# Patient Record
Sex: MALE | Race: WHITE | NOT HISPANIC OR LATINO | Employment: OTHER | URBAN - METROPOLITAN AREA
[De-identification: names, ages, dates, MRNs, and addresses within clinical notes are randomized per-mention and may not be internally consistent; named-entity substitution may affect disease eponyms.]

---

## 2017-01-02 ENCOUNTER — HOSPITAL ENCOUNTER (OUTPATIENT)
Dept: BEHAVIORAL HEALTH | Facility: CLINIC | Age: 62
End: 2017-01-02
Attending: SOCIAL WORKER
Payer: MEDICARE

## 2017-01-02 PROCEDURE — H2035 A/D TX PROGRAM, PER HOUR: HCPCS | Mod: HQ

## 2017-01-03 NOTE — PROGRESS NOTES
"Patient:  Jim Richard            Adult CD Progress Note and Treatment Plan Review     Attendance He attended a 2 hour session on Mon. 1/4 and Wed 1/6 for a total of 4 hours.         Total is 3 group sessions for 6 total hours.  Client also attended a 1 hour orientation session Tues 12/20 and a 1 hour tx planning session 12/21   Please refer to OP BEH CD Adult Attendance Record Documentation Flowsheet    Support group attended this week: yes    Reporting sobriety:  yes    Treatment Plan     Treatment Plan Review competed on: 1/4    Client preferred learning style: Visual  Demonstration    Staff Members contributing Earline Rubio, MS, LADC, LPC, Laly Weaver and staff                       Received Supervision: 1/4/17    Client: contributed to goals and plan.    Client received copy of plan/revised plan: Yes    Client agrees with plan/revised plan: Yes        Changes to Treatment Plan: yes    New Goals added since last review yes    Goals worked on since last review:  Use Episode Analysis, Discipline     Strategies effective: yes    Strategies need these changes: NA    ASAM Risk Rating:    Dimension 1 0. He reports his last use has changed to 12/31 and he used 12/22: Shows no signs of use or withdrawal    Dimension 2 2 Jim reports motivators are his has numerous health issues.  He is a smoker and not wanting to quit or get resources at this time. He begun working with a pain Dr. And has an appointment with his surgeon now.  Then the surgeon and pain drs. Will consult.  He takes gabapentin for neuropathy and his Drs. Think he should continue.hydroxyazine, oxycodone, Seroquel    Dimension 3 1 He reports anxiety at 5  of 10 and depression at 4 of 10 (where 1 is little and 10 =high).  At assessment scored 7 on MARIA FERNANDA-7 and 6 on PHQ9.  He reports a prior diagnosis of anxiety and major depression, recurrent, severe.  At this time he reports an increase, in anxiety and depression \"due to my using\" alchol 2x " "the last 2 weeks.  Jim reports he has no thoughts of self harm and he would never hurt himself or do that to his family.   He takes Trazodone, Effexor, and seroquel.  He has begun working with a new psychiatrist at [facility] and thinks this will be good for him, he will see him later this month     Dimension 4 3 He reports that he is \"extremely\" motivated for treatment and extremely sorry he used. \" I did not want to drive to group on Wed last week because I have Interlock\"    Dimension 5 4  He said he often gets 60 or 90 days of sobriety in and then uses.  His back pain is a trigger for him.  \"it hurts all the time, but I know using alcohol doesn't fix it\"  He states he will be talking with his surgeon and may have surgery.       Dimension 6 3 he reports he did talk to his sponsor about his using and he went to AA 3x this week.  He has pending legal issues and is using Intralock.    Any changes in Vulnerable Adult Status?  No  If yes, add to treatment plan and individual abuse prevention plan.    Family Involvement:   scheduled for next week, he intends to bring his sponsor    Data:   good insight client did participate  Client attended group and discussed his use over the last 2 weeks.  He completed \"Use Episode Analyisis and shared it with group. He feels his back pain was a big trigger, as was the disappointment of finding little relief in pain in 2 meetings with the pain clinic.  He said he did something different this time, though, he talked with sponsor and went to meetings shortly after getting sober from his drinking.  We discussed Discipline as a friend, and what he needs to consistently do to maintain long term sobriety.  Intervention:   Behavior modification  Counselor feedback  Education  Emotional management  Group feedback  Motivational Enhancement Therapy  Relapse prevention  Twelve Step facilitation   Discipline  I staffed client with clinical supervisor and staff.  Discussed his using and " "motivation for sobriety.  Discussed that he said he did something different after using this time, called his sponsor and went to AA.  He is aware that his pain needs to be dealt with and that it is a trigger, \"but it doesn't do any good to use\".  He will continue to work with pain clinic, surgeon and keep this counselor informed.  He will continue Interlock.   I talked with client about his use.  I and group gave feedback on his Use episode analysis, telling him he seemed to have a good picture of what lead to using and what he needs to add to his plan.  We discussed Discipline as coming to mean something new, which can help one become more serene and healthy, and how it fits with long term sobriety.  I told him it was good he was honest, but it would have been better had he told me on the phone when calling in sick.      Assessment:   Stages of Change Model  Contemplation    Appears/Sounds:  Cooperative  Motivated  Depressed   Seems to be happy to be sober and in a program and that he isn't getting \"kicked out\" of the program.    Plan:  Focus on recovery environment  Continue intralock  Staff client  Take medications as directed.   Work with PCP, Pain Dr. And psychiatrist on medication and pain management.  Talk with surgeon  Client will do \"balance\" exercise which will give insight into what he may want to add to his plan for ongoing sobriety.    Earline Rubio, [facility], MS, LPC        "

## 2017-01-03 NOTE — BRIEF OP NOTE
Acknowledgement of Current Treatment Plan       I have reviewed my treatment plan with my therapist / counselor on 1/4/17. I agree with the plan as it is written in the electronic health record.    Name Signature              Last Use Date:   Jim Richard    Name of Therapist / Counselor    Earline Rubio MS, LADC, LPC

## 2017-01-04 ENCOUNTER — HOSPITAL ENCOUNTER (OUTPATIENT)
Dept: BEHAVIORAL HEALTH | Facility: CLINIC | Age: 62
End: 2017-01-04
Attending: SOCIAL WORKER
Payer: MEDICARE

## 2017-01-04 PROCEDURE — H2035 A/D TX PROGRAM, PER HOUR: HCPCS | Mod: HQ

## 2017-01-09 ENCOUNTER — HOSPITAL ENCOUNTER (OUTPATIENT)
Dept: BEHAVIORAL HEALTH | Facility: CLINIC | Age: 62
End: 2017-01-09
Attending: SOCIAL WORKER
Payer: MEDICARE

## 2017-01-09 PROCEDURE — H2035 A/D TX PROGRAM, PER HOUR: HCPCS | Mod: HQ

## 2017-01-11 ENCOUNTER — HOSPITAL ENCOUNTER (OUTPATIENT)
Dept: BEHAVIORAL HEALTH | Facility: CLINIC | Age: 62
End: 2017-01-11
Attending: SOCIAL WORKER
Payer: MEDICARE

## 2017-01-11 PROCEDURE — H2035 A/D TX PROGRAM, PER HOUR: HCPCS | Mod: HQ

## 2017-01-11 NOTE — PROGRESS NOTES
Patient:  Jim Richard            Adult CD Progress Note and Treatment Plan Review     Attendance He attended a 2 hour family session on Mon. 1/9 and Wed 1/11 for a total of 4 hours.         Total is 5 group sessions for 10 total hours.  Client also attended a 1 hour orientation session Tues 12/20 and a 1 hour tx planning session 12/21   Please refer to OP BEH CD Adult Attendance Record Documentation Flowsheet    Support group attended this week: yes;WEEK of 1/9 family group- yes    Reporting sobriety:  Yes;WEEK of 1/9 family group-yes TONI reported 1/1/17    Treatment Plan     Treatment Plan Review competed on: 1/11    Client preferred learning style: Visual  Demonstration    Staff Members contributing Earline Rubio, MS, LADC, STEPHANIE, Laly Weaver and staff; Nelida MCKEON,PONCHO,SYLVIE                       Received Supervision: 1/4/17    Client: contributed to goals and plan.    Client received copy of plan/revised plan: Yes    Client agrees with plan/revised plan: Yes        Changes to Treatment Plan: yes    New Goals added since last review yes    Goals worked on since last review:  Use Episode Analysis, Discipline ;Attended family group 1/9 and 1/11/17    Strategies effective: yes    Strategies need these changes: NA    ASAM Risk Rating:    Dimension 1 0. He reports his last use has changed to 12/31 and he used 12/22: Shows no signs of use or withdrawal; WEEk of 1/9- TONI reported 1/1/17- no WD issues reported or observed.    Dimension 2 2 Jim reports motivators are his has numerous health issues.  He is a smoker and not wanting to quit or get resources at this time. He begun working with a pain DrJacek And has an appointment with his surgeon now.  Then the surgeon and pain drs. Will consult.  He takes gabapentin for neuropathy and his Drs. Think he should continue.hydroxyazine, oxycodone, Seroquel; WEEK of 1/9 family group-back pain reported, client reports Dr stated their is nothing they can do unless he  "stops smoking (client in contemplation)    Dimension 3 1 He reports anxiety at 5  of 10 and depression at 4 of 10 (where 1 is little and 10 =high).  At assessment scored 7 on MARIA FERNANDA-7 and 6 on PHQ9.  He reports a prior diagnosis of anxiety and major depression, recurrent, severe.  At this time he reports an increase, in anxiety and depression \"due to my using\" alchol 2x the last 2 weeks.  Jim reports he has no thoughts of self harm and he would never hurt himself or do that to his family.   He takes Trazodone, Effexor, and seroquel.  He has begun working with a new psychiatrist at [facility] and thinks this will be good for him, he will see him later this month WEEK of 1/9 family group -No emotional/behavioral concerns reported-(reports emotional/behavioral issues \"OK\") /no SI reported.    Dimension 4 3 He reports that he is \"extremely\" motivated for treatment and extremely sorry he used. \" I did not want to drive to group on Wed last week because I have Interlock\"WEEK of 1/9- reports motivation for recovery a 10 on 1-10 scale with 10 highest; reports cravings a 6 on 1-10 scale with 10 highest due to pain.    Dimension 5 4  He said he often gets 60 or 90 days of sobriety in and then uses.  His back pain is a trigger for him.  \"it hurts all the time, but I know using alcohol doesn't fix it\"  He states he will be talking with his surgeon and may have surgery.  WEEK of 1/9 family group-pain is a trigger (reports cravings a 6 on 1-10 scale with 10 highest due to pain). Stated he participated in sober activities this week and spent time with his son.     Dimension 6 3 he reports he did talk to his sponsor about his using and he went to AA 3x this week.  He has pending legal issues and is using Intralock.WEEK of 1/9 family group-reports high motivation for recovery; has sponsor who is supportive    Any changes in Vulnerable Adult Status?  No  If yes, add to treatment plan and individual abuse prevention plan.    Family " "Involvement:   scheduled for next week, he intends to bring his sponsor  WEEK of 1/9/17- attended both of the scheduled family sessions on 1/9 and 1/11/17  Data:   good insight client did participate  Client attended group and discussed his use over the last 2 weeks.  He completed \"Use Episode Analyisis and shared it with group. He feels his back pain was a big trigger, as was the disappointment of finding little relief in pain in 2 meetings with the pain clinic.  He said he did something different this time, though, he talked with sponsor and went to meetings shortly after getting sober from his drinking.  We discussed Discipline as a friend, and what he needs to consistently do to maintain long term sobriety.  WEEK of 1/9/17 family group-Client attended both of the scheduled family sessions- session one he brought his sponsor, session two he came alone. Client introduced himself to the family group along with his sponsor, and he gave a synopsis of why he is now in treatment. He stated that he has had many relapses and it has been difficult for him to stay sober. Client discussed how his ex wife told him she couldn't take any more of his etoh use- so he went to treatment and then had a couple of lapses and she served him with papers. He stated that this used to really bother him but he has begun to get over it a little more. His sponsor discussed how the client keeps coming and back and trying- not giving up. And the group and counselor gave positive feedback to the client for his determination to gain a stable sobriety. Client also briefly presented his day two worksheet on relapse, self-care and relationship issues. Client stated he needs to look at his sugar intake, and is contemplating quitting smoking.  Intervention:   Behavior modification  Counselor feedback  Education  Emotional management  Group feedback  Motivational Enhancement Therapy  Relapse prevention  Twelve Step facilitation   Discipline  I staffed " "client with clinical supervisor and staff.  Discussed his using and motivation for sobriety.  Discussed that he said he did something different after using this time, called his sponsor and went to AA.  He is aware that his pain needs to be dealt with and that it is a trigger, \"but it doesn't do any good to use\".  He will continue to work with pain clinic, surgeon and keep this counselor informed.  He will continue Interlock.   I talked with client about his use.  I and group gave feedback on his Use episode analysis, telling him he seemed to have a good picture of what lead to using and what he needs to add to his plan.  We discussed Discipline as coming to mean something new, which can help one become more serene and healthy, and how it fits with long term sobriety.  I told him it was good he was honest, but it would have been better had he told me on the phone when calling in sick.    WEEK of 1/9/17-family group-MET, education, counselor and group feedback.  Assessment:   Stages of Change Model  Contemplation    Appears/Sounds:  Cooperative  Motivated  Depressed   Seems to be happy to be sober and in a program and that he isn't getting \"kicked out\" of the program.  WEEK of 1/9/17- client appears sad/depressed (no SI reported);cooperative and engaged as evidenced by his participation/  Plan:  Focus on recovery environment  Continue intralock  Staff client  Take medications as directed.   Work with PCP, Pain Dr. And psychiatrist on medication and pain management.  Talk with surgeon  Client will do \"balance\" exercise which will give insight into what he may want to add to his plan for ongoing sobriety.  WEEK of 1/9/17family group- client to continue in treatment, use his support system, sponsor, consider stopping smoking and reducing his sugar intake/manage his medications/work with his physician regarding his pain if he stops smoking./possible grief issues  SYLVIE Broderick, MA,PONCHO        "

## 2017-01-16 ENCOUNTER — HOSPITAL ENCOUNTER (OUTPATIENT)
Dept: BEHAVIORAL HEALTH | Facility: CLINIC | Age: 62
End: 2017-01-16
Attending: SOCIAL WORKER
Payer: MEDICARE

## 2017-01-16 PROCEDURE — H2035 A/D TX PROGRAM, PER HOUR: HCPCS | Mod: HQ

## 2017-01-16 NOTE — PROGRESS NOTES
Patient:  Jim Richard            Adult CD Progress Note and Treatment Plan Review     Attendance He attended a 2 hour family session on Mon. 1/16  for a total of 2 hours.         Total is 19 group sessions for 38 total hours.  Client also attended a 1 hour orientation session Tues 12/20 and a 1 hour tx planning session 12/21   Please refer to OP BEH CD Adult Attendance Record Documentation Flowsheet    Support group attended this week: yes;WEEK of 1/9 family group- yes    Reporting sobriety:  Yes;    Treatment Plan     Treatment Plan Review competed on: 1/18    Client preferred learning style: Visual  Demonstration    Staff Members contributing Earline Rubio, MS, LADC, Nelida BROWN MA,PONCHO,LADC                       Received Supervision: 1/4/17    Client: contributed to goals and plan.    Client received copy of plan/revised plan: Yes    Client agrees with plan/revised plan: Yes        Changes to Treatment Plan: no    New Goals added since last review no    Goals worked on since last review: AA, chronic pain    Strategies effective: yes    Strategies need these changes: NA    ASAM Risk Rating:    Dimension 1 0. He reports his last use as 12/31. Shows no signs of use or withdrawal    Dimension 2 2 Jim moody reports Dr stated their is nothing they can do unless he stops smoking, which he isn't ready to do yet.  He begun working with a pain Dr. And has an appointment with his surgeon now.  Then the surgeon and pain drs. Will consult.  He takes gabapentin for neuropathy and his Drs. Think he should continue.hydroxyazine, oxycodone, Seroquel; , c  Dimension 3 1 He reports anxiety at 5  of 10 and depression at 5 of 10 (where 1 is little and 10 =high).    He reports a prior diagnosis of anxiety and major depression, recurrent, severe..  Jim reports he has no thoughts of self harm and he would never hurt himself or do that to his family.   He takes Trazodone (2-100mg before bed, Effexor (1-150 mg 1x  "daily), and seroquel (4-50 mg tablets daily).  He has begun working with a new psychiatrist at [facility] and thinks this will be good for him, he will see him later this week.    Dimension 4 2 . He reports that he is \"extremely\" motivated for treatment , 10 of 10  with 10 highest;     Dimension 5 3  He said he is going to continue to use prayer, sponsor and AA for support when he feels like using because of his pain.  We also discussed meditation and conscious breath to the area of pain.     Dimension 6 2 he reports he did talk to his brother and to his sponsor when he had cravings this week, due to his pain.  He has pending legal issues and is using Intralock.    Any changes in Vulnerable Adult Status?  No  If yes, add to treatment plan and individual abuse prevention plan.    Family Involvement:   none schedule this week  WEEK of 1/9/17- attended both of the scheduled family sessions on 1/9 and 1/11/17  Data:   good insight client did participate  Client attended group and discussed his pain issues and chronic pain assignment. He told us that the  Said he cannot do anything about Jim's pain unless he quits smoking, which Jim is in contemplation about. He did discuss he is going to continue to use prayer, sponsor and AA for support when he feels like using because of his pain.  We also discussed meditation and conscious breath to the area of pain.   Intervention:   Behavior modification  Counselor feedback  Education  Emotional management  Group feedback  Motivational Enhancement Therapy  Relapse prevention  Twelve Step facilitation   Discipline  This counselor checked in with client and we discussed why the surgeon may not be able to help him if he doesn't quit smoking.  We discussed his chronic pain.  Had an AA member come and speak to the group.  Had Jim share how he felt about the presentation. Handed out AA resources.  i told him he is doing a good job of attending AA and spending time with his " sponsor  Assessment:   Stages of Change Model  Contemplation    Appears/Sounds:  Cooperative  Motivated  Depressed   Seems to be depressed about his pain, yet hopeful there is something good to come.  He seems to be sure he wants sobriety in his life  Plan:  Focus on recovery environment  Continue intralock  Discuss possible grief issues.  Finish chronic pain assignment   SYLVIE Sarmiento, MA,LISW

## 2017-01-23 ENCOUNTER — HOSPITAL ENCOUNTER (OUTPATIENT)
Dept: BEHAVIORAL HEALTH | Facility: CLINIC | Age: 62
End: 2017-01-23
Attending: SOCIAL WORKER
Payer: MEDICARE

## 2017-01-23 PROCEDURE — H2035 A/D TX PROGRAM, PER HOUR: HCPCS | Mod: HQ

## 2017-01-23 NOTE — PROGRESS NOTES
Patient:  Jim Richard            Adult CD Progress Note and Treatment Plan Review     Attendance He attended a 2  session on Mon. 1/23  for a total of 4 hours.         Total is 9 group sessions for 18 total hours.  Client also attended a 1 hour orientation session Tues 12/20 and a 1 hour tx planning session 12/21   Please refer to OP BEH CD Adult Attendance Record Documentation Flowsheet    Support group attended this week: yes;    Reporting sobriety:  Yes;    Treatment Plan     Treatment Plan Review competed on: 1/25    Client preferred learning style: Visual  Demonstration    Staff Members contributing Earline Rubio, MS, LADC, LPC,                       Received Supervision: 1/4/17    Client: contributed to goals and plan.    Client received copy of plan/revised plan: Yes    Client agrees with plan/revised plan: Yes        Changes to Treatment Plan: yes    New Goals added since last review yes    Goals worked on since last review: chronic pain, relapse prevention, therapy    Strategies effective: yes    Strategies need these changes: NA    ASAM Risk Rating:    Dimension 1 0. He reports his last use as 1/25. He said he drank for a day before this and has had some shakes but they are done and he doesn't intend to use, because he needs a lodging program. Shows no signs of use or withdrawal    Dimension 2 2 Jim justinent reports Dr stated their is nothing they can do unless he stops smoking, which he isn't ready to do yet.  He begun working with a pain Dr. And has an appointment with his surgeon now.  Then the surgeon and pain drs. Will consult.  He takes gabapentin for neuropathy and his Drs. Think he should continue.hydroxyazine, oxycodone, Seroquel;    Dimension 3 1 He reports anxiety at 7  of 10 and depression at 7 of 10 and stress at 7 o f 10(where 1 is little and 10 =high).   He says his high scores have to do with pain and using episode, see below.  He reports a prior diagnosis of anxiety and major  "depression, recurrent, severe. Jim reports he \"I no thoughts of self harm and I would never hurt myself\", he also repeated that he would not do that to his family.   He takes Trazodone (2-100mg before bed, Effexor (1-150 mg 1x daily), and seroquel (4-50 mg tablets daily).  He has begun working with a new psychiatrist, see bleow    Dimension 4 2 . He reports that he is \"extremely\" motivated for treatment and sobriety, 10 of 10  with 10 highest;     Dimension 5 3  He said he is going to continue to use prayer, sponsor and AA for support when he feels like using because of his pain.  We also discussed meditation and conscious breath to the area of pain.  He will meet with his pain drs. 1/24    Dimension 6 2 he reports he did talk to his sponsor numberous times this last week and attended meetings  He has pending legal issues and is using [employer].    Any changes in Vulnerable Adult Status?  No  If yes, add to treatment plan and individual abuse prevention plan.    Family Involvement:   none schedule this week  WEEK of 1/9/17- attended both of the scheduled family sessions on 1/9 and 1/11/17  Data:   good insight client did participate  Client attended group and Monday and reported using chemicals and it was due to his surgeon saying he needed to quit smoking before he could do anything and Jim said his pain is becoming unbearable.  He completed his chronic pain assignment and said it was fitting, as it is much of his reason for using.  He said he feels\" remose, regret sad, shame and many other hard feelings\".  He said, despite all of this, he is hanging on to hope.  His psychiatrist said she cannot adjust his medications, nor do they work properly when he is using.  This counselor supported this and Jim said he knows this.  discussed his pain issues and chronic pain and his biggest trigger and he said he just needs to learn to get through the moments because \"using does nothing\".  He also reports he wants to get " "back to being a productive member of society.  He is starting an exercise program and will look into a therapist, as this counselor suggested.  I also suggested he talk to his psychiatrist about campral and naltrexone, as I will do when I talk to her. He did say he is starting a 6 week Big book discussion class.  Jim called 1/25 and said he would like to consider ideas for Lodging program and he wanted to go to [facility]. He then told me he had different insurance than what I was aware of. I told him if he needed detox, he could go to a [facility] facility for Detox  I told him about [facility].   Jim said he had a friend coming over, his sponsor and he would make some calls.  I also told him about the [facility].  He said he was having some depression, but \"not as in I will hurt myself\" He said he is upset he is using again, but ready to get further help.  Intervention:   Behavior modification  Counselor feedback  Education  Emotional management  Group feedback  Motivational Enhancement Therapy  Relapse prevention  Twelve Step facilitation   Discipline  This counselor checked in on Monday, with client and suggested that we are walking a balance of suggesting a higher level of care, and trying to help Jim learn to live soberly when he is not in a lodging program.  I told him I support continued OP programming if he gets a therapist, checks with his psychiatrist about campral or naltrexone for cravings, and reports what his pain clinic suggests he do, if surgery is not an option.  I also talked to his psychiatrist, Bre Pritchett, at [facility] and she is willing to prescribe Naltrexone.  As of Wednesday, Jim did none of these, but did call to discuss going to inpatient tx.  Assessment:   Stages of Change Model  Contemplation    Appears/Sounds:  Cooperative  Motivated  Depressed  Anxious   Seems to be depressed about his pain, yet hopeful there is something good to come.  He seems to be sure he wants sobriety in " his life and feels very disappointed in himself for using.  I don't know if Jim will be able to be sober, but I believe he needs to learn to live with the resources and realities, like chronic pain, that he has.  I do not see that  Lodging program is necessary at this time, however, I do believe it may be in the future, if these other resources do not add to his sober lifestyle.  He seems humbled and motivated by his use episode.  Plan:  Focus on recovery environment  Continue intralock  Look into a therapist, report who you will see.  Talk with Dr. senior about naltrexone or antabuse. Discuss possible grief issues with a therapist.  He is going to call Lodging programs and see what his options are.  This counselor called Merissa in the business office to tell her I just found out Jim has had [employer] since Jan 1, but didn't let me know.    Earline Rubio, SYLVIE, MS, LPC

## 2017-01-23 NOTE — BRIEF OP NOTE
Acknowledgement of Current Treatment Plan       I have reviewed my treatment plan with my therapist / counselor on 1/23. I agree with the plan as it is written in the electronic health record.    Name Signature             Last Use Date: 1/18/17   Jim Richard    Name of Therapist / Counselor    Earline Rubio MS, LADC, LPC

## 2017-02-07 ENCOUNTER — HOSPITAL ENCOUNTER (INPATIENT)
Facility: CLINIC | Age: 62
LOS: 2 days | Discharge: HOME | DRG: 885 | End: 2017-02-10
Attending: EMERGENCY MEDICINE | Admitting: PSYCHIATRY & NEUROLOGY
Payer: MEDICARE

## 2017-02-07 ENCOUNTER — TELEPHONE (OUTPATIENT)
Dept: BEHAVIORAL HEALTH | Facility: CLINIC | Age: 62
End: 2017-02-07

## 2017-02-07 DIAGNOSIS — F10.20 ACUTE ALCOHOLISM (H): ICD-10-CM

## 2017-02-07 DIAGNOSIS — F33.2 SEVERE RECURRENT MAJOR DEPRESSION WITHOUT PSYCHOTIC FEATURES (H): Primary | ICD-10-CM

## 2017-02-07 DIAGNOSIS — F10.220 ACUTE ALCOHOLIC INTOXICATION IN ALCOHOLISM, UNCOMPLICATED (H): ICD-10-CM

## 2017-02-07 DIAGNOSIS — R45.851 SUICIDAL IDEATION: ICD-10-CM

## 2017-02-07 LAB
AMPHETAMINES UR QL SCN: NORMAL
ANION GAP SERPL CALCULATED.3IONS-SCNC: 11 MMOL/L (ref 3–14)
APAP SERPL-MCNC: NORMAL MG/L (ref 10–20)
BARBITURATES UR QL: NORMAL
BASOPHILS # BLD AUTO: 0 10E9/L (ref 0–0.2)
BASOPHILS NFR BLD AUTO: 0.5 %
BENZODIAZ UR QL: NORMAL
BUN SERPL-MCNC: 22 MG/DL (ref 7–30)
CALCIUM SERPL-MCNC: 8 MG/DL (ref 8.5–10.1)
CANNABINOIDS UR QL SCN: NORMAL
CHLORIDE SERPL-SCNC: 101 MMOL/L (ref 94–109)
CO2 SERPL-SCNC: 23 MMOL/L (ref 20–32)
COCAINE UR QL: NORMAL
CREAT SERPL-MCNC: 1.28 MG/DL (ref 0.66–1.25)
DIFFERENTIAL METHOD BLD: ABNORMAL
EOSINOPHIL # BLD AUTO: 0 10E9/L (ref 0–0.7)
EOSINOPHIL NFR BLD AUTO: 0.7 %
ERYTHROCYTE [DISTWIDTH] IN BLOOD BY AUTOMATED COUNT: 15.5 % (ref 10–15)
ETHANOL SERPL-MCNC: 0.26 G/DL
GFR SERPL CREATININE-BSD FRML MDRD: 57 ML/MIN/1.7M2
GLUCOSE SERPL-MCNC: 120 MG/DL (ref 70–99)
HCT VFR BLD AUTO: 36.4 % (ref 40–53)
HGB BLD-MCNC: 12.2 G/DL (ref 13.3–17.7)
IMM GRANULOCYTES # BLD: 0 10E9/L (ref 0–0.4)
IMM GRANULOCYTES NFR BLD: 0.4 %
LYMPHOCYTES # BLD AUTO: 1.3 10E9/L (ref 0.8–5.3)
LYMPHOCYTES NFR BLD AUTO: 23.5 %
MCH RBC QN AUTO: 29.6 PG (ref 26.5–33)
MCHC RBC AUTO-ENTMCNC: 33.5 G/DL (ref 31.5–36.5)
MCV RBC AUTO: 88 FL (ref 78–100)
MONOCYTES # BLD AUTO: 0.4 10E9/L (ref 0–1.3)
MONOCYTES NFR BLD AUTO: 7.5 %
NEUTROPHILS # BLD AUTO: 3.8 10E9/L (ref 1.6–8.3)
NEUTROPHILS NFR BLD AUTO: 67.4 %
NRBC # BLD AUTO: 0 10*3/UL
NRBC BLD AUTO-RTO: 0 /100
OPIATES UR QL SCN: NORMAL
PCP UR QL SCN: NORMAL
PLATELET # BLD AUTO: 154 10E9/L (ref 150–450)
POTASSIUM SERPL-SCNC: 3.5 MMOL/L (ref 3.4–5.3)
RBC # BLD AUTO: 4.12 10E12/L (ref 4.4–5.9)
SALICYLATES SERPL-MCNC: 3 MG/DL
SODIUM SERPL-SCNC: 135 MMOL/L (ref 133–144)
WBC # BLD AUTO: 5.6 10E9/L (ref 4–11)

## 2017-02-07 PROCEDURE — 80320 DRUG SCREEN QUANTALCOHOLS: CPT | Performed by: EMERGENCY MEDICINE

## 2017-02-07 PROCEDURE — 80048 BASIC METABOLIC PNL TOTAL CA: CPT | Performed by: EMERGENCY MEDICINE

## 2017-02-07 PROCEDURE — 80307 DRUG TEST PRSMV CHEM ANLYZR: CPT | Performed by: EMERGENCY MEDICINE

## 2017-02-07 PROCEDURE — 80329 ANALGESICS NON-OPIOID 1 OR 2: CPT | Performed by: EMERGENCY MEDICINE

## 2017-02-07 PROCEDURE — 90791 PSYCH DIAGNOSTIC EVALUATION: CPT

## 2017-02-07 PROCEDURE — 85025 COMPLETE CBC W/AUTO DIFF WBC: CPT | Performed by: EMERGENCY MEDICINE

## 2017-02-07 PROCEDURE — 93005 ELECTROCARDIOGRAM TRACING: CPT

## 2017-02-07 PROCEDURE — 99285 EMERGENCY DEPT VISIT HI MDM: CPT | Mod: 25

## 2017-02-07 RX ORDER — ACAMPROSATE CALCIUM 333 MG/1
666 TABLET, DELAYED RELEASE ORAL 3 TIMES DAILY
COMMUNITY
End: 2017-12-20

## 2017-02-07 ASSESSMENT — ENCOUNTER SYMPTOMS
VOMITING: 0
NAUSEA: 0
COUGH: 1
NERVOUS/ANXIOUS: 1

## 2017-02-07 NOTE — ED AVS SNAPSHOT
Emergency Department    [address] [facility] [address]    Phone:  [phone]    Fax:  [phone]                                       Jim Richard   MRN: [id number]    Department:   Emergency Department   Date of Visit:  2/7/2017           After Visit Summary Signature Page     I have received my discharge instructions, and my questions have been answered. I have discussed any challenges I see with this plan with the nurse or doctor.    ..........................................................................................................................................  Patient/Patient Representative Signature      ..........................................................................................................................................  Patient Representative Print Name and Relationship to Patient    ..................................................               ................................................  Date                                            Time    ..........................................................................................................................................  Reviewed by Signature/Title    ...................................................              ..............................................  Date                                                            Time

## 2017-02-07 NOTE — IP AVS SNAPSHOT
MRN:[id number]                      After Visit Summary   2/7/2017    Jim Richard    MRN: [id number]           Thank you!     Thank you for choosing [facility] for your care. Our goal is always to provide you with excellent care.        Patient Information     Date Of Birth          1955        About your hospital stay     You were admitted on:  February 8, 2017 You last received care in the:  [facility]    You were discharged on:  February 10, 2017       Who to Call     For medical emergencies, please call 911.  For non-urgent questions about your medical care, please call your primary care provider or clinic, [phone]          Attending Provider     Provider    Kurt Villa MD Isely, MD Campos Wall, MD Rodriguez Gaitan, MD Giorgi Valles, Brody ANNE MD       Primary Care Provider Office Phone # Fax #    Vilma Romo [facility] [phone]       [address] [address]        Further instructions from your care team         Behavioral Discharge Planning and Instructions    Summary:  Admitted with intentional drug overdose with suicidal ideation    Main Diagnosis:  Major Depression, recurrent, severe, without psychotic features; Alcohol Dependence    Major Treatments, Procedures and Findings: Psychiatric assessment    Symptoms to Report: Losing more sleep, Mood getting worse or Thoughts of suicide    Lifestyle Adjustment: Follow all treatment recommendations, including completing chemical dependency treatment. Develop and follow safety plan. Maintain sober lifestyle (AA meetings and sponsor).     Psychiatry Follow-up:     You are being referred to [facility] for chemical dependency treatment. Please contact them next week to see when a bed will be available for you. Once you are in the treatment program, you will follow up weekly with Dr. Rand at  "[facility].     [facility]  [address] [place] [address]  [phone] / [phone] fax    Resources:   Crisis Intervention: 296.285.5336 or 779-663-5829 (TTY: 517.275.1152).  Call anytime for help.  National [place] on Mental Illness (www.mn.debra.org): 505.491.6608 or 399-851-7066.  Alcoholics Anonymous (www.alcoholics-anonymous.org): Check your phone book for your local chapter.  National Suicide Prevention Line (www.mentalFreight Connectionmn.org): 315-076-ULCP (7416)    General Medication Instructions:   See your medication sheet(s) for instructions.   Take all medicines as directed.  Make no changes unless your doctor suggests them.   Go to all your doctor visits.  Be sure to have all your required lab tests. This way, your medicines can be refilled on time.  Do not use any drugs not prescribed by your doctor.  Avoid alcohol.        Pending Results     No orders found from 2/6/2017 to 2/8/2017.            Statement of Approval     Ordered          02/10/17 1145  I have reviewed and agree with all the recommendations and orders detailed in this document.   EFFECTIVE NOW     Approved and electronically signed by:  Brody Rand MD             Admission Information        Provider Department Dept Phone    2/7/2017 Brody Rand MD [facility] [phone]      Your Vitals Were     Blood Pressure Pulse Temperature    159/87 mmHg 64 98.1  F (36.7  C) (Oral)    Respirations Height Weight    16 1.702 m (5' 7\") 98.022 kg (216 lb 1.6 oz)    BMI (Body Mass Index) Pulse Oximetry       33.84 kg/m2 95%       MyChart Information     [employer] lets you send messages to your doctor, view your test results, renew your prescriptions, schedule appointments and more. To sign up, go to [email or web address] . Click on \"Log in\" on the left side of the screen, which will take you to the Welcome page. Then click on \"Sign up Now\" on the right side of the page. "     You will be asked to enter the access code listed below, as well as some personal information. Please follow the directions to create your username and password.     Your access code is: [email or web address]  Expires: 2017  9:54 AM     Your access code will  in 90 days. If you need help or a new code, please call your [facility] clinic or [phone].        Care EveryWhere ID     This is your Care EveryWhere ID. This could be used by other organizations to access your [facility] medical records  [id number]           Review of your medicines      START taking        Dose / Directions    disulfiram 250 MG tablet   Commonly known as:  ANTABUSE   Used for:  Acute alcoholism (H)        Dose:  250 mg   Take 1 tablet (250 mg) by mouth daily   Quantity:  30 tablet   Refills:  0       DULoxetine 60 MG EC capsule   Commonly known as:  CYMBALTA   Used for:  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)        Dose:  60 mg   Start taking on:  2017   Take 1 capsule (60 mg) by mouth daily   Quantity:  30 capsule   Refills:  0       mirtazapine 15 MG tablet   Commonly known as:  REMERON   Used for:  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)        Dose:  15 mg   Take 1 tablet (15 mg) by mouth At Bedtime   Quantity:  30 tablet   Refills:  0         CONTINUE these medicines which have NOT CHANGED        Dose / Directions    acamprosate 333 MG EC tablet   Commonly known as:  CAMPRAL        Dose:  666 mg   Take 666 mg by mouth 3 times daily   Refills:  0       ATORVASTATIN CALCIUM PO        Dose:  10 mg   Take 10 mg by mouth At Bedtime   Refills:  0       GABAPENTIN PO        Dose:  600 mg   Take 600 mg by mouth 3 times daily   Refills:  0       HYDROXYZINE HCL PO        Dose:  50 mg   Take 50 mg by mouth 3 times daily as needed for other (anxiety)   Refills:  0       ibuprofen 400 MG tablet   Commonly known as:  ADVIL/MOTRIN   Used for:  Pain of left thigh        Dose:  400 mg    Take 1 tablet (400 mg) by mouth every 8 hours as needed for moderate pain   Quantity:  120 tablet   Refills:  1       MELOXICAM PO        Dose:  7.5 mg   Take 7.5 mg by mouth 2 times daily as needed   Refills:  0       multivitamin, therapeutic with minerals Tabs tablet        Dose:  1 tablet   Take 1 tablet by mouth daily   Refills:  0       OMEGA-3 FISH OIL PO        Dose:  1 g   Take 1 g by mouth daily   Refills:  0       QUETIAPINE FUMARATE PO        Dose:  50 mg   Take 50 mg by mouth 3 times daily as needed   Refills:  0       spironolactone-hydrochlorothiazide 25-25 MG per tablet   Commonly known as:  ALDACTAZIDE        Dose:  1 tablet   Take 1 tablet by mouth daily   Refills:  0       TRAZODONE HCL PO        Dose:  200 mg   Take 200 mg by mouth At Bedtime   Refills:  0       venlafaxine 150 MG 24 hr capsule   Commonly known as:  EFFEXOR-XR        Dose:  150 mg   Take 150 mg by mouth daily   Refills:  0            Where to get your medicines      These medications were sent to [facility] - [address]  [address] [facility] [address], [place] [address]     Phone:  [phone]    - disulfiram 250 MG tablet  - DULoxetine 60 MG EC capsule  - mirtazapine 15 MG tablet             Protect others around you: Learn how to safely use, store and throw away your medicines at www.disposemymeds.org.             Medication List: This is a list of all your medications and when to take them. Check marks below indicate your daily home schedule. Keep this list as a reference.      Medications           Morning Afternoon Evening Bedtime As Needed    acamprosate 333 MG EC tablet   Commonly known as:  CAMPRAL   Take 666 mg by mouth 3 times daily   Last time this was given:  666 mg on 2/10/2017  8:03 AM                                         ATORVASTATIN CALCIUM PO   Take 10 mg by mouth At Bedtime   Last time this was given:  10 mg on 2/9/2017 10:02 PM                                   disulfiram 250  MG tablet   Commonly known as:  ANTABUSE   Take 1 tablet (250 mg) by mouth daily   Last time this was given:  250 mg on 2/10/2017 11:27 AM                                   DULoxetine 60 MG EC capsule   Commonly known as:  CYMBALTA   Take 1 capsule (60 mg) by mouth daily   Start taking on:  2/11/2017   Last time this was given:  30 mg on 2/10/2017  8:03 AM                                   GABAPENTIN PO   Take 600 mg by mouth 3 times daily   Last time this was given:  600 mg on 2/10/2017  8:03 AM                                         HYDROXYZINE HCL PO   Take 50 mg by mouth 3 times daily as needed for other (anxiety)   Last time this was given:  50 mg on 2/9/2017  2:14 AM                                   ibuprofen 400 MG tablet   Commonly known as:  ADVIL/MOTRIN   Take 1 tablet (400 mg) by mouth every 8 hours as needed for moderate pain                                   MELOXICAM PO   Take 7.5 mg by mouth 2 times daily as needed                                mirtazapine 15 MG tablet   Commonly known as:  REMERON   Take 1 tablet (15 mg) by mouth At Bedtime   Last time this was given:  7.5 mg on 2/9/2017 10:02 PM                                   multivitamin, therapeutic with minerals Tabs tablet   Take 1 tablet by mouth daily   Last time this was given:  1 tablet on 2/10/2017  8:03 AM                                   OMEGA-3 FISH OIL PO   Take 1 g by mouth daily   Last time this was given:  1 g on 2/10/2017  8:03 AM                                   QUETIAPINE FUMARATE PO   Take 50 mg by mouth 3 times daily as needed   Last time this was given:  50 mg on 2/9/2017  2:44 PM                                   spironolactone-hydrochlorothiazide 25-25 MG per tablet   Commonly known as:  ALDACTAZIDE   Take 1 tablet by mouth daily                                   TRAZODONE HCL PO   Take 200 mg by mouth At Bedtime   Last time this was given:  200 mg on 2/9/2017 10:02 PM                                   venlafaxine 150  MG 24 hr capsule   Commonly known as:  EFFEXOR-XR   Take 150 mg by mouth daily   Last time this was given:  150 mg on 2/10/2017  8:03 AM                                             More Information        Depression: Tips to Help Yourself  As your health care providers help treat your depression, you can also help yourself. Keep in mind that your illness affects you emotionally, physically, mentally, and socially. So full recovery will take time. Take care of your body and your soul, and be patient with yourself as you get better.    Be with others  Don t isolate yourself--you ll only feel worse. Try to be with other people. And take part in fun activities when you can. Go to a movie, [employer], Yazdanism service, or social event. Talk openly with people you can trust. And accept help when it s offered.  Keep your perspective    Depression can cloud your judgment. So wait until you feel better before making major life decisions, such as changing jobs, moving, or getting  or .    This illness is not your fault. Don t blame yourself for your depression.    Recovering from depression is a process. Don t be discouraged if it takes some time to feel better.    Depression saps your energy and concentration. So you won t be able to do all the things you used to do. Set small goals and do what you can.  Take care of your body  People with depression often lose the desire to take care of themselves. That only makes their problems worse. During treatment and afterward, make a point to:    Exercise. It s a great way to take care of your body. And studies have shown that exercise helps fight depression.    Avoid drugs and alcohol. These may ease the pain in the short term. But they ll only make your problems worse in the long run.    Get relief from stress. Ask your healthcare provider for relaxation exercises and techniques to help relieve stress.    Eat right. A balanced and healthy diet helps keep your body  healthy.    3728-6975 [employer]. [address] [place] [address], [place] [address]. All rights reserved. This information is not intended as a substitute for professional medical care. Always follow your healthcare professional's instructions.

## 2017-02-07 NOTE — IP AVS SNAPSHOT
[place] [address] [place] [address]    Phone:  [phone]                                       After Visit Summary   2/7/2017    Jim Richard    MRN: [id number]           After Visit Summary Signature Page     I have received my discharge instructions, and my questions have been answered. I have discussed any challenges I see with this plan with the nurse or doctor.    ..........................................................................................................................................  Patient/Patient Representative Signature      ..........................................................................................................................................  Patient Representative Print Name and Relationship to Patient    ..................................................               ................................................  Date                                            Time    ..........................................................................................................................................  Reviewed by Signature/Title    ...................................................              ..............................................  Date                                                            Time

## 2017-02-08 PROBLEM — F32.A DEPRESSION: Status: ACTIVE | Noted: 2017-02-08

## 2017-02-08 LAB
ALBUMIN SERPL-MCNC: 3.6 G/DL (ref 3.4–5)
ALCOHOL BREATH TEST: 0.02 (ref 0–0.01)
ALP SERPL-CCNC: 152 U/L (ref 40–150)
ALT SERPL W P-5'-P-CCNC: 30 U/L (ref 0–70)
ANION GAP SERPL CALCULATED.3IONS-SCNC: 11 MMOL/L (ref 3–14)
AST SERPL W P-5'-P-CCNC: 31 U/L (ref 0–45)
BILIRUB SERPL-MCNC: 0.5 MG/DL (ref 0.2–1.3)
BUN SERPL-MCNC: 24 MG/DL (ref 7–30)
CALCIUM SERPL-MCNC: 8.8 MG/DL (ref 8.5–10.1)
CHLORIDE SERPL-SCNC: 98 MMOL/L (ref 94–109)
CO2 SERPL-SCNC: 24 MMOL/L (ref 20–32)
CREAT SERPL-MCNC: 1.08 MG/DL (ref 0.66–1.25)
GFR SERPL CREATININE-BSD FRML MDRD: 69 ML/MIN/1.7M2
GLUCOSE SERPL-MCNC: 86 MG/DL (ref 70–99)
INTERPRETATION ECG - MUSE: NORMAL
POTASSIUM SERPL-SCNC: 4.1 MMOL/L (ref 3.4–5.3)
PROT SERPL-MCNC: 7.4 G/DL (ref 6.8–8.8)
SODIUM SERPL-SCNC: 133 MMOL/L (ref 133–144)
TSH SERPL DL<=0.005 MIU/L-ACNC: 1.51 MU/L (ref 0.4–4)

## 2017-02-08 PROCEDURE — 25000132 ZZH RX MED GY IP 250 OP 250 PS 637: Mod: GY | Performed by: PHYSICIAN ASSISTANT

## 2017-02-08 PROCEDURE — 12400006 ZZH R&B MH INTERMEDIATE

## 2017-02-08 PROCEDURE — 25000132 ZZH RX MED GY IP 250 OP 250 PS 637: Mod: GY | Performed by: PSYCHIATRY & NEUROLOGY

## 2017-02-08 PROCEDURE — A9270 NON-COVERED ITEM OR SERVICE: HCPCS | Mod: GY | Performed by: PSYCHIATRY & NEUROLOGY

## 2017-02-08 PROCEDURE — 40000275 ZZH STATISTIC RCP TIME EA 10 MIN

## 2017-02-08 PROCEDURE — 36415 COLL VENOUS BLD VENIPUNCTURE: CPT | Performed by: PSYCHIATRY & NEUROLOGY

## 2017-02-08 PROCEDURE — 84443 ASSAY THYROID STIM HORMONE: CPT | Performed by: PSYCHIATRY & NEUROLOGY

## 2017-02-08 PROCEDURE — 80053 COMPREHEN METABOLIC PANEL: CPT | Performed by: PSYCHIATRY & NEUROLOGY

## 2017-02-08 PROCEDURE — A9270 NON-COVERED ITEM OR SERVICE: HCPCS | Mod: GY

## 2017-02-08 PROCEDURE — 25000132 ZZH RX MED GY IP 250 OP 250 PS 637: Mod: GY

## 2017-02-08 PROCEDURE — A9270 NON-COVERED ITEM OR SERVICE: HCPCS | Mod: GY | Performed by: PHYSICIAN ASSISTANT

## 2017-02-08 RX ORDER — FOLIC ACID 1 MG/1
1 TABLET ORAL ONCE
Status: COMPLETED | OUTPATIENT
Start: 2017-02-08 | End: 2017-02-08

## 2017-02-08 RX ORDER — HYDROCHLOROTHIAZIDE 25 MG/1
25 TABLET ORAL DAILY
Status: DISCONTINUED | OUTPATIENT
Start: 2017-02-08 | End: 2017-02-10 | Stop reason: HOSPADM

## 2017-02-08 RX ORDER — CHLORAL HYDRATE 500 MG
1 CAPSULE ORAL DAILY
Status: DISCONTINUED | OUTPATIENT
Start: 2017-02-08 | End: 2017-02-10 | Stop reason: HOSPADM

## 2017-02-08 RX ORDER — CELECOXIB 100 MG/1
100 CAPSULE ORAL 2 TIMES DAILY PRN
Status: DISCONTINUED | OUTPATIENT
Start: 2017-02-08 | End: 2017-02-10 | Stop reason: HOSPADM

## 2017-02-08 RX ORDER — VENLAFAXINE HYDROCHLORIDE 150 MG/1
150 CAPSULE, EXTENDED RELEASE ORAL DAILY
Status: DISCONTINUED | OUTPATIENT
Start: 2017-02-08 | End: 2017-02-10 | Stop reason: HOSPADM

## 2017-02-08 RX ORDER — ACAMPROSATE CALCIUM 333 MG/1
666 TABLET, DELAYED RELEASE ORAL 3 TIMES DAILY
Status: DISCONTINUED | OUTPATIENT
Start: 2017-02-08 | End: 2017-02-10 | Stop reason: HOSPADM

## 2017-02-08 RX ORDER — QUETIAPINE FUMARATE 50 MG/1
50 TABLET, FILM COATED ORAL 3 TIMES DAILY PRN
Status: DISCONTINUED | OUTPATIENT
Start: 2017-02-08 | End: 2017-02-10 | Stop reason: HOSPADM

## 2017-02-08 RX ORDER — GABAPENTIN 600 MG/1
600 TABLET ORAL 3 TIMES DAILY
Status: DISCONTINUED | OUTPATIENT
Start: 2017-02-08 | End: 2017-02-10 | Stop reason: HOSPADM

## 2017-02-08 RX ORDER — HYDROXYZINE HYDROCHLORIDE 25 MG/1
25-50 TABLET, FILM COATED ORAL EVERY 4 HOURS PRN
Status: DISCONTINUED | OUTPATIENT
Start: 2017-02-08 | End: 2017-02-10 | Stop reason: HOSPADM

## 2017-02-08 RX ORDER — ACETAMINOPHEN 325 MG/1
975 TABLET ORAL EVERY 8 HOURS PRN
Status: DISCONTINUED | OUTPATIENT
Start: 2017-02-08 | End: 2017-02-10 | Stop reason: HOSPADM

## 2017-02-08 RX ORDER — MULTIVITAMIN,THERAPEUTIC
1 TABLET ORAL ONCE
Status: COMPLETED | OUTPATIENT
Start: 2017-02-08 | End: 2017-02-08

## 2017-02-08 RX ORDER — MULTIPLE VITAMINS W/ MINERALS TAB 9MG-400MCG
1 TAB ORAL DAILY
Status: DISCONTINUED | OUTPATIENT
Start: 2017-02-08 | End: 2017-02-08

## 2017-02-08 RX ORDER — LANOLIN ALCOHOL/MO/W.PET/CERES
100 CREAM (GRAM) TOPICAL ONCE
Status: COMPLETED | OUTPATIENT
Start: 2017-02-08 | End: 2017-02-08

## 2017-02-08 RX ORDER — ATORVASTATIN CALCIUM 10 MG/1
10 TABLET, FILM COATED ORAL AT BEDTIME
Status: DISCONTINUED | OUTPATIENT
Start: 2017-02-08 | End: 2017-02-10 | Stop reason: HOSPADM

## 2017-02-08 RX ORDER — TRAZODONE HYDROCHLORIDE 100 MG/1
200 TABLET ORAL AT BEDTIME
Status: DISCONTINUED | OUTPATIENT
Start: 2017-02-08 | End: 2017-02-10 | Stop reason: HOSPADM

## 2017-02-08 RX ORDER — SPIRONOLACTONE 25 MG/1
25 TABLET ORAL DAILY
Status: DISCONTINUED | OUTPATIENT
Start: 2017-02-08 | End: 2017-02-10 | Stop reason: HOSPADM

## 2017-02-08 RX ORDER — SPIRONOLACTONE AND HYDROCHLOROTHIAZIDE 25; 25 MG/1; MG/1
1 TABLET ORAL DAILY
Status: DISCONTINUED | OUTPATIENT
Start: 2017-02-08 | End: 2017-02-08

## 2017-02-08 RX ORDER — IBUPROFEN 400 MG/1
400 TABLET, FILM COATED ORAL EVERY 8 HOURS PRN
Status: DISCONTINUED | OUTPATIENT
Start: 2017-02-08 | End: 2017-02-08

## 2017-02-08 RX ADMIN — GABAPENTIN 600 MG: 600 TABLET, FILM COATED ORAL at 21:20

## 2017-02-08 RX ADMIN — ACAMPROSATE CALCIUM 666 MG: 333 TABLET, DELAYED RELEASE ORAL at 22:40

## 2017-02-08 RX ADMIN — FOLIC ACID 1 MG: 1 TABLET ORAL at 22:41

## 2017-02-08 RX ADMIN — Medication 1 G: at 21:16

## 2017-02-08 RX ADMIN — NICOTINE POLACRILEX 8 MG: 4 LOZENGE ORAL at 22:32

## 2017-02-08 RX ADMIN — ATORVASTATIN CALCIUM 10 MG: 10 TABLET, FILM COATED ORAL at 22:43

## 2017-02-08 RX ADMIN — MULTIPLE VITAMINS W/ MINERALS TAB 1 TABLET: TAB at 21:17

## 2017-02-08 RX ADMIN — VENLAFAXINE HYDROCHLORIDE 150 MG: 150 CAPSULE, EXTENDED RELEASE ORAL at 21:17

## 2017-02-08 RX ADMIN — HYDROCHLOROTHIAZIDE 25 MG: 25 TABLET ORAL at 21:18

## 2017-02-08 RX ADMIN — CELECOXIB 100 MG: 100 CAPSULE ORAL at 21:29

## 2017-02-08 RX ADMIN — SPIRONOLACTONE 25 MG: 25 TABLET ORAL at 21:18

## 2017-02-08 RX ADMIN — TRAZODONE HYDROCHLORIDE 200 MG: 100 TABLET ORAL at 22:40

## 2017-02-08 RX ADMIN — Medication 100 MG: at 22:40

## 2017-02-08 NOTE — ED NOTES
Set up with hot lunch in [facility] area. Remains on  hold with security watch

## 2017-02-08 NOTE — ED PROVIDER NOTES
"  History     Chief Complaint:  Drug Overdose and Suicidal    HPI  Jim Richard is a 61 year old male who presents with drug overdose, alcohol intoxication, and suicidal ideation. The patient has a history of chemical dependency and alcohol abuse, as well as CAD with a previous MI. He has multiple home prescriptions for medications at home and today the patient drank approximately 1 quart of alcohol. He then tonight, around 1800, took \"a whole bunch\" of his medications. He does not recall the exact amounts but states that he took \"maybe half a bottle\" of each medication. He denies any accompanying illicit drug use with this attempt. EMS was called and he was subsequently brought here for evaluation. The patient denies any current symptoms apart from an occasional cough.    Allergies:  No known drug allergies      Medications:    Campral  Meloxicam  Atorvastatin  Gabapentin  Trazodone  Hydroxyzine  Prednisone  Albuterol inhaler  Tylenol  Effexor  Quetiapine  Clonazepam       Past Medical History:    Chemical dependency  Pain management  Spinal stenosis  Anxiety and depression  PAD  Hypertension  Hyperlipidemia  Depression  PVD  Alcoholism  Hepatomegaly  CAD  MI    Past Surgical History:    Appendectomy  T&A  Femoropopliteal bypass graft  Endarterectomy femoral  Laminectomy, fusion lumbar 3 level      Family History:    Mental illness, substance abuse      Social History:  Smoking status: Current smoker  Alcohol use: Yes    Marital Status:  Single      Review of Systems   Respiratory: Positive for cough.    Gastrointestinal: Negative for nausea and vomiting.   Psychiatric/Behavioral: Positive for suicidal ideas and self-injury. The patient is nervous/anxious.    All other systems reviewed and are negative.      Physical Exam   First Vitals:  BP: 141/67 mmHg  Pulse: 71  Heart Rate: 70  Temp: 99  F (37.2  C)  Resp: 16  Height: 170.2 cm (5' 7\")  Weight: 99.791 kg (220 lb)  SpO2: 95 %      Physical Exam  Nursing " note and vitals reviewed.  Constitutional:   Awake alert  HENT:   Pharynx normal, tms normal, atraumatic  Mouth/Throat:  Oropharynx is clear and moist.   Eyes:    Conjunctivae normal and EOM are normal. Pupils are equal, round, and reactive to light.   Neck:    Normal range of motion. Neck supple. No tracheal deviation present.   Cardiovascular: Normal rate, regular rhythm, normal heart sounds and intact distal pulses.    Pulmonary/Chest:  Effort normal and breath sounds normal. No respiratory distress. No wheezes. No rales. No tenderness.   Abdominal:   Soft. The patient exhibits no mass. There is no tenderness. There is no rebound and no guarding.   Musculoskeletal:  Normal range of motion. No edema and no tenderness.   Lymphadenopathy:  No cervical adenopathy.   Neurological:  Alert and oriented to person, place, and time. No cranial nerve deficit. Normal muscle tone.   Skin:    Skin is warm and dry.   Psychiatric:   Normal mood and affect.      Emergency Department Course     ECG (20:45:34):  Rate 69 bpm. TN interval 164. QRS duration 146. QT/QTc 430/460. P-R-T axes 51 2 13. Normal sinus rhythm. Right bundle branch block. Abnormal ECG. Interpreted by Kurt Villa MD.     Laboratory:  CBC: HGB 12.2 (L), o/w WNL (WBC 5.6, )    BMP: Glucose 120 (H), Creatinine 1.28 (H), GFR 57 (L), Calcium 8.0 (L), o/w negative   Salicylate Level: 3  Acetaminophen Level: <2  2100 - Alcohol Level: 0.26 (H)   Drug screen: Negative    Emergency Department Course:  The patient arrived in the emergency department via EMS.   Past medical records, nursing notes, and vitals reviewed.  2351: I performed an exam of the patient and obtained history, as documented above.   IV inserted and blood drawn.     The case was discussed with DEC who then evaluated the patient. They discussed the case with psychiatry here, who is refusing to accept the patient until sober. The patient will be held here until clinically sober.       Impression & Plan      Medical Decision Makin-year-old male with obvious suicide attempt tonight trying to kill himself by ingesting handfuls of his medications.eKG laboratory studies salicylate and acetaminophen are normal.  Urine tox screen is negative.  Alcohol level elevated at 0.26. Patient was seen by DEC and thought appropriate for mental health evaluation and hospitalization.  Patient refused admission by on-call psychiatrist-without he could go to detox.  I am concerned for serious suicide attempt with this patient with his medication overdose. He appears medically stable and clear and has been hemodynamically stable here.  I am uncomfortable sending him to detox.  Health officer hold was placed and he will be reevaluated by DEC in the morning after he lizbeth up.care transferred to Dr. Dumas at change of shift    Diagnosis:    ICD-10-CM    1. Suicidal ideation R45.851    2. Acute alcoholic intoxication in alcoholism, uncomplicated (H) F10.220          Piter Turcios  2017    EMERGENCY DEPARTMENT    I, Piter Turcios, am serving as a scribe at 11:51 PM on 2017 to document services personally performed by Kurt Villa MD based on my observations and the provider's statements to me.      Kurt Villa MD  17 0027    Kurt Villa MD  17 0027

## 2017-02-08 NOTE — ED NOTES
Bed: ED18  Expected date:   Expected time:   Means of arrival:   Comments:  Vilma Gill on hold eta 1239

## 2017-02-08 NOTE — PROGRESS NOTES
.Nursing assessment complete including patient and medication profiles. Risk assessments completed addressing suicide,fall,skin,nutrition and safety issues. Care plan initiated. Assessments reviewed with physician and admit orders received. .Welcome packet reviewed with patient. Information reviewed includes getting emergency help, preventing infections, understanding your care, using medication safely, reducing falls, preventing pressure ulcers, smoking cessation, powerful choices and Patients Bill of Rights. Pt. given tour of the unit and instruction on use of facility including emergency call light. Program schedule reviewed with patient. Questions regarding the unit addressed. Pt. Search completed and belongings inventoried.

## 2017-02-08 NOTE — ED NOTES
Patient received in checkout with the plan for re-evaluation when sober pending.  After becoming sober, patient had initially told DEC that he had not taken any extra medications last night and did not have any thoughts of overdose.  I discussed this with him further and he did admit to taking a couple extra pills at first.  Upon pushing him how many, he reported it may have been half of a bottle.  He is not sure which medication he took.  Given the intentional overdose and impulsive behavior, patient will be admitted to the psychiatrist's service for continued monitoring and treatment.      Sebastien Gasca MD  02/08/17 0065

## 2017-02-08 NOTE — PHARMACY-ADMISSION MEDICATION HISTORY
Admission medication history interview status for the 2/7/2017  admission is complete. See EPIC admission navigator for prior to admission medications     Medication history source reliability:Good    Actions taken by pharmacist (provider contacted, etc):Veriifed all Rx meds with patient's retail pharmacy records taken through [facility]     Additional medication history information not noted on PTA med list :None    Medication reconciliation/reorder completed by provider prior to medication history? No    Time spent in this activity: 25 minutes    Prior to Admission medications    Medication Sig Last Dose Taking? Auth Provider   acamprosate (CAMPRAL) 333 MG EC tablet Take 666 mg by mouth 3 times daily  2/7/2017 at Unknown time Yes Reported, Patient   MELOXICAM PO Take 7.5 mg by mouth 2 times daily as needed  Past Week at Unknown time Yes Reported, Patient   ATORVASTATIN CALCIUM PO Take 10 mg by mouth At Bedtime  2/7/2017 at Unknown time Yes Unknown, Entered By History   spironolactone-hydrochlorothiazide (ALDACTAZIDE) 25-25 MG per tablet Take 1 tablet by mouth daily 2/7/2017 at Unknown time Yes Unknown, Entered By History   QUETIAPINE FUMARATE PO Take 50 mg by mouth 3 times daily as needed prn med Yes Unknown, Entered By History   GABAPENTIN PO Take 600 mg by mouth 3 times daily  2/7/2017 at Unknown time Yes Reported, Patient   ibuprofen (ADVIL,MOTRIN) 400 MG tablet Take 1 tablet (400 mg) by mouth every 8 hours as needed for moderate pain Past Week at Unknown time Yes Eddy Davidson, PA-C   TRAZODONE HCL PO Take 200 mg by mouth At Bedtime  2/7/2017 at pm Yes Unknown, Entered By History   venlafaxine (EFFEXOR-XR) 150 MG 24 hr capsule Take 150 mg by mouth daily 2/7/2017 at Unknown time Yes Unknown, Entered By History   multivitamin, therapeutic with minerals (THERA-VIT-M) TABS Take 1 tablet by mouth daily Past Month at Unknown time Yes Unknown, Entered By History   Omega-3 Fatty Acids (OMEGA-3 FISH OIL PO)  Take 1 g by mouth daily Past Month at Unknown time Yes Unknown, Entered By History   HYDROXYZINE HCL PO Take 50 mg by mouth 3 times daily as needed for other (anxiety)  prn med Yes Reported, Patient

## 2017-02-08 NOTE — ED NOTES
Bed: [facility]  Expected date: 2/7/17  Expected time: 8:00 PM  Means of arrival: Ambulance  Comments:  [phone] suicidal; hold

## 2017-02-08 NOTE — ED NOTES
Pt states he is feeling suicidal again. States again he needs something to help him relax. Informed him it is not safe to give anything due to unknown ingestion, will wait to see what lab work shows.

## 2017-02-08 NOTE — TELEPHONE ENCOUNTER
S: Kasey called saying pt came to [facility] er last laurel due to an reported od as a SA.  B: breath was .26. He said he recently relapsed, 1 pint per day. Er dr, per Kasey, said pt does not need detox. Utox neg. Er dr is reportedly concerned that pt is impulsive. Pt has hypertension.  A: reassessed this am and denies SI and denies he attempted suicide; denies he od'ed to  but then said he od'ed on his hypertension meds in a SA, unk amt. Coop, med cleared  R: 72 hr hold will be placed, per kasey; #77/emma accepted for himself                   milvia

## 2017-02-08 NOTE — PROGRESS NOTES
Jim is pleasant gentleman who has been having a though time.  He got a DUI, and part of his probation is not to drink alcohol.  He told his  he had two drinks of vodka and Officer told him to pack his things and he was going to retirement.  Jim could not take the news, and he drank 1 liter of vodka and took several medications in order to kill himself.  He said his wife divorce him because his drinking . His medical history includes Coronary Artery Disease with a previous MI. His support group are his roommate and sponsor Blake.  He also has problem with his back, had a Spinal stenosis in the past and the back pain is back again.  At the time of the interview he is complaining of back spasm.  Admission completed,  Patient denies any suicidal ideation at this time.  Contracts for safety.

## 2017-02-08 NOTE — TELEPHONE ENCOUNTER
S: Pt is a 61 yr old male in  ED for intentional OD report by Alexsandra    B: pt reports he took double his mh meds as an intentional OD and washed them down w/ vodka.  Breathalyzer .26.  Pt reports he is currently on probation and that when he relapsed he violated his probation.  Pt reports he would rather die then go to alf.  Pt has a psychiatrist.  Per nursing note pt is requesting meds for anxiety in ED.     A: vol     R: discussed pt w/ Dr. Frank who is recommending detox for pt no mh admission

## 2017-02-08 NOTE — PROVIDER NOTIFICATION
02/08/17 1442   Patient Belongings   Patient Belongings other (see comments)   Disposition of Belongings locker   Belongings Search Yes   Clothing Search Yes   Second Staff Kevin     Jacket black and green  PJ pants  Socks  Blue sweatshirt  Brown slip on shoes with ties  iphone  Phone   Cigarettes (6)  Wallet  [place] drivers license  Medicare insurance card  business cards  AARP card  aetna card    Security envelope # [id number]  Visa [id number]  Visa [id number]        Admit ________________________________   Date/time ______________          DC ______________________________________  Date/time____________________

## 2017-02-08 NOTE — ED NOTES
"Pt brought in by EMS after ingesting unknown amount of his prescription meds and quart of vodka. Pt states he was trying to kill himself. Pt's AA sponsor notified 911. Pt was just told today his  that he will be going to [facility] for violating his probation. Pt had been trying to stay sober but relapsed 1/11/17 after finding out his back surgery didn't work and his doctor blamed it on him smoking. Pt is afraid he will no longer qualify for disability now and he'll have no money. Pt also states he is not going to [facility] and to please \"just let me go.\" States \"I don't wanna go to [facility].\" Pt very tearful. Pt states he is tired. Pt states now that he is here, he will not do anything to hurt himself.  "

## 2017-02-08 NOTE — PROGRESS NOTES
Patient brought in on a  Hold by [facility] b[facility] # [id number] file # [id number] of the [facility] Department [phone]. This department will need to be called when a discharge order is placed and before the discharge and then fully documented in Epic.

## 2017-02-08 NOTE — ED PROVIDER NOTES
Patient signed out to me by Dr. Gasca awaiting psych bed.    Transferred to inpatient psych.      Chastity Frias MD  02/09/17 0054

## 2017-02-09 PROCEDURE — 25000132 ZZH RX MED GY IP 250 OP 250 PS 637: Mod: GY | Performed by: PSYCHIATRY & NEUROLOGY

## 2017-02-09 PROCEDURE — A9270 NON-COVERED ITEM OR SERVICE: HCPCS | Mod: GY

## 2017-02-09 PROCEDURE — 97150 GROUP THERAPEUTIC PROCEDURES: CPT | Mod: GO

## 2017-02-09 PROCEDURE — 25000132 ZZH RX MED GY IP 250 OP 250 PS 637: Mod: GY

## 2017-02-09 PROCEDURE — A9270 NON-COVERED ITEM OR SERVICE: HCPCS | Mod: GY | Performed by: PHYSICIAN ASSISTANT

## 2017-02-09 PROCEDURE — A9270 NON-COVERED ITEM OR SERVICE: HCPCS | Mod: GY | Performed by: PSYCHIATRY & NEUROLOGY

## 2017-02-09 PROCEDURE — 12400006 ZZH R&B MH INTERMEDIATE

## 2017-02-09 PROCEDURE — 40000275 ZZH STATISTIC RCP TIME EA 10 MIN

## 2017-02-09 PROCEDURE — 94660 CPAP INITIATION&MGMT: CPT

## 2017-02-09 PROCEDURE — 90853 GROUP PSYCHOTHERAPY: CPT

## 2017-02-09 PROCEDURE — 25000132 ZZH RX MED GY IP 250 OP 250 PS 637: Mod: GY | Performed by: PHYSICIAN ASSISTANT

## 2017-02-09 PROCEDURE — 27210339 ZZH CIRCUIT HUMIDITY W/CPAP BIP

## 2017-02-09 RX ORDER — LANOLIN ALCOHOL/MO/W.PET/CERES
100 CREAM (GRAM) TOPICAL DAILY
Status: DISCONTINUED | OUTPATIENT
Start: 2017-02-09 | End: 2017-02-10 | Stop reason: HOSPADM

## 2017-02-09 RX ORDER — LORAZEPAM 2 MG/ML
1-4 INJECTION INTRAMUSCULAR EVERY 30 MIN PRN
Status: DISCONTINUED | OUTPATIENT
Start: 2017-02-09 | End: 2017-02-10 | Stop reason: HOSPADM

## 2017-02-09 RX ORDER — FOLIC ACID 1 MG/1
1 TABLET ORAL DAILY
Status: DISCONTINUED | OUTPATIENT
Start: 2017-02-09 | End: 2017-02-10 | Stop reason: HOSPADM

## 2017-02-09 RX ORDER — MULTIPLE VITAMINS W/ MINERALS TAB 9MG-400MCG
1 TAB ORAL DAILY
Status: DISCONTINUED | OUTPATIENT
Start: 2017-02-09 | End: 2017-02-10 | Stop reason: HOSPADM

## 2017-02-09 RX ORDER — DULOXETIN HYDROCHLORIDE 30 MG/1
30 CAPSULE, DELAYED RELEASE ORAL DAILY
Status: DISCONTINUED | OUTPATIENT
Start: 2017-02-09 | End: 2017-02-10

## 2017-02-09 RX ORDER — MIRTAZAPINE 7.5 MG/1
7.5 TABLET, FILM COATED ORAL AT BEDTIME
Status: DISCONTINUED | OUTPATIENT
Start: 2017-02-09 | End: 2017-02-10 | Stop reason: HOSPADM

## 2017-02-09 RX ORDER — LORAZEPAM 1 MG/1
1-4 TABLET ORAL EVERY 30 MIN PRN
Status: DISCONTINUED | OUTPATIENT
Start: 2017-02-09 | End: 2017-02-10 | Stop reason: HOSPADM

## 2017-02-09 RX ADMIN — ACAMPROSATE CALCIUM 666 MG: 333 TABLET, DELAYED RELEASE ORAL at 08:13

## 2017-02-09 RX ADMIN — QUETIAPINE FUMARATE 50 MG: 50 TABLET, FILM COATED ORAL at 14:44

## 2017-02-09 RX ADMIN — ACETAMINOPHEN 975 MG: 325 TABLET, FILM COATED ORAL at 02:14

## 2017-02-09 RX ADMIN — DULOXETINE HYDROCHLORIDE 30 MG: 30 CAPSULE, DELAYED RELEASE ORAL at 13:20

## 2017-02-09 RX ADMIN — MIRTAZAPINE 7.5 MG: 7.5 TABLET, FILM COATED ORAL at 22:02

## 2017-02-09 RX ADMIN — ACAMPROSATE CALCIUM 666 MG: 333 TABLET, DELAYED RELEASE ORAL at 16:00

## 2017-02-09 RX ADMIN — Medication 100 MG: at 08:12

## 2017-02-09 RX ADMIN — NICOTINE POLACRILEX 4 MG: 4 LOZENGE ORAL at 11:41

## 2017-02-09 RX ADMIN — HYDROCHLOROTHIAZIDE 25 MG: 25 TABLET ORAL at 08:12

## 2017-02-09 RX ADMIN — NICOTINE POLACRILEX 4 MG: 4 LOZENGE ORAL at 19:40

## 2017-02-09 RX ADMIN — GABAPENTIN 600 MG: 600 TABLET, FILM COATED ORAL at 08:13

## 2017-02-09 RX ADMIN — HYDROXYZINE HYDROCHLORIDE 50 MG: 25 TABLET ORAL at 02:14

## 2017-02-09 RX ADMIN — SPIRONOLACTONE 25 MG: 25 TABLET ORAL at 08:13

## 2017-02-09 RX ADMIN — VENLAFAXINE HYDROCHLORIDE 150 MG: 150 CAPSULE, EXTENDED RELEASE ORAL at 08:13

## 2017-02-09 RX ADMIN — QUETIAPINE FUMARATE 50 MG: 50 TABLET, FILM COATED ORAL at 02:14

## 2017-02-09 RX ADMIN — TRAZODONE HYDROCHLORIDE 200 MG: 100 TABLET ORAL at 22:02

## 2017-02-09 RX ADMIN — MULTIPLE VITAMINS W/ MINERALS TAB 1 TABLET: TAB at 08:13

## 2017-02-09 RX ADMIN — NICOTINE POLACRILEX 8 MG: 4 LOZENGE ORAL at 07:13

## 2017-02-09 RX ADMIN — GABAPENTIN 600 MG: 600 TABLET, FILM COATED ORAL at 16:00

## 2017-02-09 RX ADMIN — Medication 1 G: at 08:13

## 2017-02-09 RX ADMIN — FOLIC ACID 1 MG: 1 TABLET ORAL at 08:13

## 2017-02-09 RX ADMIN — ACAMPROSATE CALCIUM 666 MG: 333 TABLET, DELAYED RELEASE ORAL at 22:02

## 2017-02-09 RX ADMIN — GABAPENTIN 600 MG: 600 TABLET, FILM COATED ORAL at 22:02

## 2017-02-09 RX ADMIN — ATORVASTATIN CALCIUM 10 MG: 10 TABLET, FILM COATED ORAL at 22:02

## 2017-02-09 NOTE — H&P
"MEDICAL HISTORY AND PHYSICAL      DATE OF SERVICE:  02/08/2017      PRIMARY CARE PROVIDER:  Vilma Melara.      HISTORY OF PRESENT ILLNESS:  Jim Richard is a 61-year-old male with past medical history significant for hypertension, hyperlipidemia, coronary artery disease, obstructive sleep apnea, peripheral vascular disease, status post prior interventions, alcohol abuse with history of DTs, history of opioid abuse, and spinal stenosis, who presented to the Emergency Department on 02/07/2017 with suicidal ideation and intentional drug overdose.  The patient has a history of alcohol abuse, and has undergone treatment multiple times.  He is on probation for a DUI that he obtained last year, and part of his probation is to remain alcohol-free.  He has not abided by this, and is worried he will go to long term.  Because of his concern about going to long term, he decided that he wanted to kill himself, and prior to admission he consumed approximately a quart of alcohol and took \"a whole bunch of medication.\"  He does not recall exactly what he took and how much he consumed, as he states he \"blacked out\" from the alcohol.  He did tell one ED provider that he had consumed \"maybe half a bottle\" of each medication.  EMS was summoned, and he was brought to the Emergency Department for evaluation.      He was evaluated in the Emergency Department by Dr. Villa.  EKG was obtained which was unremarkable.  Salicylate and acetaminophen levels were within normal limits.  His alcohol level was elevated at 0.26.  He remained hemodynamically stable in the Emergency Department.  DEC was requested, who recommended that he be admitted to inpatient Psychiatry.      The patient is presently evaluated in his room on the psychiatry floor.  His main complaint at the time of my interview is back pain which he has been experiencing chronically due to spinal stenosis.  He did have a laminectomy in 2014, which he states was failed, and that contributed " to his depression.  He otherwise denies any chest pain, shortness of breath, nausea, vomiting, visual changes or focal weakness.  He denies any recent illness or trauma.  Notably, he recently saw his primary care provider on 02/06/2017 for evaluation of left side pain.  This was felt likely due to muscle strain, and Tylenol and ibuprofen were recommended.      REVIEW OF SYSTEMS:  A 10-point review of systems was conducted and is negative aside from the information in the HPI.      PAST MEDICAL HISTORY:   1.  History of chronic alcoholism.  He has had treatment in both inpatient and outpatient settings in the past.  He continues to drink daily.   2.  History of sleep apnea, currently using CPAP.   3.  History of spinal stenosis, status post L3 to S1 laminectomy in 2014.   4.  History of peripheral vascular disease, status post left femoral-popliteal bypass and femoral endarterectomy.   5.  Hypertension.   6.  Hyperlipidemia.   7.  History of depression and anxiety.   8.  History of hepatomegaly.   9.  History of elevated liver function tests.   10.  History of coronary artery disease with myocardial infarction.  However, the patient denies that he has ever had an MI, and says he has never had any stents placed.   11.  History of diabetes.      PAST SURGICAL HISTORY:   1.  Appendectomy.   2.  Tonsillectomy and adenoidectomy.   3.  History of femoral-popliteal bypass graft in 2012.   4.  History of L3 to S1 laminectomy in 2014 with Dr. Pruitt.      ALLERGIES:  No known drug allergies.      PRIOR TO ADMISSION MEDICATIONS:   1.  Campral 666 mg by mouth t.i.d.   2.  Atorvastatin 10 mg at bedtime.   3.  Gabapentin 600 mg by mouth t.i.d.   4.  Hydroxyzine 50 mg t.i.d. p.r.n. anxiety.   5.  Ibuprofen 400 mg by mouth q. 8 hours p.r.n. moderate pain.   6.  Meloxicam 7.5 mg b.i.d. p.r.n.   7.  Multivitamin 1 tablet by mouth daily.   8.  Omega-3 fatty acids 1 gram by mouth daily.   9.  Seroquel 50 mg by mouth t.i.d. p.r.n.    10.  Spironolactone/hydrochlorothiazide 25-25 mg per tablet, 1 tablet by mouth daily.   11.  Trazodone 200 mg at bedtime.   12.  Effexor 150 mg by mouth daily.      SOCIAL HISTORY:  The patient reports that he is an everyday drinker, consumes approximately 1 pint of liquor daily, and has been drinking daily for at least the past month.  He does have a history of alcohol withdrawal with DTs.  Denies history of withdrawal seizure.  He denies any drug use.  He is a current everyday smoker, and smokes one pack per day.  He lives with a roommate who is sober.      FAMILY HISTORY:  His mother had history of heart disease and father had a history of cancer.      LABORATORY EVALUATION:  CMP is within normal limits aside from an alkaline phosphatase of 152.  CBC on admission shows hemoglobin of 12.2, hematocrit 36.4, otherwise unremarkable.  Acetaminophen level is less than 2.0.  Salicylate level is 3.0.  Alcohol level on admission was 0.26.  Drug screen is negative.      EKG on arrival showed sinus rhythm with right bundle branch block.      PHYSICAL EXAM:   VITAL SIGNS:  Temperature 98.1, heart rate 63, blood pressure 170/86, respiratory rate 15, oxygen saturation 95% on room air.   GENERAL:  Alert and oriented male sitting up in bed, appears comfortable and is appropriately conversant.   EYES:  Pupils are equal and reactive to light, EOMI.   ENT:  Mucous membranes are moist.   CARDIOVASCULAR:  Regular rate and rhythm.  No murmurs appreciated.   RESPIRATORY:  Lungs are clear to auscultation bilaterally.  No increased work of breathing, no wheezing.   GASTROINTESTINAL:  Positive bowel sounds.  Abdomen is soft and nontender.   MUSCULOSKELETAL:  The patient moves all four extremities.  Strength is equal bilaterally.   NEUROLOGIC:  Cranial nerves II-XII grossly intact.  No focal deficits.   SKIN:  Warm and dry.      ASSESSMENT:  Jim Richard is a 61-year-old male with a past medical history significant for hypertension,  "hyperlipidemia, spinal stenosis, obstructive sleep apnea, depression, anxiety, peripheral vascular disease, and alcohol abuse with history of DTs, coronary artery disease, and history of opioid abuse, who was brought to the Emergency Department on 02/07/2017 after intentional drug overdose with suicidal ideation.  He was admitted under Inpatient Psychiatry Service, and Hospitalist Service was requested for medical evaluation.   1.  Suicidal ideation with intentional drug overdose:  Prior to admission, the patient reported drinking a quart of alcohol and consuming \"a whole bunch\" of his medications.  He does not know which medications he took or how much of them he took.  He believes that it was his Effexor, hydroxyzine and trazodone, but again he is unsure of this as he \"blacked out.\"  On arrival, EKG was unremarkable, and the patient remained hemodynamically stable.  Salicylate and acetaminophen levels were negative.  Drug screen was negative.  Electrolytes are within normal limits.  Creatinine, and LFTs are normal.  Primary management is per Inpatient Psychiatry Service.   2.  Hypertension:  We will continue the patient's prior to admission spironolactone and hydrochlorothiazide.  Creatinine was initially slightly up at 1.28, which has normalized today.  Blood pressure this afternoon was notably elevated at 170/86; however, the patient had not received his medications at the time.   3.  Chronic alcohol use, presenting with acute intoxication:  The patient is a daily drinker with a history of alcohol withdrawal with DTs in the past.  His last drink was 02/07/2017.  At present, he does not exhibit any evidence of alcohol withdrawal, and liver function tests are within normal limits.   4.  At present he does not exhibit any evidence of alcohol withdrawal, and LFTs are within normal limits.  [facility] vital signs are in place.  We will defer management to Psychiatry.  Daily multivitamin, thiamine and folic acid are " ordered.   5.  Obstructive sleep apnea:  CPAP ordered per home settings.   6.  Spinal stenosis, status post laminectomy L3 through S1 in :  The patient reports that he has had flaring of his back pain recently.  He tells me he has been prescribed oxycodone by a few different providers over the past couple of months, but that he generally uses ibuprofen or meloxicam for pain at home.  We will continue prior to admission meloxicam.  The patient should not be using both this and ibuprofen, so we will discontinue ibuprofen.  Tylenol p.r.n. breakthrough pain.  No plan for narcotics with the patient's history of opioid abuse.  Continue prior to admission gabapentin.   7.  Coronary artery disease with reported prior myocardial infarction:  The patient does not recall any MI in the past.  Denies any recent chest pain or shortness of breath.  Continue prior to admission atorvastatin.   8.  Tobacco use:  The patient is a current everyday smoker, one pack per day.  Nicotine lozenges are available.      The patient is medically stable at this time.  The Hospitalist Service will sign off.  Please do not hesitate to call with any questions or concerns.      The patient was seen and discussed with Dr. Donell Orozco who agrees with the above plan.         DONELL OROZCO MD       As dictated by ADOLFO JACOBSON PA-C            D: 2017 01:43   T: 2017 03:18   MT: [facility]MM#[id number]      Name:     DEVONTE SEGURA   MRN:      [id number]        Account:      [id number]   :      1955           Admitted:     [id number]      Document: [id number]       cc: Vil[facility]        Brody Rand MD

## 2017-02-09 NOTE — PLAN OF CARE
Problem: Discharge Planning  Goal: Discharge Planning (Adult, OB, Behavioral, Peds)  Patient attended Process Group and participated appropriately. Patient demonstrated good insight into the topic of support systems.

## 2017-02-09 NOTE — H&P
"[facility] Psychiatric H&P Note       Initial History   The patient's care was discussed with the treatment team and chart notes were reviewed.     Patient examined for psychiatric admission.     IDENTIFICATION    Pt is a 61 year old  male currently lives in [place]. Pt sees PCP Dr. Elias. He sees Dr. Pritchett for psychiatry. Pt seen on [facility] for psychiatric evaluation following presentation of polydrug consumption and EtOH abuse.    HISTORY OF PRESENT ILLNESS    Jim Richard is a 61-year-old male with past medical history significant for hypertension, hyperlipidemia, coronary artery disease, obstructive sleep apnea, peripheral vascular disease, status post prior interventions, alcohol abuse with history of DTs, history of opioid abuse, and spinal stenosis, who presented to the Emergency Department on 02/07/2017 with depression, suicidal ideation, and intentional drug overdose.  He is on probation for a DUI that he obtained last year, and part of his probation is to remain alcohol-free.  He has not adhered to this, and is worried he will go to senior living.  Because of his concern about going to senior living, he decided that he wanted to kill himself, and prior to admission he consumed approximately a quart of alcohol and took \"a whole bunch of medication, I took depression pills, and then my sleep pills, I knew I wasn't supposed to drink.\"  He does not recall exactly what he took and how much he consumed, as he states he \"blacked out\" from the alcohol. He has not contacted his  as of yet. EMS was contacted, and he was brought to the Emergency Department for evaluation. He had a Rule 25 assessment one week ago and is currently awaiting a response from [facility] when he will be scheduled for intake.     Pt endorses symptoms related to excessive alcohol use, including over drinking, unsuccessful attempts to curb use, craving its use, having it " interfere with work and personal relationships, and continued use despite known bad consequences. Pt also has increased tolerance.  Pt has experienced withdrawal symptoms including tremors, insomnia, DTs, and increased anxiety.  Pt has severely depressed mood, motivation poor, concentration poor, low energy, hopeless, helpless, and worthless with SI, no plan, able to contract for safety. Pt endorses poor sleep architecture. Pt is an anxious person, a worrier.      CHEMICAL DEPENDENCY HISTORY    Pt has a storied history of EtOH abuse. He has had two prior formal CD treatments. He does not endorse other illicit substance use. Utox revealed BAL of 0.26 on admission.    PAST PSYCHIATRIC HISTORY    Pt states he is currently on Effexor, Seroquel, and Hydroxyzine. He was previously started on Remeron, but has not continued this since he was discharged in October of 2016 as his current psychiatrist had felt that this was not needed. He has been seen by Dr. Rand at [facility] once in 2016 and once in 2015, both for alcohol issues and depressive symptoms. He was previously seen at [facility] in 2014 as well.     FAMILY HISTORY    No history or mental illness or substance abuse reported.    SOCIAL HISTORY     The patient grew up in [place], raised by both parents.  One of 2 siblings.  He graduated from high school.  He completed technical college.  Worked as a printer for a number of years.  They have been  for 28 years,  a year and a half ago.  His youngest has fragile X syndrome residing in a group home.  The patient states he has not worked in 7 months, probably more than that.       Medications     Prescriptions prior to admission   Medication Sig Dispense Refill Last Dose     acamprosate (CAMPRAL) 333 MG EC tablet Take 666 mg by mouth 3 times daily    2/7/2017 at Unknown time     MELOXICAM PO Take 7.5 mg by mouth 2 times daily as needed    Past Week at Unknown time     ATORVASTATIN CALCIUM PO Take  10 mg by mouth At Bedtime    2/7/2017 at Unknown time     spironolactone-hydrochlorothiazide (ALDACTAZIDE) 25-25 MG per tablet Take 1 tablet by mouth daily   2/7/2017 at Unknown time     QUETIAPINE FUMARATE PO Take 50 mg by mouth 3 times daily as needed   prn med     GABAPENTIN PO Take 600 mg by mouth 3 times daily    2/7/2017 at Unknown time     ibuprofen (ADVIL,MOTRIN) 400 MG tablet Take 1 tablet (400 mg) by mouth every 8 hours as needed for moderate pain 120 tablet 1 Past Week at Unknown time     TRAZODONE HCL PO Take 200 mg by mouth At Bedtime    2/7/2017 at pm     venlafaxine (EFFEXOR-XR) 150 MG 24 hr capsule Take 150 mg by mouth daily   2/7/2017 at Unknown time     multivitamin, therapeutic with minerals (THERA-VIT-M) TABS Take 1 tablet by mouth daily   Past Month at Unknown time     Omega-3 Fatty Acids (OMEGA-3 FISH OIL PO) Take 1 g by mouth daily   Past Month at Unknown time     HYDROXYZINE HCL PO Take 50 mg by mouth 3 times daily as needed for other (anxiety)    prn med       Scheduled Medications:    thiamine  100 mg Oral Daily     folic acid  1 mg Oral Daily     multivitamin, therapeutic with minerals  1 tablet Oral Daily     acamprosate  666 mg Oral TID     atorvastatin (LIPITOR) tablet 10 mg  10 mg Oral At Bedtime     gabapentin (NEURONTIN) tablet 600 mg  600 mg Oral TID     fish oil-omega-3 fatty acids capsule 1 g  1 g Oral Daily     traZODone (DESYREL) tablet 200 mg  200 mg Oral At Bedtime     venlafaxine  150 mg Oral Daily     spironolactone  25 mg Oral Daily     hydrochlorothiazide  25 mg Oral Daily     PRNs:  LORazepam **OR** LORazepam, hydrOXYzine, nicotine polacrilex, QUEtiapine (SEROquel) tablet 50 mg, celecoxib, acetaminophen      Allergies    No Known Allergies     Previous Medical History     Past Medical History   Diagnosis Date     Spinal stenosis      Hypertension      Hyperlipemia      Depression      Sleep apnea      Peripheral vascular disease (H)      Alcoholism (H) 1/14/2013      "Anxiety      Hepatomegaly      PVD (peripheral vascular disease) (H)      Heart attack (H)      Coronary artery disease         Medical Review of Systems   /88 mmHg  Pulse 61  Temp(Src) 97.3  F (36.3  C) (Axillary)  Resp 17  Ht 1.702 m (5' 7\")  Wt 98.022 kg (216 lb 1.6 oz)  BMI 33.84 kg/m2  SpO2 95%  Body mass index is 33.84 kg/(m^2).  Previous 10-point ROS completed by Rupinder Kay PA-C on 2/9/17 reviewed by Brody Rand MD on February 9, 2017 and is unchanged except for those problems mentioned within the HPI.     Mental Status Examination     Appearance Sitting in bed, dressed in scrubs. Appears stated age.   Attitude Cooperative   Orientation Oriented to person, place, time   Eye Contact Fair   Speech Regular rate, rhythm, volume and tone   Language Normal   Psychomotor Behavior Normal   Mood Depressed   Affect Flat, constricted   Thought Process Goal-Oriented, Intact   Associations Intact   Thought Content Patient is currently negative for suicide ideation, negative for plan or intent, able to contract no self harm and identify barriers to suicide.  Negative for obsessions, compulsions or psychosis.      Fund of Knowledge Average   Insight Poor   Judgement Limited   Attention Span & Concentration Intact   Recent & Remote Memory Intact   Gait Normal      Labs   Labs reviewed.  Recent Results (from the past 24 hour(s))   TSH with free T4 reflex and/or T3 as indicated    Collection Time: 02/08/17  4:40 PM   Result Value Ref Range    TSH 1.51 0.40 - 4.00 mU/L   Comprehensive metabolic panel    Collection Time: 02/08/17  4:40 PM   Result Value Ref Range    Sodium 133 133 - 144 mmol/L    Potassium 4.1 3.4 - 5.3 mmol/L    Chloride 98 94 - 109 mmol/L    Carbon Dioxide 24 20 - 32 mmol/L    Anion Gap 11 3 - 14 mmol/L    Glucose 86 70 - 99 mg/dL    Urea Nitrogen 24 7 - 30 mg/dL    Creatinine 1.08 0.66 - 1.25 mg/dL    GFR Estimate 69 >60 mL/min/1.7m2    GFR Estimate If Black 84 >60 mL/min/1.7m2    " Calcium 8.8 8.5 - 10.1 mg/dL    Bilirubin Total 0.5 0.2 - 1.3 mg/dL    Albumin 3.6 3.4 - 5.0 g/dL    Protein Total 7.4 6.8 - 8.8 g/dL    Alkaline Phosphatase 152 (H) 40 - 150 U/L    ALT 30 0 - 70 U/L    AST 31 0 - 45 U/L        Impression   This is a 61 year old male with Major depression, recurrent, severe, without psychotic features, and alcohol dependence.     Discussed starting pt on Cymbalta.  Reviewed the side effects, benefits, and complications of medication. Pt gave verbal agreement to begin Cymbalta 30mg qam. Restart Remeron 7.5mg qhs. As Dr. Rand is the medical director of the [facility], he will attempt to expedite his intake. Dr. Rand encouraged pt to practice Freeze Frame Exercise -- to think of one specific extremely positive memory multiple times a day to preemptively keep pt from becoming more anxious and depressed.  Dr. Rand introduced pt to the ideas of barriers to use. Discussed chemical, physical and financial barriers to chemical use. Pt verbalized understanding. Pt to move to SDU.     Diagnoses   Major depression, recurrent, severe, without psychotic features.   Alcohol dependence.     Plan     1. Explained side effects, benefits, and complications of medications to the patient, Pt gave verbal consent.  2. Medication changes: Begin Cymbalta 30mg qam. Begin Remeron 7.5mg qhs.  3. Discussed treatment plan with patient and team.  4. Projected length of stay: Until intake at [facility] has been scheduled.    Attestation:   Patient has been seen and evaluated by me, Brody Rand MD.    Patient ID:  Name: Jim Richard  MRN: [id number]  Admission: 2/7/2017   YOB: 1955

## 2017-02-09 NOTE — PLAN OF CARE
Problem: Depressive Symptoms  Goal: Depressive Symptoms  Signs and symptoms of listed problems will be absent or manageable.   Pt was active on the unit socializing with his peers.  He attended groups and participated appropriately.  Pt spent the morning calling different locations for his rule 25 as well as trying to get in contact with his .  In 1:1 pt states that he is feeling very anxious and depressed about having to speak with his  and going to assisted soon. Overall, pt has been pleasant and cooperative when interacting with others.

## 2017-02-10 VITALS
DIASTOLIC BLOOD PRESSURE: 87 MMHG | TEMPERATURE: 98.1 F | HEIGHT: 67 IN | BODY MASS INDEX: 33.92 KG/M2 | RESPIRATION RATE: 16 BRPM | WEIGHT: 216.1 LBS | OXYGEN SATURATION: 95 % | HEART RATE: 64 BPM | SYSTOLIC BLOOD PRESSURE: 159 MMHG

## 2017-02-10 PROCEDURE — 94660 CPAP INITIATION&MGMT: CPT

## 2017-02-10 PROCEDURE — A9270 NON-COVERED ITEM OR SERVICE: HCPCS | Mod: GY | Performed by: PSYCHIATRY & NEUROLOGY

## 2017-02-10 PROCEDURE — A9270 NON-COVERED ITEM OR SERVICE: HCPCS | Mod: GY

## 2017-02-10 PROCEDURE — 40000275 ZZH STATISTIC RCP TIME EA 10 MIN

## 2017-02-10 PROCEDURE — 25000132 ZZH RX MED GY IP 250 OP 250 PS 637: Mod: GY | Performed by: PSYCHIATRY & NEUROLOGY

## 2017-02-10 PROCEDURE — 25000132 ZZH RX MED GY IP 250 OP 250 PS 637: Mod: GY

## 2017-02-10 RX ORDER — DULOXETIN HYDROCHLORIDE 60 MG/1
60 CAPSULE, DELAYED RELEASE ORAL DAILY
Qty: 30 CAPSULE | Refills: 0 | Status: ON HOLD | OUTPATIENT
Start: 2017-02-11 | End: 2019-10-30

## 2017-02-10 RX ORDER — DISULFIRAM 250 MG/1
250 TABLET ORAL DAILY
Status: DISCONTINUED | OUTPATIENT
Start: 2017-02-10 | End: 2017-02-10 | Stop reason: HOSPADM

## 2017-02-10 RX ORDER — DULOXETIN HYDROCHLORIDE 60 MG/1
60 CAPSULE, DELAYED RELEASE ORAL DAILY
Status: DISCONTINUED | OUTPATIENT
Start: 2017-02-11 | End: 2017-02-10 | Stop reason: HOSPADM

## 2017-02-10 RX ORDER — MIRTAZAPINE 15 MG/1
15 TABLET, FILM COATED ORAL AT BEDTIME
Qty: 30 TABLET | Refills: 0 | Status: SHIPPED | OUTPATIENT
Start: 2017-02-10 | End: 2018-03-21

## 2017-02-10 RX ORDER — DISULFIRAM 250 MG/1
250 TABLET ORAL DAILY
Qty: 30 TABLET | Refills: 0 | Status: SHIPPED | OUTPATIENT
Start: 2017-02-10 | End: 2017-12-20

## 2017-02-10 RX ADMIN — MULTIPLE VITAMINS W/ MINERALS TAB 1 TABLET: TAB at 08:03

## 2017-02-10 RX ADMIN — QUETIAPINE FUMARATE 50 MG: 50 TABLET, FILM COATED ORAL at 14:34

## 2017-02-10 RX ADMIN — FOLIC ACID 1 MG: 1 TABLET ORAL at 08:03

## 2017-02-10 RX ADMIN — DULOXETINE HYDROCHLORIDE 30 MG: 30 CAPSULE, DELAYED RELEASE ORAL at 08:03

## 2017-02-10 RX ADMIN — HYDROCHLOROTHIAZIDE 25 MG: 25 TABLET ORAL at 08:03

## 2017-02-10 RX ADMIN — LORAZEPAM 1 MG: 1 TABLET ORAL at 07:20

## 2017-02-10 RX ADMIN — ACAMPROSATE CALCIUM 666 MG: 333 TABLET, DELAYED RELEASE ORAL at 08:03

## 2017-02-10 RX ADMIN — Medication 100 MG: at 08:03

## 2017-02-10 RX ADMIN — DISULFIRAM 250 MG: 250 TABLET ORAL at 11:27

## 2017-02-10 RX ADMIN — NICOTINE POLACRILEX 4 MG: 4 LOZENGE ORAL at 11:50

## 2017-02-10 RX ADMIN — GABAPENTIN 600 MG: 600 TABLET, FILM COATED ORAL at 08:03

## 2017-02-10 RX ADMIN — VENLAFAXINE HYDROCHLORIDE 150 MG: 150 CAPSULE, EXTENDED RELEASE ORAL at 08:03

## 2017-02-10 RX ADMIN — Medication 1 G: at 08:03

## 2017-02-10 RX ADMIN — SPIRONOLACTONE 25 MG: 25 TABLET ORAL at 08:03

## 2017-02-10 NOTE — DISCHARGE INSTRUCTIONS
Behavioral Discharge Planning and Instructions    Summary:  Admitted with intentional drug overdose with suicidal ideation    Main Diagnosis:  Major Depression, recurrent, severe, without psychotic features; Alcohol Dependence    Major Treatments, Procedures and Findings: Psychiatric assessment    Symptoms to Report: Losing more sleep, Mood getting worse or Thoughts of suicide    Lifestyle Adjustment: Follow all treatment recommendations, including completing chemical dependency treatment. Develop and follow safety plan. Maintain sober lifestyle (AA meetings and sponsor).     Psychiatry Follow-up:     You are being referred to [facility] for chemical dependency treatment. Please contact them next week to see when a bed will be available for you. Once you are in the treatment program, you will follow up weekly with Dr. Rand at [facility].     [facility]  [address] [place] [id number] [phone] / [phone] fax    Resources:   Crisis Intervention: 689.851.2362 or 185-545-0661 (TTY: 651.203.8436).  Call anytime for help.  National Fellows on Mental Illness (www.mn.debra.org): 282.188.4752 or 626-787-2694.  Alcoholics Anonymous (www.alcoholics-anonymous.org): Check your phone book for your local chapter.  National Suicide Prevention Line (www.mentalhealthmn.org): 245-338-EFRK (6342)    General Medication Instructions:   See your medication sheet(s) for instructions.   Take all medicines as directed.  Make no changes unless your doctor suggests them.   Go to all your doctor visits.  Be sure to have all your required lab tests. This way, your medicines can be refilled on time.  Do not use any drugs not prescribed by your doctor.  Avoid alcohol.

## 2017-02-10 NOTE — PROGRESS NOTES
[facility] Psychiatric Progress Note       Interim History   The patient's care was discussed with the treatment team and chart notes were reviewed. Pt seen on SDU. Tolerating medications without side effects. Side effects, risks, and benefits of medications reviewed with patient. Pt is stabilizing on current medications. Informed pt that there is currently a week until he will be able to get into [facility]. He believes that Cymbalta and Remeron have improved his mood thus far. Increase Cymbalta to 60mg qam. Discussed starting pt on Antabuse.  Reviewed the side effects, benefits, and complications of medication. Pt gave verbal agreement to begin Antabuse 250mg qam. He continues to endorse poor sleep architecture as he does not have his own cpap. He would like to discharge today. He states he has two neighbors to watch him take Antabuse. He also has a sober roommate. Pt to call his PO in order to inform him that he was hospitalized. Dr. Rand strongly suggested that he remain at the hospital to prove that he is remaining sober. However, pt is hesitant to remaining at the hospital. One of pt's neighbors or his roommate will need to call the station informing staff that they will watch pt take Antabuse at least two times a week. Pt to discharge today. Denies suicidal or homicidal ideation. 30 day supply of medication provided at time of discharge.      Medications     Current Facility-Administered Medications:    thiamine  100 mg Oral Daily     folic acid  1 mg Oral Daily     multivitamin, therapeutic with minerals  1 tablet Oral Daily     DULoxetine  30 mg Oral Daily     mirtazapine  7.5 mg Oral At Bedtime     acamprosate  666 mg Oral TID     atorvastatin (LIPITOR) tablet 10 mg  10 mg Oral At Bedtime     gabapentin (NEURONTIN) tablet 600 mg  600 mg Oral TID     fish oil-omega-3 fatty acids capsule 1 g  1 g Oral Daily     traZODone (DESYREL) tablet 200 mg  200  "mg Oral At Bedtime     venlafaxine  150 mg Oral Daily     spironolactone  25 mg Oral Daily     hydrochlorothiazide  25 mg Oral Daily     PRNs:  LORazepam **OR** LORazepam, hydrOXYzine, nicotine polacrilex, QUEtiapine (SEROquel) tablet 50 mg, celecoxib, acetaminophen      Allergies    No Known Allergies     Medical Review of Systems   /87 mmHg  Pulse 64  Temp(Src) 98.1  F (36.7  C) (Oral)  Resp 16  Ht 1.702 m (5' 7\")  Wt 98.022 kg (216 lb 1.6 oz)  BMI 33.84 kg/m2  SpO2 95%  Body mass index is 33.84 kg/(m^2).  A 10-point review of systems was performed by Dr. Rand and is negative, no new findings.      Psychiatric Examination     Appearance Sitting in chair, dressed in scrubs. Appears stated age.   Attitude Cooperative   Orientation Oriented to person, place, time   Eye Contact Poor   Speech Regular rate, rhythm, volume and tone   Language Normal   Psychomotor Behavior Normal   Mood Much less depressed   Affect Gaining range   Thought Process Goal-Oriented, Intact   Associations Intact   Thought Content Patient is currently negative for suicide ideation, negative for plan or intent, able to contract no self harm and identify barriers to suicide.  Negative for obsessions, compulsions or psychosis.      Fund of Knowledge Average   Insight Improving   Judgement Fair   Attention Span & Concentration Alert   Recent & Remote Memory Intact   Gait Normal          Labs   Labs reviewed.  No results found for this or any previous visit (from the past 24 hour(s)).       Impression    This is a 61 year old male with Major depression, recurrent, severe, without psychotic features, and alcohol dependence. At this point, he will wait until intake can be scheduled at [facility].   Diagnoses  1.   Major depression, recurrent, severe, without psychotic features.    2. Alcohol dependence.         Plan     1. Explained side effects, benefits, and complications of medications to the patient, Pt gave verbal " consent.  2. Medication changes: Increase Cymbalta to 60mg. Increase Remeron to 15mg qhs. Begin Antabuse 250mg qam.  3. Discussed treatment plan with patient and team.  4. Projected length of stay: Pt to discharge today.    Attestation:   Patient has been seen and evaluated by me, Brody Rand MD.    Patient ID:  Name: Jim Richard  MRN: [id number]  Admission: 2/7/2017   YOB: 1955

## 2017-02-10 NOTE — PROGRESS NOTES
Discharge instructions reviewed with patient including follow-up care plan. Educated on medication regime including review of name, dose, route, frequency, side effects, and next dose needed.  Advised not to stop prescribed medication without consulting their physician.  Receptive to instructions and teachings.  Copy of AVS given to patient. Coping skills and support network reviewed.  Denied SI. All belongings which where brought into the hospital have been returned to patient.  Brought by staff downstairs to be picked up by sponsor. Pt D/C'd at 1445.

## 2017-02-10 NOTE — PLAN OF CARE
Problem: Depressive Symptoms  Goal: Depressive Symptoms  Signs and symptoms of listed problems will be absent or manageable.   Pt. not demonstrating any symptoms of alcohol withdrawal. Anxious and depressed about legal situation. Denies current suicidal ideation. Interactive with peers and attended milieu programming.

## 2017-02-10 NOTE — PLAN OF CARE
Problem: General Rehab Plan of Care  Goal: Occupational Therapy Goals  The patient and/or their representative will achieve their patient-specific goals related to the plan of care.  The patient-specific goals include:  INITIAL O.T. ASSESSMENT   Details:  Pt attended OT group on 2/9/17 and opted to observe only. He pleasantly conversed with staff and peers. Speech was articulate and clear. Pt state he enjoys watching sports but wasn't able to identify any hobbies. Pt was polite in expressing his needs.

## 2017-03-20 NOTE — DISCHARGE SUMMARY
ADMISSION DATE:  2017   DISCHARGE DATE:  02/10/2017      PRIMARY CARE PHYSICIAN:  Dr. Elias      IDENTIFICATION:  Mr. Devonte Richard is a 62-year-old  male who lives in [place], sees his primary doctor, Dr. Elias, sees psychiatrist, Dr. Pritchett at [facility].  For history, see dictation by Dr. Rand on 2017.      HOSPITAL COURSE:  Mr. Richard was admitted to the hospital after a polysubstance overdose.  The patient was severely depressed.  He was started on Cymbalta, which was increased from 30 to 60, started on Remeron 7.5 mg.  The patient was much improved and ready for discharge.  He had a physical exam by Rupinder Kay PA-C.  No new medical issues were addressed.  The patient's labs were in that dictation.      DISCHARGE DIAGNOSES:     1.  Major depression, recurrent, severe without psychotic features.   2.  Alcohol use disorder.      DISCHARGE MEDICATIONS:   1.  Remeron 15 mg at night.   2.  Cymbalta 60 mg a day.   3.  Antabuse 250 mg daily, have someone watch him take it.      DISCHARGE PLAN:  The patient will be discharged home if friends and family will watch him take his Antabuse.      DISCHARGE FOLLOWUP:  The patient referred to [facility] in [place] and follow up with Dr. Rand through that treatment center.         CAIO RAND MD             D: 2017 14:35   T: 2017 19:38   MT: LQ      Name:     DEVONTE RICHARD   MRN:      [id number]        Account:        [id number]   :      1955           Admit Date:                                       Discharge Date: 02/10/2017      Document: [id number]       cc: Talon Elias MD

## 2017-12-20 ENCOUNTER — HOSPITAL ENCOUNTER (EMERGENCY)
Facility: CLINIC | Age: 62
Discharge: PSYCHIATRIC OR SUBSTANCE USE FACILITY | End: 2017-12-21
Attending: EMERGENCY MEDICINE | Admitting: EMERGENCY MEDICINE
Payer: MEDICARE

## 2017-12-20 DIAGNOSIS — R45.851 SUICIDAL THOUGHTS: ICD-10-CM

## 2017-12-20 PROCEDURE — 99285 EMERGENCY DEPT VISIT HI MDM: CPT | Mod: 25

## 2017-12-20 PROCEDURE — 25000132 ZZH RX MED GY IP 250 OP 250 PS 637: Mod: GY | Performed by: EMERGENCY MEDICINE

## 2017-12-20 PROCEDURE — 82075 ASSAY OF BREATH ETHANOL: CPT

## 2017-12-20 PROCEDURE — 90791 PSYCH DIAGNOSTIC EVALUATION: CPT

## 2017-12-20 PROCEDURE — A9270 NON-COVERED ITEM OR SERVICE: HCPCS | Mod: GY | Performed by: EMERGENCY MEDICINE

## 2017-12-20 RX ORDER — QUETIAPINE FUMARATE 50 MG/1
50 TABLET, FILM COATED ORAL 2 TIMES DAILY
Status: DISCONTINUED | OUTPATIENT
Start: 2017-12-20 | End: 2017-12-21 | Stop reason: HOSPADM

## 2017-12-20 RX ORDER — HYDROXYZINE HYDROCHLORIDE 25 MG/1
50 TABLET, FILM COATED ORAL EVERY 6 HOURS PRN
Status: DISCONTINUED | OUTPATIENT
Start: 2017-12-20 | End: 2017-12-21 | Stop reason: HOSPADM

## 2017-12-20 RX ORDER — ALBUTEROL SULFATE 90 UG/1
2 AEROSOL, METERED RESPIRATORY (INHALATION) EVERY 6 HOURS PRN
Status: ON HOLD | COMMUNITY
End: 2020-04-08

## 2017-12-20 RX ADMIN — HYDROXYZINE HYDROCHLORIDE 50 MG: 25 TABLET ORAL at 21:17

## 2017-12-20 RX ADMIN — QUETIAPINE FUMARATE 50 MG: 50 TABLET, FILM COATED ORAL at 21:17

## 2017-12-20 ASSESSMENT — ENCOUNTER SYMPTOMS: NERVOUS/ANXIOUS: 1

## 2017-12-21 ENCOUNTER — HOSPITAL ENCOUNTER (OUTPATIENT)
Facility: CLINIC | Age: 62
Discharge: HOME | DRG: 885 | End: 2017-12-21
Attending: PSYCHIATRY & NEUROLOGY | Admitting: PSYCHIATRY & NEUROLOGY
Payer: MEDICARE

## 2017-12-21 VITALS
HEART RATE: 66 BPM | HEIGHT: 67 IN | TEMPERATURE: 95.8 F | BODY MASS INDEX: 32.65 KG/M2 | SYSTOLIC BLOOD PRESSURE: 185 MMHG | DIASTOLIC BLOOD PRESSURE: 83 MMHG | RESPIRATION RATE: 16 BRPM | WEIGHT: 208 LBS

## 2017-12-21 VITALS
HEIGHT: 67 IN | OXYGEN SATURATION: 94 % | SYSTOLIC BLOOD PRESSURE: 153 MMHG | TEMPERATURE: 98 F | WEIGHT: 205 LBS | RESPIRATION RATE: 16 BRPM | BODY MASS INDEX: 32.18 KG/M2 | DIASTOLIC BLOOD PRESSURE: 78 MMHG

## 2017-12-21 LAB
ALBUMIN SERPL-MCNC: 3.4 G/DL (ref 3.4–5)
ALCOHOL BREATH TEST: 0.1 (ref 0–0.01)
ALP SERPL-CCNC: 189 U/L (ref 40–150)
ALT SERPL W P-5'-P-CCNC: 37 U/L (ref 0–70)
AMPHETAMINES UR QL SCN: NEGATIVE
ANION GAP SERPL CALCULATED.3IONS-SCNC: 6 MMOL/L (ref 3–14)
AST SERPL W P-5'-P-CCNC: 56 U/L (ref 0–45)
BARBITURATES UR QL: NEGATIVE
BASOPHILS # BLD AUTO: 0 10E9/L (ref 0–0.2)
BASOPHILS NFR BLD AUTO: 0.6 %
BENZODIAZ UR QL: NEGATIVE
BILIRUB SERPL-MCNC: 0.6 MG/DL (ref 0.2–1.3)
BUN SERPL-MCNC: 15 MG/DL (ref 7–30)
CALCIUM SERPL-MCNC: 9.1 MG/DL (ref 8.5–10.1)
CANNABINOIDS UR QL SCN: NEGATIVE
CHLORIDE SERPL-SCNC: 104 MMOL/L (ref 94–109)
CO2 SERPL-SCNC: 29 MMOL/L (ref 20–32)
COCAINE UR QL: NEGATIVE
CREAT SERPL-MCNC: 0.91 MG/DL (ref 0.66–1.25)
DIFFERENTIAL METHOD BLD: ABNORMAL
EOSINOPHIL # BLD AUTO: 0 10E9/L (ref 0–0.7)
EOSINOPHIL NFR BLD AUTO: 0.6 %
ERYTHROCYTE [DISTWIDTH] IN BLOOD BY AUTOMATED COUNT: 15 % (ref 10–15)
GFR SERPL CREATININE-BSD FRML MDRD: 84 ML/MIN/1.7M2
GLUCOSE SERPL-MCNC: 120 MG/DL (ref 70–99)
HCT VFR BLD AUTO: 36.6 % (ref 40–53)
HGB BLD-MCNC: 11.9 G/DL (ref 13.3–17.7)
IMM GRANULOCYTES # BLD: 0 10E9/L (ref 0–0.4)
IMM GRANULOCYTES NFR BLD: 0.2 %
LYMPHOCYTES # BLD AUTO: 0.8 10E9/L (ref 0.8–5.3)
LYMPHOCYTES NFR BLD AUTO: 17.6 %
MCH RBC QN AUTO: 29.5 PG (ref 26.5–33)
MCHC RBC AUTO-ENTMCNC: 32.5 G/DL (ref 31.5–36.5)
MCV RBC AUTO: 91 FL (ref 78–100)
MONOCYTES # BLD AUTO: 0.7 10E9/L (ref 0–1.3)
MONOCYTES NFR BLD AUTO: 14 %
NEUTROPHILS # BLD AUTO: 3.2 10E9/L (ref 1.6–8.3)
NEUTROPHILS NFR BLD AUTO: 67 %
NRBC # BLD AUTO: 0 10*3/UL
NRBC BLD AUTO-RTO: 0 /100
OPIATES UR QL SCN: NEGATIVE
PCP UR QL SCN: NEGATIVE
PLATELET # BLD AUTO: 176 10E9/L (ref 150–450)
POTASSIUM SERPL-SCNC: 4.3 MMOL/L (ref 3.4–5.3)
PROT SERPL-MCNC: 7.1 G/DL (ref 6.8–8.8)
RBC # BLD AUTO: 4.03 10E12/L (ref 4.4–5.9)
SODIUM SERPL-SCNC: 139 MMOL/L (ref 133–144)
WBC # BLD AUTO: 4.8 10E9/L (ref 4–11)

## 2017-12-21 PROCEDURE — 25000132 ZZH RX MED GY IP 250 OP 250 PS 637: Mod: GY | Performed by: EMERGENCY MEDICINE

## 2017-12-21 PROCEDURE — 25000132 ZZH RX MED GY IP 250 OP 250 PS 637: Mod: GY | Performed by: PSYCHIATRY & NEUROLOGY

## 2017-12-21 PROCEDURE — 99222 1ST HOSP IP/OBS MODERATE 55: CPT | Performed by: PHYSICIAN ASSISTANT

## 2017-12-21 PROCEDURE — A9270 NON-COVERED ITEM OR SERVICE: HCPCS | Mod: GY | Performed by: PSYCHIATRY & NEUROLOGY

## 2017-12-21 PROCEDURE — 99207 ZZC CONSULT E&M CHANGED TO INITIAL LEVEL: CPT | Performed by: PHYSICIAN ASSISTANT

## 2017-12-21 PROCEDURE — A9270 NON-COVERED ITEM OR SERVICE: HCPCS | Mod: GY | Performed by: EMERGENCY MEDICINE

## 2017-12-21 PROCEDURE — 80053 COMPREHEN METABOLIC PANEL: CPT | Performed by: PHYSICIAN ASSISTANT

## 2017-12-21 PROCEDURE — 85025 COMPLETE CBC W/AUTO DIFF WBC: CPT | Performed by: PHYSICIAN ASSISTANT

## 2017-12-21 PROCEDURE — 80307 DRUG TEST PRSMV CHEM ANLYZR: CPT | Performed by: EMERGENCY MEDICINE

## 2017-12-21 PROCEDURE — 99235 HOSP IP/OBS SAME DATE MOD 70: CPT | Performed by: PSYCHIATRY & NEUROLOGY

## 2017-12-21 PROCEDURE — 99207 ZZC CDG-MDM COMPONENT: MEETS LOW - DOWN CODED: CPT | Performed by: PSYCHIATRY & NEUROLOGY

## 2017-12-21 PROCEDURE — 25000132 ZZH RX MED GY IP 250 OP 250 PS 637: Mod: GY | Performed by: PHYSICIAN ASSISTANT

## 2017-12-21 PROCEDURE — 12400001 ZZH R&B MH UMMC

## 2017-12-21 PROCEDURE — A9270 NON-COVERED ITEM OR SERVICE: HCPCS | Mod: GY | Performed by: PHYSICIAN ASSISTANT

## 2017-12-21 PROCEDURE — 36415 COLL VENOUS BLD VENIPUNCTURE: CPT | Performed by: PHYSICIAN ASSISTANT

## 2017-12-21 RX ORDER — DULOXETIN HYDROCHLORIDE 60 MG/1
60 CAPSULE, DELAYED RELEASE ORAL DAILY
Status: DISCONTINUED | OUTPATIENT
Start: 2017-12-21 | End: 2017-12-21 | Stop reason: HOSPADM

## 2017-12-21 RX ORDER — BISACODYL 10 MG
10 SUPPOSITORY, RECTAL RECTAL DAILY PRN
Status: DISCONTINUED | OUTPATIENT
Start: 2017-12-21 | End: 2017-12-21 | Stop reason: HOSPADM

## 2017-12-21 RX ORDER — MIRTAZAPINE 15 MG/1
15 TABLET, FILM COATED ORAL AT BEDTIME
Status: DISCONTINUED | OUTPATIENT
Start: 2017-12-21 | End: 2017-12-21 | Stop reason: HOSPADM

## 2017-12-21 RX ORDER — ALUMINA, MAGNESIA, AND SIMETHICONE 2400; 2400; 240 MG/30ML; MG/30ML; MG/30ML
30 SUSPENSION ORAL EVERY 4 HOURS PRN
Status: DISCONTINUED | OUTPATIENT
Start: 2017-12-21 | End: 2017-12-21 | Stop reason: HOSPADM

## 2017-12-21 RX ORDER — NICOTINE 21 MG/24HR
1 PATCH, TRANSDERMAL 24 HOURS TRANSDERMAL DAILY
Status: DISCONTINUED | OUTPATIENT
Start: 2017-12-21 | End: 2017-12-21 | Stop reason: HOSPADM

## 2017-12-21 RX ORDER — QUETIAPINE FUMARATE 50 MG/1
50 TABLET, FILM COATED ORAL 3 TIMES DAILY PRN
Status: DISCONTINUED | OUTPATIENT
Start: 2017-12-21 | End: 2017-12-21 | Stop reason: HOSPADM

## 2017-12-21 RX ORDER — QUETIAPINE FUMARATE 50 MG/1
50 TABLET, FILM COATED ORAL AT BEDTIME
Status: DISCONTINUED | OUTPATIENT
Start: 2017-12-21 | End: 2017-12-21 | Stop reason: HOSPADM

## 2017-12-21 RX ORDER — TRAZODONE HYDROCHLORIDE 50 MG/1
50 TABLET, FILM COATED ORAL
Status: DISCONTINUED | OUTPATIENT
Start: 2017-12-21 | End: 2017-12-21 | Stop reason: HOSPADM

## 2017-12-21 RX ORDER — DIAZEPAM 5 MG
5-20 TABLET ORAL EVERY 30 MIN PRN
Status: DISCONTINUED | OUTPATIENT
Start: 2017-12-21 | End: 2017-12-21

## 2017-12-21 RX ORDER — TAMSULOSIN HYDROCHLORIDE 0.4 MG/1
0.4 CAPSULE ORAL DAILY
Status: DISCONTINUED | OUTPATIENT
Start: 2017-12-21 | End: 2017-12-21 | Stop reason: HOSPADM

## 2017-12-21 RX ORDER — TRAZODONE HYDROCHLORIDE 100 MG/1
200 TABLET ORAL AT BEDTIME
Status: DISCONTINUED | OUTPATIENT
Start: 2017-12-21 | End: 2017-12-21 | Stop reason: HOSPADM

## 2017-12-21 RX ORDER — HYDROXYZINE HYDROCHLORIDE 25 MG/1
25-50 TABLET, FILM COATED ORAL EVERY 4 HOURS PRN
Status: DISCONTINUED | OUTPATIENT
Start: 2017-12-21 | End: 2017-12-21 | Stop reason: HOSPADM

## 2017-12-21 RX ORDER — ALBUTEROL SULFATE 90 UG/1
2 AEROSOL, METERED RESPIRATORY (INHALATION) EVERY 6 HOURS
Status: DISCONTINUED | OUTPATIENT
Start: 2017-12-21 | End: 2017-12-21 | Stop reason: HOSPADM

## 2017-12-21 RX ORDER — ATENOLOL 50 MG/1
50 TABLET ORAL DAILY PRN
Status: DISCONTINUED | OUTPATIENT
Start: 2017-12-21 | End: 2017-12-21 | Stop reason: HOSPADM

## 2017-12-21 RX ORDER — HYDROXYZINE HYDROCHLORIDE 25 MG/1
50 TABLET, FILM COATED ORAL EVERY 4 HOURS PRN
Status: DISCONTINUED | OUTPATIENT
Start: 2017-12-21 | End: 2017-12-21 | Stop reason: HOSPADM

## 2017-12-21 RX ORDER — OLANZAPINE 5 MG/1
5-10 TABLET ORAL
Status: DISCONTINUED | OUTPATIENT
Start: 2017-12-21 | End: 2017-12-21 | Stop reason: HOSPADM

## 2017-12-21 RX ORDER — SPIRONOLACTONE AND HYDROCHLOROTHIAZIDE 25; 25 MG/1; MG/1
1 TABLET ORAL DAILY
Status: DISCONTINUED | OUTPATIENT
Start: 2017-12-21 | End: 2017-12-21 | Stop reason: HOSPADM

## 2017-12-21 RX ORDER — ACETAMINOPHEN 325 MG/1
650 TABLET ORAL EVERY 4 HOURS PRN
Status: DISCONTINUED | OUTPATIENT
Start: 2017-12-21 | End: 2017-12-21 | Stop reason: HOSPADM

## 2017-12-21 RX ORDER — TRAZODONE HYDROCHLORIDE 100 MG/1
100 TABLET ORAL AT BEDTIME
Status: DISCONTINUED | OUTPATIENT
Start: 2017-12-21 | End: 2017-12-21 | Stop reason: HOSPADM

## 2017-12-21 RX ORDER — ATORVASTATIN CALCIUM 10 MG/1
10 TABLET, FILM COATED ORAL AT BEDTIME
Status: DISCONTINUED | OUTPATIENT
Start: 2017-12-21 | End: 2017-12-21 | Stop reason: HOSPADM

## 2017-12-21 RX ADMIN — TRAZODONE HYDROCHLORIDE 100 MG: 100 TABLET ORAL at 01:30

## 2017-12-21 RX ADMIN — DIAZEPAM 10 MG: 5 TABLET ORAL at 07:58

## 2017-12-21 RX ADMIN — MIRTAZAPINE 15 MG: 15 TABLET, FILM COATED ORAL at 01:30

## 2017-12-21 RX ADMIN — DULOXETINE HYDROCHLORIDE 60 MG: 60 CAPSULE, DELAYED RELEASE ORAL at 11:53

## 2017-12-21 RX ADMIN — ATORVASTATIN CALCIUM 10 MG: 10 TABLET, FILM COATED ORAL at 14:46

## 2017-12-21 RX ADMIN — DULOXETINE HYDROCHLORIDE 60 MG: 60 CAPSULE, DELAYED RELEASE ORAL at 01:32

## 2017-12-21 RX ADMIN — NICOTINE 1 PATCH: 21 PATCH, EXTENDED RELEASE TRANSDERMAL at 07:51

## 2017-12-21 RX ADMIN — TAMSULOSIN HYDROCHLORIDE 0.4 MG: 0.4 CAPSULE ORAL at 14:24

## 2017-12-21 RX ADMIN — HYDROXYZINE HYDROCHLORIDE 50 MG: 25 TABLET ORAL at 04:18

## 2017-12-21 ASSESSMENT — ACTIVITIES OF DAILY LIVING (ADL)
COGNITION: 0 - NO COGNITION ISSUES REPORTED
AMBULATION: 0-->INDEPENDENT
RETIRED_EATING: 0-->INDEPENDENT
RETIRED_COMMUNICATION: 0-->UNDERSTANDS/COMMUNICATES WITHOUT DIFFICULTY
TOILETING: 0-->INDEPENDENT
BATHING: 0-->INDEPENDENT
DRESS: 0-->INDEPENDENT
SWALLOWING: 0-->SWALLOWS FOODS/LIQUIDS WITHOUT DIFFICULTY
TRANSFERRING: 0-->INDEPENDENT
FALL_HISTORY_WITHIN_LAST_SIX_MONTHS: NO

## 2017-12-21 NOTE — DISCHARGE INSTRUCTIONS
Behavioral Discharge Planning and Instructions      Summary:  You were admitted on 12/21/2017  due to making a suicide gesture when you were intoxicated and the police were checking on you.  You were treated by Dr. Jeff Brito MD. You asked to leave and were assessed as not being a danger to yourself.  You were  discharged on 12/21/2017 from [facility] to Home via cab at your own expense.      Principal Diagnosis:   Major Depression  Alcohol Use Disorder      Health Care Follow-up Appointments:     You are on Medicare and a supplement. You signed the required paperwork at the time of discharge.    You declined any referral to substance use treatment. You do plan on attending Alcoholics Anonymous.    Attend your new patient psychiatric medication management on Thursday, January 11, 2017 at 10AM, come one hour earlier for paperwork. You and the [facility] talked to [facility] about a therapist referral but they want you to see the psychiatric first so he can refer you to the best fit.  [facility]  [address] [place] [id number]  O: [phone]  The Health Unit Coordinator has faxed these discharge instructions to Fax: [phone]      You reported that you were just at your primary care doctor and do not need an appointment at this time.  12/14/2017 Office Visit [facility]    [address]    [address]    [place] [address] [phone]   Talon Elias MD    [address]    [address]    [place] [address] [phone] (Fax)         Attend all scheduled appointments with your outpatient providers. Call at least 24 hours in advance if you need to reschedule an appointment to ensure continued access to your outpatient providers.   Major Treatments, Procedures and Findings:  You were provided with: a psychiatric assessment, assessed for medical stability and medication evaluation and/or management    Drug screen is   negative.            Alcohol Breath Test 0.102 (A)          HTN: PTA on spironolactone-HCTZ. BPs elevated in the setting of not getting anti-hypertensive meds. Good compliance and control PTA.   - Resume PTA meds with hold parameters      COPD: Stable. PTA on albuterol and Breo Ellipta, continue      BPH: Stable. PTA on Flomax. Continue      HLD: PTA on statin, continue     JANIS: Uses CPAP, did not bring with him, does not want to use while admitted right now      Symptoms to Report: mood getting worse or thoughts of suicide    Early warning signs can include: increased thoughts or behaviors of suicide or self-harm     Safety and Wellness:  Take all medicines as directed.  Make no changes unless your doctor suggests them.      Follow treatment recommendations.  Refrain from alcohol and non-prescribed drugs.  If there is a concern for safety, call 911.    Resources:   [facility] Crisis (COPE) Response - Adult [phone]    You are going to AA.  http://a[place].org/    The treatment team has appreciated the opportunity to work with you.     If you have any questions or concerns our unit number is [phone].  You may be receiving a follow-up phone call within the next three days from a representative from behavioral health.    You have identified the best phone number to reach you as [phone]

## 2017-12-21 NOTE — ED NOTES
"Patient presents with [facility].  Patient ambulatory in from garage in handcuffs that were placed by PD.  Patient was contacted after 911 hang up, patient states he had been depressed lately, started drinking again, and wanted to \"stab myself\".  Patient walked towards large kitchen knife in front of PD which is why he was handcuffed per EMS.  Patient reports that he is changing psychiatrists because \"I was good, I was not drinking and was doing good and I missed like 4 appointments\".  Patient states he has been taking his medications regularly, states he has a hx of \"trying to hurt myself by taking pills the doctor prescribed me, but that doesn't work\".  Patient states he has been stressed and depressed recently because of holiday cards and needing to go to the bank and get more money because I don't know what else to get people. Patient states he is currently anxious, which he states is part of his depression. Patient states he would like something for his anxiety. Patient states he \"didn't drink that much\", states he drinks at least 3 times per week.     "

## 2017-12-21 NOTE — ED NOTES
Patient given sandwich and juice. Patient ambulatory back to room, patient calm and cooperative at this time.

## 2017-12-21 NOTE — PROGRESS NOTES
S: Patient admitted to [facility] on a 72 hour hold. Patient was a direct admission from [facility]. Pt was working on holiday wrapping at home and became increasingly depressed and suicidal. Pt called 911 and hung up so police went to his home to check on him. When they arrived, the patient grabbed a knife impulsively and made suicidal statements so he was brought to [facility] and placed on a 72 hour hold.    B: history of major depression, anxiety disorder, chronic ETOH abuse, COPD, HTN. Pt uses a CPAP at home. UTOX negative. One prior suicide attempt via overdose on medications a few years ago. Pt denies previous  hospitalizations but has been through both outpatient and residential CD treatment.     A: Pt calm and cooperative during belongings search and admission profile interview. Patient's bp elevated on arrival; (171/76), other vitals signs WNL. Pt denies SI/SIB/HI/AVH's on the unit. Patient is hoping he will not have to stay for the entirety of the 72 hour hold. MSSA score a 1 at 03:45. Pt given hydroxyzine 50mg for anxiety.     R: status 15, code 1, monitor for withdrawal utilizing MSSA with valium, internal med consult and labs ordered, establish rapport and provide for safety, nursing to continue to monitor

## 2017-12-21 NOTE — IP AVS SNAPSHOT
30    [address]    [place] [address]    Phone:  [phone]                                       After Visit Summary   12/21/2017    Jim Richard    MRN: [id number]           After Visit Summary Signature Page     I have received my discharge instructions, and my questions have been answered. I have discussed any challenges I see with this plan with the nurse or doctor.    ..........................................................................................................................................  Patient/Patient Representative Signature      ..........................................................................................................................................  Patient Representative Print Name and Relationship to Patient    ..................................................               ................................................  Date                                            Time    ..........................................................................................................................................  Reviewed by Signature/Title    ...................................................              ..............................................  Date                                                            Time

## 2017-12-21 NOTE — ED PROVIDER NOTES
"  History     Chief Complaint:  Suicidal (ETOH, suicidal statements, wanting to \"stab myself\" with kitchen knife. )    The history is provided by the patient and the EMS personnel.      Jim Richard is a 62 year old male with a history of depression and alcoholism who presents to the emergency department for evaluation of suicidal ideation. The patient reports that he was drinking alcohol today, and began feeling very depressed to the point that he thought he might harm himself. He called 911 and mentioned something about wanting to stab himself; however he hung up the phone, and police came to his house to ensure the patient was OK. When the police was at his house, the patient reached for a large knife that was on the counter. His blood alcohol level was measured at 0.21 and they decided to present him to the emergency department for further treatment. The patient currently states that he will not harm himself, and wishes to return home to be with his family for the holidays. He appears very anxious and continues to pace the hallway. He states that he has attempted to kill himself by overdosing on his prescription medications in the past. Upon reviewing his history, it was found that he has also attempted suicide by  in the past. Of note, the patient did not show up to his last four psychiatric appointments and states he is trying to find a new psychiatrist.     Allergies:  Allergies reviewed. No pertinent allergies.     Medications:    Cymbalta  Remeron  Antabuse  Campral  Meloxicam  Atorvastatin  Aldactazide  Quetiapine  Gabapentin  Trazodone  Effexor-XR  Hydroxyzine    Past Medical History:    Alcoholism   Anxiety   Coronary artery disease   Depression   Heart attack   Hepatomegaly   Hyperlipemia   Hypertension   Peripheral vascular disease    PVD (peripheral vascular disease)   Sleep apnea   Spinal stenosis    Past Surgical History:    Appendectomy  Femoropopliteal bypass " "graft  Colonoscopy  Endarterectomy femoral   Laminectomy, lumbar fusion 3+ level, combined  Tonsillectomy and adenoidectomy     Family History:    MENTAL ILLNESS Son   Substance Abuse Father   Substance Abuse Brother     Social History:  Smoking Status: Currently smokes 1 pack/day  Smokeless Tobacco: Never Used  Alcohol Use: Yes  Marital Status: Single    Review of Systems   Constitutional:        Alcohol intoxication   Psychiatric/Behavioral: Positive for suicidal ideas. Negative for self-injury. The patient is nervous/anxious.    10 point review of systems performed and is negative except as above and in HPI.    Physical Exam     Patient Vitals for the past 24 hrs:   BP Temp Temp src Heart Rate Resp SpO2 Height Weight   12/20/17 1950 166/90 98  F (36.7  C) Oral 77 19 95 % 1.702 m (5' 7\") 93 kg (205 lb)     Physical Exam  General: Siting in chair, appears comfortable. Poorly groomed.  Head:  The scalp, face, and head appear normal  Mouth/Throat: Mucus membranes are moist.  CV:  Regular rate    Normal S1 and S2  No pathological murmur   Resp:  Breath sounds clear and equal bilaterally    Non-labored, no retractions or accessory muscle use    No coarseness    No wheezing   GI:  Abdomen is soft, no rigidity    No tenderness to palpation  MS:  No evidence of self injury, no lacerations.  No evidence of trauma.  Skin:   No lacerations  Neuro:  Speech is normal.     No apparent focal deficit.      Uses all extremities equally.  Psych:  Awake. Alert.  Anxious affect.       No thoughts of harming others.    Denies hallucinations. Does not appear to be responding to internal stimuli.    Currently denies suicidal or homicidal thoughts but is evasive in his answers   and frequently does not answer the question asked.  Emergency Department Course     Interventions:  2117 Seroquel 50 mg PO  2117 Atarax 50 mg PO  0130 Remeron 15 mg PO  0130 Desyrel 100 mg PO  0132 Cymbalta 60 mg PO    Emergency Department Course:    Nursing " notes and vitals reviewed.    I performed an exam of the patient as documented above.     0014 I spoke with the DEC  about the patient's plan of care after she had a conversation with the patient.    The patient had a bedside alcohol breath test done.     I personally reviewed the breath test results with the patient and answered all related questions prior to admission.    Impression & Plan      Medical Decision Making:  Jim Richard is a 62 year old male who presents for evaluation of suicidal ideation.  They have a history of previous psychiatric illness and at this point appear decompensated psychiatrically.  A 72 hour hold was placed and security watch initiated given the patient's alcohol intake and the unstable nature of his presentation.   The patient has had increasing depression/thoughts of self harm over the past few days as well as alcohol intake.  They are currently a risk to themselves.  Although the patient has had increased suicidal thoughts, they have not had any actions of self harms.  They no signs at this point of suicide attempt or ingestion.    Plan will be admission to inpatient psychiatric care, location pending bed availability.  Paperwork filled out.      The patient was seen by DEC who agrees with this assessment.  The patient was given resources and will follow up as instructed.    Diagnosis:    ICD-10-CM    1. Suicidal thoughts R45.851      Disposition:   The patient will be admitted to psychiatric care, location pending bed availability.    Scribe Disclosure:  AMBROSIO, Lisa Woodward, am serving as a scribe at 8:48 PM on 12/20/2017 to document services personally performed by Nae Sheridan MD based on my observations and the provider's statements to me.     EMERGENCY DEPARTMENT       Nae Sheridan MD  03/02/18 4723

## 2017-12-21 NOTE — ED NOTES
Patient up to window complaining of dinging in room, monitor turned off and patient went back to room.

## 2017-12-21 NOTE — PLAN OF CARE
Problem: Patient Care Overview  Goal: Team Discussion  Team Plan:   Outcome: Adequate for Discharge Date Met: 12/21/17  BEHAVIORAL TEAM DISCUSSION    Participants:   Dr. Brito, Patricia Rowe, Karishma Starkey [facility]    Progress:   Pt was admitted from home via ambulance after the police did a welfare check and found the pt intoxicated and pulling out a knife.  Pt is on a 72 hour hold.     Drug screen is  negative.          Alcohol Breath Test 0.102 (A)       Pt is assessed as not being suicidal.    Continued Stay Criteria/Rationale:   Pt is discharging at his request. Pt does meet hold criteria.    Medical/Physical:   Coronary Artery Disease with a previous MI.  problem with his back, had a Spinal stenosis in the past and the back pain is back again.  COPD       Precautions:   Behavioral Orders   Procedures     Code 1 - Restrict to Unit     Routine Programming     As clinically indicated     Status 15     Every 15 minutes.     Withdrawal precautions     Plan:  monitor for withdrawal  Discharge home if not in acute physical distress  Pt has psychiatry and AA for follow up.    Rationale for change in precautions or plan:   Initial presentation

## 2017-12-21 NOTE — IP AVS SNAPSHOT
MRN:[id number]                      After Visit Summary   12/21/2017    Jim Richard    MRN: [id number]           Thank you!     Thank you for choosing [facility] for your care. Our goal is always to provide you with excellent care.        Patient Information     Date Of Birth          1955        Designated Caregiver       Most Recent Value    Caregiver    Will someone help with your care after discharge? no      About your hospital stay     You were admitted on:  December 21, 2017 You last received care in the:  [facility] 30NR    You were discharged on:  December 21, 2017       Who to Call     For medical emergencies, please call [phone].  For non-urgent questions about your medical care, please call your primary care provider or clinic, [phone]          Attending Provider     Provider Jeff Hale MD Psychiatry       Primary Care Provider Office Phone # Fax #    Vilma Romo [facility] [phone]      Follow-up Appointments     Follow Up ([facility])       Follow up with primary care provider, Vilma Romo [facility], within 7 days for hospital follow- up.  The following labs/tests are recommended: CBC, LFTs in the setting of anemia and chronic AST/ALP elevation.      Appointments on [place] and/or [facility] (with [facility] or [facility] provider or service). Call [phone] if you haven't heard regarding these appointments within 7 days of discharge.                  Further instructions from your care team         Behavioral Discharge Planning and Instructions      Summary:  You were admitted on 12/21/2017  due to making a suicide gesture when you were intoxicated and the police were checking on you.  You were treated by Dr. Jeff Brito MD. You asked to leave and were assessed as not being a danger to yourself.  You were  discharged on 12/21/2017 from [facility] to Home via cab at your own expense.      Principal Diagnosis:   Major  Depression  Alcohol Use Disorder      Health Care Follow-up Appointments:     You are on Medicare and a supplement. You signed the required paperwork at the time of discharge.    You declined any referral to substance use treatment. You do plan on attending Alcoholics Anonymous.    Attend your new patient psychiatric medication management on Thursday, January 11, 2017 at 10AM, come one hour earlier for paperwork. You and the [facility] talked to [facility] about a therapist referral but they want you to see the psychiatric first so he can refer you to the best fit.  [facility]  [address] [address]  O: [phone]  The Health Unit Coordinator has faxed these discharge instructions to Fax: [phone]      You reported that you were just at your primary care doctor and do not need an appointment at this time.  12/14/2017 Office Visit [facility]    [address]    [facility] [place] [id number] [phone]   Talon Elias MD    [address]    [place], [place] [id number] [phone] (Fax)         Attend all scheduled appointments with your outpatient providers. Call at least 24 hours in advance if you need to reschedule an appointment to ensure continued access to your outpatient providers.   Major Treatments, Procedures and Findings:  You were provided with: a psychiatric assessment, assessed for medical stability and medication evaluation and/or management    Drug screen is  negative.            Alcohol Breath Test 0.102 (A)          HTN: PTA on spironolactone-HCTZ. BPs elevated in the setting of not getting anti-hypertensive meds. Good compliance and control PTA.   - Resume PTA meds with hold parameters      COPD: Stable. PTA on albuterol and Breo Ellipta, continue      BPH: Stable. PTA on Flomax. Continue      HLD: PTA on statin, continue     JANIS: Uses CPAP, did not bring with him, does not want to use  "while admitted right now      Symptoms to Report: mood getting worse or thoughts of suicide    Early warning signs can include: increased thoughts or behaviors of suicide or self-harm     Safety and Wellness:  Take all medicines as directed.  Make no changes unless your doctor suggests them.      Follow treatment recommendations.  Refrain from alcohol and non-prescribed drugs.  If there is a concern for safety, call 911.    Resources:   [facility] (COPE) Response - Adult [phone]    You are going to AA.  [email or web address]    The treatment team has appreciated the opportunity to work with you.     If you have any questions or concerns our unit number is [phone].  You may be receiving a follow-up phone call within the next three days from a representative from behavioral health.    You have identified the best phone number to reach you as [phone]          Pending Results     No orders found from 2017 to 2017.            Admission Information     Date & Time Department Dept. Phone    2017 UR 30NR [phone]      Your Vitals Were     Blood Pressure Pulse Temperature Respirations Height Weight    185/83 66 95.8  F (35.4  C) 16 1.702 m (5' 7\") 94.3 kg (208 lb)    BMI (Body Mass Index)                   32.58 kg/m2           [employer] Information     [employer] lets you send messages to your doctor, view your test results, renew your prescriptions, schedule appointments and more. To sign up, go to [email or web address] . Click on \"Log in\" on the left side of the screen, which will take you to the Welcome page. Then click on \"Sign up Now\" on the right side of the page.     You will be asked to enter the access code listed below, as well as some personal information. Please follow the directions to create your username and password.     Your access code is: [email or web address]  Expires: 3/21/2018  2:35 PM     Your access code will  in 90 days. If you need help or a new code, " please call your [facility] clinic or [phone].        Care EveryWhere ID     This is your Care EveryWhere ID. This could be used by other organizations to access your [facility] medical records  [id number]        Equal Access to Services     AGUSTÍN PLUNKETT : Hadii aad ku hadmichaelion Luz, waaxda luqadaha, qaybta kaalmada jessica, stan bakariin hayaaselvin archibald lexiefelisa azul. So [facility] [phone].    ATENCIÓN: Si habla español, tiene a thompson disposición servicios gratuitos de asistencia lingüística. Llame al [phone].    We comply with applicable federal civil rights laws and [place] laws. We do not discriminate on the basis of race, color, national origin, age, disability, sex, sexual orientation, or gender identity.               Review of your medicines      CONTINUE these medicines which have NOT CHANGED        Dose / Directions    albuterol 108 (90 BASE) MCG/ACT Inhaler   Commonly known as:  PROAIR HFA/PROVENTIL HFA/VENTOLIN HFA        Dose:  2 puff   Inhale 2 puffs into the lungs every 6 hours   Refills:  0       ATORVASTATIN CALCIUM PO        Dose:  10 mg   Take 10 mg by mouth At Bedtime   Refills:  0       BREO ELLIPTA 200-25 MCG/INH oral inhaler   Generic drug:  fluticasone-vilanterol        Dose:  1 puff   Inhale 1 puff into the lungs daily   Refills:  0       DULoxetine 60 MG EC capsule   Commonly known as:  CYMBALTA   Used for:  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)        Dose:  60 mg   Take 1 capsule (60 mg) by mouth daily   Quantity:  30 capsule   Refills:  0       HYDROXYZINE HCL PO        Dose:  50 mg   Take 50 mg by mouth 3 times daily as needed for other (anxiety)   Refills:  0       mirtazapine 15 MG tablet   Commonly known as:  REMERON   Used for:  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)        Dose:  15 mg   Take 1 tablet (15 mg) by mouth At Bedtime   Quantity:  30 tablet   Refills:  0       QUETIAPINE FUMARATE PO        Dose:  50 mg   Take  50 mg by mouth 3 times daily as needed   Refills:  0       spironolactone-hydrochlorothiazide 25-25 MG per tablet   Commonly known as:  ALDACTAZIDE        Dose:  1 tablet   Take 1 tablet by mouth daily   Refills:  0       TAMSULOSIN HCL PO        Dose:  0.4 mg   Take 0.4 mg by mouth daily   Refills:  0       TRAZODONE HCL PO        Dose:  200 mg   Take 200 mg by mouth At Bedtime   Refills:  0                Protect others around you: Learn how to safely use, store and throw away your medicines at www.disposemymeds.org.             Medication List: This is a list of all your medications and when to take them. Check marks below indicate your daily home schedule. Keep this list as a reference.      Medications           Morning Afternoon Evening Bedtime As Needed    albuterol 108 (90 BASE) MCG/ACT Inhaler   Commonly known as:  PROAIR HFA/PROVENTIL HFA/VENTOLIN HFA   Inhale 2 puffs into the lungs every 6 hours                                ATORVASTATIN CALCIUM PO   Take 10 mg by mouth At Bedtime   Last time this was given:  10 mg on 12/21/2017  2:46 PM                                BREO ELLIPTA 200-25 MCG/INH oral inhaler   Inhale 1 puff into the lungs daily   Generic drug:  fluticasone-vilanterol                                DULoxetine 60 MG EC capsule   Commonly known as:  CYMBALTA   Take 1 capsule (60 mg) by mouth daily   Last time this was given:  60 mg on 12/21/2017 11:53 AM                                HYDROXYZINE HCL PO   Take 50 mg by mouth 3 times daily as needed for other (anxiety)   Last time this was given:  50 mg on 12/21/2017  4:18 AM                                mirtazapine 15 MG tablet   Commonly known as:  REMERON   Take 1 tablet (15 mg) by mouth At Bedtime                                QUETIAPINE FUMARATE PO   Take 50 mg by mouth 3 times daily as needed                                spironolactone-hydrochlorothiazide 25-25 MG per tablet   Commonly known as:  ALDACTAZIDE   Take 1 tablet by  mouth daily                                TAMSULOSIN HCL PO   Take 0.4 mg by mouth daily   Last time this was given:  0.4 mg on 12/21/2017  2:24 PM                                TRAZODONE HCL PO   Take 200 mg by mouth At Bedtime
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
## 2017-12-21 NOTE — ED NOTES
Bed: [facility]  Expected date:   Expected time:   Means of arrival:   Comments:  432 62m suicida/etoh
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
## 2017-12-21 NOTE — ED NOTES
"Patient pounded on window and states he can not breathe. Patient's SPO2 was assessed, patient states he feels \"maybe it was a panic attack\". Patient was assured that the doctor had just prescribed medications.    "

## 2017-12-21 NOTE — PROGRESS NOTES
12/21/17 0405   Patient Belongings   Did you bring any home meds/supplements to the hospital?  No   Patient Belongings cell phone/electronics;shoes;wallet   Belongings Search Yes   Clothing Search Yes   Second Staff Ainsley CARUSO   General Info Comment Envelope # [phone] sent to security     Items kept in storage  Shoes, Wallet, Cell phone, Belt    Items sent to security  [place] Drivers License, [place] emily visa, Visa, $6.00 ( six dollars).    Admission Signature    Patient Signature      ___________________    Staff Signature      __________________    Discharge Signature    All my personal items were returned to me    Patient Signature      _________________    Staff Signature      _________________

## 2017-12-21 NOTE — PROGRESS NOTES
Behavioral Discharge Planning and Instructions      Summary:  You were admitted on 12/21/2017  due to making a suicide gesture when you were intoxicated and the police were checking on you.  You were treated by Dr. Jeff Brito MD. You asked to leave and were assessed as not being a danger to yourself.  You were  discharged on 12/21/2017 from [facility] to Home via cab at your own expense.      Principal Diagnosis:   Major Depression  Alcohol Use Disorder      Health Care Follow-up Appointments:     You are on Medicare and a supplement. You signed the required paperwork at the time of discharge.    You declined any referral to substance use treatment. You do plan on attending Alcoholics Anonymous.    Attend your new patient psychiatric medication management on Thursday, January 11, 2017 at 10AM, come one hour earlier for paperwork. You and the [facility] talked to [facility] about a therapist referral but they want you to see the psychiatric first so he can refer you to the best fit.  [facility]  [address] [place] [address]  O: [phone]  The Health Unit Coordinator has faxed these discharge instructions to Fax: [phone]      You reported that you were just at your primary care doctor and do not need an appointment at this time.  12/14/2017 Office Visit [facility]    [address]    [address]    [place] [address] [phone]   Talon Elias MD    [address]    [address]    [place] [id number] [phone] (Fax)         Attend all scheduled appointments with your outpatient providers. Call at least 24 hours in advance if you need to reschedule an appointment to ensure continued access to your outpatient providers.   Major Treatments, Procedures and Findings:  You were provided with: a psychiatric assessment, assessed for medical stability and medication evaluation and/or management    Drug screen is   negative.            Alcohol Breath Test 0.102 (A)          HTN: PTA on spironolactone-HCTZ. BPs elevated in the setting of not getting anti-hypertensive meds. Good compliance and control PTA.   - Resume PTA meds with hold parameters      COPD: Stable. PTA on albuterol and Breo Ellipta, continue      BPH: Stable. PTA on Flomax. Continue      HLD: PTA on statin, continue     JANIS: Uses CPAP, did not bring with him, does not want to use while admitted right now      Symptoms to Report: mood getting worse or thoughts of suicide    Early warning signs can include: increased thoughts or behaviors of suicide or self-harm     Safety and Wellness:  Take all medicines as directed.  Make no changes unless your doctor suggests them.      Follow treatment recommendations.  Refrain from alcohol and non-prescribed drugs.  If there is a concern for safety, call 911.    Resources:   [facility] Crisis (COPE) Response - Adult [phone]    You are going to AA.  [email or web address]    The treatment team has appreciated the opportunity to work with you.     If you have any questions or concerns our unit number is [phone].  You may be receiving a follow-up phone call within the next three days from a representative from behavioral health.    You have identified the best phone number to reach you as [phone]

## 2017-12-21 NOTE — PLAN OF CARE
Problem: Patient Care Overview  Goal: Plan of Care/Patient Progress Review  Outcome: Adequate for Discharge Date Met: 12/21/17  Patient wants to go home , continues with tremors looking uncomfortable but stating feeling safe for discharge . He denies feeling suicidal and states feeling safe for discharge. Will be discharged today with a discharge plan in place.

## 2017-12-21 NOTE — ED PROVIDER NOTES
Assumed care during my shift.  Admit arranged at [place] psych [facility], service of Dr. Brito.       Tone Pizarro MD  12/21/17 0454

## 2017-12-21 NOTE — PHARMACY-ADMISSION MEDICATION HISTORY
Admission medication history interview status for the 12/20/2017  admission is complete. See EPIC admission navigator for prior to admission medications     Medication history source reliability:Good    Actions taken by pharmacist (provider contacted, etc): Called [facility] (in [place]) to see which medications patient has recently picked up. All medications on list have been picked up in the past 3 months.   Patient is unable to assess due to current mental status, so last doses are unknown.       Additional medication history information not noted on PTA med list :    1. Medications added per [facility]:   Tamsulosin (picked up 12/12/17)  Breo Ellipta (picked up 10/27/17)  Ventolin (picked up 10/27/17).    2. Medications deleted per [facility]:   Acamprosate (last picked up in April 2017)  Disulfiram (last picked up in July 2017)  Gabapentin (last picked up in March 2017)  Venlafaxine (last picked up in February 2017)  Ibuprofen - no history of fills  Meloxicam - no history of fills  Multivitamin - no history of fills  Fish oil - no history of fills    Medication reconciliation/reorder completed by provider prior to medication history? No    Time spent in this activity: 25 minutes    Prior to Admission medications    Medication Sig Last Dose Taking? Auth Provider   TAMSULOSIN HCL PO Take 0.4 mg by mouth daily  Yes Unknown, Entered By History   fluticasone-vilanterol (BREO ELLIPTA) 200-25 MCG/INH oral inhaler Inhale 1 puff into the lungs daily  Yes Unknown, Entered By History   albuterol (PROAIR HFA/PROVENTIL HFA/VENTOLIN HFA) 108 (90 BASE) MCG/ACT Inhaler Inhale 2 puffs into the lungs every 6 hours  Yes Unknown, Entered By History   DULoxetine (CYMBALTA) 60 MG EC capsule Take 1 capsule (60 mg) by mouth daily  Yes Brody Rand MD   mirtazapine (REMERON) 15 MG tablet Take 1 tablet (15 mg) by mouth At Bedtime  Yes Brody Rand MD   ATORVASTATIN CALCIUM PO Take 10 mg by mouth At Bedtime   Yes Unknown,  Entered By History   spironolactone-hydrochlorothiazide (ALDACTAZIDE) 25-25 MG per tablet Take 1 tablet by mouth daily  Yes Unknown, Entered By History   QUETIAPINE FUMARATE PO Take 50 mg by mouth 3 times daily as needed  Yes Unknown, Entered By History   TRAZODONE HCL PO Take 200 mg by mouth At Bedtime   Yes Unknown, Entered By History   HYDROXYZINE HCL PO Take 50 mg by mouth 3 times daily as needed for other (anxiety)   Yes Reported, Patient

## 2017-12-22 NOTE — DISCHARGE SUMMARY
"COMBINATION DISCHARGE SUMMARY/ADMISSION NOTE       DATE OF ASSESSMENT:  12/21/2017.      IDENTIFYING INFORMATION:  Jim Richard is a 62-year-old single  male residing independently.      CHIEF COMPLAINT:  \"I had one too many drinks, I really have a lot of good things planned out for this weekend with my family.\"      HISTORY OF PRESENT ILLNESS:  The patient has a history of major depressive disorder and alcohol dependence who presented to an outside emergency room in a state of emotional dysregulation in the context of alcohol usage with initial BAL of 0.102.  He had reported experiencing heightened anxiety and recent worsening of depressed mood and experiencing suicidal thoughts, specifically had reported a desire of wanting to stab himself with a knife.  He admitted to having a few cocktails that evening and appeared to be intoxicated.  At some point he had called his outside supports including his sponsor who encouraged him to call 911.  The patient called 911 reporting how he was feeling, which prompted his admission to the emergency room and eventually to our behavioral health unit.  On examination today, the patient explains that on the evening of his arrival, he was wrapping [employer] gifts and was having a few cocktails.  At some point he ran out of gift wrapping paper and was too intoxicated to drive and did not have money to purchase a taxi ride or wrapping paper.  He became quite upset, anxious as he has been planning for a nice holiday weekend with his son and ex-wife.  He tells me that his son is traveling from out of state along with his significant other to spend time with the family and he has been very much looking forward to this event.  In anticipation of having gifts ready when he was faced with a situation, prompting him to believe that he could not be ready in time, he became emotionally overwhelmed, in the setting of alcohol usage emotions were intensified and he expressed " suicidal thoughts.  Today he harbors no suicidal plan, intent or desire.  He is embarrassed of the situation which prompted his arrival, he adamantly tells me that he does not drink alcohol every day and would like to be discharged home today.  He does admit that over the past 2 days he is not taking his hypertensive medications in the setting of alcohol usage.      PSYCHIATRIC REVIEW OF SYSTEMS:  Over the past 4 weeks.  The patient denied sustained depressed mood.  He attributed the recent depressive symptoms to alcohol intoxication.  He denied anhedonia.  He denied feeling helpless, hopeless.  He denied any difficulty with sleep, energy, appetite or concentration.  He adamantly denied suicidal and homicidal thoughts.  He denied symptoms of bear, psychosis, PTSD, panic disorder, MARIA FERNANDA, eating disorder and OCD.      PAST PSYCHIATRIC HISTORY:  The patient has a diagnosis of major depressive disorder with prior inpatient hospitalizations.  I have worked with the patient on a few occasions in the past, one notably in 2014.  His last inpatient hospitalization was in 02/2017.  He recalls 1 prior suicide attempt many years ago where he had overdosed on medications.  He was under the care of a psychiatrist for some time until he missed 4 appointments in a row and is now referred to a new psychiatrist, with an upcoming appointment in early January.  He tells me that a therapist has also been recommended to him; however, he has not made the initiative to schedule an appointment.  He is currently prescribed a combination of Cymbalta and mirtazapine for management of his mood and anxiety symptoms and finds this to be helpful.      SUBSTANCE ABUSE HISTORY:  Drug of choice is alcohol with pattern of usage corresponding to dependence.  He has been to detox and treatment in the past.  He tells me that he drinks alcohol 2-3 days out of the week, each time having 2-3 cocktails.  He denied any recent tolerance or withdrawal.  He  denied a history of DTs or seizures.  He denied illicit substance usage.      PAST MEDICAL HISTORY:   1.  COPD.    2.  Hypertension.      FAMILY HISTORY:  No reports of bipolar disorder or suicides in the family.      SOCIAL HISTORY:  Refer to the psychosocial assessment completed by our .  He reported having friends and family for social support and a strong community presence in .        MEDICAL REVIEW OF SYSTEMS:  A 10-point systems were reviewed and were positive for psychiatric symptoms as noted above, otherwise negative.      PHYSICAL EXAMINATION:   VITAL SIGNS:  Blood pressure is 185/83, temperature 98.5, pulse 66, respirations 16.  The rest of the physical examination was reviewed in the emergency room documentation.      MENTAL STATUS EXAMINATION:  The patient appears his stated age, appropriately dressed, fair hygiene, out in the common area accompanied me to the interview room.  Eye contact was fair.  Speech was normal.  Mood was reported as euthymic.  Affect was full and appropriate.  Thought process was linear, logical, future oriented.  Thought content did not display evidence of psychosis.  He denied suicidal and homicidal thoughts.  Insight and judgment appear fair.  Cognition appeared intact to interviewing including orientation to person, place, time and situation, use of language, fund of knowledge, recent and remote memory.  Muscle strength, tone and gait appear normal on visual inspection.      ASSESSMENT:   1.  Major depressive disorder, recurrent, moderate.   2.  Alcohol use disorder, severe.   3.  Hypertension.   4.  Chronic obstructive pulmonary disease.      PLAN:   1.  The patient has been admitted to our Behavioral Health Unit on station 30 under a 72-hour hold for reports of suicidal thoughts in the setting of alcohol intoxication.  At this time, the patient does not meet criteria to pursue involuntary commitment.  He is willing to continue taking his medications and  follow up with his outpatient providers.  His mood and affect appear appropriate today as he is no longer under the influence of alcohol, which was likely a contributing factor in the setting of a brief psychosocial stressor which has since resolved.  He is future oriented on examination.  There did not appear to be any overt withdrawal symptoms other than some elevation in his blood pressure; however, he tells me that he has not taken his antihypertensives in 2 days.  I will plan to restart his outpatient medications including a combination of Cymbalta and Remeron at his outpatient dose as he reports these to be effective when compliant.  The remainder of his medications will also be resumed for management of COPD and hypertension.  We will monitor for potential withdrawal symptoms throughout the day; however, if he is not experiencing any significant withdrawal symptoms by this afternoon, the patient is preferring to be discharged home.  We will plan to discharge this afternoon if that criteria is met.  He is not interested in pursuing hospitalization voluntarily.      CONDITION AT DISCHARGE:  Improved.  His acute suicide risk is low given the following factors:  Improved mood and anxiety, denies suicidal thoughts, denies psychotic symptoms, not actively intoxicated and plans to continue minimizing alcohol usage and avoid illicit substances.  He denies immediate access to firearms.  He denies feeling helpless or hopeless.  He is future oriented on exam.  His chronic risk for suicide is moderate given the following factors:  Past suicide attempt, white race, diagnosis of major depressive disorder and chemical addiction, unemployment.  Preventative factors include social supports, stable housing, family supports, children, [employer]regation beliefs.      DISPOSITION:  The patient will be discharged home per his request.      FOLLOWUP APPOINTMENTS:     1.  He has an appointment with a psychiatrist for an initial assessment  through [facility]   2.  Our  will help him arrange a followup appointment for psychotherapy.   3.  He will continue to engage in AA with frequent contact with his sponsor to help minimize alcohol usage and hopefully sobriety.  He is not interested in a chemical health assessment at this time or any formal treatment to address chemical addiction.      DISCHARGE MEDICATIONS:   1.  Tamsulosin 0.4 mg daily.    2.  Fluticasone/vilanterol 200/25 mcg inhaler 1 puff daily.   3.  Albuterol inhaler 2 puffs every 6 hours as needed for shortness of breath.    4.  Cymbalta 60 mg daily.   5.  Remeron 15 mg at bedtime.     6.  Atorvastatin 10 mg at bedtime.   7.  Spironolactone 25-25 mg tablets 1 tab daily.   8.  Quetiapine 50 mg t.i.d. p.r.n. severe anxiety.   9.  Trazodone 200 mg at bedtime.   10.  Hydroxyzine 50 mg t.i.d. p.r.n. for mild anxiety.      PERTINENT LABORATORY:  Urine drug screen was negative.         GERMAN MCCALLUM MD             D: 2017 11:13   T: 2017 12:44   MT: RICHARD      Name:     DEVONTE SEGURA   MRN:      [id number]        Account:        [id number]   :      1955           Admit Date:                                       Discharge Date: 2017      Document: [id number]       cc: [facility]

## 2018-03-21 ENCOUNTER — APPOINTMENT (OUTPATIENT)
Dept: GENERAL RADIOLOGY | Facility: CLINIC | Age: 63
DRG: 441 | End: 2018-03-21
Attending: EMERGENCY MEDICINE
Payer: MEDICARE

## 2018-03-21 ENCOUNTER — HOSPITAL ENCOUNTER (INPATIENT)
Facility: CLINIC | Age: 63
LOS: 3 days | Discharge: HOME | DRG: 441 | End: 2018-03-24
Attending: EMERGENCY MEDICINE | Admitting: INTERNAL MEDICINE
Payer: MEDICARE

## 2018-03-21 ENCOUNTER — APPOINTMENT (OUTPATIENT)
Dept: CT IMAGING | Facility: CLINIC | Age: 63
DRG: 441 | End: 2018-03-21
Attending: EMERGENCY MEDICINE
Payer: MEDICARE

## 2018-03-21 DIAGNOSIS — K92.0 HEMATEMESIS WITHOUT NAUSEA: ICD-10-CM

## 2018-03-21 DIAGNOSIS — F10.930 ALCOHOL WITHDRAWAL SYNDROME WITHOUT COMPLICATION (H): ICD-10-CM

## 2018-03-21 DIAGNOSIS — D62 ANEMIA DUE TO BLOOD LOSS, ACUTE: ICD-10-CM

## 2018-03-21 PROBLEM — K92.2 GI BLEED: Status: ACTIVE | Noted: 2018-03-21

## 2018-03-21 LAB
ABO + RH BLD: NORMAL
ABO + RH BLD: NORMAL
ALBUMIN SERPL-MCNC: 2.9 G/DL (ref 3.4–5)
ALP SERPL-CCNC: 172 U/L (ref 40–150)
ALT SERPL W P-5'-P-CCNC: 74 U/L (ref 0–70)
ANION GAP SERPL CALCULATED.3IONS-SCNC: 11 MMOL/L (ref 3–14)
AST SERPL W P-5'-P-CCNC: 136 U/L (ref 0–45)
BASOPHILS # BLD AUTO: 0 10E9/L (ref 0–0.2)
BASOPHILS NFR BLD AUTO: 0.2 %
BILIRUB SERPL-MCNC: 0.9 MG/DL (ref 0.2–1.3)
BLD GP AB SCN SERPL QL: NORMAL
BLD PROD TYP BPU: NORMAL
BLD PROD TYP BPU: NORMAL
BLD UNIT ID BPU: 0
BLOOD BANK CMNT PATIENT-IMP: NORMAL
BLOOD PRODUCT CODE: NORMAL
BPU ID: NORMAL
BUN SERPL-MCNC: 46 MG/DL (ref 7–30)
CALCIUM SERPL-MCNC: 7.7 MG/DL (ref 8.5–10.1)
CHLORIDE SERPL-SCNC: 94 MMOL/L (ref 94–109)
CO2 SERPL-SCNC: 25 MMOL/L (ref 20–32)
CREAT SERPL-MCNC: 0.96 MG/DL (ref 0.66–1.25)
DIFFERENTIAL METHOD BLD: ABNORMAL
EOSINOPHIL # BLD AUTO: 0 10E9/L (ref 0–0.7)
EOSINOPHIL NFR BLD AUTO: 0 %
ERYTHROCYTE [DISTWIDTH] IN BLOOD BY AUTOMATED COUNT: 19.5 % (ref 10–15)
ETHANOL SERPL-MCNC: <0.01 G/DL
GFR SERPL CREATININE-BSD FRML MDRD: 79 ML/MIN/1.7M2
GLUCOSE BLDC GLUCOMTR-MCNC: 153 MG/DL (ref 70–99)
GLUCOSE BLDC GLUCOMTR-MCNC: 157 MG/DL (ref 70–99)
GLUCOSE SERPL-MCNC: 155 MG/DL (ref 70–99)
HCT VFR BLD AUTO: 22.9 % (ref 40–53)
HGB BLD-MCNC: 7.4 G/DL (ref 13.3–17.7)
HGB BLD-MCNC: 7.8 G/DL (ref 13.3–17.7)
IMM GRANULOCYTES # BLD: 0 10E9/L (ref 0–0.4)
IMM GRANULOCYTES NFR BLD: 0.2 %
INR PPP: 1.31 (ref 0.86–1.14)
INTERPRETATION ECG - MUSE: NORMAL
LIPASE SERPL-CCNC: 316 U/L (ref 73–393)
LYMPHOCYTES # BLD AUTO: 0.4 10E9/L (ref 0.8–5.3)
LYMPHOCYTES NFR BLD AUTO: 7.4 %
MCH RBC QN AUTO: 25.2 PG (ref 26.5–33)
MCHC RBC AUTO-ENTMCNC: 32.3 G/DL (ref 31.5–36.5)
MCV RBC AUTO: 78 FL (ref 78–100)
MONOCYTES # BLD AUTO: 0.7 10E9/L (ref 0–1.3)
MONOCYTES NFR BLD AUTO: 12.4 %
NEUTROPHILS # BLD AUTO: 4.8 10E9/L (ref 1.6–8.3)
NEUTROPHILS NFR BLD AUTO: 79.8 %
NRBC # BLD AUTO: 0 10*3/UL
NRBC BLD AUTO-RTO: 0 /100
NUM BPU REQUESTED: 4
PLATELET # BLD AUTO: 102 10E9/L (ref 150–450)
POTASSIUM SERPL-SCNC: 3.9 MMOL/L (ref 3.4–5.3)
PROT SERPL-MCNC: 6 G/DL (ref 6.8–8.8)
RBC # BLD AUTO: 2.94 10E12/L (ref 4.4–5.9)
SODIUM SERPL-SCNC: 130 MMOL/L (ref 133–144)
SPECIMEN EXP DATE BLD: NORMAL
TRANSFUSION STATUS PATIENT QL: NORMAL
TRANSFUSION STATUS PATIENT QL: NORMAL
WBC # BLD AUTO: 6 10E9/L (ref 4–11)

## 2018-03-21 PROCEDURE — 85025 COMPLETE CBC W/AUTO DIFF WBC: CPT | Performed by: EMERGENCY MEDICINE

## 2018-03-21 PROCEDURE — 70450 CT HEAD/BRAIN W/O DYE: CPT

## 2018-03-21 PROCEDURE — 25000125 ZZHC RX 250: Performed by: EMERGENCY MEDICINE

## 2018-03-21 PROCEDURE — HZ2ZZZZ DETOXIFICATION SERVICES FOR SUBSTANCE ABUSE TREATMENT: ICD-10-PCS | Performed by: INTERNAL MEDICINE

## 2018-03-21 PROCEDURE — 86850 RBC ANTIBODY SCREEN: CPT | Performed by: EMERGENCY MEDICINE

## 2018-03-21 PROCEDURE — 94660 CPAP INITIATION&MGMT: CPT

## 2018-03-21 PROCEDURE — 25000128 H RX IP 250 OP 636: Performed by: INTERNAL MEDICINE

## 2018-03-21 PROCEDURE — 25000132 ZZH RX MED GY IP 250 OP 250 PS 637: Mod: GY | Performed by: INTERNAL MEDICINE

## 2018-03-21 PROCEDURE — 96376 TX/PRO/DX INJ SAME DRUG ADON: CPT

## 2018-03-21 PROCEDURE — 36430 TRANSFUSION BLD/BLD COMPNT: CPT

## 2018-03-21 PROCEDURE — 00000146 ZZHCL STATISTIC GLUCOSE BY METER IP

## 2018-03-21 PROCEDURE — 96366 THER/PROPH/DIAG IV INF ADDON: CPT

## 2018-03-21 PROCEDURE — 25000125 ZZHC RX 250: Performed by: INTERNAL MEDICINE

## 2018-03-21 PROCEDURE — 83690 ASSAY OF LIPASE: CPT | Performed by: EMERGENCY MEDICINE

## 2018-03-21 PROCEDURE — 20000003 ZZH R&B ICU

## 2018-03-21 PROCEDURE — 85018 HEMOGLOBIN: CPT | Performed by: INTERNAL MEDICINE

## 2018-03-21 PROCEDURE — 80320 DRUG SCREEN QUANTALCOHOLS: CPT | Performed by: EMERGENCY MEDICINE

## 2018-03-21 PROCEDURE — 96375 TX/PRO/DX INJ NEW DRUG ADDON: CPT

## 2018-03-21 PROCEDURE — A9270 NON-COVERED ITEM OR SERVICE: HCPCS | Mod: GY | Performed by: INTERNAL MEDICINE

## 2018-03-21 PROCEDURE — 86901 BLOOD TYPING SEROLOGIC RH(D): CPT | Performed by: EMERGENCY MEDICINE

## 2018-03-21 PROCEDURE — 80053 COMPREHEN METABOLIC PANEL: CPT | Performed by: EMERGENCY MEDICINE

## 2018-03-21 PROCEDURE — 71046 X-RAY EXAM CHEST 2 VIEWS: CPT

## 2018-03-21 PROCEDURE — 25000128 H RX IP 250 OP 636: Performed by: EMERGENCY MEDICINE

## 2018-03-21 PROCEDURE — 40000275 ZZH STATISTIC RCP TIME EA 10 MIN

## 2018-03-21 PROCEDURE — 99291 CRITICAL CARE FIRST HOUR: CPT | Performed by: INTERNAL MEDICINE

## 2018-03-21 PROCEDURE — 36415 COLL VENOUS BLD VENIPUNCTURE: CPT | Performed by: INTERNAL MEDICINE

## 2018-03-21 PROCEDURE — P9016 RBC LEUKOCYTES REDUCED: HCPCS | Performed by: EMERGENCY MEDICINE

## 2018-03-21 PROCEDURE — 86900 BLOOD TYPING SEROLOGIC ABO: CPT | Performed by: EMERGENCY MEDICINE

## 2018-03-21 PROCEDURE — 96360 HYDRATION IV INFUSION INIT: CPT | Mod: 59

## 2018-03-21 PROCEDURE — 99285 EMERGENCY DEPT VISIT HI MDM: CPT | Mod: 25

## 2018-03-21 PROCEDURE — 86923 COMPATIBILITY TEST ELECTRIC: CPT | Performed by: EMERGENCY MEDICINE

## 2018-03-21 PROCEDURE — 85610 PROTHROMBIN TIME: CPT | Performed by: EMERGENCY MEDICINE

## 2018-03-21 PROCEDURE — 25000128 H RX IP 250 OP 636

## 2018-03-21 PROCEDURE — 93005 ELECTROCARDIOGRAM TRACING: CPT

## 2018-03-21 PROCEDURE — 96365 THER/PROPH/DIAG IV INF INIT: CPT | Mod: 59

## 2018-03-21 PROCEDURE — 25000131 ZZH RX MED GY IP 250 OP 636 PS 637: Mod: GY | Performed by: EMERGENCY MEDICINE

## 2018-03-21 RX ORDER — POTASSIUM CHLORIDE 7.45 MG/ML
10 INJECTION INTRAVENOUS
Status: DISCONTINUED | OUTPATIENT
Start: 2018-03-21 | End: 2018-03-24 | Stop reason: HOSPADM

## 2018-03-21 RX ORDER — ALBUTEROL SULFATE 0.83 MG/ML
2.5 SOLUTION RESPIRATORY (INHALATION)
Status: DISCONTINUED | OUTPATIENT
Start: 2018-03-21 | End: 2018-03-24 | Stop reason: HOSPADM

## 2018-03-21 RX ORDER — LORAZEPAM 2 MG/ML
1-2 INJECTION INTRAMUSCULAR EVERY 30 MIN PRN
Status: DISCONTINUED | OUTPATIENT
Start: 2018-03-21 | End: 2018-03-24 | Stop reason: HOSPADM

## 2018-03-21 RX ORDER — LORAZEPAM 2 MG/ML
1 INJECTION INTRAMUSCULAR
Status: COMPLETED | OUTPATIENT
Start: 2018-03-21 | End: 2018-03-21

## 2018-03-21 RX ORDER — HYDROMORPHONE HYDROCHLORIDE 1 MG/ML
INJECTION, SOLUTION INTRAMUSCULAR; INTRAVENOUS; SUBCUTANEOUS
Status: COMPLETED
Start: 2018-03-21 | End: 2018-03-21

## 2018-03-21 RX ORDER — POTASSIUM CHLORIDE 1.5 G/1.58G
20-40 POWDER, FOR SOLUTION ORAL
Status: DISCONTINUED | OUTPATIENT
Start: 2018-03-21 | End: 2018-03-24 | Stop reason: HOSPADM

## 2018-03-21 RX ORDER — POTASSIUM CL/LIDO/0.9 % NACL 10MEQ/0.1L
10 INTRAVENOUS SOLUTION, PIGGYBACK (ML) INTRAVENOUS
Status: DISCONTINUED | OUTPATIENT
Start: 2018-03-21 | End: 2018-03-24 | Stop reason: HOSPADM

## 2018-03-21 RX ORDER — DIAZEPAM 10 MG/2ML
5-10 INJECTION, SOLUTION INTRAMUSCULAR; INTRAVENOUS EVERY 30 MIN PRN
Status: DISCONTINUED | OUTPATIENT
Start: 2018-03-21 | End: 2018-03-21

## 2018-03-21 RX ORDER — OXYCODONE HYDROCHLORIDE 5 MG/1
5-10 TABLET ORAL
Status: DISCONTINUED | OUTPATIENT
Start: 2018-03-21 | End: 2018-03-23

## 2018-03-21 RX ORDER — PROCHLORPERAZINE 25 MG
25 SUPPOSITORY, RECTAL RECTAL EVERY 12 HOURS PRN
Status: DISCONTINUED | OUTPATIENT
Start: 2018-03-21 | End: 2018-03-24 | Stop reason: HOSPADM

## 2018-03-21 RX ORDER — NALOXONE HYDROCHLORIDE 0.4 MG/ML
.1-.4 INJECTION, SOLUTION INTRAMUSCULAR; INTRAVENOUS; SUBCUTANEOUS
Status: DISCONTINUED | OUTPATIENT
Start: 2018-03-21 | End: 2018-03-24 | Stop reason: HOSPADM

## 2018-03-21 RX ORDER — ALBUTEROL SULFATE 5 MG/ML
2.5 SOLUTION RESPIRATORY (INHALATION)
Status: DISCONTINUED | OUTPATIENT
Start: 2018-03-21 | End: 2018-03-24 | Stop reason: HOSPADM

## 2018-03-21 RX ORDER — PREDNISOLONE ACETATE 10 MG/ML
1 SUSPENSION/ DROPS OPHTHALMIC 2 TIMES DAILY
Status: ON HOLD | COMMUNITY
End: 2018-04-29

## 2018-03-21 RX ORDER — PROCHLORPERAZINE MALEATE 5 MG
10 TABLET ORAL EVERY 6 HOURS PRN
Status: DISCONTINUED | OUTPATIENT
Start: 2018-03-21 | End: 2018-03-24 | Stop reason: HOSPADM

## 2018-03-21 RX ORDER — ONDANSETRON 2 MG/ML
4 INJECTION INTRAMUSCULAR; INTRAVENOUS EVERY 6 HOURS PRN
Status: DISCONTINUED | OUTPATIENT
Start: 2018-03-21 | End: 2018-03-24 | Stop reason: HOSPADM

## 2018-03-21 RX ORDER — POTASSIUM CHLORIDE 29.8 MG/ML
20 INJECTION INTRAVENOUS
Status: DISCONTINUED | OUTPATIENT
Start: 2018-03-21 | End: 2018-03-24 | Stop reason: HOSPADM

## 2018-03-21 RX ORDER — POTASSIUM CHLORIDE 1500 MG/1
20-40 TABLET, EXTENDED RELEASE ORAL
Status: DISCONTINUED | OUTPATIENT
Start: 2018-03-21 | End: 2018-03-24 | Stop reason: HOSPADM

## 2018-03-21 RX ORDER — ALBUTEROL SULFATE 90 UG/1
2 AEROSOL, METERED RESPIRATORY (INHALATION) EVERY 6 HOURS
Status: CANCELLED | OUTPATIENT
Start: 2018-03-21

## 2018-03-21 RX ORDER — NICOTINE 21 MG/24HR
1 PATCH, TRANSDERMAL 24 HOURS TRANSDERMAL DAILY
Status: DISCONTINUED | OUTPATIENT
Start: 2018-03-21 | End: 2018-03-24 | Stop reason: HOSPADM

## 2018-03-21 RX ORDER — ONDANSETRON 2 MG/ML
INJECTION INTRAMUSCULAR; INTRAVENOUS
Status: COMPLETED
Start: 2018-03-21 | End: 2018-03-21

## 2018-03-21 RX ORDER — LABETALOL HYDROCHLORIDE 5 MG/ML
10 INJECTION, SOLUTION INTRAVENOUS
Status: DISCONTINUED | OUTPATIENT
Start: 2018-03-21 | End: 2018-03-24 | Stop reason: HOSPADM

## 2018-03-21 RX ORDER — OCTREOTIDE ACETATE 100 UG/ML
80 INJECTION, SOLUTION INTRAVENOUS; SUBCUTANEOUS ONCE
Status: COMPLETED | OUTPATIENT
Start: 2018-03-21 | End: 2018-03-21

## 2018-03-21 RX ORDER — LORAZEPAM 1 MG/1
1-2 TABLET ORAL EVERY 30 MIN PRN
Status: DISCONTINUED | OUTPATIENT
Start: 2018-03-21 | End: 2018-03-24 | Stop reason: HOSPADM

## 2018-03-21 RX ORDER — HYDROMORPHONE HYDROCHLORIDE 1 MG/ML
0.2 INJECTION, SOLUTION INTRAMUSCULAR; INTRAVENOUS; SUBCUTANEOUS
Status: DISCONTINUED | OUTPATIENT
Start: 2018-03-21 | End: 2018-03-23

## 2018-03-21 RX ORDER — ONDANSETRON 4 MG/1
4 TABLET, ORALLY DISINTEGRATING ORAL EVERY 6 HOURS PRN
Status: DISCONTINUED | OUTPATIENT
Start: 2018-03-21 | End: 2018-03-24 | Stop reason: HOSPADM

## 2018-03-21 RX ORDER — POLYETHYLENE GLYCOL 3350 17 G/17G
17 POWDER, FOR SOLUTION ORAL DAILY PRN
Status: DISCONTINUED | OUTPATIENT
Start: 2018-03-21 | End: 2018-03-24 | Stop reason: HOSPADM

## 2018-03-21 RX ORDER — BISACODYL 10 MG
10 SUPPOSITORY, RECTAL RECTAL DAILY PRN
Status: DISCONTINUED | OUTPATIENT
Start: 2018-03-21 | End: 2018-03-24 | Stop reason: HOSPADM

## 2018-03-21 RX ORDER — DIAZEPAM 5 MG
10 TABLET ORAL EVERY 30 MIN PRN
Status: DISCONTINUED | OUTPATIENT
Start: 2018-03-21 | End: 2018-03-21

## 2018-03-21 RX ADMIN — LORAZEPAM 1 MG: 2 INJECTION INTRAMUSCULAR; INTRAVENOUS at 17:31

## 2018-03-21 RX ADMIN — ONDANSETRON 4 MG: 2 INJECTION INTRAMUSCULAR; INTRAVENOUS at 19:39

## 2018-03-21 RX ADMIN — LORAZEPAM 1 MG: 2 INJECTION INTRAMUSCULAR; INTRAVENOUS at 16:02

## 2018-03-21 RX ADMIN — OCTREOTIDE ACETATE 50 MCG/HR: 200 INJECTION, SOLUTION INTRAVENOUS; SUBCUTANEOUS at 16:57

## 2018-03-21 RX ADMIN — DEXTROSE AND SODIUM CHLORIDE: 5; 900 INJECTION, SOLUTION INTRAVENOUS at 19:49

## 2018-03-21 RX ADMIN — HYDROMORPHONE HYDROCHLORIDE 0.2 MG: 1 INJECTION, SOLUTION INTRAMUSCULAR; INTRAVENOUS; SUBCUTANEOUS at 19:38

## 2018-03-21 RX ADMIN — OCTREOTIDE ACETATE 80 MCG: 100 INJECTION, SOLUTION INTRAVENOUS; SUBCUTANEOUS at 16:53

## 2018-03-21 RX ADMIN — NICOTINE 1 PATCH: 21 PATCH, EXTENDED RELEASE TRANSDERMAL at 21:05

## 2018-03-21 RX ADMIN — LORAZEPAM 1 MG: 2 INJECTION INTRAMUSCULAR; INTRAVENOUS at 20:52

## 2018-03-21 RX ADMIN — SODIUM CHLORIDE 1000 ML: 9 INJECTION, SOLUTION INTRAVENOUS at 15:58

## 2018-03-21 RX ADMIN — LORAZEPAM 1 MG: 2 INJECTION INTRAMUSCULAR; INTRAVENOUS at 23:25

## 2018-03-21 RX ADMIN — FOLIC ACID: 5 INJECTION, SOLUTION INTRAMUSCULAR; INTRAVENOUS; SUBCUTANEOUS at 20:51

## 2018-03-21 RX ADMIN — PANTOPRAZOLE SODIUM 80 MG: 40 INJECTION, POWDER, FOR SOLUTION INTRAVENOUS at 16:03

## 2018-03-21 ASSESSMENT — PAIN DESCRIPTION - DESCRIPTORS: DESCRIPTORS: ACHING

## 2018-03-21 ASSESSMENT — ENCOUNTER SYMPTOMS
TREMORS: 1
ABDOMINAL PAIN: 0
SHORTNESS OF BREATH: 1
NAUSEA: 0
DIARRHEA: 0
HEADACHES: 1
VOMITING: 1
DIZZINESS: 1
BLOOD IN STOOL: 0
LIGHT-HEADEDNESS: 1

## 2018-03-21 NOTE — CONSULTS
[facility]    Gastroenterology Consultation    Date of Admission:  3/21/2018  Date of Consult (When I saw the patient): 03/21/18    Assessment & Plan   Jim Richard is a 63 year old male who was admitted on 3/21/2018. I was asked to see the patient for hematemesis.    Hematemesis  Alcohol abuse disorder  Drop in hemoglobin due to blood loss  Alcohol related mild thrombocytopenia  Elevated liver tests due to alcohol  Hepatomegaly, secondary to alcohol  Baseline tremor  Suspected early alcohol withdrawal  NSAID use today, but otherwise rarely:    The causes of bleeding in this setting could be due to esophageal varices, gastritis varices or ulcers of gastric or duodenum, AVMs, Dieulafoy lesion, neoplasm.  EGD is indicated to diagnose and treat, based on findings.  The patient likely has alcoholic hepatitis with significant hepatomegaly.  An abdominal ultrasound will be needed to further assess the liver parenchyma and check for ascites.    Plan:  IV Octreotide and IV PPI.  Stabilize, transfuse  Alcohol Withdrawal protocol  EGD urgently based on clinical signs/symptoms vs semi-urgently in AM. Will require MAC anesthesia.  CD consult when able      Valeri Pruitt MD  [facility]  Office: [phone]  Cell:  [phone]  Monday through Thursday 8am to 5pm, Friday 8am to 4pm    Reason for Consult   Reason for consult: I was asked by Dr. Obregon to evaluate this patient for hematemesis.    Primary Care Physician   [facility]    Chief Complaint   Throwing up blood    History is obtained from the patient and medical records    History of Present Illness   Jim Richard is a 63 year old male who presents with acute hematemesis at about 2 PM this afternoon. He has not had any further hematemesis since then.  The patient has a long history of alcohol abuse, but has never had GI bleeding.  This afternoon, after drinking 1 pint of vodka last PM (usual for  him), he felt nauseated and threw up about 1 pint of BRB, per his estimation.  He denies any aspirin use, but he did take 2 Advil tabs today for a sore neck, which is not usual for him.  He denies any abdominal pain or indigestion. There has not been any melena nor hematochezia.  He has not felt lightheaded or dizzy.  He has a baseline tremor, but is feeling shaky now, as if he is having alcohol withdrawal symptoms.  There is a history of CAD, s/p MI, PVD, anxiety and depression.  He has no stents and is not on any anticoagulants.  There is no history of ascites.      Past Medical History    I have reviewed this patient's medical history and updated it with pertinent information if needed.   Past Medical History:   Diagnosis Date     Alcoholism (H) 1/14/2013    had treatment at [facility]     Anxiety      Coronary artery disease      Depression     w/anxiety     Heart attack      Hepatomegaly      Hyperlipemia      Hypertension      Peripheral vascular disease (H)      PVD (peripheral vascular disease) (H)      Sleep apnea      Spinal stenosis        Past Surgical History   I have reviewed this patient's surgical history and updated it with pertinent information if needed.  Past Surgical History:   Procedure Laterality Date     APPENDECTOMY       BYPASS GRAFT FEMOROPOPLITEAL  6/12/2012    Procedure: BYPASS GRAFT FEMOROPOPLITEAL;  LEFT FEMORAL TO ABOVE KNEE POPLITEAL BYPASS, ENDARTERECTOMY OF SFA AND PF ARTERIES, LEFT EXTERNAL ILIAC TO COMMON FEMORAL INTERPOSITION GRAFT;  Surgeon: Damir Roberts MD;  Location:  OR     COLONOSCOPY       ENDARTERECTOMY FEMORAL  6/12/2012    Procedure: ENDARTERECTOMY FEMORAL;;  Surgeon: Damir Roberts MD;  Location:  OR     HERNIA REPAIR  2017    Abdominal     LAMINECTOMY, FUSION LUMBAR THREE+ LEVEL, COMBINED N/A 9/22/2014    Procedure: COMBINED LAMINECTOMY, FUSION LUMBAR THREE+ LEVEL;  Surgeon: Sebastien Pruitt MD;  Location:  OR     TONSILLECTOMY & ADENOIDECTOMY          Prior to Admission Medications   Prior to Admission Medications   Prescriptions Last Dose Informant Patient Reported? Taking?   ASPIRIN EC PO 3/20/2018 at Unknown time Self Yes Yes   Sig: Take 325-650 mg by mouth 2 times daily as needed for moderate pain (Headache)    ATORVASTATIN CALCIUM PO 3/20/2018 at pm Pharmacy Yes Yes   Sig: Take 10 mg by mouth At Bedtime    DULoxetine (CYMBALTA) 60 MG EC capsule 3/21/2018 at am Pharmacy No Yes   Sig: Take 1 capsule (60 mg) by mouth daily   GABAPENTIN PO Past Month at Unknown time Pharmacy Yes Yes   Sig: Take 900 mg by mouth 3 times daily as needed   HYDROXYZINE HCL PO 3/21/2018 at Unknown time Pharmacy Yes Yes   Sig: Take 50 mg by mouth 3 times daily as needed for other (anxiety)    IBUPROFEN PO 3/21/2018 at am Self Yes Yes   Sig: Take 200-400 mg by mouth daily as needed for moderate pain   MELOXICAM PO Past Month at Unknown time Pharmacy Yes Yes   Sig: Take 15 mg by mouth daily as needed   MIRTAZAPINE PO 3/20/2018 at hs Pharmacy Yes Yes   Sig: Take 30 mg by mouth At Bedtime   QUETIAPINE FUMARATE PO prn med Pharmacy Yes Yes   Sig: Take 50 mg by mouth 3 times daily as needed   TAMSULOSIN HCL PO 3/21/2018 at am Pharmacy Yes Yes   Sig: Take 0.4 mg by mouth daily   TRAZODONE HCL PO 3/20/2018 at hs Pharmacy Yes Yes   Sig: Take 200 mg by mouth At Bedtime (Takes 2 x 100mg for a total of 200mg at bedtime)   albuterol (PROAIR HFA/PROVENTIL HFA/VENTOLIN HFA) 108 (90 BASE) MCG/ACT Inhaler prn med Pharmacy Yes Yes   Sig: Inhale 2 puffs into the lungs every 6 hours as needed    fluticasone-vilanterol (BREO ELLIPTA) 200-25 MCG/INH oral inhaler prn med Pharmacy Yes Yes   Sig: Inhale 1 puff into the lungs daily as needed    prednisoLONE acetate (PRED FORTE) 1 % ophthalmic susp 3/21/2018 at am x 1 dose Pharmacy Yes Yes   Sig: Place 1 drop into the right eye 2 times daily X 7 more days. Post cataract   spironolactone-hydrochlorothiazide (ALDACTAZIDE) 25-25 MG per tablet 3/21/2018 at am  Pharmacy Yes Yes   Sig: Take 1 tablet by mouth daily      Facility-Administered Medications: None     Allergies   No Known Allergies    Social History   I have reviewed this patient's social history and updated it with pertinent information if needed. Jim Richard  reports that he has been smoking Cigarettes.  He has been smoking about 1.00 pack per day. He has never used smokeless tobacco. He reports that he drinks alcohol. He reports that he does not use illicit drugs.    Family History   I have reviewed this patient's family history and updated it with pertinent information if needed.   Family History   Problem Relation Age of Onset     MENTAL ILLNESS Son      Coronary Artery Disease Early Onset Mother      Substance Abuse Father      CANCER Father      Substance Abuse Brother      Unknown/Adopted No family hx of      Depression No family hx of      Anxiety Disorder No family hx of      Schizophrenia No family hx of      Bipolar Disorder No family hx of      Suicide No family hx of      Dementia No family hx of      Dillingham Disease No family hx of      Parkinsonism No family hx of      Autism Spectrum Disorder No family hx of      Intellectual Disability (Mental Retardation) No family hx of        Review of Systems   The 10 point Review of Systems is negative other than noted in the HPI or here.     Physical Exam   Temp: 98.9  F (37.2  C) Temp src: Oral BP: 123/77 Pulse: 86 Heart Rate: 81 Resp: 20 SpO2: 98 % O2 Device: None (Room air)    Vital Signs with Ranges  Temp:  [98.5  F (36.9  C)-98.9  F (37.2  C)] 98.9  F (37.2  C)  Pulse:  [86-95] 86  Heart Rate:  [] 81  Resp:  [9-24] 20  BP: (100-137)/() 123/77  SpO2:  [95 %-99 %] 98 %  205 lbs 0 oz      Constitutional: Awake, alert, cooperative, no apparent distress. Mild tremor of hands.  Eyes: Conjunctiva and pupils examined. No icterus, pale conjunctivae.  HEENT: Moist mucous membranes, normal dentition.  Respiratory: Clear to auscultation  bilaterally, no crackles or wheezing.  Cardiovascular: Regular rate and rhythm, normal S1 and S2, and no murmur noted.  GI: Soft, large abdomen, no fluid shift, non-tender, normal bowel sounds. Very enlarged liver with edge down about 6 FBS. No masses, no splenomegaly.  Lymph/Hematologic: No anterior cervical or supraclavicular adenopathy.  Skin: No rashes, no cyanosis, no edema. Scattered spider angiomata present on upper chest.  Musculoskeletal: No joint swelling, erythema or tenderness.  Neurologic: Cranial nerves 2-12 intact, normal strength and sensation.  Psychiatric: Alert, oriented to person, place and time.  Extr: no CCE.    Data   -Data reviewed today: All pertinent laboratory and imaging results from this encounter were reviewed.    Recent Labs  Lab 03/21/18  1555   WBC 6.0   HGB 7.4*   MCV 78   *   INR 1.31*   *   POTASSIUM 3.9   CHLORIDE 94   CO2 25   BUN 46*   CR 0.96   ANIONGAP 11   KEV 7.7*   *   ALBUMIN 2.9*   PROTTOTAL 6.0*   BILITOTAL 0.9   ALKPHOS 172*   ALT 74*   *   LIPASE 316     No results for input(s): IRON, IRONSAT, RETICABSCT, RETP, FEB, KATE, B12, FOLIC, EPOE, MORPH in the last 168 hours.    Recent Results (from the past 24 hour(s))   Chest XR,  PA & LAT    Narrative    CHEST TWO VIEWS 3/21/2018 4:23 PM     HISTORY: Shortness of breath after vomiting, check for aspiration.    COMPARISON: 10/7/2016    FINDINGS: Heart size and pulmonary vascularity are within normal  limits. The lungs are clear. No pneumothorax or pleural effusion.       Impression    IMPRESSION: No radiographic evidence of acute chest abnormality.     ASHLEY CHEN MD   Head CT w/o contrast    Narrative    CT SCAN OF THE HEAD WITHOUT CONTRAST   3/21/2018 4:33 PM     HISTORY: Alcohol use and fell last night, with left occipital  headache.    TECHNIQUE: Axial images of the head and coronal reformations without  IV contrast material. Radiation dose for this scan was reduced using  automated  exposure control, adjustment of the mA and/or kV according  to patient size, or iterative reconstruction technique.    COMPARISON: 10/7/2016    FINDINGS: There is generalized atrophy of the brain, unchanged. There  is no evidence of intracranial hemorrhage, mass, acute infarct or  anomaly.     The visualized portions of the sinuses and mastoids appear normal.  There is no evidence of trauma.      Impression    IMPRESSION:  1. No acute abnormality.  2. Brain atrophy, unchanged.

## 2018-03-21 NOTE — IP AVS SNAPSHOT
[facility] [address] [facility]    [address] [place] [address]    Phone:  [phone]                                       After Visit Summary   3/21/2018    Jim Richard    MRN: [id number]           After Visit Summary Signature Page     I have received my discharge instructions, and my questions have been answered. I have discussed any challenges I see with this plan with the nurse or doctor.    ..........................................................................................................................................  Patient/Patient Representative Signature      ..........................................................................................................................................  Patient Representative Print Name and Relationship to Patient    ..................................................               ................................................  Date                                            Time    ..........................................................................................................................................  Reviewed by Signature/Title    ...................................................              ..............................................  Date                                                            Time

## 2018-03-21 NOTE — ED PROVIDER NOTES
History     Chief Complaint:  Hematemesis    HPI   Jim Richard is a 63 year old male with a history of alcoholism, CAD, hypertension, and GERD who presents with hematemesis. The patient reports developing a headache in the back left side of his head with bilateral leg cramping last night but noted no chest pain or shortness of breath. Today, he began vomiting bright red blood with clots at 1400 and complains of continued dizziness, shortness of breath while vomiting, and headaches. He notes no nausea prior to the emesis and EMS reports him vomiting approximately 500 cc's of blood today. EMS states that his vitals were stable en route, -110's systolically, heart rate 128, blood sugar 168, and given 4 mg Zofran. In the ED, the patient reports that he normally drinks a pint of vodka a day and has been binging since December. His last drink was last night and he has not eaten anything today. He has a history of tremors due to withdrawals but no seizures. He reports falling once last night due to the leg cramping and once this morning due to the dizziness but denies loss of consciousness or head trauma.  He denies abdominal pain, diarrhea, or blood in his stool. He denies chest pain. He has taken no aspirin today and is not on blood thinners.    Allergies:  No known drug allergies      Medications:    Tamusolin  Breo  Albuterol inhaler  Duloxetine  Mirtazapine  Atorvastatin  Aldactazide  Quetiapine fumarate  Trazodone  Hydroxyzine     Past Medical History:    Alcoholism  Anxiety  CAD  Depression  Heart attack  Hepatomegaly  Hyperlipidemia  Hypertension  Peripheral vascular disease  Sleep apnea  Spinal stenosis    Past Surgical History:    Appendectomy  Femoropopliteal bypass graft  Colonoscopy  Femoral endarterectomy  Hernia repair  Fusion lumbar laminectomy  Tonsillectomy and adenoidectomy     Family History:    Mental illness - son   CAD - mother  Substance abuse - father, brother  Cancer - father  "    Social History:  Smoking status: yes  Alcohol use: yes   [facility]   Patient [facility] via EMS.   Marital Status:  Single      Review of Systems   Respiratory: Positive for shortness of breath.    Cardiovascular: Negative for chest pain.   Gastrointestinal: Positive for vomiting (hematemesis). Negative for abdominal pain, blood in stool, diarrhea and nausea.   Neurological: Positive for dizziness, tremors, light-headedness and headaches. Negative for syncope.   All other systems reviewed and are negative.    Physical Exam     Patient Vitals for the past 24 hrs:   BP Temp Temp src Pulse Heart Rate Resp SpO2 Height Weight   03/21/18 1608 137/71 - - - 100 11 96 % - -   03/21/18 1607 - - - - 93 - 96 % - -   03/21/18 1606 - - - - 101 23 95 % - -   03/21/18 1605 - - - - 96 24 98 % - -   03/21/18 1604 - - - - 98 13 97 % - -   03/21/18 1603 - - - - 99 9 97 % - -   03/21/18 1602 - - - - 99 11 99 % - -   03/21/18 1601 - - - - 98 19 97 % - -   03/21/18 1531 100/64 98.5  F (36.9  C) Oral 95 97 22 98 % 1.702 m (5' 7\") 93 kg (205 lb)   03/21/18 1530 - - - - 110 21 97 % - -   03/21/18 1529 - - - - 98 24 97 % - -      Physical Exam  Nursing note and vitals reviewed.  Constitutional:  Appears well-developed and well-nourished. Dried blood around his mouth and nose.  HENT:   Head:    Atraumatic.   Mouth/Throat:   Oropharynx is clear and moist. No oropharyngeal exudate.   Eyes:    Pupils are equal, round, and reactive to light.   Neck:    Normal range of motion. Neck supple.      No tracheal deviation present. No thyromegaly present.   Cardiovascular:  Mildly tachycardic rate, regular rhythm, no murmur   Pulmonary/Chest: Breath sounds are clear and equal without wheezes or crackles.  Abdominal:   Soft. Bowel sounds are normal. Exhibits no distension and      no mass. There is no tenderness.      There is no rebound and no guarding.   Musculoskeletal:  Exhibits no edema.   Lymphadenopathy:  No cervical " adenopathy.   Neurological:   Alert and oriented to person, place, and time. GCS 15.  CN 2-12 intact.  and proximal upper extremity strength strong and equal.  Bilateral lower extremity strength strong and equal, including strong dorsiflexion and plantarflexion strength.  Sensation intact and equal to the face, arms and legs.  No facial droop or weakness. Normal speech.  Follows commands and answers questions normally.  Hands are slightly tremulous.  Skin:    Skin is warm and dry. No rash noted. No pallor.      Emergency Department Course   ECG (15:48):  Rate 97 bpm. TX interval 146. QT/QTc 402/510.   Normal sinus rhythm. Right bundle branch block. Abnormal ECG.  Interpreted at 1548 by Danae Obregon MD.      Imaging:  Radiographic findings were communicated with the patient who voiced understanding of the findings.    CT-scan Head w/o contrast:  1. No acute abnormality.  2. Brain atrophy, unchanged.  Result per radiology.      X-ray Chest, 2 views:  No radiographic evidence of acute chest abnormality.    As read by Radiology.     Laboratory:  CBC: HGB 7.4 (L),  (L), o/w WNL (WBC 6.0)   CMP:  (L), Glucose 155 (H), BUN 46 (H), Calcium 7.7 (L), Albumin 2.9 (L), Protein total 6.0 (L), ALKPHOS 172 (H), ALT 74 (H),  (H), o/w WNL (Creatinine 0.96)  Lipase: 316  Alcohol level: <0.01  INR: 1.31 (H)  Blood cultures x2 pending  Blood type: A-    Interventions:  1558: NS 1L IV Bolus   1602: Ativan 1 mg IV  1603: Protonix 80 mg IV  1657: Sandostatin 1250 mcg IV    Emergency Department Course:  Past medical records, nursing notes, and vitals reviewed.  1525: I performed an exam of the patient and obtained history, as documented above. GCS 15.  1532: I discussed the patient with Dr. Pruitt, GI  IV inserted and blood drawn.   The patient was taken for CT, see imaging results above.     1633: I rechecked the patient and discussed admission and treatment plan.  Findings and plan explained to the  Patient who consents to admission.     1645: Discussed the patient with Dr. Tapia, who will admit the patient to an ICU bed for further monitoring, evaluation, and treatment.      Impression & Plan      Medical Decision Making:  Jim Richard is a 63 year old male who arrives because of hematemesis and I am extremely concerned about acute esophageal variceal bleeding, considering he has a history of alcoholism and forceful vomiting. I found him to have significant blood loss anemia with a hemoglobin down to 7.4, and his baseline is 11.9 from several months ago. Therefore, since he was tachycardic here, I transfused him one unit of packed RBC's after the risks and benefits of blood transfusions were discussed with him and he signed the consent form. Shortly after the patients arrival to the ED, I discussed the case with gastroenterology on call doctor Valeri Pruitt, and the plan is that he will be admitted to the ICU, and she will perform endoscopy to check for varices and potential banding this evening. He was placed on Octreotide and Protonix also. He was given a banana bag IV and kept NPO and admitted to the ICU. The patient has a headache which could have been due to hitting his head, so a head CT was obtained without any signed of intracranial bleed or head fracture. He felt shortness of breath after vomiting, so a chest xray was performed but showed no sign of aspiration. He has no neck pain or tenderness, so I did not feel there was any concern for cervical spine fracture. He was admitted to the ICU, critical condition  Critical Care Time: 45 minutes spent managing this patient.    Diagnosis:    ICD-10-CM    1. Hematemesis without nausea K92.0    2. Anemia due to blood loss, acute D62    3. Alcohol withdrawal syndrome without complication (H) F10.230        Disposition:  Admitted to ICU    Jaky Emery  3/21/2018    EMERGENCY DEPARTMENT  I, Jaky Emery, am serving as a scribe at 3:25 PM on 3/21/2018  to document services personally performed by Danae Dunham MD based on my observations and the provider's statements to me.        Danae Obregon MD  03/21/18 7096

## 2018-03-21 NOTE — PHARMACY-ADMISSION MEDICATION HISTORY
Admission medication history interview status for the 3/21/2018  admission is complete. See EPIC admission navigator for prior to admission medications     Medication history source reliability:Good    Actions taken by pharmacist (provider contacted, etc):Verified all Rx medications with patient's retail pharmacy - [facility]     Additional medication history information not noted on PTA med list :  1) Patient has been taking multiple medications with risk of bleeding (meloxicam, ibuprofen & aspirin)    Medication reconciliation/reorder completed by provider prior to medication history?Yes    Time spent in this activity: 30 minutes    Prior to Admission medications    Medication Sig Last Dose Taking? Auth Provider   MIRTAZAPINE PO Take 30 mg by mouth At Bedtime 3/20/2018 at hs Yes Unknown, Entered By History   MELOXICAM PO Take 15 mg by mouth daily as needed Past Month at Unknown time Yes Unknown, Entered By History   IBUPROFEN PO Take 200-400 mg by mouth daily as needed for moderate pain 3/21/2018 at am Yes Unknown, Entered By History   GABAPENTIN PO Take 900 mg by mouth 3 times daily as needed Past Month at Unknown time Yes Unknown, Entered By History   prednisoLONE acetate (PRED FORTE) 1 % ophthalmic susp Place 1 drop into the right eye 2 times daily X 7 more days. Post cataract 3/21/2018 at am x 1 dose Yes Unknown, Entered By History   ASPIRIN EC PO Take 325-650 mg by mouth 2 times daily as needed for moderate pain (Headache)  3/20/2018 at Unknown time Yes Unknown, Entered By History   TAMSULOSIN HCL PO Take 0.4 mg by mouth daily 3/21/2018 at am Yes Unknown, Entered By History   fluticasone-vilanterol (BREO ELLIPTA) 200-25 MCG/INH oral inhaler Inhale 1 puff into the lungs daily as needed  prn med Yes Unknown, Entered By History   albuterol (PROAIR HFA/PROVENTIL HFA/VENTOLIN HFA) 108 (90 BASE) MCG/ACT Inhaler Inhale 2 puffs into the lungs every 6 hours as needed  prn med Yes Unknown, Entered By History    DULoxetine (CYMBALTA) 60 MG EC capsule Take 1 capsule (60 mg) by mouth daily 3/21/2018 at am Yes Brody Rand MD   ATORVASTATIN CALCIUM PO Take 10 mg by mouth At Bedtime  3/20/2018 at pm Yes Unknown, Entered By History   spironolactone-hydrochlorothiazide (ALDACTAZIDE) 25-25 MG per tablet Take 1 tablet by mouth daily 3/21/2018 at am Yes Unknown, Entered By History   QUETIAPINE FUMARATE PO Take 50 mg by mouth 3 times daily as needed prn med Yes Unknown, Entered By History   TRAZODONE HCL PO Take 200 mg by mouth At Bedtime (Takes 2 x 100mg for a total of 200mg at bedtime) 3/20/2018 at hs Yes Unknown, Entered By History   HYDROXYZINE HCL PO Take 50 mg by mouth 3 times daily as needed for other (anxiety)  3/21/2018 at Unknown time Yes Reported, Patient

## 2018-03-21 NOTE — ED NOTES
Bed: ED25  Expected date:   Expected time:   Means of arrival:   Comments:  [facility] - 446 - 63M vomiting blood eta 1519

## 2018-03-21 NOTE — IP AVS SNAPSHOT
MRN:[id number]                      After Visit Summary   3/21/2018    Jim Richard    MRN: [id number]           Thank you!     Thank you for choosing [facility] for your care. Our goal is always to provide you with excellent care. Hearing back from our patients is one way we can continue to improve our services. Please take a few minutes to complete the written survey that you may receive in the mail after you visit with us. Thank you!        Patient Information     Date Of Birth          1955        About your hospital stay     You were admitted on:  March 21, 2018 You last received care in the:  [place] [facility] [address] [facility]    You were discharged on:  March 24, 2018        Reason for your hospital stay       GI bleeding.                  Who to Call     For medical emergencies, please call 911.  For non-urgent questions about your medical care, please call your primary care provider or clinic, [phone]  For questions related to your surgery, please call your surgery clinic        Attending Provider     Provider Specialty    Danae Obregon MD Emergency Medicine    ArbabiDanya MD Internal Medicine       Primary Care Provider Office Phone # Fax #    [facility] [phone]      After Care Instructions     Activity       Your activity upon discharge: activity as tolerated            Diet       Follow this diet upon discharge: Orders Placed This Encounter      Regular Diet Adult                  Follow-up Appointments     Follow-up and recommended labs and tests        Follow up with primary care provider, Vil[facility]ks[facility], within 7 days for hospital follow- up.  The following labs/tests are recommended: cbc/bmp.    Follow up with Gastroenterology as directed.                  Pending Results     No orders found from 3/19/2018 to 3/22/2018.            Statement of Approval     Ordered          03/24/18 0958  I  "have reviewed and agree with all the recommendations and orders detailed in this document.  EFFECTIVE NOW     Approved and electronically signed by:  Piter Sequeira DO             Admission Information     Date & Time Provider Department Dept. Phone    3/21/2018 Danya Grissom MD [place] [address] [facility] [phone]      Your Vitals Were     Blood Pressure Pulse Temperature Respirations Height Weight    151/68 (BP Location: Left arm) 67 98.7  F (37.1  C) (Axillary) 18 1.702 m (5' 7\") 91.1 kg (200 lb 13.4 oz)    Pulse Oximetry BMI (Body Mass Index)                95% 31.46 kg/m2          MyChart Information     [facility] lets you send messages to your doctor, view your test results, renew your prescriptions, schedule appointments and more. To sign up, go to [email or web address] . Click on \"Log in\" on the left side of the screen, which will take you to the Welcome page. Then click on \"Sign up Now\" on the right side of the page.     You will be asked to enter the access code listed below, as well as some personal information. Please follow the directions to create your username and password.     Your access code is: [email or web address]  Expires: 2018 10:53 AM     Your access code will  in 90 days. If you need help or a new code, please call your [facility] clinic or [phone].        Care EveryWhere ID     This is your Care EveryWhere ID. This could be used by other organizations to access your [facility] medical records  [id number]        Equal Access to Services     [facility]: Hadii fito Escobar, waaxda luqadaha, qaybta kaalstan short . So [facility] [phone].    ATENCIÓN: Si habla español, tiene a thompson disposición servicios gratuitos de asistencia lingüística. Llame al [phone].    We comply with applicable federal civil rights laws and [place] laws. We do not discriminate on the basis of race, color, national " origin, age, disability, sex, sexual orientation, or gender identity.               Review of your medicines      START taking        Dose / Directions    omeprazole 40 MG capsule   Commonly known as:  priLOSEC   Used for:  Hematemesis without nausea        Dose:  40 mg   Take 1 capsule (40 mg) by mouth 2 times daily (before meals) Take 30-60 minutes before a meal.   Quantity:  60 capsule   Refills:  1         CONTINUE these medicines which have NOT CHANGED        Dose / Directions    albuterol 108 (90 BASE) MCG/ACT Inhaler   Commonly known as:  PROAIR HFA/PROVENTIL HFA/VENTOLIN HFA        Dose:  2 puff   Inhale 2 puffs into the lungs every 6 hours as needed   Refills:  0       ATORVASTATIN CALCIUM PO        Dose:  10 mg   Take 10 mg by mouth At Bedtime   Refills:  0       BREO ELLIPTA 200-25 MCG/INH oral inhaler   Generic drug:  fluticasone-vilanterol        Dose:  1 puff   Inhale 1 puff into the lungs daily as needed   Refills:  0       DULoxetine 60 MG EC capsule   Commonly known as:  CYMBALTA   Used for:  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)        Dose:  60 mg   Take 1 capsule (60 mg) by mouth daily   Quantity:  30 capsule   Refills:  0       GABAPENTIN PO        Dose:  900 mg   Take 900 mg by mouth 3 times daily as needed   Refills:  0       HYDROXYZINE HCL PO        Dose:  50 mg   Take 50 mg by mouth 3 times daily as needed for other (anxiety)   Refills:  0       MIRTAZAPINE PO        Dose:  30 mg   Take 30 mg by mouth At Bedtime   Refills:  0       prednisoLONE acetate 1 % ophthalmic susp   Commonly known as:  PRED FORTE        Dose:  1 drop   Place 1 drop into the right eye 2 times daily X 7 more days. Post cataract   Refills:  0       QUETIAPINE FUMARATE PO        Dose:  50 mg   Take 50 mg by mouth 3 times daily as needed   Refills:  0       spironolactone-hydrochlorothiazide 25-25 MG per tablet   Commonly known as:  ALDACTAZIDE        Dose:  1 tablet   Take 1 tablet by mouth  daily   Refills:  0       TAMSULOSIN HCL PO        Dose:  0.4 mg   Take 0.4 mg by mouth daily   Refills:  0       TRAZODONE HCL PO        Dose:  200 mg   Take 200 mg by mouth At Bedtime (Takes 2 x 100mg for a total of 200mg at bedtime)   Refills:  0         STOP taking     ASPIRIN EC PO           IBUPROFEN PO           MELOXICAM PO                Where to get your medicines      These medications were sent to [facility] [place], [place] - [address]  [address], [place] [address]     Phone:  [phone]     omeprazole 40 MG capsule                Protect others around you: Learn how to safely use, store and throw away your medicines at www.disposemymeds.org.             Medication List: This is a list of all your medications and when to take them. Check marks below indicate your daily home schedule. Keep this list as a reference.      Medications           Morning Afternoon Evening Bedtime As Needed    albuterol 108 (90 BASE) MCG/ACT Inhaler   Commonly known as:  PROAIR HFA/PROVENTIL HFA/VENTOLIN HFA   Inhale 2 puffs into the lungs every 6 hours as needed                                ATORVASTATIN CALCIUM PO   Take 10 mg by mouth At Bedtime                                BREO ELLIPTA 200-25 MCG/INH oral inhaler   Inhale 1 puff into the lungs daily as needed   Last time this was given:  1 puff on 3/24/2018  8:19 AM   Generic drug:  fluticasone-vilanterol                                DULoxetine 60 MG EC capsule   Commonly known as:  CYMBALTA   Take 1 capsule (60 mg) by mouth daily                                GABAPENTIN PO   Take 900 mg by mouth 3 times daily as needed   Last time this was given:  400 mg on 3/24/2018  8:17 AM                                HYDROXYZINE HCL PO   Take 50 mg by mouth 3 times daily as needed for other (anxiety)                                MIRTAZAPINE PO   Take 30 mg by mouth At Bedtime                                omeprazole 40 MG capsule    Commonly known as:  priLOSEC   Take 1 capsule (40 mg) by mouth 2 times daily (before meals) Take 30-60 minutes before a meal.                                prednisoLONE acetate 1 % ophthalmic susp   Commonly known as:  PRED FORTE   Place 1 drop into the right eye 2 times daily X 7 more days. Post cataract                                QUETIAPINE FUMARATE PO   Take 50 mg by mouth 3 times daily as needed                                spironolactone-hydrochlorothiazide 25-25 MG per tablet   Commonly known as:  ALDACTAZIDE   Take 1 tablet by mouth daily                                TAMSULOSIN HCL PO   Take 0.4 mg by mouth daily                                TRAZODONE HCL PO   Take 200 mg by mouth At Bedtime (Takes 2 x 100mg for a total of 200mg at bedtime)

## 2018-03-22 ENCOUNTER — ANESTHESIA (OUTPATIENT)
Dept: SURGERY | Facility: CLINIC | Age: 63
DRG: 441 | End: 2018-03-22
Payer: MEDICARE

## 2018-03-22 ENCOUNTER — ANESTHESIA EVENT (OUTPATIENT)
Dept: SURGERY | Facility: CLINIC | Age: 63
DRG: 441 | End: 2018-03-22
Payer: MEDICARE

## 2018-03-22 LAB
ALBUMIN SERPL-MCNC: 2.8 G/DL (ref 3.4–5)
ALP SERPL-CCNC: 135 U/L (ref 40–150)
ALT SERPL W P-5'-P-CCNC: 63 U/L (ref 0–70)
ANION GAP SERPL CALCULATED.3IONS-SCNC: 7 MMOL/L (ref 3–14)
AST SERPL W P-5'-P-CCNC: 114 U/L (ref 0–45)
BILIRUB SERPL-MCNC: 1 MG/DL (ref 0.2–1.3)
BLD PROD TYP BPU: NORMAL
BLD UNIT ID BPU: 0
BLOOD PRODUCT CODE: NORMAL
BPU ID: NORMAL
BUN SERPL-MCNC: 40 MG/DL (ref 7–30)
CALCIUM SERPL-MCNC: 7.3 MG/DL (ref 8.5–10.1)
CHLORIDE SERPL-SCNC: 102 MMOL/L (ref 94–109)
CO2 SERPL-SCNC: 26 MMOL/L (ref 20–32)
CREAT SERPL-MCNC: 1.12 MG/DL (ref 0.66–1.25)
ERYTHROCYTE [DISTWIDTH] IN BLOOD BY AUTOMATED COUNT: 17.9 % (ref 10–15)
GFR SERPL CREATININE-BSD FRML MDRD: 66 ML/MIN/1.7M2
GLUCOSE BLDC GLUCOMTR-MCNC: 109 MG/DL (ref 70–99)
GLUCOSE BLDC GLUCOMTR-MCNC: 133 MG/DL (ref 70–99)
GLUCOSE SERPL-MCNC: 122 MG/DL (ref 70–99)
HBA1C MFR BLD: NORMAL % (ref 4.3–6)
HCT VFR BLD AUTO: 25.2 % (ref 40–53)
HGB BLD-MCNC: 8.3 G/DL (ref 13.3–17.7)
HGB BLD-MCNC: 8.3 G/DL (ref 13.3–17.7)
HGB BLD-MCNC: 8.4 G/DL (ref 13.3–17.7)
INR PPP: 1.34 (ref 0.86–1.14)
MCH RBC QN AUTO: 26.1 PG (ref 26.5–33)
MCHC RBC AUTO-ENTMCNC: 32.9 G/DL (ref 31.5–36.5)
MCV RBC AUTO: 79 FL (ref 78–100)
PLATELET # BLD AUTO: 115 10E9/L (ref 150–450)
POTASSIUM SERPL-SCNC: 3.8 MMOL/L (ref 3.4–5.3)
PROT SERPL-MCNC: 5.4 G/DL (ref 6.8–8.8)
RBC # BLD AUTO: 3.18 10E12/L (ref 4.4–5.9)
SODIUM SERPL-SCNC: 135 MMOL/L (ref 133–144)
TRANSFUSION STATUS PATIENT QL: NORMAL
UPPER GI ENDOSCOPY: NORMAL
WBC # BLD AUTO: 4.7 10E9/L (ref 4–11)

## 2018-03-22 PROCEDURE — A9270 NON-COVERED ITEM OR SERVICE: HCPCS | Mod: GY | Performed by: NURSE PRACTITIONER

## 2018-03-22 PROCEDURE — 85610 PROTHROMBIN TIME: CPT | Performed by: INTERNAL MEDICINE

## 2018-03-22 PROCEDURE — 12000000 ZZH R&B MED SURG/OB

## 2018-03-22 PROCEDURE — 71000015 ZZH RECOVERY PHASE 1 LEVEL 2 EA ADDTL HR: Performed by: INTERNAL MEDICINE

## 2018-03-22 PROCEDURE — 36415 COLL VENOUS BLD VENIPUNCTURE: CPT | Performed by: INTERNAL MEDICINE

## 2018-03-22 PROCEDURE — 36000050 ZZH SURGERY LEVEL 2 1ST 30 MIN: Performed by: INTERNAL MEDICINE

## 2018-03-22 PROCEDURE — 94640 AIRWAY INHALATION TREATMENT: CPT | Mod: 76

## 2018-03-22 PROCEDURE — 37000008 ZZH ANESTHESIA TECHNICAL FEE, 1ST 30 MIN: Performed by: INTERNAL MEDICINE

## 2018-03-22 PROCEDURE — 40000169 ZZH STATISTIC PRE-PROCEDURE ASSESSMENT I: Performed by: INTERNAL MEDICINE

## 2018-03-22 PROCEDURE — 99232 SBSQ HOSP IP/OBS MODERATE 35: CPT | Performed by: HOSPITALIST

## 2018-03-22 PROCEDURE — 85018 HEMOGLOBIN: CPT | Performed by: INTERNAL MEDICINE

## 2018-03-22 PROCEDURE — 25000566 ZZH SEVOFLURANE, EA 15 MIN: Performed by: INTERNAL MEDICINE

## 2018-03-22 PROCEDURE — 25000125 ZZHC RX 250: Performed by: NURSE ANESTHETIST, CERTIFIED REGISTERED

## 2018-03-22 PROCEDURE — 37000009 ZZH ANESTHESIA TECHNICAL FEE, EACH ADDTL 15 MIN: Performed by: INTERNAL MEDICINE

## 2018-03-22 PROCEDURE — 71000014 ZZH RECOVERY PHASE 1 LEVEL 2 FIRST HR: Performed by: INTERNAL MEDICINE

## 2018-03-22 PROCEDURE — A9270 NON-COVERED ITEM OR SERVICE: HCPCS | Mod: GY | Performed by: INTERNAL MEDICINE

## 2018-03-22 PROCEDURE — 94660 CPAP INITIATION&MGMT: CPT

## 2018-03-22 PROCEDURE — 25000128 H RX IP 250 OP 636: Performed by: INTERNAL MEDICINE

## 2018-03-22 PROCEDURE — 25000132 ZZH RX MED GY IP 250 OP 250 PS 637: Mod: GY | Performed by: NURSE PRACTITIONER

## 2018-03-22 PROCEDURE — 80053 COMPREHEN METABOLIC PANEL: CPT | Performed by: INTERNAL MEDICINE

## 2018-03-22 PROCEDURE — 00000146 ZZHCL STATISTIC GLUCOSE BY METER IP

## 2018-03-22 PROCEDURE — 40000275 ZZH STATISTIC RCP TIME EA 10 MIN

## 2018-03-22 PROCEDURE — P9016 RBC LEUKOCYTES REDUCED: HCPCS | Performed by: EMERGENCY MEDICINE

## 2018-03-22 PROCEDURE — 25000128 H RX IP 250 OP 636: Performed by: NURSE ANESTHETIST, CERTIFIED REGISTERED

## 2018-03-22 PROCEDURE — 0DJ08ZZ INSPECTION OF UPPER INTESTINAL TRACT, VIA NATURAL OR ARTIFICIAL OPENING ENDOSCOPIC: ICD-10-PCS | Performed by: INTERNAL MEDICINE

## 2018-03-22 PROCEDURE — 25000125 ZZHC RX 250: Performed by: INTERNAL MEDICINE

## 2018-03-22 PROCEDURE — 25000132 ZZH RX MED GY IP 250 OP 250 PS 637: Mod: GY | Performed by: INTERNAL MEDICINE

## 2018-03-22 PROCEDURE — 85027 COMPLETE CBC AUTOMATED: CPT | Performed by: INTERNAL MEDICINE

## 2018-03-22 PROCEDURE — 36000052 ZZH SURGERY LEVEL 2 EA 15 ADDTL MIN: Performed by: INTERNAL MEDICINE

## 2018-03-22 PROCEDURE — 25000128 H RX IP 250 OP 636: Performed by: ANESTHESIOLOGY

## 2018-03-22 RX ORDER — ONDANSETRON 2 MG/ML
INJECTION INTRAMUSCULAR; INTRAVENOUS PRN
Status: DISCONTINUED | OUTPATIENT
Start: 2018-03-22 | End: 2018-03-22

## 2018-03-22 RX ORDER — SODIUM CHLORIDE, SODIUM LACTATE, POTASSIUM CHLORIDE, CALCIUM CHLORIDE 600; 310; 30; 20 MG/100ML; MG/100ML; MG/100ML; MG/100ML
INJECTION, SOLUTION INTRAVENOUS CONTINUOUS PRN
Status: DISCONTINUED | OUTPATIENT
Start: 2018-03-22 | End: 2018-03-22

## 2018-03-22 RX ORDER — LIDOCAINE 40 MG/G
CREAM TOPICAL
Status: DISCONTINUED | OUTPATIENT
Start: 2018-03-22 | End: 2018-03-22 | Stop reason: HOSPADM

## 2018-03-22 RX ORDER — ONDANSETRON 2 MG/ML
4 INJECTION INTRAMUSCULAR; INTRAVENOUS EVERY 30 MIN PRN
Status: DISCONTINUED | OUTPATIENT
Start: 2018-03-22 | End: 2018-03-22 | Stop reason: HOSPADM

## 2018-03-22 RX ORDER — DEXAMETHASONE SODIUM PHOSPHATE 4 MG/ML
INJECTION, SOLUTION INTRA-ARTICULAR; INTRALESIONAL; INTRAMUSCULAR; INTRAVENOUS; SOFT TISSUE PRN
Status: DISCONTINUED | OUTPATIENT
Start: 2018-03-22 | End: 2018-03-22

## 2018-03-22 RX ORDER — LIDOCAINE HYDROCHLORIDE 20 MG/ML
INJECTION, SOLUTION INFILTRATION; PERINEURAL PRN
Status: DISCONTINUED | OUTPATIENT
Start: 2018-03-22 | End: 2018-03-22

## 2018-03-22 RX ORDER — HYDROMORPHONE HYDROCHLORIDE 1 MG/ML
.3-.5 INJECTION, SOLUTION INTRAMUSCULAR; INTRAVENOUS; SUBCUTANEOUS EVERY 5 MIN PRN
Status: DISCONTINUED | OUTPATIENT
Start: 2018-03-22 | End: 2018-03-22 | Stop reason: HOSPADM

## 2018-03-22 RX ORDER — SODIUM CHLORIDE, SODIUM LACTATE, POTASSIUM CHLORIDE, CALCIUM CHLORIDE 600; 310; 30; 20 MG/100ML; MG/100ML; MG/100ML; MG/100ML
INJECTION, SOLUTION INTRAVENOUS CONTINUOUS
Status: DISCONTINUED | OUTPATIENT
Start: 2018-03-22 | End: 2018-03-22 | Stop reason: HOSPADM

## 2018-03-22 RX ORDER — ONDANSETRON 4 MG/1
4 TABLET, ORALLY DISINTEGRATING ORAL EVERY 30 MIN PRN
Status: DISCONTINUED | OUTPATIENT
Start: 2018-03-22 | End: 2018-03-22 | Stop reason: HOSPADM

## 2018-03-22 RX ORDER — NALOXONE HYDROCHLORIDE 0.4 MG/ML
.1-.4 INJECTION, SOLUTION INTRAMUSCULAR; INTRAVENOUS; SUBCUTANEOUS
Status: ACTIVE | OUTPATIENT
Start: 2018-03-22 | End: 2018-03-23

## 2018-03-22 RX ORDER — FENTANYL CITRATE 0.05 MG/ML
25-50 INJECTION, SOLUTION INTRAMUSCULAR; INTRAVENOUS
Status: DISCONTINUED | OUTPATIENT
Start: 2018-03-22 | End: 2018-03-22 | Stop reason: HOSPADM

## 2018-03-22 RX ORDER — PROPOFOL 10 MG/ML
INJECTION, EMULSION INTRAVENOUS PRN
Status: DISCONTINUED | OUTPATIENT
Start: 2018-03-22 | End: 2018-03-22

## 2018-03-22 RX ADMIN — FOLIC ACID: 5 INJECTION, SOLUTION INTRAMUSCULAR; INTRAVENOUS; SUBCUTANEOUS at 20:38

## 2018-03-22 RX ADMIN — LORAZEPAM 2 MG: 2 INJECTION INTRAMUSCULAR; INTRAVENOUS at 01:58

## 2018-03-22 RX ADMIN — ALBUTEROL SULFATE 2.5 MG: 2.5 SOLUTION RESPIRATORY (INHALATION) at 11:28

## 2018-03-22 RX ADMIN — SODIUM CHLORIDE, POTASSIUM CHLORIDE, SODIUM LACTATE AND CALCIUM CHLORIDE: 600; 310; 30; 20 INJECTION, SOLUTION INTRAVENOUS at 15:03

## 2018-03-22 RX ADMIN — SUCCINYLCHOLINE CHLORIDE 100 MG: 20 INJECTION, SOLUTION INTRAMUSCULAR; INTRAVENOUS; PARENTERAL at 15:10

## 2018-03-22 RX ADMIN — PHENYLEPHRINE HYDROCHLORIDE 100 MCG: 10 INJECTION INTRAVENOUS at 15:26

## 2018-03-22 RX ADMIN — LORAZEPAM 1 MG: 2 INJECTION INTRAMUSCULAR; INTRAVENOUS at 06:02

## 2018-03-22 RX ADMIN — ALBUTEROL SULFATE 2.5 MG: 2.5 SOLUTION RESPIRATORY (INHALATION) at 02:05

## 2018-03-22 RX ADMIN — SODIUM CHLORIDE, POTASSIUM CHLORIDE, SODIUM LACTATE AND CALCIUM CHLORIDE: 600; 310; 30; 20 INJECTION, SOLUTION INTRAVENOUS at 13:46

## 2018-03-22 RX ADMIN — FLUTICASONE FUROATE AND VILANTEROL TRIFENATATE 1 PUFF: 200; 25 POWDER RESPIRATORY (INHALATION) at 10:52

## 2018-03-22 RX ADMIN — ALBUTEROL SULFATE 2.5 MG: 2.5 SOLUTION RESPIRATORY (INHALATION) at 07:56

## 2018-03-22 RX ADMIN — PROPOFOL 120 MG: 10 INJECTION, EMULSION INTRAVENOUS at 15:10

## 2018-03-22 RX ADMIN — DEXTROSE AND SODIUM CHLORIDE: 5; 900 INJECTION, SOLUTION INTRAVENOUS at 18:50

## 2018-03-22 RX ADMIN — NICOTINE 1 PATCH: 21 PATCH, EXTENDED RELEASE TRANSDERMAL at 09:26

## 2018-03-22 RX ADMIN — DEXAMETHASONE SODIUM PHOSPHATE 4 MG: 4 INJECTION, SOLUTION INTRA-ARTICULAR; INTRALESIONAL; INTRAMUSCULAR; INTRAVENOUS; SOFT TISSUE at 15:21

## 2018-03-22 RX ADMIN — GABAPENTIN 400 MG: 100 CAPSULE ORAL at 23:53

## 2018-03-22 RX ADMIN — OXYCODONE HYDROCHLORIDE 5 MG: 5 TABLET ORAL at 10:50

## 2018-03-22 RX ADMIN — LORAZEPAM 1 MG: 2 INJECTION INTRAMUSCULAR; INTRAVENOUS at 00:35

## 2018-03-22 RX ADMIN — ONDANSETRON 4 MG: 2 INJECTION INTRAMUSCULAR; INTRAVENOUS at 15:21

## 2018-03-22 RX ADMIN — LORAZEPAM 1 MG: 2 INJECTION INTRAMUSCULAR; INTRAVENOUS at 03:08

## 2018-03-22 RX ADMIN — OXYCODONE HYDROCHLORIDE 5 MG: 5 TABLET ORAL at 09:31

## 2018-03-22 RX ADMIN — PANTOPRAZOLE SODIUM 40 MG: 40 INJECTION, POWDER, FOR SOLUTION INTRAVENOUS at 09:19

## 2018-03-22 RX ADMIN — GABAPENTIN 400 MG: 100 CAPSULE ORAL at 09:19

## 2018-03-22 RX ADMIN — LIDOCAINE HYDROCHLORIDE 60 MG: 20 INJECTION, SOLUTION INFILTRATION; PERINEURAL at 15:10

## 2018-03-22 RX ADMIN — LORAZEPAM 2 MG: 2 INJECTION INTRAMUSCULAR; INTRAVENOUS at 04:16

## 2018-03-22 RX ADMIN — PANTOPRAZOLE SODIUM 40 MG: 40 INJECTION, POWDER, FOR SOLUTION INTRAVENOUS at 20:38

## 2018-03-22 RX ADMIN — HYDROMORPHONE HYDROCHLORIDE 0.2 MG: 1 INJECTION, SOLUTION INTRAMUSCULAR; INTRAVENOUS; SUBCUTANEOUS at 00:35

## 2018-03-22 ASSESSMENT — PAIN DESCRIPTION - DESCRIPTORS
DESCRIPTORS: HEADACHE
DESCRIPTORS: ACHING

## 2018-03-22 ASSESSMENT — COPD QUESTIONNAIRES: COPD: 1

## 2018-03-22 ASSESSMENT — ENCOUNTER SYMPTOMS: SEIZURES: 0

## 2018-03-22 NOTE — OR NURSING
Patient wore watch to pre op. Placed watch in plastic container to give back to patient in recovery room.

## 2018-03-22 NOTE — ANESTHESIA CARE TRANSFER NOTE
Patient: Jim Richard    Procedure(s):  ESOPHAGOSCOPY, GASTROSCOPY, DUODENOSOCPY. - Wound Class: II-Clean Contaminated    Diagnosis: HEMATEMESIS.  Diagnosis Additional Information: No value filed.    Anesthesia Type:   General, ETT, RSI     Note:  Airway :Face Mask  Patient transferred to:PACU  Comments: Neuromuscular blockade not used after succinylcholine for intubation, spontaneous return of TOF 4/4 with sustained tetany, spontaneous respirations, adequate tidal volumes, followed commands to voice, oropharynx suctioned with soft flexible catheter, extubated atraumatically, extubated with suction, airway patent after extubation.  Oxygen via facemask at 6 liters per minute to PACU. Oxygen tubing connected to wall O2 in PACU, SpO2, NiBP, and EKG monitors and alarms on and functioning, Oscar Hugger warmer connected to patient gown, report on patient's clinical status given to PACU RN. Handoff Report: Identifed the Patient, Identified the Reponsible Provider, Reviewed the pertinent medical history, Discussed the surgical course, Reviewed Intra-OP anesthesia mangement and issues during anesthesia, Set expectations for post-procedure period and Allowed opportunity for questions and acknowledgement of understanding      Vitals: (Last set prior to Anesthesia Care Transfer)    CRNA VITALS  3/22/2018 1505 - 3/22/2018 1543      3/22/2018             Pulse: 86    SpO2: 96 %                Electronically Signed By: MADISON Wiggins CRNA  March 22, 2018  3:43 PM

## 2018-03-22 NOTE — OR NURSING
Pt assisted to w/c and transferred to 633.  Two assists needed to transfer to bed, ambulation weak and unsteady.

## 2018-03-22 NOTE — H&P
Admitted:     03/21/2018      PRIMARY CARE PROVIDER:  Dr. Talon Elias, [facility].      CHIEF COMPLAINT:  Hematemesis.      HISTORY OF PRESENT ILLNESS:  Mr. Jim Richard is a 63-year-old  gentleman with a past medical history notable for alcohol dependence, active tobacco use, COPD, peripheral artery disease, hypertension, dyslipidemia, depression/anxiety, BPH, chronic back pain/spinal stenosis, JANIS, and umbilical hernia, who has presented to the Emergency Department via EMS for evaluation of hematemesis.  He states he was in his usual state of health until earlier today when he started vomiting a large amount of blood, which he quantifies as 1 pint, associated with generalized weakness, shortness of breath and excessive sweating.  The patient has no previous history of GI bleed and he reports no prior history of endoscopy.  On direct questioning, he denies chest pain, palpitations, abdominal pain, melena, hematochezia or urinary symptoms.  He states his appetite is well maintained and he reports no change in his weight recently.  He also denies edema in his lower extremities.      In the Emergency Department, the patient has been evaluated by Dr. Danae Obregon, the ER attending physician.  The hematology profile shows hemoglobin of 7.4, platelet count of 102,000, INR of 1.31.  His hemoglobin was 14.8 back in 08/2017.  The chemistry profile is significant for a slightly low sodium level of 130, abnormal liver function with ALT of 74, AST of 136 and total bilirubin of 0.9.        On direct questioning, he states that he drinks 1 pint of vodka every day.  His ethanol level in the ER is less than 0.01.  The CT scan of the head with contrast shows no acute intracranial pathology.  Chest x-ray is essentially unremarkable.  The electrocardiogram reveals normal sinus rhythm and rate of 97 beats per minute and right bundle branch block.  Consent has been obtained in the Emergency Department and he is  being transfused with 1 unit of packed red blood cells.  I failed to mention that in the ER, his blood pressure was initially 100/64, heart rate 98 and he was saturating 98% on room air.      PAST MEDICAL HISTORY:   1.  Alcohol dependence with previous inpatient chemical dependency treatment x 5, according to the patient.  He continues to drink daily.   2.  Active tobacco use.     3.  Obstructive sleep apnea, he does use CPAP at night.   4.  Chronic back pain/spinal stenosis.   5.  Peripheral vascular disease, status post bypass surgery in 2012.   6.  COPD.   7.  BPH.   8.  Umbilical hernia.   9.  Hypertension.   10.  Dyslipidemia.   11.  Depression/anxiety.   12.  Normal nuclear cardiac stress test and echo with normal LV ejection fraction of 60% in 2014.      PAST SURGICAL HISTORY:   1.  Appendectomy.   2.  Tonsillectomy and adenoidectomy.   3.  Left external iliac artery to common femoral bifurcation and left femoral to above-knee popliteal bypass graft in 2012.   4.  Femoral artery endarterectomy.   5.  L3 to S1 laminectomy in .   6.  Right eye cataract surgery.      FAMILY HISTORY:  Father with lung cancer, mother with lymphoma and CHF.  Father  at age of 94.      SOCIAL HISTORY:  The patient is single.  He ambulates without any assistive devices.  He has 40-pack-year history of smoking.  He currently smokes 1 pack a day.  He continues to drink alcohol, 1 pint of vodka daily.      MEDICATIONS:    Prior to Admission Medications   Prescriptions Last Dose Informant Patient Reported? Taking?   ASPIRIN EC PO 3/20/2018 at Unknown time Self Yes Yes   Sig: Take 325-650 mg by mouth 2 times daily as needed for moderate pain (Headache)    ATORVASTATIN CALCIUM PO 3/20/2018 at pm Pharmacy Yes Yes   Sig: Take 10 mg by mouth At Bedtime    DULoxetine (CYMBALTA) 60 MG EC capsule 3/21/2018 at am Pharmacy No Yes   Sig: Take 1 capsule (60 mg) by mouth daily   GABAPENTIN PO Past Month at Unknown time Pharmacy Yes  Yes   Sig: Take 900 mg by mouth 3 times daily as needed   HYDROXYZINE HCL PO 3/21/2018 at Unknown time Pharmacy Yes Yes   Sig: Take 50 mg by mouth 3 times daily as needed for other (anxiety)    IBUPROFEN PO 3/21/2018 at am Self Yes Yes   Sig: Take 200-400 mg by mouth daily as needed for moderate pain   MELOXICAM PO Past Month at Unknown time Pharmacy Yes Yes   Sig: Take 15 mg by mouth daily as needed   MIRTAZAPINE PO 3/20/2018 at hs Pharmacy Yes Yes   Sig: Take 30 mg by mouth At Bedtime   QUETIAPINE FUMARATE PO prn med Pharmacy Yes Yes   Sig: Take 50 mg by mouth 3 times daily as needed   TAMSULOSIN HCL PO 3/21/2018 at am Pharmacy Yes Yes   Sig: Take 0.4 mg by mouth daily   TRAZODONE HCL PO 3/20/2018 at hs Pharmacy Yes Yes   Sig: Take 200 mg by mouth At Bedtime (Takes 2 x 100mg for a total of 200mg at bedtime)   albuterol (PROAIR HFA/PROVENTIL HFA/VENTOLIN HFA) 108 (90 BASE) MCG/ACT Inhaler prn med Pharmacy Yes Yes   Sig: Inhale 2 puffs into the lungs every 6 hours as needed    fluticasone-vilanterol (BREO ELLIPTA) 200-25 MCG/INH oral inhaler prn med Pharmacy Yes Yes   Sig: Inhale 1 puff into the lungs daily as needed    prednisoLONE acetate (PRED FORTE) 1 % ophthalmic susp 3/21/2018 at am x 1 dose Pharmacy Yes Yes   Sig: Place 1 drop into the right eye 2 times daily X 7 more days. Post cataract   spironolactone-hydrochlorothiazide (ALDACTAZIDE) 25-25 MG per tablet 3/21/2018 at am Pharmacy Yes Yes   Sig: Take 1 tablet by mouth daily      Facility-Administered Medications: None        ALLERGIES AND INTOLERANCES:  NONE.      REVIEW OF SYSTEMS:  A 10-point review of system was performed thoroughly and was negative except as stated in history of present illness.      PHYSICAL EXAMINATION:   GENERAL:  This patient is a very pleasant gentleman who is in no acute distress.   VITAL SIGNS:  Blood pressure of 100/64, heart rate 97, respiration rate 22, temperature 98.5 degrees Fahrenheit, oxygen saturation 98% on room air.    HEENT:  The pupils are round, equal, reactive to light bilaterally.  There is moderate conjunctival pallor.  The sclerae are anicteric.  The oral mucosa appears moist.   NECK:  Supple.  There is no elevated JVP.   LUNGS:  Bilateral wheezing diffusely.   CARDIOVASCULAR:  There is normal S1 and S2 with regular rate and rhythm.  There is a slight tachycardia.  I could not appreciate any murmur.   ABDOMEN:  Full, obese, distended.  There is hepatomegaly with the liver edge approximately 4 cm below the costal margin at midline.  I did not see caput medusae.   EXTREMITIES:  There is no calf tenderness, joint swelling or lower extremity edema.   SKIN:  There is no jaundice, cyanosis or acute rash.   NEUROLOGIC:  He is awake, alert, oriented x 3.  There is no focal deficit on gross examination.      LABORATORY DATA:   1.  Chemistry profile:  Sodium 130, potassium 3.9, BUN 46, creatinine 0.96, calcium 7.7, albumin 2.9, total protein 6, total bilirubin 0.9, alkaline phosphatase was 172, ALT 74, , lipase 316, glucose 155.   2.  Hematology profile:  White count 6, hemoglobin 7.4, hematocrit 22.9, platelet count 102,000, MCV 78.  Differential 79.8% neutrophils.   3.  Coagulation profile:  INR 1.31.      ASSESSMENT AND PLAN:  Mr. Jim Richard is a 63-year-old  gentleman with a past medical history notable for alcohol dependence, active tobacco use, hypertension, dyslipidemia, obstructive sleep apnea, BPH, peripheral artery disease, COPD, and depression/anxiety, who has presented to the Emergency Department via EMS for evaluation of a large amount of hematemesis.  He is being admitted to Intensive Care Unit.     1.  Acute upper gastrointestinal bleed with acute blood loss anemia:  The patient has presented with a large amount of hematemesis, suggestive of upper GI bleed.  He has a longstanding history of alcohol abuse, which does raise the possibility of portal hypertension with complications including  esophageal varices.  He does also use ibuprofen regularly. The liver function panel shows AST of 136, ALT of 74 and total bilirubin of 0.9 consistent with alcoholic liver disease.  His INR is 1.31, platelet count 102 and hemoglobin 7.4.  Notably, his hemoglobin and platelet counts were 14.8 and 189,000 respectively on 07/10/2017.   A.  Admit to ICU.   B.  Strict n.p.o.   C.  IV Protonix 40 mg b.i.d.   D.  Octreotide 50 mcg per hour.   E.  GI consult (the case has been communicated to on-call Gastroenterology, Dr. Pruitt, and plan for EGD urgently).   F.  Monitor hemoglobin every 6 hours.   G.  Conditional transfusions for hemoglobin less than 8.  He is currently being transfused with 1 unit of packed red blood cells.   H.  Avoid NSAIDs and anticoagulants.   I.  Hold prior to admission aspirin.   2.  Alcoholic liver disease:  The liver function panel shows elevated AST of 136, ALT of 74 and alkaline phosphatase of 172, but normal total bilirubin of 0.6.  Albumin level is 2.9.  He has coagulopathy with INR of 1.31.  Further evaluation including ultrasound of the hepatobiliary system is indicated once his condition is stabilized.  Management will be deferred to GI Service.   3.  Thrombocytopenia:  Platelet count is 102,000.  It was previously normal in 07/2017.  I suspect it is due to alcoholic liver disease.  His platelet count will be monitored.  At this juncture, there is no indication for platelet transfusion.   4.  Peripheral arterial disease:  The patient is status post left external iliac artery to common femoral bifurcation and left femoral to above-knee popliteal bypass graft as well as femoral artery endarterectomy in 06/2012.  Due to upper GI bleed, I will hold his prior to admission aspirin.  Until p.o. resumes, I will hold his prior to admission atorvastatin.   5.  Chronic obstructive pulmonary disease:  The patient is on p.o. Ellipta 1 puff daily as needed as well as albuterol every 6 hours as needed.   On examination, he has diffuse wheezing.  I will order albuterol neb every 6 hours as well as p.r.n.  His respiratory status will be closely monitored.   6.  Depression/anxiety:  At home he is on Cymbalta 60 mg p.o. daily and Remeron 30 mg p.o. at bedtime and trazodone 200 mg p.o. at bedtime.  Due to n.p.o. status, I will hold his prior to admission psychiatric medications.   7.  Hypertension:  His blood pressure is currently running soft due to GI bleed.  At home he is on Aldactazide 25-25 mg p.o. daily, which will be held.  I will have IV labetalol available for systolic blood pressure more than 170.   8.  Dyslipidemia:  Due to n.p.o. status, I will hold his prior to admission atorvastatin.   9.  Obstructive sleep apnea (treated with CPAP):  I will order CPAP at night per home setting.   10.  Benign prostatic hypertrophy:  His prior to admission tamsulosin 0.4 mg p.o. daily will be resumed once p.o. is allowed.   11.  Alcohol dependence:  The patient continues to drink alcohol heavily, 1 pint of vodka daily.  He is at risk of withdrawal.  I will place the patient on CIWA protocol and have p.r.n. Ativan available for his symptom trigger treatment.  I will also place on IV multivitamins including thiamine and folate.  I will order D5 normal saline at a rate of 75 mL per hour.  Chemical dependency counselor will be consulted.     12.  Tobacco abuse:  He currently smokes 1 pack a day.  I will order nicotine patch.   13.  Deep vein thrombosis prophylaxis:  Only mechanical with pneumatic compression device.   14.  Code status:  It was discussed and a FULL CODE was established.   15.  Disposition:  He will be admitted to the Intensive Care Unit given his critical condition.  I anticipate more than 48 hours of hospital stay.      I would like to thank Dr. Elias for allowing the Hospitalist Service to participate in the care of this patient.      I spent approximately 60 minutes of critical time for initial  assessment, admission to the ICU, and coordinating the care.         BETH APPLE MD             D: 2018   T: 2018   MT: JARROD      Name:     DEVONTE SEGURA   MRN:      [id number]        Account:      [id number]   :      1955        Admitted:     2018                   Document: [id number]       cc: Talon Elias MD

## 2018-03-22 NOTE — PLAN OF CARE
Problem: Patient Care Overview  Goal: Plan of Care/Patient Progress Review  Outcome: No Change  VSS. Pt is lethargic, forgetful, anxious and restless at times. 1-2 mg given approx q 1-2 hours per CIWA. C/o neck pain, prn dilaudid and ice given. Pt did not tolerate cpap overnight, placed on 2L nasal cannula. No bleeding noted overnight. Abd distended, voiding in urinal, passing gas. 2 units PRBC's given. Daughter updated. Likely EGD today.

## 2018-03-22 NOTE — ANESTHESIA POSTPROCEDURE EVALUATION
Patient: Jim Richard    Procedure(s):  ESOPHAGOSCOPY, GASTROSCOPY, DUODENOSOCPY. - Wound Class: II-Clean Contaminated    Diagnosis:HEMATEMESIS.  Diagnosis Additional Information: No value filed.    Anesthesia Type:  General, ETT, RSI    Note:  Anesthesia Post Evaluation    Patient location during evaluation: PACU  Patient participation: Able to fully participate in evaluation  Level of consciousness: awake, awake and alert and responsive to verbal stimuli  Pain management: adequate  Airway patency: patent  Cardiovascular status: acceptable  Respiratory status: acceptable  Hydration status: acceptable  PONV: none     Anesthetic complications: None          Last vitals:  Vitals:    03/22/18 1550 03/22/18 1600 03/22/18 1610   BP: 118/77 115/71 117/69   Pulse:      Resp: 15 19 15   Temp:      SpO2: 96% 98% 95%         Electronically Signed By: Adalgisa Mendoza  March 22, 2018  4:27 PM

## 2018-03-22 NOTE — PROGRESS NOTES
Brief EGD procedure not--see complete Provation report.    EGD findings:    Small esophageal varix--no red stefania signs, highly unlikely to be the source of his large blood loss.  No Jessica-Martinez tear seen.  No Dieulafoy or AVMs,  No ulcers.  There is mild erosive gastritis and mild erosive duodenitis.    There is no old nor fresh blood seen in this portion of the GI tract.    Plan:  Stop IV octreotide.  Change to po bid pantoprazole.  OK to advance the diet.  Would hold on further GI testing, such as colonoscopy (required prior to SB Pillcam, and which he needs by virtue of age for polyp screening but is not the site of bleeding with his hematemesis history), until he has completed alcohol withdrawal treatment.  This can be done as an outpatient.  Follow hgbs.  OK to advance the diet.    Valeri Pruitt MD  [facility]  Office: [phone]  Cell:  [phone]  Monday through Thursday 8am to 5pm, Friday 8am to 4pm

## 2018-03-22 NOTE — ANESTHESIA PREPROCEDURE EVALUATION
Anesthesia Evaluation     . Pt has had prior anesthetic.     No history of anesthetic complications          ROS/MED HX    ENT/Pulmonary:     (+)sleep apnea, COPD, uses CPAP , . .    Neurologic:      (-) seizures and CVA   Cardiovascular:     (+) Dyslipidemia, hypertension-Peripheral Vascular Disease-CAD, --. : . . . :. .       METS/Exercise Tolerance:     Hematologic:     (+) Anemia, -      Musculoskeletal:         GI/Hepatic:     (+) liver disease (ETOH abuse),      (-) GERD   Renal/Genitourinary:      Chronic renal disease: Crt 1.12.   Endo:      (-) Type II DM and thyroid disease   Psychiatric:     (+) psychiatric history anxiety and depression      Infectious Disease:         Malignancy:         Other:    (+) H/O Chronic Pain,                   Physical Exam  Normal systems: cardiovascular and pulmonary    Airway   Mallampati: III  TM distance: >3 FB  Neck ROM: full    Dental   (+) caps    Cardiovascular       Pulmonary                     Anesthesia Plan      History & Physical Review  History and physical reviewed and following examination; no interval change.    ASA Status:  3 .        Plan for General, ETT and RSI with Intravenous induction. Maintenance will be Inhalation.    PONV prophylaxis:  Ondansetron (or other 5HT-3) and Dexamethasone or Solumedrol  Additional equipment: Videolaryngoscope      Postoperative Care  Postoperative pain management:  IV analgesics.      Consents  Anesthetic plan, risks, benefits and alternatives discussed with:  Patient..        Procedure: Procedure(s):  COMBINED ESOPHAGOSCOPY, GASTROSCOPY, DUODENOSCOPY (EGD)  Preop diagnosis: HEMATEMESIS.    No Known Allergies  Past Medical History:   Diagnosis Date     Alcoholism (H) 1/14/2013    had treatment at [facility]     Anxiety      Coronary artery disease      Depression     w/anxiety     Heart attack      Hepatomegaly      Hyperlipemia      Hypertension      Peripheral vascular disease (H)      PVD (peripheral vascular  disease) (H)      Sleep apnea      Spinal stenosis      Past Surgical History:   Procedure Laterality Date     APPENDECTOMY       BYPASS GRAFT FEMOROPOPLITEAL  6/12/2012    Procedure: BYPASS GRAFT FEMOROPOPLITEAL;  LEFT FEMORAL TO ABOVE KNEE POPLITEAL BYPASS, ENDARTERECTOMY OF SFA AND PF ARTERIES, LEFT EXTERNAL ILIAC TO COMMON FEMORAL INTERPOSITION GRAFT;  Surgeon: Damir Roberts MD;  Location:  OR     COLONOSCOPY       ENDARTERECTOMY FEMORAL  6/12/2012    Procedure: ENDARTERECTOMY FEMORAL;;  Surgeon: Damir Roberts MD;  Location: [facility] OR     HERNIA REPAIR  2017    Abdominal     LAMINECTOMY, FUSION LUMBAR THREE+ LEVEL, COMBINED N/A 9/22/2014    Procedure: COMBINED LAMINECTOMY, FUSION LUMBAR THREE+ LEVEL;  Surgeon: Sebastien Pruitt MD;  Location:  OR     TONSILLECTOMY & ADENOIDECTOMY       Prior to Admission medications    Medication Sig Start Date End Date Taking? Authorizing Provider   MIRTAZAPINE PO Take 30 mg by mouth At Bedtime   Yes Unknown, Entered By History   MELOXICAM PO Take 15 mg by mouth daily as needed   Yes Unknown, Entered By History   IBUPROFEN PO Take 200-400 mg by mouth daily as needed for moderate pain   Yes Unknown, Entered By History   GABAPENTIN PO Take 900 mg by mouth 3 times daily as needed   Yes Unknown, Entered By History   prednisoLONE acetate (PRED FORTE) 1 % ophthalmic susp Place 1 drop into the right eye 2 times daily X 7 more days. Post cataract   Yes Unknown, Entered By History   ASPIRIN EC PO Take 325-650 mg by mouth 2 times daily as needed for moderate pain (Headache)    Yes Unknown, Entered By History   TAMSULOSIN HCL PO Take 0.4 mg by mouth daily   Yes Unknown, Entered By History   fluticasone-vilanterol (BREO ELLIPTA) 200-25 MCG/INH oral inhaler Inhale 1 puff into the lungs daily as needed    Yes Unknown, Entered By History   albuterol (PROAIR HFA/PROVENTIL HFA/VENTOLIN HFA) 108 (90 BASE) MCG/ACT Inhaler Inhale 2 puffs into the lungs every 6 hours as needed    Yes  Unknown, Entered By History   DULoxetine (CYMBALTA) 60 MG EC capsule Take 1 capsule (60 mg) by mouth daily 2/11/17  Yes Bordy Rand MD   ATORVASTATIN CALCIUM PO Take 10 mg by mouth At Bedtime    Yes Unknown, Entered By History   spironolactone-hydrochlorothiazide (ALDACTAZIDE) 25-25 MG per tablet Take 1 tablet by mouth daily   Yes Unknown, Entered By History   QUETIAPINE FUMARATE PO Take 50 mg by mouth 3 times daily as needed   Yes Unknown, Entered By History   TRAZODONE HCL PO Take 200 mg by mouth At Bedtime (Takes 2 x 100mg for a total of 200mg at bedtime)   Yes Unknown, Entered By History   HYDROXYZINE HCL PO Take 50 mg by mouth 3 times daily as needed for other (anxiety)    Yes Reported, Patient     Current Facility-Administered Medications Ordered in Epic   Medication Dose Route Frequency Last Rate Last Dose     [Auto Hold] gabapentin (NEURONTIN) capsule 400 mg  400 mg Oral Q8H   400 mg at 03/22/18 0919     lidocaine 1 % 1 mL  1 mL Other Q1H PRN         lactated ringers infusion   Intravenous Continuous 25 mL/hr at 03/22/18 1346       [Auto Hold] fluticasone-vilanterol (BREO ELLIPTA) 200-25 MCG/INH oral inhaler 1 puff  1 puff Inhalation Daily   1 puff at 03/22/18 1052     [Auto Hold] naloxone (NARCAN) injection 0.1-0.4 mg  0.1-0.4 mg Intravenous Q2 Min PRN         [Auto Hold] 0.9% sodium chloride BOLUS  500 mL Intravenous Once PRN         [Auto Hold] ondansetron (ZOFRAN-ODT) ODT tab 4 mg  4 mg Oral Q6H PRN        Or     [Auto Hold] ondansetron (ZOFRAN) injection 4 mg  4 mg Intravenous Q6H PRN   4 mg at 03/21/18 1939     [Auto Hold] prochlorperazine (COMPAZINE) injection 10 mg  10 mg Intravenous Q6H PRN        Or     [Auto Hold] prochlorperazine (COMPAZINE) tablet 10 mg  10 mg Oral Q6H PRN        Or     [Auto Hold] prochlorperazine (COMPAZINE) Suppository 25 mg  25 mg Rectal Q12H PRN         [Auto Hold] bisacodyl (DULCOLAX) Suppository 10 mg  10 mg Rectal Daily PRN         [Auto Hold] polyethylene  glycol (MIRALAX/GLYCOLAX) Packet 17 g  17 g Oral Daily PRN         [Auto Hold] oxyCODONE IR (ROXICODONE) tablet 5-10 mg  5-10 mg Oral Q3H PRN   5 mg at 03/22/18 1050     [Auto Hold] HYDROmorphone (PF) (DILAUDID) injection 0.2 mg  0.2 mg Intravenous Q2H PRN   0.2 mg at 03/22/18 0035     [Auto Hold] pantoprazole (PROTONIX) 40 mg IV push injection  40 mg Intravenous BID   40 mg at 03/22/18 0919     octreotide (sandoSTATIN) 1,250 mcg in sodium chloride 0.9 % 250 mL  50 mcg/hr Intravenous Continuous 10 mL/hr at 03/22/18 0929 50 mcg/hr at 03/22/18 0929     [Auto Hold] potassium chloride SA (K-DUR/KLOR-CON M) CR tablet 20-40 mEq  20-40 mEq Oral Q2H PRN         [Auto Hold] potassium chloride (KLOR-CON) Packet 20-40 mEq  20-40 mEq Oral or Feeding Tube Q2H PRN         [Auto Hold] potassium chloride 10 mEq in 100 mL sterile water intermittent infusion (premix)  10 mEq Intravenous Q1H PRN         [Auto Hold] potassium chloride 10 mEq in 100 mL intermittent infusion with 10 mg lidocaine  10 mEq Intravenous Q1H PRN         [Auto Hold] potassium chloride 20 mEq in 50 mL intermittent infusion  20 mEq Intravenous Q1H PRN         [Auto Hold] sodium chloride 0.9 % 1,000 mL with INFUVITE 10 mL, thiamine 100 mg, folic acid 1 mg infusion   Intravenous Q24H   Stopped at 03/22/18 0800     [Auto Hold] labetalol (NORMODYNE/TRANDATE) injection 10 mg  10 mg Intravenous Q2H PRN         dextrose 5% and 0.9% NaCl infusion   Intravenous Continuous 75 mL/hr at 03/22/18 0928       [Auto Hold] nicotine Patch in Place   Transdermal Q8H         [Auto Hold] nicotine patch REMOVAL   Transdermal Daily         [Auto Hold] nicotine (NICODERM CQ) 21 MG/24HR 24 hr patch 1 patch  1 patch Transdermal Daily   1 patch at 03/22/18 0926     [Auto Hold] albuterol neb solution 2.5 mg  2.5 mg Nebulization Q6H   2.5 mg at 03/22/18 1128     [Auto Hold] albuterol (PROVENTIL) neb solution 2.5 mg  2.5 mg Nebulization Q2H PRN         [Auto Hold] LORazepam (ATIVAN) tablet  1-2 mg  1-2 mg Oral Q30 Min PRN        Or     [Auto Hold] LORazepam (ATIVAN) injection 1-2 mg  1-2 mg Intravenous Q30 Min PRN   1 mg at 03/22/18 0602     Self Administer Medications: Behavioral Services   Does not apply See Admin Instructions         No current Epic-ordered outpatient prescriptions on file.     Wt Readings from Last 1 Encounters:   03/22/18 90.2 kg (198 lb 13.7 oz)     Temp Readings from Last 1 Encounters:   03/22/18 36.7  C (98.1  F) (Oral)     BP Readings from Last 6 Encounters:   03/22/18 125/81   12/21/17 185/83   12/21/17 153/78   02/10/17 159/87   10/10/16 152/89   01/25/16 133/84     Pulse Readings from Last 4 Encounters:   03/21/18 86   12/21/17 66   02/10/17 64   10/10/16 74     Resp Readings from Last 1 Encounters:   03/22/18 13     SpO2 Readings from Last 1 Encounters:   03/22/18 97%     Recent Labs   Lab Test  03/22/18   0430  03/21/18   1555   NA  135  130*   POTASSIUM  3.8  3.9   CHLORIDE  102  94   CO2  26  25   ANIONGAP  7  11   GLC  122*  155*   BUN  40*  46*   CR  1.12  0.96   KEV  7.3*  7.7*     Recent Labs   Lab Test  03/22/18   0953  03/22/18   0430   03/21/18   1555   WBC   --   4.7   --   6.0   HGB  8.3*  8.3*   < >  7.4*   PLT   --   115*   --   102*    < > = values in this interval not displayed.     Recent Labs   Lab Test  03/22/18   0430  03/21/18   1555   INR  1.34*  1.31*      RECENT LABS:   ECG:   ECHO:   CXR:                      .

## 2018-03-22 NOTE — PROGRESS NOTES
[facility] Note    Chart reviewed, events noted  Pt seen. Starting to go through withdrawals  No signs of bleeding overnight  Will plan for EGD today in the OR with MAC  If pt starts withdrawing more and requires intubation/sedation pt can be done in ICU  Further recommendation to follow    Jude Artis PA-C  [facility]  [phone] Cell ([phone])  [phone] Office (after 1300)

## 2018-03-22 NOTE — CONSULTS
3/22/18 cd consult acknowledged.  Per EMR, patient has Medicare and would need to seek chem dep services from [facility] program as they are only Medicare approved facility for chem dep treatment services. Social work can assist with resource and referral.

## 2018-03-22 NOTE — PROGRESS NOTES
[facility]  Hospitalist Progress Note        Piterdaryn Sequeira, DO  03/22/2018        Interval History:      Patient updated on plan of care in addition to nursing, verbalized understanding.          Assessment and Plan:        63-year-old  gentleman with a past medical history notable for alcohol dependence, active tobacco use, hypertension, dyslipidemia, obstructive sleep apnea, BPH, peripheral artery disease, COPD, depression/anxiety, who has presented to the Emergency Department via EMS for evaluation of a large amount of hematemesis.  He is being admitted to Intensive Care Unit.       Acute upper gastrointestinal bleed:  The patient has presented with a large amount of hematemesis, suggestive of upper GI bleed.  He has a longstanding history of alcohol abuse, which does raise the possibility of portal hypertension with complication including esophageal varices.  The liver function panel shows AST of 136, ALT of 74 and total bilirubin of 0.9 consistent with alcoholic liver disease.  His INR is 1.31, platelet count 102 and hemoglobin 7.4.  Notably, his hemoglobin and platelet counts were 14.8 and 189,000 on 07/10/2017.   - NPO.   - IV Protonix 40 mg b.i.d.   - Octreotide 50 mcg per hour.   - GI consulted  - Monitor hemoglobin   - Condition transfusions for hemoglobin less than 8.   - Avoid NSAIDs and anticoagulants/antiplatelet agents.     Alcoholic liver disease:  The liver function panel on admission shows elevated AST of 136, ALT of 74 and alkaline phosphatase of 172, but normal total bilirubin of 0.6.  Albumin level was 2.9.  He has coagulopathy with INR of 1.31 on admission.    - Further evaluation including ultrasound of the hepatobiliary system is indicated once his condition is stabilized.   - Management will be deferred to GI Service.     Acute blood loss anemia due to upper GI bleed:  Management as above.   - Monitor.     Thrombocytopenia:  Platelet count is 102,000 on  admission.  It was previously normal in 07/2017.  suspect it is due to alcoholic liver disease.   - Monitor.     Peripheral arterial disease:  The patient is status post left external iliac artery to common femoral bifurcation and left femoral to above-knee popliteal bypass graft as well as femoral artery endarterectomy in 06/2012.    - Due to GI bleed, hold his prior to admission aspirin.    - Until p.o. resumes, hold his prior to admission atorvastatin.     Chronic obstructive pulmonary disease:  The patient is on p.o. Ellipta 1 puff daily as needed as well as albuterol every 6 hours as needed.    - albuterol neb every 6 hours as well as p.r.n.      Depression/anxiety:  At home he is on Cymbalta 60 mg p.o. daily and Remeron 30 mg p.o. at bedtime and trazodone 200 mg p.o. at bedtime.    - Due to history of n.p.o. status, hold his prior to admission psychiatric medications.     Hypertension:  At home he is on Aldactazide 25/25 mg p.o. Daily.   - Hold Aldactazide.   - IV labetalol available for systolic blood pressure more than 170.     Dyslipidemia: Monitor.  - Hold his prior to admission atorvastatin.     Obstructive sleep apnea: CPAP at night with home setting.     Benign prostatic hypertrophy:  Stable.   - prior to admission tamsulosin 0.4 mg p.o. daily will be resumed once p.o. is allowed.     Alcohol dependence:  The patient continues to drink alcohol heavily, 1 pint of vodka daily.  He is at risk of withdrawal.    - [facility] protocol and have p.r.n. Ativan available for his symptom trigger treatment.   - IV multivitamins including thiamine and folate.    - D5 normal saline at a rate of 75 mL per hour.    - Dependency counselor will be consulted.       Tobacco abuse:  He currently smokes 1 pack a day.    - nicotine patch.     Deep vein thrombosis prophylaxis:  On the mechanical pneumatic compression device.     Code: Full.     Disposition: Consider transfer out of ICU if stable and ok with GI.     Pain: # Pain  "Assessment:   Current Pain Score 3/22/2018 3/22/2018 3/22/2018   Patient currently in pain? denies yes yes   Pain score (0-10) - - 5   Pain location - Neck Neck   Pain descriptors - - Alcides gutierrez pain level was assessed and he currently denies pain.                     Physical Exam:      Heart Rate: 70    Blood pressure 116/55, pulse 86, temperature 98.1  F (36.7  C), temperature source Oral, resp. rate 23, height 1.702 m (5' 7\"), weight 90.2 kg (198 lb 13.7 oz), SpO2 96 %.    Vitals:    18 1531 18 1900 18 0400   Weight: 93 kg (205 lb) 90.1 kg (198 lb 10.2 oz) 90.2 kg (198 lb 13.7 oz)       Vital Sign Ranges  Temperature Temp  Av.7  F (37.1  C)  Min: 98.1  F (36.7  C)  Max: 99.3  F (37.4  C)   Blood pressure Systolic (24hrs), Av , Min:99 , Max:146        Diastolic (24hrs), Av, Min:38, Max:107      Pulse Pulse  Av.5  Min: 86  Max: 95   Respirations Resp  Av.3  Min: 8  Max: 45   Pulse oximetry SpO2  Av.6 %  Min: 91 %  Max: 100 %     Vital Signs with Ranges  Temp:  [98.1  F (36.7  C)-99.3  F (37.4  C)] 98.1  F (36.7  C)  Pulse:  [86-95] 86  Heart Rate:  [] 70  Resp:  [8-45] 23  BP: ()/() 116/55  SpO2:  [91 %-100 %] 96 %    I/O Last 3 Shifts:   I/O last 3 completed shifts:  In: 2812.41 [I.V.:1212.41; IV Piggyback:1000]  Out: 1375 [Urine:1375]    I/O past 24 hours:     Intake/Output Summary (Last 24 hours) at 18 0823  Last data filed at 18 0817   Gross per 24 hour   Intake          2812.41 ml   Output             1575 ml   Net          1237.41 ml     GENERAL: Alert and oriented. NAD. Conversational, appropriate.   HEENT: Normocephalic. EOMI. No icterus or injection. Nares normal.   LUNGS: Wheezing. No dyspnea at rest.   HEART: Regular rate. Extremities perfused.   ABDOMEN: Obese. Soft, nontender, and nondistended. Positive bowel sounds. Hepatomegaly present.   EXTREMITIES: No LE edema noted.   NEUROLOGIC: Moves extremities x4 on command. " No acute focal neurologic abnormalities noted.          Prior to Admission Medications:        Prescriptions Prior to Admission   Medication Sig Dispense Refill Last Dose     MIRTAZAPINE PO Take 30 mg by mouth At Bedtime   3/20/2018 at hs     MELOXICAM PO Take 15 mg by mouth daily as needed   Past Month at Unknown time     IBUPROFEN PO Take 200-400 mg by mouth daily as needed for moderate pain   3/21/2018 at am     GABAPENTIN PO Take 900 mg by mouth 3 times daily as needed   Past Month at Unknown time     prednisoLONE acetate (PRED FORTE) 1 % ophthalmic susp Place 1 drop into the right eye 2 times daily X 7 more days. Post cataract   3/21/2018 at am x 1 dose     ASPIRIN EC PO Take 325-650 mg by mouth 2 times daily as needed for moderate pain (Headache)    3/20/2018 at Unknown time     TAMSULOSIN HCL PO Take 0.4 mg by mouth daily   3/21/2018 at am     fluticasone-vilanterol (BREO ELLIPTA) 200-25 MCG/INH oral inhaler Inhale 1 puff into the lungs daily as needed    prn med     albuterol (PROAIR HFA/PROVENTIL HFA/VENTOLIN HFA) 108 (90 BASE) MCG/ACT Inhaler Inhale 2 puffs into the lungs every 6 hours as needed    prn med     DULoxetine (CYMBALTA) 60 MG EC capsule Take 1 capsule (60 mg) by mouth daily 30 capsule 0 3/21/2018 at am     ATORVASTATIN CALCIUM PO Take 10 mg by mouth At Bedtime    3/20/2018 at pm     spironolactone-hydrochlorothiazide (ALDACTAZIDE) 25-25 MG per tablet Take 1 tablet by mouth daily   3/21/2018 at am     QUETIAPINE FUMARATE PO Take 50 mg by mouth 3 times daily as needed   prn med     TRAZODONE HCL PO Take 200 mg by mouth At Bedtime (Takes 2 x 100mg for a total of 200mg at bedtime)   3/20/2018 at hs     HYDROXYZINE HCL PO Take 50 mg by mouth 3 times daily as needed for other (anxiety)    3/21/2018 at Unknown time            Medications:        Current Facility-Administered Medications   Medication Last Rate     octreotide (sandoSTATIN) infusion ADULT 50 mcg/hr (03/21/18 1949)     dextrose 5% and  0.9% NaCl 10 mL/hr at 03/21/18 2100     Current Facility-Administered Medications   Medication Dose Route Frequency     gabapentin  400 mg Oral Q8H     fluticasone-vilanterol  1 puff Inhalation Daily     pantoprazole (PROTONIX) IV  40 mg Intravenous BID     IV Fluid with vitamins   Intravenous Q24H     nicotine   Transdermal Q8H     nicotine   Transdermal Daily     nicotine  1 patch Transdermal Daily     albuterol  2.5 mg Nebulization Q6H     Current Facility-Administered Medications   Medication Dose Route Frequency     naloxone  0.1-0.4 mg Intravenous Q2 Min PRN     sodium chloride 0.9%  500 mL Intravenous Once PRN     ondansetron  4 mg Oral Q6H PRN    Or     ondansetron  4 mg Intravenous Q6H PRN     prochlorperazine  10 mg Intravenous Q6H PRN    Or     prochlorperazine  10 mg Oral Q6H PRN    Or     prochlorperazine  25 mg Rectal Q12H PRN     bisacodyl  10 mg Rectal Daily PRN     polyethylene glycol  17 g Oral Daily PRN     oxyCODONE IR  5-10 mg Oral Q3H PRN     HYDROmorphone  0.2 mg Intravenous Q2H PRN     potassium chloride  20-40 mEq Oral Q2H PRN     potassium chloride  20-40 mEq Oral or Feeding Tube Q2H PRN     potassium chloride  10 mEq Intravenous Q1H PRN     potassium chloride with lidocaine  10 mEq Intravenous Q1H PRN     potassium chloride  20 mEq Intravenous Q1H PRN     labetalol  10 mg Intravenous Q2H PRN     albuterol  2.5 mg Nebulization Q2H PRN     LORazepam  1-2 mg Oral Q30 Min PRN    Or     LORazepam  1-2 mg Intravenous Q30 Min PRN            Data:      Lab data reviewed.     Recent Labs  Lab 03/22/18  0430  03/21/18  1555   HGB 8.3*  < > 7.4*   MCV 79  --  78   *  --  102*   INR 1.34*  --  1.31*     --  130*   POTASSIUM 3.8  --  3.9   CHLORIDE 102  --  94   CO2 26  --  25   BUN 40*  --  46*   CR 1.12  --  0.96   ANIONGAP 7  --  11   KEV 7.3*  --  7.7*   *  --  155*   < > = values in this interval not displayed.        Imaging:      Imaging data reviewed.     Dr. Dumas  MART Sequeira.  [facility] [facility] Hospitalist  Pager [phone]

## 2018-03-22 NOTE — PROGRESS NOTES
Pt placed on CPAP +12 21% for JANIS. Nebs started and will cont to administer Q6 as ordered.     3/22/2018  Elle Golden RRT

## 2018-03-23 ENCOUNTER — APPOINTMENT (OUTPATIENT)
Dept: ULTRASOUND IMAGING | Facility: CLINIC | Age: 63
DRG: 441 | End: 2018-03-23
Attending: PHYSICIAN ASSISTANT
Payer: MEDICARE

## 2018-03-23 LAB
AFP SERPL-MCNC: 3.3 UG/L (ref 0–8)
ALBUMIN SERPL-MCNC: 2.8 G/DL (ref 3.4–5)
ALP SERPL-CCNC: 128 U/L (ref 40–150)
ALT SERPL W P-5'-P-CCNC: 70 U/L (ref 0–70)
ANION GAP SERPL CALCULATED.3IONS-SCNC: 6 MMOL/L (ref 3–14)
AST SERPL W P-5'-P-CCNC: 118 U/L (ref 0–45)
BILIRUB SERPL-MCNC: 0.6 MG/DL (ref 0.2–1.3)
BUN SERPL-MCNC: 23 MG/DL (ref 7–30)
CALCIUM SERPL-MCNC: 7.5 MG/DL (ref 8.5–10.1)
CHLORIDE SERPL-SCNC: 106 MMOL/L (ref 94–109)
CO2 SERPL-SCNC: 25 MMOL/L (ref 20–32)
CREAT SERPL-MCNC: 1.04 MG/DL (ref 0.66–1.25)
ERYTHROCYTE [DISTWIDTH] IN BLOOD BY AUTOMATED COUNT: 18.3 % (ref 10–15)
GFR SERPL CREATININE-BSD FRML MDRD: 72 ML/MIN/1.7M2
GLUCOSE SERPL-MCNC: 138 MG/DL (ref 70–99)
HBV CORE AB SERPL QL IA: NONREACTIVE
HBV SURFACE AB SERPL IA-ACNC: 0.42 M[IU]/ML
HBV SURFACE AG SERPL QL IA: NONREACTIVE
HCT VFR BLD AUTO: 25.3 % (ref 40–53)
HGB BLD-MCNC: 8.2 G/DL (ref 13.3–17.7)
MCH RBC QN AUTO: 26.2 PG (ref 26.5–33)
MCHC RBC AUTO-ENTMCNC: 32.4 G/DL (ref 31.5–36.5)
MCV RBC AUTO: 81 FL (ref 78–100)
PLATELET # BLD AUTO: 112 10E9/L (ref 150–450)
POTASSIUM SERPL-SCNC: 4.2 MMOL/L (ref 3.4–5.3)
PROT SERPL-MCNC: 5.5 G/DL (ref 6.8–8.8)
RBC # BLD AUTO: 3.13 10E12/L (ref 4.4–5.9)
SODIUM SERPL-SCNC: 137 MMOL/L (ref 133–144)
WBC # BLD AUTO: 6 10E9/L (ref 4–11)

## 2018-03-23 PROCEDURE — A9270 NON-COVERED ITEM OR SERVICE: HCPCS | Mod: GY | Performed by: HOSPITALIST

## 2018-03-23 PROCEDURE — A9270 NON-COVERED ITEM OR SERVICE: HCPCS | Mod: GY | Performed by: INTERNAL MEDICINE

## 2018-03-23 PROCEDURE — 82105 ALPHA-FETOPROTEIN SERUM: CPT | Performed by: PHYSICIAN ASSISTANT

## 2018-03-23 PROCEDURE — 12000000 ZZH R&B MED SURG/OB

## 2018-03-23 PROCEDURE — 25000132 ZZH RX MED GY IP 250 OP 250 PS 637: Mod: GY | Performed by: INTERNAL MEDICINE

## 2018-03-23 PROCEDURE — 25000125 ZZHC RX 250: Performed by: INTERNAL MEDICINE

## 2018-03-23 PROCEDURE — 25000132 ZZH RX MED GY IP 250 OP 250 PS 637: Mod: GY | Performed by: NURSE PRACTITIONER

## 2018-03-23 PROCEDURE — 25000132 ZZH RX MED GY IP 250 OP 250 PS 637: Mod: GY | Performed by: PHYSICIAN ASSISTANT

## 2018-03-23 PROCEDURE — G0499 HEPB SCREEN HIGH RISK INDIV: HCPCS | Performed by: PHYSICIAN ASSISTANT

## 2018-03-23 PROCEDURE — 94640 AIRWAY INHALATION TREATMENT: CPT | Mod: 76

## 2018-03-23 PROCEDURE — 86704 HEP B CORE ANTIBODY TOTAL: CPT | Performed by: PHYSICIAN ASSISTANT

## 2018-03-23 PROCEDURE — 99232 SBSQ HOSP IP/OBS MODERATE 35: CPT | Performed by: HOSPITALIST

## 2018-03-23 PROCEDURE — 40000275 ZZH STATISTIC RCP TIME EA 10 MIN

## 2018-03-23 PROCEDURE — 85027 COMPLETE CBC AUTOMATED: CPT | Performed by: HOSPITALIST

## 2018-03-23 PROCEDURE — 25000128 H RX IP 250 OP 636: Performed by: INTERNAL MEDICINE

## 2018-03-23 PROCEDURE — 80053 COMPREHEN METABOLIC PANEL: CPT | Performed by: HOSPITALIST

## 2018-03-23 PROCEDURE — 36415 COLL VENOUS BLD VENIPUNCTURE: CPT | Performed by: PHYSICIAN ASSISTANT

## 2018-03-23 PROCEDURE — 25000132 ZZH RX MED GY IP 250 OP 250 PS 637: Mod: GY | Performed by: HOSPITALIST

## 2018-03-23 PROCEDURE — 86706 HEP B SURFACE ANTIBODY: CPT | Performed by: PHYSICIAN ASSISTANT

## 2018-03-23 PROCEDURE — 36415 COLL VENOUS BLD VENIPUNCTURE: CPT | Performed by: HOSPITALIST

## 2018-03-23 PROCEDURE — 76705 ECHO EXAM OF ABDOMEN: CPT

## 2018-03-23 PROCEDURE — 85018 HEMOGLOBIN: CPT | Performed by: HOSPITALIST

## 2018-03-23 PROCEDURE — 94640 AIRWAY INHALATION TREATMENT: CPT

## 2018-03-23 PROCEDURE — A9270 NON-COVERED ITEM OR SERVICE: HCPCS | Mod: GY | Performed by: PHYSICIAN ASSISTANT

## 2018-03-23 PROCEDURE — A9270 NON-COVERED ITEM OR SERVICE: HCPCS | Mod: GY | Performed by: NURSE PRACTITIONER

## 2018-03-23 RX ORDER — ACETAMINOPHEN 325 MG/1
650 TABLET ORAL EVERY 4 HOURS PRN
Status: DISCONTINUED | OUTPATIENT
Start: 2018-03-23 | End: 2018-03-24 | Stop reason: HOSPADM

## 2018-03-23 RX ORDER — ACETAMINOPHEN 650 MG/1
650 SUPPOSITORY RECTAL EVERY 4 HOURS PRN
Status: DISCONTINUED | OUTPATIENT
Start: 2018-03-23 | End: 2018-03-24 | Stop reason: HOSPADM

## 2018-03-23 RX ORDER — OXYCODONE HYDROCHLORIDE 5 MG/1
5 TABLET ORAL EVERY 8 HOURS PRN
Status: DISCONTINUED | OUTPATIENT
Start: 2018-03-23 | End: 2018-03-24 | Stop reason: HOSPADM

## 2018-03-23 RX ADMIN — FLUTICASONE FUROATE AND VILANTEROL TRIFENATATE 1 PUFF: 200; 25 POWDER RESPIRATORY (INHALATION) at 09:03

## 2018-03-23 RX ADMIN — GABAPENTIN 400 MG: 100 CAPSULE ORAL at 09:02

## 2018-03-23 RX ADMIN — LORAZEPAM 1 MG: 1 TABLET ORAL at 09:12

## 2018-03-23 RX ADMIN — POLYETHYLENE GLYCOL 3350 17 G: 17 POWDER, FOR SOLUTION ORAL at 19:17

## 2018-03-23 RX ADMIN — PANTOPRAZOLE SODIUM 40 MG: 40 INJECTION, POWDER, FOR SOLUTION INTRAVENOUS at 09:03

## 2018-03-23 RX ADMIN — ACETAMINOPHEN 650 MG: 325 TABLET, FILM COATED ORAL at 12:04

## 2018-03-23 RX ADMIN — GABAPENTIN 400 MG: 100 CAPSULE ORAL at 16:25

## 2018-03-23 RX ADMIN — ALBUTEROL SULFATE 2.5 MG: 2.5 SOLUTION RESPIRATORY (INHALATION) at 12:33

## 2018-03-23 RX ADMIN — ALBUTEROL SULFATE 2.5 MG: 2.5 SOLUTION RESPIRATORY (INHALATION) at 19:33

## 2018-03-23 RX ADMIN — OXYCODONE HYDROCHLORIDE 5 MG: 5 TABLET ORAL at 14:35

## 2018-03-23 RX ADMIN — ALBUTEROL SULFATE 2.5 MG: 2.5 SOLUTION RESPIRATORY (INHALATION) at 07:58

## 2018-03-23 RX ADMIN — FOLIC ACID: 5 INJECTION, SOLUTION INTRAMUSCULAR; INTRAVENOUS; SUBCUTANEOUS at 20:49

## 2018-03-23 RX ADMIN — ACETAMINOPHEN 650 MG: 325 TABLET, FILM COATED ORAL at 19:15

## 2018-03-23 RX ADMIN — NICOTINE 1 PATCH: 21 PATCH, EXTENDED RELEASE TRANSDERMAL at 09:02

## 2018-03-23 RX ADMIN — DEXTROSE AND SODIUM CHLORIDE: 5; 900 INJECTION, SOLUTION INTRAVENOUS at 07:08

## 2018-03-23 ASSESSMENT — ACTIVITIES OF DAILY LIVING (ADL)
AMBULATION: 0-->INDEPENDENT
SWALLOWING: 0-->SWALLOWS FOODS/LIQUIDS WITHOUT DIFFICULTY
RETIRED_COMMUNICATION: 0-->UNDERSTANDS/COMMUNICATES WITHOUT DIFFICULTY
BATHING: 0-->INDEPENDENT
DRESS: 0-->INDEPENDENT
TRANSFERRING: 0-->INDEPENDENT
FALL_HISTORY_WITHIN_LAST_SIX_MONTHS: YES
TOILETING: 0-->INDEPENDENT
COGNITION: 0 - NO COGNITION ISSUES REPORTED
NUMBER_OF_TIMES_PATIENT_HAS_FALLEN_WITHIN_LAST_SIX_MONTHS: 1
RETIRED_EATING: 0-->INDEPENDENT

## 2018-03-23 NOTE — PLAN OF CARE
Problem: Patient Care Overview  Goal: Plan of Care/Patient Progress Review  Outcome: No Change  Pt A&O x3/4, up w/ strong assist of 2, no c/o of pain, pt arrived to floor from ENDO, per GI pt can start reg diet, stopped octreocide drip, started on D5 NS @ 75/hr. CD consults in place. CIWA 2. D/c pending

## 2018-03-23 NOTE — PROGRESS NOTES
"[facility]  [facility]  Gastroenterology Progress note    Interval History:      Patient reports he feels OK.  No questions today.        Vital Signs:      /70 (BP Location: Right arm)  Pulse 86  Temp 98.2  F (36.8  C) (Oral)  Resp 20  Ht 1.702 m (5' 7\")  Wt 91.1 kg (200 lb 13.4 oz)  SpO2 96%  BMI 31.46 kg/m2  Temp (24hrs), Av.3  F (36.8  C), Min:97.5  F (36.4  C), Max:99.3  F (37.4  C)    Patient Vitals for the past 72 hrs:   Weight   18 0706 91.1 kg (200 lb 13.4 oz)   18 0400 90.2 kg (198 lb 13.7 oz)   18 1900 90.1 kg (198 lb 10.2 oz)   18 1531 93 kg (205 lb)       Intake/Output Summary (Last 24 hours) at 18 0908  Last data filed at 18 0853   Gross per 24 hour   Intake           859.67 ml   Output             1875 ml   Net         -1015.33 ml         Constitutional: NAD, comfortable      Additional Comments:  ROS, FH, SH: See initial GI consult for details.    Laboratory Data:  Recent Labs   Lab Test  18   0425  18   2240  18   0953  18   0430   18   1555   10/07/16   0425   WBC  6.0   --    --   4.7   --   6.0   < >  7.2   HGB  8.2*  8.4*  8.3*  8.3*   < >  7.4*   < >  14.7   MCV  81   --    --   79   --   78   < >  91   PLT  112*   --    --   115*   --   102*   < >  174   INR   --    --    --   1.34*   --   1.31*   --   1.08    < > = values in this interval not displayed.     Recent Labs   Lab Test  18   0425  18   0430  18   1555   NA  137  135  130*   POTASSIUM  4.2  3.8  3.9   CHLORIDE  106  102  94   CO2  25  26  25   BUN  23  40*  46*   CR  1.04  1.12  0.96   ANIONGAP  6  7  11   KEV  7.5*  7.3*  7.7*     Recent Labs   Lab Test  18   0425  18   0430  18   1555   01/12/15   2305  14   0510   12   0550   ALBUMIN  2.8*  2.8*  2.9*   < >  3.2*   --    < >   --    BILITOTAL  0.6  1.0  0.9   < >  1.9*   --    < >   --    DBIL   --    --    --    " --   0.7*   --    --    --    ALT  70  63  74*   < >  43   --    < >   --    AST  118*  114*  136*   < >  65*   --    < >   --    ALKPHOS  128  135  172*   < >  132   --    < >   --    PROTEIN   --    --    --    --    --   Negative   --   Negative   LIPASE   --    --   316   --    --    --    --    --     < > = values in this interval not displayed.         Assessment:    1. Hematemesis, resolved  2.  ETOH abuse, patient reports he will not drink anymore  3.  ETOH W/D  Plan:    -Stop IVF  -PPI BID x 4 weeks, then daily  -OK to d/c from a GI standpoint   -Follow up with EMERY Guzman on April 4th at 3:10 with Dr. Alena Massey, [facility]  Office:  [phone] call if needed after 5PM  Cell:  [phone], not available after 5PM at this number

## 2018-03-23 NOTE — PLAN OF CARE
Problem: Patient Care Overview  Goal: Plan of Care/Patient Progress Review  Outcome: No Change  Alert & oriented x 3, disoriented to time, denies pain, VSS on 3 liters oxygen, spo2 97%,ambulate to bathroom with 1assist/walker, CIWA 4, no bowel movement this shift, no sign of bleeding, abdomen rounded, CD consult,hgb 8.2, tele SR with BBB, nicotine patch on right deltiod.

## 2018-03-23 NOTE — PROGRESS NOTES
[facility]  Hospitalist Progress Note        Piterdaryn Sequeira, DO  03/23/2018        Interval History:      Patient tremulous.          Assessment and Plan:        63-year-old  gentleman with a past medical history notable for alcohol dependence, active tobacco use, hypertension, dyslipidemia, obstructive sleep apnea, BPH, peripheral artery disease, COPD, depression/anxiety, who has presented to the Emergency Department via EMS for evaluation of a large amount of hematemesis.       Acute upper gastrointestinal bleed with Non-bleeding grade I esophageal varices. Possible, limited Corrales's tissue in distal esophagus, not biopsied at this time, due to recent UGI bleeding. No sign of a Jessica-Martinez tear was seen. Gastritis. Duodenal erosions without bleeding:  The patient has presented with a large amount of hematemesis, suggestive of upper GI bleed.  He has a longstanding history of alcohol abuse, which does raise the possibility of portal hypertension with complication including esophageal varices.  The liver function panel shows AST of 136, ALT of 74 and total bilirubin of 0.9 consistent with alcoholic liver disease.  His INR is 1.31, platelet count 102 and hemoglobin 7.4.  Notably, his hemoglobin and platelet counts were 14.8 and 189,000 on 07/10/2017.   - ADAT.   - PPI BID.   - EGD completed 3/22, see report, as above.   - Condition transfusions for hemoglobin less than 7.   - Avoid NSAIDs and anticoagulants/antiplatelet agents.      Alcoholic liver disease:  The liver function panel on admission shows elevated AST of 136, ALT of 74 and alkaline phosphatase of 172, but normal total bilirubin of 0.6.  Albumin level was 2.9.  He has coagulopathy with INR of 1.31 on admission.    - Consider evaluation including ultrasound of the hepatobiliary system is indicated once his condition is stabilized.   - Management deferred to GI Service.      Acute blood loss anemia due to upper GI bleed:   Management as above.   - Monitor.      Thrombocytopenia:  Platelet count is 102,000 on admission.  It was previously normal in 07/2017.  suspect it is due to alcoholic liver disease.   - Monitor.      Peripheral arterial disease:  The patient is status post left external iliac artery to common femoral bifurcation and left femoral to above-knee popliteal bypass graft as well as femoral artery endarterectomy in 06/2012.    - hold his prior to admission aspirin.    - hold his prior to admission atorvastatin.      Chronic obstructive pulmonary disease:  The patient is on p.o. Ellipta 1 puff daily as needed as well as albuterol every 6 hours as needed.    - albuterol neb every 6 hours as well as p.r.n.       Depression/anxiety:  At home he is on Cymbalta 60 mg p.o. daily and Remeron 30 mg p.o. at bedtime and trazodone 200 mg p.o. at bedtime.    - Due to history of n.p.o. status, hold his prior to admission psychiatric medications.      Hypertension:  At home he is on Aldactazide 25/25 mg p.o. Daily.   - Hold Aldactazide.   - IV labetalol available for systolic blood pressure more than 170.      Dyslipidemia: Monitor.  - Hold his prior to admission atorvastatin.      Obstructive sleep apnea: CPAP at night with home setting.      Benign prostatic hypertrophy:  Stable.   - prior to admission tamsulosin 0.4 mg p.o. daily will be resumed once p.o. is allowed.      Alcohol dependence:  The patient continues to drink alcohol heavily, 1 pint of vodka daily.  He is at risk of withdrawal.    - [facility] protocol and have p.r.n. Ativan available for his symptom trigger treatment.   - IV multivitamins including thiamine and folate.       Tobacco abuse:  He currently smokes 1 pack a day.    - nicotine patch.      Deep vein thrombosis prophylaxis:  On the mechanical pneumatic compression device.      Code: Full.      Disposition: Discharge in next 1-2 days if stable.      Pain: # Pain Assessment:  Current Pain Score 3/22/2018   Patient  "currently in pain? denies   Pain score (0-10) -   Pain location -   Pain descriptors -   Jim gutierrez pain level was assessed and he currently denies pain.                     Physical Exam:      Heart Rate: 74    Blood pressure 125/70, pulse 86, temperature 98.2  F (36.8  C), temperature source Oral, resp. rate 20, height 1.702 m (5' 7\"), weight 91.1 kg (200 lb 13.4 oz), SpO2 96 %.    Vitals:    18 1900 18 0400 18 0706   Weight: 90.1 kg (198 lb 10.2 oz) 90.2 kg (198 lb 13.7 oz) 91.1 kg (200 lb 13.4 oz)       Vital Sign Ranges  Temperature Temp  Av.3  F (36.8  C)  Min: 97.5  F (36.4  C)  Max: 99.3  F (37.4  C)   Blood pressure Systolic (24hrs), Av , Min:115 , Max:151        Diastolic (24hrs), Av, Min:67, Max:88      Pulse No Data Recorded   Respirations Resp  Av.2  Min: 12  Max: 30   Pulse oximetry SpO2  Av.1 %  Min: 89 %  Max: 98 %     Vital Signs with Ranges  Temp:  [97.5  F (36.4  C)-99.3  F (37.4  C)] 98.2  F (36.8  C)  Heart Rate:  [71-90] 74  Resp:  [12-30] 20  BP: (115-151)/(67-88) 125/70  SpO2:  [89 %-98 %] 96 %    I/O Last 3 Shifts:   I/O last 3 completed shifts:  In: 859.67 [P.O.:50; I.V.:809.67]  Out: 1650 [Urine:1650]    I/O past 24 hours:     Intake/Output Summary (Last 24 hours) at 18 0900  Last data filed at 18 0853   Gross per 24 hour   Intake           859.67 ml   Output             1875 ml   Net         -1015.33 ml     GENERAL: Alert and oriented. NAD. Conversational, appropriate. Tremulous.   HEENT: Normocephalic. EOMI. No icterus or injection. Nares normal.   LUNGS: Clear. No dyspnea at rest.   HEART: Regular rate. Extremities perfused.   ABDOMEN: Obese. Soft, nontender, and nondistended. Positive bowel sounds. Hepatomegaly present.   EXTREMITIES: No LE edema noted.   NEUROLOGIC: Moves extremities x4 on command. No acute focal neurologic abnormalities noted.          Prior to Admission Medications:        Prescriptions Prior to Admission "   Medication Sig Dispense Refill Last Dose     MIRTAZAPINE PO Take 30 mg by mouth At Bedtime   3/20/2018 at hs     MELOXICAM PO Take 15 mg by mouth daily as needed   Past Month at Unknown time     IBUPROFEN PO Take 200-400 mg by mouth daily as needed for moderate pain   3/21/2018 at am     GABAPENTIN PO Take 900 mg by mouth 3 times daily as needed   Past Month at Unknown time     prednisoLONE acetate (PRED FORTE) 1 % ophthalmic susp Place 1 drop into the right eye 2 times daily X 7 more days. Post cataract   3/21/2018 at am x 1 dose     ASPIRIN EC PO Take 325-650 mg by mouth 2 times daily as needed for moderate pain (Headache)    3/20/2018 at Unknown time     TAMSULOSIN HCL PO Take 0.4 mg by mouth daily   3/21/2018 at am     fluticasone-vilanterol (BREO ELLIPTA) 200-25 MCG/INH oral inhaler Inhale 1 puff into the lungs daily as needed    prn med     albuterol (PROAIR HFA/PROVENTIL HFA/VENTOLIN HFA) 108 (90 BASE) MCG/ACT Inhaler Inhale 2 puffs into the lungs every 6 hours as needed    prn med     DULoxetine (CYMBALTA) 60 MG EC capsule Take 1 capsule (60 mg) by mouth daily 30 capsule 0 3/21/2018 at am     ATORVASTATIN CALCIUM PO Take 10 mg by mouth At Bedtime    3/20/2018 at pm     spironolactone-hydrochlorothiazide (ALDACTAZIDE) 25-25 MG per tablet Take 1 tablet by mouth daily   3/21/2018 at am     QUETIAPINE FUMARATE PO Take 50 mg by mouth 3 times daily as needed   prn med     TRAZODONE HCL PO Take 200 mg by mouth At Bedtime (Takes 2 x 100mg for a total of 200mg at bedtime)   3/20/2018 at hs     HYDROXYZINE HCL PO Take 50 mg by mouth 3 times daily as needed for other (anxiety)    3/21/2018 at Unknown time            Medications:        Current Facility-Administered Medications   Medication Last Rate     dextrose 5% and 0.9% NaCl 75 mL/hr at 03/23/18 0708     Current Facility-Administered Medications   Medication Dose Route Frequency     gabapentin  400 mg Oral Q8H     fluticasone-vilanterol  1 puff Inhalation Daily      pantoprazole (PROTONIX) IV  40 mg Intravenous BID     IV Fluid with vitamins   Intravenous Q24H     nicotine   Transdermal Q8H     nicotine   Transdermal Daily     nicotine  1 patch Transdermal Daily     albuterol  2.5 mg Nebulization Q6H     Current Facility-Administered Medications   Medication Dose Route Frequency     naloxone  0.1-0.4 mg Intravenous Q2 Min PRN     naloxone  0.1-0.4 mg Intravenous Q2 Min PRN     sodium chloride 0.9%  500 mL Intravenous Once PRN     ondansetron  4 mg Oral Q6H PRN    Or     ondansetron  4 mg Intravenous Q6H PRN     prochlorperazine  10 mg Intravenous Q6H PRN    Or     prochlorperazine  10 mg Oral Q6H PRN    Or     prochlorperazine  25 mg Rectal Q12H PRN     bisacodyl  10 mg Rectal Daily PRN     polyethylene glycol  17 g Oral Daily PRN     oxyCODONE IR  5-10 mg Oral Q3H PRN     HYDROmorphone  0.2 mg Intravenous Q2H PRN     potassium chloride  20-40 mEq Oral Q2H PRN     potassium chloride  20-40 mEq Oral or Feeding Tube Q2H PRN     potassium chloride  10 mEq Intravenous Q1H PRN     potassium chloride with lidocaine  10 mEq Intravenous Q1H PRN     potassium chloride  20 mEq Intravenous Q1H PRN     labetalol  10 mg Intravenous Q2H PRN     albuterol  2.5 mg Nebulization Q2H PRN     LORazepam  1-2 mg Oral Q30 Min PRN    Or     LORazepam  1-2 mg Intravenous Q30 Min PRN            Data:      Lab data reviewed.     Recent Labs  Lab 03/23/18  0425  03/22/18  0430  03/21/18  1555   HGB 8.2*  < > 8.3*  < > 7.4*   MCV 81  --  79  --  78   *  --  115*  --  102*   INR  --   --  1.34*  --  1.31*     --  135  --  130*   POTASSIUM 4.2  --  3.8  --  3.9   CHLORIDE 106  --  102  --  94   CO2 25  --  26  --  25   BUN 23  --  40*  --  46*   CR 1.04  --  1.12  --  0.96   ANIONGAP 6  --  7  --  11   KVE 7.5*  --  7.3*  --  7.7*   *  --  122*  --  155*   < > = values in this interval not displayed.        Imaging:      Imaging data reviewed.     NELL Wesley  [facility] Hospitalist  Pager [phone]

## 2018-03-23 NOTE — PLAN OF CARE
Problem: Patient Care Overview  Goal: Plan of Care/Patient Progress Review  Outcome: No Change  A&OX4, VSS on RA. Tele has been SR with BBB. C/o severe headache, received a dose of tylenol without relief, received a dose of PRN Roxicodone. Pain reassessment pending. CIWA was 10, received a dose of PO ativan and the recheck in an hour was 0 as pt was sleeping.  Latest CIWA was 6 for tremors and headache. IVF d/abhijeet this shift. 2 PIVs saline locked. Up with SBA. NPO except for meds in anticipation of US of the abdomen at 1700. BUE edematous +2. Last Hgb was 8.2 and next recheck in the AM. Continue to monitor. D/c pending progress.

## 2018-03-24 VITALS
OXYGEN SATURATION: 95 % | WEIGHT: 200.84 LBS | HEIGHT: 67 IN | RESPIRATION RATE: 18 BRPM | HEART RATE: 67 BPM | DIASTOLIC BLOOD PRESSURE: 68 MMHG | SYSTOLIC BLOOD PRESSURE: 151 MMHG | BODY MASS INDEX: 31.52 KG/M2 | TEMPERATURE: 98.7 F

## 2018-03-24 LAB
ALBUMIN SERPL-MCNC: 3 G/DL (ref 3.4–5)
ALP SERPL-CCNC: 153 U/L (ref 40–150)
ALT SERPL W P-5'-P-CCNC: 65 U/L (ref 0–70)
ANION GAP SERPL CALCULATED.3IONS-SCNC: 7 MMOL/L (ref 3–14)
AST SERPL W P-5'-P-CCNC: 81 U/L (ref 0–45)
BILIRUB SERPL-MCNC: 0.8 MG/DL (ref 0.2–1.3)
BUN SERPL-MCNC: 18 MG/DL (ref 7–30)
CALCIUM SERPL-MCNC: 8 MG/DL (ref 8.5–10.1)
CHLORIDE SERPL-SCNC: 104 MMOL/L (ref 94–109)
CO2 SERPL-SCNC: 25 MMOL/L (ref 20–32)
CREAT SERPL-MCNC: 0.99 MG/DL (ref 0.66–1.25)
ERYTHROCYTE [DISTWIDTH] IN BLOOD BY AUTOMATED COUNT: 19.3 % (ref 10–15)
GFR SERPL CREATININE-BSD FRML MDRD: 76 ML/MIN/1.7M2
GLUCOSE SERPL-MCNC: 104 MG/DL (ref 70–99)
HCT VFR BLD AUTO: 27.2 % (ref 40–53)
HGB BLD-MCNC: 8.7 G/DL (ref 13.3–17.7)
MCH RBC QN AUTO: 26.4 PG (ref 26.5–33)
MCHC RBC AUTO-ENTMCNC: 32 G/DL (ref 31.5–36.5)
MCV RBC AUTO: 82 FL (ref 78–100)
PLATELET # BLD AUTO: 126 10E9/L (ref 150–450)
POTASSIUM SERPL-SCNC: 3.5 MMOL/L (ref 3.4–5.3)
PROT SERPL-MCNC: 6.1 G/DL (ref 6.8–8.8)
RBC # BLD AUTO: 3.3 10E12/L (ref 4.4–5.9)
SODIUM SERPL-SCNC: 136 MMOL/L (ref 133–144)
WBC # BLD AUTO: 6.5 10E9/L (ref 4–11)

## 2018-03-24 PROCEDURE — 25000132 ZZH RX MED GY IP 250 OP 250 PS 637: Mod: GY | Performed by: INTERNAL MEDICINE

## 2018-03-24 PROCEDURE — 94640 AIRWAY INHALATION TREATMENT: CPT

## 2018-03-24 PROCEDURE — 40000275 ZZH STATISTIC RCP TIME EA 10 MIN

## 2018-03-24 PROCEDURE — 99239 HOSP IP/OBS DSCHRG MGMT >30: CPT | Performed by: HOSPITALIST

## 2018-03-24 PROCEDURE — 25000132 ZZH RX MED GY IP 250 OP 250 PS 637: Mod: GY | Performed by: NURSE PRACTITIONER

## 2018-03-24 PROCEDURE — 25000125 ZZHC RX 250: Performed by: INTERNAL MEDICINE

## 2018-03-24 PROCEDURE — 36415 COLL VENOUS BLD VENIPUNCTURE: CPT | Performed by: HOSPITALIST

## 2018-03-24 PROCEDURE — A9270 NON-COVERED ITEM OR SERVICE: HCPCS | Mod: GY | Performed by: INTERNAL MEDICINE

## 2018-03-24 PROCEDURE — A9270 NON-COVERED ITEM OR SERVICE: HCPCS | Mod: GY | Performed by: PHYSICIAN ASSISTANT

## 2018-03-24 PROCEDURE — 80053 COMPREHEN METABOLIC PANEL: CPT | Performed by: HOSPITALIST

## 2018-03-24 PROCEDURE — 25000132 ZZH RX MED GY IP 250 OP 250 PS 637: Mod: GY | Performed by: PHYSICIAN ASSISTANT

## 2018-03-24 PROCEDURE — 94640 AIRWAY INHALATION TREATMENT: CPT | Mod: 76

## 2018-03-24 PROCEDURE — A9270 NON-COVERED ITEM OR SERVICE: HCPCS | Mod: GY | Performed by: NURSE PRACTITIONER

## 2018-03-24 PROCEDURE — 40000893 ZZH STATISTIC PT IP EVAL DEFER

## 2018-03-24 PROCEDURE — 85027 COMPLETE CBC AUTOMATED: CPT | Performed by: HOSPITALIST

## 2018-03-24 RX ORDER — PANTOPRAZOLE SODIUM 40 MG/1
40 TABLET, DELAYED RELEASE ORAL
Status: DISCONTINUED | OUTPATIENT
Start: 2018-03-24 | End: 2018-03-24 | Stop reason: HOSPADM

## 2018-03-24 RX ORDER — OMEPRAZOLE 40 MG/1
40 CAPSULE, DELAYED RELEASE ORAL
Qty: 60 CAPSULE | Refills: 1 | Status: SHIPPED | OUTPATIENT
Start: 2018-03-24 | End: 2019-08-01

## 2018-03-24 RX ORDER — OMEPRAZOLE 40 MG/1
40 CAPSULE, DELAYED RELEASE ORAL DAILY
Qty: 30 CAPSULE | Refills: 1 | Status: SHIPPED | OUTPATIENT
Start: 2018-03-24 | End: 2018-03-24

## 2018-03-24 RX ADMIN — NICOTINE 1 PATCH: 21 PATCH, EXTENDED RELEASE TRANSDERMAL at 08:18

## 2018-03-24 RX ADMIN — GABAPENTIN 400 MG: 100 CAPSULE ORAL at 01:36

## 2018-03-24 RX ADMIN — ALBUTEROL SULFATE 2.5 MG: 2.5 SOLUTION RESPIRATORY (INHALATION) at 00:41

## 2018-03-24 RX ADMIN — GABAPENTIN 400 MG: 100 CAPSULE ORAL at 08:17

## 2018-03-24 RX ADMIN — ALBUTEROL SULFATE 2.5 MG: 2.5 SOLUTION RESPIRATORY (INHALATION) at 07:24

## 2018-03-24 RX ADMIN — OXYCODONE HYDROCHLORIDE 5 MG: 5 TABLET ORAL at 01:40

## 2018-03-24 RX ADMIN — FLUTICASONE FUROATE AND VILANTEROL TRIFENATATE 1 PUFF: 200; 25 POWDER RESPIRATORY (INHALATION) at 08:19

## 2018-03-24 NOTE — DISCHARGE SUMMARY
[facility]    Discharge Summary  Hospitalist    Date of Admission:  3/21/2018  Date of Discharge:  3/24/2018  Discharging Provider: Piter Sequeira  Date of Service (when I saw the patient): 03/24/18    History of Present Illness   Mr. Jim Richard is a 63-year-old  gentleman with a past medical history notable for alcohol dependence, active tobacco use, COPD, peripheral artery disease, hypertension, dyslipidemia, depression/anxiety, BPH, chronic back pain/spinal stenosis, JANIS, and umbilical hernia, who has presented to the Emergency Department via EMS for evaluation of hematemesis.  He states he was in his usual state of health until earlier today when he started vomiting a large amount of blood, which he quantifies as 1 pint, associated with generalized weakness, shortness of breath and excessive sweating.  The patient has no previous history of GI bleed and he reports no prior history of endoscopy.  On direct questioning, he denies chest pain, palpitations, abdominal pain, melena, hematochezia or urinary symptoms.  He states his appetite is well maintained and he reports no change in his weight recently.  He also denies edema in his lower extremities.       In the Emergency Department, the patient has been evaluated by Dr. Danae Obregon, the ER attending physician.  The hematology profile shows hemoglobin of 7.4, platelet count of 102,000, INR of 1.31.  His hemoglobin was 14.8 back in 08/2017.  The chemistry profile is significant for a slightly low sodium level of 130, abnormal liver function with ALT of 74, AST of 136 and total bilirubin of 0.9.         On direct questioning, he states that he drinks 1 pint of vodka every day.  His ethanol level in the ER is less than 0.01.  The CT scan of the head with contrast shows no acute intracranial pathology.  Chest x-ray is essentially unremarkable.  The electrocardiogram reveals normal sinus rhythm and rate of 97 beats per minute and  right bundle branch block.  Consent has been obtained in the Emergency Department and he is being transfused with 1 unit of packed red blood cells.  I failed to mention that in the ER, his blood pressure was initially 100/64, heart rate 98 and he was saturating 98% on room air.     Hospital Course   Jim Richard was admitted on 3/21/2018.  The following problems were addressed during his hospitalization:    63-year-old  gentleman with a past medical history notable for alcohol dependence, active tobacco use, hypertension, dyslipidemia, obstructive sleep apnea, BPH, peripheral artery disease, COPD, depression/anxiety, who has presented to the Emergency Department via EMS for evaluation of a large amount of hematemesis.        Acute upper gastrointestinal bleed with Non-bleeding grade I esophageal varices. Possible, limited Corrales's tissue in distal esophagus, not biopsied at this time, due to recent UGI bleeding. No sign of a Jessica-Martinez tear was seen. Gastritis. Duodenal erosions without bleeding:  The patient has presented with a large amount of hematemesis, suggestive of upper GI bleed.  He has a longstanding history of alcohol abuse, which does raise the possibility of portal hypertension with complication including esophageal varices.  The liver function panel shows AST of 136, ALT of 74 and total bilirubin of 0.9 consistent with alcoholic liver disease.  His INR is 1.31, platelet count 102 and hemoglobin 7.4.  Notably, his hemoglobin and platelet counts were 14.8 and 189,000 on 07/10/2017.   - PPI BID.   - EGD completed 3/22, see report, as above.   - Avoid NSAIDs and anticoagulants/antiplatelet agents.   - Educated patient at length to avoid NSAIDS and alcohol, verbalized understanding.       Alcoholic liver disease:  The liver function panel on admission shows elevated AST of 136, ALT of 74 and alkaline phosphatase of 172, but normal total bilirubin of 0.6.  Albumin level was 2.9.  He has  coagulopathy with INR of 1.31 on admission.    - Close outpatient follow up.   - Liver u/s completed, see report, discussed with patient.   - Management deferred to GI Service.       Acute blood loss anemia due to upper GI bleed:  Management as above.   - Monitor as outpatient. Stable.       Thrombocytopenia:  Platelet count is 102,000 on admission.  It was previously normal in 07/2017.  suspect it is due to alcoholic liver disease.   - Monitor closely as outpatient.        Peripheral arterial disease:  The patient is status post left external iliac artery to common femoral bifurcation and left femoral to above-knee popliteal bypass graft as well as femoral artery endarterectomy in 06/2012.    - Hold his prior to admission aspirin.    - prior to admission atorvastatin.       Chronic obstructive pulmonary disease:  The patient is on p.o. Ellipta 1 puff daily as needed as well as albuterol every 6 hours as needed.    - Continue home regimen. Stable.       Depression/anxiety:  At home he is on Cymbalta 60 mg p.o. daily and Remeron 30 mg p.o. at bedtime and trazodone 200 mg p.o. at bedtime.    - Resume outpatient management established previously.       Hypertension:  At home he is on Aldactazide 25/25 mg p.o. Daily.   - Aldactazide.       Dyslipidemia: Monitor.  - prior to admission atorvastatin.       Obstructive sleep apnea: CPAP QHS      Benign prostatic hypertrophy:  Stable.   - prior to admission tamsulosin       Alcohol dependence:  The patient continues to drink alcohol heavily, 1 pint of vodka daily.  He is at risk of withdrawal.    - Discussed at length alcohol cessation, patient verbalized understanding.       Tobacco abuse:  He currently smokes 1 pack a day.    - Cessation discussed patient verbalized understanding.     Code Status   Full Code       Primary Care Physician   [facility]    Physical Exam   Temp: 98.7  F (37.1  C) Temp src: Axillary BP: 151/68 Pulse: 67 Heart Rate: 76 Resp:  18 SpO2: 95 % O2 Device: None (Room air) Oxygen Delivery: 2 LPM  Vitals:    03/21/18 1900 03/22/18 0400 03/23/18 0706   Weight: 90.1 kg (198 lb 10.2 oz) 90.2 kg (198 lb 13.7 oz) 91.1 kg (200 lb 13.4 oz)     Vital Signs with Ranges  Temp:  [98.7  F (37.1  C)-99  F (37.2  C)] 98.7  F (37.1  C)  Pulse:  [67] 67  Heart Rate:  [67-76] 76  Resp:  [18-20] 18  BP: (124-151)/(68-73) 151/68  SpO2:  [91 %-96 %] 95 %  I/O last 3 completed shifts:  In: 480 [P.O.:480]  Out: 725 [Urine:725]    GENERAL: Alert and oriented. NAD. Conversational, appropriate. Much less tremulous, patient reports at baseline.   HEENT: Normocephalic. EOMI. No icterus or injection. Nares normal.   LUNGS: Clear. No dyspnea at rest.   HEART: Regular rate. Extremities perfused.   ABDOMEN: Obese. Soft, nontender, and nondistended. Positive bowel sounds. Hepatomegaly present.   EXTREMITIES: No LE edema noted.   NEUROLOGIC: Moves extremities x4 on command. No acute focal neurologic abnormalities noted.     Discharge Disposition   Discharged to home  Condition at discharge: Stable    Consultations This Hospital Stay   GASTROENTEROLOGY IP CONSULT  CHEMICAL DEPENDENCY IP CONSULT  PHYSICAL THERAPY ADULT IP CONSULT    Time Spent on this Encounter   I, Piter Sequeira, personally saw the patient today and spent greater than 30 minutes discharging this patient.    Discharge Orders     Reason for your hospital stay   GI bleeding.     Follow-up and recommended labs and tests    Follow up with primary care provider, [facility], within 7 days for hospital follow- up.  The following labs/tests are recommended: cbc/bmp.    Follow up with Gastroenterology as directed.     Activity   Your activity upon discharge: activity as tolerated     Full Code     Diet   Follow this diet upon discharge: Orders Placed This Encounter     Regular Diet Adult       Discharge Medications   Current Discharge Medication List      START taking these medications     Details   omeprazole (PRILOSEC) 40 MG capsule Take 1 capsule (40 mg) by mouth 2 times daily (before meals) Take 30-60 minutes before a meal.  Qty: 60 capsule, Refills: 1    Associated Diagnoses: Hematemesis without nausea         CONTINUE these medications which have NOT CHANGED    Details   MIRTAZAPINE PO Take 30 mg by mouth At Bedtime      GABAPENTIN PO Take 900 mg by mouth 3 times daily as needed      prednisoLONE acetate (PRED FORTE) 1 % ophthalmic susp Place 1 drop into the right eye 2 times daily X 7 more days. Post cataract      TAMSULOSIN HCL PO Take 0.4 mg by mouth daily      fluticasone-vilanterol (BREO ELLIPTA) 200-25 MCG/INH oral inhaler Inhale 1 puff into the lungs daily as needed       albuterol (PROAIR HFA/PROVENTIL HFA/VENTOLIN HFA) 108 (90 BASE) MCG/ACT Inhaler Inhale 2 puffs into the lungs every 6 hours as needed       DULoxetine (CYMBALTA) 60 MG EC capsule Take 1 capsule (60 mg) by mouth daily  Qty: 30 capsule, Refills: 0    Associated Diagnoses: 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)      ATORVASTATIN CALCIUM PO Take 10 mg by mouth At Bedtime       spironolactone-hydrochlorothiazide (ALDACTAZIDE) 25-25 MG per tablet Take 1 tablet by mouth daily      QUETIAPINE FUMARATE PO Take 50 mg by mouth 3 times daily as needed      TRAZODONE HCL PO Take 200 mg by mouth At Bedtime (Takes 2 x 100mg for a total of 200mg at bedtime)      HYDROXYZINE HCL PO Take 50 mg by mouth 3 times daily as needed for other (anxiety)          STOP taking these medications       MELOXICAM PO Comments:   Reason for Stopping:         IBUPROFEN PO Comments:   Reason for Stopping:         ASPIRIN EC PO Comments:   Reason for Stopping:             Allergies   No Known Allergies  Data   Most Recent 3 CBC's:  Recent Labs   Lab Test  03/24/18   0830  03/23/18   0425  03/22/18   2240   03/22/18   0430   WBC  6.5  6.0   --    --   4.7   HGB  8.7*  8.2*  8.4*   < >  8.3*   MCV  82  81   --    --   79   PLT  126*   112*   --    --   115*    < > = values in this interval not displayed.      Most Recent 3 BMP's:  Recent Labs   Lab Test  03/24/18   0830  03/23/18   0425  03/22/18   0430   NA  136  137  135   POTASSIUM  3.5  4.2  3.8   CHLORIDE  104  106  102   CO2  25  25  26   BUN  18  23  40*   CR  0.99  1.04  1.12   ANIONGAP  7  6  7   KEV  8.0*  7.5*  7.3*   GLC  104*  138*  122*     Most Recent 2 LFT's:  Recent Labs   Lab Test  03/24/18   0830  03/23/18   0425   AST  81*  118*   ALT  65  70   ALKPHOS  153*  128   BILITOTAL  0.8  0.6     Most Recent INR's and Anticoagulation Dosing History:  Anticoagulation Dose History     Recent Dosing and Labs Latest Ref Rng & Units 3/8/2012 6/12/2012 1/12/2015 10/7/2016 3/21/2018 3/22/2018    INR 0.86 - 1.14 1.09 1.03 1.08 1.08 1.31(H) 1.34(H)        Most Recent 3 Troponin's:No lab results found.  Most Recent Cholesterol Panel:  Recent Labs   Lab Test  06/12/12   0530   CHOL  198   LDL  81   HDL  95   TRIG  106     Most Recent 6 Bacteria Isolates From Any Culture (See EPIC Reports for Culture Details):  Recent Labs   Lab Test  10/08/16   1150   CULT  Moderate growth Normal trevin     Most Recent TSH, T4 and A1c Labs:  Recent Labs   Lab Test  03/22/18   0430  02/08/17   1640   TSH   --   1.51   A1C  Canceled, Test credited   --      Results for orders placed or performed during the hospital encounter of 03/21/18   Head CT w/o contrast    Narrative    CT SCAN OF THE HEAD WITHOUT CONTRAST   3/21/2018 4:33 PM     HISTORY: Alcohol use and fell last night, with left occipital  headache.    TECHNIQUE: Axial images of the head and coronal reformations without  IV contrast material. Radiation dose for this scan was reduced using  automated exposure control, adjustment of the mA and/or kV according  to patient size, or iterative reconstruction technique.    COMPARISON: 10/7/2016    FINDINGS: There is generalized atrophy of the brain, unchanged. There  is no evidence of intracranial hemorrhage, mass,  acute infarct or  anomaly.     The visualized portions of the sinuses and mastoids appear normal.  There is no evidence of trauma.      Impression    IMPRESSION:  1. No acute abnormality.  2. Brain atrophy, unchanged.      ALLISON RANGEL MD   Chest XR,  PA & LAT    Narrative    CHEST TWO VIEWS 3/21/2018 4:23 PM     HISTORY: Shortness of breath after vomiting, check for aspiration.    COMPARISON: 10/7/2016    FINDINGS: Heart size and pulmonary vascularity are within normal  limits. The lungs are clear. No pneumothorax or pleural effusion.       Impression    IMPRESSION: No radiographic evidence of acute chest abnormality.     ASHLEY CHEN MD   US Abdomen Limited    Narrative    US ABDOMEN LIMITED 3/23/2018 5:25 PM    CLINICAL INFORMATION: EtOH abuse, cirrhosis, rule out hepatocellular  carcinoma.    COMPARISON: CT 10/7/2016.    FINDINGS: Limited right upper quadrant ultrasound demonstrates a  negative gallbladder. No gallstones or gallbladder wall thickening. No  sonographic Head's sign. Gallbladder is mildly distended measuring  12.5 cm in length. Trace free fluid in the upper abdomen near the  gallbladder. No bile duct dilatation. Increased liver echogenicity.  Liver is difficult to penetrate and, therefore, not optimally seen.  Visualized portions of the  pancreas are negative. Tail of the  pancreas is obscured. The visualized portion of the right kidney is  unremarkable. No hydronephrosis on the right.      Impression    IMPRESSION:  1. Gallbladder is mildly distended without gallstones or gallbladder  wall thickening.  2. Trace nonspecific fluid in the right upper quadrant near the  gallbladder.  3. Fatty infiltration of the liver. No liver lesions identified,  although the liver is difficult to penetrate and, therefore, not well  seen. If clinically indicated, outpatient liver MRI could be  performed.    BENJI IBARRA MD

## 2018-03-24 NOTE — PLAN OF CARE
Problem: Patient Care Overview  Goal: Plan of Care/Patient Progress Review  PT:   Discharge Planner PT   Patient plan for discharge: Return home  Current status: Pt is now back to baseline, up with SBA and ambulated halls with nursing, was steady on his feet. Pt has no skilled inpatient PT needs at this time and safe to return home.  Barriers to return to prior living situation: None  Recommendations for discharge: Return home  Rationale for recommendations: No skilled inpatient PT needs at this time.       Entered by: Alena Patton 03/24/2018 8:50 AM

## 2018-03-24 NOTE — PLAN OF CARE
Problem: Patient Care Overview  Goal: Plan of Care/Patient Progress Review  Outcome: Improving  A&Ox4, VSS on RA. Stand by assist. Regular diet. Tele SR with BBB. CIWA 6 and 3 for headache and tremors. Pt states tylenol does not work for headache. Pain at 7/10,1 dose PRN oxycodone given with slight relief. Nicotine patch on. Will continue to monitor.

## 2018-03-24 NOTE — PLAN OF CARE
Problem: Patient Care Overview  Goal: Plan of Care/Patient Progress Review  Outcome: Improving  Afebrile, c/o headache-tylenol given. CIWA scores were 3-for headache and tremor. Pt requested ativan-none given per CIWA scale. Fall risk- up in the halls with a sba, walker and gait belt twice. Steady on feet. Sat in the chair. Nicotine patch on. Good appetite. Abd US results are pending. No signs of bleeding. Miralax given.

## 2018-04-21 ENCOUNTER — HOSPITAL ENCOUNTER (EMERGENCY)
Facility: CLINIC | Age: 63
Discharge: HOME | End: 2018-04-21
Attending: EMERGENCY MEDICINE | Admitting: EMERGENCY MEDICINE
Payer: MEDICARE

## 2018-04-21 ENCOUNTER — APPOINTMENT (OUTPATIENT)
Dept: CT IMAGING | Facility: CLINIC | Age: 63
End: 2018-04-21
Attending: EMERGENCY MEDICINE
Payer: MEDICARE

## 2018-04-21 VITALS
HEART RATE: 72 BPM | OXYGEN SATURATION: 96 % | HEIGHT: 66 IN | RESPIRATION RATE: 16 BRPM | DIASTOLIC BLOOD PRESSURE: 83 MMHG | SYSTOLIC BLOOD PRESSURE: 152 MMHG | TEMPERATURE: 98.5 F

## 2018-04-21 DIAGNOSIS — G89.29 CHRONIC NONINTRACTABLE HEADACHE, UNSPECIFIED HEADACHE TYPE: ICD-10-CM

## 2018-04-21 DIAGNOSIS — R51.9 CHRONIC NONINTRACTABLE HEADACHE, UNSPECIFIED HEADACHE TYPE: ICD-10-CM

## 2018-04-21 DIAGNOSIS — G47.00 INSOMNIA, UNSPECIFIED TYPE: ICD-10-CM

## 2018-04-21 DIAGNOSIS — F10.220 ACUTE ALCOHOLIC INTOXICATION IN ALCOHOLISM WITHOUT COMPLICATION (H): ICD-10-CM

## 2018-04-21 LAB
ALBUMIN SERPL-MCNC: 3.6 G/DL (ref 3.4–5)
ALP SERPL-CCNC: 278 U/L (ref 40–150)
ALT SERPL W P-5'-P-CCNC: 79 U/L (ref 0–70)
ANION GAP SERPL CALCULATED.3IONS-SCNC: 14 MMOL/L (ref 3–14)
AST SERPL W P-5'-P-CCNC: 149 U/L (ref 0–45)
BASOPHILS # BLD AUTO: 0.1 10E9/L (ref 0–0.2)
BASOPHILS NFR BLD AUTO: 1 %
BILIRUB SERPL-MCNC: 0.8 MG/DL (ref 0.2–1.3)
BUN SERPL-MCNC: 18 MG/DL (ref 7–30)
CALCIUM SERPL-MCNC: 8.2 MG/DL (ref 8.5–10.1)
CHLORIDE SERPL-SCNC: 101 MMOL/L (ref 94–109)
CO2 SERPL-SCNC: 24 MMOL/L (ref 20–32)
CREAT SERPL-MCNC: 0.98 MG/DL (ref 0.66–1.25)
DIFFERENTIAL METHOD BLD: ABNORMAL
EOSINOPHIL # BLD AUTO: 0.1 10E9/L (ref 0–0.7)
EOSINOPHIL NFR BLD AUTO: 1.2 %
ERYTHROCYTE [DISTWIDTH] IN BLOOD BY AUTOMATED COUNT: 19.3 % (ref 10–15)
ETHANOL SERPL-MCNC: 0.28 G/DL
GFR SERPL CREATININE-BSD FRML MDRD: 77 ML/MIN/1.7M2
GLUCOSE SERPL-MCNC: 88 MG/DL (ref 70–99)
HCT VFR BLD AUTO: 31 % (ref 40–53)
HGB BLD-MCNC: 9.8 G/DL (ref 13.3–17.7)
IMM GRANULOCYTES # BLD: 0 10E9/L (ref 0–0.4)
IMM GRANULOCYTES NFR BLD: 0.3 %
LYMPHOCYTES # BLD AUTO: 1.3 10E9/L (ref 0.8–5.3)
LYMPHOCYTES NFR BLD AUTO: 21.5 %
MCH RBC QN AUTO: 25.1 PG (ref 26.5–33)
MCHC RBC AUTO-ENTMCNC: 31.6 G/DL (ref 31.5–36.5)
MCV RBC AUTO: 79 FL (ref 78–100)
MONOCYTES # BLD AUTO: 0.7 10E9/L (ref 0–1.3)
MONOCYTES NFR BLD AUTO: 11 %
NEUTROPHILS # BLD AUTO: 4 10E9/L (ref 1.6–8.3)
NEUTROPHILS NFR BLD AUTO: 65 %
NRBC # BLD AUTO: 0 10*3/UL
NRBC BLD AUTO-RTO: 0 /100
PLATELET # BLD AUTO: 167 10E9/L (ref 150–450)
POTASSIUM SERPL-SCNC: 3.5 MMOL/L (ref 3.4–5.3)
PROT SERPL-MCNC: 7.4 G/DL (ref 6.8–8.8)
RBC # BLD AUTO: 3.91 10E12/L (ref 4.4–5.9)
SODIUM SERPL-SCNC: 139 MMOL/L (ref 133–144)
WBC # BLD AUTO: 6.1 10E9/L (ref 4–11)

## 2018-04-21 PROCEDURE — 25000125 ZZHC RX 250: Performed by: EMERGENCY MEDICINE

## 2018-04-21 PROCEDURE — 70450 CT HEAD/BRAIN W/O DYE: CPT

## 2018-04-21 PROCEDURE — 80320 DRUG SCREEN QUANTALCOHOLS: CPT | Performed by: EMERGENCY MEDICINE

## 2018-04-21 PROCEDURE — 25000132 ZZH RX MED GY IP 250 OP 250 PS 637: Mod: GY | Performed by: EMERGENCY MEDICINE

## 2018-04-21 PROCEDURE — 96366 THER/PROPH/DIAG IV INF ADDON: CPT

## 2018-04-21 PROCEDURE — A9270 NON-COVERED ITEM OR SERVICE: HCPCS | Mod: GY | Performed by: EMERGENCY MEDICINE

## 2018-04-21 PROCEDURE — 99285 EMERGENCY DEPT VISIT HI MDM: CPT | Mod: 25

## 2018-04-21 PROCEDURE — 80053 COMPREHEN METABOLIC PANEL: CPT | Performed by: EMERGENCY MEDICINE

## 2018-04-21 PROCEDURE — 96375 TX/PRO/DX INJ NEW DRUG ADDON: CPT

## 2018-04-21 PROCEDURE — 85025 COMPLETE CBC W/AUTO DIFF WBC: CPT | Performed by: EMERGENCY MEDICINE

## 2018-04-21 PROCEDURE — 96365 THER/PROPH/DIAG IV INF INIT: CPT

## 2018-04-21 PROCEDURE — 25000128 H RX IP 250 OP 636: Performed by: EMERGENCY MEDICINE

## 2018-04-21 RX ORDER — OLANZAPINE 5 MG/1
5 TABLET, ORALLY DISINTEGRATING ORAL ONCE
Status: COMPLETED | OUTPATIENT
Start: 2018-04-21 | End: 2018-04-21

## 2018-04-21 RX ORDER — METHOCARBAMOL 750 MG/1
750 TABLET, FILM COATED ORAL ONCE
Status: COMPLETED | OUTPATIENT
Start: 2018-04-21 | End: 2018-04-21

## 2018-04-21 RX ORDER — METHOCARBAMOL 750 MG/1
750 TABLET, FILM COATED ORAL 3 TIMES DAILY PRN
Qty: 15 TABLET | Refills: 0 | Status: SHIPPED | OUTPATIENT
Start: 2018-04-21 | End: 2018-12-06

## 2018-04-21 RX ORDER — PROMETHAZINE HYDROCHLORIDE 25 MG/ML
12.5 INJECTION, SOLUTION INTRAMUSCULAR; INTRAVENOUS ONCE
Status: DISCONTINUED | OUTPATIENT
Start: 2018-04-21 | End: 2018-04-21

## 2018-04-21 RX ORDER — HYDROXYZINE HYDROCHLORIDE 25 MG/1
50 TABLET, FILM COATED ORAL
Qty: 30 TABLET | Refills: 0 | Status: SHIPPED | OUTPATIENT
Start: 2018-04-21 | End: 2019-08-01

## 2018-04-21 RX ORDER — METOCLOPRAMIDE HYDROCHLORIDE 5 MG/ML
10 INJECTION INTRAMUSCULAR; INTRAVENOUS ONCE
Status: COMPLETED | OUTPATIENT
Start: 2018-04-21 | End: 2018-04-21

## 2018-04-21 RX ORDER — ACETAMINOPHEN 325 MG/1
650 TABLET ORAL ONCE
Status: COMPLETED | OUTPATIENT
Start: 2018-04-21 | End: 2018-04-21

## 2018-04-21 RX ORDER — DIPHENHYDRAMINE HYDROCHLORIDE 50 MG/ML
25 INJECTION INTRAMUSCULAR; INTRAVENOUS ONCE
Status: COMPLETED | OUTPATIENT
Start: 2018-04-21 | End: 2018-04-21

## 2018-04-21 RX ADMIN — FOLIC ACID: 5 INJECTION, SOLUTION INTRAMUSCULAR; INTRAVENOUS; SUBCUTANEOUS at 03:46

## 2018-04-21 RX ADMIN — METHOCARBAMOL 750 MG: 750 TABLET ORAL at 03:45

## 2018-04-21 RX ADMIN — OLANZAPINE 5 MG: 5 TABLET, ORALLY DISINTEGRATING ORAL at 05:41

## 2018-04-21 RX ADMIN — DIPHENHYDRAMINE HYDROCHLORIDE 25 MG: 50 INJECTION, SOLUTION INTRAMUSCULAR; INTRAVENOUS at 04:33

## 2018-04-21 RX ADMIN — METOCLOPRAMIDE 10 MG: 5 INJECTION, SOLUTION INTRAMUSCULAR; INTRAVENOUS at 04:30

## 2018-04-21 RX ADMIN — ACETAMINOPHEN 650 MG: 325 TABLET, FILM COATED ORAL at 03:32

## 2018-04-21 ASSESSMENT — ENCOUNTER SYMPTOMS
MYALGIAS: 0
DIZZINESS: 0
SPEECH DIFFICULTY: 0
PHOTOPHOBIA: 1
FEVER: 0
BLOOD IN STOOL: 0
FACIAL ASYMMETRY: 0
WEAKNESS: 0
ABDOMINAL DISTENTION: 0
HEADACHES: 1
ABDOMINAL PAIN: 0
VOMITING: 0

## 2018-04-21 NOTE — ED PROVIDER NOTES
History     Chief Complaint:  Headache    The history is provided by the EMS personnel and the patient.      Jim Richard is a 63 year old male with history of MI, PVD, HLD, and HTN who presents via EMS with a headache. The patient has had a headache for the past 3 weeks and the pain woke him up tonight, prompting him to call EMS. EMS reports that the patient has had no deficits, dizziness, or vision changes. He does endorse some light sensitivity but this is chronic. He denies any weakness and EMS did not notice any slurred speech. They did find his blood pressure to be 210/110 though he states he has been compliant with his blood pressure medications. Here in the ED the patient states that he has been drinking recently and consumed about 1L of hard alcohol yesterday, with his last drink at 2300 before bed. He states that his main concern is his headache that has been present for the past 3 weeks and is localized behind his left ear. He denies any fever, myalgias, abdominal pain, vomiting, abdominal distension, blood in stool, or other medical concerns. Of note, the patient states he has had thorough imaging of his head over the past several weeks at [facility] with negative results.    Allergies:  No known drug allergies      Medications:    Albuterol inhaler  Atorvastatin  Gabapentin  Hydroxyzine  Mirtazapine  Quetiapine  Aldactazide  Tamsulosin  Trazodone  Cymbalta  Prilosec    Past Medical History:    Alcoholism    Anxiety   Coronary artery disease   Depression   Heart attack   Hepatomegaly   Hyperlipemia   Hypertension   PVD (peripheral vascular disease)    Sleep apnea   Spinal stenosis     Past Surgical History:    Appendectomy  Bypass graft femoropopliteal  Colonoscopy  Endarterectomy femoral  EGD combined  Hernia repair  Laminectomy, fusion lumbar three levels  Tonsillectomy and adenoidectomy    Family History:    Mental illness  CAD  Substance abuse  Cancer    Social History:  Presents  "via EMS   Tobacco use: 1.00 PPD  Alcohol use: History of abuse  PCP: [facility]    Marital Status:  Single      Review of Systems   Constitutional: Negative for fever.   Eyes: Positive for photophobia. Negative for visual disturbance.   Gastrointestinal: Negative for abdominal distention, abdominal pain, blood in stool and vomiting.   Musculoskeletal: Negative for myalgias.   Neurological: Positive for headaches. Negative for dizziness, facial asymmetry, speech difficulty and weakness.   All other systems reviewed and are negative.    Physical Exam     Patient Vitals for the past 24 hrs:   BP Temp Temp src Pulse Resp SpO2 Height   04/21/18 0500 (!) 158/96 - - - - 94 % -   04/21/18 0425 - - - - - 95 % -   04/21/18 0420 159/83 - - - - 95 % -   04/21/18 0400 170/88 - - - - - -   04/21/18 0345 - - - - - 92 % -   04/21/18 0340 (!) 161/91 - - - - 92 % -   04/21/18 0325 159/73 98.5  F (36.9  C) Oral 93 18 93 % 1.676 m (5' 6\")   04/21/18 0320 163/77 - - - - 94 % -      Physical Exam  Constitutional:  Smells of alcohol  HENT:    Normal TM's bilaterally.   Head:    Atraumatic. No mastoid tenderness. No lymphadenopathy. No carotid bruit.  Mouth/Throat:   Oropharynx is without erythema or exudate and mucous membranes are dry.   Eyes:    Conjunctivae normal and EOM are normal.      Pupils are equal, round, and reactive to light.   Neck:    Normal range of motion. Neck supple.   Cardiovascular:  Normal rate, regular rhythm, normal heart sounds and radial and dorsalis pedis pulses are 2+ and symmetric.    Pulmonary/Chest:  Effort normal and breath sounds normal.   Abdominal:   Soft. Bowel sounds are normal.      No splenomegaly or hepatomegaly. No tenderness. No rebound.   Musculoskeletal:  Normal range of motion. No edema and no tenderness.   Neurological:  Alert. Normal strength. No cranial nerve deficit.   Skin:    Skin is warm and dry.   Psychiatric:   Intoxicated    Emergency Department Course "   Imaging:  Radiographic findings were communicated with the patient who voiced understanding of the findings.  Head CT w/o Contrast  IMPRESSION: No acute intracranial abnormality.    TINO CROUCH MD    Imaging independently reviewed and agree with radiologist interpretation.      Laboratory:  CBC: HGB 9.8 (L), o/w WNL (WBC 6.1, )    CMP: Calcium 8.2 (L), AlkPhos 278 (H), ALT 79 (H),  (H), o/w WNL (Creatinine 0.98)     Alcohol level blood: 0.28 (H)    Interventions:  0332: Tylenol 650 mg PO  0345: Robaxin 750 mg PO  0430: Reglan 10 mg IV  0433: Benadryl 25 mg PO  0435: Banana bag (D5% and 0.9% NaCl with MV 10mL, thiamine 100mg, folic acid 1mg) 1L IV infusion   0541: Zyprexa ODT 5 mg PO    Emergency Department Course:  Past medical records, nursing notes, and vitals reviewed.  0310: I performed an exam of the patient and obtained history, as documented above.      0600 I rechecked the patient. Findings and plan explained to the Patient.     I personally reviewed the laboratory results with the patient and answered all related questions.   Impression & Plan    Medical Decision Making:  Jim Richard is a 63 year old male presented with a headache.  Evaluation in the emergency department has been negative. The patient has not had any fever or neck stiffness so I doubt meningitis.  The headache was gradual in onset and like previous headaches so I doubt SAH.  There is no associated numbness, paresthesia or confusion and I doubt stroke or CNS tumor. CT of the head is negative at this time. The patient was treated symptomatically and pain has improved with medication interventions. The patient also presents acutely intoxicated with alcohol. He has longstanding history with alcoholism. He declines any services for treatment. He does not think he will have dangerous withdrawal. Clinically he looks dehydrated and this could be contributing to his headache symptoms. He also reports that he has not been  sleeping. He got a banana bag. He received the above mentioned medications. Finally, he got some ODT Zyprexa after which he was able to sleep. The plan is for reassessment when he is clinically sober. I do not find a dangerous cause for the patient's headache at this point and do not believe for emergent imaging or LP is indicated. He will try robaxin as a muscle relaxer. I discussed the test results with the patient and follow up plan and he voiced understanding. He is signed out to genaro FELDMAN, awaiting clinical sobriety.    Diagnosis:    ICD-10-CM    1. Chronic nonintractable headache, unspecified headache type R51    2. Acute alcoholic intoxication in alcoholism without complication (H) F10.220    3. Insomnia, unspecified type G47.00        Disposition:  Likely to be discharged to home with plan as outlined.    Discharge Medications:  Discharge Medication List as of 4/21/2018  9:26 AM      START taking these medications    Details   methocarbamol (ROBAXIN) 750 MG tablet Take 1 tablet (750 mg) by mouth 3 times daily as needed for muscle spasms, Disp-15 tablet, R-0, Local Print               Jude Javed  4/21/2018    EMERGENCY DEPARTMENT  Jude VALENTE, am serving as a scribe at 3:10 AM on 4/21/2018 to document services personally performed by Dagoberto Garcia MD based on my observations and the provider's statements to me.       Dagoberto Garcia MD  05/16/18 [id number]

## 2018-04-21 NOTE — ED PROVIDER NOTES
I took over care of this patient from my partner, Dr. Garcia, at approximately 0730.    Briefly, patient presented with alcohol intoxication as well as a subacute headache.  I was asked to reassess him when more sober.    I checked on him at 910.  He is resting calmly in room 19.  He feels well.  He is clinically sober.  He passed the road test.  He denied any further questions or concerns and was therefore discharged.       Efrain Morgan MD  04/21/18 1320

## 2018-04-21 NOTE — ED AVS SNAPSHOT
Emergency Department    [address] [facility] [address]    Phone:  [phone]    Fax:  [phone]                                       Jim Richard   MRN: [id number]    Department:   Emergency Department   Date of Visit:  4/21/2018           Patient Information     Date Of Birth          1955        Your diagnoses for this visit were:     Chronic nonintractable headache, unspecified headache type     Acute alcoholic intoxication in alcoholism without complication (H)     Insomnia, unspecified type        You were seen by Dagoberto Garcia MD and Efrain Morgan MD.      Follow-up Information     Follow up with [facility]. Schedule an appointment as soon as possible for a visit in 3 days.    Contact information:    [address] [facility]  [address]  [facility] [address] [phone]          Follow up with  Emergency Department.    Specialty:  EMERGENCY MEDICINE    Why:  If symptoms worsen    Contact information:    [address] [facility] [address] [phone]      Discharge References/Attachments     ALCOHOL INTOXICATION (ENGLISH)    HEADACHE, UNSPECIFIED (ENGLISH)    INSOMNIA (ENGLISH)      24 Hour Appointment Hotline       To make an appointment at any [facility], call [phone] ([phone]). If you don't have a family doctor or clinic, we will help you find one. [facility] clinics are conveniently located to serve the needs of you and your family.             Review of your medicines      START taking        Dose / Directions Last dose taken    methocarbamol 750 MG tablet   Commonly known as:  ROBAXIN   Dose:  750 mg   Quantity:  15 tablet        Take 1 tablet (750 mg) by mouth 3 times daily as needed for muscle spasms   Refills:  0          CONTINUE these medicines which may have CHANGED, or have new prescriptions. If we are uncertain of the size of tablets/capsules you have at home, strength may be listed as  something that might have changed.        Dose / Directions Last dose taken    hydrOXYzine 25 MG tablet   Commonly known as:  ATARAX   Dose:  50 mg   What changed:    - medication strength  - when to take this  - reasons to take this   Quantity:  30 tablet        Take 2 tablets (50 mg) by mouth nightly as needed (sleep)   Refills:  0          Our records show that you are taking the medicines listed below. If these are incorrect, please call your family doctor or clinic.        Dose / Directions Last dose taken    albuterol 108 (90 Base) MCG/ACT Inhaler   Commonly known as:  PROAIR HFA/PROVENTIL HFA/VENTOLIN HFA   Dose:  2 puff        Inhale 2 puffs into the lungs every 6 hours as needed   Refills:  0        ATORVASTATIN CALCIUM PO   Dose:  10 mg        Take 10 mg by mouth At Bedtime   Refills:  0        BREO ELLIPTA 200-25 MCG/INH oral inhaler   Dose:  1 puff   Generic drug:  fluticasone-vilanterol        Inhale 1 puff into the lungs daily as needed   Refills:  0        DULoxetine 60 MG EC capsule   Commonly known as:  CYMBALTA   Dose:  60 mg   Quantity:  30 capsule        Take 1 capsule (60 mg) by mouth daily   Refills:  0        GABAPENTIN PO   Dose:  900 mg        Take 900 mg by mouth 3 times daily as needed   Refills:  0        MIRTAZAPINE PO   Dose:  30 mg        Take 30 mg by mouth At Bedtime   Refills:  0        omeprazole 40 MG capsule   Commonly known as:  priLOSEC   Dose:  40 mg   Quantity:  60 capsule        Take 1 capsule (40 mg) by mouth 2 times daily (before meals) Take 30-60 minutes before a meal.   Refills:  1        prednisoLONE acetate 1 % ophthalmic susp   Commonly known as:  PRED FORTE   Dose:  1 drop        Place 1 drop into the right eye 2 times daily X 7 more days. Post cataract   Refills:  0        QUETIAPINE FUMARATE PO   Dose:  50 mg        Take 50 mg by mouth 3 times daily as needed   Refills:  0        spironolactone-hydrochlorothiazide 25-25 MG per tablet   Commonly known as:   ALDACTAZIDE   Dose:  1 tablet        Take 1 tablet by mouth daily   Refills:  0        TAMSULOSIN HCL PO   Dose:  0.4 mg        Take 0.4 mg by mouth daily   Refills:  0        TRAZODONE HCL PO   Dose:  200 mg        Take 200 mg by mouth At Bedtime (Takes 2 x 100mg for a total of 200mg at bedtime)   Refills:  0                Prescriptions were sent or printed at these locations (2 Prescriptions)                   Other Prescriptions                Printed at Department/Unit printer (2 of 2)         hydrOXYzine (ATARAX) 25 MG tablet               methocarbamol (ROBAXIN) 750 MG tablet                Procedures and tests performed during your visit     Alcohol level blood    CBC with platelets + differential    Comprehensive metabolic panel    Head CT w/o contrast      Orders Needing Specimen Collection     None      Pending Results     No orders found from 4/19/2018 to 4/22/2018.            Pending Culture Results     No orders found from 4/19/2018 to 4/22/2018.            Pending Results Instructions     If you had any lab results that were not finalized at the time of your Discharge, you can call the ED Lab Result RN at [phone]. You will be contacted by this team for any positive Lab results or changes in treatment. The nurses are available 7 days a week from 10A to 6:30P.  You can leave a message 24 hours per day and they will return your call.        Test Results From Your Hospital Stay        4/21/2018  3:34 AM      Component Results     Component Value Ref Range & Units Status    WBC 6.1 4.0 - 11.0 10e9/L Final    RBC Count 3.91 (L) 4.4 - 5.9 10e12/L Final    Hemoglobin 9.8 (L) 13.3 - 17.7 g/dL Final    Hematocrit 31.0 (L) 40.0 - 53.0 % Final    MCV 79 78 - 100 fl Final    MCH 25.1 (L) 26.5 - 33.0 pg Final    MCHC 31.6 31.5 - 36.5 g/dL Final    RDW 19.3 (H) 10.0 - 15.0 % Final    Platelet Count 167 150 - 450 10e9/L Final    Diff Method Automated Method  Final    % Neutrophils 65.0 % Final    % Lymphocytes  21.5 % Final    % Monocytes 11.0 % Final    % Eosinophils 1.2 % Final    % Basophils 1.0 % Final    % Immature Granulocytes 0.3 % Final    Nucleated RBCs 0 0 /100 Final    Absolute Neutrophil 4.0 1.6 - 8.3 10e9/L Final    Absolute Lymphocytes 1.3 0.8 - 5.3 10e9/L Final    Absolute Monocytes 0.7 0.0 - 1.3 10e9/L Final    Absolute Eosinophils 0.1 0.0 - 0.7 10e9/L Final    Absolute Basophils 0.1 0.0 - 0.2 10e9/L Final    Abs Immature Granulocytes 0.0 0 - 0.4 10e9/L Final    Absolute Nucleated RBC 0.0  Final         4/21/2018  3:50 AM      Component Results     Component Value Ref Range & Units Status    Sodium 139 133 - 144 mmol/L Final    Potassium 3.5 3.4 - 5.3 mmol/L Final    Chloride 101 94 - 109 mmol/L Final    Carbon Dioxide 24 20 - 32 mmol/L Final    Anion Gap 14 3 - 14 mmol/L Final    Glucose 88 70 - 99 mg/dL Final    Urea Nitrogen 18 7 - 30 mg/dL Final    Creatinine 0.98 0.66 - 1.25 mg/dL Final    GFR Estimate 77 >60 mL/min/1.7m2 Final    Non  GFR Calc    GFR Estimate If Black >90 >60 mL/min/1.7m2 Final    African American GFR Calc    Calcium 8.2 (L) 8.5 - 10.1 mg/dL Final    Bilirubin Total 0.8 0.2 - 1.3 mg/dL Final    Albumin 3.6 3.4 - 5.0 g/dL Final    Protein Total 7.4 6.8 - 8.8 g/dL Final    Alkaline Phosphatase 278 (H) 40 - 150 U/L Final    ALT 79 (H) 0 - 70 U/L Final     (H) 0 - 45 U/L Final         4/21/2018  3:48 AM      Component Results     Component Value Ref Range & Units Status    Ethanol g/dL 0.28 (H) <0.01 g/dL Final         4/21/2018  4:58 AM      Narrative     CT HEAD W/O CONTRAST 4/21/2018 4:51 AM    HISTORY: Head pain. Currently intoxicated.    COMPARISON: 3/21/2018    TECHNIQUE: Noncontrast head CT.  Radiation dose for this scan was  reduced using automated exposure control, adjustment of the mA and/or  kV according to patient size, or iterative reconstruction technique.    FINDINGS: No intracranial hemorrhage. No abnormal extra-axial fluid  collection. Midline is  maintained. Ventricular volumes are normal. No  evidence of mass or infarct. Calvarium is intact. Sinuses are normally  aerated.        Impression     IMPRESSION: No acute intracranial abnormality.    TINO CROUCH MD                Clinical Quality Measure: Blood Pressure Screening     Your blood pressure was checked while you were in the emergency department today. The last reading we obtained was  BP: 136/80 . Please read the guidelines below about what these numbers mean and what you should do about them.  If your systolic blood pressure (the top number) is less than 120 and your diastolic blood pressure (the bottom number) is less than 80, then your blood pressure is normal. There is nothing more that you need to do about it.  If your systolic blood pressure (the top number) is 120-139 or your diastolic blood pressure (the bottom number) is 80-89, your blood pressure may be higher than it should be. You should have your blood pressure rechecked within a year by a primary care provider.  If your systolic blood pressure (the top number) is 140 or greater or your diastolic blood pressure (the bottom number) is 90 or greater, you may have high blood pressure. High blood pressure is treatable, but if left untreated over time it can put you at risk for heart attack, stroke, or kidney failure. You should have your blood pressure rechecked by a primary care provider within the next 4 weeks.  If your provider in the emergency department today gave you specific instructions to follow-up with your doctor or provider even sooner than that, you should follow that instruction and not wait for up to 4 weeks for your follow-up visit.        Thank you for choosing [facility]       Thank you for choosing [facility] for your care. Our goal is always to provide you with excellent care. Hearing back from our patients is one way we can continue to improve our services. Please take a few minutes to complete the written survey that you  "may receive in the mail after you visit with us. Thank you!        [facility] Information     [email or web address] lets you send messages to your doctor, view your test results, renew your prescriptions, schedule appointments and more. To sign up, go to [email or web address] . Click on \"Log in\" on the left side of the screen, which will take you to the Welcome page. Then click on \"Sign up Now\" on the right side of the page.     You will be asked to enter the access code listed below, as well as some personal information. Please follow the directions to create your username and password.     Your access code is: [email or web address]  Expires: 2018 10:53 AM     Your access code will  in 90 days. If you need help or a new code, please call your [facility] clinic or [phone].        Care EveryWhere ID     This is your Care EveryWhere ID. This could be used by other organizations to access your [facility] medical records  [id number]        Equal Access to Services     EDGAR PLUNKETT : Hadcharleen garciao Solori, waaxda luqadaha, qaybta kaalmalamine lopez, stan vale . So [facility] [phone].    ATENCIÓN: Si habla español, tiene a thompson disposición servicios gratuitos de asistencia lingüística. Llame al [phone].    We comply with applicable federal civil rights laws and [place] laws. We do not discriminate on the basis of race, color, national origin, age, disability, sex, sexual orientation, or gender identity.            After Visit Summary       This is your record. Keep this with you and show to your community pharmacist(s) and doctor(s) at your next visit.                  "

## 2018-04-21 NOTE — ED AVS SNAPSHOT
Emergency Department    [address] [facility] [address]    Phone:  [phone]    Fax:  [phone]                                       Jim Richard   MRN: [id number]    Department:   Emergency Department   Date of Visit:  4/21/2018           After Visit Summary Signature Page     I have received my discharge instructions, and my questions have been answered. I have discussed any challenges I see with this plan with the nurse or doctor.    ..........................................................................................................................................  Patient/Patient Representative Signature      ..........................................................................................................................................  Patient Representative Print Name and Relationship to Patient    ..................................................               ................................................  Date                                            Time    ..........................................................................................................................................  Reviewed by Signature/Title    ...................................................              ..............................................  Date                                                            Time

## 2018-04-26 ENCOUNTER — HOSPITAL ENCOUNTER (INPATIENT)
Facility: CLINIC | Age: 63
LOS: 8 days | Discharge: HOME | DRG: 432 | End: 2018-05-04
Attending: EMERGENCY MEDICINE | Admitting: INTERNAL MEDICINE
Payer: MEDICARE

## 2018-04-26 DIAGNOSIS — K92.0 HEMATEMESIS: Primary | ICD-10-CM

## 2018-04-26 DIAGNOSIS — R51.0 POSITIONAL HEADACHE: ICD-10-CM

## 2018-04-26 DIAGNOSIS — S06.5XAA SDH (SUBDURAL HEMATOMA) (H): ICD-10-CM

## 2018-04-26 LAB
ALBUMIN SERPL-MCNC: 2.7 G/DL (ref 3.4–5)
ALP SERPL-CCNC: 166 U/L (ref 40–150)
ALT SERPL W P-5'-P-CCNC: 48 U/L (ref 0–70)
ANION GAP SERPL CALCULATED.3IONS-SCNC: 12 MMOL/L (ref 3–14)
APTT PPP: 27 SEC (ref 22–37)
AST SERPL W P-5'-P-CCNC: 68 U/L (ref 0–45)
BASOPHILS # BLD AUTO: 0 10E9/L (ref 0–0.2)
BASOPHILS NFR BLD AUTO: 0.3 %
BILIRUB SERPL-MCNC: 1.1 MG/DL (ref 0.2–1.3)
BLD PROD TYP BPU: NORMAL
BLD PROD TYP BPU: NORMAL
BLD UNIT ID BPU: 0
BLD UNIT ID BPU: 0
BLOOD PRODUCT CODE: NORMAL
BLOOD PRODUCT CODE: NORMAL
BPU ID: NORMAL
BPU ID: NORMAL
BUN SERPL-MCNC: 41 MG/DL (ref 7–30)
CALCIUM SERPL-MCNC: 7.7 MG/DL (ref 8.5–10.1)
CHLORIDE SERPL-SCNC: 106 MMOL/L (ref 94–109)
CO2 SERPL-SCNC: 22 MMOL/L (ref 20–32)
CREAT SERPL-MCNC: 0.91 MG/DL (ref 0.66–1.25)
DIFFERENTIAL METHOD BLD: ABNORMAL
EOSINOPHIL # BLD AUTO: 0 10E9/L (ref 0–0.7)
EOSINOPHIL NFR BLD AUTO: 0 %
ERYTHROCYTE [DISTWIDTH] IN BLOOD BY AUTOMATED COUNT: 20 % (ref 10–15)
ETHANOL SERPL-MCNC: <0.01 G/DL
GFR SERPL CREATININE-BSD FRML MDRD: 84 ML/MIN/1.7M2
GLUCOSE BLDC GLUCOMTR-MCNC: 141 MG/DL (ref 70–99)
GLUCOSE SERPL-MCNC: 172 MG/DL (ref 70–99)
HCT VFR BLD AUTO: 18.7 % (ref 40–53)
HGB BLD-MCNC: 5.8 G/DL (ref 13.3–17.7)
IMM GRANULOCYTES # BLD: 0 10E9/L (ref 0–0.4)
IMM GRANULOCYTES NFR BLD: 0.3 %
INR PPP: 1.52 (ref 0.86–1.14)
INTERPRETATION ECG - MUSE: NORMAL
LYMPHOCYTES # BLD AUTO: 0.6 10E9/L (ref 0.8–5.3)
LYMPHOCYTES NFR BLD AUTO: 8.9 %
MCH RBC QN AUTO: 25.2 PG (ref 26.5–33)
MCHC RBC AUTO-ENTMCNC: 31 G/DL (ref 31.5–36.5)
MCV RBC AUTO: 81 FL (ref 78–100)
MONOCYTES # BLD AUTO: 0.4 10E9/L (ref 0–1.3)
MONOCYTES NFR BLD AUTO: 6.4 %
NEUTROPHILS # BLD AUTO: 5.6 10E9/L (ref 1.6–8.3)
NEUTROPHILS NFR BLD AUTO: 84.1 %
NRBC # BLD AUTO: 0 10*3/UL
NRBC BLD AUTO-RTO: 0 /100
PLATELET # BLD AUTO: 151 10E9/L (ref 150–450)
POTASSIUM SERPL-SCNC: 4.1 MMOL/L (ref 3.4–5.3)
PROT SERPL-MCNC: 5.5 G/DL (ref 6.8–8.8)
RBC # BLD AUTO: 2.3 10E12/L (ref 4.4–5.9)
SODIUM SERPL-SCNC: 140 MMOL/L (ref 133–144)
TRANSFUSION STATUS PATIENT QL: NORMAL
TROPONIN I SERPL-MCNC: <0.015 UG/L (ref 0–0.04)
WBC # BLD AUTO: 6.6 10E9/L (ref 4–11)

## 2018-04-26 PROCEDURE — 93005 ELECTROCARDIOGRAM TRACING: CPT

## 2018-04-26 PROCEDURE — 96365 THER/PROPH/DIAG IV INF INIT: CPT

## 2018-04-26 PROCEDURE — 96368 THER/DIAG CONCURRENT INF: CPT

## 2018-04-26 PROCEDURE — 25000125 ZZHC RX 250: Performed by: EMERGENCY MEDICINE

## 2018-04-26 PROCEDURE — 25000128 H RX IP 250 OP 636: Performed by: EMERGENCY MEDICINE

## 2018-04-26 PROCEDURE — 85610 PROTHROMBIN TIME: CPT | Performed by: EMERGENCY MEDICINE

## 2018-04-26 PROCEDURE — 96366 THER/PROPH/DIAG IV INF ADDON: CPT

## 2018-04-26 PROCEDURE — 86900 BLOOD TYPING SEROLOGIC ABO: CPT | Performed by: EMERGENCY MEDICINE

## 2018-04-26 PROCEDURE — 80320 DRUG SCREEN QUANTALCOHOLS: CPT | Performed by: EMERGENCY MEDICINE

## 2018-04-26 PROCEDURE — 86923 COMPATIBILITY TEST ELECTRIC: CPT | Performed by: EMERGENCY MEDICINE

## 2018-04-26 PROCEDURE — 96361 HYDRATE IV INFUSION ADD-ON: CPT

## 2018-04-26 PROCEDURE — 99292 CRITICAL CARE ADDL 30 MIN: CPT

## 2018-04-26 PROCEDURE — 99291 CRITICAL CARE FIRST HOUR: CPT | Mod: 25

## 2018-04-26 PROCEDURE — 00000146 ZZHCL STATISTIC GLUCOSE BY METER IP

## 2018-04-26 PROCEDURE — 96376 TX/PRO/DX INJ SAME DRUG ADON: CPT

## 2018-04-26 PROCEDURE — 85730 THROMBOPLASTIN TIME PARTIAL: CPT | Performed by: EMERGENCY MEDICINE

## 2018-04-26 PROCEDURE — 86850 RBC ANTIBODY SCREEN: CPT | Performed by: EMERGENCY MEDICINE

## 2018-04-26 PROCEDURE — 20000003 ZZH R&B ICU

## 2018-04-26 PROCEDURE — 25000128 H RX IP 250 OP 636: Performed by: INTERNAL MEDICINE

## 2018-04-26 PROCEDURE — 84484 ASSAY OF TROPONIN QUANT: CPT | Performed by: EMERGENCY MEDICINE

## 2018-04-26 PROCEDURE — 96375 TX/PRO/DX INJ NEW DRUG ADDON: CPT

## 2018-04-26 PROCEDURE — 86901 BLOOD TYPING SEROLOGIC RH(D): CPT | Performed by: EMERGENCY MEDICINE

## 2018-04-26 PROCEDURE — 85025 COMPLETE CBC W/AUTO DIFF WBC: CPT | Performed by: EMERGENCY MEDICINE

## 2018-04-26 PROCEDURE — 99223 1ST HOSP IP/OBS HIGH 75: CPT | Mod: AI | Performed by: INTERNAL MEDICINE

## 2018-04-26 PROCEDURE — 80053 COMPREHEN METABOLIC PANEL: CPT | Performed by: EMERGENCY MEDICINE

## 2018-04-26 PROCEDURE — P9016 RBC LEUKOCYTES REDUCED: HCPCS | Performed by: EMERGENCY MEDICINE

## 2018-04-26 PROCEDURE — 25000131 ZZH RX MED GY IP 250 OP 636 PS 637: Mod: GY | Performed by: EMERGENCY MEDICINE

## 2018-04-26 RX ORDER — HYDROMORPHONE HYDROCHLORIDE 1 MG/ML
.3-.5 INJECTION, SOLUTION INTRAMUSCULAR; INTRAVENOUS; SUBCUTANEOUS
Status: DISCONTINUED | OUTPATIENT
Start: 2018-04-26 | End: 2018-04-29

## 2018-04-26 RX ORDER — NALOXONE HYDROCHLORIDE 0.4 MG/ML
.1-.4 INJECTION, SOLUTION INTRAMUSCULAR; INTRAVENOUS; SUBCUTANEOUS
Status: DISCONTINUED | OUTPATIENT
Start: 2018-04-26 | End: 2018-05-04 | Stop reason: HOSPADM

## 2018-04-26 RX ORDER — CEFTRIAXONE 1 G/1
1 INJECTION, POWDER, FOR SOLUTION INTRAMUSCULAR; INTRAVENOUS EVERY 24 HOURS
Status: DISCONTINUED | OUTPATIENT
Start: 2018-04-27 | End: 2018-05-04 | Stop reason: HOSPADM

## 2018-04-26 RX ORDER — OCTREOTIDE ACETATE 50 UG/ML
50 INJECTION, SOLUTION INTRAVENOUS; SUBCUTANEOUS ONCE
Status: COMPLETED | OUTPATIENT
Start: 2018-04-26 | End: 2018-04-26

## 2018-04-26 RX ORDER — ALBUTEROL SULFATE 90 UG/1
2 AEROSOL, METERED RESPIRATORY (INHALATION) EVERY 6 HOURS PRN
Status: DISCONTINUED | OUTPATIENT
Start: 2018-04-26 | End: 2018-05-04 | Stop reason: HOSPADM

## 2018-04-26 RX ORDER — LORAZEPAM 2 MG/ML
1 INJECTION INTRAMUSCULAR EVERY 30 MIN PRN
Status: DISCONTINUED | OUTPATIENT
Start: 2018-04-26 | End: 2018-04-27 | Stop reason: CLARIF

## 2018-04-26 RX ORDER — SODIUM CHLORIDE 9 MG/ML
INJECTION, SOLUTION INTRAVENOUS CONTINUOUS
Status: DISCONTINUED | OUTPATIENT
Start: 2018-04-26 | End: 2018-04-28

## 2018-04-26 RX ORDER — PROCHLORPERAZINE 25 MG
25 SUPPOSITORY, RECTAL RECTAL EVERY 12 HOURS PRN
Status: DISCONTINUED | OUTPATIENT
Start: 2018-04-26 | End: 2018-05-04 | Stop reason: HOSPADM

## 2018-04-26 RX ORDER — POTASSIUM CHLORIDE 29.8 MG/ML
20 INJECTION INTRAVENOUS
Status: DISCONTINUED | OUTPATIENT
Start: 2018-04-26 | End: 2018-05-04 | Stop reason: HOSPADM

## 2018-04-26 RX ORDER — SODIUM CHLORIDE 9 MG/ML
INJECTION, SOLUTION INTRAVENOUS CONTINUOUS
Status: DISCONTINUED | OUTPATIENT
Start: 2018-04-26 | End: 2018-04-26

## 2018-04-26 RX ORDER — ONDANSETRON 2 MG/ML
INJECTION INTRAMUSCULAR; INTRAVENOUS
Status: DISCONTINUED
Start: 2018-04-26 | End: 2018-04-26 | Stop reason: HOSPADM

## 2018-04-26 RX ORDER — LORAZEPAM 2 MG/ML
1 INJECTION INTRAMUSCULAR ONCE
Status: COMPLETED | OUTPATIENT
Start: 2018-04-26 | End: 2018-04-26

## 2018-04-26 RX ORDER — ONDANSETRON 2 MG/ML
4 INJECTION INTRAMUSCULAR; INTRAVENOUS EVERY 6 HOURS PRN
Status: DISCONTINUED | OUTPATIENT
Start: 2018-04-26 | End: 2018-05-04 | Stop reason: HOSPADM

## 2018-04-26 RX ORDER — ONDANSETRON 4 MG/1
4 TABLET, ORALLY DISINTEGRATING ORAL EVERY 6 HOURS PRN
Status: DISCONTINUED | OUTPATIENT
Start: 2018-04-26 | End: 2018-05-04 | Stop reason: HOSPADM

## 2018-04-26 RX ORDER — LORAZEPAM 1 MG/1
1 TABLET ORAL EVERY 30 MIN PRN
Status: DISCONTINUED | OUTPATIENT
Start: 2018-04-26 | End: 2018-04-27 | Stop reason: CLARIF

## 2018-04-26 RX ORDER — POTASSIUM CHLORIDE 7.45 MG/ML
10 INJECTION INTRAVENOUS
Status: DISCONTINUED | OUTPATIENT
Start: 2018-04-26 | End: 2018-05-04 | Stop reason: HOSPADM

## 2018-04-26 RX ORDER — POLYETHYLENE GLYCOL 3350 17 G/17G
17 POWDER, FOR SOLUTION ORAL DAILY PRN
Status: DISCONTINUED | OUTPATIENT
Start: 2018-04-26 | End: 2018-05-04 | Stop reason: HOSPADM

## 2018-04-26 RX ORDER — POTASSIUM CL/LIDO/0.9 % NACL 10MEQ/0.1L
10 INTRAVENOUS SOLUTION, PIGGYBACK (ML) INTRAVENOUS
Status: DISCONTINUED | OUTPATIENT
Start: 2018-04-26 | End: 2018-05-04 | Stop reason: HOSPADM

## 2018-04-26 RX ORDER — ACETAMINOPHEN 325 MG/1
650 TABLET ORAL EVERY 4 HOURS PRN
Status: DISCONTINUED | OUTPATIENT
Start: 2018-04-26 | End: 2018-05-04 | Stop reason: HOSPADM

## 2018-04-26 RX ORDER — PROCHLORPERAZINE MALEATE 10 MG
10 TABLET ORAL EVERY 6 HOURS PRN
Status: DISCONTINUED | OUTPATIENT
Start: 2018-04-26 | End: 2018-05-04 | Stop reason: HOSPADM

## 2018-04-26 RX ORDER — POTASSIUM CHLORIDE 1.5 G/1.58G
20-40 POWDER, FOR SOLUTION ORAL
Status: DISCONTINUED | OUTPATIENT
Start: 2018-04-26 | End: 2018-05-04 | Stop reason: HOSPADM

## 2018-04-26 RX ORDER — POTASSIUM CHLORIDE 1500 MG/1
20-40 TABLET, EXTENDED RELEASE ORAL
Status: DISCONTINUED | OUTPATIENT
Start: 2018-04-26 | End: 2018-05-04 | Stop reason: HOSPADM

## 2018-04-26 RX ORDER — CEFTRIAXONE 1 G/1
1 INJECTION, POWDER, FOR SOLUTION INTRAMUSCULAR; INTRAVENOUS ONCE
Status: COMPLETED | OUTPATIENT
Start: 2018-04-26 | End: 2018-04-26

## 2018-04-26 RX ADMIN — HYDROMORPHONE HYDROCHLORIDE 0.5 MG: 1 INJECTION, SOLUTION INTRAMUSCULAR; INTRAVENOUS; SUBCUTANEOUS at 22:57

## 2018-04-26 RX ADMIN — LORAZEPAM 1 MG: 2 INJECTION INTRAMUSCULAR; INTRAVENOUS at 22:09

## 2018-04-26 RX ADMIN — SODIUM CHLORIDE 1000 ML: 9 INJECTION, SOLUTION INTRAVENOUS at 19:51

## 2018-04-26 RX ADMIN — SODIUM CHLORIDE 1000 ML: 9 INJECTION, SOLUTION INTRAVENOUS at 20:02

## 2018-04-26 RX ADMIN — SODIUM CHLORIDE 8 MG/HR: 9 INJECTION, SOLUTION INTRAVENOUS at 22:37

## 2018-04-26 RX ADMIN — PANTOPRAZOLE SODIUM 40 MG: 40 INJECTION, POWDER, FOR SOLUTION INTRAVENOUS at 19:57

## 2018-04-26 RX ADMIN — SODIUM CHLORIDE 8 MG/HR: 9 INJECTION, SOLUTION INTRAVENOUS at 21:35

## 2018-04-26 RX ADMIN — CEFTRIAXONE 1 G: 1 INJECTION, POWDER, FOR SOLUTION INTRAMUSCULAR; INTRAVENOUS at 19:59

## 2018-04-26 RX ADMIN — OCTREOTIDE ACETATE 50 MCG: 50 INJECTION, SOLUTION INTRAVENOUS; SUBCUTANEOUS at 20:23

## 2018-04-26 RX ADMIN — SODIUM CHLORIDE: 9 INJECTION, SOLUTION INTRAVENOUS at 22:35

## 2018-04-26 RX ADMIN — OCTREOTIDE ACETATE 50 MCG/HR: 200 INJECTION, SOLUTION INTRAVENOUS; SUBCUTANEOUS at 20:29

## 2018-04-26 RX ADMIN — LORAZEPAM 1 MG: 2 INJECTION INTRAMUSCULAR; INTRAVENOUS at 19:54

## 2018-04-26 ASSESSMENT — ENCOUNTER SYMPTOMS
ABDOMINAL PAIN: 0
VOMITING: 1
BLOOD IN STOOL: 0
TREMORS: 1
HEADACHES: 1

## 2018-04-26 ASSESSMENT — PAIN DESCRIPTION - DESCRIPTORS: DESCRIPTORS: ACHING

## 2018-04-26 NOTE — IP AVS SNAPSHOT
[address] [facility]    [address]    [place] [address]    Phone:  [phone]                                       After Visit Summary   4/26/2018    Jim Richard    MRN: [id number]           After Visit Summary Signature Page     I have received my discharge instructions, and my questions have been answered. I have discussed any challenges I see with this plan with the nurse or doctor.    ..........................................................................................................................................  Patient/Patient Representative Signature      ..........................................................................................................................................  Patient Representative Print Name and Relationship to Patient    ..................................................               ................................................  Date                                            Time    ..........................................................................................................................................  Reviewed by Signature/Title    ...................................................              ..............................................  Date                                                            Time

## 2018-04-26 NOTE — IP AVS SNAPSHOT
MRN:[id number]                      After Visit Summary   4/26/2018    Jim Richard    MRN: [id number]           Thank you!     Thank you for choosing [facility] for your care. Our goal is always to provide you with excellent care. Hearing back from our patients is one way we can continue to improve our services. Please take a few minutes to complete the written survey that you may receive in the mail after you visit with us. Thank you!        Patient Information     Date Of Birth          1955        Designated Caregiver       Most Recent Value    Caregiver    Will someone help with your care after discharge? no      About your hospital stay     You were admitted on:  April 26, 2018 You last received care in the:  [facility] [facility] [address] [facility]    You were discharged on:  May 4, 2018        Reason for your hospital stay       GI bleed                  Who to Call     For medical emergencies, please call 911.  For non-urgent questions about your medical care, please call your primary care provider or clinic, [phone]  For questions related to your surgery, please call your surgery clinic        Attending Provider     Provider Specialty    Joya Mondragon MD Emergency Medicine    Raquel Chinchilla MD Internal Medicine       Primary Care Provider Office Phone # Fax #    Talon Elias [phone]      After Care Instructions     Activity       Your activity upon discharge: activity as tolerated            Diet       Follow this diet upon discharge: Orders Placed This Encounter      2 Gram Sodium Diet                  Follow-up Appointments     Follow-up and recommended labs and tests        [facility] in 3-4 weeks with a CT scan prior.  Please call the clinic at [phone] to schedule your appointment with Jourdan Sousa PA-C or Chayo Martinez PA-C            Follow-up and recommended labs and tests        Follow up with  primary care provider, FERNANDA ELIAS, within 2-4 week, for hospital follow- up. No follow up labs or test are needed.    -A follow-up appointment has been made for you with Dr. Elias on Wednesday, May 16th at 11:10 AM at the [facility] at the [address] [place] [address].  Please call the clinic at [phone] should you need to change or cancel your appointment.  Please arrive 15 minutes early to your scheduled appointment and bring your photo ID, insurance card and co-payment.       Should also follow-up with neurosurgery with CT head as directed                  Your next 10 appointments already scheduled     May 25, 2018 11:15 AM CDT   CT HEAD W/O CONTRAST with SHCT1   [facility] CT ([facility])    [address] [facility] [address] [phone]           Please bring any scans or X-rays taken at other hospitals, if similar tests were done. Also bring a list of your medicines, including vitamins, minerals and over-the-counter drugs. It is safest to leave personal items at home.  Be sure to tell your doctor:   If you have any allergies.   If there s any chance you are pregnant.   If you are breastfeeding.  You do not need to do anything special to prepare for this exam.  Please wear loose clothing, such as a sweat suit or jogging clothes. Avoid snaps, zippers and other metal. We may ask you to undress and put on a hospital gown.            May 30, 2018 11:00 AM CDT   Return Visit with Chayo Martinez PA-C   [facility] ([facility])    [address] [id number] [phone]              Future tests that were ordered for you     CT Head w/o contrast*                 Further instructions from your care team       -A follow-up appointment has been made for you with Dr. Elias on Wednesday, May 16th at 11:10 AM at the [facility] at the [address] [place] [address].  Please call the  "clinic at [phone] should you need to change or cancel your appointment.  Please arrive 15 minutes early to your scheduled appointment and bring your photo ID, insurance card and co-payment.       - A follow-up appointment was scheduled for you [see above].  It is very important to go to it--bring these papers and your insurance card with you.  At the visit, talk about your hospital stay.  Tell your doctor how you feel.  Show your new list of medications.  Your doctor will update records, make sure you are still doing OK, and decide if any tests or medication changes are needed.      -Please arrive at 11:00 AM for your CT scan on Friday, May 25th and check in at the [facility] at [facility].     Pending Results     Date and Time Order Name Status Description    5/2/2018 1317 Blood culture Preliminary     5/2/2018 1317 Blood culture Preliminary             Statement of Approval     Ordered          05/04/18 1150  I have reviewed and agree with all the recommendations and orders detailed in this document.  EFFECTIVE NOW     Approved and electronically signed by:  Godfrey Vasques DO             Admission Information     Date & Time Provider Department Dept. Phone    4/26/2018 Raquel Chinchilla MD [place] [address] [facility] [phone]      Your Vitals Were     Blood Pressure Pulse Temperature Respirations Height Weight    152/83 (BP Location: Right arm) 66 98.2  F (36.8  C) (Oral) 18 1.702 m (5' 7\") 92.6 kg (204 lb 2.3 oz)    Pulse Oximetry BMI (Body Mass Index)                95% 31.97 kg/m2          Zip[employer] Information     [employer]hip lets you send messages to your doctor, view your test results, renew your prescriptions, schedule appointments and more. To sign up, go to [email or web address] . Click on \"Log in\" on the left side of the screen, which will take you to the Welcome page. Then click on \"Sign up Now\" on the right side of the page.     You will be asked to " enter the access code listed below, as well as some personal information. Please follow the directions to create your username and password.     Your access code is: [email or web address]  Expires: 2018 10:53 AM     Your access code will  in 90 days. If you need help or a new code, please call your [place] clinic or [phone].        Care EveryWhere ID     This is your Care EveryWhere ID. This could be used by other organizations to access your [facility] medical records  [id number]        Equal Access to Services     [facility]: Hadii fito reyes hadcorrina Solori, waaxda luqadaha, qadidi kaalmalamine lopez, stan vale . So [facility] [phone].    ATENCIÓN: Si habla español, tiene a thompson disposición servicios gratuitos de asistencia lingüística. Jeremías al [phone].    We comply with applicable federal civil rights laws and [place] laws. We do not discriminate on the basis of race, color, national origin, age, disability, sex, sexual orientation, or gender identity.               Review of your medicines      START taking        Dose / Directions    pregabalin 25 MG capsule   Commonly known as:  LYRICA        Dose:  25 mg   Take 1 capsule (25 mg) by mouth 3 times daily   Quantity:  90 capsule   Refills:  0         CONTINUE these medicines which have NOT CHANGED        Dose / Directions    albuterol 108 (90 Base) MCG/ACT Inhaler   Commonly known as:  PROAIR HFA/PROVENTIL HFA/VENTOLIN HFA   Notes to Patient:  Not taken in hospital  Resume home schedule          Dose:  2 puff   Inhale 2 puffs into the lungs every 6 hours as needed   Refills:  0       ATORVASTATIN CALCIUM PO        Dose:  10 mg   Take 10 mg by mouth At Bedtime   Refills:  0       BREO ELLIPTA 200-25 MCG/INH oral inhaler   Generic drug:  fluticasone-vilanterol        Dose:  1 puff   Inhale 1 puff into the lungs daily as needed   Refills:  0       DULoxetine 60 MG EC capsule   Commonly known as:  CYMBALTA   Used for:   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)        Dose:  60 mg   Take 1 capsule (60 mg) by mouth daily   Quantity:  30 capsule   Refills:  0       hydrOXYzine 25 MG tablet   Commonly known as:  ATARAX   Notes to Patient:  Not taken in hospital  Resume home schedule          Dose:  50 mg   Take 2 tablets (50 mg) by mouth nightly as needed (sleep)   Quantity:  30 tablet   Refills:  0       methocarbamol 750 MG tablet   Commonly known as:  ROBAXIN   Notes to Patient:  Not taken in hospital  Resume home schedule          Dose:  750 mg   Take 1 tablet (750 mg) by mouth 3 times daily as needed for muscle spasms   Quantity:  15 tablet   Refills:  0       MIRTAZAPINE PO        Dose:  30 mg   Take 30 mg by mouth At Bedtime   Refills:  0       omeprazole 40 MG capsule   Commonly known as:  priLOSEC   Used for:  Hematemesis without nausea        Dose:  40 mg   Take 1 capsule (40 mg) by mouth 2 times daily (before meals) Take 30-60 minutes before a meal.   Quantity:  60 capsule   Refills:  1       QUETIAPINE FUMARATE PO        Dose:  50 mg   Take 50 mg by mouth 3 times daily as needed   Refills:  0       spironolactone-hydrochlorothiazide 25-25 MG per tablet   Commonly known as:  ALDACTAZIDE   Notes to Patient:  Not taken in hospital  Resume home schedule          Dose:  1 tablet   Take 1 tablet by mouth daily   Refills:  0       TAMSULOSIN HCL PO        Dose:  0.4 mg   Take 0.4 mg by mouth daily   Refills:  0       TRAZODONE HCL PO   Notes to Patient:  Not taken in hospital  Resume home schedule          Dose:  100 mg   Take 100 mg by mouth nightly as needed (Takes 2 x 100mg for a total of 200mg at bedtime)   Refills:  0         STOP taking     GABAPENTIN PO                Where to get your medicines      Some of these will need a paper prescription and others can be bought over the counter. Ask your nurse if you have questions.     Bring a paper prescription for each of these medications     pregabalin  25 MG capsule                Protect others around you: Learn how to safely use, store and throw away your medicines at www.disposemymeds.org.             Medication List: This is a list of all your medications and when to take them. Check marks below indicate your daily home schedule. Keep this list as a reference.      Medications           Morning Afternoon Evening Bedtime As Needed    albuterol 108 (90 Base) MCG/ACT Inhaler   Commonly known as:  PROAIR HFA/PROVENTIL HFA/VENTOLIN HFA   Inhale 2 puffs into the lungs every 6 hours as needed   Notes to Patient:  Not taken in hospital  Resume home schedule                                     ATORVASTATIN CALCIUM PO   Take 10 mg by mouth At Bedtime   Last time this was given:  10 mg on 5/3/2018  9:18 PM   Next Dose Due:  5/4                                BREO ELLIPTA 200-25 MCG/INH oral inhaler   Inhale 1 puff into the lungs daily as needed   Last time this was given:  1 puff on 5/4/2018  8:56 AM   Generic drug:  fluticasone-vilanterol                                   DULoxetine 60 MG EC capsule   Commonly known as:  CYMBALTA   Take 1 capsule (60 mg) by mouth daily   Last time this was given:  60 mg on 5/4/2018  8:49 AM   Next Dose Due:  5/5                                hydrOXYzine 25 MG tablet   Commonly known as:  ATARAX   Take 2 tablets (50 mg) by mouth nightly as needed (sleep)   Notes to Patient:  Not taken in hospital  Resume home schedule                                  methocarbamol 750 MG tablet   Commonly known as:  ROBAXIN   Take 1 tablet (750 mg) by mouth 3 times daily as needed for muscle spasms   Notes to Patient:  Not taken in hospital  Resume home schedule                                     MIRTAZAPINE PO   Take 30 mg by mouth At Bedtime   Last time this was given:  30 mg on 5/3/2018  9:19 PM   Next Dose Due:  5/4                                omeprazole 40 MG capsule   Commonly known as:  priLOSEC   Take 1 capsule (40 mg) by mouth 2 times daily  (before meals) Take 30-60 minutes before a meal.   Next Dose Due:  5/4 PM                                pregabalin 25 MG capsule   Commonly known as:  LYRICA   Take 1 capsule (25 mg) by mouth 3 times daily   Last time this was given:  25 mg on 5/4/2018  8:49 AM   Next Dose Due:  5/4 4pm                                QUETIAPINE FUMARATE PO   Take 50 mg by mouth 3 times daily as needed   Last time this was given:  50 mg on 5/4/2018  8:56 AM                                   spironolactone-hydrochlorothiazide 25-25 MG per tablet   Commonly known as:  ALDACTAZIDE   Take 1 tablet by mouth daily   Notes to Patient:  Not taken in hospital  Resume home schedule                                  TAMSULOSIN HCL PO   Take 0.4 mg by mouth daily   Last time this was given:  0.4 mg on 5/4/2018  8:48 AM   Next Dose Due:  5/5 AM                                TRAZODONE HCL PO   Take 100 mg by mouth nightly as needed (Takes 2 x 100mg for a total of 200mg at bedtime)   Notes to Patient:  Not taken in hospital  Resume home schedule                                            More Information        Pregabalin capsules  Brand Name: Lyrica  What is this medicine?  PREGABALIN (pre CECILIA a sada) is used to treat nerve pain from diabetes, shingles, spinal cord injury, and fibromyalgia. It is also used to control seizures in epilepsy.  How should I use this medicine?  Take this medicine by mouth with a glass of water. Follow the directions on the prescription label. You can take this medicine with or without food. Take your doses at regular intervals. Do not take your medicine more often than directed. Do not stop taking except on your doctor's advice.  A special MedGuide will be given to you by the pharmacist with each prescription and refill. Be sure to read this information carefully each time.  Talk to your pediatrician regarding the use of this medicine in children. Special care may be needed.  What side effects may I notice from  receiving this medicine?  Side effects that you should report to your doctor or health care professional as soon as possible:    allergic reactions like skin rash, itching or hives, swelling of the face, lips, or tongue    breathing problems    changes in vision    chest pain    confusion    jerking or unusual movements of any part of your body    loss of memory    muscle pain, tenderness, or weakness    suicidal thoughts or other mood changes    swelling of the ankles, feet, hands    unusual bruising or bleeding  Side effects that usually do not require medical attention (report to your doctor or health care professional if they continue or are bothersome):    dizziness    drowsiness    dry mouth    headache    nausea    tremors    trouble sleeping    weight gain  What may interact with this medicine?    alcohol    certain medicines for blood pressure like captopril, enalapril, or lisinopril    certain medicines for diabetes, like pioglitazone or rosiglitazone    certain medicines for anxiety or sleep    narcotic medicines for pain    What if I miss a dose?  If you miss a dose, take it as soon as you can. If it is almost time for your next dose, take only that dose. Do not take double or extra doses.  Where should I keep my medicine?  Keep out of the reach of children. This medicine can be abused. Keep your medicine in a safe place to protect it from theft. Do not share this medicine with anyone. Selling or giving away this medicine is dangerous and against the law.  This medicine may cause accidental overdose and death if it taken by other adults, children, or pets. Mix any unused medicine with a substance like cat litter or coffee grounds. Then throw the medicine away in a sealed container like a sealed bag or a coffee can with a lid. Do not use the medicine after the expiration date.  Store at room temperature between 15 and 30 degrees C (59 and 86 degrees F).  What should I tell my health care provider before I  take this medicine?  They need to know if you have any of these conditions:    bleeding problems    heart disease, including heart failure    history of alcohol or drug abuse    kidney disease    suicidal thoughts, plans, or attempt; a previous suicide attempt by you or a family member    an unusual or allergic reaction to pregabalin, gabapentin, other medicines, foods, dyes, or preservatives    pregnant or trying to get pregnant or trying to conceive with your partner    breast-feeding  What should I watch for while using this medicine?  Tell your doctor or healthcare professional if your symptoms do not start to get better or if they get worse. Visit your doctor or health care professional for regular checks on your progress. Do not stop taking except on your doctor's advice. You may develop a severe reaction. Your doctor will tell you how much medicine to take.  Wear a medical identification bracelet or chain if you are taking this medicine for seizures, and carry a card that describes your disease and details of your medicine and dosage times.  You may get drowsy or dizzy. Do not drive, use machinery, or do anything that needs mental alertness until you know how this medicine affects you. Do not stand or sit up quickly, especially if you are an older patient. This reduces the risk of dizzy or fainting spells. Alcohol may interfere with the effect of this medicine. Avoid alcoholic drinks.  If you have a heart condition, like congestive heart failure, and notice that you are retaining water and have swelling in your hands or feet, contact your health care provider immediately.  The use of this medicine may increase the chance of suicidal thoughts or actions. Pay special attention to how you are responding while on this medicine. Any worsening of mood, or thoughts of suicide or dying should be reported to your health care professional right away.  This medicine has caused reduced sperm counts in some men. This may  interfere with the ability to father a child. You should talk to your doctor or health care professional if you are concerned about your fertility.  Women who become pregnant while using this medicine for seizures may enroll in the [place] American Antiepileptic Drug Pregnancy Registry by calling 1-854.569.7913. This registry collects information about the safety of antiepileptic drug use during pregnancy.  NOTE:This sheet is a summary. It may not cover all possible information. If you have questions about this medicine, talk to your doctor, pharmacist, or health care provider. Copyright  2018 [employer]                Patient Education    Quetiapine Fumarate Oral tablet    Quetiapine Fumarate Oral tablet, extended-release  Quetiapine Fumarate Oral tablet  What is this medicine?  QUETIAPINE (kwe STEPHANIE a peen) is an antipsychotic. It is used to treat schizophrenia and bipolar disorder, also known as manic-depression.  This medicine may be used for other purposes; ask your health care provider or pharmacist if you have questions.  What should I tell my health care provider before I take this medicine?  They need to know if you have any of these conditions:    brain tumor or head injury    breast cancer    cataracts    diabetes    difficulty swallowing    heart disease    kidney disease    liver disease    low blood counts, like low white cell, platelet, or red cell counts    low blood pressure or dizziness when standing up    Parkinson's disease    previous heart attack    seizures    suicidal thoughts, plans, or attempt by you or a family member    thyroid disease    an unusual or allergic reaction to quetiapine, other medicines, foods, dyes, or preservatives    pregnant or trying to get pregnant    breast-feeding  How should I use this medicine?  Take this medicine by mouth. Swallow it with a drink of water. Follow the directions on the prescription label. If it upsets your stomach you can take it with food. Take your  medicine at regular intervals. Do not take it more often than directed. Do not stop taking except on the advice of your doctor or health care professional.  A special MedGuide will be given to you by the pharmacist with each prescription and refill. Be sure to read this information carefully each time.  Talk to your pediatrician regarding the use of this medicine in children. While this drug may be prescribed for children as young as 10 years for selected conditions, precautions do apply.  Patients over age 65 years may have a stronger reaction to this medicine and need smaller doses.  Overdosage: If you think you have taken too much of this medicine contact a poison control center or emergency room at once.  NOTE: This medicine is only for you. Do not share this medicine with others.  What if I miss a dose?  If you miss a dose, take it as soon as you can. If it is almost time for your next dose, take only that dose. Do not take double or extra doses.  What may interact with this medicine?  Do not take this medicine with any of the following medications:    certain medicines for fungal infections like fluconazole, itraconazole, ketoconazole, posaconazole, voriconazole    cisapride    dofetilide    dronedarone    droperidol    grepafloxacin    halofantrine    phenothiazines like chlorpromazine, mesoridazine, thioridazine    pimozide    sparfloxacin    ziprasidone  This medicine may also interact with the following medications:    alcohol    antiviral medicines for HIV or AIDS    certain medicines for blood pressure    certain medicines for depression, anxiety, or psychotic disturbances like haloperidol, lorazepam    certain medicines for diabetes    certain medicines for Parkinson's disease    certain medicines for seizures like carbamazepine, phenobarbital, phenytoin    cimetidine    erythromycin    other medicines that prolong the QT interval (cause an abnormal heart rhythm)    rifampin    steroid medicines like  prednisone or cortisone  This list may not describe all possible interactions. Give your health care provider a list of all the medicines, herbs, non-prescription drugs, or dietary supplements you use. Also tell them if you smoke, drink alcohol, or use illegal drugs. Some items may interact with your medicine.  What should I watch for while using this medicine?  Visit your doctor or health care professional for regular checks on your progress. It may be several weeks before you see the full effects of this medicine.  Your health care provider may suggest that you have your eyes examined prior to starting this medicine, and every 6 months thereafter.  If you have been taking this medicine regularly for some time, do not suddenly stop taking it. You must gradually reduce the dose or your symptoms may get worse. Ask your doctor or health care professional for advice.  Patients and their families should watch out for worsening depression or thoughts of suicide. Also watch out for sudden or severe changes in feelings such as feeling anxious, agitated, panicky, irritable, hostile, aggressive, impulsive, severely restless, overly excited and hyperactive, or not being able to sleep. If this happens, especially at the beginning of antidepressant treatment or after a change in dose, call your health care professional.  You may get dizzy or drowsy. Do not drive, use machinery, or do anything that needs mental alertness until you know how this medicine affects you. Do not stand or sit up quickly, especially if you are an older patient. This reduces the risk of dizzy or fainting spells. Alcohol can increase dizziness and drowsiness. Avoid alcoholic drinks.  Do not treat yourself for colds, diarrhea or allergies. Ask your doctor or health care professional for advice, some ingredients may increase possible side effects.  This medicine can reduce the response of your body to heat or cold. Dress warm in cold weather and stay  hydrated in hot weather. If possible, avoid extreme temperatures like saunas, hot tubs, very hot or cold showers, or activities that can cause dehydration such as vigorous exercise.  What side effects may I notice from receiving this medicine?  Side effects that you should report to your doctor or health care professional as soon as possible:    allergic reactions like skin rash, itching or hives, swelling of the face, lips, or tongue    difficulty swallowing    fast or irregular heartbeat    fever or chills, sore throat    increased hunger or thirst    increased urination    problems with balance, talking, walking    seizures    stiff muscles    suicidal thoughts or other mood changes    uncontrollable head, mouth, neck, arm, or leg movements    unusually weak or tired  Side effects that usually do not require medical attention (report to your doctor or health care professional if they continue or are bothersome):    change in sex drive or performance    constipation    drowsy or dizzy    dry mouth    stomach upset    weight gain  This list may not describe all possible side effects. Call your doctor for medical advice about side effects. You may report side effects to FDA at 0-118-FDA-4861.  Where should I keep my medicine?  Keep out of the reach of children.  Store at room temperature between 15 and 30 degrees C (59 and 86 degrees F). Throw away any unused medicine after the expiration date.  NOTE:This sheet is a summary. It may not cover all possible information. If you have questions about this medicine, talk to your doctor, pharmacist, or health care provider. Copyright  2016 Gold Standard

## 2018-04-27 LAB
ABO + RH BLD: NORMAL
ABO + RH BLD: NORMAL
ANION GAP SERPL CALCULATED.3IONS-SCNC: 7 MMOL/L (ref 3–14)
BLD GP AB SCN SERPL QL: NORMAL
BLD PROD TYP BPU: NORMAL
BLD PROD TYP BPU: NORMAL
BLD UNIT ID BPU: 0
BLOOD BANK CMNT PATIENT-IMP: NORMAL
BLOOD PRODUCT CODE: NORMAL
BPU ID: NORMAL
BUN SERPL-MCNC: 48 MG/DL (ref 7–30)
CA-I SERPL ISE-MCNC: 4 MG/DL (ref 4.4–5.2)
CALCIUM SERPL-MCNC: 7 MG/DL (ref 8.5–10.1)
CHLORIDE SERPL-SCNC: 111 MMOL/L (ref 94–109)
CO2 SERPL-SCNC: 24 MMOL/L (ref 20–32)
CREAT SERPL-MCNC: 1.09 MG/DL (ref 0.66–1.25)
ERYTHROCYTE [DISTWIDTH] IN BLOOD BY AUTOMATED COUNT: 18.4 % (ref 10–15)
GFR SERPL CREATININE-BSD FRML MDRD: 68 ML/MIN/1.7M2
GLUCOSE BLDC GLUCOMTR-MCNC: 121 MG/DL (ref 70–99)
GLUCOSE SERPL-MCNC: 109 MG/DL (ref 70–99)
HCT VFR BLD AUTO: 22.8 % (ref 40–53)
HGB BLD-MCNC: 6.7 G/DL (ref 13.3–17.7)
HGB BLD-MCNC: 7.3 G/DL (ref 13.3–17.7)
HGB BLD-MCNC: 7.3 G/DL (ref 13.3–17.7)
HGB BLD-MCNC: 7.5 G/DL (ref 13.3–17.7)
HGB BLD-MCNC: 7.6 G/DL (ref 13.3–17.7)
HGB BLD-MCNC: 7.7 G/DL (ref 13.3–17.7)
MCH RBC QN AUTO: 27.2 PG (ref 26.5–33)
MCHC RBC AUTO-ENTMCNC: 32.9 G/DL (ref 31.5–36.5)
MCV RBC AUTO: 83 FL (ref 78–100)
NUM BPU REQUESTED: 3
PLATELET # BLD AUTO: 87 10E9/L (ref 150–450)
POTASSIUM SERPL-SCNC: 4.2 MMOL/L (ref 3.4–5.3)
RBC # BLD AUTO: 2.76 10E12/L (ref 4.4–5.9)
SODIUM SERPL-SCNC: 142 MMOL/L (ref 133–144)
SPECIMEN EXP DATE BLD: NORMAL
TRANSFUSION STATUS PATIENT QL: NORMAL
TRANSFUSION STATUS PATIENT QL: NORMAL
UPPER GI ENDOSCOPY: NORMAL
WBC # BLD AUTO: 5.3 10E9/L (ref 4–11)

## 2018-04-27 PROCEDURE — 25000128 H RX IP 250 OP 636: Performed by: PHYSICIAN ASSISTANT

## 2018-04-27 PROCEDURE — 25000128 H RX IP 250 OP 636: Performed by: INTERNAL MEDICINE

## 2018-04-27 PROCEDURE — 25000125 ZZHC RX 250

## 2018-04-27 PROCEDURE — 20000003 ZZH R&B ICU

## 2018-04-27 PROCEDURE — HZ2ZZZZ DETOXIFICATION SERVICES FOR SUBSTANCE ABUSE TREATMENT: ICD-10-PCS | Performed by: INTERNAL MEDICINE

## 2018-04-27 PROCEDURE — 25000131 ZZH RX MED GY IP 250 OP 636 PS 637: Mod: GY | Performed by: EMERGENCY MEDICINE

## 2018-04-27 PROCEDURE — A9270 NON-COVERED ITEM OR SERVICE: HCPCS | Mod: GY | Performed by: PSYCHIATRY & NEUROLOGY

## 2018-04-27 PROCEDURE — 25000132 ZZH RX MED GY IP 250 OP 250 PS 637: Mod: GY | Performed by: INTERNAL MEDICINE

## 2018-04-27 PROCEDURE — 80048 BASIC METABOLIC PNL TOTAL CA: CPT | Performed by: INTERNAL MEDICINE

## 2018-04-27 PROCEDURE — 43244 EGD VARICES LIGATION: CPT | Performed by: INTERNAL MEDICINE

## 2018-04-27 PROCEDURE — 25000128 H RX IP 250 OP 636: Performed by: EMERGENCY MEDICINE

## 2018-04-27 PROCEDURE — 99222 1ST HOSP IP/OBS MODERATE 55: CPT | Performed by: PSYCHIATRY & NEUROLOGY

## 2018-04-27 PROCEDURE — 25000132 ZZH RX MED GY IP 250 OP 250 PS 637: Mod: GY | Performed by: PSYCHIATRY & NEUROLOGY

## 2018-04-27 PROCEDURE — 25000128 H RX IP 250 OP 636: Performed by: PSYCHIATRY & NEUROLOGY

## 2018-04-27 PROCEDURE — P9016 RBC LEUKOCYTES REDUCED: HCPCS | Performed by: EMERGENCY MEDICINE

## 2018-04-27 PROCEDURE — 00000146 ZZHCL STATISTIC GLUCOSE BY METER IP

## 2018-04-27 PROCEDURE — 85018 HEMOGLOBIN: CPT | Performed by: INTERNAL MEDICINE

## 2018-04-27 PROCEDURE — A9270 NON-COVERED ITEM OR SERVICE: HCPCS | Mod: GY | Performed by: INTERNAL MEDICINE

## 2018-04-27 PROCEDURE — 99207 ZZC CDG-MDM COMPONENT: MEETS LOW - DOWN CODED: CPT | Performed by: INTERNAL MEDICINE

## 2018-04-27 PROCEDURE — 82330 ASSAY OF CALCIUM: CPT | Performed by: INTERNAL MEDICINE

## 2018-04-27 PROCEDURE — 85027 COMPLETE CBC AUTOMATED: CPT | Performed by: INTERNAL MEDICINE

## 2018-04-27 PROCEDURE — 36415 COLL VENOUS BLD VENIPUNCTURE: CPT | Performed by: INTERNAL MEDICINE

## 2018-04-27 PROCEDURE — G0500 MOD SEDAT ENDO SERVICE >5YRS: HCPCS | Performed by: INTERNAL MEDICINE

## 2018-04-27 PROCEDURE — 25000125 ZZHC RX 250: Performed by: INTERNAL MEDICINE

## 2018-04-27 PROCEDURE — 0W3P8ZZ CONTROL BLEEDING IN GASTROINTESTINAL TRACT, VIA NATURAL OR ARTIFICIAL OPENING ENDOSCOPIC: ICD-10-PCS | Performed by: INTERNAL MEDICINE

## 2018-04-27 PROCEDURE — 99232 SBSQ HOSP IP/OBS MODERATE 35: CPT | Performed by: INTERNAL MEDICINE

## 2018-04-27 RX ORDER — FENTANYL CITRATE 50 UG/ML
25 INJECTION, SOLUTION INTRAMUSCULAR; INTRAVENOUS EVERY 5 MIN PRN
Status: DISPENSED | OUTPATIENT
Start: 2018-04-27 | End: 2018-04-28

## 2018-04-27 RX ORDER — HALOPERIDOL 5 MG/ML
2 INJECTION INTRAMUSCULAR EVERY 6 HOURS PRN
Status: DISCONTINUED | OUTPATIENT
Start: 2018-04-27 | End: 2018-05-04 | Stop reason: HOSPADM

## 2018-04-27 RX ORDER — FLUMAZENIL 0.1 MG/ML
0.2 INJECTION, SOLUTION INTRAVENOUS
Status: ACTIVE | OUTPATIENT
Start: 2018-04-27 | End: 2018-04-29

## 2018-04-27 RX ORDER — QUETIAPINE FUMARATE 25 MG/1
25-50 TABLET, FILM COATED ORAL EVERY 6 HOURS PRN
Status: DISCONTINUED | OUTPATIENT
Start: 2018-04-27 | End: 2018-05-04 | Stop reason: HOSPADM

## 2018-04-27 RX ORDER — LIDOCAINE 40 MG/G
CREAM TOPICAL
Status: DISCONTINUED | OUTPATIENT
Start: 2018-04-27 | End: 2018-05-04 | Stop reason: HOSPADM

## 2018-04-27 RX ORDER — MIRTAZAPINE 30 MG/1
30 TABLET, ORALLY DISINTEGRATING ORAL AT BEDTIME
Status: DISCONTINUED | OUTPATIENT
Start: 2018-04-27 | End: 2018-05-04 | Stop reason: HOSPADM

## 2018-04-27 RX ORDER — DIAZEPAM 5 MG
10 TABLET ORAL EVERY 30 MIN PRN
Status: DISCONTINUED | OUTPATIENT
Start: 2018-04-27 | End: 2018-05-04 | Stop reason: HOSPADM

## 2018-04-27 RX ORDER — FENTANYL CITRATE 50 UG/ML
50 INJECTION, SOLUTION INTRAMUSCULAR; INTRAVENOUS
Status: COMPLETED | OUTPATIENT
Start: 2018-04-27 | End: 2018-04-27

## 2018-04-27 RX ORDER — DIAZEPAM 10 MG/2ML
5-10 INJECTION, SOLUTION INTRAMUSCULAR; INTRAVENOUS EVERY 30 MIN PRN
Status: DISCONTINUED | OUTPATIENT
Start: 2018-04-27 | End: 2018-05-04 | Stop reason: HOSPADM

## 2018-04-27 RX ORDER — TAMSULOSIN HYDROCHLORIDE 0.4 MG/1
0.4 CAPSULE ORAL DAILY
Status: DISCONTINUED | OUTPATIENT
Start: 2018-04-27 | End: 2018-05-04 | Stop reason: HOSPADM

## 2018-04-27 RX ORDER — NALOXONE HYDROCHLORIDE 0.4 MG/ML
.1-.4 INJECTION, SOLUTION INTRAMUSCULAR; INTRAVENOUS; SUBCUTANEOUS
Status: DISCONTINUED | OUTPATIENT
Start: 2018-04-27 | End: 2018-04-28

## 2018-04-27 RX ADMIN — HYDROMORPHONE HYDROCHLORIDE 0.5 MG: 1 INJECTION, SOLUTION INTRAMUSCULAR; INTRAVENOUS; SUBCUTANEOUS at 15:49

## 2018-04-27 RX ADMIN — Medication 5 MG: at 21:21

## 2018-04-27 RX ADMIN — SODIUM CHLORIDE 8 MG/HR: 9 INJECTION, SOLUTION INTRAVENOUS at 05:43

## 2018-04-27 RX ADMIN — Medication 5 MG: at 14:37

## 2018-04-27 RX ADMIN — MIRTAZAPINE 30 MG: 30 TABLET, ORALLY DISINTEGRATING ORAL at 21:21

## 2018-04-27 RX ADMIN — LORAZEPAM 1 MG: 2 INJECTION INTRAMUSCULAR; INTRAVENOUS at 02:41

## 2018-04-27 RX ADMIN — Medication 5 MG: at 16:59

## 2018-04-27 RX ADMIN — LIDOCAINE HYDROCHLORIDE 10 ML: 20 JELLY TOPICAL at 19:46

## 2018-04-27 RX ADMIN — ONDANSETRON 4 MG: 2 INJECTION INTRAMUSCULAR; INTRAVENOUS at 13:17

## 2018-04-27 RX ADMIN — CEFTRIAXONE 1 G: 1 INJECTION, POWDER, FOR SOLUTION INTRAMUSCULAR; INTRAVENOUS at 18:27

## 2018-04-27 RX ADMIN — MIDAZOLAM HYDROCHLORIDE 1 MG: 1 INJECTION, SOLUTION INTRAMUSCULAR; INTRAVENOUS at 09:43

## 2018-04-27 RX ADMIN — TAMSULOSIN HYDROCHLORIDE 0.4 MG: 0.4 CAPSULE ORAL at 21:21

## 2018-04-27 RX ADMIN — Medication 5 MG: at 12:19

## 2018-04-27 RX ADMIN — MIDAZOLAM HYDROCHLORIDE 2 MG: 1 INJECTION, SOLUTION INTRAMUSCULAR; INTRAVENOUS at 09:34

## 2018-04-27 RX ADMIN — PROCHLORPERAZINE EDISYLATE 10 MG: 5 INJECTION INTRAMUSCULAR; INTRAVENOUS at 15:36

## 2018-04-27 RX ADMIN — HYDROMORPHONE HYDROCHLORIDE 0.5 MG: 1 INJECTION, SOLUTION INTRAMUSCULAR; INTRAVENOUS; SUBCUTANEOUS at 01:06

## 2018-04-27 RX ADMIN — HYDROMORPHONE HYDROCHLORIDE 0.5 MG: 1 INJECTION, SOLUTION INTRAMUSCULAR; INTRAVENOUS; SUBCUTANEOUS at 04:13

## 2018-04-27 RX ADMIN — Medication 5 MG: at 08:48

## 2018-04-27 RX ADMIN — OCTREOTIDE ACETATE 50 MCG/HR: 200 INJECTION, SOLUTION INTRAVENOUS; SUBCUTANEOUS at 23:11

## 2018-04-27 RX ADMIN — FENTANYL CITRATE 100 MCG: 50 INJECTION, SOLUTION INTRAMUSCULAR; INTRAVENOUS at 09:35

## 2018-04-27 RX ADMIN — HYDROMORPHONE HYDROCHLORIDE 0.5 MG: 1 INJECTION, SOLUTION INTRAMUSCULAR; INTRAVENOUS; SUBCUTANEOUS at 23:22

## 2018-04-27 RX ADMIN — FOLIC ACID: 5 INJECTION, SOLUTION INTRAMUSCULAR; INTRAVENOUS; SUBCUTANEOUS at 00:59

## 2018-04-27 RX ADMIN — CALCIUM GLUCONATE 1 G: 98 INJECTION, SOLUTION INTRAVENOUS at 12:04

## 2018-04-27 RX ADMIN — DIAZEPAM 10 MG: 5 TABLET ORAL at 23:01

## 2018-04-27 RX ADMIN — HALOPERIDOL LACTATE 2 MG: 5 INJECTION, SOLUTION INTRAMUSCULAR at 23:15

## 2018-04-27 RX ADMIN — SODIUM CHLORIDE 8 MG/HR: 9 INJECTION, SOLUTION INTRAVENOUS at 17:06

## 2018-04-27 RX ADMIN — SODIUM CHLORIDE: 9 INJECTION, SOLUTION INTRAVENOUS at 16:49

## 2018-04-27 RX ADMIN — Medication 5 MG: at 18:55

## 2018-04-27 RX ADMIN — FOLIC ACID: 5 INJECTION, SOLUTION INTRAMUSCULAR; INTRAVENOUS; SUBCUTANEOUS at 21:56

## 2018-04-27 RX ADMIN — ONDANSETRON 4 MG: 2 INJECTION INTRAMUSCULAR; INTRAVENOUS at 22:03

## 2018-04-27 ASSESSMENT — PAIN DESCRIPTION - DESCRIPTORS
DESCRIPTORS: HEADACHE
DESCRIPTORS: CONSTANT;DISCOMFORT;THROBBING
DESCRIPTORS: HEADACHE
DESCRIPTORS: CONSTANT;NAGGING;DISCOMFORT
DESCRIPTORS: HEADACHE
DESCRIPTORS: ACHING
DESCRIPTORS: SHARP;CONSTANT;THROBBING
DESCRIPTORS: DISCOMFORT

## 2018-04-27 NOTE — H&P
Admitted:     04/26/2018      PRIMARY CARE PHYSICIAN:  Talon Elias MD with [facility]      CHIEF COMPLAINT:  Hematemesis.      HISTORY OF PRESENT ILLNESS:  Mr. Jim Richard is a 63-year-old  male with past medical history significant for alcohol dependence, active tobacco use, COPD, peripheral artery disease, hypertension, dyslipidemia, depression, anxiety, BPH, chronic back pain, spinal stenosis, obstructive sleep apnea and umbilical hernia, and recent hospitalization for hematemesis secondary to esophageal varices with his alcoholic liver disease who presented to the emergency room after an episode of hematemesis today that happened at 7 p.m. this evening.  He threw up bright red blood with dark spots in it,  as per the patient.  He has been having intermittent headaches for the last 1 week.  He was seen in the emergency room on 04/21/2018 and had a CT scan of the head done. He was also intoxicated during that.  CT scan of the head returned negative and he got discharged home. To help with the headaches, he started taking ibuprofen.  He told the ER physician that he has been taking ibuprofen daily, but for me, he said he only took 2 tablets of ibuprofen today once and he felt dizzy and lightheaded all day.  Since discharge, he has been drinking every day, 0.75 liters of vodka.  His last drink was at 10 p.m. last night.  Per EMS, he had almost 2000 mL of bloody emesis, so he had IV placed en route and received 500 mL bolus.  He appeared very tremulous and pale. In the emergency room, his heart rate was in the 120s on arrival.  His blood pressure remains stable with systolics in the 100s-110s over 50s.  Hemoglobin was low on blood work at 5.8 on arrival.        He was seen by Dr. Joya Mondragon and he was given normal saline 2 liters of fluid and 40 mg of Protonix IV, 50 mcg of octreotide and Zofran to help with nausea.  Ceftriaxone IV was given as well to prevent transmigration of the  [facility] was called and they recommended hospitalization and stabilization, and they would see him if he continues to bleed, so a request for hospitalization was made.        He is tremulous and shaky and complains of head pain when I examined him.  He is otherwise resting comfortably with a blood transfusion going in currently in the emergency room.  He has a history of known chronic alcoholic dependence.  He had 6-7 inpatient treatments, as per the patient.  He was recently hospitalized from 03/21/2018-03/24/2018 with hematemesis.  Gastroenterology consultation was obtained and he underwent an EGD procedure on 03/22/2018. That showed nonbleeding grade 1 esophageal varices, possible limited Corrales's tissue in distal esophagus, not biopsied at this time due to recent upper GI bleeding, no sign of Jessica-Martinez tear was seen, gastritis, duodenal erosions without bleeding.  No specimen collected. They thought the varices might be the source of the bleeding and then it stopped at the time of endoscopy.        PAST MEDICAL HISTORY:    1.  Alcohol dependence with previous inpatient chemical dependency treatment 5-6 times, as per the patient.  Continues to drink daily.   2.  Active tobacco use.   3.  Alcoholic liver disease with possible cirrhosis.   4.  Esophageal varices with a history of hematemesis.   5.  Obstructive sleep apnea.  He uses a CPAP at night.   6.  Chronic back pain and spinal stenosis.   7.  Peripheral vascular disease, status post bypass surgery in 06/2012.   8.  COPD.   9.  BPH.   10.  Umbilical hernia.   11.  Hypertension.   12.  Dyslipidemia.   13.  Depression and anxiety.     14.  Normal nuclear cardiac stress test and echocardiogram with normal ejection fraction in 09/2014.   15.  History of alcohol withdrawals in the past.      PAST SURGICAL HISTORY:  Appendectomy, tonsillectomy and adenoidectomy, left external iliac artery to common femoral bifurcation and left femoral to ___  popliteal bypass graft in 2012, femoral artery endarterectomy, L3 to S1 laminectomy in , right eye cataract surgery.      FAMILY HISTORY:  Father with lung cancer, mother with lymphoma and CHF.  Father  at the age of 94.      SOCIAL HISTORY:  He is single.  He lives with his roommates.  He has 40-pack-year history of smoking, currently smokes 1 pack a day.  Continues to drink alcohol, 1 pint of vodka daily.      PRIOR TO ADMISSION MEDICATIONS:   1.  Albuterol 2 puffs into the lungs every 6 hours as needed.   2.  Atorvastatin 10 mg p.o. at bedtime.   3.  Cymbalta 60 mg p.o. daily.    4.  Breo Ellipta 1 puff into the lungs daily as needed.   5.  Gabapentin 900 mg p.o. 3 times daily.   6.  Hydroxyzine 50 mg p.o. nightly as needed.   7.  Robaxin 750 mg p.o. 2 times daily as needed for muscle spasms.   8.  Mirtazapine 30 mg p.o. at bedtime.   9.  Omeprazole 40 mg p.o. 2 times daily.     10.  Pred Forte 1 drop into the right eye 2 times daily.    11.  Quetiapine 50 mg p.o. 3 times daily as needed.   12.  Spironolactone/hydrochlorothiazide 25/25 mg 1 tablet p.o. daily.   13.  Tamsulosin 0.4 mg p.o. daily.   14.  Trazodone 200 mg p.o. at bedtime.      ALLERGIES:  NO KNOWN DRUG ALLERGIES.      REVIEW OF SYSTEMS:  Ten-point review of systems was done and is negative except as in HPI.      PHYSICAL EXAMINATION:   VITAL SIGNS:  His temperature is 98.8 degrees Fahrenheit, pulse rate is 105, blood pressure is 109/53, respiratory rate 13-20.  He is satting 94% on room air.   GENERAL:  He is alert, oriented x 3, tremulous and shaky, pale appearing, pleasant male lying comfortably in bed, not in any acute distress.   HEENT:  Atraumatic, normocephalic. Conjunctival pallor noticed.  Otherwise, extraocular movements are normal.  Pupils are equal, round and reactive to light.   NECK:  Supple.   HEART:  S1, S2 heard, sinus tachycardia, no murmurs, rubs or gallops.   LUNGS:  Clear to auscultation.  No rales, rhonchi or  wheezing.   ABDOMEN:  Soft, distended, no guarding, rigidity or rebound, nontender.  Bowel sounds positive.   EXTREMITIES:  No clubbing, cyanosis or edema.   SKIN:  Warm and dry.   PSYCHIATRIC:  Mood and affect are normal.      LABORATORY AND RADIOLOGICAL INVESTIGATIONS:  A 12-lead EKG showed normal sinus rhythm with right bundle branch block.        CBC showed WBC count of 6.6, hemoglobin is low at 5.8, platelet count is at 151,000.  INR is elevated at 1.52, PTT at 27.  Blood grouping typing is A negative.  Sodium 140, potassium is normal at 4.1, chloride is normal at 106, bicarbonate 22, BUN 41, creatinine is normal at 0.91.  His albumin is 2.7, total bilirubin is 1.1, alkaline phosphatase is 166, ALT 48, AST 68.  Troponin is less than 0.015.  Glucose 172.      ASSESSMENT AND PLAN:  A 63-year-old male with history of alcoholic dependence and alcoholic liver disease, esophageal varices, COPD, ongoing tobacco and alcohol use, presenting with:   1.  Hematemesis, most likely secondary to variceal bleeding versus gastritis in the setting of ibuprofen use and alcohol use contributing.  He will be hospitalized, n.p.o.,  hemoglobin checks q.4 hours. He is getting 2 units of blood in the emergency room. Conditional  unit to transfuse if hemoglobin less than 7.  Order has been placed.  He will be on Protonix drip and octreotide drip and ceftriaxone IV daily to prevent bacterial transmigration.  [facility] consultation has been obtained.  IV normal saline at 100 mL per hour.  He is currently hemodynamically stable.  If he becomes unstable, will call [facility] to come in and do an EGD sooner than later. He might benefit from an angiogram and embolization if his bleeding gets worse.   2.  History of chronic obstructive pulmonary disease, currently stable.   3.  Alcoholic liver disease.  The patient was advised to abstain from alcohol.  We will get Psych and Chem Dep consultations.   4.  Alcohol dependence and withdrawal.   He will be placed on CIWA protocol with p.r.n. Ativan dosing. IV fluids with multivitamin, thiamine and folate will be provided.   5.  Hyperlipidemia.  We will hold the atorvastatin while he is n.p.o.   6.  Depression.  Will hold the Cymbalta, mirtazapine and Seroquel.    7.  Headaches.  Will be treated with low-dose oxycodone and IV Dilaudid p.r.n.   8.  CODE STATUS:  DISCUSSED WITH THE PATIENT AND HE WANTS TO BE DNR/DNI.     9.  Deep vein thrombosis prophylaxis with PCDs in the setting of GI bleed.    10.  GI prophylaxis.  He is going to be on a Protonix drip due to the GI bleed.       Greater than 60 minutes were spent in reviewing his medical records and taking care of him, half of which was spent in face-to-face with the patient and in collaboration of care.         JODY PONCE MD             D: 2018   T: 2018   MT:       Name:     DEVONTE SEGURA   MRN:      [id number]        Account:      [id number]   :      1955        Admitted:     2018                   Document: [id number]

## 2018-04-27 NOTE — CONSULTS
[facility] Initial Psychiatric Consult Note      TIME SPENT IN PSYCHIATRY INITIAL CONSULT: 55 MINUTES    Consult ordered by: Raquel Chinchilla MD.  Reason: Alcohol use.     Initial History      The patient's care was discussed, patient seen and chart notes were reviewed.    Patient examined for psychiatric consultation.     IDENTIFICATION    Pt is a 63 year old single  male currently living in [place]. Pt sees PCP Dr. Melara, [facility] Romo [place]. Unknown if he presently sees a psychiatrist, has seen Dr. Pritchett in the past. Pt seen on ICU for alcohol abuse.    HISTORY OF PRESENT ILLNESS    Mr. Jim Richard is a 63 year old male with an extensive history of alcohol use, alcoholic liver disease with esophageal varices, depression, anxiety, chronic back pain, and obstructive sleep apnea who presented to the [facility] ED due to an episode of hematemesis. He apparently threw up bright red blood with dark spots. He has been having intermittent headaches for the past week, he had been taking Ibuprofen without relief. He has apparently been drinking 3/4 of a liter of vodka per day. He was recently hospitalized from 3/21/18-3/24/18 with hematemesis, underwent EDG which revealed nonbleeding grade 1 esophageal varices and recent upper GI bleed. Pt presented to the ED on 4/21/18, CT completed at that time was negative and he was also intoxicated. He was ultimately discharged home as CT was negative.  Pt endorses symptoms related to excessive alcohol use, including over drinking, unsuccessful attempts to curb use, craving its use, having it interfere with work and personal relationships, and continued use despite known bad consequences. Pt also has increased tolerance.  Pt has experienced withdrawal symptoms including tremors, insomnia, DTs, and increased anxiety.  Pt has severely depressed mood, motivation poor, concentration poor, low energy, hopeless, helpless, and worthless, no plan, able to contract for  safety. Pt endorses poor sleep architecture. Pt is an anxious person, a worrier.      CHEMICAL DEPENDENCY HISTORY    Pt has a storied history of EtOH abuse. He has had three prior formal CD treatments, most recently at [facility] for 60 days. He states he was sober for four months, longest sober period recently. He had been involved in AA in the past, but has not been to meetings in recent weeks. He does not endorse other illicit substance use. Utox was negative on admission.     PAST PSYCHIATRIC HISTORY    Pt states he is currently maintained on Cymbalta, Remeron, Trazodone, and Seroquel. He was previously started on Remeron, but has not continued this since he was discharged in October of 2016 as his current psychiatrist had felt that this was not needed. He has been seen by Dr. Rand at [facility] in 2017, 2016, and 2015, both for alcohol issues and depressive symptoms. He was previously seen at [facility] in 2017 and 2014 as well.     FAMILY HISTORY    No history or mental illness or substance abuse reported.    SOCIAL HISTORY    The patient grew up in [place], raised by both parents.  One of 2 siblings.  He graduated from high school.  He completed technical college.  Worked as a printer for a number of years.  They have been  for 28 years,  a year and a half ago.  His youngest has fragile X syndrome residing in a group home.  The patient states he has not worked in 7 months, probably more than that.       Medications     Prescriptions Prior to Admission   Medication Sig Dispense Refill Last Dose     albuterol (PROAIR HFA/PROVENTIL HFA/VENTOLIN HFA) 108 (90 BASE) MCG/ACT Inhaler Inhale 2 puffs into the lungs every 6 hours as needed    prn med     ATORVASTATIN CALCIUM PO Take 10 mg by mouth At Bedtime    3/20/2018 at pm     DULoxetine (CYMBALTA) 60 MG EC capsule Take 1 capsule (60 mg) by mouth daily 30 capsule 0 3/21/2018 at am     fluticasone-vilanterol (BREO ELLIPTA) 200-25 MCG/INH oral  inhaler Inhale 1 puff into the lungs daily as needed    prn med     GABAPENTIN PO Take 900 mg by mouth 3 times daily as needed   Past Month at Unknown time     hydrOXYzine (ATARAX) 25 MG tablet Take 2 tablets (50 mg) by mouth nightly as needed (sleep) 30 tablet 0      methocarbamol (ROBAXIN) 750 MG tablet Take 1 tablet (750 mg) by mouth 3 times daily as needed for muscle spasms 15 tablet 0      MIRTAZAPINE PO Take 30 mg by mouth At Bedtime   3/20/2018 at hs     omeprazole (PRILOSEC) 40 MG capsule Take 1 capsule (40 mg) by mouth 2 times daily (before meals) Take 30-60 minutes before a meal. 60 capsule 1      prednisoLONE acetate (PRED FORTE) 1 % ophthalmic susp Place 1 drop into the right eye 2 times daily X 7 more days. Post cataract   3/21/2018 at am x 1 dose     QUETIAPINE FUMARATE PO Take 50 mg by mouth 3 times daily as needed   prn med     spironolactone-hydrochlorothiazide (ALDACTAZIDE) 25-25 MG per tablet Take 1 tablet by mouth daily   3/21/2018 at am     TAMSULOSIN HCL PO Take 0.4 mg by mouth daily   3/21/2018 at am     TRAZODONE HCL PO Take 200 mg by mouth At Bedtime (Takes 2 x 100mg for a total of 200mg at bedtime)   3/20/2018 at hs       Scheduled Medications    cefTRIAXone  1 g Intravenous Q24H     fluticasone-vilanterol  1 puff Inhalation Daily     IV Fluid with vitamins   Intravenous Q24H     PRNs:  acetaminophen, albuterol, HYDROmorphone, LORazepam **OR** LORazepam, melatonin, naloxone, ondansetron **OR** ondansetron, oxyCODONE IR, polyethylene glycol, potassium chloride, potassium chloride with lidocaine, potassium chloride, potassium chloride, potassium chloride, prochlorperazine **OR** prochlorperazine **OR** prochlorperazine      Allergies      No Known Allergies     Previous Medical History     Past Medical History:   Diagnosis Date     Alcoholism (H) 1/14/2013     Anxiety      Coronary artery disease      Depression      Heart attack      Hepatomegaly      Hyperlipemia      Hypertension       "Peripheral vascular disease (H)      PVD (peripheral vascular disease) (H)      Sleep apnea      Spinal stenosis         Medical Review of Systems     /71  Pulse 84  Temp 98.8  F (37.1  C)  Resp 18  Ht 1.702 m (5' 7\")  Wt 92.6 kg (204 lb 2.3 oz)  SpO2 97%  BMI 31.97 kg/m2  Body mass index is 31.97 kg/(m^2).    Previous 10-point ROS completed by Raquel Chinchilla MD. on 4/26/18 reviewed by Brody Rand MD on April 27, 2018 and is unchanged except for those problems mentioned within the HPI.      Mental Status Examination     Appearance Lying in bed, dressed in gown. Appears stated age.   Attitude Cooperative, tired   Orientation Oriented to person, place, time   Eye Contact Poor   Speech Lowered rate, rhythm, volume and tone   Language Normal   Psychomotor Behavior Normal   Thought Process Intact   Associations Intact   Thought Content Patient is currently negative for suicidal ideation, negative for plan or intent, able to contract no self harm and identify barriers to suicide.  Negative for obsessions, compulsions or psychosis.      Mood Depressed   Affect Flat   Fund of Knowledge Average   Insight Impaired   Judgement Impaired   Attention Span & Concentration Limited   Recent & Remote Memory Limited   Gait Unable to assess   Muscle Tone Weak on visual inspection      Labs     Labs reviewed.  Recent Results (from the past 24 hour(s))   CBC with platelets differential    Collection Time: 04/26/18  7:40 PM   Result Value Ref Range    WBC 6.6 4.0 - 11.0 10e9/L    RBC Count 2.30 (L) 4.4 - 5.9 10e12/L    Hemoglobin 5.8 (LL) 13.3 - 17.7 g/dL    Hematocrit 18.7 (L) 40.0 - 53.0 %    MCV 81 78 - 100 fl    MCH 25.2 (L) 26.5 - 33.0 pg    MCHC 31.0 (L) 31.5 - 36.5 g/dL    RDW 20.0 (H) 10.0 - 15.0 %    Platelet Count 151 150 - 450 10e9/L    Diff Method Automated Method     % Neutrophils 84.1 %    % Lymphocytes 8.9 %    % Monocytes 6.4 %    % Eosinophils 0.0 %    % Basophils 0.3 %    % Immature Granulocytes 0.3 " %    Nucleated RBCs 0 0 /100    Absolute Neutrophil 5.6 1.6 - 8.3 10e9/L    Absolute Lymphocytes 0.6 (L) 0.8 - 5.3 10e9/L    Absolute Monocytes 0.4 0.0 - 1.3 10e9/L    Absolute Eosinophils 0.0 0.0 - 0.7 10e9/L    Absolute Basophils 0.0 0.0 - 0.2 10e9/L    Abs Immature Granulocytes 0.0 0 - 0.4 10e9/L    Absolute Nucleated RBC 0.0    ABO/Rh type and screen    Collection Time: 04/26/18  7:40 PM   Result Value Ref Range    Units Ordered 2     ABO A     RH(D) Neg     Antibody Screen Neg     Test Valid Only At [facility]        Specimen Expires 04/29/2018     Crossmatch Red Blood Cells    INR    Collection Time: 04/26/18  7:40 PM   Result Value Ref Range    INR 1.52 (H) 0.86 - 1.14   Partial thromboplastin time    Collection Time: 04/26/18  7:40 PM   Result Value Ref Range    PTT 27 22 - 37 sec   Comprehensive metabolic panel    Collection Time: 04/26/18  7:40 PM   Result Value Ref Range    Sodium 140 133 - 144 mmol/L    Potassium 4.1 3.4 - 5.3 mmol/L    Chloride 106 94 - 109 mmol/L    Carbon Dioxide 22 20 - 32 mmol/L    Anion Gap 12 3 - 14 mmol/L    Glucose 172 (H) 70 - 99 mg/dL    Urea Nitrogen 41 (H) 7 - 30 mg/dL    Creatinine 0.91 0.66 - 1.25 mg/dL    GFR Estimate 84 >60 mL/min/1.7m2    GFR Estimate If Black >90 >60 mL/min/1.7m2    Calcium 7.7 (L) 8.5 - 10.1 mg/dL    Bilirubin Total 1.1 0.2 - 1.3 mg/dL    Albumin 2.7 (L) 3.4 - 5.0 g/dL    Protein Total 5.5 (L) 6.8 - 8.8 g/dL    Alkaline Phosphatase 166 (H) 40 - 150 U/L    ALT 48 0 - 70 U/L    AST 68 (H) 0 - 45 U/L   Troponin I    Collection Time: 04/26/18  7:40 PM   Result Value Ref Range    Troponin I ES <0.015 0.000 - 0.045 ug/L   Alcohol level blood    Collection Time: 04/26/18  7:40 PM   Result Value Ref Range    Ethanol g/dL <0.01 <0.01 g/dL   Blood component    Collection Time: 04/26/18  7:40 PM   Result Value Ref Range    Unit Number [id number]     Blood Component Type Red Blood Cells LeukoReduced (Part 2)     Division Number 00     Status  of Unit Released to care unit     Blood Product Code [id number]     Unit Status ISS    Blood component    Collection Time: 04/26/18  7:40 PM   Result Value Ref Range    Unit Number [id number]     Blood Component Type Red Blood Cells Leukocyte Reduced     Division Number 00     Status of Unit Released to care unit     Blood Product Code [id number]     Unit Status ISS    Blood component    Collection Time: 04/26/18  7:40 PM   Result Value Ref Range    Unit Number [id number]     Blood Component Type Red Blood Cells Leukocyte Reduced     Division Number 00     Status of Unit Released to care unit 04/27/2018 0325     Blood Product Code [id number]     Unit Status ISS    EKG 12 lead    Collection Time: 04/26/18  7:46 PM   Result Value Ref Range    Interpretation ECG Click View Image link to view waveform and result    Glucose by meter    Collection Time: 04/26/18 10:26 PM   Result Value Ref Range    Glucose 141 (H) 70 - 99 mg/dL   Hemoglobin    Collection Time: 04/27/18  2:45 AM   Result Value Ref Range    Hemoglobin 6.7 (LL) 13.3 - 17.7 g/dL   Basic metabolic panel    Collection Time: 04/27/18  6:10 AM   Result Value Ref Range    Sodium 142 133 - 144 mmol/L    Potassium 4.2 3.4 - 5.3 mmol/L    Chloride 111 (H) 94 - 109 mmol/L    Carbon Dioxide 24 20 - 32 mmol/L    Anion Gap 7 3 - 14 mmol/L    Glucose 109 (H) 70 - 99 mg/dL    Urea Nitrogen 48 (H) 7 - 30 mg/dL    Creatinine 1.09 0.66 - 1.25 mg/dL    GFR Estimate 68 >60 mL/min/1.7m2    GFR Estimate If Black 82 >60 mL/min/1.7m2    Calcium 7.0 (L) 8.5 - 10.1 mg/dL   CBC with platelets    Collection Time: 04/27/18  6:10 AM   Result Value Ref Range    WBC 5.3 4.0 - 11.0 10e9/L    RBC Count 2.76 (L) 4.4 - 5.9 10e12/L    Hemoglobin 7.5 (L) 13.3 - 17.7 g/dL    Hematocrit 22.8 (L) 40.0 - 53.0 %    MCV 83 78 - 100 fl    MCH 27.2 26.5 - 33.0 pg    MCHC 32.9 31.5 - 36.5 g/dL    RDW 18.4 (H) 10.0 - 15.0 %    Platelet Count 87 (L) 150 - 450 10e9/L        Impression     This is a 63 year  old male with an extensive history of alcohol use and depression who presented with an episode of hematemesis and alcohol intoxication. Discussed starting pt on Remeron sublingual.  Reviewed the side effects, benefits, and complications of medication. Pt gave verbal agreement to begin Remeron Sol-tab 30mg qhs. Will change CIWA to Valium protocol with Seroquel and Haldol as there is a high likelihood for DTs and acute withdrawal symptoms during this admission.     Diagnoses     1. Major depression, recurrent, severe, without psychotic features.  2. Alcohol use disorder, severe.     Plan     1. Explained side effects, benefits and complications of medications to the patient.  2. Medication Changes: Begin Remeron Sol-tab 30mg qhs. Change CIWA to Valium protocol.  3. Discussed treatment plan with patient and team.  4. Re-consult Psychiatry.      TIME SPENT IN PSYCHIATRY INITIAL CONSULT: 55 MINUTES     Attestation:   Patient has been seen and evaluated by me, Brody Rand MD.    Patient ID:  Name: Jim Richard MRN: [id number]  Admission: 4/26/2018  YOB: 1955

## 2018-04-27 NOTE — PROGRESS NOTES
ICU Multi-Disciplinary Note  Patient condition reviewed and discussed while on multidisciplinary rounds today.  Admitted with GI bleed. Hx of ETOH abuse/dependence, alcoholic liver disease, and varices.  He reportedly had been drinking somewhere between 0.75 to 1.75 Liters of vodka daily as of late.  He began to have high output bloody emesis last night and was brought in by EMS.  His hgb was 5.8 on arrival.  He was transfused with PRBC and GI was consulted.  GI scoped this AM and found varices and placed 6 bands.   He has been hemodynamically stable throughout the day.    Due to significant ETOH abuse/dependence it is felt that patient will likely undergo ETOH withdrawal and thus, remains in ICU.    Recommend high dose gabapentin as well as low dose scheduled benzodiazepines.  Patient will stay in ICU for close monitoring.  He will remain on CIWA scoring and meds.        The Critical Care service will continue to follow peripherally while patient is within the ICU. We are readily available should issues arise.  Please feel free to contact us for critical care issues with which we may be of assistance. For all other concerns, please contact primary service first.     Radha Patel, RUTHIE

## 2018-04-27 NOTE — PROGRESS NOTES
EGD done at bedside w/ Dr Lim.  3mg versed and 100mcg fentanyl given total per order.  6 bands placed.  VSS, pt resting comfortably.

## 2018-04-27 NOTE — ED PROVIDER NOTES
History     Chief Complaint:  Hematemesis    HPI   Jim Richard is a 63 year old male with a history of alcoholism and recent admission for hematemesis.  who presents to the emergency department today via EMS for evaluation of hematemesis. Per chart review, the patient was recently admitted  on 3/21/18 for hematemesis.He had a hemoglobin of 7.4 and endoscopy and discharge summary as shown below. He was advised to discontinue alcohol and NSAID use, but has continued both.  He was here 4/21 for headache and had a CT that was negative and his hemoglobin was 9.8.. A EMS reports the patient is a chronic alcohol drinker and notes he drank a 1.75 liter bottle of vodka last night and had a mild headache. His last drink was last night. Today, he woke up with a severe headache around 0900 which is usual for him, and at about 1845, he started to vomit bright red blood. He was concerned about the bloody emesis prompting a call to EMS. On scene, EMS found 1-2 liters of bright red emesis and roommates noted he could have drank more than 1.75 liters. En route, his blood sugar was 224, heart rate was 120 and 18 gauge was placed in the right  AC. They started 500 mL of IV fluids. EMS noted that he was tremulous and pale.  He denies chest pain, abdominal pain, black or bloody stools, recent fall, tobacco use, and street drug use. Of note, the patient lives at home with roomates. He was advised to follow up with GI but says he has never seen one.     Upper GI Endoscopy (3/21/18)  Impression:               - Non-bleeding grade I esophageal varices.                             - Possible, limited Corrales's tissue in distal                             esophagus, not biopsied at this time, due to recent                             UGI bleeding.                             - No sign of a Jessica-Martinez tear was seen.                             - Gastritis.                             - Duodenal erosions without bleeding.                              - No specimens collected.                             - The exact site of recent upper GI bleeding was                             not seen on this examination.                             No Dieulafoy or AVMs seen.                             It is possible that the patient decompressed the                             varix with the hematemesis and fall in hemoglobin.     Discharge summary from 3/24:   History of Present Illness      Mr. Jim Richard is a 63-year-old  gentleman with a past medical history notable for alcohol dependence, active tobacco use, COPD, peripheral artery disease, hypertension, dyslipidemia, depression/anxiety, BPH, chronic back pain/spinal stenosis, JANIS, and umbilical hernia, who has presented to the Emergency Department via EMS for evaluation of hematemesis.  He states he was in his usual state of health until earlier today when he started vomiting a large amount of blood, which he quantifies as 1 pint, associated with generalized weakness, shortness of breath and excessive sweating.  The patient has no previous history of GI bleed and he reports no prior history of endoscopy.  On direct questioning, he denies chest pain, palpitations, abdominal pain, melena, hematochezia or urinary symptoms.  He states his appetite is well maintained and he reports no change in his weight recently.  He also denies edema in his lower extremities.       In the Emergency Department, the patient has been evaluated by Dr. Danae Obregon, the ER attending physician.  The hematology profile shows hemoglobin of 7.4, platelet count of 102,000, INR of 1.31.  His hemoglobin was 14.8 back in 08/2017.  The chemistry profile is significant for a slightly low sodium level of 130, abnormal liver function with ALT of 74, AST of 136 and total bilirubin of 0.9.         On direct questioning, he states that he drinks 1 pint of vodka every day.  His ethanol level in the ER is less than 0.01.   The CT scan of the head with contrast shows no acute intracranial pathology.  Chest x-ray is essentially unremarkable.  The electrocardiogram reveals normal sinus rhythm and rate of 97 beats per minute and right bundle branch block.  Consent has been obtained in the Emergency Department and he is being transfused with 1 unit of packed red blood cells.  I failed to mention that in the ER, his blood pressure was initially 100/64, heart rate 98 and he was saturating 98% on room air.         Hospital Course     Jim Richard was admitted on 3/21/2018.  The following problems were addressed during his hospitalization:     63-year-old  gentleman with a past medical history notable for alcohol dependence, active tobacco use, hypertension, dyslipidemia, obstructive sleep apnea, BPH, peripheral artery disease, COPD, depression/anxiety, who has presented to the Emergency Department via EMS for evaluation of a large amount of hematemesis.        Acute upper gastrointestinal bleed with Non-bleeding grade I esophageal varices. Possible, limited Corrales's tissue in distal esophagus, not biopsied at this time, due to recent UGI bleeding. No sign of a Jessica-Martinez tear was seen. Gastritis. Duodenal erosions without bleeding:  The patient has presented with a large amount of hematemesis, suggestive of upper GI bleed.  He has a longstanding history of alcohol abuse, which does raise the possibility of portal hypertension with complication including esophageal varices.  The liver function panel shows AST of 136, ALT of 74 and total bilirubin of 0.9 consistent with alcoholic liver disease.  His INR is 1.31, platelet count 102 and hemoglobin 7.4.  Notably, his hemoglobin and platelet counts were 14.8 and 189,000 on 07/10/2017.   - PPI BID.   - EGD completed 3/22, see report, as above.   - Avoid NSAIDs and anticoagulants/antiplatelet agents.   - Educated patient at length to avoid NSAIDS and alcohol, verbalized  understanding.       Alcoholic liver disease:  The liver function panel on admission shows elevated AST of 136, ALT of 74 and alkaline phosphatase of 172, but normal total bilirubin of 0.6.  Albumin level was 2.9.  He has coagulopathy with INR of 1.31 on admission.    - Close outpatient follow up.   - Liver u/s completed, see report, discussed with patient.   - Management deferred to GI Service.       Acute blood loss anemia due to upper GI bleed:  Management as above.   - Monitor as outpatient. Stable.       Thrombocytopenia:  Platelet count is 102,000 on admission.  It was previously normal in 07/2017.  suspect it is due to alcoholic liver disease.   - Monitor closely as outpatient.        Peripheral arterial disease:  The patient is status post left external iliac artery to common femoral bifurcation and left femoral to above-knee popliteal bypass graft as well as femoral artery endarterectomy in 06/2012.    - Hold his prior to admission aspirin.    - prior to admission atorvastatin.       Chronic obstructive pulmonary disease:  The patient is on p.o. Ellipta 1 puff daily as needed as well as albuterol every 6 hours as needed.    - Continue home regimen. Stable.       Depression/anxiety:  At home he is on Cymbalta 60 mg p.o. daily and Remeron 30 mg p.o. at bedtime and trazodone 200 mg p.o. at bedtime.    - Resume outpatient management established previously.       Hypertension:  At home he is on Aldactazide 25/25 mg p.o. Daily.   - Aldactazide.       Dyslipidemia: Monitor.  - prior to admission atorvastatin.       Obstructive sleep apnea: CPAP QHS      Benign prostatic hypertrophy:  Stable.   - prior to admission tamsulosin       Alcohol dependence:  The patient continues to drink alcohol heavily, 1 pint of vodka daily.  He is at risk of withdrawal.    - Discussed at length alcohol cessation, patient verbalized understanding.       Tobacco abuse:  He currently smokes 1 pack a day.    - Cessation discussed patient  verbalized understanding.       Allergies:  No Known Drug Allergies    Medications:    albuterol (PROAIR HFA/PROVENTIL HFA/VENTOLIN HFA) 108 (90 BASE) MCG/ACT Inhaler  ATORVASTATIN CALCIUM PO  DULoxetine (CYMBALTA) 60 MG EC capsule  fluticasone-vilanterol (BREO ELLIPTA) 200-25 MCG/INH oral inhaler  GABAPENTIN PO  hydrOXYzine (ATARAX) 25 MG tablet  methocarbamol (ROBAXIN) 750 MG tablet  MIRTAZAPINE PO  omeprazole (PRILOSEC) 40 MG capsule  prednisoLONE acetate (PRED FORTE) 1 % ophthalmic susp  QUETIAPINE FUMARATE PO  spironolactone-hydrochlorothiazide (ALDACTAZIDE) 25-25 MG per tablet  TAMSULOSIN HCL PO  TRAZODONE HCL PO    Past Medical History:    Alcoholism (H)  Anxiety   Coronary artery disease   Depression   Heart attack   Hepatomegaly   Hyperlipemia   Hypertension   Peripheral vascular disease (H)   PVD (peripheral vascular disease) (H)   Sleep apnea   Spinal stenosis     Past Surgical History:    APPENDECTOMY    BYPASS GRAFT FEMOROPOPLITEAL    COLONOSCOPY    ENDARTERECTOMY FEMORAL    ESOPHAGOSCOPY, GASTROSCOPY, DUODENOSCOPY (EGD), COMBINED   HERNIA REPAIR    LAMINECTOMY, FUSION LUMBAR THREE+ LEVEL, COMBINED   TONSILLECTOMY & ADENOIDECTOMY     Family History:    Mental illness  CAD  Cancer  Substance abuse    Social History:  The patient was alone.  Smoking Status: Current Every Day Smoker, 1 pack/day  Smokeless Tobacco: Never  Alcohol Use: Yes, 1.75 liters vodka daily  Lives at home with roommates.  Marital Status:  Single     Review of Systems   Cardiovascular: Negative for chest pain.   Gastrointestinal: Positive for vomiting (hematemesis). Negative for abdominal pain and blood in stool.   Skin: Positive for pallor.   Neurological: Positive for tremors and headaches.   All other systems reviewed and are negative.    Physical Exam     Patient Vitals for the past 24 hrs:   BP Temp Temp src Pulse Heart Rate Resp SpO2 Height Weight   04/26/18 2100 109/53 - - - 109 20 94 % - -   04/26/18 2055 112/73 98.8  F (37.1  " C) - - 92 13 - - -   04/26/18 2030 100/58 - - - 88 - - - -   04/26/18 2015 106/52 - - - 92 - 97 % - -   04/26/18 2006 117/43 - - - 105 15 94 % - -   04/26/18 2002 137/73 98.5  F (36.9  C) Oral 105 - 24 100 % 1.702 m (5' 7\") 93 kg (205 lb)         Physical Exam  Constitutional:  Oriented to person, place, and time.      Appears well-developed and well-nourished.      Tremulous, complaining of nausea, blood in his hair. Appears pale.   HENT:   Head:    Normocephalic and atraumatic.   Right Ear:   Tympanic membrane and external ear normal.   Left Ear:   Tympanic membrane and external ear normal.   Mouth/Throat:   Oropharynx is clear and moist.      Mucous membranes are normal.   Eyes:    Pale conjunctiva and EOM are normal.      Pupils are equal, round, and reactive to light.   Neck:    Normal range of motion. Neck supple.   Cardiovascular:  Tachycardia, regular rhythm, S1 normal and S2 normal.      No gallop and no friction rub. No murmur heard.  Pulmonary/Chest:  Breath sounds normal. No respiratory distress.      No wheezes. No rhonchi. No rales.   Abdominal:   Soft.  Hepatomegaly about three finger lengths below right costochondral region . No tenderness.      Distended abdomen     No rebound and no CVA tenderness.   Musculoskeletal:  Normal range of motion.   Neurological:   Alert and oriented to person, place, and time. Normal strength.     GCS eye subscore is 4. GCS verbal subscore is 5.      GCS motor subscore is 6.   Skin:    Skin is warm and dry.   Psychiatric:   Normal mood and affect.      Speech is normal and behavior is normal.      Judgment and thought content normal.      Cognition and memory are normal.     Emergency Department Course   ECG:  Indication: hematemesis  Completed at 1946.  Read at 1946.   Normal sinus rhythm  Right bundle branch block  Abnormal ECG   No significant change compared to EKG dates 3/21/18  Rate 98 bpm. NC interval 114. QRS duration 124. QT/QTc 402/513. P-R-T axes 42  2  " 25.    Laboratory:  Laboratory findings were communicated with the patient who voiced understanding of the findings.  CBC: HGB 5.8 (LL), o/w WNL. (WBC 6.6, )   CMP: glucose 172 (H), BUN 41 (H), calcium 7.7 (L), albumin 2.7 (L), AST 68 (H) o/w WNL (Creatinine 0.91)  Troponin (Collected 1940): <0.015  PTT: 27  INR: 1.52 (H)  Alcohol ethyl: <0.01  Blood Type: A-    Interventions:  1951 NS Bolus 1,000mL IV  1954 Ativan 1 mg IV  1957 Protonix 40 mg IV  1959 ceftriaxone 1 g IV  2002 NS Bolus 1,000mL IV  2023 Sandostatin 50 mcg IV  2029 Sandostatin 1250 mcg IV    Emergency Department Course:  Nursing notes and vitals reviewed.  1932: the patient arrived to room 21.   1933: The patient was transferred to stabilization room 3.   1933: I performed an exam of the patient as documented above.   1935: Patient rechecked and updated.   2002: I spoke with Dr. Conroy of the GI service from [facility] regarding patient's presentation, findings, and plan of care. He does not plan to see him right now, and wants him to be stabilized.   2033: Patient rechecked and updated.   2042: I spoke with Dr. Chinchilla of the hospitalist service regarding patient's presentation, findings, and plan of care.  2049: I spoke with Dr. Stratton of the intensivist service regarding patient's presentation, findings, and plan of care.  Findings and plan explained to the Patient who consents to admission. Discussed the patient with Dr. Chinchilla, who will admit the patient to an ICU bed for further monitoring, evaluation, and treatment.  I personally reviewed the laboratory and EKG results with the Patient and answered all related questions prior to admission.    Impression & Plan    Medical Decision Making:  The patient is a 63-year-old with history of alcohol abuse and recent admission for hematemesis who comes in with hematemesis today.  On recent hospitalization, his upper endoscopy showed grade 1 esophageal varices, gastritis and duodenal erosions  without obvious  source of bleeding found although they noted it could have been a varices that stopped bleeding.  He was advised to not drink, not take anti-inflammatories, and follow-up with GI but has not unfortunately adhered to any of that advice.  He has been drinking a liter per day and still taking anti-inflammatories for his headache.  He denies having known that he was supposed to follow-up with GI and says he has never seen a GI person.  Today he had one episode of bright red blood that he has vomited.  The paramedics estimate 1-2 L at the scene.  He was nauseous when he came here.  He appeared quite pale.  He was tachycardic per paramedics but heart rate has gone down with resuscitation here.  Hemoglobin is low as noted at 5.8. INR high at 1.5.  He also has elevated liver function tests and has run toward lower end of platelets.  He has been given management as noted above.  I called immediately for blood and he is getting blood transfused as I suspect his hemoglobin otherwise will trend lower.  It is 4 g lower than when he was here a few days ago.  The patient was tremulous and was given Ativan and became less tremulous.  He certainly is at risk for alcohol withdrawal.  I am unclear at this point what the source of his bleeding is which could be varices or stomach or duodenum.  He has been covered in case it is varices with Rocephin, octreotide.  He is also on Protonix drip.  I have talked to Dr. Conroy who recommends stabilization if possible and that he will come in if he becomes unstable and then will need to have endoscopy.  I spoke with Dr. Chinchilla who asked me to talk to the intensivist about admitting him but the intensivist asked that Dr. Chinchilla admit him although he will be aware.  Dr. Chinchilla has agreed and is here seeing the patient.    Assessment:   1. upper GI bleed unclear source  2. alcohol abuse  3. alcohol withdrawal  4. chronic headaches   5. nonsteroidal abuse    Critical Care time:  was 50 minutes for this  patient excluding procedures.    Diagnosis:    ICD-10-CM    1. Hematemesis K92.0        Disposition:  Admitted to ICU bed with Dr. Renée Toledo Disclosure:  IYao, am serving as a scribe at 8:01 PM on 4/26/2018 to document services personally performed by Joya Mondragon MD based on my observations and the provider's statements to me.     4/26/2018    EMERGENCY DEPARTMENT       Joya Mondragon MD  04/27/18 0002

## 2018-04-27 NOTE — PROGRESS NOTES
[facility]    Hospitalist Progress Note    Assessment & Plan   Jim Richard is a 63 year old male who was admitted on 4/26/2018.     63-year-old male with history of alcoholic dependence and alcoholic liver disease, esophageal varices, COPD, ongoing tobacco and alcohol use, presenting with:     1.  Hematemesis, most likely secondary to variceal bleeding versus gastritis in the setting of ibuprofen use and alcohol use contributing.   - n.p.o.,  hemoglobin checks q.4 hours  - 2 units of blood in the emergency room  -conditiona unit to transfuse if hemoglobin less than 7.  Order has been placed  -Protonix drip and octreotide drip and ceftriaxone IV daily to prevent bacterial transmigration  -[facility] consultation plans EGD today  -  IV normal saline at 100 mL per hour  -currently hemodynamically stable  -stay in ICU for now    2.  History of chronic obstructive pulmonary disease, currently stable.     3.  Alcoholic liver disease.  The patient was advised to abstain from alcohol  - Psych and Chem Dep consultations.     4.  Alcohol dependence and withdrawal  -  CIWA protocol with p.r.n. Ativan dosing  - IV fluids with multivitamin, thiamine and folate  -tremulous today    5.  Hyperlipidemia.  - hold the atorvastatin while he is n.p.o.     6.  Depression  - hold the Cymbalta, mirtazapine and Seroquel  -psych to see      7.  Headaches.  Will be treated with low-dose oxycodone and IV Dilaudid p.r.n.     8.  CODE STATUS:  DISCUSSED WITH THE PATIENT AND HE WANTS TO BE DNR/DNI.       9.  Deep vein thrombosis prophylaxis with PCDs in the setting of GI bleed.      10.  GI prophylaxis.  He is going to be on a Protonix drip due to the GI bleed.      # Pain Assessment:  Current Pain Score 4/27/2018   Patient currently in pain? sleeping: patient not able to self report   Pain score (0-10) -   Pain location -   Pain descriptors -   low dose oxycodone and dilaudid prn    DVT Prophylaxis: SCD  Code Status:  DNR/DNI    Disposition: Expected discharge 3-4 days depending on bleeding and etoh withdrawal    Text Page (7am - 6pm)  Alena Byrd MD    Interval History   Denies abd pain    -Data reviewed today: I reviewed all new labs and imaging results over the last 24 hours. I personally reviewed none    Physical Exam   Temp: 98.8  F (37.1  C) Temp src: Oral BP: 103/71 Pulse: 84 Heart Rate: 73 Resp: 18 SpO2: 97 % O2 Device: None (Room air)    Vitals:    04/26/18 2002 04/26/18 2238   Weight: 93 kg (205 lb) 92.6 kg (204 lb 2.3 oz)     Vital Signs with Ranges  Temp:  [98.5  F (36.9  C)-99.1  F (37.3  C)] 98.8  F (37.1  C)  Pulse:  [] 84  Heart Rate:  [] 73  Resp:  [6-24] 18  BP: ()/() 103/71  SpO2:  [92 %-100 %] 97 %  I/O last 3 completed shifts:  In: 679.67 [I.V.:679.67]  Out: 530 [Urine:530]    Constitutional: alert   Respiratory: clear to auscultation, no wheezing or rhonchi   Cardiovascular: regular rate and rhythm without murmer or rub   GI:positive bowel sounds, non-tender   Skin/Integumen: no rashes    Other:        Medications     octreotide (sandoSTATIN) infusion ADULT Stopped (04/26/18 2203)     pantoprazole (PROTONIX) infusion ADULT/PEDS GREATER than or EQUAL to 45 kg 8 mg/hr (04/27/18 0543)     sodium chloride 10 mL/hr at 04/27/18 0102       cefTRIAXone  1 g Intravenous Q24H     fluticasone-vilanterol  1 puff Inhalation Daily     IV Fluid with vitamins   Intravenous Q24H       Data     Recent Labs  Lab 04/27/18  0610 04/27/18  0245 04/26/18  1940 04/21/18  0319   WBC 5.3  --  6.6 6.1   HGB 7.5* 6.7* 5.8* 9.8*   MCV 83  --  81 79   PLT 87*  --  151 167   INR  --   --  1.52*  --      --  140 139   POTASSIUM 4.2  --  4.1 3.5   CHLORIDE 111*  --  106 101   CO2 24  --  22 24   BUN 48*  --  41* 18   CR 1.09  --  0.91 0.98   ANIONGAP 7  --  12 14   KEV 7.0*  --  7.7* 8.2*   *  --  172* 88   ALBUMIN  --   --  2.7* 3.6   PROTTOTAL  --   --  5.5* 7.4   BILITOTAL  --   --  1.1 0.8    ALKPHOS  --   --  166* 278*   ALT  --   --  48 79*   AST  --   --  68* 149*   TROPI  --   --  <0.015  --        No results found for this or any previous visit (from the past 24 hour(s)).

## 2018-04-27 NOTE — CONSULTS
GASTROENTEROLOGY CONSULTATION     Jim Richard   [address] [facility] [address]   63 year old male   Admission Date/Time: 4/26/2018   Encounter Date: 4/27/2018  Primary Care Provider: Vilma Melara [place]     Referring / Attending Physician: Raquel Chinchilla   We were asked to see the patient in consultation by Dr. Chinchilla for evaluation of hematemesis.     HPI: Jim Richard is a 63 year old male with a past medical history significant for EtOH dependence, tobacco use, COPD, PAD, HTN, dyslipidemia, depression, anxiety, BPH, chronic back pain, JANIS who was brought to the ED by EMS for evaluation of hematemesis. The patient was recently hospitalized here about one month ago with similar symptoms. He underwent EGD at that time and a small non bleeding varix was seen. After discharge he began drinking again, reportedly about 1.75 L of Vodka per day.   In the ED he was found to be tachycardic and appeared tremulous and pale.  Hgb was 5.8. He was given IV fluids, Ativan and blood overnight. He was admitted to the ICU and started on Octreotide and Protonix drips as well as Ceftriaxone. After blood his hgb has improved to 7.5.   This morning he continues to feel nauseous. He denies further vomiting. He denies abdominal pain, chest pain or dysphagia.     Past Medical History  Past Medical History:   Diagnosis Date     Alcoholism (H) 1/14/2013    had treatment at [facility]     Anxiety      Coronary artery disease      Depression     w/anxiety     Heart attack      Hepatomegaly      Hyperlipemia      Hypertension      Peripheral vascular disease (H)      PVD (peripheral vascular disease) (H)      Sleep apnea      Spinal stenosis        Past Surgical History  Past Surgical History:   Procedure Laterality Date     APPENDECTOMY       BYPASS GRAFT FEMOROPOPLITEAL  6/12/2012    Procedure: BYPASS GRAFT FEMOROPOPLITEAL;  LEFT FEMORAL TO ABOVE KNEE POPLITEAL BYPASS, ENDARTERECTOMY OF SFA AND PF ARTERIES,  LEFT EXTERNAL ILIAC TO COMMON FEMORAL INTERPOSITION GRAFT;  Surgeon: Damir Roberts MD;  Location:  OR     COLONOSCOPY       ENDARTERECTOMY FEMORAL  6/12/2012    Procedure: ENDARTERECTOMY FEMORAL;;  Surgeon: Damir Roberts MD;  Location:  OR     ESOPHAGOSCOPY, GASTROSCOPY, DUODENOSCOPY (EGD), COMBINED N/A 3/22/2018    Procedure: COMBINED ESOPHAGOSCOPY, GASTROSCOPY, DUODENOSCOPY (EGD);  ESOPHAGOSCOPY, GASTROSCOPY, DUODENOSOCPY.;  Surgeon: Valeri Pruitt MD;  Location:  OR     HERNIA REPAIR  2017    Abdominal     LAMINECTOMY, FUSION LUMBAR THREE+ LEVEL, COMBINED N/A 9/22/2014    Procedure: COMBINED LAMINECTOMY, FUSION LUMBAR THREE+ LEVEL;  Surgeon: Sebastien Pruitt MD;  Location:  OR     TONSILLECTOMY & ADENOIDECTOMY         Family History  Family History   Problem Relation Age of Onset     MENTAL ILLNESS Son      Coronary Artery Disease Early Onset Mother      Substance Abuse Father      CANCER Father      Substance Abuse Brother      Unknown/Adopted No family hx of      Depression No family hx of      Anxiety Disorder No family hx of      Schizophrenia No family hx of      Bipolar Disorder No family hx of      Suicide No family hx of      Dementia No family hx of      Tama Disease No family hx of      Parkinsonism No family hx of      Autism Spectrum Disorder No family hx of      Intellectual Disability (Mental Retardation) No family hx of        Social History  Social History     Social History     Marital status: Single     Spouse name: N/A     Number of children: N/A     Years of education: N/A     Occupational History     Not on file.     Social History Main Topics     Smoking status: Current Every Day Smoker     Packs/day: 1.00     Types: Cigarettes     Smokeless tobacco: Never Used     Alcohol use Yes      Comment: pt has been drinking 1/2-1 pint of vodka daily since Christmas     Drug use: No     Sexual activity: Not Currently     Other Topics Concern     Not on file     Social  History Narrative       Medications  Prior to Admission medications    Medication Sig Start Date End Date Taking? Authorizing Provider   albuterol (PROAIR HFA/PROVENTIL HFA/VENTOLIN HFA) 108 (90 BASE) MCG/ACT Inhaler Inhale 2 puffs into the lungs every 6 hours as needed     Unknown, Entered By History   ATORVASTATIN CALCIUM PO Take 10 mg by mouth At Bedtime     Unknown, Entered By History   DULoxetine (CYMBALTA) 60 MG EC capsule Take 1 capsule (60 mg) by mouth daily 2/11/17   Brody Rand MD   fluticasone-vilanterol (BREO ELLIPTA) 200-25 MCG/INH oral inhaler Inhale 1 puff into the lungs daily as needed     Unknown, Entered By History   GABAPENTIN PO Take 900 mg by mouth 3 times daily as needed    Unknown, Entered By History   hydrOXYzine (ATARAX) 25 MG tablet Take 2 tablets (50 mg) by mouth nightly as needed (sleep) 4/21/18   Dagoberto Garcia MD   methocarbamol (ROBAXIN) 750 MG tablet Take 1 tablet (750 mg) by mouth 3 times daily as needed for muscle spasms 4/21/18   Dagoberto Garcia MD   MIRTAZAPINE PO Take 30 mg by mouth At Bedtime    Unknown, Entered By History   omeprazole (PRILOSEC) 40 MG capsule Take 1 capsule (40 mg) by mouth 2 times daily (before meals) Take 30-60 minutes before a meal. 3/24/18   Piter Sequeira,    prednisoLONE acetate (PRED FORTE) 1 % ophthalmic susp Place 1 drop into the right eye 2 times daily X 7 more days. Post cataract    Unknown, Entered By History   QUETIAPINE FUMARATE PO Take 50 mg by mouth 3 times daily as needed    Unknown, Entered By History   spironolactone-hydrochlorothiazide (ALDACTAZIDE) 25-25 MG per tablet Take 1 tablet by mouth daily    Unknown, Entered By History   TAMSULOSIN HCL PO Take 0.4 mg by mouth daily    Unknown, Entered By History   TRAZODONE HCL PO Take 200 mg by mouth At Bedtime (Takes 2 x 100mg for a total of 200mg at bedtime)    Unknown, Entered By History       Allergies:  Review of patient's allergies indicates no known  "allergies.    ROS: A ten point review of systems was obtained and negative other than the symptoms noted above in the HPI.     Physical Exam:   /51  Pulse 84  Temp 98.8  F (37.1  C)  Resp 20  Ht 1.702 m (5' 7\")  Wt 92.6 kg (204 lb 2.3 oz)  SpO2 95%  BMI 31.97 kg/m2   Constitutional: chronically ill appearing , alert, no acute distress  Cardiovascular: tachycardic but regular rhythm, no murmurs,rubs or gallops  Respiratory: clear to auscultation bilaterally  Psychiatric: flat affect  Head: atraumatic, normocephalic  Neck: supple, no thyromegaly  ENT: mucous membranes are moist, no oral lesions are noted  Abdomen: soft, non-tender, non-distended, normally active bowel sound. No masses or hepatosplenomegaly is appreciated. No rebound tenderness or guarding  Neuro: Neurologically intact grossly  Skin: warm, dry, no rashes are noted    ADDITIONAL COMMENTS:   I reviewed the patient's new clinical lab test results.   Recent Labs   Lab Test  04/27/18   0610  04/27/18   0245  04/26/18 1940 04/21/18 0319 03/22/18   0430   03/21/18   1555   WBC  5.3   --   6.6  6.1   < >  4.7   --   6.0   HGB  7.5*  6.7*  5.8*  9.8*   < >  8.3*   < >  7.4*   MCV  83   --   81  79   < >  79   --   78   PLT  87*   --   151  167   < >  115*   --   102*   INR   --    --   1.52*   --    --   1.34*   --   1.31*    < > = values in this interval not displayed.      Recent Labs   Lab Test  04/27/18   0610  04/26/18 1940 04/21/18 0319   NA  142  140  139   POTASSIUM  4.2  4.1  3.5   CHLORIDE  111*  106  101   CO2  24  22  24   BUN  48*  41*  18   CR  1.09  0.91  0.98   ANIONGAP  7  12  14   KEV  7.0*  7.7*  8.2*      Recent Labs   Lab Test  04/26/18   1940 04/21/18   0319  03/24/18   0830   03/21/18   1555   09/25/14   0510   06/12/12   0550   ALBUMIN  2.7*  3.6  3.0*   < >  2.9*   < >   --    < >   --    BILITOTAL  1.1  0.8  0.8   < >  0.9   < >   --    < >   --    ALT  48  79*  65   < >  74*   < >   --    < >   --    AST  68* "  149*  81*   < >  136*   < >   --    < >   --    ALKPHOS  166*  278*  153*   < >  172*   < >   --    < >   --    PROTEIN   --    --    --    --    --    --   Negative   --   Negative   LIPASE   --    --    --    --   316   --    --    --    --     < > = values in this interval not displayed.      I reviewed the patient's new imaging results.     Assessment:  63 year old male with EtOH dependence, chronic liver disease secondary to EtOH complicated by esophageal varices presenting with hematemesis. Pt continues to actively drink. Differential includes a williams prabhakar tear, peptic ulcer disease, AVM or variceal bleed. Hgb responded appropriately to blood transfusion overnight. This morning he appears tremulous but stable.     Plan:   -NPO  -Continue Protonix, Octreotide and Ceftriaxone  -Plan for EGD today in ICU  -Follow hgb and stool output  -Appreciate psychiatry input regarding EtOH dependence  -Further recommendations to follow    I discussed the patient's findings and plan with Dr. Josep Lim who will also independently see and examine the patient.     Jude Artis PA-C  [facility]  Cell:  [phone] Monday through Friday 2150-3427  Office: [phone]

## 2018-04-27 NOTE — ED NOTES
Bed: ST03  Expected date:   Expected time:   Means of arrival:   Comments:  441-63M GI Bleed, ETOH Withdrawls

## 2018-04-27 NOTE — PROGRESS NOTES
GI Attending  EGD revealed prominent distal esophageal varices.  Stigmata of recent bleeding; red whale signs present.  Stomach full of fresh blood.  Six bands were placed; hemostasis was complete after procedure    Recommendations  1) Octreotide gtt 50 mcg/hour next 72 hours  2) PPI 40 mg IV q12  3) Broad spectrum antibiotics  4) Watch for ethanol withdrawal  5) If patient continues to demonstrate clinically significant bleeding despite above therapy: TIPS    My colleague Dr. Eller will be rounding for our group this weekend    Efrain Lim MD  [facility]  [phone] Cell  [phone]  Office

## 2018-04-27 NOTE — PLAN OF CARE
Problem: Patient Care Overview  Goal: Plan of Care/Patient Progress Review  Outcome: Improving        Intensive Care Unit  A/Ox4. CMS intact, BUE tremors. LS coarse. BSx4, difficulty voiding at baseline. Pain controlled with dilaudid and ice for headache. Bedrest this shift. NPO. VSS on 2L NC, Tele is SR. 3rd unit PRBC infusing now.     Patient expressed his desire to stop drinking and would like information on resources to assist him in this endeavor.

## 2018-04-27 NOTE — OR NURSING
EGD in ICU.  ICU nurse monitored vitals and administered sedation.  Placed 6 bands on esophageal varices

## 2018-04-28 ENCOUNTER — APPOINTMENT (OUTPATIENT)
Dept: MRI IMAGING | Facility: CLINIC | Age: 63
DRG: 432 | End: 2018-04-28
Attending: PSYCHIATRY & NEUROLOGY
Payer: MEDICARE

## 2018-04-28 LAB
ALBUMIN SERPL-MCNC: 2.6 G/DL (ref 3.4–5)
ALP SERPL-CCNC: 131 U/L (ref 40–150)
ALT SERPL W P-5'-P-CCNC: 36 U/L (ref 0–70)
ANION GAP SERPL CALCULATED.3IONS-SCNC: 7 MMOL/L (ref 3–14)
AST SERPL W P-5'-P-CCNC: 58 U/L (ref 0–45)
BILIRUB DIRECT SERPL-MCNC: 0.3 MG/DL (ref 0–0.2)
BILIRUB SERPL-MCNC: 0.7 MG/DL (ref 0.2–1.3)
BUN SERPL-MCNC: 34 MG/DL (ref 7–30)
CA-I SERPL ISE-MCNC: 4.3 MG/DL (ref 4.4–5.2)
CALCIUM SERPL-MCNC: 7.2 MG/DL (ref 8.5–10.1)
CHLORIDE SERPL-SCNC: 111 MMOL/L (ref 94–109)
CO2 SERPL-SCNC: 24 MMOL/L (ref 20–32)
CREAT SERPL-MCNC: 1.08 MG/DL (ref 0.66–1.25)
ERYTHROCYTE [DISTWIDTH] IN BLOOD BY AUTOMATED COUNT: 18.5 % (ref 10–15)
GFR SERPL CREATININE-BSD FRML MDRD: 69 ML/MIN/1.7M2
GLUCOSE BLDC GLUCOMTR-MCNC: 106 MG/DL (ref 70–99)
GLUCOSE BLDC GLUCOMTR-MCNC: 121 MG/DL (ref 70–99)
GLUCOSE SERPL-MCNC: 107 MG/DL (ref 70–99)
HCT VFR BLD AUTO: 21.8 % (ref 40–53)
HCT VFR BLD AUTO: 21.9 % (ref 40–53)
HCT VFR BLD AUTO: 23.5 % (ref 40–53)
HGB BLD-MCNC: 7.1 G/DL (ref 13.3–17.7)
HGB BLD-MCNC: 7.2 G/DL (ref 13.3–17.7)
HGB BLD-MCNC: 7.6 G/DL (ref 13.3–17.7)
MCH RBC QN AUTO: 27.6 PG (ref 26.5–33)
MCHC RBC AUTO-ENTMCNC: 33 G/DL (ref 31.5–36.5)
MCV RBC AUTO: 84 FL (ref 78–100)
PLATELET # BLD AUTO: 99 10E9/L (ref 150–450)
POTASSIUM SERPL-SCNC: 3.7 MMOL/L (ref 3.4–5.3)
PROT SERPL-MCNC: 5.2 G/DL (ref 6.8–8.8)
RBC # BLD AUTO: 2.61 10E12/L (ref 4.4–5.9)
SODIUM SERPL-SCNC: 142 MMOL/L (ref 133–144)
WBC # BLD AUTO: 5.3 10E9/L (ref 4–11)

## 2018-04-28 PROCEDURE — 25000132 ZZH RX MED GY IP 250 OP 250 PS 637: Mod: GY | Performed by: PSYCHIATRY & NEUROLOGY

## 2018-04-28 PROCEDURE — 25000128 H RX IP 250 OP 636: Performed by: INTERNAL MEDICINE

## 2018-04-28 PROCEDURE — 36415 COLL VENOUS BLD VENIPUNCTURE: CPT | Performed by: INTERNAL MEDICINE

## 2018-04-28 PROCEDURE — A9585 GADOBUTROL INJECTION: HCPCS | Performed by: INTERNAL MEDICINE

## 2018-04-28 PROCEDURE — 85027 COMPLETE CBC AUTOMATED: CPT | Performed by: INTERNAL MEDICINE

## 2018-04-28 PROCEDURE — A9270 NON-COVERED ITEM OR SERVICE: HCPCS | Mod: GY | Performed by: PSYCHIATRY & NEUROLOGY

## 2018-04-28 PROCEDURE — 85014 HEMATOCRIT: CPT | Performed by: INTERNAL MEDICINE

## 2018-04-28 PROCEDURE — 80048 BASIC METABOLIC PNL TOTAL CA: CPT | Performed by: INTERNAL MEDICINE

## 2018-04-28 PROCEDURE — 99223 1ST HOSP IP/OBS HIGH 75: CPT | Performed by: PSYCHIATRY & NEUROLOGY

## 2018-04-28 PROCEDURE — 99233 SBSQ HOSP IP/OBS HIGH 50: CPT | Performed by: INTERNAL MEDICINE

## 2018-04-28 PROCEDURE — 00000146 ZZHCL STATISTIC GLUCOSE BY METER IP

## 2018-04-28 PROCEDURE — 25000132 ZZH RX MED GY IP 250 OP 250 PS 637: Mod: GY | Performed by: INTERNAL MEDICINE

## 2018-04-28 PROCEDURE — 70553 MRI BRAIN STEM W/O & W/DYE: CPT

## 2018-04-28 PROCEDURE — 85018 HEMOGLOBIN: CPT | Performed by: INTERNAL MEDICINE

## 2018-04-28 PROCEDURE — 20000003 ZZH R&B ICU

## 2018-04-28 PROCEDURE — 70549 MR ANGIOGRAPH NECK W/O&W/DYE: CPT

## 2018-04-28 PROCEDURE — 25000125 ZZHC RX 250: Performed by: INTERNAL MEDICINE

## 2018-04-28 PROCEDURE — A9270 NON-COVERED ITEM OR SERVICE: HCPCS | Mod: GY | Performed by: INTERNAL MEDICINE

## 2018-04-28 PROCEDURE — 82330 ASSAY OF CALCIUM: CPT | Performed by: INTERNAL MEDICINE

## 2018-04-28 PROCEDURE — 72156 MRI NECK SPINE W/O & W/DYE: CPT

## 2018-04-28 PROCEDURE — 25000128 H RX IP 250 OP 636: Performed by: PSYCHIATRY & NEUROLOGY

## 2018-04-28 PROCEDURE — 80076 HEPATIC FUNCTION PANEL: CPT | Performed by: INTERNAL MEDICINE

## 2018-04-28 RX ORDER — GADOBUTROL 604.72 MG/ML
15 INJECTION INTRAVENOUS ONCE
Status: COMPLETED | OUTPATIENT
Start: 2018-04-28 | End: 2018-04-28

## 2018-04-28 RX ORDER — DIAZEPAM 10 MG/2ML
5 INJECTION, SOLUTION INTRAMUSCULAR; INTRAVENOUS ONCE
Status: COMPLETED | OUTPATIENT
Start: 2018-04-28 | End: 2018-04-28

## 2018-04-28 RX ADMIN — HYDROMORPHONE HYDROCHLORIDE 0.5 MG: 1 INJECTION, SOLUTION INTRAMUSCULAR; INTRAVENOUS; SUBCUTANEOUS at 02:25

## 2018-04-28 RX ADMIN — HYDROMORPHONE HYDROCHLORIDE 0.5 MG: 1 INJECTION, SOLUTION INTRAMUSCULAR; INTRAVENOUS; SUBCUTANEOUS at 14:17

## 2018-04-28 RX ADMIN — Medication 5 MG: at 22:01

## 2018-04-28 RX ADMIN — TAMSULOSIN HYDROCHLORIDE 0.4 MG: 0.4 CAPSULE ORAL at 08:51

## 2018-04-28 RX ADMIN — ACETAMINOPHEN 650 MG: 325 TABLET ORAL at 14:45

## 2018-04-28 RX ADMIN — GADOBUTROL 15 ML: 604.72 INJECTION INTRAVENOUS at 23:28

## 2018-04-28 RX ADMIN — HYDROMORPHONE HYDROCHLORIDE 0.5 MG: 1 INJECTION, SOLUTION INTRAMUSCULAR; INTRAVENOUS; SUBCUTANEOUS at 20:35

## 2018-04-28 RX ADMIN — DIAZEPAM 10 MG: 5 TABLET ORAL at 20:14

## 2018-04-28 RX ADMIN — SODIUM CHLORIDE 8 MG/HR: 9 INJECTION, SOLUTION INTRAVENOUS at 03:42

## 2018-04-28 RX ADMIN — DIAZEPAM 10 MG: 5 TABLET ORAL at 08:51

## 2018-04-28 RX ADMIN — MIRTAZAPINE 30 MG: 30 TABLET, ORALLY DISINTEGRATING ORAL at 20:35

## 2018-04-28 RX ADMIN — HYDROMORPHONE HYDROCHLORIDE 0.5 MG: 1 INJECTION, SOLUTION INTRAMUSCULAR; INTRAVENOUS; SUBCUTANEOUS at 18:33

## 2018-04-28 RX ADMIN — PANTOPRAZOLE SODIUM 40 MG: 40 INJECTION, POWDER, FOR SOLUTION INTRAVENOUS at 08:52

## 2018-04-28 RX ADMIN — HYDROMORPHONE HYDROCHLORIDE 0.5 MG: 1 INJECTION, SOLUTION INTRAMUSCULAR; INTRAVENOUS; SUBCUTANEOUS at 08:18

## 2018-04-28 RX ADMIN — Medication 5 MG: at 22:34

## 2018-04-28 RX ADMIN — HYDROMORPHONE HYDROCHLORIDE 0.5 MG: 1 INJECTION, SOLUTION INTRAMUSCULAR; INTRAVENOUS; SUBCUTANEOUS at 22:24

## 2018-04-28 RX ADMIN — ACETAMINOPHEN 650 MG: 325 TABLET ORAL at 18:34

## 2018-04-28 RX ADMIN — CALCIUM GLUCONATE 1 G: 98 INJECTION, SOLUTION INTRAVENOUS at 14:30

## 2018-04-28 RX ADMIN — CEFTRIAXONE 1 G: 1 INJECTION, POWDER, FOR SOLUTION INTRAMUSCULAR; INTRAVENOUS at 18:52

## 2018-04-28 RX ADMIN — PANTOPRAZOLE SODIUM 40 MG: 40 INJECTION, POWDER, FOR SOLUTION INTRAVENOUS at 20:14

## 2018-04-28 RX ADMIN — HYDROMORPHONE HYDROCHLORIDE 0.5 MG: 1 INJECTION, SOLUTION INTRAMUSCULAR; INTRAVENOUS; SUBCUTANEOUS at 11:38

## 2018-04-28 RX ADMIN — DIAZEPAM 10 MG: 5 TABLET ORAL at 02:30

## 2018-04-28 ASSESSMENT — PAIN DESCRIPTION - DESCRIPTORS
DESCRIPTORS: ACHING;HEADACHE;SHOOTING
DESCRIPTORS: ACHING;DISCOMFORT
DESCRIPTORS: ACHING;DISCOMFORT;HEADACHE
DESCRIPTORS: DISCOMFORT
DESCRIPTORS: CONSTANT;ACHING
DESCRIPTORS: ACHING;CONSTANT
DESCRIPTORS: ACHING;HEADACHE;SHARP

## 2018-04-28 NOTE — PROVIDER NOTIFICATION
Dr. York text paged about pt's ionized Ca+ 4.3, was replaced yesterday at 4.0, RN also spoke w/ pt's brother who mentioned pt has been falling a lot lately and hitting head.  Order for 1g Ca+ gluc.

## 2018-04-28 NOTE — PROGRESS NOTES
"GASTROENTEROLOGY PROGRESS NOTE     IMPRESSION:  Acute blood loss anemia/variceal bleed  Decompensated etoh cirrhosis, actively drinking, not a tx candidate    RECOMMENDATIONS:  Okay to ADAT  PPI/Abx/Octreotide - okay to stop octreotide tomorrow afternoon  Etoh withdrawal protocol  Follow LFTs, CBC, creat    Wily Eller DO   [facility]  Cell [phone]  ________________________________________________________________________      SUBJECTIVE:  No stool overnight/this AM per nursing. Hgb relatively stable.  Easily awakened, resting comfortably, complains only of headache.     OBJECTIVE:  /63  Pulse 84  Temp 98.4  F (36.9  C)  Resp 13  Ht 1.702 m (5' 7\")  Wt 92.6 kg (204 lb 2.3 oz)  SpO2 100%  BMI 31.97 kg/m2  Temp (24hrs), Av.3  F (36.8  C), Min:97.3  F (36.3  C), Max:98.6  F (37  C)    Patient Vitals for the past 72 hrs:   Weight   18 2238 92.6 kg (204 lb 2.3 oz)   18 2002 93 kg (205 lb)       Intake/Output Summary (Last 24 hours) at 18 1014  Last data filed at 18 0852   Gross per 24 hour   Intake          3006.67 ml   Output             2460 ml   Net           546.67 ml        PHYSICAL EXAM  GEN: Alert, oriented x3, communicative and in NAD.    LYMPH: No LAD noted.  HRT: RRR  LUNGS: CTA  ABD: ND, +BS, no guarding or pain to palpation, no rebound, no HSM.  SKIN: No rash, jaundice or spider angiomata      Additional Data:  I have reviewed the patient's new clinical lab results:     Recent Labs   Lab Test  18   0410  18   2158  18   1850   18   0610   18   1940   18   0430   03/21/18   1555   WBC  5.3   --    --    --   5.3   --   6.6   < >  4.7   --   6.0   HGB  7.2*  7.3*  7.7*   < >  7.5*   < >  5.8*   < >  8.3*   < >  7.4*   MCV  84   --    --    --   83   --   81   < >  79   --   78   PLT  99*   --    --    --   87*   --   151   < >  115*   --   102*   INR   --    --    --    --    --    --   1.52*   --   1.34*   --   " 1.31*    < > = values in this interval not displayed.     Recent Labs   Lab Test  04/28/18 0410 04/27/18 0610 04/26/18 1940   POTASSIUM  3.7  4.2  4.1   CHLORIDE  111*  111*  106   CO2  24  24  22   BUN  34*  48*  41*   ANIONGAP  7  7  12     Recent Labs   Lab Test  04/28/18 0410 04/26/18 1940 04/21/18 0319 03/21/18   1555   09/25/14   0510   06/12/12   0550   ALBUMIN  2.6*  2.7*  3.6   < >  2.9*   < >   --    < >   --    BILITOTAL  0.7  1.1  0.8   < >  0.9   < >   --    < >   --    ALT  36  48  79*   < >  74*   < >   --    < >   --    AST  58*  68*  149*   < >  136*   < >   --    < >   --    PROTEIN   --    --    --    --    --    --   Negative   --   Negative   LIPASE   --    --    --    --   316   --    --    --    --     < > = values in this interval not displayed.

## 2018-04-28 NOTE — PLAN OF CARE
Problem: Anemia (Adult)  Goal: Symptom Improvement  Patient will demonstrate the desired outcomes by discharge/transition of care.   Outcome: No Change  hgb remains low-mid 7 range. No hemataemesis or stools through the night, vss

## 2018-04-28 NOTE — PROGRESS NOTES
[facility]    Hospitalist Progress Note    Assessment & Plan   Jim Richard is a 63 year old male who was admitted on 4/26/2018.     63-year-old male with history of alcoholic dependence and alcoholic liver disease, esophageal varices, COPD, ongoing tobacco and alcohol use, presenting with:     1. Hematemesis  secondary to variceal bleeding in the setting of ibuprofen use and alcohol use contributing. Got 2 units of blood in the emergency room. EGD revealed prominent distal esophageal varices.  Stigmata of recent bleeding; red whale signs present.  Stomach full of fresh blood.  Six bands were placed; hemostasis was complete after procedure. NO As per GI  Plan:   - Octreotide gtt 50 mcg/hour next 72 hours  - PPI 40 mg IV q12  - cetriaxone  - Watch for ethanol withdrawal and rebleeding (serial Hb and hemodynamic monitoring. No transfusion since early 4/7 and Hb staying above 7  - If patient continues to demonstrate clinically significant bleeding despite above therapy: TIPS  - Check INR in am  - started on clear liquid    2. Anemia and thrombocypenia: secondary to bleeding and alcohol abuse, respectively  - monitor H and H  - recheck platelets tomorrow    3.  Alcoholic liver disease.  4/23/18 Us fatty infiltration of liver. Given varices portal hypertension present  - The patient was advised to abstain from alcohol  - Psych and Chem Dep consultations.     4.  Alcohol dependence and withdrawal  -  CIWA protocol with p.r.n. Ativan dosing  -   multivitamin, thiamine and folate    5.  History of chronic obstructive pulmonary disease, currently stable.   -  Continue Breo-Ellipta    6.  Hyperlipidemia.  - hold the atorvastatin while he is n.p.o.     7.  Depression  - hold the Cymbalta, mirtazapine and Seroquel  - psych consultatation done; recommendation Remeron Sol-tab 30mg qhs      8.  Headaches.  for 4 weeks behind left ear. Constant. CT negative 4-21,. Ear examined yesterday unremarkable per  patient.   - has been treated with low-dose oxycodone and IV Dilaudid p.r.n.   - cause? Will get neuro input    9.  CODE STATUS: . DISCUSSED WITH THE PATIENT AND HE WANTS TO BE DNR/DNI.         10.  Deep vein thrombosis prophylaxis with PCDs in the setting of GI bleed.      11.  GI prophylaxis.  He is going to be on a Protonix iv bid    # Pain Assessment:  Current Pain Score 4/28/2018   Patient currently in pain? yes   Pain score (0-10) 9   Pain location Head   Pain descriptors Aching;Discomfort;Headache   low dose oxycodone and dilaudid prn    DVT Prophylaxis: SCD  Code Status: DNR/DNI    Disposition: Expected discharge 3-4 days depending on bleeding and etoh withdrawal    Text Page (7am - 6pm)  Peter York MD    Interval History   No hematemesis, hemodynamic instability, oriented 3x, no w/d. Main compain is headache     -Data reviewed today: I reviewed all new labs and imaging results over the last 24 hours. I personally reviewed none    Physical Exam   Temp: 98.6  F (37  C) Temp src: Bladder BP: 138/66   Heart Rate: 79 Resp: 15 SpO2: 93 % O2 Device: Nasal cannula Oxygen Delivery: 2 LPM  Vitals:    04/26/18 2002 04/26/18 2238   Weight: 93 kg (205 lb) 92.6 kg (204 lb 2.3 oz)     Vital Signs with Ranges  Temp:  [97.3  F (36.3  C)-98.6  F (37  C)] 98.6  F (37  C)  Heart Rate:  [] 79  Resp:  [10-29] 15  BP: ()/() 138/66  SpO2:  [93 %-99 %] 93 %  I/O last 3 completed shifts:  In: 3041.51 [I.V.:3041.51]  Out: 2210 [Urine:2210]    Constitutional: alert , oriented 3x  Respiratory: clear to auscultation, no wheezing or rhonchi   Cardiovascular: regular rate and rhythm without murmer or rub   GI:positive bowel sounds, non-tender   Skin/Integumen: no rashes    Other:        Medications     - MEDICATION INSTRUCTIONS -       octreotide (sandoSTATIN) infusion ADULT 50 mcg/hr (04/27/18 2311)     pantoprazole (PROTONIX) infusion ADULT/PEDS GREATER than or EQUAL to 45 kg 8 mg/hr (04/28/18 4932)      sodium chloride Stopped (04/27/18 2158)       cefTRIAXone  1 g Intravenous Q24H     fluticasone-vilanterol  1 puff Inhalation Daily     mirtazapine  30 mg Orally disintegrating tablet At Bedtime     sodium chloride (PF)  3 mL Intracatheter Q8H     IV Fluid with vitamins   Intravenous Q24H     tamsulosin (FLOMAX) capsule 0.4 mg  0.4 mg Oral Daily       Data     Recent Labs  Lab 04/28/18  0410 04/27/18 2158 04/27/18  1850  04/27/18  0610  04/26/18  1940   WBC 5.3  --   --   --  5.3  --  6.6   HGB 7.2* 7.3* 7.7*  < > 7.5*  < > 5.8*   MCV 84  --   --   --  83  --  81   PLT 99*  --   --   --  87*  --  151   INR  --   --   --   --   --   --  1.52*     --   --   --  142  --  140   POTASSIUM 3.7  --   --   --  4.2  --  4.1   CHLORIDE 111*  --   --   --  111*  --  106   CO2 24  --   --   --  24  --  22   BUN 34*  --   --   --  48*  --  41*   CR 1.08  --   --   --  1.09  --  0.91   ANIONGAP 7  --   --   --  7  --  12   KEV 7.2*  --   --   --  7.0*  --  7.7*   *  --   --   --  109*  --  172*   ALBUMIN 2.6*  --   --   --   --   --  2.7*   PROTTOTAL 5.2*  --   --   --   --   --  5.5*   BILITOTAL 0.7  --   --   --   --   --  1.1   ALKPHOS 131  --   --   --   --   --  166*   ALT 36  --   --   --   --   --  48   AST 58*  --   --   --   --   --  68*   TROPI  --   --   --   --   --   --  <0.015   < > = values in this interval not displayed.    No results found for this or any previous visit (from the past 24 hour(s)).

## 2018-04-28 NOTE — CONSULTS
"[facility] - NEUROLOGY CONSULT NOTE  Name: Jim Richard  DOS: 04/28/2018  Reason for Consult:  I was asked by Dr. York to evaluate this patient for headache    HPI  Jim Richard is a 63-year-old male with past medical history of chronic alcohol dependence, COPD, PAD, hypertension, hyperlipidemia, depression, anxiety, chronic lower back pain with lumbar stenosis, who presented on 4/27/18 with acute hematemesis.  Patient has been having daily headaches for approximately the last month, and neurology was asked to evaluate the cause of these.    Patient denies having a history of headaches chronically.  He states that his headaches started about 3 or 4 weeks ago.  While the patient is an admittedly poor historian, chart review does not reveal any mention of headache prior to his hospitalization on 4/21/18.  The pain is constant, from morning to night, located over the left occiput.  The pain often shoots from around the spine towards the left occiput.  He feels the pain more on the surface, rather than deeper. He describes the pain is typically being \"dull\", however when it gets bad it can be \"sharp\".  He reports drinking alcohol temporarily relieves the pain, and he has been drinking more heavily in the last month because of this (reports drinking 0.75 L of vodka daily, whereas before he was drinking less than a pint per day).  Reports his pain is worse when he sits upright.  Reports some nausea, and occasional emesis - seems to be worse when sitting up. Denies significant photophobia. He also experiences dizziness while sitting upright -denies seeing the room spinning, simply feels lightheaded.     ROS  He reports that headache probably started after a fall which he experienced around the end of March, where he hit his head. Patient's family has reported his been experiencing frequent falls over the last month.   Patient reports chronic memory loss related to chronic alcohol use.  Reports " chronic lower back pain, with history of lumbar stenosis.  He had a lumbar discectomy with fusion completed in the past for this.  Reports some blurred vision, but reports he recently had cataract surgery at the [facility] for this  10-point review of systems completed and negative, except as noted above.    Past Medical/Surgical History  Past Medical History:   Diagnosis Date     Alcoholism (H) 1/14/2013    had treatment at [facility]     Anxiety      Coronary artery disease      Depression     w/anxiety     Heart attack      Hepatomegaly      Hyperlipemia      Hypertension      Peripheral vascular disease (H)      PVD (peripheral vascular disease) (H)      Sleep apnea      Spinal stenosis      Past Surgical History:   Procedure Laterality Date     APPENDECTOMY       BYPASS GRAFT FEMOROPOPLITEAL  6/12/2012    Procedure: BYPASS GRAFT FEMOROPOPLITEAL;  LEFT FEMORAL TO ABOVE KNEE POPLITEAL BYPASS, ENDARTERECTOMY OF SFA AND PF ARTERIES, LEFT EXTERNAL ILIAC TO COMMON FEMORAL INTERPOSITION GRAFT;  Surgeon: Damir Roberts MD;  Location:  OR     COLONOSCOPY       ENDARTERECTOMY FEMORAL  6/12/2012    Procedure: ENDARTERECTOMY FEMORAL;;  Surgeon: Damir Roberts MD;  Location:  OR     ESOPHAGOSCOPY, GASTROSCOPY, DUODENOSCOPY (EGD), COMBINED N/A 3/22/2018    Procedure: COMBINED ESOPHAGOSCOPY, GASTROSCOPY, DUODENOSCOPY (EGD);  ESOPHAGOSCOPY, GASTROSCOPY, DUODENOSOCPY.;  Surgeon: Valeri Pruitt MD;  Location:  OR     HERNIA REPAIR  2017    Abdominal     LAMINECTOMY, FUSION LUMBAR THREE+ LEVEL, COMBINED N/A 9/22/2014    Procedure: COMBINED LAMINECTOMY, FUSION LUMBAR THREE+ LEVEL;  Surgeon: Sebastien Pruitt MD;  Location:  OR     TONSILLECTOMY & ADENOIDECTOMY       Current Facility-Administered Medications   Medication     acetaminophen (TYLENOL) tablet 650 mg     albuterol (PROAIR HFA/PROVENTIL HFA/VENTOLIN HFA) Inhaler 2 puff     cefTRIAXone (ROCEPHIN) 1 g vial to attach to  mL bag for ADULTS or  NS 50 mL bag for PEDS     diazepam (VALIUM) tablet 10 mg    Or     diazepam (VALIUM) injection 5-10 mg     flumazenil (ROMAZICON) injection 0.2 mg     fluticasone-vilanterol (BREO ELLIPTA) 200-25 MCG/INH oral inhaler 1 puff     haloperidol lactate (HALDOL) injection 2 mg     HYDROmorphone (PF) (DILAUDID) injection 0.3-0.5 mg     lidocaine (LMX4) cream     lidocaine 1 % 1 mL     May continue current IV fluids if patient has IV fluids infusing.     melatonin tablet 1 mg     mirtazapine (REMERON SOL-TAB) ODT tab 30 mg     naloxone (NARCAN) injection 0.1-0.4 mg     octreotide (sandoSTATIN) 1,250 mcg in sodium chloride 0.9 % 250 mL     ondansetron (ZOFRAN-ODT) ODT tab 4 mg    Or     ondansetron (ZOFRAN) injection 4 mg     oxyCODONE IR (ROXICODONE) half-tab 2.5-5 mg     pantoprazole (PROTONIX) 40 mg IV push injection     polyethylene glycol (MIRALAX/GLYCOLAX) Packet 17 g     potassium chloride (KLOR-CON) Packet 20-40 mEq     potassium chloride 10 mEq in 100 mL intermittent infusion with 10 mg lidocaine     potassium chloride 10 mEq in 100 mL sterile water intermittent infusion (premix)     potassium chloride 20 mEq in 50 mL intermittent infusion     potassium chloride SA (K-DUR/KLOR-CON M) CR tablet 20-40 mEq     prochlorperazine (COMPAZINE) injection 10 mg    Or     prochlorperazine (COMPAZINE) tablet 10 mg    Or     prochlorperazine (COMPAZINE) Suppository 25 mg     QUEtiapine (SEROquel) tablet 25-50 mg     sodium chloride (PF) 0.9% PF flush 3 mL     sodium chloride (PF) 0.9% PF flush 3 mL     sodium chloride 0.9 % 1,000 mL with INFUVITE 10 mL, thiamine 100 mg, folic acid 1 mg infusion     tamsulosin (FLOMAX) capsule 0.4 mg     Facility-Administered Medications Ordered in Other Encounters   Medication     Self Administer Medications: Behavioral Services     No Known Allergies    Family History  Family History   Problem Relation Age of Onset     MENTAL ILLNESS Son      Coronary Artery Disease Early Onset Mother       "Substance Abuse Father      CANCER Father      Substance Abuse Brother      Unknown/Adopted No family hx of      Depression No family hx of      Anxiety Disorder No family hx of      Schizophrenia No family hx of      Bipolar Disorder No family hx of      Suicide No family hx of      Dementia No family hx of      [place] Disease No family hx of      Parkinsonism No family hx of      Autism Spectrum Disorder No family hx of      Intellectual Disability (Mental Retardation) No family hx of    No family history of stroke.    Social History  Social History   Substance Use Topics     Smoking status: Current Every Day Smoker     Packs/day: 1.00     Types: Cigarettes     Smokeless tobacco: Never Used     Alcohol use Yes      Comment: pt has been drinking 1/2-1 pint of vodka daily since Christmas       OBJECTIVE  /60  Pulse 84  Temp 98.4  F (36.9  C)  Resp 9  Ht 1.702 m (5' 7\")  Wt 92.6 kg (204 lb 2.3 oz)  SpO2 98%  BMI 31.97 kg/m2  GEN Cooperative. No acute distress.  HEENT No icterus. Mucous membranes moist.  CVS Peripheral Pulse palpable. Regular rate and rhythm. No murmurs.  PULM No respiratory distress. Lungs clear.  MSK Reports pain over the left neck around C3, and at the left occiput. Mild reproducible tenderness with deep palpation - reports most feeling in that area as \"numb\".   Feet dusky, particularly the right foot. No knee/ankle swelling.  PSYC Affect wnl  NEURO   MS Appropriate in alertness, attention, orientation. No aphasia. Recalls 0/3 objects at 2 minutes. Recalls the current president is \"Trump\", but states the president before Trump was either Hand or Guzman. Recalls somewhat vaguely the events of 9/11, and regains more details with prompting.   EYE Pupils small and minimally reactive. Patient somewhat intolerant of exam - discs difficult to visualize. Vessels appear fairly normal.   CN Visual fields intact. Pupils small and minimally reactive. EOMI. Facial movements symmetric. Hearing " intact to conversation. Palate elevation symmetric, uvula midline. Tongue protrusion midline.  Trace conversational dysarthria.   MTR Mild resting limb tremor, worse with posture and intention. Normal tone. No drift.   Strength somewhat effort dependent throughout the arms and legs.   SNS Symmetric to light touch in the face, arms, and upper legs. Decreased to light touch in the feet, worse in the R foor than L.   RFLX 2+ and symmetric in biceps, brach, knees. 0+ both ankles. Plantars equivocal.   CRD Finger-nose-finger quite shaky, and somewhat clumsy   GAIT Deferred d/t falls risk.    Recent labs and imaging reviewed and used in formulating the plan.      IMPRESSION  # Continuous daily headache  Patient with new onset left occipital head/neck pain, which started following recent falls with head trauma. Concerning for CSF leak, vs vertebral dissection, vs cervical radiculopathy.   --Positional component particularly concerning for CSF leak.   --Vertebral dissection possible, particularly given the location of lesion. Ataxia could represent cerebellar infarct, though could also be easily explained by chronic EtOH abuse.  --Focal nature of pain is concerning for cervical radiculopathy. No signs of myelopathy on exam.  --Headache may also be part of EtOH withdrawal, though focal nature of headache is more concerning for the above.  --Absence of fever, leukocytosis, or acute encephalopathy makes meningitis unlikely.    Will start our workup with MR brain and C-spine, with contrast, to look for evidence of CSF leak, as well as cervical radiculopathy. Will also get MRA neck to evaluate for vertebral dissection. If these are unrevealing, may consider LP with opening pressure, myelography, and/or cisternography.    Patient was on duloxetine 60mg daily prior to admission. As this may be helpful both for mood, and for radiculopathy, may be reasonable to restart.    # Possible Korsakoff  Patient had prominent memory loss on  my bedside exam. Concerning for Korsakoff's given long history of heavy EtOH abuse. Already receiving thiamine replacement.    Recs  1. MR brain and C-spine, with and without contrast  2. MRA neck - with fat saturated images, eval for vertebral dissection  3. Consider restarting home duloxetine 60mg daily  4. Management of EtOH withdrawal per primary team  5. Continue thiamine replacement x3 days, as you are    I spent more than 70 minutes with this patient today, more than half of which was time spent in patient/family counseling and in care coordination.    Dez Pierce MD. Neurology. Text Page  4/28/2018 3:59 PM

## 2018-04-28 NOTE — PLAN OF CARE
Problem: Alcohol Withdrawal Acute, Risk/Actual (Adult)  Goal: Signs and Symptoms of Listed Potential Problems Will be Absent, Minimized or Managed (Alcohol Withdrawal Acute, Risk/Actual)  Signs and symptoms of listed potential problems will be absent, minimized or managed by discharge/transition of care (reference Alcohol Withdrawal Acute, Risk/Actual (Adult) CPG).   Outcome: No Change  Please refer to CIWA flowsheet for details. Pt. Has received oral Valium x2 on nights, Haldol x1. Pt. remains oriented, pleasant & cooperative, tremors of UE persist throughout the night biggest complaint is of constant left sided headache, however does get some relief from Dilaudid. vss

## 2018-04-28 NOTE — PROVIDER NOTIFICATION
Dr. Srivastava paged for jiang placement for pt.  Ok to place jiang and uro-jelly per telephone w/ readback.

## 2018-04-28 NOTE — PROGRESS NOTES
ICU Multi-Disciplinary Note  Patient condition reviewed and discussed while on multidisciplinary rounds today.  Admitted with GI bleed. Hx of ETOH abuse/dependence, alcoholic liver disease, and varices.  He reportedly had been drinking somewhere between 0.75 to 1.75 Liters of vodka daily as of late.  He began to have high output bloody emesis and was brought in by EMS.  His hgb was 5.8 on arrival.  He was transfused with PRBC and GI was consulted.  GI scoped 4/27 and found varices and placed 6 bands.  No further hematemesis today.  Had one black stool overnight.  Hgb continues to drift.  No signs of significant withdrawal.  Received two doses of valium and haldol x 1 on night shift.    He remains hemodynamically stable.    Due to significant ETOH abuse/dependence it is felt that patient will likely undergo ETOH withdrawal and thus, remains in ICU.    Recommend high dose gabapentin as well as low dose scheduled benzodiazepines.  Patient will stay in ICU for close monitoring.  He will remain on CIWA scoring and meds.      The Critical Care service will continue to follow peripherally while patient is within the ICU. We are readily available should issues arise.  Please feel free to contact us for critical care issues with which we may be of assistance. For all other concerns, please contact primary service first.      Radha Patel, RUTHIE

## 2018-04-28 NOTE — PROGRESS NOTES
"Attempted to lay on right side for a while but patient states that since he fell last month & hit his head he can only sleep on the left side. Now pain is 10 of 10. Pt. States \"they tell me it's muscle pain, states he has tried muscle relaxants @ home without relief from headache. neuros are intact except for persistent baseline photophobia. Dilaudud given, ice pack replenished  "

## 2018-04-28 NOTE — PROGRESS NOTES
Pt. Is sleeping now. Continue close monitoring, meducate prn for pain & withdrwal symptoms per CIWA

## 2018-04-28 NOTE — PLAN OF CARE
Problem: Patient Care Overview  Goal: Plan of Care/Patient Progress Review  Outcome: Improving  Pt's hgb improved throughout day, currently 7.7, q4hr checks.  Rt forearm IV infiltrated, removed and ice pack placed per protocol verified w/ pharmacy.  1g Ca+ gluc given, recheck in AM.  Leger order for retention.  Abdomen remains distended/rounded.  5mg valium given x5 today per CIWA protocol, pt had several dry-heave episodes but no emesis, 1 large black stool this morning.  Octreotide, Protonix, NS infusing.  A&Ox4.  Continue to monitor.

## 2018-04-29 LAB
ANION GAP SERPL CALCULATED.3IONS-SCNC: 8 MMOL/L (ref 3–14)
BUN SERPL-MCNC: 21 MG/DL (ref 7–30)
CA-I BLD-MCNC: 4.4 MG/DL (ref 4.4–5.2)
CALCIUM SERPL-MCNC: 7.6 MG/DL (ref 8.5–10.1)
CHLORIDE SERPL-SCNC: 106 MMOL/L (ref 94–109)
CO2 SERPL-SCNC: 25 MMOL/L (ref 20–32)
CREAT SERPL-MCNC: 1.09 MG/DL (ref 0.66–1.25)
ERYTHROCYTE [DISTWIDTH] IN BLOOD BY AUTOMATED COUNT: 18.7 % (ref 10–15)
GFR SERPL CREATININE-BSD FRML MDRD: 68 ML/MIN/1.7M2
GLUCOSE SERPL-MCNC: 104 MG/DL (ref 70–99)
HCT VFR BLD AUTO: 22.3 % (ref 40–53)
HCT VFR BLD AUTO: 23.2 % (ref 40–53)
HCT VFR BLD AUTO: 24.7 % (ref 40–53)
HGB BLD-MCNC: 7.1 G/DL (ref 13.3–17.7)
HGB BLD-MCNC: 7.6 G/DL (ref 13.3–17.7)
HGB BLD-MCNC: 7.9 G/DL (ref 13.3–17.7)
INR PPP: 1.21 (ref 0.86–1.14)
MCH RBC QN AUTO: 27.7 PG (ref 26.5–33)
MCHC RBC AUTO-ENTMCNC: 32.8 G/DL (ref 31.5–36.5)
MCV RBC AUTO: 85 FL (ref 78–100)
PLATELET # BLD AUTO: 101 10E9/L (ref 150–450)
POTASSIUM SERPL-SCNC: 3.4 MMOL/L (ref 3.4–5.3)
RBC # BLD AUTO: 2.74 10E12/L (ref 4.4–5.9)
SODIUM SERPL-SCNC: 139 MMOL/L (ref 133–144)
WBC # BLD AUTO: 5 10E9/L (ref 4–11)

## 2018-04-29 PROCEDURE — 12000000 ZZH R&B MED SURG/OB

## 2018-04-29 PROCEDURE — 25000128 H RX IP 250 OP 636: Performed by: INTERNAL MEDICINE

## 2018-04-29 PROCEDURE — 85610 PROTHROMBIN TIME: CPT | Performed by: INTERNAL MEDICINE

## 2018-04-29 PROCEDURE — 85014 HEMATOCRIT: CPT | Performed by: INTERNAL MEDICINE

## 2018-04-29 PROCEDURE — 25000132 ZZH RX MED GY IP 250 OP 250 PS 637: Mod: GY | Performed by: INTERNAL MEDICINE

## 2018-04-29 PROCEDURE — 99233 SBSQ HOSP IP/OBS HIGH 50: CPT | Performed by: PSYCHIATRY & NEUROLOGY

## 2018-04-29 PROCEDURE — 80048 BASIC METABOLIC PNL TOTAL CA: CPT | Performed by: INTERNAL MEDICINE

## 2018-04-29 PROCEDURE — 99233 SBSQ HOSP IP/OBS HIGH 50: CPT | Performed by: INTERNAL MEDICINE

## 2018-04-29 PROCEDURE — A9270 NON-COVERED ITEM OR SERVICE: HCPCS | Mod: GY | Performed by: PSYCHIATRY & NEUROLOGY

## 2018-04-29 PROCEDURE — A9270 NON-COVERED ITEM OR SERVICE: HCPCS | Mod: GY | Performed by: INTERNAL MEDICINE

## 2018-04-29 PROCEDURE — 36415 COLL VENOUS BLD VENIPUNCTURE: CPT | Performed by: INTERNAL MEDICINE

## 2018-04-29 PROCEDURE — 25000125 ZZHC RX 250: Performed by: INTERNAL MEDICINE

## 2018-04-29 PROCEDURE — 25000132 ZZH RX MED GY IP 250 OP 250 PS 637: Mod: GY | Performed by: PSYCHIATRY & NEUROLOGY

## 2018-04-29 PROCEDURE — 85018 HEMOGLOBIN: CPT | Performed by: INTERNAL MEDICINE

## 2018-04-29 PROCEDURE — 85027 COMPLETE CBC AUTOMATED: CPT | Performed by: INTERNAL MEDICINE

## 2018-04-29 PROCEDURE — 82330 ASSAY OF CALCIUM: CPT | Performed by: INTERNAL MEDICINE

## 2018-04-29 RX ORDER — ATORVASTATIN CALCIUM 10 MG/1
10 TABLET, FILM COATED ORAL AT BEDTIME
Status: DISCONTINUED | OUTPATIENT
Start: 2018-04-29 | End: 2018-05-04 | Stop reason: HOSPADM

## 2018-04-29 RX ORDER — PREGABALIN 25 MG/1
25 CAPSULE ORAL 3 TIMES DAILY
Status: DISCONTINUED | OUTPATIENT
Start: 2018-04-29 | End: 2018-05-04 | Stop reason: HOSPADM

## 2018-04-29 RX ORDER — DULOXETIN HYDROCHLORIDE 30 MG/1
60 CAPSULE, DELAYED RELEASE ORAL DAILY
Status: DISCONTINUED | OUTPATIENT
Start: 2018-04-29 | End: 2018-05-04 | Stop reason: HOSPADM

## 2018-04-29 RX ADMIN — Medication 5 MG: at 07:39

## 2018-04-29 RX ADMIN — Medication 5 MG: at 13:02

## 2018-04-29 RX ADMIN — Medication 5 MG: at 17:23

## 2018-04-29 RX ADMIN — ACETAMINOPHEN 650 MG: 325 TABLET ORAL at 00:15

## 2018-04-29 RX ADMIN — PANTOPRAZOLE SODIUM 40 MG: 40 INJECTION, POWDER, FOR SOLUTION INTRAVENOUS at 20:28

## 2018-04-29 RX ADMIN — Medication 1 MG: at 01:43

## 2018-04-29 RX ADMIN — ATORVASTATIN CALCIUM 10 MG: 10 TABLET, FILM COATED ORAL at 22:28

## 2018-04-29 RX ADMIN — MIRTAZAPINE 30 MG: 30 TABLET, ORALLY DISINTEGRATING ORAL at 22:28

## 2018-04-29 RX ADMIN — FOLIC ACID: 5 INJECTION, SOLUTION INTRAMUSCULAR; INTRAVENOUS; SUBCUTANEOUS at 00:17

## 2018-04-29 RX ADMIN — TAMSULOSIN HYDROCHLORIDE 0.4 MG: 0.4 CAPSULE ORAL at 08:01

## 2018-04-29 RX ADMIN — FOLIC ACID: 5 INJECTION, SOLUTION INTRAMUSCULAR; INTRAVENOUS; SUBCUTANEOUS at 22:32

## 2018-04-29 RX ADMIN — DULOXETINE HYDROCHLORIDE 60 MG: 60 CAPSULE, DELAYED RELEASE ORAL at 13:24

## 2018-04-29 RX ADMIN — MIRTAZAPINE 30 MG: 30 TABLET, ORALLY DISINTEGRATING ORAL at 00:16

## 2018-04-29 RX ADMIN — PREGABALIN 25 MG: 25 CAPSULE ORAL at 16:20

## 2018-04-29 RX ADMIN — PANTOPRAZOLE SODIUM 40 MG: 40 INJECTION, POWDER, FOR SOLUTION INTRAVENOUS at 07:39

## 2018-04-29 RX ADMIN — Medication 5 MG: at 20:42

## 2018-04-29 RX ADMIN — Medication 5 MG: at 04:36

## 2018-04-29 RX ADMIN — HYDROMORPHONE HYDROCHLORIDE 0.5 MG: 1 INJECTION, SOLUTION INTRAMUSCULAR; INTRAVENOUS; SUBCUTANEOUS at 06:09

## 2018-04-29 RX ADMIN — CEFTRIAXONE 1 G: 1 INJECTION, POWDER, FOR SOLUTION INTRAMUSCULAR; INTRAVENOUS at 20:28

## 2018-04-29 RX ADMIN — ACETAMINOPHEN 650 MG: 325 TABLET ORAL at 20:42

## 2018-04-29 RX ADMIN — PREGABALIN 25 MG: 25 CAPSULE ORAL at 22:28

## 2018-04-29 RX ADMIN — ACETAMINOPHEN 650 MG: 325 TABLET ORAL at 16:20

## 2018-04-29 RX ADMIN — HYDROMORPHONE HYDROCHLORIDE 0.5 MG: 1 INJECTION, SOLUTION INTRAMUSCULAR; INTRAVENOUS; SUBCUTANEOUS at 03:07

## 2018-04-29 RX ADMIN — HYDROMORPHONE HYDROCHLORIDE 0.5 MG: 1 INJECTION, SOLUTION INTRAMUSCULAR; INTRAVENOUS; SUBCUTANEOUS at 00:15

## 2018-04-29 RX ADMIN — HYDROMORPHONE HYDROCHLORIDE 0.5 MG: 1 INJECTION, SOLUTION INTRAMUSCULAR; INTRAVENOUS; SUBCUTANEOUS at 11:27

## 2018-04-29 ASSESSMENT — VISUAL ACUITY
OU: NORMAL ACUITY

## 2018-04-29 ASSESSMENT — PAIN DESCRIPTION - DESCRIPTORS
DESCRIPTORS: ACHING;DISCOMFORT;SHARP

## 2018-04-29 ASSESSMENT — ACTIVITIES OF DAILY LIVING (ADL)
ADLS_ACUITY_SCORE: 10
ADLS_ACUITY_SCORE: 10

## 2018-04-29 NOTE — PHARMACY-ADMISSION MEDICATION HISTORY
Admission medication history interview status for the 4/26/2018  admission is complete. See EPIC admission navigator for prior to admission medications     Medication history source reliability:Good    Actions taken by pharmacist (provider contacted, etc):Called [facility] to verify medication doses for seroquel, trazodone, mirtazapine, aldatazide.     Additional medication history information not noted on PTA med list :None    Medication reconciliation/reorder completed by provider prior to medication history? No    Time spent in this activity: 20 min    Prior to Admission medications    Medication Sig Last Dose Taking? Auth Provider   albuterol (PROAIR HFA/PROVENTIL HFA/VENTOLIN HFA) 108 (90 BASE) MCG/ACT Inhaler Inhale 2 puffs into the lungs every 6 hours as needed   Yes Unknown, Entered By History   ATORVASTATIN CALCIUM PO Take 10 mg by mouth At Bedtime   Yes Unknown, Entered By History   DULoxetine (CYMBALTA) 60 MG EC capsule Take 1 capsule (60 mg) by mouth daily  Yes Brody Rand MD   fluticasone-vilanterol (BREO ELLIPTA) 200-25 MCG/INH oral inhaler Inhale 1 puff into the lungs daily as needed   Yes Unknown, Entered By History   GABAPENTIN PO Take 900 mg by mouth 3 times daily as needed  Yes Unknown, Entered By History   hydrOXYzine (ATARAX) 25 MG tablet Take 2 tablets (50 mg) by mouth nightly as needed (sleep)  Yes Dagoberto Garcia MD   methocarbamol (ROBAXIN) 750 MG tablet Take 1 tablet (750 mg) by mouth 3 times daily as needed for muscle spasms  Yes Dagoberto Garcia MD   MIRTAZAPINE PO Take 30 mg by mouth At Bedtime  Yes Unknown, Entered By History   omeprazole (PRILOSEC) 40 MG capsule Take 1 capsule (40 mg) by mouth 2 times daily (before meals) Take 30-60 minutes before a meal.  Yes Piter Sequeira,    QUETIAPINE FUMARATE PO Take 50 mg by mouth 3 times daily as needed  Yes Unknown, Entered By History   spironolactone-hydrochlorothiazide (ALDACTAZIDE) 25-25 MG per tablet Take 1 tablet by  mouth daily  Yes Unknown, Entered By History   TAMSULOSIN HCL PO Take 0.4 mg by mouth daily  Yes Unknown, Entered By History   TRAZODONE HCL PO Take 100 mg by mouth nightly as needed (Takes 2 x 100mg for a total of 200mg at bedtime)  Yes Unknown, Entered By History

## 2018-04-29 NOTE — CONSULTS
63M w/ positional headaches, small right SDH    Imaging reviewed  No surgical intervention for SDH  Agree with neurology work-up for headaches and possible CSF leak  Full consult and recs to follow

## 2018-04-29 NOTE — PROGRESS NOTES
"[facility] - NEUROLOGY PROGRESS NOTE  Name: Jim Richard  DOS: 04/29/2018  Reason for visit: Headache    SUBJECTIVE  Patient reports headache was doing fairly well for part of this morning, but became much worse just 30 minutes before my arrival, and he had to lay down. Alternating PRN oxy and dilaudid with modest benefit.    Became quite irritable during MRI last night - required additional dilaudid and diazepam to complete.      OBJECTIVE  /79  Pulse 84  Temp 99  F (37.2  C)  Resp 20  Ht 1.702 m (5' 7\")  Wt 92.6 kg (204 lb 2.3 oz)  SpO2 93%  BMI 31.97 kg/m2  GEN Lying flat in bed. Appears diaphoretic and in some pain.  HEENT No icterus. Mucous membranes moist.  MSK Reports pain over the left neck, around the paraspinal musculature near C3. Reproducible with palpation. No nuchal rigidity.  PSYC Affect wnl  NEURO  Appropriate in alertness, attention, orientation. No aphasia.  Visual fields grossly intact. PERRL. EOMI with normal smooth pursuit. Facial movements symmetric. Hearing intact to conversation. No dysarthria  Mild action tremor in the hands.    Recent labs and imaging reviewed and used in formulating the plan.      IMPRESSION  # Headache  New onset headache over left neck. Started following recent fall and head trauma. Worse with upright position.  --MR brain showed a subacute subdural hematoma, with associated meningeal enhancement, which was not present on the 3/21/18 head CT. Reviewed images with radiology - this is a non-specific finding, which could be seen either as a result of head trauma. However, in the context of positional headache, concern is raised for a CSF leak, which could also cause a new subdural hygroma. Next acute step in workup will include csf cisternography with nuclear medicine.  --No evidence of dissection on MRA neck, including fat saturated images.    # Cervical radiculopathy  MR C-spine also demonstrated multilevel neuroforaminal stenosis, including " moderate to severe left neuroforaminal stenosis at C3-C4, concerning for a C4 radiculopathy. This may explain some of his pain as well. May benefit from duloxetine and pregabalin (reports gabapentin did not work well in the past). Could also consider a muscle relaxant once clear of EtOH withdrawal, and epidural steroids as outpatient.    Recs  1. CSF Cisternogram - eval for CSF leak  2. Consult neurosurgery for management/appropriate follow-up of subdural hematoma/hygroma, and possible CSF leak  3. Continue duloxetine. Consider adding pregabalin    I spent more than 35 minutes with this patient today, more than half of which was time spent in patient/family counseling and in care coordination.    Dez Pierce MD. Neurology. Text Page  4/29/2018 12:04 PM

## 2018-04-29 NOTE — PLAN OF CARE
Problem: Patient Care Overview  Goal: Plan of Care/Patient Progress Review  Outcome: No Change  Pts went down for MRI of brain, cervical spine and neck. Result pending. Still complaining of headache. Getting oxycodone and dilaudid. NSR. Clear lung. VSS. Right hand swollen. Monitor Hgb Q 8. Last Hgb at 7.6. Transfuse only if below 7. Will continue to monitor

## 2018-04-29 NOTE — PROGRESS NOTES
GI     No concern for recurrent GI bleeding, no melena.  Tolerating po intake.   Abd is mildly distended, soft, NTTP.    Imp:  Acute blood loss anemia/variceal bleed  Decompensated etoh cirrhosis, active drinking upon admit    Recs:  Okay to stop octreotide today  ADAT  Continue on po PPI BID  Consider abd US tomorrow, check for ascites.    Wily Eller DO   [facility]  Cell [phone]

## 2018-04-29 NOTE — PLAN OF CARE
Problem: Patient Care Overview  Goal: Plan of Care/Patient Progress Review  Pt was an ICU transfer. Pt a/o x 4, forgetful. VSS. Tele was SR with BBB. Pain not well controlled with prn tylenol, oxy, scheduled lyrica. Neuros stable. History of Etoh abuse. CIWA scale, scored 5. Tremulous in BUE which appears to be baseline. Positional HA, increased discomfort with sitting upright. Tolerating regular diet, up with SBA.

## 2018-04-29 NOTE — PLAN OF CARE
Problem: Patient Care Overview  Goal: Plan of Care/Patient Progress Review  Outcome: Improving  Pt hgb stable at 7.6, q8hr checks, Ca+ replaced today, recheck in AM.  Protonix gtt d/c'd today, Octreotide gtt infusing.  Neuro consulted for pt's ongoing severe HA when sitting up, pt ok when laying down but HA will spike to 9/10-10/10 when sitting up, pt to MRI this evening, dilaudid and tylenol helping to control pain when laying down.  Leger care done, good UOP.  Pt's brother updated via phone.  Continue to monitor.

## 2018-04-29 NOTE — PROGRESS NOTES
Pt cleared by neuro and hospitalist to transfer to floor, station 73.  Report given to 73 RN.  Pt denies pain at this time, VSS.  Brother Jame in room to accompany pt to 73.  All belongings accounted for.

## 2018-04-29 NOTE — PROVIDER NOTIFICATION
Dr. Pierce (neurology)  Called Rn in MRI. Updated on pt agitation. Orders for Valium 5 mg IV and dilaudid IV if needed.

## 2018-04-30 ENCOUNTER — APPOINTMENT (OUTPATIENT)
Dept: ULTRASOUND IMAGING | Facility: CLINIC | Age: 63
DRG: 432 | End: 2018-04-30
Attending: PHYSICIAN ASSISTANT
Payer: MEDICARE

## 2018-04-30 LAB
ALBUMIN SERPL-MCNC: 2.8 G/DL (ref 3.4–5)
ALP SERPL-CCNC: 175 U/L (ref 40–150)
ALT SERPL W P-5'-P-CCNC: 36 U/L (ref 0–70)
ANION GAP SERPL CALCULATED.3IONS-SCNC: 7 MMOL/L (ref 3–14)
APPEARANCE FLD: CLEAR
AST SERPL W P-5'-P-CCNC: 50 U/L (ref 0–45)
BILIRUB DIRECT SERPL-MCNC: 0.3 MG/DL (ref 0–0.2)
BILIRUB SERPL-MCNC: 0.7 MG/DL (ref 0.2–1.3)
BUN SERPL-MCNC: 14 MG/DL (ref 7–30)
CALCIUM SERPL-MCNC: 8.1 MG/DL (ref 8.5–10.1)
CHLORIDE SERPL-SCNC: 107 MMOL/L (ref 94–109)
CO2 SERPL-SCNC: 27 MMOL/L (ref 20–32)
COLOR FLD: YELLOW
CREAT SERPL-MCNC: 1.04 MG/DL (ref 0.66–1.25)
ERYTHROCYTE [DISTWIDTH] IN BLOOD BY AUTOMATED COUNT: 18.6 % (ref 10–15)
GFR SERPL CREATININE-BSD FRML MDRD: 72 ML/MIN/1.7M2
GLUCOSE SERPL-MCNC: 103 MG/DL (ref 70–99)
HCT VFR BLD AUTO: 25.4 % (ref 40–53)
HGB BLD-MCNC: 8.1 G/DL (ref 13.3–17.7)
INR PPP: 1.13 (ref 0.86–1.14)
LYMPHOCYTES NFR FLD MANUAL: 30 %
MCH RBC QN AUTO: 27.1 PG (ref 26.5–33)
MCHC RBC AUTO-ENTMCNC: 31.9 G/DL (ref 31.5–36.5)
MCV RBC AUTO: 85 FL (ref 78–100)
MONOS+MACROS NFR FLD MANUAL: 47 %
NEUTS BAND NFR FLD MANUAL: 17 %
OTHER CELLS FLD MANUAL: 6 %
PLATELET # BLD AUTO: 134 10E9/L (ref 150–450)
POTASSIUM SERPL-SCNC: 3.4 MMOL/L (ref 3.4–5.3)
PROT SERPL-MCNC: 6.2 G/DL (ref 6.8–8.8)
RBC # BLD AUTO: 2.99 10E12/L (ref 4.4–5.9)
SODIUM SERPL-SCNC: 141 MMOL/L (ref 133–144)
SPECIMEN SOURCE FLD: NORMAL
WBC # BLD AUTO: 5.3 10E9/L (ref 4–11)
WBC # FLD AUTO: 285 /UL

## 2018-04-30 PROCEDURE — 25000125 ZZHC RX 250: Performed by: INTERNAL MEDICINE

## 2018-04-30 PROCEDURE — 25000132 ZZH RX MED GY IP 250 OP 250 PS 637: Mod: GY | Performed by: INTERNAL MEDICINE

## 2018-04-30 PROCEDURE — A9270 NON-COVERED ITEM OR SERVICE: HCPCS | Mod: GY | Performed by: INTERNAL MEDICINE

## 2018-04-30 PROCEDURE — 27210190 US PARACENTESIS

## 2018-04-30 PROCEDURE — 99231 SBSQ HOSP IP/OBS SF/LOW 25: CPT | Performed by: NURSE PRACTITIONER

## 2018-04-30 PROCEDURE — 12000000 ZZH R&B MED SURG/OB

## 2018-04-30 PROCEDURE — 25000132 ZZH RX MED GY IP 250 OP 250 PS 637: Mod: GY | Performed by: PSYCHIATRY & NEUROLOGY

## 2018-04-30 PROCEDURE — 85027 COMPLETE CBC AUTOMATED: CPT | Performed by: INTERNAL MEDICINE

## 2018-04-30 PROCEDURE — 36415 COLL VENOUS BLD VENIPUNCTURE: CPT | Performed by: INTERNAL MEDICINE

## 2018-04-30 PROCEDURE — A9270 NON-COVERED ITEM OR SERVICE: HCPCS | Mod: GY | Performed by: PSYCHIATRY & NEUROLOGY

## 2018-04-30 PROCEDURE — 40000863 ZZH STATISTIC RADIOLOGY XRAY, US, CT, MAR, NM

## 2018-04-30 PROCEDURE — 99233 SBSQ HOSP IP/OBS HIGH 50: CPT | Performed by: INTERNAL MEDICINE

## 2018-04-30 PROCEDURE — 80048 BASIC METABOLIC PNL TOTAL CA: CPT | Performed by: INTERNAL MEDICINE

## 2018-04-30 PROCEDURE — 25000128 H RX IP 250 OP 636: Performed by: INTERNAL MEDICINE

## 2018-04-30 PROCEDURE — 80076 HEPATIC FUNCTION PANEL: CPT | Performed by: INTERNAL MEDICINE

## 2018-04-30 PROCEDURE — 25000125 ZZHC RX 250: Performed by: RADIOLOGY

## 2018-04-30 PROCEDURE — 89051 BODY FLUID CELL COUNT: CPT | Performed by: PHYSICIAN ASSISTANT

## 2018-04-30 PROCEDURE — 85610 PROTHROMBIN TIME: CPT | Performed by: INTERNAL MEDICINE

## 2018-04-30 RX ORDER — LIDOCAINE HYDROCHLORIDE 10 MG/ML
10 INJECTION, SOLUTION EPIDURAL; INFILTRATION; INTRACAUDAL; PERINEURAL ONCE
Status: DISCONTINUED | OUTPATIENT
Start: 2018-04-30 | End: 2018-04-30

## 2018-04-30 RX ADMIN — CEFTRIAXONE 1 G: 1 INJECTION, POWDER, FOR SOLUTION INTRAMUSCULAR; INTRAVENOUS at 20:20

## 2018-04-30 RX ADMIN — Medication 2.5 MG: at 20:19

## 2018-04-30 RX ADMIN — MIRTAZAPINE 30 MG: 30 TABLET, ORALLY DISINTEGRATING ORAL at 21:31

## 2018-04-30 RX ADMIN — Medication 5 MG: at 00:06

## 2018-04-30 RX ADMIN — PREGABALIN 25 MG: 25 CAPSULE ORAL at 08:29

## 2018-04-30 RX ADMIN — QUETIAPINE FUMARATE 25 MG: 25 TABLET ORAL at 15:53

## 2018-04-30 RX ADMIN — Medication 5 MG: at 10:59

## 2018-04-30 RX ADMIN — ATORVASTATIN CALCIUM 10 MG: 10 TABLET, FILM COATED ORAL at 21:31

## 2018-04-30 RX ADMIN — ACETAMINOPHEN 650 MG: 325 TABLET ORAL at 20:19

## 2018-04-30 RX ADMIN — FOLIC ACID: 5 INJECTION, SOLUTION INTRAMUSCULAR; INTRAVENOUS; SUBCUTANEOUS at 21:31

## 2018-04-30 RX ADMIN — QUETIAPINE FUMARATE 25 MG: 25 TABLET ORAL at 21:33

## 2018-04-30 RX ADMIN — PREGABALIN 25 MG: 25 CAPSULE ORAL at 15:15

## 2018-04-30 RX ADMIN — DULOXETINE HYDROCHLORIDE 60 MG: 60 CAPSULE, DELAYED RELEASE ORAL at 08:29

## 2018-04-30 RX ADMIN — POLYETHYLENE GLYCOL 3350 17 G: 17 POWDER, FOR SOLUTION ORAL at 15:14

## 2018-04-30 RX ADMIN — LIDOCAINE HYDROCHLORIDE 10 ML: 10 INJECTION, SOLUTION EPIDURAL; INFILTRATION; INTRACAUDAL; PERINEURAL at 14:31

## 2018-04-30 RX ADMIN — PANTOPRAZOLE SODIUM 40 MG: 40 INJECTION, POWDER, FOR SOLUTION INTRAVENOUS at 08:31

## 2018-04-30 RX ADMIN — TAMSULOSIN HYDROCHLORIDE 0.4 MG: 0.4 CAPSULE ORAL at 08:29

## 2018-04-30 RX ADMIN — ACETAMINOPHEN 650 MG: 325 TABLET ORAL at 10:51

## 2018-04-30 RX ADMIN — FLUTICASONE FUROATE AND VILANTEROL TRIFENATATE 1 PUFF: 200; 25 POWDER RESPIRATORY (INHALATION) at 08:28

## 2018-04-30 RX ADMIN — PANTOPRAZOLE SODIUM 40 MG: 40 INJECTION, POWDER, FOR SOLUTION INTRAVENOUS at 20:20

## 2018-04-30 RX ADMIN — PREGABALIN 25 MG: 25 CAPSULE ORAL at 21:31

## 2018-04-30 ASSESSMENT — VISUAL ACUITY
OU: NORMAL ACUITY

## 2018-04-30 ASSESSMENT — ACTIVITIES OF DAILY LIVING (ADL)
ADLS_ACUITY_SCORE: 10
ADLS_ACUITY_SCORE: 10
ADLS_ACUITY_SCORE: 9
ADLS_ACUITY_SCORE: 10
ADLS_ACUITY_SCORE: 9
ADLS_ACUITY_SCORE: 9

## 2018-04-30 NOTE — CONSULTS
[facility]    Neurosurgery Consultation     Date of Admission:  4/26/2018  Date of Consult (When I saw the patient): 04/30/18    Assessment & Plan   Jim Richard is a 63 year old male who was admitted on 4/26/2018. I was asked to see the patient for SDH and positional headache.    Active Problems:    Hematemesis    Assessment: stable thin right SDH without mass effect or midline shift.     Plan:   -No surgical intervention for SDH  -Agree with neurology workup for headaches and possible CSF leak; LP and cisternogram planned for tomorrow        I have discussed the following assessment and plan with Dr. Marcelino Chaney who is in agreement with initial plan and will follow up with further consultation recommendations.    Chayo Martinez PA-C  [facility]  [address] [address]    Tel [phone]  Pager [phone]        Code Status    DNR/DNI    Reason for Consult   Reason for consult: I was asked by Dez Pierce MD to evaluate this patient for subacute hematoma and positional headache; CSF leak vs traumatic SDH.    Primary Care Physician   Vilma Romo [facility]    Chief Complaint   Headache    History is obtained from the patient and electronic health record    History of Present Illness   Jim Richard is a 63 year old male who presents with hematemesis and headaches. He has had daily headaches for approximately 1 month. States he has never had chronic headaches before. States headache is located in occipital region, worse when upright. He has also had nausea and vomiting. He presented to [facility] ED due to his hematemesis. Of note he does have longstanding history of alcohol abuse. MR Brain revealed stable thin subdural collection of right cerebral convexity without mass effect or midline shift. Denies photophobia or headache at current.     Past Medical History   I have reviewed this patient's medical history and  updated it with pertinent information if needed.   Past Medical History:   Diagnosis Date     Alcoholism (H) 1/14/2013    had treatment at [facility]     Anxiety      Coronary artery disease      Depression     w/anxiety     Heart attack      Hepatomegaly      Hyperlipemia      Hypertension      Peripheral vascular disease (H)      PVD (peripheral vascular disease) (H)      Sleep apnea      Spinal stenosis        Past Surgical History   I have reviewed this patient's surgical history and updated it with pertinent information if needed.  Past Surgical History:   Procedure Laterality Date     APPENDECTOMY       BYPASS GRAFT FEMOROPOPLITEAL  6/12/2012    Procedure: BYPASS GRAFT FEMOROPOPLITEAL;  LEFT FEMORAL TO ABOVE KNEE POPLITEAL BYPASS, ENDARTERECTOMY OF SFA AND PF ARTERIES, LEFT EXTERNAL ILIAC TO COMMON FEMORAL INTERPOSITION GRAFT;  Surgeon: Damir Roberts MD;  Location:  OR     COLONOSCOPY       ENDARTERECTOMY FEMORAL  6/12/2012    Procedure: ENDARTERECTOMY FEMORAL;;  Surgeon: Damir Roberts MD;  Location:  OR     ESOPHAGOSCOPY, GASTROSCOPY, DUODENOSCOPY (EGD), COMBINED N/A 3/22/2018    Procedure: COMBINED ESOPHAGOSCOPY, GASTROSCOPY, DUODENOSCOPY (EGD);  ESOPHAGOSCOPY, GASTROSCOPY, DUODENOSOCPY.;  Surgeon: Valeri Pruitt MD;  Location:  OR     HERNIA REPAIR  2017    Abdominal     LAMINECTOMY, FUSION LUMBAR THREE+ LEVEL, COMBINED N/A 9/22/2014    Procedure: COMBINED LAMINECTOMY, FUSION LUMBAR THREE+ LEVEL;  Surgeon: Sebastien Pruitt MD;  Location:  OR     TONSILLECTOMY & ADENOIDECTOMY         Prior to Admission Medications   Prior to Admission Medications   Prescriptions Last Dose Informant Patient Reported? Taking?   ATORVASTATIN CALCIUM PO  Pharmacy Yes Yes   Sig: Take 10 mg by mouth At Bedtime    DULoxetine (CYMBALTA) 60 MG EC capsule  Pharmacy No Yes   Sig: Take 1 capsule (60 mg) by mouth daily   GABAPENTIN PO  Pharmacy Yes Yes   Sig: Take 900 mg by mouth 3 times daily as needed    MIRTAZAPINE PO  Pharmacy Yes Yes   Sig: Take 30 mg by mouth At Bedtime   QUETIAPINE FUMARATE PO  Pharmacy Yes Yes   Sig: Take 50 mg by mouth 3 times daily as needed   TAMSULOSIN HCL PO  Pharmacy Yes Yes   Sig: Take 0.4 mg by mouth daily   TRAZODONE HCL PO  Pharmacy Yes Yes   Sig: Take 100 mg by mouth nightly as needed (Takes 2 x 100mg for a total of 200mg at bedtime)   albuterol (PROAIR HFA/PROVENTIL HFA/VENTOLIN HFA) 108 (90 BASE) MCG/ACT Inhaler  Pharmacy Yes Yes   Sig: Inhale 2 puffs into the lungs every 6 hours as needed    fluticasone-vilanterol (BREO ELLIPTA) 200-25 MCG/INH oral inhaler  Pharmacy Yes Yes   Sig: Inhale 1 puff into the lungs daily as needed    hydrOXYzine (ATARAX) 25 MG tablet  Pharmacy No Yes   Sig: Take 2 tablets (50 mg) by mouth nightly as needed (sleep)   methocarbamol (ROBAXIN) 750 MG tablet  Pharmacy No Yes   Sig: Take 1 tablet (750 mg) by mouth 3 times daily as needed for muscle spasms   omeprazole (PRILOSEC) 40 MG capsule  Pharmacy No Yes   Sig: Take 1 capsule (40 mg) by mouth 2 times daily (before meals) Take 30-60 minutes before a meal.   spironolactone-hydrochlorothiazide (ALDACTAZIDE) 25-25 MG per tablet  Pharmacy Yes Yes   Sig: Take 1 tablet by mouth daily      Facility-Administered Medications: None     Allergies   No Known Allergies    Social History   I have reviewed this patient's social history and updated it with pertinent information if needed. Jim Richard  reports that he has been smoking Cigarettes.  He has been smoking about 1.00 pack per day. He has never used smokeless tobacco. He reports that he drinks alcohol. He reports that he does not use illicit drugs.    Family History   I have reviewed this patient's family history and updated it with pertinent information if needed.   Family History   Problem Relation Age of Onset     MENTAL ILLNESS Son      Coronary Artery Disease Early Onset Mother      Substance Abuse Father      CANCER Father      Substance Abuse  "Brother      Unknown/Adopted No family hx of      Depression No family hx of      Anxiety Disorder No family hx of      Schizophrenia No family hx of      Bipolar Disorder No family hx of      Suicide No family hx of      Dementia No family hx of      Wolfe Disease No family hx of      Parkinsonism No family hx of      Autism Spectrum Disorder No family hx of      Intellectual Disability (Mental Retardation) No family hx of        Review of Systems   CONSTITUTIONAL: NEGATIVE for fever, chills, change in weight  INTEGUMENTARY/SKIN: NEGATIVE for worrisome rashes, moles or lesions  EYES: NEGATIVE for vision changes or irritation  ENT/MOUTH: NEGATIVE for ear, mouth and throat problems  RESP: NEGATIVE for significant cough or SOB  BREAST: NEGATIVE for masses, tenderness or discharge  CV: NEGATIVE for chest pain, palpitations or peripheral edema  GI: NEGATIVE for abdominal pain, heartburn, or change in bowel habits  : NEGATIVE for frequency, dysuria, or hematuria  MUSCULOSKELETAL: NEGATIVE for significant arthralgias or myalgia  NEURO: NEGATIVE for weakness, dizziness or paresthesias  ENDOCRINE: NEGATIVE for temperature intolerance, skin/hair changes  HEME: + hematemesis  PSYCHIATRIC: NEGATIVE for changes in mood or affect    Physical Exam   Temp: 99.2  F (37.3  C) Temp src: Oral BP: 168/86 Pulse: 76 Heart Rate: 65 Resp: 18 SpO2: 96 % O2 Device: None (Room air) Oxygen Delivery: 2 LPM  Vital Signs with Ranges  Temp:  [98.5  F (36.9  C)-99.2  F (37.3  C)] 99.2  F (37.3  C)  Pulse:  [76] 76  Heart Rate:  [60-89] 65  Resp:  [8-20] 18  BP: (112-173)/(66-98) 168/86  SpO2:  [82 %-100 %] 96 %  204 lbs 2.34 oz    Heart Rate: 65, Blood pressure 168/86, pulse 76, temperature 99.2  F (37.3  C), temperature source Oral, resp. rate 18, height 5' 7\" (1.702 m), weight 204 lb 2.3 oz (92.6 kg), SpO2 96 %.  204 lbs 2.34 oz  HEENT:  Normocephalic, atraumatic.  PERRLA.  EOM s intact.   Neck:  Supple, non-tender, without " lymphadenopathy.  Heart:  No peripheral edema  Lungs:  No SOB  Abdomen:  Soft, non-tender, non-distended.   Skin:  Warm and dry.  Extremities:  Good radial and dorsalis pedis pulses bilaterally, no edema, cyanosis or clubbing.    NEUROLOGICAL EXAMINATION:   Mental status:  Alert and Oriented x 3, speech is fluent.  Cranial nerves:  II-XII intact.   Motor:  Strength is 5/5 throughout the upper and lower extremities  Shoulder Abduction:  Right:  5/5   Left:  5/5  Biceps:                      Right:  5/5   Left:  5/5  Triceps:                     Right:  5/5   Left:  5/5  Wrist Extensors:       Right:  5/5   Left:  5/5  Wrist Flexors:           Right:  5/5   Left:  5/5  interosseus :            Right:  5/5   Left:  5/5   Hip Flexor:                Right: 5/5  Left:  5/5  Hip Adductor:             Right:  5/5  Left:  5/5  Hip Abductor:             Right:  5/5  Left:  5/5  Gastroc Soleus:        Right:  5/5  Left:  5/5  Tib/Ant:                      Right:  5/5  Left:  5/5  EHL:                     Right:  5/5  Left:  5/5  Sensation:  intact  Reflexes:   Negative Babinski.  Negative Clonus.    Coordination:  Slightly shaky finger to nose testing.   Negative pronator drift.    Gait:  Deferred due to fall risk.      Data   CBC RESULTS:   Recent Labs   Lab Test  04/30/18   0612   WBC  5.3   RBC  2.99*   HGB  8.1*   HCT  25.4*   MCV  85   MCH  27.1   MCHC  31.9   RDW  18.6*   PLT  134*     Basic Metabolic Panel:  Lab Results   Component Value Date     04/30/2018      Lab Results   Component Value Date    POTASSIUM 3.4 04/30/2018     Lab Results   Component Value Date    CHLORIDE 107 04/30/2018     Lab Results   Component Value Date    KEV 8.1 04/30/2018     Lab Results   Component Value Date    CO2 27 04/30/2018     Lab Results   Component Value Date    BUN 14 04/30/2018     Lab Results   Component Value Date    CR 1.04 04/30/2018     Lab Results   Component Value Date     04/30/2018     INR:  Lab Results    Component Value Date    INR 1.13 04/30/2018    INR 1.21 04/29/2018    INR 1.52 04/26/2018    INR 1.34 03/22/2018    INR 1.31 03/21/2018    INR 1.08 10/07/2016    INR 1.08 01/12/2015    INR 1.03 06/12/2012    INR 1.09 03/08/2012

## 2018-04-30 NOTE — PROGRESS NOTES
Tolerated paracentesis without diff.  Total of 500 cc removed from abd, light re in color.   Band aid to site CDI.  Fluid will be sent to lab.  VSS reports no pain or discomfort.

## 2018-04-30 NOTE — PLAN OF CARE
Problem: Patient Care Overview  Goal: Plan of Care/Patient Progress Review  Outcome: No Change  A/O, VSS on RA. Pt endorses some weakness and dizziness when ambulating but improved vs previous days. C/O HA 8/10, PRN oxy and tylenol given, HA improved.  Last Hgb 7. 1, abdomen round/distened/firm but denies any nausea and tolerating PO well . Voiding in BR. CIWa 5. Steady on feet when ambulating in muniz. Infuvite infusing, Iv  anbx given . Baseline tremors noted per Pt. D/C pending

## 2018-04-30 NOTE — PROGRESS NOTES
"[facility]  [facility]  Gastroenterology Progress note    Interval History:      Patient feels well.  He does report abdominal fullness however.  Just ate a hamburger.        Vital Signs:      /85 (BP Location: Right arm)  Pulse 81  Temp 99.1  F (37.3  C) (Oral)  Resp 16  Ht 1.702 m (5' 7\")  Wt 92.6 kg (204 lb 2.3 oz)  SpO2 91%  BMI 31.97 kg/m2  Temp (24hrs), Av.9  F (37.2  C), Min:98.5  F (36.9  C), Max:99.2  F (37.3  C)    No data found.      Intake/Output Summary (Last 24 hours) at 18 1318  Last data filed at 18 2200   Gross per 24 hour   Intake              660 ml   Output              125 ml   Net              535 ml         Constitutional: NAD, comfortable  Abdomen: soft but moderate distention, +BS, nontender    Additional Comments:  ROS, FH, SH: See initial GI consult for details.    Laboratory Data:  Recent Labs   Lab Test  18   06188  18   1445  18   0610   180   18   WBC  5.3   --    --   5.0   --   5.3   < >  6.6   HGB  8.1*  7.1*  7.9*  7.6*   < >  7.2*   < >  5.8*   MCV  85   --    --   85   --   84   < >  81   PLT  134*   --    --   101*   --   99*   < >  151   INR  1.13   --    --   1.21*   --    --    --   1.52*    < > = values in this interval not displayed.     Recent Labs   Lab Test  18   0610  18   041   NA  141  139  142   POTASSIUM  3.4  3.4  3.7   CHLORIDE  107  106  111*   CO2  27  25  24   BUN  14  21  34*   CR  1.04  1.09  1.08   ANIONGAP  7  8  7   KEV  8.1*  7.6*  7.2*     Recent Labs   Lab Test  18   0618   0410  18   1940   18   1555   01/12/15   2305  14   0510   12   0550   ALBUMIN  2.8*  2.6*  2.7*   < >  2.9*   < >  3.2*   --    < >   --    BILITOTAL  0.7  0.7  1.1   < >  0.9   < >  1.9*   --    < >   --    DBIL  0.3*  0.3*   --    --    --    --   0.7*   --    --    --    ALT  36  36  " 48   < >  74*   < >  43   --    < >   --    AST  50*  58*  68*   < >  136*   < >  65*   --    < >   --    ALKPHOS  175*  131  166*   < >  172*   < >  132   --    < >   --    PROTEIN   --    --    --    --    --    --    --   Negative   --   Negative   LIPASE   --    --    --    --   316   --    --    --    --    --     < > = values in this interval not displayed.         Assessment:    ETOH cirrhosis  Hematemesis, resolved  Abdominal distention, ? ascites  Plan:    -US paracentesis today  -PPI BID  -Low sodium diet  -On D/C, change IV antibiotics to Cipro orally and continue for a full 7 days  -OK for d/c from my standpoint      Susana Massey, [facility]  Office:  [phone] call if needed after 5PM  Cell:  [phone], not available after 5PM at this number

## 2018-04-30 NOTE — PROGRESS NOTES
[facility]  Neurology Daily Note      Admission Date:4/26/2018   Date of service: 04/30/2018   Hospital Day: 5      Assessment & Plan   _______________________________  #. (R51) Positional headache  --MRI brain with thin subdural collection along the posterior aspect of the right cerebral convexity  -----neurosurgery evaluated and deemed nonsurgical  --headache positional for about 4 weeks since falling and hitting head  ----suspect possible CSF leak related to fall  ----cisternogram scheduled for tomorrow  #. (K92.0) Hematemesis  (primary encounter diagnosis)  --management per primary service/GI  #. PT/OT/Speech  --continue evaluations  #. Nutrition  --Per speech therapy evaluation   #. DVT Prophylaxis  --per primary service    Code Status: DNR/DNI    Disposition: pending    Interval History   _______________________________  Patient presented 4/27/18 with hematemesis. Had headaches for approximately the last month. About a month ago he reports he fell and hit his head. Since about that time he has had daily headache located over the left occiput, mostly a dull pain, however can be sharp at times. He reported that drinking temporarily relieved the pain, and he has been drinking more heavily in the last month because of this. Pain is worse when he sits upright, with some associated spinning sensation, feeling lightheaded.  Today, headache has been improved. He is sitting upright this afternoon eating lunch and headache is currently no present. Was unable to do cisternogram today because radiology could not get the tracer. It will be here tomorrow. Will plan for cisternogram, pending patient improvement.    Review of Systems   _______________________________  The Review of Systems is negative other than noted in the HPI  Physical Exam   _______________________________  Vitals: Temp: 99.2  F (37.3  C) Temp src: Oral BP: 168/86 Pulse: 76 Heart Rate: 65 Resp: 18 SpO2: 96 % O2 Device: None (Room air)  Oxygen Delivery: 2 LPM  Vital Signs with Ranges: Temp:  [98.5  F (36.9  C)-99.2  F (37.3  C)] 99.2  F (37.3  C)  Pulse:  [76] 76  Heart Rate:  [60-89] 65  Resp:  [8-20] 18  BP: (126-173)/(73-98) 168/86  SpO2:  [82 %-100 %] 96 %    General Appearance:  No acute distress  Neuro:       Mental Status Exam:   Awake, alert, oriented X3. Speech and language are intact. Mental status is normal       Cranial Nerves:  Pupils 3 mm, reactive. EOMI. Face sensation is normal. Face is symmetric. Tongue and uvula are midline. Other CN are normal           Motor:  5/5 X 4. Tone and bulk are normal           Reflexes:  Normal DTR. Toes downgoing.        Sensory:  Normal to light touch               Coordination:   Intact finger-to-nose        Gait:  No significant difficulties  Cardiovascular: Regular rate and rhythm, no m/r/g  Lungs: Clear to auscultation  Abdomen: Soft, not tender, not distended  Extremities: No clubbing, no cyanosis, no edema    Medications   _______________________________    - MEDICATION INSTRUCTIONS -         atorvastatin (LIPITOR) tablet 10 mg  10 mg Oral At Bedtime     cefTRIAXone  1 g Intravenous Q24H     DULoxetine  60 mg Oral Daily     fluticasone-vilanterol  1 puff Inhalation Daily     mirtazapine  30 mg Orally disintegrating tablet At Bedtime     pantoprazole (PROTONIX) IV  40 mg Intravenous Q12H     pregabalin  25 mg Oral TID     sodium chloride (PF)  3 mL Intracatheter Q8H     IV Fluid with vitamins   Intravenous Q24H     tamsulosin (FLOMAX) capsule 0.4 mg  0.4 mg Oral Daily       Data   _______________________________      Lab Data:   All data was reviewed by me personally  CBC RESULTS:  Recent Labs   Lab Test  04/30/18   0612  04/29/18   2138  04/29/18   1445  04/29/18   0610   04/28/18   0410   WBC  5.3   --    --   5.0   --   5.3   RBC  2.99*   --    --   2.74*   --   2.61*   HGB  8.1*  7.1*  7.9*  7.6*   < >  7.2*   HCT  25.4*  22.3*  24.7*  23.2*   < >  21.8*   PLT  134*   --    --   101*   --    99*    < > = values in this interval not displayed.     Basic Metabolic Panel:  Recent Labs   Lab Test  04/30/18   0612  04/29/18   0610  04/28/18   0410   NA  141  139  142   POTASSIUM  3.4  3.4  3.7   CHLORIDE  107  106  111*   CO2  27  25  24   BUN  14  21  34*   CR  1.04  1.09  1.08   GLC  103*  104*  107*   KEV  8.1*  7.6*  7.2*     Liver panel:  Recent Labs   Lab Test  04/30/18   0612  04/28/18   0410  04/26/18 1940 04/21/18   0319  03/24/18   0830   PROTTOTAL  6.2*  5.2*  5.5*  7.4  6.1*   ALBUMIN  2.8*  2.6*  2.7*  3.6  3.0*   BILITOTAL  0.7  0.7  1.1  0.8  0.8   ALKPHOS  175*  131  166*  278*  153*   AST  50*  58*  68*  149*  81*   ALT  36  36  48  79*  65     Coagulation  Recent Labs   Lab Test  04/30/18   0612  04/29/18   0610  04/26/18   1940  03/22/18   0430  03/21/18   1555   06/12/12   0530   INR  1.13  1.21*  1.52*  1.34*  1.31*   < >  1.03   PTT   --    --   27   --    --    --   28    < > = values in this interval not displayed.      Lipid Profile:  Recent Labs   Lab Test  06/12/12   0530  03/08/12   0659   CHOL  198  177   HDL  95  82   LDL  81  69   TRIG  106  131   CHOLHDLRATIO  2.1  2.2     Thyroid Panel:  Recent Labs   Lab Test  02/08/17   1640  06/21/14   0722   TSH  1.51  2.54      A1C:   Recent Labs   Lab Test  03/22/18   0430  06/12/12   0530  03/08/12   0659   A1C  Canceled, Test credited  5.2  5.1     Troponin I:   Recent Labs   Lab Test  04/26/18 1940   TROPI  <0.015     UA Results:  Recent Labs   Lab Test  09/25/14   0510   COLOR  Yellow   APPEARANCE  Clear   URINEGLC  Negative   URINEBILI  Negative   URINEKETONE  Negative   SG  1.009   UBLD  Trace*   URINEPH  6.0   PROTEIN  Negative   NITRITE  Negative   LEUKEST  Trace*   RBCU  6*   WBCU  3*        Cardiac US:   --       Neurophysiology:   --       Imaging:   All imaging studies were reviewed personally  MRI cervical spine 4/28/18:   Degenerative changes of the cervical spine as described above.    MRA neck 4/28/18:   Normal MR  angiogram of the neck.    MRI brain 4/28/18:   1. Stable thin subdural collection along the posterior aspect of the right cerebral convexity measuring up to 0.8 cm in thickness. Differential diagnosis remains chronic subdural hematoma or chronic subdural hygroma.  2. Diffuse cerebral volume loss and cerebral white matter changes consistent with chronic small vessel ischemic disease.      Text Page    Anais Hsieh, MSN, FNP-BC, RN CNRN

## 2018-04-30 NOTE — PLAN OF CARE
A&Ox3, disoriented to situation, VSS on RA with exception of bp - first bp elevated when pt was experiencing pain. Tele SR with BBB. C/o headahe pain managed with PRN oxycodone. Headache improved. Pt tends to jump out of bed and forgets IV is connected - L arm IV infiltrated. Ice applied and elevated on pillow. Banana bag running for electrolyte replacement.  Up SBA. Reg diet, voiding adequately. Continue to monitor hemoglobin; was 7.1 last night and is now 8.1 this morning. CIWA 5-6. Steady on feet. Baseline tremors. Neuros intact. DC pending; plan for Lumbar Puncture 5/1/18 - must be done before cisternogram but will not be able to perform cisternogram until 5/1 due to ordering of product. Nuc med stated patient won't have results until Thursday and will be called for X-ray for lumbar puncture 10am 5/1. Will continue to monitor.

## 2018-04-30 NOTE — PROGRESS NOTES
[facility]    Hospitalist Progress Note    Assessment & Plan   Jim Richard is a 63 year old male with history of alcoholic dependence and alcoholic liver disease, esophageal varices, COPD, ongoing tobacco and alcohol use who presented initially on 4/26/18 with hematemesis.     Hematemesis 2/2 variceal bleeding   Decompensated cirrhosis 2/2 alcoholic liver disease w/varices and ascites  Alcohol dependence w/withdrawal   Acute blood loss anemia  Likely ascites   Came in with hematemesis.  Hgb on presentation was 5.8 and he received 2 units of blood in the emergency room. EGD revealed prominent distal esophageal varices with stigmata of recent bleeding; red whale signs present.  Stomach full of fresh blood.  Six bands were placed; hemostasis was complete after procedure. Since then his hemoglobins have been stable at 7-8.  Patient admitted to continued alcohol use.  Has been on CIWA protocol and has not required Valium since 4/28.  CIWAs have been 4-6 the past 24 hours.  LFTs mildly elevated w/ ~2:1 AST:ALT ratio consistent with alcoholic hepatitis   MELD score is 11 which correlates with a 6% 3-month mortality.  Had a long discussion with the patient that his continued alcohol use was effecting his mortality and morbidity and he expressed interest in quitting but notes he has tried 6-7 times already.   - GI following and appreciate their recommendations.  Stopped Octreotide drip on 4/29.  Will continue PPI BID.  Plan for US of the abdomen   - Ceftriaxone for SBP PPX  - Continue CIWAs with Valium PRN   - Psych and Chem Dep saw the patient   - IV multivitamin, thiamine and folate     H/o COPD, currently stable.   -  Continue Breo-Ellipta     HLP  - PTA Lipitor 10 mg     Depression  - PTA Cymbalta, mirtazapine and Seroquel PRN   -  Psych saw the patient and recommended Remeron Sol-tab 30mg qhs      Headaches  Present for the past 4 weeks behind left ear and has been constant. On 4/21 underwent CT  head and was negative.  MRI of the brain on 4/28 showed a stable thin subdural collection along the posterior aspect of the right cerebral convexity.  Neurosurgery does not feel patient warrants surgery at this time.  Neurology is concerned for possible CSF leak  - Neurology following and appreciate their recommendations.  Plan for CSF cisternogram  - Neurosurgery also following and appreciate their recommendations  - Lyrica 25 mg TID   - Tylenol and Oxycodone PRN   - Ice packs PRN        # Pain Assessment:  Current Pain Score 4/30/2018   Patient currently in pain? -   Pain score (0-10) 7   Pain location -   Pain descriptors -   - Jim is experiencing pain due to headache of unknown etiology. Pain management was discussed and the plan was created in a collaborative fashion.  Jim's response to the current recommendations: compliant  - Please see the plan for pain management as documented above      DVT Prophylaxis: Pneumatic Compression Devices  Code Status: DNR/DNI    Disposition: Expected discharge TBD.  Still undergoing headache evaluation.      Godfrey Vasques, DO  Text Page (7am to 6pm)    Interval History   Patient seen and examined.  No further hematemesis.  Headache was a 7/10 earlier but improved to 4/10 after Oxycodone and ice packs.  No nausea or vomiting.  No fevers.     -Data reviewed today: I reviewed all new labs and imaging results over the last 24 hours. I personally reviewed no images or EKG's today.    Physical Exam   Temp: 99.1  F (37.3  C) Temp src: Oral BP: 145/85 Pulse: 81 Heart Rate: 65 Resp: 16 SpO2: 91 % O2 Device: None (Room air)    Vitals:    04/26/18 2002 04/26/18 2238   Weight: 93 kg (205 lb) 92.6 kg (204 lb 2.3 oz)     Vital Signs with Ranges  Temp:  [98.5  F (36.9  C)-99.2  F (37.3  C)] 99.1  F (37.3  C)  Pulse:  [76-81] 81  Heart Rate:  [65-89] 65  Resp:  [14-20] 16  BP: (131-173)/(73-98) 145/85  SpO2:  [91 %-100 %] 91 %  I/O last 3 completed shifts:  In: 1455.5 [P.O.:900;  I.V.:555.5]  Out: 910 [Urine:910]    Constitutional: Awake, alert, cooperative, no apparent distress  Respiratory: Clear to auscultation bilaterally, no crackles or wheezing  Cardiovascular: Regular rate and rhythm, normal S1 and S2, and no murmur noted  GI: Distended with positive fluid wave.  Normal bowel sounds, soft, non-tender  Skin/Integumen: No rashes, no cyanosis  MSK: Trace lower extremity edema     Medications     - MEDICATION INSTRUCTIONS -         atorvastatin (LIPITOR) tablet 10 mg  10 mg Oral At Bedtime     cefTRIAXone  1 g Intravenous Q24H     DULoxetine  60 mg Oral Daily     fluticasone-vilanterol  1 puff Inhalation Daily     mirtazapine  30 mg Orally disintegrating tablet At Bedtime     pantoprazole (PROTONIX) IV  40 mg Intravenous Q12H     pregabalin  25 mg Oral TID     sodium chloride (PF)  3 mL Intracatheter Q8H     IV Fluid with vitamins   Intravenous Q24H     tamsulosin (FLOMAX) capsule 0.4 mg  0.4 mg Oral Daily       Data     Recent Labs  Lab 04/30/18  0612 04/29/18  2138 04/29/18  1445 04/29/18  0610  04/28/18  0410  04/26/18  1940   WBC 5.3  --   --  5.0  --  5.3  < > 6.6   HGB 8.1* 7.1* 7.9* 7.6*  < > 7.2*  < > 5.8*   MCV 85  --   --  85  --  84  < > 81   *  --   --  101*  --  99*  < > 151   INR 1.13  --   --  1.21*  --   --   --  1.52*     --   --  139  --  142  < > 140   POTASSIUM 3.4  --   --  3.4  --  3.7  < > 4.1   CHLORIDE 107  --   --  106  --  111*  < > 106   CO2 27  --   --  25  --  24  < > 22   BUN 14  --   --  21  --  34*  < > 41*   CR 1.04  --   --  1.09  --  1.08  < > 0.91   ANIONGAP 7  --   --  8  --  7  < > 12   KEV 8.1*  --   --  7.6*  --  7.2*  < > 7.7*   *  --   --  104*  --  107*  < > 172*   ALBUMIN 2.8*  --   --   --   --  2.6*  --  2.7*   PROTTOTAL 6.2*  --   --   --   --  5.2*  --  5.5*   BILITOTAL 0.7  --   --   --   --  0.7  --  1.1   ALKPHOS 175*  --   --   --   --  131  --  166*   ALT 36  --   --   --   --  36  --  48   AST 50*  --   --   --    --  58*  --  68*   TROPI  --   --   --   --   --   --   --  <0.015   < > = values in this interval not displayed.    Imaging:   No results found for this or any previous visit (from the past 24 hour(s)).

## 2018-04-30 NOTE — PROVIDER NOTIFICATION
"Call placed to Dr. Vasques: \"Could I give Seroquel for anxiousness? He's scoring 5 on CIWA and said Seroquel worked well before but parameters say for agitation only.  Please address, thanks!\"    Received return call.  Seroquel ok to give for agitation and/or anxiety.  Orders changed.  "

## 2018-05-01 ENCOUNTER — APPOINTMENT (OUTPATIENT)
Dept: NUCLEAR MEDICINE | Facility: CLINIC | Age: 63
DRG: 432 | End: 2018-05-01
Attending: PSYCHIATRY & NEUROLOGY
Payer: MEDICARE

## 2018-05-01 ENCOUNTER — APPOINTMENT (OUTPATIENT)
Dept: GENERAL RADIOLOGY | Facility: CLINIC | Age: 63
DRG: 432 | End: 2018-05-01
Attending: PSYCHIATRY & NEUROLOGY
Payer: MEDICARE

## 2018-05-01 PROCEDURE — 25000132 ZZH RX MED GY IP 250 OP 250 PS 637: Mod: GY | Performed by: INTERNAL MEDICINE

## 2018-05-01 PROCEDURE — 34300033 ZZH RX 343: Performed by: INTERNAL MEDICINE

## 2018-05-01 PROCEDURE — 25000125 ZZHC RX 250: Performed by: PHYSICIAN ASSISTANT

## 2018-05-01 PROCEDURE — A9270 NON-COVERED ITEM OR SERVICE: HCPCS | Mod: GY | Performed by: INTERNAL MEDICINE

## 2018-05-01 PROCEDURE — 25000128 H RX IP 250 OP 636

## 2018-05-01 PROCEDURE — 62270 DX LMBR SPI PNXR: CPT

## 2018-05-01 PROCEDURE — A9548 IN111 PENTETATE: HCPCS | Performed by: INTERNAL MEDICINE

## 2018-05-01 PROCEDURE — A9270 NON-COVERED ITEM OR SERVICE: HCPCS | Mod: GY | Performed by: PSYCHIATRY & NEUROLOGY

## 2018-05-01 PROCEDURE — 12000000 ZZH R&B MED SURG/OB

## 2018-05-01 PROCEDURE — 25000128 H RX IP 250 OP 636: Performed by: INTERNAL MEDICINE

## 2018-05-01 PROCEDURE — 25000125 ZZHC RX 250: Performed by: INTERNAL MEDICINE

## 2018-05-01 PROCEDURE — 99232 SBSQ HOSP IP/OBS MODERATE 35: CPT | Performed by: INTERNAL MEDICINE

## 2018-05-01 PROCEDURE — 25000132 ZZH RX MED GY IP 250 OP 250 PS 637: Mod: GY | Performed by: PSYCHIATRY & NEUROLOGY

## 2018-05-01 RX ORDER — HYDROMORPHONE HYDROCHLORIDE 1 MG/ML
INJECTION, SOLUTION INTRAMUSCULAR; INTRAVENOUS; SUBCUTANEOUS
Status: COMPLETED
Start: 2018-05-01 | End: 2018-05-01

## 2018-05-01 RX ORDER — INDIUM IN-111 PENTETATE DISODIUM 1 MCI/ML
800 SOLUTION INTRATHECAL ONCE
Status: COMPLETED | OUTPATIENT
Start: 2018-05-01 | End: 2018-05-01

## 2018-05-01 RX ORDER — HYDROMORPHONE HYDROCHLORIDE 1 MG/ML
0.4 INJECTION, SOLUTION INTRAMUSCULAR; INTRAVENOUS; SUBCUTANEOUS ONCE
Status: COMPLETED | OUTPATIENT
Start: 2018-05-01 | End: 2018-05-01

## 2018-05-01 RX ORDER — LIDOCAINE HYDROCHLORIDE 10 MG/ML
30 INJECTION, SOLUTION EPIDURAL; INFILTRATION; INTRACAUDAL; PERINEURAL ONCE
Status: DISCONTINUED | OUTPATIENT
Start: 2018-05-01 | End: 2018-05-01

## 2018-05-01 RX ADMIN — TAMSULOSIN HYDROCHLORIDE 0.4 MG: 0.4 CAPSULE ORAL at 08:26

## 2018-05-01 RX ADMIN — HYDROMORPHONE HYDROCHLORIDE 0.4 MG: 10 INJECTION, SOLUTION INTRAMUSCULAR; INTRAVENOUS; SUBCUTANEOUS at 12:40

## 2018-05-01 RX ADMIN — ATORVASTATIN CALCIUM 10 MG: 10 TABLET, FILM COATED ORAL at 21:50

## 2018-05-01 RX ADMIN — QUETIAPINE FUMARATE 50 MG: 25 TABLET ORAL at 08:38

## 2018-05-01 RX ADMIN — PANTOPRAZOLE SODIUM 40 MG: 40 INJECTION, POWDER, FOR SOLUTION INTRAVENOUS at 08:27

## 2018-05-01 RX ADMIN — PANTOPRAZOLE SODIUM 40 MG: 40 INJECTION, POWDER, FOR SOLUTION INTRAVENOUS at 20:51

## 2018-05-01 RX ADMIN — ACETAMINOPHEN 650 MG: 325 TABLET ORAL at 07:25

## 2018-05-01 RX ADMIN — ACETAMINOPHEN 650 MG: 325 TABLET ORAL at 15:51

## 2018-05-01 RX ADMIN — Medication 2.5 MG: at 07:24

## 2018-05-01 RX ADMIN — PREGABALIN 25 MG: 25 CAPSULE ORAL at 21:50

## 2018-05-01 RX ADMIN — PREGABALIN 25 MG: 25 CAPSULE ORAL at 15:51

## 2018-05-01 RX ADMIN — DULOXETINE HYDROCHLORIDE 60 MG: 60 CAPSULE, DELAYED RELEASE ORAL at 08:26

## 2018-05-01 RX ADMIN — FLUTICASONE FUROATE AND VILANTEROL TRIFENATATE 1 PUFF: 200; 25 POWDER RESPIRATORY (INHALATION) at 08:28

## 2018-05-01 RX ADMIN — QUETIAPINE FUMARATE 50 MG: 25 TABLET ORAL at 15:50

## 2018-05-01 RX ADMIN — Medication 5 MG: at 15:50

## 2018-05-01 RX ADMIN — INDIUM IN-111 PENTETATE DISODIUM 819 UCI.: 1 SOLUTION INTRATHECAL at 12:34

## 2018-05-01 RX ADMIN — LIDOCAINE HYDROCHLORIDE 5 ML: 10 INJECTION, SOLUTION EPIDURAL; INFILTRATION; INTRACAUDAL; PERINEURAL at 11:53

## 2018-05-01 RX ADMIN — Medication 5 MG: at 10:48

## 2018-05-01 RX ADMIN — HYDROMORPHONE HYDROCHLORIDE 0.4 MG: 1 INJECTION, SOLUTION INTRAMUSCULAR; INTRAVENOUS; SUBCUTANEOUS at 12:40

## 2018-05-01 RX ADMIN — PREGABALIN 25 MG: 25 CAPSULE ORAL at 08:26

## 2018-05-01 RX ADMIN — CEFTRIAXONE 1 G: 1 INJECTION, POWDER, FOR SOLUTION INTRAMUSCULAR; INTRAVENOUS at 20:55

## 2018-05-01 RX ADMIN — MIRTAZAPINE 30 MG: 30 TABLET, ORALLY DISINTEGRATING ORAL at 21:50

## 2018-05-01 ASSESSMENT — ACTIVITIES OF DAILY LIVING (ADL)
ADLS_ACUITY_SCORE: 9

## 2018-05-01 ASSESSMENT — VISUAL ACUITY
OU: NORMAL ACUITY

## 2018-05-01 NOTE — PROGRESS NOTES
[facility]    Neurosurgery  Daily Note    Assessment & Plan   SDH,   With alcoholic dependence and bleeding varices, positional headache  On CIWA. Will get LP and cisternogram today    Plan:  -Continue supportive and symptomatic treatment  -Non operative for SDH.   Plan per NCC, hospitalist and GI. Will continue to follow.       Jourdan Sousa    Interval History   Stable.  Doing well.  Improving slowly.  Pain is reasonably controlled.  No fevers.     Physical Exam   Temp: 98.5  F (36.9  C) Temp src: Oral BP: 128/76 Pulse: 66 Heart Rate: 64 Resp: 16 SpO2: 92 % O2 Device: None (Room air)    Vitals:    04/26/18 2002 04/26/18 2238   Weight: 205 lb (93 kg) 204 lb 2.3 oz (92.6 kg)     Vital Signs with Ranges  Temp:  [98.2  F (36.8  C)-99.1  F (37.3  C)] 98.5  F (36.9  C)  Pulse:  [66-81] 66  Heart Rate:  [64-75] 64  Resp:  [16-18] 16  BP: (125-161)/(74-85) 128/76  SpO2:  [91 %-94 %] 92 %  I/O last 3 completed shifts:  In: 403 [P.O.:400; I.V.:3]  Out: -     Alert and oriented.  Moves all extremities equally.      Medications     - MEDICATION INSTRUCTIONS -          atorvastatin (LIPITOR) tablet 10 mg  10 mg Oral At Bedtime     cefTRIAXone  1 g Intravenous Q24H     DULoxetine  60 mg Oral Daily     fluticasone-vilanterol  1 puff Inhalation Daily     mirtazapine  30 mg Orally disintegrating tablet At Bedtime     pantoprazole (PROTONIX) IV  40 mg Intravenous Q12H     pregabalin  25 mg Oral TID     sodium chloride (PF)  3 mL Intracatheter Q8H     tamsulosin (FLOMAX) capsule 0.4 mg  0.4 mg Oral Daily             Jourdan Sousa [facility]  [facility]

## 2018-05-01 NOTE — PROGRESS NOTES
RADIOLOGY PROCEDURE NOTE  Patient name: Jim Richard  MRN: [id number]  : 1955    Pre-procedure diagnosis: Postural headache  Post-procedure diagnosis: Same    Procedure Date/Time: May 1, 2018  12:32 PM  Procedure: Lumbar puncture for cisternogram  Estimated blood loss: None  Specimen(s) collected with description: none  The patient tolerated the procedure well with no immediate complications.  Significant findings:Instillation of indium for cisternogram    See imaging dictation for procedural details.    Provider name: Sharan Alfred  Assistant(s):None

## 2018-05-01 NOTE — PROGRESS NOTES
[facility]    Hospitalist Progress Note    Assessment & Plan   Jim Richard is a 63 year old male with history of alcoholic dependence and alcoholic liver disease, esophageal varices, COPD, ongoing tobacco and alcohol use who presented initially on 4/26/18 with hematemesis.     Hematemesis 2/2 variceal bleeding   Decompensated cirrhosis 2/2 alcoholic liver disease w/varices and ascites  Alcohol dependence w/withdrawal   Acute blood loss anemia  Likely ascites   Came in with hematemesis.  Hgb on presentation was 5.8 and he received 2 units of blood in the emergency room. EGD revealed prominent distal esophageal varices with stigmata of recent bleeding; red whale signs present.  Stomach full of fresh blood.  Six bands were placed; hemostasis was complete after procedure. Since then his hemoglobins have been stable at 7-8.  Patient admitted to continued alcohol use.  Has been on CIWA protocol and has not required Valium since 4/28.  CIWAs have been 4-6 the past 24 hours.  LFTs mildly elevated w/ ~2:1 AST:ALT ratio consistent with alcoholic hepatitis   MELD score is 11 which correlates with a 6% 3-month mortality.  Had a long discussion with the patient that his continued alcohol use was effecting his mortality and morbidity and he expressed interest in quitting but notes he has tried 6-7 times already.   - GI following and appreciate their recommendations.  Stopped Octreotide drip on 4/29.  Will continue PPI BID.   -ultrasound guided paracentesis done with 500 ml removed  -absolute PMN count only 48     - Ceftriaxone for SBP PPX, will stop antibiotics  On discharge   - Continue CIWAs with Valium PRN   - Psych and Chem Dep saw the patient   - IV multivitamin, thiamine and folate     H/o COPD, currently stable.   -  Continue Breo-Ellipta     HLP  - PTA Lipitor 10 mg     Depression  - PTA Cymbalta, mirtazapine and Seroquel PRN   -  Psych saw the patient and recommended Remeron Sol-tab 30mg  qhs      Headaches  Present for the past 4 weeks behind left ear and has been constant. On 4/21 underwent CT head and was negative.  MRI of the brain on 4/28 showed a stable thin subdural collection along the posterior aspect of the right cerebral convexity.  Neurosurgery does not feel patient warrants surgery at this time.  Neurology is concerned for possible CSF leak  - Neurology following and appreciate their recommendations.  Plan for CSF cisternogram today   - Neurosurgery also following and appreciate their recommendations  - Lyrica 25 mg TID   - Tylenol and Oxycodone PRN   - Ice packs PRN        # Pain Assessment:  Current Pain Score 5/1/2018   Patient currently in pain? yes   Pain score (0-10) 8   Pain location -   Pain descriptors -   - Jim is experiencing pain due to headache of unknown etiology. Pain management was discussed and the plan was created in a collaborative fashion.  Jim's response to the current recommendations: compliant  - Please see the plan for pain management as documented above      DVT Prophylaxis: Pneumatic Compression Devices  Code Status: DNR/DNI    Disposition: Expected discharge TBD.  Still undergoing headache evaluation.      Kailey Skinner, DO  Text Page (7am to 6pm)    Interval History   Patient resting, he had a severe headache in the morning, he would rate it as 10 out of 10.  He is having a larger is cisternogram later today, she is n.p.o. for above, no recent hematemesis, no abdominal pain.    -Data reviewed today: I reviewed all new labs and imaging results over the last 24 hours. I personally reviewed no images or EKG's today.    Physical Exam   Temp: 98.5  F (36.9  C) Temp src: Oral BP: 128/76 Pulse: 66 Heart Rate: 64 Resp: 16 SpO2: 92 % O2 Device: None (Room air)    Vitals:    04/26/18 2002 04/26/18 2238   Weight: 93 kg (205 lb) 92.6 kg (204 lb 2.3 oz)     Vital Signs with Ranges  Temp:  [98.2  F (36.8  C)-99.1  F (37.3  C)] 98.5  F (36.9  C)  Pulse:  [66-81]  66  Heart Rate:  [64-75] 64  Resp:  [16-18] 16  BP: (125-161)/(74-85) 128/76  SpO2:  [91 %-94 %] 92 %  I/O last 3 completed shifts:  In: 403 [P.O.:400; I.V.:3]  Out: -     Constitutional: Awake, alert, cooperative, no apparent distress  Respiratory: Clear to auscultation bilaterally, no crackles or wheezing  Cardiovascular: Regular rate and rhythm, normal S1 and S2, and no murmur noted  GI: Distended with positive fluid wave.  Normal bowel sounds, soft, non-tender  Skin/Integumen: No rashes, no cyanosis  MSK: Trace lower extremity edema     Medications     - MEDICATION INSTRUCTIONS -         atorvastatin (LIPITOR) tablet 10 mg  10 mg Oral At Bedtime     cefTRIAXone  1 g Intravenous Q24H     DULoxetine  60 mg Oral Daily     fluticasone-vilanterol  1 puff Inhalation Daily     mirtazapine  30 mg Orally disintegrating tablet At Bedtime     pantoprazole (PROTONIX) IV  40 mg Intravenous Q12H     pregabalin  25 mg Oral TID     sodium chloride (PF)  3 mL Intracatheter Q8H     tamsulosin (FLOMAX) capsule 0.4 mg  0.4 mg Oral Daily       Data     Recent Labs  Lab 04/30/18  0612 04/29/18  2138 04/29/18  1445 04/29/18  0610  04/28/18  0410  04/26/18  1940   WBC 5.3  --   --  5.0  --  5.3  < > 6.6   HGB 8.1* 7.1* 7.9* 7.6*  < > 7.2*  < > 5.8*   MCV 85  --   --  85  --  84  < > 81   *  --   --  101*  --  99*  < > 151   INR 1.13  --   --  1.21*  --   --   --  1.52*     --   --  139  --  142  < > 140   POTASSIUM 3.4  --   --  3.4  --  3.7  < > 4.1   CHLORIDE 107  --   --  106  --  111*  < > 106   CO2 27  --   --  25  --  24  < > 22   BUN 14  --   --  21  --  34*  < > 41*   CR 1.04  --   --  1.09  --  1.08  < > 0.91   ANIONGAP 7  --   --  8  --  7  < > 12   KEV 8.1*  --   --  7.6*  --  7.2*  < > 7.7*   *  --   --  104*  --  107*  < > 172*   ALBUMIN 2.8*  --   --   --   --  2.6*  --  2.7*   PROTTOTAL 6.2*  --   --   --   --  5.2*  --  5.5*   BILITOTAL 0.7  --   --   --   --  0.7  --  1.1   ALKPHOS 175*  --   --    --   --  131  --  166*   ALT 36  --   --   --   --  36  --  48   AST 50*  --   --   --   --  58*  --  68*   TROPI  --   --   --   --   --   --   --  <0.015   < > = values in this interval not displayed.    Imaging:   Recent Results (from the past 24 hour(s))   US Paracentesis    Narrative    ULTRASOUND PARACENTESIS April 30, 2018 2:40 PM     HISTORY: High volume paracentesis with or without diagnostic fluid  analysis with labs to be drawn if ordered. Total paracentesis volume  as much as possible.     FINDINGS: Ultrasound was used to evaluate for the presence and best  approach for paracentesis. Written and oral informed consent was  obtained. A pause for the cause procedure to verify the correct  patient and correct procedure. The skin overlying the right lower  quadrant was prepped and draped in the usual sterile fashion. The  subcutaneous tissues were anesthetized with 10 mL 1% lidocaine. A  catheter was advanced into the peritoneal space and 0.5 L of  straw  colored fluid was drained. There were no immediate complications.  Ultrasound images were permanently stored. Patient left the ultrasound  suite in satisfactory condition.      Impression    IMPRESSION: Technically successful paracentesis without immediate  complications.    TACO LINDA MD

## 2018-05-01 NOTE — PLAN OF CARE
Problem: Patient Care Overview  Goal: Plan of Care/Patient Progress Review  Outcome: No Change  A&Ox4. Neuros intact with BUE tremors. VSS. Tele SR with BBB. CIWA 3-6, d/t headache and tremors. 2gm NA diet. NPO since midnight. Up with SBA. Voiding adequately. C/o positional headache, decreased with oxycodone and tylenol. Plan for LP and cisternogram today.

## 2018-05-01 NOTE — PLAN OF CARE
Problem: Patient Care Overview  Goal: Plan of Care/Patient Progress Review  Outcome: No Change  Neuro exam intact except for tremors and left posterior headache.  CIWA 4/5/6, seroquel given for anxiety with good results.  Headache mild until up ambulating then increases to 7/10.  Decreased with ice packs, Oxycodone and Tylenol.  Paracentesis completed, 500cc drained.  No BM, sine 4/24, Miralax given.  Hgb 8.1, denies lightheadedness/dizziness with ambulation.  Plan for LP/cisternogram tomorrow, NPO after MN.

## 2018-05-01 NOTE — PLAN OF CARE
Problem: Alcohol Withdrawal Acute, Risk/Actual (Adult)  Goal: Signs and Symptoms of Listed Potential Problems Will be Absent, Minimized or Managed (Alcohol Withdrawal Acute, Risk/Actual)  Signs and symptoms of listed potential problems will be absent, minimized or managed by discharge/transition of care (reference Alcohol Withdrawal Acute, Risk/Actual (Adult) CPG).   Outcome: Improving  A&O. Pt has bilateral hand tremors, CIWA scores 3/3. Pt cisternogram pending, water only, normally 2 G sodium diet. Pt c/o pain, oral oxycodone given, IV dilaudid taken to pt during lumbar puncture. CMS intact. Pt c/o some anxiety r/t procedures, Seroquel given. Behaviorally calm and controlled, cooperative with cares. Bowel sounds active and present. VSS. Tele SR c/BBB. Up with SBA, ambulated in hallway x3. Continue to monitor.

## 2018-05-02 ENCOUNTER — APPOINTMENT (OUTPATIENT)
Dept: NUCLEAR MEDICINE | Facility: CLINIC | Age: 63
DRG: 432 | End: 2018-05-02
Attending: PSYCHIATRY & NEUROLOGY
Payer: MEDICARE

## 2018-05-02 LAB
ALBUMIN SERPL-MCNC: 2.5 G/DL (ref 3.4–5)
ALP SERPL-CCNC: 246 U/L (ref 40–150)
ALT SERPL W P-5'-P-CCNC: 40 U/L (ref 0–70)
ANION GAP SERPL CALCULATED.3IONS-SCNC: 8 MMOL/L (ref 3–14)
AST SERPL W P-5'-P-CCNC: 53 U/L (ref 0–45)
BASOPHILS # BLD AUTO: 0 10E9/L (ref 0–0.2)
BASOPHILS NFR BLD AUTO: 0.4 %
BILIRUB SERPL-MCNC: 0.7 MG/DL (ref 0.2–1.3)
BUN SERPL-MCNC: 12 MG/DL (ref 7–30)
CALCIUM SERPL-MCNC: 7.8 MG/DL (ref 8.5–10.1)
CHLORIDE SERPL-SCNC: 105 MMOL/L (ref 94–109)
CO2 SERPL-SCNC: 24 MMOL/L (ref 20–32)
CREAT SERPL-MCNC: 0.96 MG/DL (ref 0.66–1.25)
DIFFERENTIAL METHOD BLD: ABNORMAL
EOSINOPHIL # BLD AUTO: 0 10E9/L (ref 0–0.7)
EOSINOPHIL NFR BLD AUTO: 0.4 %
ERYTHROCYTE [DISTWIDTH] IN BLOOD BY AUTOMATED COUNT: 18.4 % (ref 10–15)
GFR SERPL CREATININE-BSD FRML MDRD: 79 ML/MIN/1.7M2
GLUCOSE SERPL-MCNC: 108 MG/DL (ref 70–99)
HCT VFR BLD AUTO: 23 % (ref 40–53)
HGB BLD-MCNC: 7.4 G/DL (ref 13.3–17.7)
IMM GRANULOCYTES # BLD: 0 10E9/L (ref 0–0.4)
IMM GRANULOCYTES NFR BLD: 0.2 %
LYMPHOCYTES # BLD AUTO: 0.6 10E9/L (ref 0.8–5.3)
LYMPHOCYTES NFR BLD AUTO: 11.3 %
MCH RBC QN AUTO: 26.9 PG (ref 26.5–33)
MCHC RBC AUTO-ENTMCNC: 32.2 G/DL (ref 31.5–36.5)
MCV RBC AUTO: 84 FL (ref 78–100)
MONOCYTES # BLD AUTO: 0.9 10E9/L (ref 0–1.3)
MONOCYTES NFR BLD AUTO: 18 %
NEUTROPHILS # BLD AUTO: 3.5 10E9/L (ref 1.6–8.3)
NEUTROPHILS NFR BLD AUTO: 69.7 %
NRBC # BLD AUTO: 0 10*3/UL
NRBC BLD AUTO-RTO: 0 /100
PLATELET # BLD AUTO: 131 10E9/L (ref 150–450)
POTASSIUM SERPL-SCNC: 3.5 MMOL/L (ref 3.4–5.3)
PROT SERPL-MCNC: 5.7 G/DL (ref 6.8–8.8)
RBC # BLD AUTO: 2.75 10E12/L (ref 4.4–5.9)
SODIUM SERPL-SCNC: 137 MMOL/L (ref 133–144)
WBC # BLD AUTO: 5 10E9/L (ref 4–11)

## 2018-05-02 PROCEDURE — 25000128 H RX IP 250 OP 636: Performed by: INTERNAL MEDICINE

## 2018-05-02 PROCEDURE — 25000132 ZZH RX MED GY IP 250 OP 250 PS 637: Mod: GY | Performed by: PSYCHIATRY & NEUROLOGY

## 2018-05-02 PROCEDURE — 25000132 ZZH RX MED GY IP 250 OP 250 PS 637: Mod: GY | Performed by: INTERNAL MEDICINE

## 2018-05-02 PROCEDURE — 80053 COMPREHEN METABOLIC PANEL: CPT | Performed by: INTERNAL MEDICINE

## 2018-05-02 PROCEDURE — A9270 NON-COVERED ITEM OR SERVICE: HCPCS | Mod: GY | Performed by: INTERNAL MEDICINE

## 2018-05-02 PROCEDURE — 99232 SBSQ HOSP IP/OBS MODERATE 35: CPT | Performed by: INTERNAL MEDICINE

## 2018-05-02 PROCEDURE — 99231 SBSQ HOSP IP/OBS SF/LOW 25: CPT | Performed by: NURSE PRACTITIONER

## 2018-05-02 PROCEDURE — 12000000 ZZH R&B MED SURG/OB

## 2018-05-02 PROCEDURE — 85025 COMPLETE CBC W/AUTO DIFF WBC: CPT | Performed by: INTERNAL MEDICINE

## 2018-05-02 PROCEDURE — 87040 BLOOD CULTURE FOR BACTERIA: CPT | Performed by: INTERNAL MEDICINE

## 2018-05-02 PROCEDURE — 36415 COLL VENOUS BLD VENIPUNCTURE: CPT | Performed by: INTERNAL MEDICINE

## 2018-05-02 PROCEDURE — 25000125 ZZHC RX 250: Performed by: INTERNAL MEDICINE

## 2018-05-02 PROCEDURE — A9270 NON-COVERED ITEM OR SERVICE: HCPCS | Mod: GY | Performed by: PSYCHIATRY & NEUROLOGY

## 2018-05-02 RX ORDER — NICOTINE 21 MG/24HR
1 PATCH, TRANSDERMAL 24 HOURS TRANSDERMAL DAILY
Status: DISCONTINUED | OUTPATIENT
Start: 2018-05-02 | End: 2018-05-04 | Stop reason: HOSPADM

## 2018-05-02 RX ORDER — POLYETHYLENE GLYCOL 3350 17 G
2 POWDER IN PACKET (EA) ORAL
Status: DISCONTINUED | OUTPATIENT
Start: 2018-05-02 | End: 2018-05-04 | Stop reason: HOSPADM

## 2018-05-02 RX ADMIN — QUETIAPINE FUMARATE 50 MG: 25 TABLET ORAL at 09:14

## 2018-05-02 RX ADMIN — PANTOPRAZOLE SODIUM 40 MG: 40 INJECTION, POWDER, FOR SOLUTION INTRAVENOUS at 09:02

## 2018-05-02 RX ADMIN — MIRTAZAPINE 30 MG: 30 TABLET, ORALLY DISINTEGRATING ORAL at 20:54

## 2018-05-02 RX ADMIN — PREGABALIN 25 MG: 25 CAPSULE ORAL at 20:54

## 2018-05-02 RX ADMIN — POLYETHYLENE GLYCOL 3350 17 G: 17 POWDER, FOR SOLUTION ORAL at 09:14

## 2018-05-02 RX ADMIN — CEFTRIAXONE 1 G: 1 INJECTION, POWDER, FOR SOLUTION INTRAMUSCULAR; INTRAVENOUS at 20:05

## 2018-05-02 RX ADMIN — ACETAMINOPHEN 650 MG: 325 TABLET ORAL at 11:11

## 2018-05-02 RX ADMIN — Medication 5 MG: at 00:28

## 2018-05-02 RX ADMIN — QUETIAPINE FUMARATE 50 MG: 25 TABLET ORAL at 20:57

## 2018-05-02 RX ADMIN — ACETAMINOPHEN 650 MG: 325 TABLET ORAL at 00:28

## 2018-05-02 RX ADMIN — PANTOPRAZOLE SODIUM 40 MG: 40 INJECTION, POWDER, FOR SOLUTION INTRAVENOUS at 20:05

## 2018-05-02 RX ADMIN — ACETAMINOPHEN 650 MG: 325 TABLET ORAL at 04:01

## 2018-05-02 RX ADMIN — NICOTINE 1 PATCH: 21 PATCH, EXTENDED RELEASE TRANSDERMAL at 20:04

## 2018-05-02 RX ADMIN — DULOXETINE HYDROCHLORIDE 60 MG: 60 CAPSULE, DELAYED RELEASE ORAL at 09:02

## 2018-05-02 RX ADMIN — PREGABALIN 25 MG: 25 CAPSULE ORAL at 09:02

## 2018-05-02 RX ADMIN — TAMSULOSIN HYDROCHLORIDE 0.4 MG: 0.4 CAPSULE ORAL at 09:02

## 2018-05-02 RX ADMIN — FLUTICASONE FUROATE AND VILANTEROL TRIFENATATE 1 PUFF: 200; 25 POWDER RESPIRATORY (INHALATION) at 09:14

## 2018-05-02 RX ADMIN — Medication 5 MG: at 11:10

## 2018-05-02 RX ADMIN — ATORVASTATIN CALCIUM 10 MG: 10 TABLET, FILM COATED ORAL at 20:54

## 2018-05-02 RX ADMIN — Medication 5 MG: at 04:01

## 2018-05-02 RX ADMIN — PREGABALIN 25 MG: 25 CAPSULE ORAL at 16:16

## 2018-05-02 ASSESSMENT — ACTIVITIES OF DAILY LIVING (ADL)
ADLS_ACUITY_SCORE: 9

## 2018-05-02 ASSESSMENT — VISUAL ACUITY
OU: NORMAL ACUITY

## 2018-05-02 ASSESSMENT — PAIN DESCRIPTION - DESCRIPTORS
DESCRIPTORS: HEADACHE
DESCRIPTORS: HEADACHE

## 2018-05-02 NOTE — PROVIDER NOTIFICATION
On call hospitalist was paged about Pt maintaining temp at 100.5 F after Tylenol.  Call back received from Dr. Gordon, will continue to monitor since pt is asymptomatic. Nursing will notify if temp > 101.4 F and pt becomes symptomatic.

## 2018-05-02 NOTE — PROGRESS NOTES
X-cover    Called re: persistent low-grade fever of 100.9 > 100.5. Patient is asymptomatic, no HA, no cough, urinary symptoms, abdominal pain. CIWA score ~ 3. Hemodynamically stable. Ascites on 4/30 showed 285 WBC, 17% PMNs    Underwent CSF cisternogram on 5/2 for CSF leak.     Monitor for now without further workup unless fever does not resolve, or develops symptoms, signs of infection, fever of 101.4.     CBC added to this AM's lab.

## 2018-05-02 NOTE — PLAN OF CARE
Problem: Patient Care Overview  Goal: Plan of Care/Patient Progress Review  Outcome: No Change  A&Ox4. Neuros intact. Has bilateral tremors in upper extremities, scored 3 on CIWA. VSS except for pt maintaining temps at 100.5 F. Tele is SR with BBB. 2g Sodium diet. Up with SBA. C/o back pain/ headache decreased with pain. Nursing will continue to monitor.

## 2018-05-02 NOTE — PROGRESS NOTES
SP[facility]ITUAL HE[facility] SERVICES Progress Note  [facility]    Visited pt per length of stay to introduce SH services. Pt has had a busy day and was eating a late lunch. Pt stated that he is feeling better and declined a visit at this time. SH has no formal plans to follow but is available upon request or need.      Zamzam Sharp  Chaplain Resident  Pager: [phone]  Office: [phone]

## 2018-05-02 NOTE — PROGRESS NOTES
[facility]  Neurology Daily Note      Admission Date:4/26/2018   Date of service: 05/02/2018   Hospital Day: 7      Assessment & Plan   _______________________________  #. (R51) Positional headache  --MRI brain with thin subdural collection along the posterior aspect of the right cerebral convexity  -----neurosurgery evaluated and deemed nonsurgical  --headache positional for about 4 weeks since falling and hitting head  ----suspect possible CSF leak related to fall  ----cisternogram 5/1/18  ------pending follow up imaging today  #. (K92.0) Hematemesis  (primary encounter diagnosis)  --management per primary service/GI  #. PT/OT/Speech  --continue evaluations  #. Nutrition  --Per speech therapy evaluation   #. DVT Prophylaxis  --per primary service    Code Status: DNR/DNI    Disposition: pending    Interval History   _______________________________  Patient presented 4/27/18 with hematemesis. Had headaches for approximately the last month. About a month ago he reports he fell and hit his head. Since about that time he has had daily headache located over the left occiput, mostly a dull pain, however can be sharp at times. He reported that drinking temporarily relieved the pain, and he has been drinking more heavily in the last month because of this. Pain is worse when he sits upright, with some associated spinning sensation, feeling lightheaded.  Underwent cisternogram on 5/1/18. Today he is tolerating sitting up without headache. He took a pain pill early in the morning, otherwise has been without headache. He had pain in his back yesterday after the LP to inject the tracer, otherwise had no significant headache yesterday. Pending follow up imaging from cisternogram today.     Review of Systems   _______________________________  The Review of Systems is negative other than noted in the HPI  Physical Exam   _______________________________  Vitals: Temp: 99.9  F (37.7  C) Temp src: Oral BP: 111/66    Heart Rate: 90 Resp: 18 SpO2: 92 % O2 Device: None (Room air)    Vital Signs with Ranges: Temp:  [97.6  F (36.4  C)-100.9  F (38.3  C)] 99.9  F (37.7  C)  Heart Rate:  [62-90] 90  Resp:  [16-18] 18  BP: (111-138)/(61-79) 111/66  SpO2:  [92 %-98 %] 92 %    General Appearance:  No acute distress  Neuro:       Mental Status Exam:   Awake, alert, oriented X3. Speech and language are intact. Mental status is normal       Cranial Nerves:  Pupils 3 mm, reactive. EOMI. Face sensation is normal. Face is symmetric. Tongue and uvula are midline. Other CN are normal           Motor:  5/5 X 4. Tone and bulk are normal           Reflexes:  Normal DTR. Toes downgoing.        Sensory:  Normal to light touch               Coordination:   Intact finger-to-nose        Gait:  No significant difficulties  Cardiovascular: Regular rate and rhythm, no m/r/g  Lungs: Clear to auscultation  Abdomen: Soft, not tender, not distended  Extremities: No clubbing, no cyanosis, no edema    Medications   _______________________________    - MEDICATION INSTRUCTIONS -         atorvastatin (LIPITOR) tablet 10 mg  10 mg Oral At Bedtime     cefTRIAXone  1 g Intravenous Q24H     DULoxetine  60 mg Oral Daily     fluticasone-vilanterol  1 puff Inhalation Daily     mirtazapine  30 mg Orally disintegrating tablet At Bedtime     pantoprazole (PROTONIX) IV  40 mg Intravenous Q12H     pregabalin  25 mg Oral TID     sodium chloride (PF)  3 mL Intracatheter Q8H     tamsulosin (FLOMAX) capsule 0.4 mg  0.4 mg Oral Daily       Data   _______________________________      Lab Data:   All data was reviewed by me personally  CBC RESULTS:  Recent Labs   Lab Test  05/02/18   0635  04/30/18   0612  04/29/18   2138   04/29/18   0610   WBC  5.0  5.3   --    --   5.0   RBC  2.75*  2.99*   --    --   2.74*   HGB  7.4*  8.1*  7.1*   < >  7.6*   HCT  23.0*  25.4*  22.3*   < >  23.2*   PLT  131*  134*   --    --   101*    < > = values in this interval not displayed.     Basic  Metabolic Panel:  Recent Labs   Lab Test  05/02/18   0635  04/30/18   0612  04/29/18   0610   NA  137  141  139   POTASSIUM  3.5  3.4  3.4   CHLORIDE  105  107  106   CO2  24  27  25   BUN  12  14  21   CR  0.96  1.04  1.09   GLC  108*  103*  104*   KEV  7.8*  8.1*  7.6*     Liver panel:  Recent Labs   Lab Test  05/02/18   0635  04/30/18   0612  04/28/18   0410  04/26/18   1940  04/21/18   0319   PROTTOTAL  5.7*  6.2*  5.2*  5.5*  7.4   ALBUMIN  2.5*  2.8*  2.6*  2.7*  3.6   BILITOTAL  0.7  0.7  0.7  1.1  0.8   ALKPHOS  246*  175*  131  166*  278*   AST  53*  50*  58*  68*  149*   ALT  40  36  36  48  79*     Coagulation  Recent Labs   Lab Test  04/30/18   0612  04/29/18   0610  04/26/18   1940  03/22/18   0430  03/21/18   1555   06/12/12   0530   INR  1.13  1.21*  1.52*  1.34*  1.31*   < >  1.03   PTT   --    --   27   --    --    --   28    < > = values in this interval not displayed.      Lipid Profile:  Recent Labs   Lab Test  06/12/12   0530  03/08/12   0659   CHOL  198  177   HDL  95  82   LDL  81  69   TRIG  106  131   CHOLHDLRATIO  2.1  2.2     Thyroid Panel:  Recent Labs   Lab Test  02/08/17   1640  06/21/14   0722   TSH  1.51  2.54      A1C:   Recent Labs   Lab Test  03/22/18   0430  06/12/12   0530  03/08/12   0659   A1C  Canceled, Test credited  5.2  5.1     Troponin I:   Recent Labs   Lab Test  04/26/18 1940   TROPI  <0.015     UA Results:  Recent Labs   Lab Test  09/25/14   0510   COLOR  Yellow   APPEARANCE  Clear   URINEGLC  Negative   URINEBILI  Negative   URINEKETONE  Negative   SG  1.009   UBLD  Trace*   URINEPH  6.0   PROTEIN  Negative   NITRITE  Negative   LEUKEST  Trace*   RBCU  6*   WBCU  3*        Cardiac US:   --       Neurophysiology:   --       Imaging:   All imaging studies were reviewed personally  MRI cervical spine 4/28/18:   Degenerative changes of the cervical spine as described above.    MRA neck 4/28/18:   Normal MR angiogram of the neck.    MRI brain 4/28/18:   1. Stable thin  subdural collection along the posterior aspect of the right cerebral convexity measuring up to 0.8 cm in thickness. Differential diagnosis remains chronic subdural hematoma or chronic subdural hygroma.  2. Diffuse cerebral volume loss and cerebral white matter changes consistent with chronic small vessel ischemic disease.      Text Page    Anais Hsieh, MSN, FNP-BC, RN CNRN

## 2018-05-02 NOTE — PROGRESS NOTES
[facility]    Neurosurgery Progress Note    Date of Service (when I saw the patient): 05/02/2018     Assessment & Plan   Jim Richard is a 63 year old male who was admitted on 4/26/2018.  He presented with hematemesis and headaches. He states he has had daily headaches for approximately 1 month and headache is located in occipital region mostly on the left.  His headache is worse when upright. His history is also significant for alcohol abuse. MR Brain revealed stable thin subdural collection of right cerebral convexity without mass effect or midline shift.  LP for cisternogram done yesterday; patient complains of mild soreness to low back since LP.  He is ambulating on own and exam neurologically in tact.        Active Problems:    SDH,      With alcoholic dependence and bleeding varices, positional headache     LP for cisternogram on 5/1/18     Plan:  -Continue supportive and symptomatic treatment  -Non operative for SDH.   Plan per NCC, hospitalist and GI.     I have discussed the following assessment and plan Dr Chaney who is in agreement with initial plan and will follow up with further consultation recommendations.    [facility]  [address]  [address] [place] [address]    Tel [phone]  Pager [phone]      Interval History   Stable; no changes    Physical Exam   Temp: 99.9  F (37.7  C) Temp src: Oral BP: 111/66   Heart Rate: 90 Resp: 18 SpO2: 92 % O2 Device: None (Room air)    Vitals:    04/26/18 2002 04/26/18 2238   Weight: 205 lb (93 kg) 204 lb 2.3 oz (92.6 kg)     Vital Signs with Ranges  Temp:  [97.6  F (36.4  C)-100.9  F (38.3  C)] 99.9  F (37.7  C)  Heart Rate:  [62-90] 90  Resp:  [16-18] 18  BP: (111-138)/(61-79) 111/66  SpO2:  [92 %-98 %] 92 %  I/O last 3 completed shifts:  In: 480 [P.O.:480]  Out: -     Heart Rate: 90, Blood pressure 111/66, pulse 66, temperature 99.9  F (37.7  C), temperature source  "Oral, resp. rate 18, height 5' 7\" (1.702 m), weight 204 lb 2.3 oz (92.6 kg), SpO2 92 %.  204 lbs 2.34 oz  HEENT:  Normocephalic, atraumatic.  PERRLA.  EOM s intact.    Heart:  No peripheral edema  Lungs:  No SOB  Skin:  Warm and dry, good capillary refill.  Extremities:  Good radial and dorsalis pedis pulses bilaterally, no edema, cyanosis or clubbing.    NEUROLOGICAL EXAMINATION:     Mental status:  Alert and Oriented x 3, speech is fluent.  Cranial nerves:  II-XII intact.   Motor:  Strength is 5/5 throughout the upper and lower extremities  Sensation:  Intact to light touch   Coordination:  Smooth finger to nose testing.   Negative pronator drift.   Gait:  Deferred; sitting up in chair.    Medications     - MEDICATION INSTRUCTIONS -         atorvastatin (LIPITOR) tablet 10 mg  10 mg Oral At Bedtime     cefTRIAXone  1 g Intravenous Q24H     DULoxetine  60 mg Oral Daily     fluticasone-vilanterol  1 puff Inhalation Daily     mirtazapine  30 mg Orally disintegrating tablet At Bedtime     pantoprazole (PROTONIX) IV  40 mg Intravenous Q12H     pregabalin  25 mg Oral TID     sodium chloride (PF)  3 mL Intracatheter Q8H     tamsulosin (FLOMAX) capsule 0.4 mg  0.4 mg Oral Daily       Data   CBC RESULTS:   Recent Labs   Lab Test  05/02/18   0635   WBC  5.0   RBC  2.75*   HGB  7.4*   HCT  23.0*   MCV  84   MCH  26.9   MCHC  32.2   RDW  18.4*   PLT  131*     Basic Metabolic Panel:  Lab Results   Component Value Date     05/02/2018      Lab Results   Component Value Date    POTASSIUM 3.5 05/02/2018     Lab Results   Component Value Date    CHLORIDE 105 05/02/2018     Lab Results   Component Value Date    KEV 7.8 05/02/2018     Lab Results   Component Value Date    CO2 24 05/02/2018     Lab Results   Component Value Date    BUN 12 05/02/2018     Lab Results   Component Value Date    CR 0.96 05/02/2018     Lab Results   Component Value Date     05/02/2018     INR:  Lab Results   Component Value Date    INR 1.13 " 04/30/2018    INR 1.21 04/29/2018    INR 1.52 04/26/2018    INR 1.34 03/22/2018    INR 1.31 03/21/2018    INR 1.08 10/07/2016    INR 1.08 01/12/2015    INR 1.03 06/12/2012    INR 1.09 03/08/2012

## 2018-05-02 NOTE — PROGRESS NOTES
[facility]    Hospitalist Progress Note    Assessment & Plan   Jim Richard is a 63 year old male with history of alcoholic dependence and alcoholic liver disease, esophageal varices, COPD, ongoing tobacco and alcohol use who presented initially on 4/26/18 with hematemesis.     Hematemesis 2/2 variceal bleeding   Decompensated cirrhosis 2/2 alcoholic liver disease w/varices and ascites  Alcohol dependence w/withdrawal   Acute blood loss anemia  Likely ascites   Came in with hematemesis.  Hgb on presentation was 5.8 and he received 2 units of blood in the emergency room. EGD revealed prominent distal esophageal varices with stigmata of recent bleeding; red whale signs present.  Stomach full of fresh blood.  Six bands were placed; hemostasis was complete after procedure. Since then his hemoglobins have been stable at 7-8.  Patient admitted to continued alcohol use.  Has been on CIWA protocol and has not required Valium since 4/28.  CIWAs have been 4-6 the past 24 hours.  LFTs mildly elevated w/ ~2:1 AST:ALT ratio consistent with alcoholic hepatitis   MELD score is 11 which correlates with a 6% 3-month mortality.  Had a long discussion with the patient that his continued alcohol use was effecting his mortality and morbidity and he expressed interest in quitting but notes he has tried 6-7 times already.   - GI following and appreciate their recommendations.  Stopped Octreotide drip on 4/29.  Will continue PPI BID.   -ultrasound guided paracentesis done with 500 ml removed  -absolute PMN count only 48     - Ceftriaxone for SBP PPX, will stop antibiotics  On discharge   - Continue CIWAs with Valium PRN   - Psych and Chem Dep saw the patient   - IV multivitamin, thiamine and folate   Fever  -blood culture ordered  -elevated alkphos   -will order ultrasound abdomen to evaluation the gallbladder.  H/o COPD, currently stable.   -  Continue Breo-Ellipta     HLP  - PTA Lipitor 10 mg     Depression  - PTA  Cymbalta, mirtazapine and Seroquel PRN   -  Psych saw the patient and recommended Remeron Sol-tab 30mg qhs      Headaches  Present for the past 4 weeks behind left ear and has been constant. On 4/21 underwent CT head and was negative.  MRI of the brain on 4/28 showed a stable thin subdural collection along the posterior aspect of the right cerebral convexity.  Neurosurgery does not feel patient warrants surgery at this time.  Neurology is concerned for possible CSF leak  - Neurology following and appreciate their recommendations.  Plan for CSF cisternogram ,partly done 5/1-5/2  - Neurosurgery also following and appreciate their recommendations  - Lyrica 25 mg TID   - Tylenol and Oxycodone PRN   - Ice packs PRN   -headache improving        # Pain Assessment:  Current Pain Score 5/2/2018   Patient currently in pain? -   Pain score (0-10) 7   Pain location -   Pain descriptors -   - Jim is experiencing pain due to headache of unknown etiology. Pain management was discussed and the plan was created in a collaborative fashion.  Jim's response to the current recommendations: compliant  - Please see the plan for pain management as documented above      DVT Prophylaxis: Pneumatic Compression Devices  Code Status: DNR/DNI    Disposition: Expected discharge TBD.  Still undergoing headache evaluation.      Kailey Brody, DO  Text Page (7am to 6pm)    Interval History   Headache much less today, had part of  cisternogram yesterday, had some back pain yesterday.  No hematemesis.  Had a good breakfast today morning.  He had fever yesterday, temperature  100.8- 100.9.    -Data reviewed today: I reviewed all new labs and imaging results over the last 24 hours. I personally reviewed no images or EKG's today.    Physical Exam   Temp: 99.9  F (37.7  C) Temp src: Oral BP: 111/66   Heart Rate: 90 Resp: 18 SpO2: 92 % O2 Device: None (Room air)    Vitals:    04/26/18 2002 04/26/18 2238   Weight: 93 kg (205 lb) 92.6 kg (204 lb 2.3 oz)      Vital Signs with Ranges  Temp:  [97.6  F (36.4  C)-100.9  F (38.3  C)] 99.9  F (37.7  C)  Heart Rate:  [64-90] 90  Resp:  [16-18] 18  BP: (111-138)/(61-79) 111/66  SpO2:  [92 %-98 %] 92 %  I/O last 3 completed shifts:  In: 480 [P.O.:480]  Out: -     Constitutional: Awake, alert, cooperative, no apparent distress  Respiratory: Clear to auscultation bilaterally, no crackles or wheezing  Cardiovascular: Regular rate and rhythm, normal S1 and S2, and no murmur noted  GI: Distended with positive fluid wave.  Normal bowel sounds, soft, non-tender  Skin/Integumen: No rashes, no cyanosis  MSK: Trace lower extremity edema     Medications     - MEDICATION INSTRUCTIONS -         atorvastatin (LIPITOR) tablet 10 mg  10 mg Oral At Bedtime     cefTRIAXone  1 g Intravenous Q24H     DULoxetine  60 mg Oral Daily     fluticasone-vilanterol  1 puff Inhalation Daily     mirtazapine  30 mg Orally disintegrating tablet At Bedtime     pantoprazole (PROTONIX) IV  40 mg Intravenous Q12H     pregabalin  25 mg Oral TID     sodium chloride (PF)  3 mL Intracatheter Q8H     tamsulosin (FLOMAX) capsule 0.4 mg  0.4 mg Oral Daily       Data     Recent Labs  Lab 05/02/18  0635 04/30/18  0612 04/29/18  2138  04/29/18  0610  04/26/18  1940   WBC 5.0 5.3  --   --  5.0  < > 6.6   HGB 7.4* 8.1* 7.1*  < > 7.6*  < > 5.8*   MCV 84 85  --   --  85  < > 81   * 134*  --   --  101*  < > 151   INR  --  1.13  --   --  1.21*  --  1.52*    141  --   --  139  < > 140   POTASSIUM 3.5 3.4  --   --  3.4  < > 4.1   CHLORIDE 105 107  --   --  106  < > 106   CO2 24 27  --   --  25  < > 22   BUN 12 14  --   --  21  < > 41*   CR 0.96 1.04  --   --  1.09  < > 0.91   ANIONGAP 8 7  --   --  8  < > 12   KEV 7.8* 8.1*  --   --  7.6*  < > 7.7*   * 103*  --   --  104*  < > 172*   ALBUMIN 2.5* 2.8*  --   --   --   < > 2.7*   PROTTOTAL 5.7* 6.2*  --   --   --   < > 5.5*   BILITOTAL 0.7 0.7  --   --   --   < > 1.1   ALKPHOS 246* 175*  --   --   --   < > 166*    ALT 40 36  --   --   --   < > 48   AST 53* 50*  --   --   --   < > 68*   TROPI  --   --   --   --   --   --  <0.015   < > = values in this interval not displayed.    Imaging:   No results found for this or any previous visit (from the past 24 hour(s)).

## 2018-05-03 ENCOUNTER — APPOINTMENT (OUTPATIENT)
Dept: ULTRASOUND IMAGING | Facility: CLINIC | Age: 63
DRG: 432 | End: 2018-05-03
Attending: INTERNAL MEDICINE
Payer: MEDICARE

## 2018-05-03 ENCOUNTER — APPOINTMENT (OUTPATIENT)
Dept: NUCLEAR MEDICINE | Facility: CLINIC | Age: 63
DRG: 432 | End: 2018-05-03
Attending: PSYCHIATRY & NEUROLOGY
Payer: MEDICARE

## 2018-05-03 PROCEDURE — 99231 SBSQ HOSP IP/OBS SF/LOW 25: CPT | Performed by: NURSE PRACTITIONER

## 2018-05-03 PROCEDURE — 25000132 ZZH RX MED GY IP 250 OP 250 PS 637: Mod: GY | Performed by: INTERNAL MEDICINE

## 2018-05-03 PROCEDURE — A9270 NON-COVERED ITEM OR SERVICE: HCPCS | Mod: GY | Performed by: INTERNAL MEDICINE

## 2018-05-03 PROCEDURE — 25000125 ZZHC RX 250: Performed by: INTERNAL MEDICINE

## 2018-05-03 PROCEDURE — 12000000 ZZH R&B MED SURG/OB

## 2018-05-03 PROCEDURE — 99232 SBSQ HOSP IP/OBS MODERATE 35: CPT | Performed by: INTERNAL MEDICINE

## 2018-05-03 PROCEDURE — 25000132 ZZH RX MED GY IP 250 OP 250 PS 637: Mod: GY | Performed by: PSYCHIATRY & NEUROLOGY

## 2018-05-03 PROCEDURE — 78630 CEREBROSPINAL FLUID SCAN: CPT

## 2018-05-03 PROCEDURE — 25000128 H RX IP 250 OP 636: Performed by: INTERNAL MEDICINE

## 2018-05-03 PROCEDURE — 76700 US EXAM ABDOM COMPLETE: CPT

## 2018-05-03 PROCEDURE — A9270 NON-COVERED ITEM OR SERVICE: HCPCS | Mod: GY | Performed by: PSYCHIATRY & NEUROLOGY

## 2018-05-03 RX ADMIN — FLUTICASONE FUROATE AND VILANTEROL TRIFENATATE 1 PUFF: 200; 25 POWDER RESPIRATORY (INHALATION) at 10:24

## 2018-05-03 RX ADMIN — PANTOPRAZOLE SODIUM 40 MG: 40 INJECTION, POWDER, FOR SOLUTION INTRAVENOUS at 19:30

## 2018-05-03 RX ADMIN — PREGABALIN 25 MG: 25 CAPSULE ORAL at 08:55

## 2018-05-03 RX ADMIN — QUETIAPINE FUMARATE 50 MG: 25 TABLET ORAL at 16:54

## 2018-05-03 RX ADMIN — PREGABALIN 25 MG: 25 CAPSULE ORAL at 15:59

## 2018-05-03 RX ADMIN — Medication 5 MG: at 15:59

## 2018-05-03 RX ADMIN — Medication 5 MG: at 21:23

## 2018-05-03 RX ADMIN — PANTOPRAZOLE SODIUM 40 MG: 40 INJECTION, POWDER, FOR SOLUTION INTRAVENOUS at 10:11

## 2018-05-03 RX ADMIN — TAMSULOSIN HYDROCHLORIDE 0.4 MG: 0.4 CAPSULE ORAL at 08:56

## 2018-05-03 RX ADMIN — CEFTRIAXONE 1 G: 1 INJECTION, POWDER, FOR SOLUTION INTRAMUSCULAR; INTRAVENOUS at 19:36

## 2018-05-03 RX ADMIN — ACETAMINOPHEN 650 MG: 325 TABLET ORAL at 15:58

## 2018-05-03 RX ADMIN — NICOTINE 1 PATCH: 21 PATCH, EXTENDED RELEASE TRANSDERMAL at 08:56

## 2018-05-03 RX ADMIN — ACETAMINOPHEN 650 MG: 325 TABLET ORAL at 21:23

## 2018-05-03 RX ADMIN — QUETIAPINE FUMARATE 50 MG: 25 TABLET ORAL at 10:24

## 2018-05-03 RX ADMIN — PREGABALIN 25 MG: 25 CAPSULE ORAL at 21:19

## 2018-05-03 RX ADMIN — DULOXETINE HYDROCHLORIDE 60 MG: 60 CAPSULE, DELAYED RELEASE ORAL at 08:56

## 2018-05-03 RX ADMIN — MIRTAZAPINE 30 MG: 30 TABLET, ORALLY DISINTEGRATING ORAL at 21:19

## 2018-05-03 RX ADMIN — QUETIAPINE FUMARATE 50 MG: 25 TABLET ORAL at 23:43

## 2018-05-03 RX ADMIN — ATORVASTATIN CALCIUM 10 MG: 10 TABLET, FILM COATED ORAL at 21:18

## 2018-05-03 ASSESSMENT — VISUAL ACUITY
OU: NORMAL ACUITY

## 2018-05-03 ASSESSMENT — PAIN DESCRIPTION - DESCRIPTORS: DESCRIPTORS: ACHING

## 2018-05-03 ASSESSMENT — ACTIVITIES OF DAILY LIVING (ADL)
ADLS_ACUITY_SCORE: 9

## 2018-05-03 NOTE — PROGRESS NOTES
"BRIEF NUTRITION ASSESSMENT      REASON FOR ASSESSMENT:  LOS    NUTRITION HISTORY:  Pt states he follows a regular diet and was eating well PTA.  Per chart, hx ETOH. Was drinking 0.75 liter of Vodka daily.    CURRENT DIET AND INTAKE:  Diet:  2 gm Na  Pt reports good appetite, eating mostly %.                ANTHROPOMETRICS:  Height: 5' 7\"  Weight: 92.6 kg  BMI: 31.97 kg/m2  IBW:  67.3 kg +/- 10%  Weight Status: Obesity Grade I BMI 30-34.9  %IBW: 137%  Weight History: Stable  Wt Readings from Last 10 Encounters:   04/26/18 92.6 kg (204 lb 2.3 oz)   03/23/18 91.1 kg (200 lb 13.4 oz)   12/21/17 94.3 kg (208 lb)   12/20/17 93 kg (205 lb)   02/08/17 98 kg (216 lb 1.6 oz)   10/10/16 94.3 kg (207 lb 14.3 oz)   01/25/16 94.3 kg (208 lb)   01/17/15 83.8 kg (184 lb 11.2 oz)   09/22/14 87 kg (191 lb 12.8 oz)   06/24/14 83.5 kg (184 lb)         LABS:  Labs noted    MALNUTRITION:  Patient does not meet two of the following criteria necessary for diagnosing malnutrition: significant weight loss, reduced intake, subcutaneous fat loss, muscle loss or fluid retention    NUTRITION INTERVENTION:  Nutrition Diagnosis:  No nutrition diagnosis at this time.    Implementation:  Nutrition Education:  Per Provider order if indicated    FOLLOW UP/MONITORING:   Will re-evaluate in 7 - 10 days, or sooner, if re-consulted.    Felecia Malik RD  Pager [phone] (M-F)            [phone] (W/E & Hol)            "

## 2018-05-03 NOTE — PLAN OF CARE
Problem: Patient Care Overview  Goal: Plan of Care/Patient Progress Review  Outcome: No Change  A&O. Neuros - anxious, cooperative, denying any lightheadedness, no headaches, generalized weakness. CIWA recorded.  VSS. Tele NSR with first degree AV Block and BBB. NPO for tests this am, 2 gram diet tolerated. Up in chair for most of shift/ambulated to bathroom with assist. Voiding fine/bm this am. Back discomfort/declining any pain meds or interventions.  Abdominal dressing dried drainage intact right lower quadrant.  3 bandaids on posterior back/clean/dry/intact/no hematoma. Redressed right forearm abrasion with mepilex/open area/no drainage.  Nicotine patch provided.  Seroquel provided prn. Hemoglobin 7.4  NPO after midnight for NM gallbladder test.

## 2018-05-03 NOTE — PROGRESS NOTES
[facility]  Neurology Daily Note      Admission Date:4/26/2018   Date of service: 05/03/2018   Hospital Day: 8      Assessment & Plan     ADDENDUM: 5/3/2018  4:05 PM  Cisternogram results available: No findings to suggest CSF leak.  Headache has significantly improved.  With that, headache could have been related to concussion s/p falling and hitting his head  ----------------------------------------------------  Anais Hsieh, MSN, FNP-BC, RN CNRN    _______________________________  #. (R51) Positional headache  --MRI brain with thin subdural collection along the posterior aspect of the right cerebral convexity  -----neurosurgery evaluated and deemed nonsurgical  --headache positional for about 4 weeks since falling and hitting head  ----suspect possible CSF leak related to fall  ----cisternogram 5/1/18  ------final follow up imaging today  --headache largely resolved  #. (K92.0) Hematemesis  (primary encounter diagnosis)  --management per primary service/GI  #. PT/OT/Speech  --continue evaluations  #. Nutrition  --Per speech therapy evaluation   #. DVT Prophylaxis  --per primary service    Code Status: DNR/DNI    Disposition: pending    Interval History   _______________________________  Patient presented 4/27/18 with hematemesis. Had headaches for approximately the last month. About a month ago he reports he fell and hit his head. Since about that time he has had daily headache located over the left occiput, mostly a dull pain, however can be sharp at times. He reported that drinking temporarily relieved the pain, and he has been drinking more heavily in the last month because of this. Pain is worse when he sits upright, with some associated spinning sensation, feeling lightheaded.  Underwent cisternogram on 5/1/18. He is tolerating sitting up without headache. He had pain in his back after the LP to inject the tracer which improved. Pending follow up imaging from cisternogram.     Review of  Systems   _______________________________  The Review of Systems is negative other than noted in the HPI  Physical Exam   _______________________________  Vitals: Temp: 98.7  F (37.1  C) Temp src: Oral BP: 148/78   Heart Rate: 73 Resp: 16 SpO2: 95 % O2 Device: None (Room air)    Vital Signs with Ranges: Temp:  [98.4  F (36.9  C)-99.1  F (37.3  C)] 98.7  F (37.1  C)  Heart Rate:  [68-79] 73  Resp:  [16-18] 16  BP: (100-148)/(57-80) 148/78  SpO2:  [93 %-96 %] 95 %    General Appearance:  No acute distress  Neuro:       Mental Status Exam:   Awake, alert, oriented X3. Speech and language are intact. Mental status is normal       Cranial Nerves:  Pupils 3 mm, reactive. EOMI. Face sensation is normal. Face is symmetric. Tongue and uvula are midline. Other CN are normal           Motor:  5/5 X 4. Tone and bulk are normal           Reflexes:  Normal DTR. Toes downgoing.        Sensory:  Normal to light touch               Coordination:   Intact finger-to-nose        Gait:  Up with assistance  Abdomen: Soft, not tender, not distended  Extremities: No clubbing, no cyanosis, no edema    Medications   _______________________________    - MEDICATION INSTRUCTIONS -         atorvastatin (LIPITOR) tablet 10 mg  10 mg Oral At Bedtime     cefTRIAXone  1 g Intravenous Q24H     DULoxetine  60 mg Oral Daily     fluticasone-vilanterol  1 puff Inhalation Daily     mirtazapine  30 mg Orally disintegrating tablet At Bedtime     nicotine  1 patch Transdermal Daily     nicotine   Transdermal Q8H     nicotine   Transdermal Daily     pantoprazole (PROTONIX) IV  40 mg Intravenous Q12H     pregabalin  25 mg Oral TID     sodium chloride (PF)  3 mL Intracatheter Q8H     tamsulosin (FLOMAX) capsule 0.4 mg  0.4 mg Oral Daily       Data   _______________________________      Lab Data:   All data was reviewed by me personally  CBC RESULTS:  Recent Labs   Lab Test  05/02/18   0635  04/30/18   0612  04/29/18   2138   04/29/18   0610   WBC  5.0  5.3   --     --   5.0   RBC  2.75*  2.99*   --    --   2.74*   HGB  7.4*  8.1*  7.1*   < >  7.6*   HCT  23.0*  25.4*  22.3*   < >  23.2*   PLT  131*  134*   --    --   101*    < > = values in this interval not displayed.     Basic Metabolic Panel:  Recent Labs   Lab Test  05/02/18   0635  04/30/18   0612  04/29/18   0610   NA  137  141  139   POTASSIUM  3.5  3.4  3.4   CHLORIDE  105  107  106   CO2  24  27  25   BUN  12  14  21   CR  0.96  1.04  1.09   GLC  108*  103*  104*   KEV  7.8*  8.1*  7.6*     Liver panel:  Recent Labs   Lab Test  05/02/18   0635  04/30/18   0612  04/28/18   0410  04/26/18   1940  04/21/18   0319   PROTTOTAL  5.7*  6.2*  5.2*  5.5*  7.4   ALBUMIN  2.5*  2.8*  2.6*  2.7*  3.6   BILITOTAL  0.7  0.7  0.7  1.1  0.8   ALKPHOS  246*  175*  131  166*  278*   AST  53*  50*  58*  68*  149*   ALT  40  36  36  48  79*     Coagulation  Recent Labs   Lab Test  04/30/18   0612  04/29/18   0610  04/26/18   1940  03/22/18   0430  03/21/18   1555   06/12/12   0530   INR  1.13  1.21*  1.52*  1.34*  1.31*   < >  1.03   PTT   --    --   27   --    --    --   28    < > = values in this interval not displayed.      Lipid Profile:  Recent Labs   Lab Test  06/12/12   0530  03/08/12   0659   CHOL  198  177   HDL  95  82   LDL  81  69   TRIG  106  131   CHOLHDLRATIO  2.1  2.2     Thyroid Panel:  Recent Labs   Lab Test  02/08/17   1640  06/21/14   0722   TSH  1.51  2.54      A1C:   Recent Labs   Lab Test  03/22/18   0430  06/12/12   0530  03/08/12   0659   A1C  Canceled, Test credited  5.2  5.1     Troponin I:   Recent Labs   Lab Test  04/26/18   1940   TROPI  <0.015     UA Results:  Recent Labs   Lab Test  09/25/14   0510   COLOR  Yellow   APPEARANCE  Clear   URINEGLC  Negative   URINEBILI  Negative   URINEKETONE  Negative   SG  1.009   UBLD  Trace*   URINEPH  6.0   PROTEIN  Negative   NITRITE  Negative   LEUKEST  Trace*   RBCU  6*   WBCU  3*        Cardiac US:   --       Neurophysiology:   --       Imaging:   All imaging  studies were reviewed personally  MRI cervical spine 4/28/18:   Degenerative changes of the cervical spine as described above.    MRA neck 4/28/18:   Normal MR angiogram of the neck.    MRI brain 4/28/18:   1. Stable thin subdural collection along the posterior aspect of the right cerebral convexity measuring up to 0.8 cm in thickness. Differential diagnosis remains chronic subdural hematoma or chronic subdural hygroma.  2. Diffuse cerebral volume loss and cerebral white matter changes consistent with chronic small vessel ischemic disease.      Text Page    Anais Hsieh, MSN, FNP-BC, RN CNRN

## 2018-05-03 NOTE — PLAN OF CARE
Problem: Patient Care Overview  Goal: Plan of Care/Patient Progress Review  Outcome: Improving  A&O.  VSS, RA.  Neuro intact, ex baseline UE tremor.  Headache improving, managed with tylenol and icepack.  CIWA score 3 and 2, no intervention needed.  Hgb 7.4, MD aware.  Lumbar puncture site x 3 and Paracentesis site CDI.  Seroquel x 1 administered for sleep.  Tele SR with BBB.  Up SBA.  IV SL.  NPO after midnight, US of abd in AM for eval of gallbladder.  Continue to monitor.

## 2018-05-03 NOTE — PROGRESS NOTES
[facility]    Hospitalist Progress Note    Assessment & Plan   Jim Richard is a 63 year old male with history of alcoholic dependence and alcoholic liver disease, esophageal varices, COPD, ongoing tobacco and alcohol use who presented initially on 4/26/18 with hematemesis.     Hematemesis 2/2 variceal bleeding   Decompensated cirrhosis 2/2 alcoholic liver disease w/varices and ascites  Alcohol dependence w/withdrawal   Acute blood loss anemia  Likely ascites   Came in with hematemesis.  Hgb on presentation was 5.8 and he received 2 units of blood in the emergency room. EGD revealed prominent distal esophageal varices with stigmata of recent bleeding; red whale signs present.  Stomach full of fresh blood.  Six bands were placed; hemostasis was complete after procedure. Since then his hemoglobins have been stable at 7-8.  Patient admitted to continued alcohol use.  Has been on CIWA protocol and has not required Valium since 4/28.  CIWAs have been 4-6 the past 24 hours.  LFTs mildly elevated w/ ~2:1 AST:ALT ratio consistent with alcoholic hepatitis   MELD score is 11 which correlates with a 6% 3-month mortality.  Had a long discussion with the patient that his continued alcohol use was effecting his mortality and morbidity and he expressed interest in quitting but notes he has tried 6-7 times already.   - GI following and appreciate their recommendations.  Stopped Octreotide drip on 4/29.  Will continue PPI BID.   -ultrasound guided paracentesis done with 500 ml removed  -absolute PMN count only 48     - Ceftriaxone for SBP PPX, will stop antibiotics  On discharge   - Continue CIWAs with Valium PRN   - Psych and Chem Dep saw the patient   - IV multivitamin, thiamine and folate   Fever  -blood culture ordered  -elevated alkphos   -Ultrasound shows gallbladder wall thickening but no definite cholecystitis  -HIDA scan ordered, please check the results prior to discharge.  H/o COPD, currently  stable.   -  Continue Breo-Ellipta     HLP  - PTA Lipitor 10 mg     Depression  - PTA Cymbalta, mirtazapine and Seroquel PRN   -  Psych saw the patient and recommended Remeron Sol-tab 30mg qhs      Headaches  Present for the past 4 weeks behind left ear and has been constant. On 4/21 underwent CT head and was negative.  MRI of the brain on 4/28 showed a stable thin subdural collection along the posterior aspect of the right cerebral convexity.  Neurosurgery does not feel patient warrants surgery at this time.  Neurology is concerned for possible CSF leak  - Neurology following and appreciate their recommendations.  Plan for CSF cisternogram ,partly done 5/1-5/2-5/3  - Neurosurgery also following and appreciate their recommendations  - Lyrica 25 mg TID   - Tylenol and Oxycodone PRN   - Ice packs PRN   -headache improving   -cisternogram to evaluate CSF leak As the cause of his headache       # Pain Assessment:  Current Pain Score 5/2/2018   Patient currently in pain? denies   Pain score (0-10) -   Pain location -   Pain descriptors -   - Jim is experiencing pain due to headache of unknown etiology. Pain management was discussed and the plan was created in a collaborative fashion.  Jim's response to the current recommendations: compliant  - Please see the plan for pain management as documented above      DVT Prophylaxis: Pneumatic Compression Devices  Code Status: DNR/DNI    Disposition: Expected discharge possibly tomorrow  Kailey Skinner MD  Text Page (7am to 6pm)    Interval History   No fever today.  Headache much better.  He has the last part of the cisternogram to be done today.  He had ultrasound of his abdomen done today morning due to episodes of fever on 5/2.      -Data reviewed today: I reviewed all new labs and imaging results over the last 24 hours. I personally reviewed no images or EKG's today.    Physical Exam   Temp: 98.3  F (36.8  C) Temp src: Oral BP: 137/78   Heart Rate: 76 Resp: 16 SpO2: 97 %  O2 Device: None (Room air)    Vitals:    04/26/18 2002 04/26/18 2238   Weight: 93 kg (205 lb) 92.6 kg (204 lb 2.3 oz)     Vital Signs with Ranges  Temp:  [98.3  F (36.8  C)-99.1  F (37.3  C)] 98.3  F (36.8  C)  Heart Rate:  [68-79] 76  Resp:  [16-18] 16  BP: (100-148)/(57-80) 137/78  SpO2:  [93 %-97 %] 97 %       Constitutional: Awake, alert, cooperative, no apparent distress  Respiratory: Clear to auscultation bilaterally, no crackles or wheezing  Cardiovascular: Regular rate and rhythm, normal S1 and S2, and no murmur noted  GI: Distended with positive fluid wave.  Normal bowel sounds, soft, non-tender  Skin/Integumen: No rashes, no cyanosis  MSK: Trace lower extremity edema     Medications     - MEDICATION INSTRUCTIONS -         atorvastatin (LIPITOR) tablet 10 mg  10 mg Oral At Bedtime     cefTRIAXone  1 g Intravenous Q24H     DULoxetine  60 mg Oral Daily     fluticasone-vilanterol  1 puff Inhalation Daily     mirtazapine  30 mg Orally disintegrating tablet At Bedtime     nicotine  1 patch Transdermal Daily     nicotine   Transdermal Q8H     nicotine   Transdermal Daily     pantoprazole (PROTONIX) IV  40 mg Intravenous Q12H     pregabalin  25 mg Oral TID     sodium chloride (PF)  3 mL Intracatheter Q8H     tamsulosin (FLOMAX) capsule 0.4 mg  0.4 mg Oral Daily       Data     Recent Labs  Lab 05/02/18  0635 04/30/18  0612 04/29/18  2138  04/29/18  0610  04/26/18  1940   WBC 5.0 5.3  --   --  5.0  < > 6.6   HGB 7.4* 8.1* 7.1*  < > 7.6*  < > 5.8*   MCV 84 85  --   --  85  < > 81   * 134*  --   --  101*  < > 151   INR  --  1.13  --   --  1.21*  --  1.52*    141  --   --  139  < > 140   POTASSIUM 3.5 3.4  --   --  3.4  < > 4.1   CHLORIDE 105 107  --   --  106  < > 106   CO2 24 27  --   --  25  < > 22   BUN 12 14  --   --  21  < > 41*   CR 0.96 1.04  --   --  1.09  < > 0.91   ANIONGAP 8 7  --   --  8  < > 12   KEV 7.8* 8.1*  --   --  7.6*  < > 7.7*   * 103*  --   --  104*  < > 172*   ALBUMIN 2.5*  2.8*  --   --   --   < > 2.7*   PROTTOTAL 5.7* 6.2*  --   --   --   < > 5.5*   BILITOTAL 0.7 0.7  --   --   --   < > 1.1   ALKPHOS 246* 175*  --   --   --   < > 166*   ALT 40 36  --   --   --   < > 48   AST 53* 50*  --   --   --   < > 68*   TROPI  --   --   --   --   --   --  <0.015   < > = values in this interval not displayed.    Imaging:   Recent Results (from the past 24 hour(s))   US Abdomen Complete    Narrative    ULTRASOUND ABDOMEN COMPLETE  5/3/2018 7:49 AM    HISTORY: Elevated alkaline phosphatase with fever.     COMPARISON: 10/7/2016    FINDINGS: The liver parenchyma echogenicity is diffusely increased,  suggesting hepatic steatosis. The liver also has a coarsened  echotexture with a nodular contour, suggesting cirrhosis. The liver  measures 17.5 cm. Sludge is seen within the gallbladder lumen. There  is a small amount of fluid near the gallbladder, differential includes  ascites versus small amount of pericholecystic fluid. Technologist  reports a negative sonographic Head sign. The gallbladder wall is at  the upper limits of normal measuring 3 mm. No intra- or extrahepatic  bile duct dilatation. Pancreas is partially obscured by overlying  bowel gas, but appears unremarkable where seen. The spleen is enlarged  measuring 15.8 cm. There is a simple cyst in the superior pole of the  left kidney measuring 1.4 cm. Abdominal aorta and IVC are segmentally  seen, and are of normal caliber where visualized. Trace of ascites  throughout the abdomen.      Impression    IMPRESSION:   1. Cirrhotic-appearing liver with signs of portal hypertension  including trace ascites.  2. Small amount of fluid near the gallbladder. Differential includes  pericholecystic fluid or ascites.  3. Gallbladder wall is at the upper limits of normal. Gallbladder wall  thickening is a nonspecific finding and can be seen in patients with  intrinsic hepatic disease. If there are strong clinical concern for  acute cholecystitis consider  HIDA scan.  4. Splenomegaly.     JAVIER JUDD, DO

## 2018-05-03 NOTE — PLAN OF CARE
Problem: Patient Care Overview  Goal: Plan of Care/Patient Progress Review  Outcome: Improving  A&ox4. VSS on RA. Afebrile overnight. Up with SBA. Tele SR with BBB. Denies pain/HA. Neuros intact ex tremors at baseline. CIWAs 2-4 overnight. LP sites intact. Paracentesis site intact. Mepilex to rt arm. IV SL. NPO since midnight for Abd US this AM.

## 2018-05-04 ENCOUNTER — APPOINTMENT (OUTPATIENT)
Dept: NUCLEAR MEDICINE | Facility: CLINIC | Age: 63
DRG: 432 | End: 2018-05-04
Attending: INTERNAL MEDICINE
Payer: MEDICARE

## 2018-05-04 VITALS
HEIGHT: 67 IN | BODY MASS INDEX: 32.04 KG/M2 | RESPIRATION RATE: 18 BRPM | HEART RATE: 66 BPM | OXYGEN SATURATION: 95 % | DIASTOLIC BLOOD PRESSURE: 83 MMHG | SYSTOLIC BLOOD PRESSURE: 152 MMHG | WEIGHT: 204.15 LBS | TEMPERATURE: 98.2 F

## 2018-05-04 PROCEDURE — 99239 HOSP IP/OBS DSCHRG MGMT >30: CPT | Performed by: INTERNAL MEDICINE

## 2018-05-04 PROCEDURE — A9270 NON-COVERED ITEM OR SERVICE: HCPCS | Mod: GY | Performed by: INTERNAL MEDICINE

## 2018-05-04 PROCEDURE — 25000132 ZZH RX MED GY IP 250 OP 250 PS 637: Mod: GY | Performed by: INTERNAL MEDICINE

## 2018-05-04 PROCEDURE — 78226 HEPATOBILIARY SYSTEM IMAGING: CPT

## 2018-05-04 PROCEDURE — 25000125 ZZHC RX 250: Performed by: INTERNAL MEDICINE

## 2018-05-04 RX ORDER — PREGABALIN 25 MG/1
25 CAPSULE ORAL 3 TIMES DAILY
Qty: 90 CAPSULE | Refills: 0 | Status: SHIPPED | OUTPATIENT
Start: 2018-05-04 | End: 2018-09-29

## 2018-05-04 RX ORDER — KIT FOR THE PREPARATION OF TECHNETIUM TC 99M MEBROFENIN 45 MG/10ML
6 INJECTION, POWDER, LYOPHILIZED, FOR SOLUTION INTRAVENOUS ONCE
Status: DISCONTINUED | OUTPATIENT
Start: 2018-05-04 | End: 2018-05-04 | Stop reason: HOSPADM

## 2018-05-04 RX ADMIN — TAMSULOSIN HYDROCHLORIDE 0.4 MG: 0.4 CAPSULE ORAL at 08:48

## 2018-05-04 RX ADMIN — NICOTINE 1 PATCH: 21 PATCH, EXTENDED RELEASE TRANSDERMAL at 08:49

## 2018-05-04 RX ADMIN — PREGABALIN 25 MG: 25 CAPSULE ORAL at 08:49

## 2018-05-04 RX ADMIN — DULOXETINE HYDROCHLORIDE 60 MG: 60 CAPSULE, DELAYED RELEASE ORAL at 08:49

## 2018-05-04 RX ADMIN — PANTOPRAZOLE SODIUM 40 MG: 40 INJECTION, POWDER, FOR SOLUTION INTRAVENOUS at 08:48

## 2018-05-04 RX ADMIN — ACETAMINOPHEN 650 MG: 325 TABLET ORAL at 13:21

## 2018-05-04 RX ADMIN — QUETIAPINE FUMARATE 50 MG: 25 TABLET ORAL at 08:56

## 2018-05-04 RX ADMIN — Medication 5 MG: at 13:21

## 2018-05-04 RX ADMIN — FLUTICASONE FUROATE AND VILANTEROL TRIFENATATE 1 PUFF: 200; 25 POWDER RESPIRATORY (INHALATION) at 08:56

## 2018-05-04 ASSESSMENT — ACTIVITIES OF DAILY LIVING (ADL)
ADLS_ACUITY_SCORE: 9
ADLS_ACUITY_SCORE: 10

## 2018-05-04 ASSESSMENT — VISUAL ACUITY
OU: NORMAL ACUITY

## 2018-05-04 NOTE — PROGRESS NOTES
The following appointments have been made for this patient.       May 25, 2018 11:15 AM CDT   CT HEAD W/O CONTRAST with SHCT1   [facility] ([facility])    [address] [facility] [id number] [phone]        May 30, 2018 11:00 AM CDT   Return Visit with Chayo Martinez PA-C   [facility] ([facility])    [address] [id number] [phone]                -A follow-up appointment has been made for you with Dr. Elias on Wednesday, May 16th at 11:10 AM at the [facility] at the [address] [place] [address].  Please call the clinic at [phone] should you need to change or cancel your appointment.  Please arrive 15 minutes early to your scheduled appointment and bring your photo ID, insurance card and co-payment.

## 2018-05-04 NOTE — PLAN OF CARE
Problem: Patient Care Overview  Goal: Plan of Care/Patient Progress Review  Outcome: Improving  Neuros intact except for bilateral UE tremors.  CIWA 4.  C/o back pain, oxycodone & tylenol given.  Up independently in room.  Discharge instructions reviewed and questions answered.  Pt states he plans to go to [facility] when he discharges.  Pt dc, will call his own taxi.

## 2018-05-04 NOTE — PLAN OF CARE
Problem: Patient Care Overview  Goal: Plan of Care/Patient Progress Review  Outcome: No Change  A&O.  VSS, RA.  Neuro intact, ex baseline UE tremor.  Headache improving, no sign of CSF leak per neuro.   Pain managed with tylenol and oxy.  CIWA score 2 and 2, no intervention needed.  Hgb 7.4, MD aware, no lab done today, see note.  Lumbar puncture site x 3 and Paracentesis site CDI.   Tele SR with 1st degree AVB and BBB.  Up SBA.  IV SL.  US of abd was abnormal, MD aware.  NPO after midnight, NM test in AM for eval of gallbladder.  Continue to monitor.

## 2018-05-04 NOTE — PROGRESS NOTES
Cisternogram negative.  SDH stable per MRI on 4-.  NSG will sign off at this time.  Recommend pt follow up in clinic in 4 weeks with a CT scan prior. Please contact NSG with any questions.        - Go to ER with any seizure activity, mental status change (increasing confusion), difficulty with speech or increasing or acute weakness     -  Call [phone] to schedule your follow up visit in 3-4 weeks with a  CT Scan prior.         Omayra Ray [facility]  [facility]  [address] [place] [address].  [id number]  Tel. [phone]  Fax [phone]

## 2018-05-04 NOTE — DISCHARGE INSTRUCTIONS
-A follow-up appointment has been made for you with Dr. Elias on Wednesday, May 16th at 11:10 AM at the [facility] at the [address] [place] [address].  Please call the clinic at [phone] should you need to change or cancel your appointment.  Please arrive 15 minutes early to your scheduled appointment and bring your photo ID, insurance card and co-payment.       - A follow-up appointment was scheduled for you [see above].  It is very important to go to it--bring these papers and your insurance card with you.  At the visit, talk about your hospital stay.  Tell your doctor how you feel.  Show your new list of medications.  Your doctor will update records, make sure you are still doing OK, and decide if any tests or medication changes are needed.      -Please arrive at 11:00 AM for your CT scan on Friday, May 25th and check in at the [employer] [facility] at [facility].

## 2018-05-04 NOTE — PROVIDER NOTIFICATION
MD Notification    Notified Person: MD    Notified Person Name:Dr. Skinner    Notification Date/Time:05/03/18 15:30    Notification Interaction:Web page    Purpose of Notification: No lab done to re-check of Hgb    Orders Received:    Comments:no call back

## 2018-05-04 NOTE — PLAN OF CARE
Problem: Patient Care Overview  Goal: Plan of Care/Patient Progress Review  Outcome: No Change  A&O  x4. Numbness to 1st right toe; baseline. Tremors noted to both upper extremities. Other neuros intact. CIWA 3-6, d/t tremors and anxiety. PRN Seroquel given per pt request. O2 desat when sleeping. 85-88% RA. Given 1L O2 via NC. Pt states he normally uses CPAP at home d/t apnea.  Other VSS. Tele SR with BBB. Dressings to LP and paracentesis site C/D/I. +2 edema noted to both feet, +1 in ankles. Currently NPO diet since midnight for NM in AM. Diet normally 2G Na restriction. Up with SBA. Denies pain, states headache has subsided. Plan for NM in AM to evaluate gallbladder. Discharge to home pending. Continue monitoring.

## 2018-05-04 NOTE — DISCHARGE SUMMARY
[facility]    Discharge Summary  Hospitalist    Date of Admission:  4/26/2018  Date of Discharge:  5/4/2018  1:32 PM  Discharging Provider: Godfrey Vasques DO    Discharge Diagnoses   1. Hematemesis 2/2 variceal bleeding  2. Decompensated cirrhosis due to alcoholic liver disease with ascites and varices  3. Alcohol dependence w/withdrawal  4. Acute blood loss anemia   5. H/o COPD  6. HLP   7. Depression   8. Headaches, likely post-concussive     History of Present Illness  from Dr. Chinchilla's H&P on 4/26/18  Jim Richard is an 63 year old male with past medical history significant for alcohol dependence, active tobacco use, COPD, peripheral artery disease, hypertension, dyslipidemia, depression, anxiety, BPH, chronic back pain, spinal stenosis, obstructive sleep apnea and umbilical hernia, and recent hospitalization for hematemesis secondary to esophageal varices with his alcoholic liver disease who presented to the emergency room after an episode of hematemesis today that happened at 7 p.m. this evening.  He threw up bright red blood with dark spots in it,  as per the patient.  He has been having intermittent headaches for the last 1 week.  He was seen in the emergency room on 04/21/2018 and had a CT scan of the head done. He was also intoxicated during that.  CT scan of the head returned negative and he got discharged home. To help with the headaches, he started taking ibuprofen.  He told the ER physician that he has been taking ibuprofen daily, but for me, he said he only took 2 tablets of ibuprofen today once and he felt dizzy and lightheaded all day.  Since discharge, he has been drinking every day, 0.75 liters of vodka.  His last drink was at 10 p.m. last night.  Per EMS, he had almost 2000 mL of bloody emesis, so he had IV placed en route and received 500 mL bolus.      Hospital Course   Jim Richard was admitted on 4/26/2018.  The following problems were addressed during his  hospitalization:    Hematemesis 2/2 variceal bleeding   Decompensated cirrhosis 2/2 alcoholic liver disease w/varices and ascites  Alcohol dependence w/withdrawal   Acute blood loss anemia  Likely ascites   Came in with hematemesis.  Hgb on presentation was 5.8 and he received 2 units of blood in the emergency room. EGD revealed prominent distal esophageal varices with stigmata of recent bleeding; red whale signs present.  Stomach full of fresh blood.  Six bands were placed; hemostasis was complete after procedure. Since then his hemoglobins have been stable at 7-8.  Patient admitted to continued alcohol use.  Has been on CIWA protocol and has not required Valium since 4/28.  LFTs mildly elevated w/ ~2:1 AST:ALT ratio consistent with alcoholic hepatitis   MELD score is 11 which correlates with a 6% 3-month mortality.  Had a long discussion with the patient that his continued alcohol use was effecting his mortality and morbidity and he expressed interest in quitting but notes he has tried 6-7 times already.   - While here GI was following and appreciate their recommendations. He was on an Octreotide drip until 4/29.  Was started on PPI BID.   - An ultrasound guided paracentesis was also done with 500 ml removed while here.  Absolute PMN count only 48     - Ceftriaxone for SBP PPX while here      Headaches  Present for the past 4 weeks behind left ear and has been constant. On 4/21 underwent CT head and was negative.  MRI of the brain on 4/28 showed a stable thin subdural collection along the posterior aspect of the right cerebral convexity.  Neurosurgery does not feel patient warrants surgery at this time.  Neurology is concerned for possible CSF leak but cisternogram was negative.  It was felt his headache was most likely post concussive      # Discharge Pain Plan:   - Patient currently has NO PAIN and is not being prescribed pain medications on discharge.      Godfrey Vasques, DO    Significant Results and  Procedures   See below    Pending Results   These results will be followed up by PCP  Unresulted Labs Ordered in the Past 30 Days of this Admission     Date and Time Order Name Status Description    5/2/2018 1317 Blood culture Preliminary     5/2/2018 1317 Blood culture Preliminary           Code Status   DNR / DNI       Primary Care Physician   FERNANDA BRANTLEY    Physical Exam   Temp: 98.2  F (36.8  C) Temp src: Oral BP: 152/83   Heart Rate: 85 Resp: 18 SpO2: 95 % O2 Device: None (Room air) Oxygen Delivery: 1 LPM  Vitals:    04/26/18 2002 04/26/18 2238   Weight: 93 kg (205 lb) 92.6 kg (204 lb 2.3 oz)     Vital Signs with Ranges  Temp:  [98.2  F (36.8  C)-98.5  F (36.9  C)] 98.2  F (36.8  C)  Heart Rate:  [77-88] 85  Resp:  [16-18] 18  BP: ()/(60-83) 152/83  SpO2:  [88 %-95 %] 95 %  I/O last 3 completed shifts:  In: 795 [P.O.:795]  Out: -     Constitutional: Resting comfortably in chair.  NAD  Eyes: EOMI  ENT: Moist mucous membranes  Respiratory: Clear to auscultation.  No respiratory distress  Cardiovascular: RRR.  No murmurs   GI: Bowel sounds present   Musculoskeletal: No edema    Discharge Disposition   Discharged to home  Condition at discharge: Stable    Consultations This Hospital Stay   GASTROENTEROLOGY IP CONSULT  PSYCHIATRY IP CONSULT  NEUROLOGY IP CONSULT  NEUROSURGERY IP CONSULT    Time Spent on this Encounter   IGodfrey, personally saw the patient today and spent greater than 30 minutes discharging this patient.    Discharge Orders     CT Head w/o contrast*     Follow-up and recommended labs and tests    [facility] in 3-4 weeks with a CT scan prior.  Please call the clinic at [phone] to schedule your appointment with Jourdan Sousa PA-C or Chayo Martinez PA-C     Reason for your hospital stay   GI bleed     Follow-up and recommended labs and tests    Follow up with primary care provider, FERNANDA BRANTLEY, within 2-4 week, for hospital follow-  up. No follow up labs or test are needed.    Should also follow-up with neurosurgery with CT head as directed     Activity   Your activity upon discharge: activity as tolerated     DNR/DNI     Diet   Follow this diet upon discharge: Orders Placed This Encounter     2 Gram Sodium Diet       Discharge Medications   Current Discharge Medication List      START taking these medications    Details   pregabalin (LYRICA) 25 MG capsule Take 1 capsule (25 mg) by mouth 3 times daily  Qty: 90 capsule, Refills: 0    Comments: Future refills by PCP Dr. FERNANDA BRANTLEY with phone number [phone].  Associated Diagnoses: Positional headache         CONTINUE these medications which have NOT CHANGED    Details   albuterol (PROAIR HFA/PROVENTIL HFA/VENTOLIN HFA) 108 (90 BASE) MCG/ACT Inhaler Inhale 2 puffs into the lungs every 6 hours as needed       ATORVASTATIN CALCIUM PO Take 10 mg by mouth At Bedtime       DULoxetine (CYMBALTA) 60 MG EC capsule Take 1 capsule (60 mg) by mouth daily  Qty: 30 capsule, Refills: 0    Associated Diagnoses: 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)      fluticasone-vilanterol (BREO ELLIPTA) 200-25 MCG/INH oral inhaler Inhale 1 puff into the lungs daily as needed       hydrOXYzine (ATARAX) 25 MG tablet Take 2 tablets (50 mg) by mouth nightly as needed (sleep)  Qty: 30 tablet, Refills: 0      methocarbamol (ROBAXIN) 750 MG tablet Take 1 tablet (750 mg) by mouth 3 times daily as needed for muscle spasms  Qty: 15 tablet, Refills: 0      MIRTAZAPINE PO Take 30 mg by mouth At Bedtime      omeprazole (PRILOSEC) 40 MG capsule Take 1 capsule (40 mg) by mouth 2 times daily (before meals) Take 30-60 minutes before a meal.  Qty: 60 capsule, Refills: 1    Associated Diagnoses: Hematemesis without nausea      QUETIAPINE FUMARATE PO Take 50 mg by mouth 3 times daily as needed      spironolactone-hydrochlorothiazide (ALDACTAZIDE) 25-25 MG per tablet Take 1 tablet by mouth daily       TAMSULOSIN HCL PO Take 0.4 mg by mouth daily      TRAZODONE HCL PO Take 100 mg by mouth nightly as needed (Takes 2 x 100mg for a total of 200mg at bedtime)         STOP taking these medications       GABAPENTIN PO Comments:   Reason for Stopping:             Allergies   No Known Allergies  Data   Most Recent 3 CBC's:  Recent Labs   Lab Test  05/02/18   0635  04/30/18   0612  04/29/18   2138   04/29/18   0610   WBC  5.0  5.3   --    --   5.0   HGB  7.4*  8.1*  7.1*   < >  7.6*   MCV  84  85   --    --   85   PLT  131*  134*   --    --   101*    < > = values in this interval not displayed.      Most Recent 3 BMP's:  Recent Labs   Lab Test  05/02/18   0635  04/30/18   0612  04/29/18   0610   NA  137  141  139   POTASSIUM  3.5  3.4  3.4   CHLORIDE  105  107  106   CO2  24  27  25   BUN  12  14  21   CR  0.96  1.04  1.09   ANIONGAP  8  7  8   KEV  7.8*  8.1*  7.6*   GLC  108*  103*  104*     Most Recent 2 LFT's:  Recent Labs   Lab Test  05/02/18   0635  04/30/18   0612   AST  53*  50*   ALT  40  36   ALKPHOS  246*  175*   BILITOTAL  0.7  0.7     Most Recent INR's and Anticoagulation Dosing History:  Anticoagulation Dose History     Recent Dosing and Labs Latest Ref Rng & Units 1/12/2015 10/7/2016 3/21/2018 3/22/2018 4/26/2018 4/29/2018 4/30/2018    INR 0.86 - 1.14 1.08 1.08 1.31(H) 1.34(H) 1.52(H) 1.21(H) 1.13        Most Recent 3 Troponin's:  Recent Labs   Lab Test  04/26/18   1940   TROPI  <0.015     Most Recent Cholesterol Panel:  Recent Labs   Lab Test  06/12/12   0530   CHOL  198   LDL  81   HDL  95   TRIG  106     Most Recent 6 Bacteria Isolates From Any Culture (See EPIC Reports for Culture Details):  Recent Labs   Lab Test  05/02/18   1535  05/02/18   1530  10/08/16   1150   CULT  No growth after 2 days  No growth after 2 days  Moderate growth Normal trevin     Most Recent TSH, T4 and A1c Labs:  Recent Labs   Lab Test  03/22/18   0430  02/08/17   1640   TSH   --   1.51   A1C  Canceled, Test credited   --       Results for orders placed or performed during the hospital encounter of 04/26/18   MR Brain w/o & w Contrast    Narrative    MRI OF THE BRAIN WITHOUT AND WITH CONTRAST 4/28/2018 11:30 PM     COMPARISON: Head CT 4/21/2018.    HISTORY:  Patient with continuous positional headache over the left  neck/occiput following head trauma. Evaluate for CSF leak, cervical  radiculopathy, vertebral dissection.      TECHNIQUE: Axial diffusion-weighted with ADC map, axial T2-weighted  with fat saturation, axial T1-weighted, axial turboFLAIR and coronal  T1-weighted images of the brain were acquired without intravenous  contrast.  Following intravenous administration of gadolinium (15 mL  Gadavist), axial T1-weighted images of the brain were acquired.     FINDINGS: Again noted is a thin subdural collection along the  posterior aspect of the right cerebral convexity measuring up to 0.8  cm in thickness. This likely represents a chronic subdural fluid  collection such as a chronic subdural hematoma or chronic subdural  hygroma. There is mild diffuse cerebral volume loss. There are a few  tiny scattered focal areas of abnormal T2 signal hyperintensity in the  cerebral white matter bilaterally that are consistent with sequela of  chronic small vessel ischemic disease.    The ventricles and basal cisterns are within normal limits in  configuration given the degree of cerebral volume loss. There is no  midline shift. There is no evidence for stroke or acute intracranial  hemorrhage.      There is mild thickening and enhancement of the dura over the  posterior aspect of the right cerebral convexity likely related to the  subdural fluid collection. There is no other abnormal contrast  enhancement in the brain or its coverings.    There is no sinusitis or mastoiditis.      Impression    IMPRESSION:   1. Stable thin subdural collection along the posterior aspect of the  right cerebral convexity measuring up to 0.8 cm in  thickness.  Differential diagnosis remains chronic subdural hematoma or chronic  subdural hygroma.  2. Diffuse cerebral volume loss and cerebral white matter changes  consistent with chronic small vessel ischemic disease.    MARLO SANDERS MD   MR Cervical Spine w/o & w Contrast    Narrative    MRI OF THE CERVICAL SPINE WITHOUT AND WITH CONTRAST 4/28/2018 11:29 PM      COMPARISON: None.    HISTORY: Patient with continuous positional headache over the left  neck/occiput following head trauma. Evaluate for CSF leak, cervical  radiculopathy, vertebral dissection.    TECHNIQUE: Multiplanar, multisequence MRI images of the cervical spine  were acquired without and with 15 mL Gadavist gadolinium IV contrast.     FINDINGS: This study is limited by patient motion.    There is normal alignment of the cervical vertebrae; however, there is  straightening of normal cervical lordosis. Vertebral body heights of  the cervical spine are normal. Marrow signal throughout the cervical  vertebrae is within normal limits. There is no evidence for fracture  or pathologic bony lesion of the cervical spine. There is no abnormal  contrast enhancement in the cervical vertebrae.    There is loss of disc height, disc desiccation and posterior disc  bulging to varying degrees at all levels of the cervical spine with  exception of the normal appearing C7-T1 disc. There is no abnormal  contrast enhancement in the intervertebral discs of the cervical  spine.    The cervical spinal cord is mildly deformed by degenerative changes at  the C3-C4 level. The cervical spinal cord is otherwise normal in  contour. There is no abnormal signal in the cervical spinal cord.  There is no evidence for intrathecal abnormality. There is no abnormal  intrathecal contrast enhancement.    Level by level:    C2-C3: There is facet arthropathy bilaterally, uncinate hypertrophy  bilaterally and a posterior broad-based disc-osteophyte complex. These  findings result in  mild-moderate left foraminal narrowing and mild  right foraminal narrowing, but no spinal canal stenosis.    C3-C4: There is facet arthropathy bilaterally, uncinate hypertrophy  bilaterally and a posterior broad-based disc-osteophyte complex with a  superimposed small posterior broad-based disc herniation (protrusion).  These findings result in moderate spinal canal stenosis,  moderate-severe left foraminal stenosis and mild right foraminal  narrowing.    C4-C5: There is facet arthropathy bilaterally, uncinate hypertrophy  bilaterally and a posterior broad-based disc-osteophyte complex. These  findings result in mild-moderate bilateral neural foraminal narrowing,  but no spinal canal stenosis.    C5-C6: There is facet arthropathy bilaterally, uncinate hypertrophy  bilaterally and a posterior broad-based disc-osteophyte complex. These  findings result in mild-moderate bilateral neural foraminal narrowing,  but no spinal canal stenosis.    C6-C7: There is facet arthropathy bilaterally, uncinate hypertrophy  bilaterally and a posterior broad-based disc-osteophyte complex. These  findings result in mild-moderate foraminal narrowing, but no spinal  canal or left foraminal narrowing.    C7-T1: There is no spinal canal or neural foraminal stenosis at this  level.      Impression    IMPRESSION: Degenerative changes of the cervical spine as described  above.    MARLO SANDERS MD   MR Neck w/o & w Contrast Angiogram    Narrative    MRA NECK WITHOUT AND WITH CONTRAST  4/28/2018 11:14 PM     COMPARISON: None.    HISTORY: Patient with continuous positional headache over the left  neck/occiput following head trauma. Evaluate for CSF leak, cervical  radiculopathy, vertebral dissection.    TECHNIQUE: 2D time-of-flight MR angiogram of the neck without contrast  and 3D MR angiogram of the neck with 15 mL Gadavist gadolinium IV  contrast. MIP reconstruction of all MR angiographic data was  performed. Estimates of carotid stenoses are  made relative to the  distal internal carotid artery diameters except as noted.    FINDINGS:    Carotids: The common carotid arteries bilaterally are widely patent.  The cervical internal carotid arteries bilaterally are patent without  stenosis.    Vertebrals: The vertebral arteries bilaterally are patent without  stenosis and demonstrate antegrade flow.    Aortic arch: The arteries as they arise from the aortic arch are  normally arranged with no evidence for stenosis.      Impression    IMPRESSION: Normal MR angiogram of the neck.    MARLO SANDERS MD   XR Lumbar Puncture Spinal Tap Diag    Narrative    EXAM: XR LUMBAR PUNCTURE SPINAL TAP DIAGNOSTIC  5/1/2018 12:46 PM       History:  Postural headache, Cisternogram for cerebral spinal fluid  Leak; .     PROCEDURE: The patient consented for a lumbar puncture. The benefits  and risks of the procedure were explained to the patient, including  but not limited to worsening headache, hemorrhage, infection, lower  extremity pain, or nerve root injury. The patient was sterilely  prepped and draped with the patient in the prone position, over the  lower back. Under fluoroscopic guidance, the interlaminar spaces were  noted. 1% lidocaine was administered for local anesthetic over the  L2-3 interlaminar space, and a 22 gauge needle was advanced into the  thecal sac under fluoroscopic guidance. Return of CSF was slow with  some blood-tinged component. The needle tip was repositioned towards  the L5 vertebra. There was initial aspiration of clear CSF. 800 uCi of  indium 111 DTPA was instilled intrathecally.  The needle was removed.  There were no immediate complications associated with the procedure.        Fluoro time: 0.9 minutes.   Number of Images: 2      Impression    IMPRESSION: Successful lumbar puncture with intrathecal injection of  radiotracer for nuclear medicine cisternogram.    MEL BILLS PA-C   US Paracentesis    Narrative    ULTRASOUND PARACENTESIS April  30, 2018 2:40 PM     HISTORY: High volume paracentesis with or without diagnostic fluid  analysis with labs to be drawn if ordered. Total paracentesis volume  as much as possible.     FINDINGS: Ultrasound was used to evaluate for the presence and best  approach for paracentesis. Written and oral informed consent was  obtained. A pause for the cause procedure to verify the correct  patient and correct procedure. The skin overlying the right lower  quadrant was prepped and draped in the usual sterile fashion. The  subcutaneous tissues were anesthetized with 10 mL 1% lidocaine. A  catheter was advanced into the peritoneal space and 0.5 L of  straw  colored fluid was drained. There were no immediate complications.  Ultrasound images were permanently stored. Patient left the ultrasound  suite in satisfactory condition.      Impression    IMPRESSION: Technically successful paracentesis without immediate  complications.    TACO LINDA MD   NM CSF Flow Cisternogram    Narrative    NUCLEAR MEDICINE CSF FLOW CISTERNOGRAM  5/3/2018 1:46 PM     COMPARISON: None.    HISTORY: New onset positional headache after suffering fall. New right  frontal subdural hygroma. Pain over left occiput/neck. Concern for CSF  leak.     TECHNIQUE: Planar imaging of the head and spine were acquired after  the intrathecal injection (report under separate cover) of 800 uCi of  indium-111 labeled DTPA. Images were acquired at 15 minutes, 4 hours,  24 hours and 48 hours after injection.    FINDINGS: The 4-hour and 24-hour images demonstrate normal  distribution of activity throughout the spine and cranial vault. There  is no evidence for extravasation of activity from the dural space.      Impression    IMPRESSION: No findings to suggest CSF leak.    MARLO SANDERS MD   US Abdomen Complete    Narrative    ULTRASOUND ABDOMEN COMPLETE  5/3/2018 7:49 AM    HISTORY: Elevated alkaline phosphatase with fever.     COMPARISON: 10/7/2016    FINDINGS: The liver  parenchyma echogenicity is diffusely increased,  suggesting hepatic steatosis. The liver also has a coarsened  echotexture with a nodular contour, suggesting cirrhosis. The liver  measures 17.5 cm. Sludge is seen within the gallbladder lumen. There  is a small amount of fluid near the gallbladder, differential includes  ascites versus small amount of pericholecystic fluid. Technologist  reports a negative sonographic Head sign. The gallbladder wall is at  the upper limits of normal measuring 3 mm. No intra- or extrahepatic  bile duct dilatation. Pancreas is partially obscured by overlying  bowel gas, but appears unremarkable where seen. The spleen is enlarged  measuring 15.8 cm. There is a simple cyst in the superior pole of the  left kidney measuring 1.4 cm. Abdominal aorta and IVC are segmentally  seen, and are of normal caliber where visualized. Trace of ascites  throughout the abdomen.      Impression    IMPRESSION:   1. Cirrhotic-appearing liver with signs of portal hypertension  including trace ascites.  2. Small amount of fluid near the gallbladder. Differential includes  pericholecystic fluid or ascites.  3. Gallbladder wall is at the upper limits of normal. Gallbladder wall  thickening is a nonspecific finding and can be seen in patients with  intrinsic hepatic disease. If there are strong clinical concern for  acute cholecystitis consider HIDA scan.  4. Splenomegaly.     DO BREE ANAYA HepatOBiliary Scan    Narrative    NUCLEAR MEDICINE HEPATOBILIARY SCAN 5/4/2018 10:54 AM     HISTORY: Abnormal USG abdomen.     COMPARISON: None.    TECHNIQUE: The patient received Tc-99m mebrofenin intravenously.     DOSE: 7 mCi Tc99m Mebrofenin IV at 0650.    FINDINGS: There is prompt clearance of the radionuclide from the blood  pool into the liver. There is mottled irregular uptake of tracer into  the liver parenchyma with markedly delayed excretion into the bile  ducts. On a 4-hour delayed film, the  gallbladder fills, excluding  cholecystitis. Radiotracer passes into the duodenum normally on the  delayed films.      Impression    IMPRESSION: Marked hepatocellular disease, compatible with cirrhosis.  There is filling of the gallbladder, excluding cholecystitis.    ASHLEY CHEN MD

## 2018-05-04 NOTE — PROGRESS NOTES
Met with patient re: chemical dependency information. The pt has medicare. Gave pt a hand out informing pt to contact the [employer] requesting a Rule 25. Pt states he is very familiar with this process and is appreciative of the help and information.

## 2018-05-08 LAB
BACTERIA SPEC CULT: NO GROWTH
BACTERIA SPEC CULT: NO GROWTH
Lab: NORMAL
Lab: NORMAL
SPECIMEN SOURCE: NORMAL
SPECIMEN SOURCE: NORMAL

## 2018-05-21 ENCOUNTER — HOSPITAL ENCOUNTER (EMERGENCY)
Facility: CLINIC | Age: 63
Discharge: HOME | End: 2018-05-22
Attending: EMERGENCY MEDICINE | Admitting: EMERGENCY MEDICINE
Payer: MEDICARE

## 2018-05-21 DIAGNOSIS — F10.929 ALCOHOLIC INTOXICATION WITH COMPLICATION (H): ICD-10-CM

## 2018-05-21 DIAGNOSIS — R45.851 SUICIDAL IDEATION: ICD-10-CM

## 2018-05-21 LAB
ALBUMIN SERPL-MCNC: 3.2 G/DL (ref 3.4–5)
ALP SERPL-CCNC: 166 U/L (ref 40–150)
ALT SERPL W P-5'-P-CCNC: 23 U/L (ref 0–70)
ANION GAP SERPL CALCULATED.3IONS-SCNC: 7 MMOL/L (ref 3–14)
APAP SERPL-MCNC: <2 MG/L (ref 10–20)
AST SERPL W P-5'-P-CCNC: 42 U/L (ref 0–45)
BASOPHILS # BLD AUTO: 0.1 10E9/L (ref 0–0.2)
BASOPHILS NFR BLD AUTO: 1 %
BILIRUB SERPL-MCNC: 0.3 MG/DL (ref 0.2–1.3)
BUN SERPL-MCNC: 15 MG/DL (ref 7–30)
CALCIUM SERPL-MCNC: 8 MG/DL (ref 8.5–10.1)
CHLORIDE SERPL-SCNC: 107 MMOL/L (ref 94–109)
CO2 SERPL-SCNC: 27 MMOL/L (ref 20–32)
CREAT SERPL-MCNC: 0.8 MG/DL (ref 0.66–1.25)
DIFFERENTIAL METHOD BLD: ABNORMAL
EOSINOPHIL # BLD AUTO: 0.1 10E9/L (ref 0–0.7)
EOSINOPHIL NFR BLD AUTO: 1.3 %
ERYTHROCYTE [DISTWIDTH] IN BLOOD BY AUTOMATED COUNT: 18.3 % (ref 10–15)
ETHANOL SERPL-MCNC: 0.35 G/DL
GFR SERPL CREATININE-BSD FRML MDRD: >90 ML/MIN/1.7M2
GLUCOSE SERPL-MCNC: 93 MG/DL (ref 70–99)
HCT VFR BLD AUTO: 26.1 % (ref 40–53)
HGB BLD-MCNC: 8.2 G/DL (ref 13.3–17.7)
IMM GRANULOCYTES # BLD: 0 10E9/L (ref 0–0.4)
IMM GRANULOCYTES NFR BLD: 0.2 %
INR PPP: 1.08 (ref 0.86–1.14)
LYMPHOCYTES # BLD AUTO: 1.5 10E9/L (ref 0.8–5.3)
LYMPHOCYTES NFR BLD AUTO: 24.4 %
MCH RBC QN AUTO: 24.6 PG (ref 26.5–33)
MCHC RBC AUTO-ENTMCNC: 31.4 G/DL (ref 31.5–36.5)
MCV RBC AUTO: 78 FL (ref 78–100)
MONOCYTES # BLD AUTO: 0.5 10E9/L (ref 0–1.3)
MONOCYTES NFR BLD AUTO: 8.4 %
NEUTROPHILS # BLD AUTO: 3.9 10E9/L (ref 1.6–8.3)
NEUTROPHILS NFR BLD AUTO: 64.7 %
NRBC # BLD AUTO: 0 10*3/UL
NRBC BLD AUTO-RTO: 0 /100
PLATELET # BLD AUTO: 224 10E9/L (ref 150–450)
POTASSIUM SERPL-SCNC: 3.6 MMOL/L (ref 3.4–5.3)
PROT SERPL-MCNC: 7.1 G/DL (ref 6.8–8.8)
RBC # BLD AUTO: 3.33 10E12/L (ref 4.4–5.9)
SALICYLATES SERPL-MCNC: 3 MG/DL
SODIUM SERPL-SCNC: 141 MMOL/L (ref 133–144)
WBC # BLD AUTO: 6.1 10E9/L (ref 4–11)

## 2018-05-21 PROCEDURE — 80053 COMPREHEN METABOLIC PANEL: CPT | Performed by: EMERGENCY MEDICINE

## 2018-05-21 PROCEDURE — 25000132 ZZH RX MED GY IP 250 OP 250 PS 637: Mod: GY | Performed by: EMERGENCY MEDICINE

## 2018-05-21 PROCEDURE — 80329 ANALGESICS NON-OPIOID 1 OR 2: CPT | Performed by: EMERGENCY MEDICINE

## 2018-05-21 PROCEDURE — 25000128 H RX IP 250 OP 636: Performed by: EMERGENCY MEDICINE

## 2018-05-21 PROCEDURE — 85025 COMPLETE CBC W/AUTO DIFF WBC: CPT | Performed by: EMERGENCY MEDICINE

## 2018-05-21 PROCEDURE — 85610 PROTHROMBIN TIME: CPT | Performed by: EMERGENCY MEDICINE

## 2018-05-21 PROCEDURE — A9270 NON-COVERED ITEM OR SERVICE: HCPCS | Mod: GY | Performed by: EMERGENCY MEDICINE

## 2018-05-21 PROCEDURE — 99285 EMERGENCY DEPT VISIT HI MDM: CPT | Mod: 25

## 2018-05-21 PROCEDURE — 96374 THER/PROPH/DIAG INJ IV PUSH: CPT

## 2018-05-21 PROCEDURE — 80320 DRUG SCREEN QUANTALCOHOLS: CPT | Performed by: EMERGENCY MEDICINE

## 2018-05-21 RX ORDER — ONDANSETRON 2 MG/ML
4 INJECTION INTRAMUSCULAR; INTRAVENOUS
Status: COMPLETED | OUTPATIENT
Start: 2018-05-21 | End: 2018-05-21

## 2018-05-21 RX ORDER — DIAZEPAM 5 MG
5 TABLET ORAL ONCE
Status: COMPLETED | OUTPATIENT
Start: 2018-05-21 | End: 2018-05-21

## 2018-05-21 RX ADMIN — ONDANSETRON 4 MG: 2 INJECTION INTRAMUSCULAR; INTRAVENOUS at 22:49

## 2018-05-21 RX ADMIN — DIAZEPAM 5 MG: 5 TABLET ORAL at 23:58

## 2018-05-21 ASSESSMENT — ENCOUNTER SYMPTOMS
NAUSEA: 1
FEVER: 0
VOMITING: 1
ABDOMINAL PAIN: 0

## 2018-05-21 NOTE — ED AVS SNAPSHOT
Emergency Department    [address] [facility] [address]    Phone:  [phone]    Fax:  [phone]                                       Jim Richard   MRN: [id number]    Department:   Emergency Department   Date of Visit:  5/21/2018           Patient Information     Date Of Birth          1955        Your diagnoses for this visit were:     Suicidal ideation     Alcoholic intoxication with complication (H)        You were seen by Dez Coy MD and Arden Milan MD.      Follow-up Information     Follow up with Talon Elias MD In 1 day.    Specialty:  Family Practice    Contact information:    MARY CAMPBELL  [address] [facility]  [place] [address]  [phone]          Discharge Instructions         Alcohol Intoxication  Alcohol intoxication occurs when you drink alcohol faster than your liver can remove it from your system. The following facts are important to remember:    It can take 10 minutes or more to start to feel the effects of a drink, so you can easily get more intoxicated than you intended.    One drink may be more than 1 serving of alcohol. Depending on the drink, it can be 2 to 4 servings.    It takes about an hour for your body to metabolize (clear) 1 serving. If you have more than 1 drink, it can take a couple of hours or more.    Many things affect how drinks will affect you, including whether you ve eaten, how fast you drink, your size, how much you normally drink (or not), medicines you take, chronic diseases you have, and gender.  Signs and symptoms of alcohol poisoning  The following are signs and symptoms of alcohol poisoning:  Mild impairment    Reduced inhibitions    Slurred speech    Drowsiness    Decreased fine motor skills  Moderate impairment    Erratic behavior, aggression, depression    Impaired judgment    Confusion    Concentration difficulties    Coordination problems  Severe  "impairment    Vomiting    Seizures    Unconsciousness    Cold, clammy    Slow or irregular breathing    Hypothermia (low body temperature)    Coma  Health effects  Alcohol abuse causes health problems. Sometimes this can happen after only drinking a  little.\" There is no set number of drinks or amount of alcohol that defines too much. The more you drink at one time, and the more frequently you drink determine both the short-term and long-term health effects. It affects all parts of your body and your health, including your:    Brain. Alcohol is a central nervous system depressant. It can damage parts of the brain that affect your balance, memory, thinking, and emotions. It can cause memory loss, blackouts, depression, agitation, sleep cycle changes, and seizures. These changes may or may not be reversible.    Heart and vascular system. Alcohol affects multiple areas. It can damage heart muscle causing cardiomyopathy, which is a weakening and stretching of the heart muscle. This can lead to trouble breathing, an irregular heartbeat, atrial fibrillation, leg swelling, and heart failure. It makes the blood vessels stiffen causing hypertension (high blood pressure). All of these problems increase your risk of having heart attacks or strokes.    Liver. Alcohol causes fat to build up in the liver, affecting its normal function. This increases the risk for hepatitis, leading to abdominal pain, appetite loss, jaundice, bleeding problems, liver fibrosis, and cirrhosis. This in turn can affect your ability to fight off infections, and can cause diabetes. The liver changes prevent it from removing toxins in your blood that can cause encephalopathy. Signs of this are confusion, altered level of consciousness, personality changes, memory loss, seizures, coma, and death.    Pancreas. Alcohol can cause inflammation of the pancreas, or pancreatitis. This can cause pain in your abdomen, fever, and diabetes.    Immune system. Alcohol " weakens your immune system in a number of ways. It suppresses your immune system making it harder to fight off infections and colds. You will also have a higher risk of certain infections like pneumonia and tuberculosis.    Cancer risk. Alcohol raises your risk of cancer of the mouth, esophagus, pharynx, larynx, liver, and breast.    Sexual function. Alcohol abuse can also lead to sexual problems.  Alcohol use during pregnancy may cause permanent damage to the growing baby.  Home care  The following guidelines will help you care for yourself at home:    Don't drink any more alcohol.    Don't drive until all effects of the alcohol have worn off.    Don't operate machinery that can cause injuries.    Get lots of rest over the next few days. Drink plenty of water and other non-alcoholic liquids. Try to eat regular meals.    If you have been drinking heavily on a daily basis, you may go through alcohol withdrawal. The usual symptoms last 3 to 4 days and may include nervousness, shakiness, nausea, sweating, sleeplessness, and can even cause seizures and a serious withdrawal symptom called delirium tremens, or DTs. During this time, it is best that you stay with family or friends who can help and support you. You can also admit yourself to a residential detox program. If your symptoms are severe (seizures, severe shakiness, confusion), contact your doctor or call an ambulance for help (see below).   Follow-up care  If alcohol is a problem in your life, these are some organizations that can help you:    Alcoholics Anonymous offers support through a self-help fellowship. There are no dues or fees. See the Yellow Pages and call for time and place of meetings. Find AA online at www.aa.org.    [employer] offers support to families of alcohol users. Contact 106-322-9334, or online at www.al-anoselvin.org.    National [place] on Alcoholism and Drug Dependence can be reached at 002-306-0735, or online at www.ncadd.org.    There are also  inpatient and residential alcohol detox programs. Check the Internet or phonebook Yellow Pages under  Drug Abuse and Treatment Centers.   Call 911  Call 911 if any of these occur:    Trouble breathing or slow irregular breathing    Chest pain    Sudden weakness on one side of your body or sudden trouble speaking    Heavy bleeding or vomiting blood    Very drowsy or trouble awakening    Fainting or loss of consciousness    Rapid heart rate    Seizure  When to seek medical advice  Call your healthcare provider right away if any of these occur:    Severe shakiness     Fever of 100.4 F (38 C) or higher, or as directed by your healthcare provider    Confusion or hallucinations (seeing, hearing, or feeling things that are not there)    Pain in your upper abdomen that gets worse    Repeated vomiting  Date Last Reviewed: 6/1/2016 2000-2017 [employer]. [address] [place] [address], [place] [address]. All rights reserved. This information is not intended as a substitute for professional medical care. Always follow your healthcare professional's instructions.          Recognizing Suicide Warning Signs in Yourself    People who are thinking about suicide may not know they are depressed. Certain thoughts, feelings, and actions can be signals that let you know you may need help. The best thing you can do is watch for signs that you may be at risk. Then, ask for help. You can talk to your regular healthcare provider or seek help from a mental health provider.  Depression  Depression is a treatable illness. To know if depression is causing you to feel like ending your life, ask yourself:    Do I feel worthless, guilty, helpless, or hopeless?    Have I been feeling sad, down, or blue on most days?    Have I lost interest in my work or people I used to enjoy?    Do I have trouble sleeping or do I sleep too much?    Do I eat more or less than usual?    Do I feel tired, weak, and low on energy?    Do I feel restless and  "unable to sit still?    Do I have trouble thinking or making choices?    Do I cry more than usual?    Do I feel life isn't worth living?  Warning signs for suicide    Thinking often about taking your life    Planning how you may attempt it    Talking or writing about committing suicide    Feeling that death is the only solution to your problems    Feeling a pressing need to make out your will or arrange your     Giving away things you own    Participating in risky behaviors, such as sex with someone you don't know or drinking and driving    Buying a lethal weapon, such as a gun, or hoarding medicines that could be used in an over dose  If you notice any of these warning signs, call for help right away or go to your closest hospital emergency department. You can also call a mental health clinic or a 24-hour suicide crisis hotline for help and support. Search for local suicide prevention resources on your computer or look for the number in the white pages of your phone book under \"Suicide.\" In an emergency, if you are in immediate risk of harming yourself, call 911. For more information about depression:    National [place] of Mental Lyzmsd944-192-0058akp.[facility].nih.gov    National Suicide Prevention Ybwuqnjz144-727-7247 (067-273-XMES)www.suicidepreventionlifeline.org    National [place] on Mental Ckqqddu656-732-3753lwq.debra.org    Mental Health Memctof120-677-7449gjt.[facility].org    National Suicide Zklpulv157-812-2009 (800-SUICIDE)   Date Last Reviewed: 2017-2017 [employer]. [address] [place] [address]. All rights reserved. This information is not intended as a substitute for professional medical care. Always follow your healthcare professional's instructions.          Your next 10 appointments already scheduled     May 25, 2018 11:15 AM CDT   CT HEAD W/O CONTRAST with SHCT1   [facility] ([facility])    [address] [place] [address] " [id number] [phone]           Please bring any scans or X-rays taken at other hospitals, if similar tests were done. Also bring a list of your medicines, including vitamins, minerals and over-the-counter drugs. It is safest to leave personal items at home.  Be sure to tell your doctor:   If you have any allergies.   If there s any chance you are pregnant.   If you are breastfeeding.  You do not need to do anything special to prepare for this exam.  Please wear loose clothing, such as a sweat suit or jogging clothes. Avoid snaps, zippers and other metal. We may ask you to undress and put on a hospital gown.            May 30, 2018 11:00 AM CDT   Return Visit with Chayo Martinez PA-C   [facility] ([facility])    [address] [place] [id number] [phone]              24 Hour Appointment Hotline       To make an appointment at any [facility], call [phone] ([phone]). If you don't have a family doctor or clinic, we will help you find one. [facility] clinics are conveniently located to serve the needs of you and your family.             Review of your medicines      Our records show that you are taking the medicines listed below. If these are incorrect, please call your family doctor or clinic.        Dose / Directions Last dose taken    albuterol 108 (90 Base) MCG/ACT Inhaler   Commonly known as:  PROAIR HFA/PROVENTIL HFA/VENTOLIN HFA   Dose:  2 puff        Inhale 2 puffs into the lungs every 6 hours as needed   Refills:  0        ATORVASTATIN CALCIUM PO   Dose:  10 mg        Take 10 mg by mouth At Bedtime   Refills:  0        BREO ELLIPTA 200-25 MCG/INH oral inhaler   Dose:  1 puff   Generic drug:  fluticasone-vilanterol        Inhale 1 puff into the lungs daily as needed   Refills:  0        DULoxetine 60 MG EC capsule   Commonly known as:  CYMBALTA   Dose:  60 mg   Quantity:  30 capsule        Take 1 capsule (60 mg) by  mouth daily   Refills:  0        hydrOXYzine 25 MG tablet   Commonly known as:  ATARAX   Dose:  50 mg   Quantity:  30 tablet        Take 2 tablets (50 mg) by mouth nightly as needed (sleep)   Refills:  0        methocarbamol 750 MG tablet   Commonly known as:  ROBAXIN   Dose:  750 mg   Quantity:  15 tablet        Take 1 tablet (750 mg) by mouth 3 times daily as needed for muscle spasms   Refills:  0        MIRTAZAPINE PO   Dose:  30 mg        Take 30 mg by mouth At Bedtime   Refills:  0        omeprazole 40 MG capsule   Commonly known as:  priLOSEC   Dose:  40 mg   Quantity:  60 capsule        Take 1 capsule (40 mg) by mouth 2 times daily (before meals) Take 30-60 minutes before a meal.   Refills:  1        pregabalin 25 MG capsule   Commonly known as:  LYRICA   Dose:  25 mg   Quantity:  90 capsule        Take 1 capsule (25 mg) by mouth 3 times daily   Refills:  0        QUETIAPINE FUMARATE PO   Dose:  50 mg        Take 50 mg by mouth 3 times daily as needed   Refills:  0        spironolactone-hydrochlorothiazide 25-25 MG per tablet   Commonly known as:  ALDACTAZIDE   Dose:  1 tablet        Take 1 tablet by mouth daily   Refills:  0        TAMSULOSIN HCL PO   Dose:  0.4 mg        Take 0.4 mg by mouth daily   Refills:  0        TRAZODONE HCL PO   Dose:  100 mg        Take 100 mg by mouth nightly as needed (Takes 2 x 100mg for a total of 200mg at bedtime)   Refills:  0                Procedures and tests performed during your visit     Acetaminophen level    Alcohol ethyl    CBC with platelets differential    Comprehensive metabolic panel    INR    Salicylate level      Orders Needing Specimen Collection     None      Pending Results     No orders found for last 3 day(s).            Pending Culture Results     No orders found for last 3 day(s).            Pending Results Instructions     If you had any lab results that were not finalized at the time of your Discharge, you can call the ED Lab Result RN at [phone].  You will be contacted by this team for any positive Lab results or changes in treatment. The nurses are available 7 days a week from 10A to 6:30P.  You can leave a message 24 hours per day and they will return your call.        Test Results From Your Hospital Stay        5/21/2018 11:01 PM      Component Results     Component Value Ref Range & Units Status    WBC 6.1 4.0 - 11.0 10e9/L Final    RBC Count 3.33 (L) 4.4 - 5.9 10e12/L Final    Hemoglobin 8.2 (L) 13.3 - 17.7 g/dL Final    Hematocrit 26.1 (L) 40.0 - 53.0 % Final    MCV 78 78 - 100 fl Final    MCH 24.6 (L) 26.5 - 33.0 pg Final    MCHC 31.4 (L) 31.5 - 36.5 g/dL Final    RDW 18.3 (H) 10.0 - 15.0 % Final    Platelet Count 224 150 - 450 10e9/L Final    Diff Method Automated Method  Final    % Neutrophils 64.7 % Final    % Lymphocytes 24.4 % Final    % Monocytes 8.4 % Final    % Eosinophils 1.3 % Final    % Basophils 1.0 % Final    % Immature Granulocytes 0.2 % Final    Nucleated RBCs 0 0 /100 Final    Absolute Neutrophil 3.9 1.6 - 8.3 10e9/L Final    Absolute Lymphocytes 1.5 0.8 - 5.3 10e9/L Final    Absolute Monocytes 0.5 0.0 - 1.3 10e9/L Final    Absolute Eosinophils 0.1 0.0 - 0.7 10e9/L Final    Absolute Basophils 0.1 0.0 - 0.2 10e9/L Final    Abs Immature Granulocytes 0.0 0 - 0.4 10e9/L Final    Absolute Nucleated RBC 0.0  Final         5/21/2018 11:11 PM      Component Results     Component Value Ref Range & Units Status    INR 1.08 0.86 - 1.14 Final         5/21/2018 11:20 PM      Component Results     Component Value Ref Range & Units Status    Sodium 141 133 - 144 mmol/L Final    Potassium 3.6 3.4 - 5.3 mmol/L Final    Chloride 107 94 - 109 mmol/L Final    Carbon Dioxide 27 20 - 32 mmol/L Final    Anion Gap 7 3 - 14 mmol/L Final    Glucose 93 70 - 99 mg/dL Final    Urea Nitrogen 15 7 - 30 mg/dL Final    Creatinine 0.80 0.66 - 1.25 mg/dL Final    GFR Estimate >90 >60 mL/min/1.7m2 Final    Non  GFR Calc    GFR Estimate If Black >90 >60  mL/min/1.7m2 Final     GFR Calc    Calcium 8.0 (L) 8.5 - 10.1 mg/dL Final    Bilirubin Total 0.3 0.2 - 1.3 mg/dL Final    Albumin 3.2 (L) 3.4 - 5.0 g/dL Final    Protein Total 7.1 6.8 - 8.8 g/dL Final    Alkaline Phosphatase 166 (H) 40 - 150 U/L Final    ALT 23 0 - 70 U/L Final    AST 42 0 - 45 U/L Final         5/21/2018 11:27 PM      Component Results     Component Value Ref Range & Units Status    Ethanol g/dL 0.35 (HH) <0.01 g/dL Final    Critical Value called to and read back by   IN ER AT 2325 BY MV           5/21/2018 11:17 PM      Component Results     Component Value Ref Range & Units Status    Acetaminophen Level <2 mg/L Final    Therapeutic range: 10-20 mg/L         5/21/2018 11:17 PM      Component Results     Component Value Ref Range & Units Status    Salicylate Level 3 mg/dL Final    Therapeutic:        <20  Anti inflammatory:  15-30                  Clinical Quality Measure: Blood Pressure Screening     Your blood pressure was checked while you were in the emergency department today. The last reading we obtained was  BP: 120/64 . Please read the guidelines below about what these numbers mean and what you should do about them.  If your systolic blood pressure (the top number) is less than 120 and your diastolic blood pressure (the bottom number) is less than 80, then your blood pressure is normal. There is nothing more that you need to do about it.  If your systolic blood pressure (the top number) is 120-139 or your diastolic blood pressure (the bottom number) is 80-89, your blood pressure may be higher than it should be. You should have your blood pressure rechecked within a year by a primary care provider.  If your systolic blood pressure (the top number) is 140 or greater or your diastolic blood pressure (the bottom number) is 90 or greater, you may have high blood pressure. High blood pressure is treatable, but if left untreated over time it can put you at risk for heart  "attack, stroke, or kidney failure. You should have your blood pressure rechecked by a primary care provider within the next 4 weeks.  If your provider in the emergency department today gave you specific instructions to follow-up with your doctor or provider even sooner than that, you should follow that instruction and not wait for up to 4 weeks for your follow-up visit.        Thank you for choosing [facility]       Thank you for choosing [facility] for your care. Our goal is always to provide you with excellent care. Hearing back from our patients is one way we can continue to improve our services. Please take a few minutes to complete the written survey that you may receive in the mail after you visit with us. Thank you!        [facility] Information     [employer] lets you send messages to your doctor, view your test results, renew your prescriptions, schedule appointments and more. To sign up, go to [email or web address] . Click on \"Log in\" on the left side of the screen, which will take you to the Welcome page. Then click on \"Sign up Now\" on the right side of the page.     You will be asked to enter the access code listed below, as well as some personal information. Please follow the directions to create your username and password.     Your access code is: [email or web address]  Expires: 2018 10:53 AM     Your access code will  in 90 days. If you need help or a new code, please call your [facility] clinic or [phone].        Care EveryWhere ID     This is your Care EveryWhere ID. This could be used by other organizations to access your [facility] medical records  [id number]        Equal Access to Services     AGUSTÍN PLUNKETT : Hadii fito Escobar, waaxda luqadaha, qaybta kaalstan short. So [place] -[phone].    ATENCIÓN: Si habla español, tiene a thompson disposición servicios gratuitos de asistencia lingüística. Llame al [phone].    We comply with applicable " federal civil rights laws and [place] laws. We do not discriminate on the basis of race, color, national origin, age, disability, sex, sexual orientation, or gender identity.            After Visit Summary       This is your record. Keep this with you and show to your community pharmacist(s) and doctor(s) at your next visit.

## 2018-05-21 NOTE — ED AVS SNAPSHOT
Emergency Department    [address] [facility] [address]    Phone:  [phone]    Fax:  [phone]                                       Jim Richard   MRN: [id number]    Department:   Emergency Department   Date of Visit:  5/21/2018           After Visit Summary Signature Page     I have received my discharge instructions, and my questions have been answered. I have discussed any challenges I see with this plan with the nurse or doctor.    ..........................................................................................................................................  Patient/Patient Representative Signature      ..........................................................................................................................................  Patient Representative Print Name and Relationship to Patient    ..................................................               ................................................  Date                                            Time    ..........................................................................................................................................  Reviewed by Signature/Title    ...................................................              ..............................................  Date                                                            Time

## 2018-05-22 VITALS
BODY MASS INDEX: 30.53 KG/M2 | HEIGHT: 66 IN | SYSTOLIC BLOOD PRESSURE: 128 MMHG | HEART RATE: 74 BPM | RESPIRATION RATE: 20 BRPM | OXYGEN SATURATION: 97 % | WEIGHT: 190 LBS | DIASTOLIC BLOOD PRESSURE: 72 MMHG | TEMPERATURE: 98.2 F

## 2018-05-22 PROCEDURE — 90791 PSYCH DIAGNOSTIC EVALUATION: CPT

## 2018-05-22 NOTE — ED NOTES
"1 on 1 sitter initiated at this time due to patient's previously documented suicidal statements to EMS crew and to initial provider note.  This RN spoke to ED provider, Dr. Coy, regarding situation and documentation.  ED provider reports that due to patient's alcohol intoxication, provider is unable to de-escalate patient from 1:1 sitter.  Patient walking around in ED common area, approaches  nursing core multiple times expressing he is now ready to go home and states \"you guys aren't doing anything that I can't do at home, you won't give me my sleeping or pain meds for my back\" \"I'm suppose to sleep on this piece of shit bed, it's not my sleep number at home\"  Patient verbally educated that ED provider is aware of patient's concerns and requests.  "

## 2018-05-22 NOTE — ED PROVIDER NOTES
History     Chief Complaint:  Alcohol Intoxication    HPI   Jim Richard is a 63 year old male who presents with alcohol intoxication.  Patient is brought in via EMS after patient called police department for having suicidal ideation over the past 3 hours.  Patient also reports he has been drinking since 1700 today and his blood alcohol via breathalyzer was 0.29.  On presentation, patient stated he has wanted to hurt himself in the past prior to tonight and is on medication for anxiety, depression, and cardiac complications.  Patient probably lives in a town home in [place] with a couple of guys and has felt very depressed over the past few days.  He denies any other ingestions besides alcohol, but does report a recent fall with head trauma and hematemesis.  No other symptoms reported.    Allergies:  No known drug allergies.    Medications:    albuterol (PROAIR HFA/PROVENTIL HFA/VENTOLIN HFA) 108 (90 BASE) MCG/ACT Inhaler  ATORVASTATIN CALCIUM PO  DULoxetine (CYMBALTA) 60 MG EC capsule  hydrOXYzine (ATARAX) 25 MG tablet  methocarbamol (ROBAXIN) 750 MG tablet  MIRTAZAPINE PO  omeprazole (PRILOSEC) 40 MG capsule  pregabalin (LYRICA) 25 MG capsule  spironolactone-hydrochlorothiazide (ALDACTAZIDE) 25-25 MG per tablet  TAMSULOSIN HCL PO  TRAZODONE HCL PO     Past Medical History:    Alcoholism  Anxiety  Coronary artery disease  Depression  Hepatomegaly  Hyperlipidemia  Hypertension  Myocardial infarction  Peripheral vascular disease  Sleep apnea  Spinal stenosis    Past Surgical History:    Appendectomy  Femoropopliteal bypass graft  Colonoscopy  Esophagoscopy, Gastroscopy, Duodenoscopy, Combined  Endarterectomy  Hernia repair  Laminectomy  Tonsillectomy and Adenoidectomy     Family History:    Son-mental illness  Mother-coronary artery disease  Father-substance abuse, cancer  Brother-substance abuse    Social History:  Smoking status: Current smoker  Alcohol use: Yes  Marital Status:  Single      Review of  "Systems   Constitutional: Negative for fever.   Gastrointestinal: Positive for nausea and vomiting. Negative for abdominal pain.   Psychiatric/Behavioral: Positive for suicidal ideas. Negative for self-injury.   All other systems reviewed and are negative.    Physical Exam     Patient Vitals for the past 24 hrs:   BP Temp Temp src Heart Rate SpO2 Height Weight   05/21/18 2210 128/70 98.1  F (36.7  C) Oral 71 92 % 1.676 m (5' 6\") 86.2 kg (190 lb)         Physical Exam  Constitutional: The patient is oriented to person, place, and time. The patient appears well-developed and well-nourished.  Smells of alcohol  Hent: Dry mucous membranes. No evidence of hematemesis   Head: atraumatic.   Eyes: conjunctivae are normal. Pupils are equal, round, and reactive to light.   Neck: normal range of motion.   Cardiovascular: normal rate and regular rhythm.    Pulmonary/chest: effort normal and breath sounds normal. No respiratory distress.   Abdominal: soft. No tenderness. The patient has no guarding.   Musculoskeletal: normal range of motion. The patient exhibits no edema.   Neurological: The patient is alert and oriented to person, place, and time. GCS 15.  Skin: skin is warm and dry. No rash noted.   Psychiatric: The patient has a normal mood and affect. The patient's behavior is normal.    Emergency Department Course   Laboratory:  CBC:  WBC 6.1, HGB 8.2 (L), ,  CMP: Calcium 8.0 (L), Albumin 3.2 (L), ALKPHOS 166 (H) otherwise WNL (Creatinine 0.80)  (2247) INR: 1.08  (2247) BARNEY: 0.35 (HH)  (2247) Acetaminophen: <2  (2247) Salicylate: 3     Interventions:  (2249) Zofran, 4 mg, IV injection  (2358) Valium 5 mg, PO    Emergency Department Course:  Nursing notes and vitals reviewed.  (2220) I performed an exam of the patient as documented above.    Blood was drawn from the patient. This was sent for laboratory testing, findings above.     6:00 AM the patient was signed out to my colleague Dr. Cali Milan who will follow " up on DEC evaluation when the patient is sober and disposition based on their evaluation.   Impression & Plan    Medical Decision Making:  Jim Richard is a 63 year old male who presents for evaluation of alcohol abuse.  He is intoxicated here in ED by blood work.  Blood work otherwise looks ok; no signs of alcoholic ketoacidosis, significant liver impairment or acute alcoholic hepatitis. He has no history of DT's or alcohol withdrawal seizures. There are no signs of co-ingestion including acetaminophen, drugs, medications, volatile alcohols. He has no signs of trauma related to alcohol use and no further workup is needed including head CT. Patient signed out to oncoming physician pending sober DEC evaluation.      Diagnosis:    ICD-10-CM    1. Suicidal ideation R45.851    2. Alcoholic intoxication with complication (H) F10.929        Disposition:  Patient was signed out to oncoming physician.             IHugh, am serving as a scribe on 5/21/2018 at 10:20  PM to personally document services performed by Dr. Coy based on my observations and the provider's statements to me.        Hugh Oneill  5/21/2018    EMERGENCY DEPARTMENT       Dez Coy MD  05/22/18 0609

## 2018-05-22 NOTE — ED NOTES
Patient laying in bed, concerns voiced that he hasn't seen a doctor and no one is doing anything about why his stomach hurts.  RN educated patient that typically GI upsets and chronic drinking are correlated, patient was advised to stop drinking as this was the probable cause of GI upset.  Patient also made aware that labs were checked and reviewed by the physician as well as he was seen by an ED provider upon ED arrival.  Patient verbalized understanding.

## 2018-05-22 NOTE — ED NOTES
Bed: [facility]  Expected date:   Expected time:   Means of arrival:   Comments:  [id number] 64m SI. Coughing blood earlier. ETA 2142.

## 2018-05-22 NOTE — ED NOTES
"ED provider denied that patient should be administered his nighttime oral medications due to vomiting blood.  Patient educated regarding ED provider's response; patient then responded that the vomiting is done stating \"It was just a little bit, not has much as in the past.\"  RN will re-address with ED provider.  "

## 2018-05-22 NOTE — ED PROVIDER NOTES
This is a sign out note taken from Dr. Coy for Jim Richard. Patient is a 64 yo male who presents to the ED with alcohol intoxication as well as suicidality. The patient is now sober. He has spoken with our mental health professionals and does state he feels comfortable with going home. With this in mind, I see no reason to initiate a 72 hour hold, as on my bedside evaluation the patient is calm, cooperative, alert and oriented, able to take care of his ADLs and is able to contract for safety. Likely was related to intoxication. No evidence of withdrawal. Will discharge as above in accordance with patient's wishes, as he is now clinically sober.    I, Adriana Holt, am serving as a scribe at 0915 on May 22, 2018 to document services personally performed by  Dr. Milan, based on my observations and the provider's statements to me.         Arden Milan MD  05/22/18 5107

## 2018-05-22 NOTE — ED NOTES
"Patient approached  nursing core stating \"If I'm not getting out of here, I need my bedtime sleeping meds, you have my meds give them to me\"  Patient educated that we are unable to discharge him due to suicidal statements made.  Patient shakes his head, turns and enters back into  room at this time.  "

## 2018-05-22 NOTE — ED NOTES
Patient currently sitting in  common area, appears calm.  RN spoke to patient regarding GI upset, describes as a nausea feeling and associates with alcohol intake.  Patient also expresses concern regarding night time medications.  RN will discuss patient's concerns with ED provider.  Patient educated on using emesis bag for better assessment of emesis and to perform occult study.

## 2018-05-22 NOTE — ED NOTES
Pt was asked to vomit into the emesis bag so we can measure the vomit and see the content; Pt has came to the window many times asking about his stomach, MD aware

## 2018-05-22 NOTE — DISCHARGE INSTRUCTIONS
"  Alcohol Intoxication  Alcohol intoxication occurs when you drink alcohol faster than your liver can remove it from your system. The following facts are important to remember:    It can take 10 minutes or more to start to feel the effects of a drink, so you can easily get more intoxicated than you intended.    One drink may be more than 1 serving of alcohol. Depending on the drink, it can be 2 to 4 servings.    It takes about an hour for your body to metabolize (clear) 1 serving. If you have more than 1 drink, it can take a couple of hours or more.    Many things affect how drinks will affect you, including whether you ve eaten, how fast you drink, your size, how much you normally drink (or not), medicines you take, chronic diseases you have, and gender.  Signs and symptoms of alcohol poisoning  The following are signs and symptoms of alcohol poisoning:  Mild impairment    Reduced inhibitions    Slurred speech    Drowsiness    Decreased fine motor skills  Moderate impairment    Erratic behavior, aggression, depression    Impaired judgment    Confusion    Concentration difficulties    Coordination problems  Severe impairment    Vomiting    Seizures    Unconsciousness    Cold, clammy    Slow or irregular breathing    Hypothermia (low body temperature)    Coma  Health effects  Alcohol abuse causes health problems. Sometimes this can happen after only drinking a  little.\" There is no set number of drinks or amount of alcohol that defines too much. The more you drink at one time, and the more frequently you drink determine both the short-term and long-term health effects. It affects all parts of your body and your health, including your:    Brain. Alcohol is a central nervous system depressant. It can damage parts of the brain that affect your balance, memory, thinking, and emotions. It can cause memory loss, blackouts, depression, agitation, sleep cycle changes, and seizures. These changes may or may not be " reversible.    Heart and vascular system. Alcohol affects multiple areas. It can damage heart muscle causing cardiomyopathy, which is a weakening and stretching of the heart muscle. This can lead to trouble breathing, an irregular heartbeat, atrial fibrillation, leg swelling, and heart failure. It makes the blood vessels stiffen causing hypertension (high blood pressure). All of these problems increase your risk of having heart attacks or strokes.    Liver. Alcohol causes fat to build up in the liver, affecting its normal function. This increases the risk for hepatitis, leading to abdominal pain, appetite loss, jaundice, bleeding problems, liver fibrosis, and cirrhosis. This in turn can affect your ability to fight off infections, and can cause diabetes. The liver changes prevent it from removing toxins in your blood that can cause encephalopathy. Signs of this are confusion, altered level of consciousness, personality changes, memory loss, seizures, coma, and death.    Pancreas. Alcohol can cause inflammation of the pancreas, or pancreatitis. This can cause pain in your abdomen, fever, and diabetes.    Immune system. Alcohol weakens your immune system in a number of ways. It suppresses your immune system making it harder to fight off infections and colds. You will also have a higher risk of certain infections like pneumonia and tuberculosis.    Cancer risk. Alcohol raises your risk of cancer of the mouth, esophagus, pharynx, larynx, liver, and breast.    Sexual function. Alcohol abuse can also lead to sexual problems.  Alcohol use during pregnancy may cause permanent damage to the growing baby.  Home care  The following guidelines will help you care for yourself at home:    Don't drink any more alcohol.    Don't drive until all effects of the alcohol have worn off.    Don't operate machinery that can cause injuries.    Get lots of rest over the next few days. Drink plenty of water and other non-alcoholic liquids.  Try to eat regular meals.    If you have been drinking heavily on a daily basis, you may go through alcohol withdrawal. The usual symptoms last 3 to 4 days and may include nervousness, shakiness, nausea, sweating, sleeplessness, and can even cause seizures and a serious withdrawal symptom called delirium tremens, or DTs. During this time, it is best that you stay with family or friends who can help and support you. You can also admit yourself to a residential detox program. If your symptoms are severe (seizures, severe shakiness, confusion), contact your doctor or call an ambulance for help (see below).   Follow-up care  If alcohol is a problem in your life, these are some organizations that can help you:    Alcoholics Anonymous offers support through a self-help fellowship. There are no dues or fees. See the Yellow Pages and call for time and place of meetings. Find AA online at www.aa.org.    [employer] offers support to families of alcohol users. Contact 974-193-9155, or online at www.al-anoselvin.org.    [employer] [place] on Alcoholism and Drug Dependence can be reached at 386-682-0894, or online at www.ncadd.org.    There are also inpatient and residential alcohol detox programs. Check the Internet or phonebook Yellow Pages under  Drug Abuse and Treatment Centers.   Call 911  Call 911 if any of these occur:    Trouble breathing or slow irregular breathing    Chest pain    Sudden weakness on one side of your body or sudden trouble speaking    Heavy bleeding or vomiting blood    Very drowsy or trouble awakening    Fainting or loss of consciousness    Rapid heart rate    Seizure  When to seek medical advice  Call your healthcare provider right away if any of these occur:    Severe shakiness     Fever of 100.4 F (38 C) or higher, or as directed by your healthcare provider    Confusion or hallucinations (seeing, hearing, or feeling things that are not there)    Pain in your upper abdomen that gets worse    Repeated  vomiting  Date Last Reviewed: 2016-2017 [employer]. [address], [place] [address]. All rights reserved. This information is not intended as a substitute for professional medical care. Always follow your healthcare professional's instructions.          Recognizing Suicide Warning Signs in Yourself    People who are thinking about suicide may not know they are depressed. Certain thoughts, feelings, and actions can be signals that let you know you may need help. The best thing you can do is watch for signs that you may be at risk. Then, ask for help. You can talk to your regular healthcare provider or seek help from a mental health provider.  Depression  Depression is a treatable illness. To know if depression is causing you to feel like ending your life, ask yourself:    Do I feel worthless, guilty, helpless, or hopeless?    Have I been feeling sad, down, or blue on most days?    Have I lost interest in my work or people I used to enjoy?    Do I have trouble sleeping or do I sleep too much?    Do I eat more or less than usual?    Do I feel tired, weak, and low on energy?    Do I feel restless and unable to sit still?    Do I have trouble thinking or making choices?    Do I cry more than usual?    Do I feel life isn't worth living?  Warning signs for suicide    Thinking often about taking your life    Planning how you may attempt it    Talking or writing about committing suicide    Feeling that death is the only solution to your problems    Feeling a pressing need to make out your will or arrange your     Giving away things you own    Participating in risky behaviors, such as sex with someone you don't know or drinking and driving    Buying a lethal weapon, such as a gun, or hoarding medicines that could be used in an over dose  If you notice any of these warning signs, call for help right away or go to your closest hospital emergency department. You can also call a mental  "health clinic or a 24-hour suicide crisis hotline for help and support. Search for local suicide prevention resources on your computer or look for the number in the white pages of your phone book under \"Suicide.\" In an emergency, if you are in immediate risk of harming yourself, call 911. For more information about depression:    National [place] of Mental Diaqds916-314-5984dcn.[facility].nih.gov    National Suicide Prevention Yjcpxptg786-572-4823 (529-793-EGDD)www.suicidepreventionlifeline.org    National [place] on Mental Lgkllxi833-119-0220jum.debra.org    Mental Health Myoimkm288-714-2271wgd.[facility].org    National Suicide Xzytkeg108-129-4422 (800-SUICIDE)   Date Last Reviewed: 1/1/2017 2000-2017 [employer]. [address] [place] [address]. All rights reserved. This information is not intended as a substitute for professional medical care. Always follow your healthcare professional's instructions.        "

## 2018-05-22 NOTE — ED NOTES
Patient approaches ED nursing core requesting additional medication to help patient fall asleep, ED provider notified.

## 2018-05-22 NOTE — ED NOTES
"Report received.  This nurse assumes patient's care at this time.  Patient presented to  nursing core multiple times during nurse report/shift change; patient complains of GI upset and states \"Am I just suppose to throw up this blood just whereever\"  Patient redirected back to room; verbally educated that this RN will come in for assessment and speak to patient regarding his GI problems.  Reporting RN Mikey reported that patient had previously reported he threw up blood in toilet however RN did not observed this reported finding.  "

## 2018-05-22 NOTE — ED NOTES
Patient laying on ED cart on side, eyes closed.  Respirations even and unlabored.  Patient repositions independently.  Side rails in upright position.

## 2018-09-29 ENCOUNTER — APPOINTMENT (OUTPATIENT)
Dept: CT IMAGING | Facility: CLINIC | Age: 63
DRG: 377 | End: 2018-09-29
Attending: EMERGENCY MEDICINE
Payer: MEDICARE

## 2018-09-29 ENCOUNTER — SURGERY (OUTPATIENT)
Age: 63
End: 2018-09-29

## 2018-09-29 ENCOUNTER — APPOINTMENT (OUTPATIENT)
Dept: GENERAL RADIOLOGY | Facility: CLINIC | Age: 63
DRG: 377 | End: 2018-09-29
Attending: EMERGENCY MEDICINE
Payer: MEDICARE

## 2018-09-29 ENCOUNTER — HOSPITAL ENCOUNTER (INPATIENT)
Facility: CLINIC | Age: 63
LOS: 6 days | Discharge: NURSING, REHAB OR LONG-TERM CARE | DRG: 377 | End: 2018-10-05
Attending: EMERGENCY MEDICINE | Admitting: INTERNAL MEDICINE
Payer: MEDICARE

## 2018-09-29 DIAGNOSIS — I85.11 SECONDARY ESOPHAGEAL VARICES WITH BLEEDING (H): ICD-10-CM

## 2018-09-29 DIAGNOSIS — F10.920 ALCOHOLIC INTOXICATION WITHOUT COMPLICATION (H): ICD-10-CM

## 2018-09-29 DIAGNOSIS — S22.41XA CLOSED FRACTURE OF MULTIPLE RIBS OF RIGHT SIDE, INITIAL ENCOUNTER: ICD-10-CM

## 2018-09-29 DIAGNOSIS — K92.0 HEMATEMESIS WITH NAUSEA: ICD-10-CM
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
## 2018-09-29 DIAGNOSIS — K70.10 ALCOHOLIC HEPATITIS WITHOUT ASCITES (H): ICD-10-CM

## 2018-09-29 DIAGNOSIS — F41.9 ANXIETY: Primary | ICD-10-CM

## 2018-09-29 PROBLEM — D62 ACUTE BLOOD LOSS ANEMIA: Status: ACTIVE | Noted: 2018-09-29

## 2018-09-29 PROBLEM — G89.29 CHRONIC LOW BACK PAIN: Status: ACTIVE | Noted: 2018-09-29

## 2018-09-29 PROBLEM — F10.10 ALCOHOL ABUSE: Status: ACTIVE | Noted: 2018-09-29

## 2018-09-29 PROBLEM — M54.50 CHRONIC LOW BACK PAIN: Status: ACTIVE | Noted: 2018-09-29

## 2018-09-29 PROBLEM — I25.10 CAD (CORONARY ARTERY DISEASE): Status: ACTIVE | Noted: 2018-09-29

## 2018-09-29 PROBLEM — I25.10 CAD (CORONARY ARTERY DISEASE): Status: RESOLVED | Noted: 2018-09-29 | Resolved: 2018-09-29

## 2018-09-29 LAB
ABO + RH BLD: NORMAL
ABO + RH BLD: NORMAL
ALBUMIN SERPL-MCNC: 3.2 G/DL (ref 3.4–5)
ALP SERPL-CCNC: 332 U/L (ref 40–150)
ALT SERPL W P-5'-P-CCNC: 60 U/L (ref 0–70)
ANION GAP SERPL CALCULATED.3IONS-SCNC: 13 MMOL/L (ref 3–14)
AST SERPL W P-5'-P-CCNC: 140 U/L (ref 0–45)
BASOPHILS # BLD AUTO: 0.1 10E9/L (ref 0–0.2)
BASOPHILS NFR BLD AUTO: 1.8 %
BILIRUB SERPL-MCNC: 1 MG/DL (ref 0.2–1.3)
BLD GP AB SCN SERPL QL: NORMAL
BLOOD BANK CMNT PATIENT-IMP: NORMAL
BUN SERPL-MCNC: 16 MG/DL (ref 7–30)
CALCIUM SERPL-MCNC: 8.1 MG/DL (ref 8.5–10.1)
CHLORIDE SERPL-SCNC: 106 MMOL/L (ref 94–109)
CO2 SERPL-SCNC: 23 MMOL/L (ref 20–32)
CREAT SERPL-MCNC: 0.79 MG/DL (ref 0.66–1.25)
DIFFERENTIAL METHOD BLD: ABNORMAL
EOSINOPHIL # BLD AUTO: 0 10E9/L (ref 0–0.7)
EOSINOPHIL NFR BLD AUTO: 0.5 %
ERYTHROCYTE [DISTWIDTH] IN BLOOD BY AUTOMATED COUNT: 22.7 % (ref 10–15)
ETHANOL SERPL-MCNC: 0.3 G/DL
GFR SERPL CREATININE-BSD FRML MDRD: >90 ML/MIN/1.7M2
GLUCOSE SERPL-MCNC: 71 MG/DL (ref 70–99)
HCT VFR BLD AUTO: 32.7 % (ref 40–53)
HGB BLD-MCNC: 10.1 G/DL (ref 13.3–17.7)
HGB BLD-MCNC: 7.5 G/DL (ref 13.3–17.7)
HGB BLD-MCNC: 9.5 G/DL (ref 13.3–17.7)
IMM GRANULOCYTES # BLD: 0 10E9/L (ref 0–0.4)
IMM GRANULOCYTES NFR BLD: 0.2 %
INR PPP: 1.1 (ref 0.86–1.14)
LACTATE BLD-SCNC: 3.5 MMOL/L (ref 0.7–2)
LACTATE BLD-SCNC: 4 MMOL/L (ref 0.7–2)
LACTATE BLD-SCNC: 4.3 MMOL/L (ref 0.7–2)
LIPASE SERPL-CCNC: 585 U/L (ref 73–393)
LYMPHOCYTES # BLD AUTO: 1.1 10E9/L (ref 0.8–5.3)
LYMPHOCYTES NFR BLD AUTO: 18.2 %
MAGNESIUM SERPL-MCNC: 1.5 MG/DL (ref 1.6–2.3)
MCH RBC QN AUTO: 23 PG (ref 26.5–33)
MCHC RBC AUTO-ENTMCNC: 30.9 G/DL (ref 31.5–36.5)
MCV RBC AUTO: 74 FL (ref 78–100)
MONOCYTES # BLD AUTO: 0.6 10E9/L (ref 0–1.3)
MONOCYTES NFR BLD AUTO: 9.4 %
NEUTROPHILS # BLD AUTO: 4.2 10E9/L (ref 1.6–8.3)
NEUTROPHILS NFR BLD AUTO: 69.9 %
NRBC # BLD AUTO: 0 10*3/UL
NRBC BLD AUTO-RTO: 0 /100
PLATELET # BLD AUTO: 207 10E9/L (ref 150–450)
POTASSIUM SERPL-SCNC: 3.4 MMOL/L (ref 3.4–5.3)
PROT SERPL-MCNC: 7.2 G/DL (ref 6.8–8.8)
RBC # BLD AUTO: 4.4 10E12/L (ref 4.4–5.9)
SODIUM SERPL-SCNC: 142 MMOL/L (ref 133–144)
SPECIMEN EXP DATE BLD: NORMAL
UPPER GI ENDOSCOPY: NORMAL
WBC # BLD AUTO: 6.1 10E9/L (ref 4–11)

## 2018-09-29 PROCEDURE — G0500 MOD SEDAT ENDO SERVICE >5YRS: HCPCS | Performed by: INTERNAL MEDICINE

## 2018-09-29 PROCEDURE — C9113 INJ PANTOPRAZOLE SODIUM, VIA: HCPCS | Performed by: EMERGENCY MEDICINE

## 2018-09-29 PROCEDURE — 86901 BLOOD TYPING SEROLOGIC RH(D): CPT | Performed by: EMERGENCY MEDICINE

## 2018-09-29 PROCEDURE — 85018 HEMOGLOBIN: CPT | Performed by: INTERNAL MEDICINE

## 2018-09-29 PROCEDURE — 96366 THER/PROPH/DIAG IV INF ADDON: CPT

## 2018-09-29 PROCEDURE — 74177 CT ABD & PELVIS W/CONTRAST: CPT

## 2018-09-29 PROCEDURE — 80053 COMPREHEN METABOLIC PANEL: CPT | Performed by: EMERGENCY MEDICINE

## 2018-09-29 PROCEDURE — 87040 BLOOD CULTURE FOR BACTERIA: CPT | Performed by: INTERNAL MEDICINE

## 2018-09-29 PROCEDURE — 83605 ASSAY OF LACTIC ACID: CPT | Performed by: INTERNAL MEDICINE

## 2018-09-29 PROCEDURE — 25000128 H RX IP 250 OP 636: Performed by: INTERNAL MEDICINE

## 2018-09-29 PROCEDURE — 70450 CT HEAD/BRAIN W/O DYE: CPT

## 2018-09-29 PROCEDURE — 36415 COLL VENOUS BLD VENIPUNCTURE: CPT | Performed by: INTERNAL MEDICINE

## 2018-09-29 PROCEDURE — 25000128 H RX IP 250 OP 636: Performed by: EMERGENCY MEDICINE

## 2018-09-29 PROCEDURE — 96375 TX/PRO/DX INJ NEW DRUG ADDON: CPT

## 2018-09-29 PROCEDURE — 96365 THER/PROPH/DIAG IV INF INIT: CPT | Mod: 59

## 2018-09-29 PROCEDURE — 96368 THER/DIAG CONCURRENT INF: CPT

## 2018-09-29 PROCEDURE — A9270 NON-COVERED ITEM OR SERVICE: HCPCS | Mod: GY | Performed by: INTERNAL MEDICINE

## 2018-09-29 PROCEDURE — 99285 EMERGENCY DEPT VISIT HI MDM: CPT | Mod: 25

## 2018-09-29 PROCEDURE — 0DJ08ZZ INSPECTION OF UPPER INTESTINAL TRACT, VIA NATURAL OR ARTIFICIAL OPENING ENDOSCOPIC: ICD-10-PCS | Performed by: INTERNAL MEDICINE

## 2018-09-29 PROCEDURE — 71101 X-RAY EXAM UNILAT RIBS/CHEST: CPT | Mod: RT

## 2018-09-29 PROCEDURE — 43235 EGD DIAGNOSTIC BRUSH WASH: CPT | Performed by: INTERNAL MEDICINE

## 2018-09-29 PROCEDURE — 86900 BLOOD TYPING SEROLOGIC ABO: CPT | Performed by: EMERGENCY MEDICINE

## 2018-09-29 PROCEDURE — 25000125 ZZHC RX 250: Performed by: EMERGENCY MEDICINE

## 2018-09-29 PROCEDURE — C9113 INJ PANTOPRAZOLE SODIUM, VIA: HCPCS | Performed by: INTERNAL MEDICINE

## 2018-09-29 PROCEDURE — HZ2ZZZZ DETOXIFICATION SERVICES FOR SUBSTANCE ABUSE TREATMENT: ICD-10-PCS | Performed by: INTERNAL MEDICINE

## 2018-09-29 PROCEDURE — 12000000 ZZH R&B MED SURG/OB

## 2018-09-29 PROCEDURE — 25000131 ZZH RX MED GY IP 250 OP 636 PS 637: Mod: GY | Performed by: EMERGENCY MEDICINE

## 2018-09-29 PROCEDURE — 80320 DRUG SCREEN QUANTALCOHOLS: CPT | Performed by: EMERGENCY MEDICINE

## 2018-09-29 PROCEDURE — 25000132 ZZH RX MED GY IP 250 OP 250 PS 637: Mod: GY | Performed by: INTERNAL MEDICINE

## 2018-09-29 PROCEDURE — 86850 RBC ANTIBODY SCREEN: CPT | Performed by: EMERGENCY MEDICINE

## 2018-09-29 PROCEDURE — 83735 ASSAY OF MAGNESIUM: CPT | Performed by: EMERGENCY MEDICINE

## 2018-09-29 PROCEDURE — 85610 PROTHROMBIN TIME: CPT | Performed by: EMERGENCY MEDICINE

## 2018-09-29 PROCEDURE — 85025 COMPLETE CBC W/AUTO DIFF WBC: CPT | Performed by: EMERGENCY MEDICINE

## 2018-09-29 PROCEDURE — 99223 1ST HOSP IP/OBS HIGH 75: CPT | Mod: AI | Performed by: INTERNAL MEDICINE

## 2018-09-29 PROCEDURE — 83690 ASSAY OF LIPASE: CPT | Performed by: EMERGENCY MEDICINE

## 2018-09-29 PROCEDURE — 25000125 ZZHC RX 250: Performed by: INTERNAL MEDICINE

## 2018-09-29 RX ORDER — TRAZODONE HYDROCHLORIDE 100 MG/1
200 TABLET ORAL
Status: DISCONTINUED | OUTPATIENT
Start: 2018-09-29 | End: 2018-10-05 | Stop reason: HOSPADM

## 2018-09-29 RX ORDER — ATORVASTATIN CALCIUM 10 MG/1
10 TABLET, FILM COATED ORAL AT BEDTIME
Status: DISCONTINUED | OUTPATIENT
Start: 2018-09-30 | End: 2018-10-05 | Stop reason: HOSPADM

## 2018-09-29 RX ORDER — OXYCODONE HYDROCHLORIDE 5 MG/1
5 TABLET ORAL
Status: DISCONTINUED | OUTPATIENT
Start: 2018-09-29 | End: 2018-10-01

## 2018-09-29 RX ORDER — FENTANYL CITRATE 50 UG/ML
INJECTION, SOLUTION INTRAMUSCULAR; INTRAVENOUS PRN
Status: DISCONTINUED | OUTPATIENT
Start: 2018-09-29 | End: 2018-09-29 | Stop reason: HOSPADM

## 2018-09-29 RX ORDER — SPIRONOLACTONE 25 MG/1
25 TABLET ORAL DAILY
Status: DISCONTINUED | OUTPATIENT
Start: 2018-09-29 | End: 2018-10-05 | Stop reason: HOSPADM

## 2018-09-29 RX ORDER — DIAZEPAM 5 MG
10 TABLET ORAL 3 TIMES DAILY
Status: DISCONTINUED | OUTPATIENT
Start: 2018-09-29 | End: 2018-10-01

## 2018-09-29 RX ORDER — QUETIAPINE FUMARATE 25 MG/1
25-50 TABLET, FILM COATED ORAL EVERY 6 HOURS PRN
Status: DISCONTINUED | OUTPATIENT
Start: 2018-09-29 | End: 2018-10-05 | Stop reason: HOSPADM

## 2018-09-29 RX ORDER — MIRTAZAPINE 15 MG/1
30 TABLET, FILM COATED ORAL AT BEDTIME
Status: DISCONTINUED | OUTPATIENT
Start: 2018-09-29 | End: 2018-10-05 | Stop reason: HOSPADM

## 2018-09-29 RX ORDER — ONDANSETRON 2 MG/ML
4 INJECTION INTRAMUSCULAR; INTRAVENOUS EVERY 6 HOURS PRN
Status: DISCONTINUED | OUTPATIENT
Start: 2018-09-29 | End: 2018-10-05 | Stop reason: HOSPADM

## 2018-09-29 RX ORDER — ALBUTEROL SULFATE 90 UG/1
2 AEROSOL, METERED RESPIRATORY (INHALATION) EVERY 4 HOURS PRN
Status: DISCONTINUED | OUTPATIENT
Start: 2018-09-29 | End: 2018-10-05 | Stop reason: HOSPADM

## 2018-09-29 RX ORDER — FENTANYL CITRATE 50 UG/ML
50 INJECTION, SOLUTION INTRAMUSCULAR; INTRAVENOUS ONCE
Status: COMPLETED | OUTPATIENT
Start: 2018-09-29 | End: 2018-09-29

## 2018-09-29 RX ORDER — DIAZEPAM 5 MG
20 TABLET ORAL ONCE
Status: COMPLETED | OUTPATIENT
Start: 2018-09-29 | End: 2018-09-29

## 2018-09-29 RX ORDER — NICOTINE 21 MG/24HR
1 PATCH, TRANSDERMAL 24 HOURS TRANSDERMAL DAILY
Status: DISCONTINUED | OUTPATIENT
Start: 2018-09-29 | End: 2018-10-05 | Stop reason: HOSPADM

## 2018-09-29 RX ORDER — OCTREOTIDE ACETATE 50 UG/ML
50 INJECTION, SOLUTION INTRAVENOUS; SUBCUTANEOUS ONCE
Status: COMPLETED | OUTPATIENT
Start: 2018-09-29 | End: 2018-09-29

## 2018-09-29 RX ORDER — TAMSULOSIN HYDROCHLORIDE 0.4 MG/1
0.4 CAPSULE ORAL DAILY
Status: DISCONTINUED | OUTPATIENT
Start: 2018-09-29 | End: 2018-10-01

## 2018-09-29 RX ORDER — QUETIAPINE FUMARATE 25 MG/1
50 TABLET, FILM COATED ORAL 3 TIMES DAILY PRN
Status: DISCONTINUED | OUTPATIENT
Start: 2018-09-29 | End: 2018-10-05 | Stop reason: HOSPADM

## 2018-09-29 RX ORDER — METHOCARBAMOL 750 MG/1
750 TABLET, FILM COATED ORAL 3 TIMES DAILY PRN
Status: DISCONTINUED | OUTPATIENT
Start: 2018-09-29 | End: 2018-10-05 | Stop reason: HOSPADM

## 2018-09-29 RX ORDER — MULTIPLE VITAMINS W/ MINERALS TAB 9MG-400MCG
1 TAB ORAL DAILY
Status: DISCONTINUED | OUTPATIENT
Start: 2018-09-29 | End: 2018-10-05 | Stop reason: HOSPADM

## 2018-09-29 RX ORDER — ACETAMINOPHEN 325 MG/1
650 TABLET ORAL EVERY 4 HOURS PRN
Status: DISCONTINUED | OUTPATIENT
Start: 2018-09-29 | End: 2018-10-05 | Stop reason: HOSPADM

## 2018-09-29 RX ORDER — DULOXETIN HYDROCHLORIDE 60 MG/1
60 CAPSULE, DELAYED RELEASE ORAL DAILY
Status: DISCONTINUED | OUTPATIENT
Start: 2018-09-30 | End: 2018-10-05 | Stop reason: HOSPADM

## 2018-09-29 RX ORDER — FOLIC ACID 1 MG/1
1 TABLET ORAL DAILY
Status: DISCONTINUED | OUTPATIENT
Start: 2018-09-29 | End: 2018-10-05 | Stop reason: HOSPADM

## 2018-09-29 RX ORDER — HYDROCHLOROTHIAZIDE 25 MG/1
25 TABLET ORAL DAILY
Status: DISCONTINUED | OUTPATIENT
Start: 2018-09-29 | End: 2018-10-05 | Stop reason: HOSPADM

## 2018-09-29 RX ORDER — SPIRONOLACTONE AND HYDROCHLOROTHIAZIDE 25; 25 MG/1; MG/1
1 TABLET ORAL DAILY
Status: DISCONTINUED | OUTPATIENT
Start: 2018-09-29 | End: 2018-09-29

## 2018-09-29 RX ORDER — SODIUM CHLORIDE 9 MG/ML
INJECTION, SOLUTION INTRAVENOUS CONTINUOUS
Status: DISCONTINUED | OUTPATIENT
Start: 2018-09-29 | End: 2018-10-01

## 2018-09-29 RX ORDER — NALOXONE HYDROCHLORIDE 0.4 MG/ML
.1-.4 INJECTION, SOLUTION INTRAMUSCULAR; INTRAVENOUS; SUBCUTANEOUS
Status: DISCONTINUED | OUTPATIENT
Start: 2018-09-29 | End: 2018-10-05 | Stop reason: HOSPADM

## 2018-09-29 RX ORDER — IOPAMIDOL 755 MG/ML
95 INJECTION, SOLUTION INTRAVASCULAR ONCE
Status: COMPLETED | OUTPATIENT
Start: 2018-09-29 | End: 2018-09-29

## 2018-09-29 RX ORDER — LANOLIN ALCOHOL/MO/W.PET/CERES
100 CREAM (GRAM) TOPICAL DAILY
Status: COMPLETED | OUTPATIENT
Start: 2018-09-29 | End: 2018-10-03

## 2018-09-29 RX ORDER — DIAZEPAM 10 MG/2ML
5-10 INJECTION, SOLUTION INTRAMUSCULAR; INTRAVENOUS EVERY 30 MIN PRN
Status: DISCONTINUED | OUTPATIENT
Start: 2018-09-29 | End: 2018-10-05 | Stop reason: HOSPADM

## 2018-09-29 RX ORDER — HYDROXYZINE HYDROCHLORIDE 25 MG/1
50 TABLET, FILM COATED ORAL
Status: DISCONTINUED | OUTPATIENT
Start: 2018-09-29 | End: 2018-10-05 | Stop reason: HOSPADM

## 2018-09-29 RX ORDER — ONDANSETRON 4 MG/1
4 TABLET, ORALLY DISINTEGRATING ORAL EVERY 6 HOURS PRN
Status: DISCONTINUED | OUTPATIENT
Start: 2018-09-29 | End: 2018-10-05 | Stop reason: HOSPADM

## 2018-09-29 RX ADMIN — OXYCODONE HYDROCHLORIDE 5 MG: 5 TABLET ORAL at 19:23

## 2018-09-29 RX ADMIN — Medication 10 MG: at 15:01

## 2018-09-29 RX ADMIN — MIDAZOLAM 2 MG: 1 INJECTION INTRAMUSCULAR; INTRAVENOUS at 11:22

## 2018-09-29 RX ADMIN — OCTREOTIDE ACETATE 50 MCG/HR: 200 INJECTION, SOLUTION INTRAVENOUS; SUBCUTANEOUS at 06:25

## 2018-09-29 RX ADMIN — Medication 10 MG: at 21:35

## 2018-09-29 RX ADMIN — OXYCODONE HYDROCHLORIDE 5 MG: 5 TABLET ORAL at 15:58

## 2018-09-29 RX ADMIN — TRAZODONE HYDROCHLORIDE 200 MG: 100 TABLET ORAL at 23:31

## 2018-09-29 RX ADMIN — TOPICAL ANESTHETIC 1 EACH: 200 SPRAY DENTAL; PERIODONTAL at 11:22

## 2018-09-29 RX ADMIN — OXYCODONE HYDROCHLORIDE 5 MG: 5 TABLET ORAL at 23:31

## 2018-09-29 RX ADMIN — SODIUM CHLORIDE 8 MG/HR: 9 INJECTION, SOLUTION INTRAVENOUS at 06:47

## 2018-09-29 RX ADMIN — SODIUM CHLORIDE 80 MG: 9 INJECTION, SOLUTION INTRAVENOUS at 06:38

## 2018-09-29 RX ADMIN — MIRTAZAPINE 30 MG: 15 TABLET, FILM COATED ORAL at 21:35

## 2018-09-29 RX ADMIN — DIAZEPAM 20 MG: 5 TABLET ORAL at 09:05

## 2018-09-29 RX ADMIN — SODIUM CHLORIDE 500 ML: 9 INJECTION, SOLUTION INTRAVENOUS at 13:34

## 2018-09-29 RX ADMIN — SODIUM CHLORIDE, PRESERVATIVE FREE 69 ML: 5 INJECTION INTRAVENOUS at 07:15

## 2018-09-29 RX ADMIN — SODIUM CHLORIDE 8 MG/HR: 9 INJECTION, SOLUTION INTRAVENOUS at 09:07

## 2018-09-29 RX ADMIN — FENTANYL CITRATE 50 MCG: 50 INJECTION, SOLUTION INTRAMUSCULAR; INTRAVENOUS at 07:33

## 2018-09-29 RX ADMIN — Medication 100 MG: at 12:58

## 2018-09-29 RX ADMIN — SPIRONOLACTONE 25 MG: 25 TABLET, FILM COATED ORAL at 17:25

## 2018-09-29 RX ADMIN — FENTANYL CITRATE 100 MCG: 50 INJECTION, SOLUTION INTRAMUSCULAR; INTRAVENOUS at 11:22

## 2018-09-29 RX ADMIN — IOPAMIDOL 95 ML: 755 INJECTION, SOLUTION INTRAVENOUS at 07:15

## 2018-09-29 RX ADMIN — HYDROCHLOROTHIAZIDE 25 MG: 25 TABLET ORAL at 15:58

## 2018-09-29 RX ADMIN — OXYCODONE HYDROCHLORIDE 5 MG: 5 TABLET ORAL at 08:48

## 2018-09-29 RX ADMIN — OCTREOTIDE ACETATE 50 MCG: 50 INJECTION, SOLUTION INTRAVENOUS; SUBCUTANEOUS at 06:24

## 2018-09-29 RX ADMIN — SODIUM CHLORIDE: 9 INJECTION, SOLUTION INTRAVENOUS at 13:03

## 2018-09-29 RX ADMIN — QUETIAPINE FUMARATE 50 MG: 25 TABLET ORAL at 16:38

## 2018-09-29 RX ADMIN — NICOTINE 1 PATCH: 21 PATCH, EXTENDED RELEASE TRANSDERMAL at 12:56

## 2018-09-29 RX ADMIN — OXYCODONE HYDROCHLORIDE 5 MG: 5 TABLET ORAL at 13:00

## 2018-09-29 RX ADMIN — SODIUM CHLORIDE 8 MG/HR: 9 INJECTION, SOLUTION INTRAVENOUS at 19:30

## 2018-09-29 RX ADMIN — FLUTICASONE FUROATE AND VILANTEROL TRIFENATATE 1 PUFF: 200; 25 POWDER RESPIRATORY (INHALATION) at 15:58

## 2018-09-29 RX ADMIN — MULTIPLE VITAMINS W/ MINERALS TAB 1 TABLET: TAB at 12:57

## 2018-09-29 RX ADMIN — MIDAZOLAM 2 MG: 1 INJECTION INTRAMUSCULAR; INTRAVENOUS at 11:24

## 2018-09-29 RX ADMIN — FOLIC ACID 1 MG: 1 TABLET ORAL at 12:57

## 2018-09-29 RX ADMIN — SODIUM CHLORIDE: 9 INJECTION, SOLUTION INTRAVENOUS at 22:56

## 2018-09-29 RX ADMIN — TAMSULOSIN HYDROCHLORIDE 0.4 MG: 0.4 CAPSULE ORAL at 15:01

## 2018-09-29 ASSESSMENT — ENCOUNTER SYMPTOMS
COUGH: 1
HEADACHES: 0
FEVER: 0
FREQUENCY: 0
DYSURIA: 0
NAUSEA: 0
RHINORRHEA: 1
HEMATURIA: 0
VOMITING: 1
ABDOMINAL PAIN: 1
BLOOD IN STOOL: 0
CHILLS: 0
SHORTNESS OF BREATH: 1

## 2018-09-29 ASSESSMENT — ACTIVITIES OF DAILY LIVING (ADL)
ADLS_ACUITY_SCORE: 9
ADLS_ACUITY_SCORE: 9

## 2018-09-29 NOTE — IP AVS SNAPSHOT
"[place] [address] ONCOLOGY: [phone]                                              INTERAGENCY TRANSFER FORM - PHYSICIAN ORDERS   2018                    Hospital Admission Date: 2018  DEVONTE SEGURA   : 1955  Sex: Male        Attending Provider: Gray Burns MD     Allergies:  No Known Allergies    Infection:  None   Service:  HOSPITALIST    Ht:  1.702 m (5' 7\")   Wt:  --   Admission Wt:  --    BMI:  --   BSA:  --            Patient PCP Information     Provider PCP Type    FERNANDA BRANTLEY MD General      ED Clinical Impression     Diagnosis Description Comment Added By Time Added    Hematemesis with nausea [K92.0] Hematemesis with nausea [K92.0]  Adriana Deleon MD 2018  7:54 AM    Closed fracture of multiple ribs of right side, initial encounter [S22.41XA] Closed fracture of multiple ribs of right side, initial encounter [S22.41XA]  Adriana Deleon MD 2018  7:54 AM    Alcoholic hepatitis without ascites [K70.10] Alcoholic hepatitis without ascites [K70.10]  Adriana Deleon MD 2018  7:54 AM    Secondary esophageal varices with bleeding (H) [I85.11] Secondary esophageal varices with bleeding (H) [I85.11]  Adriana Deleon MD 2018  7:54 AM    Alcoholic intoxication without complication (H) [F10.920] Alcoholic intoxication without complication (H) [F10.920]  Adriana Deleon MD 2018  7:55 AM    Fall, initial encounter [W19.XXXA] Fall, initial encounter [W19.XXXA]  Adriana Deleon MD 2018  7:55 AM      Hospital Problems as of 10/4/2018              Priority Class Noted POA    GI bleed Medium  3/21/2018 Yes    * (Principal)Hematemesis Medium  2018 Yes    Alcohol abuse Medium  2018 Unknown    Rib Fractures (right 7th, 8th, 9th) -- occured 2018 Medium  2018 Unknown    Alcoholic hepatitis without ascites Medium  2018 Unknown    Acute blood loss anemia Medium  2018 Unknown    Esophageal " varices (EGD with 6 varices banded 4/27/18) Medium  9/29/2018 Unknown    Essential hypertension Medium  9/29/2018 Unknown    COPD (chronic obstructive pulmonary disease) (H) Medium  9/29/2018 Unknown    Smoker Medium  9/29/2018 Unknown    JANIS on CPAP Medium  9/29/2018 Unknown    Chronic low back pain Medium  9/29/2018 Unknown      Non-Hospital Problems as of 10/4/2018              Priority Class Noted    PAD (peripheral artery disease) (H) Medium  6/12/2012    MENTAL HEALTH Medium  6/14/2014    Anxiety and depression Medium  6/21/2014    Spinal stenosis, lumbar region, with neurogenic claudication Medium  9/22/2014    Alcohol withdrawal (H) Medium  1/12/2015    Fall (on) (from) other stairs and steps, initial encounter Medium  10/7/2016    Pain management Medium  10/8/2016    Chemical dependency (H) Medium  12/21/2016    Depression Medium  2/8/2017    Suicidal ideation Medium  12/21/2017      Code Status History     Date Active Date Inactive Code Status Order ID Comments User Context    10/4/2018  3:15 PM  DNR [id number]  Jude Duarte, DO Outpatient    9/29/2018 10:27 AM 10/4/2018  3:15 PM DNR/DNI [id number]  Gray Burns MD Inpatient    5/4/2018 11:50 AM 9/29/2018 10:27 AM DNR/DNI [id number]  Godfrey Vasques, DO Outpatient    4/26/2018 10:29 PM 5/4/2018 11:50 AM DNR/DNI [id number]  Raquel Chinchilla MD Inpatient    3/24/2018  9:58 AM 4/26/2018 10:29 PM Full Code [id number]  Piter Sequeira, DO Outpatient    3/21/2018  7:14 PM 3/24/2018  9:58 AM Full Code [id number]  Danya Grissom MD Inpatient    12/21/2017  4:03 AM 12/21/2017  5:39 PM Full Code [id number]  Merna Hawk, VASQUEZ Inpatient    2/8/2017  3:28 PM 2/10/2017  4:52 PM Full Code [id number]  Naomi Kay RN Inpatient    10/10/2016  3:46 PM 2/8/2017  3:28 PM DNR/DNI [id number]  Kelvin Mendez MD Outpatient    10/7/2016  9:04 AM 10/10/2016  3:46 PM DNR/DNI [id number]  Alena Byrd MD Inpatient    1/17/2015 12:38 PM  10/7/2016  9:04 AM Full Code [id number]  Kristi Robison MD Outpatient    1/12/2015 10:49 PM 1/17/2015 12:38 PM Full Code [id number]  Piter Sequeira DO Inpatient    6/21/2014  2:23 AM 6/25/2014  3:05 PM Full Code [id number]  Jessica Simpson RN Inpatient    6/14/2014  7:07 PM 6/16/2014  6:03 PM Full Code [id number]  Jeni Palma RN Inpatient         Medication Review      START taking        Dose / Directions Comments    multivitamin, therapeutic with minerals Tabs tablet   Used for:  Alcoholic intoxication without complication (H)        Dose:  1 tablet   Start taking on:  10/5/2018   Take 1 tablet by mouth daily   Quantity:  30 each   Refills:  0        oxyCODONE IR 5 MG tablet   Commonly known as:  ROXICODONE        Dose:  5 mg   Take 1 tablet (5 mg) by mouth 4 times daily as needed for pain   Quantity:  10 tablet   Refills:  0          CONTINUE these medications which have NOT CHANGED        Dose / Directions Comments    albuterol 108 (90 Base) MCG/ACT inhaler   Commonly known as:  PROAIR HFA/PROVENTIL HFA/VENTOLIN HFA        Dose:  2 puff   Inhale 2 puffs into the lungs every 6 hours as needed   Refills:  0        ATORVASTATIN CALCIUM PO        Dose:  10 mg   Take 10 mg by mouth At Bedtime   Refills:  0        BREO ELLIPTA 200-25 MCG/INH inhaler   Generic drug:  fluticasone-vilanterol        Dose:  1 puff   Inhale 1 puff into the lungs daily as needed   Refills:  0        DULoxetine 60 MG EC capsule   Commonly known as:  CYMBALTA   Used for:  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)        Dose:  60 mg   Take 1 capsule (60 mg) by mouth daily   Quantity:  30 capsule   Refills:  0        hydrOXYzine 25 MG tablet   Commonly known as:  ATARAX        Dose:  50 mg   Take 2 tablets (50 mg) by mouth nightly as needed (sleep)   Quantity:  30 tablet   Refills:  0        methocarbamol 750 MG tablet   Commonly known as:  ROBAXIN        Dose:  750 mg   Take 1 tablet (750 mg) by mouth  3 times daily as needed for muscle spasms   Quantity:  15 tablet   Refills:  0        MIRTAZAPINE PO        Dose:  30 mg   Take 30 mg by mouth At Bedtime   Refills:  0        omeprazole 40 MG capsule   Commonly known as:  priLOSEC   Used for:  Hematemesis without nausea        Dose:  40 mg   Take 1 capsule (40 mg) by mouth 2 times daily (before meals) Take 30-60 minutes before a meal.   Quantity:  60 capsule   Refills:  1        QUETIAPINE FUMARATE PO        Dose:  50 mg   Take 50 mg by mouth 3 times daily as needed   Refills:  0        spironolactone-hydrochlorothiazide 25-25 MG per tablet   Commonly known as:  ALDACTAZIDE        Dose:  1 tablet   Take 1 tablet by mouth daily   Refills:  0        TAMSULOSIN HCL PO        Dose:  0.4 mg   Take 0.4 mg by mouth daily   Refills:  0        TRAZODONE HCL PO        Dose:  100 mg   Take 100 mg by mouth nightly as needed (Takes 2 x 100mg for a total of 200mg at bedtime)   Refills:  0                After Care     Activity - Up with nursing assistance           Advance Diet as Tolerated       Follow this diet upon discharge: Orders Placed This Encounter      Regular Diet Adult       Fall precautions           General info for SNF       Length of Stay Estimate: Short Term Care: Estimated # of Days <30  Condition at Discharge: Improving  Level of care:skilled   Rehabilitation Potential: Good  Admission H&P remains valid and up-to-date: Yes  Recent Chemotherapy: N/A  Use Nursing Home Standing Orders: Yes       Mantoux instructions       Give two-step Mantoux (PPD) Per Facility Policy Yes             Referrals     Occupational Therapy Adult Consult       Evaluate and treat as clinically indicated.    Reason:  Weakness and deconditioning       Physical Therapy Adult Consult       Evaluate and treat as clinically indicated.    Reason:  Weakness and deconditioning             Follow-Up Appointment Instructions     Future Labs/Procedures    Follow Up and recommended labs and tests      Comments:    Follow up with PCP in 1 week ++ repeat Hgb and LFTs in 5 days      Follow-Up Appointment Instructions     Follow Up and recommended labs and tests       Follow up with PCP in 1 week ++ repeat Hgb and LFTs in 5 days             Statement of Approval     Ordered          10/04/18 1516  I have reviewed and agree with all the recommendations and orders detailed in this document.  EFFECTIVE NOW     Approved and electronically signed by:  Jude Duarte DO

## 2018-09-29 NOTE — ED NOTES
Bed: ED02  Expected date: 9/29/18  Expected time: 5:40 AM  Means of arrival: Ambulance  Comments:  HEMS 441 63M vomiting blood; etoh; hx varices

## 2018-09-29 NOTE — PHARMACY-ADMISSION MEDICATION HISTORY
Admission medication history interview status for the 9/29/2018  admission is complete. See EPIC admission navigator for prior to admission medications     Medication history source reliability:Good    Actions taken by pharmacist (provider contacted, etc):Interveiwed pt     Additional medication history information not noted on PTA med list :Pt states he is supposed to be taking omeprazole BID but has not been taking    Medication reconciliation/reorder completed by provider prior to medication history? No    Time spent in this activity: 15 minutes    Prior to Admission medications    Medication Sig Last Dose Taking? Auth Provider   albuterol (PROAIR HFA/PROVENTIL HFA/VENTOLIN HFA) 108 (90 BASE) MCG/ACT Inhaler Inhale 2 puffs into the lungs every 6 hours as needed  PRn Yes Unknown, Entered By History   ATORVASTATIN CALCIUM PO Take 10 mg by mouth At Bedtime  9/28/2018 at Unknown time Yes Unknown, Entered By History   DULoxetine (CYMBALTA) 60 MG EC capsule Take 1 capsule (60 mg) by mouth daily 9/28/2018 at Unknown time Yes Brody Rand MD   fluticasone-vilanterol (BREO ELLIPTA) 200-25 MCG/INH oral inhaler Inhale 1 puff into the lungs daily as needed  PRN Yes Unknown, Entered By History   hydrOXYzine (ATARAX) 25 MG tablet Take 2 tablets (50 mg) by mouth nightly as needed (sleep) PRN at Unknown time Yes Dagoberto Garcia MD   methocarbamol (ROBAXIN) 750 MG tablet Take 1 tablet (750 mg) by mouth 3 times daily as needed for muscle spasms PRN at Unknown time Yes Dagoberto Garcia MD   MIRTAZAPINE PO Take 30 mg by mouth At Bedtime 9/28/2018 at Unknown time Yes Unknown, Entered By History   omeprazole (PRILOSEC) 40 MG capsule Take 1 capsule (40 mg) by mouth 2 times daily (before meals) Take 30-60 minutes before a meal. Past Month at Unknown time Yes Piter Sequeira,    QUETIAPINE FUMARATE PO Take 50 mg by mouth 3 times daily as needed PRN Yes Unknown, Entered By History   spironolactone-hydrochlorothiazide  (ALDACTAZIDE) 25-25 MG per tablet Take 1 tablet by mouth daily 9/28/2018 at Unknown time Yes Unknown, Entered By History   TAMSULOSIN HCL PO Take 0.4 mg by mouth daily 9/28/2018 at Unknown time Yes Unknown, Entered By History   TRAZODONE HCL PO Take 100 mg by mouth nightly as needed (Takes 2 x 100mg for a total of 200mg at bedtime) PRN Yes Unknown, Entered By History

## 2018-09-29 NOTE — PROGRESS NOTES
EGD: Portal gastropathy, grade 1 esophageal varices, multiple superficial duodenal ulcers.  Diffusely congested stomach no signs of active GI bleeding.  Plan: Clear liquid diet today.  Continue on IV Protonix.  Serial hemoglobin every 6 hours.  We will stop octreotide.    Rosendo Shaw MD

## 2018-09-29 NOTE — H&P
[facility]    History and Physical  Hospitalist       Date of Admission:  9/29/2018    Assessment & Plan   Jim Richard is a 63 year old male who is an alcoholic, has liver disease with esophagela varices and was admitted in April 2018 with Esophageal varice bleed (6 bands placed) who is actively drinking, presents with vomiting small amount of blood twice this AM:    Summary:    Principal Problem:    Hematemesis  Active Problems:    Alcohol abuse    Rib Fractures (right 7th, 8th, 9th) -- occured 9/9/2018    Alcoholic hepatitis without ascites    Acute blood loss anemia    Esophageal varices (EGD with 6 varices banded 4/27/18)    Essential hypertension    COPD (chronic obstructive pulmonary disease) (H)    Smoker    JANIS on CPAP    Chronic low back pain       Plan: Admit to medicine floor, is on Octreatide infusion, and IV Protonix 8 mg/min IV after initial 80 mg IV load, will get serial hgb's, and GI consult to consider EGD at some point.  Updated brother (Jame), and patient has living will and desires DNR/DNI.      DVT Prophylaxis: Pneumatic Compression Devices  Code Status: DNR / DNI    Disposition: Expected discharge in 3 days once bleeding stops and over alcohol withdrawal.    Gray Vincent MD  Pager: [phone]  Cell Phone:  [phone]       Primary Care Physician   FERNANDA BRANTLEY    Chief Complaint   Vomited blood    History is obtained from Patient    History of Present Illness    63 year old male who is an alcoholic, has liver disease with esophagela varices and was admitted in April 2018 with Esophageal varice bleed (6 bands placed) who is actively drinking, presents with vomiting small amount of blood twice at 0400 today and called 911.  He has been drinking Vodka 0.75 liter/day and admits to be doing so for the last 3 months, brother says it has been longer.  He wants to quit and plans to go to AA at discharge -- has gone to several treatment programs in the  past, and says he recently quit for 9 months (brother says it was more like 3 months).  Last admitted on 4/26/18 to 5/4/18 -- EGD then showed grade III esophageal varices and 6 bands were placed.  His hgb dropped to 5.8 then and he got 2 units of blood.  His last drink was MN (8 hours ago), and he denies melena or BRBPR.  He recalls falling 20 days ago (9/9/18) and landed on right side and it has hurt since then, and he has broken ribs on left side in the past as well.  He stopped all his medicines about ?3 weeks ago.     Past Medical History    I have reviewed this patient's medical history and updated it with pertinent information if needed.   Past Medical History:   Diagnosis Date     Alcoholism (H) 1/14/2013    had treatment at [facility]     Anxiety      Coronary artery disease 09/09/2014    Nuclear stress test with slight fixed defect inferiorly, no ischemia     Depression     w/anxiety     Esophageal varices with bleeding 04/28/2018    6 varices banded on EGD     Heart attack      Hepatomegaly     Fatty liver, chronic alcoholic     Hyperlipemia      Hypertension      JANIS on CPAP      Peripheral vascular disease (H)      PVD (peripheral vascular disease) (H)      SDH (subdural hematoma) (H) 04/26/2018    Right side, resolved spontaneously     Spinal stenosis        Past Surgical History   I have reviewed this patient's surgical history and updated it with pertinent information if needed.  Past Surgical History:   Procedure Laterality Date     APPENDECTOMY       BYPASS GRAFT FEMOROPOPLITEAL  6/12/2012    Procedure: BYPASS GRAFT FEMOROPOPLITEAL;  LEFT FEMORAL TO ABOVE KNEE POPLITEAL BYPASS, ENDARTERECTOMY OF SFA AND PF ARTERIES, LEFT EXTERNAL ILIAC TO COMMON FEMORAL INTERPOSITION GRAFT;  Surgeon: Damir Roberts MD;  Location:  OR     COLONOSCOPY       ENDARTERECTOMY FEMORAL  6/12/2012    Procedure: ENDARTERECTOMY FEMORAL;;  Surgeon: Damir Roberts MD;  Location:  OR     ESOPHAGOSCOPY, GASTROSCOPY,  DUODENOSCOPY (EGD), COMBINED N/A 3/22/2018    Procedure: COMBINED ESOPHAGOSCOPY, GASTROSCOPY, DUODENOSCOPY (EGD);  ESOPHAGOSCOPY, GASTROSCOPY, DUODENOSOCPY.;  Surgeon: Valeri Pruitt MD;  Location:  OR     HERNIA REPAIR  2017    Abdominal     LAMINECTOMY, FUSION LUMBAR THREE+ LEVEL, COMBINED N/A 9/22/2014    Procedure: COMBINED LAMINECTOMY, FUSION LUMBAR THREE+ LEVEL;  Surgeon: Sebastien Pruitt MD;  Location:  OR     TONSILLECTOMY & ADENOIDECTOMY         Prior to Admission Medications   Prior to Admission Medications   Prescriptions Last Dose Informant Patient Reported? Taking?   ATORVASTATIN CALCIUM PO 9/28/2018 at Unknown time Self Yes Yes   Sig: Take 10 mg by mouth At Bedtime    DULoxetine (CYMBALTA) 60 MG EC capsule 9/28/2018 at Unknown time Self No Yes   Sig: Take 1 capsule (60 mg) by mouth daily   MIRTAZAPINE PO 9/28/2018 at Unknown time Self Yes Yes   Sig: Take 30 mg by mouth At Bedtime   QUETIAPINE FUMARATE PO PRN Self Yes Yes   Sig: Take 50 mg by mouth 3 times daily as needed   TAMSULOSIN HCL PO 9/28/2018 at Unknown time Self Yes Yes   Sig: Take 0.4 mg by mouth daily   TRAZODONE HCL PO PRN Self Yes Yes   Sig: Take 100 mg by mouth nightly as needed (Takes 2 x 100mg for a total of 200mg at bedtime)   albuterol (PROAIR HFA/PROVENTIL HFA/VENTOLIN HFA) 108 (90 BASE) MCG/ACT Inhaler PRn Self Yes Yes   Sig: Inhale 2 puffs into the lungs every 6 hours as needed    fluticasone-vilanterol (BREO ELLIPTA) 200-25 MCG/INH oral inhaler PRN Self Yes Yes   Sig: Inhale 1 puff into the lungs daily as needed    hydrOXYzine (ATARAX) 25 MG tablet PRN at Unknown time Self No Yes   Sig: Take 2 tablets (50 mg) by mouth nightly as needed (sleep)   methocarbamol (ROBAXIN) 750 MG tablet PRN at Unknown time Self No Yes   Sig: Take 1 tablet (750 mg) by mouth 3 times daily as needed for muscle spasms   omeprazole (PRILOSEC) 40 MG capsule Past Month at Unknown time Self No Yes   Sig: Take 1 capsule (40 mg) by mouth 2 times daily  (before meals) Take 30-60 minutes before a meal.   spironolactone-hydrochlorothiazide (ALDACTAZIDE) 25-25 MG per tablet 9/28/2018 at Unknown time Self Yes Yes   Sig: Take 1 tablet by mouth daily      Facility-Administered Medications: None     Allergies   No Known Allergies    Social History   I have reviewed this patient's social history and updated it with pertinent information if needed. Jim Kylecarlitadougie  reports that he has been smoking Cigarettes.  He has been smoking about 1.00 pack per day. He has never used smokeless tobacco. He reports that he drinks alcohol. He reports that he does not use illicit drugs.    Family History   I have reviewed this patient's family history and updated it with pertinent information if needed.   Family History   Problem Relation Age of Onset     Mental Illness Son      Coronary Artery Disease Early Onset Mother      Substance Abuse Father      Lung Cancer Father      Substance Abuse Brother      Unknown/Adopted No family hx of      Depression No family hx of      Anxiety Disorder No family hx of      Schizophrenia No family hx of      Bipolar Disorder No family hx of      Suicide No family hx of      Dementia No family hx of      Anthony Disease No family hx of      Parkinsonism No family hx of      Autism Spectrum Disorder No family hx of      Intellectual Disability (Mental Retardation) No family hx of        Review of Systems   The 10 point Review of Systems is negative other than noted in the HPI or here.     # Pain Assessment:  Current Pain Score 9/29/2018   Patient currently in pain? -   Pain score (0-10) 6   Pain location -   Pain descriptors -   - Jim is experiencing pain due to right rib fractures. Pain management was discussed and the plan was created in a collaborative fashion.  Jim's response to the current recommendations: engaged  - see orders      Physical Exam   Temp: 98.3  F (36.8  C) Temp src: Oral BP: 104/48 Pulse: 81 Heart Rate: 82 Resp: 16 SpO2: 93  % O2 Device: None (Room air)    Vital Signs with Ranges  Temp:  [98.3  F (36.8  C)] 98.3  F (36.8  C)  Pulse:  [81] 81  Heart Rate:  [64-84] 82  Resp:  [10-16] 16  BP: (101-160)/(41-82) 104/48  SpO2:  [93 %-98 %] 93 %  0 lbs 0 oz    Constitutional: Awake, alert, cooperative, no apparent distress.  Eyes: Conjunctiva and pupils examined and normal.  HEENT: Moist mucous membranes, normal dentition.  Respiratory: Slight expiratory wheeze bilaterally  Cardiovascular: Regular rate and rhythm, normal S1 and S2, and no murmur noted,   No carotid bruits.  1+ bilateral ankle edema.   Chest: tender over right lower chest wall, no flail chest  GI: Soft, non-distended, mild RUQ tenderness but no epigastric tenderness, normal bowel sounds.  Lymph/Hematologic: No anterior cervical, supraclavicular or axillary adenopathy.  Skin: No rashes, no cyanosis.   Musculoskeletal: No joint swelling, erythema or tenderness.  Neurologic: Cranial nerves 2-12 intact, no focal weakness or numbness  Psychiatric: Alert, Ox3, mildly tremulous    Data   Data reviewed today:  No EKG seen    Recent Labs  Lab 09/29/18  0550   WBC 6.1   HGB 10.1*   MCV 74*      INR 1.10      POTASSIUM 3.4   CHLORIDE 106   CO2 23   BUN 16   CR 0.79   ANIONGAP 13   KEV 8.1*   GLC 71   ALBUMIN 3.2*   PROTTOTAL 7.2   BILITOTAL 1.0   ALKPHOS 332*   ALT 60   *   LIPASE 585*       Imaging:  Recent Results (from the past 24 hour(s))   CT Head w/o Contrast    Narrative    CT OF THE HEAD WITHOUT CONTRAST 9/29/2018 7:23 AM     COMPARISON: Brain MRI 4/28/2018.    HISTORY:  History of subdural hematoma, fall.     TECHNIQUE: 5 mm thick axial CT images of the head were acquired  without IV contrast material.    FINDINGS:  There is mild diffuse cerebral volume loss. There are  subtle patchy areas of decreased density in the cerebral white matter  bilaterally that are consistent with sequela of chronic small vessel  ischemic disease.     The ventricles and basal  cisterns are within normal limits in  configuration given the degree of cerebral volume loss.  There is no  midline shift. There are no extra-axial fluid collections.     No intracranial hemorrhage, mass or recent infarct.    The visualized paranasal sinuses are well-aerated. There is no  mastoiditis. There are no fractures of the visualized bones.       Impression    IMPRESSION:  Diffuse cerebral volume loss and cerebral white matter  changes consistent with chronic small vessel ischemic disease. No  evidence for acute intracranial pathology. This represents interval  resolution of the right cerebral convexity subdural collection noted  on the comparison MRI.    Radiation dose for this scan was reduced using automated exposure  control, adjustment of the mA and/or kV according to patient size, or  iterative reconstruction technique.   CT Abdomen Pelvis w Contrast    Narrative    Exam: CT ABDOMEN PELVIS W CONTRAST  9/29/2018 7:23 AM    History: Fall with pain over the RIGHT upper quadrant.    Comparison: 10/7/2016    Technique: Volumetric acquisition with reconstruction in the axial,  coronal planes through the abdomen and pelvis with contrast. Radiation  dose for this scan was reduced using automated exposure control,  adjustment of the mA and/or kV according to patient size, or iterative  reconstruction technique.    Contrast: 95 mL Isovue-370    Findings:   Lung Bases: Very mild peripheral opacities in the posterior RIGHT  lower lobe. Lung bases are otherwise clear. No pleural or pericardial  effusion.    Abdomen: Hepatic steatosis, liver otherwise appears normal. Spleen,  adrenal glands, kidneys, pancreas and gallbladder appear normal.    No areas of bowel wall thickening or bowel dilatation. No free fluid.  No abdominal or pelvic lymphadenopathy. Pelvic structures are  unremarkable. Atherosclerotic calcifications of the abdominal aorta  and its branch vessels without aneurysmal dilatation.    Bones: Mildly  displaced acute fractures involving the RIGHT seventh,  eighth and ninth ribs as well as chronic fractures involving multiple  ribs on both sides. There may be additional rib fractures more  superiorly, but these are not included in this study.      Impression    Impression:   1. Mildly displaced acute fractures involving the RIGHT seventh,  eighth and ninth ribs. Additional rib fractures may be present, but  are not included in this study. Mild underlying opacities in the RIGHT  lower lobe may represent mild pulmonary contusion.  2. Hepatic steatosis.    VIVIAN SHELDON MD   Ribs XR, unilat 3 views + PA chest, right    Narrative    RIGHT RIBS AND CHEST THREE VIEWS  9/29/2018 8:01 AM     COMPARISON:  Two-view chest x-ray 3/21/2019.    HISTORY: fall rib fractures;     FINDINGS: There are nondisplaced fractures of the right fifth and  eighth ribs posteriorly and moderately displaced fractures of the  right sixth and seventh ribs posteriorly. No other rib fractures  noted. The cardiac silhouette, pulmonary vasculature, lungs and  pleural spaces are within normal limits.      Impression    IMPRESSION: Clear lungs. Right rib fractures as detailed above.

## 2018-09-29 NOTE — ED TRIAGE NOTES
Pt arrives in the ED via EMS in a wheelchair. Pt reports to drinking x3 weeks.  He reports to vomiting blood x2 within the past 3 hours. He reports his last drink was at 00:00.  Denies sz activity with ETOH withdrawal.  He has not taking his prescribed meds.

## 2018-09-29 NOTE — CONSULTS
[facility]  Gastroenterology Consultation         Jim Richard  [address] [id number]  63 year old male    Admission Date/Time: 9/29/2018  Primary Care Provider: Talon Elias  Referring / Attending Physician: Dr. Burns    We were asked to see the patient in consultation by Dr. Burns for evaluation of GI bleed small amount of hematemesis and melena.      CC: Hematemesis    HPI:  Jim Richard is a 63 year old male who who was admitted via ER this morning patient has been drinking heavily drinks about 0.75 letter of hard liquor every day patient has history of alcohol withdrawal history of alcohol drinking problem patient has known history of variceal bleeding in the past.  Patient was complaining of black stools yesterday and this morning patient noticed small amount of blood in his vomitus.  Patient denied any history of change in mental status with above-mentioned symptom patient has been complaining of severe pain on the left side of the chest patient fall patient has a fracture of his ribs.  Patient has been drinking heavily for last 3 weeks.  Patient is significantly anxious patient is in withdrawal patient is tremulous shaky hyper and also somewhat tachycardic.  Patient is significantly tender on the lateral left side of his chest wall.  Patient denied any history of fever chills cough or any other significant systemic complaints.  Awake    ROS: A comprehensive ten point review of systems was negative aside from those in mentioned in the HPI.      PAST MED HX:  I have reviewed this patient's medical history and updated it with pertinent information if needed.   Past Medical History:   Diagnosis Date     Alcoholism (H) 1/14/2013    had treatment at [facility]     Anxiety      Coronary artery disease 09/09/2014    Nuclear stress test with slight fixed defect inferiorly, no ischemia     Depression     w/anxiety     Esophageal varices with bleeding  04/28/2018    6 varices banded on EGD     Heart attack      Hepatomegaly     Fatty liver, chronic alcoholic     Hyperlipemia      Hypertension      JANIS on CPAP      Peripheral vascular disease (H)      PVD (peripheral vascular disease) (H)      SDH (subdural hematoma) (H) 04/26/2018    Right side, resolved spontaneously     Spinal stenosis        MEDICATIONS:   Prior to Admission Medications   Prescriptions Last Dose Informant Patient Reported? Taking?   ATORVASTATIN CALCIUM PO 9/28/2018 at Unknown time Self Yes Yes   Sig: Take 10 mg by mouth At Bedtime    DULoxetine (CYMBALTA) 60 MG EC capsule 9/28/2018 at Unknown time Self No Yes   Sig: Take 1 capsule (60 mg) by mouth daily   MIRTAZAPINE PO 9/28/2018 at Unknown time Self Yes Yes   Sig: Take 30 mg by mouth At Bedtime   QUETIAPINE FUMARATE PO PRN Self Yes Yes   Sig: Take 50 mg by mouth 3 times daily as needed   TAMSULOSIN HCL PO 9/28/2018 at Unknown time Self Yes Yes   Sig: Take 0.4 mg by mouth daily   TRAZODONE HCL PO PRN Self Yes Yes   Sig: Take 100 mg by mouth nightly as needed (Takes 2 x 100mg for a total of 200mg at bedtime)   albuterol (PROAIR HFA/PROVENTIL HFA/VENTOLIN HFA) 108 (90 BASE) MCG/ACT Inhaler PRn Self Yes Yes   Sig: Inhale 2 puffs into the lungs every 6 hours as needed    fluticasone-vilanterol (BREO ELLIPTA) 200-25 MCG/INH oral inhaler PRN Self Yes Yes   Sig: Inhale 1 puff into the lungs daily as needed    hydrOXYzine (ATARAX) 25 MG tablet PRN at Unknown time Self No Yes   Sig: Take 2 tablets (50 mg) by mouth nightly as needed (sleep)   methocarbamol (ROBAXIN) 750 MG tablet PRN at Unknown time Self No Yes   Sig: Take 1 tablet (750 mg) by mouth 3 times daily as needed for muscle spasms   omeprazole (PRILOSEC) 40 MG capsule Past Month at Unknown time Self No Yes   Sig: Take 1 capsule (40 mg) by mouth 2 times daily (before meals) Take 30-60 minutes before a meal.   spironolactone-hydrochlorothiazide (ALDACTAZIDE) 25-25 MG per tablet 9/28/2018 at  Unknown time Self Yes Yes   Sig: Take 1 tablet by mouth daily      Facility-Administered Medications: None       ALLERGIES: No Known Allergies    SOCIAL HISTORY:  Social History   Substance Use Topics     Smoking status: Current Every Day Smoker     Packs/day: 1.00     Types: Cigarettes     Smokeless tobacco: Never Used      Comment: Chantix caused nightmares     Alcohol use Yes      Comment: 3/4th liter of vodka daily       FAMILY HISTORY:  Family History   Problem Relation Age of Onset     Mental Illness Son      Coronary Artery Disease Early Onset Mother      Substance Abuse Father      Lung Cancer Father      Substance Abuse Brother      Unknown/Adopted No family hx of      Depression No family hx of      Anxiety Disorder No family hx of      Schizophrenia No family hx of      Bipolar Disorder No family hx of      Suicide No family hx of      Dementia No family hx of      Winn Disease No family hx of      Parkinsonism No family hx of      Autism Spectrum Disorder No family hx of      Intellectual Disability (Mental Retardation) No family hx of        PHYSICAL EXAM:   General alert tremulous restless shaky  Vital Signs with Ranges  Temp: 97.6  F (36.4  C) Temp src: Oral BP: 140/74 Pulse: 86 Heart Rate: 87 Resp: 22 SpO2: 97 % O2 Device: Nasal cannula Oxygen Delivery: 3 LPM       Constitutional: healthy, alert and no distress   Cardiovascular: negative, PMI normal. No lifts, heaves, or thrills. RRR. No murmurs, clicks gallops or rub  Respiratory: negative, Percussion normal. Good diaphragmatic excursion. Lungs clear  Head: Normocephalic. No masses, lesions, tenderness or abnormalities  Neck: Neck supple. No adenopathy. Thyroid symmetric, normal size,, Carotids without bruits.  Abdomen: Abdomen soft, non-tender. BS normal. No masses, organomegaly  SKIN: no suspicious lesions or rashes          ADDITIONAL COMMENTS:   I reviewed the patient's new clinical lab test results.   Recent Labs   Lab Test  09/29/18    0550  05/21/18 2247 05/02/18   0635  04/30/18   0612   WBC  6.1  6.1  5.0  5.3   HGB  10.1*  8.2*  7.4*  8.1*   MCV  74*  78  84  85   PLT  207  224  131*  134*   INR  1.10  1.08   --   1.13     Recent Labs   Lab Test  09/29/18   0550  05/21/18 2247  05/02/18   0635   POTASSIUM  3.4  3.6  3.5   CHLORIDE  106  107  105   CO2  23  27  24   BUN  16  15  12   ANIONGAP  13  7  8     Recent Labs   Lab Test  09/29/18   0550  05/21/18 2247 05/02/18   0635   03/21/18   1555   09/25/14   0510   06/12/12   0550   ALBUMIN  3.2*  3.2*  2.5*   < >  2.9*   < >   --    < >   --    BILITOTAL  1.0  0.3  0.7   < >  0.9   < >   --    < >   --    ALT  60  23  40   < >  74*   < >   --    < >   --    AST  140*  42  53*   < >  136*   < >   --    < >   --    PROTEIN   --    --    --    --    --    --   Negative   --   Negative   LIPASE  585*   --    --    --   316   --    --    --    --     < > = values in this interval not displayed.       I reviewed the patient's new imaging results.        CONSULTATION ASSESSMENT AND PLAN:    Principal Problem:      Alcoholic hepatitis, alcohol withdrawal, significant alcoholic toxicity and upper GI bleed.  63-year-old gentleman with known history of heavy alcohol consumption history of upper GI bleed with variceal bleed with recent fall about 3 weeks ago with left-sided rib fractures and heavy alcohol consumption with 0.3 alcohol level currently going through withdrawal and episode of vomiting this morning with small amount of blood patient findings are worrisome for possible recurrent portal hypertension and variceal bleed however symptoms are less suggestive of variceal bleed more of gastritis and peptic ulcer disease.  Patient has anemia.  I will recommend him to be evaluated with urgent upper GI endoscopy and variceal treatment if needed.  Continue on IV Protonix IV octreotide.  Continue on serial monitoring of hemoglobin n.p.o.  I do believe patient is currently going through withdrawal  patient is at significantly high risk for DTs and alcohol withdrawal problem along with significant pain due to his rib fracture and known history of chronic pain syndrome given current findings patient will need close monitoring and management of his pain medicine along with alcohol withdrawal problem.  Thank you very much for letting me participate in his care I will continue to follow him closely from GI standpoint.          Rosendo Shaw MD, FACP  [facility].  Office: [phone]  Cell : [phone]

## 2018-09-29 NOTE — IP AVS SNAPSHOT
` [place] [id number] ONCOLOGY: [phone]            Medication Administration Report for Jim Richard as of 10/05/18 1824   Legend:    Given Hold Not Given Due Canceled Entry Other Actions    Time Time (Time) Time  Time-Action       Inactive    Active    Linked        Medications 09/29/18 09/30/18 10/01/18 10/02/18 10/03/18 10/04/18 10/05/18    acetaminophen (TYLENOL) tablet 650 mg  Dose: 650 mg  Freq: EVERY 4 HOURS PRN Route: PO  PRN Reason: mild pain  Start: 09/29/18 1026   Admin Instructions: Alternate ibuprofen (if ordered) with acetaminophen.  Maximum acetaminophen dose from all sources = 75 mg/kg/day not to exceed 4 grams/day.    Admin. Amount: 2 tablet (2 × 325 mg tablet)  Last Admin: 10/05/18 0336  Dispense Loc: [facility]       2340 (650 mg)-Given         0011 (650 mg)-Given [C]       1947 (650 mg)-Given        0411 (650 mg)-Given       0815 (650 mg)-Given       1928 (650 mg)-Given        0336 (650 mg)-Given           albuterol (PROAIR HFA/PROVENTIL HFA/VENTOLIN HFA) 108 (90 Base) MCG/ACT inhaler 2 puff  Dose: 2 puff  Freq: EVERY 4 HOURS PRN Route: IN  PRN Reason: shortness of breath / dyspnea  Start: 09/29/18 1445   Admin. Amount: 2 puff  Dispense Loc:  Main Pharmacy               atorvastatin (LIPITOR) tablet 10 mg  Dose: 10 mg  Freq: AT BEDTIME Route: PO  Start: 09/30/18 2200   Admin. Amount: 1 tablet (1 × 10 mg tablet)  Last Admin: 10/04/18 2121  Dispense Loc: [facility]      2231 (10 mg)-Given        2129 (10 mg)-Given        2201 (10 mg)-Given        2104 (10 mg)-Given        2121 (10 mg)-Given        [ ] 2200           bacitracin ointment  Freq: EVERY 1 HOUR PRN Route: Top  PRN Reason: other  PRN Comment: topical dermatitis  Start: 09/30/18 2228   Admin Instructions: Apply to affected area    Last Admin: 09/30/18 2347  Dispense Loc: [facility]      2347 ( )-Given                diazepam (VALIUM) tablet 10 mg  Dose: 10 mg  Freq: EVERY 30 MIN PRN Route: PO  PRN Reason: other  PRN  Comment: per CIWA-Ar score  Start: 09/29/18 0858   Admin Instructions: Oral dosing is the preferred route of administration.    Dose according to CIWA-Ar Score:    For CIWA-Ar Score LESS THAN OR EQUAL TO 7:  ~ NO diazepam (VALIUM) is to be given and   ~ repeat CIWA-Ar scale in 4 hours and PRN if symptomatic    For CIWA-Ar Score 8-12:  ~ give diazepam (VALIUM) 10 mg PO or 5 mg IV and  ~ repeat CIWA-Ar scale in 2 hours     For CIWA-Ar Score 13-15:  ~ give diazepam (VALIUM)10 mg PO or 5 mg IV and  ~ repeat CIWA-Ar scale in 1 hour     For CIWA-Ar Score GREATER THAN OR EQUAL TO 16:  ~ give diazepam (VALIUM) 10mg PO or 10 mg IV and  ~ repeat CIWA-Ar scale in 30 minutes     Doses should be withheld for nystagmus, sedation, ataxia, dysarthria or respiratory rate less than 12. If dose is being held more than once, provider must be notified.    Admin. Amount: 4 half-tab (4 × 2.5 mg half-tab)  Last Admin: 10/05/18 0450  Dispense Loc:  [facility]      0402 (10 mg)-Given       1725 (10 mg)-Given       1935 (10 mg)-Given        0024 (10 mg)-Given       (0629)-Not Given [C]       1528 (10 mg)-Given        0816 (10 mg)-Given          0450 (10 mg)-Given          Or  diazepam (VALIUM) injection 5-10 mg  Dose: 5-10 mg  Freq: EVERY 30 MIN PRN Route: IV  PRN Reason: other  PRN Comment: per CIWA-Ar score  Start: 09/29/18 0858   Admin Instructions: Oral dosing is the preferred route of administration.    Dose according to CIWA-Ar Score:    For CIWA-Ar Score LESS THAN OR EQUAL TO 7:  ~ NO diazepam (VALIUM) is to be given and   ~ repeat CIWA-Ar scale in 4 hours and PRN if symptomatic    For CIWA-Ar Score 8-12:  ~ give diazepam (VALIUM) 10 mg PO or 5 mg IV and  ~ repeat CIWA-Ar scale in 2 hours     For CIWA-Ar Score 13-15:  ~ give diazepam (VALIUM)10 mg PO or 5 mg IV and  ~ repeat CIWA-Ar scale in 1 hour     For CIWA-Ar Score GREATER THAN OR EQUAL TO 16:  ~ give diazepam (VALIUM) 10mg PO or 10 mg IV and  ~ repeat CIWA-Ar scale in 30 minutes      Doses should be withheld for nystagmus, sedation, ataxia, dysarthria or respiratory rate less than 12. If dose is being held more than once, provider must be notified.  Vesicant. For ordered doses up to 20 mg, give IV Push undiluted. Administer each 5mg over 1 minute. See IV push link for additional instructions.    Admin. Amount: 5-10 mg = 1-2 mL Conc: 5 mg/mL  Dispense Loc: Contact Rx for dose  Infused Over: 1-4 Minutes  Volume: 2 mL                                                                       DULoxetine (CYMBALTA) EC capsule 60 mg  Dose: 60 mg  Freq: DAILY Route: PO  Start: 09/30/18 0900   Admin. Amount: 1 capsule (1 × 60 mg capsule)  Last Admin: 10/05/18 0827  Dispense Loc:  [facility]      0828 (60 mg)-Given        0903 (60 mg)-Given        0816 (60 mg)-Given        0942 (60 mg)-Given        0800 (60 mg)-Given        0827 (60 mg)-Given           fluticasone-vilanterol (BREO ELLIPTA) 200-25 MCG/INH inhaler 1 puff  Dose: 1 puff  Freq: DAILY Route: IN  Start: 09/29/18 1500   Admin Instructions: *Do not use more frequently than once daily.*  Rinse mouth after use.    Admin. Amount: 1 puff  Last Admin: 10/05/18 0826  Dispense Loc:  Main Pharmacy     1558 (1 puff)-Given        0828 (1 puff)-Given        0946 (1 puff)-Given        0819 (1 puff)-Given        (0945)-Not Given        0800 (1 puff)-Given        0826 (1 puff)-Given           folic acid (FOLVITE) tablet 1 mg  Dose: 1 mg  Freq: DAILY Route: PO  Start: 09/29/18 0900   Admin Instructions: If patient is unable to tolerate oral folic acid, an intravenous dose of folic acid should be considered.  Contact provider to change to IV if patient unable to take PO.    Admin. Amount: 1 tablet (1 × 1 mg tablet)  Last Admin: 10/05/18 0827  Dispense Loc:  [facility]     1257 (1 mg)-Given [C]        0828 (1 mg)-Given        0903 (1 mg)-Given        0816 (1 mg)-Given        0942 (1 mg)-Given        0800 (1 mg)-Given        0827 (1 mg)-Given           hydrOXYzine  (ATARAX) tablet 50 mg  Dose: 50 mg  Freq: AT BEDTIME PRN Route: PO  PRN Comment: sleep  Start: 09/29/18 1446   Admin Instructions: If the patient has multiple insomnia agents ordered PRN, offer in the following order per policy.  Move to the next available step if the earlier step is ineffective.    Step 1 - melatonin; Step 2 - hydroxyzine; Step 3 - temazepam; Step 4 - zolpidem; Step 5 - trazodone.    Admin. Amount: 2 tablet (2 × 25 mg tablet)  Last Admin: 10/04/18 1937  Dispense Loc:  [facility]      0355 (50 mg)-Given           1937 (50 mg)-Given            magnesium sulfate 4 g in 100 mL sterile water (premade)  Dose: 4 g  Freq: EVERY 4 HOURS PRN Route: IV  PRN Reason: magnesium supplementation  Start: 10/02/18 0809   Admin Instructions: For serum Mg++ less than 1.6 mg/dL  Give 4 g and recheck magnesium level 2 hours after dose, and next AM.    Admin. Amount: 4 g = 100 mL Conc: 4 g/100 mL  Last Admin: 10/02/18 1057  Dispense Loc: Contact Rx for dose  Infused Over: 120 Minutes  Volume: 100 mL        1057 (4 g)-New Bag              melatonin tablet 1 mg  Dose: 1 mg  Freq: AT BEDTIME PRN Route: PO  PRN Reason: sleep  Start: 09/29/18 1026   Admin Instructions: Do not give unless at least 6 hours of uninterrupted sleep is expected. If the patient has multiple insomnia agents ordered PRN, offer in the following order per policy.  Move to the next available step if the earlier step is ineffective.    Step 1 - melatonin; Step 2 - hydroxyzine; Step 3 - temazepam; Step 4 - zolpidem; Step 5 - trazodone.    Admin. Amount: 1 tablet (1 × 1 mg tablet)  Last Admin: 10/04/18 0021  Dispense Loc:  [facility]      0828 (1 mg)-Given           0021 (1 mg)-Given            methocarbamol (ROBAXIN) tablet 750 mg  Dose: 750 mg  Freq: 3 TIMES DAILY PRN Route: PO  PRN Reason: muscle spasms  Start: 09/29/18 1446   Admin. Amount: 1 tablet (1 × 750 mg tablet)  Last Admin: 10/04/18 9512  Dispense Loc:  [facility]          0411 (750 mg)-Given        2349 (750 mg)-Given            mirtazapine (REMERON) tablet 30 mg  Dose: 30 mg  Freq: AT BEDTIME Route: PO  Start: 09/29/18 2200   Admin. Amount: 2 tablet (2 × 15 mg tablet)  Last Admin: 10/04/18 2121  Dispense Loc: [facility]     2135 (30 mg)-Given        2231 (30 mg)-Given        2129 (30 mg)-Given        2201 (30 mg)-Given        2104 (30 mg)-Given        2121 (30 mg)-Given        [ ] 2200           multivitamin, therapeutic with minerals (THERA-VIT-M) tablet 1 tablet  Dose: 1 tablet  Freq: DAILY Route: PO  Start: 09/29/18 0900   Admin Instructions: If patient is unable to tolerate oral multivitamins an intravenous dose of multivitamins should be considered.  Contact provider to change to IV if patient unable to take PO.    Admin. Amount: 1 tablet  Last Admin: 10/05/18 0827  Dispense Loc: [place] [id number]     1257 (1 tablet)-Given [C]        0828 (1 tablet)-Given        0903 (1 tablet)-Given        0816 (1 tablet)-Given        0942 (1 tablet)-Given        0759 (1 tablet)-Given        0827 (1 tablet)-Given           naloxone (NARCAN) injection 0.1-0.4 mg  Dose: 0.1-0.4 mg  Freq: EVERY 2 MIN PRN Route: IV  PRN Reason: opioid reversal  Start: 09/29/18 1026   Admin Instructions: For respiratory rate LESS than or EQUAL to 8.  Partial reversal dose:  0.1 mg titrated q 2 minutes for Analgesia Side Effects Monitoring Sedation Level of 3 (frequently drowsy, arousable, drifts to sleep during conversation).Full reversal dose:  0.4 mg bolus for Analgesia Side Effects Monitoring Sedation Level of 4 (somnolent, minimal or no response to stimulation).  For ordered IV doses 0.1-2mg give IVP. Give each 0.4mg over 15 seconds in emergency situations. For non-emergent situations further dilute in 9mL of NS to facilitate titration of response.    Admin. Amount: 0.1-0.4 mg = 0.25-1 mL Conc: 0.4 mg/mL  Dispense Loc: [facility] 88[facility]  Volume: 1 mL               nicotine (NICODERM CQ) 21 MG/24HR 24 hr patch 1 patch  Dose: 1 patch  Freq: DAILY  Route: TD  Start: 09/29/18 0929   Admin. Amount: 1 patch  Last Admin: 10/05/18 0826  Dispense Loc:  [facility]     1256 (1 patch)-Given [C]        0827 (1 patch)-Given        0904 (1 patch)-Given        0817 (1 patch)-Given        0943 (1 patch)-Given        0756 (1 patch)-Given        0826 (1 patch)-Given           nicotine Patch in Place  Freq: DAILY PRN Route: TD  PRN Comment: smoker  Start: 09/29/18 0928   Admin Instructions: Chart every shift, confirming that patch is still in place on patient (no barcode scan needed). See patch order for dose information.    Dispense Loc:  Main Pharmacy               nicotine patch REMOVAL  Freq: DAILY Route: TD  Start: 09/30/18 0800   Admin Instructions: Remove patch when new patch is applied or patch is discontinued.    Dispense Loc:  Main Pharmacy      0829 ( )-Patch Removed        0904 ( )-Patch Removed        0817 ( )-Patch Removed        0943 ( )-Patch Removed        0759 ( )-Patch Removed        0827 ( )-Patch Removed           omeprazole (priLOSEC) CR capsule 40 mg  Dose: 40 mg  Freq: 2 TIMES DAILY BEFORE MEALS Route: PO  Start: 09/30/18 0845   Admin. Amount: 2 capsule (2 × 20 mg capsule)  Last Admin: 10/05/18 1700  Dispense Loc:  [facility]      1012 (40 mg)-Given       1707 (40 mg)-Given        0903 (40 mg)-Given       1626 (40 mg)-Given        0643 (40 mg)-Given       1614 (40 mg)-Given        0942 (40 mg)-Given       1618 (40 mg)-Given        0759 (40 mg)-Given       1702 (40 mg)-Given        0645 (40 mg)-Given       1700 (40 mg)-Given           ondansetron (ZOFRAN-ODT) ODT tab 4 mg  Dose: 4 mg  Freq: EVERY 6 HOURS PRN Route: PO  PRN Reasons: nausea,vomiting  Start: 09/29/18 1026   Admin Instructions: This is Step 1 of nausea and vomiting management.  If nausea not resolved in 15 minutes, go to Step 2 prochlorperazine (COMPAZINE). Do not push through foil backing. Peel back foil and gently remove. Place on tongue immediately. Administration with liquid  unnecessary  With dry hands, peel back foil backing and gently remove tablet; do not push oral disintegrating tablet through foil backing; administer immediately on tongue and oral disintegrating tablet dissolves in seconds; then swallow with saliva; liquid not required.    Admin. Amount: 1 tablet (1 × 4 mg tablet)  Dispense Loc:  [facility]              Or  ondansetron (ZOFRAN) injection 4 mg  Dose: 4 mg  Freq: EVERY 6 HOURS PRN Route: IV  PRN Reasons: nausea,vomiting  Start: 09/29/18 1026   Admin Instructions: This is Step 1 of nausea and vomiting management.  If nausea not resolved in 15 minutes, go to Step 2 prochlorperazine (COMPAZINE).  Irritant. For ordered IV doses 0.1-4 mg, give IV Push undiluted over 2-5 minutes.    Admin. Amount: 4 mg = 2 mL Conc: 4 mg/2 mL  Dispense Loc:  [facility]  Infused Over: 2-5 Minutes  Volume: 2 mL               oxyCODONE IR (ROXICODONE) tablet 5 mg  Dose: 5 mg  Freq: 4 TIMES DAILY PRN Route: PO  PRN Reason: other  PRN Comment: pain control or improvement in physical function. Hold dose for analgesic side effects.  Start: 10/01/18 1630   Admin Instructions: Start with the lowest dose.  May adjust dose by 5 mg every 3 hours as needed. Notify provider to assess for uncontrolled pain or analgesic side effects. Hold while on PCA or with regular IV opioid dosing.    Admin. Amount: 1 tablet (1 × 5 mg tablet)  Last Admin: 10/05/18 0827  Dispense Loc:  [facility]       1951 (5 mg)-Given        0218 (5 mg)-Given       1107 (5 mg)-Given       1720 (5 mg)-Given        0946 (5 mg)-Given       1915 (5 mg)-Given       2319 (5 mg)-Given        0815 (5 mg)-Given       1702 (5 mg)-Given       2121 (5 mg)-Given        0131 (5 mg)-Given       0827 (5 mg)-Given           polyethylene glycol (MIRALAX/GLYCOLAX) Packet 17 g  Dose: 17 g  Freq: 2 TIMES DAILY PRN Route: PO  PRN Comment: constipation  Start: 10/03/18 1555   Admin Instructions: 1 Packet = 17 grams. Mixed prescribed dose in 8 ounces of water.  Follow with 8 oz. of water.    Admin. Amount: 17 g  Dispense Loc:  [facility]               potassium chloride (KLOR-CON) Packet 20-40 mEq  Dose: 20-40 mEq  Freq: EVERY 2 HOURS PRN Route: ORAL OR FEED  PRN Reason: potassium supplementation  Start: 10/02/18 0809   Admin Instructions: Use if unable to tolerate tablets.    If Serum K+ 3.4-4.0, dose = 20 mEq x1. Recheck K+ level the next AM.  If Serum K+ 3.0-3.3, dose = 60 mEq po total dose (40 mEq x 1 followed in 2 hours by 20 mEq X1). Recheck K+ level 4 hours after dose and the next AM.  If Serum K+ 2.5-2.9, dose = 80 mEq po total dose (40 mEq Q2H x2). Recheck K+ level 4 hours after dose and the next AM.  If Serum K+ less than 2.5, See IV order.  Dissolve packet contents in 4-8 ounces of cold water or juice.    Admin. Amount: 20-40 mEq  Dispense Loc:  [facility]               potassium chloride 10 mEq in 100 mL intermittent infusion with 10 mg lidocaine  Dose: 10 mEq  Freq: EVERY 1 HOUR PRN Route: IV  PRN Reason: potassium supplementation  Start: 10/02/18 0809   Admin Instructions: Infuse via PERIPHERAL LINE. Use potassium with lidocaine for pain with peripheral administration.  If Serum K+ 3.4-4.0, dose = 10 mEq/hr x2 doses. Recheck K+ level the next AM.  If Serum K+ 3.0-3.3, dose = 10 mEq/hr x4 doses (40 mEq IV total dose). Recheck K+ level 2 hours after dose and the next AM.  If Serum K+ less than 3.0, dose = 10 mEq/hr x6 doses (60 mEq IV total dose). Recheck K+ level 2 hours after dose and the next AM.    Admin. Amount: 10 mEq = 100 mL Conc: 10 mEq/100 mL  Dispense Loc: Contact Rx for dose  Infused Over: 1 Hours  Volume: 100 mL               potassium chloride 10 mEq in 100 mL sterile water intermittent infusion (premix)  Dose: 10 mEq  Freq: EVERY 1 HOUR PRN Route: IV  PRN Reason: potassium supplementation  Start: 10/02/18 0809   Admin Instructions: Infuse via PERIPHERAL LINE or CENTRAL LINE. Use for central line replacement if patient weight less than 65 kg, if  patient is on TPN with high potassium content or if unit does not stock 20 mEq bags.  If Serum K+ 3.4-4.0, dose = 10 mEq/hr x2 doses. Recheck K+ level the next AM.  If Serum K+ 3.0-3.3, dose = 10 mEq/hr x4 doses (40 mEq IV total dose). Recheck K+ level 2 hours after dose and the next AM.  If Serum K+ less than 3.0, dose = 10 mEq/hr x6 doses (60 mEq IV total dose). Recheck K+ level 2 hours after dose and the next AM.    Admin. Amount: 10 mEq = 100 mL Conc: 10 mEq/100 mL  Dispense Loc: Contact Rx for dose  Infused Over: 60 Minutes  Volume: 100 mL               potassium chloride 20 mEq in 50 mL intermittent infusion  Dose: 20 mEq  Freq: EVERY 1 HOUR PRN Route: IV  PRN Reason: potassium supplementation  Start: 10/02/18 0809   Admin Instructions: Infuse via CENTRAL LINE Only.  May need EKG if less than 65 kg or on TPN - Max rate is 0.3 mEq/kg/hr for patients not on EKG monitoring.    If Serum K+ 3.4-4.0, dose = 20 mEq/hr x1 doses. Recheck K+ level the next AM.  If Serum K+ 3.0-3.3, dose = 20 mEq/hr x2 doses (40 mEq IV total dose).  Recheck K+ level 2 hours after dose and the next AM.  If Serum K+ less than 3.0, dose = 20 mEq/hr x3 doses (60 mEq IV total dose). Recheck K+ level 2 hours after dose and the next AM.    Admin. Amount: 20 mEq = 50 mL Conc: 20 mEq/50 mL  Dispense Loc: Contact Rx for dose  Volume: 50 mL               potassium chloride SA (K-DUR/KLOR-CON M) CR tablet 20-40 mEq  Dose: 20-40 mEq  Freq: EVERY 2 HOURS PRN Route: PO  PRN Reason: potassium supplementation  Start: 10/02/18 0809   Admin Instructions: Use if able to take PO.   If Serum K+ 3.4-4.0, dose = 20 mEq x1. Recheck K+ level the next AM.  If Serum K+ 3.0-3.3, dose = 60 mEq po total dose (40 mEq x1 followed in 2 hours by 20 mEq x1). Recheck K+ level 4 hours after dose and the next AM.  If Serum K+ 2.5-2.9, dose = 80 mEq po total dose (40 mEq Q2H x2). Recheck K+ level 4 hours after dose and the next AM.  If Serum K+ less than 2.5, See IV order.  DO  NOT CRUSH    Admin. Amount: 1-2 tablet (1-2 × 20 mEq tablet)  Last Admin: 10/04/18 1114  Dispense Loc:  [facility]        1058 (40 mEq)-Given       1345 (20 mEq)-Given        1618 (20 mEq)-Given        1114 (20 mEq)-Given            potassium phosphate 10 mmol in D5W 250 mL intermittent infusion  Dose: 10 mmol  Freq: DAILY PRN Route: IV  PRN Reason: phosphorous supplementation  Start: 10/02/18 0809   Admin Instructions: For serum phosphorus level 2.5-2.7  Do not infuse Phosphorus in the same line as TPN.   Give 10 mmol and recheck phosphorus level the next AM.  Each mmol of phosphate provides 1.47 mEq of Potassium. Multiply the patient's phosphate dose by 1.47 to determine the amount of potassium in this dose.    Admin. Amount: 10 mmol  Dispense Loc:  Main Pharmacy  Infused Over: 4 Hours  Volume: 250 mL   Mixture Administration Information:   Medication Type Amount   potassium phosphate 3 MMOLE/ML SOLN Medications 10 mmol   D5W 5 % SOLN Base 250 mL                       potassium phosphate 15 mmol in D5W 250 mL intermittent infusion  Dose: 15 mmol  Freq: DAILY PRN Route: IV  PRN Reason: phosphorous supplementation  Start: 10/02/18 0809   Admin Instructions: For serum phosphorus level 2.0-2.4  Do not infuse Phosphorus in the same line as TPN.   Give 15 mmol and recheck phosphorus level next AM.  Each mmol of phosphate provides 1.47 mEq of Potassium. Multiply the patient's phosphate dose by 1.47 to determine the amount of potassium in this dose.    Admin. Amount: 15 mmol  Last Admin: 10/02/18 1347  Dispense Loc:  Main Pharmacy  Infused Over: 4 Hours  Volume: 250 mL   Mixture Administration Information:   Medication Type Amount   potassium phosphate 3 MMOLE/ML SOLN Medications 15 mmol   D5W 5 % SOLN Base 250 mL                1347 (15 mmol)-New Bag              potassium phosphate 20 mmol in D5W 250 mL intermittent infusion  Dose: 20 mmol  Freq: EVERY 6 HOURS PRN Route: IV  PRN Reason: phosphorous  supplementation  Start: 10/02/18 0809   Admin Instructions: For serum phosphorus level 1.1-1.9  For CENTRAL Line ONLY  Do not infuse Phosphorus in the same line as TPN.   Give 20 mmol and recheck phosphorus level 2 hours after dose and next AM.  Each mmol of phosphate provides 1.47 mEq of Potassium. Multiply the patient's phosphate dose by 1.47 to determine the amount of potassium in this dose.    Admin. Amount: 20 mmol  Dispense Loc:  Main Pharmacy  Infused Over: 4 Hours  Volume: 250 mL   Mixture Administration Information:   Medication Type Amount   potassium phosphate 3 MMOLE/ML SOLN Medications 20 mmol   D5W 5 % SOLN Base 250 mL                       potassium phosphate 20 mmol in D5W 500 mL intermittent infusion  Dose: 20 mmol  Freq: EVERY 6 HOURS PRN Route: IV  PRN Reason: phosphorous supplementation  Start: 10/02/18 0809   Admin Instructions: For serum phosphorus level 1.1-1.9  For peripheral line  Do not infuse Phosphorus in the same line as TPN.   Give 20 mmol and recheck phosphorus level 2 hours after dose and next AM. Repeat if necessary.  Each mmol of phosphate provides 1.47 mEq of Potassium. Multiply the patient's phosphate dose by 1.47 to determine the amount of potassium in this dose.    Admin. Amount: 20 mmol  Dispense Loc:  Main Pharmacy  Infused Over: 4 Hours  Volume: 500 mL   Mixture Administration Information:   Medication Type Amount   potassium phosphate 3 MMOLE/ML SOLN Medications 20 mmol   D5W 5 % SOLN Base 500 mL                       potassium phosphate 25 mmol in D5W 500 mL intermittent infusion  Dose: 25 mmol  Freq: EVERY 8 HOURS PRN Route: IV  PRN Reason: phosphorous supplementation  Start: 10/02/18 0809   Admin Instructions: For serum phosphorus level less than 1.1  Do not infuse Phosphorus in the same line as TPN.   Give 25 mmol and recheck phosphorus level 2 hours after dose and next AM.  Each mmol of phosphate provides 1.47 mEq of Potassium. Multiply the patient's phosphate dose  by 1.47 to determine the amount of potassium in this dose.    Admin. Amount: 25 mmol  Dispense Loc:  Main Pharmacy  Infused Over: 6 Hours  Volume: 500 mL   Mixture Administration Information:   Medication Type Amount   potassium phosphate 3 MMOLE/ML SOLN Medications 25 mmol   D5W 5 % SOLN Base 500 mL                       QUEtiapine (SEROquel) tablet 25-50 mg  Dose: 25-50 mg  Freq: EVERY 6 HOURS PRN Route: PO  PRN Comment: Moderate agitation  Start: 09/29/18 0858   Admin Instructions: Start at lowest dose.    Admin. Amount: 1-2 tablet (1-2 × 25 mg tablet)  Dispense Loc: [facility]               QUEtiapine (SEROquel) tablet 50 mg  Dose: 50 mg  Freq: 3 TIMES DAILY PRN Route: PO  PRN Comment: anxiety  Start: 09/29/18 1445   Admin. Amount: 2 tablet (2 × 25 mg tablet)  Last Admin: 09/29/18 1638  Dispense Loc: [place] [id number]     1638 (50 mg)-Given                 spironolactone (ALDACTONE) tablet 25 mg  Dose: 25 mg  Freq: DAILY Route: PO  Start: 09/29/18 1545   Admin Instructions: Therapeutic interchange for Aldactazide 25mg/25mg.  Give with hydrochlorothiazide 25 mg    Admin. Amount: 1 tablet (1 × 25 mg tablet)  Last Admin: 10/05/18 0827  Dispense Loc: [facility]     1725 (25 mg)-Given        0828 (25 mg)-Given        0903 (25 mg)-Given        0816 (25 mg)-Given        0942 (25 mg)-Given        0800 (25 mg)-Given        0827 (25 mg)-Given          And  hydrochlorothiazide (HYDRODIURIL) tablet 25 mg  Dose: 25 mg  Freq: DAILY Route: PO  Start: 09/29/18 1545   Admin Instructions: Therapeutic interchange for Aldactazide 25mg/25mg.  Give with spironolactone 25 mg    Admin. Amount: 1 tablet (1 × 25 mg tablet)  Last Admin: 10/05/18 0827  Dispense Loc: [facility]     1558 (25 mg)-Given        0828 (25 mg)-Given        0903 (25 mg)-Given        0816 (25 mg)-Given        0942 (25 mg)-Given        0800 (25 mg)-Given        0827 (25 mg)-Given           tamsulosin (FLOMAX) capsule 0.8 mg  Dose: 0.8 mg  Freq: DAILY Route: PO  Start:  10/02/18 09   Admin. Amount: 2 capsule (2 × 0.4 mg capsule)  Last Admin: 10/05/18 0827  Dispense Loc: [facility]        0816 (0.8 mg)-Given        0942 (0.8 mg)-Given        0800 (0.8 mg)-Given        0827 (0.8 mg)-Given           traZODone (DESYREL) tablet 200 mg  Dose: 200 mg  Freq: AT BEDTIME PRN Route: PO  PRN Reasons: sleep,other  Start: 18 1451   Admin Instructions: If the patient has multiple insomnia agents ordered PRN, offer in the following order per policy.  Move to the next available step if the earlier step is ineffective.    Step 1 - melatonin; Step 2 - hydroxyzine; Step 3 - temazepam; Step 4 - zolpidem; Step 5 - trazodone.    Admin. Amount: 2 tablet (2 × 100 mg tablet)  Last Admin: 10/02/18 2207  Dispense Loc: [facility]     2331 (200 mg)-Given           (200 mg)-Given             Completed Medications  Medications 09/29/18 09/30/18 10/01/18 10/02/18 10/03/18 10/04/18 10/05/18         Dose: 0.5 mL  Freq: PRIOR TO DISCHARGE Route: IM  Start: 18 1000   End: 10/04/18 1114   Admin Instructions: Administer when afebrile (less than 100.4 F) x 24 hours, or Prior to Discharge. If not administering when scheduled , change the due time by following the instructions in the reference link below. If patient refuses vaccine, chart as Vaccine Refused.      Admin. Amount: 0.5 mL  Last Admin: 10/04/18 1114  Dispense Loc:  Main Pharmacy  Administrations Remainin  Volume: 0.5 mL          1114 (0.5 mL)-Given              Dose: 100 mg  Freq: DAILY Route: PO  Start: 18   End: 10/03/18 0942   Admin Instructions: Administer first dose as soon as order set is initiated unless given in ED.   If patient is unable to tolerate oral thiamine, an intravenous dose of thiamine should be considered.  Contact provider to change to IV if patient unable to take PO.    Admin. Amount: 1 tablet (1 × 100 mg tablet)  Last Admin: 10/03/18 0942  Dispense Loc:  [facility]  Administrations Remainin     1258  (100 mg)-Given [C]        0828 (100 mg)-Given        0903 (100 mg)-Given        0816 (100 mg)-Given        0942 (100 mg)-Given

## 2018-09-29 NOTE — ED NOTES
Pt having episodic periods of SpO2 ranging from 76-93% on RA while asleep. After speaking to pt he reports that he has sleep apnea and COPD. Typically uses CPAP when he sleeps at night. Pt placed on O2 via NC. Will monitor & titrate FiO2 appropriately.

## 2018-09-29 NOTE — IP AVS SNAPSHOT
"` [place] [address] ONCOLOGY: [phone]                                              INTERAGENCY TRANSFER FORM - NURSING   2018                    Hospital Admission Date: 2018  DEVONTE SEGURA   : 1955  Sex: Male        Attending Provider: Gray Burns MD     Allergies:  No Known Allergies    Infection:  None   Service:  HOSPITALIST    Ht:  1.702 m (5' 7\")   Wt:  --   Admission Wt:  --    BMI:  --   BSA:  --            Patient PCP Information     Provider PCP Type    FERNANDA BRANTLEY MD General      Current Code Status     Date Active Code Status Order ID Comments User Context       Prior      Code Status History     Date Active Date Inactive Code Status Order ID Comments User Context    10/4/2018  3:15 PM  DNR [id number]  Jude Duarte, DO Outpatient    2018 10:27 AM 10/4/2018  3:15 PM DNR/DNI [id number]  Gray Burns MD Inpatient    2018 11:50 AM 2018 10:27 AM DNR/DNI [id number]  Godfrey Vasques, DO Outpatient    2018 10:29 PM 2018 11:50 AM DNR/DNI [id number]  Raquel Chinchilla MD Inpatient    3/24/2018  9:58 AM 2018 10:29 PM Full Code [id number]  Piter Sequeira, DO Outpatient    3/21/2018  7:14 PM 3/24/2018  9:58 AM Full Code [id number]  Danya Grissom MD Inpatient    2017  4:03 AM 2017  5:39 PM Full Code [id number]  Merna Hawk, VASQUEZ Inpatient    2017  3:28 PM 2/10/2017  4:52 PM Full Code [id number]  Naomi Kay RN Inpatient    10/10/2016  3:46 PM 2017  3:28 PM DNR/DNI [id number]  Kelvin Mendez MD Outpatient    10/7/2016  9:04 AM 10/10/2016  3:46 PM DNR/DNI [id number]  Alena Byrd MD Inpatient    2015 12:38 PM 10/7/2016  9:04 AM Full Code [id number]  Kristi Robison MD Outpatient    2015 10:49 PM 2015 12:38 PM Full Code [id number]  Piter Sequeira DO Inpatient    2014  2:23 AM 2014  3:05 PM Full Code [id number]  Jessica Simpson " DAYANA, RN Inpatient    6/14/2014  7:07 PM 6/16/2014  6:03 PM Full Code [id number]  Jeni Palma RN Inpatient      Advance Directives        Scanned docmt in ACP Activity?           Yes, scanned ACP docmt        Hospital Problems as of 10/4/2018              Priority Class Noted POA    GI bleed Medium  3/21/2018 Yes    * (Principal)Hematemesis Medium  4/26/2018 Yes    Alcohol abuse Medium  9/29/2018 Unknown    Rib Fractures (right 7th, 8th, 9th) -- occured 9/9/2018 Medium  9/29/2018 Unknown    Alcoholic hepatitis without ascites Medium  9/29/2018 Unknown    Acute blood loss anemia Medium  9/29/2018 Unknown    Esophageal varices (EGD with 6 varices banded 4/27/18) Medium  9/29/2018 Unknown    Essential hypertension Medium  9/29/2018 Unknown    COPD (chronic obstructive pulmonary disease) (H) Medium  9/29/2018 Unknown    Smoker Medium  9/29/2018 Unknown    JANIS on CPAP Medium  9/29/2018 Unknown    Chronic low back pain Medium  9/29/2018 Unknown      Non-Hospital Problems as of 10/4/2018              Priority Class Noted    PAD (peripheral artery disease) (H) Medium  6/12/2012    MENTAL HEALTH Medium  6/14/2014    Anxiety and depression Medium  6/21/2014    Spinal stenosis, lumbar region, with neurogenic claudication Medium  9/22/2014    Alcohol withdrawal (H) Medium  1/12/2015    Fall (on) (from) other stairs and steps, initial encounter Medium  10/7/2016    Pain management Medium  10/8/2016    Chemical dependency (H) Medium  12/21/2016    Depression Medium  2/8/2017    Suicidal ideation Medium  12/21/2017      Immunizations     Name Date      Influenza Quad, Recombinant, p-free 10/04/18     Pneumococcal 23 valent 01/13/15          END      ASSESSMENT     Discharge Profile Flowsheet     EXPECTED DISCHARGE     Patient's communication style  spoken language (English or Bilingual) 09/29/18 7203    Expected Discharge Date  10/05/18 10/04/18 1514   FINAL RESOURCES      DISCHARGE NEEDS ASSESSMENT     Resources List  Skilled  "Nursing Facility 10/04/18 1443    Concerns To Be Addressed  discharge planning concerns 09/28/14 1136   Skilled Nursing Facility  [facility] [phone], [phone] 10/04/18 1445    Equipment Currently Used at Home  none 10/02/18 1138   PAS Number  [id number] ([id number]) 10/04/18 1528    Transportation Available  car (Pt drives at baseline.) 10/02/18 1138   SKIN      # of Referrals Placed by CTS  Transportation;Post Acute Facilities;Senior Linkage Line 10/04/18 1443   Inspection of bony prominences  Full 10/04/18 1531    [facility] Name  [facility] 05/04/18 1140   Inspection under devices  Full 10/03/18 1929    Primary Care MD Name  Dr. Elias 05/04/18 1140   Skin WDL  ex 10/04/18 1531    Equipment Used at Home  none 01/16/15 1456   Skin Color/Characteristics  bruised (ecchymotic) 10/04/18 1531    ASSESSMENT OF FUNCTIONAL STATUS     Skin Temperature  warm 10/04/18 0022    Assesssment of Functional Status  Needs placement in a SNF/TCF for rehabilitation 10/03/18 1432   Skin Moisture  dry 10/04/18 0022    GASTROINTESTINAL (ADULT,PEDIATRIC,OB)     Skin Elasticity  quick return to original state 10/04/18 0022    GI WDL  ex 10/04/18 0825   Skin Integrity  bruise(s) 10/04/18 1531    Abdominal Appearance  distended 10/04/18 0825   Full except areas not inspected   Hip, left;Hip, right;Buttock, left;Buttock, right;Sacrum;Coccyx 10/01/18 2043    Last Bowel Movement  10/03/18 10/04/18 0825   SAFETY      GI Signs/Symptoms  constipation 10/04/18 0022   Safety WDL  WDL 10/04/18 0825    Passing flatus  yes 10/04/18 0022   All Alarms  alarm(s) activated and audible 10/03/18 1929    COMMUNICATION ASSESSMENT                        Assessment WDL (Within Defined Limits) Definitions           Safety WDL     Effective: 09/28/15    Row Information: <b>WDL Definition:</b> Bed in low position, wheels locked; call light in reach; upper side rails up x 2; ID band on<br> <font color=\"gray\"><i>Item=AS " "safety wdl>>List=AS safety wdl>>Version=F14</i></font>      Skin WDL     Effective: 09/28/15    Row Information: <b>WDL Definition:</b> Warm; dry; intact; elastic; without discoloration; pressure points without redness<br> <font color=\"gray\"><i>Item=AS skin wdl>>List=AS skin wdl>>Version=F14</i></font>      Vitals     Vital Signs Flowsheet     QUICK ADDS     Pain Intervention(s)  Medication (See eMAR) 10/04/18 1704    Quick Adds  -- 10/01/18 0449   Response to Interventions  Decrease in pain 10/04/18 1122    VITAL SIGNS     ANALGESIA SIDE EFFECTS MONITORING      Temp  97.9  F (36.6  C) 10/04/18 1608   Side Effects Monitoring: Respiratory Quality  R 10/04/18 1714    Temp src  Oral 10/04/18 1608   Side Effects Monitoring: Respiratory Depth  N 10/04/18 1714    Resp  16 10/04/18 1608   Side Effects Monitoring: Sedation Level  1 10/04/18 1714    Pulse  86 09/29/18 1034   KERRIE COMA SCALE      Heart Rate  79 10/04/18 1608   Best Eye Response  4-->(E4) spontaneous 10/03/18 1923    Pulse/Heart Rate Source  Monitor 10/04/18 1210   Best Motor Response  6-->(M6) obeys commands 10/03/18 1923    BP  133/83 10/04/18 1608   Best Verbal Response  5-->(V5) oriented 10/03/18 1923    BP Location  Right arm 10/04/18 1608   Kerrie Coma Scale Score  15 10/03/18 1923    Pain Score  3 (Mild) 09/29/18 1109   HEIGHT AND WEIGHT      OXYGEN THERAPY     Height  1.702 m (5' 7\") 09/29/18 0553    SpO2  93 % 10/04/18 1608   Height Method  Stated 09/29/18 0553    O2 Device  None (Room air) 10/04/18 1608   Estimated Weight (From ED)  93 kg (205 lb) 09/29/18 0553    Oxygen Delivery  1 LPM 10/03/18 0814   DAILY CARE      PAIN/COMFORT     Activity Management  ambulated in muniz 10/04/18 1453    Patient Currently in Pain  yes 10/04/18 1704   Activity Assistance Provided  assistance, 1 person 10/04/18 1456    Preferred Pain Scale  number (Numeric Rating Pain Scale) 10/04/18 1702   Assistive Device Utilized  walker;gait belt 10/04/18 1457    Patient's " Stated Pain Goal  No pain 09/30/18 0345   POSITIONING      0-10 Pain Scale  8 10/04/18 1714   Body Position  independently positioning 10/04/18 0825    Pain Location  Rib cage 10/04/18 1704   Head of Bed (HOB)  HOB at 20-30 degrees 10/04/18 0014    Pain Orientation  Right 10/04/18 0811   Positioning/Transfer Devices  pillows;in use 10/04/18 0014    Pain Descriptors  Aching 10/04/18 0015   Chair  Upright in chair 10/01/18 1918    Pain Management Interventions  pain plan reviewed with patient/caregiver 10/03/18 1021                 Patient Lines/Drains/Airways Status    Active LINES/DRAINS/AIRWAYS     Name: Placement date: Placement time: Site: Days: Last dressing change:    Peripheral IV 09/29/18 Left 09/29/18   0610      5             Patient Lines/Drains/Airways Status    Active PICC/CVC     None            Intake/Output Detail Report     Date Intake       Output Net    Shift P.O. I.V. Other IV Piggyback Total Urine Total       Day 10/03/18 0700 - 10/03/18 1459 240 -- -- -- 240 325 325 -85    Chel 10/03/18 1500 - 10/03/18 2259 -- -- -- -- -- 1125 1125 -1125    Noc 10/03/18 2300 - 10/04/18 0659 -- -- -- -- -- 150 150 -150    Day 10/04/18 0700 - 10/04/18 1459 -- -- -- -- -- 400 400 -400    Chel 10/04/18 1500 - 10/04/18 2259 -- -- -- -- -- -- -- 0      Last Void/BM       Most Recent Value    Urine Occurrence 1 at 10/04/2018 0400    Stool Occurrence       Case Management/Discharge Planning     Case Management/Discharge Planning Flowsheet     REFERRAL INFORMATION     COPING/STRESS      Admission Type  inpatient 10/03/18 1432   Major Change/Loss/Stressor  illness 09/29/18 1520    Arrived From  admitted as an inpatient 02/08/17 1521   EXPECTED DISCHARGE      Referral Source  physician 10/03/18 1432   Expected Discharge Date  10/05/18 10/04/18 1514    # of Referrals Placed by CTS  Transportation;Post Acute Facilities;Senior Linkage Line 10/04/18 1443   ASSESSMENT/CONCERNS TO BE ADDRESSED      Post Acute Facilities  TCU  10/04/18 1443   Concerns To Be Addressed  discharge planning concerns 09/28/14 1136    Reason For Consult  discharge planning;facility placement 10/03/18 1432   DISCHARGE PLANNING      Record Reviewed  clinical discipline documentation;history and physical;medical record;patient profile 10/03/18 1432   Transportation Available  car (Pt drives at baseline.) 10/02/18 1138    CTS Assigned to Case  Adriana Ledesma 10/04/18 1443   Equipment Used at Home  none 01/16/15 1456    Primary Care Clinic Name  [facility] 05/04/18 1140   FINAL RESOURCES      Primary Care MD Name  Dr. Elias 05/04/18 1140   Equipment Currently Used at Home  none 10/02/18 1138    LIVING ENVIRONMENT     Resources List  Skilled Nursing Facility 10/04/18 1443    Lives With  other (see comments) (roommate) 10/03/18 1432   Skilled Nursing Facility  [facility] [phone], [phone] 10/04/18 1443    Living Arrangements  house 10/03/18 1432   PAS Number  [id number] ([id number]) 10/04/18 1528    Provides Primary Care For  no one 10/03/18 1432   ABUSE RISK SCREEN      Able to Return to Prior Living Arrangements  no 10/03/18 1432   QUESTION TO PATIENT:  Has a member of your family or a partner(now or in the past) intimidated, hurt, manipulated, or controlled you in any way?  no 09/29/18 0555    ASSESSMENT OF FAMILY/SOCIAL SUPPORT     QUESTION TO PATIENT: Do you feel safe going back to the place where you are living?  yes 09/29/18 0555    Marital Status   10/03/18 1432   OTHER      Who is your support system?  Sibling(s) 10/03/18 1432   Are you depressed or being treated for depression?  Yes 09/29/18 1520    ASSESSMENT OF FUNCTIONAL STATUS     HOMICIDE RISK      Assesssment of Functional Status  Needs placement in a SNF/TCF for rehabilitation 10/03/18 1432   Feels Like Hurting Others  no 09/29/18 0555

## 2018-09-29 NOTE — ED NOTES
Pt medicated with oxycodone - per admitting MD order - for pain rated 8/10. Pt is also noted to be tremulous. Admitting MD to place order for ETOH withdrawals.

## 2018-09-29 NOTE — ED NOTES
[facility]  ED Nurse Handoff Report    ED Chief complaint: Hematemesis      ED Diagnosis:   Final diagnoses:   Hematemesis with nausea   Closed fracture of multiple ribs of right side, initial encounter   Alcoholic hepatitis without ascites   Secondary esophageal varices with bleeding (H)   Alcoholic intoxication without complication (H)   Fall, initial encounter       Code Status: DNR / DNI    Allergies: No Known Allergies    Activity level - Baseline/Home:  Independent    Activity Level - Current:   Independent     Needed?: No    Isolation: No  Infection: Not Applicable  Bariatric?: No    Vital Signs:   Vitals:    09/29/18 0645 09/29/18 0700 09/29/18 0730 09/29/18 0736   BP: 160/80 152/82  148/70   Pulse:       Resp: 15 13 12 14   Temp:       TempSrc:       SpO2: 98% 98% 95% 93%   Height:           Cardiac Rhythm: ,        Pain level: 0-10 Pain Scale: 8    Is this patient confused?: No   [place] - Suicide Severity Rating Scale Completed?  Yes  If yes, what color did the patient score?  White    Patient Report: 62yo M. Hx of ETOH abuse & esophageal varices. Presnted to ED today with c/o hematemesis. Has been drinking ~0.75L of vodka daily x3 weeks. Last drink was ~0000 today. Clinically sober in ED despite elevated serum ETOH (at 0.3g/dL). At 0400 became nauseated & then had 2 episodes of hematemesis. C/O RUQ & right rib pain. Of note, he feel ~3 weeks ago in his kitchen (unrelated to drinking) and has had right rib pain since. Hgb stable (10.1) - has been in the 7's before with prior GI bleeding. BMP WNL. LFTs & lipase elevated. CT abd/pelvis showed hepatic steatosis & right rib fractures & pulmonary contusion. CT head negative for anything acute & actually showed some resolution of old right subdural bleed. CXR ordered and showed right 6-8 rib fractures.    Family Comments: Brother via phone    OBS brochure/video discussed/provided to patient/family: N/A    ED Medications:    Medications   pantoprazole (PROTONIX) 80 mg in sodium chloride 0.9 % 100 mL infusion (8 mg/hr Intravenous New Bag 9/29/18 0647)   octreotide (sandoSTATIN) 1,250 mcg in sodium chloride 0.9 % 250 mL (50 mcg/hr Intravenous New Bag 9/29/18 0625)   pantoprazole (PROTONIX) 80 mg in sodium chloride 0.9 % 100 mL intermittent infusion (0 mg Intravenous Stopped 9/29/18 0654)   octreotide (sandoSTATIN) injection 50 mcg (50 mcg Intravenous Given 9/29/18 0624)   fentaNYL (PF) (SUBLIMAZE) injection 50 mcg (50 mcg Intravenous Given 9/29/18 0733)   iopamidol (ISOVUE-370) solution 95 mL (95 mLs Intravenous Given 9/29/18 0715)   Saline flush (69 mLs Intravenous Given 9/29/18 0715)       Drips infusing?:  Yes    For the majority of the shift this patient was Green.   Interventions performed were N/A.    Severe Sepsis OR Septic Shock Diagnosis Present: No    To be done/followed up on inpatient unit:  Prevent pneumonia, monitor/treat for ETOH withdrawal, continue monitoring for possibility of GI bleed.    ED NURSE PHONE NUMBER: [phone]    -ADDENDUM-  Pt medicated with PO valium as he is tremulous and appears to be starting to go through withdrawals. Pt states no hx of withdrawal seizures. Pt has been resting intermittently with eyes closed. Was placed on O2 via NC as pt has sleep apnea & COPD. Typically uses CPAP. SpO2 noted to be anywhere from 76-93% on RA while asleep.

## 2018-09-29 NOTE — IP AVS SNAPSHOT
"          [place] [address] ONCOLOGY: [phone]                                              INTERAGENCY TRANSFER FORM - LAB / IMAGING / EKG / EMG RESULTS   2018                    Hospital Admission Date: 2018  DEVONTE SEGURA   : 1955  Sex: Male        Attending Provider: Gray Burns MD     Allergies:  No Known Allergies    Infection:  None   Service:  HOSPITALIST    Ht:  1.702 m (5' 7\")   Wt:  --   Admission Wt:  --    BMI:  --   BSA:  --            Patient PCP Information     Provider PCP Type    FERNANDA BRANTLEY MD General         Lab Results - 3 Days      Magnesium [[id number]]  Resulted: 10/04/18 0820, Result status: Final result    Ordering provider: Mauricio Ramírez MD  10/04/18 0001 Resulting lab: [facility]    Specimen Information    Type Source Collected On   Blood  10/04/18 0750          Components       Value Reference Range Flag Lab   Magnesium 1.7 1.6 - 2.3 mg/dL  FrStHsLb            Phosphorus [[id number]]  Resulted: 10/04/18 0820, Result status: Final result    Ordering provider: Mauricio Ramírez MD  10/04/18 0001 Resulting lab: [facility]    Specimen Information    Type Source Collected On   Blood  10/04/18 0750          Components       Value Reference Range Flag Lab   Phosphorus 3.2 2.5 - 4.5 mg/dL  FrStHsLb            Potassium [[id number]]  Resulted: 10/04/18 08, Result status: Final result    Ordering provider: Mauricio Ramírez MD  10/04/18 0001 Resulting lab: [facility]    Specimen Information    Type Source Collected On   Blood  10/04/18 0750          Components       Value Reference Range Flag Lab   Potassium 3.8 3.4 - 5.3 mmol/L  FrStHsLb            Blood culture [[id number]]  Resulted: 10/04/18 0307, Result status: Preliminary result    Ordering provider: Gray Burns MD  18 1327 Resulting lab: [facility]    Specimen " Information    Type Source Collected On   Blood  09/29/18 1352   Comment:  Left Hand          Components       Value Reference Range Flag Lab   Specimen Description Blood Left Hand      Special Requests Aerobic and anaerobic bottles received   FrStHsLb   Culture Micro No growth after 5 days   75            Blood culture [[id number]]  Resulted: 10/04/18 0307, Result status: Preliminary result    Ordering provider: Gray Burns MD  09/29/18 1327 Resulting lab: [facility]    Specimen Information    Type Source Collected On   Blood  09/29/18 1359   Comment:  Right Hand          Components       Value Reference Range Flag Lab   Specimen Description Blood Right Hand      Special Requests Aerobic and anaerobic bottles received   FrStHsLb   Culture Micro No growth after 5 days   75            Comprehensive metabolic panel [[id number]] (Abnormal)  Resulted: 10/03/18 0841, Result status: Final result    Ordering provider: Mauricio Ramírez MD  10/02/18 2300 Resulting lab: [facility]    Specimen Information    Type Source Collected On   Blood  10/03/18 0758          Components       Value Reference Range Flag Lab   Sodium 137 133 - 144 mmol/L  FrStHsLb   Potassium 3.9 3.4 - 5.3 mmol/L  FrStHsLb   Chloride 105 94 - 109 mmol/L  FrStHsLb   Carbon Dioxide 28 20 - 32 mmol/L  FrStHsLb   Anion Gap 4 3 - 14 mmol/L  FrStHsLb   Glucose 92 70 - 99 mg/dL  FrStHsLb   Urea Nitrogen 9 7 - 30 mg/dL  FrStHsLb   Creatinine 0.85 0.66 - 1.25 mg/dL  FrStHsLb   GFR Estimate >90 >60 mL/min/1.7m2  FrStHsLb   Comment:  Non  GFR Calc   GFR Estimate If Black >90 >60 mL/min/1.7m2  FrStHsLb   Comment:  African American GFR Calc   Calcium 8.5 8.5 - 10.1 mg/dL  FrStHsLb   Bilirubin Total 1.0 0.2 - 1.3 mg/dL  FrStHsLb   Albumin 2.6 3.4 - 5.0 g/dL L FrStHsLb   Protein Total 6.1 6.8 - 8.8 g/dL L FrStHsLb   Alkaline Phosphatase 297 40 - 150 U/L H FrStHsLb   ALT 64 0 - 70 U/L   FrStHsLb   AST 97 0 - 45 U/L H FrStHsLb            Phosphorus [[id number]]  Resulted: 10/03/18 0837, Result status: Final result    Ordering provider: Mauricio Ramírez MD  10/03/18 0001 Resulting lab: [facility]    Specimen Information    Type Source Collected On   Blood  10/03/18 0758          Components       Value Reference Range Flag Lab   Phosphorus 3.0 2.5 - 4.5 mg/dL  FrStHsLb            Magnesium [[id number]]  Resulted: 10/03/18 0837, Result status: Final result    Ordering provider: Mauricio Ramírez MD  10/03/18 0001 Resulting lab: [facility]    Specimen Information    Type Source Collected On   Blood  10/03/18 0758          Components       Value Reference Range Flag Lab   Magnesium 1.8 1.6 - 2.3 mg/dL  FrStHsLb            Potassium [[id number]]  Resulted: 10/02/18 1824, Result status: Final result    Ordering provider: Mauricio Ramírez MD  10/02/18 1508 Resulting lab: [facility]    Specimen Information    Type Source Collected On   Blood  10/02/18 1745          Components       Value Reference Range Flag Lab   Potassium 4.3 3.4 - 5.3 mmol/L  FrStHsLb            Magnesium [[id number]]  Resulted: 10/02/18 1824, Result status: Final result    Ordering provider: Mauricio Ramírez MD  10/02/18 1508 Resulting lab: [facility]    Specimen Information    Type Source Collected On   Blood  10/02/18 1745          Components       Value Reference Range Flag Lab   Magnesium 2.1 1.6 - 2.3 mg/dL  FrStHsLb            Magnesium [[id number]] (Abnormal)  Resulted: 10/02/18 0941, Result status: Final result    Ordering provider: Gray Burns MD  10/02/18 0745 Resulting lab: [facility]    Specimen Information    Type Source Collected On     10/02/18 0745          Components       Value Reference Range Flag Lab   Magnesium 1.4 1.6 - 2.3 mg/dL L FrStHsLb            Phosphorus [[id number]] (Abnormal)  Resulted:  10/02/18 0846, Result status: Final result    Ordering provider: Gray Burns MD  10/02/18 0745 Resulting lab: [facility]    Specimen Information    Type Source Collected On     10/02/18 0745          Components       Value Reference Range Flag Lab   Phosphorus 2.2 2.5 - 4.5 mg/dL L FrStHsLb            CBC with platelets [[id number]] (Abnormal)  Resulted: 10/02/18 0818, Result status: Final result    Ordering provider: Gray Burns MD  10/02/18 0000 Resulting lab: [facility]    Specimen Information    Type Source Collected On   Blood  10/02/18 0745          Components       Value Reference Range Flag Lab   WBC 4.4 4.0 - 11.0 10e9/L  FrStHsLb   RBC Count 3.57 4.4 - 5.9 10e12/L L FrStHsLb   Hemoglobin 8.4 13.3 - 17.7 g/dL L FrStHsLb   Hematocrit 26.9 40.0 - 53.0 % L FrStHsLb   MCV 75 78 - 100 fl L FrStHsLb   MCH 23.5 26.5 - 33.0 pg L FrStHsLb   MCHC 31.2 31.5 - 36.5 g/dL L FrStHsLb   RDW 22.2 10.0 - 15.0 % H FrStHsLb   Platelet Count 104 150 - 450 10e9/L L FrStHsLb            Potassium [[id number]] (Abnormal)  Resulted: 10/02/18 0813, Result status: Final result    Ordering provider: Gray Burns MD  10/02/18 0000 Resulting lab: [facility]    Specimen Information    Type Source Collected On   Blood  10/02/18 0745          Components       Value Reference Range Flag Lab   Potassium 3.3 3.4 - 5.3 mmol/L L FrStHsLb            Magnesium [[id number]] (Abnormal)  Resulted: 10/01/18 1237, Result status: Final result    Ordering provider: Gray Burns MD  10/01/18 0730 Resulting lab: [facility]    Specimen Information    Type Source Collected On     10/01/18 0730          Components       Value Reference Range Flag Lab   Magnesium 1.3 1.6 - 2.3 mg/dL L FrStHsLb            Basic metabolic panel [[id number]] (Abnormal)  Resulted: 10/01/18 0818, Result status: Final result    Ordering provider: Gray Burns,  "MD  10/01/18 0000 Resulting lab: [facility]    Specimen Information    Type Source Collected On   Blood  10/01/18 0730          Components       Value Reference Range Flag Lab   Sodium 138 133 - 144 mmol/L  FrStHsLb   Potassium 3.3 3.4 - 5.3 mmol/L L FrStHsLb   Chloride 104 94 - 109 mmol/L  FrStHsLb   Carbon Dioxide 28 20 - 32 mmol/L  FrStHsLb   Anion Gap 6 3 - 14 mmol/L  FrStHsLb   Glucose 111 70 - 99 mg/dL H FrStHsLb   Urea Nitrogen 10 7 - 30 mg/dL  FrStHsLb   Creatinine 0.80 0.66 - 1.25 mg/dL  FrStHsLb   GFR Estimate >90 >60 mL/min/1.7m2  FrStHsLb   Comment:  Non  GFR Calc   GFR Estimate If Black >90 >60 mL/min/1.7m2  FrStHsLb   Comment:  African American GFR Calc   Calcium 8.2 8.5 - 10.1 mg/dL L FrStHsLb            CBC with platelets [[id number]] (Abnormal)  Resulted: 10/01/18 0759, Result status: Final result    Ordering provider: Gray Burns MD  10/01/18 0000 Resulting lab: [facility]    Specimen Information    Type Source Collected On   Blood  10/01/18 0730          Components       Value Reference Range Flag Lab   WBC 4.7 4.0 - 11.0 10e9/L  FrStHsLb   RBC Count 3.56 4.4 - 5.9 10e12/L L FrStHsLb   Hemoglobin 8.4 13.3 - 17.7 g/dL L FrStHsLb   Hematocrit 27.0 40.0 - 53.0 % L FrStHsLb   MCV 76 78 - 100 fl L FrStHsLb   MCH 23.6 26.5 - 33.0 pg L FrStHsLb   MCHC 31.1 31.5 - 36.5 g/dL L FrStHsLb   RDW 21.9 10.0 - 15.0 % H FrStHsLb   Platelet Count 105 150 - 450 10e9/L L FrStHsLb            Testing Performed By     Lab - Abbreviation Name Director Address Valid Date Range    14 - FrStHsLb [facility] Unknown [address] [place] [address] 05/08/15 1057 - Present    75 - Unknown [facility] Unknown [address] [facility] [address] 01/15/15 1019 - Present            Unresulted Labs (24h ago through future)    Start       Ordered    Unscheduled  Potassium  (Potassium Replacement - \"High\" - Replacement for " "all levels less than 4.1 mmol/L - UU,UR,UA,RH,SH,PH,WY )  CONDITIONAL (SPECIFY),   Routine     Comments:  Obtain Potassium Level for these conditions:  *IF no potassium result within 24 hrs before initiation of order set, draw potassium level with next lab collect.    *2 HOURS AFTER last IV potassium replacement dose and 4 hours after an oral replacement dose when potassium replacement given for level less than 3.4.  *Next morning after potassium dose.     Repeat Potassium Replacement if necessary.    10/02/18 0809    Unscheduled  Magnesium  (Magnesium Replacement -  Adult - \"Standard\" - Replacement for all levels less than 1.6 mg/dL )  CONDITIONAL (SPECIFY),   Routine     Comments:  Obtain Magnesium Level for these conditions:  *IF no magnesium result within 24 hrs before initiation of order set, draw magnesium level with next lab collect.    *2 HOURS AFTER last magnesium replacement dose when magnesium replacement given for level less than 1.6   *Next morning after magnesium dose.     Repeat Magnesium Replacement if necessary.    10/02/18 0809    Unscheduled  Phosphorus  (POTASSIUM Phosphate - \"High\" - Replacement for all levels less than 2.8 mg/dL )  CONDITIONAL (SPECIFY),   Routine     Comments:  Obtain Phosphorus Level for these conditions:  *IF no phosphorus result within 24 hrs before initiation of order set, draw phosphorus level with next lab collect.    *2 HOURS AFTER last phosphorus replacement dose when phosphorus replacement given for level less than 2.0  *Next morning after phosphorus dose.     Repeat Phosphorus Replacement if necessary.    10/02/18 0809      Encounter-Level Documents:     There are no encounter-level documents.      Order-Level Documents:     There are no order-level documents.      "

## 2018-09-29 NOTE — PROGRESS NOTES
[facility]    Sepsis Evaluation Progress Note    Date of Service: 09/29/2018    I was called to see Jim Richard due to abnormal vital signs triggering the Sepsis SIRS screening alert. He is not known to have an infection.     Physical Exam    Vital Signs:  Temp: 97.6  F (36.4  C) Temp src: Oral BP: 153/79 Pulse: 86 Heart Rate: 90 Resp: 18 SpO2: 97 % O2 Device: Nasal cannula Oxygen Delivery: 3 LPM    Lab:  Lactate for Sepsis Protocol   Date Value Ref Range Status   09/29/2018 4.3 (HH) 0.7 - 2.0 mmol/L Final     Comment:     Critical Value called to and read back by  SHADY BARBA ON 88 AT 1318 HH         The patient is at baseline mental status.    The rest of their physical exam is significant for right chest pain related to rib fractures and RUQ tenderness suspect relate to alcoholic hepatitis.    Assessment and Plan    The SIRS and exam findings are likely due to GI bleed,, there is no sign of sepsis at this time.  Is elevated Lactate is probably related to underlying liver disease, and will give a fluid bolus given his intravascular volume depletion related to the current GI bleed.      Disposition: The patient will remain on the current unit. We will continue to monitor this patient closely.    Gray Vincent MD

## 2018-09-29 NOTE — IP AVS SNAPSHOT
[address], [address]    [facility] [address]    Phone:  [phone]                                       After Visit Summary   9/29/2018    Jim Richard    MRN: [id number]           After Visit Summary Signature Page     I have received my discharge instructions, and my questions have been answered. I have discussed any challenges I see with this plan with the nurse or doctor.    ..........................................................................................................................................  Patient/Patient Representative Signature      ..........................................................................................................................................  Patient Representative Print Name and Relationship to Patient    ..................................................               ................................................  Date                                   Time    ..........................................................................................................................................  Reviewed by Signature/Title    ...................................................              ..............................................  Date                                               Time          22EPIC Rev 08/18

## 2018-09-29 NOTE — IP AVS SNAPSHOT
` `           [place] [address] ONCOLOGY: [phone]                 INTERAGENCY TRANSFER FORM - NOTES (H&P, Discharge Summary, Consults, Procedures, Therapies)   2018                    Hospital Admission Date: 2018  DEVONTE RICHARD   : 1955  Sex: Male        Patient PCP Information     Provider PCP Type    FERNANDA BRANTLEY MD General         History & Physicals      H&P by Gray Burns MD at 2018  7:53 AM     Author:  Gray Burns MD Service:  Hospitalist Author Type:  Physician    Filed:  2018  9:28 AM Date of Service:  2018  7:53 AM Creation Time:  2018  7:53 AM    Status:  Signed :  Gray Burns MD (Physician)         [facility]    History and Physical  Hospitalist       Date of Admission:  2018[JM1.1]    Assessment & Plan[JM1.2]   Devonte Richard is a 63 year old male[JM1.1] who is an alcoholic, has liver disease with esophagela varices and was admitted in 2018 with Esophageal varice bleed (6 bands placed)[JM1.3] who[JM1.1] is actively drinking,[JM1.3] presents with[JM1.1] vomiting small amount of blood twice this AM:[JM1.3]    Summary:[JM1.1]    Principal Problem:    Hematemesis  Active Problems:    Alcohol abuse    Rib Fractures (right 7th, 8th, 9th) -- occured 2018    Alcoholic hepatitis without ascites    Acute blood loss anemia    Esophageal varices (EGD with 6 varices banded 18)    Essential hypertension    COPD (chronic obstructive pulmonary disease) (H)    Smoker    JANIS on CPAP    Chronic low back pain[JM1.2]       Plan:[JM1.1] Admit to medicine floor, is on Octreatide infusion, and IV Protonix 8 mg/min IV after initial 80 mg IV load, will get serial hgb's, and GI consult to consider EGD at some point.  Updated brother (Jame), and patient has living will and desires DNR/DNI.[JM1.3]      DVT Prophylaxis:[JM1.1] Pneumatic Compression Devices[JM1.3]  Code Status:[JM1.1] DNR  / DNI[JM1.3]    Disposition: Expected discharge in[JM1.1] 3[JM1.3] days once[JM1.1] bleeding stops and over alcohol withdrawal[JM1.3].[JM1.1]    Gray Vincent[JM1.2], MD[JM1.1]  Pager: [phone]  Cell Phone:  [phone][JM1.3]       Primary Care Physician   FERNANDA BRANTLEY    Chief Complaint[JM1.2]   Vomited blood[JM1.3]    History is obtained from Patient[JM1.1]    History of Present Illness[JM1.2]    63 year old male[JM1.1] who is an alcoholic, has liver disease with esophagela varices and was admitted in April 2018 with Esophageal varice bleed (6 bands placed)[JM1.3] who[JM1.1] is actively drinking,[JM1.3] presents with[JM1.1] vomiting small amount of blood twice at 0400 today and called 911.  He has been drinking Vodka 0.75 liter/day and admits to be doing so for the last 3 months, brother says it has been longer.  He wants to quit and plans to go to AA at discharge -- has gone to several treatment programs in the past, and says he recently quit for 9 months (brother says it was more like 3 months).  Last admitted on 4/26/18 to 5/4/18 -- EGD then showed grade III esophageal varices and 6 bands were placed.  His hgb dropped to 5.8 then and he got 2 units of blood.  His last drink was MN (8 hours ago), and he denies melena or BRBPR.  He recalls falling 20 days ago (9/9/18) and landed on right side and it has hurt since then, and he has broken ribs on left side in the past as well.[JM1.3]  He stopped all his medicines about ?3 weeks ago.     Past Medical History[JM1.2]    I have reviewed this patient's medical history and updated it with pertinent information if needed.[JM1.3]   Past Medical History:   Diagnosis Date     Alcoholism (H) 1/14/2013    had treatment at [facility]     Anxiety      Coronary artery disease 09/09/2014    Nuclear stress test with slight fixed defect inferiorly, no ischemia     Depression     w/anxiety     Esophageal varices with bleeding 04/28/2018    6 varices banded  on EGD     Heart attack      Hepatomegaly     Fatty liver, chronic alcoholic     Hyperlipemia      Hypertension      JANIS on CPAP      Peripheral vascular disease (H)      PVD (peripheral vascular disease) (H)      SDH (subdural hematoma) (H) 04/26/2018    Right side, resolved spontaneously     Spinal stenosis        Past Surgical History[JM1.2]   I have reviewed this patient's surgical history and updated it with pertinent information if needed.[JM1.3]  Past Surgical History:   Procedure Laterality Date     APPENDECTOMY       BYPASS GRAFT FEMOROPOPLITEAL  6/12/2012    Procedure: BYPASS GRAFT FEMOROPOPLITEAL;  LEFT FEMORAL TO ABOVE KNEE POPLITEAL BYPASS, ENDARTERECTOMY OF SFA AND PF ARTERIES, LEFT EXTERNAL ILIAC TO COMMON FEMORAL INTERPOSITION GRAFT;  Surgeon: Damir Roberts MD;  Location:  OR     COLONOSCOPY       ENDARTERECTOMY FEMORAL  6/12/2012    Procedure: ENDARTERECTOMY FEMORAL;;  Surgeon: Damir Roberts MD;  Location:  OR     ESOPHAGOSCOPY, GASTROSCOPY, DUODENOSCOPY (EGD), COMBINED N/A 3/22/2018    Procedure: COMBINED ESOPHAGOSCOPY, GASTROSCOPY, DUODENOSCOPY (EGD);  ESOPHAGOSCOPY, GASTROSCOPY, DUODENOSOCPY.;  Surgeon: Valeri Pruitt MD;  Location:  OR     HERNIA REPAIR  2017    Abdominal     LAMINECTOMY, FUSION LUMBAR THREE+ LEVEL, COMBINED N/A 9/22/2014    Procedure: COMBINED LAMINECTOMY, FUSION LUMBAR THREE+ LEVEL;  Surgeon: Sebastien Pruitt MD;  Location:  OR     TONSILLECTOMY & ADENOIDECTOMY         Prior to Admission Medications   Prior to Admission Medications   Prescriptions Last Dose Informant Patient Reported? Taking?   ATORVASTATIN CALCIUM PO 9/28/2018 at Unknown time Self Yes Yes   Sig: Take 10 mg by mouth At Bedtime    DULoxetine (CYMBALTA) 60 MG EC capsule 9/28/2018 at Unknown time Self No Yes   Sig: Take 1 capsule (60 mg) by mouth daily   MIRTAZAPINE PO 9/28/2018 at Unknown time Self Yes Yes   Sig: Take 30 mg by mouth At Bedtime   QUETIAPINE FUMARATE PO PRN Self Yes Yes   Sig:  Take 50 mg by mouth 3 times daily as needed   TAMSULOSIN HCL PO 9/28/2018 at Unknown time Self Yes Yes   Sig: Take 0.4 mg by mouth daily   TRAZODONE HCL PO PRN Self Yes Yes   Sig: Take 100 mg by mouth nightly as needed (Takes 2 x 100mg for a total of 200mg at bedtime)   albuterol (PROAIR HFA/PROVENTIL HFA/VENTOLIN HFA) 108 (90 BASE) MCG/ACT Inhaler PRn Self Yes Yes   Sig: Inhale 2 puffs into the lungs every 6 hours as needed    fluticasone-vilanterol (BREO ELLIPTA) 200-25 MCG/INH oral inhaler PRN Self Yes Yes   Sig: Inhale 1 puff into the lungs daily as needed    hydrOXYzine (ATARAX) 25 MG tablet PRN at Unknown time Self No Yes   Sig: Take 2 tablets (50 mg) by mouth nightly as needed (sleep)   methocarbamol (ROBAXIN) 750 MG tablet PRN at Unknown time Self No Yes   Sig: Take 1 tablet (750 mg) by mouth 3 times daily as needed for muscle spasms   omeprazole (PRILOSEC) 40 MG capsule Past Month at Unknown time Self No Yes   Sig: Take 1 capsule (40 mg) by mouth 2 times daily (before meals) Take 30-60 minutes before a meal.   spironolactone-hydrochlorothiazide (ALDACTAZIDE) 25-25 MG per tablet 9/28/2018 at Unknown time Self Yes Yes   Sig: Take 1 tablet by mouth daily      Facility-Administered Medications: None     Allergies   No Known Allergies    Social History[JM1.2]   I have reviewed this patient's social history and updated it with pertinent information if needed. Jim Richard[JM1.3]  reports that he has been smoking Cigarettes.  He has been smoking about 1.00 pack per day. He has never used smokeless tobacco. He reports that he drinks alcohol. He reports that he does not use illicit drugs.    Family History[JM1.2]   I have reviewed this patient's family history and updated it with pertinent information if needed.[JM1.3]   Family History   Problem Relation Age of Onset     Mental Illness Son      Coronary Artery Disease Early Onset Mother      Substance Abuse Father      Lung Cancer Father      Substance Abuse  Brother      Unknown/Adopted No family hx of      Depression No family hx of      Anxiety Disorder No family hx of      Schizophrenia No family hx of      Bipolar Disorder No family hx of      Suicide No family hx of      Dementia No family hx of      Anthony Disease No family hx of      Parkinsonism No family hx of      Autism Spectrum Disorder No family hx of      Intellectual Disability (Mental Retardation) No family hx of        Review of Systems[JM1.2]   The 10 point Review of Systems is negative other than noted in the HPI or here.[JM1.3]     # Pain Assessment:[JM1.1]  Current Pain Score 9/29/2018   Patient currently in pain? -   Pain score (0-10) 6   Pain location -   Pain descriptors -[JM1.2]   - Jim is experiencing pain due to right rib fractures. Pain management was discussed and the plan was created in a collaborative fashion.  Jim's response to the current recommendations: engaged  - see orders[JM1.3]      Physical Exam   Temp: 98.3  F (36.8  C) Temp src: Oral BP: 104/48 Pulse: 81 Heart Rate: 82 Resp: 16 SpO2: 93 % O2 Device: None (Room air)[JM1.2]    Vital Signs with Ranges[JM1.1]  Temp:  [98.3  F (36.8  C)] 98.3  F (36.8  C)  Pulse:  [81] 81  Heart Rate:  [64-84] 82  Resp:  [10-16] 16  BP: (101-160)/(41-82) 104/48  SpO2:  [93 %-98 %] 93 %  0 lbs 0 oz[JM1.2]    Constitutional: Awake, alert, cooperative, no apparent distress.  Eyes: Conjunctiva and pupils examined and normal.  HEENT: Moist mucous membranes, normal dentition.  Respiratory:[JM1.1] Slight expiratory wheeze bilaterally[JM1.2]  Cardiovascular: Regular rate and rhythm, normal S1 and S2, and no murmur noted,   No carotid bruits.[JM1.1]  1+ bilateral[JM1.3] ankle edema.[JM1.1]   Chest: tender over right lower chest wall, no flail chest[JM1.3]  GI: Soft, non-distended,[JM1.1] mild RUQ tenderness but no epigastric tenderness[JM1.3], normal bowel sounds.  Lymph/Hematologic: No anterior cervical, supraclavicular or axillary adenopathy.  Skin:  No rashes, no cyanosis.   Musculoskeletal: No joint swelling, erythema or tenderness.  Neurologic: Cranial nerves 2-12 intact, no focal weakness or numbness  Psychiatric: Alert, Ox3,[JM1.1] mildly tremulous[JM1.3]    Data[JM1.2]   Data reviewed today:[JM1.1]  No EKG seen[JM1.3]    Recent Labs  Lab 09/29/18  0550   WBC 6.1   HGB 10.1*   MCV 74*      INR 1.10      POTASSIUM 3.4   CHLORIDE 106   CO2 23   BUN 16   CR 0.79   ANIONGAP 13   KEV 8.1*   GLC 71   ALBUMIN 3.2*   PROTTOTAL 7.2   BILITOTAL 1.0   ALKPHOS 332*   ALT 60   *   LIPASE 585*[JM1.2]       Imaging:[JM1.1]  Recent Results (from the past 24 hour(s))   CT Head w/o Contrast    Narrative    CT OF THE HEAD WITHOUT CONTRAST 9/29/2018 7:23 AM     COMPARISON: Brain MRI 4/28/2018.    HISTORY:  History of subdural hematoma, fall.     TECHNIQUE: 5 mm thick axial CT images of the head were acquired  without IV contrast material.    FINDINGS:  There is mild diffuse cerebral volume loss. There are  subtle patchy areas of decreased density in the cerebral white matter  bilaterally that are consistent with sequela of chronic small vessel  ischemic disease.     The ventricles and basal cisterns are within normal limits in  configuration given the degree of cerebral volume loss.  There is no  midline shift. There are no extra-axial fluid collections.     No intracranial hemorrhage, mass or recent infarct.    The visualized paranasal sinuses are well-aerated. There is no  mastoiditis. There are no fractures of the visualized bones.       Impression    IMPRESSION:  Diffuse cerebral volume loss and cerebral white matter  changes consistent with chronic small vessel ischemic disease. No  evidence for acute intracranial pathology. This represents interval  resolution of the right cerebral convexity subdural collection noted  on the comparison MRI.    Radiation dose for this scan was reduced using automated exposure  control, adjustment of the mA and/or kV  according to patient size, or  iterative reconstruction technique.   CT Abdomen Pelvis w Contrast    Narrative    Exam: CT ABDOMEN PELVIS W CONTRAST  9/29/2018 7:23 AM    History: Fall with pain over the RIGHT upper quadrant.    Comparison: 10/7/2016    Technique: Volumetric acquisition with reconstruction in the axial,  coronal planes through the abdomen and pelvis with contrast. Radiation  dose for this scan was reduced using automated exposure control,  adjustment of the mA and/or kV according to patient size, or iterative  reconstruction technique.    Contrast: 95 mL Isovue-370    Findings:   Lung Bases: Very mild peripheral opacities in the posterior RIGHT  lower lobe. Lung bases are otherwise clear. No pleural or pericardial  effusion.    Abdomen: Hepatic steatosis, liver otherwise appears normal. Spleen,  adrenal glands, kidneys, pancreas and gallbladder appear normal.    No areas of bowel wall thickening or bowel dilatation. No free fluid.  No abdominal or pelvic lymphadenopathy. Pelvic structures are  unremarkable. Atherosclerotic calcifications of the abdominal aorta  and its branch vessels without aneurysmal dilatation.    Bones: Mildly displaced acute fractures involving the RIGHT seventh,  eighth and ninth ribs as well as chronic fractures involving multiple  ribs on both sides. There may be additional rib fractures more  superiorly, but these are not included in this study.      Impression    Impression:   1. Mildly displaced acute fractures involving the RIGHT seventh,  eighth and ninth ribs. Additional rib fractures may be present, but  are not included in this study. Mild underlying opacities in the RIGHT  lower lobe may represent mild pulmonary contusion.  2. Hepatic steatosis.    VIVIAN SHELDON MD   Ribs XR, unilat 3 views + PA chest, right    Narrative    RIGHT RIBS AND CHEST THREE VIEWS  9/29/2018 8:01 AM     COMPARISON:  Two-view chest x-ray 3/21/2019.    HISTORY: fall rib fractures;      FINDINGS: There are nondisplaced fractures of the right fifth and  eighth ribs posteriorly and moderately displaced fractures of the  right sixth and seventh ribs posteriorly. No other rib fractures  noted. The cardiac silhouette, pulmonary vasculature, lungs and  pleural spaces are within normal limits.      Impression    IMPRESSION: Clear lungs. Right rib fractures as detailed above.[JM1.2]         Revision History        User Key Date/Time User Provider Type Action    > JM1.2 9/29/2018  9:28 AM Gray Burns MD Physician Sign     JM1.3 9/29/2018  9:06 AM Gray Burns MD Physician      JM1.1 9/29/2018  7:53 AM Gray Burns MD Physician                      Discharge Summaries      Discharge Summaries by Jude Duarte DO at 10/4/2018  3:17 PM     Author:  Jude Duarte DO Service:  Hospitalist Author Type:  Physician    Filed:  10/4/2018  3:51 PM Date of Service:  10/4/2018  3:17 PM Creation Time:  10/4/2018  3:16 PM    Status:  Signed :  Jude Duarte DO (Physician)         [facility]    Discharge Summary  Hospitalist    Date of Admission:  9/29/2018  Date of Discharge:[AM1.1]  10/4/2018[AM1.2]  Discharging Provider:[AM1.1] Jude Duarte[AM1.3]  Date of Service (when I saw the patient):[AM1.1] 10/04/18[AM1.2]      History of Present Illness[AM1.3]  From admission H&P[AM1.1]  63 year old male who is an alcoholic, has liver disease with esophagela varices and was admitted in April 2018 with Esophageal varice bleed (6 bands placed) who is actively drinking, presents with vomiting small amount of blood twice at 0400 today and called 911.  He has been drinking Vodka 0.75 liter/day and admits to be doing so for the last 3 months, brother says it has been longer.  He wants to quit and plans to go to AA at discharge -- has gone to several treatment programs in the past, and says he recently quit for 9 months (brother says it was  more like 3 months).  Last admitted on 4/26/18 to 5/4/18 -- EGD then showed grade III esophageal varices and 6 bands were placed.  His hgb dropped to 5.8 then and he got 2 units of blood.  His last drink was MN (8 hours ago), and he denies melena or BRBPR.  He recalls falling 20 days ago (9/9/18) and landed on right side and it has hurt since then, and he has broken ribs on left side in the past as well.  He stopped all his medicines about ?3 weeks ago.[AM1.2]     Hospital Course[AM1.3]   Jim Richard was admitted on 9/29/2018.  The following problems were addressed during his hospitalization:[AM1.1]    Hematemesis with upper gastrointestinal hemorrhage likely due to duodenal ulcer and varices  Acute blood loss anemia  Esophageal varices (EGD with 6 varices banded 4/27/18)  Duodenal ulcer  EGD 9/29/18 with grade I varices, non bleeding duodenal ulcer and gastric mucosa congestion but no active bleeding at the time.  Did not require transfusion with PRBCs.  hgb 10.4 on admission, down to 8.4 on discharge.  Repeat hgb in 5 days.  Continues on BID PPI     Alcohol abuse with alcohol withdrawal   Pt required valium with CIWA scoring but has not needed any for two days prior to discharge.  Received thiamine and folate and will continue on MVI on discharge.  Encouraged cessation.      Hypokalemia and hypomagnesemia   Replaced per protocol     Rib Fractures (right 7th, 8th, 9th) -- occured 9/9/2018  Has ongoing pain so will be given a limited number of oxycodone to use at TCU     Alcoholic hepatitis without ascites  Improving and likely due to alcohol.  Repeat LFTs in 5 days.      Essential hypertension  Continue hydrochlorothiazide and spironolactone     COPD (chronic obstructive pulmonary disease)  Tobacco dependence  Continue PTA inhaler regimen     JANIS on CPAP     Chronic low back pain  Oxycodone to 5 mg qid prn     Urinary retention  Leger placed but did well with a voiding trial prior to discharge.   Continue  PTA Flomax[AM1.2]    Jude Duarte[AM1.1]    Significant Results and Procedures[AM1.3]   EGD 9/29    Findings:        Grade I varices were found in the lower third of the esophagus. They        were 5 mm in largest diameter.        Moderte portal gastropathy.        The cardia and gastric fundus were normal on retroflexion.        Four non-bleeding superficial duodenal ulcers were found in the duodenal        bulb and in the first portion of the duodenum. The largest lesion was 10        mm in largest dimension.        Diffuse mildly congested mucosa without active bleeding and with no        stigmata of bleeding was found in the second portion of the duodenum.                                                                                     Impression:               - Grade I esophageal varices.                             - Multiple non-bleeding duodenal ulcers.                             - Congested duodenal mucosa.                             - No specimens collected.   Recommendation:           - Return patient to hospital basilio for ongoing care.                             - Use Protonix (pantoprazole) 80 mg IV daily.[AM1.2]     Pending Results[AM1.3]   These results will be followed up by[AM1.1] PCP[AM1.2]  Unresulted Labs Ordered in the Past 30 Days of this Admission     Date and Time Order Name Status Description    9/29/2018 1327 Blood culture Preliminary     9/29/2018 1327 Blood culture Preliminary           Code Status[AM1.3]   DNR / DNI[AM1.2]       Primary Care Physician   FERNANDA BRANTLEY[AM1.3]    General:[AM1.1] sitting in bed, appears comfortable[AM1.2]  Cardiovascular:[AM1.1] RRR[AM1.2]  Pulmonary:[AM1.1] CTAB[AM1.2]  GI:[AM1.1] +BS, soft[AM1.2]  Lymphatics:[AM1.1] no edema[AM1.2]    Discharge Disposition[AM1.3]   Discharged to short-term care facility[AM1.2]  Condition at discharge:[AM1.1] Stable[AM1.2]    Consultations This Hospital Stay   GASTROENTEROLOGY IP CONSULT  PHYSICAL THERAPY  ADULT IP CONSULT  CARE TRANSITION RN/SW IP CONSULT  PHYSICAL THERAPY ADULT IP CONSULT  OCCUPATIONAL THERAPY ADULT IP CONSULT    Time Spent on this Encounter[AM1.3]   I, Jude Duarte, personally saw the patient today and spent less than or equal to 30 minutes discharging this patient.[AM1.2]    Discharge Orders     General info for SNF   Length of Stay Estimate: Short Term Care: Estimated # of Days <30  Condition at Discharge: Improving  Level of care:skilled   Rehabilitation Potential: Good  Admission H&P remains valid and up-to-date: Yes  Recent Chemotherapy: N/A  Use Nursing Home Standing Orders: Yes     Mantoux instructions   Give two-step Mantoux (PPD) Per Facility Policy Yes     Follow Up and recommended labs and tests   Follow up with PCP in 1 week ++ repeat Hgb and LFTs in 5 days     Activity - Up with nursing assistance     DNR (Do Not Resuscitate)     Physical Therapy Adult Consult   Evaluate and treat as clinically indicated.    Reason:  Weakness and deconditioning     Occupational Therapy Adult Consult   Evaluate and treat as clinically indicated.    Reason:  Weakness and deconditioning     Fall precautions     Advance Diet as Tolerated   Follow this diet upon discharge: Orders Placed This Encounter     Regular Diet Adult       Discharge Medications   Current Discharge Medication List      START taking these medications    Details   multivitamin, therapeutic with minerals (THERA-VIT-M) TABS tablet Take 1 tablet by mouth daily  Qty: 30 each, Refills: 0    Associated Diagnoses: Alcoholic intoxication without complication (H)      oxyCODONE IR (ROXICODONE) 5 MG tablet Take 1 tablet (5 mg) by mouth 4 times daily as needed for pain  Qty: 10 tablet, Refills: 0    Associated Diagnoses: Closed fracture of multiple ribs of right side, initial encounter         CONTINUE these medications which have NOT CHANGED    Details   albuterol (PROAIR HFA/PROVENTIL HFA/VENTOLIN HFA) 108 (90 BASE) MCG/ACT Inhaler Inhale  2 puffs into the lungs every 6 hours as needed       ATORVASTATIN CALCIUM PO Take 10 mg by mouth At Bedtime       DULoxetine (CYMBALTA) 60 MG EC capsule Take 1 capsule (60 mg) by mouth daily  Qty: 30 capsule, Refills: 0    Associated Diagnoses: 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)      fluticasone-vilanterol (BREO ELLIPTA) 200-25 MCG/INH oral inhaler Inhale 1 puff into the lungs daily as needed       hydrOXYzine (ATARAX) 25 MG tablet Take 2 tablets (50 mg) by mouth nightly as needed (sleep)  Qty: 30 tablet, Refills: 0      methocarbamol (ROBAXIN) 750 MG tablet Take 1 tablet (750 mg) by mouth 3 times daily as needed for muscle spasms  Qty: 15 tablet, Refills: 0      MIRTAZAPINE PO Take 30 mg by mouth At Bedtime      omeprazole (PRILOSEC) 40 MG capsule Take 1 capsule (40 mg) by mouth 2 times daily (before meals) Take 30-60 minutes before a meal.  Qty: 60 capsule, Refills: 1    Associated Diagnoses: Hematemesis without nausea      QUETIAPINE FUMARATE PO Take 50 mg by mouth 3 times daily as needed      spironolactone-hydrochlorothiazide (ALDACTAZIDE) 25-25 MG per tablet Take 1 tablet by mouth daily      TAMSULOSIN HCL PO Take 0.4 mg by mouth daily      TRAZODONE HCL PO Take 100 mg by mouth nightly as needed (Takes 2 x 100mg for a total of 200mg at bedtime)           Allergies   No Known Allergies  Data[AM1.3]   Most Recent 3 CBC's:[AM1.1]  Recent Labs   Lab Test  10/02/18   0745  10/01/18   0730 09/30/18   0050   09/29/18   0550   WBC  4.4  4.7   --    --   6.1   HGB  8.4*  8.4*  8.3*   < >  10.1*   MCV  75*  76*   --    --   74*   PLT  104*  105*   --    --   207    < > = values in this interval not displayed.[AM1.3]      Most Recent 3 BMP's:[AM1.1]  Recent Labs   Lab Test  10/04/18   0750  10/03/18   0758  10/02/18   1745   10/01/18   0730  09/29/18   0550   NA   --   137   --    --   138  142   POTASSIUM  3.8  3.9  4.3   < >  3.3*  3.4   CHLORIDE   --   105   --    --   104  106   CO2    --   28   --    --   28  23   BUN   --   9   --    --   10  16   CR   --   0.85   --    --   0.80  0.79   ANIONGAP   --   4   --    --   6  13   KEV   --   8.5   --    --   8.2*  8.1*   GLC   --   92   --    --   111*  71    < > = values in this interval not displayed.[AM1.3]     Most Recent 2 LFT's:[AM1.1]  Recent Labs   Lab Test  10/03/18   0758  09/29/18   0550   AST  97*  140*   ALT  64  60   ALKPHOS  297*  332*   BILITOTAL  1.0  1.0[AM1.3]     Most Recent INR's and Anticoagulation Dosing History:  Anticoagulation Dose History     Recent Dosing and Labs Latest Ref Rng & Units 3/21/2018 3/22/2018 4/26/2018 4/29/2018 4/30/2018 5/21/2018 9/29/2018    INR 0.86 - 1.14 1.31(H) 1.34(H) 1.52(H) 1.21(H) 1.13 1.08 1.10        Most Recent 3 Troponin's:[AM1.1]  Recent Labs   Lab Test  04/26/18   1940   TROPI  <0.015[AM1.3]     Most Recent Cholesterol Panel:[AM1.1]  Recent Labs   Lab Test  06/12/12   0530   CHOL  198   LDL  81   HDL  95   TRIG  106[AM1.3]     Most Recent 6 Bacteria Isolates From Any Culture (See EPIC Reports for Culture Details):[AM1.1]  Recent Labs   Lab Test  09/29/18   1359  09/29/18   1352  05/02/18   1535  05/02/18   1530  10/08/16   1150   CULT  No growth after 5 days  No growth after 5 days  No growth  No growth  Moderate growth Normal trevin[AM1.3]     Most Recent TSH, T4 and A1c Labs:[AM1.1]  Recent Labs   Lab Test  03/22/18   0430  02/08/17   1640   TSH   --   1.51   A1C  Canceled, Test credited   --[AM1.3]      Results for orders placed or performed during the hospital encounter of 09/29/18   CT Head w/o Contrast    Narrative    CT OF THE HEAD WITHOUT CONTRAST 9/29/2018 7:23 AM     COMPARISON: Brain MRI 4/28/2018.    HISTORY:  History of subdural hematoma, fall.     TECHNIQUE: 5 mm thick axial CT images of the head were acquired  without IV contrast material.    FINDINGS:  There is mild diffuse cerebral volume loss. There are  subtle patchy areas of decreased density in the cerebral white  matter  bilaterally that are consistent with sequela of chronic small vessel  ischemic disease.     The ventricles and basal cisterns are within normal limits in  configuration given the degree of cerebral volume loss.  There is no  midline shift. There are no extra-axial fluid collections.     No intracranial hemorrhage, mass or recent infarct.    The visualized paranasal sinuses are well-aerated. There is no  mastoiditis. There are no fractures of the visualized bones.       Impression    IMPRESSION:  Diffuse cerebral volume loss and cerebral white matter  changes consistent with chronic small vessel ischemic disease. No  evidence for acute intracranial pathology. This represents interval  resolution of the right cerebral convexity subdural collection noted  on the comparison MRI.    Radiation dose for this scan was reduced using automated exposure  control, adjustment of the mA and/or kV according to patient size, or  iterative reconstruction technique.    MARLO SANDERS MD   CT Abdomen Pelvis w Contrast    Narrative    Exam: CT ABDOMEN PELVIS W CONTRAST  9/29/2018 7:23 AM    History: Fall with pain over the RIGHT upper quadrant.    Comparison: 10/7/2016    Technique: Volumetric acquisition with reconstruction in the axial,  coronal planes through the abdomen and pelvis with contrast. Radiation  dose for this scan was reduced using automated exposure control,  adjustment of the mA and/or kV according to patient size, or iterative  reconstruction technique.    Contrast: 95 mL Isovue-370    Findings:   Lung Bases: Very mild peripheral opacities in the posterior RIGHT  lower lobe. Lung bases are otherwise clear. No pleural or pericardial  effusion.    Abdomen: Hepatic steatosis, liver otherwise appears normal. Spleen,  adrenal glands, kidneys, pancreas and gallbladder appear normal.    No areas of bowel wall thickening or bowel dilatation. No free fluid.  No abdominal or pelvic lymphadenopathy. Pelvic structures  are  unremarkable. Atherosclerotic calcifications of the abdominal aorta  and its branch vessels without aneurysmal dilatation.    Bones: Mildly displaced acute fractures involving the RIGHT seventh,  eighth and ninth ribs as well as chronic fractures involving multiple  ribs on both sides. There may be additional rib fractures more  superiorly, but these are not included in this study.      Impression    Impression:   1. Mildly displaced acute fractures involving the RIGHT seventh,  eighth and ninth ribs. Additional rib fractures may be present, but  are not included in this study. Mild underlying opacities in the RIGHT  lower lobe may represent mild pulmonary contusion.  2. Hepatic steatosis.    VIVIAN SHELDON MD   Ribs XR, unilat 3 views + PA chest, right    Narrative    RIGHT RIBS AND CHEST THREE VIEWS  9/29/2018 8:01 AM     COMPARISON:  Two-view chest x-ray 3/21/2019.    HISTORY: fall rib fractures;     FINDINGS: There are nondisplaced fractures of the right fifth and  eighth ribs posteriorly and moderately displaced fractures of the  right sixth and seventh ribs posteriorly. No other rib fractures  noted. The cardiac silhouette, pulmonary vasculature, lungs and  pleural spaces are within normal limits.      Impression    IMPRESSION: Clear lungs. Right rib fractures as detailed above.    MARLO SANDERS MD[AM1.1]          Revision History        User Key Date/Time User Provider Type Action    > AM1.2 10/4/2018  3:51 PM Jude Duarte DO Physician Sign     AM1.3 10/4/2018  3:17 PM Jude Duarte DO Physician      AM1.1 10/4/2018  3:16 PM Jude Duarte DO Physician                      Consult Notes      Consults by Adriana Ledesma LSW at 10/3/2018  2:36 PM     Author:  Adriana Ledesma LSW Service:  Social Work Author Type:      Filed:  10/3/2018  2:36 PM Date of Service:  10/3/2018  2:36 PM Creation Time:  10/3/2018  2:32 PM    Status:  Signed  ":  Adriana Ledesma LSW ()     Consult Orders:    1. Care Transition RN/SW IP Consult [[id number]] ordered by Gray Burns MD at 10/02/18 1353                Care Transition Initial Assessment - SW  Reason For Consult: discharge planning, facility placement  Met with: Patient    Principal Problem:    Hematemesis  Active Problems:    GI bleed    Alcohol abuse    Rib Fractures (right 7th, 8th, 9th) -- occured 9/9/2018    Alcoholic hepatitis without ascites    Acute blood loss anemia    Esophageal varices (EGD with 6 varices banded 4/27/18)    Essential hypertension    COPD (chronic obstructive pulmonary disease) (H)    Smoker    JANIS on CPAP    Chronic low back pain       DATA  Lives With: other (see comments) (roommate)  Living Arrangements: house  Who is your support system?: Sibling(s)  Identified issues/concerns regarding health management: PT recommends TCU. Pt reports he has been to [facility] in the past following a back surgery. He went to this facility because his mother was a patient there. She no longer is. He would not chose to return there. He was hesitant to agree to TCU. He reports he lives with a roommate who he thinks could help him at home but works outside of the home 8 hours a day. Explained TCU. Pt is in agreement to referrals \"in this area.\" Did explain to pt some facilities may decline due to ETOH use.      Transportation Available: car (Pt drives at baseline.)    ASSESSMENT  Cognitive Status: Alert and oriented per nursing.  Concerns to be addressed: On-going discharge planning. On-going ETOH use may be a barrier to placement.     PLAN  Financial costs for the patient includes: None.  Patient given options and choices for discharge: Yes.  Patient/family is agreeable to the plan? YES  Patient Goals and Preferences: TCU.  Patient anticipates discharging to: TCU.    JULIA John, LGSW  [id number][JJ1.1]           Revision History        User Key " Date/Time User Provider Type Action    > JJ1.1 10/3/2018  2:36 PM Adriana Ledesma LSW  Sign            Consults by Rosendo Shaw MD at 9/29/2018 11:00 AM     Author:  Rosendo Shaw MD Service:  Gastroenterology Author Type:  Physician    Filed:  9/29/2018 11:50 AM Date of Service:  9/29/2018 11:00 AM Creation Time:  9/29/2018 11:43 AM    Status:  Signed :  Rosendo Shaw MD (Physician)     Consult Orders:    1. Gastroenterology IP Consult: Vomited blood, and alcoholic with Esophageal varices; Consultant may enter orders: Yes; Patient to be seen: ASAP - within 4 hours; Call back #: Call Dr. Burns at [phone] if questions; Requested Provider: Katy [[id number]] ordered by Gray Burns MD at 09/29/18 0837                [facility]  Gastroenterology Consultation         Jim Richard  [address] [id number]  63 year old male    Admission Date/Time: 9/29/2018  Primary Care Provider: Talon Elias  Referring / Attending Physician: Dr. Burns    We were asked to see the patient in consultation by Dr. Burns for evaluation of GI bleed small amount of hematemesis and melena.      CC: Hematemesis    HPI:  Jim Richard is a 63 year old male who who was admitted via ER this morning patient has been drinking heavily drinks about 0.75 letter of hard liquor every day patient has history of alcohol withdrawal history of alcohol drinking problem patient has known history of variceal bleeding in the past.  Patient was complaining of black stools yesterday and this morning patient noticed small amount of blood in his vomitus.  Patient denied any history of change in mental status with above-mentioned symptom patient has been complaining of severe pain on the left side of the chest patient fall patient has a fracture of his ribs.  Patient has been drinking heavily for last 3 weeks.  Patient is  significantly anxious patient is in withdrawal patient is tremulous shaky hyper and also somewhat tachycardic.  Patient is significantly tender on the lateral left side of his chest wall.  Patient denied any history of fever chills cough or any other significant systemic complaints.  Awake    ROS: A comprehensive ten point review of systems was negative aside from those in mentioned in the HPI.      PAST MED HX:  I have reviewed this patient's medical history and updated it with pertinent information if needed.   Past Medical History:   Diagnosis Date     Alcoholism (H) 1/14/2013    had treatment at [facility]     Anxiety      Coronary artery disease 09/09/2014    Nuclear stress test with slight fixed defect inferiorly, no ischemia     Depression     w/anxiety     Esophageal varices with bleeding 04/28/2018    6 varices banded on EGD     Heart attack      Hepatomegaly     Fatty liver, chronic alcoholic     Hyperlipemia      Hypertension      JANIS on CPAP      Peripheral vascular disease (H)      PVD (peripheral vascular disease) (H)      SDH (subdural hematoma) (H) 04/26/2018    Right side, resolved spontaneously     Spinal stenosis        MEDICATIONS:   Prior to Admission Medications   Prescriptions Last Dose Informant Patient Reported? Taking?   ATORVASTATIN CALCIUM PO 9/28/2018 at Unknown time Self Yes Yes   Sig: Take 10 mg by mouth At Bedtime    DULoxetine (CYMBALTA) 60 MG EC capsule 9/28/2018 at Unknown time Self No Yes   Sig: Take 1 capsule (60 mg) by mouth daily   MIRTAZAPINE PO 9/28/2018 at Unknown time Self Yes Yes   Sig: Take 30 mg by mouth At Bedtime   QUETIAPINE FUMARATE PO PRN Self Yes Yes   Sig: Take 50 mg by mouth 3 times daily as needed   TAMSULOSIN HCL PO 9/28/2018 at Unknown time Self Yes Yes   Sig: Take 0.4 mg by mouth daily   TRAZODONE HCL PO PRN Self Yes Yes   Sig: Take 100 mg by mouth nightly as needed (Takes 2 x 100mg for a total of 200mg at bedtime)   albuterol (PROAIR HFA/PROVENTIL  HFA/VENTOLIN HFA) 108 (90 BASE) MCG/ACT Inhaler PRn Self Yes Yes   Sig: Inhale 2 puffs into the lungs every 6 hours as needed    fluticasone-vilanterol (BREO ELLIPTA) 200-25 MCG/INH oral inhaler PRN Self Yes Yes   Sig: Inhale 1 puff into the lungs daily as needed    hydrOXYzine (ATARAX) 25 MG tablet PRN at Unknown time Self No Yes   Sig: Take 2 tablets (50 mg) by mouth nightly as needed (sleep)   methocarbamol (ROBAXIN) 750 MG tablet PRN at Unknown time Self No Yes   Sig: Take 1 tablet (750 mg) by mouth 3 times daily as needed for muscle spasms   omeprazole (PRILOSEC) 40 MG capsule Past Month at Unknown time Self No Yes   Sig: Take 1 capsule (40 mg) by mouth 2 times daily (before meals) Take 30-60 minutes before a meal.   spironolactone-hydrochlorothiazide (ALDACTAZIDE) 25-25 MG per tablet 9/28/2018 at Unknown time Self Yes Yes   Sig: Take 1 tablet by mouth daily      Facility-Administered Medications: None       ALLERGIES: No Known Allergies    SOCIAL HISTORY:  Social History   Substance Use Topics     Smoking status: Current Every Day Smoker     Packs/day: 1.00     Types: Cigarettes     Smokeless tobacco: Never Used      Comment: Chantix caused nightmares     Alcohol use Yes      Comment: 3/4th liter of vodka daily       FAMILY HISTORY:  Family History   Problem Relation Age of Onset     Mental Illness Son      Coronary Artery Disease Early Onset Mother      Substance Abuse Father      Lung Cancer Father      Substance Abuse Brother      Unknown/Adopted No family hx of      Depression No family hx of      Anxiety Disorder No family hx of      Schizophrenia No family hx of      Bipolar Disorder No family hx of      Suicide No family hx of      Dementia No family hx of      Anthony Disease No family hx of      Parkinsonism No family hx of      Autism Spectrum Disorder No family hx of      Intellectual Disability (Mental Retardation) No family hx of        PHYSICAL EXAM:   General alert tremulous restless  shaky  Vital Signs with Ranges  Temp: 97.6  F (36.4  C) Temp src: Oral BP: 140/74 Pulse: 86 Heart Rate: 87 Resp: 22 SpO2: 97 % O2 Device: Nasal cannula Oxygen Delivery: 3 LPM       Constitutional: healthy, alert and no distress   Cardiovascular: negative, PMI normal. No lifts, heaves, or thrills. RRR. No murmurs, clicks gallops or rub  Respiratory: negative, Percussion normal. Good diaphragmatic excursion. Lungs clear  Head: Normocephalic. No masses, lesions, tenderness or abnormalities  Neck: Neck supple. No adenopathy. Thyroid symmetric, normal size,, Carotids without bruits.  Abdomen: Abdomen soft, non-tender. BS normal. No masses, organomegaly  SKIN: no suspicious lesions or rashes          ADDITIONAL COMMENTS:   I reviewed the patient's new clinical lab test results.   Recent Labs   Lab Test  09/29/18   0550  05/21/18 2247 05/02/18   0635  04/30/18   0612   WBC  6.1  6.1  5.0  5.3   HGB  10.1*  8.2*  7.4*  8.1*   MCV  74*  78  84  85   PLT  207  224  131*  134*   INR  1.10  1.08   --   1.13     Recent Labs   Lab Test  09/29/18   0550  05/21/18 2247 05/02/18   0635   POTASSIUM  3.4  3.6  3.5   CHLORIDE  106  107  105   CO2  23  27  24   BUN  16  15  12   ANIONGAP  13  7  8     Recent Labs   Lab Test  09/29/18   0550  05/21/18 2247 05/02/18   0635   03/21/18   1555   09/25/14   0510   06/12/12   0550   ALBUMIN  3.2*  3.2*  2.5*   < >  2.9*   < >   --    < >   --    BILITOTAL  1.0  0.3  0.7   < >  0.9   < >   --    < >   --    ALT  60  23  40   < >  74*   < >   --    < >   --    AST  140*  42  53*   < >  136*   < >   --    < >   --    PROTEIN   --    --    --    --    --    --   Negative   --   Negative   LIPASE  585*   --    --    --   316   --    --    --    --     < > = values in this interval not displayed.       I reviewed the patient's new imaging results.        CONSULTATION ASSESSMENT AND PLAN:    Principal Problem:      Alcoholic hepatitis, alcohol withdrawal, significant alcoholic toxicity and  upper GI bleed.  63-year-old gentleman with known history of heavy alcohol consumption history of upper GI bleed with variceal bleed with recent fall about 3 weeks ago with left-sided rib fractures and heavy alcohol consumption with 0.3 alcohol level currently going through withdrawal and episode of vomiting this morning with small amount of blood patient findings are worrisome for possible recurrent portal hypertension and variceal bleed however symptoms are less suggestive of variceal bleed more of gastritis and peptic ulcer disease.  Patient has anemia.  I will recommend him to be evaluated with urgent upper GI endoscopy and variceal treatment if needed.  Continue on IV Protonix IV octreotide.  Continue on serial monitoring of hemoglobin n.p.o.  I do believe patient is currently going through withdrawal patient is at significantly high risk for DTs and alcohol withdrawal problem along with significant pain due to his rib fracture and known history of chronic pain syndrome given current findings patient will need close monitoring and management of his pain medicine along with alcohol withdrawal problem.  Thank you very much for letting me participate in his care I will continue to follow him closely from GI standpoint.          Rosendo Shaw MD, [facility]  [facility].  Office: [phone]  Cell : [phone][AB1.1]     Revision History        User Key Date/Time User Provider Type Action    > AB1.1 9/29/2018 11:50 AM Rosendo Shaw MD Physician Sign                     Progress Notes - Physician (Notes from 10/01/18 through 10/04/18)      Progress Notes by Adriana Ledesma LSW at 10/4/2018  3:28 PM     Author:  Adriana Ledesma LSW Service:  Social Work Author Type:      Filed:  10/4/2018  3:31 PM Date of Service:  10/4/2018  3:28 PM Creation Time:  10/4/2018  3:28 PM    Status:  Signed :  Adriana Ledesma LSW (Social  Worker)         D: DANIELE following for discharge planning.   I[facility] felt pt would be a better fit at [facility] so they sent the referral there. Pt in agreement with this. SW explained he will be billed for the cost of the ride. Orders faxed and facility updated. Pt updated his brother.   P: discharge today.     JULIA John, LGSW  [id number]    PAS-RR    D: Per DHS regulation, SW completed and submitted PAS-RR to [place] [employer] Direct Connect via the Senior LinkAge Line.  PAS-RR confirmation # is : [id number]    I: SW spoke with pt and they are aware a PAS-RR has been submitted.  SW reviewed with pt that they may be contacted for a follow up appointment within 10 days of hospital discharge if their SNF stay is < 30 days.  Contact information for [facility] LinkAge Line was also provided.    A: Pt verbalized understanding.    P: Further questions may be directed to [facility] Line at #[phone], option #4 for PAS-RR staff.[JJ1.1]       Revision History        User Key Date/Time User Provider Type Action    > JJ1.1 10/4/2018  3:31 PM Adriana Ledesma LSW  Sign            Progress Notes by Carol Lowe LPN at 10/4/2018  3:18 PM     Author:  Carol Lowe LPN Service:  Ancillary,Case Management Author Type:  Coordinator    Filed:  10/4/2018  3:18 PM Date of Service:  10/4/2018  3:18 PM Creation Time:  10/4/2018  3:18 PM    Status:  Signed :  Carol Lowe LPN (Coordinator)         [id number] done. October 04th, 2018[SA1.1]     Revision History        User Key Date/Time User Provider Type Action    > SA1.1 10/4/2018  3:18 PM Carol Lowe LPN Coordinator Sign            Progress Notes by Mauricio Ramírez MD at 10/3/2018  1:15 PM     Author:  Mauricio Ramírez MD Service:  Hospitalist Author Type:  Physician    Filed:  10/3/2018  1:20 PM Date of Service:   10/3/2018  1:15 PM Creation Time:  10/3/2018  1:15 PM    Status:  Signed :  Mauricio Ramírez MD (Physician)         [facility]    Hospitalist Progress Note      Assessment & Plan   Jim Richard is a 63 year old male who was admitted on 9/29/2018 with ongoing alcohol abuse and vomited blood.  No active bleeding on EGD.  No further hematemesis.    He is in simple alcohol withdrawal and is being treated with diazepam.    Hematemesis, upper gastrointestinal hemorrhage   Acute blood loss anemia  Esophageal varices (EGD with 6 varices banded 4/27/18)  Stable/resolved  EGD 9/29/18 with grade I varices and no active bleeding  Hemoglobin  stable    Alcohol abuse now in uncomplicated alcohol withdrawal   No diazepam given overnight.  He looks better this morning- no tremor, less anxious.  Continue diazepam per CIWA scoring    Hypokalemia and hypomagnesemia   Replace per protocol    Rib Fractures (right 7th, 8th, 9th) -- occured 9/9/2018  Stable     Alcoholic hepatitis without ascites  Repeat LFT's in morning     Essential hypertension  Stable     COPD (chronic obstructive pulmonary disease)  Smoker  Stable     JANIS on CPAP  Stable     Chronic low back pain  Oxycodone to 5 mg qid prn    Urinary retention  Leger placed  Continue Flomax to 0.8 mg daily  Voiding trial today    DVT Prophylaxis: Pneumatic Compression Devices  Code Status: DNR/DNI    Disposition: Expected discharge  TBD    Mauricio Ramírez MD  Text Page (7am - 6pm)    Interval History   Having some expected rib pain from the fractures  No shortness of breath     -Data reviewed today: I reviewed all new labs and imaging results over the last 24 hours. I personally reviewed no images or EKG's today.    Physical Exam   Temp: 98.1  F (36.7  C) Temp src: Oral BP: 138/84   Heart Rate: 76 Resp: 16 SpO2: 94 % O2 Device: None (Room air) Oxygen Delivery: 1 LPM  There were no vitals filed for this visit.  Vital Signs with Ranges  Temp:   [98.1  F (36.7  C)-99.1  F (37.3  C)] 98.1  F (36.7  C)  Heart Rate:  [71-91] 76  Resp:  [15-18] 16  BP: (113-155)/(70-86) 138/84  SpO2:  [91 %-96 %] 94 %  I/O last 3 completed shifts:  In: 760 [P.O.:760]  Out: 1325 [Urine:1325]    Constitutional: Awake, alert, cooperative, no apparent distress- looks weak but less acutely ill appearing today  Respiratory: Clear to auscultation bilaterally, no crackles or wheezing  Cardiovascular: Regular rate and rhythm, normal S1 and S2, and no murmur noted  GI: Normal bowel sounds, soft, non-distended, non-tender  Skin/Integumen: No rashes, no cyanosis, no edema  Other: No UE tremor    Medications       atorvastatin (LIPITOR) tablet 10 mg  10 mg Oral At Bedtime     DULoxetine  60 mg Oral Daily     fluticasone-vilanterol  1 puff Inhalation Daily     folic acid  1 mg Oral Daily     spironolactone  25 mg Oral Daily    And     hydrochlorothiazide  25 mg Oral Daily     influenza vaccine adult (product based on age)  0.5 mL Intramuscular Prior to discharge     mirtazapine (REMERON) tablet 30 mg  30 mg Oral At Bedtime     multivitamin, therapeutic with minerals  1 tablet Oral Daily     nicotine  1 patch Transdermal Daily     nicotine   Transdermal Daily     omeprazole  40 mg Oral BID AC     tamsulosin (FLOMAX) capsule 0.8 mg  0.8 mg Oral Daily       Data     Recent Labs  Lab 10/03/18  0758 10/02/18  1745 10/02/18  0745 10/01/18  0730 09/30/18  0050  09/29/18  0550   WBC  --   --  4.4 4.7  --   --  6.1   HGB  --   --  8.4* 8.4* 8.3*  < > 10.1*   MCV  --   --  75* 76*  --   --  74*   PLT  --   --  104* 105*  --   --  207   INR  --   --   --   --   --   --  1.10     --   --  138  --   --  142   POTASSIUM 3.9 4.3 3.3* 3.3*  --   --  3.4   CHLORIDE 105  --   --  104  --   --  106   CO2 28  --   --  28  --   --  23   BUN 9  --   --  10  --   --  16   CR 0.85  --   --  0.80  --   --  0.79   ANIONGAP 4  --   --  6  --   --  13   KEV 8.5  --   --  8.2*  --   --  8.1*   GLC 92  --   --   111*  --   --  71   ALBUMIN 2.6*  --   --   --   --   --  3.2*   PROTTOTAL 6.1*  --   --   --   --   --  7.2   BILITOTAL 1.0  --   --   --   --   --  1.0   ALKPHOS 297*  --   --   --   --   --  332*   ALT 64  --   --   --   --   --  60   AST 97*  --   --   --   --   --  140*   LIPASE  --   --   --   --   --   --  585*   < > = values in this interval not displayed.    No results found for this or any previous visit (from the past 24 hour(s)).[CA1.1]     Revision History        User Key Date/Time User Provider Type Action    > CA1.1 10/3/2018  1:20 PM Maruicio Ramírez MD Physician Sign            Progress Notes by Mauricio Ramírez MD at 10/2/2018  3:33 PM     Author:  Mauricio Ramírez MD Service:  Hospitalist Author Type:  Physician    Filed:  10/2/2018  3:40 PM Date of Service:  10/2/2018  3:33 PM Creation Time:  10/2/2018  3:33 PM    Status:  Signed :  Mauricio Ramírez MD (Physician)         [facility]    Hospitalist Progress Note      Assessment & Plan   Jim Richard is a 63 year old male who was admitted on 9/29/2018 with ongoing alcohol abuse and vomited blood.    Hematemesis, upper gastrointestinal hemorrhage   Acute blood loss anemia  Esophageal varices (EGD with 6 varices banded 4/27/18)  Stable/resolved  EGD 9/29/18 with grade I varices and no active bleeding  Hemoglobin  stable    Alcohol abuse now in Alcohol withdrawal   Continue diazepam per CIWA scoring    Hypokalemia and hypomagnesemia   Replace per protocol    Rib Fractures (right 7th, 8th, 9th) -- occured 9/9/2018  Stable     Alcoholic hepatitis without ascites  Repeat LFT's in morning     Essential hypertension  Stable     COPD (chronic obstructive pulmonary disease)  Smoker  Stable     JANIS on CPAP  Stable     Chronic low back pain  Oxycodone to 5 mg qid prn    Urinary retention  Leger placed  Continue Flomax to 0.8 mg daily  Voiding trial soon      DVT Prophylaxis: Pneumatic Compression  Devices  Code Status: DNR/DNI    Disposition: Expected discharge  TBD    Mauricio Ramírez MD  Text Page (7am - 6pm)    Interval History       -Data reviewed today: I reviewed all new labs and imaging results over the last 24 hours. I personally reviewed no images or EKG's today.    Physical Exam   Temp: 98.1  F (36.7  C) Temp src: Oral BP: 120/68   Heart Rate: 95 Resp: 16 SpO2: 91 % O2 Device: None (Room air) Oxygen Delivery: 1 LPM  There were no vitals filed for this visit.  Vital Signs with Ranges  Temp:  [97.9  F (36.6  C)-99.4  F (37.4  C)] 98.1  F (36.7  C)  Heart Rate:  [] 95  Resp:  [16-18] 16  BP: (119-149)/(59-86) 120/68  SpO2:  [89 %-98 %] 91 %  I/O last 3 completed shifts:  In: 480 [P.O.:480]  Out: 2300 [Urine:2300]    Constitutional: Awake, alert, cooperative, no apparent distress but looks weak and acutely ill  Respiratory: Clear to auscultation bilaterally, no crackles or wheezing  Cardiovascular: Regular rate and rhythm, normal S1 and S2, and no murmur noted  GI: Normal bowel sounds, soft, non-distended, non-tender  Skin/Integumen: No rashes, no cyanosis, no edema  Other: Mild UE tremor    Medications       atorvastatin (LIPITOR) tablet 10 mg  10 mg Oral At Bedtime     DULoxetine  60 mg Oral Daily     fluticasone-vilanterol  1 puff Inhalation Daily     folic acid  1 mg Oral Daily     spironolactone  25 mg Oral Daily    And     hydrochlorothiazide  25 mg Oral Daily     influenza vaccine adult (product based on age)  0.5 mL Intramuscular Prior to discharge     mirtazapine (REMERON) tablet 30 mg  30 mg Oral At Bedtime     multivitamin, therapeutic with minerals  1 tablet Oral Daily     nicotine  1 patch Transdermal Daily     nicotine   Transdermal Daily     omeprazole  40 mg Oral BID AC     tamsulosin (FLOMAX) capsule 0.8 mg  0.8 mg Oral Daily     thiamine  100 mg Oral Daily       Data     Recent Labs  Lab 10/02/18  0745 10/01/18  0730 09/30/18  0050  09/29/18  0550   WBC 4.4 4.7  --   --   6.1   HGB 8.4* 8.4* 8.3*  < > 10.1*   MCV 75* 76*  --   --  74*   * 105*  --   --  207   INR  --   --   --   --  1.10   NA  --  138  --   --  142   POTASSIUM 3.3* 3.3*  --   --  3.4   CHLORIDE  --  104  --   --  106   CO2  --  28  --   --  23   BUN  --  10  --   --  16   CR  --  0.80  --   --  0.79   ANIONGAP  --  6  --   --  13   KEV  --  8.2*  --   --  8.1*   GLC  --  111*  --   --  71   ALBUMIN  --   --   --   --  3.2*   PROTTOTAL  --   --   --   --  7.2   BILITOTAL  --   --   --   --  1.0   ALKPHOS  --   --   --   --  332*   ALT  --   --   --   --  60   AST  --   --   --   --  140*   LIPASE  --   --   --   --  585*   < > = values in this interval not displayed.    No results found for this or any previous visit (from the past 24 hour(s)).[CA1.1]     Revision History        User Key Date/Time User Provider Type Action    > CA1.1 10/2/2018  3:40 PM Mauricio Ramírez MD Physician Sign            Progress Notes by Michaela Zarate PT at 10/2/2018 11:59 AM     Author:  Michaela Zarate PT Service:  (none) Author Type:  Physical Therapist    Filed:  10/2/2018 11:59 AM Date of Service:  10/2/2018 11:59 AM Creation Time:  10/2/2018 11:59 AM    Status:  Signed :  Michaela Zarate PT (Physical Therapist)            10/02/18 1107   Quick Adds   Type of Visit Initial PT Evaluation   Living Environment   Lives With other (see comments)  (Rommate)   Living Arrangements house  ([place]house)   Number of Stairs to Enter Home 6  (1 railing.)   Number of Stairs Within Home 6  (1 railing. )   Transportation Available car  (Pt drives at baseline.)   Living Environment Comment Pt reports his roommate does the cleaning.    Self-Care   Usual Activity Tolerance good   Current Activity Tolerance fair   Regular Exercise no   Equipment Currently Used at Home none   Functional Level Prior   Ambulation 0-->independent   Transferring 0-->independent   Fall history within last six months yes   Number of times patient has  "fallen within last six months 3   General Information   Onset of Illness/Injury or Date of Surgery - Date 09/29/18   Referring Physician Gray Burns MD   Patient/Family Goals Statement None stated.    Pertinent History of Current Problem (include personal factors and/or comorbidities that impact the POC) 64 y/o male admitted with hematemesis. PMH including alcoholism and varices, anxiety, CAD, Depression, heart attack, HTN, PVD.    Precautions/Limitations fall precautions   General Observations Pt in supine upon arrival of therapist.    General Info Comments Up with assist.    Cognitive Status Examination   Orientation person  (\"Monday May 1st\")   Level of Consciousness lethargic/somnolent   Follows Commands and Answers Questions 75% of the time   Personal Safety and Judgment impaired   Pain Assessment   Patient Currently in Pain (R rib pain: 7/10)   Integumentary/Edema   Integumentary/Edema Comments No deficits noted.    Posture    Posture Comments Noted forward head and shoulder posture upon standing at FWW.    Range of Motion (ROM)   ROM Comment B LEs WFL.    Strength   Strength Comments B LEs grossly at least 3/5 with ROM sitting EOB, pt had difficulty following cues to formally assess.    Bed Mobility   Bed Mobility Comments Supine-sit, Sarah.    Transfer Skills   Transfer Comments Sit <> stand with FWW and Sarah of 2.    Gait   Gait Comments Pt amb a couple steps to bedside chair with FWW and Sarah of 2.    Balance   Balance Comments Noted good sitting balance and fair standing/dynamic balance.    Sensory Examination   Sensory Perception Comments Pt denies numbness/tingling in B LEs and UEs.    General Therapy Interventions   Planned Therapy Interventions bed mobility training;gait training;ROM;strengthening;transfer training   Clinical Impression   Criteria for Skilled Therapeutic Intervention yes, treatment indicated   PT Diagnosis Difficulty with functional mobility.    Influenced by the following " "impairments Generalized weakness, Decreased activity tolerance, Impaired balance,    Functional limitations due to impairments Limited functional mobility requiring AD and assist of 2.    Clinical Presentation Stable/Uncomplicated   Clinical Presentation Rationale Based on PMH, current presentation, and social support.    Clinical Decision Making (Complexity) Low complexity   Therapy Frequency` daily   Predicted Duration of Therapy Intervention (days/wks) 4 days   Anticipated Discharge Disposition Transitional Care Facility   Risk & Benefits of therapy have been explained Yes   Patient, Family & other staff in agreement with plan of care Yes   [facility] TM \"6 Clicks\"   2016, Trustees of [facility], under license to [employer].  All rights reserved.   6 Clicks Short Forms Basic Mobility Inpatient Short Form   [facility] AM-PAC  \"6 Clicks\" V.2 Basic Mobility Inpatient Short Form   1. Turning from your back to your side while in a flat bed without using bedrails? 3 - A Little   2. Moving from lying on your back to sitting on the side of a flat bed without using bedrails? 2 - A Lot   3. Moving to and from a bed to a chair (including a wheelchair)? 2 - A Lot   4. Standing up from a chair using your arms (e.g., wheelchair, or bedside chair)? 2 - A Lot   5. To walk in hospital room? 1 - Total   6. Climbing 3-5 steps with a railing? 1 - Total   Basic Mobility Raw Score (Score out of 24.Lower scores equate to lower levels of function) 11   Total Evaluation Time   Total Evaluation Time (Minutes) 8[JH1.1]        Revision History        User Key Date/Time User Provider Type Action    > JH1.1 10/2/2018 11:59 AM Michaela Zarate PT Physical Therapist Sign            Progress Notes by Gray Burns MD at 10/1/2018  4:22 PM     Author:  Gray Burns MD Service:  Hospitalist Author Type:  Physician    Filed:  10/1/2018  4:26 PM Date of Service:  10/1/2018  4:22 PM Creation Time: "  10/1/2018  4:22 PM    Status:  Signed :  Gray Vincent MD (Physician)         [facility]    Hospitalist Progress Note    Assessment & Plan   Jim Richard is a 63 year old male who was admitted on 9/29/2018 with ongoing alcohol abuse and vomited blood:    Impression:   Principal Problem:    Hematemesis, UGI bleed -- clinically appears better   -- EGD 9/29/18 with grade I varices and no active bleeding   -- hgb stable  Active Problems:    Alcohol abuse -- now in Alcohol withdrawal (as expected)   -- withdrawal protocol, and will cancel scheduled valium    Rib Fractures (right 7th, 8th, 9th) -- occured 9/9/2018    Alcoholic hepatitis without ascites    Acute blood loss anemia    Esophageal varices (EGD with 6 varices banded 4/27/18)    Essential hypertension    COPD (chronic obstructive pulmonary disease) (H)    Smoker    JANIS on CPAP    Chronic low back pain   -- will decrease Oxycodone to 5 mg qid prn    Urinary retention   -- jiang placed   -- will try voiding trial tomorrow when more alert   -- increase Flomax to 0.8 mg daily      Plan:  Continue alcohol withdrawal protocol, cbc in AM. Will stop IV fluids and give 20 mg Lasix IV once to help with edema.  Also PT eval.  Anticipate discharge tomorrow if able to wean of O2 and safely ambulate, and alcohol withdrawal better    DVT Prophylaxis: Pneumatic Compression Devices  Code Status: DNR/DNI    Disposition: Expected discharge in 1 days once withdrawal sx's better. .    Gray Vincent MD  Pager [phone]  Cell Phone [phone]  Text Page (7am to 6pm)    Interval History   Less shaky today, but drowsy this AM, and on O2 and jiang placed 2nd to urinary retention.     Physical Exam   Temp: 99.4  F (37.4  C) Temp src: Oral BP: 140/86   Heart Rate: 110 Resp: 18 SpO2: 96 % O2 Device: Nasal cannula Oxygen Delivery: 2 LPM  There were no vitals filed for this visit.  Vital Signs with Ranges  Temp:  [96.2  F (35.7  C)-99.4   F (37.4  C)] 99.4  F (37.4  C)  Heart Rate:  [] 110  Resp:  [16-18] 18  BP: (123-173)/(70-86) 140/86  SpO2:  [93 %-98 %] 96 %  I/O last 3 completed shifts:  In: 1877 [P.O.:240; I.V.:1637]  Out: 2935 [Urine:2935]    # Pain Assessment:  Current Pain Score 10/1/2018   Patient currently in pain? denies   Pain score (0-10) -   Pain location -   Pain descriptors -   Jim gutierrez pain level was assessed and he currently denies pain.        Constitutional: Awake, alert, cooperative, no apparent distress  Respiratory: Clear to auscultation bilaterally, no crackles or wheezing  Cardiovascular: Regular rate and rhythm, normal S1 and S2, and no murmur noted  GI: Normal bowel sounds, soft, non-distended, non-tender  Extrem: No calf tenderness, 1+ bilateral ankle edema  Neuro: Ox3, no focal motor or sensory deficits but anxious and tremulous    Medications       atorvastatin (LIPITOR) tablet 10 mg  10 mg Oral At Bedtime     DULoxetine  60 mg Oral Daily     fluticasone-vilanterol  1 puff Inhalation Daily     folic acid  1 mg Oral Daily     spironolactone  25 mg Oral Daily    And     hydrochlorothiazide  25 mg Oral Daily     influenza vaccine adult (product based on age)  0.5 mL Intramuscular Prior to discharge     mirtazapine (REMERON) tablet 30 mg  30 mg Oral At Bedtime     multivitamin, therapeutic with minerals  1 tablet Oral Daily     nicotine  1 patch Transdermal Daily     nicotine   Transdermal Daily     omeprazole  40 mg Oral BID AC     tamsulosin (FLOMAX) capsule 0.4 mg  0.4 mg Oral Daily     thiamine  100 mg Oral Daily       Data     Recent Labs  Lab 10/01/18  0730 09/30/18  0050 09/29/18  1909  09/29/18  0550   WBC 4.7  --   --   --  6.1   HGB 8.4* 8.3* 7.5*  < > 10.1*   MCV 76*  --   --   --  74*   *  --   --   --  207   INR  --   --   --   --  1.10     --   --   --  142   POTASSIUM 3.3*  --   --   --  3.4   CHLORIDE 104  --   --   --  106   CO2 28  --   --   --  23   BUN 10  --   --   --  16   CR 0.80   --   --   --  0.79   ANIONGAP 6  --   --   --  13   KEV 8.2*  --   --   --  8.1*   *  --   --   --  71   ALBUMIN  --   --   --   --  3.2*   PROTTOTAL  --   --   --   --  7.2   BILITOTAL  --   --   --   --  1.0   ALKPHOS  --   --   --   --  332*   ALT  --   --   --   --  60   AST  --   --   --   --  140*   LIPASE  --   --   --   --  585*   < > = values in this interval not displayed.    Imaging:   No results found for this or any previous visit (from the past 24 hour(s)).[JM1.1]      Revision History        User Key Date/Time User Provider Type Action    > JM1.1 10/1/2018  4:26 PM Gray Burns MD Physician Sign            Progress Notes by Gloria De Leon PA-C at 10/1/2018  8:52 AM     Author:  Gloria De Leon PA-C Service:  Gastroenterology Author Type:  Physician Assistant - DAYANA    Filed:  10/1/2018  8:55 AM Date of Service:  10/1/2018  8:52 AM Creation Time:  10/1/2018  8:52 AM    Status:  Signed :  Gloria De Leon PA-C (Physician Assistant - C)         Status Post EGD on 9/29/2018 revealed no evidence of active GI bleed. Noted to have congested gastric mucosa and grad I esophageal varices with non bleeding duodenal ulcer.  Started on Protonix.  Hemoglobin has improved and stable at 8.4.  Will recommend to continue to monitor. If any concerns for other GI concerns please contact [facility]. GI will sign off. No active GI problems    Gloria De Leon PA-C  [facility]  [EK1.1]     Revision History        User Key Date/Time User Provider Type Action    > EK1.1 10/1/2018  8:55 AM Gloria De Leon PA-C Physician Assistant - C Sign                  Procedure Notes     No notes of this type exist for this encounter.         Progress Notes - Therapies (Notes from 10/01/18 through 10/04/18)      Progress Notes by Michaela Zarate, PT at 10/2/2018 11:59 AM     Author:  Michaela Zarate PT Service:  (none) Author Type:  Physical Therapist    Filed:  10/2/2018 11:59 AM  "Date of Service:  10/2/2018 11:59 AM Creation Time:  10/2/2018 11:59 AM    Status:  Signed :  Michaela Zarate PT (Physical Therapist)            10/02/18 1107   Quick Adds   Type of Visit Initial PT Evaluation   Living Environment   Lives With other (see comments)  (Rommate)   Living Arrangements house  ([facility])   Number of Stairs to Enter Home 6  (1 railing.)   Number of Stairs Within Home 6  (1 railing. )   Transportation Available car  (Pt drives at baseline.)   Living Environment Comment Pt reports his roommate does the cleaning.    Self-Care   Usual Activity Tolerance good   Current Activity Tolerance fair   Regular Exercise no   Equipment Currently Used at Home none   Functional Level Prior   Ambulation 0-->independent   Transferring 0-->independent   Fall history within last six months yes   Number of times patient has fallen within last six months 3   General Information   Onset of Illness/Injury or Date of Surgery - Date 09/29/18   Referring Physician Gray Burns MD   Patient/Family Goals Statement None stated.    Pertinent History of Current Problem (include personal factors and/or comorbidities that impact the POC) 64 y/o male admitted with hematemesis. PMH including alcoholism and varices, anxiety, CAD, Depression, heart attack, HTN, PVD.    Precautions/Limitations fall precautions   General Observations Pt in supine upon arrival of therapist.    General Info Comments Up with assist.    Cognitive Status Examination   Orientation person  (\"Monday May 1st\")   Level of Consciousness lethargic/somnolent   Follows Commands and Answers Questions 75% of the time   Personal Safety and Judgment impaired   Pain Assessment   Patient Currently in Pain (R rib pain: 7/10)   Integumentary/Edema   Integumentary/Edema Comments No deficits noted.    Posture    Posture Comments Noted forward head and shoulder posture upon standing at FWW.    Range of Motion (ROM)   ROM Comment B LEs WFL.    Strength " "  Strength Comments B LEs grossly at least 3/5 with ROM sitting EOB, pt had difficulty following cues to formally assess.    Bed Mobility   Bed Mobility Comments Supine-sit, Sarah.    Transfer Skills   Transfer Comments Sit <> stand with FWW and Sarah of 2.    Gait   Gait Comments Pt amb a couple steps to bedside chair with FWW and Sarah of 2.    Balance   Balance Comments Noted good sitting balance and fair standing/dynamic balance.    Sensory Examination   Sensory Perception Comments Pt denies numbness/tingling in B LEs and UEs.    General Therapy Interventions   Planned Therapy Interventions bed mobility training;gait training;ROM;strengthening;transfer training   Clinical Impression   Criteria for Skilled Therapeutic Intervention yes, treatment indicated   PT Diagnosis Difficulty with functional mobility.    Influenced by the following impairments Generalized weakness, Decreased activity tolerance, Impaired balance,    Functional limitations due to impairments Limited functional mobility requiring AD and assist of 2.    Clinical Presentation Stable/Uncomplicated   Clinical Presentation Rationale Based on PMH, current presentation, and social support.    Clinical Decision Making (Complexity) Low complexity   Therapy Frequency` daily   Predicted Duration of Therapy Intervention (days/wks) 4 days   Anticipated Discharge Disposition Transitional Care Facility   Risk & Benefits of therapy have been explained Yes   Patient, Family & other staff in agreement with plan of care Yes   [facility] [employer] TM \"6 Clicks\"   2016, Trustees of [facility], under license to [employer].  All rights reserved.   6 Clicks Short Forms Basic Mobility Inpatient Short Form   [facility] AM-PAC  \"6 Clicks\" V.2 Basic Mobility Inpatient Short Form   1. Turning from your back to your side while in a flat bed without using bedrails? 3 - A Little   2. Moving from lying on your back to sitting on the side of a flat bed without " using bedrails? 2 - A Lot   3. Moving to and from a bed to a chair (including a wheelchair)? 2 - A Lot   4. Standing up from a chair using your arms (e.g., wheelchair, or bedside chair)? 2 - A Lot   5. To walk in hospital room? 1 - Total   6. Climbing 3-5 steps with a railing? 1 - Total   Basic Mobility Raw Score (Score out of 24.Lower scores equate to lower levels of function) 11   Total Evaluation Time   Total Evaluation Time (Minutes) 8[JH1.1]        Revision History        User Key Date/Time User Provider Type Action    > JH1.1 10/2/2018 11:59 AM Michaela Zarate PT Physical Therapist Sign

## 2018-09-29 NOTE — IP AVS SNAPSHOT
MRN:[id number]                      After Visit Summary   9/29/2018    Jim Richard    MRN: [id number]           Thank you!     Thank you for choosing [facility] for your care. Our goal is always to provide you with excellent care. Hearing back from our patients is one way we can continue to improve our services. Please take a few minutes to complete the written survey that you may receive in the mail after you visit with us. Thank you!        Patient Information     Date Of Birth          1955        Designated Caregiver       Most Recent Value    Caregiver    Will someone help with your care after discharge? yes    Name of designated caregiver adia    Phone number of caregiver [phone]    Caregiver address same as pt      About your hospital stay     You were admitted on:  September 29, 2018 You last received care in the:  [place] [address] [facility]    You were discharged on:  October 5, 2018       Who to Call     For medical emergencies, please call 911.  For non-urgent questions about your medical care, please call your primary care provider or clinic, [phone]  For questions related to your surgery, please call your surgery clinic        Attending Provider     Provider Specialty    Efrain Morgan MD Emergency Medicine    Pancho, Adriana CALDERON MD Emergency Medicine    [facility], Gray Hicks MD Internal Medicine       Primary Care Provider Office Phone # Fax #    Talon Elias [phone]      After Care Instructions     Activity - Up with nursing assistance           Advance Diet as Tolerated       Follow this diet upon discharge: Orders Placed This Encounter      Regular Diet Adult            Fall precautions           General info for SNF       Length of Stay Estimate: Short Term Care: Estimated # of Days <30  Condition at Discharge: Improving  Level of care:skilled   Rehabilitation Potential: Good  Admission H&P remains valid and  "up-to-date: Yes  Recent Chemotherapy: N/A  Use Nursing Home Standing Orders: Yes            Mantoux instructions       Give two-step Mantoux (PPD) Per Facility Policy Yes                  Follow-up Appointments     Follow Up and recommended labs and tests       Follow up with PCP in 1 week ++ repeat Hgb and LFTs in 5 days                  Additional Services     Occupational Therapy Adult Consult       Evaluate and treat as clinically indicated.    Reason:  Weakness and deconditioning            Physical Therapy Adult Consult       Evaluate and treat as clinically indicated.    Reason:  Weakness and deconditioning                  Pending Results     No orders found from 9/27/2018 to 9/30/2018.            Statement of Approval     Ordered          10/05/18 1221  I have reviewed and agree with all the recommendations and orders detailed in this document.  EFFECTIVE NOW     Approved and electronically signed by:  Jude Duarte DO           10/04/18 1516  I have reviewed and agree with all the recommendations and orders detailed in this document.  EFFECTIVE NOW     Approved and electronically signed by:  Jude Duarte DO             Admission Information     Date & Time Provider Department Dept. Phone    9/29/2018 Gray Burns MD [place] [address] [facility] [phone]      Your Vitals Were     Blood Pressure Pulse Temperature Respirations Height Pulse Oximetry    151/86 (BP Location: Left arm) 86 98  F (36.7  C) (Oral) 16 1.702 m (5' 7\") 94%    BMI (Body Mass Index)                   29.76 kg/m2           [employer] Information     [employer] lets you send messages to your doctor, view your test results, renew your prescriptions, schedule appointments and more. To sign up, go to [email or web address] . Click on \"Log in\" on the left side of the screen, which will take you to the Welcome page. Then click on \"Sign up Now\" on the right side of the page.     You will be asked to enter the " access code listed below, as well as some personal information. Please follow the directions to create your username and password.     Your access code is: [email or web address]  Expires: 2019  4:56 PM     Your access code will  in 90 days. If you need help or a new code, please call your [facility] clinic or [phone].        Care EveryWhere ID     This is your Care EveryWhere ID. This could be used by other organizations to access your [facility] medical records  [id number]        Equal Access to Services     [facility]: Hadii fito reyes hadcorrina Solori, waaxda luqadaha, qaybta kaalmalamine lopez, stan vale . So [facility] [phone].    ATENCIÓN: Si habla español, tiene a thompson disposición servicios gratuitos de asistencia lingüística. Llame al [phone].    We comply with applicable federal civil rights laws and [place] laws. We do not discriminate on the basis of race, color, national origin, age, disability, sex, sexual orientation, or gender identity.               Review of your medicines      START taking        Dose / Directions    diazepam 5 MG tablet   Commonly known as:  VALIUM   Used for:  Anxiety        Dose:  2.5-5 mg   Take 0.5-1 tablets (2.5-5 mg) by mouth every 8 hours as needed for anxiety   Quantity:  10 tablet   Refills:  0       multivitamin, therapeutic with minerals Tabs tablet   Used for:  Alcoholic intoxication without complication (H)        Dose:  1 tablet   Take 1 tablet by mouth daily   Quantity:  30 each   Refills:  0       oxyCODONE IR 5 MG tablet   Commonly known as:  ROXICODONE        Dose:  5 mg   Take 1 tablet (5 mg) by mouth 4 times daily as needed for pain   Quantity:  10 tablet   Refills:  0         CONTINUE these medicines which have NOT CHANGED        Dose / Directions    albuterol 108 (90 Base) MCG/ACT inhaler   Commonly known as:  PROAIR HFA/PROVENTIL HFA/VENTOLIN HFA        Dose:  2 puff   Inhale 2 puffs into the lungs every 6 hours as needed    Refills:  0       ATORVASTATIN CALCIUM PO        Dose:  10 mg   Take 10 mg by mouth At Bedtime   Refills:  0       BREO ELLIPTA 200-25 MCG/INH inhaler   Generic drug:  fluticasone-vilanterol        Dose:  1 puff   Inhale 1 puff into the lungs daily as needed   Refills:  0       DULoxetine 60 MG EC capsule   Commonly known as:  CYMBALTA   Used for:  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)        Dose:  60 mg   Take 1 capsule (60 mg) by mouth daily   Quantity:  30 capsule   Refills:  0       hydrOXYzine 25 MG tablet   Commonly known as:  ATARAX        Dose:  50 mg   Take 2 tablets (50 mg) by mouth nightly as needed (sleep)   Quantity:  30 tablet   Refills:  0       methocarbamol 750 MG tablet   Commonly known as:  ROBAXIN        Dose:  750 mg   Take 1 tablet (750 mg) by mouth 3 times daily as needed for muscle spasms   Quantity:  15 tablet   Refills:  0       MIRTAZAPINE PO        Dose:  30 mg   Take 30 mg by mouth At Bedtime   Refills:  0       omeprazole 40 MG capsule   Commonly known as:  priLOSEC   Used for:  Hematemesis without nausea        Dose:  40 mg   Take 1 capsule (40 mg) by mouth 2 times daily (before meals) Take 30-60 minutes before a meal.   Quantity:  60 capsule   Refills:  1       QUETIAPINE FUMARATE PO        Dose:  50 mg   Take 50 mg by mouth 3 times daily as needed   Refills:  0       spironolactone-hydrochlorothiazide 25-25 MG per tablet   Commonly known as:  ALDACTAZIDE        Dose:  1 tablet   Take 1 tablet by mouth daily   Refills:  0       TAMSULOSIN HCL PO        Dose:  0.4 mg   Take 0.4 mg by mouth daily   Refills:  0       TRAZODONE HCL PO        Dose:  100 mg   Take 100 mg by mouth nightly as needed (Takes 2 x 100mg for a total of 200mg at bedtime)   Refills:  0            Where to get your medicines      Some of these will need a paper prescription and others can be bought over the counter. Ask your nurse if you have questions.     Bring a paper prescription for  each of these medications     diazepam 5 MG tablet    oxyCODONE IR 5 MG tablet       You don't need a prescription for these medications     multivitamin, therapeutic with minerals Tabs tablet                Protect others around you: Learn how to safely use, store and throw away your medicines at www.disposemymeds.org.        Information about OPIOIDS     PRESCRIPTION OPIOIDS: WHAT YOU NEED TO KNOW   We gave you an opioid (narcotic) pain medicine. It is important to manage your pain, but opioids are not always the best choice. You should first try all the other options your care team gave you. Take this medicine for as short a time (and as few doses) as possible.    Some activities can increase your pain, such as bandage changes or therapy sessions. It may help to take your pain medicine 30 to 60 minutes before these activities. Reduce your stress by getting enough sleep, working on hobbies you enjoy and practicing relaxation or meditation. Talk to your care team about ways to manage your pain beyond prescription opioids.    These medicines have risks:    DO NOT drive when on new or higher doses of pain medicine. These medicines can affect your alertness and reaction times, and you could be arrested for driving under the influence (DUI). If you need to use opioids long-term, talk to your care team about driving.    DO NOT operate heavy machinery    DO NOT do any other dangerous activities while taking these medicines.    DO NOT drink any alcohol while taking these medicines.     If the opioid prescribed includes acetaminophen, DO NOT take with any other medicines that contain acetaminophen. Read all labels carefully. Look for the word  acetaminophen  or  Tylenol.  Ask your pharmacist if you have questions or are unsure.    You can get addicted to pain medicines, especially if you have a history of addiction (chemical, alcohol or substance dependence). Talk to your care team about ways to reduce this risk.    All  opioids tend to cause constipation. Drink plenty of water and eat foods that have a lot of fiber, such as fruits, vegetables, prune juice, apple juice and high-fiber cereal. Take a laxative (Miralax, milk of magnesia, Colace, Senna) if you don t move your bowels at least every other day. Other side effects include upset stomach, sleepiness, dizziness, throwing up, tolerance (needing more of the medicine to have the same effect), physical dependence and slowed breathing.    Store your pills in a secure place, locked if possible. We will not replace any lost or stolen medicine. If you don t finish your medicine, please throw away (dispose) as directed by your pharmacist. The [place] Pollution [employer] has more information about safe disposal: https://www.pca.[facility].mn.us/living-green/managing-unwanted-medications             Medication List: This is a list of all your medications and when to take them. Check marks below indicate your daily home schedule. Keep this list as a reference.      Medications           Morning Afternoon Evening Bedtime As Needed    albuterol 108 (90 Base) MCG/ACT inhaler   Commonly known as:  PROAIR HFA/PROVENTIL HFA/VENTOLIN HFA   Inhale 2 puffs into the lungs every 6 hours as needed                                ATORVASTATIN CALCIUM PO   Take 10 mg by mouth At Bedtime   Last time this was given:  10 mg on 10/4/2018  9:21 PM                                BREO ELLIPTA 200-25 MCG/INH inhaler   Inhale 1 puff into the lungs daily as needed   Last time this was given:  1 puff on 10/5/2018  8:26 AM   Generic drug:  fluticasone-vilanterol                                diazepam 5 MG tablet   Commonly known as:  VALIUM   Take 0.5-1 tablets (2.5-5 mg) by mouth every 8 hours as needed for anxiety   Last time this was given:  10 mg on 10/5/2018  4:50 AM                                DULoxetine 60 MG EC capsule   Commonly known as:  CYMBALTA   Take 1 capsule (60 mg) by mouth daily   Last time  this was given:  60 mg on 10/5/2018  8:27 AM                                hydrOXYzine 25 MG tablet   Commonly known as:  ATARAX   Take 2 tablets (50 mg) by mouth nightly as needed (sleep)   Last time this was given:  50 mg on 10/4/2018  7:37 PM                                methocarbamol 750 MG tablet   Commonly known as:  ROBAXIN   Take 1 tablet (750 mg) by mouth 3 times daily as needed for muscle spasms   Last time this was given:  750 mg on 10/4/2018 11:49 PM                                MIRTAZAPINE PO   Take 30 mg by mouth At Bedtime   Last time this was given:  30 mg on 10/4/2018  9:21 PM                                multivitamin, therapeutic with minerals Tabs tablet   Take 1 tablet by mouth daily   Last time this was given:  1 tablet on 10/5/2018  8:27 AM                                omeprazole 40 MG capsule   Commonly known as:  priLOSEC   Take 1 capsule (40 mg) by mouth 2 times daily (before meals) Take 30-60 minutes before a meal.   Last time this was given:  40 mg on 10/5/2018  6:45 AM                                oxyCODONE IR 5 MG tablet   Commonly known as:  ROXICODONE   Take 1 tablet (5 mg) by mouth 4 times daily as needed for pain   Last time this was given:  5 mg on 10/5/2018  8:27 AM                                QUETIAPINE FUMARATE PO   Take 50 mg by mouth 3 times daily as needed   Last time this was given:  50 mg on 9/29/2018  4:38 PM                                spironolactone-hydrochlorothiazide 25-25 MG per tablet   Commonly known as:  ALDACTAZIDE   Take 1 tablet by mouth daily                                TAMSULOSIN HCL PO   Take 0.4 mg by mouth daily   Last time this was given:  0.8 mg on 10/5/2018  8:27 AM                                TRAZODONE HCL PO   Take 100 mg by mouth nightly as needed (Takes 2 x 100mg for a total of 200mg at bedtime)   Last time this was given:  200 mg on 10/2/2018 10:07 PM

## 2018-09-30 LAB
HGB BLD-MCNC: 8.3 G/DL (ref 13.3–17.7)
LACTATE BLD-SCNC: 1.1 MMOL/L (ref 0.7–2)

## 2018-09-30 PROCEDURE — 12000000 ZZH R&B MED SURG/OB

## 2018-09-30 PROCEDURE — C9113 INJ PANTOPRAZOLE SODIUM, VIA: HCPCS | Performed by: INTERNAL MEDICINE

## 2018-09-30 PROCEDURE — 99232 SBSQ HOSP IP/OBS MODERATE 35: CPT | Performed by: INTERNAL MEDICINE

## 2018-09-30 PROCEDURE — 25000125 ZZHC RX 250: Performed by: INTERNAL MEDICINE

## 2018-09-30 PROCEDURE — A9270 NON-COVERED ITEM OR SERVICE: HCPCS | Mod: GY | Performed by: INTERNAL MEDICINE

## 2018-09-30 PROCEDURE — 36415 COLL VENOUS BLD VENIPUNCTURE: CPT | Performed by: INTERNAL MEDICINE

## 2018-09-30 PROCEDURE — 25000132 ZZH RX MED GY IP 250 OP 250 PS 637: Mod: GY | Performed by: INTERNAL MEDICINE

## 2018-09-30 PROCEDURE — 83605 ASSAY OF LACTIC ACID: CPT | Performed by: INTERNAL MEDICINE

## 2018-09-30 PROCEDURE — 85018 HEMOGLOBIN: CPT | Performed by: INTERNAL MEDICINE

## 2018-09-30 PROCEDURE — 25000128 H RX IP 250 OP 636: Performed by: INTERNAL MEDICINE

## 2018-09-30 PROCEDURE — 25000125 ZZHC RX 250

## 2018-09-30 RX ORDER — GINSENG 100 MG
CAPSULE ORAL
Status: DISCONTINUED | OUTPATIENT
Start: 2018-09-30 | End: 2018-10-05 | Stop reason: HOSPADM

## 2018-09-30 RX ADMIN — FOLIC ACID 1 MG: 1 TABLET ORAL at 08:28

## 2018-09-30 RX ADMIN — Medication 100 MG: at 08:28

## 2018-09-30 RX ADMIN — Medication 10 MG: at 19:35

## 2018-09-30 RX ADMIN — Medication 10 MG: at 14:29

## 2018-09-30 RX ADMIN — OXYCODONE HYDROCHLORIDE 5 MG: 5 TABLET ORAL at 17:07

## 2018-09-30 RX ADMIN — Medication 10 MG: at 04:02

## 2018-09-30 RX ADMIN — BACITRACIN: 500 OINTMENT TOPICAL at 23:47

## 2018-09-30 RX ADMIN — LIDOCAINE HYDROCHLORIDE 10 ML: 20 JELLY TOPICAL at 12:58

## 2018-09-30 RX ADMIN — LIDOCAINE HYDROCHLORIDE 10 ML: 20 JELLY TOPICAL at 02:53

## 2018-09-30 RX ADMIN — HYDROXYZINE HYDROCHLORIDE 50 MG: 25 TABLET ORAL at 03:55

## 2018-09-30 RX ADMIN — OXYCODONE HYDROCHLORIDE 5 MG: 5 TABLET ORAL at 02:50

## 2018-09-30 RX ADMIN — Medication 10 MG: at 22:31

## 2018-09-30 RX ADMIN — Medication 10 MG: at 10:12

## 2018-09-30 RX ADMIN — OMEPRAZOLE 40 MG: 20 CAPSULE, DELAYED RELEASE ORAL at 10:12

## 2018-09-30 RX ADMIN — SODIUM CHLORIDE 8 MG/HR: 9 INJECTION, SOLUTION INTRAVENOUS at 05:26

## 2018-09-30 RX ADMIN — ATORVASTATIN CALCIUM 10 MG: 10 TABLET, FILM COATED ORAL at 22:31

## 2018-09-30 RX ADMIN — OXYCODONE HYDROCHLORIDE 5 MG: 5 TABLET ORAL at 08:28

## 2018-09-30 RX ADMIN — MULTIPLE VITAMINS W/ MINERALS TAB 1 TABLET: TAB at 08:28

## 2018-09-30 RX ADMIN — OXYCODONE HYDROCHLORIDE 5 MG: 5 TABLET ORAL at 23:46

## 2018-09-30 RX ADMIN — Medication 10 MG: at 17:25

## 2018-09-30 RX ADMIN — FLUTICASONE FUROATE AND VILANTEROL TRIFENATATE 1 PUFF: 200; 25 POWDER RESPIRATORY (INHALATION) at 08:28

## 2018-09-30 RX ADMIN — OMEPRAZOLE 40 MG: 20 CAPSULE, DELAYED RELEASE ORAL at 17:07

## 2018-09-30 RX ADMIN — Medication 1 MG: at 08:28

## 2018-09-30 RX ADMIN — SODIUM CHLORIDE: 9 INJECTION, SOLUTION INTRAVENOUS at 14:29

## 2018-09-30 RX ADMIN — TAMSULOSIN HYDROCHLORIDE 0.4 MG: 0.4 CAPSULE ORAL at 08:28

## 2018-09-30 RX ADMIN — OXYCODONE HYDROCHLORIDE 5 MG: 5 TABLET ORAL at 20:42

## 2018-09-30 RX ADMIN — LIDOCAINE HYDROCHLORIDE: 20 JELLY TOPICAL at 19:40

## 2018-09-30 RX ADMIN — DULOXETINE HYDROCHLORIDE 60 MG: 60 CAPSULE, DELAYED RELEASE ORAL at 08:28

## 2018-09-30 RX ADMIN — NICOTINE 1 PATCH: 21 PATCH, EXTENDED RELEASE TRANSDERMAL at 08:27

## 2018-09-30 RX ADMIN — MIRTAZAPINE 30 MG: 15 TABLET, FILM COATED ORAL at 22:31

## 2018-09-30 RX ADMIN — HYDROCHLOROTHIAZIDE 25 MG: 25 TABLET ORAL at 08:28

## 2018-09-30 RX ADMIN — SPIRONOLACTONE 25 MG: 25 TABLET, FILM COATED ORAL at 08:28

## 2018-09-30 ASSESSMENT — ACTIVITIES OF DAILY LIVING (ADL)
ADLS_ACUITY_SCORE: 9

## 2018-09-30 NOTE — PLAN OF CARE
Problem: Patient Care Overview  Goal: Plan of Care/Patient Progress Review  Outcome: No Change  Pt A&Ox4. VSS on 2 liter per NC. CIWA highest score was 8 for anxiety/tremors, gave PO Valium per protocol. Last Hgb level 8.3. No active bleeding noted. On serial Hgb check q 6hrs. Lactic acid 3.5, trended down from 4.0. On clear liquid diet. Receiving continuous PPI gtt at 8mg/hr. PIV infusing NS @75. Right rib cage pain managed with PRN oxycodone and Atarax. Abdomen distended/soft.  Urinary retention, PVR bladder scanned 500s x3. Straight cath with 530ml out. Also voided 200ml this AM. Very weak/shaky. Needs strong assist x2 and GB to BSC. GI following. Discharge pending progress.

## 2018-09-30 NOTE — PLAN OF CARE
Problem: Patient Care Overview  Goal: Plan of Care/Patient Progress Review  Outcome: Improving  A&Ox4.  VSS ex 2L O2.  Right side pain d/t rib fx, PRN oxy utilized.  CIWA 5 and 7 this shift.  EGD done today.  On clears diet.  Up with assist of 1.  Has not voided yet, declines bladder scan, does not want to be straight cath, wants to continue to try on his own to void, does not feel the urge to go.  Attempted twice with small UO.  Lactic fired at 4.3, hospitalist notified, bolus given.  Will continue to monitor.

## 2018-09-30 NOTE — PROGRESS NOTES
SPIRITUAL HEALTH SERVICES  Spiritual Assessment Progress Note  [facility]   had a brief interaction with pt.  He named that he was experiencing pain form his rib fx.  Was not interested in talking at this time.    Pt is aware that SH is available should need arise.     No plans to follow.                                                                                                                                            Evelyne Clark M.Div., [facility]  Staff    Pager [phone]

## 2018-09-30 NOTE — PROGRESS NOTES
[facility]    Hospitalist Progress Note    Assessment & Plan   Jim Richard is a 63 year old male who was admitted on 9/29/2018 with ongoing alcohol abuse and vomited blood:    Impression:   Principal Problem:    Hematemesis -- clinically appears better   -- EGD 9/29/18 with grade I varices and no active bleeding  Active Problems:    GI bleed    Alcohol abuse -- now in Alcohol withdrawal (as expected)   -- withdrawal protocol     Rib Fractures (right 7th, 8th, 9th) -- occured 9/9/2018    Alcoholic hepatitis without ascites    Acute blood loss anemia    Esophageal varices (EGD with 6 varices banded 4/27/18)    Essential hypertension    COPD (chronic obstructive pulmonary disease) (H)    Smoker    JANIS on CPAP    Chronic low back pain      Plan:  Continue alcohol withdrawal protocol, cbc in AM.     DVT Prophylaxis: Pneumatic Compression Devices  Code Status: DNR/DNI    Disposition: Expected discharge in 1-2 days once withdrawal sx's better. .    Gray Vincent MD  Pager [phone]  Cell Phone [phone]  Text Page (7am to 6pm)    Interval History   Feels quite shaky, has difficulty holding a glass of water, no further vomiting and no melena yet.     Physical Exam   Temp: 98.1  F (36.7  C) Temp src: Oral BP: 145/68   Heart Rate: 77 Resp: 16 SpO2: 96 % O2 Device: Nasal cannula Oxygen Delivery: 2 LPM  There were no vitals filed for this visit.  Vital Signs with Ranges  Temp:  [98.1  F (36.7  C)-99.6  F (37.6  C)] 98.1  F (36.7  C)  Heart Rate:  [68-94] 77  Resp:  [16-18] 16  BP: (115-181)/(64-94) 145/68  SpO2:  [91 %-97 %] 96 %  I/O last 3 completed shifts:  In: 2308 [P.O.:840; I.V.:1468]  Out: 1500 [Urine:1500]    # Pain Assessment:  Current Pain Score 9/30/2018   Patient currently in pain? yes   Pain score (0-10) 5   Pain location Rib cage   Pain descriptors Headache;Throbbing   Jim gutierrez pain level was assessed and he currently denies pain.        Constitutional: Awake, alert,  cooperative, no apparent distress  Respiratory: Clear to auscultation bilaterally, no crackles or wheezing  Cardiovascular: Regular rate and rhythm, normal S1 and S2, and no murmur noted  GI: Normal bowel sounds, soft, non-distended, non-tender  Extrem: No calf tenderness, 1+ bilateral ankle edema  Neuro: Ox3, no focal motor or sensory deficits but anxious and tremulous    Medications     sodium chloride 75 mL/hr at 09/30/18 1429       atorvastatin (LIPITOR) tablet 10 mg  10 mg Oral At Bedtime     diazepam  10 mg Oral TID     DULoxetine  60 mg Oral Daily     fluticasone-vilanterol  1 puff Inhalation Daily     folic acid  1 mg Oral Daily     spironolactone  25 mg Oral Daily    And     hydrochlorothiazide  25 mg Oral Daily     influenza vaccine adult (product based on age)  0.5 mL Intramuscular Prior to discharge     mirtazapine (REMERON) tablet 30 mg  30 mg Oral At Bedtime     multivitamin, therapeutic with minerals  1 tablet Oral Daily     nicotine  1 patch Transdermal Daily     nicotine   Transdermal Daily     omeprazole  40 mg Oral BID AC     tamsulosin (FLOMAX) capsule 0.4 mg  0.4 mg Oral Daily     thiamine  100 mg Oral Daily       Data     Recent Labs  Lab 09/30/18  0050 09/29/18  1909 09/29/18  1256 09/29/18  0550   WBC  --   --   --  6.1   HGB 8.3* 7.5* 9.5* 10.1*   MCV  --   --   --  74*   PLT  --   --   --  207   INR  --   --   --  1.10   NA  --   --   --  142   POTASSIUM  --   --   --  3.4   CHLORIDE  --   --   --  106   CO2  --   --   --  23   BUN  --   --   --  16   CR  --   --   --  0.79   ANIONGAP  --   --   --  13   KEV  --   --   --  8.1*   GLC  --   --   --  71   ALBUMIN  --   --   --  3.2*   PROTTOTAL  --   --   --  7.2   BILITOTAL  --   --   --  1.0   ALKPHOS  --   --   --  332*   ALT  --   --   --  60   AST  --   --   --  140*   LIPASE  --   --   --  585*       Imaging:   No results found for this or any previous visit (from the past 24 hour(s)).

## 2018-10-01 LAB
ANION GAP SERPL CALCULATED.3IONS-SCNC: 6 MMOL/L (ref 3–14)
BUN SERPL-MCNC: 10 MG/DL (ref 7–30)
CALCIUM SERPL-MCNC: 8.2 MG/DL (ref 8.5–10.1)
CHLORIDE SERPL-SCNC: 104 MMOL/L (ref 94–109)
CO2 SERPL-SCNC: 28 MMOL/L (ref 20–32)
CREAT SERPL-MCNC: 0.8 MG/DL (ref 0.66–1.25)
ERYTHROCYTE [DISTWIDTH] IN BLOOD BY AUTOMATED COUNT: 21.9 % (ref 10–15)
GFR SERPL CREATININE-BSD FRML MDRD: >90 ML/MIN/1.7M2
GLUCOSE SERPL-MCNC: 111 MG/DL (ref 70–99)
HCT VFR BLD AUTO: 27 % (ref 40–53)
HGB BLD-MCNC: 8.4 G/DL (ref 13.3–17.7)
MAGNESIUM SERPL-MCNC: 1.3 MG/DL (ref 1.6–2.3)
MCH RBC QN AUTO: 23.6 PG (ref 26.5–33)
MCHC RBC AUTO-ENTMCNC: 31.1 G/DL (ref 31.5–36.5)
MCV RBC AUTO: 76 FL (ref 78–100)
PLATELET # BLD AUTO: 105 10E9/L (ref 150–450)
POTASSIUM SERPL-SCNC: 3.3 MMOL/L (ref 3.4–5.3)
RBC # BLD AUTO: 3.56 10E12/L (ref 4.4–5.9)
SODIUM SERPL-SCNC: 138 MMOL/L (ref 133–144)
WBC # BLD AUTO: 4.7 10E9/L (ref 4–11)

## 2018-10-01 PROCEDURE — 99207 ZZC CDG-MDM COMPONENT: MEETS MODERATE - UP CODED: CPT | Performed by: INTERNAL MEDICINE

## 2018-10-01 PROCEDURE — 36415 COLL VENOUS BLD VENIPUNCTURE: CPT | Performed by: INTERNAL MEDICINE

## 2018-10-01 PROCEDURE — 83735 ASSAY OF MAGNESIUM: CPT | Performed by: INTERNAL MEDICINE

## 2018-10-01 PROCEDURE — 25000132 ZZH RX MED GY IP 250 OP 250 PS 637: Mod: GY | Performed by: INTERNAL MEDICINE

## 2018-10-01 PROCEDURE — 12000000 ZZH R&B MED SURG/OB

## 2018-10-01 PROCEDURE — 80048 BASIC METABOLIC PNL TOTAL CA: CPT | Performed by: INTERNAL MEDICINE

## 2018-10-01 PROCEDURE — 99233 SBSQ HOSP IP/OBS HIGH 50: CPT | Performed by: INTERNAL MEDICINE

## 2018-10-01 PROCEDURE — A9270 NON-COVERED ITEM OR SERVICE: HCPCS | Mod: GY | Performed by: INTERNAL MEDICINE

## 2018-10-01 PROCEDURE — 85027 COMPLETE CBC AUTOMATED: CPT | Performed by: INTERNAL MEDICINE

## 2018-10-01 PROCEDURE — 25000128 H RX IP 250 OP 636: Performed by: INTERNAL MEDICINE

## 2018-10-01 RX ORDER — OXYCODONE HYDROCHLORIDE 5 MG/1
5 TABLET ORAL 4 TIMES DAILY PRN
Status: DISCONTINUED | OUTPATIENT
Start: 2018-10-01 | End: 2018-10-05 | Stop reason: HOSPADM

## 2018-10-01 RX ORDER — TAMSULOSIN HYDROCHLORIDE 0.4 MG/1
0.8 CAPSULE ORAL DAILY
Status: DISCONTINUED | OUTPATIENT
Start: 2018-10-02 | End: 2018-10-05 | Stop reason: HOSPADM

## 2018-10-01 RX ORDER — DIAZEPAM 5 MG
10 TABLET ORAL 2 TIMES DAILY WITH MEALS
Status: DISCONTINUED | OUTPATIENT
Start: 2018-10-01 | End: 2018-10-01

## 2018-10-01 RX ORDER — POTASSIUM CHLORIDE 1500 MG/1
40 TABLET, EXTENDED RELEASE ORAL EVERY 4 HOURS
Status: COMPLETED | OUTPATIENT
Start: 2018-10-01 | End: 2018-10-01

## 2018-10-01 RX ORDER — FUROSEMIDE 10 MG/ML
20 INJECTION INTRAMUSCULAR; INTRAVENOUS ONCE
Status: COMPLETED | OUTPATIENT
Start: 2018-10-01 | End: 2018-10-01

## 2018-10-01 RX ADMIN — DULOXETINE HYDROCHLORIDE 60 MG: 60 CAPSULE, DELAYED RELEASE ORAL at 09:03

## 2018-10-01 RX ADMIN — OMEPRAZOLE 40 MG: 20 CAPSULE, DELAYED RELEASE ORAL at 09:03

## 2018-10-01 RX ADMIN — POTASSIUM CHLORIDE 40 MEQ: 1500 TABLET, EXTENDED RELEASE ORAL at 11:26

## 2018-10-01 RX ADMIN — FOLIC ACID 1 MG: 1 TABLET ORAL at 09:03

## 2018-10-01 RX ADMIN — ACETAMINOPHEN 650 MG: 325 TABLET, FILM COATED ORAL at 23:40

## 2018-10-01 RX ADMIN — SODIUM CHLORIDE: 9 INJECTION, SOLUTION INTRAVENOUS at 02:38

## 2018-10-01 RX ADMIN — HYDROCHLOROTHIAZIDE 25 MG: 25 TABLET ORAL at 09:03

## 2018-10-01 RX ADMIN — MULTIPLE VITAMINS W/ MINERALS TAB 1 TABLET: TAB at 09:03

## 2018-10-01 RX ADMIN — ATORVASTATIN CALCIUM 10 MG: 10 TABLET, FILM COATED ORAL at 21:29

## 2018-10-01 RX ADMIN — OMEPRAZOLE 40 MG: 20 CAPSULE, DELAYED RELEASE ORAL at 16:26

## 2018-10-01 RX ADMIN — SPIRONOLACTONE 25 MG: 25 TABLET, FILM COATED ORAL at 09:03

## 2018-10-01 RX ADMIN — Medication 10 MG: at 15:28

## 2018-10-01 RX ADMIN — MIRTAZAPINE 30 MG: 15 TABLET, FILM COATED ORAL at 21:29

## 2018-10-01 RX ADMIN — FUROSEMIDE 20 MG: 10 INJECTION, SOLUTION INTRAVENOUS at 12:57

## 2018-10-01 RX ADMIN — POTASSIUM CHLORIDE 40 MEQ: 1500 TABLET, EXTENDED RELEASE ORAL at 15:28

## 2018-10-01 RX ADMIN — Medication 10 MG: at 09:03

## 2018-10-01 RX ADMIN — NICOTINE 1 PATCH: 21 PATCH, EXTENDED RELEASE TRANSDERMAL at 09:04

## 2018-10-01 RX ADMIN — Medication 100 MG: at 09:03

## 2018-10-01 RX ADMIN — TAMSULOSIN HYDROCHLORIDE 0.4 MG: 0.4 CAPSULE ORAL at 09:03

## 2018-10-01 RX ADMIN — OXYCODONE HYDROCHLORIDE 5 MG: 5 TABLET ORAL at 19:51

## 2018-10-01 RX ADMIN — Medication 10 MG: at 00:24

## 2018-10-01 RX ADMIN — FLUTICASONE FUROATE AND VILANTEROL TRIFENATATE 1 PUFF: 200; 25 POWDER RESPIRATORY (INHALATION) at 09:46

## 2018-10-01 ASSESSMENT — ACTIVITIES OF DAILY LIVING (ADL)
ADLS_ACUITY_SCORE: 10

## 2018-10-01 ASSESSMENT — PAIN DESCRIPTION - DESCRIPTORS
DESCRIPTORS: ACHING
DESCRIPTORS: ACHING;CRAMPING
DESCRIPTORS: ACHING

## 2018-10-01 NOTE — PROGRESS NOTES
[facility]    Hospitalist Progress Note    Assessment & Plan   Jim Richard is a 63 year old male who was admitted on 9/29/2018 with ongoing alcohol abuse and vomited blood:    Impression:   Principal Problem:    Hematemesis, UGI bleed -- clinically appears better   -- EGD 9/29/18 with grade I varices and no active bleeding   -- hgb stable  Active Problems:    Alcohol abuse -- now in Alcohol withdrawal (as expected)   -- withdrawal protocol, and will cancel scheduled valium    Rib Fractures (right 7th, 8th, 9th) -- occured 9/9/2018    Alcoholic hepatitis without ascites    Acute blood loss anemia    Esophageal varices (EGD with 6 varices banded 4/27/18)    Essential hypertension    COPD (chronic obstructive pulmonary disease) (H)    Smoker    JANIS on CPAP    Chronic low back pain   -- will decrease Oxycodone to 5 mg qid prn    Urinary retention   -- jiang placed   -- will try voiding trial tomorrow when more alert   -- increase Flomax to 0.8 mg daily      Plan:  Continue alcohol withdrawal protocol, cbc in AM. Will stop IV fluids and give 20 mg Lasix IV once to help with edema.  Also PT eval.  Anticipate discharge tomorrow if able to wean of O2 and safely ambulate, and alcohol withdrawal better    DVT Prophylaxis: Pneumatic Compression Devices  Code Status: DNR/DNI    Disposition: Expected discharge in 1 days once withdrawal sx's better. .    Grya Vincent MD  Pager [phone]  Cell Phone [phone]  Text Page (7am to 6pm)    Interval History   Less shaky today, but drowsy this AM, and on O2 and jiang placed 2nd to urinary retention.     Physical Exam   Temp: 99.4  F (37.4  C) Temp src: Oral BP: 140/86   Heart Rate: 110 Resp: 18 SpO2: 96 % O2 Device: Nasal cannula Oxygen Delivery: 2 LPM  There were no vitals filed for this visit.  Vital Signs with Ranges  Temp:  [96.2  F (35.7  C)-99.4  F (37.4  C)] 99.4  F (37.4  C)  Heart Rate:  [] 110  Resp:  [16-18] 18  BP:  (123-173)/(70-86) 140/86  SpO2:  [93 %-98 %] 96 %  I/O last 3 completed shifts:  In: 1877 [P.O.:240; I.V.:1637]  Out: 2935 [Urine:2935]    # Pain Assessment:  Current Pain Score 10/1/2018   Patient currently in pain? denies   Pain score (0-10) -   Pain location -   Pain descriptors -   Jim gutierrez pain level was assessed and he currently denies pain.        Constitutional: Awake, alert, cooperative, no apparent distress  Respiratory: Clear to auscultation bilaterally, no crackles or wheezing  Cardiovascular: Regular rate and rhythm, normal S1 and S2, and no murmur noted  GI: Normal bowel sounds, soft, non-distended, non-tender  Extrem: No calf tenderness, 1+ bilateral ankle edema  Neuro: Ox3, no focal motor or sensory deficits but anxious and tremulous    Medications       atorvastatin (LIPITOR) tablet 10 mg  10 mg Oral At Bedtime     DULoxetine  60 mg Oral Daily     fluticasone-vilanterol  1 puff Inhalation Daily     folic acid  1 mg Oral Daily     spironolactone  25 mg Oral Daily    And     hydrochlorothiazide  25 mg Oral Daily     influenza vaccine adult (product based on age)  0.5 mL Intramuscular Prior to discharge     mirtazapine (REMERON) tablet 30 mg  30 mg Oral At Bedtime     multivitamin, therapeutic with minerals  1 tablet Oral Daily     nicotine  1 patch Transdermal Daily     nicotine   Transdermal Daily     omeprazole  40 mg Oral BID AC     tamsulosin (FLOMAX) capsule 0.4 mg  0.4 mg Oral Daily     thiamine  100 mg Oral Daily       Data     Recent Labs  Lab 10/01/18  0730 09/30/18  0050 09/29/18  1909  09/29/18  0550   WBC 4.7  --   --   --  6.1   HGB 8.4* 8.3* 7.5*  < > 10.1*   MCV 76*  --   --   --  74*   *  --   --   --  207   INR  --   --   --   --  1.10     --   --   --  142   POTASSIUM 3.3*  --   --   --  3.4   CHLORIDE 104  --   --   --  106   CO2 28  --   --   --  23   BUN 10  --   --   --  16   CR 0.80  --   --   --  0.79   ANIONGAP 6  --   --   --  13   KEV 8.2*  --   --   --  8.1*    *  --   --   --  71   ALBUMIN  --   --   --   --  3.2*   PROTTOTAL  --   --   --   --  7.2   BILITOTAL  --   --   --   --  1.0   ALKPHOS  --   --   --   --  332*   ALT  --   --   --   --  60   AST  --   --   --   --  140*   LIPASE  --   --   --   --  585*   < > = values in this interval not displayed.    Imaging:   No results found for this or any previous visit (from the past 24 hour(s)).

## 2018-10-01 NOTE — PROGRESS NOTES
Status Post EGD on 9/29/2018 revealed no evidence of active GI bleed. Noted to have congested gastric mucosa and grad I esophageal varices with non bleeding duodenal ulcer.  Started on Protonix.  Hemoglobin has improved and stable at 8.4.  Will recommend to continue to monitor. If any concerns for other GI concerns please contact [facility]. GI will sign off. No active GI problems    Gloria De Leon PA-C  [facility]

## 2018-10-01 NOTE — PLAN OF CARE
Problem: Patient Care Overview  Goal: Plan of Care/Patient Progress Review  Outcome: No Change  Patient is alert and oriented, agitate, anxious, pulling at jiang with CIWA of 15 this shift- valium given, Jiang placed for retention shift shift, not tolerating well, urgency and pain in penis, oxy given. 2 L O2. On a regular diet tolerating well. Up with 2 d/t weakness and tremors. Discharge pending.

## 2018-10-01 NOTE — PLAN OF CARE
Problem: Patient Care Overview  Goal: Plan of Care/Patient Progress Review  Outcome: Improving  VSS ex. Hypertensive.  Disoriented to time.  2L O2 NC.  NS infusing.  C/o of right flank pain, PRN oxycodone given once.  CIWA 6-7.  Scheduled Valium given only.  Regular diet, tolerating well.  Urinary retention.  Bladder scan and straight cath this shift.  Assist of 1-2 and BSC.  Will continue to monitor.

## 2018-10-01 NOTE — PLAN OF CARE
Problem: Patient Care Overview  Goal: Plan of Care/Patient Progress Review  Outcome: Improving  Lethargic, slept between cares. Occasionally disoriented to time and situation but mentation improved throughout the day. Cooperative, follows commands. Tachy, all other VSS on 2L O2 via NC. Wean as able. CIWA 3, 1, scored 11 for tremors and anxiety this afternoon. Valium given as ordered. CIWA recheck 5. Denies pain. Tolerating regular diet. Leger patent with good urine output. Up with assist x2, unsteady. PT consulted. Plan for discharge in 1-2 days once withdrawal symptoms improve and safe d/c plan in place.

## 2018-10-01 NOTE — PROGRESS NOTES
[facility]  Gastroenterology Progress Note     Jim Richard MRN# [id number]   YOB: 1955 Age: 63 year old          Assessment and Plan:     Hematemesis    GI bleed    Alcohol abuse    Rib Fractures (right 7th, 8th, 9th) -- occured 9/9/2018    Alcoholic hepatitis without ascites    Acute blood loss anemia    Esophageal varices (EGD with 6 varices banded 4/27/18)    Essential hypertension    COPD (chronic obstructive pulmonary disease) (H)    Smoker    JANIS on CPAP    Chronic low back pain    CAD (coronary artery disease)  Clinically doing well stable with no significant change patient is still in withdrawal still shaky.  Hemoglobin is stable patient is communicating well.  We will advance his diet to regular diet.  Continue on Protonix.          Hematemesis with nausea  Closed fracture of multiple ribs of right side, initial encounter  Alcoholic hepatitis without ascites  Secondary esophageal varices with bleeding (H)  Alcoholic intoxication without complication (H)  Fall, initial encounter      Interval History:   No change              Review of Systems:   C: NEGATIVE for fever, chills, change in weight  E/M: NEGATIVE for ear, mouth and throat problems  R: NEGATIVE for significant cough or SOB  CV: NEGATIVE for chest pain, palpitations or peripheral edema             Medications:   I have reviewed this patient's current medications    atorvastatin (LIPITOR) tablet 10 mg  10 mg Oral At Bedtime     diazepam  10 mg Oral TID     DULoxetine  60 mg Oral Daily     fluticasone-vilanterol  1 puff Inhalation Daily     folic acid  1 mg Oral Daily     spironolactone  25 mg Oral Daily    And     hydrochlorothiazide  25 mg Oral Daily     influenza vaccine adult (product based on age)  0.5 mL Intramuscular Prior to discharge     mirtazapine (REMERON) tablet 30 mg  30 mg Oral At Bedtime     multivitamin, therapeutic with minerals  1 tablet Oral Daily     nicotine  1 patch Transdermal Daily      nicotine   Transdermal Daily     omeprazole  40 mg Oral BID AC     tamsulosin (FLOMAX) capsule 0.4 mg  0.4 mg Oral Daily     thiamine  100 mg Oral Daily                  Physical Exam:   Vitals were reviewed  Vital Signs with Ranges  Temp:  [98.1  F (36.7  C)-99.6  F (37.6  C)] 98.9  F (37.2  C)  Heart Rate:  [68-94] 72  Resp:  [16-18] 16  BP: (123-181)/(68-94) 123/70  SpO2:  [93 %-98 %] 98 %  I/O last 3 completed shifts:  In: 2308 [P.O.:840; I.V.:1468]  Out: 1500 [Urine:1500]             Data:   I reviewed the patient's new clinical lab test results.   Recent Labs   Lab Test  09/30/18   0050  09/29/18   1909  09/29/18   1256  09/29/18   0550  05/21/18 2247 05/02/18   0635  04/30/18   0612   WBC   --    --    --   6.1  6.1  5.0  5.3   HGB  8.3*  7.5*  9.5*  10.1*  8.2*  7.4*  8.1*   MCV   --    --    --   74*  78  84  85   PLT   --    --    --   207  224  131*  134*   INR   --    --    --   1.10  1.08   --   1.13     Recent Labs   Lab Test  09/29/18   0550  05/21/18 2247 05/02/18   0635   POTASSIUM  3.4  3.6  3.5   CHLORIDE  106  107  105   CO2  23  27  24   BUN  16  15  12   ANIONGAP  13  7  8     Recent Labs   Lab Test  09/29/18   0550  05/21/18   2247 05/02/18   0635   03/21/18   1555   09/25/14   0510   06/12/12   0550   ALBUMIN  3.2*  3.2*  2.5*   < >  2.9*   < >   --    < >   --    BILITOTAL  1.0  0.3  0.7   < >  0.9   < >   --    < >   --    ALT  60  23  40   < >  74*   < >   --    < >   --    AST  140*  42  53*   < >  136*   < >   --    < >   --    PROTEIN   --    --    --    --    --    --   Negative   --   Negative   LIPASE  585*   --    --    --   316   --    --    --    --     < > = values in this interval not displayed.       I reviewed the patient's new imaging results.    All laboratory data reviewed  All imaging studies reviewed by me.    Rosendo Shaw MD,  9/30/2018  [facility]  Office : [phone]  Cell: [phone]

## 2018-10-01 NOTE — PLAN OF CARE
Problem: Pain, Acute (Adult)  Goal: Identify Related Risk Factors and Signs and Symptoms  Related risk factors and signs and symptoms are identified upon initiation of Human Response Clinical Practice Guideline (CPG).   Outcome: No Change  Patient is confused and disoriented to time and situation. VSS ex hypertensive at times. C/o penial pain, gave oxy x1. CIWAs q2-4 hours, given valium x1. On 2L oxygen via NC. Tolerating regular diet. Leger in place d/t retention with adequate output. Up with assist of 2, unsteady gait.

## 2018-10-02 ENCOUNTER — APPOINTMENT (OUTPATIENT)
Dept: PHYSICAL THERAPY | Facility: CLINIC | Age: 63
DRG: 377 | End: 2018-10-02
Attending: INTERNAL MEDICINE
Payer: MEDICARE

## 2018-10-02 LAB
ERYTHROCYTE [DISTWIDTH] IN BLOOD BY AUTOMATED COUNT: 22.2 % (ref 10–15)
HCT VFR BLD AUTO: 26.9 % (ref 40–53)
HGB BLD-MCNC: 8.4 G/DL (ref 13.3–17.7)
MAGNESIUM SERPL-MCNC: 1.4 MG/DL (ref 1.6–2.3)
MAGNESIUM SERPL-MCNC: 2.1 MG/DL (ref 1.6–2.3)
MCH RBC QN AUTO: 23.5 PG (ref 26.5–33)
MCHC RBC AUTO-ENTMCNC: 31.2 G/DL (ref 31.5–36.5)
MCV RBC AUTO: 75 FL (ref 78–100)
PHOSPHATE SERPL-MCNC: 2.2 MG/DL (ref 2.5–4.5)
PLATELET # BLD AUTO: 104 10E9/L (ref 150–450)
POTASSIUM SERPL-SCNC: 3.3 MMOL/L (ref 3.4–5.3)
POTASSIUM SERPL-SCNC: 4.3 MMOL/L (ref 3.4–5.3)
RBC # BLD AUTO: 3.57 10E12/L (ref 4.4–5.9)
WBC # BLD AUTO: 4.4 10E9/L (ref 4–11)

## 2018-10-02 PROCEDURE — 97161 PT EVAL LOW COMPLEX 20 MIN: CPT | Mod: GP | Performed by: PHYSICAL THERAPIST

## 2018-10-02 PROCEDURE — 12000000 ZZH R&B MED SURG/OB

## 2018-10-02 PROCEDURE — 36415 COLL VENOUS BLD VENIPUNCTURE: CPT | Performed by: INTERNAL MEDICINE

## 2018-10-02 PROCEDURE — 25000132 ZZH RX MED GY IP 250 OP 250 PS 637: Mod: GY | Performed by: INTERNAL MEDICINE

## 2018-10-02 PROCEDURE — 40000193 ZZH STATISTIC PT WARD VISIT: Performed by: PHYSICAL THERAPIST

## 2018-10-02 PROCEDURE — 83735 ASSAY OF MAGNESIUM: CPT | Performed by: HOSPITALIST

## 2018-10-02 PROCEDURE — 97530 THERAPEUTIC ACTIVITIES: CPT | Mod: GP | Performed by: PHYSICAL THERAPIST

## 2018-10-02 PROCEDURE — 25000125 ZZHC RX 250: Performed by: HOSPITALIST

## 2018-10-02 PROCEDURE — 84132 ASSAY OF SERUM POTASSIUM: CPT | Performed by: INTERNAL MEDICINE

## 2018-10-02 PROCEDURE — 83735 ASSAY OF MAGNESIUM: CPT | Performed by: INTERNAL MEDICINE

## 2018-10-02 PROCEDURE — 84132 ASSAY OF SERUM POTASSIUM: CPT | Performed by: HOSPITALIST

## 2018-10-02 PROCEDURE — 84100 ASSAY OF PHOSPHORUS: CPT | Performed by: INTERNAL MEDICINE

## 2018-10-02 PROCEDURE — 25000132 ZZH RX MED GY IP 250 OP 250 PS 637: Mod: GY | Performed by: HOSPITALIST

## 2018-10-02 PROCEDURE — A9270 NON-COVERED ITEM OR SERVICE: HCPCS | Mod: GY | Performed by: INTERNAL MEDICINE

## 2018-10-02 PROCEDURE — A9270 NON-COVERED ITEM OR SERVICE: HCPCS | Mod: GY | Performed by: HOSPITALIST

## 2018-10-02 PROCEDURE — 36415 COLL VENOUS BLD VENIPUNCTURE: CPT | Performed by: HOSPITALIST

## 2018-10-02 PROCEDURE — 85027 COMPLETE CBC AUTOMATED: CPT | Performed by: INTERNAL MEDICINE

## 2018-10-02 PROCEDURE — 25000128 H RX IP 250 OP 636: Performed by: HOSPITALIST

## 2018-10-02 PROCEDURE — 99232 SBSQ HOSP IP/OBS MODERATE 35: CPT | Performed by: HOSPITALIST

## 2018-10-02 RX ORDER — MAGNESIUM SULFATE HEPTAHYDRATE 40 MG/ML
4 INJECTION, SOLUTION INTRAVENOUS EVERY 4 HOURS PRN
Status: DISCONTINUED | OUTPATIENT
Start: 2018-10-02 | End: 2018-10-05 | Stop reason: HOSPADM

## 2018-10-02 RX ORDER — POTASSIUM CHLORIDE 29.8 MG/ML
20 INJECTION INTRAVENOUS
Status: DISCONTINUED | OUTPATIENT
Start: 2018-10-02 | End: 2018-10-05 | Stop reason: HOSPADM

## 2018-10-02 RX ORDER — POTASSIUM CHLORIDE 7.45 MG/ML
10 INJECTION INTRAVENOUS
Status: DISCONTINUED | OUTPATIENT
Start: 2018-10-02 | End: 2018-10-05 | Stop reason: HOSPADM

## 2018-10-02 RX ORDER — POTASSIUM CL/LIDO/0.9 % NACL 10MEQ/0.1L
10 INTRAVENOUS SOLUTION, PIGGYBACK (ML) INTRAVENOUS
Status: DISCONTINUED | OUTPATIENT
Start: 2018-10-02 | End: 2018-10-05 | Stop reason: HOSPADM

## 2018-10-02 RX ORDER — POTASSIUM CHLORIDE 1.5 G/1.58G
20-40 POWDER, FOR SOLUTION ORAL
Status: DISCONTINUED | OUTPATIENT
Start: 2018-10-02 | End: 2018-10-05 | Stop reason: HOSPADM

## 2018-10-02 RX ORDER — POTASSIUM CHLORIDE 1500 MG/1
20-40 TABLET, EXTENDED RELEASE ORAL
Status: DISCONTINUED | OUTPATIENT
Start: 2018-10-02 | End: 2018-10-05 | Stop reason: HOSPADM

## 2018-10-02 RX ADMIN — FLUTICASONE FUROATE AND VILANTEROL TRIFENATATE 1 PUFF: 200; 25 POWDER RESPIRATORY (INHALATION) at 08:19

## 2018-10-02 RX ADMIN — HYDROCHLOROTHIAZIDE 25 MG: 25 TABLET ORAL at 08:16

## 2018-10-02 RX ADMIN — OXYCODONE HYDROCHLORIDE 5 MG: 5 TABLET ORAL at 11:07

## 2018-10-02 RX ADMIN — TAMSULOSIN HYDROCHLORIDE 0.8 MG: 0.4 CAPSULE ORAL at 08:16

## 2018-10-02 RX ADMIN — POTASSIUM CHLORIDE 20 MEQ: 1500 TABLET, EXTENDED RELEASE ORAL at 13:45

## 2018-10-02 RX ADMIN — OMEPRAZOLE 40 MG: 20 CAPSULE, DELAYED RELEASE ORAL at 16:14

## 2018-10-02 RX ADMIN — MULTIPLE VITAMINS W/ MINERALS TAB 1 TABLET: TAB at 08:16

## 2018-10-02 RX ADMIN — OMEPRAZOLE 40 MG: 20 CAPSULE, DELAYED RELEASE ORAL at 06:43

## 2018-10-02 RX ADMIN — Medication 100 MG: at 08:16

## 2018-10-02 RX ADMIN — OXYCODONE HYDROCHLORIDE 5 MG: 5 TABLET ORAL at 02:18

## 2018-10-02 RX ADMIN — MIRTAZAPINE 30 MG: 15 TABLET, FILM COATED ORAL at 22:01

## 2018-10-02 RX ADMIN — SPIRONOLACTONE 25 MG: 25 TABLET, FILM COATED ORAL at 08:16

## 2018-10-02 RX ADMIN — POTASSIUM CHLORIDE 40 MEQ: 1500 TABLET, EXTENDED RELEASE ORAL at 10:58

## 2018-10-02 RX ADMIN — ATORVASTATIN CALCIUM 10 MG: 10 TABLET, FILM COATED ORAL at 22:01

## 2018-10-02 RX ADMIN — OXYCODONE HYDROCHLORIDE 5 MG: 5 TABLET ORAL at 17:20

## 2018-10-02 RX ADMIN — TRAZODONE HYDROCHLORIDE 200 MG: 100 TABLET ORAL at 22:07

## 2018-10-02 RX ADMIN — DULOXETINE HYDROCHLORIDE 60 MG: 60 CAPSULE, DELAYED RELEASE ORAL at 08:16

## 2018-10-02 RX ADMIN — FOLIC ACID 1 MG: 1 TABLET ORAL at 08:16

## 2018-10-02 RX ADMIN — POTASSIUM PHOSPHATE, MONOBASIC AND POTASSIUM PHOSPHATE, DIBASIC 15 MMOL: 224; 236 INJECTION, SOLUTION INTRAVENOUS at 13:47

## 2018-10-02 RX ADMIN — MAGNESIUM SULFATE IN WATER 4 G: 40 INJECTION, SOLUTION INTRAVENOUS at 10:57

## 2018-10-02 RX ADMIN — NICOTINE 1 PATCH: 21 PATCH, EXTENDED RELEASE TRANSDERMAL at 08:17

## 2018-10-02 RX ADMIN — Medication 10 MG: at 08:16

## 2018-10-02 ASSESSMENT — ACTIVITIES OF DAILY LIVING (ADL)
ADLS_ACUITY_SCORE: 10
ADLS_ACUITY_SCORE: 11
ADLS_ACUITY_SCORE: 10
ADLS_ACUITY_SCORE: 11
ADLS_ACUITY_SCORE: 11
ADLS_ACUITY_SCORE: 10

## 2018-10-02 ASSESSMENT — PAIN DESCRIPTION - DESCRIPTORS
DESCRIPTORS: ACHING

## 2018-10-02 NOTE — PROGRESS NOTES
[facility]    Hospitalist Progress Note      Assessment & Plan   Jim Richard is a 63 year old male who was admitted on 9/29/2018 with ongoing alcohol abuse and vomited blood.    Hematemesis, upper gastrointestinal hemorrhage   Acute blood loss anemia  Esophageal varices (EGD with 6 varices banded 4/27/18)  Stable/resolved  EGD 9/29/18 with grade I varices and no active bleeding  Hemoglobin  stable    Alcohol abuse now in Alcohol withdrawal   Continue diazepam per CIWA scoring    Hypokalemia and hypomagnesemia   Replace per protocol    Rib Fractures (right 7th, 8th, 9th) -- occured 9/9/2018  Stable     Alcoholic hepatitis without ascites  Repeat LFT's in morning     Essential hypertension  Stable     COPD (chronic obstructive pulmonary disease)  Smoker  Stable     JANIS on CPAP  Stable     Chronic low back pain  Oxycodone to 5 mg qid prn    Urinary retention  Leger placed  Continue Flomax to 0.8 mg daily  Voiding trial soon      DVT Prophylaxis: Pneumatic Compression Devices  Code Status: DNR/DNI    Disposition: Expected discharge  TBD    Mauricio Ramírez MD  Text Page (7am - 6pm)    Interval History       -Data reviewed today: I reviewed all new labs and imaging results over the last 24 hours. I personally reviewed no images or EKG's today.    Physical Exam   Temp: 98.1  F (36.7  C) Temp src: Oral BP: 120/68   Heart Rate: 95 Resp: 16 SpO2: 91 % O2 Device: None (Room air) Oxygen Delivery: 1 LPM  There were no vitals filed for this visit.  Vital Signs with Ranges  Temp:  [97.9  F (36.6  C)-99.4  F (37.4  C)] 98.1  F (36.7  C)  Heart Rate:  [] 95  Resp:  [16-18] 16  BP: (119-149)/(59-86) 120/68  SpO2:  [89 %-98 %] 91 %  I/O last 3 completed shifts:  In: 480 [P.O.:480]  Out: 2300 [Urine:2300]    Constitutional: Awake, alert, cooperative, no apparent distress but looks weak and acutely ill  Respiratory: Clear to auscultation bilaterally, no crackles or wheezing  Cardiovascular: Regular  rate and rhythm, normal S1 and S2, and no murmur noted  GI: Normal bowel sounds, soft, non-distended, non-tender  Skin/Integumen: No rashes, no cyanosis, no edema  Other: Mild UE tremor    Medications       atorvastatin (LIPITOR) tablet 10 mg  10 mg Oral At Bedtime     DULoxetine  60 mg Oral Daily     fluticasone-vilanterol  1 puff Inhalation Daily     folic acid  1 mg Oral Daily     spironolactone  25 mg Oral Daily    And     hydrochlorothiazide  25 mg Oral Daily     influenza vaccine adult (product based on age)  0.5 mL Intramuscular Prior to discharge     mirtazapine (REMERON) tablet 30 mg  30 mg Oral At Bedtime     multivitamin, therapeutic with minerals  1 tablet Oral Daily     nicotine  1 patch Transdermal Daily     nicotine   Transdermal Daily     omeprazole  40 mg Oral BID AC     tamsulosin (FLOMAX) capsule 0.8 mg  0.8 mg Oral Daily     thiamine  100 mg Oral Daily       Data     Recent Labs  Lab 10/02/18  0745 10/01/18  0730 09/30/18  0050  09/29/18  0550   WBC 4.4 4.7  --   --  6.1   HGB 8.4* 8.4* 8.3*  < > 10.1*   MCV 75* 76*  --   --  74*   * 105*  --   --  207   INR  --   --   --   --  1.10   NA  --  138  --   --  142   POTASSIUM 3.3* 3.3*  --   --  3.4   CHLORIDE  --  104  --   --  106   CO2  --  28  --   --  23   BUN  --  10  --   --  16   CR  --  0.80  --   --  0.79   ANIONGAP  --  6  --   --  13   KEV  --  8.2*  --   --  8.1*   GLC  --  111*  --   --  71   ALBUMIN  --   --   --   --  3.2*   PROTTOTAL  --   --   --   --  7.2   BILITOTAL  --   --   --   --  1.0   ALKPHOS  --   --   --   --  332*   ALT  --   --   --   --  60   AST  --   --   --   --  140*   LIPASE  --   --   --   --  585*   < > = values in this interval not displayed.    No results found for this or any previous visit (from the past 24 hour(s)).

## 2018-10-02 NOTE — PLAN OF CARE
Problem: Patient Care Overview  Goal: Plan of Care/Patient Progress Review  Discharge Planner PT   Patient plan for discharge: None stated.   Current status: Orders received, evaluation completed, and treatment initiated. Patient is a 62 y/o male admitted with hematemesis. Patient lives in a Townhouse with a roommate, 6 stairs to enter/exit and 6 stairs to access bedroom. Patient reports completing functional mobility independently without use of an AD at baseline. Patient in supine upon arrival of therapist with report of R sided rib pain of 7/10. Patient performed supine-sit with Sarah. Sit <> stand and ambulation of a couple steps to bedside chair with FWW and Sarah. Noted B UE tremors with OOB mobility. Pt sitting up in chair at end of session.   Barriers to return to prior living situation: Lack of assist upon return home, Stairs, Current level of assist, unable to tolerate functional ambulation distances  Recommendations for discharge: TCU  Rationale for recommendations: Pt is below baseline in regards to functional mobility and will require TCU in order to increase independence and safety with mobility.        Entered by: Michaela Zarate 10/02/2018 12:00 PM

## 2018-10-02 NOTE — PROGRESS NOTES
"   10/02/18 1107   Quick Adds   Type of Visit Initial PT Evaluation   Living Environment   Lives With other (see comments)  (Rommate)   Living Arrangements house  ([facility])   Number of Stairs to Enter Home 6  (1 railing.)   Number of Stairs Within Home 6  (1 railing. )   Transportation Available car  (Pt drives at baseline.)   Living Environment Comment Pt reports his roommate does the cleaning.    Self-Care   Usual Activity Tolerance good   Current Activity Tolerance fair   Regular Exercise no   Equipment Currently Used at Home none   Functional Level Prior   Ambulation 0-->independent   Transferring 0-->independent   Fall history within last six months yes   Number of times patient has fallen within last six months 3   General Information   Onset of Illness/Injury or Date of Surgery - Date 09/29/18   Referring Physician Gray Burns MD   Patient/Family Goals Statement None stated.    Pertinent History of Current Problem (include personal factors and/or comorbidities that impact the POC) 62 y/o male admitted with hematemesis. PMH including alcoholism and varices, anxiety, CAD, Depression, heart attack, HTN, PVD.    Precautions/Limitations fall precautions   General Observations Pt in supine upon arrival of therapist.    General Info Comments Up with assist.    Cognitive Status Examination   Orientation person  (\"Monday May 1st\")   Level of Consciousness lethargic/somnolent   Follows Commands and Answers Questions 75% of the time   Personal Safety and Judgment impaired   Pain Assessment   Patient Currently in Pain (R rib pain: 7/10)   Integumentary/Edema   Integumentary/Edema Comments No deficits noted.    Posture    Posture Comments Noted forward head and shoulder posture upon standing at FWW.    Range of Motion (ROM)   ROM Comment B LEs WFL.    Strength   Strength Comments B LEs grossly at least 3/5 with ROM sitting EOB, pt had difficulty following cues to formally assess.    Bed Mobility   Bed " "Mobility Comments Supine-sit, Sarah.    Transfer Skills   Transfer Comments Sit <> stand with FWW and Sarah of 2.    Gait   Gait Comments Pt amb a couple steps to bedside chair with FWW and Sarah of 2.    Balance   Balance Comments Noted good sitting balance and fair standing/dynamic balance.    Sensory Examination   Sensory Perception Comments Pt denies numbness/tingling in B LEs and UEs.    General Therapy Interventions   Planned Therapy Interventions bed mobility training;gait training;ROM;strengthening;transfer training   Clinical Impression   Criteria for Skilled Therapeutic Intervention yes, treatment indicated   PT Diagnosis Difficulty with functional mobility.    Influenced by the following impairments Generalized weakness, Decreased activity tolerance, Impaired balance,    Functional limitations due to impairments Limited functional mobility requiring AD and assist of 2.    Clinical Presentation Stable/Uncomplicated   Clinical Presentation Rationale Based on PMH, current presentation, and social support.    Clinical Decision Making (Complexity) Low complexity   Therapy Frequency` daily   Predicted Duration of Therapy Intervention (days/wks) 4 days   Anticipated Discharge Disposition Transitional Care Facility   Risk & Benefits of therapy have been explained Yes   Patient, Family & other staff in agreement with plan of care Yes   [facility] [employer] [facility] TM \"6 Clicks\"   2016, Trustees of [facility], under license to [employer].  All rights reserved.   6 Clicks Short Forms Basic Mobility Inpatient Short Form   [facility] AM-PAC  \"6 Clicks\" V.2 Basic Mobility Inpatient Short Form   1. Turning from your back to your side while in a flat bed without using bedrails? 3 - A Little   2. Moving from lying on your back to sitting on the side of a flat bed without using bedrails? 2 - A Lot   3. Moving to and from a bed to a chair (including a wheelchair)? 2 - A Lot   4. Standing up from a chair using your " arms (e.g., wheelchair, or bedside chair)? 2 - A Lot   5. To walk in hospital room? 1 - Total   6. Climbing 3-5 steps with a railing? 1 - Total   Basic Mobility Raw Score (Score out of 24.Lower scores equate to lower levels of function) 11   Total Evaluation Time   Total Evaluation Time (Minutes) 8

## 2018-10-02 NOTE — PLAN OF CARE
Problem: Patient Care Overview  Goal: Plan of Care/Patient Progress Review  Outcome: No Change  Disoriented to situation. CIWA 5, 4, & 3 related to slight agitation and upper extremity tremors. Pt. cooperative with cares. Pt. weaned down to 1L NC, pt. satting between 90-94%. OVSS. Pt. c/o abdominal pain, oxycodone given x2 for minimal relief. Pt. denies nausea. Leger patent, adequate urine output. Pt. reports constipation, no BM since 9/28. Generalized weakness, up with assist of 2 with gait belt. No acute events. Pt. resting comfortably between cares. Will continue POC and notify MD with changes.

## 2018-10-02 NOTE — PLAN OF CARE
Problem: Patient Care Overview  Goal: Plan of Care/Patient Progress Review  Outcome: No Change  A&Ox4. Tremulous in upper extremities and anxious at times. CIWA scores 9,4,4,3. Valium given x1 as ordered. Patient calm, cooperative. Follows commands. NICOLE. Placed back on 2L O2 via NC, wean as able. PRN oxycodone given x2 for c/o ribcage pain from fractures. Provided some relief. Leger removed at 0800, voided x1 in urinal at bedside. Bladder scanned for 341mL, then voided another 50mL. Straight cath if PVR greater than 400mL. Fluids encouraged. Tolerating regular diet, good appetite/intake. K 3.3. Mag 1.4. Phos 2.2. All replaced. Rechecks: K 4.3. Mag 2.1. No further replacement indicated. Will check again with AM labs. Up with assist x2 + walker, gait-belt. PT recommending TCU.

## 2018-10-03 LAB
ALBUMIN SERPL-MCNC: 2.6 G/DL (ref 3.4–5)
ALP SERPL-CCNC: 297 U/L (ref 40–150)
ALT SERPL W P-5'-P-CCNC: 64 U/L (ref 0–70)
ANION GAP SERPL CALCULATED.3IONS-SCNC: 4 MMOL/L (ref 3–14)
AST SERPL W P-5'-P-CCNC: 97 U/L (ref 0–45)
BILIRUB SERPL-MCNC: 1 MG/DL (ref 0.2–1.3)
BUN SERPL-MCNC: 9 MG/DL (ref 7–30)
CALCIUM SERPL-MCNC: 8.5 MG/DL (ref 8.5–10.1)
CHLORIDE SERPL-SCNC: 105 MMOL/L (ref 94–109)
CO2 SERPL-SCNC: 28 MMOL/L (ref 20–32)
CREAT SERPL-MCNC: 0.85 MG/DL (ref 0.66–1.25)
GFR SERPL CREATININE-BSD FRML MDRD: >90 ML/MIN/1.7M2
GLUCOSE SERPL-MCNC: 92 MG/DL (ref 70–99)
MAGNESIUM SERPL-MCNC: 1.8 MG/DL (ref 1.6–2.3)
PHOSPHATE SERPL-MCNC: 3 MG/DL (ref 2.5–4.5)
POTASSIUM SERPL-SCNC: 3.9 MMOL/L (ref 3.4–5.3)
PROT SERPL-MCNC: 6.1 G/DL (ref 6.8–8.8)
SODIUM SERPL-SCNC: 137 MMOL/L (ref 133–144)

## 2018-10-03 PROCEDURE — A9270 NON-COVERED ITEM OR SERVICE: HCPCS | Mod: GY | Performed by: HOSPITALIST

## 2018-10-03 PROCEDURE — 83735 ASSAY OF MAGNESIUM: CPT | Performed by: HOSPITALIST

## 2018-10-03 PROCEDURE — 84100 ASSAY OF PHOSPHORUS: CPT | Performed by: HOSPITALIST

## 2018-10-03 PROCEDURE — 12000000 ZZH R&B MED SURG/OB

## 2018-10-03 PROCEDURE — 99232 SBSQ HOSP IP/OBS MODERATE 35: CPT | Performed by: HOSPITALIST

## 2018-10-03 PROCEDURE — 25000132 ZZH RX MED GY IP 250 OP 250 PS 637: Mod: GY | Performed by: HOSPITALIST

## 2018-10-03 PROCEDURE — 36415 COLL VENOUS BLD VENIPUNCTURE: CPT | Performed by: HOSPITALIST

## 2018-10-03 PROCEDURE — 25000132 ZZH RX MED GY IP 250 OP 250 PS 637: Mod: GY | Performed by: INTERNAL MEDICINE

## 2018-10-03 PROCEDURE — 80053 COMPREHEN METABOLIC PANEL: CPT | Performed by: HOSPITALIST

## 2018-10-03 PROCEDURE — A9270 NON-COVERED ITEM OR SERVICE: HCPCS | Mod: GY | Performed by: INTERNAL MEDICINE

## 2018-10-03 PROCEDURE — 84132 ASSAY OF SERUM POTASSIUM: CPT | Performed by: HOSPITALIST

## 2018-10-03 RX ORDER — POLYETHYLENE GLYCOL 3350 17 G/17G
17 POWDER, FOR SOLUTION ORAL 2 TIMES DAILY PRN
Status: DISCONTINUED | OUTPATIENT
Start: 2018-10-03 | End: 2018-10-05 | Stop reason: HOSPADM

## 2018-10-03 RX ADMIN — NICOTINE 1 PATCH: 21 PATCH, EXTENDED RELEASE TRANSDERMAL at 09:43

## 2018-10-03 RX ADMIN — ACETAMINOPHEN 650 MG: 325 TABLET, FILM COATED ORAL at 19:47

## 2018-10-03 RX ADMIN — MULTIPLE VITAMINS W/ MINERALS TAB 1 TABLET: TAB at 09:42

## 2018-10-03 RX ADMIN — Medication 100 MG: at 09:42

## 2018-10-03 RX ADMIN — TAMSULOSIN HYDROCHLORIDE 0.8 MG: 0.4 CAPSULE ORAL at 09:42

## 2018-10-03 RX ADMIN — ACETAMINOPHEN 650 MG: 325 TABLET, FILM COATED ORAL at 00:11

## 2018-10-03 RX ADMIN — OMEPRAZOLE 40 MG: 20 CAPSULE, DELAYED RELEASE ORAL at 16:18

## 2018-10-03 RX ADMIN — OMEPRAZOLE 40 MG: 20 CAPSULE, DELAYED RELEASE ORAL at 09:42

## 2018-10-03 RX ADMIN — OXYCODONE HYDROCHLORIDE 5 MG: 5 TABLET ORAL at 23:19

## 2018-10-03 RX ADMIN — OXYCODONE HYDROCHLORIDE 5 MG: 5 TABLET ORAL at 19:15

## 2018-10-03 RX ADMIN — DULOXETINE HYDROCHLORIDE 60 MG: 60 CAPSULE, DELAYED RELEASE ORAL at 09:42

## 2018-10-03 RX ADMIN — FOLIC ACID 1 MG: 1 TABLET ORAL at 09:42

## 2018-10-03 RX ADMIN — HYDROCHLOROTHIAZIDE 25 MG: 25 TABLET ORAL at 09:42

## 2018-10-03 RX ADMIN — OXYCODONE HYDROCHLORIDE 5 MG: 5 TABLET ORAL at 09:46

## 2018-10-03 RX ADMIN — MIRTAZAPINE 30 MG: 15 TABLET, FILM COATED ORAL at 21:04

## 2018-10-03 RX ADMIN — SPIRONOLACTONE 25 MG: 25 TABLET, FILM COATED ORAL at 09:42

## 2018-10-03 RX ADMIN — POTASSIUM CHLORIDE 20 MEQ: 1500 TABLET, EXTENDED RELEASE ORAL at 16:18

## 2018-10-03 RX ADMIN — ATORVASTATIN CALCIUM 10 MG: 10 TABLET, FILM COATED ORAL at 21:04

## 2018-10-03 ASSESSMENT — ACTIVITIES OF DAILY LIVING (ADL)
ADLS_ACUITY_SCORE: 11
ADLS_ACUITY_SCORE: 9
ADLS_ACUITY_SCORE: 10

## 2018-10-03 ASSESSMENT — PAIN DESCRIPTION - DESCRIPTORS
DESCRIPTORS: ACHING
DESCRIPTORS: ACHING;SHARP

## 2018-10-03 NOTE — CONSULTS
"Care Transition Initial Assessment -   Reason For Consult: discharge planning, facility placement  Met with: Patient    Principal Problem:    Hematemesis  Active Problems:    GI bleed    Alcohol abuse    Rib Fractures (right 7th, 8th, 9th) -- occured 9/9/2018    Alcoholic hepatitis without ascites    Acute blood loss anemia    Esophageal varices (EGD with 6 varices banded 4/27/18)    Essential hypertension    COPD (chronic obstructive pulmonary disease) (H)    Smoker    JANIS on CPAP    Chronic low back pain       DATA  Lives With: other (see comments) (roommate)  Living Arrangements: house  Who is your support system?: Sibling(s)  Identified issues/concerns regarding health management: PT recommends TCU. Pt reports he has been to [facility] in the past following a back surgery. He went to this facility because his mother was a patient there. She no longer is. He would not chose to return there. He was hesitant to agree to TCU. He reports he lives with a roommate who he thinks could help him at home but works outside of the home 8 hours a day. Explained TCU. Pt is in agreement to referrals \"in this area.\" Did explain to pt some facilities may decline due to ETOH use.      Transportation Available: car (Pt drives at baseline.)    ASSESSMENT  Cognitive Status: Alert and oriented per nursing.  Concerns to be addressed: On-going discharge planning. On-going ETOH use may be a barrier to placement.     PLAN  Financial costs for the patient includes: None.  Patient given options and choices for discharge: Yes.  Patient/family is agreeable to the plan? YES  Patient Goals and Preferences: TCU.  Patient anticipates discharging to: TCU.    JULIA John, [facility]  [id number]        "

## 2018-10-03 NOTE — PLAN OF CARE
Problem: Patient Care Overview  Goal: Plan of Care/Patient Progress Review  Outcome: No Change   Pt A/O, VSS, 1L NC 90's. CIWA scale 5, 7. Slept well this shift. C/o right rib area pain- given Tylenol w/ relief. Nicotine patch to RIGHT deltoid. Bed alarms on and active. Up A1+ w+ GB. Regular diet. No s/s of bleeding this shift.

## 2018-10-03 NOTE — PROGRESS NOTES
[facility]    Hospitalist Progress Note      Assessment & Plan   Jim Richard is a 63 year old male who was admitted on 9/29/2018 with ongoing alcohol abuse and vomited blood.  No active bleeding on EGD.  No further hematemesis.    He is in simple alcohol withdrawal and is being treated with diazepam.    Hematemesis, upper gastrointestinal hemorrhage   Acute blood loss anemia  Esophageal varices (EGD with 6 varices banded 4/27/18)  Stable/resolved  EGD 9/29/18 with grade I varices and no active bleeding  Hemoglobin  stable    Alcohol abuse now in uncomplicated alcohol withdrawal   No diazepam given overnight.  He looks better this morning- no tremor, less anxious.  Continue diazepam per CIWA scoring    Hypokalemia and hypomagnesemia   Replace per protocol    Rib Fractures (right 7th, 8th, 9th) -- occured 9/9/2018  Stable     Alcoholic hepatitis without ascites  Repeat LFT's in morning     Essential hypertension  Stable     COPD (chronic obstructive pulmonary disease)  Smoker  Stable     JANIS on CPAP  Stable     Chronic low back pain  Oxycodone to 5 mg qid prn    Urinary retention  Leger placed  Continue Flomax to 0.8 mg daily  Voiding trial today    DVT Prophylaxis: Pneumatic Compression Devices  Code Status: DNR/DNI    Disposition: Expected discharge  TBD    Mauricio Ramírez MD  Text Page (7am - 6pm)    Interval History   Having some expected rib pain from the fractures  No shortness of breath     -Data reviewed today: I reviewed all new labs and imaging results over the last 24 hours. I personally reviewed no images or EKG's today.    Physical Exam   Temp: 98.1  F (36.7  C) Temp src: Oral BP: 138/84   Heart Rate: 76 Resp: 16 SpO2: 94 % O2 Device: None (Room air) Oxygen Delivery: 1 LPM  There were no vitals filed for this visit.  Vital Signs with Ranges  Temp:  [98.1  F (36.7  C)-99.1  F (37.3  C)] 98.1  F (36.7  C)  Heart Rate:  [71-91] 76  Resp:  [15-18] 16  BP: (113-155)/(70-86)  138/84  SpO2:  [91 %-96 %] 94 %  I/O last 3 completed shifts:  In: 760 [P.O.:760]  Out: 1325 [Urine:1325]    Constitutional: Awake, alert, cooperative, no apparent distress- looks weak but less acutely ill appearing today  Respiratory: Clear to auscultation bilaterally, no crackles or wheezing  Cardiovascular: Regular rate and rhythm, normal S1 and S2, and no murmur noted  GI: Normal bowel sounds, soft, non-distended, non-tender  Skin/Integumen: No rashes, no cyanosis, no edema  Other: No UE tremor    Medications       atorvastatin (LIPITOR) tablet 10 mg  10 mg Oral At Bedtime     DULoxetine  60 mg Oral Daily     fluticasone-vilanterol  1 puff Inhalation Daily     folic acid  1 mg Oral Daily     spironolactone  25 mg Oral Daily    And     hydrochlorothiazide  25 mg Oral Daily     influenza vaccine adult (product based on age)  0.5 mL Intramuscular Prior to discharge     mirtazapine (REMERON) tablet 30 mg  30 mg Oral At Bedtime     multivitamin, therapeutic with minerals  1 tablet Oral Daily     nicotine  1 patch Transdermal Daily     nicotine   Transdermal Daily     omeprazole  40 mg Oral BID AC     tamsulosin (FLOMAX) capsule 0.8 mg  0.8 mg Oral Daily       Data     Recent Labs  Lab 10/03/18  0758 10/02/18  1745 10/02/18  0745 10/01/18  0730 09/30/18  0050  09/29/18  0550   WBC  --   --  4.4 4.7  --   --  6.1   HGB  --   --  8.4* 8.4* 8.3*  < > 10.1*   MCV  --   --  75* 76*  --   --  74*   PLT  --   --  104* 105*  --   --  207   INR  --   --   --   --   --   --  1.10     --   --  138  --   --  142   POTASSIUM 3.9 4.3 3.3* 3.3*  --   --  3.4   CHLORIDE 105  --   --  104  --   --  106   CO2 28  --   --  28  --   --  23   BUN 9  --   --  10  --   --  16   CR 0.85  --   --  0.80  --   --  0.79   ANIONGAP 4  --   --  6  --   --  13   KEV 8.5  --   --  8.2*  --   --  8.1*   GLC 92  --   --  111*  --   --  71   ALBUMIN 2.6*  --   --   --   --   --  3.2*   PROTTOTAL 6.1*  --   --   --   --   --  7.2   BILITOTAL 1.0   --   --   --   --   --  1.0   ALKPHOS 297*  --   --   --   --   --  332*   ALT 64  --   --   --   --   --  60   AST 97*  --   --   --   --   --  140*   LIPASE  --   --   --   --   --   --  585*   < > = values in this interval not displayed.    No results found for this or any previous visit (from the past 24 hour(s)).

## 2018-10-03 NOTE — PLAN OF CARE
Problem: Patient Care Overview  Goal: Plan of Care/Patient Progress Review  Outcome: Improving  A&Ox4. Calm, cooperative. Follows commands. CIWA 3, 2, 3. No indication for PRN valium. Weaned to room air. Denies SOB. C/o pain to ribcage from PTA fractures, PRN oxycodone given x1 with some relief. Tolerating regular diet, good appetite/intake. K 3.9. Replaced. Recheck in AM. Voiding adequately in urinal at bedside. Up with A1 + walker + GB. Plan for possible discharge to TCU tomorrow. SW following for placement.

## 2018-10-03 NOTE — PLAN OF CARE
Problem: Patient Care Overview  Goal: Plan of Care/Patient Progress Review  Outcome: No Change  Cared for pt from 1700- 2300, A&Ox4, CIWA score of 3. VSS on 1 L NC. Denies pain, oxycodone available. Regular diet, good appetite. Voiding well in BR, jiang removed on days. K and Mag replaced now WDL. Phos replaced recheck in AM. Up with 2 + walker/GB. 2 PIV SL'd.PRN Trazodone given x1 before bed. Nicotine patch on R deltoid. Discharge date pending.

## 2018-10-04 ENCOUNTER — APPOINTMENT (OUTPATIENT)
Dept: PHYSICAL THERAPY | Facility: CLINIC | Age: 63
DRG: 377 | End: 2018-10-04
Payer: MEDICARE

## 2018-10-04 LAB
MAGNESIUM SERPL-MCNC: 1.7 MG/DL (ref 1.6–2.3)
PHOSPHATE SERPL-MCNC: 3.2 MG/DL (ref 2.5–4.5)
POTASSIUM SERPL-SCNC: 3.8 MMOL/L (ref 3.4–5.3)

## 2018-10-04 PROCEDURE — 25000132 ZZH RX MED GY IP 250 OP 250 PS 637: Mod: GY | Performed by: HOSPITALIST

## 2018-10-04 PROCEDURE — 83735 ASSAY OF MAGNESIUM: CPT | Performed by: HOSPITALIST

## 2018-10-04 PROCEDURE — 97530 THERAPEUTIC ACTIVITIES: CPT | Mod: GP | Performed by: PHYSICAL THERAPIST

## 2018-10-04 PROCEDURE — A9270 NON-COVERED ITEM OR SERVICE: HCPCS | Mod: GY | Performed by: INTERNAL MEDICINE

## 2018-10-04 PROCEDURE — 84100 ASSAY OF PHOSPHORUS: CPT | Performed by: HOSPITALIST

## 2018-10-04 PROCEDURE — 84132 ASSAY OF SERUM POTASSIUM: CPT | Performed by: HOSPITALIST

## 2018-10-04 PROCEDURE — A9270 NON-COVERED ITEM OR SERVICE: HCPCS | Mod: GY | Performed by: HOSPITALIST

## 2018-10-04 PROCEDURE — 12000000 ZZH R&B MED SURG/OB

## 2018-10-04 PROCEDURE — 97116 GAIT TRAINING THERAPY: CPT | Mod: GP | Performed by: PHYSICAL THERAPIST

## 2018-10-04 PROCEDURE — 25000132 ZZH RX MED GY IP 250 OP 250 PS 637: Mod: GY | Performed by: INTERNAL MEDICINE

## 2018-10-04 PROCEDURE — 99238 HOSP IP/OBS DSCHRG MGMT 30/<: CPT | Performed by: INTERNAL MEDICINE

## 2018-10-04 PROCEDURE — 36415 COLL VENOUS BLD VENIPUNCTURE: CPT | Performed by: HOSPITALIST

## 2018-10-04 PROCEDURE — 25000128 H RX IP 250 OP 636: Performed by: INTERNAL MEDICINE

## 2018-10-04 PROCEDURE — 90682 RIV4 VACC RECOMBINANT DNA IM: CPT | Performed by: INTERNAL MEDICINE

## 2018-10-04 PROCEDURE — 99207 ZZC CDG-CODE CATEGORY CHANGED: CPT | Performed by: INTERNAL MEDICINE

## 2018-10-04 PROCEDURE — 40000193 ZZH STATISTIC PT WARD VISIT: Performed by: PHYSICAL THERAPIST

## 2018-10-04 PROCEDURE — 99207 ZZC APP CREDIT; MD BILLING SHARED VISIT: CPT | Performed by: NURSE PRACTITIONER

## 2018-10-04 RX ORDER — OXYCODONE HYDROCHLORIDE 5 MG/1
5 TABLET ORAL 4 TIMES DAILY PRN
Qty: 10 TABLET | Refills: 0 | Status: SHIPPED | DISCHARGE
Start: 2018-10-04 | End: 2018-10-04

## 2018-10-04 RX ORDER — MULTIPLE VITAMINS W/ MINERALS TAB 9MG-400MCG
1 TAB ORAL DAILY
Qty: 30 EACH | Refills: 0 | Status: ON HOLD | DISCHARGE
Start: 2018-10-05 | End: 2020-04-08

## 2018-10-04 RX ORDER — OXYCODONE HYDROCHLORIDE 5 MG/1
5 TABLET ORAL 4 TIMES DAILY PRN
Qty: 10 TABLET | Refills: 0 | Status: SHIPPED | OUTPATIENT
Start: 2018-10-04 | End: 2018-12-06

## 2018-10-04 RX ADMIN — OXYCODONE HYDROCHLORIDE 5 MG: 5 TABLET ORAL at 17:02

## 2018-10-04 RX ADMIN — ACETAMINOPHEN 650 MG: 325 TABLET, FILM COATED ORAL at 04:11

## 2018-10-04 RX ADMIN — Medication 1 MG: at 00:21

## 2018-10-04 RX ADMIN — TAMSULOSIN HYDROCHLORIDE 0.8 MG: 0.4 CAPSULE ORAL at 08:00

## 2018-10-04 RX ADMIN — POTASSIUM CHLORIDE 20 MEQ: 1500 TABLET, EXTENDED RELEASE ORAL at 11:14

## 2018-10-04 RX ADMIN — ACETAMINOPHEN 650 MG: 325 TABLET, FILM COATED ORAL at 08:15

## 2018-10-04 RX ADMIN — FOLIC ACID 1 MG: 1 TABLET ORAL at 08:00

## 2018-10-04 RX ADMIN — OXYCODONE HYDROCHLORIDE 5 MG: 5 TABLET ORAL at 21:21

## 2018-10-04 RX ADMIN — DULOXETINE HYDROCHLORIDE 60 MG: 60 CAPSULE, DELAYED RELEASE ORAL at 08:00

## 2018-10-04 RX ADMIN — INFLUENZA A VIRUS A/MICHIGAN/45/2015 (H1N1) RECOMBINANT HEMAGGLUTININ ANTIGEN, INFLUENZA A VIRUS A/SINGAPORE/INFIMH-16-0019/2016 (H3N2) RECOMBINANT HEMAGGLUTININ ANTIGEN, INFLUENZA B VIRUS B/MARYLAND/15/2016 RECOMBINANT HEMAGGLUTININ ANTIGEN, AND INFLUENZA B VIRUS B/PHUKET/3073/2013 RECOMBINANT HEMAGGLUTININ ANTIGEN 0.5 ML: 45; 45; 45; 45 INJECTION INTRAMUSCULAR at 11:14

## 2018-10-04 RX ADMIN — ATORVASTATIN CALCIUM 10 MG: 10 TABLET, FILM COATED ORAL at 21:21

## 2018-10-04 RX ADMIN — MULTIPLE VITAMINS W/ MINERALS TAB 1 TABLET: TAB at 07:59

## 2018-10-04 RX ADMIN — NICOTINE 1 PATCH: 21 PATCH, EXTENDED RELEASE TRANSDERMAL at 07:56

## 2018-10-04 RX ADMIN — HYDROXYZINE HYDROCHLORIDE 50 MG: 25 TABLET ORAL at 19:37

## 2018-10-04 RX ADMIN — OMEPRAZOLE 40 MG: 20 CAPSULE, DELAYED RELEASE ORAL at 17:02

## 2018-10-04 RX ADMIN — SPIRONOLACTONE 25 MG: 25 TABLET, FILM COATED ORAL at 08:00

## 2018-10-04 RX ADMIN — OXYCODONE HYDROCHLORIDE 5 MG: 5 TABLET ORAL at 08:15

## 2018-10-04 RX ADMIN — ACETAMINOPHEN 650 MG: 325 TABLET, FILM COATED ORAL at 19:28

## 2018-10-04 RX ADMIN — HYDROCHLOROTHIAZIDE 25 MG: 25 TABLET ORAL at 08:00

## 2018-10-04 RX ADMIN — METHOCARBAMOL 750 MG: 750 TABLET ORAL at 04:11

## 2018-10-04 RX ADMIN — FLUTICASONE FUROATE AND VILANTEROL TRIFENATATE 1 PUFF: 200; 25 POWDER RESPIRATORY (INHALATION) at 08:00

## 2018-10-04 RX ADMIN — METHOCARBAMOL 750 MG: 750 TABLET ORAL at 23:49

## 2018-10-04 RX ADMIN — MIRTAZAPINE 30 MG: 15 TABLET, FILM COATED ORAL at 21:21

## 2018-10-04 RX ADMIN — OMEPRAZOLE 40 MG: 20 CAPSULE, DELAYED RELEASE ORAL at 07:59

## 2018-10-04 ASSESSMENT — ACTIVITIES OF DAILY LIVING (ADL)
ADLS_ACUITY_SCORE: 9
ADLS_ACUITY_SCORE: 11
ADLS_ACUITY_SCORE: 10
ADLS_ACUITY_SCORE: 11

## 2018-10-04 NOTE — DISCHARGE SUMMARY
[facility]    Discharge Summary  Hospitalist    Date of Admission:  9/29/2018  Date of Discharge:  10/4/2018  Discharging Provider: Jude Duarte  Date of Service (when I saw the patient): 10/04/18      History of Present Illness  From admission H&P  63 year old male who is an alcoholic, has liver disease with esophagela varices and was admitted in April 2018 with Esophageal varice bleed (6 bands placed) who is actively drinking, presents with vomiting small amount of blood twice at 0400 today and called 911.  He has been drinking Vodka 0.75 liter/day and admits to be doing so for the last 3 months, brother says it has been longer.  He wants to quit and plans to go to  at discharge -- has gone to several treatment programs in the past, and says he recently quit for 9 months (brother says it was more like 3 months).  Last admitted on 4/26/18 to 5/4/18 -- EGD then showed grade III esophageal varices and 6 bands were placed.  His hgb dropped to 5.8 then and he got 2 units of blood.  His last drink was MN (8 hours ago), and he denies melena or BRBPR.  He recalls falling 20 days ago (9/9/18) and landed on right side and it has hurt since then, and he has broken ribs on left side in the past as well.  He stopped all his medicines about ?3 weeks ago.     Hospital Course   Jim Richard was admitted on 9/29/2018.  The following problems were addressed during his hospitalization:    Hematemesis with upper gastrointestinal hemorrhage likely due to duodenal ulcer and varices  Acute blood loss anemia  Esophageal varices (EGD with 6 varices banded 4/27/18)  Duodenal ulcer  EGD 9/29/18 with grade I varices, non bleeding duodenal ulcer and gastric mucosa congestion but no active bleeding at the time.  Did not require transfusion with PRBCs.  hgb 10.4 on admission, down to 8.4 on discharge.  Repeat hgb in 5 days.  Continues on BID PPI     Alcohol abuse with alcohol withdrawal   Pt required valium with  CIWA scoring but has not needed any for two days prior to discharge.  Received thiamine and folate and will continue on MVI on discharge.  Encouraged cessation.      Hypokalemia and hypomagnesemia   Replaced per protocol     Rib Fractures (right 7th, 8th, 9th) -- occured 9/9/2018  Has ongoing pain so will be given a limited number of oxycodone to use at TCU     Alcoholic hepatitis without ascites  Improving and likely due to alcohol.  Repeat LFTs in 5 days.      Essential hypertension  Continue hydrochlorothiazide and spironolactone     COPD (chronic obstructive pulmonary disease)  Tobacco dependence  Continue PTA inhaler regimen     JANIS on CPAP     Chronic low back pain  Oxycodone to 5 mg qid prn     Urinary retention  Leger placed but did well with a voiding trial prior to discharge.   Continue PTA Flomax    Jude Duarte    Significant Results and Procedures   EGD 9/29    Findings:        Grade I varices were found in the lower third of the esophagus. They        were 5 mm in largest diameter.        Moderte portal gastropathy.        The cardia and gastric fundus were normal on retroflexion.        Four non-bleeding superficial duodenal ulcers were found in the duodenal        bulb and in the first portion of the duodenum. The largest lesion was 10        mm in largest dimension.        Diffuse mildly congested mucosa without active bleeding and with no        stigmata of bleeding was found in the second portion of the duodenum.                                                                                     Impression:               - Grade I esophageal varices.                             - Multiple non-bleeding duodenal ulcers.                             - Congested duodenal mucosa.                             - No specimens collected.   Recommendation:           - Return patient to hospital basilio for ongoing care.                             - Use Protonix (pantoprazole) 80 mg IV daily.     Pending  Results   These results will be followed up by PCP  Unresulted Labs Ordered in the Past 30 Days of this Admission     Date and Time Order Name Status Description    9/29/2018 1327 Blood culture Preliminary     9/29/2018 1327 Blood culture Preliminary           Code Status   DNR / DNI       Primary Care Physician   FERNANDA BRANTLEY    General: sitting in bed, appears comfortable  Cardiovascular: RRR  Pulmonary: CTAB  GI: +BS, soft  Lymphatics: no edema    Discharge Disposition   Discharged to short-term care facility  Condition at discharge: Stable    Consultations This Hospital Stay   GASTROENTEROLOGY IP CONSULT  PHYSICAL THERAPY ADULT IP CONSULT  CARE TRANSITION RN/SW IP CONSULT  PHYSICAL THERAPY ADULT IP CONSULT  OCCUPATIONAL THERAPY ADULT IP CONSULT    Time Spent on this Encounter   I, Jude Duarte, personally saw the patient today and spent less than or equal to 30 minutes discharging this patient.    Discharge Orders     General info for SNF   Length of Stay Estimate: Short Term Care: Estimated # of Days <30  Condition at Discharge: Improving  Level of care:skilled   Rehabilitation Potential: Good  Admission H&P remains valid and up-to-date: Yes  Recent Chemotherapy: N/A  Use Nursing Home Standing Orders: Yes     Mantoux instructions   Give two-step Mantoux (PPD) Per Facility Policy Yes     Follow Up and recommended labs and tests   Follow up with PCP in 1 week ++ repeat Hgb and LFTs in 5 days     Activity - Up with nursing assistance     DNR (Do Not Resuscitate)     Physical Therapy Adult Consult   Evaluate and treat as clinically indicated.    Reason:  Weakness and deconditioning     Occupational Therapy Adult Consult   Evaluate and treat as clinically indicated.    Reason:  Weakness and deconditioning     Fall precautions     Advance Diet as Tolerated   Follow this diet upon discharge: Orders Placed This Encounter     Regular Diet Adult       Discharge Medications   Current Discharge Medication  List      START taking these medications    Details   multivitamin, therapeutic with minerals (THERA-VIT-M) TABS tablet Take 1 tablet by mouth daily  Qty: 30 each, Refills: 0    Associated Diagnoses: Alcoholic intoxication without complication (H)      oxyCODONE IR (ROXICODONE) 5 MG tablet Take 1 tablet (5 mg) by mouth 4 times daily as needed for pain  Qty: 10 tablet, Refills: 0    Associated Diagnoses: Closed fracture of multiple ribs of right side, initial encounter         CONTINUE these medications which have NOT CHANGED    Details   albuterol (PROAIR HFA/PROVENTIL HFA/VENTOLIN HFA) 108 (90 BASE) MCG/ACT Inhaler Inhale 2 puffs into the lungs every 6 hours as needed       ATORVASTATIN CALCIUM PO Take 10 mg by mouth At Bedtime       DULoxetine (CYMBALTA) 60 MG EC capsule Take 1 capsule (60 mg) by mouth daily  Qty: 30 capsule, Refills: 0    Associated Diagnoses: 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)      fluticasone-vilanterol (BREO ELLIPTA) 200-25 MCG/INH oral inhaler Inhale 1 puff into the lungs daily as needed       hydrOXYzine (ATARAX) 25 MG tablet Take 2 tablets (50 mg) by mouth nightly as needed (sleep)  Qty: 30 tablet, Refills: 0      methocarbamol (ROBAXIN) 750 MG tablet Take 1 tablet (750 mg) by mouth 3 times daily as needed for muscle spasms  Qty: 15 tablet, Refills: 0      MIRTAZAPINE PO Take 30 mg by mouth At Bedtime      omeprazole (PRILOSEC) 40 MG capsule Take 1 capsule (40 mg) by mouth 2 times daily (before meals) Take 30-60 minutes before a meal.  Qty: 60 capsule, Refills: 1    Associated Diagnoses: Hematemesis without nausea      QUETIAPINE FUMARATE PO Take 50 mg by mouth 3 times daily as needed      spironolactone-hydrochlorothiazide (ALDACTAZIDE) 25-25 MG per tablet Take 1 tablet by mouth daily      TAMSULOSIN HCL PO Take 0.4 mg by mouth daily      TRAZODONE HCL PO Take 100 mg by mouth nightly as needed (Takes 2 x 100mg for a total of 200mg at bedtime)            Allergies   No Known Allergies  Data   Most Recent 3 CBC's:  Recent Labs   Lab Test  10/02/18   0745  10/01/18   0730  09/30/18   0050   09/29/18   0550   WBC  4.4  4.7   --    --   6.1   HGB  8.4*  8.4*  8.3*   < >  10.1*   MCV  75*  76*   --    --   74*   PLT  104*  105*   --    --   207    < > = values in this interval not displayed.      Most Recent 3 BMP's:  Recent Labs   Lab Test  10/04/18   0750  10/03/18   0758  10/02/18   1745   10/01/18   0730  09/29/18   0550   NA   --   137   --    --   138  142   POTASSIUM  3.8  3.9  4.3   < >  3.3*  3.4   CHLORIDE   --   105   --    --   104  106   CO2   --   28   --    --   28  23   BUN   --   9   --    --   10  16   CR   --   0.85   --    --   0.80  0.79   ANIONGAP   --   4   --    --   6  13   KEV   --   8.5   --    --   8.2*  8.1*   GLC   --   92   --    --   111*  71    < > = values in this interval not displayed.     Most Recent 2 LFT's:  Recent Labs   Lab Test  10/03/18   0758  09/29/18   0550   AST  97*  140*   ALT  64  60   ALKPHOS  297*  332*   BILITOTAL  1.0  1.0     Most Recent INR's and Anticoagulation Dosing History:  Anticoagulation Dose History     Recent Dosing and Labs Latest Ref Rng & Units 3/21/2018 3/22/2018 4/26/2018 4/29/2018 4/30/2018 5/21/2018 9/29/2018    INR 0.86 - 1.14 1.31(H) 1.34(H) 1.52(H) 1.21(H) 1.13 1.08 1.10        Most Recent 3 Troponin's:  Recent Labs   Lab Test  04/26/18   1940   TROPI  <0.015     Most Recent Cholesterol Panel:  Recent Labs   Lab Test  06/12/12   0530   CHOL  198   LDL  81   HDL  95   TRIG  106     Most Recent 6 Bacteria Isolates From Any Culture (See EPIC Reports for Culture Details):  Recent Labs   Lab Test  09/29/18   1359  09/29/18   1352  05/02/18   1535  05/02/18   1530  10/08/16   1150   CULT  No growth after 5 days  No growth after 5 days  No growth  No growth  Moderate growth Normal trevin     Most Recent TSH, T4 and A1c Labs:  Recent Labs   Lab Test  03/22/18   0430  02/08/17   1640   TSH   --   1.51    A1C  Canceled, Test credited   --      Results for orders placed or performed during the hospital encounter of 09/29/18   CT Head w/o Contrast    Narrative    CT OF THE HEAD WITHOUT CONTRAST 9/29/2018 7:23 AM     COMPARISON: Brain MRI 4/28/2018.    HISTORY:  History of subdural hematoma, fall.     TECHNIQUE: 5 mm thick axial CT images of the head were acquired  without IV contrast material.    FINDINGS:  There is mild diffuse cerebral volume loss. There are  subtle patchy areas of decreased density in the cerebral white matter  bilaterally that are consistent with sequela of chronic small vessel  ischemic disease.     The ventricles and basal cisterns are within normal limits in  configuration given the degree of cerebral volume loss.  There is no  midline shift. There are no extra-axial fluid collections.     No intracranial hemorrhage, mass or recent infarct.    The visualized paranasal sinuses are well-aerated. There is no  mastoiditis. There are no fractures of the visualized bones.       Impression    IMPRESSION:  Diffuse cerebral volume loss and cerebral white matter  changes consistent with chronic small vessel ischemic disease. No  evidence for acute intracranial pathology. This represents interval  resolution of the right cerebral convexity subdural collection noted  on the comparison MRI.    Radiation dose for this scan was reduced using automated exposure  control, adjustment of the mA and/or kV according to patient size, or  iterative reconstruction technique.    MARLO SANDERS MD   CT Abdomen Pelvis w Contrast    Narrative    Exam: CT ABDOMEN PELVIS W CONTRAST  9/29/2018 7:23 AM    History: Fall with pain over the RIGHT upper quadrant.    Comparison: 10/7/2016    Technique: Volumetric acquisition with reconstruction in the axial,  coronal planes through the abdomen and pelvis with contrast. Radiation  dose for this scan was reduced using automated exposure control,  adjustment of the mA and/or kV  according to patient size, or iterative  reconstruction technique.    Contrast: 95 mL Isovue-370    Findings:   Lung Bases: Very mild peripheral opacities in the posterior RIGHT  lower lobe. Lung bases are otherwise clear. No pleural or pericardial  effusion.    Abdomen: Hepatic steatosis, liver otherwise appears normal. Spleen,  adrenal glands, kidneys, pancreas and gallbladder appear normal.    No areas of bowel wall thickening or bowel dilatation. No free fluid.  No abdominal or pelvic lymphadenopathy. Pelvic structures are  unremarkable. Atherosclerotic calcifications of the abdominal aorta  and its branch vessels without aneurysmal dilatation.    Bones: Mildly displaced acute fractures involving the RIGHT seventh,  eighth and ninth ribs as well as chronic fractures involving multiple  ribs on both sides. There may be additional rib fractures more  superiorly, but these are not included in this study.      Impression    Impression:   1. Mildly displaced acute fractures involving the RIGHT seventh,  eighth and ninth ribs. Additional rib fractures may be present, but  are not included in this study. Mild underlying opacities in the RIGHT  lower lobe may represent mild pulmonary contusion.  2. Hepatic steatosis.    VIVIAN SHELDON MD   Ribs XR, unilat 3 views + PA chest, right    Narrative    RIGHT RIBS AND CHEST THREE VIEWS  9/29/2018 8:01 AM     COMPARISON:  Two-view chest x-ray 3/21/2019.    HISTORY: fall rib fractures;     FINDINGS: There are nondisplaced fractures of the right fifth and  eighth ribs posteriorly and moderately displaced fractures of the  right sixth and seventh ribs posteriorly. No other rib fractures  noted. The cardiac silhouette, pulmonary vasculature, lungs and  pleural spaces are within normal limits.      Impression    IMPRESSION: Clear lungs. Right rib fractures as detailed above.    MARLO SANDERS MD

## 2018-10-04 NOTE — SIGNIFICANT EVENT
Pt came to nurses' desk and stated he fell in his room.  Right laceration noted on lower forearm.  Laceration covered with 4x4.  House MD paged.  Pt assessed, decision was made to keep pt overnight for observation.

## 2018-10-04 NOTE — PLAN OF CARE
Problem: Patient Care Overview  Goal: Plan of Care/Patient Progress Review  Outcome: Improving  Oxycodone,tylenol and ice help relieve R rib pain. Ambulated, walker, belt ,1 assist in muniz. Up in chair. Good appetite. Voiding well/ urinal. Plan TCU when bed available.

## 2018-10-04 NOTE — PROGRESS NOTES
"[facility]    Fall Note  10/4/2018   Time Called: 5: 55 PM    Assessment & Plan   Fall, likely mechanical, unwitnessed   Per nursing patient walked unassisted to desk to tell staff he fell. By his report he got out of chair, took 2- steps, and fell. He states he landed on his right side \"in the closet.\" He has no obvious head injury, does have a right forearm abrasion/contusion without obvious bony crepitus or step off. He has full range of motion, has been weight bearing. Neck is non-tender. Mr. Richard is expected to discharge tonight to TCU.     INTERVENTIONS:  - from a medical/legal perspective we should observe him overnight, check with TCU in AM regarding fall in-hosptial   - continue nursing surveillance as not all injuries are apparent at the time of fall     Discussed with and defer further cares to Dr. Snyder, Hospitalist.      MADISON Ramirez, CNP  Hospitalist Service, House Officer  [facility]     Text Page  Pager: [phone]    Allergies   No Known Allergies    Physical Exam   Vital Signs with Ranges:  Temp:  [96.2  F (35.7  C)-100.3  F (37.9  C)] 98.1  F (36.7  C)  Heart Rate:  [68-97] 97  Resp:  [16-18] 18  BP: (107-144)/(73-83) 144/82  SpO2:  [92 %-95 %] 95 %  I/O last 3 completed shifts:  In: -   Out: 1675 [Urine:1675]    Constitutional: 63- year old male sitting in chair.   ENT: Atraumatic.    Neck: Non-tender to midline palpation.   Pulmonary: No hypoxia, respirations easy and non-tender.   Cardiovascular: Warm and well perfused.   Skin/Integumen: Small skin tear, right forearm.   Neuro: Alert, oriented x 3.   Psych:  Calm, cooperative.   Extremities: Skin tear right arm.     CBC with Diff:  Recent Labs   Lab Test  10/02/18   0745   09/29/18   0550   WBC  4.4   < >  6.1   HGB  8.4*   < >  10.1*   MCV  75*   < >  74*   PLT  104*   < >  207   INR   --    --   1.10    < > = values in this interval not displayed.      Lactic Acid:    Lab Results   Component " Value Date    LACT 1.1 09/30/2018           Comprehensive Metabolic Panel:    Recent Labs  Lab 10/04/18  0750 10/03/18  0758   NA  --  137   POTASSIUM 3.8 3.9   CHLORIDE  --  105   CO2  --  28   ANIONGAP  --  4   GLC  --  92   BUN  --  9   CR  --  0.85   GFRESTIMATED  --  >90   GFRESTBLACK  --  >90   KEV  --  8.5   MAG 1.7 1.8   PHOS 3.2 3.0   PROTTOTAL  --  6.1*   ALBUMIN  --  2.6*   BILITOTAL  --  1.0   ALKPHOS  --  297*   AST  --  97*   ALT  --  64       INR:    Recent Labs   Lab Test  09/29/18   0550   INR  1.10       Time Spent on this Encounter   I spent 20 minutes on the unit/floor managing the care of Jim Richard. Over 50% of my time was spent counseling the patient and/or coordinating care regarding services listed in this note.

## 2018-10-04 NOTE — PLAN OF CARE
Problem: Patient Care Overview  Goal: Plan of Care/Patient Progress Review  Discharge Planner PT   Patient plan for discharge: TCU  Current status: Pt is Macy for bed mobility, SBA/CGA with cues for sit to and from stand, Sarah for gait with walker for 50ft. Pt has decreased step length and is limited by leg weakness. Pt wore back brace for gait and needed assist to don brace.  Barriers to return to prior living situation: Pt is fall risk, requires assist for mobility  Recommendations for discharge: TCU  Rationale for recommendations: Pt is not independent and needs to have continued therapy to promote functional independence.       Entered by: Andreina Thrasher 10/04/2018 9:03 AM

## 2018-10-04 NOTE — PLAN OF CARE
Problem: Patient Care Overview  Goal: Plan of Care/Patient Progress Review  Outcome: No Change  Pt alert/oriented x4, complains of 8/10 pain, minimal relief with prn oxycodone. VSS, pt continent bowel/bladder. SBA when OOB. CIWA score at 0500 4, tolerating regular diet. Discharge possibly today. Will continue to monitor.

## 2018-10-05 ENCOUNTER — APPOINTMENT (OUTPATIENT)
Dept: PHYSICAL THERAPY | Facility: CLINIC | Age: 63
DRG: 377 | End: 2018-10-05
Payer: MEDICARE

## 2018-10-05 VITALS
BODY MASS INDEX: 29.76 KG/M2 | OXYGEN SATURATION: 93 % | HEIGHT: 67 IN | SYSTOLIC BLOOD PRESSURE: 141 MMHG | DIASTOLIC BLOOD PRESSURE: 77 MMHG | RESPIRATION RATE: 16 BRPM | TEMPERATURE: 98.8 F | HEART RATE: 86 BPM

## 2018-10-05 LAB
BACTERIA SPEC CULT: NO GROWTH
BACTERIA SPEC CULT: NO GROWTH
Lab: NORMAL
Lab: NORMAL
MAGNESIUM SERPL-MCNC: 1.7 MG/DL (ref 1.6–2.3)
PHOSPHATE SERPL-MCNC: 3.8 MG/DL (ref 2.5–4.5)
POTASSIUM SERPL-SCNC: 4.2 MMOL/L (ref 3.4–5.3)
SPECIMEN SOURCE: NORMAL
SPECIMEN SOURCE: NORMAL

## 2018-10-05 PROCEDURE — 36415 COLL VENOUS BLD VENIPUNCTURE: CPT | Performed by: INTERNAL MEDICINE

## 2018-10-05 PROCEDURE — 84100 ASSAY OF PHOSPHORUS: CPT | Performed by: INTERNAL MEDICINE

## 2018-10-05 PROCEDURE — 84132 ASSAY OF SERUM POTASSIUM: CPT | Performed by: INTERNAL MEDICINE

## 2018-10-05 PROCEDURE — 25000132 ZZH RX MED GY IP 250 OP 250 PS 637: Mod: GY | Performed by: INTERNAL MEDICINE

## 2018-10-05 PROCEDURE — 83735 ASSAY OF MAGNESIUM: CPT | Performed by: INTERNAL MEDICINE

## 2018-10-05 PROCEDURE — 97116 GAIT TRAINING THERAPY: CPT | Mod: GP | Performed by: PHYSICAL THERAPIST

## 2018-10-05 PROCEDURE — 97530 THERAPEUTIC ACTIVITIES: CPT | Mod: GP | Performed by: PHYSICAL THERAPIST

## 2018-10-05 PROCEDURE — A9270 NON-COVERED ITEM OR SERVICE: HCPCS | Mod: GY | Performed by: INTERNAL MEDICINE

## 2018-10-05 PROCEDURE — 99232 SBSQ HOSP IP/OBS MODERATE 35: CPT | Performed by: INTERNAL MEDICINE

## 2018-10-05 PROCEDURE — 40000193 ZZH STATISTIC PT WARD VISIT: Performed by: PHYSICAL THERAPIST

## 2018-10-05 RX ORDER — DIAZEPAM 5 MG
2.5-5 TABLET ORAL EVERY 8 HOURS PRN
Qty: 10 TABLET | Refills: 0 | Status: SHIPPED | OUTPATIENT
Start: 2018-10-05 | End: 2019-08-01

## 2018-10-05 RX ADMIN — HYDROCHLOROTHIAZIDE 25 MG: 25 TABLET ORAL at 08:27

## 2018-10-05 RX ADMIN — FLUTICASONE FUROATE AND VILANTEROL TRIFENATATE 1 PUFF: 200; 25 POWDER RESPIRATORY (INHALATION) at 08:26

## 2018-10-05 RX ADMIN — OXYCODONE HYDROCHLORIDE 5 MG: 5 TABLET ORAL at 01:31

## 2018-10-05 RX ADMIN — FOLIC ACID 1 MG: 1 TABLET ORAL at 08:27

## 2018-10-05 RX ADMIN — ACETAMINOPHEN 650 MG: 325 TABLET, FILM COATED ORAL at 03:36

## 2018-10-05 RX ADMIN — OMEPRAZOLE 40 MG: 20 CAPSULE, DELAYED RELEASE ORAL at 17:00

## 2018-10-05 RX ADMIN — NICOTINE 1 PATCH: 21 PATCH, EXTENDED RELEASE TRANSDERMAL at 08:26

## 2018-10-05 RX ADMIN — MULTIPLE VITAMINS W/ MINERALS TAB 1 TABLET: TAB at 08:27

## 2018-10-05 RX ADMIN — TAMSULOSIN HYDROCHLORIDE 0.8 MG: 0.4 CAPSULE ORAL at 08:27

## 2018-10-05 RX ADMIN — OMEPRAZOLE 40 MG: 20 CAPSULE, DELAYED RELEASE ORAL at 06:45

## 2018-10-05 RX ADMIN — SPIRONOLACTONE 25 MG: 25 TABLET, FILM COATED ORAL at 08:27

## 2018-10-05 RX ADMIN — OXYCODONE HYDROCHLORIDE 5 MG: 5 TABLET ORAL at 08:27

## 2018-10-05 RX ADMIN — DULOXETINE HYDROCHLORIDE 60 MG: 60 CAPSULE, DELAYED RELEASE ORAL at 08:27

## 2018-10-05 RX ADMIN — Medication 10 MG: at 04:50

## 2018-10-05 ASSESSMENT — ACTIVITIES OF DAILY LIVING (ADL)
ADLS_ACUITY_SCORE: 10

## 2018-10-05 NOTE — PLAN OF CARE
Problem: Patient Care Overview  Goal: Plan of Care/Patient Progress Review  Outcome: No Change  Mentation back to the same as yesterday, confusion cleared from last night. Is anxious about rehab.Tremor the same. Forgetful, bed and chair on, forgets to call. Remembers falling yesterday. Abrasion on right arm dry, polymem applied. Good appetite. Ambulated in halls with 1, walker and gait belt. Pain controlled with oxycodone. Plan discharge to rehab this laurel.

## 2018-10-05 NOTE — DISCHARGE SUMMARY
[facility]    Discharge Summary  Hospitalist    Date of Admission:  9/29/2018  Date of Discharge:  10/5/2018  Discharging Provider: Jude Duarte  Date of Service (when I saw the patient): 10/05/18      History of Present Illness  From admission H&P  63 year old male who is an alcoholic, has liver disease with esophagela varices and was admitted in April 2018 with Esophageal varice bleed (6 bands placed) who is actively drinking, presents with vomiting small amount of blood twice at 0400 today and called 911.  He has been drinking Vodka 0.75 liter/day and admits to be doing so for the last 3 months, brother says it has been longer.  He wants to quit and plans to go to  at discharge -- has gone to several treatment programs in the past, and says he recently quit for 9 months (brother says it was more like 3 months).  Last admitted on 4/26/18 to 5/4/18 -- EGD then showed grade III esophageal varices and 6 bands were placed.  His hgb dropped to 5.8 then and he got 2 units of blood.  His last drink was MN (8 hours ago), and he denies melena or BRBPR.  He recalls falling 20 days ago (9/9/18) and landed on right side and it has hurt since then, and he has broken ribs on left side in the past as well.  He stopped all his medicines about ?3 weeks ago.     Hospital Course   Jim Richard was admitted on 9/29/2018.  The following problems were addressed during his hospitalization:    Hematemesis with upper gastrointestinal hemorrhage likely due to duodenal ulcer and varices  Acute blood loss anemia  Esophageal varices (EGD with 6 varices banded 4/27/18)  Duodenal ulcer  EGD 9/29/18 with grade I varices, non bleeding duodenal ulcer and gastric mucosa congestion but no active bleeding at the time.  Did not require transfusion with PRBCs.  hgb 10.4 on admission, down to 8.4 on discharge.  Repeat hgb in 5 days.  Continues on BID PPI     Alcohol abuse with alcohol withdrawal   Pt required valium with  CIWA scoring but withdrawal seems to have resolved. He does have a baseline tremor as well as anxiety which complicates this.  He did receive a dose of valium on 10/5 which was mostly due to anxiety and after evaluation today I do not think that he is currently actively withdrawing.  Received thiamine and folate and will continue on MVI on discharge.  Encouraged cessation.     Anxiety: PTA on Cymbalta and hydroxyzine.  I am going to add limited prn valium for the transition period to TCU.       Hypokalemia and hypomagnesemia   Replaced per protocol     Rib Fractures (right 7th, 8th, 9th) -- occured 9/9/2018  Has ongoing pain so will be given a limited number of oxycodone to use at TCU     Alcoholic hepatitis without ascites  Improving and likely due to alcohol.  Repeat LFTs in 5 days.      Essential hypertension  Continue hydrochlorothiazide and spironolactone     COPD (chronic obstructive pulmonary disease)  Tobacco dependence  Continue PTA inhaler regimen     JANIS on CPAP     Chronic low back pain  Oxycodone to 5 mg qid prn     Urinary retention  Leger placed but did well with a voiding trial prior to discharge.   Continue PTA Flomax    Mechanical fall due to weakness: received small abrasion to right arm as a result.  Fall precautions and continue therapy at TCU.     Jude Duarte    Significant Results and Procedures   EGD 9/29    Findings:        Grade I varices were found in the lower third of the esophagus. They        were 5 mm in largest diameter.        Moderte portal gastropathy.        The cardia and gastric fundus were normal on retroflexion.        Four non-bleeding superficial duodenal ulcers were found in the duodenal        bulb and in the first portion of the duodenum. The largest lesion was 10        mm in largest dimension.        Diffuse mildly congested mucosa without active bleeding and with no        stigmata of bleeding was found in the second portion of the duodenum.                                                                                      Impression:               - Grade I esophageal varices.                             - Multiple non-bleeding duodenal ulcers.                             - Congested duodenal mucosa.                             - No specimens collected.   Recommendation:           - Return patient to hospital basilio for ongoing care.                             - Use Protonix (pantoprazole) 80 mg IV daily.     Pending Results   These results will be followed up by PCP  Unresulted Labs Ordered in the Past 30 Days of this Admission     No orders found from 7/31/2018 to 9/30/2018.          Code Status   DNR / DNI       Primary Care Physician   FERNANDA BRANTLEY    General: sitting in chair, appears comfortable  Cardiovascular: RRR  Pulmonary: CTAB  GI: +BS, soft  Lymphatics: no edema  Skin: small abrasion on right arm    Discharge Disposition   Discharged to short-term care facility  Condition at discharge: Stable    Consultations This Hospital Stay   GASTROENTEROLOGY IP CONSULT  PHYSICAL THERAPY ADULT IP CONSULT  CARE TRANSITION RN/SW IP CONSULT  PHYSICAL THERAPY ADULT IP CONSULT  OCCUPATIONAL THERAPY ADULT IP CONSULT    Time Spent on this Encounter   I, Jude Duarte, personally saw the patient today and spent less than or equal to 30 minutes discharging this patient.    Discharge Orders     General info for SNF   Length of Stay Estimate: Short Term Care: Estimated # of Days <30  Condition at Discharge: Improving  Level of care:skilled   Rehabilitation Potential: Good  Admission H&P remains valid and up-to-date: Yes  Recent Chemotherapy: N/A  Use Nursing Home Standing Orders: Yes     Mantoux instructions   Give two-step Mantoux (PPD) Per Facility Policy Yes     Follow Up and recommended labs and tests   Follow up with PCP in 1 week ++ repeat Hgb and LFTs in 5 days     Activity - Up with nursing assistance     DNR (Do Not Resuscitate)     Physical Therapy Adult  Consult   Evaluate and treat as clinically indicated.    Reason:  Weakness and deconditioning     Occupational Therapy Adult Consult   Evaluate and treat as clinically indicated.    Reason:  Weakness and deconditioning     Fall precautions     Advance Diet as Tolerated   Follow this diet upon discharge: Orders Placed This Encounter     Regular Diet Adult       Discharge Medications   Current Discharge Medication List      START taking these medications    Details   diazepam (VALIUM) 5 MG tablet Take 0.5-1 tablets (2.5-5 mg) by mouth every 8 hours as needed for anxiety  Qty: 10 tablet, Refills: 0    Associated Diagnoses: Anxiety      multivitamin, therapeutic with minerals (THERA-VIT-M) TABS tablet Take 1 tablet by mouth daily  Qty: 30 each, Refills: 0    Associated Diagnoses: Alcoholic intoxication without complication (H)      oxyCODONE IR (ROXICODONE) 5 MG tablet Take 1 tablet (5 mg) by mouth 4 times daily as needed for pain  Qty: 10 tablet, Refills: 0    Associated Diagnoses: Closed fracture of multiple ribs of right side, initial encounter         CONTINUE these medications which have NOT CHANGED    Details   albuterol (PROAIR HFA/PROVENTIL HFA/VENTOLIN HFA) 108 (90 BASE) MCG/ACT Inhaler Inhale 2 puffs into the lungs every 6 hours as needed       ATORVASTATIN CALCIUM PO Take 10 mg by mouth At Bedtime       DULoxetine (CYMBALTA) 60 MG EC capsule Take 1 capsule (60 mg) by mouth daily  Qty: 30 capsule, Refills: 0    Associated Diagnoses: 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)      fluticasone-vilanterol (BREO ELLIPTA) 200-25 MCG/INH oral inhaler Inhale 1 puff into the lungs daily as needed       hydrOXYzine (ATARAX) 25 MG tablet Take 2 tablets (50 mg) by mouth nightly as needed (sleep)  Qty: 30 tablet, Refills: 0      methocarbamol (ROBAXIN) 750 MG tablet Take 1 tablet (750 mg) by mouth 3 times daily as needed for muscle spasms  Qty: 15 tablet, Refills: 0      MIRTAZAPINE PO Take 30 mg by  mouth At Bedtime      omeprazole (PRILOSEC) 40 MG capsule Take 1 capsule (40 mg) by mouth 2 times daily (before meals) Take 30-60 minutes before a meal.  Qty: 60 capsule, Refills: 1    Associated Diagnoses: Hematemesis without nausea      QUETIAPINE FUMARATE PO Take 50 mg by mouth 3 times daily as needed      spironolactone-hydrochlorothiazide (ALDACTAZIDE) 25-25 MG per tablet Take 1 tablet by mouth daily      TAMSULOSIN HCL PO Take 0.4 mg by mouth daily      TRAZODONE HCL PO Take 100 mg by mouth nightly as needed (Takes 2 x 100mg for a total of 200mg at bedtime)           Allergies   No Known Allergies  Data   Most Recent 3 CBC's:  Recent Labs   Lab Test  10/02/18   0745  10/01/18   0730  09/30/18   0050   09/29/18   0550   WBC  4.4  4.7   --    --   6.1   HGB  8.4*  8.4*  8.3*   < >  10.1*   MCV  75*  76*   --    --   74*   PLT  104*  105*   --    --   207    < > = values in this interval not displayed.      Most Recent 3 BMP's:  Recent Labs   Lab Test  10/05/18   0750  10/04/18   0750  10/03/18   0758   10/01/18   0730  09/29/18   0550   NA   --    --   137   --   138  142   POTASSIUM  4.2  3.8  3.9   < >  3.3*  3.4   CHLORIDE   --    --   105   --   104  106   CO2   --    --   28   --   28  23   BUN   --    --   9   --   10  16   CR   --    --   0.85   --   0.80  0.79   ANIONGAP   --    --   4   --   6  13   KEV   --    --   8.5   --   8.2*  8.1*   GLC   --    --   92   --   111*  71    < > = values in this interval not displayed.     Most Recent 2 LFT's:  Recent Labs   Lab Test  10/03/18   0758  09/29/18   0550   AST  97*  140*   ALT  64  60   ALKPHOS  297*  332*   BILITOTAL  1.0  1.0     Most Recent INR's and Anticoagulation Dosing History:  Anticoagulation Dose History     Recent Dosing and Labs Latest Ref Rng & Units 3/21/2018 3/22/2018 4/26/2018 4/29/2018 4/30/2018 5/21/2018 9/29/2018    INR 0.86 - 1.14 1.31(H) 1.34(H) 1.52(H) 1.21(H) 1.13 1.08 1.10        Most Recent 3 Troponin's:  Recent Labs   Lab Test   04/26/18   1940   TROPI  <0.015     Most Recent Cholesterol Panel:  Recent Labs   Lab Test  06/12/12   0530   CHOL  198   LDL  81   HDL  95   TRIG  106     Most Recent 6 Bacteria Isolates From Any Culture (See EPIC Reports for Culture Details):  Recent Labs   Lab Test  09/29/18   1359  09/29/18   1352  05/02/18   1535  05/02/18   1530  10/08/16   1150   CULT  No growth  No growth  No growth  No growth  Moderate growth Normal trevin     Most Recent TSH, T4 and A1c Labs:  Recent Labs   Lab Test  03/22/18   0430  02/08/17   1640   TSH   --   1.51   A1C  Canceled, Test credited   --      Results for orders placed or performed during the hospital encounter of 09/29/18   CT Head w/o Contrast    Narrative    CT OF THE HEAD WITHOUT CONTRAST 9/29/2018 7:23 AM     COMPARISON: Brain MRI 4/28/2018.    HISTORY:  History of subdural hematoma, fall.     TECHNIQUE: 5 mm thick axial CT images of the head were acquired  without IV contrast material.    FINDINGS:  There is mild diffuse cerebral volume loss. There are  subtle patchy areas of decreased density in the cerebral white matter  bilaterally that are consistent with sequela of chronic small vessel  ischemic disease.     The ventricles and basal cisterns are within normal limits in  configuration given the degree of cerebral volume loss.  There is no  midline shift. There are no extra-axial fluid collections.     No intracranial hemorrhage, mass or recent infarct.    The visualized paranasal sinuses are well-aerated. There is no  mastoiditis. There are no fractures of the visualized bones.       Impression    IMPRESSION:  Diffuse cerebral volume loss and cerebral white matter  changes consistent with chronic small vessel ischemic disease. No  evidence for acute intracranial pathology. This represents interval  resolution of the right cerebral convexity subdural collection noted  on the comparison MRI.    Radiation dose for this scan was reduced using automated exposure  control,  adjustment of the mA and/or kV according to patient size, or  iterative reconstruction technique.    MARLO SANDERS MD   CT Abdomen Pelvis w Contrast    Narrative    Exam: CT ABDOMEN PELVIS W CONTRAST  9/29/2018 7:23 AM    History: Fall with pain over the RIGHT upper quadrant.    Comparison: 10/7/2016    Technique: Volumetric acquisition with reconstruction in the axial,  coronal planes through the abdomen and pelvis with contrast. Radiation  dose for this scan was reduced using automated exposure control,  adjustment of the mA and/or kV according to patient size, or iterative  reconstruction technique.    Contrast: 95 mL Isovue-370    Findings:   Lung Bases: Very mild peripheral opacities in the posterior RIGHT  lower lobe. Lung bases are otherwise clear. No pleural or pericardial  effusion.    Abdomen: Hepatic steatosis, liver otherwise appears normal. Spleen,  adrenal glands, kidneys, pancreas and gallbladder appear normal.    No areas of bowel wall thickening or bowel dilatation. No free fluid.  No abdominal or pelvic lymphadenopathy. Pelvic structures are  unremarkable. Atherosclerotic calcifications of the abdominal aorta  and its branch vessels without aneurysmal dilatation.    Bones: Mildly displaced acute fractures involving the RIGHT seventh,  eighth and ninth ribs as well as chronic fractures involving multiple  ribs on both sides. There may be additional rib fractures more  superiorly, but these are not included in this study.      Impression    Impression:   1. Mildly displaced acute fractures involving the RIGHT seventh,  eighth and ninth ribs. Additional rib fractures may be present, but  are not included in this study. Mild underlying opacities in the RIGHT  lower lobe may represent mild pulmonary contusion.  2. Hepatic steatosis.    VIVIAN SHELDON MD   Ribs XR, unilat 3 views + PA chest, right    Narrative    RIGHT RIBS AND CHEST THREE VIEWS  9/29/2018 8:01 AM     COMPARISON:  Two-view chest x-ray  3/21/2019.    HISTORY: fall rib fractures;     FINDINGS: There are nondisplaced fractures of the right fifth and  eighth ribs posteriorly and moderately displaced fractures of the  right sixth and seventh ribs posteriorly. No other rib fractures  noted. The cardiac silhouette, pulmonary vasculature, lungs and  pleural spaces are within normal limits.      Impression    IMPRESSION: Clear lungs. Right rib fractures as detailed above.    MARLO SANDERS MD

## 2018-10-05 NOTE — PLAN OF CARE
Problem: Patient Care Overview  Goal: Plan of Care/Patient Progress Review  Outcome: No Change  Disoriented to situation and place. VSS on RA. CIWA was 4, 6, and 15 over night. PRN valium given per orders. Tremors, very unsteady when ambulating. Ax1-2 with walker and gait belt. Alarm on in bed and when in chair. Pain in R rib cage managed with PRN oxycodone, robaxin, and tylenol. Abdominal binder on. Reg diet. Voiding in urinal or bathroom. Passing gas. Possible discharge to TCU today.

## 2018-10-05 NOTE — PLAN OF CARE
Problem: Patient Care Overview  Goal: Plan of Care/Patient Progress Review  Outcome: No Change  A&Ox3.  VSS on room air. Oxycodone and acetaminophen administered for pain. Pt disoriented to situation. Pt has tremors.  CIWA 6 and 4.  No interventions required per order parameters.  Bowel sounds hypoactive.   Pt has +1 dependent edema.  Pt on regular diet, tolerating well.  Hydroxyzine administered per pt request. Pt up with A1, walker, and gaitbelt.  Pt fell this shift.   Pt has laceration on right arm as a results of fall.  Dressing is c/d/i.  House NP notified/on call notified.  Plan of care: continue to monitor overnight.

## 2018-10-05 NOTE — PROGRESS NOTES
DANIELE Discharge Note:    D/I:  Received discharge orders for patient to discharge to [facility] for today.  DANIELE updated facility and faxed orders via DOD.  DANIELE called [facility] to order a wheelchair transport for patient.  Ride is set up for 8:15 tonight.  Updated patient and is in agreement.     P:  Discharge to [facility] today.     Brad Clifford, UJLIA, LGSW

## 2018-10-05 NOTE — PLAN OF CARE
Problem: Patient Care Overview  Goal: Plan of Care/Patient Progress Review  Discharge Planner PT   Patient plan for discharge: TCU  Current status: Pt requires Sarah for bed mobility, poor follow through of cues for log roll technique for improved comfort d/t reported back pain. Pt requires CGA for sit<>stand to FWW, cues for walker safety. Pt ambulated 200' with FWW and CGA, needs cues for posture and walker safety.  Barriers to return to prior living situation: Fall risk, current level of assist, decreased balance  Recommendations for discharge: TCU  Rationale for recommendations: Pt would benefit from continued skilled PT services to progress functional strength, activity tolerance, balance, safety and IND with mobility prior to return home.       Entered by: Yamile Diggs 10/05/2018 4:30 PM

## 2018-10-05 NOTE — PROVIDER NOTIFICATION
MD Notification    Notified Person: MD    Notified Person Name: Felicia    Notification Date/Time: 10/5/18 9:45 a.m.    Notification Interaction: FYI page we have a w/c ride for 13:00 asking for new d    Purpose of Notification:    Orders Received:    Comments:

## 2018-10-06 NOTE — PROGRESS NOTES
Pt discharged to facility by [facility] transportation. Belongings with patient. Dines pain at times of discharge.

## 2018-10-06 NOTE — PLAN OF CARE
Problem: Patient Care Overview  Goal: Plan of Care/Patient Progress Review  Physical Therapy Discharge Summary    Reason for therapy discharge:    Discharged to transitional care facility.    Progress towards therapy goal(s). See goals on Care Plan in [facility] electronic health record for goal details.  Goals not met.  Barriers to achieving goals:   discharge from facility.    Therapy recommendation(s):    Continued therapy is recommended.  Rationale/Recommendations:  Patient would benefit from continued skilled therapy to further improve strength, balance, and independence with mobility and ambulation prior to safe discharge home. .

## 2018-11-20 ENCOUNTER — HOSPITAL ENCOUNTER (EMERGENCY)
Facility: CLINIC | Age: 63
Discharge: HOME | End: 2018-11-21
Attending: EMERGENCY MEDICINE | Admitting: EMERGENCY MEDICINE
Payer: MEDICARE

## 2018-11-20 DIAGNOSIS — F32.A DEPRESSION, UNSPECIFIED DEPRESSION TYPE: ICD-10-CM

## 2018-11-20 DIAGNOSIS — F10.10 ALCOHOL ABUSE: ICD-10-CM

## 2018-11-20 PROCEDURE — 99285 EMERGENCY DEPT VISIT HI MDM: CPT | Mod: 25

## 2018-11-20 PROCEDURE — 90791 PSYCH DIAGNOSTIC EVALUATION: CPT

## 2018-11-20 PROCEDURE — 82075 ASSAY OF BREATH ETHANOL: CPT

## 2018-11-20 NOTE — ED AVS SNAPSHOT
Emergency Department    [address] [facility] [facility] [address]    Phone:  [phone]    Fax:  [phone]                                       Jim Richard   MRN: [id number]    Department:   Emergency Department   Date of Visit:  11/20/2018           After Visit Summary Signature Page     I have received my discharge instructions, and my questions have been answered. I have discussed any challenges I see with this plan with the nurse or doctor.    ..........................................................................................................................................  Patient/Patient Representative Signature      ..........................................................................................................................................  Patient Representative Print Name and Relationship to Patient    ..................................................               ................................................  Date                                   Time    ..........................................................................................................................................  Reviewed by Signature/Title    ...................................................              ..............................................  Date                                               Time          22EPIC Rev 08/18

## 2018-11-20 NOTE — ED AVS SNAPSHOT
"  Emergency Department    [address] [facility] [address]    Phone:  [phone]    Fax:  [phone]                                       Jim Richard   MRN: [id number]    Department:   Emergency Department   Date of Visit:  11/20/2018           Patient Information     Date Of Birth          1955        Your diagnoses for this visit were:     Alcohol abuse     Depression, unspecified depression type        You were seen by Sebastien Gasca MD.      Follow-up Information     Call Talon Elias MD.    Specialty:  Family Practice    Contact information:    [facility]  [address]  [place] [address]  [phone]          Follow up with  Emergency Department.    Specialty:  EMERGENCY MEDICINE    Why:  As needed, If symptoms worsen    Contact information:    [address] [facility] [id number] [phone]        Discharge Instructions         Alcohol Abuse  Alcoholic drinks are harmful when you have too many of them. There is no set number of drinks that defines too much. Drinking that disrupts your life or your health is called alcohol abuse. Alcohol abuse can hurt your relationships with others. You may lose friends, a spouse, or even your job. You may be abusing alcohol if any of the following are true for you:    Duties at home or with  suffer because of drinking.    Duties at work or in school suffer because of drinking.    You have missed work or school because of drinking.    You use alcohol while driving or operating machinery.    You have legal problems such as arrests due to drinking.    You keep drinking even though it causes serious problems in your life.  Health effects  Alcohol abuse causes health problems. Sometimes this can happen after only drinking a  little.\" There is no set number of drinks or amount of alcohol that defines too much. The more you drink at one time, and the more often you drink determine " both the short-term and long-term health effects. It affects all parts of your body and your health, including your:    Brain. Alcohol is a central nervous system depressant. It can damage parts of the brain that affect your balance, memory, thinking, and emotions. It can cause memory loss, blackouts, depression, agitation, sleep cycle changes, and seizures. These changes may or may not be reversible.    Heart and vascular system. Alcohol affects multiple areas. It can damage heart muscle causing cardiomyopathy, which is a weakening and stretching of the heart muscle. This can lead to trouble breathing, an irregular heartbeat, atrial fibrillation, leg swelling, and heart failure. Alcohol use makes the blood vessels stiffen causing high blood pressure. All of these problems increase your risk of having heart attacks or strokes.    Liver. Alcohol causes fat to build up in the liver, affecting its normal function. This increases the risk for hepatitis, leading to abdominal pain, appetite loss, jaundice, bleeding problems, liver fibrosis, and cirrhosis. This, in turn, can affect your ability to fight off infections, and can cause diabetes. The liver changes prevent it from removing toxins in your blood that can cause encephalopathy, which may show with confusion, altered level of consciousness, personality changes, memory loss, seizures, coma, and death.    Pancreas. Alcohol can cause inflammation of the pancreas, or pancreatitis. This can cause abdominal pain, fever, and diabetes.    Immune system. Alcohol weakens your immune system in a number of ways. It suppresses your immune system making it harder to fight infections and colds. It also increases the chance of getting pneumonia and tuberculosis.    Cancer. Alcohol is a risk factor for developing cancer of the mouth, esophagus, pharynx, larynx, liver, and breast.    Sexual function. Alcohol can lead to sexual problems.  Home care  The following guidelines will help  you deal with alcohol abuse:    Admit you have a problem with alcohol.    Ask for help from your healthcare provider and trusted family members or close friends.    Get help from people trained in dealing with alcohol abuse. This may be individual counseling or group therapy, or it may be a supervised alcohol treatment program.    Join a self-help group for alcohol abuse such as Alcoholics Anonymous (AA).    Stay away from people who abuse alcohol or tempt you to drink.  Follow-up care  Follow up with your healthcare provider, or as advised. Contact these groups to get help:    Alcoholics Anonymous (AA): Go to www.aa.org or check the phone book for meetings near you.    National Alcohol and Substance Abuse Information Center (NASAIC): 867.452.4051, www.addictioncareInternational Isotopes.[employer]    National [place] on Alcoholism and Drug Dependence (NCADD): 347-CGV-YLEC (142-834-4071), www.ncadd.org  Call 911  Call 911 if any of these occur:    Trouble breathing or slow irregular breathing    Chest pain    Sudden weakness on one side of your body or sudden trouble speaking    Heavy bleeding or vomiting blood    Very drowsy or trouble awakening    Fainting or loss of consciousness    Rapid heart rate    Seizure  When to seek medical care  Call your healthcare provider right away if any of these occur:     Confusion    Hallucinations (seeing, hearing, or feeling things that aren t there)    Pain in your upper abdomen that gets worse    Repeated vomiting or black or tarry stools    Severe shakiness  Date Last Reviewed: 6/1/2016 2000-2018 [employer]. [address] [place] [address]. All rights reserved. This information is not intended as a substitute for professional medical care. Always follow your healthcare professional's instructions.        Recognizing Suicide Warning Signs in Yourself    People who are thinking about suicide may not know they are depressed. Certain thoughts, feelings, and actions can be  "signals that let you know you may need help. The best thing you can do is watch for signs that you may be at risk. Then, ask for help. You can talk to your regular healthcare provider or seek help from a mental health provider.  Depression  Depression is a treatable illness. To know if depression is causing you to feel like ending your life, ask yourself:    Do I feel worthless, guilty, helpless, or hopeless?    Have I been feeling sad, down, or blue on most days?    Have I lost interest in my work or people I used to enjoy?    Do I have trouble sleeping or do I sleep too much?    Do I eat more or less than usual?    Do I feel tired, weak, and low on energy?    Do I feel restless and unable to sit still?    Do I have trouble thinking or making choices?    Do I cry more than usual?    Do I feel life isn't worth living?  Warning signs for suicide    Thinking often about taking your life    Planning how you may attempt it    Talking or writing about committing suicide    Feeling that death is the only solution to your problems    Feeling a pressing need to make out your will or arrange your     Giving away things you own    Participating in risky behaviors, such as sex with someone you don't know or drinking and driving    Buying a lethal weapon, such as a gun, or hoarding medicines that could be used in an over dose  If you notice any of these warning signs, call for help right away or go to your closest hospital emergency department. You can also call a mental health clinic or a 24-hour suicide crisis hotline for help and support. Search for local suicide prevention resources on your computer or look for the number in the white pages of your phone book under \"Suicide.\" In an emergency, if you are in immediate risk of harming yourself, call 911. For more information about depression:    National [place] of Mental Qcytnr652-520-2696llz.[facility] Hospital[facility].nih.gov    National Suicide Prevention Fzsrxyju885-748-6554 " (706-589-PNHW)www.suicidepreventionlifeline.org    National [place] on Mental Cvmnxfr243-093-3697dza.debra.org    Mental Health Cndupdj456-267-8711vwh.[facility].org    National Suicide Tqcbexn901-830-0309 (469-SUICIDE)   Date Last Reviewed: 1/1/2017 2000-2018 [employer]. [address] [place] [address]. All rights reserved. This information is not intended as a substitute for professional medical care. Always follow your healthcare professional's instructions.            24 Hour Appointment Hotline       To make an appointment at any [facility], call [phone] ([phone]). If you don't have a family doctor or clinic, we will help you find one. [facility] clinics are conveniently located to serve the needs of you and your family.             Review of your medicines      Our records show that you are taking the medicines listed below. If these are incorrect, please call your family doctor or clinic.        Dose / Directions Last dose taken    albuterol 108 (90 Base) MCG/ACT inhaler   Commonly known as:  PROAIR HFA/PROVENTIL HFA/VENTOLIN HFA   Dose:  2 puff        Inhale 2 puffs into the lungs every 6 hours as needed   Refills:  0        ATORVASTATIN CALCIUM PO   Dose:  10 mg        Take 10 mg by mouth At Bedtime   Refills:  0        BREO ELLIPTA 200-25 MCG/INH inhaler   Dose:  1 puff   Generic drug:  fluticasone-vilanterol        Inhale 1 puff into the lungs daily as needed   Refills:  0        diazepam 5 MG tablet   Commonly known as:  VALIUM   Dose:  2.5-5 mg   Quantity:  10 tablet        Take 0.5-1 tablets (2.5-5 mg) by mouth every 8 hours as needed for anxiety   Refills:  0        DULoxetine 60 MG EC capsule   Commonly known as:  CYMBALTA   Dose:  60 mg   Quantity:  30 capsule        Take 1 capsule (60 mg) by mouth daily   Refills:  0        hydrOXYzine 25 MG tablet   Commonly known as:  ATARAX   Dose:  50 mg   Quantity:  30 tablet        Take 2 tablets (50 mg) by mouth nightly  as needed (sleep)   Refills:  0        methocarbamol 750 MG tablet   Commonly known as:  ROBAXIN   Dose:  750 mg   Quantity:  15 tablet        Take 1 tablet (750 mg) by mouth 3 times daily as needed for muscle spasms   Refills:  0        MIRTAZAPINE PO   Dose:  30 mg        Take 30 mg by mouth At Bedtime   Refills:  0        multivitamin, therapeutic with minerals Tabs tablet   Dose:  1 tablet   Quantity:  30 each        Take 1 tablet by mouth daily   Refills:  0        omeprazole 40 MG capsule   Commonly known as:  priLOSEC   Dose:  40 mg   Quantity:  60 capsule        Take 1 capsule (40 mg) by mouth 2 times daily (before meals) Take 30-60 minutes before a meal.   Refills:  1        oxyCODONE IR 5 MG tablet   Commonly known as:  ROXICODONE   Dose:  5 mg   Quantity:  10 tablet        Take 1 tablet (5 mg) by mouth 4 times daily as needed for pain   Refills:  0        QUETIAPINE FUMARATE PO   Dose:  50 mg        Take 50 mg by mouth 3 times daily as needed   Refills:  0        spironolactone-hydrochlorothiazide 25-25 MG per tablet   Commonly known as:  ALDACTAZIDE   Dose:  1 tablet        Take 1 tablet by mouth daily   Refills:  0        TAMSULOSIN HCL PO   Dose:  0.4 mg        Take 0.4 mg by mouth daily   Refills:  0        TRAZODONE HCL PO   Dose:  100 mg        Take 100 mg by mouth nightly as needed (Takes 2 x 100mg for a total of 200mg at bedtime)   Refills:  0                Procedures and tests performed during your visit     Alcohol breath test POCT      Orders Needing Specimen Collection     None      Pending Results     No orders found for last 3 day(s).            Pending Culture Results     No orders found for last 3 day(s).            Pending Results Instructions     If you had any lab results that were not finalized at the time of your Discharge, you can call the ED Lab Result RN at [phone]. You will be contacted by this team for any positive Lab results or changes in treatment. The nurses are  available 7 days a week from 10A to 6:30P.  You can leave a message 24 hours per day and they will return your call.        Test Results From Your Hospital Stay        11/21/2018 12:17 AM      Component Results     Component Value Ref Range & Units Status    Alcohol Breath Test 0.319 (A) 0.00 - 0.01 Final                Clinical Quality Measure: Blood Pressure Screening     Your blood pressure was checked while you were in the emergency department today. The last reading we obtained was  BP: 127/68 . Please read the guidelines below about what these numbers mean and what you should do about them.  If your systolic blood pressure (the top number) is less than 120 and your diastolic blood pressure (the bottom number) is less than 80, then your blood pressure is normal. There is nothing more that you need to do about it.  If your systolic blood pressure (the top number) is 120-139 or your diastolic blood pressure (the bottom number) is 80-89, your blood pressure may be higher than it should be. You should have your blood pressure rechecked within a year by a primary care provider.  If your systolic blood pressure (the top number) is 140 or greater or your diastolic blood pressure (the bottom number) is 90 or greater, you may have high blood pressure. High blood pressure is treatable, but if left untreated over time it can put you at risk for heart attack, stroke, or kidney failure. You should have your blood pressure rechecked by a primary care provider within the next 4 weeks.  If your provider in the emergency department today gave you specific instructions to follow-up with your doctor or provider even sooner than that, you should follow that instruction and not wait for up to 4 weeks for your follow-up visit.        Thank you for choosing [facility]       Thank you for choosing [facility] for your care. Our goal is always to provide you with excellent care. Hearing back from our patients is one way we can continue to  "improve our services. Please take a few minutes to complete the written survey that you may receive in the mail after you visit with us. Thank you!        [employer] Information     [employer] lets you send messages to your doctor, view your test results, renew your prescriptions, schedule appointments and more. To sign up, go to [email or web address] . Click on \"Log in\" on the left side of the screen, which will take you to the Welcome page. Then click on \"Sign up Now\" on the right side of the page.     You will be asked to enter the access code listed below, as well as some personal information. Please follow the directions to create your username and password.     Your access code is: [email or web address]  Expires: 2019  3:56 PM     Your access code will  in 90 days. If you need help or a new code, please call your [place] clinic or [phone].        Care EveryWhere ID     This is your Care EveryWhere ID. This could be used by other organizations to access your [place] medical records  [id number]        Equal Access to Services     [facility]: Hadcharleen Escobar, wasamantha worthy, qadidi lopez, stan azul. So [facility] [phone].    ATENCIÓN: Si habla español, tiene a thompson disposición servicios gratuitos de asistencia lingüística. Jeremías al [phone].    We comply with applicable federal civil rights laws and [place] laws. We do not discriminate on the basis of race, color, national origin, age, disability, sex, sexual orientation, or gender identity.            After Visit Summary       This is your record. Keep this with you and show to your community pharmacist(s) and doctor(s) at your next visit.                  "

## 2018-11-21 VITALS — DIASTOLIC BLOOD PRESSURE: 68 MMHG | TEMPERATURE: 97.4 F | OXYGEN SATURATION: 97 % | SYSTOLIC BLOOD PRESSURE: 127 MMHG

## 2018-11-21 LAB — ALCOHOL BREATH TEST: 0.32 (ref 0–0.01)

## 2018-11-21 PROCEDURE — 25000132 ZZH RX MED GY IP 250 OP 250 PS 637: Performed by: EMERGENCY MEDICINE

## 2018-11-21 PROCEDURE — A9270 NON-COVERED ITEM OR SERVICE: HCPCS | Performed by: EMERGENCY MEDICINE

## 2018-11-21 RX ORDER — ACETAMINOPHEN 325 MG/1
650 TABLET ORAL ONCE
Status: COMPLETED | OUTPATIENT
Start: 2018-11-21 | End: 2018-11-21

## 2018-11-21 RX ADMIN — ACETAMINOPHEN 650 MG: 325 TABLET ORAL at 04:46

## 2018-11-21 ASSESSMENT — ENCOUNTER SYMPTOMS
ABDOMINAL PAIN: 0
DYSPHORIC MOOD: 1
SHORTNESS OF BREATH: 0
FEVER: 0

## 2018-11-21 NOTE — ED PROVIDER NOTES
"  History     Chief Complaint:  Suicidal comments and Alcohol Intoxication    HPI   Jim Richard is a 63 year old male with a complex medical history pertinent for anxiety, depression, MI, COPD, and alcoholism who presents to the emergency department today via EMS for evaluation of suicidal comments, depression, and alcohol intoxication.  The patient admits to drinking about 1 pint of vodka daily.  Per EMS, the patient drank his daily 1 pint of vodka and then started to feel increasingly depressed.  He texted his brother and roommate that he wanted to kill himself and texted them \"goodbye.\" He does typically get sad around the holiday season as he cannot see his children anymore. This prompted concern from the family causing the brother to call EMS and prompting the patient's arrival to the emergency department.  On arrival, the patient was breathalyzed at 0.319.  He denies active suicidal ideation, homicidal ideation, and street drug use.  Of note, the patient does see a new psychiatrist next week and states that he does take all of his antidepressant medications.  Additionally, the patient does live with his roommate.    Allergies:  No Known Drug Allergies    Medications:    albuterol (PROAIR HFA/PROVENTIL HFA/VENTOLIN HFA) 108 (90 BASE) MCG/ACT Inhaler  ATORVASTATIN CALCIUM PO  diazepam (VALIUM) 5 MG tablet  DULoxetine (CYMBALTA) 60 MG EC capsule  fluticasone-vilanterol (BREO ELLIPTA) 200-25 MCG/INH oral inhaler  hydrOXYzine (ATARAX) 25 MG tablet  methocarbamol (ROBAXIN) 750 MG tablet  MIRTAZAPINE PO  multivitamin, therapeutic with minerals (THERA-VIT-M) TABS tablet  omeprazole (PRILOSEC) 40 MG capsule  QUETIAPINE FUMARATE PO  spironolactone-hydrochlorothiazide (ALDACTAZIDE) 25-25 MG per tablet  TAMSULOSIN HCL PO  TRAZODONE HCL PO    Past Medical History:    Alcoholism   Anxiety  CAD  Depression  Esophageal varices with bleeding  Heart attack  Hepatomegaly  Hyperlipidemia  Hypertension  JANIS on " CPAP  Peripheral vascular disease  SDH (subdural hematoma)  Spinal stenosis  COPD  GI bleed  Chronic back pain    Past Surgical History:    Appendectomy  Bypass graft femoropopliteal  Colonoscopy  Endarectomy femoral  EGD x2  Hernia repair  Laminectomy, fusion lumbar  Tonsillectomy and adenoidectomy    Family History:    Mental illness  CAD  Substance abuse  Lung cancer    Social History:  The patient was alone.  Smoking Status: Current Every Day Smoker, 1.00 ppd  Smokeless Tobacco: Never  Alcohol Use: Yes, 3/4 pint vodka daily  Marital Status:      Review of Systems   Constitutional: Negative for fever.   Respiratory: Negative for shortness of breath.    Cardiovascular: Negative for chest pain.   Gastrointestinal: Negative for abdominal pain.   Psychiatric/Behavioral: Positive for dysphoric mood and suicidal ideas. Negative for self-injury.   All other systems reviewed and are negative.    Physical Exam     Patient Vitals for the past 24 hrs:   BP Temp Temp src Heart Rate SpO2   11/21/18 0230 127/68 - - - -   11/20/18 2359 - 97.4  F (36.3  C) Oral - -   11/20/18 2354 141/83 - - 82 97 %         Physical Exam  General: Appears intoxicated, but alert and conversant.  Head: No signs of trauma.   CV: Normal rate and regular rhythm.    Resp: Effort normal and breath sounds normal. No respiratory distress.   GI: Soft. There is no tenderness.  No rebound or guarding.  Normal bowel sounds.    MSK: Normal range of motion. no edema. No Calf tenderness.  Neuro: The patient is alert and oriented to person, place, and time.  Strength in upper/lower extremities normal and symmetrical.   Sensation normal. Speech normal.  Ambulatory  GCS 15  Skin: Skin is warm and dry. No rash noted.   Psych: normal mood and affect. behavior is normal.       Emergency Department Course   Laboratory:  Laboratory findings were communicated with the patient who voiced understanding of the findings.  Alcohol breath test POCT:  0.319    Interventions:  0446 Tylenol 650 mg PO    Emergency Department Course:  Nursing notes and vitals reviewed.  0010: I performed an exam of the patient as documented above.   0314: Patient rechecked and updated.   0426: I spoke with DEC regarding patient's presentation, findings, and plan of care.  0444:  I spoke with DEC regarding patient's presentation, findings, and plan of care.  Findings and plan explained to the Patient. Patient discharged home with instructions regarding supportive care, medications, and reasons to return. The importance of close follow-up was reviewed.   I personally reviewed the laboratory results with the Patient and answered all related questions prior to discharge.    Impression & Plan    Medical Decision Making:  Jim Richard is a 63-year-old gentleman who presents due to alcohol intoxication and family concern for self-harm.  Apparently he had sent some text messages saying goodbye which made his brother concerned and called EMS.  Patient did admit to drinking alcohol and his alcohol level was elevated on arrival.  Patient denied any thoughts of self-harm stated while he did send a text messages, he had no intention of harming himself.  Patient was monitored for a number of hours to allow him to metabolize the alcohol.  On further repeat evaluation, he was fully alert and oriented and ambulatory without difficulty.  He continued to deny any thoughts of self-harm.  I did have DEC speak with the patient and were both in agreement that he does not appear to represent an immediate threat to himself.  He has an appointment to see his psychiatrist in 1 week.  He is recommended to continue with AA for his alcohol use and to return to the ER for any new or worsening symptoms or further concerns.    Diagnosis:    ICD-10-CM    1. Alcohol abuse F10.10    2. Depression, unspecified depression type F32.9        Disposition:  discharged to home    Scribe Disclosure:  Yao VALENTE  am serving as a scribe at 12:07 AM on 11/21/2018 to document services personally performed by Sebastien Gasca MD based on my observations and the provider's statements to me.     11/20/2018    EMERGENCY DEPARTMENT       Sebastien Gasca MD  11/21/18 1137

## 2018-11-21 NOTE — DISCHARGE INSTRUCTIONS
"  Alcohol Abuse  Alcoholic drinks are harmful when you have too many of them. There is no set number of drinks that defines too much. Drinking that disrupts your life or your health is called alcohol abuse. Alcohol abuse can hurt your relationships with others. You may lose friends, a spouse, or even your job. You may be abusing alcohol if any of the following are true for you:    Duties at home or with  suffer because of drinking.    Duties at work or in school suffer because of drinking.    You have missed work or school because of drinking.    You use alcohol while driving or operating machinery.    You have legal problems such as arrests due to drinking.    You keep drinking even though it causes serious problems in your life.  Health effects  Alcohol abuse causes health problems. Sometimes this can happen after only drinking a  little.\" There is no set number of drinks or amount of alcohol that defines too much. The more you drink at one time, and the more often you drink determine both the short-term and long-term health effects. It affects all parts of your body and your health, including your:    Brain. Alcohol is a central nervous system depressant. It can damage parts of the brain that affect your balance, memory, thinking, and emotions. It can cause memory loss, blackouts, depression, agitation, sleep cycle changes, and seizures. These changes may or may not be reversible.    Heart and vascular system. Alcohol affects multiple areas. It can damage heart muscle causing cardiomyopathy, which is a weakening and stretching of the heart muscle. This can lead to trouble breathing, an irregular heartbeat, atrial fibrillation, leg swelling, and heart failure. Alcohol use makes the blood vessels stiffen causing high blood pressure. All of these problems increase your risk of having heart attacks or strokes.    Liver. Alcohol causes fat to build up in the liver, affecting its normal function. This " increases the risk for hepatitis, leading to abdominal pain, appetite loss, jaundice, bleeding problems, liver fibrosis, and cirrhosis. This, in turn, can affect your ability to fight off infections, and can cause diabetes. The liver changes prevent it from removing toxins in your blood that can cause encephalopathy, which may show with confusion, altered level of consciousness, personality changes, memory loss, seizures, coma, and death.    Pancreas. Alcohol can cause inflammation of the pancreas, or pancreatitis. This can cause abdominal pain, fever, and diabetes.    Immune system. Alcohol weakens your immune system in a number of ways. It suppresses your immune system making it harder to fight infections and colds. It also increases the chance of getting pneumonia and tuberculosis.    Cancer. Alcohol is a risk factor for developing cancer of the mouth, esophagus, pharynx, larynx, liver, and breast.    Sexual function. Alcohol can lead to sexual problems.  Home care  The following guidelines will help you deal with alcohol abuse:    Admit you have a problem with alcohol.    Ask for help from your healthcare provider and trusted family members or close friends.    Get help from people trained in dealing with alcohol abuse. This may be individual counseling or group therapy, or it may be a supervised alcohol treatment program.    Join a self-help group for alcohol abuse such as Alcoholics Anonymous (AA).    Stay away from people who abuse alcohol or tempt you to drink.  Follow-up care  Follow up with your healthcare provider, or as advised. Contact these groups to get help:    Alcoholics Anonymous (AA): Go to www.aa.org or check the phone book for meetings near you.    National Alcohol and Substance Abuse Information Center (NASAIC): 660.727.7031, www.addictioncareoptions.com    National [place] on Alcoholism and Drug Dependence (NCADD): 813-PNY-SCJC (106-128-8643), www.ncadd.org  Call 911  Call 911 if any of these  occur:    Trouble breathing or slow irregular breathing    Chest pain    Sudden weakness on one side of your body or sudden trouble speaking    Heavy bleeding or vomiting blood    Very drowsy or trouble awakening    Fainting or loss of consciousness    Rapid heart rate    Seizure  When to seek medical care  Call your healthcare provider right away if any of these occur:     Confusion    Hallucinations (seeing, hearing, or feeling things that aren t there)    Pain in your upper abdomen that gets worse    Repeated vomiting or black or tarry stools    Severe shakiness  Date Last Reviewed: 6/1/2016 2000-2018 [employer]. [address] [place] [address]. All rights reserved. This information is not intended as a substitute for professional medical care. Always follow your healthcare professional's instructions.        Recognizing Suicide Warning Signs in Yourself    People who are thinking about suicide may not know they are depressed. Certain thoughts, feelings, and actions can be signals that let you know you may need help. The best thing you can do is watch for signs that you may be at risk. Then, ask for help. You can talk to your regular healthcare provider or seek help from a mental health provider.  Depression  Depression is a treatable illness. To know if depression is causing you to feel like ending your life, ask yourself:    Do I feel worthless, guilty, helpless, or hopeless?    Have I been feeling sad, down, or blue on most days?    Have I lost interest in my work or people I used to enjoy?    Do I have trouble sleeping or do I sleep too much?    Do I eat more or less than usual?    Do I feel tired, weak, and low on energy?    Do I feel restless and unable to sit still?    Do I have trouble thinking or making choices?    Do I cry more than usual?    Do I feel life isn't worth living?  Warning signs for suicide    Thinking often about taking your life    Planning how you may attempt  "it    Talking or writing about committing suicide    Feeling that death is the only solution to your problems    Feeling a pressing need to make out your will or arrange your     Giving away things you own    Participating in risky behaviors, such as sex with someone you don't know or drinking and driving    Buying a lethal weapon, such as a gun, or hoarding medicines that could be used in an over dose  If you notice any of these warning signs, call for help right away or go to your closest hospital emergency department. You can also call a mental health clinic or a 24-hour suicide crisis hotline for help and support. Search for local suicide prevention resources on your computer or look for the number in the white pages of your phone book under \"Suicide.\" In an emergency, if you are in immediate risk of harming yourself, call 911. For more information about depression:    National [place] of Mental Mzscja849-304-4323xte.[facility].nih.gov    National Suicide Prevention Ybgczfyx808-747-3138 (331-327-SHCB)www.suicidepreventionlifeline.org    National [place] on Mental Gupoams530-492-0731mqu.debra.org    Mental Health Dhmqays656-572-8052nal.[facility].org    National Suicide Jgkboan522-259-6292 (800-SUICIDE)   Date Last Reviewed: 2017-2018 [employer]. [address] [place] [address]. All rights reserved. This information is not intended as a substitute for professional medical care. Always follow your healthcare professional's instructions.          "

## 2018-12-05 ENCOUNTER — HOSPITAL ENCOUNTER (EMERGENCY)
Facility: CLINIC | Age: 63
Discharge: HOME | End: 2018-12-06
Attending: EMERGENCY MEDICINE | Admitting: EMERGENCY MEDICINE
Payer: MEDICARE

## 2018-12-05 DIAGNOSIS — J06.9 UPPER RESPIRATORY TRACT INFECTION, UNSPECIFIED TYPE: ICD-10-CM

## 2018-12-05 DIAGNOSIS — R45.851 SUICIDAL THOUGHTS: ICD-10-CM

## 2018-12-05 DIAGNOSIS — R09.81 SINUS CONGESTION: ICD-10-CM

## 2018-12-05 DIAGNOSIS — F10.220 ACUTE ALCOHOLIC INTOXICATION IN ALCOHOLISM WITHOUT COMPLICATION (H): ICD-10-CM

## 2018-12-05 LAB — GLUCOSE BLDC GLUCOMTR-MCNC: 75 MG/DL (ref 70–99)

## 2018-12-05 PROCEDURE — 99285 EMERGENCY DEPT VISIT HI MDM: CPT | Mod: 25

## 2018-12-05 PROCEDURE — 82075 ASSAY OF BREATH ETHANOL: CPT

## 2018-12-05 PROCEDURE — 90791 PSYCH DIAGNOSTIC EVALUATION: CPT

## 2018-12-05 PROCEDURE — A9270 NON-COVERED ITEM OR SERVICE: HCPCS | Mod: GY | Performed by: EMERGENCY MEDICINE

## 2018-12-05 PROCEDURE — 80320 DRUG SCREEN QUANTALCOHOLS: CPT | Performed by: EMERGENCY MEDICINE

## 2018-12-05 PROCEDURE — 00000146 ZZHCL STATISTIC GLUCOSE BY METER IP

## 2018-12-05 PROCEDURE — 25000132 ZZH RX MED GY IP 250 OP 250 PS 637: Mod: GY | Performed by: EMERGENCY MEDICINE

## 2018-12-05 RX ORDER — HYDROXYZINE HYDROCHLORIDE 25 MG/1
25 TABLET, FILM COATED ORAL ONCE
Status: COMPLETED | OUTPATIENT
Start: 2018-12-05 | End: 2018-12-05

## 2018-12-05 RX ADMIN — HYDROXYZINE HYDROCHLORIDE 25 MG: 25 TABLET ORAL at 23:51

## 2018-12-05 ASSESSMENT — ENCOUNTER SYMPTOMS: SLEEP DISTURBANCE: 1

## 2018-12-05 NOTE — ED AVS SNAPSHOT
Emergency Department    [address] [facility] [address]    Phone:  [phone]    Fax:  [phone]                                       Jim Richard   MRN: [id number]    Department:   Emergency Department   Date of Visit:  12/5/2018           After Visit Summary Signature Page     I have received my discharge instructions, and my questions have been answered. I have discussed any challenges I see with this plan with the nurse or doctor.    ..........................................................................................................................................  Patient/Patient Representative Signature      ..........................................................................................................................................  Patient Representative Print Name and Relationship to Patient    ..................................................               ................................................  Date                                   Time    ..........................................................................................................................................  Reviewed by Signature/Title    ...................................................              ..............................................  Date                                               Time          22EPIC Rev 08/18

## 2018-12-05 NOTE — ED AVS SNAPSHOT
Emergency Department    [address] [facility] [address]    Phone:  [phone]    Fax:  [phone]                                       Jim Richard   MRN: [id number]    Department:   Emergency Department   Date of Visit:  12/5/2018           Patient Information     Date Of Birth          1955        Your diagnoses for this visit were:     Acute alcoholic intoxication in alcoholism without complication (H)     Suicidal thoughts     Upper respiratory tract infection, unspecified type     Sinus congestion        You were seen by Cassie Baca MD and Osorio Cifuentes MD.      Follow-up Information     Follow up with Talon Elias MD In 3 days.    Specialty:  Family Practice    Contact information:    [address]AMPBELL  [address] [facility]  [place] [address] [phone]          Follow up with  Emergency Department.    Specialty:  EMERGENCY MEDICINE    Why:  As needed    Contact information:    [address] [facility] [address] [phone]        Discharge Instructions       Over the counter Mucinex DM as needed for congestion.   Follow up with primary care.  Seek help for your drinking.  Return with thoughts of harming yourself.    *[facility] [employer] CD Intake  (type CD intake in the search field)  www.[employer].org  [phone]   inpatient services [phone]  or [phone] To arrange an appointment with CD counselor   [facility]  [email or web address]  [phone] Hours vary, call for information  Rule 25 assessments  Counseling & assessments  For CD & outpatient treatment   [place]  [facility] ([facility])  http://mntc.org   [phone] [address], [facility]  Residential drug and alcohol programs serving teens and adults from all ethnic, socioeconomic and Synagogue backgrounds       AA                                     [phone]                        [place] and [facility]  site  [email or web address]      Discharge References/Attachments     ALCOHOL INTOXICATION (ENGLISH)    GETTING HELP, ALCOHOLISM (ENGLISH)    URI, VIRAL, NO ABX (ADULT) (ENGLISH)      24 Hour Appointment Hotline       To make an appointment at any [facility], call [phone] ([phone]). If you don't have a family doctor or clinic, we will help you find one. [facility] clinics are conveniently located to serve the needs of you and your family.             Review of your medicines      Our records show that you are taking the medicines listed below. If these are incorrect, please call your family doctor or clinic.        Dose / Directions Last dose taken    albuterol 108 (90 Base) MCG/ACT inhaler   Commonly known as:  PROAIR HFA/PROVENTIL HFA/VENTOLIN HFA   Dose:  2 puff        Inhale 2 puffs into the lungs every 6 hours as needed   Refills:  0        ATORVASTATIN CALCIUM PO   Dose:  10 mg        Take 10 mg by mouth At Bedtime   Refills:  0        BREO ELLIPTA 200-25 MCG/INH inhaler   Dose:  1 puff   Generic drug:  fluticasone-vilanterol        Inhale 1 puff into the lungs daily as needed   Refills:  0        diazepam 5 MG tablet   Commonly known as:  VALIUM   Dose:  2.5-5 mg   Quantity:  10 tablet        Take 0.5-1 tablets (2.5-5 mg) by mouth every 8 hours as needed for anxiety   Refills:  0        DULoxetine 60 MG capsule   Commonly known as:  CYMBALTA   Dose:  60 mg   Quantity:  30 capsule        Take 1 capsule (60 mg) by mouth daily   Refills:  0        hydrOXYzine 25 MG tablet   Commonly known as:  ATARAX   Dose:  50 mg   Quantity:  30 tablet        Take 2 tablets (50 mg) by mouth nightly as needed (sleep)   Refills:  0        MIRTAZAPINE PO   Dose:  30 mg        Take 30 mg by mouth At Bedtime   Refills:  0        multivitamin w/minerals tablet   Dose:  1 tablet   Quantity:  30 each        Take 1 tablet by mouth daily   Refills:  0        omeprazole 40 MG DR capsule   Commonly known as:  priLOSEC    Dose:  40 mg   Quantity:  60 capsule        Take 1 capsule (40 mg) by mouth 2 times daily (before meals) Take 30-60 minutes before a meal.   Refills:  1        QUETIAPINE FUMARATE PO   Dose:  50 mg        Take 50 mg by mouth 3 times daily as needed   Refills:  0        spironolactone-HCTZ 25-25 MG tablet   Commonly known as:  ALDACTAZIDE   Dose:  1 tablet        Take 1 tablet by mouth daily   Refills:  0        TAMSULOSIN HCL PO   Dose:  0.4 mg        Take 0.4 mg by mouth daily   Refills:  0        TRAZODONE HCL PO   Dose:  200 mg        Take 200 mg by mouth nightly as needed   Refills:  0                Procedures and tests performed during your visit     Procedure/Test Number of Times Performed    Alcohol breath test POCT 1    Alcohol level blood 1    Discontinue 1:1 attendant for suicide risk 1    Glucose by meter 1    Glucose monitor nursing POCT 2    Patient care order - Give patient blankets, pillows, toothbrush.  Assist with ADLs as needed. 1    Vital signs 1      Orders Needing Specimen Collection     None      Pending Results     No orders found for last 3 day(s).            Pending Culture Results     No orders found for last 3 day(s).            Pending Results Instructions     If you had any lab results that were not finalized at the time of your Discharge, you can call the ED Lab Result RN at [phone]. You will be contacted by this team for any positive Lab results or changes in treatment. The nurses are available 7 days a week from 10A to 6:30P.  You can leave a message 24 hours per day and they will return your call.        Test Results From Your Hospital Stay        12/6/2018  6:39 AM      Component Results     Component Value Ref Range & Units Status    Alcohol Breath Test 0.125 (A) 0.00 - 0.01 Final         12/5/2018 11:50 PM      Component Results     Component Value Ref Range & Units Status    Glucose 75 70 - 99 mg/dL Final         12/6/2018 12:39 AM      Component Results     Component  Value Ref Range & Units Status    Ethanol g/dL 0.33 (HH) <0.01 g/dL Final    Critical Value called to and read back by  DR. LEONCIO LAWRENCE IN ED AT 0033 KMJ                  Clinical Quality Measure: Blood Pressure Screening     Your blood pressure was checked while you were in the emergency department today. The last reading we obtained was  BP: 147/69 . Please read the guidelines below about what these numbers mean and what you should do about them.  If your systolic blood pressure (the top number) is less than 120 and your diastolic blood pressure (the bottom number) is less than 80, then your blood pressure is normal. There is nothing more that you need to do about it.  If your systolic blood pressure (the top number) is 120-139 or your diastolic blood pressure (the bottom number) is 80-89, your blood pressure may be higher than it should be. You should have your blood pressure rechecked within a year by a primary care provider.  If your systolic blood pressure (the top number) is 140 or greater or your diastolic blood pressure (the bottom number) is 90 or greater, you may have high blood pressure. High blood pressure is treatable, but if left untreated over time it can put you at risk for heart attack, stroke, or kidney failure. You should have your blood pressure rechecked by a primary care provider within the next 4 weeks.  If your provider in the emergency department today gave you specific instructions to follow-up with your doctor or provider even sooner than that, you should follow that instruction and not wait for up to 4 weeks for your follow-up visit.        Thank you for choosing [facility]       Thank you for choosing [facility] for your care. Our goal is always to provide you with excellent care. Hearing back from our patients is one way we can continue to improve our services. Please take a few minutes to complete the written survey that you may receive in the mail after you visit with us. Thank you!    "     MyChart Information     [employer] lets you send messages to your doctor, view your test results, renew your prescriptions, schedule appointments and more. To sign up, go to [email or web address] . Click on \"Log in\" on the left side of the screen, which will take you to the Welcome page. Then click on \"Sign up Now\" on the right side of the page.     You will be asked to enter the access code listed below, as well as some personal information. Please follow the directions to create your username and password.     Your access code is: [email or web address]  Expires: 2019  3:56 PM     Your access code will  in 90 days. If you need help or a new code, please call your [facility] clinic or [phone].        Care EveryWhere ID     This is your Care EveryWhere ID. This could be used by other organizations to access your [facility] medical records  [id number]        Equal Access to Services     [facility] Hospital[facility] : Hadii fito garciao Solori, waaxda lualmitaadaha, qaybta kaalmada aderip, stan vale . So [facility] [phone].    ATENCIÓN: Si habla español, tiene a thompson disposición servicios gratuitos de asistencia lingüística. Llame al [phone].    We comply with applicable federal civil rights laws and [place] laws. We do not discriminate on the basis of race, color, national origin, age, disability, sex, sexual orientation, or gender identity.            After Visit Summary       This is your record. Keep this with you and show to your community pharmacist(s) and doctor(s) at your next visit.                  "

## 2018-12-06 VITALS
OXYGEN SATURATION: 95 % | WEIGHT: 220 LBS | HEIGHT: 67 IN | DIASTOLIC BLOOD PRESSURE: 69 MMHG | BODY MASS INDEX: 34.53 KG/M2 | RESPIRATION RATE: 18 BRPM | SYSTOLIC BLOOD PRESSURE: 147 MMHG | TEMPERATURE: 98.3 F

## 2018-12-06 LAB
ALCOHOL BREATH TEST: 0.12 (ref 0–0.01)
ETHANOL SERPL-MCNC: 0.33 G/DL

## 2018-12-06 PROCEDURE — 25000132 ZZH RX MED GY IP 250 OP 250 PS 637: Mod: GY | Performed by: EMERGENCY MEDICINE

## 2018-12-06 PROCEDURE — A9270 NON-COVERED ITEM OR SERVICE: HCPCS | Mod: GY | Performed by: EMERGENCY MEDICINE

## 2018-12-06 RX ORDER — LANOLIN ALCOHOL/MO/W.PET/CERES
3 CREAM (GRAM) TOPICAL
Status: DISCONTINUED | OUTPATIENT
Start: 2018-12-06 | End: 2018-12-06 | Stop reason: HOSPADM

## 2018-12-06 RX ORDER — LORAZEPAM 1 MG/1
1 TABLET ORAL ONCE
Status: COMPLETED | OUTPATIENT
Start: 2018-12-06 | End: 2018-12-06

## 2018-12-06 RX ORDER — ACETAMINOPHEN 325 MG/1
650 TABLET ORAL EVERY 4 HOURS PRN
Status: DISCONTINUED | OUTPATIENT
Start: 2018-12-06 | End: 2018-12-06 | Stop reason: HOSPADM

## 2018-12-06 RX ORDER — DIAZEPAM 5 MG
5 TABLET ORAL ONCE
Status: COMPLETED | OUTPATIENT
Start: 2018-12-06 | End: 2018-12-06

## 2018-12-06 RX ADMIN — DIAZEPAM 5 MG: 5 TABLET ORAL at 05:13

## 2018-12-06 RX ADMIN — Medication 1 SPRAY: at 08:09

## 2018-12-06 RX ADMIN — GUAIFENESIN AND DEXTROMETHORPHAN HYDROBROMIDE 1 TABLET: 600; 30 TABLET, EXTENDED RELEASE ORAL at 02:04

## 2018-12-06 RX ADMIN — LORAZEPAM 1 MG: 1 TABLET ORAL at 09:57

## 2018-12-06 RX ADMIN — MELATONIN 3 MG: 3 TAB ORAL at 04:01

## 2018-12-06 RX ADMIN — ACETAMINOPHEN 650 MG: 325 TABLET, FILM COATED ORAL at 04:01

## 2018-12-06 RX ADMIN — Medication 1 SPRAY: at 09:58

## 2018-12-06 RX ADMIN — Medication 1 SPRAY: at 02:04

## 2018-12-06 NOTE — ED PROVIDER NOTES
Patient was signed out to myself for repeat evaluation when sober for mental health stability.  Patient was seen by DEC as well as myself.  Patient denies feeling suicidal and admits to drinking 0.75 L of vodka on a daily basis for the last 1-1/2 weeks.  He does not have a history of withdrawal seizures.  He notes nasal congestion.  He has no pain on frontal or maxillary sinuses.  Pulmonary exam shows diminished lung sounds in the bases although he has no significant shortness of breath or cough complaints.  Patient is feeling tremulous at this time as his alcohol level is coming down.  Patient was given Ativan orally.  Patient lives with a roommate.  Patient looks safe for discharge is not suicidal.     Osorio Cifuentes MD  12/06/18 0985

## 2018-12-06 NOTE — ED NOTES
Pt was given a courtsey meal via ED RN. EDT also attempted x3 pt's BAL via breathalyzer pt is unable to complete this task due to pt's cold, COPD and CHF.   EDT informed ED MD and ED RN of this issue.

## 2018-12-06 NOTE — ED NOTES
Pt also informed ED ERT that he wants to die and does not want to be here anymore. Pt's ED RN was made aware of pt's comment he made to ED staff.

## 2018-12-06 NOTE — ED NOTES
Bed: [facility]  Expected date: 12/5/18  Expected time: 10:40 PM  Means of arrival: Ambulance  Comments:  HEMS 425 63M suicidal/intox

## 2018-12-06 NOTE — ED NOTES
Pt continues to hit the call light and has lots of requests. Pt is requesting tv remote; unfounded in the ED ALL remotes in  are broken. ED RN is @ pt's side @ this time.

## 2018-12-06 NOTE — PHARMACY-ADMISSION MEDICATION HISTORY
Admission medication history interview status for the 12/5/2018  admission is complete. See EPIC admission navigator for prior to admission medications     Medication history source reliability:Good    Actions taken by pharmacist (provider contacted, etc):None     Additional medication history information not noted on PTA med list :None    Medication reconciliation/reorder completed by provider prior to medication history? No    Time spent in this activity: 10    Prior to Admission medications    Medication Sig Last Dose Taking? Auth Provider   albuterol (PROAIR HFA/PROVENTIL HFA/VENTOLIN HFA) 108 (90 BASE) MCG/ACT Inhaler Inhale 2 puffs into the lungs every 6 hours as needed  12/5/2018 at Unknown time Yes Unknown, Entered By History   ATORVASTATIN CALCIUM PO Take 10 mg by mouth At Bedtime  Past Week at Unknown time Yes Unknown, Entered By History   diazepam (VALIUM) 5 MG tablet Take 0.5-1 tablets (2.5-5 mg) by mouth every 8 hours as needed for anxiety 12/6/2018 at Unknown time Yes Jude Duarte, DO   DULoxetine (CYMBALTA) 60 MG EC capsule Take 1 capsule (60 mg) by mouth daily 12/5/2018 at Unknown time Yes Brody Rand MD   fluticasone-vilanterol (BREO ELLIPTA) 200-25 MCG/INH oral inhaler Inhale 1 puff into the lungs daily as needed  Past Week at Unknown time Yes Unknown, Entered By History   hydrOXYzine (ATARAX) 25 MG tablet Take 2 tablets (50 mg) by mouth nightly as needed (sleep) Past Week at Unknown time Yes Dagoberto Garcia MD   MIRTAZAPINE PO Take 30 mg by mouth At Bedtime Past Week at Unknown time Yes Unknown, Entered By History   multivitamin, therapeutic with minerals (THERA-VIT-M) TABS tablet Take 1 tablet by mouth daily  Yes Jude Duarte, DO   omeprazole (PRILOSEC) 40 MG capsule Take 1 capsule (40 mg) by mouth 2 times daily (before meals) Take 30-60 minutes before a meal. 12/5/2018 at Unknown time Yes Piter Sequeira, DO   QUETIAPINE FUMARATE PO Take 50 mg by mouth 3 times  daily as needed Past Week at Unknown time Yes Unknown, Entered By History   spironolactone-hydrochlorothiazide (ALDACTAZIDE) 25-25 MG per tablet Take 1 tablet by mouth daily 12/5/2018 at Unknown time Yes Unknown, Entered By History   TAMSULOSIN HCL PO Take 0.4 mg by mouth daily more than a month, but is supposed to take Yes Unknown, Entered By History   TRAZODONE HCL PO Take 200 mg by mouth nightly as needed  Past Week at Unknown time Yes Unknown, Entered By History

## 2018-12-06 NOTE — DISCHARGE INSTRUCTIONS
Over the counter Mucinex DM as needed for congestion.   Follow up with primary care.  Seek help for your drinking.  Return with thoughts of harming yourself.    *[place] CD Intake  (type CD intake in the search field)  [email or web address]  [phone]   inpatient services [phone]  or [phone] To arrange an appointment with CD counselor   [facility]  [email or web address]  [phone] Hours vary, call for information  Rule 25 assessments  Counseling & assessments  For CD & outpatient treatment   [place]  [facility] ([facility])  http://mntc.org   [phone] [address] [facility]  Residential drug and alcohol programs serving teens and adults from all ethnic, socioeconomic and Yazidism backgrounds       AA                                     [phone]                        [place] and [facility] site  [email or web address]

## 2018-12-06 NOTE — ED PROVIDER NOTES
"  History     Chief Complaint:  Alcohol Intoxication & Suicidal Ideation    The history is provided by the patient and the EMS personnel.      Jim Richard is a 63 year old male with a history of alcoholism, anxiety, and depression who presents via EMS with alcoholic intoxication and suicidal ideation. Earlier today, a friend called EMS to do a welfare check on the patient after he was not answering phone calls. EMS arrived at the patient's residence at 7:00 am (about 17 hours ago) where they found him with a BARNEY of 0.24. He did not want to go the hospital and paramedics left. However, tonight the patient called 911 because he was not feeling well. When police arrived, the patient stated he wanted to kill himself so EMS was called to transport him to the ED. The patient states he is feeling suicidal with plan to \"drink myself to death.\" He has no homicidal ideation. He is mostly concerned because he states he hasn't \"slept in two weeks\" and I need to \"give me something to sleep.\" He remarks he is struggling with a cold with nasal congestion for the last 7-10 days without cough. Patient reports he has not been taking his usual medications though he does not verbalize why.    Allergies:  No known drug allergies    Medications:    albuterol (PROAIR HFA/PROVENTIL HFA/VENTOLIN HFA) 108 (90 BASE) MCG/ACT Inhaler   ATORVASTATIN CALCIUM PO   diazepam (VALIUM) 5 MG tablet   Duloxetine (CYMBALTA) 60 MG EC capsule   fluticasone-vilanterol (BREO ELLIPTA) 200-25 MCG/INH oral inhaler   hydroxyzine (ATARAX) 25 MG tablet   methocarbamol (ROBAXIN) 750 MG tablet   MIRTAZAPINE PO   omeprazole (PRILOSEC) 40 MG capsule   QUETIAPINE FUMARATE PO   spironolactone-hydrochlorothiazide (ALDACTAZIDE) 25-25 MG per tablet   TAMSULOSIN HCL PO   TRAZODONE HCL PO     Past Medical History:    Alcoholism  Anxiety   Chronic low back pain  Coronary artery disease   Depression   Esophageal varices with bleeding  GI bleed   Heart attack " "   Hepatomegaly   Hyperlipemia   Hypertension   Obstructive Sleep Apnea on CPAP   Peripheral vascular disease    PVD (peripheral vascular disease)    SDH (subdural hematoma)   Spinal stenosis   suicidal ideation    Past Surgical History:    Appendectomy  Bypass graft femoropopliteal  Endarterectomy femoral  Esophagogastroduodenoscopy x2  Hernia repair  Laminectomy, fusion lumbar three + level, combined  Tonsillectomy & Adenoidectomy    Family History:    Mental illness  CAD  Substance abuse  Lung cancer    Social History:  The patient presents to the ED alone via EMS.  Marital status:   Smoking status: Current Every Day Smoker, 1 pack/day  Alcohol use: Yes    Review of Systems   HENT: Positive for congestion.    Respiratory: Negative for cough.    Psychiatric/Behavioral: Positive for sleep disturbance and suicidal ideas.   All other systems reviewed and are negative.    Physical Exam     Patient Vitals for the past 24 hrs:   BP Temp Temp src Heart Rate Resp SpO2 Height Weight   12/06/18 0455 143/62 - - 77 16 93 % - -   12/05/18 2258 (!) 167/97 98  F (36.7  C) Oral 102 - 92 % 1.702 m (5' 7\") 99.8 kg (220 lb)       Physical Exam  General: WD/WN; well appearing middle aged  man; cooperative; smells of alcohol  Head:  Atraumatic  Eyes:  Conjunctivae, lids, and sclerae are normal  ENT:    Normal nose; MMM  Neck:  Supple; normal ROM  Resp:  No respiratory distress  GI:  Nondistended    MS:  Normal ROM  Skin:  Warm; non-diaphoretic; no pallor  Neuro: Awake; A&Ox3  Psych:  Blunt mood and affect; normal speech; not responding to internal stimuli; suicidal thought content with vague plan  Vitals reviewed.    Emergency Department Course     Laboratory:  Glucose by Meter: 75  (0039) Alcohol Breath Test POCT: 0.33 ()    Interventions:  2351: Hydroxyzine 25 mg PO  0204: Ocean 1 Spray Both Nostrils  0204: Mucinex DM  mg 1 Tablet PO  0401: Melatonin 3 mg PO  0401: Tylenol 650 mg PO  0513: Valium 5 mg " "PO    Emergency Department Course:  The patient arrived in the emergency department via EMS.    Past medical records, nursing notes, and vitals reviewed.  2255: I performed an exam of the patient and obtained history, as documented above.     0038: I spoke to lab regarding the patient's elevated blood alcohol level.     0058: I rechecked the patient and explained the findings. He states that he \"cannot breathe\" through his nose due to congestion and would like discharge because we are \"not helping\" him. I explained he is on a hold because he is intoxicated.    0247: I rechecked the patient.    0640: Patient restless on cart. Will re-check alcohol level.    Signed out to incoming ED physician, Dr. Cifuentes, pending DEC evaluation and disposition.    Impression & Plan      Medical Decision Making:  Jim is a 63-year-old man with history of \"anxiety, depression, and all those things\" as well as alcohol abuse who presents with suicidal ideation.  Reportedly EMS performed a well-being check on the patient earlier in the day and found his alcohol level to be elevated at 0.2 though he was not transported until he just prior to arrival when he called 911 again and when police arrived he reported he wanted to kill himself.  The health officer hold filled out by first responders indicates he had plan to overdose on pills.  Patient states he is feeling suicidal but for me has a vague plan to \"drink myself to death.\" He does remark that he has had a cold recently and wants me to \"help [him]\" because he \"cannot breathe\" through his nose.  He also indicates that he has not \"slept in 2 weeks\" and \"need[s] something to help me sleep.\"  Patient is on a health officer hold for his safety though one-to-one sitter is not indicated.  Patient's blood alcohol level is elevated at 0.33.  He is not hypoglycemic.  He was given Atarax to help with sleep without success.  He then was complaining of further difficulty breathing through his nose " because of his cold and congestion and I gave him [place] spray and Mucinex DM with some improvement.  Patient continued to be unable to rest and was eventually given Tylenol, melatonin, and Valium.  He did not require chemical or physical restraint or other acute interventions while under my care.  Repeat alcohol level is pending at the end of my shift and patient was endorsed to my partner, Dr. Cifuentes, pending DEC evaluation for suicidal ideation when sober with final safe disposition pending this assessment.    Diagnosis:    ICD-10-CM   1. Acute alcoholic intoxication in alcoholism without complication (H) F10.220   2. Suicidal thoughts R45.851   3. Upper respiratory tract infection, unspecified type J06.9   4. Sinus congestion R09.81       Disposition:  Signed out to incoming ED physician, Dr. Cifuentes, pending DEC evaluation.        Erin Casiano  12/5/2018    EMERGENCY DEPARTMENT  I, Erin Casiano, am serving as a scribe at 10:55 PM on 12/5/2018 to document services personally performed by Cassie Baca MD based on my observations and the provider's statements to me.        Cassie Baca MD  12/08/18 0042

## 2018-12-06 NOTE — ED NOTES
Report received. Assumed care of pt. Pt currently on  Hold with security present and will remain on that while in the ED. Belongings done by previous staff. Waiting to see DEC when more sober.

## 2018-12-06 NOTE — ED NOTES
Pt in common area. Requested new pitcher of water that EDT gave to him. Hot breakfast tray ordered.

## 2018-12-08 ASSESSMENT — ENCOUNTER SYMPTOMS: COUGH: 0

## 2018-12-24 ENCOUNTER — HOSPITAL ENCOUNTER (EMERGENCY)
Facility: CLINIC | Age: 63
Discharge: HOME | End: 2018-12-24
Attending: EMERGENCY MEDICINE | Admitting: EMERGENCY MEDICINE
Payer: MEDICARE

## 2018-12-24 VITALS
RESPIRATION RATE: 27 BRPM | SYSTOLIC BLOOD PRESSURE: 154 MMHG | BODY MASS INDEX: 34.53 KG/M2 | WEIGHT: 220 LBS | HEIGHT: 67 IN | TEMPERATURE: 98.6 F | DIASTOLIC BLOOD PRESSURE: 72 MMHG | OXYGEN SATURATION: 90 % | HEART RATE: 87 BPM

## 2018-12-24 DIAGNOSIS — F10.929 ALCOHOL INTOXICATION, WITH UNSPECIFIED COMPLICATION (H): ICD-10-CM

## 2018-12-24 DIAGNOSIS — R45.851 SUICIDAL IDEATION: ICD-10-CM

## 2018-12-24 DIAGNOSIS — L03.114 CELLULITIS OF LEFT HAND: ICD-10-CM

## 2018-12-24 LAB
ALBUMIN SERPL-MCNC: 2.9 G/DL (ref 3.4–5)
ALP SERPL-CCNC: 230 U/L (ref 40–150)
ALT SERPL W P-5'-P-CCNC: 35 U/L (ref 0–70)
AMPHETAMINES UR QL SCN: NEGATIVE
ANION GAP SERPL CALCULATED.3IONS-SCNC: 14 MMOL/L (ref 3–14)
APAP SERPL-MCNC: <2 MG/L (ref 10–20)
AST SERPL W P-5'-P-CCNC: 71 U/L (ref 0–45)
BARBITURATES UR QL: NEGATIVE
BASOPHILS # BLD AUTO: 0.1 10E9/L (ref 0–0.2)
BASOPHILS NFR BLD AUTO: 1.9 %
BENZODIAZ UR QL: NEGATIVE
BILIRUB SERPL-MCNC: 0.5 MG/DL (ref 0.2–1.3)
BUN SERPL-MCNC: 23 MG/DL (ref 7–30)
CALCIUM SERPL-MCNC: 7.9 MG/DL (ref 8.5–10.1)
CANNABINOIDS UR QL SCN: NEGATIVE
CHLORIDE SERPL-SCNC: 101 MMOL/L (ref 94–109)
CO2 SERPL-SCNC: 23 MMOL/L (ref 20–32)
COCAINE UR QL: NEGATIVE
CREAT SERPL-MCNC: 0.81 MG/DL (ref 0.66–1.25)
DIFFERENTIAL METHOD BLD: ABNORMAL
EOSINOPHIL # BLD AUTO: 0 10E9/L (ref 0–0.7)
EOSINOPHIL NFR BLD AUTO: 0.6 %
ERYTHROCYTE [DISTWIDTH] IN BLOOD BY AUTOMATED COUNT: 19.9 % (ref 10–15)
ETHANOL SERPL-MCNC: 0.29 G/DL
GFR SERPL CREATININE-BSD FRML MDRD: >90 ML/MIN/{1.73_M2}
GLUCOSE SERPL-MCNC: 71 MG/DL (ref 70–99)
HCT VFR BLD AUTO: 27.9 % (ref 40–53)
HGB BLD-MCNC: 8.9 G/DL (ref 13.3–17.7)
IMM GRANULOCYTES # BLD: 0 10E9/L (ref 0–0.4)
IMM GRANULOCYTES NFR BLD: 0.1 %
LYMPHOCYTES # BLD AUTO: 1.5 10E9/L (ref 0.8–5.3)
LYMPHOCYTES NFR BLD AUTO: 21.1 %
MAGNESIUM SERPL-MCNC: 1.8 MG/DL (ref 1.6–2.3)
MCH RBC QN AUTO: 24 PG (ref 26.5–33)
MCHC RBC AUTO-ENTMCNC: 31.9 G/DL (ref 31.5–36.5)
MCV RBC AUTO: 75 FL (ref 78–100)
MONOCYTES # BLD AUTO: 1 10E9/L (ref 0–1.3)
MONOCYTES NFR BLD AUTO: 14.2 %
NEUTROPHILS # BLD AUTO: 4.5 10E9/L (ref 1.6–8.3)
NEUTROPHILS NFR BLD AUTO: 62.1 %
NRBC # BLD AUTO: 0 10*3/UL
NRBC BLD AUTO-RTO: 0 /100
OPIATES UR QL SCN: NEGATIVE
PCP UR QL SCN: NEGATIVE
PLATELET # BLD AUTO: 248 10E9/L (ref 150–450)
POTASSIUM SERPL-SCNC: 3.5 MMOL/L (ref 3.4–5.3)
PROT SERPL-MCNC: 7 G/DL (ref 6.8–8.8)
RBC # BLD AUTO: 3.71 10E12/L (ref 4.4–5.9)
SODIUM SERPL-SCNC: 138 MMOL/L (ref 133–144)
WBC # BLD AUTO: 7.2 10E9/L (ref 4–11)

## 2018-12-24 PROCEDURE — 25000128 H RX IP 250 OP 636: Performed by: EMERGENCY MEDICINE

## 2018-12-24 PROCEDURE — 99285 EMERGENCY DEPT VISIT HI MDM: CPT | Mod: 25

## 2018-12-24 PROCEDURE — 90791 PSYCH DIAGNOSTIC EVALUATION: CPT

## 2018-12-24 PROCEDURE — 96366 THER/PROPH/DIAG IV INF ADDON: CPT

## 2018-12-24 PROCEDURE — 80320 DRUG SCREEN QUANTALCOHOLS: CPT | Performed by: EMERGENCY MEDICINE

## 2018-12-24 PROCEDURE — 85025 COMPLETE CBC W/AUTO DIFF WBC: CPT | Performed by: EMERGENCY MEDICINE

## 2018-12-24 PROCEDURE — 96365 THER/PROPH/DIAG IV INF INIT: CPT

## 2018-12-24 PROCEDURE — 80053 COMPREHEN METABOLIC PANEL: CPT | Performed by: EMERGENCY MEDICINE

## 2018-12-24 PROCEDURE — 80307 DRUG TEST PRSMV CHEM ANLYZR: CPT | Performed by: EMERGENCY MEDICINE

## 2018-12-24 PROCEDURE — 25000132 ZZH RX MED GY IP 250 OP 250 PS 637: Mod: GY | Performed by: EMERGENCY MEDICINE

## 2018-12-24 PROCEDURE — 25000125 ZZHC RX 250: Performed by: EMERGENCY MEDICINE

## 2018-12-24 PROCEDURE — 83735 ASSAY OF MAGNESIUM: CPT | Performed by: EMERGENCY MEDICINE

## 2018-12-24 PROCEDURE — A9270 NON-COVERED ITEM OR SERVICE: HCPCS | Mod: GY | Performed by: EMERGENCY MEDICINE

## 2018-12-24 PROCEDURE — 80329 ANALGESICS NON-OPIOID 1 OR 2: CPT | Performed by: EMERGENCY MEDICINE

## 2018-12-24 RX ORDER — TRAZODONE HYDROCHLORIDE 100 MG/1
200 TABLET ORAL ONCE
Status: COMPLETED | OUTPATIENT
Start: 2018-12-24 | End: 2018-12-24

## 2018-12-24 RX ORDER — LORAZEPAM 1 MG/1
1 TABLET ORAL ONCE
Status: COMPLETED | OUTPATIENT
Start: 2018-12-24 | End: 2018-12-24

## 2018-12-24 RX ADMIN — AMOXICILLIN AND CLAVULANATE POTASSIUM 1 TABLET: 875; 125 TABLET, FILM COATED ORAL at 04:29

## 2018-12-24 RX ADMIN — LORAZEPAM 1 MG: 1 TABLET ORAL at 10:31

## 2018-12-24 RX ADMIN — FOLIC ACID: 5 INJECTION, SOLUTION INTRAMUSCULAR; INTRAVENOUS; SUBCUTANEOUS at 04:29

## 2018-12-24 RX ADMIN — TRAZODONE HYDROCHLORIDE 200 MG: 100 TABLET ORAL at 04:29

## 2018-12-24 ASSESSMENT — ENCOUNTER SYMPTOMS
WOUND: 1
BLOOD IN STOOL: 0
DIARRHEA: 0
ABDOMINAL PAIN: 0
VOMITING: 0

## 2018-12-24 ASSESSMENT — MIFFLIN-ST. JEOR: SCORE: 1746.54

## 2018-12-24 NOTE — ED PROVIDER NOTES
Patient was signed out to me awaiting sober reevaluation by the dec .  He has a long-standing history of alcohol abuse.  He came in last night feeling depressed and suicidal.    When I interviewed the patient the morning he reports he often has worsening symptoms of depression  when intoxicated.  He admitted he was feeling suicidal last night, but called 911 because he did not wish to harm himself.  He was met by the mental health  and is now able to contract for safety.  He reports he has adequate outpatient resources for accessing mental health care, and he has an AA sponsor.    The patient admits that he is at risk for withdrawal.  He denies any current withdrawal symptoms.  Vital signs show only mild hypertension.  There is no resting tremor.  Patient is given a dose of oral Ativan.  He is advised to return to the ED for any signs of withdrawal.  He will also return for thoughts of self-harm or for other concerns.    Incidentally noted is an abrasion on his left hand which has some mild surrounding cellulitis.  He is Abebe taking Augmentin for this.  The dose was given in the ED.  We will continue to monitor for any progression of symptoms and should return if they develop.    Patient is able to contract for safety.  He assures me he will return to the ED if any thoughts of self-harm return.  He is discharged home by Dagoberto Jones MD  12/24/18 4589

## 2018-12-24 NOTE — ED PROVIDER NOTES
"  History     Chief Complaint:  Alcohol Problem and Suicidal Ideation     The history is provided by the patient and the EMS personnel.      Jim Richard is a 63 year old male who presents to the emergency department today via EMS for evaluation of alcohol intoxication. Patient called EMS several times due to increased depression as he is not with his kids for the holidays. He drank 0.5L of vodka this evening and stated \"I will just lay here and die\" which prompted EMS transfer to ED for further evaluation. Additionally, patient has old abrasions on his left hand from digging for his keys in the space between the car's center console and the seat.  The hand was more red and swollen previously, but this has been improving.  He denies hand pain. Upon further review, patient was started on a course of Augmentin and Medrol Dosepak on 12/19 for otitis media, but he states that he hasn't taken the antibiotic since Wednesday. He denies any current suicidal ideations as well as any abdominal pain, diarrhea, emesis, or blood in stool.  He has only taken one dose of this. Of not, patient reports a history of exacerbation of his depression when drinking.   No fever/chills.    Allergies:  No Known Drug Allergies    Medications:    Valium   Cymbalta  Atarax  Prilosec  Trazodone    Past Medical History:    Alcoholism  Anxiety  CAD  Depression  Heart attack  Hepatomegaly  Hyperlipidemia  Hypertension  JANIS on CPAP  Peripheral vascular disease  PVD  SDH  Spinal stenosis    Past Surgical History:    Appendectomy  Endarterectomy femoral  Laminectomy  Tonsillectomy   Adenoidectomy    Family History:    Son: mental illness  Mother: early onset of CAD  Father: substance abuse, lung cancer  Brother: substance abuse    Social History:  Smoking Status: Current Every Day Smoker  Smokeless Tobacco: Never Used  Alcohol Use: Positive  Marital Status:       Review of Systems   Gastrointestinal: Negative for abdominal pain, blood in " "stool, diarrhea and vomiting.   Skin: Positive for wound.   Psychiatric/Behavioral: Positive for suicidal ideas.   All other systems reviewed and are negative.      Physical Exam     Patient Vitals for the past 24 hrs:   BP Temp Temp src Pulse Resp SpO2 Height Weight   12/24/18 0226 153/81 -- -- 85 -- -- -- --   12/24/18 0201 -- 98.6  F (37  C) Oral -- -- 91 % -- --   12/24/18 0200 149/89 -- -- 88 16 93 % 1.702 m (5' 7\") 99.8 kg (220 lb)     Physical Exam  Nursing note and vitals reviewed.  Constitutional:  Appears well-developed and well-nourished.   HENT:   Head:    Atraumatic.   Mouth/Throat:   Oropharynx is clear and moist. No oropharyngeal exudate.   Eyes:    Pupils are equal, round, and reactive to light.   Neck:    Normal range of motion. Neck supple.      No tracheal deviation present. No thyromegaly present.   Cardiovascular:  Normal rate, regular rhythm, no murmur   Pulmonary/Chest: Breath sounds are clear and equal without wheezes or crackles.  Abdominal:   Soft. Bowel sounds are normal. Exhibits no distension and      no mass. There is no tenderness.      There is no rebound and no guarding.   Left hand:  Multiple old abrasions to dorsal of left hand over 1st, 2nd, and 3rd metacarpal with surrounding erythema and swelling   Lymphadenopathy:  No cervical adenopathy.   Neurological:   Alert and oriented to person, place, and time.   Skin:    Skin is warm and dry. No rash noted. No pallor.     Emergency Department Course     Laboratory:  Laboratory findings were communicated with the patient who voiced understanding of the findings.    CBC: WBC 7.2, HGB 27.9 (L),   CMP: calcium 7.9 (L), albumin 2.9 (L), ALKPHOS 230 (H), AST 71 (H) o/w WNL (Creatinine 0.81)  Magnesium: 1.8  Alcohol level blood: 0.29 (H)  Acetaminophen level: <2  Drug abuse screen: Negative     Interventions:  0429 Desyrel 200 mg PO  0429 Augmentin 1 tablet PO    Emergency Department Course:    0211 Nursing notes and vitals " reviewed.    0230 The patient provided a urine sample here in the emergency department. This was sent for laboratory testing, findings above.    0240 I performed an exam of the patient as documented above.     0416 IV was inserted and blood was drawn for laboratory testing, results above.    0718 Patient is signed out to oncoming provider.     Impression & Plan      Medical Decision Making:  He has alcohol intoxication and suicidal ideations so the plan is that he be evaluated by the DEC  when he becomes sober in the morning. He has minimal cellulitis of the hand that is improving in spite of the fact he has not taken his antibiotics in a couple of days so the Augmentin was reinitiated here. He was told to continue that at home if he gets discharged. There was no sign of sepsis or lymphangitis or abscess.     Diagnosis:    ICD-10-CM    1. Suicidal ideation R45.851    2. Alcohol intoxication, with unspecified complication (H) F10.929    3. Cellulitis of left hand L03.114        Disposition:   The patient is signed out to my colleague, Dr. Garcia .     Scribe Disclosure:  I, Jere Valle, am serving as a scribe at 5:01 AM on 12/24/2018 to document services personally performed by Danae Obregon MD based on my observations and the provider's statements to me.     EMERGENCY DEPARTMENT       Danae Obregon MD  12/24/18 6261       Danae Obregon MD  12/24/18 7018

## 2019-01-21 ENCOUNTER — MEDICAL CORRESPONDENCE (OUTPATIENT)
Dept: HEALTH INFORMATION MANAGEMENT | Facility: CLINIC | Age: 64
End: 2019-01-21

## 2019-01-21 ENCOUNTER — APPOINTMENT (OUTPATIENT)
Dept: GENERAL RADIOLOGY | Facility: CLINIC | Age: 64
End: 2019-01-21
Payer: MEDICARE

## 2019-01-21 ENCOUNTER — HOSPITAL ENCOUNTER (EMERGENCY)
Facility: CLINIC | Age: 64
Discharge: HOME | End: 2019-01-21
Attending: EMERGENCY MEDICINE | Admitting: EMERGENCY MEDICINE
Payer: MEDICARE

## 2019-01-21 VITALS
DIASTOLIC BLOOD PRESSURE: 78 MMHG | WEIGHT: 200 LBS | OXYGEN SATURATION: 90 % | HEART RATE: 82 BPM | HEIGHT: 67 IN | TEMPERATURE: 98.6 F | SYSTOLIC BLOOD PRESSURE: 140 MMHG | BODY MASS INDEX: 31.39 KG/M2

## 2019-01-21 DIAGNOSIS — R41.82 ALTERED MENTAL STATUS, UNSPECIFIED ALTERED MENTAL STATUS TYPE: ICD-10-CM

## 2019-01-21 DIAGNOSIS — J42 CHRONIC BRONCHITIS, UNSPECIFIED CHRONIC BRONCHITIS TYPE (H): ICD-10-CM

## 2019-01-21 LAB — ALCOHOL BREATH TEST: 0.29 (ref 0–0.01)

## 2019-01-21 PROCEDURE — 82075 ASSAY OF BREATH ETHANOL: CPT

## 2019-01-21 PROCEDURE — 94640 AIRWAY INHALATION TREATMENT: CPT

## 2019-01-21 PROCEDURE — 25000125 ZZHC RX 250: Performed by: EMERGENCY MEDICINE

## 2019-01-21 PROCEDURE — 99284 EMERGENCY DEPT VISIT MOD MDM: CPT | Mod: 25

## 2019-01-21 PROCEDURE — 71046 X-RAY EXAM CHEST 2 VIEWS: CPT

## 2019-01-21 PROCEDURE — 25000131 ZZH RX MED GY IP 250 OP 636 PS 637: Mod: GY | Performed by: EMERGENCY MEDICINE

## 2019-01-21 RX ORDER — ONDANSETRON 4 MG/1
4 TABLET, ORALLY DISINTEGRATING ORAL ONCE
Status: COMPLETED | OUTPATIENT
Start: 2019-01-21 | End: 2019-01-21

## 2019-01-21 RX ORDER — IPRATROPIUM BROMIDE AND ALBUTEROL SULFATE 2.5; .5 MG/3ML; MG/3ML
3 SOLUTION RESPIRATORY (INHALATION) ONCE
Status: COMPLETED | OUTPATIENT
Start: 2019-01-21 | End: 2019-01-21

## 2019-01-21 RX ORDER — AZITHROMYCIN 250 MG/1
TABLET, FILM COATED ORAL
Qty: 6 TABLET | Refills: 0 | Status: SHIPPED | OUTPATIENT
Start: 2019-01-21 | End: 2019-01-26

## 2019-01-21 RX ADMIN — ONDANSETRON 4 MG: 4 TABLET, ORALLY DISINTEGRATING ORAL at 08:38

## 2019-01-21 RX ADMIN — IPRATROPIUM BROMIDE AND ALBUTEROL SULFATE 3 ML: .5; 3 SOLUTION RESPIRATORY (INHALATION) at 06:36

## 2019-01-21 ASSESSMENT — MIFFLIN-ST. JEOR: SCORE: 1655.82

## 2019-01-21 ASSESSMENT — ENCOUNTER SYMPTOMS
SHORTNESS OF BREATH: 0
FEVER: 0
COUGH: 0

## 2019-01-21 NOTE — ED NOTES
Pt to xray minna well, OJ given asking to go home will take a cab.  Md informed needs a ride d/t etoh level

## 2019-01-21 NOTE — ED AVS SNAPSHOT
Emergency Department  [address] [place] [id number]  Phone:  [phone]  Fax:  [phone]                                    Jim Richard   MRN: [id number]    Department:   Emergency Department   Date of Visit:  1/21/2019           After Visit Summary Signature Page    I have received my discharge instructions, and my questions have been answered. I have discussed any challenges I see with this plan with the nurse or doctor.    ..........................................................................................................................................  Patient/Patient Representative Signature      ..........................................................................................................................................  Patient Representative Print Name and Relationship to Patient    ..................................................               ................................................  Date                                   Time    ..........................................................................................................................................  Reviewed by Signature/Title    ...................................................              ..............................................  Date                                               Time          22EPIC Rev 08/18

## 2019-01-21 NOTE — ED PROVIDER NOTES
"  History     Chief Complaint:  Alcohol Intoxication and Suicidal Ideation    HPI   Jim Richard is a 63 year old male with a history of anxiety, depression, alcoholism, esophageal varices with bleeding, and obstructive sleep apnea among others who presents to the emergency department today via EMS for evaluation of alcohol intoxication and suicidal ideation. Per EMS, the patient had left a phone message for his ex-wife and, upon getting off the phone, called his roommate and stated that he wanted to kill himself. His roommate then called 911 out of concern for the patient's safety. EMS adds that the patient reported one episode of blood in his sputum occurring at 2300. Here the patient denies any current suicidal thoughts, stating that they typically wax and wane and are brought on particularly when drinking. He endorses drinking \"heavily\" last night and reports his last drink was consumed around 0000. The patient notes a recent respiratory illness but states he has not been using his inhaler in increasing amounts. He voices concern for new bilateral hand swelling but denies any fever, change in cough, change in leg swelling, or change in shortness of breath when laying down.    Allergies:  No Known Drug Allergies     Medications:    Albuterol  Atorvastatin calcium  Cymbalta  Breo ellipta  Atarax  Mirtazapine  Prilosec  Quetiapine fumarate  Aldactazide  Tamsulosin  Trazodone    Past Medical History:    Alcoholism  Anxiety  Coronary artery disease  Depression  Esophageal varices with bleeding  Myocardial infarction  Hepatomegaly  Hyperlipidemia  Hypertension  Obstructive sleep apnea on CPAP  Peripheral vascular disease  Subdural hematoma  Spinal stenosis    Past Surgical History:    Appendectomy  Bypass graft femoropopliteal  Endarterectomy femoral  Abdominal hernia repair  Laminectomy, fusion lumbar three+ level  Tonsillectomy and adenoidectomy    Family History:    Son: mental illness  Mother: CAD  Father: " "substance abuse, lung cancer  Brother: substance abuse    Social History:  Smoking Status: Current Every Day Smoker   Packs/day: 1   Types: Cigarettes  Smokeless Tobacco: Never Used  Alcohol Use: Positive  Drug Use: Negative  Marital Status:        Review of Systems   Constitutional: Negative for fever.        Intoxication   Respiratory: Negative for cough and shortness of breath.         One episode hemoptysis   Cardiovascular: Negative for leg swelling.   Musculoskeletal:        Bilateral hand swelling   Psychiatric/Behavioral: Positive for suicidal ideas.   All other systems reviewed and are negative.    Physical Exam     Patient Vitals for the past 24 hrs:   BP Temp Temp src Pulse SpO2 Height Weight   01/21/19 0700 -- -- -- -- 96 % -- --   01/21/19 0606 128/66 98.6  F (37  C) Oral 82 93 % 1.702 m (5' 7\") 90.7 kg (200 lb)     Physical Exam  Constitutional: vitals reviewed  HENT: Moist oral mucosa.   Eyes: Grossly normal vision. Pupils are equal and round. Extraocular movements intact.  Sclera clear and without icterus.   Cardiovascular: Normal rate. Regular rhythm. S1 and S2 without audible murmurs. 2+ radial pulses. Normal capillary refill.  Respiratory: Effort normal. End expiratory wheezing and prolonged expiratory phase bilaterally.  Gastrointestinal: Soft. No distension. No tenderness to palpation. No rebound or guarding.  Neurologic: Alert and awake. Coordinated movement of extremities. Slight slurring of speech. No fasciculations.  Skin: No diaphoresis, rashes, ecchymoses, or lesions.  Musculoskeletal: Bilateral pitting edema to upper tibia. L>R  Psychiatric: Appropriate affect. Behavior is normal. Intact recent and remote memory. Linear thought process.    Emergency Department Course     Imaging:  Radiology findings were communicated with the patient who voiced understanding of the findings.    XR Chest 2 Views  1. Subacute versus chronic bilateral rib fractures.  2. Enlarged cardiac silhouette " likely represents cardiomegaly versus  less likely pericardial effusion.  3. No evidence of acute cardiopulmonary disease is seen. Etiology for  patient's hemoptysis is not definitely identified.  Reading per radiology    Laboratory:  Laboratory findings were communicated with the patient who voiced understanding of the findings.    Alcohol Breath Test POCT: 0.29  (A)    Interventions:  0636 Duoneb 3 ml Neb   0838 Zofran 4 mg Oral    Emergency Department Course:    0609 Alcohol breath test obtained.     0616 Nursing notes and vitals reviewed.    0637 I performed an exam of the patient as documented above.     0713 The patient was sent for a chest xray while in the emergency department, results above.     0833 Recheck and update.     I personally reviewed the laboratory and imaging results with the patient and answered all related questions prior to discharge.    Impression & Plan      Medical Decision Making:  Jim Richard is a 63 year old male who presents to the emergency department today for evaluation of expression of suicidal thoughts in the setting of alcohol intoxication.  The patient has frequent presentation for similar symptoms, and does not usually endorse suicidal ideation upon sobriety.  That was the case today, as when the patient was clinically sober and for the duration of his ED visit he denied any suicidal ideation.  Based on his history, I do not see a need for further mental health evaluation at this time.  His other symptom was mild hypoxia and blood-streaked sputum.  Differential included acute exacerbation of COPD or bronchitis, pulmonary embolus, pneumonia, TB.  His exam shows diffuse expiratory wheezing and prolonged expiratory phase suggestive of acute COPD and bronchospasm.  He has no clinical features or findings of DVT and no risk factors for PE.  Chest x-ray was performed and shows no evidence of pneumonia.  The patient had improvement in his oxygen level and pulmonary exam after  a single DuoNeb in the emergency department.  Given the significant improvement, I have lower suspicion for pulmonary embolus.  This is likely COPD exacerbation with sputum change.  I do not feel that prednisone is a wise choice in this patient given his gastritis and esophagitis and variceal history.  He will be treated with azithromycin for the sputum change and was educated on use of albuterol nebulizers at home.  Return precautions were discussed.  Follow-up with primary care.  Patient left the emergency department in stable condition.    Diagnosis:    ICD-10-CM    1. Altered mental status, unspecified altered mental status type R41.82    2. Chronic bronchitis, unspecified chronic bronchitis type (H) J42      Disposition:   The patient is discharged to home.    Discharge Medications:     Review of your medicines      UNREVIEWED medicines. Ask your doctor about these medicines      Dose / Directions   albuterol 108 (90 Base) MCG/ACT inhaler  Commonly known as:  PROAIR HFA/PROVENTIL HFA/VENTOLIN HFA      Dose:  2 puff  Inhale 2 puffs into the lungs every 6 hours as needed  Refills:  0     ATORVASTATIN CALCIUM PO      Dose:  10 mg  Take 10 mg by mouth At Bedtime  Refills:  0     BREO ELLIPTA 200-25 MCG/INH inhaler  Generic drug:  fluticasone-vilanterol      Dose:  1 puff  Inhale 1 puff into the lungs daily as needed  Refills:  0     diazepam 5 MG tablet  Commonly known as:  VALIUM  Used for:  Anxiety      Dose:  2.5-5 mg  Take 0.5-1 tablets (2.5-5 mg) by mouth every 8 hours as needed for anxiety  Quantity:  10 tablet  Refills:  0     DULoxetine 60 MG capsule  Commonly known as:  CYMBALTA  Used for:  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)      Dose:  60 mg  Take 1 capsule (60 mg) by mouth daily  Quantity:  30 capsule  Refills:  0     hydrOXYzine 25 MG tablet  Commonly known as:  ATARAX      Dose:  50 mg  Take 2 tablets (50 mg) by mouth nightly as needed (sleep)  Quantity:  30  tablet  Refills:  0     MIRTAZAPINE PO      Dose:  30 mg  Take 30 mg by mouth At Bedtime  Refills:  0     multivitamin w/minerals tablet  Used for:  Alcoholic intoxication without complication (H)      Dose:  1 tablet  Take 1 tablet by mouth daily  Quantity:  30 each  Refills:  0     omeprazole 40 MG DR capsule  Commonly known as:  priLOSEC  Used for:  Hematemesis without nausea      Dose:  40 mg  Take 1 capsule (40 mg) by mouth 2 times daily (before meals) Take 30-60 minutes before a meal.  Quantity:  60 capsule  Refills:  1     QUETIAPINE FUMARATE PO      Dose:  50 mg  Take 50 mg by mouth 3 times daily as needed  Refills:  0     spironolactone-HCTZ 25-25 MG tablet  Commonly known as:  ALDACTAZIDE      Dose:  1 tablet  Take 1 tablet by mouth daily  Refills:  0     TAMSULOSIN HCL PO      Dose:  0.4 mg  Take 0.4 mg by mouth daily  Refills:  0     TRAZODONE HCL PO      Dose:  200 mg  Take 200 mg by mouth nightly as needed  Refills:  0        START taking      Dose / Directions   azithromycin 250 MG tablet  Commonly known as:  ZITHROMAX Z-JOHN      Two tablets on the first day, then one tablet daily for the next 4 days  Quantity:  6 tablet  Refills:  0           Where to get your medicines      Some of these will need a paper prescription and others can be bought over the counter. Ask your nurse if you have questions.    Bring a paper prescription for each of these medications    azithromycin 250 MG tablet       Scribe Disclosure:  Megan VALENTE, am serving as a scribe at 7:14 AM on 1/21/2019 to document services personally performed by Juan F Gudino MD based on my observations and the provider's statements to me.     EMERGENCY DEPARTMENT       Juan F Gudino MD  01/21/19 0921

## 2019-01-21 NOTE — ED NOTES
Bed: ED16  Expected date:   Expected time:   Means of arrival:   Comments:  chelsy 433 63M suicidal

## 2019-02-13 ENCOUNTER — HOSPITAL ENCOUNTER (EMERGENCY)
Facility: CLINIC | Age: 64
Discharge: HOME | End: 2019-02-14
Attending: EMERGENCY MEDICINE | Admitting: EMERGENCY MEDICINE
Payer: MEDICARE

## 2019-02-13 DIAGNOSIS — R45.851 SUICIDE IDEATION: ICD-10-CM

## 2019-02-13 DIAGNOSIS — F10.920 ALCOHOLIC INTOXICATION WITHOUT COMPLICATION (H): ICD-10-CM

## 2019-02-13 PROCEDURE — 80320 DRUG SCREEN QUANTALCOHOLS: CPT | Performed by: EMERGENCY MEDICINE

## 2019-02-13 PROCEDURE — 82075 ASSAY OF BREATH ETHANOL: CPT

## 2019-02-13 PROCEDURE — 80048 BASIC METABOLIC PNL TOTAL CA: CPT | Performed by: EMERGENCY MEDICINE

## 2019-02-13 PROCEDURE — 25000125 ZZHC RX 250: Performed by: EMERGENCY MEDICINE

## 2019-02-13 PROCEDURE — 93005 ELECTROCARDIOGRAM TRACING: CPT

## 2019-02-13 PROCEDURE — 99285 EMERGENCY DEPT VISIT HI MDM: CPT | Mod: 25

## 2019-02-13 PROCEDURE — 96375 TX/PRO/DX INJ NEW DRUG ADDON: CPT

## 2019-02-13 PROCEDURE — 80329 ANALGESICS NON-OPIOID 1 OR 2: CPT | Performed by: EMERGENCY MEDICINE

## 2019-02-13 PROCEDURE — 80076 HEPATIC FUNCTION PANEL: CPT | Performed by: EMERGENCY MEDICINE

## 2019-02-13 PROCEDURE — 83690 ASSAY OF LIPASE: CPT | Performed by: EMERGENCY MEDICINE

## 2019-02-13 RX ADMIN — FAMOTIDINE 20 MG: 10 INJECTION, SOLUTION INTRAVENOUS at 23:59

## 2019-02-13 NOTE — ED AVS SNAPSHOT
Emergency Department  [address] [facility] [address]  Phone:  [phone]  Fax:  [phone]                                    Jim Richard   MRN: [id number]    Department:   Emergency Department   Date of Visit:  2/13/2019           After Visit Summary Signature Page    I have received my discharge instructions, and my questions have been answered. I have discussed any challenges I see with this plan with the nurse or doctor.    ..........................................................................................................................................  Patient/Patient Representative Signature      ..........................................................................................................................................  Patient Representative Print Name and Relationship to Patient    ..................................................               ................................................  Date                                   Time    ..........................................................................................................................................  Reviewed by Signature/Title    ...................................................              ..............................................  Date                                               Time          22EPIC Rev 08/18

## 2019-02-14 VITALS
RESPIRATION RATE: 16 BRPM | OXYGEN SATURATION: 92 % | WEIGHT: 215 LBS | SYSTOLIC BLOOD PRESSURE: 129 MMHG | TEMPERATURE: 98.8 F | DIASTOLIC BLOOD PRESSURE: 60 MMHG | HEART RATE: 85 BPM | BODY MASS INDEX: 33.67 KG/M2

## 2019-02-14 LAB
ALBUMIN SERPL-MCNC: 3.1 G/DL (ref 3.4–5)
ALCOHOL BREATH TEST: 0.11 (ref 0–0.01)
ALP SERPL-CCNC: 263 U/L (ref 40–150)
ALT SERPL W P-5'-P-CCNC: 41 U/L (ref 0–70)
ANION GAP SERPL CALCULATED.3IONS-SCNC: 11 MMOL/L (ref 3–14)
APAP SERPL-MCNC: <2 MG/L (ref 10–20)
AST SERPL W P-5'-P-CCNC: 89 U/L (ref 0–45)
BILIRUB DIRECT SERPL-MCNC: 0.2 MG/DL (ref 0–0.2)
BILIRUB SERPL-MCNC: 0.4 MG/DL (ref 0.2–1.3)
BUN SERPL-MCNC: 13 MG/DL (ref 7–30)
CALCIUM SERPL-MCNC: 8.1 MG/DL (ref 8.5–10.1)
CHLORIDE SERPL-SCNC: 109 MMOL/L (ref 94–109)
CO2 SERPL-SCNC: 23 MMOL/L (ref 20–32)
CREAT SERPL-MCNC: 0.94 MG/DL (ref 0.66–1.25)
ETHANOL SERPL-MCNC: 0.33 G/DL
GFR SERPL CREATININE-BSD FRML MDRD: 85 ML/MIN/{1.73_M2}
GLUCOSE SERPL-MCNC: 92 MG/DL (ref 70–99)
INTERPRETATION ECG - MUSE: NORMAL
LIPASE SERPL-CCNC: 536 U/L (ref 73–393)
POTASSIUM SERPL-SCNC: 3.6 MMOL/L (ref 3.4–5.3)
PROT SERPL-MCNC: 7.3 G/DL (ref 6.8–8.8)
SODIUM SERPL-SCNC: 143 MMOL/L (ref 133–144)

## 2019-02-14 PROCEDURE — 96366 THER/PROPH/DIAG IV INF ADDON: CPT

## 2019-02-14 PROCEDURE — 96372 THER/PROPH/DIAG INJ SC/IM: CPT

## 2019-02-14 PROCEDURE — 96375 TX/PRO/DX INJ NEW DRUG ADDON: CPT

## 2019-02-14 PROCEDURE — 25000125 ZZHC RX 250: Performed by: EMERGENCY MEDICINE

## 2019-02-14 PROCEDURE — 25000128 H RX IP 250 OP 636

## 2019-02-14 PROCEDURE — 90791 PSYCH DIAGNOSTIC EVALUATION: CPT

## 2019-02-14 PROCEDURE — A9270 NON-COVERED ITEM OR SERVICE: HCPCS | Mod: GY | Performed by: EMERGENCY MEDICINE

## 2019-02-14 PROCEDURE — 25000128 H RX IP 250 OP 636: Performed by: EMERGENCY MEDICINE

## 2019-02-14 PROCEDURE — 25800030 ZZH RX IP 258 OP 636: Performed by: EMERGENCY MEDICINE

## 2019-02-14 PROCEDURE — 25000132 ZZH RX MED GY IP 250 OP 250 PS 637: Mod: GY | Performed by: EMERGENCY MEDICINE

## 2019-02-14 PROCEDURE — 96365 THER/PROPH/DIAG IV INF INIT: CPT

## 2019-02-14 RX ORDER — LORAZEPAM 1 MG/1
1 TABLET ORAL ONCE
Status: COMPLETED | OUTPATIENT
Start: 2019-02-14 | End: 2019-02-14

## 2019-02-14 RX ORDER — LORAZEPAM 2 MG/ML
1 INJECTION INTRAMUSCULAR ONCE
Status: DISCONTINUED | OUTPATIENT
Start: 2019-02-14 | End: 2019-02-14

## 2019-02-14 RX ORDER — ONDANSETRON 2 MG/ML
INJECTION INTRAMUSCULAR; INTRAVENOUS
Status: COMPLETED
Start: 2019-02-14 | End: 2019-02-14

## 2019-02-14 RX ORDER — OLANZAPINE 10 MG/2ML
5 INJECTION, POWDER, FOR SOLUTION INTRAMUSCULAR DAILY PRN
Status: DISCONTINUED | OUTPATIENT
Start: 2019-02-14 | End: 2019-02-14 | Stop reason: HOSPADM

## 2019-02-14 RX ORDER — ONDANSETRON 2 MG/ML
4 INJECTION INTRAMUSCULAR; INTRAVENOUS ONCE
Status: COMPLETED | OUTPATIENT
Start: 2019-02-14 | End: 2019-02-14

## 2019-02-14 RX ORDER — WATER 10 ML/10ML
INJECTION INTRAMUSCULAR; INTRAVENOUS; SUBCUTANEOUS
Status: DISCONTINUED
Start: 2019-02-14 | End: 2019-02-14 | Stop reason: HOSPADM

## 2019-02-14 RX ORDER — ACETAMINOPHEN 325 MG/1
650 TABLET ORAL EVERY 4 HOURS PRN
Status: DISCONTINUED | OUTPATIENT
Start: 2019-02-14 | End: 2019-02-14 | Stop reason: HOSPADM

## 2019-02-14 RX ADMIN — LORAZEPAM 1 MG: 1 TABLET ORAL at 09:09

## 2019-02-14 RX ADMIN — ONDANSETRON 4 MG: 2 INJECTION INTRAMUSCULAR; INTRAVENOUS at 00:09

## 2019-02-14 RX ADMIN — OLANZAPINE 5 MG: 10 INJECTION, POWDER, FOR SOLUTION INTRAMUSCULAR at 02:00

## 2019-02-14 RX ADMIN — FOLIC ACID: 5 INJECTION, SOLUTION INTRAMUSCULAR; INTRAVENOUS; SUBCUTANEOUS at 02:18

## 2019-02-14 ASSESSMENT — ENCOUNTER SYMPTOMS
SLEEP DISTURBANCE: 1
VOMITING: 1
NAUSEA: 1
NERVOUS/ANXIOUS: 1

## 2019-02-14 NOTE — ED PROVIDER NOTES
Patient was signed out to me awaiting sobriety so he could be evaluated by DEC.  Patient is no longer suicidal, his breathalyzer and his clinical status is that he is clinically sober.  He is starting to have some early withdrawal symptoms with some mild shakes and was given 1 mg of Ativan p.o.  The patient does not need to be admitted for his suicidal ideation that he had last night.  He wants to go to his rehab facility where he will be inpatient to get sober and get treatment for his alcohol abuse.  His relative will be coming to get him in taking him straight there from here.  I am comfortable and do not feel the patient needs to stay and is safe to be discharged.     Ermelinda Polanco MD  02/14/19 0340

## 2019-02-14 NOTE — ED PROVIDER NOTES
"  History     Chief Complaint:  Psych Evaluation    HPI   Jim Richard is a 63 year old male who presents via EMS for a psych evaluation. He reports that he was drinking tonight before going to inpatient treatment at [facility] tomorrow morning. While he was drinking, he reports he became depressed so he took \"double trazodone\" and \"double hydroxyzine\" with the hope that he would not wake up. EMS was called to his home out of concern for these suicidal thoughts, and they presented him to the ED. Here, he reports he was vomiting at home and is still nauseous and dry heaving. He also reports increased anxiety regarding the upcoming treatment; he states this has led to many sleepless nights. In spite of these nerves, he reports he does want to get sober. Of note, he reports his last drink was \"awhile ago\" and he took the medications at least 2 hours prior to his presentation in th ED.     Allergies:  NKDA    Medications:    Atorvastatin  Diazepam  Duloxetine  Breo ellipta inhaler  Hydroxyzine  Mirtazapine  Prilosec  Seroquel  Spironolactone-hydrochlorothiazide  Tamsulosin  Trazodone    Past Medical History:    Alcoholism  Anxiety & Depression  CAD  Esophageal varices w/ bleeding  MI  Hepatomegaly  Hyperlipidemia  HTN  JANIS  COPD  PVD  SDH  Chronic back pain  GI bleed  Alcohol withdrawal    Past Surgical History:    Appendectomy  Femoropopliteal bypass graft  Femoral endarterectomy  EGD, combined x 2   Abdominal hernia repair   Laminectomy & 3+ Level Lumbar Fusion  T&A    Family History:    Mental illness  CAD  Substance abuse  Lung cancer    Social History:  Marital Status:   [4]  Smokes 1.0 PPD  Positive for alcohol use. Comment: 0.75 L vodka/day.   Negative for drug use.      Review of Systems   Gastrointestinal: Positive for nausea and vomiting.   Psychiatric/Behavioral: Positive for self-injury, sleep disturbance and suicidal ideas. The patient is nervous/anxious.    All other systems reviewed and " are negative.      Physical Exam     Patient Vitals for the past 24 hrs:   BP Temp Temp src Pulse SpO2 Weight   19 0116 103/51 -- -- 80 92 % --   19 2333 (!) 156/102 98.8  F (37.1  C) Oral 85 95 % 97.5 kg (215 lb)       Physical Exam  Constitutional: Alert, attentive, GCS 15  HENT:    Nose: Nose normal.    Mouth/Throat: Oropharynx is clear, mucous membranes are moist  Eyes: EOM are normal, anicteric, conjugate gaze  CV: regular rate and rhythm; no murmurs  Chest: Effort normal and breath sounds clear without wheezing or rales, symmetric bilaterally   GI:  non tender. No distension. No guarding or rebound.    MSK: No LE edema, no tenderness to palpation of BLE.  Neurological: Alert, attentive, moving all extremities equally.   Skin: Skin is warm and dry.  Psych: Depressed mood     Emergency Department Course   ECG:  Indication: Ingestion  Time: 0002  Vent. Rate 76 bpm. ME interval 152. QRS duration 150. QT/QTc 450/506. P-R-T axis 59 -28 25.  Normal sinus rhythm. RBBB. Abnormal ECG. No significant change compared to EKG dated 18. Read time: 2.    Laboratory:  BMP: Ca: 8.1 (L), o/w WNL (Creatinine: 0.94)    Acetaminophen level: ,2  Hepatic Panel: Albumin: 3.1 (L), Alkphos: 263 (H), AST: 89 (H), o/w WNL  Lipase: 536 (H)  Alcohol level: 0.33 (HH)    Interventions:  2359 Pepcid, 20 mg, IV injection  0009 Zofran, 4 mg, IV injection  0200 Zyprexa, 5 mg, IM injection  0218 NS w/ Infuvite, 1L, IV      Impression & Plan    Medical Decision Makin-year-old male with recurrent ED visits for alcohol intoxication, presenting for evaluation of reported ethanol use and suicidal ideation for which she reports taking an extra tablet of trazodone and hydroxyzine as he was anxious about going to alcohol treatment later today.  He denies any other ingestion.  EKG shows no evidence of sodium channel blockade, Tylenol level within normal limits.  Patient displayed no evidence of drowsiness with low concern for  significant ingestion at this time.  He did have a mildly elevated lipase, with minimal abdominal pain that resolved with IV Zantac more consistent with gastritis.  Pending clinical sobriety and evaluation by DEC, patient was signed out to Dr. Pepper. Please see his note for brief ED course/Dispo.  I suspect that once patient is sober, he will be safe for discharge with plan for him to go to his inpatient alcohol treatment facility as planned however will wait for DECs imput.     Diagnosis:    ICD-10-CM    1. Alcoholic intoxication without complication (H) F10.920    2. Suicide ideation R45.851        Disposition:  Awaiting DEC assessment pending clinical sobriety    Sebastien Morales MD   [employer]  7:29 AM 02/14/19     Scribe Disclosure:  I, Shannan Holliday, am serving as a scribe on 2/14/2019 at 11:45 AM to personally document services performed by Sebastien Morales MD based on my observations and the provider's statements to me.     Shannan Holliday  2/13/2019    EMERGENCY DEPARTMENT       Sebastien Morales MD  02/14/19 0729

## 2019-02-14 NOTE — ED NOTES
DATE:  2/14/2019   TIME OF RECEIPT FROM LAB:  0039  LAB TEST:  ETOH  LAB VALUE:  0.33  RESULTS GIVEN WITH READ-BACK TO (PROVIDER):  Dr Morales  TIME LAB VALUE REPORTED TO PROVIDER: 0039

## 2019-02-14 NOTE — DISCHARGE INSTRUCTIONS
Go straight to [facility].  Good job on getting help, you are going to make it and do well.

## 2019-02-14 NOTE — PROGRESS NOTES
I had a call transferred to me from the [facility]it was Merissa from [facility]'s  treatment.  Merissa was inquiring about this patients SI issues. I was able to give the medical stability and explained that DEC was to assess this patient.  I informed Merissa that I would get more on this patient and call her back.  She agreed.   I spoke with Mariama NAVARRO  and she was just finishing up with this patient and was happy to call Merissa back.  I also updated the bedside RN on the above.

## 2019-03-01 NOTE — IP AVS SNAPSHOT
Returned patients call and patient scheduled abnormal mammogram follow up at the breast center on 1/24/2019   ` ` Patient Information     Patient Name Sex     Jim Richard ([id number]) Male 1955       Room Bed    31 8831-01      Patient Demographics     Address Phone    [address] [address] [phone] (Home)  none (Work)  [phone] (Mobile) *Preferred*      Patient Ethnicity & Race     Ethnic Group Patient Race    American White      Emergency Contact(s)     Name Relation Home Work Mobile    SarojcarlitaJame constantino Brother [phone]      Documents on File        Status Date Received Description       Documents for the Patient    Insurance Card  02 [employer]    Face Sheet  () 02     Privacy Notice - [place] Received 03     External Medication Information Consent Accepted 16     Patient ID Received 14 Exp: 2015    Consent for Services - Hospital/Clinic Received () 12     Insurance Card Received 13 [employer]    Privacy Notice - [place] Received 12     Consent for EHR Access  13 Copied from existing Consent for services - C/HOD collected on 2012    [facility] Specified Other Received () 16 42 CFR    Consent for Services - Hospital/Clinic Received () 13     Insurance Card Received 13  -     Consent for EHR Access Received 13     Consent to Communicate Received 13     Insurance Card Received 14 BCBS    Consent for Services - Hospital/Clinic       Consent for Services - Hospital/Clinic Received () 14     Consent for EHR Access Received 14     Consent for Services - Informed Received 14     Privacy Notice - [place] Received 14     Consent for Services - Hospital/Clinic  () 14 CONSENT FOR SERVICE    HIM AG Authorization - File Only  14 HIM AG AUTHORIZATION - FILE ONLY    HIM AG Authorization - File Only  14 ONDINA ONOFRE    Consent for Services - Hospital/Clinic  () 14 CONSENT  FOR SERVICE    HIM AG Authorization - File Only  14 [place]    HIM AG Authorization - File Only  14 ALEX RICHARD    HIM AG Authorization  14 [employer] LIFE INS    Advance Directives and Living Will Not Received  Validation of AD 2014    Advance Directives and Living Will Received 10/08/14 Health Care Directive 2014    Privacy Notice - [place] Received 12     Consent for Services/Privacy Notice - Hospital/Clinic Received () 16     Patient ID Received 16     Insurance Card Received 16 [place]    Consent for EHR Access Received 16     HIM AG Authorization - File Only Received 12/15/16 [facility]    HIM AG Authorization - File Only Received 12/15/16 Jame Richard    HIM AG Authorization - File Only Received 12/15/16 [place] [employer]    HIM AG Authorization - File Only Received 12/15/16 [employer]    Insurance Card Received 17 [employer]etna    HIM AG Authorization - File Only Received 17 [facility]    Consent for Services/Privacy Notice - Hospital/Clinic Received () 17     Insurance Card Received 17     Patient ID Received 17     HIM AG Authorization - File Only  17 [facility]    HIM AG Authorization - File Only  17 TILA CALDERON    HIM AG Authorization - File Only  17 AUTHORIZATION TO RELEASE PHI-RELATED TO SUSPECTED MALTREATMENT    HIM AG Authorization - File Only  17 HIM AG AUTHORIZATION-FILE ONLY    Care Everywhere Prospective Auth Received 17     Consent for Services/Privacy Notice - Hospital/Clinic Received 17     Consent for Services - Hospital and Clinic Received 18     HIE Auth Received 18     HIM AG Authorization - File Only  18 AUTHORIZATION TO RELEASE PROTECTED HEALTH INFORMATION       Documents for the Encounter    CMS IM for Patient Signature Received 18     EMS/Ambulance Record  18  [facility]    CMS IM for Patient Signature Received 10/04/18 2 MM      Admission Information     Attending Provider Admitting Provider Admission Type Admission Date/Time    Gray Burns MD McRaith, Joseph Charles, MD Emergency 09/29/18  0547    Discharge Date Hospital Service Auth/Cert Status Service Area     Hospitalist [facility]    Unit Room/Bed Admission Status        88 ONCOLOGY 8831/8831-01 Admission (Confirmed)       Admission     Complaint    GI bleed, GI bleed      Hospital Account     Name Acct ID Class Status Primary Coverage    Jim Richard [id number] Inpatient Open MEDICARE - MEDICARE            Guarantor Account (for Hospital Account #[id number])     Name Relation to Pt Service Area Active? Acct Type    Jim Richard Self FCS Yes Personal/Family    Address Phone          [address] [place] [address] [phone](H)  none(O)              Coverage Information (for Hospital Account #[id number])     1. MEDICARE/MEDICARE     F/O Payor/Plan Precert #    MEDICARE/MEDICARE     Subscriber Subscriber #    Jim Richard [id number]    Address Phone    ATT[employer] CLAIMS  [address]  [place] [address] [phone]          2. COMMERCIAL/CONTINENTAL LIFE MEDICARE SUPPLEMENT     F/O Payor/Plan Precert #    COMMERCIAL/CONTINENTAL LIFE MEDICARE SUPPLEMENT     Subscriber Subscriber #    Jim Richard [id number]    Address Phone    [address] [address]  [place] [address] [phone]             (4) no impairment

## 2019-03-08 ENCOUNTER — MEDICAL CORRESPONDENCE (OUTPATIENT)
Dept: HEALTH INFORMATION MANAGEMENT | Facility: CLINIC | Age: 64
End: 2019-03-08

## 2019-07-03 ENCOUNTER — HOSPITAL ENCOUNTER (EMERGENCY)
Facility: CLINIC | Age: 64
Discharge: HOME | End: 2019-07-03
Attending: EMERGENCY MEDICINE | Admitting: EMERGENCY MEDICINE
Payer: MEDICARE

## 2019-07-03 VITALS
DIASTOLIC BLOOD PRESSURE: 84 MMHG | BODY MASS INDEX: 31.39 KG/M2 | TEMPERATURE: 98.6 F | HEIGHT: 67 IN | OXYGEN SATURATION: 100 % | WEIGHT: 200 LBS | SYSTOLIC BLOOD PRESSURE: 132 MMHG | RESPIRATION RATE: 16 BRPM

## 2019-07-03 VITALS
HEIGHT: 67 IN | RESPIRATION RATE: 18 BRPM | TEMPERATURE: 98.5 F | DIASTOLIC BLOOD PRESSURE: 78 MMHG | OXYGEN SATURATION: 92 % | BODY MASS INDEX: 31.39 KG/M2 | WEIGHT: 200 LBS | SYSTOLIC BLOOD PRESSURE: 130 MMHG

## 2019-07-03 DIAGNOSIS — R18.8 OTHER ASCITES: ICD-10-CM

## 2019-07-03 DIAGNOSIS — R60.0 LOWER EXTREMITY EDEMA: ICD-10-CM

## 2019-07-03 DIAGNOSIS — F10.920 ALCOHOLIC INTOXICATION WITHOUT COMPLICATION (H): ICD-10-CM

## 2019-07-03 LAB
ALBUMIN SERPL-MCNC: 2.6 G/DL (ref 3.4–5)
ALP SERPL-CCNC: 264 U/L (ref 40–150)
ALT SERPL W P-5'-P-CCNC: 55 U/L (ref 0–70)
ANION GAP SERPL CALCULATED.3IONS-SCNC: 8 MMOL/L (ref 3–14)
AST SERPL W P-5'-P-CCNC: 135 U/L (ref 0–45)
BASOPHILS # BLD AUTO: 0 10E9/L (ref 0–0.2)
BASOPHILS NFR BLD AUTO: 1 %
BILIRUB SERPL-MCNC: 1.4 MG/DL (ref 0.2–1.3)
BUN SERPL-MCNC: 15 MG/DL (ref 7–30)
CALCIUM SERPL-MCNC: 7.6 MG/DL (ref 8.5–10.1)
CHLORIDE SERPL-SCNC: 106 MMOL/L (ref 94–109)
CO2 SERPL-SCNC: 26 MMOL/L (ref 20–32)
CREAT SERPL-MCNC: 0.96 MG/DL (ref 0.66–1.25)
DIFFERENTIAL METHOD BLD: ABNORMAL
EOSINOPHIL # BLD AUTO: 0 10E9/L (ref 0–0.7)
EOSINOPHIL NFR BLD AUTO: 1 %
ERYTHROCYTE [DISTWIDTH] IN BLOOD BY AUTOMATED COUNT: 16.3 % (ref 10–15)
GFR SERPL CREATININE-BSD FRML MDRD: 83 ML/MIN/{1.73_M2}
GLUCOSE SERPL-MCNC: 89 MG/DL (ref 70–99)
HCT VFR BLD AUTO: 30.6 % (ref 40–53)
HGB BLD-MCNC: 10.3 G/DL (ref 13.3–17.7)
IMM GRANULOCYTES # BLD: 0 10E9/L (ref 0–0.4)
IMM GRANULOCYTES NFR BLD: 0.2 %
LYMPHOCYTES # BLD AUTO: 0.8 10E9/L (ref 0.8–5.3)
LYMPHOCYTES NFR BLD AUTO: 20.9 %
MCH RBC QN AUTO: 32.5 PG (ref 26.5–33)
MCHC RBC AUTO-ENTMCNC: 33.7 G/DL (ref 31.5–36.5)
MCV RBC AUTO: 97 FL (ref 78–100)
MONOCYTES # BLD AUTO: 0.5 10E9/L (ref 0–1.3)
MONOCYTES NFR BLD AUTO: 13.2 %
NEUTROPHILS # BLD AUTO: 2.6 10E9/L (ref 1.6–8.3)
NEUTROPHILS NFR BLD AUTO: 63.7 %
NRBC # BLD AUTO: 0 10*3/UL
NRBC BLD AUTO-RTO: 0 /100
NT-PROBNP SERPL-MCNC: 132 PG/ML (ref 0–900)
PLATELET # BLD AUTO: 103 10E9/L (ref 150–450)
POTASSIUM SERPL-SCNC: 3.5 MMOL/L (ref 3.4–5.3)
PROT SERPL-MCNC: 6.3 G/DL (ref 6.8–8.8)
RBC # BLD AUTO: 3.17 10E12/L (ref 4.4–5.9)
SODIUM SERPL-SCNC: 140 MMOL/L (ref 133–144)
WBC # BLD AUTO: 4 10E9/L (ref 4–11)

## 2019-07-03 PROCEDURE — 25000132 ZZH RX MED GY IP 250 OP 250 PS 637: Mod: GY | Performed by: EMERGENCY MEDICINE

## 2019-07-03 PROCEDURE — 85025 COMPLETE CBC W/AUTO DIFF WBC: CPT | Performed by: EMERGENCY MEDICINE

## 2019-07-03 PROCEDURE — 80053 COMPREHEN METABOLIC PANEL: CPT | Performed by: EMERGENCY MEDICINE

## 2019-07-03 PROCEDURE — 99284 EMERGENCY DEPT VISIT MOD MDM: CPT | Mod: 25

## 2019-07-03 PROCEDURE — 83880 ASSAY OF NATRIURETIC PEPTIDE: CPT | Performed by: EMERGENCY MEDICINE

## 2019-07-03 PROCEDURE — 76705 ECHO EXAM OF ABDOMEN: CPT

## 2019-07-03 PROCEDURE — 99285 EMERGENCY DEPT VISIT HI MDM: CPT

## 2019-07-03 RX ORDER — FOLIC ACID 1 MG/1
1 TABLET ORAL ONCE
Status: COMPLETED | OUTPATIENT
Start: 2019-07-03 | End: 2019-07-03

## 2019-07-03 RX ORDER — MAGNESIUM OXIDE 400 MG/1
800 TABLET ORAL ONCE
Status: COMPLETED | OUTPATIENT
Start: 2019-07-03 | End: 2019-07-03

## 2019-07-03 RX ORDER — MULTIVITAMIN,THERAPEUTIC
1 TABLET ORAL ONCE
Status: COMPLETED | OUTPATIENT
Start: 2019-07-03 | End: 2019-07-03

## 2019-07-03 RX ORDER — LANOLIN ALCOHOL/MO/W.PET/CERES
100 CREAM (GRAM) TOPICAL ONCE
Status: COMPLETED | OUTPATIENT
Start: 2019-07-03 | End: 2019-07-03

## 2019-07-03 RX ADMIN — THERA TABS 1 TABLET: TAB at 01:27

## 2019-07-03 RX ADMIN — MAGNESIUM OXIDE TAB 400 MG (241.3 MG ELEMENTAL MG) 800 MG: 400 (241.3 MG) TAB at 01:27

## 2019-07-03 RX ADMIN — Medication 100 MG: at 01:27

## 2019-07-03 RX ADMIN — FOLIC ACID 1 MG: 1 TABLET ORAL at 01:27

## 2019-07-03 ASSESSMENT — ENCOUNTER SYMPTOMS
ABDOMINAL DISTENTION: 1
FEVER: 0
WEAKNESS: 1
SHORTNESS OF BREATH: 0
MYALGIAS: 1

## 2019-07-03 ASSESSMENT — MIFFLIN-ST. JEOR
SCORE: 1650.82
SCORE: 1650.82

## 2019-07-03 NOTE — DISCHARGE INSTRUCTIONS
Take 1 extra pill of your spironolactone each afternoon for the next 3 days.  Please try to eat more protein such as a protein shake.  Keep your legs elevated.

## 2019-07-03 NOTE — ED AVS SNAPSHOT
Emergency Department  [address] [place] [address]  Phone:  [phone]  Fax:  [phone]                                    Jim Richard   MRN: [id number]    Department:   Emergency Department   Date of Visit:  7/3/2019           After Visit Summary Signature Page    I have received my discharge instructions, and my questions have been answered. I have discussed any challenges I see with this plan with the nurse or doctor.    ..........................................................................................................................................  Patient/Patient Representative Signature      ..........................................................................................................................................  Patient Representative Print Name and Relationship to Patient    ..................................................               ................................................  Date                                   Time    ..........................................................................................................................................  Reviewed by Signature/Title    ...................................................              ..............................................  Date                                               Time          22EPIC Rev 08/18

## 2019-07-03 NOTE — ED NOTES
Bed: ED19  Expected date: 7/3/19  Expected time: 12:10 AM  Means of arrival: Ambulance  Comments:  HEMS 431 64M leg edema; pain; intoxicated

## 2019-07-03 NOTE — ED AVS SNAPSHOT
Emergency Department  [address] [place] [address]  Phone:  [phone]  Fax:  [phone]                                    Jim Richard   MRN: [id number]    Department:   Emergency Department   Date of Visit:  7/3/2019           After Visit Summary Signature Page    I have received my discharge instructions, and my questions have been answered. I have discussed any challenges I see with this plan with the nurse or doctor.    ..........................................................................................................................................  Patient/Patient Representative Signature      ..........................................................................................................................................  Patient Representative Print Name and Relationship to Patient    ..................................................               ................................................  Date                                   Time    ..........................................................................................................................................  Reviewed by Signature/Title    ...................................................              ..............................................  Date                                               Time          22EPIC Rev 08/18

## 2019-07-03 NOTE — ED TRIAGE NOTES
Pt brought in by ems for b/l leg swelling and pain that has been getting progressively worse over past two weeks. Pt states he drinks a pint of vodka daily. Hx of COPD, esophageal varices.

## 2019-07-03 NOTE — ED PROVIDER NOTES
"  History     Chief Complaint:  Leg swelling    HPI   Jim Richard is a 64 year old male, with a history of COPD, alcohol abuse, CAD, HLD, HTN, MI, who presents to the emergency department for evaluation of bilateral leg swelling. The patient reports he has been experiencing increasing bilateral leg swelling for the past two weeks prior to evaluation. He also notes some abdominal distention. He notes he did have an appointment with his primary care provider on Monday but did not go because he reports he wanted to come to the ED because \"you have better services.\" He reports he drinks a pint of vodka daily and notes his last drink was four hours prior to evaluation. He notes he does not get up and move around much and has not been eating well. He denies any chest pain, shortness of breath, or fever.    Allergies:  No known drug allergies     Medications:    Albuterol  Atorvastatin  Cymbalta  Hydroxyzine  Mirtazapine  Prilosec  Quetiapine  Aldactazide  Tamsulosin  Trazodone    Past Medical History:    Alcohol abuse  Rib fractures  Alcoholic hepatitis  Esophageal varices  HTN  COPD  JANIS  Chronic low back pain  GI bleed  Depression  Chemical dependency  Alcohol withdrawal  Spinal stenosis  Anxiety and depression  PAD  Alcoholism  CAD  MI  Hepatomegaly  HLD  PVD  SDH  Spinal stenosis    Past Surgical History:    Appendectomy  Femoropopliteal bypass graft  Femoral endarterectomy  Hernia repair  Laminectomy  Tonsillectomy  Adenoidectomy    Family History:    Mental illness  CAD  Substance abuse  Lung cancer    Social History:  Smoking status: Current every day smoker of 1.0 ppd  Alcohol use: Yes, liter of vodka daily  The patient presents to the emergency department by himself.  Marital Status:   [4]     Review of Systems   Constitutional: Negative for fever.   Respiratory: Negative for shortness of breath.    Cardiovascular: Positive for leg swelling. Negative for chest pain.   Gastrointestinal: Positive for " "abdominal distention.   All other systems reviewed and are negative.        Physical Exam   Patient Vitals for the past 24 hrs:   BP Temp Temp src Heart Rate Resp SpO2 Height Weight   07/03/19 0014 132/84 98.6  F (37  C) Oral 80 16 100 % 1.702 m (5' 7\") 90.7 kg (200 lb)     Physical Exam  General: Appears well-developed and well-nourished.   Head: No signs of trauma.   CV: Normal rate and regular rhythm.    Resp: Effort normal and breath sounds normal. No respiratory distress.   GI: Distended. There is no tenderness.  No rebound or guarding.  Normal bowel sounds.    MSK: Normal range of motion. 2+ bilateral lower extremity edema.  Neuro: The patient is alert and oriented to person, place, and time.  PERRLA, EOMI, strength in upper/lower extremities normal and symmetrical.   Sensation normal. Speech normal.  GCS 15  Skin: Skin is warm and dry. No rash noted.   Psych: normal mood and affect. behavior is normal.       Emergency Department Course   Imaging:  Radiographic findings were communicated with the patient who voiced understanding of the findings.    POC US Abdomen limited  POC US ABDOMEN LIMITED   Final Result   [facility] Procedure Note       Abd US      PROCEDURE: PERFORMED BY: Dr. Sebastien Gasca   INDICATIONS/SYMPTOM:  Distension   PROBE: Low frequency convex probe   BODY LOCATION: The ultrasound was performed in the abdominal areas.   FINDINGS: Hepatorenal Area:  Positive   Splenorenal Area:  Positive   Pelvic area:  Positive        INTERPRETATION: Abd US showed trace fluid, most prominent in LLQ.     IMAGE DOCUMENTATION: Images were archived to PACs system.               Laboratory:  CBC: HGB 10.3 (L),  (L) o/w WNL (WBC 4.0)  CMP: Calcium 7.6 (L), Bilirubin total 1.4 (H), Albumin 2.6 (L), Protein total 6.3 (L), Alkaline Phosphate 264 (H),  (H) o/w WNL (Creatinine 0.96)  BNP: 132    Interventions:  0127 Multivitamin 1 tablet PO  0127 Folic acid 1 mg PO  0127 Vitamin B1 100 mg " PO  0127 Magnesium oxide 800 mg PO    Emergency Department Course:  Patient arrived to the emergency department via EMS.    Past medical records, nursing notes, and vitals reviewed.  0050: I performed an exam of the patient and obtained history, as documented above.     IV inserted and blood drawn.    0100: I performed a bedside abdominal ultrasound.     0122: I rechecked the patient. Findings and plan explained to the Patient. Patient discharged home with instructions regarding supportive care, medications, and reasons to return. The importance of close follow-up was reviewed.   Impression & Plan    Medical Decision Making:  Jim Richard is a 64-year-old gentleman who presents due to edema to the bilateral lower extremities.  He reports he has had this over the last couple of weeks which prompted him to come to the ER.  My evaluation, he did have edema to the bilateral lower extremities primarily below his knees.  Clinically, I have low suspicion for DVT given the bilateral nature and there is no signs of infection.  The patient is a chronic alcohol user and does have complications such as varices secondary to his alcohol use.  Blood work was obtained that did show findings consistent with alcohol use.  His albumin level was somewhat low and I believe the edema is to alcohol and poor nutritional status.  I did discuss this with the patient and discussed the importance of increasing his protein intake.  Discussed using Boost or Ensure shakes if he did not want to eat.  Also discussed the importance of cutting back and stopping alcohol.  Patient is on spironolactone/HCTZ.  His electrolytes are all appropriate and I felt it would be reasonable to have him take an extra dose of this medication over the next 3 days to help potentially remove some of the excess peripheral edema.  I did do a bedside ultrasound which showed some trace ascites to the abdomen as well.  He was discharged with recommended to follow-up  closely with his primary care doctor for recheck and to return with any worsening symptoms or further concerns.    Diagnosis:    ICD-10-CM   1. Lower extremity edema R60.0   2. Other ascites R18.8     Disposition:  Discharged to home with instructions for follow up.  Tomas Ledesma  7/3/2019    EMERGENCY DEPARTMENT  Scribe Disclosure:  I, Toams Ledesma, am serving as a scribe at 12:50 AM on 7/3/2019 to document services personally performed by Sebastien Gasca MD based on my observations and the provider's statements to me.      Sebastien Gasca MD  07/09/19 0139

## 2019-07-04 NOTE — DISCHARGE INSTRUCTIONS
Discharge Instructions  Alcohol Intoxication    You have been seen today with alcohol intoxication. This means that you have enough alcohol in your system to impair your ability to mentally and physically function, perhaps to the extent that you were unable to care for yourself.    Generally, every Emergency Department visit should have a follow-up clinic visit with either a primary or a specialty clinic/provider. Please follow-up as instructed by your emergency provider today.    You may have come to the Emergency Department because of your intoxication, or for another reason, such as because of an injury. No matter what the case is, this visit is a  red flag  regarding alcohol use, and you should consider whether your drinking pattern is a problem for you.     You may be at risk for alcohol-related problems if:    Men: you drink more than 14 drinks per week, or more than 4 drinks per occasion.    Women: you drink more than 7 drinks per week or more than 3 drinks per occasion.    You have black-outs.  You do things you regret while drinking.  You have legal problems because of drinking.  You have job problems because of drinking (you call in sick to work because of drinking).    CAGE Questions  Have you ever felt you should cut down on your drinking?  Have people annoyed you by criticizing your drinking?  Have you ever felt bad or guilty about your drinking?  Have you ever had a drink first thing in the morning to steady your nerves or get rid of a hangover (eye opener)?    If you answer yes to any of the CAGE questions, you may have a problem with alcohol.      Return to the Emergency Department if:  You become shaky or tremble when you try to stop drinking.   You have severe abdominal pain (belly pain).   You have a seizure or pass out.    You vomit (throw up) blood or have blood in your stool. This may be bright red or it may look like black coffee grounds.  You become lightheaded or faint.      For further  help, contact:   Your caregiver.    Alcoholics Anonymous (AA).    [facility] Intergroup: [phone]  [facility] Central Office: [phone]   A drug or alcohol rehabilitation program.    You can get information on alcohol resources and groups by calling the number 211 or 1-288.159.9899 on any phone.     Seek medical care if:  You have persistent vomiting.   You have persistent pain in any part of your body.    You do not feel better after a few days.    If you were given a prescription for medicine here today, be sure to read all of the information (including the package insert) that comes with your prescription.  This will include important information about the medicine, its side effects, and any warnings that you need to know about.  The pharmacist who fills the prescription can provide more information and answer questions you may have about the medicine.  If you have questions or concerns that the pharmacist cannot address, please call or return to the Emergency Department.   Remember that you can always come back to the Emergency Department if you are not able to see your regular doctor in the amount of time listed above, if you get any new symptoms, or if there is anything that worries you.

## 2019-07-04 NOTE — ED NOTES
Bed: ED17  Expected date: 7/3/19  Expected time: 8:20 PM  Means of arrival: Ambulance  Comments:  HEMS 418 64M intoxicated

## 2019-07-04 NOTE — ED PROVIDER NOTES
History     Chief Complaint:  Alcohol intoxication    HPI   Jim Richard is a 64 year old male with a history of COPD, alcohol abuse with withdrawal, CAD, HLD, HTN, and MI who presents with alcohol intoxication. The patient was evaluated in the ED during the nightshift for bilateral extremity edema likely due to his significant alcohol use. The patient drinks at least a pint of vodka per day. He presents to the ED tonight after he slid out of his chair and called his neighbors for help as he was too weak to get up on his own. The neighbors could not assist him into his chair and called EMS as they believed that the patient was unable to care for himself at home., His BARNEY was 0.28 by breathalyzer per EMS. Here, the patient refuses detox and does not want to quit drinking or go to rehab as he has gone 6 times without success. He is also complaining of bilateral shin pain in addition to the edema. The patient says that he takes his Aldactazide as directed.     Allergies:  No known drug allergies     Medications:    Albuterol  Atorvastatin  Cymbalta  Hydroxyzine  Mirtazapine  Prilosec  Quetiapine  Aldactazide  Tamsulosin  Trazodone     Past Medical History:    Alcohol abuse  Rib fractures  Alcoholic hepatitis  Esophageal varices  HTN  COPD  JANIS  Chronic low back pain  GI bleed  Depression  Chemical dependency  Alcohol withdrawal  Spinal stenosis  Anxiety and depression  PAD  Alcoholism  CAD  MI  Hepatomegaly  HLD  PVD  SDH  Spinal stenosis    Past Surgical History:    Appendectomy  Femoropopliteal bypass graft  Femoral endarterectomy  Hernia repair  Laminectomy  Tonsillectomy  Adenoidectomy    Family History:    Mental illness  Substance abuse  Early onset CAD  Lung cancer     Social History:  Smoking status: Current every day smoker  Alcohol use: Yes  Drug use: No  Patient presents alone.  PCP: Talon Elias    Marital Status:        Review of Systems   Unable to perform ROS: Mental status  "change   Musculoskeletal: Positive for myalgias.   Neurological: Positive for weakness.         Physical Exam     Patient Vitals for the past 24 hrs:   BP Temp Temp src Heart Rate Resp SpO2 Height Weight   07/03/19 2200 -- -- -- -- -- 92 % -- --   07/03/19 2029 130/78 98.5  F (36.9  C) Oral 87 18 92 % -- --   07/03/19 2028 -- -- -- -- 16 -- 1.702 m (5' 7\") 90.7 kg (200 lb)      Physical Exam  General: Smells of alcohol.  Head:  The scalp, face, and head appear normal. No evidence of injury.  Mouth/Throat: Mucus membranes are moist  CV:  Regular rate    Normal S1 and S2  No pathological murmur   Resp:  Breath sounds clear and equal bilaterally    Non-labored, no retractions or accessory muscle use    No coarseness    No wheezing     No crackles  GI:  Abdomen is protuberant without guarding, rebound, or tenderness to   palpation    MS:  Normal motor assessment of all extremities.    Good capillary refill noted.    Bilateral lower extremity pitting edema.   Skin:  No rash or lesions noted.  Neuro:   Speech is normal and fluent. No apparent deficit.  Psych: Awake. Alert.  Normal affect.      Appropriate interactions.     Emergency Department Course     Emergency Department Course:  The patient arrived in the emergency department via EMS on an ADRIANA hold.    2034: I received patient's history from nursing.     Past medical records, nursing notes, and vitals reviewed.  2140: I performed an exam of the patient and obtained history, as documented above.     2249: I rechecked the patient. Findings and plan explained to the Patient. Patient discharged home with instructions regarding supportive care, medications, and reasons to return. The importance of close follow-up was reviewed.      Impression & Plan      Medical Decision Making:  Jim Richard is a 64 year old male who presents for evaluation of alcohol intoxication.  They are intoxicated here in ED by history and exam. Patient has no history of Delirium tremens or " alcohol withdrawal seizures. There are no signs of co-ingestion including acetaminophen, drugs, medications, volatile alcohols.  There are no signs of trauma related to alcohol use and no further workup is needed including head CT.     Sober ride obtained.  Alcohol counseling provided by myself and patient does not want treatment resources.  DEC/SW did not evaluate patient.      Diagnosis:    ICD-10-CM    1. Alcoholic intoxication without complication (H) F10.920      Disposition:  Discharged.     Benitez Moyer  7/3/2019    EMERGENCY DEPARTMENT    Scribe Disclosure:  IBenitez Chi, am serving as a scribe at 9:02 PM on 7/3/2019 to document services personally performed by Nae Sheridan MD based on my observations and the provider's statements to me.        Nae Sheridan MD  07/07/19 5777

## 2019-07-10 ENCOUNTER — APPOINTMENT (OUTPATIENT)
Dept: ULTRASOUND IMAGING | Facility: CLINIC | Age: 64
End: 2019-07-10
Attending: EMERGENCY MEDICINE
Payer: MEDICARE

## 2019-07-10 ENCOUNTER — HOSPITAL ENCOUNTER (EMERGENCY)
Facility: CLINIC | Age: 64
Discharge: HOME | End: 2019-07-10
Attending: EMERGENCY MEDICINE | Admitting: EMERGENCY MEDICINE
Payer: MEDICARE

## 2019-07-10 VITALS
SYSTOLIC BLOOD PRESSURE: 131 MMHG | TEMPERATURE: 98.5 F | DIASTOLIC BLOOD PRESSURE: 67 MMHG | BODY MASS INDEX: 31.39 KG/M2 | RESPIRATION RATE: 19 BRPM | WEIGHT: 200 LBS | HEIGHT: 67 IN | OXYGEN SATURATION: 95 %

## 2019-07-10 DIAGNOSIS — R60.0 BILATERAL LOWER EXTREMITY EDEMA: ICD-10-CM

## 2019-07-10 PROCEDURE — 99284 EMERGENCY DEPT VISIT MOD MDM: CPT | Mod: 25

## 2019-07-10 PROCEDURE — 93970 EXTREMITY STUDY: CPT

## 2019-07-10 ASSESSMENT — ENCOUNTER SYMPTOMS
COLOR CHANGE: 1
FEVER: 1
SHORTNESS OF BREATH: 1

## 2019-07-10 ASSESSMENT — MIFFLIN-ST. JEOR: SCORE: 1650.82

## 2019-07-10 NOTE — ED AVS SNAPSHOT
Emergency Department  [address] [facility] [address]  Phone:  [phone]  Fax:  [phone]                                    Jim Richard   MRN: [id number]    Department:   Emergency Department   Date of Visit:  7/10/2019           After Visit Summary Signature Page    I have received my discharge instructions, and my questions have been answered. I have discussed any challenges I see with this plan with the nurse or doctor.    ..........................................................................................................................................  Patient/Patient Representative Signature      ..........................................................................................................................................  Patient Representative Print Name and Relationship to Patient    ..................................................               ................................................  Date                                   Time    ..........................................................................................................................................  Reviewed by Signature/Title    ...................................................              ..............................................  Date                                               Time          22EPIC Rev 08/18

## 2019-07-11 NOTE — ED PROVIDER NOTES
History     Chief Complaint:  Leg Swelling (Bilateral)    GERRI Richard is a 64 year old male, with a history of tobacco use, alcohol abuse, frequent falling, peripheral vascular disease, heart attack, and COPD, not current anticoagulated, who presents to the ED for evaluation of bilateral leg swelling. The patient has been experiencing this complaint for 2 weeks, for which he has been evaluated with most recent visit on 7/3. He reports that he feels pain in his legs and shins, and it is painful to move, and thus has a gait problem. He is also concerned about the redness and sores on his leg. The patient states that he hasn't been using spironolactone-hydrochlorothiazide. He has intermittent fevers, shortness of breath, and has decreased urine output. He does not report any other physical complaint.        Allergies:  No Known Drug Allergies     Medications:    Albuterol inhaler  Atorvastatin calcium  Valium   Cymbalta  Breo Ellipta  Atarax  Mirtazapine   Thera-vit-m  Prilosec  Quetiapine fumarate  Aldactazide  Tamsulosin  Trazodone    Past Medical History:    Alcoholism  Anxiety  Coronary artery disease  Depression  Esophageal varices with bleeding  Heart attack  Hepatomegaly  Hyperlipidemia  Hypertension  [place] on [facility]  Peripheral vascular disease  Subdural hematoma  Spinal stenosis  Chemical dependency   Fall    Past Surgical History:    Appendectomy  Bypass graft femoropopliteal  Colonoscopy  Endarterectomy femoral  EGD, combined  Hernia repair, abdominal   Laminectomy, fusion lumbar three+ level, combined  Tonsillectomy and adenoidectomy    Family History:    Mother - coronary artery disease  Father - substance abuse, lung cancer  Brother - substance abuse    Social History:  The patient was accompanied to the ED alone.  Smoking Status: Yes  Smokeless Tobacco: No  Alcohol Use: Yes  Drug Use: No   Marital Status:   [4]     Review of Systems   Constitutional: Positive for fever (intermittent).  "  Respiratory: Positive for shortness of breath.    Cardiovascular: Positive for leg swelling.   Genitourinary: Positive for decreased urine volume.   Musculoskeletal: Positive for gait problem.   Skin: Positive for color change.        \"bug bites\" on right leg   All other systems reviewed and are negative.      Physical Exam   Vitals:  Patient Vitals for the past 24 hrs:   BP Temp Temp src Heart Rate Resp SpO2 Height Weight   07/10/19 2135 131/67 -- -- -- 19 95 % -- --   07/10/19 1925 -- -- -- -- -- -- 1.702 m (5' 7\") 90.7 kg (200 lb)   07/10/19 1919 136/87 98.5  F (36.9  C) Oral 89 18 96 % -- --      Physical Exam  Eyes:  The pupils are equal and round    Conjunctivae and sclerae are normal  ENT:    The nose is normal    Pinnae are normal  Neck:  Normal range of motion    There is no rigidity noted    There is no midline cervical spine tenderness    Trachea is in the midline  CV:  Regular rate and rhythm     Edema in bilateral lower extremities  Resp:  Lungs are clear    Non-labored    No rales    No wheezing   GI:  Abdomen is soft, there is no rigidity    Mild distension, no tenderness  MS:  Normal muscular tone    No asymmetric leg swelling  Skin:  No rash or acute skin lesions noted  Neuro:   Awake, alert.      Speech is normal and fluent.    Face is symmetric.     Moves all extremities    Emergency Department Course     Imaging:  Radiology findings were communicated with the patient who voiced understanding of the findings.  US Lower Extremity Venous Duplex Bilateral:   IMPRESSION: No DVT demonstrated.  Reading per radiology.     Emergency Department Course:  Nursing notes and vitals reviewed.  The patient was sent for a US Lower Extremity Venous Duplex Bilateral while in the emergency department, results above.      (1952)   I performed an exam of the patient as documented above. History obtained from patient.     (2152)   Rechecked patient. Updated patient and discussed plan of care. Patient will be " discharged.     I discussed the treatment plan with the patient. They expressed understanding of this plan and consented to discharge. They will be discharged home with instructions for care and follow up. In addition, the patient will return to the emergency department if their symptoms persist, worsen, if new symptoms arise or if there is any concern.  All questions were answered prior to discharge.    Impression & Plan      Medical Decision Making:  Jim Richard is a 64 year old male who presents the emergency department with bilateral lower extremity swelling.  This is been an issue for him.  He also had anterior shins.  He has a history of alcohol abuse and is supposed to be taking medications including spironolactone and hydrochlorothiazide although he admits that he has not been taking any of his medications.  On exam he has some mild distention of his abdomen without tenderness.  He also has edema of his lower extremities.  He has had some work-up for this in the past although no ultrasounds have been ordered.  Given persistent symptoms ultrasound was ordered to evaluate for DVT and was negative.  Suspect his symptoms are related to his lack of his usual diuretic use, limited ability to elevate legs, poor diet and poor liver function resulting in low albumin levels that have been previously documented.  I discussed the importance to him of having a balanced diet, using his scheduled occasions and keeping his legs elevated as much as possible and he verbalized understanding.  I encouraged him to also continue following up with his primary care provider to continue evaluating his conditions and general health maintenance.    Diagnosis:    ICD-10-CM    1. Bilateral lower extremity edema R60.0         Disposition:   Discharged.    Scribe Disclosure:  I, Bekah Chandler, am serving as a scribe at 9:06 PM on 7/10/2019 to document services personally performed by Sami Macario MD, based on my  observations and the provider's statements to me.    Scribe Disclosure:  I, Terrence Selnia, am serving as a scribe at 8:16 PM on 7/10/2019 to document services personally performed by Sami Macario MD  based on my observations and the provider's statements to me.     7/10/2019    EMERGENCY DEPARTMENT       Sami Macario MD  07/10/19 9905

## 2019-07-11 NOTE — ED TRIAGE NOTES
Has been here recently for same complain. Bilateral leg swelling for 2 weeks, frequent falls at home. Drinking alcohol every day.

## 2019-07-11 NOTE — DISCHARGE INSTRUCTIONS
Please restart your medication, the medications will help with your swelling and difficulties urinating. Keep your legs elevated as much as possible. Also try to improve your nutrition and eat plenty of protein.

## 2019-08-01 ENCOUNTER — HOSPITAL ENCOUNTER (INPATIENT)
Facility: CLINIC | Age: 64
LOS: 13 days | Discharge: HOME HEALTH | DRG: 369 | End: 2019-08-14
Attending: EMERGENCY MEDICINE | Admitting: INTERNAL MEDICINE
Payer: MEDICARE

## 2019-08-01 ENCOUNTER — APPOINTMENT (OUTPATIENT)
Dept: GENERAL RADIOLOGY | Facility: CLINIC | Age: 64
DRG: 369 | End: 2019-08-01
Attending: EMERGENCY MEDICINE
Payer: MEDICARE

## 2019-08-01 DIAGNOSIS — K92.2 GASTROINTESTINAL HEMORRHAGE, UNSPECIFIED GASTROINTESTINAL HEMORRHAGE TYPE: ICD-10-CM

## 2019-08-01 DIAGNOSIS — F10.939 ALCOHOL WITHDRAWAL SYNDROME WITH COMPLICATION (H): ICD-10-CM

## 2019-08-01 DIAGNOSIS — R18.8 OTHER ASCITES: ICD-10-CM

## 2019-08-01 DIAGNOSIS — K70.31 ALCOHOLIC CIRRHOSIS OF LIVER WITH ASCITES (H): ICD-10-CM

## 2019-08-01 DIAGNOSIS — R53.81 PHYSICAL DECONDITIONING: ICD-10-CM

## 2019-08-01 DIAGNOSIS — M48.062 SPINAL STENOSIS, LUMBAR REGION, WITH NEUROGENIC CLAUDICATION: Primary | ICD-10-CM

## 2019-08-01 LAB
ABO + RH BLD: NORMAL
ABO + RH BLD: NORMAL
ALBUMIN SERPL-MCNC: 2.3 G/DL (ref 3.4–5)
ALP SERPL-CCNC: 305 U/L (ref 40–150)
ALT SERPL W P-5'-P-CCNC: 53 U/L (ref 0–70)
ANION GAP SERPL CALCULATED.3IONS-SCNC: 10 MMOL/L (ref 3–14)
APTT PPP: 29 SEC (ref 22–37)
AST SERPL W P-5'-P-CCNC: 183 U/L (ref 0–45)
BASOPHILS # BLD AUTO: 0 10E9/L (ref 0–0.2)
BASOPHILS NFR BLD AUTO: 0.3 %
BILIRUB SERPL-MCNC: 8 MG/DL (ref 0.2–1.3)
BLD GP AB SCN SERPL QL: NORMAL
BLD PROD TYP BPU: NORMAL
BLD PROD TYP BPU: NORMAL
BLD UNIT ID BPU: 0
BLOOD BANK CMNT PATIENT-IMP: NORMAL
BLOOD PRODUCT CODE: NORMAL
BPU ID: NORMAL
BUN SERPL-MCNC: 23 MG/DL (ref 7–30)
CALCIUM SERPL-MCNC: 7.9 MG/DL (ref 8.5–10.1)
CHLORIDE SERPL-SCNC: 95 MMOL/L (ref 94–109)
CO2 BLDCOV-SCNC: 26 MMOL/L (ref 21–28)
CO2 SERPL-SCNC: 27 MMOL/L (ref 20–32)
CREAT SERPL-MCNC: 0.93 MG/DL (ref 0.66–1.25)
DIFFERENTIAL METHOD BLD: ABNORMAL
EOSINOPHIL # BLD AUTO: 0 10E9/L (ref 0–0.7)
EOSINOPHIL NFR BLD AUTO: 0.3 %
ERYTHROCYTE [DISTWIDTH] IN BLOOD BY AUTOMATED COUNT: 15.5 % (ref 10–15)
GFR SERPL CREATININE-BSD FRML MDRD: 86 ML/MIN/{1.73_M2}
GLUCOSE SERPL-MCNC: 75 MG/DL (ref 70–99)
HCT VFR BLD AUTO: 25.2 % (ref 40–53)
HEMOCCULT STL QL: NEGATIVE
HGB BLD-MCNC: 8.7 G/DL (ref 13.3–17.7)
IMM GRANULOCYTES # BLD: 0 10E9/L (ref 0–0.4)
IMM GRANULOCYTES NFR BLD: 0.5 %
INR PPP: 1.18 (ref 0.86–1.14)
LACTATE BLD-SCNC: 1.2 MMOL/L (ref 0.7–2.1)
LYMPHOCYTES # BLD AUTO: 0.4 10E9/L (ref 0.8–5.3)
LYMPHOCYTES NFR BLD AUTO: 10.7 %
MCH RBC QN AUTO: 32.7 PG (ref 26.5–33)
MCHC RBC AUTO-ENTMCNC: 34.5 G/DL (ref 31.5–36.5)
MCV RBC AUTO: 95 FL (ref 78–100)
MONOCYTES # BLD AUTO: 0.5 10E9/L (ref 0–1.3)
MONOCYTES NFR BLD AUTO: 13.4 %
NEUTROPHILS # BLD AUTO: 2.9 10E9/L (ref 1.6–8.3)
NEUTROPHILS NFR BLD AUTO: 74.8 %
NRBC # BLD AUTO: 0 10*3/UL
NRBC BLD AUTO-RTO: 0 /100
NUM BPU REQUESTED: 2
PCO2 BLDV: 37 MM HG (ref 40–50)
PH BLDV: 7.44 PH (ref 7.32–7.43)
PLATELET # BLD AUTO: 155 10E9/L (ref 150–450)
PO2 BLDV: 23 MM HG (ref 25–47)
POTASSIUM SERPL-SCNC: 3.8 MMOL/L (ref 3.4–5.3)
PROT SERPL-MCNC: 6.5 G/DL (ref 6.8–8.8)
RBC # BLD AUTO: 2.66 10E12/L (ref 4.4–5.9)
SAO2 % BLDV FROM PO2: 43 %
SODIUM SERPL-SCNC: 132 MMOL/L (ref 133–144)
SPECIMEN EXP DATE BLD: NORMAL
TRANSFUSION STATUS PATIENT QL: NORMAL
TRANSFUSION STATUS PATIENT QL: NORMAL
WBC # BLD AUTO: 3.8 10E9/L (ref 4–11)

## 2019-08-01 PROCEDURE — 86923 COMPATIBILITY TEST ELECTRIC: CPT | Performed by: EMERGENCY MEDICINE

## 2019-08-01 PROCEDURE — 96368 THER/DIAG CONCURRENT INF: CPT

## 2019-08-01 PROCEDURE — 25000132 ZZH RX MED GY IP 250 OP 250 PS 637: Mod: GY | Performed by: INTERNAL MEDICINE

## 2019-08-01 PROCEDURE — 86901 BLOOD TYPING SEROLOGIC RH(D): CPT | Performed by: EMERGENCY MEDICINE

## 2019-08-01 PROCEDURE — 71046 X-RAY EXAM CHEST 2 VIEWS: CPT

## 2019-08-01 PROCEDURE — 25000128 H RX IP 250 OP 636: Performed by: EMERGENCY MEDICINE

## 2019-08-01 PROCEDURE — 83605 ASSAY OF LACTIC ACID: CPT

## 2019-08-01 PROCEDURE — 85730 THROMBOPLASTIN TIME PARTIAL: CPT | Performed by: EMERGENCY MEDICINE

## 2019-08-01 PROCEDURE — 86900 BLOOD TYPING SEROLOGIC ABO: CPT | Performed by: EMERGENCY MEDICINE

## 2019-08-01 PROCEDURE — P9016 RBC LEUKOCYTES REDUCED: HCPCS | Performed by: EMERGENCY MEDICINE

## 2019-08-01 PROCEDURE — 96367 TX/PROPH/DG ADDL SEQ IV INF: CPT

## 2019-08-01 PROCEDURE — 82803 BLOOD GASES ANY COMBINATION: CPT

## 2019-08-01 PROCEDURE — 85025 COMPLETE CBC W/AUTO DIFF WBC: CPT | Performed by: EMERGENCY MEDICINE

## 2019-08-01 PROCEDURE — 96376 TX/PRO/DX INJ SAME DRUG ADON: CPT

## 2019-08-01 PROCEDURE — 25000128 H RX IP 250 OP 636: Performed by: INTERNAL MEDICINE

## 2019-08-01 PROCEDURE — 12000047 ZZH R&B IMCU

## 2019-08-01 PROCEDURE — 25800030 ZZH RX IP 258 OP 636: Performed by: EMERGENCY MEDICINE

## 2019-08-01 PROCEDURE — 99285 EMERGENCY DEPT VISIT HI MDM: CPT | Mod: 25

## 2019-08-01 PROCEDURE — 80053 COMPREHEN METABOLIC PANEL: CPT | Performed by: EMERGENCY MEDICINE

## 2019-08-01 PROCEDURE — 86850 RBC ANTIBODY SCREEN: CPT | Performed by: EMERGENCY MEDICINE

## 2019-08-01 PROCEDURE — 12000000 ZZH R&B MED SURG/OB

## 2019-08-01 PROCEDURE — 82272 OCCULT BLD FECES 1-3 TESTS: CPT | Performed by: EMERGENCY MEDICINE

## 2019-08-01 PROCEDURE — C9113 INJ PANTOPRAZOLE SODIUM, VIA: HCPCS | Performed by: EMERGENCY MEDICINE

## 2019-08-01 PROCEDURE — 96365 THER/PROPH/DIAG IV INF INIT: CPT

## 2019-08-01 PROCEDURE — 99223 1ST HOSP IP/OBS HIGH 75: CPT | Mod: AI | Performed by: INTERNAL MEDICINE

## 2019-08-01 PROCEDURE — 85610 PROTHROMBIN TIME: CPT | Performed by: EMERGENCY MEDICINE

## 2019-08-01 RX ORDER — TAMSULOSIN HYDROCHLORIDE 0.4 MG/1
0.4 CAPSULE ORAL DAILY
Status: DISCONTINUED | OUTPATIENT
Start: 2019-08-02 | End: 2019-08-14 | Stop reason: HOSPADM

## 2019-08-01 RX ORDER — TAMSULOSIN HYDROCHLORIDE 0.4 MG/1
0.4 CAPSULE ORAL DAILY
Status: ON HOLD | COMMUNITY
End: 2019-10-30

## 2019-08-01 RX ORDER — ATORVASTATIN CALCIUM 10 MG/1
10 TABLET, FILM COATED ORAL AT BEDTIME
Status: DISCONTINUED | OUTPATIENT
Start: 2019-08-01 | End: 2019-08-12

## 2019-08-01 RX ORDER — LIDOCAINE 40 MG/G
CREAM TOPICAL
Status: DISCONTINUED | OUTPATIENT
Start: 2019-08-01 | End: 2019-08-01

## 2019-08-01 RX ORDER — MIRTAZAPINE 30 MG/1
30 TABLET, FILM COATED ORAL AT BEDTIME
Status: ON HOLD | COMMUNITY
End: 2019-09-25

## 2019-08-01 RX ORDER — TRAZODONE HYDROCHLORIDE 100 MG/1
200 TABLET ORAL
Status: ON HOLD | COMMUNITY
End: 2019-08-14

## 2019-08-01 RX ORDER — NALOXONE HYDROCHLORIDE 0.4 MG/ML
.1-.4 INJECTION, SOLUTION INTRAMUSCULAR; INTRAVENOUS; SUBCUTANEOUS
Status: DISCONTINUED | OUTPATIENT
Start: 2019-08-01 | End: 2019-08-14 | Stop reason: HOSPADM

## 2019-08-01 RX ORDER — QUETIAPINE FUMARATE 50 MG/1
50 TABLET, FILM COATED ORAL AT BEDTIME
Status: ON HOLD | COMMUNITY
End: 2019-10-30

## 2019-08-01 RX ORDER — LIDOCAINE 40 MG/G
CREAM TOPICAL
Status: DISCONTINUED | OUTPATIENT
Start: 2019-08-01 | End: 2019-08-14 | Stop reason: HOSPADM

## 2019-08-01 RX ORDER — PROCHLORPERAZINE 25 MG
12.5 SUPPOSITORY, RECTAL RECTAL EVERY 12 HOURS PRN
Status: DISCONTINUED | OUTPATIENT
Start: 2019-08-01 | End: 2019-08-14 | Stop reason: HOSPADM

## 2019-08-01 RX ORDER — ONDANSETRON 4 MG/1
4 TABLET, ORALLY DISINTEGRATING ORAL EVERY 6 HOURS PRN
Status: DISCONTINUED | OUTPATIENT
Start: 2019-08-01 | End: 2019-08-14 | Stop reason: HOSPADM

## 2019-08-01 RX ORDER — POLYETHYLENE GLYCOL 3350 17 G/17G
17 POWDER, FOR SOLUTION ORAL DAILY PRN
Status: DISCONTINUED | OUTPATIENT
Start: 2019-08-01 | End: 2019-08-06

## 2019-08-01 RX ORDER — ACETAMINOPHEN 325 MG/1
650 TABLET ORAL EVERY 4 HOURS PRN
Status: DISCONTINUED | OUTPATIENT
Start: 2019-08-01 | End: 2019-08-01

## 2019-08-01 RX ORDER — ACETAMINOPHEN 325 MG/1
650 TABLET ORAL EVERY 4 HOURS PRN
Status: DISCONTINUED | OUTPATIENT
Start: 2019-08-01 | End: 2019-08-14 | Stop reason: HOSPADM

## 2019-08-01 RX ORDER — NITROGLYCERIN 0.4 MG/1
0.4 TABLET SUBLINGUAL EVERY 5 MIN PRN
Status: DISCONTINUED | OUTPATIENT
Start: 2019-08-01 | End: 2019-08-07

## 2019-08-01 RX ORDER — LORAZEPAM 2 MG/ML
1-2 INJECTION INTRAMUSCULAR EVERY 30 MIN PRN
Status: DISCONTINUED | OUTPATIENT
Start: 2019-08-01 | End: 2019-08-08

## 2019-08-01 RX ORDER — PROCHLORPERAZINE MALEATE 5 MG
5 TABLET ORAL EVERY 6 HOURS PRN
Status: DISCONTINUED | OUTPATIENT
Start: 2019-08-01 | End: 2019-08-14 | Stop reason: HOSPADM

## 2019-08-01 RX ORDER — OMEPRAZOLE 40 MG/1
40 CAPSULE, DELAYED RELEASE ORAL DAILY
Status: ON HOLD | COMMUNITY
End: 2019-08-14

## 2019-08-01 RX ORDER — HYDROXYZINE HYDROCHLORIDE 25 MG/1
25 TABLET, FILM COATED ORAL
Status: DISCONTINUED | OUTPATIENT
Start: 2019-08-01 | End: 2019-08-08

## 2019-08-01 RX ORDER — SPIRONOLACTONE AND HYDROCHLOROTHIAZIDE 25; 25 MG/1; MG/1
1 TABLET ORAL DAILY
Status: ON HOLD | COMMUNITY
End: 2019-08-14

## 2019-08-01 RX ORDER — LANOLIN ALCOHOL/MO/W.PET/CERES
100 CREAM (GRAM) TOPICAL DAILY
Status: COMPLETED | OUTPATIENT
Start: 2019-08-01 | End: 2019-08-05

## 2019-08-01 RX ORDER — MIRTAZAPINE 30 MG/1
30 TABLET, FILM COATED ORAL AT BEDTIME
Status: DISCONTINUED | OUTPATIENT
Start: 2019-08-01 | End: 2019-08-14 | Stop reason: HOSPADM

## 2019-08-01 RX ORDER — ATORVASTATIN CALCIUM 10 MG/1
10 TABLET, FILM COATED ORAL AT BEDTIME
Status: ON HOLD | COMMUNITY
End: 2019-08-14

## 2019-08-01 RX ORDER — BISACODYL 10 MG
10 SUPPOSITORY, RECTAL RECTAL DAILY PRN
Status: DISCONTINUED | OUTPATIENT
Start: 2019-08-01 | End: 2019-08-14 | Stop reason: HOSPADM

## 2019-08-01 RX ORDER — ONDANSETRON 2 MG/ML
4 INJECTION INTRAMUSCULAR; INTRAVENOUS EVERY 6 HOURS PRN
Status: DISCONTINUED | OUTPATIENT
Start: 2019-08-01 | End: 2019-08-14 | Stop reason: HOSPADM

## 2019-08-01 RX ORDER — OCTREOTIDE ACETATE 50 UG/ML
50 INJECTION, SOLUTION INTRAVENOUS; SUBCUTANEOUS ONCE
Status: COMPLETED | OUTPATIENT
Start: 2019-08-01 | End: 2019-08-01

## 2019-08-01 RX ORDER — CEFTRIAXONE 2 G/1
2 INJECTION, POWDER, FOR SOLUTION INTRAMUSCULAR; INTRAVENOUS ONCE
Status: COMPLETED | OUTPATIENT
Start: 2019-08-01 | End: 2019-08-01

## 2019-08-01 RX ORDER — FOLIC ACID 1 MG/1
1 TABLET ORAL DAILY
Status: DISCONTINUED | OUTPATIENT
Start: 2019-08-01 | End: 2019-08-14 | Stop reason: HOSPADM

## 2019-08-01 RX ORDER — MULTIPLE VITAMINS W/ MINERALS TAB 9MG-400MCG
1 TAB ORAL DAILY
Status: DISCONTINUED | OUTPATIENT
Start: 2019-08-02 | End: 2019-08-14 | Stop reason: HOSPADM

## 2019-08-01 RX ORDER — LORAZEPAM 0.5 MG/1
1-2 TABLET ORAL EVERY 30 MIN PRN
Status: DISCONTINUED | OUTPATIENT
Start: 2019-08-01 | End: 2019-08-08

## 2019-08-01 RX ORDER — CEFTRIAXONE 2 G/1
2 INJECTION, POWDER, FOR SOLUTION INTRAMUSCULAR; INTRAVENOUS EVERY 24 HOURS
Status: COMPLETED | OUTPATIENT
Start: 2019-08-02 | End: 2019-08-07

## 2019-08-01 RX ORDER — MULTIPLE VITAMINS W/ MINERALS TAB 9MG-400MCG
1 TAB ORAL DAILY
Status: DISCONTINUED | OUTPATIENT
Start: 2019-08-01 | End: 2019-08-01

## 2019-08-01 RX ORDER — QUETIAPINE FUMARATE 50 MG/1
50 TABLET, FILM COATED ORAL 3 TIMES DAILY PRN
Status: DISCONTINUED | OUTPATIENT
Start: 2019-08-01 | End: 2019-08-14 | Stop reason: HOSPADM

## 2019-08-01 RX ORDER — ALBUTEROL SULFATE 90 UG/1
2 AEROSOL, METERED RESPIRATORY (INHALATION) EVERY 6 HOURS PRN
Status: DISCONTINUED | OUTPATIENT
Start: 2019-08-01 | End: 2019-08-14 | Stop reason: HOSPADM

## 2019-08-01 RX ORDER — DULOXETIN HYDROCHLORIDE 60 MG/1
60 CAPSULE, DELAYED RELEASE ORAL DAILY
Status: DISCONTINUED | OUTPATIENT
Start: 2019-08-02 | End: 2019-08-14 | Stop reason: HOSPADM

## 2019-08-01 RX ORDER — HYDROXYZINE HYDROCHLORIDE 25 MG/1
25 TABLET, FILM COATED ORAL
Status: ON HOLD | COMMUNITY
End: 2019-08-14

## 2019-08-01 RX ORDER — HYDROMORPHONE HYDROCHLORIDE 1 MG/ML
0.3 INJECTION, SOLUTION INTRAMUSCULAR; INTRAVENOUS; SUBCUTANEOUS
Status: DISCONTINUED | OUTPATIENT
Start: 2019-08-01 | End: 2019-08-14 | Stop reason: HOSPADM

## 2019-08-01 RX ADMIN — Medication 100 MG: at 22:27

## 2019-08-01 RX ADMIN — SODIUM CHLORIDE 8 MG/HR: 9 INJECTION, SOLUTION INTRAVENOUS at 19:02

## 2019-08-01 RX ADMIN — CEFTRIAXONE SODIUM 2 G: 2 INJECTION, POWDER, FOR SOLUTION INTRAMUSCULAR; INTRAVENOUS at 18:25

## 2019-08-01 RX ADMIN — TRAZODONE HYDROCHLORIDE 150 MG: 100 TABLET ORAL at 22:27

## 2019-08-01 RX ADMIN — HYDROMORPHONE HYDROCHLORIDE 0.3 MG: 1 INJECTION, SOLUTION INTRAMUSCULAR; INTRAVENOUS; SUBCUTANEOUS at 22:27

## 2019-08-01 RX ADMIN — FOLIC ACID 1 MG: 1 TABLET ORAL at 22:27

## 2019-08-01 RX ADMIN — OCTREOTIDE ACETATE 50 MCG: 50 INJECTION, SOLUTION INTRAVENOUS; SUBCUTANEOUS at 18:48

## 2019-08-01 RX ADMIN — MIRTAZAPINE 30 MG: 30 TABLET, FILM COATED ORAL at 22:27

## 2019-08-01 RX ADMIN — ATORVASTATIN CALCIUM 10 MG: 10 TABLET, FILM COATED ORAL at 22:27

## 2019-08-01 RX ADMIN — SODIUM CHLORIDE 80 MG: 9 INJECTION, SOLUTION INTRAVENOUS at 18:45

## 2019-08-01 RX ADMIN — OCTREOTIDE ACETATE 50 MCG/HR: 200 INJECTION, SOLUTION INTRAVENOUS; SUBCUTANEOUS at 18:47

## 2019-08-01 ASSESSMENT — ENCOUNTER SYMPTOMS
VOMITING: 0
WEAKNESS: 1
ABDOMINAL PAIN: 1

## 2019-08-01 NOTE — ED NOTES
"[facility]  ED Nurse Handoff Report    ED Chief complaint: Rectal Bleeding; Abdominal Pain; Leg Swelling; and Fall      ED Diagnosis:   Final diagnoses:   Gastrointestinal hemorrhage, unspecified gastrointestinal hemorrhage type       Code Status: Full Code    Allergies: No Known Allergies    Activity level - Baseline/Home:  Independent  Activity Level - Current:   Independent    Patient's Preferred language: English   Needed?: No    Isolation: No  Infection: Not Applicable  Bariatric?: No    Vital Signs:   Vitals:    08/01/19 1618   BP: (!) 140/89   Pulse: 91   Resp: 18   Temp: 98.7  F (37.1  C)   TempSrc: Oral   SpO2: 94%   Weight: 90.7 kg (200 lb)       Cardiac Rhythm: ,        Pain level:      Is this patient confused?: No   Does this patient have a guardian?  No         If yes, is there guardianship documents in the Epic \"Code/ACP\" activity?  N/A         Guardian Notified?  N/A  [place] - Suicide Severity Rating Scale Completed?  Yes  If yes, what color did the patient score?  White    Patient Report: Initial Complaint: Rectal bleed, diarrhea, abd distention  Focused Assessment: Pt was seen at PCP last Thursday, PCP told pt that LFTs were elevated. Pt states he stopped drinking ETOH last Thursday due to tests. Pt states started with black diarrhea stools around Saturday and started to get weak. Pt did have a fall on Sunday injuring his ribs. Pt had drop in Hgb since last week. Pt to be admitted.   Tests Performed: labs, CXR  Abnormal Results:   Labs Ordered and Resulted from Time of ED Arrival Up to the Time of Departure from the ED   CBC WITH PLATELETS DIFFERENTIAL - Abnormal; Notable for the following components:       Result Value    WBC 3.8 (*)     RBC Count 2.66 (*)     Hemoglobin 8.7 (*)     Hematocrit 25.2 (*)     RDW 15.5 (*)     Absolute Lymphocytes 0.4 (*)     All other components within normal limits   INR - Abnormal; Notable for the following components:    INR 1.18 (*)  "    All other components within normal limits   ISTAT  GASES LACTATE JESSICA POCT - Abnormal; Notable for the following components:    Ph Venous 7.44 (*)     PCO2 Venous 37 (*)     PO2 Venous 23 (*)     All other components within normal limits   COMPREHENSIVE METABOLIC PANEL   PARTIAL THROMBOPLASTIN TIME   OCCULT BLOOD STOOL   ISTAT GAS OR ELECTROLYTE NURSING POCT   ABO/RH TYPE AND SCREEN       Treatments provided: fluids    Family Comments: none    OBS brochure/video discussed/provided to patient/family: N/A              Name of person given brochure if not patient: NA              Relationship to patient: Na    ED Medications: Medications - No data to display    Drips infusing?:  No    For the majority of the shift this patient was Green.   Interventions performed were none.    Severe Sepsis OR Septic Shock Diagnosis Present: No    To be done/followed up on inpatient unit:  monitor, blood?    ED NURSE PHONE NUMBER: [phone]

## 2019-08-01 NOTE — PHARMACY-ADMISSION MEDICATION HISTORY
Admission medication history interview status for the 8/1/2019  admission is complete. See EPIC admission navigator for prior to admission medications     Medication history source reliability:Good    Actions taken by pharmacist (provider contacted, etc): Called pt's pharmacy to verify spironolactone-hydrochlorothiazide dose.      Additional medication history information not noted on PTA med list :None    Medication reconciliation/reorder completed by provider prior to medication history? No    Time spent in this activity: 10 minutes    Prior to Admission medications    Medication Sig Last Dose Taking? Auth Provider   albuterol (PROAIR HFA/PROVENTIL HFA/VENTOLIN HFA) 108 (90 BASE) MCG/ACT Inhaler Inhale 2 puffs into the lungs every 6 hours as needed  prn at prn Yes Unknown, Entered By History   atorvastatin (LIPITOR) 10 MG tablet Take 10 mg by mouth At Bedtime 7/31/2019 at pm Yes Unknown, Entered By History   DULoxetine (CYMBALTA) 60 MG EC capsule Take 1 capsule (60 mg) by mouth daily 8/1/2019 at am Yes Brody Rand MD   fluticasone-vilanterol (BREO ELLIPTA) 200-25 MCG/INH oral inhaler Inhale 1 puff into the lungs daily  8/1/2019 at am Yes Unknown, Entered By History   hydrOXYzine (ATARAX) 25 MG tablet Take 25 mg by mouth nightly as needed (sleep) 7/31/2019 at pm Yes Unknown, Entered By History   mirtazapine (REMERON) 30 MG tablet Take 30 mg by mouth At Bedtime 7/31/2019 at pm Yes Unknown, Entered By History   multivitamin, therapeutic with minerals (THERA-VIT-M) TABS tablet Take 1 tablet by mouth daily 8/1/2019 at am Yes Jude Duarte DO   omeprazole (PRILOSEC) 40 MG DR capsule Take 40 mg by mouth daily 8/1/2019 at am Yes Unknown, Entered By History   QUEtiapine (SEROQUEL) 50 MG tablet Take 50 mg by mouth 3 times daily as needed (anxiety/sleep) prn at prn Yes Unknown, Entered By History   spironolactone-HCTZ (ALDACTAZIDE) 25-25 MG tablet Take 1 tablet by mouth daily 8/1/2019 at am Yes Unknown,  Entered By History   tamsulosin (FLOMAX) 0.4 MG capsule Take 0.4 mg by mouth daily 8/1/2019 at am Yes Unknown, Entered By History   traZODone (DESYREL) 100 MG tablet Take 200 mg by mouth nightly as needed for sleep prn at prn Yes Unknown, Entered By History     Zamzam Mckeon, PharmD

## 2019-08-01 NOTE — ED NOTES
Bed: ED21  Expected date:   Expected time:   Means of arrival:   Comments:  seaview - 64 M GI bleed eta 1616

## 2019-08-01 NOTE — ED PROVIDER NOTES
History     Chief Complaint:    Rectal Bleeding; Abdominal Pain; Leg Swelling; and Fall      The history is provided by the patient.      Jim Richard is a 64 year old male with a history of varices who presents to the emergency department today with rectal bleeding, abdominal pain, leg swelling and a fall. The patient states that he has been having dark, runny stools that started 5 days prior to visit. He states that he has had these stools a couple times a day. He states that he feels weak with pain and swelling to his abdomen and legs. He denies vomiting blood, taking Advil, Asprin, Aleve or Naproxen. Due to his varices he states he was told to stop drinking daily and he hasn't consumes alcohol for a week and said he went through shaking withdrawals. He states his varices were scoped, but not banded.     Allergies:  Amlodipine  Lisinopril    Medications:    Albuterol  Lipitor  Cymbalta  Breo ellipta  Atarax  Remeron  Prilosec  Seroquel  Aldactazide  Flomax  Desyrel  Vistaril  Campral  Remeron    Past Medical History:    Acute blood loss anemia  Alcohol withdrawal   Alcoholic hepatitis without ascites  Alcoholism   Anxiety and depression  Chemical dependency   Chronic low back pain  COPD (chronic obstructive pulmonary disease)   Coronary artery disease  Depression  Esophageal varices  Essential hypertension  GI bleed  Heart attack   Hematemesis  Hepatomegaly  Hyperlipemia  Hypertension  [facility]  JANIS on CPAP  PAD (peripheral artery disease)   Peripheral vascular disease   Rib Fractures (right 7th, 8th, 9th)   SDH (subdural hematoma)   Smoker  Spinal stenosis  Suicidal ideation  Sleep apnea  Tremor    Past Surgical History:    Appendectomy  Bypass graft femoropopliteal  EGD  Hernia repair  Lumbar fusion  Tonsillectomy    Family History:    Mother - CAD, lymphoma cancer, CHF  Father - substance abuse, lung cancer  Brother - substance abuse    Social History:  The patient was alone.  Smoking Status:  Current, pack a day  Smokeless Tobacco: Never  Alcohol Use: Yes, 3/4th liter vodka daily    Marital Status:       Review of Systems   Cardiovascular: Positive for leg swelling.   Gastrointestinal: Positive for abdominal pain. Negative for vomiting.        Dark, runny stool   Neurological: Positive for weakness.   All other systems reviewed and are negative.        Physical Exam   First Vitals:  BP: (!) 140/89  Pulse: 91  Heart Rate: 91  Temp: 98.7  F (37.1  C)  Resp: 18  Weight: 90.7 kg (200 lb)  SpO2: 94 %      Physical Exam  Vitals: reviewed by me  General: Pt seen on [facility], cooperative, and alert to conversation, pale appearing, chronically ill-appearing.  Eyes: Tracking well, clear conjunctiva BL  ENT: MMM, midline trachea.   Lungs:   No tachypnea, no accessory muscle use. No respiratory distress.   CV: Rate as above, regular rhythm.    Abd: Soft, non tender, no guarding, no rebound. Non distended.  Distended.  Digital rectal exam shows soft brown stool.  MSK: no peripheral edema or joint effusion.  No evidence of trauma  Skin: No rash, normal turgor and temperature  Neuro: Clear speech and no facial droop.  Psych: Not RIS, no e/o AH/VH      Emergency Department Course     Imaging:  Radiographic findings were communicated with the patient who voiced understanding of the findings.    XR Chest 2 views:   IMPRESSION:   1. Multiple chronic fractures bilateral ribs are again noted.  Refracture through the old rib fracture sites is difficult to exclude  although no definite new fracture is seen.  2. Left basilar atelectasis versus scarring.  3. No other evidence of acute cardiopulmonary disease is seen, as per radiology.    Laboratory:  PTT: 29  INR: 1.18 (H)  CMP: Glucose: 75, NA: 132 (L), calcium: 7.9 (L), Bilirubin: 8.0 (H), Albumin: 2.3 (L), Protein: 6.5 (L), AST: 183 (H) , o/w WNL   CBC: WBC: 3.8, HGB: 8.7, PLT: 155  1630 Blood: neg  ABO/Rh: A, neg  ISTAT VBG: pH 7.44 (H) / PCO2 37 (L)  / PO2 23 (L) / Bicarb 26 / O2 sat 43 / lactic acid 1.2  Occult blood stool: neg    Interventions:  1845 Protonix 80 mg IV  1847 Octreotide 50 mcg/hr IV  1848 Octreotide 50 mcg  1902 Protonix 8 mg/hr IV  1825 Rocephin 2 g IV    Emergency Department Course:  Nursing notes and vitals reviewed. (1647) I performed an exam of the patient as documented above.     IV inserted. Medicine administered as documented above. Blood drawn. This was sent to the lab for further testing, results above.     The patient was sent for a chest XR while in the emergency department, findings above.       1821  I consulted with Dr. Chinchilla of the hospitalist services. They are in agreement to accept the patient for admission.    1955 I rechecked the patient and discussed the results of his workup thus far.       Findings and plan explained to the Patient who consents to admission. Discussed the patient with Dr. Chinchilla, who will admit the patient to a [facility] bed for further monitoring, evaluation, and treatment.         Impression & Plan      Medical Decision Making:  Jim Richard is a 64 year old male who presents to Emergency Room with appears to be a GI bleed. I appreciate no melena right now, but his hemoglobin has dropped and he has a concerning history with varices documented by EGD previously. His has a benign abdominal exam here apart from ascites, and I do suspect he has liver failure from his alcohol use. After speaking with Dr. Chinchilla who generously agreed to accept care of the patient, I did start Protonix and Octreotide infusions as well as antibiotics. Patient will be scoped tomorrow is my hope, and will be monitored very carefully. Hemoglobin above 8, no active bleeding, soft brown stool here on my exam, and patient had normal vital signs. No need to transfuse as of yet, may need to be transfused up stairs if things change. Dr. Chinchilla is aware and generously agrees to accept care of the patient.      Diagnosis:    ICD-10-CM    1.  Gastrointestinal hemorrhage, unspecified gastrointestinal hemorrhage type K92.2 Comprehensive metabolic panel     ABO/Rh type and screen     Occult blood stool     Blood component     Blood component     Blood component     Blood component     CANCELED: ABO/Rh type and screen       Disposition:  Admitted to Dr. Renée Atkinsonibkun Disclosure:  I, Mark Lorenzo, am serving as a scribe at 10:13 PM on 8/1/2019 to document services personally performed by Cali Milan MD based on my observations and the provider's statements to me.     8/1/2019    EMERGENCY DEPARTMENT       Arden Milan MD  08/01/19 8723

## 2019-08-01 NOTE — ED TRIAGE NOTES
Hx of alcohoism. Pt noticed black stools over the weekend after pt stopped drinking ETOH last Thursday. Pt did have a fall on Sunday injuring ribs. PCP told pt that his LFTs were high which made pt stop drinking

## 2019-08-02 ENCOUNTER — APPOINTMENT (OUTPATIENT)
Dept: ULTRASOUND IMAGING | Facility: CLINIC | Age: 64
DRG: 369 | End: 2019-08-02
Attending: INTERNAL MEDICINE
Payer: MEDICARE

## 2019-08-02 LAB
ALBUMIN FLD-MCNC: 0.6 G/DL
ALBUMIN SERPL-MCNC: 2.2 G/DL (ref 3.4–5)
ALP SERPL-CCNC: 281 U/L (ref 40–150)
ALT SERPL W P-5'-P-CCNC: 42 U/L (ref 0–70)
ANION GAP SERPL CALCULATED.3IONS-SCNC: 10 MMOL/L (ref 3–14)
APPEARANCE FLD: CLEAR
AST SERPL W P-5'-P-CCNC: 131 U/L (ref 0–45)
BILIRUB SERPL-MCNC: 6.3 MG/DL (ref 0.2–1.3)
BLD PROD TYP BPU: NORMAL
BLD UNIT ID BPU: 0
BLOOD PRODUCT CODE: NORMAL
BPU ID: NORMAL
BUN SERPL-MCNC: 22 MG/DL (ref 7–30)
CALCIUM SERPL-MCNC: 7.8 MG/DL (ref 8.5–10.1)
CHLORIDE SERPL-SCNC: 97 MMOL/L (ref 94–109)
CO2 SERPL-SCNC: 26 MMOL/L (ref 20–32)
COLOR FLD: YELLOW
CREAT SERPL-MCNC: 1.05 MG/DL (ref 0.66–1.25)
ERYTHROCYTE [DISTWIDTH] IN BLOOD BY AUTOMATED COUNT: 17.9 % (ref 10–15)
GFR SERPL CREATININE-BSD FRML MDRD: 74 ML/MIN/{1.73_M2}
GLUCOSE SERPL-MCNC: 85 MG/DL (ref 70–99)
HCT VFR BLD AUTO: 28.7 % (ref 40–53)
HGB BLD-MCNC: 10.1 G/DL (ref 13.3–17.7)
HGB BLD-MCNC: 9.7 G/DL (ref 13.3–17.7)
HGB BLD-MCNC: 9.8 G/DL (ref 13.3–17.7)
LYMPHOCYTES NFR FLD MANUAL: 47 %
MAGNESIUM SERPL-MCNC: 1 MG/DL (ref 1.6–2.3)
MAGNESIUM SERPL-MCNC: 2.1 MG/DL (ref 1.6–2.3)
MCH RBC QN AUTO: 30.7 PG (ref 26.5–33)
MCHC RBC AUTO-ENTMCNC: 33.8 G/DL (ref 31.5–36.5)
MCV RBC AUTO: 91 FL (ref 78–100)
MONOS+MACROS NFR FLD MANUAL: 17 %
NEUTS BAND NFR FLD MANUAL: 14 %
OTHER CELLS FLD MANUAL: 22 %
PHOSPHATE SERPL-MCNC: 2.4 MG/DL (ref 2.5–4.5)
PLATELET # BLD AUTO: 130 10E9/L (ref 150–450)
POTASSIUM SERPL-SCNC: 3.2 MMOL/L (ref 3.4–5.3)
POTASSIUM SERPL-SCNC: 3.8 MMOL/L (ref 3.4–5.3)
PROT FLD-MCNC: 1.4 G/DL
PROT SERPL-MCNC: 5.9 G/DL (ref 6.8–8.8)
RBC # BLD AUTO: 3.16 10E12/L (ref 4.4–5.9)
SODIUM SERPL-SCNC: 133 MMOL/L (ref 133–144)
SPECIMEN SOURCE FLD: NORMAL
TRANSFUSION STATUS PATIENT QL: NORMAL
TRANSFUSION STATUS PATIENT QL: NORMAL
UPPER GI ENDOSCOPY: NORMAL
WBC # BLD AUTO: 3.5 10E9/L (ref 4–11)
WBC # FLD AUTO: NORMAL /UL

## 2019-08-02 PROCEDURE — 25800030 ZZH RX IP 258 OP 636: Performed by: INTERNAL MEDICINE

## 2019-08-02 PROCEDURE — 12000047 ZZH R&B IMCU

## 2019-08-02 PROCEDURE — 82042 OTHER SOURCE ALBUMIN QUAN EA: CPT | Performed by: INTERNAL MEDICINE

## 2019-08-02 PROCEDURE — P9016 RBC LEUKOCYTES REDUCED: HCPCS | Performed by: EMERGENCY MEDICINE

## 2019-08-02 PROCEDURE — 40000863 ZZH STATISTIC RADIOLOGY XRAY, US, CT, MAR, NM

## 2019-08-02 PROCEDURE — 80053 COMPREHEN METABOLIC PANEL: CPT | Performed by: INTERNAL MEDICINE

## 2019-08-02 PROCEDURE — 36415 COLL VENOUS BLD VENIPUNCTURE: CPT | Performed by: INTERNAL MEDICINE

## 2019-08-02 PROCEDURE — 84132 ASSAY OF SERUM POTASSIUM: CPT | Performed by: HOSPITALIST

## 2019-08-02 PROCEDURE — 27210190 US PARACENTESIS

## 2019-08-02 PROCEDURE — 36415 COLL VENOUS BLD VENIPUNCTURE: CPT | Performed by: HOSPITALIST

## 2019-08-02 PROCEDURE — G0500 MOD SEDAT ENDO SERVICE >5YRS: HCPCS | Performed by: INTERNAL MEDICINE

## 2019-08-02 PROCEDURE — 93010 ELECTROCARDIOGRAM REPORT: CPT | Performed by: INTERNAL MEDICINE

## 2019-08-02 PROCEDURE — C9113 INJ PANTOPRAZOLE SODIUM, VIA: HCPCS | Performed by: INTERNAL MEDICINE

## 2019-08-02 PROCEDURE — 25000125 ZZHC RX 250: Performed by: RADIOLOGY

## 2019-08-02 PROCEDURE — 25000128 H RX IP 250 OP 636: Performed by: INTERNAL MEDICINE

## 2019-08-02 PROCEDURE — 0DJ08ZZ INSPECTION OF UPPER INTESTINAL TRACT, VIA NATURAL OR ARTIFICIAL OPENING ENDOSCOPIC: ICD-10-PCS | Performed by: INTERNAL MEDICINE

## 2019-08-02 PROCEDURE — 85018 HEMOGLOBIN: CPT | Performed by: INTERNAL MEDICINE

## 2019-08-02 PROCEDURE — 25000132 ZZH RX MED GY IP 250 OP 250 PS 637: Mod: GY | Performed by: INTERNAL MEDICINE

## 2019-08-02 PROCEDURE — 12000000 ZZH R&B MED SURG/OB

## 2019-08-02 PROCEDURE — 93005 ELECTROCARDIOGRAM TRACING: CPT

## 2019-08-02 PROCEDURE — 25000125 ZZHC RX 250: Performed by: INTERNAL MEDICINE

## 2019-08-02 PROCEDURE — 87070 CULTURE OTHR SPECIMN AEROBIC: CPT | Performed by: INTERNAL MEDICINE

## 2019-08-02 PROCEDURE — 25000132 ZZH RX MED GY IP 250 OP 250 PS 637: Mod: GY | Performed by: HOSPITALIST

## 2019-08-02 PROCEDURE — 83735 ASSAY OF MAGNESIUM: CPT | Performed by: INTERNAL MEDICINE

## 2019-08-02 PROCEDURE — 89051 BODY FLUID CELL COUNT: CPT | Performed by: INTERNAL MEDICINE

## 2019-08-02 PROCEDURE — 99233 SBSQ HOSP IP/OBS HIGH 50: CPT | Performed by: HOSPITALIST

## 2019-08-02 PROCEDURE — 25000125 ZZHC RX 250: Performed by: HOSPITALIST

## 2019-08-02 PROCEDURE — 25000128 H RX IP 250 OP 636: Performed by: HOSPITALIST

## 2019-08-02 PROCEDURE — 84100 ASSAY OF PHOSPHORUS: CPT | Performed by: INTERNAL MEDICINE

## 2019-08-02 PROCEDURE — 83735 ASSAY OF MAGNESIUM: CPT | Performed by: HOSPITALIST

## 2019-08-02 PROCEDURE — 84157 ASSAY OF PROTEIN OTHER: CPT | Performed by: INTERNAL MEDICINE

## 2019-08-02 PROCEDURE — 43235 EGD DIAGNOSTIC BRUSH WASH: CPT | Performed by: INTERNAL MEDICINE

## 2019-08-02 PROCEDURE — 85027 COMPLETE CBC AUTOMATED: CPT | Performed by: INTERNAL MEDICINE

## 2019-08-02 PROCEDURE — 25800030 ZZH RX IP 258 OP 636: Performed by: HOSPITALIST

## 2019-08-02 RX ORDER — LIDOCAINE 40 MG/G
CREAM TOPICAL
Status: DISCONTINUED | OUTPATIENT
Start: 2019-08-02 | End: 2019-08-02 | Stop reason: HOSPADM

## 2019-08-02 RX ORDER — PANTOPRAZOLE SODIUM 40 MG/1
40 TABLET, DELAYED RELEASE ORAL
Status: DISCONTINUED | OUTPATIENT
Start: 2019-08-02 | End: 2019-08-14 | Stop reason: HOSPADM

## 2019-08-02 RX ORDER — LIDOCAINE HYDROCHLORIDE 10 MG/ML
10 INJECTION, SOLUTION EPIDURAL; INFILTRATION; INTRACAUDAL; PERINEURAL ONCE
Status: COMPLETED | OUTPATIENT
Start: 2019-08-02 | End: 2019-08-02

## 2019-08-02 RX ORDER — POTASSIUM CHLORIDE 1500 MG/1
20-40 TABLET, EXTENDED RELEASE ORAL
Status: DISCONTINUED | OUTPATIENT
Start: 2019-08-02 | End: 2019-08-14 | Stop reason: HOSPADM

## 2019-08-02 RX ORDER — ALBUMIN (HUMAN) 12.5 G/50ML
12.5 SOLUTION INTRAVENOUS ONCE
Status: DISCONTINUED | OUTPATIENT
Start: 2019-08-02 | End: 2019-08-03

## 2019-08-02 RX ORDER — POTASSIUM CHLORIDE 7.45 MG/ML
10 INJECTION INTRAVENOUS
Status: DISCONTINUED | OUTPATIENT
Start: 2019-08-02 | End: 2019-08-14 | Stop reason: HOSPADM

## 2019-08-02 RX ORDER — POTASSIUM CHLORIDE 1.5 G/1.58G
20-40 POWDER, FOR SOLUTION ORAL
Status: DISCONTINUED | OUTPATIENT
Start: 2019-08-02 | End: 2019-08-14 | Stop reason: HOSPADM

## 2019-08-02 RX ORDER — POTASSIUM CL/LIDO/0.9 % NACL 10MEQ/0.1L
10 INTRAVENOUS SOLUTION, PIGGYBACK (ML) INTRAVENOUS
Status: DISCONTINUED | OUTPATIENT
Start: 2019-08-02 | End: 2019-08-14 | Stop reason: HOSPADM

## 2019-08-02 RX ORDER — FLUMAZENIL 0.1 MG/ML
0.2 INJECTION, SOLUTION INTRAVENOUS
Status: ACTIVE | OUTPATIENT
Start: 2019-08-02 | End: 2019-08-03

## 2019-08-02 RX ORDER — NICOTINE POLACRILEX 4 MG
15-30 LOZENGE BUCCAL
Status: DISCONTINUED | OUTPATIENT
Start: 2019-08-02 | End: 2019-08-09

## 2019-08-02 RX ORDER — NALOXONE HYDROCHLORIDE 0.4 MG/ML
.1-.4 INJECTION, SOLUTION INTRAMUSCULAR; INTRAVENOUS; SUBCUTANEOUS
Status: ACTIVE | OUTPATIENT
Start: 2019-08-02 | End: 2019-08-03

## 2019-08-02 RX ORDER — POTASSIUM CHLORIDE 29.8 MG/ML
20 INJECTION INTRAVENOUS
Status: DISCONTINUED | OUTPATIENT
Start: 2019-08-02 | End: 2019-08-14 | Stop reason: HOSPADM

## 2019-08-02 RX ORDER — MAGNESIUM SULFATE HEPTAHYDRATE 40 MG/ML
4 INJECTION, SOLUTION INTRAVENOUS EVERY 4 HOURS PRN
Status: DISCONTINUED | OUTPATIENT
Start: 2019-08-02 | End: 2019-08-13

## 2019-08-02 RX ORDER — FENTANYL CITRATE 50 UG/ML
INJECTION, SOLUTION INTRAMUSCULAR; INTRAVENOUS PRN
Status: DISCONTINUED | OUTPATIENT
Start: 2019-08-02 | End: 2019-08-02 | Stop reason: HOSPADM

## 2019-08-02 RX ORDER — DEXTROSE MONOHYDRATE 25 G/50ML
25-50 INJECTION, SOLUTION INTRAVENOUS
Status: DISCONTINUED | OUTPATIENT
Start: 2019-08-02 | End: 2019-08-09

## 2019-08-02 RX ADMIN — HYDROMORPHONE HYDROCHLORIDE 0.3 MG: 1 INJECTION, SOLUTION INTRAMUSCULAR; INTRAVENOUS; SUBCUTANEOUS at 19:40

## 2019-08-02 RX ADMIN — Medication 100 MG: at 09:28

## 2019-08-02 RX ADMIN — TAMSULOSIN HYDROCHLORIDE 0.4 MG: 0.4 CAPSULE ORAL at 09:28

## 2019-08-02 RX ADMIN — HYDROMORPHONE HYDROCHLORIDE 0.3 MG: 1 INJECTION, SOLUTION INTRAMUSCULAR; INTRAVENOUS; SUBCUTANEOUS at 05:10

## 2019-08-02 RX ADMIN — ATORVASTATIN CALCIUM 10 MG: 10 TABLET, FILM COATED ORAL at 21:33

## 2019-08-02 RX ADMIN — DULOXETINE HYDROCHLORIDE 60 MG: 60 CAPSULE, DELAYED RELEASE ORAL at 09:28

## 2019-08-02 RX ADMIN — CEFTRIAXONE SODIUM 2 G: 2 INJECTION, POWDER, FOR SOLUTION INTRAMUSCULAR; INTRAVENOUS at 18:32

## 2019-08-02 RX ADMIN — FOLIC ACID 1 MG: 1 TABLET ORAL at 09:28

## 2019-08-02 RX ADMIN — FLUTICASONE FUROATE AND VILANTEROL TRIFENATATE 1 PUFF: 200; 25 POWDER RESPIRATORY (INHALATION) at 11:30

## 2019-08-02 RX ADMIN — DEXTRAN 70, AND HYPROMELLOSE 2910 2 DROP: 1; 3 SOLUTION/ DROPS OPHTHALMIC at 11:32

## 2019-08-02 RX ADMIN — ONDANSETRON 4 MG: 2 INJECTION INTRAMUSCULAR; INTRAVENOUS at 10:04

## 2019-08-02 RX ADMIN — MIRTAZAPINE 30 MG: 30 TABLET, FILM COATED ORAL at 21:33

## 2019-08-02 RX ADMIN — KETOTIFEN FUMARATE 1 DROP: 0.35 SOLUTION/ DROPS OPHTHALMIC at 12:24

## 2019-08-02 RX ADMIN — HYDROMORPHONE HYDROCHLORIDE 0.3 MG: 1 INJECTION, SOLUTION INTRAMUSCULAR; INTRAVENOUS; SUBCUTANEOUS at 11:42

## 2019-08-02 RX ADMIN — POTASSIUM CHLORIDE 40 MEQ: 1500 TABLET, EXTENDED RELEASE ORAL at 09:34

## 2019-08-02 RX ADMIN — HYDROXYZINE HYDROCHLORIDE 25 MG: 25 TABLET ORAL at 01:41

## 2019-08-02 RX ADMIN — PANTOPRAZOLE SODIUM 40 MG: 40 TABLET, DELAYED RELEASE ORAL at 17:43

## 2019-08-02 RX ADMIN — POTASSIUM PHOSPHATE, MONOBASIC AND POTASSIUM PHOSPHATE, DIBASIC 15 MMOL: 224; 236 INJECTION, SOLUTION INTRAVENOUS at 18:33

## 2019-08-02 RX ADMIN — SODIUM CHLORIDE 8 MG/HR: 9 INJECTION, SOLUTION INTRAVENOUS at 15:37

## 2019-08-02 RX ADMIN — MAGNESIUM SULFATE IN WATER 4 G: 40 INJECTION, SOLUTION INTRAVENOUS at 11:22

## 2019-08-02 RX ADMIN — HYDROMORPHONE HYDROCHLORIDE 0.3 MG: 1 INJECTION, SOLUTION INTRAMUSCULAR; INTRAVENOUS; SUBCUTANEOUS at 13:50

## 2019-08-02 RX ADMIN — DEXTRAN 70, AND HYPROMELLOSE 2910 2 DROP: 1; 3 SOLUTION/ DROPS OPHTHALMIC at 02:35

## 2019-08-02 RX ADMIN — HYDROMORPHONE HYDROCHLORIDE 0.3 MG: 1 INJECTION, SOLUTION INTRAMUSCULAR; INTRAVENOUS; SUBCUTANEOUS at 00:44

## 2019-08-02 RX ADMIN — SODIUM CHLORIDE 8 MG/HR: 9 INJECTION, SOLUTION INTRAVENOUS at 04:33

## 2019-08-02 RX ADMIN — Medication 10 MEQ: at 12:24

## 2019-08-02 RX ADMIN — LIDOCAINE HYDROCHLORIDE 10 ML: 10 INJECTION, SOLUTION EPIDURAL; INFILTRATION; INTRACAUDAL; PERINEURAL at 08:38

## 2019-08-02 RX ADMIN — HYDROMORPHONE HYDROCHLORIDE 0.3 MG: 1 INJECTION, SOLUTION INTRAMUSCULAR; INTRAVENOUS; SUBCUTANEOUS at 09:33

## 2019-08-02 RX ADMIN — DEXTRAN 70, AND HYPROMELLOSE 2910 2 DROP: 1; 3 SOLUTION/ DROPS OPHTHALMIC at 05:12

## 2019-08-02 RX ADMIN — KETOTIFEN FUMARATE 1 DROP: 0.35 SOLUTION/ DROPS OPHTHALMIC at 21:47

## 2019-08-02 RX ADMIN — MULTIPLE VITAMINS W/ MINERALS TAB 1 TABLET: TAB at 09:28

## 2019-08-02 RX ADMIN — HYDROMORPHONE HYDROCHLORIDE 0.3 MG: 1 INJECTION, SOLUTION INTRAMUSCULAR; INTRAVENOUS; SUBCUTANEOUS at 21:43

## 2019-08-02 RX ADMIN — TRAZODONE HYDROCHLORIDE 150 MG: 100 TABLET ORAL at 21:39

## 2019-08-02 ASSESSMENT — ACTIVITIES OF DAILY LIVING (ADL)
ADLS_ACUITY_SCORE: 14

## 2019-08-02 NOTE — PROGRESS NOTES
[facility]    Hospitalist Progress Note    Assessment & Plan   Jim Richard is a 64 year old male with history of alcohol dependence, alcoholic liver disease, depression and anxiety, history of GI bleed secondary to esophageal varices presented to [facility] emergency room with dark-colored stools ongoing for the last 1 week associated with some loose bowel movements.    Melena  Suspected upper GI bleed  Alcoholic liver disease  Esophageal varices   Acute blood loss anemia secondary to GI bleed  - Continue IV protonix and octreotide drips.  - GI consulted, planning EGD later today.  - Hemoglobin up to 9.7 from 8.7 yesterday.   - Received two units PRBCs on 8/1/19.  - NPO pending EGD.  - Continue rocephin for SBP prophylaxis.  - Monitor hemoglobin.     Alcohol dependence and alcohol withdrawal  - Patient complains of being shaky since stopped drinking 1 week ago.  - He will be placed on CIWA protocol with Ativan as needed dosing per CIWA protocol.  - Psychiatry consulted.    Depression/anxiety  - Continue PTA duloxetine and remeron.     Ascites  - S/p ultrasound guided paracentesis this morning with removal of 3.2 liters of fluid.  - Cell count and culture pending.  - Already on empiric rocephin for SBP prophylaxis as noted above.     Elevated LFTs  - Due to alcoholic liver disease  - Recheck in AM.    Itchy eyes  - Artificial tears help only mildly.  - ? Allergic conjunctivitis, will add PRN antihistamine eye drop.    Hypokalemia  Hypomagnesemia  - Replace per protocol.  - QTc prolonged on EKG, likely related to electrolyte disturbances, will replace electrolytes per protocol.    DVT Prophylaxis: Pneumatic Compression Devices  Code Status: DNR/DNI  Expected discharge: 2 - 3 days, recommended to prior living arrangement once GI evaluation complete and hemoglobin stable.    Arnaldo Hughes MD  Text Page  (7am - 6pm, M-F)    Interval History   Jim Richard complains of itchy  eyes. Has been bothering him for many weeks. Artifical tears help a little bit. Abdominal feels bloated, improved after paracentesis. Some nausea, but no emesis. Had a BM last night, he thought it looked black, nursing thought it looked brown. Denies fevers, chest pain, and shortness of breath.    -Data reviewed today: I reviewed all new labs and imaging results over the last 24 hours. I personally reviewed the EKG tracing showing sinus rhythm with QTc of 555.    Physical Exam   Temp: 98  F (36.7  C) Temp src: Axillary BP: 136/80 Pulse: 73 Heart Rate: 70 Resp: 18 SpO2: 95 % O2 Device: Nasal cannula Oxygen Delivery: 1 LPM  Vitals:    08/01/19 1618   Weight: 90.7 kg (200 lb)     Vital Signs with Ranges  Temp:  [98  F (36.7  C)-99.1  F (37.3  C)] 98  F (36.7  C)  Pulse:  [72-91] 73  Heart Rate:  [70-91] 70  Resp:  [8-20] 18  BP: (123-157)/(69-92) 136/80  SpO2:  [86 %-96 %] 95 %  I/O last 3 completed shifts:  In: 953.75 [P.O.:60]  Out: 700 [Urine:700]    Constitutional: Awake, alert, cooperative, no apparent distress  Respiratory: Clear to auscultation bilaterally, no crackles or wheezing  Cardiovascular: Regular rate and rhythm, normal S1 and S2, and no murmur noted, 2+ edema  GI: Normal bowel sounds, soft, mild distendion, non-tender  Skin/Integument: No rashes, no cyanosis  HEENT: mild conjunctival redness    Medications     octreotide (sandoSTATIN) infusion ADULT 50 mcg/hr (08/01/19 9967)     pantoprazole (PROTONIX) infusion ADULT/PEDS GREATER than or EQUAL to 45 kg 8 mg/hr (08/02/19 0433)       atorvastatin  10 mg Oral At Bedtime     cefTRIAXone  2 g Intravenous Q24H     DULoxetine  60 mg Oral Daily     fluticasone-vilanterol  1 puff Inhalation Daily     folic acid  1 mg Oral Daily     mirtazapine  30 mg Oral At Bedtime     multivitamin w/minerals  1 tablet Oral Daily     sodium chloride (PF)  3 mL Intracatheter Q8H     tamsulosin  0.4 mg Oral Daily     vitamin B1  100 mg Oral Daily     Data   Recent Labs   Lab  08/02/19  0649 08/01/19  1629   WBC 3.5* 3.8*   HGB 9.7* 8.7*   MCV 91 95   * 155   INR  --  1.18*    132*   POTASSIUM 3.2* 3.8   CHLORIDE 97 95   CO2 26 27   BUN 22 23   CR 1.05 0.93   ANIONGAP 10 10   KEV 7.8* 7.9*   GLC 85 75   ALBUMIN 2.2* 2.3*   PROTTOTAL 5.9* 6.5*   BILITOTAL 6.3* 8.0*   ALKPHOS 281* 305*   ALT 42 53   * 183*     Recent Results (from the past 24 hour(s))   XR Chest 2 Views    Narrative    CHEST TWO VIEW   8/1/2019 5:54 PM     HISTORY: Chest wall pain after fall.    COMPARISON: 1/29/2019, 9/29/2018 and 3/21/2018.    FINDINGS:  Linear atelectasis versus scarring in left lateral base is  noted. Lungs are otherwise grossly clear. No pneumothorax or  significant pleural fluid collection. Heart size is upper normal.  Subtle calcific densities project over the right shoulder could  represent calcific tendinitis of the supraspinatus tendon. These were  also previously noted. Chronic right posterolateral fourth, fifth,  sixth, seventh and possibly eighth rib fractures are again noted.  There is a chronic posterior lateral left seventh rib fracture which  is also again seen. No other changes. No definite new fractures are  seen. Refracture at the chronic rib fracture sites is difficult to  exclude.      Impression    IMPRESSION:   1. Multiple chronic fractures bilateral ribs are again noted.  Refracture through the old rib fracture sites is difficult to exclude  although no definite new fracture is seen.  2. Left basilar atelectasis versus scarring.  3. No other evidence of acute cardiopulmonary disease is seen.    ANIL GARRETT MD   US Paracentesis    Narrative    ULTRASOUND PARACENTESIS 8/2/2019 9:03 AM     HISTORY:  Ascites.    FINDINGS: Ultrasound was used to evaluate for the presence and best  approach for paracentesis. Written and oral informed consent was  obtained. A pause for the cause procedure to verify the correct  patient and correct procedure. The skin overlying the  right lower  quadrant was prepped and draped in the usual sterile fashion. The  subcutaneous tissues were anesthetized with 10 mL 1% lidocaine. A  catheter was advanced into the peritoneal space and 3.2 L of  straw  colored fluid was drained. There were no immediate complications.  Ultrasound images were permanently stored.  Patient left the  ultrasound suite in satisfactory condition.      Impression    IMPRESSION: Technically successful paracentesis without immediate  complications.    TACO LINDA MD

## 2019-08-02 NOTE — PROVIDER NOTIFICATION
FEDERICO Chinchilla @ 20:38 on 8/1/19--Continue Protonix and Octreotide drips as ordered.  Tylenol should only be given for fever not for pain.  Yamileth Huang RPH

## 2019-08-02 NOTE — PROGRESS NOTES
Page to hospitalist regarding eye pain, redness and itching, unrelieved from artificial tear eye drops. Will await orders.

## 2019-08-02 NOTE — PROGRESS NOTES
RADIOLOGY PROCEDURE NOTE  Patient name: Jim Richard  MRN: [id number]  : 1955    Pre-procedure diagnosis: Ascites  Post-procedure diagnosis: Same    Procedure Date/Time: 2019  9:10 AM  Procedure: Paracentesis  Estimated blood loss: None  Specimen(s) collected with description: Ascites.  The patient tolerated the procedure well with no immediate complications.    See imaging dictation for procedural details and findings.    Provider name: Sebastien Pereira  Assistant(s):None

## 2019-08-02 NOTE — PROGRESS NOTES
RECEIVING UNIT ED HANDOFF REVIEW    ED Nurse Handoff Report was reviewed by: Paulina Azevedo on August 1, 2019 at 7:38 PM

## 2019-08-02 NOTE — PROGRESS NOTES
Order taken verbally from Dr. Agruello to stop giving meds orally. Patient only received 40mEq of potassium PO and was supposed to get 20mEq more PO. Dr. Hughes was called to clarify which protocol to follow further for IV potassium to get the rest of the dose. Order given TORB to give 10mEq potassium IV and recheck the value two hours after.

## 2019-08-02 NOTE — PROGRESS NOTES
[facility]  Pre-Endoscopy History and Physical     Jim Richard MRN# [id number]   YOB: 1955 Age: 64 year old     Date of Procedure: 8/1/2019  Primary care provider: Talon Elias  Type of Endoscopy: colonoscopy and esophagogastroduodenoscopy (upper GI endoscopy)  Reason for Procedure: GI bleed, melena  Type of Anesthesia Anticipated: Moderate Sedation    HPI:    Jim is a 64 year old male who will be undergoing the above procedure.      A history and physical has been performed. The patient's medications and allergies have been reviewed. The risks and benefits of the procedure and the sedation options and risks were discussed with the patient.  All questions were answered and informed consent was obtained.      He denies a personal or family history of anesthesia complications or bleeding disorders.     No Known Allergies     Prior to Admission Medications   Prescriptions Last Dose Informant Patient Reported? Taking?   DULoxetine (CYMBALTA) 60 MG EC capsule 8/1/2019 at am Self No Yes   Sig: Take 1 capsule (60 mg) by mouth daily   QUEtiapine (SEROQUEL) 50 MG tablet prn at prn  Yes Yes   Sig: Take 50 mg by mouth 3 times daily as needed (anxiety/sleep)   albuterol (PROAIR HFA/PROVENTIL HFA/VENTOLIN HFA) 108 (90 BASE) MCG/ACT Inhaler prn at prn Self Yes Yes   Sig: Inhale 2 puffs into the lungs every 6 hours as needed    atorvastatin (LIPITOR) 10 MG tablet 7/31/2019 at pm  Yes Yes   Sig: Take 10 mg by mouth At Bedtime   fluticasone-vilanterol (BREO ELLIPTA) 200-25 MCG/INH oral inhaler 8/1/2019 at am Self Yes Yes   Sig: Inhale 1 puff into the lungs daily    hydrOXYzine (ATARAX) 25 MG tablet 7/31/2019 at pm  Yes Yes   Sig: Take 25 mg by mouth nightly as needed (sleep)   mirtazapine (REMERON) 30 MG tablet 7/31/2019 at pm  Yes Yes   Sig: Take 30 mg by mouth At Bedtime   multivitamin, therapeutic with minerals (THERA-VIT-M) TABS tablet 8/1/2019 at am Self No Yes   Sig: Take 1  tablet by mouth daily   omeprazole (PRILOSEC) 40 MG DR capsule 8/1/2019 at am  Yes Yes   Sig: Take 40 mg by mouth daily   spironolactone-HCTZ (ALDACTAZIDE) 25-25 MG tablet 8/1/2019 at am  Yes Yes   Sig: Take 1 tablet by mouth daily   tamsulosin (FLOMAX) 0.4 MG capsule 8/1/2019 at am  Yes Yes   Sig: Take 0.4 mg by mouth daily   traZODone (DESYREL) 100 MG tablet prn at prn  Yes Yes   Sig: Take 200 mg by mouth nightly as needed for sleep      Facility-Administered Medications: None       Patient Active Problem List   Diagnosis     PAD (peripheral artery disease) (H)     MENTAL HEALTH     Anxiety and depression     Spinal stenosis, lumbar region, with neurogenic claudication     Alcohol withdrawal (H)     Fall (on) (from) other stairs and steps, initial encounter     Pain management     Chemical dependency (H)     Depression     Suicidal ideation     GI bleed     Hematemesis     Alcohol abuse     Rib Fractures (right 7th, 8th, 9th) -- occured 9/9/2018     Alcoholic hepatitis without ascites     Acute blood loss anemia     Esophageal varices (EGD with 6 varices banded 4/27/18)     Essential hypertension     COPD (chronic obstructive pulmonary disease) (H)     Smoker     JANIS on CPAP     Chronic low back pain        Past Medical History:   Diagnosis Date     Alcoholism (H) 1/14/2013    had treatment at [facility] [place]     Anxiety      Coronary artery disease 09/09/2014    Nuclear stress test with slight fixed defect inferiorly, no ischemia     Depression     w/anxiety     Esophageal varices with bleeding 04/28/2018    6 varices banded on EGD     Heart attack (H)      Hepatomegaly     Fatty liver, chronic alcoholic     Hyperlipemia      Hypertension      JANIS on CPAP      Peripheral vascular disease (H)      PVD (peripheral vascular disease) (H)      SDH (subdural hematoma) (H) 04/26/2018    Right side, resolved spontaneously     Spinal stenosis         Past Surgical History:   Procedure Laterality Date     APPENDECTOMY        BYPASS GRAFT FEMOROPOPLITEAL  6/12/2012    Procedure: BYPASS GRAFT FEMOROPOPLITEAL;  LEFT FEMORAL TO ABOVE KNEE POPLITEAL BYPASS, ENDARTERECTOMY OF SFA AND PF ARTERIES, LEFT EXTERNAL ILIAC TO COMMON FEMORAL INTERPOSITION GRAFT;  Surgeon: Damir Roberts MD;  Location:  OR     COLONOSCOPY       ENDARTERECTOMY FEMORAL  6/12/2012    Procedure: ENDARTERECTOMY FEMORAL;;  Surgeon: Damir Roberts MD;  Location:  OR     ESOPHAGOSCOPY, GASTROSCOPY, DUODENOSCOPY (EGD), COMBINED N/A 3/22/2018    Procedure: COMBINED ESOPHAGOSCOPY, GASTROSCOPY, DUODENOSCOPY (EGD);  ESOPHAGOSCOPY, GASTROSCOPY, DUODENOSOCPY.;  Surgeon: Valeri Pruitt MD;  Location:  OR     ESOPHAGOSCOPY, GASTROSCOPY, DUODENOSCOPY (EGD), COMBINED N/A 9/29/2018    Procedure: COMBINED ESOPHAGOSCOPY, GASTROSCOPY, DUODENOSCOPY (EGD);;  Surgeon: Rosendo Shaw MD;  Location:  GI     HERNIA REPAIR  2017    Abdominal     LAMINECTOMY, FUSION LUMBAR THREE+ LEVEL, COMBINED N/A 9/22/2014    Procedure: COMBINED LAMINECTOMY, FUSION LUMBAR THREE+ LEVEL;  Surgeon: Sebastien Pruitt MD;  Location:  OR     TONSILLECTOMY & ADENOIDECTOMY         Social History     Tobacco Use     Smoking status: Current Every Day Smoker     Packs/day: 1.00     Types: Cigarettes     Smokeless tobacco: Never Used     Tobacco comment: Chantix caused nightmares   Substance Use Topics     Alcohol use: Yes     Comment: 3/4th liter of vodka daily       Family History   Problem Relation Age of Onset     Mental Illness Son      Coronary Artery Disease Early Onset Mother      Substance Abuse Father      Lung Cancer Father      Substance Abuse Brother      Unknown/Adopted No family hx of      Depression No family hx of      Anxiety Disorder No family hx of      Schizophrenia No family hx of      Bipolar Disorder No family hx of      Suicide No family hx of      Dementia No family hx of      [place] Disease No family hx of      Parkinsonism No family hx of      Autism Spectrum  "Disorder No family hx of      Intellectual Disability (Mental Retardation) No family hx of        REVIEW OF SYSTEMS:     5 point ROS negative except as noted above in HPI, including Gen., Resp., CV, GI &  system review.      PHYSICAL EXAM:   /80   Pulse 73   Temp 98.3  F (36.8  C) (Oral)   Resp 18   Wt 90.7 kg (200 lb)   SpO2 95%   BMI 31.32 kg/m   Estimated body mass index is 31.32 kg/m  as calculated from the following:    Height as of 7/10/19: 1.702 m (5' 7\").    Weight as of this encounter: 90.7 kg (200 lb).   GENERAL APPEARANCE: alert and mild distress  MENTAL STATUS: interactive  AIRWAY EXAM: [place]atti Class II  RESP: lungs clear to auscultation - no rales, rhonchi or wheezes  CV: regular rates and rhythm      DIAGNOSTICS:    Not indicated      IMPRESSION   ASA Class 3 - Severe systemic disease, but not incapacitating        PLAN:       Plan for colonoscopy and esophagogastroduodenoscopy (upper GI endoscopy). We discussed the risks, benefits and alternatives and the patient wished to proceed.    The above has been forwarded to the consulting provider.      Signed Electronically by: Pavan Arguello MD  August 2, 2019  "

## 2019-08-02 NOTE — PROGRESS NOTES
Care Suites Procedure Nursing Note    Paracentesis: patient arrived to department via cart, intake info taken. Ultrasound tech performed preliminary scan to find pockets of fluid. MD arrived to explain procedure, obtained consent. Time out was then done.  Procedure started time: 0837  Procedure completed time: 0856  Concerns/abnormal assessment: Labs noted, mg+ replacement to be received on inpatient unit.   Plan: Patient was monitored with telemetry, BP and O2 sats. Patient tolerated well. VSS. 3200 cc yellow fluid removed from abdomen w/o difficulty. Bandaid applied to site after pressure was held for a few minutes.  0905- Pt back to 316-1 per cart & [employer] RN and NA transport.

## 2019-08-02 NOTE — H&P
[facility]  History and Physical   Hospitalist  Raquel Chinchilla MD       Jim Richard MRN# [id number]   YOB: 1955 Age: 64 year old      Date of Admission:  8/1/2019         Assessment and Plan:   Jim Richard is a 64 year old male with history of alcohol dependence, alcoholic liver disease, depression and anxiety, history of GI bleed secondary to esophageal varices presented to [facility] emergency room with dark-colored stools ongoing for the last 1 week associated with some loose bowel movements.    1.  Melena most likely upper GI bleed with acute blood loss anemia;   with his history of alcoholic liver disease and esophageal varices.  Variceal bleeding is a possibility  Continue IV Protonix drip and octreotide drips which were started in the emergency room  Continue IV ceftriaxone daily to prevent SBP prophylaxis  N.p.o.  Transfused 2 units of blood as patient is symptomatic with anemia with weakness and shortness of breath  Hemoglobin every 6 hours  Conditional order to transfuse if hemoglobin is less than 8 placed  GI consult with Dr. Shaw  Will avoid IV fluids in the setting of ascites and lower extremity edema    2.  Alcohol dependence and alcohol withdrawal;  Patient complains of being shaky since stopped drinking 1 week ago  He will be placed on CIWA protocol with Ativan as needed dosing per CIWA protocol    3. Ascites;  Will order ultrasound-guided paracentesis with the lab work-up  Fluid cultures ordered    4.  Elevated LFTs;  Due to alcoholic liver disease monitor closely    DVT prophylaxis PCD's    Depression anxiety;  Continue multiple PTA psychiatric medications    CODE STATUS is discussed with the patient he wants to be DNR/DNI    Admit as inpatient with [facility] monitoring for close monitoring           Code Status:   DNR / DNI         Primary Care Physician:   Talon Elias [phone]         Chief Complaint:   Melena, abdominal and lower  extremity swelling    History is obtained from the patient         History of Present Illness:   Jim Richard is a 64 year old male with history of alcohol dependence, alcoholic liver disease, depression and anxiety, history of GI bleed secondary to esophageal varices presented to [facility] emergency room with dark-colored stools ongoing for the last 1 week associated with some loose bowel movements.  He feels very weak and short of breath.  Also complains of abdomen being distended and bilateral lower extremity swelling.  He has chronic alcohol dependence and he drinks every day 0.75 L of vodka per day.  His last drink was 1 week ago.  He felt very shaky and tremulous after stopping drinking.  He denies any NSAID use.  In the emergency room his hemoglobin was found to be low at 8.7 with melena noted on his underwear.  His hemoglobin was 10.3 in July 2019.  He denies any fevers or chills.  His lab work showed his total bilirubin is elevated at 8 it was 1.4 on July 3 of 2019.  He also has elevated LFTs consistent with possible alcoholic hepatitis even cirrhosis.  He had a prior EGD done by Dr. Erickson in 2018 which showed esophageal varices.  Because of symptomatic anemia he was given 2 units of blood.  Blood transfusion consent obtained and placed in the chart.  He was started on Protonix and octreotide drips in the setting of the esophageal variceal possible bleeding.  He was also given dose of IV ceftriaxone to prevent SBP prophylaxis in the setting of the GI bleeding           Past Medical History:     Past Medical History:   Diagnosis Date     Alcoholism (H) 1/14/2013    had treatment at [facility]     Anxiety      Coronary artery disease 09/09/2014    Nuclear stress test with slight fixed defect inferiorly, no ischemia     Depression     w/anxiety     Esophageal varices with bleeding 04/28/2018    6 varices banded on EGD     Heart attack (H)      Hepatomegaly     Fatty liver, chronic alcoholic      Hyperlipemia      Hypertension      JANIS on CPAP      Peripheral vascular disease (H)      PVD (peripheral vascular disease) (H)      SDH (subdural hematoma) (H) 04/26/2018    Right side, resolved spontaneously     Spinal stenosis                Past Surgical History:     Past Surgical History:   Procedure Laterality Date     APPENDECTOMY       BYPASS GRAFT FEMOROPOPLITEAL  6/12/2012    Procedure: BYPASS GRAFT FEMOROPOPLITEAL;  LEFT FEMORAL TO ABOVE KNEE POPLITEAL BYPASS, ENDARTERECTOMY OF SFA AND PF ARTERIES, LEFT EXTERNAL ILIAC TO COMMON FEMORAL INTERPOSITION GRAFT;  Surgeon: Damir Roberts MD;  Location:  OR     COLONOSCOPY       ENDARTERECTOMY FEMORAL  6/12/2012    Procedure: ENDARTERECTOMY FEMORAL;;  Surgeon: Damir Roberts MD;  Location:  OR     ESOPHAGOSCOPY, GASTROSCOPY, DUODENOSCOPY (EGD), COMBINED N/A 3/22/2018    Procedure: COMBINED ESOPHAGOSCOPY, GASTROSCOPY, DUODENOSCOPY (EGD);  ESOPHAGOSCOPY, GASTROSCOPY, DUODENOSOCPY.;  Surgeon: Valeri Pruitt MD;  Location:  OR     ESOPHAGOSCOPY, GASTROSCOPY, DUODENOSCOPY (EGD), COMBINED N/A 9/29/2018    Procedure: COMBINED ESOPHAGOSCOPY, GASTROSCOPY, DUODENOSCOPY (EGD);;  Surgeon: Rosendo Shaw MD;  Location:  GI     HERNIA REPAIR  2017    Abdominal     LAMINECTOMY, FUSION LUMBAR THREE+ LEVEL, COMBINED N/A 9/22/2014    Procedure: COMBINED LAMINECTOMY, FUSION LUMBAR THREE+ LEVEL;  Surgeon: Sebastien Pruitt MD;  Location:  OR     TONSILLECTOMY & ADENOIDECTOMY                Home Medications:     Prior to Admission medications    Medication Sig Last Dose Taking? Auth Provider   albuterol (PROAIR HFA/PROVENTIL HFA/VENTOLIN HFA) 108 (90 BASE) MCG/ACT Inhaler Inhale 2 puffs into the lungs every 6 hours as needed  prn at prn Yes Unknown, Entered By History   atorvastatin (LIPITOR) 10 MG tablet Take 10 mg by mouth At Bedtime 7/31/2019 at pm Yes Unknown, Entered By History   DULoxetine (CYMBALTA) 60 MG EC capsule Take 1 capsule (60 mg) by  mouth daily 8/1/2019 at am Yes Brody Rand MD   fluticasone-vilanterol (BREO ELLIPTA) 200-25 MCG/INH oral inhaler Inhale 1 puff into the lungs daily  8/1/2019 at am Yes Unknown, Entered By History   hydrOXYzine (ATARAX) 25 MG tablet Take 25 mg by mouth nightly as needed (sleep) 7/31/2019 at pm Yes Unknown, Entered By History   mirtazapine (REMERON) 30 MG tablet Take 30 mg by mouth At Bedtime 7/31/2019 at pm Yes Unknown, Entered By History   multivitamin, therapeutic with minerals (THERA-VIT-M) TABS tablet Take 1 tablet by mouth daily 8/1/2019 at am Yes Jude Duarte DO   omeprazole (PRILOSEC) 40 MG DR capsule Take 40 mg by mouth daily 8/1/2019 at am Yes Unknown, Entered By History   QUEtiapine (SEROQUEL) 50 MG tablet Take 50 mg by mouth 3 times daily as needed (anxiety/sleep) prn at prn Yes Unknown, Entered By History   spironolactone-HCTZ (ALDACTAZIDE) 25-25 MG tablet Take 1 tablet by mouth daily 8/1/2019 at am Yes Unknown, Entered By History   tamsulosin (FLOMAX) 0.4 MG capsule Take 0.4 mg by mouth daily 8/1/2019 at am Yes Unknown, Entered By History   traZODone (DESYREL) 100 MG tablet Take 200 mg by mouth nightly as needed for sleep prn at prn Yes Unknown, Entered By History            Allergies:   No Known Allergies         Social History:     Social History     Tobacco Use     Smoking status: Current Every Day Smoker     Packs/day: 1.00     Types: Cigarettes     Smokeless tobacco: Never Used     Tobacco comment: Chantix caused nightmares   Substance Use Topics     Alcohol use: Yes     Comment: 3/4th liter of vodka daily                 Family History:     Family History   Problem Relation Age of Onset     Mental Illness Son      Coronary Artery Disease Early Onset Mother      Substance Abuse Father      Lung Cancer Father      Substance Abuse Brother      Unknown/Adopted No family hx of      Depression No family hx of      Anxiety Disorder No family hx of      Schizophrenia No family hx of       Bipolar Disorder No family hx of      Suicide No family hx of      Dementia No family hx of      Anthony Disease No family hx of      Parkinsonism No family hx of      Autism Spectrum Disorder No family hx of      Intellectual Disability (Mental Retardation) No family hx of                 Review of Systems:       The 10 point Review of Systems is negative other than noted in the HPI           Physical Exam:   Blood pressure (!) 140/89, pulse 91, temperature 98.7  F (37.1  C), temperature source Oral, resp. rate 18, weight 90.7 kg (200 lb), SpO2 94 %.  200 lbs 0 oz    Constitutional: Alert, awake not in any distress, icterus positive   Psych: Mood and affect are normal    Neuro: Cranial nerves 2-12 are intact, muscle power 5/5, no focal weakness   Eyes: No conjunctival injection, No scleral icterus, PERRLA   ENT: Ear, nose , throat are normal. Mucous membranes moist          Cardiovascular:  Normal rate rhythm regular   Lungs:  Diminished in the bases.no rales rhonchi or wheezing   Abdomen: Soft, moderate distention with ascites present, bowel sounds positive, no guarding rigidity or rebound   Skin:  Warm and dry.  2+ edema of the bilateral lower extremities present   Musculoskeletal:  No active tenosynovitis   Other:           Data:   All new lab and imaging data was reviewed.   Recent Labs   Lab Test 08/01/19  1629 07/03/19  0022  09/29/18  0550   WBC 3.8* 4.0   < > 6.1   HGB 8.7* 10.3*   < > 10.1*   MCV 95 97   < > 74*    103*   < > 207   INR 1.18*  --   --  1.10    < > = values in this interval not displayed.      Last Comprehensive Metabolic Panel:  Sodium   Date Value Ref Range Status   08/01/2019 132 (L) 133 - 144 mmol/L Final     Potassium   Date Value Ref Range Status   08/01/2019 3.8 3.4 - 5.3 mmol/L Final     Comment:     Specimen slightly hemolyzed, potassium may be falsely elevated     Chloride   Date Value Ref Range Status   08/01/2019 95 94 - 109 mmol/L Final     Carbon Dioxide   Date  Value Ref Range Status   08/01/2019 27 20 - 32 mmol/L Final     Anion Gap   Date Value Ref Range Status   08/01/2019 10 3 - 14 mmol/L Final     Glucose   Date Value Ref Range Status   08/01/2019 75 70 - 99 mg/dL Final     Urea Nitrogen   Date Value Ref Range Status   08/01/2019 23 7 - 30 mg/dL Final     Creatinine   Date Value Ref Range Status   08/01/2019 0.93 0.66 - 1.25 mg/dL Final     GFR Estimate   Date Value Ref Range Status   08/01/2019 86 >60 mL/min/[1.73_m2] Final     Comment:     Non  GFR Calc  Starting 12/18/2018, serum creatinine based estimated GFR (eGFR) will be   calculated using the Chronic Kidney Disease Epidemiology Collaboration   (CKD-EPI) equation.       Calcium   Date Value Ref Range Status   08/01/2019 7.9 (L) 8.5 - 10.1 mg/dL Final     Bilirubin Total   Date Value Ref Range Status   08/01/2019 8.0 (H) 0.2 - 1.3 mg/dL Final     Alkaline Phosphatase   Date Value Ref Range Status   08/01/2019 305 (H) 40 - 150 U/L Final     ALT   Date Value Ref Range Status   08/01/2019 53 0 - 70 U/L Final     AST   Date Value Ref Range Status   08/01/2019 183 (H) 0 - 45 U/L Final     Results for orders placed or performed during the hospital encounter of 08/01/19   XR Chest 2 Views    Narrative    CHEST TWO VIEW   8/1/2019 5:54 PM     HISTORY: Chest wall pain after fall.    COMPARISON: 1/29/2019, 9/29/2018 and 3/21/2018.    FINDINGS:  Linear atelectasis versus scarring in left lateral base is  noted. Lungs are otherwise grossly clear. No pneumothorax or  significant pleural fluid collection. Heart size is upper normal.  Subtle calcific densities project over the right shoulder could  represent calcific tendinitis of the supraspinatus tendon. These were  also previously noted. Chronic right posterolateral fourth, fifth,  sixth, seventh and possibly eighth rib fractures are again noted.  There is a chronic posterior lateral left seventh rib fracture which  is also again seen. No other changes. No  definite new fractures are  seen. Refracture at the chronic rib fracture sites is difficult to  exclude.      Impression    IMPRESSION:   1. Multiple chronic fractures bilateral ribs are again noted.  Refracture through the old rib fracture sites is difficult to exclude  although no definite new fracture is seen.  2. Left basilar atelectasis versus scarring.  3. No other evidence of acute cardiopulmonary disease is seen.    ANIL GARRETT MD             Recent Labs   Lab Test 08/01/19  1629 07/03/19  0022   * 140   POTASSIUM 3.8 3.5   CHLORIDE 95 106   CO2 27 26   BUN 23 15   CR 0.93 0.96   ANIONGAP 10 8   KEV 7.9* 7.6*   GLC 75 89     Recent Labs   Lab Test 04/26/18  1940   TROPI <0.015

## 2019-08-02 NOTE — CONSULTS
Consult received.  Melena for 1 week. Hemodynamically stable.  H/O ETOH cirrhosis and portal HTN.  Agree with I/V protonix.  Transfuse.  Plan for EGD in AM or tonight if change in status.    Rosendo Shaw GI

## 2019-08-02 NOTE — PLAN OF CARE
A&Ox4. VS htn 140s/80s, on 1L O2 overnight, pt says he has home cpap he usually uses but declined cpap while here. Up with assist of one, pt feels very weak. NPO ex meds. Voiding, urine concentrated. Stool x1, loose, brown with red streaks. Abdominal pain controlled with IV dilaudid. Abdomen distended/rounded. Protonix and Octretide gtt infusing. x2 units of blood transfused, hbg recheck in AM. Bruising throughout body, on R arm, abdomen and ribs from previous fall. Scabbing on extremities. Jaundice. Eye drops PRN for irritated/itching eyes. Plan for EGD.

## 2019-08-02 NOTE — CONSULTS
[facility]    Gastroenterology Consultation    Date of Admission:  8/1/2019    Assessment & Plan   Jim Richard is a 64 year old male who was admitted on 8/1/2019. I was asked to see the patient for GI bleed with melena. PMH includes alcohol dependence, alcoholic liver disease, depression and anxiety. Pt also likely to have decompensated cirrhosis (MELD-Na 19, Child C) with ascites and possible esophageal varices but pt denies hematemesis in the past.    Melena: vital stable ovenright  -less likely to be variceal bleeding given stable vitals and no hematemesis  -more likely of PUD or esophagitis or duodenitis, small likelihood of malignancy  -will plan for EGD with possible intervention this am    Decompensated liver cirrhosis w/ ascites  -s/p 3 L removal from IR paracentesis, will follow up labs result  -pt will need diuretics when acute event resolved (GI bleed)  -c/w octreotide and ceftriaxone for 3-5 days depends on discharge planning to prevent systemic infection in cirrhotic pt with ascites    Esophageal varices: possible  -will screen during EGD and possible banding if large or with red steafnia sign    Abnormal physical exam with palpable mass at epigastric  -could be related to hepatosplenomegaly but will recommend CT w/ PO and IV contrast if negative EGD finding, non-urgent    Etoh dependence:  -consider substance abuse/psych consult    Active Problems:    GI bleed      LucindaMeghann Arguello    Code Status    DNR/DNI    Primary Care Physician   FERNANDA BRANTLEY    Chief Complaint   Dark stool and abdominal distension    History is obtained from the patient    History of Present Illness   Jim Richard is a 64 year old male who presents with dark-colored stools ongoing for the last 1 week associated with some loose bowel movements.  He feels very weak and short of breath.  Also complains of abdomen being distended and bilateral lower extremity swelling.  He has chronic alcohol dependence  and he drinks every day 0.75 L of vodka per day.  His last drink was 1 week ago.  He denies use of NSAID.    Past Medical History   I have reviewed this patient's medical history and updated it with pertinent information if needed.   Past Medical History:   Diagnosis Date     Alcoholism (H) 1/14/2013    had treatment at [facility]     Anxiety      Coronary artery disease 09/09/2014    Nuclear stress test with slight fixed defect inferiorly, no ischemia     Depression     w/anxiety     Esophageal varices with bleeding 04/28/2018    6 varices banded on EGD     Heart attack (H)      Hepatomegaly     Fatty liver, chronic alcoholic     Hyperlipemia      Hypertension      JANIS on CPAP      Peripheral vascular disease (H)      PVD (peripheral vascular disease) (H)      SDH (subdural hematoma) (H) 04/26/2018    Right side, resolved spontaneously     Spinal stenosis        Past Surgical History   I have reviewed this patient's surgical history and updated it with pertinent information if needed.  Past Surgical History:   Procedure Laterality Date     APPENDECTOMY       BYPASS GRAFT FEMOROPOPLITEAL  6/12/2012    Procedure: BYPASS GRAFT FEMOROPOPLITEAL;  LEFT FEMORAL TO ABOVE KNEE POPLITEAL BYPASS, ENDARTERECTOMY OF SFA AND PF ARTERIES, LEFT EXTERNAL ILIAC TO COMMON FEMORAL INTERPOSITION GRAFT;  Surgeon: Damir Roberts MD;  Location:  OR     COLONOSCOPY       ENDARTERECTOMY FEMORAL  6/12/2012    Procedure: ENDARTERECTOMY FEMORAL;;  Surgeon: Damir Roberts MD;  Location:  OR     ESOPHAGOSCOPY, GASTROSCOPY, DUODENOSCOPY (EGD), COMBINED N/A 3/22/2018    Procedure: COMBINED ESOPHAGOSCOPY, GASTROSCOPY, DUODENOSCOPY (EGD);  ESOPHAGOSCOPY, GASTROSCOPY, DUODENOSOCPY.;  Surgeon: Valeri Pruitt MD;  Location:  OR     ESOPHAGOSCOPY, GASTROSCOPY, DUODENOSCOPY (EGD), COMBINED N/A 9/29/2018    Procedure: COMBINED ESOPHAGOSCOPY, GASTROSCOPY, DUODENOSCOPY (EGD);;  Surgeon: Rosendo Shaw MD;  Location:  GI      HERNIA REPAIR  2017    Abdominal     LAMINECTOMY, FUSION LUMBAR THREE+ LEVEL, COMBINED N/A 9/22/2014    Procedure: COMBINED LAMINECTOMY, FUSION LUMBAR THREE+ LEVEL;  Surgeon: Sebastien Pruitt MD;  Location:  OR     TONSILLECTOMY & ADENOIDECTOMY         Prior to Admission Medications   Prior to Admission Medications   Prescriptions Last Dose Informant Patient Reported? Taking?   DULoxetine (CYMBALTA) 60 MG EC capsule 8/1/2019 at am Self No Yes   Sig: Take 1 capsule (60 mg) by mouth daily   QUEtiapine (SEROQUEL) 50 MG tablet prn at prn  Yes Yes   Sig: Take 50 mg by mouth 3 times daily as needed (anxiety/sleep)   albuterol (PROAIR HFA/PROVENTIL HFA/VENTOLIN HFA) 108 (90 BASE) MCG/ACT Inhaler prn at prn Self Yes Yes   Sig: Inhale 2 puffs into the lungs every 6 hours as needed    atorvastatin (LIPITOR) 10 MG tablet 7/31/2019 at pm  Yes Yes   Sig: Take 10 mg by mouth At Bedtime   fluticasone-vilanterol (BREO ELLIPTA) 200-25 MCG/INH oral inhaler 8/1/2019 at am Self Yes Yes   Sig: Inhale 1 puff into the lungs daily    hydrOXYzine (ATARAX) 25 MG tablet 7/31/2019 at pm  Yes Yes   Sig: Take 25 mg by mouth nightly as needed (sleep)   mirtazapine (REMERON) 30 MG tablet 7/31/2019 at pm  Yes Yes   Sig: Take 30 mg by mouth At Bedtime   multivitamin, therapeutic with minerals (THERA-VIT-M) TABS tablet 8/1/2019 at am Self No Yes   Sig: Take 1 tablet by mouth daily   omeprazole (PRILOSEC) 40 MG DR capsule 8/1/2019 at am  Yes Yes   Sig: Take 40 mg by mouth daily   spironolactone-HCTZ (ALDACTAZIDE) 25-25 MG tablet 8/1/2019 at am  Yes Yes   Sig: Take 1 tablet by mouth daily   tamsulosin (FLOMAX) 0.4 MG capsule 8/1/2019 at am  Yes Yes   Sig: Take 0.4 mg by mouth daily   traZODone (DESYREL) 100 MG tablet prn at prn  Yes Yes   Sig: Take 200 mg by mouth nightly as needed for sleep      Facility-Administered Medications: None     Allergies   No Known Allergies    Social History   I have reviewed this patient's social history and updated it  with pertinent information if needed. Jim Richard  reports that he has been smoking cigarettes.  He has been smoking about 1.00 pack per day. He has never used smokeless tobacco. He reports that he drinks alcohol. He reports that he does not use drugs.    Family History   I have reviewed this patient's family history and updated it with pertinent information if needed.   Family History   Problem Relation Age of Onset     Mental Illness Son      Coronary Artery Disease Early Onset Mother      Substance Abuse Father      Lung Cancer Father      Substance Abuse Brother      Unknown/Adopted No family hx of      Depression No family hx of      Anxiety Disorder No family hx of      Schizophrenia No family hx of      Bipolar Disorder No family hx of      Suicide No family hx of      Dementia No family hx of      [place] Disease No family hx of      Parkinsonism No family hx of      Autism Spectrum Disorder No family hx of      Intellectual Disability (Mental Retardation) No family hx of        Review of Systems   The 10 point Review of Systems is negative other than noted in the HPI or here.     Physical Exam   Temp: 98.3  F (36.8  C) Temp src: Oral BP: 138/80 Pulse: 72 Heart Rate: 73 Resp: 20 SpO2: 92 % O2 Device: None (Room air) Oxygen Delivery: 1 LPM  Vital Signs with Ranges  Temp:  [98  F (36.7  C)-99.1  F (37.3  C)] 98.3  F (36.8  C)  Pulse:  [72-91] 72  Heart Rate:  [70-91] 73  Resp:  [8-20] 20  BP: (123-157)/(69-92) 138/80  SpO2:  [86 %-96 %] 92 %  200 lbs 0 oz  Exam:  Constitutional: alert, no distress and mild distress  Head: Normocephalic. No masses, lesions, tenderness or abnormalities  Neck: Neck supple. No adenopathy. Thyroid symmetric, normal size, Carotids without bruits.  ENT: ENT exam normal, no neck nodes or sinus tenderness  Cardiovascular: negative, PMI normal. No lifts, heaves, or thrills. RRR. No murmurs, clicks gallops or rub  Respiratory: negative, Percussion normal. Good diaphragmatic  excursion. Lungs clear  Gastrointestinal: Abdomen soft, tender at epigastric are. BS normal. Firm non-mobile mass palpated at epigastric area, ventral hernia  Musculoskeletal: extremities normal- no gross deformities noted, normal muscle tone and muscle wasting   Skin: no suspicious lesions or rashes  Neurologic: Gait normal. Reflexes normal and symmetric. Sensation grossly WNL.  Psychiatric: mentation appears normal and affect normal/bright    For Consult: 8 of these systems with two items each equates to billing at 253 or 252 consult codes    Data   Elevated Tbili and AST/ALT with AST > 3x ALT  INR remains stable at 1.18

## 2019-08-03 ENCOUNTER — APPOINTMENT (OUTPATIENT)
Dept: CT IMAGING | Facility: CLINIC | Age: 64
DRG: 369 | End: 2019-08-03
Attending: HOSPITALIST
Payer: MEDICARE

## 2019-08-03 LAB
ALBUMIN SERPL-MCNC: 2.1 G/DL (ref 3.4–5)
ALP SERPL-CCNC: 269 U/L (ref 40–150)
ALT SERPL W P-5'-P-CCNC: 42 U/L (ref 0–70)
AMMONIA PLAS-SCNC: 47 UMOL/L (ref 10–50)
ANION GAP SERPL CALCULATED.3IONS-SCNC: 7 MMOL/L (ref 3–14)
AST SERPL W P-5'-P-CCNC: 108 U/L (ref 0–45)
BILIRUB DIRECT SERPL-MCNC: 3.5 MG/DL (ref 0–0.2)
BILIRUB SERPL-MCNC: 4.3 MG/DL (ref 0.2–1.3)
BUN SERPL-MCNC: 18 MG/DL (ref 7–30)
CALCIUM SERPL-MCNC: 7.8 MG/DL (ref 8.5–10.1)
CHLORIDE SERPL-SCNC: 99 MMOL/L (ref 94–109)
CO2 SERPL-SCNC: 28 MMOL/L (ref 20–32)
CREAT SERPL-MCNC: 1.03 MG/DL (ref 0.66–1.25)
ERYTHROCYTE [DISTWIDTH] IN BLOOD BY AUTOMATED COUNT: 18 % (ref 10–15)
GFR SERPL CREATININE-BSD FRML MDRD: 76 ML/MIN/{1.73_M2}
GLUCOSE SERPL-MCNC: 146 MG/DL (ref 70–99)
HCT VFR BLD AUTO: 29.7 % (ref 40–53)
HGB BLD-MCNC: 10.1 G/DL (ref 13.3–17.7)
HGB BLD-MCNC: 10.1 G/DL (ref 13.3–17.7)
LACTATE BLD-SCNC: 1.2 MMOL/L (ref 0.7–2)
MAGNESIUM SERPL-MCNC: 1.6 MG/DL (ref 1.6–2.3)
MCH RBC QN AUTO: 30.8 PG (ref 26.5–33)
MCHC RBC AUTO-ENTMCNC: 34 G/DL (ref 31.5–36.5)
MCV RBC AUTO: 91 FL (ref 78–100)
PHOSPHATE SERPL-MCNC: 2.9 MG/DL (ref 2.5–4.5)
PLATELET # BLD AUTO: 120 10E9/L (ref 150–450)
POTASSIUM SERPL-SCNC: 3.8 MMOL/L (ref 3.4–5.3)
PROT SERPL-MCNC: 5.7 G/DL (ref 6.8–8.8)
RBC # BLD AUTO: 3.28 10E12/L (ref 4.4–5.9)
SODIUM SERPL-SCNC: 134 MMOL/L (ref 133–144)
WBC # BLD AUTO: 3.5 10E9/L (ref 4–11)

## 2019-08-03 PROCEDURE — 36415 COLL VENOUS BLD VENIPUNCTURE: CPT | Performed by: INTERNAL MEDICINE

## 2019-08-03 PROCEDURE — 74177 CT ABD & PELVIS W/CONTRAST: CPT

## 2019-08-03 PROCEDURE — 99232 SBSQ HOSP IP/OBS MODERATE 35: CPT | Performed by: HOSPITALIST

## 2019-08-03 PROCEDURE — 25000128 H RX IP 250 OP 636: Performed by: INTERNAL MEDICINE

## 2019-08-03 PROCEDURE — 85027 COMPLETE CBC AUTOMATED: CPT | Performed by: HOSPITALIST

## 2019-08-03 PROCEDURE — 99207 ZZC NO BILLABLE SERVICE THIS VISIT: CPT | Performed by: INTERNAL MEDICINE

## 2019-08-03 PROCEDURE — 80048 BASIC METABOLIC PNL TOTAL CA: CPT | Performed by: HOSPITALIST

## 2019-08-03 PROCEDURE — 82140 ASSAY OF AMMONIA: CPT | Performed by: INTERNAL MEDICINE

## 2019-08-03 PROCEDURE — 25000125 ZZHC RX 250

## 2019-08-03 PROCEDURE — 83735 ASSAY OF MAGNESIUM: CPT | Performed by: HOSPITALIST

## 2019-08-03 PROCEDURE — 83605 ASSAY OF LACTIC ACID: CPT | Performed by: HOSPITALIST

## 2019-08-03 PROCEDURE — 36415 COLL VENOUS BLD VENIPUNCTURE: CPT | Performed by: HOSPITALIST

## 2019-08-03 PROCEDURE — 12000047 ZZH R&B IMCU

## 2019-08-03 PROCEDURE — 40000007 ZZH STATISTIC ADULT CD FACE TO FACE-NO CHRG

## 2019-08-03 PROCEDURE — 25000125 ZZHC RX 250: Performed by: HOSPITALIST

## 2019-08-03 PROCEDURE — 25000132 ZZH RX MED GY IP 250 OP 250 PS 637: Mod: GY | Performed by: HOSPITALIST

## 2019-08-03 PROCEDURE — 25000132 ZZH RX MED GY IP 250 OP 250 PS 637: Mod: GY | Performed by: INTERNAL MEDICINE

## 2019-08-03 PROCEDURE — 99222 1ST HOSP IP/OBS MODERATE 55: CPT | Performed by: PSYCHIATRY & NEUROLOGY

## 2019-08-03 PROCEDURE — 80076 HEPATIC FUNCTION PANEL: CPT | Performed by: HOSPITALIST

## 2019-08-03 PROCEDURE — 12000000 ZZH R&B MED SURG/OB

## 2019-08-03 PROCEDURE — 84100 ASSAY OF PHOSPHORUS: CPT | Performed by: HOSPITALIST

## 2019-08-03 PROCEDURE — 25000125 ZZHC RX 250: Performed by: INTERNAL MEDICINE

## 2019-08-03 RX ORDER — HALOPERIDOL 5 MG/ML
5 INJECTION INTRAMUSCULAR EVERY 6 HOURS PRN
Status: DISCONTINUED | OUTPATIENT
Start: 2019-08-03 | End: 2019-08-12

## 2019-08-03 RX ORDER — LIDOCAINE HYDROCHLORIDE 20 MG/ML
JELLY TOPICAL
Status: COMPLETED
Start: 2019-08-03 | End: 2019-08-03

## 2019-08-03 RX ORDER — IOPAMIDOL 755 MG/ML
92 INJECTION, SOLUTION INTRAVASCULAR ONCE
Status: COMPLETED | OUTPATIENT
Start: 2019-08-03 | End: 2019-08-03

## 2019-08-03 RX ORDER — LIDOCAINE HYDROCHLORIDE 20 MG/ML
10 JELLY TOPICAL ONCE
Status: COMPLETED | OUTPATIENT
Start: 2019-08-03 | End: 2019-08-03

## 2019-08-03 RX ORDER — NADOLOL 40 MG/1
40 TABLET ORAL DAILY
Status: DISCONTINUED | OUTPATIENT
Start: 2019-08-04 | End: 2019-08-14 | Stop reason: HOSPADM

## 2019-08-03 RX ADMIN — FLUTICASONE FUROATE AND VILANTEROL TRIFENATATE 1 PUFF: 200; 25 POWDER RESPIRATORY (INHALATION) at 08:43

## 2019-08-03 RX ADMIN — HYDROMORPHONE HYDROCHLORIDE 0.3 MG: 1 INJECTION, SOLUTION INTRAMUSCULAR; INTRAVENOUS; SUBCUTANEOUS at 08:49

## 2019-08-03 RX ADMIN — LORAZEPAM 1 MG: 0.5 TABLET ORAL at 19:04

## 2019-08-03 RX ADMIN — Medication 100 MG: at 11:31

## 2019-08-03 RX ADMIN — CEFTRIAXONE SODIUM 2 G: 2 INJECTION, POWDER, FOR SOLUTION INTRAMUSCULAR; INTRAVENOUS at 17:15

## 2019-08-03 RX ADMIN — DEXTRAN 70, AND HYPROMELLOSE 2910 2 DROP: 1; 3 SOLUTION/ DROPS OPHTHALMIC at 04:37

## 2019-08-03 RX ADMIN — LIDOCAINE HYDROCHLORIDE 10 ML: 20 JELLY TOPICAL at 11:52

## 2019-08-03 RX ADMIN — PANTOPRAZOLE SODIUM 40 MG: 40 TABLET, DELAYED RELEASE ORAL at 06:22

## 2019-08-03 RX ADMIN — LORAZEPAM 1 MG: 0.5 TABLET ORAL at 00:20

## 2019-08-03 RX ADMIN — SODIUM CHLORIDE 68 ML: 9 INJECTION, SOLUTION INTRAVENOUS at 10:51

## 2019-08-03 RX ADMIN — FOLIC ACID 1 MG: 1 TABLET ORAL at 11:32

## 2019-08-03 RX ADMIN — TAMSULOSIN HYDROCHLORIDE 0.4 MG: 0.4 CAPSULE ORAL at 11:32

## 2019-08-03 RX ADMIN — LIDOCAINE HYDROCHLORIDE 10 ML: 20 JELLY TOPICAL at 21:07

## 2019-08-03 RX ADMIN — HYDROMORPHONE HYDROCHLORIDE 0.3 MG: 1 INJECTION, SOLUTION INTRAMUSCULAR; INTRAVENOUS; SUBCUTANEOUS at 00:20

## 2019-08-03 RX ADMIN — IOPAMIDOL 92 ML: 755 INJECTION, SOLUTION INTRAVENOUS at 10:50

## 2019-08-03 RX ADMIN — LIDOCAINE HYDROCHLORIDE 4 ML: 20 JELLY TOPICAL at 12:10

## 2019-08-03 RX ADMIN — LORAZEPAM 1 MG: 0.5 TABLET ORAL at 20:07

## 2019-08-03 RX ADMIN — HALOPERIDOL LACTATE 5 MG: 5 INJECTION INTRAMUSCULAR at 21:01

## 2019-08-03 RX ADMIN — ATORVASTATIN CALCIUM 10 MG: 10 TABLET, FILM COATED ORAL at 22:57

## 2019-08-03 RX ADMIN — LORAZEPAM 1 MG: 0.5 TABLET ORAL at 17:12

## 2019-08-03 RX ADMIN — LORAZEPAM 1 MG: 0.5 TABLET ORAL at 22:56

## 2019-08-03 RX ADMIN — LORAZEPAM 1 MG: 2 INJECTION INTRAMUSCULAR; INTRAVENOUS at 13:45

## 2019-08-03 RX ADMIN — LORAZEPAM 1 MG: 2 INJECTION INTRAMUSCULAR; INTRAVENOUS at 15:59

## 2019-08-03 RX ADMIN — HYDROMORPHONE HYDROCHLORIDE 0.3 MG: 1 INJECTION, SOLUTION INTRAMUSCULAR; INTRAVENOUS; SUBCUTANEOUS at 13:38

## 2019-08-03 RX ADMIN — DULOXETINE HYDROCHLORIDE 60 MG: 60 CAPSULE, DELAYED RELEASE ORAL at 11:32

## 2019-08-03 RX ADMIN — HYDROMORPHONE HYDROCHLORIDE 0.3 MG: 1 INJECTION, SOLUTION INTRAMUSCULAR; INTRAVENOUS; SUBCUTANEOUS at 04:41

## 2019-08-03 RX ADMIN — MULTIPLE VITAMINS W/ MINERALS TAB 1 TABLET: TAB at 11:32

## 2019-08-03 ASSESSMENT — ACTIVITIES OF DAILY LIVING (ADL)
ADLS_ACUITY_SCORE: 14
ADLS_ACUITY_SCORE: 10.5

## 2019-08-03 NOTE — PROGRESS NOTES
Xcoverage: paged by RN regarding diet order; as per GI note- plan for EGD/colonoscopy today? But no bowel prep orders; will keep the patient npo for now, defer to GI ([facility]) the prep for colonoscopy.    Jazmyn Hannah MD

## 2019-08-03 NOTE — CONSULTS
"[facility]    History and Physical - Psychiatry     DATE OF ADMISSION:  8/1/2019    HPI:  Jim Richard is a 64 year old male with a psychiatric history of alcohol use disorder, depression, and anxiety and a medical history to include alcoholic cirrhosis, and esophageal varices that was admitted to the hospital for management of acute GI bleed after presenting with complaints of one week's duration of weakness, melena, abdominal distention, and bilateral LE swelling. Patient is scheduled for colonoscopy tomorrow.    Our service was consulted given his alcohol use and history of depression and anxiety. The CD consult service was also consulted, though patient indicated he wasn't interested in resource referral and the consult was terminated. On my interview the patient recognizes his problem with alcohol and refused the consult as he tells me \"I've been to 7 of them, I should know what to do, I gotta get back to AA.\" Notes having tried disulfiram and acamprosate in the past without success in maintaining sobriety. He reports having lapsed from AA a few months ago, though doesn't really explain why (of note he's not a great psychiatric interview given his medical circumstances and associated drowsiness). He reports even in AA it was difficult to maintain sobriety and he can't recall the last time he's accrued any duration of abstinence. He reports last CD treatment was inpatient, 2 yrs ago, at a facility whose name he can't recall but has since been torn down. He follows with a psychiatrist as well (Dr. Knowles) but tells me he's missed the last couple appts. He notes being on his current psychotropic regimen about 6 months and isn't certain if it's conferring benefit, though recognizes that his active drinking may override any hopeful improvement with antidepressant treatment. He denies SI or other safety concerns. Sleep and appetite tend to revolve around his alcohol use. Notes he slept well in " the hospital last night and his appetite has been decent here as well.      PAST MEDICAL HISTORY:  Past Medical History:   Diagnosis Date     Alcoholism (H) 1/14/2013    had treatment at [facility]     Anxiety      Coronary artery disease 09/09/2014    Nuclear stress test with slight fixed defect inferiorly, no ischemia     Depression     w/anxiety     Esophageal varices with bleeding 04/28/2018    6 varices banded on EGD     Heart attack (H)      Hepatomegaly     Fatty liver, chronic alcoholic     Hyperlipemia      Hypertension      JANIS on CPAP      Peripheral vascular disease (H)      PVD (peripheral vascular disease) (H)      SDH (subdural hematoma) (H) 04/26/2018    Right side, resolved spontaneously     Spinal stenosis        FAMILY HISTORY:  Family History   Problem Relation Age of Onset     Mental Illness Son      Coronary Artery Disease Early Onset Mother      Substance Abuse Father      Lung Cancer Father      Substance Abuse Brother      Unknown/Adopted No family hx of      Depression No family hx of      Anxiety Disorder No family hx of      Schizophrenia No family hx of      Bipolar Disorder No family hx of      Suicide No family hx of      Dementia No family hx of      Kershaw Disease No family hx of      Parkinsonism No family hx of      Autism Spectrum Disorder No family hx of      Intellectual Disability (Mental Retardation) No family hx of        SOCIAL HISTORY:  Social History     Socioeconomic History     Marital status:      Spouse name: None     Number of children: 2     Years of education: None     Highest education level: None   Occupational History     Occupation: Printer, on disability   Social Needs     Financial resource strain: None     Food insecurity:     Worry: None     Inability: None     Transportation needs:     Medical: None     Non-medical: None   Tobacco Use     Smoking status: Current Every Day Smoker     Packs/day: 1.00     Types: Cigarettes     Smokeless  tobacco: Never Used     Tobacco comment: Chantix caused nightmares   Substance and Sexual Activity     Alcohol use: Yes     Comment: 3/4th liter of vodka daily     Drug use: No     Sexual activity: Not Currently   Lifestyle     Physical activity:     Days per week: None     Minutes per session: None     Stress: None   Relationships     Social connections:     Talks on phone: None     Gets together: None     Attends Anabaptism service: None     Active member of club or organization: None     Attends meetings of clubs or organizations: None     Relationship status: None     Intimate partner violence:     Fear of current or ex partner: None     Emotionally abused: None     Physically abused: None     Forced sexual activity: None   Other Topics Concern     Parent/sibling w/ CABG, MI or angioplasty before 65F 55M? 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  Has a living will and is DNR/DNI.  Wants his brother Jame contacted.  (last updated 9/29/2018)        REVIEW OF SYSTEMS:  10 point review of systems completed and negative else reported in the HPI.    ALLERGIES:  No Known Allergies    PRIOR TO ADMISSION MEDICATIONS:  Prior to Admission Medications   Prescriptions Last Dose Informant Patient Reported? Taking?   DULoxetine (CYMBALTA) 60 MG EC capsule 8/1/2019 at am Self No Yes   Sig: Take 1 capsule (60 mg) by mouth daily   QUEtiapine (SEROQUEL) 50 MG tablet prn at prn  Yes Yes   Sig: Take 50 mg by mouth 3 times daily as needed (anxiety/sleep)   albuterol (PROAIR HFA/PROVENTIL HFA/VENTOLIN HFA) 108 (90 BASE) MCG/ACT Inhaler prn at prn Self Yes Yes   Sig: Inhale 2 puffs into the lungs every 6 hours as needed    atorvastatin (LIPITOR) 10 MG tablet 7/31/2019 at pm  Yes Yes   Sig: Take 10 mg by mouth At Bedtime   fluticasone-vilanterol (BREO ELLIPTA) 200-25 MCG/INH oral inhaler 8/1/2019 at am Self Yes Yes   Sig: Inhale 1 puff into the lungs daily    hydrOXYzine  (ATARAX) 25 MG tablet 7/31/2019 at pm  Yes Yes   Sig: Take 25 mg by mouth nightly as needed (sleep)   mirtazapine (REMERON) 30 MG tablet 7/31/2019 at pm  Yes Yes   Sig: Take 30 mg by mouth At Bedtime   multivitamin, therapeutic with minerals (THERA-VIT-M) TABS tablet 8/1/2019 at am Self No Yes   Sig: Take 1 tablet by mouth daily   omeprazole (PRILOSEC) 40 MG DR capsule 8/1/2019 at am  Yes Yes   Sig: Take 40 mg by mouth daily   spironolactone-HCTZ (ALDACTAZIDE) 25-25 MG tablet 8/1/2019 at am  Yes Yes   Sig: Take 1 tablet by mouth daily   tamsulosin (FLOMAX) 0.4 MG capsule 8/1/2019 at am  Yes Yes   Sig: Take 0.4 mg by mouth daily   traZODone (DESYREL) 100 MG tablet prn at prn  Yes Yes   Sig: Take 200 mg by mouth nightly as needed for sleep      Facility-Administered Medications: None       LABS/VITALS:  Recent Results (from the past 24 hour(s))   Hemoglobin    Collection Time: 08/02/19  3:56 PM   Result Value Ref Range    Hemoglobin 9.8 (L) 13.3 - 17.7 g/dL   Potassium    Collection Time: 08/02/19  3:56 PM   Result Value Ref Range    Potassium 3.8 3.4 - 5.3 mmol/L   Magnesium    Collection Time: 08/02/19  3:56 PM   Result Value Ref Range    Magnesium 2.1 1.6 - 2.3 mg/dL   Hemoglobin    Collection Time: 08/02/19 11:57 PM   Result Value Ref Range    Hemoglobin 10.1 (L) 13.3 - 17.7 g/dL   Hepatic panel    Collection Time: 08/03/19  5:46 AM   Result Value Ref Range    Bilirubin Direct 3.5 (H) 0.0 - 0.2 mg/dL    Bilirubin Total 4.3 (H) 0.2 - 1.3 mg/dL    Albumin 2.1 (L) 3.4 - 5.0 g/dL    Protein Total 5.7 (L) 6.8 - 8.8 g/dL    Alkaline Phosphatase 269 (H) 40 - 150 U/L    ALT 42 0 - 70 U/L     (H) 0 - 45 U/L   CBC with platelets    Collection Time: 08/03/19  5:46 AM   Result Value Ref Range    WBC 3.5 (L) 4.0 - 11.0 10e9/L    RBC Count 3.28 (L) 4.4 - 5.9 10e12/L    Hemoglobin 10.1 (L) 13.3 - 17.7 g/dL    Hematocrit 29.7 (L) 40.0 - 53.0 %    MCV 91 78 - 100 fl    MCH 30.8 26.5 - 33.0 pg    MCHC 34.0 31.5 - 36.5 g/dL     RDW 18.0 (H) 10.0 - 15.0 %    Platelet Count 120 (L) 150 - 450 10e9/L   Basic metabolic panel    Collection Time: 08/03/19  5:46 AM   Result Value Ref Range    Sodium 134 133 - 144 mmol/L    Potassium 3.8 3.4 - 5.3 mmol/L    Chloride 99 94 - 109 mmol/L    Carbon Dioxide 28 20 - 32 mmol/L    Anion Gap 7 3 - 14 mmol/L    Glucose 146 (H) 70 - 99 mg/dL    Urea Nitrogen 18 7 - 30 mg/dL    Creatinine 1.03 0.66 - 1.25 mg/dL    GFR Estimate 76 >60 mL/min/[1.73_m2]    GFR Estimate If Black 88 >60 mL/min/[1.73_m2]    Calcium 7.8 (L) 8.5 - 10.1 mg/dL   Magnesium    Collection Time: 08/03/19  5:46 AM   Result Value Ref Range    Magnesium 1.6 1.6 - 2.3 mg/dL   Phosphorus    Collection Time: 08/03/19  5:46 AM   Result Value Ref Range    Phosphorus 2.9 2.5 - 4.5 mg/dL   Lactic acid level STAT for sepsis protocol    Collection Time: 08/03/19  8:58 AM   Result Value Ref Range    Lactate for Sepsis Protocol 1.2 0.7 - 2.0 mmol/L     B/P: 132/82, T: 98, P: 82, R: 13    MENTAL STATUS EXAM:  Appearance:  Jaundiced, tremulous, drowsy  Eye Contact:  good  Speech:  clear, coherent  Language: Normal  Psychomotor Behavior:  Gross distal UE tremor  Mood:  stable  Affect:  appropriate and in normal range  Thought Process:  logical, linear and goal oriented no loose associations  Thought Content:  no evidence of suicidal ideation or homicidal ideation and no evidence of psychotic thought  Oriented to:  time, person, and place  Attention Span and Concentration:  fair  Recent and Remote Memory:  fair  Fund of Knowledge: appropriate  Muscle Strength and Tone: not assessed  Gait and Station: not assessed  Insight:  fair  Judgment:  Fair     IMPRESSION:  Jim Richard is a 64 year old male with a psychiatric history of alcohol use disorder, depression, and anxiety and a medical history to include alcoholic cirrhosis and ascites with past GI bleed. Curerntly hospitalized for management of a recurrent GI bleed. Patient still struggles with  drinking though has been to treatment multiple times and feels he knows what's required and wouldn't learn anything new in treatment. He does express desire to return to AA. It's not clear to me what his current medical recovery will be like and what kind of assistance he may need going forward, though this may prove relevant if he's in an environment where alcohol isn't accessible. With respect to his depression and anxiety, sobriety would be the most important intervention as I'm not optimistic the psychotropics alone will override the effects of alcohol. He's not suicidal or warranting inpatient psychiatric treatment. There is room to make adjustment on his primary antidepressant (Cymbalta), but wouldn't do this with current GI pathology. He follows with a psychiatrist as an outpatient (Dr. Knowles) so further adjustments can be exercised once patient more medically stable.    DIAGNOSES:  1. Alcohol use disorder, severe  2. Major depressive disorder vs Substance-induced depressive disorder  3. GI Bleed    PLAN:  1. Recommend CD evaluation however patient has refused this; has intentions of resuming AA   2. Continue psychiatric medication without modification  3. We will not pursue CD commitment  4. Reconsult psych as needed      Attestation:  Patient has been seen and evaluated by me,  Eros Kearney,

## 2019-08-03 NOTE — PROGRESS NOTES
[facility]    Hospitalist Progress Note    Assessment & Plan   Jim Richard is a 64 year old male with history of alcohol dependence, alcoholic liver disease, depression and anxiety, history of GI bleed secondary to esophageal varices presented to [facility] emergency room with dark-colored stools ongoing for the last 1 week associated with some loose bowel movements.    Melena  Suspected upper GI bleed  Alcoholic liver disease  Esophageal varices   Acute blood loss anemia secondary to GI bleed  - GI consulted.  - EGD showed esophagitis, no significant esophageal varices.  - GI planning colonoscopy in AM.  - Continue oral protonix.  - Hemoglobin up to 10.1.   - Received two units PRBCs on 8/1/19.  - Continue rocephin for SBP prophylaxis.  - Monitor hemoglobin.  - CT abdomen/pelvis obtained today due to firm abdominal mass palpated on exam, was likely hepatomegaly. CT results reviewed with patient.     Alcohol dependence and alcohol withdrawal  - Patient complains of being shaky since stopped drinking 1 week ago.  - He will be placed on CIWA protocol with Ativan as needed dosing per CIWA protocol.  - Psychiatry consulted.    Depression/anxiety  - Continue PTA duloxetine and remeron.     Ascites  - S/p ultrasound guided paracentesis this morning with removal of 3.2 liters of fluid.  - Cell count not suggestive of SBP. Culture pending.  - Already on empiric rocephin for SBP prophylaxis as noted above.     Elevated LFTs  - Due to alcoholic liver disease.  - LFT's trending down.  - Recheck in AM.    Itchy eyes  - Artificial tears help only mildly.  - ? Allergic conjunctivitis, continue PRN antihistamine eye drop.    Hypokalemia  Hypomagnesemia  Hypophosphatemia  - Replace per protocol.    DVT Prophylaxis: Pneumatic Compression Devices  Code Status: DNR/DNI  Expected discharge: 2 - 3 days, recommended to prior living arrangement once GI evaluation complete and hemoglobin stable.    Arnaldo  MYRIAM Hughes MD  Text Page  (7am - 6pm, M-F)    Interval History   Jim Richard feels bloated. Some nausea, by no emesis. Had a BM this morning and continues to pas flatus. Denies fevers, chest pain, shortness of breath. Some tremors and anxiety.    -Data reviewed today: I reviewed all new labs and imaging results over the last 24 hours. I personally reviewed no images or EKG's today.    Physical Exam   Temp: 98  F (36.7  C) Temp src: Oral BP: 132/82 Pulse: 82 Heart Rate: 152 Resp: 13 SpO2: 91 % O2 Device: Nasal cannula Oxygen Delivery: 2 LPM  Vitals:    08/01/19 1618 08/03/19 0618   Weight: 90.7 kg (200 lb) 82.9 kg (182 lb 12.2 oz)     Vital Signs with Ranges  Temp:  [97.7  F (36.5  C)-98.3  F (36.8  C)] 98  F (36.7  C)  Pulse:  [] 82  Heart Rate:  [] 152  Resp:  [10-19] 13  BP: ()/(54-99) 132/82  SpO2:  [88 %-97 %] 91 %  I/O last 3 completed shifts:  In: 836 [P.O.:830; I.V.:6]  Out: 1025 [Urine:1025]    Constitutional: Awake, alert, cooperative, no apparent distress, sitting up in a chair, mild tremor  Respiratory: Clear to auscultation bilaterally, no crackles or wheezing  Cardiovascular: Regular rate and rhythm, normal S1 and S2, and no murmur noted, 2+ edema  GI: Normal bowel sounds, soft, mild distention, non-tender  Skin/Integument: No rashes, no cyanosis  HEENT: mild conjunctival redness    Medications     - MEDICATION INSTRUCTIONS -         lidocaine         atorvastatin  10 mg Oral At Bedtime     cefTRIAXone  2 g Intravenous Q24H     DULoxetine  60 mg Oral Daily     fluticasone-vilanterol  1 puff Inhalation Daily     folic acid  1 mg Oral Daily     mirtazapine  30 mg Oral At Bedtime     multivitamin w/minerals  1 tablet Oral Daily     pantoprazole  40 mg Oral QAM AC     polyethylene glycol  4,000 mL Oral or NG Tube Once     sodium chloride (PF)  3 mL Intracatheter Q8H     tamsulosin  0.4 mg Oral Daily     vitamin B1  100 mg Oral Daily     Data   Recent Labs   Lab 08/03/19  0526  08/02/19  2357 08/02/19  1556  08/02/19  0649 08/01/19  1629   WBC 3.5*  --   --   --  3.5* 3.8*   HGB 10.1* 10.1* 9.8*   < > 9.7* 8.7*   MCV 91  --   --   --  91 95   *  --   --   --  130* 155   INR  --   --   --   --   --  1.18*     --   --   --  133 132*   POTASSIUM 3.8  --  3.8  --  3.2* 3.8   CHLORIDE 99  --   --   --  97 95   CO2 28  --   --   --  26 27   BUN 18  --   --   --  22 23   CR 1.03  --   --   --  1.05 0.93   ANIONGAP 7  --   --   --  10 10   KEV 7.8*  --   --   --  7.8* 7.9*   *  --   --   --  85 75   ALBUMIN 2.1*  --   --   --  2.2* 2.3*   PROTTOTAL 5.7*  --   --   --  5.9* 6.5*   BILITOTAL 4.3*  --   --   --  6.3* 8.0*   ALKPHOS 269*  --   --   --  281* 305*   ALT 42  --   --   --  42 53   *  --   --   --  131* 183*    < > = values in this interval not displayed.     Recent Results (from the past 24 hour(s))   CT Abdomen Pelvis w Contrast    Narrative    CT ABDOMEN AND PELVIS WITH CONTRAST August 3, 2019 10:57 AM     HISTORY: Epigastric abdominal mass noted on physical exam.    COMPARISON: September 29, 2018.    TECHNIQUE: Volumetric helical acquisition of CT images from the lung  bases through the symphysis pubis after the administration of 92mL  Isovue-370  intravenous contrast. Radiation dose for this scan was  reduced using automated exposure control, adjustment of the mA and/or  kV according to patient size, or iterative reconstruction technique.    FINDINGS: There is an air distended transverse colon, of unclear  etiology. No small bowel dilatation. Marked hepatomegaly.  Heterogeneous enhancement of the liver and portal systemic collaterals  compatible with cirrhosis and portal hypertension. Extensive  gastroesophageal varices. Splenomegaly at 14.9 cm. Distended  gallbladder. Mild ascites. No hydronephrosis. Adrenal glands, kidneys,  and spleen demonstrate no worrisome focal lesion. There are extensive  atherosclerotic changes of the visualized aorta and its  branches.  There is no evidence of aortic dissection or aneurysm. Trace dependent  atelectasis and trace pleural fluid bilaterally.      Impression    IMPRESSION:  1. Marked hepatomegaly, heterogeneous liver compatible with cirrhosis.  2. Extensive portal systemic collaterals compatible with portal  hypertension including extensive gastroesophageal varices.  3. Distended gallbladder of uncertain etiology. No biliary dilatation  demonstrated.  4. Mild ascites.  5. Air filled transverse colon, felt unlikely to represent colonic  obstruction but this cannot be entirely excluded.     TACO LINDA MD

## 2019-08-03 NOTE — PLAN OF CARE
Shift 3756-1777: IMC status. VSS. A/O x 4. Lungs: diminished throughout, 1 L O2 NC. BS present; abdomen firm/distended/tender. No void this shift. Diet: Full liquid. Up assist of 1; GB. Pain managed w/ IV Dilaudid 0.3 mg. Hemoglobin check Q6-- Next @0000, scheduled @1800 lvl not taken, unsure as to why. CIWA: 3.    Plan: Unsure/unclear--No note for apparent EGD completed today. Dr. Arguello's note says plan for colonoscopy and EGD. No colonoscopy orders in, pt is not NPO or bowel prep heading into AM.

## 2019-08-03 NOTE — PLAN OF CARE
VSS, A&O, Lung sounds diminished. Bowel sounds active, passing gas. Abdomen is rounded. Paracentesis site- dressing CDI. CIWA 4. Scabs and abrasion noted in bilateral upper extremities. Patient attempted several times to urinate and was unable to. Bladder scan for over 550 ml. Straight cath for 650 ml- urine is very dark tea colored.  Abdominal pain controlled with IV Dilaudid.  Up with heavy assist of 2, shaky and weak, Can only walk short distances as he is weak and feels like his legs are going to give out. Jaundice, yellow sclera. CT of abdomen and pelvis. Full liquid diet.      Per Dr. Shaw start patient on clear liquids, will do bowel prep tonight for colonoscopy tomorrow. NPO at 12 Midnight.

## 2019-08-03 NOTE — PROVIDER NOTIFICATION
@1700: Called and left a voicemail for Dr. Shaw asking for a return phone call. I wanted to discuss the following: Started bowel prep. Pt is on CIWA, scored 8, 9--confusion increasing. Would need an order for NG tube, however pt has esophageal varices. Pt remains distended.    It is now 17:32--Since voice mail left: Ativan 1 mg PO given per CIWA protocol; pts confusion increasing--pulled out an IV, thinks pulse ox is a cigarette. Reorientation given several times. Planning for sitter.    Dr. Shaw return phone call @ 1740: Plan will be to cancel colonoscopy for tomorrow, stop bowel prep now. Dr. Shaw will see pt tomorrow and re-evaluate. Continue to addressing confusion w/ CIWA checks, hope for confusion to relax in the meantime.

## 2019-08-03 NOTE — PROGRESS NOTES
FYI: Vitals signs were not recorded from [id number] as IMC monitor remained associated w/ EGD space labs monitor upon checking and while it recorded vitals when hooked up, it ran out of batteries and was not plugged in so those were erased.

## 2019-08-03 NOTE — PLAN OF CARE
A+Ox4 AVSS on 2L O2. LS diminished. Tele NSR. BS active, flatus+. Voiding small amounts, bladder scanned for 286 ml. Abdomen distended. Paracentesis dressing CDI. Dilaudid for pain x2. Ativan x1 for CIWA scoring. BLE edematous. Scabs on arms from fall at home. NPO overnight. Neuropathy per baseline. Eye drops for itchiness. Up w/ 1 and walker.

## 2019-08-03 NOTE — PROVIDER NOTIFICATION
Call placed to Javid Wynne to clarify if patient will have repeat EGD and colonoscopy done today. If these are not going to be done today can patient start on full liquid diet?

## 2019-08-03 NOTE — PLAN OF CARE
"VSS on 1LPM via nasal cannula. Tele NSR. A/Ox4. CIWA score 5 on shift. Abdomen firm and distended. Paracentesis today with 3L of fluid taken off. EGD performed today with no overt findings. Pain controlled with PRN dilaudid. Up with Asstx1, ambulated to bathroom several times on shift, states he is \"feeling weak\".  Full liquid diet. BS active, Flatus +, no BM on shift. Voiding to bathroom, urine odorous and icteric tea colored. LS clear. Potassium, magnesium, and phos replaced on shift, all rechecks in for tomorrow AM.    "

## 2019-08-03 NOTE — PROGRESS NOTES
[facility]  Gastroenterology Progress Note     Jim Richard MRN# [id number]   YOB: 1955 Age: 64 year old          Assessment and Plan:     GI bleed  Very pleasant 64-year-old gentleman who was admitted due to history of black tarry stools patient has known history of alcoholic cirrhosis acute on chronic alcoholic hepatitis with elevated bilirubin and AST.  Patient does have abdominal ascites abdominal pain paracentesis was done yesterday which did not show any significant finding consistent with SBP.  Patient's upper endoscopy showed significant esophagitis no significant esophageal varices.  Patient is still complaining of abdominal discomfort patient has extensive bruises on his upper extremities patient is still shaky and tremulous.  Patient was able to respond appropriately to verbal command.  Patient had CT scan of the abdomen done earlier this morning the results of which are still pending.  At this point I will recommend the patient to be continued on clear liquid diet schedule him for colonoscopy tomorrow.  Continue on IV PPIs.  GI will continue to follow patient closely.         Gastrointestinal hemorrhage, unspecified gastrointestinal hemorrhage type      Interval History:   no new complaints, denies chest pain, denies shortness of breath, alert, oriented to person, place and time and has had a bowel movement in the last 24 hours              Review of Systems:   C: NEGATIVE for fever, chills, change in weight  E/M: NEGATIVE for ear, mouth and throat problems  R: NEGATIVE for significant cough or SOB  CV: NEGATIVE for chest pain, palpitations or peripheral edema             Medications:   I have reviewed this patient's current medications    lidocaine         atorvastatin  10 mg Oral At Bedtime     cefTRIAXone  2 g Intravenous Q24H     DULoxetine  60 mg Oral Daily     fluticasone-vilanterol  1 puff Inhalation Daily     folic acid  1 mg Oral Daily     mirtazapine  30 mg  Oral At Bedtime     multivitamin w/minerals  1 tablet Oral Daily     pantoprazole  40 mg Oral QAM AC     polyethylene glycol  4,000 mL Oral or NG Tube Once     sodium chloride (PF)  3 mL Intracatheter Q8H     tamsulosin  0.4 mg Oral Daily     vitamin B1  100 mg Oral Daily                  Physical Exam:   Vitals were reviewed  Vital Signs with Ranges  Temp:  [97.7  F (36.5  C)-98.3  F (36.8  C)] 97.7  F (36.5  C)  Pulse:  [] 72  Heart Rate:  [] 71  Resp:  [10-19] 13  BP: ()/(54-99) 126/76  SpO2:  [88 %-97 %] 94 %  I/O last 3 completed shifts:  In: 776 [P.O.:770; I.V.:6]  Out: 825 [Urine:825]  Constitutional: healthy, alert and no distress   Cardiovascular: negative, PMI normal. No lifts, heaves, or thrills. RRR. No murmurs, clicks gallops or rub  Respiratory: negative, Percussion normal. Good diaphragmatic excursion. Lungs clear  Head: Normocephalic. No masses, lesions, tenderness or abnormalities  Neck: Neck supple. No adenopathy. Thyroid symmetric, normal size,, Carotids without bruits.  Abdomen: Abdomen soft, non-tender. BS normal. No masses, organomegaly.  Significant distention and ascites.  SKIN: no suspicious lesions or rashes, jaundice           Data:   I reviewed the patient's new clinical lab test results.   Recent Labs   Lab Test 08/03/19  0546 08/02/19  2357 08/02/19  1556  08/02/19  0649 08/01/19  1629  09/29/18  0550 05/21/18  2247   WBC 3.5*  --   --   --  3.5* 3.8*   < > 6.1 6.1   HGB 10.1* 10.1* 9.8*   < > 9.7* 8.7*   < > 10.1* 8.2*   MCV 91  --   --   --  91 95   < > 74* 78   *  --   --   --  130* 155   < > 207 224   INR  --   --   --   --   --  1.18*  --  1.10 1.08    < > = values in this interval not displayed.     Recent Labs   Lab Test 08/03/19  0546 08/02/19  1556 08/02/19  0649 08/01/19  1629   POTASSIUM 3.8 3.8 3.2* 3.8   CHLORIDE 99  --  97 95   CO2 28  --  26 27   BUN 18  --  22 23   ANIONGAP 7  --  10 10     Recent Labs   Lab Test 08/03/19  0546 08/02/19  0649  08/01/19  1629  02/13/19  2340  09/29/18  0550  03/21/18  1555  09/25/14  0510  06/12/12  0550   ALBUMIN 2.1* 2.2* 2.3*   < > 3.1*   < > 3.2*   < > 2.9*   < >  --    < >  --    BILITOTAL 4.3* 6.3* 8.0*   < > 0.4   < > 1.0   < > 0.9   < >  --    < >  --    ALT 42 42 53   < > 41   < > 60   < > 74*   < >  --    < >  --    * 131* 183*   < > 89*   < > 140*   < > 136*   < >  --    < >  --    PROTEIN  --   --   --   --   --   --   --   --   --   --  Negative  --  Negative   LIPASE  --   --   --   --  536*  --  585*  --  316  --   --   --   --     < > = values in this interval not displayed.       I reviewed the patient's new imaging results.    All laboratory data reviewed  All imaging studies reviewed by me.    Rosendo Shaw MD,  8/3/2019  [facility]  Office : [phone]  Cell: [phone]

## 2019-08-03 NOTE — CONSULTS
"The writer met with the patient introduced herself and her role. Pt reported he has been to treatment \"many times\" most recently with [facility] IP tx. Pt reported he found out that Medicare has limited treatment options which consist of either [facility] or [facility]. Pt reported he knows what he need to do and is not interested in treatment. Pt reported in the past he had been on Campral and Antabuse. Pt reported he would like to discuss options with psychiatry and discuss his \"depression medications\" with the psychiatrist. Pt declined an assessment as he is not interested in treatment. The writer discussed the [facility] with the patient which he could inquiry with them about providers and options as they accept Medicare and provide individual based services. The writer gave the patient her business card to the patient and wrote down the [facility] telephone number and [facility] telephone number if he changed his mind. The writer told the patient to call her if he had questions or wanted sober support group meetings information.   "

## 2019-08-04 LAB
AMMONIA PLAS-SCNC: 75 UMOL/L (ref 10–50)
ANION GAP SERPL CALCULATED.3IONS-SCNC: 5 MMOL/L (ref 3–14)
BUN SERPL-MCNC: 18 MG/DL (ref 7–30)
CALCIUM SERPL-MCNC: 8.2 MG/DL (ref 8.5–10.1)
CHLORIDE SERPL-SCNC: 98 MMOL/L (ref 94–109)
CO2 SERPL-SCNC: 29 MMOL/L (ref 20–32)
CREAT SERPL-MCNC: 1.08 MG/DL (ref 0.66–1.25)
ERYTHROCYTE [DISTWIDTH] IN BLOOD BY AUTOMATED COUNT: 17.8 % (ref 10–15)
GFR SERPL CREATININE-BSD FRML MDRD: 71 ML/MIN/{1.73_M2}
GLUCOSE SERPL-MCNC: 107 MG/DL (ref 70–99)
HCT VFR BLD AUTO: 28.6 % (ref 40–53)
HGB BLD-MCNC: 9.8 G/DL (ref 13.3–17.7)
MAGNESIUM SERPL-MCNC: 1.5 MG/DL (ref 1.6–2.3)
MAGNESIUM SERPL-MCNC: 2.5 MG/DL (ref 1.6–2.3)
MCH RBC QN AUTO: 30.9 PG (ref 26.5–33)
MCHC RBC AUTO-ENTMCNC: 34.3 G/DL (ref 31.5–36.5)
MCV RBC AUTO: 90 FL (ref 78–100)
PHOSPHATE SERPL-MCNC: 2.7 MG/DL (ref 2.5–4.5)
PLATELET # BLD AUTO: 117 10E9/L (ref 150–450)
POTASSIUM SERPL-SCNC: 4.1 MMOL/L (ref 3.4–5.3)
RBC # BLD AUTO: 3.17 10E12/L (ref 4.4–5.9)
SODIUM SERPL-SCNC: 132 MMOL/L (ref 133–144)
WBC # BLD AUTO: 5.3 10E9/L (ref 4–11)

## 2019-08-04 PROCEDURE — 80048 BASIC METABOLIC PNL TOTAL CA: CPT | Performed by: HOSPITALIST

## 2019-08-04 PROCEDURE — 25000128 H RX IP 250 OP 636: Performed by: HOSPITALIST

## 2019-08-04 PROCEDURE — 99232 SBSQ HOSP IP/OBS MODERATE 35: CPT | Performed by: HOSPITALIST

## 2019-08-04 PROCEDURE — 12000000 ZZH R&B MED SURG/OB

## 2019-08-04 PROCEDURE — 25800030 ZZH RX IP 258 OP 636: Performed by: HOSPITALIST

## 2019-08-04 PROCEDURE — 84100 ASSAY OF PHOSPHORUS: CPT | Performed by: HOSPITALIST

## 2019-08-04 PROCEDURE — 25000132 ZZH RX MED GY IP 250 OP 250 PS 637: Mod: GY | Performed by: HOSPITALIST

## 2019-08-04 PROCEDURE — 82140 ASSAY OF AMMONIA: CPT | Performed by: INTERNAL MEDICINE

## 2019-08-04 PROCEDURE — 85027 COMPLETE CBC AUTOMATED: CPT | Performed by: HOSPITALIST

## 2019-08-04 PROCEDURE — 25000132 ZZH RX MED GY IP 250 OP 250 PS 637: Mod: GY | Performed by: INTERNAL MEDICINE

## 2019-08-04 PROCEDURE — 12000047 ZZH R&B IMCU

## 2019-08-04 PROCEDURE — 36415 COLL VENOUS BLD VENIPUNCTURE: CPT | Performed by: INTERNAL MEDICINE

## 2019-08-04 PROCEDURE — 36415 COLL VENOUS BLD VENIPUNCTURE: CPT | Performed by: HOSPITALIST

## 2019-08-04 PROCEDURE — 25000128 H RX IP 250 OP 636: Performed by: INTERNAL MEDICINE

## 2019-08-04 PROCEDURE — 83735 ASSAY OF MAGNESIUM: CPT | Performed by: HOSPITALIST

## 2019-08-04 RX ORDER — SODIUM CHLORIDE 9 MG/ML
INJECTION, SOLUTION INTRAVENOUS CONTINUOUS
Status: DISCONTINUED | OUTPATIENT
Start: 2019-08-04 | End: 2019-08-09

## 2019-08-04 RX ADMIN — LORAZEPAM 1 MG: 0.5 TABLET ORAL at 19:36

## 2019-08-04 RX ADMIN — TAMSULOSIN HYDROCHLORIDE 0.4 MG: 0.4 CAPSULE ORAL at 10:01

## 2019-08-04 RX ADMIN — FLUTICASONE FUROATE AND VILANTEROL TRIFENATATE 1 PUFF: 200; 25 POWDER RESPIRATORY (INHALATION) at 10:02

## 2019-08-04 RX ADMIN — LORAZEPAM 1 MG: 0.5 TABLET ORAL at 21:42

## 2019-08-04 RX ADMIN — ATORVASTATIN CALCIUM 10 MG: 10 TABLET, FILM COATED ORAL at 21:33

## 2019-08-04 RX ADMIN — CEFTRIAXONE SODIUM 2 G: 2 INJECTION, POWDER, FOR SOLUTION INTRAMUSCULAR; INTRAVENOUS at 17:03

## 2019-08-04 RX ADMIN — LORAZEPAM 1 MG: 2 INJECTION INTRAMUSCULAR; INTRAVENOUS at 03:11

## 2019-08-04 RX ADMIN — LORAZEPAM 1 MG: 0.5 TABLET ORAL at 17:03

## 2019-08-04 RX ADMIN — MULTIPLE VITAMINS W/ MINERALS TAB 1 TABLET: TAB at 10:01

## 2019-08-04 RX ADMIN — MIRTAZAPINE 30 MG: 30 TABLET, FILM COATED ORAL at 01:45

## 2019-08-04 RX ADMIN — MIRTAZAPINE 30 MG: 30 TABLET, FILM COATED ORAL at 21:33

## 2019-08-04 RX ADMIN — LORAZEPAM 1 MG: 0.5 TABLET ORAL at 23:06

## 2019-08-04 RX ADMIN — SODIUM CHLORIDE: 9 INJECTION, SOLUTION INTRAVENOUS at 15:09

## 2019-08-04 RX ADMIN — MAGNESIUM SULFATE IN WATER 4 G: 40 INJECTION, SOLUTION INTRAVENOUS at 08:28

## 2019-08-04 RX ADMIN — LORAZEPAM 1 MG: 0.5 TABLET ORAL at 20:54

## 2019-08-04 RX ADMIN — FOLIC ACID 1 MG: 1 TABLET ORAL at 10:01

## 2019-08-04 RX ADMIN — Medication 100 MG: at 10:01

## 2019-08-04 RX ADMIN — NADOLOL 40 MG: 40 TABLET ORAL at 10:01

## 2019-08-04 RX ADMIN — LORAZEPAM 1 MG: 0.5 TABLET ORAL at 18:39

## 2019-08-04 RX ADMIN — DEXTRAN 70, AND HYPROMELLOSE 2910 2 DROP: 1; 3 SOLUTION/ DROPS OPHTHALMIC at 23:01

## 2019-08-04 RX ADMIN — PANTOPRAZOLE SODIUM 40 MG: 40 TABLET, DELAYED RELEASE ORAL at 07:03

## 2019-08-04 RX ADMIN — DULOXETINE HYDROCHLORIDE 60 MG: 60 CAPSULE, DELAYED RELEASE ORAL at 10:01

## 2019-08-04 RX ADMIN — LORAZEPAM 2 MG: 2 INJECTION INTRAMUSCULAR; INTRAVENOUS at 02:19

## 2019-08-04 RX ADMIN — ACETAMINOPHEN 650 MG: 325 TABLET, FILM COATED ORAL at 17:03

## 2019-08-04 RX ADMIN — LORAZEPAM 2 MG: 0.5 TABLET ORAL at 01:04

## 2019-08-04 ASSESSMENT — ACTIVITIES OF DAILY LIVING (ADL)
ADLS_ACUITY_SCORE: 10.5

## 2019-08-04 NOTE — PLAN OF CARE
Shift 2070-9680: VSS. Disorientated to place, time, situation; confused--CIWA: 8-9's throughout majority of shift, most recent is 12--Ativan 6 mg given in total, PRN haldol 5 mg x 1. Lungs: diminished throughout, 2 L O2 NC. BS hypo, distended/firm, no flatus, no BM. Straight cath x 1: 275 ml output; tea-colored/odorous; Legre now in place for retention. Diet: clear. No pain meds given but aforementioned Ativan has been given essentially Q 1. Labs: Ammonia lvl: 47 this evening, re-check in AM.    Plan:   1. Colonoscopy tomorrow cancelled, bowel prep started this evening was stopped per Dr. Shaw order--all r/t pt's increase confusion/CIWA scores. MD will re-evaluate pt in AM, please refer to his note.  2. Pt was to transfer to ICU per Dr. Lucas order @6069 this evening. ICU staff questioned decision and also stated only one bed available as another reason, however it was actually a staffing question as they would need to go up an ICU RN. Flyer available to pull if needed, however. Also mentioned an ER admit, but that pt is coming to [facility], however. Please refer to xcoverage Dr. Hannah note for current pt plan as pt will remain on [facility] for now.

## 2019-08-04 NOTE — PROGRESS NOTES
[facility]    Hospitalist Progress Note    Assessment & Plan   Jim Richard is a 64 year old male with history of alcohol dependence, alcoholic liver disease, depression and anxiety, history of GI bleed secondary to esophageal varices presented to [facility] emergency room with dark-colored stools ongoing for the last 1 week associated with some loose bowel movements.    Melena  Suspected upper GI bleed  Alcoholic liver disease  Esophageal varices   Acute blood loss anemia secondary to GI bleed  - GI consulted.  - EGD showed esophagitis, no significant esophageal varices.  - GI planning colonoscopy when able to tolerate prep.  - Continue oral protonix.  - Hemoglobin stable at 9.8 this morning.   - Received two units PRBCs on 8/1/19.  - Continue rocephin for SBP prophylaxis (day #4).  - Monitor hemoglobin.  - CT abdomen/pelvis obtained 8/3/19 due to firm abdominal mass palpated on exam, was likely hepatomegaly. CT results reviewed with patient.     Alcohol dependence and alcohol withdrawal  - Patient complains of being shaky since stopped drinking 1 week ago.  - He will be placed on CIWA protocol with Ativan as needed dosing per CIWA protocol.  - Having significant withdrawal symptoms now and has required large doses of lorazepam.  - Continue CIWA protocol.  - Has a one-to-one sitter now.  - Psychiatry consulted.    Depression/anxiety  - Continue PTA duloxetine and remeron.     Ascites  - S/p ultrasound guided paracentesis this morning with removal of 3.2 liters of fluid.  - Cell count not suggestive of SBP. Culture pending.  - Already on empiric rocephin (day #4) for SBP prophylaxis as noted above.     Elevated LFTs  - Due to alcoholic liver disease.  - LFT's trending down as of 8/3/19.    Itchy eyes  - Artificial tears help only mildly.  - ? Allergic conjunctivitis, continue PRN antihistamine eye drop.    Hypokalemia  Hypomagnesemia  Hypophosphatemia  - Replace per protocol.    DVT  Prophylaxis: Pneumatic Compression Devices  Code Status: DNR/DNI  Expected discharge: 2 - 3 days, recommended to prior living arrangement once GI evaluation complete and hemoglobin stable.    Arnaldo Hughes MD  Text Page  (7am - 6pm, M-F)    Interval History   Jim Richard had significant alcohol withdrawal symptoms overnight. Required multiple doses of ativan and a dose of haldol. Now has a sitter. Needed soft wrist restraints overnight. Has been drowsy this morning after receiving significant doses of ativan overnight. He feels bloated. Some mild discomfort in his abdomen. Denies fevers, chest pain, shortness of breath, and nausea.    -Data reviewed today: I reviewed all new labs and imaging results over the last 24 hours. I personally reviewed no images or EKG's today.    Physical Exam   Temp: 98.3  F (36.8  C) Temp src: Oral BP: 107/65 Pulse: 58 Heart Rate: 59 Resp: 18 SpO2: 97 % O2 Device: Nasal cannula Oxygen Delivery: 1 LPM  Vitals:    08/01/19 1618 08/03/19 0618 08/04/19 0810   Weight: 90.7 kg (200 lb) 82.9 kg (182 lb 12.2 oz) 83.8 kg (184 lb 11.9 oz)     Vital Signs with Ranges  Temp:  [98  F (36.7  C)-98.9  F (37.2  C)] 98.3  F (36.8  C)  Pulse:  [58-99] 58  Heart Rate:  [] 59  Resp:  [7-29] 18  BP: (107-135)/() 107/65  SpO2:  [86 %-97 %] 97 %  I/O last 3 completed shifts:  In: 563 [P.O.:560; I.V.:3]  Out: 925 [Urine:925]    Constitutional: Awake, drowsy, cooperative, no apparent distress, laying in bed, tremulous  Respiratory: Clear to auscultation bilaterally, no crackles or wheezing  Cardiovascular: Regular rate and rhythm, normal S1 and S2, and no murmur noted, 2+ edema  GI: Normal bowel sounds, soft, distended, mild generalized tenderness, no rebound tenderness or guarding  Skin/Integument: No rashes, no cyanosis  Neurologic: oriented to person and place, was able to name the hospital. Thought the year was 1954. Knew that Virgilio Flores is president.    Medications     - MEDICATION  INSTRUCTIONS -         atorvastatin  10 mg Oral At Bedtime     cefTRIAXone  2 g Intravenous Q24H     DULoxetine  60 mg Oral Daily     fluticasone-vilanterol  1 puff Inhalation Daily     folic acid  1 mg Oral Daily     mirtazapine  30 mg Oral At Bedtime     multivitamin w/minerals  1 tablet Oral Daily     nadolol  40 mg Oral Daily     pantoprazole  40 mg Oral QAM AC     polyethylene glycol  4,000 mL Oral or NG Tube Once     sodium chloride (PF)  3 mL Intracatheter Q8H     tamsulosin  0.4 mg Oral Daily     vitamin B1  100 mg Oral Daily     Data   Recent Labs   Lab 08/04/19  0526 08/03/19  0546 08/02/19  2357 08/02/19  1556  08/02/19  0649 08/01/19  1629   WBC 5.3 3.5*  --   --   --  3.5* 3.8*   HGB 9.8* 10.1* 10.1* 9.8*   < > 9.7* 8.7*   MCV 90 91  --   --   --  91 95   * 120*  --   --   --  130* 155   INR  --   --   --   --   --   --  1.18*   * 134  --   --   --  133 132*   POTASSIUM 4.1 3.8  --  3.8  --  3.2* 3.8   CHLORIDE 98 99  --   --   --  97 95   CO2 29 28  --   --   --  26 27   BUN 18 18  --   --   --  22 23   CR 1.08 1.03  --   --   --  1.05 0.93   ANIONGAP 5 7  --   --   --  10 10   KEV 8.2* 7.8*  --   --   --  7.8* 7.9*   * 146*  --   --   --  85 75   ALBUMIN  --  2.1*  --   --   --  2.2* 2.3*   PROTTOTAL  --  5.7*  --   --   --  5.9* 6.5*   BILITOTAL  --  4.3*  --   --   --  6.3* 8.0*   ALKPHOS  --  269*  --   --   --  281* 305*   ALT  --  42  --   --   --  42 53   AST  --  108*  --   --   --  131* 183*    < > = values in this interval not displayed.     No results found for this or any previous visit (from the past 24 hour(s)).

## 2019-08-04 NOTE — PLAN OF CARE
VSS, 91- 96 %/1L oxygen. Disoriented to time, place and situation, illogical at times. Lethargic, open eye with sternal rubs at the beginning of the shift, now will open to voice.  Lung sounds diminished. Bowel sounds hypoactive, passing gas. Large incontinent watery brown stool. Abdomen is round/distened but soft. Right side quadrant pain with palpation. Coccyx is red but blanchable.   Paracentesis dressing CDI. ALEISHA from jiang- dark tea colored (updated MD), Baseline numbness and tingling. Pulses present. Restraints discontinued. Clear liquids- tolerating, needing assistance with eating. Sitter at bedside. Up with heavy assist of 2. Turn and reposition every 2 hours and PRN.

## 2019-08-04 NOTE — PLAN OF CARE
OT:Orders received, chart reviewed. Per chart/PT,pt. is not appropriate for OT/PT intervention today. Pt. has increasing CIWA scores, is confused and agitated at times. Will follow, reschedule to 8/5.

## 2019-08-04 NOTE — PROGRESS NOTES
Xcoverage: paged by RN regarding the patient's status; he was noted to score higher on CIWA 8-9 today, required 6 mg Ativan in the last 10 hours; initial plan was for transfer to ICU; discussed with flying squad RN and it seems that there is only 1 ICU bed available; examined the patient- he is confused, oriented to self only, he is very tremulous; vitals are stable; /90, HR 92, RR 17, O2 sat 94% on O2 2 liters; the patient seems to be stable to be closely monitored in IMC at this time; continue with CIWA, Haldol prn, sitter at bedside; mittens placed. If he would have worsening confusion, increased CIWA score, sedation, decreased respiratory rate<8- would need to be transferred to ICU.    Park Hannah MD

## 2019-08-04 NOTE — PROVIDER NOTIFICATION
"Txt pg, on-call hospitalist, Dr. Lucas: \"Rm 316-1, Jim Richard: Following Haldol, CIWA is 8 for tremors=4, disorientated to place/time/situ= 4. Pt more drowsy, but otherwise the same since I paged. VASQUEZ Ruiz\"    Result: Placed transfer to ICU.  "

## 2019-08-04 NOTE — PROVIDER NOTIFICATION
"Txt-pg Dr. Hannah, who is taking over on-call Hospitalist for [facility]: \"-1, Jose Manuel, R: Dr. Lucas placed transfer to ICU just now r/t CIWA of 8's, 9's, increased confusion and not improving following 5 mg Ativan+haldol since 1400. ICU asking if pt needs to transfer, would be last bed.\"  "

## 2019-08-04 NOTE — PROGRESS NOTES
[facility]  Gastroenterology Progress Note     Jim Richard MRN# [id number]   YOB: 1955 Age: 64 year old          Assessment and Plan:     GI bleed  Patient is very sleepy patient is getting Ativan for alcohol withdrawal patient has abdominal distention which is fairly unchanged patient labs are stable clinically patient is stable.  I will recommend to continue to monitor continue him on his current treatment plan once patient is able to tolerate prep at that point I would recommend him to be evaluated with colonoscopy.  He will then continue to monitor his hemoglobin.         Gastrointestinal hemorrhage, unspecified gastrointestinal hemorrhage type      Interval History:   doing well; no cp, sob, n/v/d, or abd pain.              Review of Systems:   C: NEGATIVE for fever, chills, change in weight  E/M: NEGATIVE for ear, mouth and throat problems  R: NEGATIVE for significant cough or SOB  CV: NEGATIVE for chest pain, palpitations or peripheral edema             Medications:   I have reviewed this patient's current medications    atorvastatin  10 mg Oral At Bedtime     cefTRIAXone  2 g Intravenous Q24H     DULoxetine  60 mg Oral Daily     fluticasone-vilanterol  1 puff Inhalation Daily     folic acid  1 mg Oral Daily     mirtazapine  30 mg Oral At Bedtime     multivitamin w/minerals  1 tablet Oral Daily     nadolol  40 mg Oral Daily     pantoprazole  40 mg Oral QAM AC     polyethylene glycol  4,000 mL Oral or NG Tube Once     sodium chloride (PF)  3 mL Intracatheter Q8H     tamsulosin  0.4 mg Oral Daily     vitamin B1  100 mg Oral Daily                  Physical Exam:   Vitals were reviewed  Vital Signs with Ranges  Temp:  [98  F (36.7  C)-98.9  F (37.2  C)] 98.3  F (36.8  C)  Pulse:  [80-99] 80  Heart Rate:  [] 77  Resp:  [7-29] 18  BP: (119-135)/() 126/81  SpO2:  [86 %-96 %] 95 %  I/O last 3 completed shifts:  In: 563 [P.O.:560; I.V.:3]  Out: 925  [Urine:925]  Constitutional: healthy, alert and no distress   Cardiovascular: negative, PMI normal. No lifts, heaves, or thrills. RRR. No murmurs, clicks gallops or rub  Respiratory: negative, Percussion normal. Good diaphragmatic excursion. Lungs clear  Abdomen: Abdomen soft, non-tender. BS normal. No masses, organomegaly           Data:   I reviewed the patient's new clinical lab test results.   Recent Labs   Lab Test 08/04/19  0526 08/03/19  0546 08/02/19  2357  08/02/19  0649 08/01/19  1629  09/29/18  0550 05/21/18  2247   WBC 5.3 3.5*  --   --  3.5* 3.8*   < > 6.1 6.1   HGB 9.8* 10.1* 10.1*   < > 9.7* 8.7*   < > 10.1* 8.2*   MCV 90 91  --   --  91 95   < > 74* 78   * 120*  --   --  130* 155   < > 207 224   INR  --   --   --   --   --  1.18*  --  1.10 1.08    < > = values in this interval not displayed.     Recent Labs   Lab Test 08/04/19  0526 08/03/19  0546 08/02/19  1556 08/02/19  0649   POTASSIUM 4.1 3.8 3.8 3.2*   CHLORIDE 98 99  --  97   CO2 29 28  --  26   BUN 18 18  --  22   ANIONGAP 5 7  --  10     Recent Labs   Lab Test 08/03/19  0546 08/02/19  0649 08/01/19  1629  02/13/19  2340  09/29/18  0550  03/21/18  1555  09/25/14  0510  06/12/12  0550   ALBUMIN 2.1* 2.2* 2.3*   < > 3.1*   < > 3.2*   < > 2.9*   < >  --    < >  --    BILITOTAL 4.3* 6.3* 8.0*   < > 0.4   < > 1.0   < > 0.9   < >  --    < >  --    ALT 42 42 53   < > 41   < > 60   < > 74*   < >  --    < >  --    * 131* 183*   < > 89*   < > 140*   < > 136*   < >  --    < >  --    PROTEIN  --   --   --   --   --   --   --   --   --   --  Negative  --  Negative   LIPASE  --   --   --   --  536*  --  585*  --  316  --   --   --   --     < > = values in this interval not displayed.       I reviewed the patient's new imaging results.    All laboratory data reviewed  All imaging studies reviewed by me.    Rosendo Shaw MD,  8/4/2019  [facility]  Office : [phone]  Cell: [phone]

## 2019-08-04 NOTE — PLAN OF CARE
PT: Orders received, chart reviewed, spoke with RN caring for patient today. Pt is not appropriate for PT intervention today, increasing CIWA scores, confused, agitated at times. Will continue to follow.

## 2019-08-04 NOTE — PROVIDER NOTIFICATION
"Txt pg on-call hospitalist, Dr. Lucas: \"-1, Jim Richard: Pt has CIWA checks, scored 8, now 9 last three checks. Given 4 total grams of Ativan per protocol total since 1600. Confusion not improving. Do I continue Ativan doses? Should we check ammonia?\"    Return phone call: Give PRN Haldol now, check ammonia level now. Call back if no improvement.  "

## 2019-08-04 NOTE — PLAN OF CARE
Disoriented to situation and time. AVSS on 2L O2. LS clear. Tele NSR. BS active, flatus+, abdomen distended and round, paracentesis dressing CDI. Leger w/ low UOP. BLE edematous. Ativan given x3, 5mg total for the shift. Tolerating clears. Sitter at bedside.

## 2019-08-04 NOTE — PROGRESS NOTES
Discontinue prep for colonoscopy due to worsening of confusion and DTs.  Ct scan reviewed.  Check Ammonia level.  Start on Nadolol for portal HTN and Varices.    Rosendo Shaw MD

## 2019-08-04 NOTE — PROVIDER NOTIFICATION
"Txt-pg Dr. Hannah: \", Jim Richard: Could you call back to [facility]. Want to clarify pt status with you after ICU RN talked to you. VASQUEZ Ruiz\"    Result: Dr. Hannah came up to assess pt in person. Please refer to her note.  "

## 2019-08-04 NOTE — PROVIDER NOTIFICATION
"Txt-pg hospitalist, Dr. Hughes: \"FYI-- Ammonia lvl: 75 @ 9123, up from last evenings check of 47\"    Result: No reply  "

## 2019-08-05 LAB
ALBUMIN SERPL-MCNC: 2 G/DL (ref 3.4–5)
ALP SERPL-CCNC: 276 U/L (ref 40–150)
ALT SERPL W P-5'-P-CCNC: 46 U/L (ref 0–70)
AMMONIA PLAS-SCNC: 16 UMOL/L (ref 10–50)
AMMONIA PLAS-SCNC: 24 UMOL/L (ref 10–50)
ANION GAP SERPL CALCULATED.3IONS-SCNC: 6 MMOL/L (ref 3–14)
AST SERPL W P-5'-P-CCNC: 99 U/L (ref 0–45)
BILIRUB SERPL-MCNC: 4.6 MG/DL (ref 0.2–1.3)
BUN SERPL-MCNC: 14 MG/DL (ref 7–30)
CALCIUM SERPL-MCNC: 8 MG/DL (ref 8.5–10.1)
CHLORIDE SERPL-SCNC: 100 MMOL/L (ref 94–109)
CO2 SERPL-SCNC: 28 MMOL/L (ref 20–32)
CREAT SERPL-MCNC: 0.86 MG/DL (ref 0.66–1.25)
ERYTHROCYTE [DISTWIDTH] IN BLOOD BY AUTOMATED COUNT: 17.8 % (ref 10–15)
GFR SERPL CREATININE-BSD FRML MDRD: >90 ML/MIN/{1.73_M2}
GLUCOSE SERPL-MCNC: 92 MG/DL (ref 70–99)
HCT VFR BLD AUTO: 33.5 % (ref 40–53)
HGB BLD-MCNC: 11 G/DL (ref 13.3–17.7)
MAGNESIUM SERPL-MCNC: 1.9 MG/DL (ref 1.6–2.3)
MCH RBC QN AUTO: 29.8 PG (ref 26.5–33)
MCHC RBC AUTO-ENTMCNC: 32.8 G/DL (ref 31.5–36.5)
MCV RBC AUTO: 91 FL (ref 78–100)
PHOSPHATE SERPL-MCNC: 2.8 MG/DL (ref 2.5–4.5)
PLATELET # BLD AUTO: 171 10E9/L (ref 150–450)
POTASSIUM SERPL-SCNC: 3.5 MMOL/L (ref 3.4–5.3)
PROT SERPL-MCNC: 5.7 G/DL (ref 6.8–8.8)
RBC # BLD AUTO: 3.69 10E12/L (ref 4.4–5.9)
SODIUM SERPL-SCNC: 134 MMOL/L (ref 133–144)
WBC # BLD AUTO: 6 10E9/L (ref 4–11)

## 2019-08-05 PROCEDURE — 12000000 ZZH R&B MED SURG/OB

## 2019-08-05 PROCEDURE — 12000047 ZZH R&B IMCU

## 2019-08-05 PROCEDURE — 25000132 ZZH RX MED GY IP 250 OP 250 PS 637: Mod: GY | Performed by: INTERNAL MEDICINE

## 2019-08-05 PROCEDURE — 85027 COMPLETE CBC AUTOMATED: CPT | Performed by: HOSPITALIST

## 2019-08-05 PROCEDURE — 25000132 ZZH RX MED GY IP 250 OP 250 PS 637: Mod: GY | Performed by: HOSPITALIST

## 2019-08-05 PROCEDURE — 36415 COLL VENOUS BLD VENIPUNCTURE: CPT | Performed by: HOSPITALIST

## 2019-08-05 PROCEDURE — 82140 ASSAY OF AMMONIA: CPT | Performed by: INTERNAL MEDICINE

## 2019-08-05 PROCEDURE — 83735 ASSAY OF MAGNESIUM: CPT | Performed by: HOSPITALIST

## 2019-08-05 PROCEDURE — 84100 ASSAY OF PHOSPHORUS: CPT | Performed by: HOSPITALIST

## 2019-08-05 PROCEDURE — 80053 COMPREHEN METABOLIC PANEL: CPT | Performed by: HOSPITALIST

## 2019-08-05 PROCEDURE — 25000128 H RX IP 250 OP 636: Performed by: INTERNAL MEDICINE

## 2019-08-05 PROCEDURE — 99232 SBSQ HOSP IP/OBS MODERATE 35: CPT | Performed by: HOSPITALIST

## 2019-08-05 PROCEDURE — 25800030 ZZH RX IP 258 OP 636: Performed by: HOSPITALIST

## 2019-08-05 RX ADMIN — SODIUM CHLORIDE: 9 INJECTION, SOLUTION INTRAVENOUS at 04:41

## 2019-08-05 RX ADMIN — LORAZEPAM 1 MG: 2 INJECTION INTRAMUSCULAR; INTRAVENOUS at 02:08

## 2019-08-05 RX ADMIN — FOLIC ACID 1 MG: 1 TABLET ORAL at 12:57

## 2019-08-05 RX ADMIN — LORAZEPAM 1 MG: 2 INJECTION INTRAMUSCULAR; INTRAVENOUS at 05:25

## 2019-08-05 RX ADMIN — ATORVASTATIN CALCIUM 10 MG: 10 TABLET, FILM COATED ORAL at 22:15

## 2019-08-05 RX ADMIN — PANTOPRAZOLE SODIUM 40 MG: 40 TABLET, DELAYED RELEASE ORAL at 12:57

## 2019-08-05 RX ADMIN — CEFTRIAXONE SODIUM 2 G: 2 INJECTION, POWDER, FOR SOLUTION INTRAMUSCULAR; INTRAVENOUS at 18:48

## 2019-08-05 RX ADMIN — DULOXETINE HYDROCHLORIDE 60 MG: 60 CAPSULE, DELAYED RELEASE ORAL at 13:04

## 2019-08-05 RX ADMIN — LORAZEPAM 1 MG: 2 INJECTION INTRAMUSCULAR; INTRAVENOUS at 00:26

## 2019-08-05 RX ADMIN — MULTIPLE VITAMINS W/ MINERALS TAB 1 TABLET: TAB at 12:51

## 2019-08-05 RX ADMIN — TAMSULOSIN HYDROCHLORIDE 0.4 MG: 0.4 CAPSULE ORAL at 12:49

## 2019-08-05 RX ADMIN — MIRTAZAPINE 30 MG: 30 TABLET, FILM COATED ORAL at 22:15

## 2019-08-05 RX ADMIN — Medication 100 MG: at 12:51

## 2019-08-05 RX ADMIN — SODIUM CHLORIDE: 9 INJECTION, SOLUTION INTRAVENOUS at 16:23

## 2019-08-05 RX ADMIN — NADOLOL 40 MG: 40 TABLET ORAL at 12:51

## 2019-08-05 ASSESSMENT — ACTIVITIES OF DAILY LIVING (ADL)
ADLS_ACUITY_SCORE: 10.5
ADLS_ACUITY_SCORE: 15
ADLS_ACUITY_SCORE: 10.5
ADLS_ACUITY_SCORE: 15

## 2019-08-05 NOTE — PLAN OF CARE
PT/OT: Checked in with nursing this AM, pt sleeping soundly, per nurse therapy to try back in PM as pt not appropriate this morning. Will hold therapy this AM

## 2019-08-05 NOTE — PROGRESS NOTES
[facility]    Hospitalist Progress Note    Assessment & Plan   Jim Richard is a 64 year old male with history of alcohol dependence, alcoholic liver disease, depression and anxiety, history of GI bleed secondary to esophageal varices presented to [facility] emergency room with dark-colored stools ongoing for the last 1 week associated with some loose bowel movements.    Melena  Suspected upper GI bleed  Alcoholic liver disease  Esophageal varices   Acute blood loss anemia secondary to GI bleed  - GI consulted, appreciate their assistance.  - EGD showed esophagitis, no significant esophageal varices.  - GI planning colonoscopy when able to tolerate prep.  - Continue oral protonix.  - Hemoglobin up to 11.0 this morning.   - Received two units PRBCs on 8/1/19.  - Continue rocephin for SBP prophylaxis (day #5/7).  - Nadolol added on 8/4/19 for portal hypertension and varices.  - Ammonia mildly elevated yesterday, trending down this morning. ? element of hepatic encephalopathy, appreciate GI input.  - Monitor hemoglobin.  - CT abdomen/pelvis obtained 8/3/19 due to firm abdominal mass palpated on exam, was likely hepatomegaly.  - Continue IV fluids until reliably tolerating oral intake.     Alcohol dependence and alcohol withdrawal  - Patient complains of being shaky since stopped drinking 1 week ago.  - He was placed on CIWA protocol with Ativan as needed dosing per CIWA protocol.  - Having significant withdrawal symptoms over the past 48 hours and has required large doses of lorazepam. CIWA scores range from 4-12 over the past 24 hours, he has received 9 mg of lorazepam during that timeframe.  - Has a one-to-one sitter now.  - Continue thiamine, folic acid, and multivitamin.  - Psychiatry consulted.    Depression/anxiety  - Continue PTA duloxetine and remeron.     Ascites  - S/p ultrasound guided paracentesis on 8/2/19 with removal of 3.2 liters of fluid.  - Cell count not suggestive of  SBP. Culture pending, negative to date.  - Already on empiric rocephin (day #5/7) for SBP prophylaxis as noted above.     Elevated LFTs  - Due to alcoholic liver disease.  - LFT's slowly trending down.    Itchy eyes  - Artificial tears help only mildly.  - ? Allergic conjunctivitis, continue PRN antihistamine eye drop.    Hypokalemia  Hypomagnesemia  Hypophosphatemia  - Replace per protocol.  - Within normal limits this morning.    DVT Prophylaxis: Pneumatic Compression Devices  Code Status: DNR/DNI  Expected discharge: 2 - 3 days, recommended to prior living arrangement once GI evaluation complete and no longer having alcohol withdrawal symptoms.    Arnaldo Hughes MD  Text Page  (7am - 6pm, M-F)    Interval History   Jim Richard continues to have significant withdrawal symptoms. Has received 9 mg of PRN lorazepam in the past 24 hours. Has a one-to-one sitter in the room with him. Denies fevers, chest pain, shortness of breath, nausea, abdominal pain. Very drowsy this morning.    -Data reviewed today: I reviewed all new labs and imaging results over the last 24 hours. I personally reviewed no images or EKG's today.    Physical Exam   Temp: 98.7  F (37.1  C) Temp src: Axillary BP: 138/77 Pulse: 66 Heart Rate: 68 Resp: 26 SpO2: 97 % O2 Device: Nasal cannula Oxygen Delivery: 1 LPM  Vitals:    08/03/19 0618 08/04/19 0810 08/05/19 0400   Weight: 82.9 kg (182 lb 12.2 oz) 83.8 kg (184 lb 11.9 oz) 83.2 kg (183 lb 6.8 oz)     Vital Signs with Ranges  Temp:  [98.3  F (36.8  C)-99.7  F (37.6  C)] 98.7  F (37.1  C)  Pulse:  [56-80] 66  Heart Rate:  [56-77] 68  Resp:  [10-38] 26  BP: ()/() 138/77  SpO2:  [76 %-97 %] 97 %  I/O last 3 completed shifts:  In: 1766.5 [P.O.:590; I.V.:1176.5]  Out: 925 [Urine:925]    Constitutional: Drowsy, cooperative, no apparent distress, laying in bed, tremulous  Respiratory: Clear to auscultation bilaterally, no crackles or wheezing  Cardiovascular: Regular rate and rhythm,  normal S1 and S2, and no murmur noted, 2+ edema  GI: Normal bowel sounds, soft, distended, nontender, no rebound tenderness or guarding  Skin/Integument: No rashes, no cyanosis  Neurologic: Oriented to person, knew he is in the hospital, did not know the year    Medications     - MEDICATION INSTRUCTIONS -       sodium chloride 75 mL/hr at 08/05/19 0441       atorvastatin  10 mg Oral At Bedtime     cefTRIAXone  2 g Intravenous Q24H     DULoxetine  60 mg Oral Daily     fluticasone-vilanterol  1 puff Inhalation Daily     folic acid  1 mg Oral Daily     mirtazapine  30 mg Oral At Bedtime     multivitamin w/minerals  1 tablet Oral Daily     nadolol  40 mg Oral Daily     pantoprazole  40 mg Oral QAM AC     polyethylene glycol  4,000 mL Oral or NG Tube Once     sodium chloride (PF)  3 mL Intracatheter Q8H     tamsulosin  0.4 mg Oral Daily     vitamin B1  100 mg Oral Daily     Data   Recent Labs   Lab 08/05/19  0615 08/04/19  0526 08/03/19  0546  08/01/19  1629   WBC 6.0 5.3 3.5*   < > 3.8*   HGB 11.0* 9.8* 10.1*   < > 8.7*   MCV 91 90 91   < > 95    117* 120*   < > 155   INR  --   --   --   --  1.18*    132* 134   < > 132*   POTASSIUM 3.5 4.1 3.8   < > 3.8   CHLORIDE 100 98 99   < > 95   CO2 28 29 28   < > 27   BUN 14 18 18   < > 23   CR 0.86 1.08 1.03   < > 0.93   ANIONGAP 6 5 7   < > 10   KEV 8.0* 8.2* 7.8*   < > 7.9*   GLC 92 107* 146*   < > 75   ALBUMIN 2.0*  --  2.1*   < > 2.3*   PROTTOTAL 5.7*  --  5.7*   < > 6.5*   BILITOTAL 4.6*  --  4.3*   < > 8.0*   ALKPHOS 276*  --  269*   < > 305*   ALT 46  --  42   < > 53   AST 99*  --  108*   < > 183*    < > = values in this interval not displayed.     No results found for this or any previous visit (from the past 24 hour(s)).

## 2019-08-05 NOTE — PLAN OF CARE
Disoriented to time and situation, illogical actions/hallucinates at times. LS diminished. Tele NSR. Ativan for CIWA scoring. Abdomen distended and round but soft. Bowels active, passing flatus, no BM. Leger w/ adequate UOP. Turned q2h, coccyx red but blanchable. Up w/ 2. Tolerating clears but intermittently nauseous with withdrawal symptoms BLE edematous, pulses palpable

## 2019-08-05 NOTE — PLAN OF CARE
Shift 5209-7134: IMC status. Orientation fluctuates--most consistently disoriented to time, situation; confused; sedated. CIWA: 8's, high of 11--PRN PO Ativan 6 mg given. Lungs: diminished throughout, 1 L O2 NC. BS hypo, passing flatus BM x 1--loose/watery/green. Leger in place: 300 ml output, tea-colored/odorous. Diet: clear liquid. Up w/ 2 assist when repositioning. IV: NS @ 75 ml/hr. Mepilex to coccyx. Ammonia lvl: 75, which is up from 47 last evening--this was pg to hospitalist, see note, but did not receive a response, placed a re-check @2330, and re-check in AM.

## 2019-08-05 NOTE — PROVIDER NOTIFICATION
Dr. Hughes notified by text urine output 100cc for last 8 hours. Orders received. IVF's increased to 125cc/hr.

## 2019-08-05 NOTE — PLAN OF CARE
Opens eyes to calling his name. Slept most of morning. Thought he was at home. Speech illogical most of time. Does follow command most of time. Awake and upright to take pills but fell back asleep. Ciwa scoring 6. Took sips of fluid and jello. Abdominal dressing c/d/I. Abdomen distended tender and BS+. Oxygen on at 1L/n.c. Leger patent. Tele shows sinus rhythm. Sitter at bedside.

## 2019-08-05 NOTE — PROGRESS NOTES
[facility]    Hospitalist Progress Note  Interval History   Pt has worsening mental status over the weekend which he is currently somnolent when seen. H/H has been stable w/ no sign of bleeding. CT AP w/ con was done given palpable abdominal mass confirmed to be hepatomegaly.     ROS was not able to be performed due to mental status.    Assessment & Plan   Jim Richard is a 64 year old male who was admitted on 8/1/2019. GI service consulted for melena. S/p EGD w/ LA-C esophagitis and no esophageal varices and no gastric varices seen (contrary to CT scan finding)    Problem: Melena:  Resolved, likely from LA-C esophagitis   -  C/w daily PO PPI    -  C/w ceftriaxone for total of 7 days course     Problem: Change in mental status:  Pt currently somnolent, minimally follow command  -  Ammonia level fluctuating and low, but given pt is somnolent now, was not able to do numeric connection test hence hard to tell whether it is from hepatic encephalopathy vs Etoh withdrawal vs iatrogenic (ativan, haldo, trazodone)    -  Will recommend empirically to give rifaximin w/ lactulose if pt able to take liquid to titrate 3 soft BM per day as a trial  - consider taper down ativan if CIWA allows, can hold trazodone or other medication that potentially decreases pt's mental status    Problem: Anemia:  Originally plan for colonoscopy but will postpone due to pt's mental status  -  Continue to hold colonoscopy      Problem: Ascites: ongoing s/p paracentesis and was negative for SBP  - consider to start diuretics when pt is more stable    Problem: Etoh hepatitis: discrimination function < 32  - no need for steroid  -continue to monitor daily LFT with INR      Code Status: DNR/DNI    -Data reviewed today: I reviewed all new labs and imaging results over the last 24 hours.     Physical Exam   Temp: 97.8  F (36.6  C) Temp src: Oral BP: 122/73 Pulse: 59 Heart Rate: 57 Resp: 16 SpO2: 93 % O2 Device: Nasal cannula Oxygen  Delivery: 1 LPM  Vitals:    08/03/19 0618 08/04/19 0810 08/05/19 0400   Weight: 82.9 kg (182 lb 12.2 oz) 83.8 kg (184 lb 11.9 oz) 83.2 kg (183 lb 6.8 oz)     Vital Signs with Ranges  Temp:  [97.8  F (36.6  C)-99.7  F (37.6  C)] 97.8  F (36.6  C)  Pulse:  [56-69] 59  Heart Rate:  [56-75] 57  Resp:  [10-38] 16  BP: ()/() 122/73  SpO2:  [76 %-100 %] 93 %  I/O last 3 completed shifts:  In: 2141.5 [P.O.:415; I.V.:1726.5]  Out: 625 [Urine:625]    Constitutional: Somnolent, grossly jaundice, cooperative, no apparent distress  Respiratory: Clear to auscultation bilaterally, no crackles or wheezing  Cardiovascular: Regular rate and rhythm, normal S1 and S2, and no murmur noted  GI: Normal bowel sounds, soft, distended w/ fluid wave, non-tender, palpable hepatosplenomegaly  Skin/Integumen: No rashes, no cyanosis, no edema  Other:     Pavan Arguello MD  ([facility]  Office: [phone]  Cell: [phone]    Medications     - MEDICATION INSTRUCTIONS -       sodium chloride 125 mL/hr at 08/05/19 1539       atorvastatin  10 mg Oral At Bedtime     cefTRIAXone  2 g Intravenous Q24H     DULoxetine  60 mg Oral Daily     fluticasone-vilanterol  1 puff Inhalation Daily     folic acid  1 mg Oral Daily     mirtazapine  30 mg Oral At Bedtime     multivitamin w/minerals  1 tablet Oral Daily     nadolol  40 mg Oral Daily     pantoprazole  40 mg Oral QAM AC     polyethylene glycol  4,000 mL Oral or NG Tube Once     sodium chloride (PF)  3 mL Intracatheter Q8H     tamsulosin  0.4 mg Oral Daily       Data   Recent Labs   Lab 08/05/19  0615 08/04/19  0526 08/03/19  0546  08/01/19  1629   WBC 6.0 5.3 3.5*   < > 3.8*   HGB 11.0* 9.8* 10.1*   < > 8.7*   MCV 91 90 91   < > 95    117* 120*   < > 155   INR  --   --   --   --  1.18*    132* 134   < > 132*   POTASSIUM 3.5 4.1 3.8   < > 3.8   CHLORIDE 100 98 99   < > 95   CO2 28 29 28   < > 27   BUN 14 18 18   < > 23   CR 0.86 1.08 1.03   <  > 0.93   ANIONGAP 6 5 7   < > 10   KEV 8.0* 8.2* 7.8*   < > 7.9*   GLC 92 107* 146*   < > 75   ALBUMIN 2.0*  --  2.1*   < > 2.3*   PROTTOTAL 5.7*  --  5.7*   < > 6.5*   BILITOTAL 4.6*  --  4.3*   < > 8.0*   ALKPHOS 276*  --  269*   < > 305*   ALT 46  --  42   < > 53   AST 99*  --  108*   < > 183*    < > = values in this interval not displayed.       No results found for this or any previous visit (from the past 24 hour(s)).     Pavan Arguello MD  ([facility]  Office: [phone]  Cell: [phone]

## 2019-08-06 ENCOUNTER — APPOINTMENT (OUTPATIENT)
Dept: PHYSICAL THERAPY | Facility: CLINIC | Age: 64
DRG: 369 | End: 2019-08-06
Payer: MEDICARE

## 2019-08-06 ENCOUNTER — APPOINTMENT (OUTPATIENT)
Dept: OCCUPATIONAL THERAPY | Facility: CLINIC | Age: 64
DRG: 369 | End: 2019-08-06
Attending: HOSPITALIST
Payer: MEDICARE

## 2019-08-06 LAB
ALBUMIN SERPL-MCNC: 1.9 G/DL (ref 3.4–5)
ALP SERPL-CCNC: 290 U/L (ref 40–150)
ALT SERPL W P-5'-P-CCNC: 39 U/L (ref 0–70)
ANION GAP SERPL CALCULATED.3IONS-SCNC: 7 MMOL/L (ref 3–14)
AST SERPL W P-5'-P-CCNC: 86 U/L (ref 0–45)
BILIRUB SERPL-MCNC: 4.3 MG/DL (ref 0.2–1.3)
BUN SERPL-MCNC: 15 MG/DL (ref 7–30)
CALCIUM SERPL-MCNC: 7.8 MG/DL (ref 8.5–10.1)
CHLORIDE SERPL-SCNC: 104 MMOL/L (ref 94–109)
CO2 SERPL-SCNC: 25 MMOL/L (ref 20–32)
CREAT SERPL-MCNC: 0.88 MG/DL (ref 0.66–1.25)
ERYTHROCYTE [DISTWIDTH] IN BLOOD BY AUTOMATED COUNT: 18.1 % (ref 10–15)
GFR SERPL CREATININE-BSD FRML MDRD: 90 ML/MIN/{1.73_M2}
GLUCOSE SERPL-MCNC: 89 MG/DL (ref 70–99)
HCT VFR BLD AUTO: 32.2 % (ref 40–53)
HGB BLD-MCNC: 10.8 G/DL (ref 13.3–17.7)
INR PPP: 1.31 (ref 0.86–1.14)
INTERPRETATION ECG - MUSE: NORMAL
MCH RBC QN AUTO: 30.5 PG (ref 26.5–33)
MCHC RBC AUTO-ENTMCNC: 33.5 G/DL (ref 31.5–36.5)
MCV RBC AUTO: 91 FL (ref 78–100)
PLATELET # BLD AUTO: 191 10E9/L (ref 150–450)
POTASSIUM SERPL-SCNC: 3.5 MMOL/L (ref 3.4–5.3)
PROT SERPL-MCNC: 5.5 G/DL (ref 6.8–8.8)
RBC # BLD AUTO: 3.54 10E12/L (ref 4.4–5.9)
SODIUM SERPL-SCNC: 136 MMOL/L (ref 133–144)
WBC # BLD AUTO: 5.2 10E9/L (ref 4–11)

## 2019-08-06 PROCEDURE — 85027 COMPLETE CBC AUTOMATED: CPT | Performed by: HOSPITALIST

## 2019-08-06 PROCEDURE — 25000132 ZZH RX MED GY IP 250 OP 250 PS 637: Mod: GY | Performed by: INTERNAL MEDICINE

## 2019-08-06 PROCEDURE — 25000128 H RX IP 250 OP 636: Performed by: INTERNAL MEDICINE

## 2019-08-06 PROCEDURE — A9270 NON-COVERED ITEM OR SERVICE: HCPCS | Mod: GY | Performed by: INTERNAL MEDICINE

## 2019-08-06 PROCEDURE — 25000132 ZZH RX MED GY IP 250 OP 250 PS 637: Mod: GY | Performed by: HOSPITALIST

## 2019-08-06 PROCEDURE — 80053 COMPREHEN METABOLIC PANEL: CPT | Performed by: HOSPITALIST

## 2019-08-06 PROCEDURE — 85610 PROTHROMBIN TIME: CPT | Performed by: HOSPITALIST

## 2019-08-06 PROCEDURE — 97162 PT EVAL MOD COMPLEX 30 MIN: CPT | Mod: GP | Performed by: PHYSICAL THERAPIST

## 2019-08-06 PROCEDURE — 12000000 ZZH R&B MED SURG/OB

## 2019-08-06 PROCEDURE — 97530 THERAPEUTIC ACTIVITIES: CPT | Mod: GO

## 2019-08-06 PROCEDURE — 99232 SBSQ HOSP IP/OBS MODERATE 35: CPT | Performed by: HOSPITALIST

## 2019-08-06 PROCEDURE — 25800030 ZZH RX IP 258 OP 636: Performed by: HOSPITALIST

## 2019-08-06 PROCEDURE — 36415 COLL VENOUS BLD VENIPUNCTURE: CPT | Performed by: HOSPITALIST

## 2019-08-06 PROCEDURE — 97530 THERAPEUTIC ACTIVITIES: CPT | Mod: GP | Performed by: PHYSICAL THERAPIST

## 2019-08-06 PROCEDURE — 97167 OT EVAL HIGH COMPLEX 60 MIN: CPT | Mod: GO

## 2019-08-06 RX ORDER — LACTULOSE 10 G/15ML
10 SOLUTION ORAL 2 TIMES DAILY
Status: DISCONTINUED | OUTPATIENT
Start: 2019-08-06 | End: 2019-08-10

## 2019-08-06 RX ADMIN — LORAZEPAM 1 MG: 0.5 TABLET ORAL at 21:37

## 2019-08-06 RX ADMIN — ALBUTEROL SULFATE 2 PUFF: 90 AEROSOL, METERED RESPIRATORY (INHALATION) at 09:16

## 2019-08-06 RX ADMIN — DULOXETINE HYDROCHLORIDE 60 MG: 60 CAPSULE, DELAYED RELEASE ORAL at 09:04

## 2019-08-06 RX ADMIN — SODIUM CHLORIDE: 9 INJECTION, SOLUTION INTRAVENOUS at 00:48

## 2019-08-06 RX ADMIN — ATORVASTATIN CALCIUM 10 MG: 10 TABLET, FILM COATED ORAL at 21:04

## 2019-08-06 RX ADMIN — FOLIC ACID 1 MG: 1 TABLET ORAL at 09:04

## 2019-08-06 RX ADMIN — MULTIPLE VITAMINS W/ MINERALS TAB 1 TABLET: TAB at 09:04

## 2019-08-06 RX ADMIN — PANTOPRAZOLE SODIUM 40 MG: 40 TABLET, DELAYED RELEASE ORAL at 09:04

## 2019-08-06 RX ADMIN — HYDROXYZINE HYDROCHLORIDE 25 MG: 25 TABLET ORAL at 23:12

## 2019-08-06 RX ADMIN — SODIUM CHLORIDE: 9 INJECTION, SOLUTION INTRAVENOUS at 21:04

## 2019-08-06 RX ADMIN — TAMSULOSIN HYDROCHLORIDE 0.4 MG: 0.4 CAPSULE ORAL at 09:05

## 2019-08-06 RX ADMIN — CEFTRIAXONE SODIUM 2 G: 2 INJECTION, POWDER, FOR SOLUTION INTRAMUSCULAR; INTRAVENOUS at 18:21

## 2019-08-06 RX ADMIN — SODIUM CHLORIDE: 9 INJECTION, SOLUTION INTRAVENOUS at 09:03

## 2019-08-06 RX ADMIN — FLUTICASONE FUROATE AND VILANTEROL TRIFENATATE 1 PUFF: 200; 25 POWDER RESPIRATORY (INHALATION) at 09:05

## 2019-08-06 RX ADMIN — MIRTAZAPINE 30 MG: 30 TABLET, FILM COATED ORAL at 21:04

## 2019-08-06 RX ADMIN — NADOLOL 40 MG: 40 TABLET ORAL at 09:05

## 2019-08-06 ASSESSMENT — ACTIVITIES OF DAILY LIVING (ADL)
ADLS_ACUITY_SCORE: 15
PREVIOUS_RESPONSIBILITIES: SHOPPING;MEDICATION MANAGEMENT;FINANCES;DRIVING
ADLS_ACUITY_SCORE: 15

## 2019-08-06 NOTE — PLAN OF CARE
VSS, Lethargic, Disoriented x2, (he thought it was 1920, knew the present was Trump, knew he was at [facility]). Lung sounds diminished with expiratory wheezes. Bowel sounds hyperactive, abdomen is distended and firm. Leger catheter- dark tea colored urine. Turn and repo every 2 hours and PRN. Denies pain. Poor appetite- clear liquid diet. Bilateral LE edema. Sitter at bedside.

## 2019-08-06 NOTE — PROGRESS NOTES
"   08/06/19 0808   Quick Adds   Type of Visit Initial PT Evaluation   Living Environment   Lives With significant other   [place]   Home Accessibility   (difficulty stating secondary to confusion)   [place]   Self-Care   Usual Activity Tolerance moderate   Current Activity Tolerance poor   Equipment Currently Used at Home none   Activity/Exercise/Self-Care Comment reports need for walker secondary to multiple recent falls   Functional Level Prior   Ambulation 0-->independent   Transferring 0-->independent   Toileting 0-->independent   Bathing 0-->independent   Communication 0-->understands/communicates without difficulty   Swallowing 0-->swallows foods/liquids without difficulty   Cognition 0 - no cognition issues reported   Fall history within last six months yes   Number of times patient has fallen within last six months 6   Which of the above functional risks had a recent onset or change? ambulation;transferring;toileting;bathing;dressing;cognition;fall history   Prior Functional Level Comment patient reports no use of an assistive device but multiple recent falls   General Information   Onset of Illness/Injury or Date of Surgery - Date 08/01/19   Referring Physician Arnaldo Hughes MD   Patient/Family Goals Statement not stated   Pertinent History of Current Problem (include personal factors and/or comorbidities that impact the POC) per chart: \"Jim Richard is a 64 year old male who was admitted on 8/1/2019. GI service consulted for melena. S/p EGD w/ LA-C esophagitis and no esophageal varices and no gastric varices seen (contrary to CT scan finding)\"; now having issues with AMS and ETOH hepatitis; see medical record for further information   Precautions/Limitations fall precautions;oxygen therapy device and L/min  (2L)   General Observations patient in bed; sitter present   General Info Comments " Activity: bedrest with BRP   Cognitive Status Examination   Orientation person;place  (thought it was July 1999)   Level of Consciousness alert;confused   Follows Commands and Answers Questions 50% of the time;75% of the time   Personal Safety and Judgment impaired;at risk behaviors demonstrated   Memory impaired   Cognitive Comment decreased command following   Pain Assessment   Patient Currently in Pain Yes, see Vital Sign flowsheet  (behind R knee)   Posture    Posture Comments forward head and shoulders in sitting/standing   Range of Motion (ROM)   ROM Comment decreased AROM in LE's; R LE limited by some R knee pain   Strength   Strength Comments significant functional weakness; difficulty moving extremities against gravity, LEs worse than UE's; only able to stand briefly   Bed Mobility   Bed Mobility Comments min/mod A for supine to sit with HOB elevated; extra time   Transfer Skills   Transfer Comments sit>stand with mod A x 2 and use of walker   Gait   Gait Comments difficulty taking steps at EOB with walker use and mod A x 2   Balance   Balance Comments impaired; current need for walker and assist   Sensory Examination   Sensory Perception Comments decreased in feet per patient report   General Therapy Interventions   Planned Therapy Interventions bed mobility training;gait training;strengthening;transfer training;progressive activity/exercise   Clinical Impression   Criteria for Skilled Therapeutic Intervention yes, treatment indicated   PT Diagnosis impaired gait/transfers   Influenced by the following impairments weakkness, impaired cognition, decreased activity tolerance; impaired balance   Functional limitations due to impairments impaired independence with functional mobility   Clinical Presentation Evolving/Changing   Clinical Presentation Rationale assist of 2, impaired cognition; medical status   Clinical Decision Making (Complexity) Moderate complexity   Therapy Frequency 5x/week   Predicted  "Duration of Therapy Intervention (days/wks) 5 days   Anticipated Equipment Needs at Discharge front wheeled walker   Anticipated Discharge Disposition Transitional Care Facility   Risk & Benefits of therapy have been explained Yes   Patient, Family & other staff in agreement with plan of care Yes   [facility] TM \"6 Clicks\"   2016, Trustees of [facility], under license to [employer].  All rights reserved.   6 Clicks Short Forms Basic Mobility Inpatient Short Form   [facility]  \"6 Clicks\" V.2 Basic Mobility Inpatient Short Form   1. Turning from your back to your side while in a flat bed without using bedrails? 3 - A Little   2. Moving from lying on your back to sitting on the side of a flat bed without using bedrails? 2 - A Lot   3. Moving to and from a bed to a chair (including a wheelchair)? 2 - A Lot   4. Standing up from a chair using your arms (e.g., wheelchair, or bedside chair)? 2 - A Lot   5. To walk in hospital room? 2 - A Lot   6. Climbing 3-5 steps with a railing? 1 - Total   Basic Mobility Raw Score (Score out of 24.Lower scores equate to lower levels of function) 12   Total Evaluation Time   Total Evaluation Time (Minutes) 15     "

## 2019-08-06 NOTE — PROGRESS NOTES
"   19 1000   Quick Adds   Type of Visit Initial Occupational Therapy Evaluation   Living Environment   Lives With parent(s)   Living Arrangements house   Living Environment Comment reports living in house with mother and not in townhome with wife. However, states that his mother is . Was not able to obtain clear living situation from pt d/t confusion.    Functional Level   Ambulation 0-->independent   Transferring 0-->independent   Toileting 0-->independent   Bathing 0-->independent   Dressing 0-->independent   Eating 0-->independent   Fall history within last six months yes   Number of times patient has fallen within last six months   (when asked pt only stated \"a lot\")   Prior Functional Level Comment pt reports prior to admission being IND with ADLs, driving, shopping, and medication management    General Information   Onset of Illness/Injury or Date of Surgery - Date 19   Referring Physician MD Ellen   Patient/Family Goals Statement not stated    Additional Occupational Profile Info/Pertinent History of Current Problem 65 yo with hx of ETOH, ETOH liver disease, anxiety, GI bleed. Pt  presented with loose stool and melena to the hosptial with GI bleed. During admission pt has had sitter put in place, and has increasing reports of confusion with ability to minimally follow commands. Pt was diagnosed with melena with upper GI bleed, fluid in abodmen, alchohol w/d, and elevated LFTS.    Precautions/Limitations fall precautions;other (see comments)   Cognitive Status Examination   Orientation person   Level of Consciousness lethargic/somnolent   Follows Commands (Cognition) increased processing time needed;0-24% accuracy   Memory impaired   Cognitive Comment pt was able to remember 0/5 words after short delay. Minimally able to follow one step commands. Pt was lethargic and required increased processing time.    Visual Perception   Visual Perception No deficits were identified   Sensory " Examination   Sensory Comments Pt was able to identify 2/2 light touch on forearm of RUE. Pt was able to identify 2/2 light touch on forearm of LUE.  Pt was able to identify 1/1 light touch on hamstring of RLE. Pt was able to identify 1/2 light touch on LLE. Pt had difficulty identifying when LLE on back of hamstrings was touched. Pt reported numbness and tingling in BUE's and BLE's.   Pain Assessment   Patient Currently in Pain Yes, see Vital Sign flowsheet   Range of Motion (ROM)   ROM Comment Pt had  R shoulder flexion: ~160 degrees , R elbow flexion: no deficits, R elbow extension: ~5 degrees, R wrist flexion/extension: no deficits. L shoulder flexion ~ 150 degrees, R elbow flexion: no deficits, L elbow extension: no deficits, L elbow wrist extenison/ flexion: no deficits. No deficits were seen in R/L wrist ROM, B hands WNL.   Strength   Strength Comments R shoulder: 3/5 due to reported pain.  R elbow flexion and wrist flexion/ extension : 5/5; LUE : 5/5 for shoulder flexion, elbow flexion, and wrist extension/ flexion   Mobility   Bed Mobility Bed mobility skill: Rolling/Turning;Bed mobility skill: Supine to sit   Bed Mobility Skill: Rolling/Turning   Level of [place] - Bed Mobility Skill Rolling Turning stand-by assist   Physical Assist/Nonphysical Assist supervision   Bed Mobility Skill: Supine to Sit   Level of [place]: Supine/Sit stand-by assist   Physical Assist/Nonphysical Assist: Supine/Sit verbal cues;other (see comments)  (increased time to complete and respond to vc)   Balance   Balance Comments SB A static sitting balance. While doffing socks pt required Min a to maintain upright. MOD A of 2 to scoot to HOB.    Lower Body Dressing   Level of [place]: Dress Lower Body dependent (less than 25% patients effort)   Physical Assist/Nonphysical Assist: Dress Lower Body 1 person assist   Grooming   Level of [place]: Grooming stand-by assist  (while sitting coming hair)   Physical  "Assist/Nonphysical Assist: Grooming set-up required   Instrumental Activities of Daily Living (IADL)   Previous Responsibilities shopping;medication management;finances;driving   Activities of Daily Living Analysis   Impairments Contributing to Impaired Activities of Daily Living balance impaired;cognition impaired;strength decreased  (decreased activity tolerance)   General Therapy Interventions   Planned Therapy Interventions ADL retraining;cognition;strengthening;transfer training   Clinical Impression   Criteria for Skilled Therapeutic Interventions Met yes, treatment indicated   OT Diagnosis decrease IND in ADLs   Influenced by the following impairments decreased cognition, activity tolerance, balance, strength   Assessment of Occupational Performance 5 or more Performance Deficits   Identified Performance Deficits dressing, bathing, grooming, transfers, toileting   Clinical Decision Making (Complexity) High complexity   Therapy Frequency 5x/week   Predicted Duration of Therapy Intervention (days/wks) 3   Anticipated Equipment Needs at Discharge bathing equipment;dressing equipment;toileting equipment   Anticipated Discharge Disposition Transitional Care Facility   Risks and Benefits of Treatment have been explained. Yes   Patient, Family & other staff in agreement with plan of care Yes   [facility] AM-PAC  \"6 Clicks\" Daily Activity Inpatient Short Form   1. Putting on and taking off regular lower body clothing? 1 - Total   2. Bathing (including washing, rinsing, drying)? 1 - Total   3. Toileting, which includes using toilet, bedpan or urinal? 1 - Total   4. Putting on and taking off regular upper body clothing? 2 - A Lot   5. Taking care of personal grooming such as brushing teeth? 2 - A Lot   6. Eating meals? 4 - None   Daily Activity Raw Score (Score out of 24.Lower scores equate to lower levels of function) 11   Total Evaluation Time   Total Evaluation Time (Minutes) 15     "

## 2019-08-06 NOTE — PROGRESS NOTES
[facility]  Gastroenterology Progress Note     Jim Richard MRN# [id number]   YOB: 1955 Age: 64 year old          Assessment and Plan:     GI bleed- hemoglobin stable. No recurrent melena. S/p EGD with esophagitis and no esophageal varices. Continue with daily oral PPI. Consider colonoscopy when patient stable or as an outpatient.    Change in mental status- Less somnolent and follows some command. Still shows signs of confusion. Ammonia level has been in normal range. Having bowel movements. Had 2 yesterday.     Alcoholic hepatitis/ Ascites- s/p paracentesis. Negative for SBP. Creatinine 0.88 INR 1.31         Gastrointestinal hemorrhage, unspecified gastrointestinal hemorrhage type      Interval History:   no new complaints, denies chest pain, denies shortness of breath and has had a bowel movement in the last 24 hours              Review of Systems:   C: NEGATIVE for fever, chills, change in weight  E/M: NEGATIVE for ear, mouth and throat problems  R: NEGATIVE for significant cough or SOB  CV: NEGATIVE for chest pain, palpitations or peripheral edema             Medications:   I have reviewed this patient's current medications    atorvastatin  10 mg Oral At Bedtime     cefTRIAXone  2 g Intravenous Q24H     DULoxetine  60 mg Oral Daily     fluticasone-vilanterol  1 puff Inhalation Daily     folic acid  1 mg Oral Daily     mirtazapine  30 mg Oral At Bedtime     multivitamin w/minerals  1 tablet Oral Daily     nadolol  40 mg Oral Daily     pantoprazole  40 mg Oral QAM AC     polyethylene glycol  4,000 mL Oral or NG Tube Once     sodium chloride (PF)  3 mL Intracatheter Q8H     tamsulosin  0.4 mg Oral Daily                  Physical Exam:   Vitals were reviewed  Vital Signs with Ranges  Temp:  [97.8  F (36.6  C)-100.1  F (37.8  C)] 98.8  F (37.1  C)  Pulse:  [59-67] 63  Heart Rate:  [57-75] 71  Resp:  [14-23] 18  BP: (105-137)/(63-82) 122/74  SpO2:  [86 %-100 %] 100 %  I/O last 3  completed shifts:  In: 1671 [P.O.:280; I.V.:1391]  Out: 575 [Urine:575]  Constitutional: alert and no distress, confused  Cardiovascular: negative, PMI normal. No lifts, heaves, or thrills. RRR. No murmurs, clicks gallops or rub  Respiratory: negative, Percussion normal. Good diaphragmatic excursion. Lungs clear  Head: Normocephalic. No masses, lesions, tenderness or abnormalities  Neck: Neck supple. No adenopathy. Thyroid symmetric, normal size,, Carotids without bruits.  Abdomen: Abdomen soft, mildly tender. Ascites BS normal. No masses, organomegaly           Data:   I reviewed the patient's new clinical lab test results.   Recent Labs   Lab Test 08/06/19  0739 08/05/19  0615 08/04/19  0526  08/01/19  1629  09/29/18  0550   WBC 5.2 6.0 5.3   < > 3.8*   < > 6.1   HGB 10.8* 11.0* 9.8*   < > 8.7*   < > 10.1*   MCV 91 91 90   < > 95   < > 74*    171 117*   < > 155   < > 207   INR 1.31*  --   --   --  1.18*  --  1.10    < > = values in this interval not displayed.     Recent Labs   Lab Test 08/06/19  0739 08/05/19  0615 08/04/19  0526   POTASSIUM 3.5 3.5 4.1   CHLORIDE 104 100 98   CO2 25 28 29   BUN 15 14 18   ANIONGAP 7 6 5     Recent Labs   Lab Test 08/06/19  0739 08/05/19  0615 08/03/19  0546  02/13/19  2340  09/29/18  0550  03/21/18  1555  09/25/14  0510  06/12/12  0550   ALBUMIN 1.9* 2.0* 2.1*   < > 3.1*   < > 3.2*   < > 2.9*   < >  --    < >  --    BILITOTAL 4.3* 4.6* 4.3*   < > 0.4   < > 1.0   < > 0.9   < >  --    < >  --    ALT 39 46 42   < > 41   < > 60   < > 74*   < >  --    < >  --    AST 86* 99* 108*   < > 89*   < > 140*   < > 136*   < >  --    < >  --    PROTEIN  --   --   --   --   --   --   --   --   --   --  Negative  --  Negative   LIPASE  --   --   --   --  536*  --  585*  --  316  --   --   --   --     < > = values in this interval not displayed.       I reviewed the patient's new imaging results.    All laboratory data reviewed  All imaging studies reviewed by me.    Gloria De Leon PA-C,   8/6/2019  [facility]  Office : [phone]  Cell: [phone] (Dr. Shaw)  Cell: [phone] (Gloria De Leon PA-C)

## 2019-08-06 NOTE — PLAN OF CARE
Discharge Planner OT   Patient plan for discharge: Not discussed  Current status: Orders received, eval completed, treatment initiated. Pt reports being previously IND with ADLs and IADLs.     Pt was able to move from supine to sit with SBA and increased time to follow through with VC. Pt was able to complete combing hair while sitting with Set Up A. Pt required a non verbal cue of handing pt comb to complete instead of VC's. Pt was Dep A overall for LB sock donning/doffing. In particular pt required Min A back support due to struggling with keeping an upright posture and decreased sitting balance while sock doffing. Pt also required non verbal cue of tapping foot to complete sock doffing. Pt was unable to jarocho sock due to decreased activity tolerance.  Pt minimally followed  cues and required extra time for processing commands. During eval it was hard to obtain accurate history due to confusion. Cognition is impaired.   Barriers to return to prior living situation: Level of A for ADLs, falls risk, cognition,   Recommendations for discharge: TCU  Rationale for recommendations:   Patient is currently below IND baseline. Patient would benefit from ongoing OT to increase activity tolerance, cognition, strength, and IND with ADLs.        Entered by: Kolby Powell 08/06/2019 10:52 AM

## 2019-08-06 NOTE — PLAN OF CARE
He continues to have periods of alertness and lethargy/ Denies pain.CIWA scored 6/3/5 this shift. UOP picked up since IV fluids were increased; denies pain.

## 2019-08-06 NOTE — PROGRESS NOTES
[facility]    Hospitalist Progress Note    Assessment & Plan   Jim Richard is a 64 year old male with history of alcohol dependence, alcoholic liver disease, depression and anxiety, history of GI bleed secondary to esophageal varices presented to [facility] emergency room with dark-colored stools ongoing for the last 1 week associated with some loose bowel movements.    Melena  Suspected upper GI bleed  Alcoholic liver disease  Esophageal varices   Acute blood loss anemia secondary to GI bleed  - GI consulted, appreciate their assistance.  - EGD showed esophagitis, no significant esophageal varices.  - GI planning colonoscopy when able to tolerate prep.  - Continue oral protonix.  - Hemoglobin stable at 10.8 this morning.   - Received two units PRBCs on 8/1/19.  - Continue rocephin for SBP prophylaxis (day #6/7).  - Nadolol added on 8/4/19 for portal hypertension and varices.  - Ammonia mildly elevated yesterday, trending down this morning. ? element of hepatic encephalopathy, appreciate GI input.   - Started on rifaximin and lactulose by GI.  - Monitor hemoglobin.  - CT abdomen/pelvis obtained 8/3/19 due to firm abdominal mass palpated on exam, was likely hepatomegaly.  - Continue IV fluids until reliably tolerating oral intake.     Alcohol dependence and alcohol withdrawal  - Patient complains of being shaky since stopped drinking 1 week ago.  - He was placed on CIWA protocol with Ativan as needed dosing per CIWA protocol.  - Having significant withdrawal symptoms over the past 48 hours and has required large doses of lorazepam. CIWA scores range from 4-7 over the past 24 hours, has not received PRN lorazepam since 525 yesterday morning.  - Has a one-to-one sitter.  - Continue thiamine, folic acid, and multivitamin.  - Psychiatry consulted.    Depression/anxiety  - Continue PTA duloxetine and remeron.     Ascites  - S/p ultrasound guided paracentesis on 8/2/19 with removal of 3.2  liters of fluid.  - Cell count not suggestive of SBP. Culture pending, negative to date.  - Already on empiric rocephin (day #6/7) for SBP prophylaxis as noted above.  - Consider adding diuretics when more stable and tolerating oral intake.     Elevated LFTs  - Due to alcoholic liver disease.  - LFT's slowly trending down.    Itchy eyes  - Artificial tears help only mildly.  - ? Allergic conjunctivitis, continue PRN antihistamine eye drop.    Hypokalemia  Hypomagnesemia  Hypophosphatemia  - Replace per protocol.  - Within normal limits this morning.    DVT Prophylaxis: Pneumatic Compression Devices  Code Status: DNR/DNI  Expected discharge: 2 - 3 days, recommended to prior living arrangement once alcohol withdrawal symptoms resolved.    Arnaldo Hughes MD  Text Page  (7am - 6pm, M-F)    Interval History   Jim Richard complains of some abdominal discomfort this morning. Vomited yesterday morning. Having multiple bowel movements. Denies fevers, chest pain, shortness of breath. More alert today, but not back to baseline.    -Data reviewed today: I reviewed all new labs and imaging results over the last 24 hours. I personally reviewed no images or EKG's today.    Physical Exam   Temp: 98.6  F (37  C) Temp src: Oral BP: 131/78 Pulse: 66 Heart Rate: 71 Resp: 18 SpO2: 93 % O2 Device: None (Room air) Oxygen Delivery: 2 LPM  Vitals:    08/04/19 0810 08/05/19 0400 08/06/19 0903   Weight: 83.8 kg (184 lb 11.9 oz) 83.2 kg (183 lb 6.8 oz) 85.9 kg (189 lb 6 oz)     Vital Signs with Ranges  Temp:  [97.8  F (36.6  C)-100.1  F (37.8  C)] 98.6  F (37  C)  Pulse:  [59-67] 66  Heart Rate:  [57-71] 71  Resp:  [16-23] 18  BP: (105-137)/(63-78) 131/78  SpO2:  [86 %-100 %] 93 %  I/O last 3 completed shifts:  In: 1671 [P.O.:280; I.V.:1391]  Out: 575 [Urine:575]    Constitutional: More alert today, cooperative, no apparent distress, laying in bed  Respiratory: Clear to auscultation bilaterally, no crackles or  wheezing  Cardiovascular: Regular rate and rhythm, normal S1 and S2, and no murmur noted, 2+ edema  GI: Normal bowel sounds, soft, distended, mild generalized tenderness, no rebound tenderness or guarding  Skin/Integument: No rashes, no cyanosis  Neurologic: Oriented to person, knew he is in the hospital and knew the name of the hospital, though the year was 1999, knew that Virgilio Flores is president, tremor improved,     Medications     - MEDICATION INSTRUCTIONS -       sodium chloride 125 mL/hr at 08/06/19 0903       atorvastatin  10 mg Oral At Bedtime     cefTRIAXone  2 g Intravenous Q24H     DULoxetine  60 mg Oral Daily     fluticasone-vilanterol  1 puff Inhalation Daily     folic acid  1 mg Oral Daily     mirtazapine  30 mg Oral At Bedtime     multivitamin w/minerals  1 tablet Oral Daily     nadolol  40 mg Oral Daily     pantoprazole  40 mg Oral QAM AC     polyethylene glycol  4,000 mL Oral or NG Tube Once     sodium chloride (PF)  3 mL Intracatheter Q8H     tamsulosin  0.4 mg Oral Daily     Data   Recent Labs   Lab 08/06/19  0739 08/05/19  0615 08/04/19  0526  08/01/19  1629   WBC 5.2 6.0 5.3   < > 3.8*   HGB 10.8* 11.0* 9.8*   < > 8.7*   MCV 91 91 90   < > 95    171 117*   < > 155   INR 1.31*  --   --   --  1.18*    134 132*   < > 132*   POTASSIUM 3.5 3.5 4.1   < > 3.8   CHLORIDE 104 100 98   < > 95   CO2 25 28 29   < > 27   BUN 15 14 18   < > 23   CR 0.88 0.86 1.08   < > 0.93   ANIONGAP 7 6 5   < > 10   KEV 7.8* 8.0* 8.2*   < > 7.9*   GLC 89 92 107*   < > 75   ALBUMIN 1.9* 2.0*  --    < > 2.3*   PROTTOTAL 5.5* 5.7*  --    < > 6.5*   BILITOTAL 4.3* 4.6*  --    < > 8.0*   ALKPHOS 290* 276*  --    < > 305*   ALT 39 46  --    < > 53   AST 86* 99*  --    < > 183*    < > = values in this interval not displayed.     No results found for this or any previous visit (from the past 24 hour(s)).

## 2019-08-06 NOTE — PROGRESS NOTES
SPIRITUAL HE[facility] SERVICES  Spiritual Assessment Progress Note  [facility]    had a brief visit with pt.  He stated that he was coping ok.  Pt was sleepy and did not have much energy to talk.     offered support.      No needs at this time.    No plans to follow.                                                                                                                                             Evelyne Clark M.Div., [facility]  Staff    Pager [phone]

## 2019-08-07 ENCOUNTER — DOCUMENTATION ONLY (OUTPATIENT)
Dept: OTHER | Facility: CLINIC | Age: 64
End: 2019-08-07

## 2019-08-07 ENCOUNTER — APPOINTMENT (OUTPATIENT)
Dept: OCCUPATIONAL THERAPY | Facility: CLINIC | Age: 64
DRG: 369 | End: 2019-08-07
Payer: MEDICARE

## 2019-08-07 ENCOUNTER — AMBULATORY - HEALTHEAST (OUTPATIENT)
Dept: OTHER | Facility: CLINIC | Age: 64
End: 2019-08-07

## 2019-08-07 LAB
ANION GAP SERPL CALCULATED.3IONS-SCNC: 8 MMOL/L (ref 3–14)
BACTERIA SPEC CULT: NO GROWTH
BUN SERPL-MCNC: 16 MG/DL (ref 7–30)
CALCIUM SERPL-MCNC: 7.7 MG/DL (ref 8.5–10.1)
CHLORIDE SERPL-SCNC: 106 MMOL/L (ref 94–109)
CO2 SERPL-SCNC: 23 MMOL/L (ref 20–32)
CREAT SERPL-MCNC: 0.87 MG/DL (ref 0.66–1.25)
ERYTHROCYTE [DISTWIDTH] IN BLOOD BY AUTOMATED COUNT: 18.2 % (ref 10–15)
GFR SERPL CREATININE-BSD FRML MDRD: >90 ML/MIN/{1.73_M2}
GLUCOSE SERPL-MCNC: 76 MG/DL (ref 70–99)
HCT VFR BLD AUTO: 30.6 % (ref 40–53)
HGB BLD-MCNC: 10.2 G/DL (ref 13.3–17.7)
MAGNESIUM SERPL-MCNC: 1.4 MG/DL (ref 1.6–2.3)
MAGNESIUM SERPL-MCNC: 1.4 MG/DL (ref 1.6–2.3)
MAGNESIUM SERPL-MCNC: 2.3 MG/DL (ref 1.6–2.3)
MCH RBC QN AUTO: 30.5 PG (ref 26.5–33)
MCHC RBC AUTO-ENTMCNC: 33.3 G/DL (ref 31.5–36.5)
MCV RBC AUTO: 92 FL (ref 78–100)
PHOSPHATE SERPL-MCNC: 3.2 MG/DL (ref 2.5–4.5)
PLATELET # BLD AUTO: 184 10E9/L (ref 150–450)
POTASSIUM SERPL-SCNC: 3.7 MMOL/L (ref 3.4–5.3)
RBC # BLD AUTO: 3.34 10E12/L (ref 4.4–5.9)
SODIUM SERPL-SCNC: 137 MMOL/L (ref 133–144)
SPECIMEN SOURCE: NORMAL
WBC # BLD AUTO: 4.8 10E9/L (ref 4–11)

## 2019-08-07 PROCEDURE — 97535 SELF CARE MNGMENT TRAINING: CPT | Mod: GO

## 2019-08-07 PROCEDURE — 25000132 ZZH RX MED GY IP 250 OP 250 PS 637: Mod: GY | Performed by: INTERNAL MEDICINE

## 2019-08-07 PROCEDURE — 84100 ASSAY OF PHOSPHORUS: CPT | Performed by: HOSPITALIST

## 2019-08-07 PROCEDURE — 25000128 H RX IP 250 OP 636: Performed by: HOSPITALIST

## 2019-08-07 PROCEDURE — 12000000 ZZH R&B MED SURG/OB

## 2019-08-07 PROCEDURE — 85027 COMPLETE CBC AUTOMATED: CPT | Performed by: HOSPITALIST

## 2019-08-07 PROCEDURE — 83735 ASSAY OF MAGNESIUM: CPT | Performed by: HOSPITALIST

## 2019-08-07 PROCEDURE — 80048 BASIC METABOLIC PNL TOTAL CA: CPT | Performed by: HOSPITALIST

## 2019-08-07 PROCEDURE — 25000132 ZZH RX MED GY IP 250 OP 250 PS 637: Mod: GY | Performed by: HOSPITALIST

## 2019-08-07 PROCEDURE — 25800030 ZZH RX IP 258 OP 636: Performed by: HOSPITALIST

## 2019-08-07 PROCEDURE — 36415 COLL VENOUS BLD VENIPUNCTURE: CPT | Performed by: HOSPITALIST

## 2019-08-07 PROCEDURE — 97530 THERAPEUTIC ACTIVITIES: CPT | Mod: GO

## 2019-08-07 PROCEDURE — 83735 ASSAY OF MAGNESIUM: CPT | Performed by: PHYSICIAN ASSISTANT

## 2019-08-07 PROCEDURE — 99232 SBSQ HOSP IP/OBS MODERATE 35: CPT | Performed by: HOSPITALIST

## 2019-08-07 PROCEDURE — 36415 COLL VENOUS BLD VENIPUNCTURE: CPT | Performed by: PHYSICIAN ASSISTANT

## 2019-08-07 PROCEDURE — 25000128 H RX IP 250 OP 636: Performed by: INTERNAL MEDICINE

## 2019-08-07 RX ORDER — POLYETHYLENE GLYCOL 3350 17 G/17G
238 POWDER, FOR SOLUTION ORAL ONCE
Status: DISCONTINUED | OUTPATIENT
Start: 2019-08-07 | End: 2019-08-07

## 2019-08-07 RX ADMIN — MULTIPLE VITAMINS W/ MINERALS TAB 1 TABLET: TAB at 09:21

## 2019-08-07 RX ADMIN — TAMSULOSIN HYDROCHLORIDE 0.4 MG: 0.4 CAPSULE ORAL at 09:21

## 2019-08-07 RX ADMIN — RIFAXIMIN 550 MG: 550 TABLET ORAL at 12:21

## 2019-08-07 RX ADMIN — DULOXETINE HYDROCHLORIDE 60 MG: 60 CAPSULE, DELAYED RELEASE ORAL at 09:21

## 2019-08-07 RX ADMIN — LACTULOSE 10 G: 10 SOLUTION ORAL at 21:44

## 2019-08-07 RX ADMIN — MIRTAZAPINE 30 MG: 30 TABLET, FILM COATED ORAL at 21:44

## 2019-08-07 RX ADMIN — SODIUM CHLORIDE: 9 INJECTION, SOLUTION INTRAVENOUS at 09:40

## 2019-08-07 RX ADMIN — PANTOPRAZOLE SODIUM 40 MG: 40 TABLET, DELAYED RELEASE ORAL at 09:21

## 2019-08-07 RX ADMIN — POLYETHYLENE GLYCOL 3350, SODIUM SULFATE ANHYDROUS, SODIUM BICARBONATE, SODIUM CHLORIDE, POTASSIUM CHLORIDE 4000 ML: 236; 22.74; 6.74; 5.86; 2.97 POWDER, FOR SOLUTION ORAL at 15:20

## 2019-08-07 RX ADMIN — HYDROXYZINE HYDROCHLORIDE 25 MG: 25 TABLET ORAL at 21:43

## 2019-08-07 RX ADMIN — LACTULOSE 10 G: 10 SOLUTION ORAL at 09:21

## 2019-08-07 RX ADMIN — FOLIC ACID 1 MG: 1 TABLET ORAL at 09:21

## 2019-08-07 RX ADMIN — NADOLOL 40 MG: 40 TABLET ORAL at 09:21

## 2019-08-07 RX ADMIN — TRAZODONE HYDROCHLORIDE 150 MG: 100 TABLET ORAL at 00:38

## 2019-08-07 RX ADMIN — ATORVASTATIN CALCIUM 10 MG: 10 TABLET, FILM COATED ORAL at 21:44

## 2019-08-07 RX ADMIN — HYDROMORPHONE HYDROCHLORIDE 0.3 MG: 1 INJECTION, SOLUTION INTRAMUSCULAR; INTRAVENOUS; SUBCUTANEOUS at 12:18

## 2019-08-07 RX ADMIN — MAGNESIUM SULFATE IN WATER 4 G: 40 INJECTION, SOLUTION INTRAVENOUS at 14:21

## 2019-08-07 RX ADMIN — FLUTICASONE FUROATE AND VILANTEROL TRIFENATATE 1 PUFF: 200; 25 POWDER RESPIRATORY (INHALATION) at 09:21

## 2019-08-07 RX ADMIN — CEFTRIAXONE SODIUM 2 G: 2 INJECTION, POWDER, FOR SOLUTION INTRAMUSCULAR; INTRAVENOUS at 19:03

## 2019-08-07 RX ADMIN — RIFAXIMIN 550 MG: 550 TABLET ORAL at 21:44

## 2019-08-07 ASSESSMENT — ACTIVITIES OF DAILY LIVING (ADL)
ADLS_ACUITY_SCORE: 10.5
ADLS_ACUITY_SCORE: 10.5
ADLS_ACUITY_SCORE: 15
ADLS_ACUITY_SCORE: 15
ADLS_ACUITY_SCORE: 10.5
ADLS_ACUITY_SCORE: 15

## 2019-08-07 NOTE — PLAN OF CARE
VSS, when on R/A patients sats drop down below 88%. Currently on 1 L oxygen. Disoriented to place, time and situation. Lung sounds diminished with expiratory wheezes. Abdomen is round, distended, firm with abdominal veins. Right side of abdomen dressing CDI. Incontinent of bowel. borderline urine output from jiang catheter (MD aware). Catheter is tea colored. Pain controlled with Dilaudid IV PRN. Sitter at bedside.  Will start bowel prep tonight for colonoscopy tomorrow.

## 2019-08-07 NOTE — PROGRESS NOTES
[facility]    Hospitalist Progress Note    Assessment & Plan   Jim Richard is a 64 year old male with history of alcohol dependence, alcoholic liver disease, depression and anxiety, history of GI bleed secondary to esophageal varices presented to [facility] emergency room with dark-colored stools ongoing for the last 1 week associated with some loose bowel movements.    Melena  Suspected upper GI bleed  Alcoholic liver disease  Esophageal varices   Acute blood loss anemia secondary to GI bleed  - GI consulted, appreciate their assistance.  - EGD showed esophagitis, no significant esophageal varices.  - GI planning colonoscopy tomorrow.  - Continue oral protonix.  - Hemoglobin down slightly to 10.2 this morning.   - Received two units PRBCs on 8/1/19.  - Completes 7 day course of rocephin for SBP prophylaxis today.  - Nadolol added on 8/4/19 for portal hypertension and varices.  - Ammonia mildly elevated previously then trended down. ? element of hepatic encephalopathy, appreciate GI input.   - Started on rifaximin and lactulose by GI.  - Monitor hemoglobin.  - CT abdomen/pelvis obtained 8/3/19 due to firm abdominal mass palpated on exam, was likely hepatomegaly.  - Continue IV fluids until reliably tolerating oral intake.     Alcohol dependence and alcohol withdrawal  - Patient complains of being shaky since stopped drinking 1 week ago.  - He was placed on CIWA protocol with Ativan as needed dosing per CIWA protocol.  - Had significant withdrawal symptoms starting on 8/3/19 and has required large doses of lorazepam. CIWA scores range from 6-7 over the past 24 hours, ativan requirement decreasing.  - Has a one-to-one sitter.  - Continue thiamine, folic acid, and multivitamin.  - Psychiatry consulted.    Depression/anxiety  - Continue PTA duloxetine and remeron.     Ascites  - S/p ultrasound guided paracentesis on 8/2/19 with removal of 3.2 liters of fluid.  - Cell count not suggestive  of SBP. Culture negative.  - Already on empiric rocephin (day #7/7) for SBP prophylaxis as noted above.  - Consider adding diuretics when more stable and tolerating oral intake.  - May need repeat paracentesis in next 1-2 days.     Elevated LFTs  - Due to alcoholic liver disease.  - LFT's slowly trending down.    Itchy eyes  - Artificial tears help only mildly.  - ? Allergic conjunctivitis, continue PRN antihistamine eye drop.    Hypokalemia  Hypomagnesemia  Hypophosphatemia  - Replace and recheck per protocol.    DVT Prophylaxis: Pneumatic Compression Devices  Code Status: DNR/DNI  Expected discharge: 2 - 3 days, recommended to prior living arrangement once alcohol withdrawal symptoms resolved.    Arnalod Hughes MD  Text Page  (7am - 6pm, M-F)    Interval History   Jim CALDERON Jose Manuel complains of abdominal fullness. Occasional nausea. Having loose stools, started on lactulose yesterday. Denies fevers, chest pain, shortness of breath. Has a Leger catheter in place. Discussed plan of care.    -Data reviewed today: I reviewed all new labs and imaging results over the last 24 hours. I personally reviewed no images or EKG's today.    Physical Exam   Temp: 98.3  F (36.8  C) Temp src: Oral BP: 121/67 Pulse: 54 Heart Rate: 61 Resp: 16 SpO2: 96 % O2 Device: Nasal cannula Oxygen Delivery: 1 LPM  Vitals:    08/05/19 0400 08/06/19 0903 08/07/19 0627   Weight: 83.2 kg (183 lb 6.8 oz) 85.9 kg (189 lb 6 oz) 85.2 kg (187 lb 13.3 oz)     Vital Signs with Ranges  Temp:  [97.7  F (36.5  C)-98.9  F (37.2  C)] 98.3  F (36.8  C)  Pulse:  [54-65] 54  Heart Rate:  [61] 61  Resp:  [16-18] 16  BP: (121-133)/(67-79) 121/67  SpO2:  [92 %-98 %] 96 %  I/O last 3 completed shifts:  In: 2265 [P.O.:210; I.V.:2055]  Out: 675 [Urine:675]    Constitutional: Alert, cooperative, no apparent distress, laying in bed  Respiratory: Clear to auscultation bilaterally, no crackles or wheezing  Cardiovascular: Regular rate and rhythm, normal S1 and S2, and  no murmur noted, 2+ edema  GI: Normal bowel sounds, soft, distended, generalized tenderness, no rebound tenderness or guarding  Skin/Integument: No rashes, no cyanosis  Neurologic: Alert, oriented to person, knew he is in the hospital, thought the year was 1999 again, knew that Virgilio Flores is president, tremor resolved, no asterixis     Medications     - MEDICATION INSTRUCTIONS -       sodium chloride 75 mL/hr at 08/07/19 0940       atorvastatin  10 mg Oral At Bedtime     cefTRIAXone  2 g Intravenous Q24H     DULoxetine  60 mg Oral Daily     fluticasone-vilanterol  1 puff Inhalation Daily     folic acid  1 mg Oral Daily     lactulose  10 g Oral BID     mirtazapine  30 mg Oral At Bedtime     multivitamin w/minerals  1 tablet Oral Daily     nadolol  40 mg Oral Daily     pantoprazole  40 mg Oral QAM AC     polyethylene glycol  4,000 mL Oral or NG Tube Once     rifaximin  550 mg Oral BID     sodium chloride (PF)  3 mL Intracatheter Q8H     tamsulosin  0.4 mg Oral Daily     Data   Recent Labs   Lab 08/07/19  0731 08/06/19  0739 08/05/19  0615  08/01/19  1629   WBC 4.8 5.2 6.0   < > 3.8*   HGB 10.2* 10.8* 11.0*   < > 8.7*   MCV 92 91 91   < > 95    191 171   < > 155   INR  --  1.31*  --   --  1.18*    136 134   < > 132*   POTASSIUM 3.7 3.5 3.5   < > 3.8   CHLORIDE 106 104 100   < > 95   CO2 23 25 28   < > 27   BUN 16 15 14   < > 23   CR 0.87 0.88 0.86   < > 0.93   ANIONGAP 8 7 6   < > 10   KEV 7.7* 7.8* 8.0*   < > 7.9*   GLC 76 89 92   < > 75   ALBUMIN  --  1.9* 2.0*   < > 2.3*   PROTTOTAL  --  5.5* 5.7*   < > 6.5*   BILITOTAL  --  4.3* 4.6*   < > 8.0*   ALKPHOS  --  290* 276*   < > 305*   ALT  --  39 46   < > 53   AST  --  86* 99*   < > 183*    < > = values in this interval not displayed.     No results found for this or any previous visit (from the past 24 hour(s)).

## 2019-08-07 NOTE — PLAN OF CARE
PT: Attempted to see pt this AM, but pt sleeping soundly and did not wake up after saying his name several times. Pt briefly opened his eyes and shook his head no to working with therapy and went back to sleep.

## 2019-08-07 NOTE — PLAN OF CARE
Discharge Planner OT   Patient plan for discharge: Not discussed  Current status: Pt was able to complete Supine to Sit with SBA. Pt stated he was dizzy when initially sitting. BP was taken and read 115/75. Pt was able to perform STS transfer with Mod A. Pt was able to complete seated tooth brushing with Set Up A. Pt required VC for walker safety and proper hand position throughout.   Barriers to return to prior living situation: Level of A for ADLs, cognition, falls risk  Recommendations for discharge: TCU  Rationale for recommendations: Patient is currently below IND baseline. Patient would benefit from ongoing OT to increase activity tolerance, cognition, strength, and IND with ADLs.        Entered by: Kolby Powell 08/07/2019 3:51 PM

## 2019-08-07 NOTE — PROVIDER NOTIFICATION
Text page Hospitalist regarding low urine output. 175 ml of tea colored urine from jiang catheter on AM shift.

## 2019-08-07 NOTE — PLAN OF CARE
Oriented for first fours hours of shift. Only oriented to self for the rest of shift. Patient restless, and pulling at IV, and jiang majority of night. CIWA score of 7,11,7. VSS, on 1lpm NC. Tele: SD with BBB. LS diminished. BS hyperactive, +BS. Abd distended and firm. Jiang patent, borderline UOP.  Repositioning himself independently. On clear liquid diet. No appetite. +1-2 BLE edematous. Sitter at bedside.

## 2019-08-07 NOTE — PROGRESS NOTES
[facility]  Gastroenterology Progress Note     Jim Richard MRN# [id number]   YOB: 1955 Age: 64 year old          Assessment and Plan:     GI bleed- Patient has had stable hemoglobin around 10-11. No active signs of bleeding. Patient less somnolent and able to communicate. Still confused. Has had an EGD that revealed esophagitis with no evidence of active GI bleed. Will plan colonoscopy tomorrow.  Continue with pantoprazole for esophagitis. NPO after midnight.  Cirrhosis- Ammonia has been in normal range. On lactulose and has been having 2-3 bowel movements daily.  Abdomen slightly distended from ascites. May need repeat paracentesis tomorrow or following day. Patient not complaining of abdominal pain.  Continue with serial hemoglobin. INR 1.31 Platelets normal.  LFTs have trended down. Receiving Ativan for alcohol withdrawal.  Discussed alcohol dependence and risk of continuing to consume alcohol. Recommended outpatient or inpatient alcohol dependence treatment. Patient will need ongoing care for liver cirrhosis.  I have discussed this with the patient and he is aware we are willing to provide that service.         Gastrointestinal hemorrhage, unspecified gastrointestinal hemorrhage type      Interval History:   no new complaints, doing well, denies chest pain, denies shortness of breath, denies abdominal pain, has had a bowel movement in the last 24 hours and doing well; no cp, sob, n/v/d, or abd pain.              Review of Systems:   C: NEGATIVE for fever, chills, change in weight  E/M: NEGATIVE for ear, mouth and throat problems  R: NEGATIVE for significant cough or SOB  CV: NEGATIVE for chest pain, palpitations or peripheral edema             Medications:   I have reviewed this patient's current medications    atorvastatin  10 mg Oral At Bedtime     cefTRIAXone  2 g Intravenous Q24H     DULoxetine  60 mg Oral Daily     fluticasone-vilanterol  1 puff Inhalation Daily      folic acid  1 mg Oral Daily     lactulose  10 g Oral BID     mirtazapine  30 mg Oral At Bedtime     multivitamin w/minerals  1 tablet Oral Daily     nadolol  40 mg Oral Daily     pantoprazole  40 mg Oral QAM AC     polyethylene glycol  4,000 mL Oral or NG Tube Once     polyethylene glycol  238 g Oral Once     sodium chloride (PF)  3 mL Intracatheter Q8H     tamsulosin  0.4 mg Oral Daily                  Physical Exam:   Vitals were reviewed  Vital Signs with Ranges  Temp:  [97.7  F (36.5  C)-98.9  F (37.2  C)] 97.7  F (36.5  C)  Pulse:  [54-65] 54  Heart Rate:  [61] 61  Resp:  [18-19] 18  BP: (128-133)/(72-79) 128/72  SpO2:  [92 %-98 %] 98 %  I/O last 3 completed shifts:  In: 2265 [P.O.:210; I.V.:2055]  Out: 675 [Urine:675]  Constitutional: healthy, alert and no distress   Cardiovascular: negative, PMI normal. No lifts, heaves, or thrills. RRR. No murmurs, clicks gallops or rub  Respiratory: negative, Percussion normal. Good diaphragmatic excursion. Lungs clear  Head: Normocephalic. No masses, lesions, tenderness or abnormalities  Neck: Neck supple. No adenopathy. Thyroid symmetric, normal size,, Carotids without bruits.  Abdomen: Abdomen soft, non-tender. BS normal. No masses, organomegaly, positive findings: distended           Data:   I reviewed the patient's new clinical lab test results.   Recent Labs   Lab Test 08/07/19  0731 08/06/19  0739 08/05/19  0615  08/01/19  1629  09/29/18  0550   WBC 4.8 5.2 6.0   < > 3.8*   < > 6.1   HGB 10.2* 10.8* 11.0*   < > 8.7*   < > 10.1*   MCV 92 91 91   < > 95   < > 74*    191 171   < > 155   < > 207   INR  --  1.31*  --   --  1.18*  --  1.10    < > = values in this interval not displayed.     Recent Labs   Lab Test 08/07/19  0731 08/06/19  0739 08/05/19  0615   POTASSIUM 3.7 3.5 3.5   CHLORIDE 106 104 100   CO2 23 25 28   BUN 16 15 14   ANIONGAP 8 7 6     Recent Labs   Lab Test 08/06/19  0739 08/05/19  0615 08/03/19  0546  02/13/19  2340  09/29/18  0550   03/21/18  1555  09/25/14  0510  06/12/12  0550   ALBUMIN 1.9* 2.0* 2.1*   < > 3.1*   < > 3.2*   < > 2.9*   < >  --    < >  --    BILITOTAL 4.3* 4.6* 4.3*   < > 0.4   < > 1.0   < > 0.9   < >  --    < >  --    ALT 39 46 42   < > 41   < > 60   < > 74*   < >  --    < >  --    AST 86* 99* 108*   < > 89*   < > 140*   < > 136*   < >  --    < >  --    PROTEIN  --   --   --   --   --   --   --   --   --   --  Negative  --  Negative   LIPASE  --   --   --   --  536*  --  585*  --  316  --   --   --   --     < > = values in this interval not displayed.       I reviewed the patient's new imaging results.    All laboratory data reviewed  All imaging studies reviewed by me.    Gloria De Leon PA-C,  8/7/2019  [facility]  Office : [phone]  Cell: [phone] (Dr. [facility])  Cell: [phone] (Gloria De Leon PA-C)

## 2019-08-07 NOTE — PROGRESS NOTES
GI update note:    -author was able to resolve the consent issue with his PCP Dr. Talon Elias, will proceed with colonoscopy tmr  -please give 4L golytely prep if tolerated, 2 L this evening and 2 L tmr early morning   -added rifaximin for trial of hepatic encephalopathy  -above discussed with Dr. Ellen Arguello (Felipe), MD [facility]

## 2019-08-08 ENCOUNTER — APPOINTMENT (OUTPATIENT)
Dept: GENERAL RADIOLOGY | Facility: CLINIC | Age: 64
DRG: 369 | End: 2019-08-08
Attending: HOSPITALIST
Payer: MEDICARE

## 2019-08-08 ENCOUNTER — RESULTS ONLY (OUTPATIENT)
Dept: ENDOSCOPY | Facility: CLINIC | Age: 64
End: 2019-08-08

## 2019-08-08 LAB
ALBUMIN SERPL-MCNC: 1.8 G/DL (ref 3.4–5)
ALP SERPL-CCNC: 262 U/L (ref 40–150)
ALT SERPL W P-5'-P-CCNC: 29 U/L (ref 0–70)
ANION GAP SERPL CALCULATED.3IONS-SCNC: 7 MMOL/L (ref 3–14)
AST SERPL W P-5'-P-CCNC: 52 U/L (ref 0–45)
BILIRUB SERPL-MCNC: 3.8 MG/DL (ref 0.2–1.3)
BUN SERPL-MCNC: 16 MG/DL (ref 7–30)
CALCIUM SERPL-MCNC: 7.7 MG/DL (ref 8.5–10.1)
CHLORIDE SERPL-SCNC: 107 MMOL/L (ref 94–109)
CO2 SERPL-SCNC: 24 MMOL/L (ref 20–32)
COLONOSCOPY: NORMAL
CREAT SERPL-MCNC: 0.96 MG/DL (ref 0.66–1.25)
ERYTHROCYTE [DISTWIDTH] IN BLOOD BY AUTOMATED COUNT: 18.2 % (ref 10–15)
GFR SERPL CREATININE-BSD FRML MDRD: 82 ML/MIN/{1.73_M2}
GLUCOSE SERPL-MCNC: 79 MG/DL (ref 70–99)
HCT VFR BLD AUTO: 28.5 % (ref 40–53)
HGB BLD-MCNC: 9.6 G/DL (ref 13.3–17.7)
MAGNESIUM SERPL-MCNC: 1.9 MG/DL (ref 1.6–2.3)
MCH RBC QN AUTO: 30.6 PG (ref 26.5–33)
MCHC RBC AUTO-ENTMCNC: 33.7 G/DL (ref 31.5–36.5)
MCV RBC AUTO: 91 FL (ref 78–100)
PLATELET # BLD AUTO: 186 10E9/L (ref 150–450)
POTASSIUM SERPL-SCNC: 3.4 MMOL/L (ref 3.4–5.3)
PROT SERPL-MCNC: 5.3 G/DL (ref 6.8–8.8)
RBC # BLD AUTO: 3.14 10E12/L (ref 4.4–5.9)
SODIUM SERPL-SCNC: 138 MMOL/L (ref 133–144)
WBC # BLD AUTO: 4.6 10E9/L (ref 4–11)

## 2019-08-08 PROCEDURE — 36415 COLL VENOUS BLD VENIPUNCTURE: CPT | Performed by: HOSPITALIST

## 2019-08-08 PROCEDURE — 25000132 ZZH RX MED GY IP 250 OP 250 PS 637: Mod: GY | Performed by: INTERNAL MEDICINE

## 2019-08-08 PROCEDURE — 99232 SBSQ HOSP IP/OBS MODERATE 35: CPT | Performed by: HOSPITALIST

## 2019-08-08 PROCEDURE — 71046 X-RAY EXAM CHEST 2 VIEWS: CPT

## 2019-08-08 PROCEDURE — G0500 MOD SEDAT ENDO SERVICE >5YRS: HCPCS | Performed by: INTERNAL MEDICINE

## 2019-08-08 PROCEDURE — 12000000 ZZH R&B MED SURG/OB

## 2019-08-08 PROCEDURE — 80053 COMPREHEN METABOLIC PANEL: CPT | Performed by: HOSPITALIST

## 2019-08-08 PROCEDURE — 25800030 ZZH RX IP 258 OP 636: Performed by: HOSPITALIST

## 2019-08-08 PROCEDURE — 25000128 H RX IP 250 OP 636: Performed by: INTERNAL MEDICINE

## 2019-08-08 PROCEDURE — 83735 ASSAY OF MAGNESIUM: CPT | Performed by: HOSPITALIST

## 2019-08-08 PROCEDURE — 45378 DIAGNOSTIC COLONOSCOPY: CPT | Performed by: INTERNAL MEDICINE

## 2019-08-08 PROCEDURE — 88305 TISSUE EXAM BY PATHOLOGIST: CPT | Mod: 26 | Performed by: INTERNAL MEDICINE

## 2019-08-08 PROCEDURE — 88305 TISSUE EXAM BY PATHOLOGIST: CPT | Performed by: INTERNAL MEDICINE

## 2019-08-08 PROCEDURE — 85027 COMPLETE CBC AUTOMATED: CPT | Performed by: HOSPITALIST

## 2019-08-08 PROCEDURE — 25000132 ZZH RX MED GY IP 250 OP 250 PS 637: Mod: GY | Performed by: HOSPITALIST

## 2019-08-08 RX ORDER — LIDOCAINE 40 MG/G
CREAM TOPICAL
Status: DISCONTINUED | OUTPATIENT
Start: 2019-08-08 | End: 2019-08-08 | Stop reason: HOSPADM

## 2019-08-08 RX ORDER — FLUMAZENIL 0.1 MG/ML
0.2 INJECTION, SOLUTION INTRAVENOUS
Status: ACTIVE | OUTPATIENT
Start: 2019-08-08 | End: 2019-08-09

## 2019-08-08 RX ORDER — FENTANYL CITRATE 50 UG/ML
INJECTION, SOLUTION INTRAMUSCULAR; INTRAVENOUS PRN
Status: DISCONTINUED | OUTPATIENT
Start: 2019-08-08 | End: 2019-08-08 | Stop reason: HOSPADM

## 2019-08-08 RX ORDER — MAGNESIUM CARB/ALUMINUM HYDROX 105-160MG
296 TABLET,CHEWABLE ORAL ONCE
Status: COMPLETED | OUTPATIENT
Start: 2019-08-08 | End: 2019-08-08

## 2019-08-08 RX ORDER — NALOXONE HYDROCHLORIDE 0.4 MG/ML
.1-.4 INJECTION, SOLUTION INTRAMUSCULAR; INTRAVENOUS; SUBCUTANEOUS
Status: ACTIVE | OUTPATIENT
Start: 2019-08-08 | End: 2019-08-09

## 2019-08-08 RX ORDER — DEXMEDETOMIDINE HYDROCHLORIDE 4 UG/ML
0.2-0.7 INJECTION, SOLUTION INTRAVENOUS CONTINUOUS
Status: DISCONTINUED | OUTPATIENT
Start: 2019-08-08 | End: 2019-08-08

## 2019-08-08 RX ADMIN — RIFAXIMIN 550 MG: 550 TABLET ORAL at 21:12

## 2019-08-08 RX ADMIN — PROCHLORPERAZINE EDISYLATE 5 MG: 5 INJECTION INTRAMUSCULAR; INTRAVENOUS at 08:50

## 2019-08-08 RX ADMIN — FLUTICASONE FUROATE AND VILANTEROL TRIFENATATE 1 PUFF: 200; 25 POWDER RESPIRATORY (INHALATION) at 13:00

## 2019-08-08 RX ADMIN — HYDROMORPHONE HYDROCHLORIDE 0.3 MG: 1 INJECTION, SOLUTION INTRAMUSCULAR; INTRAVENOUS; SUBCUTANEOUS at 20:30

## 2019-08-08 RX ADMIN — MIRTAZAPINE 30 MG: 30 TABLET, FILM COATED ORAL at 21:12

## 2019-08-08 RX ADMIN — HYDROMORPHONE HYDROCHLORIDE 0.3 MG: 1 INJECTION, SOLUTION INTRAMUSCULAR; INTRAVENOUS; SUBCUTANEOUS at 05:29

## 2019-08-08 RX ADMIN — ONDANSETRON 4 MG: 2 INJECTION INTRAMUSCULAR; INTRAVENOUS at 05:37

## 2019-08-08 RX ADMIN — LACTULOSE 10 G: 10 SOLUTION ORAL at 21:12

## 2019-08-08 RX ADMIN — SODIUM CHLORIDE: 9 INJECTION, SOLUTION INTRAVENOUS at 00:13

## 2019-08-08 RX ADMIN — SODIUM CHLORIDE: 9 INJECTION, SOLUTION INTRAVENOUS at 23:36

## 2019-08-08 RX ADMIN — PANTOPRAZOLE SODIUM 40 MG: 40 TABLET, DELAYED RELEASE ORAL at 06:38

## 2019-08-08 RX ADMIN — LORAZEPAM 1 MG: 0.5 TABLET ORAL at 06:41

## 2019-08-08 RX ADMIN — HYDROMORPHONE HYDROCHLORIDE 0.3 MG: 1 INJECTION, SOLUTION INTRAMUSCULAR; INTRAVENOUS; SUBCUTANEOUS at 08:14

## 2019-08-08 RX ADMIN — MICONAZOLE NITRATE: 20 POWDER TOPICAL at 08:15

## 2019-08-08 RX ADMIN — TRAZODONE HYDROCHLORIDE 150 MG: 100 TABLET ORAL at 22:02

## 2019-08-08 RX ADMIN — ATORVASTATIN CALCIUM 10 MG: 10 TABLET, FILM COATED ORAL at 21:12

## 2019-08-08 RX ADMIN — LORAZEPAM 1 MG: 0.5 TABLET ORAL at 00:11

## 2019-08-08 RX ADMIN — ALBUTEROL SULFATE 2 PUFF: 90 AEROSOL, METERED RESPIRATORY (INHALATION) at 09:09

## 2019-08-08 RX ADMIN — SODIUM CHLORIDE: 9 INJECTION, SOLUTION INTRAVENOUS at 13:00

## 2019-08-08 RX ADMIN — MAGNESIUM CITRATE 296 ML: 1.75 LIQUID ORAL at 09:40

## 2019-08-08 ASSESSMENT — ACTIVITIES OF DAILY LIVING (ADL)
ADLS_ACUITY_SCORE: 15
ADLS_ACUITY_SCORE: 13
ADLS_ACUITY_SCORE: 15
ADLS_ACUITY_SCORE: 15

## 2019-08-08 NOTE — PLAN OF CARE
VSS, Tele SR with BBB, Alert to self, disoriented to time, place and situation. Lung sounds diminished with expiratory wheezes. Patient c/o SOB- improved with PRN Albuterol inhaler. Sclera yellow, jaundice. CIWA sore under 8. Abdomen is round with vein, Distended, firm. Nausea- given Compazine PRN. Bowel sounds is hyperactive, water stools- incontinent.  Took in rest of colonoscopy prep. Swelling noted in the flank, and bilateral LE's. Urine in jiang catheter is tea colored. Coccyx is pink but blanchable. Up with assist of 2 to bedside commode. NPO except the bowel prep. Pain to his left flank and right abdomen- improved with IV Dialudid. Bruising noted right hip and left flank area. Baseline numbness/tingling in bilateral LE's. Turn and repo every 2 hours and PRN. Continues to pull at oxygen. Sitter at bedside.  Patient went down to [facility] at approx 2:30 pm. Message left for patients brother to let him know that patient will be transferring to [facility] after his colonoscopy. Report given to station 66 nurse.     -- All belongings including cell phone and wallet packet up in a bag and sent down with patient to [facility].

## 2019-08-08 NOTE — PROGRESS NOTES
Bowel prep completed. Updated hospitalist regarding  Patient c/o: SOB (given PRN albuterol inhaler), nausea, Left sided flank pain- bruising and edema on flank. Belly is very distended and firm with abdominal veins. Hgb dropped to 9.6. Urine output from SHIRA- NOC was 300 ml.

## 2019-08-08 NOTE — PLAN OF CARE
DATE & TIME: 8/8/2019 6579-8732   Cognitive Concerns/ Orientation : A&O x4  BEHAVIOR & AGGRESSION TOOL COLOR: green   CIWA SCORE: 1/4 for tremor.   ABNL VS/O2: vss, on RA while awake, Fine crackles noted on right LL. C/o SOB, unchanged from am shift. O2 sat dropped to 87% when sleeping. Pt uses CPAP at home. Placed on 1 LPM NC.   MOBILITY: Ax1 with GB/WK to chair for dinner.   PAIN MANAGMENT: Right rib cage and neck pain, fell prior to admission, PRN dilaudid IV given per request. Tylenol for fever only.   DIET: regular.   BOWEL/BLADDER: INC of bowel. Leger in place. Dark tea color urine noted. Low urine output-- averaging 100-200 ml each shift.  MD was updated during am shift, would consider albumin if worsens. Abd distended. Will have paracentesis in am, NO NPO NEEDED.   ABNL LAB/BG: WBC 9.6, trending down from yesterday 10.2. CXR suggested right lung base PNA. Pt just finished 6 days of IV Ceftriaxone.   DRAIN/DEVICES: PIV on right arm, infusing.   TELEMETRY RHYTHM: SB with R BBB  SKIN: bruises throughout, bottom excoriated, using miconazole powder PRN.   TESTS/PROCEDURES: will have paracentesis in am.   D/C DAY/GOALS/PLACE: 2-3 days.   OTHER IMPORTANT INFO: na

## 2019-08-08 NOTE — PROGRESS NOTES
SW: Consult acknowledged. Pt scheduled for colonoscopy today at 1500 if bowel prep is successfully completed. Pt currently with bedside attendant. SW will follow and assist with any service needs at discharge when appropriate.    JULIA Trinidad, LICSW  Daytime (8:00am-4:30pm): [phone]  After-Hours SW Pager (4:30pm-11:30pm): [phone]

## 2019-08-08 NOTE — PLAN OF CARE
DATE & TIME: 8/7/2019 3060-1276    Cognitive Concerns/ Orientation : A+Ox2   BEHAVIOR & AGGRESSION TOOL COLOR: Green, calm, confused  CIWA SCORE: 7,6   ABNL VS/O2: None  MOBILITY: Assist x2 to commode  PAIN MANAGMENT: Denies  DIET: NPO for colonoscopy  BOWEL/BLADDER: Diarrhea + bowel prep  ABNL LAB/BG: Ammonia  DRAIN/DEVICES: Leger  TELEMETRY RHYTHM: NA  SKIN: Red Blanchable  TESTS/PROCEDURES: Colonoscopy  D/C DAY/GOALS/PLACE: TBD  OTHER IMPORTANT INFO: Letitia

## 2019-08-08 NOTE — PROGRESS NOTES
Received in report that patient only drank 2,000 ml of his bowel prep. Spoke to STELLA who stated that patient should drink the other 2,000 ml. If unable to drink the other 2,000ml then call MD.

## 2019-08-08 NOTE — CONSULTS
"CLINICAL NUTRITION SERVICES  -  ASSESSMENT NOTE      Malnutrition:   % Weight Loss:  > 5% in 1 month (severe malnutrition)  % Intake:  <75% for > 7 days (non-severe malnutrition)  Subcutaneous Fat Loss:  Unable to assess  Muscle Loss:  Unable to assess  Fluid Retention:  Unable to assess    Malnutrition Diagnosis: Non-Severe malnutrition  In Context of:  Acute illness or injury and Environmental or social circumstances     REASON FOR ASSESSMENT  Jim Richard is a 64 year old male seen by Registered Dietitian for LOS    Admitted with alcoholic liver disease, suspected upper GI bleed, Esophageal varices    NUTRITION HISTORY  - Unable to obtain nutrition history - pt in procedure during attempt to visit.     CURRENT NUTRITION ORDERS  Diet Order:     NPO for procedure colonoscopy this afternoon     Current Intake/Tolerance:            Pt has been on a CL diet from 8/3-8/8.    Per flowsheets, consuming 0-100% since admit. Intake variable, however pt has been NPO/FL/CL diet so far this admit (suspect 7 days with minimal intake).   BM x 9 yest, x4 so far today.     NUTRITION FOCUSED PHYSICAL ASSESSMENT FOR DIAGNOSING MALNUTRITION)  Completed:  No Patient not available     Observed from chart notes:  Ascites   2+ edema of lower extremities     ANTHROPOMETRICS  Height: 5'7\"   Weight:  85.3 kg (8/8)  Body mass index is 29.45 kg/m .   Weight Status: Overweight BMI 25-29.9  IBW:  67.3 kg   %IBW: 127%    Weight History:   Wt Readings from Last 10 Encounters:   08/08/19 85.3 kg (188 lb 0.8 oz)   07/10/19 90.7 kg (200 lb)   07/03/19 90.7 kg (200 lb)   07/03/19 90.7 kg (200 lb)   02/13/19 97.5 kg (215 lb)   01/21/19 90.7 kg (200 lb)   12/24/18 99.8 kg (220 lb)   12/05/18 99.8 kg (220 lb)   05/21/18 86.2 kg (190 lb)   04/26/18 92.6 kg (204 lb 2.3 oz)   6% weight loss in the past 1 month.     LABS  Labs reviewed  Elevated LFT's - trending down (alcoholic liver disease)    MEDICATIONS  Medications reviewed  Folic Acid, " Thera-vit-m  NaCl at 75 ml/hr     ASSESSED NUTRITION NEEDS PER APPROVED PRACTICE GUIDELINES:    Dosing Weight 71.2 kg (adjusted for overweight using lowest wt this admit on 8/3)  Estimated Energy Needs: 7275-3094 kcals (25-30 Kcal/Kg)  Justification: maintenance  Estimated Protein Needs:  grams protein (1.2-1.5 g pro/Kg)  Justification: hypercatabolism with acute illness  Estimated Fluid Needs: 9390-6816 mL (25-30 mL/kg)  Justification: maintenance    MALNUTRITION:  % Weight Loss:  > 5% in 1 month (severe malnutrition)  % Intake:  <75% for > 7 days (non-severe malnutrition)  Subcutaneous Fat Loss:  Unable to assess  Muscle Loss:  Unable to assess  Fluid Retention:  Unable to assess    Malnutrition Diagnosis: Non-Severe malnutrition  In Context of:  Acute illness or injury    NUTRITION DIAGNOSIS:  Inadequate oral intake related to altered GI function as evidenced by variable intake 0-100% for the past 7 days and 6% weight loss in the past 1 month.       NUTRITION INTERVENTIONS  Recommendations / Nutrition Prescription  Advance diet as tolerated, per MD discretion.     Implementation  Nutrition education: Per Provider order if indicated     Nutrition Goals  Pt's diet will advance in the next 24-48 hrs.       MONITORING AND EVALUATION:  Progress towards goals will be monitored and evaluated per protocol and Practice Guidelines      Carla Arcadia  Nutrition Services

## 2019-08-08 NOTE — PLAN OF CARE
Pt is A&Ox2, VSS, on RA, PRN zofran given x1 with relief, tele: SB with BBB. CIWA scores 8/6/5/10, PRN ativan given x2 per CIWA criteria. Dsg to R ABD is CDI. ABD is distended and firm. LS diminished, BS+ normoactive, flatus+. Leger in place, is intact and patent, low UOP- dark tea colored. Pt is assist x2, been NPO since 0000. Is scheduled to have colonoscopy @ 1500.

## 2019-08-08 NOTE — PROGRESS NOTES
[facility]    Hospitalist Progress Note    Assessment & Plan   Jim Richard is a 64 year old male with history of alcohol dependence, alcoholic liver disease, depression and anxiety, history of GI bleed secondary to esophageal varices presented to [facility] emergency room with dark-colored stools ongoing for the last 1 week associated with some loose bowel movements.    Melena  Suspected upper GI bleed  Alcoholic liver disease  Esophageal varices   Acute blood loss anemia secondary to GI bleed  GI consulted, appreciate their assistance.  EGD showed esophagitis, no significant esophageal varices.  plan  - GI planning colonoscopy today  - Continue oral protonix  - Nadolol continuation  - continue rifaximin and lactulose by GI.  - Monitor hemoglobin.     Dyspnea  Likely secondary to his ascites and upward pressure on the diaphragm given his symptoms  Patient states it has been present for weeks, improved after previous paracentesis  Plan  - CXR and US with paracentesis  - continues on 1 liter of O2     Alcohol dependence and alcohol withdrawal  Patient complains of being shaky since stopped drinking 1 week ago.  He was placed on CIWA protocol with Ativan as needed dosing per CIWA protocol.  Plan  - continue ciwa protocol, still receiving some ativan  - Continue thiamine, folic acid, and multivitamin.  - Psychiatry consulted  - PT/OT    Depression/anxiety  - Continue PTA duloxetine and remeron.     Ascites  S/p ultrasound guided paracentesis on 8/2/19 with removal of 3.2 liters of fluid.  Cell count not suggestive of SBP. Culture negative.  Completed course of rocephin 7 days for empiric coverage  Plan  - therapeutic paracentesis ordered  - after this would start diuretics  - 500 ml of urine out so far today. Would hold on additional fluids, consider albumin if urine output worse     Elevated LFTs  - Due to alcoholic liver disease.  - LFT's slowly trending down.    Itchy eyes  -  Artificial tears help only mildly.  - ? Allergic conjunctivitis, continue PRN antihistamine eye drop.    Hypokalemia  Hypomagnesemia  Hypophosphatemia  - Replace and recheck per protocol.    DVT Prophylaxis: Pneumatic Compression Devices  Code Status: DNR/DNI  Expected discharge: 2 - 3 days, recommended to prior living arrangement once alcohol withdrawal symptoms resolved.    Mark Fan DO  Hospitalist [facility]  Pager: [phone]      Interval History   Jim CALDERON Jose Manuel complains of abdominal fullness. Some pain from the old paracentesis site  Completed prep for colonoscopy  Some mild sob, feels like his abdomen is pushing up from on his lungs making it difficult to take a deep breath    -Data reviewed today: I reviewed all new labs and imaging results over the last 24 hours. I personally reviewed no images or EKG's today.    Physical Exam   Temp: 98.6  F (37  C) Temp src: Oral BP: 125/58 Pulse: 60 Heart Rate: 59 Resp: 16 SpO2: 95 % O2 Device: Nasal cannula Oxygen Delivery: 1 LPM  Vitals:    08/06/19 0903 08/07/19 0627 08/08/19 0625   Weight: 85.9 kg (189 lb 6 oz) 85.2 kg (187 lb 13.3 oz) 85.3 kg (188 lb 0.8 oz)     Vital Signs with Ranges  Temp:  [98.4  F (36.9  C)-99.1  F (37.3  C)] 98.6  F (37  C)  Pulse:  [53-60] 60  Heart Rate:  [58-59] 59  Resp:  [16] 16  BP: (125-131)/(58-73) 125/58  SpO2:  [94 %-96 %] 95 %  I/O last 3 completed shifts:  In: 340 [P.O.:340]  Out: 475 [Urine:475]    Constitutional: Alert, cooperative, no apparent distress, laying in bed  Respiratory: Clear to auscultation bilaterally, no crackles or wheezing  Cardiovascular: Regular rate and rhythm, normal S1 and S2, and no murmur noted, 2+ edema  GI: Normal bowel sounds, soft, distended, generalized tenderness, no rebound tenderness or guarding  Skin/Integument: No rashes, no cyanosis  Neurologic: Alert, oriented to person, knew he is in the hospital. Again, thought it was 1999.    Medications     - MEDICATION INSTRUCTIONS -       sodium  chloride 75 mL/hr at 08/08/19 1300       atorvastatin  10 mg Oral At Bedtime     DULoxetine  60 mg Oral Daily     fluticasone-vilanterol  1 puff Inhalation Daily     folic acid  1 mg Oral Daily     lactulose  10 g Oral BID     mirtazapine  30 mg Oral At Bedtime     multivitamin w/minerals  1 tablet Oral Daily     nadolol  40 mg Oral Daily     pantoprazole  40 mg Oral QAM AC     rifaximin  550 mg Oral BID     sodium chloride (PF)  3 mL Intracatheter Q8H     sodium chloride (PF)  3 mL Intracatheter Q8H     tamsulosin  0.4 mg Oral Daily     Data   Recent Labs   Lab 08/08/19  0720 08/07/19  0731 08/06/19  0739  08/01/19  1629   WBC 4.6 4.8 5.2   < > 3.8*   HGB 9.6* 10.2* 10.8*   < > 8.7*   MCV 91 92 91   < > 95    184 191   < > 155   INR  --   --  1.31*  --  1.18*    137 136   < > 132*   POTASSIUM 3.4 3.7 3.5   < > 3.8   CHLORIDE 107 106 104   < > 95   CO2 24 23 25   < > 27   BUN 16 16 15   < > 23   CR 0.96 0.87 0.88   < > 0.93   ANIONGAP 7 8 7   < > 10   KEV 7.7* 7.7* 7.8*   < > 7.9*   GLC 79 76 89   < > 75   ALBUMIN 1.8*  --  1.9*   < > 2.3*   PROTTOTAL 5.3*  --  5.5*   < > 6.5*   BILITOTAL 3.8*  --  4.3*   < > 8.0*   ALKPHOS 262*  --  290*   < > 305*   ALT 29  --  39   < > 53   AST 52*  --  86*   < > 183*    < > = values in this interval not displayed.     No results found for this or any previous visit (from the past 24 hour(s)).

## 2019-08-09 ENCOUNTER — APPOINTMENT (OUTPATIENT)
Dept: ULTRASOUND IMAGING | Facility: CLINIC | Age: 64
DRG: 369 | End: 2019-08-09
Attending: INTERNAL MEDICINE
Payer: MEDICARE

## 2019-08-09 ENCOUNTER — APPOINTMENT (OUTPATIENT)
Dept: PHYSICAL THERAPY | Facility: CLINIC | Age: 64
DRG: 369 | End: 2019-08-09
Payer: MEDICARE

## 2019-08-09 ENCOUNTER — APPOINTMENT (OUTPATIENT)
Dept: OCCUPATIONAL THERAPY | Facility: CLINIC | Age: 64
DRG: 369 | End: 2019-08-09
Payer: MEDICARE

## 2019-08-09 LAB
ALBUMIN SERPL-MCNC: 1.9 G/DL (ref 3.4–5)
ALP SERPL-CCNC: 274 U/L (ref 40–150)
ALT SERPL W P-5'-P-CCNC: 29 U/L (ref 0–70)
ANION GAP SERPL CALCULATED.3IONS-SCNC: 7 MMOL/L (ref 3–14)
AST SERPL W P-5'-P-CCNC: 51 U/L (ref 0–45)
BILIRUB SERPL-MCNC: 3.6 MG/DL (ref 0.2–1.3)
BUN SERPL-MCNC: 18 MG/DL (ref 7–30)
CALCIUM SERPL-MCNC: 8 MG/DL (ref 8.5–10.1)
CHLORIDE SERPL-SCNC: 109 MMOL/L (ref 94–109)
CO2 SERPL-SCNC: 25 MMOL/L (ref 20–32)
CREAT SERPL-MCNC: 1.02 MG/DL (ref 0.66–1.25)
GFR SERPL CREATININE-BSD FRML MDRD: 77 ML/MIN/{1.73_M2}
GLUCOSE SERPL-MCNC: 93 MG/DL (ref 70–99)
HGB BLD-MCNC: 9.3 G/DL (ref 13.3–17.7)
POTASSIUM SERPL-SCNC: 3.7 MMOL/L (ref 3.4–5.3)
PROT SERPL-MCNC: 5.7 G/DL (ref 6.8–8.8)
SODIUM SERPL-SCNC: 141 MMOL/L (ref 133–144)

## 2019-08-09 PROCEDURE — 25000132 ZZH RX MED GY IP 250 OP 250 PS 637: Mod: GY | Performed by: HOSPITALIST

## 2019-08-09 PROCEDURE — 40000863 ZZH STATISTIC RADIOLOGY XRAY, US, CT, MAR, NM

## 2019-08-09 PROCEDURE — 97530 THERAPEUTIC ACTIVITIES: CPT | Mod: GP | Performed by: PHYSICAL THERAPY ASSISTANT

## 2019-08-09 PROCEDURE — 99232 SBSQ HOSP IP/OBS MODERATE 35: CPT | Performed by: HOSPITALIST

## 2019-08-09 PROCEDURE — 85018 HEMOGLOBIN: CPT | Performed by: PHYSICIAN ASSISTANT

## 2019-08-09 PROCEDURE — 80053 COMPREHEN METABOLIC PANEL: CPT | Performed by: HOSPITALIST

## 2019-08-09 PROCEDURE — 25000128 H RX IP 250 OP 636: Performed by: INTERNAL MEDICINE

## 2019-08-09 PROCEDURE — 25000132 ZZH RX MED GY IP 250 OP 250 PS 637: Mod: GY | Performed by: INTERNAL MEDICINE

## 2019-08-09 PROCEDURE — P9047 ALBUMIN (HUMAN), 25%, 50ML: HCPCS | Performed by: HOSPITALIST

## 2019-08-09 PROCEDURE — 0W9G3ZZ DRAINAGE OF PERITONEAL CAVITY, PERCUTANEOUS APPROACH: ICD-10-PCS | Performed by: PHYSICIAN ASSISTANT

## 2019-08-09 PROCEDURE — 36415 COLL VENOUS BLD VENIPUNCTURE: CPT | Performed by: HOSPITALIST

## 2019-08-09 PROCEDURE — 25000128 H RX IP 250 OP 636: Performed by: HOSPITALIST

## 2019-08-09 PROCEDURE — 27210190 US PARACENTESIS

## 2019-08-09 PROCEDURE — 25000125 ZZHC RX 250: Performed by: RADIOLOGY

## 2019-08-09 PROCEDURE — 12000000 ZZH R&B MED SURG/OB

## 2019-08-09 PROCEDURE — 97116 GAIT TRAINING THERAPY: CPT | Mod: GP | Performed by: PHYSICAL THERAPY ASSISTANT

## 2019-08-09 PROCEDURE — 36415 COLL VENOUS BLD VENIPUNCTURE: CPT | Performed by: PHYSICIAN ASSISTANT

## 2019-08-09 PROCEDURE — 97535 SELF CARE MNGMENT TRAINING: CPT | Mod: GO | Performed by: OCCUPATIONAL THERAPIST

## 2019-08-09 RX ORDER — HYDROCODONE BITARTRATE AND ACETAMINOPHEN 5; 325 MG/1; MG/1
1 TABLET ORAL EVERY 6 HOURS PRN
Status: DISCONTINUED | OUTPATIENT
Start: 2019-08-09 | End: 2019-08-12

## 2019-08-09 RX ORDER — ALBUMIN (HUMAN) 12.5 G/50ML
12.5 SOLUTION INTRAVENOUS 2 TIMES DAILY
Status: COMPLETED | OUTPATIENT
Start: 2019-08-09 | End: 2019-08-10

## 2019-08-09 RX ORDER — ALBUMIN (HUMAN) 12.5 G/50ML
12.5 SOLUTION INTRAVENOUS 2 TIMES DAILY
Status: DISCONTINUED | OUTPATIENT
Start: 2019-08-09 | End: 2019-08-09

## 2019-08-09 RX ORDER — CEFTRIAXONE 1 G/1
1 INJECTION, POWDER, FOR SOLUTION INTRAMUSCULAR; INTRAVENOUS EVERY 24 HOURS
Status: DISCONTINUED | OUTPATIENT
Start: 2019-08-09 | End: 2019-08-11

## 2019-08-09 RX ORDER — LIDOCAINE HYDROCHLORIDE 10 MG/ML
10 INJECTION, SOLUTION EPIDURAL; INFILTRATION; INTRACAUDAL; PERINEURAL ONCE
Status: COMPLETED | OUTPATIENT
Start: 2019-08-09 | End: 2019-08-09

## 2019-08-09 RX ORDER — NICOTINE POLACRILEX 4 MG
15-30 LOZENGE BUCCAL
Status: DISCONTINUED | OUTPATIENT
Start: 2019-08-09 | End: 2019-08-14 | Stop reason: HOSPADM

## 2019-08-09 RX ORDER — DEXTROSE MONOHYDRATE 25 G/50ML
25-50 INJECTION, SOLUTION INTRAVENOUS
Status: DISCONTINUED | OUTPATIENT
Start: 2019-08-09 | End: 2019-08-14 | Stop reason: HOSPADM

## 2019-08-09 RX ADMIN — ATORVASTATIN CALCIUM 10 MG: 10 TABLET, FILM COATED ORAL at 20:38

## 2019-08-09 RX ADMIN — TRAZODONE HYDROCHLORIDE 150 MG: 100 TABLET ORAL at 21:20

## 2019-08-09 RX ADMIN — TAMSULOSIN HYDROCHLORIDE 0.4 MG: 0.4 CAPSULE ORAL at 08:16

## 2019-08-09 RX ADMIN — PANTOPRAZOLE SODIUM 40 MG: 40 TABLET, DELAYED RELEASE ORAL at 06:34

## 2019-08-09 RX ADMIN — CEFTRIAXONE SODIUM 1 G: 1 INJECTION, POWDER, FOR SOLUTION INTRAMUSCULAR; INTRAVENOUS at 09:21

## 2019-08-09 RX ADMIN — LIDOCAINE HYDROCHLORIDE 10 ML: 10 INJECTION, SOLUTION EPIDURAL; INFILTRATION; INTRACAUDAL; PERINEURAL at 14:01

## 2019-08-09 RX ADMIN — MIRTAZAPINE 30 MG: 30 TABLET, FILM COATED ORAL at 20:38

## 2019-08-09 RX ADMIN — ALBUMIN HUMAN 12.5 G: 0.25 SOLUTION INTRAVENOUS at 10:13

## 2019-08-09 RX ADMIN — FOLIC ACID 1 MG: 1 TABLET ORAL at 08:16

## 2019-08-09 RX ADMIN — LACTULOSE 10 G: 10 SOLUTION ORAL at 20:11

## 2019-08-09 RX ADMIN — HYDROCODONE BITARTRATE AND ACETAMINOPHEN 1 TABLET: 5; 325 TABLET ORAL at 08:16

## 2019-08-09 RX ADMIN — LACTULOSE 10 G: 10 SOLUTION ORAL at 08:16

## 2019-08-09 RX ADMIN — HYDROMORPHONE HYDROCHLORIDE 0.3 MG: 1 INJECTION, SOLUTION INTRAMUSCULAR; INTRAVENOUS; SUBCUTANEOUS at 02:05

## 2019-08-09 RX ADMIN — HYDROCODONE BITARTRATE AND ACETAMINOPHEN 1 TABLET: 5; 325 TABLET ORAL at 20:10

## 2019-08-09 RX ADMIN — RIFAXIMIN 550 MG: 550 TABLET ORAL at 08:16

## 2019-08-09 RX ADMIN — ALBUMIN HUMAN 12.5 G: 0.25 SOLUTION INTRAVENOUS at 20:27

## 2019-08-09 RX ADMIN — RIFAXIMIN 550 MG: 550 TABLET ORAL at 20:10

## 2019-08-09 RX ADMIN — FLUTICASONE FUROATE AND VILANTEROL TRIFENATATE 1 PUFF: 200; 25 POWDER RESPIRATORY (INHALATION) at 08:45

## 2019-08-09 RX ADMIN — DULOXETINE HYDROCHLORIDE 60 MG: 60 CAPSULE, DELAYED RELEASE ORAL at 08:16

## 2019-08-09 RX ADMIN — MULTIPLE VITAMINS W/ MINERALS TAB 1 TABLET: TAB at 08:16

## 2019-08-09 RX ADMIN — NADOLOL 40 MG: 40 TABLET ORAL at 08:16

## 2019-08-09 ASSESSMENT — ACTIVITIES OF DAILY LIVING (ADL)
ADLS_ACUITY_SCORE: 14
ADLS_ACUITY_SCORE: 15
ADLS_ACUITY_SCORE: 13
ADLS_ACUITY_SCORE: 14
ADLS_ACUITY_SCORE: 14
ADLS_ACUITY_SCORE: 15

## 2019-08-09 NOTE — PROCEDURES
[facility]    Procedure: Paracentesis  Date/Time: 8/9/2019 2:09 PM  Performed by: Gloria Singletary PA-C  Authorized by: Gloria Singletary PA-C     UNIVERSAL PROTOCOL   Site Marked: Yes  Prior Images Obtained and Reviewed:  Yes  Required items: Required blood products, implants, devices and special equipment available    Patient identity confirmed:  Verbally with patient  NA - No sedation, light sedation, or local anesthesia  Confirmation Checklist:  Patient's identity using two indicators, relevant allergies, procedure was appropriate and matched the consent or emergent situation and correct equipment/implants were available  Time out: Immediately prior to the procedure a time out was called    Preparation: Patient was prepped and draped in usual sterile fashion    ESBL (mL):  0     ANESTHESIA    Anesthesia: Local infiltration  Local Anesthetic:  Lidocaine 1% without epinephrine  Anesthetic Total (mL):  10      SEDATION    Patient Sedated: No    See dictated procedure note for full details.  PROCEDURE   Patient Tolerance:  Patient tolerated the procedure well with no immediate complications    Time of Sedation in Minutes by Physician:  0

## 2019-08-09 NOTE — PROGRESS NOTES
[facility]    Hospitalist Progress Note    Assessment & Plan   Jim Richard is a 64 year old male with history of alcohol dependence, alcoholic liver disease, depression and anxiety, history of GI bleed secondary to esophageal varices presented to [facility] emergency room with dark-colored stools ongoing for the last 1 week associated with some loose bowel movements.    Melena  Suspected upper GI bleed  Alcoholic liver disease  Esophageal varices   Acute blood loss anemia secondary to GI bleed  GI consulted, appreciate their assistance.  EGD showed esophagitis, no significant esophageal varices.  Colonoscopy with polyps but no concerning lesions  plan  - Follow-up on the pathology on the polyp to determine timing for next colonoscopy  - Continue oral protonix  - Nadolol continuation  - continue rifaximin and lactulose by GI.  - Monitor hemoglobin.     Dyspnea possible CAP  Likely secondary to his ascites and upward pressure on the diaphragm given his symptoms  Patient states it has been present for weeks, improved after previous paracentesis  CXR concerning for pneumonia  Plan  - continue the ceftriaxone  - continues on 1 liter of O2     Alcohol dependence and alcohol withdrawal  Patient complains of being shaky since stopped drinking 1 week ago.  He was placed on CIWA protocol with Ativan as needed dosing per CIWA protocol.  Plan  - discussed with GI. More confused after the lorazepam than on admission. At this point, he is 1 week out from his last drink of alcohol. We will stop the lorazepam and continue the CIWA. If remains reassuring will stop CIWA in the AM. There is decent potential that the bzd is contributing to delirium  - Continue thiamine, folic acid, and multivitamin.  - Psychiatry consulted  - PT/OT    Depression/anxiety  - Continue PTA duloxetine and remeron.     Ascites  S/p ultrasound guided paracentesis on 8/2/19 with removal of 3.2 liters of fluid.  Cell count not  suggestive of SBP. Culture negative.  Completed course of rocephin 7 days for empiric coverage, but this was extended because of the potential pneumonia  Plan  - therapeutic paracentesis ordered  - discussed with GI. If BMP WNL on 8/10 could consider starting diuretics     Elevated LFTs  - Due to alcoholic liver disease.  - LFT's slowly trending down.    Itchy eyes  - Artificial tears help only mildly.  - ? Allergic conjunctivitis, continue PRN antihistamine eye drop.    Hypokalemia  Hypomagnesemia  Hypophosphatemia  - Replace and recheck per protocol.    DVT Prophylaxis: Pneumatic Compression Devices  Code Status: DNR/DNI  Expected discharge: likely to TCU on 8/11 at the earliest. Needs to be tolerating diet, and ideally on some sort of diuretic to help prevent ascitic fluid accumulation. Need      Mark Fan DO  Hospitalist [facility]  Pager: [phone]      Interval History   Some pain in his back that is chronic  Sob improved, some clear/white colored sputum  Feels more distended  No fever or chills.    -Data reviewed today: I reviewed all new labs and imaging results over the last 24 hours. I personally reviewed no images or EKG's today.    Physical Exam   Temp: 98.5  F (36.9  C) Temp src: Oral BP: (!) 146/74 Pulse: 65 Heart Rate: 67 Resp: 16 SpO2: 97 % O2 Device: Nasal cannula Oxygen Delivery: 2 LPM  Vitals:    08/07/19 0627 08/08/19 0625 08/09/19 0533   Weight: 85.2 kg (187 lb 13.3 oz) 85.3 kg (188 lb 0.8 oz) 91.9 kg (202 lb 9.6 oz)     Vital Signs with Ranges  Temp:  [97.2  F (36.2  C)-98.5  F (36.9  C)] 98.5  F (36.9  C)  Pulse:  [49-66] 65  Heart Rate:  [50-67] 67  Resp:  [10-35] 16  BP: ()/() 146/74  SpO2:  [91 %-98 %] 97 %  I/O last 3 completed shifts:  In: 3801 [P.O.:2776; I.V.:1025]  Out: 525 [Urine:525]    Constitutional: Alert, cooperative, no apparent distress, laying in bed  Respiratory: Clear to auscultation bilaterally, no crackles or wheezing  Cardiovascular: Regular rate and  rhythm, normal S1 and S2, and no murmur noted, 2+ edema  GI: Normal bowel sounds, soft, distended, generalized tenderness, no rebound tenderness or guarding  Skin/Integument: No rashes, no cyanosis  Neurologic: Alert, oriented to person, knew he is in the hospital. Again, thought it was 1999.    Medications     - MEDICATION INSTRUCTIONS -       - MEDICATION INSTRUCTIONS -         albumin human  12.5 g Intravenous BID     atorvastatin  10 mg Oral At Bedtime     cefTRIAXone  1 g Intravenous Q24H     DULoxetine  60 mg Oral Daily     fluticasone-vilanterol  1 puff Inhalation Daily     folic acid  1 mg Oral Daily     lactulose  10 g Oral BID     mirtazapine  30 mg Oral At Bedtime     multivitamin w/minerals  1 tablet Oral Daily     nadolol  40 mg Oral Daily     pantoprazole  40 mg Oral QAM AC     rifaximin  550 mg Oral BID     sodium chloride (PF)  3 mL Intracatheter Q8H     tamsulosin  0.4 mg Oral Daily     Data   Recent Labs   Lab 08/09/19  1047 08/09/19  0816 08/08/19  0720 08/07/19  0731 08/06/19  0739   WBC  --   --  4.6 4.8 5.2   HGB 9.3*  --  9.6* 10.2* 10.8*   MCV  --   --  91 92 91   PLT  --   --  186 184 191   INR  --   --   --   --  1.31*   NA  --  141 138 137 136   POTASSIUM  --  3.7 3.4 3.7 3.5   CHLORIDE  --  109 107 106 104   CO2  --  25 24 23 25   BUN  --  18 16 16 15   CR  --  1.02 0.96 0.87 0.88   ANIONGAP  --  7 7 8 7   KEV  --  8.0* 7.7* 7.7* 7.8*   GLC  --  93 79 76 89   ALBUMIN  --  1.9* 1.8*  --  1.9*   PROTTOTAL  --  5.7* 5.3*  --  5.5*   BILITOTAL  --  3.6* 3.8*  --  4.3*   ALKPHOS  --  274* 262*  --  290*   ALT  --  29 29  --  39   AST  --  51* 52*  --  86*     Recent Results (from the past 24 hour(s))   XR Chest 2 Views    Narrative    CHEST TWO VIEWS      8/8/2019 6:38 PM     HISTORY: Shortness of breath, cirrhosis.    COMPARISON: None.      Impression    IMPRESSION: AP film accentuates heart size which is likely upper  normal. Pulmonary vasculature does not appear distended.    New increased  opacity in the right lung base suspicious for pneumonia.  Mild left lung base atelectasis or infiltrate as well. On the lateral  view there is a small pleural effusion likely on the right.     TINO GARCIA MD

## 2019-08-09 NOTE — PLAN OF CARE
Discharge Planner OT   Patient plan for discharge: home  Current status: Pt waiting on another procedure but willing to participate.  Used logrolling technique to come to EOB with SBA.  Used walker to complete a standing pivot transfer to the commode.  Completed toilet hygiene with Min A.  Worked on trying to don briefs from the commode and from EOB.  Pt needed Mod A with this, would benefit from trying reacher for dressing.  Returned to bed at end of session SBA.  Barriers to return to prior living situation: Pt lives alone, decreased endurance for mobility and household chores, falls risk, cognition  Recommendations for discharge: TCU  Rationale for recommendations:   Patient is currently below IND baseline. Patient would benefit from ongoing OT to increase activity tolerance, cognition, strength, and IND with ADLs.   Entered by: Sebastien Mancera 08/09/2019 8:15 AM

## 2019-08-09 NOTE — PLAN OF CARE
DATE & TIME: 8/9/19 0430           Cognitive Concerns/ Orientation : A&O x4  BEHAVIOR & AGGRESSION TOOL COLOR: green   CIWA SCORE: 3/0 for tremors.  ABNL VS/O2: VSS on RA while awake, Fine crackles noted on right LL. 2L O2 instead of CPAP at night.   MOBILITY: Ax1 with GB/WK   PAIN MANAGMENT: Right rib cage and neck pain, fell prior to admission, PRN dilaudid IV given per request. Tylenol for fever only.   DIET: NPO since 0400.  BOWEL/BLADDER: INC of bowel. Leger in place. Dark tea color urine noted. Low urine output-- averaging 100-200 ml each shift.  MD aware, would consider albumin if worsens. Abd distended. Will have paracentesis in am.  ABNL LAB/BG: WBC 9.6, trending down from yesterday 10.2. CXR suggested right lung base PNA. Pt just finished 6 days of IV Ceftriaxone. bilirubin 3.8  DRAIN/DEVICES: PIV on right arm, infusing.   TELEMETRY RHYTHM: SB with R BBB  SKIN: bruises throughout, bottom excoriated, using miconazole powder PRN.   TESTS/PROCEDURES:Paracentesis in the AM  D/C DAY/GOALS/PLACE: 2-3 days pending progress.  OTHER IMPORTANT INFO:

## 2019-08-09 NOTE — PROGRESS NOTES
[facility]  Gastroenterology Progress Note     Jim Richard MRN# [id number]   YOB: 1955 Age: 64 year old          Assessment and Plan:     GI bleed- Patient has had slow decrease in hemoglobin to 9.6 ( no results for today) without active signs of GI bleed. EGD revealed esophagitis. Colonoscopy done yesterday and unremarkable for source of GI bleed. Will update chart with findings once available.  Tolerated clear liquids advance to full liquid diet post paracentesis. Daily hemoglobin.    Ascites-Has had complaints of abdominal distention. Planned paracentesis today. Currently NPO  Cirrhosis-LFTs stable. Continue to monitor cbc, cmp daily. Titrate lactulose for 1-2 bowel movements daily.        Gastrointestinal hemorrhage, unspecified gastrointestinal hemorrhage type      Interval History:   no new complaints, doing well, denies chest pain, denies shortness of breath and has had a bowel movement in the last 24 hours              Review of Systems:   C: NEGATIVE for fever, chills, change in weight  E/M: NEGATIVE for ear, mouth and throat problems  R: NEGATIVE for significant cough or SOB  CV: NEGATIVE for chest pain, palpitations or peripheral edema             Medications:   I have reviewed this patient's current medications    albumin human  12.5 g Intravenous BID     atorvastatin  10 mg Oral At Bedtime     cefTRIAXone  1 g Intravenous Q24H     DULoxetine  60 mg Oral Daily     fluticasone-vilanterol  1 puff Inhalation Daily     folic acid  1 mg Oral Daily     lactulose  10 g Oral BID     mirtazapine  30 mg Oral At Bedtime     multivitamin w/minerals  1 tablet Oral Daily     nadolol  40 mg Oral Daily     pantoprazole  40 mg Oral QAM AC     rifaximin  550 mg Oral BID     sodium chloride (PF)  3 mL Intracatheter Q8H     tamsulosin  0.4 mg Oral Daily                  Physical Exam:   Vitals were reviewed  Vital Signs with Ranges  Temp:  [97.2  F (36.2  C)-98.6  F (37  C)] 98.5  F  (36.9  C)  Pulse:  [49-61] 54  Heart Rate:  [50-67] 67  Resp:  [10-35] 16  BP: ()/() 121/67  SpO2:  [91 %-98 %] 95 %  I/O last 3 completed shifts:  In: 3801 [P.O.:2776; I.V.:1025]  Out: 525 [Urine:525]  Constitutional: healthy, alert and no distress   Cardiovascular: negative, PMI normal. No lifts, heaves, or thrills. RRR. No murmurs, clicks gallops or rub  Respiratory: negative, Percussion normal. Good diaphragmatic excursion. Lungs clear  Head: Normocephalic. No masses, lesions, tenderness or abnormalities  Neck: Neck supple. No adenopathy. Thyroid symmetric, normal size,, Carotids without bruits.  Abdomen: Abdomen soft, tender. BS normal. No masses, organomegaly, positive findings: tenderness moderate generalized, distended           Data:   I reviewed the patient's new clinical lab test results.   Recent Labs   Lab Test 08/08/19  0720 08/07/19  0731 08/06/19  0739  08/01/19  1629  09/29/18  0550   WBC 4.6 4.8 5.2   < > 3.8*   < > 6.1   HGB 9.6* 10.2* 10.8*   < > 8.7*   < > 10.1*   MCV 91 92 91   < > 95   < > 74*    184 191   < > 155   < > 207   INR  --   --  1.31*  --  1.18*  --  1.10    < > = values in this interval not displayed.     Recent Labs   Lab Test 08/09/19  0816 08/08/19  0720 08/07/19  0731   POTASSIUM 3.7 3.4 3.7   CHLORIDE 109 107 106   CO2 25 24 23   BUN 18 16 16   ANIONGAP 7 7 8     Recent Labs   Lab Test 08/09/19  0816 08/08/19  0720 08/06/19  0739  02/13/19  2340  09/29/18  0550  03/21/18  1555  09/25/14  0510  06/12/12  0550   ALBUMIN 1.9* 1.8* 1.9*   < > 3.1*   < > 3.2*   < > 2.9*   < >  --    < >  --    BILITOTAL 3.6* 3.8* 4.3*   < > 0.4   < > 1.0   < > 0.9   < >  --    < >  --    ALT 29 29 39   < > 41   < > 60   < > 74*   < >  --    < >  --    AST 51* 52* 86*   < > 89*   < > 140*   < > 136*   < >  --    < >  --    PROTEIN  --   --   --   --   --   --   --   --   --   --  Negative  --  Negative   LIPASE  --   --   --   --  536*  --  585*  --  316  --   --   --   --     < >  = values in this interval not displayed.       I reviewed the patient's new imaging results.    All laboratory data reviewed  All imaging studies reviewed by me.    Gloria De Leon PA-C,  8/9/2019  [facility]  Office : [phone]  Cell: [phone] (Dr. Shaw)  Cell: [phone] (Gloria De Leon PA-C)

## 2019-08-09 NOTE — PROGRESS NOTES
1400 Time Out done.    1403 Procedure started    Paracentesis: Pt tolerated well. VSS. 3600 cc Dark yellow fluid removed from abdomen w/o difficulty.  Right lower abdomen. Site - CDI.    1420 Procedure completed    1426 Pt back to rm [id number] per cart & transport.

## 2019-08-09 NOTE — PLAN OF CARE
DATE & TIME: 8/9/2019 AM shift  Cognitive Concerns/ Orientation : A&O x4, forgetful  BEHAVIOR & AGGRESSION TOOL COLOR: green   CIWA SCORE: 0/0, some lulú UE tremor present with activity.   ABNL VS/O2: VSS on RA this morning but later this afternoon back to 1L of oxygen via NC, O2 sat was in mid 80% and pt was feeling SOB. LS diminished.  MOBILITY: Ax1 with GB/WK, ambulated in the muniz with therapy.  PAIN MANAGMENT: Pt complains of R lower back pain, Norco x 1 given with poor relief. Cold applied. Encouraged repositioning more often.   DIET:Tolerating regular diet. Denies nausea.  BOWEL/BLADDER: INC of bowel. One large soft/loose BM this shift. Leger in place. Dark tea color urine noted. Low urine output-- averaging 100-200 ml each shift.Albumin x 1 given. Abd distended. Plan for Paracentesis this afternoon.   ABNL LAB/BG:Bilirubin 3.6. Alk phosphat 274. AST 51. Hg stable at 9.3 today.  DRAIN/DEVICES: PIV on right arm, SL  TELEMETRY RHYTHM: SB with BBB  SKIN: bruises throughout, bottom excoriated, using miconazole powder PRN.   TESTS/PROCEDURES: paracentesis   D/C DAY/GOALS/PLACE:Discharge pending. GI is following.   OTHER IMPORTANT INFO: PT/OT are following, recommending TCU

## 2019-08-09 NOTE — PLAN OF CARE
Discharge Planner PT   Patient plan for discharge: not stated  Current status: Pt performed bed mobility with min assist.  Sat EOB with SBA while getting lab draw.  Tremors noted.  Sit to/from stand transfers with CGA and cues for safety.  Gait training x 120 ft using wheeled walker and CGA.  Amb with wide [place] and mild unsteadiness noted but no significant LOB.  Mild SOB and fatigue noted at end of gait.   Barriers to return to prior living situation: level of assist; falls risk; decreased activity tolerance  Recommendations for discharge: TCU per plan established by the PT.   Rationale for recommendations: Patient would benefit from ongoing skilled PT to address weakness, decreased activity tolerance, impaired balance, and impaired functional mobility; Would need to be min A or less for all mobility if he were to return directly home with 24/7 assist of significant other.       Entered by: Adriana Unger 08/09/2019 8:34 AM

## 2019-08-10 LAB
ALBUMIN SERPL-MCNC: 1.9 G/DL (ref 3.4–5)
ALP SERPL-CCNC: 251 U/L (ref 40–150)
ALT SERPL W P-5'-P-CCNC: 26 U/L (ref 0–70)
ANION GAP SERPL CALCULATED.3IONS-SCNC: 4 MMOL/L (ref 3–14)
AST SERPL W P-5'-P-CCNC: 42 U/L (ref 0–45)
BILIRUB SERPL-MCNC: 2.8 MG/DL (ref 0.2–1.3)
BUN SERPL-MCNC: 17 MG/DL (ref 7–30)
CALCIUM SERPL-MCNC: 7.8 MG/DL (ref 8.5–10.1)
CHLORIDE SERPL-SCNC: 108 MMOL/L (ref 94–109)
CO2 SERPL-SCNC: 25 MMOL/L (ref 20–32)
CREAT SERPL-MCNC: 0.89 MG/DL (ref 0.66–1.25)
GFR SERPL CREATININE-BSD FRML MDRD: 90 ML/MIN/{1.73_M2}
GLUCOSE SERPL-MCNC: 116 MG/DL (ref 70–99)
HGB BLD-MCNC: 9.7 G/DL (ref 13.3–17.7)
POTASSIUM SERPL-SCNC: 3.5 MMOL/L (ref 3.4–5.3)
PROT SERPL-MCNC: 5.5 G/DL (ref 6.8–8.8)
SODIUM SERPL-SCNC: 137 MMOL/L (ref 133–144)

## 2019-08-10 PROCEDURE — 25000128 H RX IP 250 OP 636: Performed by: INTERNAL MEDICINE

## 2019-08-10 PROCEDURE — 80053 COMPREHEN METABOLIC PANEL: CPT | Performed by: HOSPITALIST

## 2019-08-10 PROCEDURE — 85018 HEMOGLOBIN: CPT | Performed by: PHYSICIAN ASSISTANT

## 2019-08-10 PROCEDURE — 25000128 H RX IP 250 OP 636: Performed by: HOSPITALIST

## 2019-08-10 PROCEDURE — 25000132 ZZH RX MED GY IP 250 OP 250 PS 637: Mod: GY | Performed by: HOSPITALIST

## 2019-08-10 PROCEDURE — 12000000 ZZH R&B MED SURG/OB

## 2019-08-10 PROCEDURE — 25000132 ZZH RX MED GY IP 250 OP 250 PS 637: Mod: GY | Performed by: INTERNAL MEDICINE

## 2019-08-10 PROCEDURE — 36415 COLL VENOUS BLD VENIPUNCTURE: CPT | Performed by: HOSPITALIST

## 2019-08-10 PROCEDURE — P9047 ALBUMIN (HUMAN), 25%, 50ML: HCPCS | Performed by: INTERNAL MEDICINE

## 2019-08-10 PROCEDURE — 99233 SBSQ HOSP IP/OBS HIGH 50: CPT | Performed by: INTERNAL MEDICINE

## 2019-08-10 PROCEDURE — 36415 COLL VENOUS BLD VENIPUNCTURE: CPT | Performed by: PHYSICIAN ASSISTANT

## 2019-08-10 PROCEDURE — 25000132 ZZH RX MED GY IP 250 OP 250 PS 637: Mod: GY | Performed by: PHYSICIAN ASSISTANT

## 2019-08-10 PROCEDURE — P9047 ALBUMIN (HUMAN), 25%, 50ML: HCPCS | Performed by: HOSPITALIST

## 2019-08-10 PROCEDURE — 25000131 ZZH RX MED GY IP 250 OP 636 PS 637: Mod: GY | Performed by: INTERNAL MEDICINE

## 2019-08-10 RX ORDER — SPIRONOLACTONE 25 MG/1
25 TABLET ORAL DAILY
Status: DISCONTINUED | OUTPATIENT
Start: 2019-08-10 | End: 2019-08-14 | Stop reason: HOSPADM

## 2019-08-10 RX ORDER — FUROSEMIDE 20 MG
20 TABLET ORAL DAILY
Status: DISCONTINUED | OUTPATIENT
Start: 2019-08-10 | End: 2019-08-10

## 2019-08-10 RX ORDER — ALBUMIN (HUMAN) 12.5 G/50ML
25 SOLUTION INTRAVENOUS EVERY 12 HOURS
Status: DISCONTINUED | OUTPATIENT
Start: 2019-08-10 | End: 2019-08-12

## 2019-08-10 RX ADMIN — TRAZODONE HYDROCHLORIDE 150 MG: 100 TABLET ORAL at 21:21

## 2019-08-10 RX ADMIN — NADOLOL 40 MG: 40 TABLET ORAL at 09:04

## 2019-08-10 RX ADMIN — SPIRONOLACTONE 25 MG: 25 TABLET ORAL at 13:52

## 2019-08-10 RX ADMIN — MICONAZOLE NITRATE: 20 POWDER TOPICAL at 06:50

## 2019-08-10 RX ADMIN — MICONAZOLE NITRATE: 20 POWDER TOPICAL at 14:01

## 2019-08-10 RX ADMIN — HYDROCODONE BITARTRATE AND ACETAMINOPHEN 1 TABLET: 5; 325 TABLET ORAL at 10:18

## 2019-08-10 RX ADMIN — ONDANSETRON 4 MG: 4 TABLET, ORALLY DISINTEGRATING ORAL at 13:58

## 2019-08-10 RX ADMIN — ALBUMIN HUMAN 12.5 G: 0.25 SOLUTION INTRAVENOUS at 10:17

## 2019-08-10 RX ADMIN — MIRTAZAPINE 30 MG: 30 TABLET, FILM COATED ORAL at 21:21

## 2019-08-10 RX ADMIN — FOLIC ACID 1 MG: 1 TABLET ORAL at 09:04

## 2019-08-10 RX ADMIN — SODIUM CHLORIDE 1000 ML: 9 INJECTION, SOLUTION INTRAVENOUS at 13:53

## 2019-08-10 RX ADMIN — FLUTICASONE FUROATE AND VILANTEROL TRIFENATATE 1 PUFF: 200; 25 POWDER RESPIRATORY (INHALATION) at 09:03

## 2019-08-10 RX ADMIN — ALBUMIN HUMAN 25 G: 0.25 SOLUTION INTRAVENOUS at 16:01

## 2019-08-10 RX ADMIN — HYDROCODONE BITARTRATE AND ACETAMINOPHEN 1 TABLET: 5; 325 TABLET ORAL at 02:12

## 2019-08-10 RX ADMIN — ATORVASTATIN CALCIUM 10 MG: 10 TABLET, FILM COATED ORAL at 21:21

## 2019-08-10 RX ADMIN — ONDANSETRON 4 MG: 4 TABLET, ORALLY DISINTEGRATING ORAL at 21:27

## 2019-08-10 RX ADMIN — LACTULOSE 10 G: 10 SOLUTION ORAL at 09:05

## 2019-08-10 RX ADMIN — CEFTRIAXONE SODIUM 1 G: 1 INJECTION, POWDER, FOR SOLUTION INTRAMUSCULAR; INTRAVENOUS at 09:03

## 2019-08-10 RX ADMIN — DULOXETINE HYDROCHLORIDE 60 MG: 60 CAPSULE, DELAYED RELEASE ORAL at 09:04

## 2019-08-10 RX ADMIN — HYDROCODONE BITARTRATE AND ACETAMINOPHEN 1 TABLET: 5; 325 TABLET ORAL at 21:21

## 2019-08-10 RX ADMIN — PANTOPRAZOLE SODIUM 40 MG: 40 TABLET, DELAYED RELEASE ORAL at 06:51

## 2019-08-10 RX ADMIN — MULTIPLE VITAMINS W/ MINERALS TAB 1 TABLET: TAB at 09:04

## 2019-08-10 RX ADMIN — TAMSULOSIN HYDROCHLORIDE 0.4 MG: 0.4 CAPSULE ORAL at 09:04

## 2019-08-10 RX ADMIN — RIFAXIMIN 550 MG: 550 TABLET ORAL at 09:04

## 2019-08-10 ASSESSMENT — ACTIVITIES OF DAILY LIVING (ADL)
ADLS_ACUITY_SCORE: 14

## 2019-08-10 NOTE — PLAN OF CARE
DATE & TIME: 8/10/2019 AM shift  Cognitive Concerns/ Orientation : A&O x4, forgetful  BEHAVIOR & AGGRESSION TOOL COLOR: green   CIWA SCORE: 0/0, some lulú UE tremor present with activity.   ABNL VS/O2: VSS on 2L of oxygen via NC, more bradycardic this afternoon, HR 48-52.  Pt reports feeling SOB more today. Chest xray ordered. LS diminished.  MOBILITY: Ax1 with GB/WK, ambulated in the muniz x 1, short walk, pt c/o of SOB/NICOLE.  PAIN MANAGMENT: Pt complains of R lower back pain, Norco x 1 with relief. Encouraged repositioning more often.   DIET:Tolerating regular diet. Denies nausea.  BOWEL/BLADDER:incontinent of bowel.Multiple loose/soft BMs this shift. Lactulose stopped. Leger in place. Dark tea color urine noted. Low urine output-- averaging 100-200 ml each shift.Albumin x 1 given. Started IV bolus. I/O strict. Order to give IVF bolus if urine output less than 25ml q1hr.  Abd distended again. POD 1 after Paracentesis .  ABNL LAB/BG:Bilirubin 2.8. Alk phosphat 251. Hg stable at 9.7 today.  DRAIN/DEVICES: PIV on right arm, SL  TELEMETRY RHYTHM: SB with BBB  SKIN: bruises throughout, bottom excoriated, using miconazole powder PRN.   TESTS/PROCEDURES:   D/C DAY/GOALS/PLACE:Discharge pending. GI is following.   OTHER IMPORTANT INFO: PT/OT are following, recommending TCU

## 2019-08-10 NOTE — PROGRESS NOTES
[facility] Internal Medicine Progress Note     Date of Service (when I saw the patient): 08/10/2019    REASON FOR ADMISSION / INTERVAL HISTORY:  Presented with dark colored stools 8/1. Has history of alcohol dependence, alcoholic liver disease, depression and anxiety, history of GI bleed secondary to esophageal varices. Has had minimal UO last 3-4 days. See details below    ASSESSMENT/PLAN:   MELENA/ UGI BLEED  Presented 8/1 with h/o dark stools, hg 8.7. Baseline around 10. S/p EGD 8/2-LA Grade C esophagitis, likely the cause of melena. No active bleeding seen. S/p colonoscopy 8/8-3mm polyp [id number]. No active bleeding. Hg has remained stable at 9.7.  -continue protonix/ nadolol. Would discontinue nadolol after discussing with GI    CIRRHOSIS WITH ASCITIS  S/p ultrasound guided paracentesis on 8/2/19 with removal of 3.2 liters of fluid. Repeat paracentesis 8/9-3.6 L fluid removed.   Pt with poor UO last 3-4 days-only 4-500cc/24 hrs. Diuretics were started by GI today. But after discussion, would not start lasix yet. Continue spironolactone. Stop lactulose/ rifaximin for low UO and diarrhea. Monitor abdominal girth daily.    LOW UO  Last 3-4 days. Stop diuretics, lactulose/ rifaximin. Start scheduled iv albumin bid. Start iv fluid boluses for UO <25cc/hr q 2 hrs. Give fluid bolus.  Re-eval in AM    B PULM INFILTRATES  Pt hypoxic needing 2 L o2. CXR 8/8 showed B pulm infiltrates. Has no fever/ leucocytosis. Was on rocephin since admit. ?ARDS/ pneumonia.  -check PCT. Stop antbx if PCT negative. Continue o2    ETOH DEPENDENCE/ ABUSE  Apparently more confused with ativan recently than was without. Not withdrawing-1 week out  -stop CIWA    DEPRESSION/ ANXIETY  Continue meds    DISPO  Will be in hospital 1-2 days.    SMITH YUNG MD   Pg [phone]    DVT Prhylaxis: Pneumatic Compression Devices  Code Status: DNR/DNI    ROS:  As described in A/P and Exam.  Otherwise ALL are  negative.    PHYSICAL EXAM:  All  vitals have been reviewed    Blood pressure 132/76, pulse 62, temperature 98  F (36.7  C), temperature source Axillary, resp. rate 16, weight 89.4 kg (197 lb 1.5 oz), SpO2 97 %.    I/O this shift:  In: 240 [P.O.:240]  Out: 200 [Urine:200]    GENERAL APPEARANCE: healthy, alert and no distress  EYES: conjunctiva clear, eyes grossly normal  HENT: external ears and nose normal   NECK: supple, no masses or adenopathy  RESP: lungs decreased BS bases - no rales, rhonchi or wheezes  CV: regular rate and rhythm, normal S1 S2, no S3 or S4 and no murmur, click or rub   ABDOMEN: soft,distended, nontender, no HSM or masses and bowel sounds normal  MS: no clubbing, cyanosis; no edema  SKIN: clear without significant rashes or lesions  NEURO: -non-focal moves all 4 extr    ROUTINE  LABS (Last four results)  CMP  Recent Labs   Lab 08/10/19  0858 08/09/19  0816 08/08/19  0720 08/07/19  1925 08/07/19  1408 08/07/19  0731 08/06/19  0739 08/05/19  0615  08/04/19  0526    141 138  --   --  137 136 134  --  132*   POTASSIUM 3.5 3.7 3.4  --   --  3.7 3.5 3.5  --  4.1   CHLORIDE 108 109 107  --   --  106 104 100  --  98   CO2 25 25 24  --   --  23 25 28  --  29   ANIONGAP 4 7 7  --   --  8 7 6  --  5   * 93 79  --   --  76 89 92  --  107*   BUN 17 18 16  --   --  16 15 14  --  18   CR 0.89 1.02 0.96  --   --  0.87 0.88 0.86  --  1.08   GFRESTIMATED 90 77 82  --   --  >90 90 >90  --  71   GFRESTBLACK >90 89 >90  --   --  >90 >90 >90  --  83   KEV 7.8* 8.0* 7.7*  --   --  7.7* 7.8* 8.0*  --  8.2*   MAG  --   --  1.9 2.3 1.4* 1.4*  --  1.9   < > 1.5*   PHOS  --   --   --   --   --  3.2  --  2.8  --  2.7   PROTTOTAL 5.5* 5.7* 5.3*  --   --   --  5.5* 5.7*   < >  --    ALBUMIN 1.9* 1.9* 1.8*  --   --   --  1.9* 2.0*   < >  --    BILITOTAL 2.8* 3.6* 3.8*  --   --   --  4.3* 4.6*   < >  --    ALKPHOS 251* 274* 262*  --   --   --  290* 276*   < >  --    AST 42 51* 52*  --   --   --  86* 99*   < >  --    ALT 26 29 29  --   --   --  39  46   < >  --     < > = values in this interval not displayed.     CBC  Recent Labs   Lab 08/10/19  0858 08/09/19  1047 08/08/19  0720 08/07/19  0731 08/06/19  0739 08/05/19  0615   WBC  --   --  4.6 4.8 5.2 6.0   RBC  --   --  3.14* 3.34* 3.54* 3.69*   HGB 9.7* 9.3* 9.6* 10.2* 10.8* 11.0*   HCT  --   --  28.5* 30.6* 32.2* 33.5*   MCV  --   --  91 92 91 91   MCH  --   --  30.6 30.5 30.5 29.8   MCHC  --   --  33.7 33.3 33.5 32.8   RDW  --   --  18.2* 18.2* 18.1* 17.8*   PLT  --   --  186 184 191 171     INR  Recent Labs   Lab 08/06/19  0739   INR 1.31*     Arterial Blood GasNo lab results found in last 7 days.    Recent Results (from the past 24 hour(s))   US Paracentesis    Narrative    EXAM: US PARACENTESIS  8/9/2019 2:28 PM       HISTORY:HIGH VOLUME paracentesis with or without diagnostic fluid  analysis with labs to be drawn if ordered. Total paracentesis volume  as much as possible. Ascites.      PROCEDURE:  Written informed consent was obtained from the patient  prior to the procedure. The risks and benefits including bleeding,  infection and abdominal organ injury were discussed and the patient  wished to continue. Initial ultrasound images demonstrated a large  right lower quadrant fluid collection. The skin overlying this  collection was marked, prepped, draped and anesthetized in usual  sterile fashion utilizing 10 mL 1% lidocaine. The paracentesis  catheter was then placed into the peritoneal fluid collection with  return of 3600 mL mL of straw-colored fluid. Patient tolerated the  procedure well. The patient will receive intravenous albumin on the  usual sliding scale as needed per protocol.      US guidance was utilized to enter the peritoneal space for  paracentesis with permanent image recoding.      Impression    IMPRESSION:  Ultrasound-guided paracentesis.      PORSHA BOB PA-C

## 2019-08-10 NOTE — PLAN OF CARE
"OT attempted.  Patient feeling nauseas, nursing just getting meds to assist.  Patient declined therapy this afternoon, asked therapist to return tomorrow \"I'll be here\".  "

## 2019-08-10 NOTE — PLAN OF CARE
DATE & TIME: 8/10/19 9354-7298  Cognitive Concerns/ Orientation : A&Ox4, forgetful  BEHAVIOR & AGGRESSION TOOL COLOR: Green   CIWA SCORE: 1 (for nausea), 0  ABNL VS/O2: VSS on RA  MOBILITY: Ax1 with GB & walker  PAIN MANAGMENT: Norco x1 for abdominal pain  DIET: Regular  BOWEL/BLADDER: Leger, one large BM this shift.   ABNL LAB/BG: Hgb 9.3  DRAIN/DEVICES: PIV SL  TELEMETRY RHYTHM: Sinus mandi with BBB  SKIN: Bruising, bottom excoriated, using miconazole powder PRN.   TESTS/PROCEDURES: Paracentesis yesterday   D/C DAY/GOALS/PLACE:Discharge to TCU pending progress  OTHER IMPORTANT INFO: PT/OT/GI following. Skin jaundiced.

## 2019-08-10 NOTE — PROGRESS NOTES
[facility]  Gastroenterology Progress Note     Jim Richard MRN# [id number]   YOB: 1955 Age: 64 year old          Assessment and Plan:     GI bleed- EGD showed esophagitis, no significant esophageal varices.  Colonoscopy with polyps but no concerning lesions. Hemoglobin stable.   Ascites- Status post ultrasound guided paracentesis removed 3600 mL of fluid. Has helped abdominal discomfort however has had recurrent development of ascites since yesterday. BMP essentially normal and would benefit from diuretics. Will start on Lasix and spironolactone. Will repeat CMP in a.m. Monitor potassium closely. Will recommend patient have repeat ultrasound guided paracentesis tomorrow if still having complaints of abdominal distention from ascites   Cirrhosis- Patient has no evidence of confusion and oriented.  Continue with rifaximin and lactulose by GI. Continue with thiamine, folic acid and multivitamin.LFTs trending down.          Gastrointestinal hemorrhage, unspecified gastrointestinal hemorrhage type  Other ascites      Interval History:   no new complaints, denies chest pain, denies shortness of breath, denies abdominal pain, alert, oriented to person, place and time, has had a bowel movement in the last 24 hours and doing well; no cp, sob, n/v/d, or abd pain.              Review of Systems:   C: NEGATIVE for fever, chills, change in weight  E/M: NEGATIVE for ear, mouth and throat problems  R: NEGATIVE for significant cough or SOB  CV: NEGATIVE for chest pain, palpitations or peripheral edema             Medications:   I have reviewed this patient's current medications    atorvastatin  10 mg Oral At Bedtime     cefTRIAXone  1 g Intravenous Q24H     DULoxetine  60 mg Oral Daily     fluticasone-vilanterol  1 puff Inhalation Daily     folic acid  1 mg Oral Daily     lactulose  10 g Oral BID     mirtazapine  30 mg Oral At Bedtime     multivitamin w/minerals  1 tablet Oral Daily     nadolol   40 mg Oral Daily     pantoprazole  40 mg Oral QAM AC     rifaximin  550 mg Oral BID     sodium chloride (PF)  3 mL Intracatheter Q8H     tamsulosin  0.4 mg Oral Daily                  Physical Exam:   Vitals were reviewed  Vital Signs with Ranges  Temp:  [98  F (36.7  C)-98.3  F (36.8  C)] 98  F (36.7  C)  Pulse:  [60-66] 62  Heart Rate:  [56-61] 60  Resp:  [16] 16  BP: (125-157)/(67-83) 146/70  SpO2:  [93 %-99 %] 97 %  I/O last 3 completed shifts:  In: 720 [P.O.:720]  Out: 575 [Urine:575]  Constitutional: healthy, alert and no distress   Cardiovascular: negative, PMI normal. No lifts, heaves, or thrills. RRR. No murmurs, clicks gallops or rub  Respiratory: negative, Percussion normal. Good diaphragmatic excursion. Lungs clear  Head: Normocephalic. No masses, lesions, tenderness or abnormalities  Neck: Neck supple. No adenopathy. Thyroid symmetric, normal size,, Carotids without bruits.  Abdomen: Abdomen soft, non-tender. BS normal. No masses, organomegaly, positive findings: distended           Data:   I reviewed the patient's new clinical lab test results.   Recent Labs   Lab Test 08/10/19  0858 08/09/19  1047 08/08/19  0720 08/07/19  0731 08/06/19  0739  08/01/19  1629  09/29/18  0550   WBC  --   --  4.6 4.8 5.2   < > 3.8*   < > 6.1   HGB 9.7* 9.3* 9.6* 10.2* 10.8*   < > 8.7*   < > 10.1*   MCV  --   --  91 92 91   < > 95   < > 74*   PLT  --   --  186 184 191   < > 155   < > 207   INR  --   --   --   --  1.31*  --  1.18*  --  1.10    < > = values in this interval not displayed.     Recent Labs   Lab Test 08/10/19  0858 08/09/19  0816 08/08/19  0720   POTASSIUM 3.5 3.7 3.4   CHLORIDE 108 109 107   CO2 25 25 24   BUN 17 18 16   ANIONGAP 4 7 7     Recent Labs   Lab Test 08/10/19  0858 08/09/19  0816 08/08/19  0720  02/13/19  2340  09/29/18  0550  03/21/18  1555  09/25/14  0510  06/12/12  0550   ALBUMIN 1.9* 1.9* 1.8*   < > 3.1*   < > 3.2*   < > 2.9*   < >  --    < >  --    BILITOTAL 2.8* 3.6* 3.8*   < > 0.4   < >  1.0   < > 0.9   < >  --    < >  --    ALT 26 29 29   < > 41   < > 60   < > 74*   < >  --    < >  --    AST 42 51* 52*   < > 89*   < > 140*   < > 136*   < >  --    < >  --    PROTEIN  --   --   --   --   --   --   --   --   --   --  Negative  --  Negative   LIPASE  --   --   --   --  536*  --  585*  --  316  --   --   --   --     < > = values in this interval not displayed.       I reviewed the patient's new imaging results.    All laboratory data reviewed  All imaging studies reviewed by me.    Gloria De Leon PA-C,  8/10/2019  [facility]  Office : [phone]  Cell: [phone] (Dr. Shaw)  Cell: [phone] (Gloria De Leon PA-C)

## 2019-08-10 NOTE — PLAN OF CARE
DATE & TIME: 8/9/2019 [place]  Cognitive Concerns/ Orientation : A&O x4, forgetful  BEHAVIOR & AGGRESSION TOOL COLOR: green, calm/coop   CIWA SCORE: 1/0   ABNL VS/O2: VSS on RA, afibrile  MOBILITY: Ax1 with GB/WK  PAIN MANAGMENT: c/o abd pain-medicated with Norco#1bwith good relief  DIET:Tolerating regular diet, good po intake  BOWEL/BLADDER:  Leger patent, incontinent of stool #1  ABNL LAB/BG: Bilirubin 3.6, Hgb stable at 9.3 today, AST-51  DRAIN/DEVICES: PIV SL  TELEMETRY RHYTHM: SB with BBB  SKIN: bruises throughout, bottom excoriated, using miconazole powder PRN.   TESTS/PROCEDURES: s/p paracentesis today-Rt abd site D/I  D/C DAY/GOALS/PLACE:Discharge to TCU pending progress/placement  OTHER IMPORTANT INFO: PT/OT/GI are following, abd distended/firm, started on Albumin IV today, skin jaundiced, continues on Rocephin, Trazodone given at hs.

## 2019-08-11 ENCOUNTER — APPOINTMENT (OUTPATIENT)
Dept: GENERAL RADIOLOGY | Facility: CLINIC | Age: 64
DRG: 369 | End: 2019-08-11
Attending: INTERNAL MEDICINE
Payer: MEDICARE

## 2019-08-11 ENCOUNTER — APPOINTMENT (OUTPATIENT)
Dept: PHYSICAL THERAPY | Facility: CLINIC | Age: 64
DRG: 369 | End: 2019-08-11
Payer: MEDICARE

## 2019-08-11 LAB
ALBUMIN SERPL-MCNC: 2.7 G/DL (ref 3.4–5)
ALP SERPL-CCNC: 232 U/L (ref 40–150)
ALT SERPL W P-5'-P-CCNC: 24 U/L (ref 0–70)
ANION GAP SERPL CALCULATED.3IONS-SCNC: 5 MMOL/L (ref 3–14)
AST SERPL W P-5'-P-CCNC: 36 U/L (ref 0–45)
BILIRUB SERPL-MCNC: 2.4 MG/DL (ref 0.2–1.3)
BUN SERPL-MCNC: 13 MG/DL (ref 7–30)
CALCIUM SERPL-MCNC: 8 MG/DL (ref 8.5–10.1)
CHLORIDE SERPL-SCNC: 109 MMOL/L (ref 94–109)
CO2 SERPL-SCNC: 24 MMOL/L (ref 20–32)
CREAT SERPL-MCNC: 0.84 MG/DL (ref 0.66–1.25)
ERYTHROCYTE [DISTWIDTH] IN BLOOD BY AUTOMATED COUNT: 17.9 % (ref 10–15)
GFR SERPL CREATININE-BSD FRML MDRD: >90 ML/MIN/{1.73_M2}
GLUCOSE SERPL-MCNC: 102 MG/DL (ref 70–99)
HCT VFR BLD AUTO: 29.5 % (ref 40–53)
HGB BLD-MCNC: 9.8 G/DL (ref 13.3–17.7)
MCH RBC QN AUTO: 30.4 PG (ref 26.5–33)
MCHC RBC AUTO-ENTMCNC: 33.2 G/DL (ref 31.5–36.5)
MCV RBC AUTO: 92 FL (ref 78–100)
NT-PROBNP SERPL-MCNC: 2831 PG/ML (ref 0–900)
PLATELET # BLD AUTO: 176 10E9/L (ref 150–450)
POTASSIUM SERPL-SCNC: 3.5 MMOL/L (ref 3.4–5.3)
PROCALCITONIN SERPL-MCNC: 0.15 NG/ML
PROT SERPL-MCNC: 5.9 G/DL (ref 6.8–8.8)
RBC # BLD AUTO: 3.22 10E12/L (ref 4.4–5.9)
SODIUM SERPL-SCNC: 138 MMOL/L (ref 133–144)
WBC # BLD AUTO: 4.3 10E9/L (ref 4–11)

## 2019-08-11 PROCEDURE — 25000132 ZZH RX MED GY IP 250 OP 250 PS 637: Mod: GY | Performed by: HOSPITALIST

## 2019-08-11 PROCEDURE — 99233 SBSQ HOSP IP/OBS HIGH 50: CPT | Performed by: INTERNAL MEDICINE

## 2019-08-11 PROCEDURE — P9047 ALBUMIN (HUMAN), 25%, 50ML: HCPCS | Performed by: INTERNAL MEDICINE

## 2019-08-11 PROCEDURE — 84145 PROCALCITONIN (PCT): CPT | Performed by: PHYSICIAN ASSISTANT

## 2019-08-11 PROCEDURE — 25000132 ZZH RX MED GY IP 250 OP 250 PS 637: Mod: GY | Performed by: INTERNAL MEDICINE

## 2019-08-11 PROCEDURE — 25000132 ZZH RX MED GY IP 250 OP 250 PS 637: Mod: GY | Performed by: PHYSICIAN ASSISTANT

## 2019-08-11 PROCEDURE — 25000128 H RX IP 250 OP 636: Performed by: INTERNAL MEDICINE

## 2019-08-11 PROCEDURE — 36415 COLL VENOUS BLD VENIPUNCTURE: CPT | Performed by: PHYSICIAN ASSISTANT

## 2019-08-11 PROCEDURE — 85027 COMPLETE CBC AUTOMATED: CPT | Performed by: PHYSICIAN ASSISTANT

## 2019-08-11 PROCEDURE — 97530 THERAPEUTIC ACTIVITIES: CPT | Mod: GP

## 2019-08-11 PROCEDURE — 80053 COMPREHEN METABOLIC PANEL: CPT | Performed by: PHYSICIAN ASSISTANT

## 2019-08-11 PROCEDURE — 83880 ASSAY OF NATRIURETIC PEPTIDE: CPT | Performed by: PHYSICIAN ASSISTANT

## 2019-08-11 PROCEDURE — 12000000 ZZH R&B MED SURG/OB

## 2019-08-11 PROCEDURE — 97116 GAIT TRAINING THERAPY: CPT | Mod: GP

## 2019-08-11 PROCEDURE — 25000128 H RX IP 250 OP 636: Performed by: HOSPITALIST

## 2019-08-11 PROCEDURE — 71045 X-RAY EXAM CHEST 1 VIEW: CPT

## 2019-08-11 RX ORDER — FUROSEMIDE 10 MG/ML
40 INJECTION INTRAMUSCULAR; INTRAVENOUS
Status: DISCONTINUED | OUTPATIENT
Start: 2019-08-11 | End: 2019-08-14 | Stop reason: HOSPADM

## 2019-08-11 RX ORDER — NICOTINE 21 MG/24HR
1 PATCH, TRANSDERMAL 24 HOURS TRANSDERMAL DAILY
Status: DISCONTINUED | OUTPATIENT
Start: 2019-08-11 | End: 2019-08-14 | Stop reason: HOSPADM

## 2019-08-11 RX ADMIN — DULOXETINE HYDROCHLORIDE 60 MG: 60 CAPSULE, DELAYED RELEASE ORAL at 09:31

## 2019-08-11 RX ADMIN — NICOTINE 1 PATCH: 21 PATCH, EXTENDED RELEASE TRANSDERMAL at 17:42

## 2019-08-11 RX ADMIN — MULTIPLE VITAMINS W/ MINERALS TAB 1 TABLET: TAB at 09:31

## 2019-08-11 RX ADMIN — FUROSEMIDE 40 MG: 10 INJECTION, SOLUTION INTRAVENOUS at 12:32

## 2019-08-11 RX ADMIN — DEXTRAN 70, AND HYPROMELLOSE 2910 2 DROP: 1; 3 SOLUTION/ DROPS OPHTHALMIC at 14:10

## 2019-08-11 RX ADMIN — ALBUMIN HUMAN 25 G: 0.25 SOLUTION INTRAVENOUS at 03:53

## 2019-08-11 RX ADMIN — TRAZODONE HYDROCHLORIDE 150 MG: 100 TABLET ORAL at 21:25

## 2019-08-11 RX ADMIN — MICONAZOLE NITRATE: 20 POWDER TOPICAL at 14:08

## 2019-08-11 RX ADMIN — HYDROCODONE BITARTRATE AND ACETAMINOPHEN 1 TABLET: 5; 325 TABLET ORAL at 12:31

## 2019-08-11 RX ADMIN — TAMSULOSIN HYDROCHLORIDE 0.4 MG: 0.4 CAPSULE ORAL at 09:30

## 2019-08-11 RX ADMIN — MIRTAZAPINE 30 MG: 30 TABLET, FILM COATED ORAL at 21:25

## 2019-08-11 RX ADMIN — HYDROCODONE BITARTRATE AND ACETAMINOPHEN 1 TABLET: 5; 325 TABLET ORAL at 21:25

## 2019-08-11 RX ADMIN — FUROSEMIDE 40 MG: 10 INJECTION, SOLUTION INTRAVENOUS at 16:08

## 2019-08-11 RX ADMIN — SPIRONOLACTONE 25 MG: 25 TABLET ORAL at 09:31

## 2019-08-11 RX ADMIN — ATORVASTATIN CALCIUM 10 MG: 10 TABLET, FILM COATED ORAL at 21:25

## 2019-08-11 RX ADMIN — PANTOPRAZOLE SODIUM 40 MG: 40 TABLET, DELAYED RELEASE ORAL at 06:40

## 2019-08-11 RX ADMIN — HYDROCODONE BITARTRATE AND ACETAMINOPHEN 1 TABLET: 5; 325 TABLET ORAL at 02:55

## 2019-08-11 RX ADMIN — FLUTICASONE FUROATE AND VILANTEROL TRIFENATATE 1 PUFF: 200; 25 POWDER RESPIRATORY (INHALATION) at 09:30

## 2019-08-11 RX ADMIN — NADOLOL 40 MG: 40 TABLET ORAL at 09:31

## 2019-08-11 RX ADMIN — ALBUMIN HUMAN 25 G: 0.25 SOLUTION INTRAVENOUS at 16:09

## 2019-08-11 RX ADMIN — FOLIC ACID 1 MG: 1 TABLET ORAL at 09:31

## 2019-08-11 RX ADMIN — CEFTRIAXONE SODIUM 1 G: 1 INJECTION, POWDER, FOR SOLUTION INTRAMUSCULAR; INTRAVENOUS at 09:29

## 2019-08-11 RX ADMIN — DEXTRAN 70, AND HYPROMELLOSE 2910 2 DROP: 1; 3 SOLUTION/ DROPS OPHTHALMIC at 09:29

## 2019-08-11 ASSESSMENT — ACTIVITIES OF DAILY LIVING (ADL)
ADLS_ACUITY_SCORE: 14

## 2019-08-11 NOTE — PLAN OF CARE
DATE & TIME: 8/11/19 [id number]                          Cognitive Concerns/ Orientation : A&Ox4  BEHAVIOR & AGGRESSION TOOL COLOR: Green   CIWA SCORE: 0, 0     ABNL VS/O2: VSS on RA   MOBILITY: Assist x1 with walker/GB  PAIN MANAGMENT: Norco given x1 for back pain with relief  DIET: Regular   BOWEL/BLADDER: Leger catheter. 3 continent BMs this shift.   ABNL LAB/BG: NA   DRAIN/DEVICES: Leger catheter for retention and I/Os. Have been monitoring q2h for orders to give PRN boluses if UOP < 50ml q2h. Has not needed any boluses this shift.   TELEMETRY RHYTHM: SB with BBB   SKIN: Jaundiced, scabs, scars.   TESTS/PROCEDURES: Repeat chest xray today.  D/C DAY/GOALS/PLACE: Pending progress   OTHER IMPORTANT INFO: Paracentesis site to R abdomen CDI. MD would like RNs to measure abdomen circumference (tape measure at bedside)

## 2019-08-11 NOTE — PLAN OF CARE
DATE & TIME: 8/11/2019 AM shift  Cognitive Concerns/ Orientation : A&O x4, forgetful  BEHAVIOR & AGGRESSION TOOL COLOR: green   CIWA SCORE: CIWA protocol d/c  ABNL VS/O2: VSS on RA. SOB/NICOLE. LS diminished. Encouraged to use IS.   MOBILITY: Ax1 with GB/WK, ambulated in the muniz x 2  PAIN MANAGMENT: Pt complains of R lower abdomen pain, Norco x 1 given with relief. Encouraged repositioning more often.   DIET:Tolerating regular diet. Denies nausea. Poor appetite today.  BOWEL/BLADDER:incontinent of bowel. 2BMs this shift, smaller amounts but still loose/watery. Leger in place. Continues on IV Albumin. Started on Lasix BID IV. Urine output muc improved, color of the urine is getting lighter.  Abd distended again. POD 2 after Paracentesis .Site started to leak, dressing applied.   ABNL LAB/BG:Bilirubin 2.4. Alk phosphat 232. Hg stable at 9.8 today. Pro calcitonin 0.15. N terminal Pro BNP elevated at 2831  DRAIN/DEVICES: PIV on right arm, SL  TELEMETRY RHYTHM: Tele d/c  SKIN: bruises throughout, bottom excoriated, using miconazole powder PRN. Complains of penile head soreness, cathter care done.   TESTS/PROCEDURES:   D/C DAY/GOALS/PLACE:Discharge pending. GI is following. Plan for paracentesis tomorrow morning.   OTHER IMPORTANT INFO: PT/OT are following, recommending TCU

## 2019-08-11 NOTE — PLAN OF CARE
OT: tx session attempted however pt sleeping upon arrival. Pt declined therapy at this time due to wanting to continue to sleep.

## 2019-08-11 NOTE — PLAN OF CARE
Discharge Planner PT   Patient plan for discharge: Not stated  Current status: Patient sitting in chair upon arrival of therapist, agreeable to working with PT. SpO2 at rest on RA noted as 98%. Sit<>stand from chair with FWW and CGA, cues for hand placement. Patient ambulated 130 feet with use of FWW and CGA to SBA, slow partial passing gait with somewhat wide [place]. Patient noting some SOB but not needing any rest break during gait. Patient needing to go down for x-ray, assisted patient onto cart in muniz with min A for B LE management. Patient in supine at end of session, NA present.   Barriers to return to prior living situation: Current level of A, decreased tolerance to activity  Recommendations for discharge: TCU  Rationale for recommendations: Patient continues to require increased level of A for all mobility/ambulation. Patient would benefit from continued skilled therapy to further improve strength, balance, and independence with mobility and ambulation to address functional limitations and decrease falls risk.         Entered by: Rowan Arciniega 08/11/2019 8:51 AM

## 2019-08-11 NOTE — PLAN OF CARE
DATE & TIME: 8/10/19 PM                           Cognitive Concerns/ Orientation : A&Ox4, forgetful    BEHAVIOR & AGGRESSION TOOL COLOR: Green   CIWA SCORE: 0/0     ABNL VS/O2: VSS on RA   MOBILITY: Up with one assist walker/gaitbelt - mild NICOLE with ambulation   PAIN MANAGMENT: C/o abdominal pain radiating to back - relieved with PRN Norco   DIET: Regular   BOWEL/BLADDER: Leger catheter, continent of bowels this shift.   ABNL LAB/BG: NA   DRAIN/DEVICES: Leger catheter for retention and I/Os. Have been monitoring q2h for new orders to give PRN boluses if UOP < 50ml q2h. Has not needed any boluses this shift.   TELEMETRY RHYTHM: SB w/ BBB   SKIN: Jaundiced, scabs, scars. + 2edema to Bilat LEs, redness to groin - Miconazole powder used x1.   TESTS/PROCEDURES: Repeat chest xray tomorrow.    D/C DAY/GOALS/PLACE: Discharge TBD pending progress   OTHER IMPORTANT INFO: Paracentesis site to R abdomen CDI. Pt receiving Albumin q12h. MD would like RNs to measure abdomen circumference (tape measure at bedside)

## 2019-08-11 NOTE — PROGRESS NOTES
[facility] Internal Medicine Progress Note     Date of Service (when I saw the patient): 08/11/2019    REASON FOR ADMISSION / INTERVAL HISTORY:  Presented with dark colored stools 8/1. Has history of alcohol dependence, alcoholic liver disease, depression and anxiety, history of GI bleed secondary to esophageal varices. UO improved. Feels more distended today. See details below    ASSESSMENT/PLAN:   MELENA/ UGI BLEED  Presented 8/1 with h/o dark stools, hg 8.7. Baseline around 10. S/p EGD 8/2-LA Grade C esophagitis, likely the cause of melena. No active bleeding seen. S/p colonoscopy 8/8-3mm polyp remszat-px-q. No active bleeding. Hg has remained stable at 9.7.  -continue protonix/ nadolol. Consider  Discontinuing  nadolol after discussing with GI Monday.    CIRRHOSIS WITH ASCITIS  S/p ultrasound guided paracentesis on 8/2/19 with removal of 3.2 liters of fluid. Repeat paracentesis 8/9-3.6 L fluid removed.   Pt with poor UO last 3-4 days-only 4-500cc/24 hrs.  Stopped lactulose/ rifaximin for low UO and diarrhea, started scheduled iv albumin, gave 1 L fluid bolus on 8/10 . Diarrhea improved-still having stools-small in amount- and UO improved.   Now seems more edematous, abdomen more distended.  -will start iv lasix bid, continue albumin infusions bid -reassess in AM. Paracentesis in AM-ordered. Could resume lactulose/ rifaximin  in a day or so..    LOW UO  Improved with above interventions.   -will start lasix iv today as above    B PULM INFILTRATES  Likely due to vasogenic/ low oncotic pressure related pulmonary edema.Pt was hypoxic needing 2 L o2. CXR 8/8 showed B pulm infiltrates. Has no fever/ leucocytosis. Was on rocephin since admit. Procalcitonin  is low hence low likelihood of pneumonia. BNP elevated.  -will Stop antbx-start iv lasix.    ETOH DEPENDENCE/ ABUSE  Apparently more confused with ativan recently than was without. Not withdrawing-1 week out  -stop CIWA    DEPRESSION/ ANXIETY  Continue  meds    DISPO  Will be in hospital  2-3 days.    SMITH YUNG MD   Pg [phone]    DVT Prhylaxis: Pneumatic Compression Devices  Code Status: DNR/DNI    ROS:  As described in A/P and Exam.  Otherwise ALL are  negative.    PHYSICAL EXAM:  All vitals have been reviewed    Blood pressure 132/77, pulse 62, temperature 98.3  F (36.8  C), temperature source Oral, resp. rate 16, weight 90.1 kg (198 lb 10.2 oz), SpO2 96 %.    I/O this shift:  In: -   Out: 175 [Urine:175]    GENERAL APPEARANCE: healthy, alert and no distress  EYES: conjunctiva clear, eyes grossly normal  HENT: external ears and nose normal   NECK: supple, no masses or adenopathy  RESP: lungs decreased BS bases - no rales, rhonchi or wheezes  CV: regular rate and rhythm, normal S1 S2, no S3 or S4 and no murmur, click or rub   ABDOMEN: soft,more distended than yesterday, nontender, no HSM or masses and bowel sounds normal  MS: no clubbing, cyanosis; 1-2+ edema  SKIN: clear without significant rashes or lesions  NEURO: -non-focal moves all 4 extr    ROUTINE  LABS (Last four results)  CMP  Recent Labs   Lab 08/11/19  0936 08/10/19  0858 08/09/19  0816 08/08/19  0720 08/07/19  1925 08/07/19  1408 08/07/19  0731  08/05/19  0615    137 141 138  --   --  137   < > 134   POTASSIUM 3.5 3.5 3.7 3.4  --   --  3.7   < > 3.5   CHLORIDE 109 108 109 107  --   --  106   < > 100   CO2 24 25 25 24  --   --  23   < > 28   ANIONGAP 5 4 7 7  --   --  8   < > 6   * 116* 93 79  --   --  76   < > 92   BUN 13 17 18 16  --   --  16   < > 14   CR 0.84 0.89 1.02 0.96  --   --  0.87   < > 0.86   GFRESTIMATED >90 90 77 82  --   --  >90   < > >90   GFRESTBLACK >90 >90 89 >90  --   --  >90   < > >90   KEV 8.0* 7.8* 8.0* 7.7*  --   --  7.7*   < > 8.0*   MAG  --   --   --  1.9 2.3 1.4* 1.4*  --  1.9   PHOS  --   --   --   --   --   --  3.2  --  2.8   PROTTOTAL 5.9* 5.5* 5.7* 5.3*  --   --   --    < > 5.7*   ALBUMIN 2.7* 1.9* 1.9* 1.8*  --   --   --    < > 2.0*   BILITOTAL 2.4*  2.8* 3.6* 3.8*  --   --   --    < > 4.6*   ALKPHOS 232* 251* 274* 262*  --   --   --    < > 276*   AST 36 42 51* 52*  --   --   --    < > 99*   ALT 24 26 29 29  --   --   --    < > 46    < > = values in this interval not displayed.     CBC  Recent Labs   Lab 08/11/19  0936 08/10/19  0858 08/09/19  1047 08/08/19  0720 08/07/19  0731 08/06/19  0739   WBC 4.3  --   --  4.6 4.8 5.2   RBC 3.22*  --   --  3.14* 3.34* 3.54*   HGB 9.8* 9.7* 9.3* 9.6* 10.2* 10.8*   HCT 29.5*  --   --  28.5* 30.6* 32.2*   MCV 92  --   --  91 92 91   MCH 30.4  --   --  30.6 30.5 30.5   MCHC 33.2  --   --  33.7 33.3 33.5   RDW 17.9*  --   --  18.2* 18.2* 18.1*     --   --  186 184 191     INR  Recent Labs   Lab 08/06/19  0739   INR 1.31*     Arterial Blood GasNo lab results found in last 7 days.    Recent Results (from the past 24 hour(s))   XR Chest 1 View    Narrative    CHEST ONE VIEW   8/11/2019 8:55 AM     HISTORY: Followup bilateral infiltrates.    COMPARISON: Chest x-ray 8/8/2019.      Impression    IMPRESSION: Portable view of the chest is performed. Mild pleural  thickening lateral right lower hemithorax is noted along with old  healed right lower rib fractures. Heart is normal in size. Right mid  and lower lung infiltrate is unchanged. Retrocardiac infiltrate is  also stable. No pneumothorax or obvious pleural effusions.    ORIN PARSONS MD

## 2019-08-11 NOTE — PROGRESS NOTES
[facility]  Gastroenterology Progress Note     Jim Richard MRN# [id number]   YOB: 1955 Age: 64 year old          Assessment and Plan:       GI bleed- EGD showed esophagitis, no significant esophageal varices.  Colonoscopy with polyps but no concerning lesions. Hemoglobin stable.   Ascites- Status post ultrasound guided paracentesis removed 3600 mL of fluid on 8/9. Has helped abdominal discomfort however has had recurrent development of ascites- patient may benefit from repeat ultrasound guided paracentesis.. BMP essentially normal and would benefit from diuretics. Has had low urine output. Continue with spironolactone. Will repeat CMP in a.m. Monitor potassium closely.   Cirrhosis- Patient has no evidence of confusion and oriented. Has had lactulose and rifaximin held. Will recheck ammonia tomorrow. N evidence of encephalopathy. Continue with thiamine, folic acid and multivitamin.LFTs trending down.             Gastrointestinal hemorrhage, unspecified gastrointestinal hemorrhage type  Other ascites      Interval History:   no new complaints, doing well, denies chest pain, denies shortness of breath, pain is controlled, alert, oriented to person, place and time and has had a bowel movement in the last 24 hours              Review of Systems:   C: NEGATIVE for fever, chills, change in weight  E/M: NEGATIVE for ear, mouth and throat problems  R: NEGATIVE for significant cough or SOB  CV: NEGATIVE for chest pain, palpitations or peripheral edema             Medications:   I have reviewed this patient's current medications    albumin human  25 g Intravenous Q12H     atorvastatin  10 mg Oral At Bedtime     cefTRIAXone  1 g Intravenous Q24H     DULoxetine  60 mg Oral Daily     fluticasone-vilanterol  1 puff Inhalation Daily     folic acid  1 mg Oral Daily     mirtazapine  30 mg Oral At Bedtime     multivitamin w/minerals  1 tablet Oral Daily     nadolol  40 mg Oral Daily      pantoprazole  40 mg Oral QAM AC     sodium chloride (PF)  3 mL Intracatheter Q8H     spironolactone  25 mg Oral Daily     tamsulosin  0.4 mg Oral Daily                  Physical Exam:   Vitals were reviewed  Vital Signs with Ranges  Temp:  [97.2  F (36.2  C)-98.9  F (37.2  C)] 98.3  F (36.8  C)  Heart Rate:  [52-57] 52  Resp:  [16] 16  BP: (132-135)/(73-77) 132/77  SpO2:  [94 %-98 %] 96 %  I/O last 3 completed shifts:  In: 1600 [P.O.:600; IV Piggyback:1000]  Out: 730 [Urine:730]  Constitutional: healthy, alert and no distress   Cardiovascular: negative, PMI normal. No lifts, heaves, or thrills. RRR. No murmurs, clicks gallops or rub  Respiratory: negative, Percussion normal. Good diaphragmatic excursion. Lungs clear  Head: Normocephalic. No masses, lesions, tenderness or abnormalities  Neck: Neck supple. No adenopathy. Thyroid symmetric, normal size,, Carotids without bruits.  Abdomen: Abdomen soft, tender. BS normal. No masses, organomegaly, positive findings: distended           Data:   I reviewed the patient's new clinical lab test results.   Recent Labs   Lab Test 08/11/19  0936 08/10/19  0858 08/09/19  1047 08/08/19  0720 08/07/19  0731 08/06/19  0739  08/01/19  1629  09/29/18  0550   WBC 4.3  --   --  4.6 4.8 5.2   < > 3.8*   < > 6.1   HGB 9.8* 9.7* 9.3* 9.6* 10.2* 10.8*   < > 8.7*   < > 10.1*   MCV 92  --   --  91 92 91   < > 95   < > 74*     --   --  186 184 191   < > 155   < > 207   INR  --   --   --   --   --  1.31*  --  1.18*  --  1.10    < > = values in this interval not displayed.     Recent Labs   Lab Test 08/10/19  0858 08/09/19  0816 08/08/19  0720   POTASSIUM 3.5 3.7 3.4   CHLORIDE 108 109 107   CO2 25 25 24   BUN 17 18 16   ANIONGAP 4 7 7     Recent Labs   Lab Test 08/10/19  0858 08/09/19  0816 08/08/19  0720  02/13/19  2340  09/29/18  0550  03/21/18  1555  09/25/14  0510  06/12/12  0550   ALBUMIN 1.9* 1.9* 1.8*   < > 3.1*   < > 3.2*   < > 2.9*   < >  --    < >  --    BILITOTAL 2.8* 3.6*  3.8*   < > 0.4   < > 1.0   < > 0.9   < >  --    < >  --    ALT 26 29 29   < > 41   < > 60   < > 74*   < >  --    < >  --    AST 42 51* 52*   < > 89*   < > 140*   < > 136*   < >  --    < >  --    PROTEIN  --   --   --   --   --   --   --   --   --   --  Negative  --  Negative   LIPASE  --   --   --   --  536*  --  585*  --  316  --   --   --   --     < > = values in this interval not displayed.       I reviewed the patient's new imaging results.    All laboratory data reviewed  All imaging studies reviewed by me.    Gloria De Leon PA-C,  8/11/2019  [facility]  Office : [phone]  Cell: [phone] (Dr. Shaw)  Cell: [phone] (Gloria De Leon PA-C)

## 2019-08-12 ENCOUNTER — APPOINTMENT (OUTPATIENT)
Dept: ULTRASOUND IMAGING | Facility: CLINIC | Age: 64
DRG: 369 | End: 2019-08-12
Attending: INTERNAL MEDICINE
Payer: MEDICARE

## 2019-08-12 LAB
ALBUMIN SERPL-MCNC: 3.1 G/DL (ref 3.4–5)
ALP SERPL-CCNC: 232 U/L (ref 40–150)
ALT SERPL W P-5'-P-CCNC: 24 U/L (ref 0–70)
AMMONIA PLAS-SCNC: 16 UMOL/L (ref 10–50)
ANION GAP SERPL CALCULATED.3IONS-SCNC: 7 MMOL/L (ref 3–14)
AST SERPL W P-5'-P-CCNC: 37 U/L (ref 0–45)
BILIRUB SERPL-MCNC: 2.2 MG/DL (ref 0.2–1.3)
BUN SERPL-MCNC: 15 MG/DL (ref 7–30)
CALCIUM SERPL-MCNC: 8.1 MG/DL (ref 8.5–10.1)
CHLORIDE SERPL-SCNC: 101 MMOL/L (ref 94–109)
CO2 SERPL-SCNC: 26 MMOL/L (ref 20–32)
COPATH REPORT: NORMAL
CREAT SERPL-MCNC: 0.92 MG/DL (ref 0.66–1.25)
GFR SERPL CREATININE-BSD FRML MDRD: 87 ML/MIN/{1.73_M2}
GLUCOSE SERPL-MCNC: 116 MG/DL (ref 70–99)
POTASSIUM SERPL-SCNC: 3.1 MMOL/L (ref 3.4–5.3)
POTASSIUM SERPL-SCNC: 3.7 MMOL/L (ref 3.4–5.3)
PROT SERPL-MCNC: 6.2 G/DL (ref 6.8–8.8)
SODIUM SERPL-SCNC: 134 MMOL/L (ref 133–144)

## 2019-08-12 PROCEDURE — 27210190 US PARACENTESIS

## 2019-08-12 PROCEDURE — 82140 ASSAY OF AMMONIA: CPT | Performed by: PHYSICIAN ASSISTANT

## 2019-08-12 PROCEDURE — 25000132 ZZH RX MED GY IP 250 OP 250 PS 637: Mod: GY | Performed by: INTERNAL MEDICINE

## 2019-08-12 PROCEDURE — 36415 COLL VENOUS BLD VENIPUNCTURE: CPT | Performed by: PHYSICIAN ASSISTANT

## 2019-08-12 PROCEDURE — 25000132 ZZH RX MED GY IP 250 OP 250 PS 637: Mod: GY | Performed by: HOSPITALIST

## 2019-08-12 PROCEDURE — 25000132 ZZH RX MED GY IP 250 OP 250 PS 637: Mod: GY | Performed by: PHYSICIAN ASSISTANT

## 2019-08-12 PROCEDURE — 25000128 H RX IP 250 OP 636: Performed by: INTERNAL MEDICINE

## 2019-08-12 PROCEDURE — 99232 SBSQ HOSP IP/OBS MODERATE 35: CPT | Performed by: HOSPITALIST

## 2019-08-12 PROCEDURE — 84132 ASSAY OF SERUM POTASSIUM: CPT | Performed by: HOSPITALIST

## 2019-08-12 PROCEDURE — 36415 COLL VENOUS BLD VENIPUNCTURE: CPT | Performed by: HOSPITALIST

## 2019-08-12 PROCEDURE — P9047 ALBUMIN (HUMAN), 25%, 50ML: HCPCS | Performed by: INTERNAL MEDICINE

## 2019-08-12 PROCEDURE — 25000125 ZZHC RX 250: Performed by: RADIOLOGY

## 2019-08-12 PROCEDURE — 12000000 ZZH R&B MED SURG/OB

## 2019-08-12 PROCEDURE — 40000863 ZZH STATISTIC RADIOLOGY XRAY, US, CT, MAR, NM

## 2019-08-12 PROCEDURE — 80053 COMPREHEN METABOLIC PANEL: CPT | Performed by: INTERNAL MEDICINE

## 2019-08-12 RX ORDER — DEXTROSE MONOHYDRATE 25 G/50ML
25-50 INJECTION, SOLUTION INTRAVENOUS
Status: DISCONTINUED | OUTPATIENT
Start: 2019-08-12 | End: 2019-08-12

## 2019-08-12 RX ORDER — NICOTINE POLACRILEX 4 MG
15-30 LOZENGE BUCCAL
Status: DISCONTINUED | OUTPATIENT
Start: 2019-08-12 | End: 2019-08-12

## 2019-08-12 RX ORDER — LIDOCAINE 40 MG/G
CREAM TOPICAL
Status: DISCONTINUED | OUTPATIENT
Start: 2019-08-12 | End: 2019-08-12

## 2019-08-12 RX ORDER — ALBUMIN (HUMAN) 12.5 G/50ML
12.5 SOLUTION INTRAVENOUS ONCE
Status: DISCONTINUED | OUTPATIENT
Start: 2019-08-12 | End: 2019-08-12 | Stop reason: CLARIF

## 2019-08-12 RX ORDER — LIDOCAINE HYDROCHLORIDE 10 MG/ML
10 INJECTION, SOLUTION EPIDURAL; INFILTRATION; INTRACAUDAL; PERINEURAL ONCE
Status: COMPLETED | OUTPATIENT
Start: 2019-08-12 | End: 2019-08-12

## 2019-08-12 RX ADMIN — FLUTICASONE FUROATE AND VILANTEROL TRIFENATATE 1 PUFF: 200; 25 POWDER RESPIRATORY (INHALATION) at 08:27

## 2019-08-12 RX ADMIN — POTASSIUM CHLORIDE 40 MEQ: 1500 TABLET, EXTENDED RELEASE ORAL at 10:15

## 2019-08-12 RX ADMIN — HYDROCODONE BITARTRATE AND ACETAMINOPHEN 1 TABLET: 5; 325 TABLET ORAL at 03:28

## 2019-08-12 RX ADMIN — SPIRONOLACTONE 25 MG: 25 TABLET ORAL at 08:27

## 2019-08-12 RX ADMIN — POTASSIUM CHLORIDE 20 MEQ: 1500 TABLET, EXTENDED RELEASE ORAL at 12:41

## 2019-08-12 RX ADMIN — LACTULOSE 20 G: 20 POWDER, FOR SOLUTION ORAL at 21:41

## 2019-08-12 RX ADMIN — NADOLOL 40 MG: 40 TABLET ORAL at 08:27

## 2019-08-12 RX ADMIN — FUROSEMIDE 40 MG: 10 INJECTION, SOLUTION INTRAVENOUS at 15:54

## 2019-08-12 RX ADMIN — TAMSULOSIN HYDROCHLORIDE 0.4 MG: 0.4 CAPSULE ORAL at 08:27

## 2019-08-12 RX ADMIN — FUROSEMIDE 40 MG: 10 INJECTION, SOLUTION INTRAVENOUS at 08:25

## 2019-08-12 RX ADMIN — DEXTRAN 70, AND HYPROMELLOSE 2910 2 DROP: 1; 3 SOLUTION/ DROPS OPHTHALMIC at 02:18

## 2019-08-12 RX ADMIN — DULOXETINE HYDROCHLORIDE 60 MG: 60 CAPSULE, DELAYED RELEASE ORAL at 08:27

## 2019-08-12 RX ADMIN — ALBUTEROL SULFATE 2 PUFF: 90 AEROSOL, METERED RESPIRATORY (INHALATION) at 15:54

## 2019-08-12 RX ADMIN — KETOTIFEN FUMARATE 1 DROP: 0.35 SOLUTION/ DROPS OPHTHALMIC at 08:28

## 2019-08-12 RX ADMIN — NICOTINE 1 PATCH: 21 PATCH, EXTENDED RELEASE TRANSDERMAL at 08:27

## 2019-08-12 RX ADMIN — DEXTRAN 70, AND HYPROMELLOSE 2910 2 DROP: 1; 3 SOLUTION/ DROPS OPHTHALMIC at 21:43

## 2019-08-12 RX ADMIN — LACTULOSE 20 G: 20 POWDER, FOR SOLUTION ORAL at 10:49

## 2019-08-12 RX ADMIN — ALBUMIN HUMAN 25 G: 0.25 SOLUTION INTRAVENOUS at 03:23

## 2019-08-12 RX ADMIN — LACTULOSE 20 G: 20 POWDER, FOR SOLUTION ORAL at 15:54

## 2019-08-12 RX ADMIN — FOLIC ACID 1 MG: 1 TABLET ORAL at 08:27

## 2019-08-12 RX ADMIN — TRAMADOL HYDROCHLORIDE 25 MG: 50 TABLET ORAL at 14:58

## 2019-08-12 RX ADMIN — MULTIPLE VITAMINS W/ MINERALS TAB 1 TABLET: TAB at 08:27

## 2019-08-12 RX ADMIN — MIRTAZAPINE 30 MG: 30 TABLET, FILM COATED ORAL at 21:41

## 2019-08-12 RX ADMIN — TRAMADOL HYDROCHLORIDE 25 MG: 50 TABLET ORAL at 23:56

## 2019-08-12 RX ADMIN — PANTOPRAZOLE SODIUM 40 MG: 40 TABLET, DELAYED RELEASE ORAL at 06:30

## 2019-08-12 RX ADMIN — HYDROCODONE BITARTRATE AND ACETAMINOPHEN 1 TABLET: 5; 325 TABLET ORAL at 10:15

## 2019-08-12 RX ADMIN — LIDOCAINE HYDROCHLORIDE 10 ML: 10 INJECTION, SOLUTION EPIDURAL; INFILTRATION; INTRACAUDAL; PERINEURAL at 09:24

## 2019-08-12 ASSESSMENT — ACTIVITIES OF DAILY LIVING (ADL)
ADLS_ACUITY_SCORE: 15
ADLS_ACUITY_SCORE: 14

## 2019-08-12 NOTE — PROGRESS NOTES
"CLINICAL NUTRITION SERVICES - REASSESSMENT NOTE      Recommendations Ordered by Registered Dietitian (RD):   Boost Plus (chocolate) 10 am daily   Malnutrition: Updated 8/12  % Weight Loss:  > 5% in 1 month (severe malnutrition)  % Intake:  <75% for > 7 days (non-severe malnutrition)  Subcutaneous Fat Loss:  Orbital region mild depletion and Upper arm region moderate depletion  Muscle Loss:  Temporal region mild depletion and Clavicle bone region mild depletion -- unable to document LE losses d/t fluid retention   Fluid Retention:  None charted     Malnutrition Diagnosis: Non-Severe malnutrition  In Context of:  Acute illness or injury  Environmental or social circumstances       EVALUATION OF PROGRESS TOWARD GOALS   Diet: Regular     Intake/Tolerance:    Meals ordered 2-3x per day. Per flowsheets, pt has been consuming % of them daily.   Poor appetite recorded.     Visited with patient today. Reports appetite is \"not too bad,\" which he feels is baseline. He tries to finish 100% of his meals. Notes that he has been eating eggs and fruit, and avoiding vegetables because they \"run right through\" him    Updated Nutrition History = Regular diet at baseline with meals BID. Cooks for self or dines out at [employer] or [email or web address]. Acknowledges ETOH use and feels he has lost some weight over time because of his drinking. UBW is ~200#. Endorses muscle and fat loss in his arms, and expressed frustration of his swelling LEs. Denies taking protein supplements in the past.     - Noted per chart review --> redness to groin and blanchable redness to coccyx; jaundiced     - A partial nutrition focused physical exam was completed during this visit    Vitals:    08/08/19 0625 08/09/19 0533 08/10/19 0603 08/11/19 0554   Weight: 85.3 kg (188 lb 0.8 oz) 91.9 kg (202 lb 9.6 oz) 89.4 kg (197 lb 1.5 oz) 90.1 kg (198 lb 10.2 oz)    08/12/19 0650   Weight: 84.4 kg (186 lb)       NEW FINDINGS:   8/12: Paracentesis   8/11: Start IV lasix   8/10: " Multiple loose BMs; lactulose held   8/10: Paracentesis --> 3600 mL removed    Previous Goals:   Pt's diet will advance in the next 24-48 hrs.   Evaluation: Met    Previous Nutrition Diagnosis:   Inadequate oral intake related to altered GI function as evidenced by variable intake 0-100% for the past 7 days and 6% weight loss in the past 1 month.   Evaluation: Improving, updated below      MALNUTRITION -- Updated 8/12  % Weight Loss:  > 5% in 1 month (severe malnutrition)  % Intake:  <75% for > 7 days (non-severe malnutrition)  Subcutaneous Fat Loss:  Orbital region mild depletion and Upper arm region moderate depletion  Muscle Loss:  Temporal region mild depletion and Clavicle bone region mild depletion -- unable to document LE losses d/t fluid retention   Fluid Retention:  None charted     Malnutrition Diagnosis: Non-Severe malnutrition  In Context of:  Acute illness or injury  Environmental or social circumstances    CURRENT NUTRITION DIAGNOSIS  Malnutrition related to decreased PO PTA 2/2 ETOH, altered GI function as evidenced by intakes <75% for >7 days, wt loss 6% in 1 month with e/o fat/muscle wasting     INTERVENTIONS  Recommendations / Nutrition Prescription  Continue diet per MD. Patient with a few questions on low fat diet at this time -- all were answered     Boost Plus (chocolate) 10 am daily    Implementation  Medical Food Supplement as above     Goals  Pt to consume >/=75% meals TID with at least 1 supplement daily       MONITORING AND EVALUATION:  Progress towards goals will be monitored and evaluated per protocol and Practice Guidelines      Susana Cowart RD, LD  Clinical Dietitian

## 2019-08-12 NOTE — PLAN OF CARE
DATE & TIME: 8/12 Day                    Cognitive Concerns/ Orientation : A&Ox4   BEHAVIOR & AGGRESSION TOOL COLOR: Green  CIWA SCORE: NA  ABNL VS/O2: VSS on RA, except bradycardic in 50s  MOBILITY: AX1 GB/ walker  PAIN MANAGMENT: Norco X1 for abdominal pain (now discontinued and switched to Tramadol)  DIET: Low fat, fluid restriction 2L  BOWEL/BLADDER: Continent- jiang removed/ no BM this shift  ABNL LAB/BG: K 3.1  DRAIN/DEVICES: PIV SL  TELEMETRY RHYTHM: NA  SKIN: Scabs/ bruising, BLE edema  TESTS/PROCEDURES: Paracentesis today- 1.65L removed  D/C DAY/GOALS/PLACE: Pending improvement, possibly to TCU.  OTHER IMPORTANT INFO: GI following. Albumin discontinued and Lactulose restarted.

## 2019-08-12 NOTE — PLAN OF CARE
DATE & TIME: 8/12/19 5126-2445                        Cognitive Concerns/ Orientation : A&Ox4, forgetful   BEHAVIOR & AGGRESSION TOOL COLOR: Green   CIWA SCORE: NA, was discontinued yesterday.      ABNL VS/O2: VSS on RA  MOBILITY: Assist x1 with walker/gait belt.   PAIN MANAGMENT: Norco given x1 for abdominal pain with relief.   DIET: Regular  BOWEL/BLADDER: Leger cath. No BM this shift   ABNL LAB/BG: Pro BNP 2,831, Hgb 9.8  DRAIN/DEVICES: PIV SL   TELEMETRY RHYTHM: NA, was discontinued yesterday.  SKIN: Jaundiced, bruises. Redness to groin and blanchable redness to coccyx. Paracentesis site dressing CDI.   TESTS/PROCEDURES: Paracentesis today.    D/C DAY/GOALS/PLACE: Pending progress  OTHER IMPORTANT INFO: Pt did not get a lot of sleep overnight, unable to get comfortable, was up to chair twice. GI following.

## 2019-08-12 NOTE — PROGRESS NOTES
[facility]  Hospitalist Progress Note   08/12/2019          Assessment and Plan:       Jim Richard is a 64 year old male with history of alcohol dependence, alcoholic liver disease, history of GI bleed secondary to esophageal varices admitted on 8/1/2019 with dark-colored stools.    Hematemesis with upper GI bleed.  History of  blood loss anemia.  History of duodenal ulcer and esophageal varices with portal hypertension.  Presented with dark-colored stools, hemoglobin 8.7 on admission.  Baseline hemoglobin around 10.  EGD 8/2-LA Grade C esophagitis, likely the cause of melena. No active bleeding seen.   S/p colonoscopy 8/8-3mm polyp removed. No active bleeding.   Blood transfusion with which hemoglobin has been stable greater than 7.  Continue Protonix twice daily and nadolol.    Decompensated alcoholic liver cirrhosis with ascites.  He was continued on octreotide and ceftriaxone earlier in the admission.  S/p ultrasound guided paracentesis on 8/2/19 with removal of 3.2 liters of fluid.   Repeat paracentesis 8/9-3.6 L fluid removed.   Paracentesis again this morning.  Has been started on diuresis with intravenous Lasix 40 mg IV twice daily to be continued.  Reassess volume status a.m.  Restarted PTA lactulose and rifaximin [held due to low urine output and diarrhea]  Has been receiving IV albumin since 8/10 with discontinued today.    History of alcohol dependence.  He has been off CIWA.  Encouraged cessation.  Continue thiamine multivitamin and folic acid supplements.  Monitor electrolytes were given history of alcohol use, on IV diuresis.  Psychiatry recommend chemical dependency evaluation, patient did refuse this and intentions of resuming AA.  Will not pursue CD commitment.    Pulmonary infiltrates on imaging.  Chest x-ray New increased opacity in the right lung base suspicious for pneumonia.  No fever.  No leukocytosis.  Was on IV Rocephin which was discontinued.  Procalcitonin low  likelihood of pneumonia.  BNP elevated.  We will continue to monitor fever curve, WBC count.  Repeat imaging in 4 to 6 weeks or earlier if symptomatic.  History of depression and anxiety.  Continue PTA Cymbalta and remeron    Hypertension.  Continue Spironolactone and Lasix as above.    Chronic low back pain.  Currently on Norco, switch to oral tramadol.  Warm compress PRN.  Minimize narcotic use as able to.    BPH.  Continue PTA Flomax.    Physical deconditioning due to chronic illness.  PT, OT assessment ongoing.  Recommend TCU.    History of COPD.  Continue PTA Breo Ellipta and albuterol     History of hyperlipidemia.  Continue PTA statin therapy in the setting of deranged liver function test, recheck lipid panel a.m.     Orders Placed This Encounter      Low Saturated Fat Na <2400 mg      DVT Prophylaxis: SCD, ambulate.  Code Status: DNR/DNI  Disposition: Expected discharge in 2 days pending clinical improvement    Discussed with patient, bedside RN    Elena Chiang MD        Interval History:      Sitting up in chair.  Just had paracentesis with no immediate complications.  Complaining of abdominal discomfort relief of symptoms with the Norco.  Continues to have Leger catheter, on diuresis with intravenous Lasix.  Afebrile.  There is no chest pain.  Minimal ambulation.  No nausea vomiting.  One bowel movement yesterday.       Physical Exam:        Physical Exam   Temp:  [97.3  F (36.3  C)-98.6  F (37  C)] 98  F (36.7  C)  Pulse:  [61] 61  Heart Rate:  [50-62] 57  Resp:  [16-18] 16  BP: (121-145)/(66-78) 131/66  SpO2:  [92 %-98 %] 94 %    Intake/Output Summary (Last 24 hours) at 8/12/2019 1410  Last data filed at 8/12/2019 1407  Gross per 24 hour   Intake 1086 ml   Output 6250 ml   Net -5164 ml     Admission Weight: 90.7 kg (200 lb)  Current Weight: 84.4 kg (186 lb)    PHYSICAL EXAM  GENERAL: Patient is in no distress. Alert and oriented.  HEART: Regular rate and rhythm. S1S2. No murmurs  LUNGS: Bilateral  decreased breath sounds, no wheezing no crackles.  ABDOMEN: Soft, distended, + abdominal tenderness, bowel sounds heard   NEURO: moving all extremities, no focal weakness.  EXTREMITIES: trace pedal edema. 2+ peripheral pulses.  SKIN: Warm, dry. No rash or bruising.  PSYCHIATRY Cooperative       Medications:          DULoxetine  60 mg Oral Daily     fluticasone-vilanterol  1 puff Inhalation Daily     folic acid  1 mg Oral Daily     furosemide  40 mg Intravenous BID     lactulose  20 g Oral TID     mirtazapine  30 mg Oral At Bedtime     multivitamin w/minerals  1 tablet Oral Daily     nadolol  40 mg Oral Daily     nicotine  1 patch Transdermal Daily     nicotine   Transdermal Q8H     nicotine   Transdermal Daily     pantoprazole  40 mg Oral QAM AC     sodium chloride (PF)  3 mL Intracatheter Q8H     spironolactone  25 mg Oral Daily     tamsulosin  0.4 mg Oral Daily     acetaminophen, albuterol, bisacodyl, glucose **OR** dextrose **OR** glucagon, HYDROmorphone, hypromellose-dextran, ketotifen, lidocaine 4%, lidocaine (buffered or not buffered), magnesium sulfate, - MEDICATION INSTRUCTIONS -, - MEDICATION INSTRUCTIONS -, miconazole, naloxone, ondansetron **OR** ondansetron, potassium chloride, potassium chloride with lidocaine, potassium chloride, potassium chloride, potassium chloride, potassium phosphate (KPHOS) in D5W IV, potassium phosphate (KPHOS) in D5W IV, potassium phosphate (KPHOS) in D5W IV, potassium phosphate (KPHOS) in D5W IV, prochlorperazine **OR** prochlorperazine **OR** prochlorperazine, QUEtiapine, sodium chloride (PF), traMADol         Data:      All new lab and imaging data was reviewed.

## 2019-08-12 NOTE — PLAN OF CARE
DATE & TIME: 8/12/19 1500-1930                    Cognitive Concerns/ Orientation : A&Ox4   BEHAVIOR & AGGRESSION TOOL COLOR: Green  CIWA SCORE: n/a  ABNL VS/O2: VSS on RA  MOBILITY: Up with 1 assist, GB & walker  PAIN MANAGMENT: denies pain  DIET: Low fat/na, fluid restriction 2L  BOWEL/BLADDER: jiang removed this am, voiding adequately per urinal; loose BM x3 (pt on lactulose)  ABNL LAB/BG: K 3.1, replaced, recheck pending  DRAIN/DEVICES: PIV SL  TELEMETRY RHYTHM: n/a  SKIN: Scabs/bruising, +2/3 BLE edema  TESTS/PROCEDURES: Paracentesis today- dressings CDI  D/C DAY/GOALS/PLACE: Pending improvement, possibly to TCU.  OTHER IMPORTANT INFO: GI following.

## 2019-08-12 NOTE — PROGRESS NOTES
0920 Time Out done.    0921 Procedure started     Paracentesis: Pt tolerated well. VSS. 1650 cc dark yellow fluid removed from abdomen w/o difficulty. Bandaid applied to site - CDI.     0937 Procedure completed    0940 Pt back to rm 621-2per cart & transport.

## 2019-08-12 NOTE — PLAN OF CARE
DATE & TIME: 8/11/19 PM                           Cognitive Concerns/ Orientation : A&Ox4, forgetful   BEHAVIOR & AGGRESSION TOOL COLOR: Green   CIWA SCORE: Discontinued             ABNL VS/O2: VSS on RA, mild NICOLE, LS diminished.   MOBILITY: Up with one walker/gaitbelt   PAIN MANAGMENT: Denies pain   DIET: Regular - tolerating   BOWEL/BLADDER: Leger Cathter - much better UOP since last evening. Urine clear yellow. No BM this shift   ABNL LAB/BG: Pro BNP 2,831  DRAIN/DEVICES: PIV SL - Receiving IV Albumin q12h  TELEMETRY RHYTHM: Discontinued   SKIN: Jaundiced, bruises, Leaking paracentesis site - Placed Dermabond on site. Redness to groin and blanchable redness to coccyx.   TESTS/PROCEDURES: Paracentesis tomorrow   D/C DAY/GOALS/PLACE: Discharge pending progress   OTHER IMPORTANT INFO: GI following, Pt requested nicotine patch - ordered and on L deltoid

## 2019-08-13 ENCOUNTER — APPOINTMENT (OUTPATIENT)
Dept: OCCUPATIONAL THERAPY | Facility: CLINIC | Age: 64
DRG: 369 | End: 2019-08-13
Payer: MEDICARE

## 2019-08-13 LAB
ANION GAP SERPL CALCULATED.3IONS-SCNC: 7 MMOL/L (ref 3–14)
BUN SERPL-MCNC: 15 MG/DL (ref 7–30)
CALCIUM SERPL-MCNC: 8.3 MG/DL (ref 8.5–10.1)
CHLORIDE SERPL-SCNC: 101 MMOL/L (ref 94–109)
CHOLEST SERPL-MCNC: 113 MG/DL
CO2 SERPL-SCNC: 25 MMOL/L (ref 20–32)
CREAT SERPL-MCNC: 0.96 MG/DL (ref 0.66–1.25)
GFR SERPL CREATININE-BSD FRML MDRD: 82 ML/MIN/{1.73_M2}
GLUCOSE SERPL-MCNC: 99 MG/DL (ref 70–99)
HDLC SERPL-MCNC: 15 MG/DL
HGB BLD-MCNC: 9.9 G/DL (ref 13.3–17.7)
LDLC SERPL CALC-MCNC: 70 MG/DL
MAGNESIUM SERPL-MCNC: 1.2 MG/DL (ref 1.6–2.3)
MAGNESIUM SERPL-MCNC: 2 MG/DL (ref 1.6–2.3)
NONHDLC SERPL-MCNC: 98 MG/DL
PHOSPHATE SERPL-MCNC: 3.1 MG/DL (ref 2.5–4.5)
POTASSIUM SERPL-SCNC: 3.5 MMOL/L (ref 3.4–5.3)
SODIUM SERPL-SCNC: 133 MMOL/L (ref 133–144)
TRIGL SERPL-MCNC: 140 MG/DL

## 2019-08-13 PROCEDURE — 25000132 ZZH RX MED GY IP 250 OP 250 PS 637: Mod: GY | Performed by: INTERNAL MEDICINE

## 2019-08-13 PROCEDURE — 85018 HEMOGLOBIN: CPT | Performed by: HOSPITALIST

## 2019-08-13 PROCEDURE — 83735 ASSAY OF MAGNESIUM: CPT | Performed by: HOSPITALIST

## 2019-08-13 PROCEDURE — 97530 THERAPEUTIC ACTIVITIES: CPT | Mod: GO | Performed by: OCCUPATIONAL THERAPY ASSISTANT

## 2019-08-13 PROCEDURE — 97535 SELF CARE MNGMENT TRAINING: CPT | Mod: GO | Performed by: OCCUPATIONAL THERAPY ASSISTANT

## 2019-08-13 PROCEDURE — 25000132 ZZH RX MED GY IP 250 OP 250 PS 637: Mod: GY | Performed by: HOSPITALIST

## 2019-08-13 PROCEDURE — 25000132 ZZH RX MED GY IP 250 OP 250 PS 637: Mod: GY | Performed by: PHYSICIAN ASSISTANT

## 2019-08-13 PROCEDURE — 12000000 ZZH R&B MED SURG/OB

## 2019-08-13 PROCEDURE — 25000128 H RX IP 250 OP 636: Performed by: INTERNAL MEDICINE

## 2019-08-13 PROCEDURE — 99232 SBSQ HOSP IP/OBS MODERATE 35: CPT | Performed by: HOSPITALIST

## 2019-08-13 PROCEDURE — 80048 BASIC METABOLIC PNL TOTAL CA: CPT | Performed by: HOSPITALIST

## 2019-08-13 PROCEDURE — 80061 LIPID PANEL: CPT | Performed by: HOSPITALIST

## 2019-08-13 PROCEDURE — 84100 ASSAY OF PHOSPHORUS: CPT | Performed by: HOSPITALIST

## 2019-08-13 PROCEDURE — 36415 COLL VENOUS BLD VENIPUNCTURE: CPT | Performed by: HOSPITALIST

## 2019-08-13 RX ORDER — MAGNESIUM SULFATE HEPTAHYDRATE 40 MG/ML
4 INJECTION, SOLUTION INTRAVENOUS EVERY 4 HOURS PRN
Status: DISCONTINUED | OUTPATIENT
Start: 2019-08-13 | End: 2019-08-14 | Stop reason: HOSPADM

## 2019-08-13 RX ADMIN — PANTOPRAZOLE SODIUM 40 MG: 40 TABLET, DELAYED RELEASE ORAL at 06:54

## 2019-08-13 RX ADMIN — MIRTAZAPINE 30 MG: 30 TABLET, FILM COATED ORAL at 21:33

## 2019-08-13 RX ADMIN — SPIRONOLACTONE 25 MG: 25 TABLET ORAL at 08:07

## 2019-08-13 RX ADMIN — MAGNESIUM SULFATE IN WATER 4 G: 40 INJECTION, SOLUTION INTRAVENOUS at 11:09

## 2019-08-13 RX ADMIN — DULOXETINE HYDROCHLORIDE 60 MG: 60 CAPSULE, DELAYED RELEASE ORAL at 08:08

## 2019-08-13 RX ADMIN — TRAMADOL HYDROCHLORIDE 25 MG: 50 TABLET ORAL at 15:33

## 2019-08-13 RX ADMIN — FLUTICASONE FUROATE AND VILANTEROL TRIFENATATE 1 PUFF: 200; 25 POWDER RESPIRATORY (INHALATION) at 08:18

## 2019-08-13 RX ADMIN — TAMSULOSIN HYDROCHLORIDE 0.4 MG: 0.4 CAPSULE ORAL at 08:08

## 2019-08-13 RX ADMIN — MULTIPLE VITAMINS W/ MINERALS TAB 1 TABLET: TAB at 08:07

## 2019-08-13 RX ADMIN — NADOLOL 40 MG: 40 TABLET ORAL at 08:08

## 2019-08-13 RX ADMIN — FUROSEMIDE 40 MG: 10 INJECTION, SOLUTION INTRAVENOUS at 08:08

## 2019-08-13 RX ADMIN — DEXTRAN 70, AND HYPROMELLOSE 2910 2 DROP: 1; 3 SOLUTION/ DROPS OPHTHALMIC at 21:34

## 2019-08-13 RX ADMIN — NICOTINE 1 PATCH: 21 PATCH, EXTENDED RELEASE TRANSDERMAL at 08:07

## 2019-08-13 RX ADMIN — FOLIC ACID 1 MG: 1 TABLET ORAL at 08:08

## 2019-08-13 RX ADMIN — KETOTIFEN FUMARATE 1 DROP: 0.35 SOLUTION/ DROPS OPHTHALMIC at 15:42

## 2019-08-13 RX ADMIN — FUROSEMIDE 40 MG: 10 INJECTION, SOLUTION INTRAVENOUS at 15:34

## 2019-08-13 ASSESSMENT — ACTIVITIES OF DAILY LIVING (ADL)
ADLS_ACUITY_SCORE: 16
ADLS_ACUITY_SCORE: 14
ADLS_ACUITY_SCORE: 14
ADLS_ACUITY_SCORE: 16
ADLS_ACUITY_SCORE: 14
ADLS_ACUITY_SCORE: 14

## 2019-08-13 NOTE — PLAN OF CARE
Cognitive Concerns/ Orientation : A&Ox4   BEHAVIOR & AGGRESSION TOOL COLOR: Green  CIWA SCORE: n/a  ABNL VS/O2: VSS on RA  MOBILITY: Up with 1 assist, GB & walker  PAIN MANAGMENT: PRN tramadol for abdominal pain 7/10; hepful  DIET: Low fat/na, fluid restriction 2L  BOWEL/BLADDER:  Loose BM x2 (pt on lactulose)  ABNL LAB/BG: K 3.1, replaced, recheck was 3.7  DRAIN/DEVICES: PIV SL  TELEMETRY RHYTHM: n/a  SKIN: Scabs/bruising, +2/3 BLE edema  TESTS/PROCEDURES:S/p Paracentesis 8/12/19- dressings CDI  D/C DAY/GOALS/PLACE: Pending improvement, possibly to TCU.  OTHER IMPORTANT INFO: GI following.

## 2019-08-13 NOTE — PLAN OF CARE
Discharge Planner OT   Patient plan for discharge: home  Current status: pt completed sit to stand from EOB SBA, SBA/CGA to amb with FWW to/from bathroom, toilet transfer with grab bars SBA, stood at sink for ADLS with SBA, SBA to complete transfer to chair. Pt fatigue  Barriers to return to prior living situation: Pt lives alone, decreased endurance for mobility and household chores, falls risk, cognition  Recommendations for discharge: TCU per plan established by the Occupational Therapist  Rationale for recommendations:   Patient is currently below IND baseline. Patient would benefit from ongoing OT to increase activity tolerance, cognition, strength, and IND with ADLs       Entered by: Krista Dolan 08/13/2019 12:00 PM

## 2019-08-13 NOTE — PROGRESS NOTES
[facility]  Hospitalist Progress Note   08/13/2019          Assessment and Plan:       Jim Richard is a 64 year old male with history of alcohol dependence, alcoholic liver disease, history of GI bleed secondary to esophageal varices admitted on 8/1/2019 with dark-colored stools.    Hematemesis with upper GI bleed.  History of  blood loss anemia.  History of duodenal ulcer and esophageal varices with portal hypertension.  Presented with dark-colored stools, hemoglobin 8.7 on admission.  Baseline hemoglobin around 10.  EGD 8/2-LA Grade C esophagitis, likely the cause of melena. No active bleeding seen.   S/p colonoscopy 8/8-3mm polyp removed. No active bleeding.   Received 1 unit blood transfusion with which hemoglobin has been stable greater than 9.  Continue Protonix twice daily and nadolol.    Decompensated alcoholic liver cirrhosis with ascites.  Was on octreotide and ceftriaxone earlier in the admission.  S/p ultrasound guided paracentesis on 8/2/19 with removal of 3.2 liters of fluid.  Ascitic fluid cultures from 8/2 no growth.  Repeat paracentesis 8/9-3.6 L fluid removed.  Repeat paracentesis 8/12 - 1.65L removed.  Received IV albumin infusion x 4 doses.  (8/10- 8/12)  Has been started on diuresis with intravenous Lasix 40 mg IV twice daily (8/11) to be continued.  Reassess volume status a.m.  Conitnue PTA lactulose -frequency reduced to once daily given diarrhea.  Hold PTA rifaximin given diarrhea.  Should input output monitoring, daily weights.  Limb elevation and Ace's.    History of alcohol dependence.  He has been off CIWA.  Continue thiamine multivitamin and folic acid supplements.  Monitor electrolytes were given history of alcohol use, on IV diuresis.  Psychiatry recommend chemical dependency evaluation, patient did refuse this and intentions of resuming AA.  Will not pursue CD commitment.  Followed by chemical dependency, declined assessment.  Encouraged cessation.    Pulmonary  infiltrates on imaging.  No fever.  No leukocytosis.  Procalcitonin 0.15.  proBNP 2831.  Chest x-ray new increased opacity in the right lung base suspicious for pneumonia.  Was on IV Rocephin which was discontinued.  Monitor fever curve, WBC count.  Repeat imaging in 4 to 6 weeks or earlier if symptomatic.    History of depression and anxiety.  Continue PTA Cymbalta and remeron    Hypertension.  Continue Spironolactone and Lasix as above.    Chronic low back pain.  Oral tramadol as needed as above, will need to cut start cutting back doses, discussed with patient would not be receiving any narcotic at time of discharge.  Tylenol for mild to moderate pain, limit to 2 2 g/day.  Warm compress PRN.  Minimize narcotic use as able to.    BPH.  Continue PTA Flomax.    History of COPD.  Continue PTA Breo Ellipta and albuterol  prn    History of hyperlipidemia.  HDL 15, LDL 70.  Discontinue PTA statin therapy.    Physical deconditioning due to chronic illness.  PT, OT assessment ongoing.  Recommend TCU.     Orders Placed This Encounter      Low Saturated Fat Na <2400 mg      DVT Prophylaxis: SCD, ambulate.  Code Status: DNR/DNI  Disposition: Expected discharge likely tomorrow pending clinical improvement.    Discussed with patient, bedside RN    Elena Chiang MD        Interval History:      Sitting up in chair.  Complains of diarrhea.  Abdominal pain improving.  Afebrile.  No chest pain or shortness of breath.  Minimal ambulation.  No nausea vomiting.    No headache or dizziness.       Physical Exam:        Physical Exam   Temp:  [97.9  F (36.6  C)-98.6  F (37  C)] 98.6  F (37  C)  Pulse:  [59] 59  Heart Rate:  [56-64] 64  Resp:  [16] 16  BP: (111-132)/(64-69) 115/68  SpO2:  [94 %] 94 %    Intake/Output Summary (Last 24 hours) at 8/12/2019 1410  Last data filed at 8/12/2019 1407  Gross per 24 hour   Intake 1086 ml   Output 6250 ml   Net -5164 ml     Admission Weight: 90.7 kg (200 lb)  Current Weight: 84.4 kg (186  lb)    PHYSICAL EXAM  GENERAL: Patient is in no distress. Alert and oriented.  LUNGS: Bilateral decreased breath sounds, no wheezing no crackles.  ABDOMEN: Soft, distended, + abdominal tenderness, bowel sounds heard   NEURO: moving all extremities, no focal weakness.  EXTREMITIES: 1+ pitting pedal edema. 2+ peripheral pulses.  SKIN: Warm, dry. No rash   PSYCHIATRY Cooperative       Medications:          DULoxetine  60 mg Oral Daily     fluticasone-vilanterol  1 puff Inhalation Daily     folic acid  1 mg Oral Daily     furosemide  40 mg Intravenous BID     [START ON 8/14/2019] lactulose  20 g Oral Daily     mirtazapine  30 mg Oral At Bedtime     multivitamin w/minerals  1 tablet Oral Daily     nadolol  40 mg Oral Daily     nicotine  1 patch Transdermal Daily     nicotine   Transdermal Q8H     nicotine   Transdermal Daily     pantoprazole  40 mg Oral QAM AC     sodium chloride (PF)  3 mL Intracatheter Q8H     spironolactone  25 mg Oral Daily     tamsulosin  0.4 mg Oral Daily     acetaminophen, albuterol, bisacodyl, glucose **OR** dextrose **OR** glucagon, HYDROmorphone, hypromellose-dextran, ketotifen, lidocaine 4%, lidocaine (buffered or not buffered), magnesium sulfate, - MEDICATION INSTRUCTIONS -, - MEDICATION INSTRUCTIONS -, miconazole, naloxone, ondansetron **OR** ondansetron, potassium chloride, potassium chloride with lidocaine, potassium chloride, potassium chloride, potassium chloride, potassium phosphate (KPHOS) in D5W IV, potassium phosphate (KPHOS) in D5W IV, potassium phosphate (KPHOS) in D5W IV, potassium phosphate (KPHOS) in D5W IV, prochlorperazine **OR** prochlorperazine **OR** prochlorperazine, QUEtiapine, sodium chloride (PF), traMADol         Data:      All new lab and imaging data was reviewed.

## 2019-08-13 NOTE — PLAN OF CARE
DATE & TIME: 8/13/19 0700- 1930                  Cognitive Concerns/ Orientation : A&Ox4 ,forgetful  BEHAVIOR & AGGRESSION TOOL COLOR: Green  CIWA SCORE: n/a  ABNL VS/O2: VSS on RA  MOBILITY: Up with 1 assist, GB & walker  PAIN MANAGMENT: PRN Tramadol x 1 for right lower abdomen pain  DIET: Low fat/na, fluid restriction 2L  BOWEL/BLADDER:  uses urinals,  Multiple loose stools, held lactulose  ABNL LAB/BG: K 3.5, Magnesium 1.2, replaced, recheck 2.0, Phos 3.1, Hgb 9.9, BNP 2,831  DRAIN/DEVICES: PIV SL  TELEMETRY RHYTHM: n/a  SKIN: Scabs/bruising, +2/3 BLE edema  TESTS/PROCEDURES:S/p Paracentesis 8/12/19- dressings CDI  D/C DAY/GOALS/PLACE: Pending progress,  THER IMPORTANT INFO: GI following., frequent urination, on IV lasix, Therapy recommending TCU., encouraged to elevate legs up on pillows, good appetite

## 2019-08-13 NOTE — PROGRESS NOTES
[facility]  Gastroenterology Progress Note     Jim Richard MRN# [id number]   YOB: 1955 Age: 64 year old          Assessment and Plan:    GI bleed- Hemoglobin stable no evidence of GI bleed.   EGD 8/2-LA Grade C esophagitis which was the likely the cause of melena. No active bleeding seen. S/p colonoscopy 8/8-3mm polyp removed. No active bleeding noted. Recommend to continue on Protonix twice daily and nadolol.    Decompensated alcoholic liver cirrhosis with ascites.  S/p ultrasound guided paracentesis on 8/2/19 with removal of 3.2 liters of fluid.  Repeat paracentesis 8/9-3.6 L fluid removed. Paracentesis this morning - 1.6 L fluid removed  Started on IV lasix 40 mg BID. Reports less abdominal discomfort this a.m.  Has had lactulose restarted after concern for decreased urine output and diarrhea. Patient has no evidence of HE. Having numerous watery bowel movements. Will decrease lactulose to once daily. Recommend to monitor for HE daily. Continue with multivitamin            Gastrointestinal hemorrhage, unspecified gastrointestinal hemorrhage type  Other ascites      Interval History:   no new complaints, doing well, denies chest pain, denies shortness of breath, pain is controlled, alert, oriented to person, place and time, has had a bowel movement in the last 24 hours and doing well; no cp, sob, n/v/d, or abd pain.              Review of Systems:   C: NEGATIVE for fever, chills, change in weight  E/M: NEGATIVE for ear, mouth and throat problems  R: NEGATIVE for significant cough or SOB  CV: NEGATIVE for chest pain, palpitations or peripheral edema             Medications:   I have reviewed this patient's current medications    DULoxetine  60 mg Oral Daily     fluticasone-vilanterol  1 puff Inhalation Daily     folic acid  1 mg Oral Daily     furosemide  40 mg Intravenous BID     lactulose  20 g Oral TID     mirtazapine  30 mg Oral At Bedtime     multivitamin w/minerals  1  tablet Oral Daily     nadolol  40 mg Oral Daily     nicotine  1 patch Transdermal Daily     nicotine   Transdermal Q8H     nicotine   Transdermal Daily     pantoprazole  40 mg Oral QAM AC     sodium chloride (PF)  3 mL Intracatheter Q8H     spironolactone  25 mg Oral Daily     tamsulosin  0.4 mg Oral Daily                  Physical Exam:   Vitals were reviewed  Vital Signs with Ranges  Temp:  [97.9  F (36.6  C)-98.6  F (37  C)] 98.6  F (37  C)  Pulse:  [59] 59  Heart Rate:  [56-64] 64  Resp:  [16] 16  BP: (111-132)/(64-69) 115/68  SpO2:  [94 %] 94 %  I/O last 3 completed shifts:  In: 1426 [P.O.:1420; I.V.:6]  Out: 3600 [Urine:3600]  Constitutional: healthy, alert and no distress   Cardiovascular: negative, PMI normal. No lifts, heaves, or thrills. RRR. No murmurs, clicks gallops or rub  Respiratory: negative, Percussion normal. Good diaphragmatic excursion. Lungs clear  Head: Normocephalic. No masses, lesions, tenderness or abnormalities  Neck: Neck supple. No adenopathy. Thyroid symmetric, normal size,, Carotids without bruits.  Abdomen: Abdomen soft, tender. BS normal. No masses, organomegaly, positive findings: tenderness mild generalized           Data:   I reviewed the patient's new clinical lab test results.   Recent Labs   Lab Test 08/13/19  0744 08/11/19  0936 08/10/19  0858  08/08/19  0720 08/07/19  0731 08/06/19  0739  08/01/19  1629  09/29/18  0550   WBC  --  4.3  --   --  4.6 4.8 5.2   < > 3.8*   < > 6.1   HGB 9.9* 9.8* 9.7*   < > 9.6* 10.2* 10.8*   < > 8.7*   < > 10.1*   MCV  --  92  --   --  91 92 91   < > 95   < > 74*   PLT  --  176  --   --  186 184 191   < > 155   < > 207   INR  --   --   --   --   --   --  1.31*  --  1.18*  --  1.10    < > = values in this interval not displayed.     Recent Labs   Lab Test 08/13/19  0744 08/12/19  1712 08/12/19  0823 08/11/19  0936   POTASSIUM 3.5 3.7 3.1* 3.5   CHLORIDE 101  --  101 109   CO2 25  --  26 24   BUN 15  --  15 13   ANIONGAP 7  --  7 5     Recent Labs    Lab Test 08/12/19  0823 08/11/19  0936 08/10/19  0858  02/13/19  2340  09/29/18  0550  03/21/18  1555  09/25/14  0510  06/12/12  0550   ALBUMIN 3.1* 2.7* 1.9*   < > 3.1*   < > 3.2*   < > 2.9*   < >  --    < >  --    BILITOTAL 2.2* 2.4* 2.8*   < > 0.4   < > 1.0   < > 0.9   < >  --    < >  --    ALT 24 24 26   < > 41   < > 60   < > 74*   < >  --    < >  --    AST 37 36 42   < > 89*   < > 140*   < > 136*   < >  --    < >  --    PROTEIN  --   --   --   --   --   --   --   --   --   --  Negative  --  Negative   LIPASE  --   --   --   --  536*  --  585*  --  316  --   --   --   --     < > = values in this interval not displayed.       I reviewed the patient's new imaging results.    All laboratory data reviewed  All imaging studies reviewed by me.    Gloria De Leon PA-C,  8/13/2019  [facility]  Office : [phone]  Cell: [phone] (Dr. Shaw)  Cell: [phone] (Gloria De Leon PA-C)

## 2019-08-14 ENCOUNTER — APPOINTMENT (OUTPATIENT)
Dept: PHYSICAL THERAPY | Facility: CLINIC | Age: 64
DRG: 369 | End: 2019-08-14
Payer: MEDICARE

## 2019-08-14 ENCOUNTER — APPOINTMENT (OUTPATIENT)
Dept: OCCUPATIONAL THERAPY | Facility: CLINIC | Age: 64
DRG: 369 | End: 2019-08-14
Payer: MEDICARE

## 2019-08-14 VITALS
BODY MASS INDEX: 29 KG/M2 | RESPIRATION RATE: 16 BRPM | WEIGHT: 185.19 LBS | DIASTOLIC BLOOD PRESSURE: 70 MMHG | SYSTOLIC BLOOD PRESSURE: 121 MMHG | OXYGEN SATURATION: 96 % | HEART RATE: 59 BPM | TEMPERATURE: 98.2 F

## 2019-08-14 PROCEDURE — 99239 HOSP IP/OBS DSCHRG MGMT >30: CPT | Performed by: HOSPITALIST

## 2019-08-14 PROCEDURE — 97110 THERAPEUTIC EXERCISES: CPT | Mod: GP | Performed by: PHYSICAL THERAPIST

## 2019-08-14 PROCEDURE — 97530 THERAPEUTIC ACTIVITIES: CPT | Mod: GP | Performed by: PHYSICAL THERAPIST

## 2019-08-14 PROCEDURE — 25000132 ZZH RX MED GY IP 250 OP 250 PS 637: Mod: GY | Performed by: PHYSICIAN ASSISTANT

## 2019-08-14 PROCEDURE — 97530 THERAPEUTIC ACTIVITIES: CPT | Mod: GO | Performed by: OCCUPATIONAL THERAPIST

## 2019-08-14 PROCEDURE — 25000132 ZZH RX MED GY IP 250 OP 250 PS 637: Mod: GY | Performed by: INTERNAL MEDICINE

## 2019-08-14 PROCEDURE — 25000128 H RX IP 250 OP 636: Performed by: INTERNAL MEDICINE

## 2019-08-14 PROCEDURE — 97116 GAIT TRAINING THERAPY: CPT | Mod: GP | Performed by: PHYSICAL THERAPIST

## 2019-08-14 PROCEDURE — 97535 SELF CARE MNGMENT TRAINING: CPT | Mod: GO | Performed by: OCCUPATIONAL THERAPIST

## 2019-08-14 RX ORDER — FOLIC ACID 1 MG/1
1 TABLET ORAL DAILY
Qty: 30 TABLET | Refills: 0 | Status: ON HOLD | OUTPATIENT
Start: 2019-08-15 | End: 2019-10-30

## 2019-08-14 RX ORDER — SPIRONOLACTONE 25 MG/1
50 TABLET ORAL DAILY
Qty: 30 TABLET | Refills: 0 | Status: ON HOLD | OUTPATIENT
Start: 2019-08-15 | End: 2020-01-15

## 2019-08-14 RX ORDER — NICOTINE 21 MG/24HR
1 PATCH, TRANSDERMAL 24 HOURS TRANSDERMAL DAILY
Qty: 7 PATCH | Refills: 0 | Status: ON HOLD | OUTPATIENT
Start: 2019-08-15 | End: 2020-01-15

## 2019-08-14 RX ORDER — LANOLIN ALCOHOL/MO/W.PET/CERES
100 CREAM (GRAM) TOPICAL DAILY
Qty: 30 TABLET | Refills: 0 | Status: ON HOLD | OUTPATIENT
Start: 2019-08-14 | End: 2019-10-30

## 2019-08-14 RX ORDER — SPIRONOLACTONE 25 MG/1
25 TABLET ORAL DAILY
Qty: 30 TABLET | Refills: 0 | Status: SHIPPED | OUTPATIENT
Start: 2019-08-15 | End: 2019-08-14

## 2019-08-14 RX ORDER — PANTOPRAZOLE SODIUM 40 MG/1
40 TABLET, DELAYED RELEASE ORAL
Qty: 30 TABLET | Refills: 0 | Status: ON HOLD | OUTPATIENT
Start: 2019-08-15 | End: 2019-10-30

## 2019-08-14 RX ORDER — PROPRANOLOL HYDROCHLORIDE 10 MG/1
10 TABLET ORAL 2 TIMES DAILY
Qty: 60 TABLET | Refills: 0 | Status: ON HOLD | OUTPATIENT
Start: 2019-08-14 | End: 2019-10-30

## 2019-08-14 RX ORDER — FUROSEMIDE 40 MG
40 TABLET ORAL DAILY
Qty: 30 TABLET | Refills: 0 | Status: ON HOLD | OUTPATIENT
Start: 2019-08-14 | End: 2019-10-30

## 2019-08-14 RX ADMIN — MULTIPLE VITAMINS W/ MINERALS TAB 1 TABLET: TAB at 08:30

## 2019-08-14 RX ADMIN — DULOXETINE HYDROCHLORIDE 60 MG: 60 CAPSULE, DELAYED RELEASE ORAL at 08:30

## 2019-08-14 RX ADMIN — NICOTINE 1 PATCH: 21 PATCH, EXTENDED RELEASE TRANSDERMAL at 08:27

## 2019-08-14 RX ADMIN — SPIRONOLACTONE 25 MG: 25 TABLET ORAL at 08:30

## 2019-08-14 RX ADMIN — FOLIC ACID 1 MG: 1 TABLET ORAL at 08:30

## 2019-08-14 RX ADMIN — FLUTICASONE FUROATE AND VILANTEROL TRIFENATATE 1 PUFF: 200; 25 POWDER RESPIRATORY (INHALATION) at 08:39

## 2019-08-14 RX ADMIN — FUROSEMIDE 40 MG: 10 INJECTION, SOLUTION INTRAVENOUS at 08:31

## 2019-08-14 RX ADMIN — TAMSULOSIN HYDROCHLORIDE 0.4 MG: 0.4 CAPSULE ORAL at 08:30

## 2019-08-14 RX ADMIN — KETOTIFEN FUMARATE 1 DROP: 0.35 SOLUTION/ DROPS OPHTHALMIC at 05:09

## 2019-08-14 RX ADMIN — NADOLOL 40 MG: 40 TABLET ORAL at 08:30

## 2019-08-14 RX ADMIN — PANTOPRAZOLE SODIUM 40 MG: 40 TABLET, DELAYED RELEASE ORAL at 06:55

## 2019-08-14 ASSESSMENT — ACTIVITIES OF DAILY LIVING (ADL)
ADLS_ACUITY_SCORE: 15
ADLS_ACUITY_SCORE: 14

## 2019-08-14 NOTE — DISCHARGE INSTRUCTIONS
A follow-up appointment has been made for you with Dr. Elias 8/19 at 2:20 pm at the [facility] at the [address] [place] [address].  Please call the clinic at [phone] should you need to change or cancel your appointment.  Please arrive 15 minutes early to your scheduled appointment and bring your photo ID, insurance card and co-payment.      Pt belongings in room: cellphone, wallet, clothing, sandals.

## 2019-08-14 NOTE — PLAN OF CARE
Cognitive Concerns/ Orientation : A&Ox4   BEHAVIOR & AGGRESSION TOOL COLOR: Green  CIWA SCORE: NA       ABNL VS/O2: VSS on RA  MOBILITY: SBA with walker, GB  PAIN MANAGMENT: Reports minimal abdominal pain, denies intervention  DIET: Low Saturated fat, Na<2400mg. Fluid restriction 2000ml  BOWEL/BLADDER: Continent; frequent urination. Loose BM x2  ABNL LAB/BG: Hgb 9.9 on 8/13, no signs of active bleeding  DRAIN/DEVICES: PIV SL  TELEMETRY RHYTHM: NA  SKIN: Bruised, abrasions, scabs, +1-2 BLE edema  TESTS/PROCEDURES:   D/C DAY/GOALS/PLACE: Pending; possibly today TCU vs Home  OTHER IMPORTANT INFO: LE elevated. Therapies recommending TCU, pt declining.

## 2019-08-14 NOTE — PROGRESS NOTES
[facility]  Hospitalist Progress Note   08/14/2019          Assessment and Plan:       Jim Richard is a 64 year old male with history of alcohol dependence, alcoholic liver disease, history of GI bleed secondary to esophageal varices admitted on 8/1/2019 with dark-colored stools.    Hematemesis with upper GI bleed.  History of  blood loss anemia.  History of duodenal ulcer and esophageal varices with portal hypertension.  Presented with dark-colored stools, hemoglobin 8.7 on admission.  Baseline hemoglobin around 10.  EGD 8/2-LA Grade C esophagitis, likely the cause of melena. No active bleeding seen.   S/p colonoscopy 8/8-3mm polyp removed. No active bleeding.   Received 1 unit blood transfusion with which hemoglobin has been stable greater than 9.  Continue Protonix twice daily and nadolol.    Decompensated alcoholic liver cirrhosis with ascites.  Was on octreotide and ceftriaxone earlier in the admission.  S/p ultrasound guided paracentesis on 8/2/19 with removal of 3.2 liters of fluid.  Ascitic fluid cultures from 8/2 no growth.  Repeat paracentesis 8/9-3.6 L fluid removed.  Repeat paracentesis 8/12 - 1.65L removed.  Received IV albumin infusion x 4 doses.  (8/10- 8/12)  Has been started on diuresis with intravenous Lasix 40 mg IV twice daily (8/11) to be continued.  Reassess volume status a.m.  Conitnue PTA lactulose -frequency reduced to once daily given diarrhea.  Hold PTA rifaximin given diarrhea.  Should input output monitoring, daily weights.  Limb elevation and Ace's.    History of alcohol dependence.  He has been off CIWA.  Continue thiamine multivitamin and folic acid supplements.  Monitor electrolytes were given history of alcohol use, on IV diuresis.  Psychiatry recommend chemical dependency evaluation, patient did refuse this and intentions of resuming AA.  Will not pursue CD commitment.  Followed by chemical dependency, declined assessment.  Encouraged cessation.    Pulmonary  infiltrates on imaging.  No fever.  No leukocytosis.  Procalcitonin 0.15.  proBNP 2831.  Chest x-ray new increased opacity in the right lung base suspicious for pneumonia.  Was on IV Rocephin which was discontinued.  Monitor fever curve, WBC count.  Repeat imaging in 4 to 6 weeks or earlier if symptomatic.    History of depression and anxiety.  Continue PTA Cymbalta and remeron    Hypertension.  Continue Spironolactone and Lasix as above.    Chronic low back pain.  Oral tramadol as needed as above, will need to cut start cutting back doses, discussed with patient would not be receiving any narcotic at time of discharge.  Tylenol for mild to moderate pain, limit to 2 2 g/day.  Warm compress PRN.  Minimize narcotic use as able to.    BPH.  Continue PTA Flomax.    History of COPD.  Continue PTA Breo Ellipta and albuterol  prn    History of hyperlipidemia.  HDL 15, LDL 70.  Discontinue PTA statin therapy.    Physical deconditioning due to chronic illness.  PT, OT assessment ongoing.  Recommend TCU.     Orders Placed This Encounter      Low Saturated Fat Na <2400 mg      DVT Prophylaxis: SCD, ambulate.  Code Status: DNR/DNI  Disposition: Expected discharge likely tomorrow pending clinical improvement.    Discussed with patient, bedside RN    Elena Chiang MD        Interval History:      Sitting up in chair.  Complains of diarrhea.  Abdominal pain improving.  Afebrile.  No chest pain or shortness of breath.  Minimal ambulation.  No nausea vomiting.    No headache or dizziness.       Physical Exam:        Physical Exam   Temp:  [97.8  F (36.6  C)-98.2  F (36.8  C)] 98.2  F (36.8  C)  Heart Rate:  [57-68] 68  Resp:  [16-19] 19  BP: (109-120)/(61-63) 120/61  SpO2:  [95 %-97 %] 95 %    Intake/Output Summary (Last 24 hours) at 8/12/2019 1410  Last data filed at 8/12/2019 1407  Gross per 24 hour   Intake 1086 ml   Output 6250 ml   Net -5164 ml     Admission Weight: 90.7 kg (200 lb)  Current Weight: 84.4 kg (186  lb)    PHYSICAL EXAM  GENERAL: Patient is in no distress. Alert and oriented.  LUNGS: Bilateral decreased breath sounds, no wheezing no crackles.  ABDOMEN: Soft, distended, + abdominal tenderness, bowel sounds heard   NEURO: moving all extremities, no focal weakness.  EXTREMITIES: 1+ pitting pedal edema. 2+ peripheral pulses.  SKIN: Warm, dry. No rash   PSYCHIATRY Cooperative       Medications:          DULoxetine  60 mg Oral Daily     fluticasone-vilanterol  1 puff Inhalation Daily     folic acid  1 mg Oral Daily     furosemide  40 mg Intravenous BID     lactulose  20 g Oral Daily     mirtazapine  30 mg Oral At Bedtime     multivitamin w/minerals  1 tablet Oral Daily     nadolol  40 mg Oral Daily     nicotine  1 patch Transdermal Daily     nicotine   Transdermal Q8H     nicotine   Transdermal Daily     pantoprazole  40 mg Oral QAM AC     sodium chloride (PF)  3 mL Intracatheter Q8H     spironolactone  25 mg Oral Daily     tamsulosin  0.4 mg Oral Daily     acetaminophen, albuterol, bisacodyl, glucose **OR** dextrose **OR** glucagon, HYDROmorphone, hypromellose-dextran, ketotifen, lidocaine 4%, lidocaine (buffered or not buffered), magnesium sulfate, - MEDICATION INSTRUCTIONS -, - MEDICATION INSTRUCTIONS -, miconazole, naloxone, ondansetron **OR** ondansetron, potassium chloride, potassium chloride with lidocaine, potassium chloride, potassium chloride, potassium chloride, potassium phosphate (KPHOS) in D5W IV, potassium phosphate (KPHOS) in D5W IV, potassium phosphate (KPHOS) in D5W IV, potassium phosphate (KPHOS) in D5W IV, prochlorperazine **OR** prochlorperazine **OR** prochlorperazine, QUEtiapine, sodium chloride (PF), traMADol         Data:      All new lab and imaging data was reviewed.

## 2019-08-14 NOTE — PLAN OF CARE
Discharge Planner OT   Patient plan for discharge: home  Current status: pt ambulating to BR with ww by himself upon OT arrival, pt completed toilet transfer with supervision, standard height toilet using sink for support. Pt completed SLUMS cognitive screen to determine impact on ADL/IADL's at home, pt scored 23/30 indicating minimal to moderate impairments with STM, sequencing, executive functioning and sequencing. Pt reports memory isn't that good but feels he's safe to return home and has been to 3 different TCU's in the past. Pt encouraged to go, but conts to decline. Pt agrees to home therapy and RN.  Barriers to return to prior living situation: impaired STM and executive functioning, decreased balance, fall risk  Recommendations for discharge: TCU  Rationale for recommendations: pt declines TCU, if home recommend A with all IADL's ie med mgmt, transportation, cooking, etc and home OT for ADLs and cognition and  home safety and RN for med mgmt.        Entered by: Adriana Valdez 08/14/2019 12:25 PM

## 2019-08-14 NOTE — PLAN OF CARE
Discharge Planner PT   Patient plan for discharge: home with home PT  Current status: Pt able to complete sit to stand with FWW and SBA, cues for safety, tends to leave FWW of to side, amb with FWW with steady gait 600+' but fatigues easily, stairs with SBA/CGA and B rails x 10 steps.  Pt instructed in there ex for LE with HO provided, order in for home walker  Barriers to return to prior living situation: increased weakness, decreased act minna, need for new AD and stairs at home.fall risk (fall hx)  Recommendations for discharge: TCU  Rationale for recommendations: Pt declines TCU, open to home PT for cont strengthening and conditioning to progress functional mob I and safety and to decrease fall risk        Entered by: JEROMY SHAH 08/14/2019 11:09 AM     Physical Therapy Discharge Summary    Reason for therapy discharge:    Discharged to home with home therapy.    Progress towards therapy goal(s). See goals on Care Plan in [facility] electronic health record for goal details.  Goals partially met.  Barriers to achieving goals:   discharge from facility.    Therapy recommendation(s):    Continued therapy is recommended.  Rationale/Recommendations:  see above.

## 2019-08-14 NOTE — CONSULTS
Care Transition RN    Met with patient to discuss discharge plans. Discussed therapy recommendations regarding TCU however patient declined stating he will be fine. He is open to having [facility]. Patient lives alone independent with ADLs however tires easily. Patient stating he would use a walker if needed. Patient able to manage his medications .  Writer raised concerns regarding OT eval scored 23/30 indicating minimal to moderate impairments with STM, sequencing, executive functioning and sequencing. Pt reports memory isn't that good but feels he's safe to return home and has been to 3 different TCU's in the past and prefers returning to his home.  Patient discharging today with Home Care Services. Appointment with Primary MD completed 8/19 at 2:20 pm

## 2019-08-14 NOTE — PLAN OF CARE
Discharge    Patient discharged to Home on w/c via cab  Care plan note  Cognitive Concerns/ Orientation : A&Ox4   BEHAVIOR & AGGRESSION TOOL COLOR: Green  CIWA SCORE: NA       ABNL VS/O2: VSS on RA  MOBILITY: SBA with walker, GB  PAIN MANAGMENT: Reports minimal abdominal pain, denies intervention  DIET: Low Saturated fat, Na<2400mg. Fluid restriction 2000ml  BOWEL/BLADDER: Continent; frequent urination. Loose BM x2  ABNL LAB/BG: Hgb 9.9 on 8/13, no signs of active bleeding  DRAIN/DEVICES: PIV x1 removed prior to discharge  TELEMETRY RHYTHM: NA  SKIN: Bruised, abrasions, scabs, +1-2 BLE edema  TESTS/PROCEDURES:   D/C DAY/GOALS/PLACE: Home   OTHER IMPORTANT INFO: LE elevated. Therapies recommending TCU, pt declined. Went through AVS, pt verbalized understanding. Applied Ace wrap and instructed to leave it on during days and off at night. Belongings packed and sent with the pt. D/t insurance, pt's discharge meds sent [facility].            Listed belongings gathered and returned to patient. Yes  Care Plan and Patient education resolved: Yes  Prescriptions if needed, hard copies sent with patient  NA  Home and hospital acquired medications returned to patient: Yes  Medication Bin checked and emptied on discharge Yes  Follow up appointment made for patient: Yes

## 2019-08-14 NOTE — PROGRESS NOTES
[facility]  Gastroenterology Progress Note     Jim Richard MRN# [id number]   YOB: 1955 Age: 64 year old          Assessment and Plan:   GI bleed- Hemoglobin stable no evidence of GI bleed.   EGD 8/2-LA Grade C esophagitis which was the likely the cause of melena. No active bleeding seen. S/p colonoscopy 8/8-3mm polyp removed. No active bleeding noted. Recommend to continue on Protonix twice daily and nadolol.     Decompensated alcoholic liver cirrhosis with ascites.  S/p ultrasound guided paracentesis on 8/2/19 with removal of 3.2 liters of fluid.  Repeat paracentesis 8/9-3.6 L fluid removed. Paracentesis 8/13 - 1.6 L fluid removed  Started on IV lasix 40 mg BID. Reports less abdominal discomfort this a.m.  Has had lactulose restarted and tolerating once daily lactulose. No need for rifaximin and this time. Patient has no evidence of HE. Will recommend to continue with lasix 40 mg as tolerating well and creatinine is normal. Discussed outpatient alcohol dependence treatment and counseling and importance to remain sober. Patient will need close outpatient GI care. WIll recommend patient be seen by [facility] in 1-2 weeks. We will contact the patient for scheduling. Continue with multivitamin.                 Interval History:   no new complaints, doing well, denies chest pain, denies shortness of breath, denies abdominal pain, pain is controlled, up and ambulating, alert, oriented to person, place and time, has had a bowel movement in the last 24 hours and doing well; no cp, sob, n/v/d, or abd pain.              Review of Systems:   C: NEGATIVE for fever, chills, change in weight  E/M: NEGATIVE for ear, mouth and throat problems  R: NEGATIVE for significant cough or SOB  CV: NEGATIVE for chest pain, palpitations or peripheral edema             Medications:   I have reviewed this patient's current medications    DULoxetine  60 mg Oral Daily     fluticasone-vilanterol  1 puff  Inhalation Daily     folic acid  1 mg Oral Daily     furosemide  40 mg Intravenous BID     lactulose  20 g Oral Daily     mirtazapine  30 mg Oral At Bedtime     multivitamin w/minerals  1 tablet Oral Daily     nadolol  40 mg Oral Daily     nicotine  1 patch Transdermal Daily     nicotine   Transdermal Q8H     nicotine   Transdermal Daily     pantoprazole  40 mg Oral QAM AC     sodium chloride (PF)  3 mL Intracatheter Q8H     spironolactone  25 mg Oral Daily     tamsulosin  0.4 mg Oral Daily                  Physical Exam:   Vitals were reviewed  Vital Signs with Ranges  Temp:  [97.8  F (36.6  C)-98.2  F (36.8  C)] 98.2  F (36.8  C)  Heart Rate:  [57-68] 68  Resp:  [16-19] 19  BP: (109-120)/(61-63) 120/61  SpO2:  [95 %-97 %] 95 %  I/O last 3 completed shifts:  In: 1020 [P.O.:1020]  Out: 1425 [Urine:1425]  Constitutional: healthy, alert and no distress   Cardiovascular: negative, PMI normal. No lifts, heaves, or thrills. RRR. No murmurs, clicks gallops or rub  Respiratory: negative, Percussion normal. Good diaphragmatic excursion. Lungs clear  Head: Normocephalic. No masses, lesions, tenderness or abnormalities  Neck: Neck supple. No adenopathy. Thyroid symmetric, normal size,, Carotids without bruits.  Abdomen: Abdomen soft, mildly distended non-tender. BS normal. No masses, organomegaly           Data:   I reviewed the patient's new clinical lab test results.   Recent Labs   Lab Test 08/13/19  0744 08/11/19  0936 08/10/19  0858  08/08/19  0720 08/07/19  0731 08/06/19  0739  08/01/19  1629  09/29/18  0550   WBC  --  4.3  --   --  4.6 4.8 5.2   < > 3.8*   < > 6.1   HGB 9.9* 9.8* 9.7*   < > 9.6* 10.2* 10.8*   < > 8.7*   < > 10.1*   MCV  --  92  --   --  91 92 91   < > 95   < > 74*   PLT  --  176  --   --  186 184 191   < > 155   < > 207   INR  --   --   --   --   --   --  1.31*  --  1.18*  --  1.10    < > = values in this interval not displayed.     Recent Labs   Lab Test 08/13/19  0744 08/12/19  1712 08/12/19  0880  08/11/19  0936   POTASSIUM 3.5 3.7 3.1* 3.5   CHLORIDE 101  --  101 109   CO2 25  --  26 24   BUN 15  --  15 13   ANIONGAP 7  --  7 5     Recent Labs   Lab Test 08/12/19  0823 08/11/19  0936 08/10/19  0858  02/13/19  2340  09/29/18  0550  03/21/18  1555  09/25/14  0510  06/12/12  0550   ALBUMIN 3.1* 2.7* 1.9*   < > 3.1*   < > 3.2*   < > 2.9*   < >  --    < >  --    BILITOTAL 2.2* 2.4* 2.8*   < > 0.4   < > 1.0   < > 0.9   < >  --    < >  --    ALT 24 24 26   < > 41   < > 60   < > 74*   < >  --    < >  --    AST 37 36 42   < > 89*   < > 140*   < > 136*   < >  --    < >  --    PROTEIN  --   --   --   --   --   --   --   --   --   --  Negative  --  Negative   LIPASE  --   --   --   --  536*  --  585*  --  316  --   --   --   --     < > = values in this interval not displayed.       I reviewed the patient's new imaging results.    All laboratory data reviewed  All imaging studies reviewed by me.    Gloria De Leon PA-C,  8/14/2019  [facility]  Office : [phone]  Cell: [phone] (Dr. Shaw)  Cell: [phone] (Gloria De Leon PA-C)

## 2019-08-14 NOTE — DISCHARGE SUMMARY
Discharge Summary  Hospitalist    Date of Admission:  8/1/2019  Date of Discharge:  8/14/2019  Discharging Provider: Elena Chiang MD    Primary Care Physician   FERNANDA BRANTLEY  Primary Care Provider Phone Number: [phone]  Primary Care Provider Fax Number: [phone]    PRINCIPAL DIAGNOSIS  Hematemesis with upper GI bleed.  History of  blood loss anemia.  History of duodenal ulcer and esophageal varices with portal hypertension.  Decompensated alcoholic liver cirrhosis with ascites.  History of alcohol dependence.  Pulmonary infiltrates on imaging.  Nicotine dependence.  Physical deconditioning due to chronic illness.      Past Medical History:   Diagnosis Date     Alcoholism (H) 1/14/2013    had treatment at [facility]     Anxiety      Coronary artery disease 09/09/2014    Nuclear stress test with slight fixed defect inferiorly, no ischemia     Depression     w/anxiety     Esophageal varices with bleeding 04/28/2018    6 varices banded on EGD     Heart attack (H)      Hepatomegaly     Fatty liver, chronic alcoholic     Hyperlipemia      Hypertension      JANIS on CPAP      Peripheral vascular disease (H)      PVD (peripheral vascular disease) (H)      SDH (subdural hematoma) (H) 04/26/2018    Right side, resolved spontaneously     Spinal stenosis        History of Present Illness   Jim Richard is an 64 year old male who presented with gi bleed     Hospital Course   Jim Richard is a 64 year old male with history of alcohol dependence, alcoholic liver disease, history of GI bleed secondary to esophageal varices admitted on 8/1/2019 with dark-colored stools.     Hematemesis with upper GI bleed.  History of  blood loss anemia.  History of duodenal ulcer and esophageal varices with portal hypertension.  Presented with dark-colored stools, hemoglobin 8.7 on admission.  Baseline hemoglobin around 10.  EGD 8/2-LA Grade C esophagitis, likely the cause of melena. No active bleeding seen.   S/p  colonoscopy 8/8-3mm polyp removed. No active bleeding.   Received 1 unit blood transfusion with which hemoglobin has been stable greater than 9.  Continue Protonix and nadolol switch to propranolol due to coverage at time of discharge.     Decompensated alcoholic liver cirrhosis with ascites.  Was on octreotide and ceftriaxone earlier in the admission.  S/p ultrasound guided paracentesis on 8/2/19 with removal of 3.2 liters of fluid.  Ascitic fluid cultures from 8/2 no growth.  Was followed by GI, Dr. Shaw repeat paracentesis 8/9-3.6 L fluid removed.  Repeat paracentesis 8/12 - 1.65L removed.  Received IV albumin infusion x 4 doses.  (8/10- 8/12)  Has been on diuresis with intravenous Lasix 40 mg IV twice daily (8/11 - 8/14, significant improvement in symptoms.  Discharged on oral Lasix 40 mg daily with Spironolactone 50 mg oral daily.  Conitnue PTA lactulose -frequency reduced to once daily given diarrhea.  Continued PTA rifaximin.  Monitor daily weights, limb elevation and Ace wraps.  Fluid restriction to 2000 mL.  Emphasize compliance with medical therapy.  Follow-up with gastroenterology as outpatient.    History of alcohol dependence.  He has been off CIWA.  Continue thiamine multivitamin and folic acid supplements.  Monitor electrolytes in 1 week.  Psychiatry recommend chemical dependency evaluation, patient did refuse this and intentions of resuming AA.  Will not pursue CD commitment.  Encouraged cessation.     Pulmonary infiltrates on imaging.  No fever.  No leukocytosis.  Procalcitonin 0.15.  proBNP 2831.  Chest x-ray new increased opacity in the right lung base suspicious for pneumonia.  Was on IV Rocephin which was discontinued.  Repeat imaging in 4 to 6 weeks or earlier if symptomatic.     History of depression and anxiety.  Insomnia.  Continue PTA Cymbalta and remeron  Continue PTA Seroquel as needed for anxiety and insomnia.  PTA trazodone on hold since hospitalization, would recommend to continue to  hold.  (Been off for more than 14 days now)    Hypertension.  Continue Spironolactone and Lasix as above.     Chronic low back pain.  Recommend as needed Tylenol, limit to less than 2 g/day.  Avoid narcotics.  Warm compress as needed.     BPH.  Continue PTA Flomax.     History of COPD.  Continue PTA Breo Ellipta and albuterol  prn     History of hyperlipidemia.  HDL 15, LDL 70.  Discontinued PTA statin therapy    Nicotine dependence.  Discussed on smoking cessation.  Nicotine patch at time of discharge.    Non severe malnutrition in the setting of acute illness.  Followed by dietitian.  Albumin 1.9 with confusion improved to 3.1.    Overweight with a BMI of 29.00.  Consider external modification with diet and exercise as able to.     Physical deconditioning due to chronic illness.  Mild cognitive impairment.  PT, OT assessment ongoing.  Recommend TCU.  [During hospitalization OT nehemias scored 23/30]  Despite discussing with patient multiple times declined TCU and would like to be discharged home.  Alert oriented x3 understands risks of leaving home alone.  Referral for home PT, home OT and RN requested.  Recommended no driving until PCP well.    Elena Chiang MD      Pending Results   Unresulted Labs Ordered in the Past 30 Days of this Admission     No orders found from 7/2/2019 to 8/2/2019.             Physical Exam   Vitals:    08/11/19 0554 08/12/19 0650 08/13/19 0700   Weight: 90.1 kg (198 lb 10.2 oz) 84.4 kg (186 lb) 84 kg (185 lb 3 oz)     Vital Signs with Ranges  Temp:  [98  F (36.7  C)-98.2  F (36.8  C)] 98.2  F (36.8  C)  Heart Rate:  [53-68] 53  Resp:  [16-19] 16  BP: (115-121)/(61-70) 121/70  SpO2:  [95 %-97 %] 96 %  I/O last 3 completed shifts:  In: 540 [P.O.:540]  Out: 975 [Urine:975]  PHYSICAL EXAM  GENERAL: Patient is in no distress. Alert and oriented.  LUNGS: Bilateral decreased breath sounds, no wheezing no crackles.  ABDOMEN: Soft, distended, no abdominal tenderness, bowel sounds heard   NEURO:  moving all extremities, no focal weakness.  EXTREMITIES: trace pedal edema. 2+ peripheral pulses.  SKIN: Warm, dry. No rash   PSYCHIATRY Cooperative    )Consultations This Hospital Stay   GASTROENTEROLOGY IP CONSULT  CHEMICAL DEPENDENCY IP CONSULT  PSYCHIATRY IP CONSULT  PHYSICAL THERAPY ADULT IP CONSULT  OCCUPATIONAL THERAPY ADULT IP CONSULT  CARE TRANSITION RN/SW IP CONSULT  SOCIAL WORK IP CONSULT  CARE TRANSITION RN/SW IP CONSULT    Time Spent on this Encounter   I Rodneyalexandrea Alissadominic, personally saw the patient today and spent greater than 30 minutes discharging this patient.  Discussed with patient, bedside RN, care coordinator and .    Discharge Orders      MED THERAPY MANAGE REFERRAL      Home care nursing referral      Home Care PT Referral for Hospital Discharge      Home Care OT Referral for Hospital Discharge      Follow-up and recommended labs and tests     Appointment with Dr BRANTLEY 8/19 at 2:20 pm     Reason for your hospital stay    Had EGD and colonoscopy, evaluated by gastroenterology and started on Protonix.  Also had ascites, underwent paracentesis and started on diuresis with Lasix.  Evaluated by psychiatry for alcohol dependence.     Follow-up and recommended labs and tests     Follow-up with primary care provider in less than 1 week or earlier if symptomatic for post hospital follow-up.  Monitor complete metabolic panel and hemoglobin level on PCP visit.  Age-appropriate health maintenance on PCP visit.  Follow-up with gastroenterology Dr. Shaw in 1 to 2 weeks.     Activity    Your activity upon discharge: activity as tolerated and no driving until PCP eval.     When to contact your care team    Contact your medical provider if you have any blood in the stool or abdominal discomfort.     Discharge Instructions    Emphasized compliance with medical therapy,lactulose at least once a day for goal of 2-3 bowel movements a day.  Monitor daily weights if able to.  Limb elevation, Ace  wraps.  Fluid restriction to 2000 mL/day.  Follow-up with outpatient AA for treatment.  Strongly consider abstinence from alcohol and nicotine.  Avoid narcotics, benzodiazepines, any sedating medication.  Avoid excessive Tylenol intake.  Emphasize compliance with lactulose at least once a day for goal of 2-3 bowel movements a daEmphasized compliance with medical therapy.  Monitor daily weights if able to.  Limb elevation, Ace wraps.  Fluid restriction to 2000 mL/day.  Follow-up with outpatient AA for treatment.  Strongly consider abstinence from alcohol and nicotine.  Avoid narcotics, benzodiazepines, any sedating medication.  Avoid excessive Tylenol intake.     MD face to face encounter    Documentation of Face to Face and Certification for Home Health Services    I certify that patient: Jim Richard is under my care and that I, or a nurse practitioner or physician's assistant working with me, had a face-to-face encounter that meets the physician face-to-face encounter requirements with this patient on: 8/14/2019.    This encounter with the patient was in whole, or in part, for the following medical condition, which is the primary reason for home health care: Alcoholic liver cirrhosis with ascites, upper GI bleed, physical deconditioning..    I certify that, based on my findings, the following services are medically necessary home health services: Nursing, Occupational Therapy and Physical Therapy.    My clinical findings support the need for the above services because: Nurse is needed: To assess weight after changes in medications or other medical regimen.., Occupational Therapy Services are needed to assess and treat cognitive ability and address ADL safety due to alcoholic liver cirrhosis with ascites, declined chemical dependency and TCU,   Discharge from hospital for follow-up for few weeks.. and Physical Therapy Services are needed to assess and treat the following functional impairments: Physical  deconditioning due to hospitalization for 13 days.    Further, I certify that my clinical findings support that this patient is homebound (i.e. absences from home require considerable and taxing effort and are for medical reasons or [employer] services or infrequently or of short duration when for other reasons) because: Requires assistance of another person and no driving due to physical deconditioning and hospitalization for 13 days.  Declining TCU.    Based on the above findings. I certify that this patient is confined to the home and needs intermittent skilled nursing care, physical therapy and/or speech therapy.  The patient is under my care, and I have initiated the establishment of the plan of care.  This patient will be followed by a physician who will periodically review the plan of care.  Physician/Provider to provide follow up care: Fernanda Elias    Attending hospital physician (the Medicare certified [place] provider): Elena Chiang  Physician Signature: See electronic signature associated with these discharge orders.  Date: 8/14/2019     Diet    Follow this diet upon discharge: Orders Placed This Encounter      Fluid restriction 2000 ML FLUID      Snacks/Supplements Adult: Boost Plus; Between Meals      Fluid restriction 2000 ML FLUID      Low Saturated Fat Na <2400 mg       Discharge Medications   Current Discharge Medication List      START taking these medications    Details   folic acid (FOLVITE) 1 MG tablet Take 1 tablet (1 mg) by mouth daily  Qty: 30 tablet, Refills: 0    Comments: Future refills by PCP Dr. FERNANDA ELIAS with phone number [phone].  Associated Diagnoses: Alcoholic cirrhosis of liver with ascites (H)      furosemide (LASIX) 40 MG tablet Take 1 tablet (40 mg) by mouth daily  Qty: 30 tablet, Refills: 0    Comments: Future refills by PCP Dr. FERNANDA ELIAS with phone number [phone].  Associated Diagnoses: Alcoholic cirrhosis of liver with ascites (H)       lactulose (CEPHULAC) 20 GM packet Take 1 packet (20 g) by mouth daily  Qty: 10 packet, Refills: 0    Associated Diagnoses: Alcoholic cirrhosis of liver with ascites (H)      nicotine (NICODERM CQ) 21 MG/24HR 24 hr patch Place 1 patch onto the skin daily  Qty: 7 patch, Refills: 0    Comments: Future refills by PCP Dr. FERNANDA BRANTLEY with phone number [phone].  Associated Diagnoses: Alcoholic cirrhosis of liver with ascites (H)      order for DME Equipment being ordered: Walker Wheels () and Walker ()  Treatment Diagnosis: difficulty walking  Qty: 1 each, Refills: 0    Associated Diagnoses: Spinal stenosis, lumbar region, with neurogenic claudication      pantoprazole (PROTONIX) 40 MG EC tablet Take 1 tablet (40 mg) by mouth every morning (before breakfast)  Qty: 30 tablet, Refills: 0    Comments: Future refills by PCP Dr. FERNANDA BRANTLEY with phone number [phone].  Associated Diagnoses: Alcoholic cirrhosis of liver with ascites (H)      propranolol (INDERAL) 10 MG tablet Take 1 tablet (10 mg) by mouth 2 times daily  Qty: 60 tablet, Refills: 0    Comments: Future refills by PCP Dr. FERNANDA BRANTLEY with phone number [phone].  Associated Diagnoses: Alcoholic cirrhosis of liver with ascites (H)      spironolactone (ALDACTONE) 25 MG tablet Take 2 tablets (50 mg) by mouth daily  Qty: 30 tablet, Refills: 0    Associated Diagnoses: Alcoholic cirrhosis of liver with ascites (H)      vitamin B1 (THIAMINE) 100 MG tablet Take 1 tablet (100 mg) by mouth daily  Qty: 30 tablet, Refills: 0    Comments: Future refills by PCP Dr. FERNANDA BRANTLEY with phone number [phone].  Associated Diagnoses: Alcohol withdrawal syndrome with complication (H)         CONTINUE these medications which have NOT CHANGED    Details   albuterol (PROAIR HFA/PROVENTIL HFA/VENTOLIN HFA) 108 (90 BASE) MCG/ACT Inhaler Inhale 2 puffs into the lungs every 6 hours as needed       DULoxetine (CYMBALTA) 60 MG EC  capsule Take 1 capsule (60 mg) by mouth daily  Qty: 30 capsule, Refills: 0    Associated Diagnoses: 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)      fluticasone-vilanterol (BREO ELLIPTA) 200-25 MCG/INH oral inhaler Inhale 1 puff into the lungs daily       mirtazapine (REMERON) 30 MG tablet Take 30 mg by mouth At Bedtime      multivitamin, therapeutic with minerals (THERA-VIT-M) TABS tablet Take 1 tablet by mouth daily  Qty: 30 each, Refills: 0    Associated Diagnoses: Alcoholic intoxication without complication (H)      QUEtiapine (SEROQUEL) 50 MG tablet Take 50 mg by mouth 3 times daily as needed (anxiety/sleep)      tamsulosin (FLOMAX) 0.4 MG capsule Take 0.4 mg by mouth daily         STOP taking these medications       atorvastatin (LIPITOR) 10 MG tablet Comments:   Reason for Stopping:         hydrOXYzine (ATARAX) 25 MG tablet Comments:   Reason for Stopping:         omeprazole (PRILOSEC) 40 MG DR capsule Comments:   Reason for Stopping:         spironolactone-HCTZ (ALDACTAZIDE) 25-25 MG tablet Comments:   Reason for Stopping:         traZODone (DESYREL) 100 MG tablet Comments:   Reason for Stopping:             Allergies   No Known Allergies    Discharge Disposition   Discharged to home  Condition at discharge: Stable    DATA  Most Recent 3 CBC's:  Recent Labs   Lab Test 08/13/19  0744 08/11/19  0936 08/10/19  0858  08/08/19  0720 08/07/19  0731   WBC  --  4.3  --   --  4.6 4.8   HGB 9.9* 9.8* 9.7*   < > 9.6* 10.2*   MCV  --  92  --   --  91 92   PLT  --  176  --   --  186 184    < > = values in this interval not displayed.      Most Recent 3 BMP's:  Recent Labs   Lab Test 08/13/19  0744 08/12/19  1712 08/12/19  0823 08/11/19  0936     --  134 138   POTASSIUM 3.5 3.7 3.1* 3.5   CHLORIDE 101  --  101 109   CO2 25  --  26 24   BUN 15  --  15 13   CR 0.96  --  0.92 0.84   ANIONGAP 7  --  7 5   KEV 8.3*  --  8.1* 8.0*   GLC 99  --  116* 102*     Most Recent 2 LFT's:  Recent Labs   Lab  Test 08/12/19  0823 08/11/19  0936   AST 37 36   ALT 24 24   ALKPHOS 232* 232*   BILITOTAL 2.2* 2.4*     Most Recent INR's and Anticoagulation Dosing History:  Anticoagulation Dose History     Recent Dosing and Labs Latest Ref Rng & Units 4/26/2018 4/29/2018 4/30/2018 5/21/2018 9/29/2018 8/1/2019 8/6/2019    INR 0.86 - 1.14 1.52(H) 1.21(H) 1.13 1.08 1.10 1.18(H) 1.31(H)        Most Recent 3 Troponin's:  Recent Labs   Lab Test 04/26/18  1940   TROPI <0.015     Most Recent Cholesterol Panel:  Recent Labs   Lab Test 08/13/19  0744   CHOL 113   LDL 70   HDL 15*   TRIG 140     Most Recent 6 Bacteria Isolates From Any Culture (See EPIC Reports for Culture Details):  Recent Labs   Lab Test 08/02/19  0840 09/29/18  1359 09/29/18  1352 05/02/18  1535 05/02/18  1530 10/08/16  1150   CULT No growth No growth No growth No growth No growth Moderate growth Normal trevin     Most Recent TSH, T4 and A1c Labs:  Recent Labs   Lab Test 03/22/18  0430 02/08/17  1640   TSH  --  1.51   A1C Canceled, Test credited  --      Results for orders placed or performed during the hospital encounter of 08/01/19   XR Chest 2 Views    Narrative    CHEST TWO VIEW   8/1/2019 5:54 PM     HISTORY: Chest wall pain after fall.    COMPARISON: 1/29/2019, 9/29/2018 and 3/21/2018.    FINDINGS:  Linear atelectasis versus scarring in left lateral base is  noted. Lungs are otherwise grossly clear. No pneumothorax or  significant pleural fluid collection. Heart size is upper normal.  Subtle calcific densities project over the right shoulder could  represent calcific tendinitis of the supraspinatus tendon. These were  also previously noted. Chronic right posterolateral fourth, fifth,  sixth, seventh and possibly eighth rib fractures are again noted.  There is a chronic posterior lateral left seventh rib fracture which  is also again seen. No other changes. No definite new fractures are  seen. Refracture at the chronic rib fracture sites is difficult to  exclude.       Impression    IMPRESSION:   1. Multiple chronic fractures bilateral ribs are again noted.  Refracture through the old rib fracture sites is difficult to exclude  although no definite new fracture is seen.  2. Left basilar atelectasis versus scarring.  3. No other evidence of acute cardiopulmonary disease is seen.    ANIL GARRETT MD   US Paracentesis    Narrative    ULTRASOUND PARACENTESIS 8/2/2019 9:03 AM     HISTORY:  Ascites.    FINDINGS: Ultrasound was used to evaluate for the presence and best  approach for paracentesis. Written and oral informed consent was  obtained. A pause for the cause procedure to verify the correct  patient and correct procedure. The skin overlying the right lower  quadrant was prepped and draped in the usual sterile fashion. The  subcutaneous tissues were anesthetized with 10 mL 1% lidocaine. A  catheter was advanced into the peritoneal space and 3.2 L of  straw  colored fluid was drained. There were no immediate complications.  Ultrasound images were permanently stored.  Patient left the  ultrasound suite in satisfactory condition.      Impression    IMPRESSION: Technically successful paracentesis without immediate  complications.    TACO LINDA MD   CT Abdomen Pelvis w Contrast    Narrative    CT ABDOMEN AND PELVIS WITH CONTRAST August 3, 2019 10:57 AM     HISTORY: Epigastric abdominal mass noted on physical exam.    COMPARISON: September 29, 2018.    TECHNIQUE: Volumetric helical acquisition of CT images from the lung  bases through the symphysis pubis after the administration of 92mL  Isovue-370  intravenous contrast. Radiation dose for this scan was  reduced using automated exposure control, adjustment of the mA and/or  kV according to patient size, or iterative reconstruction technique.    FINDINGS: There is an air distended transverse colon, of unclear  etiology. No small bowel dilatation. Marked hepatomegaly.  Heterogeneous enhancement of the liver and portal systemic  collaterals  compatible with cirrhosis and portal hypertension. Extensive  gastroesophageal varices. Splenomegaly at 14.9 cm. Distended  gallbladder. Mild ascites. No hydronephrosis. Adrenal glands, kidneys,  and spleen demonstrate no worrisome focal lesion. There are extensive  atherosclerotic changes of the visualized aorta and its branches.  There is no evidence of aortic dissection or aneurysm. Trace dependent  atelectasis and trace pleural fluid bilaterally.      Impression    IMPRESSION:  1. Marked hepatomegaly, heterogeneous liver compatible with cirrhosis.  2. Extensive portal systemic collaterals compatible with portal  hypertension including extensive gastroesophageal varices.  3. Distended gallbladder of uncertain etiology. No biliary dilatation  demonstrated.  4. Mild ascites.  5. Air filled transverse colon, felt unlikely to represent colonic  obstruction but this cannot be entirely excluded.     TACO LINDA MD   XR Chest 2 Views    Narrative    CHEST TWO VIEWS      8/8/2019 6:38 PM     HISTORY: Shortness of breath, cirrhosis.    COMPARISON: None.      Impression    IMPRESSION: AP film accentuates heart size which is likely upper  normal. Pulmonary vasculature does not appear distended.    New increased opacity in the right lung base suspicious for pneumonia.  Mild left lung base atelectasis or infiltrate as well. On the lateral  view there is a small pleural effusion likely on the right.     TINO GARCIA MD   US Paracentesis    Narrative    EXAM: US PARACENTESIS  8/9/2019 2:28 PM       HISTORY:HIGH VOLUME paracentesis with or without diagnostic fluid  analysis with labs to be drawn if ordered. Total paracentesis volume  as much as possible. Ascites.      PROCEDURE:  Written informed consent was obtained from the patient  prior to the procedure. The risks and benefits including bleeding,  infection and abdominal organ injury were discussed and the patient  wished to continue. Initial ultrasound images  demonstrated a large  right lower quadrant fluid collection. The skin overlying this  collection was marked, prepped, draped and anesthetized in usual  sterile fashion utilizing 10 mL 1% lidocaine. The paracentesis  catheter was then placed into the peritoneal fluid collection with  return of 3600 mL mL of straw-colored fluid. Patient tolerated the  procedure well. The patient will receive intravenous albumin on the  usual sliding scale as needed per protocol.      US guidance was utilized to enter the peritoneal space for  paracentesis with permanent image recoding.      Impression    IMPRESSION:  Ultrasound-guided paracentesis.      PORSHA BOB PA-C   XR Chest 1 View    Narrative    CHEST ONE VIEW   8/11/2019 8:55 AM     HISTORY: Followup bilateral infiltrates.    COMPARISON: Chest x-ray 8/8/2019.      Impression    IMPRESSION: Portable view of the chest is performed. Mild pleural  thickening lateral right lower hemithorax is noted along with old  healed right lower rib fractures. Heart is normal in size. Right mid  and lower lung infiltrate is unchanged. Retrocardiac infiltrate is  also stable. No pneumothorax or obvious pleural effusions.    ORIN PARSONS MD   US Paracentesis    Narrative    ULTRASOUND PARACENTESIS  8/12/2019 9:43 AM     HISTORY:  Cirrhosis with recurrent ascites.    FINDINGS: Ultrasound was used to evaluate for the presence and best  approach for paracentesis. Written and oral informed consent was  obtained. A pause for the cause procedure to verify the correct  patient and correct procedure. The skin overlying the left lower  quadrant was prepped and draped in the usual sterile fashion. The  subcutaneous tissues were anesthetized with 10 mL 1% lidocaine. A  catheter was advanced into the peritoneal space and 1.65 L of  straw  colored fluid was drained. There were no immediate complications.  Ultrasound images were permanently stored.  Patient left the  ultrasound suite in satisfactory  condition.      Impression    IMPRESSION: Technically successful paracentesis without immediate  complications.    TACO LINDA MD

## 2019-08-14 NOTE — PLAN OF CARE
Cognitive Concerns/ Orientation : A&Ox4   BEHAVIOR & AGGRESSION TOOL COLOR: Green  CIWA SCORE: NA   ABNL VS/O2: VSS on RA  MOBILITY: SBA with walker, GB  PAIN MANAGMENT: Reports minimal abdominal pain, denies intervention  DIET: Low Saturated fat, Na<2400mg. Fluid restriction 2000ml  BOWEL/BLADDER: Continent; frequent urination. No BM overnight.  ABNL LAB/BG: Hgb 9.9  DRAIN/DEVICES: PIV  TELEMETRY RHYTHM: NA  SKIN: Bruised, abrasions, scabs  TESTS/PROCEDURES:   D/C DAY/GOALS/PLACE: Pending; possibly today  OTHER IMPORTANT INFO: LE elevated.

## 2019-08-15 NOTE — PLAN OF CARE
Occupational Therapy Discharge Summary    Reason for therapy discharge:    Discharged to home with home therapy.    Progress towards therapy goal(s). See goals on Care Plan in [facility] electronic health record for goal details.  Goals not met.  Barriers to achieving goals:   discharge from facility.    Therapy recommendation(s):    Continued therapy is recommended.  Rationale/Recommendations:  To maximize I in ADL/IADL.

## 2019-08-15 NOTE — PROGRESS NOTES
[facility]  Due to a high volume of referrals [facility] has requested this patient's home care episode be transferred to their deferral partner [facility] [phone]. Care team and patient notified.     ADDENDUM:  [facility] [phone] will instead be seeing this patient. They have accepted this referral and will start 8/16/19.  Thank you.

## 2019-09-25 ENCOUNTER — APPOINTMENT (OUTPATIENT)
Dept: ULTRASOUND IMAGING | Facility: CLINIC | Age: 64
DRG: 896 | End: 2019-09-25
Attending: INTERNAL MEDICINE
Payer: MEDICARE

## 2019-09-25 ENCOUNTER — HOSPITAL ENCOUNTER (INPATIENT)
Facility: CLINIC | Age: 64
LOS: 6 days | Discharge: HOME | DRG: 896 | End: 2019-10-01
Attending: EMERGENCY MEDICINE | Admitting: HOSPITALIST
Payer: MEDICARE

## 2019-09-25 DIAGNOSIS — K70.31 ALCOHOLIC CIRRHOSIS OF LIVER WITH ASCITES (H): ICD-10-CM

## 2019-09-25 DIAGNOSIS — D64.9 ANEMIA, UNSPECIFIED TYPE: ICD-10-CM

## 2019-09-25 DIAGNOSIS — K92.1 MELENA: ICD-10-CM

## 2019-09-25 DIAGNOSIS — K70.10 ALCOHOLIC HEPATITIS WITHOUT ASCITES (H): Primary | ICD-10-CM

## 2019-09-25 DIAGNOSIS — F10.930 ALCOHOL WITHDRAWAL, UNCOMPLICATED (H): ICD-10-CM

## 2019-09-25 LAB
ABO + RH BLD: NORMAL
ABO + RH BLD: NORMAL
ALBUMIN SERPL-MCNC: 2.5 G/DL (ref 3.4–5)
ALP SERPL-CCNC: 268 U/L (ref 40–150)
ALT SERPL W P-5'-P-CCNC: 34 U/L (ref 0–70)
ANION GAP SERPL CALCULATED.3IONS-SCNC: 7 MMOL/L (ref 3–14)
AST SERPL W P-5'-P-CCNC: 147 U/L (ref 0–45)
BASOPHILS # BLD AUTO: 0 10E9/L (ref 0–0.2)
BASOPHILS NFR BLD AUTO: 0.7 %
BILIRUB SERPL-MCNC: 2.4 MG/DL (ref 0.2–1.3)
BLD GP AB SCN SERPL QL: NORMAL
BLOOD BANK CMNT PATIENT-IMP: NORMAL
BUN SERPL-MCNC: 17 MG/DL (ref 7–30)
CALCIUM SERPL-MCNC: 7.5 MG/DL (ref 8.5–10.1)
CHLORIDE SERPL-SCNC: 101 MMOL/L (ref 94–109)
CO2 SERPL-SCNC: 28 MMOL/L (ref 20–32)
CREAT SERPL-MCNC: 0.89 MG/DL (ref 0.66–1.25)
DIFFERENTIAL METHOD BLD: ABNORMAL
EOSINOPHIL # BLD AUTO: 0 10E9/L (ref 0–0.7)
EOSINOPHIL NFR BLD AUTO: 0.7 %
ERYTHROCYTE [DISTWIDTH] IN BLOOD BY AUTOMATED COUNT: 15.5 % (ref 10–15)
ERYTHROCYTE [DISTWIDTH] IN BLOOD BY AUTOMATED COUNT: 15.7 % (ref 10–15)
ETHANOL SERPL-MCNC: <0.01 G/DL
GFR SERPL CREATININE-BSD FRML MDRD: 90 ML/MIN/{1.73_M2}
GLUCOSE SERPL-MCNC: 92 MG/DL (ref 70–99)
HCT VFR BLD AUTO: 25.4 % (ref 40–53)
HCT VFR BLD AUTO: 27.1 % (ref 40–53)
HGB BLD-MCNC: 8.4 G/DL (ref 13.3–17.7)
HGB BLD-MCNC: 8.7 G/DL (ref 13.3–17.7)
HGB BLD-MCNC: 8.9 G/DL (ref 13.3–17.7)
IMM GRANULOCYTES # BLD: 0 10E9/L (ref 0–0.4)
IMM GRANULOCYTES NFR BLD: 0 %
INR PPP: 1.35 (ref 0.86–1.14)
INTERPRETATION ECG - MUSE: NORMAL
LACTATE BLD-SCNC: 2 MMOL/L (ref 0.7–2)
LYMPHOCYTES # BLD AUTO: 0.4 10E9/L (ref 0.8–5.3)
LYMPHOCYTES NFR BLD AUTO: 14 %
MAGNESIUM SERPL-MCNC: 0.9 MG/DL (ref 1.6–2.3)
MAGNESIUM SERPL-MCNC: 1.4 MG/DL (ref 1.6–2.3)
MAGNESIUM SERPL-MCNC: 2.1 MG/DL (ref 1.6–2.3)
MCH RBC QN AUTO: 31.9 PG (ref 26.5–33)
MCH RBC QN AUTO: 32.7 PG (ref 26.5–33)
MCHC RBC AUTO-ENTMCNC: 32.8 G/DL (ref 31.5–36.5)
MCHC RBC AUTO-ENTMCNC: 34.3 G/DL (ref 31.5–36.5)
MCV RBC AUTO: 96 FL (ref 78–100)
MCV RBC AUTO: 97 FL (ref 78–100)
MONOCYTES # BLD AUTO: 0.5 10E9/L (ref 0–1.3)
MONOCYTES NFR BLD AUTO: 15.7 %
NEUTROPHILS # BLD AUTO: 2.1 10E9/L (ref 1.6–8.3)
NEUTROPHILS NFR BLD AUTO: 68.9 %
PLATELET # BLD AUTO: 59 10E9/L (ref 150–450)
PLATELET # BLD AUTO: 67 10E9/L (ref 150–450)
POTASSIUM SERPL-SCNC: 3 MMOL/L (ref 3.4–5.3)
POTASSIUM SERPL-SCNC: 3 MMOL/L (ref 3.4–5.3)
PROT SERPL-MCNC: 6.1 G/DL (ref 6.8–8.8)
RBC # BLD AUTO: 2.66 10E12/L (ref 4.4–5.9)
RBC # BLD AUTO: 2.79 10E12/L (ref 4.4–5.9)
SODIUM SERPL-SCNC: 136 MMOL/L (ref 133–144)
SPECIMEN EXP DATE BLD: NORMAL
UPPER GI ENDOSCOPY: NORMAL
WBC # BLD AUTO: 3 10E9/L (ref 4–11)
WBC # BLD AUTO: 3.1 10E9/L (ref 4–11)

## 2019-09-25 PROCEDURE — 25000132 ZZH RX MED GY IP 250 OP 250 PS 637: Mod: GY | Performed by: HOSPITALIST

## 2019-09-25 PROCEDURE — 86850 RBC ANTIBODY SCREEN: CPT | Performed by: EMERGENCY MEDICINE

## 2019-09-25 PROCEDURE — 93005 ELECTROCARDIOGRAM TRACING: CPT

## 2019-09-25 PROCEDURE — 86901 BLOOD TYPING SEROLOGIC RH(D): CPT | Performed by: EMERGENCY MEDICINE

## 2019-09-25 PROCEDURE — 99285 EMERGENCY DEPT VISIT HI MDM: CPT

## 2019-09-25 PROCEDURE — 25000128 H RX IP 250 OP 636: Performed by: EMERGENCY MEDICINE

## 2019-09-25 PROCEDURE — 25000128 H RX IP 250 OP 636: Performed by: HOSPITALIST

## 2019-09-25 PROCEDURE — 85027 COMPLETE CBC AUTOMATED: CPT | Performed by: HOSPITALIST

## 2019-09-25 PROCEDURE — 27210190 US PARACENTESIS

## 2019-09-25 PROCEDURE — 83735 ASSAY OF MAGNESIUM: CPT | Performed by: EMERGENCY MEDICINE

## 2019-09-25 PROCEDURE — 0W9G3ZZ DRAINAGE OF PERITONEAL CAVITY, PERCUTANEOUS APPROACH: ICD-10-PCS | Performed by: PHYSICIAN ASSISTANT

## 2019-09-25 PROCEDURE — 85025 COMPLETE CBC W/AUTO DIFF WBC: CPT | Performed by: EMERGENCY MEDICINE

## 2019-09-25 PROCEDURE — 12000000 ZZH R&B MED SURG/OB

## 2019-09-25 PROCEDURE — G0500 MOD SEDAT ENDO SERVICE >5YRS: HCPCS | Performed by: INTERNAL MEDICINE

## 2019-09-25 PROCEDURE — 85018 HEMOGLOBIN: CPT | Performed by: HOSPITALIST

## 2019-09-25 PROCEDURE — 83735 ASSAY OF MAGNESIUM: CPT | Performed by: HOSPITALIST

## 2019-09-25 PROCEDURE — 25000128 H RX IP 250 OP 636: Performed by: INTERNAL MEDICINE

## 2019-09-25 PROCEDURE — 36415 COLL VENOUS BLD VENIPUNCTURE: CPT | Performed by: HOSPITALIST

## 2019-09-25 PROCEDURE — C9113 INJ PANTOPRAZOLE SODIUM, VIA: HCPCS | Performed by: HOSPITALIST

## 2019-09-25 PROCEDURE — 83605 ASSAY OF LACTIC ACID: CPT | Performed by: HOSPITALIST

## 2019-09-25 PROCEDURE — 25000125 ZZHC RX 250: Performed by: HOSPITALIST

## 2019-09-25 PROCEDURE — 0DJ08ZZ INSPECTION OF UPPER INTESTINAL TRACT, VIA NATURAL OR ARTIFICIAL OPENING ENDOSCOPIC: ICD-10-PCS | Performed by: INTERNAL MEDICINE

## 2019-09-25 PROCEDURE — 96365 THER/PROPH/DIAG IV INF INIT: CPT | Mod: 59

## 2019-09-25 PROCEDURE — 80320 DRUG SCREEN QUANTALCOHOLS: CPT | Performed by: EMERGENCY MEDICINE

## 2019-09-25 PROCEDURE — 80053 COMPREHEN METABOLIC PANEL: CPT | Performed by: EMERGENCY MEDICINE

## 2019-09-25 PROCEDURE — 25800030 ZZH RX IP 258 OP 636: Performed by: EMERGENCY MEDICINE

## 2019-09-25 PROCEDURE — 96376 TX/PRO/DX INJ SAME DRUG ADON: CPT

## 2019-09-25 PROCEDURE — 43235 EGD DIAGNOSTIC BRUSH WASH: CPT | Performed by: INTERNAL MEDICINE

## 2019-09-25 PROCEDURE — 84132 ASSAY OF SERUM POTASSIUM: CPT | Performed by: INTERNAL MEDICINE

## 2019-09-25 PROCEDURE — 96375 TX/PRO/DX INJ NEW DRUG ADDON: CPT

## 2019-09-25 PROCEDURE — 85610 PROTHROMBIN TIME: CPT | Performed by: EMERGENCY MEDICINE

## 2019-09-25 PROCEDURE — 96368 THER/DIAG CONCURRENT INF: CPT

## 2019-09-25 PROCEDURE — 86900 BLOOD TYPING SEROLOGIC ABO: CPT | Performed by: EMERGENCY MEDICINE

## 2019-09-25 PROCEDURE — 25800030 ZZH RX IP 258 OP 636: Performed by: HOSPITALIST

## 2019-09-25 PROCEDURE — 25000132 ZZH RX MED GY IP 250 OP 250 PS 637: Mod: GY | Performed by: INTERNAL MEDICINE

## 2019-09-25 PROCEDURE — 99223 1ST HOSP IP/OBS HIGH 75: CPT | Mod: AI | Performed by: HOSPITALIST

## 2019-09-25 PROCEDURE — 25000125 ZZHC RX 250: Performed by: EMERGENCY MEDICINE

## 2019-09-25 PROCEDURE — 25000125 ZZHC RX 250: Performed by: INTERNAL MEDICINE

## 2019-09-25 PROCEDURE — HZ2ZZZZ DETOXIFICATION SERVICES FOR SUBSTANCE ABUSE TREATMENT: ICD-10-PCS | Performed by: HOSPITALIST

## 2019-09-25 PROCEDURE — C9113 INJ PANTOPRAZOLE SODIUM, VIA: HCPCS | Performed by: EMERGENCY MEDICINE

## 2019-09-25 RX ORDER — POTASSIUM CHLORIDE 7.45 MG/ML
10 INJECTION INTRAVENOUS
Status: DISCONTINUED | OUTPATIENT
Start: 2019-09-25 | End: 2019-10-01 | Stop reason: HOSPADM

## 2019-09-25 RX ORDER — TAMSULOSIN HYDROCHLORIDE 0.4 MG/1
0.4 CAPSULE ORAL DAILY
Status: DISCONTINUED | OUTPATIENT
Start: 2019-09-25 | End: 2019-10-01 | Stop reason: HOSPADM

## 2019-09-25 RX ORDER — SPIRONOLACTONE 25 MG/1
50 TABLET ORAL DAILY
Status: DISCONTINUED | OUTPATIENT
Start: 2019-09-25 | End: 2019-10-01 | Stop reason: HOSPADM

## 2019-09-25 RX ORDER — LORAZEPAM 2 MG/ML
1 INJECTION INTRAMUSCULAR ONCE
Status: COMPLETED | OUTPATIENT
Start: 2019-09-25 | End: 2019-09-25

## 2019-09-25 RX ORDER — DIAZEPAM 10 MG/2ML
2.5 INJECTION, SOLUTION INTRAMUSCULAR; INTRAVENOUS ONCE
Status: COMPLETED | OUTPATIENT
Start: 2019-09-25 | End: 2019-09-25

## 2019-09-25 RX ORDER — DULOXETIN HYDROCHLORIDE 60 MG/1
60 CAPSULE, DELAYED RELEASE ORAL DAILY
Status: DISCONTINUED | OUTPATIENT
Start: 2019-09-25 | End: 2019-10-01 | Stop reason: HOSPADM

## 2019-09-25 RX ORDER — ONDANSETRON 2 MG/ML
4 INJECTION INTRAMUSCULAR; INTRAVENOUS
Status: DISCONTINUED | OUTPATIENT
Start: 2019-09-25 | End: 2019-09-25 | Stop reason: HOSPADM

## 2019-09-25 RX ORDER — ONDANSETRON 2 MG/ML
4 INJECTION INTRAMUSCULAR; INTRAVENOUS EVERY 6 HOURS PRN
Status: DISCONTINUED | OUTPATIENT
Start: 2019-09-25 | End: 2019-09-25

## 2019-09-25 RX ORDER — MIRTAZAPINE 15 MG/1
15 TABLET, FILM COATED ORAL AT BEDTIME
Status: ON HOLD | COMMUNITY
End: 2019-10-30

## 2019-09-25 RX ORDER — POTASSIUM CL/LIDO/0.9 % NACL 10MEQ/0.1L
10 INTRAVENOUS SOLUTION, PIGGYBACK (ML) INTRAVENOUS
Status: DISCONTINUED | OUTPATIENT
Start: 2019-09-25 | End: 2019-10-01 | Stop reason: HOSPADM

## 2019-09-25 RX ORDER — POTASSIUM CHLORIDE 1500 MG/1
20-40 TABLET, EXTENDED RELEASE ORAL
Status: DISCONTINUED | OUTPATIENT
Start: 2019-09-25 | End: 2019-10-01 | Stop reason: HOSPADM

## 2019-09-25 RX ORDER — LIDOCAINE 40 MG/G
CREAM TOPICAL
Status: DISCONTINUED | OUTPATIENT
Start: 2019-09-25 | End: 2019-10-01 | Stop reason: HOSPADM

## 2019-09-25 RX ORDER — POTASSIUM CHLORIDE 29.8 MG/ML
20 INJECTION INTRAVENOUS
Status: DISCONTINUED | OUTPATIENT
Start: 2019-09-25 | End: 2019-10-01 | Stop reason: HOSPADM

## 2019-09-25 RX ORDER — QUETIAPINE FUMARATE 50 MG/1
50 TABLET, FILM COATED ORAL AT BEDTIME
Status: DISCONTINUED | OUTPATIENT
Start: 2019-09-25 | End: 2019-10-01 | Stop reason: HOSPADM

## 2019-09-25 RX ORDER — LIDOCAINE HYDROCHLORIDE 10 MG/ML
10 INJECTION, SOLUTION EPIDURAL; INFILTRATION; INTRACAUDAL; PERINEURAL ONCE
Status: COMPLETED | OUTPATIENT
Start: 2019-09-25 | End: 2019-09-25

## 2019-09-25 RX ORDER — FLUMAZENIL 0.1 MG/ML
0.2 INJECTION, SOLUTION INTRAVENOUS
Status: ACTIVE | OUTPATIENT
Start: 2019-09-25 | End: 2019-09-25

## 2019-09-25 RX ORDER — LANOLIN ALCOHOL/MO/W.PET/CERES
100 CREAM (GRAM) TOPICAL DAILY
Status: COMPLETED | OUTPATIENT
Start: 2019-09-26 | End: 2019-09-30

## 2019-09-25 RX ORDER — FOLIC ACID 1 MG/1
1 TABLET ORAL DAILY
Status: DISCONTINUED | OUTPATIENT
Start: 2019-09-26 | End: 2019-10-01 | Stop reason: HOSPADM

## 2019-09-25 RX ORDER — MIRTAZAPINE 15 MG/1
15 TABLET, FILM COATED ORAL AT BEDTIME
Status: DISCONTINUED | OUTPATIENT
Start: 2019-09-25 | End: 2019-10-01 | Stop reason: HOSPADM

## 2019-09-25 RX ORDER — NICOTINE 21 MG/24HR
1 PATCH, TRANSDERMAL 24 HOURS TRANSDERMAL DAILY
Status: DISCONTINUED | OUTPATIENT
Start: 2019-09-25 | End: 2019-10-01 | Stop reason: HOSPADM

## 2019-09-25 RX ORDER — FUROSEMIDE 40 MG
40 TABLET ORAL DAILY
Status: DISCONTINUED | OUTPATIENT
Start: 2019-09-25 | End: 2019-10-01 | Stop reason: HOSPADM

## 2019-09-25 RX ORDER — LORAZEPAM 1 MG/1
1-2 TABLET ORAL EVERY 30 MIN PRN
Status: DISCONTINUED | OUTPATIENT
Start: 2019-09-25 | End: 2019-09-27

## 2019-09-25 RX ORDER — POTASSIUM CL/LIDO/0.9 % NACL 10MEQ/0.1L
10 INTRAVENOUS SOLUTION, PIGGYBACK (ML) INTRAVENOUS ONCE
Status: COMPLETED | OUTPATIENT
Start: 2019-09-25 | End: 2019-09-25

## 2019-09-25 RX ORDER — SODIUM CHLORIDE 9 MG/ML
INJECTION, SOLUTION INTRAVENOUS CONTINUOUS
Status: DISCONTINUED | OUTPATIENT
Start: 2019-09-25 | End: 2019-09-25

## 2019-09-25 RX ORDER — NALOXONE HYDROCHLORIDE 0.4 MG/ML
.1-.4 INJECTION, SOLUTION INTRAMUSCULAR; INTRAVENOUS; SUBCUTANEOUS
Status: DISCONTINUED | OUTPATIENT
Start: 2019-09-25 | End: 2019-09-25

## 2019-09-25 RX ORDER — CEFTRIAXONE 1 G/1
1 INJECTION, POWDER, FOR SOLUTION INTRAMUSCULAR; INTRAVENOUS EVERY 24 HOURS
Status: DISCONTINUED | OUTPATIENT
Start: 2019-09-25 | End: 2019-09-25

## 2019-09-25 RX ORDER — ONDANSETRON 2 MG/ML
4 INJECTION INTRAMUSCULAR; INTRAVENOUS EVERY 6 HOURS PRN
Status: DISCONTINUED | OUTPATIENT
Start: 2019-09-25 | End: 2019-10-01 | Stop reason: HOSPADM

## 2019-09-25 RX ORDER — HYDROMORPHONE HYDROCHLORIDE 1 MG/ML
0.2 INJECTION, SOLUTION INTRAMUSCULAR; INTRAVENOUS; SUBCUTANEOUS
Status: DISCONTINUED | OUTPATIENT
Start: 2019-09-25 | End: 2019-09-27

## 2019-09-25 RX ORDER — MAGNESIUM SULFATE HEPTAHYDRATE 40 MG/ML
4 INJECTION, SOLUTION INTRAVENOUS EVERY 4 HOURS PRN
Status: DISCONTINUED | OUTPATIENT
Start: 2019-09-25 | End: 2019-10-01 | Stop reason: HOSPADM

## 2019-09-25 RX ORDER — FENTANYL CITRATE 50 UG/ML
INJECTION, SOLUTION INTRAMUSCULAR; INTRAVENOUS PRN
Status: DISCONTINUED | OUTPATIENT
Start: 2019-09-25 | End: 2019-09-25 | Stop reason: HOSPADM

## 2019-09-25 RX ORDER — POTASSIUM CHLORIDE 1.5 G/1.58G
20-40 POWDER, FOR SOLUTION ORAL
Status: DISCONTINUED | OUTPATIENT
Start: 2019-09-25 | End: 2019-10-01 | Stop reason: HOSPADM

## 2019-09-25 RX ORDER — LIDOCAINE 40 MG/G
CREAM TOPICAL
Status: DISCONTINUED | OUTPATIENT
Start: 2019-09-25 | End: 2019-09-25 | Stop reason: HOSPADM

## 2019-09-25 RX ORDER — LORAZEPAM 2 MG/ML
1-2 INJECTION INTRAMUSCULAR EVERY 30 MIN PRN
Status: DISCONTINUED | OUTPATIENT
Start: 2019-09-25 | End: 2019-09-27

## 2019-09-25 RX ORDER — ONDANSETRON 4 MG/1
4 TABLET, ORALLY DISINTEGRATING ORAL EVERY 6 HOURS PRN
Status: DISCONTINUED | OUTPATIENT
Start: 2019-09-25 | End: 2019-10-01 | Stop reason: HOSPADM

## 2019-09-25 RX ORDER — MULTIPLE VITAMINS W/ MINERALS TAB 9MG-400MCG
1 TAB ORAL DAILY
Status: DISCONTINUED | OUTPATIENT
Start: 2019-09-26 | End: 2019-10-01 | Stop reason: HOSPADM

## 2019-09-25 RX ORDER — OCTREOTIDE ACETATE 50 UG/ML
50 INJECTION, SOLUTION INTRAVENOUS; SUBCUTANEOUS ONCE
Status: COMPLETED | OUTPATIENT
Start: 2019-09-25 | End: 2019-09-25

## 2019-09-25 RX ORDER — FOLIC ACID 1 MG/1
1 TABLET ORAL DAILY
Status: DISCONTINUED | OUTPATIENT
Start: 2019-09-25 | End: 2019-09-25

## 2019-09-25 RX ORDER — ALBUTEROL SULFATE 90 UG/1
2 AEROSOL, METERED RESPIRATORY (INHALATION) EVERY 6 HOURS PRN
Status: DISCONTINUED | OUTPATIENT
Start: 2019-09-25 | End: 2019-10-01 | Stop reason: HOSPADM

## 2019-09-25 RX ORDER — ONDANSETRON 4 MG/1
4 TABLET, ORALLY DISINTEGRATING ORAL EVERY 6 HOURS PRN
Status: DISCONTINUED | OUTPATIENT
Start: 2019-09-25 | End: 2019-09-25

## 2019-09-25 RX ORDER — NALOXONE HYDROCHLORIDE 0.4 MG/ML
.1-.4 INJECTION, SOLUTION INTRAMUSCULAR; INTRAVENOUS; SUBCUTANEOUS
Status: DISCONTINUED | OUTPATIENT
Start: 2019-09-25 | End: 2019-10-01 | Stop reason: HOSPADM

## 2019-09-25 RX ADMIN — Medication 10 MEQ: at 01:13

## 2019-09-25 RX ADMIN — SPIRONOLACTONE 50 MG: 25 TABLET ORAL at 14:48

## 2019-09-25 RX ADMIN — OCTREOTIDE ACETATE 50 MCG/HR: 200 INJECTION, SOLUTION INTRAVENOUS; SUBCUTANEOUS at 02:28

## 2019-09-25 RX ADMIN — CEFTRIAXONE SODIUM 1 G: 1 INJECTION, POWDER, FOR SOLUTION INTRAMUSCULAR; INTRAVENOUS at 03:49

## 2019-09-25 RX ADMIN — LORAZEPAM 1 MG: 1 TABLET ORAL at 21:06

## 2019-09-25 RX ADMIN — FLUTICASONE FUROATE AND VILANTEROL TRIFENATATE 1 PUFF: 200; 25 POWDER RESPIRATORY (INHALATION) at 14:51

## 2019-09-25 RX ADMIN — MAGNESIUM SULFATE HEPTAHYDRATE 4 G: 40 INJECTION, SOLUTION INTRAVENOUS at 18:32

## 2019-09-25 RX ADMIN — FUROSEMIDE 40 MG: 40 TABLET ORAL at 14:48

## 2019-09-25 RX ADMIN — DULOXETINE HYDROCHLORIDE 60 MG: 60 CAPSULE, DELAYED RELEASE ORAL at 14:48

## 2019-09-25 RX ADMIN — PANTOPRAZOLE SODIUM 40 MG: 40 INJECTION, POWDER, LYOPHILIZED, FOR SOLUTION INTRAVENOUS at 21:08

## 2019-09-25 RX ADMIN — POTASSIUM CHLORIDE 40 MEQ: 1500 TABLET, EXTENDED RELEASE ORAL at 21:07

## 2019-09-25 RX ADMIN — PANTOPRAZOLE SODIUM 40 MG: 40 INJECTION, POWDER, LYOPHILIZED, FOR SOLUTION INTRAVENOUS at 09:58

## 2019-09-25 RX ADMIN — LORAZEPAM 1 MG: 2 INJECTION INTRAMUSCULAR; INTRAVENOUS at 00:31

## 2019-09-25 RX ADMIN — PANTOPRAZOLE SODIUM 80 MG: 40 INJECTION, POWDER, FOR SOLUTION INTRAVENOUS at 00:56

## 2019-09-25 RX ADMIN — OCTREOTIDE ACETATE 50 MCG: 50 INJECTION, SOLUTION INTRAVENOUS; SUBCUTANEOUS at 02:27

## 2019-09-25 RX ADMIN — THIAMINE HYDROCHLORIDE: 100 INJECTION, SOLUTION INTRAMUSCULAR; INTRAVENOUS at 00:56

## 2019-09-25 RX ADMIN — DIAZEPAM 2.5 MG: 5 INJECTION, SOLUTION INTRAMUSCULAR; INTRAVENOUS at 01:43

## 2019-09-25 RX ADMIN — POTASSIUM CHLORIDE 20 MEQ: 1500 TABLET, EXTENDED RELEASE ORAL at 23:07

## 2019-09-25 RX ADMIN — LIDOCAINE HYDROCHLORIDE 10 ML: 10 INJECTION, SOLUTION EPIDURAL; INFILTRATION; INTRACAUDAL; PERINEURAL at 13:15

## 2019-09-25 RX ADMIN — SODIUM CHLORIDE: 9 INJECTION, SOLUTION INTRAVENOUS at 06:29

## 2019-09-25 RX ADMIN — NICOTINE 1 PATCH: 21 PATCH, EXTENDED RELEASE TRANSDERMAL at 09:58

## 2019-09-25 RX ADMIN — QUETIAPINE 50 MG: 25 TABLET ORAL at 21:51

## 2019-09-25 RX ADMIN — MIRTAZAPINE 15 MG: 15 TABLET, FILM COATED ORAL at 21:51

## 2019-09-25 RX ADMIN — TAMSULOSIN HYDROCHLORIDE 0.4 MG: 0.4 CAPSULE ORAL at 14:48

## 2019-09-25 RX ADMIN — LORAZEPAM 1 MG: 2 INJECTION INTRAMUSCULAR; INTRAVENOUS at 01:12

## 2019-09-25 ASSESSMENT — ENCOUNTER SYMPTOMS
ABDOMINAL PAIN: 0
NAUSEA: 0
VOMITING: 0
TREMORS: 1
ROS GI COMMENTS: NO HEMATEMESIS
BLOOD IN STOOL: 1
WEAKNESS: 1

## 2019-09-25 ASSESSMENT — ACTIVITIES OF DAILY LIVING (ADL)
ADLS_ACUITY_SCORE: 20
ADLS_ACUITY_SCORE: 21
ADLS_ACUITY_SCORE: 20

## 2019-09-25 NOTE — PROGRESS NOTES
[facility]    Medicine Progress Note - Hospitalist Service        Date of Admission:  9/25/2019 12:13 AM    Assessment & Plan:   Jim Richard is a pleasant 64 year old male with history of alcoholic liver disease, ulcers, esophageal varices, CAD, hypertension, hyperlipidemia, peripheral vascular disease, tobacco use, subdural hematoma, and chronic low back pain who presented to the ED with generalized weakness. He also reports melena x 2 days.     Melena  Suspected Upper GI bleed  Anemia, acute on chronic  History of duodenal ulcer and esophageal varices  -Hgb 8.9 on admission, slightly lower than recent baseline of around 9.5-10  -No ongoing evidence of bleeding, patient reports brown stool this morning  -Continue IV Protonix and octreotide  -Evaluated by GI this morning, tentatively plan for upper GI endoscopy today  -NPO  -Serial hemoglobin, transfuse as needed     Acute Alcohol withdrawal  Alcoholic liver disease  Ascites - history of paracentesis  -last drink 6 days ago, tremors noted, no history of seizure  -CIWA protocol, telemetry, thiamine folate MV ordered, judicious use of benzodiazepine given his history of hepatic encephalopathy  -ceftriaxone iv ordered for SBP prophylaxis  -?  Paracentesis     Hypokalemia and hypomagnesemia  -replete and monitor per protocol      Tobacco abuse  -pack a day smoker  -21 mg patch ordered per request  -encouraged cessation     Depression and Anxiety  -Continue PTA regimen once able to take orally    Pancytopenia  -likely secondary to chronic alcoholism (bone marrow suppression) and gi bleed       Diet: NPO for Medical/Clinical Reasons Except for: Meds, Ice Chips     DVT Prophylaxis: Pneumatic Compression Devices   Leger Catheter: not present  Code Status: DNR/DNI     Disposition Plan    Expected discharge: 2 - 3 days, recommended to TBD.   Entered: Luis Bateman MD 09/25/2019, 8:21 AM        The patient's care was discussed with the Bedside Nurse  "and Patient.    Luis Bateman MD  Hospitalist Service  [facility]    ______________________________________________________________________    Interval History       Data reviewed today: I reviewed all medications, new labs and imaging results over the last 24 hours. I personally reviewed no images or EKG's today.    Physical Exam   Vital signs:  Temp: 98.7  F (37.1  C) Temp src: Oral BP: 116/66 Pulse: 97 Heart Rate: 81 Resp: 16 SpO2: 94 % O2 Device: None (Room air)     Weight: 86.2 kg (190 lb)  Estimated body mass index is 29.76 kg/m  as calculated from the following:    Height as of 7/10/19: 1.702 m (5' 7\").    Weight as of this encounter: 86.2 kg (190 lb).      Wt Readings from Last 2 Encounters:   09/25/19 86.2 kg (190 lb)   08/13/19 84 kg (185 lb 3 oz)       Gen: AAOX3, NAD  HEENT: Supple neck, moist oral mucosa, no pallor  Resp: CTA B/L, normal WOB, no crackles, no wheezes  CVS: RRR, no murmur  Abd/GI: Soft, distended, ascites +  skin: Warm, dry no rashes  MSK: No joint deformities, trace pedal edema  Neuro- CN- intact. No focal deficits.        Data   Recent Labs   Lab 09/25/19  0320 09/25/19  0025   WBC 3.1* 3.0*   HGB 8.7* 8.9*   MCV 96 97   PLT 59* 67*   INR  --  1.35*   NA  --  136   POTASSIUM  --  3.0*   CHLORIDE  --  101   CO2  --  28   BUN  --  17   CR  --  0.89   ANIONGAP  --  7   KEV  --  7.5*   GLC  --  92   ALBUMIN  --  2.5*   PROTTOTAL  --  6.1*   BILITOTAL  --  2.4*   ALKPHOS  --  268*   ALT  --  34   AST  --  147*       No results found for this or any previous visit (from the past 24 hour(s)).  Medications     octreotide (sandoSTATIN) infusion ADULT Stopped (09/25/19 0231)     sodium chloride 75 mL/hr at 09/25/19 0629       cefTRIAXone  1 g Intravenous Q24H     [START ON 9/26/2019] folic acid  1 mg Oral Daily     [START ON 9/26/2019] multivitamin w/minerals  1 tablet Oral Daily     nicotine  1 patch Transdermal Daily     nicotine   Transdermal Q8H     [START ON 9/26/2019] " nicotine   Transdermal Daily     pantoprazole (PROTONIX) IV  40 mg Intravenous BID     sodium chloride (PF)  3 mL Intracatheter Q8H     [START ON 9/26/2019] vitamin B1  100 mg Oral Daily

## 2019-09-25 NOTE — PROGRESS NOTES
Care Suites Procedure Nursing Note    Procedure: paracentesis  Procedure started time: 1305  Procedure completed time: 1332  Concerns/abnormal assessment:platelet count 59, SHADY Angeles notified and wants to proceed  Plan:3500 ml of clear dark yellow fluid removed . Puncture site lower left abdomen.VSS, pt tolerated well, medical glue applied to site with bandage.pt transported back to room via cart by radiology NA

## 2019-09-25 NOTE — ED NOTES
"[facility]  ED Nurse Handoff Report    ED Chief complaint: Generalized Weakness and Alcohol Problem      ED Diagnosis:   Final diagnoses:   None       Code Status: hospitalist to address     Allergies:   Allergies   Allergen Reactions     Amlodipine Swelling     Lisinopril      Other reaction(s): Angioedema  Mouth and tongue swelling   Mouth and tongue swelling          Activity level - Baseline/Home:  Stand with Assist  Activity Level - Current:   Stand with Assist of 2    Patient's Preferred language: english   Needed?: No    Isolation: No  Infection: Not Applicable  Bariatric?: No    Vital Signs:   Vitals:    09/25/19 0020 09/25/19 0025 09/25/19 0100 09/25/19 0140   BP: 132/78  (!) 123/94 121/87   Pulse: 78  74 73   Resp: 22      Temp: 98.4  F (36.9  C)      TempSrc: Oral      SpO2: 99%  96% 96%   Weight:  86.2 kg (190 lb)         Cardiac Rhythm: ,        Pain level:      Is this patient confused?: No   Does this patient have a guardian?  No         If yes, is there guardianship documents in the Epic \"Code/ACP\" activity?  N/A         Guardian Notified?  N/A  [place] - Suicide Severity Rating Scale Completed?  Yes  If yes, what color did the patient score?  White    Patient Report: Initial Complaint: weakness, tremors  Focused Assessment: pt weak- typically uses walker to get around, last night was unable to walk d/t weakness. Pt tremulous, sober from etoh X 3 weeks, relapsed Thursday- last drink Thursday. Alcohol level zero tonight. Pt has ascites  Tests Performed:   Results for orders placed or performed during the hospital encounter of 09/25/19   CBC with platelets + differential   Result Value Ref Range    WBC 3.0 (L) 4.0 - 11.0 10e9/L    RBC Count 2.79 (L) 4.4 - 5.9 10e12/L    Hemoglobin 8.9 (L) 13.3 - 17.7 g/dL    Hematocrit 27.1 (L) 40.0 - 53.0 %    MCV 97 78 - 100 fl    MCH 31.9 26.5 - 33.0 pg    MCHC 32.8 31.5 - 36.5 g/dL    RDW 15.5 (H) 10.0 - 15.0 %    Platelet Count 67 (L) " 150 - 450 10e9/L    Diff Method Automated Method     % Neutrophils 68.9 %    % Lymphocytes 14.0 %    % Monocytes 15.7 %    % Eosinophils 0.7 %    % Basophils 0.7 %    % Immature Granulocytes 0.0 %    Absolute Neutrophil 2.1 1.6 - 8.3 10e9/L    Absolute Lymphocytes 0.4 (L) 0.8 - 5.3 10e9/L    Absolute Monocytes 0.5 0.0 - 1.3 10e9/L    Absolute Eosinophils 0.0 0.0 - 0.7 10e9/L    Absolute Basophils 0.0 0.0 - 0.2 10e9/L    Abs Immature Granulocytes 0.0 0 - 0.4 10e9/L   Comprehensive metabolic panel   Result Value Ref Range    Sodium 136 133 - 144 mmol/L    Potassium 3.0 (L) 3.4 - 5.3 mmol/L    Chloride 101 94 - 109 mmol/L    Carbon Dioxide 28 20 - 32 mmol/L    Anion Gap 7 3 - 14 mmol/L    Glucose 92 70 - 99 mg/dL    Urea Nitrogen 17 7 - 30 mg/dL    Creatinine 0.89 0.66 - 1.25 mg/dL    GFR Estimate 90 >60 mL/min/[1.73_m2]    GFR Estimate If Black >90 >60 mL/min/[1.73_m2]    Calcium 7.5 (L) 8.5 - 10.1 mg/dL    Bilirubin Total 2.4 (H) 0.2 - 1.3 mg/dL    Albumin 2.5 (L) 3.4 - 5.0 g/dL    Protein Total 6.1 (L) 6.8 - 8.8 g/dL    Alkaline Phosphatase 268 (H) 40 - 150 U/L    ALT 34 0 - 70 U/L     (H) 0 - 45 U/L   INR   Result Value Ref Range    INR 1.35 (H) 0.86 - 1.14   Magnesium   Result Value Ref Range    Magnesium 0.9 (LL) 1.6 - 2.3 mg/dL   Alcohol ethyl   Result Value Ref Range    Ethanol g/dL <0.01 <0.01 g/dL   EKG 12 lead   Result Value Ref Range    Interpretation ECG Click View Image link to view waveform and result    ABO/Rh type and screen   Result Value Ref Range    ABO A     RH(D) Neg     Antibody Screen Neg     Test Valid Only At [facility]        Specimen Expires 09/28/2019      Abnormal Results: see above, electrolyte imbalances, weakness   Treatments provided: see MAR    Family Comments:     OBS brochure/video discussed/provided to patient/family: N/A              Name of person given brochure if not patient:               Relationship to patient:     ED Medications:   Medications    potassium chloride 10 mEq in 100 mL intermittent infusion with 10 mg lidocaine (10 mEq Intravenous New Bag 9/25/19 0113)   octreotide (sandoSTATIN) 1,250 mcg in sodium chloride 0.9 % 250 mL (has no administration in time range)   octreotide (sandoSTATIN) injection 50 mcg (has no administration in time range)   sodium chloride 0.9 % 1,000 mL with INFUVITE ADULT 10 mL, thiamine 100 mg, folic acid 1 mg, magnesium sulfate 2 g infusion ( Intravenous Stopped 9/25/19 0153)   pantoprazole (PROTONIX) 80 mg in sodium chloride 0.9 % 100 mL intermittent infusion (0 mg Intravenous Stopped 9/25/19 0153)   LORazepam (ATIVAN) injection 1 mg (1 mg Intravenous Given 9/25/19 0031)   LORazepam (ATIVAN) injection 1 mg (1 mg Intravenous Given 9/25/19 0112)   diazepam (VALIUM) injection 2.5 mg (2.5 mg Intravenous Given 9/25/19 0143)       Drips infusing?:  Yes    For the majority of the shift this patient was Green.   Interventions performed were .    Severe Sepsis OR Septic Shock Diagnosis Present: No    To be done/followed up on inpatient unit:  none at this time     ED NURSE PHONE NUMBER: [phone]

## 2019-09-25 NOTE — PLAN OF CARE
A&Ox4, occasionally forgetful.  VSS, RA.  Patient arrived from ED and was not able to stand to transfer to the bed from the stretcher, transferred via roller board.  Patient is using the bedpan/urinal in bed at this time due to weakness.  Patient has visible tremors; CIWA - 6.  2 PIVs; octreotide drip 10 mL/hr, NS @ 75 mL/hr.  Patient complains of slight R-sided abdominal discomfort, no pain meds ordered, patient declines other non-pharmalogical interventions such as heat packs at this time.  NPO status at this time awaiting GI consult.  Discharge pending.      ADDENDUM: 0439: paged Dr. Davidson for patient's request of pain medications, will order.

## 2019-09-25 NOTE — PROCEDURES
[facility]    Procedure: Paracentesis  Date/Time: 9/25/2019 1:28 PM  Performed by: Gloria Singletary PA-C  Authorized by: Gloria Singletary PA-C     UNIVERSAL PROTOCOL   Site Marked: Yes  Prior Images Obtained and Reviewed:  Yes  Required items: Required blood products, implants, devices and special equipment available    Patient identity confirmed:  Verbally with patient  NA - No sedation, light sedation, or local anesthesia  Confirmation Checklist:  Patient's identity using two indicators, relevant allergies, procedure was appropriate and matched the consent or emergent situation and correct equipment/implants were available  Time out: Immediately prior to the procedure a time out was called    Preparation: Patient was prepped and draped in usual sterile fashion    ESBL (mL):  1     ANESTHESIA    Anesthesia: Local infiltration  Local Anesthetic:  Lidocaine 1% without epinephrine  Anesthetic Total (mL):  10      SEDATION    Patient Sedated: No    See dictated procedure note for full details.  PROCEDURE   Patient Tolerance:  Patient tolerated the procedure well with no immediate complications  Describe Procedure: Paracentesis performed  Straw colored fluid removed  Time of Sedation in Minutes by Physician:  0

## 2019-09-25 NOTE — ED PROVIDER NOTES
History     Chief Complaint:  Generalized Weakness    The history is provided by the patient and the EMS personnel.      Jim Richard is a 64 year old male who is six days sober and has a history of GI ulcers,and  esophageal varices who presents to the emergency department today via EMS for evaluation of generalized weakness. The patient reports he had abstained from alcohol use for three week prior to drinking for one day on 09/19/2019. Following the drinking he had some black and tarry stools which resolved two days ago. Today he endorses an onset of weakness and shakiness. The patient denies nausea, vomiting, abdominal pain, recent falls, chest pain, hematemesis, or regular NSAID use. He was given fluids en route with EMS as his heart rate was tachy in the 90's while sitting, and his blood pressure was 132/70.    Allergies:  Lisinopril  Amlodipine    Medications:    Albuterol inhaler  Cymbalta  Folvite  Lasix  Lactulose  Mirtazapine  Protonix  Seroquel  Inderal  Flomax    Past Medical History:    Anxiety  CAD  Depression  Esophageal varices  Heart attack  Hepatomegaly  Hyperlipidemia  Hypertension  JANIS  PVD  Subdural hematoma  Spinal stenosis  GI Bleed  Alcohol abuse, history of  Chronic low back pain     Past Surgical History:    Appendectomy  Bypass graft femoropoliteal  Endarterectomy, femoral  EGD, x 3  T & A  PVD Surgery     Family History:    Mental illness  CAD- Mother  Substance abuse  Cancer- lung     Social History:  The patient was unaccompanied to the ED.  Smoking Status: Positive   Smokeless Tobacco: Never Used  Alcohol Use: Sober, six days  Drug Use: Negative    Marital Status:      Review of Systems   Cardiovascular: Negative for chest pain.   Gastrointestinal: Positive for blood in stool (since resolved). Negative for abdominal pain, nausea and vomiting.        No hematemesis    Neurological: Positive for tremors and weakness.   All other systems reviewed and are  negative.    Physical Exam     Patient Vitals for the past 24 hrs:   BP Temp Temp src Pulse Heart Rate Resp SpO2 Weight   09/25/19 0309 115/62 97.6  F (36.4  C) Oral -- 76 20 95 % --   09/25/19 0229 -- -- -- -- -- -- 98 % --   09/25/19 0220 111/67 -- -- 97 -- -- 96 % --   09/25/19 0140 121/87 -- -- 73 75 -- 96 % --   09/25/19 0100 (!) 123/94 -- -- 74 -- -- 96 % --   09/25/19 0025 -- -- -- -- -- -- -- 86.2 kg (190 lb)   09/25/19 0020 132/78 98.4  F (36.9  C) Oral 78 78 22 99 % --      Physical Exam  Nursing note and vitals reviewed.  Constitutional:  Appears well-developed and well-nourished. Tremulous.  HENT:   Head:    Atraumatic.   Mouth/Throat:   Oropharynx is clear and moist. No oropharyngeal exudate.   Eyes:    Pupils are equal, round, and reactive to light. Conjunctiva is pale and slightly icteric.   Neck:    Normal range of motion. Neck supple.      No tracheal deviation present. No thyromegaly present.   Cardiovascular:  Normal rate, regular rhythm, no murmur   Pulmonary/Chest: Breath sounds are clear and equal without wheezes or crackles.  Abdominal:   Soft. Bowel sounds are normal. Exhibits no distension and      no mass. There is no tenderness. Distended and not tense.     There is no rebound and no guarding.   Musculoskeletal:  Exhibits no edema. Trace pre-tibular edema bilaterally.  Lymphadenopathy:  No cervical adenopathy.   Neurological:   Alert and oriented to person, place, and time. GCS 15.  CN 2-12 intact.  and proximal upper extremity strength strong and equal.  Bilateral lower extremity strength strong and equal, including strong dorsiflexion and plantarflexion strength.  Sensation intact and equal to the face, arms and legs.  No facial droop or weakness. Normal speech.  Follows commands and answers questions normally. Tremulous.  Skin:    Skin is warm and dry. No rash noted. No pallor.      Emergency Department Course     ECG:  ECG taken at 0029, ECG read at 0039  Normal sinus  rhythm  Right bundle branch block  Abnormal ECG   Rate 72 bpm. IA interval 144. QRS duration 134. QT/QTc 490/536. P-R-T axes 61 -25 -5.  No significant change compared to 08/02/2019     Laboratory:  Laboratory findings were communicated with the patient who voiced understanding of the findings.  CBC: WNL (WBC 3.0, HGB 8.9, PLT 67)  CMP: Potassium 3.0 (L), Calcium 7.5 (L), Bilirubin total 2.4 (H), Albumin 2.5 (L), Protein total 6.1 (L), Alkaline phosphatase 268 (H),  (H). O/w WNL (Creatinine 0.89)  Alcohol ethyl: <0.01  Magnesium: 0.9 (L)  INR: 1.35 (H)   ABO/Rh Type & Screen: A Negative, Antibody screen negative.     Interventions:  0031 Ativan 1 mg IV  0056 Protonix 80 mg IV  0056 0.9% NaCl 1 l with infuvite Adult 10 mL, thiamine 100 mg, folic acid 1 mg, magnesium sulfate 2 g IV  0112 Ativan 1 mg IV  0113 Potassium Chloride 10 mEq IV  0143 Valium 2.5 mg IV  0227 Octerotide 50 mcg IV  0228 Octerotide 50 mcg/hr IV    Emergency Department Course:  0015 Nursing notes and vitals reviewed.  0019 I performed an exam of the patient as documented above.   0026 IV was inserted and blood was drawn for laboratory testing, results above.   0029 An ECG was performed, results above.  0130 I spoke with the Hospitalist, Dr. Ledesma from [place] regarding patient's presentation, findings, and plan of care.   0155 Patient was rechecked and updated.    Findings and plan explained to the Patient who consents to admission. Discussed the patient with Dr. Ledesma, who will admit the patient to a medical bed for further monitoring, evaluation, and treatment.     I personally reviewed the laboratory results with the Patient and answered all related questions prior to admission.    Impression & Plan      Medical Decision Making:  I found this patient to have acute alcohol withdrawal syndrome and likely recent GI bleed since he does state he has a history of esophageal varices. He was given Infuvite and Protonix IV and hemoglobin was  8.9 which will need to be monitored. His melena has resolved so there's not likely any active GI bleeding. He is admitted to the medical floor for further evaluation and treatment with GI consultation. There is no sign of infection or bacterial peritonitis at this time although he does have a history of ascites.       Diagnosis:    ICD-10-CM    1. Alcohol withdrawal, uncomplicated (H) F10.230    2. Anemia, unspecified type D64.9    3. Melena K92.1    4. Alcoholic cirrhosis of liver with ascites (H) K70.31        Disposition:  The patient is admitted into the care of Dr. Ledesma.     Scribe Disclosure:  Elaine VALENTE, am serving as a scribe at 12:18 AM on 9/25/2019 to document services personally performed by Danae Obregon MD based on my observations and the provider's statements to me.    9/25/2019    EMERGENCY DEPARTMENT       Danae Obregon MD  09/25/19 0555

## 2019-09-25 NOTE — PROGRESS NOTES
RECEIVING UNIT ED HANDOFF REVIEW    ED Nurse Handoff Report was reviewed by: She De Los Santos RN on September 25, 2019 at 2:16 AM

## 2019-09-25 NOTE — CONSULTS
[facility]    Gastroenterology Consultation    Date of Admission:  9/25/2019    History of Present Illness   Jim Richard is a 64 year old male who presents with melena. Pt is well know to our service.  PMH includes alcohol dependence, alcoholic liver disease complicated by decompensated liver cirrhosis (ascites, hepatic encephalopathy, currently MELD-Na 14 which improved from prior), depression and anxiety admitted to the hospital due to generalized weakness, melena and feeling of Etoh withdrawal.    He was last seen by author in early 8/2019 due to melena and ascites. S/p EGD w/ no esophageal varices but bleeding from esophageal ulcer. S/p paracentesis and was put on diuretics. He also developed hepatic encephalopathy last visit with iatrogenic encephalopathy with CIWA protocol.     Since discharge, he has tried to cold turkey to stop drinking and was taking the medication provided religiously. However he broke the habit and went back to drink about 70 oz of beer 6-7 days ago and subsequently developed weakness and melena. He took meloxicam every now and then but not on carranza basis. Denies any fever or chill.    Assessment & Plan   Jim Richard is a 64 year old male who was admitted on 9/25/2019. I was asked to see the patient for melena.    Melena:  -pt s/p repletion of Mg and K per nursing staff  -will tentatively plan for EGD this am  -c/w IV ceftriaxone, octreotide  -c/w IV PPI    Ascites:  -consider therapeutic paracentesis when bleeding/melena hx is clear  -then resume po diuretics     Hepatic encephalopathy:  -recommend lactulose and titrate to 2-3 BM per day  -please note pt was really sensitive to benzo last visit and may get iatrogenic encephalopathy from CIWA protocol, will recommend to be cautious about the dosing    Active Problems:    Kellie Arguello MD  ([facility]  Office: [phone]  Cell: [phone], please feel free  to call the cell    Code Status    DNR/DNI      Primary Care Physician   FERNANDA Arguello MD  ([facility]– [place][note 2[place]  [facility]  Office: [phone]  Cell: [phone], please feel free to call the cell      Past Medical History   I have reviewed this patient's medical history and updated it with pertinent information if needed.   Past Medical History:   Diagnosis Date     Alcoholism (H) 1/14/2013    had treatment at [facility]     Anxiety      Coronary artery disease 09/09/2014    Nuclear stress test with slight fixed defect inferiorly, no ischemia     Depression     w/anxiety     Esophageal varices with bleeding 04/28/2018    6 varices banded on EGD     Heart attack (H)      Hepatomegaly     Fatty liver, chronic alcoholic     Hyperlipemia      Hypertension      JANIS on CPAP      Peripheral vascular disease (H)      PVD (peripheral vascular disease) (H)      SDH (subdural hematoma) (H) 04/26/2018    Right side, resolved spontaneously     Spinal stenosis        Past Surgical History   I have reviewed this patient's surgical history and updated it with pertinent information if needed.  Past Surgical History:   Procedure Laterality Date     APPENDECTOMY       BYPASS GRAFT FEMOROPOPLITEAL  6/12/2012    Procedure: BYPASS GRAFT FEMOROPOPLITEAL;  LEFT FEMORAL TO ABOVE KNEE POPLITEAL BYPASS, ENDARTERECTOMY OF SFA AND PF ARTERIES, LEFT EXTERNAL ILIAC TO COMMON FEMORAL INTERPOSITION GRAFT;  Surgeon: Damir Roberts MD;  Location:  OR     COLONOSCOPY       COLONOSCOPY N/A 8/8/2019    Procedure: COLONOSCOPY;  Surgeon: Pavan Arguello MD;  Location:  GI     ENDARTERECTOMY FEMORAL  6/12/2012    Procedure: ENDARTERECTOMY FEMORAL;;  Surgeon: Damir Roberts MD;  Location:  OR     ESOPHAGOSCOPY, GASTROSCOPY, DUODENOSCOPY (EGD), COMBINED N/A 3/22/2018    Procedure: COMBINED ESOPHAGOSCOPY, GASTROSCOPY, DUODENOSCOPY (EGD);  ESOPHAGOSCOPY, GASTROSCOPY, DUODENOSOCPY.;  Surgeon:  Valeri Pruitt MD;  Location:  OR     ESOPHAGOSCOPY, GASTROSCOPY, DUODENOSCOPY (EGD), COMBINED N/A 9/29/2018    Procedure: COMBINED ESOPHAGOSCOPY, GASTROSCOPY, DUODENOSCOPY (EGD);;  Surgeon: Rosendo Shaw MD;  Location:  GI     ESOPHAGOSCOPY, GASTROSCOPY, DUODENOSCOPY (EGD), COMBINED N/A 8/2/2019    Procedure: ESOPHAGOGASTRODUODENOSCOPY (EGD);  Surgeon: Pavan Arguello MD;  Location:  GI     HERNIA REPAIR  2017    Abdominal     LAMINECTOMY, FUSION LUMBAR THREE+ LEVEL, COMBINED N/A 9/22/2014    Procedure: COMBINED LAMINECTOMY, FUSION LUMBAR THREE+ LEVEL;  Surgeon: Sebastien Pruitt MD;  Location:  OR     TONSILLECTOMY & ADENOIDECTOMY         Prior to Admission Medications   Prior to Admission Medications   Prescriptions Last Dose Informant Patient Reported? Taking?   DULoxetine (CYMBALTA) 60 MG EC capsule  Self No No   Sig: Take 1 capsule (60 mg) by mouth daily   QUEtiapine (SEROQUEL) 50 MG tablet   Yes No   Sig: Take 50 mg by mouth 3 times daily as needed (anxiety/sleep)   albuterol (PROAIR HFA/PROVENTIL HFA/VENTOLIN HFA) 108 (90 BASE) MCG/ACT Inhaler  Self Yes No   Sig: Inhale 2 puffs into the lungs every 6 hours as needed    fluticasone-vilanterol (BREO ELLIPTA) 200-25 MCG/INH oral inhaler  Self Yes No   Sig: Inhale 1 puff into the lungs daily    folic acid (FOLVITE) 1 MG tablet   No No   Sig: Take 1 tablet (1 mg) by mouth daily   furosemide (LASIX) 40 MG tablet   No No   Sig: Take 1 tablet (40 mg) by mouth daily   lactulose (CEPHULAC) 20 GM packet   No No   Sig: Take 1 packet (20 g) by mouth daily   mirtazapine (REMERON) 30 MG tablet   Yes No   Sig: Take 30 mg by mouth At Bedtime   multivitamin, therapeutic with minerals (THERA-VIT-M) TABS tablet  Self No No   Sig: Take 1 tablet by mouth daily   nicotine (NICODERM CQ) 21 MG/24HR 24 hr patch   No No   Sig: Place 1 patch onto the skin daily   order for DME   No No   Sig: Equipment being ordered: Fonemeshs () and Walker  ()  Treatment Diagnosis: difficulty walking   pantoprazole (PROTONIX) 40 MG EC tablet   No No   Sig: Take 1 tablet (40 mg) by mouth every morning (before breakfast)   propranolol (INDERAL) 10 MG tablet   No No   Sig: Take 1 tablet (10 mg) by mouth 2 times daily   spironolactone (ALDACTONE) 25 MG tablet   No No   Sig: Take 2 tablets (50 mg) by mouth daily   tamsulosin (FLOMAX) 0.4 MG capsule   Yes No   Sig: Take 0.4 mg by mouth daily   vitamin B1 (THIAMINE) 100 MG tablet   No No   Sig: Take 1 tablet (100 mg) by mouth daily      Facility-Administered Medications: None     Allergies   Allergies   Allergen Reactions     Amlodipine Swelling     Lisinopril      Other reaction(s): Angioedema  Mouth and tongue swelling   Mouth and tongue swelling          Social History   I have reviewed this patient's social history and updated it with pertinent information if needed. Jim CALDERON Jose Manuel  reports that he has been smoking cigarettes. He has been smoking about 1.00 pack per day. He has never used smokeless tobacco. He reports current alcohol use. He reports that he does not use drugs.    Family History   I have reviewed this patient's family history and updated it with pertinent information if needed.   Family History   Problem Relation Age of Onset     Mental Illness Son      Coronary Artery Disease Early Onset Mother      Substance Abuse Father      Lung Cancer Father      Substance Abuse Brother      Unknown/Adopted No family hx of      Depression No family hx of      Anxiety Disorder No family hx of      Schizophrenia No family hx of      Bipolar Disorder No family hx of      Suicide No family hx of      Dementia No family hx of      [place] Disease No family hx of      Parkinsonism No family hx of      Autism Spectrum Disorder No family hx of      Intellectual Disability (Mental Retardation) No family hx of        Review of Systems   The 10 point Review of Systems is negative other than noted in the HPI or here.      Physical Exam   Temp: 98.7  F (37.1  C) Temp src: Oral BP: 116/66 Pulse: 97 Heart Rate: 81 Resp: 16 SpO2: 94 % O2 Device: None (Room air)    Vital Signs with Ranges  Temp:  [97.6  F (36.4  C)-98.7  F (37.1  C)] 98.7  F (37.1  C)  Pulse:  [73-97] 97  Heart Rate:  [75-81] 81  Resp:  [16-22] 16  BP: (111-132)/(62-94) 116/66  SpO2:  [94 %-99 %] 94 %  190 lbs 0 oz    Exam:  Constitutional: healthy, alert and no distress  Head: Normocephalic. No masses, lesions, tenderness or abnormalities  Neck: Neck supple. No adenopathy. Thyroid symmetric, normal size,, Carotids without bruits.  ENT: ENT exam normal, no neck nodes or sinus tenderness  Cardiovascular: negative, PMI normal. No lifts, heaves, or thrills. RRR. No murmurs, clicks gallops or rub  Respiratory: negative, Percussion normal. Good diaphragmatic excursion. Lungs clear  Gastrointestinal: Abdomen soft but distended with large fluid wave, palpable liver edge under the rib cage, non-tender. BS normal. No masses, organomegaly  : Deferred  Musculoskeletal: extremities normal- no gross deformities noted, gait normal and normal muscle tone  Skin: no suspicious lesions or rashes  Neurologic: Shaking of the hands when seen. Sensation grossly WNL.  Psychiatric: mentation appears normal and affect normal/bright  Hematologic/Lymphatic/Immunologic: Normal cervical lymph nodes     Data   Results for orders placed or performed during the hospital encounter of 09/25/19 (from the past 24 hour(s))   CBC with platelets + differential   Result Value Ref Range    WBC 3.0 (L) 4.0 - 11.0 10e9/L    RBC Count 2.79 (L) 4.4 - 5.9 10e12/L    Hemoglobin 8.9 (L) 13.3 - 17.7 g/dL    Hematocrit 27.1 (L) 40.0 - 53.0 %    MCV 97 78 - 100 fl    MCH 31.9 26.5 - 33.0 pg    MCHC 32.8 31.5 - 36.5 g/dL    RDW 15.5 (H) 10.0 - 15.0 %    Platelet Count 67 (L) 150 - 450 10e9/L    Diff Method Automated Method     % Neutrophils 68.9 %    % Lymphocytes 14.0 %    % Monocytes 15.7 %    % Eosinophils 0.7 %     % Basophils 0.7 %    % Immature Granulocytes 0.0 %    Absolute Neutrophil 2.1 1.6 - 8.3 10e9/L    Absolute Lymphocytes 0.4 (L) 0.8 - 5.3 10e9/L    Absolute Monocytes 0.5 0.0 - 1.3 10e9/L    Absolute Eosinophils 0.0 0.0 - 0.7 10e9/L    Absolute Basophils 0.0 0.0 - 0.2 10e9/L    Abs Immature Granulocytes 0.0 0 - 0.4 10e9/L   Comprehensive metabolic panel   Result Value Ref Range    Sodium 136 133 - 144 mmol/L    Potassium 3.0 (L) 3.4 - 5.3 mmol/L    Chloride 101 94 - 109 mmol/L    Carbon Dioxide 28 20 - 32 mmol/L    Anion Gap 7 3 - 14 mmol/L    Glucose 92 70 - 99 mg/dL    Urea Nitrogen 17 7 - 30 mg/dL    Creatinine 0.89 0.66 - 1.25 mg/dL    GFR Estimate 90 >60 mL/min/[1.73_m2]    GFR Estimate If Black >90 >60 mL/min/[1.73_m2]    Calcium 7.5 (L) 8.5 - 10.1 mg/dL    Bilirubin Total 2.4 (H) 0.2 - 1.3 mg/dL    Albumin 2.5 (L) 3.4 - 5.0 g/dL    Protein Total 6.1 (L) 6.8 - 8.8 g/dL    Alkaline Phosphatase 268 (H) 40 - 150 U/L    ALT 34 0 - 70 U/L     (H) 0 - 45 U/L   INR   Result Value Ref Range    INR 1.35 (H) 0.86 - 1.14   Magnesium   Result Value Ref Range    Magnesium 0.9 (LL) 1.6 - 2.3 mg/dL   Alcohol ethyl   Result Value Ref Range    Ethanol g/dL <0.01 <0.01 g/dL   ABO/Rh type and screen   Result Value Ref Range    ABO A     RH(D) Neg     Antibody Screen Neg     Test Valid Only At [facility]        Specimen Expires 09/28/2019    EKG 12 lead   Result Value Ref Range    Interpretation ECG Click View Image link to view waveform and result    CBC with platelets   Result Value Ref Range    WBC 3.1 (L) 4.0 - 11.0 10e9/L    RBC Count 2.66 (L) 4.4 - 5.9 10e12/L    Hemoglobin 8.7 (L) 13.3 - 17.7 g/dL    Hematocrit 25.4 (L) 40.0 - 53.0 %    MCV 96 78 - 100 fl    MCH 32.7 26.5 - 33.0 pg    MCHC 34.3 31.5 - 36.5 g/dL    RDW 15.7 (H) 10.0 - 15.0 %    Platelet Count 59 (L) 150 - 450 10e9/L   Lactic acid level STAT   Result Value Ref Range    Lactate for Sepsis Protocol 2.0 0.7 - 2.0 mmol/L

## 2019-09-25 NOTE — ED NOTES
DATE:  9/25/2019   TIME OF RECEIPT FROM LAB:  1:10  LAB TEST:  magnesium  LAB VALUE:  0.9  RESULTS GIVEN WITH READ-BACK TO (PROVIDER):  Danae Obregon MD  TIME LAB VALUE REPORTED TO PROVIDER:   1:12

## 2019-09-25 NOTE — PROGRESS NOTES
SPIRITUAL HEALTH SERVICES Progress Note  [facility] 88    Brief visit with pt, per request in chart.  Pt was alert, eating lunch, when I arrived.  He expressed an interest in getting back to [facility] attendance when he's out of the hospital.  He also shared gratitude for how well the staff is caring for him here at [facility].  No other SH needs at present, but SH team is available if needs arise.                                                                                                                                                 Patricia Gaffney M.A.  Staff   Pager [phone]  Phone [phone]

## 2019-09-25 NOTE — PHARMACY-ADMISSION MEDICATION HISTORY
Admission medication history interview status for the 9/25/2019  admission is complete. See EPIC admission navigator for prior to admission medications     Medication history source reliability:Good    Actions taken by pharmacist (provider contacted, etc):None     Additional medication history information not noted on PTA med list :None    Medication reconciliation/reorder completed by provider prior to medication history? Yes    Time spent in this activity: 15 min    Prior to Admission medications    Medication Sig Last Dose Taking? Auth Provider   albuterol (PROAIR HFA/PROVENTIL HFA/VENTOLIN HFA) 108 (90 BASE) MCG/ACT Inhaler Inhale 2 puffs into the lungs every 6 hours as needed  Past Week at Unknown time Yes Unknown, Entered By History   DULoxetine (CYMBALTA) 60 MG EC capsule Take 1 capsule (60 mg) by mouth daily 9/24/2019 at am Yes Brody Rand MD   fluticasone-vilanterol (BREO ELLIPTA) 200-25 MCG/INH oral inhaler Inhale 1 puff into the lungs daily  9/24/2019 at am Yes Unknown, Entered By History   folic acid (FOLVITE) 1 MG tablet Take 1 tablet (1 mg) by mouth daily 9/24/2019 at am Yes Elena Chiang MD   furosemide (LASIX) 40 MG tablet Take 1 tablet (40 mg) by mouth daily 9/24/2019 at am Yes Elena Chiang MD   mirtazapine (REMERON) 15 MG tablet Take 15 mg by mouth At Bedtime 9/24/2019 at Unknown time Yes Unknown, Entered By History   multivitamin, therapeutic with minerals (THERA-VIT-M) TABS tablet Take 1 tablet by mouth daily 9/24/2019 at am Yes Jude Duarte DO   nicotine (NICODERM CQ) 21 MG/24HR 24 hr patch Place 1 patch onto the skin daily 9/24/2019 at Unknown time Yes Elena Chiang MD   pantoprazole (PROTONIX) 40 MG EC tablet Take 1 tablet (40 mg) by mouth every morning (before breakfast) 9/24/2019 at am Yes Elena Chiang MD   propranolol (INDERAL) 10 MG tablet Take 1 tablet (10 mg) by mouth 2 times daily 9/24/2019 at Unknown time Yes Elena Chiang MD   QUEtiapine  (SEROQUEL) 50 MG tablet Take 50 mg by mouth At Bedtime  9/24/2019 at hs Yes Unknown, Entered By History   spironolactone (ALDACTONE) 25 MG tablet Take 2 tablets (50 mg) by mouth daily 9/24/2019 at am Yes Elena Chiang MD   tamsulosin (FLOMAX) 0.4 MG capsule Take 0.4 mg by mouth daily 9/24/2019 at am Yes Unknown, Entered By History   vitamin B1 (THIAMINE) 100 MG tablet Take 1 tablet (100 mg) by mouth daily 9/24/2019 at am Yes Elena Chiang MD   order for DME Equipment being ordered: Walker Wheels () and Walker ()  Treatment Diagnosis: difficulty walking   Donell Lucas MD

## 2019-09-25 NOTE — ED NOTES
Bed: ED19  Expected date: 9/25/19  Expected time: 12:10 AM  Means of arrival: Ambulance  Comments:  HEMS 414 64M alcohol withdrawal

## 2019-09-26 ENCOUNTER — APPOINTMENT (OUTPATIENT)
Dept: PHYSICAL THERAPY | Facility: CLINIC | Age: 64
DRG: 896 | End: 2019-09-26
Attending: INTERNAL MEDICINE
Payer: MEDICARE

## 2019-09-26 LAB
ANION GAP SERPL CALCULATED.3IONS-SCNC: 6 MMOL/L (ref 3–14)
BUN SERPL-MCNC: 16 MG/DL (ref 7–30)
CALCIUM SERPL-MCNC: 8 MG/DL (ref 8.5–10.1)
CHLORIDE SERPL-SCNC: 101 MMOL/L (ref 94–109)
CO2 SERPL-SCNC: 27 MMOL/L (ref 20–32)
CREAT SERPL-MCNC: 1.02 MG/DL (ref 0.66–1.25)
ERYTHROCYTE [DISTWIDTH] IN BLOOD BY AUTOMATED COUNT: 15.4 % (ref 10–15)
GFR SERPL CREATININE-BSD FRML MDRD: 76 ML/MIN/{1.73_M2}
GLUCOSE SERPL-MCNC: 108 MG/DL (ref 70–99)
HCT VFR BLD AUTO: 28 % (ref 40–53)
HGB BLD-MCNC: 9.6 G/DL (ref 13.3–17.7)
LACTATE BLD-SCNC: 1.3 MMOL/L (ref 0.7–2)
LACTATE BLD-SCNC: 3.2 MMOL/L (ref 0.7–2)
MAGNESIUM SERPL-MCNC: 1.8 MG/DL (ref 1.6–2.3)
MCH RBC QN AUTO: 33 PG (ref 26.5–33)
MCHC RBC AUTO-ENTMCNC: 34.3 G/DL (ref 31.5–36.5)
MCV RBC AUTO: 96 FL (ref 78–100)
PLATELET # BLD AUTO: 68 10E9/L (ref 150–450)
POTASSIUM SERPL-SCNC: 3.5 MMOL/L (ref 3.4–5.3)
RBC # BLD AUTO: 2.91 10E12/L (ref 4.4–5.9)
SODIUM SERPL-SCNC: 134 MMOL/L (ref 133–144)
WBC # BLD AUTO: 3 10E9/L (ref 4–11)

## 2019-09-26 PROCEDURE — C9113 INJ PANTOPRAZOLE SODIUM, VIA: HCPCS | Performed by: HOSPITALIST

## 2019-09-26 PROCEDURE — 12000000 ZZH R&B MED SURG/OB

## 2019-09-26 PROCEDURE — 25000132 ZZH RX MED GY IP 250 OP 250 PS 637: Mod: GY | Performed by: INTERNAL MEDICINE

## 2019-09-26 PROCEDURE — 97530 THERAPEUTIC ACTIVITIES: CPT | Mod: GP | Performed by: PHYSICAL THERAPIST

## 2019-09-26 PROCEDURE — 85027 COMPLETE CBC AUTOMATED: CPT | Performed by: HOSPITALIST

## 2019-09-26 PROCEDURE — 83605 ASSAY OF LACTIC ACID: CPT | Performed by: INTERNAL MEDICINE

## 2019-09-26 PROCEDURE — 97116 GAIT TRAINING THERAPY: CPT | Mod: GP | Performed by: PHYSICAL THERAPIST

## 2019-09-26 PROCEDURE — 25000132 ZZH RX MED GY IP 250 OP 250 PS 637: Mod: GY | Performed by: HOSPITALIST

## 2019-09-26 PROCEDURE — 36415 COLL VENOUS BLD VENIPUNCTURE: CPT | Performed by: HOSPITALIST

## 2019-09-26 PROCEDURE — 25000128 H RX IP 250 OP 636: Performed by: INTERNAL MEDICINE

## 2019-09-26 PROCEDURE — 97162 PT EVAL MOD COMPLEX 30 MIN: CPT | Mod: GP | Performed by: PHYSICAL THERAPIST

## 2019-09-26 PROCEDURE — 25000125 ZZHC RX 250

## 2019-09-26 PROCEDURE — 36415 COLL VENOUS BLD VENIPUNCTURE: CPT | Performed by: INTERNAL MEDICINE

## 2019-09-26 PROCEDURE — 99232 SBSQ HOSP IP/OBS MODERATE 35: CPT | Performed by: INTERNAL MEDICINE

## 2019-09-26 PROCEDURE — 83735 ASSAY OF MAGNESIUM: CPT | Performed by: HOSPITALIST

## 2019-09-26 PROCEDURE — 80048 BASIC METABOLIC PNL TOTAL CA: CPT | Performed by: HOSPITALIST

## 2019-09-26 PROCEDURE — 25000128 H RX IP 250 OP 636: Performed by: HOSPITALIST

## 2019-09-26 PROCEDURE — 40000863 ZZH STATISTIC RADIOLOGY XRAY, US, CT, MAR, NM

## 2019-09-26 RX ORDER — LIDOCAINE HYDROCHLORIDE 20 MG/ML
10 JELLY TOPICAL ONCE
Status: COMPLETED | OUTPATIENT
Start: 2019-09-26 | End: 2019-09-26

## 2019-09-26 RX ORDER — LIDOCAINE HYDROCHLORIDE 20 MG/ML
JELLY TOPICAL
Status: COMPLETED
Start: 2019-09-26 | End: 2019-09-26

## 2019-09-26 RX ORDER — PANTOPRAZOLE SODIUM 40 MG/1
40 TABLET, DELAYED RELEASE ORAL
Status: DISCONTINUED | OUTPATIENT
Start: 2019-09-26 | End: 2019-10-01 | Stop reason: HOSPADM

## 2019-09-26 RX ORDER — TAMSULOSIN HYDROCHLORIDE 0.4 MG/1
0.4 CAPSULE ORAL DAILY
Status: DISCONTINUED | OUTPATIENT
Start: 2019-09-26 | End: 2019-09-26

## 2019-09-26 RX ADMIN — MIRTAZAPINE 15 MG: 15 TABLET, FILM COATED ORAL at 21:59

## 2019-09-26 RX ADMIN — FOLIC ACID 1 MG: 1 TABLET ORAL at 08:41

## 2019-09-26 RX ADMIN — FLUTICASONE FUROATE AND VILANTEROL TRIFENATATE 1 PUFF: 200; 25 POWDER RESPIRATORY (INHALATION) at 13:31

## 2019-09-26 RX ADMIN — LORAZEPAM 1 MG: 1 TABLET ORAL at 20:43

## 2019-09-26 RX ADMIN — LIDOCAINE HYDROCHLORIDE 6 ML: 20 JELLY TOPICAL at 06:50

## 2019-09-26 RX ADMIN — LORAZEPAM 2 MG: 2 INJECTION INTRAMUSCULAR; INTRAVENOUS at 19:50

## 2019-09-26 RX ADMIN — DULOXETINE HYDROCHLORIDE 60 MG: 60 CAPSULE, DELAYED RELEASE ORAL at 08:41

## 2019-09-26 RX ADMIN — Medication 100 MG: at 08:41

## 2019-09-26 RX ADMIN — SPIRONOLACTONE 50 MG: 25 TABLET ORAL at 08:41

## 2019-09-26 RX ADMIN — PANTOPRAZOLE SODIUM 40 MG: 40 TABLET, DELAYED RELEASE ORAL at 17:01

## 2019-09-26 RX ADMIN — LORAZEPAM 1 MG: 1 TABLET ORAL at 16:18

## 2019-09-26 RX ADMIN — FUROSEMIDE 40 MG: 40 TABLET ORAL at 08:41

## 2019-09-26 RX ADMIN — SODIUM CHLORIDE 500 ML: 9 INJECTION, SOLUTION INTRAVENOUS at 17:46

## 2019-09-26 RX ADMIN — LORAZEPAM 2 MG: 1 TABLET ORAL at 12:08

## 2019-09-26 RX ADMIN — QUETIAPINE 50 MG: 25 TABLET ORAL at 21:59

## 2019-09-26 RX ADMIN — LORAZEPAM 2 MG: 1 TABLET ORAL at 17:40

## 2019-09-26 RX ADMIN — TAMSULOSIN HYDROCHLORIDE 0.4 MG: 0.4 CAPSULE ORAL at 08:41

## 2019-09-26 RX ADMIN — NICOTINE 1 PATCH: 21 PATCH, EXTENDED RELEASE TRANSDERMAL at 08:41

## 2019-09-26 RX ADMIN — LORAZEPAM 2 MG: 1 TABLET ORAL at 13:31

## 2019-09-26 RX ADMIN — PANTOPRAZOLE SODIUM 40 MG: 40 INJECTION, POWDER, LYOPHILIZED, FOR SOLUTION INTRAVENOUS at 08:41

## 2019-09-26 RX ADMIN — MULTIPLE VITAMINS W/ MINERALS TAB 1 TABLET: TAB at 08:41

## 2019-09-26 ASSESSMENT — ACTIVITIES OF DAILY LIVING (ADL)
ADLS_ACUITY_SCORE: 21
ADLS_ACUITY_SCORE: 20
ADLS_ACUITY_SCORE: 21
ADLS_ACUITY_SCORE: 21
ADLS_ACUITY_SCORE: 20
ADLS_ACUITY_SCORE: 20

## 2019-09-26 NOTE — PLAN OF CARE
A/O x 4, with forgetfulness, vss, a-febrile, denies pain, patient admitted with GI bleed, no bleed since admission this morning, patient had EGD which was normal, no bleed source noted, patient had paracentesis this afternoon through left lower abdomen, 3500 ML fluid was taken out, patient denies Shortness of breath, puncture site CDI, no bleeding from the site.up with assist of two gait belt and walker to bedside commode.

## 2019-09-26 NOTE — PLAN OF CARE
[id number]: Pt is A/o to self only. Confused, agitated and restless. CIWA 09, 20,07--Ativan given with some relief. Afebrile, VSS, on RA. Lactic 3.2, MD notified, 500 ml NS bolus infusing, recheck Lactic at 2000. Up with Assist of 1 and walker, ambulating in halls. 2g Na, good appetite. Denies nausea. Voiding adequately in BR. No sign of bleeding, Hgb 9.6. Plan to transfer pt to station 66. Report given to 66 RN.

## 2019-09-26 NOTE — PROGRESS NOTES
[facility]    Medicine Progress Note - Hospitalist Service        Date of Admission:  9/25/2019 12:13 AM    Assessment & Plan:   Jim Richard is a pleasant 64 year old male with history of alcoholic liver disease, ulcers, esophageal varices, CAD, hypertension, hyperlipidemia, peripheral vascular disease, tobacco use, subdural hematoma, and chronic low back pain who presented to the ED with generalized weakness. He also reports melena x 2 days.     Suspected Upper GI bleed  Anemia, acute on chronic  History of duodenal ulcer and esophageal varices  -Presented with reported melena  -Hgb 8.9 on admission, slightly lower than recent baseline of around 9.5-10  -Evaluated by GI on 9/25, upper GI endoscopy without evidence of bleeding  -Hemoglobin stable at 9.6  -Protonix 40 mg p.o. twice daily     Acute Alcohol withdrawal  Alcoholic liver disease  Ascites - history of paracentesis  -last drink 6 days ago, ongoing tremors, appears chronic.  No history of seizure  -Continue to monitor clinically, no active evidence of withdrawal at this time.  Remains on CIWA protocol.  -Status post therapeutic paracentesis yesterday with removal of 3500 mL of straw-colored fluid  -Patient unsure if he needs new help with his chronic alcohol use, chemical dependency counselor consult today.     Hypokalemia and hypomagnesemia  -replete and monitor per protocol      Tobacco abuse  -pack a day smoker  -21 mg patch ordered per request  -encouraged cessation     Depression and Anxiety  -Continue PTA regimen once able to take orally    Pancytopenia  -likely secondary to chronic alcoholism (bone marrow suppression) and gi bleed    Generalized deconditioning:  -He appears weak overall, evaluated by physical therapy today, they are recommending TCU.       Diet: 2 Gram Sodium Diet     DVT Prophylaxis: Pneumatic Compression Devices   Leger Catheter: not present  Code Status: DNR/DNI     Disposition Plan    Expected discharge: 2  "- 3 days, recommended to TBD.   Entered: Luis Bateman MD 09/26/2019, 8:39 AM        The patient's care was discussed with the Bedside Nurse and Patient.    Luis Bateman MD  Hospitalist Service  [facility]    ______________________________________________________________________    Interval History   Status post paracentesis yesterday feels better in that regard.  Hemoglobin is stable.  No further melena.  Required benzo x1 earlier    Data reviewed today: I reviewed all medications, new labs and imaging results over the last 24 hours. I personally reviewed no images or EKG's today.    Physical Exam   Vital signs:  Temp: 98.6  F (37  C) Temp src: Oral BP: 134/76 Pulse: 72 Heart Rate: 72 Resp: 16 SpO2: 97 % O2 Device: None (Room air)     Weight: 78.4 kg (172 lb 12.8 oz)  Estimated body mass index is 27.06 kg/m  as calculated from the following:    Height as of 7/10/19: 1.702 m (5' 7\").    Weight as of this encounter: 78.4 kg (172 lb 12.8 oz).      Wt Readings from Last 2 Encounters:   09/26/19 78.4 kg (172 lb 12.8 oz)   08/13/19 84 kg (185 lb 3 oz)       Gen: AAOX2-3, NAD  HEENT:  no pallor  Resp: CTA B/L, normal WOB  CVS: RRR, no murmur  Abd/GI: Soft, mild distention, no tenderness.  skin: Warm, dry no rashes  MSK: No joint deformities, trace pedal edema  Neuro- CN- intact. No focal deficits.  Mild upper extremity tremors.      Data   Recent Labs   Lab 09/26/19  0305 09/25/19  0954 09/25/19  0320 09/25/19  0025   WBC 3.0*  --  3.1* 3.0*   HGB 9.6* 8.4* 8.7* 8.9*   MCV 96  --  96 97   PLT 68*  --  59* 67*   INR  --   --   --  1.35*     --   --  136   POTASSIUM 3.5 3.0*  --  3.0*   CHLORIDE 101  --   --  101   CO2 27  --   --  28   BUN 16  --   --  17   CR 1.02  --   --  0.89   ANIONGAP 6  --   --  7   KEV 8.0*  --   --  7.5*   *  --   --  92   ALBUMIN  --   --   --  2.5*   PROTTOTAL  --   --   --  6.1*   BILITOTAL  --   --   --  2.4*   ALKPHOS  --   --   --  268*   ALT  --   --   " --  34   AST  --   --   --  147*       No results found for this or any previous visit (from the past 24 hour(s)).  Medications     - MEDICATION INSTRUCTIONS -         DULoxetine  60 mg Oral Daily     fluticasone-vilanterol  1 puff Inhalation Daily     folic acid  1 mg Oral Daily     furosemide  40 mg Oral Daily     mirtazapine  15 mg Oral At Bedtime     multivitamin w/minerals  1 tablet Oral Daily     nicotine  1 patch Transdermal Daily     nicotine   Transdermal Q8H     nicotine   Transdermal Daily     pantoprazole (PROTONIX) IV  40 mg Intravenous BID     QUEtiapine  50 mg Oral At Bedtime     sodium chloride (PF)  3 mL Intracatheter Q8H     spironolactone  50 mg Oral Daily     tamsulosin  0.4 mg Oral Daily     vitamin B1  100 mg Oral Daily

## 2019-09-26 NOTE — PROGRESS NOTES
Sepsis Evaluation Progress Note    I was called to see Jim Richard due to abnormal vital signs triggering the Sepsis SIRS screening alert. He is not known to have an infection.     Physical Exam   Vital Signs:  Temp: 97.3  F (36.3  C) Temp src: Oral BP: (!) 142/84 Pulse: 85 Heart Rate: 110 Resp: 18 SpO2: 97 % O2 Device: None (Room air)      Lab:  Lactic Acid   Date Value Ref Range Status   08/01/2019 1.2 0.7 - 2.1 mmol/L Final     Lactate for Sepsis Protocol   Date Value Ref Range Status   09/26/2019 3.2 (H) 0.7 - 2.0 mmol/L Final     Comment:     Significant value called to and read back by  VAUGHN RDZ RN IN 88 @ 1703 WD         The patient is at baseline mental status.     The rest of their physical exam is significant for     Assessment & Plan   No evidence of sepsis. Findings likely due to alcohol withdrawal. Will give NS 500ml X1 and recheck lactate in 3 hrs.    Disposition: The patient will remain on the current unit. We will continue to monitor this patient closely.  Luis Bateman MD    Sepsis Criteria   Sepsis: 2+ SIRS criteria due to infection  Severe Sepsis: Sepsis AND 1+ new sign of acute organ dysfunction (Note: lactate >2 is organ dysfunction)  Septic Shock: Sepsis AND hypotension despite volume resuscitation with 30 ml/kg crystalloid

## 2019-09-26 NOTE — PLAN OF CARE
Patient had VSS, denied any chest pain and SOB. Patient confused and forgetful, oriented to self and forgetful with the rest. Wanted to go out to smoke, MD and RN talked to him in to staying. Can be put on a 72 hold if he tries to leave due to unsteadiness and mentation not at baseline. Patient has great appetite, up to chair. Urinating well. EGD yesterday, negative for ulcers. No signs of bleeding. No BM this shift. Had paracentesis yesterday, took out 3500 ml, remains distended. No signs of NICOLE. CIWA scoring was 5, 13 and 13, gave 4 mg PO ativan with some relief. Continue to monitor.

## 2019-09-26 NOTE — PROGRESS NOTES
[facility]    Hospitalist Progress Note  Interval History    Doing well but upset that he couldn't get his trazodone at night to go to sleep. Still shaking from Etoh withdrawal but appears to be calm.    All other review of systems are negative.    Assessment & Plan   Jim Richard is a 64 year old male who was admitted on 9/25/2019. GI has been following the pt for melena and decompensated cirrhosis due to Etoh abuse.    Melena: resolved  -s/p EGD w/ portal hypertensive gastropathy, no varices or bleeding found  -c/w PO PPI daily for primary prophylaxis    Ascites:  -s/p therapeutic paracentesis with ~3.5L removed  -currently on 40 lasix and 50 of aldactone, can increase aldactone to 100 mg if potassium continues to go down with lasix     Hepatic encephalopathy:  -c/w lactulose and titrate to 2-3 BM per day  -please note pt was really sensitive to benzo last visit and may get iatrogenic encephalopathy from WA protocol, will recommend to be cautious about the dosing    Etoh:  -similar presentation in last admission which the pt failed to cut it cold turkey hence he will definitely benefit from some help    Code Status: DNR/DNI    -Data reviewed today: I reviewed all new labs and imaging results over the last 24 hours.     Physical Exam   Temp: 96.5  F (35.8  C) Temp src: Oral BP: 137/77 Pulse: 72 Heart Rate: 104 Resp: 16 SpO2: 97 % O2 Device: None (Room air)    Vitals:    09/25/19 0025 09/26/19 0500   Weight: 86.2 kg (190 lb) 78.4 kg (172 lb 12.8 oz)     Vital Signs with Ranges  Temp:  [96.5  F (35.8  C)-98.6  F (37  C)] 96.5  F (35.8  C)  Pulse:  [72-83] 72  Heart Rate:  [] 104  Resp:  [16] 16  BP: (124-137)/(68-77) 137/77  SpO2:  [96 %-97 %] 97 %  I/O last 3 completed shifts:  In: -   Out: 1450 [Urine:1450]    Constitutional: Awake, alert, cooperative, no apparent distress  Respiratory: Clear to auscultation bilaterally, no crackles or wheezing  Cardiovascular: Regular rate and  rhythm, normal S1 and S2, and no murmur noted  GI: Normal bowel sounds, soft, distended w/ fluid wave, non-tender  Skin/Integumen: No rashes, no cyanosis, no edema  Other:     MD Srinivas Frye)  [facility]  Office: [phone]  Cell: [phone], please feel free to call the cell    Medications     - MEDICATION INSTRUCTIONS -         DULoxetine  60 mg Oral Daily     fluticasone-vilanterol  1 puff Inhalation Daily     folic acid  1 mg Oral Daily     furosemide  40 mg Oral Daily     mirtazapine  15 mg Oral At Bedtime     multivitamin w/minerals  1 tablet Oral Daily     nicotine  1 patch Transdermal Daily     nicotine   Transdermal Q8H     nicotine   Transdermal Daily     pantoprazole  40 mg Oral BID AC     QUEtiapine  50 mg Oral At Bedtime     sodium chloride (PF)  3 mL Intracatheter Q8H     spironolactone  50 mg Oral Daily     tamsulosin  0.4 mg Oral Daily     vitamin B1  100 mg Oral Daily       Data   Recent Labs   Lab 09/26/19  0305 09/25/19  0954 09/25/19  0320 09/25/19  0025   WBC 3.0*  --  3.1* 3.0*   HGB 9.6* 8.4* 8.7* 8.9*   MCV 96  --  96 97   PLT 68*  --  59* 67*   INR  --   --   --  1.35*     --   --  136   POTASSIUM 3.5 3.0*  --  3.0*   CHLORIDE 101  --   --  101   CO2 27  --   --  28   BUN 16  --   --  17   CR 1.02  --   --  0.89   ANIONGAP 6  --   --  7   KEV 8.0*  --   --  7.5*   *  --   --  92   ALBUMIN  --   --   --  2.5*   PROTTOTAL  --   --   --  6.1*   BILITOTAL  --   --   --  2.4*   ALKPHOS  --   --   --  268*   ALT  --   --   --  34   AST  --   --   --  147*       No results found for this or any previous visit (from the past 24 hour(s)).     Pavan Arguello MD (Richard)  [facility]  Office: [phone]  Cell: [phone]

## 2019-09-26 NOTE — PLAN OF CARE
Primary Diagnosis: generalized weakness  Orientation: A&Ox4  Aggression Stop Light: Green  Mobility: Ax2, GB and walker/lift  Pain Management: none ordered  Diet: 2 gm sodium diet   Bowel/Bladder: continent, bladder scanned multiple times, due to void  Abnormal Lab/Assessments: Magnesium 1.8 after recheck, K replaced 3.5 after recheck, CIWA - 8, 6, 6   Drain/Device/Wound: PIV SL   Consults: GI  D/C Day/Goals/Place: pending    Shift Note:   Pt A&OX4, confused during the night. VSS on Ra. PIV SL. Up with assist of 2, gb walker to commode. Voiding multiple times, bladder scanned around 300mls. Pt has visible tremors, CIWA scores of 8 (1mg Ativan given), 5,5. Denies pain. Tolerating 2 g sodium diet. Mg recheck at 1.8, K replaced with recheck at 3.5. Paracentesis with 3500mL taken out yesterday. Discharge pending. Continue to monitor.

## 2019-09-26 NOTE — CONSULTS
9/26/19 chem dep consult acknowledged. Consulted with unit  regarding patients' desires for chem dep services due to patient being limited to programs with his Medicare funding.  Isabel Diaz, confirmed patient is interested in meeting with chem dep.  He will be seen tomorrow by coverage counselor.

## 2019-09-26 NOTE — CONSULTS
Care Transition Initial Assessment - SW     Met with: PATIENT   Active Problems:    Kellie       DATA  Lives With: other (see comments)(roommate)   Living Arrangements: house  Quality of Family Relationships: supportive, involved  Who is your support system?: Sibling(s)  Identified issues/concerns regarding health management: SW following for discharge needs  Quality of Family Relationships: supportive, involved  Transportation Anticipated: car, drives self    ASSESSMENT  Cognitive Status:  Alert and oriented  Concerns to be addressed: Patient is a 64 year old male who was admitted to the hospital for a GI Bleed. Prior to hospitalization patient was living at home with a roommate where he reports he was doing well. Patient has a significant ETOH history but reports that he has cut back to about 2 days a week vs daily drinking. SW met with patient to discuss CD treatment as well as TCU placement. Patient discusses the desire to having the CD counselor meet with patient in the hospital and would maybe be interested in doing outpatient CD treatment. SW updated CD counselor. SW also discussed the need for TCU at discharge. Patient is agreeable to having referrals sent to [facility]. SW will send referrals and will update patient once assessments are complete.     ADDENDUM  I: Dania from [facility] can accept patient and would like to discuss their no alcohol policy with patient. SW will update pt in AM and schedule a meeting time with Dania.        PLAN  Financial costs for the patient includes   Patient given options and choices for discharge to TCU  Patient/family is agreeable to the plan?  YES  Transportation/person available to transport on day of discharge  is  and have they been notified/set up   Patient Goals and Preferences: [facility]  Patient anticipates discharging to:  TCU    Isabel Diaz, JULIA, LGSW  *[phone]

## 2019-09-26 NOTE — PLAN OF CARE
Discharge Planner PT   Patient plan for discharge: Pt says he cannot go home this shaky but thinks he can go home when better  Current status: Orders received. Chart reviewed. Evaluation completed and treatment initiated. Pt is a 64 year old male admitted for upper GI bleed. Pt has history of alcoholic liver disease, SDH, ulcers. Pt lives in house with roommate downstairs. Stairs in home, uses walker outside of home. 3 falls in past 6 months not using walker.     Pt stood from bed with Sarah and pushes legs against bed. Pt sat in chair with cues for safety and CGA. Pt repeated sit to stand from chair and required CGA/Sarah with use of walker for support. Pt could only do 6 sit to stands in a minute and was progressively weaker with each attempt and needed increased assist for balance. Pt ambulated with walker and CGA/Sarah for 125ft with decreased foot clearance and step length with mild ataxia. Pt required Sarah using walker to transfer chair to bed.   Barriers to return to prior living situation: stairs, must be independent with walker  Recommendations for discharge: TCU  Rationale for recommendations: During today's assessment, pt is below baseline and is a fall risk for transfers and ambulation. Pt needs to use walker all the time, not just when out of apartment. Pt is making progress with mobility but is not independent and therefore TCU is recommended to give pt therapy to maximize functional level. Pt may reach more independent level with walker by time of discharge and may only need walker and assist for bathing and home PT.       Entered by: Andreina Thrasher 09/26/2019 10:19 AM

## 2019-09-26 NOTE — PROGRESS NOTES
Patient apparently expressing desire to leave AMA for smoking.  I went and talked to him and emphasized that he is weak, physical therapy is recommending TCU and that would be the safest thing to do.  He appears a little more tremulous at the moment.  He is awake and alert but orientation is questionable at this time.  I will consult psychiatry  He is agreeable to staying for now after I offered him some nicotine gum/lozenges  If he threatens to leave, he will need to be placed on hold until evaluated by psychiatry.  Discussed with VASQUEZ Lee

## 2019-09-26 NOTE — PROGRESS NOTES
" visited with pt who was visibly shaky and at times confused about where he was and about sounds and voices he was hearing outside the room. Pt. Attempted to leave his room during visit triggering alarm. Nurse came to get him back into bed. Pt complained to  that he didn't like people \"telling him what to do\".  provided listening and said Serenity Prayer and Lord's prayer along with pt, who was familiar with these prayers. Spiritual health will follow up as available or upon request.    "

## 2019-09-26 NOTE — PROGRESS NOTES
09/26/19 0959   Quick Adds   Type of Visit Initial PT Evaluation   Living Environment   Lives With other (see comments)  (roommate)   Living Arrangements house   Home Accessibility stairs to enter home;stairs within home   Number of Stairs, Within Home, Primary other (see comments)  (12)   Stair Railings, Within Home, Primary railing on left side (ascending)   Transportation Anticipated car, drives self   Living Environment Comment has a roommate that lives in lower level. Has tub with grab bars   Self-Care   Usual Activity Tolerance moderate   Current Activity Tolerance fair   Equipment Currently Used at Home grab bar, tub/shower;walker, rolling   Activity/Exercise/Self-Care Comment uses walker when out of house, furniture walks in house, independent with ADLs, drives, shops, cooks   Functional Level Prior   Ambulation 1-->assistive equipment   Transferring 0-->independent   Toileting 0-->independent   Bathing 1-->assistive equipment   Communication 0-->understands/communicates without difficulty   Swallowing 0-->swallows foods/liquids without difficulty   Cognition 1 - attention or memory deficits   Fall history within last six months yes   Number of times patient has fallen within last six months 3   Which of the above functional risks had a recent onset or change? ambulation;transferring;fall history   General Information   Onset of Illness/Injury or Date of Surgery - Date 09/25/19   Referring Physician Dr. Ledesma   Patient/Family Goals Statement Pt says he cannot go home this shaky   Pertinent History of Current Problem (include personal factors and/or comorbidities that impact the POC) Pt is a 64 year old male admitted for upper GI bleed and s/p paracentesis. Pt has history of alcoholic liver disease, ulcers, CAD, HTN, SDH, tobacco use   Precautions/Limitations fall precautions   Weight-Bearing Status - LLE full weight-bearing   Weight-Bearing Status - RLE full weight-bearing   General Info Comments Pt just  woke up and thought he was home   Cognitive Status Examination   Orientation person   Level of Consciousness alert   Follows Commands and Answers Questions 100% of the time   Personal Safety and Judgment intact   Memory impaired   Pain Assessment   Patient Currently in Pain No   Range of Motion (ROM)   ROM Comment Extremities WFL, but decreased LE hip flexion due to discomfort   Strength   Strength Comments Generalized weakness and decreased hip flex 3+/5 DF 4+/5   Bed Mobility   Bed Mobility Comments independent   Transfer Skills   Transfer Comments Sit to stand: CGA/Sarah. see flowsheet   Gait   Gait Comments Pt ambulated wiht wh walker Sarah with decreased step length right less than left. Decreased foot clearance, decreased cathy and mild ataxia.   Balance   Balance Comments requires support for static and dynamic mobility   Sensory Examination   Sensory Perception Comments Decreased sensation knee to feet bilat with right decreased more than left   General Therapy Interventions   Planned Therapy Interventions balance training;gait training;neuromuscular re-education;strengthening;transfer training;progressive activity/exercise   Clinical Impression   Criteria for Skilled Therapeutic Intervention yes, treatment indicated   PT Diagnosis Difficulty with gait   Influenced by the following impairments Decreased sensation., decreased strength, decreased balance and endurance   Functional limitations due to impairments increased assist for mobility compared to baseline, fall risk   Clinical Presentation Evolving/Changing   Clinical Presentation Rationale clinical judgement   Clinical Decision Making (Complexity) Moderate complexity   Therapy Frequency Daily   Predicted Duration of Therapy Intervention (days/wks) 3 days   Anticipated Discharge Disposition Transitional Care Facility   Risk & Benefits of therapy have been explained Yes   Patient, Family & other staff in agreement with plan of care Yes   Clinical  "Impression Comments Pt may be able to go home with assist and home care if mobility progresses   [facility] AM-PAC TM \"6 Clicks\"   2016, Trustees of [facility], under license to [employer].  All rights reserved.   6 Clicks Short Forms Basic Mobility Inpatient Short Form   [facility] AM-PAC  \"6 Clicks\" V.2 Basic Mobility Inpatient Short Form   1. Turning from your back to your side while in a flat bed without using bedrails? 4 - None   2. Moving from lying on your back to sitting on the side of a flat bed without using bedrails? 4 - None   3. Moving to and from a bed to a chair (including a wheelchair)? 3 - A Little   4. Standing up from a chair using your arms (e.g., wheelchair, or bedside chair)? 3 - A Little   5. To walk in hospital room? 3 - A Little   6. Climbing 3-5 steps with a railing? 3 - A Little   Basic Mobility Raw Score (Score out of 24.Lower scores equate to lower levels of function) 20   Total Evaluation Time   Total Evaluation Time (Minutes) 12     "

## 2019-09-27 ENCOUNTER — APPOINTMENT (OUTPATIENT)
Dept: PHYSICAL THERAPY | Facility: CLINIC | Age: 64
DRG: 896 | End: 2019-09-27
Attending: INTERNAL MEDICINE
Payer: MEDICARE

## 2019-09-27 PROCEDURE — 99232 SBSQ HOSP IP/OBS MODERATE 35: CPT | Performed by: INTERNAL MEDICINE

## 2019-09-27 PROCEDURE — 97530 THERAPEUTIC ACTIVITIES: CPT | Mod: GP | Performed by: PHYSICAL THERAPIST

## 2019-09-27 PROCEDURE — 25000128 H RX IP 250 OP 636: Performed by: HOSPITALIST

## 2019-09-27 PROCEDURE — 97116 GAIT TRAINING THERAPY: CPT | Mod: GP | Performed by: PHYSICAL THERAPIST

## 2019-09-27 PROCEDURE — 25000132 ZZH RX MED GY IP 250 OP 250 PS 637: Mod: GY | Performed by: HOSPITALIST

## 2019-09-27 PROCEDURE — 12000000 ZZH R&B MED SURG/OB

## 2019-09-27 PROCEDURE — 25000132 ZZH RX MED GY IP 250 OP 250 PS 637: Mod: GY | Performed by: INTERNAL MEDICINE

## 2019-09-27 RX ORDER — LACTULOSE 10 G/15ML
20 SOLUTION ORAL 3 TIMES DAILY
Status: DISCONTINUED | OUTPATIENT
Start: 2019-09-27 | End: 2019-09-30

## 2019-09-27 RX ORDER — LACTULOSE 10 G/15ML
20 SOLUTION ORAL 3 TIMES DAILY
Status: DISCONTINUED | OUTPATIENT
Start: 2019-09-27 | End: 2019-09-27

## 2019-09-27 RX ADMIN — Medication 100 MG: at 09:11

## 2019-09-27 RX ADMIN — SPIRONOLACTONE 50 MG: 25 TABLET ORAL at 09:11

## 2019-09-27 RX ADMIN — LORAZEPAM 2 MG: 2 INJECTION INTRAMUSCULAR; INTRAVENOUS at 02:25

## 2019-09-27 RX ADMIN — LACTULOSE 20 G: 20 SOLUTION ORAL at 21:53

## 2019-09-27 RX ADMIN — MIRTAZAPINE 15 MG: 15 TABLET, FILM COATED ORAL at 21:53

## 2019-09-27 RX ADMIN — FOLIC ACID 1 MG: 1 TABLET ORAL at 09:11

## 2019-09-27 RX ADMIN — LORAZEPAM 1 MG: 2 INJECTION INTRAMUSCULAR; INTRAVENOUS at 03:07

## 2019-09-27 RX ADMIN — FUROSEMIDE 40 MG: 40 TABLET ORAL at 09:12

## 2019-09-27 RX ADMIN — MULTIPLE VITAMINS W/ MINERALS TAB 1 TABLET: TAB at 09:11

## 2019-09-27 RX ADMIN — DULOXETINE HYDROCHLORIDE 60 MG: 60 CAPSULE, DELAYED RELEASE ORAL at 09:11

## 2019-09-27 RX ADMIN — LORAZEPAM 1 MG: 1 TABLET ORAL at 00:18

## 2019-09-27 RX ADMIN — LORAZEPAM 1 MG: 1 TABLET ORAL at 01:37

## 2019-09-27 RX ADMIN — QUETIAPINE 50 MG: 25 TABLET ORAL at 21:53

## 2019-09-27 RX ADMIN — FLUTICASONE FUROATE AND VILANTEROL TRIFENATATE 1 PUFF: 200; 25 POWDER RESPIRATORY (INHALATION) at 12:50

## 2019-09-27 RX ADMIN — PANTOPRAZOLE SODIUM 40 MG: 40 TABLET, DELAYED RELEASE ORAL at 09:11

## 2019-09-27 RX ADMIN — NICOTINE 1 PATCH: 21 PATCH, EXTENDED RELEASE TRANSDERMAL at 09:10

## 2019-09-27 RX ADMIN — LACTULOSE 20 G: 20 SOLUTION ORAL at 15:09

## 2019-09-27 RX ADMIN — TAMSULOSIN HYDROCHLORIDE 0.4 MG: 0.4 CAPSULE ORAL at 09:11

## 2019-09-27 RX ADMIN — PANTOPRAZOLE SODIUM 40 MG: 40 TABLET, DELAYED RELEASE ORAL at 16:28

## 2019-09-27 ASSESSMENT — ACTIVITIES OF DAILY LIVING (ADL)
ADLS_ACUITY_SCORE: 15
ADLS_ACUITY_SCORE: 25
ADLS_ACUITY_SCORE: 24
ADLS_ACUITY_SCORE: 15

## 2019-09-27 NOTE — PROGRESS NOTES
[facility]    Medicine Progress Note - Hospitalist Service        Date of Admission:  9/25/2019 12:13 AM    Assessment & Plan:   Jim Richard is a pleasant 64 year old male with history of alcoholic liver disease, ulcers, esophageal varices, CAD, hypertension, hyperlipidemia, peripheral vascular disease, tobacco use, subdural hematoma, and chronic low back pain who presented to the ED with generalized weakness. He also reports melena x 2 days.     Suspected Upper GI bleed  Anemia, acute on chronic  History of duodenal ulcer and esophageal varices  -Presented with reported melena  -Hgb 8.9 on admission, slightly lower than recent baseline of around 9.5-10  -Evaluated by GI on 9/25, upper GI endoscopy without evidence of bleeding  -Hemoglobin stable at 9.6 on 9/26  -Protonix 40 mg p.o. twice daily    Acute encephalopathy  -More encephalopathic overnight, received multiple doses of benzodiazepine for reported CIWA of up to 18  -Hold all benzodiazepines and sedatives for now  -Resume as clinically indicated, although I would have a higher threshold to administer benzodiazepine, more than likely he is far out from his last alcohol use  for any withdrawal symptoms to linger around     Acute Alcohol withdrawal  Alcoholic liver disease  Recurrent ascites  -last drink 6 days prior to presentation, patient had ongoing tremors which appears chronic.  -As mentioned above patient received multiple doses of benzodiazepine overnight, now encephalopathic  -Continue to monitor clinically.  Continue CIWA monitoring; however hold all benzodiazepines for now  -Status post therapeutic paracentesis this hospital stay with removal of 3500 mL of straw-colored fluid  -Chemical dependency counselor following  -Psychiatry consult today.     Hypokalemia and hypomagnesemia  -replete and monitor per protocol      Tobacco abuse  -pack a day smoker  -21 mg patch ordered per request  -encouraged cessation     Depression and  "Anxiety  -Continue PTA regimen once able to take orally    Pancytopenia  -likely secondary to chronic alcoholism (bone marrow suppression) and gi bleed    Generalized deconditioning:  -He appears weak overall, evaluated by physical therapy, they are recommending TCU.       Diet: 2 Gram Sodium Diet     DVT Prophylaxis: Pneumatic Compression Devices   Leger Catheter: not present  Code Status: DNR/DNI     Disposition Plan    Expected discharge: 2 - 3 days, recommended to TBD.   Entered: Luis Bateman MD 09/27/2019, 12:54 PM        The patient's care was discussed with the Bedside Nurse and Patient.    Luis Bateman MD  Hospitalist Service  [facility]    ______________________________________________________________________    Interval History   Patient is quite sedated and encephalopathic.  Received multiple doses of benzodiazepines overnight for reported CIWA score up to 18.  Benzodiazepines since 1 AM this morning.  Afebrile    Data reviewed today: I reviewed all medications, new labs and imaging results over the last 24 hours. I personally reviewed no images or EKG's today.    Physical Exam   Vital signs:  Temp: 98.3  F (36.8  C) Temp src: Oral BP: 130/57 Pulse: 85 Heart Rate: 73 Resp: 16 SpO2: 98 % O2 Device: None (Room air)     Weight: 81.1 kg (178 lb 12.8 oz)  Estimated body mass index is 28 kg/m  as calculated from the following:    Height as of 7/10/19: 1.702 m (5' 7\").    Weight as of this encounter: 81.1 kg (178 lb 12.8 oz).      Wt Readings from Last 2 Encounters:   09/27/19 81.1 kg (178 lb 12.8 oz)   08/13/19 84 kg (185 lb 3 oz)       Gen: Somnolent however arouses to verbal stimuli and answers 1 or 2 simple questions appropriately.  Difficult to accurately assess orientation  HEENT:  no pallor  Resp: CTA B/L, normal WOB  CVS: RRR, no murmur  Abd/GI: Soft, mild distention, no tenderness.  skin: Warm, dry no rashes  MSK: No joint deformities, trace pedal edema  Neuro- CN- intact. No " focal deficits.  Mild upper extremity tremors.      Data   Recent Labs   Lab 09/26/19  0305 09/25/19  0954 09/25/19  0320 09/25/19  0025   WBC 3.0*  --  3.1* 3.0*   HGB 9.6* 8.4* 8.7* 8.9*   MCV 96  --  96 97   PLT 68*  --  59* 67*   INR  --   --   --  1.35*     --   --  136   POTASSIUM 3.5 3.0*  --  3.0*   CHLORIDE 101  --   --  101   CO2 27  --   --  28   BUN 16  --   --  17   CR 1.02  --   --  0.89   ANIONGAP 6  --   --  7   KEV 8.0*  --   --  7.5*   *  --   --  92   ALBUMIN  --   --   --  2.5*   PROTTOTAL  --   --   --  6.1*   BILITOTAL  --   --   --  2.4*   ALKPHOS  --   --   --  268*   ALT  --   --   --  34   AST  --   --   --  147*       No results found for this or any previous visit (from the past 24 hour(s)).  Medications     - MEDICATION INSTRUCTIONS -         DULoxetine  60 mg Oral Daily     fluticasone-vilanterol  1 puff Inhalation Daily     folic acid  1 mg Oral Daily     furosemide  40 mg Oral Daily     mirtazapine  15 mg Oral At Bedtime     multivitamin w/minerals  1 tablet Oral Daily     nicotine  1 patch Transdermal Daily     nicotine   Transdermal Q8H     nicotine   Transdermal Daily     pantoprazole  40 mg Oral BID AC     QUEtiapine  50 mg Oral At Bedtime     sodium chloride (PF)  3 mL Intracatheter Q8H     spironolactone  50 mg Oral Daily     tamsulosin  0.4 mg Oral Daily     vitamin B1  100 mg Oral Daily

## 2019-09-27 NOTE — PLAN OF CARE
Patient is sleeping but arouses to voice. Is disoriented to time, place, and situation. Has not yet eaten dinner. VSS. Denies pain. LS clear. BS active. CMS intact. Scored 6 on CIWA due to base line tremor and confusion. Tele SD with BBB. Wound dressing CDI.

## 2019-09-27 NOTE — PLAN OF CARE
DATE & TIME: 0700-1530 9/27/19    Cognitive Concerns/ Orientation : Oriented to self only. Very lethargic this shift.    BEHAVIOR & AGGRESSION TOOL COLOR: Green  CIWA SCORE: 7/6   ABNL VS/O2: VSS, on RA.   MOBILITY: Up with 2 assist + GB. Pt very unsteady/shaky with ambulation.  PAIN MANAGMENT: Denies  DIET: Tolerating 2g Na diet.  BOWEL/BLADDER: Inc of bladder, no BM this shift.  ABNL LAB/BG: NA  DRAIN/DEVICES: NA  TELEMETRY RHYTHM: SR w/ BBB.  SKIN: Multiple bruises/skin tears. Steri strips intact. Polymem changed, CDI. Paracentesis site to LLQ CDI. Mepilex to coccyx removed. Blanchable redness.  TESTS/PROCEDURES: Psych and PT to see.   D/C DAY/GOALS/PLACE: Discharge date and time TBD.   OTHER IMPORTANT INFO: Hold all benzos for now, contact Dr. Bateman prior to administration. (per MD). PIV infiltrated, new one inserted. GI following. VPM/sitter/Seizure precautions in place. Nursing will continue to monitor.

## 2019-09-27 NOTE — PLAN OF CARE
DATE & TIME: 09/26/19 6541-0079                          Cognitive Concerns/ Orientation : alert to self only   BEHAVIOR & AGGRESSION TOOL COLOR: Green  CIWA SCORE: scoring 16, 11, 5 Ativan IV 2mg given 1x, 1mg given oral         ABNL VS/O2: VSS on RA  MOBILITY: Assist 1  PAIN MANAGMENT: Denies  DIET: low sodium  BOWEL/BLADDER: incontinent at times  ABNL LAB/BG: hemo-9.6, lactic- 1.3   DRAIN/DEVICES: PIV SL L arm  TELEMETRY RHYTHM: SR w/BBB  SKIN: redness on coccyx- preventative coccyx in place.   TESTS/PROCEDURES: NA             D/C DAY/GOALS/PLACE: pending progress,CD consult, SW following, PT/OT recommend TCU upon discharge.  OTHER IMPORTANT INFO: sitter @bedside. VPM ordered, but on wait list. Pt holdable. When arrived on unit trying to get out of bed but follows commands.

## 2019-09-27 NOTE — PLAN OF CARE
DATE & TIME: 9/27/19 2416-5484                       Cognitive Concerns/ Orientation : Alert to self only   BEHAVIOR & AGGRESSION TOOL COLOR: Green  CIWA SCORE: 11/12/18/11. Ativan given. Slept last few hours of shift.   ABNL VS/O2: BP elevated and tachy (during high score on CIWA), otherwise VSS on RA  MOBILITY: Assist x1  PAIN MANAGMENT: Denies pain   DIET: 2 gm sodium   BOWEL/BLADDER: Incontinent at times, will use urinal  ABNL LAB/BG: Hgb 9.6  DRAIN/DEVICES: PIV SL  TELEMETRY RHYTHM: SR w/BBB  SKIN: redness on coccyx- preventative coccyx in place.   TESTS/PROCEDURES: NA             D/C DAY/GOALS/PLACE: pending progress,CD consult, SW following, PT/OT recommend TCU upon discharge.  OTHER IMPORTANT INFO: sitter @bedside. VPM ordered, but on wait list. Pt holdable. Frequently tries to get out of bed.

## 2019-09-27 NOTE — PLAN OF CARE
Discharge Planner PT   Patient plan for discharge: TCU  Current status: Pt has decreased motor planning and ataxia with gait. Pt required modA of 2 using walker to ambulate to and from bathroom. Pt needed mutliple cues, one step commands repeatedly to keep walking . Pt with shuffling gait and decreased weight shift and decreased stride to ambulate without significant assist. Pt 's gait is significantly below ability during eval yesterday. Pt was very unsteady for gait for 10-15 ft. Pt requires modA to sit EOB. ModA of 2 to stand from bed and toilet.  Barriers to return to prior living situation: Pt needs to be independent  since at home alone even with roommate to assist with some chores. Pt has stairs.  Recommendations for discharge: TCU  Rationale for recommendations: Pt is below baseline and has declined in mobility and level of alertness since eval. Pt requires assist of 2 and needs skilled care to increase functional mobility.       Entered by: Andreina Thrasher 09/27/2019 4:10 PM

## 2019-09-27 NOTE — CONSULTS
Patient not able to participate in interview due to somnolence. Will attempt to see again tomorrow.

## 2019-09-28 ENCOUNTER — APPOINTMENT (OUTPATIENT)
Dept: PHYSICAL THERAPY | Facility: CLINIC | Age: 64
DRG: 896 | End: 2019-09-28
Payer: MEDICARE

## 2019-09-28 LAB
ANION GAP SERPL CALCULATED.3IONS-SCNC: 6 MMOL/L (ref 3–14)
BUN SERPL-MCNC: 17 MG/DL (ref 7–30)
CALCIUM SERPL-MCNC: 8.5 MG/DL (ref 8.5–10.1)
CHLORIDE SERPL-SCNC: 101 MMOL/L (ref 94–109)
CO2 SERPL-SCNC: 28 MMOL/L (ref 20–32)
CREAT SERPL-MCNC: 1.07 MG/DL (ref 0.66–1.25)
ERYTHROCYTE [DISTWIDTH] IN BLOOD BY AUTOMATED COUNT: 15.6 % (ref 10–15)
GFR SERPL CREATININE-BSD FRML MDRD: 72 ML/MIN/{1.73_M2}
GLUCOSE SERPL-MCNC: 110 MG/DL (ref 70–99)
HCT VFR BLD AUTO: 32.1 % (ref 40–53)
HGB BLD-MCNC: 10.8 G/DL (ref 13.3–17.7)
MCH RBC QN AUTO: 32.3 PG (ref 26.5–33)
MCHC RBC AUTO-ENTMCNC: 33.6 G/DL (ref 31.5–36.5)
MCV RBC AUTO: 96 FL (ref 78–100)
PLATELET # BLD AUTO: 112 10E9/L (ref 150–450)
POTASSIUM SERPL-SCNC: 3.2 MMOL/L (ref 3.4–5.3)
POTASSIUM SERPL-SCNC: 3.4 MMOL/L (ref 3.4–5.3)
RBC # BLD AUTO: 3.34 10E12/L (ref 4.4–5.9)
SODIUM SERPL-SCNC: 135 MMOL/L (ref 133–144)
WBC # BLD AUTO: 3.6 10E9/L (ref 4–11)

## 2019-09-28 PROCEDURE — 85027 COMPLETE CBC AUTOMATED: CPT | Performed by: INTERNAL MEDICINE

## 2019-09-28 PROCEDURE — 36415 COLL VENOUS BLD VENIPUNCTURE: CPT | Performed by: INTERNAL MEDICINE

## 2019-09-28 PROCEDURE — 99221 1ST HOSP IP/OBS SF/LOW 40: CPT | Performed by: PSYCHIATRY & NEUROLOGY

## 2019-09-28 PROCEDURE — 84132 ASSAY OF SERUM POTASSIUM: CPT | Performed by: INTERNAL MEDICINE

## 2019-09-28 PROCEDURE — 97116 GAIT TRAINING THERAPY: CPT | Mod: GP

## 2019-09-28 PROCEDURE — 25000128 H RX IP 250 OP 636: Performed by: INTERNAL MEDICINE

## 2019-09-28 PROCEDURE — 25000132 ZZH RX MED GY IP 250 OP 250 PS 637: Mod: GY | Performed by: INTERNAL MEDICINE

## 2019-09-28 PROCEDURE — 25000132 ZZH RX MED GY IP 250 OP 250 PS 637: Mod: GY | Performed by: HOSPITALIST

## 2019-09-28 PROCEDURE — 80048 BASIC METABOLIC PNL TOTAL CA: CPT | Performed by: INTERNAL MEDICINE

## 2019-09-28 PROCEDURE — 99233 SBSQ HOSP IP/OBS HIGH 50: CPT | Performed by: INTERNAL MEDICINE

## 2019-09-28 PROCEDURE — 99207 ZZC MOONLIGHTING INDICATOR: CPT | Performed by: INTERNAL MEDICINE

## 2019-09-28 PROCEDURE — 12000000 ZZH R&B MED SURG/OB

## 2019-09-28 PROCEDURE — 97110 THERAPEUTIC EXERCISES: CPT | Mod: GP

## 2019-09-28 PROCEDURE — 90662 IIV NO PRSV INCREASED AG IM: CPT | Performed by: INTERNAL MEDICINE

## 2019-09-28 RX ADMIN — NICOTINE 1 PATCH: 21 PATCH, EXTENDED RELEASE TRANSDERMAL at 09:14

## 2019-09-28 RX ADMIN — QUETIAPINE 50 MG: 25 TABLET ORAL at 21:22

## 2019-09-28 RX ADMIN — MIRTAZAPINE 15 MG: 15 TABLET, FILM COATED ORAL at 21:22

## 2019-09-28 RX ADMIN — LACTULOSE 20 G: 20 SOLUTION ORAL at 21:22

## 2019-09-28 RX ADMIN — Medication 100 MG: at 09:13

## 2019-09-28 RX ADMIN — FOLIC ACID 1 MG: 1 TABLET ORAL at 09:13

## 2019-09-28 RX ADMIN — TAMSULOSIN HYDROCHLORIDE 0.4 MG: 0.4 CAPSULE ORAL at 09:13

## 2019-09-28 RX ADMIN — SPIRONOLACTONE 50 MG: 25 TABLET ORAL at 09:13

## 2019-09-28 RX ADMIN — DULOXETINE HYDROCHLORIDE 60 MG: 60 CAPSULE, DELAYED RELEASE ORAL at 09:13

## 2019-09-28 RX ADMIN — FUROSEMIDE 40 MG: 40 TABLET ORAL at 09:13

## 2019-09-28 RX ADMIN — FLUTICASONE FUROATE AND VILANTEROL TRIFENATATE 1 PUFF: 200; 25 POWDER RESPIRATORY (INHALATION) at 09:13

## 2019-09-28 RX ADMIN — POTASSIUM CHLORIDE 20 MEQ: 1500 TABLET, EXTENDED RELEASE ORAL at 13:09

## 2019-09-28 RX ADMIN — MULTIPLE VITAMINS W/ MINERALS TAB 1 TABLET: TAB at 09:13

## 2019-09-28 RX ADMIN — POTASSIUM CHLORIDE 40 MEQ: 1500 TABLET, EXTENDED RELEASE ORAL at 10:31

## 2019-09-28 RX ADMIN — LACTULOSE 20 G: 20 SOLUTION ORAL at 09:13

## 2019-09-28 RX ADMIN — LACTULOSE 20 G: 20 SOLUTION ORAL at 16:19

## 2019-09-28 RX ADMIN — PANTOPRAZOLE SODIUM 40 MG: 40 TABLET, DELAYED RELEASE ORAL at 06:42

## 2019-09-28 RX ADMIN — PANTOPRAZOLE SODIUM 40 MG: 40 TABLET, DELAYED RELEASE ORAL at 16:19

## 2019-09-28 RX ADMIN — INFLUENZA A VIRUS A/MICHIGAN/45/2015 X-275 (H1N1) ANTIGEN (FORMALDEHYDE INACTIVATED), INFLUENZA A VIRUS A/SINGAPORE/INFIMH-16-0019/2016 IVR-186 (H3N2) ANTIGEN (FORMALDEHYDE INACTIVATED), AND INFLUENZA B VIRUS B/MARYLAND/15/2016 BX-69A (A B/COLORADO/6/2017-LIKE VIRUS) ANTIGEN (FORMALDEHYDE INACTIVATED) 0.5 ML: 60; 60; 60 INJECTION, SUSPENSION INTRAMUSCULAR at 09:13

## 2019-09-28 ASSESSMENT — ACTIVITIES OF DAILY LIVING (ADL)
ADLS_ACUITY_SCORE: 24
ADLS_ACUITY_SCORE: 15
ADLS_ACUITY_SCORE: 24
ADLS_ACUITY_SCORE: 15
ADLS_ACUITY_SCORE: 24
ADLS_ACUITY_SCORE: 15

## 2019-09-28 NOTE — PLAN OF CARE
DATE & TIME: 9/28/19 9002-8232    Cognitive Concerns/ Orientation : A&O x4   BEHAVIOR & AGGRESSION TOOL COLOR: Green  CIWA SCORE: 2, 2, 2   ABNL VS/O2: VSS on RA  MOBILITY: A1 with gait belt and walker.  PAIN MANAGMENT: c/o mild pain to abdomen.   DIET: 2g Na  BOWEL/BLADDER: Occasional incontinence  ABNL LAB/BG: K 3.2, WBC 3.6.  DRAIN/DEVICES: PIV SL  TELEMETRY RHYTHM: SD with BBB  SKIN: Skin tears covered with dressings. Changed this shift. Remain CDI.  TESTS/PROCEDURES: 9/25 upper endo, negative.  D/C DAY/GOALS/PLACE: TCU in 2-3d.  OTHER IMPORTANT INFO:   A&O x4. VSS on RA. C/o mild pain to abdomen. Up A1 with gait belt and walker. 2g Na diet. Tolerating well. PIV SL. K 3.2. Replaced. Recheck 1700 3.4. CIWA 2, 2, 2. LS clear. BS active, + Flatus. Abdomen distended and tender to palpation. Voiding adequately in BR. Psych consulted. VPM at bedside. Progressing toward plan of care.

## 2019-09-28 NOTE — CONSULTS
"Consult Date:  09/28/2019      CARDIOLOGY CONSULTATION      REASON FOR CONSULTATION:  Alcohol use disorder.      REQUESTING PHYSICIAN:  Jude Ledesma MD      CHIEF COMPLAINT:  \"I know I need help.\"      HISTORY OF PRESENT ILLNESS:  Jim Richard is a 64-year-old male with a history of alcohol use disorder and a medical history to include esophageal varices, coronary artery disease, hypertension, hyperlipidemia, peripheral vascular disease, tobacco use disorder, ulcers, chronic low back pain that presented to the Emergency Department with complaints of weakness, 2 days of melena, and concerns for alcohol withdrawal.  He notes that he has abstained from alcohol for 6 days prior to presentation.  The patient is currently being managed on [facility] protocol.  Gastroenterology has been involved as well and removed about 3.5 liters via therapeutic paracentesis.  He is being administered lactulose for hepatic encephalopathy.      I saw the patient via a psych consult for similar indications on 08/03.  At that time, the patient indicated that he was not interested in any treatment referrals as he had been to treatment many times in the past and thought that it was simply a matter of employing the things he already knew.  His tune has changed today; however, and he tells me \"I know I need help, I was dry for 3 weeks and 1 night.  I decided to drink and I got sick.\"  He states that he has someone in the community investigating treatment options for him, but tells me he is open to speaking with a chemical dependency counselor for treatment referrals as he knows that he can sustain with his current level of drinking and the health consequences that have ensued.  He restricts drinking as his primary problem, is denying any overlaid psychiatric complaint.  No SI/HI, or other safety concerns.      PAST PSYCHIATRIC HISTORY:  As per HPI.      PAST CHEMICAL DEPENDENCY HISTORY:  As per HPI.      PAST MEDICAL HISTORY:  As per HPI.    "   FAMILY HISTORY:  Father with substance abuse issues.  Brother with substance abuse issues.      SOCIAL HISTORY:  Reviewed in EMR.      ALLERGIES:  AMLODIPINE, LISINOPRIL.      PRIOR TO ADMISSION MEDICATIONS:   1.  Albuterol.   2.  Duloxetine 60 mg daily.   3.  Fluticasone vilanterol inhaler.   4.  Folic acid.   5.  Furosemide.   6.  Mirtazapine 50 mg each day at bedtime.   7.  Multivitamin.   8.  Nicotine patch.   9.  Pantoprazole.   10.  Propranolol.   11.  Seroquel 50 mg each day at bedtime.   12.  Spironolactone.   13.  Tamsulosin.   14.  Vitamin B1.      MENTAL STATUS EXAMINATION:  Elderly appearing male with fair grooming and hygiene.  Calm and cooperative with good eye contact.  Awake, alert and fully oriented.  Cooperative, forthcoming, and a seemingly reliable historian.  Mood is described as good.  Affect was mood congruent, stable, and calm.  Speech was fluent, spontaneous clear, nonpressured.  Thoughts are linear, logical and goal directed.  No observation of delusional content.  No observation of response to internal stimuli.  No fluctuation in cognition appreciated.  Memory grossly intact.  Attendance and concentration were well maintained.  Insight and judgment are fair.  Denies suicidal or homicidal ideation, intent or plan.      DIAGNOSES:   1.  Alcohol use disorder, severe, leading to alcohol withdrawal.   2.  Multiple medical sequelae secondary to number 1.      IMPRESSION:  Jim Richard is a 64-year-old male with a psychiatric history of alcohol use disorder and multiple medical difficulties secondary to this to include alcoholic cirrhosis and esophageal varices.  He was last seen by our service 1 month ago for alcohol withdrawal and at that time was refusing chemical dependency consult and stated that he simply needed to return back to  and that he was not going to learn anything new via treatment, he just had to work on the steps.  At present, however, he is telling me that he is  recognizing AA has not been sufficient.  He notes that he returned to AA and maintained about 3 weeks sobriety, though falling back into old patterns, started drinking again and immediately felt sick.  Would recommend chemical dependency touch base with the patient for chemical dependency treatment recommendations to which he is now open to.      PLAN:    1.  CD consult for treatment recommendations.   2.  Would not recommend any psychotropic changes at this time.   3.  The patient does not require psychiatric hospitalization.   4.  Reconsult Psychiatry as needed.         RICHARD CHRISTIANSON DO             D: 2019   T: 2019   MT: NAMRATA      Name:     DEVONTE SEGURA   MRN:      [id number]        Account:       [id number]   :      1955           Consult Date:  2019      Document: [id number]

## 2019-09-28 NOTE — PROGRESS NOTES
[facility]    Medicine Progress Note - Hospitalist Service        Date of Admission:  9/25/2019 12:13 AM    Assessment & Plan:   Jim Richard is a pleasant 64 year old male with history of alcoholic liver disease, ulcers, esophageal varices, CAD, hypertension, hyperlipidemia, peripheral vascular disease, tobacco use, subdural hematoma, and chronic low back pain who presented to the ED with generalized weakness. He also reports melena x 2 days.     Suspected Upper GI bleed  Anemia, acute on chronic  History of duodenal ulcer and esophageal varices  -Presented with reported melena  -Hgb 8.9 on admission, slightly lower than recent baseline of around 9.5-10  -Evaluated by GI on 9/25, upper GI endoscopy without evidence of bleeding  -Hemoglobin stable at 10.8 today.   -Protonix 40 mg p.o. twice daily    Acute encephalopathy  -More encephalopathic overnight, received multiple doses of benzodiazepine for reported CIWA of up to 18  -Hold all benzodiazepines and sedatives for now  -Resume as clinically indicated     Acute Alcohol withdrawal  Alcoholic liver disease  Recurrent ascites  -last drink 6 days prior to presentation, patient had ongoing tremors which appears chronic.  -As mentioned above patient received multiple doses of benzodiazepine overnight, now encephalopathic  -Continue to monitor clinically.  Continue CIWA monitoring; however hold all benzodiazepines for now  -Status post therapeutic paracentesis this hospital stay with removal of 3500 mL of straw-colored fluid  -Chemical dependency counselor following  -Psychiatry consult today.     Hypokalemia and hypomagnesemia  -replete and monitor per protocol      Tobacco abuse  -pack a day smoker  -21 mg patch ordered per request  -encouraged cessation     Depression and Anxiety  -Continue PTA regimen once able to take orally    Pancytopenia  -likely secondary to chronic alcoholism (bone marrow suppression) and gi bleed    Generalized  "deconditioning:  -He appears weak overall, evaluated by physical therapy, they are recommending TCU.       Diet: 2 Gram Sodium Diet     DVT Prophylaxis: Pneumatic Compression Devices   Leger Catheter: not present  Code Status: DNR/DNI     Disposition Plan    Expected discharge: 2 - 3 days, social work will find TCU   Entered: Gali Snyder MD 09/28/2019, 11:28 AM        The patient's care was discussed with the Bedside Nurse and Patient.    Gali Snyder MD  Hospitalist Service  [facility]    ______________________________________________________________________    Interval History   Did not require benzo overnight. Seen by psych, their consult note pending.     Data reviewed today: I reviewed all medications, new labs and imaging results over the last 24 hours. I personally reviewed no images or EKG's today.    Physical Exam   Vital signs:  Temp: 98.4  F (36.9  C) Temp src: Oral BP: 122/76   Heart Rate: 85 Resp: 18 SpO2: 94 % O2 Device: None (Room air)     Weight: 81.1 kg (178 lb 12.8 oz)  Estimated body mass index is 28 kg/m  as calculated from the following:    Height as of 7/10/19: 1.702 m (5' 7\").    Weight as of this encounter: 81.1 kg (178 lb 12.8 oz).      Wt Readings from Last 2 Encounters:   09/27/19 81.1 kg (178 lb 12.8 oz)   08/13/19 84 kg (185 lb 3 oz)       Gen: Somnolent however arouses to verbal stimuli and answers 1 or 2 simple questions appropriately.  Difficult to accurately assess orientation  HEENT:  no pallor  Resp: CTA B/L, normal WOB  CVS: RRR, no murmur  Abd/GI: Soft, mild distention, no tenderness.  skin: Warm, dry no rashes  MSK: No joint deformities, trace pedal edema  Neuro- CN- intact. No focal deficits.  Mild upper extremity tremors.      Data   Recent Labs   Lab 09/28/19  0941 09/26/19  0305 09/25/19  0954 09/25/19  0320 09/25/19  0025   WBC 3.6* 3.0*  --  3.1* 3.0*   HGB 10.8* 9.6* 8.4* 8.7* 8.9*   MCV 96 96  --  96 97   * 68*  --  59* 67*   INR  --   --   --   -- "  1.35*    134  --   --  136   POTASSIUM 3.2* 3.5 3.0*  --  3.0*   CHLORIDE 101 101  --   --  101   CO2 28 27  --   --  28   BUN 17 16  --   --  17   CR 1.07 1.02  --   --  0.89   ANIONGAP 6 6  --   --  7   KEV 8.5 8.0*  --   --  7.5*   * 108*  --   --  92   ALBUMIN  --   --   --   --  2.5*   PROTTOTAL  --   --   --   --  6.1*   BILITOTAL  --   --   --   --  2.4*   ALKPHOS  --   --   --   --  268*   ALT  --   --   --   --  34   AST  --   --   --   --  147*       No results found for this or any previous visit (from the past 24 hour(s)).  Medications     - MEDICATION INSTRUCTIONS -         DULoxetine  60 mg Oral Daily     fluticasone-vilanterol  1 puff Inhalation Daily     folic acid  1 mg Oral Daily     furosemide  40 mg Oral Daily     lactulose  20 g Oral TID     mirtazapine  15 mg Oral At Bedtime     multivitamin w/minerals  1 tablet Oral Daily     nicotine  1 patch Transdermal Daily     nicotine   Transdermal Q8H     nicotine   Transdermal Daily     pantoprazole  40 mg Oral BID AC     QUEtiapine  50 mg Oral At Bedtime     sodium chloride (PF)  3 mL Intracatheter Q8H     spironolactone  50 mg Oral Daily     tamsulosin  0.4 mg Oral Daily     vitamin B1  100 mg Oral Daily

## 2019-09-28 NOTE — PLAN OF CARE
PT:  Discharge Planner PT   Patient plan for discharge: TCU  Current status: Pt required SBA for supine to/from sit, SBA sit to/from stand with FWW and CGA for gait of 250 ft with FWW with slow pace, wide [place] and shuffled gait with small step length. Pt feels he is getting stronger. Participated well in seated strengthening at the EOB. Complained of back pain but declined a heat pack.  Barriers to return to prior living situation: Needs assist with all mobility, high falls risk, new AD use, stairs in the home  Recommendations for discharge: TCU  Rationale for recommendations: Pt would benefit from continued PT to improve strength, balance, mobility to increase independence, reduce falls risk and increase safety before returning home. Pt is far below his baseline mobility status.       Entered by: Alena Patton 09/28/2019 11:03 AM

## 2019-09-28 NOTE — PROGRESS NOTES
DATE & TIME: 9/27/19 1900-0730     Cognitive Concerns/ Orientation : A/Ox3; forgetful    BEHAVIOR & AGGRESSION TOOL COLOR: green   CIWA SCORE: 3. (Call MD before giving benzos).    ABNL VS/O2: VSS. RA  MOBILITY: Ax1; walker   PAIN MANAGMENT: discomfort on the right side of abdomen   DIET: 2g Na  BOWEL/BLADDER: up to bathroom; no reports of melena; incontinent at times.   ABNL LAB/BG: K 3.5. Mg 1.8. Hgb 9.6.   DRAIN/DEVICES: saline locked   TELEMETRY RHYTHM: SR with BBB  SKIN: skin tears on BUE; dressing CDI  TESTS/PROCEDURES: 9/25 upper [facility]  D/C DAY/GOALS/PLACE: discharge 2-3 days to TCU  OTHER IMPORTANT INFO: pt ate dinner around 8pm; somnolent at start of shift, was wide awake for bathroom break and late dinner.   CD: states pt not appropriate  Psych: attempt to see pt tomorrow

## 2019-09-28 NOTE — PROGRESS NOTES
[facility]  Gastroenterology Progress Note     Jim Richard MRN# [id number]   YOB: 1955 Age: 64 year old          Assessment and Plan:     Kellie Fernandez is pretty sleepy patient is responding to verbal command no new GI complaints.  Continue on supportive care.            Alcohol withdrawal, uncomplicated (H)  Anemia, unspecified type  Melena  Alcoholic cirrhosis of liver with ascites (H)      Interval History:   doing well; no cp, sob, n/v/d, or abd pain.              Review of Systems:   C: NEGATIVE for fever, chills, change in weight  E/M: NEGATIVE for ear, mouth and throat problems  R: NEGATIVE for significant cough or SOB  CV: NEGATIVE for chest pain, palpitations or peripheral edema             Medications:   I have reviewed this patient's current medications    DULoxetine  60 mg Oral Daily     fluticasone-vilanterol  1 puff Inhalation Daily     folic acid  1 mg Oral Daily     furosemide  40 mg Oral Daily     lactulose  20 g Oral TID     mirtazapine  15 mg Oral At Bedtime     multivitamin w/minerals  1 tablet Oral Daily     nicotine  1 patch Transdermal Daily     nicotine   Transdermal Q8H     nicotine   Transdermal Daily     pantoprazole  40 mg Oral BID AC     QUEtiapine  50 mg Oral At Bedtime     sodium chloride (PF)  3 mL Intracatheter Q8H     spironolactone  50 mg Oral Daily     tamsulosin  0.4 mg Oral Daily     vitamin B1  100 mg Oral Daily                  Physical Exam:   Vitals were reviewed  Vital Signs with Ranges  Temp:  [97.6  F (36.4  C)-99  F (37.2  C)] 98.4  F (36.9  C)  Heart Rate:  [76-85] 85  Resp:  [16-18] 18  BP: (108-144)/(67-76) 122/76  SpO2:  [92 %-94 %] 94 %  I/O last 3 completed shifts:  In: 100 [P.O.:100]  Out: -   Constitutional: healthy, alert and no distress   Cardiovascular: negative, PMI normal. No lifts, heaves, or thrills. RRR. No murmurs, clicks gallops or rub  Respiratory: negative, Percussion normal. Good diaphragmatic excursion. Lungs  clear  Head: Normocephalic. No masses, lesions, tenderness or abnormalities  Neck: Neck supple. No adenopathy. Thyroid symmetric, normal size,, Carotids without bruits.  Abdomen: Abdomen soft, non-tender. BS normal. No masses, organomegaly  SKIN: no suspicious lesions or rashes           Data:   I reviewed the patient's new clinical lab test results.   Recent Labs   Lab Test 09/28/19  0941 09/26/19  0305 09/25/19  0954 09/25/19  0320 09/25/19  0025  08/06/19  0739  08/01/19  1629   WBC 3.6* 3.0*  --  3.1* 3.0*   < > 5.2   < > 3.8*   HGB 10.8* 9.6* 8.4* 8.7* 8.9*   < > 10.8*   < > 8.7*   MCV 96 96  --  96 97   < > 91   < > 95   * 68*  --  59* 67*   < > 191   < > 155   INR  --   --   --   --  1.35*  --  1.31*  --  1.18*    < > = values in this interval not displayed.     Recent Labs   Lab Test 09/28/19  0941 09/26/19  0305 09/25/19  0954 09/25/19  0025   POTASSIUM 3.2* 3.5 3.0* 3.0*   CHLORIDE 101 101  --  101   CO2 28 27  --  28   BUN 17 16  --  17   ANIONGAP 6 6  --  7     Recent Labs   Lab Test 09/25/19  0025 08/12/19  0823 08/11/19  0936  02/13/19  2340  09/29/18  0550  03/21/18  1555  09/25/14  0510  06/12/12  0550   ALBUMIN 2.5* 3.1* 2.7*   < > 3.1*   < > 3.2*   < > 2.9*   < >  --    < >  --    BILITOTAL 2.4* 2.2* 2.4*   < > 0.4   < > 1.0   < > 0.9   < >  --    < >  --    ALT 34 24 24   < > 41   < > 60   < > 74*   < >  --    < >  --    * 37 36   < > 89*   < > 140*   < > 136*   < >  --    < >  --    PROTEIN  --   --   --   --   --   --   --   --   --   --  Negative  --  Negative   LIPASE  --   --   --   --  536*  --  585*  --  316  --   --   --   --     < > = values in this interval not displayed.       I reviewed the patient's new imaging results.    All laboratory data reviewed  All imaging studies reviewed by me.    Rosendo Shaw MD,  9/28/2019  [facility]  Office : [phone]  Cell: [phone]

## 2019-09-29 ENCOUNTER — APPOINTMENT (OUTPATIENT)
Dept: PHYSICAL THERAPY | Facility: CLINIC | Age: 64
DRG: 896 | End: 2019-09-29
Payer: MEDICARE

## 2019-09-29 LAB
ALBUMIN SERPL-MCNC: 2.2 G/DL (ref 3.4–5)
ALP SERPL-CCNC: 281 U/L (ref 40–150)
ALT SERPL W P-5'-P-CCNC: 35 U/L (ref 0–70)
ANION GAP SERPL CALCULATED.3IONS-SCNC: 7 MMOL/L (ref 3–14)
AST SERPL W P-5'-P-CCNC: 68 U/L (ref 0–45)
BILIRUB SERPL-MCNC: 2 MG/DL (ref 0.2–1.3)
BUN SERPL-MCNC: 23 MG/DL (ref 7–30)
CALCIUM SERPL-MCNC: 8.4 MG/DL (ref 8.5–10.1)
CHLORIDE SERPL-SCNC: 105 MMOL/L (ref 94–109)
CO2 SERPL-SCNC: 26 MMOL/L (ref 20–32)
CREAT SERPL-MCNC: 1.1 MG/DL (ref 0.66–1.25)
ERYTHROCYTE [DISTWIDTH] IN BLOOD BY AUTOMATED COUNT: 15.4 % (ref 10–15)
GFR SERPL CREATININE-BSD FRML MDRD: 70 ML/MIN/{1.73_M2}
GLUCOSE SERPL-MCNC: 125 MG/DL (ref 70–99)
HCT VFR BLD AUTO: 30.3 % (ref 40–53)
HGB BLD-MCNC: 10.1 G/DL (ref 13.3–17.7)
MCH RBC QN AUTO: 32.5 PG (ref 26.5–33)
MCHC RBC AUTO-ENTMCNC: 33.3 G/DL (ref 31.5–36.5)
MCV RBC AUTO: 97 FL (ref 78–100)
PLATELET # BLD AUTO: 96 10E9/L (ref 150–450)
POTASSIUM SERPL-SCNC: 3.5 MMOL/L (ref 3.4–5.3)
PROT SERPL-MCNC: 6.1 G/DL (ref 6.8–8.8)
RBC # BLD AUTO: 3.11 10E12/L (ref 4.4–5.9)
SODIUM SERPL-SCNC: 138 MMOL/L (ref 133–144)
WBC # BLD AUTO: 3.7 10E9/L (ref 4–11)

## 2019-09-29 PROCEDURE — 97110 THERAPEUTIC EXERCISES: CPT | Mod: GP

## 2019-09-29 PROCEDURE — 40000007 ZZH STATISTIC ADULT CD FACE TO FACE-NO CHRG

## 2019-09-29 PROCEDURE — 25000132 ZZH RX MED GY IP 250 OP 250 PS 637: Mod: GY | Performed by: INTERNAL MEDICINE

## 2019-09-29 PROCEDURE — 25000132 ZZH RX MED GY IP 250 OP 250 PS 637: Mod: GY | Performed by: HOSPITALIST

## 2019-09-29 PROCEDURE — 99207 ZZC MOONLIGHTING INDICATOR: CPT | Performed by: INTERNAL MEDICINE

## 2019-09-29 PROCEDURE — 80053 COMPREHEN METABOLIC PANEL: CPT | Performed by: INTERNAL MEDICINE

## 2019-09-29 PROCEDURE — 36415 COLL VENOUS BLD VENIPUNCTURE: CPT | Performed by: INTERNAL MEDICINE

## 2019-09-29 PROCEDURE — 85027 COMPLETE CBC AUTOMATED: CPT | Performed by: INTERNAL MEDICINE

## 2019-09-29 PROCEDURE — 12000000 ZZH R&B MED SURG/OB

## 2019-09-29 PROCEDURE — 97116 GAIT TRAINING THERAPY: CPT | Mod: GP

## 2019-09-29 PROCEDURE — 99233 SBSQ HOSP IP/OBS HIGH 50: CPT | Performed by: INTERNAL MEDICINE

## 2019-09-29 RX ADMIN — Medication 100 MG: at 08:13

## 2019-09-29 RX ADMIN — LACTULOSE 20 G: 20 SOLUTION ORAL at 17:04

## 2019-09-29 RX ADMIN — LACTULOSE 20 G: 20 SOLUTION ORAL at 21:54

## 2019-09-29 RX ADMIN — MIRTAZAPINE 15 MG: 15 TABLET, FILM COATED ORAL at 21:54

## 2019-09-29 RX ADMIN — FLUTICASONE FUROATE AND VILANTEROL TRIFENATATE 1 PUFF: 200; 25 POWDER RESPIRATORY (INHALATION) at 08:12

## 2019-09-29 RX ADMIN — FOLIC ACID 1 MG: 1 TABLET ORAL at 08:13

## 2019-09-29 RX ADMIN — NICOTINE 1 PATCH: 21 PATCH, EXTENDED RELEASE TRANSDERMAL at 08:12

## 2019-09-29 RX ADMIN — LACTULOSE 20 G: 20 SOLUTION ORAL at 08:12

## 2019-09-29 RX ADMIN — TAMSULOSIN HYDROCHLORIDE 0.4 MG: 0.4 CAPSULE ORAL at 08:13

## 2019-09-29 RX ADMIN — DULOXETINE HYDROCHLORIDE 60 MG: 60 CAPSULE, DELAYED RELEASE ORAL at 08:13

## 2019-09-29 RX ADMIN — SPIRONOLACTONE 50 MG: 25 TABLET ORAL at 08:12

## 2019-09-29 RX ADMIN — FUROSEMIDE 40 MG: 40 TABLET ORAL at 08:13

## 2019-09-29 RX ADMIN — PANTOPRAZOLE SODIUM 40 MG: 40 TABLET, DELAYED RELEASE ORAL at 17:04

## 2019-09-29 RX ADMIN — MULTIPLE VITAMINS W/ MINERALS TAB 1 TABLET: TAB at 08:12

## 2019-09-29 RX ADMIN — QUETIAPINE 50 MG: 25 TABLET ORAL at 21:54

## 2019-09-29 RX ADMIN — PANTOPRAZOLE SODIUM 40 MG: 40 TABLET, DELAYED RELEASE ORAL at 06:45

## 2019-09-29 ASSESSMENT — ACTIVITIES OF DAILY LIVING (ADL)
ADLS_ACUITY_SCORE: 23
ADLS_ACUITY_SCORE: 23
ADLS_ACUITY_SCORE: 24
ADLS_ACUITY_SCORE: 23
ADLS_ACUITY_SCORE: 24
ADLS_ACUITY_SCORE: 23

## 2019-09-29 NOTE — PROGRESS NOTES
SW:  Discharge Planner   Discharge Plans in progress: Patient accepted at [facility] pending an appt with Chiquita to discuss no alcohol policy.  Barriers to discharge plan: Pending appt with facility and patient agreement to return to [facility] treatment post TCU.  Follow up plan: Patient not yet medically ready. Anticipate discharge Tues Oct 1 per MD note.    SW to continue to follow and assist with discharge planning.         Entered by: Alis Cueva 09/29/2019 4:07 PM

## 2019-09-29 NOTE — PLAN OF CARE
DATE & TIME: 9/28/19 0630   Cognitive Concerns/ Orientation : AXO x4   BEHAVIOR & AGGRESSION TOOL COLOR: amarjit   CIWA SCORE: 4   ABNL VS/O2: VSS ex tachy at times  MOBILITY: A1 with walker and GB  PAIN MANAGMENT: denies pain  DIET: 2 gram Na  BOWEL/BLADDER: Continent, uses bathroom  ABNL LAB/BG: NA  DRAIN/DEVICES: NA  TELEMETRY RHYTHM: SR w/BBB  SKIN: some skin tears covered with foam  TESTS/PROCEDURES: NA  D/C DAY/GOALS/PLACE: discharge pending  OTHER IMPORTANT INFO: NA     Pt A&O x4. VSS on RA. Tele NSR w/BBB. CIWA 4 and 1. Up with A1 and walker. 2 gram Na diet. Was incontinent once overnight - large, loose stool.  Pt denies pain. IV SL. Plan to discharge to TCU in 2-3 days when medically stable.

## 2019-09-29 NOTE — CONSULTS
The writer met with the patient introduced herself and her role. The writer provided pt with information for [facility]'s treatment program and discussed seeking treatment services through that program after his TCU stay. Pt reported he does not want to go back to [facility]'s treatment. The writer recommended to the patient he work with his insurance and TCU to look into other alternatives for [facility] support as Medicare only covers [facility]'s inpatient treatment program and no outpatient treatment programs at this time. The writer discussed consult with  as patient may be an appropriate candidate to assess for other services prior to discharge or during TCU stay such as an [facility] worker or supported living services through the [facility].

## 2019-09-29 NOTE — PROGRESS NOTES
made brief follow-up visit to pt who was less shaky and less confused than in prior visit.  provided listening. SH remains available upon request

## 2019-09-29 NOTE — PROGRESS NOTES
[facility]    Medicine Progress Note - Hospitalist Service        Date of Admission:  9/25/2019 12:13 AM    Assessment & Plan:   Jim Richard is a pleasant 64 year old male with history of alcoholic liver disease, ulcers, esophageal varices, CAD, hypertension, hyperlipidemia, peripheral vascular disease, tobacco use, subdural hematoma, and chronic low back pain who presented to the ED with generalized weakness. He also reports melena x 2 days.     Suspected Upper GI bleed  Anemia, acute on chronic  History of duodenal ulcer and esophageal varices  -Presented with reported melena  -Hgb 8.9 on admission, slightly lower than recent baseline of around 9.5-10. Now 9.6.   -Evaluated by GI on 9/25, upper GI endoscopy without evidence of bleeding   -Protonix 40 mg p.o. twice daily    Acute encephalopathy  -More encephalopathic overnight, received multiple doses of benzodiazepine for reported CIWA of up to 18  -Hold all benzodiazepines and sedatives for now  -Resume as clinically indicated     Acute Alcohol withdrawal  Alcoholic liver disease  Recurrent ascites  -last drink 6 days prior to presentation, patient had ongoing tremors which appears chronic.  -As mentioned above patient received multiple doses of benzodiazepine overnight, now encephalopathic  -Continue to monitor clinically.  Continue CIWA monitoring; however hold all benzodiazepines for now  -Status post therapeutic paracentesis this hospital stay with removal of 3500 mL of straw-colored fluid  -Chemical dependency counselor following  -Psychiatry consult done.      Hypokalemia and hypomagnesemia  -replete and monitor per protocol      Tobacco abuse  -pack a day smoker  -21 mg patch ordered per request  -encouraged cessation     Depression and Anxiety  -Continue PTA regimen once able to take orally    Pancytopenia  -likely secondary to chronic alcoholism (bone marrow suppression) and gi bleed    Generalized deconditioning:  -He appears  "weak overall, evaluated by physical therapy, they are recommending TCU.       Diet: 2 Gram Sodium Diet     DVT Prophylaxis: Pneumatic Compression Devices   Leger Catheter: not present  Code Status: DNR/DNI     Disposition Plan    Expected discharge: 2 - 3 days, social work will find TCU   Entered: Gali Snyder MD 09/29/2019, 10:53 AM        The patient's care was discussed with the Bedside Nurse and Patient.    Gali Snyder MD  Hospitalist Service  [facility]    ______________________________________________________________________    Interval History   Did not require benzo overnight. No complaints today. Sleepy.     Data reviewed today: I reviewed all medications, new labs and imaging results over the last 24 hours. I personally reviewed no images or EKG's today.    Physical Exam   Vital signs:  Temp: 98.3  F (36.8  C) Temp src: Oral BP: 125/78 Pulse: 78 Heart Rate: 81 Resp: 16 SpO2: 96 % O2 Device: None (Room air)     Weight: 78.3 kg (172 lb 9.9 oz)  Estimated body mass index is 27.04 kg/m  as calculated from the following:    Height as of 7/10/19: 1.702 m (5' 7\").    Weight as of this encounter: 78.3 kg (172 lb 9.9 oz).      Wt Readings from Last 2 Encounters:   09/29/19 78.3 kg (172 lb 9.9 oz)   08/13/19 84 kg (185 lb 3 oz)       Gen: Somnolent however arouses to verbal stimuli and answers 1 or 2 simple questions appropriately.  Difficult to accurately assess orientation  HEENT:  no pallor  Resp: CTA B/L, normal WOB  CVS: RRR, no murmur  Abd/GI: Soft, mild distention, no tenderness.  skin: Warm, dry no rashes  MSK: No joint deformities, trace pedal edema  Neuro- CN- intact. No focal deficits.  Mild upper extremity tremors.      Data   Recent Labs   Lab 09/29/19  0843 09/28/19  1733 09/28/19  0941 09/26/19  0305  09/25/19  0025   WBC 3.7*  --  3.6* 3.0*   < > 3.0*   HGB 10.1*  --  10.8* 9.6*   < > 8.9*   MCV 97  --  96 96   < > 97   PLT 96*  --  112* 68*   < > 67*   INR  --   --   --   --   --  " 1.35*     --  135 134  --  136   POTASSIUM 3.5 3.4 3.2* 3.5   < > 3.0*   CHLORIDE 105  --  101 101  --  101   CO2 26  --  28 27  --  28   BUN 23  --  17 16  --  17   CR 1.10  --  1.07 1.02  --  0.89   ANIONGAP 7  --  6 6  --  7   KEV 8.4*  --  8.5 8.0*  --  7.5*   *  --  110* 108*  --  92   ALBUMIN 2.2*  --   --   --   --  2.5*   PROTTOTAL 6.1*  --   --   --   --  6.1*   BILITOTAL 2.0*  --   --   --   --  2.4*   ALKPHOS 281*  --   --   --   --  268*   ALT 35  --   --   --   --  34   AST 68*  --   --   --   --  147*    < > = values in this interval not displayed.       No results found for this or any previous visit (from the past 24 hour(s)).  Medications     - MEDICATION INSTRUCTIONS -         DULoxetine  60 mg Oral Daily     fluticasone-vilanterol  1 puff Inhalation Daily     folic acid  1 mg Oral Daily     furosemide  40 mg Oral Daily     lactulose  20 g Oral TID     mirtazapine  15 mg Oral At Bedtime     multivitamin w/minerals  1 tablet Oral Daily     nicotine  1 patch Transdermal Daily     nicotine   Transdermal Q8H     nicotine   Transdermal Daily     pantoprazole  40 mg Oral BID AC     QUEtiapine  50 mg Oral At Bedtime     sodium chloride (PF)  3 mL Intracatheter Q8H     spironolactone  50 mg Oral Daily     tamsulosin  0.4 mg Oral Daily     vitamin B1  100 mg Oral Daily

## 2019-09-29 NOTE — PLAN OF CARE
Discharge Planner PT   Patient plan for discharge: TCU  Current status: Pt received seated in chair, agreeable to PT though states his lunch should be arriving soon. Sit>stand with SBA and FWW. Ambulated 300' with FWW and SBA with slow pace, short step length, WBOS, decreased clearance, decreased heel strike. Incorporated head turns and pitches for balance challenge. Pt not able to significantly change gait speed. Performed 1x10 steps with B rails with alternating steps and SBA when ascending, step-to pattern and CGA when descending- needs extra time descending. Engaged in standing exercises with and without FWW support with SBA to promote strength and balance. Performed 30s sit<>stand with arms crossed over chest with SBA- completed 5 reps. Pt sitting up in chair upon departure with lunch in front of him.  Barriers to return to prior living situation: Needs assist with all mobility, falls risk, new AD use, stairs in the home  Recommendations for discharge: TCU  Rationale for recommendations: Currently, pt would benefit from continued PT to improve strength, balance, mobility to increase independence, reduce falls risk and increase safety before returning home. Pt is below his baseline mobility status. Pt demonstrates good progress with PT and pending LOS and continued progress may be appropriate to discharge home or to CD treatment.        Entered by: Monica Mcguire 09/29/2019 1:56 PM

## 2019-09-29 NOTE — PLAN OF CARE
AOx4, forgetful. Up with 1 + walker/ GB- ambulated x2 this shift. VSS on RA, denies pain. Tolerating 2 g Na diet. Voiding in BR, 1 formed BM during the day. Continuing with lactulose. CIWA scores of 3, 2, 2. BLE numbness baseline. Nicotine patch on L arm. Chem dep, PT and SW following. Discharge pending TCU placement.

## 2019-09-30 ENCOUNTER — APPOINTMENT (OUTPATIENT)
Dept: PHYSICAL THERAPY | Facility: CLINIC | Age: 64
DRG: 896 | End: 2019-09-30
Payer: MEDICARE

## 2019-09-30 LAB
ALBUMIN SERPL-MCNC: 2.3 G/DL (ref 3.4–5)
ALP SERPL-CCNC: 283 U/L (ref 40–150)
ALT SERPL W P-5'-P-CCNC: 34 U/L (ref 0–70)
ANION GAP SERPL CALCULATED.3IONS-SCNC: 4 MMOL/L (ref 3–14)
AST SERPL W P-5'-P-CCNC: 59 U/L (ref 0–45)
BILIRUB SERPL-MCNC: 1.9 MG/DL (ref 0.2–1.3)
BUN SERPL-MCNC: 24 MG/DL (ref 7–30)
CALCIUM SERPL-MCNC: 8.6 MG/DL (ref 8.5–10.1)
CHLORIDE SERPL-SCNC: 104 MMOL/L (ref 94–109)
CO2 SERPL-SCNC: 28 MMOL/L (ref 20–32)
CREAT SERPL-MCNC: 1.2 MG/DL (ref 0.66–1.25)
ERYTHROCYTE [DISTWIDTH] IN BLOOD BY AUTOMATED COUNT: 15.1 % (ref 10–15)
GFR SERPL CREATININE-BSD FRML MDRD: 63 ML/MIN/{1.73_M2}
GLUCOSE SERPL-MCNC: 98 MG/DL (ref 70–99)
HCT VFR BLD AUTO: 30.3 % (ref 40–53)
HGB BLD-MCNC: 10.2 G/DL (ref 13.3–17.7)
MAGNESIUM SERPL-MCNC: 1.4 MG/DL (ref 1.6–2.3)
MAGNESIUM SERPL-MCNC: 2.9 MG/DL (ref 1.6–2.3)
MCH RBC QN AUTO: 32.4 PG (ref 26.5–33)
MCHC RBC AUTO-ENTMCNC: 33.7 G/DL (ref 31.5–36.5)
MCV RBC AUTO: 96 FL (ref 78–100)
PLATELET # BLD AUTO: 105 10E9/L (ref 150–450)
POTASSIUM SERPL-SCNC: 3.6 MMOL/L (ref 3.4–5.3)
PROT SERPL-MCNC: 6.3 G/DL (ref 6.8–8.8)
RBC # BLD AUTO: 3.15 10E12/L (ref 4.4–5.9)
SODIUM SERPL-SCNC: 136 MMOL/L (ref 133–144)
WBC # BLD AUTO: 3.6 10E9/L (ref 4–11)

## 2019-09-30 PROCEDURE — 83735 ASSAY OF MAGNESIUM: CPT | Performed by: INTERNAL MEDICINE

## 2019-09-30 PROCEDURE — 97116 GAIT TRAINING THERAPY: CPT | Mod: GP | Performed by: PHYSICAL THERAPIST

## 2019-09-30 PROCEDURE — 36415 COLL VENOUS BLD VENIPUNCTURE: CPT | Performed by: INTERNAL MEDICINE

## 2019-09-30 PROCEDURE — 25000128 H RX IP 250 OP 636: Performed by: HOSPITALIST

## 2019-09-30 PROCEDURE — 12000000 ZZH R&B MED SURG/OB

## 2019-09-30 PROCEDURE — 99232 SBSQ HOSP IP/OBS MODERATE 35: CPT | Performed by: INTERNAL MEDICINE

## 2019-09-30 PROCEDURE — 97110 THERAPEUTIC EXERCISES: CPT | Mod: GP | Performed by: PHYSICAL THERAPIST

## 2019-09-30 PROCEDURE — 80053 COMPREHEN METABOLIC PANEL: CPT | Performed by: INTERNAL MEDICINE

## 2019-09-30 PROCEDURE — 25000132 ZZH RX MED GY IP 250 OP 250 PS 637: Mod: GY | Performed by: PHYSICIAN ASSISTANT

## 2019-09-30 PROCEDURE — 85027 COMPLETE CBC AUTOMATED: CPT | Performed by: INTERNAL MEDICINE

## 2019-09-30 PROCEDURE — 25000132 ZZH RX MED GY IP 250 OP 250 PS 637: Mod: GY | Performed by: HOSPITALIST

## 2019-09-30 PROCEDURE — 25000132 ZZH RX MED GY IP 250 OP 250 PS 637: Mod: GY | Performed by: INTERNAL MEDICINE

## 2019-09-30 RX ORDER — LACTULOSE 10 G/15ML
20 SOLUTION ORAL 2 TIMES DAILY
Status: DISCONTINUED | OUTPATIENT
Start: 2019-09-30 | End: 2019-10-01 | Stop reason: HOSPADM

## 2019-09-30 RX ADMIN — NICOTINE 1 PATCH: 21 PATCH, EXTENDED RELEASE TRANSDERMAL at 08:07

## 2019-09-30 RX ADMIN — LACTULOSE 20 G: 20 SOLUTION ORAL at 21:21

## 2019-09-30 RX ADMIN — MAGNESIUM SULFATE HEPTAHYDRATE 4 G: 40 INJECTION, SOLUTION INTRAVENOUS at 09:13

## 2019-09-30 RX ADMIN — MULTIPLE VITAMINS W/ MINERALS TAB 1 TABLET: TAB at 08:08

## 2019-09-30 RX ADMIN — FLUTICASONE FUROATE AND VILANTEROL TRIFENATATE 1 PUFF: 200; 25 POWDER RESPIRATORY (INHALATION) at 08:07

## 2019-09-30 RX ADMIN — PANTOPRAZOLE SODIUM 40 MG: 40 TABLET, DELAYED RELEASE ORAL at 06:23

## 2019-09-30 RX ADMIN — PANTOPRAZOLE SODIUM 40 MG: 40 TABLET, DELAYED RELEASE ORAL at 15:43

## 2019-09-30 RX ADMIN — FUROSEMIDE 40 MG: 40 TABLET ORAL at 08:08

## 2019-09-30 RX ADMIN — Medication 100 MG: at 08:08

## 2019-09-30 RX ADMIN — SPIRONOLACTONE 50 MG: 25 TABLET ORAL at 08:07

## 2019-09-30 RX ADMIN — QUETIAPINE 50 MG: 25 TABLET ORAL at 21:21

## 2019-09-30 RX ADMIN — DULOXETINE HYDROCHLORIDE 60 MG: 60 CAPSULE, DELAYED RELEASE ORAL at 08:08

## 2019-09-30 RX ADMIN — LACTULOSE 20 G: 20 SOLUTION ORAL at 08:07

## 2019-09-30 RX ADMIN — FOLIC ACID 1 MG: 1 TABLET ORAL at 08:08

## 2019-09-30 RX ADMIN — TAMSULOSIN HYDROCHLORIDE 0.4 MG: 0.4 CAPSULE ORAL at 08:08

## 2019-09-30 RX ADMIN — MIRTAZAPINE 15 MG: 15 TABLET, FILM COATED ORAL at 21:21

## 2019-09-30 ASSESSMENT — ACTIVITIES OF DAILY LIVING (ADL)
ADLS_ACUITY_SCORE: 23
ADLS_ACUITY_SCORE: 24
ADLS_ACUITY_SCORE: 23
ADLS_ACUITY_SCORE: 24
ADLS_ACUITY_SCORE: 24
ADLS_ACUITY_SCORE: 23

## 2019-09-30 NOTE — PROGRESS NOTES
DATE & TIME: 1900-0730 9/29/19    Cognitive Concerns/ Orientation : A/Ox4   BEHAVIOR & AGGRESSION TOOL COLOR: green   CIWA SCORE:    ABNL VS/O2: VSS. RA  MOBILITY: Ax1; walker   PAIN MANAGMENT: denies at this time   DIET: 2g Na   BOWEL/BLADDER: up to bathroom   ABNL LAB/BG: K 3.5. Mg 1.8.   DRAIN/DEVICES: saline locked   TELEMETRY RHYTHM: SR with BBB  SKIN: skin tears on BUE  TESTS/PROCEDURES:   D/C DAY/GOALS/PLACE: Discharge once SW finds TCU   OTHER IMPORTANT INFO:

## 2019-09-30 NOTE — PROGRESS NOTES
SW:  D:  Will meet with patient tomorrow morning to discuss possible options for CD treatment other than using his Medicare benefit thru [facility].

## 2019-09-30 NOTE — PROGRESS NOTES
[facility]  Hospitalist Progress Note  Name: Jim Richard    MRN: [id number]  Physician:  Mich Wilson DO, FHM (Text Page)    Summary of Stay:  Jim Richard is a pleasant 64 year old male with history of alcoholic liver disease, ulcers, esophageal varices, CAD, hypertension, hyperlipidemia, peripheral vascular disease, tobacco use, subdural hematoma, and chronic low back pain who presented to the ED with generalized weakness. He also reported melena x 2 days that has since improved.     Assessment & Plan    Suspected Upper GI bleed  Anemia, acute on chronic  History of duodenal ulcer and esophageal varices:  -Presented with reported melena.  Hgb 8.9 on admission, slightly lower than recent baseline of around 9.5-10. Now 9.6.   -Evaluated by GI on 9/25, upper GI endoscopy without evidence of bleeding   -Protonix 40 mg p.o. twice daily  -  GI also recommended:  Titrate lactulose to 2 BM per day given he was complaining of diarrhea.  Etoh cessation encouraged.  Follow up in GI clinic in 1-2 weeks.    Acute Alcohol withdrawal  Alcoholic liver disease  Recurrent ascites  Acute encephalopathy:  -Confusion related to Etoh withdrawal and BNZ.  Mentation now back to baseline.  Withdrawal appears resolved/scores low.    -Status post therapeutic paracentesis this hospital stay with removal of 3500 mL of straw-colored fluid.  Needs outpatient f/u for liver issues/GI.  Spironolactone, lasix continued.    -Chemical dependency counselor saw during stay.  Patient recommended to consider [facility] but he is not interested there.  Psychiatry also saw.      Hypokalemia and hypomagnesemia:  -Follow, has been replaced during stay.     Tobacco abuse:  -pack a day smoker  -21 mg patch ordered per request  -encouraged cessation during stay     Depression and Anxiety:  -Continue PTA regimen once able to take orally     Pancytopenia:  -likely secondary to chronic alcoholism (bone marrow suppression) and gi  bleed, recheck outpatient.     Generalized deconditioning:  -He appears weak overall, evaluated by physical therapy.  Initially TCU recommended but he is improving considerably.  Now likely can d/c home.  I also asked OT to see him for a home safety eval.       Diet: 2 Gram Sodium Diet    DVT Prophylaxis: Pneumatic Compression Devices  Leger Catheter: not present  Code Status: DNR/DNI      Disposition Plan   Expected discharge tomorrow to home.  SW will meet again with him regarding CD resources tomorrow AM.     Entered: Mich Wilson 09/30/2019, 5:34 PM        Interval History   Assumed care for the day, history reviewed.  No chest pain, sob, nausea.  He is feeling stronger, less shaky.  He hopes to discharge home instead of to TCU.  Doesn't want to pursue [facility] for alcohol tx.    -Data reviewed today: I reviewed all new labs and imaging reports over the last 24 hours. I personally reviewed no images or EKG's today.    Physical Exam   Temp: 97.9  F (36.6  C) Temp src: Oral BP: 134/83   Heart Rate: 75 Resp: 18 SpO2: 99 % O2 Device: None (Room air)    Vitals:    09/27/19 0500 09/29/19 0645 09/30/19 0246   Weight: 81.1 kg (178 lb 12.8 oz) 78.3 kg (172 lb 9.9 oz) 75.4 kg (166 lb 3.2 oz)     Vital Signs with Ranges  Temp:  [97.9  F (36.6  C)-98.7  F (37.1  C)] 97.9  F (36.6  C)  Heart Rate:  [72-75] 75  Resp:  [16-18] 18  BP: (128-135)/(73-84) 134/83  SpO2:  [95 %-99 %] 99 %  I/O last 3 completed shifts:  In: 480 [P.O.:480]  Out: -     GEN:  Alert, oriented x 3, appears comfortable, no overt distress.  HEENT:  Normocephalic/atraumatic, no scleral icterus, no nasal discharge, mouth moist.  CV:  Regular rate and rhythm, no murmur or JVD.  S1 + S2 noted, no S3 or S4.  LUNGS:  Clear to auscultation bilaterally without rales/rhonchi/wheezing/retractions.  Symmetric chest rise on inhalation noted.  ABD:  Active bowel sounds, soft, non-tender/non-distended.  No rebound/guarding/rigidity.  EXT:  No edema.  No cyanosis.   No acute joint synovitis noted.  SKIN:  Dry to touch, no exanthems noted in the visualized areas.    Medications     - MEDICATION INSTRUCTIONS -         DULoxetine  60 mg Oral Daily     fluticasone-vilanterol  1 puff Inhalation Daily     folic acid  1 mg Oral Daily     furosemide  40 mg Oral Daily     lactulose  20 g Oral BID     mirtazapine  15 mg Oral At Bedtime     multivitamin w/minerals  1 tablet Oral Daily     nicotine  1 patch Transdermal Daily     nicotine   Transdermal Q8H     nicotine   Transdermal Daily     pantoprazole  40 mg Oral BID AC     QUEtiapine  50 mg Oral At Bedtime     sodium chloride (PF)  3 mL Intracatheter Q8H     spironolactone  50 mg Oral Daily     tamsulosin  0.4 mg Oral Daily     Data     Recent Labs   Lab 09/30/19  0700 09/29/19  0843 09/28/19  0941   WBC 3.6* 3.7* 3.6*   HGB 10.2* 10.1* 10.8*   HCT 30.3* 30.3* 32.1*   MCV 96 97 96   * 96* 112*     Recent Labs   Lab 09/30/19  1121 09/30/19  0700 09/29/19  0843 09/28/19  1733 09/28/19  0941 09/26/19  0305  09/25/19  0025   NA  --  136 138  --  135 134  --  136   POTASSIUM  --  3.6 3.5 3.4 3.2* 3.5   < > 3.0*   CHLORIDE  --  104 105  --  101 101  --  101   CO2  --  28 26  --  28 27  --  28   ANIONGAP  --  4 7  --  6 6  --  7   GLC  --  98 125*  --  110* 108*  --  92   BUN  --  24 23  --  17 16  --  17   CR  --  1.20 1.10  --  1.07 1.02  --  0.89   GFRESTIMATED  --  63 70  --  72 76  --  90   GFRESTBLACK  --  73 81  --  84 89  --  >90   KEV  --  8.6 8.4*  --  8.5 8.0*  --  7.5*   MAG 2.9* 1.4*  --   --   --  1.8   < > 0.9*   PROTTOTAL  --  6.3* 6.1*  --   --   --   --  6.1*   ALBUMIN  --  2.3* 2.2*  --   --   --   --  2.5*   BILITOTAL  --  1.9* 2.0*  --   --   --   --  2.4*   ALKPHOS  --  283* 281*  --   --   --   --  268*   AST  --  59* 68*  --   --   --   --  147*   ALT  --  34 35  --   --   --   --  34    < > = values in this interval not displayed.       No results found for this or any previous visit (from the past 24  hour(s)).

## 2019-09-30 NOTE — PLAN OF CARE
"Discharge Planner PT   Patient plan for discharge: possibly home  Current status: Pt has much improved since admit, able to complete transfers with SBA/ mod I with FWW, amb with FWW with SBA/mod I 480', completes 20 steps with SBA with 1 rail.  Mod I with B rails.  Joce LE ex in standing with UE support.  Barriers to return to prior living situation: none  Recommendations for discharge: home  Rationale for recommendations: Pt appears to be reaching goals for safe discharge to home.  Pt has FWW at home and was previously I with all mob and ADL\"s, pt feels comfortable to return home.  Would benefit from OT eval for ADL check.       Entered by: JEROMY SHAH 09/30/2019 2:10 PM       "

## 2019-09-30 NOTE — PROGRESS NOTES
[facility]  Gastroenterology Progress Note     Jim Richard MRN# [id number]   YOB: 1955 Age: 64 year old          Assessment and Plan:   Alcohol withdrawal  Alcoholic liver cirrhosis-  Patient appears clinically improved. Patient has had stable labs. LFTs stable. No abdomina pain. Tolerating diet. Has complaints of frequent watery bowel movements.  1. On lactulose TID. Will decrease to BID and check ammonia level.   2. Daily CMP and CBC  3. Continue CIWA protocol  3. Status post therapeutic paracentesis with this hospitalization- 3500 L of straw colored fluid removed. Abdomen distended but tympanic. No need for repeat paracentesis at this time  4. Chemical dependency is following  5. Hemoglobin stable- EGD revealed no evidence of GI bleed  6. Ok with GI to discharge patient with plans to f/u as an outpatient in 1-2 weeks              Interval History:   no new complaints, doing well, denies chest pain, denies shortness of breath, denies abdominal pain, has had a bowel movement in the last 24 hours and doing well; no cp, sob, n/v/d, or abd pain.              Review of Systems:   C: NEGATIVE for fever, chills, change in weight  E/M: NEGATIVE for ear, mouth and throat problems  R: NEGATIVE for significant cough or SOB  CV: NEGATIVE for chest pain, palpitations or peripheral edema             Medications:   I have reviewed this patient's current medications    DULoxetine  60 mg Oral Daily     fluticasone-vilanterol  1 puff Inhalation Daily     folic acid  1 mg Oral Daily     furosemide  40 mg Oral Daily     lactulose  20 g Oral BID     mirtazapine  15 mg Oral At Bedtime     multivitamin w/minerals  1 tablet Oral Daily     nicotine  1 patch Transdermal Daily     nicotine   Transdermal Q8H     nicotine   Transdermal Daily     pantoprazole  40 mg Oral BID AC     QUEtiapine  50 mg Oral At Bedtime     sodium chloride (PF)  3 mL Intracatheter Q8H     spironolactone  50 mg Oral Daily      tamsulosin  0.4 mg Oral Daily                  Physical Exam:   Vitals were reviewed  Vital Signs with Ranges  Temp:  [98.1  F (36.7  C)-98.7  F (37.1  C)] 98.6  F (37  C)  Heart Rate:  [72] 72  Resp:  [16-18] 18  BP: (128-136)/(73-82) 132/73  SpO2:  [95 %-97 %] 95 %  I/O last 3 completed shifts:  In: 360 [P.O.:360]  Out: -   Constitutional: healthy, alert and no distress   Cardiovascular: negative, PMI normal. No lifts, heaves, or thrills. RRR. No murmurs, clicks gallops or rub  Respiratory: negative, Percussion normal. Good diaphragmatic excursion. Lungs clear  Head: Normocephalic. No masses, lesions, tenderness or abnormalities  Neck: Neck supple. No adenopathy. Thyroid symmetric, normal size,, Carotids without bruits.  Abdomen: Abdomen soft, mildly distended and tympanic non-tender. BS normal. No masses, organomegaly           Data:   I reviewed the patient's new clinical lab test results.   Recent Labs   Lab Test 09/30/19  0700 09/29/19  0843 09/28/19  0941  09/25/19  0025  08/06/19  0739  08/01/19  1629   WBC 3.6* 3.7* 3.6*   < > 3.0*   < > 5.2   < > 3.8*   HGB 10.2* 10.1* 10.8*   < > 8.9*   < > 10.8*   < > 8.7*   MCV 96 97 96   < > 97   < > 91   < > 95   * 96* 112*   < > 67*   < > 191   < > 155   INR  --   --   --   --  1.35*  --  1.31*  --  1.18*    < > = values in this interval not displayed.     Recent Labs   Lab Test 09/30/19  0700 09/29/19  0843 09/28/19  1733 09/28/19  0941   POTASSIUM 3.6 3.5 3.4 3.2*   CHLORIDE 104 105  --  101   CO2 28 26  --  28   BUN 24 23  --  17   ANIONGAP 4 7  --  6     Recent Labs   Lab Test 09/30/19  0700 09/29/19  0843 09/25/19  0025  02/13/19  2340  09/29/18  0550  03/21/18  1555  09/25/14  0510  06/12/12  0550   ALBUMIN 2.3* 2.2* 2.5*   < > 3.1*   < > 3.2*   < > 2.9*   < >  --    < >  --    BILITOTAL 1.9* 2.0* 2.4*   < > 0.4   < > 1.0   < > 0.9   < >  --    < >  --    ALT 34 35 34   < > 41   < > 60   < > 74*   < >  --    < >  --    AST 59* 68* 147*   < > 89*   < >  140*   < > 136*   < >  --    < >  --    PROTEIN  --   --   --   --   --   --   --   --   --   --  Negative  --  Negative   LIPASE  --   --   --   --  536*  --  585*  --  316  --   --   --   --     < > = values in this interval not displayed.       I reviewed the patient's new imaging results.    All laboratory data reviewed  All imaging studies reviewed by me.    Gloria De Leon PA-C,  9/30/2019  [facility]  Office : [phone]  Cell: [phone] (Dr. Shaw)  Cell: [phone] (Gloria De Leon PA-C)

## 2019-09-30 NOTE — PLAN OF CARE
DATE & TIME: 9/30/2019 0549-5674  Cognitive Concerns/ Orientation : A&O x4, calm and cooperative   BEHAVIOR & AGGRESSION TOOL COLOR: Green  CIWA SCORE: 2, 2, 2  ABNL VS/O2: VSS on RA  MOBILITY: Independent  PAIN MANAGMENT: Denies pain  DIET: 2gm Na  BOWEL/BLADDER: Continent   ABNL LAB/BG: Bili 1.9, WBC 3.6, AST 59, Hgb 10.2, Plts 105, Mg 1.4 (replaced) recheck 2.9  DRAIN/DEVICES: IV SL  TELEMETRY RHYTHM: SR with BBB  SKIN: Bruised, skin tears and abrasions to elbows and arms, covered with foam dressings. BLE lemuel, +1-2 BLE edema noted.   TESTS/PROCEDURES: None  D/C DAY/GOALS/PLACE: Discharge pending to TCU  OTHER IMPORTANT INFO: LS clear, BS active x4. Pt states he has a baseline tremor. Infrequent, dry non-productive cough noted. Abdomen distended, on Lactulose. Up to chair for meals, walked in halls with staff, showered. Nicotine patch in place on L deltoid. Numbness and tingling noted in hands and feet per pt. Social and PT following.

## 2019-10-01 ENCOUNTER — APPOINTMENT (OUTPATIENT)
Dept: OCCUPATIONAL THERAPY | Facility: CLINIC | Age: 64
DRG: 896 | End: 2019-10-01
Attending: INTERNAL MEDICINE
Payer: MEDICARE

## 2019-10-01 VITALS
TEMPERATURE: 99.3 F | SYSTOLIC BLOOD PRESSURE: 109 MMHG | DIASTOLIC BLOOD PRESSURE: 69 MMHG | BODY MASS INDEX: 26.16 KG/M2 | RESPIRATION RATE: 16 BRPM | HEART RATE: 78 BPM | WEIGHT: 167 LBS | OXYGEN SATURATION: 97 %

## 2019-10-01 LAB
AMMONIA PLAS-SCNC: 41 UMOL/L (ref 10–50)
MAGNESIUM SERPL-MCNC: 1.9 MG/DL (ref 1.6–2.3)

## 2019-10-01 PROCEDURE — 82140 ASSAY OF AMMONIA: CPT | Performed by: PHYSICIAN ASSISTANT

## 2019-10-01 PROCEDURE — 97165 OT EVAL LOW COMPLEX 30 MIN: CPT | Mod: GO

## 2019-10-01 PROCEDURE — 25000132 ZZH RX MED GY IP 250 OP 250 PS 637: Mod: GY | Performed by: HOSPITALIST

## 2019-10-01 PROCEDURE — 97535 SELF CARE MNGMENT TRAINING: CPT | Mod: GO

## 2019-10-01 PROCEDURE — 25000132 ZZH RX MED GY IP 250 OP 250 PS 637: Mod: GY | Performed by: INTERNAL MEDICINE

## 2019-10-01 PROCEDURE — 25000132 ZZH RX MED GY IP 250 OP 250 PS 637: Mod: GY | Performed by: PHYSICIAN ASSISTANT

## 2019-10-01 PROCEDURE — 83735 ASSAY OF MAGNESIUM: CPT | Performed by: INTERNAL MEDICINE

## 2019-10-01 PROCEDURE — 99238 HOSP IP/OBS DSCHRG MGMT 30/<: CPT | Performed by: INTERNAL MEDICINE

## 2019-10-01 PROCEDURE — 36415 COLL VENOUS BLD VENIPUNCTURE: CPT | Performed by: PHYSICIAN ASSISTANT

## 2019-10-01 RX ORDER — LACTULOSE 10 G/15ML
20 SOLUTION ORAL 2 TIMES DAILY
Qty: 1800 ML | Refills: 1 | Status: ON HOLD | OUTPATIENT
Start: 2019-10-01 | End: 2019-10-30

## 2019-10-01 RX ADMIN — TAMSULOSIN HYDROCHLORIDE 0.4 MG: 0.4 CAPSULE ORAL at 08:19

## 2019-10-01 RX ADMIN — PANTOPRAZOLE SODIUM 40 MG: 40 TABLET, DELAYED RELEASE ORAL at 06:42

## 2019-10-01 RX ADMIN — FLUTICASONE FUROATE AND VILANTEROL TRIFENATATE 1 PUFF: 200; 25 POWDER RESPIRATORY (INHALATION) at 08:18

## 2019-10-01 RX ADMIN — MULTIPLE VITAMINS W/ MINERALS TAB 1 TABLET: TAB at 08:19

## 2019-10-01 RX ADMIN — FUROSEMIDE 40 MG: 40 TABLET ORAL at 08:19

## 2019-10-01 RX ADMIN — LACTULOSE 20 G: 20 SOLUTION ORAL at 08:19

## 2019-10-01 RX ADMIN — DULOXETINE HYDROCHLORIDE 60 MG: 60 CAPSULE, DELAYED RELEASE ORAL at 08:19

## 2019-10-01 RX ADMIN — NICOTINE 1 PATCH: 21 PATCH, EXTENDED RELEASE TRANSDERMAL at 08:18

## 2019-10-01 RX ADMIN — SPIRONOLACTONE 50 MG: 25 TABLET ORAL at 08:19

## 2019-10-01 RX ADMIN — FOLIC ACID 1 MG: 1 TABLET ORAL at 08:19

## 2019-10-01 ASSESSMENT — ACTIVITIES OF DAILY LIVING (ADL)
ADLS_ACUITY_SCORE: 23
ADLS_ACUITY_SCORE: 23
ADLS_ACUITY_SCORE: 24
PREVIOUS_RESPONSIBILITIES: MEAL PREP;HOUSEKEEPING;LAUNDRY;SHOPPING;DRIVING;MEDICATION MANAGEMENT;FINANCES

## 2019-10-01 NOTE — DISCHARGE SUMMARY
[facility]  Discharge Summary        Jim Richard MRN# [id number]   YOB: 1955 Age: 64 year old     Date of Admission:  9/25/2019  Date of Discharge:  10/1/2019  Admitting Physician:  Jude Ledesma MD  Discharge Physician: Donell Lucas MD  Discharging Service: Hospitalist     Primary Provider:  Talon Elias  Primary Care Physician Phone Number: [phone]         Discharge Diagnoses/Problem Oriented Hospital Course (Providers):    Jim Richard was admitted on 9/25/2019 by Jude Ledesma MD and I would refer you to their history and physical.  Jim Richard is a pleasant 64 year old male with history of alcoholic liver disease, ulcers, esophageal varices, CAD, hypertension, hyperlipidemia, peripheral vascular disease, tobacco use, subdural hematoma, and chronic low back pain who presented to the ED with generalized weakness. He also reported melena x 2 days that has since improved.        Suspected Upper GI bleed  Anemia, acute on chronic  History of duodenal ulcer and esophageal varices:  -Presented with reported melena.  Hgb 8.9 on admission, slightly lower than recent baseline of around 9.5-10. Now 9.6.   -Evaluated by GI on 9/25, upper GI endoscopy without evidence of bleeding   -continue Protonix 40 mg p.o. daily  -  GI also recommended:  Titrate lactulose to 2 BM per day given he was complaining of diarrhea.   - Etoh cessation encouraged.   - Follow up in GI clinic in 1-2 weeks.     Acute Alcohol withdrawal  Alcoholic liver disease  Recurrent ascites  Acute encephalopathy:  -Confusion related to Etoh withdrawal and BNZ.  Mentation now back to baseline.   - Withdrawal appears resolved/scores low.   -Status post therapeutic paracentesis this hospital stay with removal of 3500 mL of straw-colored fluid.   - Needs outpatient f/u for liver issues/GI.  Spironolactone, lasix continued.  - being discharged with lactulose to titrate at  least 2 BM daily.   -Chemical dependency counselor saw during stay.  Patient recommended to consider [facility] but he is not interested there.  Psychiatry also saw.   - He is willing to go back to      Hypokalemia and hypomagnesemia:  -Follow, has been replaced during stay.     Tobacco abuse:  -pack a day smoker  -21 mg patch ordered per request  -encouraged cessation during stay     Depression and Anxiety:  -Continue PTA regimen once able to take orally     Pancytopenia:  -likely secondary to chronic alcoholism (bone marrow suppression) and gi bleed, recheck outpatient.     Generalized deconditioning:  -He appears weak overall, evaluated by physical therapy.  Initially TCU recommended but he is improving considerably.  Now likely can d/c home.              Code Status:      DNR / DNI         Important Results:      NAD, very mildly tremulous  HEENT normal  PULM CTA  CV RRR  GI MILDLY DISTENDED, SOFT, BS  MS +1 EDEMA  NEURO NON FOCAL           Pending Results:        Unresulted Labs Ordered in the Past 30 Days of this Admission     Date and Time Order Name Status Description    9/26/2019 0305 Potassium In process                Discharge Instructions and Follow-Up:      Follow-up Appointments     Follow-up and recommended labs and tests       Follow up with primary care provider, FERNANDA BRANTLEY, as scheduled   for you on Monday 10/7/19 at 11:20AM for hospital follow- up.    Vilma Romo ([phone])         Follow-up and recommended labs and tests       Follow up with primary care provider, FERNANDA BRANTLEY, within 7 days   for hospital follow- up.  The following labs/tests are recommended: cbc,   bmp.    Follow up with liver clinic as directed                  Discharge Disposition:      Discharged to home         Discharge Medications:        Current Discharge Medication List      START taking these medications    Details   lactulose (CHRONULAC) 10 GM/15ML solution Take 30 mLs (20 g) by mouth 2  times daily  Qty: 1800 mL, Refills: 1    Associated Diagnoses: Alcoholic hepatitis without ascites         CONTINUE these medications which have NOT CHANGED    Details   albuterol (PROAIR HFA/PROVENTIL HFA/VENTOLIN HFA) 108 (90 BASE) MCG/ACT Inhaler Inhale 2 puffs into the lungs every 6 hours as needed       DULoxetine (CYMBALTA) 60 MG EC capsule Take 1 capsule (60 mg) by mouth daily  Qty: 30 capsule, Refills: 0    Associated Diagnoses: 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)      fluticasone-vilanterol (BREO ELLIPTA) 200-25 MCG/INH oral inhaler Inhale 1 puff into the lungs daily       folic acid (FOLVITE) 1 MG tablet Take 1 tablet (1 mg) by mouth daily  Qty: 30 tablet, Refills: 0    Comments: Future refills by PCP Dr. FERNANDA BRANTLEY with phone number [phone].  Associated Diagnoses: Alcoholic cirrhosis of liver with ascites (H)      furosemide (LASIX) 40 MG tablet Take 1 tablet (40 mg) by mouth daily  Qty: 30 tablet, Refills: 0    Comments: Future refills by PCP Dr. FERNANDA BRANTLEY with phone number [phone].  Associated Diagnoses: Alcoholic cirrhosis of liver with ascites (H)      mirtazapine (REMERON) 15 MG tablet Take 15 mg by mouth At Bedtime      multivitamin, therapeutic with minerals (THERA-VIT-M) TABS tablet Take 1 tablet by mouth daily  Qty: 30 each, Refills: 0    Associated Diagnoses: Alcoholic intoxication without complication (H)      nicotine (NICODERM CQ) 21 MG/24HR 24 hr patch Place 1 patch onto the skin daily  Qty: 7 patch, Refills: 0    Comments: Future refills by PCP Dr. FERNANDA BRANTLEY with phone number [phone].  Associated Diagnoses: Alcoholic cirrhosis of liver with ascites (H)      pantoprazole (PROTONIX) 40 MG EC tablet Take 1 tablet (40 mg) by mouth every morning (before breakfast)  Qty: 30 tablet, Refills: 0    Comments: Future refills by PCP Dr. FERNANDA BRANTLEY with phone number [phone].  Associated Diagnoses: Alcoholic cirrhosis  of liver with ascites (H)      propranolol (INDERAL) 10 MG tablet Take 1 tablet (10 mg) by mouth 2 times daily  Qty: 60 tablet, Refills: 0    Comments: Future refills by PCP Dr. FERNANDA BRANTLEY with phone number [phone].  Associated Diagnoses: Alcoholic cirrhosis of liver with ascites (H)      QUEtiapine (SEROQUEL) 50 MG tablet Take 50 mg by mouth At Bedtime       spironolactone (ALDACTONE) 25 MG tablet Take 2 tablets (50 mg) by mouth daily  Qty: 30 tablet, Refills: 0    Associated Diagnoses: Alcoholic cirrhosis of liver with ascites (H)      tamsulosin (FLOMAX) 0.4 MG capsule Take 0.4 mg by mouth daily      vitamin B1 (THIAMINE) 100 MG tablet Take 1 tablet (100 mg) by mouth daily  Qty: 30 tablet, Refills: 0    Comments: Future refills by PCP Dr. FERNANDA BRANTLEY with phone number [phone].  Associated Diagnoses: Alcohol withdrawal syndrome with complication (H)      order for DME Equipment being ordered: Walker Wheels () and Walker ()  Treatment Diagnosis: difficulty walking  Qty: 1 each, Refills: 0    Associated Diagnoses: Spinal stenosis, lumbar region, with neurogenic claudication                  Allergies:         Allergies   Allergen Reactions     Amlodipine Swelling     Lisinopril      Other reaction(s): Angioedema  Mouth and tongue swelling   Mouth and tongue swelling               Consultations This Hospital Stay:      Consultation during this admission received from gastroenterology          Discharge Orders for Skilled Facility (from Discharge Orders):        After Care Instructions     Activity      Your activity upon discharge: activity as tolerated         Diet      Follow this diet upon discharge: Orders Placed This Encounter      2 Gram Sodium Diet         Discharge Instructions      1. Stop alcohol  2. Follow up with outpatient CD evaluation and treatment  3. Stop smoking                      Discharge Time:      Less than 30 minutes.        Image Results From This  Hospital Stay (For Non-EPIC Providers):        Results for orders placed or performed during the hospital encounter of 09/25/19   US Paracentesis    Narrative    EXAM: US PARACENTESIS  9/25/2019 1:46 PM       HISTORY:ascites, alcoholic liver disease       PROCEDURE:  Written informed consent was obtained from the patient  prior to the procedure. The risks and benefits including bleeding,  infection and abdominal organ injury were discussed and the patient  wished to continue. Initial ultrasound images demonstrated a large  left lower quadrant fluid collection. The skin overlying this  collection was marked, prepped, draped and anesthetized in usual  sterile fashion utilizing 10mL 1% Lidocaine . The paracentesis  catheter was then placed into the peritoneal fluid collection with  return of 3500 mL of straw-colored fluid. Patient tolerated the  procedure well. The patient will receive intravenous albumin on the  usual sliding scale as needed per protocol.      US guidance was utilized to enter the peritoneal space for  paracentesis with permanent image recoding.      Impression    IMPRESSION:  Ultrasound-guided paracentesis.       PORSHA BOB PA-C           Most Recent Lab Results In EPIC (For Non-EPIC Providers):    Most Recent 3 CBC's:  Recent Labs   Lab Test 09/30/19  0700 09/29/19  0843 09/28/19  0941   WBC 3.6* 3.7* 3.6*   HGB 10.2* 10.1* 10.8*   MCV 96 97 96   * 96* 112*      Most Recent 3 BMP's:  Recent Labs   Lab Test 09/30/19  0700 09/29/19  0843 09/28/19  1733 09/28/19  0941    138  --  135   POTASSIUM 3.6 3.5 3.4 3.2*   CHLORIDE 104 105  --  101   CO2 28 26  --  28   BUN 24 23  --  17   CR 1.20 1.10  --  1.07   ANIONGAP 4 7  --  6   KEV 8.6 8.4*  --  8.5   GLC 98 125*  --  110*     Most Recent 3 Troponin's:  Recent Labs   Lab Test 04/26/18  1940   TROPI <0.015     Most Recent 3 INR's:  Recent Labs   Lab Test 09/25/19  0025 08/06/19  0739 08/01/19  1629   INR 1.35* 1.31* 1.18*     Most Recent  2 LFT's:  Recent Labs   Lab Test 09/30/19  0700 09/29/19  0843   AST 59* 68*   ALT 34 35   ALKPHOS 283* 281*   BILITOTAL 1.9* 2.0*     Most Recent Cholesterol Panel:  Recent Labs   Lab Test 08/13/19  0744   CHOL 113   LDL 70   HDL 15*   TRIG 140     Most Recent 6 Bacteria Isolates From Any Culture (See EPIC Reports for Culture Details):  Recent Labs   Lab Test 08/02/19  0840 09/29/18  1359 09/29/18  1352 05/02/18  1535 05/02/18  1530 10/08/16  1150   CULT No growth No growth No growth No growth No growth Moderate growth Normal trevin     Most Recent TSH, T4 and HgbA1c:  Recent Labs   Lab Test 03/22/18  0430 02/08/17  1640   TSH  --  1.51   A1C Canceled, Test credited  --

## 2019-10-01 NOTE — DISCHARGE INSTRUCTIONS
These clinics provide Rule 25 assessments for [facility] residents.    [facility] [address] [facility] [phone]    [facility] [address] [facility]  [phone]    [facility]  [address]  [facility]  [phone]  [address] [facility]  [address]  [address]  [place]  [phone]    For more locations where you can obtain a Rule 25 assessment you can contact [facility]   [facility] at   [phone]

## 2019-10-01 NOTE — PROGRESS NOTES
10/01/19 0928   Quick Adds   Type of Visit Initial Occupational Therapy Evaluation   Living Environment   Lives With   (roommate )   Living Arrangements   (split entry town home )   Home Accessibility stairs to enter home;stairs within home   Transportation Anticipated   (Pt typically drives )   Living Environment Comment Roommate can assist as needed, however works full time. Pt reported that roommate can assist in transportation as needed.    Self-Care   Usual Activity Tolerance good   Current Activity Tolerance moderate   Regular Exercise No   Equipment Currently Used at Home grab bar, tub/shower;walker, standard   Activity/Exercise/Self-Care Comment Pt using a walker only for distances.    Functional Level   Ambulation 1-->assistive equipment   Transferring 0-->independent   Toileting 0-->independent   Bathing 0-->independent   Dressing 0-->independent   Fall history within last six months yes   Number of times patient has fallen within last six months 1  (per pt )   General Information   Onset of Illness/Injury or Date of Surgery - Date 09/25/19   Referring Physician Mich Wilson, DO   Patient/Family Goals Statement Home    Additional Occupational Profile Info/Pertinent History of Current Problem Per chart:  Jim Richard is a pleasant 64 year old male with history of alcoholic liver disease, ulcers, esophageal varices, CAD, hypertension, hyperlipidemia, peripheral vascular disease, tobacco use, subdural hematoma, and chronic low back pain who presented to the ED with generalized weakness. He also reported melena x 2 days that has since improved. Suspected Upper GI bleed. See chart for details.    Cognitive Status Examination   Orientation orientation to person, place and time   Level of Consciousness alert   Memory impaired   Sensory Examination   Sensory Quick Adds No deficits were identified   Pain Assessment   Patient Currently in Pain No   Range of Motion (ROM)   ROM Quick Adds No deficits were  "identified   Transfer Skills   Transfer Transfer Safety Analysis Bed/Chair;Transfer Skill: Stand to Sit;Transfer Safety Analysis Sit/Stand   Transfer Skill: Bed to Chair/Chair to Bed   Level of [place]: Bed to Chair   (Mod I )   Transfer Skill: Sit to Stand   Level of [place]: Sit/Stand   (Mod I )   Toilet Transfer   Toilet Transfer Toilet Transfer Skill;Toilet Transfer Safety Analysis   Transfer Skill: Toilet Transfer   Level of [place]: Toilet   (Mod I )   Lower Body Dressing   Level of [place]: Dress Lower Body   (Mod I )   Instrumental Activities of Daily Living (IADL)   Previous Responsibilities meal prep;housekeeping;laundry;shopping;driving;medication management;finances   General Therapy Interventions   Planned Therapy Interventions ADL retraining;IADL retraining;cognition;transfer training   Clinical Impression   Criteria for Skilled Therapeutic Interventions Met yes, treatment indicated   OT Diagnosis Decreased independence for I/ADLs   Influenced by the following impairments Decreased independence for I/ADLs   Assessment of Occupational Performance 1-3 Performance Deficits   Identified Performance Deficits Decreased independence for I/ADLs (dressing, bathing, toileting)   Clinical Decision Making (Complexity) Low complexity   Therapy Frequency Daily   Predicted Duration of Therapy Intervention (days/wks) 3 days   Anticipated Discharge Disposition Home with Home Therapy   Risks and Benefits of Treatment have been explained. Yes   Patient, Family & other staff in agreement with plan of care Yes   [facility] TM \"6 Clicks\"   2016, Trustees of [facility], under license to [employer].  All rights reserved.   6 Clicks Short Forms Daily Activity Inpatient Short Form   [facility] AM-PAC  \"6 Clicks\" Daily Activity Inpatient Short Form   1. Putting on and taking off regular lower body clothing? 3 - A Little   2. Bathing (including washing, rinsing, drying)? 3 - A " Little   3. Toileting, which includes using toilet, bedpan or urinal? 3 - A Little   4. Putting on and taking off regular upper body clothing? 4 - None   5. Taking care of personal grooming such as brushing teeth? 4 - None   6. Eating meals? 4 - None   Daily Activity Raw Score (Score out of 24.Lower scores equate to lower levels of function) 21   Total Evaluation Time   Total Evaluation Time (Minutes) 7

## 2019-10-01 NOTE — PROGRESS NOTES
Please see Care Transition SW note done on 9/26/19.    Care Coordinator has been involved, as decision now was for discharge home.  OT recommended home RN and OT.  Discussed this recommendation with pt.  He is not home bound and plans to be going to AA daily.  SW gave pt information on having a CD assessment, which pt is in agreement with.  A PCP follow up appointment has been made with his Vilma Romo provider.  Pt's roommate will be providing transportation home.

## 2019-10-01 NOTE — PROGRESS NOTES
Discharge    Patient discharged to home via car with friend    Listed belongings gathered and returned to patient. Yes  Care Plan and Patient education resolved: Yes  Prescriptions if needed, hard copies sent with patient  NA  Home and hospital acquired medications returned to patient: Yes  Medication Bin checked and emptied on discharge Yes  Follow up appointment made for patient: Yes

## 2019-10-01 NOTE — PLAN OF CARE
OT: Evaluation and treatment initiated. Pt lives in a [facility] home with a roommate. Prior pt independent in all I/ADLs.  Discharge Planner OT   Patient plan for discharge: Home today     Current status: Pt ambulated to the bathroom with SBA. Pt transferred to/from the toilet with Mod I. While seated/standing pt completed LE dressing with Mod I. Completed the MOCA Cognitive Screen. Pt scored 21/30 indicating cognitive impairment. Pt demonstrated difficulty with delayed recall, executive function, attention, and calculations. Further cognitive testing warranted.     Barriers to return to prior living situation: cognition, current level of A    Recommendations for discharge: Home with assist for medication and finance management, meal preparation, and driving. Home OT and Home RN (for medication management).   Recommend pt not drive until a driving evaluation is completed.      Rationale for recommendations: Pt will benefit from home OT to address independence and safety in I/ADLs, cognition, and for home safety evaluation.        Entered by: Mine Andrea 10/01/2019 10:12 AM          Occupational Therapy Discharge Summary    Reason for therapy discharge:    Discharged to home.    Progress towards therapy goal(s). See goals on Care Plan in [facility] electronic health record for goal details.  Goals partially met.  Barriers to achieving goals:   discharge from facility.    Therapy recommendation(s):    Home with assist for medication and finance management, meal preparation, and driving. Home OT and Home RN (for medication management).  Recommend pt not drive until a driving evaluation is completed

## 2019-10-01 NOTE — PLAN OF CARE
DATE & TIME: 10/1/2019 2177-0102  Cognitive Concerns/ Orientation : A&O x4, calm and cooperative   BEHAVIOR & AGGRESSION TOOL COLOR: Green  CIWA SCORE: 2  ABNL VS/O2: VSS on RA  MOBILITY: Independent   PAIN MANAGMENT: Denies pain  DIET: 2gm Na  BOWEL/BLADDER: Continent   ABNL LAB/BG: WDL  DRAIN/DEVICES: IV removed (discharging)  TELEMETRY RHYTHM: NSR  SKIN: Bruised, multiple skin tears to arms, open to air  TESTS/PROCEDURES: None  D/C DAY/GOALS/PLACE: Discharging home  OTHER IMPORTANT INFO: LS clear, BS active x4. Baseline tremor per pt. Nicotine patch on R deltoid in place.

## 2019-10-01 NOTE — PLAN OF CARE
DATE & TIME: 9/30/19 5552-5804  Cognitive Concerns/ Orientation : Pt A/Ox4   BEHAVIOR & AGGRESSION TOOL COLOR: Green  CIWA SCORE: 2/2/2, tremor   ABNL VS/O2: VSS on RA  MOBILITY: Independent  PAIN MANAGMENT: Denies  DIET: 2g Na  BOWEL/BLADDER: Continent  ABNL LAB/BG: Bili 1.9/ Hgb 10.2  DRAIN/DEVICES: IV SL  TELEMETRY RHYTHM: SR with BBB  SKIN: Bruising, skin tears and abrasions on bilateral arms. Trace edema on bilateral lower extremities  D/C DAY/GOALS/PLACE: Discharge 1-2 days pending progress  OTHER IMPORTANT INFO: Baseline tremor. Infrequent, dry nonproductive cough. Abdomen distended, receiving lactulose. Pt walking around unit this evening.

## 2019-10-22 ENCOUNTER — HOSPITAL ENCOUNTER (INPATIENT)
Facility: CLINIC | Age: 64
LOS: 8 days | Discharge: HOME | DRG: 897 | End: 2019-10-30
Attending: EMERGENCY MEDICINE | Admitting: STUDENT IN AN ORGANIZED HEALTH CARE EDUCATION/TRAINING PROGRAM
Payer: MEDICARE

## 2019-10-22 DIAGNOSIS — F10.939 ALCOHOL WITHDRAWAL SYNDROME WITH COMPLICATION (H): ICD-10-CM

## 2019-10-22 DIAGNOSIS — T50.902A INTENTIONAL DRUG OVERDOSE, INITIAL ENCOUNTER (H): ICD-10-CM

## 2019-10-22 DIAGNOSIS — F10.930 ALCOHOL WITHDRAWAL SYNDROME WITHOUT COMPLICATION (H): Primary | ICD-10-CM

## 2019-10-22 DIAGNOSIS — K70.31 ALCOHOLIC CIRRHOSIS OF LIVER WITH ASCITES (H): ICD-10-CM

## 2019-10-22 DIAGNOSIS — F10.920 ALCOHOLIC INTOXICATION WITHOUT COMPLICATION (H): ICD-10-CM

## 2019-10-22 DIAGNOSIS — K70.10 ALCOHOLIC HEPATITIS WITHOUT ASCITES (H): ICD-10-CM

## 2019-10-22 DIAGNOSIS — F33.2 SEVERE RECURRENT MAJOR DEPRESSION WITHOUT PSYCHOTIC FEATURES (H): ICD-10-CM

## 2019-10-22 DIAGNOSIS — R45.851 SUICIDAL IDEATION: ICD-10-CM

## 2019-10-22 PROBLEM — D62 ACUTE BLOOD LOSS ANEMIA: Status: RESOLVED | Noted: 2018-09-29 | Resolved: 2019-10-22

## 2019-10-22 PROBLEM — K92.0 HEMATEMESIS: Status: RESOLVED | Noted: 2018-04-26 | Resolved: 2019-10-22

## 2019-10-22 PROBLEM — Z09 S/P UMBILICAL HERNIA REPAIR, FOLLOW-UP EXAM: Status: ACTIVE | Noted: 2017-05-18

## 2019-10-22 PROBLEM — G89.29 CHRONIC BACK PAIN GREATER THAN 3 MONTHS DURATION: Status: ACTIVE | Noted: 2019-02-14

## 2019-10-22 PROBLEM — D50.9 ANEMIA, IRON DEFICIENCY: Status: ACTIVE | Noted: 2019-02-26

## 2019-10-22 PROBLEM — K92.2 GI BLEED: Status: RESOLVED | Noted: 2018-03-21 | Resolved: 2019-10-22

## 2019-10-22 PROBLEM — M54.9 CHRONIC BACK PAIN GREATER THAN 3 MONTHS DURATION: Status: ACTIVE | Noted: 2019-02-14

## 2019-10-22 LAB
ALBUMIN SERPL-MCNC: 2.7 G/DL (ref 3.4–5)
ALP SERPL-CCNC: 303 U/L (ref 40–150)
ALT SERPL W P-5'-P-CCNC: 41 U/L (ref 0–70)
AMPHETAMINES UR QL SCN: NEGATIVE
ANION GAP SERPL CALCULATED.3IONS-SCNC: 10 MMOL/L (ref 3–14)
ANION GAP SERPL CALCULATED.3IONS-SCNC: 8 MMOL/L (ref 3–14)
ANION GAP SERPL CALCULATED.3IONS-SCNC: 8 MMOL/L (ref 3–14)
APAP SERPL-MCNC: <2 MG/L (ref 10–20)
AST SERPL W P-5'-P-CCNC: 116 U/L (ref 0–45)
BARBITURATES UR QL: NEGATIVE
BASOPHILS # BLD AUTO: 0.1 10E9/L (ref 0–0.2)
BASOPHILS NFR BLD AUTO: 0.9 %
BENZODIAZ UR QL: NEGATIVE
BILIRUB SERPL-MCNC: 1.2 MG/DL (ref 0.2–1.3)
BUN SERPL-MCNC: 18 MG/DL (ref 7–30)
CALCIUM SERPL-MCNC: 7.4 MG/DL (ref 8.5–10.1)
CALCIUM SERPL-MCNC: 7.5 MG/DL (ref 8.5–10.1)
CALCIUM SERPL-MCNC: 7.8 MG/DL (ref 8.5–10.1)
CANNABINOIDS UR QL SCN: NEGATIVE
CHLORIDE SERPL-SCNC: 107 MMOL/L (ref 94–109)
CHLORIDE SERPL-SCNC: 107 MMOL/L (ref 94–109)
CHLORIDE SERPL-SCNC: 108 MMOL/L (ref 94–109)
CO2 SERPL-SCNC: 22 MMOL/L (ref 20–32)
CO2 SERPL-SCNC: 23 MMOL/L (ref 20–32)
CO2 SERPL-SCNC: 24 MMOL/L (ref 20–32)
COCAINE UR QL: NEGATIVE
CREAT SERPL-MCNC: 0.86 MG/DL (ref 0.66–1.25)
CREAT SERPL-MCNC: 0.86 MG/DL (ref 0.66–1.25)
CREAT SERPL-MCNC: 0.88 MG/DL (ref 0.66–1.25)
DIFFERENTIAL METHOD BLD: ABNORMAL
EOSINOPHIL # BLD AUTO: 0.1 10E9/L (ref 0–0.7)
EOSINOPHIL NFR BLD AUTO: 0.9 %
ERYTHROCYTE [DISTWIDTH] IN BLOOD BY AUTOMATED COUNT: 15.4 % (ref 10–15)
ETHANOL SERPL-MCNC: 0.4 G/DL
GFR SERPL CREATININE-BSD FRML MDRD: 90 ML/MIN/{1.73_M2}
GFR SERPL CREATININE-BSD FRML MDRD: >90 ML/MIN/{1.73_M2}
GFR SERPL CREATININE-BSD FRML MDRD: >90 ML/MIN/{1.73_M2}
GLUCOSE SERPL-MCNC: 101 MG/DL (ref 70–99)
GLUCOSE SERPL-MCNC: 72 MG/DL (ref 70–99)
GLUCOSE SERPL-MCNC: 87 MG/DL (ref 70–99)
HCT VFR BLD AUTO: 30.6 % (ref 40–53)
HGB BLD-MCNC: 10.1 G/DL (ref 13.3–17.7)
IMM GRANULOCYTES # BLD: 0 10E9/L (ref 0–0.4)
IMM GRANULOCYTES NFR BLD: 0.2 %
INR PPP: 1.13 (ref 0.86–1.14)
LYMPHOCYTES # BLD AUTO: 0.9 10E9/L (ref 0.8–5.3)
LYMPHOCYTES NFR BLD AUTO: 16.4 %
MCH RBC QN AUTO: 32.3 PG (ref 26.5–33)
MCHC RBC AUTO-ENTMCNC: 33 G/DL (ref 31.5–36.5)
MCV RBC AUTO: 98 FL (ref 78–100)
MONOCYTES # BLD AUTO: 0.4 10E9/L (ref 0–1.3)
MONOCYTES NFR BLD AUTO: 7.6 %
NEUTROPHILS # BLD AUTO: 4.1 10E9/L (ref 1.6–8.3)
NEUTROPHILS NFR BLD AUTO: 74 %
NRBC # BLD AUTO: 0 10*3/UL
NRBC BLD AUTO-RTO: 0 /100
OPIATES UR QL SCN: NEGATIVE
PCP UR QL SCN: NEGATIVE
PLATELET # BLD AUTO: 138 10E9/L (ref 150–450)
POTASSIUM SERPL-SCNC: 3.6 MMOL/L (ref 3.4–5.3)
POTASSIUM SERPL-SCNC: 3.7 MMOL/L (ref 3.4–5.3)
POTASSIUM SERPL-SCNC: 3.7 MMOL/L (ref 3.4–5.3)
PROT SERPL-MCNC: 7 G/DL (ref 6.8–8.8)
RBC # BLD AUTO: 3.13 10E12/L (ref 4.4–5.9)
SALICYLATES SERPL-MCNC: <2 MG/DL
SODIUM SERPL-SCNC: 139 MMOL/L (ref 133–144)
WBC # BLD AUTO: 5.6 10E9/L (ref 4–11)

## 2019-10-22 PROCEDURE — 99223 1ST HOSP IP/OBS HIGH 75: CPT | Mod: AI | Performed by: STUDENT IN AN ORGANIZED HEALTH CARE EDUCATION/TRAINING PROGRAM

## 2019-10-22 PROCEDURE — 80048 BASIC METABOLIC PNL TOTAL CA: CPT | Performed by: STUDENT IN AN ORGANIZED HEALTH CARE EDUCATION/TRAINING PROGRAM

## 2019-10-22 PROCEDURE — 80053 COMPREHEN METABOLIC PANEL: CPT | Performed by: EMERGENCY MEDICINE

## 2019-10-22 PROCEDURE — 25000128 H RX IP 250 OP 636: Performed by: EMERGENCY MEDICINE

## 2019-10-22 PROCEDURE — 93005 ELECTROCARDIOGRAM TRACING: CPT

## 2019-10-22 PROCEDURE — 80329 ANALGESICS NON-OPIOID 1 OR 2: CPT | Performed by: EMERGENCY MEDICINE

## 2019-10-22 PROCEDURE — 80320 DRUG SCREEN QUANTALCOHOLS: CPT | Performed by: EMERGENCY MEDICINE

## 2019-10-22 PROCEDURE — 85610 PROTHROMBIN TIME: CPT | Performed by: EMERGENCY MEDICINE

## 2019-10-22 PROCEDURE — 12000000 ZZH R&B MED SURG/OB

## 2019-10-22 PROCEDURE — 25000132 ZZH RX MED GY IP 250 OP 250 PS 637: Mod: GY | Performed by: EMERGENCY MEDICINE

## 2019-10-22 PROCEDURE — 36415 COLL VENOUS BLD VENIPUNCTURE: CPT | Performed by: STUDENT IN AN ORGANIZED HEALTH CARE EDUCATION/TRAINING PROGRAM

## 2019-10-22 PROCEDURE — HZ2ZZZZ DETOXIFICATION SERVICES FOR SUBSTANCE ABUSE TREATMENT: ICD-10-PCS | Performed by: STUDENT IN AN ORGANIZED HEALTH CARE EDUCATION/TRAINING PROGRAM

## 2019-10-22 PROCEDURE — 99285 EMERGENCY DEPT VISIT HI MDM: CPT

## 2019-10-22 PROCEDURE — 80048 BASIC METABOLIC PNL TOTAL CA: CPT | Performed by: HOSPITALIST

## 2019-10-22 PROCEDURE — 25000132 ZZH RX MED GY IP 250 OP 250 PS 637: Mod: GY | Performed by: STUDENT IN AN ORGANIZED HEALTH CARE EDUCATION/TRAINING PROGRAM

## 2019-10-22 PROCEDURE — 80307 DRUG TEST PRSMV CHEM ANLYZR: CPT | Performed by: EMERGENCY MEDICINE

## 2019-10-22 PROCEDURE — 85025 COMPLETE CBC W/AUTO DIFF WBC: CPT | Performed by: EMERGENCY MEDICINE

## 2019-10-22 PROCEDURE — 36415 COLL VENOUS BLD VENIPUNCTURE: CPT | Performed by: HOSPITALIST

## 2019-10-22 PROCEDURE — 80076 HEPATIC FUNCTION PANEL: CPT | Performed by: HOSPITALIST

## 2019-10-22 PROCEDURE — 25800030 ZZH RX IP 258 OP 636: Performed by: STUDENT IN AN ORGANIZED HEALTH CARE EDUCATION/TRAINING PROGRAM

## 2019-10-22 RX ORDER — LIDOCAINE 40 MG/G
CREAM TOPICAL
Status: DISCONTINUED | OUTPATIENT
Start: 2019-10-22 | End: 2019-10-30 | Stop reason: HOSPADM

## 2019-10-22 RX ORDER — QUETIAPINE FUMARATE 25 MG/1
25-50 TABLET, FILM COATED ORAL EVERY 6 HOURS PRN
Status: DISCONTINUED | OUTPATIENT
Start: 2019-10-22 | End: 2019-10-30 | Stop reason: HOSPADM

## 2019-10-22 RX ORDER — LORAZEPAM 1 MG/1
1 TABLET ORAL ONCE
Status: COMPLETED | OUTPATIENT
Start: 2019-10-22 | End: 2019-10-22

## 2019-10-22 RX ORDER — ONDANSETRON 4 MG/1
4 TABLET, ORALLY DISINTEGRATING ORAL EVERY 6 HOURS PRN
Status: DISCONTINUED | OUTPATIENT
Start: 2019-10-22 | End: 2019-10-30 | Stop reason: HOSPADM

## 2019-10-22 RX ORDER — ONDANSETRON 2 MG/ML
4 INJECTION INTRAMUSCULAR; INTRAVENOUS EVERY 6 HOURS PRN
Status: DISCONTINUED | OUTPATIENT
Start: 2019-10-22 | End: 2019-10-30 | Stop reason: HOSPADM

## 2019-10-22 RX ORDER — SODIUM CHLORIDE, SODIUM LACTATE, POTASSIUM CHLORIDE, CALCIUM CHLORIDE 600; 310; 30; 20 MG/100ML; MG/100ML; MG/100ML; MG/100ML
INJECTION, SOLUTION INTRAVENOUS CONTINUOUS
Status: DISCONTINUED | OUTPATIENT
Start: 2019-10-22 | End: 2019-10-24

## 2019-10-22 RX ORDER — NICOTINE 21 MG/24HR
1 PATCH, TRANSDERMAL 24 HOURS TRANSDERMAL DAILY PRN
Status: DISCONTINUED | OUTPATIENT
Start: 2019-10-22 | End: 2019-10-30 | Stop reason: HOSPADM

## 2019-10-22 RX ORDER — LORAZEPAM 1 MG/1
1-2 TABLET ORAL EVERY 30 MIN PRN
Status: DISCONTINUED | OUTPATIENT
Start: 2019-10-22 | End: 2019-10-30 | Stop reason: HOSPADM

## 2019-10-22 RX ORDER — PANTOPRAZOLE SODIUM 40 MG/1
40 TABLET, DELAYED RELEASE ORAL
Status: DISCONTINUED | OUTPATIENT
Start: 2019-10-23 | End: 2019-10-30 | Stop reason: HOSPADM

## 2019-10-22 RX ORDER — LORAZEPAM 2 MG/ML
1-2 INJECTION INTRAMUSCULAR EVERY 30 MIN PRN
Status: DISCONTINUED | OUTPATIENT
Start: 2019-10-22 | End: 2019-10-30 | Stop reason: HOSPADM

## 2019-10-22 RX ORDER — NALOXONE HYDROCHLORIDE 0.4 MG/ML
.1-.4 INJECTION, SOLUTION INTRAMUSCULAR; INTRAVENOUS; SUBCUTANEOUS
Status: DISCONTINUED | OUTPATIENT
Start: 2019-10-22 | End: 2019-10-30 | Stop reason: HOSPADM

## 2019-10-22 RX ADMIN — LORAZEPAM 1 MG: 1 TABLET ORAL at 18:20

## 2019-10-22 RX ADMIN — NICOTINE 1 PATCH: 21 PATCH, EXTENDED RELEASE TRANSDERMAL at 21:32

## 2019-10-22 RX ADMIN — LORAZEPAM 1 MG: 1 TABLET ORAL at 22:30

## 2019-10-22 RX ADMIN — QUETIAPINE 25 MG: 25 TABLET ORAL at 22:30

## 2019-10-22 RX ADMIN — LORAZEPAM 1 MG: 1 TABLET ORAL at 20:23

## 2019-10-22 RX ADMIN — SODIUM CHLORIDE, POTASSIUM CHLORIDE, SODIUM LACTATE AND CALCIUM CHLORIDE: 600; 310; 30; 20 INJECTION, SOLUTION INTRAVENOUS at 20:23

## 2019-10-22 RX ADMIN — SODIUM CHLORIDE 1000 ML: 9 INJECTION, SOLUTION INTRAVENOUS at 17:45

## 2019-10-22 ASSESSMENT — ENCOUNTER SYMPTOMS
AGITATION: 1
VOMITING: 0

## 2019-10-22 ASSESSMENT — ACTIVITIES OF DAILY LIVING (ADL)
ADLS_ACUITY_SCORE: 12
WHICH_OF_THE_ABOVE_FUNCTIONAL_RISKS_HAD_A_RECENT_ONSET_OR_CHANGE?: FALL HISTORY
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
## 2019-10-22 NOTE — PROGRESS NOTES
RECEIVING UNIT ED HANDOFF REVIEW    ED Nurse Handoff Report was reviewed by: Cristina Downey RN on October 22, 2019 at 6:07 PM
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
## 2019-10-22 NOTE — ED NOTES
Bed: [facility]  Expected date:   Expected time:   Means of arrival:   Comments:  443  64 M overdose/suicide attempt/hold  1523

## 2019-10-22 NOTE — ED PROVIDER NOTES
"  History     Chief Complaint:  Ingestion and Suicidal    HPI   Jim Richard is a 64 year old male with history of substance abuse, depression, anxiety, esophageal varices with bleeding, who presents with intentional ingestion and suicidal ideation. Per RN report, the patient voiced that he wants to die. He reported that he took his anti-anxiety meds, blood pressure meds, and drank alcohol. He also was \"tapped\" about 3 weeks ago and he feels that his abdomen is very distended. Here, the patient endorses that he took 2 x hydrochlorothiazide, 2 x spironolactone, and last drank this morning. He voices that he drinks \"7.5\" oz/drinks a day. He has been through withdrawal before with no prior seizures, and he is not withdrawing here, per his report. He denies any vomiting or any other drug ingestion. He endorses baseline leg swelling, so this is not new today.     Allergies:  Amlodipine  Lisinopril     Medications:    Albuterol  Duloxetine  Breo ellipta  Lasix  Mirtazapine  Pantoprazole  Propranolol  Seroquel  Spironolactone  Flomax  Hydroxyzine pamoate  Acamprosate  Atorvastatin  Omeprazole  Trazodone  Amlodipine  Gabapentin  Venlafaxine  Lisinopril    Past Medical History:    Alcoholism  Anxiety  CAD  Depression  Esophageal varices with bleeding  Heart attack  Hepatomegaly  Hyperlipidemia  Hypertension  JANIS on CPAP  Peripheral vascula disease  PVD  SDH  Spinal stenosis  Sleep apnea  Abnormal LFTs  Chronic pain syndrome    Past Surgical History:    Appendectomy  Bypass graft femoropopliteal  Endarterectomy femoral  Abdominal hernia repair  Laminectomy, fusion lumbar 3+ level, combined  Tonsillectomy and adenoidectomy    Family History:    Mental illness  CAD  Substance abuse  Lung cancer    Social History:  Smoking status: Current every day smoker  Alcohol use: Yes  Drug use: No  PCP: FERNANDA BRANTLEY  Presents to the ED with himself via EMS  Marital Status:        Review of Systems   Gastrointestinal: " Negative for vomiting.   Psychiatric/Behavioral: Positive for agitation and suicidal ideas.   All other systems reviewed and are negative.    Physical Exam     Patient Vitals for the past 24 hrs:   BP Temp Temp src Pulse Heart Rate Resp SpO2   10/22/19 1600 130/67 -- -- 73 73 23 --   10/22/19 1550 (!) 145/83 -- -- -- 60 18 95 %   10/22/19 1543 (!) 145/83 97  F (36.1  C) Oral -- 79 12 95 %       Physical Exam  General: Lying on bed, appears intoxicated. Disheveled  Eyes:  The pupils are equal and round    Conjunctivae and sclerae are normal  ENT:    Moist mucous membranes  Neck:  Normal range of motion  CV:  Regular rate and rhythm    Skin warm and well perfused   Resp:  Lungs are clear    Non-labored    No rales    No wheezing   GI:  Abdomen is soft, there is no rigidity    Abdominal distention    No rebound tenderness     No abdominal tenderness  MS:  Bilateral LE edema  Skin:  No rash or acute skin lesions noted  Neuro:   Awake, alert.      Speech is slurred    Face is symmetric.     Moves all extremities equally  Psych: Normal affect.  Appropriate interactions.    Emergency Department Course   ECG (15:41:22):  Rate 80 bpm. OK interval 146. QRS duration 132. QT/QTc 442/509. P-R-T axes 60 -33 37. Normal sinus rhythm. Left axis deviation. Right bundle branch block. Abnormal EKG. Agree with computer interpretation. No significant change when compared to EKG dated 9/25/19. Interpreted at 1650 by Chastity Frias MD.    Laboratory:  CBC: HGB 10.1 (L),  (L) o/w WNL (WBC 5.6)  CMP: Calcium 7.8 (L), Albumin 2.7 (L), Alkphos 303 (H), Ast 116 (H) o/w WNL (Creatinine 0.86)  Alcohol ethyl: 0.4 (H)  Acetaminophen level: <2  INR: 1.13  Salicylate level: <2    Interventions:  1745: NS 1L IV Bolus  1820: 1 mg Ativan PO    Emergency Department Course:  The patient arrived in the emergency department via EMS.    Past medical records, nursing notes, and vitals reviewed.  1620: I performed an exam of the patient and  obtained history, as documented above.    1650: I spoke with Poison Control. They suggested watching the patient for 8 hours.     1719: I rechecked the patient. Explained findings to patient.    Findings and plan explained to the patient who consents to admission.     1738: Discussed the patient with Dr. Murcia, who will admit the patient to a [facility] bed for further monitoring, evaluation, and treatment.    Impression & Plan    Medical Decision Making:  Jim Richard is a 64 year old male who presented to the ED with ingestion and suicidal ideation.  Conflicting information on which medications he actually took today.  Doubt that he took propranolol given his vital signs.  Has chronic prolongation of QRS and QTc based on prior EKGs.  Patient is intoxicated. Spoke to poison center who recommended monitoring for 8 hours given the duloxetine has longest peak onset. Given his level of intoxication, need for monitoring for extended period of time, will admit to hospitalist. Will likely start withdrawing from alcohol and needs psych assessment once sober. Is distended and likely needs repeat paracentesis thought not emergent at this time and doubt SBP.    Diagnosis:    ICD-10-CM    1. Suicidal ideation R45.851 Salicylate level     Salicylate level     CANCELED: Salicylate level   2. Alcoholic intoxication without complication (H) F10.920    3. Intentional drug overdose, initial encounter (H) T50.902A        Disposition: Admitted to [facility].    I, Coby Teran, am serving as a scribe at 4:20 PM on 10/22/2019 to document services personally performed by Chastity Frias MD based on my observations and the provider's statements to me.     Coby Teran  10/22/2019    EMERGENCY DEPARTMENT       Chastity Frias MD  10/22/19 2777

## 2019-10-22 NOTE — ED NOTES
DATE:  10/22/2019   TIME OF RECEIPT FROM LAB:  4758  LAB TEST:  alcohol  LAB VALUE:  0.4  RESULTS GIVEN WITH READ-BACK TO (PROVIDER): Dr. Frias  TIME LAB VALUE REPORTED TO PROVIDER:   9330

## 2019-10-23 ENCOUNTER — APPOINTMENT (OUTPATIENT)
Dept: ULTRASOUND IMAGING | Facility: CLINIC | Age: 64
DRG: 897 | End: 2019-10-23
Attending: STUDENT IN AN ORGANIZED HEALTH CARE EDUCATION/TRAINING PROGRAM
Payer: MEDICARE

## 2019-10-23 LAB
ALBUMIN SERPL-MCNC: 2.5 G/DL (ref 3.4–5)
ALBUMIN SERPL-MCNC: 2.6 G/DL (ref 3.4–5)
ALP SERPL-CCNC: 266 U/L (ref 40–150)
ALP SERPL-CCNC: 276 U/L (ref 40–150)
ALT SERPL W P-5'-P-CCNC: 32 U/L (ref 0–70)
ALT SERPL W P-5'-P-CCNC: 37 U/L (ref 0–70)
ANION GAP SERPL CALCULATED.3IONS-SCNC: 7 MMOL/L (ref 3–14)
AST SERPL W P-5'-P-CCNC: 107 U/L (ref 0–45)
AST SERPL W P-5'-P-CCNC: 90 U/L (ref 0–45)
BILIRUB DIRECT SERPL-MCNC: 0.7 MG/DL (ref 0–0.2)
BILIRUB DIRECT SERPL-MCNC: 1 MG/DL (ref 0–0.2)
BILIRUB SERPL-MCNC: 1.2 MG/DL (ref 0.2–1.3)
BILIRUB SERPL-MCNC: 1.9 MG/DL (ref 0.2–1.3)
BUN SERPL-MCNC: 17 MG/DL (ref 7–30)
CALCIUM SERPL-MCNC: 7.9 MG/DL (ref 8.5–10.1)
CHLORIDE SERPL-SCNC: 107 MMOL/L (ref 94–109)
CO2 SERPL-SCNC: 23 MMOL/L (ref 20–32)
CREAT SERPL-MCNC: 0.8 MG/DL (ref 0.66–1.25)
ERYTHROCYTE [DISTWIDTH] IN BLOOD BY AUTOMATED COUNT: 15.5 % (ref 10–15)
ERYTHROCYTE [DISTWIDTH] IN BLOOD BY AUTOMATED COUNT: NORMAL % (ref 10–15)
GFR SERPL CREATININE-BSD FRML MDRD: >90 ML/MIN/{1.73_M2}
GLUCOSE SERPL-MCNC: 83 MG/DL (ref 70–99)
HCT VFR BLD AUTO: 24.5 % (ref 40–53)
HCT VFR BLD AUTO: NORMAL % (ref 40–53)
HGB BLD-MCNC: 8.2 G/DL (ref 13.3–17.7)
HGB BLD-MCNC: NORMAL G/DL (ref 13.3–17.7)
INTERPRETATION ECG - MUSE: NORMAL
LACTATE BLD-SCNC: 2.1 MMOL/L (ref 0.7–2)
MCH RBC QN AUTO: 32.8 PG (ref 26.5–33)
MCH RBC QN AUTO: NORMAL PG (ref 26.5–33)
MCHC RBC AUTO-ENTMCNC: 33.5 G/DL (ref 31.5–36.5)
MCHC RBC AUTO-ENTMCNC: NORMAL G/DL (ref 31.5–36.5)
MCV RBC AUTO: 98 FL (ref 78–100)
MCV RBC AUTO: NORMAL FL (ref 78–100)
PLATELET # BLD AUTO: 62 10E9/L (ref 150–450)
PLATELET # BLD AUTO: NORMAL 10E9/L (ref 150–450)
POTASSIUM SERPL-SCNC: 3.6 MMOL/L (ref 3.4–5.3)
PROT SERPL-MCNC: 6.4 G/DL (ref 6.8–8.8)
PROT SERPL-MCNC: 6.5 G/DL (ref 6.8–8.8)
RBC # BLD AUTO: 2.5 10E12/L (ref 4.4–5.9)
RBC # BLD AUTO: NORMAL 10E12/L (ref 4.4–5.9)
SODIUM SERPL-SCNC: 137 MMOL/L (ref 133–144)
WBC # BLD AUTO: 3.1 10E9/L (ref 4–11)
WBC # BLD AUTO: NORMAL 10E9/L (ref 4–11)

## 2019-10-23 PROCEDURE — 25800030 ZZH RX IP 258 OP 636: Performed by: STUDENT IN AN ORGANIZED HEALTH CARE EDUCATION/TRAINING PROGRAM

## 2019-10-23 PROCEDURE — 80048 BASIC METABOLIC PNL TOTAL CA: CPT | Performed by: STUDENT IN AN ORGANIZED HEALTH CARE EDUCATION/TRAINING PROGRAM

## 2019-10-23 PROCEDURE — 85027 COMPLETE CBC AUTOMATED: CPT | Performed by: STUDENT IN AN ORGANIZED HEALTH CARE EDUCATION/TRAINING PROGRAM

## 2019-10-23 PROCEDURE — 0W9G3ZZ DRAINAGE OF PERITONEAL CAVITY, PERCUTANEOUS APPROACH: ICD-10-PCS | Performed by: RADIOLOGY

## 2019-10-23 PROCEDURE — 80076 HEPATIC FUNCTION PANEL: CPT | Performed by: STUDENT IN AN ORGANIZED HEALTH CARE EDUCATION/TRAINING PROGRAM

## 2019-10-23 PROCEDURE — 36415 COLL VENOUS BLD VENIPUNCTURE: CPT | Performed by: INTERNAL MEDICINE

## 2019-10-23 PROCEDURE — 25000132 ZZH RX MED GY IP 250 OP 250 PS 637: Mod: GY | Performed by: INTERNAL MEDICINE

## 2019-10-23 PROCEDURE — 25000125 ZZHC RX 250: Performed by: RADIOLOGY

## 2019-10-23 PROCEDURE — 40000863 ZZH STATISTIC RADIOLOGY XRAY, US, CT, MAR, NM

## 2019-10-23 PROCEDURE — 27210190 US PARACENTESIS

## 2019-10-23 PROCEDURE — 83605 ASSAY OF LACTIC ACID: CPT | Performed by: INTERNAL MEDICINE

## 2019-10-23 PROCEDURE — 25000132 ZZH RX MED GY IP 250 OP 250 PS 637: Mod: GY | Performed by: STUDENT IN AN ORGANIZED HEALTH CARE EDUCATION/TRAINING PROGRAM

## 2019-10-23 PROCEDURE — 99232 SBSQ HOSP IP/OBS MODERATE 35: CPT | Performed by: INTERNAL MEDICINE

## 2019-10-23 PROCEDURE — 25000132 ZZH RX MED GY IP 250 OP 250 PS 637: Mod: GY | Performed by: PSYCHIATRY & NEUROLOGY

## 2019-10-23 PROCEDURE — 99222 1ST HOSP IP/OBS MODERATE 55: CPT | Performed by: PSYCHIATRY & NEUROLOGY

## 2019-10-23 PROCEDURE — 12000000 ZZH R&B MED SURG/OB

## 2019-10-23 PROCEDURE — 36415 COLL VENOUS BLD VENIPUNCTURE: CPT | Performed by: STUDENT IN AN ORGANIZED HEALTH CARE EDUCATION/TRAINING PROGRAM

## 2019-10-23 PROCEDURE — 25000128 H RX IP 250 OP 636: Performed by: STUDENT IN AN ORGANIZED HEALTH CARE EDUCATION/TRAINING PROGRAM

## 2019-10-23 RX ORDER — DULOXETIN HYDROCHLORIDE 60 MG/1
60 CAPSULE, DELAYED RELEASE ORAL DAILY
Status: DISCONTINUED | OUTPATIENT
Start: 2019-10-23 | End: 2019-10-30 | Stop reason: HOSPADM

## 2019-10-23 RX ORDER — TRAZODONE HYDROCHLORIDE 100 MG/1
100 TABLET ORAL AT BEDTIME
Status: DISCONTINUED | OUTPATIENT
Start: 2019-10-23 | End: 2019-10-30 | Stop reason: HOSPADM

## 2019-10-23 RX ORDER — MULTIPLE VITAMINS W/ MINERALS TAB 9MG-400MCG
1 TAB ORAL DAILY
Status: DISCONTINUED | OUTPATIENT
Start: 2019-10-23 | End: 2019-10-23

## 2019-10-23 RX ORDER — MIRTAZAPINE 15 MG/1
15 TABLET, FILM COATED ORAL AT BEDTIME
Status: DISCONTINUED | OUTPATIENT
Start: 2019-10-23 | End: 2019-10-30 | Stop reason: HOSPADM

## 2019-10-23 RX ORDER — MULTIPLE VITAMINS W/ MINERALS TAB 9MG-400MCG
1 TAB ORAL DAILY
Status: DISCONTINUED | OUTPATIENT
Start: 2019-10-23 | End: 2019-10-30 | Stop reason: HOSPADM

## 2019-10-23 RX ORDER — LIDOCAINE HYDROCHLORIDE 10 MG/ML
10 INJECTION, SOLUTION EPIDURAL; INFILTRATION; INTRACAUDAL; PERINEURAL ONCE
Status: COMPLETED | OUTPATIENT
Start: 2019-10-23 | End: 2019-10-23

## 2019-10-23 RX ORDER — TRAZODONE HYDROCHLORIDE 100 MG/1
200 TABLET ORAL AT BEDTIME
Status: ON HOLD | COMMUNITY
End: 2019-10-30

## 2019-10-23 RX ORDER — QUETIAPINE FUMARATE 50 MG/1
50 TABLET, FILM COATED ORAL AT BEDTIME
Status: DISCONTINUED | OUTPATIENT
Start: 2019-10-23 | End: 2019-10-30 | Stop reason: HOSPADM

## 2019-10-23 RX ORDER — DEXTROSE MONOHYDRATE 25 G/50ML
25-50 INJECTION, SOLUTION INTRAVENOUS
Status: DISCONTINUED | OUTPATIENT
Start: 2019-10-23 | End: 2019-10-28

## 2019-10-23 RX ORDER — NICOTINE POLACRILEX 4 MG
15-30 LOZENGE BUCCAL
Status: DISCONTINUED | OUTPATIENT
Start: 2019-10-23 | End: 2019-10-28

## 2019-10-23 RX ADMIN — LORAZEPAM 1 MG: 1 TABLET ORAL at 11:28

## 2019-10-23 RX ADMIN — FLUTICASONE FUROATE AND VILANTEROL TRIFENATATE 1 PUFF: 200; 25 POWDER RESPIRATORY (INHALATION) at 09:44

## 2019-10-23 RX ADMIN — PANTOPRAZOLE SODIUM 40 MG: 40 TABLET, DELAYED RELEASE ORAL at 06:49

## 2019-10-23 RX ADMIN — SODIUM CHLORIDE, POTASSIUM CHLORIDE, SODIUM LACTATE AND CALCIUM CHLORIDE: 600; 310; 30; 20 INJECTION, SOLUTION INTRAVENOUS at 06:50

## 2019-10-23 RX ADMIN — LORAZEPAM 1 MG: 1 TABLET ORAL at 09:53

## 2019-10-23 RX ADMIN — SODIUM CHLORIDE, POTASSIUM CHLORIDE, SODIUM LACTATE AND CALCIUM CHLORIDE: 600; 310; 30; 20 INJECTION, SOLUTION INTRAVENOUS at 15:46

## 2019-10-23 RX ADMIN — MIRTAZAPINE 15 MG: 15 TABLET, FILM COATED ORAL at 20:15

## 2019-10-23 RX ADMIN — TRAZODONE HYDROCHLORIDE 100 MG: 100 TABLET ORAL at 20:16

## 2019-10-23 RX ADMIN — LORAZEPAM 1 MG: 1 TABLET ORAL at 14:29

## 2019-10-23 RX ADMIN — LORAZEPAM 2 MG: 2 INJECTION, SOLUTION INTRAMUSCULAR; INTRAVENOUS at 20:34

## 2019-10-23 RX ADMIN — LIDOCAINE HYDROCHLORIDE 10 ML: 10 INJECTION, SOLUTION EPIDURAL; INFILTRATION; INTRACAUDAL; PERINEURAL at 08:25

## 2019-10-23 RX ADMIN — ONDANSETRON 4 MG: 2 INJECTION INTRAMUSCULAR; INTRAVENOUS at 05:24

## 2019-10-23 RX ADMIN — QUETIAPINE 50 MG: 25 TABLET ORAL at 04:38

## 2019-10-23 RX ADMIN — QUETIAPINE FUMARATE 50 MG: 50 TABLET ORAL at 20:16

## 2019-10-23 RX ADMIN — LORAZEPAM 1 MG: 1 TABLET ORAL at 15:45

## 2019-10-23 RX ADMIN — LORAZEPAM 2 MG: 1 TABLET ORAL at 22:01

## 2019-10-23 RX ADMIN — LORAZEPAM 2 MG: 2 INJECTION, SOLUTION INTRAMUSCULAR; INTRAVENOUS at 19:15

## 2019-10-23 RX ADMIN — MULTIPLE VITAMINS W/ MINERALS TAB 1 TABLET: TAB at 14:29

## 2019-10-23 RX ADMIN — DULOXETINE HYDROCHLORIDE 60 MG: 30 CAPSULE, DELAYED RELEASE ORAL at 09:42

## 2019-10-23 ASSESSMENT — ACTIVITIES OF DAILY LIVING (ADL)
ADLS_ACUITY_SCORE: 13
ADLS_ACUITY_SCORE: 13
ADLS_ACUITY_SCORE: 14
ADLS_ACUITY_SCORE: 14
ADLS_ACUITY_SCORE: 13
ADLS_ACUITY_SCORE: 14

## 2019-10-23 NOTE — PROVIDER NOTIFICATION
MD Notification    Notified Person: MD    Notified Person Name: admitting provider: Divina    Notification Date/Time: 10/22/19 1949    Notification Interaction: phone    Purpose of Notification: FYI: Repeat metabolic panel in a couple hours per poison control     Orders Received: no call back     Comments: I placed an order for MD sign.

## 2019-10-23 NOTE — PROGRESS NOTES
Current condition (current mood & behavior): in bed, calm, cooperative  Sitter present: yes  Every 15 minute documentation by NA/RN completed for Shift: yes  Room safety Suicide Checklist completed in Epic: yes  Order for psych consult placed (if appropriate): yes  Suicide care plan added: yes      Martina Mock RN

## 2019-10-23 NOTE — PROGRESS NOTES
Multiple lab tests have been ordered overnight at the request of Poison Control for monitoring of electrolytes and liver function in the setting of his ingestion.

## 2019-10-23 NOTE — PROVIDER NOTIFICATION
MD Notification    Notified Person: MD    Notified Person Name: Dr Lewis    Notification Date/Time:10/23/19 @1043    Notification Interaction: web paged    Purpose of Notification:lactic 2.1    Orders Received:not a this time  Comments:

## 2019-10-23 NOTE — PROGRESS NOTES
[facility]    Medicine Progress Note - Hospitalist Service       Date of Admission:  10/22/2019  Assessment & Plan   Jim Richard is a 64 year old male admitted on 10/22/2019. He presents with suicidal ideation and intentional ingestion while intoxicated. Patient has history of alcoholic liver disease, ulcers, esophageal varices, CAD s/p CABG with residual left leg swelling, hypertension, hyperlipidemia, peripheral vascular disease, tobacco use, subdural hematoma, and chronic low back pain who presented to the ED with suicidal ideation and overdose on his blood pressure medications.     Depression  Suicidal ideation - resolved  Anxiety and depression  Intentional Ingestion  Presents with suicidal ideations in setting with significant alcohol abuse, and ingestion of twice the dosing of his pressure medications.  -Appreciate psych evaluation  -no longer suicidal and says it was from being intoxicated  -continue home psych medications per psychiatry  -consult CD as psych recommending inpatient treatment and says he should be committed if he does not attend (last treatment was last year and was sober for 6 months)    Alcohol Dependence   ALcohol withdrawal  Alcoholic liver disease  Hepatic encephalopathy  Ascites  EtOH level of 0.40 on admission. Status post paracentesis 10/23 AM with 4 liters removed.  - Appreciate psych evaluation  - Continue CIWA protocol  - telemetry, thiamine/folate/MV ordered   - Fluid restriction to 1 L given significant ascites  - continue home lactulose  - holding home lasix and spironolactone and plan to restart tomorrow  - psych and CD consulted    Essential hypertension  -holding home lasix, propranolol, spironolactone and plan to restart tomorrow    COPD (chronic obstructive pulmonary disease) (H)  JANIS on CPAP  -Stable with no evidence of exacerbation.     PUD  Anemia of chronic Disease  History of duodenal ulcer and esophageal varices:  Last admission was evaluated  by GI on 9/25, upper GI endoscopy without evidence of bleeding   - Continue Protonix 40 mg p.o. daily  - Etoh cessation encouraged  - Follow up in GI clinic as directed      Tobacco abuse:  pack a day smoker  -21 mg patch ordered per request  -Encouraged cessation during stay     Depression and Anxiety:  -continue PTA cymbalta, remeron, trazodone, seroquel     Pancytopenia:  Assessment/Plan: likely secondary to chronic alcoholism (bone marrow suppression) and gi bleed, recheck outpatient.      Diet: Fluid restriction 1000 ML FLUID  Regular Diet Adult    DVT Prophylaxis: Pneumatic Compression Devices  Leger Catheter: not present  Code Status: DNR/DNI      Disposition Plan   Expected discharge: 2 - 3 days, recommended to prior living arrangement once alcohol withdrawal improved and safe disposition plan arranged.  Entered: Carrillo Lewis MD 10/23/2019, 11:11 AM     The patient's care was discussed with the Bedside Nurse and Patient.    Carrillo Lewis MD  Hospitalist Service  [facility]    ______________________________________________________________________    Interval History   Continued shakes. Feeling better after thoracentesis. No fevers or signs of infection. Chronic cough and chronic diarrhea unchanged. No chest pain or shortness of breath. No rashes.    Data reviewed today: I reviewed all medications, new labs and imaging results over the last 24 hours. I personally reviewed no images or EKG's today.    Physical Exam   Vital Signs: Temp: 98.8  F (37.1  C) Temp src: Oral BP: (!) 144/73 Pulse: 96 Heart Rate: 91 Resp: 18 SpO2: 92 % O2 Device: None (Room air)    Weight: 0 lbs 0 oz  Constitutional: Awake, alert, cooperative, no apparent distress  Respiratory: Clear to auscultation bilaterally, no crackles or wheezing  Cardiovascular: Regular rate and rhythm, normal S1 and S2, and no murmur noted  GI: Normal bowel sounds, soft, mildly distended, non-tender  Skin/Integumen: No rashes, no  cyanosis, 2+ edema in left leg and 1+ in right leg (chronicly larger left leg from vein harvest)  Other:    Data   Recent Labs   Lab 10/23/19  0936 10/23/19  0915 10/22/19  2239 10/22/19  2123 10/22/19  1549   WBC 3.1* Canceled, Test credited, specimen discarded  --   --  5.6   HGB 8.2* Canceled, Test credited, specimen discarded  --   --  10.1*   MCV 98 Canceled, Test credited, specimen discarded  --   --  98   PLT PENDING Canceled, Test credited, specimen discarded  --   --  138*   INR  --   --   --   --  1.13     --  139 139 139   POTASSIUM 3.6  --  3.6 3.7 3.7   CHLORIDE 107  --  107 108 107   CO2 23  --  24 23 22   BUN 17  --  18 18 18   CR 0.80  --  0.86 0.88 0.86   ANIONGAP 7  --  8 8 10   KEV 7.9*  --  7.4* 7.5* 7.8*   GLC 83  --  101* 87 72   ALBUMIN 2.5*  --  2.6*  --  2.7*   PROTTOTAL 6.4*  --  6.5*  --  7.0   BILITOTAL 1.9*  --  1.2  --  1.2   ALKPHOS 266*  --  276*  --  303*   ALT 32  --  37  --  41   AST 90*  --  107*  --  116*     Recent Results (from the past 24 hour(s))   US Paracentesis    Narrative    PROCEDURE(S):   Ultrasound-guided paracentesis     DATE OF PROCEDURE:  10/23/2019 8:57 AM    OPERATORS:    Kelvin Boyce MD    COMPLICATIONS:   None     SPECIMENS TO LAB:  None    MEDICATION(S):  Lidocaine 1% SQ    CONTRAST:   None    REFERENCED AIR KERMA: 0 mGy  FLUOROSCOPY TIME: 0 minutes    ESTIMATED BLOOD LOSS:   Minimal    PRE-PROCEDURE DIAGNOSIS: Ascites  POST-PROCEDURE DIAGNOSIS: Same    CLINICAL HISTORY/INDICATION:  64-year-old male with symptomatic large volume recurrent ascites  presents for diagnostic and therapeutic paracentesis.    PROCEDURE AND FINDINGS:   Following a discussion of the risks, benefits, indications and  alternatives to treatment, appropriate informed consent was obtained.  The patient was brought to the Ultrasound suite and placed supine on  the table. The right abdomen and flank were prepped and aped in a  sterile fashion. A time out was performed per hospital  universal  protocol policy to ensure correct patient, site and procedure to be  performed.     Preliminary sonography of the right abdomen was performed and  demonstrates a large volume of ascites.  An ultrasound image was  archived. Local anesthesia was achieved with 1% lidocaine. A one-step  catheter was advanced into the peritoneal space under direct  sonographic guidance with visualization of needle tip entry into the  peritoneal fluid and return of clear yellow ascites. A total of 4250  mL of ascites was then removed. The catheter was removed uneventfully,  a sterile dressing applied.     Throughout the procedure, the patient was monitored by a radiology  nurse for heart rate, blood pressure and oxygen saturation which  remained stable. The patient tolerated the procedure well and left  interventional radiology in stable condition.       Impression    IMPRESSION:   Ultrasound-guided paracentesis.     ANIL KLEIN MD     Medications     lactated ringers 100 mL/hr at 10/23/19 0650       DULoxetine  60 mg Oral Daily     fluticasone-vilanterol  1 puff Inhalation Daily     mirtazapine  15 mg Oral At Bedtime     nicotine   Transdermal Q8H     nicotine   Transdermal Daily     pantoprazole  40 mg Oral QAM AC     QUEtiapine  50 mg Oral At Bedtime     sodium chloride (PF)  3 mL Intracatheter Q8H     traZODone  100 mg Oral At Bedtime

## 2019-10-23 NOTE — PROGRESS NOTES
MD Notification    Notified Person: MD    Notified Person Name: trenton     Notification Date/Time:10/22/19 2213    Notification Interaction: phone     Purpose of Notification: wanting pain medication and sleep aids     Orders Received: go ahead to give seroquel, really nothing to give for pain at this time. Will go ahead and put in a repeat BMP.     Comments: poison control will call sometime again over night to check in

## 2019-10-23 NOTE — PROGRESS NOTES
Sepsis Evaluation Progress Note    I was called to see Jim KVNG Richard due to abnormal vital signs triggering the Sepsis SIRS screening alert. He is not known to have an infection.     Physical Exam   Vital Signs:  Temp: 98.8  F (37.1  C) Temp src: Oral BP: (!) 144/73 Pulse: 96 Heart Rate: 91 Resp: 18 SpO2: 92 % O2 Device: None (Room air)      Lab:  Lactic Acid   Date Value Ref Range Status   09/26/2019 1.3 0.7 - 2.0 mmol/L Final     Lactate for Sepsis Protocol   Date Value Ref Range Status   10/23/2019 2.1 (H) 0.7 - 2.0 mmol/L Final     Comment:     Significant value called to and read back by  DAVID STEPHENS RN IN [facility]66 AT 1040 ME         The patient is at baseline mental status.     The rest of their physical exam is significant for abdominal ascites status post paracentesis this morning without pain.    Assessment & Plan   NO EVIDENCE OF SEPSIS at this time.  Vital sign, physical exam, and lab findings are likely due to alcoholism with withdrawal.    Disposition: The patient has an underlying condition of alcoholism and alcohol withdrawal that will continue to affect their vital signs and should not have further labs drawn to assess sepsis unless their clinical appearance changes.  Carrillo Lewis MD    Sepsis Criteria   Sepsis: 2+ SIRS criteria due to infection  Severe Sepsis: Sepsis AND 1+ new sign of acute organ dysfunction (Note: lactate >2 is organ dysfunction)  Septic Shock: Sepsis AND hypotension despite volume resuscitation with 30 ml/kg crystalloid

## 2019-10-23 NOTE — PHARMACY-ADMISSION MEDICATION HISTORY
Admission medication history interview status for the 10/22/2019  admission is complete. See EPIC admission navigator for prior to admission medications     Medication history source reliability:Poor    Actions taken by pharmacist (provider contacted, etc): list from 10-1-19 discharge, interviewed patient and he said he also takes Trazodone. Called [facility] who he states is his only pharmacy for last fill dates on some meds, sure script information on last refills also verified with [facility]     Additional medication history information not noted on PTA med list : obviously not compliant with medication due to refill history, but states he still has these meds at home    Medication reconciliation/reorder completed by provider prior to medication history? Yes    Time spent in this activity: 25 min    Prior to Admission medications    Medication Sig Last Dose Taking? Auth Provider   albuterol (PROAIR HFA/PROVENTIL HFA/VENTOLIN HFA) 108 (90 BASE) MCG/ACT Inhaler Inhale 2 puffs into the lungs every 6 hours as needed  prn Yes Unknown, Entered By History   DULoxetine (CYMBALTA) 60 MG EC capsule Take 1 capsule (60 mg) by mouth daily unknown Yes Brody Rand MD   fluticasone-vilanterol (BREO ELLIPTA) 200-25 MCG/INH oral inhaler Inhale 1 puff into the lungs daily   Yes Unknown, Entered By History   folic acid (FOLVITE) 1 MG tablet Take 1 tablet (1 mg) by mouth daily  Yes Elena Chiang MD   furosemide (LASIX) 40 MG tablet Take 1 tablet (40 mg) by mouth daily  Yes Elena Chiang MD   lactulose (CHRONULAC) 10 GM/15ML solution Take 30 mLs (20 g) by mouth 2 times daily  Yes Donell Lucas MD   mirtazapine (REMERON) 15 MG tablet Take 15 mg by mouth At Bedtime  Yes Unknown, Entered By History   multivitamin, therapeutic with minerals (THERA-VIT-M) TABS tablet Take 1 tablet by mouth daily  Yes Jude Duarte,    nicotine (NICODERM CQ) 21 MG/24HR 24 hr patch Place 1 patch onto the skin daily  Yes Андрей  MD Elena   pantoprazole (PROTONIX) 40 MG EC tablet Take 1 tablet (40 mg) by mouth every morning (before breakfast)  Yes Elena Chiang MD   propranolol (INDERAL) 10 MG tablet Take 1 tablet (10 mg) by mouth 2 times daily  Yes Elena Chiang MD   QUEtiapine (SEROQUEL) 50 MG tablet Take 50 mg by mouth At Bedtime   Yes Unknown, Entered By History   spironolactone (ALDACTONE) 25 MG tablet Take 2 tablets (50 mg) by mouth daily  Yes Elena Chiang MD   tamsulosin (FLOMAX) 0.4 MG capsule Take 0.4 mg by mouth daily  Yes Unknown, Entered By History   traZODone (DESYREL) 100 MG tablet Take 200 mg by mouth At Bedtime  Yes Unknown, Entered By History   vitamin B1 (THIAMINE) 100 MG tablet Take 1 tablet (100 mg) by mouth daily  Yes Elena Chiang MD   order for DME Equipment being ordered: Walker Wheels () and Walker ()  Treatment Diagnosis: difficulty walking   Donell Lucas MD

## 2019-10-23 NOTE — CONSULTS
"Consult Date:  10/23/2019      PSYCHIATRIC CONSULTATION      REASON FOR CONSULTATION:  Suicidal ideation, alcohol dependence.      REQUESTING PHYSICIAN:  Dr. Murcia      IDENTIFYING DATA:  Jim is a 64-year-old gentleman with an established history of a severe alcohol use disorder, who comes in acutely intoxicated with a blood alcohol level of 0.4, apparently having ingested additional doses of his blood pressure medications, stating it was suicidal.      CHIEF COMPLAINT:  \"Yeah, but I'm not suicidal now.\"      HISTORY OF PRESENT ILLNESS:  The patient is a 64-year-old gentleman with a very severe persistent alcohol use disorder, as well as major depressive disorder.  He has been hospitalized here on a number of occasions and was seen by Dr. Kearney for psychiatric consultation last month.  He claims his depression has not been controlled.  He has been drinking 0.5 liters of vodka daily over the last week.  He came into the ER heavily intoxicated with a blood alcohol level of 0.4 and a negative drug screen.  He has known esophageal varices and has evidence of ascites.  He has been in treatment countless times and despite recurrent recommendations to go into treatment, he has refused this repeatedly.  Now he is telling me he would agree to go to treatment, stating \"I need to do something.\"  He states he is no longer feeling suicidal.  He is convalescing on station 66 and seems to be showing signs of withdrawal.  He has been taking Cymbalta, Remeron and Seroquel.  It is unclear if he has been compliant with these medications.  He has a distant history of a suicide gesture by his report.      On further questioning, there is no convincing history of hypomania, bear, generalized anxiety, panic, obsessive-compulsive disorder, eating disorder history, trauma history, ADHD.  He alludes to some issues with sleep, anxiety and depressed mood.  This seems to escalate when he is drinking.  Currently, he smells of alcohol, he " looks disheveled.      PAST PSYCHIATRIC HISTORY:  As above.      PAST CHEMICAL DEPENDENCY HISTORY:  Notable for intractable alcohol dependence and multiple medical complications thereof.      PAST MEDICAL HISTORY:  Includes alcoholic liver disease with ascites, recurrent episodes of alcohol withdrawal, pancytopenia.      PRIOR TO ADMISSION MEDICATIONS:   1.  Thiamine.   2.  Flomax.   3.  Aldactone.   4.  Seroquel 50 mg at bedtime.   5.  Cymbalta 60 mg daily.   6.  Remeron 15 mg at bedtime.   7.  Inderal.   8.  Protonix.   9.  Nicoderm CQ.   10.  Chronulac.   11.  Lasix.   12.  Folvite.     13.  Breo Ellipta.     14.  ProAir.      FAMILY HISTORY/SOCIAL HISTORY:  His father had substance use issues along with his brother.  He currently lives locally with a roommate.  I believe he was previously .  Currently not working.  He denies legal or  history.      REVIEW OF SYSTEMS:  A 10-point review of systems is unchanged from Dr. Murcia's H and P 10/22/2019 at 7:33 p.m.      MOST RECENT VITAL SIGNS:  Temperature 98.8, pulse 86, respiratory rate 18, blood pressure 143/69, oxygen saturation 92%.      MENTAL STATUS EXAMINATION:  Appearance:  The patient is a malodorous gentleman, who is disheveled.  He still appears to be mildly intoxicated.  He is a poor historian.  Speech is of normal rate with slight stammer.  Use of language fair.  Motor exam:  Tremulous.  Coordination, station, gait impaired due to weakness and intoxication.  Muscle strength and tone diminished.  Affect is constricted.  Mood depressed.  Thought process logical, coherent and goal directed.  No loosening of associations, no flight of ideas.  No formal thought disorder on exam.  Thought content negative for hallucinations, delusions, paranoia, homicidal ideation.  He does acknowledge a willful ingestion of his blood pressure medication while intoxicated as he was feeling suicidal, but denies thoughts of wanting to hurt himself currently.   Insight and judgment poor.  Cognitive exam:  The patient is alert and oriented x 3.  Concentration fair.  Recent and remote memory intact.  General fund of knowledge average.      IMPRESSION:  The patient is a 64-year-old gentleman with an intractable history of alcohol dependence and underlying major depressive disorder.  He had a minor ingestion while intoxicated.  He is committable if he refuses to go to treatment.  At this point, I do not know that we have any other recourse but to recommend inpatient treatment in a hospital-based facility once he is deemed medically stable.  If he refuses that, then a commitment should be initiated to allow safe disposition.  I do not think he currently needs a sitter.  He is holdable if he demands to leave.  He is agreeing to stay at this point.  We will resume his Remeron, Cymbalta and Seroquel for now and continue to follow his progress.      DIAGNOSES:   1.  Alcohol use disorder, severe.   2.  Acute alcohol intoxication.   3.  Drug ingestion.   4.  Major depressive disorder by history.   5.  Multiple alcohol-related medical complications, including liver disease with ascites.      PLAN:   1.  Medical management.   2.  Resume outpatient psych medications.   3.  Okay to discontinue sitter and suicide precautions at this point.   4.  Strongly advised he needs to go to an inpatient hospital-based program once he is stable for CD treatment and if he refuses, he should be placed on a hold and a commitment should be started.  He is currently agreeing to that plan.         MONAE SANCHEZ MD             D: 10/23/2019   T: 10/23/2019   MT: PEREZ      Name:     DEVONTE SEGURA   MRN:      [id number]        Account:       [id number]   :      1955           Consult Date:  10/23/2019      Document: [id number]

## 2019-10-23 NOTE — CONSULTS
"CLINICAL NUTRITION SERVICES  -  ASSESSMENT NOTE    Recommendations Ordered by Registered Dietitian (RD):   - Weigh patient   - Diet, FR per MD  - Recommend Thiamine/Folic Acid per ETOH protocol. Added Thera vit M daily    Malnutrition:   % Weight Loss:  Unable to fully assess  % Intake:  Decreased intake does not meet criteria for malnutrition - Patient denies   Subcutaneous Fat Loss:  None observed  Muscle Loss:  None observed  Fluid Retention:  Moderate or greater    Malnutrition Diagnosis: Unable to determine due to lack of recent wt     REASON FOR ASSESSMENT  Jim Richard is a 64 year old male seen by Registered Dietitian for Admission Nutrition Risk Screen for unintentional loss of 10# or more in the past two months    NUTRITION HISTORY  - Information obtained from patient, EMR.     - Pt reports a 15# loss in the past 2 weeks \"since I was here last\". He denies changes to his oral intakes. \"I still eat when I drink\".   - Usually eats 2 meals/day. Hot dish, hot dogs, or grilled chicken.   - EMR notes that pt has been drinking 0.5L vodka daily.   - NKFA    CURRENT NUTRITION ORDERS  Diet Order:     Regular + FR 1 L    Current Intake/Tolerance:  Ate breakfast, egg sandwich with hash browns. Both were good and well tolerated.     NUTRITION FOCUSED PHYSICAL ASSESSMENT FOR DIAGNOSING MALNUTRITION)  Completed:  Yes Visual assessment only         Observed:    No nutrition-related physical findings observed   Distended abdomen noted    Obtained from Chart/Interdisciplinary Team:  10/23 - Paracentesis: 4250 mL removed.     ANTHROPOMETRICS  Height: 5' 7\"  Weight: 0 lbs 0 oz  There is no height or weight on file to calculate BMI.  Weight Status:  Unable to evaluate   IBW: 67.3 kg  % IBW: Unable to evaluate   Weight History: Pt reports a weight loss of 15# since last admission (?9%). Unable to verify with current wt  Wt Readings from Last 10 Encounters:   10/01/19 75.8 kg (167 lb)   08/13/19 84 kg (185 lb 3 oz) "   07/10/19 90.7 kg (200 lb)   07/03/19 90.7 kg (200 lb)   07/03/19 90.7 kg (200 lb)   02/13/19 97.5 kg (215 lb)   01/21/19 90.7 kg (200 lb)   12/24/18 99.8 kg (220 lb)   12/05/18 99.8 kg (220 lb)   05/21/18 86.2 kg (190 lb)       LABS  Labs reviewed    MEDICATIONS  Medications reviewed  Remeron 15 mg daily   LR @ 100 mL    ASSESSED NUTRITION NEEDS PER APPROVED PRACTICE GUIDELINES:  Dosing Weight No current wt on file  Estimated Energy Needs:(20-25 Kcal/Kg and 25-30 Kcal/Kg)  Justification: maintenance  Estimated Protein Needs: (1.2-1.5 g pro/Kg)  Justification: preservation of lean body mass  Estimated Fluid Needs: Per MD    MALNUTRITION:  % Weight Loss:  Unable to fully assess  % Intake:  Decreased intake does not meet criteria for malnutrition - Patient denies   Subcutaneous Fat Loss:  None observed  Muscle Loss:  None observed  Fluid Retention:  Moderate or greater    Malnutrition Diagnosis: Unable to determine due to lack of recent wt     NUTRITION DIAGNOSIS:  Predicted inadequate nutrient intake (energy/protein) related to potential for distention in abdomen to impact appetite/tolerance for adequate portions.      NUTRITION INTERVENTIONS  Recommendations / Nutrition Prescription  Diet per MD + FR    Weigh patient    Add Thera vit M. Additional micronutrients per Etoh protocol (folic acid, thiamine - per MD)     Implementation  Nutrition education: Provided education on fluid restriction, current reg diet order   Multivitamin/Mineral: see above      Nutrition Goals  Intake of 75% meals TID or greater.       MONITORING AND EVALUATION:  Progress towards goals will be monitored and evaluated per protocol and Practice Guidelines    Summer Isaac RD, LD  [facility] [facility], [facility]   Pager: [phone]  Weekend Pager: [phone]

## 2019-10-23 NOTE — PLAN OF CARE
DATE & TIME: 10/13/19 7-3pm   Cognitive Concerns/ Orientation : A&OX3/forgetful   BEHAVIOR & AGGRESSION TOOL COLOR: Green  CIWA SCORE: 3/8/3/8/6   ABNL VS/O2: Vss, on RA  MOBILITY: sba/belt  PAIN MANAGMENT: Denies  DIET: Regular with 1000cc fluid restriction  BOWEL/BLADDER: Incontinent of both at times  ABNL LAB/BG: sepsis fired, lactic 2.1 (not doing anything at this time)  DRAIN/DEVICES: NA  TELEMETRY RHYTHM: ST  SKIN: bruised, has band aid to RLQ   TESTS/PROCEDURES: Had Paracentesis this am, took off 4250cc  D/C DAY/GOALS/PLACE: 1-2 days  OTHER IMPORTANT INFO: Ativan given X3 today for CIWA.  Tolerating diet, up in chair for meals.  LR infusing at 100cc. Paracentesis site C/D/I

## 2019-10-23 NOTE — PROGRESS NOTES
DATE & TIME: 1900-0730    Cognitive Concerns/ Orientation : A/O x4   BEHAVIOR & AGGRESSION TOOL COLOR: yellow (withdrawaling)  CIWA SCORE: 8/12/7/7/   ABNL VS/O2: T 99.7. RR 20-24. RA  MOBILITY: SBA  PAIN MANAGMENT: right sided abdominal pain; heat and ice declined; no pain meds at this time see provider notification   DIET: NPO @ 00   BOWEL/BLADDER: up to bathroom; pt states frequency and sometimes he can't tell when he is going to urinate versus bowel movement. Abdominal distention; firm. BS present.   ABNL LAB/BG: ETOH 0.4. .   DRAIN/DEVICES: Last IV clotted; new IV in rt lower forearm with LR @100.   TELEMETRY RHYTHM: SR with BBB  SKIN: bruised, scabbed, lemuel lower extremities; BLE edema    TESTS/PROCEDURES: paracentesis tomorrow 10/23/19  D/C DAY/GOALS/PLACE: Discharge TBD  OTHER IMPORTANT INFO: sitter in place. Suicide precautions. VPM on waiting list. Denies suicidal thoughts at this time, says it is not as bad as when he came in. Holdable.

## 2019-10-23 NOTE — PROGRESS NOTES
Current condition Calm/cooperative  Sitter present:Yes  Every 15 minute documentation by NA/RN completed for Shift: Yes  Room safety Suicide Checklist completed in Epic:Yes  Patient's color of severity (suicide scale):   Order for psych consult placed (if appropriate): YEs  Suicide care plan added:Yes      Felecia Narayanan RN

## 2019-10-23 NOTE — PROCEDURES
[facility]    Procedure: Paracentesis  Date/Time: 10/23/2019 8:31 AM  Performed by: Kelvin Boyce MD  Authorized by: Kelvin Boyce MD     UNIVERSAL PROTOCOL   Site Marked: Yes  Prior Images Obtained and Reviewed:  Yes  Required items: Required blood products, implants, devices and special equipment available    Patient identity confirmed:  Verbally with patient  NA - No sedation, light sedation, or local anesthesia  Confirmation Checklist:  Patient's identity using two indicators, procedure was appropriate and matched the consent or emergent situation, correct equipment/implants were available and relevant allergies  Time out: Immediately prior to the procedure a time out was called    Preparation: Patient was prepped and draped in usual sterile fashion       ANESTHESIA    Anesthesia: Local infiltration  Local Anesthetic:  Lidocaine 1% without epinephrine    PROCEDURE   Patient Tolerance:  Patient tolerated the procedure well with no immediate complications    Time of Sedation in Minutes by Physician:  Cynthia

## 2019-10-23 NOTE — PROGRESS NOTES
"SPIRITUAL HEALTH SERVICES Progress Note  [facility]    Initiated visit due to pt request.  SH made brief contact w/ pt twice, but pt stated that it was not a good time for a visit.  Pt stated that he was feeling \"a lot of guilt\" about his family, stating that he \"screwed up big time.\"  SH will remain available for follow-up on 10/24.      Edilsno Siu  Chaplain Resident    "

## 2019-10-23 NOTE — PROVIDER NOTIFICATION
MD Notification    Notified Person: MD    Notified Person Name:trenton     Notification Date/Time: 10/23/19 0030    Notification Interaction:phone    Purpose of Notification: poison control would like repeat of AST and ALT labs. Thank you.     Orders Received: hepatic panel ordered    Comments:

## 2019-10-24 LAB
ERYTHROCYTE [DISTWIDTH] IN BLOOD BY AUTOMATED COUNT: 15.1 % (ref 10–15)
HCT VFR BLD AUTO: 25.8 % (ref 40–53)
HGB BLD-MCNC: 8.4 G/DL (ref 13.3–17.7)
LACTATE BLD-SCNC: 1.1 MMOL/L (ref 0.7–2)
MCH RBC QN AUTO: 32.6 PG (ref 26.5–33)
MCHC RBC AUTO-ENTMCNC: 32.6 G/DL (ref 31.5–36.5)
MCV RBC AUTO: 100 FL (ref 78–100)
PLATELET # BLD AUTO: 63 10E9/L (ref 150–450)
RBC # BLD AUTO: 2.58 10E12/L (ref 4.4–5.9)
WBC # BLD AUTO: 3 10E9/L (ref 4–11)

## 2019-10-24 PROCEDURE — 25000128 H RX IP 250 OP 636: Performed by: STUDENT IN AN ORGANIZED HEALTH CARE EDUCATION/TRAINING PROGRAM

## 2019-10-24 PROCEDURE — 99232 SBSQ HOSP IP/OBS MODERATE 35: CPT | Performed by: INTERNAL MEDICINE

## 2019-10-24 PROCEDURE — 25000132 ZZH RX MED GY IP 250 OP 250 PS 637: Mod: GY | Performed by: STUDENT IN AN ORGANIZED HEALTH CARE EDUCATION/TRAINING PROGRAM

## 2019-10-24 PROCEDURE — 25800030 ZZH RX IP 258 OP 636: Performed by: STUDENT IN AN ORGANIZED HEALTH CARE EDUCATION/TRAINING PROGRAM

## 2019-10-24 PROCEDURE — 85027 COMPLETE CBC AUTOMATED: CPT | Performed by: INTERNAL MEDICINE

## 2019-10-24 PROCEDURE — 25000132 ZZH RX MED GY IP 250 OP 250 PS 637: Mod: GY | Performed by: INTERNAL MEDICINE

## 2019-10-24 PROCEDURE — 12000000 ZZH R&B MED SURG/OB

## 2019-10-24 PROCEDURE — 83605 ASSAY OF LACTIC ACID: CPT | Performed by: INTERNAL MEDICINE

## 2019-10-24 PROCEDURE — 25000132 ZZH RX MED GY IP 250 OP 250 PS 637: Mod: GY | Performed by: PSYCHIATRY & NEUROLOGY

## 2019-10-24 PROCEDURE — 36415 COLL VENOUS BLD VENIPUNCTURE: CPT | Performed by: INTERNAL MEDICINE

## 2019-10-24 RX ORDER — PROPRANOLOL HYDROCHLORIDE 10 MG/1
10 TABLET ORAL 2 TIMES DAILY
Status: DISCONTINUED | OUTPATIENT
Start: 2019-10-24 | End: 2019-10-30 | Stop reason: HOSPADM

## 2019-10-24 RX ORDER — SPIRONOLACTONE 25 MG/1
50 TABLET ORAL DAILY
Status: DISCONTINUED | OUTPATIENT
Start: 2019-10-24 | End: 2019-10-26

## 2019-10-24 RX ORDER — FUROSEMIDE 40 MG
40 TABLET ORAL DAILY
Status: DISCONTINUED | OUTPATIENT
Start: 2019-10-24 | End: 2019-10-27

## 2019-10-24 RX ADMIN — SODIUM CHLORIDE, POTASSIUM CHLORIDE, SODIUM LACTATE AND CALCIUM CHLORIDE: 600; 310; 30; 20 INJECTION, SOLUTION INTRAVENOUS at 13:16

## 2019-10-24 RX ADMIN — SODIUM CHLORIDE, POTASSIUM CHLORIDE, SODIUM LACTATE AND CALCIUM CHLORIDE: 600; 310; 30; 20 INJECTION, SOLUTION INTRAVENOUS at 02:38

## 2019-10-24 RX ADMIN — TRAZODONE HYDROCHLORIDE 100 MG: 100 TABLET ORAL at 20:31

## 2019-10-24 RX ADMIN — DULOXETINE HYDROCHLORIDE 60 MG: 30 CAPSULE, DELAYED RELEASE ORAL at 10:17

## 2019-10-24 RX ADMIN — QUETIAPINE FUMARATE 50 MG: 50 TABLET ORAL at 20:30

## 2019-10-24 RX ADMIN — LORAZEPAM 2 MG: 2 INJECTION, SOLUTION INTRAMUSCULAR; INTRAVENOUS at 13:16

## 2019-10-24 RX ADMIN — LORAZEPAM 1 MG: 1 TABLET ORAL at 15:18

## 2019-10-24 RX ADMIN — FUROSEMIDE 40 MG: 40 TABLET ORAL at 16:29

## 2019-10-24 RX ADMIN — LORAZEPAM 1 MG: 1 TABLET ORAL at 17:34

## 2019-10-24 RX ADMIN — LORAZEPAM 1 MG: 1 TABLET ORAL at 04:10

## 2019-10-24 RX ADMIN — PROPRANOLOL HYDROCHLORIDE 10 MG: 10 TABLET ORAL at 20:31

## 2019-10-24 RX ADMIN — LORAZEPAM 1 MG: 1 TABLET ORAL at 16:29

## 2019-10-24 RX ADMIN — MIRTAZAPINE 15 MG: 15 TABLET, FILM COATED ORAL at 20:31

## 2019-10-24 RX ADMIN — LORAZEPAM 1 MG: 1 TABLET ORAL at 22:48

## 2019-10-24 RX ADMIN — NICOTINE 1 PATCH: 21 PATCH, EXTENDED RELEASE TRANSDERMAL at 10:20

## 2019-10-24 RX ADMIN — LORAZEPAM 1 MG: 1 TABLET ORAL at 06:12

## 2019-10-24 RX ADMIN — LORAZEPAM 1 MG: 1 TABLET ORAL at 20:39

## 2019-10-24 RX ADMIN — SPIRONOLACTONE 50 MG: 25 TABLET ORAL at 16:29

## 2019-10-24 RX ADMIN — LORAZEPAM 2 MG: 2 INJECTION, SOLUTION INTRAMUSCULAR; INTRAVENOUS at 14:14

## 2019-10-24 ASSESSMENT — ACTIVITIES OF DAILY LIVING (ADL)
ADLS_ACUITY_SCORE: 14
ADLS_ACUITY_SCORE: 13

## 2019-10-24 NOTE — PROVIDER NOTIFICATION
MD Notification    Notified Person: MD    Notified Person Name: Joshua    Notification Date/Time: 102/24/19 1035    Notification Interaction: Text page    Purpose of Notification: Patient not alert enough to swallow oral pills at this time. Are there any meds you want to switch to IV? Thanks!    Orders Received: Okay to hold per patient care order    Comments:

## 2019-10-24 NOTE — PROGRESS NOTES
SPIRITUAL HEALTH SERVICES Progress Note  [facility]    F/U visit.  Pt states that he needs to participate again in AA for support.  SH affirmed pt's intention to use AA--its Steps, Sponsor and community--to promote his recovery and provide support.  Pt then fell asleep.  SH/SHS remains available per need/request.                                                                                                                                           Dez Barbour M.Div., [facility]  Staff   Pager [phone]

## 2019-10-24 NOTE — PLAN OF CARE
DATE & TIME: 10/24/19 3805-9299                      Cognitive Concerns/ Orientation : A+Ox2   BEHAVIOR & AGGRESSION TOOL COLOR: Yellow; mildly agitated, restless  CIWA SCORE: 6, 10  ABNL VS/O2: RA  MOBILITY: Assist x2  PAIN MANAGMENT: Complaint of headache  DIET: Reg  BOWEL/BLADDER: Continent, frequent urination. Using urinal in bed this shift.   ABNL LAB/BG: NA  DRAIN/DEVICES: PIV /hr  TELEMETRY RHYTHM: NSR  SKIN: Bruising, lemuel, red  TESTS/PROCEDURES: NA  D/C DAY/GOALS/PLACE: Pending progress  OTHER IMPORTANT INFO: Ativan x1 this shift. Sitter at bedside overnight. VPM in place.

## 2019-10-24 NOTE — PLAN OF CARE
DATE & TIME: 10/23/2019 6046-6454    Cognitive Concerns/ Orientation : A+Ox2   BEHAVIOR & AGGRESSION TOOL COLOR: Yellow; mildly agitated, restless  CIWA SCORE: 13,6,13,13  ABNL VS/O2: RA  MOBILITY: Assist x1  PAIN MANAGMENT: Denies  DIET: Reg  BOWEL/BLADDER: Continent, frequent urination  ABNL LAB/BG: NA  DRAIN/DEVICES: NA  TELEMETRY RHYTHM: NSR  SKIN: Bruising, lemuel, red  TESTS/PROCEDURES: NA  D/C DAY/GOALS/PLACE: TBD,   OTHER IMPORTANT INFO: Ativan x4 this shift

## 2019-10-24 NOTE — PROGRESS NOTES
Sepsis BPA fired when NA entered vital signs at 1130, NA notified RN. Found that temperature had been incorrectly entered into chart. Accurate temperature reading entered into chart. Protocol not ordered and not needed.

## 2019-10-24 NOTE — CONSULTS
10/24/19 chem dep consult acknowledged. Per EHR, patient is not appropriate for interview today.  Once he is A&O x4, chem dep will see patient.  Will continued to monitor.  Per EHR, patient has Medicare managed insurance and is limited to services through a Medicare approved provider.  If patient is appropriate for discharge prior to being seen by chem dep, a evaluation can be scheduled [facility], [phone].    GARIMA Robbins, [facility]  [phone]

## 2019-10-24 NOTE — PLAN OF CARE
DATE & TIME: 10/24 Days  Cognitive Concerns/ Orientation : Alert to self  BEHAVIOR & AGGRESSION TOOL COLOR:  Yellow  CIWA SCORE: 7,9,14, 13  ABNL VS/O2: RA  MOBILITY: A2, did not get out of bed this shift.   PAIN MANAGMENT: Complaints of headache, Tylenol not given d/t inability to swallow pills at this time   DIET: Regular, very sedated this AM did not eat breakfast. 1000 ml fluid restriction  BOWEL/BLADDER: Voiding per urinal, occasionally incontinent.   ABNL LAB/BG: WBC 3.0  DRAIN/DEVICES: Tele  TELEMETRY RHYTHM: NSR  SKIN: Cuts/bruises on arms and legs  TESTS/PROCEDURES: None  D/C DAY/GOALS/PLACE: TBD  OTHER IMPORTANT INFO: Sitter and VPM at bedside      Patient very lethargic this morning and unable to take oral medications safely became more alert around 1300. Received PRN Ativan (IV)x2 for CIWA scores of 14 and 13.

## 2019-10-24 NOTE — PROGRESS NOTES
[facility]    Medicine Progress Note - Hospitalist Service       Date of Admission:  10/22/2019  Assessment & Plan   Jim Richard is a 64 year old male admitted on 10/22/2019. He presents with suicidal ideation and intentional ingestion while intoxicated. Patient has history of alcoholic liver disease, ulcers, esophageal varices, CAD s/p CABG with residual left leg swelling, hypertension, hyperlipidemia, peripheral vascular disease, tobacco use, subdural hematoma, and chronic low back pain who presented to the ED with suicidal ideation and overdose on his blood pressure medications.     Depression  Suicidal ideation - resolved  Anxiety and depression  Intentional Ingestion  Presents with suicidal ideations in setting with significant alcohol abuse, and ingestion of twice the dosing of his pressure medications.  -Appreciate psych evaluation  -no longer suicidal and says it was from being intoxicated  -continue home psych medications per psychiatry    Alcohol Dependence   ALcohol withdrawal with delirium tremens  Alcoholic liver disease  Hepatic encephalopathy  Ascites  EtOH level of 0.40 on admission. Status post paracentesis 10/23 AM with 4 liters removed.  - Appreciate psych evaluation  - Continue CIWA protocol and noticing worsening confusion with some delirium tremens  - telemetry, thiamine/folate/MV ordered   - Fluid restriction decreased to 1 L this stay given significant ascites  - continue home lactulose  - holding home lasix and spironolactone and plan to restart tomorrow  -CD consult pending patient better interaction but recommend an evaluation to be scheduled with [facility], [phone].  (last treatment was last year and was sober for 6 months)    Essential hypertension  -restart home propranolol, lasix, and spironolactone  -some of his hypertension is likely secondary to withdrawal    COPD (chronic obstructive pulmonary disease) (H)  JANIS on  CPAP  -Stable with no evidence of exacerbation.     PUD  Anemia of chronic Disease  History of duodenal ulcer and esophageal varices:  Last admission was evaluated by GI on 9/25, upper GI endoscopy without evidence of bleeding   - Continue Protonix 40 mg p.o. daily  - Etoh cessation encouraged  - Follow up in GI clinic as directed      Tobacco abuse:  pack a day smoker  -continue 21 mg patch daily  -Encouraged cessation during stay     Depression and Anxiety:  -continue PTA cymbalta, remeron, trazodone, seroquel     Pancytopenia:  -likely secondary to chronic alcoholism (bone marrow suppression) and gi bleed  -CBC stable on 10/24, continue to follow as outpatient to make sure returns to normal off alcohol      Diet: Fluid restriction 1000 ML FLUID  Regular Diet Adult    DVT Prophylaxis: Pneumatic Compression Devices  Leger Catheter: not present  Code Status: DNR/DNI      Disposition Plan   Expected discharge: 2 - 3 days, recommended to prior living arrangement and then inpatient alcohol treatment program once alcohol withdrawal improved and safe disposition plan arranged.  Entered: Carrillo Lewis MD 10/24/2019, 3:20 PM     The patient's care was discussed with the Bedside Nurse and Patient.    Carrillo Lewis MD  Hospitalist Service  [facility]    ______________________________________________________________________    Interval History   Continued shakes and worsening confusion. [place]. No chest pain or shortness of breath. History limited by patient confusion.    Data reviewed today: I reviewed all medications, new labs and imaging results over the last 24 hours. I personally reviewed no images or EKG's today.    Physical Exam   Vital Signs: Temp: 97.9  F (36.6  C) Temp src: Axillary BP: 137/82 Pulse: 84 Heart Rate: 78 Resp: 18 SpO2: 94 % O2 Device: None (Room air)    Weight: 184 lbs 8.4 oz  Constitutional: Awake, alert, cooperative, confused and  shaking  Respiratory: Clear to auscultation bilaterally, no crackles or wheezing  Cardiovascular: Regular rate and rhythm, normal S1 and S2, and no murmur noted  GI: Normal bowel sounds, soft, mildly distended, non-tender  Skin/Integumen: No rashes, no cyanosis, 2+ edema in left leg and 1+ in right leg (chronicly larger left leg from vein harvest)  Other:    Data   Recent Labs   Lab 10/24/19  0839 10/23/19  0936 10/23/19  0915 10/22/19  2239 10/22/19  2123 10/22/19  1549   WBC 3.0* 3.1* Canceled, Test credited, specimen discarded  --   --  5.6   HGB 8.4* 8.2* Canceled, Test credited, specimen discarded  --   --  10.1*    98 Canceled, Test credited, specimen discarded  --   --  98   PLT 63* 62* Canceled, Test credited, specimen discarded  --   --  138*   INR  --   --   --   --   --  1.13   NA  --  137  --  139 139 139   POTASSIUM  --  3.6  --  3.6 3.7 3.7   CHLORIDE  --  107  --  107 108 107   CO2  --  23  --  24 23 22   BUN  --  17  --  18 18 18   CR  --  0.80  --  0.86 0.88 0.86   ANIONGAP  --  7  --  8 8 10   KEV  --  7.9*  --  7.4* 7.5* 7.8*   GLC  --  83  --  101* 87 72   ALBUMIN  --  2.5*  --  2.6*  --  2.7*   PROTTOTAL  --  6.4*  --  6.5*  --  7.0   BILITOTAL  --  1.9*  --  1.2  --  1.2   ALKPHOS  --  266*  --  276*  --  303*   ALT  --  32  --  37  --  41   AST  --  90*  --  107*  --  116*     No results found for this or any previous visit (from the past 24 hour(s)).  Medications     lactated ringers 100 mL/hr at 10/24/19 1316       DULoxetine  60 mg Oral Daily     fluticasone-vilanterol  1 puff Inhalation Daily     mirtazapine  15 mg Oral At Bedtime     multivitamin w/minerals  1 tablet Oral Daily     nicotine   Transdermal Q8H     nicotine   Transdermal Daily     pantoprazole  40 mg Oral QAM AC     QUEtiapine  50 mg Oral At Bedtime     sodium chloride (PF)  3 mL Intracatheter Q8H     traZODone  100 mg Oral At Bedtime

## 2019-10-24 NOTE — PLAN OF CARE
DATE & TIME: 10/24 Days  Cognitive Concerns/ Orientation : Lethargic, oriented to self  BEHAVIOR & AGGRESSION TOOL COLOR:  Yellow  CIWA SCORE: 7, 9, 14, 13, 9, 10, 11,7  ABNL VS/O2: HTN, RA  MOBILITY: A2-3, did not get out of bed this shift.   PAIN MANAGMENT: intermittent complaints of headache  DIET: Regular. 1000mL fluid restriction  BOWEL/BLADDER: Voiding per urinal, incontinent of stool  ABNL LAB/BG: WBC 3.0  DRAIN/DEVICES: IV SL, Tele  TELEMETRY RHYTHM: NSR  SKIN: Cuts/bruises on arms and legs  TESTS/PROCEDURES: None  D/C DAY/GOALS/PLACE: [facility]  OTHER IMPORTANT INFO: Alcohol withdrawal with delirium tremens. Sitter and VPM at bedside

## 2019-10-25 PROCEDURE — 25000132 ZZH RX MED GY IP 250 OP 250 PS 637: Mod: GY | Performed by: PSYCHIATRY & NEUROLOGY

## 2019-10-25 PROCEDURE — 25000132 ZZH RX MED GY IP 250 OP 250 PS 637: Mod: GY | Performed by: STUDENT IN AN ORGANIZED HEALTH CARE EDUCATION/TRAINING PROGRAM

## 2019-10-25 PROCEDURE — 12000000 ZZH R&B MED SURG/OB

## 2019-10-25 PROCEDURE — 25000132 ZZH RX MED GY IP 250 OP 250 PS 637: Mod: GY | Performed by: INTERNAL MEDICINE

## 2019-10-25 PROCEDURE — 99232 SBSQ HOSP IP/OBS MODERATE 35: CPT | Performed by: INTERNAL MEDICINE

## 2019-10-25 RX ADMIN — QUETIAPINE FUMARATE 50 MG: 50 TABLET ORAL at 21:28

## 2019-10-25 RX ADMIN — SPIRONOLACTONE 50 MG: 25 TABLET ORAL at 09:38

## 2019-10-25 RX ADMIN — PROPRANOLOL HYDROCHLORIDE 10 MG: 10 TABLET ORAL at 21:29

## 2019-10-25 RX ADMIN — PROPRANOLOL HYDROCHLORIDE 10 MG: 10 TABLET ORAL at 09:38

## 2019-10-25 RX ADMIN — NICOTINE 1 PATCH: 21 PATCH, EXTENDED RELEASE TRANSDERMAL at 11:43

## 2019-10-25 RX ADMIN — LORAZEPAM 1 MG: 1 TABLET ORAL at 00:38

## 2019-10-25 RX ADMIN — MIRTAZAPINE 15 MG: 15 TABLET, FILM COATED ORAL at 21:29

## 2019-10-25 RX ADMIN — PANTOPRAZOLE SODIUM 40 MG: 40 TABLET, DELAYED RELEASE ORAL at 09:38

## 2019-10-25 RX ADMIN — FUROSEMIDE 40 MG: 40 TABLET ORAL at 09:39

## 2019-10-25 RX ADMIN — LORAZEPAM 2 MG: 1 TABLET ORAL at 04:36

## 2019-10-25 RX ADMIN — TRAZODONE HYDROCHLORIDE 100 MG: 100 TABLET ORAL at 21:28

## 2019-10-25 RX ADMIN — MULTIPLE VITAMINS W/ MINERALS TAB 1 TABLET: TAB at 09:38

## 2019-10-25 RX ADMIN — DULOXETINE HYDROCHLORIDE 60 MG: 30 CAPSULE, DELAYED RELEASE ORAL at 09:38

## 2019-10-25 ASSESSMENT — ACTIVITIES OF DAILY LIVING (ADL)
ADLS_ACUITY_SCORE: 22
ADLS_ACUITY_SCORE: 20
ADLS_ACUITY_SCORE: 22
ADLS_ACUITY_SCORE: 19
ADLS_ACUITY_SCORE: 22
ADLS_ACUITY_SCORE: 22

## 2019-10-25 NOTE — PROGRESS NOTES
[facility]  Hospitalist Progress Note  Donell Lucas MD  10/25/2019    Assessment & Plan   Jim Richard is a 64 year old male admitted on 10/22/2019. He presents with suicidal ideation and intentional ingestion while intoxicated. Patient has history of alcoholic liver disease, ulcers, esophageal varices, CAD s/p CABG with residual left leg swelling, hypertension, hyperlipidemia, peripheral vascular disease, tobacco use, subdural hematoma, and chronic low back pain who presented to the ED with suicidal ideation and overdose on his blood pressure medications.      Depression  Suicidal ideation - resolved  Anxiety and depression  Intentional Ingestion  Presents with suicidal ideations in setting with significant alcohol abuse, and ingestion of twice the dosing of his pressure medications.  -Appreciate psych evaluation  -no longer suicidal and says it was from being intoxicated  -continue home psych medications per psychiatry     Alcohol Dependence   ALcohol withdrawal with delirium tremens  Alcoholic liver disease  Hepatic encephalopathy  Ascites  EtOH level of 0.40 on admission. Status post paracentesis 10/23 AM with 4 liters removed.  - Appreciate psych evaluation  - Continue CIWA protocol and noticing worsening confusion with some delirium tremens  - telemetry, thiamine/folate/MV ordered   - Fluid restriction decreased to 1 L this stay given significant ascites  - continue home lactulose  - continue lasix and spironolactone    -CD consult pending patient better interaction but recommend an evaluation to be scheduled with [facility], [phone].  (last treatment was last year and was sober for 6 months)  -will need some recovery to see if he can go directly to CD treatment vs TCU     Essential hypertension  -continue on propranolol, lasix, and spironolactone  -some of his hypertension is likely secondary to withdrawal     COPD (chronic obstructive  pulmonary disease) (H)  JANIS on CPAP  -Stable with no evidence of exacerbation.      PUD  Anemia of chronic Disease  History of duodenal ulcer and esophageal varices:  Last admission was evaluated by GI on 9/25, upper GI endoscopy without evidence of bleeding   - Continue Protonix 40 mg p.o. daily  - Etoh cessation encouraged  - Follow up in GI clinic as directed      Tobacco abuse:  pack a day smoker  -continue 21 mg patch daily  -Encouraged cessation during stay     Depression and Anxiety:  -continue PTA cymbalta, remeron, trazodone, seroquel     Pancytopenia:  -likely secondary to chronic alcoholism (bone marrow suppression) and gi bleed  -CBC stable on 10/24, continue to follow as outpatient to make sure returns to normal off alcohol     Diet: Fluid restriction 1000 ML FLUID  Regular Diet Adult    DVT Prophylaxis: Pneumatic Compression Devices  Leger Catheter: not present  Code Status: DNR/DNI          Disposition Plan     Expected discharge: 2 - 3 days,    Interval History   -- chart reviewed  -- discussed with nursing  -- somnolent this AM, received ativan overnight  -- arouseable, confused    -Data reviewed today: I reviewed all new labs and imaging over the last 24 hours. I personally reviewed no images or EKG's today.    Physical Exam   Heart Rate: 89, Blood pressure 127/76, pulse 66, temperature 99  F (37.2  C), temperature source Oral, resp. rate 16, weight 83.7 kg (184 lb 8.4 oz), SpO2 92 %.  Vitals:    10/24/19 0347   Weight: 83.7 kg (184 lb 8.4 oz)     Vital Signs with Ranges  Temp:  [97.7  F (36.5  C)-99  F (37.2  C)] 99  F (37.2  C)  Pulse:  [66] 66  Heart Rate:  [] 89  Resp:  [16-18] 16  BP: (125-157)/(74-94) 127/76  SpO2:  [92 %-100 %] 92 %  I/O's Last 24 hours  I/O last 3 completed shifts:  In: 1020 [P.O.:1020]  Out: 1525 [Urine:1525]    Constitutional:  no apparent distress, arouses, confused  Respiratory: Clear to auscultation bilaterally, no crackles or wheezing  Cardiovascular: Regular  rate and rhythm, normal S1 and S2, and no murmur noted  GI: Normal bowel sounds, soft, non-distended, non-tender  Skin/Integumen: No rashes, no cyanosis, no edema  Other:      Medications   All medications were reviewed.      DULoxetine  60 mg Oral Daily     fluticasone-vilanterol  1 puff Inhalation Daily     furosemide  40 mg Oral Daily     mirtazapine  15 mg Oral At Bedtime     multivitamin w/minerals  1 tablet Oral Daily     nicotine   Transdermal Q8H     nicotine   Transdermal Daily     pantoprazole  40 mg Oral QAM AC     propranolol  10 mg Oral BID     QUEtiapine  50 mg Oral At Bedtime     sodium chloride (PF)  3 mL Intracatheter Q8H     spironolactone  50 mg Oral Daily     traZODone  100 mg Oral At Bedtime        Data   Recent Labs   Lab 10/24/19  0839 10/23/19  0936 10/23/19  0915 10/22/19  2239 10/22/19  2123 10/22/19  1549   WBC 3.0* 3.1* Canceled, Test credited, specimen discarded  --   --  5.6   HGB 8.4* 8.2* Canceled, Test credited, specimen discarded  --   --  10.1*    98 Canceled, Test credited, specimen discarded  --   --  98   PLT 63* 62* Canceled, Test credited, specimen discarded  --   --  138*   INR  --   --   --   --   --  1.13   NA  --  137  --  139 139 139   POTASSIUM  --  3.6  --  3.6 3.7 3.7   CHLORIDE  --  107  --  107 108 107   CO2  --  23  --  24 23 22   BUN  --  17  --  18 18 18   CR  --  0.80  --  0.86 0.88 0.86   ANIONGAP  --  7  --  8 8 10   KEV  --  7.9*  --  7.4* 7.5* 7.8*   GLC  --  83  --  101* 87 72   ALBUMIN  --  2.5*  --  2.6*  --  2.7*   PROTTOTAL  --  6.4*  --  6.5*  --  7.0   BILITOTAL  --  1.9*  --  1.2  --  1.2   ALKPHOS  --  266*  --  276*  --  303*   ALT  --  32  --  37  --  41   AST  --  90*  --  107*  --  116*       No results found for this or any previous visit (from the past 24 hour(s)).    Donell Lucas MD  Text Page  (7am to 6pm)

## 2019-10-25 NOTE — PLAN OF CARE
Problem: Alcohol Withdrawal  Goal: Alcohol Withdrawal Symptom Control  10/25/2019 1354 by Rowan Louie RN  Outcome: No Change  10/25/2019 0602 by Arcelia Don RN  Outcome: No Change     Problem: Adult Inpatient Plan of Care  Goal: Optimal Comfort and Wellbeing  10/25/2019 1354 by Rowan Louie RN  Outcome: No Change  10/25/2019 0602 by Arcelia Don RN  Outcome: No Change     Patient sleeping for majority of shift, incontinent of b&b, somnolent, held CIWA parameter Ativan after speaking with MD about patients current state, able to swallow meds when aroused awake and individually given. Has VPM and long arm sit.     Rowan Louie RN  October 25, 2019 1:57 PM

## 2019-10-25 NOTE — PROGRESS NOTES
SP[facility]UAL [facility] Progress Note  [facility] 65    Initiated follow-up due to request.  Pt was awake, but stated that he was drifting in and out of sleep and needed to use the restroom soon.  Pt requested SH follow-up next week.  SH will continue to follow as needed.      Edilson Siu  Chaplain Resident

## 2019-10-25 NOTE — PROGRESS NOTES
DATE & TIME: 10/25/19 7161-6549  Cognitive Concerns/ Orientation: oriented to self only, lethargic   BEHAVIOR & AGGRESSION TOOL COLOR: Green  CIWA SCORE: 9 (tremors), per MD ok to not give Ativan as pt is already sedated          ABNL VS/O2: VSS on RA   MOBILITY: Assist of 2, somnolent for most of shift  PAIN MANAGMENT: MILA  DIET: Regular, fluid restriction 1000 mL  BOWEL/BLADDER: Incontinent of bladder, placed condom catheter  ABNL LAB/BG: None  DRAIN/DEVICES: PIV L arm saline locked  TELEMETRY RHYTHM: SD with BBB  SKIN: Cuts/abrasions/bruises on arms and legs  TESTS/PROCEDURES: None  D/C DAY/GOALS/PLACE: Discharge pending  OTHER IMPORTANT INFO: NAVEED, to see chem dep

## 2019-10-25 NOTE — PLAN OF CARE
DATE & TIME: 10/25/19 9797-7266   Cognitive Concerns/ Orientation : A&O to self only   BEHAVIOR & AGGRESSION TOOL COLOR: Green  CIWA SCORE: 11, 13   ABNL VS/O2: VSS on room air   MOBILITY: Assist of 2, did not get out of bed this shift, tremulous/weak  PAIN MANAGMENT: Denies pain  DIET: Regular, fluid restriction 1000 mL  BOWEL/BLADDER: Incontinent of bladder, no bm this shift  ABNL LAB/BG: None  DRAIN/DEVICES: PIV L arm saline locked  TELEMETRY RHYTHM: NSR with BBB  SKIN: Cuts/abrasions/bruises on arms and legs  TESTS/PROCEDURES: None  D/C DAY/GOALS/PLACE: Discharge pending  OTHER IMPORTANT INFO: Sitter and VPM at bedside; pt received 3 mg Ativan this shift per CIWA protocol

## 2019-10-25 NOTE — PROGRESS NOTES
DATE & TIME: 10/25/19 2231-8887    Cognitive Concerns/ Orientation : A&O to self only   BEHAVIOR & AGGRESSION TOOL COLOR: Green  CIWA SCORE: 9, 7           ABNL VS/O2: VSS on room air   MOBILITY: Assist of 2, somnolent for most of shift, did not get out of bed  PAIN MANAGMENT: MILA, sleeping for majority of shift   DIET: Regular, fluid restriction 1000 mL  BOWEL/BLADDER: Incontinent of bladder, no bm this shift  ABNL LAB/BG: None  DRAIN/DEVICES: PIV L arm saline locked  TELEMETRY RHYTHM: Sinus dysrhythmia with BBB  SKIN: Cuts/abrasions/bruises on arms and legs  TESTS/PROCEDURES: None  D/C DAY/GOALS/PLACE: Discharge pending  OTHER IMPORTANT INFO: Long arm sit and VPM    Rowan Louie RN

## 2019-10-25 NOTE — PROGRESS NOTES
MD Notification     Notified Person: MD     Notified Person Name: Dr. Lucas     Notification Date/Time: October 25, 2019 10:55 AM     Notification Interaction: paging     Purpose of Notification: Patient scored 9 on CIWA, primarily due to tremors, but is still lethargic from last dose of Ativan. Should wegive the Ativan per CIWA parameters or hold?     Orders Received: Dr. Lucas said to hold the dose of Ativan      Comments:      Rowan Louie RN

## 2019-10-25 NOTE — PLAN OF CARE
DATE & TIME: 10/24/19 2200     Cognitive Concerns/ Orientation : alert to self   BEHAVIOR & AGGRESSION TOOL COLOR: yellow, pt very restless  CIWA SCORE: 11 and 7   ABNL VS/O2: VSS  MOBILITY: A2, pt unable to get out of bed  PAIN MANAGMENT: denies pain  DIET: regular diet, 1000 ml fluid restriction  BOWEL/BLADDER: incontinent, BS of 900 ml straight cathed for 925  ABNL LAB/BG: NA  DRAIN/DEVICES: IV SL  TELEMETRY RHYTHM: NSR  SKIN: some bruising, puncture site on abdoment  TESTS/PROCEDURES: NA  D/C DAY/GOALS/PLACE: pt still going through withdrawal, very tremulous  OTHER IMPORTANT INFO: sitter at bedside

## 2019-10-26 LAB
ANION GAP SERPL CALCULATED.3IONS-SCNC: 3 MMOL/L (ref 3–14)
BUN SERPL-MCNC: 14 MG/DL (ref 7–30)
CALCIUM SERPL-MCNC: 8.2 MG/DL (ref 8.5–10.1)
CHLORIDE SERPL-SCNC: 103 MMOL/L (ref 94–109)
CO2 SERPL-SCNC: 28 MMOL/L (ref 20–32)
CREAT SERPL-MCNC: 0.99 MG/DL (ref 0.66–1.25)
ERYTHROCYTE [DISTWIDTH] IN BLOOD BY AUTOMATED COUNT: 15.8 % (ref 10–15)
GFR SERPL CREATININE-BSD FRML MDRD: 79 ML/MIN/{1.73_M2}
GLUCOSE SERPL-MCNC: 121 MG/DL (ref 70–99)
HCT VFR BLD AUTO: 30.8 % (ref 40–53)
HGB BLD-MCNC: 10.1 G/DL (ref 13.3–17.7)
LACTATE BLD-SCNC: 1.7 MMOL/L (ref 0.7–2)
MAGNESIUM SERPL-MCNC: 1.3 MG/DL (ref 1.6–2.3)
MAGNESIUM SERPL-MCNC: 2.2 MG/DL (ref 1.6–2.3)
MCH RBC QN AUTO: 32.4 PG (ref 26.5–33)
MCHC RBC AUTO-ENTMCNC: 32.8 G/DL (ref 31.5–36.5)
MCV RBC AUTO: 99 FL (ref 78–100)
PHOSPHATE SERPL-MCNC: 3.7 MG/DL (ref 2.5–4.5)
PLATELET # BLD AUTO: 104 10E9/L (ref 150–450)
POTASSIUM SERPL-SCNC: 3.2 MMOL/L (ref 3.4–5.3)
POTASSIUM SERPL-SCNC: 4.1 MMOL/L (ref 3.4–5.3)
RBC # BLD AUTO: 3.12 10E12/L (ref 4.4–5.9)
SODIUM SERPL-SCNC: 134 MMOL/L (ref 133–144)
WBC # BLD AUTO: 3.6 10E9/L (ref 4–11)

## 2019-10-26 PROCEDURE — 83605 ASSAY OF LACTIC ACID: CPT | Performed by: STUDENT IN AN ORGANIZED HEALTH CARE EDUCATION/TRAINING PROGRAM

## 2019-10-26 PROCEDURE — 12000000 ZZH R&B MED SURG/OB

## 2019-10-26 PROCEDURE — 84132 ASSAY OF SERUM POTASSIUM: CPT | Performed by: INTERNAL MEDICINE

## 2019-10-26 PROCEDURE — 80048 BASIC METABOLIC PNL TOTAL CA: CPT | Performed by: INTERNAL MEDICINE

## 2019-10-26 PROCEDURE — 25000128 H RX IP 250 OP 636: Performed by: INTERNAL MEDICINE

## 2019-10-26 PROCEDURE — 84100 ASSAY OF PHOSPHORUS: CPT | Performed by: INTERNAL MEDICINE

## 2019-10-26 PROCEDURE — 36415 COLL VENOUS BLD VENIPUNCTURE: CPT | Performed by: INTERNAL MEDICINE

## 2019-10-26 PROCEDURE — 85027 COMPLETE CBC AUTOMATED: CPT | Performed by: INTERNAL MEDICINE

## 2019-10-26 PROCEDURE — 83735 ASSAY OF MAGNESIUM: CPT | Performed by: INTERNAL MEDICINE

## 2019-10-26 PROCEDURE — 99232 SBSQ HOSP IP/OBS MODERATE 35: CPT | Performed by: INTERNAL MEDICINE

## 2019-10-26 PROCEDURE — 25000132 ZZH RX MED GY IP 250 OP 250 PS 637: Mod: GY | Performed by: STUDENT IN AN ORGANIZED HEALTH CARE EDUCATION/TRAINING PROGRAM

## 2019-10-26 PROCEDURE — 25000132 ZZH RX MED GY IP 250 OP 250 PS 637: Mod: GY | Performed by: INTERNAL MEDICINE

## 2019-10-26 PROCEDURE — 25000132 ZZH RX MED GY IP 250 OP 250 PS 637: Mod: GY | Performed by: PSYCHIATRY & NEUROLOGY

## 2019-10-26 PROCEDURE — 36415 COLL VENOUS BLD VENIPUNCTURE: CPT | Performed by: STUDENT IN AN ORGANIZED HEALTH CARE EDUCATION/TRAINING PROGRAM

## 2019-10-26 RX ORDER — POTASSIUM CHLORIDE 1500 MG/1
20-40 TABLET, EXTENDED RELEASE ORAL
Status: DISCONTINUED | OUTPATIENT
Start: 2019-10-26 | End: 2019-10-30 | Stop reason: HOSPADM

## 2019-10-26 RX ORDER — POTASSIUM CL/LIDO/0.9 % NACL 10MEQ/0.1L
10 INTRAVENOUS SOLUTION, PIGGYBACK (ML) INTRAVENOUS
Status: DISCONTINUED | OUTPATIENT
Start: 2019-10-26 | End: 2019-10-30 | Stop reason: HOSPADM

## 2019-10-26 RX ORDER — MAGNESIUM SULFATE HEPTAHYDRATE 40 MG/ML
4 INJECTION, SOLUTION INTRAVENOUS EVERY 4 HOURS PRN
Status: DISCONTINUED | OUTPATIENT
Start: 2019-10-26 | End: 2019-10-30 | Stop reason: HOSPADM

## 2019-10-26 RX ORDER — POTASSIUM CHLORIDE 29.8 MG/ML
20 INJECTION INTRAVENOUS
Status: DISCONTINUED | OUTPATIENT
Start: 2019-10-26 | End: 2019-10-26

## 2019-10-26 RX ORDER — SPIRONOLACTONE 25 MG/1
100 TABLET ORAL DAILY
Status: DISCONTINUED | OUTPATIENT
Start: 2019-10-27 | End: 2019-10-27

## 2019-10-26 RX ORDER — POTASSIUM CHLORIDE 7.45 MG/ML
10 INJECTION INTRAVENOUS
Status: DISCONTINUED | OUTPATIENT
Start: 2019-10-26 | End: 2019-10-30 | Stop reason: HOSPADM

## 2019-10-26 RX ORDER — SPIRONOLACTONE 25 MG/1
50 TABLET ORAL ONCE
Status: COMPLETED | OUTPATIENT
Start: 2019-10-26 | End: 2019-10-26

## 2019-10-26 RX ORDER — POTASSIUM CHLORIDE 1.5 G/1.58G
20-40 POWDER, FOR SOLUTION ORAL
Status: DISCONTINUED | OUTPATIENT
Start: 2019-10-26 | End: 2019-10-30 | Stop reason: HOSPADM

## 2019-10-26 RX ADMIN — SPIRONOLACTONE 50 MG: 25 TABLET ORAL at 09:17

## 2019-10-26 RX ADMIN — PROPRANOLOL HYDROCHLORIDE 10 MG: 10 TABLET ORAL at 20:36

## 2019-10-26 RX ADMIN — TRAZODONE HYDROCHLORIDE 100 MG: 100 TABLET ORAL at 20:36

## 2019-10-26 RX ADMIN — MAGNESIUM SULFATE HEPTAHYDRATE 4 G: 40 INJECTION, SOLUTION INTRAVENOUS at 12:28

## 2019-10-26 RX ADMIN — MULTIPLE VITAMINS W/ MINERALS TAB 1 TABLET: TAB at 09:18

## 2019-10-26 RX ADMIN — QUETIAPINE FUMARATE 50 MG: 50 TABLET ORAL at 20:37

## 2019-10-26 RX ADMIN — LORAZEPAM 1 MG: 1 TABLET ORAL at 09:30

## 2019-10-26 RX ADMIN — MIRTAZAPINE 15 MG: 15 TABLET, FILM COATED ORAL at 20:36

## 2019-10-26 RX ADMIN — POTASSIUM CHLORIDE 40 MEQ: 1500 TABLET, EXTENDED RELEASE ORAL at 10:48

## 2019-10-26 RX ADMIN — NICOTINE 1 PATCH: 21 PATCH, EXTENDED RELEASE TRANSDERMAL at 09:18

## 2019-10-26 RX ADMIN — FLUTICASONE FUROATE AND VILANTEROL TRIFENATATE 1 PUFF: 200; 25 POWDER RESPIRATORY (INHALATION) at 09:19

## 2019-10-26 RX ADMIN — POTASSIUM CHLORIDE 20 MEQ: 1500 TABLET, EXTENDED RELEASE ORAL at 13:18

## 2019-10-26 RX ADMIN — SPIRONOLACTONE 50 MG: 25 TABLET ORAL at 17:19

## 2019-10-26 RX ADMIN — FUROSEMIDE 40 MG: 40 TABLET ORAL at 09:17

## 2019-10-26 RX ADMIN — PROPRANOLOL HYDROCHLORIDE 10 MG: 10 TABLET ORAL at 09:17

## 2019-10-26 RX ADMIN — LORAZEPAM 1 MG: 1 TABLET ORAL at 13:18

## 2019-10-26 RX ADMIN — MICONAZOLE NITRATE: 20 POWDER TOPICAL at 10:29

## 2019-10-26 RX ADMIN — PANTOPRAZOLE SODIUM 40 MG: 40 TABLET, DELAYED RELEASE ORAL at 09:17

## 2019-10-26 RX ADMIN — DULOXETINE HYDROCHLORIDE 60 MG: 30 CAPSULE, DELAYED RELEASE ORAL at 09:17

## 2019-10-26 ASSESSMENT — ACTIVITIES OF DAILY LIVING (ADL)
ADLS_ACUITY_SCORE: 18
ADLS_ACUITY_SCORE: 16
ADLS_ACUITY_SCORE: 16
ADLS_ACUITY_SCORE: 18

## 2019-10-26 NOTE — PROGRESS NOTES
DATE & TIME: 10/26/19  5821-2451  Cognitive Concerns/ Orientation: AOx4, forgetful  BEHAVIOR & AGGRESSION TOOL COLOR: Green  CIWA SCORE: 5 (tremors)   ABNL VS/O2: VSS on RA   MOBILITY: Assist of 1-2 with walker and GB  PAIN MANAGMENT:  denies  DIET: Regular, fluid restriction 1000 mL  BOWEL/BLADDER: occasionally incontinent of bladder  ABNL LAB/BG: K+ and Mg+ rechecked, WNL  DRAIN/DEVICES: IV SL  TELEMETRY RHYTHM: SD with BBB  SKIN: Cuts/abrasions/bruises on arms and legs, powder applied to groin  TESTS/PROCEDURES: None  D/C DAY/GOALS/PLACE: Discharge pending  OTHER IMPORTANT INFO: VPM, to see chem dep

## 2019-10-26 NOTE — PLAN OF CARE
DATE & TIME: 10/26/19  1900 to 0700  Cognitive Concerns/ Orientation: oriented to self only,  Alert, sleeping between cares  BEHAVIOR & AGGRESSION TOOL COLOR: Green  CIWA SCORE: 9 (tremors)  Tremors are baseline and confusion baseline         ABNL VS/O2: VSS on RA   MOBILITY: Assist of 2,  sleeping between cares   PAIN MANAGMENT:  denies  DIET: Regular, fluid restriction 1000 mL  BOWEL/BLADDER: Incontinent of bladder, placed condom catheter  ABNL LAB/BG: None  DRAIN/DEVICES: PIV L arm saline locked  TELEMETRY RHYTHM: SD with BBB  SKIN: Cuts/abrasions/bruises on arms and legs  TESTS/PROCEDURES: None  D/C DAY/GOALS/PLACE: Discharge pending  OTHER IMPORTANT INFO: VPM, to see chem dep, Lactic triggered 0000 10/26/19  BP was 79/45 HR 70 recheck was 95/57 HR 58 Lactic came back at 1.7

## 2019-10-26 NOTE — PROGRESS NOTES
[facility]  Hospitalist Progress Note  Dhaval Snyder MD  10/26/2019    Assessment & Plan   Jim Richard is a 64 year old male admitted on 10/22/2019. He presents with suicidal ideation and intentional ingestion while intoxicated. Patient has history of alcoholic liver disease, ulcers, esophageal varices, CAD s/p CABG with residual left leg swelling, hypertension, hyperlipidemia, peripheral vascular disease, tobacco use, subdural hematoma, and chronic low back pain who presented to the ED with suicidal ideation and overdose on his blood pressure medications.      Depression  Suicidal ideation - resolved  Anxiety and depression  Intentional Ingestion  Presents with suicidal ideations in setting with significant alcohol abuse, and ingestion of twice the dosing of his blood pressure medications.  -Appreciate psych evaluation  -no longer suicidal and says it was from being intoxicated  -continue home psych medications per psychiatry     Alcohol Dependence   ALcohol withdrawal with delirium tremens  Alcoholic liver disease  Hepatic encephalopathy  Ascites  EtOH level of 0.40 on admission. Status post paracentesis 10/23 AM with 4 liters removed.  - Appreciate psych evaluation  - Continue CIWA protocol and noticing worsening confusion with some delirium tremens, now improve has some tremors this morning, CIWA around 6-8  - telemetry, thiamine/folate/MV ordered   - Fluid restriction decreased to 1 L this stay given significant ascites  - continue home lactulose  - He is on 40 mg of lasix and 50 of spironolactone  At this time, will increase aldactone to 100 mg daily.   -CD consult evaluation pending at this time, but recommend an evaluation to be scheduled with [facility], [phone].  (last treatment was last year and was sober for 6 months)  -will need some recovery to see if he can go directly to CD treatment vs TCU     Essential hypertension  -continue on  propranolol, lasix, and spironolactone  -some of his hypertension is likely secondary to withdrawal     COPD (chronic obstructive pulmonary disease) (H)  JANIS on CPAP  -Stable with no evidence of exacerbation.      PUD  Anemia of chronic Disease  History of duodenal ulcer and esophageal varices:  Last admission was evaluated by GI on 9/25, upper GI endoscopy without evidence of bleeding   - Continue Protonix 40 mg p.o. daily  - Etoh cessation encouraged  - Follow up in GI clinic as directed      Tobacco abuse:  pack a day smoker  -continue 21 mg patch daily  -Encouraged cessation during stay     Depression and Anxiety:  -continue PTA cymbalta, remeron, trazodone, seroquel     Pancytopenia:  -likely secondary to chronic alcoholism (bone marrow suppression) and gi bleed  -CBC stable on 10/24, continue to follow as outpatient to make sure returns to normal off alcohol     Diet: Fluid restriction 1000 ML FLUID  Regular Diet Adult    DVT Prophylaxis: Pneumatic Compression Devices  Leger Catheter: not present  Code Status: DNR/DNI          Disposition Plan     Expected discharge: 2 - 3 days,      Increase aldactone to 100 mg daily, I see he has paracentesis but no fluid was send for analysis.  Continue alcohol withdrawal protocol.  PT and OT  Ambulate patient today   Replace electrolytes potassium and magnesium as per protocol.     Interval History   Has some tremors, think his abdomen is getting distended again. Denies any fever, chills, chest pain, nausea or vomiting at this time.     No other significant event overnight.     -Data reviewed today: I reviewed all new labs and imaging over the last 24 hours. I personally reviewed no images or EKG's today.    Physical Exam   Heart Rate: 60, Blood pressure 112/62, pulse 71, temperature 98.1  F (36.7  C), temperature source Oral, resp. rate 16, weight 82.2 kg (181 lb 3.5 oz), SpO2 92 %.  Vitals:    10/24/19 0347 10/26/19 0600   Weight: 83.7 kg (184 lb 8.4 oz) 82.2 kg (181 lb  3.5 oz)     Vital Signs with Ranges  Temp:  [97.5  F (36.4  C)-98.1  F (36.7  C)] 98.1  F (36.7  C)  Pulse:  [57-71] 71  Heart Rate:  [60] 60  Resp:  [14-16] 16  BP: ()/(45-79) 112/62  SpO2:  [92 %-95 %] 92 %  I/O's Last 24 hours  I/O last 3 completed shifts:  In: 120 [P.O.:120]  Out: -     Constitutional:  no apparent distress, arouses, confused  Respiratory: Clear to auscultation bilaterally, no crackles or wheezing  Cardiovascular: Regular rate and rhythm, normal S1 and S2, and no murmur noted  GI: Normal bowel sounds, soft, diffuse tenderness, ascites positive.   Skin/Integumen: No rashes, no cyanosis, no edema  Other:      Medications   All medications were reviewed.      DULoxetine  60 mg Oral Daily     fluticasone-vilanterol  1 puff Inhalation Daily     furosemide  40 mg Oral Daily     mirtazapine  15 mg Oral At Bedtime     multivitamin w/minerals  1 tablet Oral Daily     nicotine   Transdermal Q8H     nicotine   Transdermal Daily     pantoprazole  40 mg Oral QAM AC     propranolol  10 mg Oral BID     QUEtiapine  50 mg Oral At Bedtime     sodium chloride (PF)  3 mL Intracatheter Q8H     spironolactone  50 mg Oral Daily     traZODone  100 mg Oral At Bedtime        Data   Recent Labs   Lab 10/26/19  0826 10/24/19  0839 10/23/19  0936  10/22/19  2239  10/22/19  1549   WBC 3.6* 3.0* 3.1*   < >  --   --  5.6   HGB 10.1* 8.4* 8.2*   < >  --   --  10.1*   MCV 99 100 98   < >  --   --  98   * 63* 62*   < >  --   --  138*   INR  --   --   --   --   --   --  1.13     --  137  --  139   < > 139   POTASSIUM 3.2*  --  3.6  --  3.6   < > 3.7   CHLORIDE 103  --  107  --  107   < > 107   CO2 28  --  23  --  24   < > 22   BUN 14  --  17  --  18   < > 18   CR 0.99  --  0.80  --  0.86   < > 0.86   ANIONGAP 3  --  7  --  8   < > 10   KEV 8.2*  --  7.9*  --  7.4*   < > 7.8*   *  --  83  --  101*   < > 72   ALBUMIN  --   --  2.5*  --  2.6*  --  2.7*   PROTTOTAL  --   --  6.4*  --  6.5*  --  7.0    BILITOTAL  --   --  1.9*  --  1.2  --  1.2   ALKPHOS  --   --  266*  --  276*  --  303*   ALT  --   --  32  --  37  --  41   AST  --   --  90*  --  107*  --  116*    < > = values in this interval not displayed.       No results found for this or any previous visit (from the past 24 hour(s)).    Dhaval Snyder MD  Hospitalist/[facility].

## 2019-10-26 NOTE — PROGRESS NOTES
DATE & TIME: 10/26/19  0625-6267  Cognitive Concerns/ Orientation: alert, oriented to self and location, sleeping between cares  BEHAVIOR & AGGRESSION TOOL COLOR: Green  CIWA SCORE: 11, 9. 1mg x2 of Ativan given per protocol, relief after each dose   ABNL VS/O2: VSS on RA   MOBILITY: Assist of 2  PAIN MANAGMENT:  denies  DIET: Regular, fluid restriction 1000 mL  BOWEL/BLADDER: Incontinent of bladder, placed condom catheter  ABNL LAB/BG: Low magnesium and potassium, replaced both, rechecks ordered. Potassium recheck at 1630 and magnesium at 1700  DRAIN/DEVICES: PIV L arm discontinued, PIV in R arm saline locked  TELEMETRY RHYTHM: SD with BBB  SKIN: Cuts/abrasions/bruises on arms and legs, complaints of skin irritation in groin  TESTS/PROCEDURES: None  D/C DAY/GOALS/PLACE: Discharge pending  OTHER IMPORTANT INFO: NAVEED Louie RN  October 26, 2019  2:16 PM

## 2019-10-27 ENCOUNTER — APPOINTMENT (OUTPATIENT)
Dept: OCCUPATIONAL THERAPY | Facility: CLINIC | Age: 64
DRG: 897 | End: 2019-10-27
Attending: INTERNAL MEDICINE
Payer: MEDICARE

## 2019-10-27 LAB
ALBUMIN SERPL-MCNC: 2.4 G/DL (ref 3.4–5)
ANION GAP SERPL CALCULATED.3IONS-SCNC: 5 MMOL/L (ref 3–14)
BASOPHILS # BLD AUTO: 0 10E9/L (ref 0–0.2)
BASOPHILS NFR BLD AUTO: 0.5 %
BUN SERPL-MCNC: 22 MG/DL (ref 7–30)
CALCIUM SERPL-MCNC: 8.3 MG/DL (ref 8.5–10.1)
CHLORIDE SERPL-SCNC: 104 MMOL/L (ref 94–109)
CO2 SERPL-SCNC: 27 MMOL/L (ref 20–32)
CREAT SERPL-MCNC: 1.24 MG/DL (ref 0.66–1.25)
DIFFERENTIAL METHOD BLD: ABNORMAL
EOSINOPHIL # BLD AUTO: 0 10E9/L (ref 0–0.7)
EOSINOPHIL NFR BLD AUTO: 1 %
ERYTHROCYTE [DISTWIDTH] IN BLOOD BY AUTOMATED COUNT: 16.1 % (ref 10–15)
GFR SERPL CREATININE-BSD FRML MDRD: 60 ML/MIN/{1.73_M2}
GLUCOSE SERPL-MCNC: 92 MG/DL (ref 70–99)
HCT VFR BLD AUTO: 30.6 % (ref 40–53)
HGB BLD-MCNC: 10.2 G/DL (ref 13.3–17.7)
IMM GRANULOCYTES # BLD: 0 10E9/L (ref 0–0.4)
IMM GRANULOCYTES NFR BLD: 0 %
LYMPHOCYTES # BLD AUTO: 0.7 10E9/L (ref 0.8–5.3)
LYMPHOCYTES NFR BLD AUTO: 16.7 %
MAGNESIUM SERPL-MCNC: 2 MG/DL (ref 1.6–2.3)
MCH RBC QN AUTO: 33.1 PG (ref 26.5–33)
MCHC RBC AUTO-ENTMCNC: 33.3 G/DL (ref 31.5–36.5)
MCV RBC AUTO: 99 FL (ref 78–100)
MONOCYTES # BLD AUTO: 0.7 10E9/L (ref 0–1.3)
MONOCYTES NFR BLD AUTO: 17.4 %
NEUTROPHILS # BLD AUTO: 2.6 10E9/L (ref 1.6–8.3)
NEUTROPHILS NFR BLD AUTO: 64.4 %
NRBC # BLD AUTO: 0 10*3/UL
NRBC BLD AUTO-RTO: 0 /100
PHOSPHATE SERPL-MCNC: 3.3 MG/DL (ref 2.5–4.5)
PLATELET # BLD AUTO: 96 10E9/L (ref 150–450)
POTASSIUM SERPL-SCNC: 3.6 MMOL/L (ref 3.4–5.3)
RBC # BLD AUTO: 3.08 10E12/L (ref 4.4–5.9)
SODIUM SERPL-SCNC: 136 MMOL/L (ref 133–144)
WBC # BLD AUTO: 4.1 10E9/L (ref 4–11)

## 2019-10-27 PROCEDURE — P9041 ALBUMIN (HUMAN),5%, 50ML: HCPCS | Performed by: INTERNAL MEDICINE

## 2019-10-27 PROCEDURE — 12000000 ZZH R&B MED SURG/OB

## 2019-10-27 PROCEDURE — 36415 COLL VENOUS BLD VENIPUNCTURE: CPT | Performed by: INTERNAL MEDICINE

## 2019-10-27 PROCEDURE — 99232 SBSQ HOSP IP/OBS MODERATE 35: CPT | Performed by: INTERNAL MEDICINE

## 2019-10-27 PROCEDURE — 25000132 ZZH RX MED GY IP 250 OP 250 PS 637: Mod: GY | Performed by: INTERNAL MEDICINE

## 2019-10-27 PROCEDURE — 80069 RENAL FUNCTION PANEL: CPT | Performed by: INTERNAL MEDICINE

## 2019-10-27 PROCEDURE — 25000132 ZZH RX MED GY IP 250 OP 250 PS 637: Mod: GY | Performed by: STUDENT IN AN ORGANIZED HEALTH CARE EDUCATION/TRAINING PROGRAM

## 2019-10-27 PROCEDURE — 25000128 H RX IP 250 OP 636: Performed by: INTERNAL MEDICINE

## 2019-10-27 PROCEDURE — 85025 COMPLETE CBC W/AUTO DIFF WBC: CPT | Performed by: INTERNAL MEDICINE

## 2019-10-27 PROCEDURE — 83735 ASSAY OF MAGNESIUM: CPT | Performed by: INTERNAL MEDICINE

## 2019-10-27 PROCEDURE — 97165 OT EVAL LOW COMPLEX 30 MIN: CPT | Mod: GO | Performed by: OCCUPATIONAL THERAPIST

## 2019-10-27 PROCEDURE — 97535 SELF CARE MNGMENT TRAINING: CPT | Mod: GO | Performed by: OCCUPATIONAL THERAPIST

## 2019-10-27 PROCEDURE — H0001 ALCOHOL AND/OR DRUG ASSESS: HCPCS | Mod: HF

## 2019-10-27 PROCEDURE — 25000132 ZZH RX MED GY IP 250 OP 250 PS 637: Mod: GY | Performed by: PSYCHIATRY & NEUROLOGY

## 2019-10-27 RX ORDER — ALBUMIN, HUMAN INJ 5% 5 %
25 SOLUTION INTRAVENOUS ONCE
Status: COMPLETED | OUTPATIENT
Start: 2019-10-27 | End: 2019-10-27

## 2019-10-27 RX ORDER — SPIRONOLACTONE 25 MG/1
50 TABLET ORAL DAILY
Status: DISCONTINUED | OUTPATIENT
Start: 2019-10-30 | End: 2019-10-28

## 2019-10-27 RX ORDER — FUROSEMIDE 40 MG
40 TABLET ORAL DAILY
Status: DISCONTINUED | OUTPATIENT
Start: 2019-10-31 | End: 2019-10-28

## 2019-10-27 RX ADMIN — ALBUMIN HUMAN 25 G: 0.05 INJECTION, SOLUTION INTRAVENOUS at 15:11

## 2019-10-27 RX ADMIN — FLUTICASONE FUROATE AND VILANTEROL TRIFENATATE 1 PUFF: 200; 25 POWDER RESPIRATORY (INHALATION) at 09:01

## 2019-10-27 RX ADMIN — SPIRONOLACTONE 100 MG: 25 TABLET ORAL at 08:54

## 2019-10-27 RX ADMIN — DULOXETINE HYDROCHLORIDE 60 MG: 30 CAPSULE, DELAYED RELEASE ORAL at 08:55

## 2019-10-27 RX ADMIN — PANTOPRAZOLE SODIUM 40 MG: 40 TABLET, DELAYED RELEASE ORAL at 08:54

## 2019-10-27 RX ADMIN — QUETIAPINE FUMARATE 50 MG: 50 TABLET ORAL at 21:24

## 2019-10-27 RX ADMIN — NICOTINE 1 PATCH: 21 PATCH, EXTENDED RELEASE TRANSDERMAL at 09:01

## 2019-10-27 RX ADMIN — TRAZODONE HYDROCHLORIDE 100 MG: 100 TABLET ORAL at 21:24

## 2019-10-27 RX ADMIN — FUROSEMIDE 40 MG: 40 TABLET ORAL at 08:55

## 2019-10-27 RX ADMIN — PROPRANOLOL HYDROCHLORIDE 10 MG: 10 TABLET ORAL at 20:00

## 2019-10-27 RX ADMIN — MULTIPLE VITAMINS W/ MINERALS TAB 1 TABLET: TAB at 08:54

## 2019-10-27 RX ADMIN — MIRTAZAPINE 15 MG: 15 TABLET, FILM COATED ORAL at 21:24

## 2019-10-27 ASSESSMENT — ACTIVITIES OF DAILY LIVING (ADL)
ADLS_ACUITY_SCORE: 18
ADLS_ACUITY_SCORE: 20
ADLS_ACUITY_SCORE: 19
PREVIOUS_RESPONSIBILITIES: MEAL PREP;HOUSEKEEPING;LAUNDRY;MEDICATION MANAGEMENT;DRIVING
ADLS_ACUITY_SCORE: 19
ADLS_ACUITY_SCORE: 18
ADLS_ACUITY_SCORE: 18

## 2019-10-27 NOTE — PLAN OF CARE
Discharge Planner OT     OT:Evaluation completed and treatment initiated. Pt.admitted w/suicidal ideation and intentional ingestion. Patient has history of alcoholic liver disease, ulcers, esophageal varices, CAD, hypertension, hyperlipidemia, peripheral vascular disease, tobacco use, subdural hematoma, and chronic low back pain  resides in a town home w/ roommate;pt. indep./mod.indep. w/ I/ADl's, several recent hospitalizations.  Patient plan for discharge: Not stated  Current status: Pt.overall CGA for supine-sit;pt. completed sit-stand w/ CGA/WW/vc's for safety/hand placement;Pt. ambulated to bathroom w/ overall CGA, vc's;completed toileting/standing w/ CGA;pt. tolerated standing at sink for a few minutes to complete grooming/hyg.tasks w/ SBA-CGA;Completed commode/toilet transfer w/ CGA/grab bar;Pt. worked on dressing task, seated;Pt. able to doff/jarocho B socks w/ overall SBA, increased effort--would benefit from AE trial(has chronic LBP);In order to assist w/  increased strength/endurance for ADL's, pt. ambulated in hallway X 250 feet w/ CGA, tolerated well, unsteady. Pt. completed stand-sit w/ CGA/vc's, up in chair upon departure. OT will see for ADL's, activity tolerance and general strengthening(no acute PT needs-defer to TCU).  Barriers to return to prior living situation: weakness, impaired cognition, current level of assist  Recommendations for discharge: TCU  Rationale for recommendations: Pt. would benefit from continued skilled OT intervention to maximize safety/indep. W/ I/ADL's.       Entered by: Ena Barakat 10/27/2019 4:02 PM

## 2019-10-27 NOTE — PLAN OF CARE
Discharge Planner PT   Patient plan for discharge: Defer to OT  Current status: Evaluation orders received. Pt up with CGA and use of walker. OT completed eval first. OT reports pt will benefit from a TCU stay. OT to continue to see the pt during hospital stay for functional activity tolerance, general strengthening, and ADL training. No acute skilled PT needs identified at this time. OT to re-order should PT needs arise.  Barriers to return to prior living situation: Defer to OT  Recommendations for discharge: Defer to OT  Rationale for recommendations: Defer PT eval to TCU setting. No acute skilled PT needs at this time.        Entered by: Ena Song 10/27/2019 11:36 AM

## 2019-10-27 NOTE — PLAN OF CARE
DATE & TIME: Day shift, 10/27/19   Cognitive Concerns/ Orientation : alert and oriented x4   BEHAVIOR & AGGRESSION TOOL COLOR: GREEN   CIWA SCORE: 2 and 2. No ativan given.   ABNL VS/O2: 93/57, HR 54, other VSS on RA  MOBILITY: SBAx1 walker and GB  PAIN MANAGMENT: none needed  DIET: Regular diet  BOWEL/BLADDER: continent, no BM  ABNL LAB/BG: none  DRAIN/DEVICES: IV SL   TELEMETRY RHYTHM: NSR with BBB and occ PACs  SKIN: pale, bruised, scabs  TESTS/PROCEDURES: none  D/C DAY/GOALS/PLACE: Waiting for CD assessment, working with therapies.   OTHER IMPORTANT INFO: Patient very weak and sleepy today.

## 2019-10-27 NOTE — PROGRESS NOTES
[facility]  Hospitalist Progress Note  Dhaval Snyder MD  10/27/2019    Assessment & Plan   Jim Richard is a 64 year old male admitted on 10/22/2019. He presents with suicidal ideation and intentional ingestion while intoxicated. Patient has history of alcoholic liver disease, ulcers, esophageal varices, CAD s/p CABG with residual left leg swelling, hypertension, hyperlipidemia, peripheral vascular disease, tobacco use, subdural hematoma, and chronic low back pain who presented to the ED with suicidal ideation and overdose on his blood pressure medications.      Depression  Suicidal ideation - resolved  Anxiety and depression  Intentional Ingestion  Presents with suicidal ideations in setting with significant alcohol abuse, and ingestion of twice the dosing of his blood pressure medications.  -Appreciate psych evaluation  -no longer suicidal and says it was from being intoxicated  -continue home psych medications per psychiatry     Alcohol Dependence   ALcohol withdrawal with delirium tremens  Alcoholic liver disease  Hepatic encephalopathy  Ascites  EtOH level of 0.40 on admission. Status post paracentesis 10/23 AM with 4 liters removed.  - Appreciate psych evaluation  - Continue CIWA protocol and noticing worsening confusion with some delirium tremens, now improve has some tremors this morning, CIWA around 6-8  - telemetry, thiamine/folate/MV ordered   - Fluid restriction decreased to 1 L this stay given significant ascites  - continue home lactulose  - He is on 40 mg of lasix and 50 of spironolactone I did increase his Spironolactone to 100 mg on 10/26/2019 but his creatinine increased so we will hold Lasix and spironolactone tomorrow as he already got the dose today.  And will decrease the dose of spironolactone back to 50 mg.  -CD consult evaluation pending at this time, but recommend an evaluation to be scheduled with [facility], [phone].   (last treatment was last year and was sober for 6 months)  -will need some recovery to see if he can go directly to CD treatment vs TCU     Essential hypertension  -continue on propranolol, lasix, and spironolactone  -some of his hypertension is likely secondary to withdrawal  Blood pressure on lower side this morning, most likely because of increased dose of spinal lactone will cut down the dose to 50.     COPD (chronic obstructive pulmonary disease) (H)  JANIS on CPAP  -Stable with no evidence of exacerbation.      PUD  Anemia of chronic Disease  History of duodenal ulcer and esophageal varices:  Last admission was evaluated by GI on 9/25, upper GI endoscopy without evidence of bleeding   - Continue Protonix 40 mg p.o. daily  - Etoh cessation encouraged  - Follow up in GI clinic as directed      Tobacco abuse:  pack a day smoker  -continue 21 mg patch daily  -Encouraged cessation during stay     Depression and Anxiety:  -continue PTA cymbalta, remeron, trazodone, seroquel     Pancytopenia:  -likely secondary to chronic alcoholism (bone marrow suppression) and gi bleed  -CBC stable on 10/24, continue to follow as outpatient to make sure returns to normal off alcohol     Chronic kidney disease stage II- III  Patient baseline creatinine between 0.80-1.0, creatinine slightly increased to 1.24 this morning.  Most likely because of increased dose of a spinal lactone and slightly low blood pressure this morning.  He already got his Lasix and spinal lactone this morning.  We will hold it for day or 2 of his diuretics, given dose of IV albumin 5%.  Follow his kidney function.  It may slightly get worse tomorrow but with holding the diuretic will come down.  Avoid hypotension.     Diet: Fluid restriction 1000 ML FLUID  Regular Diet Adult    DVT Prophylaxis: Pneumatic Compression Devices  Leger Catheter: not present  Code Status: DNR/DNI          Disposition Plan     Expected discharge: 1-2 days, once stable      PT OT to see  him today.  Hold Aldactone and Lasix for couple of days.  Give a dose of IV albumin 5% today      Interval History   Feeling better this morning, good appetite eating his breakfast.  Denies any chest pain fever chills nausea vomiting has some mild tremors which are improving.  Erskine lightheaded when get up.  Not very active did not walk much yesterday.    No other significant event overnight.     -Data reviewed today: I reviewed all new labs and imaging over the last 24 hours. I personally reviewed no images or EKG's today.    Physical Exam   Heart Rate: 58, Blood pressure 98/58, pulse 58, temperature 98.1  F (36.7  C), temperature source Oral, resp. rate 14, weight 82.2 kg (181 lb 3.5 oz), SpO2 92 %.  Vitals:    10/24/19 0347 10/26/19 0600   Weight: 83.7 kg (184 lb 8.4 oz) 82.2 kg (181 lb 3.5 oz)     Vital Signs with Ranges  Temp:  [98.1  F (36.7  C)-98.4  F (36.9  C)] 98.1  F (36.7  C)  Pulse:  [54-71] 58  Heart Rate:  [58-64] 58  Resp:  [14-20] 14  BP: ()/(57-76) 98/58  SpO2:  [92 %-98 %] 92 %  I/O's Last 24 hours  I/O last 3 completed shifts:  In: 480 [P.O.:480]  Out: 1 [Stool:1]    Constitutional:  no apparent distress, arouses, confused  Respiratory: Clear to auscultation bilaterally, no crackles or wheezing  Cardiovascular: Regular rate and rhythm, normal S1 and S2, and no murmur noted  GI: Normal bowel sounds, soft, mild tender, ascites positive.   Skin/Integumen: No rashes, no cyanosis, no edema  Other:      Medications   All medications were reviewed.      albumin human  25 g Intravenous Once     DULoxetine  60 mg Oral Daily     fluticasone-vilanterol  1 puff Inhalation Daily     [START ON 10/31/2019] furosemide  40 mg Oral Daily     mirtazapine  15 mg Oral At Bedtime     multivitamin w/minerals  1 tablet Oral Daily     nicotine   Transdermal Q8H     nicotine   Transdermal Daily     pantoprazole  40 mg Oral QAM AC     propranolol  10 mg Oral BID     QUEtiapine  50 mg Oral At Bedtime     sodium  chloride (PF)  3 mL Intracatheter Q8H     [START ON 10/30/2019] spironolactone  50 mg Oral Daily     traZODone  100 mg Oral At Bedtime        Data   Recent Labs   Lab 10/27/19  0925 10/26/19  1717 10/26/19  0826 10/24/19  0839 10/23/19  0936  10/22/19  2239  10/22/19  1549   WBC 4.1  --  3.6* 3.0* 3.1*   < >  --   --  5.6   HGB 10.2*  --  10.1* 8.4* 8.2*   < >  --   --  10.1*   MCV 99  --  99 100 98   < >  --   --  98   PLT 96*  --  104* 63* 62*   < >  --   --  138*   INR  --   --   --   --   --   --   --   --  1.13     --  134  --  137  --  139   < > 139   POTASSIUM 3.6 4.1 3.2*  --  3.6  --  3.6   < > 3.7   CHLORIDE 104  --  103  --  107  --  107   < > 107   CO2 27  --  28  --  23  --  24   < > 22   BUN 22  --  14  --  17  --  18   < > 18   CR 1.24  --  0.99  --  0.80  --  0.86   < > 0.86   ANIONGAP 5  --  3  --  7  --  8   < > 10   KEV 8.3*  --  8.2*  --  7.9*  --  7.4*   < > 7.8*   GLC 92  --  121*  --  83  --  101*   < > 72   ALBUMIN 2.4*  --   --   --  2.5*  --  2.6*  --  2.7*   PROTTOTAL  --   --   --   --  6.4*  --  6.5*  --  7.0   BILITOTAL  --   --   --   --  1.9*  --  1.2  --  1.2   ALKPHOS  --   --   --   --  266*  --  276*  --  303*   ALT  --   --   --   --  32  --  37  --  41   AST  --   --   --   --  90*  --  107*  --  116*    < > = values in this interval not displayed.       No results found for this or any previous visit (from the past 24 hour(s)).    Dhaval Snyder MD  Hospitalist/[facility] Medicine.

## 2019-10-27 NOTE — PROGRESS NOTES
"Rule 25 Assessment  Background Information   1. Date of Assessment Request  2. Date of Assessment  10/27/19   3. Date Service Authorized     4.   AMADO Hammer, SYLVIE     5.  Phone Number   [phone]   6. Referent  [facility] 7. Assessment Site  [facility]     8. Client Name   Jim Richard 9. Date of Birth  1955 Age  64 year old 10. Gender  male  11. PMI/ Insurance No.  [id number]   12. Client's Primary Language:  English 13. Do you require special accommodations, such as an  or assistance with written material? No   14. Current Address: [address] [place] [address]   15. Client Phone Numbers: [phone] (home) none (work)     16. Tell me what has happened to bring you here today.    Per EMR ED admission note on 10/22/19:  \"  History      Chief Complaint:  Ingestion and Suicidal     HPI   Jim Richard is a 64 year old male with history of substance abuse, depression, anxiety, esophageal varices with bleeding, who presents with intentional ingestion and suicidal ideation. Per RN report, the patient voiced that he wants to die. He reported that he took his anti-anxiety meds, blood pressure meds, and drank alcohol. He also was \"tapped\" about 3 weeks ago and he feels that his abdomen is very distended. Here, the patient endorses that he took 2 x hydrochlorothiazide, 2 x spironolactone, and last drank this morning. He voices that he drinks \"7.5\" oz/drinks a day. He has been through withdrawal before with no prior seizures, and he is not withdrawing here, per his report. He denies any vomiting or any other drug ingestion. He endorses baseline leg swelling, so this is not new today\".          17. Have you had other rule 25 assessments?     Yes. When, Where, and What circumstances: see above, per EMR CD assessment on 12/14/16 through [facility].     DIMENSION I - Acute Intoxication /Withdrawal Potential   1. Chemical use " "most recent 12 months outside a facility and other significant use history (client self-report)              X = Primary Drug Used   Age of First Use Most Recent Pattern of Use and Duration   Need enough information to show pattern (both frequency and amounts) and to show tolerance for each chemical that has a diagnosis   Date of last use and time, if needed   Withdrawal Potential? Requiring special care Method of use  (oral, smoked, snort, IV, etc)     X Alcohol     15 Per patient: pint daily.    Per EMR ED admission note on 10/21/19:  He voices that he drinks \"7.5\" oz/drinks a day.     Per EHR ED admission note on 9/25/19:  Jim Richard is a 64 year old male who is six days sober and has a history of GI ulcers,and  esophageal varices who presents to the emergency department today via EMS for evaluation of generalized weakness. The patient reports he had abstained from alcohol use for three week prior to drinking for one day on 09/19/2019.     Per EMR CD assessment on 12/14/16:  Past year      1) Tolerance:     Liter of vodka; all day long; 10-15 times     2) Withdrawal:     Shakes; sweats; increased depression and anxiety;     3) Using more than planned:      50%;   4) Sincere desire to cut down/unsuccessful efforts:     Two treatments this past y ear and going AA;   5) Spending a lot of time using/recovering from using:   All day when drink;   Two days to recover  6) a)recurrent  failure to fulfill obligations at work, school or home:     Can't drive the next day due to drinking        b) persistent, recurrent social or interpersonal problems caused by use:     Son upset about drinking         c) given up/reduced/ neglected activities: social, occupational, or recreational activities :      driving because of drinking       d) recurrent use in situation where physically hazardous:       7) Use knowing it makes a mental/physical condition worse:     Knows that mi meds don't work if driking since 2011; black " out almost every time;     8) Cravings (strong urge/desire to use the substance, may be more intense when around certain persons, places or things):     5/10    Last Use Date and time of last use in the past 30 days and description of use in last 30 days:     10/22/19, pint, morning Currently admitted to [facility] oral      Marijuana/  Hashish   17 Pt denied use.     Per EMR CD assessment on 12/14/16:  20-25     1) Tolerance:   1/8-1/4  2) Withdrawal:     3) Using more than planned:        4) Sincere desire to cut down/unsuccessful efforts:       5) Spending a lot of time using/recovering from using:   3-4 times a week    6) a)recurrent  failure to fulfill obligations at work, school or home:         b) persistent, recurrent social or interpersonal problems caused by use:         c) given up/reduced/ neglected activities: social, occupational, or recreational activities :   islollated from others.       d) recurrent use in situation where physically hazardous:     7) Use knowing it makes a mental/physical condition worse:     8) Cravings (strong urge/desire to use the substance, may be more intense when around certain persons, places or things):     0/10  Last Use Date and time of last use in the past 30 days and description of use in last 30 days:   30+  years ago no smoke      Cocaine/Crack     20s   Per pt:denied use.    Per EMR CD assessment on 12/14/16:  10-15 times  20s no snort      Meth/  Amphetamines   No use           Heroin     No use           Other Opiates/  Synthetics   No use            Inhalants     No use           Benzodiazepines     No use           Hallucinogens     No use           Barbiturates/  Sedatives/  Hypnotics No use           Over-the-Counter Drugs   No use           Other     No use           Nicotine     16 Per patient: pack per day    Per EMR CD assessment on 12/14/16:  1) Tolerance:     Pack a day   2) Withdrawal:   Hard to quit    3) Using more than planned:        4) Sincere desire to  cut down/unsuccessful efforts:   Try to cut down and it hasn't worked  Gotten lozenges   5) Spending a lot of time using/recovering from using:   Daily   6) a)recurrent  failure to fulfill obligations at work, school or home:         b) persistent, recurrent social or interpersonal problems caused by use:   Family wants me to quit      c) given up/reduced/ neglected activities: social, occupational, or recreational activities :   Don't exercise as much         d) recurrent use in situation where physically hazardous:       7) Use knowing it makes a mental/physical condition worse:     Yes;   8) Cravings (strong urge/desire to use the substance, may be more intense when around certain persons, places or things):   10/10     Last Use Date and time of last use in the past 30 days and description of use in last 30 days:   10/22/19, pack, morning  no smoke     2. Do you use greater amounts of alcohol/other drugs to feel intoxicated or achieve the desired effect?  Yes.  Or use the same amount and get less of an effect?  No.  Example: see above     3A. Have you ever been to detox?     Yes    3B. When was the first time?     1/2013    3C. How many times since then?     3 detox     3D. Date of most recent detox:     2016    4.  Withdrawal symptoms: Have you had any of the following withdrawal symptoms?  Past 12 months Recent (past 30 days)   Sweating (Rapid Pulse)  Shaky / Jittery / Tremors  Sad / Depressed Feeling  High Blood Pressure  Anxiety / Worried Sweating (Rapid Pulse)  Shaky / Jittery / Tremors  Unable to Sleep  Agitation  Headache  Fatigue / Extremely Tired  Sad / Depressed Feeling  Muscle Aches  Vivid / Unpleasant Dreams  Irritability  Sensitivity to Noise  Nausea / Vomiting  Diarrhea  Diminished Appetite  Hallucinations  Confused / Disrupted Speech  Anxiety / Worried     's Visual Observations and Symptoms: Pt [facility] and on withdrawal protocol.     Based on the above information, is  withdrawal likely to require attention as part of treatment participation?  No    Dimension I Ratings   Acute intoxication/Withdrawal potential - The placing authority must use the criteria in Dimension I to determine a client s acute intoxication and withdrawal potential.    RISK DESCRIPTIONS - Severity ratin Client displays full functioning with good ability to tolerate and cope with withdrawal discomfort. No signs or symptoms of intoxication or withdrawal or resolving signs or symptoms.    REASONS SEVERITY WAS ASSIGNED (What about the amount of the person s use and date of most recent use and history of withdrawal problems suggests the potential of withdrawal symptoms requiring professional assistance? )     Pt currently admitted to [facility] and on withdrawal protocol. Pt admitted to [facility] related to use.            DIMENSION II - Biomedical Complications and Conditions   1. Do you have any current health/medical conditions?(Include any infectious diseases, allergies, or chronic or acute pain, history of chronic conditions)       Yes.   Illnesses/Medical Conditions you are receiving care for:   Per EMR:  Past Medical History:   Diagnosis Date     Alcoholism (H) 2013    had treatment at [facility]     Anxiety      Coronary artery disease 2014    Nuclear stress test with slight fixed defect inferiorly, no ischemia     Depression     w/anxiety     Esophageal varices with bleeding 2018    6 varices banded on EGD     Heart attack (H)      Hepatomegaly     Fatty liver, chronic alcoholic     Hyperlipemia      Hypertension      JANIS on CPAP      Peripheral vascular disease (H)      PVD (peripheral vascular disease) (H)      SDH (subdural hematoma) (H) 2018    Right side, resolved spontaneously     Spinal stenosis        2. Do you have a health care provider? When was your most recent appointment? What concerns were identified?     Dr. Nghia Esparza, currently admitted to [facility].    3. If  indicated by answers to items 1 or 2: How do you deal with these concerns? Is that working for you? If you are not receiving care for this problem, why not?      Medications    4A. List current medication(s) including over-the-counter or herbal supplements--including pain management:     Per eMR prescribed medications hx:    Prior to Admission medications    Medication Sig Last Dose Taking? Auth Provider   albuterol (PROAIR HFA/PROVENTIL HFA/VENTOLIN HFA) 108 (90 BASE) MCG/ACT Inhaler Inhale 2 puffs into the lungs every 6 hours as needed  prn Yes Unknown, Entered By History   DULoxetine (CYMBALTA) 60 MG EC capsule Take 1 capsule (60 mg) by mouth daily unknown Yes Brody Rand MD   fluticasone-vilanterol (BREO ELLIPTA) 200-25 MCG/INH oral inhaler Inhale 1 puff into the lungs daily    Yes Unknown, Entered By History   folic acid (FOLVITE) 1 MG tablet Take 1 tablet (1 mg) by mouth daily   Yes Elena Chiang MD   furosemide (LASIX) 40 MG tablet Take 1 tablet (40 mg) by mouth daily   Yes Elena Chiang MD   lactulose (CHRONULAC) 10 GM/15ML solution Take 30 mLs (20 g) by mouth 2 times daily   Yes Donell Lucas MD   mirtazapine (REMERON) 15 MG tablet Take 15 mg by mouth At Bedtime   Yes Unknown, Entered By History   multivitamin, therapeutic with minerals (THERA-VIT-M) TABS tablet Take 1 tablet by mouth daily   Yes Jude Duarte DO   nicotine (NICODERM CQ) 21 MG/24HR 24 hr patch Place 1 patch onto the skin daily   Yes Elena Chiang MD   pantoprazole (PROTONIX) 40 MG EC tablet Take 1 tablet (40 mg) by mouth every morning (before breakfast)   Yes Elena Chiang MD   propranolol (INDERAL) 10 MG tablet Take 1 tablet (10 mg) by mouth 2 times daily   Yes Elena Chiang MD   QUEtiapine (SEROQUEL) 50 MG tablet Take 50 mg by mouth At Bedtime    Yes Unknown, Entered By History   spironolactone (ALDACTONE) 25 MG tablet Take 2 tablets (50 mg) by mouth daily   Yes Elena Chiang MD   tamsulosin  (FLOMAX) 0.4 MG capsule Take 0.4 mg by mouth daily   Yes Unknown, Entered By History   traZODone (DESYREL) 100 MG tablet Take 200 mg by mouth At Bedtime   Yes Unknown, Entered By History   vitamin B1 (THIAMINE) 100 MG tablet Take 1 tablet (100 mg) by mouth daily   Yes Elena Chiang MD   order for DME Equipment being ordered: Walker Wheels () and Walker ()  Treatment Diagnosis: difficulty walking     Donell Lucsa MD      Current Facility-Administered Medications   Medication     glucose gel 15-30 g    Or     dextrose 50 % injection 25-50 mL    Or     glucagon injection 1 mg     DULoxetine (CYMBALTA) DR capsule 60 mg     fluticasone-vilanterol (BREO ELLIPTA) 200-25 MCG/INH inhaler 1 puff     furosemide (LASIX) tablet 40 mg     lidocaine (LMX4) cream     lidocaine 1 % 0.1-1 mL     LORazepam (ATIVAN) tablet 1-2 mg    Or     LORazepam (ATIVAN) injection 1-2 mg     magnesium sulfate 4 g in 100 mL sterile water (premade)     melatonin tablet 1 mg     miconazole (MICATIN/MICRO GUARD) 2 % powder     mirtazapine (REMERON) tablet 15 mg     multivitamin w/minerals (THERA-VIT-M) tablet 1 tablet     naloxone (NARCAN) injection 0.1-0.4 mg     nicotine (NICODERM CQ) 21 MG/24HR 24 hr patch 1 patch     nicotine Patch in Place     nicotine patch REMOVAL     ondansetron (ZOFRAN-ODT) ODT tab 4 mg    Or     ondansetron (ZOFRAN) injection 4 mg     pantoprazole (PROTONIX) EC tablet 40 mg     potassium chloride (KLOR-CON) Packet 20-40 mEq     potassium chloride 10 mEq in 100 mL intermittent infusion with 10 mg lidocaine     potassium chloride 10 mEq in 100 mL sterile water intermittent infusion (premix)     potassium chloride ER (K-DUR/KLOR-CON M) CR tablet 20-40 mEq     propranolol (INDERAL) tablet 10 mg     QUEtiapine (SEROquel) tablet 25-50 mg     QUEtiapine (SEROquel) tablet 50 mg     sodium chloride (PF) 0.9% PF flush 3 mL     sodium chloride (PF) 0.9% PF flush 3 mL     sodium phosphate 15 mmol in D5W intermittent  infusion     sodium phosphate 20 mmol in D5W intermittent infusion     sodium phosphate 25 mmol in D5W intermittent infusion     spironolactone (ALDACTONE) tablet 100 mg     traZODone (DESYREL) tablet 100 mg        4B. Do you follow current medical recommendations/take medications as prescribed?     Yes    4C. When did you last take your medication?     This morning     5. Has a health care provider/healer ever recommended that you reduce or quit alcohol/drug use?     Yes    6. Are you pregnant?     No    7. Have you had any injuries, assaults/violence towards you, accidents, health related issues, overdose(s) or hospitalizations related to your use of alcohol or other drugs:     Yes, explain: falling down and hit ribs. Pt is currently admitted.  Per EMR ED admissions/hospitalizations on 19, 19,7/10/19, 7/3/19,19,19, 18,18, 18.    8. Do you have any specific physical needs/accommodations? No    Dimension II Ratings   Biomedical Conditions and Complications - The placing authority must use the criteria in Dimension II to determine a client s biomedical conditions and complications.   RISK DESCRIPTIONS - Severity ratin Client tolerates and ramone with physical discomfort and is able to get the services that the client needs.    REASONS SEVERITY WAS ASSIGNED (What physical/medical problems does this person have that would inhibit his or her ability to participate in treatment? What issues does he or she have that require assistance to address?)    Pt reported medical conditions. Pt reported he has a pcp and is able to get the services he needs. Pt reported he has prescribed medications by his pcp for his medical conditions. Pt reported numerous past ED/hospitalization admissions related to use.       DIMENSION III - Emotional, Behavioral, Cognitive Conditions and Complications   1. (Optional) Tell me what it was like growing up in your family. (substance use, mental health,  "discipline, abuse, support)     Were you adopted?  Not adopted     Were your parents  or together?   Stayed together     When ?      Any emotional/ physical abuse in the family?  None     Any MI/CD issues?   Dad and brother cd     2. When was the last time that you had significant problems...  A. with feeling very trapped, lonely, sad, blue, depressed or hopeless  about the future? Past month    B. with sleep trouble, such as bad dreams, sleeping restlessly, or falling  asleep during the day? Past month    C. with feeling very anxious, nervous, tense, scared, panicked, or like  something bad was going to happen? Past month     D. with becoming very distressed and upset when something reminded  you of the past? 2-12 months ago    E. with thinking about ending your life or committing suicide? Past month    3. When was the last time that you did the following things two or more times?  A. Lied or conned to get things you wanted or to avoid having to do  something? Never    B. Had a hard time paying attention at school, work, or home? Never    C. Had a hard time listening to instructions at school, work, or home? 1+ years ago    D. Were a bully or threatened other people? Never    E. Started physical fights with other people? Never    Note: These questions are from the Global Appraisal of Individual Needs--Short Screener. Any item marked  past month  or  2 to 12 months ago  will be scored with a severity rating of at least 2.     For each item that has occurred in the past month or past year ask follow up questions to determine how often the person has felt this way or has the behavior occurred? How recently? How has it affected their daily living? And, whether they were using or in withdrawal at the time?    Per EMR psychiatric consult note on 10/23/19:  CHIEF COMPLAINT:  \"Yeah, but I'm not suicidal now.\"      HISTORY OF PRESENT ILLNESS:  The patient is a 64-year-old gentleman with a very severe " "persistent alcohol use disorder, as well as major depressive disorder.  He has been hospitalized here on a number of occasions and was seen by Dr. Kearney for psychiatric consultation last month.  He claims his depression has not been controlled.  He has been drinking 0.5 liters of vodka daily over the last week.  He came into the ER heavily intoxicated with a blood alcohol level of 0.4 and a negative drug screen.  He has known esophageal varices and has evidence of ascites.  He has been in treatment countless times and despite recurrent recommendations to go into treatment, he has refused this repeatedly.  Now he is telling me he would agree to go to treatment, stating \"I need to do something.\"  He states he is no longer feeling suicidal.  He is convalescing on station 66 and seems to be showing signs of withdrawal.  He has been taking Cymbalta, Remeron and Seroquel.  It is unclear if he has been compliant with these medications.  He has a distant history of a suicide gesture by his report.      On further questioning, there is no convincing history of hypomania, bear, generalized anxiety, panic, obsessive-compulsive disorder, eating disorder history, trauma history, ADHD.  He alludes to some issues with sleep, anxiety and depressed mood.  This seems to escalate when he is drinking.    4A. If the person has answered item 2E with  in the past year  or  the past month , ask about frequency and history of suicide in the family or someone close and whether they were under the influence.     Pt denied current SI.     Any history of suicide in your family? Or someone close to you?     No    4B. If the person answered item 2E  in the past month  ask about  intent, plan, means and access and any other follow-up information  to determine imminent risk. Document any actions taken to intervene  on any identified imminent risk.      Pt denied current SI.    5A. Have you ever been diagnosed with a mental health problem? "     Yes, If yes explain: per EMR:MADD. Per patient: anxiety and depression     5B. Are you receiving care for any mental health issues? If yes, what is the focus of that care or treatment?  Are you satisfied with the service? Most recent appointment?  How has it been helpful?     Yes, no MH providers currently. In the past psychiatrist Andreina Barrietnos at [facility].    6. Have you been prescribed medications for emotional/psychological problems?     Yes.  6B. Current mental health medication(s) If these medications are listed for Dimension II, reference item II-5. See above .   6C. Are you taking your medications as instructed?  yes.    7. Does your  provider know about your use?     No providers.    8A. Have you ever been verbally, emotionally, physically or sexually abused?      No     Follow up questions to learn current risk, continuing emotional impact.      None    8B. Have you received counseling for abuse?      None    9. Have you ever experienced or been part of a group that experienced community violence, historical trauma, rape or assault?     No    10A. Napoleon:    No    11. Do you have problems with any of the following things in your daily life?    Problem Solving, Concentration, Remembering and In relationships with others    Note: If the person has any of the above problems, follow up with items 12, 13, and 14. If none of the issues in item 11 are a problem for the person, skip to item 15.    Pt would benefit from coping skills.     12. Have you been diagnosed with traumatic brain injury or Alzheimer s?  No    13. If the answer to #12 is no, ask the following questions:    Have you ever hit your head or been hit on the head? Yes hockey stick     Were you ever seen in the Emergency Room, hospital or by a doctor because of an injury to your head? Yes    Have you had any significant illness that affected your brain (brain tumor, meningitis, West Nile Virus, stroke or seizure, heart attack, near  drowning or near suffocation)? No    14. If the answer to #12 is yes, ask if any of the problems identified in #11 occurred since the head injury or loss of oxygen. No    15A. Highest grade of school completed:     14 years     15B. Do you have a learning disability? No    15C. Did you ever have tutoring in Math or English? No    15D. Have you ever been diagnosed with Fetal Alcohol Effects or Fetal Alcohol Syndrome? No    16. If yes to item 15 B, C, or D: How has this affected your use or been affected by your use?     None    Dimension III Ratings   Emotional/Behavioral/Cognitive - The placing authority must use the criteria in Dimension III to determine a client s emotional, behavioral, and cognitive conditions and complications.   RISK DESCRIPTIONS - Severity ratin Client has difficulty with impulse control and lacks coping skills. Client has thoughts of suicide or harm to others without means; however, the thoughts may interfere with participation in some treatment activities. Client has difficulty functioning in significant life areas. Client has moderate symptoms of emotional, behavioral, or cognitive problems. Client is able to participate in most treatment activities.    REASONS SEVERITY WAS ASSIGNED - What current issues might with thinking, feelings or behavior pose barriers to participation in a treatment program? What coping skills or other assets does the person have to offset those issues? Are these problems that can be initially accommodated by a treatment provider? If not, what specialized skills or attributes must a provider have?    Pt reported mental health diagnosis. Pt reported he has prescribed medications for his MH symptoms. Pt denied current therapy. Pt denied past or current abuse of any type. Pt denied current SI. Pt denied past suicide attempts.        DIMENSION IV - Readiness for Change   1. You ve told me what brought you here today. (first section) What do you think the problem  really is?     Pt reported desire for inpatient treatment.     2. Tell me how things are going. Ask enough questions to determine whether the person has use related problems or assets that can be built upon in the following areas: Family/friends/relationships; Legal; Financial; Emotional; Educational; Recreational/ leisure; Vocational/employment; Living arrangements (DSM)      See above    3. What activities have you engaged in when using alcohol/other drugs that could be hazardous to you or others (i.e. driving a car/motorcycle/boat, operating machinery, unsafe sex, sharing needles for drugs or tattoos, etc     Driving     4. How much time do you spend getting, using or getting over using alcohol or drugs? (DSM)     See answer to question number one, dimension one above, re chemical use history.       5. Reasons for drinking/drug use (Use the space below to record answers. It may not be necessary to ask each item.)  Like the feeling Yes   Trying to forget problems Yes   To cope with stress Yes   To relieve physical pain Yes   To cope with anxiety Yes   To cope with depression Yes   To relax or unwind No   Makes it easier to talk with people Yes   Partner encourages use N/A   Most friends drink or use Yes   To cope with family problems Yes   Afraid of withdrawal symptoms/to feel better No   Other (specify)  No     A. What concerns other people about your alcohol or drug use/Has anyone told you that you use too much? What did they say? (DSM)     Pt reported family and friends.    B. What did you think about that/ do you think you have a problem with alcohol or drug use?     Yes     6. What changes are you willing to make? What substance are you willing to stop using? How are you going to do that? Have you tried that before? What interfered with your success with that goal?      Do anything     7. What would be helpful to you in making this change?     Inpatient treatment, AA     Dimension IV Ratings   Readiness for  Change - The placing authority must use the criteria in Dimension IV to determine a client s readiness for change.   RISK DESCRIPTIONS - Severity rating: 3 Client displays inconsistent compliance, minimal awareness of either the client s addiction or mental disorder, and is minimally cooperative.    REASONS SEVERITY WAS ASSIGNED - (What information did the person provide that supports your assessment of his or her readiness to change? How aware is the person of problems caused by continued use? How willing is she or he to make changes? What does the person feel would be helpful? What has the person been able to do without help?)      Pt reported desire for inpatient treatment. Pt has had numerous ED/hospitalizations related to use and despite recommendation by medical providers of seeking treatment in the past admissions pt declined and then re-admitted related to use.           DIMENSION V - Relapse, Continued Use, and Continued Problem Potential   1. In what ways have you tried to control, cut-down or quit your use? If you have had periods of sobriety, how did you accomplish that? What was helpful? What happened to prevent you from continuing your sobriety? (DSM)     Pt reported: abstaining from use in the last coming months due to being in the hospital been sober otherwise, using daily when not admitted    Per EMR CD assessment on 12/14/16:Go to AA; myself; sponsor.      2. Have you experienced cravings? If yes, ask follow up questions to determine if the person recognizes triggers and if the person has had any success in dealing with them.     Yes    3. Have you been treated for alcohol/other drug abuse/dependence?     Yes.  3B. Number of times(lifetime) (over what period) 5 .  3C. Number of times completed treatment (lifetime) 4.  3D. During the past three years have you participated in outpatient and/or residential?  Yes.  3E. When and where? [facility] .   3F. What was helpful? What was not? 12 steps  helpful and spending [place] there .    Per [facility] HOD/IP note on 1/25/17:  EVALUATION COUNSELOR:  Arden Zimmerman JD, MA, [facility].   TREATMENT COUNSELOR:  Earline Rubio MS, [facility], LPC.   REFERRAL SOURCE:  [facility].   PROGRAM:  [facility].   ADMISSION DATE:  12/20/2016.   DISCHARGE DATE:  01/26/2017.   ADMISSION DIAGNOSES:   1.  303.90/F10.20, alcohol use disorder, severe.     2.  305.20/F12.10, cannabis use disorder, mild, in sustained remission.     3.  305.10/F17.200,  tobacco use disorder, severe.   DISCHARGE DIAGNOSES:   1.  303.90/F10.20, alcohol use disorder, severe.     2.  305.20/F12.10, cannabis use disorder, mild, in sustained remission.     3.  305.10/F17.200,  tobacco use disorder, severe.   DISCHARGE STATUS:  05, transferred to another program.   LAST USE DATE:  Jim Richard self-reports that 01/26/2017 was his last use date which is this day of dictation.   HOURS OF TREATMENT COMPLETED:  18.   PROGNOSIS:  Unfavorable at this time.  However, it can be raised to favorable should he follow through with the treatment program and their recommendations and be able to remain abstinent.   LIVING ARRANGEMENTS AT DISCHARGE:  At home or temporarily in a lodging facility.   CONTINUING CARE RECOMMENDATIONS:  It is recommended that Jim follow through with the treatment program in a lodging facility.  He should possibly take Antabuse and/or Campral or naltrexone during or after his stay as he seems to be having great cravings.  Jim should also seek the support of a therapist and discuss his depression as well as possible grief issues related to his divorce and even to his chronic pain.  It is recommended that he continue to work with his [facility] and his psychiatrist.  He should continue to take his medications as directed and get regular checkups with this.     4. Support group participation: Have you/do  you attend support group meetings to reduce/stop your alcohol/drug use? How recently? What was your experience? Are you willing to restart? If the person has not participated, is he or she willing?     AA-last time went about a month      5. What would assist you in staying sober/straight?     See above     Dimension V Ratings   Relapse/Continued Use/Continued problem potential - The placing authority must use the criteria in Dimension V to determine a client s relapse, continued use, and continued problem potential.   RISK DESCRIPTIONS - Severity ratin No awareness of the negative impact of mental health problems or substance abuse. No coping skills to arrest mental health or addiction illnesses, or prevent relapse.    REASONS SEVERITY WAS ASSIGNED - (What information did the person provide that indicates his or her understanding of relapse issues? What about the person s experience indicates how prone he or she is to relapse? What coping skills does the person have that decrease relapse potential?)      Pt reported drinking daily. Pt reported only time he can abstain from use is when he is hospitalized. Pt reported cravings. Pt reported five past treatments and his last treatment was [id number]. Pt reported past sober support group meetings. Pt appears to be at high risk for continued use evidenced by his use pattern. Pt appears to lacks impulse control, sober coping skills, and long-term sober maintenance skills.          DIMENSION VI - Recovery Environment   1. Are you employed/attending school? Tell me about that.     Pt reported he is not working.     2A. Describe a typical day; evening for you. Work, school, social, leisure, volunteer, spiritual practices.  No typical day      Drinking      2B. How often do you spend more time than you planned using or use more than you planned? (DSM)     Daily.       3. How important is using to your social connections? Do many of your family or friends use?     Not  important     4A. Are you currently in a significant relationship?     No    4C. Sexual Orientation:     Heterosexual    5A. Who do you live with?      Town home roommate    5B. Tell me about their alcohol/drug use and mental health issues.     Sober roommate.     5C. Are you concerned for your safety there? No    5D. Are you concerned about the safety of anyone else who lives with you? No    6A. Do you have children who live with you?     No    6B. Do you have children who do not live with you?     No    7A. Who supports you in making changes in your alcohol or drug use? What are they willing to do to support you? Who is upset or angry about you making changes in your alcohol or drug use? How big a problem is this for you?      See below     7B. This table is provided to record information about the person s relationships and available support It is not necessary to ask each item; only to get a comprehensive picture of their support system.  How often can you count on the following people when you need someone?   Partner / Spouse No partner   Parent(s)/Aunt(s)/Uncle(s)/Grandparents No   Sibling(s)/Cousin(s) Usually supportive   Child(iesha) No children   Other relative(s) No   Friend(s)/neighbor(s) Always supportive   Child(iesha) s father(s)/mother(s) Always supportive   Support group member(s) Always supportive   Community of [place] members No   /counselor/therapist/healer No   Other (specify) No     8A. What is your current living situation?     In a town home     8B. What is your long term plan for where you will be living?     As long as I can     8C. Tell me about your living environment/neighborhood? Ask enough follow up questions to determine safety, criminal activity, availability of alcohol and drugs, supportive or antagonistic to the person making changes.      Good neighborhood     9. Criminal justice history: Gather current/recent history and any significant history related to substance  use--Arrests? Convictions? Circumstances? Alcohol or drug involvement? Sentences? Still on probation or parole? Expectations of the court? Current court order? Any sex offenses - lifetime? What level? (DSM)    Two dwis     10. What obstacles exist to participating in treatment? (Time off work, childcare, funding, transportation, pending [facility] time, living situation)     None    Dimension VI Ratings   Recovery environment - The placing authority must use the criteria in Dimension VI to determine a client s recovery environment.   RISK DESCRIPTIONS - Severity ratin Client has (A) Chronically antagonistic significant other, living environment, family, peer group or long-term criminal justice involvement that is harmful to recovery or treatment progress, or (B) Client has an actively antagonistic significant other, family, work, or living environment with immediate threat to the client s safety and well-being.    REASONS SEVERITY WAS ASSIGNED - (What support does the person have for making changes? What structure/stability does the person have in his or her daily life that will increase the likelihood that changes can be sustained? What problems exist in the person s environment that will jeopardize getting/staying clean and sober?)     Pt reported he is not working. Pt reported he lives with a roommate who is sober in a town home. Pt reported past legal issues but denied current. Pt denied being in a romantic relationship or having children. Pt appears to lack sober support network and sober structured routine. Pt appears to lack a living environment conducive to sobriety.           Client Choice/Exceptions   Would you like services specific to language, age, gender, culture, Gnosticist preference, race, ethnicity, sexual orientation or disability?  No    What particular treatment choices and options would you like to have? IP    Do you have a preference for a particular treatment program? None    Criteria for  Diagnosis     Criteria for Diagnosis  DSM-5 Criteria for Substance Use Disorder  Instructions: Determine whether the client currently meets the criteria for Substance Use Disorder using the diagnostic criteria in the DSM-V pp.481-58. Current means during the most recent 12 months outside a facility that controls access to substances    Category of Substance Severity (ICD-10 Code / DSM 5 Code)     Alcohol Use Disorder Severe  (10.20) (303.90)   Cannabis Use Disorder Mild  (F12.10) (305.20) in sustained remission   Hallucinogen Use Disorder No   Inhalant Use Disorder No   Opioid Use Disorder No   Sedative, Hypnotic, or Anxiolytic Use Disorder No   Stimulant Related Disorder No   Tobacco Use Disorder Severe   (F17.200) (305.1)    Other (or unknown) Substance Use Disorder No       Collateral Contact Summary   Number of contacts made: one      Contact with referring person:  Yes, EMR.    If court related records were reviewed, summarize here: No    Rule 25 Assessment Summary and Plan   's Recommendation    IP treatment through [facility] or [facility].       Collateral Contacts     Name:    Electronic Medical Records (EMR)   Relationship:      Medical Records Phone Number:    None Releases:      Yes       Pt medical record was reviewed and utilized for collateral purposes of this assessment and largely agreed with the information from the patient.    ollateral Contacts      A problematic pattern of alcohol/drug use leading to clinically significant impairment or distress, as manifested by at least two of the following, occurring within a 12-month period:    Alcohol/drug is often taken in larger amounts or over a longer period than was intended.  There is a persistent desire or unsuccessful efforts to cut down or control alcohol/drug use  A great deal of time is spent in activities necessary to obtain alcohol, use alcohol, or recover from its effects.  Craving, or a strong desire or urge to use alcohol/drug  Continued  alcohol use despite having persistent or recurrent social or interpersonal problems caused or exacerbated by the effects of alcohol/drug.  Important social, occupational, or recreational activities are given up or reduced because of alcohol/drug use.  Recurrent alcohol/drug use in situations in which it is physically hazardous.  Alcohol/drug use is continued despite knowledge of having a persistent or recurrent physical or psychological problem that is likely to have been caused or exacerbated by alcohol.  Tolerance, as defined by either of the following: A need for markedly increased amounts of alcohol/drug to achieve intoxication or desired effect.  Withdrawal, as manifested by either of the following: The characteristic withdrawal syndrome for alcohol/drug (refer to Criteria A and B of the criteria set for alcohol/drug withdrawal).      Specify if: In early remission:  After full criteria for alcohol/drug use disorder were previously met, none of the criteria for alcohol/drug use disorder have been met for at least 3 months but for less than 12 months (with the exception that Criterion A4,  Craving or a strong desire or urge to use alcohol/drug  may be met).     In sustained remission:   After full criteria for alcohol use disorder were previously met, non of the criteria for alcohol/drug use disorder have been met at any time during a period of 12 months or longer (with the exception that Criterion A4,  Craving or strong desire or urge to use alcohol/drug  may be met).   Specify if:   This additional specifier is used if the individual is in an environment where access to alcohol is restricted.    Mild: Presence of 2-3 symptoms    Moderate: Presence of 4-5 symptoms    Severe: Presence of 6 or more symptoms

## 2019-10-27 NOTE — PLAN OF CARE
DATE & TIME: 10/26 [id number]   Cognitive Concerns/ Orientation : Disoriented to time, forgetful  BEHAVIOR & AGGRESSION TOOL COLOR: Green  CIWA SCORE: 4, 5, 3 (tremors)  ABNL VS/O2: VSS on RA  MOBILITY: A1-2, GB, W  PAIN MANAGMENT: Denies  DIET: Regular with 1000mL fluid restriction  BOWEL/BLADDER: Voiding in BR, BM 10/26  ABNL LAB/BG: K/Mg replaced on days - WNL  DRAIN/DEVICES: PIVSL  TELEMETRY RHYTHM: NSR  SKIN: Bruised, scabbing & abrasions  TESTS/PROCEDURES: NA  D/C DAY/GOALS/PLACE: pending - CD treatment vs TCU  OTHER IMPORTANT INFO: Chem dep to see, VPM for impulsivity (resolving)

## 2019-10-27 NOTE — PROGRESS NOTES
[facility]  [address] [place] [address] [address]  [place] [address]        ADULT CD ASSESSMENT ADDENDUM      Patient Name: Jim Richard  Cell Phone:   Home: [phone] (home)    Mobile:   Telephone Information:   Mobile [phone]       Email:  The patient is not willing to share his/her e-mail address.  Emergency Contact: None   Tel: None    The patient reported being:      With which race do you identify? White    Initial Screening Questions     1. Are you currently having severe withdrawal symptoms that are putting yourself or others in danger?  Yes, explain: currently at [facility].     2. Are you currently having severe medical problems that require immediate attention?  Yes, explain: currently at [facility].    3. Are you currently having severe emotional or behavioral problems that are putting yourself or others at risk of harm?  No    4. Do you have sufficient reading skills that will enable you to understand written materials, including the program rules and client rights materials?  Yes     Family History and other additional information     Who raised you? (parents, grandparents, adoptive parents, step-parents, etc.)    Both Parents    Please tell me what it was like growing up in your family. (please include any history of substance abuse, mental health issues, emotional/physical/sexual abuse, forms of discipline, and support)     Brother and father have alcohol issues     Do you have any children or Stepchildren? No    Are you being investigated by Child Protection Services? No    Do you have a child protection worker, probation office or ?  No    How would you describe your current finances?  Some money problems    If you are having problems, (unpaid bills, bankruptcy, IRS problems) please explain:  No    If working or a student are you able to function appropriately in that setting? No, explain: due to alcohol use.      Describe your preferred learning style:  by reading, by  hands-on practice and by watching someone else demonstrate    What are your some of your personal strengths?  AA    Do you currently participate in community salinas activities, such as attending Hinduism, temple, Religion or Mandaen services?  Yes, explain: sometimes Hinduism    How does your spirituality impact your recovery?  Doing the right things.     Do you currently self-administer your medications?  Yes    Have you ever had to lie to people important to you about how much you kaur?   No   Have you ever felt the need to bet more and more money?   No   Have you ever attempted treatment for a gambling problem?   No   Have you ever touched or fondled someone else inappropriately or forced them to have sex with you against their will?   No   Are you or have you ever been a registered sex offender?   No   Is there any history of sexual abuse in your family? No   Have you ever felt obsessed by your sexual behavior, such as having sex with many partners, masturbating often, using pornography often?   No     Have you ever received therapy or stayed in the hospital for mental health problems?   No     Have you ever hurt yourself, such as cutting, burning or hitting yourself?   No     Have you ever purged, binged or restricted yourself as a way to control your weight?   No     Are you on a special diet?   No     Do you have any concerns regarding your nutritional status?   No     Have you had any appetite changes in the last 3 months?   No   Have you had weight loss or weight gain of more than 10 lbs in the last 3 months?   If patient gained or lost more than 10 lbs, then refer to program RN / attending Physician for assessment.   No   Was the patient informed of BMI?    Normal, No Intervention   No   Have you engaged in any risk-taking behavior that would put you at risk for exposure to blood-borne or sexually transmitted diseases?   No   Do you have any dental problems?   No   Have you ever lived through any trauma or  "stressful life events?   No   In the past month, have you had any of the following symptoms related to the trauma listed above? (dreams, intense memories, flashbacks, physical reactions, etc.)   No   Have you ever believed people were spying on you, or that someone was plotting against you or trying to hurt you?   No   Have you ever believed someone was reading your mind or could hear your thoughts or that you could actually read someone's mind or hear what another person was thinking?   No   Have you ever believed that someone of some force outside of yourself was putting thoughts into your mind or made you act in a way that was not your usual self?  Have you ever though you were possessed?   No   Have you ever believed you were being sent special messages through the TV, radio or newspaper?   No   Have you ever heard things other people couldn't hear, such as voices or other noises?   No   Have you ever had visions when you were awake?  Or have you ever seen things other people couldn't see?   No   Do you have a valid 's license?    Yes     PHQ-9, MARIA FERNANDA-7 and Suicide Risk Assessment   PHQ-9 on 10/27/2019 MARIA FERNANDA-7 on 10/27/2019   The patient's PHQ-9 score was none   The patient's MARIA FERNANDA-7 score was none       [place]-Suicide Severity Rating Scale   Suicide Ideation   1.) Have you ever wished you were dead or that you could go to sleep and not wake up?     Lifetime:  Yes   Past Month:  Yes     2.) Have you actually had any thoughts of killing yourself?   Lifetime:  Yes   Past Month:  Yes     3.) Have you been thinking about how you might do this?     Lifetime:  Yes, Describe: per EMR ED admission note on 10/22/19:  Per RN report, the patient voiced that he wants to die. He reported that he took his anti-anxiety meds, blood pressure meds, and drank alcohol. He also was \"tapped\" about 3 weeks ago and he feels that his abdomen is very distended. Here, the patient endorses that he took 2 x hydrochlorothiazide, 2 x " "spironolactone, and last drank this morning. He voices that he drinks \"7.5\" oz/drinks a day. He has been through withdrawal before with no prior seizures, and he is not withdrawing here, per his report. He denies any vomiting or any other drug ingestion. He endorses baseline leg swelling, so this is not new today\".    Past Month:  Yes, Describe: see lifetime     4.) Have you had these thoughts and had some intention of acting on them?     Lifetime:  Yes, Describe: see above   Past Month:  Yes, Describe: see above     5.) Have you started to work out the details of how to kill yourself?   Lifetime:  Yes, Describe: see above   Past Month:  Yes, Describe: see above     6.) Do you intend to carry out this plan?      Lifetime:  Yes, Describe: see above   Past Month:  Yes, Describe: see above     Intensity of Ideation   Intensity of ideation (1 being least severe, 5 being most severe):     Lifetime:  5   Past Month:  5     How often do you have these thoughts?  Once a week     When you have the thoughts how long do they last?  Less than 1 hour/some of the time     Can you stop thinking about killing yourself or wanting to die if you want to?  Yes, can control thoughts with some difficulty     Are there things - anyone or anything (i.e. family, Voodoo, pain of death) that stopped you from wanting to die or acting on thoughts of suicide?  Protective factors most likely did not stop you     What sort of reasons did you have for thinking about wanting to die or killing yourself (ie end pain, stop how you were feeling, get attention or reaction, revenge)?  Mostly to get attention, revenge, or a reaction from others     Suicidal Behavior   (Suicide Attempt) - Have you made a suicide attempt?     Lifetime:  Yes.  Total number of attempts:  1.  Date of most recent attempt:  See above.   Past Month:  The patient made a suicide attempt within the past month on see above.     Have you engaged in self-harm (non-suicidal " self-injury)?  The patient denied having any history of engaging in self-harm (non-suicidal self-injury).     (Interrupted Attempt) - Has there been a time when you started to do something to end your life but someone or something stopped you before you actually did anything?  No     (Aborted or Self-Interrupted Attempt) - Has there been a time when you started to do something to try to end your life but you stopped yourself before you actually did anything?  No     (Preparatory Acts of Behavior) - Have you taken any steps towards making suicide attempt or preparing to kill yourself (such as collecting pills, getting a gun, giving valuables away or writing a suicide note)?  Yes, Describe: see above     Actual Lethality/Medical Damage:  1. - Minor physical damage (e.g., lethargic speech; first-degree burns; mild bleeding; sprains).       2008  [employer] [facility]  Used with permission by Adalgisa Flores, PhD.       Guide to C-SSRS Risk Ratings   NO IDEATION:  with no active thoughts IDEATION: with a wish to die. IDEATION: with active thoughts. Risk Ratings   If Yes No No 0 - Very Low Risk   If NA Yes No 1 - Low Risk   If NA Yes Yes 2 - Low/moderate risk   IDEATION: associated thoughts of methods without intent or plan INTENT: Intent to follow through on suicide PLAN: Plan to follow through on suicide Risk Ratings cont...   If Yes No No 3 - Moderate Risk   If Yes Yes No 4 - High Risk   If Yes Yes Yes 5 - High Risk   The patient's ADDITIONAL RISK FACTORS and lack of PROTECTIVE FACTORS may increase their overall suicide risk ratings.     Additional Risk Factors:    Significant history of having untreated or poorly treated mental health symptoms     Significant history of physical illness or chronic medical problems   Protective Factors:    Having people in his/her life that would prevent the patient from considering a suicide attempt (i.e. young children, spouse, parents, etc.)     Risk Status  "  Past month: 5. - Very High Risk: Refer to higher level of care as indicated in consultation    Past 24 hours: 0. - Very Low Risk:  Evaluation Counselors:  Document in Epic / SBAR to counselor \"Very Low Risk\".      Treatment Counselors:  Reassess upon admission as applicable, assess weekly in progress notes under Dimension 3 and summarize in Discharge / Treatment summary under Dimension 3.   Additional information to support suicide risk rating: Pt currently admitted to [facility] and being monitored for appropriateness for discharge.      Mental Health Status   Physical Appearance/Attire: Appears stated age   Hygiene: well groomed   Eye Contact: at examiner   Speech Rate:  regular   Speech Volume: regular   Speech Quality: fluid   Cognitive/Perceptual:  reality based   Cognition: memory intact    Judgment: able to concentrate   Insight: able to concentrate   Orientation:  time, place, person and situation   Thought: concrete   Hallucinations:  none   General Behavioral Tone: cooperative   Psychomotor Activity: no problem noted   Gait:  Did not get up from hospital bed   Mood: appropriate   Affect: congruence/appropriate   Counselor Notes: NA     Criteria for Diagnosis: DSM-5 Criteria for Substance Use Disorders      Alcohol Use Disorder Severe - 303.90 (F10.20)  Cannabis Use Disorder Mild - 305.20 (F12.10) In sustained remission  Tobacco Use Disorder Severe - 305.10 (F17.200)    Level of Care   I.) Intoxication and Withdrawal: 0   II.) Biomedical:  1   III.) Emotional and Behavioral:  2   IV.) Readiness to Change:  3   V.) Relapse Potential: 4   VI.) Recovery Environmental: 4     Initial Problem List     The patient is currently living in an unhealthy and/or using environment  The patient lacks relapse prevention skills  The patient has poor coping skills  The patient has poor refusal skills   The patient lacks the ability to effectively manage his/her mental health issues    Patient/Client is willing to follow treatment " recommendations.  Yes    Counselor: Augusta Parrish [facility]    Vulnerable Adult Checklist for LODGING:     This LODGING patient, or other Residential/Lodging CD Treatment patient is a categorical Vulnerable Adult according to [place] Statute 626.5572 subdivision 21.    Susceptibility to abuse by others     1.  Have you ever been emotionally abused by anyone?          No    2.  Have you ever been bullied, or physically assaulted by anyone?        No    3.  Have you ever been sexually taken advantage of or sexually assaulted?        No    4.  Have you ever been financially taken advantage of?        No    5.  Have you ever hurt yourself intentionally such as burns or cuts?       No    Risk of abusing other vulnerable adults     1.  Have you ever bullied, berated or emotionally degraded someone else?       No    2.  Have you ever financially taken advantage of someone else?       No    3.  Have you ever sexually exploited or assaulted another person?       No    4.  Have you ever gotten into fights, verbal arguments or physically assaulted someone?          No    Based on the above information:    This [facility]ging [facility] patient, or other Residential/Lodging CD Treatment patient is a categorical Vulnerable Adult according to [place] Statue 626.5572 subdivision 21.                                                                                                                                                                                                       This person has a history of abuse, but is assessed as stable and not in need of an individual abuse prevention plan beyond the program abuse prevention plan.

## 2019-10-27 NOTE — PROGRESS NOTES
10/27/19 1100   Quick Adds   Type of Visit Initial Occupational Therapy Evaluation   Living Environment   Lives With other (see comments)  (roommate)   Living Arrangements house  (split entry [facility])   Home Accessibility stairs to enter home;stairs within home   Number of Stairs, Main Entrance   (1)   Number of Stairs, Within Home, Primary   (12 steps;6/6 split entry, B railings)   Transportation Anticipated car, drives self   Living Environment Comment Pt. reports he left phone at home, cannot contact roommate, as dos not know his number;Pt. reports roommate works full-time, pt. alone at home;Pt. reports vanity by standard toilet, walk-in shower w/grab bars, no shower chair.   Self-Care   Usual Activity Tolerance good   Current Activity Tolerance moderate   Regular Exercise No   Equipment Currently Used at Home grab bar, tub/shower;walker, rolling   Activity/Exercise/Self-Care Comment Pt. reports he occasionally uses a walker at home.   Functional Level   Ambulation 1-->assistive equipment   Transferring 0-->independent   Toileting 0-->independent   Bathing 1-->assistive equipment   Dressing 0-->independent   Eating 0-->independent   Communication 0-->understands/communicates without difficulty   Swallowing 0-->swallows foods/liquids without difficulty   Cognition 0 - no cognition issues reported   Fall history within last six months yes   Number of times patient has fallen within last six months 3   Which of the above functional risks had a recent onset or change? ambulation;transferring;toileting;bathing;dressing   General Information   Onset of Illness/Injury or Date of Surgery - Date 10/22/19   Referring Physician    Patient/Family Goals Statement CD treatment   Additional Occupational Profile Info/Pertinent History of Current Problem Per chart:Jim Richard is a 64 year old male admitted on 10/22/2019. He presents with suicidal ideation and intentional ingestion. Patient has history of  alcoholic liver disease, ulcers, esophageal varices, CAD, hypertension, hyperlipidemia, peripheral vascular disease, tobacco use, subdural hematoma, and chronic low back pain who presented to the ED with suicidal ideation. Pt.w/ recent hospitalizations in August, September.   Precautions/Limitations fall precautions   General Observations Pt. A & O, agreeable to OT, follows directions, minimal impuslsiveness;VPM in room.   General Info Comments Pt. resides w/ gail who works full-time;several recent hospitalizations.   Cognitive Status Examination   Orientation orientation to person, place and time   Level of Consciousness alert   Follows Commands (Cognition) WNL;follows one step commands   Memory impaired  (STM recall 2/3 words after delay)   Executive Function Impulsive;Self awareness/monitoring impaired   Cognitive Comment Will plan to complete further cognitive testing;pt. score 21/30 on [facility] last hospitalization;pt. responded to 3/3 safety/judgment questions accurately   Visual Perception   Visual Perception Comments no vision problems noted/reported   Sensory Examination   Sensory Comments pt. reports baseline R great toe numbness   Pain Assessment   Patient Currently in Pain Yes, see Vital Sign flowsheet  (chronic LBP)   Posture   Posture forward head position;protracted shoulders   Range of Motion (ROM)   ROM Comment BUE WNL/WFL   Strength   Strength Comments BUE 5/5;gen.weakness/decreased activity tolerance   Hand Strength   Hand Strength Comments WNL   Coordination   Coordination Comments current tremors   Mobility   Bed Mobility Comments SBA-CGA   Transfer Skill: Sit to Stand   Level of [place]: Sit/Stand contact guard   Physical Assist/Nonphysical Assist: Sit/Stand verbal cues;1 person assist   Assistive Device for Transfer: Sit/Stand rolling walker   Toilet Transfer   Toilet Transfer Comments standard toilet w/vanity support at home   Transfer Skill: Toilet Transfer   Level of [place]:  "Toilet contact guard   Physical Assist/Nonphysical Assist: Toilet verbal cues;1 person assist   Assistive Device rolling walker;grab bars;other (see comments)  (commode/handrails)   Tub/Shower Transfer   Tub/Shower Transfer Comments walk-in shower at home   Balance   Balance Comments impaired;CGA + WW for mobility   Lower Body Dressing   Level of [place]: Dress Lower Body stand-by assist   Toileting   Level of [place]: Toilet contact guard   Grooming   Level of [place]: Grooming contact guard   Instrumental Activities of Daily Living (IADL)   Previous Responsibilities meal prep;housekeeping;laundry;medication management;driving   Activities of Daily Living Analysis   Impairments Contributing to Impaired Activities of Daily Living balance impaired;cognition impaired;coordination impaired;pain;strength decreased   General Therapy Interventions   Planned Therapy Interventions ADL retraining;cognition;strengthening   Clinical Impression   Criteria for Skilled Therapeutic Interventions Met yes, treatment indicated   OT Diagnosis Decline in ADL performance   Influenced by the following impairments weakness, impaired balance, impaired coord,.impaired cognition   Assessment of Occupational Performance 3-5 Performance Deficits   Identified Performance Deficits Currently below baseline w/ dressing,bathing, grooming/standing, toileting, fx. mobility, IADLs   Clinical Decision Making (Complexity) Low complexity   Therapy Frequency Daily   Predicted Duration of Therapy Intervention (days/wks) 2-3 days   Anticipated Discharge Disposition Transitional Care Facility   Risks and Benefits of Treatment have been explained. Yes   Patient, Family & other staff in agreement with plan of care Yes   [facility] TM \"6 Clicks\"   2016, Trustees of [facility], under license to [employer].  All rights reserved.   6 Clicks Short Forms Daily Activity Inpatient Short Form   [facility] AM-PAC  \"6 Clicks\" " Daily Activity Inpatient Short Form   1. Putting on and taking off regular lower body clothing? 3 - A Little   2. Bathing (including washing, rinsing, drying)? 3 - A Little   3. Toileting, which includes using toilet, bedpan or urinal? 3 - A Little   4. Putting on and taking off regular upper body clothing? 4 - None   5. Taking care of personal grooming such as brushing teeth? 3 - A Little   6. Eating meals? 4 - None   Daily Activity Raw Score (Score out of 24.Lower scores equate to lower levels of function) 20   Total Evaluation Time   Total Evaluation Time (Minutes) 14

## 2019-10-27 NOTE — CONSULTS
The writer met with the patient introduced herself and her role. Pt is familiar with the writer as the writer has met with the patient during past admissions. Pt completed a CD assessment with the writer. Pt is recommended inpatient treatment. Pt has Medicare and his only two treatment options are [facility] and [facility]. Pt signed release of information for [facility]. The writer will finish up the assessment, route assessment to be cosigned by attending MD as Medicare with only fund services if assessment is cosigned by MD. Once cosigned by MD the writer will send out the referral to [facility] and [facility]. The writer left the patient her business card.

## 2019-10-28 ENCOUNTER — APPOINTMENT (OUTPATIENT)
Dept: OCCUPATIONAL THERAPY | Facility: CLINIC | Age: 64
DRG: 897 | End: 2019-10-28
Attending: INTERNAL MEDICINE
Payer: MEDICARE

## 2019-10-28 ENCOUNTER — APPOINTMENT (OUTPATIENT)
Dept: ULTRASOUND IMAGING | Facility: CLINIC | Age: 64
DRG: 897 | End: 2019-10-28
Attending: INTERNAL MEDICINE
Payer: MEDICARE

## 2019-10-28 LAB
ALBUMIN FLD-MCNC: 0.8 G/DL
ALBUMIN SERPL-MCNC: 2.7 G/DL (ref 3.4–5)
ANION GAP SERPL CALCULATED.3IONS-SCNC: 4 MMOL/L (ref 3–14)
APPEARANCE FLD: NORMAL
BUN SERPL-MCNC: 28 MG/DL (ref 7–30)
CALCIUM SERPL-MCNC: 8.5 MG/DL (ref 8.5–10.1)
CHLORIDE SERPL-SCNC: 105 MMOL/L (ref 94–109)
CO2 SERPL-SCNC: 27 MMOL/L (ref 20–32)
COLOR FLD: YELLOW
CREAT SERPL-MCNC: 1.1 MG/DL (ref 0.66–1.25)
GFR SERPL CREATININE-BSD FRML MDRD: 70 ML/MIN/{1.73_M2}
GLUCOSE SERPL-MCNC: 96 MG/DL (ref 70–99)
LYMPHOCYTES NFR FLD MANUAL: 53 %
MAGNESIUM SERPL-MCNC: 1.7 MG/DL (ref 1.6–2.3)
MONOS+MACROS NFR FLD MANUAL: 17 %
NEUTS BAND NFR FLD MANUAL: 3 %
OTHER CELLS FLD MANUAL: 27 %
PHOSPHATE SERPL-MCNC: 3.4 MG/DL (ref 2.5–4.5)
POTASSIUM SERPL-SCNC: 3.5 MMOL/L (ref 3.4–5.3)
PROT FLD-MCNC: 1.7 G/DL
SODIUM SERPL-SCNC: 136 MMOL/L (ref 133–144)
SPECIMEN SOURCE FLD: NORMAL
WBC # FLD AUTO: 352 /UL

## 2019-10-28 PROCEDURE — 83735 ASSAY OF MAGNESIUM: CPT | Performed by: INTERNAL MEDICINE

## 2019-10-28 PROCEDURE — 89051 BODY FLUID CELL COUNT: CPT | Performed by: INTERNAL MEDICINE

## 2019-10-28 PROCEDURE — 82042 OTHER SOURCE ALBUMIN QUAN EA: CPT | Performed by: INTERNAL MEDICINE

## 2019-10-28 PROCEDURE — 84157 ASSAY OF PROTEIN OTHER: CPT | Performed by: INTERNAL MEDICINE

## 2019-10-28 PROCEDURE — 25000132 ZZH RX MED GY IP 250 OP 250 PS 637: Mod: GY | Performed by: INTERNAL MEDICINE

## 2019-10-28 PROCEDURE — 25000132 ZZH RX MED GY IP 250 OP 250 PS 637: Mod: GY | Performed by: PSYCHIATRY & NEUROLOGY

## 2019-10-28 PROCEDURE — 80069 RENAL FUNCTION PANEL: CPT | Performed by: INTERNAL MEDICINE

## 2019-10-28 PROCEDURE — 97535 SELF CARE MNGMENT TRAINING: CPT | Mod: GO

## 2019-10-28 PROCEDURE — 36415 COLL VENOUS BLD VENIPUNCTURE: CPT | Performed by: INTERNAL MEDICINE

## 2019-10-28 PROCEDURE — 25000125 ZZHC RX 250: Performed by: STUDENT IN AN ORGANIZED HEALTH CARE EDUCATION/TRAINING PROGRAM

## 2019-10-28 PROCEDURE — 99233 SBSQ HOSP IP/OBS HIGH 50: CPT | Performed by: INTERNAL MEDICINE

## 2019-10-28 PROCEDURE — 25000132 ZZH RX MED GY IP 250 OP 250 PS 637: Mod: GY | Performed by: STUDENT IN AN ORGANIZED HEALTH CARE EDUCATION/TRAINING PROGRAM

## 2019-10-28 PROCEDURE — 87070 CULTURE OTHR SPECIMN AEROBIC: CPT | Performed by: INTERNAL MEDICINE

## 2019-10-28 PROCEDURE — 12000000 ZZH R&B MED SURG/OB

## 2019-10-28 PROCEDURE — 27210190 US PARACENTESIS

## 2019-10-28 RX ORDER — ALBUMIN (HUMAN) 12.5 G/50ML
12.5 SOLUTION INTRAVENOUS ONCE
Status: DISCONTINUED | OUTPATIENT
Start: 2019-10-28 | End: 2019-10-28

## 2019-10-28 RX ORDER — SPIRONOLACTONE 25 MG/1
50 TABLET ORAL DAILY
Status: DISCONTINUED | OUTPATIENT
Start: 2019-10-28 | End: 2019-10-30 | Stop reason: HOSPADM

## 2019-10-28 RX ORDER — NICOTINE POLACRILEX 4 MG
15-30 LOZENGE BUCCAL
Status: DISCONTINUED | OUTPATIENT
Start: 2019-10-28 | End: 2019-10-30 | Stop reason: HOSPADM

## 2019-10-28 RX ORDER — DEXTROSE MONOHYDRATE 25 G/50ML
25-50 INJECTION, SOLUTION INTRAVENOUS
Status: DISCONTINUED | OUTPATIENT
Start: 2019-10-28 | End: 2019-10-30 | Stop reason: HOSPADM

## 2019-10-28 RX ORDER — HYDROXYZINE HYDROCHLORIDE 25 MG/1
25 TABLET, FILM COATED ORAL EVERY 6 HOURS PRN
Status: DISCONTINUED | OUTPATIENT
Start: 2019-10-28 | End: 2019-10-30 | Stop reason: HOSPADM

## 2019-10-28 RX ORDER — LIDOCAINE HYDROCHLORIDE 10 MG/ML
10 INJECTION, SOLUTION EPIDURAL; INFILTRATION; INTRACAUDAL; PERINEURAL ONCE
Status: COMPLETED | OUTPATIENT
Start: 2019-10-28 | End: 2019-10-28

## 2019-10-28 RX ORDER — FUROSEMIDE 40 MG
40 TABLET ORAL DAILY
Status: DISCONTINUED | OUTPATIENT
Start: 2019-10-28 | End: 2019-10-30 | Stop reason: HOSPADM

## 2019-10-28 RX ADMIN — LIDOCAINE HYDROCHLORIDE 10 ML: 10 INJECTION, SOLUTION EPIDURAL; INFILTRATION; INTRACAUDAL; PERINEURAL at 12:49

## 2019-10-28 RX ADMIN — SPIRONOLACTONE 50 MG: 25 TABLET ORAL at 11:18

## 2019-10-28 RX ADMIN — MULTIPLE VITAMINS W/ MINERALS TAB 1 TABLET: TAB at 09:22

## 2019-10-28 RX ADMIN — TRAZODONE HYDROCHLORIDE 100 MG: 100 TABLET ORAL at 21:10

## 2019-10-28 RX ADMIN — PANTOPRAZOLE SODIUM 40 MG: 40 TABLET, DELAYED RELEASE ORAL at 06:37

## 2019-10-28 RX ADMIN — DULOXETINE HYDROCHLORIDE 60 MG: 30 CAPSULE, DELAYED RELEASE ORAL at 09:22

## 2019-10-28 RX ADMIN — PROPRANOLOL HYDROCHLORIDE 10 MG: 10 TABLET ORAL at 21:10

## 2019-10-28 RX ADMIN — PROPRANOLOL HYDROCHLORIDE 10 MG: 10 TABLET ORAL at 09:22

## 2019-10-28 RX ADMIN — HYDROXYZINE HYDROCHLORIDE 25 MG: 25 TABLET, FILM COATED ORAL at 19:59

## 2019-10-28 RX ADMIN — FLUTICASONE FUROATE AND VILANTEROL TRIFENATATE 1 PUFF: 200; 25 POWDER RESPIRATORY (INHALATION) at 09:22

## 2019-10-28 RX ADMIN — QUETIAPINE FUMARATE 50 MG: 50 TABLET ORAL at 21:10

## 2019-10-28 RX ADMIN — FUROSEMIDE 40 MG: 40 TABLET ORAL at 11:18

## 2019-10-28 RX ADMIN — NICOTINE 1 PATCH: 21 PATCH, EXTENDED RELEASE TRANSDERMAL at 09:32

## 2019-10-28 RX ADMIN — MIRTAZAPINE 15 MG: 15 TABLET, FILM COATED ORAL at 21:10

## 2019-10-28 ASSESSMENT — ACTIVITIES OF DAILY LIVING (ADL)
ADLS_ACUITY_SCORE: 19

## 2019-10-28 NOTE — PLAN OF CARE
Discharge Planner OT   Patient plan for discharge: CD treatment   Current status: Pt ambulated to the bathroom with CGA and walker and transferred to/from the toilet with SBA/CGA. Pt requesting to trial functional mobility without the walker, pt requiring CGA, and demonstrated unsteadiness with no AD. Pt completed LE dressing with CGA while standing to pull underwear up over hips.   Barriers to return to prior living situation: Current level of A for I/ADLs and functional mobility   Recommendations for discharge: Recommend TCU. Pt declines TCU and motivated to return home. If home then A for all I/ADLs and Home OT/PT and Home RN (for medication management).  Rationale for recommendations: Pt is below baseline in I/ADLs and functional mobility and will benefit from skilled OT to address safety and independence in I/ADLs.        Entered by: Mine Andrea 10/28/2019 3:05 PM

## 2019-10-28 NOTE — PROGRESS NOTES
"SPIRITUAL HEALTH SERVICES Progress Note  [facility]    F/U.  Pt reports that he needs to participate in rehab to address his (self-described) \"drinking problem.\"  Pt reflected on the settings, the support and use of the 12-Steps to promote his goal of sobriety/recovery.  SH discussed and affirmed pt's use of these resources to promote his goals.                                                                                                                                            Dez Barbour M.Div., [facility]  Staff   Pager [phone]    "

## 2019-10-28 NOTE — PROGRESS NOTES
[facility]  Hospitalist Progress Note  Dhaval Snyder MD  10/28/2019    Assessment & Plan   Jim Rcihard is a 64 year old male admitted on 10/22/2019. He presents with suicidal ideation and intentional ingestion while intoxicated. Patient has history of alcoholic liver disease, ulcers, esophageal varices, CAD s/p CABG with residual left leg swelling, hypertension, hyperlipidemia, peripheral vascular disease, tobacco use, subdural hematoma, and chronic low back pain who presented to the ED with suicidal ideation and overdose on his blood pressure medications.      Depression  Suicidal ideation - resolved  Anxiety and depression  Intentional Ingestion  Presents with suicidal ideations in setting with significant alcohol abuse, and ingestion of twice the dosing of his blood pressure medications.  -Appreciate psych evaluation  -no longer suicidal and says it was from being intoxicated  -continue home psych medications per psychiatry  Started at hydroxyzine 25 mg every 6 hours PRN for anxiety.     Alcohol Dependence   ALcohol withdrawal with delirium tremens  Alcoholic liver disease  Hepatic encephalopathy  Ascites  EtOH level of 0.40 on admission. Status post paracentesis 10/23 AM with 4 liters removed.  - Appreciate psych evaluation  - Continue CIWA protocol and noticing worsening confusion with some delirium tremens, now improve has some tremors this morning, CIWA around 6-8  - telemetry, thiamine/folate/MV ordered   - Fluid restriction decreased to 1 L this stay given significant ascites  - continue home lactulose  - He is on 40 mg of lasix and 50 of spironolactone I did increase his Spironolactone to 100 mg on 10/26/2019 but his creatinine increased so half to hold Lasix and spironolactone kidney function I will start to improve so we will restart his Lasix 40 mg daily and spironolactone 50 mg daily.  As he did not tolerate the increase in the stools of his spinal lactone.  His abdomen is quite  distended today because of ascites.  We will do ultrasound-guided paracentesis and send fluid for analysis to rule out SBP.  Will be diagnostic and therapeutic tap today.      -CD consult and evaluated the patient.  Most likely will need inpatient treatment.     Essential hypertension  -continue on propranolol, lasix, and spironolactone  -some of his hypertension is likely secondary to withdrawal  Blood pressure on lower side on 10/27/2090, most likely because of increased dose of spinal lactone cut down the dose to 50.     COPD (chronic obstructive pulmonary disease) (H)  JANIS on CPAP  -Stable with no evidence of exacerbation.      PUD  Anemia of chronic Disease  History of duodenal ulcer and esophageal varices:  Last admission was evaluated by GI on 9/25, upper GI endoscopy without evidence of bleeding   - Continue Protonix 40 mg p.o. daily  - Etoh cessation encouraged  - Follow up in GI clinic as directed      Tobacco abuse:  pack a day smoker  -continue 21 mg patch daily  -Encouraged cessation during stay     Depression and Anxiety:  -continue PTA cymbalta, remeron, trazodone, seroquel     Pancytopenia:  -likely secondary to chronic alcoholism (bone marrow suppression) and gi bleed  -CBC stable on 10/24, continue to follow as outpatient to make sure returns to normal off alcohol     Chronic kidney disease stage II- III  Patient baseline creatinine between 0.80-1.0, creatinine slightly increased to 1.24 on 10/27/2019,  Most likely because of increased dose of a Spironolactone and slightly low blood pressure, now the creatinine is trending down and blood pressure stable..  As creatinine improving and trending down we will restart Lasix and Aldactone.      Diet:  Low salt, fluid restriction 1000 ML FLUID  Regular Diet Adult    DVT Prophylaxis: Pneumatic Compression Devices  Leger Catheter: not present  Code Status: DNR/DNI          Disposition Plan     Expected discharge: 1-2 days, once stable      Paracentesis  today.      Interval History   Complains of distended abdomen and some abdominal discomfort.  Wanted to have his hydroxyzine restarted for his anxiety.  Denies any chest pain fever chills nausea vomiting dysuria hematuria at this time.    No other significant event overnight.     -Data reviewed today: I reviewed all new labs and imaging over the last 24 hours. I personally reviewed no images or EKG's today.    Physical Exam   Heart Rate: 58, Blood pressure 121/69, pulse 59, temperature 98.4  F (36.9  C), temperature source Oral, resp. rate 18, weight 82.2 kg (181 lb 3.5 oz), SpO2 99 %.  Vitals:    10/24/19 0347 10/26/19 0600   Weight: 83.7 kg (184 lb 8.4 oz) 82.2 kg (181 lb 3.5 oz)     Vital Signs with Ranges  Temp:  [98.1  F (36.7  C)-98.7  F (37.1  C)] 98.4  F (36.9  C)  Pulse:  [59] 59  Heart Rate:  [58-69] 58  Resp:  [16-18] 18  BP: (116-128)/(61-75) 121/69  SpO2:  [95 %-99 %] 99 %  I/O's Last 24 hours  I/O last 3 completed shifts:  In: 630 [P.O.:630]  Out: 400 [Urine:400]    Constitutional:  no apparent distress, arouses, confused  Respiratory: Clear to auscultation bilaterally, no crackles or wheezing  Cardiovascular: Regular rate and rhythm, normal S1 and S2, and no murmur noted  GI: Normal bowel sounds, soft, mild tender, ascites positive.  Distended  Skin/Integumen: No rashes, no cyanosis, no edema  Other:      Medications   All medications were reviewed.      DULoxetine  60 mg Oral Daily     fluticasone-vilanterol  1 puff Inhalation Daily     furosemide  40 mg Oral Daily     mirtazapine  15 mg Oral At Bedtime     multivitamin w/minerals  1 tablet Oral Daily     nicotine   Transdermal Q8H     nicotine   Transdermal Daily     pantoprazole  40 mg Oral QAM AC     propranolol  10 mg Oral BID     QUEtiapine  50 mg Oral At Bedtime     sodium chloride (PF)  3 mL Intracatheter Q8H     spironolactone  50 mg Oral Daily     traZODone  100 mg Oral At Bedtime        Data   Recent Labs   Lab 10/28/19  0757 10/27/19  0939  10/26/19  1717 10/26/19  0826 10/24/19  0839 10/23/19  0936  10/22/19  2239  10/22/19  1549   WBC  --  4.1  --  3.6* 3.0* 3.1*   < >  --   --  5.6   HGB  --  10.2*  --  10.1* 8.4* 8.2*   < >  --   --  10.1*   MCV  --  99  --  99 100 98   < >  --   --  98   PLT  --  96*  --  104* 63* 62*   < >  --   --  138*   INR  --   --   --   --   --   --   --   --   --  1.13    136  --  134  --  137  --  139   < > 139   POTASSIUM 3.5 3.6 4.1 3.2*  --  3.6  --  3.6   < > 3.7   CHLORIDE 105 104  --  103  --  107  --  107   < > 107   CO2 27 27  --  28  --  23  --  24   < > 22   BUN 28 22  --  14  --  17  --  18   < > 18   CR 1.10 1.24  --  0.99  --  0.80  --  0.86   < > 0.86   ANIONGAP 4 5  --  3  --  7  --  8   < > 10   KEV 8.5 8.3*  --  8.2*  --  7.9*  --  7.4*   < > 7.8*   GLC 96 92  --  121*  --  83  --  101*   < > 72   ALBUMIN  --  2.4*  --   --   --  2.5*  --  2.6*  --  2.7*   PROTTOTAL  --   --   --   --   --  6.4*  --  6.5*  --  7.0   BILITOTAL  --   --   --   --   --  1.9*  --  1.2  --  1.2   ALKPHOS  --   --   --   --   --  266*  --  276*  --  303*   ALT  --   --   --   --   --  32  --  37  --  41   AST  --   --   --   --   --  90*  --  107*  --  116*    < > = values in this interval not displayed.       No results found for this or any previous visit (from the past 24 hour(s)).    Dhaval Snyder MD  Hospitalist/[facility] Medicine.

## 2019-10-28 NOTE — PROGRESS NOTES
RADIOLOGY PROCEDURE NOTE  Patient name: Jim Richard  MRN: [id number]  : 1955    Pre-procedure diagnosis: Ascites  Post-procedure diagnosis: Same    Procedure Date/Time: 2019  1:29 PM  Procedure: Paracentesis  Estimated blood loss: None  Specimen(s) collected with description: Ascites.  The patient tolerated the procedure well with no immediate complications.    See imaging dictation for procedural details and findings.    Provider name: Sebastien Pereira MD  Assistant(s):None

## 2019-10-28 NOTE — PLAN OF CARE
DATE & TIME: 10/27 1900 - 10/28 0730                   Cognitive Concerns/ Orientation : A&Ox4   BEHAVIOR & AGGRESSION TOOL COLOR: Green  CIWA SCORE: 3, 1 & 1. Scoring for tremors            ABNL VS/O2: VSS on RA  MOBILITY: Up with SBA with walker & GB  PAIN MANAGMENT: Denies but c/o itchy eyes. Declined eye drops  DIET: Regular with 1000mL fluid restriction  BOWEL/BLADDER: Continent   ABNL LAB/BG: Hgb 10.2  DRAIN/DEVICES: IV-SL  TELEMETRY RHYTHM: NSR with BBB  SKIN: Bruised and scabs  TESTS/PROCEDURES: N/A  D/C DAY/GOALS/PLACE: Pending   OTHER IMPORTANT INFO: VPM in room

## 2019-10-28 NOTE — PLAN OF CARE
DATE & TIME: 10/28 AM                  Cognitive Concerns/ Orientation : A&Ox4,    BEHAVIOR & AGGRESSION TOOL COLOR: Green   CIWA SCORE: 2/2 for mild tremors              ABNL VS/O2: VSS on RA occ mandi   MOBILITY: Up with SBA walker/gaitbelt, ambulated x2 with staff around nurses station   PAIN MANAGMENT: Denies pain   DIET: Regular, FR of 1000ml   BOWEL/BLADDER: Continent   ABNL LAB/BG: Lytes WNL   DRAIN/DEVICES: PIV SL   TELEMETRY RHYTHM: SR w/ BBB and PAC, Occ Mandi   SKIN: Bruised, scab, Paracentesis site CDI   TESTS/PROCEDURES: Paracentesis done today, 2.5L taken off   D/C DAY/GOALS/PLACE: Discharge pending   OTHER IMPORTANT INFO: Denies SI, VPM continued.

## 2019-10-28 NOTE — PROGRESS NOTES
Received a phone call from Nancie in central intake who states the [place] Police call her and was looking for a missing person. They received a call from Jim's Brother and wanted to file a missing person report. I took to phone # from Nancie Gaffney

## 2019-10-29 ENCOUNTER — APPOINTMENT (OUTPATIENT)
Dept: OCCUPATIONAL THERAPY | Facility: CLINIC | Age: 64
DRG: 897 | End: 2019-10-29
Payer: MEDICARE

## 2019-10-29 LAB
ALBUMIN SERPL-MCNC: 2.6 G/DL (ref 3.4–5)
ANION GAP SERPL CALCULATED.3IONS-SCNC: 5 MMOL/L (ref 3–14)
BASOPHILS # BLD AUTO: 0 10E9/L (ref 0–0.2)
BASOPHILS NFR BLD AUTO: 0.7 %
BUN SERPL-MCNC: 25 MG/DL (ref 7–30)
CALCIUM SERPL-MCNC: 8.6 MG/DL (ref 8.5–10.1)
CHLORIDE SERPL-SCNC: 105 MMOL/L (ref 94–109)
CO2 SERPL-SCNC: 26 MMOL/L (ref 20–32)
CREAT SERPL-MCNC: 1.05 MG/DL (ref 0.66–1.25)
DIFFERENTIAL METHOD BLD: ABNORMAL
EOSINOPHIL # BLD AUTO: 0 10E9/L (ref 0–0.7)
EOSINOPHIL NFR BLD AUTO: 1.3 %
ERYTHROCYTE [DISTWIDTH] IN BLOOD BY AUTOMATED COUNT: 15.6 % (ref 10–15)
GFR SERPL CREATININE-BSD FRML MDRD: 74 ML/MIN/{1.73_M2}
GLUCOSE SERPL-MCNC: 91 MG/DL (ref 70–99)
HCT VFR BLD AUTO: 30.5 % (ref 40–53)
HGB BLD-MCNC: 10 G/DL (ref 13.3–17.7)
IMM GRANULOCYTES # BLD: 0 10E9/L (ref 0–0.4)
IMM GRANULOCYTES NFR BLD: 0 %
LYMPHOCYTES # BLD AUTO: 0.5 10E9/L (ref 0.8–5.3)
LYMPHOCYTES NFR BLD AUTO: 17.3 %
MCH RBC QN AUTO: 32.3 PG (ref 26.5–33)
MCHC RBC AUTO-ENTMCNC: 32.8 G/DL (ref 31.5–36.5)
MCV RBC AUTO: 98 FL (ref 78–100)
MONOCYTES # BLD AUTO: 0.5 10E9/L (ref 0–1.3)
MONOCYTES NFR BLD AUTO: 17 %
NEUTROPHILS # BLD AUTO: 1.9 10E9/L (ref 1.6–8.3)
NEUTROPHILS NFR BLD AUTO: 63.7 %
NRBC # BLD AUTO: 0 10*3/UL
NRBC BLD AUTO-RTO: 1 /100
PHOSPHATE SERPL-MCNC: 3.7 MG/DL (ref 2.5–4.5)
PLATELET # BLD AUTO: 113 10E9/L (ref 150–450)
POTASSIUM SERPL-SCNC: 3.4 MMOL/L (ref 3.4–5.3)
RBC # BLD AUTO: 3.1 10E12/L (ref 4.4–5.9)
SODIUM SERPL-SCNC: 136 MMOL/L (ref 133–144)
WBC # BLD AUTO: 3 10E9/L (ref 4–11)

## 2019-10-29 PROCEDURE — 85025 COMPLETE CBC W/AUTO DIFF WBC: CPT | Performed by: INTERNAL MEDICINE

## 2019-10-29 PROCEDURE — 12000000 ZZH R&B MED SURG/OB

## 2019-10-29 PROCEDURE — 36415 COLL VENOUS BLD VENIPUNCTURE: CPT | Performed by: INTERNAL MEDICINE

## 2019-10-29 PROCEDURE — 99232 SBSQ HOSP IP/OBS MODERATE 35: CPT | Performed by: INTERNAL MEDICINE

## 2019-10-29 PROCEDURE — 25000132 ZZH RX MED GY IP 250 OP 250 PS 637: Mod: GY | Performed by: INTERNAL MEDICINE

## 2019-10-29 PROCEDURE — 25000128 H RX IP 250 OP 636: Performed by: STUDENT IN AN ORGANIZED HEALTH CARE EDUCATION/TRAINING PROGRAM

## 2019-10-29 PROCEDURE — 25000132 ZZH RX MED GY IP 250 OP 250 PS 637: Mod: GY | Performed by: PSYCHIATRY & NEUROLOGY

## 2019-10-29 PROCEDURE — 80069 RENAL FUNCTION PANEL: CPT | Performed by: INTERNAL MEDICINE

## 2019-10-29 PROCEDURE — 25000132 ZZH RX MED GY IP 250 OP 250 PS 637: Mod: GY | Performed by: STUDENT IN AN ORGANIZED HEALTH CARE EDUCATION/TRAINING PROGRAM

## 2019-10-29 PROCEDURE — 97535 SELF CARE MNGMENT TRAINING: CPT | Mod: GO | Performed by: OCCUPATIONAL THERAPY ASSISTANT

## 2019-10-29 RX ADMIN — TRAZODONE HYDROCHLORIDE 100 MG: 100 TABLET ORAL at 21:09

## 2019-10-29 RX ADMIN — SPIRONOLACTONE 50 MG: 25 TABLET ORAL at 09:26

## 2019-10-29 RX ADMIN — ONDANSETRON 4 MG: 4 TABLET, ORALLY DISINTEGRATING ORAL at 11:28

## 2019-10-29 RX ADMIN — QUETIAPINE FUMARATE 50 MG: 50 TABLET ORAL at 21:09

## 2019-10-29 RX ADMIN — HYDROXYZINE HYDROCHLORIDE 25 MG: 25 TABLET, FILM COATED ORAL at 14:18

## 2019-10-29 RX ADMIN — MIRTAZAPINE 15 MG: 15 TABLET, FILM COATED ORAL at 21:09

## 2019-10-29 RX ADMIN — MULTIPLE VITAMINS W/ MINERALS TAB 1 TABLET: TAB at 09:13

## 2019-10-29 RX ADMIN — FLUTICASONE FUROATE AND VILANTEROL TRIFENATATE 1 PUFF: 200; 25 POWDER RESPIRATORY (INHALATION) at 09:13

## 2019-10-29 RX ADMIN — PANTOPRAZOLE SODIUM 40 MG: 40 TABLET, DELAYED RELEASE ORAL at 06:40

## 2019-10-29 RX ADMIN — DULOXETINE HYDROCHLORIDE 60 MG: 30 CAPSULE, DELAYED RELEASE ORAL at 09:13

## 2019-10-29 RX ADMIN — HYDROXYZINE HYDROCHLORIDE 25 MG: 25 TABLET, FILM COATED ORAL at 19:55

## 2019-10-29 RX ADMIN — FUROSEMIDE 40 MG: 40 TABLET ORAL at 09:26

## 2019-10-29 RX ADMIN — NICOTINE 1 PATCH: 21 PATCH, EXTENDED RELEASE TRANSDERMAL at 09:26

## 2019-10-29 ASSESSMENT — ACTIVITIES OF DAILY LIVING (ADL)
ADLS_ACUITY_SCORE: 15

## 2019-10-29 NOTE — PLAN OF CARE
Discharge Planner OT   Patient plan for discharge: CD treatment   Current status: pt sitting up in chair upon OT return for session. Pt participated with medication management assessment. Pt slow with reading and processing instruction on pill bottles to setup for one weeks worth of medication.  Pt was able to correct error after 1 cue given for twice daily meds. Pt completed 4 of 6 medications correctly. Pt stated his roommate would be able to assist with setup to ensure accuracy as needed.   Barriers to return to prior living situation: Current level of A for I/ADLs and functional mobility   Recommendations for discharge: Recommend TCU. Pt declines TCU and motivated to return home. If home then A for all I/ADLs and Home OT/PT and Home RN (for medication management) per plan established by the Occupational Therapist  Rationale for recommendations: Pt is below baseline in I/ADLs and functional mobility and will benefit from skilled OT to address safety and independence in I/ADLs.        Entered by: Krista Dolan 10/29/2019 12:51 PM

## 2019-10-29 NOTE — PLAN OF CARE
DATE & TIME: 10/29 AM                  Cognitive Concerns/ Orientation : A&Ox4    BEHAVIOR & AGGRESSION TOOL COLOR: Green   CIWA SCORE: 2/2 mild tremors        ABNL VS/O2: Armando otherwise VSS on RA   MOBILITY: SBA walker/gaitblet ambulated x2 with staff   PAIN MANAGMENT: C/o chronic low back pain but declined interventions   DIET: Regular - FR 1000ml   BOWEL/BLADDER: Continent, BM this shift, good UOP   ABNL LAB/BG: WBC 3.0, Plt 113  DRAIN/DEVICES: PIV SL   TELEMETRY RHYTHM: SB w/ BBB - Scheduled Propanolol held   SKIN: Bruised, scabs, blanchable redness to coccyx   TESTS/PROCEDURES: NA   D/C DAY/GOALS/PLACE: Discharge pending placement or discharge plan.  OTHER IMPORTANT INFO: Pt has CD following and has referrals to both [facility] and [facility] for inpatient treatment but could be a couple weeks until available bed, so patient might discharge home with home care.        Atarax x1 given for anxiety  Zofran x1 given for nausea this afternoon with complete relief    Psych reconsulted to reassess discharge needs

## 2019-10-29 NOTE — PLAN OF CARE
DATE & TIME: 10/28/2019 0197-3079    Cognitive Concerns/ Orientation : A&O x4   BEHAVIOR & AGGRESSION TOOL COLOR: Green  CIWA SCORE: 2,2- for tremors/anxiety   ABNL VS/O2: VSS on RA  MOBILITY: SBA w/ walker and gate belt. Urinal @bedside for overnight.   PAIN MANAGMENT: denies- atarax given 1x for anxiety  DIET: regular- good appetite, 1,000mL fluid restrict.   BOWEL/BLADDER: WDL  ABNL LAB/BG: NA  DRAIN/DEVICES: PIV SL  TELEMETRY RHYTHM: SD w/ BBB  SKIN: WDL- w/ bruising and scabbing, swelling 2+ bilat lower extrem.   TESTS/PROCEDURES: pericentesis today 2.5L removed  D/C DAY/GOALS/PLACE: pending progress;  discharge to inpatient treatment facility, SW consulted.   OTHER IMPORTANT INFO: OT/PT following- recommending TCU. Pt is ready for treatment and feels motivated. VPM monitor in pt's room.

## 2019-10-29 NOTE — PLAN OF CARE
DATE & TIME: 10/29/19 8149-5205   Cognitive Concerns/ Orientation : A&O x4   BEHAVIOR & AGGRESSION TOOL COLOR: Green  CIWA SCORE: 2, sleeping   ABNL VS/O2: VSS except mandi on room air   MOBILITY: SBA +gb/walker  PAIN MANAGMENT: Chronic lower back pain, declined interventions  DIET: Regular diet, fluid restriction 1000 cc  BOWEL/BLADDER: Continent  ABNL LAB/BG: None  DRAIN/DEVICES: PIV R arm saline locked  TELEMETRY RHYTHM: Sinus mandi with BBB  SKIN: Bruised, scab, Paracentesis site CDI   TESTS/PROCEDURES: None  D/C DAY/GOALS/PLACE: Discharge pending  OTHER IMPORTANT INFO: VPM in room; CD/ OT following

## 2019-10-29 NOTE — PROGRESS NOTES
Care Transition Initial Assessment - SW     Met with: patient  Active Problems:    PAD (peripheral artery disease) (H)    Anxiety and depression    Alcohol withdrawal (H)    Depression    Suicidal ideation    Alcohol abuse    Alcoholic hepatitis without ascites    Essential hypertension    COPD (chronic obstructive pulmonary disease) (H)    JANIS on CPAP       DATA  Lives With: other (see comments)(roommate)   Living Arrangements: house(split entry [facility])   Patient reports roommate works 2-10 pm.  She does prepare the meals.  He uses a pill tray to manage his medications.    Patient has Medicare and a supplement.   Patient met with [facility] and a referral was made to  [facility] program.     Identified issues/concerns regarding health management: Admitted with overdose and feeling suicidal.  Psychiatry consulted and recommends patient transfer directly to a CD program voluntarily or [facility] not [facility]. Psychiatry has not been re-consulted  since the consult on 10/23.  TCU is recommended by OT.    ASSESSMENT  Cognitive Status:  Appears oriented x4.  Concerns to be addressed:  Safe discharge plan.  Recommendations include TCU and then In patient CD treatment.      With patient's history, discharge to home will put patient at risk of relapse.   Patient vasillated during our meeting.  Patient expresses desire for treatment but wants to go home first.    Discussed TCU placement, insurance coverage and location of facilities near his home in [place].  Patient initially expressed interest in the recommendation however by the end of the conversation, he said he wanted to go home and organize some matters and see his dog before entering treatment.    He reports he had been at [facility] about 4 months ago. Did not attend any out patient programming.   Has also been at [facility].  He expressed interest in [facility] and knows he would need a Rule 25.  He also states [facility] is  "best as he needs \"the medical\".   If he goes home, home care would be offered however he plans to attend AA (driving himself) so he would not qualify for home RN, PT or OT.      PLAN  Financial costs for the patient TBD  Patient given options and choices for discharge yes  Patient/family is agreeable to the plan? no   Transportation TBD  Patient Goals and Preferences:.  Patient wants to go home rather than going to TCU while awaiting admission to [facility].  Patient anticipates discharging to:  To Be Determined.         "

## 2019-10-29 NOTE — PROGRESS NOTES
[facility]    HOSPITALIST PROGRESS NOTE :   --------------------------------------------------    Date of Admission:  10/22/2019    Cumulative Summary: Jim Richard is a 64 year old male with past medical history significant for alcoholic liver disease, gastric ulcers, esophageal varices, coronary artery disease a status post CABG with residual left leg swelling, essential hypertension, hyperlipidemia, very peripheral vascular disease, tobacco use, subdural hematoma and chronic low back pain who presented to the hospital with suicidal ideations and overdose on his blood pressure medications.    Assessment & Plan     Active Problems:    Suicidal ideation, resolved  Anxiety and depression  Intentional drug overdose: Patient presented with suicidal ideations in the setting of significant alcohol abuse and intoxication and took twice the dosing of his blood pressure medications.  Patient is no longer suicidal and told that it was at the time of being intoxicated.  Patient has been evaluated by psych and has been recommended to undergo inpatient treatment.    --Continue hydroxyzine 25 mg every 6 hours as needed for anxiety.  --Continue Cymbalta 60 mg p.o. daily  --Continue Remeron 15 mg at bedtime.  --Continue patient on multivitamin p.o. daily.  --Continue patient on Seroquel 50 mg at bedtime.  --Continue patient on trazodone 100 mg p.o. at bedtime.  -- Social workers and care coordinators are working on safe disposition plan, there might be 2-1/2 weeks wait time before he can get the bed in inpatient treatment, unfortunately patient is refusing transitional care unit and at this time is also not agreeable for home health care, will ask psych to reevaluate patient to evaluate if patient will be safe to discharge home versus needs to be committed.  -- Patient has been evaluated by chemical dependency and is recommended to undergo inpatient treatment,  are following.    Alcohol  dependence  Alcohol withdrawal with delirium tremens, resolved  Alcoholic liver disease with ascites: Patient underwent paracentesis on October 23 with 4 L removed, patient also underwent once again therapeutic and diagnostic paracentesis yesterday on October 28th with 2 L of fluid removal.  Hepatic encephalopathy: Patient presented with alcohol level of 0.4 on admission.  Patient is feeling tired, told me he has no energy and also feeling nauseated.  Patient was also noticed to be slightly bradycardic this morning requiring his a.m. dose of propranolol to be held.    --Her CIWA score was 2 yesterday morning, patient has received 25 mg of hydroxyzine in the morning.  --Continue to monitor patient closely, continue patient on thiamine, folic acid and multivitamin.  --Patient is placed on fluid restriction 1 L/day, continue current restriction.  --Continue patient on Aldactone 50 mg p.o. daily.  --Continue patient on Lasix 40 mg p.o. daily.  --His PTA medications include lactulose 30 mls twice daily, does not seem to be receiving at this point.  Will start patient on lactulose.  --Continue patient on Protonix 40 mg p.o. daily.  --We will add holding parameters to propranolol 10 mg p.o. twice daily, probably receiving due to the history of esophageal varices.  --Patient was last evaluated by gastroenterology on September 30, 2019 with the plan to have follow-up with GI clinic in 1 to 2 weeks, does not seem like patient has followed up with GI.  --Discussed with bedside RN regarding administration of antinausea medications.  --Outpatient follow-up with GI if patient is starts to make clinical improvement, if is still not feeling well tomorrow might need to be evaluated by GI during the hospitalization.    Essential hypertension  --Continue propranolol, Lasix and Aldactone his blood pressure seems to be to its lower normal side today.     COPD  Obstructive sleep apnea on CPAP  --Stable with no evidence of  exacerbation    History of peptic ulcer disease  Anemia of chronic disease  History of duodenal ulcer and esophageal varices: Patient was recently admitted to the hospital and was evaluated by GI on September 25 and underwent upper GI endoscopy without any evidence of bleeding.  --Continue patient on Protonix 40 mg p.o. daily.  --Once again alcohol cessation is encouraged.  --Patient will need GI follow-up after the discharge in 2 weeks.    Tobacco abuse: Smokes 1 pack/day  --Continue 21 mg patch daily  --Encourage smoking cessation during this stay.    Pancytopenia: Likely secondary to chronic alcoholism from bone marrow suppression and GI bleed.  Platelets are slowly improving up to 113 this morning.  Hemoglobin is a stable around 10.  --Intermittent CBC check after the discharge.    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 daily.  --Currently tolerating current dose of diuretic, continue to monitor creatinine intermittently.    Diet: Fluid restriction 1000 ML FLUID  Regular Diet Adult    Leger Catheter: not present  DVT Prophylaxis: Pneumatic Compression Devices  Code Status: DNR/DNI    The patient's care was discussed with the Bedside Nurse, Care Coordinator/ and Patient.    Disposition Plan   Expected discharge: 1 to 2 days, CD is recommending inpatient treatment, he is also recommended to undergo rehab until patient goes to inpatient treatment but is refusing short-term rehab.  At this time patient is also not had agreeable for home health care.  Will ask psych to reevaluate patient for proper disposition planning.    Tiff Rodriguez MD, FACP  Text Page (7am -  6pm)    ----------------------------------------------------------------------------------------------------------------------      Interval History   Patient care was assumed this morning , seen and examined , feeling nauseated and have no energy , told me that he usually feels better but today he is feeling really tired and fatigued.  Patient is denying any fever or chills, is not complaining of abdominal pain, he underwent paracentesis yesterday but feels that his abdomen is a starting to be bloated again.    -Data reviewed today: I reviewed all new labs and imaging results over the last 24 hours.    I personally reviewed no images or EKG's today.    Physical Exam   Temp: 98  F (36.7  C) Temp src: Oral BP: 112/65 Pulse: 53 Heart Rate: 54 Resp: 17 SpO2: 96 % O2 Device: None (Room air)    Vitals:    10/24/19 0347 10/26/19 0600 10/29/19 0652   Weight: 83.7 kg (184 lb 8.4 oz) 82.2 kg (181 lb 3.5 oz) 76.7 kg (169 lb)     Vital Signs with Ranges  Temp:  [98  F (36.7  C)-98.1  F (36.7  C)] 98  F (36.7  C)  Pulse:  [53-58] 53  Heart Rate:  [52-60] 54  Resp:  [16-20] 17  BP: (103-132)/(59-75) 112/65  SpO2:  [95 %-99 %] 96 %  I/O last 3 completed shifts:  In: 1080 [P.O.:1080]  Out: -     GENERAL: Alert , awake and oriented. NAD. Conversational, appropriate.   HEENT: Normocephalic. EOMI. No icterus or injection. Nares normal.   LUNGS: Clear to auscultation. No dyspnea at rest.   HEART: Regular rate. Extremities perfused.   ABDOMEN: Soft, nontender, and nondistended. Positive bowel sounds.   EXTREMITIES: No LE edema noted.   NEUROLOGIC: Moves extremities x4 on command. No acute focal neurologic abnormalities noted.     Medications       DULoxetine  60 mg Oral Daily     fluticasone-vilanterol  1 puff Inhalation Daily     furosemide  40 mg Oral Daily     mirtazapine  15 mg Oral At Bedtime     multivitamin w/minerals  1 tablet Oral Daily     nicotine   Transdermal Q8H     nicotine   Transdermal Daily     pantoprazole  40 mg  Oral QAM AC     propranolol  10 mg Oral BID     QUEtiapine  50 mg Oral At Bedtime     sodium chloride (PF)  3 mL Intracatheter Q8H     spironolactone  50 mg Oral Daily     traZODone  100 mg Oral At Bedtime       Data   Recent Labs   Lab 10/29/19  0755 10/28/19  0757 10/27/19  0925  10/26/19  0826  10/23/19  0936  10/22/19  2239  10/22/19  1549   WBC 3.0*  --  4.1  --  3.6*   < > 3.1*   < >  --   --  5.6   HGB 10.0*  --  10.2*  --  10.1*   < > 8.2*   < >  --   --  10.1*   MCV 98  --  99  --  99   < > 98   < >  --   --  98   *  --  96*  --  104*   < > 62*   < >  --   --  138*   INR  --   --   --   --   --   --   --   --   --   --  1.13    136 136  --  134  --  137  --  139   < > 139   POTASSIUM 3.4 3.5 3.6   < > 3.2*  --  3.6  --  3.6   < > 3.7   CHLORIDE 105 105 104  --  103  --  107  --  107   < > 107   CO2 26 27 27  --  28  --  23  --  24   < > 22   BUN 25 28 22  --  14  --  17  --  18   < > 18   CR 1.05 1.10 1.24  --  0.99  --  0.80  --  0.86   < > 0.86   ANIONGAP 5 4 5  --  3  --  7  --  8   < > 10   KEV 8.6 8.5 8.3*  --  8.2*  --  7.9*  --  7.4*   < > 7.8*   GLC 91 96 92  --  121*  --  83  --  101*   < > 72   ALBUMIN 2.6* 2.7* 2.4*  --   --   --  2.5*  --  2.6*  --  2.7*   PROTTOTAL  --   --   --   --   --   --  6.4*  --  6.5*  --  7.0   BILITOTAL  --   --   --   --   --   --  1.9*  --  1.2  --  1.2   ALKPHOS  --   --   --   --   --   --  266*  --  276*  --  303*   ALT  --   --   --   --   --   --  32  --  37  --  41   AST  --   --   --   --   --   --  90*  --  107*  --  116*    < > = values in this interval not displayed.       Imaging:   Recent Results (from the past 24 hour(s))   US Paracentesis    Narrative    ULTRASOUND PARACENTESIS 10/28/2019 1:06 PM     HISTORY: High volume paracentesis with or without diagnostic fluid  analysis with labs to be drawn if ordered.    FINDINGS: Ultrasound was used to evaluate for the presence and best  approach for paracentesis. Written and oral informed consent  was  obtained. A pause for the cause procedure to verify the correct  patient and correct procedure. The skin overlying the right lower  quadrant was prepped and draped in the usual sterile fashion. The  subcutaneous tissues were anesthetized with 10 milliliters of  subcutaneous 1% lidocaine. A catheter was advanced into the peritoneal  space and 2.5 L of  straw colored fluid was drained. There were no  immediate complications. Ultrasound images were permanently stored.   Patient left the ultrasound suite in satisfactory condition.      Impression    IMPRESSION: Technically successful paracentesis without immediate  complications.    TACO LINDA MD

## 2019-10-29 NOTE — CONSULTS
Care Transition Initial Assessment - SW     Met with: patient  Active Problems:    PAD (peripheral artery disease) (H)    Anxiety and depression    Alcohol withdrawal (H)    Depression    Suicidal ideation    Alcohol abuse    Alcoholic hepatitis without ascites    Essential hypertension    COPD (chronic obstructive pulmonary disease) (H)    JANIS on CPAP       DATA  Lives With: other (see comments)(roommate)   Living Arrangements: house(split entry [facility])   Patient reports roommate works 2-10 pm.  She does prepare the meals.  He uses a pill tray to manage his medications.    Patient has Medicare and a supplement.   Patient met with [facility] and a referral was made to  [facility] [employer] [facility] [email or web address] In patient program.      Identified issues/concerns regarding health management: Admitted with overdose and feeling suicidal.  Psychiatry consulted and recommends patient transfer directly to a CD program voluntarily or if not Dr [facility] planned [facility]. Psychiatry has not been re-consulted  since the consult on 10/23.  TCU is recommended by OT.     ASSESSMENT  Cognitive Status:  Appears oriented x4.  Concerns to be addressed:  Safe discharge plan.  Recommendations include TCU and then In patient CD treatment.      With patient's history, discharge to home will put patient at risk of relapse.   Patient vasillated during our meeting.  Patient expresses desire for treatment but wants to go home first.    Discussed TCU placement, insurance coverage and location of facilities near his home in [facility] [place].  Patient initially expressed interest in the recommendation however by the end of the conversation, he said he wanted to go home and organize some matters and see his dog before entering treatment.    He reports he had been at [facility] about 4 months ago. Did not attend any out patient programming.   Has also been at [facility].  He expressed interest in [facility] and knows he would need a Rule 25.  He also states [facility] is  "best as he needs \"the medical\".   If he goes home, home care would be offered however he plans to attend AA (driving himself) so he would not qualify for home RN, PT or OT.       PLAN  Financial costs for the patient TBD  Patient given options and choices for discharge yes  Patient/family is agreeable to the plan? no   Transportation TBD  Patient Goals and Preferences:.  Patient wants to go home rather than going to TCU while awaiting admission to [facility].  Patient anticipates discharging to:  To Be Determined.                    "

## 2019-10-29 NOTE — PLAN OF CARE
OT: pt in bed, refused participation with OT at this time secondary to c/o nausea, requested OT to attempt back later as able.

## 2019-10-30 ENCOUNTER — APPOINTMENT (OUTPATIENT)
Dept: OCCUPATIONAL THERAPY | Facility: CLINIC | Age: 64
DRG: 897 | End: 2019-10-30
Payer: MEDICARE

## 2019-10-30 VITALS
SYSTOLIC BLOOD PRESSURE: 110 MMHG | BODY MASS INDEX: 26.88 KG/M2 | WEIGHT: 171.6 LBS | RESPIRATION RATE: 16 BRPM | HEART RATE: 64 BPM | TEMPERATURE: 97.7 F | OXYGEN SATURATION: 100 % | DIASTOLIC BLOOD PRESSURE: 60 MMHG

## 2019-10-30 PROCEDURE — 97530 THERAPEUTIC ACTIVITIES: CPT | Mod: GO | Performed by: OCCUPATIONAL THERAPY ASSISTANT

## 2019-10-30 PROCEDURE — 25000132 ZZH RX MED GY IP 250 OP 250 PS 637: Mod: GY | Performed by: STUDENT IN AN ORGANIZED HEALTH CARE EDUCATION/TRAINING PROGRAM

## 2019-10-30 PROCEDURE — 25000132 ZZH RX MED GY IP 250 OP 250 PS 637: Mod: GY | Performed by: INTERNAL MEDICINE

## 2019-10-30 PROCEDURE — 25000132 ZZH RX MED GY IP 250 OP 250 PS 637: Mod: GY | Performed by: PSYCHIATRY & NEUROLOGY

## 2019-10-30 PROCEDURE — 99207 ZZC CDG-CODE INCORRECT PER BILLING BASED ON TIME: CPT | Performed by: INTERNAL MEDICINE

## 2019-10-30 PROCEDURE — 97535 SELF CARE MNGMENT TRAINING: CPT | Mod: GO | Performed by: OCCUPATIONAL THERAPY ASSISTANT

## 2019-10-30 PROCEDURE — 99238 HOSP IP/OBS DSCHRG MGMT 30/<: CPT | Performed by: INTERNAL MEDICINE

## 2019-10-30 RX ORDER — DULOXETIN HYDROCHLORIDE 60 MG/1
60 CAPSULE, DELAYED RELEASE ORAL DAILY
Qty: 30 CAPSULE | Refills: 0 | Status: ON HOLD | OUTPATIENT
Start: 2019-10-30 | End: 2020-01-15

## 2019-10-30 RX ORDER — TRAZODONE HYDROCHLORIDE 100 MG/1
200 TABLET ORAL AT BEDTIME
Qty: 60 TABLET | Refills: 0 | Status: ON HOLD | OUTPATIENT
Start: 2019-10-30 | End: 2020-01-15

## 2019-10-30 RX ORDER — LACTULOSE 10 G/15ML
20 SOLUTION ORAL 2 TIMES DAILY
Qty: 1800 ML | Refills: 0 | Status: SHIPPED | OUTPATIENT
Start: 2019-10-30 | End: 2020-01-07

## 2019-10-30 RX ORDER — LANOLIN ALCOHOL/MO/W.PET/CERES
100 CREAM (GRAM) TOPICAL DAILY
Qty: 30 TABLET | Refills: 0 | Status: ON HOLD | OUTPATIENT
Start: 2019-10-30 | End: 2020-02-26

## 2019-10-30 RX ORDER — MIRTAZAPINE 15 MG/1
15 TABLET, FILM COATED ORAL AT BEDTIME
Qty: 30 TABLET | Refills: 0 | Status: ON HOLD | OUTPATIENT
Start: 2019-10-30 | End: 2020-04-08

## 2019-10-30 RX ORDER — PANTOPRAZOLE SODIUM 40 MG/1
40 TABLET, DELAYED RELEASE ORAL
Qty: 30 TABLET | Refills: 0 | Status: SHIPPED | OUTPATIENT
Start: 2019-10-30 | End: 2020-03-07

## 2019-10-30 RX ORDER — PROPRANOLOL HYDROCHLORIDE 10 MG/1
10 TABLET ORAL 2 TIMES DAILY
Qty: 60 TABLET | Refills: 0 | Status: SHIPPED | OUTPATIENT
Start: 2019-10-30 | End: 2020-03-07

## 2019-10-30 RX ORDER — FUROSEMIDE 40 MG
40 TABLET ORAL DAILY
Qty: 30 TABLET | Refills: 0 | Status: ON HOLD | OUTPATIENT
Start: 2019-10-30 | End: 2020-01-15

## 2019-10-30 RX ORDER — FOLIC ACID 1 MG/1
1 TABLET ORAL DAILY
Qty: 30 TABLET | Refills: 0 | Status: ON HOLD | OUTPATIENT
Start: 2019-10-30 | End: 2020-04-08

## 2019-10-30 RX ORDER — QUETIAPINE FUMARATE 50 MG/1
50 TABLET, FILM COATED ORAL AT BEDTIME
Qty: 30 TABLET | Refills: 0 | Status: ON HOLD | OUTPATIENT
Start: 2019-10-30 | End: 2020-01-15

## 2019-10-30 RX ADMIN — FLUTICASONE FUROATE AND VILANTEROL TRIFENATATE 1 PUFF: 200; 25 POWDER RESPIRATORY (INHALATION) at 08:23

## 2019-10-30 RX ADMIN — DULOXETINE HYDROCHLORIDE 60 MG: 30 CAPSULE, DELAYED RELEASE ORAL at 08:22

## 2019-10-30 RX ADMIN — MULTIPLE VITAMINS W/ MINERALS TAB 1 TABLET: TAB at 08:22

## 2019-10-30 RX ADMIN — HYDROXYZINE HYDROCHLORIDE 25 MG: 25 TABLET, FILM COATED ORAL at 09:19

## 2019-10-30 RX ADMIN — PROPRANOLOL HYDROCHLORIDE 10 MG: 10 TABLET ORAL at 08:22

## 2019-10-30 RX ADMIN — NICOTINE 1 PATCH: 21 PATCH, EXTENDED RELEASE TRANSDERMAL at 08:26

## 2019-10-30 RX ADMIN — FUROSEMIDE 40 MG: 40 TABLET ORAL at 08:22

## 2019-10-30 RX ADMIN — PANTOPRAZOLE SODIUM 40 MG: 40 TABLET, DELAYED RELEASE ORAL at 06:36

## 2019-10-30 RX ADMIN — SPIRONOLACTONE 50 MG: 25 TABLET ORAL at 08:22

## 2019-10-30 ASSESSMENT — ACTIVITIES OF DAILY LIVING (ADL)
ADLS_ACUITY_SCORE: 16
ADLS_ACUITY_SCORE: 15

## 2019-10-30 NOTE — PROGRESS NOTES
CLINICAL NUTRITION SERVICES - REASSESSMENT NOTE    Malnutrition: (10/30)  % Weight Loss:  > 7.5% in 3 months (severe malnutrition)  % Intake:  Decreased intake does not meet criteria for malnutrition   Subcutaneous Fat Loss:  None observed  Muscle Loss:  None observed  Fluid Retention:  Trace    Malnutrition Diagnosis: Patient does not meet two of the above criteria necessary for diagnosing malnutrition     EVALUATION OF PROGRESS TOWARD GOALS   Diet:  Regular   FR 1000 mL daily   Intake/Tolerance:  Pt eating 100% of meals since admission. Orders adequate meals TID.     ASSESSED NUTRITION NEEDS:  Dosing Weight: 77 kg  Estimated Energy Needs: 9732-8671 kcal/day (25-30 Kcal/Kg)  Justification: maintenance  Estimated Protein Needs: 77-92 g protein/day (1-1.2 g pro/Kg)  Justification: preservation of lean body mass  Estimated Fluid Needs: Per MD    NEW FINDINGS:   - Possible discharge today, no commitment planned per psych  - Weight Trending: Possible 16# loss since 8/13 (8.6% in 2 months).   Vitals:    10/24/19 0347 10/26/19 0600 10/29/19 0652 10/30/19 0600   Weight: 83.7 kg (184 lb 8.4 oz) 82.2 kg (181 lb 3.5 oz) 76.7 kg (169 lb) 77.8 kg (171 lb 9.6 oz)     Wt Readings from Last 10 Encounters:   10/30/19 77.8 kg (171 lb 9.6 oz)   10/01/19 75.8 kg (167 lb)   08/13/19 84 kg (185 lb 3 oz)   07/10/19 90.7 kg (200 lb)   07/03/19 90.7 kg (200 lb)   07/03/19 90.7 kg (200 lb)   02/13/19 97.5 kg (215 lb)   01/21/19 90.7 kg (200 lb)   12/24/18 99.8 kg (220 lb)   12/05/18 99.8 kg (220 lb)     Previous Goals:   Intake of 75% meals TID or greater.  Evaluation: Met    Previous Nutrition Diagnosis:   Predicted inadequate nutrient intake (energy/protein) related to potential for distention in abdomen to impact appetite/tolerance for adequate portions.  Evaluation: Completed, updated below     MALNUTRITION  % Weight Loss:  > 7.5% in 3 months (severe malnutrition)  % Intake:  Decreased intake does not meet criteria for malnutrition    Subcutaneous Fat Loss:  None observed  Muscle Loss:  None observed  Fluid Retention:  Trace    Malnutrition Diagnosis: Patient does not meet two of the above criteria necessary for diagnosing malnutrition    CURRENT NUTRITION DIAGNOSIS  Unintended weight loss related to suspected inadequate protein/calorie intake prior to admission as evidenced by up to a 8.6% weight loss in the past 2-3 months.     INTERVENTIONS  Recommendations / Nutrition Prescription  Continue diet, FR per MD    Implementation  None new today    Goals  Intake of >/=75% meals TID.       MONITORING AND EVALUATION:  Progress towards goals will be monitored and evaluated per protocol and Practice Guidelines    Summer Isaac RD, LD  [facility] [place], 65, 55, MH   Pager: [phone]  Weekend Pager: [phone]

## 2019-10-30 NOTE — CONSULTS
[facility] Psychiatric Consult Progress Note    Interval History:   Pt seen, chart reviewed, case discussed with nursing staff and treating clinicians.  I met with Jim this morning on station 66.  He has cleared withdrawal, talks about wanting to go home.  He expresses some interest in going to treatment, but realistically there are no viable options.  The Medicare based programs are at least 2 weeks out.  Medically he is stabilizing.  He is oriented, not suicidal.  At this point we could file commitment, but I do not think it is going to accomplish anything-he will sit here for weeks and weeks awaiting disposition.  He was just in treatment, his chances for recovery and making use of treatment are slim.  He is refusing transitional care, talks about wanting to go to AA.  His labs have shown some improvement.     Review of systems:   10 point Review of Systems completed by Dr. Davis, and is  is negative other than noted in the HPI     Medications:       DULoxetine  60 mg Oral Daily     fluticasone-vilanterol  1 puff Inhalation Daily     furosemide  40 mg Oral Daily     mirtazapine  15 mg Oral At Bedtime     multivitamin w/minerals  1 tablet Oral Daily     nicotine   Transdermal Q8H     nicotine   Transdermal Daily     pantoprazole  40 mg Oral QAM AC     propranolol  10 mg Oral BID     QUEtiapine  50 mg Oral At Bedtime     sodium chloride (PF)  3 mL Intracatheter Q8H     spironolactone  50 mg Oral Daily     traZODone  100 mg Oral At Bedtime     glucose **OR** dextrose **OR** glucagon, hydrOXYzine, hypromellose-dextran, lidocaine 4%, lidocaine (buffered or not buffered), LORazepam **OR** LORazepam, magnesium sulfate, melatonin, miconazole, naloxone, nicotine, ondansetron **OR** ondansetron, potassium chloride, potassium chloride with lidocaine, potassium chloride, potassium chloride, QUEtiapine, sodium chloride (PF), sodium phosphate, sodium phosphate, sodium phosphate    Mental Status  Examination:     Appearance:  awake, alert, dressed in hospital scrubs, appeared older than stated age and poorly groomed  Eye Contact:  fair  Speech:  clear, coherent  Language:Normal  Psychomotor Behavior:  fidgeting  Mood:  better  Affect:  intensity is flat  Thought Process:  logical, linear and goal oriented no loose associations  Thought Content:  no evidence of suicidal ideation or homicidal ideation and no evidence of psychotic thought  Oriented to:  time, person, and place  Attention Span and Concentration:  intact  Recent and Remote Memory:  fair  Fund of Knowledge: appropriate  Muscle Strength and Tone: decreased  Gait and Station: Normal  Insight:  limited  Judgment:  limited        Labs/Vitals:     Recent Results (from the past 24 hour(s))   CBC with platelets differential    Collection Time: 10/29/19  7:55 AM   Result Value Ref Range    WBC 3.0 (L) 4.0 - 11.0 10e9/L    RBC Count 3.10 (L) 4.4 - 5.9 10e12/L    Hemoglobin 10.0 (L) 13.3 - 17.7 g/dL    Hematocrit 30.5 (L) 40.0 - 53.0 %    MCV 98 78 - 100 fl    MCH 32.3 26.5 - 33.0 pg    MCHC 32.8 31.5 - 36.5 g/dL    RDW 15.6 (H) 10.0 - 15.0 %    Platelet Count 113 (L) 150 - 450 10e9/L    Diff Method Automated Method     % Neutrophils 63.7 %    % Lymphocytes 17.3 %    % Monocytes 17.0 %    % Eosinophils 1.3 %    % Basophils 0.7 %    % Immature Granulocytes 0.0 %    Nucleated RBCs 1 (H) 0 /100    Absolute Neutrophil 1.9 1.6 - 8.3 10e9/L    Absolute Lymphocytes 0.5 (L) 0.8 - 5.3 10e9/L    Absolute Monocytes 0.5 0.0 - 1.3 10e9/L    Absolute Eosinophils 0.0 0.0 - 0.7 10e9/L    Absolute Basophils 0.0 0.0 - 0.2 10e9/L    Abs Immature Granulocytes 0.0 0 - 0.4 10e9/L    Absolute Nucleated RBC 0.0    Renal panel    Collection Time: 10/29/19  7:55 AM   Result Value Ref Range    Sodium 136 133 - 144 mmol/L    Potassium 3.4 3.4 - 5.3 mmol/L    Chloride 105 94 - 109 mmol/L    Carbon Dioxide 26 20 - 32 mmol/L    Anion Gap 5 3 - 14 mmol/L    Glucose 91 70 - 99 mg/dL     Urea Nitrogen 25 7 - 30 mg/dL    Creatinine 1.05 0.66 - 1.25 mg/dL    GFR Estimate 74 >60 mL/min/[1.73_m2]    GFR Estimate If Black 86 >60 mL/min/[1.73_m2]    Calcium 8.6 8.5 - 10.1 mg/dL    Phosphorus 3.7 2.5 - 4.5 mg/dL    Albumin 2.6 (L) 3.4 - 5.0 g/dL     B/P: 110/60, T: 97.7, P: 64, R: 16    Impression:   Jim presents with severe alcohol use issues unrelated medical problems.  There just are not any easy options for him, no readily available access to a Medicare based program.  He wants to discharge home, at this point I would discharge him AGAINST MEDICAL ADVICE.  I am choosing not to file commitment at this point since his medical situation has improved.      DIagnoses:   1.  Alcohol use disorder, severe  2.  Multiple medical complications related to alcohol use  3.  Alcohol withdrawal, resolved  4.  Major depressive disorder by history         Plan:   1. Written information given on medications. Side effects, risks, benefits reviewed.  2.  Continue current psych meds including Cymbalta and Remeron  3.  We had a long discussion about going into treatment directly, he is refusing and wants to go home.  I would discharge him AGAINST MEDICAL ADVICE  4.  At this point I am choosing not to follow commitment as there are no viable options to get him into treatment, no readily available Medicare based programs available      Attestation:  Patient has been seen and evaluated by me,  Donell Davis MD

## 2019-10-30 NOTE — PROGRESS NOTES
SW:  D:  Prior to discharge reviewed with Merissa at [facility] that she had received the CD referral.  Writer will be faxing medical information also to [facility] at her request.  She will follow up with patient to complete the program screen and if patient is accepted he will be called when they have a vacancy.   Reviewed with patient.  He plans to attend AA daily and reports he may look into other programs depending on how he does at home.  Arranged a taxi thru the hospital account system as he had no transport and he didn't feel he needed medivan transport.

## 2019-10-30 NOTE — PLAN OF CARE
Discharge    Patient discharged to home via cab.  Care plan note:  Patient is A&Ox4; anxious at times but has been cooperative.  Atarax given x1 today which helped.  VSS; tele SR with [facility].  O2sats 100% on RA; no N/V/SOB.  Discharge instructions were provided.  Patient verbalized understanding of all information received.       Listed belongings gathered and returned to patient. Yes  Care Plan and Patient education resolved: Yes  Prescriptions if needed, hard copies sent with patient  YEIMY - Pharmacy [facility] on [address], [place] - patient updated.  Home and hospital acquired medications returned to patient: Yes  Medication Bin checked and emptied on discharge Yes  Follow up appointment made for patient: Yes

## 2019-10-30 NOTE — PLAN OF CARE
DATE & TIME: 10/29/2019 4575-6205                       Cognitive Concerns/ Orientation : A&O x4   BEHAVIOR & AGGRESSION TOOL COLOR: Green  CIWA SCORE: N/A       ABNL VS/O2: VSS on RA  MOBILITY: SBA w/ walker and gate belt   PAIN MANAGMENT: pt denies pain, no medications given this shift  DIET: Regular, 1L fluid restriction  BOWEL/BLADDER: continent of B&B   ABNL LAB/BG: albumin:2.6, WBC:3, hgb:10  DRAIN/DEVICES: PIV in R arm that is S/L  TELEMETRY RHYTHM: sinus bradycardia w/ BBB  SKIN: some bruises and scabs  TESTS/PROCEDURES: none during shift  D/C DAY/GOALS/PLACE: waiting to hear about discharge date and plan  OTHER IMPORTANT INFO: pt recommended for inpatient treatment, possibly at [facility]. 2+weeks to get in, so need to have psych reeval to see if he would be ok with going home before treatment or if he need to go to a TCU, but pt declining TCU at this time.

## 2019-10-30 NOTE — DISCHARGE SUMMARY
[facility]  Hospitalist Discharge Summary       Date of Admission:  10/22/2019  Date of Discharge:  10/30/2019  Discharging Provider: Tiff Rodriguez MD    Discharge Diagnoses   Suicidal ideation, resolved.  Anxiety depression.  Intentional drug overdose, resolved.  Alcohol dependence.  Alcohol withdrawal with delirium tremens, resolved.  Alcoholic liver disease with ascites, stable.  Hepatic encephalopathy.  Essential hypertension.  COPD.  Obstructive sleep apnea on CPAP.  History of peptic ulcer disease.  Anemia of chronic disease.  History of duodenal ulcer and esophageal varices.  Tobacco abuse.  Pancytopenia.  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    Follow-ups Needed After Discharge   Follow-up Appointments     Follow-up and recommended labs and tests      Follow up with primary care provider, FERNANDA ELIAS, within 7 days   to evaluate treatment change and for hospital follow- up.  The following   labs/tests are recommended: CMP.  Follow up with Liver clinic with [facility] .         Follow-up and recommended labs and tests       Follow up with primary care provider, Dr Fernanda Elias, as   scheduled for you on Monday 11/4/19 at 1:20PM, for hospital follow- up.      Please bring your discharge papers with you.             Unresulted Labs Ordered in the Past 30 Days of this Admission     Date and Time Order Name Status Description    10/28/2019 1041 Fluid Culture Aerobic Bacterial Preliminary     9/26/2019 0305 Potassium In process       These results will be followed up by PCP    Discharge Disposition   Discharged to home  Condition at discharge: Stable    Hospital Course   Cumulative Summary: Jim Richard is a 64 year old male with past medical history significant for alcoholic liver disease, gastric ulcers, esophageal varices, coronary artery disease a status post CABG with residual left leg swelling, essential hypertension, hyperlipidemia, very peripheral vascular disease,  tobacco use, subdural hematoma and chronic low back pain who presented to the hospital with suicidal ideations and overdose on his blood pressure medications.    Here are further details regarding his current hospitalization.  Suicidal ideation, resolved  Anxiety and depression  Intentional drug overdose: Patient presented with suicidal ideations in the setting of significant alcohol abuse and intoxication and took twice the dosing of his blood pressure medications.  Patient is no longer suicidal and told that it was at the time of being intoxicated. Patient has been evaluated by psych and has been recommended to undergo inpatient treatment.     --Continue Cymbalta 60 mg p.o. daily, prescription given for a month.  --Continue Remeron 15 mg at bedtime., script given for month   --Continue patient on multivitamin p.o. daily, patient does have at home   --Continue patient on Seroquel 50 mg at bedtime, script given for a month  --Continue patient on trazodone 100 mg p.o. at bedtime, patient was taking 200 mg at bedtime PTA , script was given for a month  -- Social workers and care coordinators are working on safe disposition plan, there might be 2-1/2 weeks wait time before he can get the bed in inpatient treatment, patient is refusing TCU, was evaluated by Psych this morning and at this time they do not recommend placing patient on commitment as patient will not have any place to go for next few weeks.  -- patient is agreeable to go to inpatient treatment once they have bed for him  -- I reviewed all his home medications with him and gave him script for most of his home med's also which he was not sure of for one month with plan to have follow up with PCP next Monday  -- On reviewing his chart , in the past patient was following up with hepatology with [facility] , will schedule appointment to follow up with them in December.  -- Patient has been evaluated by chemical dependency and is recommended to undergo inpatient  treatment, plan for admission in couple of weeks.     Alcohol dependence  Alcohol withdrawal with delirium tremens, resolved  Alcoholic liver disease with ascites: Patient underwent paracentesis on October 23 with 4 L removed, patient also underwent once again therapeutic and diagnostic paracentesis yesterday on October 28th with 2 L of fluid removal.  Hepatic encephalopathy: Patient presented with alcohol level of 0.4 on admission.  Patient is feeling tired, told me he has no energy and also feeling nauseated. Patient was also noticed to be slightly bradycardic this morning requiring his a.m. dose of propranolol to be held.       --Discharge on thiamine, folic acid and multivitamin.  --Continue patient on Aldactone 50 mg p.o. daily, does have at home   --Continue patient on Lasix 40 mg p.o. daily, script given  --His PTA medications include lactulose 30 mls twice daily,continue on discharge.  --Continue patient on Protonix 40 mg p.o. daily, script given.  -- Continue Propranolol 10 mg p.o. twice daily on discharge   -- As above , plan for follow up with GI     Essential hypertension  --Continue propranolol, Lasix and Aldactone      COPD  Obstructive sleep apnea on CPAP  --Stable with no evidence of exacerbation     History of peptic ulcer disease  Anemia of chronic disease  History of duodenal ulcer and esophageal varices: Patient was recently admitted to the hospital and was evaluated by GI on September 25 and underwent upper GI endoscopy without any evidence of bleeding.  --Continue patient on Protonix 40 mg p.o. daily.  --Once again alcohol cessation is encouraged.  --Patient will need GI follow-up after the discharge      Tobacco abuse: Smokes 1 pack/day  --Continue 21 mg patch daily, patient does have patches at home   --Encourage smoking cessation during this stay.     Pancytopenia: Likely secondary to chronic alcoholism from bone marrow suppression and GI bleed.  Platelets are slowly improving up to 113 this  morning.  Hemoglobin is a stable around 10.  --Intermittent CBC check after the discharge.     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 daily.  --Currently tolerating current dose of diuretic, continue to monitor creatinine intermittently.    Patient was seen and examined on the day of discharge , he is feeling well, does not have any complaints , I did review the discharge medications and instructions with the patient and plan for him to follow up with the PCP after the hospitalization .patient was in agreement , he is discharged in stable condition back to his home.    Consultations This Hospital Stay   PSYCHIATRY IP CONSULT  CHEMICAL DEPENDENCY IP CONSULT  PHYSICAL THERAPY ADULT IP CONSULT  OCCUPATIONAL THERAPY ADULT IP CONSULT  PSYCHIATRY IP CONSULT    Code Status   Full Code    Time Spent on this Encounter   I, Tiff Rodriguez MD, personally saw the patient today and spent less than or equal to 30 minutes discharging this patient.       Tiff Rodriguez MD, FACP  [facility]  ______________________________________________________________________    Physical Exam   Vital Signs: Temp: 97.7  F (36.5  C) Temp src: Oral BP: 110/60 Pulse: 64 Heart Rate: 63 Resp: 16 SpO2: 100 % O2 Device: None (Room air)    Weight: 171 lbs 9.6 oz    Physical Exam  Vitals signs and nursing note reviewed.   Constitutional:       Appearance: He is well-developed.   HENT:      Head: Normocephalic and atraumatic.   Eyes:      Pupils: Pupils are equal, round, and reactive to light.   Neck:      Musculoskeletal: Normal range of motion and neck supple.      Thyroid: No thyromegaly.   Cardiovascular:      Rate and Rhythm: Normal rate and regular rhythm.      Heart sounds: Normal heart  sounds.   Pulmonary:      Effort: Pulmonary effort is normal. No respiratory distress.      Breath sounds: Normal breath sounds.   Abdominal:      General: Bowel sounds are normal. There is no distension.      Palpations: Abdomen is soft.   Musculoskeletal: Normal range of motion.         General: No tenderness.   Skin:     General: Skin is warm and dry.   Neurological:      Mental Status: He is alert and oriented to person, place, and time.   Psychiatric:         Behavior: Behavior normal.          Primary Care Physician   FERNANDA ELAIS    Discharge Orders      Follow-up and recommended labs and tests     Follow up with primary care provider, Dr Fernanda Elias, as scheduled for you on Monday 11/4/19 at 1:20PM, for hospital follow- up.    Please bring your discharge papers with you.     Reason for your hospital stay    You were admitted to the hospital secondary to alcohol intoxication and withdrawal , you are recommended to go to inpatient treatment , you are expected to get bed in couple of weeks.     Follow-up and recommended labs and tests    Follow up with primary care provider, FERNANDA ELIAS, within 7 days to evaluate treatment change and for hospital follow- up.  The following labs/tests are recommended: CMP.  Follow up with Liver clinic with [facility] .     Activity    Your activity upon discharge: activity as tolerated and no driving for today     Discharge Instructions    Please stay abstinent from ETOH , please continue to take all your medications , please keep your appointment with your PCP and gastroenterology  Please take all your medication bottles with you as you will need refills on your medications     Full Code     Diet    Follow this diet upon discharge: Orders Placed This Encounter      Fluid restriction 1000 ML FLUID      Regular Diet Adult         Significant Results and Procedures   Results for orders placed or performed during the hospital encounter of 10/22/19   US Paracentesis     Narrative    PROCEDURE(S):   Ultrasound-guided paracentesis     DATE OF PROCEDURE:  10/23/2019 8:57 AM    OPERATORS:    Anil Boyce MD    COMPLICATIONS:   None     SPECIMENS TO LAB:  None    MEDICATION(S):  Lidocaine 1% SQ    CONTRAST:   None    REFERENCED AIR KERMA: 0 mGy  FLUOROSCOPY TIME: 0 minutes    ESTIMATED BLOOD LOSS:   Minimal    PRE-PROCEDURE DIAGNOSIS: Ascites  POST-PROCEDURE DIAGNOSIS: Same    CLINICAL HISTORY/INDICATION:  64-year-old male with symptomatic large volume recurrent ascites  presents for diagnostic and therapeutic paracentesis.    PROCEDURE AND FINDINGS:   Following a discussion of the risks, benefits, indications and  alternatives to treatment, appropriate informed consent was obtained.  The patient was brought to the Ultrasound suite and placed supine on  the table. The right abdomen and flank were prepped and draped in a  sterile fashion. A time out was performed per hospital universal  protocol policy to ensure correct patient, site and procedure to be  performed.     Preliminary sonography of the right abdomen was performed and  demonstrates a large volume of ascites.  An ultrasound image was  archived. Local anesthesia was achieved with 1% lidocaine. A one-step  catheter was advanced into the peritoneal space under direct  sonographic guidance with visualization of needle tip entry into the  peritoneal fluid and return of clear yellow ascites. A total of 4250  mL of ascites was then removed. The catheter was removed uneventfully,  a sterile dressing applied.     Throughout the procedure, the patient was monitored by a radiology  nurse for heart rate, blood pressure and oxygen saturation which  remained stable. The patient tolerated the procedure well and left  interventional radiology in stable condition.       Impression    IMPRESSION:   Ultrasound-guided paracentesis.     ANIL BOYCE MD   US Paracentesis    Narrative    ULTRASOUND PARACENTESIS 10/28/2019 1:06 PM     HISTORY:  High volume paracentesis with or without diagnostic fluid  analysis with labs to be drawn if ordered.    FINDINGS: Ultrasound was used to evaluate for the presence and best  approach for paracentesis. Written and oral informed consent was  obtained. A pause for the cause procedure to verify the correct  patient and correct procedure. The skin overlying the right lower  quadrant was prepped and draped in the usual sterile fashion. The  subcutaneous tissues were anesthetized with 10 milliliters of  subcutaneous 1% lidocaine. A catheter was advanced into the peritoneal  space and 2.5 L of  straw colored fluid was drained. There were no  immediate complications. Ultrasound images were permanently stored.   Patient left the ultrasound suite in satisfactory condition.      Impression    IMPRESSION: Technically successful paracentesis without immediate  complications.    TACO LINDA MD       Discharge Medications   Current Discharge Medication List      CONTINUE these medications which have CHANGED    Details   DULoxetine (CYMBALTA) 60 MG capsule Take 1 capsule (60 mg) by mouth daily  Qty: 30 capsule, Refills: 0    Comments: Future refills by PCP Dr. FERNANDA BRANTLEY with phone number [phone].  Associated Diagnoses: 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)      folic acid (FOLVITE) 1 MG tablet Take 1 tablet (1 mg) by mouth daily  Qty: 30 tablet, Refills: 0    Comments: Future refills by PCP Dr. FERNANDA BRANTLEY with phone number [phone].  Associated Diagnoses: Alcoholic cirrhosis of liver with ascites (H)      furosemide (LASIX) 40 MG tablet Take 1 tablet (40 mg) by mouth daily  Qty: 30 tablet, Refills: 0    Comments: Future refills by PCP Dr. FERNANDA BRANTLEY with phone number [phone].  Associated Diagnoses: Alcoholic cirrhosis of liver with ascites (H)      lactulose (CHRONULAC) 10 GM/15ML solution Take 30 mLs (20 g) by mouth 2 times daily  Qty: 1800 mL, Refills: 0     Comments: Future refills by PCP Dr. FERANNDA BRANTLEY with phone number [phone].  Associated Diagnoses: Alcoholic hepatitis without ascites      mirtazapine (REMERON) 15 MG tablet Take 1 tablet (15 mg) by mouth At Bedtime  Qty: 30 tablet, Refills: 0    Comments: Future refills by PCP Dr. FERNANDA BRANTLEY with phone number [phone].  Associated Diagnoses: Alcohol withdrawal syndrome without complication (H)      pantoprazole (PROTONIX) 40 MG EC tablet Take 1 tablet (40 mg) by mouth every morning (before breakfast)  Qty: 30 tablet, Refills: 0    Comments: Future refills by PCP Dr. FERNANDA BRANTLEY with phone number [phone].  Associated Diagnoses: Alcoholic cirrhosis of liver with ascites (H)      propranolol (INDERAL) 10 MG tablet Take 1 tablet (10 mg) by mouth 2 times daily  Qty: 60 tablet, Refills: 0    Comments: Future refills by PCP Dr. FERNANDA BRANTLEY with phone number [phone].  Associated Diagnoses: Alcoholic cirrhosis of liver with ascites (H)      QUEtiapine (SEROQUEL) 50 MG tablet Take 1 tablet (50 mg) by mouth At Bedtime  Qty: 30 tablet, Refills: 0    Comments: Future refills by PCP Dr. FERNANDA BRANTLEY with phone number [phone].  Associated Diagnoses: Alcohol withdrawal syndrome without complication (H)      traZODone (DESYREL) 100 MG tablet Take 2 tablets (200 mg) by mouth At Bedtime  Qty: 60 tablet, Refills: 0    Comments: Future refills by PCP Dr. FERNANDA BRANTLEY with phone number [phone].  Associated Diagnoses: Alcohol withdrawal syndrome without complication (H)      vitamin B1 (THIAMINE) 100 MG tablet Take 1 tablet (100 mg) by mouth daily  Qty: 30 tablet, Refills: 0    Comments: Future refills by PCP Dr. FERNANDA BRANTLEY with phone number [phone].  Associated Diagnoses: Alcohol withdrawal syndrome with complication (H)         CONTINUE these medications which have NOT CHANGED    Details   albuterol (PROAIR HFA/PROVENTIL HFA/VENTOLIN HFA) 108 (90  BASE) MCG/ACT Inhaler Inhale 2 puffs into the lungs every 6 hours as needed       fluticasone-vilanterol (BREO ELLIPTA) 200-25 MCG/INH oral inhaler Inhale 1 puff into the lungs daily       multivitamin, therapeutic with minerals (THERA-VIT-M) TABS tablet Take 1 tablet by mouth daily  Qty: 30 each, Refills: 0    Associated Diagnoses: Alcoholic intoxication without complication (H)      nicotine (NICODERM CQ) 21 MG/24HR 24 hr patch Place 1 patch onto the skin daily  Qty: 7 patch, Refills: 0    Comments: Future refills by PCP Dr. FERNANDA BRANTLEY with phone number [phone].  Associated Diagnoses: Alcoholic cirrhosis of liver with ascites (H)      spironolactone (ALDACTONE) 25 MG tablet Take 2 tablets (50 mg) by mouth daily  Qty: 30 tablet, Refills: 0    Associated Diagnoses: Alcoholic cirrhosis of liver with ascites (H)      order for DME Equipment being ordered: Walker Wheels () and Walker ()  Treatment Diagnosis: difficulty walking  Qty: 1 each, Refills: 0    Associated Diagnoses: Spinal stenosis, lumbar region, with neurogenic claudication         STOP taking these medications       tamsulosin (FLOMAX) 0.4 MG capsule Comments:   Reason for Stopping:             Allergies   Allergies   Allergen Reactions     Amlodipine Swelling     Lisinopril      Other reaction(s): Angioedema  Mouth and tongue swelling   Mouth and tongue swelling

## 2019-10-30 NOTE — PLAN OF CARE
DATE & TIME: 10/29/2019 9675-8701    Cognitive Concerns/ Orientation : A&O x4   BEHAVIOR & AGGRESSION TOOL COLOR: Green  CIWA SCORE: NA   ABNL VS/O2: VSS on RA  MOBILITY: SBA w/ walker and gate belt   PAIN MANAGMENT: PRN atarax given 1x for anxiety  DIET: Regular w/ 1000mL fluid restriction   BOWEL/BLADDER: continent of B/B.   ABNL LAB/BG: WBC 3, Hgb 10, Plat 113, Alb 2.6  DRAIN/DEVICES: PIV R arm SL  TELEMETRY RHYTHM: sinus bradycardia w/ BBB  SKIN: WDL w/ some scabbing  TESTS/PROCEDURES: pericentesis  D/C DAY/GOALS/PLACE:pending progress; hopefully discharge tomorrow   OTHER IMPORTANT INFO: pt to go to in CD treatment facility- possibly [facility]

## 2019-10-30 NOTE — DISCHARGE INSTRUCTIONS
Referral has been sent to [facility]'s In-patient program.  Merissa will be calling likely later this week or early next week to screen you for admission.  The current wait list is 2&1/2 to 3 weeks.  [facility]issa's phone number is [phone].    If you need any additional help for Substance Use Disorder programs, you can call Bre at [facility] [phone]

## 2019-10-30 NOTE — PROGRESS NOTES
The SW has consulted on this pt.  Care Coordinator has scheduled appointments for PCP and [facility] clinic.

## 2019-10-30 NOTE — PLAN OF CARE
Discharge Planner OT   Patient plan for discharge: home  Current status: pt completed bed mobility independent, amb without AD to/from bathroom and tub room SBA, toilet transfer with grab bar mod I, SBA standing at sink for ADL task, mod I with completing tub transfer with grab bars, independent to doff/don slipper socks  Barriers to return to prior living situation: Current level of A for I/ADLs and functional mobility   Recommendations for discharge: after collaboration with OTR home with A for all IADLs and Home OT for safety eval and PT for strengthening/in home mobility and Home RN (for medication management) per plan established by the Occupational Therapist  Rationale for recommendations: pt has progressed well, states he will have assist from roommate for IADLS and to ensure accuracy for mediation management as needed.        Entered by: Krista Dolan 10/30/2019 11:40 AM        Pt has met OT goals for this setting, will discharge see discharge summary

## 2019-11-02 LAB
BACTERIA SPEC CULT: NO GROWTH
SPECIMEN SOURCE: NORMAL

## 2019-11-13 ENCOUNTER — HOSPITAL ENCOUNTER (EMERGENCY)
Facility: CLINIC | Age: 64
Discharge: HOME | End: 2019-11-13
Attending: EMERGENCY MEDICINE | Admitting: EMERGENCY MEDICINE
Payer: MEDICARE

## 2019-11-13 VITALS
TEMPERATURE: 98.3 F | BODY MASS INDEX: 27.32 KG/M2 | SYSTOLIC BLOOD PRESSURE: 130 MMHG | HEIGHT: 66 IN | DIASTOLIC BLOOD PRESSURE: 73 MMHG | WEIGHT: 170 LBS | OXYGEN SATURATION: 96 % | RESPIRATION RATE: 18 BRPM

## 2019-11-13 DIAGNOSIS — K70.11 ALCOHOLIC HEPATITIS WITH ASCITES (H): ICD-10-CM

## 2019-11-13 DIAGNOSIS — F10.920 ALCOHOLIC INTOXICATION WITHOUT COMPLICATION (H): ICD-10-CM

## 2019-11-13 LAB
ALBUMIN SERPL-MCNC: 3 G/DL (ref 3.4–5)
ALBUMIN UR-MCNC: 30 MG/DL
ALP SERPL-CCNC: 257 U/L (ref 40–150)
ALT SERPL W P-5'-P-CCNC: 24 U/L (ref 0–70)
ANION GAP SERPL CALCULATED.3IONS-SCNC: 11 MMOL/L (ref 3–14)
APPEARANCE UR: CLEAR
AST SERPL W P-5'-P-CCNC: 58 U/L (ref 0–45)
BASOPHILS # BLD AUTO: 0.1 10E9/L (ref 0–0.2)
BASOPHILS NFR BLD AUTO: 1 %
BILIRUB SERPL-MCNC: 1 MG/DL (ref 0.2–1.3)
BILIRUB UR QL STRIP: NEGATIVE
BUN SERPL-MCNC: 20 MG/DL (ref 7–30)
CALCIUM SERPL-MCNC: 8 MG/DL (ref 8.5–10.1)
CHLORIDE SERPL-SCNC: 109 MMOL/L (ref 94–109)
CO2 SERPL-SCNC: 22 MMOL/L (ref 20–32)
COLOR UR AUTO: YELLOW
CREAT SERPL-MCNC: 0.89 MG/DL (ref 0.66–1.25)
DIFFERENTIAL METHOD BLD: ABNORMAL
EOSINOPHIL # BLD AUTO: 0.1 10E9/L (ref 0–0.7)
EOSINOPHIL NFR BLD AUTO: 1.1 %
ERYTHROCYTE [DISTWIDTH] IN BLOOD BY AUTOMATED COUNT: 15.7 % (ref 10–15)
GFR SERPL CREATININE-BSD FRML MDRD: 89 ML/MIN/{1.73_M2}
GLUCOSE SERPL-MCNC: 78 MG/DL (ref 70–99)
GLUCOSE UR STRIP-MCNC: NEGATIVE MG/DL
HCT VFR BLD AUTO: 32.2 % (ref 40–53)
HGB BLD-MCNC: 10.6 G/DL (ref 13.3–17.7)
HGB UR QL STRIP: NEGATIVE
IMM GRANULOCYTES # BLD: 0 10E9/L (ref 0–0.4)
IMM GRANULOCYTES NFR BLD: 0.1 %
KETONES UR STRIP-MCNC: NEGATIVE MG/DL
LEUKOCYTE ESTERASE UR QL STRIP: NEGATIVE
LIPASE SERPL-CCNC: 499 U/L (ref 73–393)
LYMPHOCYTES # BLD AUTO: 1.3 10E9/L (ref 0.8–5.3)
LYMPHOCYTES NFR BLD AUTO: 16.8 %
MCH RBC QN AUTO: 31.9 PG (ref 26.5–33)
MCHC RBC AUTO-ENTMCNC: 32.9 G/DL (ref 31.5–36.5)
MCV RBC AUTO: 97 FL (ref 78–100)
MONOCYTES # BLD AUTO: 0.4 10E9/L (ref 0–1.3)
MONOCYTES NFR BLD AUTO: 5.3 %
MUCOUS THREADS #/AREA URNS LPF: PRESENT /LPF
NEUTROPHILS # BLD AUTO: 6 10E9/L (ref 1.6–8.3)
NEUTROPHILS NFR BLD AUTO: 75.7 %
NITRATE UR QL: NEGATIVE
NRBC # BLD AUTO: 0 10*3/UL
NRBC BLD AUTO-RTO: 0 /100
PH UR STRIP: 6 PH (ref 5–7)
PLATELET # BLD AUTO: 197 10E9/L (ref 150–450)
POTASSIUM SERPL-SCNC: 3.9 MMOL/L (ref 3.4–5.3)
PROT SERPL-MCNC: 7.3 G/DL (ref 6.8–8.8)
RBC # BLD AUTO: 3.32 10E12/L (ref 4.4–5.9)
RBC #/AREA URNS AUTO: <1 /HPF (ref 0–2)
SODIUM SERPL-SCNC: 142 MMOL/L (ref 133–144)
SOURCE: ABNORMAL
SP GR UR STRIP: 1.01 (ref 1–1.03)
SQUAMOUS #/AREA URNS AUTO: <1 /HPF (ref 0–1)
UROBILINOGEN UR STRIP-MCNC: NORMAL MG/DL (ref 0–2)
WBC # BLD AUTO: 7.9 10E9/L (ref 4–11)
WBC #/AREA URNS AUTO: <1 /HPF (ref 0–5)

## 2019-11-13 PROCEDURE — 99283 EMERGENCY DEPT VISIT LOW MDM: CPT

## 2019-11-13 PROCEDURE — 81001 URINALYSIS AUTO W/SCOPE: CPT | Performed by: EMERGENCY MEDICINE

## 2019-11-13 PROCEDURE — 85025 COMPLETE CBC W/AUTO DIFF WBC: CPT | Performed by: EMERGENCY MEDICINE

## 2019-11-13 PROCEDURE — 80053 COMPREHEN METABOLIC PANEL: CPT | Performed by: EMERGENCY MEDICINE

## 2019-11-13 PROCEDURE — 25000132 ZZH RX MED GY IP 250 OP 250 PS 637: Mod: GY | Performed by: EMERGENCY MEDICINE

## 2019-11-13 PROCEDURE — 83690 ASSAY OF LIPASE: CPT | Performed by: EMERGENCY MEDICINE

## 2019-11-13 RX ORDER — TRAZODONE HYDROCHLORIDE 100 MG/1
100 TABLET ORAL ONCE
Status: COMPLETED | OUTPATIENT
Start: 2019-11-13 | End: 2019-11-13

## 2019-11-13 RX ORDER — HYDROCODONE BITARTRATE AND ACETAMINOPHEN 5; 325 MG/1; MG/1
1 TABLET ORAL ONCE
Status: DISCONTINUED | OUTPATIENT
Start: 2019-11-13 | End: 2019-11-13

## 2019-11-13 RX ADMIN — TRAZODONE HYDROCHLORIDE 100 MG: 100 TABLET ORAL at 19:20

## 2019-11-13 ASSESSMENT — ENCOUNTER SYMPTOMS
DYSURIA: 1
ROS GI COMMENTS: DRY HEAVES
VOMITING: 1
FEVER: 0
DIARRHEA: 0

## 2019-11-13 ASSESSMENT — MIFFLIN-ST. JEOR: SCORE: 1498.86

## 2019-11-13 NOTE — ED AVS SNAPSHOT
Emergency Department  [address] [facility] [address]  Phone:  [phone]  Fax:  [phone]                                    Jim Richard   MRN: [id number]    Department:   Emergency Department   Date of Visit:  11/13/2019           After Visit Summary Signature Page    I have received my discharge instructions, and my questions have been answered. I have discussed any challenges I see with this plan with the nurse or doctor.    ..........................................................................................................................................  Patient/Patient Representative Signature      ..........................................................................................................................................  Patient Representative Print Name and Relationship to Patient    ..................................................               ................................................  Date                                   Time    ..........................................................................................................................................  Reviewed by Signature/Title    ...................................................              ..............................................  Date                                               Time          22EPIC Rev 08/18

## 2019-11-14 NOTE — ED NOTES
Bed: ED17  Expected date: 11/13/19  Expected time: 5:52 PM  Means of arrival: Ambulance  Comments:  437 64m etoh abd pain ETA 1800

## 2019-11-14 NOTE — DISCHARGE INSTRUCTIONS

## 2019-11-14 NOTE — ED PROVIDER NOTES
"  History     Chief Complaint:  Alcohol Intoxication and Abdominal Pain      HPI   Jim Richard is a 64 year old male who presents to the emergency department for evaluation of alcohol intoxication and abdominal pain. The patient is unaware of how much alcohol he consumed, but he believes that he needs his \"belly drained.\" The patient states that he had a therapeutic paracentesis 2-3 weeks ago. He also reports that he has cirrhosis. The patient reports difficulty sleeping, vomiting, difficulty urinating, dry heaves.  He also reports passive suicidal thoughts to nursing, but denies this to me. The patient does not have a plan for suicide. The patient denies fevers, or diarrhea. The patient indicated that he wants to go to a rehab center.    Allergies:  Amlodipine  Lisinopril    Medications:    Albuterol  Cymbalta  Breo ellipta  Folvite  Lasix  Chronulac  Remeron  Nicoderm  Protonix  Inderal  Seroquel  Aldactone  Desyrel  Thiamine    Past Medical History:    PAD  Anxiety  Depression  Spinal stenosis  Alcohol withdrawal  Fall  Chemical dependency  Suicidal ideation  Alcohol abuse  Rib fractures  Alcoholic hepatitis without ascites  Esophageal varices  Hypertension  COPD  JANIS on CPAP  Back pain  Melena  Abnormal LFTs  Anemia  Backache  Chronic pain  Hyperlipidemia  Umbilical hernia repair  Tremor  Alcoholism  Anxiety  CAD  Esophageal varices with bleeding  Heart attack  Hepatomegaly  Peripheral vascular disease  Subdural hematoma    Past Surgical History:    Appendectomy  Bypass graft femoropopliteal  Colonoscopy x2  Endarterectomy femoral  EGD, combined x4  Hernia repair  Laminectomy, fusion lumbar three + level, combined  Tonsillectomy and Adenoidectomy    Family History:    Mental illness  CAD early onset  Substance abuse  Lung cancer    Social History:  Current every day smoker  Positive for alcohol use.   Negative for drug use.  Marital Status:      Review of Systems   Constitutional: Negative for " "fever.        Difficulty sleeping   Gastrointestinal: Positive for vomiting. Negative for diarrhea.        Dry heaves   Genitourinary: Positive for dysuria.   Psychiatric/Behavioral: Positive for suicidal ideas.     10 point review of systems performed and is negative except as above and in HPI.    Physical Exam     Patient Vitals for the past 24 hrs:   BP Temp Temp src Heart Rate Resp SpO2 Height Weight   11/13/19 1813 (!) 150/86 98.3  F (36.8  C) Oral 77 18 96 % 1.676 m (5' 6\") 77.1 kg (170 lb)     Physical Exam    General: Resting on the gurney, appears uncomfortable  Head:  The scalp, face, and head appear normal  Mouth/Throat: Mucus membranes are moist  CV:  Regular rate    Normal S1 and S2  No pathological murmur   Resp:  Breath sounds clear and equal bilaterally    Non-labored, no retractions or accessory muscle use    No coarseness    No wheezing   GI:  Abdomen is distended with mild diffuse tenderness. No rebound.     No tenderness to palpation  MS:  Normal motor assessment of all extremities.    Good capillary refill noted.      Skin:  No rash or lesions noted.  Neuro:   Speech is normal and fluent. No apparent deficit.  Psych: Awake. Alert.  Normal affect.      Appropriate interactions. Passive suicidal thoughts, no thoughts of harming others    Emergency Department Course     Laboratory:  Laboratory findings were communicated with the patient who voiced understanding of the findings.    CBC: HGB 10.6 (L) o/w WNL. (WBC 7.9, )   CMP: Calcium 8.0 (L), Albumin 3.0 (L), ALKPHOS 257 (H), AST 58 (H) o/w WNL (Creatinine 0.89)    1858 Lipase 499 (H)    UA with micro: Protein albumin 30 (A), Mucous Present (A) o/w negative    Interventions:  1920 Desyrel 100 mg PO    Emergency Department Course:    1814 Nursing notes and vitals reviewed.    1815 I performed an exam of the patient as documented above.     1849 The patient provided a urine sample here in the emergency department. This was sent for laboratory " testing, findings above.    1858 IV was inserted and blood was drawn for laboratory testing, results above.    Findings and plan explained to the Patient. Patient discharged home with instructions regarding supportive care, medications, and reasons to return. The importance of close follow-up was reviewed.     Impression & Plan     Medical Decision Making:  Jim Richard is a 64 year old male who presents for evaluation of alcohol intoxication requesting detox.  They are intoxicated here in ED and initially wanted detox.  He then decided he wanted to go home.   Patient has no history of Delirium tremens or alcohol withdrawal seizures. There are no signs of trauma related to alcohol use and no further workup is needed including head CT.  No signs of SBP.  He became quite eager for discharge and was discharged per his request.  Not currently suicidal and feels safe going home.  No other complaints.      Discharge Diagnosis:    ICD-10-CM    1. Alcoholic intoxication without complication (H) F10.920    2. Alcoholic hepatitis with ascites K70.11      Disposition:  The patient is discharged to home.     Scribe Disclosure:  I, Cecil Levine, am serving as a scribe at 8:22 PM on 11/13/2019 to document services personally performed by Nae Sheridan MD based on my observations and the provider's statements to me.        Nae Sheridan MD  11/15/19 3112

## 2019-11-14 NOTE — ED TRIAGE NOTES
"Pt called nurse line today because he wanted to get a bed to sober up. \"I think I'm dying. I'm getting weaker and weaker\" Nurse line called 911. Pt c/o abd pain.   "

## 2019-11-19 ENCOUNTER — HOSPITAL ENCOUNTER (EMERGENCY)
Facility: CLINIC | Age: 64
Discharge: HOME | End: 2019-11-19
Attending: EMERGENCY MEDICINE | Admitting: EMERGENCY MEDICINE
Payer: MEDICARE

## 2019-11-19 ENCOUNTER — APPOINTMENT (OUTPATIENT)
Dept: ULTRASOUND IMAGING | Facility: CLINIC | Age: 64
End: 2019-11-19
Attending: EMERGENCY MEDICINE
Payer: MEDICARE

## 2019-11-19 VITALS
BODY MASS INDEX: 26.68 KG/M2 | WEIGHT: 170 LBS | SYSTOLIC BLOOD PRESSURE: 153 MMHG | HEART RATE: 80 BPM | HEIGHT: 67 IN | OXYGEN SATURATION: 96 % | DIASTOLIC BLOOD PRESSURE: 76 MMHG | TEMPERATURE: 99 F | RESPIRATION RATE: 20 BRPM

## 2019-11-19 DIAGNOSIS — K70.31 ALCOHOLIC CIRRHOSIS OF LIVER WITH ASCITES (H): ICD-10-CM

## 2019-11-19 DIAGNOSIS — R11.0 NAUSEA: ICD-10-CM

## 2019-11-19 LAB
ALBUMIN FLD-MCNC: 0.7 G/DL
ALBUMIN SERPL-MCNC: 3 G/DL (ref 3.4–5)
ALP SERPL-CCNC: 307 U/L (ref 40–150)
ALT SERPL W P-5'-P-CCNC: 30 U/L (ref 0–70)
ANION GAP SERPL CALCULATED.3IONS-SCNC: 8 MMOL/L (ref 3–14)
APPEARANCE FLD: NORMAL
APTT PPP: 32 SEC (ref 22–37)
AST SERPL W P-5'-P-CCNC: 89 U/L (ref 0–45)
BASOPHILS # BLD AUTO: 0 10E9/L (ref 0–0.2)
BASOPHILS NFR BLD AUTO: 0.4 %
BILIRUB DIRECT SERPL-MCNC: 0.7 MG/DL (ref 0–0.2)
BILIRUB SERPL-MCNC: 1.7 MG/DL (ref 0.2–1.3)
BUN SERPL-MCNC: 17 MG/DL (ref 7–30)
CALCIUM SERPL-MCNC: 8.1 MG/DL (ref 8.5–10.1)
CHLORIDE SERPL-SCNC: 107 MMOL/L (ref 94–109)
CO2 SERPL-SCNC: 23 MMOL/L (ref 20–32)
COLOR FLD: YELLOW
CREAT SERPL-MCNC: 0.87 MG/DL (ref 0.66–1.25)
DIFFERENTIAL METHOD BLD: ABNORMAL
EOSINOPHIL # BLD AUTO: 0 10E9/L (ref 0–0.7)
EOSINOPHIL NFR BLD AUTO: 0.2 %
ERYTHROCYTE [DISTWIDTH] IN BLOOD BY AUTOMATED COUNT: 16.2 % (ref 10–15)
ETHANOL SERPL-MCNC: 0.11 G/DL
GFR SERPL CREATININE-BSD FRML MDRD: >90 ML/MIN/{1.73_M2}
GLUCOSE SERPL-MCNC: 105 MG/DL (ref 70–99)
HCT VFR BLD AUTO: 29.2 % (ref 40–53)
HGB BLD-MCNC: 9.7 G/DL (ref 13.3–17.7)
IMM GRANULOCYTES # BLD: 0 10E9/L (ref 0–0.4)
IMM GRANULOCYTES NFR BLD: 0.2 %
INR PPP: 1.21 (ref 0.86–1.14)
LYMPHOCYTES # BLD AUTO: 0.6 10E9/L (ref 0.8–5.3)
LYMPHOCYTES NFR BLD AUTO: 11.1 %
LYMPHOCYTES NFR FLD MANUAL: 42 %
MCH RBC QN AUTO: 31.7 PG (ref 26.5–33)
MCHC RBC AUTO-ENTMCNC: 33.2 G/DL (ref 31.5–36.5)
MCV RBC AUTO: 95 FL (ref 78–100)
MONOCYTES # BLD AUTO: 0.6 10E9/L (ref 0–1.3)
MONOCYTES NFR BLD AUTO: 9.8 %
MONOS+MACROS NFR FLD MANUAL: 13 %
NEUTROPHILS # BLD AUTO: 4.4 10E9/L (ref 1.6–8.3)
NEUTROPHILS NFR BLD AUTO: 78.3 %
NEUTS BAND NFR FLD MANUAL: 2 %
NRBC # BLD AUTO: 0 10*3/UL
NRBC BLD AUTO-RTO: 0 /100
OTHER CELLS FLD MANUAL: 43 %
PLATELET # BLD AUTO: 109 10E9/L (ref 150–450)
POTASSIUM SERPL-SCNC: 3.8 MMOL/L (ref 3.4–5.3)
PROT FLD-MCNC: 1.7 G/DL
PROT SERPL-MCNC: 7.2 G/DL (ref 6.8–8.8)
RBC # BLD AUTO: 3.06 10E12/L (ref 4.4–5.9)
SODIUM SERPL-SCNC: 138 MMOL/L (ref 133–144)
SPECIMEN SOURCE FLD: NORMAL
WBC # BLD AUTO: 5.6 10E9/L (ref 4–11)
WBC # FLD AUTO: 293 /UL

## 2019-11-19 PROCEDURE — 84157 ASSAY OF PROTEIN OTHER: CPT | Performed by: EMERGENCY MEDICINE

## 2019-11-19 PROCEDURE — 96374 THER/PROPH/DIAG INJ IV PUSH: CPT | Mod: 59

## 2019-11-19 PROCEDURE — 25000125 ZZHC RX 250: Performed by: RADIOLOGY

## 2019-11-19 PROCEDURE — 85025 COMPLETE CBC W/AUTO DIFF WBC: CPT | Performed by: EMERGENCY MEDICINE

## 2019-11-19 PROCEDURE — 85730 THROMBOPLASTIN TIME PARTIAL: CPT | Performed by: EMERGENCY MEDICINE

## 2019-11-19 PROCEDURE — 82042 OTHER SOURCE ALBUMIN QUAN EA: CPT | Performed by: EMERGENCY MEDICINE

## 2019-11-19 PROCEDURE — 80320 DRUG SCREEN QUANTALCOHOLS: CPT | Performed by: EMERGENCY MEDICINE

## 2019-11-19 PROCEDURE — 89051 BODY FLUID CELL COUNT: CPT | Performed by: EMERGENCY MEDICINE

## 2019-11-19 PROCEDURE — 85610 PROTHROMBIN TIME: CPT | Performed by: EMERGENCY MEDICINE

## 2019-11-19 PROCEDURE — 82248 BILIRUBIN DIRECT: CPT | Performed by: EMERGENCY MEDICINE

## 2019-11-19 PROCEDURE — 96376 TX/PRO/DX INJ SAME DRUG ADON: CPT | Mod: 59

## 2019-11-19 PROCEDURE — 25000128 H RX IP 250 OP 636: Performed by: EMERGENCY MEDICINE

## 2019-11-19 PROCEDURE — 27210190 US PARACENTESIS

## 2019-11-19 PROCEDURE — 99285 EMERGENCY DEPT VISIT HI MDM: CPT | Mod: 25

## 2019-11-19 PROCEDURE — 80053 COMPREHEN METABOLIC PANEL: CPT | Performed by: EMERGENCY MEDICINE

## 2019-11-19 PROCEDURE — 96375 TX/PRO/DX INJ NEW DRUG ADDON: CPT | Mod: 59

## 2019-11-19 RX ORDER — LORAZEPAM 2 MG/ML
0.5 INJECTION INTRAMUSCULAR EVERY 30 MIN PRN
Status: DISCONTINUED | OUTPATIENT
Start: 2019-11-19 | End: 2019-11-19 | Stop reason: HOSPADM

## 2019-11-19 RX ORDER — LIDOCAINE HYDROCHLORIDE 10 MG/ML
10 INJECTION, SOLUTION EPIDURAL; INFILTRATION; INTRACAUDAL; PERINEURAL ONCE
Status: COMPLETED | OUTPATIENT
Start: 2019-11-19 | End: 2019-11-19

## 2019-11-19 RX ORDER — ONDANSETRON 2 MG/ML
4 INJECTION INTRAMUSCULAR; INTRAVENOUS ONCE
Status: COMPLETED | OUTPATIENT
Start: 2019-11-19 | End: 2019-11-19

## 2019-11-19 RX ADMIN — LIDOCAINE HYDROCHLORIDE 10 ML: 10 INJECTION, SOLUTION EPIDURAL; INFILTRATION; INTRACAUDAL; PERINEURAL at 16:47

## 2019-11-19 RX ADMIN — LORAZEPAM 0.5 MG: 2 INJECTION, SOLUTION INTRAMUSCULAR; INTRAVENOUS at 17:27

## 2019-11-19 RX ADMIN — LORAZEPAM 0.5 MG: 2 INJECTION, SOLUTION INTRAMUSCULAR; INTRAVENOUS at 19:24

## 2019-11-19 RX ADMIN — ONDANSETRON 4 MG: 2 INJECTION INTRAMUSCULAR; INTRAVENOUS at 16:03

## 2019-11-19 ASSESSMENT — ENCOUNTER SYMPTOMS
VOMITING: 0
FEVER: 0
WEAKNESS: 1
ABDOMINAL DISTENTION: 1
ABDOMINAL PAIN: 0

## 2019-11-19 ASSESSMENT — MIFFLIN-ST. JEOR: SCORE: 1514.74

## 2019-11-19 NOTE — ED TRIAGE NOTES
Pt states he has liver failure, increased weakness, abd bloating, was tapped last week, nauseated and decreased urine output

## 2019-11-19 NOTE — ED PROVIDER NOTES
History     Chief Complaint:  Abdominal Swelling and Nausea    HPI   Jim Richard is a 64 year old male with a complex medical history including alcoholism, alcoholic liver disease, HTN, and HLD who presents to the emergency department for evaluation of abdominal swelling and nausea. The patient reports increased weakness, abdominal swelling over the last few days, and nausea, prompting his presentation to the ED. He indicates he has had abdominal distension since mid October, and he underwent two paracentesis procedures during an admission at [facility] from 10/22-10/30. He was admitted following an intentional drug overdose on 10/22. The patient notes he is on a fluid restriction of 1000 mL of water a day. He indicates he drank a pint of vodka yesterday. He notes he has leg swelling along with the abdominal distension. He denies fever, vomiting, and abdominal pain.     Allergies:  Amlodipine  Lisinopril      Medications:    Cymbalta  Lasix  Chronulac  Remeron  Protonix  Inderal  Seroquel  Desyrel  Albuterol inhaler  Breo Ellipta  Nicoderm CQ  Aldactone     Past Medical History:    Alcoholism  Anxiety  CAD  Depression  Esophageal varices with bleeding   Heart attack  Hepatomegaly  HLD  HTN  JANIS  PVD  SDH  Spinal senosis  Alcohol withdrawal  COPD  Chronic pain  Umbilicale hernia    Past Surgical History:    Appendectomy  Bypass graft femoropopliteal   Colonoscopy   Endarterectomy femoral  EGD  Hernia repair   Laminectomy, fusion lumbar three+ level, combined  Tonsillectomy and adenoidectomy   Paracentesis     Family History:    Mental illness  CAD  Substance abuse   Lung cancer     Social History:  Presents alone.  Current every day smoker, 1 ppd.  Positive for alcohol use.    Negative for drug use.   Marital Status:   [4]     Review of Systems   Constitutional: Negative for fever.   Cardiovascular: Positive for leg swelling.   Gastrointestinal: Positive for abdominal distention. Negative for  "abdominal pain and vomiting.   Neurological: Positive for weakness.   All other systems reviewed and are negative.      Physical Exam     Patient Vitals for the past 24 hrs:   BP Temp Temp src Pulse Resp SpO2 Height Weight   11/19/19 1715 (!) 153/76 -- -- 80 -- 96 % -- --   11/19/19 1700 (!) 153/83 -- -- 86 -- 92 % -- --   11/19/19 1246 (!) 165/83 99  F (37.2  C) Oral 90 20 99 % 1.702 m (5' 7\") 77.1 kg (170 lb)      Physical Exam    General: Alert and Interactive. Slightly tremulous.   Head: No signs of trauma.   Mouth/Throat: Oropharynx is clear and moist.   Eyes: Conjunctivae are normal. Pupils are equal, round, and reactive to light.   Neck: Normal range of motion. No nuchal rigidity.   CV: Normal rate and regular rhythm.    Resp: Effort normal and breath sounds normal. No respiratory distress.   GI: Soft. There is no tenderness or guarding. Abdominal distension.   MSK: Normal range of motion. no edema.   Neuro: The patient is alert and oriented to person, place, and time.  PERRLA, EOMI, strength in upper/lower extremities normal and symmetrical.   Sensation normal. Speech normal.  GCS eye subscore is 4. GCS verbal subscore is 5. GCS motor subscore is 6.   Skin: Skin is warm and dry. No rash noted.   Psych: normal mood and affect. behavior is normal.      Emergency Department Course     Laboratory:  CBC: WBC: 5.6, HGB: 9.7 (L), PLT: 109 (L)     CMP: Glucose 105 (H), Calcium 8.1 (L), Bilirubin Total  1.7 (H), Albumin 3.0 (L), Alkaline Phosphatase 307 (H), AST 89 (H), o/w WNL (Creatinine: 0.87)    PTT: 32    Bilirubin direct: 0.7 (H)    INR: 1.21 (H)    1532 Alcohol ethyl: 0.11 (H)    Protein fluid: 1.7    Albumin fluid: 0.7    Cell count with differential fluid: % Neutrophils 2, % Lymphocytes 42, % Mono/Macro 13, % Other cells 43, Color yellow, Appearance slightly cloudy,      Interventions:  1603 Zofran 4 mg IV  1647 Xylocaine 10 mL Subcutaneous   1727 Ativan 0.5 mg IV  1924 Ativan 0.5 mg IV    Emergency " Department Course:  Nursing notes and vitals reviewed. 1550 I performed an exam of the patient as documented above.     IV inserted. Medicine administered as documented above. Blood drawn. This was sent to the lab for further testing, results above.    1556 I spoke with Dr. Jimenes, radiologist, regarding the patient. He agreed to do an US paracentesis in the ED.     1645 US paracentesis performed via left lower quadrant.    1722 I rechecked the patient and discussed the results of his workup thus far.     Findings and plan explained to the Patient. Patient discharged home with instructions regarding supportive care, medications, and reasons to return. The importance of close follow-up was reviewed.     I personally reviewed the laboratory results with the Patient and answered all related questions prior to discharge.    Impression & Plan      Medical Decision Making:  Jim Richard is a 64 year old male who presents to the ED complaining of abdominal distension. The patient has a history of ascites. No fever and no signs of spontaneous bacterial peritonitis. He is slightly intoxicated but is cooperative. I was able to arrange for paracentesis, see note from radiology. Peritoneal fluid was negative for signs of infection. Patient's laboratory studies are remarkable for liver inflammation but not out of his typical. Patient was offered admission but is declining and would like to go home. He will follow up with his primary care doctor to set up repeated paracentesis if needed.     Diagnosis:    ICD-10-CM    1. Alcoholic cirrhosis of liver with ascites (H) K70.31 Cell count with differential fluid     Albumin fluid     Protein fluid     CANCELED: Albumin level   2. Nausea R11.0      Disposition:  discharged to home    Scribe Disclosure:  AMBROSIO, Kelle Miller, am serving as a scribe on 11/19/2019 at 4:09 PM to personally document services performed by Dez Coy MD based on my observations and the provider's  statements to me.      Kelle Miller  11/19/2019    EMERGENCY DEPARTMENT       Dez Coy MD  11/20/19 0102

## 2019-11-19 NOTE — ED AVS SNAPSHOT
Emergency Department  [address] [facility] [address]  Phone:  [phone]  Fax:  [phone]                                    Jim Richard   MRN: [id number]    Department:   Emergency Department   Date of Visit:  11/19/2019           After Visit Summary Signature Page    I have received my discharge instructions, and my questions have been answered. I have discussed any challenges I see with this plan with the nurse or doctor.    ..........................................................................................................................................  Patient/Patient Representative Signature      ..........................................................................................................................................  Patient Representative Print Name and Relationship to Patient    ..................................................               ................................................  Date                                   Time    ..........................................................................................................................................  Reviewed by Signature/Title    ...................................................              ..............................................  Date                                               Time          22EPIC Rev 08/18

## 2019-11-20 NOTE — IR NOTE
ULTRASOUND GUIDED PARACENTESIS   11/19/2019 5:17 PM     HISTORY:  HIGH VOLUME paracentesis with or without diagnostic fluid analysis with labs to be drawn if ordered. Total paracentesis volume as much as possible.    PROCEDURE:   Informed consent was obtained from the patient prior to the procedure with discussion including the possible risks of bleeding, infection and organ injury . Using 5 mL of 1% lidocaine for local anesthesia, sterile technique, and sonographic guidance with permanent image documentation, I placed an 8F paracentesis catheter into the peritoneal fluid collection. This was used to aspirate 3500 mL of yellow, serous fluid in vacuum bottles, and some of this was sent for any laboratory studies that had been ordered. There were no immediate complications.  Intravenous albumen replacement was performed according to protocol.    IMPRESSION:  Ultrasound guided paracentesis.

## 2019-12-10 ENCOUNTER — HOSPITAL ENCOUNTER (EMERGENCY)
Facility: CLINIC | Age: 64
Discharge: HOME | End: 2019-12-10
Attending: EMERGENCY MEDICINE | Admitting: EMERGENCY MEDICINE
Payer: MEDICARE

## 2019-12-10 VITALS
HEART RATE: 80 BPM | OXYGEN SATURATION: 97 % | SYSTOLIC BLOOD PRESSURE: 139 MMHG | DIASTOLIC BLOOD PRESSURE: 86 MMHG | RESPIRATION RATE: 18 BRPM | TEMPERATURE: 98.2 F

## 2019-12-10 DIAGNOSIS — F10.920 ALCOHOL INTOXICATION, UNCOMPLICATED (H): ICD-10-CM

## 2019-12-10 DIAGNOSIS — R45.851 SUICIDAL THOUGHTS: ICD-10-CM

## 2019-12-10 PROCEDURE — 99285 EMERGENCY DEPT VISIT HI MDM: CPT

## 2019-12-10 ASSESSMENT — ENCOUNTER SYMPTOMS: DIFFICULTY URINATING: 1

## 2019-12-10 NOTE — ED AVS SNAPSHOT
Emergency Department  [address] [facility] [address]  Phone:  [phone]  Fax:  [phone]                                    Jim Richard   MRN: [id number]    Department:   Emergency Department   Date of Visit:  12/10/2019           After Visit Summary Signature Page    I have received my discharge instructions, and my questions have been answered. I have discussed any challenges I see with this plan with the nurse or doctor.    ..........................................................................................................................................  Patient/Patient Representative Signature      ..........................................................................................................................................  Patient Representative Print Name and Relationship to Patient    ..................................................               ................................................  Date                                   Time    ..........................................................................................................................................  Reviewed by Signature/Title    ...................................................              ..............................................  Date                                               Time          22EPIC Rev 08/18

## 2019-12-11 NOTE — ED PROVIDER NOTES
"  History     Chief Complaint:  Alcohol Intoxication      HPI   Jim Richard is a 64 year old male who presents via EMS with suicidal ideation and alcohol intoxication. Per EMS, the patient called the police department because he was having a breakdown. They say that the patient told the police to shoot him because he wanted to die. Here in the ED the patient says that he has not been taking his medications for the past 3 days because he has been drinking. He says that the suicidal intent only lasted one hour and they these \"episodes\" don't happen often. He says that he no longer feels suicidal.       Allergies:  Amlodipine  Lisinopril      Medications:    Albuterol  Cymbalta  Breo Ellipta  Folvite  Lasix  Chronulac  Remeron  Nicoderm  Protonix  Inderal  Seroquel  Aldactone  Desyrel         Past Medical History:    Alcoholism  Anxiety  CAD  Depression  Esophageal varices with bleeding   Heart attack  Hepatomegaly  HLD  HTN  JANIS  PVD  SDH  Spinal stenosis  Alcohol withdrawal  COPD  Chronic pain  Umbilicale hernia    Past Surgical History:    Appendectomy  Bypass graft femoropopliteal   Colonoscopy   Endarterectomy femoral  EGD  Hernia repair   Laminectomy, fusion lumbar three+ level, combined  Tonsillectomy and adenoidectomy   Paracentesis     Family History:    Mental illness  CAD  Substance abuse   Lung cancer     Social History:  Smoking Status: Current Smoker  Smokeless Tobacco: Never Used  Alcohol Use: Positive  Drug Use: Negative  PCP: Talon Elias   Marital Status:        Review of Systems   Genitourinary: Positive for difficulty urinating.   Psychiatric/Behavioral: Positive for suicidal ideas (None currently).   All other systems reviewed and are negative.        Physical Exam     Patient Vitals for the past 24 hrs:   BP Temp Pulse Resp SpO2   12/10/19 1849 139/86 98.2  F (36.8  C) 80 18 97 %        Physical Exam  Nursing note and vitals reviewed.  Constitutional:  Oriented to person, " place, and time. Cooperative. Smells of alcohol.  HENT:   Nose:    Nose normal.   Mouth/Throat:   Mucous membranes are normal.   Eyes:    Conjunctivae normal and EOM are normal.      Pupils are equal, round, and reactive to light.   Neck:    Trachea normal.   Cardiovascular:  Normal rate, regular rhythm, normal heart sounds and normal pulses. No murmur heard.  Pulmonary/Chest:  Effort normal and breath sounds normal.   Abdominal:   Bowel sounds are normal. Distended non tender abdomen.     There is no tenderness.      There is no rebound and no CVA tenderness.   Musculoskeletal:  Extremities atraumatic x 4. 1+ bilateral lower extremity edema.  Lymphadenopathy:  No cervical adenopathy.   Neurological:   Alert and oriented to person, place, and time. Normal strength.      No cranial nerve deficit or sensory deficit. GCS eye subscore is 4. GCS verbal subscore is 5. GCS motor subscore is 6.   Skin:    Skin is intact. No rash noted.   Psychiatric:   Normal mood and affect. Denies any ongoing suicidal thoughts.    Emergency Department Course     Emergency Department Course:    1818 Nursing notes and vitals reviewed. I performed an exam of the patient as documented above.      The patient is discharged to home.       Impression & Plan      Medical Decision Making:  Jim Richard is a 64 year old male who presents to the emergency department today for evaluation of suicidal thoughts and alcohol intoxication.  He indicates that he is no longer having no suicidal thoughts and they were somewhat fleeting in nature.  While he clearly has alcohol on board, he does not appear overly intoxicated, and he appears clinically sober and able to mentate and ambulate without difficulty.  He feels comfortable going home and feels safe going home, and I think that is reasonable.  I do not feel the need to do any testing today or have him see our DEC .  He also indicates that he will follow-up with his physician and return here  with any concerns or worsening symptoms or any recurrent suicidal thoughts.        Diagnosis:    ICD-10-CM    1. Alcohol intoxication, uncomplicated (H) F10.920    2. Suicidal thoughts R45.851      Disposition:   Findings and plan explained to the Patient. Patient discharged home with instructions regarding supportive care, medications, and reasons to return. The importance of close follow-up was reviewed.     Discharge Medications:  New Prescriptions    No medications on file       Scribe Disclosure:  I, Felipe Julio, am serving as a scribe at 6:29 PM on 12/10/2019 to document services personally performed by Edwin Colvin MD based on my observations and the provider's statements to me.       EMERGENCY DEPARTMENT       Edwin Colvin MD  12/10/19 5729

## 2019-12-11 NOTE — ED TRIAGE NOTES
"Patient states \"I had a breakdown\". Patient called 911 and told the police \"just shoot me\". On arrival, denied Suicidal ideation, states \"it has passed.\"  "

## 2019-12-24 ENCOUNTER — HOSPITAL ENCOUNTER (EMERGENCY)
Facility: CLINIC | Age: 64
Discharge: HOME | End: 2019-12-24
Attending: EMERGENCY MEDICINE | Admitting: EMERGENCY MEDICINE
Payer: MEDICARE

## 2019-12-24 VITALS
SYSTOLIC BLOOD PRESSURE: 144 MMHG | HEIGHT: 67 IN | WEIGHT: 195 LBS | TEMPERATURE: 98.5 F | HEART RATE: 88 BPM | OXYGEN SATURATION: 93 % | BODY MASS INDEX: 30.61 KG/M2 | RESPIRATION RATE: 16 BRPM | DIASTOLIC BLOOD PRESSURE: 87 MMHG

## 2019-12-24 DIAGNOSIS — R10.9 ABDOMINAL PAIN, UNSPECIFIED ABDOMINAL LOCATION: ICD-10-CM

## 2019-12-24 DIAGNOSIS — R18.8 OTHER ASCITES: ICD-10-CM

## 2019-12-24 LAB
BASOPHILS # BLD AUTO: 0.1 10E9/L (ref 0–0.2)
BASOPHILS NFR BLD AUTO: 1.7 %
DIFFERENTIAL METHOD BLD: ABNORMAL
EOSINOPHIL # BLD AUTO: 0.1 10E9/L (ref 0–0.7)
EOSINOPHIL NFR BLD AUTO: 1.9 %
ERYTHROCYTE [DISTWIDTH] IN BLOOD BY AUTOMATED COUNT: 16.3 % (ref 10–15)
HCT VFR BLD AUTO: 30.9 % (ref 40–53)
HGB BLD-MCNC: 10.2 G/DL (ref 13.3–17.7)
IMM GRANULOCYTES # BLD: 0 10E9/L (ref 0–0.4)
IMM GRANULOCYTES NFR BLD: 0.2 %
INR PPP: 1.23 (ref 0.86–1.14)
LYMPHOCYTES # BLD AUTO: 0.9 10E9/L (ref 0.8–5.3)
LYMPHOCYTES NFR BLD AUTO: 19.7 %
MCH RBC QN AUTO: 31.9 PG (ref 26.5–33)
MCHC RBC AUTO-ENTMCNC: 33 G/DL (ref 31.5–36.5)
MCV RBC AUTO: 97 FL (ref 78–100)
MONOCYTES # BLD AUTO: 0.6 10E9/L (ref 0–1.3)
MONOCYTES NFR BLD AUTO: 13 %
NEUTROPHILS # BLD AUTO: 2.9 10E9/L (ref 1.6–8.3)
NEUTROPHILS NFR BLD AUTO: 63.5 %
NRBC # BLD AUTO: 0 10*3/UL
NRBC BLD AUTO-RTO: 0 /100
PLATELET # BLD AUTO: 146 10E9/L (ref 150–450)
RBC # BLD AUTO: 3.2 10E12/L (ref 4.4–5.9)
WBC # BLD AUTO: 4.6 10E9/L (ref 4–11)

## 2019-12-24 PROCEDURE — 85610 PROTHROMBIN TIME: CPT | Performed by: EMERGENCY MEDICINE

## 2019-12-24 PROCEDURE — 85025 COMPLETE CBC W/AUTO DIFF WBC: CPT | Performed by: EMERGENCY MEDICINE

## 2019-12-24 PROCEDURE — 99283 EMERGENCY DEPT VISIT LOW MDM: CPT

## 2019-12-24 ASSESSMENT — ENCOUNTER SYMPTOMS
HEADACHES: 1
FEVER: 0
NAUSEA: 0
WEAKNESS: 1
DIARRHEA: 0
ABDOMINAL DISTENTION: 1
SHORTNESS OF BREATH: 0
COUGH: 1
VOMITING: 0
ABDOMINAL PAIN: 1

## 2019-12-24 ASSESSMENT — MIFFLIN-ST. JEOR: SCORE: 1628.14

## 2019-12-24 NOTE — ED AVS SNAPSHOT
Emergency Department  [address] [facility] [address]  Phone:  [phone]  Fax:  [phone]                                    Jim Richard   MRN: [id number]    Department:   Emergency Department   Date of Visit:  12/24/2019           After Visit Summary Signature Page    I have received my discharge instructions, and my questions have been answered. I have discussed any challenges I see with this plan with the nurse or doctor.    ..........................................................................................................................................  Patient/Patient Representative Signature      ..........................................................................................................................................  Patient Representative Print Name and Relationship to Patient    ..................................................               ................................................  Date                                   Time    ..........................................................................................................................................  Reviewed by Signature/Title    ...................................................              ..............................................  Date                                               Time          22EPIC Rev 08/18

## 2019-12-25 NOTE — ED NOTES
Bed: ED14  Expected date: 12/24/19  Expected time: 9:55 PM  Means of arrival: Ambulance  Comments:  HEMS 414 64M abd. Distension, hx acities

## 2019-12-25 NOTE — ED PROVIDER NOTES
History     Chief Complaint:  Abdominal Pain      HPI   Jim Richard is a 64 year old male with a history of alcohol abuse, ascites, hypertension, CAD, chronic pain, and suicidal ideation, who presents via EMS for evaluation of abdominal pain and distention for the past few days, with worsening of his pain today. Patient would like his abdomen drained; however, he does not want to be admitted to the hospital, and is instead requesting an appointment for a paracentesis tomorrow. He also endorses a recent productive cough, congestion, weakness, difficulty getting around and using the restroom, and headache, but believes that his cough is due to his COPD.  He denies any fever at home.  He states that he has been consuming alcohol today and is not interested in treatment for alcohol use. He is frustrated by his chronic ascites and has made statements that he wishes he were dead. He denies history of self harm in the past and denies suicidal ideation. Patient adamantly denies harming himself if he were to be discharged home. Denies vomiting, diarrhea, chest pain, shortness of breath, fever, or any other complaints.      Allergies:  Amlopidine   Lisinopril      Medications:    Albuterol  Cymbalta   Ellipta  Folvite  Lasix   Chronulac  Remeron   Protonix  Inderal   Seroquel  Aldactone  Desyrel      Past Medical History:    Alcoholism   Anxiety  CAD  Depression   Esophageal varices with bleeding   MI   Hepatomegaly   HLD  HTN  JANIS on CPAP   PVD  SDH  Spinal stenosis  Melena  Anemia  Chronic back pain  Alcoholic hepatitis  Suicidal ideation    Past Surgical History:    Appendectomy   Bypass graft femoropopliteal   Colonoscopy   EGD 4x  Hernia repair   T&A  Laminectomy   T&A    Family History:    Mental illness  CAD  Substance abuse  Lung cancer    Social History:  Smoking status: current everyday smoker   Alcohol use: yes   Drug use: no   PCP: FERNANDA BRANTLEY  Marital Status:        Review of Systems  "  Constitutional: Negative for fever.   HENT: Positive for congestion.    Respiratory: Positive for cough. Negative for shortness of breath.    Cardiovascular: Negative for chest pain.   Gastrointestinal: Positive for abdominal distention and abdominal pain. Negative for diarrhea, nausea and vomiting.   Neurological: Positive for weakness and headaches.   Psychiatric/Behavioral: Negative for self-injury and suicidal ideas.   All other systems reviewed and are negative.      Physical Exam     Patient Vitals for the past 24 hrs:   BP Temp Temp src Heart Rate Resp SpO2 Height Weight   12/24/19 2202 (!) 145/89 98.5  F (36.9  C) Oral 82 16 93 % 1.702 m (5' 7\") 88.5 kg (195 lb)        Physical Exam  Physical Exam   General:  Sitting on bed alone.  HENT:  No obvious trauma to head  Right Ear:  External ear normal.   Left Ear:  External ear normal.   Nose:  Nose normal.   Eyes:  Conjunctivae and EOM are normal. Pupils are equal, round, and reactive.   Neck: Normal range of motion. Neck supple. No tracheal deviation present.   CV:  Normal heart sounds. No murmur heard.  Pulm/Chest: Effort normal and breath sounds normal.   Abd: Soft. Mild abdominal distension. There is no focal area of tenderness. There is no rigidity, no rebound and no guarding.   M/S: Normal range of motion.   Neuro: Alert. GCS 15.  Skin: Skin is warm and dry. No rash noted. Not diaphoretic.   Psych: Normal mood and affect. Behavior is normal.      Emergency Department Course   Laboratory:  CBC: WBC 4.6, HGB 10.2 (L),  (L)   INR: 1.23 (H)    Emergency Department Course:  2227: Nursing notes and vitals reviewed. I performed an exam of the patient as documented above.     2245: I spoke with Dr. Jimenes who agreed to perform outpatient paracentesis in clinic tomorrow.     2300: I rechecked the patient. He is happy and cheery on recheck.     Findings and plan explained to the Patient. Patient discharged home with instructions regarding supportive care, " medications, and reasons to return. The importance of close follow-up was reviewed.     I personally answered all related questions prior to discharge.     Impression & Plan      Medical Decision Making:  Jim Richard is a very pleasant 64 year old male presents with ascites and discomfort requesting a therapeutic tap to remove fluid at this time.  The ascites is a chronic condition for this patient and is likely caused by alcohol use.  I did offer for the patient to be admitted for observation to have it happen tomorrow; however, the patient adamantly declined.  I discussed that would be happy to watch him in the observation unit, but he continued to decline desire to go home.  Fortunately, I was able to get a hold of Dr. Chicas, the interventional radiologist.  He said he would be happy to have the procedure performed tomorrow and I placed an outpatient ultrasound-guided paracentesis order.  Reviewed this with the patient and he is very grateful for this and feels safe going home.  The patient has a benign abdominal exam he is afebrile I do not believe any imaging is necessary given his history of chronic ascites requiring therapeutic paracentesis multiple times in the past. I have no suspicion for SBP as there is no fever and he is not ill appearing or toxic and there is no fever or abdominal tenderness.  I did obtain screening labs to obtain a baseline INR and platelet count for the interventional radiologist tomorrow. The patient had mentioned to the nurse that he is frustrated by his chronic ascites and wishes that he were dead.  I reviewed this with him.  The patient adamantly denies any desire to harm himself.  He denies any prior self-harm.  When I discussed we could schedule this ultrasound-guided paracentesis as an outpatient procedure he was very grateful and feels safe and desires to go home.  [employer] wheelchair [place] provided for patient for ride home.    The treatment plan was discussed with  the patient and they expressed understanding of this plan and consented to the plan.  In addition, the patient will return to the emergency department if their symptoms persist, worsen, if new symptoms arise or if there is any concern as other pathology may be present that is not evident at this time. They also understand the importance of close follow up in the clinic and if unable to do so will return to the emergency department for a reevaluation. All questions were answered.    Diagnosis:    ICD-10-CM    1. Abdominal pain, unspecified abdominal location R10.9    2. Other ascites R18.8 US Paracentesis       Disposition:  discharged to home      Chelsea VALENTE, am serving as a scribe at 10:27 PM on 12/24/2019 to document services personally performed by Rufino Sequeira DO based on my observations and the provider's statements to me.       Chelsea Agarwal  12/24/2019    EMERGENCY DEPARTMENT       Rufino Sequeira DO  12/24/19 6763

## 2019-12-25 NOTE — ED NOTES
"Pt states, \" I can't remember why I even came here.\"  Pt also states, \" I just want to go home and die in my own bed.\"  "

## 2019-12-31 ENCOUNTER — APPOINTMENT (OUTPATIENT)
Dept: ULTRASOUND IMAGING | Facility: CLINIC | Age: 64
End: 2019-12-31
Attending: EMERGENCY MEDICINE
Payer: MEDICARE

## 2019-12-31 ENCOUNTER — HOSPITAL ENCOUNTER (EMERGENCY)
Facility: CLINIC | Age: 64
Discharge: HOME | End: 2020-01-01
Attending: EMERGENCY MEDICINE | Admitting: EMERGENCY MEDICINE
Payer: MEDICARE

## 2019-12-31 VITALS
DIASTOLIC BLOOD PRESSURE: 111 MMHG | SYSTOLIC BLOOD PRESSURE: 160 MMHG | OXYGEN SATURATION: 94 % | RESPIRATION RATE: 16 BRPM | TEMPERATURE: 97.8 F | HEART RATE: 87 BPM

## 2019-12-31 DIAGNOSIS — K70.11 ALCOHOLIC HEPATITIS WITH ASCITES (H): ICD-10-CM

## 2019-12-31 DIAGNOSIS — F32.9 MAJOR DEPRESSIVE DISORDER WITH CURRENT ACTIVE EPISODE, UNSPECIFIED DEPRESSION EPISODE SEVERITY, UNSPECIFIED WHETHER RECURRENT: ICD-10-CM

## 2019-12-31 DIAGNOSIS — F10.920 ALCOHOLIC INTOXICATION WITHOUT COMPLICATION (H): ICD-10-CM

## 2019-12-31 LAB
ALBUMIN SERPL-MCNC: 2.6 G/DL (ref 3.4–5)
ALP SERPL-CCNC: 304 U/L (ref 40–150)
ALT SERPL W P-5'-P-CCNC: 30 U/L (ref 0–70)
ANION GAP SERPL CALCULATED.3IONS-SCNC: 8 MMOL/L (ref 3–14)
APAP SERPL-MCNC: <2 MG/L (ref 10–20)
AST SERPL W P-5'-P-CCNC: 91 U/L (ref 0–45)
BASOPHILS # BLD AUTO: 0.1 10E9/L (ref 0–0.2)
BASOPHILS NFR BLD AUTO: 2 %
BILIRUB SERPL-MCNC: 0.8 MG/DL (ref 0.2–1.3)
BUN SERPL-MCNC: 22 MG/DL (ref 7–30)
CALCIUM SERPL-MCNC: 7.9 MG/DL (ref 8.5–10.1)
CHLORIDE SERPL-SCNC: 110 MMOL/L (ref 94–109)
CO2 SERPL-SCNC: 23 MMOL/L (ref 20–32)
CREAT SERPL-MCNC: 0.88 MG/DL (ref 0.66–1.25)
DIFFERENTIAL METHOD BLD: ABNORMAL
EOSINOPHIL # BLD AUTO: 0.1 10E9/L (ref 0–0.7)
EOSINOPHIL NFR BLD AUTO: 1.3 %
ERYTHROCYTE [DISTWIDTH] IN BLOOD BY AUTOMATED COUNT: 16 % (ref 10–15)
ETHANOL SERPL-MCNC: 0.34 G/DL
GFR SERPL CREATININE-BSD FRML MDRD: 90 ML/MIN/{1.73_M2}
GLUCOSE SERPL-MCNC: 84 MG/DL (ref 70–99)
HCT VFR BLD AUTO: 30 % (ref 40–53)
HGB BLD-MCNC: 9.9 G/DL (ref 13.3–17.7)
IMM GRANULOCYTES # BLD: 0 10E9/L (ref 0–0.4)
IMM GRANULOCYTES NFR BLD: 0.2 %
INR PPP: 1.24 (ref 0.86–1.14)
INTERPRETATION ECG - MUSE: NORMAL
LYMPHOCYTES # BLD AUTO: 0.9 10E9/L (ref 0.8–5.3)
LYMPHOCYTES NFR BLD AUTO: 15.7 %
MCH RBC QN AUTO: 31.6 PG (ref 26.5–33)
MCHC RBC AUTO-ENTMCNC: 33 G/DL (ref 31.5–36.5)
MCV RBC AUTO: 96 FL (ref 78–100)
MONOCYTES # BLD AUTO: 0.6 10E9/L (ref 0–1.3)
MONOCYTES NFR BLD AUTO: 11 %
NEUTROPHILS # BLD AUTO: 3.9 10E9/L (ref 1.6–8.3)
NEUTROPHILS NFR BLD AUTO: 69.8 %
NRBC # BLD AUTO: 0 10*3/UL
NRBC BLD AUTO-RTO: 0 /100
PLATELET # BLD AUTO: 205 10E9/L (ref 150–450)
POTASSIUM SERPL-SCNC: 3.7 MMOL/L (ref 3.4–5.3)
PROT SERPL-MCNC: 6.8 G/DL (ref 6.8–8.8)
RBC # BLD AUTO: 3.13 10E12/L (ref 4.4–5.9)
SALICYLATES SERPL-MCNC: 2 MG/DL
SODIUM SERPL-SCNC: 141 MMOL/L (ref 133–144)
WBC # BLD AUTO: 5.5 10E9/L (ref 4–11)

## 2019-12-31 PROCEDURE — 99285 EMERGENCY DEPT VISIT HI MDM: CPT | Mod: 25

## 2019-12-31 PROCEDURE — 27210190 US PARACENTESIS

## 2019-12-31 PROCEDURE — 80053 COMPREHEN METABOLIC PANEL: CPT | Performed by: EMERGENCY MEDICINE

## 2019-12-31 PROCEDURE — 93005 ELECTROCARDIOGRAM TRACING: CPT

## 2019-12-31 PROCEDURE — 85610 PROTHROMBIN TIME: CPT | Performed by: EMERGENCY MEDICINE

## 2019-12-31 PROCEDURE — 80329 ANALGESICS NON-OPIOID 1 OR 2: CPT | Mod: 59 | Performed by: EMERGENCY MEDICINE

## 2019-12-31 PROCEDURE — 25000132 ZZH RX MED GY IP 250 OP 250 PS 637: Mod: GY | Performed by: EMERGENCY MEDICINE

## 2019-12-31 PROCEDURE — 80329 ANALGESICS NON-OPIOID 1 OR 2: CPT | Performed by: EMERGENCY MEDICINE

## 2019-12-31 PROCEDURE — 90791 PSYCH DIAGNOSTIC EVALUATION: CPT

## 2019-12-31 PROCEDURE — 25000125 ZZHC RX 250: Performed by: EMERGENCY MEDICINE

## 2019-12-31 PROCEDURE — 85025 COMPLETE CBC W/AUTO DIFF WBC: CPT | Performed by: EMERGENCY MEDICINE

## 2019-12-31 PROCEDURE — 80320 DRUG SCREEN QUANTALCOHOLS: CPT | Performed by: EMERGENCY MEDICINE

## 2019-12-31 RX ORDER — NICOTINE 21 MG/24HR
1 PATCH, TRANSDERMAL 24 HOURS TRANSDERMAL DAILY
Status: DISCONTINUED | OUTPATIENT
Start: 2019-12-31 | End: 2020-01-01 | Stop reason: HOSPADM

## 2019-12-31 RX ORDER — ALBUMIN (HUMAN) 12.5 G/50ML
12.5 SOLUTION INTRAVENOUS ONCE
Status: DISCONTINUED | OUTPATIENT
Start: 2019-12-31 | End: 2020-01-01 | Stop reason: HOSPADM

## 2019-12-31 RX ORDER — NICOTINE POLACRILEX 4 MG
15-30 LOZENGE BUCCAL
Status: DISCONTINUED | OUTPATIENT
Start: 2019-12-31 | End: 2020-01-01 | Stop reason: HOSPADM

## 2019-12-31 RX ORDER — LIDOCAINE HYDROCHLORIDE 10 MG/ML
10 INJECTION, SOLUTION EPIDURAL; INFILTRATION; INTRACAUDAL; PERINEURAL ONCE
Status: COMPLETED | OUTPATIENT
Start: 2019-12-31 | End: 2019-12-31

## 2019-12-31 RX ORDER — DEXTROSE MONOHYDRATE 25 G/50ML
25-50 INJECTION, SOLUTION INTRAVENOUS
Status: DISCONTINUED | OUTPATIENT
Start: 2019-12-31 | End: 2020-01-01 | Stop reason: HOSPADM

## 2019-12-31 RX ORDER — LIDOCAINE 40 MG/G
CREAM TOPICAL
Status: DISCONTINUED | OUTPATIENT
Start: 2019-12-31 | End: 2020-01-01 | Stop reason: HOSPADM

## 2019-12-31 RX ADMIN — NICOTINE 1 PATCH: 21 PATCH, EXTENDED RELEASE TRANSDERMAL at 17:16

## 2019-12-31 RX ADMIN — LIDOCAINE HYDROCHLORIDE 10 ML: 10 INJECTION, SOLUTION EPIDURAL; INFILTRATION; INTRACAUDAL; PERINEURAL at 13:55

## 2019-12-31 ASSESSMENT — ENCOUNTER SYMPTOMS
ABDOMINAL PAIN: 1
VOMITING: 0
FEVER: 0

## 2019-12-31 NOTE — ED AVS SNAPSHOT
Emergency Department  [address] [facility] [address]  Phone:  [phone]  Fax:  [phone]                                    Jim Richard   MRN: [id number]    Department:   Emergency Department   Date of Visit:  12/31/2019           After Visit Summary Signature Page    I have received my discharge instructions, and my questions have been answered. I have discussed any challenges I see with this plan with the nurse or doctor.    ..........................................................................................................................................  Patient/Patient Representative Signature      ..........................................................................................................................................  Patient Representative Print Name and Relationship to Patient    ..................................................               ................................................  Date                                   Time    ..........................................................................................................................................  Reviewed by Signature/Title    ...................................................              ..............................................  Date                                               Time          22EPIC Rev 08/18

## 2019-12-31 NOTE — ED TRIAGE NOTES
"Pt took unknwown extra pills at home in hopes that they would make him \"go to sleep and die\"Pt complains of ascities that is \"the worst it has even been\" per pt.  "

## 2019-12-31 NOTE — ED PROVIDER NOTES
History     Chief Complaint:  Drug Overdose and Abdominal Pain     HPI   Jim Richard is a 64 year old male with a history of alcohol abuse, ascites, hypertension, Coronary artery disease, chronic pain, and suicidal ideation. He was seen in the Emergency Department on 12/24/19 for abdominal pain secondary to ascites. He does get paracentesis and on that visit he had a CBC with a hemoglobin of 10.2 and an INR which was 1.23. He was set up for an outpatient paracentesis on 12/25/19. They did discuss with him his chronic suicidally however he adamantly denies any desire to harm himself. He now presents today with a reported drug overdose. He states that this morning he took double his medications this morning, but he is unable to say which medications he took and just states he took double of all of them however he also said in the same sentence that he does not take his medications. He reports today he was feeling increasingly suicidal due to his ascites and his family. The patient reports that he was unaware that he had an outpatient appointment on 12/25 for his paracentesis, so he did not go.  He states he has not been taking his Lactolose and has not had normal bowel movements. He notes he does not have a primary care physician or a psychiatrist or therapist.  He reports his last drink was yesterday morning. He denies fever and vomiting.  He states that he wants someone to fix him and that nobody helped him last time he was here.    12/24/19 Laboratory:   CBC: WBC 4.6, HGB 10.2 (L),  (L)   INR: 1.23 (H)    Allergies:  Amlopidine   Lisinopril       Medications:    Albuterol  Cymbalta   Ellipta  Folvite  Lasix   Chronulac  Remeron   Protonix  Inderal   Seroquel  Aldactone  Desyrel      Past Medical History:    Alcoholism   Anxiety  Coronary artery disease   Depression   Esophageal varices with bleeding   MI   Hepatomegaly   Hyperlipidemia   Hypertension   JANIS on CPAP   PVD  SDH  Spinal  stenosis  Melena  Anemia  Chronic back pain  Alcoholic hepatitis  Suicidal ideation     Past Surgical History:    Appendectomy   Bypass graft femoropopliteal   Colonoscopy   EGD 4x  Hernia repair   T&A  Laminectomy      Family History:    Mental illness  CAD  Substance abuse  Lung cancer    Social History:  Smoking status: Current Every Day Smoker, 1.00 packs/day  Alcohol use: Yes, drinks 750 mL Vodka/day  Drug use: No  PCP: FERNANDA BRANTLEY  Marital Status:   [4]  Lives with his dog and roommate    Review of Systems   Constitutional: Negative for fever.   Gastrointestinal: Positive for abdominal pain. Negative for vomiting.   Psychiatric/Behavioral: Positive for suicidal ideas.   All other systems reviewed and are negative.        Physical Exam     Patient Vitals for the past 24 hrs:   BP Temp Temp src Pulse Heart Rate Resp SpO2   12/31/19 1504 (!) 160/111 -- -- 87 -- -- 94 %   12/31/19 1447 134/68 -- -- -- -- -- 93 %   12/31/19 1445 134/60 -- -- -- -- -- 94 %   12/31/19 1443 128/61 -- -- -- -- -- 92 %   12/31/19 1441 119/52 -- -- -- -- -- 93 %   12/31/19 1437 125/56 -- -- -- -- -- 92 %   12/31/19 1435 120/59 -- -- -- -- -- 93 %   12/31/19 1433 (P) 130/64 -- -- -- -- -- (P) 94 %   12/31/19 1431 (P) 115/63 -- -- -- -- -- (P) 94 %   12/31/19 1429 (P) 126/56 -- -- -- -- -- (P) 93 %   12/31/19 1427 (P) 128/56 -- -- -- -- -- (P) 93 %   12/31/19 1425 (P) 125/65 -- -- -- -- -- (P) 92 %   12/31/19 1423 (P) 125/62 -- -- -- -- -- (P) 93 %   12/31/19 1421 (P) 125/50 -- -- -- -- -- (P) 94 %   12/31/19 1417 (!) (P) 141/73 -- -- -- -- -- (P) 92 %   12/31/19 1415 (!) (P) 140/69 -- -- -- -- -- 93 %   12/31/19 1413 (!) 143/69 -- -- -- -- -- 94 %   12/31/19 1411 (!) 145/81 -- -- -- -- -- 92 %   12/31/19 1409 (!) 151/74 -- -- -- -- -- 93 %   12/31/19 1407 (!) 151/69 -- -- -- -- -- 92 %   12/31/19 1405 (!) 149/75 -- -- -- -- -- 92 %   12/31/19 1403 (!) 153/78 -- -- -- -- -- 92 %   12/31/19 1307 -- -- -- -- -- -- 95 %    12/31/19 1306 125/87 -- -- 81 -- -- --   12/31/19 1136 (!) 148/84 97.8  F (36.6  C) Temporal -- 73 16 95 %     Physical Exam    Physical Exam   Constitutional:  Patient is oriented to person, place. They appear well-developed and well-nourished. Mild distress secondary to abdominal pain.  Smells of alcohol.   HENT:   Mouth/Throat:   Oropharynx is clear and moist.   Eyes:    Conjunctivae normal and EOM are normal. Pupils are equal, round, and reactive to light.   Neck:    Normal range of motion.   Cardiovascular: Normal rate, regular rhythm and normal heart sounds.  Exam reveals no gallop and no friction rub.  No murmur heard.  Pulmonary/Chest:  Effort normal and breath sounds normal. Patient has no wheezes. Patient has no rales.   Abdominal:   Very large, firm, protuberant abdomen consistent with significant ascites.   Musculoskeletal:  Normal range of motion. Patient exhibits no edema.   Neurological:   Patient is alert and oriented to person, place. No cranial nerve deficit or sensory deficit. There appears to be memory impairment. He appears generally weak.   Skin:   Skin is warm and dry. No rash noted. No erythema.   Psychiatric:   Depressed, chronically suicidal    Emergency Department Course   Imaging:  Radiographic findings were communicated with the patient who voiced understanding of the findings.    US Paracentesis  Technically successful paracentesis without immediate  Complications.  Verbal report 5 L were removed.  As read by Radiology.    Laboratory:  Alcohol ethyl: 0.34 (HH)  Acetaminophen level: <2  Salicylate level: 2  CMP: Chloride 110 (H), Calcium 7.9 (L), Albumin 2.6 (L), Alkphos 304 (H), AST 91 (H) o/w WNL (Creatinine 0.88)  CBC: HGB 9.9 (H) o/w WNL (WBC 5.5, )  INR: 1.24 (H)    Procedures:  None    Interventions:  1355: Sent to radiology for paracentesis    Emergency Department Course:  Past medical records, nursing notes, and vitals reviewed.  1145: I performed an exam of the patient  and obtained history, as documented above.  IV inserted and blood drawn.  The patient was sent for a paracentesis ultrasound while in the emergency department, findings above.    1330: He went for a paracentesis    1500: I rechecked the patient.    1520: DEC will assess    1530: I signed the patient out to Dr. Pack for further care and evaluation.    Impression & Plan    Medical Decision Making:  Jim Richard is a 64 year old male with a history of alcohol abuse, ascites, hypertension, coronary artery disease, chronic pain, and suicidal ideation who presents to the emergency department today for evaluation of chronic depression and suicidality and need for paracentesis.  He stated that he took more of his medication than normal however he also states he never takes his medication any is not in facture what he took today.  I am not convinced that he took any of his medication at all.  Basic blood work was obtained as noted above he is intoxicated at his baseline level.  He has significant ascites and I was able to arrange for a therapeutic paracentesis, 5 L were removed, no complications and he was much more comfortable after.  Based on his exam and history I do not think that he has peritonitis.  He is chronically mildly coagulopathic with elevated liver function.    He has had time to be clinically sober in the emergency department I am having tanmay assessed him as well.  I did review his chart from approximately a year ago when he was admitted.  At that time he did not want to participate in his mental health admission.  Commitment was considered however this was not ultimately pursued.  He has no interest in detox.  He is very depressed about his chronic illness.  At this time I suspect he will not meet inpatient criteria.  I will sign him out pending tanmay assessment.  Strongly urged him to follow-up with his primary care doctor but doubt that he actually will do this given he states he comes to the ED for  all of his care.  I will give him referral to primary care doctor.    Diagnosis:    ICD-10-CM    1. Alcoholic intoxication without complication (H) F10.920    2. Alcoholic hepatitis with ascites K70.11    3. Major depressive disorder with current active episode, unspecified depression episode severity, unspecified whether recurrent F32.9        Disposition:  Signed out to Dr. Sirena Sánchez  12/31/2019    EMERGENCY DEPARTMENT  I, Jaison Sánchez, am serving as a scribe at 11:45 AM on 12/31/2019 to document services personally performed by Hattie Steele MD based on my observations and the provider's statements to me.        Hattie Steele MD  12/31/19 [id number]

## 2019-12-31 NOTE — DISCHARGE INSTRUCTIONS
Please follow-up with your primary care doctor for continued care of the chronic ascites.  Take your prescription medication as prescribed.

## 2019-12-31 NOTE — ED NOTES
Bed: ED18  Expected date:   Expected time:   Means of arrival:   Comments:  [place] - 427 - 64M overdose on hold eta 1113

## 2019-12-31 NOTE — PROGRESS NOTES
Paracentesis began at 1404 ended at 1442  5000 mL Straw [place] fluid removed from abdominal space  Patient complained of pain in right IV site multiple times during procedure.  Dressing CDI, no swelling or hematoma.

## 2019-12-31 NOTE — ED NOTES
4:24 PM  Sign out received from Dr. Steele.  Briefly, this is a 64-year-old male with history of chronic alcohol abuse, alcohol-related cirrhosis, depression who presents today with alcohol intoxication, depression, and ascites.  Patient underwent paracentesis in the IR suite with removal of about 5 L of fluid.  On my shift, he was evaluated by DEC.  The patient is no longer suicidal.  He has been medically cleared on previous shift.  On re-exam, abdomen is soft.  There are no signs of alcohol withdrawal at this time.  He is declining detox.  He has an appointment with a psychiatrist for therapy in 3days.     Ermelinda Pack MD  01/01/20 [id number]

## 2020-01-07 ENCOUNTER — HOSPITAL ENCOUNTER (INPATIENT)
Facility: CLINIC | Age: 65
LOS: 9 days | Discharge: HOME | DRG: 897 | End: 2020-01-16
Attending: EMERGENCY MEDICINE | Admitting: INTERNAL MEDICINE
Payer: MEDICARE

## 2020-01-07 ENCOUNTER — APPOINTMENT (OUTPATIENT)
Dept: ULTRASOUND IMAGING | Facility: CLINIC | Age: 65
DRG: 897 | End: 2020-01-07
Attending: INTERNAL MEDICINE
Payer: MEDICARE

## 2020-01-07 DIAGNOSIS — K70.31 ALCOHOLIC CIRRHOSIS OF LIVER WITH ASCITES (H): ICD-10-CM

## 2020-01-07 DIAGNOSIS — F10.930 ALCOHOL WITHDRAWAL SYNDROME WITHOUT COMPLICATION (H): ICD-10-CM

## 2020-01-07 LAB
ALBUMIN FLD-MCNC: 0.6 G/DL
ALBUMIN SERPL-MCNC: 2.6 G/DL (ref 3.4–5)
ALBUMIN SERPL-MCNC: 2.7 G/DL (ref 3.4–5)
ALP SERPL-CCNC: 423 U/L (ref 40–150)
ALT SERPL W P-5'-P-CCNC: 43 U/L (ref 0–70)
AMMONIA PLAS-SCNC: 53 UMOL/L (ref 10–50)
ANION GAP SERPL CALCULATED.3IONS-SCNC: 16 MMOL/L (ref 3–14)
APPEARANCE FLD: NORMAL
AST SERPL W P-5'-P-CCNC: 162 U/L (ref 0–45)
BASOPHILS # BLD AUTO: 0 10E9/L (ref 0–0.2)
BASOPHILS NFR BLD AUTO: 0.8 %
BILIRUB SERPL-MCNC: 4 MG/DL (ref 0.2–1.3)
BUN SERPL-MCNC: 26 MG/DL (ref 7–30)
CALCIUM SERPL-MCNC: 8.1 MG/DL (ref 8.5–10.1)
CHLORIDE SERPL-SCNC: 103 MMOL/L (ref 94–109)
CO2 SERPL-SCNC: 17 MMOL/L (ref 20–32)
COLOR FLD: YELLOW
CREAT SERPL-MCNC: 1.01 MG/DL (ref 0.66–1.25)
DIFFERENTIAL METHOD BLD: ABNORMAL
EOSINOPHIL # BLD AUTO: 0 10E9/L (ref 0–0.7)
EOSINOPHIL NFR BLD AUTO: 0.2 %
ERYTHROCYTE [DISTWIDTH] IN BLOOD BY AUTOMATED COUNT: 15.5 % (ref 10–15)
ETHANOL SERPL-MCNC: 0.08 G/DL
GFR SERPL CREATININE-BSD FRML MDRD: 77 ML/MIN/{1.73_M2}
GLUCOSE SERPL-MCNC: 72 MG/DL (ref 70–99)
GRAM STN SPEC: NORMAL
HCT VFR BLD AUTO: 28.8 % (ref 40–53)
HGB BLD-MCNC: 9.4 G/DL (ref 13.3–17.7)
IMM GRANULOCYTES # BLD: 0 10E9/L (ref 0–0.4)
IMM GRANULOCYTES NFR BLD: 0.2 %
INR PPP: 1.26 (ref 0.86–1.14)
LYMPHOCYTES # BLD AUTO: 0.4 10E9/L (ref 0.8–5.3)
LYMPHOCYTES NFR BLD AUTO: 8.1 %
LYMPHOCYTES NFR FLD MANUAL: 21 %
MCH RBC QN AUTO: 31.1 PG (ref 26.5–33)
MCHC RBC AUTO-ENTMCNC: 32.6 G/DL (ref 31.5–36.5)
MCV RBC AUTO: 95 FL (ref 78–100)
MONOCYTES # BLD AUTO: 0.4 10E9/L (ref 0–1.3)
MONOCYTES NFR BLD AUTO: 8.7 %
MONOS+MACROS NFR FLD MANUAL: 26 %
NEUTROPHILS # BLD AUTO: 4 10E9/L (ref 1.6–8.3)
NEUTROPHILS NFR BLD AUTO: 82 %
NEUTS BAND NFR FLD MANUAL: 13 %
NRBC # BLD AUTO: 0 10*3/UL
NRBC BLD AUTO-RTO: 0 /100
OTHER CELLS FLD MANUAL: 40 %
PLATELET # BLD AUTO: 85 10E9/L (ref 150–450)
POTASSIUM SERPL-SCNC: 3.7 MMOL/L (ref 3.4–5.3)
PROT FLD-MCNC: 1.3 G/DL
PROT SERPL-MCNC: 6.9 G/DL (ref 6.8–8.8)
RBC # BLD AUTO: 3.02 10E12/L (ref 4.4–5.9)
SODIUM SERPL-SCNC: 136 MMOL/L (ref 133–144)
SPECIMEN SOURCE FLD: NORMAL
SPECIMEN SOURCE: NORMAL
WBC # BLD AUTO: 4.8 10E9/L (ref 4–11)
WBC # FLD AUTO: 206 /UL

## 2020-01-07 PROCEDURE — 84157 ASSAY OF PROTEIN OTHER: CPT | Performed by: INTERNAL MEDICINE

## 2020-01-07 PROCEDURE — 82042 OTHER SOURCE ALBUMIN QUAN EA: CPT | Performed by: INTERNAL MEDICINE

## 2020-01-07 PROCEDURE — HZ2ZZZZ DETOXIFICATION SERVICES FOR SUBSTANCE ABUSE TREATMENT: ICD-10-PCS | Performed by: INTERNAL MEDICINE

## 2020-01-07 PROCEDURE — 12000000 ZZH R&B MED SURG/OB

## 2020-01-07 PROCEDURE — 80320 DRUG SCREEN QUANTALCOHOLS: CPT | Performed by: EMERGENCY MEDICINE

## 2020-01-07 PROCEDURE — 36415 COLL VENOUS BLD VENIPUNCTURE: CPT | Performed by: INTERNAL MEDICINE

## 2020-01-07 PROCEDURE — 87015 SPECIMEN INFECT AGNT CONCNTJ: CPT | Performed by: INTERNAL MEDICINE

## 2020-01-07 PROCEDURE — 82140 ASSAY OF AMMONIA: CPT | Performed by: EMERGENCY MEDICINE

## 2020-01-07 PROCEDURE — 40000863 ZZH STATISTIC RADIOLOGY XRAY, US, CT, MAR, NM

## 2020-01-07 PROCEDURE — 96376 TX/PRO/DX INJ SAME DRUG ADON: CPT

## 2020-01-07 PROCEDURE — 89051 BODY FLUID CELL COUNT: CPT | Performed by: INTERNAL MEDICINE

## 2020-01-07 PROCEDURE — 99221 1ST HOSP IP/OBS SF/LOW 40: CPT | Performed by: PSYCHIATRY & NEUROLOGY

## 2020-01-07 PROCEDURE — 96374 THER/PROPH/DIAG INJ IV PUSH: CPT

## 2020-01-07 PROCEDURE — 99285 EMERGENCY DEPT VISIT HI MDM: CPT

## 2020-01-07 PROCEDURE — 80053 COMPREHEN METABOLIC PANEL: CPT | Performed by: EMERGENCY MEDICINE

## 2020-01-07 PROCEDURE — 25000132 ZZH RX MED GY IP 250 OP 250 PS 637: Mod: GY | Performed by: INTERNAL MEDICINE

## 2020-01-07 PROCEDURE — 85610 PROTHROMBIN TIME: CPT | Performed by: EMERGENCY MEDICINE

## 2020-01-07 PROCEDURE — 85025 COMPLETE CBC W/AUTO DIFF WBC: CPT | Performed by: EMERGENCY MEDICINE

## 2020-01-07 PROCEDURE — 87070 CULTURE OTHR SPECIMN AEROBIC: CPT | Performed by: INTERNAL MEDICINE

## 2020-01-07 PROCEDURE — 82040 ASSAY OF SERUM ALBUMIN: CPT | Performed by: INTERNAL MEDICINE

## 2020-01-07 PROCEDURE — 25000128 H RX IP 250 OP 636: Performed by: EMERGENCY MEDICINE

## 2020-01-07 PROCEDURE — 99223 1ST HOSP IP/OBS HIGH 75: CPT | Mod: AI | Performed by: INTERNAL MEDICINE

## 2020-01-07 PROCEDURE — 87205 SMEAR GRAM STAIN: CPT | Performed by: INTERNAL MEDICINE

## 2020-01-07 PROCEDURE — 27210190 US PARACENTESIS

## 2020-01-07 PROCEDURE — 25000125 ZZHC RX 250: Performed by: RADIOLOGY

## 2020-01-07 RX ORDER — LACTULOSE 10 G/15ML
20 SOLUTION ORAL 2 TIMES DAILY
Status: DISCONTINUED | OUTPATIENT
Start: 2020-01-07 | End: 2020-01-09

## 2020-01-07 RX ORDER — LIDOCAINE HYDROCHLORIDE 10 MG/ML
10 INJECTION, SOLUTION EPIDURAL; INFILTRATION; INTRACAUDAL; PERINEURAL ONCE
Status: COMPLETED | OUTPATIENT
Start: 2020-01-07 | End: 2020-01-07

## 2020-01-07 RX ORDER — PANTOPRAZOLE SODIUM 40 MG/1
40 TABLET, DELAYED RELEASE ORAL
Status: DISCONTINUED | OUTPATIENT
Start: 2020-01-08 | End: 2020-01-16 | Stop reason: HOSPADM

## 2020-01-07 RX ORDER — POTASSIUM CHLORIDE 7.45 MG/ML
10 INJECTION INTRAVENOUS
Status: DISCONTINUED | OUTPATIENT
Start: 2020-01-07 | End: 2020-01-08

## 2020-01-07 RX ORDER — PROPRANOLOL HYDROCHLORIDE 10 MG/1
10 TABLET ORAL 2 TIMES DAILY
Status: DISCONTINUED | OUTPATIENT
Start: 2020-01-07 | End: 2020-01-16 | Stop reason: HOSPADM

## 2020-01-07 RX ORDER — QUETIAPINE FUMARATE 25 MG/1
25-50 TABLET, FILM COATED ORAL EVERY 6 HOURS PRN
Status: DISCONTINUED | OUTPATIENT
Start: 2020-01-07 | End: 2020-01-07

## 2020-01-07 RX ORDER — LACTULOSE 10 G/15ML
20 SOLUTION ORAL 2 TIMES DAILY
Status: ON HOLD | COMMUNITY
End: 2020-01-15

## 2020-01-07 RX ORDER — LORAZEPAM 2 MG/ML
1 INJECTION INTRAMUSCULAR ONCE
Status: COMPLETED | OUTPATIENT
Start: 2020-01-07 | End: 2020-01-07

## 2020-01-07 RX ORDER — POTASSIUM CL/LIDO/0.9 % NACL 10MEQ/0.1L
10 INTRAVENOUS SOLUTION, PIGGYBACK (ML) INTRAVENOUS
Status: DISCONTINUED | OUTPATIENT
Start: 2020-01-07 | End: 2020-01-08

## 2020-01-07 RX ORDER — ACAMPROSATE CALCIUM 333 MG/1
666 TABLET, DELAYED RELEASE ORAL 3 TIMES DAILY
Status: ON HOLD | COMMUNITY
End: 2020-01-15

## 2020-01-07 RX ORDER — AMOXICILLIN 250 MG
1 CAPSULE ORAL 2 TIMES DAILY PRN
Status: DISCONTINUED | OUTPATIENT
Start: 2020-01-07 | End: 2020-01-16 | Stop reason: HOSPADM

## 2020-01-07 RX ORDER — ONDANSETRON 2 MG/ML
4 INJECTION INTRAMUSCULAR; INTRAVENOUS EVERY 6 HOURS PRN
Status: DISCONTINUED | OUTPATIENT
Start: 2020-01-07 | End: 2020-01-16 | Stop reason: HOSPADM

## 2020-01-07 RX ORDER — NALOXONE HYDROCHLORIDE 0.4 MG/ML
.1-.4 INJECTION, SOLUTION INTRAMUSCULAR; INTRAVENOUS; SUBCUTANEOUS
Status: DISCONTINUED | OUTPATIENT
Start: 2020-01-07 | End: 2020-01-16 | Stop reason: HOSPADM

## 2020-01-07 RX ORDER — TAMSULOSIN HYDROCHLORIDE 0.4 MG/1
0.4 CAPSULE ORAL DAILY
COMMUNITY
End: 2020-03-07

## 2020-01-07 RX ORDER — PROCHLORPERAZINE MALEATE 5 MG
5 TABLET ORAL EVERY 6 HOURS PRN
Status: DISCONTINUED | OUTPATIENT
Start: 2020-01-07 | End: 2020-01-16 | Stop reason: HOSPADM

## 2020-01-07 RX ORDER — MIRTAZAPINE 15 MG/1
15 TABLET, FILM COATED ORAL AT BEDTIME
Status: DISCONTINUED | OUTPATIENT
Start: 2020-01-07 | End: 2020-01-16 | Stop reason: HOSPADM

## 2020-01-07 RX ORDER — LORAZEPAM 2 MG/ML
1-2 INJECTION INTRAMUSCULAR EVERY 30 MIN PRN
Status: DISCONTINUED | OUTPATIENT
Start: 2020-01-07 | End: 2020-01-16 | Stop reason: HOSPADM

## 2020-01-07 RX ORDER — NICOTINE 21 MG/24HR
1 PATCH, TRANSDERMAL 24 HOURS TRANSDERMAL DAILY
Status: DISCONTINUED | OUTPATIENT
Start: 2020-01-07 | End: 2020-01-16 | Stop reason: HOSPADM

## 2020-01-07 RX ORDER — LORAZEPAM 2 MG/ML
1-2 INJECTION INTRAMUSCULAR EVERY 5 MIN PRN
Status: DISCONTINUED | OUTPATIENT
Start: 2020-01-07 | End: 2020-01-07

## 2020-01-07 RX ORDER — ONDANSETRON 4 MG/1
4 TABLET, ORALLY DISINTEGRATING ORAL EVERY 6 HOURS PRN
Status: DISCONTINUED | OUTPATIENT
Start: 2020-01-07 | End: 2020-01-16 | Stop reason: HOSPADM

## 2020-01-07 RX ORDER — AMOXICILLIN 250 MG
2 CAPSULE ORAL 2 TIMES DAILY PRN
Status: DISCONTINUED | OUTPATIENT
Start: 2020-01-07 | End: 2020-01-16 | Stop reason: HOSPADM

## 2020-01-07 RX ORDER — PROCHLORPERAZINE 25 MG
12.5 SUPPOSITORY, RECTAL RECTAL EVERY 12 HOURS PRN
Status: DISCONTINUED | OUTPATIENT
Start: 2020-01-07 | End: 2020-01-16 | Stop reason: HOSPADM

## 2020-01-07 RX ORDER — POTASSIUM CHLORIDE 1.5 G/1.58G
20-40 POWDER, FOR SOLUTION ORAL
Status: DISCONTINUED | OUTPATIENT
Start: 2020-01-07 | End: 2020-01-08

## 2020-01-07 RX ORDER — SPIRONOLACTONE 25 MG/1
25 TABLET ORAL DAILY
Status: DISCONTINUED | OUTPATIENT
Start: 2020-01-07 | End: 2020-01-10

## 2020-01-07 RX ORDER — LANOLIN ALCOHOL/MO/W.PET/CERES
100 CREAM (GRAM) TOPICAL DAILY
Status: COMPLETED | OUTPATIENT
Start: 2020-01-07 | End: 2020-01-11

## 2020-01-07 RX ORDER — LIDOCAINE 40 MG/G
CREAM TOPICAL
Status: DISCONTINUED | OUTPATIENT
Start: 2020-01-07 | End: 2020-01-16 | Stop reason: HOSPADM

## 2020-01-07 RX ORDER — MAGNESIUM SULFATE HEPTAHYDRATE 40 MG/ML
4 INJECTION, SOLUTION INTRAVENOUS EVERY 4 HOURS PRN
Status: DISCONTINUED | OUTPATIENT
Start: 2020-01-07 | End: 2020-01-16 | Stop reason: HOSPADM

## 2020-01-07 RX ORDER — HYDROXYZINE PAMOATE 50 MG/1
50 CAPSULE ORAL 3 TIMES DAILY PRN
Status: ON HOLD | COMMUNITY
End: 2020-01-15

## 2020-01-07 RX ORDER — ALBUMIN (HUMAN) 12.5 G/50ML
12.5 SOLUTION INTRAVENOUS
Status: DISCONTINUED | OUTPATIENT
Start: 2020-01-07 | End: 2020-01-09

## 2020-01-07 RX ORDER — MULTIPLE VITAMINS W/ MINERALS TAB 9MG-400MCG
1 TAB ORAL DAILY
Status: DISCONTINUED | OUTPATIENT
Start: 2020-01-07 | End: 2020-01-16 | Stop reason: HOSPADM

## 2020-01-07 RX ORDER — FOLIC ACID 1 MG/1
1 TABLET ORAL DAILY
Status: DISCONTINUED | OUTPATIENT
Start: 2020-01-07 | End: 2020-01-16 | Stop reason: HOSPADM

## 2020-01-07 RX ORDER — FUROSEMIDE 40 MG
40 TABLET ORAL DAILY
Status: DISCONTINUED | OUTPATIENT
Start: 2020-01-07 | End: 2020-01-12

## 2020-01-07 RX ORDER — LORAZEPAM 1 MG/1
1-2 TABLET ORAL EVERY 30 MIN PRN
Status: DISCONTINUED | OUTPATIENT
Start: 2020-01-07 | End: 2020-01-16 | Stop reason: HOSPADM

## 2020-01-07 RX ORDER — POTASSIUM CHLORIDE 29.8 MG/ML
20 INJECTION INTRAVENOUS
Status: DISCONTINUED | OUTPATIENT
Start: 2020-01-07 | End: 2020-01-08

## 2020-01-07 RX ORDER — POTASSIUM CHLORIDE 1500 MG/1
20-40 TABLET, EXTENDED RELEASE ORAL
Status: DISCONTINUED | OUTPATIENT
Start: 2020-01-07 | End: 2020-01-08

## 2020-01-07 RX ADMIN — FOLIC ACID 1 MG: 1 TABLET ORAL at 16:25

## 2020-01-07 RX ADMIN — LORAZEPAM 1 MG: 2 INJECTION INTRAMUSCULAR; INTRAVENOUS at 07:54

## 2020-01-07 RX ADMIN — LACTULOSE 20 G: 20 SOLUTION ORAL at 22:21

## 2020-01-07 RX ADMIN — NICOTINE 1 PATCH: 21 PATCH, EXTENDED RELEASE TRANSDERMAL at 16:22

## 2020-01-07 RX ADMIN — MULTIPLE VITAMINS W/ MINERALS TAB 1 TABLET: TAB at 16:24

## 2020-01-07 RX ADMIN — LORAZEPAM 1 MG: 1 TABLET ORAL at 22:27

## 2020-01-07 RX ADMIN — SPIRONOLACTONE 25 MG: 25 TABLET ORAL at 16:25

## 2020-01-07 RX ADMIN — PROPRANOLOL HYDROCHLORIDE 10 MG: 10 TABLET ORAL at 22:21

## 2020-01-07 RX ADMIN — FUROSEMIDE 40 MG: 40 TABLET ORAL at 16:24

## 2020-01-07 RX ADMIN — Medication 1 MG: at 22:21

## 2020-01-07 RX ADMIN — FLUTICASONE FUROATE AND VILANTEROL TRIFENATATE 1 PUFF: 200; 25 POWDER RESPIRATORY (INHALATION) at 16:22

## 2020-01-07 RX ADMIN — LORAZEPAM 1 MG: 1 TABLET ORAL at 19:25

## 2020-01-07 RX ADMIN — MIRTAZAPINE 15 MG: 15 TABLET, FILM COATED ORAL at 22:22

## 2020-01-07 RX ADMIN — LORAZEPAM 1 MG: 2 INJECTION INTRAMUSCULAR; INTRAVENOUS at 09:24

## 2020-01-07 RX ADMIN — LORAZEPAM 2 MG: 2 INJECTION INTRAMUSCULAR; INTRAVENOUS at 11:39

## 2020-01-07 RX ADMIN — LIDOCAINE HYDROCHLORIDE 10 ML: 10 INJECTION, SOLUTION EPIDURAL; INFILTRATION; INTRACAUDAL; PERINEURAL at 15:34

## 2020-01-07 RX ADMIN — Medication 100 MG: at 16:25

## 2020-01-07 RX ADMIN — LORAZEPAM 1 MG: 1 TABLET ORAL at 15:05

## 2020-01-07 ASSESSMENT — ENCOUNTER SYMPTOMS
APPETITE CHANGE: 1
CONFUSION: 0
BLOOD IN STOOL: 0
FATIGUE: 1
SEIZURES: 0
DIARRHEA: 1
NAUSEA: 1
VOMITING: 0
WEAKNESS: 1
SHORTNESS OF BREATH: 1
TREMORS: 1

## 2020-01-07 ASSESSMENT — MIFFLIN-ST. JEOR: SCORE: 1560.1

## 2020-01-07 ASSESSMENT — ACTIVITIES OF DAILY LIVING (ADL)
ADLS_ACUITY_SCORE: 16
ADLS_ACUITY_SCORE: 18

## 2020-01-07 NOTE — PROGRESS NOTES
Attempted to see patient for GI Consult. Patient is currently in ultrasound for ultrasound guided paracentesis. Will do GI Consult tomorrow [facility]  [phone]

## 2020-01-07 NOTE — ED TRIAGE NOTES
etoh hx, last drank 3p yest, liver failure hx, paracentesis last 12/31 , not eating, tremors, not sleeping, chronic edema

## 2020-01-07 NOTE — CONSULTS
Psychiatry consult:  Patient seen; full note to follow    Continue MSSA protool with Ativan; consider valium if withdrawal symptoms are not well controlled however ativan is a safer option in the setting of hepatic impairment.     Remeron has been restarted for management of his depressive disorder.     Chemical dependency consult noted; The patient is agreeable to pursue residential CD treatment.     Please reconsult with psychiatry as needed.

## 2020-01-07 NOTE — ED PROVIDER NOTES
History     Chief Complaint:  Alcohol Problem    The history is provided by the patient.      Jim Richard is a 64 year old male, with complicated past medical history including alcoholism, alcohol withdrawal, anxiety, depression MI, hypertension, hyperlipidemia, liver failure per patient report amongst others as noted below, who presents alone via EMS for evaluation of alcohol problem. Patient reports that he normally drinks 0.75 L of vodka daily, but states he has his last drink yesterday at 1500. Since then, patient reports that he has had decreased appetite, tremors and increased generalized weakness and fatigue. He attempted to get out of bed this morning to use the restroom, but was unable to, thus called EMS.     En route, patient began to feel nauseous, so was given 4 mg IV Zofran.     Here, patient states that he has had some shortness of breath and diarrhea, but denies any black or bloody bowel movement. He also denies any chest pain, vomiting, history of alcohol withdrawal induced seizures, confusion, recent falls or ongoing pain. Patient reports that he lives in a house with a roommate and wants to stop drinking alcohol. Of note, patient had a paracentesis New Years Chel.     Allergies:  Amlodipine  Lisionpril     Medications:    Albuterol inhaler  Cymbalta  Breo Ellipta  Lasix  Remeron  Nicoderm  Protonix  Inderal Seroquel  Aldactone  Trazodone    Past Medical History:    Alcoholism  Anxiety  CAD  Depression  Esophageal varices with bleeding  Heart attack  Hepatomegaly  Hyperlipidemia  Hypertension  JANIS on CPAP  Peripheral vascular disease  Subdural hematoma  Spinal stenosis  Chronic pain  PAD  Tremor  Alcohol withdrawal  Alcoholic hepatitis without ascities    Past Surgical History:    Appendectomy   Bypass graft femoropopliteal  Colonoscopy x2  Endarterectomy femoral  EGD, combined x4  Hernia repair - abdominal   Laminectomy, fusion lumbar, three + level, combined  Tonsillectomy and  "adenoidectomy    Family History:    Mother - CAD  Father - substance abuse, lung cancer  Brother - substance abuse    Social History:  The patient was accompanied to the ED by EMS.  Smoking Status: Yes - current every day smoker  Smokeless Tobacco: No  Alcohol Use: Yes  Drug Use: No   Marital Status:   [4]     Review of Systems   Constitutional: Positive for appetite change and fatigue.   Respiratory: Positive for shortness of breath.    Cardiovascular: Negative for chest pain.   Gastrointestinal: Positive for diarrhea and nausea. Negative for blood in stool and vomiting.   Neurological: Positive for tremors and weakness. Negative for seizures.   Psychiatric/Behavioral: Negative for confusion.   All other systems reviewed and are negative.    Physical Exam     Patient Vitals for the past 24 hrs:   BP Temp Temp src Pulse Resp SpO2 Height Weight   01/07/20 1000 (!) 152/72 -- -- 93 -- -- -- --   01/07/20 0945 126/72 -- -- 99 -- -- -- --   01/07/20 0930 (!) 147/80 -- -- 102 -- 94 % -- --   01/07/20 0915 (!) 151/74 -- -- -- -- 93 % -- --   01/07/20 0830 -- -- -- -- -- 94 % -- --   01/07/20 0815 -- -- -- -- -- 95 % -- --   01/07/20 0809 -- 97.8  F (36.6  C) Temporal -- -- -- -- --   01/07/20 0748 (!) 156/78 -- -- 90 18 96 % 1.702 m (5' 7\") 81.6 kg (180 lb)       Physical Exam  Constitutional: Well developed, chronic ill, nontox appearance  Head: Atraumatic.   Mouth/Throat: Oropharynx is clear and moist.   Neck:  no stridor, no c spine tenderness  Eyes: no scleral icterus, EOMI, PERRL  Cardiovascular: RRR, 2+ bilat radial pulses  Pulmonary/Chest: nml resp effort, Clear BS bilat  Abdominal: ND, +BS, soft, +fluid wave, NT, no rebound or guarding   Ext: Warm, well perfused, no edema  Neurological: A&Ox3, tremulous, symmetric facies, moves ext x4  Skin: Skin is warm and dry.   Psychiatric: Behavior is normal. Thought content normal.   Nursing note and vitals reviewed.        Emergency Department Course "     Laboratory:  Laboratory findings were communicated with the patient who voiced understanding of the findings.    CBC: HGB 9.4 (L), PLT 85 (L) o/w WNL (WBC 4.8)  CMP: Carbon Dioxide 17 (L), Anion Gap 16 (H), Calcium 8.1 (L), Bilirubin total 4.0 (H), Albumin 2.7 (L), Alkphos 423 (H), Ast 162 (H) o/w WNL (Creatinine 1.01)  INR: 1.26 (H)  Ammonia: 53 (H)  Alcohol ethyl: 0.08 (H)     Interventions:  0754 Ativan 1 mg IV  0924 Ativan 1 mg IV  1139 Ativan 2 mg IV    Emergency Department Course:  Past medical records, nursing notes, and vitals reviewed.    (02)   I performed an exam of the patient as documented above. History obtained from patient and EMS personnel.     IV was inserted and blood was drawn for laboratory testing, results above.     (919)   I rechecked the patient and discussed the results of his workup thus far. Discussed plan of care and need for admission. Patient is agreeable to this.     (79)   I spoke with Dr. Calhoun of the Hospitalist service regarding patient's presentation, findings, and plan of care, who agrees to accept patient for further care, monitoring and evaluation.      Findings and plan explained to the Patient who consents to admission. Discussed the patient with Dr. Calhoun, who will admit the patient to a bed for further monitoring, evaluation, and treatment.    I personally reviewed the laboratory results with the Patient and answered all related questions prior to admission.     Impression & Plan     Medical Decision Makin y.o. M presenting with generalized weakness, tremulousness    DDx includes alcohol withdrawal, electrolyte abnormality, cirrhosis with associated ascites, intracranial hemorrhage.  No evidence of significant extremity, intrathoracic or spinal injury on full primary and secondary trauma surveys.  Patient was given lorazepam with improvement in symptoms and tremulousness given his presentation is most consistent with alcohol withdrawal.  He does not  appear acutely encephalopathic or delirious.  He was subsequently admitted to the hospitalist service for further evaluation management including possible nonemergent paracentesis.  Doubt spontaneous bacterial peritonitis given exam and history.  Patient counseled results, diagnosis and disposition prior to admission.  He is understanding and agreeable to plan.    Diagnosis:    ICD-10-CM    1. Alcohol withdrawal syndrome without complication (H) F10.230    2. Alcoholic cirrhosis of liver with ascites (H) K70.31        Disposition:  Admitted.    Scribe Disclosure:  Bekah VALENTE, am serving as a scribe at 7:48 AM on 1/7/2020 to document services personally performed by Mauricio Mac MD based on my observations and the provider's statements to me.   1/7/2020    EMERGENCY DEPARTMENT       Mauricio Mac MD  01/08/20 0047

## 2020-01-07 NOTE — PLAN OF CARE
DATE & TIME: 1/07/2020 ED admit from 2 pm.    Cognitive Concerns/ Orientation : A & O x3-4, disorientated to situation. Forgetful.  BEHAVIOR & AGGRESSION TOOL COLOR: Yellow, intoxicated   CIWA SCORE: 8, 1 mg PO ativan given.   ABNL VS/O2: VSS on RA   MOBILITY: A2, stand to pivot.   PAIN MANAGMENT: Denies  DIET: Regular  BOWEL/BLADDER: BM in ED, pt states he is having loose BMs. Takes lactulose  ABNL LAB/BG: INR 1.26, Platelets 85, Hgb 9.4, and Liver enzymes elevated.   DRAIN/DEVICES: PIV SL  TELEMETRY RHYTHM: Was not placed before tx back down to US.   SKIN: Jaundice, and edema. Yellowing of sclarea. Bruised.   TESTS/PROCEDURES: Paracentesis w/ 4300 cc removed 1/07/2020.   D/C DAY/GOALS/PLACE: Pending  OTHER IMPORTANT INFO: GI, Psych and CD consulted.

## 2020-01-07 NOTE — CONSULTS
Psychiatry consult:  Patient is off the unit for an ultrasound.    Please page me with any acute concerns. Consult team will follow up tomorrow morning.

## 2020-01-07 NOTE — PROGRESS NOTES
RADIOLOGY PROCEDURE NOTE  Patient name: Jim Richard  MRN: [id number]  : 1955    Pre-procedure diagnosis: Ascites  Post-procedure diagnosis: Same    Procedure Date/Time: 2020  3:40 PM  Procedure: Paracentesis  Estimated blood loss: None  Specimen(s) collected with description: Ascites.  The patient tolerated the procedure well with no immediate complications.    See imaging dictation for procedural details and findings.    Provider name: Sebastien Pereira MD  Assistant(s):None

## 2020-01-07 NOTE — PHARMACY-ADMISSION MEDICATION HISTORY
Pharmacy Medication History  Admission medication history interview status for the 1/7/2020  admission is complete. See EPIC admission navigator for prior to admission medications     Medication history sources: Patient, Surescripts and Care Everywhere  Medication history source reliability: Moderate  Adherence assessment: Poor - Patient hasn't filled most medications since 10/31/19 and indicates he hasn't taken his medications since around [place] (except Breo which he uses daily). He states he has multiple pills at home, so noncompliant history.    Significant changes made to the medication list:  - Added acamprosate, hydroxyzine, and tamsulosin (Flomax was d/c'd at last admission, but patient says he's still taking).  - Patient unsure if he's taking pantoprazole, but kept on list as filled 10/31/19 and was told to take on discharge.    Additional medication history information:   - Patient states after he takes quetiapine, he feels itchy all over and like he needs to move around.  - Patient thinks he takes spironolactone 25 mg/d (written for 50 mg/d)  - Patient thinks he takes propranolol 10 mg daily (written for 10 mg BID)    Medication reconciliation completed by provider prior to medication history? No    Time spent in this activity: 20 minutes      Prior to Admission medications    Medication Sig Last Dose Taking? Auth Provider   acamprosate (CAMPRAL) 333 MG EC tablet Take 666 mg by mouth 3 times daily Past Month at Unknown time Yes Unknown, Entered By History   albuterol (PROAIR HFA/PROVENTIL HFA/VENTOLIN HFA) 108 (90 BASE) MCG/ACT Inhaler Inhale 2 puffs into the lungs every 6 hours as needed  prn Yes Unknown, Entered By History   DULoxetine (CYMBALTA) 60 MG capsule Take 1 capsule (60 mg) by mouth daily Past Month at Unknown time Yes Tiff Rodriguez MD   fluticasone-vilanterol (BREO ELLIPTA) 200-25 MCG/INH oral inhaler Inhale 1 puff into the lungs daily  1/6/2020 at Unknown time Yes Unknown, Entered By  History   folic acid (FOLVITE) 1 MG tablet Take 1 tablet (1 mg) by mouth daily Past Month at Unknown time Yes Tiff Rodriguez MD   furosemide (LASIX) 40 MG tablet Take 1 tablet (40 mg) by mouth daily Past Month at Unknown time Yes Tiff Rodriguez MD   hydrOXYzine (VISTARIL) 50 MG capsule Take 50 mg by mouth 3 times daily as needed for itching prn Yes Unknown, Entered By History   lactulose (CHRONULAC) 10 GM/15ML solution Take 20 g by mouth 2 times daily Past Month at Unknown time Yes Unknown, Entered By History   mirtazapine (REMERON) 15 MG tablet Take 1 tablet (15 mg) by mouth At Bedtime Past Month at Unknown time Yes Tiff Rodriguez MD   multivitamin, therapeutic with minerals (THERA-VIT-M) TABS tablet Take 1 tablet by mouth daily Past Month at Unknown time Yes Jude Duarte,    nicotine (NICODERM CQ) 21 MG/24HR 24 hr patch Place 1 patch onto the skin daily 1/6/2020 at Unsure if has one on Yes Elena Chiang MD   propranolol (INDERAL) 10 MG tablet Take 1 tablet (10 mg) by mouth 2 times daily  Patient taking differently: Take 10 mg by mouth 2 times daily Patient thinks he may be taking once daily but unsure Past Month at Unknown time Yes Tiff Rodriguez MD   QUEtiapine (SEROQUEL) 50 MG tablet Take 1 tablet (50 mg) by mouth At Bedtime Past Month at Unknown time Yes Tiff Rodriguez MD   spironolactone (ALDACTONE) 25 MG tablet Take 2 tablets (50 mg) by mouth daily  Patient taking differently: Take 25 mg by mouth daily Rx written 50mg daily, but he believes he's taking 1/day Past Month at Unknown time Yes Elena Chiang MD   tamsulosin (FLOMAX) 0.4 MG capsule Take 0.4 mg by mouth daily Was discontinued at last admission in 10/2019, but still taking per patient. Past Month at Unknown time Yes Unknown, Entered By History   traZODone (DESYREL) 100 MG tablet Take 2 tablets (200 mg) by mouth At Bedtime Past Month at Unknown time Yes Tiff Rodriguez MD   vitamin B1 (THIAMINE) 100 MG tablet Take 1 tablet (100 mg) by mouth  daily Past Month at Unknown time Yes Tiff Rodriguez MD   order for DME Equipment being ordered: Walker Wheels () and Walker ()  Treatment Diagnosis: difficulty walking   Donell Lucas MD   pantoprazole (PROTONIX) 40 MG EC tablet Take 1 tablet (40 mg) by mouth every morning (before breakfast)   Tiff Rodriguez MD

## 2020-01-07 NOTE — ED NOTES
Bed: ED19  Expected date:   Expected time:   Means of arrival:   Comments:  [facility] [place] [facility] - 64M ETOH withdrawal - ETA 8min

## 2020-01-07 NOTE — ED NOTES
Pt up to commode, assist of one, for BM. Pt cleaned up and returned to bed. Monitors reattached and call light within reach

## 2020-01-07 NOTE — H&P
[facility]    History and Physical - Hospitalist Service       Date of Admission:  1/7/2020    Assessment & Plan   Jim Richard is a 64 year old male who with past medical history of depression, anxiety, hypertension, COPD, sleep apnea on CPAP, alcoholism, alcoholic liver disease/cirrhosis with ascites who is being admitted for alcohol withdrawal.    Alcohol withdrawal  History of alcohol dependence/alcoholism  Endorses drinking 0.75L Vodka/day in the last 2 weeks. Last drink 3pm on 1/6/19. ETOH level in the ED 0.08.  Has tremors on exam. Per chart review, had been in hospital for withdrawal with DT in 10/2019. No known withdrawal seizures.  He wants to quit drinking alcohol.  -CIWA protocol with lorazepam due to elevated bilirubin  -Multivitamin, Folic Acid  - psych and CD consult     Alcoholic liver disease/cirrhosis, likely superimposed alcoholic hepatitis  Ascites  History of esophageal varices  Paracentesis was performed on twelve 31/2019 with removal of 5 L.  His laboratory shows bilirubin of 4, , ALT 43.  Ammonia is 53.  INR is 1.26.  -Ordered therapeutic paracentesis, albumin to be given for fluid removal 5 L or greater  -Continue with Aldactone and Lasix  -Continue with lactulose  -GI consult  -Continue with PPI    Depression/anxiety  - has not taken any meds for ~2 weeks.   - resumed Remeron, will consult psych for futher guidance in restarting/adjusting meds as indicated    Hypertension  - will continue Inderal, spironolactone and Lasix as above  - monitor blood pressure and renal function    Obstructive sleep apnea with CPAP  - CPAP per home setting    COPD  - continue prior to admission inhalers    Tobacco use/depdence;  - smokes 1ppd, counseled cessation. Nicotine patch while here       Diet: Regular diet  DVT Prophylaxis: Pneumatic Compression Devices, ambulation  Leger Catheter: not present  Code Status: DNR/DNI, discussed with patient on admission    Disposition Plan    Expected discharge: 2 - 3 days, recommended to unclear, TCU vs prior living arrangement once once alcohol withdrawal resolved, stable liver function.  Entered: Chely Calhoun MD 01/07/2020, 9:51 AM     The patient's care was discussed with the Patient.    Chely Calhoun MD  [facility]    ______________________________________________________________________    Chief Complaint   Generalized weakness  Alcohol withdrawal    History is obtained from the patient, chart review and discussion with ED physician    History of Present Illness   Jim iRchard is a 64 year old male who with past medical history of depression, anxiety, hypertension, COPD, sleep apnea on CPAP, alcoholism, alcoholic liver disease/cirrhosis with ascites who presents due to generalized weakness and alcohol withdrawal.    Patient reports he has been drinking 0.75L vodka per day since Christmas.  He last drank yesterday around 3 PM.  He noted significant tremors and he was quite too weak to even walk 10 feet in his home which prompted him to activate EMS.  He denies nausea or vomiting.  He does endorse loose stools mainly brown, denies melena or hematochezia.  He endorses chronic abdominal discomfort due to his ascites and feels that his ascites has gotten bigger since his last paracentesis 1 week ago.  He denies fever, but endorses chills.  He denies chest pain, but feels short of breath.  He denies suicidal thoughts.  He reports he has not taken any of his medications in the last 2 weeks.  He also states he was supposed to follow-up with gastroenterology about 2 weeks ago which he did not make it.  Patient reports that he wants to quit drinking.    In the ED, he is afebrile with temperature of 97.8, initial blood pressure of 156/78 and saturating 94-96% on room air.  Laboratories notable for total bilirubin of 4, alk phos of 423, ALT 43, .  Ammonia 53, hemoglobin 9.4.  INR 1.26, alcohol level  0.08.      Review of Systems    The 10 point Review of Systems is negative other than noted in the HPI    Past Medical History    I have reviewed this patient's medical history and updated it with pertinent information if needed.   Past Medical History:   Diagnosis Date     Alcoholism (H) 1/14/2013    had treatment at [facility]     Anxiety      Coronary artery disease 09/09/2014    Nuclear stress test with slight fixed defect inferiorly, no ischemia     Depression     w/anxiety     Esophageal varices with bleeding 04/28/2018    6 varices banded on EGD     Heart attack (H)      Hepatomegaly     Fatty liver, chronic alcoholic     Hyperlipemia      Hypertension      JANIS on CPAP      Peripheral vascular disease (H)      PVD (peripheral vascular disease) (H)      SDH (subdural hematoma) (H) 04/26/2018    Right side, resolved spontaneously     Spinal stenosis        Past Surgical History   I have reviewed this patient's surgical history and updated it with pertinent information if needed.  Past Surgical History:   Procedure Laterality Date     APPENDECTOMY       BYPASS GRAFT FEMOROPOPLITEAL  6/12/2012    Procedure: BYPASS GRAFT FEMOROPOPLITEAL;  LEFT FEMORAL TO ABOVE KNEE POPLITEAL BYPASS, ENDARTERECTOMY OF SFA AND PF ARTERIES, LEFT EXTERNAL ILIAC TO COMMON FEMORAL INTERPOSITION GRAFT;  Surgeon: Damir Roberts MD;  Location:  OR     COLONOSCOPY       COLONOSCOPY N/A 8/8/2019    Procedure: COLONOSCOPY;  Surgeon: Pavan Arguello MD;  Location:  GI     ENDARTERECTOMY FEMORAL  6/12/2012    Procedure: ENDARTERECTOMY FEMORAL;;  Surgeon: Damir Roberts MD;  Location:  OR     ESOPHAGOSCOPY, GASTROSCOPY, DUODENOSCOPY (EGD), COMBINED N/A 3/22/2018    Procedure: COMBINED ESOPHAGOSCOPY, GASTROSCOPY, DUODENOSCOPY (EGD);  ESOPHAGOSCOPY, GASTROSCOPY, DUODENOSOCPY.;  Surgeon: Valeri Pruitt MD;  Location:  OR     ESOPHAGOSCOPY, GASTROSCOPY, DUODENOSCOPY (EGD), COMBINED N/A 9/29/2018    Procedure: COMBINED  ESOPHAGOSCOPY, GASTROSCOPY, DUODENOSCOPY (EGD);;  Surgeon: Rosendo Shaw MD;  Location:  GI     ESOPHAGOSCOPY, GASTROSCOPY, DUODENOSCOPY (EGD), COMBINED N/A 8/2/2019    Procedure: ESOPHAGOGASTRODUODENOSCOPY (EGD);  Surgeon: Pavan Arguello MD;  Location:  GI     ESOPHAGOSCOPY, GASTROSCOPY, DUODENOSCOPY (EGD), COMBINED N/A 9/25/2019    Procedure: ESOPHAGOGASTRODUODENOSCOPY (EGD);  Surgeon: Pavan Arguello MD;  Location:  GI     HERNIA REPAIR  2017    Abdominal     LAMINECTOMY, FUSION LUMBAR THREE+ LEVEL, COMBINED N/A 9/22/2014    Procedure: COMBINED LAMINECTOMY, FUSION LUMBAR THREE+ LEVEL;  Surgeon: Sebastien Pruitt MD;  Location:  OR     TONSILLECTOMY & ADENOIDECTOMY         Social History   I have reviewed this patient's social history and updated it with pertinent information if needed.  Social History     Tobacco Use     Smoking status: Current Every Day Smoker     Packs/day: 1.00     Types: Cigarettes     Smokeless tobacco: Never Used     Tobacco comment: Chantix caused nightmares   Substance Use Topics     Alcohol use: Yes     Comment: Last drank Yesterday drinks 750 ml of vodka daily     Drug use: No       Family History   I have reviewed this patient's family history and updated it with pertinent information if needed.   Family History   Problem Relation Age of Onset     Mental Illness Son      Coronary Artery Disease Early Onset Mother      Substance Abuse Father      Lung Cancer Father      Substance Abuse Brother        Prior to Admission Medications   Prior to Admission Medications   Prescriptions Last Dose Informant Patient Reported? Taking?   DULoxetine (CYMBALTA) 60 MG capsule   No No   Sig: Take 1 capsule (60 mg) by mouth daily   QUEtiapine (SEROQUEL) 50 MG tablet   No No   Sig: Take 1 tablet (50 mg) by mouth At Bedtime   albuterol (PROAIR HFA/PROVENTIL HFA/VENTOLIN HFA) 108 (90 BASE) MCG/ACT Inhaler  Self Yes No   Sig: Inhale 2 puffs into the lungs every 6 hours as needed     fluticasone-vilanterol (BREO ELLIPTA) 200-25 MCG/INH oral inhaler  Self Yes No   Sig: Inhale 1 puff into the lungs daily    folic acid (FOLVITE) 1 MG tablet   No No   Sig: Take 1 tablet (1 mg) by mouth daily   furosemide (LASIX) 40 MG tablet   No No   Sig: Take 1 tablet (40 mg) by mouth daily   lactulose (CHRONULAC) 10 GM/15ML solution   No No   Sig: Take 30 mLs (20 g) by mouth 2 times daily   mirtazapine (REMERON) 15 MG tablet   No No   Sig: Take 1 tablet (15 mg) by mouth At Bedtime   multivitamin, therapeutic with minerals (THERA-VIT-M) TABS tablet  Self No No   Sig: Take 1 tablet by mouth daily   nicotine (NICODERM CQ) 21 MG/24HR 24 hr patch  Self No No   Sig: Place 1 patch onto the skin daily   order for DME  Self No No   Sig: Equipment being ordered: Walker Wheels () and Walker ()  Treatment Diagnosis: difficulty walking   pantoprazole (PROTONIX) 40 MG EC tablet   No No   Sig: Take 1 tablet (40 mg) by mouth every morning (before breakfast)   propranolol (INDERAL) 10 MG tablet   No No   Sig: Take 1 tablet (10 mg) by mouth 2 times daily   spironolactone (ALDACTONE) 25 MG tablet  Self No No   Sig: Take 2 tablets (50 mg) by mouth daily   traZODone (DESYREL) 100 MG tablet   No No   Sig: Take 2 tablets (200 mg) by mouth At Bedtime   vitamin B1 (THIAMINE) 100 MG tablet   No No   Sig: Take 1 tablet (100 mg) by mouth daily      Facility-Administered Medications: None     Allergies   Allergies   Allergen Reactions     Amlodipine Swelling     Lisinopril      Other reaction(s): Angioedema  Mouth and tongue swelling   Mouth and tongue swelling          Physical Exam   Vital Signs: Temp: 97.8  F (36.6  C) Temp src: Temporal BP: (!) 156/78 Pulse: 90   Resp: 18 SpO2: 94 %      Weight: 180 lbs 0 oz    General Appearance: Alert, awake, no apparent distress  HEENT: NCAT, oral mucosa is moist, no pharyngeal erythema or exudates, jaundice  Respiratory: Clear to auscultation bilaterally, no wheezing  Cardiovascular:  Regular rate and rhythm, bilateral lower extremity edema  GI: Soft, distended with fluid shift, nontender  Skin: Warm and dry  Musculoskeletal: Normal muscle tone, significant tremors in bilateral upper extremities  Neurologic: Alert and oriented, moving all extremities   Psychiatric: Mood and affect appear normal    Data   Data reviewed today: I reviewed all medications, new labs and imaging results over the last 24 hours. I personally reviewed no images or EKG's today.    Recent Labs   Lab 01/07/20  0750 12/31/19  1146   WBC 4.8 5.5   HGB 9.4* 9.9*   MCV 95 96   PLT 85* 205   INR 1.26* 1.24*    141   POTASSIUM 3.7 3.7   CHLORIDE 103 110*   CO2 17* 23   BUN 26 22   CR 1.01 0.88   ANIONGAP 16* 8   KEV 8.1* 7.9*   GLC 72 84   ALBUMIN 2.7* 2.6*   PROTTOTAL 6.9 6.8   BILITOTAL 4.0* 0.8   ALKPHOS 423* 304*   ALT 43 30   * 91*     No results found for this or any previous visit (from the past 24 hour(s)).

## 2020-01-07 NOTE — ED NOTES
"[facility]  ED Nurse Handoff Report    ED Chief complaint: Alcohol Problem      ED Diagnosis:   Final diagnoses:   Alcohol withdrawal syndrome without complication (H)   Alcoholic cirrhosis of liver with ascites (H)       Code Status: not addressed     Allergies:   Allergies   Allergen Reactions     Amlodipine Swelling     Lisinopril      Other reaction(s): Angioedema  Mouth and tongue swelling   Mouth and tongue swelling          Patient Story: lives in private home, here 3 times in Dec and a couple times in previos months for liver failure, asities, weakness, etoh withdrawal    Focused Assessment:  Tremors,  etoh .08, chronic LE edema and ascities     Treatments and/or interventions provided: ativan   Patient's response to treatments and/or interventions: denies relief     To be done/followed up on inpatient unit:  unknown     Does this patient have any cognitive concerns?: Alcohol/Drugs temporary cognitive concerns    Activity level - Baseline/Home:  Walker  Activity Level - Current:   Unknown    Patient's Preferred language: English   Needed?: No    Isolation: None  Infection: Not Applicable  Bariatric?: No    Vital Signs:   Vitals:    01/07/20 0748 01/07/20 0809 01/07/20 0815 01/07/20 0830   BP: (!) 156/78      Pulse: 90      Resp: 18      Temp:  97.8  F (36.6  C)     TempSrc:  Temporal     SpO2: 96%  95% 94%   Weight: 81.6 kg (180 lb)      Height: 1.702 m (5' 7\")          Cardiac Rhythm:     Was the PSS-3 completed:   Yes  What interventions are required if any?               Family Comments: none  OBS brochure/video discussed/provided to patient/family: No              Name of person given brochure if not patient: na              Relationship to patient: na    For the majority of the shift this patient's behavior was Green.   Behavioral interventions performed were na.    ED NURSE PHONE NUMBER: [phone]       "

## 2020-01-07 NOTE — PROGRESS NOTES
Paracentesis: Pt tolerated well. VSS. 4300 cc light re fluid removed from abdomen w/o difficulty. Dermabond applied toright abdomen puncture site - CDI. NO Albumin given per protocol.     1603 Pt back to rm 603-1 per cart & transport. Detailed report called to Lauren Meese, RN.

## 2020-01-08 ENCOUNTER — APPOINTMENT (OUTPATIENT)
Dept: OCCUPATIONAL THERAPY | Facility: CLINIC | Age: 65
DRG: 897 | End: 2020-01-08
Attending: INTERNAL MEDICINE
Payer: MEDICARE

## 2020-01-08 ENCOUNTER — APPOINTMENT (OUTPATIENT)
Dept: PHYSICAL THERAPY | Facility: CLINIC | Age: 65
DRG: 897 | End: 2020-01-08
Attending: INTERNAL MEDICINE
Payer: MEDICARE

## 2020-01-08 LAB
ALBUMIN SERPL-MCNC: 2.3 G/DL (ref 3.4–5)
ALP SERPL-CCNC: 359 U/L (ref 40–150)
ALT SERPL W P-5'-P-CCNC: 38 U/L (ref 0–70)
ANION GAP SERPL CALCULATED.3IONS-SCNC: 5 MMOL/L (ref 3–14)
AST SERPL W P-5'-P-CCNC: 111 U/L (ref 0–45)
BILIRUB SERPL-MCNC: 2.7 MG/DL (ref 0.2–1.3)
BUN SERPL-MCNC: 21 MG/DL (ref 7–30)
CALCIUM SERPL-MCNC: 8.2 MG/DL (ref 8.5–10.1)
CHLORIDE SERPL-SCNC: 100 MMOL/L (ref 94–109)
CO2 SERPL-SCNC: 29 MMOL/L (ref 20–32)
CREAT SERPL-MCNC: 1.09 MG/DL (ref 0.66–1.25)
ERYTHROCYTE [DISTWIDTH] IN BLOOD BY AUTOMATED COUNT: 15.3 % (ref 10–15)
GFR SERPL CREATININE-BSD FRML MDRD: 70 ML/MIN/{1.73_M2}
GLUCOSE SERPL-MCNC: 110 MG/DL (ref 70–99)
HCT VFR BLD AUTO: 27.4 % (ref 40–53)
HGB BLD-MCNC: 9.3 G/DL (ref 13.3–17.7)
MAGNESIUM SERPL-MCNC: 1.4 MG/DL (ref 1.6–2.3)
MAGNESIUM SERPL-MCNC: 2.3 MG/DL (ref 1.6–2.3)
MAGNESIUM SERPL-MCNC: NORMAL MG/DL (ref 1.6–2.3)
MCH RBC QN AUTO: 32.4 PG (ref 26.5–33)
MCHC RBC AUTO-ENTMCNC: 33.9 G/DL (ref 31.5–36.5)
MCV RBC AUTO: 96 FL (ref 78–100)
PHOSPHATE SERPL-MCNC: 2.2 MG/DL (ref 2.5–4.5)
PHOSPHATE SERPL-MCNC: NORMAL MG/DL (ref 2.5–4.5)
PLATELET # BLD AUTO: 58 10E9/L (ref 150–450)
POTASSIUM SERPL-SCNC: 3.2 MMOL/L (ref 3.4–5.3)
PROT SERPL-MCNC: 6 G/DL (ref 6.8–8.8)
RBC # BLD AUTO: 2.87 10E12/L (ref 4.4–5.9)
SODIUM SERPL-SCNC: 134 MMOL/L (ref 133–144)
WBC # BLD AUTO: 3.9 10E9/L (ref 4–11)

## 2020-01-08 PROCEDURE — 40000275 ZZH STATISTIC RCP TIME EA 10 MIN

## 2020-01-08 PROCEDURE — 25000132 ZZH RX MED GY IP 250 OP 250 PS 637: Mod: GY | Performed by: INTERNAL MEDICINE

## 2020-01-08 PROCEDURE — 85027 COMPLETE CBC AUTOMATED: CPT | Performed by: INTERNAL MEDICINE

## 2020-01-08 PROCEDURE — 84100 ASSAY OF PHOSPHORUS: CPT | Performed by: INTERNAL MEDICINE

## 2020-01-08 PROCEDURE — 83735 ASSAY OF MAGNESIUM: CPT | Performed by: INTERNAL MEDICINE

## 2020-01-08 PROCEDURE — 99232 SBSQ HOSP IP/OBS MODERATE 35: CPT | Performed by: INTERNAL MEDICINE

## 2020-01-08 PROCEDURE — 97530 THERAPEUTIC ACTIVITIES: CPT | Mod: GP

## 2020-01-08 PROCEDURE — 97530 THERAPEUTIC ACTIVITIES: CPT | Mod: GO

## 2020-01-08 PROCEDURE — 12000000 ZZH R&B MED SURG/OB

## 2020-01-08 PROCEDURE — 97165 OT EVAL LOW COMPLEX 30 MIN: CPT | Mod: GO

## 2020-01-08 PROCEDURE — 93010 ELECTROCARDIOGRAM REPORT: CPT | Performed by: INTERNAL MEDICINE

## 2020-01-08 PROCEDURE — 97161 PT EVAL LOW COMPLEX 20 MIN: CPT | Mod: GP

## 2020-01-08 PROCEDURE — 25000128 H RX IP 250 OP 636: Performed by: INTERNAL MEDICINE

## 2020-01-08 PROCEDURE — 80053 COMPREHEN METABOLIC PANEL: CPT | Performed by: INTERNAL MEDICINE

## 2020-01-08 PROCEDURE — 93005 ELECTROCARDIOGRAM TRACING: CPT

## 2020-01-08 PROCEDURE — 36415 COLL VENOUS BLD VENIPUNCTURE: CPT | Performed by: INTERNAL MEDICINE

## 2020-01-08 RX ORDER — HALOPERIDOL 5 MG/ML
1-2 INJECTION INTRAMUSCULAR EVERY 6 HOURS PRN
Status: DISCONTINUED | OUTPATIENT
Start: 2020-01-08 | End: 2020-01-16 | Stop reason: HOSPADM

## 2020-01-08 RX ORDER — BENZTROPINE MESYLATE 1 MG/1
1-2 TABLET ORAL 3 TIMES DAILY PRN
Status: DISCONTINUED | OUTPATIENT
Start: 2020-01-08 | End: 2020-01-16 | Stop reason: HOSPADM

## 2020-01-08 RX ORDER — ALBUMIN (HUMAN) 12.5 G/50ML
12.5 SOLUTION INTRAVENOUS ONCE
Status: DISCONTINUED | OUTPATIENT
Start: 2020-01-08 | End: 2020-01-08

## 2020-01-08 RX ORDER — TRAZODONE HYDROCHLORIDE 50 MG/1
50 TABLET, FILM COATED ORAL AT BEDTIME
Status: DISCONTINUED | OUTPATIENT
Start: 2020-01-08 | End: 2020-01-10

## 2020-01-08 RX ADMIN — LORAZEPAM 2 MG: 1 TABLET ORAL at 03:40

## 2020-01-08 RX ADMIN — HALOPERIDOL LACTATE 1 MG: 5 INJECTION, SOLUTION INTRAMUSCULAR at 06:02

## 2020-01-08 RX ADMIN — PANTOPRAZOLE SODIUM 40 MG: 40 TABLET, DELAYED RELEASE ORAL at 08:53

## 2020-01-08 RX ADMIN — FLUTICASONE FUROATE AND VILANTEROL TRIFENATATE 1 PUFF: 200; 25 POWDER RESPIRATORY (INHALATION) at 08:57

## 2020-01-08 RX ADMIN — FUROSEMIDE 40 MG: 40 TABLET ORAL at 08:53

## 2020-01-08 RX ADMIN — MULTIPLE VITAMINS W/ MINERALS TAB 1 TABLET: TAB at 08:53

## 2020-01-08 RX ADMIN — LORAZEPAM 1 MG: 1 TABLET ORAL at 17:43

## 2020-01-08 RX ADMIN — LORAZEPAM 2 MG: 2 INJECTION INTRAMUSCULAR; INTRAVENOUS at 19:56

## 2020-01-08 RX ADMIN — LORAZEPAM 1 MG: 1 TABLET ORAL at 02:49

## 2020-01-08 RX ADMIN — LACTULOSE 20 G: 20 SOLUTION ORAL at 21:12

## 2020-01-08 RX ADMIN — MIRTAZAPINE 15 MG: 15 TABLET, FILM COATED ORAL at 21:12

## 2020-01-08 RX ADMIN — POTASSIUM PHOSPHATE, MONOBASIC 500 MG: 500 TABLET, SOLUBLE ORAL at 15:08

## 2020-01-08 RX ADMIN — LORAZEPAM 2 MG: 1 TABLET ORAL at 05:00

## 2020-01-08 RX ADMIN — LORAZEPAM 1 MG: 1 TABLET ORAL at 00:27

## 2020-01-08 RX ADMIN — FOLIC ACID 1 MG: 1 TABLET ORAL at 08:53

## 2020-01-08 RX ADMIN — POTASSIUM PHOSPHATE, MONOBASIC 500 MG: 500 TABLET, SOLUBLE ORAL at 21:11

## 2020-01-08 RX ADMIN — NICOTINE 1 PATCH: 21 PATCH, EXTENDED RELEASE TRANSDERMAL at 22:13

## 2020-01-08 RX ADMIN — SPIRONOLACTONE 25 MG: 25 TABLET ORAL at 08:53

## 2020-01-08 RX ADMIN — TRAZODONE HYDROCHLORIDE 50 MG: 50 TABLET ORAL at 21:12

## 2020-01-08 RX ADMIN — POTASSIUM PHOSPHATE, MONOBASIC 500 MG: 500 TABLET, SOLUBLE ORAL at 17:00

## 2020-01-08 RX ADMIN — LORAZEPAM 1 MG: 2 INJECTION INTRAMUSCULAR; INTRAVENOUS at 17:00

## 2020-01-08 RX ADMIN — LACTULOSE 20 G: 20 SOLUTION ORAL at 08:52

## 2020-01-08 RX ADMIN — HALOPERIDOL LACTATE 2 MG: 5 INJECTION, SOLUTION INTRAMUSCULAR at 18:11

## 2020-01-08 RX ADMIN — POTASSIUM CHLORIDE 20 MEQ: 1500 TABLET, EXTENDED RELEASE ORAL at 13:09

## 2020-01-08 RX ADMIN — NICOTINE 1 PATCH: 21 PATCH, EXTENDED RELEASE TRANSDERMAL at 08:52

## 2020-01-08 RX ADMIN — POTASSIUM CHLORIDE 40 MEQ: 1500 TABLET, EXTENDED RELEASE ORAL at 10:05

## 2020-01-08 RX ADMIN — MAGNESIUM SULFATE HEPTAHYDRATE 4 G: 40 INJECTION, SOLUTION INTRAVENOUS at 11:04

## 2020-01-08 RX ADMIN — LORAZEPAM 1 MG: 1 TABLET ORAL at 10:05

## 2020-01-08 RX ADMIN — Medication 100 MG: at 08:53

## 2020-01-08 RX ADMIN — PROPRANOLOL HYDROCHLORIDE 10 MG: 10 TABLET ORAL at 08:53

## 2020-01-08 ASSESSMENT — ACTIVITIES OF DAILY LIVING (ADL)
ADLS_ACUITY_SCORE: 20
ADLS_ACUITY_SCORE: 20
PREVIOUS_RESPONSIBILITIES: MEAL PREP;HOUSEKEEPING;LAUNDRY;SHOPPING;MEDICATION MANAGEMENT;FINANCES;DRIVING
ADLS_ACUITY_SCORE: 20
ADLS_ACUITY_SCORE: 21
ADLS_ACUITY_SCORE: 20
ADLS_ACUITY_SCORE: 18

## 2020-01-08 NOTE — PLAN OF CARE
OT orders rec'd, initial eval complete, treatment initiated. Pt is a 64 year old male who with past medical history of depression, anxiety, hypertension, COPD, sleep apnea on CPAP, alcoholism, alcoholic liver disease/cirrhosis with ascites who is being admitted for alcohol withdrawal. Pt lives w/roommate in a town home. Pt uses a FWW at baseline, otherwise is IND w/ADL's.     Discharge Planner OT   Patient plan for discharge: Not stated  Current status: Pt sup>sit EOB min A for shoulder support and stability. Pt exhibiting impaired dynamic seated balance. Pt sit to/from stand EOB x2 trails, min-mod A x2 w/FWW. Pt stood for ~ 5 secs x1 trail, then ~8 secs on second trial w/mod A x2 and FWW to take 1 step. Pt impulsive and requires max vc's for hand placement. Pt min A x1 for getting legs up to bed. Pt mod I to doff B socks, min A to don B socks. Pt w/BLE and BUE tremors throughout session.   Barriers to return to prior living situation: decreased strength, impaired balance , decreased activity tolerance  Recommendations for discharge: TCU  Rationale for recommendations: Pt will benefit from continued skilled OT services to increased strength and activity tolerance to increases safety w/functional transfers and ADL's.        Entered by: Shannan Fang 01/08/2020 9:02 AM

## 2020-01-08 NOTE — PLAN OF CARE
DATE & TIME: 1534-1019, 1/8/2020   Cognitive Concerns/ Orientation : Alert and oriented x 2    BEHAVIOR & AGGRESSION TOOL COLOR: Green   CIWA SCORE: 5, 9, 4- Due to tremors and agitation. Ativan given x1.   ABNL VS/O2: VSS on room air   MOBILITY: x 2 assist gait belt and walker to [facility].    PAIN MANAGMENT: Patient complains of pain RLQ of abdomen. Heat pack applied with relief.   DIET: Regular, encourage PO fluids   BOWEL/BLADDER: BS+ q4. Incontinent of bowel and bladder.   ABNL LAB/BG: K+ = 3.2 (Replaced orally, check in a.m.), Mag= 1.4 (replaced PIV, re-check at 1500 and in a.m.), Phosp= 2.2 (replacement via scheduled phosphorous). Ammonia 53 (trending down)  DRAIN/DEVICES: N/A  TELEMETRY RHYTHM: SR with BBB   SKIN: Dry, red, +2 edema in lower extremeties, multiple abrasions and bruises.  TESTS/PROCEDURES: N/A  D/C DAY/GOALS/PLACE: pending discharge.   OTHER IMPORTANT INFO: Patient impulsive and hallucinates at times. Psych, PT, OT, SW, CD following. Nursing will continue to monitor and assess.

## 2020-01-08 NOTE — CONSULTS
CLINICAL NUTRITION SERVICES  -  ASSESSMENT NOTE      Recommendations Ordered by Registered Dietitian (RD):   Continue regular diet  One strawberry milkshake + 2 packets beneprotein daily    MALNUTRITION:  % Weight Loss:  Weight loss does not meet criteria for malnutrition, hard to interpret due to fluid shifts and pt reported wt loss was given in an unknown period of time   % Intake:  <75% for > 7 days (non-severe malnutrition) -per pt  Subcutaneous Fat Loss:  Unable to assess   Muscle Loss:  Unable to assess   Fluid Retention:  Mild, related to ascites vs nutrition status     Malnutrition Diagnosis: Unable to determine due to lack of NFPE            REASON FOR ASSESSMENT  Jim Richard is a 64 year old male seen by Registered Dietitian for Admission Nutrition Risk Screen for unintentional loss of 10# or more in the past two months    Pt has a past medical hx of depression, anxiety, hypertension, COPD, sleep apnea on CPAP, alcoholism, alcoholic liver disease/cirrhosis with ascites who is being admitted for alcohol withdrawal.         NUTRITION HISTORY  Information obtained from chart:  - reports drinking 0.75L Vodka/day in the last 2 weeks, wants to quit drinking alcohol   Information obtained from pt:  - Poor appetite since drinking (last 2 weeks)   - Does not eat breakfast, is not a morning person  - Lunch and dinner are usually spaghetti, tuna, or hamburgers      CURRENT NUTRITION ORDERS  Diet Order:     Regular       Current Intake/Tolerance:  Information obtained from chart:  - Pt has eaten 100% of meals since admission, recorded once on 1/7/20  - Ordered grilled cheese, chicken soup, and diet cola  Information obtained from pt:  - appetite is poor and eating is not going well due to withdrawal      NUTRITION FOCUSED PHYSICAL ASSESSMENT FOR DIAGNOSING MALNUTRITION)  No:  Not appropriate given pt going through withdrawal                 Observed:    Unable to observe     Obtained from  "Chart/Interdisciplinary Team:  Paracentesis 1/8, drained 4.3L, 5L were drained in ED     ANTHROPOMETRICS  Height: 5' 7\"  Weight: 180 lbs 0 oz  Body mass index is 28.19 kg/m .  Weight Status:  Overweight BMI 25-29.9  IBW: 148 lbs  % IBW: 122%  Weight History:   Wt Readings from Last 10 Encounters:   01/07/20 81.6 kg (180 lb)   12/24/19 88.5 kg (195 lb)   11/19/19 77.1 kg (170 lb)   11/13/19 77.1 kg (170 lb)   10/30/19 77.8 kg (171 lb 9.6 oz)   10/01/19 75.8 kg (167 lb)   08/13/19 84 kg (185 lb 3 oz)   07/10/19 90.7 kg (200 lb)   07/03/19 90.7 kg (200 lb)   07/03/19 90.7 kg (200 lb)   Wts are hard to interpret due to fluid shifts   Pt reports a usually body weight of 190 lbs and losing 30 pounds in unknown period of time, unable to verify       LABS  Labs reviewed    MEDICATIONS  Medications reviewed  Currently on a multivitamin and folic acid 1 mg daily, thiamine 100 mg daily       ASSESSED NUTRITION NEEDS PER APPROVED PRACTICE GUIDELINES:    Dosing Weight 81.6 kg  Estimated Energy Needs: 2040 - 2448 kcals (25-30 Kcal/Kg)  Justification: maintenance  Estimated Protein Needs:  98 - 122 grams protein (1.2-1.5 g pro/Kg)  Justification: maintenance  Estimated Fluid Needs: 2040 - 2448 mL (25-30 mL/kg) or per MD  Justification: maintenance    MALNUTRITION:  % Weight Loss:  Weight loss does not meet criteria for malnutrition, hard to interpret due to fluid shifts and pt reported wt loss was given in an unknown period of time   % Intake:  <75% for > 7 days (non-severe malnutrition) -per pt  Subcutaneous Fat Loss:  Unable to assess   Muscle Loss:  Unable to assess   Fluid Retention:  Mild, related to ascites vs nutrition status     Malnutrition Diagnosis: Unable to determine due to lack of NFPE      NUTRITION DIAGNOSIS:  Inadequate oral intake related to decreased appetite with increased alcohol consumption as evidenced by pt reports poor appetite for at least 2 weeks        NUTRITION INTERVENTIONS  Recommendations / " Nutrition Prescription  Continue regular diet  One strawberry milkshake + 2 packets beneprotein daily      Implementation  Nutrition education: Not appropriate at this time due to patient condition  Medical Food Supplement: see above        Nutrition Goals  Consume greater than/ equal to 50% of meals TID + one milkshake daily         MONITORING AND EVALUATION:  Progress towards goals will be monitored and evaluated per protocol and Practice Guidelines      Chantelleesequiel Nunes  Dietetic Intern

## 2020-01-08 NOTE — PLAN OF CARE
Discharge Planner PT   Patient plan for discharge: Home  Current status: PT eval completed, and treatment indicted. Pt is a 64 yr old male with past medical history of depression, anxiety, hypertension, COPD, sleep apnea on CPAP, alcoholism, alcoholic liver disease/cirrhosis with ascites is admitted for alcohol withdrawal. Pt lives with a friend in a one sihra condo with 6 SHANON with rail support. Pt's laundry is in the basement. Friend does most of the cooking and cleaning at home. Pt reported being ind all ADL's and uses a RW for ambulation.   Pt was received supine in bed, awake, alert, sitter present at bs. Pt required mod assist x 1 with rolling to either side with verbal cues for leg placement and technique. Pt is at mod assist x 1 with verbal cues for supine>sit. Pt sat at EOB x 5 min with SBA-CGA for safety. Pt noted with tremors and rigidity. Pt stood with min assist x 2 and verbal cues for proper hand placement and technique. Pt took 5 side steps to the HOB using mod assist to manage RW, additional min assist x 1 and verbal cues. Pt was assisted back to bed with mod assist x 1 for BLE's to advance into bed.   Barriers to return to prior living situation: Fall risk, Impaired balance and strength, level of assistance.   Recommendations for discharge: TCU  Rationale for recommendations: Pt will need continued skilled PT at a TCU to achieve PLOF and independence.        Entered by: David Skinner 01/08/2020 2:45 PM

## 2020-01-08 NOTE — PROGRESS NOTES
01/08/20 1400   Quick Adds   Type of Visit Initial PT Evaluation   Living Environment   Lives With friend(s)   Living Arrangements condominium   Home Accessibility stairs within home;stairs to enter home   Number of Stairs, Main Entrance 6   Stair Railings, Main Entrance railing on right side (ascending)   Number of Stairs, Within Home, Primary other (see comments)  (12 to the basement)   Stair Railings, Within Home, Primary railing on right side (ascending)   Transportation Anticipated family or friend will provide   Living Environment Comment Pt lives with a friend in a [facility] with 6 SHANON with 1 rail support. Pt's to access the basement for laundry.   Self-Care   Usual Activity Tolerance good   Current Activity Tolerance fair   Regular Exercise No   Equipment Currently Used at Home walker, rolling   Activity/Exercise/Self-Care Comment Pt was ind with all ADL's and IADLs   Functional Level Prior   Ambulation 1-->assistive equipment   Transferring 0-->independent   Toileting 0-->independent   Bathing 0-->independent   Communication 0-->understands/communicates without difficulty   Swallowing 0-->swallows foods/liquids without difficulty   Cognition 1 - attention or memory deficits   Fall history within last six months yes   Number of times patient has fallen within last six months 1   Which of the above functional risks had a recent onset or change? ambulation   Prior Functional Level Comment Pt uses a RW for ambulation   General Information   Onset of Illness/Injury or Date of Surgery - Date 01/07/19   Referring Physician Chely Calhoun MD   Patient/Family Goals Statement Walk   Pertinent History of Current Problem (include personal factors and/or comorbidities that impact the POC) Pt is a 64 yr old male with past medical history of depression, anxiety, hypertension, COPD, sleep apnea on CPAP, alcoholism, alcoholic liver disease/cirrhosis with ascites is admitted for alcohol withdrawal.     Precautions/Limitations fall precautions   Weight-Bearing Status - LLE full weight-bearing   Weight-Bearing Status - RLE full weight-bearing   General Observations Cooperative   General Info Comments Activity: Ambulate   Cognitive Status Examination   Orientation person;place   Level of Consciousness alert   Follows Commands and Answers Questions 100% of the time;able to follow single-step instructions   Personal Safety and Judgment impaired;at risk behaviors demonstrated   Memory intact   Pain Assessment   Patient Currently in Pain No   Range of Motion (ROM)   ROM Comment BLE: WFL   Strength   Strength Comments BLE: grossly 4/5   Bed Mobility   Bed Mobility Comments Supine<>sit:mod assist x 1    Transfer Skills   Transfer Comments STS at min assist x 2    Gait   Gait Comments 5 side setps to the HOB with mod assist x 1 and min assist of another   Balance   Balance Comments Sitting: Fair   Sensory Examination   Sensory Perception no deficits were identified   Coordination   Coordination Comments Unable to assess due to tremors   Muscle Tone   Muscle Tone Comments Rigidity and tremors noted   General Therapy Interventions   Planned Therapy Interventions balance training;bed mobility training;gait training;strengthening;transfer training;risk factor education;home program guidelines;progressive activity/exercise   Clinical Impression   Criteria for Skilled Therapeutic Intervention yes, treatment indicated   PT Diagnosis Impaired gait   Influenced by the following impairments decreased strength, decreased activity tolerance   Functional limitations due to impairments assistance needed for safe mobility   Clinical Presentation Evolving/Changing   Clinical Presentation Rationale clinical judgement, [facility]   Clinical Decision Making (Complexity) Low complexity   Therapy Frequency Daily   Predicted Duration of Therapy Intervention (days/wks) 6   Anticipated Discharge Disposition Transitional Care Facility   Risk & Benefits  "of therapy have been explained Yes   Patient, Family & other staff in agreement with plan of care Yes   Clinical Impression Comments Pt presents with impaired strength, balance and activity tolerance limiting independence with fucntional mobility. Pt will benefit from continued skilled PT to achieve PLOF and independence.    [facility] AM-PAC  \"6 Clicks\" V.2 Basic Mobility Inpatient Short Form   1. Turning from your back to your side while in a flat bed without using bedrails? 3 - A Little   2. Moving from lying on your back to sitting on the side of a flat bed without using bedrails? 3 - A Little   3. Moving to and from a bed to a chair (including a wheelchair)? 2 - A Lot   4. Standing up from a chair using your arms (e.g., wheelchair, or bedside chair)? 3 - A Little   5. To walk in hospital room? 2 - A Lot   6. Climbing 3-5 steps with a railing? 2 - A Lot   Basic Mobility Raw Score (Score out of 24.Lower scores equate to lower levels of function) 15     "

## 2020-01-08 NOTE — PLAN OF CARE
DATE & TIME: 1/7/2020 9619-8871    Cognitive Concerns/ Orientation : A&O4, forgetful at times   BEHAVIOR & AGGRESSION TOOL COLOR: green  CIWA SCORE: 5, 8 (ativan), 4, 7 (ativan). Scoring for tremors and anxiety  ABNL VS/O2: tachycardic at times, /73, 160's. Room air  MOBILITY: up with 2 BG/W, very weak and tremorous  PAIN MANAGMENT: denies pain  DIET: regular  BOWEL/BLADDER: continent  ABNL LAB/BG: ammonia 53, started lactulose  DRAIN/DEVICES: PIV SL  TELEMETRY RHYTHM: ST with BBB  SKIN: deanne red on coccyx. Paracentesis puncture site on R abdomen, AGUILA. Scattered bruising. Nicolás LLE  TESTS/PROCEDURES: paracentesis today, drained 4.3L. 5L were drained in ED  D/C DAY/GOALS/PLACE: pending  OTHER IMPORTANT INFO: psych following, CD/GI/OT/PT consulted. Nicotine patch on L shoulder. Pt is requesting nicorette gum. BLE edema +2 on R side, +3 on L side.

## 2020-01-08 NOTE — PLAN OF CARE
DATE & TIME: 01/08/2020 7760-4897                        Cognitive Concerns/ Orientation : A&O x2-4; forgetful at times, increase in confusion throughout night  BEHAVIOR & AGGRESSION TOOL COLOR: green; impulsive  CIWA SCORE: 8, 7, 11, 14, 7 PRN Ativan given   ABNL VS/O2: VSS on RA  MOBILITY: up with 2 BG/W, very weak and tremorous  PAIN MANAGMENT: Complaint of LLQ pain, cold packs applied   DIET: regular  BOWEL/BLADDER: Incontinent of B/B. Loose stool x3, on Lactulose. Uses urinal occasionally. Does get up to commode.   ABNL LAB/BG: ammonia 53, started lactulose  DRAIN/DEVICES: PIV SL  TELEMETRY RHYTHM: ST with BBB  SKIN: blanchable reddness on coccyx. Paracentesis puncture site on R abdomen, AGUILA. Scattered bruising. Nicolás LLE  TESTS/PROCEDURES: paracentesis yesterday, drained 4.3L. 5L were drained in ED  D/C DAY/GOALS/PLACE: pending progress  OTHER IMPORTANT INFO: psych following, CD/GI/OT/PT consulted. Nicotine patch fell off of L shoulder. Pt is requesting nicorette gum. BLE edema +2 on R side, +3 on L side.    Update: Increase agitation started at approximately 0530. PRN Haloperidol ordered. 1 mg given x1 at 0605.   Delirium protocol added.

## 2020-01-08 NOTE — PROGRESS NOTES
01/08/20 0826   Quick Adds   Type of Visit Initial Occupational Therapy Evaluation   Living Environment   Lives With friend(s)   Living Arrangements other (see comments)  (town home)   Home Accessibility stairs to enter home   Number of Stairs, Main Entrance other (see comments)  (12)   Stair Railings, Main Entrance   (12)   Transportation Anticipated family or friend will provide   Functional Level   Ambulation 1-->assistive equipment   Transferring 1-->assistive equipment   Toileting 0-->independent   Bathing 0-->independent   Dressing 0-->independent   Eating 0-->independent   Communication 0-->understands/communicates without difficulty   Cognition 1 - attention or memory deficits   Fall history within last six months no   Which of the above functional risks had a recent onset or change? transferring;ambulation;dressing;bathing;toileting   Prior Functional Level Comment Pt used a fww at baseline for functional mobility, otherwise was IND w/ADL's.    General Information   Onset of Illness/Injury or Date of Surgery - Date 01/07/20   Referring Physician Chely Calhoun MD   Patient/Family Goals Statement Home   Additional Occupational Profile Info/Pertinent History of Current Problem Jim Richard is a 64 year old male who with past medical history of depression, anxiety, hypertension, COPD, sleep apnea on CPAP, alcoholism, alcoholic liver disease/cirrhosis with ascites who is being admitted for alcohol withdrawal.   Precautions/Limitations fall precautions   General Info Comments Pt unsteady w/STS, tremoulous in BUE and BLE   Cognitive Status Examination   Orientation person;place   Level of Consciousness lethargic/somnolent   Follows Commands (Cognition) 75-90% accuracy   Visual Perception   Visual Perception No deficits were identified   Sensory Examination   Sensory Quick Adds No deficits were identified   Pain Assessment   Patient Currently in Pain No   Integumentary/Edema   Integumentary/Edema  Comments BLE edema   Range of Motion (ROM)   ROM Comment BUE limited to ~160    Strength   Strength Comments 4/5 BUE   Hand Strength   Hand Strength Comments B  strength weak   Coordination   Upper Extremity Coordination Left UE impaired;Right UE impaired   Coordination Comments tremors in BUE    Transfer Skill: Sit to Stand   Level of [place]: Sit/Stand minimum assist (75% patients effort)   Physical Assist/Nonphysical Assist: Sit/Stand supervision;verbal cues;2 persons   Transfer Skill: Sit to Stand full weight-bearing   Assistive Device for Transfer: Sit/Stand rolling walker   Lower Body Dressing   Level of [place]: Dress Lower Body minimum assist (75% patients effort)   Physical Assist/Nonphysical Assist: Dress Lower Body 1 person assist   Instrumental Activities of Daily Living (IADL)   Previous Responsibilities meal prep;housekeeping;laundry;shopping;medication management;finances;driving   Activities of Daily Living Analysis   Impairments Contributing to Impaired Activities of Daily Living balance impaired;cognition impaired;coordination impaired;ROM decreased;strength decreased   General Therapy Interventions   Planned Therapy Interventions ADL retraining;IADL retraining;strengthening   Clinical Impression   Criteria for Skilled Therapeutic Interventions Met yes, treatment indicated   OT Diagnosis Decreased IND w/I/ADL, impaired functional transfes    Influenced by the following impairments Decreased strength, limited ROM in BUE, Cog?, impaired balance   Assessment of Occupational Performance 1-3 Performance Deficits   Identified Performance Deficits I/ADL, functional transfers   Clinical Decision Making (Complexity) Low complexity   Therapy Frequency Daily   Predicted Duration of Therapy Intervention (days/wks) 7   Anticipated Equipment Needs at Discharge bath sponge;reacher;sock aide;shower chair;raised toilet seat   Anticipated Discharge Disposition Transitional Care Facility   Risks and  "Benefits of Treatment have been explained. Yes   Patient, Family & other staff in agreement with plan of care Yes   Clinical Impression Comments Pt exhibits decreased strength, limited activity tolerance, and decreased ROM impairing ind w/ADL's.    [facility] TM \"6 Clicks\"   2016, Trustees of [facility], under license to [employer].  All rights reserved.   6 Clicks Short Forms Daily Activity Inpatient Short Form   [facility] AM-PAC  \"6 Clicks\" Daily Activity Inpatient Short Form   1. Putting on and taking off regular lower body clothing? 3 - A Little   2. Bathing (including washing, rinsing, drying)? 3 - A Little   3. Toileting, which includes using toilet, bedpan or urinal? 3 - A Little   4. Putting on and taking off regular upper body clothing? 3 - A Little   5. Taking care of personal grooming such as brushing teeth? 3 - A Little   6. Eating meals? 4 - None   Daily Activity Raw Score (Score out of 24.Lower scores equate to lower levels of function) 19   Total Evaluation Time   Total Evaluation Time (Minutes) 10     "

## 2020-01-08 NOTE — PROGRESS NOTES
MD Notification    Notified Person: MD    Notified Person Name: Dr. Vences    Notification Date/Time: 01/08/2020 @0520    Notification Interaction: Telephone    Purpose of Notification: Pt. Is having increased impulsiveness. PRN Ativan 2mg given with little results. Last CIWA score 15.     Orders Received: PRN Haloperidol. EKG prior to fist dose of Haloperidol. Delirium Tremens protocol.     Comments:

## 2020-01-08 NOTE — PROGRESS NOTES
[facility]    Hospitalist Progress Note      Assessment & Plan   Jim Richard is a 64 year old male who with past medical history of depression, anxiety, hypertension, COPD, sleep apnea on CPAP, alcoholism, alcoholic liver disease/cirrhosis with ascites who is being admitted for alcohol withdrawal.     Alcohol withdrawal  History of alcohol dependence/alcoholism  Endorses drinking 0.75L Vodka/day in the last 2 weeks. Last drink 3pm on 1/6/19. ETOH level in the ED 0.08.  Has tremors on exam. Per chart review, had been in hospital for withdrawal with DT in 10/2019. No known withdrawal seizures.  He wants to quit drinking alcohol.  -CIWA protocol with lorazepam due to elevated bilirubin  - Haldol prn availabe  -Multivitamin, Folic Acid  - appreciate psych consultation  - CD consult pending     Alcoholic liver disease/cirrhosis, likely superimposed mild alcoholic hepatitis  Ascites  History of esophageal varices  Paracentesis was performed on 12/ 31/2019 with removal of 5 L.  His laboratory shows bilirubin of 4, , ALT 43.  Ammonia is 53.  INR is 1.26. bilirubin down trended to 2.7 and AST to 111 on recheck.   - s/p paracentesis with removal of 4.3L on 1/7/20. Fluid analysis not suggestive of SBP  -Continue with Aldactone 25mg daily and Lasix 40mg daily, will increase in the next day if renal function remains stable  -Continue with lactulose  -GI consult appreicated  -Continue with PPI  - check CMP in AM     Depression/anxiety  - has not taken any meds for ~2 weeks.   - resumed Remeron, restarted Trazodone at lower dose of 50mg at bedtime (PTA 200mg at bedtime)    Hypertension  - will continue Inderal, spironolactone and Lasix as above  - monitor blood pressure and renal function     Obstructive sleep apnea with CPAP  - CPAP per home setting     COPD  - continue prior to admission inhalers     Tobacco use/depdence;  - smokes 1ppd, counseled cessation. Nicotine patch while  here    Hypokalemia  Hypophosphatemia  - Kphos 500mg TID x 3 doses  - recheck labs in AM     DVT Prophylaxis: Pneumatic Compression Devices  Code Status: DNR/DNI    Disposition: Expected discharge in 2-3 days once no longer withdrawing from ETOH.    Chely Calhoun MD  Text Page  (7am to 6pm)    Interval History   Had paracentesis yesterday.    Received haldol this morning as he was impulsive. Still receiving ativan for CIWA.   Tremors are better. But he is at times impulsive.   Denies nausea, vomiting, fever or chills.    -Data reviewed today: I reviewed all new labs and imaging results over the last 24 hours. I personally reviewed no images or EKG's today.    Physical Exam   Temp: 98.5  F (36.9  C) Temp src: Oral BP: 132/73 Pulse: 79   Resp: 18 SpO2: 98 % O2 Device: None (Room air)    Vitals:    01/07/20 0748   Weight: 81.6 kg (180 lb)     Vital Signs with Ranges  Temp:  [98.5  F (36.9  C)-99.2  F (37.3  C)] 98.5  F (36.9  C)  Pulse:  [68-98] 79  Resp:  [18-20] 18  BP: (132-166)/(60-82) 132/73  SpO2:  [93 %-98 %] 98 %  I/O last 3 completed shifts:  In: 360 [P.O.:360]  Out: 100 [Urine:100]    Constitutional: Alert, awake, oriented to place, initially got the month wrong and corrected himself quickly  Respiratory: Clear to auscultation bilaterally, no wheezing  Cardiovascular: regular rate and rhythm  GI: soft and non-tender  Skin/Integumen: warm and dry, some bruises on UE      Medications       fluticasone-vilanterol  1 puff Inhalation Daily     folic acid  1 mg Oral Daily     furosemide  40 mg Oral Daily     lactulose  20 g Oral BID     mirtazapine  15 mg Oral At Bedtime     multivitamin w/minerals  1 tablet Oral Daily     nicotine  1 patch Transdermal Daily     nicotine   Transdermal Q8H     pantoprazole  40 mg Oral QAM AC     propranolol  10 mg Oral BID     sodium chloride (PF)  3 mL Intracatheter Q8H     spironolactone  25 mg Oral Daily     vitamin B1  100 mg Oral Daily       Data   Recent Labs   Lab  01/08/20  0813 01/07/20  1746 01/07/20  0750   WBC 3.9*  --  4.8   HGB 9.3*  --  9.4*   MCV 96  --  95   PLT 58*  --  85*   INR  --   --  1.26*     --  136   POTASSIUM 3.2*  --  3.7   CHLORIDE 100  --  103   CO2 29  --  17*   BUN 21  --  26   CR 1.09  --  1.01   ANIONGAP 5  --  16*   KEV 8.2*  --  8.1*   *  --  72   ALBUMIN 2.3* 2.6* 2.7*   PROTTOTAL 6.0*  --  6.9   BILITOTAL 2.7*  --  4.0*   ALKPHOS 359*  --  423*   ALT 38  --  43   *  --  162*       No results found for this or any previous visit (from the past 24 hour(s)).

## 2020-01-08 NOTE — CONSULTS
[facility]  Gastroenterology Consultation         Jim Richard  [address] [address]  64 year old male    Admission Date/Time: 1/7/2020  Primary Care Provider: Talon Elias  Referring / Attending Physician:  Dr. Chely Calhoun    We were asked to see the patient in consultation by Dr. Chely Calhoun for evaluation of alcoholic liver cirrhosis.      CC: weakness    HPI:  Jim Richard is a 64 year old male who presented to [facility] ED with complaints of weakness, shortness of breath, dizziness and lightheadedness. He has a chronic dry cough and complaints of chills. Denies fever, chest pain, nausea, vomiting, melena, hematochezia, hematemesis, or headache. Had abdominal pain yesterday prior to his ultrasound guided paracentesis but now resolved. He has a significant past medical history of alcohol abuse and dependence, anxiety, depression, MI, hypertension, hyperlipidemia, and alcoholic liver cirrhosis with ascites. He states that he has continued to drink alcohol daily despite liver failure and consumes 0.75 L of vodka daily. He was unable to get out of bed yesterday due to generalized weakness and therefore called EMS. He denies usage of NSAIDs or anticoagulation. He denies recent falls. He does report non compliance with medication and has not taken in last month including diuretics.    His labs on 1/7/2020 noted sodium 136, cabon dioxide of 17, creat 1.01, albumin 2.7, protein 6.9, Tbili 4.0, , ALT 43, , and ammonia of 53. CBC noted WBC 4.8, hemoglobin 9.4, platelets of 85. INR 1.26.  Alcohol level 0.08    Ultrasound guided paracentesis removed 4 L of fluid and noted to have WBC ( predominantly mononuclear cells) at 206 and no organisms noted.     ROS: A comprehensive ten point review of systems was negative aside from those in mentioned in the HPI.      PAST MED HX:  I have reviewed this patient's medical history and updated it with  pertinent information if needed.   Past Medical History:   Diagnosis Date     Alcoholism (H) 1/14/2013    had treatment at [facility]     Anxiety      Coronary artery disease 09/09/2014    Nuclear stress test with slight fixed defect inferiorly, no ischemia     Depression     w/anxiety     Esophageal varices with bleeding 04/28/2018    6 varices banded on EGD     Heart attack (H)      Hepatomegaly     Fatty liver, chronic alcoholic     Hyperlipemia      Hypertension      JANIS on CPAP      Peripheral vascular disease (H)      PVD (peripheral vascular disease) (H)      SDH (subdural hematoma) (H) 04/26/2018    Right side, resolved spontaneously     Spinal stenosis        MEDICATIONS:   Prior to Admission Medications   Prescriptions Last Dose Informant Patient Reported? Taking?   DULoxetine (CYMBALTA) 60 MG capsule Past Month at Unknown time Self No Yes   Sig: Take 1 capsule (60 mg) by mouth daily   QUEtiapine (SEROQUEL) 50 MG tablet Past Month at Unknown time Self No Yes   Sig: Take 1 tablet (50 mg) by mouth At Bedtime   acamprosate (CAMPRAL) 333 MG EC tablet Past Month at Unknown time Self Yes Yes   Sig: Take 666 mg by mouth 3 times daily   albuterol (PROAIR HFA/PROVENTIL HFA/VENTOLIN HFA) 108 (90 BASE) MCG/ACT Inhaler prn Self Yes Yes   Sig: Inhale 2 puffs into the lungs every 6 hours as needed    fluticasone-vilanterol (BREO ELLIPTA) 200-25 MCG/INH oral inhaler 1/6/2020 at Unknown time Self Yes Yes   Sig: Inhale 1 puff into the lungs daily    folic acid (FOLVITE) 1 MG tablet Past Month at Unknown time Self No Yes   Sig: Take 1 tablet (1 mg) by mouth daily   furosemide (LASIX) 40 MG tablet Past Month at Unknown time Self No Yes   Sig: Take 1 tablet (40 mg) by mouth daily   hydrOXYzine (VISTARIL) 50 MG capsule prn Self Yes Yes   Sig: Take 50 mg by mouth 3 times daily as needed for itching   lactulose (CHRONULAC) 10 GM/15ML solution Past Month at Unknown time Self Yes Yes   Sig: Take 20 g by mouth 2 times daily    mirtazapine (REMERON) 15 MG tablet Past Month at Unknown time Self No Yes   Sig: Take 1 tablet (15 mg) by mouth At Bedtime   multivitamin, therapeutic with minerals (THERA-VIT-M) TABS tablet Past Month at Unknown time Self No Yes   Sig: Take 1 tablet by mouth daily   nicotine (NICODERM CQ) 21 MG/24HR 24 hr patch 1/6/2020 at Unsure if has one on Self No Yes   Sig: Place 1 patch onto the skin daily   order for DME  Self No No   Sig: Equipment being ordered: Walker Wheels () and Walker ()  Treatment Diagnosis: difficulty walking   pantoprazole (PROTONIX) 40 MG EC tablet  Self No No   Sig: Take 1 tablet (40 mg) by mouth every morning (before breakfast)   propranolol (INDERAL) 10 MG tablet Past Month at Unknown time Self No Yes   Sig: Take 1 tablet (10 mg) by mouth 2 times daily   Patient taking differently: Take 10 mg by mouth 2 times daily Patient thinks he may be taking once daily but unsure   spironolactone (ALDACTONE) 25 MG tablet Past Month at Unknown time Self No Yes   Sig: Take 2 tablets (50 mg) by mouth daily   Patient taking differently: Take 25 mg by mouth daily Rx written 50mg daily, but he believes he's taking 1/day   tamsulosin (FLOMAX) 0.4 MG capsule Past Month at Unknown time Self Yes Yes   Sig: Take 0.4 mg by mouth daily Was discontinued at last admission in 10/2019, but still taking per patient.   traZODone (DESYREL) 100 MG tablet Past Month at Unknown time Self No Yes   Sig: Take 2 tablets (200 mg) by mouth At Bedtime   vitamin B1 (THIAMINE) 100 MG tablet Past Month at Unknown time Self No Yes   Sig: Take 1 tablet (100 mg) by mouth daily      Facility-Administered Medications: None       ALLERGIES:   Allergies   Allergen Reactions     Amlodipine Swelling     Lisinopril      Other reaction(s): Angioedema  Mouth and tongue swelling   Mouth and tongue swelling          SOCIAL HISTORY:  Social History     Tobacco Use     Smoking status: Current Every Day Smoker     Packs/day: 1.00     Types:  Cigarettes     Smokeless tobacco: Never Used     Tobacco comment: Chantix caused nightmares   Substance Use Topics     Alcohol use: Yes     Comment: Last drank Yesterday drinks 750 ml of vodka daily     Drug use: No       FAMILY HISTORY:  Family History   Problem Relation Age of Onset     Mental Illness Son      Coronary Artery Disease Early Onset Mother      Substance Abuse Father      Lung Cancer Father      Substance Abuse Brother      Unknown/Adopted No family hx of      Depression No family hx of      Anxiety Disorder No family hx of      Schizophrenia No family hx of      Bipolar Disorder No family hx of      Suicide No family hx of      Dementia No family hx of      [place] Disease No family hx of      Parkinsonism No family hx of      Autism Spectrum Disorder No family hx of      Intellectual Disability (Mental Retardation) No family hx of        PHYSICAL EXAM:   General  Alert, oriented and comfortable  Vital Signs with Ranges  Temp: 98.5  F (36.9  C) Temp src: Oral BP: 132/73 Pulse: 79   Resp: 18 SpO2: 98 % O2 Device: None (Room air)    I/O last 3 completed shifts:  In: 360 [P.O.:360]  Out: 100 [Urine:100]    Constitutional: alert, mild distress, cooperative and pale   Cardiovascular: negative, PMI normal. No lifts, heaves, or thrills. RRR. No murmurs, clicks gallops or rub  Respiratory: negative, Percussion normal. Good diaphragmatic excursion. Lungs clear  Head: Normocephalic. No masses, lesions, tenderness or abnormalities  Neck: Neck supple. No adenopathy. Thyroid symmetric, normal size,, Carotids without bruits.  Abdomen: Abdomen soft, non-tender. BS normal. No masses, organomegaly, positive findings: distended          ADDITIONAL COMMENTS:   I reviewed the patient's new clinical lab test results.   Recent Labs   Lab Test 01/08/20  0813 01/07/20  0750 12/31/19  1146 12/24/19  2221   WBC 3.9* 4.8 5.5 4.6   HGB 9.3* 9.4* 9.9* 10.2*   MCV 96 95 96 97   PLT 58* 85* 205 146*   INR  --  1.26* 1.24* 1.23*      Recent Labs   Lab Test 01/07/20  0750 12/31/19  1146 11/19/19  1532   POTASSIUM 3.7 3.7 3.8   CHLORIDE 103 110* 107   CO2 17* 23 23   BUN 26 22 17   ANIONGAP 16* 8 8     Recent Labs   Lab Test 01/07/20  1746 01/07/20  0750 12/31/19  1146 11/19/19  1532 11/13/19  1858 11/13/19  1849  02/13/19  2340  09/29/18  0550  09/25/14  0510  06/12/12  0550   ALBUMIN 2.6* 2.7* 2.6* 3.0* 3.0*  --    < > 3.1*   < > 3.2*   < >  --    < >  --    BILITOTAL  --  4.0* 0.8 1.7* 1.0  --    < > 0.4   < > 1.0   < >  --    < >  --    ALT  --  43 30 30 24  --    < > 41   < > 60   < >  --    < >  --    AST  --  162* 91* 89* 58*  --    < > 89*   < > 140*   < >  --    < >  --    PROTEIN  --   --   --   --   --  30*  --   --   --   --   --  Negative  --  Negative   LIPASE  --   --   --   --  499*  --   --  536*  --  585*   < >  --   --   --     < > = values in this interval not displayed.       I reviewed the patient's new imaging results.        CONSULTATION ASSESSMENT AND PLAN:    Jim Richard is a pleasant 64 year old male with alcohol liver cirrhosis that presented with complaints of generalized weakness. He had underwent ultrasound guided paracentesis yesterday. GI consulted for evaluation cirrhosis.    Active Problems:    Alcohol withdrawal (H)  Alcoholic Liver Cirrhosis with ascites    Patient has known alcohol abuse and dependence and consumes 0.75 L of vodka daily. Has developed alcohol liver cirrhosis with ascites. Last EGD noted no evidence of esophageal varices. He has had no evidence of GI bleed. Hemoglobin stable. Fluid from paracentesis notes elevated WBC and primarily mononuclear cells with no evidence of of organisms present. NO concern for SBP at this time. Patient has likely had exacerbation of symptoms associated with liver cirrhosis due to chronic intake of alcohol. His MELD score is 14. He appears comfortable and is tolerating alcohol withdrawal.     - Recommend to continue with aldactone and furosemide and to  monitor creatinine daily. Consider increasing dosage if creatine remains normal.   - Appreciated psychiatry consult and may benefit from chemical dependency counseling  - Patient will need daily CBC and CMP  - Continue with lactulose, pantoprazole and inderal  - Absolute alcohol abstinence is necessary- patient aware that he is at high risk of mortality and morbidity if he continues to drink alcohol  - Continue [facility] protocol    [facility].  Office: [phone]  Cell : [phone] (Dr. Shaw)  Cell: [phone] (Gloria De Leon PA-C)

## 2020-01-08 NOTE — CONSULTS
[facility] [place]  Psychiatry Consultation      Patient name: Jim Richard   MRN: [id number]    Age: 64 year old    YOB: 1955    Identifying information:   The patient is a 64 year old  male who is currently admitted to the hospitalist service on unit 66.    Reason for consult: Assist in comanaging alcohol use disorder    History of present illness:   The patient has a history of alcohol use disorder and various associated comorbidities in addition to a major depressive disorder who was discharged from the hospital at the end of October 2019.  He regretfully admits that shortly after discharge from the hospital, he relapsed on alcohol, eventually escalating his usage back to nearly 1 L of vodka on a daily basis.  Approximately 2 weeks ago, he discontinued all of his medications in the context of severe alcohol usage.  His blood alcohol level on arrival was 0.08.  He was admitted to the hospital for management of alcohol withdrawal symptoms in the context of liver cirrhosis with ascites.  Psychiatric consultation was requested to assist in comanaging his substance use disorder.  On examination today, the patient denied any contributing psychosocial stressors to his current presentation other than inability to maintain sobriety.  He expressed some frustration with his recent attempts to gain admission to a residential treatment program, recalling contacting a few programs only to be declined.  He remains interested in pursuing residential chemical addiction treatment.  He is interested in restarting his antidepressant medication to gain improvement in his mood.  He is denying suicidal or homicidal thoughts today.    Psychiatric Review of Systems:    -- Depressive episode: Mood is depressed, characterized as moderate, accompanied with moderate vegetative symptoms, denies feeling helpless or hopeless, denies suicidal and homicidal thoughts.  -- Uma:  denies  symptoms  -- Psychosis:  denies symptoms  -- Anxiety: denies symptoms corresponding to MARIA FERNANDA or panic disorder  -- PTSD: denies symptoms  -- OCD: denies  symptoms  -- Eating disorder: denies symptoms    Psychiatric History:   Established diagnosis of major depressive disorder with no prior inpatient hospitalizations reported.  He denied prior suicide attempts although prior documentation notes that he had reported 1 remote attempt in the past.  He currently does not have any outpatient mental health providers.    Substance Use History:    Drug of choice is alcohol with pattern of usage corresponding to dependence, further reporting tolerance, loss of control over usage, progressive usage, drinking to the point of blackout and intoxication, developing various medical consequences related to his usage, some of which have included liver cirrhosis, ascites, esophageal varices, and various functional limitations related to his use.  He has been to detox and treatment in the past.  He denied a history of withdrawal seizures or DTs.  No recent illicit substance usage reported.    Medical History:  Refer to the internal medicine notes which I reviewed.       Current Facility-Administered Medications:      albumin human 25 % injection 12.5 g, 12.5 g, Intravenous, Once PRN, Chely Calhoun MD     fluticasone-vilanterol (BREO ELLIPTA) 200-25 MCG/INH inhaler 1 puff, 1 puff, Inhalation, Daily, Chely Calhoun MD, 1 puff at 01/07/20 1622     folic acid (FOLVITE) tablet 1 mg, 1 mg, Oral, Daily, Chely Calhoun MD, 1 mg at 01/07/20 1625     furosemide (LASIX) tablet 40 mg, 40 mg, Oral, Daily, Chely Calhoun MD, 40 mg at 01/07/20 1624     lactulose (CHRONULAC) solution 20 g, 20 g, Oral, BID, Chely Calhoun MD, 20 g at 01/07/20 2221     lidocaine (LMX4) cream, , Topical, Q1H PRN, Chely Calhoun MD     lidocaine 1 % 0.1-1 mL, 0.1-1 mL, Other, Q1H PRN, Chely Calhoun MD     LORazepam (ATIVAN)  tablet 1-2 mg, 1-2 mg, Oral, Q30 Min PRN, 1 mg at 01/07/20 2227 **OR** LORazepam (ATIVAN) injection 1-2 mg, 1-2 mg, Intravenous, Q30 Min PRN, Chely Calhoun MD     magnesium sulfate 4 g in 100 mL sterile water (premade), 4 g, Intravenous, Q4H PRN, Chely Calhoun MD     melatonin tablet 1 mg, 1 mg, Oral, At Bedtime PRN, Chely Calhoun MD, 1 mg at 01/07/20 2221     mirtazapine (REMERON) tablet 15 mg, 15 mg, Oral, At Bedtime, Chely Calhoun MD, 15 mg at 01/07/20 2222     multivitamin w/minerals (THERA-VIT-M) tablet 1 tablet, 1 tablet, Oral, Daily, Chely Calhoun MD, 1 tablet at 01/07/20 1624     naloxone (NARCAN) injection 0.1-0.4 mg, 0.1-0.4 mg, Intravenous, Q2 Min PRN, Chely Calhoun MD     nicotine (NICODERM CQ) 21 MG/24HR 24 hr patch 1 patch, 1 patch, Transdermal, Daily, Chely Calhoun MD, 1 patch at 01/07/20 1622     nicotine Patch in Place, , Transdermal, Q8H, Chely Calhoun MD     ondansetron (ZOFRAN-ODT) ODT tab 4 mg, 4 mg, Oral, Q6H PRN **OR** ondansetron (ZOFRAN) injection 4 mg, 4 mg, Intravenous, Q6H PRN, Chely Calhoun MD     [START ON 1/8/2020] pantoprazole (PROTONIX) EC tablet 40 mg, 40 mg, Oral, QAM AC, Chely Calhoun MD     potassium chloride (KLOR-CON) Packet 20-40 mEq, 20-40 mEq, Oral or Feeding Tube, Q2H PRN, Chely Calhoun MD     potassium chloride 10 mEq in 100 mL intermittent infusion with 10 mg lidocaine, 10 mEq, Intravenous, Q1H PRN, Chely Calhoun MD     potassium chloride 10 mEq in 100 mL sterile water intermittent infusion (premix), 10 mEq, Intravenous, Q1H PRN, Chely Calhoun MD     potassium chloride 20 mEq in 50 mL intermittent infusion, 20 mEq, Intravenous, Q1H PRN, Chely Calhoun MD     potassium chloride ER (K-DUR/KLOR-CON M) CR tablet 20-40 mEq, 20-40 mEq, Oral, Q2H PRN, Chely Calhoun MD     potassium phosphate 15 mmol in D5W 250 mL intermittent infusion, 15 mmol, Intravenous,  "Daily PRN, Chely Calhoun MD     potassium phosphate 20 mmol in D5W 250 mL intermittent infusion, 20 mmol, Intravenous, Q6H PRN, Chely Calhoun MD     potassium phosphate 20 mmol in D5W 500 mL intermittent infusion, 20 mmol, Intravenous, Q6H PRN, Chely Calhoun MD     potassium phosphate 25 mmol in D5W 500 mL intermittent infusion, 25 mmol, Intravenous, Q8H PRN, Chely Calhoun MD     prochlorperazine (COMPAZINE) injection 5 mg, 5 mg, Intravenous, Q6H PRN **OR** prochlorperazine (COMPAZINE) tablet 5 mg, 5 mg, Oral, Q6H PRN **OR** prochlorperazine (COMPAZINE) Suppository 12.5 mg, 12.5 mg, Rectal, Q12H PRN, Chely Calhoun MD     propranolol (INDERAL) tablet 10 mg, 10 mg, Oral, BID, Chely Calhoun MD, 10 mg at 01/07/20 2221     senna-docusate (SENOKOT-S/PERICOLACE) 8.6-50 MG per tablet 1 tablet, 1 tablet, Oral, BID PRN **OR** senna-docusate (SENOKOT-S/PERICOLACE) 8.6-50 MG per tablet 2 tablet, 2 tablet, Oral, BID PRN, Chely Calhoun MD     sodium chloride (PF) 0.9% PF flush 3 mL, 3 mL, Intracatheter, q1 min prn, Chely Calhoun MD     sodium chloride (PF) 0.9% PF flush 3 mL, 3 mL, Intracatheter, Q8H, Chely Calhoun MD, 3 mL at 01/07/20 2225     spironolactone (ALDACTONE) tablet 25 mg, 25 mg, Oral, Daily, Chely Calhoun MD, 25 mg at 01/07/20 1625     vitamin B1 (THIAMINE) tablet 100 mg, 100 mg, Oral, Daily, Chely Calhoun MD, 100 mg at 01/07/20 1625     Social History:  Refer to the psychosocial assessment completed by the .     Family History:    Notable for alcohol use disorder and mild depression.  No knowledge of bipolar disorder or suicides in the family.    Vital Signs:    B/P: 160/82, T: 99.2, P: 98, R: 20  Estimated body mass index is 28.19 kg/m  as calculated from the following:    Height as of this encounter: 1.702 m (5' 7\").    Weight as of this encounter: 81.6 kg (180 lb).       Mental status examination:  Appearance:  " Alert, fair hygiene, no acute distress  Attitude:  Attempts to be cooperative  Eye Contact: Fair  Mood:  Depressed  Affect: Mood congruent and blunted  Speech:  Normal rates, tone, latency, volume. Not pushed or pressured.  Psychomotor Behavior:  No psychomotor abnormalities noted  Thought Process: Linear and logical; not tangential or circumstantial or disorganized  Associations:  Logical; no loose associations Noted  Thought Content:  No obvious paranoia, delusions, ideas of reference, or grandiosity noted. Denies auditory or visual hallucinations. Denies suicidal Ideations. Denies homicidal ideations.  Insight:  Fair  Judgment:  Fair  Oriented to:  time, person, and place  Attention Span and Concentration:  Intact  Recent and Remote Memory: Intact based on interviewing and details provided  Language: Appropriate based on interviewing  Fund of Knowledge: Appropriate based on interviewing  Muscle Strength and Tone: Normal upon visual inspection  Gait and Station: Normal upon visual inspection            Diagnoses:    Alcohol use disorder, severe  Major depressive disorder-recurrent, moderate       Recommendations:  Continue MSSA protool with Ativan; consider valium if withdrawal symptoms are not well controlled however ativan is a safer option in the setting of hepatic impairment.      Remeron has been restarted for management of his depressive disorder.      Chemical dependency consult noted; The patient is agreeable to pursue residential CD treatment.      Please reconsult with psychiatry as needed.

## 2020-01-09 ENCOUNTER — APPOINTMENT (OUTPATIENT)
Dept: OCCUPATIONAL THERAPY | Facility: CLINIC | Age: 65
DRG: 897 | End: 2020-01-09
Payer: MEDICARE

## 2020-01-09 ENCOUNTER — APPOINTMENT (OUTPATIENT)
Dept: PHYSICAL THERAPY | Facility: CLINIC | Age: 65
DRG: 897 | End: 2020-01-09
Payer: MEDICARE

## 2020-01-09 LAB
ALBUMIN SERPL-MCNC: 2.3 G/DL (ref 3.4–5)
ALP SERPL-CCNC: 356 U/L (ref 40–150)
ALT SERPL W P-5'-P-CCNC: 41 U/L (ref 0–70)
ANION GAP SERPL CALCULATED.3IONS-SCNC: 5 MMOL/L (ref 3–14)
AST SERPL W P-5'-P-CCNC: 112 U/L (ref 0–45)
BILIRUB SERPL-MCNC: 2 MG/DL (ref 0.2–1.3)
BUN SERPL-MCNC: 21 MG/DL (ref 7–30)
CALCIUM SERPL-MCNC: 8.4 MG/DL (ref 8.5–10.1)
CHLORIDE SERPL-SCNC: 106 MMOL/L (ref 94–109)
CO2 SERPL-SCNC: 26 MMOL/L (ref 20–32)
CREAT SERPL-MCNC: 1.17 MG/DL (ref 0.66–1.25)
ERYTHROCYTE [DISTWIDTH] IN BLOOD BY AUTOMATED COUNT: 15.4 % (ref 10–15)
GFR SERPL CREATININE-BSD FRML MDRD: 65 ML/MIN/{1.73_M2}
GLUCOSE SERPL-MCNC: 126 MG/DL (ref 70–99)
HCT VFR BLD AUTO: 28.8 % (ref 40–53)
HGB BLD-MCNC: 9.7 G/DL (ref 13.3–17.7)
INTERPRETATION ECG - MUSE: NORMAL
MAGNESIUM SERPL-MCNC: 1.8 MG/DL (ref 1.6–2.3)
MCH RBC QN AUTO: 32.2 PG (ref 26.5–33)
MCHC RBC AUTO-ENTMCNC: 33.7 G/DL (ref 31.5–36.5)
MCV RBC AUTO: 96 FL (ref 78–100)
PHOSPHATE SERPL-MCNC: 2.1 MG/DL (ref 2.5–4.5)
PLATELET # BLD AUTO: 63 10E9/L (ref 150–450)
POTASSIUM SERPL-SCNC: 3.4 MMOL/L (ref 3.4–5.3)
PROT SERPL-MCNC: 6 G/DL (ref 6.8–8.8)
RBC # BLD AUTO: 3.01 10E12/L (ref 4.4–5.9)
SODIUM SERPL-SCNC: 137 MMOL/L (ref 133–144)
WBC # BLD AUTO: 5.5 10E9/L (ref 4–11)

## 2020-01-09 PROCEDURE — 25000128 H RX IP 250 OP 636: Performed by: INTERNAL MEDICINE

## 2020-01-09 PROCEDURE — 85027 COMPLETE CBC AUTOMATED: CPT | Performed by: INTERNAL MEDICINE

## 2020-01-09 PROCEDURE — 25000132 ZZH RX MED GY IP 250 OP 250 PS 637: Mod: GY | Performed by: PHYSICIAN ASSISTANT

## 2020-01-09 PROCEDURE — 97530 THERAPEUTIC ACTIVITIES: CPT | Mod: GO | Performed by: OCCUPATIONAL THERAPIST

## 2020-01-09 PROCEDURE — 80053 COMPREHEN METABOLIC PANEL: CPT | Performed by: INTERNAL MEDICINE

## 2020-01-09 PROCEDURE — 97116 GAIT TRAINING THERAPY: CPT | Mod: GP

## 2020-01-09 PROCEDURE — 97535 SELF CARE MNGMENT TRAINING: CPT | Mod: GO | Performed by: OCCUPATIONAL THERAPIST

## 2020-01-09 PROCEDURE — 36415 COLL VENOUS BLD VENIPUNCTURE: CPT | Performed by: INTERNAL MEDICINE

## 2020-01-09 PROCEDURE — 12000000 ZZH R&B MED SURG/OB

## 2020-01-09 PROCEDURE — 84100 ASSAY OF PHOSPHORUS: CPT | Performed by: INTERNAL MEDICINE

## 2020-01-09 PROCEDURE — 99232 SBSQ HOSP IP/OBS MODERATE 35: CPT | Performed by: INTERNAL MEDICINE

## 2020-01-09 PROCEDURE — 83735 ASSAY OF MAGNESIUM: CPT | Performed by: INTERNAL MEDICINE

## 2020-01-09 PROCEDURE — 25000132 ZZH RX MED GY IP 250 OP 250 PS 637: Mod: GY | Performed by: INTERNAL MEDICINE

## 2020-01-09 RX ORDER — LACTULOSE 10 G/15ML
10 SOLUTION ORAL 2 TIMES DAILY
Status: DISCONTINUED | OUTPATIENT
Start: 2020-01-09 | End: 2020-01-12

## 2020-01-09 RX ADMIN — MIRTAZAPINE 15 MG: 15 TABLET, FILM COATED ORAL at 21:14

## 2020-01-09 RX ADMIN — NICOTINE 1 PATCH: 21 PATCH, EXTENDED RELEASE TRANSDERMAL at 08:08

## 2020-01-09 RX ADMIN — POTASSIUM PHOSPHATE, MONOBASIC 500 MG: 500 TABLET, SOLUBLE ORAL at 13:59

## 2020-01-09 RX ADMIN — FOLIC ACID 1 MG: 1 TABLET ORAL at 08:07

## 2020-01-09 RX ADMIN — Medication 100 MG: at 08:06

## 2020-01-09 RX ADMIN — FLUTICASONE FUROATE AND VILANTEROL TRIFENATATE 1 PUFF: 200; 25 POWDER RESPIRATORY (INHALATION) at 08:08

## 2020-01-09 RX ADMIN — SPIRONOLACTONE 25 MG: 25 TABLET ORAL at 08:07

## 2020-01-09 RX ADMIN — LACTULOSE 20 G: 20 SOLUTION ORAL at 08:07

## 2020-01-09 RX ADMIN — TRAZODONE HYDROCHLORIDE 50 MG: 50 TABLET ORAL at 21:14

## 2020-01-09 RX ADMIN — PANTOPRAZOLE SODIUM 40 MG: 40 TABLET, DELAYED RELEASE ORAL at 06:53

## 2020-01-09 RX ADMIN — LORAZEPAM 1 MG: 2 INJECTION INTRAMUSCULAR; INTRAVENOUS at 07:06

## 2020-01-09 RX ADMIN — LACTULOSE 10 G: 20 SOLUTION ORAL at 21:19

## 2020-01-09 RX ADMIN — PROPRANOLOL HYDROCHLORIDE 10 MG: 10 TABLET ORAL at 21:14

## 2020-01-09 RX ADMIN — LORAZEPAM 2 MG: 2 INJECTION INTRAMUSCULAR; INTRAVENOUS at 04:18

## 2020-01-09 RX ADMIN — PROPRANOLOL HYDROCHLORIDE 10 MG: 10 TABLET ORAL at 08:07

## 2020-01-09 RX ADMIN — POTASSIUM PHOSPHATE, MONOBASIC 500 MG: 500 TABLET, SOLUBLE ORAL at 21:14

## 2020-01-09 RX ADMIN — FUROSEMIDE 40 MG: 40 TABLET ORAL at 08:07

## 2020-01-09 RX ADMIN — POTASSIUM PHOSPHATE, MONOBASIC 500 MG: 500 TABLET, SOLUBLE ORAL at 17:58

## 2020-01-09 RX ADMIN — MULTIPLE VITAMINS W/ MINERALS TAB 1 TABLET: TAB at 08:07

## 2020-01-09 ASSESSMENT — ACTIVITIES OF DAILY LIVING (ADL)
ADLS_ACUITY_SCORE: 22
ADLS_ACUITY_SCORE: 20
ADLS_ACUITY_SCORE: 22

## 2020-01-09 NOTE — PROGRESS NOTES
[facility]    Hospitalist Progress Note      Assessment & Plan   Jim Richard is a 64 year old male who with past medical history of depression, anxiety, hypertension, COPD, sleep apnea on CPAP, alcoholism, alcoholic liver disease/cirrhosis with ascites who is being admitted for alcohol withdrawal.     Alcohol withdrawal with DT  History of alcohol dependence/alcoholism  Endorses drinking 0.75L Vodka/day in the last 2 weeks. Last drink 3pm on 1/6/19. ETOH level in the ED 0.08.  Has tremors on exam. Per chart review, had been in hospital for withdrawal with DT in 10/2019. No known withdrawal seizures.  He wants to quit drinking alcohol. He currently is disoriented and intermittently hallucinating  -CIWA protocol with lorazepam due to elevated bilirubin  - Haldol prn availabe  -Multivitamin, Folic Acid  - appreciate psych consultation  - CD consult, recommend Rule 25     Alcoholic liver disease/cirrhosis, likely superimposed mild alcoholic hepatitis  Ascites  History of esophageal varices  Paracentesis was performed on 12/ 31/2019 with removal of 5 L.  His laboratory shows bilirubin of 4, , ALT 43.  Ammonia is 53.  INR is 1.26. bilirubin down trended to 2.7 and AST to 111 on recheck.   - s/p paracentesis with removal of 4.3L on 1/7/20. Fluid analysis not suggestive of SBP  -Continue with Aldactone 25mg daily and Lasix 40mg daily, will increase aldactone dose if cr remains stable  -Continue with lactulose, dose reduced to 10mg BID by GI due to frequent stooling  -GI consult appreicated  -Continue with PPI  - f/u CMP in AM     Depression/anxiety  - has not taken any meds for ~2 weeks.   - resumed Remeron, restarted Trazodone at lower dose of 50mg at bedtime (PTA 200mg at bedtime)    Hypertension  - will continue Inderal, spironolactone and Lasix as above  - monitor blood pressure and renal function     Obstructive sleep apnea with CPAP  - CPAP per home setting     COPD  - continue prior to  admission inhalers     Tobacco use/depdence;  - smokes 1ppd, counseled cessation. Nicotine patch while here    Hypokalemia  Hypophosphatemia  - continue Kphos 500mg TID x 4 doses today  - recheck labs in AM  - phos still low, K better     DVT Prophylaxis: Pneumatic Compression Devices  Code Status: DNR/DNI    Disposition: Expected discharge in 3-4 days once no longer withdrawing from ETOH.    Chely Calhoun MD  Text Page  (7am to 6pm)    Interval History   He is currently disoriented. Per discussion with nursing staff, not hallucination as much today. He currently thinks he is at [facility] and wondering how he would get to [facility].    Otherwise, denies n/v. He is afebrile.     -Data reviewed today: I reviewed all new labs and imaging results over the last 24 hours. I personally reviewed no images or EKG's today.    Physical Exam   Temp: 98  F (36.7  C) Temp src: Oral BP: 112/64 Pulse: 93   Resp: 18 SpO2: 95 % O2 Device: None (Room air)    Vitals:    01/07/20 0748   Weight: 81.6 kg (180 lb)     Vital Signs with Ranges  Temp:  [97.4  F (36.3  C)-98.4  F (36.9  C)] 98  F (36.7  C)  Pulse:  [66-93] 93  Resp:  [18] 18  BP: ()/(51-64) 112/64  SpO2:  [93 %-95 %] 95 %  I/O last 3 completed shifts:  In: 420 [P.O.:420]  Out: 1425 [Urine:1425]    Constitutional: Alert, awake,disoriented to place and time during visit, but calm and cooperative  Respiratory: Clear to auscultation bilaterally, no wheezing  Cardiovascular: regular rate and rhythm  GI: soft and non-tender, +distension  Skin/Integumen: warm and dry, some bruises on UE      Medications       fluticasone-vilanterol  1 puff Inhalation Daily     folic acid  1 mg Oral Daily     furosemide  40 mg Oral Daily     lactulose  10 g Oral BID     mirtazapine  15 mg Oral At Bedtime     multivitamin w/minerals  1 tablet Oral Daily     nicotine  1 patch Transdermal Daily     nicotine   Transdermal Q8H     pantoprazole  40 mg Oral QAM AC     potassium phosphate (monobasic)   500 mg Oral 4x Daily w/meals     propranolol  10 mg Oral BID     sodium chloride (PF)  3 mL Intracatheter Q8H     spironolactone  25 mg Oral Daily     traZODone  50 mg Oral At Bedtime     vitamin B1  100 mg Oral Daily       Data   Recent Labs   Lab 01/09/20  0841 01/08/20  0813  01/07/20  0750   WBC 5.5 3.9*  --  4.8   HGB 9.7* 9.3*  --  9.4*   MCV 96 96  --  95   PLT 63* 58*  --  85*   INR  --   --   --  1.26*    134  --  136   POTASSIUM 3.4 3.2*  --  3.7   CHLORIDE 106 100  --  103   CO2 26 29  --  17*   BUN 21 21  --  26   CR 1.17 1.09  --  1.01   ANIONGAP 5 5  --  16*   KEV 8.4* 8.2*  --  8.1*   * 110*  --  72   ALBUMIN 2.3* 2.3*   < > 2.7*   PROTTOTAL 6.0* 6.0*  --  6.9   BILITOTAL 2.0* 2.7*  --  4.0*   ALKPHOS 356* 359*  --  423*   ALT 41 38  --  43   * 111*  --  162*    < > = values in this interval not displayed.       No results found for this or any previous visit (from the past 24 hour(s)).

## 2020-01-09 NOTE — PLAN OF CARE
DATE & TIME: [id number] 1/8/2020   Cognitive Concerns/ Orientation : Alert and oriented x 2    BEHAVIOR & AGGRESSION TOOL COLOR: Green  CIWA SCORE: 4, 8, 9, 13, 3- Due to tremors and agitation. Ativan given x3. Haldol given x1  ABNL VS/O2: VSS on room air   MOBILITY: x 2 assist gait belt and walker to [facility].   PAIN MANAGMENT: Patient complains of pain RLQ of abdomen. Heat pack applied with relief.   DIET: Regular, encourage PO fluids   BOWEL/BLADDER: BS+ q4. Incontinent of bowel and bladder. 2 large, loose BMs this shift.  ABNL LAB/BG: K+ = 3.2 (Replaced orally, check in a.m.), Mag= 1.4 (replaced PIV, re-check at 1500 and in a.m.), Phosp= 2.2 (replacement via scheduled phosphorous). Ammonia 53 (trending down)  DRAIN/DEVICES: N/A  TELEMETRY RHYTHM: SR with BBB   SKIN: Dry, red, +2 edema in lower extremeties, multiple abrasions and bruises.  TESTS/PROCEDURES: N/A  D/C DAY/GOALS/PLACE: pending discharge.   OTHER IMPORTANT INFO: Patient impulsive and hallucinates at times. Had to give haldol at 1811 as pt became aggressive towards NA stating that he wanted to leave to get alcohol and cigarettes. Had to start another IV, pt became agitated again after this. Pt now calm and resting.Nicotine patch applied at 2215 to R arm. Psych, PT, OT, SW, CD following. Nursing will continue to monitor and assess.

## 2020-01-09 NOTE — CONSULTS
1/9/2020 chem dep consult acknowledged and closed.  Per EHR, patient is only oriented to self.  He is not appropriate for consult at this time.  He does have Medicare funding so could apply for Rule 25 funding from [facility] as long as he meets income requirements.  I discussed case with unit , Bre BENNETT, and request she consult with chem dep team prior to reordering consult to determine appropriateness.

## 2020-01-09 NOTE — PLAN OF CARE
Discharge Planner PT   Patient plan for discharge: Home  Current status: Pt transferred sit to/from stand x4 this session with CGA. Gait training 150'x2 with Sarah/CGA for safety with FWW. Following gait pt left sitting in chair with alarm on and nursing staff present.   Barriers to return to prior living situation: Fall risk, Impaired balance and strength, level of assistance.   Recommendations for discharge: TCU per plan established by the PT.  Rationale for recommendations: Pt will need continued skilled PT at a TCU to achieve PLOF and independence.        Entered by: Yun Kingsley 01/09/2020 11:39 AM

## 2020-01-09 NOTE — PLAN OF CARE
DATE & TIME: 9250-7241, 1/9/20   Cognitive Concerns/ Orientation : Alert, confused, disoriented x 2 (time, situation)   BEHAVIOR & AGGRESSION TOOL COLOR: Green  CIWA SCORE: 3,2  (for slight tremors)   ABNL VS/O2: VSS on room air   MOBILITY: x 1-2 assist, walked in the muniz today with gait belt and walker   PAIN MANAGMENT: Patient denies pain   DIET: Regular + Supplemental Shakes, encourage fluids   BOWEL/BLADDER: BS active +4. Incontinent of bowel and bladder. Frequent loose stools   ABNL LAB/BG: Phosphorous= 2.1 (scheduled replacement)  DRAIN/DEVICES: PIV- SL   TELEMETRY RHYTHM: Sinus Tach   SKIN: Scattered bruising on upper and lower extremeties, blanchable redness, dry   TESTS/PROCEDURES: N/A  D/C DAY/GOALS/PLACE: Discharge to TCU pending.   OTHER IMPORTANT INFO: Sitter at bedside. Patient impulsive at times. Lactulose decreased. Nursing will continue to monitor and assess.

## 2020-01-09 NOTE — PLAN OF CARE
Discharge Planner OT   Patient plan for discharge: Not stated  Current status: Worked on sit-stand transfers from chair--pt. able ot complete w/ min. A/vc's for safety/hand placement, impulsive;Pt. demo. room mobility w/ min. A/WW;In order to assist w/  increased strength/endurance for ADL's, pt. ambulated approx. 100 feet in hallway, needing vc's + assist due to veering to L;Overall, tolerated well;lacks insight + safety/judgment as far as activity level.    Pt. needed overall min. -mod., A for toileting task--mod. A to doff incont. pad, mod. A to jarocho over feet, min. A to stand and pull-up pad;Pt. attempting to lean far forward to wash hands at sink, very unsafe--needed vc's/redirection to terminate task, completed hand hygiene seated in chair. Sitter present upon departure.  Barriers to return to prior living situation: Current level of assist, weakness, impaired cognition/safety, falls risk  Recommendations for discharge: TCU  Rationale for recommendations: Pt. would benefit from continued skilled OT services to assist pt. in achieving maximal safety/indep. W/ fx.transfers/ADL's.       Entered by: Ena Barakat 01/09/2020 12:19 PM

## 2020-01-09 NOTE — PLAN OF CARE
Cognitive Concerns/ Orientation : AOx2; disoriented to time and situation.  BEHAVIOR & AGGRESSION TOOL COLOR: Green  CIWA SCORE: 2 for tremor  ABNL VS/O2: VSS on room air   MOBILITY: x 1-2 assist, GBW   PAIN MANAGMENT: Patient denies pain   DIET: Regular, encouraging    BOWEL/BLADDER:  Incontinent of bowel and bladdr. Frequent loose stools   ABNL LAB/BG: Phosphorous= 2.1 (scheduled replacement) PO  DRAIN/DEVICES: PIV-SL   TELEMETRY RHYTHM: Sinus Tach   SKIN: Scattered bruising on upper and lower extremeties, blanchable redness, dry   TESTS/PROCEDURES: N/A  D/C DAY/GOALS/PLACE: Discharge to TCU pending.   OTHER IMPORTANT INFO: Sitter at bedside.

## 2020-01-09 NOTE — PROGRESS NOTES
[facility]  Gastroenterology Progress Note     Jim Richard MRN# [id number]   YOB: 1955 Age: 64 year old          Assessment and Plan:   Jim Richard is a pleasant 64 year old male with alcohol liver cirrhosis that presented with complaints of generalized weakness. GI consulted for evaluation cirrhosis.     Active Problems:   Alcohol withdrawal (H)  Alcoholic Liver Cirrhosis with ascites   Patient has known alcohol abuse and dependence and consumes 0.75 L of vodka daily. Has developed alcohol liver cirrhosis with ascites. Underwent ultrasound guided paracentesis removed 4L of fluid. Fluid from paracentesis notes elevated WBC and primarily mononuclear cells with no evidence of of organisms present. NO concern for SBP.  Last EGD noted no evidence of esophageal varices. He has had no evidence of GI bleed. Hemoglobin stable. Patient has likely had exacerbation of symptoms associated with liver cirrhosis due to chronic intake of alcohol. His MELD score is 14. He appears comfortable and is tolerating alcohol withdrawal. Having diarrhea 4-5 times daily.      - Recommend to continue with aldactone and furosemide and to monitor creatinine daily. Consider increasing dosage if creatine remains normal.   - Appreciated psychiatry consult and may benefit from chemical dependency counseling  - Patient will need daily CBC and CMP  - Continue with lactulose- will decrease to 10 g BID, pantoprazole and inderal  - Absolute alcohol abstinence is necessary- patient aware that he is at high risk of mortality and morbidity if he continues to drink alcohol  - Continue CIWA protocol            Alcohol withdrawal syndrome without complication (H)  Alcoholic cirrhosis of liver with ascites (H)      Interval History:   no new complaints, doing well, denies chest pain, denies shortness of breath, denies abdominal pain, alert, oriented to person, place and time and doing well; no cp, sob, n/v/d, or abd  pain.              Review of Systems:   C: NEGATIVE for fever, chills, change in weight  E/M: NEGATIVE for ear, mouth and throat problems  R: NEGATIVE for significant cough or SOB  CV: NEGATIVE for chest pain, palpitations or peripheral edema             Medications:   I have reviewed this patient's current medications    fluticasone-vilanterol  1 puff Inhalation Daily     folic acid  1 mg Oral Daily     furosemide  40 mg Oral Daily     lactulose  10 g Oral BID     mirtazapine  15 mg Oral At Bedtime     multivitamin w/minerals  1 tablet Oral Daily     nicotine  1 patch Transdermal Daily     nicotine   Transdermal Q8H     pantoprazole  40 mg Oral QAM AC     propranolol  10 mg Oral BID     sodium chloride (PF)  3 mL Intracatheter Q8H     spironolactone  25 mg Oral Daily     traZODone  50 mg Oral At Bedtime     vitamin B1  100 mg Oral Daily                  Physical Exam:   Vitals were reviewed  Vital Signs with Ranges  Temp:  [97.4  F (36.3  C)-98.4  F (36.9  C)] 98  F (36.7  C)  Pulse:  [66-93] 93  Resp:  [18] 18  BP: ()/(51-64) 112/64  SpO2:  [93 %-95 %] 95 %  I/O last 3 completed shifts:  In: 420 [P.O.:420]  Out: 1425 [Urine:1425]  Constitutional: healthy, alert and no distress   Cardiovascular: negative, PMI normal. No lifts, heaves, or thrills. RRR. No murmurs, clicks gallops or rub  Respiratory: negative, Percussion normal. Good diaphragmatic excursion. Lungs clear  Head: Normocephalic. No masses, lesions, tenderness or abnormalities  Neck: Neck supple. No adenopathy. Thyroid symmetric, normal size,, Carotids without bruits.  Abdomen: Abdomen soft, non-tender. BS normal. No masses, organomegaly           Data:   I reviewed the patient's new clinical lab test results.   Recent Labs   Lab Test 01/09/20  0841 01/08/20  0813 01/07/20  0750 12/31/19  1146 12/24/19  2221   WBC 5.5 3.9* 4.8 5.5 4.6   HGB 9.7* 9.3* 9.4* 9.9* 10.2*   MCV 96 96 95 96 97   PLT 63* 58* 85* 205 146*   INR  --   --  1.26* 1.24* 1.23*      Recent Labs   Lab Test 01/09/20  0841 01/08/20  0813 01/07/20  0750   POTASSIUM 3.4 3.2* 3.7   CHLORIDE 106 100 103   CO2 26 29 17*   BUN 21 21 26   ANIONGAP 5 5 16*     Recent Labs   Lab Test 01/09/20  0841 01/08/20  0813 01/07/20  1746 01/07/20  0750  11/13/19  1858 11/13/19  1849  02/13/19  2340  09/29/18  0550  09/25/14  0510  06/12/12  0550   ALBUMIN 2.3* 2.3* 2.6* 2.7*   < > 3.0*  --    < > 3.1*   < > 3.2*   < >  --    < >  --    BILITOTAL 2.0* 2.7*  --  4.0*   < > 1.0  --    < > 0.4   < > 1.0   < >  --    < >  --    ALT 41 38  --  43   < > 24  --    < > 41   < > 60   < >  --    < >  --    * 111*  --  162*   < > 58*  --    < > 89*   < > 140*   < >  --    < >  --    PROTEIN  --   --   --   --   --   --  30*  --   --   --   --   --  Negative  --  Negative   LIPASE  --   --   --   --   --  499*  --   --  536*  --  585*   < >  --   --   --     < > = values in this interval not displayed.       I reviewed the patient's new imaging results.    All laboratory data reviewed  All imaging studies reviewed by me.    Gloria De Leon PA-C,  1/9/2020  [facility]  Office : [phone]  Cell: [phone] (Dr. Shaw)  Cell: [phone] (Gloria De Leon PA-C)

## 2020-01-09 NOTE — PLAN OF CARE
"DATE & TIME: 01/08/20 night    Cognitive Concerns/ Orientation : Oriented to self only. Very confused.    BEHAVIOR & AGGRESSION TOOL COLOR: Yellow-Pt threatening bedside sitter, cursing at staff, attempting to exit bed repeatedly.   CIWA SCORE: 3-tremors, anxiety; 14-tremors, agitation, anxiety  (2 mg IV ativan given), 0-sleeping   ABNL VS/O2: VSS, room air  MOBILITY: Assist-2 with gait belt/walker. Pt on bedrest with bathroom privileges. Very weak and cannot stand very long. Attempted to use bedside commode with no results.  PAIN MANAGMENT: Denies pain  DIET: Regular, encourage fluid intake  BOWEL/BLADDER: Incontinent of bladder, no BM this shift  ABNL LAB/BG: Potassium 3.2 replaced in evening, recheck 1/9 morning, Mg replaced 2.3, recheck 1/9, Phosphorus replaced, Alk Phos 359,   DRAIN/DEVICES: R PIV saline locked  TELEMETRY RHYTHM: SR with BBB  SKIN: Bruising, scratches, blanchable redness to coccyx  TESTS/PROCEDURES: n/a  D/C DAY/GOALS/PLACE: pending  OTHER IMPORTANT INFO: OT consult, pt wanting to \"get a jug of whiskey,\" and \"go outside the door for a smoke.\" Nicotine PIP on R deltoid. Sitter at bedside. Followed by PT, OT, Psych, SW, Chem Dep.      "

## 2020-01-10 ENCOUNTER — APPOINTMENT (OUTPATIENT)
Dept: OCCUPATIONAL THERAPY | Facility: CLINIC | Age: 65
DRG: 897 | End: 2020-01-10
Payer: MEDICARE

## 2020-01-10 LAB
ALBUMIN SERPL-MCNC: 2.3 G/DL (ref 3.4–5)
ALP SERPL-CCNC: 338 U/L (ref 40–150)
ALT SERPL W P-5'-P-CCNC: 44 U/L (ref 0–70)
ANION GAP SERPL CALCULATED.3IONS-SCNC: 6 MMOL/L (ref 3–14)
AST SERPL W P-5'-P-CCNC: 111 U/L (ref 0–45)
BILIRUB SERPL-MCNC: 1.4 MG/DL (ref 0.2–1.3)
BUN SERPL-MCNC: 21 MG/DL (ref 7–30)
CALCIUM SERPL-MCNC: 8.3 MG/DL (ref 8.5–10.1)
CHLORIDE SERPL-SCNC: 104 MMOL/L (ref 94–109)
CO2 SERPL-SCNC: 23 MMOL/L (ref 20–32)
CREAT SERPL-MCNC: 1.14 MG/DL (ref 0.66–1.25)
GFR SERPL CREATININE-BSD FRML MDRD: 67 ML/MIN/{1.73_M2}
GLUCOSE SERPL-MCNC: 110 MG/DL (ref 70–99)
PHOSPHATE SERPL-MCNC: 2.9 MG/DL (ref 2.5–4.5)
POTASSIUM SERPL-SCNC: 3.5 MMOL/L (ref 3.4–5.3)
PROT SERPL-MCNC: 6.1 G/DL (ref 6.8–8.8)
SODIUM SERPL-SCNC: 133 MMOL/L (ref 133–144)

## 2020-01-10 PROCEDURE — 25000132 ZZH RX MED GY IP 250 OP 250 PS 637: Mod: GY | Performed by: INTERNAL MEDICINE

## 2020-01-10 PROCEDURE — 80053 COMPREHEN METABOLIC PANEL: CPT | Performed by: INTERNAL MEDICINE

## 2020-01-10 PROCEDURE — 97535 SELF CARE MNGMENT TRAINING: CPT | Mod: GO | Performed by: OCCUPATIONAL THERAPIST

## 2020-01-10 PROCEDURE — 84100 ASSAY OF PHOSPHORUS: CPT | Performed by: INTERNAL MEDICINE

## 2020-01-10 PROCEDURE — 99232 SBSQ HOSP IP/OBS MODERATE 35: CPT | Performed by: INTERNAL MEDICINE

## 2020-01-10 PROCEDURE — 25000132 ZZH RX MED GY IP 250 OP 250 PS 637: Mod: GY | Performed by: PHYSICIAN ASSISTANT

## 2020-01-10 PROCEDURE — 12000000 ZZH R&B MED SURG/OB

## 2020-01-10 PROCEDURE — 36415 COLL VENOUS BLD VENIPUNCTURE: CPT | Performed by: INTERNAL MEDICINE

## 2020-01-10 RX ORDER — SPIRONOLACTONE 25 MG/1
50 TABLET ORAL DAILY
Status: DISCONTINUED | OUTPATIENT
Start: 2020-01-11 | End: 2020-01-11

## 2020-01-10 RX ORDER — TRAZODONE HYDROCHLORIDE 100 MG/1
100 TABLET ORAL AT BEDTIME
Status: DISCONTINUED | OUTPATIENT
Start: 2020-01-10 | End: 2020-01-16 | Stop reason: HOSPADM

## 2020-01-10 RX ORDER — SPIRONOLACTONE 25 MG/1
25 TABLET ORAL ONCE
Status: COMPLETED | OUTPATIENT
Start: 2020-01-10 | End: 2020-01-10

## 2020-01-10 RX ADMIN — TRAZODONE HYDROCHLORIDE 100 MG: 100 TABLET ORAL at 21:55

## 2020-01-10 RX ADMIN — PANTOPRAZOLE SODIUM 40 MG: 40 TABLET, DELAYED RELEASE ORAL at 07:03

## 2020-01-10 RX ADMIN — LACTULOSE 10 G: 20 SOLUTION ORAL at 19:47

## 2020-01-10 RX ADMIN — FLUTICASONE FUROATE AND VILANTEROL TRIFENATATE 1 PUFF: 200; 25 POWDER RESPIRATORY (INHALATION) at 08:43

## 2020-01-10 RX ADMIN — PROPRANOLOL HYDROCHLORIDE 10 MG: 10 TABLET ORAL at 08:43

## 2020-01-10 RX ADMIN — SPIRONOLACTONE 25 MG: 25 TABLET ORAL at 08:43

## 2020-01-10 RX ADMIN — POTASSIUM PHOSPHATE, MONOBASIC 500 MG: 500 TABLET, SOLUBLE ORAL at 08:43

## 2020-01-10 RX ADMIN — MULTIPLE VITAMINS W/ MINERALS TAB 1 TABLET: TAB at 08:43

## 2020-01-10 RX ADMIN — FUROSEMIDE 40 MG: 40 TABLET ORAL at 08:43

## 2020-01-10 RX ADMIN — NICOTINE 1 PATCH: 21 PATCH, EXTENDED RELEASE TRANSDERMAL at 19:48

## 2020-01-10 RX ADMIN — LACTULOSE 10 G: 20 SOLUTION ORAL at 08:42

## 2020-01-10 RX ADMIN — MIRTAZAPINE 15 MG: 15 TABLET, FILM COATED ORAL at 21:55

## 2020-01-10 RX ADMIN — PROPRANOLOL HYDROCHLORIDE 10 MG: 10 TABLET ORAL at 19:47

## 2020-01-10 RX ADMIN — SPIRONOLACTONE 25 MG: 25 TABLET ORAL at 16:14

## 2020-01-10 RX ADMIN — Medication 100 MG: at 08:43

## 2020-01-10 RX ADMIN — FOLIC ACID 1 MG: 1 TABLET ORAL at 08:43

## 2020-01-10 ASSESSMENT — ACTIVITIES OF DAILY LIVING (ADL)
ADLS_ACUITY_SCORE: 20

## 2020-01-10 NOTE — PLAN OF CARE
PT- Attempted to see pt this AM. Pt sleeping at arrival. Pt declined any OOB activity at this time due to fatigue and wanting to rest. Encouraged pt to ambulate with nursing staff when more awake.

## 2020-01-10 NOTE — PROGRESS NOTES
[facility]    Hospitalist Progress Note      Assessment & Plan   Jim Richard is a 64 year old male who with past medical history of depression, anxiety, hypertension, COPD, sleep apnea on CPAP, alcoholism, alcoholic liver disease/cirrhosis with ascites who is being admitted for alcohol withdrawal.     Alcohol withdrawal with DT  History of alcohol dependence/alcoholism  Endorses drinking 0.75L Vodka/day in the last 2 weeks. Last drink 3pm on 1/6/19. ETOH level in the ED 0.08.  Has tremors on exam. Per chart review, had been in hospital for withdrawal with DT in 10/2019. No known withdrawal seizures.  He wants to quit drinking alcohol. Has been disoriented and intermittently hallucinating. On 1/10, he overall appears improved.    -CIWA protocol with lorazepam due to elevated bilirubin, last dose lorazepam am of 1/09   -Haldol prn availabe  -Multivitamin, Folic Acid and thiamine  - appreciate psych consultation  - CD consult, recommend Rule 25     Alcoholic liver disease/cirrhosis, likely superimposed mild alcoholic hepatitis  Ascites  History of esophageal varices  Paracentesis was performed on 12/ 31/2019 with removal of 5 L.  His laboratory shows bilirubin of 4, , ALT 43.  Ammonia is 53.  INR is 1.26 on admission.  Bilirubin down trended to 1.4 and AST to 111 on recheck.   - s/p paracentesis with removal of 4.3L on 1/7/20. Fluid analysis not suggestive of SBP, culture no growth to date  - increase Aldactone to 50mg daily (1/10) and continue with  Lasix 40mg daily  -Continue with lactulose, dose reduced to 10mg BID by GI due to frequent stooling  -GI consult appreicated  -Continue with PPI  - given LFT stable to improve, will f/u labs intermittently     Depression/anxiety  - has not taken any meds for ~2 weeks.   - resumed Remeron, restarted Trazodone at lower dose of 50mg at bedtime, will increase to 100mg on 1/10 (PTA 200mg at bedtime)    Hypertension  - will continue Inderal,  spironolactone and Lasix as above  - monitor blood pressure and renal function     Obstructive sleep apnea with CPAP  - CPAP per home setting     COPD  - continue prior to admission inhalers     Tobacco use/depdence;  - smokes 1ppd, counseled cessation. Nicotine patch while here    Hypokalemia  Hypophosphatemia  - replaced with Kphos 500mg orally with correction  - aldactone increased, that hopefully will help with potassium    Generalized weakness  PT/OT recommend TCU  - social work consult     DVT Prophylaxis: Pneumatic Compression Devices  Code Status: DNR/DNI    Disposition: Expected discharge in 1-2days to TCU if continues to improve     Chely Calhoun MD  Text Page  (7am to 6pm)    Interval History   Still with intermittent confusion, but overall he is better. More oriented to place and time today during visit than yesterday. At times impulsive. C/o tremors, but improves. Denies headache, nausea, vomiting or abdominal pain.    -Data reviewed today: I reviewed all new labs and imaging results over the last 24 hours. I personally reviewed no images or EKG's today.    Physical Exam   Temp: 99.1  F (37.3  C) Temp src: Oral BP: 121/72 Pulse: 85 Heart Rate: 70 Resp: 16 SpO2: 95 % O2 Device: None (Room air)    Vitals:    01/07/20 0748   Weight: 81.6 kg (180 lb)     Vital Signs with Ranges  Temp:  [98.9  F (37.2  C)-99.5  F (37.5  C)] 99.1  F (37.3  C)  Pulse:  [79-85] 85  Heart Rate:  [68-80] 70  Resp:  [16-18] 16  BP: (115-125)/(67-77) 121/72  SpO2:  [93 %-95 %] 95 %  I/O last 3 completed shifts:  In: 1890 [P.O.:1890]  Out: 250 [Urine:250]    Constitutional: Alert, awake,oriented today  Respiratory: Clear to auscultation bilaterally, no wheezing  Cardiovascular: regular rate and rhythm  GI: soft and non-tender, +distension  Skin/Integumen: warm and dry, some bruises on UE      Medications     - MEDICATION INSTRUCTIONS -         fluticasone-vilanterol  1 puff Inhalation Daily     folic acid  1 mg Oral Daily      furosemide  40 mg Oral Daily     lactulose  10 g Oral BID     lidocaine (buffered or not buffered)  5 mL Intradermal Once     mirtazapine  15 mg Oral At Bedtime     multivitamin w/minerals  1 tablet Oral Daily     nicotine  1 patch Transdermal Daily     nicotine   Transdermal Q8H     pantoprazole  40 mg Oral QAM AC     propranolol  10 mg Oral BID     sodium chloride (PF)  3 mL Intracatheter Q8H     spironolactone  25 mg Oral Once     [START ON 1/11/2020] spironolactone  50 mg Oral Daily     traZODone  50 mg Oral At Bedtime     vitamin B1  100 mg Oral Daily       Data   Recent Labs   Lab 01/10/20  0807 01/09/20  0841 01/08/20  0813  01/07/20  0750   WBC  --  5.5 3.9*  --  4.8   HGB  --  9.7* 9.3*  --  9.4*   MCV  --  96 96  --  95   PLT  --  63* 58*  --  85*   INR  --   --   --   --  1.26*    137 134  --  136   POTASSIUM 3.5 3.4 3.2*  --  3.7   CHLORIDE 104 106 100  --  103   CO2 23 26 29  --  17*   BUN 21 21 21  --  26   CR 1.14 1.17 1.09  --  1.01   ANIONGAP 6 5 5  --  16*   KEV 8.3* 8.4* 8.2*  --  8.1*   * 126* 110*  --  72   ALBUMIN 2.3* 2.3* 2.3*   < > 2.7*   PROTTOTAL 6.1* 6.0* 6.0*  --  6.9   BILITOTAL 1.4* 2.0* 2.7*  --  4.0*   ALKPHOS 338* 356* 359*  --  423*   ALT 44 41 38  --  43   * 112* 111*  --  162*    < > = values in this interval not displayed.       No results found for this or any previous visit (from the past 24 hour(s)).

## 2020-01-10 NOTE — PLAN OF CARE
DATE & TIME: 1/9/20 1900-2300  Cognitive Concerns/ Orientation : A&Ox3, disoriented to situation, confused   BEHAVIOR & AGGRESSION TOOL COLOR: Green  CIWA SCORE: 3 for tremor   ABNL VS/O2: VSS on RA  MOBILITY: x 1-2 assist, with gait belt and walker   PAIN MANAGMENT: denied   DIET: Regular   BOWEL/BLADDER: incontinent.  Lactulose decreased today.  ABNL LAB/BG: Phosphorous= 2.1 (scheduled replacement) PO  DRAIN/DEVICES: PIV- SL   TELEMETRY RHYTHM: SR+BBB   SKIN: Scattered bruising on upper and lower extremeties, blanchable redness, dry   TESTS/PROCEDURES: N/A  D/C DAY/GOALS/PLACE: Discharge to TCU pending.   OTHER IMPORTANT INFO: Sitter at bedside. Patient impulsive at times.

## 2020-01-10 NOTE — PLAN OF CARE
Discharge Planner OT   Patient plan for discharge: Not stated  Current status: Pt. able to come supine-sit w/ SBA-CGA;Pt. sat EOB w/ SBA to partcipate in LE dressing task--pt. able to doff/jarocho B socks by bringing LE over opposit knee/SBA;completed sit-stand w/ CGA + vc's for pushing-off of bed/safety;Pt. demo. room mobility w/ CGA/WW;In order to assist w/ increased strength/endurance for ADL's, pt.ambulated approx.300 feet w/ overall CGA, demo.  improvement from yesterday, minimal veering to L/ min.vc's needed to correct;pt. fatigued after long walk, stand-sit to chair w/ CGA/vc's;up in chair w/alarm engaged, NA present.  Barriers to return to prior living situation: Current level of assist, weakness, impaired cognition/safety, falls risk  Recommendations for discharge: TCU  Rationale for recommendations: Pt. would benefit from continued skilled OT services to assist pt. in achieving maximal safety/indep. W/ fx.transfers/ADL's.       Entered by: Ena Barakat 01/10/2020 4:33 PM

## 2020-01-10 NOTE — PROGRESS NOTES
[facility]  Gastroenterology Progress Note     Jim Richard MRN# [id number]   YOB: 1955 Age: 64 year old          Assessment and Plan:   Jim Richard is a pleasant 64 year old male with alcohol liver cirrhosis that presented with complaints of generalized weakness. GI consulted for evaluation cirrhosis.     Active Problems:   Alcohol withdrawal (H)  Alcoholic Liver Cirrhosis with ascites   Patient has known alcohol abuse and dependence and consumes 0.75 L of vodka daily. Has developed alcohol liver cirrhosis with ascites. Underwent ultrasound guided paracentesis removed 4L of fluid. Fluid from paracentesis notes elevated WBC and primarily mononuclear cells with no evidence of of organisms present. NO concern for SBP.  Last EGD noted no evidence of esophageal varices. He has had no evidence of GI bleed. Hemoglobin stable. Patient has likely had exacerbation of symptoms associated with liver cirrhosis due to chronic intake of alcohol. His MELD score is 14. He appears comfortable and is tolerating alcohol withdrawal. Having diarrhea 4-5 times daily.      - Recommend to continue with aldactone and furosemide and to monitor creatinine daily. Consider increasing dosage if creatinine remains normal.   - Appreciated psychiatry consult and may benefit from chemical dependency counseling  - Patient will need daily CBC and CMP  - Continue with lactulose- will decrease to 10 g BID, pantoprazole and inderal ( needs to have 2 bms daily to avoid HE)  - Absolute alcohol abstinence is necessary- patient aware that he is at high risk of mortality and morbidity if he continues to drink alcohol  - Continue CIWA protocol          Alcohol withdrawal syndrome without complication (H)  Alcoholic cirrhosis of liver with ascites (H)      Interval History:   no new complaints, doing well, denies chest pain, denies shortness of breath, denies abdominal pain and doing well; no cp, sob, n/v/d, or abd  pain.              Review of Systems:   C: NEGATIVE for fever, chills, change in weight  E/M: NEGATIVE for ear, mouth and throat problems  R: NEGATIVE for significant cough or SOB  CV: NEGATIVE for chest pain, palpitations or peripheral edema             Medications:   I have reviewed this patient's current medications    fluticasone-vilanterol  1 puff Inhalation Daily     folic acid  1 mg Oral Daily     furosemide  40 mg Oral Daily     lactulose  10 g Oral BID     lidocaine (buffered or not buffered)  5 mL Intradermal Once     mirtazapine  15 mg Oral At Bedtime     multivitamin w/minerals  1 tablet Oral Daily     nicotine  1 patch Transdermal Daily     nicotine   Transdermal Q8H     pantoprazole  40 mg Oral QAM AC     propranolol  10 mg Oral BID     sodium chloride (PF)  3 mL Intracatheter Q8H     spironolactone  25 mg Oral Daily     traZODone  50 mg Oral At Bedtime     vitamin B1  100 mg Oral Daily                  Physical Exam:   Vitals were reviewed  Vital Signs with Ranges  Temp:  [98.9  F (37.2  C)-99.5  F (37.5  C)] 99.1  F (37.3  C)  Pulse:  [79-85] 85  Heart Rate:  [68-80] 70  Resp:  [16-18] 16  BP: (115-125)/(67-77) 121/72  SpO2:  [93 %-95 %] 95 %  I/O last 3 completed shifts:  In: 1890 [P.O.:1890]  Out: 250 [Urine:250]  Constitutional: healthy, alert and no distress   Cardiovascular: negative, PMI normal. No lifts, heaves, or thrills. RRR. No murmurs, clicks gallops or rub  Respiratory: negative, Percussion normal. Good diaphragmatic excursion. Lungs clear  Head: Normocephalic. No masses, lesions, tenderness or abnormalities  Neck: Neck supple. No adenopathy. Thyroid symmetric, normal size,, Carotids without bruits.  Abdomen: Abdomen soft, non-tender. BS normal. No masses, organomegaly           Data:   I reviewed the patient's new clinical lab test results.   Recent Labs   Lab Test 01/09/20  0841 01/08/20  0813 01/07/20  0750 12/31/19  1146 12/24/19  2221   WBC 5.5 3.9* 4.8 5.5 4.6   HGB 9.7* 9.3* 9.4*  9.9* 10.2*   MCV 96 96 95 96 97   PLT 63* 58* 85* 205 146*   INR  --   --  1.26* 1.24* 1.23*     Recent Labs   Lab Test 01/10/20  0807 01/09/20  0841 01/08/20 0813   POTASSIUM 3.5 3.4 3.2*   CHLORIDE 104 106 100   CO2 23 26 29   BUN 21 21 21   ANIONGAP 6 5 5     Recent Labs   Lab Test 01/10/20  0807 01/09/20  0841 01/08/20  0813  11/13/19  1858 11/13/19  1849  02/13/19  2340  09/29/18  0550  09/25/14  0510  06/12/12  0550   ALBUMIN 2.3* 2.3* 2.3*   < > 3.0*  --    < > 3.1*   < > 3.2*   < >  --    < >  --    BILITOTAL 1.4* 2.0* 2.7*   < > 1.0  --    < > 0.4   < > 1.0   < >  --    < >  --    ALT 44 41 38   < > 24  --    < > 41   < > 60   < >  --    < >  --    * 112* 111*   < > 58*  --    < > 89*   < > 140*   < >  --    < >  --    PROTEIN  --   --   --   --   --  30*  --   --   --   --   --  Negative  --  Negative   LIPASE  --   --   --   --  499*  --   --  536*  --  585*   < >  --   --   --     < > = values in this interval not displayed.       I reviewed the patient's new imaging results.    All laboratory data reviewed  All imaging studies reviewed by me.    Gloria De Leon PA-C,  1/10/2020  [facility]  Office : [phone]  Cell: [phone] (Dr. Shaw)  Cell: [phone] (Gloria De Leon PA-C)

## 2020-01-10 NOTE — PLAN OF CARE
DATE & TIME: 1/10/20 3396-0646  Cognitive Concerns/ Orientation : A&Ox3, disoriented to situation, confused.    BEHAVIOR & AGGRESSION TOOL COLOR: Green  CIWA SCORE: 2 for tremor  ABNL VS/O2: VSS on RA  MOBILITY: x 1-2 assist, with gait belt and walker   PAIN MANAGMENT: denied   DIET: Regular   BOWEL/BLADDER: incontinent.  Lactulose decreased today.  ABNL LAB/BG: Phosphorous= 2.1 (scheduled replacement) PO  DRAIN/DEVICES: PIV- SL   TELEMETRY RHYTHM: SR+BBB   SKIN: Scattered bruising on upper and lower extremeties, blanchable redness, dry. Jenifer area red and painful during cleaning. Barrier cream applied.   TESTS/PROCEDURES: N/A  D/C DAY/GOALS/PLACE: Discharge to TCU pending.   OTHER IMPORTANT INFO: Sitter at bedside. Patient impulsive at times. Pt expresses desire to leave the hospital, asking bedside sitter to let him leave in exchange for money because he wants to smoke. Pt somewhat redirectable.

## 2020-01-11 ENCOUNTER — APPOINTMENT (OUTPATIENT)
Dept: PHYSICAL THERAPY | Facility: CLINIC | Age: 65
DRG: 897 | End: 2020-01-11
Payer: MEDICARE

## 2020-01-11 ENCOUNTER — APPOINTMENT (OUTPATIENT)
Dept: OCCUPATIONAL THERAPY | Facility: CLINIC | Age: 65
DRG: 897 | End: 2020-01-11
Payer: MEDICARE

## 2020-01-11 LAB
ALBUMIN UR-MCNC: NEGATIVE MG/DL
ANION GAP SERPL CALCULATED.3IONS-SCNC: 5 MMOL/L (ref 3–14)
APPEARANCE UR: CLEAR
BILIRUB UR QL STRIP: NEGATIVE
BUN SERPL-MCNC: 26 MG/DL (ref 7–30)
CALCIUM SERPL-MCNC: 8.4 MG/DL (ref 8.5–10.1)
CHLORIDE SERPL-SCNC: 105 MMOL/L (ref 94–109)
CO2 SERPL-SCNC: 26 MMOL/L (ref 20–32)
COLOR UR AUTO: YELLOW
CREAT SERPL-MCNC: 1.2 MG/DL (ref 0.66–1.25)
GFR SERPL CREATININE-BSD FRML MDRD: 63 ML/MIN/{1.73_M2}
GLUCOSE SERPL-MCNC: 118 MG/DL (ref 70–99)
GLUCOSE UR STRIP-MCNC: NEGATIVE MG/DL
HGB UR QL STRIP: NEGATIVE
KETONES UR STRIP-MCNC: NEGATIVE MG/DL
LEUKOCYTE ESTERASE UR QL STRIP: NEGATIVE
NITRATE UR QL: NEGATIVE
PH UR STRIP: 7 PH (ref 5–7)
POTASSIUM SERPL-SCNC: 3.6 MMOL/L (ref 3.4–5.3)
RBC #/AREA URNS AUTO: 0 /HPF (ref 0–2)
SODIUM SERPL-SCNC: 136 MMOL/L (ref 133–144)
SOURCE: NORMAL
SP GR UR STRIP: 1.01 (ref 1–1.03)
UROBILINOGEN UR STRIP-MCNC: 2 MG/DL (ref 0–2)
WBC #/AREA URNS AUTO: 1 /HPF (ref 0–5)

## 2020-01-11 PROCEDURE — 81001 URINALYSIS AUTO W/SCOPE: CPT | Performed by: INTERNAL MEDICINE

## 2020-01-11 PROCEDURE — 25000132 ZZH RX MED GY IP 250 OP 250 PS 637: Mod: GY | Performed by: INTERNAL MEDICINE

## 2020-01-11 PROCEDURE — 80048 BASIC METABOLIC PNL TOTAL CA: CPT | Performed by: INTERNAL MEDICINE

## 2020-01-11 PROCEDURE — 97535 SELF CARE MNGMENT TRAINING: CPT | Mod: GO

## 2020-01-11 PROCEDURE — 97530 THERAPEUTIC ACTIVITIES: CPT | Mod: GP

## 2020-01-11 PROCEDURE — 25000132 ZZH RX MED GY IP 250 OP 250 PS 637: Mod: GY | Performed by: PHYSICIAN ASSISTANT

## 2020-01-11 PROCEDURE — 12000000 ZZH R&B MED SURG/OB

## 2020-01-11 PROCEDURE — 25000128 H RX IP 250 OP 636: Performed by: INTERNAL MEDICINE

## 2020-01-11 PROCEDURE — 99232 SBSQ HOSP IP/OBS MODERATE 35: CPT | Performed by: INTERNAL MEDICINE

## 2020-01-11 PROCEDURE — 97116 GAIT TRAINING THERAPY: CPT | Mod: GP

## 2020-01-11 PROCEDURE — 36415 COLL VENOUS BLD VENIPUNCTURE: CPT | Performed by: INTERNAL MEDICINE

## 2020-01-11 RX ORDER — HYDROXYZINE HYDROCHLORIDE 25 MG/1
25 TABLET, FILM COATED ORAL EVERY 6 HOURS PRN
Status: DISCONTINUED | OUTPATIENT
Start: 2020-01-11 | End: 2020-01-14

## 2020-01-11 RX ORDER — SPIRONOLACTONE 25 MG/1
25 TABLET ORAL DAILY
Status: DISCONTINUED | OUTPATIENT
Start: 2020-01-12 | End: 2020-01-12

## 2020-01-11 RX ADMIN — LACTULOSE 10 G: 20 SOLUTION ORAL at 21:18

## 2020-01-11 RX ADMIN — TRAZODONE HYDROCHLORIDE 100 MG: 100 TABLET ORAL at 21:18

## 2020-01-11 RX ADMIN — PROPRANOLOL HYDROCHLORIDE 10 MG: 10 TABLET ORAL at 21:18

## 2020-01-11 RX ADMIN — PROPRANOLOL HYDROCHLORIDE 10 MG: 10 TABLET ORAL at 08:13

## 2020-01-11 RX ADMIN — MULTIPLE VITAMINS W/ MINERALS TAB 1 TABLET: TAB at 08:13

## 2020-01-11 RX ADMIN — HYDROXYZINE HYDROCHLORIDE 25 MG: 25 TABLET, FILM COATED ORAL at 16:05

## 2020-01-11 RX ADMIN — FUROSEMIDE 40 MG: 40 TABLET ORAL at 08:13

## 2020-01-11 RX ADMIN — FOLIC ACID 1 MG: 1 TABLET ORAL at 08:13

## 2020-01-11 RX ADMIN — Medication 100 MG: at 08:13

## 2020-01-11 RX ADMIN — PANTOPRAZOLE SODIUM 40 MG: 40 TABLET, DELAYED RELEASE ORAL at 06:40

## 2020-01-11 RX ADMIN — ONDANSETRON 4 MG: 2 INJECTION INTRAMUSCULAR; INTRAVENOUS at 13:26

## 2020-01-11 RX ADMIN — NICOTINE 1 PATCH: 21 PATCH, EXTENDED RELEASE TRANSDERMAL at 21:19

## 2020-01-11 RX ADMIN — MIRTAZAPINE 15 MG: 15 TABLET, FILM COATED ORAL at 21:18

## 2020-01-11 RX ADMIN — FLUTICASONE FUROATE AND VILANTEROL TRIFENATATE 1 PUFF: 200; 25 POWDER RESPIRATORY (INHALATION) at 08:17

## 2020-01-11 RX ADMIN — SPIRONOLACTONE 50 MG: 25 TABLET ORAL at 08:13

## 2020-01-11 RX ADMIN — LACTULOSE 10 G: 20 SOLUTION ORAL at 08:13

## 2020-01-11 ASSESSMENT — ACTIVITIES OF DAILY LIVING (ADL)
ADLS_ACUITY_SCORE: 20
ADLS_ACUITY_SCORE: 19

## 2020-01-11 NOTE — PLAN OF CARE
DATE & TIME: 1/10/20 6768-4916  Cognitive Concerns/ Orientation : A&Ox3, disoriented to situation, confused; impulsive at times.    BEHAVIOR & AGGRESSION TOOL COLOR: Green  CIWA SCORE: Scoring 2's for mild tremors  ABNL VS/O2: Tmax 100.4 - now 98.9  MOBILITY: SBA/GB/walker - ambulated in halls x 3; up in chair for meals  PAIN MANAGMENT: Denies  DIET: Regular   BOWEL/BLADDER: Incontinent;  Lactulose decreased yesterday - incontinent of stool x 1  ABNL LAB/BG: Phosphorous 2.1 (scheduled oral phosphorus replacement)   DRAIN/DEVICES: PIV - SL   TELEMETRY RHYTHM: SR/SD with BBB   SKIN: Scattered bruising on upper and lower extremities, blanchable redness, dry. Jenifer area red - Barrier cream applied.   TESTS/PROCEDURES: None  D/C DAY/GOALS/PLACE: Discharge to TCU if continues to improve.

## 2020-01-11 NOTE — CONSULTS
Care Transition Initial Assessment - SW     Met with: Patient    Active Problems:    Alcohol withdrawal (H)       DATA  Lives With: friend(s)   Living Arrangements: [facility]  Identified issues/concerns regarding health management: PT/OT recommend TCU. Pt reports he has been to [facility] in the past. Provided him with a TCU list. Pt reports he is feelings a lot better and stronger and wants to go home. He believes his roommate can help him, if needed. Discussed home care. He would be agreeable to this service. He asked about going to  treatment. He has Medicare insurance which limits his choices. He attempted to go to [facility] about 4-5 months ago but was turned away. He believes it may have been because he was a no-show in the past. He has also tried [facility] but was told they do not take Medicare. Suggested trying to get a Rule 25 to qualify for MA coverage. Pt reports he has paid privately for [facility] in the past. SW will put information for [facility] on his AVS. Perhaps they will have suggestions. He was appreciative.      Transportation Anticipated: family or friend will provide    ASSESSMENT  Cognitive Status: Appears alert and oriented.   Concerns to be addressed: On-going.     PLAN  Financial costs for the patient includes: None.  Patient given options and choices for discharge: Yes.  Patient/family is agreeable to the plan? YES  Patient Goals and Preferences: discharge home with home care.  Patient anticipates discharging to: discharge home with home care.    JULIA John, SW  [phone]  [facility]    Addendum: After talking with MD, pt now agreeable to TCU referrals. Referrals sent via [facility].

## 2020-01-11 NOTE — PLAN OF CARE
Discharge Planner OT   Patient plan for discharge: Would like to go home  Current status: sup>sit mod I w/use of bed rails and increased time. Sit to stand from EOB CGA w/FWW and vc' for for hand placement safety.  Ambulated 150 ft w/FWW and CGA-SBA to increases activity tolerance for ADL's. Pt fatigued after mobility. Stand to sit Chair CGA w/vc's for hand placement safety. Pt educated on LB dressing w/use of reacher. Pt min A to use reacher for LB dressing.   Barriers to return to prior living situation: Current level of assist, weakness, impaired cognition/safety, falls risk  Recommendations for discharge: TCU  Rationale for recommendations: Pt. would benefit from continued skilled OT services to assist pt. in achieving maximal safety/indep. W/ fx.transfers/ADL's.       Entered by: Shannan Fang 01/11/2020 12:09 PM

## 2020-01-11 NOTE — PLAN OF CARE
DATE & TIME: 1/10/2020 4167-4700    Cognitive Concerns/ Orientation : A+Ox3 or 4   BEHAVIOR & AGGRESSION TOOL COLOR: Yellow; anxious  CIWA SCORE: 2   ABNL VS/O2: RA  MOBILITY: Assist x1, Walker +GB  PAIN MANAGMENT: Denies  DIET: Reg  BOWEL/BLADDER: Incontinent of bladder; not this shift  ABNL LAB/BG: NA  DRAIN/DEVICES: None  TELEMETRY RHYTHM: SR with BBB  SKIN: Wound intact  TESTS/PROCEDURES: None  D/C DAY/GOALS/PLACE: TBD; TCU?  OTHER IMPORTANT INFO: Asking for Ativan

## 2020-01-11 NOTE — PLAN OF CARE
Discharge Planner PT   Patient plan for discharge: Home  Current status: Pt transferred sit to/from stand x6 this session with CGA. Gait training 150'+150'+350' with FWW and CGA. Pt needed x2 sitting rest breaks due to fatigue. Following gait pt left sitting in chair with alarm on and needs in reach.   Barriers to return to prior living situation: Fall risk, Impaired balance and strength, level of assistance.   Recommendations for discharge: TCU per plan established by the PT.  Rationale for recommendations: Pt will need continued skilled PT at a TCU to achieve PLOF and independence.       Entered by: Yun Kingsley 01/11/2020 9:55 AM

## 2020-01-11 NOTE — PROGRESS NOTES
[facility]    Hospitalist Progress Note      Assessment & Plan   Jim Richard is a 64 year old male who with past medical history of depression, anxiety, hypertension, COPD, sleep apnea on CPAP, alcoholism, alcoholic liver disease/cirrhosis with ascites who is being admitted for alcohol withdrawal.     Alcohol withdrawal with DT: improved  History of alcohol dependence/alcoholism  Endorses drinking 0.75L Vodka/day in the last 2 weeks. Last drink 3pm on 1/6/19. ETOH level in the ED 0.08.  Has tremors on exam. Per chart review, had been in hospital for withdrawal with DT in 10/2019. No known withdrawal seizures.  He wants to quit drinking alcohol. Has been disoriented and intermittently hallucinating. On 1/10, he overall appears improved. On 1/11, he is more clear, oriented. Complains of anxiety.      -CIWA protocol with lorazepam due to elevated bilirubin, last dose lorazepam am of 1/09 am  - Hydroxyzine prn added for anxiety  - Multivitamin, Folic Acid and thiamine  - appreciate psych consultation  - CD consult, recommend Rule 25     Alcoholic liver disease/cirrhosis, likely superimposed mild alcoholic hepatitis  Ascites  History of esophageal varices  Paracentesis was performed on 12/ 31/2019 with removal of 5 L.  His laboratory shows bilirubin of 4, , ALT 43.  Ammonia is 53.  INR is 1.26 on admission.  Bilirubin down trended to 1.4 and AST to 111 on recheck.   - s/p paracentesis with removal of 4.3L on 1/7/20. Fluid analysis not suggestive of SBP, culture no growth to date  - increase Aldactone to 50mg daily (1/10) and continue with  Lasix 40mg daily. Cr increased from 1.14->1.2 this am), decrease aldactone down to 25mg daily (has received a dose today.) Scheduled BMP for tomorrow 6am before diuretics  -Continue with lactulose, dose reduced to 10mg BID by GI due to frequent stooling,now having 2-3 BMs/day  -GI consult appreicated  -Continue with PPI  - given LFT stable to improve,  will f/u labs intermittently     Depression/anxiety  - has not taken any meds for ~2 weeks.   - resumed Remeron, restarted Trazodone at lower dose of 50mg at bedtime, will increase to 100mg on 1/10 (PTA 200mg at bedtime)  - resume hydroxyzine prn q6hrs    Hypertension  - will continue Inderal, spironolactone and Lasix as above  - monitor blood pressure and renal function  - BMP in AM     Obstructive sleep apnea with CPAP  - CPAP per home setting     COPD  - continue prior to admission inhalers     Tobacco use/depdence;  - smokes 1ppd, counseled cessation. Nicotine patch while here    Hypokalemia  Hypophosphatemia  - replaced with Kphos 500mg orally with correction  - monitor    Generalized weakness  PT/OT recommend TCU. Patient reported to  that he wants to discharge home with home care. Discussed with patient again, he is in agreement for TCU discharge, social work notified    Dysuria:  - check UA/UC     DVT Prophylaxis: Pneumatic Compression Devices  Code Status: DNR/DNI    Disposition: Expected discharge in tomorrow if renal function stable, plan is for TCU at this time    Chely Calhoun MD  Text Page  (7am to 6pm)    Interval History   Reported dysuria today. Tmax was 100.4 yesterday around 4pm, however 99.1 overnight. Remains afebrile throughout the day.   Denies n/v or abdominal pain. Reports decreasing frequency of lactulose helped a lot.   Not sleeping well due to his roommate. He is alert, oriented and more clear today.     -Data reviewed today: I reviewed all new labs and imaging results over the last 24 hours. I personally reviewed no images or EKG's today.    Physical Exam   Temp: 98.4  F (36.9  C) Temp src: Oral BP: 113/62 Pulse: 60 Heart Rate: 57 Resp: 16 SpO2: 96 % O2 Device: None (Room air)    Vitals:    01/07/20 0748   Weight: 81.6 kg (180 lb)     Vital Signs with Ranges  Temp:  [98  F (36.7  C)-99.1  F (37.3  C)] 98.4  F (36.9  C)  Pulse:  [60] 60  Heart Rate:  [57-64] 57  Resp:  [16]  16  BP: (107-124)/(62-66) 113/62  SpO2:  [94 %-97 %] 96 %  I/O last 3 completed shifts:  In: 630 [P.O.:630]  Out: 50 [Urine:50]    Constitutional: Alert, awake,oriented today  Respiratory: Clear to auscultation bilaterally, no wheezing  Cardiovascular: regular rate and rhythm  GI: soft and non-tender, +distension  Skin/Integumen: warm and dry, some bruises on UE      Medications       fluticasone-vilanterol  1 puff Inhalation Daily     folic acid  1 mg Oral Daily     furosemide  40 mg Oral Daily     lactulose  10 g Oral BID     lidocaine (buffered or not buffered)  5 mL Intradermal Once     mirtazapine  15 mg Oral At Bedtime     multivitamin w/minerals  1 tablet Oral Daily     nicotine  1 patch Transdermal Daily     nicotine   Transdermal Q8H     pantoprazole  40 mg Oral QAM AC     propranolol  10 mg Oral BID     sodium chloride (PF)  3 mL Intracatheter Q8H     [START ON 1/12/2020] spironolactone  25 mg Oral Daily     traZODone  100 mg Oral At Bedtime       Data   Recent Labs   Lab 01/11/20  0824 01/10/20  0807 01/09/20  0841 01/08/20  0813  01/07/20  0750   WBC  --   --  5.5 3.9*  --  4.8   HGB  --   --  9.7* 9.3*  --  9.4*   MCV  --   --  96 96  --  95   PLT  --   --  63* 58*  --  85*   INR  --   --   --   --   --  1.26*    133 137 134  --  136   POTASSIUM 3.6 3.5 3.4 3.2*  --  3.7   CHLORIDE 105 104 106 100  --  103   CO2 26 23 26 29  --  17*   BUN 26 21 21 21  --  26   CR 1.20 1.14 1.17 1.09  --  1.01   ANIONGAP 5 6 5 5  --  16*   KEV 8.4* 8.3* 8.4* 8.2*  --  8.1*   * 110* 126* 110*  --  72   ALBUMIN  --  2.3* 2.3* 2.3*   < > 2.7*   PROTTOTAL  --  6.1* 6.0* 6.0*  --  6.9   BILITOTAL  --  1.4* 2.0* 2.7*  --  4.0*   ALKPHOS  --  338* 356* 359*  --  423*   ALT  --  44 41 38  --  43   AST  --  111* 112* 111*  --  162*    < > = values in this interval not displayed.       No results found for this or any previous visit (from the past 24 hour(s)).

## 2020-01-11 NOTE — PLAN OF CARE
DATE & TIME: 1/11/2020 0477-2592                        Cognitive Concerns/ Orientation : A&Ox3-4, calm and cooperative  BEHAVIOR & AGGRESSION TOOL COLOR: Green  CIWA SCORE: 2 for slight tremor  ABNL VS/O2: VSS on RA  MOBILITY: Assist x1, Walker +GB  PAIN MANAGMENT: Denies  DIET: Reg  BOWEL/BLADDER: Incontinent of B/B, one BM this shift.   ABNL LAB/BG: NA  DRAIN/DEVICES: None  TELEMETRY RHYTHM: SR with BBB  SKIN: Wound intact   TESTS/PROCEDURES: None  D/C DAY/GOALS/PLACE: Pending  OTHER IMPORTANT INFO: SW/OT/PT following

## 2020-01-12 LAB
ANION GAP SERPL CALCULATED.3IONS-SCNC: 5 MMOL/L (ref 3–14)
BACTERIA SPEC CULT: NO GROWTH
BUN SERPL-MCNC: 33 MG/DL (ref 7–30)
CALCIUM SERPL-MCNC: 8.3 MG/DL (ref 8.5–10.1)
CHLORIDE SERPL-SCNC: 104 MMOL/L (ref 94–109)
CO2 SERPL-SCNC: 26 MMOL/L (ref 20–32)
CREAT SERPL-MCNC: 1.33 MG/DL (ref 0.66–1.25)
GFR SERPL CREATININE-BSD FRML MDRD: 55 ML/MIN/{1.73_M2}
GLUCOSE SERPL-MCNC: 99 MG/DL (ref 70–99)
POTASSIUM SERPL-SCNC: 3.9 MMOL/L (ref 3.4–5.3)
SODIUM SERPL-SCNC: 135 MMOL/L (ref 133–144)
SPECIMEN SOURCE: NORMAL

## 2020-01-12 PROCEDURE — 12000000 ZZH R&B MED SURG/OB

## 2020-01-12 PROCEDURE — 99232 SBSQ HOSP IP/OBS MODERATE 35: CPT | Performed by: INTERNAL MEDICINE

## 2020-01-12 PROCEDURE — 80048 BASIC METABOLIC PNL TOTAL CA: CPT | Performed by: INTERNAL MEDICINE

## 2020-01-12 PROCEDURE — 25800030 ZZH RX IP 258 OP 636: Performed by: INTERNAL MEDICINE

## 2020-01-12 PROCEDURE — 25000132 ZZH RX MED GY IP 250 OP 250 PS 637: Mod: GY | Performed by: PHYSICIAN ASSISTANT

## 2020-01-12 PROCEDURE — 25000132 ZZH RX MED GY IP 250 OP 250 PS 637: Mod: GY | Performed by: INTERNAL MEDICINE

## 2020-01-12 PROCEDURE — 36415 COLL VENOUS BLD VENIPUNCTURE: CPT | Performed by: INTERNAL MEDICINE

## 2020-01-12 RX ORDER — LACTULOSE 10 G/15ML
10 SOLUTION ORAL 3 TIMES DAILY
Status: DISCONTINUED | OUTPATIENT
Start: 2020-01-12 | End: 2020-01-16 | Stop reason: HOSPADM

## 2020-01-12 RX ORDER — SODIUM CHLORIDE 9 MG/ML
INJECTION, SOLUTION INTRAVENOUS CONTINUOUS
Status: ACTIVE | OUTPATIENT
Start: 2020-01-12 | End: 2020-01-12

## 2020-01-12 RX ADMIN — HYDROXYZINE HYDROCHLORIDE 25 MG: 25 TABLET, FILM COATED ORAL at 13:18

## 2020-01-12 RX ADMIN — LACTULOSE 10 G: 10 SOLUTION ORAL at 21:31

## 2020-01-12 RX ADMIN — FLUTICASONE FUROATE AND VILANTEROL TRIFENATATE 1 PUFF: 200; 25 POWDER RESPIRATORY (INHALATION) at 07:44

## 2020-01-12 RX ADMIN — PROPRANOLOL HYDROCHLORIDE 10 MG: 10 TABLET ORAL at 21:28

## 2020-01-12 RX ADMIN — MIRTAZAPINE 15 MG: 15 TABLET, FILM COATED ORAL at 21:28

## 2020-01-12 RX ADMIN — LACTULOSE 10 G: 10 SOLUTION ORAL at 16:43

## 2020-01-12 RX ADMIN — SODIUM CHLORIDE: 9 INJECTION, SOLUTION INTRAVENOUS at 11:08

## 2020-01-12 RX ADMIN — LACTULOSE 10 G: 20 SOLUTION ORAL at 07:44

## 2020-01-12 RX ADMIN — DEXTRAN 70 AND HYPROMELLOSE 2910 2 DROP: 1; 3 SOLUTION/ DROPS OPHTHALMIC at 05:47

## 2020-01-12 RX ADMIN — TRAZODONE HYDROCHLORIDE 100 MG: 100 TABLET ORAL at 21:28

## 2020-01-12 RX ADMIN — SPIRONOLACTONE 25 MG: 25 TABLET ORAL at 07:45

## 2020-01-12 RX ADMIN — DEXTRAN 70 AND HYPROMELLOSE 2910 2 DROP: 1; 3 SOLUTION/ DROPS OPHTHALMIC at 19:37

## 2020-01-12 RX ADMIN — FOLIC ACID 1 MG: 1 TABLET ORAL at 07:45

## 2020-01-12 RX ADMIN — DEXTRAN 70 AND HYPROMELLOSE 2910 2 DROP: 1; 3 SOLUTION/ DROPS OPHTHALMIC at 16:41

## 2020-01-12 RX ADMIN — MULTIPLE VITAMINS W/ MINERALS TAB 1 TABLET: TAB at 07:45

## 2020-01-12 RX ADMIN — PROPRANOLOL HYDROCHLORIDE 10 MG: 10 TABLET ORAL at 07:44

## 2020-01-12 RX ADMIN — PANTOPRAZOLE SODIUM 40 MG: 40 TABLET, DELAYED RELEASE ORAL at 06:26

## 2020-01-12 RX ADMIN — FUROSEMIDE 40 MG: 40 TABLET ORAL at 07:45

## 2020-01-12 RX ADMIN — NICOTINE 1 PATCH: 21 PATCH, EXTENDED RELEASE TRANSDERMAL at 21:28

## 2020-01-12 RX ADMIN — DEXTRAN 70 AND HYPROMELLOSE 2910 2 DROP: 1; 3 SOLUTION/ DROPS OPHTHALMIC at 11:42

## 2020-01-12 ASSESSMENT — ACTIVITIES OF DAILY LIVING (ADL)
ADLS_ACUITY_SCORE: 19

## 2020-01-12 NOTE — PROGRESS NOTES
D: DANIELE following for discharge planning.   I: [facility] is full. Pt declined by [facility]. Referrals out to [facility] and [facility].   P: DANIELE following.     JULIA John, LGSW  [phone]  [facility]

## 2020-01-12 NOTE — PROGRESS NOTES
[facility]    Hospitalist Progress Note      Assessment & Plan   Jim Richard is a 64 year old male who with past medical history of depression, anxiety, hypertension, COPD, sleep apnea on CPAP, alcoholism, alcoholic liver disease/cirrhosis with ascites who is being admitted for alcohol withdrawal.     Alcohol withdrawal with DT: improved  History of alcohol dependence/alcoholism  Endorses drinking 0.75L Vodka/day in the last 2 weeks. Last drink 3pm on 1/6/19. ETOH level in the ED 0.08.  Has tremors on exam. Per chart review, had been in hospital for withdrawal with DT in 10/2019. No known withdrawal seizures.  He wants to quit drinking alcohol. Has been disoriented and intermittently hallucinating. On 1/10, he overall appears improved.On 1/11, he is more clear, oriented. Complains of anxiety.      -CIWA protocol with lorazepam due to elevated bilirubin, last dose lorazepam am of 1/09 am.   - discontinue CIWA today.   - Hydroxyzine prn for anxiety  - Multivitamin, Folic Acid and thiamine  - appreciate psych consultation  - CD consult, recommend Rule 25     Alcoholic liver disease/cirrhosis, likely superimposed mild alcoholic hepatitis  Ascites  History of esophageal varices  Paracentesis was performed on 12/ 31/2019 with removal of 5 L.  His laboratory shows bilirubin of 4, , ALT 43.  Ammonia is 53.  INR is 1.26 on admission.  Bilirubin down trended to 1.4 and AST to 111 on recheck.   - s/p paracentesis with removal of 4.3L on 1/7/20. Fluid analysis not suggestive of SBP, culture no growth to date  -Continue with lactulose, dose reduced to 10mg BID by GI due to frequent stooling, no BM today, 2BM on 1/11, will increase to TID dosing.   - increase Aldactone to 50mg daily (1/10) and continue with  Lasix 40mg BID. Cr increased from 1.14->1.2>1.33 on 1/12), ----> given Cr bump, will hold aldactone today. Lasix 40mg am dose already given-hold PM dose, IVF as below. F/u BMP in am)  -GI  consult appreicated  -Continue with PPI  - given LFT stable to improve, will f/u labs intermittently     Mild renal insufficieny  Cr 1.14->1.2>1.33 on 1/12  - holding aldactone, Lasix am dose given, hold evening dose.  - NS total 500cc at 50cc/hr. Cautions IVF in setting of cirrhosis and ascites  - f/u BMP in AM, consider resuming aldactone 25mg and possible decrease Lasix to daily if renal function stable    Depression/anxiety  - has not taken any meds for ~2 weeks.   - resumed Remeron, restarted Trazodone at lower dose of 50mg at bedtime, will increase to 100mg on 1/10 (PTA 200mg at bedtime)  - resume hydroxyzine prn q6hrs    Hypertension  - will continue Inderal,   - monitor blood pressure and renal function  - BMP in AM     Obstructive sleep apnea with CPAP  - CPAP per home setting     COPD  - continue prior to admission inhalers     Tobacco use/depdence;  - smokes 1ppd, counseled cessation. Nicotine patch while here    Hypokalemia  Hypophosphatemia  - replaced with Kphos 500mg orally with correction  - monitor    Generalized weakness  PT/OT recommend TCU. Patient reported to  that he wants to discharge home with home care. Discussed with patient again, he is in agreement for TCU discharge  -  following    Dysuria on 1/11  - UA negative for infection      DVT Prophylaxis: Pneumatic Compression Devices  Code Status: DNR/DNI    Disposition: Expected discharge in 1-2 day if renal function improving and able to go back on diuretics    Chely Calhoun MD  Text Page  (7am to 6pm)    Interval History   Denies abdominal pain, nausea or vomiting. Tmax 99.  Cr upto 1.33 this morning. Diuretics being held.     -Data reviewed today: I reviewed all new labs and imaging results over the last 24 hours. I personally reviewed no images or EKG's today.    Physical Exam   Temp: 99  F (37.2  C) Temp src: Oral BP: 100/61 Pulse: 50 Heart Rate: 65 Resp: 16 SpO2: 98 % O2 Device: None (Room air)    Vitals:    01/07/20 0748    Weight: 81.6 kg (180 lb)     Vital Signs with Ranges  Temp:  [98.4  F (36.9  C)-99  F (37.2  C)] 99  F (37.2  C)  Pulse:  [50] 50  Heart Rate:  [57-65] 65  Resp:  [16] 16  BP: (100-114)/(61-65) 100/61  SpO2:  [96 %-98 %] 98 %  I/O last 3 completed shifts:  In: -   Out: 450 [Urine:450]    Constitutional: Alert, awake,oriented today  Respiratory: Clear to auscultation bilaterally, no wheezing  Cardiovascular: regular rate and rhythm, b/l LE edema (L>R chronic)  GI: soft and non-tender, +distension  Skin/Integumen: warm and dry, some bruises on UE      Medications     sodium chloride 50 mL/hr at 01/12/20 1108       fluticasone-vilanterol  1 puff Inhalation Daily     folic acid  1 mg Oral Daily     lactulose  10 g Oral BID     lidocaine (buffered or not buffered)  5 mL Intradermal Once     mirtazapine  15 mg Oral At Bedtime     multivitamin w/minerals  1 tablet Oral Daily     nicotine  1 patch Transdermal Daily     nicotine   Transdermal Q8H     pantoprazole  40 mg Oral QAM AC     propranolol  10 mg Oral BID     sodium chloride (PF)  3 mL Intracatheter Q8H     traZODone  100 mg Oral At Bedtime       Data   Recent Labs   Lab 01/12/20  0607 01/11/20  0824 01/10/20  0807 01/09/20  0841 01/08/20  0813  01/07/20  0750   WBC  --   --   --  5.5 3.9*  --  4.8   HGB  --   --   --  9.7* 9.3*  --  9.4*   MCV  --   --   --  96 96  --  95   PLT  --   --   --  63* 58*  --  85*   INR  --   --   --   --   --   --  1.26*    136 133 137 134  --  136   POTASSIUM 3.9 3.6 3.5 3.4 3.2*  --  3.7   CHLORIDE 104 105 104 106 100  --  103   CO2 26 26 23 26 29  --  17*   BUN 33* 26 21 21 21  --  26   CR 1.33* 1.20 1.14 1.17 1.09  --  1.01   ANIONGAP 5 5 6 5 5  --  16*   KEV 8.3* 8.4* 8.3* 8.4* 8.2*  --  8.1*   GLC 99 118* 110* 126* 110*  --  72   ALBUMIN  --   --  2.3* 2.3* 2.3*   < > 2.7*   PROTTOTAL  --   --  6.1* 6.0* 6.0*  --  6.9   BILITOTAL  --   --  1.4* 2.0* 2.7*  --  4.0*   ALKPHOS  --   --  338* 356* 359*  --  423*   ALT  --   --   44 41 38  --  43   AST  --   --  111* 112* 111*  --  162*    < > = values in this interval not displayed.       No results found for this or any previous visit (from the past 24 hour(s)).

## 2020-01-12 NOTE — PLAN OF CARE
DATE & TIME: 1/11/2020 1900-0730  Cognitive Concerns/ Orientation: A&Ox4  BEHAVIOR & AGGRESSION TOOL COLOR: Green  CIWA SCORE: 1, 0, 1 for slight tremor  ABNL VS/O2: VSS; 90's RA  MOBILITY: SBA/GB/walker - ambulated in halls multiple times today  PAIN MANAGMENT: Denies  DIET: Regular   BOWEL/BLADDER: Incontinent at times; no stools overnight  ABNL LAB/BG: WNL  DRAIN/DEVICES: None  TELEMETRY RHYTHM: N/A  SKIN: Scattered bruising on upper and lower extremities, blanchable redness, dry. Jenifer area red - Barrier cream applied.   TESTS/PROCEDURES: None  D/C DAY/GOALS/PLACE: Discharge to TCU per PT/OT if continues to improve - patient now in agreement - SW following  OTHER IMPORTANT INFO:  Patient c/o burning with urination, UA/UC sent.  Nicotine patch to L arm.  NICOLE, LS diminished.  1-2+ BLE edema.  Abd distended, BS+.  C/o dry itchy eyes, artificial tears x1.

## 2020-01-12 NOTE — PLAN OF CARE
PT- Attempted to see pt this PM. Pt declined any ambulation at this time stating that he has been up for a walk x2 today with nursing and would like to rest at this time. Encouraged pt to keep ambulating with nursing staff.

## 2020-01-12 NOTE — PLAN OF CARE
DATE & TIME: 1/11/2020 7074-4893  Cognitive Concerns/ Orientation : A&Ox4  BEHAVIOR & AGGRESSION TOOL COLOR: Green  CIWA SCORE: 1, 1 and 3 for slight tremors and anxiety - Hydroxyzine given  ABNL VS/O2: VSS; 90's RA  MOBILITY: SBA/GB/walker - ambulated in halls multiple times today; up in chair for meals  PAIN MANAGMENT: Denies  DIET: Regular   BOWEL/BLADDER: Incontinent at times; has had 2 stools  ABNL LAB/BG: WNL  DRAIN/DEVICES: None  TELEMETRY RHYTHM: DC'd   SKIN: Scattered bruising on upper and lower extremities, blanchable redness, dry. Jenifer area red - Barrier cream applied.   TESTS/PROCEDURES: None  D/C DAY/GOALS/PLACE: Discharge to TCU per PT/OT if continues to improve - patient now in agreement - SW following  OTHER IMPORTANT INFO:  Patient was c/o burning with urination.  UA/UC was sent.

## 2020-01-13 ENCOUNTER — APPOINTMENT (OUTPATIENT)
Dept: PHYSICAL THERAPY | Facility: CLINIC | Age: 65
DRG: 897 | End: 2020-01-13
Payer: MEDICARE

## 2020-01-13 LAB
ANION GAP SERPL CALCULATED.3IONS-SCNC: 6 MMOL/L (ref 3–14)
BUN SERPL-MCNC: 37 MG/DL (ref 7–30)
CALCIUM SERPL-MCNC: 8.2 MG/DL (ref 8.5–10.1)
CHLORIDE SERPL-SCNC: 104 MMOL/L (ref 94–109)
CO2 SERPL-SCNC: 24 MMOL/L (ref 20–32)
CREAT SERPL-MCNC: 1.41 MG/DL (ref 0.66–1.25)
GFR SERPL CREATININE-BSD FRML MDRD: 52 ML/MIN/{1.73_M2}
GLUCOSE SERPL-MCNC: 117 MG/DL (ref 70–99)
POTASSIUM SERPL-SCNC: 4 MMOL/L (ref 3.4–5.3)
SODIUM SERPL-SCNC: 134 MMOL/L (ref 133–144)

## 2020-01-13 PROCEDURE — 12000000 ZZH R&B MED SURG/OB

## 2020-01-13 PROCEDURE — 36415 COLL VENOUS BLD VENIPUNCTURE: CPT | Performed by: INTERNAL MEDICINE

## 2020-01-13 PROCEDURE — 80048 BASIC METABOLIC PNL TOTAL CA: CPT | Performed by: INTERNAL MEDICINE

## 2020-01-13 PROCEDURE — 25000132 ZZH RX MED GY IP 250 OP 250 PS 637: Mod: GY | Performed by: INTERNAL MEDICINE

## 2020-01-13 PROCEDURE — 99232 SBSQ HOSP IP/OBS MODERATE 35: CPT | Performed by: HOSPITALIST

## 2020-01-13 PROCEDURE — 97116 GAIT TRAINING THERAPY: CPT | Mod: GP

## 2020-01-13 RX ADMIN — MULTIPLE VITAMINS W/ MINERALS TAB 1 TABLET: TAB at 08:49

## 2020-01-13 RX ADMIN — LACTULOSE 10 G: 10 SOLUTION ORAL at 22:14

## 2020-01-13 RX ADMIN — PROPRANOLOL HYDROCHLORIDE 10 MG: 10 TABLET ORAL at 08:49

## 2020-01-13 RX ADMIN — PROPRANOLOL HYDROCHLORIDE 10 MG: 10 TABLET ORAL at 22:14

## 2020-01-13 RX ADMIN — DEXTRAN 70 AND HYPROMELLOSE 2910 2 DROP: 1; 3 SOLUTION/ DROPS OPHTHALMIC at 08:48

## 2020-01-13 RX ADMIN — MIRTAZAPINE 15 MG: 15 TABLET, FILM COATED ORAL at 22:13

## 2020-01-13 RX ADMIN — LACTULOSE 10 G: 10 SOLUTION ORAL at 15:58

## 2020-01-13 RX ADMIN — TRAZODONE HYDROCHLORIDE 100 MG: 100 TABLET ORAL at 22:13

## 2020-01-13 RX ADMIN — DEXTRAN 70 AND HYPROMELLOSE 2910 2 DROP: 1; 3 SOLUTION/ DROPS OPHTHALMIC at 06:29

## 2020-01-13 RX ADMIN — HYDROXYZINE HYDROCHLORIDE 25 MG: 25 TABLET, FILM COATED ORAL at 10:09

## 2020-01-13 RX ADMIN — PANTOPRAZOLE SODIUM 40 MG: 40 TABLET, DELAYED RELEASE ORAL at 06:33

## 2020-01-13 RX ADMIN — FOLIC ACID 1 MG: 1 TABLET ORAL at 08:49

## 2020-01-13 RX ADMIN — DEXTRAN 70 AND HYPROMELLOSE 2910 2 DROP: 1; 3 SOLUTION/ DROPS OPHTHALMIC at 14:11

## 2020-01-13 RX ADMIN — LACTULOSE 10 G: 10 SOLUTION ORAL at 08:49

## 2020-01-13 RX ADMIN — NICOTINE 1 PATCH: 21 PATCH, EXTENDED RELEASE TRANSDERMAL at 18:41

## 2020-01-13 RX ADMIN — HYDROXYZINE HYDROCHLORIDE 25 MG: 25 TABLET, FILM COATED ORAL at 18:16

## 2020-01-13 RX ADMIN — FLUTICASONE FUROATE AND VILANTEROL TRIFENATATE 1 PUFF: 200; 25 POWDER RESPIRATORY (INHALATION) at 08:48

## 2020-01-13 ASSESSMENT — ACTIVITIES OF DAILY LIVING (ADL)
ADLS_ACUITY_SCORE: 19

## 2020-01-13 NOTE — PLAN OF CARE
DATE & TIME: 1/12/2020 1900-0730  Cognitive Concerns/ Orientation: A&Ox4  BEHAVIOR & AGGRESSION TOOL COLOR: Green  CIWA SCORE: discontinued  ABNL VS/O2: Temp 99.8, all other VSS, on RA  MOBILITY: SBA/GB/walker - ambulated in halls multiple times today  PAIN MANAGMENT: Denies  DIET: Regular   BOWEL/BLADDER: Incontinent at times; last BM yesterday - lactulose increased to TID  ABNL LAB/BG: Cr 1.33 - lasix discontinued; received NS @ 50cc/hr x 10hrs  DRAIN/DEVICES: PIV RFA - IVF infusing  TELEMETRY RHYTHM: N/A  SKIN: Scattered bruising on upper and lower extremities, blanchable redness, dry. Jenifer area red - Barrier cream applied.   TESTS/PROCEDURES: None  D/C DAY/GOALS/PLACE: Expected discharge in 1-2 day if renal function improving and able to go back on diuretics.  Patient is agreeable to TCU.  [facility] is full; declined by [facility] [employer]; [facility] -  following  OTHER IMPORTANT INFO:  Nicotine patch to Right arm.  1-2+ BLE edema.  Abd distended, BS+.  C/o dry itchy eyes, artificial tears given x1.

## 2020-01-13 NOTE — PLAN OF CARE
DATE & TIME: 01/13/2020 AM  Cognitive Concerns/ Orientation : AO X4, anxious   BEHAVIOR & AGGRESSION TOOL COLOR: Green  CIWA SCORE: N/A discontinued   ABNL VS/O2: VSS  MOBILITY: Ax1 gaitbelt and walker, ambulated in muniz twice this shift, up in chair  PAIN MANAGMENT: Denies pain  DIET: Regular diet tolerating well  BOWEL/BLADDER: up to bathroom  ABNL LAB/BG: N/A  DRAIN/DEVICES: L PIV SL  TELEMETRY RHYTHM: N/A  SKIN: bruised  TESTS/PROCEDURES: N/A  D/C DAY/GOALS/PLACE: Plan to discharge to TCU, pending  OTHER IMPORTANT INFO: Patient anxious managed with PRN oral atarax, nicotine patch in place, BLE edema, abd fullness

## 2020-01-13 NOTE — PLAN OF CARE
Discharge Planner PT   Patient plan for discharge: Home  Current status: Pt transferred sit to/from stand with CGA. Gait training 600' with FWW and CGA progressing to SBA. Pt declined trial of stairs at this time.  Following gait pt returned to sitting in chair, left with alarm on and needs in reach.   Barriers to return to prior living situation: Fall risk, Impaired balance and strength, level of assistance.   Recommendations for discharge: TCU per plan established by the PT.  Rationale for recommendations: Pt will need continued skilled PT at a TCU to achieve PLOF and independence.       Entered by: Yun Kingsley 01/13/2020 11:38 AM

## 2020-01-13 NOTE — PLAN OF CARE
DATE & TIME: 1/12/2020 5761-9460  Cognitive Concerns/ Orientation: A&Ox4  BEHAVIOR & AGGRESSION TOOL COLOR: Green  CIWA SCORE: 1, 1 for slight tremor - discontinued this pm  ABNL VS/O2: HR mandi at times; BP soft (100's-110's/60's); 90's RA  MOBILITY: SBA/GB/walker - ambulated in halls multiple times today  PAIN MANAGMENT: Denies  DIET: Regular   BOWEL/BLADDER: Incontinent at times; last BM yesterday - lactulose increased to TID  ABNL LAB/BG: Cr 1.33 - lasix discontinued; receiving NS @ 50cc/hr x 10hrs  DRAIN/DEVICES: PIV RFA - IVF infusing  TELEMETRY RHYTHM: N/A  SKIN: Scattered bruising on upper and lower extremities, blanchable redness, dry. Jenifer area red - Barrier cream applied.   TESTS/PROCEDURES: None  D/C DAY/GOALS/PLACE: Expected discharge in 1-2 day if renal function improving and able to go back on diuretics.  Patient is agreeable to TCU.  [facility] is full; declined by [facility] [employer]; referrals out to [facility] and [facility] -  following  OTHER IMPORTANT INFO:  Nicotine patch to Left arm.  1-2+ BLE edema.  Abd distended, BS+.  C/o dry itchy eyes, artificial tears given x2.

## 2020-01-13 NOTE — PROGRESS NOTES
[facility]  Hospitalist Progress Note        Piter Sequeira, DO  01/13/2020        Interval History:      Patient states he is feeling progressively better, concerned about disposition planning, questions answered and patient updated on plan of care.          Assessment and Plan:        Jim Richard is a 64 year old male who with past medical history of depression, anxiety, hypertension, COPD, sleep apnea on CPAP, alcoholism, alcoholic liver disease/cirrhosis with ascites who is being admitted for alcohol withdrawal.     Alcohol withdrawal with DT History of alcohol dependence/alcoholism Endorses drinking 0.75L Vodka/day in the last 2 weeks. Last drink 3pm on 1/6/19. ETOH level in the ED 0.08.  Has tremors on exam. Per chart review, had been in hospital for withdrawal with DT in 10/2019. No known withdrawal seizures.  He wants to quit drinking alcohol. Has been disoriented and intermittently hallucinating. On 1/10, he overall appears improved.On 1/11, he is more clear, oriented.   - Hydroxyzine prn for anxiety  - Multivitamin, Folic Acid and thiamine  - appreciate psych consultation  - CD consult, recommend Rule 25     Alcoholic liver disease/cirrhosis, likely superimposed mild alcoholic hepatitis Ascites History of esophageal varices Paracentesis was performed on 12/ 31/2019 with removal of 5 L.  His laboratory shows bilirubin of 4, , ALT 43.  Ammonia is 53.  INR is 1.26 on admission.  Bilirubin down trended to 1.4 and AST to 111 on recheck. s/p paracentesis with removal of 4.3L on 1/7/20. Fluid analysis not suggestive of SBP, culture no growth to date  - Continue with lactulose  - hold aldactone today and Lasix   - GI consult appreciated  - Continue with PPI     Mild renal insufficieny Cr 1.14->1.2>1.33 on 1/12  - holding aldactone, Lasix   - f/u BMP in AM     Depression/anxiety has not taken any meds for ~2 weeks.   - resumed Remeron, restarted Trazodone at lower dose of 50mg  "at bedtime, will increase to 100mg on 1/10 (PTA 200mg at bedtime)  - resume hydroxyzine prn q6hrs     Hypertension  - will continue Inderal     Obstructive sleep apnea with CPAP  - CPAP per home setting     COPD  - continue prior to admission inhalers     Tobacco use/depdence;  - smokes 1ppd, counseled cessation. Nicotine patch while here     Hypokalemia  Hypophosphatemia  - monitor     Generalized weakness  PT/OT recommend TCU. Patient reported to  that he wants to discharge home with home care. Discussed with patient again, he is in agreement for TCU discharge  -  following     Dysuria on   - UA negative for infection      DVT Prophylaxis: Pneumatic Compression Devices  Code Status: DNR/DNI     Disposition: Expected discharge in 1-2 day if renal function improving                   Physical Exam:      Heart Rate: 59    Blood pressure 123/78, pulse 68, temperature 98.9  F (37.2  C), temperature source Oral, resp. rate 16, height 1.702 m (5' 7\"), weight 81.6 kg (180 lb), SpO2 99 %.    Vitals:    20 0748   Weight: 81.6 kg (180 lb)       Vital Sign Ranges  Temperature Temp  Av  F (37.2  C)  Min: 98  F (36.7  C)  Max: 99.8  F (37.7  C)   Blood pressure Systolic (24hrs), Av , Min:106 , Max:123        Diastolic (24hrs), Av, Min:60, Max:78      Pulse Pulse  Av  Min: 68  Max: 68   Respirations Resp  Av  Min: 16  Max: 16   Pulse oximetry SpO2  Av.3 %  Min: 96 %  Max: 99 %     Vital Signs with Ranges  Temp:  [98  F (36.7  C)-99.8  F (37.7  C)] 98.9  F (37.2  C)  Pulse:  [68] 68  Heart Rate:  [53-62] 59  Resp:  [16] 16  BP: (106-123)/(60-78) 123/78  SpO2:  [96 %-99 %] 99 %    I/O Last 3 Shifts:   I/O last 3 completed shifts:  In: -   Out: 760 [Urine:760]    I/O past 24 hours:     Intake/Output Summary (Last 24 hours) at 2020 0846  Last data filed at 2020 0826  Gross per 24 hour   Intake 240 ml   Output 760 ml   Net -520 ml     GENERAL: Alert and oriented. NAD. " Conversational, appropriate.   HEENT: Normocephalic. EOMI. No icterus or injection. Nares normal.   LUNGS: Clear to auscultation. No dyspnea at rest.   HEART: Regular rate. Extremities perfused.   ABDOMEN: Soft, nontender, and distended. Positive bowel sounds.   EXTREMITIES: No LE edema noted.   NEUROLOGIC: Moves extremities x4, follows commands.          Prior to Admission Medications:        Medications Prior to Admission   Medication Sig Dispense Refill Last Dose     acamprosate (CAMPRAL) 333 MG EC tablet Take 666 mg by mouth 3 times daily   Past Month at Unknown time     albuterol (PROAIR HFA/PROVENTIL HFA/VENTOLIN HFA) 108 (90 BASE) MCG/ACT Inhaler Inhale 2 puffs into the lungs every 6 hours as needed    prn     DULoxetine (CYMBALTA) 60 MG capsule Take 1 capsule (60 mg) by mouth daily 30 capsule 0 Past Month at Unknown time     fluticasone-vilanterol (BREO ELLIPTA) 200-25 MCG/INH oral inhaler Inhale 1 puff into the lungs daily    1/6/2020 at Unknown time     folic acid (FOLVITE) 1 MG tablet Take 1 tablet (1 mg) by mouth daily 30 tablet 0 Past Month at Unknown time     furosemide (LASIX) 40 MG tablet Take 1 tablet (40 mg) by mouth daily 30 tablet 0 Past Month at Unknown time     hydrOXYzine (VISTARIL) 50 MG capsule Take 50 mg by mouth 3 times daily as needed for itching   prn     lactulose (CHRONULAC) 10 GM/15ML solution Take 20 g by mouth 2 times daily   Past Month at Unknown time     mirtazapine (REMERON) 15 MG tablet Take 1 tablet (15 mg) by mouth At Bedtime 30 tablet 0 Past Month at Unknown time     multivitamin, therapeutic with minerals (THERA-VIT-M) TABS tablet Take 1 tablet by mouth daily 30 each 0 Past Month at Unknown time     nicotine (NICODERM CQ) 21 MG/24HR 24 hr patch Place 1 patch onto the skin daily 7 patch 0 1/6/2020 at Unsure if has one on     propranolol (INDERAL) 10 MG tablet Take 1 tablet (10 mg) by mouth 2 times daily (Patient taking differently: Take 10 mg by mouth 2 times daily Patient  thinks he may be taking once daily but unsure) 60 tablet 0 Past Month at Unknown time     QUEtiapine (SEROQUEL) 50 MG tablet Take 1 tablet (50 mg) by mouth At Bedtime 30 tablet 0 Past Month at Unknown time     spironolactone (ALDACTONE) 25 MG tablet Take 2 tablets (50 mg) by mouth daily (Patient taking differently: Take 25 mg by mouth daily Rx written 50mg daily, but he believes he's taking 1/day) 30 tablet 0 Past Month at Unknown time     tamsulosin (FLOMAX) 0.4 MG capsule Take 0.4 mg by mouth daily Was discontinued at last admission in 10/2019, but still taking per patient.   Past Month at Unknown time     traZODone (DESYREL) 100 MG tablet Take 2 tablets (200 mg) by mouth At Bedtime 60 tablet 0 Past Month at Unknown time     vitamin B1 (THIAMINE) 100 MG tablet Take 1 tablet (100 mg) by mouth daily 30 tablet 0 Past Month at Unknown time     order for DME Equipment being ordered: Walker Wheels () and Walker ()  Treatment Diagnosis: difficulty walking 1 each 0      pantoprazole (PROTONIX) 40 MG EC tablet Take 1 tablet (40 mg) by mouth every morning (before breakfast) 30 tablet 0             Medications:        Current Facility-Administered Medications   Medication Last Rate     Current Facility-Administered Medications   Medication Dose Route Frequency     fluticasone-vilanterol  1 puff Inhalation Daily     folic acid  1 mg Oral Daily     lactulose  10 g Oral TID     mirtazapine  15 mg Oral At Bedtime     multivitamin w/minerals  1 tablet Oral Daily     nicotine  1 patch Transdermal Daily     nicotine   Transdermal Q8H     pantoprazole  40 mg Oral QAM AC     propranolol  10 mg Oral BID     sodium chloride (PF)  3 mL Intracatheter Q8H     traZODone  100 mg Oral At Bedtime     Current Facility-Administered Medications   Medication Dose Route Frequency     benztropine  1-2 mg Oral TID PRN     haloperidol lactate  1-2 mg Intravenous Q6H PRN     hydrOXYzine  25 mg Oral Q6H PRN     hypromellose-dextran  2 drop  Ophthalmic Q1H PRN     lidocaine 4%   Topical Q1H PRN     lidocaine (buffered or not buffered)  0.1-1 mL Other Q1H PRN     LORazepam  1-2 mg Oral Q30 Min PRN    Or     LORazepam  1-2 mg Intravenous Q30 Min PRN     magnesium sulfate  4 g Intravenous Q4H PRN     melatonin  1 mg Oral At Bedtime PRN     naloxone  0.1-0.4 mg Intravenous Q2 Min PRN     ondansetron  4 mg Oral Q6H PRN    Or     ondansetron  4 mg Intravenous Q6H PRN     prochlorperazine  5 mg Intravenous Q6H PRN    Or     prochlorperazine  5 mg Oral Q6H PRN    Or     prochlorperazine  12.5 mg Rectal Q12H PRN     senna-docusate  1 tablet Oral BID PRN    Or     senna-docusate  2 tablet Oral BID PRN     sodium chloride (PF)  3 mL Intracatheter q1 min prn            Data:      Lab data reviewed.   Recent Labs   Lab 01/13/20  0003 01/12/20  0607 01/11/20  0824  01/09/20  0841 01/08/20  0813 01/07/20  0750   HGB  --   --   --   --  9.7* 9.3* 9.4*   MCV  --   --   --   --  96 96 95   PLT  --   --   --   --  63* 58* 85*   INR  --   --   --   --   --   --  1.26*    135 136   < > 137 134 136   POTASSIUM 4.0 3.9 3.6   < > 3.4 3.2* 3.7   CHLORIDE 104 104 105   < > 106 100 103   CO2 24 26 26   < > 26 29 17*   BUN 37* 33* 26   < > 21 21 26   CR 1.41* 1.33* 1.20   < > 1.17 1.09 1.01   ANIONGAP 6 5 5   < > 5 5 16*   KEV 8.2* 8.3* 8.4*   < > 8.4* 8.2* 8.1*   * 99 118*   < > 126* 110* 72    < > = values in this interval not displayed.           Imaging:      Imaging data reviewed.     Dr. Piter Sequeira D.O.  [facility]  Pager [phone]

## 2020-01-13 NOTE — PROGRESS NOTES
"SPIRITUAL HEALTH SERVICES Progress Note  [facility]    Visit with pt, per his lengthy hospital stay.  Pt briefly talked about his situation, saying, \"I have many medical problems that I brought on myself.\"  He is anxious about going to a TCU, noting that he'd rather be going home.  However, he hopes to get stronger so he can eventually get back to his town home.  Pt said he used to attend [facility]'s [employer] regularly.  He declined my offer of a visit from our , but said he would like prayer (from me).  Engaged in reflective listening and provided support and prayer.    team available if additional needs arise.                                                                                                                                                   Patricia Gaffney M.A.  Staff   Pager [phone]  Phone [phone]      "

## 2020-01-14 ENCOUNTER — APPOINTMENT (OUTPATIENT)
Dept: OCCUPATIONAL THERAPY | Facility: CLINIC | Age: 65
DRG: 897 | End: 2020-01-14
Payer: MEDICARE

## 2020-01-14 ENCOUNTER — APPOINTMENT (OUTPATIENT)
Dept: PHYSICAL THERAPY | Facility: CLINIC | Age: 65
DRG: 897 | End: 2020-01-14
Payer: MEDICARE

## 2020-01-14 LAB
ALBUMIN SERPL-MCNC: 2.8 G/DL (ref 3.4–5)
ALP SERPL-CCNC: 307 U/L (ref 40–150)
ALT SERPL W P-5'-P-CCNC: 57 U/L (ref 0–70)
ANION GAP SERPL CALCULATED.3IONS-SCNC: 6 MMOL/L (ref 3–14)
AST SERPL W P-5'-P-CCNC: 78 U/L (ref 0–45)
BASOPHILS # BLD AUTO: 0 10E9/L (ref 0–0.2)
BASOPHILS NFR BLD AUTO: 0.8 %
BILIRUB SERPL-MCNC: 1.2 MG/DL (ref 0.2–1.3)
BUN SERPL-MCNC: 34 MG/DL (ref 7–30)
CALCIUM SERPL-MCNC: 8.5 MG/DL (ref 8.5–10.1)
CHLORIDE SERPL-SCNC: 105 MMOL/L (ref 94–109)
CO2 SERPL-SCNC: 24 MMOL/L (ref 20–32)
CREAT SERPL-MCNC: 1.22 MG/DL (ref 0.66–1.25)
DIFFERENTIAL METHOD BLD: ABNORMAL
EOSINOPHIL # BLD AUTO: 0 10E9/L (ref 0–0.7)
EOSINOPHIL NFR BLD AUTO: 1.1 %
ERYTHROCYTE [DISTWIDTH] IN BLOOD BY AUTOMATED COUNT: 15.8 % (ref 10–15)
GFR SERPL CREATININE-BSD FRML MDRD: 61 ML/MIN/{1.73_M2}
GLUCOSE SERPL-MCNC: 104 MG/DL (ref 70–99)
HCT VFR BLD AUTO: 30.5 % (ref 40–53)
HGB BLD-MCNC: 9.9 G/DL (ref 13.3–17.7)
IMM GRANULOCYTES # BLD: 0 10E9/L (ref 0–0.4)
IMM GRANULOCYTES NFR BLD: 0.3 %
LYMPHOCYTES # BLD AUTO: 0.9 10E9/L (ref 0.8–5.3)
LYMPHOCYTES NFR BLD AUTO: 22.8 %
MCH RBC QN AUTO: 31.8 PG (ref 26.5–33)
MCHC RBC AUTO-ENTMCNC: 32.5 G/DL (ref 31.5–36.5)
MCV RBC AUTO: 98 FL (ref 78–100)
MONOCYTES # BLD AUTO: 0.4 10E9/L (ref 0–1.3)
MONOCYTES NFR BLD AUTO: 11.5 %
NEUTROPHILS # BLD AUTO: 2.4 10E9/L (ref 1.6–8.3)
NEUTROPHILS NFR BLD AUTO: 63.5 %
NRBC # BLD AUTO: 0 10*3/UL
NRBC BLD AUTO-RTO: 0 /100
PLATELET # BLD AUTO: 143 10E9/L (ref 150–450)
POTASSIUM SERPL-SCNC: 3.8 MMOL/L (ref 3.4–5.3)
PROT SERPL-MCNC: 7 G/DL (ref 6.8–8.8)
RBC # BLD AUTO: 3.11 10E12/L (ref 4.4–5.9)
SODIUM SERPL-SCNC: 135 MMOL/L (ref 133–144)
WBC # BLD AUTO: 3.7 10E9/L (ref 4–11)

## 2020-01-14 PROCEDURE — 97535 SELF CARE MNGMENT TRAINING: CPT | Mod: GO | Performed by: OCCUPATIONAL THERAPY ASSISTANT

## 2020-01-14 PROCEDURE — 99207 ZZC CDG-MDM COMPONENT: MEETS MODERATE - UP CODED: CPT | Performed by: HOSPITALIST

## 2020-01-14 PROCEDURE — 80053 COMPREHEN METABOLIC PANEL: CPT | Performed by: HOSPITALIST

## 2020-01-14 PROCEDURE — 12000000 ZZH R&B MED SURG/OB

## 2020-01-14 PROCEDURE — 99232 SBSQ HOSP IP/OBS MODERATE 35: CPT | Performed by: HOSPITALIST

## 2020-01-14 PROCEDURE — 36415 COLL VENOUS BLD VENIPUNCTURE: CPT | Performed by: HOSPITALIST

## 2020-01-14 PROCEDURE — 97530 THERAPEUTIC ACTIVITIES: CPT | Mod: GO | Performed by: OCCUPATIONAL THERAPY ASSISTANT

## 2020-01-14 PROCEDURE — 97116 GAIT TRAINING THERAPY: CPT | Mod: GP

## 2020-01-14 PROCEDURE — 25000132 ZZH RX MED GY IP 250 OP 250 PS 637: Mod: GY | Performed by: HOSPITALIST

## 2020-01-14 PROCEDURE — 25000132 ZZH RX MED GY IP 250 OP 250 PS 637: Mod: GY | Performed by: INTERNAL MEDICINE

## 2020-01-14 PROCEDURE — 85025 COMPLETE CBC W/AUTO DIFF WBC: CPT | Performed by: HOSPITALIST

## 2020-01-14 RX ORDER — LORAZEPAM 0.5 MG/1
.25-.5 TABLET ORAL EVERY 4 HOURS PRN
Status: DISCONTINUED | OUTPATIENT
Start: 2020-01-14 | End: 2020-01-16 | Stop reason: HOSPADM

## 2020-01-14 RX ADMIN — FLUTICASONE FUROATE AND VILANTEROL TRIFENATATE 1 PUFF: 200; 25 POWDER RESPIRATORY (INHALATION) at 08:34

## 2020-01-14 RX ADMIN — LORAZEPAM 0.5 MG: 0.5 TABLET ORAL at 20:35

## 2020-01-14 RX ADMIN — TRAZODONE HYDROCHLORIDE 100 MG: 100 TABLET ORAL at 21:29

## 2020-01-14 RX ADMIN — LORAZEPAM 0.5 MG: 0.5 TABLET ORAL at 16:33

## 2020-01-14 RX ADMIN — DEXTRAN 70 AND HYPROMELLOSE 2910 2 DROP: 1; 3 SOLUTION/ DROPS OPHTHALMIC at 13:17

## 2020-01-14 RX ADMIN — PANTOPRAZOLE SODIUM 40 MG: 40 TABLET, DELAYED RELEASE ORAL at 06:46

## 2020-01-14 RX ADMIN — MIRTAZAPINE 15 MG: 15 TABLET, FILM COATED ORAL at 21:29

## 2020-01-14 RX ADMIN — DEXTRAN 70 AND HYPROMELLOSE 2910 2 DROP: 1; 3 SOLUTION/ DROPS OPHTHALMIC at 18:55

## 2020-01-14 RX ADMIN — LACTULOSE 10 G: 10 SOLUTION ORAL at 08:33

## 2020-01-14 RX ADMIN — FOLIC ACID 1 MG: 1 TABLET ORAL at 08:34

## 2020-01-14 RX ADMIN — LACTULOSE 10 G: 10 SOLUTION ORAL at 21:29

## 2020-01-14 RX ADMIN — LACTULOSE 10 G: 10 SOLUTION ORAL at 15:50

## 2020-01-14 RX ADMIN — HYDROXYZINE HYDROCHLORIDE 25 MG: 25 TABLET, FILM COATED ORAL at 09:26

## 2020-01-14 RX ADMIN — MULTIPLE VITAMINS W/ MINERALS TAB 1 TABLET: TAB at 08:34

## 2020-01-14 RX ADMIN — NICOTINE 1 PATCH: 21 PATCH, EXTENDED RELEASE TRANSDERMAL at 21:29

## 2020-01-14 ASSESSMENT — ACTIVITIES OF DAILY LIVING (ADL)
ADLS_ACUITY_SCORE: 19
ADLS_ACUITY_SCORE: 19
ADLS_ACUITY_SCORE: 20
ADLS_ACUITY_SCORE: 19
ADLS_ACUITY_SCORE: 20
ADLS_ACUITY_SCORE: 20

## 2020-01-14 NOTE — PROGRESS NOTES
"Jame, brother calls for update. Per patient, \"okay to update brother.\" Jame updated. He would like a call from Social Work with placement update. He can be reached at [phone].  "

## 2020-01-14 NOTE — PLAN OF CARE
DATE & TIME: 01/13/2020 3615-1171  Cognitive Concerns/ Orientation : A&O X4, anxious   BEHAVIOR & AGGRESSION TOOL COLOR: Green  CIWA SCORE: NA         ABNL VS/O2: VSS  MOBILITY: Ax1 gb&walker, ambulated in muniz x2 during shift  PAIN MANAGMENT: Denies pain  DIET: Regular diet tolerating well  BOWEL/BLADDER: continent of B/B. up to BRM.   ABNL LAB/BG: NA  DRAIN/DEVICES: L PIV SL  TELEMETRY RHYTHM: NA  SKIN: bruised on upper extrem.   TESTS/PROCEDURES: NA  D/C DAY/GOALS/PLACE: Plan to discharge to TCU; pending  OTHER IMPORTANT INFO: Patient anxious managed with PRN oral atarax 1x, nicotine patch in place, BLE edema, abd fullness

## 2020-01-14 NOTE — PLAN OF CARE
Discharge Planner OT   Patient plan for discharge:  home  Current status: pt in the bathroom at sink when OT arrived for session, pt completed at sink ADLS independent, pt able to amb in room without AD SBA, completed clothing retrieval SBA for balance, pt able to don LE dressing with SBA for balance only while pulling up pants.   Barriers to return to prior living situation: Current level of assist, weakness, impaired cognition/safety, falls risk  Recommendations for discharge: TCU per plan established by the Occupational Therapist  Rationale for recommendations: Pt. would benefit from continued skilled OT services to assist pt. in achieving maximal safety/indep. W/ fx.transfers/ADL's       Entered by: Krista Dolan 01/14/2020 8:36 AM

## 2020-01-14 NOTE — PROGRESS NOTES
SW:  D:  Patient is medically stable for discharge per Dr Sequeira.  Patient was offered a TCU at [facility].  Patient is declining TCU stating that he has been at three facilities and has one hour of therapy per day and sits the rest of the day.  He feels he is doing well enough to return home.  He does have a house mate but she works during the day.  He plans to have his house mate pick him up tomorrow morning.  This is the same pattern that occurred the last time writer worked with patient. By the time he is medically stable to leave he feels he is strong enough to go home.  PLAN: Will see patient in the morning.

## 2020-01-14 NOTE — PROVIDER NOTIFICATION
MD Notification    Notified Person: MD    Notified Person Name: Jacek Sequeira    Notification Date/Time: 1/14/2020 at 1601    Notification Interaction: Web page    Purpose of Notification: PRN atarax discontinued earlier today but patient states this helps manage his anxiety. Patient currently anxious and requesting atarax. Please reorder if appropriate.    Orders Received: See order for PRN ativan PO.    Comments:

## 2020-01-14 NOTE — PLAN OF CARE
DATE & TIME: 01/14/2020 Night  Cognitive Concerns/ Orientation : A&O X4, anxious   BEHAVIOR & AGGRESSION TOOL COLOR: Green  CIWA SCORE: NA         ABNL VS/O2: VSS  MOBILITY: Ax1 gb&walker, ambulated in muniz  PAIN MANAGMENT: Denies pain  DIET: Regular diet tolerating well  BOWEL/BLADDER: continent of B/B. up to BRM.   ABNL LAB/BG: NA  DRAIN/DEVICES: L PIV SL  TELEMETRY RHYTHM: NA  SKIN: bruised on upper extrem.   TESTS/PROCEDURES: NA  D/C DAY/GOALS/PLACE: Plan to discharge to TCU; pending  OTHER IMPORTANT INFO: Patient anxious managed with PRN oral atarax 1x, nicotine patch in place, BLE edema, abd fullness

## 2020-01-14 NOTE — PROVIDER NOTIFICATION
MD Notification    Notified Person: MD    Notified Person Name: Dr. Sequeira    Notification Date/Time: 1/14/2020 at 0822    Notification Interaction: Web page    Purpose of Notification: HR 56. Okay for scheduled propranolol? Please add parameters to med.    Orders Received: Per MD, hold this dose. MD to add parameters to med.    Comments:

## 2020-01-14 NOTE — PROGRESS NOTES
[facility]  Hospitalist Progress Note        Piter Sequeira, DO  01/14/2020        Interval History:      Patient awaiting placement options for TCU, states he is doing better daily.          Assessment and Plan:        Jim Richard is a 64 year old male who with past medical history of depression, anxiety, hypertension, COPD, sleep apnea on CPAP, alcoholism, alcoholic liver disease/cirrhosis with ascites who is being admitted for alcohol withdrawal.     Alcohol withdrawal with DT History of alcohol dependence/alcoholism Endorses drinking 0.75L Vodka/day in the last 2 weeks. Last drink 3pm on 1/6/19. ETOH level in the ED 0.08.  Has tremors on exam. Per chart review, had been in hospital for withdrawal with DT in 10/2019. No known withdrawal seizures.  He wants to quit drinking alcohol. Has been disoriented and intermittently hallucinating. On 1/10, he overall appears improved.On 1/11, he is more clear, oriented.   - Hydroxyzine prn for anxiety  - Multivitamin, Folic Acid and thiamine  - appreciate psych consultation  - CD consult, recommend Rule 25     Alcoholic liver disease/cirrhosis, likely superimposed mild alcoholic hepatitis Ascites History of esophageal varices Paracentesis was performed on 12/ 31/2019 with removal of 5 L.  His laboratory shows bilirubin of 4, , ALT 43.  Ammonia is 53.  INR is 1.26 on admission.  Bilirubin down trended to 1.4 and AST to 111 on recheck. s/p paracentesis with removal of 4.3L on 1/7/20. Fluid analysis not suggestive of SBP, culture no growth to date  - Continue with lactulose  - hold aldactone today and Lasix   - GI consult appreciated  - Continue with PPI     Mild renal insufficieny Cr 1.14->1.2>1.33 on 1/12  - holding aldactone, Lasix   - f/u BMP in AM     Depression/anxiety has not taken any meds for ~2 weeks.   - resumed Remeron, restarted Trazodone at lower dose of 50mg at bedtime, will increase to 100mg on 1/10 (PTA 200mg at bedtime)  -  "resume hydroxyzine prn q6hrs     Hypertension  - will continue Inderal     Obstructive sleep apnea with CPAP  - CPAP per home setting     COPD  - continue prior to admission inhalers     Tobacco use/depdence;  - smokes 1ppd, counseled cessation. Nicotine patch while here     Hypokalemia  Hypophosphatemia  - monitor     Generalized weakness  PT/OT recommend TCU. Patient reported to  that he wants to discharge home with home care. Discussed with patient again, he is in agreement for TCU discharge  -  following     Dysuria on   - UA negative for infection      DVT Prophylaxis: Pneumatic Compression Devices  Code Status: DNR/DNI     Disposition: Expected discharge pending placement verification.                    Physical Exam:      Heart Rate: 56    Blood pressure 108/64, pulse 58, temperature 97.9  F (36.6  C), temperature source Oral, resp. rate 16, height 1.702 m (5' 7\"), weight 81.6 kg (180 lb), SpO2 99 %.    Vitals:    20 0748   Weight: 81.6 kg (180 lb)       Vital Sign Ranges  Temperature Temp  Av.1  F (37.3  C)  Min: 97.9  F (36.6  C)  Max: 99.8  F (37.7  C)   Blood pressure Systolic (24hrs), Av , Min:108 , Max:124        Diastolic (24hrs), Av, Min:64, Max:67      Pulse Pulse  Av  Min: 58  Max: 58   Respirations Resp  Av  Min: 16  Max: 16   Pulse oximetry SpO2  Av.7 %  Min: 96 %  Max: 99 %     Vital Signs with Ranges  Temp:  [97.9  F (36.6  C)-99.8  F (37.7  C)] 97.9  F (36.6  C)  Pulse:  [58] 58  Heart Rate:  [55-56] 56  Resp:  [16] 16  BP: (108-124)/(64-67) 108/64  SpO2:  [96 %-99 %] 99 %    I/O Last 3 Shifts:   I/O last 3 completed shifts:  In: 480 [P.O.:480]  Out: -     I/O past 24 hours:     Intake/Output Summary (Last 24 hours) at 2020 0904  Last data filed at 2020 0734  Gross per 24 hour   Intake 480 ml   Output --   Net 480 ml     GENERAL: Alert and oriented. NAD. Conversational, appropriate. Pleasant.   HEENT: Normocephalic. EOMI. No icterus or " injection. Nares normal.   LUNGS: Clear to auscultation. No dyspnea at rest.   HEART: Regular rate. Extremities perfused.   ABDOMEN: Soft, nontender, and distended. Positive bowel sounds.   EXTREMITIES: No LE edema noted.   NEUROLOGIC: Moves extremities x4, follows commands.          Prior to Admission Medications:        Medications Prior to Admission   Medication Sig Dispense Refill Last Dose     acamprosate (CAMPRAL) 333 MG EC tablet Take 666 mg by mouth 3 times daily   Past Month at Unknown time     albuterol (PROAIR HFA/PROVENTIL HFA/VENTOLIN HFA) 108 (90 BASE) MCG/ACT Inhaler Inhale 2 puffs into the lungs every 6 hours as needed    prn     DULoxetine (CYMBALTA) 60 MG capsule Take 1 capsule (60 mg) by mouth daily 30 capsule 0 Past Month at Unknown time     fluticasone-vilanterol (BREO ELLIPTA) 200-25 MCG/INH oral inhaler Inhale 1 puff into the lungs daily    1/6/2020 at Unknown time     folic acid (FOLVITE) 1 MG tablet Take 1 tablet (1 mg) by mouth daily 30 tablet 0 Past Month at Unknown time     furosemide (LASIX) 40 MG tablet Take 1 tablet (40 mg) by mouth daily 30 tablet 0 Past Month at Unknown time     hydrOXYzine (VISTARIL) 50 MG capsule Take 50 mg by mouth 3 times daily as needed for itching   prn     lactulose (CHRONULAC) 10 GM/15ML solution Take 20 g by mouth 2 times daily   Past Month at Unknown time     mirtazapine (REMERON) 15 MG tablet Take 1 tablet (15 mg) by mouth At Bedtime 30 tablet 0 Past Month at Unknown time     multivitamin, therapeutic with minerals (THERA-VIT-M) TABS tablet Take 1 tablet by mouth daily 30 each 0 Past Month at Unknown time     nicotine (NICODERM CQ) 21 MG/24HR 24 hr patch Place 1 patch onto the skin daily 7 patch 0 1/6/2020 at Unsure if has one on     propranolol (INDERAL) 10 MG tablet Take 1 tablet (10 mg) by mouth 2 times daily (Patient taking differently: Take 10 mg by mouth 2 times daily Patient thinks he may be taking once daily but unsure) 60 tablet 0 Past Month at  Unknown time     QUEtiapine (SEROQUEL) 50 MG tablet Take 1 tablet (50 mg) by mouth At Bedtime 30 tablet 0 Past Month at Unknown time     spironolactone (ALDACTONE) 25 MG tablet Take 2 tablets (50 mg) by mouth daily (Patient taking differently: Take 25 mg by mouth daily Rx written 50mg daily, but he believes he's taking 1/day) 30 tablet 0 Past Month at Unknown time     tamsulosin (FLOMAX) 0.4 MG capsule Take 0.4 mg by mouth daily Was discontinued at last admission in 10/2019, but still taking per patient.   Past Month at Unknown time     traZODone (DESYREL) 100 MG tablet Take 2 tablets (200 mg) by mouth At Bedtime 60 tablet 0 Past Month at Unknown time     vitamin B1 (THIAMINE) 100 MG tablet Take 1 tablet (100 mg) by mouth daily 30 tablet 0 Past Month at Unknown time     order for DME Equipment being ordered: Walker Wheels () and Walker ()  Treatment Diagnosis: difficulty walking 1 each 0      pantoprazole (PROTONIX) 40 MG EC tablet Take 1 tablet (40 mg) by mouth every morning (before breakfast) 30 tablet 0             Medications:        Current Facility-Administered Medications   Medication Last Rate     Current Facility-Administered Medications   Medication Dose Route Frequency     fluticasone-vilanterol  1 puff Inhalation Daily     folic acid  1 mg Oral Daily     lactulose  10 g Oral TID     mirtazapine  15 mg Oral At Bedtime     multivitamin w/minerals  1 tablet Oral Daily     nicotine  1 patch Transdermal Daily     nicotine   Transdermal Q8H     pantoprazole  40 mg Oral QAM AC     propranolol  10 mg Oral BID     sodium chloride (PF)  3 mL Intracatheter Q8H     traZODone  100 mg Oral At Bedtime     Current Facility-Administered Medications   Medication Dose Route Frequency     benztropine  1-2 mg Oral TID PRN     haloperidol lactate  1-2 mg Intravenous Q6H PRN     hydrOXYzine  25 mg Oral Q6H PRN     hypromellose-dextran  2 drop Ophthalmic Q1H PRN     lidocaine 4%   Topical Q1H PRN     lidocaine  (buffered or not buffered)  0.1-1 mL Other Q1H PRN     LORazepam  1-2 mg Oral Q30 Min PRN    Or     LORazepam  1-2 mg Intravenous Q30 Min PRN     magnesium sulfate  4 g Intravenous Q4H PRN     melatonin  1 mg Oral At Bedtime PRN     naloxone  0.1-0.4 mg Intravenous Q2 Min PRN     ondansetron  4 mg Oral Q6H PRN    Or     ondansetron  4 mg Intravenous Q6H PRN     prochlorperazine  5 mg Intravenous Q6H PRN    Or     prochlorperazine  5 mg Oral Q6H PRN    Or     prochlorperazine  12.5 mg Rectal Q12H PRN     senna-docusate  1 tablet Oral BID PRN    Or     senna-docusate  2 tablet Oral BID PRN     sodium chloride (PF)  3 mL Intracatheter q1 min prn            Data:      Lab data reviewed.   Recent Labs   Lab 01/13/20  0003 01/12/20  0607 01/11/20  0824  01/09/20  0841 01/08/20  0813   HGB  --   --   --   --  9.7* 9.3*   MCV  --   --   --   --  96 96   PLT  --   --   --   --  63* 58*    135 136   < > 137 134   POTASSIUM 4.0 3.9 3.6   < > 3.4 3.2*   CHLORIDE 104 104 105   < > 106 100   CO2 24 26 26   < > 26 29   BUN 37* 33* 26   < > 21 21   CR 1.41* 1.33* 1.20   < > 1.17 1.09   ANIONGAP 6 5 5   < > 5 5   KEV 8.2* 8.3* 8.4*   < > 8.4* 8.2*   * 99 118*   < > 126* 110*    < > = values in this interval not displayed.           Imaging:      Imaging data reviewed.     Dr. Piter Sequeira D.O.  [facility]  Pager [phone]

## 2020-01-14 NOTE — PLAN OF CARE
Discharge Planner PT   Patient plan for discharge: none stated  Current status: Pt up in chair upon arrival; agreeable to therapy. Performed STS transfer to FWW completed with CGA. Ambulated 400 ft x 1 with FWW and SBA; cues for walker proximity. Pt went up/down 10 stairs x 1 with bilateral railings and CGA. Transferred to chair at end of session.  Barriers to return to prior living situation: Fall risk, Impaired balance and strength, level of assistance.   Recommendations for discharge: TCU per plan established by the PT.  Rationale for recommendations: Pt will need continued skilled PT at a TCU to achieve PLOF and independence.       Entered by: Nesha Jin 01/14/2020 11:57 AM

## 2020-01-15 ENCOUNTER — APPOINTMENT (OUTPATIENT)
Dept: PHYSICAL THERAPY | Facility: CLINIC | Age: 65
DRG: 897 | End: 2020-01-15
Payer: MEDICARE

## 2020-01-15 PROCEDURE — 25000132 ZZH RX MED GY IP 250 OP 250 PS 637: Mod: GY | Performed by: HOSPITALIST

## 2020-01-15 PROCEDURE — 25000132 ZZH RX MED GY IP 250 OP 250 PS 637: Mod: GY | Performed by: INTERNAL MEDICINE

## 2020-01-15 PROCEDURE — 97116 GAIT TRAINING THERAPY: CPT | Mod: GP

## 2020-01-15 PROCEDURE — 99207 ZZC CDG-MDM COMPONENT: MEETS MODERATE - UP CODED: CPT | Performed by: HOSPITALIST

## 2020-01-15 PROCEDURE — 12000000 ZZH R&B MED SURG/OB

## 2020-01-15 PROCEDURE — 99232 SBSQ HOSP IP/OBS MODERATE 35: CPT | Performed by: HOSPITALIST

## 2020-01-15 RX ORDER — TRAZODONE HYDROCHLORIDE 100 MG/1
100 TABLET ORAL AT BEDTIME
Status: ON HOLD | DISCHARGE
Start: 2020-01-15 | End: 2020-02-26

## 2020-01-15 RX ORDER — QUETIAPINE FUMARATE 50 MG/1
50 TABLET, FILM COATED ORAL
Qty: 30 TABLET | Refills: 0 | DISCHARGE
Start: 2020-01-15 | End: 2020-03-07

## 2020-01-15 RX ORDER — LACTULOSE 10 G/15ML
10 SOLUTION ORAL 3 TIMES DAILY
DISCHARGE
Start: 2020-01-15 | End: 2020-03-07

## 2020-01-15 RX ADMIN — LORAZEPAM 0.25 MG: 0.5 TABLET ORAL at 10:28

## 2020-01-15 RX ADMIN — LACTULOSE 10 G: 10 SOLUTION ORAL at 15:04

## 2020-01-15 RX ADMIN — LORAZEPAM 0.25 MG: 0.5 TABLET ORAL at 19:46

## 2020-01-15 RX ADMIN — MIRTAZAPINE 15 MG: 15 TABLET, FILM COATED ORAL at 21:19

## 2020-01-15 RX ADMIN — FLUTICASONE FUROATE AND VILANTEROL TRIFENATATE 1 PUFF: 200; 25 POWDER RESPIRATORY (INHALATION) at 08:16

## 2020-01-15 RX ADMIN — PANTOPRAZOLE SODIUM 40 MG: 40 TABLET, DELAYED RELEASE ORAL at 06:32

## 2020-01-15 RX ADMIN — LACTULOSE 10 G: 10 SOLUTION ORAL at 08:14

## 2020-01-15 RX ADMIN — LORAZEPAM 0.25 MG: 0.5 TABLET ORAL at 15:04

## 2020-01-15 RX ADMIN — TRAZODONE HYDROCHLORIDE 100 MG: 100 TABLET ORAL at 21:19

## 2020-01-15 RX ADMIN — NICOTINE 1 PATCH: 21 PATCH, EXTENDED RELEASE TRANSDERMAL at 19:46

## 2020-01-15 RX ADMIN — LACTULOSE 10 G: 10 SOLUTION ORAL at 21:19

## 2020-01-15 RX ADMIN — FOLIC ACID 1 MG: 1 TABLET ORAL at 08:14

## 2020-01-15 RX ADMIN — MULTIPLE VITAMINS W/ MINERALS TAB 1 TABLET: TAB at 08:14

## 2020-01-15 RX ADMIN — DEXTRAN 70 AND HYPROMELLOSE 2910 2 DROP: 1; 3 SOLUTION/ DROPS OPHTHALMIC at 16:58

## 2020-01-15 ASSESSMENT — ACTIVITIES OF DAILY LIVING (ADL)
ADLS_ACUITY_SCORE: 17
ADLS_ACUITY_SCORE: 17
ADLS_ACUITY_SCORE: 20
ADLS_ACUITY_SCORE: 20
ADLS_ACUITY_SCORE: 19
ADLS_ACUITY_SCORE: 20

## 2020-01-15 ASSESSMENT — MIFFLIN-ST. JEOR: SCORE: 1567.63

## 2020-01-15 NOTE — PLAN OF CARE
DATE & TIME: 1/14/2020 1900-0730                    Cognitive Concerns/ Orientation: A&Ox4, anxious at times.   BEHAVIOR & AGGRESSION TOOL COLOR: Green  CIWA SCORE: NA    ABNL VS/O2: VSS on RA except bradycardia-HR 50 s  MOBILITY: Up with SBA, gait belt, walker (walker for long distances)  PAIN MANAGMENT: Denies  DIET: Regular, tolerating  BOWEL/BLADDER: Continent, two stools this shift.  ABNL LAB/BG: Creat 1.22; Hgb 9.9  DRAIN/DEVICES: PIV SL  TELEMETRY RHYTHM: NA  SKIN: Paracentesis site to R abdomen, C/D/I. Bruises.  TESTS/PROCEDURES: NA  D/C DAY/GOALS/PLACE: Pending placement to TCU.  OTHER IMPORTANT INFO: Ambulates often. C/o abdominal fullness. Abdomen rounded/firm to touch. +BS. 1-2+ BLE edema.  Nicotine patch to R arm.  PRN Ativan x1 for anxiety.

## 2020-01-15 NOTE — PROGRESS NOTES
SW U progress note.  D:  [facility] and neither did [facility].  These were two facilities patient had contacted from the SNF list.     explained [facility] and [facility] can accept patient.  He had been at [facility] in the past and was interested in returning there. He did accept [facility].  [facility] updated their liaison.  She requested new assessment information as they had accepted patient several days ago and she also needed confirmation that his supplemental insurance covers the daily medicare SNF co pay if patient is at the TCU beyond 20 days.  Eribertor faxed updated information and confirmed with his supplemental insurance ([employer])  Eribertor spoke with the insurance at [phone] and confirmed this is a medicare supplement and they do cover the co payment days .    Contacted Ermelinda at [phone] to update her.  She reports the DON has not reviewed the faxed clinical information at this time and due to a large volume of admissions this afternoon, the facility cannot accept patient till tomorrow.     has arranged for transport at 0930 via [facility].  Patient requested medivan and is aware of the private pay cost.   Updated patient.  PLAN:  discharge on Thursday at 0930 if their DON approves the admission.   will speak with the liasion by 520 on Thursday morning.

## 2020-01-15 NOTE — PROGRESS NOTES
[facility]  Hospitalist Progress Note        Piter Alis Hua, DO  01/15/2020        Interval History:      Patient states he is doing well.          Assessment and Plan:        Jim Richard is a 64 year old male who with past medical history of depression, anxiety, hypertension, COPD, sleep apnea on CPAP, alcoholism, alcoholic liver disease/cirrhosis with ascites who is being admitted for alcohol withdrawal.     Alcohol withdrawal with DT History of alcohol dependence/alcoholism Endorses drinking 0.75L Vodka/day in the last 2 weeks. Last drink 3pm on 1/6/19. ETOH level in the ED 0.08.  Has tremors on exam. Per chart review, had been in hospital for withdrawal with DT in 10/2019. No known withdrawal seizures.  He wants to quit drinking alcohol. Has been disoriented and intermittently hallucinating. On 1/10, he overall appears improved.On 1/11, he is more clear, oriented.   - Multivitamin, Folic Acid and thiamine  - appreciate psych consultation  - CD consult, recommend Rule 25     Alcoholic liver disease/cirrhosis, likely superimposed mild alcoholic hepatitis Ascites History of esophageal varices Paracentesis was performed on 12/ 31/2019 with removal of 5 L.  His laboratory shows bilirubin of 4, , ALT 43.  Ammonia is 53.  INR is 1.26 on admission.  Bilirubin down trended to 1.4 and AST to 111 on recheck. s/p paracentesis with removal of 4.3L on 1/7/20. Fluid analysis not suggestive of SBP, culture no growth to date  - Continue with lactulose  - hold aldactone and lasix  - GI consult appreciated  - Continue with PPI     Mild renal insufficieny Cr 1.14->1.2>1.33 on 1/12  - holding aldactone, Lasix      Depression/anxiety has not taken any meds for ~2 weeks.   - Remeron, Trazodone      Hypertension  - continue Inderal     Obstructive sleep apnea with CPAP  - CPAP per home setting     COPD  - continue prior to admission inhalers     Tobacco use/depdence;  - smokes 1ppd, counseled  "cessation. Nicotine patch     Hypokalemia  Hypophosphatemia  - monitor     Generalized weakness  PT/OT recommend TCU. Patient reported to SW that he wants to discharge home with home care. Discussed with patient again, he is in agreement for TCU discharge  - SW following     Dysuria on   - UA negative for infection      DVT Prophylaxis: Pneumatic Compression Devices  Code Status: DNR/DNI     Disposition: Expected discharge pending placement verification.                    Physical Exam:      Heart Rate: 59    Blood pressure 120/61, pulse 58, temperature 98.5  F (36.9  C), temperature source Oral, resp. rate 16, height 1.702 m (5' 7\"), weight 82.4 kg (181 lb 10.5 oz), SpO2 98 %.    Vitals:    20 0748 01/15/20 0356   Weight: 81.6 kg (180 lb) 82.4 kg (181 lb 10.5 oz)       Vital Sign Ranges  Temperature Temp  Av.9  F (37.2  C)  Min: 98.5  F (36.9  C)  Max: 99.3  F (37.4  C)   Blood pressure Systolic (24hrs), Av , Min:108 , Max:139        Diastolic (24hrs), Av, Min:61, Max:70      Pulse Pulse  Av  Min: 58  Max: 58   Respirations Resp  Av  Min: 16  Max: 16   Pulse oximetry SpO2  Av.3 %  Min: 98 %  Max: 99 %     Vital Signs with Ranges  Temp:  [98.5  F (36.9  C)-99.3  F (37.4  C)] 98.5  F (36.9  C)  Pulse:  [58] 58  Heart Rate:  [54-59] 59  Resp:  [16] 16  BP: (108-139)/(61-70) 120/61  SpO2:  [98 %-99 %] 98 %    I/O Last 3 Shifts:   I/O last 3 completed shifts:  In: 1410 [P.O.:1410]  Out: -     I/O past 24 hours:     Intake/Output Summary (Last 24 hours) at 1/15/2020 0920  Last data filed at 2020 1718  Gross per 24 hour   Intake 1170 ml   Output --   Net 1170 ml     GENERAL: Alert and oriented. NAD. Conversational, appropriate. Pleasant.   HEENT: Normocephalic. EOMI. No icterus or injection. Nares normal.   LUNGS: Clear to auscultation. No dyspnea at rest.   HEART: Regular rate. Extremities perfused.   ABDOMEN: Soft, nontender, and distended. Positive bowel sounds. "   EXTREMITIES: No LE edema noted.   NEUROLOGIC: Moves extremities x4, follows commands.          Prior to Admission Medications:        Medications Prior to Admission   Medication Sig Dispense Refill Last Dose     acamprosate (CAMPRAL) 333 MG EC tablet Take 666 mg by mouth 3 times daily   Past Month at Unknown time     albuterol (PROAIR HFA/PROVENTIL HFA/VENTOLIN HFA) 108 (90 BASE) MCG/ACT Inhaler Inhale 2 puffs into the lungs every 6 hours as needed    prn     DULoxetine (CYMBALTA) 60 MG capsule Take 1 capsule (60 mg) by mouth daily 30 capsule 0 Past Month at Unknown time     fluticasone-vilanterol (BREO ELLIPTA) 200-25 MCG/INH oral inhaler Inhale 1 puff into the lungs daily    1/6/2020 at Unknown time     folic acid (FOLVITE) 1 MG tablet Take 1 tablet (1 mg) by mouth daily 30 tablet 0 Past Month at Unknown time     furosemide (LASIX) 40 MG tablet Take 1 tablet (40 mg) by mouth daily 30 tablet 0 Past Month at Unknown time     hydrOXYzine (VISTARIL) 50 MG capsule Take 50 mg by mouth 3 times daily as needed for itching   prn     lactulose (CHRONULAC) 10 GM/15ML solution Take 20 g by mouth 2 times daily   Past Month at Unknown time     mirtazapine (REMERON) 15 MG tablet Take 1 tablet (15 mg) by mouth At Bedtime 30 tablet 0 Past Month at Unknown time     multivitamin, therapeutic with minerals (THERA-VIT-M) TABS tablet Take 1 tablet by mouth daily 30 each 0 Past Month at Unknown time     nicotine (NICODERM CQ) 21 MG/24HR 24 hr patch Place 1 patch onto the skin daily 7 patch 0 1/6/2020 at Unsure if has one on     propranolol (INDERAL) 10 MG tablet Take 1 tablet (10 mg) by mouth 2 times daily (Patient taking differently: Take 10 mg by mouth 2 times daily Patient thinks he may be taking once daily but unsure) 60 tablet 0 Past Month at Unknown time     QUEtiapine (SEROQUEL) 50 MG tablet Take 1 tablet (50 mg) by mouth At Bedtime 30 tablet 0 Past Month at Unknown time     spironolactone (ALDACTONE) 25 MG tablet Take 2  tablets (50 mg) by mouth daily (Patient taking differently: Take 25 mg by mouth daily Rx written 50mg daily, but he believes he's taking 1/day) 30 tablet 0 Past Month at Unknown time     tamsulosin (FLOMAX) 0.4 MG capsule Take 0.4 mg by mouth daily Was discontinued at last admission in 10/2019, but still taking per patient.   Past Month at Unknown time     traZODone (DESYREL) 100 MG tablet Take 2 tablets (200 mg) by mouth At Bedtime 60 tablet 0 Past Month at Unknown time     vitamin B1 (THIAMINE) 100 MG tablet Take 1 tablet (100 mg) by mouth daily 30 tablet 0 Past Month at Unknown time     order for DME Equipment being ordered: Walker Wheels () and Walker ()  Treatment Diagnosis: difficulty walking 1 each 0      pantoprazole (PROTONIX) 40 MG EC tablet Take 1 tablet (40 mg) by mouth every morning (before breakfast) 30 tablet 0             Medications:        Current Facility-Administered Medications   Medication Last Rate     Current Facility-Administered Medications   Medication Dose Route Frequency     fluticasone-vilanterol  1 puff Inhalation Daily     folic acid  1 mg Oral Daily     lactulose  10 g Oral TID     mirtazapine  15 mg Oral At Bedtime     multivitamin w/minerals  1 tablet Oral Daily     nicotine  1 patch Transdermal Daily     nicotine   Transdermal Q8H     pantoprazole  40 mg Oral QAM AC     propranolol  10 mg Oral BID     sodium chloride (PF)  3 mL Intracatheter Q8H     traZODone  100 mg Oral At Bedtime     Current Facility-Administered Medications   Medication Dose Route Frequency     benztropine  1-2 mg Oral TID PRN     haloperidol lactate  1-2 mg Intravenous Q6H PRN     hypromellose-dextran  2 drop Ophthalmic Q1H PRN     lidocaine 4%   Topical Q1H PRN     lidocaine (buffered or not buffered)  0.1-1 mL Other Q1H PRN     LORazepam  0.25-0.5 mg Oral Q4H PRN     LORazepam  1-2 mg Oral Q30 Min PRN    Or     LORazepam  1-2 mg Intravenous Q30 Min PRN     magnesium sulfate  4 g Intravenous Q4H  PRN     melatonin  1 mg Oral At Bedtime PRN     naloxone  0.1-0.4 mg Intravenous Q2 Min PRN     ondansetron  4 mg Oral Q6H PRN    Or     ondansetron  4 mg Intravenous Q6H PRN     prochlorperazine  5 mg Intravenous Q6H PRN    Or     prochlorperazine  5 mg Oral Q6H PRN    Or     prochlorperazine  12.5 mg Rectal Q12H PRN     senna-docusate  1 tablet Oral BID PRN    Or     senna-docusate  2 tablet Oral BID PRN     sodium chloride (PF)  3 mL Intracatheter q1 min prn            Data:      Lab data reviewed.   Recent Labs   Lab 01/14/20  0838 01/13/20  0003 01/12/20  0607  01/09/20  0841   HGB 9.9*  --   --   --  9.7*   MCV 98  --   --   --  96   *  --   --   --  63*    134 135   < > 137   POTASSIUM 3.8 4.0 3.9   < > 3.4   CHLORIDE 105 104 104   < > 106   CO2 24 24 26   < > 26   BUN 34* 37* 33*   < > 21   CR 1.22 1.41* 1.33*   < > 1.17   ANIONGAP 6 6 5   < > 5   KEV 8.5 8.2* 8.3*   < > 8.4*   * 117* 99   < > 126*    < > = values in this interval not displayed.           Imaging:      Imaging data reviewed.     DUANE WesleyO.  [facility]  Pager [phone]

## 2020-01-15 NOTE — PLAN OF CARE
DATE & TIME: 1/14/2020 DAY                    Cognitive Concerns/ Orientation : A&Ox4   BEHAVIOR & AGGRESSION TOOL COLOR: Green  CIWA SCORE: NA    ABNL VS/O2: VSS on RA except bradycardia  MOBILITY: Up with SBA, gait belt, walker (walker for long distances)  PAIN MANAGMENT: Denies  DIET: Regular, tolerating  BOWEL/BLADDER: Continent  ABNL LAB/BG: see epic  DRAIN/DEVICES: PIV SL  TELEMETRY RHYTHM: NA  SKIN: Parcentesis site to R abdomen, C/D/I  TESTS/PROCEDURES: NA  D/C DAY/GOALS/PLACE: Pending placement  OTHER IMPORTANT INFO: Ambulates often. C/o abdominal fullness. Abdomen rounded/firm to touch. +BS. Several BMs today. Bilateral foot edema.

## 2020-01-15 NOTE — PROGRESS NOTES
"SW:  D:  Patient reports \"I have found a place to go\".  He reports [facility] [place] and was told they have a vacancy and take Medicare. Writer expressed surprise, explaining [facility] rarely have vacancies and that they will need to review his record before they can offer him a bed.     Writer called admissions at [facility] at [phone] and left a message.  The admission staff will not be in till 1000.  "

## 2020-01-15 NOTE — PROGRESS NOTES
CLINICAL NUTRITION SERVICES - REASSESSMENT NOTE      Recommendations Ordered by Registered Dietitian (RD):   Continue current diet order  Continue strawberry milkshake + 2 pkts beneprotein once daily   MALNUTRITION 1/15/2020  % Weight Loss:  Weight loss does not meet criteria for malnutrition, reports a normal wt of 195 lbs and weighing 170 lbs 4 months ago   % Intake:  No decreased intake noted  Subcutaneous Fat Loss:  None observed  Muscle Loss:  None observed  Fluid Retention:  None noted    Malnutrition Diagnosis: Patient does not meet two of the above criteria necessary for diagnosing malnutrition        EVALUATION OF PROGRESS TOWARD GOALS   Diet:    Regular  Strawberry shake + 2 pkts beneprotein once daily    Intake/Tolerance:    Information obtained from chart:  Pt is eating 100% of all meals  Currently on a Multivitamin, Folic Acid and Thiamine   Information obtained from pt:  Meals are going very well, eating more than he usually would   Consumes 100% of his strawberry milkshake daily      ASSESSED NUTRITION NEEDS:  Dosing Weight 81.6 kg  Estimated Energy Needs: 2040 - 2448 kcals (25-30 Kcal/Kg)  Justification: maintenance  Estimated Protein Needs:  98 - 122 grams protein (1.2-1.5 g pro/Kg)  Justification: maintenance  Estimated Fluid Needs: 2040 - 2448 mL (25-30 mL/kg) or per MD  Justification: maintenance       NEW FINDINGS:   1/15: 82.4 kg (181 lb)  Wt has remained stable  Nutrition History:  Breakfast: skips  Lunch: soup  Dinner: varies; burger, pork-chop, chicken, or subway   No snacks     Previous Goals:   Consume greater than/ equal to 50% of meals TID + one milkshake daily  Evaluation: Met    Previous Nutrition Diagnosis:   Inadequate oral intake related to decreased appetite with increased alcohol consumption as evidenced by pt reports poor appetite for at least 2 weeks    Evaluation: Improving      MALNUTRITION  % Weight Loss:  Weight loss does not meet criteria for malnutrition, reports a normal  wt of 195 lbs and weighing 170 lbs 4 months ago   % Intake:  No decreased intake noted  Subcutaneous Fat Loss:  None observed  Muscle Loss:  None observed  Fluid Retention:  None noted    Malnutrition Diagnosis: Patient does not meet two of the above criteria necessary for diagnosing malnutrition     CURRENT NUTRITION DIAGNOSIS  No nutrition diagnosis identified at this time     INTERVENTIONS  Recommendations / Nutrition Prescription  Continue current diet order  Continue strawberry milkshake + 2 pkts beneprotein once daily    Implementation  Medical Food Supplement: see above     Goals  Consume greater than/ equal to 75% of meals TID + one milkshake daily      MONITORING AND EVALUATION:  Progress towards goals will be monitored and evaluated per protocol and Practice Guidelines    Chantelle Manju  Dietetic Intern

## 2020-01-15 NOTE — PLAN OF CARE
Discharge Planner PT   Patient plan for discharge: TCU  Current status: Pt up in chair upon arrival; agreeable to therapy. Performed repeat STS transfer x 6 with close SBA. Ambulated 450 ft x 1 with FWW and SBA. Pt went up/down 10 stairs x 1 with bilateral railings and CGA. Transferred to chair at end of session.  Barriers to return to prior living situation: Fall risk, Impaired balance and strength, level of assistance.   Recommendations for discharge: TCU per plan established by the PT.  Rationale for recommendations: Pt will need continued skilled PT at a TCU to achieve PLOF and independence.       Entered by: Nesha Jin 01/15/2020 10:27 AM

## 2020-01-15 NOTE — PLAN OF CARE
OT: pt discharging to TCU today, will defer further OT to next level of care, GOALS NOT MET, see discharge summary

## 2020-01-16 VITALS
WEIGHT: 184.08 LBS | DIASTOLIC BLOOD PRESSURE: 76 MMHG | TEMPERATURE: 98.6 F | RESPIRATION RATE: 16 BRPM | OXYGEN SATURATION: 99 % | SYSTOLIC BLOOD PRESSURE: 152 MMHG | HEART RATE: 72 BPM | BODY MASS INDEX: 28.89 KG/M2 | HEIGHT: 67 IN

## 2020-01-16 LAB
ANION GAP SERPL CALCULATED.3IONS-SCNC: 4 MMOL/L (ref 3–14)
BUN SERPL-MCNC: 30 MG/DL (ref 7–30)
CALCIUM SERPL-MCNC: 8.6 MG/DL (ref 8.5–10.1)
CHLORIDE SERPL-SCNC: 107 MMOL/L (ref 94–109)
CO2 SERPL-SCNC: 24 MMOL/L (ref 20–32)
CREAT SERPL-MCNC: 1.19 MG/DL (ref 0.66–1.25)
ERYTHROCYTE [DISTWIDTH] IN BLOOD BY AUTOMATED COUNT: 15.4 % (ref 10–15)
GFR SERPL CREATININE-BSD FRML MDRD: 63 ML/MIN/{1.73_M2}
GLUCOSE SERPL-MCNC: 108 MG/DL (ref 70–99)
HCT VFR BLD AUTO: 28.7 % (ref 40–53)
HGB BLD-MCNC: 9.4 G/DL (ref 13.3–17.7)
MCH RBC QN AUTO: 31.5 PG (ref 26.5–33)
MCHC RBC AUTO-ENTMCNC: 32.8 G/DL (ref 31.5–36.5)
MCV RBC AUTO: 96 FL (ref 78–100)
PLATELET # BLD AUTO: 175 10E9/L (ref 150–450)
POTASSIUM SERPL-SCNC: 4 MMOL/L (ref 3.4–5.3)
RBC # BLD AUTO: 2.98 10E12/L (ref 4.4–5.9)
SODIUM SERPL-SCNC: 135 MMOL/L (ref 133–144)
WBC # BLD AUTO: 3.9 10E9/L (ref 4–11)

## 2020-01-16 PROCEDURE — 36415 COLL VENOUS BLD VENIPUNCTURE: CPT | Performed by: HOSPITALIST

## 2020-01-16 PROCEDURE — 25000132 ZZH RX MED GY IP 250 OP 250 PS 637: Mod: GY | Performed by: HOSPITALIST

## 2020-01-16 PROCEDURE — 25000132 ZZH RX MED GY IP 250 OP 250 PS 637: Mod: GY | Performed by: INTERNAL MEDICINE

## 2020-01-16 PROCEDURE — 85027 COMPLETE CBC AUTOMATED: CPT | Performed by: HOSPITALIST

## 2020-01-16 PROCEDURE — 99239 HOSP IP/OBS DSCHRG MGMT >30: CPT | Performed by: HOSPITALIST

## 2020-01-16 PROCEDURE — 80048 BASIC METABOLIC PNL TOTAL CA: CPT | Performed by: HOSPITALIST

## 2020-01-16 RX ADMIN — PROPRANOLOL HYDROCHLORIDE 10 MG: 10 TABLET ORAL at 08:24

## 2020-01-16 RX ADMIN — LACTULOSE 10 G: 10 SOLUTION ORAL at 08:25

## 2020-01-16 RX ADMIN — FOLIC ACID 1 MG: 1 TABLET ORAL at 08:24

## 2020-01-16 RX ADMIN — MULTIPLE VITAMINS W/ MINERALS TAB 1 TABLET: TAB at 08:25

## 2020-01-16 RX ADMIN — DEXTRAN 70 AND HYPROMELLOSE 2910 2 DROP: 1; 3 SOLUTION/ DROPS OPHTHALMIC at 08:25

## 2020-01-16 RX ADMIN — PANTOPRAZOLE SODIUM 40 MG: 40 TABLET, DELAYED RELEASE ORAL at 07:09

## 2020-01-16 RX ADMIN — FLUTICASONE FUROATE AND VILANTEROL TRIFENATATE 1 PUFF: 200; 25 POWDER RESPIRATORY (INHALATION) at 08:25

## 2020-01-16 ASSESSMENT — ACTIVITIES OF DAILY LIVING (ADL)
ADLS_ACUITY_SCORE: 17

## 2020-01-16 ASSESSMENT — MIFFLIN-ST. JEOR: SCORE: 1578.63

## 2020-01-16 NOTE — DISCHARGE INSTRUCTIONS
** A follow-up appointment with Dr. Elias on Wednesday, 1/22/2020, at 1250 was made for you.  In case, you could not make it, amador contact:    [phone]  [facility]   [address],  [place] [address]       [facility] for chemical dependency assessments, [phone]    Bre Mauricio's phone number is [phone]

## 2020-01-16 NOTE — PLAN OF CARE
"Discharge    Patient discharged to home via private transportation with a friend/roommate.    Care plan note:  Patient is A&Ox4; able to make his needs known.  VSS; O2sats 90's RA; no SOB/N/V/pain.  Appetite is good.  No new complaints.  PT/OT were recommending TCU; however, patient declined: \"I'm not going there.  I'm going home.  I feel much better and stronger now.\"  Discharge instructions were provided.  Patient verbalized understanding of all information received.      Listed belongings gathered and returned to patient. Yes  Care Plan and Patient education resolved: Yes  Prescriptions if needed, hard copies sent with patient  NA  Home and hospital acquired medications returned to patient: Yes  Medication Bin checked and emptied on discharge Yes  Follow up appointment made for patient: Yes    "

## 2020-01-16 NOTE — DISCHARGE SUMMARY
[facility]    Discharge Summary  Hospitalist    Date of Admission:  1/7/2020  Date of Discharge:  1/16/2020  Discharging Provider: Piter Sequeira  Date of Service (when I saw the patient): 01/16/20    History of Present Illness   Jim Richard is a 64 year old male who with past medical history of depression, anxiety, hypertension, COPD, sleep apnea on CPAP, alcoholism, alcoholic liver disease/cirrhosis with ascites who presents due to generalized weakness and alcohol withdrawal.     Patient reports he has been drinking 0.75L vodka per day since [place].  He last drank yesterday around 3 PM.  He noted significant tremors and he was quite too weak to even walk 10 feet in his home which prompted him to activate EMS.  He denies nausea or vomiting.  He does endorse loose stools mainly brown, denies melena or hematochezia.  He endorses chronic abdominal discomfort due to his ascites and feels that his ascites has gotten bigger since his last paracentesis 1 week ago.  He denies fever, but endorses chills.  He denies chest pain, but feels short of breath.  He denies suicidal thoughts.  He reports he has not taken any of his medications in the last 2 weeks.  He also states he was supposed to follow-up with gastroenterology about 2 weeks ago which he did not make it.  Patient reports that he wants to quit drinking.    Hospital Course   Jim Richard was admitted on 1/7/2020.  The following problems were addressed during his hospitalization:    Jim Richard is a 64 year old male who with past medical history of depression, anxiety, hypertension, COPD, sleep apnea on CPAP, alcoholism, alcoholic liver disease/cirrhosis with ascites who is being admitted for alcohol withdrawal.     Alcohol withdrawal with DT History of alcohol dependence/alcoholism Endorses drinking 0.75L Vodka/day in the last 2 weeks. Last drink 3pm on 1/6/19. ETOH level in the ED 0.08.  Has tremors on exam. Per chart  review, had been in hospital for withdrawal with DT in 10/2019. No known withdrawal seizures.  He wants to quit drinking alcohol. Has been disoriented and intermittently hallucinating. On 1/10, he overall appears improved.On 1/11, he is more clear, oriented.   - Multivitamin.   - Close outpatient follow up with psychiatry and chemical dependency.   - Close outpatient follow up with primary care.   - Outpatient SW follow up.   - Patient declined TCU placement.      Alcoholic liver disease/cirrhosis, likely superimposed mild alcoholic hepatitis Ascites History of esophageal varices Paracentesis was performed on 12/ 31/2019 with removal of 5 L.  His laboratory shows bilirubin of 4, , ALT 43.  Ammonia is 53.  INR is 1.26 on admission.  Bilirubin down trended to 1.4 and AST to 111 on recheck. s/p paracentesis with removal of 4.3L on 1/7/20. Fluid analysis not suggestive of SBP, culture no growth to date  - Continue with lactulose  - Hold aldactone and lasix at this time.   - Fluid restriction.   - GI consulted as inpatient, close outpatient follow up.   - Continue with PPI     Mild renal insufficieny Cr 1.14->1.2>1.33 on 1/12  - Hold aldactone, Lasix at this time, consider restart if creatinine stable as outpatient.      Depression/anxiety has not taken any meds for ~2 weeks.   - Remeron, Trazodone      Hypertension  - continue Inderal     Obstructive sleep apnea with CPAP  - CPAP per home setting     COPD  - Continue prior to admission inhalers     Tobacco use/depdence;  - Educated on cessation, verbalized understanding.      Hypokalemia  Hypophosphatemia  - monitor as outpatient.       Dysuria on 1/11  - UA negative for infection      Pending Results   These results will be followed up by PCP  Unresulted Labs Ordered in the Past 30 Days of this Admission     No orders found from 12/8/2019 to 1/8/2020.          Code Status   DNR / DNI       Primary Care Physician   FERNANDA BRANTLEY    Physical Exam   Temp:  98.6  F (37  C) Temp src: Oral BP: (!) 152/76 Pulse: 72 Heart Rate: 60 Resp: 16 SpO2: 99 % O2 Device: None (Room air)    Vitals:    01/07/20 0748 01/15/20 0356 01/16/20 0108   Weight: 81.6 kg (180 lb) 82.4 kg (181 lb 10.5 oz) 83.5 kg (184 lb 1.4 oz)     Vital Signs with Ranges  Temp:  [98.1  F (36.7  C)-99.1  F (37.3  C)] 98.6  F (37  C)  Pulse:  [60-72] 72  Heart Rate:  [60-63] 60  Resp:  [16] 16  BP: (131-152)/(67-76) 152/76  SpO2:  [97 %-99 %] 99 %  No intake/output data recorded.    GENERAL: Alert and oriented. NAD. Conversational, appropriate. Pleasant.   HEENT: Normocephalic. EOMI. No icterus or injection. Nares normal.   LUNGS: Clear to auscultation. No dyspnea at rest.   HEART: Regular rate. Extremities perfused.   ABDOMEN: Soft, nontender, and distended. Positive bowel sounds.   EXTREMITIES: No LE edema noted.   NEUROLOGIC: Moves extremities x4, follows commands.     Discharge Disposition   Discharged to home  Condition at discharge: Guarded    Consultations This Hospital Stay   PHYSICAL THERAPY ADULT IP CONSULT  OCCUPATIONAL THERAPY ADULT IP CONSULT  SOCIAL WORK IP CONSULT  PSYCHIATRY IP CONSULT  CHEMICAL DEPENDENCY IP CONSULT  GASTROENTEROLOGY IP CONSULT  PHARMACY IP CONSULT  PHYSICAL THERAPY ADULT IP CONSULT  OCCUPATIONAL THERAPY ADULT IP CONSULT    Time Spent on this Encounter   IPiter DO, personally saw the patient today and spent greater than 30 minutes discharging this patient.    Discharge Orders      Home Care Social Service Referral for Hospital Discharge      Mantoux instructions    Give two-step Mantoux (PPD) Per Facility Policy Yes     Reason for your hospital stay    Alcohol     Activity - Up with nursing assistance     Follow Up and recommended labs and tests    Follow up with primary care provider  Follow up with psychiatry as directed.   Follow up with chemical dependency as directed.     DNR/DNI     Fall precautions     Advance Diet as Tolerated    Follow this diet upon  discharge: Orders Placed This Encounter      Snacks/Supplements Adult: Other; Strawberry shake + 2 pkts beneprotein; Between Meals      Combination Diet Regular Diet Adult  Fluid restriction 1500cc.     Discharge Medications   Discharge Medication List as of 1/16/2020 11:07 AM      CONTINUE these medications which have CHANGED    Details   lactulose (CHRONULAC) 10 GM/15ML solution Take 15 mLs (10 g) by mouth 3 times daily, Transitional      QUEtiapine (SEROQUEL) 50 MG tablet Take 1 tablet (50 mg) by mouth nightly as needed (sleep), Disp-30 tablet, R-0, Transitional      traZODone (DESYREL) 100 MG tablet Take 1 tablet (100 mg) by mouth At Bedtime, Transitional         CONTINUE these medications which have NOT CHANGED    Details   albuterol (PROAIR HFA/PROVENTIL HFA/VENTOLIN HFA) 108 (90 BASE) MCG/ACT Inhaler Inhale 2 puffs into the lungs every 6 hours as needed , Historical      fluticasone-vilanterol (BREO ELLIPTA) 200-25 MCG/INH oral inhaler Inhale 1 puff into the lungs daily , Historical      folic acid (FOLVITE) 1 MG tablet Take 1 tablet (1 mg) by mouth daily, Disp-30 tablet, R-0, E-PrescribeFuture refills by PCP Dr. FERNANDA BRANTLEY with phone number [phone].      mirtazapine (REMERON) 15 MG tablet Take 1 tablet (15 mg) by mouth At Bedtime, Disp-30 tablet, R-0, E-PrescribeFuture refills by PCP Dr. FERNANDA BRANTLEY with phone number [phone].      multivitamin, therapeutic with minerals (THERA-VIT-M) TABS tablet Take 1 tablet by mouth daily, Disp-30 each, R-0, Transitional      propranolol (INDERAL) 10 MG tablet Take 1 tablet (10 mg) by mouth 2 times daily, Disp-60 tablet, R-0, E-PrescribeFuture refills by PCP Dr. FERNANDA BRANTLEY with phone number [phone].      tamsulosin (FLOMAX) 0.4 MG capsule Take 0.4 mg by mouth daily Was discontinued at last admission in 10/2019, but still taking per patient., Historical      vitamin B1 (THIAMINE) 100 MG tablet Take 1 tablet (100 mg) by mouth daily,  Disp-30 tablet, R-0, E-PrescribeFuture refills by PCP Dr. FERNANDA BRANTLEY with phone number [phone].      order for DME Equipment being ordered: Walker Wheels () and Walker ()  Treatment Diagnosis: difficulty walkingDisp-1 each, R-0, Local Print      pantoprazole (PROTONIX) 40 MG EC tablet Take 1 tablet (40 mg) by mouth every morning (before breakfast), Disp-30 tablet, R-0, E-PrescribeFuture refills by PCP Dr. FERNANDA BRANTLEY with phone number [phone].         STOP taking these medications       acamprosate (CAMPRAL) 333 MG EC tablet Comments:   Reason for Stopping:         DULoxetine (CYMBALTA) 60 MG capsule Comments:   Reason for Stopping:         furosemide (LASIX) 40 MG tablet Comments:   Reason for Stopping:         hydrOXYzine (VISTARIL) 50 MG capsule Comments:   Reason for Stopping:         nicotine (NICODERM CQ) 21 MG/24HR 24 hr patch Comments:   Reason for Stopping:         spironolactone (ALDACTONE) 25 MG tablet Comments:   Reason for Stopping:             Allergies   Allergies   Allergen Reactions     Amlodipine Swelling     Lisinopril      Other reaction(s): Angioedema  Mouth and tongue swelling   Mouth and tongue swelling        Data   Most Recent 3 CBC's:  Recent Labs   Lab Test 01/16/20  0748 01/14/20  0838 01/09/20  0841   WBC 3.9* 3.7* 5.5   HGB 9.4* 9.9* 9.7*   MCV 96 98 96    143* 63*      Most Recent 3 BMP's:  Recent Labs   Lab Test 01/16/20  0748 01/14/20  0838 01/13/20  0003    135 134   POTASSIUM 4.0 3.8 4.0   CHLORIDE 107 105 104   CO2 24 24 24   BUN 30 34* 37*   CR 1.19 1.22 1.41*   ANIONGAP 4 6 6   KEV 8.6 8.5 8.2*   * 104* 117*     Most Recent 2 LFT's:  Recent Labs   Lab Test 01/14/20  0838 01/10/20  0807   AST 78* 111*   ALT 57 44   ALKPHOS 307* 338*   BILITOTAL 1.2 1.4*     Most Recent INR's and Anticoagulation Dosing History:  Anticoagulation Dose History     Recent Dosing and Labs Latest Ref Rng & Units 8/6/2019 9/25/2019 10/22/2019  11/19/2019 12/24/2019 12/31/2019 1/7/2020    INR 0.86 - 1.14 1.31(H) 1.35(H) 1.13 1.21(H) 1.23(H) 1.24(H) 1.26(H)        Most Recent 3 Troponin's:  Recent Labs   Lab Test 04/26/18  1940   TROPI <0.015     Most Recent Cholesterol Panel:  Recent Labs   Lab Test 08/13/19  0744   CHOL 113   LDL 70   HDL 15*   TRIG 140     Most Recent 6 Bacteria Isolates From Any Culture (See EPIC Reports for Culture Details):  Recent Labs   Lab Test 01/07/20  1600 10/28/19  1244 08/02/19  0840 09/29/18  1359 09/29/18  1352 05/02/18  1535   CULT No growth No growth No growth No growth No growth No growth     Most Recent TSH, T4 and A1c Labs:  Recent Labs   Lab Test 03/22/18  0430 02/08/17  1640   TSH  --  1.51   A1C Canceled, Test credited  --      Results for orders placed or performed during the hospital encounter of 01/07/20   US Paracentesis    Narrative    ULTRASOUND PARACENTESIS 1/7/2020 4:09 PM     HISTORY: High volume paracentesis with or without diagnostic fluid  analysis with labs to be drawn if ordered.    FINDINGS: Ultrasound was used to evaluate for the presence and best  approach for paracentesis. Written and oral informed consent was  obtained. A pause for the cause procedure to verify the correct  patient and correct procedure. The skin overlying the right lower  quadrant was prepped and draped in the usual sterile fashion. The  subcutaneous tissues were anesthetized with 10 mL of 1 percent  lidocaine injected. A catheter was advanced into the peritoneal space  and 4.3 L of  straw colored fluid was drained. There were no immediate  complications. Ultrasound images were permanently stored.  Patient  left the ultrasound suite in satisfactory condition.      Impression    IMPRESSION: Technically successful paracentesis without immediate  complications.    TACO LINDA MD

## 2020-01-16 NOTE — PROGRESS NOTES
GI quick update note:    Pt remains comfortable and eating well when seen. Mental status has improved. Pt still has ascites but states to be asymptomatic and creatinine still slightly elevated despite diuretics being held. At this time will continue to hold diuretics, follow up in GI clinic upon discharge to prevent re-admission. Can titrate diuretics as outpatient or use standing paracentesis.    Pavan Arguello MD on 1/15/2020 at 6:23 PM

## 2020-01-16 NOTE — PLAN OF CARE
OT: pt has discharged to home prior to OT session as declined discharge to TCU, pt is declining homecare as he will not be homebound so does not qualify. GOALS NOT MET

## 2020-01-16 NOTE — PROGRESS NOTES
Care Coordinator alerted by Bre WARNER that pt is now refusing to go to a TCU.  Home care orders are in.  Writer met with pt.  He is not intending to be homebound, so he will not qualify for home care.  PCP follow-up will be made and will ensure his PCP receives  the Discharge Summary when available.

## 2020-01-16 NOTE — PLAN OF CARE
"DATE & TIME: 1/15 from 1900 to 0730    Cognitive Concerns/ Orientation : A&O x 4, anxious at times, given Ativan x 1   BEHAVIOR & AGGRESSION TOOL COLOR: Green, anxious and cooperative for cares. Given Ativan x 1  CIWA SCORE: N/A   ABNL VS/O2: VSS on RA except temp of 99.1  MOBILITY: Up and SBA to bathroom  PAIN MANAGMENT: Denied  DIET: Regular   BOWEL/BLADDER: Continent  ABNL LAB/BG: alkaline phosphate 307, AST 78, WBC 3.7, Hgb 9.9, Platelet 143  DRAIN/DEVICES: N/A  TELEMETRY RHYTHM: N/A  SKIN: Scattered bruised, abdomen round, slightly firm to touch. Denied abdominal pain/discomfort. BG positive.   TESTS/PROCEDURES: N/A  D/C DAY/GOALS/PLACE: Possible discharge tomorrow to [facility] per recommendation vs home as pt is stating \" I am strong and I am going home.\"  OTHER IMPORTANT INFO:      "

## 2020-01-16 NOTE — PLAN OF CARE
"DATE & TIME: 1/15/2020 [id number]          Cognitive Concerns/ Orientation: A&Ox4, anxious at times - ativan given x 2.   BEHAVIOR & AGGRESSION TOOL COLOR: Green  CIWA SCORE: N/A    ABNL VS/O2: Tmax 99.1; bradycardia-HR 50 s; O2sats 90's RA  MOBILITY: Up with SBA/GB/walker (walker for long distances)  PAIN MANAGMENT: Denies  DIET: Regular - appetite is good  BOWEL/BLADDER: Continent - formed BM x 4 today (still on lactulose)  ABNL LAB/BG: Last Creat 1.22; Hgb 9.9  DRAIN/DEVICES: PIV SL  TELEMETRY RHYTHM: N/A  SKIN: Scattered Bruises  TESTS/PROCEDURES: N/A  D/C DAY/GOALS/PLACE: Discharge on Thursday, 1/16, to [facility] at 0930 if their DON approves the admission.  SW will speak with the liasion by 830 on tomorrow morning; however, patient is now stating \"I won't go.  I'm strong enough to go home.\"   OTHER IMPORTANT INFO: Ambulates often.  Abdomen rounded/firm to touch;  (+) BS. 1-2+ BLE edema.  Nicotine patch to right arm.    "

## 2020-02-03 ENCOUNTER — APPOINTMENT (OUTPATIENT)
Dept: ULTRASOUND IMAGING | Facility: CLINIC | Age: 65
DRG: 433 | End: 2020-02-03
Attending: INTERNAL MEDICINE
Payer: MEDICARE

## 2020-02-03 ENCOUNTER — HOSPITAL ENCOUNTER (INPATIENT)
Facility: CLINIC | Age: 65
LOS: 22 days | Discharge: HOME | DRG: 433 | End: 2020-02-26
Attending: EMERGENCY MEDICINE | Admitting: INTERNAL MEDICINE
Payer: MEDICARE

## 2020-02-03 DIAGNOSIS — F10.929 ALCOHOLIC INTOXICATION WITH COMPLICATION (H): ICD-10-CM

## 2020-02-03 DIAGNOSIS — F41.9 ANXIETY AND DEPRESSION: Primary | ICD-10-CM

## 2020-02-03 DIAGNOSIS — F10.930 ALCOHOL WITHDRAWAL SYNDROME WITHOUT COMPLICATION (H): ICD-10-CM

## 2020-02-03 DIAGNOSIS — R45.851 SUICIDAL IDEATION: ICD-10-CM

## 2020-02-03 DIAGNOSIS — F32.A ANXIETY AND DEPRESSION: Primary | ICD-10-CM

## 2020-02-03 DIAGNOSIS — K70.31 ALCOHOLIC CIRRHOSIS OF LIVER WITH ASCITES (H): ICD-10-CM

## 2020-02-03 LAB
ALBUMIN SERPL-MCNC: 2.7 G/DL (ref 3.4–5)
ALP SERPL-CCNC: 286 U/L (ref 40–150)
ALT SERPL W P-5'-P-CCNC: 26 U/L (ref 0–70)
ANION GAP SERPL CALCULATED.3IONS-SCNC: 10 MMOL/L (ref 3–14)
APPEARANCE FLD: CLEAR
AST SERPL W P-5'-P-CCNC: 61 U/L (ref 0–45)
BASOPHILS # BLD AUTO: 0.1 10E9/L (ref 0–0.2)
BASOPHILS NFR BLD AUTO: 0.8 %
BILIRUB SERPL-MCNC: 1.6 MG/DL (ref 0.2–1.3)
BUN SERPL-MCNC: 24 MG/DL (ref 7–30)
CALCIUM SERPL-MCNC: 7.9 MG/DL (ref 8.5–10.1)
CHLORIDE SERPL-SCNC: 108 MMOL/L (ref 94–109)
CO2 SERPL-SCNC: 21 MMOL/L (ref 20–32)
COLOR FLD: YELLOW
CREAT SERPL-MCNC: 1.01 MG/DL (ref 0.66–1.25)
DIFFERENTIAL METHOD BLD: ABNORMAL
EOSINOPHIL # BLD AUTO: 0.1 10E9/L (ref 0–0.7)
EOSINOPHIL NFR BLD AUTO: 1 %
ERYTHROCYTE [DISTWIDTH] IN BLOOD BY AUTOMATED COUNT: 15.5 % (ref 10–15)
ETHANOL SERPL-MCNC: 0.39 G/DL
GFR SERPL CREATININE-BSD FRML MDRD: 77 ML/MIN/{1.73_M2}
GLUCOSE FLD-MCNC: 75 MG/DL
GLUCOSE SERPL-MCNC: 76 MG/DL (ref 70–99)
GRAM STN SPEC: NORMAL
HCT VFR BLD AUTO: 29.4 % (ref 40–53)
HGB BLD-MCNC: 9.6 G/DL (ref 13.3–17.7)
IMM GRANULOCYTES # BLD: 0 10E9/L (ref 0–0.4)
IMM GRANULOCYTES NFR BLD: 0.1 %
INR PPP: 1.18 (ref 0.86–1.14)
INTERPRETATION ECG - MUSE: NORMAL
LIPASE SERPL-CCNC: 369 U/L (ref 73–393)
LYMPHOCYTES # BLD AUTO: 0.9 10E9/L (ref 0.8–5.3)
LYMPHOCYTES NFR BLD AUTO: 11.7 %
LYMPHOCYTES NFR FLD MANUAL: 33 %
MAGNESIUM SERPL-MCNC: 1.6 MG/DL (ref 1.6–2.3)
MCH RBC QN AUTO: 31.7 PG (ref 26.5–33)
MCHC RBC AUTO-ENTMCNC: 32.7 G/DL (ref 31.5–36.5)
MCV RBC AUTO: 97 FL (ref 78–100)
MONOCYTES # BLD AUTO: 0.7 10E9/L (ref 0–1.3)
MONOCYTES NFR BLD AUTO: 9.3 %
MONOS+MACROS NFR FLD MANUAL: 30 %
NEUTROPHILS # BLD AUTO: 6.1 10E9/L (ref 1.6–8.3)
NEUTROPHILS NFR BLD AUTO: 77.1 %
NEUTS BAND NFR FLD MANUAL: 3 %
OTHER CELLS FLD MANUAL: 34 %
PLATELET # BLD AUTO: 153 10E9/L (ref 150–450)
POTASSIUM SERPL-SCNC: 3.6 MMOL/L (ref 3.4–5.3)
PROT SERPL-MCNC: 7 G/DL (ref 6.8–8.8)
RBC # BLD AUTO: 3.03 10E12/L (ref 4.4–5.9)
SODIUM SERPL-SCNC: 139 MMOL/L (ref 133–144)
SPECIMEN SOURCE FLD: NORMAL
SPECIMEN SOURCE FLD: NORMAL
SPECIMEN SOURCE: NORMAL
WBC # BLD AUTO: 7.9 10E9/L (ref 4–11)
WBC # FLD AUTO: 160 /UL

## 2020-02-03 PROCEDURE — G0378 HOSPITAL OBSERVATION PER HR: HCPCS

## 2020-02-03 PROCEDURE — 83690 ASSAY OF LIPASE: CPT | Performed by: EMERGENCY MEDICINE

## 2020-02-03 PROCEDURE — 99222 1ST HOSP IP/OBS MODERATE 55: CPT | Performed by: PSYCHIATRY & NEUROLOGY

## 2020-02-03 PROCEDURE — 85610 PROTHROMBIN TIME: CPT | Performed by: EMERGENCY MEDICINE

## 2020-02-03 PROCEDURE — 83735 ASSAY OF MAGNESIUM: CPT | Performed by: EMERGENCY MEDICINE

## 2020-02-03 PROCEDURE — 25000125 ZZHC RX 250: Performed by: INTERNAL MEDICINE

## 2020-02-03 PROCEDURE — 25000125 ZZHC RX 250: Performed by: RADIOLOGY

## 2020-02-03 PROCEDURE — 82945 GLUCOSE OTHER FLUID: CPT | Performed by: INTERNAL MEDICINE

## 2020-02-03 PROCEDURE — 0W9G3ZZ DRAINAGE OF PERITONEAL CAVITY, PERCUTANEOUS APPROACH: ICD-10-PCS | Performed by: RADIOLOGY

## 2020-02-03 PROCEDURE — 25000128 H RX IP 250 OP 636: Performed by: EMERGENCY MEDICINE

## 2020-02-03 PROCEDURE — 25000128 H RX IP 250 OP 636: Performed by: RADIOLOGY

## 2020-02-03 PROCEDURE — 96374 THER/PROPH/DIAG INJ IV PUSH: CPT

## 2020-02-03 PROCEDURE — HZ2ZZZZ DETOXIFICATION SERVICES FOR SUBSTANCE ABUSE TREATMENT: ICD-10-PCS | Performed by: INTERNAL MEDICINE

## 2020-02-03 PROCEDURE — 99233 SBSQ HOSP IP/OBS HIGH 50: CPT | Performed by: INTERNAL MEDICINE

## 2020-02-03 PROCEDURE — 27210190 US PARACENTESIS

## 2020-02-03 PROCEDURE — 25800030 ZZH RX IP 258 OP 636: Performed by: INTERNAL MEDICINE

## 2020-02-03 PROCEDURE — 99223 1ST HOSP IP/OBS HIGH 75: CPT | Mod: AI | Performed by: INTERNAL MEDICINE

## 2020-02-03 PROCEDURE — 96376 TX/PRO/DX INJ SAME DRUG ADON: CPT

## 2020-02-03 PROCEDURE — 85025 COMPLETE CBC W/AUTO DIFF WBC: CPT | Performed by: EMERGENCY MEDICINE

## 2020-02-03 PROCEDURE — 89051 BODY FLUID CELL COUNT: CPT | Performed by: INTERNAL MEDICINE

## 2020-02-03 PROCEDURE — P9047 ALBUMIN (HUMAN), 25%, 50ML: HCPCS | Performed by: RADIOLOGY

## 2020-02-03 PROCEDURE — 25000132 ZZH RX MED GY IP 250 OP 250 PS 637: Mod: GY | Performed by: PHYSICIAN ASSISTANT

## 2020-02-03 PROCEDURE — 25000128 H RX IP 250 OP 636: Performed by: INTERNAL MEDICINE

## 2020-02-03 PROCEDURE — 99285 EMERGENCY DEPT VISIT HI MDM: CPT | Mod: 25

## 2020-02-03 PROCEDURE — 25000132 ZZH RX MED GY IP 250 OP 250 PS 637: Mod: GY | Performed by: INTERNAL MEDICINE

## 2020-02-03 PROCEDURE — 87205 SMEAR GRAM STAIN: CPT | Performed by: INTERNAL MEDICINE

## 2020-02-03 PROCEDURE — 87015 SPECIMEN INFECT AGNT CONCNTJ: CPT | Performed by: INTERNAL MEDICINE

## 2020-02-03 PROCEDURE — 93005 ELECTROCARDIOGRAM TRACING: CPT

## 2020-02-03 PROCEDURE — 25800030 ZZH RX IP 258 OP 636: Performed by: EMERGENCY MEDICINE

## 2020-02-03 PROCEDURE — 96361 HYDRATE IV INFUSION ADD-ON: CPT

## 2020-02-03 PROCEDURE — 40000863 ZZH STATISTIC RADIOLOGY XRAY, US, CT, MAR, NM

## 2020-02-03 PROCEDURE — 96375 TX/PRO/DX INJ NEW DRUG ADDON: CPT

## 2020-02-03 PROCEDURE — 80053 COMPREHEN METABOLIC PANEL: CPT | Performed by: EMERGENCY MEDICINE

## 2020-02-03 PROCEDURE — 80320 DRUG SCREEN QUANTALCOHOLS: CPT | Performed by: EMERGENCY MEDICINE

## 2020-02-03 PROCEDURE — 87070 CULTURE OTHR SPECIMN AEROBIC: CPT | Performed by: INTERNAL MEDICINE

## 2020-02-03 RX ORDER — METOCLOPRAMIDE HYDROCHLORIDE 5 MG/ML
5 INJECTION INTRAMUSCULAR; INTRAVENOUS ONCE
Status: COMPLETED | OUTPATIENT
Start: 2020-02-03 | End: 2020-02-03

## 2020-02-03 RX ORDER — PROPRANOLOL HYDROCHLORIDE 10 MG/1
10 TABLET ORAL 2 TIMES DAILY
Status: DISCONTINUED | OUTPATIENT
Start: 2020-02-03 | End: 2020-02-26 | Stop reason: HOSPADM

## 2020-02-03 RX ORDER — DEXTROSE MONOHYDRATE 25 G/50ML
25-50 INJECTION, SOLUTION INTRAVENOUS
Status: DISCONTINUED | OUTPATIENT
Start: 2020-02-03 | End: 2020-02-24 | Stop reason: CLARIF

## 2020-02-03 RX ORDER — SPIRONOLACTONE 25 MG/1
100 TABLET ORAL DAILY
Status: DISCONTINUED | OUTPATIENT
Start: 2020-02-03 | End: 2020-02-11

## 2020-02-03 RX ORDER — LORAZEPAM 2 MG/ML
1-2 INJECTION INTRAMUSCULAR EVERY 30 MIN PRN
Status: DISCONTINUED | OUTPATIENT
Start: 2020-02-03 | End: 2020-02-05

## 2020-02-03 RX ORDER — MULTIPLE VITAMINS W/ MINERALS TAB 9MG-400MCG
1 TAB ORAL DAILY
Status: DISCONTINUED | OUTPATIENT
Start: 2020-02-08 | End: 2020-02-04

## 2020-02-03 RX ORDER — LORAZEPAM 2 MG/ML
0.5 INJECTION INTRAMUSCULAR ONCE
Status: COMPLETED | OUTPATIENT
Start: 2020-02-03 | End: 2020-02-03

## 2020-02-03 RX ORDER — LORAZEPAM 1 MG/1
1-2 TABLET ORAL EVERY 30 MIN PRN
Status: DISCONTINUED | OUTPATIENT
Start: 2020-02-03 | End: 2020-02-05

## 2020-02-03 RX ORDER — NICOTINE 21 MG/24HR
1 PATCH, TRANSDERMAL 24 HOURS TRANSDERMAL DAILY
Status: DISCONTINUED | OUTPATIENT
Start: 2020-02-03 | End: 2020-02-26 | Stop reason: HOSPADM

## 2020-02-03 RX ORDER — TRAZODONE HYDROCHLORIDE 50 MG/1
100 TABLET, FILM COATED ORAL AT BEDTIME
Status: DISCONTINUED | OUTPATIENT
Start: 2020-02-03 | End: 2020-02-03

## 2020-02-03 RX ORDER — MIRTAZAPINE 15 MG/1
15 TABLET, FILM COATED ORAL AT BEDTIME
Status: DISCONTINUED | OUTPATIENT
Start: 2020-02-03 | End: 2020-02-26 | Stop reason: HOSPADM

## 2020-02-03 RX ORDER — TRAZODONE HYDROCHLORIDE 50 MG/1
200 TABLET, FILM COATED ORAL AT BEDTIME
Status: DISCONTINUED | OUTPATIENT
Start: 2020-02-03 | End: 2020-02-26 | Stop reason: HOSPADM

## 2020-02-03 RX ORDER — PANTOPRAZOLE SODIUM 40 MG/1
40 TABLET, DELAYED RELEASE ORAL
Status: DISCONTINUED | OUTPATIENT
Start: 2020-02-03 | End: 2020-02-26 | Stop reason: HOSPADM

## 2020-02-03 RX ORDER — ALBUTEROL SULFATE 90 UG/1
2 AEROSOL, METERED RESPIRATORY (INHALATION) EVERY 6 HOURS PRN
Status: DISCONTINUED | OUTPATIENT
Start: 2020-02-03 | End: 2020-02-26 | Stop reason: HOSPADM

## 2020-02-03 RX ORDER — LORAZEPAM 2 MG/ML
1 INJECTION INTRAMUSCULAR ONCE
Status: COMPLETED | OUTPATIENT
Start: 2020-02-03 | End: 2020-02-03

## 2020-02-03 RX ORDER — HYDROXYZINE HYDROCHLORIDE 50 MG/1
50 TABLET, FILM COATED ORAL DAILY PRN
Status: ON HOLD | COMMUNITY
End: 2020-02-26

## 2020-02-03 RX ORDER — FUROSEMIDE 40 MG
40 TABLET ORAL DAILY
Status: DISCONTINUED | OUTPATIENT
Start: 2020-02-03 | End: 2020-02-11

## 2020-02-03 RX ORDER — ONDANSETRON 2 MG/ML
4 INJECTION INTRAMUSCULAR; INTRAVENOUS EVERY 6 HOURS PRN
Status: DISCONTINUED | OUTPATIENT
Start: 2020-02-03 | End: 2020-02-26 | Stop reason: HOSPADM

## 2020-02-03 RX ORDER — ACETAMINOPHEN 325 MG/1
650 TABLET ORAL EVERY 4 HOURS PRN
Status: DISCONTINUED | OUTPATIENT
Start: 2020-02-03 | End: 2020-02-26 | Stop reason: HOSPADM

## 2020-02-03 RX ORDER — QUETIAPINE FUMARATE 50 MG/1
50 TABLET, FILM COATED ORAL
Status: DISCONTINUED | OUTPATIENT
Start: 2020-02-03 | End: 2020-02-26 | Stop reason: HOSPADM

## 2020-02-03 RX ORDER — ONDANSETRON 4 MG/1
4 TABLET, ORALLY DISINTEGRATING ORAL EVERY 6 HOURS PRN
Status: DISCONTINUED | OUTPATIENT
Start: 2020-02-03 | End: 2020-02-26 | Stop reason: HOSPADM

## 2020-02-03 RX ORDER — ALBUMIN (HUMAN) 12.5 G/50ML
12.5 SOLUTION INTRAVENOUS ONCE
Status: COMPLETED | OUTPATIENT
Start: 2020-02-03 | End: 2020-02-03

## 2020-02-03 RX ORDER — SODIUM CHLORIDE 9 MG/ML
1000 INJECTION, SOLUTION INTRAVENOUS CONTINUOUS
Status: DISCONTINUED | OUTPATIENT
Start: 2020-02-03 | End: 2020-02-03

## 2020-02-03 RX ORDER — OXYCODONE HYDROCHLORIDE 5 MG/1
5-10 TABLET ORAL
Status: DISCONTINUED | OUTPATIENT
Start: 2020-02-03 | End: 2020-02-25

## 2020-02-03 RX ORDER — NALOXONE HYDROCHLORIDE 0.4 MG/ML
.1-.4 INJECTION, SOLUTION INTRAMUSCULAR; INTRAVENOUS; SUBCUTANEOUS
Status: DISCONTINUED | OUTPATIENT
Start: 2020-02-03 | End: 2020-02-26 | Stop reason: HOSPADM

## 2020-02-03 RX ORDER — DULOXETIN HYDROCHLORIDE 60 MG/1
60 CAPSULE, DELAYED RELEASE ORAL DAILY
Status: ON HOLD | COMMUNITY
End: 2020-04-08

## 2020-02-03 RX ORDER — NICOTINE POLACRILEX 4 MG
15-30 LOZENGE BUCCAL
Status: DISCONTINUED | OUTPATIENT
Start: 2020-02-03 | End: 2020-02-24 | Stop reason: CLARIF

## 2020-02-03 RX ORDER — ACETAMINOPHEN 650 MG/1
650 SUPPOSITORY RECTAL EVERY 4 HOURS PRN
Status: DISCONTINUED | OUTPATIENT
Start: 2020-02-03 | End: 2020-02-26 | Stop reason: HOSPADM

## 2020-02-03 RX ORDER — LIDOCAINE HYDROCHLORIDE 10 MG/ML
10 INJECTION, SOLUTION EPIDURAL; INFILTRATION; INTRACAUDAL; PERINEURAL ONCE
Status: COMPLETED | OUTPATIENT
Start: 2020-02-03 | End: 2020-02-03

## 2020-02-03 RX ADMIN — SODIUM CHLORIDE 1000 ML: 9 INJECTION, SOLUTION INTRAVENOUS at 02:11

## 2020-02-03 RX ADMIN — SPIRONOLACTONE 100 MG: 25 TABLET ORAL at 13:04

## 2020-02-03 RX ADMIN — LORAZEPAM 1 MG: 2 INJECTION INTRAMUSCULAR; INTRAVENOUS at 06:31

## 2020-02-03 RX ADMIN — LORAZEPAM 0.5 MG: 2 INJECTION INTRAMUSCULAR; INTRAVENOUS at 02:28

## 2020-02-03 RX ADMIN — LORAZEPAM 1 MG: 1 TABLET ORAL at 10:26

## 2020-02-03 RX ADMIN — PROPRANOLOL HYDROCHLORIDE 10 MG: 10 TABLET ORAL at 21:06

## 2020-02-03 RX ADMIN — FLUTICASONE FUROATE AND VILANTEROL TRIFENATATE 1 PUFF: 200; 25 POWDER RESPIRATORY (INHALATION) at 10:27

## 2020-02-03 RX ADMIN — METOCLOPRAMIDE 5 MG: 5 INJECTION, SOLUTION INTRAMUSCULAR; INTRAVENOUS at 02:28

## 2020-02-03 RX ADMIN — FOLIC ACID: 5 INJECTION, SOLUTION INTRAMUSCULAR; INTRAVENOUS; SUBCUTANEOUS at 10:51

## 2020-02-03 RX ADMIN — MIRTAZAPINE 15 MG: 15 TABLET, FILM COATED ORAL at 21:06

## 2020-02-03 RX ADMIN — FUROSEMIDE 40 MG: 40 TABLET ORAL at 13:04

## 2020-02-03 RX ADMIN — PROPRANOLOL HYDROCHLORIDE 10 MG: 10 TABLET ORAL at 11:21

## 2020-02-03 RX ADMIN — ONDANSETRON 4 MG: 4 TABLET, ORALLY DISINTEGRATING ORAL at 10:26

## 2020-02-03 RX ADMIN — PANTOPRAZOLE SODIUM 40 MG: 40 TABLET, DELAYED RELEASE ORAL at 10:26

## 2020-02-03 RX ADMIN — LIDOCAINE HYDROCHLORIDE 10 ML: 10 INJECTION, SOLUTION EPIDURAL; INFILTRATION; INTRACAUDAL; PERINEURAL at 09:39

## 2020-02-03 RX ADMIN — ALBUMIN HUMAN 12.5 G: 0.25 SOLUTION INTRAVENOUS at 11:18

## 2020-02-03 RX ADMIN — LORAZEPAM 1 MG: 1 TABLET ORAL at 21:06

## 2020-02-03 RX ADMIN — TRAZODONE HYDROCHLORIDE 200 MG: 50 TABLET ORAL at 21:06

## 2020-02-03 ASSESSMENT — ENCOUNTER SYMPTOMS
ABDOMINAL DISTENTION: 1
ABDOMINAL PAIN: 1

## 2020-02-03 ASSESSMENT — MIFFLIN-ST. JEOR: SCORE: 1639.63

## 2020-02-03 NOTE — IR NOTE
RADIOLOGY POST PROCEDURE NOTE    Patient name: Jim Richard  MRN: [id number]  : 1955    Pre-procedure diagnosis: ascites  Post-procedure diagnosis: Same    Procedure Date/Time: February 3, 2020  10:06 AM  Procedure: paracentesis  Estimated blood loss: None  Specimen(s) collected with description: none    The patient tolerated the procedure well with no immediate complications.  Significant findings:none    See imaging dictation for procedural details.    Provider name: John Jimenes MD  Assistant(s):None

## 2020-02-03 NOTE — CONSULTS
[facility]  Gastroenterology Consultation         Jim Richard  [address] [facility] [address]  64 year old male    Admission Date/Time: 2/3/2020  Primary Care Provider: Talon Elias  Referring / Attending Physician:  Dr. Mark Gomez     We were asked to see the patient in consultation by Dr. Mark Gomez for evaluation of cirrhosis with ascites.      CC:     HPI:  Jim Richard is a 64 year old male who presented with suicidal ideation and alcohol intoxication.  He has a past medical history markable for cirrhosis with ascites, esophageal varices, depression, anxiety, alcohol use disorder, sleep apnea, COPD and anemia. He was hospitalized at [facility] approximately 1 month ago secondary to weakness in alcohol withdrawal.  He was discharged home and states was non compliant with medication because he did not think the prescriptions was right. He failed to f/u in the outpatient setting. He has been admitted 4 times in last year with multiple ED visits. He states he was sober 2 weeks and began drinking 750 ml of vodka daily. He states he stopped eating 3 days ago due to chronic diarrhea and has had bowel accidents. Has had problems with this in the past and has always been related to alcohol. Has stopped taking lactulose. He had abdominal distention and bloating. He states he felt he had a fever and chills as was diaphoretic. He denies chest pain or palpitations. Does admit to fatigue, shortness of breath and weakness. He has had prior EGD in 09/2019 for anemia and does have noted portal hypertension. His last colonoscopy done 08/2019 noted polyp and no other abnormal findings.    He is afebrile, normotensive, and has normal O2 stats. Labs note stable hemoglobin around 9-10. WCB 7.9, Plateltes 153, CMP notes albumin 2.7, T bili 1.6, , ALT 25, AST 61.  Lipase 369. Creatinine 1.01. INR 1.18, No ammonia. Alcohol 0.39.     Underwent ultrasound guided paracentesis  and removed 6850 mL of yellow serous fluid  ROS: A comprehensive ten point review of systems was negative aside from those in mentioned in the HPI.      PAST MED HX:  I have reviewed this patient's medical history and updated it with pertinent information if needed.   Past Medical History:   Diagnosis Date     Alcoholism (H) 1/14/2013    had treatment at [facility]     Anxiety      Coronary artery disease 09/09/2014    Nuclear stress test with slight fixed defect inferiorly, no ischemia     Depression     w/anxiety     Esophageal varices with bleeding 04/28/2018    6 varices banded on EGD     Heart attack (H)      Hepatomegaly     Fatty liver, chronic alcoholic     Hyperlipemia      Hypertension      JANIS on CPAP      Peripheral vascular disease (H)      PVD (peripheral vascular disease) (H)      SDH (subdural hematoma) (H) 04/26/2018    Right side, resolved spontaneously     Spinal stenosis        MEDICATIONS:   Prior to Admission Medications   Prescriptions Last Dose Informant Patient Reported? Taking?   DULoxetine (CYMBALTA) 60 MG capsule Past Week at Unknown time Self Yes Yes   Sig: Take 60 mg by mouth daily Stopped at last admission, but reports still taking.   QUEtiapine (SEROQUEL) 50 MG tablet Past Week at Unknown time Self No Yes   Sig: Take 1 tablet (50 mg) by mouth nightly as needed (sleep)   Patient taking differently: Take 50 mg by mouth nightly as needed (sleep) Reports taking almost every night when remembers.   albuterol (PROAIR HFA/PROVENTIL HFA/VENTOLIN HFA) 108 (90 BASE) MCG/ACT Inhaler prn Self Yes Yes   Sig: Inhale 2 puffs into the lungs every 6 hours as needed    fluticasone-vilanterol (BREO ELLIPTA) 200-25 MCG/INH inhaler Past Week at Unknown time Self Yes Yes   Sig: Inhale 1 puff into the lungs daily    folic acid (FOLVITE) 1 MG tablet Past Week at Unknown time Self No Yes   Sig: Take 1 tablet (1 mg) by mouth daily   hydrOXYzine (ATARAX) 50 MG tablet prn Self Yes Yes   Sig: Take 50 mg by  mouth daily as needed Stopped at last discharge, but reports taking as needed.   lactulose (CHRONULAC) 10 GM/15ML solution Not Taking at Unknown time Self No No   Sig: Take 15 mLs (10 g) by mouth 3 times daily   Patient not taking: Reported on 2/3/2020   mirtazapine (REMERON) 15 MG tablet Past Week at Unknown time Self No Yes   Sig: Take 1 tablet (15 mg) by mouth At Bedtime   multivitamin, therapeutic with minerals (THERA-VIT-M) TABS tablet Past Week at Unknown time Self No Yes   Sig: Take 1 tablet by mouth daily   order for DME  Self No No   Sig: Equipment being ordered: Walker Wheels () and Walker ()  Treatment Diagnosis: difficulty walking   pantoprazole (PROTONIX) 40 MG EC tablet Past Week at Unknown time Self No Yes   Sig: Take 1 tablet (40 mg) by mouth every morning (before breakfast)   propranolol (INDERAL) 10 MG tablet Not Taking at Unknown time Self No No   Sig: Take 1 tablet (10 mg) by mouth 2 times daily   Patient not taking: Reported on 2/3/2020   tamsulosin (FLOMAX) 0.4 MG capsule Past Week at Unknown time Self Yes Yes   Sig: Take 0.4 mg by mouth daily Was discontinued at last admission in 10/2019, but still taking per patient.   traZODone (DESYREL) 100 MG tablet Past Week at Unknown time Self No Yes   Sig: Take 1 tablet (100 mg) by mouth At Bedtime   Patient taking differently: Take 200 mg by mouth At Bedtime Rx written for 100 mg HS.   vitamin B1 (THIAMINE) 100 MG tablet Not Taking at Unknown time Self No No   Sig: Take 1 tablet (100 mg) by mouth daily   Patient not taking: Reported on 2/3/2020      Facility-Administered Medications: None       ALLERGIES:   Allergies   Allergen Reactions     Amlodipine Swelling     Lisinopril      Other reaction(s): Angioedema  Mouth and tongue swelling   Mouth and tongue swelling          SOCIAL HISTORY:  Social History     Tobacco Use     Smoking status: Current Every Day Smoker     Packs/day: 1.00     Types: Cigarettes     Smokeless tobacco: Never Used      Tobacco comment: Chantix caused nightmares   Substance Use Topics     Alcohol use: Yes     Comment: Last drank Yesterday drinks 750 ml of vodka daily     Drug use: No       FAMILY HISTORY:  Family History   Problem Relation Age of Onset     Mental Illness Son      Coronary Artery Disease Early Onset Mother      Substance Abuse Father      Lung Cancer Father      Substance Abuse Brother      Unknown/Adopted No family hx of      Depression No family hx of      Anxiety Disorder No family hx of      Schizophrenia No family hx of      Bipolar Disorder No family hx of      Suicide No family hx of      Dementia No family hx of      [place] Disease No family hx of      Parkinsonism No family hx of      Autism Spectrum Disorder No family hx of      Intellectual Disability (Mental Retardation) No family hx of        PHYSICAL EXAM:   General  Alert, oriented and visibly upset and crying  Vital Signs with Ranges  Temp: 99.2  F (37.3  C) Temp src: Oral BP: 136/67 Pulse: 81 Heart Rate: 86 Resp: 18 SpO2: 91 % O2 Device: None (Room air)    No intake/output data recorded.    Constitutional: alert, moderate distress, cooperative and pale   Cardiovascular: negative, PMI normal. No lifts, heaves, or thrills. RRR. No murmurs, clicks gallops or rub  Respiratory: negative, Percussion normal. Good diaphragmatic excursion. Lungs clear  Head: Normocephalic. No masses, lesions, tenderness or abnormalities  Neck: Neck supple. No adenopathy. Thyroid symmetric, normal size,, Carotids without bruits.  Abdomen: Abdomen soft, mildly tender. BS normal. No masses, organomegaly, positive findings: distended          ADDITIONAL COMMENTS:   I reviewed the patient's new clinical lab test results.   Recent Labs   Lab Test 02/03/20  0218 01/16/20  0748 01/14/20  0838  01/07/20  0750 12/31/19  1146   WBC 7.9 3.9* 3.7*   < > 4.8 5.5   HGB 9.6* 9.4* 9.9*   < > 9.4* 9.9*   MCV 97 96 98   < > 95 96    175 143*   < > 85* 205   INR 1.18*  --   --    --  1.26* 1.24*    < > = values in this interval not displayed.     Recent Labs   Lab Test 02/03/20  0218 01/16/20  0748 01/14/20  0838   POTASSIUM 3.6 4.0 3.8   CHLORIDE 108 107 105   CO2 21 24 24   BUN 24 30 34*   ANIONGAP 10 4 6     Recent Labs   Lab Test 02/03/20  0218 01/14/20  0838 01/11/20  1730 01/10/20  0807  11/13/19  1858 11/13/19  1849  02/13/19  2340  09/25/14  0510   ALBUMIN 2.7* 2.8*  --  2.3*   < > 3.0*  --    < > 3.1*   < >  --    BILITOTAL 1.6* 1.2  --  1.4*   < > 1.0  --    < > 0.4   < >  --    ALT 26 57  --  44   < > 24  --    < > 41   < >  --    AST 61* 78*  --  111*   < > 58*  --    < > 89*   < >  --    PROTEIN  --   --  Negative  --   --   --  30*  --   --   --  Negative   LIPASE 369  --   --   --   --  499*  --   --  536*   < >  --     < > = values in this interval not displayed.       I reviewed the patient's new imaging results.        CONSULTATION ASSESSMENT AND PLAN:    Jim Richard is a pleasant 64 year old male with history of alcohol abuse, alcoholic liver cirrhosis with ascites and esophageal varices with diarrhea for 3 days. GI consulted for continuation of care and history of cirrhosis.    Active Problems:    Alcohol withdrawal (H)  Alcohol liver cirrhosis with ascites and esophageal varices  Patient has had exacerbation of symptoms associated with excessive alcohol intake. Underwent ultrasound guided paracentesis and had 6750 mL of serous fluid removed. Hemoglobin stable and at baseline. Has been noncompliant with medication. Has been started on propranolol with history of esophageal varices. Has had lactulose held with diarrhea and stools incontinence. Has not had diuretics started. Creatinine at baseline at 1.01.    - Recommend to consider inpatient alcohol treatment given 4 admissions in last year for alcohol related problems  - Start spironolactone 100 mg and furosemide 40 mg daily  - Avoid lactulose until diarrhea resolves  - Continue with pantoprazole and propranolol  -  Draw ammonia level  - Daily labs  - If diarrhea continues can plan colonoscopy.          [facility].  Office: [phone]  Cell : [phone] (Dr. Shaw)  Cell: [phone] (Gloria De Leon PA-C)

## 2020-02-03 NOTE — PROGRESS NOTES
"[facility]    Hospitalist Progress Note      Assessment & Plan   Jim Richard is a 64 year old male who was admitted on 2/3/2020. His history is significant for cirrhosis (w/ ascites, esophageal varices), EtOH use disorder, depression/anxiety, COPD, sleep apnea, anemia, admitted for EtOH intoxication and SI.    He was recently hospitalized at [facility] for weakness in EtOH withdrawal, and was seen by GI, CD, and Psych. It appears the initial plan was for him to go to TCU w/ close GI f/u to adjust his diuretics, but he refused TCU and went home. Since going home he has been drinking, medication compliance sporadic, with worsening abdominal ascites. He came in for SI in the setting of drinking.    Cirrhosis with ascites  Esophageal varices  Diuretics held since last admission. Had paracentesis on 02/03/20 w/ 6.7 L serous fluid removed.  - GI recs:   - hold lactulose until diarrhea resolves.   - consider colonoscopy if diarrhea persists   - PTA pantoprazole   - PTA propranolol   - spironolactone 100 daily   - furosemide 40 daily  - pt noted to admitting physician that over decades the longest period of sobriety he has had is not longer than 90 days. He has been to myriad treatments and states \"nobody will take me anymore\". He also notes that \"my bladder is shot and my bowels are shot\". He is amenable to discussions with palliative care re: goals of care.    Depression with suicidal ideation  Anxiety  Told both Psych and me that he had SI before coming into hospital due to drinking, although no longer w/ SI.  - Psych recs:   - f/u w/ SW regarding hospital-based detox options, either [place] or [place]   - consider commitment if he demands to leave   - Seroquel 50 at bedtime PRN   - mirtazapine 15 at bedtime   - trazodone 200 at bedtime (listed as 100 at bedtime in pt's chart)  - pt also reports takes duloxetine at home although this has been discontinued. F/u w/ Psych regarding restarting  - CD " consult     Alcohol intoxication  Alcohol use disorder  Drinking 750 ml vodka daily. Etoh level on admission at 0.39. h/o DT's, no h/o withdrawal seizures.   - NS w/ thiamine & folic acid x5 doses (2/3-7)  - CIWA  - prn lorazepam     JANIS on CPAP  CPAP per home settings     COPD  Tobacco abuse  PTA prn albuterol, Breo Ellipta  - continue inhalers    Anemia  Baseline 9-10 recent. Presumed 2/2 Etoh, liver disease, varices      DVT Prophylaxis: Pneumatic Compression Devices  Code Status: DNR/DNI  Expected discharge: >2-3 days pending monitoring of EtOH withdrawal and dispo planning    Efrain Aceves MD  Text Page (7am - 6pm, M-F)    Interval History   +abdominal distention and discomfort.  +worsening ankle swelling  Denies SI since coming into hospital  No fevers, chest pain, SOB, N/V, tremors, agitation    -Data reviewed today: I reviewed all new labs and imaging results over the last 24 hours.      Physical Exam   Temp: 99.2  F (37.3  C) Temp src: Oral BP: 136/67 Pulse: 81 Heart Rate: 86 Resp: 18 SpO2: 94 % O2 Device: Nasal cannula Oxygen Delivery: 2 LPM  Vitals:    02/03/20 0800   Weight: 89.6 kg (197 lb 8.5 oz)     Vital Signs with Ranges  Temp:  [98.2  F (36.8  C)-99.2  F (37.3  C)] 99.2  F (37.3  C)  Pulse:  [80-95] 81  Heart Rate:  [86-91] 86  Resp:  [16-20] 18  BP: (123-151)/(56-86) 136/67  SpO2:  [88 %-96 %] 94 %  No intake/output data recorded.    General: well-appearing, NAD  HEENT: NC/AT, moist mucus membranes  Heart: RRR no m/r/g  Lungs: CTAB no crackles or wheezes  Abdomen: NABS, non-tender, tensely distended; no rebound or guarding; no HSM or masses.  Extremities: warm, well-perfused. 2+ bilateral ankle pitting edema.  Neuro: CN II-XII grossly intact, moving extremities spontaneously. No asterixis  Psych: normal mood and affect    Medications       fluticasone-vilanterol  1 puff Inhalation Daily     furosemide  40 mg Oral Daily     mirtazapine  15 mg Oral At Bedtime     [START ON 2/8/2020] multivitamin  w/minerals  1 tablet Oral Daily     pantoprazole  40 mg Oral QAM AC     propranolol  10 mg Oral BID     IV Fluid with vitamins   Intravenous Q24H     spironolactone  100 mg Oral Daily     traZODone  100 mg Oral At Bedtime       Data   Recent Labs   Lab 02/03/20  0218   WBC 7.9   HGB 9.6*   MCV 97      INR 1.18*      POTASSIUM 3.6   CHLORIDE 108   CO2 21   BUN 24   CR 1.01   ANIONGAP 10   KEV 7.9*   GLC 76   ALBUMIN 2.7*   PROTTOTAL 7.0   BILITOTAL 1.6*   ALKPHOS 286*   ALT 26   AST 61*   LIPASE 369       Recent Results (from the past 24 hour(s))   US Paracentesis    Narrative    ULTRASOUND GUIDED PARACENTESIS   2/3/2020 10:16 AM     HISTORY: Abdominal pain. Ascites.    PROCEDURE:   Informed consent was obtained from the patient prior to  the procedure with discussion including the possible risks of  bleeding, infection and organ injury . Using 5 mL of 1% lidocaine for  local anesthesia, sterile technique, and sonographic guidance with  permanent image documentation, I placed an 8F paracentesis catheter  into the peritoneal fluid collection. This was used to aspirate 6850  mL of yellow, serous fluid in vacuum bottles, and some of this was  sent for any laboratory studies that had been ordered. There were no  immediate complications.  Intravenous albumen replacement was  performed according to protocol.      Impression    IMPRESSION:  Ultrasound guided paracentesis.    GARLAND PEACOCK MD

## 2020-02-03 NOTE — ED TRIAGE NOTES
"Pt called for EMS from his home tonight. Pt reports drinking 0.75 vodka daily. Pt was c/o chest discomfort and abd pain. Pt reports needing to have his belly tapped as his liver is \"shot\". Doesn't recall the last time he ate any food. Pt reports wanting to die, denies any suicidal plan but thinks he is better off dead.   "

## 2020-02-03 NOTE — PROGRESS NOTES
DATE & TIME: 2020 0743-0707  Cognitive Concerns/ Orientation : A&Ox4  BEHAVIOR & AGGRESSION TOOL COLOR: Green  CIWA SCORE: 1000 (11) - PO Ativan, 1100 (7). Pt reports mild nausea, anxiety, and has minimal tremor. Pt denies seizure history for past admissions for alcohol withdrawal.   ABNL VS/O2: pt de-sats while sleeping to high 80s on RA. Pt reports JANIS hx. CPAP ordered for bedtime.   MOBILITY: Up with assist of 1 using GB and walker  PAIN MANAGMENT: Denies any pain on shift.   DIET: 2g NA diet/regular  BOWEL/BLADDER: continent, walking to bathroom with assist of 1 and GB + walker. Has bedside urinal.   ABNL LAB/B.39 BARNEY upon admission, elevated LFT's.   DRAIN/DEVICES: PIV  TELEMETRY RHYTHM: NA  SKIN: blanchable redness/rash to groin/scrotum  TESTS/PROCEDURES: paracentesis today at 0900. Took of 6850 of fluid, albumin administered. VSS.   D/C DAY/GOALS/PLACE: TBD  OTHER IMPORTANT INFO: Depressed but denies any continuous suicidal ideation. Bed side attendant discontinued by psychiatry.

## 2020-02-03 NOTE — PHARMACY-ADMISSION MEDICATION HISTORY
Pharmacy Medication History  Admission medication history interview status for the 2/3/2020  admission is complete. See EPIC admission navigator for prior to admission medications     Medication history sources: Patient, Surescripts, Care Everywhere and Discharge AVS from 1/16/2020  Medication history source reliability: Moderate  Adherence assessment: Poor - patient reports that he hasn't been taking his medications, then states he has taken recently but only as he remembers. Last filled most prescriptions 10/31/2019 for 30 day supply.    Significant changes made to the medication list:  - Duloxetine and hydroxyzine were discontinued on last discharge, but patient reports he still takes so added to list.  - Trazodone dose changed to 100 mg PO HS last admission, but patient states he still takes 200 mg PO HS (unsure what supply he has at home).    Additional medication history information:   - Patient reports he has not been taking propranolol, thiamine, and lactulose, but kept on list as patient was instructed to continue on last admission.  - Patient states he has not been taking lactulose as his stools are very soft and too frequent.    Medication reconciliation completed by provider prior to medication history? Yes    Time spent in this activity: 20 minutes      Prior to Admission medications    Medication Sig Last Dose Taking? Auth Provider   albuterol (PROAIR HFA/PROVENTIL HFA/VENTOLIN HFA) 108 (90 BASE) MCG/ACT Inhaler Inhale 2 puffs into the lungs every 6 hours as needed  prn Yes Unknown, Entered By History   DULoxetine (CYMBALTA) 60 MG capsule Take 60 mg by mouth daily Stopped at last admission, but reports still taking. Past Week at Unknown time Yes Unknown, Entered By History   fluticasone-vilanterol (BREO ELLIPTA) 200-25 MCG/INH inhaler Inhale 1 puff into the lungs daily  Past Week at Unknown time Yes Unknown, Entered By History   folic acid (FOLVITE) 1 MG tablet Take 1 tablet (1 mg) by mouth daily Past  Week at Unknown time Yes Tiff Rodriguez MD   hydrOXYzine (ATARAX) 50 MG tablet Take 50 mg by mouth daily as needed Stopped at last discharge, but reports taking as needed. prn Yes Unknown, Entered By History   mirtazapine (REMERON) 15 MG tablet Take 1 tablet (15 mg) by mouth At Bedtime Past Week at Unknown time Yes Tiff Rodriguez MD   multivitamin, therapeutic with minerals (THERA-VIT-M) TABS tablet Take 1 tablet by mouth daily Past Week at Unknown time Yes Jude Duarte DO   pantoprazole (PROTONIX) 40 MG EC tablet Take 1 tablet (40 mg) by mouth every morning (before breakfast) Past Week at Unknown time Yes Tiff Rodriguez MD   QUEtiapine (SEROQUEL) 50 MG tablet Take 1 tablet (50 mg) by mouth nightly as needed (sleep)  Patient taking differently: Take 50 mg by mouth nightly as needed (sleep) Reports taking almost every night when remembers. Past Week at Unknown time Yes Piter Sequeira DO   tamsulosin (FLOMAX) 0.4 MG capsule Take 0.4 mg by mouth daily Was discontinued at last admission in 10/2019, but still taking per patient. Past Week at Unknown time Yes Unknown, Entered By History   traZODone (DESYREL) 100 MG tablet Take 1 tablet (100 mg) by mouth At Bedtime  Patient taking differently: Take 200 mg by mouth At Bedtime Rx written for 100 mg HS. Past Week at Unknown time Yes Piter Sequeira DO   lactulose (CHRONULAC) 10 GM/15ML solution Take 15 mLs (10 g) by mouth 3 times daily  Patient not taking: Reported on 2/3/2020 Not Taking at Unknown time  Piter Sequeira DO   order for DME Equipment being ordered: Walker Wheels () and Walker ()  Treatment Diagnosis: difficulty walking   Donell Lucas MD   propranolol (INDERAL) 10 MG tablet Take 1 tablet (10 mg) by mouth 2 times daily  Patient not taking: Reported on 2/3/2020 Not Taking at Unknown time  Tiff Rodriguez MD   vitamin B1 (THIAMINE) 100 MG tablet Take 1 tablet (100 mg) by mouth daily  Patient not taking: Reported on 2/3/2020 Not  Taking at Unknown time  Tiff Rodriguez MD

## 2020-02-03 NOTE — ED NOTES
69 year old woman with a hx. of atrial fibrillation, recurrent, significant recurrent anemia of uncertain etiology who was referred for Watchman implantation.  Her procedure was successfully done, she was admitted overnight for observation and was discharged home the next day.  Disposition is to home with plans for repeat visit in the Watchman clinic and for a repeat BARRON to examine the device.  The patient is to follow-up also in the anticoagulation clinic.           Electronically Signed On 11/22/2018 14:00  __________________________________________________   KAWASAKI-MD, RAYMOND     [facility]  ED Nurse Handoff Report    ED Chief complaint: Alcohol Intoxication; Suicidal; and Ascites      ED Diagnosis:   Final diagnoses:   Alcoholic cirrhosis of liver with ascites (H)   Alcoholic intoxication with complication (H)   Suicidal ideation       Code Status: Full Code    Allergies:   Allergies   Allergen Reactions     Amlodipine Swelling     Lisinopril      Other reaction(s): Angioedema  Mouth and tongue swelling   Mouth and tongue swelling          Patient Story: pt c/o abd pain due to acites.  Focused Assessment:  Pt has been drinking and needs his belly tapped and drained,    Treatments and/or interventions provided: ativan 1mg and reglan 5mg  Patient's response to treatments and/or interventions: good    To be done/followed up on inpatient unit:  nothing notedat this time    Does this patient have any cognitive concerns?: fine    Activity level - Baseline/Home:  Walker  Activity Level - Current:   Walker    Patient's Preferred language: English   Needed?: No    Isolation: None  Infection: Not Applicable  Bariatric?: No    Vital Signs:   Vitals:    02/03/20 0215 02/03/20 0300 02/03/20 0400 02/03/20 0600   BP: (!) 142/86 123/56  123/57   Pulse: 87 95  89   Resp:    20   Temp:       TempSrc:       SpO2: 94%  93% 94%       Cardiac Rhythm:     Was the PSS-3 completed:   Yes  What interventions are required if any?       Required Interventions: Provider notified;Room searched       Family Comments: none  OBS brochure/video discussed/provided to patient/family: No              Name of person given brochure if not patient: na              Relationship to patient: na    For the majority of the shift this patient's behavior was Green.   Behavioral interventions performed were none.    ED NURSE PHONE NUMBER: [id number]

## 2020-02-03 NOTE — ED NOTES
Bed: [facility]  Expected date:   Expected time:   Means of arrival:   Comments:  439  64M ETOH/Suicidal  9612

## 2020-02-03 NOTE — ED PROVIDER NOTES
History   Chief Complaint:  Alcohol Intoxication, Suicidal Ideation, and Abdominal Distention     The history is provided by the patient.      Jim Richard is a 64 year old male with a history of alcoholism, ascites, COPD, hypertension, and hyperlipidemia who presents to the emergency department today via EMS for evaluation of alcohol intoxication, suicidal ideation, and abdominal distention. The patient states that he has had increasing abdominal distention and abdominal pain since his last paracentesis which was during his previous admission from 01/07 to 01/16. The patient reports that he has continued to drink since discharge, endorsing approximately 750 mL of vodka daily. Tonight he states he is feeling suicidal and called EMS to his home after he had been drinking. Further, the patient states he has been having some chest pains. The patient denies harming himself in any way and states he does not have an active suicide plan.      Allergies:  Amlodipine  Lisinopril    Medications:   Albuterol inhaler  Folic acid  Lactulose  Remeron  Protonix  Propanolol   Seroquel  Tamsulosin  Trazodone    Past Medical History:    Alcoholism   Anxiety  Coronary artery disease  Depression   Esophageal varices with bleeding  Heart attack  Hepatomegaly  Hyperlipidemia  Hypertension   Obstructive sleep apnea, on CPAP  Peripheral vascular disease  Subdural hematoma  Spinal stenosis  Chronic back pain   Alcoholic hepatitis  Esophageal varices   COPD  Suicidal ideation   Chemical dependency   Tremor   Chronic pain disorder   Periphery artery disease     Past Surgical History:    Appendectomy   Bypass graft, femoropopliteal  Endarterectomy, femoral   Abdominal hernia repair  Tonsillectomy and adenoidectomy  Laminectomy, fusion lumbar 3+ level combined      Family History:    Coronary artery disease- Mother (early onset)  Substance abuse  Cancer- lung  Substance abuse  Mental illness    Social History:  The patient was not  accompanied to the ED.  Smoking Status: Positive, 1 PPD  Smokeless Tobacco: Never Used  Alcohol Use: Positive, 750 mL vodka/day  Drug Use: Negative    PCP: Talon Elias A     Review of Systems   Cardiovascular: Positive for chest pain.   Gastrointestinal: Positive for abdominal distention and abdominal pain.   Psychiatric/Behavioral: Positive for suicidal ideas. Negative for self-injury.   All other systems reviewed and are negative.        Physical Exam     Patient Vitals for the past 24 hrs:   BP Temp Temp src Pulse Resp SpO2   02/03/20 0215 (!) 142/86 -- -- 87 -- 94 %   02/03/20 0157 (!) 150/67 98.2  F (36.8  C) Oral 91 18 96 %     Physical Exam    Constitutional: The patient is oriented to person, place, and time. Intoxicated. Smells of alcohol.  HENT:   Head: Atraumatic  Right Ear: Normal  Left Ear: Normal  Nose: Nose normal.   Mouth/Throat: Oropharynx is clear and moist. No erythema or exudate.   Eyes: Conjunctivae and EOM are normal. Pupils are equal, round, and reactive to light. No discharge  Neck: Normal range of motion. Neck supple.   Cardiovascular: Normal rate, regular rhythm, no murmur gallops or rubs. Intact distal pulses.    Pulmonary/Chest: CTA bilaterally. No wheezes rale or rhonchi.  Abdominal: Abdomen is markedly distended with obvious fluid wave. Tender to palpation. No focal tenderness.  No masses   Musculoskeletal: No edema. No bony deformity. Normal range of motion  Lymphadenopathy:     The patient has no cervical adenopathy.   Neurological: The patient is alert and oriented to person, place, and time. The patient has normal strength and normal reflexes. No cranial nerve deficit. Coordination normal.  Skin: Skin is warm and dry. No rash noted. The patient is not diaphoretic.   Psychiatric: Admits to suicidal ideation but no plan. The patient has a normal mood and affect.    Emergency Department Course     ECG:  ECG taken at 0326, ECG read at 0332  Normal sinus rhythm   Right bundle  branch block  Abnormal ECG   Rate 93 bpm. MD interval 156. QRS duration 142. QT/QTc 400/497. P-R-T axes 61 -29 45.     Laboratory:  Laboratory findings were communicated with the patient who voiced understanding of the findings.  CBC: WNL (WBC 7.9, HGB 9.6, )  CMP: Calcium 7.9 (L), bilirubin total 1.6 (H), albumin 2.7 (L), alkaline phosphatase 286 (H), AST 61 (H). O/w WNL (Creatinine 1.01)   Lipase: 369    Magnesium: 1.6  INR: 1.18 (H)  Alcohol level, blood: 0.39 (H)    Interventions:  0211 0.9% NaCl 1L IV Bolus   0228 Reglan 5 mg IV  0228 Lorazepam 0.5 mg IV    Emergency Department Course:  0159 Nursing notes and vitals reviewed.  0204 I performed an exam of the patient as documented above.   0219 IV was inserted and blood was drawn for laboratory testing, results above.   0316 I spoke with Dr. Gomez of the hospitalist service from [facility] regarding patient's presentation, findings, and plan of care.    0326     Findings and plan explained to the Patient who consents to admission. Discussed the patient with Dr. Gomez, who will admit the patient to a suds bed for further monitoring, evaluation, and treatment.     I personally reviewed the laboratory results with the Patient and answered all related questions prior to admission.     Impression & Plan      Medical Decision Making:    Jim Richard is a 64 year old male who presents with intoxication, abdominal discomfort, and suicidal ideation. The patient is well known to the emergency department and was last seen on January 7th when he came in for alcohol withdrawal and paracentesis due to ascites. He has a similar presentation tonight, he has continued to drink even after discharge, approximately a liter of vodka a day. He states he comes in because he has been unable to control his abdominal pain. His last paracentesis was approximately three weeks ago. He has had no fever. On physical exam his abdomen is markedly distended with obvious fluid  wave, consistent with ascites. He is mildly tender but no focal tenderness. No fever. Laboratory examination other than a blood alcohol level of 0.39 is otherwise unremarkable with no signs of electrolyte imbalance or renal failure. The patient does report being suicidal, but denies any specific plan and this is consistent with previous presentations where he has states he is suicidal but with no clear plan. At this point in time, I do feel admission is warranted for a non-emergent paracentesis in the morning, and a potential psych evaluation versus discharge home.     Diagnosis:    ICD-10-CM    1. Alcoholic cirrhosis of liver with ascites (H) K70.31    2. Alcoholic intoxication with complication (H) F10.929        Disposition:   The patient is admitted into the care of Dr. Gomez.     Scribe Disclosure:  Elaine VALENTE, am serving as a scribe at 2:06 AM on 2/3/2020 to document services personally performed by Felipe Sawant MD based on my observations and the provider's statements to me.   [facility] EMERGENCY DEPARTMENT        Felipe Sawant MD  02/03/20 0574

## 2020-02-03 NOTE — ED NOTES
Pt has been pushing the call light every 3 to 12 minutes during this authors time from 0300 till 0643. Pt told that this cannot continue at this rate non non urgent issues. Pt reports for the third time that this has been discussed with the pt that he is sorry, but he is just restless from not sleeping. Pt was given 1 mg ativan a few minutes ago and once again he pushes the call light. Once again 5 minutes later pt pushes the call light and once again was asked to please discontinue using the call light constantly from boredom.

## 2020-02-03 NOTE — PROGRESS NOTES
Care Suites Procedure Nursing Note    Patient Information  Name: Jim Richard  Age: 64 year old    Procedure  Procedure: paracentesis  Procedure start time: 0925  Procedure complete time: 1000  Concerns/abnormal assessment: na  If abnormal assessment, provider notified: No  Plan/Other: proceed. 6850cc of Yellow fluid drained.   from LLQ. Dermabond applied to site and dsg CDI. Pt returned to floor with transport per cart. Called report to RN on floor.    Brad Mccullough RN

## 2020-02-03 NOTE — ED NOTES
Patient will be boarding in ED until dayshift due to unit bed capacity per ANS; ED provider aware.

## 2020-02-03 NOTE — CONSULTS
"Consult Date:  02/03/2020      PSYCHIATRY CONSULTATION      REQUESTING PHYSICIAN:  Dr. Efrain Aceves      REASON FOR CONSULTATION:  Worsening depression, anxiety, alcohol abuse, help with psych meds and treatment.      IDENTIFYING DATA:  The patient is a 64-year-old gentleman who is DNR/DNI, well known to Psychiatry who has an established history of an alcohol use disorder and very problematic medical problems.  He comes in again intoxicated with a blood alcohol level of 0.39.  This is his fourth admission here at [facility] since 11/19/2019.  When he came in, apparently he expressed some suicidal thinking, but he denies that currently.      CHIEF COMPLAINT:  \"Nobody will take me.\"      HISTORY OF PRESENT ILLNESS:  The patient is a 64-year-old gentleman with an established history of alcohol use disorder, again admitted to the hospital intoxicated.  He has multiple alcohol-related complications of COPD and obstructive sleep apnea.  His blood alcohol level on admission was 0.39.  He has been drinking about 3/4 of a liter of liquor per day and typically takes some Seroquel, trazodone and Remeron.  His compliance with medication has been poor.  Efforts have been undertaken to try to get him into treatment, but there is either no availability or he does not follow through.  He says he tried to get into [facility] and [facility] and neither would take him.  It is unclear to me whether this is related to lack of availability or the complexity of his medical problems.      On interview, the patient smells of stale alcohol.  He looks frail, disheveled and tremulous.  He denies active suicidal thinking.  He says he is agreeable to staying in the hospital.  He says he wants to get treatment, but needs help getting there.  He states, \"I didn't drive, that was a good decision.\"  He acknowledges he has a problem with alcohol, but has had little to no success trying to refrain from his use.  He often refuses to go to " transitional care when these things are recommended.  He was last seen for consultation by Dr. Brito on 01/07/2020.  He has lack of a good support system, which is a stressor to him.  He endorses depressed mood, some vegetative symptoms of depression and some hopelessness.  He readily contracts for safety in the hospital.      PAST PSYCHIATRIC HISTORY:  Includes a diagnosis of major depressive disorder with no previous inpatient hospitalizations, one remote suicide attempt in the distant past.      PAST CHEMICAL DEPENDENCY HISTORY:  Notable for intractable alcohol dependence with progressive medical problems.  He has esophageal varices, cirrhosis and ascites.  He has never had seizures, but has gone through significant withdrawal and appears to have declining health.      PAST MEDICAL HISTORY:  Per chart review includes cirrhosis with ascites, COPD, obstructive sleep apnea, anemia.      PRIOR TO ADMISSION MEDICATIONS:  Seroquel 50 mg each day at bedtime, Breo Ellipta, ProAir, Chronulac, multivitamin, Protonix, Inderal, Flomax, trazodone 100 mg at bedtime, Remeron 15 mg at bedtime.      FAMILY AND SOCIAL HISTORY:  The patient is single.  There is no notable mental illness or chemical dependency in the family.  Social Service assessment details his social situation, but I know that he lives alone with little social support at this time.      A 10-point review of systems is unchanged from Dr. Gomez's H and P 02/03/2020 at 6:07 a.m.      MOST RECENT VITAL SIGNS:  Temp 99.2, pulse 81, respiratory rate 18, blood pressure 136/67, oxygen saturation 91%.      MENTAL STATUS EXAMINATION:  Appearance:  The patient is a disheveled man who is sitting bedside.  He is a poor historian.  Speech is notable for a slight stammer.  Use of language appropriate.  Motor exam is tremulous.  Coordination, station and gait impaired.  He is unsteady on his feet and needs assistance.  Muscle strength and tone diminished.  Affect is  subdued.  Mood depressed.  Thought process logical, coherent and goal directed.  No loosening of associations, no flight of ideas.  No formal thought disorder on exam.  Thought content negative for hallucinations, delusions, paranoia, suicidal or homicidal ideation.  When questioned about his suicidal thoughts, he indicates these were in the context of intoxication and he no longer feels suicidal.  Insight and judgment poor.  Cognitive exam:  The patient is alert and oriented x2 out of 3.  Concentration poor.  Recent memory intact.  Remote memory grossly intact.  General fund of knowledge average.      IMPRESSION:  The patient is a 64-year-old man with intractable alcohol dependence and multiple complications from this.  He needs a hospital-based chemical dependency program.  If none is available, likely needs some sort of transitional care.  I am not certain we can keep letting him cycle in and out of the hospital, at the same time committing him may accomplish nothing if we do not have a reasonable disposition.      DIAGNOSES:   1.  Alcohol use disorder, severe.   2.  Multiple medical comorbidities secondary to alcohol use.   3.  Major depressive disorder by history.      PLAN:   1.  Medical management and detox.   2.  Confer with  regarding hospital based options, either [facility] or [facility].   3.  If he demands to leave in the immediate future given the recidivism and perilous nature of his drinking along with countless medical problems, commitment may be worth considering.  I think it is important we know what our options are before moving in that direction.   4.  Would resume his usual outpatient psychiatric meds including Seroquel, Remeron and trazodone when deemed appropriate.         MONAE SANCHEZ MD             D: 2020   T: 2020   MT: JERRY      Name:     DEVONTE SEGURA   MRN:      [id number]        Account:       [id number]   :      1955           Consult Date:   02/03/2020      Document: [id number]       cc: Talon Elias MD

## 2020-02-03 NOTE — ED NOTES
DATE:  2/3/2020   TIME OF RECEIPT FROM LAB:  0304  LAB TEST:  ETOH  LAB VALUE:  0.39  RESULTS GIVEN WITH READ-BACK TO (PROVIDER):  Dr. Sawant  TIME LAB VALUE REPORTED TO PROVIDER:   0307

## 2020-02-03 NOTE — PLAN OF CARE
DATE & TIME: 2020 [id number]  Cognitive Concerns/ Orientation : A&Ox4  BEHAVIOR & AGGRESSION TOOL COLOR: Green  CIWA SCORE: 1000 (11) - PO Ativan, 1100 (7). Pt reports mild nausea, anxiety, and has minimal tremor. Pt denies seizure history for past admissions for alcohol withdrawal.   ABNL VS/O2: pt de-sats while sleeping to high 80s on RA. Pt reports JANIS hx. CPAP ordered for bedtime.   MOBILITY: Up with assist of 1 using GB and walker  PAIN MANAGMENT: Denies any pain on shift.   DIET: 2g NA diet/regular  BOWEL/BLADDER: continent, walking to bathroom. Has bedside urinal.   ABNL LAB/B.39 BARNEY upon admission, elevated LFT's.   DRAIN/DEVICES: PIV  TELEMETRY RHYTHM: NA  SKIN: blanchable redness/rash to groin/scrotum  TESTS/PROCEDURES: paracentesis today at 0900. Took of 6850 of fluid, albumin administered. VSS.   D/C DAY/GOALS/PLACE: TBD  OTHER IMPORTANT INFO: Depressed but denies any continuous suicidal ideation. Bed side attendant discontinued by psychiatry.

## 2020-02-03 NOTE — H&P
"[facility]    History and Physical  Hospitalist       Date of Admission:  2/3/2020  Date of Service (when I saw the patient): 02/03/20    Assessment & Plan   Jim Richard is a 64 year old male who presents with alcohol intoxication, suicidal ideation    Cirrhosis with ascites  Esophageal varices  PTA lactulose 10 g TID, propranolol 10 mg BID  T bili up sl at 1.6, AST 61. Diuretics had been held at the time of discharge from his last admission.   - therapeutic and diagnostic paracentesis (r/o SBP)  - hold lactulose per pt request (loose stools without)  - will ask GI to see re: restart diuretics  - discussed with patient. He clearly has advanced liver and lung disease. He notes that over decades the longest period of sobriety he has had is not longer than 90 days. He has been to myriad treatments and states \"nobody will take me anymore\". He also notes that \"my bladder is shot and my bowels are shot\". He is amenable to discussions with palliative care re: goals of care.    Depression with suicidal ideation  Anxiety  PTA mirtazapine 15 mg at HS, seroquel 50 mg daily, trazodone 100 mg at HS  Endorses SI on presentation. Poor medication compliance. Psych had seen previous hospitalization one month ago. He denies ongoing SI in the ED.   - resume meds    Alcohol intoxication  Alcohol use disorder  Drinking 750 ml vodka daily. Etoh level on admission at 0.39. h/o DT's, no h/o withdrawal seizures.   - IV vitamins  - CIWA  - prn lorazepam    JANIS on CPAP  CPAP per home settings    COPD  Tobacco abuse  PTA prn albuterol, Breo Ellipta  - continue inhalers    Anemia  Baseline 9-10 recent. Presumed 2/2 Etoh, liver disease, varices    DVT Prophylaxis: Pneumatic Compression Devices  Code Status: DNR / DNI    Disposition: Expected discharge in 1-2 days pending palliative and GI consults    Mark Gomez MD  [phone] (P)  Text Page     Primary Care Physician   FERNANDA BRANTLEY    Chief Complaint " "  Suicidal ideation and alcohol intoxication    History is obtained from the patient and medical records    History of Present Illness   Jim Richard is a 64 year old male who presents with suicidal ideation and alcohol intoxication.  He has a past medical history markable for cirrhosis with ascites, esophageal varices, depression, anxiety, alcohol use disorder, sleep apnea, COPD and anemia.  He was recently hospitalized at [facility] approximately 1 month ago secondary to weakness in alcohol withdrawal.  During that hospital stay GI became involved.  Chemical dependency also consulted as well as psychiatry.  It appears the plan initially had been for him to go to TCU with close follow-up with GI to adjust his diuretics.  He refused TCU and went home.  Since that time he has been drinking.  His medication compliance is been sporadic.  He has had worsening abdominal ascites.  He has not been taking his lactulose as he states he already has incontinence of stool.  He endorses fevers and chills as well as diaphoresis but he relates this to his alcohol.  He has shortness of breath when he is not using his inhalers regularly.  He has no chest pain.  He has lower extremity edema.  He states anything that he drinks goes into his abdominal cavity worsening his ascites.      Discussing his mood, he states that he had transient suicidal ideation.  He says he does not have a plan.  He states this happens occasionally.   in the emergency department he states his mood is better.    He reports chronic longstanding issues with alcohol abuse.  He states he has been drinking for many decades.  He reports his longest period of sobriety is not much longer than 90 days.  He reports that he has been to treatment myriad times.  He desires to go back to treatment but states that \"not even [facility] will take me \".    Past Medical History    I have reviewed this patient's medical history and updated it with pertinent information if " needed.   Past Medical History:   Diagnosis Date     Alcoholism (H) 1/14/2013    had treatment at [facility]     Anxiety      Coronary artery disease 09/09/2014    Nuclear stress test with slight fixed defect inferiorly, no ischemia     Depression     w/anxiety     Esophageal varices with bleeding 04/28/2018    6 varices banded on EGD     Heart attack (H)      Hepatomegaly     Fatty liver, chronic alcoholic     Hyperlipemia      Hypertension      JANIS on CPAP      Peripheral vascular disease (H)      PVD (peripheral vascular disease) (H)      SDH (subdural hematoma) (H) 04/26/2018    Right side, resolved spontaneously     Spinal stenosis        Past Surgical History   I have reviewed this patient's surgical history and updated it with pertinent information if needed.  Past Surgical History:   Procedure Laterality Date     APPENDECTOMY       BYPASS GRAFT FEMOROPOPLITEAL  6/12/2012    Procedure: BYPASS GRAFT FEMOROPOPLITEAL;  LEFT FEMORAL TO ABOVE KNEE POPLITEAL BYPASS, ENDARTERECTOMY OF SFA AND PF ARTERIES, LEFT EXTERNAL ILIAC TO COMMON FEMORAL INTERPOSITION GRAFT;  Surgeon: Damir Roberts MD;  Location:  OR     COLONOSCOPY       COLONOSCOPY N/A 8/8/2019    Procedure: COLONOSCOPY;  Surgeon: Pavan Arguello MD;  Location:  GI     ENDARTERECTOMY FEMORAL  6/12/2012    Procedure: ENDARTERECTOMY FEMORAL;;  Surgeon: Damir Roberts MD;  Location:  OR     ESOPHAGOSCOPY, GASTROSCOPY, DUODENOSCOPY (EGD), COMBINED N/A 3/22/2018    Procedure: COMBINED ESOPHAGOSCOPY, GASTROSCOPY, DUODENOSCOPY (EGD);  ESOPHAGOSCOPY, GASTROSCOPY, DUODENOSOCPY.;  Surgeon: Valeri Pruitt MD;  Location:  OR     ESOPHAGOSCOPY, GASTROSCOPY, DUODENOSCOPY (EGD), COMBINED N/A 9/29/2018    Procedure: COMBINED ESOPHAGOSCOPY, GASTROSCOPY, DUODENOSCOPY (EGD);;  Surgeon: Rosendo Shaw MD;  Location:  GI     ESOPHAGOSCOPY, GASTROSCOPY, DUODENOSCOPY (EGD), COMBINED N/A 8/2/2019    Procedure: ESOPHAGOGASTRODUODENOSCOPY (EGD);   Surgeon: Pavan Arguello MD;  Location:  GI     ESOPHAGOSCOPY, GASTROSCOPY, DUODENOSCOPY (EGD), COMBINED N/A 9/25/2019    Procedure: ESOPHAGOGASTRODUODENOSCOPY (EGD);  Surgeon: Pavan Arguello MD;  Location:  GI     HERNIA REPAIR  2017    Abdominal     LAMINECTOMY, FUSION LUMBAR THREE+ LEVEL, COMBINED N/A 9/22/2014    Procedure: COMBINED LAMINECTOMY, FUSION LUMBAR THREE+ LEVEL;  Surgeon: Sebastien Pruitt MD;  Location:  OR     TONSILLECTOMY & ADENOIDECTOMY         Prior to Admission Medications   Prior to Admission Medications   Prescriptions Last Dose Informant Patient Reported? Taking?   QUEtiapine (SEROQUEL) 50 MG tablet   No No   Sig: Take 1 tablet (50 mg) by mouth nightly as needed (sleep)   albuterol (PROAIR HFA/PROVENTIL HFA/VENTOLIN HFA) 108 (90 BASE) MCG/ACT Inhaler  Self Yes No   Sig: Inhale 2 puffs into the lungs every 6 hours as needed    fluticasone-vilanterol (BREO ELLIPTA) 200-25 MCG/INH oral inhaler  Self Yes No   Sig: Inhale 1 puff into the lungs daily    folic acid (FOLVITE) 1 MG tablet  Self No No   Sig: Take 1 tablet (1 mg) by mouth daily   lactulose (CHRONULAC) 10 GM/15ML solution   No No   Sig: Take 15 mLs (10 g) by mouth 3 times daily   mirtazapine (REMERON) 15 MG tablet  Self No No   Sig: Take 1 tablet (15 mg) by mouth At Bedtime   multivitamin, therapeutic with minerals (THERA-VIT-M) TABS tablet  Self No No   Sig: Take 1 tablet by mouth daily   order for DME  Self No No   Sig: Equipment being ordered: Walker Wheels () and Walker ()  Treatment Diagnosis: difficulty walking   pantoprazole (PROTONIX) 40 MG EC tablet  Self No No   Sig: Take 1 tablet (40 mg) by mouth every morning (before breakfast)   propranolol (INDERAL) 10 MG tablet  Self No No   Sig: Take 1 tablet (10 mg) by mouth 2 times daily   Patient taking differently: Take 10 mg by mouth 2 times daily Patient thinks he may be taking once daily but unsure   tamsulosin (FLOMAX) 0.4 MG capsule  Self Yes No   Sig: Take 0.4  mg by mouth daily Was discontinued at last admission in 10/2019, but still taking per patient.   traZODone (DESYREL) 100 MG tablet   No No   Sig: Take 1 tablet (100 mg) by mouth At Bedtime   vitamin B1 (THIAMINE) 100 MG tablet  Self No No   Sig: Take 1 tablet (100 mg) by mouth daily      Facility-Administered Medications: None     Allergies   Allergies   Allergen Reactions     Amlodipine Swelling     Lisinopril      Other reaction(s): Angioedema  Mouth and tongue swelling   Mouth and tongue swelling          Social History   I have reviewed this patient's social history and updated it with pertinent information if needed. Jim Kylecarlitadougie  reports that he has been smoking cigarettes. He has been smoking about 1.00 pack per day. He has never used smokeless tobacco. He reports current alcohol use. He reports that he does not use drugs.    Family History   I have reviewed this patient's family history and updated it with pertinent information if needed.   Family History   Problem Relation Age of Onset     Mental Illness Son      Coronary Artery Disease Early Onset Mother      Substance Abuse Father      Lung Cancer Father      Substance Abuse Brother      Unknown/Adopted No family hx of      Depression No family hx of      Anxiety Disorder No family hx of      Schizophrenia No family hx of      Bipolar Disorder No family hx of      Suicide No family hx of      Dementia No family hx of      Anthony Disease No family hx of      Parkinsonism No family hx of      Autism Spectrum Disorder No family hx of      Intellectual Disability (Mental Retardation) No family hx of        Review of Systems   The 10 point Review of Systems is negative other than noted in the HPI or here.     Physical Exam   Temp: 98.2  F (36.8  C) Temp src: Oral BP: (!) 142/86 Pulse: 87   Resp: 18 SpO2: 94 %      Vital Signs with Ranges  0 lbs 0 oz    Constitutional: alert, oriented and in no acute distress  Eyes: EOMI, PERRL  HEENT: OP  clear  Respiratory: CTA B without w/c  Cardiovascular: diffuse wheezing throughout lungs  GI: distended and tense, tender to palpation diffusely, no g/r  Lymph/Hematologic: no cervical LAD  Genitourinary: deferred  Skin: no rashes or lesions grossly  Musculoskeletal: no deformities or arthritis  Neurologic: CN II-XII, BADILLO, sensation grossly intact  Psychiatric: mood and affect wnl    Data   Data reviewed today:  I personally reviewed no images or EKG's today.  Recent Labs   Lab 02/03/20  0218   WBC 7.9   HGB 9.6*   MCV 97      INR 1.18*      POTASSIUM 3.6   CHLORIDE 108   CO2 21   BUN 24   CR 1.01   ANIONGAP 10   KEV 7.9*   GLC 76   ALBUMIN 2.7*   PROTTOTAL 7.0   BILITOTAL 1.6*   ALKPHOS 286*   ALT 26   AST 61*   LIPASE 369       No results found for this or any previous visit (from the past 24 hour(s)).

## 2020-02-04 LAB
ALBUMIN SERPL-MCNC: 2.3 G/DL (ref 3.4–5)
ALP SERPL-CCNC: 234 U/L (ref 40–150)
ALT SERPL W P-5'-P-CCNC: 22 U/L (ref 0–70)
AMMONIA PLAS-SCNC: 37 UMOL/L (ref 10–50)
ANION GAP SERPL CALCULATED.3IONS-SCNC: 7 MMOL/L (ref 3–14)
AST SERPL W P-5'-P-CCNC: 53 U/L (ref 0–45)
BILIRUB SERPL-MCNC: 2.5 MG/DL (ref 0.2–1.3)
BUN SERPL-MCNC: 23 MG/DL (ref 7–30)
CALCIUM SERPL-MCNC: 7.6 MG/DL (ref 8.5–10.1)
CHLORIDE SERPL-SCNC: 104 MMOL/L (ref 94–109)
CO2 SERPL-SCNC: 23 MMOL/L (ref 20–32)
CREAT SERPL-MCNC: 1.09 MG/DL (ref 0.66–1.25)
GFR SERPL CREATININE-BSD FRML MDRD: 70 ML/MIN/{1.73_M2}
GLUCOSE SERPL-MCNC: 86 MG/DL (ref 70–99)
POTASSIUM SERPL-SCNC: 3.6 MMOL/L (ref 3.4–5.3)
PROT SERPL-MCNC: 5.9 G/DL (ref 6.8–8.8)
SODIUM SERPL-SCNC: 134 MMOL/L (ref 133–144)

## 2020-02-04 PROCEDURE — 36415 COLL VENOUS BLD VENIPUNCTURE: CPT | Performed by: PHYSICIAN ASSISTANT

## 2020-02-04 PROCEDURE — G0378 HOSPITAL OBSERVATION PER HR: HCPCS

## 2020-02-04 PROCEDURE — 82140 ASSAY OF AMMONIA: CPT | Performed by: PHYSICIAN ASSISTANT

## 2020-02-04 PROCEDURE — 99232 SBSQ HOSP IP/OBS MODERATE 35: CPT | Performed by: PSYCHIATRY & NEUROLOGY

## 2020-02-04 PROCEDURE — 25000132 ZZH RX MED GY IP 250 OP 250 PS 637: Mod: GY | Performed by: INTERNAL MEDICINE

## 2020-02-04 PROCEDURE — 80053 COMPREHEN METABOLIC PANEL: CPT | Performed by: PHYSICIAN ASSISTANT

## 2020-02-04 PROCEDURE — 12000000 ZZH R&B MED SURG/OB

## 2020-02-04 PROCEDURE — 25000132 ZZH RX MED GY IP 250 OP 250 PS 637: Mod: GY | Performed by: PSYCHIATRY & NEUROLOGY

## 2020-02-04 PROCEDURE — 25000128 H RX IP 250 OP 636: Performed by: INTERNAL MEDICINE

## 2020-02-04 PROCEDURE — 99232 SBSQ HOSP IP/OBS MODERATE 35: CPT | Performed by: INTERNAL MEDICINE

## 2020-02-04 PROCEDURE — 25000132 ZZH RX MED GY IP 250 OP 250 PS 637: Mod: GY | Performed by: HOSPITALIST

## 2020-02-04 PROCEDURE — 25000132 ZZH RX MED GY IP 250 OP 250 PS 637: Mod: GY | Performed by: PHYSICIAN ASSISTANT

## 2020-02-04 RX ORDER — FOLIC ACID 1 MG/1
1 TABLET ORAL DAILY
Status: DISCONTINUED | OUTPATIENT
Start: 2020-02-04 | End: 2020-02-26 | Stop reason: HOSPADM

## 2020-02-04 RX ORDER — DICYCLOMINE HCL 20 MG
20 TABLET ORAL
Status: DISCONTINUED | OUTPATIENT
Start: 2020-02-04 | End: 2020-02-26 | Stop reason: HOSPADM

## 2020-02-04 RX ORDER — LANOLIN ALCOHOL/MO/W.PET/CERES
100 CREAM (GRAM) TOPICAL DAILY
Status: DISPENSED | OUTPATIENT
Start: 2020-02-04 | End: 2020-02-08

## 2020-02-04 RX ORDER — MULTIPLE VITAMINS W/ MINERALS TAB 9MG-400MCG
1 TAB ORAL DAILY
Status: DISCONTINUED | OUTPATIENT
Start: 2020-02-04 | End: 2020-02-26 | Stop reason: HOSPADM

## 2020-02-04 RX ORDER — DULOXETIN HYDROCHLORIDE 30 MG/1
30 CAPSULE, DELAYED RELEASE ORAL DAILY
Status: DISCONTINUED | OUTPATIENT
Start: 2020-02-04 | End: 2020-02-21 | Stop reason: DRUGHIGH

## 2020-02-04 RX ADMIN — DICYCLOMINE HYDROCHLORIDE 20 MG: 20 TABLET ORAL at 16:44

## 2020-02-04 RX ADMIN — PROPRANOLOL HYDROCHLORIDE 10 MG: 10 TABLET ORAL at 21:38

## 2020-02-04 RX ADMIN — ACETAMINOPHEN 650 MG: 325 TABLET, FILM COATED ORAL at 16:44

## 2020-02-04 RX ADMIN — ACETAMINOPHEN 650 MG: 325 TABLET, FILM COATED ORAL at 00:19

## 2020-02-04 RX ADMIN — LORAZEPAM 2 MG: 1 TABLET ORAL at 23:05

## 2020-02-04 RX ADMIN — FOLIC ACID 1 MG: 1 TABLET ORAL at 08:26

## 2020-02-04 RX ADMIN — THIAMINE HCL TAB 100 MG 100 MG: 100 TAB at 08:26

## 2020-02-04 RX ADMIN — DULOXETINE HYDROCHLORIDE 30 MG: 30 CAPSULE, DELAYED RELEASE ORAL at 13:06

## 2020-02-04 RX ADMIN — PROPRANOLOL HYDROCHLORIDE 10 MG: 10 TABLET ORAL at 08:26

## 2020-02-04 RX ADMIN — LORAZEPAM 1 MG: 1 TABLET ORAL at 17:19

## 2020-02-04 RX ADMIN — LORAZEPAM 1 MG: 1 TABLET ORAL at 06:00

## 2020-02-04 RX ADMIN — LORAZEPAM 1 MG: 1 TABLET ORAL at 11:56

## 2020-02-04 RX ADMIN — LORAZEPAM 1 MG: 1 TABLET ORAL at 00:19

## 2020-02-04 RX ADMIN — MIRTAZAPINE 15 MG: 15 TABLET, FILM COATED ORAL at 21:38

## 2020-02-04 RX ADMIN — NICOTINE 1 PATCH: 14 PATCH, EXTENDED RELEASE TRANSDERMAL at 08:29

## 2020-02-04 RX ADMIN — ONDANSETRON 4 MG: 4 TABLET, ORALLY DISINTEGRATING ORAL at 06:04

## 2020-02-04 RX ADMIN — FUROSEMIDE 40 MG: 40 TABLET ORAL at 08:26

## 2020-02-04 RX ADMIN — MULTIPLE VITAMINS W/ MINERALS TAB 1 TABLET: TAB at 08:26

## 2020-02-04 RX ADMIN — PANTOPRAZOLE SODIUM 40 MG: 40 TABLET, DELAYED RELEASE ORAL at 08:26

## 2020-02-04 RX ADMIN — DICYCLOMINE HYDROCHLORIDE 20 MG: 20 TABLET ORAL at 21:38

## 2020-02-04 RX ADMIN — ACETAMINOPHEN 650 MG: 325 TABLET, FILM COATED ORAL at 11:56

## 2020-02-04 RX ADMIN — TRAZODONE HYDROCHLORIDE 200 MG: 50 TABLET ORAL at 21:38

## 2020-02-04 RX ADMIN — LORAZEPAM 1 MG: 1 TABLET ORAL at 08:26

## 2020-02-04 RX ADMIN — SPIRONOLACTONE 100 MG: 25 TABLET ORAL at 08:26

## 2020-02-04 ASSESSMENT — ACTIVITIES OF DAILY LIVING (ADL): ADLS_ACUITY_SCORE: 18

## 2020-02-04 NOTE — CONSULTS
[facility] Psychiatric Consult Progress Note      Interval History:   Pt seen, care reviewed with treatment team.  The preceding clinical notes were reviewed and appreciated.  The patient has significant alcohol use disorder with alcohol withdrawal and alcoholic liver disease including ascites and esophageal varices.  He underwent ultrasound-guided paracentesis and has almost 7000 mL of serous fluid removed.  He has a history of recidivism and medication noncompliance.  He tells me that he has been rejected for CD treatment by [facility] 3 separate times what he is willing to quit drinking and get help at this time.  He states he knows that his physical health is not getting any better and that he had not been able to comply with previous recommendations on account of limited social supports.  He states he was also taking Cymbalta at home and would like to resume the medication.  However, he was advised that due to his extensive liver disease, Cymbalta will be restarted at a lower dose.  He denies experiencing any self-harm thoughts plans or intent.  He states he was able to sleep last night in spite of the loud CPAP machine.  With respect to his ability to ambulate, patient tells me he is still very wobbly and unable to do so without support at this time..   Review of systems:   The Review of Systems is negative other than noted in the HPI     Medications:       dicyclomine  20 mg Oral 4x Daily AC & HS     fluticasone-vilanterol  1 puff Inhalation Daily     folic acid  1 mg Oral Daily     furosemide  40 mg Oral Daily     mirtazapine  15 mg Oral At Bedtime     multivitamin w/minerals  1 tablet Oral Daily     nicotine  1 patch Transdermal Daily     nicotine   Transdermal Q8H     pantoprazole  40 mg Oral QAM AC     propranolol  10 mg Oral BID     spironolactone  100 mg Oral Daily     traZODone  200 mg Oral At Bedtime     vitamin B1  100 mg Oral Daily     acetaminophen, acetaminophen, albuterol,  glucose **OR** dextrose **OR** glucagon, hypromellose-dextran, LORazepam **OR** LORazepam, melatonin, miconazole, naloxone, ondansetron **OR** ondansetron, oxyCODONE, QUEtiapine      Mental Status Examination:     Appearance:  awake, alert, dressed in hospital scrubs, appeared as age stated and Mildly icteric  Eye Contact:  good  Speech:  clear, coherent and normal prosody  Psychomotor Behavior:  no evidence of tardive dyskinesia, dystonia, or tics, fidgeting and tremor observed   Mood:  sad  and depressed  Affect:  mood congruent, intensity is flat and restricted range  Thought Process:  logical, linear and goal oriented no loose associations  Thought Content:  no evidence of suicidal ideation or homicidal ideation and no evidence of psychotic thought  Oriented to:  time, person, and place  Attention Span and Concentration:  fair  Recent and Remote Memory:  fair  Fund of Knowledge: low-normal  Muscle Strength and Tone: Weak  Gait and Station: Normal  Insight:  fair  Judgment:  limited        Labs/vitals:     Recent Results (from the past 24 hour(s))   Ammonia    Collection Time: 02/04/20  8:00 AM   Result Value Ref Range    Ammonia 37 10 - 50 umol/L   Comprehensive metabolic panel    Collection Time: 02/04/20  8:00 AM   Result Value Ref Range    Sodium 134 133 - 144 mmol/L    Potassium 3.6 3.4 - 5.3 mmol/L    Chloride 104 94 - 109 mmol/L    Carbon Dioxide 23 20 - 32 mmol/L    Anion Gap 7 3 - 14 mmol/L    Glucose 86 70 - 99 mg/dL    Urea Nitrogen 23 7 - 30 mg/dL    Creatinine 1.09 0.66 - 1.25 mg/dL    GFR Estimate 70 >60 mL/min/[1.73_m2]    GFR Estimate If Black 82 >60 mL/min/[1.73_m2]    Calcium 7.6 (L) 8.5 - 10.1 mg/dL    Bilirubin Total 2.5 (H) 0.2 - 1.3 mg/dL    Albumin 2.3 (L) 3.4 - 5.0 g/dL    Protein Total 5.9 (L) 6.8 - 8.8 g/dL    Alkaline Phosphatase 234 (H) 40 - 150 U/L    ALT 22 0 - 70 U/L    AST 53 (H) 0 - 45 U/L     B/P: 146/78, T: 98.9, P: 74, R: 16    Impression:   Jim Richard is a 64 year old  year old male with established history of alcohol use disorder, liver cirrhosis with ascites and esophageal varices along with major depressive disorder who presented to [facility] ED via EMS on account of alcohol intoxication, abdominal distention and suicidal ideation.  He is convalescing on the medical service where he is reporting absence of self-harm thoughts or plans and a desire to pursue recommended CD treatment.      DIagnoses:   1.  Alcohol use disorder.    2.  Major depressive disorder, recurrent, moderate.  3.  Alcoholic liver disease with ascites  4.  Esophageal varices.  5.  JANIS.  6.  COPD.  7.  Tobacco use disorder.  8.  Anemia.           Plan:   1. Written information given on medications. Side effects, risks, benefits reviewed.  2. Resume Cymbalta at 30 mg daily.  3. Facilitate CD treatment as recommended.        Attestation:  Patient has been seen and evaluated by me,  Kareem Jacobs MD

## 2020-02-04 NOTE — PROGRESS NOTES
OBSERVATION GOALS    -diagnostic tests and consults completed and resulted: yes    -vital signs normal or at patient baseline: Met    -returns to baseline functional status: Not yet met    -safe disposition plan has been identified: Not yet met; SW and Psych following    Nurse to notify provider when observation goals have been met and patient is ready for discharge.    [facility] on 2/4/2020 at 2:01 AM

## 2020-02-04 NOTE — CONSULTS
Patient seen for follow-up psychiatric consultation. Please see my note for details. Thanks.    Kareem Jacobs MD

## 2020-02-04 NOTE — PROGRESS NOTES
MD Notification    Notified Person: MD    Notified Person Name: House officer pager.     Notification Date/Time: 02/04/2020 1612    Notification Interaction: Web page    Purpose of Notification: Witnessed/assisted fall.     Orders Received:    Comments:    no

## 2020-02-04 NOTE — CODE/RAPID RESPONSE
[facility]    Fall Note  2/4/2020   Time Called: 1609  Date of Admission:  2/3/2020  Code Status: DNR / DNI    I was called to evaluate Mr. Richard for an assisted, witnessed fall.  The patient was ambulating to the bathroom with staff, when his lower extremities became weak -he was lowered to the ground onto his knees.  He did not strike his head, or injure himself per his (and staff) report.  He does endorse some ongoing dizziness and lightheadedness with all ambulation lately.  He notes some gait instability, he has had a prolonged hospitalization due to alcohol withdrawal.    Vitals were obtained upon assisting the patient back to bed, which were stable and unchanged.  Patient denies any pain with his knees, there is no tenderness to palpation, no instability noted on exam.  No new lacerations or skin tears on his knees.  His strength is equal bilaterally in his lower extremities.  --Orthostatic vitals  --Fall risk  --PT/OT  --Telemetry x24 hours  --Neuro is every 4 hours x24 hours    Temp: 98.6  F (37  C) Temp src: Oral BP: 117/66 Pulse: 74 Heart Rate: 70 Resp: 18 SpO2: 96 % O2 Device: None (Room air) Oxygen Delivery: 2 LPM     Not all injuries from a fall are obvious on initial exam, please to not hesitate to call for reassessment by House provider should the patient's clinical status change, report new pain or symptoms.         MADISON Sloan [facility]  Hospitalist Service  House Officer  Pager: [phone] (7a - 6p)

## 2020-02-04 NOTE — PROGRESS NOTES
Brief Palliative Progress Note.    Consult received, chart reviewed and discussed with Dr Aceves. Palliative was consulted for GOC discussion in setting of advanced liver disease. Notes indicate pt is willing to seek inpatient treatment for his alcohol use disorder. Psych following, pt expressed suicidal ideation on admission. At this time, pt not appropriate for Palliative Care involvement, we will cancel consult (Dr Aceves in agreement). Please do not hesitate to contact our team if further needs arise. Thank you.    Isis WALLACE, [facility]  Palliative Medicine  [phone] (team pager)

## 2020-02-04 NOTE — PLAN OF CARE
DATE & TIME: 2/4/2020 1164-8190                         Cognitive Concerns/ Orientation : A&O x4, forgetful, impulsive at times  BEHAVIOR & AGGRESSION TOOL COLOR: green  CIWA SCORE: 12, 6 PRN Ativan given x1   ABNL VS/O2: VSS on 2L NC. Refused CPAP overnight. Elevated BP-scheduled meds  MOBILITY: A x1-2 GB/W; increasingly weak/unsteady throughout shift  PAIN MANAGMENT: Complaint of headache; PRN Tylenol given x1  DIET: 2G Na, tolerating.   BOWEL/BLADDER: Mostly continent. Urinary urgency, using urinal. BM urgency, using BSC  ABNL LAB/BG:    DRAIN/DEVICES: PIV SL  TELEMETRY RHYTHM: NA  SKIN: red scrotum, miconazole available; LLQ abd puncture site, covered with band-aid CDI  TESTS/PROCEDURES: 6850 mL removed in paracentesis yesterday  D/C DAY/GOALS/PLACE: pending withdrawal progress; Psych and SW following for possible placement.   OTHER IMPORTANT INFO: Per discussion with pt's daughter, he has been becoming increasingly forgetful lately. Pt's brother would like to discuss post-discharge options with SW    Update: 0553 CIWA Score 12; PRN Ativan 1mg given

## 2020-02-04 NOTE — PROVIDER NOTIFICATION
MD Notification    Notified Person: MD    Notified Person Name: Dr. Huber    Notification Date/Time: 2/3/2020 2100    Notification Interaction: page    Purpose of Notification: Can pt have nicotine patch?     Orders Received:    Comments:

## 2020-02-04 NOTE — PROGRESS NOTES
OBSERVATION GOALS     -diagnostic tests and consults completed and resulted: yes    -vital signs normal or at patient baseline: Met    -returns to baseline functional status: Not yet met    -safe disposition plan has been identified: Not yet met; SW and Psych following    Nurse to notify provider when observation goals have been met and patient is ready for discharge.

## 2020-02-04 NOTE — PROGRESS NOTES
[facility]    Hospitalist Progress Note      Assessment & Plan   Jim Richard is a 64 year old male who was admitted on 2/3/2020. His history is significant for cirrhosis (w/ ascites, esophageal varices), EtOH use disorder, depression/anxiety, COPD, sleep apnea, anemia, admitted for EtOH intoxication and SI.    He was recently hospitalized at [facility] for weakness in EtOH withdrawal, and was seen by GI, CD, and Psych. It appears the initial plan was for him to go to TCU w/ close GI f/u to adjust his diuretics, but he refused TCU and went home. Since going home he has been drinking, medication compliance sporadic, with worsening abdominal ascites. He came in for SI in the setting of drinking.    Cirrhosis with ascites  Esophageal varices  Diuretics held since last admission. Had paracentesis on 02/03/20 w/ 6.7 L serous fluid removed.  - GI recs:   - hold lactulose until diarrhea resolves.   - consider colonoscopy if diarrhea persists   - PTA pantoprazole   - PTA propranolol   - spironolactone 100 daily   - furosemide 40 daily   - dicyclomine for GI cramps    Depression with suicidal ideation  Anxiety  Told both Psych and me that he had SI before coming into hospital due to drinking, although no longer w/ SI.  - Psych recs:   - f/u w/ SW regarding hospital-based detox options, either [place] or [facility]   - consider commitment if he demands to leave   - Seroquel 50 at bedtime PRN   - mirtazapine 15 at bedtime   - trazodone 200 at bedtime (listed as 100 at bedtime in pt's chart)   - duloxetine 30 daily  - CD consult: plan to refer pt to [facility]     Alcohol intoxication  Alcohol use disorder  Drinking 750 ml vodka daily. Etoh level on admission at 0.39. h/o DT's, no h/o withdrawal seizures. S/p NS w/ thiamine & folic acid.  - thiamine 100 daily  - folic acid 1 mg daily  - d'c IVF given pt's ascites  - CIWA  - prn lorazepam     JANIS on CPAP  CPAP per home settings     COPD  Tobacco abuse  PTA prn  albuterol, Breo Ellipta  - continue inhalers    Anemia  Baseline 9-10 recent. Presumed 2/2 Etoh, liver disease, varices      DVT Prophylaxis: Pneumatic Compression Devices  Code Status: DNR/DNI  Expected discharge: >2-3 days pending monitoring of EtOH withdrawal and dispo planning    Efrain Aceves MD  Text Page (7am - 6pm, M-F)    Interval History   Abd distention and discomfort improved after paracentesis  Continues to be tremulous, anxious, mildly sweaty  No fevers, abd pain, N/V.    -Data reviewed today: I reviewed all new labs and imaging results over the last 24 hours.      Physical Exam   Temp: 98.5  F (36.9  C) Temp src: Oral BP: 128/62 Pulse: 66 Heart Rate: 67 Resp: 16 SpO2: 91 % O2 Device: None (Room air) Oxygen Delivery: 2 LPM  Vitals:    02/03/20 0800   Weight: 89.6 kg (197 lb 8.5 oz)     Vital Signs with Ranges  Temp:  [98.1  F (36.7  C)-99  F (37.2  C)] 98.5  F (36.9  C)  Pulse:  [66-78] 66  Heart Rate:  [63-72] 67  Resp:  [16-18] 16  BP: (128-142)/(62-78) 128/62  SpO2:  [88 %-95 %] 91 %  I/O last 3 completed shifts:  In: 2174 [P.O.:1160; I.V.:964]  Out: 100 [Urine:100]    General: well-appearing, NAD  HEENT: NC/AT, moist mucus membranes  Heart: RRR no m/r/g  Lungs: CTAB no crackles or wheezes  Abdomen: NABS, non-tender, +distended; no rebound or guarding; no HSM or masses.  Extremities: warm, well-perfused. 2+ bilateral ankle pitting edema.  Neuro: CN II-XII grossly intact, moving extremities spontaneously. No asterixis  Psych: normal mood and affect    Medications       fluticasone-vilanterol  1 puff Inhalation Daily     folic acid  1 mg Oral Daily     furosemide  40 mg Oral Daily     mirtazapine  15 mg Oral At Bedtime     multivitamin w/minerals  1 tablet Oral Daily     nicotine  1 patch Transdermal Daily     nicotine   Transdermal Q8H     pantoprazole  40 mg Oral QAM AC     propranolol  10 mg Oral BID     spironolactone  100 mg Oral Daily     traZODone  200 mg Oral At Bedtime     vitamin B1  100 mg  Oral Daily       Data   Recent Labs   Lab 02/03/20  0218   WBC 7.9   HGB 9.6*   MCV 97      INR 1.18*      POTASSIUM 3.6   CHLORIDE 108   CO2 21   BUN 24   CR 1.01   ANIONGAP 10   KEV 7.9*   GLC 76   ALBUMIN 2.7*   PROTTOTAL 7.0   BILITOTAL 1.6*   ALKPHOS 286*   ALT 26   AST 61*   LIPASE 369       No results found for this or any previous visit (from the past 24 hour(s)).

## 2020-02-04 NOTE — PROGRESS NOTES
Pt sustained a witnessed fall  In his room at 1600. Pt asked to get up to go on BSC-was helped with assist of 2 and gait belt. Pt was shaky, would not follow directions. He bent his knees and started falling down so writer and NA help to assist him on floor. Pt didn't hit his head, no s// other injuries, VSS on RA. Pt c/o headache-was c/o HA prior to fall also. House Provider notified, orders received.

## 2020-02-04 NOTE — PLAN OF CARE
DATE & TIME: 2/3/2020 5068-7457    Cognitive Concerns/ Orientation : A&O4, forgetful   BEHAVIOR & AGGRESSION TOOL COLOR: green, can be impulsive  CIWA SCORE: 5, 10, 0--ativan given x1   ABNL VS/O2: VSS on RA, except BP elevated--on scheduled propanolol  MOBILITY: Ax1 GB/W, feeling increasingly weak throughout shift  PAIN MANAGMENT: denies pain  DIET: 2G Na, tolerating. Pt reports feeling full easily and not able to eat much  BOWEL/BLADDER: continent, up to BR very often. Urgency with BMs, BSC in place. 5 BM this shift, becoming more formed; incontinent at times due to urgency  ABNL LAB/BG:    DRAIN/DEVICES: PIV SL  TELEMETRY RHYTHM: NA  SKIN: red scrotum, miconazole available; LLQ abd puncture site, covered with band-aid CDI  TESTS/PROCEDURES: 6850 mL removed in paracentesis this AM.  D/C DAY/GOALS/PLACE: pending withdrawal progress  OTHER IMPORTANT INFO: Per discussion with pt;s daughter, he has been becoming increasingly forgetful lately. Pt's brother would like to discuss post-discharge options with DANIELE

## 2020-02-04 NOTE — CONSULTS
2/4/2020 chem dep consult acknowledged.  Patient has Medicare insurance so is limited to programming, we will refer him to [facility].  I left a message for their intake department regarding whether or not they need an evaluation for patient as it is my understanding they can take Medicare referrals into inpatient with an MD-to-MD referral.  We know patient is over income so we cannot submit Rule 25 for Medicaid.  Discussed with unit , I will follow up tomorrow regarding if an evaluation is needed for [facility], if so, I will meet with patient to complete an update as he was last seen 10/2019.  If not, I will contact unit  and care coodinator to initiate referral.

## 2020-02-04 NOTE — PLAN OF CARE
DATE & TIME: 2/5/2020 1481-5762                 Cognitive Concerns/ Orientation : A&O x4, forgetful, impulsive at times.   BEHAVIOR & AGGRESSION TOOL COLOR: Green  CIWA SCORE: 0800 (10) - PO Ativan, 0900 (6), 1100 (9) - PO Ativan, 1300 (6)  ABNL VS/O2: VSS on RA. Refused CPAP overnight. During periods of rest, will need oxygen for JANIS.   MOBILITY: A x2 GB/W; increasingly weak/unsteady throughout shift. Impulsive and uncoordinated gait. Pt requesting to walk around unit. Unsafe, educate pt on need for more strength before walks.   PAIN MANAGMENT: Complaint of headache; PRN Tylenol given x1.   DIET: 2G Na, tolerating.   BOWEL/BLADDER: Mostly contient. Pt reports urgency and frequency. Urinary incontinence in depends.   ABNL LAB/BG:  NA  DRAIN/DEVICES: PIV SL  TELEMETRY RHYTHM: NA  SKIN: red scrotum, miconazole available; pt refused powder. LLQ abd puncture site, covered with band-aid, CDI.   TESTS/PROCEDURES: 6850 mL removed in paracentesis yesterday  D/C DAY/GOALS/PLACE: pending withdrawal progress; Psych and SW following for possible placement.   OTHER IMPORTANT INFO: Per discussion with pt's daughter, he has been becoming increasingly forgetful lately. Pt's brother would like to discuss post-discharge options with SW

## 2020-02-04 NOTE — PROGRESS NOTES
[facility]  Gastroenterology Progress Note     Jim Richard MRN# [id number]   YOB: 1955 Age: 64 year old          Assessment and Plan:    Update: Patient has started to have formed stools. Shaking has improved but still present with alcohol withdrawal. Abdomen remains distended but tympanic and soft. No evidence of large amount of ascites. Tolerating diet. NO CMP today. Ammonia 37.     Active Problems:    Alcohol withdrawal (H)  Alcohol liver cirrhosis with ascites and esophageal varices  Jim Richard is a pleasant 64 year old male with history of alcohol abuse, alcoholic liver cirrhosis with ascites and esophageal varices with diarrhea for 3 days. GI consulted for continuation of care and history of cirrhosis. Patient has had exacerbation of symptoms associated with excessive alcohol intake. Underwent ultrasound guided paracentesis and had 6750 mL of serous fluid removed. Hemoglobin stable and at baseline. Has been noncompliant with medication. Has been started on propranolol with history of esophageal varices. Has had lactulose held with diarrhea and stools incontinence. Has had diuretics restarted- will check creat to ensure tolerating. Does complain of cramping and bloating with no sharp or localized abdominal pain. Likely IBS.     - Recommend to consider inpatient alcohol treatment given 4 admissions in last year for alcohol related problems  - Continue with spironolactone 100 mg and furosemide 40 mg daily  - Ordered CMP  - Avoid lactulose until diarrhea resolves- may restart tomorrow pending stools today  - Continue with pantoprazole and propranolol  - If diarrhea continues can plan colonoscopy.  - Start on dicyclomine for abdominal cramping            Alcoholic cirrhosis of liver with ascites (H)  Alcoholic intoxication with complication (H)  Suicidal ideation      Interval History:   no new complaints, doing well, denies chest pain, denies shortness of breath, alert,  oriented to person, place and time and has had a bowel movement in the last 24 hours              Review of Systems:   C: NEGATIVE for fever, chills, change in weight  E/M: NEGATIVE for ear, mouth and throat problems  R: NEGATIVE for significant cough or SOB  CV: NEGATIVE for chest pain, palpitations or peripheral edema             Medications:   I have reviewed this patient's current medications    fluticasone-vilanterol  1 puff Inhalation Daily     folic acid  1 mg Oral Daily     furosemide  40 mg Oral Daily     mirtazapine  15 mg Oral At Bedtime     multivitamin w/minerals  1 tablet Oral Daily     nicotine  1 patch Transdermal Daily     nicotine   Transdermal Q8H     pantoprazole  40 mg Oral QAM AC     propranolol  10 mg Oral BID     spironolactone  100 mg Oral Daily     traZODone  200 mg Oral At Bedtime     vitamin B1  100 mg Oral Daily                  Physical Exam:   Vitals were reviewed  Vital Signs with Ranges  Temp:  [98.1  F (36.7  C)-99  F (37.2  C)] 98.5  F (36.9  C)  Pulse:  [66-78] 66  Heart Rate:  [63-72] 67  Resp:  [16-18] 16  BP: (128-142)/(62-78) 128/62  SpO2:  [88 %-95 %] 91 %  I/O last 3 completed shifts:  In: 2174 [P.O.:1160; I.V.:964]  Out: 100 [Urine:100]  Constitutional: alert, mild distress, cooperative and pale   Cardiovascular: negative, PMI normal. No lifts, heaves, or thrills. RRR. No murmurs, clicks gallops or rub  Respiratory: negative, Percussion normal. Good diaphragmatic excursion. Lungs clear  Head: Normocephalic. No masses, lesions, tenderness or abnormalities  Neck: Neck supple. No adenopathy. Thyroid symmetric, normal size,, Carotids without bruits.  Abdomen: Abdomen soft, mild tenderness . BS normal. No masses, organomegaly, positive findings: distended- tympanic and mildly firm, tenderness mild generalized           Data:   I reviewed the patient's new clinical lab test results.   Recent Labs   Lab Test 02/03/20  0218 01/16/20  0748 01/14/20  0838  01/07/20  0750  12/31/19  1146   WBC 7.9 3.9* 3.7*   < > 4.8 5.5   HGB 9.6* 9.4* 9.9*   < > 9.4* 9.9*   MCV 97 96 98   < > 95 96    175 143*   < > 85* 205   INR 1.18*  --   --   --  1.26* 1.24*    < > = values in this interval not displayed.     Recent Labs   Lab Test 02/03/20 0218 01/16/20  0748 01/14/20  0838   POTASSIUM 3.6 4.0 3.8   CHLORIDE 108 107 105   CO2 21 24 24   BUN 24 30 34*   ANIONGAP 10 4 6     Recent Labs   Lab Test 02/03/20 0218 01/14/20  0838 01/11/20  1730 01/10/20  0807  11/13/19  1858 11/13/19  1849  02/13/19  2340  09/25/14  0510   ALBUMIN 2.7* 2.8*  --  2.3*   < > 3.0*  --    < > 3.1*   < >  --    BILITOTAL 1.6* 1.2  --  1.4*   < > 1.0  --    < > 0.4   < >  --    ALT 26 57  --  44   < > 24  --    < > 41   < >  --    AST 61* 78*  --  111*   < > 58*  --    < > 89*   < >  --    PROTEIN  --   --  Negative  --   --   --  30*  --   --   --  Negative   LIPASE 369  --   --   --   --  499*  --   --  536*   < >  --     < > = values in this interval not displayed.       I reviewed the patient's new imaging results.    All laboratory data reviewed  All imaging studies reviewed by me.    Gloria De Leon PA-C,  2/4/2020  [facility]  Office : [phone]  Cell: [phone] (Dr. Shaw)  Cell: [phone] (Gloria De Leon PA-C)

## 2020-02-05 ENCOUNTER — APPOINTMENT (OUTPATIENT)
Dept: ULTRASOUND IMAGING | Facility: CLINIC | Age: 65
DRG: 433 | End: 2020-02-05
Attending: INTERNAL MEDICINE
Payer: MEDICARE

## 2020-02-05 LAB
ANION GAP SERPL CALCULATED.3IONS-SCNC: 5 MMOL/L (ref 3–14)
BASOPHILS # BLD AUTO: 0 10E9/L (ref 0–0.2)
BASOPHILS NFR BLD AUTO: 0.2 %
BUN SERPL-MCNC: 22 MG/DL (ref 7–30)
CALCIUM SERPL-MCNC: 8.4 MG/DL (ref 8.5–10.1)
CHLORIDE SERPL-SCNC: 104 MMOL/L (ref 94–109)
CO2 SERPL-SCNC: 27 MMOL/L (ref 20–32)
CREAT SERPL-MCNC: 1.19 MG/DL (ref 0.66–1.25)
DIFFERENTIAL METHOD BLD: ABNORMAL
EOSINOPHIL # BLD AUTO: 0.1 10E9/L (ref 0–0.7)
EOSINOPHIL NFR BLD AUTO: 2 %
ERYTHROCYTE [DISTWIDTH] IN BLOOD BY AUTOMATED COUNT: 14.4 % (ref 10–15)
GFR SERPL CREATININE-BSD FRML MDRD: 63 ML/MIN/{1.73_M2}
GLUCOSE BLDC GLUCOMTR-MCNC: 103 MG/DL (ref 70–99)
GLUCOSE SERPL-MCNC: 99 MG/DL (ref 70–99)
HCT VFR BLD AUTO: 27 % (ref 40–53)
HGB BLD-MCNC: 9 G/DL (ref 13.3–17.7)
IMM GRANULOCYTES # BLD: 0 10E9/L (ref 0–0.4)
IMM GRANULOCYTES NFR BLD: 0.2 %
LACTATE BLD-SCNC: 2.4 MMOL/L (ref 0.7–2)
LYMPHOCYTES # BLD AUTO: 0.6 10E9/L (ref 0.8–5.3)
LYMPHOCYTES NFR BLD AUTO: 13.6 %
MAGNESIUM SERPL-MCNC: 1.2 MG/DL (ref 1.6–2.3)
MCH RBC QN AUTO: 32 PG (ref 26.5–33)
MCHC RBC AUTO-ENTMCNC: 33.3 G/DL (ref 31.5–36.5)
MCV RBC AUTO: 96 FL (ref 78–100)
MONOCYTES # BLD AUTO: 0.5 10E9/L (ref 0–1.3)
MONOCYTES NFR BLD AUTO: 13.1 %
NEUTROPHILS # BLD AUTO: 2.9 10E9/L (ref 1.6–8.3)
NEUTROPHILS NFR BLD AUTO: 70.9 %
NRBC # BLD AUTO: 0 10*3/UL
NRBC BLD AUTO-RTO: 0 /100
PHOSPHATE SERPL-MCNC: 2.5 MG/DL (ref 2.5–4.5)
PLATELET # BLD AUTO: 58 10E9/L (ref 150–450)
PLATELET # BLD EST: ABNORMAL 10*3/UL
POTASSIUM SERPL-SCNC: 3.6 MMOL/L (ref 3.4–5.3)
RBC # BLD AUTO: 2.81 10E12/L (ref 4.4–5.9)
RBC MORPH BLD: ABNORMAL
SODIUM SERPL-SCNC: 136 MMOL/L (ref 133–144)
WBC # BLD AUTO: 4.1 10E9/L (ref 4–11)

## 2020-02-05 PROCEDURE — 25000132 ZZH RX MED GY IP 250 OP 250 PS 637: Mod: GY | Performed by: HOSPITALIST

## 2020-02-05 PROCEDURE — 99356 ZZC PROLONGED SERV,INPATIENT,1ST HR: CPT | Performed by: NURSE PRACTITIONER

## 2020-02-05 PROCEDURE — 25000132 ZZH RX MED GY IP 250 OP 250 PS 637: Mod: GY | Performed by: INTERNAL MEDICINE

## 2020-02-05 PROCEDURE — 83735 ASSAY OF MAGNESIUM: CPT | Performed by: NURSE PRACTITIONER

## 2020-02-05 PROCEDURE — 93971 EXTREMITY STUDY: CPT | Mod: LT

## 2020-02-05 PROCEDURE — 25000132 ZZH RX MED GY IP 250 OP 250 PS 637: Mod: GY | Performed by: PHYSICIAN ASSISTANT

## 2020-02-05 PROCEDURE — 80048 BASIC METABOLIC PNL TOTAL CA: CPT | Performed by: NURSE PRACTITIONER

## 2020-02-05 PROCEDURE — 25000132 ZZH RX MED GY IP 250 OP 250 PS 637: Mod: GY | Performed by: PSYCHIATRY & NEUROLOGY

## 2020-02-05 PROCEDURE — 99233 SBSQ HOSP IP/OBS HIGH 50: CPT | Performed by: INTERNAL MEDICINE

## 2020-02-05 PROCEDURE — 25000128 H RX IP 250 OP 636: Performed by: NURSE PRACTITIONER

## 2020-02-05 PROCEDURE — 84100 ASSAY OF PHOSPHORUS: CPT | Performed by: NURSE PRACTITIONER

## 2020-02-05 PROCEDURE — 25000128 H RX IP 250 OP 636: Performed by: INTERNAL MEDICINE

## 2020-02-05 PROCEDURE — 36415 COLL VENOUS BLD VENIPUNCTURE: CPT | Performed by: NURSE PRACTITIONER

## 2020-02-05 PROCEDURE — 85025 COMPLETE CBC W/AUTO DIFF WBC: CPT | Performed by: NURSE PRACTITIONER

## 2020-02-05 PROCEDURE — 00000146 ZZHCL STATISTIC GLUCOSE BY METER IP

## 2020-02-05 PROCEDURE — 83605 ASSAY OF LACTIC ACID: CPT | Performed by: NURSE PRACTITIONER

## 2020-02-05 PROCEDURE — 12000000 ZZH R&B MED SURG/OB

## 2020-02-05 RX ORDER — DIAZEPAM 10 MG/2ML
5-10 INJECTION, SOLUTION INTRAMUSCULAR; INTRAVENOUS EVERY 30 MIN PRN
Status: DISCONTINUED | OUTPATIENT
Start: 2020-02-05 | End: 2020-02-09

## 2020-02-05 RX ORDER — DIAZEPAM 5 MG
10 TABLET ORAL EVERY 30 MIN PRN
Status: DISCONTINUED | OUTPATIENT
Start: 2020-02-05 | End: 2020-02-09

## 2020-02-05 RX ORDER — MAGNESIUM SULFATE HEPTAHYDRATE 40 MG/ML
4 INJECTION, SOLUTION INTRAVENOUS EVERY 4 HOURS PRN
Status: DISCONTINUED | OUTPATIENT
Start: 2020-02-05 | End: 2020-02-25

## 2020-02-05 RX ORDER — LORAZEPAM 2 MG/ML
1 INJECTION INTRAMUSCULAR EVERY 8 HOURS
Status: COMPLETED | OUTPATIENT
Start: 2020-02-05 | End: 2020-02-07

## 2020-02-05 RX ORDER — MAGNESIUM SULFATE HEPTAHYDRATE 40 MG/ML
2 INJECTION, SOLUTION INTRAVENOUS DAILY PRN
Status: DISCONTINUED | OUTPATIENT
Start: 2020-02-05 | End: 2020-02-25

## 2020-02-05 RX ORDER — LORAZEPAM 2 MG/ML
2 INJECTION INTRAMUSCULAR ONCE
Status: COMPLETED | OUTPATIENT
Start: 2020-02-05 | End: 2020-02-05

## 2020-02-05 RX ADMIN — LORAZEPAM 1 MG: 2 INJECTION INTRAMUSCULAR; INTRAVENOUS at 11:16

## 2020-02-05 RX ADMIN — LORAZEPAM 1 MG: 2 INJECTION INTRAMUSCULAR; INTRAVENOUS at 19:16

## 2020-02-05 RX ADMIN — MICONAZOLE NITRATE: 20 POWDER TOPICAL at 16:12

## 2020-02-05 RX ADMIN — MAGNESIUM SULFATE HEPTAHYDRATE 4 G: 40 INJECTION, SOLUTION INTRAVENOUS at 14:26

## 2020-02-05 RX ADMIN — LORAZEPAM 1 MG: 2 INJECTION INTRAMUSCULAR; INTRAVENOUS at 13:37

## 2020-02-05 RX ADMIN — MICONAZOLE NITRATE: 20 POWDER TOPICAL at 08:56

## 2020-02-05 RX ADMIN — NICOTINE 1 PATCH: 14 PATCH, EXTENDED RELEASE TRANSDERMAL at 08:40

## 2020-02-05 RX ADMIN — DICYCLOMINE HYDROCHLORIDE 20 MG: 20 TABLET ORAL at 06:46

## 2020-02-05 RX ADMIN — ACETAMINOPHEN 650 MG: 325 TABLET, FILM COATED ORAL at 17:56

## 2020-02-05 RX ADMIN — FOLIC ACID 1 MG: 1 TABLET ORAL at 08:39

## 2020-02-05 RX ADMIN — SPIRONOLACTONE 100 MG: 25 TABLET ORAL at 08:38

## 2020-02-05 RX ADMIN — MULTIPLE VITAMINS W/ MINERALS TAB 1 TABLET: TAB at 08:39

## 2020-02-05 RX ADMIN — LORAZEPAM 1 MG: 1 TABLET ORAL at 08:39

## 2020-02-05 RX ADMIN — DULOXETINE HYDROCHLORIDE 30 MG: 30 CAPSULE, DELAYED RELEASE ORAL at 08:39

## 2020-02-05 RX ADMIN — PROPRANOLOL HYDROCHLORIDE 10 MG: 10 TABLET ORAL at 21:36

## 2020-02-05 RX ADMIN — PANTOPRAZOLE SODIUM 40 MG: 40 TABLET, DELAYED RELEASE ORAL at 06:46

## 2020-02-05 RX ADMIN — FUROSEMIDE 40 MG: 40 TABLET ORAL at 08:39

## 2020-02-05 RX ADMIN — LORAZEPAM 1 MG: 1 TABLET ORAL at 00:01

## 2020-02-05 RX ADMIN — THIAMINE HCL TAB 100 MG 100 MG: 100 TAB at 08:39

## 2020-02-05 RX ADMIN — MIRTAZAPINE 15 MG: 15 TABLET, FILM COATED ORAL at 21:36

## 2020-02-05 RX ADMIN — QUETIAPINE FUMARATE 50 MG: 50 TABLET ORAL at 01:20

## 2020-02-05 RX ADMIN — LORAZEPAM 1 MG: 1 TABLET ORAL at 09:48

## 2020-02-05 RX ADMIN — LORAZEPAM 2 MG: 2 INJECTION INTRAMUSCULAR; INTRAVENOUS at 11:46

## 2020-02-05 RX ADMIN — PROPRANOLOL HYDROCHLORIDE 10 MG: 10 TABLET ORAL at 08:39

## 2020-02-05 RX ADMIN — DICYCLOMINE HYDROCHLORIDE 20 MG: 20 TABLET ORAL at 21:36

## 2020-02-05 RX ADMIN — LORAZEPAM 2 MG: 1 TABLET ORAL at 01:20

## 2020-02-05 RX ADMIN — MICONAZOLE NITRATE: 20 POWDER TOPICAL at 12:02

## 2020-02-05 RX ADMIN — DIAZEPAM 5 MG: 5 INJECTION, SOLUTION INTRAMUSCULAR; INTRAVENOUS at 14:49

## 2020-02-05 RX ADMIN — TRAZODONE HYDROCHLORIDE 200 MG: 50 TABLET ORAL at 21:36

## 2020-02-05 RX ADMIN — DICYCLOMINE HYDROCHLORIDE 20 MG: 20 TABLET ORAL at 17:57

## 2020-02-05 RX ADMIN — LORAZEPAM 1 MG: 2 INJECTION INTRAMUSCULAR; INTRAVENOUS at 12:13

## 2020-02-05 ASSESSMENT — ACTIVITIES OF DAILY LIVING (ADL)
ADLS_ACUITY_SCORE: 20
ADLS_ACUITY_SCORE: 13
ADLS_ACUITY_SCORE: 22

## 2020-02-05 NOTE — PROGRESS NOTES
[facility]  Gastroenterology Progress Note     Jim Richard MRN# [id number]   YOB: 1955 Age: 64 year old          Assessment and Plan:   Update: Patient has started to have formed stools. Shaking has improved but still present with alcohol withdrawal. Abdomen remains distended but much softer today. No evidence of large amount of ascites. Tolerating diet.     Active Problems:   Alcohol withdrawal (H)  Alcohol liver cirrhosis with ascites and esophageal varices  Jim Richard is a pleasant 64 year old male with history of alcohol abuse, alcoholic liver cirrhosis with ascites and esophageal varices with diarrhea for 3 days. GI consulted for continuation of care and history of cirrhosis. Patient has had exacerbation of symptoms associated with excessive alcohol intake. Underwent ultrasound guided paracentesis and had 6750 mL of serous fluid removed. Hemoglobin stable and at baseline. Has been noncompliant with medication. Has been started on propranolol with history of esophageal varices. Has had lactulose held with diarrhea and stools incontinence and diarrhea. Having 4-5 stools a day and now more formed. Has had diuretics- creat 1.09. Does complain of cramping and bloating with no sharp or localized abdominal pain. Likely IBS- dicyclomine has helped.     - Recommend to consider inpatient alcohol treatment given 4 admissions in last year for alcohol related problems  - Continue with spironolactone 100 mg and furosemide 40 mg daily  - Avoid lactulose until diarrhea resolves  - Continue with pantoprazole and propranolol  - If diarrhea continues can plan colonoscopy.  - Continue on dicyclomine for abdominal cramping             Alcoholic cirrhosis of liver with ascites (H)  Alcoholic intoxication with complication (H)  Suicidal ideation      Interval History:   no new complaints and doing well; no cp, sob, n/v/d, or abd pain.              Review of Systems:   C: NEGATIVE for fever,  chills, change in weight  E/M: NEGATIVE for ear, mouth and throat problems  R: NEGATIVE for significant cough or SOB  CV: NEGATIVE for chest pain, palpitations or peripheral edema             Medications:   I have reviewed this patient's current medications    dicyclomine  20 mg Oral 4x Daily AC & HS     DULoxetine  30 mg Oral Daily     fluticasone-vilanterol  1 puff Inhalation Daily     folic acid  1 mg Oral Daily     furosemide  40 mg Oral Daily     mirtazapine  15 mg Oral At Bedtime     multivitamin w/minerals  1 tablet Oral Daily     nicotine  1 patch Transdermal Daily     nicotine   Transdermal Q8H     pantoprazole  40 mg Oral QAM AC     propranolol  10 mg Oral BID     spironolactone  100 mg Oral Daily     traZODone  200 mg Oral At Bedtime     vitamin B1  100 mg Oral Daily                  Physical Exam:   Vitals were reviewed  Vital Signs with Ranges  Temp:  [97.9  F (36.6  C)-98.9  F (37.2  C)] 97.9  F (36.6  C)  Pulse:  [70-75] 75  Heart Rate:  [67-78] 76  Resp:  [16-18] 16  BP: (113-152)/(64-91) 128/83  SpO2:  [94 %-98 %] 96 %  I/O last 3 completed shifts:  In: 250 [P.O.:250]  Out: 250 [Urine:250]  Constitutional: healthy, alert and no distress   Cardiovascular: negative, PMI normal. No lifts, heaves, or thrills. RRR. No murmurs, clicks gallops or rub  Respiratory: negative, Percussion normal. Good diaphragmatic excursion. Lungs clear  Head: Normocephalic. No masses, lesions, tenderness or abnormalities  Neck: Neck supple. No adenopathy. Thyroid symmetric, normal size,, Carotids without bruits.  Abdomen: Abdomen soft, non-tender. BS normal. No masses, organomegaly             Data:   I reviewed the patient's new clinical lab test results.   Recent Labs   Lab Test 02/03/20  0218 01/16/20  0748 01/14/20  0838  01/07/20  0750 12/31/19  1146   WBC 7.9 3.9* 3.7*   < > 4.8 5.5   HGB 9.6* 9.4* 9.9*   < > 9.4* 9.9*   MCV 97 96 98   < > 95 96    175 143*   < > 85* 205   INR 1.18*  --   --   --  1.26* 1.24*     < > = values in this interval not displayed.     Recent Labs   Lab Test 02/04/20  0800 02/03/20  0218 01/16/20  0748   POTASSIUM 3.6 3.6 4.0   CHLORIDE 104 108 107   CO2 23 21 24   BUN 23 24 30   ANIONGAP 7 10 4     Recent Labs   Lab Test 02/04/20  0800 02/03/20  0218 01/14/20  0838 01/11/20  1730  11/13/19  1858 11/13/19  1849  02/13/19  2340  09/25/14  0510   ALBUMIN 2.3* 2.7* 2.8*  --    < > 3.0*  --    < > 3.1*   < >  --    BILITOTAL 2.5* 1.6* 1.2  --    < > 1.0  --    < > 0.4   < >  --    ALT 22 26 57  --    < > 24  --    < > 41   < >  --    AST 53* 61* 78*  --    < > 58*  --    < > 89*   < >  --    PROTEIN  --   --   --  Negative  --   --  30*  --   --   --  Negative   LIPASE  --  369  --   --   --  499*  --   --  536*   < >  --     < > = values in this interval not displayed.       I reviewed the patient's new imaging results.    All laboratory data reviewed  All imaging studies reviewed by me.    Gloria De Leon PA-C,  2/5/2020  [facility]  Office : [phone]  Cell: [phone] (Dr. Shaw)  Cell: [phone] (Gloria De Leon PA-C)

## 2020-02-05 NOTE — PROGRESS NOTES
[facility]    Hospitalist Progress Note      Assessment & Plan   Jim Richard is a 64 year old male who was admitted on 2/3/2020. His history is significant for cirrhosis (w/ ascites, esophageal varices), EtOH use disorder, depression/anxiety, COPD, sleep apnea, anemia, admitted for EtOH intoxication and SI.    He was recently hospitalized at [facility] for weakness in EtOH withdrawal, and was seen by GI, CD, and Psych. It appears the initial plan was for him to go to TCU w/ close GI f/u to adjust his diuretics, but he refused TCU and went home. Since going home he has been drinking, medication compliance sporadic, with worsening abdominal ascites. He came in for SI in the setting of drinking.    Cirrhosis with ascites  Esophageal varices  Diuretics held since last admission. Had paracentesis on 02/03/20 w/ 6.7 L serous fluid removed.  - GI recs:   - hold lactulose until diarrhea resolves.   - consider colonoscopy if diarrhea persists   - PTA pantoprazole   - PTA propranolol   - spironolactone 100 daily   - furosemide 40 daily   - dicyclomine for GI cramps    Alcohol intoxication  Alcohol use disorder  Drinking 750 ml vodka daily. Etoh level on admission at 0.39. h/o DT's, no h/o withdrawal seizures. S/p NS w/ thiamine & folic acid.  - thiamine 100 daily  - folic acid 1 mg daily  - d'c IVF given pt's ascites  - CIWA - since pt has tremors despite PRN Ativan, switch to PRN Valium  - scheduled 1 mg IV Ativan q8h for 6 doses (2/5-7)    Depression with suicidal ideation  Anxiety  Had SI before coming into hospital due to drinking, although no longer w/ SI.  - Psych recs:   - f/u w/ SW regarding hospital-based detox options, either [place] or [place]   - consider commitment if he demands to leave   - Seroquel 50 at bedtime PRN   - mirtazapine 15 at bedtime   - PTA trazodone 200 at bedtime (listed as 100 at bedtime in pt's chart)   - PTA duloxetine 30 daily  - CD consult: plan to refer pt to [facility]  [place]    Lactic acidosis  2.4 on 02/05/20 during RRT for shaking symptoms. No s/s infection. No leukocytosis. Pt's lactate is likely falsely elevated b/c cirrhosis.  - repeat in 2/6 AM  - no IVF for now given ascites/edema     JANIS on CPAP  CPAP per home settings     COPD  Tobacco abuse  PTA prn albuterol, Breo Ellipta  - continue inhalers    Anemia  Baseline 9-10 recent. Presumed 2/2 Etoh, liver disease, varices    LE swelling  LLE slightly more edematous than RLE on 02/05/20. US DVT negative.      DVT Prophylaxis: Pneumatic Compression Devices  Code Status: DNR/DNI  Expected discharge: >2-3 days pending monitoring of EtOH withdrawal and dispo planning    Efrain Aceves MD  Text Page (7am - 6pm, M-F)      Interval History   More tremulous on 02/05/20 with anxiety/agitation, although pt is still alert and oriented during the episodes w/o tongue biting, incontinence. Pt states these symptoms are typical of his EtOH withdrawal.  No fevers, SOB, chest pain, N/V.    -Data reviewed today: I reviewed all new labs and imaging results over the last 24 hours.      Physical Exam   Temp: 99.1  F (37.3  C) Temp src: Oral BP: (!) 146/51 Pulse: 75 Heart Rate: 68 Resp: 18 SpO2: 99 % O2 Device: Nasal cannula Oxygen Delivery: 2 LPM  Vitals:    02/03/20 0800   Weight: 89.6 kg (197 lb 8.5 oz)     Vital Signs with Ranges  Temp:  [97.4  F (36.3  C)-99.1  F (37.3  C)] 99.1  F (37.3  C)  Pulse:  [70-75] 75  Heart Rate:  [61-78] 68  Resp:  [16-26] 18  BP: (105-152)/(51-91) 146/51  SpO2:  [94 %-100 %] 99 %  I/O last 3 completed shifts:  In: -   Out: 250 [Urine:250]    General: tremulous, mildly agitated  HEENT: NC/AT, moist mucus membranes  Heart: RRR no m/r/g  Lungs: CTAB no crackles or wheezes  Abdomen: NABS, non-tender, +distended; no rebound or guarding; no HSM or masses.  Extremities: warm, well-perfused. 2+ bilateral ankle pitting edema.  Neuro: CN II-XII grossly intact, moving extremities spontaneously. No asterixis    Medications        dicyclomine  20 mg Oral 4x Daily AC & HS     DULoxetine  30 mg Oral Daily     fluticasone-vilanterol  1 puff Inhalation Daily     folic acid  1 mg Oral Daily     furosemide  40 mg Oral Daily     LORazepam  1 mg Intravenous Q8H     mirtazapine  15 mg Oral At Bedtime     multivitamin w/minerals  1 tablet Oral Daily     nicotine  1 patch Transdermal Daily     nicotine   Transdermal Q8H     pantoprazole  40 mg Oral QAM AC     propranolol  10 mg Oral BID     spironolactone  100 mg Oral Daily     traZODone  200 mg Oral At Bedtime     vitamin B1  100 mg Oral Daily       Data   Recent Labs   Lab 02/05/20  1148 02/04/20  0800 02/03/20  0218   WBC 4.1  --  7.9   HGB 9.0*  --  9.6*   MCV 96  --  97   PLT 58*  --  153   INR  --   --  1.18*    134 139   POTASSIUM 3.6 3.6 3.6   CHLORIDE 104 104 108   CO2 27 23 21   BUN 22 23 24   CR 1.19 1.09 1.01   ANIONGAP 5 7 10   KEV 8.4* 7.6* 7.9*   GLC 99 86 76   ALBUMIN  --  2.3* 2.7*   PROTTOTAL  --  5.9* 7.0   BILITOTAL  --  2.5* 1.6*   ALKPHOS  --  234* 286*   ALT  --  22 26   AST  --  53* 61*   LIPASE  --   --  369       Recent Results (from the past 24 hour(s))   US Lower Extremity Venous Duplex Left    Narrative    VENOUS ULTRASOUND LEFT LEG February 5, 2020 12:56 PM     HISTORY: Left leg swelling, greater than right leg swelling. Check for  deep vein thrombosis.    COMPARISON: 07/10/2019.    FINDINGS: Examination of the deep veins with graded compression and  color flow Doppler with spectral wave form analysis shows no evidence  of thrombus in the common femoral vein, femoral vein, popliteal vein  or calf veins.  There is no venous insufficiency.       Impression    IMPRESSION: No evidence of deep venous thrombosis.     PATRIA LEONG MD

## 2020-02-05 NOTE — PLAN OF CARE
PT/OT: Per chart review & communication with RN, pt actively withdrawing & not appropriate for therapies today.

## 2020-02-05 NOTE — PLAN OF CARE
DATE & TIME: 2/05/20 0383-0008      Cognitive Concerns/ Orientation : disoriented x3, intermittent periods of oriented to place and time.   BEHAVIOR & AGGRESSION TOOL COLOR: Yellow, verbally redirectable.   CIWA SCORE: 12,12,11,12,8. Pt received a total of 2mg PO, 5mg IV, and 5mg Valium      ABNL VS/O2: VSS, pt placed on 2L due to lethargy and pt's hx of JANIS.   MOBILITY:no out of bed activity this shift, pt disoriented and tremelous. Seizure pads placed.   PAIN MANAGMENT: Denies  DIET: 2 g Na+  BOWEL/BLADDER: Incontinent of B/B, no bm this shift.   ABNL LAB/BG: Mg 1.2, replacement protocol ordered and currently ordered, lactic 2.4 (MD aware), Hgb 9.0, platelets (58).   DRAIN/DEVICES: PIV L arm saline locked, New IV placed in posterior left lower forearm, mag infusing.   TELEMETRY RHYTHM: NSR, radial pulse regular.   SKIN: Skin tear R forearm, dressing applied; scattered bruising;  groin excoriated, powder applied, bottom has blanchable redness, barrier cream applied.   TESTS/PROCEDURES: LLE US, negative for DVT.   D/C DAY/GOALS/PLACE: Discharge pending improvement in withdrawal symptoms and safe disposition plan  OTHER IMPORTANT INFO: Nicotine patch in place on R arm, PT/OT ordered; CD/ SW following. RRT called today d/t concern of increased lethargy, tremors and seizure like activity during shift. Scheduled ativan q 8, and Valium per CIWA protocol.

## 2020-02-05 NOTE — PLAN OF CARE
DATE & TIME: 4157-2103; 2/4/2020    Cognitive Concerns/ Orientation : A & O x 2 (disoriented to place & situation); confused and impulsive frequently.   BEHAVIOR & AGGRESSION TOOL COLOR: Green  CIWA SCORE: 6 (1600), 12 (1700), 0 (1819), 6 (2000),4 (2127), 13 (2305) (tremors, headache, nausea & anxiety), Ativan given x 2 for anxiety at 1719 and 2308.  ABNL VS/O2: VSS on room air.   MOBILITY: x 2 assist; very unsteady on feet. Reported episodes of dizziness and lightheadedness.   PAIN MANAGMENT: Patient reported headache pain 7/10. Tylenol given x 1 for pain management. Pt. Denied cold pack.   DIET:  2g NA diet  BOWEL/BLADDER: BS active x 4. Incontinent of bowel and bladder. Several bowel movements this evening.   ABNL LAB/BG: ALT=22; AST; 53 Bilirubin= 2.5; Alkaline phosphatase= 234  DRAIN/DEVICES: PIV/SL     TELEMETRY RHYTHM: SR + BBB    SKIN: Pale, dry, scattered bruising on URE and ULE. Puncture wound on Lower left abdomin (bandage CDI).   TESTS/PROCEDURES: Paracentesis completed yesterday (2/3/2020).   D/C DAY/GOALS/PLACE: pending discharge   OTHER IMPORTANT INFO: Patient fell this evening when transferring from chair to comode. Does not use call light appropriately when trying to exit bed. M.D. notified. NP assessed patient this evening. No injuries from fall. Family updated on pt. Status. Put on tele this evening. Nicotine patch in place on left shoulder. CIWA and Neuro checks q4h. Nursing will continue to monitor and assess.     Janeth Mojica, RN on 2/4/2020 at 10:08 PM

## 2020-02-05 NOTE — CONSULTS
2/5/2020 chem dep consult acknowledged.  Confirmed with [facility] that they will need a Rule 25 for referral for their inpatient program.  I attempted to meet with patient, RN was present and reported he was unable to participate due to active withdrawals and has been given lots of Ativan.  Chem dep will close consult at this time given patient is not appropriate.  Please reorder once patient is A&O and able to participate in interview.

## 2020-02-05 NOTE — CODE/RAPID RESPONSE
[facility]    RRT Note  2/5/2020   Time Called: 1108    RRT called for: shaking, question seizures    Assessment & Plan   IMPRESSION & PLAN:    Jim Richard is a 64 year old male with PMH of chronic decompensated alcoholic cirrhosis with ascites and esophageal varices, alcohol abuse, depression, anxiety, COPD, JANIS, anemia who was admitted on 2/3/2020 for acute alcohol intoxication with BAC 0.39 had suicidal ideation.  He has been treated with multivitamins, folic acid, thiamine and CIWA monitoring with as needed benzodiazepines.  On the day of admission, 2/3/2020 he required 2 mg lorazepam, on 2/4/2020 he required 7 mg lorazepam, on 2/5/2020 by the end of this RRT he is up to 8 mg lorazepam.    RN staff was coming in to reposition and do a linen change and see what evaluation.  They found him with whole body shaking somewhat seizure-like activity albeit with brisk resolution.  I was able to witness 1 of these episodes during RRT.  His whole body is shaking all over is very tremulous.  He was awake during the event was able to talk.  This has occurred at least 3-4 times over the course of today prior to my arrival.  Because of the recurrent nature of these episodes RRT was called.  Staff described them as lasting less than 30 seconds.  There is no clear postictal phase following these events.  Patient was incontinent although he is on diuretics.  Staff also report that he is more confused today and had been oriented x4 on 2/4/2020.  Most recent CIWA score was 11.    On my arrival heart rate is 66, respiratory rate is 22, SPO2 99% on 2 L nasal cannula /63.  Patient has nystagmus, tongue fasciculations, very tremulous hands.  Interestingly he is not hypertensive nor tachycardic though I do note he is on propranolol.  He is not diaphoretic.  He is disoriented to place and time talking nonsensically and required assist with feeding this morning when he previously had been doing  independently    Differential diagnosis:  Spells of shaking with differential including seizures there was no postictal state but could be partial, withdrawal symptomatology.  Also considered rigors.  Oral temp was 99.1 and he is immunocompromise from his cirrhosis.    INTERVENTIONS:  -1mg lorazepam stat now  -stat BMP, Mag, phos, CBC, lactic acid  -seizure precautions  -after witnessing an episode of whole body shaking have medicated patient with 2 additional milligrams of IV lorazepam  -Glucose was 103 mg/dL  -sitter for frequent reorientation    -disposition: Patient may stay on this unit current level of nursing care.    At the end of the RRT total of 3 mg of lorazepam had been given totaling 8 mg since 7 AM on 2/5/2020.  I discussed the case with patient's rounding hospitalist attending physician.  Likely the patient is at his peak of withdrawal and so we will schedule low-dose lorazepam 1 mg every 8 hours with hold parameters for 2days in addition to CIWA dosing, laboratory data have been obtained    Discussed with and defer further cares to rounding hospitalist attending physician Dr. Aceves.     Code Status: DNR/DNI    Allergies   Allergies   Allergen Reactions     Amlodipine Swelling     Lisinopril      Other reaction(s): Angioedema  Mouth and tongue swelling   Mouth and tongue swelling          Physical Exam   Vital Signs with Ranges:  Temp:  [97.9  F (36.6  C)-98.9  F (37.2  C)] 97.9  F (36.6  C)  Pulse:  [70-75] 75  Heart Rate:  [67-78] 68  Resp:  [16-22] 22  BP: (113-152)/(63-91) 145/63  SpO2:  [94 %-100 %] 100 %  I/O last 3 completed shifts:  In: 250 [P.O.:250]  Out: 250 [Urine:250]    Constitutional:no acute distress  Neuro: +follows commands wiggle toes and show 2 fingers bilat, face symmetric, tongue midline, speech fluent but disoriented to person place and time.  Very shaky extremities, tongue fasciculations and nystagmus.  HEENT:sclera nonicteric, noninjected, conjunctiva pink, mouth moist oral  mucosa  Neck: supple  Heart: S1S2, normal sinus rhythm, normotensive  Lungs: CTAB upper and lower lobes  Abd:normoactive bowel sounds, soft, nontender, nondisteded  Ext: +1 bilateral lower extremity edema, left ankle is bigger than the right and somewhat erythematous    Data     Troponin:    Recent Labs   Lab Test 04/26/18  1940   TROPI <0.015     CBC with Diff:  Recent Labs   Lab Test 02/03/20  0218   WBC 7.9   HGB 9.6*   MCV 97      INR 1.18*        Comprehensive Metabolic Panel:  Recent Labs   Lab 02/04/20  0800 02/03/20  0218    139   POTASSIUM 3.6 3.6   CHLORIDE 104 108   CO2 23 21   ANIONGAP 7 10   GLC 86 76   BUN 23 24   CR 1.09 1.01   GFRESTIMATED 70 77   GFRESTBLACK 82 89   KEV 7.6* 7.9*   MAG  --  1.6   PROTTOTAL 5.9* 7.0   ALBUMIN 2.3* 2.7*   BILITOTAL 2.5* 1.6*   ALKPHOS 234* 286*   AST 53* 61*   ALT 22 26       Time Spent on this Encounter   I spent 40 minutes (1110-1150am) on the unit/floor managing the care of Jim CALDERON [place]. Over 50% of my time was spent counseling the patient and/or coordinating care regarding services listed in this note.    Gloria Hallman, [facility]  Hospitalist [facility]  [phone]

## 2020-02-05 NOTE — PLAN OF CARE
DATE & TIME: 2/05/20   Cognitive Concerns/ Orientation : A&O x2-3, disoriented to time and place at times   BEHAVIOR & AGGRESSION TOOL COLOR: Green  CIWA SCORE: 12, 14, 7, 7.   3 mg PO Ativan given this shift    ABNL VS/O2: VSS except HTN on room air   MOBILITY: Assist of 2 +gb/walker to bedside commode; pt very tremulous and unsteady, no out of bed activity this shift   PAIN MANAGMENT: Mild headache improved with PO Ativan  DIET: 2 g Na+  BOWEL/BLADDER: Incontinent of B/B, no bm this shift   ABNL LAB/BG: None  DRAIN/DEVICES: PIV L arm saline locked  TELEMETRY RHYTHM: NSR with BBB  SKIN: Skin tear R forearm, dressing applied; scattered bruising;  groin excoriated, powder applied  TESTS/PROCEDURES: None  D/C DAY/GOALS/PLACE: Discharge pending improvement in withdrawal symptoms and safe disposition plan  OTHER IMPORTANT INFO: PRN Seroquel given x1 to help with sleep, effective. Nicotine patch in place on L arm, neuros intact.  PT/OT ordered; CD/ SW following

## 2020-02-06 PROBLEM — R45.851 SUICIDAL IDEATION: Status: ACTIVE | Noted: 2017-12-21

## 2020-02-06 PROBLEM — K70.30 ALCOHOLIC CIRRHOSIS OF LIVER WITHOUT ASCITES (H): Status: ACTIVE | Noted: 2020-02-06

## 2020-02-06 LAB
ERYTHROCYTE [DISTWIDTH] IN BLOOD BY AUTOMATED COUNT: 14.6 % (ref 10–15)
HCT VFR BLD AUTO: 25.7 % (ref 40–53)
HGB BLD-MCNC: 8.4 G/DL (ref 13.3–17.7)
HGB BLD-MCNC: 8.6 G/DL (ref 13.3–17.7)
INR PPP: 1.29 (ref 0.86–1.14)
LACTATE BLD-SCNC: 0.5 MMOL/L (ref 0.7–2)
MAGNESIUM SERPL-MCNC: 2 MG/DL (ref 1.6–2.3)
MCH RBC QN AUTO: 31.7 PG (ref 26.5–33)
MCHC RBC AUTO-ENTMCNC: 32.7 G/DL (ref 31.5–36.5)
MCV RBC AUTO: 97 FL (ref 78–100)
PLATELET # BLD AUTO: 49 10E9/L (ref 150–450)
RBC # BLD AUTO: 2.65 10E12/L (ref 4.4–5.9)
WBC # BLD AUTO: 3.9 10E9/L (ref 4–11)

## 2020-02-06 PROCEDURE — 83735 ASSAY OF MAGNESIUM: CPT | Performed by: NURSE PRACTITIONER

## 2020-02-06 PROCEDURE — 25000132 ZZH RX MED GY IP 250 OP 250 PS 637: Mod: GY | Performed by: PHYSICIAN ASSISTANT

## 2020-02-06 PROCEDURE — 25000132 ZZH RX MED GY IP 250 OP 250 PS 637: Mod: GY | Performed by: INTERNAL MEDICINE

## 2020-02-06 PROCEDURE — 36415 COLL VENOUS BLD VENIPUNCTURE: CPT | Performed by: PHYSICIAN ASSISTANT

## 2020-02-06 PROCEDURE — 85027 COMPLETE CBC AUTOMATED: CPT | Performed by: NURSE PRACTITIONER

## 2020-02-06 PROCEDURE — 25000128 H RX IP 250 OP 636: Performed by: INTERNAL MEDICINE

## 2020-02-06 PROCEDURE — 36415 COLL VENOUS BLD VENIPUNCTURE: CPT | Performed by: INTERNAL MEDICINE

## 2020-02-06 PROCEDURE — 85610 PROTHROMBIN TIME: CPT | Performed by: PHYSICIAN ASSISTANT

## 2020-02-06 PROCEDURE — 99232 SBSQ HOSP IP/OBS MODERATE 35: CPT | Performed by: INTERNAL MEDICINE

## 2020-02-06 PROCEDURE — 83605 ASSAY OF LACTIC ACID: CPT | Performed by: INTERNAL MEDICINE

## 2020-02-06 PROCEDURE — 25000132 ZZH RX MED GY IP 250 OP 250 PS 637: Mod: GY | Performed by: HOSPITALIST

## 2020-02-06 PROCEDURE — 12000000 ZZH R&B MED SURG/OB

## 2020-02-06 PROCEDURE — 85018 HEMOGLOBIN: CPT | Performed by: PHYSICIAN ASSISTANT

## 2020-02-06 RX ADMIN — MIRTAZAPINE 15 MG: 15 TABLET, FILM COATED ORAL at 21:23

## 2020-02-06 RX ADMIN — NICOTINE 1 PATCH: 14 PATCH, EXTENDED RELEASE TRANSDERMAL at 11:41

## 2020-02-06 RX ADMIN — DICYCLOMINE HYDROCHLORIDE 20 MG: 20 TABLET ORAL at 16:20

## 2020-02-06 RX ADMIN — LORAZEPAM 1 MG: 2 INJECTION INTRAMUSCULAR; INTRAVENOUS at 12:05

## 2020-02-06 RX ADMIN — PROPRANOLOL HYDROCHLORIDE 10 MG: 10 TABLET ORAL at 21:23

## 2020-02-06 RX ADMIN — LORAZEPAM 1 MG: 2 INJECTION INTRAMUSCULAR; INTRAVENOUS at 04:04

## 2020-02-06 RX ADMIN — TRAZODONE HYDROCHLORIDE 200 MG: 50 TABLET ORAL at 21:23

## 2020-02-06 RX ADMIN — LORAZEPAM 1 MG: 2 INJECTION INTRAMUSCULAR; INTRAVENOUS at 21:22

## 2020-02-06 RX ADMIN — DICYCLOMINE HYDROCHLORIDE 20 MG: 20 TABLET ORAL at 21:23

## 2020-02-06 ASSESSMENT — ACTIVITIES OF DAILY LIVING (ADL)
ADLS_ACUITY_SCORE: 13
ADLS_ACUITY_SCORE: 20
ADLS_ACUITY_SCORE: 13
AMBULATION: 0-->INDEPENDENT
RETIRED_COMMUNICATION: 0-->UNDERSTANDS/COMMUNICATES WITHOUT DIFFICULTY
SWALLOWING: 0-->SWALLOWS FOODS/LIQUIDS WITHOUT DIFFICULTY
BATHING: 0-->INDEPENDENT
WHICH_OF_THE_ABOVE_FUNCTIONAL_RISKS_HAD_A_RECENT_ONSET_OR_CHANGE?: AMBULATION;TRANSFERRING
ADLS_ACUITY_SCORE: 13
ADLS_ACUITY_SCORE: 18
RETIRED_EATING: 0-->INDEPENDENT
ADLS_ACUITY_SCORE: 13
COGNITION: 0 - NO COGNITION ISSUES REPORTED
TRANSFERRING: 0-->INDEPENDENT
TOILETING: 0-->INDEPENDENT
DRESS: 0-->INDEPENDENT

## 2020-02-06 NOTE — PLAN OF CARE
DATE & TIME: 2/6/2020 5724-4530    Cognitive Concerns/ Orientation : Oriented to self only; lethargic and confused most of shift, has periods of alertness where he is A&Ox4.    BEHAVIOR & AGGRESSION TOOL COLOR: Green  CIWA SCORE: (5) (6) scoring for tremors, anxiety, and orientation. No Valium given.         ABNL VS/O2: VSS on 2L at night, hx sleep apnea.   MOBILITY: In bed throughout shift, pt still tremulous and lethargic. During periods of alertness pt was up in chair.   PAIN MANAGMENT: denied   DIET: 2g sodium  BOWEL/BLADDER: BS active, incontinent of bowel and bladder. No BM on shift.   ABNL LAB/BG: Hgb, 8.4, Platelets of 49, INR 1.29. Watch bowel for melena.   DRAIN/DEVICES: 1 PIV SL   TELEMETRY RHYTHM: NSR  SKIN: Skin tear R forearm, new dressing, CDI. Groin excoriated, powder applied during incontinence care. Blanchable redness on coccyx, barrier cream applied. Scattered bruising. Bed bath today.   TESTS/PROCEDURES: Ultrasound on LLE for DVT, negative.   D/C DAY/GOALS/PLACE: pending withdrawal symptoms improving and safe disposition plan   OTHER IMPORTANT INFO: Sitter in place. Nicotine patch in place. PT/OT/SW following. Scheduled ativan q8hrs. Pt looking better today on shift.

## 2020-02-06 NOTE — PROVIDER NOTIFICATION
MD Notification    Notified Person: MD    Notified Person Name: Dr. Orellana    Notification Date/Time: 2/6/2020 at 0920    Notification Interaction: Web-based paging    Purpose of Notification: Platelets of 49    Orders Received: TBD    Comments:NA

## 2020-02-06 NOTE — PLAN OF CARE
DATE & TIME: 9902-1475; 2/5/2020   Cognitive Concerns/ Orientation : lethargic and confused; disoriented x 2 (situation and place); impulsive and confused at times.   BEHAVIOR & AGGRESSION TOOL COLOR: Green   CIWA SCORE: 6 (1609), 7 (1649); 7 (2049) for mild anxiety, tremors, mild visual hallucinations. No interventions needed.   ABNL VS/O2: VSS on room air/2 L for comfort & Hx. JANIS   MOBILITY: x 2 assist; gait belt and walker; very unsteady on feet; remained in bed during shift.   PAIN MANAGMENT: patient reported pain 7/10. Tylenol given x 1 for pain management. Pain relieved to 6/10 after interventions.   DIET: 2g Na+   BOWEL/BLADDER: BS active x 4; Incontinent of bowel and bladder.   ABNL LAB/BG: Mg= 1.2 (replaced/re-check in a.m.); Lactic 2.4 (M.D aware)   DRAIN/DEVICES: PIV L arm/SL & PIV L posterior forearm.   TELEMETRY RHYTHM: NS   SKIN: Dry, pale, scattered bruising, skin tear in R forearm, groin red; changed  TESTS/PROCEDURES: Tested LLE for DVT. Results = neg   D/C DAY/GOALS/PLACE: Pending discharge until symptoms improve  OTHER IMPORTANT INFO: sitter at bedside. Nicotine patch in place on L shoulder.PT, OT, and SW following. Nursing will continue to monitor and assess.     Janeth Mojica, RN on 2/5/2020 at 10:52 PM

## 2020-02-06 NOTE — PROGRESS NOTES
DATE & TIME: 2/6/2020 0985-2760    Cognitive Concerns/ Orientation :  Oriented to self only; lethargic and confused; garbled speech.   BEHAVIOR & AGGRESSION TOOL COLOR: green  CIWA SCORE: 7,6 scoring for tremors, anxiety, and orientation    ABNL VS/O2: VSS on 2L at night, hx sleep apnea.   MOBILITY: In bed throughout shift, pt very tremulous and disoriented. Independently positioning in bed, turn as needed.    PAIN MANAGMENT: denied   DIET: 2g sodium  BOWEL/BLADDER: BS active, incontinent of bowel and bladder. No BM on shift   ABNL LAB/BG: Mg 1.2, replaced, recheck in AM. Lactic 2.4  DRAIN/DEVICES: 1 PIV SL   TELEMETRY RHYTHM: NSR  SKIN: Skin tear R forearm, dressing CDI. Groin excoriated, powder applied during incontinence care. Blanchable redness on coccyx. Scattered bruising   TESTS/PROCEDURES: Ultrasound on LLE for DVT, negative.   D/C DAY/GOALS/PLACE: pending withdrawal symptoms improving and safe disposition plan   OTHER IMPORTANT INFO: Sitter in place. Nicotine patch in place. PT/OT/SW following. Scheduled ativan q8hrs.

## 2020-02-06 NOTE — PROGRESS NOTES
[facility]  Gastroenterology Progress Note     Jim Richard MRN# [id number]   YOB: 1955 Age: 64 year old          Assessment and Plan:    Update: Patient had drop in hemoglobin from 9.0 to 8.4 and platelets decreased to 49.  Had severe withdrawal symptoms and given Ativan. Unable to wake patient. Abdomen soft. No stools this a.m. NO report of melena.     Active Problems:   Alcohol withdrawal (H)  Alcohol liver cirrhosis with ascites and esophageal varices  Jim Richard is a pleasant 64 year old male with history of alcohol abuse, alcoholic liver cirrhosis with ascites and esophageal varices with diarrhea for 3 days. GI consulted for continuation of care and history of cirrhosis. Patient has had exacerbation of symptoms associated with excessive alcohol intake. Underwent ultrasound guided paracentesis and had 6750 mL of serous fluid removed. Hemoglobin stable and at baseline. Has been noncompliant with medication. Has been started on propranolol with history of esophageal varices. Has had lactulose held with diarrhea and stools incontinence and diarrhea. Having 4-5 stools a day and now more formed. Has had diuretics- creat 1.19. Does complain of cramping and bloating with no sharp or localized abdominal pain. Likely IBS- dicyclomine has helped.     - Recommend to consider inpatient alcohol treatment given 4 admissions in last year for alcohol related problems  - Continue with spironolactone 100 mg and furosemide 40 mg daily  - May restart lactulose if stools less than 3 a day.  - Repeat hemoglobin in 4-6 hours- if continues to decrease will need EGD  - Continue with pantoprazole and propranolol  - If diarrhea continues can plan colonoscopy.  - Continue on dicyclomine for abdominal cramping            Alcoholic cirrhosis of liver with ascites (H)  Alcoholic intoxication with complication (H)  Suicidal ideation      Interval History:   Patient sleeping unable to get a updated  history              Review of Systems:   C: NEGATIVE for fever, chills, change in weight  E/M: NEGATIVE for ear, mouth and throat problems  R: NEGATIVE for significant cough or SOB  CV: NEGATIVE for chest pain, palpitations or peripheral edema             Medications:   I have reviewed this patient's current medications    dicyclomine  20 mg Oral 4x Daily AC & HS     DULoxetine  30 mg Oral Daily     fluticasone-vilanterol  1 puff Inhalation Daily     folic acid  1 mg Oral Daily     furosemide  40 mg Oral Daily     LORazepam  1 mg Intravenous Q8H     mirtazapine  15 mg Oral At Bedtime     multivitamin w/minerals  1 tablet Oral Daily     nicotine  1 patch Transdermal Daily     nicotine   Transdermal Q8H     pantoprazole  40 mg Oral QAM AC     propranolol  10 mg Oral BID     spironolactone  100 mg Oral Daily     traZODone  200 mg Oral At Bedtime     vitamin B1  100 mg Oral Daily                  Physical Exam:   Vitals were reviewed  Vital Signs with Ranges  Temp:  [97.1  F (36.2  C)-99.1  F (37.3  C)] 97.9  F (36.6  C)  Heart Rate:  [56-68] 57  Resp:  [18-26] 20  BP: (103-146)/(51-69) 103/62  SpO2:  [95 %-100 %] 98 %  No intake/output data recorded.  Constitutional: somnolent   Cardiovascular: negative, PMI normal. No lifts, heaves, or thrills. RRR. No murmurs, clicks gallops or rub  Respiratory: negative, Percussion normal. Good diaphragmatic excursion. Lungs clear  Head: Normocephalic. No masses, lesions, tenderness or abnormalities  Neck: Neck supple. No adenopathy. Thyroid symmetric, normal size,, Carotids without bruits.  Abdomen: Abdomen soft, non-tender. BS normal. No masses, organomegaly           Data:   I reviewed the patient's new clinical lab test results.   Recent Labs   Lab Test 02/06/20  0805 02/05/20  1148 02/03/20  0218  01/07/20  0750 12/31/19  1146   WBC 3.9* 4.1 7.9   < > 4.8 5.5   HGB 8.4* 9.0* 9.6*   < > 9.4* 9.9*   MCV 97 96 97   < > 95 96   PLT 49* 58* 153   < > 85* 205   INR  --   --  1.18*   --  1.26* 1.24*    < > = values in this interval not displayed.     Recent Labs   Lab Test 02/05/20  1148 02/04/20  0800 02/03/20  0218   POTASSIUM 3.6 3.6 3.6   CHLORIDE 104 104 108   CO2 27 23 21   BUN 22 23 24   ANIONGAP 5 7 10     Recent Labs   Lab Test 02/04/20  0800 02/03/20  0218 01/14/20  0838 01/11/20  1730  11/13/19  1858 11/13/19  1849  02/13/19  2340  09/25/14  0510   ALBUMIN 2.3* 2.7* 2.8*  --    < > 3.0*  --    < > 3.1*   < >  --    BILITOTAL 2.5* 1.6* 1.2  --    < > 1.0  --    < > 0.4   < >  --    ALT 22 26 57  --    < > 24  --    < > 41   < >  --    AST 53* 61* 78*  --    < > 58*  --    < > 89*   < >  --    PROTEIN  --   --   --  Negative  --   --  30*  --   --   --  Negative   LIPASE  --  369  --   --   --  499*  --   --  536*   < >  --     < > = values in this interval not displayed.       I reviewed the patient's new imaging results.    All laboratory data reviewed  All imaging studies reviewed by me.    Gloria De Leon PA-C,  2/6/2020  [facility]  Office : [phone]  Cell: [phone] (Dr. Shaw)  Cell: [phone] (Gloria De Leon PA-C)

## 2020-02-06 NOTE — PROGRESS NOTES
[facility]    Hospitalist Progress Note      Assessment & Plan   Jim Richard is a 64 year old male who was admitted on 2/3/2020. His history is significant for cirrhosis (w/ ascites, esophageal varices), EtOH use disorder, depression/anxiety, COPD, sleep apnea, anemia, admitted for EtOH intoxication and SI.     He was recently hospitalized at [facility] for weakness in EtOH withdrawal, and was seen by GI, CD, and Psych. It appears the initial plan was for him to go to TCU w/ close GI f/u to adjust his diuretics, but he refused TCU and went home. Since going home he has been drinking, medication compliance sporadic, with worsening abdominal ascites. He came in for SI in the setting of drinking.    # Alcohol Withdrawal  CIWA score 5 this morning, up to 12 in past 24 hours  - will re-order Chemical Dependence evaluation once mental status improves  - will need inpatient alcohol rehab placement when stable  - cont CIWA protocol  - cont scheduled Ativan 1mg IVP Q8h thru 2/7  - cont daily thiamine, folate, MVI    # Alcoholic Cirrhosis with Esophageal Varices  - appreciate Gastroenterology assistance  - cont Spironolactone 100mg PO daily  - cont Lasix 40mg PO daily  - cont Propranolol 10mg PO BID  - hold Lactulose in setting of diarrhea    # Anemia with hx of Esophageal Varices: repeat Hgb 8.6, stable  - appreciate GI assistance  - monitor Hgb    # Diarrhea: no episodes today  - appreciate GI assistance  - will cont to monitor  - may need colonoscopy if diarrhea continues    # Depression with Suicidal Ideation: reported SI prior to hospitalization although no further SI noted after arrival  - appreciate Psychiatry assistance  - cont Duloxetine, Mirtazapine, Trazodone, prn Seroquel    # FEN  - low Na diet    # Dispo  - will need re-evaluation by CD team  - plan for inpatient alcohol rehab when medically stable    DVT Prophylaxis: Pneumatic Compression Devices  Code Status: DNR/DNI  Expected discharge:  4 - 7 days, recommended to alcohol rehab once medically stable.    Bang Orellana MD  Text page (7am - 6pm)    Interval History   - asleep this morning    -Data reviewed today: I reviewed all new labs and imaging results over the last 24 hours.    Physical Exam   Temp: 97.9  F (36.6  C) Temp src: Axillary BP: 103/62   Heart Rate: 57 Resp: 20 SpO2: 98 % O2 Device: Nasal cannula Oxygen Delivery: 2 LPM  Vitals:    02/03/20 0800   Weight: 89.6 kg (197 lb 8.5 oz)     Vital Signs with Ranges  Temp:  [97.1  F (36.2  C)-99.1  F (37.3  C)] 97.9  F (36.6  C)  Heart Rate:  [56-68] 57  Resp:  [18-26] 20  BP: (103-146)/(51-69) 103/62  SpO2:  [95 %-100 %] 98 %  No intake/output data recorded.    Constitutional: asleep, minimally arousable, NAD  Respiratory: Clear to auscultation bilaterally, no crackles or wheezing.  Cardiovascular: Regular rate and rhythm, normal S1 and S2, and no murmur noted.  GI: Soft, non-distended, non-tender, normal bowel sounds.  Skin: No rashes, no cyanosis, no edema.  Musculoskeletal: No joint swelling, erythema or tenderness.  Neurologic: Cranial nerves 2-12 intact, normal strength and sensation.  Psychiatric: Alert, oriented to person, place and time, no obvious anxiety or depression.    Medications       dicyclomine  20 mg Oral 4x Daily AC & HS     DULoxetine  30 mg Oral Daily     fluticasone-vilanterol  1 puff Inhalation Daily     folic acid  1 mg Oral Daily     furosemide  40 mg Oral Daily     LORazepam  1 mg Intravenous Q8H     mirtazapine  15 mg Oral At Bedtime     multivitamin w/minerals  1 tablet Oral Daily     nicotine  1 patch Transdermal Daily     nicotine   Transdermal Q8H     pantoprazole  40 mg Oral QAM AC     propranolol  10 mg Oral BID     spironolactone  100 mg Oral Daily     traZODone  200 mg Oral At Bedtime     vitamin B1  100 mg Oral Daily       Data   Recent Labs   Lab 02/06/20  0805 02/05/20  1148 02/04/20  0800 02/03/20  0218   WBC 3.9* 4.1  --  7.9   HGB 8.4* 9.0*  --  9.6*   MCV  97 96  --  97   PLT 49* 58*  --  153   INR  --   --   --  1.18*   NA  --  136 134 139   POTASSIUM  --  3.6 3.6 3.6   CHLORIDE  --  104 104 108   CO2  --  27 23 21   BUN  --  22 23 24   CR  --  1.19 1.09 1.01   ANIONGAP  --  5 7 10   KEV  --  8.4* 7.6* 7.9*   GLC  --  99 86 76   ALBUMIN  --   --  2.3* 2.7*   PROTTOTAL  --   --  5.9* 7.0   BILITOTAL  --   --  2.5* 1.6*   ALKPHOS  --   --  234* 286*   ALT  --   --  22 26   AST  --   --  53* 61*   LIPASE  --   --   --  369       Recent Results (from the past 24 hour(s))   US Lower Extremity Venous Duplex Left    Narrative    VENOUS ULTRASOUND LEFT LEG February 5, 2020 12:56 PM     HISTORY: Left leg swelling, greater than right leg swelling. Check for  deep vein thrombosis.    COMPARISON: 07/10/2019.    FINDINGS: Examination of the deep veins with graded compression and  color flow Doppler with spectral wave form analysis shows no evidence  of thrombus in the common femoral vein, femoral vein, popliteal vein  or calf veins.  There is no venous insufficiency.       Impression    IMPRESSION: No evidence of deep venous thrombosis.     PATRIA LEONG MD

## 2020-02-07 ENCOUNTER — APPOINTMENT (OUTPATIENT)
Dept: OCCUPATIONAL THERAPY | Facility: CLINIC | Age: 65
DRG: 433 | End: 2020-02-07
Attending: NURSE PRACTITIONER
Payer: MEDICARE

## 2020-02-07 ENCOUNTER — APPOINTMENT (OUTPATIENT)
Dept: PHYSICAL THERAPY | Facility: CLINIC | Age: 65
DRG: 433 | End: 2020-02-07
Attending: NURSE PRACTITIONER
Payer: MEDICARE

## 2020-02-07 LAB
ALBUMIN SERPL-MCNC: 2.2 G/DL (ref 3.4–5)
ALP SERPL-CCNC: 194 U/L (ref 40–150)
ALT SERPL W P-5'-P-CCNC: 19 U/L (ref 0–70)
ANION GAP SERPL CALCULATED.3IONS-SCNC: 7 MMOL/L (ref 3–14)
AST SERPL W P-5'-P-CCNC: 37 U/L (ref 0–45)
BILIRUB SERPL-MCNC: 1 MG/DL (ref 0.2–1.3)
BUN SERPL-MCNC: 24 MG/DL (ref 7–30)
CALCIUM SERPL-MCNC: 8.2 MG/DL (ref 8.5–10.1)
CHLORIDE SERPL-SCNC: 102 MMOL/L (ref 94–109)
CO2 SERPL-SCNC: 25 MMOL/L (ref 20–32)
CREAT SERPL-MCNC: 1.2 MG/DL (ref 0.66–1.25)
ERYTHROCYTE [DISTWIDTH] IN BLOOD BY AUTOMATED COUNT: 14.7 % (ref 10–15)
GFR SERPL CREATININE-BSD FRML MDRD: 63 ML/MIN/{1.73_M2}
GLUCOSE SERPL-MCNC: 90 MG/DL (ref 70–99)
HCT VFR BLD AUTO: 24.7 % (ref 40–53)
HGB BLD-MCNC: 8.2 G/DL (ref 13.3–17.7)
INR PPP: 1.36 (ref 0.86–1.14)
MAGNESIUM SERPL-MCNC: 1.7 MG/DL (ref 1.6–2.3)
MCH RBC QN AUTO: 32.2 PG (ref 26.5–33)
MCHC RBC AUTO-ENTMCNC: 33.2 G/DL (ref 31.5–36.5)
MCV RBC AUTO: 97 FL (ref 78–100)
PLATELET # BLD AUTO: 58 10E9/L (ref 150–450)
POTASSIUM SERPL-SCNC: 3.2 MMOL/L (ref 3.4–5.3)
POTASSIUM SERPL-SCNC: 3.8 MMOL/L (ref 3.4–5.3)
PROT SERPL-MCNC: 5.6 G/DL (ref 6.8–8.8)
RBC # BLD AUTO: 2.55 10E12/L (ref 4.4–5.9)
SODIUM SERPL-SCNC: 134 MMOL/L (ref 133–144)
WBC # BLD AUTO: 3.4 10E9/L (ref 4–11)

## 2020-02-07 PROCEDURE — 97165 OT EVAL LOW COMPLEX 30 MIN: CPT | Mod: GO | Performed by: OCCUPATIONAL THERAPIST

## 2020-02-07 PROCEDURE — 97116 GAIT TRAINING THERAPY: CPT | Mod: GP

## 2020-02-07 PROCEDURE — 12000000 ZZH R&B MED SURG/OB

## 2020-02-07 PROCEDURE — 83735 ASSAY OF MAGNESIUM: CPT | Performed by: INTERNAL MEDICINE

## 2020-02-07 PROCEDURE — 97161 PT EVAL LOW COMPLEX 20 MIN: CPT | Mod: GP

## 2020-02-07 PROCEDURE — 25000128 H RX IP 250 OP 636: Performed by: INTERNAL MEDICINE

## 2020-02-07 PROCEDURE — 85027 COMPLETE CBC AUTOMATED: CPT | Performed by: INTERNAL MEDICINE

## 2020-02-07 PROCEDURE — 25000132 ZZH RX MED GY IP 250 OP 250 PS 637: Mod: GY | Performed by: PSYCHIATRY & NEUROLOGY

## 2020-02-07 PROCEDURE — 25000132 ZZH RX MED GY IP 250 OP 250 PS 637: Mod: GY | Performed by: HOSPITALIST

## 2020-02-07 PROCEDURE — 25000132 ZZH RX MED GY IP 250 OP 250 PS 637: Mod: GY | Performed by: INTERNAL MEDICINE

## 2020-02-07 PROCEDURE — 25000132 ZZH RX MED GY IP 250 OP 250 PS 637: Mod: GY | Performed by: PHYSICIAN ASSISTANT

## 2020-02-07 PROCEDURE — 97535 SELF CARE MNGMENT TRAINING: CPT | Mod: GO | Performed by: OCCUPATIONAL THERAPIST

## 2020-02-07 PROCEDURE — 80053 COMPREHEN METABOLIC PANEL: CPT | Performed by: INTERNAL MEDICINE

## 2020-02-07 PROCEDURE — 99232 SBSQ HOSP IP/OBS MODERATE 35: CPT | Performed by: INTERNAL MEDICINE

## 2020-02-07 PROCEDURE — 85610 PROTHROMBIN TIME: CPT | Performed by: INTERNAL MEDICINE

## 2020-02-07 PROCEDURE — 97530 THERAPEUTIC ACTIVITIES: CPT | Mod: GP

## 2020-02-07 PROCEDURE — 36415 COLL VENOUS BLD VENIPUNCTURE: CPT | Performed by: INTERNAL MEDICINE

## 2020-02-07 PROCEDURE — 84132 ASSAY OF SERUM POTASSIUM: CPT | Performed by: INTERNAL MEDICINE

## 2020-02-07 RX ORDER — POTASSIUM CHLORIDE 7.45 MG/ML
10 INJECTION INTRAVENOUS
Status: DISCONTINUED | OUTPATIENT
Start: 2020-02-07 | End: 2020-02-25

## 2020-02-07 RX ORDER — POTASSIUM CL/LIDO/0.9 % NACL 10MEQ/0.1L
10 INTRAVENOUS SOLUTION, PIGGYBACK (ML) INTRAVENOUS
Status: DISCONTINUED | OUTPATIENT
Start: 2020-02-07 | End: 2020-02-25

## 2020-02-07 RX ORDER — POTASSIUM CHLORIDE 1500 MG/1
20-40 TABLET, EXTENDED RELEASE ORAL
Status: DISCONTINUED | OUTPATIENT
Start: 2020-02-07 | End: 2020-02-25

## 2020-02-07 RX ORDER — POTASSIUM CHLORIDE 29.8 MG/ML
20 INJECTION INTRAVENOUS
Status: DISCONTINUED | OUTPATIENT
Start: 2020-02-07 | End: 2020-02-25

## 2020-02-07 RX ORDER — POTASSIUM CHLORIDE 1.5 G/1.58G
20-40 POWDER, FOR SOLUTION ORAL
Status: DISCONTINUED | OUTPATIENT
Start: 2020-02-07 | End: 2020-02-25

## 2020-02-07 RX ORDER — LACTULOSE 10 G/15ML
10 SOLUTION ORAL DAILY
Status: DISCONTINUED | OUTPATIENT
Start: 2020-02-07 | End: 2020-02-09

## 2020-02-07 RX ADMIN — FOLIC ACID 1 MG: 1 TABLET ORAL at 08:15

## 2020-02-07 RX ADMIN — DULOXETINE HYDROCHLORIDE 30 MG: 30 CAPSULE, DELAYED RELEASE ORAL at 08:14

## 2020-02-07 RX ADMIN — PANTOPRAZOLE SODIUM 40 MG: 40 TABLET, DELAYED RELEASE ORAL at 06:41

## 2020-02-07 RX ADMIN — FUROSEMIDE 40 MG: 40 TABLET ORAL at 08:15

## 2020-02-07 RX ADMIN — MULTIPLE VITAMINS W/ MINERALS TAB 1 TABLET: TAB at 08:14

## 2020-02-07 RX ADMIN — POTASSIUM CHLORIDE 40 MEQ: 1500 TABLET, EXTENDED RELEASE ORAL at 11:48

## 2020-02-07 RX ADMIN — FLUTICASONE FUROATE AND VILANTEROL TRIFENATATE 1 PUFF: 200; 25 POWDER RESPIRATORY (INHALATION) at 08:20

## 2020-02-07 RX ADMIN — LACTULOSE 10 G: 10 SOLUTION ORAL at 12:43

## 2020-02-07 RX ADMIN — SPIRONOLACTONE 100 MG: 25 TABLET ORAL at 08:14

## 2020-02-07 RX ADMIN — PROPRANOLOL HYDROCHLORIDE 10 MG: 10 TABLET ORAL at 21:04

## 2020-02-07 RX ADMIN — MIRTAZAPINE 15 MG: 15 TABLET, FILM COATED ORAL at 21:28

## 2020-02-07 RX ADMIN — DICYCLOMINE HYDROCHLORIDE 20 MG: 20 TABLET ORAL at 21:28

## 2020-02-07 RX ADMIN — LORAZEPAM 1 MG: 2 INJECTION INTRAMUSCULAR; INTRAVENOUS at 04:52

## 2020-02-07 RX ADMIN — THIAMINE HCL TAB 100 MG 100 MG: 100 TAB at 08:14

## 2020-02-07 RX ADMIN — TRAZODONE HYDROCHLORIDE 200 MG: 50 TABLET ORAL at 21:28

## 2020-02-07 RX ADMIN — POTASSIUM CHLORIDE 20 MEQ: 1500 TABLET, EXTENDED RELEASE ORAL at 14:10

## 2020-02-07 RX ADMIN — NICOTINE 1 PATCH: 14 PATCH, EXTENDED RELEASE TRANSDERMAL at 08:15

## 2020-02-07 RX ADMIN — DICYCLOMINE HYDROCHLORIDE 20 MG: 20 TABLET ORAL at 11:48

## 2020-02-07 RX ADMIN — DICYCLOMINE HYDROCHLORIDE 20 MG: 20 TABLET ORAL at 06:41

## 2020-02-07 ASSESSMENT — ACTIVITIES OF DAILY LIVING (ADL)
ADLS_ACUITY_SCORE: 18
ADLS_ACUITY_SCORE: 19
ADLS_ACUITY_SCORE: 19
PREVIOUS_RESPONSIBILITIES: MEAL PREP;HOUSEKEEPING;LAUNDRY;MEDICATION MANAGEMENT;FINANCES;DRIVING
ADLS_ACUITY_SCORE: 18
ADLS_ACUITY_SCORE: 19
ADLS_ACUITY_SCORE: 18

## 2020-02-07 NOTE — PLAN OF CARE
A/Ox4. Lung sounds are clear. Abd distended and firm. CIWA 5, scheduled ativan given. Pt ambulated x1 with staff. Up with assist x2 using gait belt and walker.

## 2020-02-07 NOTE — PROGRESS NOTES
[facility]    Hospitalist Progress Note      Assessment & Plan   Jim Richard is a 64 year old male who was admitted on 2/3/2020. His history is significant for cirrhosis (w/ ascites, esophageal varices), EtOH use disorder, depression/anxiety, COPD, sleep apnea, anemia, admitted for EtOH intoxication and SI.     He was recently hospitalized at [facility] for weakness in EtOH withdrawal, and was seen by GI, CD, and Psych. It appears the initial plan was for him to go to TCU w/ close GI f/u to adjust his diuretics, but he refused TCU and went home. Since going home he has been drinking, medication compliance sporadic, with worsening abdominal ascites. He came in for SI in the setting of drinking.     # Alcohol Withdrawal: has had 4 alcohol-related admission in the past year  CIWA score 4 this morning, up to 7 in past 24 hours  - re-consult Chemical Dependence team, patient is more awake and conversational today  - cont CIWA protocol  - cont scheduled Ativan 1mg IVP Q8h thru 2/7  - cont daily thiamine, folate, MVI  - PT/OT eval    # Alcoholic Cirrhosis with Esophageal Varices  - appreciate Gastroenterology assistance  - cont Spironolactone 100mg PO daily  - cont Lasix 40mg PO daily  - cont Propranolol 10mg PO BID  - cont Lactulose 10g PO daily     # Anemia with hx of Esophageal Varices: Hgb 8.2 today, stable  - appreciate GI assistance  - monitor Hgb    # Diarrhea: no reported episodes over 24 hours  - appreciate GI assistance  - will cont to monitor  - may need colonoscopy if diarrhea continues     # Depression with Suicidal Ideation: reported SI prior to hospitalization although no further SI noted after arrival  - appreciate Psychiatry assistance  - cont Duloxetine, Mirtazapine, Trazodone, prn Seroquel     # FEN  - low Na diet     # Dispo  - will need re-evaluation by CD team  - plan for inpatient alcohol rehab when medically stable     DVT Prophylaxis: Pneumatic Compression Devices  Code  Status: DNR/DNI  Expected discharge: 4 - 7 days, recommended to alcohol rehab once medically stable.    Bang Orellana MD  Text page (7am - 6pm)    Interval History   - tolerated breakfast this morning, denies abd pain or n/v  - has some hand shakiness, otherwise feels okay  - denies hx of seizures  - 10+ ROS otherwise negative    -Data reviewed today: I reviewed all new labs and imaging results over the last 24 hours.    Physical Exam   Temp: 98.5  F (36.9  C) Temp src: Oral BP: 99/55   Heart Rate: 57 Resp: 16 SpO2: 95 % O2 Device: None (Room air) Oxygen Delivery: 2 LPM  Vitals:    02/03/20 0800   Weight: 89.6 kg (197 lb 8.5 oz)     Vital Signs with Ranges  Temp:  [98  F (36.7  C)-98.6  F (37  C)] 98.5  F (36.9  C)  Heart Rate:  [57-78] 57  Resp:  [16] 16  BP: ()/(55-77) 99/55  SpO2:  [92 %-96 %] 95 %  I/O last 3 completed shifts:  In: 240 [P.O.:240]  Out: 400 [Urine:400]    Constitutional: Awake, alert, cooperative, no apparent distress, A&Ox3  Respiratory: Clear to auscultation bilaterally, no crackles or wheezing.  Cardiovascular: Regular rate and rhythm, normal S1 and S2, and no murmur noted.  GI: Soft, non-distended, non-tender, normal bowel sounds.  Skin: No rashes, no cyanosis, no edema.  Musculoskeletal: bilateral hand tremors noted  Neurologic: Cranial nerves 2-12 intact, normal strength and sensation.  Psychiatric: Alert, oriented to person, place and time, no obvious anxiety or depression.    Medications       dicyclomine  20 mg Oral 4x Daily AC & HS     DULoxetine  30 mg Oral Daily     fluticasone-vilanterol  1 puff Inhalation Daily     folic acid  1 mg Oral Daily     furosemide  40 mg Oral Daily     mirtazapine  15 mg Oral At Bedtime     multivitamin w/minerals  1 tablet Oral Daily     nicotine  1 patch Transdermal Daily     nicotine   Transdermal Q8H     pantoprazole  40 mg Oral QAM AC     propranolol  10 mg Oral BID     spironolactone  100 mg Oral Daily     traZODone  200 mg Oral At Bedtime      vitamin B1  100 mg Oral Daily       Data   Recent Labs   Lab 02/06/20  1413 02/06/20  0946 02/06/20  0805 02/05/20  1148 02/04/20  0800 02/03/20  0218   WBC  --   --  3.9* 4.1  --  7.9   HGB 8.6*  --  8.4* 9.0*  --  9.6*   MCV  --   --  97 96  --  97   PLT  --   --  49* 58*  --  153   INR  --  1.29*  --   --   --  1.18*   NA  --   --   --  136 134 139   POTASSIUM  --   --   --  3.6 3.6 3.6   CHLORIDE  --   --   --  104 104 108   CO2  --   --   --  27 23 21   BUN  --   --   --  22 23 24   CR  --   --   --  1.19 1.09 1.01   ANIONGAP  --   --   --  5 7 10   KEV  --   --   --  8.4* 7.6* 7.9*   GLC  --   --   --  99 86 76   ALBUMIN  --   --   --   --  2.3* 2.7*   PROTTOTAL  --   --   --   --  5.9* 7.0   BILITOTAL  --   --   --   --  2.5* 1.6*   ALKPHOS  --   --   --   --  234* 286*   ALT  --   --   --   --  22 26   AST  --   --   --   --  53* 61*   LIPASE  --   --   --   --   --  369       No results found for this or any previous visit (from the past 24 hour(s)).

## 2020-02-07 NOTE — PROGRESS NOTES
"   02/07/20 1106   Quick Adds   Type of Visit Initial PT Evaluation   Living Environment   Lives With friend(s);other (see comments)  (roommate)   Living Arrangements house  (townhouse)   Home Accessibility stairs to enter home   Number of Stairs, Main Entrance 6   Stair Railings, Main Entrance railings on both sides of stairs   Number of Stairs, Within Home, Primary   (split level 6 up or down)   Stair Railings, Within Home, Primary railings on both sides of stairs   Living Environment Comment All needs met on upper level. Bathroom includes tub shower with grab bars, standard toilet.   Self-Care   Usual Activity Tolerance good   Current Activity Tolerance fair   Regular Exercise No   Equipment Currently Used at Home walker, rolling  (sometimes uses walker)   Functional Level Prior   Ambulation 0-->independent  (occaisionally walker \"when I feel wobbly\", usually no AD)   Transferring 0-->independent   Toileting 0-->independent   Bathing 1-->assistive equipment  (grab bars)   Fall history within last six months yes   Number of times patient has fallen within last six months 6  (loses balance, falls have occured when not using walker)   General Information   Onset of Illness/Injury or Date of Surgery - Date 02/04/20   Referring Physician Gloria Herzog APRN CNP   Patient/Family Goals Statement to get walking & walk around nurses station; ultimately to regain mobility & balance here and at TCU in order to go to residential CD treatment   Pertinent History of Current Problem (include personal factors and/or comorbidities that impact the POC) Pt is 64 y.o. M admitted 2/4 with alcohol withdrawal, also with alcoholic liver with ascites & esophageal varices. Patient with recent admission early-mid Jan 2020 with alcohol withdrawal; TCU was recommended at that time- pt refused &  discharged home.   Precautions/Limitations fall precautions   General Info Comments Activity: Ambulate with assist   Cognitive Status " Examination   Orientation orientation to person, place and time   Level of Consciousness alert   Follows Commands and Answers Questions 100% of the time;able to follow single-step instructions   Personal Safety and Judgment impaired   Pain Assessment   Patient Currently in Pain Yes, see Vital Sign flowsheet  (6/10 back pain & leg pain)   Posture    Posture Forward head position;Protracted shoulders   Range of Motion (ROM)   ROM Comment LE ROM WFL with bed mobility & transfers   Strength   Strength Comments Strength not formally assessed. Pt demos decreased LE strength with bed mobility & transfers; able to lift each LE minimally off bed wih some difficulty, tremors noted   Bed Mobility   Bed Mobility Comments supine>sit with SBA and HOB elevated   Transfer Skills   Transfer Comments Sit>stand with FWW and CGA, unsteady   Gait   Gait Comments Ambulated to bathroom with FWW and CGA with WBOS, slow pace, short step length, minimal clearance- unsteady.    Balance   Balance Comments Impaired balance with FWW support - needs CGA for safety with dynamic balance using walker   Sensory Examination   Sensory Perception Comments Reports baseline numbness/tingling R great toe   Modality Interventions   Planned Modality Interventions Cryotherapy   General Therapy Interventions   Planned Therapy Interventions balance training;bed mobility training;gait training;neuromuscular re-education;strengthening;transfer training;home program guidelines;progressive activity/exercise   Clinical Impression   Criteria for Skilled Therapeutic Intervention yes, treatment indicated   PT Diagnosis difficulty ambulating   Influenced by the following impairments impaired balance, decreased activity tolerance, pain, decreased strength   Functional limitations due to impairments difficulty with bed mobility, transfers, ambulation, stairs & self care   Clinical Presentation Stable/Uncomplicated   Clinical Presentation Rationale clinical judgement  "  Clinical Decision Making (Complexity) Low complexity   Therapy Frequency 5x/week   Predicted Duration of Therapy Intervention (days/wks) 5 days   Anticipated Discharge Disposition Transitional Care Facility   Risk & Benefits of therapy have been explained Yes   Patient, Family & other staff in agreement with plan of care Yes   Clinical Impression Comments Patient will benefit from continued skilled therapies in TCU to progress safety and independence with mobility prior to returning to community setting or beginning residential CD treatment.   [facility] AM-PAC  \"6 Clicks\" V.2 Basic Mobility Inpatient Short Form   1. Turning from your back to your side while in a flat bed without using bedrails? 3 - A Little   2. Moving from lying on your back to sitting on the side of a flat bed without using bedrails? 3 - A Little   3. Moving to and from a bed to a chair (including a wheelchair)? 3 - A Little   4. Standing up from a chair using your arms (e.g., wheelchair, or bedside chair)? 3 - A Little   5. To walk in hospital room? 3 - A Little   6. Climbing 3-5 steps with a railing? 2 - A Lot   Basic Mobility Raw Score (Score out of 24.Lower scores equate to lower levels of function) 17   Total Evaluation Time   Total Evaluation Time (Minutes) 12     "

## 2020-02-07 NOTE — PLAN OF CARE
Cognitive Concerns/ Orientation : A&Ox4; slow yet logical speech   BEHAVIOR & AGGRESSION TOOL COLOR: green  CIWA SCORE: 4,4  ABNL VS/O2: VSS on 2L, RA during the day.   MOBILITY: Ax2 with gb and walker, unsteady gait   PAIN MANAGMENT: denies  DIET: 2g Na   BOWEL/BLADDER: Continent on shift, no BM. Calls appropriately to ambulate to restroom  ABNL LAB/BG: K 3.2, replaced, recheck at 1610 and AM. Hemoglobin 8.2; stable, Platelets 58, Mg 1.7, INR 1.36   DRAIN/DEVICES: PIV SL  TELEMETRY RHYTHM: na  SKIN: Skin tear to R arm. Jenifer-area and coccyx with blanchable redness   TESTS/PROCEDURES: na  D/C DAY/GOALS/PLACE: 4-7 days, recommended to alcohol rehab facility once medically stable   OTHER IMPORTANT INFO: abdomen rounded/distended.

## 2020-02-07 NOTE — PROGRESS NOTES
DATE & TIME: 2/7/2020 7808-1080    Cognitive Concerns/ Orientation : A&Ox4; slow yet logical speech   BEHAVIOR & AGGRESSION TOOL COLOR: green  CIWA SCORE: 4,4  ABNL VS/O2: VSS on 2L, RA during the day.   MOBILITY: Ax2 with gb and walker, unsteady gait   PAIN MANAGMENT: denied on shift   DIET: 2g Na   BOWEL/BLADDER: Continent on shift, no BM. Calls appropriately to ambulate to restroom  ABNL LAB/BG: Hemoglobin 8.6 Platelets 49   DRAIN/DEVICES: PIV SL  TELEMETRY RHYTHM: na  SKIN: Skin tear to R arm, dried blood on dressing, no new bleeding present. Jenifer-area and coccyx with blanchable redness   TESTS/PROCEDURES: na  D/C DAY/GOALS/PLACE: 4-7 days, recommended to alcohol rehab facility once medically stable   OTHER IMPORTANT INFO: abdomen rounded/distended. Scheduled ativan given.

## 2020-02-07 NOTE — PLAN OF CARE
Discharge Planner PT   Patient plan for discharge: TCU, then residential CD treatment  Current status: PT orders received, eval completed, treatment initiated. Pt is 64 y.o. M admitted 2/4 with alcohol withdrawal, also with alcoholic liver with ascites & esophageal varices. Patient lives with roommate in [facility] with 6 steps to enter & 6 steps to main level. Pt reports he typically ambulates without AD but sometimes uses FWW when feeling unsteady, states he has had 6 falls in past 6 months & falls have been when not using walker.    Currently, pt received supine in bed, agreeable to PT. Pt with 2L O2 flowing through NC at start of session but NC out of nostrils. Decreased to RA with SpO2>90 throughout session. Performed supine>sit with SBA and HOB elevated. Sit>stand with FWW and CGA, unsteady. Sit<>stand at toilet with B grab bars and CGA. Needs assist to manage pull up & pericares. Ambulated 1x15', 1x140' with FWW and CGA- unsteady with WBOS, short shuffling steps. Slight improvement in step length with cues. Pt seated in chair upon departure, alarm on, all needs in reach.    Barriers to return to prior living situation: current level of assist, high falls risk  Recommendations for discharge: TCU  Rationale for recommendations: Patient will benefit from continued skilled therapies in TCU to progress safety and independence with mobility prior to returning to community setting or beginning residential CD treatment.       Entered by: Monica Mcguire 02/07/2020 11:42 AM

## 2020-02-07 NOTE — PROGRESS NOTES
[facility]  Gastroenterology Progress Note     Jim Richard MRN# [id number]   YOB: 1955 Age: 64 year old          Assessment and Plan:    Update: Patient had stable hemoglobin and platelets.  Hbdomen soft. No stools this a.m. NO report of melena.      Active Problems:   Alcohol withdrawal (H)  Alcohol liver cirrhosis with ascites and esophageal varices  Jim Richard is a pleasant 64 year old male with history of alcohol abuse, alcoholic liver cirrhosis with ascites and esophageal varices with diarrhea for 3 days. GI consulted for continuation of care and history of cirrhosis. Patient has had exacerbation of symptoms associated with excessive alcohol intake. Underwent ultrasound guided paracentesis and had 6750 mL of serous fluid removed. Hemoglobin stable and at baseline. Has been noncompliant with medication. Has been started on propranolol with history of esophageal varices. Has had lactulose held with diarrhea and stools incontinence and diarrhea. Having 1-2 stools a day and now more formed. Has had diuretics- creat 1.20. Does complain of cramping and bloating with no sharp or localized abdominal pain. Likely IBS- dicyclomine has helped.     - Recommend to consider inpatient alcohol treatment given 4 admissions in last year for alcohol related problems  - Continue with spironolactone 100 mg and furosemide 40 mg daily  - May restart lactulose if stools less than 3 a day.- will start with one tablespoon a day 10g/15mL  - Continue with pantoprazole and propranolol  - Continue on dicyclomine for abdominal cramping               Interval History:   no new complaints, doing well, denies chest pain, denies shortness of breath, denies abdominal pain, alert, oriented to person, place and time, has had a bowel movement in the last 24 hours and doing well; no cp, sob, n/v/d, or abd pain.              Review of Systems:   C: NEGATIVE for fever, chills, change in weight  E/M: NEGATIVE  for ear, mouth and throat problems  R: NEGATIVE for significant cough or SOB  CV: NEGATIVE for chest pain, palpitations or peripheral edema             Medications:   I have reviewed this patient's current medications    dicyclomine  20 mg Oral 4x Daily AC & HS     DULoxetine  30 mg Oral Daily     fluticasone-vilanterol  1 puff Inhalation Daily     folic acid  1 mg Oral Daily     furosemide  40 mg Oral Daily     mirtazapine  15 mg Oral At Bedtime     multivitamin w/minerals  1 tablet Oral Daily     nicotine  1 patch Transdermal Daily     nicotine   Transdermal Q8H     pantoprazole  40 mg Oral QAM AC     propranolol  10 mg Oral BID     spironolactone  100 mg Oral Daily     traZODone  200 mg Oral At Bedtime     vitamin B1  100 mg Oral Daily                  Physical Exam:   Vitals were reviewed  Vital Signs with Ranges  Temp:  [98  F (36.7  C)-98.6  F (37  C)] 98.5  F (36.9  C)  Heart Rate:  [57-78] 57  Resp:  [16] 16  BP: ()/(55-77) 99/55  SpO2:  [92 %-96 %] 95 %  I/O last 3 completed shifts:  In: 240 [P.O.:240]  Out: 400 [Urine:400]  Constitutional: alert, mild distress, cooperative and pale   Cardiovascular: negative, PMI normal. No lifts, heaves, or thrills. RRR. No murmurs, clicks gallops or rub  Respiratory: negative, Percussion normal. Good diaphragmatic excursion. Lungs clear  Head: Normocephalic. No masses, lesions, tenderness or abnormalities  Neck: Neck supple. No adenopathy. Thyroid symmetric, normal size,, Carotids without bruits.  Abdomen: Abdomen soft, non-tender. BS normal. No masses, organomegaly, positive findings: distended           Data:   I reviewed the patient's new clinical lab test results.   Recent Labs   Lab Test 02/07/20  0758 02/06/20  1413 02/06/20  0946 02/06/20  0805 02/05/20  1148 02/03/20  0218   WBC 3.4*  --   --  3.9* 4.1 7.9   HGB 8.2* 8.6*  --  8.4* 9.0* 9.6*   MCV 97  --   --  97 96 97   PLT 58*  --   --  49* 58* 153   INR 1.36*  --  1.29*  --   --  1.18*     Recent Labs    Lab Test 02/07/20  0758 02/05/20  1148 02/04/20  0800   POTASSIUM 3.2* 3.6 3.6   CHLORIDE 102 104 104   CO2 25 27 23   BUN 24 22 23   ANIONGAP 7 5 7     Recent Labs   Lab Test 02/07/20  0758 02/04/20  0800 02/03/20  0218  01/11/20  1730  11/13/19  1858 11/13/19  1849  02/13/19  2340  09/25/14  0510   ALBUMIN 2.2* 2.3* 2.7*   < >  --    < > 3.0*  --    < > 3.1*   < >  --    BILITOTAL 1.0 2.5* 1.6*   < >  --    < > 1.0  --    < > 0.4   < >  --    ALT 19 22 26   < >  --    < > 24  --    < > 41   < >  --    AST 37 53* 61*   < >  --    < > 58*  --    < > 89*   < >  --    PROTEIN  --   --   --   --  Negative  --   --  30*  --   --   --  Negative   LIPASE  --   --  369  --   --   --  499*  --   --  536*   < >  --     < > = values in this interval not displayed.       I reviewed the patient's new imaging results.    All laboratory data reviewed  All imaging studies reviewed by me.    Gloria De Leon PA-C,  2/7/2020  [facility]  Office : [phone]  Cell: [phone] (Dr. Shaw)  Cell: [phone] (Gloria De Leon PA-C)

## 2020-02-07 NOTE — PLAN OF CARE
Discharge Planner OT   Patient plan for discharge: Per chart--TCU, then residential CD treatment.  Current status: OT evaluation completed and treatment initiated. Pt. admitted w/ ETOH withdrawal + suicidal ideation. Patient lives with roommate in [facility] home. Pt. typically indep. W/ I/ADL's, recently hospitalized for ETOH withdrawal. Pt has had 6 falls in past 6 months, uses a WW on occasion.     Currently:Pt. A & O X 3, responded to safety/judgment questions accurately, 0/3 STM recall.Pt completed sit-stand w/ min. A, tremulous, vc's for safe WW use;pt ambulated approx. 10 feet to bathroom w/ CGA/WW, movoing slowly;completed toileting/clothing mgmt. w/ min. A + toilet transfer w/ CGA/grab bar;pt stood briefly at sink to wash hands/CGA, limited by weakness, tremors, impaired balance;pt. ambuated back to chair(10 feet) w/ CGA, stand-sit w/ CGA/vc's;pt. up in chair upon departure, alarm engaged.  Barriers to return to prior living situation: Current level of assist, weakness, impaired cognition/safety, falls risk  Recommendations for discharge: TCU  Rationale for recommendations: Pt. would benfit from continued skilled OT intervention to assist pt. to return to PLOF/achieve maximal safety/indep. W/ I/ADL's.       Entered by: Ena Barakat 02/07/2020 4:34 PM

## 2020-02-07 NOTE — PROGRESS NOTES
02/07/20 1200   Quick Adds   Type of Visit Initial Occupational Therapy Evaluation   Living Environment   Lives With friend(s)  (roommate)   Living Arrangements house  ([facility])   Home Accessibility stairs to enter home;stairs within home   Number of Stairs, Main Entrance 6   Stair Railings, Main Entrance railings on both sides of stairs   Stair Railings, Within Home, Primary railings on both sides of stairs   Living Environment Comment All needs met on upper level. Bathroom includes tub shower with grab bars, standard toilet/vanity support.   Self-Care   Usual Activity Tolerance good   Current Activity Tolerance fair   Regular Exercise No   Equipment Currently Used at Home walker, rolling;other (see comments)  (grab bars)   Activity/Exercise/Self-Care Comment Pt. reports he is indep. w/ I/ADl's. He lives w/ a friend/roommate who works during the day.   Functional Level   Ambulation 0-->independent  (has WW--uses occasionally)   Transferring 0-->independent   Toileting 0-->independent   Bathing 1-->assistive equipment  (grab bars)   Dressing 0-->independent   Eating 0-->independent   Communication 0-->understands/communicates without difficulty   Swallowing 0-->swallows foods/liquids without difficulty   Cognition 0 - no cognition issues reported   Fall history within last six months yes   Number of times patient has fallen within last six months 6   Which of the above functional risks had a recent onset or change? ambulation;transferring;toileting;bathing;dressing   General Information   Onset of Illness/Injury or Date of Surgery - Date 02/04/20   Referring Physician Gloria Herzog APRN CNP    Additional Occupational Profile Info/Pertinent History of Current Problem OT:Per chart--Jim Richard is a 64 year old male who was admitted on 2/3/2020. His history is significant for cirrhosis (w/ ascites, esophageal varices), EtOH use disorder, depression/anxiety, COPD, sleep apnea, anemia, admitted for EtOH  intoxication and SI.   Precautions/Limitations fall precautions   General Observations Pt. up in chair, agreeable to OT, A & O, tremulous   General Info Comments Pt. recently hospitalized for ETOH withdrawal.   Cognitive Status Examination   Orientation orientation to person, place and time   Level of Consciousness alert   Follows Commands (Cognition) WNL;WFL;follows one step commands   Memory impaired   Cognitive Comment safety/judgment questions=3/3;poor memory recall--will monitor, complete furhter testing as indicated.   Visual Perception   Visual Perception Comments pt. reports he hadLasik sx. lat year;no vision problems noted/reported   Sensory Examination   Sensory Comments no numbness/tingling reported   Pain Assessment   Patient Currently in Pain Yes, see Vital Sign flowsheet  (BLE's/shins)   Posture   Posture forward head position;protracted shoulders   Range of Motion (ROM)   ROM Comment BUE WNL   Strength   Strength Comments BUE WNL/WFL;gen.weakness   Hand Strength   Hand Strength Comments WNL   Coordination   Coordination Comments impaired;BUE--tremulous   Transfer Skill: Sit to Stand   Level of [place]: Sit/Stand minimum assist (75% patients effort)   Physical Assist/Nonphysical Assist: Sit/Stand verbal cues;1 person assist   Assistive Device for Transfer: Sit/Stand rolling walker   Transfer Skill: Toilet Transfer   Toilet Transfer Skill Comments standard toilet/vanity at home   Tub/Shower Transfer   Tub/Shower Transfer Comments tub/shower w/ grab bars/grab bars   Balance   Balance Comments impaired   Toileting   Level of [place]: Toilet contact guard   Physical Assist/Nonphysical Assist: Toilet verbal cues;1 person assist   Assistive Device grab bars   Instrumental Activities of Daily Living (IADL)   Previous Responsibilities meal prep;housekeeping;laundry;medication management;finances;driving   Activities of Daily Living Analysis   Impairments Contributing to Impaired Activities of Daily  "Living balance impaired;cognition impaired;pain;strength decreased   General Therapy Interventions   Planned Therapy Interventions ADL retraining;cognition;strengthening;progressive activity/exercise   Clinical Impression   Criteria for Skilled Therapeutic Interventions Met yes, treatment indicated   OT Diagnosis Decline in ADL performance   Influenced by the following impairments pain, weakness, impaired balance, impaired coordination   Assessment of Occupational Performance 3-5 Performance Deficits   Identified Performance Deficits Currently below baseline w/ dressing,bathing, standing ADL's/grooming, toileting, IADL's   Clinical Decision Making (Complexity) Low complexity   Therapy Frequency 5x/week   Predicted Duration of Therapy Intervention (days/wks) 3-5 days   Anticipated Equipment Needs at Discharge shower chair   Anticipated Discharge Disposition Transitional Care Facility   Risks and Benefits of Treatment have been explained. Yes   Patient, Family & other staff in agreement with plan of care Yes   [facility] TM \"6 Clicks\"   2016, Trustees of [facility], under license to [employer].  All rights reserved.   6 Clicks Short Forms Daily Activity Inpatient Short Form   [facility]  \"6 Clicks\" Daily Activity Inpatient Short Form   1. Putting on and taking off regular lower body clothing? 3 - A Little   2. Bathing (including washing, rinsing, drying)? 3 - A Little   3. Toileting, which includes using toilet, bedpan or urinal? 3 - A Little   4. Putting on and taking off regular upper body clothing? 3 - A Little   5. Taking care of personal grooming such as brushing teeth? 3 - A Little   6. Eating meals? 3 - A Little   Daily Activity Raw Score (Score out of 24.Lower scores equate to lower levels of function) 18   Total Evaluation Time   Total Evaluation Time (Minutes) 10     "

## 2020-02-07 NOTE — PLAN OF CARE
.DATE & TIME: 2/6 ([id number])    Cognitive Concerns/ Orientation : A&Ox4 (pt lethargic/sleeping all morning till around noon)   BEHAVIOR & AGGRESSION TOOL COLOR: Green  CIWA SCORE: 5, 6, & 5 (scheduled Ativan given x1 this shift, no PRN withdrawal medications given this shift)    ABNL VS/O2: VSS, RA, uses oxygen 2 liters when sleeping (JANIS)   MOBILITY: Up with 2 assist with gait belt/walker, ambulated with nursing to door and back, up in chair  PAIN MANAGMENT: Denies  DIET: 2 Gram NA  BOWEL/BLADDER: Mostly continent today, uses urinal, no BM this shift  ABNL LAB/BG: Hemoglobin 8.6, Lactic 0.5, MG 2.0, Platelets 49  DRAIN/DEVICES: IV SL  TELEMETRY RHYTHM: N/a  SKIN: Skin tear right arm (new Dressing placed this shift), bruising, linda-area/coccyx red (barrier cream and powder applied)  TESTS/PROCEDURES: N/a  D/C DAY/GOALS/PLACE: Pending  OTHER IMPORTANT INFO: Awaiting CD evaluation, pt calm, cooperative, and pleasant this shift.

## 2020-02-08 ENCOUNTER — APPOINTMENT (OUTPATIENT)
Dept: PHYSICAL THERAPY | Facility: CLINIC | Age: 65
DRG: 433 | End: 2020-02-08
Payer: MEDICARE

## 2020-02-08 ENCOUNTER — APPOINTMENT (OUTPATIENT)
Dept: OCCUPATIONAL THERAPY | Facility: CLINIC | Age: 65
DRG: 433 | End: 2020-02-08
Payer: MEDICARE

## 2020-02-08 LAB
ANION GAP SERPL CALCULATED.3IONS-SCNC: 2 MMOL/L (ref 3–14)
BACTERIA SPEC CULT: NO GROWTH
BUN SERPL-MCNC: 24 MG/DL (ref 7–30)
CALCIUM SERPL-MCNC: 8.4 MG/DL (ref 8.5–10.1)
CHLORIDE SERPL-SCNC: 105 MMOL/L (ref 94–109)
CO2 SERPL-SCNC: 29 MMOL/L (ref 20–32)
CREAT SERPL-MCNC: 1.26 MG/DL (ref 0.66–1.25)
ERYTHROCYTE [DISTWIDTH] IN BLOOD BY AUTOMATED COUNT: 15 % (ref 10–15)
GFR SERPL CREATININE-BSD FRML MDRD: 59 ML/MIN/{1.73_M2}
GLUCOSE SERPL-MCNC: 95 MG/DL (ref 70–99)
HCT VFR BLD AUTO: 26.6 % (ref 40–53)
HGB BLD-MCNC: 8.8 G/DL (ref 13.3–17.7)
MAGNESIUM SERPL-MCNC: 1.6 MG/DL (ref 1.6–2.3)
MCH RBC QN AUTO: 31.9 PG (ref 26.5–33)
MCHC RBC AUTO-ENTMCNC: 33.1 G/DL (ref 31.5–36.5)
MCV RBC AUTO: 96 FL (ref 78–100)
PLATELET # BLD AUTO: 67 10E9/L (ref 150–450)
POTASSIUM SERPL-SCNC: 3.8 MMOL/L (ref 3.4–5.3)
RBC # BLD AUTO: 2.76 10E12/L (ref 4.4–5.9)
SODIUM SERPL-SCNC: 136 MMOL/L (ref 133–144)
SPECIMEN SOURCE: NORMAL
WBC # BLD AUTO: 3.3 10E9/L (ref 4–11)

## 2020-02-08 PROCEDURE — 25000132 ZZH RX MED GY IP 250 OP 250 PS 637: Mod: GY | Performed by: HOSPITALIST

## 2020-02-08 PROCEDURE — 97116 GAIT TRAINING THERAPY: CPT | Mod: GP

## 2020-02-08 PROCEDURE — 25000132 ZZH RX MED GY IP 250 OP 250 PS 637: Mod: GY | Performed by: PHYSICIAN ASSISTANT

## 2020-02-08 PROCEDURE — 97530 THERAPEUTIC ACTIVITIES: CPT | Mod: GO | Performed by: OCCUPATIONAL THERAPY ASSISTANT

## 2020-02-08 PROCEDURE — 99232 SBSQ HOSP IP/OBS MODERATE 35: CPT | Performed by: INTERNAL MEDICINE

## 2020-02-08 PROCEDURE — 25000132 ZZH RX MED GY IP 250 OP 250 PS 637: Mod: GY | Performed by: INTERNAL MEDICINE

## 2020-02-08 PROCEDURE — 97535 SELF CARE MNGMENT TRAINING: CPT | Mod: GO | Performed by: OCCUPATIONAL THERAPY ASSISTANT

## 2020-02-08 PROCEDURE — 25000132 ZZH RX MED GY IP 250 OP 250 PS 637: Mod: GY | Performed by: PSYCHIATRY & NEUROLOGY

## 2020-02-08 PROCEDURE — 80048 BASIC METABOLIC PNL TOTAL CA: CPT | Performed by: INTERNAL MEDICINE

## 2020-02-08 PROCEDURE — 83735 ASSAY OF MAGNESIUM: CPT | Performed by: INTERNAL MEDICINE

## 2020-02-08 PROCEDURE — 12000000 ZZH R&B MED SURG/OB

## 2020-02-08 PROCEDURE — 85027 COMPLETE CBC AUTOMATED: CPT | Performed by: INTERNAL MEDICINE

## 2020-02-08 PROCEDURE — 36415 COLL VENOUS BLD VENIPUNCTURE: CPT | Performed by: INTERNAL MEDICINE

## 2020-02-08 RX ADMIN — DICYCLOMINE HYDROCHLORIDE 20 MG: 20 TABLET ORAL at 08:19

## 2020-02-08 RX ADMIN — SPIRONOLACTONE 100 MG: 25 TABLET ORAL at 08:19

## 2020-02-08 RX ADMIN — DEXTRAN 70 AND HYPROMELLOSE 2910 2 DROP: 1; 3 SOLUTION/ DROPS OPHTHALMIC at 15:03

## 2020-02-08 RX ADMIN — DICYCLOMINE HYDROCHLORIDE 20 MG: 20 TABLET ORAL at 21:26

## 2020-02-08 RX ADMIN — DICYCLOMINE HYDROCHLORIDE 20 MG: 20 TABLET ORAL at 11:44

## 2020-02-08 RX ADMIN — LACTULOSE 10 G: 10 SOLUTION ORAL at 08:20

## 2020-02-08 RX ADMIN — MULTIPLE VITAMINS W/ MINERALS TAB 1 TABLET: TAB at 08:19

## 2020-02-08 RX ADMIN — FOLIC ACID 1 MG: 1 TABLET ORAL at 08:19

## 2020-02-08 RX ADMIN — FUROSEMIDE 40 MG: 40 TABLET ORAL at 08:19

## 2020-02-08 RX ADMIN — NICOTINE 1 PATCH: 14 PATCH, EXTENDED RELEASE TRANSDERMAL at 08:20

## 2020-02-08 RX ADMIN — TRAZODONE HYDROCHLORIDE 200 MG: 50 TABLET ORAL at 21:26

## 2020-02-08 RX ADMIN — PROPRANOLOL HYDROCHLORIDE 10 MG: 10 TABLET ORAL at 08:19

## 2020-02-08 RX ADMIN — DICYCLOMINE HYDROCHLORIDE 20 MG: 20 TABLET ORAL at 17:07

## 2020-02-08 RX ADMIN — FLUTICASONE FUROATE AND VILANTEROL TRIFENATATE 1 PUFF: 200; 25 POWDER RESPIRATORY (INHALATION) at 08:20

## 2020-02-08 RX ADMIN — DULOXETINE HYDROCHLORIDE 30 MG: 30 CAPSULE, DELAYED RELEASE ORAL at 08:19

## 2020-02-08 RX ADMIN — PANTOPRAZOLE SODIUM 40 MG: 40 TABLET, DELAYED RELEASE ORAL at 08:19

## 2020-02-08 RX ADMIN — PROPRANOLOL HYDROCHLORIDE 10 MG: 10 TABLET ORAL at 21:26

## 2020-02-08 RX ADMIN — MIRTAZAPINE 15 MG: 15 TABLET, FILM COATED ORAL at 21:26

## 2020-02-08 ASSESSMENT — ACTIVITIES OF DAILY LIVING (ADL)
ADLS_ACUITY_SCORE: 19

## 2020-02-08 NOTE — PLAN OF CARE
Discharge Planner OT   Patient plan for discharge: Per chart--TCU, then residential CD treatment.  Current status: pt SBA supine to sit EOB, amb with FWW CGA to/from bathroom, toilet transfer Sarah as toilet is lower, Sarah  for clothing management to doff/don depends over  feet, CGA standing at sink for ADLS, pt fatigues with activity.   Barriers to return to prior living situation: Current level of assist, weakness, impaired cognition/safety, falls risk  Recommendations for discharge: TCU per plan established by the Occupational Therapist  Rationale for recommendations: Pt. would benfit from continued skilled OT intervention to assist pt. to return to PLOF/achieve maximal safety/indep. W/ I/ADL's.       Entered by: Krista Dolan 02/08/2020 1:01 PM

## 2020-02-08 NOTE — PROGRESS NOTES
[facility]    Hospitalist Progress Note      Assessment & Plan   Jim Richard is a 64 year old male who was admitted on 2/3/2020. His history is significant for cirrhosis (w/ ascites, esophageal varices), EtOH use disorder, depression/anxiety, COPD, sleep apnea, anemia, admitted for EtOH intoxication and SI.     He was recently hospitalized at [facility] for weakness in EtOH withdrawal, and was seen by GI, CD, and Psych. It appears the initial plan was for him to go to TCU w/ close GI f/u to adjust his diuretics, but he refused TCU and went home. Since going home he has been drinking, medication compliance sporadic, with worsening abdominal ascites. He came in for SI in the setting of drinking.     # Alcohol Withdrawal: has had 4 alcohol-related admission in the past year  CIWA score 3-4 this morning, up to 5 in past 24 hours  - appreciate CD assistance: patient will need to go to TCU first, then set up outpatient rehab  - cont CIWA protocol  - cont daily thiamine, folate, MVI  - cont PT/OT: rec TCU     # Alcoholic Cirrhosis with Esophageal Varices  - appreciate Gastroenterology assistance  - cont Spironolactone 100mg PO daily  - cont Lasix 40mg PO daily  - cont Propranolol 10mg PO BID  - cont Lactulose 10g PO daily     # Anemia with hx of Esophageal Varices: Hgb 8.8 today, stable  - appreciate GI assistance  - monitor Hgb     # Diarrhea: 3 formed BMs yesterday  - appreciate GI assistance  - will cont to monitor     # Depression with Suicidal Ideation: reported SI prior to hospitalization although no further SI noted after arrival  - appreciate Psychiatry assistance  - cont Duloxetine, Mirtazapine, Trazodone, prn Seroquel     # FEN  - low Na diet     # Dispo  - plan to discharge to TCU when medically stable     DVT Prophylaxis: Pneumatic Compression Devices  Code Status: DNR/DNI  Expected discharge: 2 - 3 days, recommended to transitional care unit once medically stable and CIWA improved off  Ativan.     Bang Orellana MD  Text page (7am - 6pm)    Interval History   - tolerating diet, had 3 normal formed BMs yesterday  - denies f/c, CP, SOB, abd pain, n/v  - reports shakiness in both hands/arms  - 10+ ROS otherwise negative    -Data reviewed today: I reviewed all new labs and imaging results over the last 24 hours.    Physical Exam   Temp: 98.1  F (36.7  C) Temp src: Oral BP: 119/70 Pulse: 58 Heart Rate: 59 Resp: 16 SpO2: 96 % O2 Device: None (Room air)    Vitals:    02/03/20 0800   Weight: 89.6 kg (197 lb 8.5 oz)     Vital Signs with Ranges  Temp:  [98.1  F (36.7  C)-98.8  F (37.1  C)] 98.1  F (36.7  C)  Pulse:  [58-60] 58  Heart Rate:  [59-62] 59  Resp:  [16-17] 16  BP: (114-128)/(66-74) 119/70  SpO2:  [94 %-96 %] 96 %  I/O last 3 completed shifts:  In: 480 [P.O.:480]  Out: 100 [Urine:100]    Constitutional: Awake, alert, cooperative, no apparent distress.  Respiratory: Clear to auscultation bilaterally, no crackles or wheezing.  Cardiovascular: Regular rate and rhythm, normal S1 and S2, and no murmur noted.  GI: Soft, non-distended, non-tender, normal bowel sounds.  Skin: No rashes, no cyanosis, no edema.  Musculoskeletal: noted tremors in bilateral hands  Neurologic: Cranial nerves 2-12 intact, normal strength and sensation.  Psychiatric: Alert, oriented to person, place and time, no obvious anxiety or depression.    Medications       dicyclomine  20 mg Oral 4x Daily AC & HS     DULoxetine  30 mg Oral Daily     fluticasone-vilanterol  1 puff Inhalation Daily     folic acid  1 mg Oral Daily     furosemide  40 mg Oral Daily     lactulose  10 g Oral Daily     mirtazapine  15 mg Oral At Bedtime     multivitamin w/minerals  1 tablet Oral Daily     nicotine  1 patch Transdermal Daily     nicotine   Transdermal Q8H     pantoprazole  40 mg Oral QAM AC     propranolol  10 mg Oral BID     spironolactone  100 mg Oral Daily     traZODone  200 mg Oral At Bedtime       Data   Recent Labs   Lab 02/08/20  0704  02/07/20  1623 02/07/20  0758 02/06/20  1413 02/06/20  0946 02/06/20  0805 02/05/20  1148 02/04/20  0800 02/03/20  0218   WBC 3.3*  --  3.4*  --   --  3.9* 4.1  --  7.9   HGB 8.8*  --  8.2* 8.6*  --  8.4* 9.0*  --  9.6*   MCV 96  --  97  --   --  97 96  --  97   PLT 67*  --  58*  --   --  49* 58*  --  153   INR  --   --  1.36*  --  1.29*  --   --   --  1.18*     --  134  --   --   --  136 134 139   POTASSIUM 3.8 3.8 3.2*  --   --   --  3.6 3.6 3.6   CHLORIDE 105  --  102  --   --   --  104 104 108   CO2 29  --  25  --   --   --  27 23 21   BUN 24  --  24  --   --   --  22 23 24   CR 1.26*  --  1.20  --   --   --  1.19 1.09 1.01   ANIONGAP 2*  --  7  --   --   --  5 7 10   KEV 8.4*  --  8.2*  --   --   --  8.4* 7.6* 7.9*   GLC 95  --  90  --   --   --  99 86 76   ALBUMIN  --   --  2.2*  --   --   --   --  2.3* 2.7*   PROTTOTAL  --   --  5.6*  --   --   --   --  5.9* 7.0   BILITOTAL  --   --  1.0  --   --   --   --  2.5* 1.6*   ALKPHOS  --   --  194*  --   --   --   --  234* 286*   ALT  --   --  19  --   --   --   --  22 26   AST  --   --  37  --   --   --   --  53* 61*   LIPASE  --   --   --   --   --   --   --   --  369       No results found for this or any previous visit (from the past 24 hour(s)).

## 2020-02-08 NOTE — PLAN OF CARE
Cognitive Concerns/ Orientation : Alert and Oriented x4   BEHAVIOR & AGGRESSION TOOL COLOR: Green   CIWA SCORE: 3,3    ABNL VS/O2: VSS on RA  MOBILITY: A1 with GB and walker   PAIN MANAGMENT: Denies   DIET: 2gm Na   BOWEL/BLADDER: Continent   ABNL LAB/BG: K+ 3.8  DRAIN/DEVICES: PIV SL   TELEMETRY RHYTHM: NSR.   SKIN: Skin tear, bandaged on right arm, coccyx blanchable   TESTS/PROCEDURES: NA  D/C DAY/GOALS/PLACE: 4-7 days, alcohol rehab recommended once medically stable. See CD notes regarding this.    OTHER IMPORTANT INFO: Abdominal distended/rounded.    MD states, continue to monitor for withdrawal.  Will consider stopping CIWA's after off scheduled Ativan (none today).

## 2020-02-08 NOTE — PLAN OF CARE
DATE & TIME: 2/8/2020                    Cognitive Concerns/ Orientation : Alert and Oriented x4, slow to respond   BEHAVIOR & AGGRESSION TOOL COLOR: Green   CIWA SCORE: 4,4     ABNL VS/O2: VSS on RA  MOBILITY: A1 with GB and walker   PAIN MANAGMENT: Denies   DIET: 2gm Na   BOWEL/BLADDER: Continent   ABNL LAB/BG: K+ recheck at 3.8  DRAIN/DEVICES: PIV SL   TELEMETRY RHYTHM: NSR with BBB.   SKIN: Skin tear, bandaged on right arm, coccyx blanchable   TESTS/PROCEDURES: NA  D/C DAY/GOALS/PLACE: 4-7 days, alcohol rehab recommended one medically stable.    OTHER IMPORTANT INFO: Abdominal distention noted, no nausea or vomiting.  Paracentesis done a couple days ago/ 6750 ml removed.

## 2020-02-08 NOTE — CONSULTS
2/8/20 chem dep consult acknowledged.  Per EHR, patient is being recommended to TCU and at this time would not be appropriate for placement into substance use treatment.  Substance use evaluations are only valid for specific time and being it is unknown the length of stay at TCU facility, I would recommend patient schedule outpatient substance use evaluation following discharge from TCU facility to determine level of care recommendations.  It is recommended patient schedule outpatient evaluations directly with [facility] addiction services as he would be admitting to their IP tx program. Their scheduling number is [phone].

## 2020-02-08 NOTE — PLAN OF CARE
Discharge Planner PT   Patient plan for discharge: TCU, then residential CD treatment  Current status: Pt transferred STS with SBA. Gait training 400' with FWW and CGA progressing to SBA. Following gait pt returned to sitting, declined balance activities. Pt left with alarm on and needs in reach.   Barriers to return to prior living situation: current level of assist, high falls risk  Recommendations for discharge: TCU per plan established by the PT.  Rationale for recommendations: Patient will benefit from continued skilled therapies in TCU to progress safety and independence with mobility prior to returning to community setting or beginning residential CD treatment.       Entered by: Yun Kingsley 02/08/2020 2:43 PM

## 2020-02-09 LAB
ANION GAP SERPL CALCULATED.3IONS-SCNC: 7 MMOL/L (ref 3–14)
BUN SERPL-MCNC: 24 MG/DL (ref 7–30)
CALCIUM SERPL-MCNC: 8.7 MG/DL (ref 8.5–10.1)
CHLORIDE SERPL-SCNC: 100 MMOL/L (ref 94–109)
CO2 SERPL-SCNC: 26 MMOL/L (ref 20–32)
CREAT SERPL-MCNC: 1.38 MG/DL (ref 0.66–1.25)
GFR SERPL CREATININE-BSD FRML MDRD: 53 ML/MIN/{1.73_M2}
GLUCOSE SERPL-MCNC: 94 MG/DL (ref 70–99)
MAGNESIUM SERPL-MCNC: 1.7 MG/DL (ref 1.6–2.3)
POTASSIUM SERPL-SCNC: 3.7 MMOL/L (ref 3.4–5.3)
SODIUM SERPL-SCNC: 133 MMOL/L (ref 133–144)

## 2020-02-09 PROCEDURE — 12000000 ZZH R&B MED SURG/OB

## 2020-02-09 PROCEDURE — 99232 SBSQ HOSP IP/OBS MODERATE 35: CPT | Performed by: PSYCHIATRY & NEUROLOGY

## 2020-02-09 PROCEDURE — 25000132 ZZH RX MED GY IP 250 OP 250 PS 637: Mod: GY | Performed by: INTERNAL MEDICINE

## 2020-02-09 PROCEDURE — 80048 BASIC METABOLIC PNL TOTAL CA: CPT | Performed by: INTERNAL MEDICINE

## 2020-02-09 PROCEDURE — 25000132 ZZH RX MED GY IP 250 OP 250 PS 637: Mod: GY | Performed by: PHYSICIAN ASSISTANT

## 2020-02-09 PROCEDURE — 25000132 ZZH RX MED GY IP 250 OP 250 PS 637: Mod: GY | Performed by: PSYCHIATRY & NEUROLOGY

## 2020-02-09 PROCEDURE — 99232 SBSQ HOSP IP/OBS MODERATE 35: CPT | Performed by: INTERNAL MEDICINE

## 2020-02-09 PROCEDURE — 83735 ASSAY OF MAGNESIUM: CPT | Performed by: INTERNAL MEDICINE

## 2020-02-09 PROCEDURE — 36415 COLL VENOUS BLD VENIPUNCTURE: CPT | Performed by: INTERNAL MEDICINE

## 2020-02-09 PROCEDURE — 25000132 ZZH RX MED GY IP 250 OP 250 PS 637: Mod: GY | Performed by: HOSPITALIST

## 2020-02-09 RX ORDER — LACTULOSE 10 G/15ML
20 SOLUTION ORAL 2 TIMES DAILY
Status: DISCONTINUED | OUTPATIENT
Start: 2020-02-09 | End: 2020-02-11

## 2020-02-09 RX ADMIN — FOLIC ACID 1 MG: 1 TABLET ORAL at 08:09

## 2020-02-09 RX ADMIN — PANTOPRAZOLE SODIUM 40 MG: 40 TABLET, DELAYED RELEASE ORAL at 06:49

## 2020-02-09 RX ADMIN — PROPRANOLOL HYDROCHLORIDE 10 MG: 10 TABLET ORAL at 08:08

## 2020-02-09 RX ADMIN — DICYCLOMINE HYDROCHLORIDE 20 MG: 20 TABLET ORAL at 15:55

## 2020-02-09 RX ADMIN — FUROSEMIDE 40 MG: 40 TABLET ORAL at 08:09

## 2020-02-09 RX ADMIN — DULOXETINE HYDROCHLORIDE 30 MG: 30 CAPSULE, DELAYED RELEASE ORAL at 08:09

## 2020-02-09 RX ADMIN — DICYCLOMINE HYDROCHLORIDE 20 MG: 20 TABLET ORAL at 06:49

## 2020-02-09 RX ADMIN — DEXTRAN 70 AND HYPROMELLOSE 2910 2 DROP: 1; 3 SOLUTION/ DROPS OPHTHALMIC at 18:50

## 2020-02-09 RX ADMIN — DICYCLOMINE HYDROCHLORIDE 20 MG: 20 TABLET ORAL at 21:28

## 2020-02-09 RX ADMIN — DEXTRAN 70 AND HYPROMELLOSE 2910 2 DROP: 1; 3 SOLUTION/ DROPS OPHTHALMIC at 14:02

## 2020-02-09 RX ADMIN — NICOTINE 1 PATCH: 14 PATCH, EXTENDED RELEASE TRANSDERMAL at 08:09

## 2020-02-09 RX ADMIN — DEXTRAN 70 AND HYPROMELLOSE 2910 2 DROP: 1; 3 SOLUTION/ DROPS OPHTHALMIC at 15:55

## 2020-02-09 RX ADMIN — DEXTRAN 70 AND HYPROMELLOSE 2910 2 DROP: 1; 3 SOLUTION/ DROPS OPHTHALMIC at 08:07

## 2020-02-09 RX ADMIN — DEXTRAN 70 AND HYPROMELLOSE 2910 2 DROP: 1; 3 SOLUTION/ DROPS OPHTHALMIC at 11:59

## 2020-02-09 RX ADMIN — PROPRANOLOL HYDROCHLORIDE 10 MG: 10 TABLET ORAL at 21:28

## 2020-02-09 RX ADMIN — FLUTICASONE FUROATE AND VILANTEROL TRIFENATATE 1 PUFF: 200; 25 POWDER RESPIRATORY (INHALATION) at 08:07

## 2020-02-09 RX ADMIN — MIRTAZAPINE 15 MG: 15 TABLET, FILM COATED ORAL at 21:28

## 2020-02-09 RX ADMIN — LACTULOSE 10 G: 10 SOLUTION ORAL at 08:07

## 2020-02-09 RX ADMIN — DICYCLOMINE HYDROCHLORIDE 20 MG: 20 TABLET ORAL at 12:01

## 2020-02-09 RX ADMIN — SPIRONOLACTONE 100 MG: 25 TABLET ORAL at 08:08

## 2020-02-09 RX ADMIN — MULTIPLE VITAMINS W/ MINERALS TAB 1 TABLET: TAB at 08:08

## 2020-02-09 RX ADMIN — TRAZODONE HYDROCHLORIDE 200 MG: 50 TABLET ORAL at 21:28

## 2020-02-09 ASSESSMENT — ACTIVITIES OF DAILY LIVING (ADL)
ADLS_ACUITY_SCORE: 19
ADLS_ACUITY_SCORE: 20
ADLS_ACUITY_SCORE: 19
ADLS_ACUITY_SCORE: 20
ADLS_ACUITY_SCORE: 20
ADLS_ACUITY_SCORE: 19

## 2020-02-09 NOTE — PROGRESS NOTES
[facility]    Hospitalist Progress Note      Assessment & Plan   Jim Richard is a 64 year old male who was admitted on 2/3/2020. His history is significant for cirrhosis (w/ ascites, esophageal varices), EtOH use disorder, depression/anxiety, COPD, sleep apnea, anemia, admitted for EtOH intoxication and SI.    He was recently hospitalized at [facility] for weakness in EtOH withdrawal, and was seen by GI, CD, and Psych. It appears the initial plan was for him to go to TCU w/ close GI f/u to adjust his diuretics, but he refused TCU and went home. Since going home he has been drinking, medication compliance sporadic, with worsening abdominal ascites. He came in for SI in the setting of drinking.    On 2/9/2020 the patient's daughter called asking for an update. The patient gave permission for his daughter to be updated. I spoke with Aleksandra (daughter) at [phone]. Aleksandra is concerned about her father and poor self care. He admits to her that he doesn't take his medications. We discussed his liver disease and risk of confusion (encephalopathy) with non-compliance. She reports that his home is covered in alcohol bottles, the environment is filthy, there's fecal matter and urine stains everywhere. In the past he's been hospitalized, recommended for TCU, which he either declines or goes for a day then asks to be discharged home. There is a [facility]  (Michaela Moya at [phone]) who has been following his case as well.    # Alcohol Withdrawal: has had 4 alcohol-related admission in the past year  CIWA score consistently 3-4 for tremors and mild confusion. Has not required benzodiazepines in the past 48 hours.  - appreciate CD assistance: patient will need to go to TCU first, then set up outpatient rehab  - will re-consult Psych to eval for potential commitment given concerns for self-care  - will speak with SW/CM regarding issues brought up by patient's daughter today  -  discontinue CIWA protocol  - cont daily thiamine, folate, MVI  - cont PT/OT: rec TCU    # Alcoholic Cirrhosis with Esophageal Varices  - appreciate Gastroenterology assistance  - cont Spironolactone 100mg PO daily  - cont Lasix 40mg PO daily  - cont Propranolol 10mg PO BID  - increase Lactulose 20g PO BID, titrate to 2-3 BMs per day    # Anemia with hx of Esophageal Varices  - appreciate GI assistance    # Depression with Suicidal Ideation: reported SI prior to hospitalization although no further SI noted after arrival  - appreciate Psychiatry assistance  - cont Duloxetine, Mirtazapine, Trazodone, prn Seroquel    # FEN  - low Na diet    # Dispo  - re-consult Psych to eval for possible committment    DVT Prophylaxis: Pneumatic Compression Devices  Code Status: DNR/DNI  Expected discharge: 2 - 3 days, recommended to transitional care unit once medically stable. Will speak with SW/CM regarding information provided by patient's daughter today.    Bang Orellana MD  Text page (7am - 6pm)    Interval History   - feels well overall, tolerating diet  - had 1 BM yesterday  - 10+ ROS otherwise negative    -Data reviewed today: I reviewed all new labs and imaging results over the last 24 hours.    Physical Exam   Temp: 98.2  F (36.8  C) Temp src: Oral BP: 109/48 Pulse: 57 Heart Rate: 57 Resp: 18 SpO2: 95 % O2 Device: None (Room air)    Vitals:    02/03/20 0800   Weight: 89.6 kg (197 lb 8.5 oz)     Vital Signs with Ranges  Temp:  [98.1  F (36.7  C)-98.3  F (36.8  C)] 98.2  F (36.8  C)  Pulse:  [56-57] 57  Heart Rate:  [57-65] 57  Resp:  [16-18] 18  BP: (108-129)/(48-72) 109/48  SpO2:  [95 %-97 %] 95 %  I/O last 3 completed shifts:  In: 340 [P.O.:340]  Out: 740 [Urine:740]    Constitutional: Awake, alert, cooperative, no apparent distress.  Respiratory: Clear to auscultation bilaterally, no crackles or wheezing.  Cardiovascular: Regular rate and rhythm, normal S1 and S2, and no murmur noted.  GI: Soft, non-distended, non-tender,  normal bowel sounds.  Skin: No rashes, no cyanosis, no edema.  Musculoskeletal: noted tremors in bilateral hands  Neurologic: Cranial nerves 2-12 intact, normal strength and sensation.  Psychiatric: Alert, oriented to person, place and time, no obvious anxiety or depression.    Medications       dicyclomine  20 mg Oral 4x Daily AC & HS     DULoxetine  30 mg Oral Daily     fluticasone-vilanterol  1 puff Inhalation Daily     folic acid  1 mg Oral Daily     furosemide  40 mg Oral Daily     lactulose  20 g Oral BID     mirtazapine  15 mg Oral At Bedtime     multivitamin w/minerals  1 tablet Oral Daily     nicotine  1 patch Transdermal Daily     nicotine   Transdermal Q8H     pantoprazole  40 mg Oral QAM AC     propranolol  10 mg Oral BID     spironolactone  100 mg Oral Daily     traZODone  200 mg Oral At Bedtime       Data   Recent Labs   Lab 02/09/20  0810 02/08/20  0735 02/07/20  1623 02/07/20  0758 02/06/20  1413 02/06/20  0946 02/06/20  0805  02/04/20  0800 02/03/20  0218   WBC  --  3.3*  --  3.4*  --   --  3.9*   < >  --  7.9   HGB  --  8.8*  --  8.2* 8.6*  --  8.4*   < >  --  9.6*   MCV  --  96  --  97  --   --  97   < >  --  97   PLT  --  67*  --  58*  --   --  49*   < >  --  153   INR  --   --   --  1.36*  --  1.29*  --   --   --  1.18*    136  --  134  --   --   --    < > 134 139   POTASSIUM 3.7 3.8 3.8 3.2*  --   --   --    < > 3.6 3.6   CHLORIDE 100 105  --  102  --   --   --    < > 104 108   CO2 26 29  --  25  --   --   --    < > 23 21   BUN 24 24  --  24  --   --   --    < > 23 24   CR 1.38* 1.26*  --  1.20  --   --   --    < > 1.09 1.01   ANIONGAP 7 2*  --  7  --   --   --    < > 7 10   KEV 8.7 8.4*  --  8.2*  --   --   --    < > 7.6* 7.9*   GLC 94 95  --  90  --   --   --    < > 86 76   ALBUMIN  --   --   --  2.2*  --   --   --   --  2.3* 2.7*   PROTTOTAL  --   --   --  5.6*  --   --   --   --  5.9* 7.0   BILITOTAL  --   --   --  1.0  --   --   --   --  2.5* 1.6*   ALKPHOS  --   --   --  194*  --    --   --   --  234* 286*   ALT  --   --   --  19  --   --   --   --  22 26   AST  --   --   --  37  --   --   --   --  53* 61*   LIPASE  --   --   --   --   --   --   --   --   --  369    < > = values in this interval not displayed.       No results found for this or any previous visit (from the past 24 hour(s)).

## 2020-02-09 NOTE — PLAN OF CARE
Cognitive Concerns/ Orientation : Alert and Oriented x4   BEHAVIOR & AGGRESSION TOOL COLOR: Green   CIWA SCORE: 3.  discontinued   ABNL VS/O2: VSS on RA   MOBILITY: 1A with GB and Walker   PAIN MANAGMENT: Denies   DIET: 2 GM Na   BOWEL/BLADDER: Incontinent at times   ABNL LAB/BG: unremarkable today  DRAIN/DEVICES: PIV SL   TELEMETRY RHYTHM: SB with BBB  SKIN: Skin tear to right forearm, bruising. Dressing CD&I.    TESTS/PROCEDURES: NA   D/C DAY/GOALS/PLACE: Alcohol rehab is recommended.    OTHER IMPORTANT INFO:   Abdomen round and distended.   Eye drops for dry eyes.    Lactulose increased.  Large, loose, incontinent BM prior to given.  Discussed with MD. Dose held.

## 2020-02-09 NOTE — PROGRESS NOTES
Johny has no acute GI concerns. GI will sign off. If has decompensation of liver cirrhosis or other acute GI problems will be happy to see patient. We do recommend TCU. GI will see in outpatient clinic in 1-2 weeks post discharge.  Gloria De Leon PA-C  [facility]  [phone]

## 2020-02-09 NOTE — PROGRESS NOTES
"SPIRITUAL HEALTH SERVICES Progress Note  [facility]    Visit per LOS. Pt seemed glad for a visit and voiced \"that it was boring in the hospital\". SH affirmed feelings of too much waiting. Pt stated he was hoping to go to \"a Transitional home and then rehab\" once he was strong enough and then back home to his dog, Red. Pt discussed with SH how \"he doesn't like change\" and that \"he doesn't want to go to the other places because it's scary\" since he \"doesn't know what it will be like\". SH affirmed feelings of fear and provided emotional support. Pt also voiced that, \"even though he doesn't want to, he's following the Dr's recommendation this time\" so that he can be strong enough to go home and stay home. Pt noted that he \"used to be [employer], but then he got \", pt stated that the reason for the divorce was his drinking, and that it has lead to other complicated family dynamics.  offered emotional support for pt and encouraged following Dr's recommendations. Visit ended as lunch arrived.  remains available for any f/u.       Jaelyn Qiu   Intern  "

## 2020-02-09 NOTE — CONSULTS
"[facility] Psychiatric Consult Progress Note    Interval History:   Pt seen, chart reviewed, case discussed with nursing staff and treating clinicians. Reconsulted today to query pursuit of commitment given expressed concerns from his daughter of a pattern of refusing treatment and living in disarray. His current dispo plans are TCU, followed by CD tx. She notes in the past he'd either refuse TCU, or asked to be discharged within a day of arrival. She notes his home is in disarray: covered in EtOH bottles, filthy, fecal matter and urine stains. On my interview today he tells me he's in a much different headspace in comparison to the past. He knows of recs for TCU and tells me he recognizes he needs it: \"I'm still so weak.\" He also independently tells me he wants CD treatment after the TCU, and per the CD counselor there is a plan for him to connect with [facility] for inpatient tx after TCU. He further tells me he's open to discussion about his longer-term care and living needs, agreeing his home is in disarray. I attempted to contact his  with [facility] (Michaela [phone]), though was met with voicemail.     Review of systems:   10 point Review of Systems completed by Eros Kearney DO, and is  is negative other than noted in the HPI     Medications:       dicyclomine  20 mg Oral 4x Daily AC & HS     DULoxetine  30 mg Oral Daily     fluticasone-vilanterol  1 puff Inhalation Daily     folic acid  1 mg Oral Daily     furosemide  40 mg Oral Daily     lactulose  20 g Oral BID     mirtazapine  15 mg Oral At Bedtime     multivitamin w/minerals  1 tablet Oral Daily     nicotine  1 patch Transdermal Daily     nicotine   Transdermal Q8H     pantoprazole  40 mg Oral QAM AC     propranolol  10 mg Oral BID     spironolactone  100 mg Oral Daily     traZODone  200 mg Oral At Bedtime     acetaminophen, acetaminophen, albuterol, glucose **OR** dextrose **OR** glucagon, hypromellose-dextran, " magnesium sulfate, magnesium sulfate, melatonin, miconazole, naloxone, ondansetron **OR** ondansetron, oxyCODONE, potassium chloride, potassium chloride with lidocaine, potassium chloride, potassium chloride, potassium chloride, QUEtiapine, sodium chloride    Mental Status Examination:   Appearance:  awake, alert  Eye Contact:  good  Speech:  clear, coherent  Language:Normal  Psychomotor Behavior:  no evidence of tardive dyskinesia, dystonia, or tics  Mood:  good  Affect:  appropriate and in normal range  Thought Process:  logical, linear and goal oriented no loose associations  Thought Content:  no evidence of suicidal ideation or homicidal ideation and no evidence of psychotic thought  Oriented to:  time, person, and place  Attention Span and Concentration:  intact  Recent and Remote Memory:  intact  Fund of Knowledge: appropriate  Muscle Strength and Tone: normal  Gait and Station: Normal  Insight:  improving  Judgment:  improving        Labs/Vitals:     Recent Results (from the past 24 hour(s))   Basic metabolic panel    Collection Time: 02/09/20  8:10 AM   Result Value Ref Range    Sodium 133 133 - 144 mmol/L    Potassium 3.7 3.4 - 5.3 mmol/L    Chloride 100 94 - 109 mmol/L    Carbon Dioxide 26 20 - 32 mmol/L    Anion Gap 7 3 - 14 mmol/L    Glucose 94 70 - 99 mg/dL    Urea Nitrogen 24 7 - 30 mg/dL    Creatinine 1.38 (H) 0.66 - 1.25 mg/dL    GFR Estimate 53 (L) >60 mL/min/[1.73_m2]    GFR Estimate If Black 61 >60 mL/min/[1.73_m2]    Calcium 8.7 8.5 - 10.1 mg/dL   Magnesium    Collection Time: 02/09/20  8:10 AM   Result Value Ref Range    Magnesium 1.7 1.6 - 2.3 mg/dL     B/P: 109/48, T: 98.2, P: 57, R: 18      DIagnoses:   1.  Alcohol use disorder.    2.  Major depressive disorder, recurrent, moderate.  3.  Alcoholic liver disease with ascites  4.  Esophageal varices.  5.  JANIS.  6.  COPD.  7.  Tobacco use disorder.  8.  Anemia.         Plan:   I don't see indication to pursue commitment at this time as patient  "is fully agreeing to treatment recommendations, namely TCU followed by hopeful inpatient treatment through [facility]. Reports of not being agreeable in the past, or chaninig his mind shortly thereafter were broached, but at least to me today he's expressing much clearer insight into his care needs and the difficulties he's having. He independently tells me \"I'm so weak still\" as as an example of why he needs TCU, and independently expresses desire to partake in CD tx as he expresses recognition of the toll alcohol is taking on him. I do think it's worthwhile to touch base with his . I attempted to call today to no avail. If SW could convene with them to discuss these thoughts and further inquire about options to intervene on his unsanitary living conditions. Patient tells me today he's open to recs in this regard, to include living in a place with more supervision/support if need be.      Attestation:  Patient has been seen and evaluated by me,  Eros Kearney,     "

## 2020-02-09 NOTE — PLAN OF CARE
DATE & TIME: 2/9/2020                    Cognitive Concerns/ Orientation : Alert and Oriented x4   BEHAVIOR & AGGRESSION TOOL COLOR: Green   CIWA SCORE: 3,3     ABNL VS/O2: VSS on RA   MOBILITY: 1A with GB and Walker   PAIN MANAGMENT: Denies   DIET: 2 GM Na   BOWEL/BLADDER: Incontinent at times   ABNL LAB/BG: WBC 3.3  DRAIN/DEVICES: PIV SL   TELEMETRY RHYTHM: SB with BBB  SKIN: Skin tear to right forearm, bruising.  TESTS/PROCEDURES: NA   D/C DAY/GOALS/PLACE: Several day, Alcohol rehab is recommended.    OTHER IMPORTANT INFO: Abdomen round and distended.

## 2020-02-10 ENCOUNTER — APPOINTMENT (OUTPATIENT)
Dept: PHYSICAL THERAPY | Facility: CLINIC | Age: 65
DRG: 433 | End: 2020-02-10
Payer: MEDICARE

## 2020-02-10 ENCOUNTER — APPOINTMENT (OUTPATIENT)
Dept: OCCUPATIONAL THERAPY | Facility: CLINIC | Age: 65
DRG: 433 | End: 2020-02-10
Payer: MEDICARE

## 2020-02-10 LAB
ANION GAP SERPL CALCULATED.3IONS-SCNC: 6 MMOL/L (ref 3–14)
BUN SERPL-MCNC: 25 MG/DL (ref 7–30)
CALCIUM SERPL-MCNC: 8.6 MG/DL (ref 8.5–10.1)
CHLORIDE SERPL-SCNC: 102 MMOL/L (ref 94–109)
CO2 SERPL-SCNC: 26 MMOL/L (ref 20–32)
CREAT SERPL-MCNC: 1.38 MG/DL (ref 0.66–1.25)
GFR SERPL CREATININE-BSD FRML MDRD: 53 ML/MIN/{1.73_M2}
GLUCOSE SERPL-MCNC: 99 MG/DL (ref 70–99)
MAGNESIUM SERPL-MCNC: 1.7 MG/DL (ref 1.6–2.3)
POTASSIUM SERPL-SCNC: 3.7 MMOL/L (ref 3.4–5.3)
SODIUM SERPL-SCNC: 134 MMOL/L (ref 133–144)

## 2020-02-10 PROCEDURE — 97110 THERAPEUTIC EXERCISES: CPT | Mod: GP | Performed by: PHYSICAL THERAPIST

## 2020-02-10 PROCEDURE — 97116 GAIT TRAINING THERAPY: CPT | Mod: GP | Performed by: PHYSICAL THERAPIST

## 2020-02-10 PROCEDURE — 99232 SBSQ HOSP IP/OBS MODERATE 35: CPT | Performed by: INTERNAL MEDICINE

## 2020-02-10 PROCEDURE — 25000132 ZZH RX MED GY IP 250 OP 250 PS 637: Mod: GY | Performed by: PHYSICIAN ASSISTANT

## 2020-02-10 PROCEDURE — 25000132 ZZH RX MED GY IP 250 OP 250 PS 637: Mod: GY | Performed by: INTERNAL MEDICINE

## 2020-02-10 PROCEDURE — 83735 ASSAY OF MAGNESIUM: CPT | Performed by: INTERNAL MEDICINE

## 2020-02-10 PROCEDURE — 12000000 ZZH R&B MED SURG/OB

## 2020-02-10 PROCEDURE — 36415 COLL VENOUS BLD VENIPUNCTURE: CPT | Performed by: INTERNAL MEDICINE

## 2020-02-10 PROCEDURE — 25000132 ZZH RX MED GY IP 250 OP 250 PS 637: Mod: GY | Performed by: PSYCHIATRY & NEUROLOGY

## 2020-02-10 PROCEDURE — 97535 SELF CARE MNGMENT TRAINING: CPT | Mod: GO | Performed by: OCCUPATIONAL THERAPY ASSISTANT

## 2020-02-10 PROCEDURE — 80048 BASIC METABOLIC PNL TOTAL CA: CPT | Performed by: INTERNAL MEDICINE

## 2020-02-10 PROCEDURE — 97530 THERAPEUTIC ACTIVITIES: CPT | Mod: GO | Performed by: OCCUPATIONAL THERAPY ASSISTANT

## 2020-02-10 PROCEDURE — 25000132 ZZH RX MED GY IP 250 OP 250 PS 637: Mod: GY | Performed by: HOSPITALIST

## 2020-02-10 RX ADMIN — DEXTRAN 70 AND HYPROMELLOSE 2910 2 DROP: 1; 3 SOLUTION/ DROPS OPHTHALMIC at 16:38

## 2020-02-10 RX ADMIN — NICOTINE 1 PATCH: 14 PATCH, EXTENDED RELEASE TRANSDERMAL at 08:56

## 2020-02-10 RX ADMIN — DEXTRAN 70 AND HYPROMELLOSE 2910 2 DROP: 1; 3 SOLUTION/ DROPS OPHTHALMIC at 08:53

## 2020-02-10 RX ADMIN — DEXTRAN 70 AND HYPROMELLOSE 2910 2 DROP: 1; 3 SOLUTION/ DROPS OPHTHALMIC at 10:55

## 2020-02-10 RX ADMIN — FOLIC ACID 1 MG: 1 TABLET ORAL at 08:58

## 2020-02-10 RX ADMIN — SPIRONOLACTONE 100 MG: 25 TABLET ORAL at 08:58

## 2020-02-10 RX ADMIN — DULOXETINE HYDROCHLORIDE 30 MG: 30 CAPSULE, DELAYED RELEASE ORAL at 08:58

## 2020-02-10 RX ADMIN — PROPRANOLOL HYDROCHLORIDE 10 MG: 10 TABLET ORAL at 20:30

## 2020-02-10 RX ADMIN — PROPRANOLOL HYDROCHLORIDE 10 MG: 10 TABLET ORAL at 09:09

## 2020-02-10 RX ADMIN — MIRTAZAPINE 15 MG: 15 TABLET, FILM COATED ORAL at 21:35

## 2020-02-10 RX ADMIN — TRAZODONE HYDROCHLORIDE 200 MG: 50 TABLET ORAL at 21:35

## 2020-02-10 RX ADMIN — DICYCLOMINE HYDROCHLORIDE 20 MG: 20 TABLET ORAL at 06:33

## 2020-02-10 RX ADMIN — PANTOPRAZOLE SODIUM 40 MG: 40 TABLET, DELAYED RELEASE ORAL at 06:33

## 2020-02-10 RX ADMIN — FLUTICASONE FUROATE AND VILANTEROL TRIFENATATE 1 PUFF: 200; 25 POWDER RESPIRATORY (INHALATION) at 08:57

## 2020-02-10 RX ADMIN — DICYCLOMINE HYDROCHLORIDE 20 MG: 20 TABLET ORAL at 16:24

## 2020-02-10 RX ADMIN — DEXTRAN 70 AND HYPROMELLOSE 2910 2 DROP: 1; 3 SOLUTION/ DROPS OPHTHALMIC at 19:42

## 2020-02-10 RX ADMIN — FUROSEMIDE 40 MG: 40 TABLET ORAL at 08:58

## 2020-02-10 RX ADMIN — DICYCLOMINE HYDROCHLORIDE 20 MG: 20 TABLET ORAL at 12:36

## 2020-02-10 RX ADMIN — LACTULOSE 20 G: 10 SOLUTION ORAL at 08:56

## 2020-02-10 RX ADMIN — LACTULOSE 20 G: 10 SOLUTION ORAL at 20:30

## 2020-02-10 RX ADMIN — DICYCLOMINE HYDROCHLORIDE 20 MG: 20 TABLET ORAL at 21:35

## 2020-02-10 RX ADMIN — MULTIPLE VITAMINS W/ MINERALS TAB 1 TABLET: TAB at 08:58

## 2020-02-10 ASSESSMENT — ACTIVITIES OF DAILY LIVING (ADL)
ADLS_ACUITY_SCORE: 19

## 2020-02-10 NOTE — PROGRESS NOTES
[facility]    Hospitalist Progress Note      Assessment & Plan   Jim Richard is a 64 year old male who was admitted on 2/3/2020. His history is significant for cirrhosis (w/ ascites, esophageal varices), EtOH use disorder, depression/anxiety, COPD, sleep apnea, anemia, admitted for EtOH intoxication and SI.     He was recently hospitalized at [facility] for weakness in EtOH withdrawal, and was seen by GI, CD, and Psych. It appears the initial plan was for him to go to TCU w/ close GI f/u to adjust his diuretics, but he refused TCU and went home. Since going home he has been drinking, medication compliance sporadic, with worsening abdominal ascites. He came in for SI in the setting of drinking.      # Alcohol Withdrawal: has had 4 alcohol-related admission in the past year  - appreciate CD assistance: patient will need to go to TCU first, then set up outpatient rehab  - appreciate Psychiatry consult, no indication to pursue commitment at this time  - cont daily thiamine, folate, MVI  - cont PT/OT: rec TCU     # Alcoholic Cirrhosis with Esophageal Varices  - appreciate Gastroenterology assistance  - cont Spironolactone 100mg PO daily  - cont Lasix 40mg PO daily  - cont Propranolol 10mg PO BID  - cont Lactulose 20g PO BID, titrate to 2-3 BMs per day     # Anemia with hx of Esophageal Varices  - appreciate GI assistance     # Depression with Suicidal Ideation: reported SI prior to hospitalization although no further SI noted after arrival  - appreciate Psychiatry assistance  - cont Duloxetine, Mirtazapine, Trazodone, prn Seroquel     # FEN  - low Na diet     # Dispo  - pending TCU placement followed by outpatient rehab  - I spoke with Aleksandra (daughter) again this morning and updated her on the current plan.     DVT Prophylaxis: Pneumatic Compression Devices  Code Status: DNR/DNI  Expected discharge: 2 - 3 days, recommended to transitional care unit once bed is available.    Bang Orellana MD  Text  page (7am - 6pm)    Interval History   - feels okay today, tolerating diet, having normal BMs  - worked with PT, definitely feeling weak  - willing to go to TCU and then rehab  - 10+ ROS otherwise negative    -Data reviewed today: I reviewed all new labs and imaging results over the last 24 hours.    Physical Exam   Temp: 98  F (36.7  C) Temp src: Oral BP: 126/67   Heart Rate: 63 Resp: 20 SpO2: 98 % O2 Device: None (Room air)    Vitals:    02/03/20 0800   Weight: 89.6 kg (197 lb 8.5 oz)     Vital Signs with Ranges  Temp:  [98  F (36.7  C)-98.3  F (36.8  C)] 98  F (36.7  C)  Heart Rate:  [53-63] 63  Resp:  [16-20] 20  BP: (106-126)/(50-67) 126/67  SpO2:  [95 %-98 %] 98 %  I/O last 3 completed shifts:  In: 360 [P.O.:360]  Out: -     Constitutional: Awake, alert, cooperative, no apparent distress.  Respiratory: Clear to auscultation bilaterally, no crackles or wheezing.  Cardiovascular: Regular rate and rhythm, normal S1 and S2, and no murmur noted.  GI: Soft, non-distended, non-tender, normal bowel sounds.  Skin: No rashes, no cyanosis, no edema.  Musculoskeletal: No joint swelling, erythema or tenderness.  Neurologic: Cranial nerves 2-12 intact, normal strength and sensation.  Psychiatric: Alert, oriented to person, place and time, no obvious anxiety or depression.    Medications       dicyclomine  20 mg Oral 4x Daily AC & HS     DULoxetine  30 mg Oral Daily     fluticasone-vilanterol  1 puff Inhalation Daily     folic acid  1 mg Oral Daily     furosemide  40 mg Oral Daily     lactulose  20 g Oral BID     mirtazapine  15 mg Oral At Bedtime     multivitamin w/minerals  1 tablet Oral Daily     nicotine  1 patch Transdermal Daily     nicotine   Transdermal Q8H     pantoprazole  40 mg Oral QAM AC     propranolol  10 mg Oral BID     spironolactone  100 mg Oral Daily     traZODone  200 mg Oral At Bedtime       Data   Recent Labs   Lab 02/10/20  0728 02/09/20  0810 02/08/20  0735  02/07/20  0758 02/06/20  1413  02/06/20  0946 02/06/20  0805  02/04/20  0800   WBC  --   --  3.3*  --  3.4*  --   --  3.9*   < >  --    HGB  --   --  8.8*  --  8.2* 8.6*  --  8.4*   < >  --    MCV  --   --  96  --  97  --   --  97   < >  --    PLT  --   --  67*  --  58*  --   --  49*   < >  --    INR  --   --   --   --  1.36*  --  1.29*  --   --   --     133 136  --  134  --   --   --    < > 134   POTASSIUM 3.7 3.7 3.8   < > 3.2*  --   --   --    < > 3.6   CHLORIDE 102 100 105  --  102  --   --   --    < > 104   CO2 26 26 29  --  25  --   --   --    < > 23   BUN 25 24 24  --  24  --   --   --    < > 23   CR 1.38* 1.38* 1.26*  --  1.20  --   --   --    < > 1.09   ANIONGAP 6 7 2*  --  7  --   --   --    < > 7   KEV 8.6 8.7 8.4*  --  8.2*  --   --   --    < > 7.6*   GLC 99 94 95  --  90  --   --   --    < > 86   ALBUMIN  --   --   --   --  2.2*  --   --   --   --  2.3*   PROTTOTAL  --   --   --   --  5.6*  --   --   --   --  5.9*   BILITOTAL  --   --   --   --  1.0  --   --   --   --  2.5*   ALKPHOS  --   --   --   --  194*  --   --   --   --  234*   ALT  --   --   --   --  19  --   --   --   --  22   AST  --   --   --   --  37  --   --   --   --  53*    < > = values in this interval not displayed.       No results found for this or any previous visit (from the past 24 hour(s)).

## 2020-02-10 NOTE — PLAN OF CARE
Discharge Planner PT   Patient plan for discharge: TCU, then residential CD treatment  Current status: Pt completed bed mobilty with SBA and transfers w/ CGA and WW. Pt ambulated 400 ft with WW and SBA, dec step length and widened [place], cued to increase stride with little follow through. Pt completed standing balance exercises with good effort.   Barriers to return to prior living situation: current level of assist, high falls risk  Recommendations for discharge: TCU   Rationale for recommendations: Patient will benefit from continued skilled therapies in TCU to progress safety and independence with mobility prior to returning to community setting or beginning residential CD treatment.       Entered by: Rosita Barry 02/10/2020 10:42 AM

## 2020-02-10 NOTE — PLAN OF CARE
DATE & TIME: 2/10/2020     Cognitive Concerns/ Orientation : Alert and Oriented x4   BEHAVIOR & AGGRESSION TOOL COLOR: Green   CIWA SCORE: Discontinued today               ABNL VS/O2: VSS on RA   MOBILITY: Assist of one   PAIN MANAGMENT: Denies   DIET: 2GM Na   BOWEL/BLADDER: Occasional incontinence   ABNL LAB/BG: NA  DRAIN/DEVICES: PIV SL  TELEMETRY RHYTHM: SB w/ BBB  SKIN: Bruised, blanchable redness, Skin tear to right forearm.    TESTS/PROCEDURES: NA  D/C DAY/GOALS/PLACE: No time set, alcohol rehab is recommended.   OTHER IMPORTANT INFO: Abdomen is round and distended.  Patient refused Lactulose this evening, stated he had 2 BM's today.

## 2020-02-10 NOTE — PLAN OF CARE
Discharge Planner OT   Patient plan for discharge: pt unsure but Per chart--TCU, then residential CD treatment.  Current status: pt completes transfers and mobility with SBA and use of FWW, stood at sink for ADLS SBA.   Barriers to return to prior living situation: weakness, impaired cognition/safety, falls risk  Recommendations for discharge: TCU per plan established by the Occupational Therapist  Rationale for recommendations: Pt. would benfit from continued skilled OT intervention to assist pt. to return to PLOF/achieve maximal safety/indep. W/ I/ADL's       Entered by: Krista Dolan 02/10/2020 7:51 AM

## 2020-02-10 NOTE — PROGRESS NOTES
"CLINICAL NUTRITION SERVICES  -  ASSESSMENT NOTE    Recommendations Ordered by Registered Dietitian (RD):   Will send [place] Boost at 2 pm.    Malnutrition:   % Weight Loss:  None noted  % Intake:  No decreased intake noted  Subcutaneous Fat Loss:  None observed  Muscle Loss:  None observed  Fluid Retention:  LE Trace    Malnutrition Diagnosis: Patient does not meet two of the above criteria necessary for diagnosing malnutrition     REASON FOR ASSESSMENT  Jim Richard is a 64 year old male seen by Registered Dietitian for LOS     NUTRITION HISTORY  - Information obtained from patient  - Patient is on a regular diet at home  - Typical food/fluid intake is one meal per day. May be a can of soup or a hamburger.  Pt prepares his own food. Doesn't like to go to the grocery store and isn't very good at cooking. Discussed looking into a meal delivery program.   - Pt has not noticed any wt changes  - Pt reports regular BM's     CURRENT NUTRITION ORDERS  Diet Order:     2000 mg Sodium     Current Intake/Tolerance:  Per flowsheet, intake % or 1-2 meals daily.     NUTRITION FOCUSED PHYSICAL ASSESSMENT FOR DIAGNOSING MALNUTRITION)  Yes               Observed:    No nutrition-related physical findings observed    Obtained from Chart/Interdisciplinary Team:  None noted    ANTHROPOMETRICS  Height: 5' 7\"  Weight: 197 lbs 8.51 oz  (89.6 kg)  Body mass index is 30.94 kg/m .  Weight Status:  Obesity Grade I BMI 30-34.9  IBW: 148# (67.3 kg)  % IBW: 133%  Weight History: No wt loss noted, but no updated wt since 2/3. Pt states usual wt is around 180#.  Wt Readings from Last 10 Encounters:   02/03/20 89.6 kg (197 lb 8.5 oz)   01/16/20 83.5 kg (184 lb 1.4 oz)   12/24/19 88.5 kg (195 lb)   11/19/19 77.1 kg (170 lb)   11/13/19 77.1 kg (170 lb)   10/30/19 77.8 kg (171 lb 9.6 oz)   10/01/19 75.8 kg (167 lb)   08/13/19 84 kg (185 lb 3 oz)   07/10/19 90.7 kg (200 lb)   07/03/19 90.7 kg (200 lb)     LABS  Cr 1.38 " (H)    MEDICATIONS  Folic acid  Multivitamin w/ minerals    MALNUTRITION:  % Weight Loss:  None noted  % Intake:  No decreased intake noted  Subcutaneous Fat Loss:  None observed  Muscle Loss:  None observed  Fluid Retention:  LE Trace    Malnutrition Diagnosis: Patient does not meet two of the above criteria necessary for diagnosing malnutrition    NUTRITION DIAGNOSIS:  Inadequate oral intake related to alcohol abuse and limited ability to prepare food as evidenced by pt reports minimal intake PTA.    NUTRITION INTERVENTIONS  Recommendations / Nutrition Prescription  Continue 2000 mg Sodium   Adding [place]rawberry Boost at 2 pm    Implementation  Nutrition education: Provided education on small frequent meals with emphasis on protein. Discussed supplements.   Medical Food Supplement - ordered as above    Nutrition Goals  Pt to consume % of meals and supplements.     MONITORING AND EVALUATION:  Will re-evaluate in 7 - 10 days, or sooner, if re-consulted.    Adriana Burgess RDN, LD

## 2020-02-10 NOTE — PLAN OF CARE
DATE & TIME: 2/10 Day       Cognitive Concerns/ Orientation : A&Ox4   BEHAVIOR & AGGRESSION TOOL COLOR: Green, restless  CIWA SCORE: n/a  ABNL VS/O2: VSS on RA.  MOBILITY: SBA GB/walker  PAIN MANAGMENT: Denied  DIET: 2 g. Sodium diet  BOWEL/BLADDER: Continent this shift, can be incontinent at times.  ABNL LAB/BG: Creat 1.38.  DRAIN/DEVICES: PIV SL  TELEMETRY RHYTHM: SB w/ BBB and occasional PACs  SKIN: Scattered bruising. Skin tears on RUE.   TESTS/PROCEDURES: n/a  D/C DAY/GOALS/PLACE: Medically clear, plan is to TCU then ETOH rehab, SW following.  OTHER IMPORTANT INFO: Baseline tremor. Seizure precautions discontinued- withdrawal sx resolved.

## 2020-02-11 ENCOUNTER — APPOINTMENT (OUTPATIENT)
Dept: PHYSICAL THERAPY | Facility: CLINIC | Age: 65
DRG: 433 | End: 2020-02-11
Payer: MEDICARE

## 2020-02-11 ENCOUNTER — APPOINTMENT (OUTPATIENT)
Dept: OCCUPATIONAL THERAPY | Facility: CLINIC | Age: 65
DRG: 433 | End: 2020-02-11
Payer: MEDICARE

## 2020-02-11 PROCEDURE — 25000132 ZZH RX MED GY IP 250 OP 250 PS 637: Mod: GY | Performed by: INTERNAL MEDICINE

## 2020-02-11 PROCEDURE — 25000132 ZZH RX MED GY IP 250 OP 250 PS 637: Mod: GY | Performed by: PSYCHIATRY & NEUROLOGY

## 2020-02-11 PROCEDURE — 99232 SBSQ HOSP IP/OBS MODERATE 35: CPT | Performed by: INTERNAL MEDICINE

## 2020-02-11 PROCEDURE — 25000132 ZZH RX MED GY IP 250 OP 250 PS 637: Mod: GY | Performed by: PHYSICIAN ASSISTANT

## 2020-02-11 PROCEDURE — 97535 SELF CARE MNGMENT TRAINING: CPT | Mod: GO | Performed by: OCCUPATIONAL THERAPY ASSISTANT

## 2020-02-11 PROCEDURE — 99207 ZZC CDG-CUT & PASTE-POTENTIAL IMPACT ON LEVEL: CPT | Performed by: INTERNAL MEDICINE

## 2020-02-11 PROCEDURE — 97530 THERAPEUTIC ACTIVITIES: CPT | Mod: GO | Performed by: OCCUPATIONAL THERAPY ASSISTANT

## 2020-02-11 PROCEDURE — 97116 GAIT TRAINING THERAPY: CPT | Mod: GP

## 2020-02-11 PROCEDURE — 12000000 ZZH R&B MED SURG/OB

## 2020-02-11 PROCEDURE — 25000132 ZZH RX MED GY IP 250 OP 250 PS 637: Mod: GY | Performed by: HOSPITALIST

## 2020-02-11 RX ORDER — LACTULOSE 10 G/15ML
10 SOLUTION ORAL 2 TIMES DAILY
Status: DISCONTINUED | OUTPATIENT
Start: 2020-02-11 | End: 2020-02-26 | Stop reason: HOSPADM

## 2020-02-11 RX ORDER — SPIRONOLACTONE 25 MG/1
50 TABLET ORAL DAILY
Status: DISCONTINUED | OUTPATIENT
Start: 2020-02-12 | End: 2020-02-25

## 2020-02-11 RX ORDER — FUROSEMIDE 20 MG
20 TABLET ORAL DAILY
Status: DISCONTINUED | OUTPATIENT
Start: 2020-02-12 | End: 2020-02-26 | Stop reason: HOSPADM

## 2020-02-11 RX ADMIN — SPIRONOLACTONE 100 MG: 25 TABLET ORAL at 08:33

## 2020-02-11 RX ADMIN — DEXTRAN 70 AND HYPROMELLOSE 2910 2 DROP: 1; 3 SOLUTION/ DROPS OPHTHALMIC at 13:04

## 2020-02-11 RX ADMIN — DICYCLOMINE HYDROCHLORIDE 20 MG: 20 TABLET ORAL at 12:15

## 2020-02-11 RX ADMIN — MIRTAZAPINE 15 MG: 15 TABLET, FILM COATED ORAL at 21:33

## 2020-02-11 RX ADMIN — PANTOPRAZOLE SODIUM 40 MG: 40 TABLET, DELAYED RELEASE ORAL at 07:05

## 2020-02-11 RX ADMIN — MULTIPLE VITAMINS W/ MINERALS TAB 1 TABLET: TAB at 08:34

## 2020-02-11 RX ADMIN — PROPRANOLOL HYDROCHLORIDE 10 MG: 10 TABLET ORAL at 08:33

## 2020-02-11 RX ADMIN — TRAZODONE HYDROCHLORIDE 200 MG: 50 TABLET ORAL at 21:33

## 2020-02-11 RX ADMIN — DICYCLOMINE HYDROCHLORIDE 20 MG: 20 TABLET ORAL at 15:57

## 2020-02-11 RX ADMIN — OXYCODONE HYDROCHLORIDE 5 MG: 5 TABLET ORAL at 18:41

## 2020-02-11 RX ADMIN — DICYCLOMINE HYDROCHLORIDE 20 MG: 20 TABLET ORAL at 21:33

## 2020-02-11 RX ADMIN — FLUTICASONE FUROATE AND VILANTEROL TRIFENATATE 1 PUFF: 200; 25 POWDER RESPIRATORY (INHALATION) at 09:12

## 2020-02-11 RX ADMIN — Medication 1 SPRAY: at 08:36

## 2020-02-11 RX ADMIN — DULOXETINE HYDROCHLORIDE 30 MG: 30 CAPSULE, DELAYED RELEASE ORAL at 08:34

## 2020-02-11 RX ADMIN — DEXTRAN 70 AND HYPROMELLOSE 2910 2 DROP: 1; 3 SOLUTION/ DROPS OPHTHALMIC at 08:35

## 2020-02-11 RX ADMIN — FOLIC ACID 1 MG: 1 TABLET ORAL at 08:35

## 2020-02-11 RX ADMIN — FUROSEMIDE 40 MG: 40 TABLET ORAL at 08:34

## 2020-02-11 RX ADMIN — LACTULOSE 10 G: 10 SOLUTION ORAL at 21:32

## 2020-02-11 RX ADMIN — DICYCLOMINE HYDROCHLORIDE 20 MG: 20 TABLET ORAL at 07:05

## 2020-02-11 RX ADMIN — NICOTINE 1 PATCH: 14 PATCH, EXTENDED RELEASE TRANSDERMAL at 08:37

## 2020-02-11 RX ADMIN — PROPRANOLOL HYDROCHLORIDE 10 MG: 10 TABLET ORAL at 21:33

## 2020-02-11 RX ADMIN — LACTULOSE 20 G: 10 SOLUTION ORAL at 08:35

## 2020-02-11 ASSESSMENT — ACTIVITIES OF DAILY LIVING (ADL)
ADLS_ACUITY_SCORE: 18
ADLS_ACUITY_SCORE: 18
ADLS_ACUITY_SCORE: 19
ADLS_ACUITY_SCORE: 18

## 2020-02-11 NOTE — PLAN OF CARE
DATE & TIME: 02/11/2020 7-3 pm                Cognitive Concerns/ Orientation : A & O x4  BEHAVIOR & AGGRESSION TOOL COLOR: Green   CIWA SCORE: N/A  ABNL VS/O2: VSS on RA  MOBILITY: SBA w/ gait belt and walker.   PAIN MANAGMENT: Denies  DIET: 2 gm NA, good appetite  BOWEL/BLADDER: 3 BMs this shift.   ABNL LAB/BG: Cr 1.38  DRAIN/DEVICES: PIV SL  TELEMETRY RHYTHM: N/A  SKIN: Skin tears R lower forearm, dressings removed. Keeping open to air. Dusky, +3 BLE edema.   TESTS/PROCEDURES: None scheduled.   D/C DAY/GOALS/PLACE: TCU when bed placement found.  OTHER IMPORTANT INFO: Baseline tremor to BUE. Abdomen distended. Showered.

## 2020-02-11 NOTE — PLAN OF CARE
Discharge Planner OT   Patient plan for discharge: none stated today  Current status: pt in a hurry to amb to bathroom due to urgency cues needed for safety and CGA to amb without AD to bathroom, toilet transfer CGA pt independent to management clothing and complete pericares. SBA standing at sink for ADLS.   Barriers to return to prior living situation: weakness, impaired cognition/safety, falls risk  Recommendations for discharge: TCU per plan established by the Occupational Therapist  Rationale for recommendations: Pt. would benfit from continued skilled OT intervention to assist pt. to return to PLOF/achieve maximal safety/indep. W/ I/ADL's       Entered by: Krista Dolan 02/11/2020 11:04 AM

## 2020-02-11 NOTE — PLAN OF CARE
Discharge Planner PT   Patient plan for discharge: TCU, then residential CD treatment  Current status: Pt transferred STS x2 this session with SBA. Gait training 350' with FWW and SBA and 100' with no AD. Following gait pt left sitting in chair with alarm on and needs in reach.    Barriers to return to prior living situation: current level of assist, high falls risk  Recommendations for discharge: TCU per plan established by the PT.   Rationale for recommendations: Patient will benefit from continued skilled therapies in TCU to progress safety and independence with mobility prior to returning to community setting or beginning residential CD treatment.       Entered by: Yun Kingsley 02/11/2020 3:19 PM

## 2020-02-11 NOTE — PLAN OF CARE
DATE & TIME: 2/11/20 [id number]    Cognitive Concerns/ Orientation : A&O x4   BEHAVIOR & AGGRESSION TOOL COLOR: Green  CIWA SCORE: NA   ABNL VS/O2: VSS on room air   MOBILITY: SBA +gb  PAIN MANAGMENT: Denies   DIET: 2 g Na+  BOWEL/BLADDER: Incontinent at times, 1 bm this shift  ABNL LAB/BG: None  DRAIN/DEVICES: PIV L arm saline locked  TELEMETRY RHYTHM: NA  SKIN: Skin tears R lower forearm; bruising   TESTS/PROCEDURES: None  D/C DAY/GOALS/PLACE: Discharge pending placement   OTHER IMPORTANT INFO: Baseline tremor

## 2020-02-11 NOTE — PROGRESS NOTES
[facility]  Gastroenterology Progress Note     Jim Richard MRN# [id number]   YOB: 1955 Age: 64 year old          Assessment and Plan:   Update: Patient had stable hemoglobin and platelets.  Abdomen soft. No stools this a.m. NO report of melena. Creatinine has increased to 1.38 from baseline of 1.20.     Active Problems:   Alcohol withdrawal (H)  Alcohol liver cirrhosis with ascites and esophageal varices  Jim Richard is a pleasant 64 year old male with history of alcohol abuse, alcoholic liver cirrhosis with ascites and esophageal varices with diarrhea for 3 days. GI consulted for continuation of care and history of cirrhosis. Patient has had exacerbation of symptoms associated with excessive alcohol intake. Underwent ultrasound guided paracentesis and had 6750 mL of serous fluid removed. Hemoglobin stable and at baseline. Has been noncompliant with medication. Has been started on propranolol with history of esophageal varices. Has had lactulose held with diarrhea and stools incontinence and diarrhea. Having 1-2 stools a day and now more formed. Has had elevation in creatinine with addition of diuretics. Will decrease dosage.     - Recommend to consider inpatient alcohol treatment given 4 admissions in last year for alcohol related problems  - Decrease spironolactone to 50 mg and furosemide 20 mg daily  - May restart lactulose if stools less than 3 a day.- will start with one tablespoon a day 10g/15mL  - Continue with pantoprazole and propranolol  - Continue on dicyclomine for abdominal cramping          Alcoholic cirrhosis of liver with ascites (H)  Alcoholic intoxication with complication (H)  Suicidal ideation      Interval History:   no new complaints, denies chest pain, denies shortness of breath, denies abdominal pain, alert, oriented to person, place and time and doing well; no cp, sob, n/v/d, or abd pain.              Review of Systems:   C: NEGATIVE for fever,  chills, change in weight  E/M: NEGATIVE for ear, mouth and throat problems  R: NEGATIVE for significant cough or SOB  CV: NEGATIVE for chest pain, palpitations or peripheral edema             Medications:   I have reviewed this patient's current medications    dicyclomine  20 mg Oral 4x Daily AC & HS     DULoxetine  30 mg Oral Daily     fluticasone-vilanterol  1 puff Inhalation Daily     folic acid  1 mg Oral Daily     [START ON 2/12/2020] furosemide  20 mg Oral Daily     lactulose  20 g Oral BID     mirtazapine  15 mg Oral At Bedtime     multivitamin w/minerals  1 tablet Oral Daily     nicotine  1 patch Transdermal Daily     nicotine   Transdermal Q8H     pantoprazole  40 mg Oral QAM AC     propranolol  10 mg Oral BID     [START ON 2/12/2020] spironolactone  50 mg Oral Daily     traZODone  200 mg Oral At Bedtime                  Physical Exam:   Vitals were reviewed  Vital Signs with Ranges  Temp:  [97.9  F (36.6  C)-98.6  F (37  C)] 98.6  F (37  C)  Pulse:  [56] 56  Heart Rate:  [53-58] 58  Resp:  [16-20] 20  BP: (108-130)/(61-70) 116/64  SpO2:  [96 %-99 %] 96 %  I/O last 3 completed shifts:  In: 640 [P.O.:640]  Out: -   Constitutional: healthy, alert and no distress   Cardiovascular: negative, PMI normal. No lifts, heaves, or thrills. RRR. No murmurs, clicks gallops or rub  Respiratory: negative, Percussion normal. Good diaphragmatic excursion. Lungs clear  Head: Normocephalic. No masses, lesions, tenderness or abnormalities  Neck: Neck supple. No adenopathy. Thyroid symmetric, normal size,, Carotids without bruits.  Abdomen: Abdomen soft, non-tender. BS normal. No masses, organomegaly           Data:   I reviewed the patient's new clinical lab test results.   Recent Labs   Lab Test 02/08/20  0735 02/07/20  0758 02/06/20  1413 02/06/20  0946 02/06/20  0805  02/03/20  0218   WBC 3.3* 3.4*  --   --  3.9*   < > 7.9   HGB 8.8* 8.2* 8.6*  --  8.4*   < > 9.6*   MCV 96 97  --   --  97   < > 97   PLT 67* 58*  --   --   49*   < > 153   INR  --  1.36*  --  1.29*  --   --  1.18*    < > = values in this interval not displayed.     Recent Labs   Lab Test 02/10/20  0728 02/09/20  0810 02/08/20  0735   POTASSIUM 3.7 3.7 3.8   CHLORIDE 102 100 105   CO2 26 26 29   BUN 25 24 24   ANIONGAP 6 7 2*     Recent Labs   Lab Test 02/07/20  0758 02/04/20  0800 02/03/20  0218  01/11/20  1730  11/13/19  1858 11/13/19  1849  02/13/19  2340  09/25/14  0510   ALBUMIN 2.2* 2.3* 2.7*   < >  --    < > 3.0*  --    < > 3.1*   < >  --    BILITOTAL 1.0 2.5* 1.6*   < >  --    < > 1.0  --    < > 0.4   < >  --    ALT 19 22 26   < >  --    < > 24  --    < > 41   < >  --    AST 37 53* 61*   < >  --    < > 58*  --    < > 89*   < >  --    PROTEIN  --   --   --   --  Negative  --   --  30*  --   --   --  Negative   LIPASE  --   --  369  --   --   --  499*  --   --  536*   < >  --     < > = values in this interval not displayed.       I reviewed the patient's new imaging results.    All laboratory data reviewed  All imaging studies reviewed by me.    Gloria De Leon PA-C,  2/11/2020  [facility]  Office : [phone]  Cell: [phone] (Dr. Shaw)  Cell: [phone] (Gloria De Leon PA-C)

## 2020-02-11 NOTE — PLAN OF CARE
DATE & TIME: 2/10 Mandy   Cognitive Concerns/ Orientation : A&Ox4   BEHAVIOR & AGGRESSION TOOL COLOR: Green, requests to walk frequently   CIWA SCORE: n/a  ABNL VS/O2: VSS on RA.  MOBILITY: SBA GB/walker ambulating halls   PAIN MANAGMENT: Denied  DIET: 2 g. Sodium diet  BOWEL/BLADDER: Continent this shift, can be incontinent at times.  ABNL LAB/BG: Creat 1.38.  DRAIN/DEVICES: PIV SL  TELEMETRY RHYTHM: Discontinued   SKIN: Scattered bruising. Skin tears on RUE.   TESTS/PROCEDURES: n/a  D/C DAY/GOALS/PLACE: Medically clear, plan is to TCU then ETOH rehab, SW following.  OTHER IMPORTANT INFO: Baseline tremor. Pt would like to shower tomorrow.

## 2020-02-11 NOTE — PROGRESS NOTES
[facility]    Hospitalist Progress Note      Assessment & Plan   Jim Richard is a 64 year old male who was admitted on 2/3/2020. His history is significant for cirrhosis (w/ ascites, esophageal varices), EtOH use disorder, depression/anxiety, COPD, sleep apnea, anemia, admitted for EtOH intoxication and SI.     He was recently hospitalized at [facility] for weakness in EtOH withdrawal, and was seen by GI, CD, and Psych. It appears the initial plan was for him to go to TCU w/ close GI f/u to adjust his diuretics, but he refused TCU and went home. Since going home he has been drinking, medication compliance sporadic, with worsening abdominal ascites. He came in for SI in the setting of drinking.      # Alcohol Withdrawal: has had 4 alcohol-related admission in the past year  - appreciate CD assistance: patient will need to go to TCU first, then set up outpatient rehab  - appreciate Psychiatry consult, no indication to pursue commitment at this time  - cont daily thiamine, folate, MVI  - cont PT/OT: rec TCU     # Alcoholic Cirrhosis with Esophageal Varices  - appreciate Gastroenterology assistance  - cont Spironolactone 100mg PO daily  - cont Lasix 40mg PO daily  - cont Propranolol 10mg PO BID  - cont Lactulose 20g PO BID, titrate to 2-3 BMs per day     # Anemia with hx of Esophageal Varices  - appreciate GI assistance     # Depression with Suicidal Ideation: reported SI prior to hospitalization although no further SI noted after arrival  - appreciate Psychiatry assistance  - cont Duloxetine, Mirtazapine, Trazodone, prn Seroquel     # FEN  - low Na diet     # Dispo  - pending TCU placement followed by outpatient rehab     DVT Prophylaxis: Pneumatic Compression Devices  Code Status: DNR/DNI  Expected discharge: 2 - 3 days, recommended to transitional care unit once bed is available.      Bang Orellana MD  Text page (7am - 6pm)    Interval History   - feels well today  - tolerating diet  - had 2  soft BMs in the past day  - 10+ ROS otherwise negative    -Data reviewed today: I reviewed all new labs and imaging results over the last 24 hours.    Physical Exam   Temp: 98.6  F (37  C) Temp src: Oral BP: 116/64 Pulse: 56 Heart Rate: 58 Resp: 20 SpO2: 96 % O2 Device: None (Room air)    Vitals:    02/03/20 0800   Weight: 89.6 kg (197 lb 8.5 oz)     Vital Signs with Ranges  Temp:  [97.9  F (36.6  C)-98.6  F (37  C)] 98.6  F (37  C)  Pulse:  [56] 56  Heart Rate:  [53-58] 58  Resp:  [16-20] 20  BP: (108-130)/(61-70) 116/64  SpO2:  [96 %-99 %] 96 %  I/O last 3 completed shifts:  In: 640 [P.O.:640]  Out: -     Constitutional: Awake, alert, cooperative, no apparent distress.  Respiratory: Clear to auscultation bilaterally, no crackles or wheezing.  Cardiovascular: Regular rate and rhythm, normal S1 and S2, and no murmur noted.  GI: Soft, non-distended, non-tender, normal bowel sounds.  Skin: No rashes, no cyanosis, no edema.  Musculoskeletal: noted tremors in bilateral hands  Neurologic: Cranial nerves 2-12 intact, normal strength and sensation.  Psychiatric: Alert, oriented to person, place and time, no obvious anxiety or depression.     Medications       dicyclomine  20 mg Oral 4x Daily AC & HS     DULoxetine  30 mg Oral Daily     fluticasone-vilanterol  1 puff Inhalation Daily     folic acid  1 mg Oral Daily     furosemide  40 mg Oral Daily     lactulose  20 g Oral BID     mirtazapine  15 mg Oral At Bedtime     multivitamin w/minerals  1 tablet Oral Daily     nicotine  1 patch Transdermal Daily     nicotine   Transdermal Q8H     pantoprazole  40 mg Oral QAM AC     propranolol  10 mg Oral BID     spironolactone  100 mg Oral Daily     traZODone  200 mg Oral At Bedtime       Data   Recent Labs   Lab 02/10/20  0728 02/09/20  0810 02/08/20  0735  02/07/20  0758 02/06/20  1413 02/06/20  0946 02/06/20  0805   WBC  --   --  3.3*  --  3.4*  --   --  3.9*   HGB  --   --  8.8*  --  8.2* 8.6*  --  8.4*   MCV  --   --  96  --  97   --   --  97   PLT  --   --  67*  --  58*  --   --  49*   INR  --   --   --   --  1.36*  --  1.29*  --     133 136  --  134  --   --   --    POTASSIUM 3.7 3.7 3.8   < > 3.2*  --   --   --    CHLORIDE 102 100 105  --  102  --   --   --    CO2 26 26 29  --  25  --   --   --    BUN 25 24 24  --  24  --   --   --    CR 1.38* 1.38* 1.26*  --  1.20  --   --   --    ANIONGAP 6 7 2*  --  7  --   --   --    KEV 8.6 8.7 8.4*  --  8.2*  --   --   --    GLC 99 94 95  --  90  --   --   --    ALBUMIN  --   --   --   --  2.2*  --   --   --    PROTTOTAL  --   --   --   --  5.6*  --   --   --    BILITOTAL  --   --   --   --  1.0  --   --   --    ALKPHOS  --   --   --   --  194*  --   --   --    ALT  --   --   --   --  19  --   --   --    AST  --   --   --   --  37  --   --   --     < > = values in this interval not displayed.       No results found for this or any previous visit (from the past 24 hour(s)).

## 2020-02-11 NOTE — PROGRESS NOTES
SW:  D:  In reviewing CD note, writer is aware an assessment was not completed and the discharge plan is to a TCU.  Writer has sent an email to the lead LADC for [facility], Sherly Royal, asking that patient receive his CD consult here so a referral can be made to [facility].  The goal, discussed with patient, is for him to go to TCU and then transfer to [facility].  PT and OT do still suggest TCU.  Patient in the past has agreed to TCU and then as he begins to feel better he chooses to go home.   Writer met with patient and discuss is previous pattern.  He states he will agree to a TCU with goal of staying there and admitting directly to [facility] as the goal.  Referrals made to multiple TCU's. Did not send referrals to [facility] or [facility] as patient has been there before and declined returning.  Was writer has options will present him with the medicare reports. Patient's options are somewhat limited as many TCU's will not accept a patient who has an active Substance Abuse in the community.   Anticipate discharge tomorrow.

## 2020-02-12 LAB
ANION GAP SERPL CALCULATED.3IONS-SCNC: 7 MMOL/L (ref 3–14)
BUN SERPL-MCNC: 27 MG/DL (ref 7–30)
CALCIUM SERPL-MCNC: 8.8 MG/DL (ref 8.5–10.1)
CHLORIDE SERPL-SCNC: 103 MMOL/L (ref 94–109)
CO2 SERPL-SCNC: 23 MMOL/L (ref 20–32)
CREAT SERPL-MCNC: 1.44 MG/DL (ref 0.66–1.25)
GFR SERPL CREATININE-BSD FRML MDRD: 50 ML/MIN/{1.73_M2}
GLUCOSE SERPL-MCNC: 108 MG/DL (ref 70–99)
POTASSIUM SERPL-SCNC: 4.5 MMOL/L (ref 3.4–5.3)
SODIUM SERPL-SCNC: 133 MMOL/L (ref 133–144)

## 2020-02-12 PROCEDURE — 25000132 ZZH RX MED GY IP 250 OP 250 PS 637: Mod: GY | Performed by: INTERNAL MEDICINE

## 2020-02-12 PROCEDURE — 25000132 ZZH RX MED GY IP 250 OP 250 PS 637: Mod: GY | Performed by: PSYCHIATRY & NEUROLOGY

## 2020-02-12 PROCEDURE — 80048 BASIC METABOLIC PNL TOTAL CA: CPT | Performed by: INTERNAL MEDICINE

## 2020-02-12 PROCEDURE — 25000132 ZZH RX MED GY IP 250 OP 250 PS 637: Mod: GY | Performed by: HOSPITALIST

## 2020-02-12 PROCEDURE — 12000000 ZZH R&B MED SURG/OB

## 2020-02-12 PROCEDURE — 36415 COLL VENOUS BLD VENIPUNCTURE: CPT | Performed by: INTERNAL MEDICINE

## 2020-02-12 PROCEDURE — 25000132 ZZH RX MED GY IP 250 OP 250 PS 637: Mod: GY | Performed by: PHYSICIAN ASSISTANT

## 2020-02-12 PROCEDURE — 99232 SBSQ HOSP IP/OBS MODERATE 35: CPT | Performed by: INTERNAL MEDICINE

## 2020-02-12 RX ADMIN — FLUTICASONE FUROATE AND VILANTEROL TRIFENATATE 1 PUFF: 200; 25 POWDER RESPIRATORY (INHALATION) at 08:32

## 2020-02-12 RX ADMIN — MULTIPLE VITAMINS W/ MINERALS TAB 1 TABLET: TAB at 08:24

## 2020-02-12 RX ADMIN — PROPRANOLOL HYDROCHLORIDE 10 MG: 10 TABLET ORAL at 08:24

## 2020-02-12 RX ADMIN — NICOTINE 1 PATCH: 14 PATCH, EXTENDED RELEASE TRANSDERMAL at 08:24

## 2020-02-12 RX ADMIN — SPIRONOLACTONE 50 MG: 25 TABLET ORAL at 08:24

## 2020-02-12 RX ADMIN — DICYCLOMINE HYDROCHLORIDE 20 MG: 20 TABLET ORAL at 15:58

## 2020-02-12 RX ADMIN — MIRTAZAPINE 15 MG: 15 TABLET, FILM COATED ORAL at 21:11

## 2020-02-12 RX ADMIN — DEXTRAN 70 AND HYPROMELLOSE 2910 2 DROP: 1; 3 SOLUTION/ DROPS OPHTHALMIC at 21:11

## 2020-02-12 RX ADMIN — DICYCLOMINE HYDROCHLORIDE 20 MG: 20 TABLET ORAL at 21:11

## 2020-02-12 RX ADMIN — DICYCLOMINE HYDROCHLORIDE 20 MG: 20 TABLET ORAL at 06:55

## 2020-02-12 RX ADMIN — PANTOPRAZOLE SODIUM 40 MG: 40 TABLET, DELAYED RELEASE ORAL at 06:55

## 2020-02-12 RX ADMIN — FOLIC ACID 1 MG: 1 TABLET ORAL at 08:24

## 2020-02-12 RX ADMIN — LACTULOSE 10 G: 10 SOLUTION ORAL at 21:10

## 2020-02-12 RX ADMIN — LACTULOSE 10 G: 10 SOLUTION ORAL at 08:24

## 2020-02-12 RX ADMIN — DULOXETINE HYDROCHLORIDE 30 MG: 30 CAPSULE, DELAYED RELEASE ORAL at 08:24

## 2020-02-12 RX ADMIN — FUROSEMIDE 20 MG: 20 TABLET ORAL at 08:24

## 2020-02-12 RX ADMIN — TRAZODONE HYDROCHLORIDE 200 MG: 50 TABLET ORAL at 21:11

## 2020-02-12 RX ADMIN — DICYCLOMINE HYDROCHLORIDE 20 MG: 20 TABLET ORAL at 11:12

## 2020-02-12 ASSESSMENT — ACTIVITIES OF DAILY LIVING (ADL)
ADLS_ACUITY_SCORE: 18
ADLS_ACUITY_SCORE: 16
ADLS_ACUITY_SCORE: 18
ADLS_ACUITY_SCORE: 16
ADLS_ACUITY_SCORE: 16
ADLS_ACUITY_SCORE: 18

## 2020-02-12 NOTE — PLAN OF CARE
DATE & TIME: 2/12/20 2300-0730   Cognitive Concerns/ Orientation : A&O x 4   BEHAVIOR & AGGRESSION TOOL COLOR: Green  CIWA SCORE: NA   ABNL VS/O2: VSS except mandi on room air   MOBILITY: SBA +gb; calls appropriately   PAIN MANAGMENT: Denies pain  DIET: 2 g Na+  BOWEL/BLADDER: Continent; no bm this shift   ABNL LAB/BG: None  DRAIN/DEVICES: PIV L arm saline locked   TELEMETRY RHYTHM: NA  SKIN: Skin tears R lower forearm scabbing, open to air. Nicolás lower legs and mild edema.   TESTS/PROCEDURES: None   D/C DAY/GOALS/PLACE: Discharge pending placement to TCU   OTHER IMPORTANT INFO: Baseline tremor to BUE. Abdomen distended.

## 2020-02-12 NOTE — PLAN OF CARE
PT: Attempted to see patient for PT session. Pt's dinner tray had arrived and he needed to get into the chair to eat. Pt required SBA for sit to stand and bed to chair transfer but demonstrated shakiness and dec balance.

## 2020-02-12 NOTE — PROGRESS NOTES
SW:  D:  Continue referrals for TCU placement.  Sherly Royal [facility],  will meet with patient tomorrow to update his CD assessment so that referrals can be made for post TCU.

## 2020-02-12 NOTE — PLAN OF CARE
02/11/20 1901 Blair Lopez, RN - Registered Nurse]   DATE & TIME: 02/11/2020, 3-7pm                Cognitive Concerns/ Orientation : A & O x4  BEHAVIOR & AGGRESSION TOOL COLOR: Green   CIWA SCORE: N/A  ABNL VS/O2: VSS on RA  MOBILITY: SBA w/ gait belt and walker.   PAIN MANAGMENT: reports abdomen pain 7/10, declined PRN tylenol and wanted to try oxycodone, 5 mg dose x1  DIET: 2 gm NA  BOWEL/BLADDER: continent, had few bowel movements and last one around 1100 per patient, on lactulose  ABNL LAB/BG:   DRAIN/DEVICES: PIV SL  TELEMETRY RHYTHM: N/A  SKIN: Skin tears R lower forearm scabbing, open to air. Nicolás lower leg and mild edema.   TESTS/PROCEDURES: None scheduled.   D/C DAY/GOALS/PLACE: TCU when bed placement found.  OTHER IMPORTANT INFO: Baseline tremor to BUE. Abdomen distended. Paracentesis site bandaid clean, dry and intact

## 2020-02-12 NOTE — PROGRESS NOTES
[facility]  Gastroenterology Progress Note     Jim Richard MRN# [id number]   YOB: 1955 Age: 64 year old          Assessment and Plan:   Update: Patient had stable hemoglobin and platelets.  Abdomen soft. No stools this a.m. NO report of melena. Creatinine has increased to 1.44 from baseline of 1.20.     Active Problems:   Alcohol withdrawal (H)  Alcohol liver cirrhosis with ascites and esophageal varices  Jim Richard is a pleasant 64 year old male with history of alcohol abuse, alcoholic liver cirrhosis with ascites and esophageal varices with diarrhea for 3 days. GI consulted for continuation of care and history of cirrhosis. Patient has had exacerbation of symptoms associated with excessive alcohol intake. Underwent ultrasound guided paracentesis and had 6750 mL of serous fluid removed. Hemoglobin stable and at baseline. Has been noncompliant with medication. Has been started on propranolol with history of esophageal varices. Has had lactulose held with diarrhea and stools incontinence and diarrhea. Having 1-2 stools a day and now more formed. Has had elevation in creatinine with addition of diuretics. Will decrease dosage.     - Recommend to consider inpatient alcohol treatment given 4 admissions in last year for alcohol related problems  - Continue with spironolactone to 50 mg and furosemide 20 mg daily and monitor daily BMP  - May restart lactulose if stools less than 3 a day.- will start with one tablespoon a day 10g/15mL  - Continue with pantoprazole and propranolol  - Continue on dicyclomine for abdominal cramping          Alcoholic cirrhosis of liver with ascites (H)  Alcoholic intoxication with complication (H)  Suicidal ideation      Interval History:   denies chest pain, denies shortness of breath, denies abdominal pain, pain is controlled, alert, oriented to person, place and time, has had a bowel movement in the last 24 hours and doing well; no cp, sob, n/v/d,  or abd pain.              Review of Systems:   C: NEGATIVE for fever, chills, change in weight  E/M: NEGATIVE for ear, mouth and throat problems  R: NEGATIVE for significant cough or SOB  CV: NEGATIVE for chest pain, palpitations or peripheral edema             Medications:   I have reviewed this patient's current medications    dicyclomine  20 mg Oral 4x Daily AC & HS     DULoxetine  30 mg Oral Daily     fluticasone-vilanterol  1 puff Inhalation Daily     folic acid  1 mg Oral Daily     furosemide  20 mg Oral Daily     lactulose  10 g Oral BID     mirtazapine  15 mg Oral At Bedtime     multivitamin w/minerals  1 tablet Oral Daily     nicotine  1 patch Transdermal Daily     nicotine   Transdermal Q8H     pantoprazole  40 mg Oral QAM AC     propranolol  10 mg Oral BID     spironolactone  50 mg Oral Daily     traZODone  200 mg Oral At Bedtime                  Physical Exam:   Vitals were reviewed  Vital Signs with Ranges  Temp:  [97.3  F (36.3  C)-99  F (37.2  C)] 97.3  F (36.3  C)  Heart Rate:  [53-65] 65  Resp:  [16] 16  BP: (102-122)/(54-70) 115/67  SpO2:  [95 %-97 %] 95 %  I/O last 3 completed shifts:  In: 480 [P.O.:480]  Out: -   Constitutional: healthy, alert and no distress   Cardiovascular: negative, PMI normal. No lifts, heaves, or thrills. RRR. No murmurs, clicks gallops or rub  Respiratory: negative, Percussion normal. Good diaphragmatic excursion. Lungs clear  Head: Normocephalic. No masses, lesions, tenderness or abnormalities  Neck: Neck supple. No adenopathy. Thyroid symmetric, normal size,, Carotids without bruits.  Abdomen: Abdomen soft, non-tender. BS normal. No masses, organomegaly, positive findings: distended- tympanic.            Data:   I reviewed the patient's new clinical lab test results.   Recent Labs   Lab Test 02/08/20  0735 02/07/20  0758 02/06/20  1413 02/06/20  0946 02/06/20  0805  02/03/20  0218   WBC 3.3* 3.4*  --   --  3.9*   < > 7.9   HGB 8.8* 8.2* 8.6*  --  8.4*   < > 9.6*   MCV 96  97  --   --  97   < > 97   PLT 67* 58*  --   --  49*   < > 153   INR  --  1.36*  --  1.29*  --   --  1.18*    < > = values in this interval not displayed.     Recent Labs   Lab Test 02/12/20  0944 02/10/20  0728 02/09/20  0810   POTASSIUM 4.5 3.7 3.7   CHLORIDE 103 102 100   CO2 23 26 26   BUN 27 25 24   ANIONGAP 7 6 7     Recent Labs   Lab Test 02/07/20  0758 02/04/20  0800 02/03/20  0218  01/11/20  1730  11/13/19  1858 11/13/19  1849  02/13/19  2340  09/25/14  0510   ALBUMIN 2.2* 2.3* 2.7*   < >  --    < > 3.0*  --    < > 3.1*   < >  --    BILITOTAL 1.0 2.5* 1.6*   < >  --    < > 1.0  --    < > 0.4   < >  --    ALT 19 22 26   < >  --    < > 24  --    < > 41   < >  --    AST 37 53* 61*   < >  --    < > 58*  --    < > 89*   < >  --    PROTEIN  --   --   --   --  Negative  --   --  30*  --   --   --  Negative   LIPASE  --   --  369  --   --   --  499*  --   --  536*   < >  --     < > = values in this interval not displayed.       I reviewed the patient's new imaging results.    All laboratory data reviewed  All imaging studies reviewed by me.    Gloria De Leon PA-C,  2/12/2020  [facility]  Office : [phone]  Cell: [phone] (Dr. Shaw)  Cell: [phone] (Gloria De Leon PA-C)

## 2020-02-12 NOTE — PROGRESS NOTES
[facility]    Hospitalist Progress Note      Assessment & Plan   Jim Richard is a 64 year old male who was admitted on 2/3/2020. His history is significant for cirrhosis (w/ ascites, esophageal varices), EtOH use disorder, depression/anxiety, COPD, sleep apnea, anemia, admitted for EtOH intoxication and SI.     He was recently hospitalized at [facility] for weakness in EtOH withdrawal, and was seen by GI, CD, and Psych. It appears the initial plan was for him to go to TCU w/ close GI f/u to adjust his diuretics, but he refused TCU and went home. Since going home he has been drinking, medication compliance sporadic, with worsening abdominal ascites. He came in for SI in the setting of drinking.      # Alcohol Withdrawal: has had 4 alcohol-related admission in the past year  - appreciate CD assistance: patient will need to go to TCU first, then set up outpatient rehab  - appreciate Psychiatry consult, no indication to pursue commitment at this time  - cont daily thiamine, folate, MVI  - cont PT/OT: rec TCU     # Alcoholic Cirrhosis with Esophageal Varices  - appreciate Gastroenterology assistance  - cont Spironolactone 50mg PO daily  - cont Lasix 20mg PO daily  - cont Propranolol 10mg PO BID  - cont Lactulose 10g PO BID, titrate to 2-3 BMs per day     # Anemia with hx of Esophageal Varices  - appreciate GI assistance     # Depression with Suicidal Ideation: reported SI prior to hospitalization although no further SI noted after arrival  - appreciate Psychiatry assistance  - cont Duloxetine, Mirtazapine, Trazodone, prn Seroquel     # FEN  - low Na diet     # Dispo  - pending TCU placement followed by outpatient rehab     DVT Prophylaxis: Pneumatic Compression Devices  Code Status: DNR/DNI  Expected discharge: 2 - 3 days, recommended to transitional care unit once bed is available.    Bang Orellana MD  Text page (7am - 6pm)    Interval History   - feels well today, had 3-4 BMs yesterday, tolerating  diet  - 10+ ROS otherwise negative    -Data reviewed today: I reviewed all new labs and imaging results over the last 24 hours.    Physical Exam   Temp: 97.3  F (36.3  C) Temp src: Axillary BP: 115/67   Heart Rate: 65 Resp: 16 SpO2: 95 % O2 Device: None (Room air)    Vitals:    02/03/20 0800   Weight: 89.6 kg (197 lb 8.5 oz)     Vital Signs with Ranges  Temp:  [97.3  F (36.3  C)-99  F (37.2  C)] 97.3  F (36.3  C)  Heart Rate:  [53-65] 65  Resp:  [16] 16  BP: (102-122)/(54-70) 115/67  SpO2:  [95 %-97 %] 95 %  I/O last 3 completed shifts:  In: 480 [P.O.:480]  Out: -     Constitutional: Awake, alert, cooperative, no apparent distress.  Respiratory: Clear to auscultation bilaterally, no crackles or wheezing.  Cardiovascular: Regular rate and rhythm, normal S1 and S2, and no murmur noted.  GI: Soft, non-distended, non-tender, normal bowel sounds.  Skin: No rashes, no cyanosis, no edema.  Musculoskeletal: No joint swelling, erythema or tenderness.  Neurologic: Cranial nerves 2-12 intact, normal strength and sensation.  Psychiatric: Alert, oriented to person, place and time, no obvious anxiety or depression.    Medications       dicyclomine  20 mg Oral 4x Daily AC & HS     DULoxetine  30 mg Oral Daily     fluticasone-vilanterol  1 puff Inhalation Daily     folic acid  1 mg Oral Daily     furosemide  20 mg Oral Daily     lactulose  10 g Oral BID     mirtazapine  15 mg Oral At Bedtime     multivitamin w/minerals  1 tablet Oral Daily     nicotine  1 patch Transdermal Daily     nicotine   Transdermal Q8H     pantoprazole  40 mg Oral QAM AC     propranolol  10 mg Oral BID     spironolactone  50 mg Oral Daily     traZODone  200 mg Oral At Bedtime       Data   Recent Labs   Lab 02/12/20  0944 02/10/20  0728 02/09/20  0810 02/08/20  0735  02/07/20  0758 02/06/20  1413 02/06/20  0946 02/06/20  0805   WBC  --   --   --  3.3*  --  3.4*  --   --  3.9*   HGB  --   --   --  8.8*  --  8.2* 8.6*  --  8.4*   MCV  --   --   --  96  --  97   --   --  97   PLT  --   --   --  67*  --  58*  --   --  49*   INR  --   --   --   --   --  1.36*  --  1.29*  --     134 133 136  --  134  --   --   --    POTASSIUM 4.5 3.7 3.7 3.8   < > 3.2*  --   --   --    CHLORIDE 103 102 100 105  --  102  --   --   --    CO2 23 26 26 29  --  25  --   --   --    BUN 27 25 24 24  --  24  --   --   --    CR 1.44* 1.38* 1.38* 1.26*  --  1.20  --   --   --    ANIONGAP 7 6 7 2*  --  7  --   --   --    KEV 8.8 8.6 8.7 8.4*  --  8.2*  --   --   --    * 99 94 95  --  90  --   --   --    ALBUMIN  --   --   --   --   --  2.2*  --   --   --    PROTTOTAL  --   --   --   --   --  5.6*  --   --   --    BILITOTAL  --   --   --   --   --  1.0  --   --   --    ALKPHOS  --   --   --   --   --  194*  --   --   --    ALT  --   --   --   --   --  19  --   --   --    AST  --   --   --   --   --  37  --   --   --     < > = values in this interval not displayed.       No results found for this or any previous visit (from the past 24 hour(s)).

## 2020-02-13 ENCOUNTER — APPOINTMENT (OUTPATIENT)
Dept: PHYSICAL THERAPY | Facility: CLINIC | Age: 65
DRG: 433 | End: 2020-02-13
Payer: MEDICARE

## 2020-02-13 ENCOUNTER — APPOINTMENT (OUTPATIENT)
Dept: OCCUPATIONAL THERAPY | Facility: CLINIC | Age: 65
DRG: 433 | End: 2020-02-13
Payer: MEDICARE

## 2020-02-13 LAB
ANION GAP SERPL CALCULATED.3IONS-SCNC: 3 MMOL/L (ref 3–14)
BUN SERPL-MCNC: 27 MG/DL (ref 7–30)
CALCIUM SERPL-MCNC: 9 MG/DL (ref 8.5–10.1)
CHLORIDE SERPL-SCNC: 105 MMOL/L (ref 94–109)
CO2 SERPL-SCNC: 26 MMOL/L (ref 20–32)
CREAT SERPL-MCNC: 1.45 MG/DL (ref 0.66–1.25)
GFR SERPL CREATININE-BSD FRML MDRD: 50 ML/MIN/{1.73_M2}
GLUCOSE SERPL-MCNC: 102 MG/DL (ref 70–99)
POTASSIUM SERPL-SCNC: 4.4 MMOL/L (ref 3.4–5.3)
SODIUM SERPL-SCNC: 134 MMOL/L (ref 133–144)

## 2020-02-13 PROCEDURE — 25000132 ZZH RX MED GY IP 250 OP 250 PS 637: Mod: GY | Performed by: HOSPITALIST

## 2020-02-13 PROCEDURE — 97116 GAIT TRAINING THERAPY: CPT | Mod: GP

## 2020-02-13 PROCEDURE — 25000132 ZZH RX MED GY IP 250 OP 250 PS 637: Mod: GY | Performed by: PSYCHIATRY & NEUROLOGY

## 2020-02-13 PROCEDURE — 99231 SBSQ HOSP IP/OBS SF/LOW 25: CPT | Performed by: INTERNAL MEDICINE

## 2020-02-13 PROCEDURE — 40000007 ZZH STATISTIC ADULT CD FACE TO FACE-NO CHRG

## 2020-02-13 PROCEDURE — 25000132 ZZH RX MED GY IP 250 OP 250 PS 637: Mod: GY | Performed by: PHYSICIAN ASSISTANT

## 2020-02-13 PROCEDURE — 25000132 ZZH RX MED GY IP 250 OP 250 PS 637: Mod: GY | Performed by: INTERNAL MEDICINE

## 2020-02-13 PROCEDURE — 36415 COLL VENOUS BLD VENIPUNCTURE: CPT | Performed by: PHYSICIAN ASSISTANT

## 2020-02-13 PROCEDURE — 97530 THERAPEUTIC ACTIVITIES: CPT | Mod: GO | Performed by: OCCUPATIONAL THERAPY ASSISTANT

## 2020-02-13 PROCEDURE — 80048 BASIC METABOLIC PNL TOTAL CA: CPT | Performed by: PHYSICIAN ASSISTANT

## 2020-02-13 PROCEDURE — 97110 THERAPEUTIC EXERCISES: CPT | Mod: GP

## 2020-02-13 PROCEDURE — 12000000 ZZH R&B MED SURG/OB

## 2020-02-13 PROCEDURE — 97535 SELF CARE MNGMENT TRAINING: CPT | Mod: GO | Performed by: OCCUPATIONAL THERAPY ASSISTANT

## 2020-02-13 RX ORDER — LANOLIN ALCOHOL/MO/W.PET/CERES
100 CREAM (GRAM) TOPICAL DAILY
Status: DISCONTINUED | OUTPATIENT
Start: 2020-02-13 | End: 2020-02-25

## 2020-02-13 RX ADMIN — FUROSEMIDE 20 MG: 20 TABLET ORAL at 10:12

## 2020-02-13 RX ADMIN — FOLIC ACID 1 MG: 1 TABLET ORAL at 10:11

## 2020-02-13 RX ADMIN — DICYCLOMINE HYDROCHLORIDE 20 MG: 20 TABLET ORAL at 10:13

## 2020-02-13 RX ADMIN — SPIRONOLACTONE 50 MG: 25 TABLET ORAL at 10:12

## 2020-02-13 RX ADMIN — THIAMINE HCL TAB 100 MG 100 MG: 100 TAB at 10:11

## 2020-02-13 RX ADMIN — TRAZODONE HYDROCHLORIDE 200 MG: 50 TABLET ORAL at 21:27

## 2020-02-13 RX ADMIN — PROPRANOLOL HYDROCHLORIDE 10 MG: 10 TABLET ORAL at 10:12

## 2020-02-13 RX ADMIN — FLUTICASONE FUROATE AND VILANTEROL TRIFENATATE 1 PUFF: 200; 25 POWDER RESPIRATORY (INHALATION) at 10:13

## 2020-02-13 RX ADMIN — PANTOPRAZOLE SODIUM 40 MG: 40 TABLET, DELAYED RELEASE ORAL at 10:12

## 2020-02-13 RX ADMIN — DICYCLOMINE HYDROCHLORIDE 20 MG: 20 TABLET ORAL at 12:56

## 2020-02-13 RX ADMIN — DULOXETINE HYDROCHLORIDE 30 MG: 30 CAPSULE, DELAYED RELEASE ORAL at 10:11

## 2020-02-13 RX ADMIN — DICYCLOMINE HYDROCHLORIDE 20 MG: 20 TABLET ORAL at 15:49

## 2020-02-13 RX ADMIN — LACTULOSE 10 G: 10 SOLUTION ORAL at 10:13

## 2020-02-13 RX ADMIN — DICYCLOMINE HYDROCHLORIDE 20 MG: 20 TABLET ORAL at 21:26

## 2020-02-13 RX ADMIN — MIRTAZAPINE 15 MG: 15 TABLET, FILM COATED ORAL at 21:27

## 2020-02-13 RX ADMIN — MULTIPLE VITAMINS W/ MINERALS TAB 1 TABLET: TAB at 10:12

## 2020-02-13 RX ADMIN — NICOTINE 1 PATCH: 14 PATCH, EXTENDED RELEASE TRANSDERMAL at 10:15

## 2020-02-13 RX ADMIN — DEXTRAN 70 AND HYPROMELLOSE 2910 2 DROP: 1; 3 SOLUTION/ DROPS OPHTHALMIC at 21:27

## 2020-02-13 RX ADMIN — PROPRANOLOL HYDROCHLORIDE 10 MG: 10 TABLET ORAL at 21:29

## 2020-02-13 RX ADMIN — LACTULOSE 10 G: 10 SOLUTION ORAL at 21:27

## 2020-02-13 ASSESSMENT — ACTIVITIES OF DAILY LIVING (ADL)
ADLS_ACUITY_SCORE: 16

## 2020-02-13 NOTE — PROGRESS NOTES
MD Notification    Notified Person: MD    Notified Person Name:Dr Flores    Notification Date/Time:2210    Notification Interaction:phone call    Purpose of Notification:HR 48-50, need parameters for Inderal    Orders Received:awaiting for call back    Comments: Order received-Ok to hold Inderal tonight

## 2020-02-13 NOTE — CONSULTS
2/13/20 chem dep consult completed.      Met with patient and updated his evaluation from 10/27/20 via telemedicine.  Referrals sent to [facility] for placement following TCU stay.    It should be noted that patient has had 16 ED and/or hospital admission sicnce 01/2019 and has not followed through with any recommendations or stanislav able to maintain abstinence.  A petition for commitment should be considered in the future or identifying that a harm reduction model may be best suited for patient as abstinence may not be an attainable goal for him.    Sherly Royal, [facility]  [phone]

## 2020-02-13 NOTE — PLAN OF CARE
DATE & TIME: 02/13/2020 6678-8563  Cognitive Concerns/ Orientation : A&O x 4; calm and cooperative   BEHAVIOR & AGGRESSION TOOL COLOR: Green   CIWA SCORE: Discontinued   ABNL VS/O2: VSS on RA, Armando at times   MOBILITY: SBA; patient steady on feet   PAIN MANAGMENT: Denied pain this shift  DIET: 2 g Na+ diet; tolerating well  BOWEL/BLADDER: BS active x 4; Continent of bowel and bladder up to bathroom  ABNL LAB/BG: Creatinine= 1.44  DRAIN/DEVICES: PIV SL   TELEMETRY RHYTHM: N/A  SKIN: Dry, pale, flaky, scattered bruising on URE and ULE; abrasions on upper extremities.   TESTS/PROCEDURES: N/A  D/C DAY/GOALS/PLACE: pending discharge to TCU, SW following   OTHER IMPORTANT INFO: Pt.on scheduled lactulose. Nicotine patch in place on left deltoid.

## 2020-02-13 NOTE — PROGRESS NOTES
SW:  D:  Patient has been declined by all [facility] due to his current history of ETOH use and history of a suicide ideation.  [facility] clinically accepted however they do not have an appropriate vacancy on their TCU.  Renetta in admissions at [facility] will call if this changes.  [facility] have also declined due to ETOH.  Additional referrals sent to [facility].  There are many TCU's that routinely decline patient's who have an active ETOH use prior to hospitalization.  [facility] will not send to these facilities.  Patient's CD assessment was updated today by Sherly Nunn and will be sent to [facility]

## 2020-02-13 NOTE — PLAN OF CARE
Discharge Planner PT   Patient plan for discharge: TCU, then residential CD treatment  Current status: Pt transferred supine to sit on EOB IND and STS with SBA. Pt needed to use restroom, pt IND for toileting and hand washing. Gait training 375' with no AD and CGA. Pt performed 10 steps x1 with 1 rail and CGA. Following gait pt left sitting in chair with alarm on and needs in reach.    Barriers to return to prior living situation: current level of assist, high falls risk  Recommendations for discharge: TCU per plan established by the PT.   Rationale for recommendations: Patient will benefit from continued skilled therapies in TCU to progress safety and independence with mobility prior to returning to community setting or beginning residential CD treatment.       Entered by: Yun Kingsley 02/13/2020 10:26 AM

## 2020-02-13 NOTE — PLAN OF CARE
DATE & TIME: 9215-2921; 2/12/2020  Cognitive Concerns/ Orientation : A & O x 4; calm and cooperative   BEHAVIOR & AGGRESSION TOOL COLOR: Green   CIWA SCORE: N/A   ABNL VS/O2: HR= 51 (M.D. notified); other VSS on room air   MOBILITY: SBA; patient steady on feet   PAIN MANAGMENT: Pt. Reported no pain during shift   DIET: 2 g Na+ diet; tolerating well  BOWEL/BLADDER: BS active x 4; Continent of bowel and bladder.  ABNL LAB/BG: Creatinine= 1.44  DRAIN/DEVICES: PIV/SL   TELEMETRY RHYTHM: N/A  SKIN: Dry, pale, flaky, scattered bruising on URE and ULE; abrasions on upper extremities.   TESTS/PROCEDURES: N/A  D/C DAY/GOALS/PLACE: pending discharge   OTHER IMPORTANT INFO: pt.on scheduled lactulose. Pt. Reported burning in both eyes; artifical tears available for irritation. Nicotine patch in place on left deltoid.Nursing will continue to monitor and assess.       Janeth Mojica RN on 2/12/2020 at 10:59 PM

## 2020-02-13 NOTE — PLAN OF CARE
DATE & TIME: 2/13 Day                    Cognitive Concerns/ Orientation : A&Ox4   BEHAVIOR & AGGRESSION TOOL COLOR: Green  CIWA SCORE: n/a  ABNL VS/O2: VSS on RA, can be mandi, /64.  MOBILITY: SBA GB, improving.   PAIN MANAGMENT: Denies  DIET: 2 g sodium.  BOWEL/BLADDER: Continent, using BR. 1 formed stool.  ABNL LAB/BG: Creat 1.45.  DRAIN/DEVICES: PIV SL.  TELEMETRY RHYTHM: n/a  SKIN: Bruising, scabs, abrasion on LUE.  TESTS/PROCEDURES: n/a  D/C DAY/GOALS/PLACE: To TCU and then CD treatment after. SW following, waiting on bed.  OTHER IMPORTANT INFO: Pt met with CD counselor today.

## 2020-02-13 NOTE — PROGRESS NOTES
[facility]    Hospitalist Progress Note      Assessment & Plan     Jim Richard is a 64 year old male who was admitted on 2/3/2020. His history is significant for cirrhosis (w/ ascites, esophageal varices), EtOH use disorder, depression/anxiety, COPD, sleep apnea, anemia, admitted for EtOH intoxication and SI.    He was recently hospitalized at [facility] for weakness in EtOH withdrawal, and was seen by GI, CD, and Psych. It appears the initial plan was for him to go to TCU w/ close GI f/u to adjust his diuretics, but he refused TCU and went home. Since going home he has been drinking, medication compliance sporadic, with worsening abdominal ascites. He came in for SI in the setting of drinking.      Alcohol Withdrawal: has had 4 alcohol-related admission in the past year  - CD recs: patient will need to go to TCU first, then set up outpatient rehab  - Psychiatry recs: no indication to pursue commitment at this time  - cont daily thiamine, folate, MVI  - PT/OT: rec TCU       Alcoholic Cirrhosis with Esophageal Varices  - GI recs:   - Spironolactone 50mg PO daily   - Lasix 20mg PO daily   - Propranolol 10mg PO BID   - Pantoprazole   - Lactulose 10g PO BID, titrate to 2-3 BMs per day   - dicyclomine for abd cramping     Anemia with hx of Esophageal Varices  - f/u GI recs     Depression with Suicidal Ideation   Reported SI prior to hospitalization although no further SI noted after arrival  - Psychiatry assistance  - cont Duloxetine, Mirtazapine, Trazodone, prn Seroquel    FEN  - low Na diet     Dispo  - pending TCU placement followed by outpatient rehab        DVT Prophylaxis: Pneumatic Compression Devices  Code Status: DNR/DNI  Expected discharge: 2-3 days, recommended to TCU once bed is available    Efrain Aceves MD  Text Page (7am - 6pm, M-F)    Interval History   Feels well today, no new symptoms. Leg swelling improving.    -Data reviewed today: I reviewed all new labs and imaging results over  the last 24 hours.      Physical Exam   Temp: 98.3  F (36.8  C) Temp src: Oral BP: 109/64   Heart Rate: 57 Resp: 16 SpO2: 97 % O2 Device: None (Room air)    Vitals:    02/03/20 0800   Weight: 89.6 kg (197 lb 8.5 oz)     Vital Signs with Ranges  Temp:  [97.8  F (36.6  C)-98.6  F (37  C)] 98.3  F (36.8  C)  Heart Rate:  [48-57] 57  Resp:  [16-18] 16  BP: (109-135)/(59-73) 109/64  SpO2:  [96 %-97 %] 97 %  I/O last 3 completed shifts:  In: 1160 [P.O.:1160]  Out: -       General: well-appearing, NAD  HEENT: NC/AT, moist mucus membranes  Heart: RRR no m/r/g  Lungs: CTAB no crackles or wheezes  Abdomen: NABS, non-tender, mildly distended; no rebound or guarding; no HSM or masses.  Extremities: warm, well-perfused. 2+ bilateral ankle pitting edema.  Neuro: CN II-XII grossly intact, moving extremities spontaneously  Psych: normal mood and affect    Medications       dicyclomine  20 mg Oral 4x Daily AC & HS     DULoxetine  30 mg Oral Daily     fluticasone-vilanterol  1 puff Inhalation Daily     folic acid  1 mg Oral Daily     furosemide  20 mg Oral Daily     lactulose  10 g Oral BID     mirtazapine  15 mg Oral At Bedtime     multivitamin w/minerals  1 tablet Oral Daily     nicotine  1 patch Transdermal Daily     nicotine   Transdermal Q8H     pantoprazole  40 mg Oral QAM AC     propranolol  10 mg Oral BID     spironolactone  50 mg Oral Daily     traZODone  200 mg Oral At Bedtime     vitamin B1  100 mg Oral Daily       Data   Recent Labs   Lab 02/12/20  0944 02/10/20  0728 02/09/20  0810 02/08/20  0735  02/07/20  0758  02/06/20  1413 02/06/20  0946   WBC  --   --   --  3.3*  --  3.4*  --   --   --    HGB  --   --   --  8.8*  --  8.2*  --  8.6*  --    MCV  --   --   --  96  --  97  --   --   --    PLT  --   --   --  67*  --  58*  --   --   --    INR  --   --   --   --   --  1.36*  --   --  1.29*    134 133 136  --  134   < >  --   --    POTASSIUM 4.5 3.7 3.7 3.8   < > 3.2*  --   --   --    CHLORIDE 103 102 100 105  --   102   < >  --   --    CO2 23 26 26 29  --  25   < >  --   --    BUN 27 25 24 24  --  24   < >  --   --    CR 1.44* 1.38* 1.38* 1.26*  --  1.20   < >  --   --    ANIONGAP 7 6 7 2*  --  7   < >  --   --    KEV 8.8 8.6 8.7 8.4*  --  8.2*   < >  --   --    * 99 94 95  --  90   < >  --   --    ALBUMIN  --   --   --   --   --  2.2*  --   --   --    PROTTOTAL  --   --   --   --   --  5.6*  --   --   --    BILITOTAL  --   --   --   --   --  1.0  --   --   --    ALKPHOS  --   --   --   --   --  194*  --   --   --    ALT  --   --   --   --   --  19  --   --   --    AST  --   --   --   --   --  37  --   --   --     < > = values in this interval not displayed.       No results found for this or any previous visit (from the past 24 hour(s)).

## 2020-02-13 NOTE — PLAN OF CARE
Discharge Planner OT   Patient plan for discharge: stated either chemical dependency rehab or TCU  Current status: Pt in chair upon arrival, agreeable to working with therapy. Pt completed LE dressing while seated, pt able to doff/jarocho socks with SBA, donned pants with SBA, pt completed sit to stand with FWW and CGA, pt amb to/from the bathroom with FWW and SBA, pt completed toilet on/off transfer with use of grab bars and SBA, pt amb in the hallways for ~110 feet for increased endurance for ADL/IADLs, pt returned to bathroom, completed 2 g/h tasks at EOS with SBA.   Barriers to return to prior living situation: weakness, impaired cognition/safety, falls risk  Recommendations for discharge: TCU per plan established by the Occupational Therapist  Rationale for recommendations: Pt. would benfit from continued skilled OT intervention to assist pt. to return to PLOF/achieve maximal safety/indep. W/ I/ADL's       Entered by: Ursula Jones 02/13/2020 3:20 PM

## 2020-02-13 NOTE — PROGRESS NOTES
Type of service:  Comprehensive Assessment - Update  Time Service Began:  9:21am  Time Service Ended:  9:46am  Originating Location (pt. Location):  [facility] - [address], [place] [address]    Distant Location (provider location):  Behavioral Health Assessment [facility]   Reason for Televisit:  Provider off-site  Mode of Communication:  Video Conference via [employer]  As the provider I attest to compliance with applicable laws and regulations related to telemedicine.  SYLVIE Sarmiento      Assessment and Placement Summary Update     Patient name:   Jim Richard   Patient phone: [phone] (home) none (work)   Last #:   [id number]   : 1955      PMI #: This patient does not have a PMI number.   Patient address:   [address] [place] [id number]     Date of Original Assessment / Last Update: 10/27/19 Update Assessment Date: 2020   Updated by:   SYLVIE Sarmiento    phone number: [phone]   Referred by:   [facility] / phone number: [phone]   Referral to:   [facility]   NPI: NPI [id number]   Summary:  This patient had a Rule 25 Assessment on 10/27/19 at [facility] completed by AMADO Hammer, [facility].  INSIDE: The patient's Rule 25 Assessment completed on 10/27/19 is in the patient's electronic medical record in Epic in the Chart Review section under the Notes/Trans Tab.    Reason for today's update: The history was obtained from reviewing medical records and staff ordered cd consult:           Substance Use History Update:           X = Primary Drug Used   Age of First Use Most Recent Pattern of Use and Duration   Need enough information to show pattern (both frequency and amounts) and to show tolerance for each chemical that has a diagnosis   Date of last use and time, if needed   Withdrawal Potential? Requiring special care Method of use  (oral, smoked, snort, IV, etc)   x   Alcohol     15  1-1.5pints/day   2/3/20 no oral      Marijuana/  Hashish   17 none 30+ years ago no smoke      Cocaine/Crack     20's None 20's no snort      Meth/  Amphetamines   No use          Heroin     No use          Other Opiates/  Synthetics   No use          Inhalants     No use          Benzodiazepines     No use          Hallucinogens     No use          Barbiturates/  Sedatives/  Hypnotics No use          Over-the-Counter Drugs   No use          Other     No use          Nicotine     15 Pack/day 2/3/20 no smoke     Dimension: Severity Rating/ Reason for Changes from Previous Assessment:  Dimension I: Acute intoxication/Withdrawal potential     Previous ratin Current ratin   Comments:   Patient had a BAL of 0.39 on admission.  He did require withdrawal protocols.  This day was alert and oriented.     Dimension II: Biomedical Conditions and Complications     Previous ratin Current ratin   Comments:   See physician H&P for full medical history and current medications administered.  Patient does not have any health issues that would interfere with his ability to participate in treatment programming.     Dimension III: Emotional/Behavioral/Cognitive     Previous ratin Current ratin   Comments:   Patient reports depression and anxiety, and no current mental health providers.  He denies any suicidal ideation. He reports anxiety is the reason he struggles with establishing and maintaining abstinence.  Patient was seen by psychiatry, please refer to psychiatrist notes for further mental health assessment information.     Dimension IV: Readiness for Change     Previous rating:   3 Current ratin   Comments:   Patient verbalizes a desire to establish abstinence but has lacked follow through with previous recommendations and placement.      Dimension V: Relapse/Continued Use/Continued problem potential     Previous ratin Current ratin   Comments:   Patient has a history of  treatment and in past year multiple hospitalizations related to alcohol use and health issues.  He has not been able to maintain any abstinence following discharge to home and needs to establish longer period of abstinence while in a controlled environment to continue building motivation for abstinence and daily sober living skills.     Dimension VI: Recovery environment    Previous ratin Current rating:   3   Comments:   Patient reports he lives with a roommate who does not drink and is sober.  He has supportive family members as well.  He is not working and lacks any meaningful activity.       Summary of Assessment Update and Recommendations:   What was the outcome of last referral?  Recommendation to [facility] or [facility]     Reason for changes in the Risk Description since last assessment? Patient has not followed through with recommendations and continues to drink despite interventions.  His motivation for sobriety is questionable being he has access to treatment and support but does not utilize.     Recommendation and rationale for current request and significant issues that need to be addressed:    Patient is recommended to attend inpatient treatment at [facility].    Patient has limited chance of success remaining sober without further inpatient treatment as he has had 16 ED or admissions to hospital related to his alcohol use.

## 2020-02-14 ENCOUNTER — APPOINTMENT (OUTPATIENT)
Dept: OCCUPATIONAL THERAPY | Facility: CLINIC | Age: 65
DRG: 433 | End: 2020-02-14
Payer: MEDICARE

## 2020-02-14 PROCEDURE — 99231 SBSQ HOSP IP/OBS SF/LOW 25: CPT | Performed by: INTERNAL MEDICINE

## 2020-02-14 PROCEDURE — 25000132 ZZH RX MED GY IP 250 OP 250 PS 637: Mod: GY | Performed by: HOSPITALIST

## 2020-02-14 PROCEDURE — 12000000 ZZH R&B MED SURG/OB

## 2020-02-14 PROCEDURE — 25000132 ZZH RX MED GY IP 250 OP 250 PS 637: Mod: GY | Performed by: INTERNAL MEDICINE

## 2020-02-14 PROCEDURE — 25000132 ZZH RX MED GY IP 250 OP 250 PS 637: Mod: GY | Performed by: PSYCHIATRY & NEUROLOGY

## 2020-02-14 PROCEDURE — 97530 THERAPEUTIC ACTIVITIES: CPT | Mod: GO | Performed by: OCCUPATIONAL THERAPY ASSISTANT

## 2020-02-14 PROCEDURE — 97535 SELF CARE MNGMENT TRAINING: CPT | Mod: GO | Performed by: OCCUPATIONAL THERAPY ASSISTANT

## 2020-02-14 PROCEDURE — 25000132 ZZH RX MED GY IP 250 OP 250 PS 637: Mod: GY | Performed by: PHYSICIAN ASSISTANT

## 2020-02-14 RX ADMIN — PROPRANOLOL HYDROCHLORIDE 10 MG: 10 TABLET ORAL at 07:58

## 2020-02-14 RX ADMIN — DULOXETINE HYDROCHLORIDE 30 MG: 30 CAPSULE, DELAYED RELEASE ORAL at 07:59

## 2020-02-14 RX ADMIN — MULTIPLE VITAMINS W/ MINERALS TAB 1 TABLET: TAB at 07:58

## 2020-02-14 RX ADMIN — LACTULOSE 10 G: 10 SOLUTION ORAL at 22:18

## 2020-02-14 RX ADMIN — DICYCLOMINE HYDROCHLORIDE 20 MG: 20 TABLET ORAL at 22:13

## 2020-02-14 RX ADMIN — NICOTINE 1 PATCH: 14 PATCH, EXTENDED RELEASE TRANSDERMAL at 07:59

## 2020-02-14 RX ADMIN — DICYCLOMINE HYDROCHLORIDE 20 MG: 20 TABLET ORAL at 17:18

## 2020-02-14 RX ADMIN — FUROSEMIDE 20 MG: 20 TABLET ORAL at 07:59

## 2020-02-14 RX ADMIN — LACTULOSE 10 G: 10 SOLUTION ORAL at 07:57

## 2020-02-14 RX ADMIN — THIAMINE HCL TAB 100 MG 100 MG: 100 TAB at 07:58

## 2020-02-14 RX ADMIN — DICYCLOMINE HYDROCHLORIDE 20 MG: 20 TABLET ORAL at 11:47

## 2020-02-14 RX ADMIN — FOLIC ACID 1 MG: 1 TABLET ORAL at 07:58

## 2020-02-14 RX ADMIN — MIRTAZAPINE 15 MG: 15 TABLET, FILM COATED ORAL at 22:14

## 2020-02-14 RX ADMIN — PANTOPRAZOLE SODIUM 40 MG: 40 TABLET, DELAYED RELEASE ORAL at 06:33

## 2020-02-14 RX ADMIN — SPIRONOLACTONE 50 MG: 25 TABLET ORAL at 07:58

## 2020-02-14 RX ADMIN — FLUTICASONE FUROATE AND VILANTEROL TRIFENATATE 1 PUFF: 200; 25 POWDER RESPIRATORY (INHALATION) at 08:04

## 2020-02-14 RX ADMIN — PROPRANOLOL HYDROCHLORIDE 10 MG: 10 TABLET ORAL at 22:18

## 2020-02-14 RX ADMIN — DICYCLOMINE HYDROCHLORIDE 20 MG: 20 TABLET ORAL at 06:33

## 2020-02-14 RX ADMIN — TRAZODONE HYDROCHLORIDE 200 MG: 50 TABLET ORAL at 22:14

## 2020-02-14 ASSESSMENT — ACTIVITIES OF DAILY LIVING (ADL)
ADLS_ACUITY_SCORE: 16

## 2020-02-14 NOTE — PLAN OF CARE
Discharge Planner OT   Patient plan for discharge: not stated   Current status: Pt in chair upon arrival, agreeable to working with therapy. Pt completed sit to stand with proper hand placement and SBA, pt amb to/from the bathroom with SBA and FWW, completed transfer on/off toilet with SBA and use of grab bars. Pt was instructed to complete 5 g/h at EOS, pt completed 4/5 with no verbal cues, SBA and no LOB noted. Pt returned to chair, don/doffed socks with supervision while seated, pt completed gathering of 5 items with ww for safety within the room to demo reaching for items in low, high and forward leaning spots, pt showed no difficulty and no LOB with SBA.  Barriers to return to prior living situation: weakness, impaired cognition/safety, falls risk  Recommendations for discharge: TCU per plan established by the Occupational Therapist  Rationale for recommendations: Pt. would benfit from continued skilled OT intervention to assist pt. to return to PLOF/achieve maximal safety/indep. W/ I/ADL's       Entered by: Ursula Jones 02/14/2020 8:58 AM

## 2020-02-14 NOTE — PLAN OF CARE
DATE & TIME: 2/13 [place]   Cognitive Concerns/ Orientation : A&Ox4   BEHAVIOR & AGGRESSION TOOL COLOR: Green, calm/cooperative  ABNL VS/O2: VSS on RA, mandi at times  MOBILITY: SBA/GB/walker   PAIN MANAGMENT: denies  DIET: 2 gm NA  BOWEL/BLADDER: Continent of both, loose BM#1  ABNL LAB/BG: Creat 1.45  DRAIN/DEVICES: PIV SL.  SKIN: Bruising/scabs/abrasion on LUE.  TESTS/PROCEDURES: n/a  D/C DAY/GOALS/PLACE:  TCU placement pending and CD treatment  OTHER IMPORTANT INFO: PT/OT/SW following, Nicotine patch on, will monitor.

## 2020-02-14 NOTE — PLAN OF CARE
DATE & TIME: 2/14/2020 7-7pm      Cognitive Concerns/ Orientation : A&O x4   BEHAVIOR & AGGRESSION TOOL COLOR: Green, calm/cooperative  ABNL VS/O2: VSS on RA, mandi at times  MOBILITY: SBA with GB/walker, walked hallways X2  PAIN MANAGMENT: denies  DIET: 2 gm NA  BOWEL/BLADDER: Continent of both  ABNL LAB/BG: Creat 1.45  DRAIN/DEVICES: PIV SL.  SKIN: Bruising/scabs/abrasion on LUE.  TESTS/PROCEDURES: n/a  D/C DAY/GOALS/PLACE:  TCU placement pending and CD treatment  OTHER IMPORTANT INFO: PT/OT/SW following. Nicotine patch on.

## 2020-02-14 NOTE — PLAN OF CARE
DATE & TIME: 2/14/2020 7149-4944      Cognitive Concerns/ Orientation : A&O x4   BEHAVIOR & AGGRESSION TOOL COLOR: Green, calm/cooperative  ABNL VS/O2: VSS on RA, mandi at times  MOBILITY: SBA with GB/walker   PAIN MANAGMENT: denies  DIET: 2 gm NA  BOWEL/BLADDER: Continent of both  ABNL LAB/BG: Creat 1.45  DRAIN/DEVICES: PIV SL.  SKIN: Bruising/scabs/abrasion on LUE.  TESTS/PROCEDURES: n/a  D/C DAY/GOALS/PLACE:  TCU placement pending and CD treatment  OTHER IMPORTANT INFO: PT/OT/SW following. Nicotine patch on.

## 2020-02-15 ENCOUNTER — APPOINTMENT (OUTPATIENT)
Dept: OCCUPATIONAL THERAPY | Facility: CLINIC | Age: 65
DRG: 433 | End: 2020-02-15
Payer: MEDICARE

## 2020-02-15 ENCOUNTER — APPOINTMENT (OUTPATIENT)
Dept: PHYSICAL THERAPY | Facility: CLINIC | Age: 65
DRG: 433 | End: 2020-02-15
Payer: MEDICARE

## 2020-02-15 PROCEDURE — 25000132 ZZH RX MED GY IP 250 OP 250 PS 637: Mod: GY | Performed by: PSYCHIATRY & NEUROLOGY

## 2020-02-15 PROCEDURE — 12000000 ZZH R&B MED SURG/OB

## 2020-02-15 PROCEDURE — 97535 SELF CARE MNGMENT TRAINING: CPT | Mod: GO | Performed by: OCCUPATIONAL THERAPIST

## 2020-02-15 PROCEDURE — 97116 GAIT TRAINING THERAPY: CPT | Mod: GP | Performed by: PHYSICAL THERAPIST

## 2020-02-15 PROCEDURE — 25000132 ZZH RX MED GY IP 250 OP 250 PS 637: Mod: GY | Performed by: INTERNAL MEDICINE

## 2020-02-15 PROCEDURE — 99231 SBSQ HOSP IP/OBS SF/LOW 25: CPT | Performed by: INTERNAL MEDICINE

## 2020-02-15 PROCEDURE — 25000132 ZZH RX MED GY IP 250 OP 250 PS 637: Mod: GY | Performed by: HOSPITALIST

## 2020-02-15 PROCEDURE — 25000132 ZZH RX MED GY IP 250 OP 250 PS 637: Mod: GY | Performed by: PHYSICIAN ASSISTANT

## 2020-02-15 RX ADMIN — DICYCLOMINE HYDROCHLORIDE 20 MG: 20 TABLET ORAL at 21:28

## 2020-02-15 RX ADMIN — LACTULOSE 10 G: 10 SOLUTION ORAL at 08:23

## 2020-02-15 RX ADMIN — DULOXETINE HYDROCHLORIDE 30 MG: 30 CAPSULE, DELAYED RELEASE ORAL at 08:24

## 2020-02-15 RX ADMIN — NICOTINE 1 PATCH: 14 PATCH, EXTENDED RELEASE TRANSDERMAL at 08:23

## 2020-02-15 RX ADMIN — SPIRONOLACTONE 50 MG: 25 TABLET ORAL at 08:24

## 2020-02-15 RX ADMIN — PANTOPRAZOLE SODIUM 40 MG: 40 TABLET, DELAYED RELEASE ORAL at 06:47

## 2020-02-15 RX ADMIN — FLUTICASONE FUROATE AND VILANTEROL TRIFENATATE 1 PUFF: 200; 25 POWDER RESPIRATORY (INHALATION) at 08:22

## 2020-02-15 RX ADMIN — THIAMINE HCL TAB 100 MG 100 MG: 100 TAB at 08:24

## 2020-02-15 RX ADMIN — LACTULOSE 10 G: 10 SOLUTION ORAL at 21:30

## 2020-02-15 RX ADMIN — DICYCLOMINE HYDROCHLORIDE 20 MG: 20 TABLET ORAL at 11:22

## 2020-02-15 RX ADMIN — PROPRANOLOL HYDROCHLORIDE 10 MG: 10 TABLET ORAL at 21:28

## 2020-02-15 RX ADMIN — FOLIC ACID 1 MG: 1 TABLET ORAL at 08:25

## 2020-02-15 RX ADMIN — MIRTAZAPINE 15 MG: 15 TABLET, FILM COATED ORAL at 21:28

## 2020-02-15 RX ADMIN — MULTIPLE VITAMINS W/ MINERALS TAB 1 TABLET: TAB at 08:24

## 2020-02-15 RX ADMIN — DICYCLOMINE HYDROCHLORIDE 20 MG: 20 TABLET ORAL at 16:30

## 2020-02-15 RX ADMIN — DICYCLOMINE HYDROCHLORIDE 20 MG: 20 TABLET ORAL at 06:47

## 2020-02-15 RX ADMIN — PROPRANOLOL HYDROCHLORIDE 10 MG: 10 TABLET ORAL at 08:24

## 2020-02-15 RX ADMIN — FUROSEMIDE 20 MG: 20 TABLET ORAL at 08:24

## 2020-02-15 RX ADMIN — TRAZODONE HYDROCHLORIDE 200 MG: 50 TABLET ORAL at 21:28

## 2020-02-15 ASSESSMENT — ACTIVITIES OF DAILY LIVING (ADL)
ADLS_ACUITY_SCORE: 18
ADLS_ACUITY_SCORE: 16
ADLS_ACUITY_SCORE: 16
ADLS_ACUITY_SCORE: 18
ADLS_ACUITY_SCORE: 16
ADLS_ACUITY_SCORE: 16

## 2020-02-15 NOTE — PROGRESS NOTES
[facility]    Hospitalist Progress Note      Assessment & Plan     Jim Richard is a 64 year old male who was admitted on 2/3/2020. His history is significant for cirrhosis (w/ ascites, esophageal varices), EtOH use disorder, depression/anxiety, COPD, sleep apnea, anemia, admitted for EtOH intoxication and SI.    He was recently hospitalized at [facility] for weakness in EtOH withdrawal, and was seen by GI, CD, and Psych. It appears the initial plan was for him to go to TCU w/ close GI f/u to adjust his diuretics, but he refused TCU and went home. Since going home he has been drinking, medication compliance sporadic, with worsening abdominal ascites. He came in for SI in the setting of drinking.      Alcohol Withdrawal: has had 4 alcohol-related admission in the past year  - CD recs: patient will need to go to TCU first, then set up outpatient rehab  - Psychiatry recs: no indication to pursue commitment at this time  - cont daily thiamine, folate, MVI  - PT/OT: rec TCU       Alcoholic Cirrhosis with Esophageal Varices  - GI recs:   - Spironolactone 50mg PO daily   - Lasix 20mg PO daily   - Propranolol 10mg PO BID   - Pantoprazole   - Lactulose 10g PO BID, titrate to 2-3 BMs per day   - dicyclomine for abd cramping     Anemia with hx of Esophageal Varices  - f/u GI recs     Depression with Suicidal Ideation   Reported SI prior to hospitalization although no further SI noted after arrival  - Psychiatry assistance  - cont Duloxetine, Mirtazapine, Trazodone, prn Seroquel    FEN  - low Na diet     Dispo  - pending TCU placement followed by outpatient rehab        DVT Prophylaxis: Pneumatic Compression Devices  Code Status: DNR/DNI  Expected discharge: 2-3 days, recommended to TCU once bed is available    Efrain Aceves MD  Text Page (7am - 6pm, M-F)    Interval History   Sitting comfortably on chair this morning. Feel well w/o new symptoms.    -Data reviewed today: I reviewed all new labs and imaging  results over the last 24 hours.      Physical Exam   Temp: 98.4  F (36.9  C) Temp src: Oral BP: 123/65 Pulse: 50 Heart Rate: 57 Resp: 16 SpO2: 97 % O2 Device: None (Room air)    Vitals:    02/03/20 0800   Weight: 89.6 kg (197 lb 8.5 oz)     Vital Signs with Ranges  Temp:  [98.2  F (36.8  C)-98.4  F (36.9  C)] 98.4  F (36.9  C)  Pulse:  [49-50] 50  Heart Rate:  [53-59] 57  Resp:  [16-18] 16  BP: (119-133)/(64-73) 123/65  SpO2:  [96 %-97 %] 97 %  I/O last 3 completed shifts:  In: 1160 [P.O.:1160]  Out: -       General: well-appearing, NAD  HEENT: NC/AT, moist mucus membranes  Heart: RRR no m/r/g  Lungs: CTAB no crackles or wheezes  Abdomen: NABS, non-tender, mildly distended; no rebound or guarding; no HSM or masses.  Extremities: warm, well-perfused. 1+ bilateral ankle pitting edema.  Neuro: CN II-XII grossly intact, moving extremities spontaneously    Medications       dicyclomine  20 mg Oral 4x Daily AC & HS     DULoxetine  30 mg Oral Daily     fluticasone-vilanterol  1 puff Inhalation Daily     folic acid  1 mg Oral Daily     furosemide  20 mg Oral Daily     lactulose  10 g Oral BID     mirtazapine  15 mg Oral At Bedtime     multivitamin w/minerals  1 tablet Oral Daily     nicotine  1 patch Transdermal Daily     nicotine   Transdermal Q8H     pantoprazole  40 mg Oral QAM AC     propranolol  10 mg Oral BID     spironolactone  50 mg Oral Daily     traZODone  200 mg Oral At Bedtime     vitamin B1  100 mg Oral Daily       Data   Recent Labs   Lab 02/13/20  1224 02/12/20  0944 02/10/20  0728    133 134   POTASSIUM 4.4 4.5 3.7   CHLORIDE 105 103 102   CO2 26 23 26   BUN 27 27 25   CR 1.45* 1.44* 1.38*   ANIONGAP 3 7 6   KEV 9.0 8.8 8.6   * 108* 99       No results found for this or any previous visit (from the past 24 hour(s)).

## 2020-02-15 NOTE — PROGRESS NOTES
SW:  D:  Both PT and OT report patient is making good progress toward physical independence.  Also writer has not secured a TCU for patient.  Patient has been declined by   [facility],  all [facility] facilities and  all [facility] facilities,   [facility].    [facility] clinically accepted patient but did not have a bed available    A:  The optimal discharge plan has been to TCU with a direct transfer to [facility].    However patient is nearing the point where therapies may feel he no longer requires daily skilled therapy.    If patient discharge's to home, he lives with a roommate who does work. Patient has a history of relapsing once he returns home.    Plan: Have sent an email to SYLVIE Robbins, asking if [facility] have given an idea as to when they can admit patient.

## 2020-02-15 NOTE — PLAN OF CARE
DATE & TIME: 2/14-2/15/2020 0354-3221     Cognitive Concerns/ Orientation : A&O x4   BEHAVIOR & AGGRESSION TOOL COLOR: Green, calm/cooperative  ABNL VS/O2: Armando at times, Other VSS on RA  MOBILITY: SBA with GB/walker.  PAIN MANAGMENT: denies  DIET: 2 gm NA  BOWEL/BLADDER: Continent, has loose stool, On lactulose  ABNL LAB/BG: Creat 1.45  DRAIN/DEVICES: PIV SL.  SKIN: Bruising/scabs/abrasion on LUE.  TESTS/PROCEDURES: n/a  D/C DAY/GOALS/PLACE:  TCU placement pending placement   OTHER IMPORTANT INFO: PT/OT/SW following. Nicotine patch on L arm

## 2020-02-15 NOTE — PLAN OF CARE
DATE & TIME: 2/14/2020 2283-9606      Cognitive Concerns/ Orientation : A&O x4   BEHAVIOR & AGGRESSION TOOL COLOR: Green, calm/cooperative  ABNL VS/O2: VSS on RA, mandi at times  MOBILITY: SBA with GB/walker.  PAIN MANAGMENT: denies  DIET: 2 gm NA  BOWEL/BLADDER: Continent of both, had loose stool x. On lactulose  ABNL LAB/BG: Creat 1.45  DRAIN/DEVICES: PIV SL.  SKIN: Bruising/scabs/abrasion on LUE.  TESTS/PROCEDURES: n/a  D/C DAY/GOALS/PLACE:  TCU placement pending and CD treatment  OTHER IMPORTANT INFO: PT/OT/SW following. Nicotine patch on.

## 2020-02-15 NOTE — PLAN OF CARE
Discharge Planner OT   Patient plan for discharge: rehab facility and CD  Current status: Pt completed transfers, toileting and hallway ambulation with SBA-CGA initially used 2WW but discontinued as pt was steady, pt has shuffling gait and appears to fatigue at end of walk. Pt became disoriented in hallway but was able to problem solve how to get back to room with additional time. Pt showed impulsivity on 1 occasion. Will reduce frequency to 3x/week  Barriers to return to prior living situation: pt is at high risk for relapse, general safety, reduced IND in I/ADLs.   Recommendations for discharge: TCU  Rationale for recommendations: pt would benefit from skilled services to return to PLOF/driving, at this time pt would be appropriate to discharge to IP CD unit as well. Pt would benefit from in depth cog testing and driving skills assessment. Reducing OT treatment frequency to 3X/week, pt is progressing.        Entered by: Zamzam Chahal 02/15/2020 11:44 AM

## 2020-02-15 NOTE — PLAN OF CARE
DATE & TIME: 2/15/2020 7-3pm     Cognitive Concerns/ Orientation : A&O x4   BEHAVIOR & AGGRESSION TOOL COLOR: Green, calm/cooperative  ABNL VS/O2: Armando at times, Other VSS on RA  MOBILITY: SBA with GB/walker, walked hallways X2  PAIN MANAGMENT: denies  DIET: 2 gm NA  BOWEL/BLADDER: Continent of both, has loose stool, On lactulose  ABNL LAB/BG: NA  DRAIN/DEVICES: PIV SL.  SKIN: Bruising/scabs/abrasion on RUE.  TESTS/PROCEDURES: n/a  D/C DAY/GOALS/PLACE:  TCU placement pending placement   OTHER IMPORTANT INFO: PT/OT/SW following. Nicotine patch on right arm

## 2020-02-15 NOTE — PLAN OF CARE
Discharge Planner PT   Patient plan for discharge: TCU and then CD treatment  Current status: Pt agreeable to session, SBA for transfers and ambulation of 450 feet with no AD and SBA. Encouraged pt to ambulate with nursing staff throughout the day   Barriers to return to prior living situation: Level of assist-lack of assist upon return home, Impaired balance, Fall risk  Recommendations for discharge: TCU prior to CD treatment  Rationale for recommendations: Pt will benefit from continued skilled PT intervention in order to progress higher level balance in order to improve independence and safety with mobility.        Entered by: Michaela Carpenter 02/15/2020 12:53 PM

## 2020-02-16 PROCEDURE — 25000132 ZZH RX MED GY IP 250 OP 250 PS 637: Mod: GY | Performed by: INTERNAL MEDICINE

## 2020-02-16 PROCEDURE — 12000000 ZZH R&B MED SURG/OB

## 2020-02-16 PROCEDURE — 25000132 ZZH RX MED GY IP 250 OP 250 PS 637: Mod: GY | Performed by: HOSPITALIST

## 2020-02-16 PROCEDURE — 99231 SBSQ HOSP IP/OBS SF/LOW 25: CPT | Performed by: INTERNAL MEDICINE

## 2020-02-16 PROCEDURE — 25000132 ZZH RX MED GY IP 250 OP 250 PS 637: Mod: GY | Performed by: PHYSICIAN ASSISTANT

## 2020-02-16 PROCEDURE — 25000132 ZZH RX MED GY IP 250 OP 250 PS 637: Mod: GY | Performed by: PSYCHIATRY & NEUROLOGY

## 2020-02-16 RX ADMIN — FLUTICASONE FUROATE AND VILANTEROL TRIFENATATE 1 PUFF: 200; 25 POWDER RESPIRATORY (INHALATION) at 08:12

## 2020-02-16 RX ADMIN — THIAMINE HCL TAB 100 MG 100 MG: 100 TAB at 08:14

## 2020-02-16 RX ADMIN — FUROSEMIDE 20 MG: 20 TABLET ORAL at 08:14

## 2020-02-16 RX ADMIN — LACTULOSE 10 G: 10 SOLUTION ORAL at 08:13

## 2020-02-16 RX ADMIN — PROPRANOLOL HYDROCHLORIDE 10 MG: 10 TABLET ORAL at 08:14

## 2020-02-16 RX ADMIN — DULOXETINE HYDROCHLORIDE 30 MG: 30 CAPSULE, DELAYED RELEASE ORAL at 08:14

## 2020-02-16 RX ADMIN — LACTULOSE 10 G: 10 SOLUTION ORAL at 21:09

## 2020-02-16 RX ADMIN — FOLIC ACID 1 MG: 1 TABLET ORAL at 08:14

## 2020-02-16 RX ADMIN — TRAZODONE HYDROCHLORIDE 200 MG: 50 TABLET ORAL at 21:08

## 2020-02-16 RX ADMIN — MULTIPLE VITAMINS W/ MINERALS TAB 1 TABLET: TAB at 08:14

## 2020-02-16 RX ADMIN — DICYCLOMINE HYDROCHLORIDE 20 MG: 20 TABLET ORAL at 06:46

## 2020-02-16 RX ADMIN — DICYCLOMINE HYDROCHLORIDE 20 MG: 20 TABLET ORAL at 21:08

## 2020-02-16 RX ADMIN — DICYCLOMINE HYDROCHLORIDE 20 MG: 20 TABLET ORAL at 11:40

## 2020-02-16 RX ADMIN — PANTOPRAZOLE SODIUM 40 MG: 40 TABLET, DELAYED RELEASE ORAL at 06:46

## 2020-02-16 RX ADMIN — DICYCLOMINE HYDROCHLORIDE 20 MG: 20 TABLET ORAL at 15:52

## 2020-02-16 RX ADMIN — SPIRONOLACTONE 50 MG: 25 TABLET ORAL at 08:14

## 2020-02-16 RX ADMIN — MIRTAZAPINE 15 MG: 15 TABLET, FILM COATED ORAL at 21:08

## 2020-02-16 RX ADMIN — NICOTINE 1 PATCH: 14 PATCH, EXTENDED RELEASE TRANSDERMAL at 08:13

## 2020-02-16 ASSESSMENT — ACTIVITIES OF DAILY LIVING (ADL)
ADLS_ACUITY_SCORE: 18

## 2020-02-16 NOTE — PLAN OF CARE
DATE & TIME: 2/16/2020 7-3pm  Cognitive Concerns/ Orientation : A&O x4   BEHAVIOR & AGGRESSION TOOL COLOR: Green, calm/cooperative  ABNL VS/O2: Armando at times, Other VSS on RA  MOBILITY: SBA with GB/walker, ambulated hallways X3  PAIN MANAGMENT: denies  DIET: 2 gm NA  BOWEL/BLADDER: Continent of both, has loose stool, On lactulose  ABNL LAB/BG: NA  DRAIN/DEVICES: PIV SL.  SKIN: Bruising/scabs/abrasion on Bilateral UE's.  TESTS/PROCEDURES: n/a  D/C DAY/GOALS/PLACE:  discharge to TCU  pending placement   OTHER IMPORTANT INFO: PT/OT/SW following. Nicotine patch on right arm

## 2020-02-16 NOTE — PLAN OF CARE
DATE & TIME: 2/15-2/16/2020 5722-1820     Cognitive Concerns/ Orientation : A&O x4   BEHAVIOR & AGGRESSION TOOL COLOR: Green, calm/cooperative  ABNL VS/O2: Armando at times, Other VSS on RA  MOBILITY: SBA with GB/walker  PAIN MANAGMENT: denies  DIET: 2 gm NA  BOWEL/BLADDER: Continent of both, has loose stool, On lactulose  ABNL LAB/BG: NA  DRAIN/DEVICES: PIV SL.  SKIN: Bruising/scabs/abrasion on RUE.  TESTS/PROCEDURES: n/a  D/C DAY/GOALS/PLACE:  discharge to TCU  pending placement   OTHER IMPORTANT INFO: PT/OT/SW following. Nicotine patch on right arm

## 2020-02-16 NOTE — PROGRESS NOTES
[facility]    Hospitalist Progress Note      Assessment & Plan     Jim Richard is a 64 year old male who was admitted on 2/3/2020. His history is significant for cirrhosis (w/ ascites, esophageal varices), EtOH use disorder, depression/anxiety, COPD, sleep apnea, anemia, admitted for EtOH intoxication and SI.    He was recently hospitalized at [facility] for weakness in EtOH withdrawal, and was seen by GI, CD, and Psych. It appears the initial plan was for him to go to TCU w/ close GI f/u to adjust his diuretics, but he refused TCU and went home. Since going home he has been drinking, medication compliance sporadic, with worsening abdominal ascites. He came in for SI in the setting of drinking.      Alcohol Withdrawal: has had 4 alcohol-related admission in the past year  - CD recs: patient will need to go to TCU first, then set up outpatient rehab  - Psychiatry recs: no indication to pursue commitment at this time  - cont daily thiamine, folate, MVI  - PT/OT: rec TCU       Alcoholic Cirrhosis with Esophageal Varices  - GI recs:   - Spironolactone 50mg PO daily   - Lasix 20mg PO daily   - Propranolol 10mg PO BID   - Pantoprazole   - Lactulose 10g PO BID, titrate to 2-3 BMs per day   - dicyclomine for abd cramping     Anemia with hx of Esophageal Varices  - f/u GI recs     Depression with Suicidal Ideation   Reported SI prior to hospitalization although no further SI noted after arrival  - Psychiatry assistance  - cont Duloxetine, Mirtazapine, Trazodone, prn Seroquel    FEN  - low Na diet     Dispo  - pending TCU placement followed by outpatient rehab        DVT Prophylaxis: Pneumatic Compression Devices  Code Status: DNR/DNI  Expected discharge: 2-3 days, recommended to TCU once bed is available    Efrain Aceves MD  Text Page (7am - 6pm, M-F)    Interval History   Sitting comfortably on chair this morning. Feeling well.    -Data reviewed today: I reviewed all new labs and imaging results over  the last 24 hours.      Physical Exam   Temp: 98.3  F (36.8  C) Temp src: Oral BP: 114/61 Pulse: (!) 48 Heart Rate: 50 Resp: 14 SpO2: 97 % O2 Device: None (Room air)    Vitals:    02/03/20 0800   Weight: 89.6 kg (197 lb 8.5 oz)     Vital Signs with Ranges  Temp:  [98.1  F (36.7  C)-98.3  F (36.8  C)] 98.3  F (36.8  C)  Pulse:  [48-50] 48  Heart Rate:  [50-57] 50  Resp:  [14-16] 14  BP: ()/(56-73) 114/61  SpO2:  [95 %-97 %] 97 %  I/O last 3 completed shifts:  In: 1000 [P.O.:1000]  Out: -       General: well-appearing, NAD  HEENT: NC/AT, moist mucus membranes  Heart: RRR no m/r/g  Lungs: CTAB no crackles or wheezes  Abdomen: NABS, non-tender, mildly distended; no rebound or guarding; no HSM or masses.  Extremities: warm, well-perfused. 1+ bilateral ankle pitting edema.  Neuro: CN II-XII grossly intact, moving extremities spontaneously    Medications       dicyclomine  20 mg Oral 4x Daily AC & HS     DULoxetine  30 mg Oral Daily     fluticasone-vilanterol  1 puff Inhalation Daily     folic acid  1 mg Oral Daily     furosemide  20 mg Oral Daily     lactulose  10 g Oral BID     mirtazapine  15 mg Oral At Bedtime     multivitamin w/minerals  1 tablet Oral Daily     nicotine  1 patch Transdermal Daily     nicotine   Transdermal Q8H     pantoprazole  40 mg Oral QAM AC     propranolol  10 mg Oral BID     spironolactone  50 mg Oral Daily     traZODone  200 mg Oral At Bedtime     vitamin B1  100 mg Oral Daily       Data   Recent Labs   Lab 02/13/20  1224 02/12/20  0944 02/10/20  0728    133 134   POTASSIUM 4.4 4.5 3.7   CHLORIDE 105 103 102   CO2 26 23 26   BUN 27 27 25   CR 1.45* 1.44* 1.38*   ANIONGAP 3 7 6   KEV 9.0 8.8 8.6   * 108* 99       No results found for this or any previous visit (from the past 24 hour(s)).

## 2020-02-16 NOTE — PROGRESS NOTES
SW Discharge planning  I: Met with patient.  He reports he does not want to go home from the hospital and wants to transition to a TCU or directly to [facility] program.  As of today, we do not have a TCU accepting patient.  Patient continues to show improvement with therapies and is walking several times a day with nursing staff.   Writer has left a message with [facility] to inquire about their wait list.  PLAN; Will follow up with [facility] and also make additional TCU referrals.

## 2020-02-17 ENCOUNTER — APPOINTMENT (OUTPATIENT)
Dept: PHYSICAL THERAPY | Facility: CLINIC | Age: 65
DRG: 433 | End: 2020-02-17
Payer: MEDICARE

## 2020-02-17 ENCOUNTER — APPOINTMENT (OUTPATIENT)
Dept: OCCUPATIONAL THERAPY | Facility: CLINIC | Age: 65
DRG: 433 | End: 2020-02-17
Payer: MEDICARE

## 2020-02-17 LAB
ANION GAP SERPL CALCULATED.3IONS-SCNC: 7 MMOL/L (ref 3–14)
BUN SERPL-MCNC: 23 MG/DL (ref 7–30)
CALCIUM SERPL-MCNC: 9 MG/DL (ref 8.5–10.1)
CHLORIDE SERPL-SCNC: 102 MMOL/L (ref 94–109)
CO2 SERPL-SCNC: 23 MMOL/L (ref 20–32)
CREAT SERPL-MCNC: 1.38 MG/DL (ref 0.66–1.25)
GFR SERPL CREATININE-BSD FRML MDRD: 53 ML/MIN/{1.73_M2}
GLUCOSE SERPL-MCNC: 141 MG/DL (ref 70–99)
POTASSIUM SERPL-SCNC: 4.2 MMOL/L (ref 3.4–5.3)
SODIUM SERPL-SCNC: 132 MMOL/L (ref 133–144)

## 2020-02-17 PROCEDURE — 97112 NEUROMUSCULAR REEDUCATION: CPT | Mod: GP | Performed by: PHYSICAL THERAPIST

## 2020-02-17 PROCEDURE — 97116 GAIT TRAINING THERAPY: CPT | Mod: GP | Performed by: PHYSICAL THERAPIST

## 2020-02-17 PROCEDURE — 25000132 ZZH RX MED GY IP 250 OP 250 PS 637: Mod: GY | Performed by: INTERNAL MEDICINE

## 2020-02-17 PROCEDURE — 97750 PHYSICAL PERFORMANCE TEST: CPT | Mod: GP | Performed by: PHYSICAL THERAPIST

## 2020-02-17 PROCEDURE — 99231 SBSQ HOSP IP/OBS SF/LOW 25: CPT | Performed by: INTERNAL MEDICINE

## 2020-02-17 PROCEDURE — 97535 SELF CARE MNGMENT TRAINING: CPT | Mod: GO | Performed by: OCCUPATIONAL THERAPY ASSISTANT

## 2020-02-17 PROCEDURE — 25000132 ZZH RX MED GY IP 250 OP 250 PS 637: Mod: GY | Performed by: PHYSICIAN ASSISTANT

## 2020-02-17 PROCEDURE — 25000132 ZZH RX MED GY IP 250 OP 250 PS 637: Mod: GY | Performed by: PSYCHIATRY & NEUROLOGY

## 2020-02-17 PROCEDURE — 80048 BASIC METABOLIC PNL TOTAL CA: CPT | Performed by: PHYSICIAN ASSISTANT

## 2020-02-17 PROCEDURE — 12000000 ZZH R&B MED SURG/OB

## 2020-02-17 PROCEDURE — 36415 COLL VENOUS BLD VENIPUNCTURE: CPT | Performed by: PHYSICIAN ASSISTANT

## 2020-02-17 PROCEDURE — 25000132 ZZH RX MED GY IP 250 OP 250 PS 637: Mod: GY | Performed by: HOSPITALIST

## 2020-02-17 RX ADMIN — DULOXETINE HYDROCHLORIDE 30 MG: 30 CAPSULE, DELAYED RELEASE ORAL at 10:08

## 2020-02-17 RX ADMIN — PANTOPRAZOLE SODIUM 40 MG: 40 TABLET, DELAYED RELEASE ORAL at 10:08

## 2020-02-17 RX ADMIN — FLUTICASONE FUROATE AND VILANTEROL TRIFENATATE 1 PUFF: 200; 25 POWDER RESPIRATORY (INHALATION) at 10:05

## 2020-02-17 RX ADMIN — LACTULOSE 10 G: 10 SOLUTION ORAL at 10:05

## 2020-02-17 RX ADMIN — DICYCLOMINE HYDROCHLORIDE 20 MG: 20 TABLET ORAL at 10:06

## 2020-02-17 RX ADMIN — MIRTAZAPINE 15 MG: 15 TABLET, FILM COATED ORAL at 21:03

## 2020-02-17 RX ADMIN — MULTIPLE VITAMINS W/ MINERALS TAB 1 TABLET: TAB at 10:09

## 2020-02-17 RX ADMIN — THIAMINE HCL TAB 100 MG 100 MG: 100 TAB at 10:08

## 2020-02-17 RX ADMIN — FOLIC ACID 1 MG: 1 TABLET ORAL at 10:08

## 2020-02-17 RX ADMIN — FUROSEMIDE 20 MG: 20 TABLET ORAL at 10:08

## 2020-02-17 RX ADMIN — NICOTINE 1 PATCH: 14 PATCH, EXTENDED RELEASE TRANSDERMAL at 10:12

## 2020-02-17 RX ADMIN — DICYCLOMINE HYDROCHLORIDE 20 MG: 20 TABLET ORAL at 21:47

## 2020-02-17 RX ADMIN — DICYCLOMINE HYDROCHLORIDE 20 MG: 20 TABLET ORAL at 16:43

## 2020-02-17 RX ADMIN — SPIRONOLACTONE 50 MG: 25 TABLET ORAL at 10:06

## 2020-02-17 RX ADMIN — TRAZODONE HYDROCHLORIDE 200 MG: 50 TABLET ORAL at 21:47

## 2020-02-17 RX ADMIN — PROPRANOLOL HYDROCHLORIDE 10 MG: 10 TABLET ORAL at 21:03

## 2020-02-17 RX ADMIN — LACTULOSE 10 G: 10 SOLUTION ORAL at 21:03

## 2020-02-17 ASSESSMENT — ACTIVITIES OF DAILY LIVING (ADL)
ADLS_ACUITY_SCORE: 18

## 2020-02-17 NOTE — PROGRESS NOTES
[facility]  Gastroenterology Progress Note     Jim Richard MRN# [id number]   YOB: 1955 Age: 64 year old          Assessment and Plan:        Assessment and Plan:   Update: Patient had stable hemoglobin and platelets.  Abdomen soft. Having 2-4 bowel movements daily. NO report of melena. Creatinine has increased to 1.45 from baseline of 1.20. Last checked 4 days ago.     Active Problems:   Alcohol withdrawal (H)  Alcohol liver cirrhosis with ascites and esophageal varices  Jim Richard is a pleasant 64 year old male with history of alcohol abuse, alcoholic liver cirrhosis with ascites and esophageal varices with diarrhea for 3 days. GI consulted for continuation of care and history of cirrhosis. Patient has had exacerbation of symptoms associated with excessive alcohol intake. Underwent ultrasound guided paracentesis and had 6750 mL of serous fluid removed. Hemoglobin stable and at baseline. Has been noncompliant with medication. Has been started on propranolol with history of esophageal varices. Has had lactulose held with diarrhea and stools incontinence and diarrhea. Having 2-4 stools a day.     - Recommend to consider inpatient alcohol treatment given 4 admissions in last year for alcohol related problems  - Continue with spironolactone to 50 mg and furosemide 20 mg daily - will repeat BMP today  - May restart lactulose if stools less than 3 a day.- will continue with one tablespoon a day 10g/15mL  - Continue with pantoprazole and propranolol  - Continue on dicyclomine for abdominal cramping  - IF abdominal distention worse can plan paracentesis              Alcoholic cirrhosis of liver with ascites (H)  Alcoholic intoxication with complication (H)  Suicidal ideation      Interval History:   no new complaints, denies chest pain, denies shortness of breath, denies abdominal pain, has had a bowel movement in the last 24 hours and doing well; no cp, sob, n/v/d, or abd pain.               Review of Systems:   C: NEGATIVE for fever, chills, change in weight  E/M: NEGATIVE for ear, mouth and throat problems  R: NEGATIVE for significant cough or SOB  CV: NEGATIVE for chest pain, palpitations or peripheral edema             Medications:   I have reviewed this patient's current medications    dicyclomine  20 mg Oral 4x Daily AC & HS     DULoxetine  30 mg Oral Daily     fluticasone-vilanterol  1 puff Inhalation Daily     folic acid  1 mg Oral Daily     furosemide  20 mg Oral Daily     lactulose  10 g Oral BID     mirtazapine  15 mg Oral At Bedtime     multivitamin w/minerals  1 tablet Oral Daily     nicotine  1 patch Transdermal Daily     nicotine   Transdermal Q8H     pantoprazole  40 mg Oral QAM AC     propranolol  10 mg Oral BID     spironolactone  50 mg Oral Daily     traZODone  200 mg Oral At Bedtime     vitamin B1  100 mg Oral Daily                  Physical Exam:   Vitals were reviewed  Vital Signs with Ranges  Temp:  [98.4  F (36.9  C)-98.5  F (36.9  C)] 98.4  F (36.9  C)  Pulse:  [48] 48  Heart Rate:  [47-51] 51  Resp:  [16-18] 18  BP: (102-123)/(52-67) 121/64  SpO2:  [96 %-97 %] 96 %  I/O last 3 completed shifts:  In: 480 [P.O.:480]  Out: -   Constitutional: healthy, alert and no distress   Cardiovascular: negative, PMI normal. No lifts, heaves, or thrills. RRR. No murmurs, clicks gallops or rub  Respiratory: negative, Percussion normal. Good diaphragmatic excursion. Lungs clear  Head: Normocephalic. No masses, lesions, tenderness or abnormalities  Neck: Neck supple. No adenopathy. Thyroid symmetric, normal size,, Carotids without bruits.  Abdomen: Abdomen soft, non-tender. BS normal. No masses, organomegaly, positive findings: distended           Data:   I reviewed the patient's new clinical lab test results.   Recent Labs   Lab Test 02/08/20  0735 02/07/20  0758 02/06/20  1413 02/06/20  0946 02/06/20  0805  02/03/20  0218   WBC 3.3* 3.4*  --   --  3.9*   < > 7.9   HGB 8.8* 8.2*  8.6*  --  8.4*   < > 9.6*   MCV 96 97  --   --  97   < > 97   PLT 67* 58*  --   --  49*   < > 153   INR  --  1.36*  --  1.29*  --   --  1.18*    < > = values in this interval not displayed.     Recent Labs   Lab Test 02/13/20  1224 02/12/20  0944 02/10/20  0728   POTASSIUM 4.4 4.5 3.7   CHLORIDE 105 103 102   CO2 26 23 26   BUN 27 27 25   ANIONGAP 3 7 6     Recent Labs   Lab Test 02/07/20  0758 02/04/20  0800 02/03/20  0218  01/11/20  1730  11/13/19  1858 11/13/19  1849  02/13/19  2340  09/25/14  0510   ALBUMIN 2.2* 2.3* 2.7*   < >  --    < > 3.0*  --    < > 3.1*   < >  --    BILITOTAL 1.0 2.5* 1.6*   < >  --    < > 1.0  --    < > 0.4   < >  --    ALT 19 22 26   < >  --    < > 24  --    < > 41   < >  --    AST 37 53* 61*   < >  --    < > 58*  --    < > 89*   < >  --    PROTEIN  --   --   --   --  Negative  --   --  30*  --   --   --  Negative   LIPASE  --   --  369  --   --   --  499*  --   --  536*   < >  --     < > = values in this interval not displayed.       I reviewed the patient's new imaging results.    All laboratory data reviewed  All imaging studies reviewed by me.    Gloria De Leon PA-C,  2/17/2020  [facility]  Office : [phone]  Cell: [phone] (Dr. Shaw)  Cell: [phone] (Gloria De Leon PA-C)

## 2020-02-17 NOTE — PROGRESS NOTES
SW  D:  SLUM score noted.  Will need to pass this on to [facility] and determined if they can accept patient.  If they decline next option is [facility] which provide CD treatment for patient's with TBI or cognitive impairment.  [facility] is not eligible for Medicare funding, thus we would need to determine if patient would qualify for any Consulidated Funding thru thru the State.  Will discuss with patient tomorrow.

## 2020-02-17 NOTE — PROGRESS NOTES
SW:  Discharge Planning  Writer spoke with Jim at [facility]'s CD admission office [phone] option 2, then option 1.    Jim is familiar with patient.  He however reports he has not received a CD assessment for patient.  Writer faxed the CD assessment updated by Sherly Royal on 2/13, along with medical information to Jim at [phone].  Jim reports their current wait list is 2&1/2 weeks.   also contacted [facility] at [facility].  [phone].  Spoke with Sheila.  She reports their program has about a one week wait list. Referral include CD assessment and clinical information faxed to [phone].

## 2020-02-17 NOTE — PLAN OF CARE
DATE & TIME: 2/16/2020 [id number]  Cognitive Concerns/ Orientation : A&O x4   BEHAVIOR & AGGRESSION TOOL COLOR: Green, calm/cooperative  ABNL VS/O2: Armando at times, Other VSS on RA  MOBILITY: SBA w/ GB & walker, ambulated hallways X1  PAIN MANAGMENT: denies  DIET: 2 gm NA+  BOWEL/BLADDER: Continent of B/B, loose stools; on lactulose  ABNL LAB/BG: NA  DRAIN/DEVICES: PIV SL.  SKIN: Bruising/scabs/abrasion on Bilateral UE's.  TESTS/PROCEDURES: NA  D/C DAY/GOALS/PLACE:  discharge to TCU vs alcoholic rehab (pending placement)  OTHER IMPORTANT INFO: PT/OT/SW following. Nicotine patch in place.

## 2020-02-17 NOTE — PLAN OF CARE
Discharge Planner OT   Patient plan for discharge: not stated   Current status: Pt in chair upon arrival, sit to stand with supervision, amb to/from bathroom with no assistive device and SBA, pt completed toileting, pericare and clothing management with SBA, pt transferred on/off toilet with SBA and no grab bars, pt returned to chair and completed 2 trail making tasks to determine processing speed, pt able to complete accurately but required increase in time to complete, determining delayed processing skills to affect independence in community mobility, safety with ADLs/IADLs. Pt completed a 4 medication management task, pt able to complete 3/4 correctly and stating he would write down times for PRN medication to determine when he can take it.   Barriers to return to prior living situation: pt is at high risk for relapse, general safety, reduced IND in I/ADLs.   Recommendations for discharge: After collaboration with the OTR, pt has progressed and is able to go to inpatient CD treatment followed by OP OT  Rationale for recommendations: pt would benefit from skilled services to return to PLOF/driving, at this time pt would be appropriate to discharge to IP CD unit as well. Pt would benefit from in depth cog testing and driving skills assessment in an OP OT setting; thus, recommend A for transportation until that time.        Entered by: Ursula Jones 02/17/2020 11:03 AM

## 2020-02-17 NOTE — PROGRESS NOTES
MD Notification    Notified Person: MD    Notified Person Name: Jacek Gordon    Notification Date/Time: 02/16/2020 2120    Notification Interaction: phone page     Purpose of Notification: propanolol parameters needed. Pt's BP normal but HR has been trending less than 50- bedtime dose to be given?     Orders Received: Parameters to hold if HR under 50.     Comments:

## 2020-02-17 NOTE — PROGRESS NOTES
SW:  D:  Due to the OT notes recommending cognitive assessment as an out patient,  [facility] is requesting a SLUMS test be completed here. Once they see the result they will determine if patient is appropriate for their program.  [facility] has paged OT staff.

## 2020-02-17 NOTE — PLAN OF CARE
DATE & TIME: 2/16/2020 2671-1908  Cognitive Concerns/ Orientation : A&O x4   BEHAVIOR & AGGRESSION TOOL COLOR: Green, calm/cooperative  ABNL VS/O2: Armando at times, Other VSS on RA  MOBILITY: SBA w/ GB & walker, ambulated hallways X1  PAIN MANAGMENT: denies  DIET: 2 gm NA+  BOWEL/BLADDER: Continent of B/B, loose stools; on lactulose  ABNL LAB/BG: NA  DRAIN/DEVICES: PIV SL.  SKIN: Bruising/scabs/abrasion on Bilateral UE's.  TESTS/PROCEDURES: NA  D/C DAY/GOALS/PLACE:  discharge to TCU vs alcoholic rehab (pending placement)  OTHER IMPORTANT INFO: PT/OT/SW following. Nicotine patch in place.

## 2020-02-17 NOTE — PLAN OF CARE
DATE & TIME: 2/16/2020 3417-0945  Cognitive Concerns/ Orientation : A&O x4   BEHAVIOR & AGGRESSION TOOL COLOR: Green, calm/cooperative  ABNL VS/O2: Armando at times, Other VSS on RA  MOBILITY: SBA w/ GB & walker, ambulated hallways X1  PAIN MANAGMENT: denies  DIET: 2 gm NA+  BOWEL/BLADDER: Continent of B/B, loose stools; on lactulose  ABNL LAB/BG: NA  DRAIN/DEVICES: PIV SL.  SKIN: Bruising/scabs/abrasion on Bilateral UE's.  TESTS/PROCEDURES: NA  D/C DAY/GOALS/PLACE:  discharge to TCU vs alcoholic rehab (pending placement)  OTHER IMPORTANT INFO: PT/OT/SW following. Nicotine patch in place.

## 2020-02-17 NOTE — PLAN OF CARE
Discharge Planner OT   Patient plan for discharge: not stated   Current status: SW request for SLUMS to be taken for CD treatment placement, pt in chair up on arrival, agreeable to participate in therapy. Pt completed the SLUMS with a score of 12/30, scoring in the Dementia range, pt showed deficits with STM and executive functioning.   Barriers to return to prior living situation: pt is at high risk for relapse, general safety, reduced IND in I/ADLs.   Recommendations for discharge: IP CD treatment with OP OT after per plan established by the Occupational Therapist.   Rationale for recommendations: pt would benefit from skilled services to return to PLOF/driving, at this time pt would be appropriate to discharge to IP CD unit as well. Pt would benefit from in depth cog testing and driving skills assessment in an OP OT setting; thus, recommend A for transportation until that time.        Entered by: Ursula Jones 02/17/2020 1:08 PM

## 2020-02-17 NOTE — TREATMENT PLAN
Cross-Cover Note    Called regarding hold parameters on propranolol. BP normal HR in the 40's. Added a hold parameter of less than 50.

## 2020-02-17 NOTE — PROGRESS NOTES
[facility]    Hospitalist Progress Note      Assessment & Plan     Jim Richard is a 64 year old male who was admitted on 2/3/2020. His history is significant for cirrhosis (w/ ascites, esophageal varices), EtOH use disorder, depression/anxiety, COPD, sleep apnea, anemia, admitted for EtOH intoxication and SI.    He was recently hospitalized at [facility] for weakness in EtOH withdrawal, and was seen by GI, CD, and Psych. It appears the initial plan was for him to go to TCU w/ close GI f/u to adjust his diuretics, but he refused TCU and went home. Since going home he has been drinking, medication compliance sporadic, with worsening abdominal ascites. He came in for SI in the setting of drinking.      Alcohol Withdrawal: has had 4 alcohol-related admission in the past year  - CD recs: patient will need to go to TCU first, then set up outpatient rehab  - Psychiatry recs: no indication to pursue commitment at this time  - cont daily thiamine, folate, MVI  - PT/OT: rec TCU       Alcoholic Cirrhosis with Esophageal Varices  - GI recs:   - Spironolactone 50 mg PO daily   - Lasix 20 mg PO daily   - Propranolol 10 mg PO BID   - Pantoprazole   - Lactulose 10 g PO BID, titrate to 2-3 BMs per day   - dicyclomine for abd cramping     Anemia with hx of Esophageal Varices  - f/u GI recs     Depression with Suicidal Ideation   Reported SI prior to hospitalization although no further SI noted after arrival  - Psychiatry assistance  - cont Duloxetine, Mirtazapine, Trazodone, prn Seroquel    FEN  - low Na diet     Dispo  - pending TCU placement followed by outpatient rehab        DVT Prophylaxis: Pneumatic Compression Devices  Code Status: DNR/DNI  Expected discharge: 2-3 days, recommended to TCU once bed is available    Efrain Aceves MD  Text Page (7am - 6pm, M-F)    Interval History   Sitting comfortably on bed this morning. Feeling well. No new symptoms.    -Data reviewed today: I reviewed all new labs and  imaging results over the last 24 hours.      Physical Exam   Temp: 98.4  F (36.9  C) Temp src: Oral BP: 121/64 Pulse: (!) 48 Heart Rate: 51 Resp: 18 SpO2: 96 % O2 Device: None (Room air)    Vitals:    02/03/20 0800   Weight: 89.6 kg (197 lb 8.5 oz)     Vital Signs with Ranges  Temp:  [98.4  F (36.9  C)-98.5  F (36.9  C)] 98.4  F (36.9  C)  Pulse:  [48] 48  Heart Rate:  [47-51] 51  Resp:  [16-18] 18  BP: (102-123)/(52-67) 121/64  SpO2:  [96 %-97 %] 96 %  I/O last 3 completed shifts:  In: 480 [P.O.:480]  Out: -       General: well-appearing, NAD  HEENT: NC/AT, moist mucus membranes  Heart: RRR no m/r/g  Lungs: CTAB no crackles or wheezes  Abdomen: NABS, non-tender, +distended; no rebound or guarding; no HSM or masses.  Extremities: warm, well-perfused. 1+ bilateral ankle pitting edema.  Neuro: CN II-XII grossly intact, moving extremities spontaneously    Medications       dicyclomine  20 mg Oral 4x Daily AC & HS     DULoxetine  30 mg Oral Daily     fluticasone-vilanterol  1 puff Inhalation Daily     folic acid  1 mg Oral Daily     furosemide  20 mg Oral Daily     lactulose  10 g Oral BID     mirtazapine  15 mg Oral At Bedtime     multivitamin w/minerals  1 tablet Oral Daily     nicotine  1 patch Transdermal Daily     nicotine   Transdermal Q8H     pantoprazole  40 mg Oral QAM AC     propranolol  10 mg Oral BID     spironolactone  50 mg Oral Daily     traZODone  200 mg Oral At Bedtime     vitamin B1  100 mg Oral Daily       Data   Recent Labs   Lab 02/13/20  1224 02/12/20  0944    133   POTASSIUM 4.4 4.5   CHLORIDE 105 103   CO2 26 23   BUN 27 27   CR 1.45* 1.44*   ANIONGAP 3 7   KEV 9.0 8.8   * 108*       No results found for this or any previous visit (from the past 24 hour(s)).

## 2020-02-17 NOTE — PLAN OF CARE
DATE & TIME: 2/17/2020 AM   Cognitive Concerns/ Orientation : Pt alert and oriented x4.    BEHAVIOR & AGGRESSION TOOL COLOR: Green  CIWA SCORE: NA   ABNL VS/O2: HR- 51. Room air.  MOBILITY: UP with standby.  PAIN MANAGMENT: Denies pain  DIET: 2 G sodium diet  BOWEL/BLADDER: Continent of urine and stools. Reports diarrhea.  ABNL LAB/BG:   DRAIN/DEVICES: PIV saline locked  TELEMETRY RHYTHM: NA  SKIN: Wound on arm. Clean dry and intact  TESTS/PROCEDURES: NA  D/C DAY/GOALS/PLACE: discharge pending placement at TCU or inpatient rehab.  OTHER IMPORTANT INFO: PT, OT, Social work  Following.

## 2020-02-17 NOTE — PLAN OF CARE
DATE & TIME: 2/17/2020 Night  Cognitive Concerns/ Orientation : A&O x4   BEHAVIOR & AGGRESSION TOOL COLOR: Green, calm/cooperative  ABNL VS/O2: Armando, Other VSS on RA- held propanolol at bedtime. Parameters to hold <50.   MOBILITY: SBA w/ GB & walker, ambulated in hallways X1  PAIN MANAGMENT: denies  DIET: 2 gm NA+  BOWEL/BLADDER: Continent of B/B, loose stools; on lactulose  ABNL LAB/BG: NA  DRAIN/DEVICES: PIV SL.  SKIN: Bruising/scabs/abrasion on Bilateral UE's.  TESTS/PROCEDURES: NA  D/C DAY/GOALS/PLACE:  discharge to TCU vs alcoholic rehab (pending placement)  OTHER IMPORTANT INFO: PT/OT/SW following. Nicotine patch in place. Continued to encourage pt to be optimistic about tx facilites- able to talk through concerns- pt is feeling hopeful.    Dr. Irving Matos paged regarding labs, awaiting call back.

## 2020-02-17 NOTE — PROGRESS NOTES
"CLINICAL NUTRITION SERVICES - REASSESSMENT NOTE      Malnutrition: (2/10)  % Weight Loss:  None noted  % Intake:  No decreased intake noted  Subcutaneous Fat Loss:  None observed  Muscle Loss:  None observed  Fluid Retention:  LE Trace     Malnutrition Diagnosis: Patient does not meet two of the above criteria necessary for diagnosing malnutrition       EVALUATION OF PROGRESS TOWARD GOALS   Diet:  2 gram Na + Boost once daily at 2 pm    Intake/Tolerance:  Visited with patient this morning.  He had just finished 100% of his breakfast --> Omelet, yogurt, hashbrowns, muffin, pineapple, and coffee x 2.  Patient reports very good appetite, eating 100% of meals and taking all of the Boost.  \"I'm going to end up gaining weight here\".  Noted patient is waiting on a TCU bed otherwise ready for discharge.       NEW FINDINGS:   Weight not measured since 2/3  Receiving Folic Acid, Lasix, Lactulose, Theravite with minerals, and Thiamine daily       Previous Goals (2/10):   Pt to consume % of meals and supplements  Evaluation: Met    Previous Nutrition Diagnosis (2/10):   Inadequate oral intake related to alcohol abuse and limited ability to prepare food as evidenced by pt reports minimal intake PTA  Evaluation: Resolved       CURRENT NUTRITION DIAGNOSIS  No nutrition diagnosis identified at this time     INTERVENTIONS  Recommendations / Nutrition Prescription  Continue regular diet as tolerated  Boost once daily at 2 pm     Implementation  None     Goals  Patient will continue to consume 100% of meals and supplements    MONITORING AND EVALUATION:  Progress towards goals will be monitored and evaluated per protocol and Practice Guidelines    Nesha Muñoz RD, LD, CNSC   Clinical Dietitian - [facility]           "

## 2020-02-17 NOTE — PLAN OF CARE
Discharge Planner PT   Patient plan for discharge: TCU and then CD treatment  Current status: Pt able to perform bed mobility and sit to stands independently. Pt able to ambulate 90' 2x with no AD and IND. Pt able to perform flight of stairs 2x with use of railing on right for ascending and descending. Pt is steady with straight path uninterrupted gait, no path deviations noted this session. Pt scored 17/24 on DGI indicating increased risk for falls, impaired dynamic balance- see note.  Barriers to return to prior living situation: Impaired balance, Fall risk  Recommendations for discharge: Home with OP PT  Rationale for recommendations: Pt improving with mobility, appears safe for household and limited community mobility - may benefit from use of AD in community, will continue to monitor. Pt will benefit from OP PT to progress high level balance and reduce falls risk.       Entered by: Susanna Elizabeth 02/17/2020 10:17 AM

## 2020-02-18 ENCOUNTER — APPOINTMENT (OUTPATIENT)
Dept: PHYSICAL THERAPY | Facility: CLINIC | Age: 65
DRG: 433 | End: 2020-02-18
Payer: MEDICARE

## 2020-02-18 PROCEDURE — 97116 GAIT TRAINING THERAPY: CPT | Mod: GP | Performed by: PHYSICAL THERAPIST

## 2020-02-18 PROCEDURE — 99231 SBSQ HOSP IP/OBS SF/LOW 25: CPT | Performed by: INTERNAL MEDICINE

## 2020-02-18 PROCEDURE — 25000132 ZZH RX MED GY IP 250 OP 250 PS 637: Mod: GY | Performed by: PSYCHIATRY & NEUROLOGY

## 2020-02-18 PROCEDURE — 97112 NEUROMUSCULAR REEDUCATION: CPT | Mod: GP | Performed by: PHYSICAL THERAPIST

## 2020-02-18 PROCEDURE — 25000132 ZZH RX MED GY IP 250 OP 250 PS 637: Mod: GY | Performed by: INTERNAL MEDICINE

## 2020-02-18 PROCEDURE — 12000000 ZZH R&B MED SURG/OB

## 2020-02-18 PROCEDURE — 25000132 ZZH RX MED GY IP 250 OP 250 PS 637: Mod: GY | Performed by: PHYSICIAN ASSISTANT

## 2020-02-18 PROCEDURE — 25000132 ZZH RX MED GY IP 250 OP 250 PS 637: Mod: GY | Performed by: HOSPITALIST

## 2020-02-18 RX ADMIN — THIAMINE HCL TAB 100 MG 100 MG: 100 TAB at 09:34

## 2020-02-18 RX ADMIN — FUROSEMIDE 20 MG: 20 TABLET ORAL at 09:34

## 2020-02-18 RX ADMIN — DICYCLOMINE HYDROCHLORIDE 20 MG: 20 TABLET ORAL at 16:33

## 2020-02-18 RX ADMIN — PROPRANOLOL HYDROCHLORIDE 10 MG: 10 TABLET ORAL at 09:34

## 2020-02-18 RX ADMIN — MIRTAZAPINE 15 MG: 15 TABLET, FILM COATED ORAL at 22:13

## 2020-02-18 RX ADMIN — PANTOPRAZOLE SODIUM 40 MG: 40 TABLET, DELAYED RELEASE ORAL at 07:35

## 2020-02-18 RX ADMIN — SPIRONOLACTONE 50 MG: 25 TABLET ORAL at 09:33

## 2020-02-18 RX ADMIN — DULOXETINE HYDROCHLORIDE 30 MG: 30 CAPSULE, DELAYED RELEASE ORAL at 09:33

## 2020-02-18 RX ADMIN — DICYCLOMINE HYDROCHLORIDE 20 MG: 20 TABLET ORAL at 22:12

## 2020-02-18 RX ADMIN — LACTULOSE 10 G: 10 SOLUTION ORAL at 09:32

## 2020-02-18 RX ADMIN — FLUTICASONE FUROATE AND VILANTEROL TRIFENATATE 1 PUFF: 200; 25 POWDER RESPIRATORY (INHALATION) at 09:34

## 2020-02-18 RX ADMIN — TRAZODONE HYDROCHLORIDE 200 MG: 50 TABLET ORAL at 22:12

## 2020-02-18 RX ADMIN — LACTULOSE 10 G: 10 SOLUTION ORAL at 20:47

## 2020-02-18 RX ADMIN — NICOTINE 1 PATCH: 14 PATCH, EXTENDED RELEASE TRANSDERMAL at 09:35

## 2020-02-18 RX ADMIN — MULTIPLE VITAMINS W/ MINERALS TAB 1 TABLET: TAB at 09:34

## 2020-02-18 RX ADMIN — FOLIC ACID 1 MG: 1 TABLET ORAL at 09:34

## 2020-02-18 RX ADMIN — DICYCLOMINE HYDROCHLORIDE 20 MG: 20 TABLET ORAL at 07:35

## 2020-02-18 ASSESSMENT — ACTIVITIES OF DAILY LIVING (ADL)
ADLS_ACUITY_SCORE: 18

## 2020-02-18 NOTE — PROGRESS NOTES
SW:  The OT note from 2/17 which documented the SLUMS result was faxed to both [facility] and [facility].  Writer spoke with Jim at [facility] who wishes to proceed with evaluating patient for their program.

## 2020-02-18 NOTE — PLAN OF CARE
Cognitive Concerns/ Orientation : A&Ox4   BEHAVIOR & AGGRESSION TOOL COLOR: Green  CIWA SCORE: NA   ABNL VS/O2: Armando HR 50s, other VSS on RA  MOBILITY: SBA  PAIN MANAGMENT: Denies any pain  DIET: 2 gm NA   BOWEL/BLADDER: Continent  ABNL LAB/BG: Ct 1.38  DRAIN/DEVICES: PIV  TELEMETRY RHYTHM:   SKIN: Scabs, bruises  TESTS/PROCEDURES:   D/C DAY/GOALS/PLACE: Pending Placement  OTHER IMPORTANT INFO:

## 2020-02-18 NOTE — PLAN OF CARE
Discharge Planner PT   Patient plan for discharge: TCU and then CD treatment  Current status: Pt able to perform bed mobility and sit to stands independently. Pt able to ambulate 140' with no AD and IND. Pt able to increase speed with no LOB or gait deviations. Pt stated that his baseline is slow cathy and believes he is back to baseline with ambulation. Pt had improvements with balance today but continues to have instability with semi and modified tandem stance, SLS, NBOS stance and continues to need standing rest breaks between balance activities.   Barriers to return to prior living situation: Impaired balance, Fall risk  Recommendations for discharge: Home with OP PT  Rationale for recommendations: Pt improving with mobility, appears safe for household and limited community mobility - may benefit from use of AD in community, will continue to monitor. Pt will benefit from OP PT to progress high level balance and reduce falls risk.       Entered by: Susanna Elizabeth 02/18/2020 9:47 AM

## 2020-02-18 NOTE — PLAN OF CARE
DATE & TIME: 2/18/2020    Cognitive Concerns/ Orientation : AOx4   BEHAVIOR & AGGRESSION TOOL COLOR: Green  CIWA SCORE: NA   ABNL VS/O2: HR-51, BP-104/59  MOBILITY: Independent. Ambulates in muniz frequently.  PAIN MANAGMENT: Denies pain  DIET: 2 G sodium. Tolerates well.  BOWEL/BLADDER: Continent of stool and urine  ABNL LAB/BG: NA  DRAIN/DEVICES: L PIV saline locked  TELEMETRY RHYTHM: NA  SKIN: Scabs  TESTS/PROCEDURES: NA  D/C DAY/GOALS/PLACE: discharge pending placement at inpatient treatment center  OTHER IMPORTANT INFO: Edema on lower extremities.

## 2020-02-18 NOTE — PROGRESS NOTES
[facility]  Gastroenterology Progress Note     Jim Richard MRN# [id number]   YOB: 1955 Age: 64 year old          Assessment and Plan:   Update: Patient had stable hemoglobin and platelets.  Abdomen soft. Having 2-4 bowel movements daily. NO report of melena. Creatinine has improved with decrease in diuretics.      Active Problems:   Alcohol withdrawal (H)  Alcohol liver cirrhosis with ascites and esophageal varices  Jim Richard is a pleasant 64 year old male with history of alcohol abuse, alcoholic liver cirrhosis with ascites and esophageal varices with diarrhea for 3 days. GI consulted for continuation of care and history of cirrhosis. Patient has had exacerbation of symptoms associated with excessive alcohol intake. Underwent ultrasound guided paracentesis and had 6750 mL of serous fluid removed. Hemoglobin stable and at baseline. Has been noncompliant with medication. Has been started on propranolol with history of esophageal varices. Has had lactulose held with diarrhea and stools incontinence and diarrhea. Having 2-4 stools a day.      - Recommend to consider inpatient alcohol treatment- SW attempting to find TCU-given 4 admissions in last year for alcohol related problems  - Continue with spironolactone to 50 mg and furosemide 20 mg daily- creatinine trending down   will continue with one tablespoon lactulose a day 10g/15mL  - Continue with pantoprazole and propranolol  - Continue on dicyclomine for abdominal cramping  - If abdominal distention worse can plan paracentesis            Alcoholic cirrhosis of liver with ascites (H)  Alcoholic intoxication with complication (H)  Suicidal ideation      Interval History:   no new complaints, doing well, denies chest pain, denies shortness of breath, alert, oriented to person, place and time, has had a bowel movement in the last 24 hours and doing well; no cp, sob, n/v/d, or abd pain.              Review of Systems:   C:  NEGATIVE for fever, chills, change in weight  E/M: NEGATIVE for ear, mouth and throat problems  R: NEGATIVE for significant cough or SOB  CV: NEGATIVE for chest pain, palpitations or peripheral edema             Medications:   I have reviewed this patient's current medications    dicyclomine  20 mg Oral 4x Daily AC & HS     DULoxetine  30 mg Oral Daily     fluticasone-vilanterol  1 puff Inhalation Daily     folic acid  1 mg Oral Daily     furosemide  20 mg Oral Daily     lactulose  10 g Oral BID     mirtazapine  15 mg Oral At Bedtime     multivitamin w/minerals  1 tablet Oral Daily     nicotine  1 patch Transdermal Daily     nicotine   Transdermal Q8H     pantoprazole  40 mg Oral QAM AC     propranolol  10 mg Oral BID     spironolactone  50 mg Oral Daily     traZODone  200 mg Oral At Bedtime     vitamin B1  100 mg Oral Daily                  Physical Exam:   Vitals were reviewed  Vital Signs with Ranges  Temp:  [98.4  F (36.9  C)-98.6  F (37  C)] 98.4  F (36.9  C)  Pulse:  [54] 54  Heart Rate:  [44-54] 51  Resp:  [16-18] 16  BP: (104-130)/(59-72) 104/59  SpO2:  [95 %-99 %] 95 %  I/O last 3 completed shifts:  In: 480 [P.O.:480]  Out: -   Constitutional: healthy, alert and no distress   Cardiovascular: negative, PMI normal. No lifts, heaves, or thrills. RRR. No murmurs, clicks gallops or rub  Respiratory: negative, Percussion normal. Good diaphragmatic excursion. Lungs clear  Head: Normocephalic. No masses, lesions, tenderness or abnormalities  Neck: Neck supple. No adenopathy. Thyroid symmetric, normal size,, Carotids without bruits.  Abdomen: Abdomen soft, non-tender. BS normal. No masses, organomegaly           Data:   I reviewed the patient's new clinical lab test results.   Recent Labs   Lab Test 02/08/20  0735 02/07/20  0758 02/06/20  1413 02/06/20  0946 02/06/20  0805  02/03/20  0218   WBC 3.3* 3.4*  --   --  3.9*   < > 7.9   HGB 8.8* 8.2* 8.6*  --  8.4*   < > 9.6*   MCV 96 97  --   --  97   < > 97   PLT 67*  58*  --   --  49*   < > 153   INR  --  1.36*  --  1.29*  --   --  1.18*    < > = values in this interval not displayed.     Recent Labs   Lab Test 02/17/20  0904 02/13/20  1224 02/12/20  0944   POTASSIUM 4.2 4.4 4.5   CHLORIDE 102 105 103   CO2 23 26 23   BUN 23 27 27   ANIONGAP 7 3 7     Recent Labs   Lab Test 02/07/20  0758 02/04/20  0800 02/03/20  0218  01/11/20  1730  11/13/19  1858 11/13/19  1849  02/13/19  2340  09/25/14  0510   ALBUMIN 2.2* 2.3* 2.7*   < >  --    < > 3.0*  --    < > 3.1*   < >  --    BILITOTAL 1.0 2.5* 1.6*   < >  --    < > 1.0  --    < > 0.4   < >  --    ALT 19 22 26   < >  --    < > 24  --    < > 41   < >  --    AST 37 53* 61*   < >  --    < > 58*  --    < > 89*   < >  --    PROTEIN  --   --   --   --  Negative  --   --  30*  --   --   --  Negative   LIPASE  --   --  369  --   --   --  499*  --   --  536*   < >  --     < > = values in this interval not displayed.       I reviewed the patient's new imaging results.    All laboratory data reviewed  All imaging studies reviewed by me.    Gloria De Leon PA-C,  2/18/2020  [facility]  Office : [phone]  Cell: [phone] (Dr. Shaw)  Cell: [phone] (Gloria De Leon PA-C)

## 2020-02-19 ENCOUNTER — APPOINTMENT (OUTPATIENT)
Dept: OCCUPATIONAL THERAPY | Facility: CLINIC | Age: 65
DRG: 433 | End: 2020-02-19
Payer: MEDICARE

## 2020-02-19 PROCEDURE — 25000132 ZZH RX MED GY IP 250 OP 250 PS 637: Mod: GY | Performed by: PHYSICIAN ASSISTANT

## 2020-02-19 PROCEDURE — 25000132 ZZH RX MED GY IP 250 OP 250 PS 637: Mod: GY | Performed by: PSYCHIATRY & NEUROLOGY

## 2020-02-19 PROCEDURE — 25000132 ZZH RX MED GY IP 250 OP 250 PS 637: Mod: GY | Performed by: INTERNAL MEDICINE

## 2020-02-19 PROCEDURE — 12000000 ZZH R&B MED SURG/OB

## 2020-02-19 PROCEDURE — 25000132 ZZH RX MED GY IP 250 OP 250 PS 637: Mod: GY | Performed by: HOSPITALIST

## 2020-02-19 PROCEDURE — 97535 SELF CARE MNGMENT TRAINING: CPT | Mod: GO | Performed by: OCCUPATIONAL THERAPY ASSISTANT

## 2020-02-19 PROCEDURE — 99231 SBSQ HOSP IP/OBS SF/LOW 25: CPT | Performed by: INTERNAL MEDICINE

## 2020-02-19 PROCEDURE — 97530 THERAPEUTIC ACTIVITIES: CPT | Mod: GO | Performed by: OCCUPATIONAL THERAPY ASSISTANT

## 2020-02-19 RX ADMIN — FLUTICASONE FUROATE AND VILANTEROL TRIFENATATE 1 PUFF: 200; 25 POWDER RESPIRATORY (INHALATION) at 09:00

## 2020-02-19 RX ADMIN — MIRTAZAPINE 15 MG: 15 TABLET, FILM COATED ORAL at 21:07

## 2020-02-19 RX ADMIN — THIAMINE HCL TAB 100 MG 100 MG: 100 TAB at 08:57

## 2020-02-19 RX ADMIN — MULTIPLE VITAMINS W/ MINERALS TAB 1 TABLET: TAB at 08:56

## 2020-02-19 RX ADMIN — LACTULOSE 10 G: 10 SOLUTION ORAL at 21:08

## 2020-02-19 RX ADMIN — DULOXETINE HYDROCHLORIDE 30 MG: 30 CAPSULE, DELAYED RELEASE ORAL at 08:56

## 2020-02-19 RX ADMIN — SPIRONOLACTONE 50 MG: 25 TABLET ORAL at 08:56

## 2020-02-19 RX ADMIN — LACTULOSE 10 G: 10 SOLUTION ORAL at 08:57

## 2020-02-19 RX ADMIN — FOLIC ACID 1 MG: 1 TABLET ORAL at 08:56

## 2020-02-19 RX ADMIN — DICYCLOMINE HYDROCHLORIDE 20 MG: 20 TABLET ORAL at 16:07

## 2020-02-19 RX ADMIN — FUROSEMIDE 20 MG: 20 TABLET ORAL at 08:56

## 2020-02-19 RX ADMIN — PANTOPRAZOLE SODIUM 40 MG: 40 TABLET, DELAYED RELEASE ORAL at 08:06

## 2020-02-19 RX ADMIN — NICOTINE 1 PATCH: 14 PATCH, EXTENDED RELEASE TRANSDERMAL at 08:54

## 2020-02-19 RX ADMIN — DICYCLOMINE HYDROCHLORIDE 20 MG: 20 TABLET ORAL at 21:08

## 2020-02-19 RX ADMIN — DICYCLOMINE HYDROCHLORIDE 20 MG: 20 TABLET ORAL at 12:09

## 2020-02-19 RX ADMIN — DICYCLOMINE HYDROCHLORIDE 20 MG: 20 TABLET ORAL at 08:06

## 2020-02-19 RX ADMIN — TRAZODONE HYDROCHLORIDE 200 MG: 50 TABLET ORAL at 21:08

## 2020-02-19 RX ADMIN — PROPRANOLOL HYDROCHLORIDE 10 MG: 10 TABLET ORAL at 08:56

## 2020-02-19 ASSESSMENT — ACTIVITIES OF DAILY LIVING (ADL)
ADLS_ACUITY_SCORE: 18
ADLS_ACUITY_SCORE: 16

## 2020-02-19 NOTE — PLAN OF CARE
Discharge Planner OT   Patient plan for discharge: CD treatment  Current status: pt was located in hallway amb without AD independently, pt completed toileting, at sink hygiene and FB dressing independently.   Barriers to return to prior living situation: pt is at high risk for relapse, general safety, reduced IND in I/ADLs.   Recommendations for discharge: IP CD treatment with OP OT after per plan established by the Occupational Therapist.   Rationale for recommendations: pt would benefit from skilled services to return to PLOF/driving, pt appropriate to discharge to IP CD unit. Pt would benefit from in depth cog testing and driving skills assessment in an OP OT setting; thus, recommend A for transportation until that time.       Entered by: Krista Dolan 02/19/2020 9:32 AM      Per DANIELE asking to redo cognitive assessment due to difficulty with placement into CD tx. Discussed completing the Cognistat, will schedule pt for assessement on 2/22/2020

## 2020-02-19 NOTE — PROGRESS NOTES
SW:  D:  Writer spoke with [facility] today for an update .[phone], #1, #1.  Spoke with Jim in intake.  He stated he is reviewing patient's record and will let writer know by Thursday if the clinically accept patient and then when they anticipate a vacancy.  Patient updated.

## 2020-02-19 NOTE — PLAN OF CARE
Cognitive Concerns/ Orientation : A&Ox4   BEHAVIOR & AGGRESSION TOOL COLOR: Green  CIWA SCORE: NA   ABNL VS/O2: Bradycardic  MOBILITY: Independent  PAIN MANAGMENT: Denies any pain  DIET: 2gm Na  BOWEL/BLADDER: Continent  ABNL LAB/BG:   DRAIN/DEVICES: PIV  TELEMETRY RHYTHM: NA  SKIN: Bruises, scabs  TESTS/PROCEDURES:   D/C DAY/GOALS/PLACE: Pending placement  OTHER IMPORTANT INFO: Nicotine patch JESSICA

## 2020-02-19 NOTE — PROGRESS NOTES
[facility]    Hospitalist Progress Note      Assessment & Plan     Jim Richard is a 64 year old male who was admitted on 2/3/2020. His history is significant for cirrhosis (w/ ascites, esophageal varices), EtOH use disorder, depression/anxiety, COPD, sleep apnea, anemia, admitted for EtOH intoxication and SI.    He was recently hospitalized at [facility] for weakness in EtOH withdrawal, and was seen by GI, CD, and Psych. It appears the initial plan was for him to go to TCU w/ close GI f/u to adjust his diuretics, but he refused TCU and went home. Since going home he has been drinking, medication compliance sporadic, with worsening abdominal ascites. He came in for SI in the setting of drinking.      Alcohol Withdrawal: has had 4 alcohol-related admission in the past year  - CD recs: patient will need to go to TCU first, then set up outpatient rehab  - Psychiatry recs: no indication to pursue commitment at this time  - cont daily thiamine, folate, MVI    Alcoholic Cirrhosis with Esophageal Varices  - GI recs:   - Spironolactone 50 mg PO daily   - Lasix 20 mg PO daily   - Propranolol   - Pantoprazole   - Lactulose 10 g PO BID, titrate to 2-3 BMs per day   - dicyclomine for abd cramping     Anemia with hx of Esophageal Varices  - f/u GI recs     Depression with Suicidal Ideation   Reported SI prior to hospitalization although no further SI noted after arrival  - Psychiatry assistance  - cont Duloxetine, Mirtazapine, Trazodone, prn Seroquel    FEN  - low Na diet     Dispo  - pending [facility] chemical dependency placement      DVT Prophylaxis: Pneumatic Compression Devices  Code Status: DNR/DNI  Expected discharge: 2-3 days, pending bed availability at [facility] dependency      Efrain Aceves MD  Text Page (7am - 6pm, M-F)    Interval History   Ambulating comfortably around hallway this morning. Feeling well. No new symptoms.    -Data reviewed today: I reviewed all new labs and imaging  results over the last 24 hours.      Physical Exam   Temp: 98.3  F (36.8  C) Temp src: Oral BP: 116/68   Heart Rate: 50 Resp: 18 SpO2: 97 % O2 Device: None (Room air)    Vitals:    02/03/20 0800   Weight: 89.6 kg (197 lb 8.5 oz)     Vital Signs with Ranges  Temp:  [97.3  F (36.3  C)-98.5  F (36.9  C)] 98.3  F (36.8  C)  Heart Rate:  [49-50] 50  Resp:  [16-18] 18  BP: (115-130)/(59-69) 116/68  SpO2:  [96 %-99 %] 97 %  I/O last 3 completed shifts:  In: 480 [P.O.:480]  Out: -       General: well-appearing, NAD  HEENT: NC/AT, moist mucus membranes  Heart: RRR no m/r/g  Lungs: CTAB no crackles or wheezes  Abdomen: NABS, non-tender, +distended; no rebound or guarding; no HSM or masses.  Extremities: warm, well-perfused. 1+ bilateral ankle pitting edema.  Neuro: CN II-XII grossly intact, moving extremities spontaneously    Medications       dicyclomine  20 mg Oral 4x Daily AC & HS     DULoxetine  30 mg Oral Daily     fluticasone-vilanterol  1 puff Inhalation Daily     folic acid  1 mg Oral Daily     furosemide  20 mg Oral Daily     lactulose  10 g Oral BID     mirtazapine  15 mg Oral At Bedtime     multivitamin w/minerals  1 tablet Oral Daily     nicotine  1 patch Transdermal Daily     nicotine   Transdermal Q8H     pantoprazole  40 mg Oral QAM AC     propranolol  10 mg Oral BID     spironolactone  50 mg Oral Daily     traZODone  200 mg Oral At Bedtime     vitamin B1  100 mg Oral Daily       Data   Recent Labs   Lab 02/17/20  0904 02/13/20  1224   * 134   POTASSIUM 4.2 4.4   CHLORIDE 102 105   CO2 23 26   BUN 23 27   CR 1.38* 1.45*   ANIONGAP 7 3   KEV 9.0 9.0   * 102*       No results found for this or any previous visit (from the past 24 hour(s)).

## 2020-02-20 ENCOUNTER — APPOINTMENT (OUTPATIENT)
Dept: PHYSICAL THERAPY | Facility: CLINIC | Age: 65
DRG: 433 | End: 2020-02-20
Payer: MEDICARE

## 2020-02-20 PROCEDURE — 97110 THERAPEUTIC EXERCISES: CPT | Mod: GP | Performed by: PHYSICAL THERAPIST

## 2020-02-20 PROCEDURE — 25000132 ZZH RX MED GY IP 250 OP 250 PS 637: Mod: GY | Performed by: INTERNAL MEDICINE

## 2020-02-20 PROCEDURE — 97116 GAIT TRAINING THERAPY: CPT | Mod: GP | Performed by: PHYSICAL THERAPIST

## 2020-02-20 PROCEDURE — 25000132 ZZH RX MED GY IP 250 OP 250 PS 637: Mod: GY | Performed by: PSYCHIATRY & NEUROLOGY

## 2020-02-20 PROCEDURE — 99232 SBSQ HOSP IP/OBS MODERATE 35: CPT | Performed by: INTERNAL MEDICINE

## 2020-02-20 PROCEDURE — 25000132 ZZH RX MED GY IP 250 OP 250 PS 637: Mod: GY | Performed by: PHYSICIAN ASSISTANT

## 2020-02-20 PROCEDURE — 99207 ZZC CDG-MDM COMPONENT: MEETS MODERATE - UP CODED: CPT | Performed by: INTERNAL MEDICINE

## 2020-02-20 PROCEDURE — 12000000 ZZH R&B MED SURG/OB

## 2020-02-20 PROCEDURE — 25000132 ZZH RX MED GY IP 250 OP 250 PS 637: Mod: GY | Performed by: HOSPITALIST

## 2020-02-20 RX ADMIN — THIAMINE HCL TAB 100 MG 100 MG: 100 TAB at 09:38

## 2020-02-20 RX ADMIN — DICYCLOMINE HYDROCHLORIDE 20 MG: 20 TABLET ORAL at 16:13

## 2020-02-20 RX ADMIN — TRAZODONE HYDROCHLORIDE 200 MG: 50 TABLET ORAL at 21:48

## 2020-02-20 RX ADMIN — LACTULOSE 10 G: 10 SOLUTION ORAL at 09:38

## 2020-02-20 RX ADMIN — ACETAMINOPHEN 650 MG: 325 TABLET, FILM COATED ORAL at 19:16

## 2020-02-20 RX ADMIN — PANTOPRAZOLE SODIUM 40 MG: 40 TABLET, DELAYED RELEASE ORAL at 06:11

## 2020-02-20 RX ADMIN — DICYCLOMINE HYDROCHLORIDE 20 MG: 20 TABLET ORAL at 11:31

## 2020-02-20 RX ADMIN — FLUTICASONE FUROATE AND VILANTEROL TRIFENATATE 1 PUFF: 200; 25 POWDER RESPIRATORY (INHALATION) at 09:39

## 2020-02-20 RX ADMIN — DICYCLOMINE HYDROCHLORIDE 20 MG: 20 TABLET ORAL at 06:11

## 2020-02-20 RX ADMIN — DULOXETINE HYDROCHLORIDE 30 MG: 30 CAPSULE, DELAYED RELEASE ORAL at 09:38

## 2020-02-20 RX ADMIN — FUROSEMIDE 20 MG: 20 TABLET ORAL at 09:38

## 2020-02-20 RX ADMIN — MIRTAZAPINE 15 MG: 15 TABLET, FILM COATED ORAL at 21:48

## 2020-02-20 RX ADMIN — MULTIPLE VITAMINS W/ MINERALS TAB 1 TABLET: TAB at 09:38

## 2020-02-20 RX ADMIN — SPIRONOLACTONE 50 MG: 25 TABLET ORAL at 09:38

## 2020-02-20 RX ADMIN — DICYCLOMINE HYDROCHLORIDE 20 MG: 20 TABLET ORAL at 21:48

## 2020-02-20 RX ADMIN — NICOTINE 1 PATCH: 14 PATCH, EXTENDED RELEASE TRANSDERMAL at 09:39

## 2020-02-20 RX ADMIN — FOLIC ACID 1 MG: 1 TABLET ORAL at 09:38

## 2020-02-20 RX ADMIN — LACTULOSE 10 G: 10 SOLUTION ORAL at 21:48

## 2020-02-20 ASSESSMENT — ACTIVITIES OF DAILY LIVING (ADL)
ADLS_ACUITY_SCORE: 16
ADLS_ACUITY_SCORE: 18
ADLS_ACUITY_SCORE: 16

## 2020-02-20 NOTE — PLAN OF CARE
Nursing note  Pt a/o x 4, up independently in the room. Pt denies any dizziness or lightheadedness. Pt denies any pain. Pt denies any n/v. Nicotine patch on the right deltoid. Scheduled inderal was held for HR of 47 at bedtime. Pt awaiting for awaiting placement. PIV on the left. Will continue to monitor.

## 2020-02-20 NOTE — PLAN OF CARE
Discharge Planner PT   Patient plan for discharge: TCU and then CD treatment  Current status:   Pt ambulated 30' with no AD and IND, pt had improved cathy today. Attempted ambulating with SEC, pt had decreased cathy. Pt reported he would not use the SEC and SPT agreed that there was no need for it. Performed standing LE exercises that were given to pt for HEP. Pt has increased unsteadiness resulting in LOB with NBOS EC, semi tandem, and modified tandem.    Barriers to return to prior living situation: Impaired balance, Fall risk  Recommendations for discharge: Home with OP PT  Rationale for recommendations: Pt improving with mobility, appears safe for household and limited community mobility - may benefit from use of AD in community, will continue to monitor. Pt will benefit from OP PT to progress high level balance and reduce falls risk. Pt was given HEP handout for strength and balance ex to continue at IP CD rather than OP PT.       Entered by: uSsanna Elizabeth 02/20/2020 12:27 PM

## 2020-02-20 NOTE — PLAN OF CARE
DATE & TIME: 2/20 Day       Cognitive Concerns/ Orientation : A&Ox4   BEHAVIOR & AGGRESSION TOOL COLOR: Green  CIWA SCORE: n/a  ABNL VS/O2: VSS on RA except bradycardic, sched Propranolol held per order. Uses CPAP at night.  MOBILITY: Independent, walks frequently  PAIN MANAGMENT: Reported back pain, declined intervention.  DIET: 2 gram sodium, good appetite.  BOWEL/BLADDER: Continent, using BR.  ABNL LAB/BG: n/a  DRAIN/DEVICES: PIV SL  TELEMETRY RHYTHM: n/a  SKIN: Bruises and scabs.   TESTS/PROCEDURES: n/a  D/C DAY/GOALS/PLACE: Pending CD placement, SW following.

## 2020-02-20 NOTE — PLAN OF CARE
DATE & TIME: [id number]; 2/20/2020   Cognitive Concerns/ Orientation : A & O x 4; calm and cooperative   BEHAVIOR & AGGRESSION TOOL COLOR: Green   CIWA SCORE: N/A   ABNL VS/O2: BP= 152/73 (elevated); pulse= 47; other VSS on room air  MOBILITY: Independent; ambulated in the halls x 1   PAIN MANAGMENT: pt. Reports pain 6/10; tylenol given x 1. Pain remained the same after pain interventions  DIET: Regular; tolerating well  BOWEL/BLADDER: BS active x 4; continent of bowel and bladder   ABNL LAB/BG: Na+= 132, creatinine= 1.38   DRAIN/DEVICES: PIV/SL   TELEMETRY RHYTHM: N/A  SKIN: Dry, scattered bruising UE bilaterally, abrasions on arms; Swelling in left leg.  TESTS/PROCEDURES: N/A  D/C DAY/GOALS/PLACE: pending discharge placement to chemical dependency  OTHER IMPORTANT INFO: G.I following, wears CIPAP machine at night.

## 2020-02-21 ENCOUNTER — APPOINTMENT (OUTPATIENT)
Dept: PHYSICAL THERAPY | Facility: CLINIC | Age: 65
DRG: 433 | End: 2020-02-21
Payer: MEDICARE

## 2020-02-21 PROCEDURE — 12000000 ZZH R&B MED SURG/OB

## 2020-02-21 PROCEDURE — 25000132 ZZH RX MED GY IP 250 OP 250 PS 637: Mod: GY | Performed by: PSYCHIATRY & NEUROLOGY

## 2020-02-21 PROCEDURE — 99232 SBSQ HOSP IP/OBS MODERATE 35: CPT | Performed by: INTERNAL MEDICINE

## 2020-02-21 PROCEDURE — 25000132 ZZH RX MED GY IP 250 OP 250 PS 637: Mod: GY | Performed by: INTERNAL MEDICINE

## 2020-02-21 PROCEDURE — 99207 ZZC CDG-MDM COMPONENT: MEETS MODERATE - UP CODED: CPT | Performed by: INTERNAL MEDICINE

## 2020-02-21 PROCEDURE — 97110 THERAPEUTIC EXERCISES: CPT | Mod: GP | Performed by: PHYSICAL THERAPIST

## 2020-02-21 PROCEDURE — 97112 NEUROMUSCULAR REEDUCATION: CPT | Mod: GP | Performed by: PHYSICAL THERAPIST

## 2020-02-21 PROCEDURE — 25000132 ZZH RX MED GY IP 250 OP 250 PS 637: Mod: GY | Performed by: PHYSICIAN ASSISTANT

## 2020-02-21 PROCEDURE — 25000132 ZZH RX MED GY IP 250 OP 250 PS 637: Mod: GY | Performed by: HOSPITALIST

## 2020-02-21 RX ORDER — DULOXETIN HYDROCHLORIDE 60 MG/1
60 CAPSULE, DELAYED RELEASE ORAL DAILY
Status: DISCONTINUED | OUTPATIENT
Start: 2020-02-22 | End: 2020-02-26 | Stop reason: HOSPADM

## 2020-02-21 RX ADMIN — DICYCLOMINE HYDROCHLORIDE 20 MG: 20 TABLET ORAL at 07:59

## 2020-02-21 RX ADMIN — ACETAMINOPHEN 650 MG: 325 TABLET, FILM COATED ORAL at 19:42

## 2020-02-21 RX ADMIN — DICYCLOMINE HYDROCHLORIDE 20 MG: 20 TABLET ORAL at 12:41

## 2020-02-21 RX ADMIN — FUROSEMIDE 20 MG: 20 TABLET ORAL at 08:08

## 2020-02-21 RX ADMIN — LACTULOSE 10 G: 10 SOLUTION ORAL at 08:08

## 2020-02-21 RX ADMIN — DICYCLOMINE HYDROCHLORIDE 20 MG: 20 TABLET ORAL at 21:12

## 2020-02-21 RX ADMIN — DULOXETINE HYDROCHLORIDE 30 MG: 30 CAPSULE, DELAYED RELEASE ORAL at 08:08

## 2020-02-21 RX ADMIN — FOLIC ACID 1 MG: 1 TABLET ORAL at 08:08

## 2020-02-21 RX ADMIN — FLUTICASONE FUROATE AND VILANTEROL TRIFENATATE 1 PUFF: 200; 25 POWDER RESPIRATORY (INHALATION) at 08:09

## 2020-02-21 RX ADMIN — PROPRANOLOL HYDROCHLORIDE 10 MG: 10 TABLET ORAL at 21:11

## 2020-02-21 RX ADMIN — TRAZODONE HYDROCHLORIDE 200 MG: 50 TABLET ORAL at 21:12

## 2020-02-21 RX ADMIN — ACETAMINOPHEN 650 MG: 325 TABLET, FILM COATED ORAL at 13:49

## 2020-02-21 RX ADMIN — MIRTAZAPINE 15 MG: 15 TABLET, FILM COATED ORAL at 21:12

## 2020-02-21 RX ADMIN — LACTULOSE 10 G: 10 SOLUTION ORAL at 21:13

## 2020-02-21 RX ADMIN — MULTIPLE VITAMINS W/ MINERALS TAB 1 TABLET: TAB at 08:08

## 2020-02-21 RX ADMIN — NICOTINE 1 PATCH: 14 PATCH, EXTENDED RELEASE TRANSDERMAL at 08:09

## 2020-02-21 RX ADMIN — PANTOPRAZOLE SODIUM 40 MG: 40 TABLET, DELAYED RELEASE ORAL at 07:59

## 2020-02-21 RX ADMIN — DICYCLOMINE HYDROCHLORIDE 20 MG: 20 TABLET ORAL at 15:40

## 2020-02-21 RX ADMIN — THIAMINE HCL TAB 100 MG 100 MG: 100 TAB at 08:08

## 2020-02-21 RX ADMIN — SPIRONOLACTONE 50 MG: 25 TABLET ORAL at 08:08

## 2020-02-21 ASSESSMENT — ACTIVITIES OF DAILY LIVING (ADL)
ADLS_ACUITY_SCORE: 18

## 2020-02-21 NOTE — PLAN OF CARE
DATE & TIME: 2/21 Day       Cognitive Concerns/ Orientation : A&Ox4   BEHAVIOR & AGGRESSION TOOL COLOR: Green  CIWA SCORE: n/a  ABNL VS/O2: VSS on RA except bradycardic, sched Propranolol held per order. Uses CPAP at night.  MOBILITY: Independent, walks frequently  PAIN MANAGMENT: Reported back pain, PRN Tylenol once.  DIET: 2 gram sodium, good appetite.  BOWEL/BLADDER: Continent, using BR. 2 BMs   ABNL LAB/BG: n/a  DRAIN/DEVICES: PIV SL  TELEMETRY RHYTHM: n/a  SKIN: Bruises and scabs.   TESTS/PROCEDURES: n/a  D/C DAY/GOALS/PLACE: TBD- medically stable, SW following.   OTHER INFORMATION: New psych and CD consults, as pt is requesting to go home instead of treatment.

## 2020-02-21 NOTE — PLAN OF CARE
Physical Therapy Discharge Summary    Reason for therapy discharge:    All goals and outcomes met, no further needs identified.    Progress towards therapy goal(s). See goals on Care Plan in [facility] electronic health record for goal details.  Goals met    Therapy recommendation(s):    Continue home exercise program.

## 2020-02-21 NOTE — PROGRESS NOTES
"SW  D:  Writer reached Jim, CD Intake by calling [phone].  Writer asked for an update regarding whether [facility] will clinically accept patient once they have a bed.   Jim reports their medical doctor is in process of reviewing hospital records and Jim doesn't expect a decision till Monday.  Then if patient is accepted it will be the first week in March before they can offer patient a bed.  Jim has received some feedback from the medical staff regarding concern with patient's SLUM score which scored in the dementia range indicating short term memory impairment and executive functioning.      Writer spoke with Krista BRADLEY who indicates a COGNISTAT is a more in depth cognitive assessment which could be provided to patient tomorrow if he is still here.     Immediately following phone conversation with Jim at [facility], patient approached writer and stated \"they don't want me?\"  Writer did explain his referral is still being reviewed however writer did tell him they are concerned with his SLUMS score.  Patient stated \"I do have problems with my memory\".    Patient also stated \"I'm ready to go home\".  We returned to his room to talk about his plan.  During previous stays, patient at discharge expresses commitment to stay sober while awaiting treatment however relapses.  Writer asked what is plan will be to not drink.  \"I have to not drink or it will kill me\".  Patient shares he has been prescribe a medication to help with cravings however has not taken it.   Patient does have a roommate who works evenings.  They share housing expenses and share making meals.  Patient manages his finances.  He states the roommate doesn't drink and when he wants alcohol  he drives to purchase the alcohol.  Writer asked if staying with his brother would be a more supportive environment and he responded \"not really\".    We discussed alternative CD programs in which the [place] [employer] [place] funding pays for treatment " however he is above the guides of 16,661 for 2020.Thus the only insurance option he has is [facility] or [employer] and [place] declined due to his SLUMS scores.     A:  Patient is requesting to discharge home today.    Out patient OT can be ordered for  cognitive testing for more in depth cognitive testing and driving assessment.  Patient is up independently and PT and OT do not express concerns with discharge to home.  Patient is at high risk for relapse given his history.    Suggest CD re-visit to discuss harm reduction strategies.    PLAN:   Call placed to hospitalist to update him regarding patient's request to discharge home.   [facility] will be updated to follow up dire tlly with patient next week.  At patient request, [facility] also as a resource.

## 2020-02-21 NOTE — PROGRESS NOTES
[facility]    Medicine Progress Note - Hospitalist Service        Date of Admission:  2/3/2020  1:57 AM    Assessment & Plan:   Jim Richard is a 64 year old male who was admitted on 2/3/2020. His history is significant for cirrhosis (w/ ascites, esophageal varices), EtOH use disorder, depression/anxiety, COPD, sleep apnea, anemia, admitted for EtOH intoxication.  He was recently hospitalized at [facility] for weakness in EtOH withdrawal, and was seen by GI, CD, and Psych. It appears the initial plan was for him to go to TCU w/ close GI f/u to adjust his diuretics, but he refused TCU and went home. Since going home he has been drinking, medication compliance sporadic, with worsening abdominal ascites. He came in for SI in the setting of drinking.      Alcohol Withdrawal  -Multiple admissions and emergency room visits related to alcohol use problems  -CD initially recommended discharge to TCU first, and had a long discussion with social work today, apparently sent does well clinically except patient only wants to have a bed.  Even if the patient is accepted, it would be the first week of March before they can offer a bed to the patient.  -We will reconsult psychiatry and CD for their opinion regarding disposition  -cont daily thiamine, folate, MVI    Alcoholic Cirrhosis with Esophageal Varices  -Compensated  -Evaluated by gastroenterology  -Continue Spironolactone 50 mg PO daily, Lasix 20 mg PO daily, Propranolol, Pantoprazole, Lactulose 10 g PO BID, titrate to 2-3 BMs per day     Anemia with hx of Esophageal Varices  -Hemoglobin stable at this time, no plans for any invasive work-up.     Depression with Suicidal Ideation   Reported SI prior to hospitalization although no further SI noted after arrival  -Appreciate psychiatry assistance  -Continue duloxetine, Mirtazapine, Trazodone, prn Seroquel    Diet: Combination Diet 2 gm NA Diet  Snacks/Supplements Adult: Boost Plus; Between Meals     DVT  "Prophylaxis: Pneumatic Compression Devices   Leger Catheter: not present  Code Status: DNR/DNI     Disposition Plan    Expected discharge:Awaiting placement  Entered: Luis Bateman MD 02/21/2020, 1:25 PM        The patient's care was discussed with the Bedside Nurse and Patient.    Luis Bateman MD  Hospitalist Service  [facility]    ______________________________________________________________________    Interval History   No new complaints.  No acute nursing concerns.      Data reviewed today: I reviewed all medications, new labs and imaging results over the last 24 hours. I personally reviewed no images or EKG's today.    Physical Exam   Vital signs:  Temp: 97.8  F (36.6  C) Temp src: Oral BP: 111/61 Pulse: (!) 47 Heart Rate: (!) 47 Resp: 18 SpO2: 95 % O2 Device: None (Room air) Oxygen Delivery: 2 LPM Height: 170.2 cm (5' 7\") Weight: 89.6 kg (197 lb 8.5 oz)  Estimated body mass index is 30.94 kg/m  as calculated from the following:    Height as of this encounter: 1.702 m (5' 7\").    Weight as of this encounter: 89.6 kg (197 lb 8.5 oz).      Wt Readings from Last 2 Encounters:   02/03/20 89.6 kg (197 lb 8.5 oz)   01/16/20 83.5 kg (184 lb 1.4 oz)       Gen: AAOX1-2, NAD  Resp: CTA B/L, normal WOB  CVS: RRR, no murmur  Abd/GI: Soft, non-tender. BS- normoactive.    Neuro- CN- intact. No focal deficits.       Data   Recent Labs   Lab 02/17/20  0904   *   POTASSIUM 4.2   CHLORIDE 102   CO2 23   BUN 23   CR 1.38*   ANIONGAP 7   KEV 9.0   *       No results found for this or any previous visit (from the past 24 hour(s)).  Medications       dicyclomine  20 mg Oral 4x Daily AC & HS     DULoxetine  30 mg Oral Daily     DULoxetine  60 mg Oral Daily     fluticasone-vilanterol  1 puff Inhalation Daily     folic acid  1 mg Oral Daily     furosemide  20 mg Oral Daily     lactulose  10 g Oral BID     mirtazapine  15 mg Oral At Bedtime     multivitamin w/minerals  1 tablet Oral Daily     " nicotine  1 patch Transdermal Daily     nicotine   Transdermal Q8H     pantoprazole  40 mg Oral QAM AC     propranolol  10 mg Oral BID     spironolactone  50 mg Oral Daily     traZODone  200 mg Oral At Bedtime     vitamin B1  100 mg Oral Daily

## 2020-02-21 NOTE — CONSULTS
2/21/20 chem dep consult acknowledge and closed.      Spoke with unit , Bre BENNETT, and she updated me on referral for patient.  At this time, referrals are being denied due to patient's SLUMS scores and cognitive concerns.  We discussed some community referrals for patient should he discharge home.  We discussed case management services through [facility] and [facility], also [facility] which can provide recovery coaching and peer support services.      If MD is willing to petition for [facility] and [facility] supports, this could provide some additional accountability and case management to patient and we could look for some outpatient referrals and placement.  Patient has Medicare and is over income for Medicaid so formal treatment placement is limited.    Sherly Royal, [facility] [place]  [phone]

## 2020-02-21 NOTE — PROGRESS NOTES
SW  Correction to note by Sherly Sanchez, patient has been declined by [facility] however [facility] is still assessing him.  The results of the Cognistat can be faxed to [facility] as part of their assessing patient for admission.

## 2020-02-21 NOTE — PLAN OF CARE
DATE & TIME: 2/21/2020 2543-1173              Cognitive Concerns/ Orientation : A&O x4  BEHAVIOR & AGGRESSION TOOL COLOR: Green   CIWA SCORE: n/a  ABNL VS/O2: VSS on RA (CPAP overnight), except bradycardia (asymptomatic).   MOBILITY: Independent  PAIN MANAGMENT: denies pain   DIET: Regular  BOWEL/BLADDER: Continent, up to bathroom   ABNL LAB/BG: n/a  DRAIN/DEVICES: PIV, saline locked   TELEMETRY RHYTHM: n/a  SKIN: Intact, bruises.   TESTS/PROCEDURES: n/a  D/C DAY/GOALS/PLACE: pending CD placement   OTHER IMPORTANT INFO:

## 2020-02-21 NOTE — PROGRESS NOTES
02/20/20 1000   Signing Clinician's Name / Credentials   Signing clinician's name / credentials JIMMY Mishra    Dynamic Gait Index (Esteban and [employer] Bamberg, 1995)   Gait Level Surface 3   Change in Gait Speed 3   Gait and Horizontal Head Turns 2   Gait with Vertical Head Turns 3   Gait and Pivot Turns 2   Step Over Obstacle 3   Step Around Obstacles 3   Steps 1   Total Dynamic Gait Index Score  (A score of 19 or less has been correlated to an increased risk of falls in community dwelling older adults, patients with vestibular disorders, and patients with MS.)   Total Score (out of 24) 20

## 2020-02-21 NOTE — PROGRESS NOTES
SW  Call placed to [facility] treatment program this morning  asking for an update regarding the program ability to clinically accept patient.

## 2020-02-22 ENCOUNTER — APPOINTMENT (OUTPATIENT)
Dept: OCCUPATIONAL THERAPY | Facility: CLINIC | Age: 65
DRG: 433 | End: 2020-02-22
Payer: MEDICARE

## 2020-02-22 PROCEDURE — 25000132 ZZH RX MED GY IP 250 OP 250 PS 637: Mod: GY | Performed by: HOSPITALIST

## 2020-02-22 PROCEDURE — 99207 ZZC CDG-MDM COMPONENT: MEETS MODERATE - UP CODED: CPT | Performed by: INTERNAL MEDICINE

## 2020-02-22 PROCEDURE — 99232 SBSQ HOSP IP/OBS MODERATE 35: CPT | Performed by: INTERNAL MEDICINE

## 2020-02-22 PROCEDURE — 25000132 ZZH RX MED GY IP 250 OP 250 PS 637: Mod: GY | Performed by: PHYSICIAN ASSISTANT

## 2020-02-22 PROCEDURE — 97535 SELF CARE MNGMENT TRAINING: CPT | Mod: GO

## 2020-02-22 PROCEDURE — 25000132 ZZH RX MED GY IP 250 OP 250 PS 637: Mod: GY | Performed by: INTERNAL MEDICINE

## 2020-02-22 PROCEDURE — 99233 SBSQ HOSP IP/OBS HIGH 50: CPT | Performed by: PSYCHIATRY & NEUROLOGY

## 2020-02-22 PROCEDURE — 12000000 ZZH R&B MED SURG/OB

## 2020-02-22 RX ADMIN — DICYCLOMINE HYDROCHLORIDE 20 MG: 20 TABLET ORAL at 21:09

## 2020-02-22 RX ADMIN — NICOTINE 1 PATCH: 14 PATCH, EXTENDED RELEASE TRANSDERMAL at 08:59

## 2020-02-22 RX ADMIN — MIRTAZAPINE 15 MG: 15 TABLET, FILM COATED ORAL at 21:09

## 2020-02-22 RX ADMIN — FUROSEMIDE 20 MG: 20 TABLET ORAL at 09:00

## 2020-02-22 RX ADMIN — DULOXETINE HYDROCHLORIDE 60 MG: 60 CAPSULE, DELAYED RELEASE ORAL at 09:00

## 2020-02-22 RX ADMIN — SPIRONOLACTONE 50 MG: 25 TABLET ORAL at 09:00

## 2020-02-22 RX ADMIN — FOLIC ACID 1 MG: 1 TABLET ORAL at 09:00

## 2020-02-22 RX ADMIN — LACTULOSE 10 G: 10 SOLUTION ORAL at 21:10

## 2020-02-22 RX ADMIN — FLUTICASONE FUROATE AND VILANTEROL TRIFENATATE 1 PUFF: 200; 25 POWDER RESPIRATORY (INHALATION) at 09:13

## 2020-02-22 RX ADMIN — THIAMINE HCL TAB 100 MG 100 MG: 100 TAB at 09:00

## 2020-02-22 RX ADMIN — LACTULOSE 10 G: 10 SOLUTION ORAL at 09:07

## 2020-02-22 RX ADMIN — DICYCLOMINE HYDROCHLORIDE 20 MG: 20 TABLET ORAL at 07:05

## 2020-02-22 RX ADMIN — TRAZODONE HYDROCHLORIDE 200 MG: 50 TABLET ORAL at 21:09

## 2020-02-22 RX ADMIN — MULTIPLE VITAMINS W/ MINERALS TAB 1 TABLET: TAB at 09:00

## 2020-02-22 RX ADMIN — PANTOPRAZOLE SODIUM 40 MG: 40 TABLET, DELAYED RELEASE ORAL at 07:05

## 2020-02-22 RX ADMIN — DICYCLOMINE HYDROCHLORIDE 20 MG: 20 TABLET ORAL at 17:15

## 2020-02-22 RX ADMIN — DICYCLOMINE HYDROCHLORIDE 20 MG: 20 TABLET ORAL at 13:14

## 2020-02-22 ASSESSMENT — ACTIVITIES OF DAILY LIVING (ADL)
ADLS_ACUITY_SCORE: 19
ADLS_ACUITY_SCORE: 18
ADLS_ACUITY_SCORE: 19
ADLS_ACUITY_SCORE: 19

## 2020-02-22 NOTE — PLAN OF CARE
DATE & TIME: 2/22/2020 5014-8207       Cognitive Concerns/ Orientation : A&Ox4  BEHAVIOR & AGGRESSION TOOL COLOR: Green  CIWA SCORE: n/a  ABNL VS/O2: Bradycardic, otherwise VSS on RA; CPAP overnight   MOBILITY: Independent  PAIN MANAGMENT: denies   DIET: 2 gram sodium  BOWEL/BLADDER: Continent, up to bathroom   ABNL LAB/BG: n/a   DRAIN/DEVICES: PIV, saline locked   TELEMETRY RHYTHM: n/a  SKIN: Bruises and scabs.   TESTS/PROCEDURES: n/a  D/C DAY/GOALS/PLACE: Pending CD placement  OTHER INFORMATION: CD reassessed pt yesterday, psych reconsulted. SW following for placement.

## 2020-02-22 NOTE — PLAN OF CARE
Occupational Therapy Discharge Summary    Reason for therapy discharge:    All goals and outcomes met, no further needs identified.    Progress towards therapy goal(s). See goals on Care Plan in [facility] electronic health record for goal details.  Goals met    Therapy recommendation(s):    No further therapy is recommended.

## 2020-02-22 NOTE — PLAN OF CARE
DATE & TIME: 2/21 3-11pm       Cognitive Concerns/ Orientation : A&Ox4 anxious to be able to discharge  BEHAVIOR & AGGRESSION TOOL COLOR: Green  CIWA SCORE: n/a  ABNL VS/O2: VSS on RA except bradycardic at times, Uses CPAP at night.  MOBILITY: Independent, walks frequently  PAIN MANAGMENT: Reported back pain, PRN Tylenol once.  DIET: 2 gram sodium, fair appetite  BOWEL/BLADDER: Continent, using BR. 2 BMs today   ABNL LAB/BG: n/a (no labs since 17th)  DRAIN/DEVICES: PIV SL  TELEMETRY RHYTHM: n/a  SKIN: Bruises and scabs.   TESTS/PROCEDURES: n/a  D/C DAY/GOALS/PLACE: TBD- patient hoping psych and CD will allow him to discharge home instead of inpatient tx. medically stable, SW following.   OTHER INFORMATION: New psych and CD consults

## 2020-02-22 NOTE — PROGRESS NOTES
[facility]    Medicine Progress Note - Hospitalist Service        Date of Admission:  2/3/2020  1:57 AM    Assessment & Plan:   Jim Richard is a 64 year old male who was admitted on 2/3/2020. His history is significant for cirrhosis (w/ ascites, esophageal varices), EtOH use disorder, depression/anxiety, COPD, sleep apnea, anemia, admitted for EtOH intoxication.  He was recently hospitalized at [facility] for weakness in EtOH withdrawal, and was seen by GI, CD, and Psych. It appears the initial plan was for him to go to TCU w/ close GI f/u to adjust his diuretics, but he refused TCU and went home. Since going home he has been drinking, medication compliance sporadic, with worsening abdominal ascites. He came in for SI in the setting of drinking.      Alcohol Withdrawal  -Multiple admissions and emergency room visits related to alcohol use problems  -CD initially recommended discharge to TCU first, however subsequently plan was to discharge him to [facility] chemical dependency unit.  Awaiting approval there. Even if the patient is accepted, it would be the first week of March before they can offer a bed to the patient.  -Await psychiatry re-consult today.  Difficult disposition.  Patient has had multiple emergency room visits and admission related to alcohol use.  -continue daily thiamine, folate, MVI    Alcoholic Cirrhosis with Esophageal Varices  -Compensated  -Evaluated by gastroenterology  -Continue Spironolactone 50 mg PO daily, Lasix 20 mg PO daily, Propranolol, Pantoprazole, Lactulose 10 g PO BID, titrate to 2-3 BMs per day     Anemia with hx of Esophageal Varices  -Hemoglobin stable at this time, no plans for any invasive work-up.     Depression with Suicidal Ideation   Reported SI prior to hospitalization although no further SI noted after arrival  -Appreciate psychiatry assistance  -Continue duloxetine, Mirtazapine, Trazodone, prn Seroquel    Diet: Combination Diet 2 gm NA  "Diet  Snacks/Supplements Adult: Boost Plus; Between Meals     DVT Prophylaxis: Pneumatic Compression Devices   Leger Catheter: not present  Code Status: DNR/DNI     Disposition Plan    Expected discharge:Awaiting placement  Entered: Luis Bateman MD 02/22/2020, 2:05 PM        The patient's care was discussed with the Bedside Nurse and Patient.    Luis Bateman MD  Hospitalist Service  [facility]    ______________________________________________________________________    Interval History   Denies new complaints; asked me when he could go home. No new acute nursing concerns    Data reviewed today: I reviewed all medications, new labs and imaging results over the last 24 hours. I personally reviewed no images or EKG's today.    Physical Exam   Vital signs:  Temp: 98.5  F (36.9  C) Temp src: Oral BP: 129/75 Pulse: 56 Heart Rate: (!) 49 Resp: 16 SpO2: 98 % O2 Device: None (Room air) Oxygen Delivery: 2 LPM Height: 170.2 cm (5' 7\") Weight: 89.6 kg (197 lb 8.5 oz)  Estimated body mass index is 30.94 kg/m  as calculated from the following:    Height as of this encounter: 1.702 m (5' 7\").    Weight as of this encounter: 89.6 kg (197 lb 8.5 oz).      Wt Readings from Last 2 Encounters:   02/03/20 89.6 kg (197 lb 8.5 oz)   01/16/20 83.5 kg (184 lb 1.4 oz)       Gen: AAOX?1-2, NAD  Resp: CTA B/L, normal WOB  CVS: RRR, no murmur  Abd/GI: Soft, non-tender. BS- normoactive.    Neuro- CN- intact. No focal deficits.       Data   Recent Labs   Lab 02/17/20  0904   *   POTASSIUM 4.2   CHLORIDE 102   CO2 23   BUN 23   CR 1.38*   ANIONGAP 7   KEV 9.0   *       No results found for this or any previous visit (from the past 24 hour(s)).  Medications       dicyclomine  20 mg Oral 4x Daily AC & HS     DULoxetine  60 mg Oral Daily     fluticasone-vilanterol  1 puff Inhalation Daily     folic acid  1 mg Oral Daily     furosemide  20 mg Oral Daily     lactulose  10 g Oral BID     mirtazapine  15 mg Oral At " Bedtime     multivitamin w/minerals  1 tablet Oral Daily     nicotine  1 patch Transdermal Daily     nicotine   Transdermal Q8H     pantoprazole  40 mg Oral QAM AC     propranolol  10 mg Oral BID     spironolactone  50 mg Oral Daily     traZODone  200 mg Oral At Bedtime     vitamin B1  100 mg Oral Daily

## 2020-02-22 NOTE — CONSULTS
Consult Date:  02/22/2020      FOLLOWUP PSYCHIATRY CONSULTATION      REASON FOR CONSULTATION:  Alcohol use disorder, unable to place in inpatient chemical dependency treatment.      HISTORY OF PRESENT ILLNESS:  The patient was seen by numerous psychiatric providers.  He was seen by Dr. Davis on 02/03, Dr. Jacobs on 02/04 and Dr. Kearney on 02/09.  A followup Psychiatry consult has been done.  The patient has had 16 minutes Emergency Room visits.  He has numerous complications from his drinking.  He has alcoholic cirrhosis with esophageal varices, depression and anxiety.  He was hospitalized numerous times and he has not been able to stay sober on the outside.      The patient says that he is sleeping okay.  His appetite is okay.  His energy and motivation are okay.  He does not have any suicidal ideation, does not have auditory hallucinations.  He has been drinking since age 15.  It has been a problem for 15 years.  He has tolerance, withdrawal, progressive use with loss of control, tried to quit unsuccessfully, despite negative consequences --  impacting his health, strained and family relationships.  He also is having a lot of memory problems.  He has short-term memory problems.  He is not able to recall things.  He says he is able to do his activities of daily living, but is not very clear because records indicate that the house was in disarray and patient may be minimizing it.  He is able to tell me his medical problems, his discharge medications.  He said today is 04/22/2020 but he was able to correct himself.  He is not able to name the past Presidents.  He is not able to tell me serial 7s beyond 93.  His immediate recall is as good, but short-term recall is poor.  He could not do the intersecting pentagons.  He was actually seen by Occupational Therapy who also thought that he could have major neurocognitive disorder.      Please review the past psychiatric history, chemical dependency history, family  and social history as per Dr. Davis's note on 02/03/2020.      VITAL SIGNS:  Temperature of 129/98, pulse of 56, heart rate of 49.        Please review the physical examination and review of systems done on this patient by Dr. Luis Bateman on 02/21/2020.      MENTAL STATUS EXAMINATION:  The patient is a 64-year-old  male who is disheveled.  He is sitting on  the edge of the bed.  He maintains good eye contact, is superficially cooperative.  Mood is euthymic.  Affect is congruent.  Speech is spontaneous, normal rate.  Does not have any active suicidal ideation, does not have auditory hallucinations.  Insight and judgment are limited.  He is oriented to place and person.  Attention and concentration are less than adequate.  Recent and remote memory is less than adequate.  Language and  fund of knowledge are less than adequate.  Muscle strength normal, gait normal.      DIAGNOSES:   Axis I:  Alcohol use disorder, severe; alcoholic cirrhosis; esophageal varices; depression.      RECOMMENDATIONS:   1.  Medical stabilization as per Internal Medicine.   2.  The patient has numerous admissions to the emergency room.  He is not able to quit drinking.  His primary treatment team is thinking of a civil commitment.  If patient is not willing to do treatment, this would be an Ideal option for him;    OT testing says patient could have memory problems; her final results are not back yet.  If the patient has memory problems, it would be a good idea to place patient in a treatment facility that has more supervision, eg [facility] and then do supervised setting .  The patient is having problems with memory.  The patient is not able to take care of his health.     work on placement because patient is not safe to go home on his own.  He has s a high relapse rate and probably has memory problems.  These recommendations were given to the primary treatment team.         OBEY SANDOVAL MD             D:  2020   T: 2020   MT: RAMEZ      Name:     DEVONTE SEGURA   MRN:      [id number]        Account:       [id number]   :      1955           Consult Date:  2020      Document: [id number]       cc: Talon Elias MD

## 2020-02-23 PROCEDURE — 25000132 ZZH RX MED GY IP 250 OP 250 PS 637: Mod: GY | Performed by: INTERNAL MEDICINE

## 2020-02-23 PROCEDURE — 12000000 ZZH R&B MED SURG/OB

## 2020-02-23 PROCEDURE — 99232 SBSQ HOSP IP/OBS MODERATE 35: CPT | Performed by: PSYCHIATRY & NEUROLOGY

## 2020-02-23 PROCEDURE — 25000132 ZZH RX MED GY IP 250 OP 250 PS 637: Mod: GY | Performed by: HOSPITALIST

## 2020-02-23 PROCEDURE — 25000132 ZZH RX MED GY IP 250 OP 250 PS 637: Mod: GY | Performed by: PHYSICIAN ASSISTANT

## 2020-02-23 PROCEDURE — 99207 ZZC CDG-MDM COMPONENT: MEETS MODERATE - UP CODED: CPT | Performed by: INTERNAL MEDICINE

## 2020-02-23 PROCEDURE — 99232 SBSQ HOSP IP/OBS MODERATE 35: CPT | Performed by: INTERNAL MEDICINE

## 2020-02-23 RX ADMIN — TRAZODONE HYDROCHLORIDE 200 MG: 50 TABLET ORAL at 21:38

## 2020-02-23 RX ADMIN — DICYCLOMINE HYDROCHLORIDE 20 MG: 20 TABLET ORAL at 16:31

## 2020-02-23 RX ADMIN — DICYCLOMINE HYDROCHLORIDE 20 MG: 20 TABLET ORAL at 13:01

## 2020-02-23 RX ADMIN — LACTULOSE 10 G: 10 SOLUTION ORAL at 21:38

## 2020-02-23 RX ADMIN — LACTULOSE 10 G: 10 SOLUTION ORAL at 08:39

## 2020-02-23 RX ADMIN — MULTIPLE VITAMINS W/ MINERALS TAB 1 TABLET: TAB at 08:38

## 2020-02-23 RX ADMIN — SPIRONOLACTONE 50 MG: 25 TABLET ORAL at 08:38

## 2020-02-23 RX ADMIN — PANTOPRAZOLE SODIUM 40 MG: 40 TABLET, DELAYED RELEASE ORAL at 08:38

## 2020-02-23 RX ADMIN — DICYCLOMINE HYDROCHLORIDE 20 MG: 20 TABLET ORAL at 08:38

## 2020-02-23 RX ADMIN — DULOXETINE HYDROCHLORIDE 60 MG: 60 CAPSULE, DELAYED RELEASE ORAL at 08:38

## 2020-02-23 RX ADMIN — DICYCLOMINE HYDROCHLORIDE 20 MG: 20 TABLET ORAL at 21:38

## 2020-02-23 RX ADMIN — FOLIC ACID 1 MG: 1 TABLET ORAL at 08:38

## 2020-02-23 RX ADMIN — NICOTINE 1 PATCH: 14 PATCH, EXTENDED RELEASE TRANSDERMAL at 08:43

## 2020-02-23 RX ADMIN — MIRTAZAPINE 15 MG: 15 TABLET, FILM COATED ORAL at 21:38

## 2020-02-23 RX ADMIN — FUROSEMIDE 20 MG: 20 TABLET ORAL at 08:38

## 2020-02-23 RX ADMIN — THIAMINE HCL TAB 100 MG 100 MG: 100 TAB at 08:38

## 2020-02-23 ASSESSMENT — ACTIVITIES OF DAILY LIVING (ADL)
ADLS_ACUITY_SCORE: 16
ADLS_ACUITY_SCORE: 17
ADLS_ACUITY_SCORE: 19
ADLS_ACUITY_SCORE: 17
ADLS_ACUITY_SCORE: 16
ADLS_ACUITY_SCORE: 17

## 2020-02-23 NOTE — CONSULTS
Consult Date:  02/23/2020      REASON FOR CONSULTATION:  Follow up consult by Dr. Batemna.      HISTORY OF PRESENT ILLNESS:  The patient was seen yesterday by me and a consult was requested again.  The patient was seen by numerous providers.  Today, he says he is sleeping okay, his appetite is okay, and his motivation is good.  Yesterday, he did not want to do any treatment, but right now he is willing to do treatment.  He understands alcoholism is a problem for him.  He did have occupational therapy done.  The note from Nic Hackett is not done yet, but basically, the patient does have a lot of memory problems.  He was seen with the presence of a  who says the patient will benefit from just going to treatment at [facility].        Please review the past psychiatric history, chemical dependency history, social history by Dr. Davis's note on 02/03/2020.      VITAL SIGNS:  Blood pressure 118/63, temperature 97.5, pulse of 49.        Please see detailed physical examination and 10-point review of systems done on this patient by Dr. Luis Bateman.  The patient has alcoholic cirrhosis, esophageal varices, anemia.      MENTAL STATUS EXAMINATION:  The patient is a 64-year-old  male who is disheveled, sitting and eating food.  He has adequate grooming.  He has good eye contact, cooperative.  Mood is euthymic.  Affect is congruent.  Speech is spontaneous, normal rate.  Does not have any suicidal ideation.  Does not have auditory or visual hallucinations.  Speech is normal rate and volume.  Linear thought process.  No loose associations.  Insight and judgment are limited.  Oriented to place and person.  Attention and concentration are less than adequate.  Recent memory less than adequate.  Language, fund of knowledge are less than adequate.  Normal strength and normal gait.      DIAGNOSES:   Axis I:     1.  Alcohol use disorder, severe.   2.  Alcoholic cirrhosis.   3.  Esophageal varices.   4.   Depression.      RECOMMENDATIONS:   1.  Medical stabilization.   2.  The patient has numerous medical admissions.  He is not able to quit drinking.  His primary treatment was to pursue  commitment.  The patient is now willing to do treatment.  He has some memory problems.  I met with the  and the plan is for him to go to a treatment program in [facility].  This recommendation was given to hs primary trt team .         OBEY SANDOVAL MD             D: 2020   T: 2020   MT: PEREZ      Name:     DEVONTE SEGURA   MRN:      [id number]        Account:       [id number]   :      1955           Consult Date:  2020      Document: [id number]

## 2020-02-23 NOTE — PROGRESS NOTES
[facility]    Medicine Progress Note - Hospitalist Service        Date of Admission:  2/3/2020  1:57 AM    Assessment & Plan:   Jim Richard is a 64 year old male who was admitted on 2/3/2020. His history is significant for cirrhosis (w/ ascites, esophageal varices), EtOH use disorder, depression/anxiety, COPD, sleep apnea, anemia, admitted for EtOH intoxication.  He was recently hospitalized at [facility] for weakness in EtOH withdrawal, and was seen by GI, CD, and Psych. It appears the initial plan was for him to go to TCU w/ close GI f/u to adjust his diuretics, but he refused TCU and went home. Since going home he has been drinking, medication compliance sporadic, with worsening abdominal ascites. He came in for SI in the setting of drinking.      Alcohol Withdrawal  -Multiple admissions and emergency room visits related to alcohol use problems  -CD initially recommended discharge to TCU first, however subsequently plan was to discharge him to [facility] chemical dependency unit.  Awaiting approval there. Even if the patient is accepted, it would be the first week of March before they can offer a bed to the patient.  -Psychiatry reconsulted today, patient is agreeable to treatment at [facility]  - to assist with placement  -continue daily thiamine, folate, MVI    Alcoholic Cirrhosis with Esophageal Varices  -Compensated  -Evaluated by gastroenterology  -Continue Spironolactone 50 mg PO daily, Lasix 20 mg PO daily, Propranolol, Pantoprazole, Lactulose 10 g PO BID, titrate to 2-3 BMs per day     Anemia with hx of Esophageal Varices  -Hemoglobin stable at this time, no plans for any invasive work-up.     Depression with Suicidal Ideation   Reported SI prior to hospitalization although no further SI noted after arrival  -Appreciate psychiatry assistance  -Continue duloxetine, Mirtazapine, Trazodone, prn Seroquel    Cognitive impairment  -Cognistat was performed yesterday and patient  "scored in the moderate impairment range, results therefore suggestive of dementia syndrome.  -Discussed with psychiatry yesterday who felt that it would not be safe to discharge patient home, as discussed above likely will discharge to [facility]    Diet: Combination Diet 2 gm NA Diet  Snacks/Supplements Adult: Boost Plus; Between Meals     DVT Prophylaxis: Pneumatic Compression Devices   Leger Catheter: not present  Code Status: DNR/DNI     Disposition Plan    Expected discharge:Awaiting placement  Entered: Luis Bateman MD 02/23/2020, 12:44 PM        The patient's care was discussed with the Bedside Nurse and Patient.    Luis Bateman MD  Hospitalist Service  [facility]    ______________________________________________________________________    Interval History   Denies new complaints.  He is eager to know his discharge plans.  Denies nausea or vomiting.    Data reviewed today: I reviewed all medications, new labs and imaging results over the last 24 hours. I personally reviewed no images or EKG's today.    Physical Exam   Vital signs:  Temp: 97.5  F (36.4  C) Temp src: Oral BP: 118/63 Pulse: (!) 49 Heart Rate: (!) 47 Resp: 18 SpO2: 98 % O2 Device: None (Room air) Oxygen Delivery: 2 LPM Height: 170.2 cm (5' 7\") Weight: 89.6 kg (197 lb 8.5 oz)  Estimated body mass index is 30.94 kg/m  as calculated from the following:    Height as of this encounter: 1.702 m (5' 7\").    Weight as of this encounter: 89.6 kg (197 lb 8.5 oz).      Wt Readings from Last 2 Encounters:   02/03/20 89.6 kg (197 lb 8.5 oz)   01/16/20 83.5 kg (184 lb 1.4 oz)       Gen: AAOX2, NAD  Resp: CTA B/L, normal WOB  CVS: RRR, no murmur  Abd/GI: Soft, non-tender. BS- normoactive.    Neuro- CN- intact. No focal deficits.       Data   Recent Labs   Lab 02/17/20  0904   *   POTASSIUM 4.2   CHLORIDE 102   CO2 23   BUN 23   CR 1.38*   ANIONGAP 7   KEV 9.0   *       No results found for this or any previous visit (from the " past 24 hour(s)).  Medications       dicyclomine  20 mg Oral 4x Daily AC & HS     DULoxetine  60 mg Oral Daily     fluticasone-vilanterol  1 puff Inhalation Daily     folic acid  1 mg Oral Daily     furosemide  20 mg Oral Daily     lactulose  10 g Oral BID     mirtazapine  15 mg Oral At Bedtime     multivitamin w/minerals  1 tablet Oral Daily     nicotine  1 patch Transdermal Daily     nicotine   Transdermal Q8H     pantoprazole  40 mg Oral QAM AC     propranolol  10 mg Oral BID     spironolactone  50 mg Oral Daily     traZODone  200 mg Oral At Bedtime     vitamin B1  100 mg Oral Daily

## 2020-02-23 NOTE — PLAN OF CARE
DATE & TIME: 2/22/2020 DAY                    Cognitive Concerns/ Orientation : A&Ox2, disoriented to time and place   BEHAVIOR & AGGRESSION TOOL COLOR: Yellow, confused at times  CIWA SCORE: NA    ABNL VS/O2: VSS on RA except bradycardia  MOBILITY: Independent  PAIN MANAGMENT: Denies  DIET: 2g sodium  BOWEL/BLADDER: Continent  ABNL LAB/BG: see epic  DRAIN/DEVICES: PIV  TELEMETRY RHYTHM: NA  SKIN: Bruising and scabs  TESTS/PROCEDURES: NA  D/C DAY/GOALS/PLACE: Pending placement  OTHER IMPORTANT INFO: Nicotine patch to L deltoid. Psych/CD following.

## 2020-02-23 NOTE — PLAN OF CARE
Shift 4338-0946: VSS ex bradycardic. A/O x 4. Lungs: clear/dim throughout, RA. BS present, passing flatus. Void: WDL. Activity: Up ind, ambulates muniz. Diet: 2 Gram Na+. Denies pain.

## 2020-02-23 NOTE — PLAN OF CARE
DATE & TIME: 2/22 from 1900 to 0730    Cognitive Concerns/ Orientation : A&O x 2, disoriented ot time and place.   BEHAVIOR & AGGRESSION TOOL COLOR: Green, confused at times  CIWA SCORE: N/A   ABNL VS/O2: VSS on RA except bradycardic  MOBILITY: Up and independent in the room  PAIN MANAGMENT: Denied  DIET: 2g Na diet  BOWEL/BLADDER: Continent, voiding well. No BM during this shift  ABNL LAB/BG: Na 132, Creatinine 1.38, , Hgb 8.8, Platelet 67  DRAIN/DEVICES: PIV saline lock  TELEMETRY RHYTHM: N/A  SKIN: Scattered bruising, trace edema to BLE,   TESTS/PROCEDURES: N/A  D/C DAY/GOALS/PLACE: Discharge pending in progress for placement  OTHER IMPORTANT INFO: Nicotine patch on left deltoid, Pshch/CD following

## 2020-02-24 ENCOUNTER — APPOINTMENT (OUTPATIENT)
Dept: ULTRASOUND IMAGING | Facility: CLINIC | Age: 65
DRG: 433 | End: 2020-02-24
Attending: INTERNAL MEDICINE
Payer: MEDICARE

## 2020-02-24 LAB — INR PPP: 1.24 (ref 0.86–1.14)

## 2020-02-24 PROCEDURE — 85610 PROTHROMBIN TIME: CPT | Performed by: PHYSICIAN ASSISTANT

## 2020-02-24 PROCEDURE — 12000000 ZZH R&B MED SURG/OB

## 2020-02-24 PROCEDURE — 25000132 ZZH RX MED GY IP 250 OP 250 PS 637: Mod: GY | Performed by: INTERNAL MEDICINE

## 2020-02-24 PROCEDURE — 25000132 ZZH RX MED GY IP 250 OP 250 PS 637: Mod: GY | Performed by: PHYSICIAN ASSISTANT

## 2020-02-24 PROCEDURE — 25000132 ZZH RX MED GY IP 250 OP 250 PS 637: Mod: GY | Performed by: HOSPITALIST

## 2020-02-24 PROCEDURE — 36415 COLL VENOUS BLD VENIPUNCTURE: CPT | Performed by: PHYSICIAN ASSISTANT

## 2020-02-24 PROCEDURE — 76705 ECHO EXAM OF ABDOMEN: CPT

## 2020-02-24 PROCEDURE — 99207 ZZC CDG-MDM COMPONENT: MEETS MODERATE - UP CODED: CPT | Performed by: INTERNAL MEDICINE

## 2020-02-24 PROCEDURE — 99232 SBSQ HOSP IP/OBS MODERATE 35: CPT | Performed by: INTERNAL MEDICINE

## 2020-02-24 RX ORDER — NICOTINE POLACRILEX 4 MG
15-30 LOZENGE BUCCAL
Status: DISCONTINUED | OUTPATIENT
Start: 2020-02-24 | End: 2020-02-24 | Stop reason: CLARIF

## 2020-02-24 RX ORDER — DEXTROSE MONOHYDRATE 25 G/50ML
25-50 INJECTION, SOLUTION INTRAVENOUS
Status: DISCONTINUED | OUTPATIENT
Start: 2020-02-24 | End: 2020-02-24 | Stop reason: CLARIF

## 2020-02-24 RX ORDER — ALBUMIN (HUMAN) 12.5 G/50ML
12.5 SOLUTION INTRAVENOUS ONCE
Status: DISCONTINUED | OUTPATIENT
Start: 2020-02-24 | End: 2020-02-24 | Stop reason: CLARIF

## 2020-02-24 RX ADMIN — MULTIPLE VITAMINS W/ MINERALS TAB 1 TABLET: TAB at 08:59

## 2020-02-24 RX ADMIN — LACTULOSE 10 G: 10 SOLUTION ORAL at 20:16

## 2020-02-24 RX ADMIN — PANTOPRAZOLE SODIUM 40 MG: 40 TABLET, DELAYED RELEASE ORAL at 07:47

## 2020-02-24 RX ADMIN — THIAMINE HCL TAB 100 MG 100 MG: 100 TAB at 08:59

## 2020-02-24 RX ADMIN — DICYCLOMINE HYDROCHLORIDE 20 MG: 20 TABLET ORAL at 07:47

## 2020-02-24 RX ADMIN — DICYCLOMINE HYDROCHLORIDE 20 MG: 20 TABLET ORAL at 15:56

## 2020-02-24 RX ADMIN — SPIRONOLACTONE 50 MG: 25 TABLET ORAL at 08:59

## 2020-02-24 RX ADMIN — LACTULOSE 10 G: 10 SOLUTION ORAL at 08:58

## 2020-02-24 RX ADMIN — FUROSEMIDE 20 MG: 20 TABLET ORAL at 08:59

## 2020-02-24 RX ADMIN — DULOXETINE HYDROCHLORIDE 60 MG: 60 CAPSULE, DELAYED RELEASE ORAL at 08:59

## 2020-02-24 RX ADMIN — FLUTICASONE FUROATE AND VILANTEROL TRIFENATATE 1 PUFF: 200; 25 POWDER RESPIRATORY (INHALATION) at 09:00

## 2020-02-24 RX ADMIN — DICYCLOMINE HYDROCHLORIDE 20 MG: 20 TABLET ORAL at 11:06

## 2020-02-24 RX ADMIN — NICOTINE 1 PATCH: 14 PATCH, EXTENDED RELEASE TRANSDERMAL at 09:00

## 2020-02-24 RX ADMIN — TRAZODONE HYDROCHLORIDE 200 MG: 50 TABLET ORAL at 21:36

## 2020-02-24 RX ADMIN — DICYCLOMINE HYDROCHLORIDE 20 MG: 20 TABLET ORAL at 21:36

## 2020-02-24 RX ADMIN — FOLIC ACID 1 MG: 1 TABLET ORAL at 09:00

## 2020-02-24 RX ADMIN — MIRTAZAPINE 15 MG: 15 TABLET, FILM COATED ORAL at 21:36

## 2020-02-24 ASSESSMENT — ACTIVITIES OF DAILY LIVING (ADL)
ADLS_ACUITY_SCORE: 16

## 2020-02-24 NOTE — PLAN OF CARE
DATE & TIME: 2/24/2020 7 am to 7pm     Cognitive Concerns/ Orientation : A&Ox4, forgetful.   BEHAVIOR & AGGRESSION TOOL COLOR: Green  CIWA SCORE: NA    ABNL VS/O2: VS ex on RA bradycardic  MOBILITY: Independent  PAIN MANAGMENT: Denies  DIET: 2g sodium, good appetite  BOWEL/BLADDER: Continent  ABNL LAB/BG: US abdomen done today  DRAIN/DEVICES: PIV SL  TELEMETRY RHYTHM: NA  SKIN: Bruising and scabs  TESTS/PROCEDURES: NA   D/C DAY/GOALS/PLACE: Pending - placement  OTHER IMPORTANT INFO: Psych/CD following. Declined paracentesis today.

## 2020-02-24 NOTE — PLAN OF CARE
DATE & TIME: 2/23/20 4373-2368         Cognitive Concerns/ Orientation : A&Ox4, forgetful.   BEHAVIOR & AGGRESSION TOOL COLOR: Green  CIWA SCORE: NA    ABNL VS/O2: VSS on RA except bradycardia  MOBILITY: Independent  PAIN MANAGMENT: Denies  DIET: 2g sodium, good appetite  BOWEL/BLADDER: Continent  ABNL LAB/BG: N/A - labs last drawn on 2/17  DRAIN/DEVICES: PIV SL  TELEMETRY RHYTHM: NA  SKIN: Bruising and scabs  TESTS/PROCEDURES: NA   D/C DAY/GOALS/PLACE: Pending - pt to treatment at [facility].   OTHER IMPORTANT INFO: Psych/CD following.

## 2020-02-24 NOTE — PROGRESS NOTES
[facility]    Medicine Progress Note - Hospitalist Service        Date of Admission:  2/3/2020  1:57 AM    Assessment & Plan:   Jim Richard is a 64 year old male who was admitted on 2/3/2020. His history is significant for cirrhosis (w/ ascites, esophageal varices), EtOH use disorder, depression/anxiety, COPD, sleep apnea, anemia, admitted for EtOH intoxication.  He was recently hospitalized at [facility] for weakness in EtOH withdrawal, and was seen by GI, CD, and Psych. It appears the initial plan was for him to go to TCU w/ close GI f/u to adjust his diuretics, but he refused TCU and went home. Since going home he has been drinking, medication compliance sporadic, with worsening abdominal ascites. He came in for SI in the setting of drinking.      Alcohol Withdrawal  -Multiple admissions and emergency room visits related to alcohol use problems  -CD initially recommended discharge to TCU first, however subsequently plan was to discharge him to [facility] chemical dependency unit.  Awaiting approval there. Even if the patient is accepted, it would be the first week of March before they can offer a bed to the patient.  -Psychiatry reconsulted on 2/23, patient is agreeable to treatment at [facility]  - to assist with placement  -continue daily thiamine, folate, MVI    Addendum: 3:45 PM  D/w SW; pt apparently refusing [facility]; will reconsult psych in AM, ?Needs commitment    Alcoholic Cirrhosis with Esophageal Varices  -Compensated  -Evaluated by gastroenterology  -Continue Spironolactone 50 mg PO daily, Lasix 20 mg PO daily, Propranolol, Pantoprazole, Lactulose 10 g PO BID, titrate to 2-3 BMs per day  -Patient concerned about worsening abdominal distention, will arrange ultrasound-guided paracentesis today.     Anemia with hx of Esophageal Varices  -Hemoglobin stable at this time, no plans for any invasive work-up.     Depression with Suicidal Ideation   Reported SI prior to  "hospitalization although no further SI noted after arrival  -Appreciate psychiatry assistance  -Continue duloxetine, Mirtazapine, Trazodone, prn Seroquel    Cognitive impairment  -Cognistat was performed yesterday and patient scored in the moderate impairment range, results therefore suggestive of dementia syndrome.  -Discussed with psychiatry yesterday who felt that it would not be safe to discharge patient home, as discussed above likely will discharge to [facility]    Diet: Combination Diet 2 gm NA Diet  Snacks/Supplements Adult: Boost Plus; Between Meals     DVT Prophylaxis: Pneumatic Compression Devices   Leger Catheter: not present  Code Status: DNR/DNI     Disposition Plan    Expected discharge:Awaiting placement  Entered: Luis Bateman MD 02/24/2020, 1:22 PM        The patient's care was discussed with the Bedside Nurse and Patient.    Luis Bateman MD  Hospitalist Service  [facility]    ______________________________________________________________________    Interval History   Patient concerned about worsening abdominal distention, is worried about worsening ascites.    Data reviewed today: I reviewed all medications, new labs and imaging results over the last 24 hours. I personally reviewed no images or EKG's today.    Physical Exam   Vital signs:  Temp: 98.1  F (36.7  C) Temp src: Oral BP: 117/68  Heart Rate: 53 Resp: 16 SpO2: 95 % O2 Device: None (Room air) Oxygen Delivery: 2 LPM Height: 170.2 cm (5' 7\") Weight: 89.6 kg (197 lb 8.5 oz)  Estimated body mass index is 30.94 kg/m  as calculated from the following:    Height as of this encounter: 1.702 m (5' 7\").    Weight as of this encounter: 89.6 kg (197 lb 8.5 oz).      Wt Readings from Last 2 Encounters:   02/03/20 89.6 kg (197 lb 8.5 oz)   01/16/20 83.5 kg (184 lb 1.4 oz)       Gen: AAOX2, NAD  Resp: CTA B/L, normal WOB  CVS: RRR, no murmur  Abd/GI: Soft, distended.  Neuro- CN- intact. No focal deficits.       Data   No lab " results found in last 7 days.    No results found for this or any previous visit (from the past 24 hour(s)).  Medications       dicyclomine  20 mg Oral 4x Daily AC & HS     DULoxetine  60 mg Oral Daily     fluticasone-vilanterol  1 puff Inhalation Daily     folic acid  1 mg Oral Daily     furosemide  20 mg Oral Daily     lactulose  10 g Oral BID     mirtazapine  15 mg Oral At Bedtime     multivitamin w/minerals  1 tablet Oral Daily     nicotine  1 patch Transdermal Daily     nicotine   Transdermal Q8H     pantoprazole  40 mg Oral QAM AC     propranolol  10 mg Oral BID     spironolactone  50 mg Oral Daily     traZODone  200 mg Oral At Bedtime     vitamin B1  100 mg Oral Daily

## 2020-02-24 NOTE — PROGRESS NOTES
CLINICAL NUTRITION SERVICES - REASSESSMENT NOTE    EVALUATION OF PROGRESS TOWARD GOALS   Diet: 2g Na  Boost @ 2 pm    Intake/Tolerance: 100% intakes documented, excellent appetite continues. Patient orders meals TID, all adequately sized. Occasionally orders a snack between meals.     NEW FINDINGS:   - Awaiting placement   - no weight since 2/3  - Labs reviewed (none since 2/17)  - Meds reviewed - no pertinent changes     Previous Goals:   Patient will continue to consume 100% of meals and supplements  Evaluation: Met    Previous Nutrition Diagnosis:   No nutrition diagnosis identified at this time   Evaluation: No change    CURRENT NUTRITION DIAGNOSIS  No nutrition diagnosis identified at this time     INTERVENTIONS  Recommendations / Nutrition Prescription  Continue 2g Na diet as tolerated  Boost once daily at 2 pm     Implementation  None new    Goals  Patient will continue to consume 100% of meals and supplements    MONITORING AND EVALUATION:  Progress towards goals will be monitored and evaluated per protocol and Practice Guidelines    Summer Isaac RD, LD  [facility], 65, 54, MH   Pager: [phone]  Weekend Pager: [phone]

## 2020-02-24 NOTE — PROGRESS NOTES
SW:  D:  Psychiatry input noted.  Because patient is voluntarily wanting to enter a treatment program, a petition for commitment is not indicated.    Cognistat results noted and have been faxed to Jim at [facility] intake.    Met with patient.  Reviewed awaiting to hear back from [facility].  If they decline he is willing to go to [facility] where he has been before. He is aware they have a 30 day program which costs $5,400. They also offer after care program with AA or sober housing.  Patient shared with writer that he was in sober housing in the past and was evicted as he drank on site.   Writer spoke with [facility].  Patient needs to call for a Pre-registration inteview if he wishes to attend the program and [facility] is requesting medical information from this stay to be faxed.     Writer also discussed [facility] which offers a slower pace with [facility] for those with memory loss or TBI.     [facility] is 19,500.  Patient doesn't want to go to [facility] due to the cost. He states he want to convserve his investments for his children.  Patient is not eligible for Rule 25 funding due to his income and assets.    Patient's brother called, Jame. Patient gave writer permission to call Jame.  Jame asked for an update and shared his concern also that patient goes to treatment and then goes home and relapses.  Jame asked writer to contact the [facility] [employer] liasion Nisakaylynn [place] at [phone].  Call placed to her and left a message for a return call.    A: Patient is at high risk for relapse after treatment as his history is that  doesn't follow thru with aftercare.  He would benefit from a more support housing.   The Cognistat score, per OT, is suggestive of dementia syndrome.    P:  Will follow with [facility] and [facility].  If declined by both, will speak with hospitalist.

## 2020-02-25 PROBLEM — F10.239 ALCOHOL DEPENDENCE WITH WITHDRAWAL WITH COMPLICATION (H): Status: ACTIVE | Noted: 2020-02-25

## 2020-02-25 PROBLEM — F10.10 ALCOHOL ABUSE: Status: RESOLVED | Noted: 2018-09-29 | Resolved: 2020-02-25

## 2020-02-25 PROCEDURE — 25000132 ZZH RX MED GY IP 250 OP 250 PS 637: Mod: GY | Performed by: INTERNAL MEDICINE

## 2020-02-25 PROCEDURE — 25000132 ZZH RX MED GY IP 250 OP 250 PS 637: Mod: GY | Performed by: PHYSICIAN ASSISTANT

## 2020-02-25 PROCEDURE — 25000132 ZZH RX MED GY IP 250 OP 250 PS 637: Mod: GY | Performed by: HOSPITALIST

## 2020-02-25 PROCEDURE — 12000000 ZZH R&B MED SURG/OB

## 2020-02-25 PROCEDURE — 99231 SBSQ HOSP IP/OBS SF/LOW 25: CPT | Performed by: PSYCHIATRY & NEUROLOGY

## 2020-02-25 PROCEDURE — 99232 SBSQ HOSP IP/OBS MODERATE 35: CPT | Performed by: INTERNAL MEDICINE

## 2020-02-25 RX ORDER — SPIRONOLACTONE 25 MG/1
50 TABLET ORAL
Status: DISCONTINUED | OUTPATIENT
Start: 2020-02-25 | End: 2020-02-26 | Stop reason: HOSPADM

## 2020-02-25 RX ADMIN — FLUTICASONE FUROATE AND VILANTEROL TRIFENATATE 1 PUFF: 200; 25 POWDER RESPIRATORY (INHALATION) at 08:13

## 2020-02-25 RX ADMIN — TRAZODONE HYDROCHLORIDE 200 MG: 50 TABLET ORAL at 22:02

## 2020-02-25 RX ADMIN — FOLIC ACID 1 MG: 1 TABLET ORAL at 08:05

## 2020-02-25 RX ADMIN — SPIRONOLACTONE 50 MG: 25 TABLET ORAL at 08:11

## 2020-02-25 RX ADMIN — SPIRONOLACTONE 50 MG: 25 TABLET ORAL at 22:02

## 2020-02-25 RX ADMIN — DICYCLOMINE HYDROCHLORIDE 20 MG: 20 TABLET ORAL at 11:47

## 2020-02-25 RX ADMIN — LACTULOSE 10 G: 10 SOLUTION ORAL at 22:03

## 2020-02-25 RX ADMIN — THIAMINE HCL TAB 100 MG 100 MG: 100 TAB at 08:15

## 2020-02-25 RX ADMIN — MIRTAZAPINE 15 MG: 15 TABLET, FILM COATED ORAL at 22:02

## 2020-02-25 RX ADMIN — DICYCLOMINE HYDROCHLORIDE 20 MG: 20 TABLET ORAL at 22:02

## 2020-02-25 RX ADMIN — DULOXETINE HYDROCHLORIDE 60 MG: 60 CAPSULE, DELAYED RELEASE ORAL at 08:06

## 2020-02-25 RX ADMIN — FUROSEMIDE 20 MG: 20 TABLET ORAL at 08:06

## 2020-02-25 RX ADMIN — NICOTINE 1 PATCH: 14 PATCH, EXTENDED RELEASE TRANSDERMAL at 08:12

## 2020-02-25 RX ADMIN — LACTULOSE 10 G: 10 SOLUTION ORAL at 08:09

## 2020-02-25 RX ADMIN — PANTOPRAZOLE SODIUM 40 MG: 40 TABLET, DELAYED RELEASE ORAL at 08:06

## 2020-02-25 RX ADMIN — MULTIPLE VITAMINS W/ MINERALS TAB 1 TABLET: TAB at 08:05

## 2020-02-25 RX ADMIN — DICYCLOMINE HYDROCHLORIDE 20 MG: 20 TABLET ORAL at 15:47

## 2020-02-25 RX ADMIN — DICYCLOMINE HYDROCHLORIDE 20 MG: 20 TABLET ORAL at 08:07

## 2020-02-25 ASSESSMENT — ACTIVITIES OF DAILY LIVING (ADL)
ADLS_ACUITY_SCORE: 16

## 2020-02-25 ASSESSMENT — MIFFLIN-ST. JEOR
SCORE: 1566.91
SCORE: 1639.63

## 2020-02-25 NOTE — PLAN OF CARE
[id number]  Pt is A&Ox4. VSS on RA ex mandi in 50s, scheduled meds held this shift. Denies pain. Skin lemuel, scabbing, bruising generalized. Up independently in room. Tolerating 2g NA diet well. Peyman patch on L arm in place. PIV SL. Abd US done today, declined paracentesis. Psych and chem dep following. Discharge pending psych bed placement.

## 2020-02-25 NOTE — PLAN OF CARE
DATE & TIME: 2/25/2020 7am to 7pm     Cognitive Concerns/ Orientation : A&Ox4, forgetful.   BEHAVIOR & AGGRESSION TOOL COLOR: Green  CIWA SCORE: NA    ABNL VS/O2: VS ex on RA bradycardic  MOBILITY: Independent  PAIN MANAGMENT: Denies  DIET: 3g sodium, good appetite, eats 100% of daily meals with snacks  BOWEL/BLADDER: Continent  ABNL LAB/BG: none today  DRAIN/DEVICES: PIV SL  TELEMETRY RHYTHM: NA  SKIN: Bruising and scabs  TESTS/PROCEDURES: NA   D/C DAY/GOALS/PLACE: Pending - placement on a treatment facility  OTHER IMPORTANT INFO: Psyche visited today pls. see notes

## 2020-02-25 NOTE — CONSULTS
Psychiatry consultation:  Patient seen, full note to follow.    Alcohol use disorder, severe  Unspecified depressive disorder  Hepatic cirrhosis with ascites    Awaiting bed availability at a hospital-based residential chemical dependency treatment facility.  Continue Cymbalta for mood management.  Mood appears stable without suicidal ideation.    Please reconsult with psychiatry as needed.  Jeff Brito MD   Text Page

## 2020-02-25 NOTE — PLAN OF CARE
DATE & TIME: 2/25/2020 9150-6000  Cognitive Concerns/ Orientation : A&Ox4  BEHAVIOR & AGGRESSION TOOL COLOR: Green  CIWA SCORE: n/a  ABNL VS/O2: Bradycardic (HR 50s), otherwise VSS on RA; CPAP overnight   MOBILITY: Independent  PAIN MANAGMENT: Denies pain   DIET: 2g sodium, tolerating   BOWEL/BLADDER: Continent, up to bathroom   ABNL LAB/BG: n/a  DRAIN/DEVICES: PIV, saline locked   TELEMETRY RHYTHM: n/a  SKIN: Bruising and scabs  TESTS/PROCEDURES: n/a  D/C DAY/GOALS/PLACE: Pending CD placement   OTHER IMPORTANT INFO: Psych consult today

## 2020-02-25 NOTE — ACP (ADVANCE CARE PLANNING)
SW:  Yesterday afternoon patient did call [facility] to complete their pre-registration interview.  Later patient shared with writer that [facility] recommended [facility].  Writer spoke with [facility] today and spoke with intake staff at [phone].  Intake staff explain their recommendation is for a hospital based program.  [facility] declined last week due to patient's SLUM score.  [facility] is assessing patient.  Based on patient's Cognistat cognitive findings, their medical director is asking their OT staff their opinion regarding patient's ability to participate and benefit in their program.    A:  Patient has been to  [facility] before and he consistently has requested to re-admit to the program.  This is the only program still assessing him that can bill Medicare for the treatment program.    Plan:  Awaiting to hear back from [facility]

## 2020-02-25 NOTE — PROGRESS NOTES
[facility]    Medicine Progress Note - Hospitalist Service        Date of Admission:  2/3/2020  1:57 AM    Assessment & Plan:   Jim Richard is a 64 year old male who was admitted on 2/3/2020. His history is significant for cirrhosis (w/ ascites, esophageal varices), EtOH use disorder, depression/anxiety, COPD, sleep apnea, anemia, admitted for EtOH intoxication.  He was recently hospitalized at [facility] for weakness in EtOH withdrawal, and was seen by GI, CD, and Psych. It appears the initial plan was for him to go to TCU w/ close GI f/u to adjust his diuretics, but he refused TCU and went home. Since going home he has been drinking, medication compliance sporadic, with worsening abdominal ascites. He came in for SI in the setting of drinking.      Alcohol Withdrawal  -Multiple admissions and emergency room visits related to alcohol use problems  -CD initially recommended discharge to TCU first, however subsequently plan was to discharge him to [facility] chemical dependency unit.  Awaiting approval there. Even if the patient is accepted, it would be the first week of March before they can offer a bed to the patient.  -Psychiatry reconsulted on 2/23, patient is agreeable to treatment at [facility]  - to assist with placement -- waiting for response from [facility].     Alcoholic Cirrhosis with Esophageal Varices  -Compensated  -Evaluated by gastroenterology  -Continue Spironolactone 50 mg PO daily, Lasix 20 mg PO daily, Propranolol, Pantoprazole, Lactulose 10 g PO BID, titrate to 2-3 BMs per day  -Patient concerned about worsening abdominal distention, will arrange ultrasound-guided paracentesis today.     Anemia with hx of Esophageal Varices  -Hemoglobin stable at this time, no plans for any invasive work-up.     Depression with Suicidal Ideation   Reported SI prior to hospitalization although no further SI noted after arrival  -Appreciate psychiatry assistance  -Continue  "duloxetine, Mirtazapine, Trazodone, prn Seroquel    Cognitive impairment  -Cognistat was performed yesterday and patient scored in the moderate impairment range, results therefore suggestive of dementia syndrome.  -Discussed with psychiatry yesterday who felt that it would not be safe to discharge patient home    Diet: Snacks/Supplements Adult: Boost Plus; Between Meals  2 Gram Sodium Diet     DVT Prophylaxis: Pneumatic Compression Devices   Leger Catheter: not present  Code Status: DNR/DNI     Disposition Plan    Expected discharge:Awaiting placement     Gray Vincent MD  Pager: [phone]  Cell Phone:  [phone]     ______________________________________________________________________    Interval History   No new complaints, updated with US result showing moderate ascites, but weight id down 20 lbs since admitted.     Physical Exam   Vital signs:  Temp: 98.4  F (36.9  C) Temp src: Oral BP: 124/66  Heart Rate: 60 Resp: 14 SpO2: 95 % O2 Device: None (Room air) Oxygen Delivery: 2 LPM Height: 170.2 cm (5' 7\") Weight: 82.3 kg (181 lb 8 oz)  Estimated body mass index is 28.43 kg/m  as calculated from the following:    Height as of this encounter: 1.702 m (5' 7\").    Weight as of this encounter: 82.3 kg (181 lb 8 oz).      Wt Readings from Last 2 Encounters:   02/25/20 82.3 kg (181 lb 8 oz)   01/16/20 83.5 kg (184 lb 1.4 oz)       Gen: Alert, NAD  Resp: CTA B/L, normal WOB  CVS: RRR, no murmur  Abd/GI: Soft, distended, semi-firm, non-tender  Neuro- Ox2.5. No focal deficits.       Data   Recent Labs   Lab 02/24/20  1439   INR 1.24*       Recent Results (from the past 24 hour(s))   US Abdomen Limited    Narrative    US ABDOMEN LIMITED 2/24/2020 3:01 PM    CLINICAL HISTORY: Recurrent ascites  TECHNIQUE: Limited abdominal ultrasound.    COMPARISON: 2/3/2020    FINDINGS AND     Impression    IMPRESSION: There is a small to moderate volume of ascites. The  patient declined paracentesis at this time.    SUN WHITMAN" FAHRNER, MD     Medications       dicyclomine  20 mg Oral 4x Daily AC & HS     DULoxetine  60 mg Oral Daily     fluticasone-vilanterol  1 puff Inhalation Daily     folic acid  1 mg Oral Daily     furosemide  20 mg Oral Daily     lactulose  10 g Oral BID     mirtazapine  15 mg Oral At Bedtime     multivitamin w/minerals  1 tablet Oral Daily     nicotine  1 patch Transdermal Daily     nicotine   Transdermal Q8H     pantoprazole  40 mg Oral QAM AC     propranolol  10 mg Oral BID     spironolactone  50 mg Oral Daily     traZODone  200 mg Oral At Bedtime     vitamin B1  100 mg Oral Daily

## 2020-02-26 VITALS
OXYGEN SATURATION: 96 % | HEART RATE: 47 BPM | WEIGHT: 181.5 LBS | DIASTOLIC BLOOD PRESSURE: 67 MMHG | HEIGHT: 67 IN | RESPIRATION RATE: 16 BRPM | TEMPERATURE: 97.9 F | SYSTOLIC BLOOD PRESSURE: 137 MMHG | BODY MASS INDEX: 28.49 KG/M2

## 2020-02-26 LAB
ANION GAP SERPL CALCULATED.3IONS-SCNC: 4 MMOL/L (ref 3–14)
BUN SERPL-MCNC: 25 MG/DL (ref 7–30)
CALCIUM SERPL-MCNC: 9.1 MG/DL (ref 8.5–10.1)
CHLORIDE SERPL-SCNC: 95 MMOL/L (ref 94–109)
CO2 SERPL-SCNC: 27 MMOL/L (ref 20–32)
CREAT SERPL-MCNC: 1.24 MG/DL (ref 0.66–1.25)
ERYTHROCYTE [DISTWIDTH] IN BLOOD BY AUTOMATED COUNT: 13.9 % (ref 10–15)
GFR SERPL CREATININE-BSD FRML MDRD: 60 ML/MIN/{1.73_M2}
GLUCOSE SERPL-MCNC: 141 MG/DL (ref 70–99)
HCT VFR BLD AUTO: 30 % (ref 40–53)
HGB BLD-MCNC: 10 G/DL (ref 13.3–17.7)
MCH RBC QN AUTO: 30.9 PG (ref 26.5–33)
MCHC RBC AUTO-ENTMCNC: 33.3 G/DL (ref 31.5–36.5)
MCV RBC AUTO: 93 FL (ref 78–100)
PLATELET # BLD AUTO: 184 10E9/L (ref 150–450)
POTASSIUM SERPL-SCNC: 4.9 MMOL/L (ref 3.4–5.3)
RBC # BLD AUTO: 3.24 10E12/L (ref 4.4–5.9)
SODIUM SERPL-SCNC: 126 MMOL/L (ref 133–144)
SODIUM UR-SCNC: 51 MMOL/L
WBC # BLD AUTO: 5.2 10E9/L (ref 4–11)

## 2020-02-26 PROCEDURE — 84300 ASSAY OF URINE SODIUM: CPT | Performed by: INTERNAL MEDICINE

## 2020-02-26 PROCEDURE — 85027 COMPLETE CBC AUTOMATED: CPT | Performed by: INTERNAL MEDICINE

## 2020-02-26 PROCEDURE — 36415 COLL VENOUS BLD VENIPUNCTURE: CPT | Performed by: INTERNAL MEDICINE

## 2020-02-26 PROCEDURE — 99239 HOSP IP/OBS DSCHRG MGMT >30: CPT | Performed by: INTERNAL MEDICINE

## 2020-02-26 PROCEDURE — 80048 BASIC METABOLIC PNL TOTAL CA: CPT | Performed by: INTERNAL MEDICINE

## 2020-02-26 PROCEDURE — 25000132 ZZH RX MED GY IP 250 OP 250 PS 637: Mod: GY | Performed by: INTERNAL MEDICINE

## 2020-02-26 PROCEDURE — 25000132 ZZH RX MED GY IP 250 OP 250 PS 637: Mod: GY | Performed by: HOSPITALIST

## 2020-02-26 PROCEDURE — 25000132 ZZH RX MED GY IP 250 OP 250 PS 637: Mod: GY | Performed by: PHYSICIAN ASSISTANT

## 2020-02-26 RX ORDER — DISULFIRAM 500 MG/1
500 TABLET ORAL DAILY
Qty: 30 TABLET | Refills: 1 | Status: SHIPPED | OUTPATIENT
Start: 2020-02-27 | End: 2020-03-07

## 2020-02-26 RX ORDER — DISULFIRAM 250 MG/1
500 TABLET ORAL DAILY
Status: DISCONTINUED | OUTPATIENT
Start: 2020-02-26 | End: 2020-02-26 | Stop reason: HOSPADM

## 2020-02-26 RX ORDER — TRAZODONE HYDROCHLORIDE 100 MG/1
200 TABLET ORAL AT BEDTIME
Refills: 0 | Status: ON HOLD
Start: 2020-02-26 | End: 2020-04-08

## 2020-02-26 RX ORDER — SPIRONOLACTONE 50 MG/1
50 TABLET, FILM COATED ORAL
Qty: 60 TABLET | Refills: 1 | Status: ON HOLD | OUTPATIENT
Start: 2020-02-26 | End: 2020-04-08

## 2020-02-26 RX ORDER — HYDROXYZINE HYDROCHLORIDE 50 MG/1
50 TABLET, FILM COATED ORAL EVERY 6 HOURS PRN
Refills: 0 | Status: ON HOLD
Start: 2020-02-26 | End: 2020-04-08

## 2020-02-26 RX ADMIN — NICOTINE 1 PATCH: 14 PATCH, EXTENDED RELEASE TRANSDERMAL at 11:26

## 2020-02-26 RX ADMIN — DICYCLOMINE HYDROCHLORIDE 20 MG: 20 TABLET ORAL at 06:36

## 2020-02-26 RX ADMIN — LACTULOSE 10 G: 10 SOLUTION ORAL at 08:39

## 2020-02-26 RX ADMIN — FOLIC ACID 1 MG: 1 TABLET ORAL at 08:39

## 2020-02-26 RX ADMIN — FLUTICASONE FUROATE AND VILANTEROL TRIFENATATE 1 PUFF: 200; 25 POWDER RESPIRATORY (INHALATION) at 08:42

## 2020-02-26 RX ADMIN — DICYCLOMINE HYDROCHLORIDE 20 MG: 20 TABLET ORAL at 11:24

## 2020-02-26 RX ADMIN — DULOXETINE HYDROCHLORIDE 60 MG: 60 CAPSULE, DELAYED RELEASE ORAL at 08:39

## 2020-02-26 RX ADMIN — SPIRONOLACTONE 50 MG: 25 TABLET ORAL at 08:39

## 2020-02-26 RX ADMIN — MULTIPLE VITAMINS W/ MINERALS TAB 1 TABLET: TAB at 08:39

## 2020-02-26 RX ADMIN — DISULFIRAM 500 MG: 250 TABLET ORAL at 13:41

## 2020-02-26 RX ADMIN — PANTOPRAZOLE SODIUM 40 MG: 40 TABLET, DELAYED RELEASE ORAL at 06:36

## 2020-02-26 RX ADMIN — FUROSEMIDE 20 MG: 20 TABLET ORAL at 08:39

## 2020-02-26 ASSESSMENT — ACTIVITIES OF DAILY LIVING (ADL)
ADLS_ACUITY_SCORE: 16

## 2020-02-26 NOTE — PLAN OF CARE
Discharge    Patient discharged to home via [employer] [facility] [employer] [facility].    Care plan note:  HR mandi in 40's-50's; otherwise VSS.  O2sats 90's RA.  Patient denies pain/N/V/SOB.  No new complaints.  Antabuse started.  Discharge instructions were provided.  Patient verbalized understanding of all information received.    Listed belongings gathered and returned to patient. Yes  Care Plan and Patient education resolved: Yes  Prescriptions if needed, hard copies sent with patient  Yes  Home and hospital acquired medications returned to patient: Yes  Medication Bin checked and emptied on discharge Yes  Follow up appointment made for patient: Yes

## 2020-02-26 NOTE — DISCHARGE SUMMARY
[facility]    Discharge Summary  Hospitalist    Date of Admission:  2/3/2020  Date of Discharge:  2/26/2020  Discharging Provider: Gray Vincent MD    Discharge Diagnoses   Principal Problem:    Severe Alcohol dependence with withdrawal  Active Problems:    Anxiety and depression    Suicidal ideation    Esophageal varices (EGD with 6 varices banded 4/27/18)    Essential hypertension    JANIS on CPAP    Alcoholic cirrhosis of liver with ascites (H)      History of Present Illness   64 year old male who presents with suicidal ideation and alcohol intoxication.  He has a past medical history markable for cirrhosis with ascites, esophageal varices, depression, anxiety, alcohol use disorder, sleep apnea, COPD and anemia.  He was recently hospitalized at [facility] approximately 1 month ago secondary to weakness in alcohol withdrawal.  During that hospital stay GI became involved.  Chemical dependency also consulted as well as psychiatry.  It appears the plan initially had been for him to go to TCU with close follow-up with GI to adjust his diuretics.  He refused TCU and went home.  Since that time he has been drinking.  His medication compliance is been sporadic.  He has had worsening abdominal ascites.  He has not been taking his lactulose as he states he already has incontinence of stool.  He endorses fevers and chills as well as diaphoresis but he relates this to his alcohol.       Discussing his mood, he states that he had transient suicidal ideation.  He says he does not have a plan.  He states this happens occasionally.   in the emergency department he states his mood is better.     AST 61, Bili 1.6. Hgb 9.6.      Hospital Course   Admitted to medical floor, treated with alcohol withdrawal protocol  Plt's did drop to 49K, but up to 184K by discharge, and hgb 10.0.  Abdominal US with moderate ascites, treated with Spironolactone 50 mg bid, and creat improved to 1.24 by discharge (down from a  high of 1.45)    Was seen by psychiatry, suggest commitment a possibility.  He was here for 3 weeks, waiting for a treatment place and [facility] eventually agreed to take him, at which point he declined to go there.  He plans to quit drinking, and he wants to try using Antabuse again to help him.     At discharge he denied being suicidal.  His family was given one lat chance to commit him and they declined to do so.  Did speak with [facility]  who has been assigned to him (but has yet to meet him), and she will meet him on Thurs at 11 AM on 2/27/20 at his home.  She mentioned she could pursue commit per the family if they change their mind.     Gray Vincent MD, MD  Pager: [phone]  Cell Phone:  [phone]       Significant Results and Procedures   As above    Pending Results   These results will be followed up by Dr. Vincent  Unresulted Labs Ordered in the Past 30 Days of this Admission     No orders found from 1/4/2020 to 2/4/2020.          Code Status   DNR / DNI       Primary Care Physician   FERNANDA BRANTLEY    Physical Exam   Temp: 97.9  F (36.6  C) Temp src: Oral BP: 137/67 Pulse: (!) 47 Heart Rate: (!) 48 Resp: 16 SpO2: 96 % O2 Device: None (Room air)    Vitals:    02/03/20 0800 02/25/20 0700 02/25/20 0900   Weight: 89.6 kg (197 lb 8.5 oz) 89.6 kg (197 lb 8.5 oz) 82.3 kg (181 lb 8 oz)     Vital Signs with Ranges  Temp:  [97.9  F (36.6  C)-98.4  F (36.9  C)] 97.9  F (36.6  C)  Pulse:  [47] 47  Heart Rate:  [48-54] 48  Resp:  [16-18] 16  BP: (116-137)/(66-70) 137/67  SpO2:  [96 %-98 %] 96 %  I/O last 3 completed shifts:  In: 460 [P.O.:460]  Out: -     Exam on discharge:   Lungs clear  CV reg S1S2  MIld abd distension.     Discharge Disposition   Discharged to home  Condition at discharge: Fair    Consultations This Hospital Stay   GASTROENTEROLOGY IP CONSULT  SOCIAL WORK IP CONSULT  PALLIATIVE CARE ADULT IP CONSULT  PSYCHIATRY IP CONSULT  CHEMICAL DEPENDENCY IP  CONSULT  PSYCHIATRY IP CONSULT  PHYSICAL THERAPY ADULT IP CONSULT  OCCUPATIONAL THERAPY ADULT IP CONSULT  CHEMICAL DEPENDENCY IP CONSULT  PSYCHIATRY IP CONSULT  CHEMICAL DEPENDENCY IP CONSULT  PSYCHIATRY IP CONSULT  CHEMICAL DEPENDENCY IP CONSULT  PSYCHIATRY IP CONSULT  PSYCHIATRY IP CONSULT    Time Spent on this Encounter   I spent a total of 35 minutes discharging this patient.     Discharge Orders      Reason for your hospital stay    Alcohol abuse with cirrhosis and ascites.     Follow-up and recommended labs and tests     Keep appointment at your house at 11 AM tomorrow with [facility]  -- Michaela Fernandez, cell phone [phone].     Follow-up with Fernanda Elias in 1 week, check BMP then.     Activity    Your activity upon discharge: activity as tolerated     Discharge Instructions    Call Dr. Burns if any medical questions at Cell Phone [phone].     Activity    No driving until follow-up with Fernanda Elias and approved for driving.     DNR/DNI     Diet    Follow this diet upon discharge: Orders Placed This Encounter      Regular diet, no added salt, limit fluids to 3 glasses a day.  NO ALCOHOL.     Discharge Medications   Current Discharge Medication List      START taking these medications    Details   disulfiram (ANTABUSE) 500 MG tablet Take 1 tablet (500 mg) by mouth daily  Qty: 30 tablet, Refills: 1    Comments: Future refills by PCP Dr. FERNANDA ELIAS with phone number [phone].  Associated Diagnoses: Alcohol withdrawal syndrome without complication (H)      spironolactone (ALDACTONE) 50 MG tablet Take 1 tablet (50 mg) by mouth 2 times daily  Qty: 60 tablet, Refills: 1    Comments: Future refills by PCP Dr. FERNANDA ELIAS with phone number [phone].  Associated Diagnoses: Alcoholic cirrhosis of liver with ascites (H)         CONTINUE these medications which have CHANGED    Details   hydrOXYzine (ATARAX) 50 MG tablet Take 1 tablet (50 mg) by  mouth every 6 hours as needed for anxiety Stopped at last discharge, but reports taking as needed.  Refills: 0    Associated Diagnoses: Anxiety and depression      traZODone (DESYREL) 100 MG tablet Take 2 tablets (200 mg) by mouth At Bedtime  Refills: 0    Associated Diagnoses: Alcohol withdrawal syndrome without complication (H)         CONTINUE these medications which have NOT CHANGED    Details   albuterol (PROAIR HFA/PROVENTIL HFA/VENTOLIN HFA) 108 (90 BASE) MCG/ACT Inhaler Inhale 2 puffs into the lungs every 6 hours as needed       DULoxetine (CYMBALTA) 60 MG capsule Take 60 mg by mouth daily Stopped at last admission, but reports still taking.      fluticasone-vilanterol (BREO ELLIPTA) 200-25 MCG/INH inhaler Inhale 1 puff into the lungs daily       folic acid (FOLVITE) 1 MG tablet Take 1 tablet (1 mg) by mouth daily  Qty: 30 tablet, Refills: 0    Comments: Future refills by PCP Dr. FERNANDA BRANTLEY with phone number [phone].  Associated Diagnoses: Alcoholic cirrhosis of liver with ascites (H)      mirtazapine (REMERON) 15 MG tablet Take 1 tablet (15 mg) by mouth At Bedtime  Qty: 30 tablet, Refills: 0    Comments: Future refills by PCP Dr. FERNANDA BRANTLEY with phone number [phone].  Associated Diagnoses: Alcohol withdrawal syndrome without complication (H)      multivitamin, therapeutic with minerals (THERA-VIT-M) TABS tablet Take 1 tablet by mouth daily  Qty: 30 each, Refills: 0    Associated Diagnoses: Alcoholic intoxication without complication (H)      pantoprazole (PROTONIX) 40 MG EC tablet Take 1 tablet (40 mg) by mouth every morning (before breakfast)  Qty: 30 tablet, Refills: 0    Comments: Future refills by PCP Dr. FERNANDA BRANTLEY with phone number [phone].  Associated Diagnoses: Alcoholic cirrhosis of liver with ascites (H)      QUEtiapine (SEROQUEL) 50 MG tablet Take 1 tablet (50 mg) by mouth nightly as needed (sleep)  Qty: 30 tablet, Refills: 0    Associated Diagnoses:  Alcohol withdrawal syndrome without complication (H)      tamsulosin (FLOMAX) 0.4 MG capsule Take 0.4 mg by mouth daily Was discontinued at last admission in 10/2019, but still taking per patient.      lactulose (CHRONULAC) 10 GM/15ML solution Take 15 mLs (10 g) by mouth 3 times daily    Associated Diagnoses: Alcohol withdrawal syndrome without complication (H)      order for DME Equipment being ordered: Walker Wheels () and Walker ()  Treatment Diagnosis: difficulty walking  Qty: 1 each, Refills: 0    Associated Diagnoses: Spinal stenosis, lumbar region, with neurogenic claudication      propranolol (INDERAL) 10 MG tablet Take 1 tablet (10 mg) by mouth 2 times daily  Qty: 60 tablet, Refills: 0    Comments: Future refills by PCP Dr. FERNANDA BRANTLEY with phone number [phone].  Associated Diagnoses: Alcoholic cirrhosis of liver with ascites (H)         STOP taking these medications       vitamin B1 (THIAMINE) 100 MG tablet Comments:   Reason for Stopping:             Allergies   Allergies   Allergen Reactions     Amlodipine Swelling     Lisinopril      Other reaction(s): Angioedema  Mouth and tongue swelling   Mouth and tongue swelling        Data   Most Recent 3 CBC's:  Recent Labs   Lab Test 02/26/20  0821 02/08/20  0735 02/07/20  0758   WBC 5.2 3.3* 3.4*   HGB 10.0* 8.8* 8.2*   MCV 93 96 97    67* 58*      Most Recent 3 BMP's:  Recent Labs   Lab Test 02/26/20  0821 02/17/20  0904 02/13/20  1224   * 132* 134   POTASSIUM 4.9 4.2 4.4   CHLORIDE 95 102 105   CO2 27 23 26   BUN 25 23 27   CR 1.24 1.38* 1.45*   ANIONGAP 4 7 3   KEV 9.1 9.0 9.0   * 141* 102*     Most Recent 2 LFT's:  Recent Labs   Lab Test 02/07/20  0758 02/04/20  0800   AST 37 53*   ALT 19 22   ALKPHOS 194* 234*   BILITOTAL 1.0 2.5*     Most Recent INR's and Anticoagulation Dosing History:  Anticoagulation Dose History     Recent Dosing and Labs Latest Ref Rng & Units 12/24/2019 12/31/2019 1/7/2020 2/3/2020 2/6/2020  2/7/2020 2/24/2020    INR 0.86 - 1.14 1.23(H) 1.24(H) 1.26(H) 1.18(H) 1.29(H) 1.36(H) 1.24(H)        Most Recent 3 Troponin's:  Recent Labs   Lab Test 04/26/18  1940   TROPI <0.015     Most Recent Cholesterol Panel:  Recent Labs   Lab Test 08/13/19  0744   CHOL 113   LDL 70   HDL 15*   TRIG 140     Most Recent 6 Bacteria Isolates From Any Culture (See EPIC Reports for Culture Details):  Recent Labs   Lab Test 02/03/20  0940 01/07/20  1600 10/28/19  1244 08/02/19  0840 09/29/18  1359 09/29/18  1352   CULT No growth No growth No growth No growth No growth No growth     Most Recent TSH, T4 and A1c Labs:  Recent Labs   Lab Test 03/22/18  0430 02/08/17  1640   TSH  --  1.51   A1C Canceled, Test credited  --

## 2020-02-26 NOTE — PLAN OF CARE
DATE & TIME: 2/26/2020 1900-0730  Cognitive Concerns/ Orientation : A&Ox4, forgetful.   BEHAVIOR & AGGRESSION TOOL COLOR: Green  CIWA SCORE: NA    ABNL VS/O2: VSS on RA except mandi into upper 40's low 50s.  MOBILITY: Independent  PAIN MANAGMENT: Denies  DIET: low Na  BOWEL/BLADDER: Continent  ABNL LAB/BG:   DRAIN/DEVICES: PIV SL  TELEMETRY RHYTHM: NA  SKIN: Bruising and scabs  TESTS/PROCEDURES: NA   D/C DAY/GOALS/PLACE: Pending - placement on a treatment facility  OTHER IMPORTANT INFO:

## 2020-02-26 NOTE — CONSULTS
[facility]  Psychiatry Consultation - Follow-up note      Interim History:   Reason for consultation: Follow-up to assess response to treatment    The patient was last seen by the consultation service on Sunday where he met with Dr. Means who continued with the patient's plan of care at that time.  No acute issues since then however there has been some difficulty in finding an accepting residential treatment facility.  The patient's insurance requires a hospital-based facility.  He has been declined from [facility].  He is awaiting a decision from [facility].    On examination today, the patient reviewed with me some anxiety and frustration related to difficulty finding placement.  He anticipates that a bed will not be made available to him and that he will end up returning home.  He seems okay with this outcome however he would prefer admission to a residential treatment facility.    He denies suicidal and homicidal thoughts.  Mood is fairly stable without significant depressive symptoms.  Sleeping well.  Eating adequately.  Energy is fair.  Concentration is adequate.  Tolerating his medications without side effects.    He continues to desire sobriety and reports no plan or intent to relapse on alcohol.           Medications:       dicyclomine  20 mg Oral 4x Daily AC & HS     DULoxetine  60 mg Oral Daily     fluticasone-vilanterol  1 puff Inhalation Daily     folic acid  1 mg Oral Daily     furosemide  20 mg Oral Daily     lactulose  10 g Oral BID     mirtazapine  15 mg Oral At Bedtime     multivitamin w/minerals  1 tablet Oral Daily     nicotine  1 patch Transdermal Daily     nicotine   Transdermal Q8H     pantoprazole  40 mg Oral QAM AC     propranolol  10 mg Oral BID     spironolactone  50 mg Oral Daily     traZODone  200 mg Oral At Bedtime     vitamin B1  100 mg Oral Daily          Allergies:     Allergies   Allergen Reactions     Amlodipine Swelling     Lisinopril      Other  "reaction(s): Angioedema  Mouth and tongue swelling   Mouth and tongue swelling             Labs:   No results found for this or any previous visit (from the past 24 hour(s)).       Psychiatric Examination:     /70 (BP Location: Right arm)   Pulse (!) 49   Temp 98  F (36.7  C) (Oral)   Resp 18   Ht 1.702 m (5' 7\")   Wt 82.3 kg (181 lb 8 oz)   SpO2 98%   BMI 28.43 kg/m    Weight is 181 lbs 8 oz  Body mass index is 28.43 kg/m .  Orthostatic Vitals     None            Appearance: awake, alert  Attitude:  cooperative  Eye Contact:  fair  Mood:  anxious  Affect:  appropriate and in normal range  Speech:  clear, coherent  Psychomotor Behavior:  no evidence of tardive dyskinesia, dystonia, or tics  Throught Process:  linear  Associations:  no loose associations  Thought Content:  no evidence of suicidal ideation or homicidal ideation and no evidence of psychotic thought  Insight:  partial  Judgement:  limited  Oriented to:  time, person, and place  Attention Span and Concentration:  fair  Recent and Remote Memory:  fair           DIagnoses:     Alcohol use disorder, severe  Unspecified depressive disorder  Hepatic cirrhosis with ascites      Recommendations:     Awaiting bed availability at a hospital-based residential chemical dependency treatment facility.    Continue Cymbalta for mood management.  Mood appears stable without suicidal ideation.     Please reconsult with psychiatry as needed.  Jeff Brito MD   Text Page          "

## 2020-02-26 NOTE — PROGRESS NOTES
"D: SW following for discharge planning.   I: Pt was accepted today at [facility] for CD treatment. Sully at [facility] called pt directly to schedule an intake. Per Sully, pt declined admission when he leaned [facility] was non-smoking. By the time SW spoke with Sully, the bed was already given away and another bed not available until second week in March. SW met with pt. He states he does not want to go to [facility] and the program \"has not changed\" from the last time he was there. Therefore, he does not believe it would be beneficial. Dr Burns will discharge him home today. Pt has been assigned a [facility]  through the [place] police department, Michaela Kenny, [phone]. DANIELE has updated her. She will meet with pt at his home, Thursday, at 1100. Since pt has had multiple contacts with the police department a referral was made to her. However, she has not been able to follow up with him until now due to his hospitalizations.   P: Discharge today. DANIELE can assist with arranging a ride, as needed.     JULIA John, LGSW  [phone]  [facility]    "

## 2020-02-26 NOTE — DISCHARGE INSTRUCTIONS
Michaela Kenny, [facility] , will see you at home, Thursday, February 27th at 11:00am    You have an appointment with Dr. Elias tomorrow 2/27/2020 at 1:20pm.

## 2020-03-07 ENCOUNTER — HOSPITAL ENCOUNTER (INPATIENT)
Facility: CLINIC | Age: 65
LOS: 33 days | Discharge: PSYCHIATRIC OR SUBSTANCE USE FACILITY | DRG: 392 | End: 2020-04-09
Attending: EMERGENCY MEDICINE | Admitting: STUDENT IN AN ORGANIZED HEALTH CARE EDUCATION/TRAINING PROGRAM
Payer: MEDICARE

## 2020-03-07 DIAGNOSIS — F41.9 ANXIETY AND DEPRESSION: ICD-10-CM

## 2020-03-07 DIAGNOSIS — R18.8 OTHER ASCITES: ICD-10-CM

## 2020-03-07 DIAGNOSIS — F10.920 ALCOHOLIC INTOXICATION WITHOUT COMPLICATION (H): ICD-10-CM

## 2020-03-07 DIAGNOSIS — R14.0 ABDOMINAL DISTENSION: ICD-10-CM

## 2020-03-07 DIAGNOSIS — F32.A DEPRESSION, UNSPECIFIED DEPRESSION TYPE: Primary | ICD-10-CM

## 2020-03-07 DIAGNOSIS — K92.2 GASTROINTESTINAL HEMORRHAGE, UNSPECIFIED GASTROINTESTINAL HEMORRHAGE TYPE: ICD-10-CM

## 2020-03-07 DIAGNOSIS — F10.930 ALCOHOL WITHDRAWAL SYNDROME WITHOUT COMPLICATION (H): ICD-10-CM

## 2020-03-07 DIAGNOSIS — K70.31 ALCOHOLIC CIRRHOSIS OF LIVER WITH ASCITES (H): ICD-10-CM

## 2020-03-07 DIAGNOSIS — F32.A ANXIETY AND DEPRESSION: ICD-10-CM

## 2020-03-07 DIAGNOSIS — J43.9 PULMONARY EMPHYSEMA, UNSPECIFIED EMPHYSEMA TYPE (H): ICD-10-CM

## 2020-03-07 LAB
ANION GAP SERPL CALCULATED.3IONS-SCNC: 15 MMOL/L (ref 3–14)
BASOPHILS # BLD AUTO: 0 10E9/L (ref 0–0.2)
BASOPHILS NFR BLD AUTO: 0.4 %
BUN SERPL-MCNC: 34 MG/DL (ref 7–30)
CALCIUM SERPL-MCNC: 8.4 MG/DL (ref 8.5–10.1)
CHLORIDE SERPL-SCNC: 103 MMOL/L (ref 94–109)
CO2 SERPL-SCNC: 17 MMOL/L (ref 20–32)
CREAT SERPL-MCNC: 1.2 MG/DL (ref 0.66–1.25)
DIFFERENTIAL METHOD BLD: ABNORMAL
EOSINOPHIL # BLD AUTO: 0 10E9/L (ref 0–0.7)
EOSINOPHIL NFR BLD AUTO: 0.2 %
ERYTHROCYTE [DISTWIDTH] IN BLOOD BY AUTOMATED COUNT: 14.3 % (ref 10–15)
ETHANOL SERPL-MCNC: 0.29 G/DL
GFR SERPL CREATININE-BSD FRML MDRD: 63 ML/MIN/{1.73_M2}
GLUCOSE SERPL-MCNC: 70 MG/DL (ref 70–99)
HCT VFR BLD AUTO: 28 % (ref 40–53)
HEMOCCULT STL QL: POSITIVE
HGB BLD-MCNC: 7.3 G/DL (ref 13.3–17.7)
HGB BLD-MCNC: 8.5 G/DL (ref 13.3–17.7)
HGB BLD-MCNC: 9.4 G/DL (ref 13.3–17.7)
IMM GRANULOCYTES # BLD: 0 10E9/L (ref 0–0.4)
IMM GRANULOCYTES NFR BLD: 0.1 %
INR PPP: 1.24 (ref 0.86–1.14)
LYMPHOCYTES # BLD AUTO: 0.5 10E9/L (ref 0.8–5.3)
LYMPHOCYTES NFR BLD AUTO: 4.6 %
MCH RBC QN AUTO: 30.8 PG (ref 26.5–33)
MCHC RBC AUTO-ENTMCNC: 33.6 G/DL (ref 31.5–36.5)
MCV RBC AUTO: 92 FL (ref 78–100)
MONOCYTES # BLD AUTO: 0.3 10E9/L (ref 0–1.3)
MONOCYTES NFR BLD AUTO: 2.8 %
NEUTROPHILS # BLD AUTO: 9.5 10E9/L (ref 1.6–8.3)
NEUTROPHILS NFR BLD AUTO: 91.9 %
NRBC # BLD AUTO: 0 10*3/UL
NRBC BLD AUTO-RTO: 0 /100
PLATELET # BLD AUTO: 171 10E9/L (ref 150–450)
POTASSIUM SERPL-SCNC: 4.2 MMOL/L (ref 3.4–5.3)
RBC # BLD AUTO: 3.05 10E12/L (ref 4.4–5.9)
SODIUM SERPL-SCNC: 135 MMOL/L (ref 133–144)
WBC # BLD AUTO: 10.3 10E9/L (ref 4–11)

## 2020-03-07 PROCEDURE — HZ2ZZZZ DETOXIFICATION SERVICES FOR SUBSTANCE ABUSE TREATMENT: ICD-10-PCS | Performed by: STUDENT IN AN ORGANIZED HEALTH CARE EDUCATION/TRAINING PROGRAM

## 2020-03-07 PROCEDURE — 96375 TX/PRO/DX INJ NEW DRUG ADDON: CPT

## 2020-03-07 PROCEDURE — 99223 1ST HOSP IP/OBS HIGH 75: CPT | Mod: AI | Performed by: STUDENT IN AN ORGANIZED HEALTH CARE EDUCATION/TRAINING PROGRAM

## 2020-03-07 PROCEDURE — C9113 INJ PANTOPRAZOLE SODIUM, VIA: HCPCS | Performed by: EMERGENCY MEDICINE

## 2020-03-07 PROCEDURE — 25800030 ZZH RX IP 258 OP 636: Performed by: STUDENT IN AN ORGANIZED HEALTH CARE EDUCATION/TRAINING PROGRAM

## 2020-03-07 PROCEDURE — 25000125 ZZHC RX 250: Performed by: EMERGENCY MEDICINE

## 2020-03-07 PROCEDURE — 99285 EMERGENCY DEPT VISIT HI MDM: CPT | Mod: 25

## 2020-03-07 PROCEDURE — 36415 COLL VENOUS BLD VENIPUNCTURE: CPT | Performed by: STUDENT IN AN ORGANIZED HEALTH CARE EDUCATION/TRAINING PROGRAM

## 2020-03-07 PROCEDURE — 25000132 ZZH RX MED GY IP 250 OP 250 PS 637: Mod: GY | Performed by: STUDENT IN AN ORGANIZED HEALTH CARE EDUCATION/TRAINING PROGRAM

## 2020-03-07 PROCEDURE — 80320 DRUG SCREEN QUANTALCOHOLS: CPT | Performed by: EMERGENCY MEDICINE

## 2020-03-07 PROCEDURE — 25000128 H RX IP 250 OP 636: Performed by: EMERGENCY MEDICINE

## 2020-03-07 PROCEDURE — 80048 BASIC METABOLIC PNL TOTAL CA: CPT | Performed by: EMERGENCY MEDICINE

## 2020-03-07 PROCEDURE — 25800030 ZZH RX IP 258 OP 636: Performed by: EMERGENCY MEDICINE

## 2020-03-07 PROCEDURE — 96374 THER/PROPH/DIAG INJ IV PUSH: CPT

## 2020-03-07 PROCEDURE — 85025 COMPLETE CBC W/AUTO DIFF WBC: CPT | Performed by: EMERGENCY MEDICINE

## 2020-03-07 PROCEDURE — C9113 INJ PANTOPRAZOLE SODIUM, VIA: HCPCS | Performed by: STUDENT IN AN ORGANIZED HEALTH CARE EDUCATION/TRAINING PROGRAM

## 2020-03-07 PROCEDURE — 82272 OCCULT BLD FECES 1-3 TESTS: CPT | Performed by: EMERGENCY MEDICINE

## 2020-03-07 PROCEDURE — 96361 HYDRATE IV INFUSION ADD-ON: CPT

## 2020-03-07 PROCEDURE — 12000000 ZZH R&B MED SURG/OB

## 2020-03-07 PROCEDURE — 85610 PROTHROMBIN TIME: CPT | Performed by: EMERGENCY MEDICINE

## 2020-03-07 PROCEDURE — 25000128 H RX IP 250 OP 636: Performed by: STUDENT IN AN ORGANIZED HEALTH CARE EDUCATION/TRAINING PROGRAM

## 2020-03-07 PROCEDURE — 85018 HEMOGLOBIN: CPT | Performed by: STUDENT IN AN ORGANIZED HEALTH CARE EDUCATION/TRAINING PROGRAM

## 2020-03-07 RX ORDER — ONDANSETRON 4 MG/1
4 TABLET, ORALLY DISINTEGRATING ORAL EVERY 6 HOURS PRN
Status: DISCONTINUED | OUTPATIENT
Start: 2020-03-07 | End: 2020-04-09 | Stop reason: HOSPADM

## 2020-03-07 RX ORDER — LORAZEPAM 1 MG/1
1-2 TABLET ORAL EVERY 30 MIN PRN
Status: DISCONTINUED | OUTPATIENT
Start: 2020-03-07 | End: 2020-03-25

## 2020-03-07 RX ORDER — LORAZEPAM 2 MG/ML
1 INJECTION INTRAMUSCULAR ONCE
Status: COMPLETED | OUTPATIENT
Start: 2020-03-07 | End: 2020-03-07

## 2020-03-07 RX ORDER — QUETIAPINE FUMARATE 25 MG/1
50 TABLET, FILM COATED ORAL
Status: DISCONTINUED | OUTPATIENT
Start: 2020-03-07 | End: 2020-03-25

## 2020-03-07 RX ORDER — TRAZODONE HYDROCHLORIDE 50 MG/1
200 TABLET, FILM COATED ORAL AT BEDTIME
Status: DISCONTINUED | OUTPATIENT
Start: 2020-03-07 | End: 2020-03-07

## 2020-03-07 RX ORDER — DULOXETIN HYDROCHLORIDE 60 MG/1
60 CAPSULE, DELAYED RELEASE ORAL DAILY
Status: DISCONTINUED | OUTPATIENT
Start: 2020-03-08 | End: 2020-04-09 | Stop reason: HOSPADM

## 2020-03-07 RX ORDER — ONDANSETRON 2 MG/ML
4 INJECTION INTRAMUSCULAR; INTRAVENOUS EVERY 6 HOURS PRN
Status: DISCONTINUED | OUTPATIENT
Start: 2020-03-07 | End: 2020-04-09 | Stop reason: HOSPADM

## 2020-03-07 RX ORDER — LACTULOSE 10 G/15ML
30 SOLUTION ORAL 2 TIMES DAILY
Status: DISCONTINUED | OUTPATIENT
Start: 2020-03-07 | End: 2020-03-14

## 2020-03-07 RX ORDER — PROCHLORPERAZINE MALEATE 5 MG
5 TABLET ORAL EVERY 6 HOURS PRN
Status: DISCONTINUED | OUTPATIENT
Start: 2020-03-07 | End: 2020-03-25

## 2020-03-07 RX ORDER — LIDOCAINE 40 MG/G
CREAM TOPICAL
Status: DISCONTINUED | OUTPATIENT
Start: 2020-03-07 | End: 2020-03-08 | Stop reason: HOSPADM

## 2020-03-07 RX ORDER — FUROSEMIDE 40 MG
40 TABLET ORAL DAILY
Status: ON HOLD | COMMUNITY
End: 2020-04-08

## 2020-03-07 RX ORDER — SODIUM CHLORIDE, SODIUM LACTATE, POTASSIUM CHLORIDE, CALCIUM CHLORIDE 600; 310; 30; 20 MG/100ML; MG/100ML; MG/100ML; MG/100ML
INJECTION, SOLUTION INTRAVENOUS CONTINUOUS
Status: DISCONTINUED | OUTPATIENT
Start: 2020-03-07 | End: 2020-03-12

## 2020-03-07 RX ORDER — LACTULOSE 20 G/30ML
30 SOLUTION ORAL 2 TIMES DAILY
Status: ON HOLD | COMMUNITY
End: 2020-04-08

## 2020-03-07 RX ORDER — QUETIAPINE FUMARATE 50 MG/1
50 TABLET, FILM COATED ORAL
Status: ON HOLD | COMMUNITY
End: 2020-04-08

## 2020-03-07 RX ORDER — PROCHLORPERAZINE 25 MG
12.5 SUPPOSITORY, RECTAL RECTAL EVERY 12 HOURS PRN
Status: DISCONTINUED | OUTPATIENT
Start: 2020-03-07 | End: 2020-03-25

## 2020-03-07 RX ORDER — NALOXONE HYDROCHLORIDE 0.4 MG/ML
.1-.4 INJECTION, SOLUTION INTRAMUSCULAR; INTRAVENOUS; SUBCUTANEOUS
Status: DISCONTINUED | OUTPATIENT
Start: 2020-03-07 | End: 2020-04-09 | Stop reason: HOSPADM

## 2020-03-07 RX ORDER — LORAZEPAM 2 MG/ML
1-2 INJECTION INTRAMUSCULAR EVERY 30 MIN PRN
Status: DISCONTINUED | OUTPATIENT
Start: 2020-03-07 | End: 2020-03-25

## 2020-03-07 RX ORDER — LIDOCAINE 40 MG/G
CREAM TOPICAL
Status: DISCONTINUED | OUTPATIENT
Start: 2020-03-07 | End: 2020-04-09 | Stop reason: HOSPADM

## 2020-03-07 RX ORDER — SODIUM CHLORIDE 9 MG/ML
1000 INJECTION, SOLUTION INTRAVENOUS CONTINUOUS
Status: DISCONTINUED | OUTPATIENT
Start: 2020-03-07 | End: 2020-03-07 | Stop reason: ALTCHOICE

## 2020-03-07 RX ORDER — ACETAMINOPHEN 325 MG/1
650 TABLET ORAL EVERY 6 HOURS PRN
Status: DISCONTINUED | OUTPATIENT
Start: 2020-03-07 | End: 2020-04-09 | Stop reason: HOSPADM

## 2020-03-07 RX ORDER — TRAZODONE HYDROCHLORIDE 50 MG/1
50 TABLET, FILM COATED ORAL
Status: DISCONTINUED | OUTPATIENT
Start: 2020-03-07 | End: 2020-03-25

## 2020-03-07 RX ADMIN — LORAZEPAM 2 MG: 2 INJECTION INTRAMUSCULAR; INTRAVENOUS at 22:14

## 2020-03-07 RX ADMIN — TRAZODONE HYDROCHLORIDE 50 MG: 50 TABLET ORAL at 22:14

## 2020-03-07 RX ADMIN — FOLIC ACID: 5 INJECTION, SOLUTION INTRAMUSCULAR; INTRAVENOUS; SUBCUTANEOUS at 13:43

## 2020-03-07 RX ADMIN — ACETAMINOPHEN 650 MG: 325 TABLET, FILM COATED ORAL at 23:45

## 2020-03-07 RX ADMIN — LORAZEPAM 1 MG: 1 TABLET ORAL at 19:37

## 2020-03-07 RX ADMIN — SODIUM CHLORIDE 1000 ML: 9 INJECTION, SOLUTION INTRAVENOUS at 13:11

## 2020-03-07 RX ADMIN — LORAZEPAM 1 MG: 1 TABLET ORAL at 16:36

## 2020-03-07 RX ADMIN — SODIUM CHLORIDE, POTASSIUM CHLORIDE, SODIUM LACTATE AND CALCIUM CHLORIDE: 600; 310; 30; 20 INJECTION, SOLUTION INTRAVENOUS at 16:20

## 2020-03-07 RX ADMIN — LACTULOSE 30 ML: 10 SOLUTION ORAL at 15:02

## 2020-03-07 RX ADMIN — LORAZEPAM 1 MG: 2 INJECTION INTRAMUSCULAR; INTRAVENOUS at 13:11

## 2020-03-07 RX ADMIN — LORAZEPAM 1 MG: 1 TABLET ORAL at 23:45

## 2020-03-07 RX ADMIN — PANTOPRAZOLE SODIUM 40 MG: 40 INJECTION, POWDER, FOR SOLUTION INTRAVENOUS at 13:36

## 2020-03-07 RX ADMIN — PANTOPRAZOLE SODIUM 40 MG: 40 INJECTION, POWDER, FOR SOLUTION INTRAVENOUS at 22:14

## 2020-03-07 ASSESSMENT — ACTIVITIES OF DAILY LIVING (ADL)
DRESS: 0-->INDEPENDENT
RETIRED_COMMUNICATION: 0-->UNDERSTANDS/COMMUNICATES WITHOUT DIFFICULTY
TOILETING: 0-->INDEPENDENT
RETIRED_EATING: 0-->INDEPENDENT
ADLS_ACUITY_SCORE: 15
BATHING: 0-->INDEPENDENT
SWALLOWING: 0-->SWALLOWS FOODS/LIQUIDS WITHOUT DIFFICULTY
ADLS_ACUITY_SCORE: 15

## 2020-03-07 ASSESSMENT — ENCOUNTER SYMPTOMS
FEVER: 0
BLOOD IN STOOL: 1

## 2020-03-07 NOTE — PHARMACY-ADMISSION MEDICATION HISTORY
"Pharmacy Medication History  Admission medication history interview status for the 3/7/2020  admission is complete. See EPIC admission navigator for prior to admission medications     Medication history sources: Patient and Surescripts  Medication history source reliability: Poor, patient states that he has not been taking his medications for a long time \"since he was last here\". List represents what he is supposed to be on to the best of his recollection.   Adherence assessment: Poor    Significant changes made to the medication list:  Added: none  Changed: lactulose (dose)  Removed: propranolol (HR too low), pantoprazole, tamsulosin      Additional medication history information:   - States he has not been taking his medications \"since he left here\" with the exception of his furosemide.   - States at the end of interview that he probably should take his medicaitons and that he may feel better.      Medication reconciliation completed by provider prior to medication history? No    Time spent in this activity: 30 minutes    Prior to Admission medications    Medication Sig Last Dose Taking? Auth Provider   furosemide (LASIX) 40 MG tablet Take 40 mg by mouth daily Past Week at Unknown time Yes Unknown, Entered By History   albuterol (PROAIR HFA/PROVENTIL HFA/VENTOLIN HFA) 108 (90 BASE) MCG/ACT Inhaler Inhale 2 puffs into the lungs every 6 hours as needed  More than a month at Unknown time  Unknown, Entered By History   DULoxetine (CYMBALTA) 60 MG capsule Take 60 mg by mouth daily  More than a month at Unknown time  Unknown, Entered By History   fluticasone-vilanterol (BREO ELLIPTA) 200-25 MCG/INH inhaler Inhale 1 puff into the lungs daily  More than a month at Unknown time  Unknown, Entered By History   folic acid (FOLVITE) 1 MG tablet Take 1 tablet (1 mg) by mouth daily More than a month at Unknown time  Tiff Rodriguez MD   hydrOXYzine (ATARAX) 50 MG tablet Take 1 tablet (50 mg) by mouth every 6 hours as needed for " anxiety Stopped at last discharge, but reports taking as needed. More than a month at Unknown time  Gray Burns MD   lactulose 20 GM/30ML SOLN Take 30 mLs by mouth 2 times daily More than a month at Unknown time  Unknown, Entered By History   mirtazapine (REMERON) 15 MG tablet Take 1 tablet (15 mg) by mouth At Bedtime More than a month at Unknown time  Tiff Rodriguez MD   multivitamin, therapeutic with minerals (THERA-VIT-M) TABS tablet Take 1 tablet by mouth daily More than a month at Unknown time  Jude Duarte DO   order for DME Equipment being ordered: Walker Wheels () and Walker ()  Treatment Diagnosis: difficulty walking DME at DME  Donell Lucas MD   QUEtiapine (SEROQUEL) 50 MG tablet Take 50 mg by mouth nightly as needed More than a month at Unknown time  Unknown, Entered By History   spironolactone (ALDACTONE) 50 MG tablet Take 1 tablet (50 mg) by mouth 2 times daily More than a month at Unknown time  Gray Burns MD   traZODone (DESYREL) 100 MG tablet Take 2 tablets (200 mg) by mouth At Bedtime More than a month at Unknown time  Gray Burns MD

## 2020-03-07 NOTE — PROGRESS NOTES
RECEIVING UNIT ED HANDOFF REVIEW    ED Nurse Handoff Report was reviewed by: Sheng Lawrence RN on March 7, 2020 at 1:39 PM

## 2020-03-07 NOTE — H&P
[facility]    History and Physical - Hospitalist Service       Date of Admission:  3/7/2020    Assessment & Plan     Jim Richard is a 64 year old male who presents with alcohol intoxication.    Melena  GIB  Assessment: presents with one week hx of melena.  Baseline 9-10 recent. Presumed 2/2 Etoh, liver disease, varices. On admission Hgb 9.4. Last EGD 09/2019 shows Normal esophagus with portal hypertensive gastropathy. Normal first portion of the duodenum and second portion of the duodenum. There was no sign of GI bleeding at that time. Current picture concerning for upper GIB.   Plan:  - admit to inpatient  - GI consulted  - Clears for now  - IVF  - Protonix IV  - No anticoagulation  - Follow vitals/temp    Cirrhosis with ascites  Esophageal varices  Assessment: PTA lactulose 10 g TID, propranolol 10 mg BID  Plan:  - hold lactulose for now given GIB  - will ask GI to see      Depression with hx of suicidal ideation  Anxiety  Assessment: PTA mirtazapine 15 mg at HS, seroquel 50 mg daily, trazodone 100 mg at HS Poor medication compliance, he has not taken these meds for few weeks now. Psych had seen previous hospitalization. He denies ongoing SI in the ED.   Plan:  - Resume PTA Regimen  - Psychiatry consulted, wonder about commitment given ongoing alcohol abuse causing significant risks to health.      Alcohol intoxication  Alcohol use disorder  Assessment: Drinking up to 1-1.75 L vodka daily. Etoh level on admission at 0.29. Does have hx of DT's, but no h/o withdrawal seizures.   Plan:  - IV vitamins  - CIWA  - prn lorazepam     JANIS on CPAP  CPAP per home settings     COPD  Tobacco abuse  Assessment: PTA prn albuterol, Breo Ellipta  Plan:  - continue inhalers          Diet: Clear Liquid Diet    DVT Prophylaxis: Pneumatic Compression Devices  Leger Catheter: not present  Code Status: DNR/DNI      Disposition Plan   Expected discharge: 2 - 3 days, recommended to prior living arrangement once  hemoglobin stable.  Entered: Chivo Murcia MD 03/07/2020, 2:15 PM     The patient's care was discussed with the Patient and ED Provider.    Chivo Murcia MD  [facility]    ______________________________________________________________________    Chief Complaint     Melena    History is obtained from the patient    History of Present Illness   Jim Richard is a 65 year old male with past medical history of alcohol abuse, cirrhosis, hypertension who presents for evaluation of her stools.    Patient reports that for the past week, he has been incontinent with black tarry stools, and this is led to his home being essentially covered in stool.  He denies seeing any bright red blood.  He denies any hemoptysis or hematemesis.  He denies any abdominal pain, no nausea or vomiting.  He is not on any blood thinners, he is not on aspirin.  He reports that he drinks roughly 1.75 L of alcohol daily, drinks mainly vodka.  He denies any current suicidal ideations.  He denies any significant abdominal distention.  He denies any chest pain or shortness of breath, he denies any fevers or chills, he has no cough.  He denies any headaches, no lightheadedness, no vision changes.  He denies any localized weakness or numbness of extremities.  He endorses he has been having significant tremors all through the day today.  He feels like he is about to go through significant alcohol withdrawal.  He has no previous history of alcohol withdrawal seizures.  He denies any current hallucinations.  He otherwise has no other complaints this time    Review of Systems    The 10 point Review of Systems is negative other than noted in the HPI or here.     Past Medical History    I have reviewed this patient's medical history and updated it with pertinent information if needed.   Past Medical History:   Diagnosis Date     Alcoholism (H) 1/14/2013    had treatment at [facility]     Anxiety      Coronary artery disease 09/09/2014     Nuclear stress test with slight fixed defect inferiorly, no ischemia     Depression     w/anxiety     Esophageal varices with bleeding 04/28/2018    6 varices banded on EGD     Heart attack (H)      Hepatomegaly     Fatty liver, chronic alcoholic     Hyperlipemia      Hypertension      JANIS on CPAP      Peripheral vascular disease (H)      PVD (peripheral vascular disease) (H)      SDH (subdural hematoma) (H) 04/26/2018    Right side, resolved spontaneously     Spinal stenosis        Past Surgical History   I have reviewed this patient's surgical history and updated it with pertinent information if needed.  Past Surgical History:   Procedure Laterality Date     APPENDECTOMY       BYPASS GRAFT FEMOROPOPLITEAL  6/12/2012    Procedure: BYPASS GRAFT FEMOROPOPLITEAL;  LEFT FEMORAL TO ABOVE KNEE POPLITEAL BYPASS, ENDARTERECTOMY OF SFA AND PF ARTERIES, LEFT EXTERNAL ILIAC TO COMMON FEMORAL INTERPOSITION GRAFT;  Surgeon: Damir Roberts MD;  Location:  OR     COLONOSCOPY       COLONOSCOPY N/A 8/8/2019    Procedure: COLONOSCOPY;  Surgeon: Pavan Arguello MD;  Location:  GI     ENDARTERECTOMY FEMORAL  6/12/2012    Procedure: ENDARTERECTOMY FEMORAL;;  Surgeon: Damir Roberts MD;  Location:  OR     ESOPHAGOSCOPY, GASTROSCOPY, DUODENOSCOPY (EGD), COMBINED N/A 3/22/2018    Procedure: COMBINED ESOPHAGOSCOPY, GASTROSCOPY, DUODENOSCOPY (EGD);  ESOPHAGOSCOPY, GASTROSCOPY, DUODENOSOCPY.;  Surgeon: Valeri Pruitt MD;  Location:  OR     ESOPHAGOSCOPY, GASTROSCOPY, DUODENOSCOPY (EGD), COMBINED N/A 9/29/2018    Procedure: COMBINED ESOPHAGOSCOPY, GASTROSCOPY, DUODENOSCOPY (EGD);;  Surgeon: Rosendo Shaw MD;  Location:  GI     ESOPHAGOSCOPY, GASTROSCOPY, DUODENOSCOPY (EGD), COMBINED N/A 8/2/2019    Procedure: ESOPHAGOGASTRODUODENOSCOPY (EGD);  Surgeon: Pavan Arguello MD;  Location:  GI     ESOPHAGOSCOPY, GASTROSCOPY, DUODENOSCOPY (EGD), COMBINED N/A 9/25/2019    Procedure: ESOPHAGOGASTRODUODENOSCOPY (EGD);   Surgeon: Pavan Arguello MD;  Location:  GI     HERNIA REPAIR  2017    Abdominal     LAMINECTOMY, FUSION LUMBAR THREE+ LEVEL, COMBINED N/A 9/22/2014    Procedure: COMBINED LAMINECTOMY, FUSION LUMBAR THREE+ LEVEL;  Surgeon: Seabstien Pruitt MD;  Location:  OR     TONSILLECTOMY & ADENOIDECTOMY         Social History   I have reviewed this patient's social history and updated it with pertinent information if needed.  Social History     Tobacco Use     Smoking status: Current Every Day Smoker     Packs/day: 1.00     Types: Cigarettes     Smokeless tobacco: Never Used     Tobacco comment: Chantix caused nightmares   Substance Use Topics     Alcohol use: Yes     Comment: Last drank Yesterday drinks 750 ml of vodka daily     Drug use: No       Family History   I have reviewed this patient's family history and updated it with pertinent information if needed.   Family History   Problem Relation Age of Onset     Mental Illness Son      Coronary Artery Disease Early Onset Mother      Substance Abuse Father      Lung Cancer Father      Substance Abuse Brother      Unknown/Adopted No family hx of      Depression No family hx of      Anxiety Disorder No family hx of      Schizophrenia No family hx of      Bipolar Disorder No family hx of      Suicide No family hx of      Dementia No family hx of      [place] Disease No family hx of      Parkinsonism No family hx of      Autism Spectrum Disorder No family hx of      Intellectual Disability (Mental Retardation) No family hx of      Prior to Admission Medications   Prior to Admission Medications   Prescriptions Last Dose Informant Patient Reported? Taking?   DULoxetine (CYMBALTA) 60 MG capsule More than a month at Unknown time Self Yes No   Sig: Take 60 mg by mouth daily    QUEtiapine (SEROQUEL) 50 MG tablet More than a month at Unknown time Self Yes No   Sig: Take 50 mg by mouth nightly as needed   albuterol (PROAIR HFA/PROVENTIL HFA/VENTOLIN HFA) 108 (90 BASE) MCG/ACT  Inhaler More than a month at Unknown time Self Yes No   Sig: Inhale 2 puffs into the lungs every 6 hours as needed    fluticasone-vilanterol (BREO ELLIPTA) 200-25 MCG/INH inhaler More than a month at Unknown time Self Yes No   Sig: Inhale 1 puff into the lungs daily    folic acid (FOLVITE) 1 MG tablet More than a month at Unknown time Self No No   Sig: Take 1 tablet (1 mg) by mouth daily   furosemide (LASIX) 40 MG tablet Past Week at Unknown time Self Yes Yes   Sig: Take 40 mg by mouth daily   hydrOXYzine (ATARAX) 50 MG tablet More than a month at Unknown time Self No No   Sig: Take 1 tablet (50 mg) by mouth every 6 hours as needed for anxiety Stopped at last discharge, but reports taking as needed.   lactulose 20 GM/30ML SOLN More than a month at Unknown time Self Yes No   Sig: Take 30 mLs by mouth 2 times daily   mirtazapine (REMERON) 15 MG tablet More than a month at Unknown time Self No No   Sig: Take 1 tablet (15 mg) by mouth At Bedtime   multivitamin, therapeutic with minerals (THERA-VIT-M) TABS tablet More than a month at Unknown time Self No No   Sig: Take 1 tablet by mouth daily   order for DME DME at DME Self No No   Sig: Equipment being ordered: Walker Wheels () and Walker ()  Treatment Diagnosis: difficulty walking   spironolactone (ALDACTONE) 50 MG tablet More than a month at Unknown time Self No No   Sig: Take 1 tablet (50 mg) by mouth 2 times daily   traZODone (DESYREL) 100 MG tablet More than a month at Unknown time Self No No   Sig: Take 2 tablets (200 mg) by mouth At Bedtime      Facility-Administered Medications: None     Allergies   Allergies   Allergen Reactions     Amlodipine Swelling     Lisinopril      Other reaction(s): Angioedema  Mouth and tongue swelling   Mouth and tongue swelling          Physical Exam   Vital Signs: Temp: 98.2  F (36.8  C) Temp src: Oral BP: (!) 149/71 Pulse: 96   Resp: 18 SpO2: 97 % O2 Device: None (Room air)    Weight: 0 lbs 0 oz    Constitutional: awake,  alert, cooperative, no apparent distress.   Eyes: Lids and lashes normal, pupils equal, round and reactive to light   ENT: Normocephalic, without obvious abnormality, atraumatic, sinuses nontender on palpation   Hematologic / Lymphatic: no cervical lymphadenopathy   Respiratory: CTABL   Cardiovascular: RRR with no m/r/g   GI: distended, tender to palpation diffusely, no g/r  Skin: normal skin color, texture, turgor   Musculoskeletal: There is no redness, warmth, or swelling of the joints. Full range of motion noted.   Neurologic: Awake, alert, oriented to name, place and time. Cranial nerves II-XII are grossly intact. Motor is 5 out of 5 bilaterally. Sensory is intact.   Neuropsychiatric: normal mood and affect      Data   Data reviewed today: I reviewed all medications, new labs and imaging results over the last 24 hours. I personally reviewed no images or EKG's today.    Most Recent 3 CBC's:  Recent Labs   Lab Test 03/07/20  1235 02/26/20  0821 02/08/20  0735   WBC 10.3 5.2 3.3*   HGB 9.4* 10.0* 8.8*   MCV 92 93 96    184 67*     Most Recent 3 BMP's:  Recent Labs   Lab Test 03/07/20  1235 02/26/20  0821 02/17/20  0904    126* 132*   POTASSIUM 4.2 4.9 4.2   CHLORIDE 103 95 102   CO2 17* 27 23   BUN 34* 25 23   CR 1.20 1.24 1.38*   ANIONGAP 15* 4 7   KEV 8.4* 9.1 9.0   GLC 70 141* 141*     Most Recent 2 LFT's:  Recent Labs   Lab Test 02/07/20  0758 02/04/20  0800   AST 37 53*   ALT 19 22   ALKPHOS 194* 234*   BILITOTAL 1.0 2.5*     Most Recent 3 INR's:  Recent Labs   Lab Test 03/07/20  1235 02/24/20  1439 02/07/20  0758   INR 1.24* 1.24* 1.36*     No results found for this or any previous visit (from the past 24 hour(s)).

## 2020-03-07 NOTE — ED PROVIDER NOTES
History     Chief Complaint:  Rectal Problem    The history is provided by the EMS personnel and the patient.      Jim Richard is a 65 year old male who presents via EMS for a rectal problem. The patient called EMS today because he was incontinent of stool. EMS reports that he was incontinent of black tarry stool and that there was black stool all over his house. He denies being on blood thinners. He is a daily drinker of around 750mL of vodka daily. EMS reports an alcohol level of 0.260. The patient denies any pain. He does have a history of alcoholic cirrhosis of liver with ascitics and states that his abdomen is not to distended right now.       Allergies:  Amlodipine  Lisinopril     Medications:    Albuterol  Antabuse  Cymbalta  Breo ellipta  Folvite  Atarax  Lactulose  Remeron  Protonix  Inderal  Seroquel  Aldactone  Flomax  Desyrel    Past Medical History:    Alcoholism   Anxiety   Coronary artery disease   Depression   Esophageal varices with bleeding   Heart attack    Hepatomegaly   Hyperlipidemia   Hypertension   JANIS on CPAP   Peripheral vascular disease     SDH (subdural hematoma)    Spinal stenosis  Depression  Suicidal ideation  COPD (chronic obstructive pulmonary disease)   Smoker  Chronic low back pain  Melena  Anemia, iron deficiency  Chronic back pain greater than 3 months duration  Tremor  Alcoholic cirrhosis of liver with ascites     Past Surgical History:    Appendectomy  Bypass graft femoropopliteal  Endarterectomy femoral  Hernia repair  Laminectomy, fusion lumbar three+ level  Tonsil and adenoidectomy    Family History:    Mental illness  C.A.D.  Substance abuse  Cancer    Social History:  Patient is   Tobacco Use: Current smoker  Alcohol Use: Yes  PCP: FERNANDA BRANTLEY     Review of Systems   Constitutional: Negative for fever.   Gastrointestinal: Positive for blood in stool.   All other systems reviewed and are negative.    Physical Exam   First Vitals:  Patient Vitals for  the past 24 hrs:   BP Temp Temp src Pulse Resp SpO2   03/07/20 1230 (!) 149/71 98.2  F (36.8  C) Oral 96 18 97 %     Physical Exam  Physical Exam   General:  Sitting on bed.   HENT:  No obvious trauma to head  Right Ear:  External ear normal.   Left Ear:  External ear normal.   Nose:  Nose normal.   Eyes:  Conjunctivae and EOM are normal. Pupils are equal, round, and reactive.   Neck: Normal range of motion. Neck supple. No tracheal deviation present.   CV:  Normal heart sounds. No murmur heard.  Pulm/Chest: Effort normal and breath sounds normal.   Abd: Soft. Abdomen distended, no significant tenderness. There is no rigidity, no rebound and no guarding.   Rectal: normal rectal tone, black stool in rectal vault.  M/S: Normal range of motion.   Neuro: Alert. GCS 15.  Skin: Skin is warm and dry. No rash noted. Not diaphoretic.   Psych: Normal mood and affect. Behavior is normal.       Emergency Department Course     Laboratory:  CBC:  WBC 10.3, HGB 9.4 low,   BMP: Carbon Dioxide 17 low, Anion Gap 15 high, BUN 34 high, Calcium 8.4 low, o/w WNL. (Creatinine 1.20)  Alcohol Ethyl: 0.29  INR: 1.24  Occult Blood Stool: Positive    Interventions:  1311: NS 1L IV  1311: Ativan 1mg IV  Banana Bag (thiamine 100 mg, folic acid 1 mg, MVI)  Pantoprazole 40 mg IV    Emergency Department Course:  12:38 PM Nursing notes and vitals reviewed.  I performed an exam of the patient as documented above.     Findings and plan explained to the patient who consents to admission.   1:16 PM I discussed the patient with Dr. Murcia of the hospitalist service, who will admit the patient to a medical bed for further monitoring, evaluation, and treatment.    Impression & Plan      Medical Decision Making:  Jim Richard is a very pleasant 65 year old malewho presents to the ER with blood in the stool. Possible sources of bleeding include upper GI sources (ulcer, gastritis, AVM, tumor, etc) vs lower GI sources (tumor, diverticulosis, AVM,  meckels diverticulum, etc), as well as colitis, aortoenteric fistula, hemorrhoids, fissures, ischemic colitis, infectious colitis (i.e. bacteral causes such as salmonella, shigella, E. Coli, camylobacter).    Work-up here in the emergency department is consistent with GI bleeding, although the exact source of bleeding remains unclear at this time.  I am most suspicious for upper GI.  Risk factors for bleeding include alcoholism.  Patient is not on blood thinning medications which would complicate their current presentation.   Hemodynamically, the patient has remained in stable condition, and for that reason, patient has not required emergent blood transfusion or emergent consultation by gastroenterology for endoscopy or colonoscopy. Given the degree of bleeding, and risk factors identified above, the patient will be admitted to the hospital for further treatment and cares. Currently, the patient is hemodynamically stable.  The patient has abdominal distention consistent with his prior ascites.  The patient may benefit from a paracentesis during the hospitalization.  At this time he is afebrile and has no leukocytosis or any increased abdominal pain and spontaneous bacterial peritonitis is unlikely.  A blood consent form was signed in case he requires blood during the hospitalization.  The patient was provided a banana bag for his chronic alcohol use and Ativan as he reported feeling slightly tremulous, although his blood alcohol is still 0.29.  The case was discussed with the accepting hospitalist, Dr. Murcia, who wass in agreement with the plan of care and have accepted the patient to their service.  Patient was updated regarding the proposed plan of care and was agreeable.    Diagnosis:    ICD-10-CM    1. Gastrointestinal hemorrhage, unspecified gastrointestinal hemorrhage type K92.2 INR     Basic metabolic panel     Alcohol ethyl     Occult blood stool   2. Abdominal distension R14.0    3. Other ascites R18.8         Disposition:  Admitted to the hospitalist    I, Bradley Aasen, am serving as a scribe on 3/7/2020 at 12:38 PM to personally document services performed by Rufino Sequeira DO based on my observations and the provider's statements to me.          Rufino Sequeira DO  03/07/20 132

## 2020-03-07 NOTE — ED TRIAGE NOTES
Pt brought in by medics aftedrf he called 911 because he was incontinent of stool. Medics noted trail of black stool. Pt drinks 750mL of vodka daily. States has had black stool since he got out of the hospital. PBT was 260 per medics.

## 2020-03-07 NOTE — ED NOTES
[facility]  ED Nurse Handoff Report    ED Chief complaint: Rectal Problem      ED Diagnosis:   Final diagnoses:   Gastrointestinal hemorrhage, unspecified gastrointestinal hemorrhage type   Abdominal distension   Other ascites       Code Status: Not discussed    Allergies:   Allergies   Allergen Reactions     Amlodipine Swelling     Lisinopril      Other reaction(s): Angioedema  Mouth and tongue swelling   Mouth and tongue swelling          Patient Story: Pt called 911 d/t incontinence of black stools today, hx of similar. Drinks 750mL of vodka daily. Pt is cooperative. No pain. Last drink was this morning. ETOH 0.29.   Focused Assessment:  Abdomen distended. No N/V, no tremors noted.    Treatments and/or interventions provided: Ativan, 1 L of NS.  Patient's response to treatments and/or interventions: No change    To be done/followed up on inpatient unit:  None    Does this patient have any cognitive concerns?: None, pt appears baseline    Activity level - Baseline/Home:  Independent  Activity Level - Current:   Independent    Patient's Preferred language: English   Needed?: No    Isolation: None  Infection: Not Applicable  Bariatric?: No    Vital Signs:   Vitals:    03/07/20 1230   BP: (!) 149/71   Pulse: 96   Resp: 18   Temp: 98.2  F (36.8  C)   TempSrc: Oral   SpO2: 97%       Cardiac Rhythm:Cardiac Rhythm: Normal sinus rhythm    Was the PSS-3 completed:   Yes  What interventions are required if any?  None             Family Comments: None  OBS brochure/video discussed/provided to patient/family: N/A              Name of person given brochure if not patient: NA              Relationship to patient: NA    For the majority of the shift this patient's behavior was Green.   Behavioral interventions performed were None.    ED NURSE PHONE NUMBER: [phone]

## 2020-03-07 NOTE — ED NOTES
Bed: ED10  Expected date: 3/7/20  Expected time: 12:12 PM  Means of arrival: Ambulance  Comments:  428 65m GI bleed, abd pain, ETOH  ETA 1217

## 2020-03-07 NOTE — PLAN OF CARE
Pt is a new ER admit. A&OX4, VSS on RA except for hypertensive. Not scoring on CIWA. Very tremulous. Up SBA, PIV bleeding, Banna bag bolus on hold until new IV secured. On clear liquid diet. GI consult pending. Hgb stable, will be checking Q 6hrs. Abd distended with ascitis.Continue to monitor.

## 2020-03-08 LAB
ALBUMIN SERPL-MCNC: 2.7 G/DL (ref 3.4–5)
ALP SERPL-CCNC: 194 U/L (ref 40–150)
ALT SERPL W P-5'-P-CCNC: 78 U/L (ref 0–70)
ANION GAP SERPL CALCULATED.3IONS-SCNC: 9 MMOL/L (ref 3–14)
AST SERPL W P-5'-P-CCNC: 348 U/L (ref 0–45)
BILIRUB DIRECT SERPL-MCNC: 0.9 MG/DL (ref 0–0.2)
BILIRUB SERPL-MCNC: 2.1 MG/DL (ref 0.2–1.3)
BUN SERPL-MCNC: 30 MG/DL (ref 7–30)
CALCIUM SERPL-MCNC: 8.2 MG/DL (ref 8.5–10.1)
CHLORIDE SERPL-SCNC: 102 MMOL/L (ref 94–109)
CO2 SERPL-SCNC: 20 MMOL/L (ref 20–32)
CREAT SERPL-MCNC: 1.26 MG/DL (ref 0.66–1.25)
ERYTHROCYTE [DISTWIDTH] IN BLOOD BY AUTOMATED COUNT: 14.1 % (ref 10–15)
GFR SERPL CREATININE-BSD FRML MDRD: 59 ML/MIN/{1.73_M2}
GLUCOSE SERPL-MCNC: 92 MG/DL (ref 70–99)
HCT VFR BLD AUTO: 21.8 % (ref 40–53)
HGB BLD-MCNC: 7.4 G/DL (ref 13.3–17.7)
HGB BLD-MCNC: 7.6 G/DL (ref 13.3–17.7)
HGB BLD-MCNC: 7.9 G/DL (ref 13.3–17.7)
MCH RBC QN AUTO: 31 PG (ref 26.5–33)
MCHC RBC AUTO-ENTMCNC: 33.9 G/DL (ref 31.5–36.5)
MCV RBC AUTO: 91 FL (ref 78–100)
PLATELET # BLD AUTO: 86 10E9/L (ref 150–450)
POTASSIUM SERPL-SCNC: 3.7 MMOL/L (ref 3.4–5.3)
PROT SERPL-MCNC: 6.3 G/DL (ref 6.8–8.8)
RBC # BLD AUTO: 2.39 10E12/L (ref 4.4–5.9)
SODIUM SERPL-SCNC: 131 MMOL/L (ref 133–144)
UPPER GI ENDOSCOPY: NORMAL
WBC # BLD AUTO: 4.9 10E9/L (ref 4–11)

## 2020-03-08 PROCEDURE — 12000000 ZZH R&B MED SURG/OB

## 2020-03-08 PROCEDURE — 25000128 H RX IP 250 OP 636: Performed by: STUDENT IN AN ORGANIZED HEALTH CARE EDUCATION/TRAINING PROGRAM

## 2020-03-08 PROCEDURE — 85018 HEMOGLOBIN: CPT | Performed by: INTERNAL MEDICINE

## 2020-03-08 PROCEDURE — 25000125 ZZHC RX 250: Performed by: INTERNAL MEDICINE

## 2020-03-08 PROCEDURE — 43235 EGD DIAGNOSTIC BRUSH WASH: CPT | Performed by: INTERNAL MEDICINE

## 2020-03-08 PROCEDURE — 40000104 ZZH STATISTIC MODERATE SEDATION < 10 MIN: Performed by: INTERNAL MEDICINE

## 2020-03-08 PROCEDURE — 85027 COMPLETE CBC AUTOMATED: CPT | Performed by: STUDENT IN AN ORGANIZED HEALTH CARE EDUCATION/TRAINING PROGRAM

## 2020-03-08 PROCEDURE — 25000128 H RX IP 250 OP 636: Performed by: INTERNAL MEDICINE

## 2020-03-08 PROCEDURE — 36415 COLL VENOUS BLD VENIPUNCTURE: CPT | Performed by: INTERNAL MEDICINE

## 2020-03-08 PROCEDURE — 80076 HEPATIC FUNCTION PANEL: CPT | Performed by: STUDENT IN AN ORGANIZED HEALTH CARE EDUCATION/TRAINING PROGRAM

## 2020-03-08 PROCEDURE — 25000132 ZZH RX MED GY IP 250 OP 250 PS 637: Mod: GY | Performed by: INTERNAL MEDICINE

## 2020-03-08 PROCEDURE — 99233 SBSQ HOSP IP/OBS HIGH 50: CPT | Performed by: INTERNAL MEDICINE

## 2020-03-08 PROCEDURE — 80048 BASIC METABOLIC PNL TOTAL CA: CPT | Performed by: STUDENT IN AN ORGANIZED HEALTH CARE EDUCATION/TRAINING PROGRAM

## 2020-03-08 PROCEDURE — 25000132 ZZH RX MED GY IP 250 OP 250 PS 637: Mod: GY | Performed by: STUDENT IN AN ORGANIZED HEALTH CARE EDUCATION/TRAINING PROGRAM

## 2020-03-08 PROCEDURE — 25000125 ZZHC RX 250: Performed by: STUDENT IN AN ORGANIZED HEALTH CARE EDUCATION/TRAINING PROGRAM

## 2020-03-08 PROCEDURE — 25800030 ZZH RX IP 258 OP 636: Performed by: STUDENT IN AN ORGANIZED HEALTH CARE EDUCATION/TRAINING PROGRAM

## 2020-03-08 PROCEDURE — C9113 INJ PANTOPRAZOLE SODIUM, VIA: HCPCS | Performed by: STUDENT IN AN ORGANIZED HEALTH CARE EDUCATION/TRAINING PROGRAM

## 2020-03-08 PROCEDURE — 36415 COLL VENOUS BLD VENIPUNCTURE: CPT | Performed by: STUDENT IN AN ORGANIZED HEALTH CARE EDUCATION/TRAINING PROGRAM

## 2020-03-08 PROCEDURE — 99222 1ST HOSP IP/OBS MODERATE 55: CPT | Performed by: PSYCHIATRY & NEUROLOGY

## 2020-03-08 PROCEDURE — 0DJ08ZZ INSPECTION OF UPPER INTESTINAL TRACT, VIA NATURAL OR ARTIFICIAL OPENING ENDOSCOPIC: ICD-10-PCS | Performed by: INTERNAL MEDICINE

## 2020-03-08 RX ORDER — FENTANYL CITRATE 50 UG/ML
INJECTION, SOLUTION INTRAMUSCULAR; INTRAVENOUS PRN
Status: DISCONTINUED | OUTPATIENT
Start: 2020-03-08 | End: 2020-03-08 | Stop reason: HOSPADM

## 2020-03-08 RX ORDER — NALOXONE HYDROCHLORIDE 0.4 MG/ML
.1-.4 INJECTION, SOLUTION INTRAMUSCULAR; INTRAVENOUS; SUBCUTANEOUS
Status: DISCONTINUED | OUTPATIENT
Start: 2020-03-08 | End: 2020-03-08

## 2020-03-08 RX ORDER — MIRTAZAPINE 15 MG/1
15 TABLET, FILM COATED ORAL AT BEDTIME
Status: DISCONTINUED | OUTPATIENT
Start: 2020-03-08 | End: 2020-04-09 | Stop reason: HOSPADM

## 2020-03-08 RX ORDER — FLUMAZENIL 0.1 MG/ML
0.2 INJECTION, SOLUTION INTRAVENOUS
Status: ACTIVE | OUTPATIENT
Start: 2020-03-08 | End: 2020-03-08

## 2020-03-08 RX ORDER — LIDOCAINE 40 MG/G
CREAM TOPICAL
Status: CANCELLED | OUTPATIENT
Start: 2020-03-08

## 2020-03-08 RX ORDER — CEFTRIAXONE 1 G/1
1 INJECTION, POWDER, FOR SOLUTION INTRAMUSCULAR; INTRAVENOUS EVERY 24 HOURS
Status: DISCONTINUED | OUTPATIENT
Start: 2020-03-08 | End: 2020-03-09

## 2020-03-08 RX ADMIN — ONDANSETRON 4 MG: 2 INJECTION INTRAMUSCULAR; INTRAVENOUS at 08:44

## 2020-03-08 RX ADMIN — DULOXETINE 60 MG: 60 CAPSULE, DELAYED RELEASE ORAL at 08:39

## 2020-03-08 RX ADMIN — PANTOPRAZOLE SODIUM 40 MG: 40 INJECTION, POWDER, FOR SOLUTION INTRAVENOUS at 08:39

## 2020-03-08 RX ADMIN — TRAZODONE HYDROCHLORIDE 50 MG: 50 TABLET ORAL at 21:22

## 2020-03-08 RX ADMIN — POLYETHYLENE GLYCOL 3350, SODIUM SULFATE ANHYDROUS, SODIUM BICARBONATE, SODIUM CHLORIDE, POTASSIUM CHLORIDE 4000 ML: 236; 22.74; 6.74; 5.86; 2.97 POWDER, FOR SOLUTION ORAL at 17:54

## 2020-03-08 RX ADMIN — LORAZEPAM 1 MG: 1 TABLET ORAL at 08:48

## 2020-03-08 RX ADMIN — PANTOPRAZOLE SODIUM 40 MG: 40 INJECTION, POWDER, FOR SOLUTION INTRAVENOUS at 21:22

## 2020-03-08 RX ADMIN — MICONAZOLE NITRATE: 20 POWDER TOPICAL at 18:59

## 2020-03-08 RX ADMIN — FOLIC ACID: 5 INJECTION, SOLUTION INTRAMUSCULAR; INTRAVENOUS; SUBCUTANEOUS at 13:42

## 2020-03-08 RX ADMIN — MICONAZOLE NITRATE: 20 POWDER TOPICAL at 17:54

## 2020-03-08 RX ADMIN — LORAZEPAM 2 MG: 1 TABLET ORAL at 01:05

## 2020-03-08 RX ADMIN — CEFTRIAXONE 1 G: 1 INJECTION, POWDER, FOR SOLUTION INTRAMUSCULAR; INTRAVENOUS at 09:21

## 2020-03-08 RX ADMIN — MIRTAZAPINE 15 MG: 15 TABLET, FILM COATED ORAL at 21:20

## 2020-03-08 RX ADMIN — SODIUM CHLORIDE, POTASSIUM CHLORIDE, SODIUM LACTATE AND CALCIUM CHLORIDE: 600; 310; 30; 20 INJECTION, SOLUTION INTRAVENOUS at 04:10

## 2020-03-08 ASSESSMENT — ACTIVITIES OF DAILY LIVING (ADL)
ADLS_ACUITY_SCORE: 17
ADLS_ACUITY_SCORE: 16
ADLS_ACUITY_SCORE: 17
ADLS_ACUITY_SCORE: 17
ADLS_ACUITY_SCORE: 15
ADLS_ACUITY_SCORE: 17

## 2020-03-08 NOTE — CONSULTS
3/8/20 CD consult acknowledged. Patient was seen by SYLVIE Robbins on 2/13/20 who completed a CD assessment update. SYLVIE Robbins will follow up with unit  on 3/9/20 to discuss patient and CD consult.

## 2020-03-08 NOTE — PLAN OF CARE
A&Ox4, forgetful.  VSS, RA, ex tmax 100.1.  Patient is on CIWA protocol; PRN Ativan given for scores of 9-13 this shift.  Patient had dark stool x 1 overnight.  NPO after midnight; patient aware.  PIV infusing LR @ 75 mL/hr.  Hbg checks Q 6 hrs; midnight 7.3 (down from 8.5).  Uses the bedpan, unable to safely stand and pivot to BSC.  Plan for EGD today.  Discharge pending progress.

## 2020-03-08 NOTE — PLAN OF CARE
A&Ox4, forgetful. VSS on RA except temp max 99.8. Started on iv Rocephin. Denies chest pain or SOB. CIWA scores 6,9,4,3. Gave PO Ativan x1. Mild nausea resolved with prn Zofran.  EGD done, see results. Incontinent of urine at times. Hgb 7.6. Abdomen distended. US paracentesis tomorrow. Plan is colonoscopy tomorrow. Bowel prep started this evening. Up Ax1 with walker+GB to BSC. GI following. Seen by Psych. Discharge pending progress.

## 2020-03-08 NOTE — PROGRESS NOTES
SPIRITUAL HEALTH SERVICES Progress Note  [facility] 805-02    Visited pt per SH consult. Pt said he is very tired.  had a brief conversation with pt. Pt said that he doesn't have Rastafarian connection recently but his brothers, one son, and ex-wife has been supportive during this time.  offered a prayer and prayed for pt. Pt is tired during the conversation, [facility] will remain available for any future needs.       Abdifatah Villafuerte  Chaplain Resident

## 2020-03-08 NOTE — PROGRESS NOTES
[facility]    Medicine Progress Note - Hospitalist Service       Date of Admission:  3/7/2020  Assessment & Plan   Jim Richard is a 64 year-old male with history of alcohol dependence, alcoholic cirrhosis with history of varices, depression, anxiety, coronary artery disease, obstructive sleep apnea, peripheral vascular disease, prior subdural hematoma who presents with alcohol intoxication and melena. Admitted 3/7/2020.      Melena  Gastrointestinal bleed, unspecified site  Presents with one week hx of melena.  Baseline hemoglobin 9-10 g/dl recent, on admission Hgb 9.4 g/dl. Last EGD 09/2019 showed normal esophagus with portal hypertensive gastropathy, normal duodenum. Mildly tachycardic on admission otherwise blood pressure stable. Suspect slow upper GI bleed.  Recent Labs   Lab 03/08/20  0547 03/07/20  2354 03/07/20  1858 03/07/20  1235   HGB 7.4* 7.3* 8.5* 9.4*     - GI ([facility]) consulted, plan for EGD  - Continue pantoprazole IV BID  - Avoid anticoagulation  - Continue serial hemoglobin   - Conditional transfusion orders for Hgb < 7 g/dl entered, patient has been consented for blood      Cirrhosis with ascites  Esophageal varices  PTA lactulose 10 g TID, propranolol 10 mg BID  - Continue lactulose   - Propranolol temporarily on hold to monitor hemodynamics closely with GIB  - GI consulted as above  - Add on hepatic panel   - Ceftriaxone IV ordered for SBP prophylaxis in setting of GIB  - Hold diuretics in setting of GI bleed  - Diagnostic and therapeutic paracentesis ordered for 3/9     Depression with hx of suicidal ideation  Anxiety  Prior to admission on duloxetine 60 mg daily, quetiapine 50 mg at bedtime PRN, trazodone 200 mg at bedtime, mirtazapine 15 mg at bedtime. Poor medication compliance, he has not taken these meds for few weeks now. Psych had seen previous hospitalization. He denies ongoing SI in the ED.   - Psychiatry consulted, wonder about commitment given ongoing alcohol  abuse causing significant risks to health.   - Continue prior to admission duloxetine, mirtazapine, quetiapine PRN. Hold trazodone to limit serotonergic agents.      Alcohol intoxication  Alcohol use disorder  Drinking up to 1-1.75 L vodka daily. BARNEY on admission at 0.29. Does have hx of DT's, but no h/o withdrawal seizures.   - IV vitamins  - CIWA protocol with lorazepam  - Psychiatry consulted as above  - Chemical dependency consulted      JANIS on CPAP  Continue CPAP per home settings     COPD  Tobacco abuse  Stable  - Continue prior to admission inhalers    Diet: NPO per Anesthesia Guidelines for Procedure/Surgery Except for: Meds    DVT Prophylaxis: Pneumatic Compression Devices  Leger Catheter: not present  Code Status: DNR/DNI      Disposition Plan   Expected discharge: Pending EGD, stable hemoglobin, psychiatry assessment.  Entered: Bj Parekh MD 03/08/2020, 8:52 AM       The patient's care was discussed with the Bedside Nurse and Patient.    Bj Parekh MD  Hospitalist Service  [facility]    ______________________________________________________________________    Interval History   No acute events overnight. Patient reports ongoing black stools, last one this morning. Denies any chest pain and shortness of breath. Reports he typically gets US paracentesis every 2-3 weeks and is currently due, feels his abdomen is distended.     Data reviewed today: I reviewed all medications, new labs and imaging results over the last 24 hours. I personally reviewed no images or EKG's today.    Physical Exam   Vital Signs: Temp: 99.8  F (37.7  C) Temp src: Oral BP: 130/52 Pulse: 85 Heart Rate: 92 Resp: 18 SpO2: 92 % O2 Device: None (Room air)    Weight: 0 lbs 0 oz    Constitutional: NAD  Respiratory: Clear to auscultation bilaterally, good air movement bilaterally  Cardiovascular: RRR, no m/r/g. No peripheral edema.  GI: + ascites, soft, fullness/discomfort with palpation.    Skin/Integumen: Warm,  dry  Neuro:  Alert. Responding to questions appropriately. Mildly tremulous     Data   Recent Labs   Lab 03/08/20  0547 03/07/20  2354 03/07/20  1858 03/07/20  1235   WBC 4.9  --   --  10.3   HGB 7.4* 7.3* 8.5* 9.4*   MCV 91  --   --  92   PLT 86*  --   --  171   INR  --   --   --  1.24*   *  --   --  135   POTASSIUM 3.7  --   --  4.2   CHLORIDE 102  --   --  103   CO2 20  --   --  17*   BUN 30  --   --  34*   CR 1.26*  --   --  1.20   ANIONGAP 9  --   --  15*   KEV 8.2*  --   --  8.4*   GLC 92  --   --  70       No results found for this or any previous visit (from the past 24 hour(s)).  Medications     lactated ringers 75 mL/hr at 03/08/20 0839     - MEDICATION INSTRUCTIONS -       - MEDICATION INSTRUCTIONS -         DULoxetine  60 mg Oral Daily     fluticasone-vilanterol  1 puff Inhalation Daily     lactulose  30 mL Oral BID     pantoprazole (PROTONIX) IV  40 mg Intravenous BID     sodium chloride (PF)  3 mL Intracatheter Q8H     sodium chloride (PF)  3 mL Intracatheter Q8H     IV Fluid with vitamins   Intravenous Q24H

## 2020-03-08 NOTE — PROGRESS NOTES
[facility]  Gastroenterology Progress Note     Jim Richard MRN# [id number]   YOB: 1955 Age: 65 year old          Assessment and Plan:     Acute blood loss anemia  Patient dropped hemoglobin overnight 2 g.  Patient has been getting Ativan and his medications.  Patient is overall doing better patient is still actively drinking and patient has large ascites.  Abdomen is reasonably soft without any significant tenderness.  Patient is currently on IV fluids.  Upper GI endoscopy done this morning was consistent with mild esophagitis and moderate to severe portal gastropathy no signs of active bleeding.  Plan is to continue patient on 2 g sodium diet and schedule him for colonoscopy if negative patient will need further evaluation with video capsule endoscopy for recurrent GI bleed in the setting of portal hypertension.  Patient is also need to be evaluated with paracentesis for SBP.  Patient is already on antibiotics.  I will recommend to decrease or hold IV fluids.            Gastrointestinal hemorrhage, unspecified gastrointestinal hemorrhage type  Abdominal distension  Other ascites      Interval History:   doing well; no cp, sob, n/v/d, or abd pain.              Review of Systems:   C: NEGATIVE for fever, chills, change in weight  E/M: NEGATIVE for ear, mouth and throat problems  R: NEGATIVE for significant cough or SOB  CV: NEGATIVE for chest pain, palpitations or peripheral edema             Medications:   I have reviewed this patient's current medications    cefTRIAXone  1 g Intravenous Q24H     DULoxetine  60 mg Oral Daily     fluticasone-vilanterol  1 puff Inhalation Daily     lactulose  30 mL Oral BID     mirtazapine  15 mg Oral At Bedtime     pantoprazole (PROTONIX) IV  40 mg Intravenous BID     sodium chloride (PF)  3 mL Intracatheter Q8H     IV Fluid with vitamins   Intravenous Q24H                  Physical Exam:   Vitals were reviewed  Vital Signs with Ranges  Temp:   [98.7  F (37.1  C)-100.1  F (37.8  C)] 98.8  F (37.1  C)  Pulse:  [] 78  Heart Rate:  [] 75  Resp:  [18-25] 20  BP: (102-150)/(52-75) 134/63  SpO2:  [92 %-99 %] 95 %  I/O last 3 completed shifts:  In: 1505 [P.O.:980; I.V.:525]  Out: -   Constitutional: healthy, alert and no distress   Cardiovascular: negative, PMI normal. No lifts, heaves, or thrills. RRR. No murmurs, clicks gallops or rub  Respiratory: negative, Percussion normal. Good diaphragmatic excursion. Lungs clear  Head: Normocephalic. No masses, lesions, tenderness or abnormalities  Neck: Neck supple. No adenopathy. Thyroid symmetric, normal size,, Carotids without bruits.  Abdomen: Abdomen soft, non-tender. BS normal. No masses, organomegaly  SKIN: no suspicious lesions or rashes           Data:   I reviewed the patient's new clinical lab test results.   Recent Labs   Lab Test 03/08/20  1410 03/08/20  0547 03/07/20  2354  03/07/20  1235 02/26/20  0821 02/24/20  1439  02/07/20  0758   WBC  --  4.9  --   --  10.3 5.2  --    < > 3.4*   HGB 7.6* 7.4* 7.3*   < > 9.4* 10.0*  --    < > 8.2*   MCV  --  91  --   --  92 93  --    < > 97   PLT  --  86*  --   --  171 184  --    < > 58*   INR  --   --   --   --  1.24*  --  1.24*  --  1.36*    < > = values in this interval not displayed.     Recent Labs   Lab Test 03/08/20  0547 03/07/20  1235 02/26/20  0821   POTASSIUM 3.7 4.2 4.9   CHLORIDE 102 103 95   CO2 20 17* 27   BUN 30 34* 25   ANIONGAP 9 15* 4     Recent Labs   Lab Test 03/08/20  0547 02/07/20  0758 02/04/20  0800 02/03/20  0218  01/11/20  1730  11/13/19  1858 11/13/19  1849  02/13/19  2340  09/25/14  0510   ALBUMIN 2.7* 2.2* 2.3* 2.7*   < >  --    < > 3.0*  --    < > 3.1*   < >  --    BILITOTAL 2.1* 1.0 2.5* 1.6*   < >  --    < > 1.0  --    < > 0.4   < >  --    ALT 78* 19 22 26   < >  --    < > 24  --    < > 41   < >  --    * 37 53* 61*   < >  --    < > 58*  --    < > 89*   < >  --    PROTEIN  --   --   --   --   --  Negative  --   --  30*   --   --   --  Negative   LIPASE  --   --   --  369  --   --   --  499*  --   --  536*   < >  --     < > = values in this interval not displayed.       I reviewed the patient's new imaging results.    All laboratory data reviewed  All imaging studies reviewed by me.    Rosendo Shaw MD,  3/8/2020  [facility]  Office : [phone]  Cell: [phone]

## 2020-03-08 NOTE — PLAN OF CARE
A&O.  VSS. Scoring 7-12 on CIWA, PRN ativan given x3. Clear liquid diet. Abdomen very distended. Incontinent of multiple dark stools this evening. Up with standby assist per report.

## 2020-03-08 NOTE — CONSULTS
[facility] Initial Psychiatric Consult     Reason for consultation: Recurrent alcohol abuse, commitment?     Requesting Clinician: Dr. Murcia    Identifying data  Mr. Jim Richard is a 65 year old gentlmean with history of alcohol use disorder, severe, Major depressive disorder, recurrent, moderate, well known to the psychiatry service at [facility]. He has had four admissions at [facility] since January, 2020     History of Present Illness   Mr. Richard was admitted through the emergency department where he had presented after contacting EMS himself due to fecal incontinence. He is admitted to [facility] and will undergo EGD today. During his last admission he was going to discharge directly to [facility] but it appears at the last moment he decided against it and was discharged. Psychiatry has been consulted to evaluate his alcohol use disorder and evaluate for possible committement.    On interview he reports he continues to struggle with his alcohol use. He acknowledges this remains a big problem and something he would like help with staying sober. He is concerned because the only place he believes he could undergo treatment due to his age is [facility] but states he has had a bad experience there previously and thus would not want to go back. He is open to other ideas as well. He reports he also has depression, rated moderate severity, causing low motivation. He reports he never has hopelessness or suicidal thoughts. He denies much impact on sleep or appetite recently, other than alcohol use. He reports moderate anxiety as well, described as both physical restlessness and generalized worry. He is on a lot of psychiatric medications and claims they all are helpful, but then states he was unable to pick them up from the pharmacy. He denies any history of bipolar disorderor, psychosis.    Past Psychiatric History     Includes a diagnosis of major depressive disorder with no  previous inpatient hospitalizations, one remote suicide attempt in the distant past. He does not have current treatment providers     Past Chemical Dependency History  Per Dr. Davis from note 2/3/20: Notable for intractable alcohol dependence with progressive medical problems.  He has esophageal varices, cirrhosis and ascites.  He has never had seizures, but has gone through significant withdrawal and appears to have declining health.       Past Medical History  Reviewed,See initial H&P    Family History       Per EMR        Family History   Problem Relation Age of Onset     Mental Illness Son       Coronary Artery Disease Early Onset Mother       Substance Abuse Father       Lung Cancer Father       Substance Abuse Brother       Unknown/Adopted No family hx of       Depression No family hx of       Anxiety Disorder No family hx of       Schizophrenia No family hx of       Bipolar Disorder No family hx of       Suicide No family hx of       Dementia No family hx of       [place] Disease No family hx of       Parkinsonism No family hx of       Autism Spectrum Disorder No family hx of       Intellectual Disability (Mental Retardation)         Social History  Single, lives alone. Likely little social support. Describes he was unable to get his medications from the pharmacy.      Review of systems:   10 point Review of Systems completed by Dr Eller, and is negative other than noted in the HPI and he states he is having some mild back pain.  BP/P/temp, weight reviewed.     Medications:       cefTRIAXone  1 g Intravenous Q24H     DULoxetine  60 mg Oral Daily     fluticasone-vilanterol  1 puff Inhalation Daily     lactulose  30 mL Oral BID     mirtazapine  15 mg Oral At Bedtime     pantoprazole (PROTONIX) IV  40 mg Intravenous BID     sodium chloride (PF)  3 mL Intracatheter Q8H     sodium chloride (PF)  3 mL Intracatheter Q8H     IV Fluid with vitamins   Intravenous Q24H     acetaminophen, lidocaine 4%, lidocaine  4%, lidocaine (buffered or not buffered), lidocaine (buffered or not buffered), LORazepam **OR** LORazepam, - MEDICATION INSTRUCTIONS -, - MEDICATION INSTRUCTIONS -, melatonin, naloxone, ondansetron **OR** ondansetron, prochlorperazine **OR** prochlorperazine **OR** prochlorperazine, QUEtiapine, sodium chloride (PF), sodium chloride (PF), traZODone    Prior to Admission Medications   Prescriptions Last Dose Informant Patient Reported? Taking?   DULoxetine (CYMBALTA) 60 MG capsule More than a month at Unknown time Self Yes No   Sig: Take 60 mg by mouth daily    QUEtiapine (SEROQUEL) 50 MG tablet More than a month at Unknown time Self Yes No   Sig: Take 50 mg by mouth nightly as needed   albuterol (PROAIR HFA/PROVENTIL HFA/VENTOLIN HFA) 108 (90 BASE) MCG/ACT Inhaler More than a month at Unknown time Self Yes No   Sig: Inhale 2 puffs into the lungs every 6 hours as needed    fluticasone-vilanterol (BREO ELLIPTA) 200-25 MCG/INH inhaler More than a month at Unknown time Self Yes No   Sig: Inhale 1 puff into the lungs daily    folic acid (FOLVITE) 1 MG tablet More than a month at Unknown time Self No No   Sig: Take 1 tablet (1 mg) by mouth daily   furosemide (LASIX) 40 MG tablet Past Week at Unknown time Self Yes Yes   Sig: Take 40 mg by mouth daily   hydrOXYzine (ATARAX) 50 MG tablet More than a month at Unknown time Self No No   Sig: Take 1 tablet (50 mg) by mouth every 6 hours as needed for anxiety Stopped at last discharge, but reports taking as needed.   lactulose 20 GM/30ML SOLN More than a month at Unknown time Self Yes No   Sig: Take 30 mLs by mouth 2 times daily   mirtazapine (REMERON) 15 MG tablet More than a month at Unknown time Self No No   Sig: Take 1 tablet (15 mg) by mouth At Bedtime   multivitamin, therapeutic with minerals (THERA-VIT-M) TABS tablet More than a month at Unknown time Self No No   Sig: Take 1 tablet by mouth daily   order for DME DME at DME Self No No   Sig: Equipment being ordered: Walker  Wheels () and Walker ()  Treatment Diagnosis: difficulty walking   spironolactone (ALDACTONE) 50 MG tablet More than a month at Unknown time Self No No   Sig: Take 1 tablet (50 mg) by mouth 2 times daily   traZODone (DESYREL) 100 MG tablet More than a month at Unknown time Self No No   Sig: Take 2 tablets (200 mg) by mouth At Bedtime      Facility-Administered          Mental Status Examination:     Appearance:  awake, alert  Eye Contact:  good  Speech:  clear, coherent  Use of Language:Appropriate  Psychomotor Behavior:  no evidence of tardive dyskinesia, dystonia, or tics  Mood:  depressed  Affect:  mood congruent and intensity is blunted  Thought Process:  logical, linear and goal oriented no loose associations  Thought Content:  no evidence of suicidal ideation or homicidal ideation  Oriented to:  time, person, and place  Attention Span and Concentration:  intact  Recent and Remote Memory:  intact  Fund of Knowledge: appropriate  Muscle Strength and Tone: normal  Coordination, Station and Gait: Normal  Insight:  fair  Judgment:  limited        Labs/vitals:     Recent Results (from the past 24 hour(s))   CBC with platelets differential    Collection Time: 03/07/20 12:35 PM   Result Value Ref Range    WBC 10.3 4.0 - 11.0 10e9/L    RBC Count 3.05 (L) 4.4 - 5.9 10e12/L    Hemoglobin 9.4 (L) 13.3 - 17.7 g/dL    Hematocrit 28.0 (L) 40.0 - 53.0 %    MCV 92 78 - 100 fl    MCH 30.8 26.5 - 33.0 pg    MCHC 33.6 31.5 - 36.5 g/dL    RDW 14.3 10.0 - 15.0 %    Platelet Count 171 150 - 450 10e9/L    Diff Method Automated Method     % Neutrophils 91.9 %    % Lymphocytes 4.6 %    % Monocytes 2.8 %    % Eosinophils 0.2 %    % Basophils 0.4 %    % Immature Granulocytes 0.1 %    Nucleated RBCs 0 0 /100    Absolute Neutrophil 9.5 (H) 1.6 - 8.3 10e9/L    Absolute Lymphocytes 0.5 (L) 0.8 - 5.3 10e9/L    Absolute Monocytes 0.3 0.0 - 1.3 10e9/L    Absolute Eosinophils 0.0 0.0 - 0.7 10e9/L    Absolute Basophils 0.0 0.0 - 0.2  10e9/L    Abs Immature Granulocytes 0.0 0 - 0.4 10e9/L    Absolute Nucleated RBC 0.0    INR    Collection Time: 03/07/20 12:35 PM   Result Value Ref Range    INR 1.24 (H) 0.86 - 1.14   Basic metabolic panel    Collection Time: 03/07/20 12:35 PM   Result Value Ref Range    Sodium 135 133 - 144 mmol/L    Potassium 4.2 3.4 - 5.3 mmol/L    Chloride 103 94 - 109 mmol/L    Carbon Dioxide 17 (L) 20 - 32 mmol/L    Anion Gap 15 (H) 3 - 14 mmol/L    Glucose 70 70 - 99 mg/dL    Urea Nitrogen 34 (H) 7 - 30 mg/dL    Creatinine 1.20 0.66 - 1.25 mg/dL    GFR Estimate 63 >60 mL/min/[1.73_m2]    GFR Estimate If Black 73 >60 mL/min/[1.73_m2]    Calcium 8.4 (L) 8.5 - 10.1 mg/dL   Alcohol ethyl    Collection Time: 03/07/20 12:35 PM   Result Value Ref Range    Ethanol g/dL 0.29 (H) <0.01 g/dL   Occult blood stool    Collection Time: 03/07/20 12:56 PM   Result Value Ref Range    Occult Blood Positive (A) NEG^Negative   Hemoglobin (Every 6 Hrs)    Collection Time: 03/07/20  6:58 PM   Result Value Ref Range    Hemoglobin 8.5 (L) 13.3 - 17.7 g/dL   Hemoglobin (Every 6 Hrs)    Collection Time: 03/07/20 11:54 PM   Result Value Ref Range    Hemoglobin 7.3 (L) 13.3 - 17.7 g/dL   Basic metabolic panel    Collection Time: 03/08/20  5:47 AM   Result Value Ref Range    Sodium 131 (L) 133 - 144 mmol/L    Potassium 3.7 3.4 - 5.3 mmol/L    Chloride 102 94 - 109 mmol/L    Carbon Dioxide 20 20 - 32 mmol/L    Anion Gap 9 3 - 14 mmol/L    Glucose 92 70 - 99 mg/dL    Urea Nitrogen 30 7 - 30 mg/dL    Creatinine 1.26 (H) 0.66 - 1.25 mg/dL    GFR Estimate 59 (L) >60 mL/min/[1.73_m2]    GFR Estimate If Black 68 >60 mL/min/[1.73_m2]    Calcium 8.2 (L) 8.5 - 10.1 mg/dL   CBC with platelets    Collection Time: 03/08/20  5:47 AM   Result Value Ref Range    WBC 4.9 4.0 - 11.0 10e9/L    RBC Count 2.39 (L) 4.4 - 5.9 10e12/L    Hemoglobin 7.4 (L) 13.3 - 17.7 g/dL    Hematocrit 21.8 (L) 40.0 - 53.0 %    MCV 91 78 - 100 fl    MCH 31.0 26.5 - 33.0 pg    MCHC 33.9 31.5 -  36.5 g/dL    RDW 14.1 10.0 - 15.0 %    Platelet Count 86 (L) 150 - 450 10e9/L     B/P: 130/52, T: 99.8, P: 85, R: 18    Impression:   Mr. Jim Richard is a 65 year old gentlmean with history of alcohol use disorder, severe, Major depressive disorder, recurrent, moderate, well known to the psychiatry service at [facility]. He has had four admissions at [facility] since January, 2020 and presents again for complications with ongoing alcohol use disorder. He has insight he needs to stop drinking and desires help, though our options may be limited due to age in terms of getting into treatment. He would like any type of treatment we can offer, though declines to go to [facility] as he had a bad experience there. This is a challenging case. He is unlikely to meet criteria for involuntary commitment since he states he would like help to quit drinking.       DIagnoses:   1. ALcohol use disorder, severe  2. Major depressive disorder, moderate  3. Unspecified anxiety disorder     Plan:   1. Continue duloxetine and can restart mirtazapine though initially at 7.5 mg HS given mild renal impairment currently  2. No need to restart trazodone since he would like to continue to try mirtazapine for sleep, mood, anxiety  3. Please reconsult psychiatry so we can continue to assess his motivation for accepting treatment recommendations and to continue to consider if he meets criteria for commitment         Attestation:  Patient has been seen and evaluated by me,  Michael Eller MD

## 2020-03-08 NOTE — CONSULTS
[facility]  Gastroenterology Consultation         Jim Richard  [address] [facility] [address]  65 year old male    Admission Date/Time: 3/7/2020  Primary Care Provider: Talon Elias  Referring / Attending Physician:  Dr. Murcia    We were asked to see the patient in consultation by Dr. Murcia for evaluation of GI bleed melena.      CC: GI bleed history of melena    HPI:  Jim Richard is a 65 year old male who is well-known to GI service who has been in and out of the hospital patient has been evaluated with upper GI endoscopy patient has history of heavy alcohol consumption patient again was admitted with significantly elevated alcohol level patient has history of portal hypertension and cirrhosis patient's baseline hemoglobin has been between the range of 9-10 patient's hemoglobin now is 9.4 patient has previous endoscopy consistent with portal gastropathy no significant varices.  Patient has history of depression with suicidal ideation patient has been seen in psych before patient has significant ascites with distended abdomen.  Patient also history of significant malnutrition and significantly advanced COPD patient is on multiple medications for his COPD.  Patient has been complaining of black tarry stools for last 1 week.  Patient drinks roughly about almost 1-3/4 of a liter of alcohol every day primarily vodka patient denies any history of vomiting hematemesis chest pain or any other significant systemic complaints.  Rest of review of system is negative    ROS: A comprehensive ten point review of systems was negative aside from those in mentioned in the HPI.      PAST MED HX:  I have reviewed this patient's medical history and updated it with pertinent information if needed.   Past Medical History:   Diagnosis Date     Alcoholism (H) 1/14/2013    had treatment at [facility]     Anxiety      Coronary artery disease 09/09/2014    Nuclear stress test with slight  fixed defect inferiorly, no ischemia     Depression     w/anxiety     Esophageal varices with bleeding 04/28/2018    6 varices banded on EGD     Heart attack (H)      Hepatomegaly     Fatty liver, chronic alcoholic     Hyperlipemia      Hypertension      JANIS on CPAP      Peripheral vascular disease (H)      PVD (peripheral vascular disease) (H)      SDH (subdural hematoma) (H) 04/26/2018    Right side, resolved spontaneously     Spinal stenosis        MEDICATIONS:   Prior to Admission Medications   Prescriptions Last Dose Informant Patient Reported? Taking?   DULoxetine (CYMBALTA) 60 MG capsule More than a month at Unknown time Self Yes No   Sig: Take 60 mg by mouth daily    QUEtiapine (SEROQUEL) 50 MG tablet More than a month at Unknown time Self Yes No   Sig: Take 50 mg by mouth nightly as needed   albuterol (PROAIR HFA/PROVENTIL HFA/VENTOLIN HFA) 108 (90 BASE) MCG/ACT Inhaler More than a month at Unknown time Self Yes No   Sig: Inhale 2 puffs into the lungs every 6 hours as needed    fluticasone-vilanterol (BREO ELLIPTA) 200-25 MCG/INH inhaler More than a month at Unknown time Self Yes No   Sig: Inhale 1 puff into the lungs daily    folic acid (FOLVITE) 1 MG tablet More than a month at Unknown time Self No No   Sig: Take 1 tablet (1 mg) by mouth daily   furosemide (LASIX) 40 MG tablet Past Week at Unknown time Self Yes Yes   Sig: Take 40 mg by mouth daily   hydrOXYzine (ATARAX) 50 MG tablet More than a month at Unknown time Self No No   Sig: Take 1 tablet (50 mg) by mouth every 6 hours as needed for anxiety Stopped at last discharge, but reports taking as needed.   lactulose 20 GM/30ML SOLN More than a month at Unknown time Self Yes No   Sig: Take 30 mLs by mouth 2 times daily   mirtazapine (REMERON) 15 MG tablet More than a month at Unknown time Self No No   Sig: Take 1 tablet (15 mg) by mouth At Bedtime   multivitamin, therapeutic with minerals (THERA-VIT-M) TABS tablet More than a month at Unknown time Self No  No   Sig: Take 1 tablet by mouth daily   order for DME DME at DME Self No No   Sig: Equipment being ordered: Walker Wheels () and Walker ()  Treatment Diagnosis: difficulty walking   spironolactone (ALDACTONE) 50 MG tablet More than a month at Unknown time Self No No   Sig: Take 1 tablet (50 mg) by mouth 2 times daily   traZODone (DESYREL) 100 MG tablet More than a month at Unknown time Self No No   Sig: Take 2 tablets (200 mg) by mouth At Bedtime      Facility-Administered Medications: None       ALLERGIES:   Allergies   Allergen Reactions     Amlodipine Swelling     Lisinopril      Other reaction(s): Angioedema  Mouth and tongue swelling   Mouth and tongue swelling          SOCIAL HISTORY:  Social History     Tobacco Use     Smoking status: Current Every Day Smoker     Packs/day: 1.00     Types: Cigarettes     Smokeless tobacco: Never Used     Tobacco comment: Chantix caused nightmares   Substance Use Topics     Alcohol use: Yes     Comment: Last drank Yesterday drinks 750 ml of vodka daily     Drug use: No       FAMILY HISTORY:  Family History   Problem Relation Age of Onset     Mental Illness Son      Coronary Artery Disease Early Onset Mother      Substance Abuse Father      Lung Cancer Father      Substance Abuse Brother      Unknown/Adopted No family hx of      Depression No family hx of      Anxiety Disorder No family hx of      Schizophrenia No family hx of      Bipolar Disorder No family hx of      Suicide No family hx of      Dementia No family hx of      Anthony Disease No family hx of      Parkinsonism No family hx of      Autism Spectrum Disorder No family hx of      Intellectual Disability (Mental Retardation) No family hx of        PHYSICAL EXAM:   General awake alert with signs of early alcohol withdrawal  Vital Signs with Ranges  Temp: 98.8  F (37.1  C) Temp src: Oral BP: 134/63 Pulse: 78 Heart Rate: 75 Resp: 20 SpO2: 95 % O2 Device: None (Room air) Oxygen Delivery: 2 LPM  I/O last 3  completed shifts:  In: 1505 [P.O.:980; I.V.:525]  Out: -     Constitutional: healthy, alert and no distress   Cardiovascular: negative, PMI normal. No lifts, heaves, or thrills. RRR. No murmurs, clicks gallops or rub  Respiratory: negative, Percussion normal. Good diaphragmatic excursion. Lungs clear  Head: Normocephalic. No masses, lesions, tenderness or abnormalities  Neck: Neck supple. No adenopathy. Thyroid symmetric, normal size,, Carotids without bruits.  Abdomen: Abdomen soft, non-tender. BS normal. No masses, organomegaly  SKIN: no suspicious lesions or rashes          ADDITIONAL COMMENTS:   I reviewed the patient's new clinical lab test results.   Recent Labs   Lab Test 03/08/20  1410 03/08/20  0547 03/07/20  2354  03/07/20  1235 02/26/20  0821 02/24/20  1439  02/07/20  0758   WBC  --  4.9  --   --  10.3 5.2  --    < > 3.4*   HGB 7.6* 7.4* 7.3*   < > 9.4* 10.0*  --    < > 8.2*   MCV  --  91  --   --  92 93  --    < > 97   PLT  --  86*  --   --  171 184  --    < > 58*   INR  --   --   --   --  1.24*  --  1.24*  --  1.36*    < > = values in this interval not displayed.     Recent Labs   Lab Test 03/08/20  0547 03/07/20  1235 02/26/20  0821   POTASSIUM 3.7 4.2 4.9   CHLORIDE 102 103 95   CO2 20 17* 27   BUN 30 34* 25   ANIONGAP 9 15* 4     Recent Labs   Lab Test 03/08/20  0547 02/07/20  0758 02/04/20  0800 02/03/20  0218  01/11/20  1730  11/13/19  1858 11/13/19  1849  02/13/19  2340  09/25/14  0510   ALBUMIN 2.7* 2.2* 2.3* 2.7*   < >  --    < > 3.0*  --    < > 3.1*   < >  --    BILITOTAL 2.1* 1.0 2.5* 1.6*   < >  --    < > 1.0  --    < > 0.4   < >  --    ALT 78* 19 22 26   < >  --    < > 24  --    < > 41   < >  --    * 37 53* 61*   < >  --    < > 58*  --    < > 89*   < >  --    PROTEIN  --   --   --   --   --  Negative  --   --  30*  --   --   --  Negative   LIPASE  --   --   --  369  --   --   --  499*  --   --  536*   < >  --     < > = values in this interval not displayed.       I reviewed the  patient's new imaging results.        CONSULTATION ASSESSMENT AND PLAN:    Active Problems:    Acute blood loss anemia    Assessment: Very pleasant 65-year-old gentleman with significantly complicated history for advanced chronic liver disease with decompensated cirrhosis history of GI bleed chronic anemia COPD patient is actively drinking patient has significantly elevated alcohol levels patient is already showing signs of some alcohol withdrawal.  At this point I will recommend the patient to be monitored with serial hemoglobins continue on 2 g sodium diet start him on antibiotics empirically schedule him for upper GI endoscopy if negative then colonoscopy and possible video capsule endoscopy patient will need further evaluation regarding his alcohol addiction and drug dependence which is very crucial in long-term evaluation and management.  Thank you very much for letting me participate in his care.               Rosendo Shaw MD, FACP  [facility].  Office: [phone]  Cell : [phone]    65-year-old gentleman with 1 week history of melanotic stools with hemoglobin of 9.4 which is similar to his baseline history of heavy alcohol consumption portal hypertension.  Patient's chart was reviewed.  Findings are suggestive of possible slow GI bleed peptic ulcer disease, portal hypertension versus lower GI bleed.  Plan for upper GI endoscopy tomorrow continue serial hemoglobin.  Continue IV Protonix.    Rosendo Shaw MD   [facility]

## 2020-03-09 ENCOUNTER — APPOINTMENT (OUTPATIENT)
Dept: ULTRASOUND IMAGING | Facility: CLINIC | Age: 65
DRG: 392 | End: 2020-03-09
Attending: INTERNAL MEDICINE
Payer: MEDICARE

## 2020-03-09 LAB
APPEARANCE FLD: CLEAR
COLOR FLD: YELLOW
ERYTHROCYTE [DISTWIDTH] IN BLOOD BY AUTOMATED COUNT: 14.3 % (ref 10–15)
GRAM STN SPEC: NORMAL
GRAM STN SPEC: NORMAL
HCT VFR BLD AUTO: 24.2 % (ref 40–53)
HGB BLD-MCNC: 8.1 G/DL (ref 13.3–17.7)
LYMPHOCYTES NFR FLD MANUAL: 33 %
MCH RBC QN AUTO: 30.8 PG (ref 26.5–33)
MCHC RBC AUTO-ENTMCNC: 33.5 G/DL (ref 31.5–36.5)
MCV RBC AUTO: 92 FL (ref 78–100)
MONOS+MACROS NFR FLD MANUAL: 9 %
NEUTS BAND NFR FLD MANUAL: 10 %
OTHER CELLS FLD MANUAL: 48 %
PLATELET # BLD AUTO: 69 10E9/L (ref 150–450)
RBC # BLD AUTO: 2.63 10E12/L (ref 4.4–5.9)
SPECIMEN SOURCE FLD: NORMAL
SPECIMEN SOURCE: NORMAL
WBC # BLD AUTO: 4.3 10E9/L (ref 4–11)
WBC # FLD AUTO: 258 /UL

## 2020-03-09 PROCEDURE — 25800030 ZZH RX IP 258 OP 636: Performed by: STUDENT IN AN ORGANIZED HEALTH CARE EDUCATION/TRAINING PROGRAM

## 2020-03-09 PROCEDURE — 89051 BODY FLUID CELL COUNT: CPT | Performed by: INTERNAL MEDICINE

## 2020-03-09 PROCEDURE — 49083 ABD PARACENTESIS W/IMAGING: CPT

## 2020-03-09 PROCEDURE — 99233 SBSQ HOSP IP/OBS HIGH 50: CPT | Performed by: INTERNAL MEDICINE

## 2020-03-09 PROCEDURE — 87070 CULTURE OTHR SPECIMN AEROBIC: CPT | Performed by: INTERNAL MEDICINE

## 2020-03-09 PROCEDURE — 0W9G3ZZ DRAINAGE OF PERITONEAL CAVITY, PERCUTANEOUS APPROACH: ICD-10-PCS | Performed by: RADIOLOGY

## 2020-03-09 PROCEDURE — 25000132 ZZH RX MED GY IP 250 OP 250 PS 637: Mod: GY | Performed by: INTERNAL MEDICINE

## 2020-03-09 PROCEDURE — 87205 SMEAR GRAM STAIN: CPT | Performed by: INTERNAL MEDICINE

## 2020-03-09 PROCEDURE — 25000125 ZZHC RX 250: Performed by: STUDENT IN AN ORGANIZED HEALTH CARE EDUCATION/TRAINING PROGRAM

## 2020-03-09 PROCEDURE — C9113 INJ PANTOPRAZOLE SODIUM, VIA: HCPCS | Performed by: STUDENT IN AN ORGANIZED HEALTH CARE EDUCATION/TRAINING PROGRAM

## 2020-03-09 PROCEDURE — 36415 COLL VENOUS BLD VENIPUNCTURE: CPT | Performed by: INTERNAL MEDICINE

## 2020-03-09 PROCEDURE — 25000128 H RX IP 250 OP 636: Performed by: STUDENT IN AN ORGANIZED HEALTH CARE EDUCATION/TRAINING PROGRAM

## 2020-03-09 PROCEDURE — 25000128 H RX IP 250 OP 636: Performed by: INTERNAL MEDICINE

## 2020-03-09 PROCEDURE — 12000000 ZZH R&B MED SURG/OB

## 2020-03-09 PROCEDURE — 40000007 ZZH STATISTIC ADULT CD FACE TO FACE-NO CHRG

## 2020-03-09 PROCEDURE — 40000863 ZZH STATISTIC RADIOLOGY XRAY, US, CT, MAR, NM

## 2020-03-09 PROCEDURE — 85027 COMPLETE CBC AUTOMATED: CPT | Performed by: INTERNAL MEDICINE

## 2020-03-09 PROCEDURE — 25000132 ZZH RX MED GY IP 250 OP 250 PS 637: Mod: GY | Performed by: STUDENT IN AN ORGANIZED HEALTH CARE EDUCATION/TRAINING PROGRAM

## 2020-03-09 RX ORDER — POLYETHYLENE GLYCOL 3350 17 G/17G
238 POWDER, FOR SOLUTION ORAL ONCE
Status: DISCONTINUED | OUTPATIENT
Start: 2020-03-09 | End: 2020-03-25

## 2020-03-09 RX ORDER — LIDOCAINE HYDROCHLORIDE 10 MG/ML
10 INJECTION, SOLUTION EPIDURAL; INFILTRATION; INTRACAUDAL; PERINEURAL ONCE
Status: COMPLETED | OUTPATIENT
Start: 2020-03-09 | End: 2020-03-09

## 2020-03-09 RX ORDER — ALBUMIN (HUMAN) 12.5 G/50ML
12.5 SOLUTION INTRAVENOUS ONCE
Status: DISCONTINUED | OUTPATIENT
Start: 2020-03-09 | End: 2020-03-12

## 2020-03-09 RX ORDER — NICOTINE POLACRILEX 4 MG
15-30 LOZENGE BUCCAL
Status: DISCONTINUED | OUTPATIENT
Start: 2020-03-09 | End: 2020-03-13

## 2020-03-09 RX ORDER — DEXTROSE MONOHYDRATE 25 G/50ML
25-50 INJECTION, SOLUTION INTRAVENOUS
Status: DISCONTINUED | OUTPATIENT
Start: 2020-03-09 | End: 2020-03-13

## 2020-03-09 RX ORDER — CEFTRIAXONE 1 G/1
1 INJECTION, POWDER, FOR SOLUTION INTRAMUSCULAR; INTRAVENOUS EVERY 24 HOURS
Status: DISCONTINUED | OUTPATIENT
Start: 2020-03-10 | End: 2020-03-14

## 2020-03-09 RX ADMIN — CEFTRIAXONE 1 G: 1 INJECTION, POWDER, FOR SOLUTION INTRAMUSCULAR; INTRAVENOUS at 09:29

## 2020-03-09 RX ADMIN — FOLIC ACID: 5 INJECTION, SOLUTION INTRAMUSCULAR; INTRAVENOUS; SUBCUTANEOUS at 14:41

## 2020-03-09 RX ADMIN — PANTOPRAZOLE SODIUM 40 MG: 40 INJECTION, POWDER, FOR SOLUTION INTRAVENOUS at 21:44

## 2020-03-09 RX ADMIN — TRAZODONE HYDROCHLORIDE 50 MG: 50 TABLET ORAL at 21:55

## 2020-03-09 RX ADMIN — LACTULOSE 30 ML: 10 SOLUTION ORAL at 21:44

## 2020-03-09 RX ADMIN — FLUTICASONE FUROATE AND VILANTEROL TRIFENATATE 1 PUFF: 200; 25 POWDER RESPIRATORY (INHALATION) at 09:29

## 2020-03-09 RX ADMIN — LACTULOSE 30 ML: 10 SOLUTION ORAL at 09:29

## 2020-03-09 RX ADMIN — MIRTAZAPINE 15 MG: 15 TABLET, FILM COATED ORAL at 21:44

## 2020-03-09 RX ADMIN — PANTOPRAZOLE SODIUM 40 MG: 40 INJECTION, POWDER, FOR SOLUTION INTRAVENOUS at 09:29

## 2020-03-09 RX ADMIN — DULOXETINE 60 MG: 60 CAPSULE, DELAYED RELEASE ORAL at 09:29

## 2020-03-09 RX ADMIN — SODIUM CHLORIDE, POTASSIUM CHLORIDE, SODIUM LACTATE AND CALCIUM CHLORIDE: 600; 310; 30; 20 INJECTION, SOLUTION INTRAVENOUS at 05:19

## 2020-03-09 RX ADMIN — LIDOCAINE HYDROCHLORIDE 10 ML: 10 INJECTION, SOLUTION EPIDURAL; INFILTRATION; INTRACAUDAL; PERINEURAL at 08:33

## 2020-03-09 ASSESSMENT — ACTIVITIES OF DAILY LIVING (ADL)
ADLS_ACUITY_SCORE: 17

## 2020-03-09 NOTE — PROCEDURES
[facility]    Procedure: Paracentesis    Date/Time: 3/9/2020 9:05 AM  Performed by: Troy High MD  Authorized by: Troy High MD     UNIVERSAL PROTOCOL   Site Marked: NA  Prior Images Obtained and Reviewed:  Yes  Required items: Required blood products, implants, devices and special equipment available    Patient identity confirmed:  Verbally with patient, arm band, provided demographic data and hospital-assigned identification number  Patient was reevaluated immediately before administering moderate or deep sedation or anesthesia  Confirmation Checklist:  Patient's identity using two indicators, relevant allergies, procedure was appropriate and matched the consent or emergent situation and correct equipment/implants were available  Time out: Immediately prior to the procedure a time out was called    Universal Protocol: the Joint Commission Universal Protocol was followed    Preparation: Patient was prepped and draped in usual sterile fashion    ESBL (mL):  4         ANESTHESIA    Anesthesia: Local infiltration  Local Anesthetic:  Lidocaine 1% without epinephrine      SEDATION    Patient Sedated: No    See dictated procedure note for full details.  Findings: US guided right abdominal paracentesis.  Yellow fluid removed.    Specimens: none    Complications: None    Condition: Stable    PROCEDURE   Patient Tolerance:  Patient tolerated the procedure well with no immediate complications    Length of time physician/provider present for 1:1 monitoring during sedation: 0

## 2020-03-09 NOTE — PLAN OF CARE
Summary:     Primary Diagnosis: Acute blood loss anemia  Orientation: A&Ox3 forgets time, easily redirected  Aggression Stop Light: green  Mobility: Ax1-2 GB/W  Pain Management: denies pain  Diet: Clear liquids - bowel prep in progress  Bowel/Bladder: incontinent  Abnormal Lab/Assessments: Hgb 7.6  Drain/Device/Wound: excoriated perianal/groin  Consults: GI, psych  D/C Day/Goals/Place: TBD    Shift Note: CIWA score 0, denies pain, distended abdomen, paracentesis and colonoscopy scheduled for 3/9/20, hx ETOH, cirrhosis, ascites.

## 2020-03-09 NOTE — PLAN OF CARE
Alert and oriented sometimes x4 sometimes to self only.  CIWA 1, 4, 7. Up with one and walker. Liquid stools most of the day due to lactulose and bowel prep from last night. Plan for NPO at midnight, colonoscopy tomorrow. Paracentis site on right abdomen CDI. Will continue to monitor.

## 2020-03-09 NOTE — PROGRESS NOTES
DANIELE Note:    D/I:  SW received a call from Isabel with Pre-Petition Screening ([phone]) who was calling to state that family is petitioning for commitment and was wondering if physician is willing to do examiner statement for commitment. CC paged physician to notify.    JULIA Navarro, [facility]  [phone]  [facility]

## 2020-03-09 NOTE — PROGRESS NOTES
[facility]  Gastroenterology Progress Note     Jim Richard MRN# [id number]   YOB: 1955 Age: 65 year old          Assessment and Plan:   Update: Patient now having light brown watery stools, previously black. Had paracentesis today, 3 L removed.  Hemoglobin stable and improved to 8.1.     Anemia  Melena  Cirrhosis with ascites and esophageal varices  Patient has had stable hemoglobin. EGD noted to have grade I esophageal varices with no evidence of bleed. Underwent ultrasound guided paracentesis this a.m. and had 3 L removed. Awaiting cultures. Currently on ceftriaxone with concerns for SBP.    -- Will plan colonoscopy tomorrow  -- Clear liquid diet  -- NPO after midnight  -- Daily CMP, CBC          Gastrointestinal hemorrhage, unspecified gastrointestinal hemorrhage type  Abdominal distension  Other ascites      Interval History:   no new complaints, doing well, denies chest pain, denies shortness of breath, denies abdominal pain, alert, oriented to person, place and time, has had a bowel movement in the last 24 hours and doing well; no cp, sob, n/v/d, or abd pain.              Review of Systems:   C: NEGATIVE for fever, chills, change in weight  E/M: NEGATIVE for ear, mouth and throat problems  R: NEGATIVE for significant cough or SOB  CV: NEGATIVE for chest pain, palpitations or peripheral edema             Medications:   I have reviewed this patient's current medications    albumin human  12.5 g Intravenous Once     cefTRIAXone  1 g Intravenous Q24H     DULoxetine  60 mg Oral Daily     fluticasone-vilanterol  1 puff Inhalation Daily     lactulose  30 mL Oral BID     lidocaine (buffered or not buffered)  5 mL Intradermal Once     mirtazapine  15 mg Oral At Bedtime     pantoprazole (PROTONIX) IV  40 mg Intravenous BID     polyethylene glycol  238 g Oral Once     sodium chloride (PF)  3 mL Intracatheter Q8H     sodium chloride (PF)  3 mL Intracatheter Q8H     IV Fluid with  vitamins   Intravenous Q24H                  Physical Exam:   Vitals were reviewed  Vital Signs with Ranges  Temp:  [98.1  F (36.7  C)-98.8  F (37.1  C)] 98.1  F (36.7  C)  Pulse:  [] 71  Heart Rate:  [] 78  Resp:  [16-25] 16  BP: (102-162)/(57-83) 151/68  SpO2:  [93 %-99 %] 96 %  I/O last 3 completed shifts:  In: 688 [P.O.:260; I.V.:428]  Out: -   Constitutional: healthy, alert and no distress   Cardiovascular: negative, PMI normal. No lifts, heaves, or thrills. RRR. No murmurs, clicks gallops or rub  Respiratory: negative, Percussion normal. Good diaphragmatic excursion. Lungs clear  Head: Normocephalic. No masses, lesions, tenderness or abnormalities  Neck: Neck supple. No adenopathy. Thyroid symmetric, normal size,, Carotids without bruits.  Abdomen: Abdomen soft, non-tender. BS normal. No masses, organomegaly           Data:   I reviewed the patient's new clinical lab test results.   Recent Labs   Lab Test 03/09/20  0710 03/08/20  2247 03/08/20  1410 03/08/20  0547  03/07/20  1235  02/24/20  1439  02/07/20  0758   WBC 4.3  --   --  4.9  --  10.3   < >  --    < > 3.4*   HGB 8.1* 7.9* 7.6* 7.4*   < > 9.4*   < >  --    < > 8.2*   MCV 92  --   --  91  --  92   < >  --    < > 97   PLT 69*  --   --  86*  --  171   < >  --    < > 58*   INR  --   --   --   --   --  1.24*  --  1.24*  --  1.36*    < > = values in this interval not displayed.     Recent Labs   Lab Test 03/08/20  0547 03/07/20  1235 02/26/20  0821   POTASSIUM 3.7 4.2 4.9   CHLORIDE 102 103 95   CO2 20 17* 27   BUN 30 34* 25   ANIONGAP 9 15* 4     Recent Labs   Lab Test 03/08/20  0547 02/07/20  0758 02/04/20  0800 02/03/20  0218  01/11/20  1730  11/13/19  1858 11/13/19  1849  02/13/19  2340  09/25/14  0510   ALBUMIN 2.7* 2.2* 2.3* 2.7*   < >  --    < > 3.0*  --    < > 3.1*   < >  --    BILITOTAL 2.1* 1.0 2.5* 1.6*   < >  --    < > 1.0  --    < > 0.4   < >  --    ALT 78* 19 22 26   < >  --    < > 24  --    < > 41   < >  --    * 37 53* 61*    < >  --    < > 58*  --    < > 89*   < >  --    PROTEIN  --   --   --   --   --  Negative  --   --  30*  --   --   --  Negative   LIPASE  --   --   --  369  --   --   --  499*  --   --  536*   < >  --     < > = values in this interval not displayed.       I reviewed the patient's new imaging results.    All laboratory data reviewed  All imaging studies reviewed by me.    Gloria De Leon PA-C,  3/9/2020  [facility]  Office : [phone]  Cell: [phone] (Dr. Shaw)  Cell: [phone] (Gloria De Leon PA-C)

## 2020-03-09 NOTE — PROGRESS NOTES
[facility]    Medicine Progress Note - Hospitalist Service       Date of Admission:  3/7/2020  Assessment & Plan   Jim Richard is a 64 year-old male with history of alcohol dependence, alcoholic cirrhosis with history of varices, depression, anxiety, coronary artery disease, obstructive sleep apnea, peripheral vascular disease, prior subdural hematoma who presents with alcohol intoxication and melena. Admitted 3/7/2020.      Melena  Gastrointestinal bleed, unspecified site  Presents with one week hx of melena.  Baseline hemoglobin 9-10 g/dl recent, on admission Hgb 9.4 g/dl. Last EGD 09/2019 showed normal esophagus with portal hypertensive gastropathy, normal duodenum. Mildly tachycardic on admission otherwise blood pressure stable. Suspect slow upper GI bleed.  - GI ([facility]) consulted, EGD did not show any bleeding ulcers, or esophageal varices, plan for colonoscopy tomorrow.  - Continue pantoprazole IV BID  - Avoid anticoagulation  - Continue serial hemoglobin   - Conditional transfusion orders for Hgb < 7 g/dl entered, patient has been consented for blood      Cirrhosis with ascites  Esophageal varices  PTA lactulose 10 g TID, propranolol 10 mg BID  - Continue lactulose   - Propranolol temporarily on hold to monitor hemodynamics closely with GIB  - Ceftriaxone IV ordered for SBP prophylaxis in setting of GIB, paracentesis does not indicate SBP.  - Hold diuretics in setting of GI bleed       Depression with hx of suicidal ideation  Anxiety  Prior to admission on duloxetine 60 mg daily, quetiapine 50 mg at bedtime PRN, trazodone 200 mg at bedtime, mirtazapine 15 mg at bedtime. Poor medication compliance, he has not taken these meds for few weeks now. Psych had seen previous hospitalization. He denies ongoing SI in the ED.   - Psychiatry consulted, wonder about commitment given ongoing alcohol abuse causing significant risks to health.   Reconsult psychiatry as the family is trying to Get him  committed to chemical dependency treatment if possible.  - Continue prior to admission duloxetine, mirtazapine, quetiapine PRN. Hold trazodone to limit serotonergic agents.  Patient is currently asking for  Trazodone.     Alcohol intoxication  Alcohol use disorder  Drinking up to 1-1.75 L vodka daily. BARNEY on admission at 0.29. Does have hx of DT's, but no h/o withdrawal seizures.   - IV vitamins  - CIWA protocol with lorazepam  - Psychiatry consulted as above  - Chemical dependency consulted , patient had multiple admissions here for similar complaints, chemical dependency treatment was not accepted in the past so they are hesitant to visit with him.     JANIS on CPAP  Continue CPAP per home settings     COPD  Tobacco abuse  Stable  - Continue prior to admission inhalers    Diet: Advance Diet as Tolerated: Clear Liquid Diet    DVT Prophylaxis: Pneumatic Compression Devices  Leger Catheter: not present  Code Status: DNR/DNI      Disposition Plan   Expected discharge: Pending EGD, stable hemoglobin, psychiatry assessment.  Entered: Kailey Skinner MD 03/09/2020, 9:17 AM     Total time spend 35 min >50% spend on coordination of care including discussion with patient, social work    The patient's care was discussed with the Bedside Nurse and Patient.    Kailey Skinner MD  Hospitalist Service  [facility]    ______________________________________________________________________    Interval History   patient is back from paracentesis, he does not see any difference following paracentesis, he denies any abdominal pain, denies any chest pain or shortness of breath.     Data reviewed today: I reviewed  All medications, new labs and imaging results over the last 24 hours. I personally reviewed no images or EKG's today.    Physical Exam   Vital Signs: Temp: 98.1  F (36.7  C) Temp src: Oral BP: (!) 151/68 Pulse: 71 Heart Rate: 78 Resp: 16 SpO2: 96 % O2 Device: None (Room air) Oxygen Delivery: 2 LPM  Weight: 0 lbs  0 oz    Constitutional: Awake, alert, cooperative, no apparent distress, bilateral upper extremity tremors noted  Respiratory: Clear to auscultation bilaterally, no crackles or wheezing  Cardiovascular: Regular rate and rhythm, normal S1 and S2, and no murmur noted  GI: Normal bowel sounds, soft, distended, non-tender  Skin/Integumen: No rashes, no cyanosis, no edema  Neuro : moving all 4 extremities, no focal deficit noted       Data   Recent Labs   Lab 03/09/20  0710 03/08/20  2247 03/08/20  1410 03/08/20  0547  03/07/20  1235   WBC 4.3  --   --  4.9  --  10.3   HGB 8.1* 7.9* 7.6* 7.4*   < > 9.4*   MCV 92  --   --  91  --  92   PLT 69*  --   --  86*  --  171   INR  --   --   --   --   --  1.24*   NA  --   --   --  131*  --  135   POTASSIUM  --   --   --  3.7  --  4.2   CHLORIDE  --   --   --  102  --  103   CO2  --   --   --  20  --  17*   BUN  --   --   --  30  --  34*   CR  --   --   --  1.26*  --  1.20   ANIONGAP  --   --   --  9  --  15*   KEV  --   --   --  8.2*  --  8.4*   GLC  --   --   --  92  --  70   ALBUMIN  --   --   --  2.7*  --   --    PROTTOTAL  --   --   --  6.3*  --   --    BILITOTAL  --   --   --  2.1*  --   --    ALKPHOS  --   --   --  194*  --   --    ALT  --   --   --  78*  --   --    AST  --   --   --  348*  --   --     < > = values in this interval not displayed.       No results found for this or any previous visit (from the past 24 hour(s)).  Medications     lactated ringers 75 mL/hr at 03/09/20 0519     - MEDICATION INSTRUCTIONS -       - MEDICATION INSTRUCTIONS -       - MEDICATION INSTRUCTIONS -       - MEDICATION INSTRUCTIONS -         albumin human  12.5 g Intravenous Once     cefTRIAXone  1 g Intravenous Q24H     DULoxetine  60 mg Oral Daily     fluticasone-vilanterol  1 puff Inhalation Daily     lactulose  30 mL Oral BID     lidocaine (buffered or not buffered)  5 mL Intradermal Once     mirtazapine  15 mg Oral At Bedtime     pantoprazole (PROTONIX) IV  40 mg Intravenous BID      sodium chloride (PF)  3 mL Intracatheter Q8H     sodium chloride (PF)  3 mL Intracatheter Q8H     IV Fluid with vitamins   Intravenous Q24H

## 2020-03-09 NOTE — CONSULTS
3/9/20 chem dep consult acknowledged and closed.      Patient was seen by myself on 2/13/20 for updated Rule 25 with recommendation to residential treatment.  Patient has Medicare and is over-income limits for Medicaid insurance so is limited to where he can seen services.  Per EHR, patient was denied admission to multiple substance use treatment programs due to his cognitive issues and SLUMS scores.  I discussed care plan with unit  on 2/21/20 and recommended community supports and county case management services.  Per EHR review, it appears patient was assigned a county  and just prior to discharge he was accepted to [facility] inpatient treatment program but he declined admission and was discharged to home.  At this time, patient will not be seen by chem dep as his updated evaluation is still within 30 days, it can be used to make referrals; however, patient historically has a lack of follow through at discharge.  Recommend patient follow up on outpatient evaluation at [facility] should he desire to seek treatment services after this hospital stay.    Sherly Royal, [facility]  [phone]

## 2020-03-09 NOTE — CONSULTS
"CLINICAL NUTRITION SERVICES  -  ASSESSMENT NOTE      Future/Additional Recommendations:     Once diet advanced, will send a Boost supplement for added cals/pro     Malnutrition:   % Weight Loss: unable to assess (last recorded wt was 2/25)  % Intake:  No decreased intake noted (pt denies any decreased po intake)  Subcutaneous Fat Loss:  None observed  Muscle Loss:  Temporal region - mild depletion  Fluid Retention: trace    Malnutrition Diagnosis: Patient does not meet two of the above criteria necessary for diagnosing malnutrition, however, suspect some risk of malnutrition 2' to ETOH and recent diarrhea/GIB        REASON FOR ASSESSMENT  Jim Richard is a 65 year old male seen by Registered Dietitian for Admission Nutrition Risk Screen for unintentional loss of 10# or more in the past two months and reduced oral intake over the last month      NUTRITION HISTORY  Chart reviewed  \"Patient drinks roughly about almost 1-3/4 of a liter of alcohol every day primarily vodka \"    Pt known from recent admit last month  Was eating very well - ordering 3 big meals per day  Also consuming a Boost supplement once daily    Visited with pt this morning  He tells me that he was still eating meals at home PTA  Breakfast - bowl of cold cereal  Lunch - sandwich (likes fried egg sandwiches)  Dinner - chicken, frozen meals  States it is hard to cook for one  Question how much he has been eating 2' to ETOH consumption and diarrhea past week      CURRENT NUTRITION ORDERS  Diet Order:     Clear Liquid     Pt states he is hungry and would like to eat more than clear liquids  Has been drinking apple juice  Does note that he continues to have loose stools    Would like Boost supplements once diet advanced      NUTRITION FOCUSED PHYSICAL ASSESSMENT FOR DIAGNOSING MALNUTRITION)  Yes               Observed:    Muscle wasting (refer to documentation in Malnutrition section)    Obtained from Chart/Interdisciplinary Team:  3/8: EGD noted to " "have grade I esophageal varices with no evidence of bleed  3/9: paracentesis - 3 liters  Plan for colonoscopy 3/10    Cirrhosis with ascites    ANTHROPOMETRICS  Height: 5'7\"  Weight: no wt taken   IBW: 67.3 kg  Weight History: Pt does not think he has lost wt - thinks he is probably ~180#.  Feels that he has retained muscle/strength in his arms/legs.  Wt records do not reflect wt loss.  Wt Readings from Last 10 Encounters:   02/25/20 82.3 kg (181 lb 8 oz)   01/16/20 83.5 kg (184 lb 1.4 oz)   12/24/19 88.5 kg (195 lb)   11/19/19 77.1 kg (170 lb)   11/13/19 77.1 kg (170 lb)   10/30/19 77.8 kg (171 lb 9.6 oz)   10/01/19 75.8 kg (167 lb)   08/13/19 84 kg (185 lb 3 oz)   07/10/19 90.7 kg (200 lb)   07/03/19 90.7 kg (200 lb)         LABS  Labs reviewed    MEDICATIONS  Lactulose   Banana bag (multivitamin, folic acid, thiamine)  IVF @ 75 mL/hr      ASSESSED NUTRITION NEEDS PER APPROVED PRACTICE GUIDELINES:    Dosing Weight 67.3 kg (IBW)  Estimated Energy Needs: 9664-9270 kcals (25-30 Kcal/Kg)  Justification: maintenance  Estimated Protein Needs:  grams protein (1.2-1.5 g pro/Kg)  Justification: preservation of lean body mass  Estimated Fluid Needs: 1556-5630  mL (1 mL/Kcal)  Justification: maintenance    MALNUTRITION:  % Weight Loss: unable to assess (last recorded wt was 2/25)  % Intake:  No decreased intake noted (pt denies any decreased po intake)  Subcutaneous Fat Loss:  None observed  Muscle Loss:  Temporal region - mild depletion  Fluid Retention: trace    Malnutrition Diagnosis: Patient does not meet two of the above criteria necessary for diagnosing malnutrition, however, suspect some risk of malnutrition 2' to ETOH and recent diarrhea/GIB      NUTRITION DIAGNOSIS:  Inadequate protein-energy intake related to restricted diet as evidenced by pt currently on a clear liquid diet      NUTRITION INTERVENTIONS  Recommendations / Nutrition Prescription  Clear liquid (advance per MD)  Nutrition supplement  Ordered " current wt  .      Implementation  Nutrition education ---> Reviewed current diet order  Once diet advanced, will send a Boost supplement for added cals/pro  .      Nutrition Goals  Pt to have diet advanced in the next 48 hrs  .      MONITORING AND EVALUATION:  Progress towards goals will be monitored and evaluated per protocol and Practice Guidelines

## 2020-03-10 LAB
ALBUMIN SERPL-MCNC: 2.4 G/DL (ref 3.4–5)
ALP SERPL-CCNC: 170 U/L (ref 40–150)
ALT SERPL W P-5'-P-CCNC: 153 U/L (ref 0–70)
ANION GAP SERPL CALCULATED.3IONS-SCNC: 6 MMOL/L (ref 3–14)
AST SERPL W P-5'-P-CCNC: 365 U/L (ref 0–45)
BILIRUB SERPL-MCNC: 1.1 MG/DL (ref 0.2–1.3)
BUN SERPL-MCNC: 17 MG/DL (ref 7–30)
CALCIUM SERPL-MCNC: 8.5 MG/DL (ref 8.5–10.1)
CHLORIDE SERPL-SCNC: 107 MMOL/L (ref 94–109)
CO2 SERPL-SCNC: 23 MMOL/L (ref 20–32)
COLONOSCOPY: NORMAL
CREAT SERPL-MCNC: 1 MG/DL (ref 0.66–1.25)
GFR SERPL CREATININE-BSD FRML MDRD: 79 ML/MIN/{1.73_M2}
GLUCOSE SERPL-MCNC: 98 MG/DL (ref 70–99)
POTASSIUM SERPL-SCNC: 3.2 MMOL/L (ref 3.4–5.3)
PROT SERPL-MCNC: 5.7 G/DL (ref 6.8–8.8)
SODIUM SERPL-SCNC: 136 MMOL/L (ref 133–144)

## 2020-03-10 PROCEDURE — 80053 COMPREHEN METABOLIC PANEL: CPT | Performed by: INTERNAL MEDICINE

## 2020-03-10 PROCEDURE — G0500 MOD SEDAT ENDO SERVICE >5YRS: HCPCS | Performed by: INTERNAL MEDICINE

## 2020-03-10 PROCEDURE — C9113 INJ PANTOPRAZOLE SODIUM, VIA: HCPCS | Performed by: STUDENT IN AN ORGANIZED HEALTH CARE EDUCATION/TRAINING PROGRAM

## 2020-03-10 PROCEDURE — 99232 SBSQ HOSP IP/OBS MODERATE 35: CPT | Performed by: INTERNAL MEDICINE

## 2020-03-10 PROCEDURE — 45378 DIAGNOSTIC COLONOSCOPY: CPT | Performed by: INTERNAL MEDICINE

## 2020-03-10 PROCEDURE — 25000132 ZZH RX MED GY IP 250 OP 250 PS 637: Mod: GY | Performed by: INTERNAL MEDICINE

## 2020-03-10 PROCEDURE — 25000125 ZZHC RX 250: Performed by: STUDENT IN AN ORGANIZED HEALTH CARE EDUCATION/TRAINING PROGRAM

## 2020-03-10 PROCEDURE — 40000863 ZZH STATISTIC RADIOLOGY XRAY, US, CT, MAR, NM

## 2020-03-10 PROCEDURE — 25000128 H RX IP 250 OP 636: Performed by: INTERNAL MEDICINE

## 2020-03-10 PROCEDURE — 25000132 ZZH RX MED GY IP 250 OP 250 PS 637: Mod: GY | Performed by: PHYSICIAN ASSISTANT

## 2020-03-10 PROCEDURE — 0DJD8ZZ INSPECTION OF LOWER INTESTINAL TRACT, VIA NATURAL OR ARTIFICIAL OPENING ENDOSCOPIC: ICD-10-PCS | Performed by: INTERNAL MEDICINE

## 2020-03-10 PROCEDURE — 36415 COLL VENOUS BLD VENIPUNCTURE: CPT | Performed by: INTERNAL MEDICINE

## 2020-03-10 PROCEDURE — 25000128 H RX IP 250 OP 636: Performed by: STUDENT IN AN ORGANIZED HEALTH CARE EDUCATION/TRAINING PROGRAM

## 2020-03-10 PROCEDURE — 25000132 ZZH RX MED GY IP 250 OP 250 PS 637: Mod: GY | Performed by: STUDENT IN AN ORGANIZED HEALTH CARE EDUCATION/TRAINING PROGRAM

## 2020-03-10 PROCEDURE — 25800030 ZZH RX IP 258 OP 636: Performed by: STUDENT IN AN ORGANIZED HEALTH CARE EDUCATION/TRAINING PROGRAM

## 2020-03-10 PROCEDURE — 12000000 ZZH R&B MED SURG/OB

## 2020-03-10 RX ORDER — FLUMAZENIL 0.1 MG/ML
0.2 INJECTION, SOLUTION INTRAVENOUS
Status: ACTIVE | OUTPATIENT
Start: 2020-03-10 | End: 2020-03-11

## 2020-03-10 RX ORDER — LIDOCAINE 40 MG/G
CREAM TOPICAL
Status: DISCONTINUED | OUTPATIENT
Start: 2020-03-10 | End: 2020-03-10 | Stop reason: HOSPADM

## 2020-03-10 RX ORDER — NICOTINE POLACRILEX 4 MG
15-30 LOZENGE BUCCAL
Status: DISCONTINUED | OUTPATIENT
Start: 2020-03-10 | End: 2020-03-10

## 2020-03-10 RX ORDER — MAGNESIUM CARB/ALUMINUM HYDROX 105-160MG
296 TABLET,CHEWABLE ORAL ONCE
Status: COMPLETED | OUTPATIENT
Start: 2020-03-10 | End: 2020-03-10

## 2020-03-10 RX ORDER — DEXTROSE MONOHYDRATE 25 G/50ML
25-50 INJECTION, SOLUTION INTRAVENOUS
Status: DISCONTINUED | OUTPATIENT
Start: 2020-03-10 | End: 2020-03-10

## 2020-03-10 RX ORDER — FENTANYL CITRATE 50 UG/ML
INJECTION, SOLUTION INTRAMUSCULAR; INTRAVENOUS PRN
Status: DISCONTINUED | OUTPATIENT
Start: 2020-03-10 | End: 2020-03-10 | Stop reason: HOSPADM

## 2020-03-10 RX ORDER — NALOXONE HYDROCHLORIDE 0.4 MG/ML
.1-.4 INJECTION, SOLUTION INTRAMUSCULAR; INTRAVENOUS; SUBCUTANEOUS
Status: ACTIVE | OUTPATIENT
Start: 2020-03-10 | End: 2020-03-11

## 2020-03-10 RX ADMIN — MIRTAZAPINE 15 MG: 15 TABLET, FILM COATED ORAL at 22:21

## 2020-03-10 RX ADMIN — LORAZEPAM 1 MG: 1 TABLET ORAL at 09:50

## 2020-03-10 RX ADMIN — FOLIC ACID: 5 INJECTION, SOLUTION INTRAMUSCULAR; INTRAVENOUS; SUBCUTANEOUS at 13:29

## 2020-03-10 RX ADMIN — LACTULOSE 15 ML: 10 SOLUTION ORAL at 20:49

## 2020-03-10 RX ADMIN — PANTOPRAZOLE SODIUM 40 MG: 40 INJECTION, POWDER, FOR SOLUTION INTRAVENOUS at 20:50

## 2020-03-10 RX ADMIN — DULOXETINE 60 MG: 60 CAPSULE, DELAYED RELEASE ORAL at 08:32

## 2020-03-10 RX ADMIN — PANTOPRAZOLE SODIUM 40 MG: 40 INJECTION, POWDER, FOR SOLUTION INTRAVENOUS at 08:25

## 2020-03-10 RX ADMIN — SODIUM CHLORIDE, POTASSIUM CHLORIDE, SODIUM LACTATE AND CALCIUM CHLORIDE: 600; 310; 30; 20 INJECTION, SOLUTION INTRAVENOUS at 01:15

## 2020-03-10 RX ADMIN — FLUTICASONE FUROATE AND VILANTEROL TRIFENATATE 1 PUFF: 200; 25 POWDER RESPIRATORY (INHALATION) at 08:24

## 2020-03-10 RX ADMIN — MAGNESIUM CITRATE 296 ML: 1.75 LIQUID ORAL at 12:35

## 2020-03-10 RX ADMIN — CEFTRIAXONE SODIUM 1 G: 1 INJECTION, POWDER, FOR SOLUTION INTRAMUSCULAR; INTRAVENOUS at 08:24

## 2020-03-10 RX ADMIN — ONDANSETRON 4 MG: 4 TABLET, ORALLY DISINTEGRATING ORAL at 09:50

## 2020-03-10 ASSESSMENT — ACTIVITIES OF DAILY LIVING (ADL)
ADLS_ACUITY_SCORE: 17

## 2020-03-10 NOTE — PROGRESS NOTES
[facility]  Gastroenterology Progress Note     Jim Richard MRN# [id number]   YOB: 1955 Age: 65 year old          Assessment and Plan:   Update: Patient now having light brown watery stools, previously black. Has been on clear liquids for 2 days and has prepped for colonoscopy. Hemoglobin stable and improved to 8.1. LFTs reain elevated, albumin 2.4, potassium 3.4.     Anemia  Melena  Cirrhosis with ascites and esophageal varices  Patient has had stable hemoglobin. EGD noted to have grade I esophageal varices with no evidence of bleed. Underwent ultrasound guided paracentesis on 3/9/2020. and had 3 L removed. Awaiting cultures. Currently on ceftriaxone with concerns for SBP. Has done well with colon prep yesterday. Will have patient take a dose of magnesium citrate this morning. Patient confused     -- Will plan colonoscopy today  -- NPO  -- Daily CMP, CBC, ammonia          Gastrointestinal hemorrhage, unspecified gastrointestinal hemorrhage type  Abdominal distension  Other ascites      Interval History:   no new complaints, denies chest pain, denies shortness of breath, denies abdominal pain, has had a bowel movement in the last 24 hours and doing well; no cp, sob, n/v/d, or abd pain.              Review of Systems:   C: NEGATIVE for fever, chills, change in weight  E/M: NEGATIVE for ear, mouth and throat problems  R: NEGATIVE for significant cough or SOB  CV: NEGATIVE for chest pain, palpitations or peripheral edema             Medications:   I have reviewed this patient's current medications    albumin human  12.5 g Intravenous Once     cefTRIAXone  1 g Intravenous Q24H     DULoxetine  60 mg Oral Daily     fluticasone-vilanterol  1 puff Inhalation Daily     lactulose  30 mL Oral BID     lidocaine (buffered or not buffered)  5 mL Intradermal Once     mirtazapine  15 mg Oral At Bedtime     pantoprazole (PROTONIX) IV  40 mg Intravenous BID     polyethylene glycol  238 g Oral Once      sodium chloride (PF)  3 mL Intracatheter Q8H     sodium chloride (PF)  3 mL Intracatheter Q8H     sodium chloride (PF)  3 mL Intracatheter Q8H     IV Fluid with vitamins   Intravenous Q24H                  Physical Exam:   Vitals were reviewed  Vital Signs with Ranges  Temp:  [96.6  F (35.9  C)-98.7  F (37.1  C)] 96.6  F (35.9  C)  Pulse:  [71] 71  Heart Rate:  [61-88] 61  Resp:  [16] 16  BP: (136-152)/(66-69) 136/68  SpO2:  [93 %-97 %] 95 %  I/O last 3 completed shifts:  In: 1596.25 [P.O.:240; I.V.:1356.25]  Out: 4900 [Urine:4900]  Constitutional: alert, mild distress, cooperative and pale   Cardiovascular: negative, PMI normal. No lifts, heaves, or thrills. RRR. No murmurs, clicks gallops or rub  Respiratory: negative, Percussion normal. Good diaphragmatic excursion. Lungs clear  Head: Normocephalic. No masses, lesions, tenderness or abnormalities  Neck: Neck supple. No adenopathy. Thyroid symmetric, normal size,, Carotids without bruits.  Abdomen: Abdomen soft, non-tender. BS normal. No masses, organomegaly           Data:   I reviewed the patient's new clinical lab test results.   Recent Labs   Lab Test 03/09/20  0710 03/08/20  2247 03/08/20  1410 03/08/20  0547  03/07/20  1235  02/24/20  1439  02/07/20  0758   WBC 4.3  --   --  4.9  --  10.3   < >  --    < > 3.4*   HGB 8.1* 7.9* 7.6* 7.4*   < > 9.4*   < >  --    < > 8.2*   MCV 92  --   --  91  --  92   < >  --    < > 97   PLT 69*  --   --  86*  --  171   < >  --    < > 58*   INR  --   --   --   --   --  1.24*  --  1.24*  --  1.36*    < > = values in this interval not displayed.     Recent Labs   Lab Test 03/10/20  0703 03/08/20  0547 03/07/20  1235   POTASSIUM 3.2* 3.7 4.2   CHLORIDE 107 102 103   CO2 23 20 17*   BUN 17 30 34*   ANIONGAP 6 9 15*     Recent Labs   Lab Test 03/10/20  0703 03/08/20  0547 02/07/20  0758  02/03/20  0218  01/11/20  1730  11/13/19  1858 11/13/19  1849  02/13/19  2340  09/25/14  0510   ALBUMIN 2.4* 2.7* 2.2*   < > 2.7*   < >  --     < > 3.0*  --    < > 3.1*   < >  --    BILITOTAL 1.1 2.1* 1.0   < > 1.6*   < >  --    < > 1.0  --    < > 0.4   < >  --    * 78* 19   < > 26   < >  --    < > 24  --    < > 41   < >  --    * 348* 37   < > 61*   < >  --    < > 58*  --    < > 89*   < >  --    PROTEIN  --   --   --   --   --   --  Negative  --   --  30*  --   --   --  Negative   LIPASE  --   --   --   --  369  --   --   --  499*  --   --  536*   < >  --     < > = values in this interval not displayed.       I reviewed the patient's new imaging results.    All laboratory data reviewed  All imaging studies reviewed by me.    Gloria De Leon PA-C,  3/10/2020  [facility]  Office : [phone]  Cell: [phone] (Dr. Shaw)  Cell: [phone] (Gloria De Leon PA-C)

## 2020-03-10 NOTE — PROGRESS NOTES
[id number] shift   Pt is A/Ox3. VSS, on RA. Denies pain. CIWA 7,3,2,2. Ativan given x1 w/ good effect. Colonoscopy completed today-negative for bleeding. Mag citrate given prior to procedure, multiple watery stools since. Advanced to 2g Na diet, tolerating well. Abd soft, rounded. Paracentesis site CDI. Up w/ assist x1 w/ GB/walker. PIV w/ IVF + thiamine. Discharge pending psych's recommendation on treatment.

## 2020-03-10 NOTE — PLAN OF CARE
A&O. VSS. Denies pain. CIWA 7 & 3. 3-4 loose stools since 1900. Colonoscopy planned for today. NPO. Abdomen rounded, soft. Paracentesis site CDI. Up with 1 W/GB.

## 2020-03-10 NOTE — PROGRESS NOTES
[facility]    Medicine Progress Note - Hospitalist Service       Date of Admission:  3/7/2020  Assessment & Plan   Jim Richard is a 64 year-old male with history of alcohol dependence, alcoholic cirrhosis with history of varices, depression, anxiety, coronary artery disease, obstructive sleep apnea, peripheral vascular disease, prior subdural hematoma who presents with alcohol intoxication and melena. Admitted 3/7/2020.      Melena  Gastrointestinal bleed, unspecified site  Presents with one week hx of melena.  Baseline hemoglobin 9-10 g/dl recent, on admission Hgb 9.4 g/dl. Last EGD 09/2019 showed normal esophagus with portal hypertensive gastropathy, normal duodenum. Mildly tachycardic on admission otherwise blood pressure stable. Suspect slow upper GI bleed.  - GI ([facility]) consulted, EGD did not show any bleeding ulcers, or esophageal varices, plan for colonoscopy today.  - Continue pantoprazole IV BID  - Avoid anticoagulation  - Continue serial hemoglobin ,cbc in am   - Conditional transfusion orders for Hgb < 7 g/dl entered, patient has been consented for blood      Cirrhosis with ascites  Esophageal varices  PTA lactulose 10 g TID, propranolol 10 mg BID  - Continue lactulose   - Propranolol temporarily on hold to monitor hemodynamics closely with GIB  - Ceftriaxone IV ordered for SBP prophylaxis in setting of GIB, paracentesis does not indicate SBP.  Culture pending  - Hold diuretics in setting of GI bleed       Depression with hx of suicidal ideation  Anxiety  Prior to admission on duloxetine 60 mg daily, quetiapine 50 mg at bedtime PRN, trazodone 200 mg at bedtime, mirtazapine 15 mg at bedtime. Poor medication compliance, he has not taken these meds for few weeks now. Psych had seen previous hospitalization. He denies ongoing SI in the ED.   - Psychiatry consulted, wonder about commitment given ongoing alcohol abuse causing significant risks to health.   Reconsult psychiatry as the  family is trying to Get him committed to chemical dependency treatment if possible.  - Continue prior to admission duloxetine, mirtazapine, quetiapine PRN. Hold trazodone to limit serotonergic agents.  Patient is currently asking for  Trazodone.     Alcohol intoxication  Alcohol use disorder  Drinking up to 1-1.75 L vodka daily. BARNEY on admission at 0.29. Does have hx of DT's, but no h/o withdrawal seizures.   - IV vitamins  - CIWA protocol with lorazepam  - Psychiatry consulted as above  - Chemical dependency consulted , patient had multiple admissions here for similar complaints, chemical dependency treatment was not accepted in the past so they are hesitant to visit with him.     JANIS on CPAP  Continue CPAP per home settings     COPD  Tobacco abuse  Stable  - Continue prior to admission inhalers    Diet: NPO per Anesthesia Guidelines for Procedure/Surgery Except for: Meds    DVT Prophylaxis: Pneumatic Compression Devices  Leger Catheter: not present  Code Status: DNR/DNI      Disposition Plan   Expected discharge: Pending EGD, stable hemoglobin, psychiatry assessment.  Entered: Kailey Skinner MD 03/10/2020, 1:27 PM         The patient's care was discussed with the Bedside Nurse and Patient.    Kailey Skinner MD  Hospitalist Service  [facility]    ______________________________________________________________________    Interval History   Patient has been prepping for colonoscopy overnight, he is very tired now, denies any new complaints, he developed some abdominal distention, no pain.    Data reviewed today: I reviewed  All medications, new labs and imaging results over the last 24 hours. I personally reviewed no images or EKG's today.    Physical Exam   Vital Signs: Temp: 98.5  F (36.9  C) Temp src: Oral BP: 135/70 Pulse: 71 Heart Rate: 67 Resp: 16 SpO2: 95 % O2 Device: None (Room air)    Weight: 0 lbs 0 oz    Constitutional: Awake, alert, cooperative, no apparent distress, bilateral upper  extremity tremors noted  Respiratory: Clear to auscultation bilaterally, no crackles or wheezing  Cardiovascular: Regular rate and rhythm, normal S1 and S2, and no murmur noted  GI: Normal bowel sounds, soft, distended, non-tender  Skin/Integumen: No rashes, no cyanosis, trace lower extremity edema noted.  Neuro : moving all 4 extremities, no focal deficit noted       Data   Recent Labs   Lab 03/10/20  0703 03/09/20  0710 03/08/20  2247 03/08/20  1410 03/08/20  0547  03/07/20  1235   WBC  --  4.3  --   --  4.9  --  10.3   HGB  --  8.1* 7.9* 7.6* 7.4*   < > 9.4*   MCV  --  92  --   --  91  --  92   PLT  --  69*  --   --  86*  --  171   INR  --   --   --   --   --   --  1.24*     --   --   --  131*  --  135   POTASSIUM 3.2*  --   --   --  3.7  --  4.2   CHLORIDE 107  --   --   --  102  --  103   CO2 23  --   --   --  20  --  17*   BUN 17  --   --   --  30  --  34*   CR 1.00  --   --   --  1.26*  --  1.20   ANIONGAP 6  --   --   --  9  --  15*   KEV 8.5  --   --   --  8.2*  --  8.4*   GLC 98  --   --   --  92  --  70   ALBUMIN 2.4*  --   --   --  2.7*  --   --    PROTTOTAL 5.7*  --   --   --  6.3*  --   --    BILITOTAL 1.1  --   --   --  2.1*  --   --    ALKPHOS 170*  --   --   --  194*  --   --    *  --   --   --  78*  --   --    *  --   --   --  348*  --   --     < > = values in this interval not displayed.       No results found for this or any previous visit (from the past 24 hour(s)).  Medications     lactated ringers 75 mL/hr at 03/10/20 0115     - MEDICATION INSTRUCTIONS -       - MEDICATION INSTRUCTIONS -       - MEDICATION INSTRUCTIONS -       - MEDICATION INSTRUCTIONS -       - MEDICATION INSTRUCTIONS -       - MEDICATION INSTRUCTIONS -         albumin human  12.5 g Intravenous Once     cefTRIAXone  1 g Intravenous Q24H     DULoxetine  60 mg Oral Daily     fluticasone-vilanterol  1 puff Inhalation Daily     lactulose  30 mL Oral BID     lidocaine (buffered or not buffered)  5 mL  Intradermal Once     mirtazapine  15 mg Oral At Bedtime     pantoprazole (PROTONIX) IV  40 mg Intravenous BID     polyethylene glycol  238 g Oral Once     sodium chloride (PF)  3 mL Intracatheter Q8H     sodium chloride (PF)  3 mL Intracatheter Q8H     sodium chloride (PF)  3 mL Intracatheter Q8H     IV Fluid with vitamins   Intravenous Q24H

## 2020-03-11 LAB
ALBUMIN SERPL-MCNC: 2.5 G/DL (ref 3.4–5)
AMMONIA PLAS-SCNC: 37 UMOL/L (ref 10–50)
BASOPHILS # BLD AUTO: 0 10E9/L (ref 0–0.2)
BASOPHILS NFR BLD AUTO: 0.3 %
DIFFERENTIAL METHOD BLD: ABNORMAL
EOSINOPHIL # BLD AUTO: 0.2 10E9/L (ref 0–0.7)
EOSINOPHIL NFR BLD AUTO: 6.5 %
ERYTHROCYTE [DISTWIDTH] IN BLOOD BY AUTOMATED COUNT: 14.6 % (ref 10–15)
HCT VFR BLD AUTO: 25.9 % (ref 40–53)
HGB BLD-MCNC: 8 G/DL (ref 13.3–17.7)
HGB BLD-MCNC: 8.6 G/DL (ref 13.3–17.7)
IMM GRANULOCYTES # BLD: 0 10E9/L (ref 0–0.4)
IMM GRANULOCYTES NFR BLD: 0 %
LYMPHOCYTES # BLD AUTO: 0.4 10E9/L (ref 0.8–5.3)
LYMPHOCYTES NFR BLD AUTO: 11.9 %
MCH RBC QN AUTO: 30.9 PG (ref 26.5–33)
MCHC RBC AUTO-ENTMCNC: 33.2 G/DL (ref 31.5–36.5)
MCV RBC AUTO: 93 FL (ref 78–100)
MONOCYTES # BLD AUTO: 0.4 10E9/L (ref 0–1.3)
MONOCYTES NFR BLD AUTO: 11.4 %
NEUTROPHILS # BLD AUTO: 2.6 10E9/L (ref 1.6–8.3)
NEUTROPHILS NFR BLD AUTO: 69.9 %
NRBC # BLD AUTO: 0 10*3/UL
NRBC BLD AUTO-RTO: 0 /100
PLATELET # BLD AUTO: 83 10E9/L (ref 150–450)
RBC # BLD AUTO: 2.78 10E12/L (ref 4.4–5.9)
WBC # BLD AUTO: 3.7 10E9/L (ref 4–11)

## 2020-03-11 PROCEDURE — 82140 ASSAY OF AMMONIA: CPT | Performed by: PHYSICIAN ASSISTANT

## 2020-03-11 PROCEDURE — 25000128 H RX IP 250 OP 636: Performed by: INTERNAL MEDICINE

## 2020-03-11 PROCEDURE — 85025 COMPLETE CBC W/AUTO DIFF WBC: CPT | Performed by: INTERNAL MEDICINE

## 2020-03-11 PROCEDURE — 25000132 ZZH RX MED GY IP 250 OP 250 PS 637: Mod: GY | Performed by: INTERNAL MEDICINE

## 2020-03-11 PROCEDURE — 99232 SBSQ HOSP IP/OBS MODERATE 35: CPT | Performed by: INTERNAL MEDICINE

## 2020-03-11 PROCEDURE — 36415 COLL VENOUS BLD VENIPUNCTURE: CPT | Performed by: PHYSICIAN ASSISTANT

## 2020-03-11 PROCEDURE — 85018 HEMOGLOBIN: CPT | Performed by: INTERNAL MEDICINE

## 2020-03-11 PROCEDURE — 25800030 ZZH RX IP 258 OP 636: Performed by: STUDENT IN AN ORGANIZED HEALTH CARE EDUCATION/TRAINING PROGRAM

## 2020-03-11 PROCEDURE — C9113 INJ PANTOPRAZOLE SODIUM, VIA: HCPCS | Performed by: STUDENT IN AN ORGANIZED HEALTH CARE EDUCATION/TRAINING PROGRAM

## 2020-03-11 PROCEDURE — 36415 COLL VENOUS BLD VENIPUNCTURE: CPT | Performed by: INTERNAL MEDICINE

## 2020-03-11 PROCEDURE — 82040 ASSAY OF SERUM ALBUMIN: CPT | Performed by: PHYSICIAN ASSISTANT

## 2020-03-11 PROCEDURE — 12000000 ZZH R&B MED SURG/OB

## 2020-03-11 PROCEDURE — 25000132 ZZH RX MED GY IP 250 OP 250 PS 637: Mod: GY | Performed by: STUDENT IN AN ORGANIZED HEALTH CARE EDUCATION/TRAINING PROGRAM

## 2020-03-11 PROCEDURE — 25000125 ZZHC RX 250: Performed by: STUDENT IN AN ORGANIZED HEALTH CARE EDUCATION/TRAINING PROGRAM

## 2020-03-11 PROCEDURE — 25000128 H RX IP 250 OP 636: Performed by: STUDENT IN AN ORGANIZED HEALTH CARE EDUCATION/TRAINING PROGRAM

## 2020-03-11 PROCEDURE — 99233 SBSQ HOSP IP/OBS HIGH 50: CPT | Performed by: PSYCHIATRY & NEUROLOGY

## 2020-03-11 RX ORDER — HYDROXYZINE HYDROCHLORIDE 25 MG/1
25-50 TABLET, FILM COATED ORAL EVERY 6 HOURS PRN
Status: DISCONTINUED | OUTPATIENT
Start: 2020-03-11 | End: 2020-04-09 | Stop reason: HOSPADM

## 2020-03-11 RX ADMIN — LACTULOSE 15 ML: 10 SOLUTION ORAL at 08:53

## 2020-03-11 RX ADMIN — CEFTRIAXONE SODIUM 1 G: 1 INJECTION, POWDER, FOR SOLUTION INTRAMUSCULAR; INTRAVENOUS at 08:55

## 2020-03-11 RX ADMIN — DULOXETINE 60 MG: 60 CAPSULE, DELAYED RELEASE ORAL at 08:54

## 2020-03-11 RX ADMIN — HYDROXYZINE HYDROCHLORIDE 25 MG: 25 TABLET, FILM COATED ORAL at 10:29

## 2020-03-11 RX ADMIN — PANTOPRAZOLE SODIUM 40 MG: 40 INJECTION, POWDER, FOR SOLUTION INTRAVENOUS at 08:54

## 2020-03-11 RX ADMIN — TRAZODONE HYDROCHLORIDE 50 MG: 50 TABLET ORAL at 00:26

## 2020-03-11 RX ADMIN — PANTOPRAZOLE SODIUM 40 MG: 40 INJECTION, POWDER, FOR SOLUTION INTRAVENOUS at 20:32

## 2020-03-11 RX ADMIN — TRAZODONE HYDROCHLORIDE 50 MG: 50 TABLET ORAL at 22:52

## 2020-03-11 RX ADMIN — HYDROXYZINE HYDROCHLORIDE 25 MG: 25 TABLET, FILM COATED ORAL at 22:50

## 2020-03-11 RX ADMIN — LACTULOSE 15 ML: 10 SOLUTION ORAL at 20:32

## 2020-03-11 RX ADMIN — HYDROXYZINE HYDROCHLORIDE 25 MG: 25 TABLET, FILM COATED ORAL at 16:21

## 2020-03-11 RX ADMIN — FOLIC ACID: 5 INJECTION, SOLUTION INTRAMUSCULAR; INTRAVENOUS; SUBCUTANEOUS at 13:49

## 2020-03-11 RX ADMIN — MIRTAZAPINE 15 MG: 15 TABLET, FILM COATED ORAL at 22:50

## 2020-03-11 RX ADMIN — SODIUM CHLORIDE, POTASSIUM CHLORIDE, SODIUM LACTATE AND CALCIUM CHLORIDE: 600; 310; 30; 20 INJECTION, SOLUTION INTRAVENOUS at 00:31

## 2020-03-11 ASSESSMENT — ACTIVITIES OF DAILY LIVING (ADL)
ADLS_ACUITY_SCORE: 17

## 2020-03-11 NOTE — CONSULTS
SW Consult Note:    D/I:  SW is following and acknowledges consult for initiating CD commitment.  SW was contacted by [facility] Pre Petition Screening who stated that family had petitioned for commitment.  PPS Worker Clara Webster is going to be coming to the hospital to meet with patient on 3/12.  SW faxed requested documents and called to confirm that they had received and if they needed Exhibit A or if family was completing.  Left VM.    P: SW will remain available to assist as needed.    Brad Clifford, MSW, LGSW  [phone]  [facility]

## 2020-03-11 NOTE — PROGRESS NOTES
DANIELE Note:    D/I:  DANIELE spoke with Cailin with Pre Petitioned Screening with [facility] and updated that physician had completed examiner statement for patient. Requesting paperwork be faxed to [phone].  Faxed Lab work, examiners statement, withdrawal protocol, physician and nursing notes.      DANIELE received a call from Clara Webster with PPS who stated that she would review notes and would likely come out tomorrow to meet with patient.    Brad Clifford, JULIA, LGSW  [phone]  [facility]

## 2020-03-11 NOTE — PROGRESS NOTES
[facility]  Gastroenterology Progress Note     Jim Richard MRN# [id number]   YOB: 1955 Age: 65 year old          Assessment and Plan:     Update: Patient complains of abdominal distention and now more firm. Colonoscopy yesterday noted 2 polyps, diverticulosis and mild congestion. No source of GI bleed. Hemoglobin stable. Tolerating regular diet.    Anemia  Melena  Cirrhosis with ascites and esophageal varices  Abdominal ascites  Patient has had stable hemoglobin. EGD noted to have grade I esophageal varices with no evidence of bleed. Colonoscopy no source of GI bleed. Underwent ultrasound guided paracentesis on 3/9/2020. and had 3 L removed. Awaiting cultures. Currently on ceftriaxone with concerns for SBP. Patient has significant ascites. Abdomen firm.    -  Will need repeat ultrasound guided paracentesis- plan tomorrow  - Continue with regular diet  - Daily CMP, CBC, INR  - Continue with current treatment plan          Gastrointestinal hemorrhage, unspecified gastrointestinal hemorrhage type  Abdominal distension  Other ascite          Gastrointestinal hemorrhage, unspecified gastrointestinal hemorrhage type  Abdominal distension  Other ascites      Interval History:   denies chest pain, denies shortness of breath and has had a bowel movement in the last 24 hours              Review of Systems:   C: NEGATIVE for fever, chills, change in weight  E/M: NEGATIVE for ear, mouth and throat problems  R: NEGATIVE for significant cough or SOB  CV: NEGATIVE for chest pain, palpitations or peripheral edema             Medications:   I have reviewed this patient's current medications    albumin human  12.5 g Intravenous Once     cefTRIAXone  1 g Intravenous Q24H     DULoxetine  60 mg Oral Daily     fluticasone-vilanterol  1 puff Inhalation Daily     lactulose  30 mL Oral BID     lidocaine (buffered or not buffered)  5 mL Intradermal Once     mirtazapine  15 mg Oral At Bedtime      pantoprazole (PROTONIX) IV  40 mg Intravenous BID     polyethylene glycol  238 g Oral Once     sodium chloride (PF)  3 mL Intracatheter Q8H     sodium chloride (PF)  3 mL Intracatheter Q8H     IV Fluid with vitamins   Intravenous Q24H                  Physical Exam:   Vitals were reviewed  Vital Signs with Ranges  Temp:  [97.5  F (36.4  C)-98.5  F (36.9  C)] 98.1  F (36.7  C)  Pulse:  [65-83] 78  Heart Rate:  [65-78] 71  Resp:  [10-38] 15  BP: (110-135)/(54-93) 131/54  SpO2:  [95 %-100 %] 97 %  No intake/output data recorded.  Constitutional: healthy, alert and no distress   Cardiovascular: negative, PMI normal. No lifts, heaves, or thrills. RRR. No murmurs, clicks gallops or rub  Respiratory: negative, Percussion normal. Good diaphragmatic excursion. Lungs clear  Head: Normocephalic. No masses, lesions, tenderness or abnormalities  Neck: Neck supple. No adenopathy. Thyroid symmetric, normal size,, Carotids without bruits.  Abdomen: Abdomen firm and distended, tender. BS normal. No masses, organomegaly           Data:   I reviewed the patient's new clinical lab test results.   Recent Labs   Lab Test 03/11/20  0720 03/09/20  0710 03/08/20  2247  03/08/20  0547  03/07/20  1235  02/24/20  1439  02/07/20  0758   WBC 3.7* 4.3  --   --  4.9  --  10.3   < >  --    < > 3.4*   HGB 8.6* 8.1* 7.9*   < > 7.4*   < > 9.4*   < >  --    < > 8.2*   MCV 93 92  --   --  91  --  92   < >  --    < > 97   PLT 83* 69*  --   --  86*  --  171   < >  --    < > 58*   INR  --   --   --   --   --   --  1.24*  --  1.24*  --  1.36*    < > = values in this interval not displayed.     Recent Labs   Lab Test 03/10/20  0703 03/08/20  0547 03/07/20  1235   POTASSIUM 3.2* 3.7 4.2   CHLORIDE 107 102 103   CO2 23 20 17*   BUN 17 30 34*   ANIONGAP 6 9 15*     Recent Labs   Lab Test 03/10/20  0703 03/08/20  0547 02/07/20  0758  02/03/20  0218  01/11/20  1730  11/13/19  1858 11/13/19  1849  02/13/19  2340  09/25/14  0510   ALBUMIN 2.4* 2.7* 2.2*   < > 2.7*    < >  --    < > 3.0*  --    < > 3.1*   < >  --    BILITOTAL 1.1 2.1* 1.0   < > 1.6*   < >  --    < > 1.0  --    < > 0.4   < >  --    * 78* 19   < > 26   < >  --    < > 24  --    < > 41   < >  --    * 348* 37   < > 61*   < >  --    < > 58*  --    < > 89*   < >  --    PROTEIN  --   --   --   --   --   --  Negative  --   --  30*  --   --   --  Negative   LIPASE  --   --   --   --  369  --   --   --  499*  --   --  536*   < >  --     < > = values in this interval not displayed.       I reviewed the patient's new imaging results.    All laboratory data reviewed  All imaging studies reviewed by me.    Gloria De Leon PA-C,  3/11/2020  [facility]  Office : [phone]  Cell: [phone] (Dr. Shaw)  Cell: [phone] (Gloria De Leon PA-C)

## 2020-03-11 NOTE — PLAN OF CARE
Pt. A/Ox4, forgetful. VSS on RA. Denies pain or nausea. CIWAs 2,2, 2. Up A1 W/GB. Paracentesis site CDI, RLQ.  L leg tibial scab with surrounding erythema and warmth. LR infusing @ 75. Continuing to have watery/loose stools from mag citrate. Abdomen soft, rounded. Discharge pending, possible need for chemical dependency program.

## 2020-03-11 NOTE — PLAN OF CARE
A&Ox4. VSS. Denies pain or nausea. CIWA Score 3. Up A1 W/GB - ambulated in halls multiple times today. Paracentesis site CDI, RLQ.  L leg tibial scab with surrounding erythema and warmth. No stool today. Abdomen distended, firm, rounded. Plan for paracentesis tomorrow. NPO @ 0000.  Atarax given x2 for anxiety; effective. MD placed pt on hold at 0659 this morning. Expires 3/16/2020 @ 0659.

## 2020-03-11 NOTE — PROGRESS NOTES
[facility]    Medicine Progress Note - Hospitalist Service       Date of Admission:  3/7/2020  Assessment & Plan   Jim Richard is a 64 year-old male with history of alcohol dependence, alcoholic cirrhosis with history of varices, depression, anxiety, coronary artery disease, obstructive sleep apnea, peripheral vascular disease, prior subdural hematoma who presents with alcohol intoxication and melena. Admitted 3/7/2020.      Melena  Gastrointestinal bleed, unspecified site  H/o Esophageal varices  Presents with one week hx of melena.  Baseline hemoglobin 9-10 g/dl recent, on admission Hgb 9.4 g/dl. Last EGD 09/2019 showed normal esophagus with portal hypertensive gastropathy, normal duodenum. Mildly tachycardic on admission otherwise blood pressure stable. Suspect slow upper GI bleed.  - GI ([facility]) consulted, EGD did not show any bleeding ulcers or esophageal varices, - colonoscopy 3/10 with diverticulosis, 2 polyps, contested mucusa  - Continue pantoprazole IV BID, consider decrease to daily 3/12  - Avoid anticoagulation  - transfuse <7, pt has been consented  - repeat hgb tonight 3/11 and in the am     Cirrhosis with ascites  Alcoholic hepatitis  PTA lactulose 10 g TID, propranolol 10 mg BID  - Continue lactulose   - Propranolol temporarily on hold to monitor hemodynamics closely with GIB  - Ceftriaxone IV ordered for SBP prophylaxis in setting of GIB, paracentesis does not indicate SBP.  Culture negative as of 3/11  - Hold diuretics in setting of GI bleed, plan to restart 3/12 if stable  - LFT's worsened today (AST/ALT, alk phos). Bilirubin improved. Repeat in the am    Depression with hx of suicidal ideation  Anxiety  PTA duloxetine 60 mg daily, quetiapine 50 mg at bedtime PRN, trazodone 200 mg at bedtime, mirtazapine 15 mg at bedtime.   Poor medication compliance, he has not taken these meds for few weeks now. Psych had seen previous hospitalization. He denies ongoing SI in the ED.   -  Psychiatry has seen, greatly appreciate assistance  - continue duloxetine, mirtazapine, prn trazodone  - pt on 72 hour hold by psychiatry 3/10, chemical dependency commitment completed and pending     Alcohol intoxication  Alcohol use disorder  Drinking up to 1-1.75 L vodka daily. BARNEY on admission at 0.29. Does have hx of DT's, but no h/o withdrawal seizures.   - IV vitamins  - CIWA protocol with lorazepam, none needed since am 9:30a 3/10  - Psychiatry consulted as above  - Chemical dependency consulted , patient had multiple admissions here for similar complaints, chemical dependency treatment was not accepted in the past so they are hesitant to visit with him.     JANIS on CPAP  Continue CPAP per home settings     COPD  Tobacco abuse  Stable  - Continue prior to admission inhalers    Diet: 2 Gram Sodium Diet  Snacks/Supplements Adult: Other; 2pm: straw Boost  (RD); Between Meals    DVT Prophylaxis: Pneumatic Compression Devices  Leger Catheter: not present  Code Status: DNR/DNI      Disposition Plan   Expected discharge: Pending EGD, stable hemoglobin, psychiatry assessment.  Entered: Mark Gomez MD 03/11/2020, 8:40 AM         The patient's care was discussed with the Bedside Nurse and Patient.    Mark Gomez MD  Hospitalist Service  [facility]    ______________________________________________________________________    Interval History   Overnight events reviewed. Doing ok. Told this am he is on 72 hour hold per his report. Denies cp/sob. No abdominal pian    Data reviewed today: I reviewed  All medications, new labs and imaging results over the last 24 hours. I personally reviewed no images or EKG's today.    Physical Exam   Vital Signs: Temp: 98.1  F (36.7  C) Temp src: Oral BP: 131/54 Pulse: 78 Heart Rate: 71 Resp: 15 SpO2: 97 % O2 Device: None (Room air)    Weight: 0 lbs 0 oz    Constitutional: Awake, alert, cooperative, no apparent distress  Respiratory: Clear to auscultation  bilaterally, no crackles or wheezing  Cardiovascular: Regular rate and rhythm, normal S1 and S2, and no murmur noted  GI: Normal bowel sounds, soft, distended, non-tender  Skin/Integumen: No rashes, no cyanosis, trace lower extremity edema noted.  Neuro : moving all 4 extremities, no focal deficit noted       Data   Recent Labs   Lab 03/11/20  0720 03/10/20  0703 03/09/20  0710 03/08/20  2247  03/08/20  0547  03/07/20  1235   WBC 3.7*  --  4.3  --   --  4.9  --  10.3   HGB 8.6*  --  8.1* 7.9*   < > 7.4*   < > 9.4*   MCV 93  --  92  --   --  91  --  92   PLT 83*  --  69*  --   --  86*  --  171   INR  --   --   --   --   --   --   --  1.24*   NA  --  136  --   --   --  131*  --  135   POTASSIUM  --  3.2*  --   --   --  3.7  --  4.2   CHLORIDE  --  107  --   --   --  102  --  103   CO2  --  23  --   --   --  20  --  17*   BUN  --  17  --   --   --  30  --  34*   CR  --  1.00  --   --   --  1.26*  --  1.20   ANIONGAP  --  6  --   --   --  9  --  15*   KEV  --  8.5  --   --   --  8.2*  --  8.4*   GLC  --  98  --   --   --  92  --  70   ALBUMIN  --  2.4*  --   --   --  2.7*  --   --    PROTTOTAL  --  5.7*  --   --   --  6.3*  --   --    BILITOTAL  --  1.1  --   --   --  2.1*  --   --    ALKPHOS  --  170*  --   --   --  194*  --   --    ALT  --  153*  --   --   --  78*  --   --    AST  --  365*  --   --   --  348*  --   --     < > = values in this interval not displayed.       No results found for this or any previous visit (from the past 24 hour(s)).  Medications     lactated ringers 75 mL/hr at 03/11/20 0031     - MEDICATION INSTRUCTIONS -       - MEDICATION INSTRUCTIONS -       - MEDICATION INSTRUCTIONS -         albumin human  12.5 g Intravenous Once     cefTRIAXone  1 g Intravenous Q24H     DULoxetine  60 mg Oral Daily     fluticasone-vilanterol  1 puff Inhalation Daily     lactulose  30 mL Oral BID     lidocaine (buffered or not buffered)  5 mL Intradermal Once     mirtazapine  15 mg Oral At Bedtime     pantoprazole  (PROTONIX) IV  40 mg Intravenous BID     polyethylene glycol  238 g Oral Once     sodium chloride (PF)  3 mL Intracatheter Q8H     sodium chloride (PF)  3 mL Intracatheter Q8H     IV Fluid with vitamins   Intravenous Q24H

## 2020-03-11 NOTE — CONSULTS
"[facility] Psychiatric Consult Progress Note    Interval History:   Pt seen, chart reviewed, case discussed with nursing staff and treating clinicians.  I met with Jim on station 88 this morning.  We discussed his situation, he continues to oppose the notion that he should go to [facility] for treatment.  He asked about discharging home.  We discussed concerns about the grave consequences of his drinking, failing health, countless admissions to the inpatient medical service here at [facility] within the last several months.  At this point he has a very poor judgment, he is oriented, he is able to ambulate, his cognition appears to be improving compared to when he was admitted though he was intoxicated at that time.  I explained to Jim that I am going to place him on a 72-hour hold, initiate a commitment process so that the [facility] can screen his case to determine an appropriate disposition.  He appears to be an imminent risk to himself, unable to care for himself outside of the hospital secondary to his severe alcohol use disorder and escalating medical problems.  Psychiatrically he complains of some sleep issues but he is pleasant, calm, not reporting thoughts of self-harm.  He had difficulty sleeping last night because his IV was \"beeping\"     Review of systems:   10 point Review of Systems completed by Dr. Davis, and is  is negative other than noted in the HPI     Medications:       albumin human  12.5 g Intravenous Once     cefTRIAXone  1 g Intravenous Q24H     DULoxetine  60 mg Oral Daily     fluticasone-vilanterol  1 puff Inhalation Daily     lactulose  30 mL Oral BID     lidocaine (buffered or not buffered)  5 mL Intradermal Once     mirtazapine  15 mg Oral At Bedtime     pantoprazole (PROTONIX) IV  40 mg Intravenous BID     polyethylene glycol  238 g Oral Once     sodium chloride (PF)  3 mL Intracatheter Q8H     sodium chloride (PF)  3 mL Intracatheter Q8H     IV Fluid with " vitamins   Intravenous Q24H     acetaminophen, glucose **OR** dextrose **OR** glucagon, lidocaine 4%, lidocaine (buffered or not buffered), lidocaine (buffered or not buffered), LORazepam **OR** LORazepam, - MEDICATION INSTRUCTIONS -, - MEDICATION INSTRUCTIONS -, - MEDICATION INSTRUCTIONS -, melatonin, miconazole, naloxone, naloxone, ondansetron **OR** ondansetron, prochlorperazine **OR** prochlorperazine **OR** prochlorperazine, QUEtiapine, sodium chloride (PF), sodium chloride (PF), sodium chloride (PF), sodium chloride (PF), traZODone    Mental Status Examination:     Appearance:  awake, alert, dressed in hospital scrubs, appeared older than stated age, poorly groomed and cooperative  Eye Contact:  good  Speech:  clear, coherent  Language:Normal  Psychomotor Behavior:  no evidence of tardive dyskinesia, dystonia, or tics  Mood:  good  Affect:  intensity is flat  Thought Process:  logical, linear and goal oriented no loose associations  Thought Content:  no evidence of suicidal ideation or homicidal ideation and no evidence of psychotic thought  Oriented to:  time, person, and place  Attention Span and Concentration:  fair  Recent and Remote Memory:  intact  Fund of Knowledge: appropriate  Muscle Strength and Tone: weak  Gait and Station: Normal  Insight:  limited  Judgment:  poor        Labs/Vitals:     Recent Results (from the past 24 hour(s))   Comprehensive metabolic panel    Collection Time: 03/10/20  7:03 AM   Result Value Ref Range    Sodium 136 133 - 144 mmol/L    Potassium 3.2 (L) 3.4 - 5.3 mmol/L    Chloride 107 94 - 109 mmol/L    Carbon Dioxide 23 20 - 32 mmol/L    Anion Gap 6 3 - 14 mmol/L    Glucose 98 70 - 99 mg/dL    Urea Nitrogen 17 7 - 30 mg/dL    Creatinine 1.00 0.66 - 1.25 mg/dL    GFR Estimate 79 >60 mL/min/[1.73_m2]    GFR Estimate If Black >90 >60 mL/min/[1.73_m2]    Calcium 8.5 8.5 - 10.1 mg/dL    Bilirubin Total 1.1 0.2 - 1.3 mg/dL    Albumin 2.4 (L) 3.4 - 5.0 g/dL    Protein Total 5.7 (L)  6.8 - 8.8 g/dL    Alkaline Phosphatase 170 (H) 40 - 150 U/L     (H) 0 - 70 U/L     (H) 0 - 45 U/L   COLONOSCOPY    Collection Time: 03/10/20  2:55 PM   Result Value Ref Range    COLONOSCOPY       [facility] Endoscopy Department  _______________________________________________________________________________  Patient Name: Jim Richard        Procedure Date: 3/10/2020 2:55 PM  MRN: [id number]                       Account Number: [id number]  YOB: 1955               Admit Type: Inpatient  Age: 65                               Room: 2  Note Status: Finalized                Attending MD: Rosendo Shaw MD  Total Sedation Time:                  Instrument Name: [id number]   Colonoscope  _______________________________________________________________________________     Procedure:                Colonoscopy  Indications:              Melena, Iron deficiency anemia  Providers:                Rosendo Shaw MD, Cynthia Garcia RN  Referring MD:               Medicines:                Fentanyl 100 micrograms IV, Midazolam 4 mg IV  Complications:            No immediate complications.  ________________________________________________________ _______________________  Procedure:                Pre-Anesthesia Assessment:                            - Prior to the procedure, a History and Physical                             was performed, and patient medications and                             allergies were reviewed. The patient is competent.                             The risks and benefits of the procedure and the                             sedation options and risks were discussed with the                             patient. All questions were answered and informed                             consent was obtained. Patient identification and                             proposed procedure were verified by the physician                             in the  pre-procedure area. Mental Status                             Examination: alert and oriented. Airway                             Examination: normal oropharyngeal airway and neck                             mobility. Respiratory Examination: clear to                              auscultation. CV Examination: normal. Prophylactic                             Antibiotics: The patient does not require                             prophylactic antibiotics. Prior Anticoagulants: The                             patient has taken no previous anticoagulant or                             antiplatelet agents. ASA Grade Assessment: II - A                             patient with mild systemic disease. After reviewing                             the risks and benefits, the patient was deemed in                             satisfactory condition to undergo the procedure.                             The anesthesia plan was to use moderate sedation /                             analgesia (conscious sedation). Immediately prior                             to administration of medications, the patient was                             re-assessed for adequacy to receive sedatives. The                             heart rate, respiratory rate, oxygen saturations,                              blood pressure, adequacy of pulmonary ventilation,                             and response to care were monitored throughout the                             procedure. The physical status of the patient was                             re-assessed after the procedure.                            After obtaining informed consent, the colonoscope                             was passed under direct vision. Throughout the                             procedure, the patient's blood pressure, pulse, and                             oxygen saturations were monitored continuously. The                             Colonoscope was introduced through the anus and                              advanced to the terminal ileum.                                                                                   Findings:       The perianal and digital rectal examinations were normal.       A 8 mm polyp was found in the sigmoid colon. The polyp was sessile.       A 5 mm polyp was found in the r ectum. The polyp was sessile.       Multiple small and large-mouthed diverticula were found in the sigmoid        colon and descending colon.       An area of grossly congested mucosa was found in the entire colon.       The terminal ileum appeared normal.                                                                                   Moderate Sedation:       Moderate (conscious) sedation was administered by the endoscopy nurse        and supervised by the endoscopist. The following parameters were        monitored: oxygen saturation, heart rate, blood pressure, and response        to care. Total physician intraservice time was 15 minutes.  Impression:               - One 8 mm polyp in the sigmoid colon.                            - One 5 mm polyp in the rectum.                            - Diverticulosis in the sigmoid colon and in the                             descending colon.                            - Congested mucosa in the entire examined colon.                             - The examined portion of the ileum was normal.                            - No specimens collected.  Recommendation:           - Please continue to follow with Primary care                             Physician.                            - Return patient to hospital basilio.                            - Resume regular diet.                                                                                   Procedure Code(s):       --- Professional ---       01376, Colonoscopy, flexible; diagnostic, including collection of        specimen(s) by brushing or washing, when performed (separate procedure)       ,  Moderate sedation services provided by the same physician or        other qualified health care professional performing a gastrointestinal        endoscopic service that sedation supports, requiring the presence of an        independent trained observer to assist in the monitoring of the        patient's level of consciousness and physiological status; initial 15        minutes of  intra-service time; patient age 5 years or older (additional        time may be reported with 26589, as appropriate)  Diagnosis Code(s):       --- Professional ---       D12.5, Benign neoplasm of sigmoid colon       K62.1, Rectal polyp       K63.89, Other specified diseases of intestine       K92.1, Melena (includes Hematochezia)       D50.9, Iron deficiency anemia, unspecified       K57.30, Diverticulosis of large intestine without perforation or abscess        without bleeding    CPT copyright 2018 [employer]. All rights reserved.    The codes documented in this report are preliminary and upon  review may   be revised to meet current compliance requirements.    Electronically Signed by Rosendo Ag  __________________  Rosendo Shaw MD  3/10/2020 4:00:32 PM  I was physically present for the entire viewing portion of the exam.  Rosendo Shaw MD  Number of Addenda: 0    Note Initiated On: 3/10/2020 2:55 PM  MRN:                      [id number]  Procedure Ermias e:           3/10/2020 2:55:04 PM  Total Procedure Duration: 0 hours 6 minutes 54 seconds   Estimated Blood Loss:       Scope In: 3:26:38 PM  Scope Out: 3:33:32 PM       B/P: 133/62, T: 98.4, P: 65, R: 15    Impression:   Jim presents with intractable alcohol dependence and multiple inpatient medical admissions related to his drinking.  He is unable to care for himself, talked about wanting to discharge home today.  He has very poor insight, he will be placed on a 72-hour hold and a commitment process will be initiated.  Given insurance limitations it  appears that he needs a hospital-based program followed by residential/[facility] house support as he does not appear able to live independently.      DIagnoses:   1.  Alcohol use disorder, severe  2.  Major depressive disorder recurrent, moderate severity  3.  Unspecified anxiety disorder  4.  Acute alcohol intoxication and multiple medical comorbidities related to severe alcohol use         Plan:   1. Written information given on medications. Side effects, risks, benefits reviewed.  2.  Continue current psychotropic meds  3.  Initiate 72-hour hold  4.  Support statement for chemical dependency commitment completed and is on the front of the chart      Attestation:  Patient has been seen and evaluated by me,  Donell Davis MD

## 2020-03-12 ENCOUNTER — APPOINTMENT (OUTPATIENT)
Dept: ULTRASOUND IMAGING | Facility: CLINIC | Age: 65
DRG: 392 | End: 2020-03-12
Attending: PHYSICIAN ASSISTANT
Payer: MEDICARE

## 2020-03-12 LAB
ALBUMIN FLD-MCNC: 0.6 G/DL
ALBUMIN SERPL-MCNC: 2.2 G/DL (ref 3.4–5)
ALP SERPL-CCNC: 154 U/L (ref 40–150)
ALT SERPL W P-5'-P-CCNC: 95 U/L (ref 0–70)
ANION GAP SERPL CALCULATED.3IONS-SCNC: 8 MMOL/L (ref 3–14)
AST SERPL W P-5'-P-CCNC: 91 U/L (ref 0–45)
BILIRUB SERPL-MCNC: 0.8 MG/DL (ref 0.2–1.3)
BUN SERPL-MCNC: 15 MG/DL (ref 7–30)
CALCIUM SERPL-MCNC: 7.9 MG/DL (ref 8.5–10.1)
CHLORIDE SERPL-SCNC: 108 MMOL/L (ref 94–109)
CO2 SERPL-SCNC: 20 MMOL/L (ref 20–32)
CREAT SERPL-MCNC: 1.04 MG/DL (ref 0.66–1.25)
GFR SERPL CREATININE-BSD FRML MDRD: 74 ML/MIN/{1.73_M2}
GLUCOSE SERPL-MCNC: 99 MG/DL (ref 70–99)
POTASSIUM SERPL-SCNC: 3.3 MMOL/L (ref 3.4–5.3)
PROT FLD-MCNC: 1.5 G/DL
PROT SERPL-MCNC: 5.5 G/DL (ref 6.8–8.8)
SODIUM SERPL-SCNC: 136 MMOL/L (ref 133–144)
SPECIMEN SOURCE FLD: NORMAL
SPECIMEN SOURCE FLD: NORMAL

## 2020-03-12 PROCEDURE — 49083 ABD PARACENTESIS W/IMAGING: CPT

## 2020-03-12 PROCEDURE — C9113 INJ PANTOPRAZOLE SODIUM, VIA: HCPCS | Performed by: STUDENT IN AN ORGANIZED HEALTH CARE EDUCATION/TRAINING PROGRAM

## 2020-03-12 PROCEDURE — 25000132 ZZH RX MED GY IP 250 OP 250 PS 637: Mod: GY | Performed by: STUDENT IN AN ORGANIZED HEALTH CARE EDUCATION/TRAINING PROGRAM

## 2020-03-12 PROCEDURE — 12000000 ZZH R&B MED SURG/OB

## 2020-03-12 PROCEDURE — 25000128 H RX IP 250 OP 636: Performed by: INTERNAL MEDICINE

## 2020-03-12 PROCEDURE — 0W9G3ZZ DRAINAGE OF PERITONEAL CAVITY, PERCUTANEOUS APPROACH: ICD-10-PCS | Performed by: RADIOLOGY

## 2020-03-12 PROCEDURE — 82042 OTHER SOURCE ALBUMIN QUAN EA: CPT | Performed by: PHYSICIAN ASSISTANT

## 2020-03-12 PROCEDURE — 40000863 ZZH STATISTIC RADIOLOGY XRAY, US, CT, MAR, NM

## 2020-03-12 PROCEDURE — 25000132 ZZH RX MED GY IP 250 OP 250 PS 637: Mod: GY | Performed by: INTERNAL MEDICINE

## 2020-03-12 PROCEDURE — 25000128 H RX IP 250 OP 636: Performed by: STUDENT IN AN ORGANIZED HEALTH CARE EDUCATION/TRAINING PROGRAM

## 2020-03-12 PROCEDURE — 99232 SBSQ HOSP IP/OBS MODERATE 35: CPT | Performed by: INTERNAL MEDICINE

## 2020-03-12 PROCEDURE — 25000125 ZZHC RX 250: Performed by: RADIOLOGY

## 2020-03-12 PROCEDURE — 25800030 ZZH RX IP 258 OP 636: Performed by: STUDENT IN AN ORGANIZED HEALTH CARE EDUCATION/TRAINING PROGRAM

## 2020-03-12 PROCEDURE — 36415 COLL VENOUS BLD VENIPUNCTURE: CPT | Performed by: INTERNAL MEDICINE

## 2020-03-12 PROCEDURE — 84157 ASSAY OF PROTEIN OTHER: CPT | Performed by: PHYSICIAN ASSISTANT

## 2020-03-12 PROCEDURE — 80053 COMPREHEN METABOLIC PANEL: CPT | Performed by: INTERNAL MEDICINE

## 2020-03-12 RX ORDER — SPIRONOLACTONE 25 MG/1
50 TABLET ORAL DAILY
Status: DISCONTINUED | OUTPATIENT
Start: 2020-03-12 | End: 2020-03-15

## 2020-03-12 RX ORDER — LIDOCAINE HYDROCHLORIDE 10 MG/ML
10 INJECTION, SOLUTION EPIDURAL; INFILTRATION; INTRACAUDAL; PERINEURAL ONCE
Status: COMPLETED | OUTPATIENT
Start: 2020-03-12 | End: 2020-03-12

## 2020-03-12 RX ORDER — FUROSEMIDE 20 MG
20 TABLET ORAL DAILY
Status: DISCONTINUED | OUTPATIENT
Start: 2020-03-12 | End: 2020-03-15

## 2020-03-12 RX ORDER — ALBUMIN (HUMAN) 12.5 G/50ML
12.5 SOLUTION INTRAVENOUS ONCE
Status: DISCONTINUED | OUTPATIENT
Start: 2020-03-12 | End: 2020-03-14

## 2020-03-12 RX ORDER — DEXTROSE MONOHYDRATE 25 G/50ML
25-50 INJECTION, SOLUTION INTRAVENOUS
Status: DISCONTINUED | OUTPATIENT
Start: 2020-03-12 | End: 2020-04-07

## 2020-03-12 RX ORDER — NICOTINE POLACRILEX 4 MG
15-30 LOZENGE BUCCAL
Status: DISCONTINUED | OUTPATIENT
Start: 2020-03-12 | End: 2020-04-07

## 2020-03-12 RX ADMIN — PANTOPRAZOLE SODIUM 40 MG: 40 INJECTION, POWDER, FOR SOLUTION INTRAVENOUS at 08:49

## 2020-03-12 RX ADMIN — CEFTRIAXONE SODIUM 1 G: 1 INJECTION, POWDER, FOR SOLUTION INTRAMUSCULAR; INTRAVENOUS at 08:49

## 2020-03-12 RX ADMIN — LACTULOSE 15 ML: 10 SOLUTION ORAL at 21:18

## 2020-03-12 RX ADMIN — HYDROXYZINE HYDROCHLORIDE 25 MG: 25 TABLET, FILM COATED ORAL at 22:58

## 2020-03-12 RX ADMIN — SODIUM CHLORIDE, POTASSIUM CHLORIDE, SODIUM LACTATE AND CALCIUM CHLORIDE: 600; 310; 30; 20 INJECTION, SOLUTION INTRAVENOUS at 00:00

## 2020-03-12 RX ADMIN — SPIRONOLACTONE 50 MG: 25 TABLET ORAL at 13:32

## 2020-03-12 RX ADMIN — PANTOPRAZOLE SODIUM 40 MG: 40 INJECTION, POWDER, FOR SOLUTION INTRAVENOUS at 21:18

## 2020-03-12 RX ADMIN — FUROSEMIDE 20 MG: 20 TABLET ORAL at 13:31

## 2020-03-12 RX ADMIN — DULOXETINE 60 MG: 60 CAPSULE, DELAYED RELEASE ORAL at 08:49

## 2020-03-12 RX ADMIN — HYDROXYZINE HYDROCHLORIDE 25 MG: 25 TABLET, FILM COATED ORAL at 17:30

## 2020-03-12 RX ADMIN — LIDOCAINE HYDROCHLORIDE 10 ML: 10 INJECTION, SOLUTION EPIDURAL; INFILTRATION; INTRACAUDAL; PERINEURAL at 12:37

## 2020-03-12 RX ADMIN — MIRTAZAPINE 15 MG: 15 TABLET, FILM COATED ORAL at 22:57

## 2020-03-12 RX ADMIN — LACTULOSE 15 ML: 10 SOLUTION ORAL at 08:49

## 2020-03-12 RX ADMIN — HYDROXYZINE HYDROCHLORIDE 25 MG: 25 TABLET, FILM COATED ORAL at 10:38

## 2020-03-12 RX ADMIN — TRAZODONE HYDROCHLORIDE 50 MG: 50 TABLET ORAL at 22:57

## 2020-03-12 ASSESSMENT — ACTIVITIES OF DAILY LIVING (ADL)
ADLS_ACUITY_SCORE: 17
ADLS_ACUITY_SCORE: 15
ADLS_ACUITY_SCORE: 17
ADLS_ACUITY_SCORE: 15
ADLS_ACUITY_SCORE: 17
ADLS_ACUITY_SCORE: 17

## 2020-03-12 NOTE — PROGRESS NOTES
Pre procedure plan of care reviewed with pt who appears to accept and understand.  No questions at this time. Pt has had procedure in the past.

## 2020-03-12 NOTE — PROGRESS NOTES
4000 ml fluid drained from right side.  Pt tolerated procedure well. Denies pain at this time. Will arrange for transport back to inpatient  unit via cart with radiology transport. No leaking or bleeding from site. Ultrasound tech will add dermabond to old site that continues to leak from last paracentesis.

## 2020-03-12 NOTE — PROGRESS NOTES
DANIELE Note:    D/I:  DANIELE received a call back from Joya with Pre Petitioned Screening who stated that hospital would not need to provide exhibit A if we were not petitioning for commitment.  She stated that the  will complete that document if the family is petitioning for commitment.     Addendum: DANIELE received call from Clara JUAREZ With Pre petition Screening ([phone]) who stated that the petition is going to be supported and patient will have a court hold when 72 hour hold expires.  SW to call  office tomorrow to follow up ([phone]).      P: SW will continue to assist and follow for discharge.    JULIA Navarro, LGSW  [phone]  [facility]

## 2020-03-12 NOTE — PLAN OF CARE
Pt. A/Ox4. VSS on RA. Denies pain/nausea. CIWAs 3,3,2,2. PRN atarax also available. Up A1 W/GB. Large BM. Paracentesis 3/12, NPO at midnight. L tibial scab, warm with surrounding erythema. Fissure on coccyx, pink, AGUILA. On 72 hr hold, ends 3/16/2020 @ 0659. Discharge pending county consult.

## 2020-03-12 NOTE — PLAN OF CARE
A&Ox4. VSS. Denies pain/nausea. CIWAs 3,3. PRN atarax given x1; effective. Up to BR A1+GB&W. Paracentesis done today. L tibial scab, warm with surrounding erythema. Fissure on coccyx, pink, AGUILA. Antifungal applied. Lg loose BM today. On 72 hr hold, ends 3/16/2020 @ 0659. Discharge pending [facility] consult.

## 2020-03-12 NOTE — PROGRESS NOTES
[facility]    Medicine Progress Note - Hospitalist Service       Date of Admission:  3/7/2020  Assessment & Plan   Jim Richard is a 64 year-old male with history of alcohol dependence, alcoholic cirrhosis with history of varices, depression, anxiety, coronary artery disease, obstructive sleep apnea, peripheral vascular disease, prior subdural hematoma who presents with alcohol intoxication and melena. Admitted 3/7/2020.      Melena  Gastrointestinal bleed, unspecified site  H/o Esophageal varices  Presents with one week hx of melena.  Baseline hemoglobin 9-10 g/dl recent, on admission Hgb 9.4 g/dl. Last EGD 09/2019 showed normal esophagus with portal hypertensive gastropathy, normal duodenum. Mildly tachycardic on admission otherwise blood pressure stable. Suspected slow upper GI bleed.  - GI ([facility]) consulted, EGD did not show any bleeding ulcers or esophageal varices, - colonoscopy 3/10 with diverticulosis, 2 polyps, congested mucusa  - Continue pantoprazole IV BID as per GI  - Avoid anticoagulation  - transfuse <7, pt has been consented  - hgb stable 3/12     Cirrhosis with ascites  Alcoholic hepatitis  PTA lactulose 10 g TID, propranolol 10 mg BID  - Continue lactulose, titrate to 2 stools/day  - will restart PTA propranolol 3/12  - Ceftriaxone IV ordered for SBP prophylaxis in setting of GIB, paracentesis does not indicate SBP.  Culture negative as of 3/11. 5 day course through 3/13  - restart lasix 20 mg daily, aldactone 50 mg daily  - LFT's improved 3/12  - s/p paracentesis with 4L out, cultures pending    Depression with hx of suicidal ideation  Anxiety  PTA duloxetine 60 mg daily, quetiapine 50 mg at bedtime PRN, trazodone 200 mg at bedtime, mirtazapine 15 mg at bedtime.   Poor medication compliance, he has not taken these meds for few weeks now. Psych had seen previous hospitalization. He denies ongoing SI in the ED.   - Psychiatry has seen, greatly appreciate assistance  -  continue duloxetine, mirtazapine, prn trazodone  - prn atarax for anxiety  - pt on 72 hour hold by psychiatry 3/10, chemical dependency commitment completed and pending     Alcohol intoxication  Alcohol use disorder  Drinking up to 1-1.75 L vodka daily. BARNEY on admission at 0.29. Does have hx of DT's, but no h/o withdrawal seizures.   - IV vitamins  - CIWA protocol with lorazepam, none needed since am 9:30a 3/10  - Psychiatry consulted as above  - Chemical dependency has seen. Most recent evaluation 2/13, still valid for 30 days. Possibility of [place]'s program for treatment     JANIS on CPAP  Continue CPAP per home settings     COPD  Tobacco abuse  Stable  - Continue prior to admission inhalers    Diet: Snacks/Supplements Adult: Other; 2pm: straw Boost  (RD); Between Meals  NPO for Medical/Clinical Reasons Except for: Meds    DVT Prophylaxis: Pneumatic Compression Devices  Leger Catheter: not present  Code Status: DNR/DNI      Disposition Plan   Expected discharge: on hold, pending commitment process  Entered: Mark Gomez MD 03/12/2020, 11:55 AM     The patient's care was discussed with the Bedside Nurse and Patient.    Mark Gomez M.D.  Hospitalist  Pager [phone]  Text Page    ______________________________________________________________________    Interval History   Overnight events reviewed. Doing ok. S/p paracentesis of 4L. Denies abdominal pain. No cp/sob    Data reviewed today: I reviewed  All medications, new labs and imaging results over the last 24 hours. I personally reviewed no images or EKG's today.    Physical Exam   Vital Signs: Temp: 98.1  F (36.7  C) Temp src: Oral BP: (!) 146/60 Pulse: 65 Heart Rate: 78 Resp: 16 SpO2: 97 % O2 Device: None (Room air)    Weight: 0 lbs 0 oz    Constitutional: Awake, alert, cooperative, no apparent distress  Respiratory: Clear to auscultation bilaterally, no crackles or wheezing  Cardiovascular: Regular rate and rhythm, normal S1 and S2, and no murmur  noted  GI: Normal bowel sounds, soft, distended, non-tender  Skin/Integumen: No rashes, no cyanosis, trace lower extremity edema noted.  Neuro : moving all 4 extremities, no focal deficit noted       Data   Recent Labs   Lab 03/12/20  0720 03/11/20  2056 03/11/20  0720 03/10/20  0703 03/09/20  0710  03/08/20  0547  03/07/20  1235   WBC  --   --  3.7*  --  4.3  --  4.9  --  10.3   HGB  --  8.0* 8.6*  --  8.1*   < > 7.4*   < > 9.4*   MCV  --   --  93  --  92  --  91  --  92   PLT  --   --  83*  --  69*  --  86*  --  171   INR  --   --   --   --   --   --   --   --  1.24*     --   --  136  --   --  131*  --  135   POTASSIUM 3.3*  --   --  3.2*  --   --  3.7  --  4.2   CHLORIDE 108  --   --  107  --   --  102  --  103   CO2 20  --   --  23  --   --  20  --  17*   BUN 15  --   --  17  --   --  30  --  34*   CR 1.04  --   --  1.00  --   --  1.26*  --  1.20   ANIONGAP 8  --   --  6  --   --  9  --  15*   KEV 7.9*  --   --  8.5  --   --  8.2*  --  8.4*   GLC 99  --   --  98  --   --  92  --  70   ALBUMIN 2.2*  --  2.5* 2.4*  --   --  2.7*   < >  --    PROTTOTAL 5.5*  --   --  5.7*  --   --  6.3*   < >  --    BILITOTAL 0.8  --   --  1.1  --   --  2.1*   < >  --    ALKPHOS 154*  --   --  170*  --   --  194*   < >  --    ALT 95*  --   --  153*  --   --  78*   < >  --    AST 91*  --   --  365*  --   --  348*   < >  --     < > = values in this interval not displayed.       No results found for this or any previous visit (from the past 24 hour(s)).  Medications     lactated ringers 75 mL/hr at 03/12/20 0000     - MEDICATION INSTRUCTIONS -       - MEDICATION INSTRUCTIONS -       - MEDICATION INSTRUCTIONS -         albumin human  12.5 g Intravenous Once     cefTRIAXone  1 g Intravenous Q24H     DULoxetine  60 mg Oral Daily     fluticasone-vilanterol  1 puff Inhalation Daily     lactulose  30 mL Oral BID     lidocaine (buffered or not buffered)  5 mL Intradermal Once     mirtazapine  15 mg Oral At Bedtime     pantoprazole  (PROTONIX) IV  40 mg Intravenous BID     polyethylene glycol  238 g Oral Once     sodium chloride (PF)  3 mL Intracatheter Q8H     sodium chloride (PF)  3 mL Intracatheter Q8H

## 2020-03-12 NOTE — PROGRESS NOTES
Care Suites Procedure Nursing Note    Patient Information  Name: Jim Richard  Age: 65 year old    Procedure  Procedure: paracentesis  Procedure start time: 1230  Procedure complete time: pending  Concerns/abnormal assessment: none  If abnormal assessment, provider notified: N/A  Plan/Other: per procedural plan of care    Sully Carpenter RN

## 2020-03-12 NOTE — PROCEDURES
RADIOLOGY POST PROCEDURE NOTE WITH SEDATION  Patient name: Jim Richard  MRN: [id number]  : 1955    Pre-procedure diagnosis: Ascites  Post-procedure diagnosis: Same    Procedure Date/Time: 2020  1:10 PM    Procedure: Paracentesis  Estimated blood loss: None  Sedation: Moderate sedation was employed. Local lidocaine only    I determined this patient to be an appropriate candidate for the planned sedation and procedure and reassessed the patient IMMEDIATELY PRIOR to sedation and procedure.     The patient tolerated the procedure well with no immediate complications.      Significant findings:none    See imaging dictation for procedural details.    Provider name: Jude Mcfadden MD  Assistant(s):None

## 2020-03-13 LAB
ANION GAP SERPL CALCULATED.3IONS-SCNC: 7 MMOL/L (ref 3–14)
BASOPHILS # BLD AUTO: 0 10E9/L (ref 0–0.2)
BASOPHILS NFR BLD AUTO: 0.7 %
BUN SERPL-MCNC: 12 MG/DL (ref 7–30)
CALCIUM SERPL-MCNC: 8.2 MG/DL (ref 8.5–10.1)
CHLORIDE SERPL-SCNC: 106 MMOL/L (ref 94–109)
CO2 SERPL-SCNC: 22 MMOL/L (ref 20–32)
CREAT SERPL-MCNC: 1.21 MG/DL (ref 0.66–1.25)
DIFFERENTIAL METHOD BLD: ABNORMAL
EOSINOPHIL # BLD AUTO: 0.2 10E9/L (ref 0–0.7)
EOSINOPHIL NFR BLD AUTO: 5.2 %
ERYTHROCYTE [DISTWIDTH] IN BLOOD BY AUTOMATED COUNT: 14.9 % (ref 10–15)
GFR SERPL CREATININE-BSD FRML MDRD: 62 ML/MIN/{1.73_M2}
GLUCOSE SERPL-MCNC: 94 MG/DL (ref 70–99)
HCT VFR BLD AUTO: 27.5 % (ref 40–53)
HGB BLD-MCNC: 9.1 G/DL (ref 13.3–17.7)
IMM GRANULOCYTES # BLD: 0 10E9/L (ref 0–0.4)
IMM GRANULOCYTES NFR BLD: 0.2 %
LYMPHOCYTES # BLD AUTO: 0.6 10E9/L (ref 0.8–5.3)
LYMPHOCYTES NFR BLD AUTO: 14 %
MCH RBC QN AUTO: 30.7 PG (ref 26.5–33)
MCHC RBC AUTO-ENTMCNC: 33.1 G/DL (ref 31.5–36.5)
MCV RBC AUTO: 93 FL (ref 78–100)
MONOCYTES # BLD AUTO: 0.6 10E9/L (ref 0–1.3)
MONOCYTES NFR BLD AUTO: 15.5 %
NEUTROPHILS # BLD AUTO: 2.6 10E9/L (ref 1.6–8.3)
NEUTROPHILS NFR BLD AUTO: 64.4 %
PLATELET # BLD AUTO: 114 10E9/L (ref 150–450)
POTASSIUM SERPL-SCNC: 3.4 MMOL/L (ref 3.4–5.3)
RBC # BLD AUTO: 2.96 10E12/L (ref 4.4–5.9)
SODIUM SERPL-SCNC: 135 MMOL/L (ref 133–144)
WBC # BLD AUTO: 4.1 10E9/L (ref 4–11)

## 2020-03-13 PROCEDURE — 36415 COLL VENOUS BLD VENIPUNCTURE: CPT | Performed by: INTERNAL MEDICINE

## 2020-03-13 PROCEDURE — 25000132 ZZH RX MED GY IP 250 OP 250 PS 637: Mod: GY | Performed by: INTERNAL MEDICINE

## 2020-03-13 PROCEDURE — 85025 COMPLETE CBC W/AUTO DIFF WBC: CPT | Performed by: INTERNAL MEDICINE

## 2020-03-13 PROCEDURE — 99232 SBSQ HOSP IP/OBS MODERATE 35: CPT | Performed by: INTERNAL MEDICINE

## 2020-03-13 PROCEDURE — 25000132 ZZH RX MED GY IP 250 OP 250 PS 637: Mod: GY | Performed by: STUDENT IN AN ORGANIZED HEALTH CARE EDUCATION/TRAINING PROGRAM

## 2020-03-13 PROCEDURE — 12000000 ZZH R&B MED SURG/OB

## 2020-03-13 PROCEDURE — 25000128 H RX IP 250 OP 636: Performed by: INTERNAL MEDICINE

## 2020-03-13 PROCEDURE — C9113 INJ PANTOPRAZOLE SODIUM, VIA: HCPCS | Performed by: STUDENT IN AN ORGANIZED HEALTH CARE EDUCATION/TRAINING PROGRAM

## 2020-03-13 PROCEDURE — 80048 BASIC METABOLIC PNL TOTAL CA: CPT | Performed by: INTERNAL MEDICINE

## 2020-03-13 PROCEDURE — 25000128 H RX IP 250 OP 636: Performed by: STUDENT IN AN ORGANIZED HEALTH CARE EDUCATION/TRAINING PROGRAM

## 2020-03-13 RX ADMIN — ACETAMINOPHEN 650 MG: 325 TABLET, FILM COATED ORAL at 20:34

## 2020-03-13 RX ADMIN — FUROSEMIDE 20 MG: 20 TABLET ORAL at 08:48

## 2020-03-13 RX ADMIN — HYDROXYZINE HYDROCHLORIDE 25 MG: 25 TABLET, FILM COATED ORAL at 10:30

## 2020-03-13 RX ADMIN — HYDROXYZINE HYDROCHLORIDE 25 MG: 25 TABLET, FILM COATED ORAL at 23:15

## 2020-03-13 RX ADMIN — SPIRONOLACTONE 50 MG: 25 TABLET ORAL at 08:48

## 2020-03-13 RX ADMIN — LACTULOSE 15 ML: 10 SOLUTION ORAL at 20:34

## 2020-03-13 RX ADMIN — TRAZODONE HYDROCHLORIDE 50 MG: 50 TABLET ORAL at 22:18

## 2020-03-13 RX ADMIN — DULOXETINE 60 MG: 60 CAPSULE, DELAYED RELEASE ORAL at 08:48

## 2020-03-13 RX ADMIN — HYDROXYZINE HYDROCHLORIDE 25 MG: 25 TABLET, FILM COATED ORAL at 16:58

## 2020-03-13 RX ADMIN — LACTULOSE 15 ML: 10 SOLUTION ORAL at 08:49

## 2020-03-13 RX ADMIN — FLUTICASONE FUROATE AND VILANTEROL TRIFENATATE 1 PUFF: 200; 25 POWDER RESPIRATORY (INHALATION) at 08:48

## 2020-03-13 RX ADMIN — PANTOPRAZOLE SODIUM 40 MG: 40 INJECTION, POWDER, FOR SOLUTION INTRAVENOUS at 20:34

## 2020-03-13 RX ADMIN — PANTOPRAZOLE SODIUM 40 MG: 40 INJECTION, POWDER, FOR SOLUTION INTRAVENOUS at 08:48

## 2020-03-13 RX ADMIN — MIRTAZAPINE 15 MG: 15 TABLET, FILM COATED ORAL at 22:18

## 2020-03-13 RX ADMIN — CEFTRIAXONE SODIUM 1 G: 1 INJECTION, POWDER, FOR SOLUTION INTRAMUSCULAR; INTRAVENOUS at 10:00

## 2020-03-13 ASSESSMENT — ACTIVITIES OF DAILY LIVING (ADL)
ADLS_ACUITY_SCORE: 17

## 2020-03-13 NOTE — PLAN OF CARE
Pt. A/Ox4. VSS on RA. Denies pain/nausea. CIWAs 2,2,2,2,. PRN atarax given x1, effective. Up Ind in room, SBA in halls. Ambulated in halls x1. L tibial scab with erythema and warmth. Coccyx, pink, AGUILA. Paracentesis done 3/12, 4L off, site CDI. 72hr hold, ends 3/16/2020 @ 0674. Discharge pending assessment from [facility].

## 2020-03-13 NOTE — PROGRESS NOTES
"CLINICAL NUTRITION SERVICES - REASSESSMENT NOTE      Recommendations Ordered by Registered Dietitian (RD): Add a morning Boost as well so he will be receiving BID between meals    Malnutrition: (3/9)  % Weight Loss: unable to assess (last recorded wt was 2/25)  % Intake:  No decreased intake noted (pt denies any decreased po intake)  Subcutaneous Fat Loss:  None observed  Muscle Loss:  Temporal region - mild depletion  Fluid Retention: trace     Malnutrition Diagnosis: Patient does not meet two of the above criteria necessary for diagnosing malnutrition, however, suspect some risk of malnutrition 2' to ETOH and recent diarrhea/GIB       EVALUATION OF PROGRESS TOWARD GOALS   Diet:  2 gram Na + Boost daily at 2 pm   Diet was advanced to solids on 3/10    Intake/Tolerance:  Visited with patient this morning.  He states that his appetite is \"much improved\" and he is eating well.  Patient is consuming 100% of meals.  Breakfast this morning was eggs, hashbrowns, veggie anselmo, muffin, coffee, and OJ.  Dinner last night consisted of stir quiroz, broccoli, ice cream, and Bates.   He states that he does have a \"full feeling\" due to his ascites but this does not seem to be interfering with his overall intake.       NEW FINDINGS:   3/12:  Paracentesis = 4000 mL     Previous Goals (3/9):   Pt to have diet advanced in the next 48 hrs  Evaluation: Met    Previous Nutrition Diagnosis (3/9):   Inadequate protein-energy intake related to restricted diet as evidenced by pt currently on a clear liquid diet  Evaluation: Resolved      CURRENT NUTRITION DIAGNOSIS  Increased nutrient needs related to H/O ETOH as evidenced by need for oral nutritional supplement between meals     INTERVENTIONS  Recommendations / Nutrition Prescription  Continue 2 gram Na diet as tolerated  Add a morning Boost as well so he will be receiving BID between meals     Implementation  Medical Food Supplement:  Second Boost added per patient request "     Goals  Patient will continue to consume >/= 75% meals and supplements     MONITORING AND EVALUATION:  Progress towards goals will be monitored and evaluated per protocol and Practice Guidelines    Nesha Muñoz RD, LD, CNSC   Clinical Dietitian - [facility]

## 2020-03-13 NOTE — PROGRESS NOTES
DANIELE Note:    D/I:  DANILEE spoke with [facility] Attorney regarding court hold for patient.  's office will call to nurse to start court hold and will send paperwork to hospital on Monday morning.    P: SW will continue to assist and follow as needed.    JULIA Navarro, LGSW  [phone]  [facility]

## 2020-03-13 NOTE — PROGRESS NOTES
[facility]    Medicine Progress Note - Hospitalist Service       Date of Admission:  3/7/2020  Assessment & Plan   Jim Richard is a 64 year-old male with history of alcohol dependence, alcoholic cirrhosis with history of varices, depression, anxiety, coronary artery disease, obstructive sleep apnea, peripheral vascular disease, prior subdural hematoma who presents with alcohol intoxication and melena. Admitted 3/7/2020.      Melena  Gastrointestinal bleed, unspecified site  anemia  H/o Esophageal varices  Presents with one week hx of melena.  Baseline hemoglobin 9-10 g/dl recent, on admission Hgb 9.4 g/dl. Last EGD 09/2019 showed normal esophagus with portal hypertensive gastropathy, normal duodenum. Mildly tachycardic on admission otherwise blood pressure stable. Suspected slow upper GI bleed.  - GI ([facility]) consulted, EGD did not show any bleeding ulcers or esophageal varices,  colonoscopy 3/10 with diverticulosis, 2 polyps, congested mucusa  - Continue pantoprazole IV BID as per GI  - Avoid anticoagulation  - transfuse <7, pt has been consented  - hgb stable 3/13     Cirrhosis with ascites  Alcoholic hepatitis  PTA lactulose 10 g TID, propranolol 10 mg BID, furosemide 40 mg daily, spironolactone 50 mg BID  - Continue lactulose, titrate to 2 stools/day  - restarted PTA propranolol 3/12  - Ceftriaxone IV ordered for SBP prophylaxis in setting of GIB, paracentesis did not indicate SBP.  Culture negative as of 3/13. 5 day course completed 3/13  - restarted lasix 20 mg daily, aldactone 50 mg daily 3/12  - s/p paracentesis with 4L out, cultures negative 3/13  - improving LFT's, will repeat am 3/14    Depression with hx of suicidal ideation  Anxiety  PTA duloxetine 60 mg daily, quetiapine 50 mg at bedtime PRN, trazodone 200 mg at bedtime, mirtazapine 15 mg at bedtime.   Poor medication compliance, he has not taken these meds for few weeks now. Psych had seen previous hospitalization. He denies  ongoing SI in the ED.   - Psychiatry has seen, greatly appreciate assistance  - continue duloxetine, mirtazapine, prn trazodone  - prn atarax for anxiety  - pt on 72 hour hold by psychiatry 3/10, per , petition for commitment is going to be supported     Alcohol intoxication  Alcohol use disorder  Drinking up to 1-1.75 L vodka daily. BARNEY on admission at 0.29. Does have hx of DT's, but no h/o withdrawal seizures.   - IV vitamins  - CIWA protocol with lorazepam, none needed since am 9:30a 3/10  - Psychiatry consulted as above  - Chemical dependency has seen. Most recent evaluation 2/13, still valid for 30 days. Possibility of [place]'s program for treatment  - will be under court commitment once 72 hour hold runs out     JANIS on CPAP  Continue CPAP per home settings     COPD  Tobacco abuse  Stable  - Continue prior to admission inhalers    Thrombocytopenia  Improving 3/13    Diet: 2 Gram Sodium Diet  Snacks/Supplements Adult: Other; 10 am and 2pm: straw Boost  (RD); Between Meals    DVT Prophylaxis: Pneumatic Compression Devices  Leegr Catheter: not present  Code Status: DNR/DNI      Disposition Plan   Expected discharge: unclear, awaiting further guidance from courts  Entered: Mark Gomez MD 03/13/2020, 11:25 AM     The patient's care was discussed with the Bedside Nurse and Patient.    Mark Gomez M.D.  Hospitalist  Pager [phone]  Text Page    ______________________________________________________________________    Interval History   Overnight events reviewed. abd feels distended but not worse than yesterday. No chest pain or sob. States L leg edema always worse than R after bypass surgery 15 years ago    Data reviewed today: I reviewed  All medications, new labs and imaging results over the last 24 hours. I personally reviewed no images or EKG's today.    Physical Exam   Vital Signs: Temp: 98.8  F (37.1  C) Temp src: Oral BP: 133/67 Pulse: 74 Heart Rate: 64 Resp: 16 SpO2: 96 % O2 Device: None  (Room air)    Weight: 0 lbs 0 oz    Constitutional: Awake, alert, cooperative, no apparent distress  Respiratory: Clear to auscultation bilaterally, no crackles or wheezing  Cardiovascular: Regular rate and rhythm, normal S1 and S2, and no murmur noted  GI: Normal bowel sounds, soft, distended, non-tender  Skin/Integumen: No rashes, no cyanosis. 1+ edema on L leg, none on R  Neuro : moving all 4 extremities, no focal deficit noted       Data   Recent Labs   Lab 03/13/20  0738 03/12/20  0720 03/11/20 2056 03/11/20  0720 03/10/20  0703 03/09/20  0710  03/07/20  1235   WBC 4.1  --   --  3.7*  --  4.3   < > 10.3   HGB 9.1*  --  8.0* 8.6*  --  8.1*   < > 9.4*   MCV 93  --   --  93  --  92   < > 92   *  --   --  83*  --  69*   < > 171   INR  --   --   --   --   --   --   --  1.24*    136  --   --  136  --    < > 135   POTASSIUM 3.4 3.3*  --   --  3.2*  --    < > 4.2   CHLORIDE 106 108  --   --  107  --    < > 103   CO2 22 20  --   --  23  --    < > 17*   BUN 12 15  --   --  17  --    < > 34*   CR 1.21 1.04  --   --  1.00  --    < > 1.20   ANIONGAP 7 8  --   --  6  --    < > 15*   KEV 8.2* 7.9*  --   --  8.5  --    < > 8.4*   GLC 94 99  --   --  98  --    < > 70   ALBUMIN  --  2.2*  --  2.5* 2.4*  --    < >  --    PROTTOTAL  --  5.5*  --   --  5.7*  --    < >  --    BILITOTAL  --  0.8  --   --  1.1  --    < >  --    ALKPHOS  --  154*  --   --  170*  --    < >  --    ALT  --  95*  --   --  153*  --    < >  --    AST  --  91*  --   --  365*  --    < >  --     < > = values in this interval not displayed.       Recent Results (from the past 24 hour(s))   US Paracentesis    Narrative    US PARACENTESIS 3/12/2020 1:07 PM    CLINICAL HISTORY: Ascites.    PROCEDURE: Informed consent obtained. Time out performed. The abdomen  was prepped and draped in a sterile fashion. 10 mL of 1% lidocaine was  infused into local soft tissues. A 5 [place]ian catheter system was  introduced into the abdominal ascites under ultrasound  guidance.    4 liters of clear fluid were removed and sent to lab if requested.    Patient tolerated procedure well.    Ultrasound imaging was obtained and placed in the patient's permanent  medical record.      Impression    IMPRESSION:  1.  Status post ultrasound-guided paracentesis.    JASON BENNETT MD     Medications     - MEDICATION INSTRUCTIONS -       - MEDICATION INSTRUCTIONS -       - MEDICATION INSTRUCTIONS -         albumin human  12.5 g Intravenous Once     cefTRIAXone  1 g Intravenous Q24H     DULoxetine  60 mg Oral Daily     fluticasone-vilanterol  1 puff Inhalation Daily     furosemide  20 mg Oral Daily     lactulose  30 mL Oral BID     mirtazapine  15 mg Oral At Bedtime     pantoprazole (PROTONIX) IV  40 mg Intravenous BID     polyethylene glycol  238 g Oral Once     sodium chloride (PF)  3 mL Intracatheter Q8H     sodium chloride (PF)  3 mL Intracatheter Q8H     spironolactone  50 mg Oral Daily

## 2020-03-13 NOTE — PLAN OF CARE
A&Ox4. VSS on RA. CIWAs 2, 2, 2. PRN atarax x2 for anxiety . L tibial scab CDI. Coccyx pink encouraged to reposition, AGUILA. Ambulated the halls x2. SBA in halls, ind in room. On 72 hour hold ends 3/16. Court hold started today at 1610

## 2020-03-14 LAB
ALBUMIN SERPL-MCNC: 2.3 G/DL (ref 3.4–5)
ALP SERPL-CCNC: 169 U/L (ref 40–150)
ALT SERPL W P-5'-P-CCNC: 69 U/L (ref 0–70)
AST SERPL W P-5'-P-CCNC: 43 U/L (ref 0–45)
BACTERIA SPEC CULT: NO GROWTH
BILIRUB DIRECT SERPL-MCNC: 0.3 MG/DL (ref 0–0.2)
BILIRUB SERPL-MCNC: 0.5 MG/DL (ref 0.2–1.3)
HGB BLD-MCNC: 8 G/DL (ref 13.3–17.7)
PROT SERPL-MCNC: 5.6 G/DL (ref 6.8–8.8)
SPECIMEN SOURCE: NORMAL

## 2020-03-14 PROCEDURE — 25000128 H RX IP 250 OP 636: Performed by: STUDENT IN AN ORGANIZED HEALTH CARE EDUCATION/TRAINING PROGRAM

## 2020-03-14 PROCEDURE — 99232 SBSQ HOSP IP/OBS MODERATE 35: CPT | Performed by: HOSPITALIST

## 2020-03-14 PROCEDURE — 80076 HEPATIC FUNCTION PANEL: CPT | Performed by: INTERNAL MEDICINE

## 2020-03-14 PROCEDURE — 25000132 ZZH RX MED GY IP 250 OP 250 PS 637: Mod: GY | Performed by: INTERNAL MEDICINE

## 2020-03-14 PROCEDURE — 12000000 ZZH R&B MED SURG/OB

## 2020-03-14 PROCEDURE — C9113 INJ PANTOPRAZOLE SODIUM, VIA: HCPCS | Performed by: STUDENT IN AN ORGANIZED HEALTH CARE EDUCATION/TRAINING PROGRAM

## 2020-03-14 PROCEDURE — 85018 HEMOGLOBIN: CPT | Performed by: INTERNAL MEDICINE

## 2020-03-14 PROCEDURE — 25000132 ZZH RX MED GY IP 250 OP 250 PS 637: Mod: GY | Performed by: STUDENT IN AN ORGANIZED HEALTH CARE EDUCATION/TRAINING PROGRAM

## 2020-03-14 PROCEDURE — 36415 COLL VENOUS BLD VENIPUNCTURE: CPT | Performed by: INTERNAL MEDICINE

## 2020-03-14 PROCEDURE — 25000132 ZZH RX MED GY IP 250 OP 250 PS 637: Mod: GY | Performed by: HOSPITALIST

## 2020-03-14 RX ORDER — LACTULOSE 10 G/15ML
15 SOLUTION ORAL 4 TIMES DAILY
Status: DISCONTINUED | OUTPATIENT
Start: 2020-03-14 | End: 2020-03-16

## 2020-03-14 RX ORDER — PANTOPRAZOLE SODIUM 40 MG/1
40 TABLET, DELAYED RELEASE ORAL
Status: DISCONTINUED | OUTPATIENT
Start: 2020-03-14 | End: 2020-03-30

## 2020-03-14 RX ADMIN — HYDROXYZINE HYDROCHLORIDE 25 MG: 25 TABLET, FILM COATED ORAL at 10:15

## 2020-03-14 RX ADMIN — HYDROXYZINE HYDROCHLORIDE 25 MG: 25 TABLET, FILM COATED ORAL at 22:23

## 2020-03-14 RX ADMIN — MIRTAZAPINE 15 MG: 15 TABLET, FILM COATED ORAL at 21:50

## 2020-03-14 RX ADMIN — HYDROXYZINE HYDROCHLORIDE 25 MG: 25 TABLET, FILM COATED ORAL at 16:25

## 2020-03-14 RX ADMIN — DULOXETINE 60 MG: 60 CAPSULE, DELAYED RELEASE ORAL at 10:12

## 2020-03-14 RX ADMIN — FUROSEMIDE 20 MG: 20 TABLET ORAL at 10:11

## 2020-03-14 RX ADMIN — PANTOPRAZOLE SODIUM 40 MG: 40 TABLET, DELAYED RELEASE ORAL at 17:10

## 2020-03-14 RX ADMIN — SPIRONOLACTONE 50 MG: 25 TABLET ORAL at 10:11

## 2020-03-14 RX ADMIN — TRAZODONE HYDROCHLORIDE 50 MG: 50 TABLET ORAL at 21:50

## 2020-03-14 RX ADMIN — PANTOPRAZOLE SODIUM 40 MG: 40 INJECTION, POWDER, FOR SOLUTION INTRAVENOUS at 10:12

## 2020-03-14 ASSESSMENT — ACTIVITIES OF DAILY LIVING (ADL)
ADLS_ACUITY_SCORE: 18
ADLS_ACUITY_SCORE: 16
ADLS_ACUITY_SCORE: 17
ADLS_ACUITY_SCORE: 17
ADLS_ACUITY_SCORE: 16
ADLS_ACUITY_SCORE: 17

## 2020-03-14 NOTE — PROGRESS NOTES
[facility]    Medicine Progress Note - Hospitalist Service       Date of Admission:  3/7/2020  Assessment & Plan   Jim Richard is a 64 year-old male with history of alcohol dependence, alcoholic cirrhosis with history of varices, depression, anxiety, coronary artery disease, obstructive sleep apnea, peripheral vascular disease, prior subdural hematoma who presents with alcohol intoxication and melena. Admitted 3/7/2020.      Melena  Gastrointestinal bleed likely secondary to esophagitis  anemia  H/o Esophageal varices  Acute on chronic blood loss anemia secondary to cirrhosis and GI bleed  Presents with one week hx of melena.  Baseline hemoglobin 9-10 g/dl recent, on admission Hgb 9.4 g/dl. Last EGD 09/2019 showed normal esophagus with portal hypertensive gastropathy, normal duodenum. Mildly tachycardic on admission otherwise blood pressure stable. Suspected slow upper GI bleed.  - GI ([facility]) consulted, EGD did not show any bleeding ulcers , noted to have grade 1 esophageal varices with no signs of bleeding,  colonoscopy 3/10 with diverticulosis, 2 polyps, congested mucusa  - Continue pantoprazole  BID as per GI, switch to oral  - Avoid anticoagulation  - transfuse <7, pt has been consented  - hgb stable, continue to monitor     Cirrhosis with ascites  Alcoholic hepatitis  PTA lactulose 10 g TID, propranolol 10 mg BID, furosemide 40 mg daily, spironolactone 50 mg BID  - Continue lactulose, titrate to 2 stools/day  - restarted PTA propranolol 3/12  - Ceftriaxone IV ordered for SBP prophylaxis in setting of GIB, paracentesis did not indicate SBP.  Culture negative as of 3/13. 5 day course completed 3/13  - restarted lasix 20 mg daily, aldactone 50 mg daily 3/12  - s/p paracentesis x2 on 3/9 and 3/12 .  Acetic fluid cultures negative.  -May need repeat paracentesis  -Monitor LFTs    Depression with hx of suicidal ideation  Anxiety  PTA duloxetine 60 mg daily, quetiapine 50 mg at bedtime PRN,  trazodone 200 mg at bedtime, mirtazapine 15 mg at bedtime.   Poor medication compliance, he has not taken these meds for few weeks now. Psych had seen previous hospitalization. He denies ongoing SI in the ED.   - Psychiatry has seen, greatly appreciate assistance  - continue duloxetine, mirtazapine, prn trazodone  - prn atarax for anxiety  - pt on 72 hour hold by psychiatry 3/10, per , petition for commitment is going to be supported     Alcohol intoxication  Alcohol use disorder  Drinking up to 1-1.75 L vodka daily. BARNEY on admission at 0.29. Does have hx of DT's, but no h/o withdrawal seizures.   - IV vitamins  - CIWA protocol with lorazepam, none needed since am 9:30a 3/10  - Psychiatry consulted as above  - Chemical dependency has seen. Most recent evaluation 2/13, still valid for 30 days. Possibility of [place]'s program for treatment  - will be under court commitment once 72 hour hold runs out     JANIS on CPAP  Continue CPAP per home settings     COPD  Tobacco abuse  Stable  - Continue prior to admission inhalers    Thrombocytopenia secondary to cirrhosis  Improving 3/13    Diet: 2 Gram Sodium Diet  Snacks/Supplements Adult: Other; 10 am and 2pm: straw Boost  (RD); Between Meals    DVT Prophylaxis: Pneumatic Compression Devices  Leger Catheter: not present  Code Status: DNR/DNI      Disposition Plan   Expected discharge: Will remain under court commitment after 72-hour hold runs out, appreciate social work assistance with arranging safe disposition.  Entered: Hanny Knowles MD 03/14/2020, 3:17 PM     The patient's care was discussed with the Bedside Nurse and Patient.    ______________________________________________________________________    Interval History   Stable overnight. abd feels distended but not worse than yesterday, he does not think he needs paracentesis again today. No chest pain or sob.  No fevers.    Data reviewed today: I reviewed  All medications, new labs and imaging results over the last  24 hours. I personally reviewed no images or EKG's today.    Physical Exam   Vital Signs: Temp: 97.6  F (36.4  C) Temp src: Oral BP: 129/64   Heart Rate: 67 Resp: 18 SpO2: 99 % O2 Device: None (Room air)    Weight: 0 lbs 0 oz    Constitutional: Awake, alert, cooperative, no apparent distress  Respiratory: Clear to auscultation bilaterally, no crackles or wheezing  Cardiovascular: Regular rate and rhythm, normal S1 and S2, and no murmur noted  GI: Normal bowel sounds, firm and distended, nontender  Skin/Integumen: No rashes, no cyanosis. 1+ edema on L leg, none on R  Neuro : moving all 4 extremities, no focal deficit noted       Data   Recent Labs   Lab 03/14/20  0702 03/13/20  0738 03/12/20 0720 03/11/20 2056 03/11/20  0720 03/10/20  0703 03/09/20  0710   WBC  --  4.1  --   --  3.7*  --  4.3   HGB 8.0* 9.1*  --  8.0* 8.6*  --  8.1*   MCV  --  93  --   --  93  --  92   PLT  --  114*  --   --  83*  --  69*   NA  --  135 136  --   --  136  --    POTASSIUM  --  3.4 3.3*  --   --  3.2*  --    CHLORIDE  --  106 108  --   --  107  --    CO2  --  22 20  --   --  23  --    BUN  --  12 15  --   --  17  --    CR  --  1.21 1.04  --   --  1.00  --    ANIONGAP  --  7 8  --   --  6  --    KEV  --  8.2* 7.9*  --   --  8.5  --    GLC  --  94 99  --   --  98  --    ALBUMIN 2.3*  --  2.2*  --  2.5* 2.4*  --    PROTTOTAL 5.6*  --  5.5*  --   --  5.7*  --    BILITOTAL 0.5  --  0.8  --   --  1.1  --    ALKPHOS 169*  --  154*  --   --  170*  --    ALT 69  --  95*  --   --  153*  --    AST 43  --  91*  --   --  365*  --        No results found for this or any previous visit (from the past 24 hour(s)).  Medications     - MEDICATION INSTRUCTIONS -       - MEDICATION INSTRUCTIONS -       - MEDICATION INSTRUCTIONS -         DULoxetine  60 mg Oral Daily     fluticasone-vilanterol  1 puff Inhalation Daily     furosemide  20 mg Oral Daily     lactulose  15 mL Oral 4x Daily     mirtazapine  15 mg Oral At Bedtime     pantoprazole (PROTONIX) IV   40 mg Intravenous BID     polyethylene glycol  238 g Oral Once     sodium chloride (PF)  3 mL Intracatheter Q8H     spironolactone  50 mg Oral Daily

## 2020-03-14 NOTE — PLAN OF CARE
Patient is A/ox4, anxious. VSS on RA. Denied pain. LS diminished. CIWA 5 and 2, mild tremor noted in bilateral hands. PRN Atarax given x1. 3 loose BMs today, scheduled Lactulose changed to 15mL QID per patient request, lactulose held due to having more than 2 stools. Abdomen distended/rounded and firm. R paracentesis site CDI. L shin scabbed over and AGUILA. Up independently in room, SBA in hallway. R PIV SL. PT/OT ordered today. Patient currently on 72 hour hold until Monday, March 16th at 0659, then court hold will begin. Sitter at bedside. Nursing to continue to monitor.

## 2020-03-15 LAB — HGB BLD-MCNC: 9.1 G/DL (ref 13.3–17.7)

## 2020-03-15 PROCEDURE — 25000132 ZZH RX MED GY IP 250 OP 250 PS 637: Mod: GY | Performed by: INTERNAL MEDICINE

## 2020-03-15 PROCEDURE — 25000132 ZZH RX MED GY IP 250 OP 250 PS 637: Mod: GY | Performed by: STUDENT IN AN ORGANIZED HEALTH CARE EDUCATION/TRAINING PROGRAM

## 2020-03-15 PROCEDURE — 99232 SBSQ HOSP IP/OBS MODERATE 35: CPT | Performed by: HOSPITALIST

## 2020-03-15 PROCEDURE — 12000000 ZZH R&B MED SURG/OB

## 2020-03-15 PROCEDURE — 36415 COLL VENOUS BLD VENIPUNCTURE: CPT | Performed by: HOSPITALIST

## 2020-03-15 PROCEDURE — 85018 HEMOGLOBIN: CPT | Performed by: HOSPITALIST

## 2020-03-15 PROCEDURE — 25000132 ZZH RX MED GY IP 250 OP 250 PS 637: Mod: GY | Performed by: HOSPITALIST

## 2020-03-15 RX ORDER — FUROSEMIDE 20 MG
20 TABLET ORAL ONCE
Status: COMPLETED | OUTPATIENT
Start: 2020-03-15 | End: 2020-03-15

## 2020-03-15 RX ORDER — FUROSEMIDE 40 MG
40 TABLET ORAL DAILY
Status: DISCONTINUED | OUTPATIENT
Start: 2020-03-16 | End: 2020-03-16

## 2020-03-15 RX ORDER — SPIRONOLACTONE 25 MG/1
100 TABLET ORAL DAILY
Status: DISCONTINUED | OUTPATIENT
Start: 2020-03-16 | End: 2020-03-16

## 2020-03-15 RX ORDER — SPIRONOLACTONE 25 MG/1
50 TABLET ORAL ONCE
Status: COMPLETED | OUTPATIENT
Start: 2020-03-15 | End: 2020-03-15

## 2020-03-15 RX ADMIN — SPIRONOLACTONE 50 MG: 25 TABLET ORAL at 09:30

## 2020-03-15 RX ADMIN — HYDROXYZINE HYDROCHLORIDE 25 MG: 25 TABLET, FILM COATED ORAL at 09:30

## 2020-03-15 RX ADMIN — PANTOPRAZOLE SODIUM 40 MG: 40 TABLET, DELAYED RELEASE ORAL at 16:49

## 2020-03-15 RX ADMIN — SPIRONOLACTONE 50 MG: 25 TABLET ORAL at 13:22

## 2020-03-15 RX ADMIN — PANTOPRAZOLE SODIUM 40 MG: 40 TABLET, DELAYED RELEASE ORAL at 06:31

## 2020-03-15 RX ADMIN — HYDROXYZINE HYDROCHLORIDE 25 MG: 25 TABLET, FILM COATED ORAL at 15:43

## 2020-03-15 RX ADMIN — LACTULOSE 15 ML: 10 SOLUTION ORAL at 09:30

## 2020-03-15 RX ADMIN — HYDROXYZINE HYDROCHLORIDE 25 MG: 25 TABLET, FILM COATED ORAL at 22:17

## 2020-03-15 RX ADMIN — LACTULOSE 15 ML: 10 SOLUTION ORAL at 13:22

## 2020-03-15 RX ADMIN — FUROSEMIDE 20 MG: 20 TABLET ORAL at 13:22

## 2020-03-15 RX ADMIN — TRAZODONE HYDROCHLORIDE 50 MG: 50 TABLET ORAL at 21:08

## 2020-03-15 RX ADMIN — MIRTAZAPINE 15 MG: 15 TABLET, FILM COATED ORAL at 21:08

## 2020-03-15 RX ADMIN — FUROSEMIDE 20 MG: 20 TABLET ORAL at 09:30

## 2020-03-15 RX ADMIN — DULOXETINE 60 MG: 60 CAPSULE, DELAYED RELEASE ORAL at 09:30

## 2020-03-15 ASSESSMENT — ACTIVITIES OF DAILY LIVING (ADL)
ADLS_ACUITY_SCORE: 16
ADLS_ACUITY_SCORE: 15
ADLS_ACUITY_SCORE: 16
ADLS_ACUITY_SCORE: 16
ADLS_ACUITY_SCORE: 15
ADLS_ACUITY_SCORE: 16

## 2020-03-15 NOTE — PROGRESS NOTES
A&O. VSS. 2g NA diet. Up independently in room. CIWA q4h, scored 3 for hand tremors. Thoracentesis sites C/D/I. Slept between cares. PIV SL. Continue to monitor.

## 2020-03-15 NOTE — PROGRESS NOTES
[facility]    Medicine Progress Note - Hospitalist Service       Date of Admission:  3/7/2020  Assessment & Plan   Jim Richard is a 64 year-old male with history of alcohol dependence, alcoholic cirrhosis with history of varices, depression, anxiety, coronary artery disease, obstructive sleep apnea, peripheral vascular disease, prior subdural hematoma who presents with alcohol intoxication and melena. Admitted 3/7/2020.      Melena  Gastrointestinal bleed likely secondary to esophagitis  anemia  H/o Esophageal varices  Acute on chronic blood loss anemia secondary to cirrhosis and GI bleed  Presents with one week hx of melena.  Baseline hemoglobin 9-10 g/dl recent, on admission Hgb 9.4 g/dl. Last EGD 09/2019 showed normal esophagus with portal hypertensive gastropathy, normal duodenum. Mildly tachycardic on admission otherwise blood pressure stable. Suspected slow upper GI bleed.  - GI ([facility]) consulted, EGD did not show any bleeding ulcers , noted to have grade 1 esophageal varices with no signs of bleeding,  colonoscopy 3/10 with diverticulosis, 2 polyps, congested mucosa  - Continue pantoprazole  BID as per GI   - Avoid anticoagulation  - transfuse <7, pt has been consented  - hgb stable, continue to monitor     Cirrhosis with ascites  Alcoholic hepatitis  PTA lactulose 10 g TID, propranolol 10 mg BID, furosemide 40 mg daily, spironolactone 50 mg bid  - Continue lactulose, titrate to 2 stools/day  - Ceftriaxone IV ordered for SBP prophylaxis in setting of GIB, paracentesis did not indicate SBP.  Culture negative as of 3/13. 5 day course completed 3/13  - restarted lasix 20 mg daily, aldactone 50 mg daily 3/12, dose doubled  on 3/15.   - s/p paracentesis x2 on 3/9 and 3/12 .  Ascitic fluid cultures negative.  -May need repeat paracentesis  -Monitor LFTs and renal function    Depression with hx of suicidal ideation  Anxiety  PTA duloxetine 60 mg daily, quetiapine 50 mg at bedtime PRN,  trazodone 200 mg at bedtime, mirtazapine 15 mg at bedtime.   Poor medication compliance, he has not taken these meds for few weeks now. Psych had seen previous hospitalization. He denies ongoing SI in the ED.   - Psychiatry has seen, greatly appreciate assistance  - continue duloxetine, mirtazapine, prn trazodone  - prn atarax for anxiety  - pt on 72 hour hold by psychiatry 3/10, per , petition for commitment is going to be supported     Alcohol intoxication  Alcohol use disorder  Drinking up to 1-1.75 L vodka daily. BARNEY on admission at 0.29. Does have hx of DT's, but no h/o withdrawal seizures.   - IV vitamins  - CIWA protocol with lorazepam, none needed since am 9:30a 3/10  - Psychiatry consulted as above  - Chemical dependency has seen. Most recent evaluation 2/13, still valid for 30 days. Possibility of [place] [facility]'s program for treatment  - under court commitment after 72 hour hold runs out     JANIS on CPAP  Continue CPAP per home settings     COPD  Tobacco abuse  Stable  - Continue prior to admission inhalers    Thrombocytopenia secondary to cirrhosis  Improving 3/13    Diet: 2 Gram Sodium Diet  Snacks/Supplements Adult: Other; 10 am and 2pm: straw Boost  (RD); Between Meals    DVT Prophylaxis: Pneumatic Compression Devices  Leger Catheter: not present  Code Status: DNR/DNI      Disposition Plan   Expected discharge: Will remain under court commitment after 72-hour hold runs out, appreciate social work assistance with arranging safe disposition.  Entered: Hanny Knowles MD 03/15/2020, 12:01 PM     The patient's care was discussed with the Bedside Nurse and Patient.    ______________________________________________________________________    Interval History   Stable overnight. abd feels distended but stable, not painful. Afebrile. Walking the hallways. Had a lot loose stools yesterday and wants to back down on lactulose.    Data reviewed today: I reviewed  All medications, new labs and imaging results over the  last 24 hours. I personally reviewed no images or EKG's today.    Physical Exam   Vital Signs: Temp: 96.9  F (36.1  C) Temp src: Oral BP: 135/47 Pulse: 65 Heart Rate: 59 Resp: 18 SpO2: 96 % O2 Device: None (Room air)    Weight: 0 lbs 0 oz    Constitutional: Awake, alert, cooperative, no apparent distress  Respiratory: Clear to auscultation bilaterally, no crackles or wheezing  Cardiovascular: Regular rate and rhythm, normal S1 and S2, and no murmur noted  GI: Normal bowel sounds,  distended and slightly firm, nontender  Skin/Integumen: No rashes, 1+ edema on L leg, none on R  Neuro : moving all 4 extremities, no focal deficit noted       Data   Recent Labs   Lab 03/15/20  0706 03/14/20  0702 03/13/20  0738 03/12/20  0720  03/11/20  0720 03/10/20  0703  03/09/20  0710   WBC  --   --  4.1  --   --  3.7*  --   --  4.3   HGB 9.1* 8.0* 9.1*  --    < > 8.6*  --   --  8.1*   MCV  --   --  93  --   --  93  --   --  92   PLT  --   --  114*  --   --  83*  --   --  69*   NA  --   --  135 136  --   --  136  --   --    POTASSIUM  --   --  3.4 3.3*  --   --  3.2*  --   --    CHLORIDE  --   --  106 108  --   --  107  --   --    CO2  --   --  22 20  --   --  23  --   --    BUN  --   --  12 15  --   --  17  --   --    CR  --   --  1.21 1.04  --   --  1.00  --   --    ANIONGAP  --   --  7 8  --   --  6  --   --    KEV  --   --  8.2* 7.9*  --   --  8.5  --   --    GLC  --   --  94 99  --   --  98  --   --    ALBUMIN  --  2.3*  --  2.2*  --  2.5* 2.4*   < >  --    PROTTOTAL  --  5.6*  --  5.5*  --   --  5.7*   < >  --    BILITOTAL  --  0.5  --  0.8  --   --  1.1   < >  --    ALKPHOS  --  169*  --  154*  --   --  170*   < >  --    ALT  --  69  --  95*  --   --  153*   < >  --    AST  --  43  --  91*  --   --  365*   < >  --     < > = values in this interval not displayed.       No results found for this or any previous visit (from the past 24 hour(s)).  Medications     - MEDICATION INSTRUCTIONS -       - MEDICATION INSTRUCTIONS -        - MEDICATION INSTRUCTIONS -         DULoxetine  60 mg Oral Daily     fluticasone-vilanterol  1 puff Inhalation Daily     furosemide  20 mg Oral Daily     lactulose  15 mL Oral 4x Daily     mirtazapine  15 mg Oral At Bedtime     pantoprazole  40 mg Oral BID AC     polyethylene glycol  238 g Oral Once     sodium chloride (PF)  3 mL Intracatheter Q8H     spironolactone  50 mg Oral Daily

## 2020-03-15 NOTE — PLAN OF CARE
Patient is A/ox4. VSS on RA. Denied pain. CIWA 3, 3. Mild tremor to bilateral hands still. Anxious at times, PRN Atarax given x1. Abdomen distended. Scheduled diuretics increased today. 1 BM so far today, lactulose doses given today. Up independently in room, SBA in hallway. OT refused to work with patient today, MD notified during rounds today. Ambulated hallway x1. 72 hours hold continues until tomorrow morning at 0659, then court hold begins. Sitter continues at bedside. Plan for possible paracentesis early this week. Nursing to continue to monitor.

## 2020-03-16 LAB
ANION GAP SERPL CALCULATED.3IONS-SCNC: 5 MMOL/L (ref 3–14)
BUN SERPL-MCNC: 20 MG/DL (ref 7–30)
CALCIUM SERPL-MCNC: 8.6 MG/DL (ref 8.5–10.1)
CHLORIDE SERPL-SCNC: 104 MMOL/L (ref 94–109)
CO2 SERPL-SCNC: 25 MMOL/L (ref 20–32)
CREAT SERPL-MCNC: 1.45 MG/DL (ref 0.66–1.25)
GFR SERPL CREATININE-BSD FRML MDRD: 50 ML/MIN/{1.73_M2}
GLUCOSE SERPL-MCNC: 85 MG/DL (ref 70–99)
POTASSIUM SERPL-SCNC: 4.1 MMOL/L (ref 3.4–5.3)
SODIUM SERPL-SCNC: 134 MMOL/L (ref 133–144)

## 2020-03-16 PROCEDURE — 99232 SBSQ HOSP IP/OBS MODERATE 35: CPT | Performed by: HOSPITALIST

## 2020-03-16 PROCEDURE — 36415 COLL VENOUS BLD VENIPUNCTURE: CPT | Performed by: HOSPITALIST

## 2020-03-16 PROCEDURE — 25000132 ZZH RX MED GY IP 250 OP 250 PS 637: Mod: GY | Performed by: HOSPITALIST

## 2020-03-16 PROCEDURE — 80048 BASIC METABOLIC PNL TOTAL CA: CPT | Performed by: HOSPITALIST

## 2020-03-16 PROCEDURE — 12000000 ZZH R&B MED SURG/OB

## 2020-03-16 PROCEDURE — 25000132 ZZH RX MED GY IP 250 OP 250 PS 637: Mod: GY | Performed by: INTERNAL MEDICINE

## 2020-03-16 PROCEDURE — 25000132 ZZH RX MED GY IP 250 OP 250 PS 637: Mod: GY | Performed by: STUDENT IN AN ORGANIZED HEALTH CARE EDUCATION/TRAINING PROGRAM

## 2020-03-16 RX ORDER — FUROSEMIDE 20 MG
20 TABLET ORAL DAILY
Status: DISCONTINUED | OUTPATIENT
Start: 2020-03-16 | End: 2020-04-09 | Stop reason: HOSPADM

## 2020-03-16 RX ORDER — LACTULOSE 10 G/15ML
20 SOLUTION ORAL 2 TIMES DAILY
Status: DISCONTINUED | OUTPATIENT
Start: 2020-03-16 | End: 2020-03-21

## 2020-03-16 RX ORDER — SPIRONOLACTONE 25 MG/1
50 TABLET ORAL DAILY
Status: DISCONTINUED | OUTPATIENT
Start: 2020-03-16 | End: 2020-03-19

## 2020-03-16 RX ADMIN — SPIRONOLACTONE 50 MG: 25 TABLET ORAL at 09:29

## 2020-03-16 RX ADMIN — FLUTICASONE FUROATE AND VILANTEROL TRIFENATATE 1 PUFF: 200; 25 POWDER RESPIRATORY (INHALATION) at 09:29

## 2020-03-16 RX ADMIN — HYDROXYZINE HYDROCHLORIDE 25 MG: 25 TABLET, FILM COATED ORAL at 21:53

## 2020-03-16 RX ADMIN — MIRTAZAPINE 15 MG: 15 TABLET, FILM COATED ORAL at 21:53

## 2020-03-16 RX ADMIN — LACTULOSE 15 ML: 10 SOLUTION ORAL at 09:29

## 2020-03-16 RX ADMIN — HYDROXYZINE HYDROCHLORIDE 25 MG: 25 TABLET, FILM COATED ORAL at 09:29

## 2020-03-16 RX ADMIN — LACTULOSE 15 ML: 10 SOLUTION ORAL at 13:13

## 2020-03-16 RX ADMIN — PANTOPRAZOLE SODIUM 40 MG: 40 TABLET, DELAYED RELEASE ORAL at 07:02

## 2020-03-16 RX ADMIN — DULOXETINE 60 MG: 60 CAPSULE, DELAYED RELEASE ORAL at 09:29

## 2020-03-16 RX ADMIN — FUROSEMIDE 20 MG: 20 TABLET ORAL at 09:29

## 2020-03-16 RX ADMIN — HYDROXYZINE HYDROCHLORIDE 25 MG: 25 TABLET, FILM COATED ORAL at 15:56

## 2020-03-16 RX ADMIN — PANTOPRAZOLE SODIUM 40 MG: 40 TABLET, DELAYED RELEASE ORAL at 15:56

## 2020-03-16 RX ADMIN — TRAZODONE HYDROCHLORIDE 50 MG: 50 TABLET ORAL at 21:53

## 2020-03-16 ASSESSMENT — ACTIVITIES OF DAILY LIVING (ADL)
ADLS_ACUITY_SCORE: 16
ADLS_ACUITY_SCORE: 15
ADLS_ACUITY_SCORE: 16
ADLS_ACUITY_SCORE: 16
ADLS_ACUITY_SCORE: 15
ADLS_ACUITY_SCORE: 11

## 2020-03-16 NOTE — PROGRESS NOTES
[facility]    Medicine Progress Note - Hospitalist Service       Date of Admission:  3/7/2020  Assessment & Plan   Jim Richard is a 64 year-old male with history of alcohol dependence, alcoholic cirrhosis with history of varices, depression, anxiety, coronary artery disease, obstructive sleep apnea, peripheral vascular disease, prior subdural hematoma who presents with alcohol intoxication and melena. Admitted 3/7/2020.      Melena  Gastrointestinal bleed likely secondary to esophagitis  anemia  H/o Esophageal varices  Acute on chronic blood loss anemia secondary to cirrhosis and GI bleed  Presents with one week hx of melena.  Baseline hemoglobin 9-10 g/dl recent, on admission Hgb 9.4 g/dl. Last EGD 09/2019 showed normal esophagus with portal hypertensive gastropathy, normal duodenum. Mildly tachycardic on admission otherwise blood pressure stable. Suspected slow upper GI bleed.  - GI ([facility]) consulted, EGD did not show any bleeding ulcers , noted to have grade 1 esophageal varices with no signs of bleeding,  colonoscopy 3/10 with diverticulosis, 2 polyps, congested mucosa  - Continue pantoprazole  BID as per GI   - Avoid anticoagulation  - transfuse <7, pt has been consented  - hgb stable, continue to monitor     Cirrhosis with ascites  Alcoholic hepatitis  PTA lactulose 10 g TID, propranolol 10 mg BID, furosemide 40 mg daily, spironolactone 50 mg bid  - Continue lactulose, titrate to 2 stools/day  - Ceftriaxone IV ordered for SBP prophylaxis in setting of GIB, paracentesis did not indicate SBP.  Culture negative as of 3/13. 5 day course completed 3/13  - restarted lasix 20 mg daily, aldactone 50 mg daily 3/12, dose doubled  on 3/15. However as creatinine trended up , decreased dose back again on 3/16  - s/p paracentesis x2 on 3/9 and 3/12 .  Ascitic fluid cultures negative.  -May need repeat paracentesis  -Monitor LFTs and renal function    Depression with hx of suicidal  ideation  Anxiety  PTA duloxetine 60 mg daily, quetiapine 50 mg at bedtime PRN, trazodone 200 mg at bedtime, mirtazapine 15 mg at bedtime.   Poor medication compliance, he has not taken these meds for few weeks now. Psych had seen previous hospitalization. He denies ongoing SI in the ED.   - Psychiatry has seen, greatly appreciate assistance  - continue duloxetine, mirtazapine, prn trazodone  - prn atarax for anxiety  - pt on 72 hour hold by psychiatry 3/10, per , petition for commitment is going to be supported     Alcohol intoxication  Alcohol use disorder  Drinking up to 1-1.75 L vodka daily. BARNEY on admission at 0.29. Does have hx of DT's, but no h/o withdrawal seizures.   - IV vitamins  - CIWA protocol with lorazepam, none needed since am 9:30a 3/10  - Psychiatry consulted as above  - Chemical dependency has seen. Most recent evaluation 2/13, still valid for 30 days. Possibility of [facility] program for treatment  - under court commitment after 72 hour hold runs out     JANIS on CPAP  Continue CPAP per home settings     COPD  Tobacco abuse  Stable  - Continue prior to admission inhalers    Thrombocytopenia secondary to cirrhosis  Improving 3/13    Diet: 2 Gram Sodium Diet  Snacks/Supplements Adult: Other; 10 am and 2pm: straw Boost  (RD); Between Meals    DVT Prophylaxis: Pneumatic Compression Devices  Leger Catheter: not present  Code Status: DNR/DNI      Disposition Plan   Expected discharge: Will remain under court commitment after 72-hour hold runs out, appreciate social work assistance with arranging safe disposition.  Entered: Hanny Knowles MD 03/16/2020, 3:20 PM     The patient's care was discussed with the Bedside Nurse and Patient.    ______________________________________________________________________    Interval History   Stable overnight. abd feels distended but stable, not painful. Afebrile. Walking the hallways. Had a lot loose stools yesterday and wants to back down on lactulose.    Data  reviewed today: I reviewed  All medications, new labs and imaging results over the last 24 hours. I personally reviewed no images or EKG's today.    Physical Exam   Vital Signs: Temp: 96.9  F (36.1  C) Temp src: Oral BP: 132/53 Pulse: 60 Heart Rate: 63 Resp: 16 SpO2: 98 % O2 Device: None (Room air)    Weight: 0 lbs 0 oz    Constitutional: Awake, alert, cooperative, no apparent distress  Respiratory: Clear to auscultation bilaterally, no crackles or wheezing  Cardiovascular: Regular rate and rhythm, normal S1 and S2, and no murmur noted  GI: Normal bowel sounds,  distended and slightly firm, nontender  Skin/Integumen: No rashes, 1+ edema on L leg, none on R  Neuro : moving all 4 extremities, no focal deficit noted       Data   Recent Labs   Lab 03/16/20  0718 03/15/20  0706 03/14/20  0702 03/13/20  0738 03/12/20  0720  03/11/20  0720   WBC  --   --   --  4.1  --   --  3.7*   HGB  --  9.1* 8.0* 9.1*  --    < > 8.6*   MCV  --   --   --  93  --   --  93   PLT  --   --   --  114*  --   --  83*     --   --  135 136  --   --    POTASSIUM 4.1  --   --  3.4 3.3*  --   --    CHLORIDE 104  --   --  106 108  --   --    CO2 25  --   --  22 20  --   --    BUN 20  --   --  12 15  --   --    CR 1.45*  --   --  1.21 1.04  --   --    ANIONGAP 5  --   --  7 8  --   --    KEV 8.6  --   --  8.2* 7.9*  --   --    GLC 85  --   --  94 99  --   --    ALBUMIN  --   --  2.3*  --  2.2*  --  2.5*   PROTTOTAL  --   --  5.6*  --  5.5*  --   --    BILITOTAL  --   --  0.5  --  0.8  --   --    ALKPHOS  --   --  169*  --  154*  --   --    ALT  --   --  69  --  95*  --   --    AST  --   --  43  --  91*  --   --     < > = values in this interval not displayed.       No results found for this or any previous visit (from the past 24 hour(s)).  Medications     - MEDICATION INSTRUCTIONS -       - MEDICATION INSTRUCTIONS -       - MEDICATION INSTRUCTIONS -         DULoxetine  60 mg Oral Daily     fluticasone-vilanterol  1 puff Inhalation Daily      furosemide  20 mg Oral Daily     lactulose  20 g Oral BID     mirtazapine  15 mg Oral At Bedtime     pantoprazole  40 mg Oral BID AC     polyethylene glycol  238 g Oral Once     sodium chloride (PF)  3 mL Intracatheter Q8H     spironolactone  50 mg Oral Daily

## 2020-03-16 NOTE — PLAN OF CARE
Patient is A/ox4. VSS on RA. Denied pain. CIWA 2 and 2 for mild bilateral hand tremor. PRN Atarax given x1 for anxiety. Abdomen firm and rigid. R paracentesis site CDI. Creatinine trending up, 1.45, scheduled diuretic doses decreased by hospitalist this morning. L shin scabbed over and AGUILA. R PIV SL. Independent in room and SBA in hallway. Sitter at bedside. Mandated court hold started this morning. Discharge pending. Nursing to continue to monitor.

## 2020-03-16 NOTE — PLAN OF CARE
A&Ox4. VSS on RA. Denies pain. Up independent. Sitter in room. On 72 hr hold. CIWA 3,2 for tremors.Plan for paracentesis. Discharge pending. Continue to monitor.

## 2020-03-16 NOTE — PLAN OF CARE
Pt A/Ox4, VSS on RA.  Denies pain.  Anxious and restless at times, PRN atarax helping.  4 loose bowel movements today, pt refusing laurel doses of lactulose. Abdomen distended, rigid, pt will most likely need paracentesis again. Ambulating in muniz with SBA, independent in room. Sitter at bedside. Court hold starts tomorrow am, nursing continue to monitor.

## 2020-03-17 LAB
ANION GAP SERPL CALCULATED.3IONS-SCNC: 4 MMOL/L (ref 3–14)
BUN SERPL-MCNC: 22 MG/DL (ref 7–30)
CALCIUM SERPL-MCNC: 8.8 MG/DL (ref 8.5–10.1)
CHLORIDE SERPL-SCNC: 102 MMOL/L (ref 94–109)
CO2 SERPL-SCNC: 26 MMOL/L (ref 20–32)
CREAT SERPL-MCNC: 1.41 MG/DL (ref 0.66–1.25)
GFR SERPL CREATININE-BSD FRML MDRD: 52 ML/MIN/{1.73_M2}
GLUCOSE SERPL-MCNC: 91 MG/DL (ref 70–99)
POTASSIUM SERPL-SCNC: 4 MMOL/L (ref 3.4–5.3)
SODIUM SERPL-SCNC: 132 MMOL/L (ref 133–144)

## 2020-03-17 PROCEDURE — 25000132 ZZH RX MED GY IP 250 OP 250 PS 637: Mod: GY | Performed by: STUDENT IN AN ORGANIZED HEALTH CARE EDUCATION/TRAINING PROGRAM

## 2020-03-17 PROCEDURE — 25000132 ZZH RX MED GY IP 250 OP 250 PS 637: Mod: GY | Performed by: INTERNAL MEDICINE

## 2020-03-17 PROCEDURE — 12000000 ZZH R&B MED SURG/OB

## 2020-03-17 PROCEDURE — 99231 SBSQ HOSP IP/OBS SF/LOW 25: CPT | Performed by: HOSPITALIST

## 2020-03-17 PROCEDURE — 36415 COLL VENOUS BLD VENIPUNCTURE: CPT | Performed by: HOSPITALIST

## 2020-03-17 PROCEDURE — 80048 BASIC METABOLIC PNL TOTAL CA: CPT | Performed by: HOSPITALIST

## 2020-03-17 PROCEDURE — 25000132 ZZH RX MED GY IP 250 OP 250 PS 637: Mod: GY | Performed by: HOSPITALIST

## 2020-03-17 RX ADMIN — PANTOPRAZOLE SODIUM 40 MG: 40 TABLET, DELAYED RELEASE ORAL at 07:53

## 2020-03-17 RX ADMIN — MIRTAZAPINE 15 MG: 15 TABLET, FILM COATED ORAL at 21:27

## 2020-03-17 RX ADMIN — PANTOPRAZOLE SODIUM 40 MG: 40 TABLET, DELAYED RELEASE ORAL at 16:54

## 2020-03-17 RX ADMIN — DULOXETINE 60 MG: 60 CAPSULE, DELAYED RELEASE ORAL at 07:46

## 2020-03-17 RX ADMIN — LACTULOSE 20 G: 10 SOLUTION ORAL at 07:54

## 2020-03-17 RX ADMIN — HYDROXYZINE HYDROCHLORIDE 25 MG: 25 TABLET, FILM COATED ORAL at 21:27

## 2020-03-17 RX ADMIN — FUROSEMIDE 20 MG: 20 TABLET ORAL at 07:47

## 2020-03-17 RX ADMIN — ACETAMINOPHEN 650 MG: 325 TABLET, FILM COATED ORAL at 12:05

## 2020-03-17 RX ADMIN — HYDROXYZINE HYDROCHLORIDE 25 MG: 25 TABLET, FILM COATED ORAL at 15:02

## 2020-03-17 RX ADMIN — SPIRONOLACTONE 50 MG: 25 TABLET ORAL at 07:46

## 2020-03-17 RX ADMIN — HYDROXYZINE HYDROCHLORIDE 25 MG: 25 TABLET, FILM COATED ORAL at 09:04

## 2020-03-17 RX ADMIN — TRAZODONE HYDROCHLORIDE 50 MG: 50 TABLET ORAL at 21:27

## 2020-03-17 ASSESSMENT — ACTIVITIES OF DAILY LIVING (ADL)
ADLS_ACUITY_SCORE: 11
ADLS_ACUITY_SCORE: 10.5

## 2020-03-17 NOTE — PROGRESS NOTES
DANIELE Note:    D/I:   received call from Niesha Rodrigez ([phone]) who is patient's court appointed .  Patient will have his preliminary hearing on 3/18/20 at 2:15 pm.   spoke with patient and he has decided to waive his prelimiary hearing and remain in the hospital until March 23, 2020, when his hearing will be held.  Patient will be able to be available by phone for preliminary hearing on 3/18.  Niesha requesting that patient sign waiver for preliminary hearing and faxed to DANIELE.  DANIELE had document signed by patient and returned back to Niesha.  Niesha will call patient tomorrow to be available by phone for the preliminary hearing at 2:15.    P: DANIELE will remain available to assist as needed.    JULIA Navarro, LGSW  [phone]  [facility]

## 2020-03-17 NOTE — PROGRESS NOTES
[facility]    Medicine Progress Note - Hospitalist Service       Date of Admission:  3/7/2020  Assessment & Plan   Jim Richard is a 64 year-old male with history of alcohol dependence, alcoholic cirrhosis with history of varices, depression, anxiety, coronary artery disease, obstructive sleep apnea, peripheral vascular disease, prior subdural hematoma who presents with alcohol intoxication and melena. Admitted 3/7/2020.      Melena  Gastrointestinal bleed likely secondary to esophagitis  anemia  H/o Esophageal varices  Acute on chronic blood loss anemia secondary to cirrhosis and GI bleed  Presents with one week hx of melena.  Baseline hemoglobin 9-10 g/dl recent, on admission Hgb 9.4 g/dl. Last EGD 09/2019 showed normal esophagus with portal hypertensive gastropathy, normal duodenum. Mildly tachycardic on admission otherwise blood pressure stable. Suspected slow upper GI bleed.  - GI ([facility]) consulted, EGD did not show any bleeding ulcers , noted to have grade 1 esophageal varices with no signs of bleeding,  colonoscopy 3/10 with diverticulosis, 2 polyps, congested mucosa  - Continue pantoprazole  BID as per GI   - Avoid anticoagulation  - transfuse <7, pt has been consented  - hgb stable, continue to monitor     Cirrhosis with ascites  Alcoholic hepatitis  PTA lactulose 10 g TID, propranolol 10 mg BID, furosemide 40 mg daily, spironolactone 50 mg bid  - Continue lactulose, titrate to 2 stools/day  - Ceftriaxone IV ordered for SBP prophylaxis in setting of GIB, paracentesis did not indicate SBP.  Culture negative as of 3/13. 5 day course completed 3/13  - restarted lasix 20 mg daily, aldactone 50 mg daily 3/12, dose doubled  on 3/15. However as creatinine trended up , decreased dose back again on 3/16  - s/p paracentesis x2 on 3/9 and 3/12 .  Ascitic fluid cultures negative.  -May need repeat paracentesis in next few days  -Monitor LFTs and renal function, remained stable    Depression with  hx of suicidal ideation  Anxiety  PTA duloxetine 60 mg daily, quetiapine 50 mg at bedtime PRN, trazodone 200 mg at bedtime, mirtazapine 15 mg at bedtime.   Poor medication compliance, he has not taken these meds for few weeks now. Psych had seen previous hospitalization. He denies ongoing SI in the ED.   - Psychiatry has seen, greatly appreciate assistance  - continue duloxetine, mirtazapine, prn trazodone  - prn atarax for anxiety  - pt on 72 hour hold by psychiatry 3/10, per , petition for commitment is going to be supported.  Court hearing is on 3/18.     Alcohol intoxication  Alcohol use disorder  Drinking up to 1-1.75 L vodka daily. BARNEY on admission at 0.29. Does have hx of DT's, but no h/o withdrawal seizures.   - IV vitamins  - CIWA protocol with lorazepam, none needed since am 9:30a 3/10  - Psychiatry consulted as above  - Chemical dependency has seen. Most recent evaluation 2/13, still valid for 30 days. Possibility of [facility] program for treatment  - under court commitment     JANIS on CPAP  Continue CPAP per home settings     COPD  Tobacco abuse  Stable  - Continue prior to admission inhalers    Thrombocytopenia secondary to cirrhosis  Improving 3/13    Diet: 2 Gram Sodium Diet  Snacks/Supplements Adult: Other; 10 am and 2pm: straw Boost  (RD); Between Meals    DVT Prophylaxis: Pneumatic Compression Devices  Leger Catheter: not present  Code Status: DNR/DNI      Disposition Plan   Expected discharge: Will remain under court commitment.  Court hearing is on 3/18.  Appreciate social work assistance.  Entered: Hanny Knowles MD 03/17/2020, 2:51 PM     The patient's care was discussed with the Bedside Nurse and Patient.    ______________________________________________________________________    Interval History   Stable overnight. abd feels distended but stable, not painful. Afebrile. Walking the hallways.  No complaints.  Had 1 stool this morning.    Data reviewed today: I reviewed  All medications, new  labs and imaging results over the last 24 hours. I personally reviewed no images or EKG's today.    Physical Exam   Vital Signs: Temp: 96.9  F (36.1  C) Temp src: Axillary BP: 125/56   Heart Rate: 57 Resp: 16 SpO2: 96 % O2 Device: None (Room air)    Weight: 0 lbs 0 oz    Constitutional: Awake, alert, cooperative, no apparent distress  Respiratory: Clear to auscultation bilaterally, no crackles or wheezing  Cardiovascular: Regular rate and rhythm, normal S1 and S2, and no murmur noted  GI: Normal bowel sounds,  distended and slightly firm, nontender  Skin/Integumen: No rashes, 1+ edema on L leg, none on R  Neuro : moving all 4 extremities, no focal deficit noted       Data   Recent Labs   Lab 03/17/20  0708 03/16/20  0718 03/15/20  0706 03/14/20  0702 03/13/20  0738 03/12/20  0720  03/11/20  0720   WBC  --   --   --   --  4.1  --   --  3.7*   HGB  --   --  9.1* 8.0* 9.1*  --    < > 8.6*   MCV  --   --   --   --  93  --   --  93   PLT  --   --   --   --  114*  --   --  83*   * 134  --   --  135 136   < >  --    POTASSIUM 4.0 4.1  --   --  3.4 3.3*   < >  --    CHLORIDE 102 104  --   --  106 108   < >  --    CO2 26 25  --   --  22 20   < >  --    BUN 22 20  --   --  12 15   < >  --    CR 1.41* 1.45*  --   --  1.21 1.04   < >  --    ANIONGAP 4 5  --   --  7 8   < >  --    KEV 8.8 8.6  --   --  8.2* 7.9*   < >  --    GLC 91 85  --   --  94 99   < >  --    ALBUMIN  --   --   --  2.3*  --  2.2*  --  2.5*   PROTTOTAL  --   --   --  5.6*  --  5.5*  --   --    BILITOTAL  --   --   --  0.5  --  0.8  --   --    ALKPHOS  --   --   --  169*  --  154*  --   --    ALT  --   --   --  69  --  95*  --   --    AST  --   --   --  43  --  91*  --   --     < > = values in this interval not displayed.       No results found for this or any previous visit (from the past 24 hour(s)).  Medications     - MEDICATION INSTRUCTIONS -       - MEDICATION INSTRUCTIONS -       - MEDICATION INSTRUCTIONS -         DULoxetine  60 mg Oral Daily      fluticasone-vilanterol  1 puff Inhalation Daily     furosemide  20 mg Oral Daily     lactulose  20 g Oral BID     mirtazapine  15 mg Oral At Bedtime     pantoprazole  40 mg Oral BID AC     polyethylene glycol  238 g Oral Once     sodium chloride (PF)  3 mL Intracatheter Q8H     spironolactone  50 mg Oral Daily

## 2020-03-17 NOTE — PROGRESS NOTES
Received county paperwork from [facility]'s office to serve pt with county court date. Paperwork placed in front of pt's chart.

## 2020-03-17 NOTE — PLAN OF CARE
VSS on room air. AO X4. CIWA score of 2 both times this shift with visibile tremor in the hands. Up in room and up to bathroom Independently. Patient walked 3 times today with the nurse. Patient currently has a long arm. Tolerating low sodium diet and fluids adequately. Had 3 BMs this shift and voiding. R PIV SL. C/O anxiety and managed with PRN oral atarax. C/O neck and back pain baseline, PRN oral tylenol given with no change in pain, patient declines any other pain medication or ice. Patient has a court hearing tomorrow and  will take the pt there. Nursing to continue monitoring. Discharge pending.

## 2020-03-17 NOTE — PLAN OF CARE
A/ox4, VSS on RA. Denies pain. Remains on court hold. Atarax for anxiety.  CIWA score 2. Pt has court date Weds March 18th @ 5094, will get ride to [facility] from [facility].  Sitter at bedside, up independent in room.  Continue to monitor.

## 2020-03-17 NOTE — PLAN OF CARE
A/O x 4. VSS on RA. Calm, cooperative. Atarax given x 1 for anxiety, Up ambulating independently ambulated in halls.  CIWA score 2 for tremors.  Denies pain. Abdomen distended. Good appetite. Daughter Aleksandra called for update - per patient it is ok to give her information, he asked for her name to be added to his contact list.  Plan for court trial tomorrow.

## 2020-03-17 NOTE — PLAN OF CARE
A/ox4, VSS on RA. Denies pain. Calm and cooperative. PRN atarax available for anxiety. Remains on court hold. CIWA score 2 and 1, tremor. AM BMP pending. Pt has court date Weds March 18th @ 1415, will get ride to [facility] from [place].  Sitter at bedside, up independent in room.  Continue to monitor.

## 2020-03-18 PROCEDURE — 99231 SBSQ HOSP IP/OBS SF/LOW 25: CPT | Performed by: HOSPITALIST

## 2020-03-18 PROCEDURE — 25000132 ZZH RX MED GY IP 250 OP 250 PS 637: Mod: GY | Performed by: INTERNAL MEDICINE

## 2020-03-18 PROCEDURE — 12000000 ZZH R&B MED SURG/OB

## 2020-03-18 PROCEDURE — 25000132 ZZH RX MED GY IP 250 OP 250 PS 637: Mod: GY | Performed by: HOSPITALIST

## 2020-03-18 PROCEDURE — 25000132 ZZH RX MED GY IP 250 OP 250 PS 637: Mod: GY | Performed by: STUDENT IN AN ORGANIZED HEALTH CARE EDUCATION/TRAINING PROGRAM

## 2020-03-18 RX ORDER — NICOTINE 21 MG/24HR
1 PATCH, TRANSDERMAL 24 HOURS TRANSDERMAL DAILY
Status: DISCONTINUED | OUTPATIENT
Start: 2020-03-18 | End: 2020-04-09 | Stop reason: HOSPADM

## 2020-03-18 RX ADMIN — LACTULOSE 20 G: 10 SOLUTION ORAL at 08:57

## 2020-03-18 RX ADMIN — SPIRONOLACTONE 50 MG: 25 TABLET ORAL at 07:49

## 2020-03-18 RX ADMIN — FUROSEMIDE 20 MG: 20 TABLET ORAL at 07:49

## 2020-03-18 RX ADMIN — HYDROXYZINE HYDROCHLORIDE 25 MG: 25 TABLET, FILM COATED ORAL at 14:48

## 2020-03-18 RX ADMIN — PANTOPRAZOLE SODIUM 40 MG: 40 TABLET, DELAYED RELEASE ORAL at 16:16

## 2020-03-18 RX ADMIN — HYDROXYZINE HYDROCHLORIDE 25 MG: 25 TABLET, FILM COATED ORAL at 08:57

## 2020-03-18 RX ADMIN — MIRTAZAPINE 15 MG: 15 TABLET, FILM COATED ORAL at 21:52

## 2020-03-18 RX ADMIN — HYDROXYZINE HYDROCHLORIDE 25 MG: 25 TABLET, FILM COATED ORAL at 21:52

## 2020-03-18 RX ADMIN — PANTOPRAZOLE SODIUM 40 MG: 40 TABLET, DELAYED RELEASE ORAL at 07:49

## 2020-03-18 RX ADMIN — TRAZODONE HYDROCHLORIDE 50 MG: 50 TABLET ORAL at 21:52

## 2020-03-18 RX ADMIN — NICOTINE 1 PATCH: 14 PATCH, EXTENDED RELEASE TRANSDERMAL at 16:16

## 2020-03-18 RX ADMIN — DULOXETINE 60 MG: 60 CAPSULE, DELAYED RELEASE ORAL at 07:48

## 2020-03-18 ASSESSMENT — ACTIVITIES OF DAILY LIVING (ADL)
ADLS_ACUITY_SCORE: 10.5
ADLS_ACUITY_SCORE: 10.5
ADLS_ACUITY_SCORE: 15
ADLS_ACUITY_SCORE: 10.5
ADLS_ACUITY_SCORE: 15
ADLS_ACUITY_SCORE: 15

## 2020-03-18 NOTE — PROGRESS NOTES
A&O. VSS. Up independently in room. CIWA q4h, scored 2 for hand tremors. Continent of bowel and bladder. Lactulose held for greater than 2 stools. PIV SL. Court 3/18/2020 at 1415. Continue to monitor.

## 2020-03-18 NOTE — PROGRESS NOTES
[facility]    Medicine Progress Note - Hospitalist Service       Date of Admission:  3/7/2020  Assessment & Plan   Jim Richard is a 64 year-old male with history of alcohol dependence, alcoholic cirrhosis with history of varices, depression, anxiety, coronary artery disease, obstructive sleep apnea, peripheral vascular disease, prior subdural hematoma who presents with alcohol intoxication and melena. Admitted 3/7/2020.      Melena  Gastrointestinal bleed likely secondary to esophagitis  anemia  H/o Esophageal varices  Acute on chronic blood loss anemia secondary to cirrhosis and GI bleed  Presents with one week hx of melena.  Baseline hemoglobin 9-10 g/dl recent, on admission Hgb 9.4 g/dl. Last EGD 09/2019 showed normal esophagus with portal hypertensive gastropathy, normal duodenum. Mildly tachycardic on admission otherwise blood pressure stable. Suspected slow upper GI bleed.  - GI ([facility]) consulted, EGD did not show any bleeding ulcers , noted to have grade 1 esophageal varices with no signs of bleeding,  colonoscopy 3/10 with diverticulosis, 2 polyps, congested mucosa  - Continue pantoprazole  BID as per GI   - Avoid anticoagulation  - transfuse <7, pt has been consented  - hgb stable, continue to monitor     Cirrhosis with ascites  Alcoholic hepatitis  PTA lactulose 10 g TID, propranolol 10 mg BID, furosemide 40 mg daily, spironolactone 50 mg bid  - Continue lactulose, titrate to 2 stools/day  - Ceftriaxone IV ordered for SBP prophylaxis in setting of GIB, paracentesis did not indicate SBP.  Culture negative as of 3/13. 5 day course completed 3/13  - restarted lasix 20 mg daily, aldactone 50 mg daily 3/12, dose doubled  on 3/15. However as creatinine trended up , decreased dose back again on 3/16  - s/p paracentesis x2 on 3/9 and 3/12 .  Ascitic fluid cultures negative.  -May need repeat paracentesis in next few days  -Monitor LFTs and renal function, remained stable    Depression with  hx of suicidal ideation  Anxiety  PTA duloxetine 60 mg daily, quetiapine 50 mg at bedtime PRN, trazodone 200 mg at bedtime, mirtazapine 15 mg at bedtime.   Poor medication compliance, he has not taken these meds for few weeks now. Psych had seen previous hospitalization. He denies ongoing SI in the ED.   - Psychiatry has seen, greatly appreciate assistance  - continue duloxetine, mirtazapine, prn trazodone  - prn atarax for anxiety  - pt on 72 hour hold by psychiatry 3/10, per , petition for commitment is going to be supported.  Court hearing is on 3/18.     Alcohol intoxication  Alcohol use disorder  Drinking up to 1-1.75 L vodka daily. BARNEY on admission at 0.29. Does have hx of DT's, but no h/o withdrawal seizures.   - IV vitamins  - CIWA protocol with lorazepam, none needed since am 9:30a 3/10  - Psychiatry consulted as above  - Chemical dependency has seen. Most recent evaluation 2/13, still valid for 30 days. Possibility of [place] program for treatment  - under court commitment     JANIS on CPAP  Continue CPAP per home settings     COPD  Tobacco abuse  Stable  - Continue prior to admission inhalers    Thrombocytopenia secondary to cirrhosis  Improving 3/13    Diet: 2 Gram Sodium Diet  Snacks/Supplements Adult: Other; 10 am and 2pm: straw Boost  (RD); Between Meals    DVT Prophylaxis: Pneumatic Compression Devices  Leger Catheter: not present  Code Status: DNR/DNI      Disposition Plan   Expected discharge: Will remain under court commitment.  Court hearing is on 3/18.  Appreciate social work assistance.  Entered: Hanny Knowles MD 03/18/2020, 11:25 AM     The patient's care was discussed with the Bedside Nurse and Patient.    ______________________________________________________________________    Interval History   Stable overnight. abd feels distended but stable, not painful. Afebrile. Walking the hallways.  No complaints.  Awaiting court hearing this afternoon.  Denies any complaints.    Data reviewed  today: I reviewed  All medications, new labs and imaging results over the last 24 hours. I personally reviewed no images or EKG's today.    Physical Exam   Vital Signs: Temp: 99.1  F (37.3  C) Temp src: Oral BP: 133/53 Pulse: 71 Heart Rate: 64 Resp: 16 SpO2: 95 % O2 Device: None (Room air)    Weight: 175 lbs 8 oz    Constitutional: Awake, alert, cooperative, no apparent distress  Respiratory: Nonlabored breathing  Cardiovascular: Regular rate and rhythm  GI: Normal bowel sounds,  distended and slightly firm, nontender  Skin/Integumen: No rashes, 1+ edema on L leg, none on R  Neuro : moving all 4 extremities, no focal deficit noted       Data   Recent Labs   Lab 03/17/20  0708 03/16/20  0718 03/15/20  0706 03/14/20  0702 03/13/20  0738 03/12/20  0720   WBC  --   --   --   --  4.1  --    HGB  --   --  9.1* 8.0* 9.1*  --    MCV  --   --   --   --  93  --    PLT  --   --   --   --  114*  --    * 134  --   --  135 136   POTASSIUM 4.0 4.1  --   --  3.4 3.3*   CHLORIDE 102 104  --   --  106 108   CO2 26 25  --   --  22 20   BUN 22 20  --   --  12 15   CR 1.41* 1.45*  --   --  1.21 1.04   ANIONGAP 4 5  --   --  7 8   KEV 8.8 8.6  --   --  8.2* 7.9*   GLC 91 85  --   --  94 99   ALBUMIN  --   --   --  2.3*  --  2.2*   PROTTOTAL  --   --   --  5.6*  --  5.5*   BILITOTAL  --   --   --  0.5  --  0.8   ALKPHOS  --   --   --  169*  --  154*   ALT  --   --   --  69  --  95*   AST  --   --   --  43  --  91*       No results found for this or any previous visit (from the past 24 hour(s)).  Medications     - MEDICATION INSTRUCTIONS -       - MEDICATION INSTRUCTIONS -       - MEDICATION INSTRUCTIONS -         DULoxetine  60 mg Oral Daily     fluticasone-vilanterol  1 puff Inhalation Daily     furosemide  20 mg Oral Daily     lactulose  20 g Oral BID     mirtazapine  15 mg Oral At Bedtime     pantoprazole  40 mg Oral BID AC     polyethylene glycol  238 g Oral Once     sodium chloride (PF)  3 mL Intracatheter Q8H      spironolactone  50 mg Oral Daily

## 2020-03-18 NOTE — PLAN OF CARE
A/O x 4. VSS. Denies pain. CIWA score of 3 for tremors and anxiety.  Atarax given x 2.  Up independently, ambulated in halls x 2. Had 1 large loose BM. Had preliminary court hearing over phone today at 2.  Another hearing over the phone is scheduled for 3/23.  Continue to monitor an support.

## 2020-03-18 NOTE — PROGRESS NOTES
CLINICAL NUTRITION SERVICES - REASSESSMENT NOTE            Future/Additional Recommendations:     Will continue to send a Boost supplement at 10am and 2pm     Malnutrition: (3/9)  % Weight Loss: unable to assess (last recorded wt was 2/25)  % Intake:  No decreased intake noted (pt denies any decreased po intake)  Subcutaneous Fat Loss:  None observed  Muscle Loss:  Temporal region - mild depletion  Fluid Retention: trace     Malnutrition Diagnosis: Patient does not meet two of the above criteria necessary for diagnosing malnutrition, however, suspect some risk of malnutrition 2' to ETOH and recent diarrhea/GIB        EVALUATION OF PROGRESS TOWARD GOALS   Diet:    2gm Na  Boost supplement at 10am and 2pm      Intake/Tolerance:    Chart reviewed  Attempted to contact pt via phone (per COVID-19 pt contact restriction) - no answer  Pt has been ordering full meals TID  Flowsheets reflect meal intake has been 100%      NEW FINDINGS:   Vitals:    03/18/20 0448   Weight: 79.6 kg (175 lb 8 oz)         Previous Goals (3/13):   Patient will continue to consume >/= 75% meals and supplements  Evaluation: Met    Previous Nutrition Diagnosis (3/13):   Increased nutrient needs related to H/O ETOH as evidenced by need for oral nutritional supplement between meals   Evaluation: Improving      CURRENT NUTRITION DIAGNOSIS  No nutrition diagnosis identified at this time     INTERVENTIONS  Recommendations / Nutrition Prescription  2gm Na  Nutrition supplements    Implementation  Will continue to send a Boost supplement at 10am and 2pm    Goals  Pt to consume 75%      MONITORING AND EVALUATION:  Progress towards goals will be monitored and evaluated per protocol and Practice Guidelines

## 2020-03-19 LAB
ANION GAP SERPL CALCULATED.3IONS-SCNC: 4 MMOL/L (ref 3–14)
BUN SERPL-MCNC: 28 MG/DL (ref 7–30)
CALCIUM SERPL-MCNC: 8.5 MG/DL (ref 8.5–10.1)
CHLORIDE SERPL-SCNC: 101 MMOL/L (ref 94–109)
CO2 SERPL-SCNC: 26 MMOL/L (ref 20–32)
CREAT SERPL-MCNC: 1.39 MG/DL (ref 0.66–1.25)
GFR SERPL CREATININE-BSD FRML MDRD: 52 ML/MIN/{1.73_M2}
GLUCOSE SERPL-MCNC: 121 MG/DL (ref 70–99)
POTASSIUM SERPL-SCNC: 4.2 MMOL/L (ref 3.4–5.3)
SODIUM SERPL-SCNC: 131 MMOL/L (ref 133–144)

## 2020-03-19 PROCEDURE — 12000000 ZZH R&B MED SURG/OB

## 2020-03-19 PROCEDURE — 25000132 ZZH RX MED GY IP 250 OP 250 PS 637: Mod: GY | Performed by: STUDENT IN AN ORGANIZED HEALTH CARE EDUCATION/TRAINING PROGRAM

## 2020-03-19 PROCEDURE — 25000132 ZZH RX MED GY IP 250 OP 250 PS 637: Mod: GY | Performed by: HOSPITALIST

## 2020-03-19 PROCEDURE — 80048 BASIC METABOLIC PNL TOTAL CA: CPT | Performed by: INTERNAL MEDICINE

## 2020-03-19 PROCEDURE — 25000132 ZZH RX MED GY IP 250 OP 250 PS 637: Mod: GY | Performed by: INTERNAL MEDICINE

## 2020-03-19 PROCEDURE — 99233 SBSQ HOSP IP/OBS HIGH 50: CPT | Performed by: INTERNAL MEDICINE

## 2020-03-19 PROCEDURE — 36415 COLL VENOUS BLD VENIPUNCTURE: CPT | Performed by: INTERNAL MEDICINE

## 2020-03-19 RX ORDER — SPIRONOLACTONE 25 MG/1
25 TABLET ORAL DAILY
Status: DISCONTINUED | OUTPATIENT
Start: 2020-03-20 | End: 2020-04-09 | Stop reason: HOSPADM

## 2020-03-19 RX ADMIN — HYDROXYZINE HYDROCHLORIDE 25 MG: 25 TABLET, FILM COATED ORAL at 03:49

## 2020-03-19 RX ADMIN — PANTOPRAZOLE SODIUM 40 MG: 40 TABLET, DELAYED RELEASE ORAL at 06:57

## 2020-03-19 RX ADMIN — TRAZODONE HYDROCHLORIDE 50 MG: 50 TABLET ORAL at 21:25

## 2020-03-19 RX ADMIN — MIRTAZAPINE 15 MG: 15 TABLET, FILM COATED ORAL at 21:25

## 2020-03-19 RX ADMIN — PANTOPRAZOLE SODIUM 40 MG: 40 TABLET, DELAYED RELEASE ORAL at 15:09

## 2020-03-19 RX ADMIN — DULOXETINE 60 MG: 60 CAPSULE, DELAYED RELEASE ORAL at 09:22

## 2020-03-19 RX ADMIN — NICOTINE 1 PATCH: 14 PATCH, EXTENDED RELEASE TRANSDERMAL at 09:24

## 2020-03-19 RX ADMIN — HYDROXYZINE HYDROCHLORIDE 25 MG: 25 TABLET, FILM COATED ORAL at 15:09

## 2020-03-19 RX ADMIN — HYDROXYZINE HYDROCHLORIDE 25 MG: 25 TABLET, FILM COATED ORAL at 21:25

## 2020-03-19 RX ADMIN — FLUTICASONE FUROATE AND VILANTEROL TRIFENATATE 1 PUFF: 200; 25 POWDER RESPIRATORY (INHALATION) at 09:29

## 2020-03-19 RX ADMIN — FUROSEMIDE 20 MG: 20 TABLET ORAL at 09:22

## 2020-03-19 RX ADMIN — LACTULOSE 20 G: 10 SOLUTION ORAL at 20:07

## 2020-03-19 RX ADMIN — SPIRONOLACTONE 50 MG: 25 TABLET ORAL at 09:22

## 2020-03-19 ASSESSMENT — ACTIVITIES OF DAILY LIVING (ADL)
ADLS_ACUITY_SCORE: 15

## 2020-03-19 NOTE — PLAN OF CARE
Pt. A/Ox4. VSS on RA. Denies pain. CIWAs  2. Atarax given x2. Up ind in room. Ambulated in halls x2. Nicotine patch L arm. Next court hearing over phone on Wed, March 23rd. Continue to monitor.

## 2020-03-19 NOTE — PLAN OF CARE
A/O x 4.  VSS on RA.  Denies pain.  Atarax given for anxiety x 1.  Up independently in room, ambulated SBA in halls. Nicotene patch on R. Arm. Had 2 loose BMs today. Abdomen distended, taught. Plan for paracentesis tomorrow around 10:30 am.  Continue to monitor.

## 2020-03-19 NOTE — PROGRESS NOTES
[facility]    Medicine Progress Note - Hospitalist Service       Date of Admission:  3/7/2020  Assessment & Plan   Jim Richard is a 64 year-old male with history of alcohol dependence, alcoholic cirrhosis with history of varices, depression, anxiety, coronary artery disease, obstructive sleep apnea, peripheral vascular disease, prior subdural hematoma who presents with alcohol intoxication and melena. Admitted 3/7/2020.      Melena  Gastrointestinal bleed likely secondary to esophagitis  anemia  H/o Esophageal varices  Acute on chronic blood loss anemia secondary to cirrhosis and GI bleed  Presents with one week hx of melena.  Baseline hemoglobin 9-10 g/dl recent, on admission Hgb 9.4 g/dl. Last EGD 09/2019 showed normal esophagus with portal hypertensive gastropathy, normal duodenum. Mildly tachycardic on admission otherwise blood pressure stable. Suspected slow upper GI bleed.  - GI ([facility]) consulted, EGD did not show any bleeding ulcers, noted to have grade 1 esophageal varices with no signs of bleeding. Colonoscopy 3/10 with diverticulosis, 2 polyps, congested mucosa  - Continue pantoprazole  BID as per GI   - Avoid anticoagulation  - transfuse <7, pt has been consented  - hgb stable, continue to monitor     Cirrhosis with ascites  Alcoholic hepatitis  PTA lactulose 10 g TID, propranolol 10 mg BID, furosemide 40 mg daily, spironolactone 50 mg bid  - Continue lactulose, titrate to 2 stools/day  - Ceftriaxone IV ordered for SBP prophylaxis in setting of GIB, paracentesis did not indicate SBP.  Culture negative. 5 day course completed 3/13  - restarted lasix 20 mg daily, aldactone 50 mg daily 3/12, dose doubled  on 3/15. However as creatinine trended up , decreased dose back again on 3/16  - s/p paracentesis x2 on 3/9 and 3/12 .  Ascitic fluid cultures negative.  -May need repeat paracentesis in next few days  -Monitor LFTs and renal function, repeat BMP stat to ensure renal function ok for  paracentesis  -decrease aldactone to 25 mg daily given mild NGUYEN  -plan for paracentesis 3/20    NGUYEN  Baseline creatinine appears to be ~0.9-1.2. elevated 3/16-17 to 1.4. BP's have been ok.   - will repeat x 1 now for possible paracentesis in am 3/20  - may need to back off on diuretics a bit    Depression with hx of suicidal ideation  Anxiety  PTA duloxetine 60 mg daily, quetiapine 50 mg at bedtime PRN, trazodone 200 mg at bedtime, mirtazapine 15 mg at bedtime.   Poor medication compliance, he has not taken these meds for few weeks now. Psych had seen previous hospitalization. He denies ongoing SI in the ED.   - Psychiatry has seen, greatly appreciate assistance  - continue duloxetine, mirtazapine, prn trazodone  - prn atarax for anxiety  - undergoing commitment proceedings with the [facility], s/p preliminary hearing 3/18. Next court hearing by phone on 3/23     Alcohol intoxication  Alcohol use disorder  Drinking up to 1-1.75 L vodka daily. BARNEY on admission at 0.29. Does have hx of DT's, but no h/o withdrawal seizures.   - IV vitamins  - CIWA protocol with lorazepam, none needed since am 9:30a 3/10  - Psychiatry consulted as above  - Chemical dependency has seen. Most recent evaluation 2/13, still valid for 30 days. Possibility of [place]'s program for treatment  - undergoing court commitment proceedings     JANIS on CPAP  Continue CPAP per home settings     COPD  Tobacco abuse  Stable  - Continue prior to admission inhalers    Thrombocytopenia secondary to cirrhosis  Improving last check 3/13  - repeat am 3/20     Diet: 2 Gram Sodium Diet  Snacks/Supplements Adult: Other; 10 am and 2pm: straw Boost  (RD); Between Meals    DVT Prophylaxis: Pneumatic Compression Devices  Leger Catheter: not present  Code Status: DNR/DNI      Disposition Plan   Expected discharge: Will remain under court commitment.  Court hearing is on 3/18.  Appreciate social work assistance.  Entered: Mark Gomez MD 03/19/2020, 2:48 PM     The  patient's care was discussed with the Bedside Nurse and Patient.    ______________________________________________________________________    Interval History   Overnight events reviewed. Denies cp/sob short of some sob 2/2 abdominal girth. Feels abdomen is distended but no pain.    Data reviewed today: I reviewed  All medications, new labs and imaging results over the last 24 hours. I personally reviewed no images or EKG's today.    Physical Exam   Vital Signs: Temp: 97.6  F (36.4  C) Temp src: Oral BP: 130/56 Pulse: 57 Heart Rate: 59 Resp: 16 SpO2: 98 % O2 Device: None (Room air)    Weight: 177 lbs 0 oz    Constitutional: Awake, alert, cooperative, no apparent distress  Respiratory: CBA B  Cardiovascular: Regular rate and rhythm, soft MICKY noted  GI: Normal bowel sounds,  distended and firm, nontender  Skin/Integumen: No rashes, 1+ edema on L leg, none on R  Neuro : moving all 4 extremities, no focal deficit noted       Data   Recent Labs   Lab 03/17/20  0708 03/16/20  0718 03/15/20  0706 03/14/20  0702 03/13/20  0738   WBC  --   --   --   --  4.1   HGB  --   --  9.1* 8.0* 9.1*   MCV  --   --   --   --  93   PLT  --   --   --   --  114*   * 134  --   --  135   POTASSIUM 4.0 4.1  --   --  3.4   CHLORIDE 102 104  --   --  106   CO2 26 25  --   --  22   BUN 22 20  --   --  12   CR 1.41* 1.45*  --   --  1.21   ANIONGAP 4 5  --   --  7   KEV 8.8 8.6  --   --  8.2*   GLC 91 85  --   --  94   ALBUMIN  --   --   --  2.3*  --    PROTTOTAL  --   --   --  5.6*  --    BILITOTAL  --   --   --  0.5  --    ALKPHOS  --   --   --  169*  --    ALT  --   --   --  69  --    AST  --   --   --  43  --        No results found for this or any previous visit (from the past 24 hour(s)).  Medications     - MEDICATION INSTRUCTIONS -       - MEDICATION INSTRUCTIONS -       - MEDICATION INSTRUCTIONS -         DULoxetine  60 mg Oral Daily     fluticasone-vilanterol  1 puff Inhalation Daily     furosemide  20 mg Oral Daily     lactulose  20  g Oral BID     mirtazapine  15 mg Oral At Bedtime     nicotine  1 patch Transdermal Daily     nicotine   Transdermal Q8H     pantoprazole  40 mg Oral BID AC     polyethylene glycol  238 g Oral Once     sodium chloride (PF)  3 mL Intracatheter Q8H     spironolactone  50 mg Oral Daily

## 2020-03-20 ENCOUNTER — APPOINTMENT (OUTPATIENT)
Dept: ULTRASOUND IMAGING | Facility: CLINIC | Age: 65
DRG: 392 | End: 2020-03-20
Attending: INTERNAL MEDICINE
Payer: MEDICARE

## 2020-03-20 LAB
ANION GAP SERPL CALCULATED.3IONS-SCNC: 4 MMOL/L (ref 3–14)
APPEARANCE FLD: CLEAR
BASOPHILS # BLD AUTO: 0.1 10E9/L (ref 0–0.2)
BASOPHILS NFR BLD AUTO: 1.9 %
BUN SERPL-MCNC: 27 MG/DL (ref 7–30)
CALCIUM SERPL-MCNC: 8.7 MG/DL (ref 8.5–10.1)
CHLORIDE SERPL-SCNC: 101 MMOL/L (ref 94–109)
CO2 SERPL-SCNC: 26 MMOL/L (ref 20–32)
COLOR FLD: YELLOW
CREAT SERPL-MCNC: 1.4 MG/DL (ref 0.66–1.25)
DIFFERENTIAL METHOD BLD: ABNORMAL
EOSINOPHIL # BLD AUTO: 0.1 10E9/L (ref 0–0.7)
EOSINOPHIL NFR BLD AUTO: 1.9 %
ERYTHROCYTE [DISTWIDTH] IN BLOOD BY AUTOMATED COUNT: 15.4 % (ref 10–15)
GFR SERPL CREATININE-BSD FRML MDRD: 52 ML/MIN/{1.73_M2}
GLUCOSE SERPL-MCNC: 85 MG/DL (ref 70–99)
HCT VFR BLD AUTO: 27.9 % (ref 40–53)
HGB BLD-MCNC: 9.4 G/DL (ref 13.3–17.7)
IMM GRANULOCYTES # BLD: 0 10E9/L (ref 0–0.4)
IMM GRANULOCYTES NFR BLD: 0.2 %
LYMPHOCYTES # BLD AUTO: 0.9 10E9/L (ref 0.8–5.3)
LYMPHOCYTES NFR BLD AUTO: 19.9 %
LYMPHOCYTES NFR FLD MANUAL: 41 %
MCH RBC QN AUTO: 31.1 PG (ref 26.5–33)
MCHC RBC AUTO-ENTMCNC: 33.7 G/DL (ref 31.5–36.5)
MCV RBC AUTO: 92 FL (ref 78–100)
MONOCYTES # BLD AUTO: 0.2 10E9/L (ref 0–1.3)
MONOCYTES NFR BLD AUTO: 4.3 %
MONOS+MACROS NFR FLD MANUAL: 36 %
NEUTROPHILS # BLD AUTO: 3.3 10E9/L (ref 1.6–8.3)
NEUTROPHILS NFR BLD AUTO: 71.8 %
NEUTS BAND NFR FLD MANUAL: 7 %
NRBC # BLD AUTO: 0 10*3/UL
NRBC BLD AUTO-RTO: 0 /100
OTHER CELLS FLD MANUAL: 16 %
PLATELET # BLD AUTO: 204 10E9/L (ref 150–450)
POTASSIUM SERPL-SCNC: 4.2 MMOL/L (ref 3.4–5.3)
RBC # BLD AUTO: 3.02 10E12/L (ref 4.4–5.9)
SODIUM SERPL-SCNC: 131 MMOL/L (ref 133–144)
SPECIMEN SOURCE FLD: NORMAL
WBC # BLD AUTO: 4.6 10E9/L (ref 4–11)
WBC # FLD AUTO: 227 /UL

## 2020-03-20 PROCEDURE — 25000132 ZZH RX MED GY IP 250 OP 250 PS 637: Mod: GY | Performed by: STUDENT IN AN ORGANIZED HEALTH CARE EDUCATION/TRAINING PROGRAM

## 2020-03-20 PROCEDURE — 12000000 ZZH R&B MED SURG/OB

## 2020-03-20 PROCEDURE — 80048 BASIC METABOLIC PNL TOTAL CA: CPT | Performed by: INTERNAL MEDICINE

## 2020-03-20 PROCEDURE — 25000132 ZZH RX MED GY IP 250 OP 250 PS 637: Mod: GY | Performed by: INTERNAL MEDICINE

## 2020-03-20 PROCEDURE — 25000132 ZZH RX MED GY IP 250 OP 250 PS 637: Mod: GY | Performed by: HOSPITALIST

## 2020-03-20 PROCEDURE — 49083 ABD PARACENTESIS W/IMAGING: CPT

## 2020-03-20 PROCEDURE — 40000863 ZZH STATISTIC RADIOLOGY XRAY, US, CT, MAR, NM

## 2020-03-20 PROCEDURE — 99232 SBSQ HOSP IP/OBS MODERATE 35: CPT | Performed by: INTERNAL MEDICINE

## 2020-03-20 PROCEDURE — 25000125 ZZHC RX 250: Performed by: STUDENT IN AN ORGANIZED HEALTH CARE EDUCATION/TRAINING PROGRAM

## 2020-03-20 PROCEDURE — 36415 COLL VENOUS BLD VENIPUNCTURE: CPT | Performed by: INTERNAL MEDICINE

## 2020-03-20 PROCEDURE — 89051 BODY FLUID CELL COUNT: CPT | Performed by: INTERNAL MEDICINE

## 2020-03-20 PROCEDURE — 85025 COMPLETE CBC W/AUTO DIFF WBC: CPT | Performed by: INTERNAL MEDICINE

## 2020-03-20 PROCEDURE — 0W9G3ZZ DRAINAGE OF PERITONEAL CAVITY, PERCUTANEOUS APPROACH: ICD-10-PCS | Performed by: RADIOLOGY

## 2020-03-20 RX ORDER — LIDOCAINE HYDROCHLORIDE 10 MG/ML
10 INJECTION, SOLUTION EPIDURAL; INFILTRATION; INTRACAUDAL; PERINEURAL ONCE
Status: COMPLETED | OUTPATIENT
Start: 2020-03-20 | End: 2020-03-20

## 2020-03-20 RX ORDER — ALBUMIN (HUMAN) 12.5 G/50ML
12.5 SOLUTION INTRAVENOUS ONCE
Status: DISCONTINUED | OUTPATIENT
Start: 2020-03-20 | End: 2020-03-25

## 2020-03-20 RX ADMIN — HYDROXYZINE HYDROCHLORIDE 25 MG: 25 TABLET, FILM COATED ORAL at 14:32

## 2020-03-20 RX ADMIN — HYDROXYZINE HYDROCHLORIDE 25 MG: 25 TABLET, FILM COATED ORAL at 21:35

## 2020-03-20 RX ADMIN — DULOXETINE 60 MG: 60 CAPSULE, DELAYED RELEASE ORAL at 08:01

## 2020-03-20 RX ADMIN — LIDOCAINE HYDROCHLORIDE 10 ML: 10 INJECTION, SOLUTION EPIDURAL; INFILTRATION; INTRACAUDAL; PERINEURAL at 10:27

## 2020-03-20 RX ADMIN — LACTULOSE 20 G: 10 SOLUTION ORAL at 08:01

## 2020-03-20 RX ADMIN — LACTULOSE 20 G: 10 SOLUTION ORAL at 21:35

## 2020-03-20 RX ADMIN — SPIRONOLACTONE 25 MG: 25 TABLET ORAL at 08:01

## 2020-03-20 RX ADMIN — PANTOPRAZOLE SODIUM 40 MG: 40 TABLET, DELAYED RELEASE ORAL at 06:52

## 2020-03-20 RX ADMIN — PANTOPRAZOLE SODIUM 40 MG: 40 TABLET, DELAYED RELEASE ORAL at 15:44

## 2020-03-20 RX ADMIN — NICOTINE 1 PATCH: 14 PATCH, EXTENDED RELEASE TRANSDERMAL at 08:01

## 2020-03-20 RX ADMIN — TRAZODONE HYDROCHLORIDE 50 MG: 50 TABLET ORAL at 21:38

## 2020-03-20 RX ADMIN — MIRTAZAPINE 15 MG: 15 TABLET, FILM COATED ORAL at 21:35

## 2020-03-20 RX ADMIN — FUROSEMIDE 20 MG: 20 TABLET ORAL at 08:01

## 2020-03-20 ASSESSMENT — ACTIVITIES OF DAILY LIVING (ADL)
ADLS_ACUITY_SCORE: 15

## 2020-03-20 NOTE — PLAN OF CARE
AOx4. VSS. No c/o pain. Atarax given x1. BS: active, abd distended. Paracentesis today, removed 3L. CIWA q4h, 2, discontinued. Excoriated/dry/flakey linda area- pt applying barrier cream. Nicotine patch on L arm. 2gm NA diet, good appetite. PIV SL. Up independent, SBA in muniz. Ambulated muniz x2. Continue to monitor.

## 2020-03-20 NOTE — PROGRESS NOTES
Care Suites Procedure Nursing Note    Patient Information  Name: Jim Richard  Age: 65 year old    Procedure  Procedure: paracentesis  Procedure start time: 1035  Procedure complete time: 1057  Concerns/abnormal assessment: none  If abnormal assessment, provider notified: N/A  Plan/Other: transport pt back to 802 per cart per pt's sitter. Will call report to pt's nurse on station 88.   3000cc yellow fluid removed per Dr. Vanegas. Pt tolerated procedure well.     Padmini Zepeda RN

## 2020-03-20 NOTE — PROGRESS NOTES
[facility]    Medicine Progress Note - Hospitalist Service       Date of Admission:  3/7/2020  Assessment & Plan   Jim Richard is a 64 year-old male with history of alcohol dependence, alcoholic cirrhosis with history of varices, depression, anxiety, coronary artery disease, obstructive sleep apnea, peripheral vascular disease, prior subdural hematoma who presents with alcohol intoxication and melena. Admitted 3/7/2020.      Cirrhosis with ascites  Alcoholic hepatitis  hyponatremia  PTA lactulose 10 g TID, propranolol 10 mg BID, furosemide 40 mg daily, spironolactone 50 mg bid  - Continue lactulose, titrate to 2 stools/day  - Ceftriaxone IV ordered for SBP prophylaxis in setting of GIB, paracentesis did not indicate SBP.  Culture negative. 5 day course completed 3/13  - restarted lasix 20 mg daily, aldactone 50 mg daily 3/12, dose doubled  on 3/15. However as creatinine trended up , decreased dose back again on 3/16.  - s/p paracentesis x 2 on 3/9 and 3/12 .  Ascitic fluid cultures negative.  -Monitor LFTs and renal function per routine  -decrease aldactone to 25 mg daily given mild NGUYEN 3/19  -paracentesis 3/20   -monitoring hyponatremia, presumed 2/2 cirrhosis    NGUYEN  Baseline creatinine appears to be ~0.9-1.2. elevated 3/16-17 to 1.4. BP's have been ok. No obvious nephrotoxins administered, no contrast. Possible HRS type II  - avoid nephrotoxins  - monitor closely for midodrine/ octreotide start  - monitor renal function closely    Melena  Gastrointestinal bleed likely secondary to esophagitis  anemia  H/o Esophageal varices  Acute on chronic blood loss anemia secondary to cirrhosis and GI bleed  Presents with one week hx of melena.  Baseline hemoglobin 9-10 g/dl recent, on admission Hgb 9.4 g/dl. Last EGD 09/2019 showed normal esophagus with portal hypertensive gastropathy, normal duodenum. Mildly tachycardic on admission otherwise blood pressure stable. Suspected slow upper GI bleed.  -  GI ([facility]) consulted, EGD did not show any bleeding ulcers, noted to have grade 1 esophageal varices with no signs of bleeding. Colonoscopy 3/10 with diverticulosis, 2 polyps, congested mucosa  - Continue pantoprazole  BID as per GI   - Avoid anticoagulation  - transfuse <7, pt has been consented  - hgb stable 3/20, continue to monitor    Depression with hx of suicidal ideation  Anxiety  PTA duloxetine 60 mg daily, quetiapine 50 mg at bedtime PRN, trazodone 200 mg at bedtime, mirtazapine 15 mg at bedtime.   Poor medication compliance, he has not taken these meds for few weeks now. Psych had seen previous hospitalization. He denies ongoing SI in the ED.   - Psychiatry has seen, greatly appreciate assistance  - continue duloxetine, mirtazapine, prn trazodone  - prn atarax for anxiety  - undergoing commitment proceedings with the county, s/p preliminary hearing 3/18. Next court hearing by phone on 3/23     Alcohol intoxication  Alcohol use disorder  Drinking up to 1-1.75 L vodka daily. BARNEY on admission at 0.29. Does have hx of DT's, but no h/o withdrawal seizures.   - IV vitamins given  - withdrawal completed  - Psychiatry consulted as above  - Chemical dependency has seen. Most recent evaluation 2/13, still valid for 30 days. Possibility of [facility] program for treatment  - undergoing court commitment proceedings     JANIS on CPAP  Uses at home, declines here. Will order at discharge     COPD  Tobacco abuse  Stable  - Continue prior to admission inhalers    Thrombocytopenia secondary to cirrhosis  Improving last check 3/13  - platelets normalized 3/20    Diet: 2 Gram Sodium Diet  Snacks/Supplements Adult: Other; 10 am and 2pm: straw Boost  (RD); Between Meals    DVT Prophylaxis: Pneumatic Compression Devices  Leger Catheter: not present  Code Status: DNR/DNI      Disposition Plan   Expected discharge: Will remain under court commitment.  Court hearing is on 3/18.  Appreciate social work assistance.  Entered: Mark MCMILLAN  MD Jason 03/20/2020, 7:53 AM     The patient's care was discussed with the Bedside Nurse and Patient.    ______________________________________________________________________    Interval History   Overnight events reviewed. Doing well. Denies pain c/o. Reports good UOP. No sob. No abdominal pain.    Data reviewed today: I reviewed  All medications, new labs and imaging results over the last 24 hours. I personally reviewed no images or EKG's today.    Physical Exam   Vital Signs: Temp: 98.3  F (36.8  C) Temp src: Oral BP: (!) 140/53 Pulse: 57 Heart Rate: 69 Resp: 16 SpO2: 94 % O2 Device: None (Room air)    Weight: 177 lbs 0 oz    Constitutional: Awake, alert, cooperative, no apparent distress  Respiratory: CBA B  Cardiovascular: Regular rate and rhythm, soft MICKY noted  GI: Normal bowel sounds,  distended and firm, nontender  Skin/Integumen: No rashes, tr edema on L  Neuro : moving all 4 extremities, no focal deficit noted       Data   Recent Labs   Lab 03/20/20  0651 03/19/20  1532 03/17/20  0708  03/15/20  0706 03/14/20  0702   WBC 4.6  --   --   --   --   --    HGB 9.4*  --   --   --  9.1* 8.0*   MCV 92  --   --   --   --   --      --   --   --   --   --    * 131* 132*   < >  --   --    POTASSIUM 4.2 4.2 4.0   < >  --   --    CHLORIDE 101 101 102   < >  --   --    CO2 26 26 26   < >  --   --    BUN 27 28 22   < >  --   --    CR 1.40* 1.39* 1.41*   < >  --   --    ANIONGAP 4 4 4   < >  --   --    KEV 8.7 8.5 8.8   < >  --   --    GLC 85 121* 91   < >  --   --    ALBUMIN  --   --   --   --   --  2.3*   PROTTOTAL  --   --   --   --   --  5.6*   BILITOTAL  --   --   --   --   --  0.5   ALKPHOS  --   --   --   --   --  169*   ALT  --   --   --   --   --  69   AST  --   --   --   --   --  43    < > = values in this interval not displayed.       No results found for this or any previous visit (from the past 24 hour(s)).  Medications     - MEDICATION INSTRUCTIONS -       - MEDICATION INSTRUCTIONS -        - MEDICATION INSTRUCTIONS -         DULoxetine  60 mg Oral Daily     fluticasone-vilanterol  1 puff Inhalation Daily     furosemide  20 mg Oral Daily     lactulose  20 g Oral BID     mirtazapine  15 mg Oral At Bedtime     nicotine  1 patch Transdermal Daily     nicotine   Transdermal Q8H     pantoprazole  40 mg Oral BID AC     polyethylene glycol  238 g Oral Once     sodium chloride (PF)  3 mL Intracatheter Q8H     spironolactone  25 mg Oral Daily

## 2020-03-20 NOTE — PROGRESS NOTES
A&O. VSS. Up IND in room. 2g NA diet. Continent of bowel and bladder. Excoriated/dry/flakey linda area- applying barrier cream per patient. CIWA q4h. PIV SL. Continue to monitor.

## 2020-03-21 LAB
ANION GAP SERPL CALCULATED.3IONS-SCNC: 6 MMOL/L (ref 3–14)
BUN SERPL-MCNC: 25 MG/DL (ref 7–30)
CALCIUM SERPL-MCNC: 8.8 MG/DL (ref 8.5–10.1)
CHLORIDE SERPL-SCNC: 101 MMOL/L (ref 94–109)
CO2 SERPL-SCNC: 24 MMOL/L (ref 20–32)
CREAT SERPL-MCNC: 1.42 MG/DL (ref 0.66–1.25)
GFR SERPL CREATININE-BSD FRML MDRD: 51 ML/MIN/{1.73_M2}
GLUCOSE SERPL-MCNC: 83 MG/DL (ref 70–99)
POTASSIUM SERPL-SCNC: 4.2 MMOL/L (ref 3.4–5.3)
SODIUM SERPL-SCNC: 131 MMOL/L (ref 133–144)

## 2020-03-21 PROCEDURE — 25000132 ZZH RX MED GY IP 250 OP 250 PS 637: Mod: GY | Performed by: HOSPITALIST

## 2020-03-21 PROCEDURE — 99232 SBSQ HOSP IP/OBS MODERATE 35: CPT | Performed by: INTERNAL MEDICINE

## 2020-03-21 PROCEDURE — 12000000 ZZH R&B MED SURG/OB

## 2020-03-21 PROCEDURE — 25000132 ZZH RX MED GY IP 250 OP 250 PS 637: Mod: GY | Performed by: INTERNAL MEDICINE

## 2020-03-21 PROCEDURE — 80048 BASIC METABOLIC PNL TOTAL CA: CPT | Performed by: INTERNAL MEDICINE

## 2020-03-21 PROCEDURE — 36415 COLL VENOUS BLD VENIPUNCTURE: CPT | Performed by: INTERNAL MEDICINE

## 2020-03-21 PROCEDURE — 25000132 ZZH RX MED GY IP 250 OP 250 PS 637: Mod: GY | Performed by: STUDENT IN AN ORGANIZED HEALTH CARE EDUCATION/TRAINING PROGRAM

## 2020-03-21 RX ORDER — LACTULOSE 10 G/15ML
10 SOLUTION ORAL 2 TIMES DAILY
Status: DISCONTINUED | OUTPATIENT
Start: 2020-03-21 | End: 2020-04-09 | Stop reason: HOSPADM

## 2020-03-21 RX ADMIN — FUROSEMIDE 20 MG: 20 TABLET ORAL at 08:01

## 2020-03-21 RX ADMIN — PANTOPRAZOLE SODIUM 40 MG: 40 TABLET, DELAYED RELEASE ORAL at 08:01

## 2020-03-21 RX ADMIN — NICOTINE 1 PATCH: 14 PATCH, EXTENDED RELEASE TRANSDERMAL at 08:00

## 2020-03-21 RX ADMIN — HYDROXYZINE HYDROCHLORIDE 25 MG: 25 TABLET, FILM COATED ORAL at 12:46

## 2020-03-21 RX ADMIN — HYDROXYZINE HYDROCHLORIDE 25 MG: 25 TABLET, FILM COATED ORAL at 21:04

## 2020-03-21 RX ADMIN — PANTOPRAZOLE SODIUM 40 MG: 40 TABLET, DELAYED RELEASE ORAL at 16:22

## 2020-03-21 RX ADMIN — ACETAMINOPHEN 650 MG: 325 TABLET, FILM COATED ORAL at 16:22

## 2020-03-21 RX ADMIN — LACTULOSE 20 G: 10 SOLUTION ORAL at 08:00

## 2020-03-21 RX ADMIN — DULOXETINE 60 MG: 60 CAPSULE, DELAYED RELEASE ORAL at 08:00

## 2020-03-21 RX ADMIN — SPIRONOLACTONE 25 MG: 25 TABLET ORAL at 08:00

## 2020-03-21 RX ADMIN — LACTULOSE 10 G: 10 SOLUTION ORAL at 21:04

## 2020-03-21 RX ADMIN — MIRTAZAPINE 15 MG: 15 TABLET, FILM COATED ORAL at 21:04

## 2020-03-21 RX ADMIN — ACETAMINOPHEN 650 MG: 325 TABLET, FILM COATED ORAL at 21:04

## 2020-03-21 RX ADMIN — ACETAMINOPHEN 650 MG: 325 TABLET, FILM COATED ORAL at 02:33

## 2020-03-21 ASSESSMENT — ACTIVITIES OF DAILY LIVING (ADL)
ADLS_ACUITY_SCORE: 15

## 2020-03-21 NOTE — PLAN OF CARE
Pt a&ox4, VSS on RA, independent in room, SBA in hallway, IVSL, 2 g sodium diet, denies pain, atarax given x1, paracentesis today - removed 3L, dry/flakey linda area - pt applies barrier cream. Continue to monitor

## 2020-03-21 NOTE — PLAN OF CARE
A&Ox4.  VSS, RA. Complained of low back pain; relieved with PRN Tylenol.  Paracentesis site, covered, CDI.  PIV SL.  Nicotine patch to L arm.  Tolerating 2 gram Na diet without problems.  1:1 sitter at the bedside due to court ordered hold.  Up independent in room; SBA in muniz.  Discharge pending.

## 2020-03-21 NOTE — PROGRESS NOTES
[facility]    Medicine Progress Note - Hospitalist Service       Date of Admission:  3/7/2020  Assessment & Plan   Jim Richard is a 64 year-old male with history of alcohol dependence, alcoholic cirrhosis with history of varices, depression, anxiety, coronary artery disease, obstructive sleep apnea, peripheral vascular disease, prior subdural hematoma who presents with alcohol intoxication and melena. Admitted 3/7/2020.      Cirrhosis with ascites  Alcoholic hepatitis  Hyponatremia, likely 2/2 cirrhosis/ liver failure  PTA lactulose 10 g TID, propranolol 10 mg BID, furosemide 40 mg daily, spironolactone 50 mg bid  - Continue lactulose, titrate to 2 stools/day  - Ceftriaxone IV ordered for SBP prophylaxis in setting of GIB, paracentesis did not indicate SBP.  Culture negative. 5 day course completed 3/13  - lasix 20 mg daily  - aldactone 50 mg daily 3/12, difficult with increase in dose 2/2 worsening creatinine, currently at 25 mg daily started 3/20  - s/p paracentesis 3/20 (3rd one this hospital stay), 3L removed  - Monitor LFTs and renal function per routine  - monitoring hyponatremia, stable 3/21    NGUYEN  Baseline creatinine appears to be ~0.9-1.2. elevated 3/16-17 to 1.4. BP's have been ok. No obvious nephrotoxins administered, no contrast. Possible HRS type II  - avoid nephrotoxins  - monitor closely for midodrine/ octreotide start  - stable 1.42 on 3/21    Melena  Gastrointestinal bleed likely secondary to esophagitis  anemia  H/o Esophageal varices  Acute on chronic blood loss anemia secondary to cirrhosis and GI bleed  Presents with one week hx of melena.  Baseline hemoglobin 9-10 g/dl recent, on admission Hgb 9.4 g/dl. Last EGD 09/2019 showed normal esophagus with portal hypertensive gastropathy, normal duodenum. Mildly tachycardic on admission otherwise blood pressure stable. Suspected slow upper GI bleed.  - GI ([facility]) consulted, EGD 3/7 did not show any bleeding ulcers, noted to  have grade 1 esophageal varices with no signs of bleeding. Colonoscopy 3/10 with diverticulosis, 2 polyps, congested mucosa  - Continue pantoprazole BID as per GI   - Avoid anticoagulation  - transfuse <7, pt has been consented  - hgb stable 3/20, continue to monitor    Depression with hx of suicidal ideation  Anxiety  PTA duloxetine 60 mg daily, quetiapine 50 mg at bedtime PRN, trazodone 200 mg at bedtime, mirtazapine 15 mg at bedtime.   Poor medication compliance, he has not taken these meds for few weeks now. Psych had seen previous hospitalization. He denies ongoing SI in the ED.   - Psychiatry has seen, greatly appreciate assistance  - continue duloxetine, mirtazapine, prn trazodone  - prn atarax for anxiety  - undergoing commitment proceedings with the county, s/p preliminary hearing 3/18. Next court hearing by phone on 3/23     Alcohol intoxication  Alcohol use disorder  Drinking up to 1-1.75 L vodka daily. BARNEY on admission at 0.29. Does have hx of DT's, but no h/o withdrawal seizures.   - IV vitamins given  - withdrawal completed  - Psychiatry consulted as above  - Chemical dependency has seen. Most recent evaluation 2/13, still valid for 30 days. Possibility of [facility] program for treatment  - undergoing court commitment proceedings     JANIS on CPAP  Uses at home, declines here. Will order at discharge     COPD  Tobacco abuse  Stable  - Continue prior to admission inhalers    Thrombocytopenia secondary to cirrhosis  Improving last check 3/13  - platelets normalized 3/20    Diet: 2 Gram Sodium Diet  Snacks/Supplements Adult: Other; 10 am and 2pm: straw Boost  (RD); Between Meals    DVT Prophylaxis: Pneumatic Compression Devices  Leger Catheter: not present  Code Status: DNR/DNI      Disposition Plan   Expected discharge: Will remain under court commitment.  Court hearing is on 3/23.  Appreciate social work assistance.  Entered: Mark Gomez MD 03/21/2020, 1:18 PM     The patient's care was discussed  with the Bedside Nurse and Patient.    ______________________________________________________________________    Interval History   Overnight events reviewed. Biggest c/o is diarrhea, having 4-6 BM daily. Also s/p paracentesis 3/20 with 3L out. Denies other cp/sob.     Data reviewed today: I reviewed  All medications, new labs and imaging results over the last 24 hours. I personally reviewed no images or EKG's today.    Physical Exam   Vital Signs: Temp: 98.6  F (37  C) Temp src: Oral BP: 112/58   Heart Rate: 67 Resp: 16 SpO2: 96 % O2 Device: None (Room air)    Weight: 177 lbs 0 oz    Constitutional: Awake, alert, cooperative, no apparent distress  Respiratory: CTA B  Cardiovascular: Regular rate and rhythm, MICKY noted  GI: Normal bowel sounds,  distended and firm, nontender  Skin/Integumen: No rashes, no edema noted  Neuro : moving all 4 extremities, no focal deficit noted       Data   Recent Labs   Lab 03/21/20  0717 03/20/20  0651 03/19/20  1532  03/15/20  0706   WBC  --  4.6  --   --   --    HGB  --  9.4*  --   --  9.1*   MCV  --  92  --   --   --    PLT  --  204  --   --   --    * 131* 131*   < >  --    POTASSIUM 4.2 4.2 4.2   < >  --    CHLORIDE 101 101 101   < >  --    CO2 24 26 26   < >  --    BUN 25 27 28   < >  --    CR 1.42* 1.40* 1.39*   < >  --    ANIONGAP 6 4 4   < >  --    KEV 8.8 8.7 8.5   < >  --    GLC 83 85 121*   < >  --     < > = values in this interval not displayed.       No results found for this or any previous visit (from the past 24 hour(s)).  Medications     - MEDICATION INSTRUCTIONS -       - MEDICATION INSTRUCTIONS -       - MEDICATION INSTRUCTIONS -       - MEDICATION INSTRUCTIONS -         albumin human  12.5 g Intravenous Once     DULoxetine  60 mg Oral Daily     fluticasone-vilanterol  1 puff Inhalation Daily     furosemide  20 mg Oral Daily     lactulose  20 g Oral BID     mirtazapine  15 mg Oral At Bedtime     nicotine  1 patch Transdermal Daily     nicotine   Transdermal Q8H      pantoprazole  40 mg Oral BID AC     polyethylene glycol  238 g Oral Once     sodium chloride (PF)  3 mL Intracatheter Q8H     spironolactone  25 mg Oral Daily

## 2020-03-21 NOTE — PLAN OF CARE
AOx4. VSS, RA. C/o back pain, tylenol given x1. Paracentesis site, band aide, CDI. Low NA diet, good appetite. Up independent in room, SBA in muniz d/t court hold. Ambulated muniz x2. Multiple stools, decrease in lactulose. R PIV SL. Plan pending. Continue to monitor.

## 2020-03-22 PROCEDURE — 25000132 ZZH RX MED GY IP 250 OP 250 PS 637: Mod: GY | Performed by: INTERNAL MEDICINE

## 2020-03-22 PROCEDURE — 99232 SBSQ HOSP IP/OBS MODERATE 35: CPT | Performed by: INTERNAL MEDICINE

## 2020-03-22 PROCEDURE — 25000132 ZZH RX MED GY IP 250 OP 250 PS 637: Mod: GY | Performed by: HOSPITALIST

## 2020-03-22 PROCEDURE — 12000000 ZZH R&B MED SURG/OB

## 2020-03-22 PROCEDURE — 99207 ZZC CDG-MDM COMPONENT: MEETS MODERATE - UP CODED: CPT | Performed by: INTERNAL MEDICINE

## 2020-03-22 PROCEDURE — 25000132 ZZH RX MED GY IP 250 OP 250 PS 637: Mod: GY | Performed by: STUDENT IN AN ORGANIZED HEALTH CARE EDUCATION/TRAINING PROGRAM

## 2020-03-22 RX ADMIN — PANTOPRAZOLE SODIUM 40 MG: 40 TABLET, DELAYED RELEASE ORAL at 06:45

## 2020-03-22 RX ADMIN — MIRTAZAPINE 15 MG: 15 TABLET, FILM COATED ORAL at 21:11

## 2020-03-22 RX ADMIN — LACTULOSE 10 G: 10 SOLUTION ORAL at 08:01

## 2020-03-22 RX ADMIN — DULOXETINE 60 MG: 60 CAPSULE, DELAYED RELEASE ORAL at 08:01

## 2020-03-22 RX ADMIN — HYDROXYZINE HYDROCHLORIDE 25 MG: 25 TABLET, FILM COATED ORAL at 14:54

## 2020-03-22 RX ADMIN — ACETAMINOPHEN 650 MG: 325 TABLET, FILM COATED ORAL at 21:11

## 2020-03-22 RX ADMIN — HYDROXYZINE HYDROCHLORIDE 25 MG: 25 TABLET, FILM COATED ORAL at 21:11

## 2020-03-22 RX ADMIN — PANTOPRAZOLE SODIUM 40 MG: 40 TABLET, DELAYED RELEASE ORAL at 15:56

## 2020-03-22 RX ADMIN — NICOTINE 1 PATCH: 14 PATCH, EXTENDED RELEASE TRANSDERMAL at 08:01

## 2020-03-22 RX ADMIN — SPIRONOLACTONE 25 MG: 25 TABLET ORAL at 08:01

## 2020-03-22 RX ADMIN — FUROSEMIDE 20 MG: 20 TABLET ORAL at 08:01

## 2020-03-22 RX ADMIN — TRAZODONE HYDROCHLORIDE 50 MG: 50 TABLET ORAL at 21:55

## 2020-03-22 RX ADMIN — LACTULOSE 10 G: 10 SOLUTION ORAL at 21:11

## 2020-03-22 RX ADMIN — ACETAMINOPHEN 650 MG: 325 TABLET, FILM COATED ORAL at 05:04

## 2020-03-22 ASSESSMENT — ACTIVITIES OF DAILY LIVING (ADL)
ADLS_ACUITY_SCORE: 15

## 2020-03-22 NOTE — PROGRESS NOTES
[facility]    Medicine Progress Note - Hospitalist Service       Date of Admission:  3/7/2020  Assessment & Plan   Jim Richard is a 64 year-old male with history of alcohol dependence, alcoholic cirrhosis with history of varices, depression, anxiety, coronary artery disease, obstructive sleep apnea, peripheral vascular disease, prior subdural hematoma who presents with alcohol intoxication and melena. Admitted 3/7/2020.      Cirrhosis with ascites  Alcoholic hepatitis  Hyponatremia, likely 2/2 cirrhosis/ liver failure  PTA lactulose 10 g TID, propranolol 10 mg BID, furosemide 40 mg daily, spironolactone 50 mg bid  - Continue lactulose, titrate to 2 stools/day  - Ceftriaxone IV ordered for SBP prophylaxis in setting of GIB, paracentesis did not indicate SBP.  Culture negative. 5 day course completed 3/13  - lasix 20 mg daily  - aldactone 50 mg daily 3/12, difficult with increase in dose 2/2 worsening creatinine, currently at 25 mg daily started 3/20  - s/p paracentesis 3/20 (3rd one this hospital stay), 3L removed  - Monitor LFTs and renal function per routine  - monitoring hyponatremia, stable 3/21  - repeat labs in the am    NGUYEN  Baseline creatinine appears to be ~0.9-1.2. elevated 3/16-17 to 1.4. BP's have been ok. No obvious nephrotoxins administered, no contrast. Possible HRS type II  - avoid nephrotoxins  - monitor closely for midodrine/ octreotide start  - stable 1.42 on 3/21  - repeat labs in the am    Melena  Gastrointestinal bleed likely secondary to esophagitis  anemia  H/o Esophageal varices  Acute on chronic blood loss anemia secondary to cirrhosis and GI bleed  Presents with one week hx of melena.  Baseline hemoglobin 9-10 g/dl recent, on admission Hgb 9.4 g/dl. Last EGD 09/2019 showed normal esophagus with portal hypertensive gastropathy, normal duodenum. Mildly tachycardic on admission otherwise blood pressure stable. Suspected slow upper GI bleed.  - GI ([facility]) consulted, EGD  "3/7 did not show any bleeding ulcers, noted to have grade 1 esophageal varices with no signs of bleeding. Colonoscopy 3/10 with diverticulosis, 2 polyps, congested mucosa  - Continue pantoprazole BID as per GI   - Avoid anticoagulation  - transfuse <7, pt has been consented  - hgb stable 3/20, continue to monitor    Depression with hx of suicidal ideation  Anxiety  PTA duloxetine 60 mg daily, quetiapine 50 mg at bedtime PRN, trazodone 200 mg at bedtime, mirtazapine 15 mg at bedtime.   Poor medication compliance, he has not taken these meds for few weeks now. Psych had seen previous hospitalization. He denies ongoing SI in the ED.   - Psychiatry has seen, greatly appreciate assistance  - continue duloxetine, mirtazapine, prn trazodone  - prn atarax for anxiety  - undergoing commitment proceedings with the [facility], s/p preliminary hearing 3/18. Next court hearing by phone on 3/23     Alcohol intoxication  Alcohol use disorder  Drinking up to 1-1.75 L vodka daily. BARNEY on admission at 0.29. Does have hx of DT's, but no h/o withdrawal seizures.   - IV vitamins given  - withdrawal completed  - Psychiatry consulted as above  - Chemical dependency has seen. Most recent evaluation 2/13, still valid for 30 days. Possibility of [place] program for treatment  - undergoing court commitment proceedings     JANIS on CPAP  Uses at home, declines here. Will order at discharge     COPD  Tobacco abuse  Stable  - Continue prior to admission inhalers  - declining Breo Ellipta, will change to prn. Pt states he might want at discharge \"when he might start smoking again\"    Thrombocytopenia secondary to cirrhosis  Improving last check 3/13  - platelets normalized 3/20    Diet: 2 Gram Sodium Diet  Snacks/Supplements Adult: Other; 10 am and 2pm: straw Boost  (RD); Between Meals    DVT Prophylaxis: Pneumatic Compression Devices  Leger Catheter: not present  Code Status: DNR/DNI      Disposition Plan   Expected discharge: Will remain under " court commitment.  Court hearing is on 3/23.  Appreciate social work assistance.  Entered: Mark Gomez MD 03/22/2020, 11:39 AM     The patient's care was discussed with the Bedside Nurse and Patient.    ______________________________________________________________________    Interval History   Overnight events reviewed. Fewer BM's today. Denies cp/sob.     Data reviewed today: I reviewed  All medications, new labs and imaging results over the last 24 hours. I personally reviewed no images or EKG's today.    Physical Exam   Vital Signs: Temp: 98  F (36.7  C) Temp src: Oral BP: 137/59 Pulse: 65 Heart Rate: 66 Resp: 16 SpO2: 98 % O2 Device: None (Room air)    Weight: 177 lbs 0 oz    Constitutional: Awake, alert, cooperative, no apparent distress  Respiratory: CTA B  Cardiovascular: Regular rate and rhythm, MICKY noted  GI: Normal bowel sounds,  distended and firm, nontender  Skin/Integumen: No rashes, no edema noted  Neuro : moving all 4 extremities, no focal deficit noted       Data   Recent Labs   Lab 03/21/20  0717 03/20/20  0651 03/19/20  1532   WBC  --  4.6  --    HGB  --  9.4*  --    MCV  --  92  --    PLT  --  204  --    * 131* 131*   POTASSIUM 4.2 4.2 4.2   CHLORIDE 101 101 101   CO2 24 26 26   BUN 25 27 28   CR 1.42* 1.40* 1.39*   ANIONGAP 6 4 4   KEV 8.8 8.7 8.5   GLC 83 85 121*       No results found for this or any previous visit (from the past 24 hour(s)).  Medications     - MEDICATION INSTRUCTIONS -       - MEDICATION INSTRUCTIONS -       - MEDICATION INSTRUCTIONS -       - MEDICATION INSTRUCTIONS -         albumin human  12.5 g Intravenous Once     DULoxetine  60 mg Oral Daily     fluticasone-vilanterol  1 puff Inhalation Daily     furosemide  20 mg Oral Daily     lactulose  10 g Oral BID     mirtazapine  15 mg Oral At Bedtime     nicotine  1 patch Transdermal Daily     nicotine   Transdermal Q8H     pantoprazole  40 mg Oral BID AC     polyethylene glycol  238 g Oral Once     sodium chloride  (PF)  3 mL Intracatheter Q8H     spironolactone  25 mg Oral Daily

## 2020-03-22 NOTE — PLAN OF CARE
2713-8517: AOx4. VSS on RA. Up Independent in room and SBA in hallway d/t court hold. Bedside attendant present in room. Ambulated x1 in muniz. 2 gram Na diet. C/o back pain, Tylenol and Atarax given. Paracentesis site to right side, band-aid, CDI. BS active, abdomen firm & distended. +flatus. Voiding bathroom. IV-SL. Plan for second court hearing on 3/23. Discharge pending. Continue to monitor.

## 2020-03-22 NOTE — PLAN OF CARE
3/22/2020: AOx4. VSS on RA. Up Independent in room and SBA in hallway d/t court hold. Bedside attendant present in room. 2 gram Na diet. C/o back pain, Tylenol given. Paracentesis site to right side of abdomen, band-aid, CDI. BS active, abdomen firm & distended. +flatus. Voiding bathroom. IV-SL. Plan for second court hearing on 3/23. Discharge pending. Continue to monitor.

## 2020-03-23 LAB
ALBUMIN SERPL-MCNC: 2.7 G/DL (ref 3.4–5)
ALP SERPL-CCNC: 253 U/L (ref 40–150)
ALT SERPL W P-5'-P-CCNC: 48 U/L (ref 0–70)
ANION GAP SERPL CALCULATED.3IONS-SCNC: 6 MMOL/L (ref 3–14)
AST SERPL W P-5'-P-CCNC: 38 U/L (ref 0–45)
BASOPHILS # BLD AUTO: 0.1 10E9/L (ref 0–0.2)
BASOPHILS NFR BLD AUTO: 1 %
BILIRUB SERPL-MCNC: 0.8 MG/DL (ref 0.2–1.3)
BUN SERPL-MCNC: 30 MG/DL (ref 7–30)
CALCIUM SERPL-MCNC: 8.6 MG/DL (ref 8.5–10.1)
CHLORIDE SERPL-SCNC: 99 MMOL/L (ref 94–109)
CO2 SERPL-SCNC: 26 MMOL/L (ref 20–32)
CREAT SERPL-MCNC: 1.43 MG/DL (ref 0.66–1.25)
DIFFERENTIAL METHOD BLD: ABNORMAL
EOSINOPHIL # BLD AUTO: 0.2 10E9/L (ref 0–0.7)
EOSINOPHIL NFR BLD AUTO: 2.5 %
ERYTHROCYTE [DISTWIDTH] IN BLOOD BY AUTOMATED COUNT: 15.3 % (ref 10–15)
GFR SERPL CREATININE-BSD FRML MDRD: 51 ML/MIN/{1.73_M2}
GLUCOSE SERPL-MCNC: 90 MG/DL (ref 70–99)
HCT VFR BLD AUTO: 28.8 % (ref 40–53)
HGB BLD-MCNC: 9.5 G/DL (ref 13.3–17.7)
IMM GRANULOCYTES # BLD: 0 10E9/L (ref 0–0.4)
IMM GRANULOCYTES NFR BLD: 0.2 %
LYMPHOCYTES # BLD AUTO: 0.7 10E9/L (ref 0.8–5.3)
LYMPHOCYTES NFR BLD AUTO: 11.8 %
MCH RBC QN AUTO: 30.1 PG (ref 26.5–33)
MCHC RBC AUTO-ENTMCNC: 33 G/DL (ref 31.5–36.5)
MCV RBC AUTO: 91 FL (ref 78–100)
MONOCYTES # BLD AUTO: 0.7 10E9/L (ref 0–1.3)
MONOCYTES NFR BLD AUTO: 11.8 %
NEUTROPHILS # BLD AUTO: 4.4 10E9/L (ref 1.6–8.3)
NEUTROPHILS NFR BLD AUTO: 72.7 %
NRBC # BLD AUTO: 0 10*3/UL
NRBC BLD AUTO-RTO: 0 /100
PLATELET # BLD AUTO: 256 10E9/L (ref 150–450)
POTASSIUM SERPL-SCNC: 4 MMOL/L (ref 3.4–5.3)
PROT SERPL-MCNC: 7 G/DL (ref 6.8–8.8)
RBC # BLD AUTO: 3.16 10E12/L (ref 4.4–5.9)
SODIUM SERPL-SCNC: 131 MMOL/L (ref 133–144)
WBC # BLD AUTO: 6 10E9/L (ref 4–11)

## 2020-03-23 PROCEDURE — 25000132 ZZH RX MED GY IP 250 OP 250 PS 637: Mod: GY | Performed by: HOSPITALIST

## 2020-03-23 PROCEDURE — 99207 ZZC CDG-MDM COMPONENT: MEETS MODERATE - UP CODED: CPT | Performed by: INTERNAL MEDICINE

## 2020-03-23 PROCEDURE — 25000132 ZZH RX MED GY IP 250 OP 250 PS 637: Mod: GY | Performed by: INTERNAL MEDICINE

## 2020-03-23 PROCEDURE — 25000132 ZZH RX MED GY IP 250 OP 250 PS 637: Mod: GY | Performed by: STUDENT IN AN ORGANIZED HEALTH CARE EDUCATION/TRAINING PROGRAM

## 2020-03-23 PROCEDURE — 12000000 ZZH R&B MED SURG/OB

## 2020-03-23 PROCEDURE — 85025 COMPLETE CBC W/AUTO DIFF WBC: CPT | Performed by: INTERNAL MEDICINE

## 2020-03-23 PROCEDURE — 80053 COMPREHEN METABOLIC PANEL: CPT | Performed by: INTERNAL MEDICINE

## 2020-03-23 PROCEDURE — 36415 COLL VENOUS BLD VENIPUNCTURE: CPT | Performed by: INTERNAL MEDICINE

## 2020-03-23 PROCEDURE — 99232 SBSQ HOSP IP/OBS MODERATE 35: CPT | Performed by: INTERNAL MEDICINE

## 2020-03-23 RX ADMIN — DULOXETINE 60 MG: 60 CAPSULE, DELAYED RELEASE ORAL at 08:34

## 2020-03-23 RX ADMIN — FUROSEMIDE 20 MG: 20 TABLET ORAL at 08:34

## 2020-03-23 RX ADMIN — MIRTAZAPINE 15 MG: 15 TABLET, FILM COATED ORAL at 21:52

## 2020-03-23 RX ADMIN — PANTOPRAZOLE SODIUM 40 MG: 40 TABLET, DELAYED RELEASE ORAL at 15:42

## 2020-03-23 RX ADMIN — HYDROXYZINE HYDROCHLORIDE 25 MG: 25 TABLET, FILM COATED ORAL at 06:57

## 2020-03-23 RX ADMIN — HYDROXYZINE HYDROCHLORIDE 25 MG: 25 TABLET, FILM COATED ORAL at 20:13

## 2020-03-23 RX ADMIN — QUETIAPINE 50 MG: 25 TABLET ORAL at 23:42

## 2020-03-23 RX ADMIN — TRAZODONE HYDROCHLORIDE 50 MG: 50 TABLET ORAL at 21:48

## 2020-03-23 RX ADMIN — LACTULOSE 10 G: 10 SOLUTION ORAL at 08:35

## 2020-03-23 RX ADMIN — LACTULOSE 10 G: 10 SOLUTION ORAL at 21:48

## 2020-03-23 RX ADMIN — NICOTINE 1 PATCH: 14 PATCH, EXTENDED RELEASE TRANSDERMAL at 08:36

## 2020-03-23 RX ADMIN — HYDROXYZINE HYDROCHLORIDE 25 MG: 25 TABLET, FILM COATED ORAL at 13:43

## 2020-03-23 RX ADMIN — PANTOPRAZOLE SODIUM 40 MG: 40 TABLET, DELAYED RELEASE ORAL at 06:24

## 2020-03-23 RX ADMIN — SPIRONOLACTONE 25 MG: 25 TABLET ORAL at 08:34

## 2020-03-23 ASSESSMENT — ACTIVITIES OF DAILY LIVING (ADL)
ADLS_ACUITY_SCORE: 15

## 2020-03-23 NOTE — PLAN OF CARE
A&Ox4. VSS on RA. Up Independently in room, SBA in muniz. 2g Na diet. PRN Tylenol given for pain. BS active, abdomen distended/firm. Paracentesis site to right side of abdomen, band-aid, CDI. Voiding bathroom. IV-SL. Sitter at bedside. Plan for second court hearing on 3/23 @1500 in OB room. Charge RN informed. Court will call the unit beforehand. Discharge pending. Continue to monitor.

## 2020-03-23 NOTE — PLAN OF CARE
[id number]: A&Ox4. VSS on RA. Up Independently in room, SBA in muniz. 2g Na diet. C/o 5/10 back pain, PRN Tylenol given with Atarax. BS active, abdomen distended/firm. Paracentesis site to right side of abdomen, band-aid, CDI. Voiding bathroom, per pt reported 3 BM's today. IV-SL. Sitter at bedside. Plan for second court hearing on 3/23 @1500 in OB room. Charge RN informed. Court will call the unit beforehand. Discharge pending. Continue to monitor.

## 2020-03-23 NOTE — PLAN OF CARE
Oriented x4, pleasant.  Sitter at bedside.  IV saline locked.  Tolerated regular diet without nausea.  Ambulating independently within room and standby assistance in halls.  Band-aid on abdomen from paracentesis on 3/20/20 C/D/I.  Pt signed waiver today so no court hearing today required.  Pt agreed to commitment and signed form was faxed back to his court-appointed .  Endorses feeling anxious, atarax given with good relief.  Pt asked for MD to consider restarting antidepressants, psychiatry consult entered.  Nursing will continue to monitor.

## 2020-03-23 NOTE — PLAN OF CARE
AOx4. VSS on RA. No c/o pain. BS: active, abd distended/firm. Paracentesis site to right side of abdomen, band-aid, CDI. Up Independently in room, SBA in muniz. R PIVSL. Sitter at bedside. Plan for second court hearing on 3/23. Discharge pending. Continue to monitor.

## 2020-03-23 NOTE — PROGRESS NOTES
DANIELE Note:    D/I:  SW received waiver from court appointed , Niesha Rodrigez ([phone]), for patient to sign regarding the hearing today and that he is in agreement with the commitment.  SW provided to patient who was in agreement with signing document.  DANIELE faxed back to patient's court appointed  Niesha and confirmed receipt.      JULIA Navarro, LGSW  [phone]  [facility]

## 2020-03-24 PROCEDURE — 25000132 ZZH RX MED GY IP 250 OP 250 PS 637: Mod: GY | Performed by: HOSPITALIST

## 2020-03-24 PROCEDURE — 25000132 ZZH RX MED GY IP 250 OP 250 PS 637: Mod: GY | Performed by: STUDENT IN AN ORGANIZED HEALTH CARE EDUCATION/TRAINING PROGRAM

## 2020-03-24 PROCEDURE — 99232 SBSQ HOSP IP/OBS MODERATE 35: CPT | Performed by: HOSPITALIST

## 2020-03-24 PROCEDURE — 25000132 ZZH RX MED GY IP 250 OP 250 PS 637: Mod: GY | Performed by: INTERNAL MEDICINE

## 2020-03-24 PROCEDURE — 12000000 ZZH R&B MED SURG/OB

## 2020-03-24 RX ADMIN — PANTOPRAZOLE SODIUM 40 MG: 40 TABLET, DELAYED RELEASE ORAL at 06:35

## 2020-03-24 RX ADMIN — PANTOPRAZOLE SODIUM 40 MG: 40 TABLET, DELAYED RELEASE ORAL at 15:38

## 2020-03-24 RX ADMIN — SPIRONOLACTONE 25 MG: 25 TABLET ORAL at 08:08

## 2020-03-24 RX ADMIN — HYDROXYZINE HYDROCHLORIDE 25 MG: 25 TABLET, FILM COATED ORAL at 21:55

## 2020-03-24 RX ADMIN — FUROSEMIDE 20 MG: 20 TABLET ORAL at 08:08

## 2020-03-24 RX ADMIN — TRAZODONE HYDROCHLORIDE 50 MG: 50 TABLET ORAL at 21:55

## 2020-03-24 RX ADMIN — HYDROXYZINE HYDROCHLORIDE 25 MG: 25 TABLET, FILM COATED ORAL at 09:35

## 2020-03-24 RX ADMIN — MIRTAZAPINE 15 MG: 15 TABLET, FILM COATED ORAL at 21:55

## 2020-03-24 RX ADMIN — LACTULOSE 10 G: 10 SOLUTION ORAL at 08:08

## 2020-03-24 RX ADMIN — DULOXETINE 60 MG: 60 CAPSULE, DELAYED RELEASE ORAL at 08:08

## 2020-03-24 RX ADMIN — NICOTINE 1 PATCH: 14 PATCH, EXTENDED RELEASE TRANSDERMAL at 08:08

## 2020-03-24 RX ADMIN — HYDROXYZINE HYDROCHLORIDE 25 MG: 25 TABLET, FILM COATED ORAL at 15:38

## 2020-03-24 RX ADMIN — LACTULOSE 10 G: 10 SOLUTION ORAL at 21:55

## 2020-03-24 ASSESSMENT — ACTIVITIES OF DAILY LIVING (ADL)
ADLS_ACUITY_SCORE: 17
ADLS_ACUITY_SCORE: 17
ADLS_ACUITY_SCORE: 18
ADLS_ACUITY_SCORE: 17

## 2020-03-24 NOTE — PROGRESS NOTES
[facility]    Medicine Progress Note - Hospitalist Service       Date of Admission:  3/7/2020  Assessment & Plan   Jim Richard is a 64 year-old male with history of alcohol dependence, alcoholic cirrhosis with history of varices, depression, anxiety, coronary artery disease, obstructive sleep apnea, peripheral vascular disease, prior subdural hematoma who presents with alcohol intoxication and melena. Admitted 3/7/2020.      Cirrhosis with ascites  Alcoholic hepatitis  Hyponatremia, likely 2/2 cirrhosis/ liver failure  PTA lactulose 10 g TID, propranolol 10 mg BID, furosemide 40 mg daily, spironolactone 50 mg bid  - Continue lactulose, titrate to 2 stools/day  - Ceftriaxone IV ordered for SBP prophylaxis in setting of GIB, paracentesis did not indicate SBP.  Culture negative. 5 day course completed 3/13  - lasix 20 mg daily  - aldactone 50 mg daily 3/12, difficult with increase in dose 2/2 worsening creatinine, currently at 25 mg daily started 3/20  - s/p paracentesis 3/20 (3rd one this hospital stay), 3L removed  - Monitor LFTs and renal function per routine- stable 3/23 (alk phos elevated but this is chronic)  - monitoring hyponatremia, stable 3/23    NGUYEN  Baseline creatinine appears to be ~0.9-1.2. elevated 3/16-17 to 1.4. BP's have been ok. No obvious nephrotoxins administered, no contrast. Possible HRS type II.  - avoid nephrotoxins  - stable 1.43 on 3/23    Melena  Gastrointestinal bleed likely secondary to esophagitis  anemia  H/o Esophageal varices  Acute on chronic blood loss anemia secondary to cirrhosis and GI bleed  Presented with one week hx of melena.  Baseline hemoglobin 9-10 g/dl recent, on admission Hgb 9.4 g/dl.  Mildly tachycardic on admission otherwise blood pressure stable. Suspected slow upper GI bleed.  - GI ([facility]) consulted, EGD 3/7 did not show any bleeding ulcers, noted to have grade 1 esophageal varices with no signs of bleeding. Colonoscopy 3/10 with  "diverticulosis, 2 polyps, congested mucosa  - Continue pantoprazole BID as per GI   - Avoid anticoagulation  - transfuse <7, pt has been consented  - hgb stable 3/23 at 9.5, continue to monitor    Depression with hx of suicidal ideation  Anxiety  PTA duloxetine 60 mg daily, quetiapine 50 mg at bedtime PRN, trazodone 200 mg at bedtime, mirtazapine 15 mg at bedtime.   Poor medication compliance, had not taken meds for several weeks PTA. Psych had seen previous hospitalization. He denies ongoing SI in the ED.   - Psychiatry has seen, greatly appreciate assistance  - continue duloxetine, mirtazapine, prn trazodone  - prn atarax for anxiety  - undergoing commitment proceedings with the county, s/p preliminary hearing 3/18.  Subsequent court hearing by phone on 3/23.  Patient is in agreement with the commitment.  Currently awaiting placement at rehab facility.     Alcohol intoxication  Alcohol use disorder  Drinking up to 1-1.75 L vodka daily. BARNEY on admission at 0.29. Does have hx of DT's, but no h/o withdrawal seizures.   - IV vitamins given  - withdrawal completed  - Psychiatry consulted as above  - Chemical dependency has seen. Most recent evaluation 2/13, still valid for 30 days. Possibility of [place] program for treatment  -Currently awaiting placement at rehab facility.     JANIS on CPAP  Uses at home, declines here. Will order at discharge     COPD  Tobacco abuse  Stable  - Continue prior to admission inhalers  - declining Breo Ellipta, will change to prn. Pt states he might want at discharge \"when he might start smoking again\"    Thrombocytopenia secondary to cirrhosis  Improving last check 3/13  - platelets normalized 3/20    Diet: 2 Gram Sodium Diet  Snacks/Supplements Adult: Other; 10 am and 2pm: straw Boost  (RD); Between Meals    DVT Prophylaxis: Pneumatic Compression Devices  Leger Catheter: not present  Code Status: DNR/DNI      Disposition Plan   Expected discharge: Stable to discharge, currently " awaiting placement at rehab facility.  Appreciate social work assistance.  Entered: Hanny Knowles MD 03/24/2020, 12:26 PM     The patient's care was discussed with the Bedside Nurse and Patient.    ______________________________________________________________________    Interval History   Stable overnight. Denies cp/sob. States abdominal distension is stable.    Data reviewed today: I reviewed  All medications, new labs and imaging results over the last 24 hours. I personally reviewed no images or EKG's today.    Physical Exam   Vital Signs: Temp: 96.8  F (36  C) Temp src: Oral BP: 114/61 Pulse: 78 Heart Rate: 62 Resp: 20 SpO2: 98 % O2 Device: None (Room air)    Weight: 177 lbs 0 oz    Constitutional: Awake, alert, cooperative, no apparent distress  Respiratory: Nonlabored breathing  Cardiovascular: Regular rate and rhythm  GI: Abdomen appears distended, nontender  Skin/Integumen: No rashes  Neuro : moving all 4 extremities, no focal deficit noted       Data   Recent Labs   Lab 03/23/20  0628 03/21/20  0717 03/20/20  0651   WBC 6.0  --  4.6   HGB 9.5*  --  9.4*   MCV 91  --  92     --  204   * 131* 131*   POTASSIUM 4.0 4.2 4.2   CHLORIDE 99 101 101   CO2 26 24 26   BUN 30 25 27   CR 1.43* 1.42* 1.40*   ANIONGAP 6 6 4   KEV 8.6 8.8 8.7   GLC 90 83 85   ALBUMIN 2.7*  --   --    PROTTOTAL 7.0  --   --    BILITOTAL 0.8  --   --    ALKPHOS 253*  --   --    ALT 48  --   --    AST 38  --   --        No results found for this or any previous visit (from the past 24 hour(s)).  Medications     - MEDICATION INSTRUCTIONS -       - MEDICATION INSTRUCTIONS -       - MEDICATION INSTRUCTIONS -       - MEDICATION INSTRUCTIONS -         albumin human  12.5 g Intravenous Once     DULoxetine  60 mg Oral Daily     furosemide  20 mg Oral Daily     lactulose  10 g Oral BID     mirtazapine  15 mg Oral At Bedtime     nicotine  1 patch Transdermal Daily     nicotine   Transdermal Q8H     pantoprazole  40 mg Oral BID AC      polyethylene glycol  238 g Oral Once     sodium chloride (PF)  3 mL Intracatheter Q8H     spironolactone  25 mg Oral Daily

## 2020-03-24 NOTE — PROGRESS NOTES
"SW Note:    D/I:  SW received call from Jeni Nichols ([phone]) who called to state that commitment paperwork was completed and they were going to be working for placement for patient.  They are going to \"call [facility] as patient is eligible for a remote provisional discharge\".  Requesting that CD assessment be faxed over to [phone].  DANIELE spoke with patient to discuss and he is in agreement with previous CD assessment being faxed to [facility].  Jeni also faxed over commitment paperwork for patient.     P: SW will continue to assist and follow for discharge.    JULIA Navarro, LGSW  [phone]  [facility]       "

## 2020-03-24 NOTE — PLAN OF CARE
Pt A/Ox4, VSS on RA.  Denies pain.  Up independent in room, sitter at bedside.  Atarax given x1 for anxiety, CIWA score 0.  Nicotine patch on R upper arm.  Psych consult pending for increased anxiety/depression, pt will be admitted to rehab facility.

## 2020-03-24 NOTE — PLAN OF CARE
A&Ox4. VSS on RA. Denies pain/nausea. Abdomen firm & distended, BS hypoactive. 2g NA diet, tolerating. Up independently in room, SBA in halls; Ambulated halls x4 this shift. Continent of B&B, 2 stools this shift (1 loose & 1 formed). PIV SL. Discharge pending placement to rehab facility.

## 2020-03-24 NOTE — PROGRESS NOTES
CLINICAL NUTRITION SERVICES - REASSESSMENT NOTE    Recommendations Ordered by Registered Dietitian (RD):   Order wt check    Malnutrition: (3/9)  % Weight Loss: unable to assess (last recorded wt was 2/25)  % Intake:  No decreased intake noted (pt denies any decreased po intake)  Subcutaneous Fat Loss:  None observed  Muscle Loss:  Temporal region - mild depletion  Fluid Retention: trace     Malnutrition Diagnosis: Patient does not meet two of the above criteria necessary for diagnosing malnutrition, however, suspect some risk of malnutrition 2' to ETOH and recent diarrhea/GIB      EVALUATION OF PROGRESS TOWARD GOALS   Diet:  2gm Na  Boost supplement at 10am and 2pm    Intake/Tolerance:    Chart reviewed  Spoke with pt via phone and confirmed he still is enjoying the Boost 2x daily. Will continue to send.   Pt has been ordering full meals TID  Flowsheets reflect meal intake has been 100%, with one meal at 75%    NEW FINDINGS:   No new wt since 3/19  Na 131 (L)  Cr 1.43 (H)  Alk Phos 253 (H)    Undergoing commitment proceedings with the [facility], s/p preliminary hearing 3/18 and 3/23. Patient was in agreement with signing document for commitment and documents were faxed.  Awaiting commitment discharge.     Previous Goals:   Pt to consume 75%  Evaluation: Met    Previous Nutrition Diagnosis:   No nutrition diagnosis identified at this time   Evaluation: No change    CURRENT NUTRITION DIAGNOSIS  No nutrition diagnosis identified at this time    INTERVENTIONS  Recommendations / Nutrition Prescription  Continue 2 gm Na diet    Order wt check     Implementation  None at this time    Goals  Pt to consume % of meals TID.     MONITORING AND EVALUATION:  Progress towards goals will be monitored and evaluated per protocol and Practice Guidelines    Adriana Burgess RDN, LD

## 2020-03-24 NOTE — PLAN OF CARE
A&Ox4. VSS on RA. Denied pain. PRN Seroquel given x1 per patient request. 2g sodium diet. Up independently in the room. Continent, voiding in bathroom. PIV saline locked.

## 2020-03-25 PROCEDURE — 99207 ZZC CDG-MDM COMPONENT: MEETS MODERATE - UP CODED: CPT | Performed by: HOSPITALIST

## 2020-03-25 PROCEDURE — 25000132 ZZH RX MED GY IP 250 OP 250 PS 637: Mod: GY | Performed by: INTERNAL MEDICINE

## 2020-03-25 PROCEDURE — 25000132 ZZH RX MED GY IP 250 OP 250 PS 637: Mod: GY | Performed by: STUDENT IN AN ORGANIZED HEALTH CARE EDUCATION/TRAINING PROGRAM

## 2020-03-25 PROCEDURE — 25000132 ZZH RX MED GY IP 250 OP 250 PS 637: Mod: GY | Performed by: HOSPITALIST

## 2020-03-25 PROCEDURE — 99232 SBSQ HOSP IP/OBS MODERATE 35: CPT | Performed by: HOSPITALIST

## 2020-03-25 PROCEDURE — 25000132 ZZH RX MED GY IP 250 OP 250 PS 637: Mod: GY | Performed by: PSYCHIATRY & NEUROLOGY

## 2020-03-25 PROCEDURE — 99232 SBSQ HOSP IP/OBS MODERATE 35: CPT | Performed by: PSYCHIATRY & NEUROLOGY

## 2020-03-25 PROCEDURE — 12000000 ZZH R&B MED SURG/OB

## 2020-03-25 RX ORDER — TRAZODONE HYDROCHLORIDE 50 MG/1
100 TABLET, FILM COATED ORAL
Status: DISCONTINUED | OUTPATIENT
Start: 2020-03-25 | End: 2020-04-09 | Stop reason: HOSPADM

## 2020-03-25 RX ORDER — TRAZODONE HYDROCHLORIDE 100 MG/1
100 TABLET ORAL AT BEDTIME
Status: DISCONTINUED | OUTPATIENT
Start: 2020-03-25 | End: 2020-03-25

## 2020-03-25 RX ADMIN — NICOTINE 1 PATCH: 14 PATCH, EXTENDED RELEASE TRANSDERMAL at 08:30

## 2020-03-25 RX ADMIN — FUROSEMIDE 20 MG: 20 TABLET ORAL at 08:31

## 2020-03-25 RX ADMIN — LACTULOSE 10 G: 10 SOLUTION ORAL at 21:34

## 2020-03-25 RX ADMIN — TRAZODONE HYDROCHLORIDE 100 MG: 50 TABLET ORAL at 21:34

## 2020-03-25 RX ADMIN — ACETAMINOPHEN 650 MG: 325 TABLET, FILM COATED ORAL at 00:35

## 2020-03-25 RX ADMIN — LACTULOSE 10 G: 10 SOLUTION ORAL at 08:30

## 2020-03-25 RX ADMIN — HYDROXYZINE HYDROCHLORIDE 25 MG: 25 TABLET, FILM COATED ORAL at 21:34

## 2020-03-25 RX ADMIN — HYDROXYZINE HYDROCHLORIDE 25 MG: 25 TABLET, FILM COATED ORAL at 09:02

## 2020-03-25 RX ADMIN — SPIRONOLACTONE 25 MG: 25 TABLET ORAL at 08:31

## 2020-03-25 RX ADMIN — DULOXETINE 60 MG: 60 CAPSULE, DELAYED RELEASE ORAL at 08:31

## 2020-03-25 RX ADMIN — PANTOPRAZOLE SODIUM 40 MG: 40 TABLET, DELAYED RELEASE ORAL at 15:49

## 2020-03-25 RX ADMIN — PANTOPRAZOLE SODIUM 40 MG: 40 TABLET, DELAYED RELEASE ORAL at 06:49

## 2020-03-25 RX ADMIN — QUETIAPINE 50 MG: 25 TABLET ORAL at 21:34

## 2020-03-25 RX ADMIN — HYDROXYZINE HYDROCHLORIDE 25 MG: 25 TABLET, FILM COATED ORAL at 15:25

## 2020-03-25 RX ADMIN — MIRTAZAPINE 15 MG: 15 TABLET, FILM COATED ORAL at 21:34

## 2020-03-25 ASSESSMENT — ACTIVITIES OF DAILY LIVING (ADL)
ADLS_ACUITY_SCORE: 17
ADLS_ACUITY_SCORE: 18

## 2020-03-25 NOTE — CONSULTS
"[facility] Psychiatric Consult Progress Note    Interval History:   Pt seen, chart reviewed, case discussed with nursing staff and treating clinicians.  I met with Jim on station 88 this morning.  He is under commitment, awaiting disposition to a hospital-based program such as [facility].  He says he is somewhat depressed, not sleeping real well but has had luck taking Seroquel in the past.  This is ordered as a PRN but he has not been getting it.  He is somewhat dysphoric but not suicidal.  He is appropriately treated with Cymbalta and Remeron.  We discussed scheduling the Seroquel to mitigate his sleep issues and he is in agreement with that.  We talked about the importance of getting into a safe situation where he can maintain sobriety as he was repeatedly failing on an outpatient basis to stay sober and simply has not been able to live independently.  He is concerned that he does not have any close and needs to have something brought in stating \"I am working on it\".     Review of systems:   10 point Review of Systems completed by Dr. Davis, and is  is negative other than noted in the HPI     Medications:       albumin human  12.5 g Intravenous Once     DULoxetine  60 mg Oral Daily     furosemide  20 mg Oral Daily     lactulose  10 g Oral BID     mirtazapine  15 mg Oral At Bedtime     nicotine  1 patch Transdermal Daily     nicotine   Transdermal Q8H     pantoprazole  40 mg Oral BID AC     polyethylene glycol  238 g Oral Once     sodium chloride (PF)  3 mL Intracatheter Q8H     spironolactone  25 mg Oral Daily     acetaminophen, glucose **OR** dextrose **OR** glucagon, fluticasone-vilanterol, hydrOXYzine, lidocaine 4%, lidocaine (buffered or not buffered), LORazepam **OR** LORazepam, - MEDICATION INSTRUCTIONS -, - MEDICATION INSTRUCTIONS -, - MEDICATION INSTRUCTIONS -, - MEDICATION INSTRUCTIONS -, melatonin, miconazole, naloxone, ondansetron **OR** ondansetron, prochlorperazine **OR** " "prochlorperazine **OR** prochlorperazine, QUEtiapine, sodium chloride (PF), sodium chloride (PF), sodium chloride (PF), sodium chloride (PF), traZODone    Mental Status Examination:     Appearance:  awake, alert, dressed in hospital scrubs, appeared older than stated age, poorly groomed and cooperative  Eye Contact:  good  Speech:  clear, coherent  Language:Normal  Psychomotor Behavior:  no evidence of tardive dyskinesia, dystonia, or tics  Mood:  depressed, \"disappointed in myself\".  Affect:  intensity is flat  Thought Process:  logical, linear and goal oriented no loose associations  Thought Content:  no evidence of suicidal ideation or homicidal ideation and no evidence of psychotic thought, some pessimistic depressive cognitions  Oriented to:  time, person, and place  Attention Span and Concentration:  fair  Recent and Remote Memory:  intact  Fund of Knowledge: appropriate  Muscle Strength and Tone: weak  Gait and Station: Normal  Insight:  limited  Judgment:  poor        Labs/Vitals:     No results found for this or any previous visit (from the past 24 hour(s)).  B/P: 133/62, T: 98.4, P: 65, R: 15    Impression:   Jim presents with intractable alcohol dependence and multiple inpatient medical admissions related to his drinking.  He is under commitment, awaiting disposition to an appropriate program.  We can add some Seroquel to address his sleep disturbance which I think would be appropriate, noting that he does have a history of some chronic depression and is currently on Cymbalta and Remeron at appropriate doses.  1.  Alcohol use disorder, severe  2.  Major depressive disorder recurrent, moderate severity  3.  Unspecified anxiety disorder  4.  Acute alcohol intoxication and multiple medical comorbidities related to severe alcohol use         Plan:   1. Written information given on medications. Side effects, risks, benefits reviewed.  2.  Continue current psychotropic meds, schedule Seroquel 50 mg nightly.  " There is an option to increase Cymbalta to 90 mg daily but I would start by scheduling the Seroquel because his main complaint is sleep disturbance  3.  Awaiting disposition to inpatient chemical dependency treatment, currently committed     attestation:  Patient has been seen and evaluated by me,  Donell Davis MD

## 2020-03-25 NOTE — PLAN OF CARE
A&Ox4. VSS on RA. Atarax giving x1 for anxiety. C/o stiffness & muscle cramping to inner thighs, stretching & walks effective for pain control. Abdomen firm & distended, nontender & BS active. Bedtime trazadone started. Nicotine patch to R shoulder. Ambulated halls x4 today. Discharge pending placement.

## 2020-03-25 NOTE — PLAN OF CARE
Pt A/Ox4, VSS on RA except BP elevated.  Anxious, PRN atarax covering.  CIWA 2 for slight tremors bilat arms. Up independent in room, ambulating in muniz with assistance frequently.  L leg +1 edema, scab WDL and AGUILA.  Previous paracentesis site CDI. Abdomen distended, firm, pt denies discomfort. 2 loose stools today, lactulose continued.  Discharge to rehab facility and psych consult pending, continue to monitor.

## 2020-03-25 NOTE — PLAN OF CARE
"No changes overnight. VSS. CIWA 2. New pain in bilateral groin- pt reports it is \"muscular\", tylenol given x1, slept rest of night. Abd distended but nontender and soft. Pt reported loose stools x2. Psych to consult today. Discharge pending placement.  "

## 2020-03-25 NOTE — PROGRESS NOTES
[facility]    Medicine Progress Note - Hospitalist Service       Date of Admission:  3/7/2020  Assessment & Plan   Jim Richard is a 64 year-old male with history of alcohol dependence, alcoholic cirrhosis with history of varices, depression, anxiety, coronary artery disease, obstructive sleep apnea, peripheral vascular disease, prior subdural hematoma who presents with alcohol intoxication and melena. Admitted 3/7/2020.      Cirrhosis with ascites  Alcoholic hepatitis  Hyponatremia, likely 2/2 cirrhosis/ liver failure  PTA lactulose 10 g TID, propranolol 10 mg BID, furosemide 40 mg daily, spironolactone 50 mg bid  - Continue lactulose, titrate to 2 stools/day  - Ceftriaxone IV ordered for SBP prophylaxis in setting of GIB, paracentesis did not indicate SBP.  Culture negative. 5 day course completed 3/13  - lasix 20 mg daily  - aldactone 50 mg daily 3/12, difficult with increase in dose 2/2 worsening creatinine, currently at 25 mg daily started 3/20  - s/p paracentesis 3/20 (3rd one this hospital stay), 3L removed  - Monitor LFTs and renal function per routine- stable 3/23 (alk phos elevated but this is chronic)  - monitoring hyponatremia, stable 3/23    NGUYEN  Baseline creatinine appears to be ~0.9-1.2. elevated 3/16-17 to 1.4. BP's have been ok. No obvious nephrotoxins administered, no contrast. Possible HRS type II.  - avoid nephrotoxins  - stable 1.43 on 3/23    Melena  Gastrointestinal bleed likely secondary to esophagitis  anemia  H/o Esophageal varices  Acute on chronic blood loss anemia secondary to cirrhosis and GI bleed  Presented with one week hx of melena.  Baseline hemoglobin 9-10 g/dl recent, on admission Hgb 9.4 g/dl.  Mildly tachycardic on admission otherwise blood pressure stable. Suspected slow upper GI bleed.  - GI ([facility]) consulted, EGD 3/7 did not show any bleeding ulcers, noted to have grade 1 esophageal varices with no signs of bleeding. Colonoscopy 3/10 with  "diverticulosis, 2 polyps, congested mucosa  - Continue pantoprazole BID as per GI   - Avoid anticoagulation  - transfuse <7, pt has been consented  - hgb stable 3/23 at 9.5, continue to monitor    Depression with hx of suicidal ideation  Anxiety  PTA duloxetine 60 mg daily, quetiapine 50 mg at bedtime PRN, trazodone 200 mg at bedtime, mirtazapine 15 mg at bedtime.   Poor medication compliance, had not taken meds for several weeks PTA. Psych had seen previous hospitalization. He denies ongoing SI in the ED.   - Psychiatry has seen, greatly appreciate assistance  - continue duloxetine, mirtazapine, prn trazodone [100 mg as needed at bedtime].  Also now on quetiapine 50 mg scheduled at bedtime to help with sleep per psychiatry.   - prn atarax for anxiety  - undergoing commitment proceedings with the [facility], s/p preliminary hearing 3/18.  Subsequent court hearing by phone on 3/23.  Patient is in agreement with the commitment.  Currently awaiting placement at rehab facility.     Alcohol intoxication  Alcohol use disorder  Drinking up to 1-1.75 L vodka daily. BAC on admission at 0.29. Does have hx of DT's, but no h/o withdrawal seizures.   - IV vitamins given  - withdrawal completed  - Psychiatry consulted as above  - Chemical dependency has seen. Most recent evaluation 2/13, still valid for 30 days. Possibility of [facility] program for treatment  -Currently awaiting placement at rehab facility.     JANIS on CPAP  Uses at home, declines here. Will order at discharge     COPD  Tobacco abuse  Stable  - Continue prior to admission inhalers  - declining Breo Ellipta, will change to prn. Pt states he might want at discharge \"when he might start smoking again\"    Thrombocytopenia secondary to cirrhosis  Improving last check 3/13  - platelets normalized 3/20    Diet: 2 Gram Sodium Diet  Snacks/Supplements Adult: Other; 10 am and 2pm: straw Boost  (RD); Between Meals    DVT Prophylaxis: Pneumatic Compression Devices  Leger " Catheter: not present  Code Status: DNR/DNI      Disposition Plan   Expected discharge: Stable to discharge, currently awaiting placement at rehab facility.  Appreciate social work assistance.  Entered: Hanny Knowles MD 03/25/2020, 11:55 AM     The patient's care was discussed with the Bedside Nurse and Patient.    ______________________________________________________________________    Interval History   Stable overnight. Denies cp/sob. States abdominal distension is stable.  Patient was seen walking the hallways with nurse.  Tells me that this morning he had muscle cramps in both his thighs.  This is improving now.  Appetite is good and he is eating well.    Data reviewed today: I reviewed  All medications, new labs and imaging results over the last 24 hours. I personally reviewed no images or EKG's today.    Physical Exam   Vital Signs: Temp: 97.1  F (36.2  C) Temp src: Oral BP: 129/62 Pulse: 60 Heart Rate: 63 Resp: 16 SpO2: 96 % O2 Device: None (Room air)    Weight: 177 lbs 0 oz    Constitutional: Awake, alert, cooperative, no apparent distress  Respiratory: Nonlabored breathing  Cardiovascular: Minimal leg edema  GI: Abdomen appears distended, nontender  Skin/Integumen: No rashes  Neuro : moving all 4 extremities, no focal deficit noted       Data   Recent Labs   Lab 03/23/20  0628 03/21/20  0717 03/20/20  0651   WBC 6.0  --  4.6   HGB 9.5*  --  9.4*   MCV 91  --  92     --  204   * 131* 131*   POTASSIUM 4.0 4.2 4.2   CHLORIDE 99 101 101   CO2 26 24 26   BUN 30 25 27   CR 1.43* 1.42* 1.40*   ANIONGAP 6 6 4   KEV 8.6 8.8 8.7   GLC 90 83 85   ALBUMIN 2.7*  --   --    PROTTOTAL 7.0  --   --    BILITOTAL 0.8  --   --    ALKPHOS 253*  --   --    ALT 48  --   --    AST 38  --   --        No results found for this or any previous visit (from the past 24 hour(s)).  Medications     - MEDICATION INSTRUCTIONS -       - MEDICATION INSTRUCTIONS -         DULoxetine  60 mg Oral Daily     furosemide  20 mg  Oral Daily     lactulose  10 g Oral BID     mirtazapine  15 mg Oral At Bedtime     nicotine  1 patch Transdermal Daily     nicotine   Transdermal Q8H     pantoprazole  40 mg Oral BID AC     QUEtiapine  50 mg Oral At Bedtime     sodium chloride (PF)  3 mL Intracatheter Q8H     spironolactone  25 mg Oral Daily

## 2020-03-26 LAB
ANION GAP SERPL CALCULATED.3IONS-SCNC: 6 MMOL/L (ref 3–14)
BUN SERPL-MCNC: 32 MG/DL (ref 7–30)
CALCIUM SERPL-MCNC: 8.7 MG/DL (ref 8.5–10.1)
CHLORIDE SERPL-SCNC: 102 MMOL/L (ref 94–109)
CO2 SERPL-SCNC: 23 MMOL/L (ref 20–32)
CREAT SERPL-MCNC: 1.37 MG/DL (ref 0.66–1.25)
GFR SERPL CREATININE-BSD FRML MDRD: 53 ML/MIN/{1.73_M2}
GLUCOSE SERPL-MCNC: 84 MG/DL (ref 70–99)
POTASSIUM SERPL-SCNC: 4.3 MMOL/L (ref 3.4–5.3)
SODIUM SERPL-SCNC: 131 MMOL/L (ref 133–144)

## 2020-03-26 PROCEDURE — 25000132 ZZH RX MED GY IP 250 OP 250 PS 637: Mod: GY | Performed by: INTERNAL MEDICINE

## 2020-03-26 PROCEDURE — 25000132 ZZH RX MED GY IP 250 OP 250 PS 637: Mod: GY | Performed by: STUDENT IN AN ORGANIZED HEALTH CARE EDUCATION/TRAINING PROGRAM

## 2020-03-26 PROCEDURE — 25000132 ZZH RX MED GY IP 250 OP 250 PS 637: Mod: GY | Performed by: PSYCHIATRY & NEUROLOGY

## 2020-03-26 PROCEDURE — 99231 SBSQ HOSP IP/OBS SF/LOW 25: CPT | Performed by: HOSPITALIST

## 2020-03-26 PROCEDURE — 12000000 ZZH R&B MED SURG/OB

## 2020-03-26 PROCEDURE — 25000132 ZZH RX MED GY IP 250 OP 250 PS 637: Mod: GY | Performed by: HOSPITALIST

## 2020-03-26 PROCEDURE — 36415 COLL VENOUS BLD VENIPUNCTURE: CPT | Performed by: HOSPITALIST

## 2020-03-26 PROCEDURE — 80048 BASIC METABOLIC PNL TOTAL CA: CPT | Performed by: HOSPITALIST

## 2020-03-26 RX ADMIN — NICOTINE 1 PATCH: 14 PATCH, EXTENDED RELEASE TRANSDERMAL at 08:08

## 2020-03-26 RX ADMIN — MIRTAZAPINE 15 MG: 15 TABLET, FILM COATED ORAL at 21:17

## 2020-03-26 RX ADMIN — LACTULOSE 10 G: 10 SOLUTION ORAL at 21:16

## 2020-03-26 RX ADMIN — PANTOPRAZOLE SODIUM 40 MG: 40 TABLET, DELAYED RELEASE ORAL at 06:57

## 2020-03-26 RX ADMIN — PANTOPRAZOLE SODIUM 40 MG: 40 TABLET, DELAYED RELEASE ORAL at 16:01

## 2020-03-26 RX ADMIN — FUROSEMIDE 20 MG: 20 TABLET ORAL at 08:08

## 2020-03-26 RX ADMIN — LACTULOSE 10 G: 10 SOLUTION ORAL at 08:08

## 2020-03-26 RX ADMIN — QUETIAPINE 50 MG: 25 TABLET ORAL at 21:16

## 2020-03-26 RX ADMIN — TRAZODONE HYDROCHLORIDE 100 MG: 50 TABLET ORAL at 21:17

## 2020-03-26 RX ADMIN — SPIRONOLACTONE 25 MG: 25 TABLET ORAL at 08:08

## 2020-03-26 RX ADMIN — DULOXETINE 60 MG: 60 CAPSULE, DELAYED RELEASE ORAL at 08:08

## 2020-03-26 RX ADMIN — HYDROXYZINE HYDROCHLORIDE 25 MG: 25 TABLET, FILM COATED ORAL at 12:01

## 2020-03-26 RX ADMIN — HYDROXYZINE HYDROCHLORIDE 25 MG: 25 TABLET, FILM COATED ORAL at 18:00

## 2020-03-26 ASSESSMENT — ACTIVITIES OF DAILY LIVING (ADL)
ADLS_ACUITY_SCORE: 17

## 2020-03-26 NOTE — PLAN OF CARE
Pt is AxOx4, Denied pain. VSS. Nicotine patch L shoulder. No loose stools. Up independently- SBA in muniz. Discharge pending placement, SW assisting.

## 2020-03-26 NOTE — PROGRESS NOTES
[facility]    Medicine Progress Note - Hospitalist Service       Date of Admission:  3/7/2020  Assessment & Plan   Jim Richard is a 64 year-old male with history of alcohol dependence, alcoholic cirrhosis with history of varices, depression, anxiety, coronary artery disease, obstructive sleep apnea, peripheral vascular disease, prior subdural hematoma who presents with alcohol intoxication and melena. Admitted 3/7/2020.      Cirrhosis with ascites  Alcoholic hepatitis  Hyponatremia, likely 2/2 cirrhosis/ liver failure  PTA lactulose 10 g TID, propranolol 10 mg BID, furosemide 40 mg daily, spironolactone 50 mg bid  - Continue lactulose, titrate to 2 stools/day  - Ceftriaxone IV ordered for SBP prophylaxis in setting of GIB, paracentesis did not indicate SBP.  Culture negative. 5 day course completed 3/13  - lasix 20 mg daily  - aldactone 50 mg daily 3/12, difficult with increase in dose 2/2 worsening creatinine, currently at 25 mg daily started 3/20  - s/p paracentesis 3/20 (3rd one this hospital stay), 3L removed  - Monitor LFTs and renal function per routine- stable 3/23 (alk phos elevated but this is chronic)  - monitoring hyponatremia, stable 3/26    NGUYEN  Baseline creatinine appears to be ~0.9-1.2. elevated 3/16-17 to 1.4. BP's have been ok. No obvious nephrotoxins administered, no contrast. Possible HRS type II.  - avoid nephrotoxins  - stable 1.3 on 3/26    Melena  Gastrointestinal bleed likely secondary to esophagitis  anemia  H/o Esophageal varices  Acute on chronic blood loss anemia secondary to cirrhosis and GI bleed  Presented with one week hx of melena.  Baseline hemoglobin 9-10 g/dl recent, on admission Hgb 9.4 g/dl.  Mildly tachycardic on admission otherwise blood pressure stable. Suspected slow upper GI bleed.  - GI ([facility]) consulted, EGD 3/7 did not show any bleeding ulcers, noted to have grade 1 esophageal varices with no signs of bleeding. Colonoscopy 3/10 with  "diverticulosis, 2 polyps, congested mucosa  - Continue pantoprazole BID as per GI   - Avoid anticoagulation  - transfuse <7, pt has been consented  - hgb stable 3/23 at 9.5, continue to monitor    Depression with hx of suicidal ideation  Anxiety  PTA duloxetine 60 mg daily, quetiapine 50 mg at bedtime PRN, trazodone 200 mg at bedtime, mirtazapine 15 mg at bedtime.   Poor medication compliance, had not taken meds for several weeks PTA. Psych had seen previous hospitalization. He denies ongoing SI in the ED.   - Psychiatry has seen, greatly appreciate assistance  - continue duloxetine, mirtazapine, prn trazodone [100 mg as needed at bedtime].  Also now on quetiapine 50 mg scheduled at bedtime to help with sleep per psychiatry.   - prn atarax for anxiety  - undergoing commitment proceedings with the [facility], s/p preliminary hearing 3/18.  Subsequent court hearing by phone on 3/23.  Patient is in agreement with the commitment.  Currently awaiting placement at rehab facility.     Alcohol intoxication  Alcohol use disorder  Drinking up to 1-1.75 L vodka daily. BAC on admission at 0.29. Does have hx of DT's, but no h/o withdrawal seizures.   - IV vitamins given  - withdrawal completed  - Psychiatry consulted as above  - Chemical dependency has seen. Most recent evaluation 2/13, still valid for 30 days. Possibility of [facility] program for treatment  -Currently awaiting placement at rehab facility.     JANIS on CPAP  Uses at home, declines here. Will order at discharge     COPD  Tobacco abuse  Stable  - Continue prior to admission inhalers  - declining Breo Ellipta, will change to prn. Pt states he might want at discharge \"when he might start smoking again\"    Thrombocytopenia secondary to cirrhosis  Improving last check 3/13  - platelets normalized 3/20    Diet: 2 Gram Sodium Diet  Snacks/Supplements Adult: Other; 10 am and 2pm: straw Boost  (RD); Between Meals    DVT Prophylaxis: Pneumatic Compression Devices  Leger " Catheter: not present  Code Status: DNR/DNI      Disposition Plan   Expected discharge: Stable to discharge, currently awaiting placement at rehab facility.  Appreciate social work assistance.  Entered: Hanny Knowles MD 03/26/2020, 2:04 PM     The patient's care was discussed with the Bedside Nurse and Patient.    ______________________________________________________________________    Interval History   Stable overnight. Denies cp/sob. States abdominal distension is stable.  Is mainly frustrated with waiting in the hospital for placement but understands the challenging situation.    Data reviewed today: I reviewed  All medications, new labs and imaging results over the last 24 hours. I personally reviewed no images or EKG's today.    Physical Exam   Vital Signs: Temp: 98  F (36.7  C) Temp src: Oral BP: 121/62 Pulse: 61 Heart Rate: 60 Resp: 16 SpO2: 95 % O2 Device: None (Room air)    Weight: 177 lbs 0 oz    Constitutional: Awake, alert, cooperative, no apparent distress  Respiratory: Nonlabored breathing  Cardiovascular: Minimal leg edema  GI: Abdomen appears distended, nontender  Skin/Integumen: No rashes  Neuro : moving all 4 extremities, no focal deficit noted       Data   Recent Labs   Lab 03/26/20  0644 03/23/20  0628 03/21/20  0717 03/20/20  0651   WBC  --  6.0  --  4.6   HGB  --  9.5*  --  9.4*   MCV  --  91  --  92   PLT  --  256  --  204   * 131* 131* 131*   POTASSIUM 4.3 4.0 4.2 4.2   CHLORIDE 102 99 101 101   CO2 23 26 24 26   BUN 32* 30 25 27   CR 1.37* 1.43* 1.42* 1.40*   ANIONGAP 6 6 6 4   KEV 8.7 8.6 8.8 8.7   GLC 84 90 83 85   ALBUMIN  --  2.7*  --   --    PROTTOTAL  --  7.0  --   --    BILITOTAL  --  0.8  --   --    ALKPHOS  --  253*  --   --    ALT  --  48  --   --    AST  --  38  --   --        No results found for this or any previous visit (from the past 24 hour(s)).  Medications     - MEDICATION INSTRUCTIONS -       - MEDICATION INSTRUCTIONS -         DULoxetine  60 mg Oral Daily      furosemide  20 mg Oral Daily     lactulose  10 g Oral BID     mirtazapine  15 mg Oral At Bedtime     nicotine  1 patch Transdermal Daily     nicotine   Transdermal Q8H     pantoprazole  40 mg Oral BID AC     QUEtiapine  50 mg Oral At Bedtime     sodium chloride (PF)  3 mL Intracatheter Q8H     spironolactone  25 mg Oral Daily

## 2020-03-26 NOTE — PLAN OF CARE
A&Ox4.  VSS, RA.  Independent in room, SBA in hallways.  Patient complains of anxiety, atarax given with good results.  Denies pain this shift.  Abdomen distended, nontender.  Nicotine patch to R shoulder.  PIV SL.  Discharge pending placement.

## 2020-03-26 NOTE — PROGRESS NOTES
DANIELE Note:    D/I:  DANIELE received call from Michaela Mathias ([phone]) who is requesting referral information be faxed over for review for their facilities for patient's placement.  DANIELE faxed referral to [phone].  DANIELE met with patient to update.    JULIA Navarro, LGSW  [phone]  [facility]

## 2020-03-26 NOTE — PLAN OF CARE
Pt slept most of night. Denied pain. VSS. Nicotine patch R shoulder. No loose stools overnight. Up independently- SBA in muniz. Discharge pending placement.

## 2020-03-27 PROCEDURE — 25000132 ZZH RX MED GY IP 250 OP 250 PS 637: Mod: GY | Performed by: INTERNAL MEDICINE

## 2020-03-27 PROCEDURE — 99231 SBSQ HOSP IP/OBS SF/LOW 25: CPT | Performed by: HOSPITALIST

## 2020-03-27 PROCEDURE — 12000000 ZZH R&B MED SURG/OB

## 2020-03-27 PROCEDURE — 25000132 ZZH RX MED GY IP 250 OP 250 PS 637: Mod: GY | Performed by: HOSPITALIST

## 2020-03-27 PROCEDURE — 25000132 ZZH RX MED GY IP 250 OP 250 PS 637: Mod: GY | Performed by: STUDENT IN AN ORGANIZED HEALTH CARE EDUCATION/TRAINING PROGRAM

## 2020-03-27 PROCEDURE — 25000132 ZZH RX MED GY IP 250 OP 250 PS 637: Mod: GY | Performed by: PSYCHIATRY & NEUROLOGY

## 2020-03-27 RX ADMIN — DULOXETINE 60 MG: 60 CAPSULE, DELAYED RELEASE ORAL at 07:49

## 2020-03-27 RX ADMIN — PANTOPRAZOLE SODIUM 40 MG: 40 TABLET, DELAYED RELEASE ORAL at 16:19

## 2020-03-27 RX ADMIN — LACTULOSE 10 G: 10 SOLUTION ORAL at 07:49

## 2020-03-27 RX ADMIN — LACTULOSE 10 G: 10 SOLUTION ORAL at 21:23

## 2020-03-27 RX ADMIN — TRAZODONE HYDROCHLORIDE 100 MG: 50 TABLET ORAL at 21:28

## 2020-03-27 RX ADMIN — HYDROXYZINE HYDROCHLORIDE 50 MG: 25 TABLET, FILM COATED ORAL at 16:19

## 2020-03-27 RX ADMIN — NICOTINE 1 PATCH: 14 PATCH, EXTENDED RELEASE TRANSDERMAL at 07:49

## 2020-03-27 RX ADMIN — HYDROXYZINE HYDROCHLORIDE 25 MG: 25 TABLET, FILM COATED ORAL at 10:05

## 2020-03-27 RX ADMIN — PANTOPRAZOLE SODIUM 40 MG: 40 TABLET, DELAYED RELEASE ORAL at 06:59

## 2020-03-27 RX ADMIN — MIRTAZAPINE 15 MG: 15 TABLET, FILM COATED ORAL at 21:23

## 2020-03-27 RX ADMIN — QUETIAPINE 50 MG: 25 TABLET ORAL at 21:23

## 2020-03-27 RX ADMIN — SPIRONOLACTONE 25 MG: 25 TABLET ORAL at 07:49

## 2020-03-27 RX ADMIN — FUROSEMIDE 20 MG: 20 TABLET ORAL at 07:49

## 2020-03-27 ASSESSMENT — ACTIVITIES OF DAILY LIVING (ADL)
ADLS_ACUITY_SCORE: 15

## 2020-03-27 NOTE — PLAN OF CARE
A&Ox4.  VSS, RA.  Denies pain.  Independent in room, SBA in hallways.  PIV SL.  Nicotine patch to L shoulder.  Patient is currently on a court ordered hold.  SW assisting with discharge; pending placement.

## 2020-03-27 NOTE — PLAN OF CARE
No pain/SOB. Abdomen distended but not uncomfortable. Up ad eliane. PRN atarax effective for anxiety.

## 2020-03-27 NOTE — PROGRESS NOTES
[facility]    Medicine Progress Note - Hospitalist Service       Date of Admission:  3/7/2020  Assessment & Plan   Jim Richard is a 64 year-old male with history of alcohol dependence, alcoholic cirrhosis with history of varices, depression, anxiety, coronary artery disease, obstructive sleep apnea, peripheral vascular disease, prior subdural hematoma who presents with alcohol intoxication and melena. Admitted 3/7/2020.      Cirrhosis with ascites  Alcoholic hepatitis  Hyponatremia, likely 2/2 cirrhosis/ liver failure  PTA lactulose 10 g TID, propranolol 10 mg BID, furosemide 40 mg daily, spironolactone 50 mg bid  - Continue lactulose, titrate to 2 stools/day  - Ceftriaxone IV ordered for SBP prophylaxis in setting of GIB, paracentesis did not indicate SBP.  Culture negative. 5 day course completed 3/13  - lasix 20 mg daily  - aldactone 50 mg daily 3/12, difficult with increase in dose 2/2 worsening creatinine, currently at 25 mg daily started 3/20  - s/p paracentesis 3/20 (3rd one this hospital stay), 3L removed  - Monitor LFTs and renal function per routine- stable 3/23 (alk phos elevated but this is chronic)  - monitoring hyponatremia, stable 3/26    NGUYEN  Baseline creatinine appears to be ~0.9-1.2. elevated 3/16-17 to 1.4. BP's have been ok. No obvious nephrotoxins administered, no contrast. Possible HRS type II.  - avoid nephrotoxins  - stable 1.3 on 3/26    Melena  Gastrointestinal bleed likely secondary to esophagitis  anemia  H/o Esophageal varices  Acute on chronic blood loss anemia secondary to cirrhosis and GI bleed  Presented with one week hx of melena.  Baseline hemoglobin 9-10 g/dl recent, on admission Hgb 9.4 g/dl.  Mildly tachycardic on admission otherwise blood pressure stable. Suspected slow upper GI bleed.  - GI ([facility]) consulted, EGD 3/7 did not show any bleeding ulcers, noted to have grade 1 esophageal varices with no signs of bleeding. Colonoscopy 3/10 with  "diverticulosis, 2 polyps, congested mucosa  - Continue pantoprazole BID as per GI   - Avoid anticoagulation  - transfuse <7, pt has been consented  - hgb stable 3/23 at 9.5, continue to monitor    Depression with hx of suicidal ideation  Anxiety  PTA duloxetine 60 mg daily, quetiapine 50 mg at bedtime PRN, trazodone 200 mg at bedtime, mirtazapine 15 mg at bedtime.   Poor medication compliance, had not taken meds for several weeks PTA. Psych had seen previous hospitalization. He denies ongoing SI in the ED.   - Psychiatry has seen, greatly appreciate assistance  - continue duloxetine, mirtazapine, prn trazodone [100 mg as needed at bedtime].  Also now on quetiapine 50 mg scheduled at bedtime to help with sleep per psychiatry.   - prn atarax for anxiety  - undergoing commitment proceedings with the [facility], s/p preliminary hearing 3/18.  Subsequent court hearing by phone on 3/23.  Patient is in agreement with the commitment.  Currently awaiting placement at rehab facility.     Alcohol intoxication  Alcohol use disorder  Drinking up to 1-1.75 L vodka daily. BAC on admission at 0.29. Does have hx of DT's, but no h/o withdrawal seizures.   - IV vitamins given  - withdrawal completed  - Psychiatry consulted as above  - Chemical dependency has seen. Most recent evaluation 2/13, still valid for 30 days. Possibility of [facility] program for treatment  -Currently awaiting placement at rehab facility.     JANIS on CPAP  Uses at home, declines here. Will order at discharge     COPD  Tobacco abuse  Stable  - Continue prior to admission inhalers  - declining Breo Ellipta, will change to prn. Pt states he might want at discharge \"when he might start smoking again\"    Thrombocytopenia secondary to cirrhosis  Improving last check 3/13  - platelets normalized 3/20    Diet: 2 Gram Sodium Diet  Snacks/Supplements Adult: Other; 10 am and 2pm: straw Boost  (RD); Between Meals    DVT Prophylaxis: Pneumatic Compression Devices  Leger " Catheter: not present  Code Status: DNR/DNI      Disposition Plan   Expected discharge: Stable to discharge, currently awaiting placement at rehab facility.  Appreciate social work assistance.  Entered: Hanny Knowles MD 03/27/2020, 2:47 PM     The patient's care was discussed with the Bedside Nurse and Patient.    ______________________________________________________________________    Interval History   Stable overnight. Denies cp/sob. States abdominal distension is stable.  Feels like his stools have gotten hard today.  Would like to monitor another day today and is open to increasing lactulose to 3 times daily dosing tomorrow depending on stools today.    Data reviewed today: I reviewed  All medications, new labs and imaging results over the last 24 hours. I personally reviewed no images or EKG's today.    Physical Exam   Vital Signs: Temp: 97.5  F (36.4  C) Temp src: Oral BP: 117/64 Pulse: 58 Heart Rate: 61 Resp: 18 SpO2: 96 % O2 Device: None (Room air)    Weight: 178 lbs 4.8 oz    Constitutional: Awake, alert, cooperative, no apparent distress  Respiratory: Nonlabored breathing  Cardiovascular: Minimal leg edema  GI: Abdomen appears distended, nontender  Skin/Integumen: No rashes  Neuro : moving all 4 extremities, no focal deficit noted       Data   Recent Labs   Lab 03/26/20  0644 03/23/20  0628 03/21/20  0717   WBC  --  6.0  --    HGB  --  9.5*  --    MCV  --  91  --    PLT  --  256  --    * 131* 131*   POTASSIUM 4.3 4.0 4.2   CHLORIDE 102 99 101   CO2 23 26 24   BUN 32* 30 25   CR 1.37* 1.43* 1.42*   ANIONGAP 6 6 6   KEV 8.7 8.6 8.8   GLC 84 90 83   ALBUMIN  --  2.7*  --    PROTTOTAL  --  7.0  --    BILITOTAL  --  0.8  --    ALKPHOS  --  253*  --    ALT  --  48  --    AST  --  38  --        No results found for this or any previous visit (from the past 24 hour(s)).  Medications     - MEDICATION INSTRUCTIONS -       - MEDICATION INSTRUCTIONS -         DULoxetine  60 mg Oral Daily     furosemide  20  mg Oral Daily     lactulose  10 g Oral BID     mirtazapine  15 mg Oral At Bedtime     nicotine  1 patch Transdermal Daily     nicotine   Transdermal Q8H     pantoprazole  40 mg Oral BID AC     QUEtiapine  50 mg Oral At Bedtime     sodium chloride (PF)  3 mL Intracatheter Q8H     spironolactone  25 mg Oral Daily

## 2020-03-27 NOTE — PLAN OF CARE
A&Ox4, pleasant. Continues on court hold. VSS. Denied any pain overnight. PIV SL. Nicotine patch in place to left shoulder. Is independent in room, SBA in hallways. No reports of any stools overnight. Good UOP. SW assisting with discharge; pending placement.

## 2020-03-27 NOTE — PLAN OF CARE
[id number]: Pt is A/O x4. VSS. Denies pain/SOB. Abdomen distended but not uncomfortable. BS active, passing flatus.Voiding adequately in BR. Up ad eliane, ambulating halls frequently. PRN atarax given x1 for anxiety--effective. Plan to discharge pending placement to inpatient rehab.

## 2020-03-28 PROCEDURE — 99207 ZZC CDG-MDM COMPONENT: MEETS LOW - DOWN CODED: CPT | Performed by: INTERNAL MEDICINE

## 2020-03-28 PROCEDURE — 25000132 ZZH RX MED GY IP 250 OP 250 PS 637: Mod: GY | Performed by: INTERNAL MEDICINE

## 2020-03-28 PROCEDURE — 99232 SBSQ HOSP IP/OBS MODERATE 35: CPT | Performed by: INTERNAL MEDICINE

## 2020-03-28 PROCEDURE — 25000132 ZZH RX MED GY IP 250 OP 250 PS 637: Mod: GY | Performed by: PSYCHIATRY & NEUROLOGY

## 2020-03-28 PROCEDURE — 12000000 ZZH R&B MED SURG/OB

## 2020-03-28 PROCEDURE — 25000132 ZZH RX MED GY IP 250 OP 250 PS 637: Mod: GY | Performed by: HOSPITALIST

## 2020-03-28 PROCEDURE — 25000132 ZZH RX MED GY IP 250 OP 250 PS 637: Mod: GY | Performed by: STUDENT IN AN ORGANIZED HEALTH CARE EDUCATION/TRAINING PROGRAM

## 2020-03-28 PROCEDURE — 99207 ZZC MOONLIGHTING INDICATOR: CPT | Performed by: INTERNAL MEDICINE

## 2020-03-28 RX ADMIN — PANTOPRAZOLE SODIUM 40 MG: 40 TABLET, DELAYED RELEASE ORAL at 06:47

## 2020-03-28 RX ADMIN — PANTOPRAZOLE SODIUM 40 MG: 40 TABLET, DELAYED RELEASE ORAL at 15:51

## 2020-03-28 RX ADMIN — LACTULOSE 10 G: 10 SOLUTION ORAL at 21:08

## 2020-03-28 RX ADMIN — LACTULOSE 10 G: 10 SOLUTION ORAL at 08:59

## 2020-03-28 RX ADMIN — HYDROXYZINE HYDROCHLORIDE 25 MG: 25 TABLET, FILM COATED ORAL at 15:06

## 2020-03-28 RX ADMIN — QUETIAPINE 50 MG: 25 TABLET ORAL at 21:07

## 2020-03-28 RX ADMIN — SPIRONOLACTONE 25 MG: 25 TABLET ORAL at 08:59

## 2020-03-28 RX ADMIN — HYDROXYZINE HYDROCHLORIDE 25 MG: 25 TABLET, FILM COATED ORAL at 09:04

## 2020-03-28 RX ADMIN — MIRTAZAPINE 15 MG: 15 TABLET, FILM COATED ORAL at 21:07

## 2020-03-28 RX ADMIN — FUROSEMIDE 20 MG: 20 TABLET ORAL at 08:59

## 2020-03-28 RX ADMIN — NICOTINE 1 PATCH: 14 PATCH, EXTENDED RELEASE TRANSDERMAL at 08:59

## 2020-03-28 RX ADMIN — HYDROXYZINE HYDROCHLORIDE 25 MG: 25 TABLET, FILM COATED ORAL at 21:07

## 2020-03-28 RX ADMIN — DULOXETINE 60 MG: 60 CAPSULE, DELAYED RELEASE ORAL at 08:59

## 2020-03-28 ASSESSMENT — ACTIVITIES OF DAILY LIVING (ADL)
ADLS_ACUITY_SCORE: 15

## 2020-03-28 NOTE — PLAN OF CARE
[id number] Patient A&Ox4, up independent in Room. Iv saline locked. Denies pain. CMS intact. 2g Na diet. BS active & passing Gas . Plan discharge , awaiting placement.

## 2020-03-28 NOTE — PROGRESS NOTES
[facility]    Medicine Progress Note - Hospitalist Service       Date of Admission:  3/7/2020  Assessment & Plan   Jim Richard is a 64 year-old male with history of alcohol dependence, alcoholic cirrhosis with history of varices, depression, anxiety, coronary artery disease, obstructive sleep apnea, peripheral vascular disease, prior subdural hematoma who presents with alcohol intoxication and melena. Admitted 3/7/2020.      Cirrhosis with ascites  Alcoholic hepatitis  Hyponatremia, likely 2/2 cirrhosis/ liver failure  PTA lactulose 10 g TID, propranolol 10 mg BID, furosemide 40 mg daily, spironolactone 50 mg bid  - Continue lactulose, titrate to 2 stools/day  - Ceftriaxone IV ordered for SBP prophylaxis in setting of GIB, paracentesis did not indicate SBP.  Culture negative. 5 day course completed 3/13  - lasix 20 mg daily  - aldactone 50 mg daily 3/12, difficult with increase in dose 2/2 worsening creatinine, currently at 25 mg daily started 3/20  - s/p paracentesis 3/20 (3rd one this hospital stay), 3L removed  - Monitor LFTs and renal function per routine- stable 3/23 (alk phos elevated but this is chronic)  - monitoring hyponatremia, stable 3/26    NGUYEN  Baseline creatinine appears to be ~0.9-1.2. elevated 3/16-17 to 1.4. BP's have been ok. No obvious nephrotoxins administered, no contrast. Possible HRS type II.  - avoid nephrotoxins  - stable 1.3 on 3/26    Melena  Gastrointestinal bleed likely secondary to esophagitis  anemia  H/o Esophageal varices  Acute on chronic blood loss anemia secondary to cirrhosis and GI bleed  Presented with one week hx of melena.  Baseline hemoglobin 9-10 g/dl recent, on admission Hgb 9.4 g/dl.  Mildly tachycardic on admission otherwise blood pressure stable. Suspected slow upper GI bleed.  - GI ([facility]) consulted, EGD 3/7 did not show any bleeding ulcers, noted to have grade 1 esophageal varices with no signs of bleeding. Colonoscopy 3/10 with  "diverticulosis, 2 polyps, congested mucosa  - Continue pantoprazole BID as per GI   - Avoid anticoagulation  - transfuse <7, pt has been consented  - hgb stable 3/23 at 9.5, continue to monitor    Depression with hx of suicidal ideation  Anxiety  PTA duloxetine 60 mg daily, quetiapine 50 mg at bedtime PRN, trazodone 200 mg at bedtime, mirtazapine 15 mg at bedtime.   Poor medication compliance, had not taken meds for several weeks PTA. Psych had seen previous hospitalization. He denies ongoing SI in the ED.   - Psychiatry has seen, greatly appreciate assistance  - continue duloxetine, mirtazapine, prn trazodone [100 mg as needed at bedtime].  Also now on quetiapine 50 mg scheduled at bedtime to help with sleep per psychiatry.   - prn atarax for anxiety  - undergoing commitment proceedings with the [facility], s/p preliminary hearing 3/18.  Subsequent court hearing by phone on 3/23.  Patient is in agreement with the commitment.  Currently awaiting placement at rehab facility.     Alcohol intoxication  Alcohol use disorder  Drinking up to 1-1.75 L vodka daily. BAC on admission at 0.29. Does have hx of DT's, but no h/o withdrawal seizures.   - IV vitamins given  - withdrawal completed  - Psychiatry consulted as above  - Chemical dependency has seen. Most recent evaluation 2/13, still valid for 30 days. Possibility of [place] program for treatment  -Currently awaiting placement at rehab facility.     JANIS on CPAP  Uses at home, declines here. Will order at discharge     COPD  Tobacco abuse  Stable  - Continue prior to admission inhalers  - declining Breo Ellipta, will change to prn. Pt states he might want at discharge \"when he might start smoking again\"    Thrombocytopenia secondary to cirrhosis  Improving last check 3/13  - platelets normalized 3/20    Diet: 2 Gram Sodium Diet  Snacks/Supplements Adult: Other; 10 am and 2pm: straw Boost  (RD); Between Meals    DVT Prophylaxis: Pneumatic Compression Devices  Leger " Catheter: not present  Code Status: DNR/DNI      Disposition Plan   Expected discharge: Stable to discharge, currently awaiting placement at rehab facility.  Appreciate social work assistance.  Entered: Gali Snyder MD 03/28/2020, 12:03 PM     The patient's care was discussed with the Bedside Nurse and Patient.    ______________________________________________________________________    Interval History   Patient seen and examined.  No acute events over night.  No fevers or chills. No chest pain or SOB. Answered patient questions.    Data reviewed today: I reviewed  All medications, new labs and imaging results over the last 24 hours. I personally reviewed no images or EKG's today.    Physical Exam   Vital Signs: Temp: 96.6  F (35.9  C) Temp src: Axillary BP: 125/66   Heart Rate: 65 Resp: 16 SpO2: 95 % O2 Device: None (Room air)    Weight: 178 lbs 4.8 oz    Constitutional: Awake, alert, cooperative, no apparent distress  Respiratory: Nonlabored breathing  Cardiovascular: Minimal leg edema  GI: Abdomen appears distended, nontender  Skin/Integumen: No rashes  Neuro : moving all 4 extremities, no focal deficit noted       Data   Recent Labs   Lab 03/26/20  0644 03/23/20  0628   WBC  --  6.0   HGB  --  9.5*   MCV  --  91   PLT  --  256   * 131*   POTASSIUM 4.3 4.0   CHLORIDE 102 99   CO2 23 26   BUN 32* 30   CR 1.37* 1.43*   ANIONGAP 6 6   KEV 8.7 8.6   GLC 84 90   ALBUMIN  --  2.7*   PROTTOTAL  --  7.0   BILITOTAL  --  0.8   ALKPHOS  --  253*   ALT  --  48   AST  --  38       No results found for this or any previous visit (from the past 24 hour(s)).  Medications     - MEDICATION INSTRUCTIONS -       - MEDICATION INSTRUCTIONS -         DULoxetine  60 mg Oral Daily     furosemide  20 mg Oral Daily     lactulose  10 g Oral BID     mirtazapine  15 mg Oral At Bedtime     nicotine  1 patch Transdermal Daily     nicotine   Transdermal Q8H     pantoprazole  40 mg Oral BID AC     QUEtiapine  50 mg Oral At Bedtime      sodium chloride (PF)  3 mL Intracatheter Q8H     spironolactone  25 mg Oral Daily

## 2020-03-28 NOTE — PLAN OF CARE
Pt A/Ox4, VSS on RA, CIWA 0.  Atarax given x1 for mild anxiety. Nicotine patch in place on L upper arm.  Up independently, good appetite.  Lactulose continued. Discharge to rehab pending placement, continue to monitor.

## 2020-03-28 NOTE — PLAN OF CARE
A&Ox4.  VSS, RA.  Independent in room, SBA in hallways.  Denies pain.  PIV SL.  Discharge pending placement.

## 2020-03-29 LAB
ANION GAP SERPL CALCULATED.3IONS-SCNC: 5 MMOL/L (ref 3–14)
BUN SERPL-MCNC: 32 MG/DL (ref 7–30)
CALCIUM SERPL-MCNC: 8.8 MG/DL (ref 8.5–10.1)
CHLORIDE SERPL-SCNC: 104 MMOL/L (ref 94–109)
CO2 SERPL-SCNC: 26 MMOL/L (ref 20–32)
CREAT SERPL-MCNC: 1.47 MG/DL (ref 0.66–1.25)
ERYTHROCYTE [DISTWIDTH] IN BLOOD BY AUTOMATED COUNT: 14.7 % (ref 10–15)
GFR SERPL CREATININE-BSD FRML MDRD: 49 ML/MIN/{1.73_M2}
GLUCOSE SERPL-MCNC: 84 MG/DL (ref 70–99)
HCT VFR BLD AUTO: 25.9 % (ref 40–53)
HGB BLD-MCNC: 8.7 G/DL (ref 13.3–17.7)
MCH RBC QN AUTO: 30 PG (ref 26.5–33)
MCHC RBC AUTO-ENTMCNC: 33.6 G/DL (ref 31.5–36.5)
MCV RBC AUTO: 89 FL (ref 78–100)
PLATELET # BLD AUTO: 240 10E9/L (ref 150–450)
POTASSIUM SERPL-SCNC: 4.4 MMOL/L (ref 3.4–5.3)
RBC # BLD AUTO: 2.9 10E12/L (ref 4.4–5.9)
SODIUM SERPL-SCNC: 135 MMOL/L (ref 133–144)
WBC # BLD AUTO: 5.1 10E9/L (ref 4–11)

## 2020-03-29 PROCEDURE — 85027 COMPLETE CBC AUTOMATED: CPT | Performed by: INTERNAL MEDICINE

## 2020-03-29 PROCEDURE — 36415 COLL VENOUS BLD VENIPUNCTURE: CPT | Performed by: INTERNAL MEDICINE

## 2020-03-29 PROCEDURE — 99207 ZZC CDG-MDM COMPONENT: MEETS LOW - DOWN CODED: CPT | Performed by: INTERNAL MEDICINE

## 2020-03-29 PROCEDURE — 25000132 ZZH RX MED GY IP 250 OP 250 PS 637: Mod: GY | Performed by: INTERNAL MEDICINE

## 2020-03-29 PROCEDURE — 25000132 ZZH RX MED GY IP 250 OP 250 PS 637: Mod: GY | Performed by: PSYCHIATRY & NEUROLOGY

## 2020-03-29 PROCEDURE — 12000000 ZZH R&B MED SURG/OB

## 2020-03-29 PROCEDURE — 99207 ZZC MOONLIGHTING INDICATOR: CPT | Performed by: INTERNAL MEDICINE

## 2020-03-29 PROCEDURE — 25000132 ZZH RX MED GY IP 250 OP 250 PS 637: Mod: GY | Performed by: HOSPITALIST

## 2020-03-29 PROCEDURE — 99232 SBSQ HOSP IP/OBS MODERATE 35: CPT | Performed by: INTERNAL MEDICINE

## 2020-03-29 PROCEDURE — 25000132 ZZH RX MED GY IP 250 OP 250 PS 637: Mod: GY | Performed by: STUDENT IN AN ORGANIZED HEALTH CARE EDUCATION/TRAINING PROGRAM

## 2020-03-29 PROCEDURE — 80048 BASIC METABOLIC PNL TOTAL CA: CPT | Performed by: INTERNAL MEDICINE

## 2020-03-29 RX ADMIN — LACTULOSE 10 G: 10 SOLUTION ORAL at 08:01

## 2020-03-29 RX ADMIN — FUROSEMIDE 20 MG: 20 TABLET ORAL at 08:01

## 2020-03-29 RX ADMIN — QUETIAPINE 50 MG: 25 TABLET ORAL at 22:04

## 2020-03-29 RX ADMIN — PANTOPRAZOLE SODIUM 40 MG: 40 TABLET, DELAYED RELEASE ORAL at 06:45

## 2020-03-29 RX ADMIN — HYDROXYZINE HYDROCHLORIDE 25 MG: 25 TABLET, FILM COATED ORAL at 22:04

## 2020-03-29 RX ADMIN — TRAZODONE HYDROCHLORIDE 100 MG: 50 TABLET ORAL at 22:07

## 2020-03-29 RX ADMIN — SPIRONOLACTONE 25 MG: 25 TABLET ORAL at 08:01

## 2020-03-29 RX ADMIN — PANTOPRAZOLE SODIUM 40 MG: 40 TABLET, DELAYED RELEASE ORAL at 16:07

## 2020-03-29 RX ADMIN — HYDROXYZINE HYDROCHLORIDE 25 MG: 25 TABLET, FILM COATED ORAL at 10:11

## 2020-03-29 RX ADMIN — MIRTAZAPINE 15 MG: 15 TABLET, FILM COATED ORAL at 22:04

## 2020-03-29 RX ADMIN — NICOTINE 1 PATCH: 14 PATCH, EXTENDED RELEASE TRANSDERMAL at 08:00

## 2020-03-29 RX ADMIN — LACTULOSE 10 G: 10 SOLUTION ORAL at 20:45

## 2020-03-29 RX ADMIN — HYDROXYZINE HYDROCHLORIDE 25 MG: 25 TABLET, FILM COATED ORAL at 16:07

## 2020-03-29 RX ADMIN — DULOXETINE 60 MG: 60 CAPSULE, DELAYED RELEASE ORAL at 08:01

## 2020-03-29 ASSESSMENT — ACTIVITIES OF DAILY LIVING (ADL)
ADLS_ACUITY_SCORE: 15

## 2020-03-29 NOTE — PLAN OF CARE
A&Ox4.  VSS, RA.  Denies pain.  Tolerating 2 gram sodium restricted diet without problems.  Independent in room/SBA in hallways.  PIV SL.  Discharge pending inpatient rehab placement.

## 2020-03-29 NOTE — PLAN OF CARE
7 to 11pm: A&O, VSS, on RA, LS clear, BS audible and active, Continent of both   B and B, Independent, ambulates frequently in the hallway, denies pain, PRN atarax for anxiety.  Discharge pending placement.

## 2020-03-29 NOTE — PLAN OF CARE
Tolerating 2gm diet. BM today. Up ambulating ad eliane. Atarax for anxiety. Discharge pending placement.

## 2020-03-29 NOTE — PROGRESS NOTES
[facility]    Medicine Progress Note - Hospitalist Service       Date of Admission:  3/7/2020  Assessment & Plan   Jim Richard is a 64 year-old male with history of alcohol dependence, alcoholic cirrhosis with history of varices, depression, anxiety, coronary artery disease, obstructive sleep apnea, peripheral vascular disease, prior subdural hematoma who presents with alcohol intoxication and melena. Admitted 3/7/2020.      Cirrhosis with ascites  Alcoholic hepatitis  Hyponatremia, likely 2/2 cirrhosis/ liver failure  PTA lactulose 10 g TID, propranolol 10 mg BID, furosemide 40 mg daily, spironolactone 50 mg bid  - Continue lactulose, titrate to 2 stools/day  - Ceftriaxone IV ordered for SBP prophylaxis in setting of GIB, paracentesis did not indicate SBP.  Culture negative. 5 day course completed 3/13  - lasix 20 mg daily  - aldactone 50 mg daily 3/12, difficult with increase in dose 2/2 worsening creatinine, currently at 25 mg daily started 3/20  - s/p paracentesis 3/20 (3rd one this hospital stay), 3L removed  - Monitor LFTs and renal function per routine- stable 3/23 (alk phos elevated but this is chronic)  - monitoring hyponatremia, stable 3/26    NGUYEN  Baseline creatinine appears to be ~0.9-1.2. elevated 3/16-17 to 1.4. BP's have been ok. No obvious nephrotoxins administered, no contrast. Possible HRS type II.  - avoid nephrotoxins  - Up to 1.47 on 3/29    Melena  Gastrointestinal bleed likely secondary to esophagitis  anemia  H/o Esophageal varices  Acute on chronic blood loss anemia secondary to cirrhosis and GI bleed  Presented with one week hx of melena.  Baseline hemoglobin 9-10 g/dl recent, on admission Hgb 9.4 g/dl.  Mildly tachycardic on admission otherwise blood pressure stable. Suspected slow upper GI bleed.  - GI ([facility]) consulted, EGD 3/7 did not show any bleeding ulcers, noted to have grade 1 esophageal varices with no signs of bleeding. Colonoscopy 3/10 with  "diverticulosis, 2 polyps, congested mucosa  - Continue pantoprazole BID as per GI   - Avoid anticoagulation  - transfuse <7, pt has been consented  - hgb stable 3/23 at 9.5, continue to monitor    Depression with hx of suicidal ideation  Anxiety  PTA duloxetine 60 mg daily, quetiapine 50 mg at bedtime PRN, trazodone 200 mg at bedtime, mirtazapine 15 mg at bedtime.   Poor medication compliance, had not taken meds for several weeks PTA. Psych had seen previous hospitalization. He denies ongoing SI in the ED.   - Psychiatry has seen, greatly appreciate assistance  - continue duloxetine, mirtazapine, prn trazodone [100 mg as needed at bedtime].  Also now on quetiapine 50 mg scheduled at bedtime to help with sleep per psychiatry.   - prn atarax for anxiety  - undergoing commitment proceedings with the [facility], s/p preliminary hearing 3/18.  Subsequent court hearing by phone on 3/23.  Patient is in agreement with the commitment.  Currently awaiting placement at rehab facility.     Alcohol intoxication  Alcohol use disorder  Drinking up to 1-1.75 L vodka daily. BAC on admission at 0.29. Does have hx of DT's, but no h/o withdrawal seizures.   - IV vitamins given  - withdrawal completed  - Psychiatry consulted as above  - Chemical dependency has seen. Most recent evaluation 2/13, still valid for 30 days. Possibility of [facility] program for treatment  -Currently awaiting placement at rehab facility.     JANIS on CPAP  Uses at home, declines here. Will order at discharge     COPD  Tobacco abuse  Stable  - Continue prior to admission inhalers  - declining Breo Ellipta, will change to prn. Pt states he might want at discharge \"when he might start smoking again\"    Thrombocytopenia secondary to cirrhosis  Improving last check 3/13  - platelets normalized 3/20    Diet: 2 Gram Sodium Diet  Snacks/Supplements Adult: Other; 10 am and 2pm: straw Boost  (RD); Between Meals    DVT Prophylaxis: Pneumatic Compression Devices  Leger " Catheter: not present  Code Status: DNR/DNI      Disposition Plan   Expected discharge: Stable to discharge, currently awaiting placement at rehab facility.  Appreciate social work assistance.  Entered: Gali Snyder MD 03/29/2020, 11:39 AM     The patient's care was discussed with the Bedside Nurse and Patient.    ______________________________________________________________________    Interval History   Patient seen and examined.  No acute events over night.  No fevers or chills. No chest pain or SOB. Answered patient questions. Waiting placement.     Data reviewed today: I reviewed  All medications, new labs and imaging results over the last 24 hours. I personally reviewed no images or EKG's today.    Physical Exam   Vital Signs: Temp: 95.3  F (35.2  C) Temp src: Axillary BP: 103/64   Heart Rate: 77 Resp: 16 SpO2: 99 % O2 Device: None (Room air)    Weight: 175 lbs 9.6 oz    Constitutional: Awake, alert, cooperative, no apparent distress  Respiratory: Nonlabored breathing  Cardiovascular: Minimal leg edema  GI: Abdomen appears distended, nontender  Skin/Integumen: No rashes  Neuro : moving all 4 extremities, no focal deficit noted       Data   Recent Labs   Lab 03/29/20  0620 03/26/20  0644 03/23/20  0628   WBC 5.1  --  6.0   HGB 8.7*  --  9.5*   MCV 89  --  91     --  256    131* 131*   POTASSIUM 4.4 4.3 4.0   CHLORIDE 104 102 99   CO2 26 23 26   BUN 32* 32* 30   CR 1.47* 1.37* 1.43*   ANIONGAP 5 6 6   KEV 8.8 8.7 8.6   GLC 84 84 90   ALBUMIN  --   --  2.7*   PROTTOTAL  --   --  7.0   BILITOTAL  --   --  0.8   ALKPHOS  --   --  253*   ALT  --   --  48   AST  --   --  38       No results found for this or any previous visit (from the past 24 hour(s)).  Medications     - MEDICATION INSTRUCTIONS -       - MEDICATION INSTRUCTIONS -         DULoxetine  60 mg Oral Daily     furosemide  20 mg Oral Daily     lactulose  10 g Oral BID     mirtazapine  15 mg Oral At Bedtime     nicotine  1 patch Transdermal  Daily     nicotine   Transdermal Q8H     pantoprazole  40 mg Oral BID AC     QUEtiapine  50 mg Oral At Bedtime     sodium chloride (PF)  3 mL Intracatheter Q8H     spironolactone  25 mg Oral Daily

## 2020-03-30 LAB
ANION GAP SERPL CALCULATED.3IONS-SCNC: 6 MMOL/L (ref 3–14)
BUN SERPL-MCNC: 32 MG/DL (ref 7–30)
CALCIUM SERPL-MCNC: 8.7 MG/DL (ref 8.5–10.1)
CHLORIDE SERPL-SCNC: 102 MMOL/L (ref 94–109)
CO2 SERPL-SCNC: 25 MMOL/L (ref 20–32)
CREAT SERPL-MCNC: 1.45 MG/DL (ref 0.66–1.25)
ERYTHROCYTE [DISTWIDTH] IN BLOOD BY AUTOMATED COUNT: 14.8 % (ref 10–15)
GFR SERPL CREATININE-BSD FRML MDRD: 50 ML/MIN/{1.73_M2}
GLUCOSE SERPL-MCNC: 83 MG/DL (ref 70–99)
HCT VFR BLD AUTO: 26.9 % (ref 40–53)
HGB BLD-MCNC: 9 G/DL (ref 13.3–17.7)
MCH RBC QN AUTO: 29.8 PG (ref 26.5–33)
MCHC RBC AUTO-ENTMCNC: 33.5 G/DL (ref 31.5–36.5)
MCV RBC AUTO: 89 FL (ref 78–100)
PLATELET # BLD AUTO: 231 10E9/L (ref 150–450)
POTASSIUM SERPL-SCNC: 4.4 MMOL/L (ref 3.4–5.3)
RBC # BLD AUTO: 3.02 10E12/L (ref 4.4–5.9)
SODIUM SERPL-SCNC: 133 MMOL/L (ref 133–144)
WBC # BLD AUTO: 5.5 10E9/L (ref 4–11)

## 2020-03-30 PROCEDURE — 25000132 ZZH RX MED GY IP 250 OP 250 PS 637: Mod: GY | Performed by: PSYCHIATRY & NEUROLOGY

## 2020-03-30 PROCEDURE — 36415 COLL VENOUS BLD VENIPUNCTURE: CPT | Performed by: INTERNAL MEDICINE

## 2020-03-30 PROCEDURE — 99232 SBSQ HOSP IP/OBS MODERATE 35: CPT | Performed by: INTERNAL MEDICINE

## 2020-03-30 PROCEDURE — 85027 COMPLETE CBC AUTOMATED: CPT | Performed by: INTERNAL MEDICINE

## 2020-03-30 PROCEDURE — 12000000 ZZH R&B MED SURG/OB

## 2020-03-30 PROCEDURE — 25000132 ZZH RX MED GY IP 250 OP 250 PS 637: Mod: GY | Performed by: INTERNAL MEDICINE

## 2020-03-30 PROCEDURE — 80048 BASIC METABOLIC PNL TOTAL CA: CPT | Performed by: INTERNAL MEDICINE

## 2020-03-30 PROCEDURE — 25000132 ZZH RX MED GY IP 250 OP 250 PS 637: Mod: GY | Performed by: HOSPITALIST

## 2020-03-30 PROCEDURE — 25000132 ZZH RX MED GY IP 250 OP 250 PS 637: Mod: GY | Performed by: STUDENT IN AN ORGANIZED HEALTH CARE EDUCATION/TRAINING PROGRAM

## 2020-03-30 RX ORDER — PANTOPRAZOLE SODIUM 40 MG/1
40 TABLET, DELAYED RELEASE ORAL
Status: DISCONTINUED | OUTPATIENT
Start: 2020-03-31 | End: 2020-04-09 | Stop reason: HOSPADM

## 2020-03-30 RX ADMIN — PANTOPRAZOLE SODIUM 40 MG: 40 TABLET, DELAYED RELEASE ORAL at 06:47

## 2020-03-30 RX ADMIN — LACTULOSE 10 G: 10 SOLUTION ORAL at 08:23

## 2020-03-30 RX ADMIN — SPIRONOLACTONE 25 MG: 25 TABLET ORAL at 08:23

## 2020-03-30 RX ADMIN — LACTULOSE 10 G: 10 SOLUTION ORAL at 22:22

## 2020-03-30 RX ADMIN — DULOXETINE 60 MG: 60 CAPSULE, DELAYED RELEASE ORAL at 08:23

## 2020-03-30 RX ADMIN — FUROSEMIDE 20 MG: 20 TABLET ORAL at 08:23

## 2020-03-30 RX ADMIN — QUETIAPINE 50 MG: 25 TABLET ORAL at 22:22

## 2020-03-30 RX ADMIN — HYDROXYZINE HYDROCHLORIDE 25 MG: 25 TABLET, FILM COATED ORAL at 22:23

## 2020-03-30 RX ADMIN — HYDROXYZINE HYDROCHLORIDE 25 MG: 25 TABLET, FILM COATED ORAL at 16:02

## 2020-03-30 RX ADMIN — NICOTINE 1 PATCH: 14 PATCH, EXTENDED RELEASE TRANSDERMAL at 08:24

## 2020-03-30 RX ADMIN — MIRTAZAPINE 15 MG: 15 TABLET, FILM COATED ORAL at 22:23

## 2020-03-30 RX ADMIN — HYDROXYZINE HYDROCHLORIDE 25 MG: 25 TABLET, FILM COATED ORAL at 10:06

## 2020-03-30 ASSESSMENT — ACTIVITIES OF DAILY LIVING (ADL)
ADLS_ACUITY_SCORE: 15

## 2020-03-30 NOTE — PROGRESS NOTES
[facility]    Medicine Progress Note - Hospitalist Service       Date of Admission:  3/7/2020  Assessment & Plan   Jim Richard is a 64 year-old male with history of alcohol dependence, alcoholic cirrhosis with history of varices, depression, anxiety, coronary artery disease, obstructive sleep apnea, peripheral vascular disease, prior subdural hematoma who presents with alcohol intoxication and melena. Admitted 3/7/2020.      Gastrointestinal bleed likely secondary to esophagitis  Melena  H/o esophageal varices  Acute on chronic blood loss anemia secondary to cirrhosis and GI bleed  Presented with one week hx of melena.  Baseline hemoglobin 9-10 g/dl recent, on admission Hgb 9.4 g/dl.  Mildly tachycardic on admission otherwise blood pressure stable. Suspected slow upper GI bleed. GI ([facility]) consulted, EGD 3/7 did not show any bleeding ulcers, noted to have grade 1 esophageal varices with no signs of bleeding. Colonoscopy 3/10 with diverticulosis, 2 polyps, congested mucosa.  - Continue pantoprazole, has completed >3 weeks BID therapy will reduce to once daily and continue indefinitely  - Avoid anticoagulation if possible  - Hemoglobin stable     Cirrhosis with ascites  Alcoholic hepatitis  Completed 5 day course of ceftriaxone IV for SBP prophylaxis in setting of GIB.   - Continue lactulose, goal of 2-3 loose stools daily  - Continue furosemide 20 mg daily, spironolactone 25 mg daily  - Paracentesis PRN (last done 3/20 with 3L removed)  - LFTs stable 3/23 (alkaline phos stably elevated)   - 2 gram sodium restricted diet     Acute kidney injury   Baseline creatinine appears to be ~0.9-1.2. elevated 3/16-17 to 1.4. BP's have been ok. No obvious nephrotoxins administered, no contrast. Possible HRS type II.  - Creatinine stable in 1.4 range. Possible new baseline.   - Avoid nephrotoxins      Hyponatremia, likely secondary to cirrhosis   Sodium intermittently in low 130s.   - Sodium stable.       Depression with history of suicidal ideation  Anxiety  PTA duloxetine 60 mg daily, quetiapine 50 mg at bedtime PRN, trazodone 200 mg at bedtime, mirtazapine 15 mg at bedtime. Poor medication compliance, had not taken meds for several weeks PTA. Psych had seen previous hospitalization.   - Psychiatry has seen, greatly appreciate assistance  - Continue duloxetine, mirtazapine, PRN trazodone.  Also now on quetiapine 50 mg scheduled at bedtime to help with sleep per psychiatry.   - Hydroxyzine PRN for anxiety     Alcohol intoxication  Alcohol dependency   Drinking up to 1.75 L vodka daily. BARNEY on admission at 0.29. Does have hx of DT's, but no h/o withdrawal seizures. Treated for withdrawal on admission.   - Currently pursuing commitment, awaiting placement     JANIS on CPAP  Uses at home, declines here. Will order at discharge     COPD  Tobacco abuse  Stable.  - Continue nicotine patch  - Patient has been declining inhalers     Thrombocytopenia   Moise of 69k 3/9, normalized 3/20. Likely due to toxicity from alcohol and consumption from bleeding given normalization.  - Monitor periodically    Diet: 2 Gram Sodium Diet  Snacks/Supplements Adult: Other; 10 am and 2pm: straw Boost  (RD); Between Meals    DVT Prophylaxis: Pneumatic Compression Devices  Leger Catheter: not present  Code Status: DNR/DNI      Disposition Plan   Expected discharge: Awaiting commitment process.   Entered: Bj Parekh MD 03/30/2020, 9:41 AM       The patient's care was discussed with the Care Coordinator/ and Patient.    Bj Parekh MD  Hospitalist Service  [facility]    ______________________________________________________________________    Interval History   No acute events overnight. Denies any chest pain or shortness of breath. Abdominal distension stable, denies any pain. Eating/drinking okay.    Data reviewed today: I reviewed all medications, new labs and imaging results over the last 24 hours.  I personally reviewed no images or EKG's today.    Physical Exam   Vital Signs: Temp: 96.6  F (35.9  C) Temp src: Oral BP: 112/56   Heart Rate: 61 Resp: 18 SpO2: 97 % O2 Device: None (Room air)    Weight: 176 lbs 3 oz    Constitutional: NAD  Respiratory: Clear to auscultation bilaterally, good air movement bilaterally  Cardiovascular: RRR. No peripheral edema.  GI: Abdomen distended, + ascites, non-tender.   Skin/Integumen: Warm, dry  Other:      Data   Recent Labs   Lab 03/30/20  0633 03/29/20  0620 03/26/20  0644   WBC 5.5 5.1  --    HGB 9.0* 8.7*  --    MCV 89 89  --     240  --     135 131*   POTASSIUM 4.4 4.4 4.3   CHLORIDE 102 104 102   CO2 25 26 23   BUN 32* 32* 32*   CR 1.45* 1.47* 1.37*   ANIONGAP 6 5 6   KEV 8.7 8.8 8.7   GLC 83 84 84       No results found for this or any previous visit (from the past 24 hour(s)).  Medications     - MEDICATION INSTRUCTIONS -       - MEDICATION INSTRUCTIONS -         DULoxetine  60 mg Oral Daily     furosemide  20 mg Oral Daily     lactulose  10 g Oral BID     mirtazapine  15 mg Oral At Bedtime     nicotine  1 patch Transdermal Daily     nicotine   Transdermal Q8H     pantoprazole  40 mg Oral BID AC     QUEtiapine  50 mg Oral At Bedtime     sodium chloride (PF)  3 mL Intracatheter Q8H     spironolactone  25 mg Oral Daily

## 2020-03-30 NOTE — PROGRESS NOTES
DANIELE Note:    D/I:  SW is following for discharge planning.  DANIELE placed call to Michaela with [facility] ([phone]) to inquire about acceptance for patient. Left VM.  Placed call to Jeni Nichols ([phone]) with [facility] to inquire about patient's assigned  with the [facility].  Omayra Angela ([phone]) is patient's assigned .  DANIELE placed call to Omayra to discuss patient's discharge.  She is going to call some facilities to see if they would have availability for patient and will update SW for referrals to be sent.  Per Omayra, since patient is a full commitment, [employer] will pay for his stay in treatment.  DANIELE updated patient.    P: DANIELE will continue to assist and follow for discharge.    JULIA Navarro, [facility]  [phone]  [facility]

## 2020-03-30 NOTE — PLAN OF CARE
A&Ox4.  VSS, RA.  Independent.  Denies pain.  Tolerating 2gram Na diet without problems.  PIV SL.  Discharge pending placement.

## 2020-03-30 NOTE — PLAN OF CARE
Primary Diagnosis: Acute blood loss anemia, on court hold  Orientation: A&Ox4   Aggression Stop Light: Green  Mobility: Independent in room and hallway   Pain Management: Denied pain  Diet: 2g Na   Bowel/Bladder: Continent   Abnormal Lab/Assessments:N/A  Drain/Device/Wound: Bruised, scabs excoriated perianal/groin--powder applied by patient, paracentesis 3/20 bandaid CDI  Consults: Psych. GI   D/C Day/Goals/Place: On court hold- signed papers committing to rehab facility, placement pending, SW assisting    Shift Note:     Tolerating 2gm diet. BM x4 today. Up ambulating ad eliane. PRN Atarax for anxiety. Discharge pending placement.

## 2020-03-30 NOTE — PLAN OF CARE
A&Ox4. VSS on RA. Denies pain/nausea. Atarax given x1 for anxiety. Nicotine patch to L shoulder. Scheduled lactulose, per patient had 2 loose stools today. Independent, ambulated halls throughout day. 2gram Na diet, tolerating. PIV SL. Discharge pending placement.

## 2020-03-31 PROCEDURE — 25000132 ZZH RX MED GY IP 250 OP 250 PS 637: Mod: GY | Performed by: INTERNAL MEDICINE

## 2020-03-31 PROCEDURE — 12000000 ZZH R&B MED SURG/OB

## 2020-03-31 PROCEDURE — 25000132 ZZH RX MED GY IP 250 OP 250 PS 637: Mod: GY | Performed by: STUDENT IN AN ORGANIZED HEALTH CARE EDUCATION/TRAINING PROGRAM

## 2020-03-31 PROCEDURE — 99232 SBSQ HOSP IP/OBS MODERATE 35: CPT | Performed by: INTERNAL MEDICINE

## 2020-03-31 PROCEDURE — 25000132 ZZH RX MED GY IP 250 OP 250 PS 637: Mod: GY | Performed by: PSYCHIATRY & NEUROLOGY

## 2020-03-31 PROCEDURE — 25000132 ZZH RX MED GY IP 250 OP 250 PS 637: Mod: GY | Performed by: HOSPITALIST

## 2020-03-31 RX ADMIN — SPIRONOLACTONE 25 MG: 25 TABLET ORAL at 09:19

## 2020-03-31 RX ADMIN — HYDROXYZINE HYDROCHLORIDE 25 MG: 25 TABLET, FILM COATED ORAL at 21:38

## 2020-03-31 RX ADMIN — LACTULOSE 10 G: 10 SOLUTION ORAL at 09:19

## 2020-03-31 RX ADMIN — NICOTINE 1 PATCH: 14 PATCH, EXTENDED RELEASE TRANSDERMAL at 09:19

## 2020-03-31 RX ADMIN — HYDROXYZINE HYDROCHLORIDE 25 MG: 25 TABLET, FILM COATED ORAL at 09:26

## 2020-03-31 RX ADMIN — MIRTAZAPINE 15 MG: 15 TABLET, FILM COATED ORAL at 21:39

## 2020-03-31 RX ADMIN — TRAZODONE HYDROCHLORIDE 100 MG: 50 TABLET ORAL at 21:38

## 2020-03-31 RX ADMIN — FUROSEMIDE 20 MG: 20 TABLET ORAL at 09:19

## 2020-03-31 RX ADMIN — PANTOPRAZOLE SODIUM 40 MG: 40 TABLET, DELAYED RELEASE ORAL at 06:51

## 2020-03-31 RX ADMIN — LACTULOSE 10 G: 10 SOLUTION ORAL at 21:38

## 2020-03-31 RX ADMIN — QUETIAPINE 50 MG: 25 TABLET ORAL at 21:38

## 2020-03-31 RX ADMIN — HYDROXYZINE HYDROCHLORIDE 25 MG: 25 TABLET, FILM COATED ORAL at 15:21

## 2020-03-31 RX ADMIN — DULOXETINE 60 MG: 60 CAPSULE, DELAYED RELEASE ORAL at 09:19

## 2020-03-31 ASSESSMENT — ACTIVITIES OF DAILY LIVING (ADL)
ADLS_ACUITY_SCORE: 15

## 2020-03-31 NOTE — PLAN OF CARE
7 to 11pm: A&O, VSS, on RA, LS clear, BS audible and active, Continent of both   B and B, Independent, ambulates frequently in the hallway, denies pain, PRN atarax for anxiety.  Discharge pending placement to a treatment facility.

## 2020-03-31 NOTE — PROGRESS NOTES
"CLINICAL NUTRITION SERVICES - REASSESSMENT NOTE      Malnutrition: (3/9)  % Weight Loss: unable to assess (last recorded wt was 2/25)  % Intake:  No decreased intake noted (pt denies any decreased po intake)  Subcutaneous Fat Loss:  None observed  Muscle Loss:  Temporal region - mild depletion  Fluid Retention: trace     Malnutrition Diagnosis: Patient does not meet two of the above criteria necessary for diagnosing malnutrition, however, suspect some risk of malnutrition 2' to ETOH and recent diarrhea/GIB       EVALUATION OF PROGRESS TOWARD GOALS   Diet:  2 gram Na + Strawberry Boost BID between meals     Intake/Tolerance:  Visited with patient via phone this morning (unable to visit in person due to distancing precautions with COVID-19 pandemic).  Patient tells me that his appetite and intake continue to be good.  He is eating 100% of meals and \"I'm holding my own at 180\" (pounds).  He is taking 100% of meals and states that he is able to consume it within 5 minutes of receiving.       NEW FINDINGS:   Awaiting placement for discharge     Previous Goals (3/24):   Pt to consume % of meals TID  Evaluation: Met    Previous Nutrition Diagnosis (3/24):   No nutrition diagnosis identified at this time  Evaluation: No change      CURRENT NUTRITION DIAGNOSIS  No nutrition diagnosis identified at this time    INTERVENTIONS  Recommendations / Nutrition Prescription  Continue 2 gram Na diet + Boost BID between meals     Implementation  None     Goals  Patient will continue to consume at least % of meals and supplements     MONITORING AND EVALUATION:  Progress towards goals will be monitored and evaluated per protocol and Practice Guidelines    Nesha Muñoz RD, LD, CNSC   Clinical Dietitian - [facility]           "

## 2020-03-31 NOTE — PLAN OF CARE
Shift Note: VSS, afebrile, no pain , did req atarax x1. Ambulating, eating well, just waiting on placement.

## 2020-03-31 NOTE — PROGRESS NOTES
[facility]    Medicine Progress Note - Hospitalist Service       Date of Admission:  3/7/2020  Assessment & Plan   Jim Richard is a 64 year-old male with history of alcohol dependence, alcoholic cirrhosis with history of varices, depression, anxiety, coronary artery disease, obstructive sleep apnea, peripheral vascular disease, prior subdural hematoma who presents with alcohol intoxication and melena. Admitted 3/7/2020.      Gastrointestinal bleed likely secondary to esophagitis  Melena  H/o esophageal varices  Acute on chronic blood loss anemia secondary to cirrhosis and GI bleed  Presented with one week hx of melena.  Baseline hemoglobin 9-10 g/dl recent, on admission Hgb 9.4 g/dl.  Mildly tachycardic on admission otherwise blood pressure stable. Suspected slow upper GI bleed. GI ([facility]) consulted, EGD 3/7 did not show any bleeding ulcers, noted to have grade 1 esophageal varices with no signs of bleeding. Colonoscopy 3/10 with diverticulosis, 2 polyps, congested mucosa.  - Continue pantoprazole daily, continue indefinitely   - Avoid anticoagulation if possible  - Hemoglobin stable     Cirrhosis with ascites  Alcoholic hepatitis  Completed 5 day course of ceftriaxone IV for SBP prophylaxis in setting of GIB.   - Continue lactulose, goal of 2-3 loose stools daily  - Continue furosemide 20 mg daily, spironolactone 25 mg daily  - Paracentesis PRN (last done 3/20 with 3L removed)  - LFTs stable 3/23 (alkaline phos stably elevated). Monitor periodically.   - 2 gram sodium restricted diet     Acute kidney injury   Baseline creatinine appears to be ~0.9-1.2. elevated 3/16-17 to 1.4. BP's have been ok. No obvious nephrotoxins administered, no contrast. Possible HRS type II.  - Creatinine stable in 1.4 range. Possible new baseline.   - Avoid nephrotoxins   - Monitor BMP periodically     Hyponatremia, likely secondary to cirrhosis   Sodium intermittently in low 130s.   - Sodium stable 3/30  -  Monitor periodically      Depression with history of suicidal ideation  Anxiety  PTA duloxetine 60 mg daily, quetiapine 50 mg at bedtime PRN, trazodone 200 mg at bedtime, mirtazapine 15 mg at bedtime. Poor medication compliance, had not taken meds for several weeks PTA. Psych had seen previous hospitalization.   - Psychiatry has seen, greatly appreciate assistance  - Continue duloxetine, mirtazapine, PRN trazodone.  Also now on quetiapine 50 mg scheduled at bedtime to help with sleep per psychiatry.   - Hydroxyzine PRN for anxiety     Alcohol intoxication  Alcohol dependency   Drinking up to 1.75 L vodka daily. BARNEY on admission at 0.29. Does have hx of DT's, but no h/o withdrawal seizures. Treated for withdrawal on admission.   - Currently pursuing commitment, awaiting placement     JANIS on CPAP  Uses at home, declines here. Will order at discharge     COPD  Tobacco abuse  Stable.  - Continue nicotine patch  - Patient has been declining inhalers     Thrombocytopenia   Moise of 69k 3/9, normalized 3/20. Likely due to toxicity from alcohol and consumption from bleeding given normalization.  - Monitor periodically    Diet: 2 Gram Sodium Diet  Snacks/Supplements Adult: Other; 10 am and 2pm: straw Boost  (RD); Between Meals    DVT Prophylaxis: Pneumatic Compression Devices  Leger Catheter: not present  Code Status: DNR/DNI      Disposition Plan   Expected discharge: Awaiting placement. Medically stable for discharge to treatment.   Entered: Bj Parekh MD 03/31/2020, 9:13 AM       The patient's care was discussed with the Bedside Nurse, Care Coordinator/ and Patient.      Bj Parekh MD  Hospitalist Service  [facility]    ______________________________________________________________________    Interval History   No acute events overnight. Denies any new symptoms. Abdomen feels stable from previous. Denies any chest pain or shortness of breath.     Data reviewed today: I reviewed  all medications, new labs and imaging results over the last 24 hours. I personally reviewed no images or EKG's today.    Physical Exam   Vital Signs: Temp: 97.1  F (36.2  C) Temp src: Oral BP: 118/61   Heart Rate: 52 Resp: 16 SpO2: 96 % O2 Device: None (Room air)    Weight: 178 lbs 9.6 oz    Constitutional: NAD  Respiratory: Clear to auscultation bilaterally, good air movement bilaterally  Cardiovascular: RRR. No peripheral edema.  GI: Abdomen distended, + ascites, non-tender.   Skin/Integumen: Warm, dry  Other:      Data   Recent Labs   Lab 03/30/20  0633 03/29/20  0620 03/26/20  0644   WBC 5.5 5.1  --    HGB 9.0* 8.7*  --    MCV 89 89  --     240  --     135 131*   POTASSIUM 4.4 4.4 4.3   CHLORIDE 102 104 102   CO2 25 26 23   BUN 32* 32* 32*   CR 1.45* 1.47* 1.37*   ANIONGAP 6 5 6   KEV 8.7 8.8 8.7   GLC 83 84 84       No results found for this or any previous visit (from the past 24 hour(s)).  Medications     - MEDICATION INSTRUCTIONS -       - MEDICATION INSTRUCTIONS -         DULoxetine  60 mg Oral Daily     furosemide  20 mg Oral Daily     lactulose  10 g Oral BID     mirtazapine  15 mg Oral At Bedtime     nicotine  1 patch Transdermal Daily     nicotine   Transdermal Q8H     pantoprazole  40 mg Oral QAM AC     QUEtiapine  50 mg Oral At Bedtime     sodium chloride (PF)  3 mL Intracatheter Q8H     spironolactone  25 mg Oral Daily

## 2020-03-31 NOTE — PROGRESS NOTES
DANIELE Note:    D/I:  Patient's [facility]  placed calls to several facilities to locate availability for patient to potentially be placed.  Calls were made to:    [facility]-   [facility] -[facility] -[facility] - [facility]    Omayra received calls back from [facility] and [facility] that were able to accept referrals for review.  SW sent referrals to facilities for review.    [facility] ([phone]) - Currently full but are reviewing chart for patient. Will update likely on 4/1.     [facility] [phone]) - Faxed to facility - [facility] is reviewing.  They are currently not accepting people due to Covid-19 but will discuss at the end of the week if they will accept people for next week.     [facility] - [facility] - Not accepting patients.    SW met with patient to provide an update on facilities reviewing.      P: SW will continue to assist and follow for discharge.    JULIA Navarro, LGSW  [phone]  [facility]

## 2020-03-31 NOTE — PLAN OF CARE
DATE & TIME: 3/31/2020  [id number]   Cognitive Concerns/ Orientation : A & O x 4  BEHAVIOR & AGGRESSION TOOL COLOR: Green  CIWA SCORE: N/a   ABNL VS/O2: VSS on RA ex bradycardic  MOBILITY: Independent  PAIN MANAGMENT: Denies   DIET: 2g sodium  BOWEL/BLADDER: Continent of B & B  ABNL LAB/BG: creatinine 1.45, Hgb 9.0, hematocrit 26.9  DRAIN/DEVICES: right PIV SL  TELEMETRY RHYTHM: N/a  SKIN: Bruised but intact  TESTS/PROCEDURES: N/a  D/C DAY/GOALS/PLACE: [facility]

## 2020-04-01 LAB
ANION GAP SERPL CALCULATED.3IONS-SCNC: 3 MMOL/L (ref 3–14)
BUN SERPL-MCNC: 33 MG/DL (ref 7–30)
CALCIUM SERPL-MCNC: 9.1 MG/DL (ref 8.5–10.1)
CHLORIDE SERPL-SCNC: 100 MMOL/L (ref 94–109)
CO2 SERPL-SCNC: 27 MMOL/L (ref 20–32)
CREAT SERPL-MCNC: 1.41 MG/DL (ref 0.66–1.25)
GFR SERPL CREATININE-BSD FRML MDRD: 52 ML/MIN/{1.73_M2}
GLUCOSE SERPL-MCNC: 94 MG/DL (ref 70–99)
POTASSIUM SERPL-SCNC: 4.4 MMOL/L (ref 3.4–5.3)
SODIUM SERPL-SCNC: 130 MMOL/L (ref 133–144)

## 2020-04-01 PROCEDURE — 25000132 ZZH RX MED GY IP 250 OP 250 PS 637: Mod: GY | Performed by: PSYCHIATRY & NEUROLOGY

## 2020-04-01 PROCEDURE — 25000132 ZZH RX MED GY IP 250 OP 250 PS 637: Mod: GY | Performed by: HOSPITALIST

## 2020-04-01 PROCEDURE — 99232 SBSQ HOSP IP/OBS MODERATE 35: CPT | Performed by: INTERNAL MEDICINE

## 2020-04-01 PROCEDURE — 25000132 ZZH RX MED GY IP 250 OP 250 PS 637: Mod: GY | Performed by: INTERNAL MEDICINE

## 2020-04-01 PROCEDURE — 12000000 ZZH R&B MED SURG/OB

## 2020-04-01 PROCEDURE — 99231 SBSQ HOSP IP/OBS SF/LOW 25: CPT | Performed by: PSYCHIATRY & NEUROLOGY

## 2020-04-01 PROCEDURE — 36415 COLL VENOUS BLD VENIPUNCTURE: CPT | Performed by: INTERNAL MEDICINE

## 2020-04-01 PROCEDURE — 25000132 ZZH RX MED GY IP 250 OP 250 PS 637: Mod: GY | Performed by: STUDENT IN AN ORGANIZED HEALTH CARE EDUCATION/TRAINING PROGRAM

## 2020-04-01 PROCEDURE — 80048 BASIC METABOLIC PNL TOTAL CA: CPT | Performed by: INTERNAL MEDICINE

## 2020-04-01 RX ADMIN — QUETIAPINE 50 MG: 25 TABLET ORAL at 22:10

## 2020-04-01 RX ADMIN — HYDROXYZINE HYDROCHLORIDE 25 MG: 25 TABLET, FILM COATED ORAL at 16:19

## 2020-04-01 RX ADMIN — LACTULOSE 10 G: 10 SOLUTION ORAL at 22:10

## 2020-04-01 RX ADMIN — HYDROXYZINE HYDROCHLORIDE 25 MG: 25 TABLET, FILM COATED ORAL at 22:10

## 2020-04-01 RX ADMIN — FUROSEMIDE 20 MG: 20 TABLET ORAL at 09:07

## 2020-04-01 RX ADMIN — DULOXETINE 60 MG: 60 CAPSULE, DELAYED RELEASE ORAL at 09:07

## 2020-04-01 RX ADMIN — NICOTINE 1 PATCH: 14 PATCH, EXTENDED RELEASE TRANSDERMAL at 09:07

## 2020-04-01 RX ADMIN — SPIRONOLACTONE 25 MG: 25 TABLET ORAL at 09:07

## 2020-04-01 RX ADMIN — MIRTAZAPINE 15 MG: 15 TABLET, FILM COATED ORAL at 22:10

## 2020-04-01 RX ADMIN — PANTOPRAZOLE SODIUM 40 MG: 40 TABLET, DELAYED RELEASE ORAL at 06:41

## 2020-04-01 RX ADMIN — HYDROXYZINE HYDROCHLORIDE 25 MG: 25 TABLET, FILM COATED ORAL at 09:07

## 2020-04-01 ASSESSMENT — ACTIVITIES OF DAILY LIVING (ADL)
ADLS_ACUITY_SCORE: 15

## 2020-04-01 NOTE — CONSULTS
"[facility] Psychiatric Consult Progress Note    Interval History:   Pt seen, chart reviewed, case discussed with nursing staff and treating clinicians.  I met with Jim on station 88 this morning.  He looks better to me physically, he is smiling more but admits at times he gets a little bit anxious with \"all of this virus stuff\".  They are still working on placement which is quite a challenge with the virus pandemic.  He is sleeping better with Seroquel and using hydroxyzine as needed to mitigate his anxiety.  He denies any safety concerns.     Review of systems:   10 point Review of Systems completed by Dr. Davis, and is  is negative other than noted in the HPI     Medications:       DULoxetine  60 mg Oral Daily     furosemide  20 mg Oral Daily     lactulose  10 g Oral BID     mirtazapine  15 mg Oral At Bedtime     nicotine  1 patch Transdermal Daily     nicotine   Transdermal Q8H     pantoprazole  40 mg Oral QAM AC     QUEtiapine  50 mg Oral At Bedtime     sodium chloride (PF)  3 mL Intracatheter Q8H     spironolactone  25 mg Oral Daily     acetaminophen, glucose **OR** dextrose **OR** glucagon, fluticasone-vilanterol, hydrOXYzine, lidocaine 4%, lidocaine (buffered or not buffered), - MEDICATION INSTRUCTIONS -, - MEDICATION INSTRUCTIONS -, melatonin, miconazole, naloxone, ondansetron **OR** ondansetron, sodium chloride (PF), traZODone    Mental Status Examination:     Appearance:  awake, alert, dressed in hospital scrubs, appeared older than stated age, well groomed and cooperative, seems more animated, smiling more  Eye Contact:  good  Speech:  clear, coherent  Language:Normal  Psychomotor Behavior:  no evidence of tardive dyskinesia, dystonia, or tics  Mood:  depressed, \"anxious at times but I am coping\".  Affect:  intensity is flat  Thought Process:  logical, linear and goal oriented no loose associations  Thought Content:  no evidence of suicidal ideation or homicidal ideation and no " evidence of psychotic thought, some pessimistic depressive cognitions  Oriented to:  time, person, and place  Attention Span and Concentration:  fair  Recent and Remote Memory:  intact  Fund of Knowledge: appropriate  Muscle Strength and Tone: weak  Gait and Station: Normal  Insight:  limited  Judgment:  poor        Labs/Vitals:     No results found for this or any previous visit (from the past 24 hour(s)).  B/P: 133/62, T: 98.4, P: 65, R: 15    Impression:   Jim presents with intractable alcohol dependence and multiple inpatient medical admissions related to his drinking.  He is under commitment, awaiting disposition to an appropriate program.  He looks better clinically to me, more animated, sleeping better.      1.  Alcohol use disorder, severe  2.  Major depressive disorder recurrent, moderate severity  3.  Unspecified anxiety disorder  4.  Acute alcohol intoxication and multiple medical comorbidities related to severe alcohol use         Plan:   1. Written information given on medications. Side effects, risks, benefits reviewed.  2.  Continue current psychotropic meds, adding the Seroquel back seems to have helped his sleep   3.  Awaiting disposition to inpatient chemical dependency treatment, currently committed     attestation:  Patient has been seen and evaluated by me,  Donell Davis MD

## 2020-04-01 NOTE — PROGRESS NOTES
[facility]    Medicine Progress Note - Hospitalist Service       Date of Admission:  3/7/2020  Assessment & Plan   Jim Richard is a 64 year-old male with history of alcohol dependence, alcoholic cirrhosis with history of varices, depression, anxiety, coronary artery disease, obstructive sleep apnea, peripheral vascular disease, prior subdural hematoma who presents with alcohol intoxication and melena. Admitted 3/7/2020.      Gastrointestinal bleed likely secondary to esophagitis  Melena  H/o esophageal varices  Acute on chronic blood loss anemia secondary to cirrhosis and GI bleed  Presented with one week hx of melena.  Baseline hemoglobin 9-10 g/dl recent, on admission Hgb 9.4 g/dl.  Mildly tachycardic on admission otherwise blood pressure stable. Suspected slow upper GI bleed. GI ([facility]) consulted, EGD 3/7 did not show any bleeding ulcers, noted to have grade 1 esophageal varices with no signs of bleeding. Colonoscopy 3/10 with diverticulosis, 2 polyps, congested mucosa.  - Continue pantoprazole daily, continue indefinitely   - Avoid anticoagulation if possible  - Hemoglobin stable (last checked 3/30)    Cirrhosis with ascites  Alcoholic hepatitis  Completed 5 day course of ceftriaxone IV for SBP prophylaxis in setting of GIB.   - Continue lactulose, goal of 2-3 loose stools daily  - Continue furosemide 20 mg daily, spironolactone 25 mg daily  - Paracentesis PRN (last done 3/20 with 3L removed)  - LFTs stable 3/23 (alkaline phos stably elevated). Monitor periodically.   - 2 gram sodium restricted diet     Acute kidney injury   Baseline creatinine appears to be ~0.9-1.2. elevated 3/16-17 to 1.4. BP's have been ok. No obvious nephrotoxins administered, no contrast. Possible HRS type II.  - Creatinine stable in 1.4 range 4/1. Suspected this is new baseline.   - Avoid nephrotoxins   - Monitor BMP periodically     Hyponatremia, likely secondary to cirrhosis   Sodium intermittently in low 130s.    - Sodium 130 4/1  - Monitor periodically      Depression with history of suicidal ideation  Anxiety  PTA duloxetine 60 mg daily, quetiapine 50 mg at bedtime PRN, trazodone 200 mg at bedtime, mirtazapine 15 mg at bedtime. Poor medication compliance, had not taken meds for several weeks PTA. Psych had seen previous hospitalization.   - Psychiatry has seen, greatly appreciate assistance  - Continue duloxetine, mirtazapine, PRN trazodone, quetiapine   - Hydroxyzine PRN for anxiety     Alcohol intoxication  Alcohol dependency   Drinking up to 1.75 L vodka daily. BARNEY on admission at 0.29. Does have hx of DT's, but no h/o withdrawal seizures. Treated for withdrawal on admission.   - Currently pursuing commitment, awaiting placement     JANIS on CPAP  Uses at home, declines here. Will order at discharge     COPD  Tobacco abuse  Stable.  - Continue nicotine patch  - Patient has been declining inhalers     Thrombocytopenia   Moise of 69k 3/9, normalized 3/20. Likely due to toxicity from alcohol and consumption from bleeding given normalization.  - Monitor periodically    Diet: 2 Gram Sodium Diet  Snacks/Supplements Adult: Other; 10 am and 2pm: straw Boost  (RD); Between Meals    DVT Prophylaxis: Pneumatic Compression Devices  Leger Catheter: not present  Code Status: DNR/DNI      Disposition Plan   Expected discharge: Awaiting placement. Medically stable for discharge to treatment.   Entered: Bj Parekh MD 04/01/2020, 8:34 AM       The patient's care was discussed with the Patient.      Bj Parekh MD  Hospitalist Service  [facility]    ______________________________________________________________________    Interval History   No acute events overnight. Denies any new symptoms. Abdomen feels stable from previous. Denies any chest pain or shortness of breath.     Data reviewed today: I reviewed all medications, new labs and imaging results over the last 24 hours. I personally reviewed no images or  EKG's today.    Physical Exam   Vital Signs: Temp: 98.2  F (36.8  C) Temp src: Oral BP: 108/50 Pulse: 60 Heart Rate: 52 Resp: 16 SpO2: 95 % O2 Device: None (Room air)    Weight: 181 lbs 8 oz    Constitutional: NAD  Respiratory:    Cardiovascular:  No peripheral edema.  GI: Abdomen distended, + ascites, non-tender.   Skin/Integumen: Warm, dry  Other:      Data   Recent Labs   Lab 04/01/20  0724 03/30/20  0633 03/29/20  0620   WBC  --  5.5 5.1   HGB  --  9.0* 8.7*   MCV  --  89 89   PLT  --  231 240   * 133 135   POTASSIUM 4.4 4.4 4.4   CHLORIDE 100 102 104   CO2 27 25 26   BUN 33* 32* 32*   CR 1.41* 1.45* 1.47*   ANIONGAP 3 6 5   KEV 9.1 8.7 8.8   GLC 94 83 84       No results found for this or any previous visit (from the past 24 hour(s)).  Medications     - MEDICATION INSTRUCTIONS -       - MEDICATION INSTRUCTIONS -         DULoxetine  60 mg Oral Daily     furosemide  20 mg Oral Daily     lactulose  10 g Oral BID     mirtazapine  15 mg Oral At Bedtime     nicotine  1 patch Transdermal Daily     nicotine   Transdermal Q8H     pantoprazole  40 mg Oral QAM AC     QUEtiapine  50 mg Oral At Bedtime     sodium chloride (PF)  3 mL Intracatheter Q8H     spironolactone  25 mg Oral Daily

## 2020-04-01 NOTE — PLAN OF CARE
No changes this shift. Vitals stable. A&O, atarax prn for anxiety. Ambulating independent. Good appetite. Discharge when CD placement found.

## 2020-04-01 NOTE — PROGRESS NOTES
DANIELE Note:    D/I:  SW placed call to Brad with [facility] to inquire about availability for patient.  They are still reviewing files and will update when they have reviewed.  Updated patient.  No other open facilities have been provided by [facility]  as possibilities at this time.     P: SW will remain available to assist for discharge planning.    JULIA Navarro, LGSW  [phone]  [facility]

## 2020-04-02 PROCEDURE — 25000132 ZZH RX MED GY IP 250 OP 250 PS 637: Mod: GY | Performed by: HOSPITALIST

## 2020-04-02 PROCEDURE — 12000000 ZZH R&B MED SURG/OB

## 2020-04-02 PROCEDURE — 25000132 ZZH RX MED GY IP 250 OP 250 PS 637: Mod: GY | Performed by: INTERNAL MEDICINE

## 2020-04-02 PROCEDURE — 25000132 ZZH RX MED GY IP 250 OP 250 PS 637: Mod: GY | Performed by: STUDENT IN AN ORGANIZED HEALTH CARE EDUCATION/TRAINING PROGRAM

## 2020-04-02 PROCEDURE — 25000132 ZZH RX MED GY IP 250 OP 250 PS 637: Mod: GY | Performed by: PSYCHIATRY & NEUROLOGY

## 2020-04-02 PROCEDURE — 99231 SBSQ HOSP IP/OBS SF/LOW 25: CPT | Performed by: INTERNAL MEDICINE

## 2020-04-02 RX ADMIN — HYDROXYZINE HYDROCHLORIDE 25 MG: 25 TABLET, FILM COATED ORAL at 09:15

## 2020-04-02 RX ADMIN — PANTOPRAZOLE SODIUM 40 MG: 40 TABLET, DELAYED RELEASE ORAL at 06:47

## 2020-04-02 RX ADMIN — TRAZODONE HYDROCHLORIDE 100 MG: 50 TABLET ORAL at 21:41

## 2020-04-02 RX ADMIN — NICOTINE 1 PATCH: 14 PATCH, EXTENDED RELEASE TRANSDERMAL at 09:15

## 2020-04-02 RX ADMIN — DULOXETINE 60 MG: 60 CAPSULE, DELAYED RELEASE ORAL at 09:15

## 2020-04-02 RX ADMIN — QUETIAPINE 50 MG: 25 TABLET ORAL at 21:42

## 2020-04-02 RX ADMIN — FUROSEMIDE 20 MG: 20 TABLET ORAL at 09:15

## 2020-04-02 RX ADMIN — LACTULOSE 10 G: 10 SOLUTION ORAL at 09:15

## 2020-04-02 RX ADMIN — HYDROXYZINE HYDROCHLORIDE 25 MG: 25 TABLET, FILM COATED ORAL at 15:25

## 2020-04-02 RX ADMIN — SPIRONOLACTONE 25 MG: 25 TABLET ORAL at 09:15

## 2020-04-02 RX ADMIN — HYDROXYZINE HYDROCHLORIDE 25 MG: 25 TABLET, FILM COATED ORAL at 21:43

## 2020-04-02 RX ADMIN — LACTULOSE 10 G: 10 SOLUTION ORAL at 21:41

## 2020-04-02 RX ADMIN — MIRTAZAPINE 15 MG: 15 TABLET, FILM COATED ORAL at 21:42

## 2020-04-02 ASSESSMENT — ACTIVITIES OF DAILY LIVING (ADL)
ADLS_ACUITY_SCORE: 16
ADLS_ACUITY_SCORE: 15

## 2020-04-02 NOTE — PROGRESS NOTES
[facility]    Medicine Progress Note - Hospitalist Service       Date of Admission:  3/7/2020  Assessment & Plan   Jim Richard is a 64 year-old male with history of alcohol dependence, alcoholic cirrhosis with history of varices, depression, anxiety, coronary artery disease, obstructive sleep apnea, peripheral vascular disease, prior subdural hematoma who presents with alcohol intoxication and melena. Admitted 3/7/2020.      Gastrointestinal bleed likely secondary to esophagitis  Melena  H/o esophageal varices  Acute on chronic blood loss anemia secondary to cirrhosis and GI bleed  Presented with one week hx of melena.  Baseline hemoglobin 9-10 g/dl recent, on admission Hgb 9.4 g/dl.  Mildly tachycardic on admission otherwise blood pressure stable. Suspected slow upper GI bleed. GI ([facility]) consulted, EGD 3/7 did not show any bleeding ulcers, noted to have grade 1 esophageal varices with no signs of bleeding. Colonoscopy 3/10 with diverticulosis, 2 polyps, congested mucosa.  - Continue pantoprazole daily (indefinitely)  - Avoid anticoagulation if possible  - Hemoglobin stable (last checked 3/30)    Cirrhosis with ascites  Alcoholic hepatitis  Completed 5 day course of ceftriaxone IV for SBP prophylaxis in setting of GIB.   - Continue lactulose, goal of 2-3 loose stools daily  - Continue furosemide 20 mg daily, spironolactone 25 mg daily  - Paracentesis PRN (last done 3/20 with 3L removed)  - LFTs stable 3/23 (alkaline phos stably elevated). Monitor periodically.   - 2 gram sodium restricted diet     Acute kidney injury   Baseline creatinine appears to be ~0.9-1.2. elevated 3/16-17 to 1.4. BP's have been ok. No obvious nephrotoxins administered, no contrast. Possible HRS type II.  - Creatinine stable in 1.4 range 4/1. Suspected this is new baseline.   - Avoid nephrotoxins   - Monitor BMP periodically     Hyponatremia, likely secondary to cirrhosis   Sodium intermittently in low 130s.   -  Sodium 130 4/1  - Monitor periodically, BMP 4/3 ordered     Depression with history of suicidal ideation  Anxiety  PTA duloxetine 60 mg daily, quetiapine 50 mg at bedtime PRN, trazodone 200 mg at bedtime, mirtazapine 15 mg at bedtime. Poor medication compliance, had not taken meds for several weeks PTA. Psych had seen previous hospitalization.   - Psychiatry has seen, greatly appreciate assistance  - Continue duloxetine, mirtazapine, PRN trazodone, quetiapine   - Hydroxyzine PRN for anxiety     Alcohol intoxication  Alcohol dependency   Drinking up to 1.75 L vodka daily. BARNEY on admission at 0.29. Does have hx of DT's, but no h/o withdrawal seizures. Treated for withdrawal on admission.   - Currently pursuing commitment, awaiting placement     JANIS on CPAP  Uses at home, declines here. Will order at discharge     COPD  Tobacco abuse  Stable.  - Continue nicotine patch  - Patient has been declining inhalers     Thrombocytopenia   Moise of 69k 3/9, normalized 3/20. Likely due to toxicity from alcohol and consumption from bleeding given normalization.  - Monitor periodically    Diet: 2 Gram Sodium Diet  Snacks/Supplements Adult: Other; 10 am and 2pm: straw Boost  (RD); Between Meals    DVT Prophylaxis: Pneumatic Compression Devices  Leger Catheter: not present  Code Status: DNR/DNI      Disposition Plan   Expected discharge: Awaiting placement. Medically stable for discharge to treatment.   Entered: Bj Parekh MD 04/02/2020, 10:31 AM       The patient's care was discussed with the Patient.      Bj Parekh MD  Hospitalist Service  [facility]    ______________________________________________________________________    Interval History   No acute events overnight. Denies any new symptoms. Abdomen feels stable from previous. Eating/drinking well.     Data reviewed today: I reviewed all medications, new labs and imaging results over the last 24 hours. I personally reviewed no images or EKG's  today.    Physical Exam   Vital Signs: Temp: 96.5  F (35.8  C) Temp src: Oral BP: 115/63 Pulse: 57 Heart Rate: 95 Resp: 16 SpO2: 95 % O2 Device: None (Room air)    Weight: 181 lbs 8 oz    Constitutional: NAD  Respiratory:    Cardiovascular:  No peripheral edema.  GI: Abdomen distended, + ascites, non-tender.   Skin/Integumen: Warm, dry  Other:      Data   Recent Labs   Lab 04/01/20  0724 03/30/20  0633 03/29/20  0620   WBC  --  5.5 5.1   HGB  --  9.0* 8.7*   MCV  --  89 89   PLT  --  231 240   * 133 135   POTASSIUM 4.4 4.4 4.4   CHLORIDE 100 102 104   CO2 27 25 26   BUN 33* 32* 32*   CR 1.41* 1.45* 1.47*   ANIONGAP 3 6 5   KEV 9.1 8.7 8.8   GLC 94 83 84       No results found for this or any previous visit (from the past 24 hour(s)).  Medications     - MEDICATION INSTRUCTIONS -       - MEDICATION INSTRUCTIONS -         DULoxetine  60 mg Oral Daily     furosemide  20 mg Oral Daily     lactulose  10 g Oral BID     mirtazapine  15 mg Oral At Bedtime     nicotine  1 patch Transdermal Daily     nicotine   Transdermal Q8H     pantoprazole  40 mg Oral QAM AC     QUEtiapine  50 mg Oral At Bedtime     sodium chloride (PF)  3 mL Intracatheter Q8H     spironolactone  25 mg Oral Daily

## 2020-04-02 NOTE — PLAN OF CARE
A/O x4, anxious at times, atarax given x1.  AVSS on RA. Up independently, ambulated muniz x2. 2gr NA+ diet, good appetite. Discharge pending placement, will continue to monitor.

## 2020-04-02 NOTE — PLAN OF CARE
A&Ox4. VSS on RA. Denies pain/nausea. PRN atarax for anxiety. Up independently, ambulated halls throughout day. 2 gram NA diet, good appetite. Abdomen distended. Discharge pending placement, Psych following.

## 2020-04-02 NOTE — PROGRESS NOTES
DANIELE Note:    D/I:  SW received email from patient's  Omayra that [facility] in [place] has some openings and would be willing to review patient for potential placement.  SW faxed referral to Dahlia at [phone].      SW placed call to Eva at [facility] to see if they are going to be taking patient's next week and she informed that they haven't heard from their corporate yet about admissions.  They will call back when they know or else SW can call tomorrow for update.    P: SW will continue to assist and follow for discharge.    JULIA Navarro, LGSW  [phone]  [facility]

## 2020-04-02 NOTE — PLAN OF CARE
Pt A&Ox4. VSS. Denies pain. PRN atarax given. Up ind. 2 gram Na diet. Slept most of shift. Discharge pending placement. Psych following.

## 2020-04-03 LAB
ANION GAP SERPL CALCULATED.3IONS-SCNC: 3 MMOL/L (ref 3–14)
BUN SERPL-MCNC: 34 MG/DL (ref 7–30)
CALCIUM SERPL-MCNC: 9.2 MG/DL (ref 8.5–10.1)
CHLORIDE SERPL-SCNC: 103 MMOL/L (ref 94–109)
CO2 SERPL-SCNC: 27 MMOL/L (ref 20–32)
CREAT SERPL-MCNC: 1.39 MG/DL (ref 0.66–1.25)
GFR SERPL CREATININE-BSD FRML MDRD: 52 ML/MIN/{1.73_M2}
GLUCOSE SERPL-MCNC: 83 MG/DL (ref 70–99)
POTASSIUM SERPL-SCNC: 4.2 MMOL/L (ref 3.4–5.3)
SODIUM SERPL-SCNC: 133 MMOL/L (ref 133–144)

## 2020-04-03 PROCEDURE — 25000132 ZZH RX MED GY IP 250 OP 250 PS 637: Mod: GY | Performed by: INTERNAL MEDICINE

## 2020-04-03 PROCEDURE — 25000132 ZZH RX MED GY IP 250 OP 250 PS 637: Mod: GY | Performed by: STUDENT IN AN ORGANIZED HEALTH CARE EDUCATION/TRAINING PROGRAM

## 2020-04-03 PROCEDURE — 99232 SBSQ HOSP IP/OBS MODERATE 35: CPT | Performed by: INTERNAL MEDICINE

## 2020-04-03 PROCEDURE — 36415 COLL VENOUS BLD VENIPUNCTURE: CPT | Performed by: INTERNAL MEDICINE

## 2020-04-03 PROCEDURE — 80048 BASIC METABOLIC PNL TOTAL CA: CPT | Performed by: INTERNAL MEDICINE

## 2020-04-03 PROCEDURE — 25000132 ZZH RX MED GY IP 250 OP 250 PS 637: Mod: GY | Performed by: HOSPITALIST

## 2020-04-03 PROCEDURE — 12000000 ZZH R&B MED SURG/OB

## 2020-04-03 PROCEDURE — 25000132 ZZH RX MED GY IP 250 OP 250 PS 637: Mod: GY | Performed by: PSYCHIATRY & NEUROLOGY

## 2020-04-03 RX ORDER — TAMSULOSIN HYDROCHLORIDE 0.4 MG/1
0.4 CAPSULE ORAL EVERY EVENING
Status: DISCONTINUED | OUTPATIENT
Start: 2020-04-03 | End: 2020-04-09 | Stop reason: HOSPADM

## 2020-04-03 RX ORDER — TAMSULOSIN HYDROCHLORIDE 0.4 MG/1
0.4 CAPSULE ORAL DAILY
Status: DISCONTINUED | OUTPATIENT
Start: 2020-04-03 | End: 2020-04-03

## 2020-04-03 RX ADMIN — LACTULOSE 10 G: 10 SOLUTION ORAL at 08:01

## 2020-04-03 RX ADMIN — SPIRONOLACTONE 25 MG: 25 TABLET ORAL at 08:01

## 2020-04-03 RX ADMIN — PANTOPRAZOLE SODIUM 40 MG: 40 TABLET, DELAYED RELEASE ORAL at 06:45

## 2020-04-03 RX ADMIN — HYDROXYZINE HYDROCHLORIDE 25 MG: 25 TABLET, FILM COATED ORAL at 14:43

## 2020-04-03 RX ADMIN — HYDROXYZINE HYDROCHLORIDE 25 MG: 25 TABLET, FILM COATED ORAL at 08:13

## 2020-04-03 RX ADMIN — LACTULOSE 10 G: 10 SOLUTION ORAL at 20:33

## 2020-04-03 RX ADMIN — FUROSEMIDE 20 MG: 20 TABLET ORAL at 08:01

## 2020-04-03 RX ADMIN — NICOTINE 1 PATCH: 14 PATCH, EXTENDED RELEASE TRANSDERMAL at 08:14

## 2020-04-03 RX ADMIN — DULOXETINE 60 MG: 60 CAPSULE, DELAYED RELEASE ORAL at 08:01

## 2020-04-03 RX ADMIN — TRAZODONE HYDROCHLORIDE 100 MG: 50 TABLET ORAL at 21:43

## 2020-04-03 RX ADMIN — TAMSULOSIN HYDROCHLORIDE 0.4 MG: 0.4 CAPSULE ORAL at 20:33

## 2020-04-03 RX ADMIN — QUETIAPINE 50 MG: 25 TABLET ORAL at 21:40

## 2020-04-03 RX ADMIN — MIRTAZAPINE 15 MG: 15 TABLET, FILM COATED ORAL at 21:40

## 2020-04-03 RX ADMIN — HYDROXYZINE HYDROCHLORIDE 25 MG: 25 TABLET, FILM COATED ORAL at 21:43

## 2020-04-03 ASSESSMENT — ACTIVITIES OF DAILY LIVING (ADL)
ADLS_ACUITY_SCORE: 15
ADLS_ACUITY_SCORE: 11
ADLS_ACUITY_SCORE: 11
ADLS_ACUITY_SCORE: 15

## 2020-04-03 NOTE — PROGRESS NOTES
[facility]    Medicine Progress Note - Hospitalist Service       Date of Admission:  3/7/2020  Assessment & Plan   Jim Richard is a 64 year-old male with history of alcohol dependence, alcoholic cirrhosis with history of varices, depression, anxiety, coronary artery disease, obstructive sleep apnea, peripheral vascular disease, prior subdural hematoma who presents with alcohol intoxication and melena. Admitted 3/7/2020.      Gastrointestinal bleed likely secondary to esophagitis  Melena  H/o esophageal varices  Acute on chronic blood loss anemia secondary to cirrhosis and GI bleed  Presented with one week hx of melena.  Baseline hemoglobin 9-10 g/dl recent, on admission Hgb 9.4 g/dl.  Mildly tachycardic on admission otherwise blood pressure stable. Suspected slow upper GI bleed. GI ([facility]) consulted, EGD 3/7 did not show any bleeding ulcers, noted to have grade 1 esophageal varices with no signs of bleeding. Colonoscopy 3/10 with diverticulosis, 2 polyps, congested mucosa.  - Continue pantoprazole daily (indefinitely)  - Avoid anticoagulation if possible  - Hemoglobin stable (last checked 3/30)    Cirrhosis with ascites  Alcoholic hepatitis  Completed 5 day course of ceftriaxone IV for SBP prophylaxis in setting of GIB.   - Continue lactulose, goal of 2-3 loose stools daily  - Continue furosemide 20 mg daily, spironolactone 25 mg daily  - Paracentesis PRN (last done 3/20 with 3L removed)  - LFTs stable 3/23 (alkaline phos stably elevated). Monitor periodically.   - 2 gram sodium restricted diet     Acute kidney injury   Baseline creatinine appears to be ~0.9-1.2. elevated 3/16-17 to 1.4. BP's have been ok. No obvious nephrotoxins administered, no contrast. Possible HRS type II.  - Creatinine stable around 1.4. Suspected this is new baseline.   - Avoid nephrotoxins   - Monitor BMP periodically     Lower urinary tract symptoms  Reports weak stream, nocturia, hesitancy. No documented history of  BPH, though has previously been on tamsulosin per records.   - Tamsulosin ordered  - Monitor symptoms    Hyponatremia, likely secondary to cirrhosis   Sodium intermittently in low 130s.   - Sodium 133 4/3  - Monitor periodically, has been consistently in low 130s on current regimen so can reduce frequency of monitoring      Depression with history of suicidal ideation  Anxiety  PTA duloxetine 60 mg daily, quetiapine 50 mg at bedtime PRN, trazodone 200 mg at bedtime, mirtazapine 15 mg at bedtime. Poor medication compliance, had not taken meds for several weeks PTA. Psych had seen previous hospitalization.   - Psychiatry has seen, greatly appreciate assistance  - Continue duloxetine, mirtazapine, PRN trazodone, quetiapine   - Hydroxyzine PRN for anxiety     Alcohol intoxication  Alcohol dependency   Drinking up to 1.75 L vodka daily. BARNEY on admission at 0.29. Does have hx of DT's, but no h/o withdrawal seizures. Treated for withdrawal on admission.   - Currently pursuing commitment, awaiting placement     JANIS on CPAP  Uses at home, declines here. Will order at discharge     COPD  Tobacco abuse  Stable.  - Continue nicotine patch  - Patient has been declining inhalers     Thrombocytopenia   Moise of 69k 3/9, normalized 3/20. Likely due to toxicity from alcohol and consumption from bleeding given normalization.  - Monitor periodically    Diet: 2 Gram Sodium Diet  Snacks/Supplements Adult: Other; 10 am and 2pm: straw Boost  (RD); Between Meals    DVT Prophylaxis: Pneumatic Compression Devices  Leger Catheter: not present  Code Status: DNR/DNI      Disposition Plan   Expected discharge: Awaiting placement. Medically stable for discharge to treatment.   Entered: Bj Parekh MD 04/03/2020, 9:51 AM       The patient's care was discussed with the Patient.      Bj Parekh MD  Hospitalist Service  [facility]    ______________________________________________________________________    Interval History    No acute events overnight. Reports weak stream that seems to be worsening. Also with chronic nocturia x3, periods of hesitancy. Denies any other new symptoms. Abdomen stable. Has been ambulating unit.     Data reviewed today: I reviewed all medications, new labs and imaging results over the last 24 hours. I personally reviewed no images or EKG's today.    Physical Exam   Vital Signs: Temp: 98.1  F (36.7  C) Temp src: Oral BP: 125/64   Heart Rate: 54 Resp: 16 SpO2: 96 % O2 Device: None (Room air)    Weight: 180 lbs 4.8 oz    Constitutional: NAD  Respiratory:    Cardiovascular:  No peripheral edema.  GI: Abdomen distended, + ascites, non-tender.   Skin/Integumen: Warm, dry  Other:      Data   Recent Labs   Lab 04/03/20  0636 04/01/20  0724 03/30/20  0633 03/29/20  0620   WBC  --   --  5.5 5.1   HGB  --   --  9.0* 8.7*   MCV  --   --  89 89   PLT  --   --  231 240    130* 133 135   POTASSIUM 4.2 4.4 4.4 4.4   CHLORIDE 103 100 102 104   CO2 27 27 25 26   BUN 34* 33* 32* 32*   CR 1.39* 1.41* 1.45* 1.47*   ANIONGAP 3 3 6 5   KEV 9.2 9.1 8.7 8.8   GLC 83 94 83 84       No results found for this or any previous visit (from the past 24 hour(s)).  Medications     - MEDICATION INSTRUCTIONS -       - MEDICATION INSTRUCTIONS -         DULoxetine  60 mg Oral Daily     furosemide  20 mg Oral Daily     lactulose  10 g Oral BID     mirtazapine  15 mg Oral At Bedtime     nicotine  1 patch Transdermal Daily     nicotine   Transdermal Q8H     pantoprazole  40 mg Oral QAM AC     QUEtiapine  50 mg Oral At Bedtime     sodium chloride (PF)  3 mL Intracatheter Q8H     spironolactone  25 mg Oral Daily     tamsulosin  0.4 mg Oral QPM

## 2020-04-03 NOTE — PLAN OF CARE
A&Ox4.  VSS, RA.  Denies pain.  PRN Atarax given Q 6 hours for anxiety. Independent; ambulated multiple times throughout the shift.  Tolerating 2 gram Na diet without problems.  Discharge pending placement.

## 2020-04-03 NOTE — PLAN OF CARE
A&O x4. Cooperative. VSS on RA. Denies pain. LS clear. BS present. Up independently. Discharge to treatment facility once placement found.

## 2020-04-03 NOTE — PLAN OF CARE
Aox4, VSS. Denies pain, c/o of anxiousness, PRN Atarax given x2. Up independently, ambulating in halls frequently. Discharge to treatment facility once placement found.

## 2020-04-03 NOTE — PROGRESS NOTES
DANIELE Note:    D/I:  DANIELE is following for discharge planning.  DANIELE spoke with Sara with [facility] to inquire if they had reviewed patient's referral yet for admission.  Sara stated that she couldn't find his referral and requested that DANIELE fax over again.  DANIELE faxed over referral to Sara for review. DANIELE received call from Sara who stated that they would be able to accept patient at their facility, but will not have a bed for patient until likely next week. Requesting Court Document for commitment and Rule 25 assessment update. DANIELE notified LADC at the hospital for updated assessment.  Requested that MD put in order for her to see patient for assessment.  Paged Physician.  Updated patient that CD counselor would likely be coming to meet with him to complete assessment. Patient in agreement.     DANIELE spoke with Pricilla at [facility] and their counselor Vadim ([phone]) will review on Monday and will update DANIELE.  They are now taking three people per week to their facility.  DANIELE met with patient and discussed potential openings for facilities.  Patient prefers [facility] if both are able to accept. Updated  Omayra.    P: DANIELE will continue to assist and follow for discharge.    JULIA Navarro, LGSW  [phone]  [facility]

## 2020-04-04 PROCEDURE — 25000132 ZZH RX MED GY IP 250 OP 250 PS 637: Mod: GY | Performed by: INTERNAL MEDICINE

## 2020-04-04 PROCEDURE — 25000132 ZZH RX MED GY IP 250 OP 250 PS 637: Mod: GY | Performed by: HOSPITALIST

## 2020-04-04 PROCEDURE — 25000132 ZZH RX MED GY IP 250 OP 250 PS 637: Mod: GY | Performed by: PSYCHIATRY & NEUROLOGY

## 2020-04-04 PROCEDURE — 99231 SBSQ HOSP IP/OBS SF/LOW 25: CPT | Performed by: INTERNAL MEDICINE

## 2020-04-04 PROCEDURE — 25000132 ZZH RX MED GY IP 250 OP 250 PS 637: Mod: GY | Performed by: STUDENT IN AN ORGANIZED HEALTH CARE EDUCATION/TRAINING PROGRAM

## 2020-04-04 PROCEDURE — H2035 A/D TX PROGRAM, PER HOUR: HCPCS

## 2020-04-04 PROCEDURE — 12000000 ZZH R&B MED SURG/OB

## 2020-04-04 RX ADMIN — SPIRONOLACTONE 25 MG: 25 TABLET ORAL at 08:10

## 2020-04-04 RX ADMIN — LACTULOSE 10 G: 10 SOLUTION ORAL at 08:10

## 2020-04-04 RX ADMIN — QUETIAPINE 50 MG: 25 TABLET ORAL at 21:50

## 2020-04-04 RX ADMIN — TAMSULOSIN HYDROCHLORIDE 0.4 MG: 0.4 CAPSULE ORAL at 20:33

## 2020-04-04 RX ADMIN — HYDROXYZINE HYDROCHLORIDE 25 MG: 25 TABLET, FILM COATED ORAL at 09:29

## 2020-04-04 RX ADMIN — HYDROXYZINE HYDROCHLORIDE 25 MG: 25 TABLET, FILM COATED ORAL at 15:30

## 2020-04-04 RX ADMIN — PANTOPRAZOLE SODIUM 40 MG: 40 TABLET, DELAYED RELEASE ORAL at 07:08

## 2020-04-04 RX ADMIN — FUROSEMIDE 20 MG: 20 TABLET ORAL at 08:10

## 2020-04-04 RX ADMIN — LACTULOSE 10 G: 10 SOLUTION ORAL at 20:33

## 2020-04-04 RX ADMIN — TRAZODONE HYDROCHLORIDE 100 MG: 50 TABLET ORAL at 21:50

## 2020-04-04 RX ADMIN — MIRTAZAPINE 15 MG: 15 TABLET, FILM COATED ORAL at 21:51

## 2020-04-04 RX ADMIN — HYDROXYZINE HYDROCHLORIDE 25 MG: 25 TABLET, FILM COATED ORAL at 21:51

## 2020-04-04 RX ADMIN — DULOXETINE 60 MG: 60 CAPSULE, DELAYED RELEASE ORAL at 08:10

## 2020-04-04 RX ADMIN — NICOTINE 1 PATCH: 14 PATCH, EXTENDED RELEASE TRANSDERMAL at 08:14

## 2020-04-04 ASSESSMENT — ACTIVITIES OF DAILY LIVING (ADL)
ADLS_ACUITY_SCORE: 11

## 2020-04-04 NOTE — CONSULTS
Type of service:  Chemical Dependency Consult  Time Service Began: 10:27am  Time Service Ended:  10:39am  Originating Location (pt. Location):  [facility] - [address], [place] [address]    Distant Location (provider location):  [facility] [place]   Reason for Televisit:  Provider off-site, patient inpatient, alert and oriented and medically stable, appropriate for telemedicine visit.  Mode of Communication:  Video Conference via [employer]  As the provider I attest to compliance with applicable laws and regulations related to telemedicine.  SYLVIE Hammer    4/4/20 Chem dep consult completed.     Met with patient, he was agreeable to telemedicine visit.  Introduced self and role. Pt is familiar with the writer from previous consults. The patient completed an updated CD assessment. The writer will finish the CD update and will communicate and work with the unit social workers to send referrals to [facility] as per chart review programs stated they would be able to accept patient into their residential program.    Update 12:03pm:  Completed CD update. Routed to Dr. Parekh for co-sign as that is needed due to Medicare requirements. Co-sign needed for consult note and CD update assessment progress note. E-mailed unit social workers notifying them CD update is done and referrals can be sent out to programs listed above.     AMADO Hammer LADC

## 2020-04-04 NOTE — PLAN OF CARE
A&Ox4.  VSS, RA.  Denies pain.  Independent.  Nicotine patch to L shoulder.  PIV SL.  Tolerating 2 gram Na diet without problem.  Discharge pending placement.

## 2020-04-04 NOTE — PLAN OF CARE
A&Ox4, VSS ex HTN. Denies pain/nausea. PRN atarax for anxiety. Up independently, ambulating halls frequently this shift. C/o hesitancy with urination. Discharge pending placement.

## 2020-04-04 NOTE — PROGRESS NOTES
[facility]    Medicine Progress Note - Hospitalist Service       Date of Admission:  3/7/2020  Assessment & Plan   Jim Richard is a 64 year-old male with history of alcohol dependence, alcoholic cirrhosis with history of varices, depression, anxiety, coronary artery disease, obstructive sleep apnea, peripheral vascular disease, prior subdural hematoma who presents with alcohol intoxication and melena. Admitted 3/7/2020.      Gastrointestinal bleed likely secondary to esophagitis  Melena  H/o esophageal varices  Acute on chronic blood loss anemia secondary to cirrhosis and GI bleed  Presented with one week hx of melena.  Baseline hemoglobin 9-10 g/dl recent, on admission Hgb 9.4 g/dl.  Mildly tachycardic on admission otherwise blood pressure stable. Suspected slow upper GI bleed. GI ([facility]) consulted, EGD 3/7 did not show any bleeding ulcers, noted to have grade 1 esophageal varices with no signs of bleeding. Colonoscopy 3/10 with diverticulosis, 2 polyps, congested mucosa.  - Continue pantoprazole daily (indefinitely)  - Avoid anticoagulation if possible  - Hemoglobin stable (last checked 3/30)    Cirrhosis with ascites  Alcoholic hepatitis  Completed 5 day course of ceftriaxone IV for SBP prophylaxis in setting of GIB.   - Continue lactulose, goal of 2-3 loose stools daily  - Continue furosemide 20 mg daily, spironolactone 25 mg daily  - Paracentesis PRN (last done 3/20 with 3L removed)  - LFTs stable 3/23 (alkaline phos stably elevated). Monitor periodically.   - 2 gram sodium restricted diet     Acute kidney injury   Baseline creatinine appears to be ~0.9-1.2. elevated 3/16-17 to 1.4. BP's have been ok. No obvious nephrotoxins administered, no contrast. Possible HRS type II.  - Creatinine stable around 1.4 (4/3). Suspected this is new baseline.   - Avoid nephrotoxins   - Monitor BMP periodically     Lower urinary tract symptoms  Reports weak stream, nocturia, hesitancy. No documented  history of BPH, though has previously been on tamsulosin per records.   - Continue tamsulosin (started 4/3)   - Monitor symptoms    Hyponatremia, likely secondary to cirrhosis   Sodium intermittently in low 130s. Last sodium 133 (4/3)  - Monitor periodically, has been consistently in low 130s on current regimen so can reduce frequency of monitoring      Depression with history of suicidal ideation  Anxiety  PTA duloxetine 60 mg daily, quetiapine 50 mg at bedtime PRN, trazodone 200 mg at bedtime, mirtazapine 15 mg at bedtime. Poor medication compliance, had not taken meds for several weeks PTA. Psych had seen previous hospitalization.   - Psychiatry has seen, greatly appreciate assistance  - Continue duloxetine, mirtazapine, PRN trazodone, quetiapine   - Hydroxyzine PRN for anxiety     Alcohol intoxication  Alcohol dependency   Drinking up to 1.75 L vodka daily. BARNEY on admission at 0.29. Does have hx of DT's, but no h/o withdrawal seizures. Treated for withdrawal on admission.   - Currently pursuing commitment, awaiting placement     JANIS on CPAP  Uses at home, declines here. Will order at discharge     COPD  Tobacco abuse  Stable.  - Continue nicotine patch  - Patient has been declining inhalers     Thrombocytopenia   Moise of 69k 3/9, normalized 3/20. Likely due to toxicity from alcohol and consumption from bleeding given normalization.  - Monitor periodically    Diet: 2 Gram Sodium Diet  Snacks/Supplements Adult: Other; 10 am and 2pm: straw Boost  (RD); Between Meals    DVT Prophylaxis: Pneumatic Compression Devices  Leger Catheter: not present  Code Status: DNR/DNI      Disposition Plan   Expected discharge: Awaiting placement. Medically stable for discharge to treatment.   Entered: Bj Parekh MD 04/04/2020, 2:30 PM       The patient's care was discussed with the Patient.      Bj Parekh MD  Hospitalist Service  [facility]  Hospital    ______________________________________________________________________    Interval History   No acute events overnight. Reports brief 1-2 minutes of left sided abdominal pain which self-resolved and hasn't recurred. Otherwise denies new symptoms. Urinary symptoms have improved some since starting tamsulosin.     Data reviewed today: I reviewed all medications, new labs and imaging results over the last 24 hours. I personally reviewed no images or EKG's today.    Physical Exam   Vital Signs: Temp: 97.9  F (36.6  C) Temp src: Oral BP: 125/67 Pulse: 62 Heart Rate: 55 Resp: 16 SpO2: 97 % O2 Device: None (Room air)    Weight: 174 lbs 4.8 oz    Constitutional: NAD  Respiratory: Breathing appears comfortable at rest   Cardiovascular:  No peripheral edema.  GI: Abdomen distended, + ascites, non-tender.   Skin/Integumen: Warm, dry  Other:      Data   Recent Labs   Lab 04/03/20  0636 04/01/20  0724 03/30/20  0633 03/29/20  0620   WBC  --   --  5.5 5.1   HGB  --   --  9.0* 8.7*   MCV  --   --  89 89   PLT  --   --  231 240    130* 133 135   POTASSIUM 4.2 4.4 4.4 4.4   CHLORIDE 103 100 102 104   CO2 27 27 25 26   BUN 34* 33* 32* 32*   CR 1.39* 1.41* 1.45* 1.47*   ANIONGAP 3 3 6 5   KEV 9.2 9.1 8.7 8.8   GLC 83 94 83 84       No results found for this or any previous visit (from the past 24 hour(s)).  Medications     - MEDICATION INSTRUCTIONS -       - MEDICATION INSTRUCTIONS -         DULoxetine  60 mg Oral Daily     furosemide  20 mg Oral Daily     lactulose  10 g Oral BID     mirtazapine  15 mg Oral At Bedtime     nicotine  1 patch Transdermal Daily     nicotine   Transdermal Q8H     pantoprazole  40 mg Oral QAM AC     QUEtiapine  50 mg Oral At Bedtime     sodium chloride (PF)  3 mL Intracatheter Q8H     spironolactone  25 mg Oral Daily     tamsulosin  0.4 mg Oral QPM

## 2020-04-04 NOTE — PLAN OF CARE
Aox4, VSS on RA. C/o of sharp pain to left abdomen that comes and goes, denies pain meds. Up independently, ambulating frequently in halls. Nicotine patch to R shoulder. PIV SL. Good appetite, tolerating 2 gm. Discharge pending placement.

## 2020-04-04 NOTE — PROGRESS NOTES
"Type of service:  Comprehensive Assessment - Update  Time Service Began: 10:27am  Time Service Ended:  10:39am  Originating Location (pt. Location):  [facility] - [address], [place] [address]    Distant Location (provider location):  [facility] [place]   Reason for Televisit:  Provider off-site, patient inpatient, alert and oriented and medically stable, appropriate for telemedicine visit.  Mode of Communication:  Video Conference via [employer]  As the provider I attest to compliance with applicable laws and regulations related to telemedicine.  SYLVIE Hammer      Assessment and Placement Summary Update     Patient name:   Jim Richard   Patient phone: [phone] (home) none (work)   Last #:   [id number]   : 1955      PMI #: This patient does not have a PMI number.   Patient address:   [address] [place] [id number]     Date of Original Assessment / Last Update: Last update on 20  Original-10/27/19 Update Assessment Date: 20   Updated by:   SYLVIE Hoffman    phone number: [phone]   Referred by:   [facility] / phone number: [phone]   Referral to:   [facility] or [facility]    NPI: NPI unknown   Summary:  This patient had a Rule 25 Assessment on 10/27/19 at [facility] completed by AMADO Hammer LADC and a GADIEL Update on 20 completed by SYLVIE Robbins.  INSIDE: The patient's Rule 25 Assessment completed on 10/27/19 and GADIEL Update completed on 20 by SYLVIE Robbins is in the patient's electronic medical record in Epic in the Chart Review section under the Notes/Trans Tab and below.      Reason for today's update: The history was obtained from reviewing medical records and staff ordered cd consult. Per EHR progress note on 4/3/20:  \"[facility]  Medicine Progress Note - Hospitalist Service  Date of Admission:  " "3/7/2020  Assessment & Plan  Jim Richard is a 64 year-old male with history of alcohol dependence, alcoholic cirrhosis with history of varices, depression, anxiety, coronary artery disease, obstructive sleep apnea, peripheral vascular disease, prior subdural hematoma who presents with alcohol intoxication and melena. Admitted 3/7/2020\".        Substance Use History Update:           X = Primary Drug Used   Age of First Use Most Recent Pattern of Use and Duration   Need enough information to show pattern (both frequency and amounts) and to show tolerance for each chemical that has a diagnosis   Date of last use and time, if needed   Withdrawal Potential? Requiring special care Method of use  (oral, smoked, snort, IV, etc)   x   Alcohol     15   Per patient: 750ml of vodka daily.    Per EHR H&P note on 3/7/20:  Etoh level on admission at 0.29. Does have hx of DT's, but no h/o withdrawal seizures.      Per EHR CD update on 20:1-1.5pints/day 3/7/20 Yes-currently at [facility] oral      Marijuana/  Hashish   17 none 30+ years ago no smoke      Cocaine/Crack     20's None 20's no snort      Meth/  Amphetamines   No use          Heroin     No use          Other Opiates/  Synthetics   No use          Inhalants     No use          Benzodiazepines     No use          Hallucinogens     No use          Barbiturates/  Sedatives/  Hypnotics No use          Over-the-Counter Drugs   No use          Other     No use          Nicotine     15 Per patient: patient confirmed pack per day nicotine.     Per EHR CD update on 20:Pack/day     3/7/20 no smoke     Dimension: Severity Rating/ Reason for Changes from Previous Assessment:  Dimension I: Acute intoxication/Withdrawal potential     Previous ratin Current ratin   Comments:   Pt discharged from [facility] and re-admitted related to alcohol use on 3/7/20.Per EHR H&P note on 3/7/20:  Etoh level on admission at 0.29. Does have hx of DT's, but no h/o " "withdrawal seizures.  Pt did not follow through on 20 CD assessment update recommendations of inpatient treatment. Per EHR 3/25/20 psychiatric consult note:\"He is under commitment, awaiting disposition to an appropriate program\".  See commitment information in last section of update.       Per EHR discharge summary note on 20:  [facility]  Discharge Summary  Hospitalist  Date of Admission:  2/3/2020  Date of Discharge:  2020    Per EHR CD update on 20:  Patient had a BAL of 0.39 on admission.  He did require withdrawal protocols.  This day was alert and oriented.     Dimension II: Biomedical Conditions and Complications     Previous ratin Current ratin   Comments:   Per EHR hospitalist progress note on 20:  Gastrointestinal bleed likely secondary to esophagitis  Melena  H/o esophageal varices  Acute on chronic blood loss anemia secondary to cirrhosis and GI bleed  Presented with one week hx of melena.  Baseline hemoglobin 9-10 g/dl recent, on admission Hgb 9.4 g/dl.  Mildly tachycardic on admission otherwise blood pressure stable. Suspected slow upper GI bleed. GI ([facility]) consulted, EGD 3/7 did not show any bleeding ulcers, noted to have grade 1 esophageal varices with no signs of bleeding. Colonoscopy 3/10 with diverticulosis, 2 polyps, congested mucosa.  - Continue pantoprazole daily (indefinitely)  - Avoid anticoagulation if possible  - Hemoglobin stable (last checked 3/30)  Cirrhosis with ascites  Alcoholic hepatitis  Completed 5 day course of ceftriaxone IV for SBP prophylaxis in setting of GIB.   - Continue lactulose, goal of 2-3 loose stools daily  - Continue furosemide 20 mg daily, spironolactone 25 mg daily  - Paracentesis PRN (last done 3/20 with 3L removed)  - LFTs stable 3/23 (alkaline phos stably elevated). Monitor periodically.   - 2 gram sodium restricted diet  Acute kidney injury   Baseline creatinine appears to be ~0.9-1.2. elevated 3/16- to " 1.4. BP's have been ok. No obvious nephrotoxins administered, no contrast. Possible HRS type II.  - Creatinine stable in 1.4 range 4/1. Suspected this is new baseline.   - Avoid nephrotoxins   - Monitor BMP periodically   Hyponatremia, likely secondary to cirrhosis   Sodium intermittently in low 130s.   - Sodium 130 4/1  - Monitor periodically, BMP 4/3 ordered   Depression with history of suicidal ideation  Anxiety  PTA duloxetine 60 mg daily, quetiapine 50 mg at bedtime PRN, trazodone 200 mg at bedtime, mirtazapine 15 mg at bedtime. Poor medication compliance, had not taken meds for several weeks PTA. Psych had seen previous hospitalization.   - Psychiatry has seen, greatly appreciate assistance  - Continue duloxetine, mirtazapine, PRN trazodone, quetiapine   - Hydroxyzine PRN for anxiety  Alcohol intoxication  Alcohol dependency   Drinking up to 1.75 L vodka daily. BARNEY on admission at 0.29. Does have hx of DT's, but no h/o withdrawal seizures. Treated for withdrawal on admission.   - Current commitment, awaiting placement   JANIS on CPAP  Uses at home, declines here. Will order at discharge   COPD  Tobacco abuse  Stable.  - Continue nicotine patch  - Patient has been declining inhalers   Thrombocytopenia   Moise of 69k 3/9, normalized 3/20. Likely due to toxicity from alcohol and consumption from bleeding given normalization.  - Monitor periodically    Per EHR current prescribed medications during admission:  Current Facility-Administered Medications   Medication     acetaminophen (TYLENOL) tablet 650 mg     glucose gel 15-30 g    Or     dextrose 50 % injection 25-50 mL    Or     glucagon injection 1 mg     DULoxetine (CYMBALTA) DR capsule 60 mg     fluticasone-vilanterol (BREO ELLIPTA) 200-25 MCG/INH inhaler 1 puff     furosemide (LASIX) tablet 20 mg     hydrOXYzine (ATARAX) tablet 25-50 mg     lactulose (CHRONULAC) solution 10 g     lidocaine (LMX4) cream     lidocaine 1 % 0.1-1 mL     May continue current IV  "fluids if patient has IV fluids infusing.     May take regular AM medications except those listed below.     melatonin tablet 1 mg     miconazole (MICATIN) 2 % powder     mirtazapine (REMERON) tablet TABS 15 mg     naloxone (NARCAN) injection 0.1-0.4 mg     nicotine (NICODERM CQ) 14 MG/24HR 24 hr patch 1 patch     nicotine Patch in Place     ondansetron (ZOFRAN-ODT) ODT tab 4 mg    Or     ondansetron (ZOFRAN) injection 4 mg     pantoprazole (PROTONIX) EC tablet 40 mg     QUEtiapine (SEROquel) tablet 50 mg     sodium chloride (PF) 0.9% PF flush 3 mL     sodium chloride (PF) 0.9% PF flush 3 mL     spironolactone (ALDACTONE) tablet 25 mg     tamsulosin (FLOMAX) capsule 0.4 mg     traZODone (DESYREL) tablet 100 mg     Per EHR pharmacy note on 3/7/20:  Pharmacy Medication History  Admission medication history interview status for the 3/7/2020  admission is complete. See EPIC admission navigator for prior to admission medications   Medication history sources: Patient and Surescripts  Medication history source reliability: Poor, patient states that he has not been taking his medications for a long time \"since he was last here\". List represents what he is supposed to be on to the best of his recollection.   Adherence assessment: Poor  Significant changes made to the medication list:  Added: none  Changed: lactulose (dose)  Removed: propranolol (HR too low), pantoprazole, tamsulosin        Additional medication history information:   - States he has not been taking his medications \"since he left here\" with the exception of his furosemide.   - States at the end of interview that he probably should take his medicaitons and that he may feel better.    Per EHR CD update on 2/13/20:  See physician H&P for full medical history and current medications   Administered.  Patient does not have any health issues that would interfere with his ability to participate in treatment programming.     Dimension III: " "Emotional/Behavioral/Cognitive     Previous ratin Current ratin   Comments:   Per EHR psychiatric consult note on 20:  Diagnosis:  1.  Alcohol use disorder, severe  2.  Major depressive disorder recurrent, moderate severity  3.  Unspecified anxiety disorder  4.  Acute alcohol intoxication and multiple medical comorbidities related to severe alcohol use    See dimension 2 for medication information prior to admission and currently. Per EHR pt was discharged from the hospital on 20 and readmitted on 3/7/20. Pt denied current SI. Pt denied having MH services in place prior to readmission. Pt reported he has memory impairments and cognitive deficits.     Per EHR CD update on 20:  Patient reports depression and anxiety, and no current mental health providers.  He denies any suicidal ideation. He reports anxiety is the reason he struggles with establishing and maintaining abstinence.  Patient was seen by psychiatry, please refer to psychiatrist notes for further mental health assessment information.     Dimension IV: Readiness for Change     Previous ratin Current ratin   Comments:   Per patient:The things that I have done to my body and medical issues related to it. Pain before I came in. I want to get into inpatient treatment this time.     Pt did not follow through on 20 CD assessment update recommendations of inpatient treatment. Per EHR 3/25/20 psychiatric consult note:\"He is under commitment, awaiting disposition to an appropriate program\".  See commitment information in last section of update. Pt has been hospitalized 17 times related to alcohol.     Per EHR CD update on 20:  Patient verbalizes a desire to establish abstinence but has lacked follow through with previous recommendations and placement. Patient has limited chance of success remaining sober without further inpatient treatment as he has had 16 ED or admissions to hospital related to his alcohol use. "   Dimension V: Relapse/Continued Use/Continued problem potential     Previous ratin Current ratin   Comments:   Per patient:Learning again what it takes to be sober. Been to about 6 treatment centers and first time I would really like to go to [facility]. After done with inpatient due AA and doing outpatient program. Sick of being sick.     Pt was hospitalized on 2/3/20-20 and discharged, continued to drink, did not follow through on inpatient treatment recommendations like he verbalized during that admission. Pt was then readmitted to [facility] on 3/7/20. Pt reported he had continued to drink daily. Pt lacks the appropriate recovery skills (relapse prevention skills, sober coping skills, impulse control, insight into the disease concept of addiction, and insight into co-occurring dx's) and is at a high risk for relapse/continued use.      Per EHR CD update on 20:  Patient has a history of treatment and in past year multiple hospitalizations related to alcohol use and health issues.  He has not been able to maintain any abstinence following discharge to home and needs to establish longer period of abstinence while in a controlled environment to continue building motivation for abstinence and daily sober living skills.     Dimension VI: Recovery environment    Previous ratin Current ratin   Comments:   Pt reported he continues to live with a roommate who is sober. Pt reported he is not working.     Pt lacks structured sober routine and sober support network. Pt reported he lives with a roommate who is sober but reported he has continued to drink while living there. Per EHR has had multiple readmissions to the hospital related to continued use after discharging from the hospital and going back into that living environment. Pt appears to lack a living environment that is conducive/supportive to obtaining sobriety and he is at high risk for continued use if he goes back to his  "prior living environment.     Per EHR CD update on 2/13/20:   Patient reports he lives with a roommate who does not drink and is sober.  He has supportive family members as well.  He is not working and lacks any meaningful activity.       Summary of Assessment Update and Recommendations:   What was the outcome of last referral?  Recommendation to [facility] inpatient treatment. Per EHR pt discharged from hospital and did not follow through on CD update recommendations.     Reason for changes in the Risk Description since last assessment? Patient has not followed through with recommendations and continues to drink despite multiple interventions.  His motivation for sobriety is poor being he has had access to treatment and multiple supports but does not utilize them. Pt has continued with similar pattern once discharged from the hospital and residing back at his prior living environment. Per EHR [facility] supported commitment. See below.      Recommendation and rationale for current request and significant issues that need to be addressed:    Pt is recommended:  1. Abstain from using all non-prescribed mood altering chemicals and substances. Take all medication as prescribed and directed by licensed physicians.  2. Complete a residential treatment program such as [facility] or [facility]. Follow all recommendations and referrals made by treatment counselor(s).  3. Follow all recommendations and referrals made by [facility].  4. Follow all recommendations and referrals made by hospital team during admission and at discharge.     Diagnosis:Alcohol Use Disorder, severe. F10.20, 303.90    Per EHR progress note on 3/12/20:  \"DANIELE Note:  D/I:  DANIELE received a call back from Joya with Pre Petitioned Screening who stated that hospital would not need to provide exhibit A if we were not petitioning for commitment.  She stated that the  will complete that document if the family is petitioning " "for commitment.   Addendum: SW received call from Clara JUAREZ With Pre petition Screening ([phone]) who stated that the petition is going to be supported and patient will have a court hold when 72 hour hold expires.  SW to call  office tomorrow to follow up ([phone]).    P: SW will continue to assist and follow for discharge.  Brad Clifford MSW, LGSW  [phone]  [facility]\"    Per EHR CD update on 20:  Patient is recommended to attend inpatient treatment at [facility].  Patient has limited chance of success remaining sober without further inpatient treatment as he has had 16 ED or admissions to hospital related to his alcohol use.       Type of service:  Comprehensive Assessment - Update  Time Service Began:  9:21am  Time Service Ended:  9:46am  Originating Location (pt. Location):  [address] [address]    Distant Location (provider location):  [facility]   Reason for Televisit:  Provider off-site  Mode of Communication:  Video Conference via [employer]  As the provider I attest to compliance with applicable laws and regulations related to telemedicine.  SYLVIE Sarmiento      Assessment and Placement Summary Update     Patient name:   Jim Richard   Patient phone: [phone] (home) none (work)   Last #:   [id number]   : 1955      PMI #: This patient does not have a PMI number.   Patient address:   [address] [place] [address]     Date of Original Assessment / Last Update: 10/27/19 Update Assessment Date: 2020   Updated by:   SYLVIE Hoffman    phone number: [phone]   Referred by:   Replaced by [facility] / phone number: [phone]   Referral to:   [facility]   NPI: NPI unknown   Summary:  This patient had a Rule 25 Assessment on 10/27/19 at [facility] completed by AMADO Hammer, [facility].  INSIDE: The patient's " Rule 25 Assessment completed on 10/27/19 is in the patient's electronic medical record in Epic in the Chart Review section under the Notes/Trans Tab.    Reason for today's update: The history was obtained from reviewing medical records and staff ordered cd consult:           Substance Use History Update:           X = Primary Drug Used   Age of First Use Most Recent Pattern of Use and Duration   Need enough information to show pattern (both frequency and amounts) and to show tolerance for each chemical that has a diagnosis   Date of last use and time, if needed   Withdrawal Potential? Requiring special care Method of use  (oral, smoked, snort, IV, etc)   x   Alcohol     15 1-1.5pints/day   2/3/20 no oral      Marijuana/  Hashish   17 none 30+ years ago no smoke      Cocaine/Crack     20's None 20's no snort      Meth/  Amphetamines   No use          Heroin     No use          Other Opiates/  Synthetics   No use          Inhalants     No use          Benzodiazepines     No use          Hallucinogens     No use          Barbiturates/  Sedatives/  Hypnotics No use          Over-the-Counter Drugs   No use          Other     No use          Nicotine     15 Pack/day 2/3/20 no smoke     Dimension: Severity Rating/ Reason for Changes from Previous Assessment:  Dimension I: Acute intoxication/Withdrawal potential     Previous ratin Current ratin   Comments:   Patient had a BAL of 0.39 on admission.  He did require withdrawal protocols.  This day was alert and oriented.     Dimension II: Biomedical Conditions and Complications     Previous ratin Current ratin   Comments:   See physician H&P for full medical history and current medications administered.  Patient does not have any health issues that would interfere with his ability to participate in treatment programming.     Dimension III: Emotional/Behavioral/Cognitive     Previous ratin Current ratin   Comments:   Patient reports  depression and anxiety, and no current mental health providers.  He denies any suicidal ideation. He reports anxiety is the reason he struggles with establishing and maintaining abstinence.  Patient was seen by psychiatry, please refer to psychiatrist notes for further mental health assessment information.     Dimension IV: Readiness for Change     Previous rating:   3 Current ratin   Comments:   Patient verbalizes a desire to establish abstinence but has lacked follow through with previous recommendations and placement.      Dimension V: Relapse/Continued Use/Continued problem potential     Previous ratin Current ratin   Comments:   Patient has a history of treatment and in past year multiple hospitalizations related to alcohol use and health issues.  He has not been able to maintain any abstinence following discharge to home and needs to establish longer period of abstinence while in a controlled environment to continue building motivation for abstinence and daily sober living skills.     Dimension VI: Recovery environment    Previous ratin Current rating:   3   Comments:   Patient reports he lives with a roommate who does not drink and is sober.  He has supportive family members as well.  He is not working and lacks any meaningful activity.       Summary of Assessment Update and Recommendations:   What was the outcome of last referral?  Recommendation to [facility] or [facility]     Reason for changes in the Risk Description since last assessment? Patient has not followed through with recommendations and continues to drink despite interventions.  His motivation for sobriety is questionable being he has access to treatment and support but does not utilize.     Recommendation and rationale for current request and significant issues that need to be addressed:    Patient is recommended to attend inpatient treatment at [facility].    Patient has limited chance of success remaining sober without further  "inpatient treatment as he has had 16 ED or admissions to hospital related to his alcohol use.     Rule 25 Assessment  Background Information   1. Date of Assessment Request  2. Date of Assessment  10/27/19   3. Date Service Authorized     4.   AMADO Hammer, SYLVIE     5.  Phone Number   [phone]   6. Referent  [facility] 7. Assessment Site  [facility]     8. Client Name   Jim Richard 9. Date of Birth  1955 Age  64 year old 10. Gender  male  11. PMI/ Insurance No.  [id number]   12. Client's Primary Language:  English 13. Do you require special accommodations, such as an  or assistance with written material? No   14. Current Address: [address] [place] [address]   15. Client Phone Numbers: [phone] (home) none (work)     16. Tell me what has happened to bring you here today.    Per EMR ED admission note on 10/22/19:  \"  History      Chief Complaint:  Ingestion and Suicidal     HPI   Jim Richard is a 64 year old male with history of substance abuse, depression, anxiety, esophageal varices with bleeding, who presents with intentional ingestion and suicidal ideation. Per RN report, the patient voiced that he wants to die. He reported that he took his anti-anxiety meds, blood pressure meds, and drank alcohol. He also was \"tapped\" about 3 weeks ago and he feels that his abdomen is very distended. Here, the patient endorses that he took 2 x hydrochlorothiazide, 2 x spironolactone, and last drank this morning. He voices that he drinks \"7.5\" oz/drinks a day. He has been through withdrawal before with no prior seizures, and he is not withdrawing here, per his report. He denies any vomiting or any other drug ingestion. He endorses baseline leg swelling, so this is not new today\".          17. Have you had other rule 25 assessments?     Yes. When, Where, and What circumstances: see above, per EMR CD assessment on " "12/14/16 through Matt.     DIMENSION I - Acute Intoxication /Withdrawal Potential   1. Chemical use most recent 12 months outside a facility and other significant use history (client self-report)              X = Primary Drug Used   Age of First Use Most Recent Pattern of Use and Duration   Need enough information to show pattern (both frequency and amounts) and to show tolerance for each chemical that has a diagnosis   Date of last use and time, if needed   Withdrawal Potential? Requiring special care Method of use  (oral, smoked, snort, IV, etc)     X Alcohol     15 Per patient: pint daily.    Per EMR ED admission note on 10/21/19:  He voices that he drinks \"7.5\" oz/drinks a day.     Per EHR ED admission note on 9/25/19:  Jim Richard is a 64 year old male who is six days sober and has a history of GI ulcers,and  esophageal varices who presents to the emergency department today via EMS for evaluation of generalized weakness. The patient reports he had abstained from alcohol use for three week prior to drinking for one day on 09/19/2019.     Per EMR CD assessment on 12/14/16:  Past year      1) Tolerance:     Liter of vodka; all day long; 10-15 times     2) Withdrawal:     Shakes; sweats; increased depression and anxiety;     3) Using more than planned:      50%;   4) Sincere desire to cut down/unsuccessful efforts:     Two treatments this past y ear and going AA;   5) Spending a lot of time using/recovering from using:   All day when drink;   Two days to recover  6) a)recurrent  failure to fulfill obligations at work, school or home:     Can't drive the next day due to drinking        b) persistent, recurrent social or interpersonal problems caused by use:     Son upset about drinking         c) given up/reduced/ neglected activities: social, occupational, or recreational activities :      driving because of drinking       d) recurrent use in situation where physically hazardous:       7) Use knowing it " makes a mental/physical condition worse:     Knows that mi meds don't work if driking since 2011; black out almost every time;     8) Cravings (strong urge/desire to use the substance, may be more intense when around certain persons, places or things):     5/10    Last Use Date and time of last use in the past 30 days and description of use in last 30 days:     10/22/19, nicholas morning Currently admitted to [facility] oral      Marijuana/  Hashish   18 Pt denied use.     Per EMR CD assessment on 12/14/16:  20-25     1) Tolerance:   1/8-1/4  2) Withdrawal:     3) Using more than planned:        4) Sincere desire to cut down/unsuccessful efforts:       5) Spending a lot of time using/recovering from using:   3-4 times a week    6) a)recurrent  failure to fulfill obligations at work, school or home:         b) persistent, recurrent social or interpersonal problems caused by use:         c) given up/reduced/ neglected activities: social, occupational, or recreational activities :   islollated from others.       d) recurrent use in situation where physically hazardous:     7) Use knowing it makes a mental/physical condition worse:     8) Cravings (strong urge/desire to use the substance, may be more intense when around certain persons, places or things):     0/10  Last Use Date and time of last use in the past 30 days and description of use in last 30 days:   30+  years ago no smoke      Cocaine/Crack     20s   Per pt:denied use.    Per EMR CD assessment on 12/14/16:  10-15 times  20s no snort      Meth/  Amphetamines   No use           Heroin     No use           Other Opiates/  Synthetics   No use            Inhalants     No use           Benzodiazepines     No use           Hallucinogens     No use           Barbiturates/  Sedatives/  Hypnotics No use           Over-the-Counter Drugs   No use           Other     No use           Nicotine     16 Per patient: pack per day    Per EMR CD assessment on 12/14/16:  1) Tolerance:      Pack a day   2) Withdrawal:   Hard to quit    3) Using more than planned:        4) Sincere desire to cut down/unsuccessful efforts:   Try to cut down and it hasn't worked  Gotten lozenges   5) Spending a lot of time using/recovering from using:   Daily   6) a)recurrent  failure to fulfill obligations at work, school or home:         b) persistent, recurrent social or interpersonal problems caused by use:   Family wants me to quit      c) given up/reduced/ neglected activities: social, occupational, or recreational activities :   Don't exercise as much         d) recurrent use in situation where physically hazardous:       7) Use knowing it makes a mental/physical condition worse:     Yes;   8) Cravings (strong urge/desire to use the substance, may be more intense when around certain persons, places or things):   10/10     Last Use Date and time of last use in the past 30 days and description of use in last 30 days:   10/22/19, pack, morning  no smoke     2. Do you use greater amounts of alcohol/other drugs to feel intoxicated or achieve the desired effect?  Yes.  Or use the same amount and get less of an effect?  No.  Example: see above     3A. Have you ever been to detox?     Yes    3B. When was the first time?     1/2013    3C. How many times since then?     3 detox     3D. Date of most recent detox:     2016    4.  Withdrawal symptoms: Have you had any of the following withdrawal symptoms?  Past 12 months Recent (past 30 days)   Sweating (Rapid Pulse)  Shaky / Jittery / Tremors  Sad / Depressed Feeling  High Blood Pressure  Anxiety / Worried Sweating (Rapid Pulse)  Shaky / Jittery / Tremors  Unable to Sleep  Agitation  Headache  Fatigue / Extremely Tired  Sad / Depressed Feeling  Muscle Aches  Vivid / Unpleasant Dreams  Irritability  Sensitivity to Noise  Nausea / Vomiting  Diarrhea  Diminished Appetite  Hallucinations  Confused / Disrupted Speech  Anxiety / Worried     's Visual Observations and  Symptoms: Pt currently admitted to [facility] and on withdrawal protocol.     Based on the above information, is withdrawal likely to require attention as part of treatment participation?  No    Dimension I Ratings   Acute intoxication/Withdrawal potential - The placing authority must use the criteria in Dimension I to determine a client s acute intoxication and withdrawal potential.    RISK DESCRIPTIONS - Severity ratin Client displays full functioning with good ability to tolerate and cope with withdrawal discomfort. No signs or symptoms of intoxication or withdrawal or resolving signs or symptoms.    REASONS SEVERITY WAS ASSIGNED (What about the amount of the person s use and date of most recent use and history of withdrawal problems suggests the potential of withdrawal symptoms requiring professional assistance? )     Pt currently admitted to [facility] and on withdrawal protocol. Pt admitted to [facility] related to use.            DIMENSION II - Biomedical Complications and Conditions   1. Do you have any current health/medical conditions?(Include any infectious diseases, allergies, or chronic or acute pain, history of chronic conditions)       Yes.   Illnesses/Medical Conditions you are receiving care for:   Per EMR:  Past Medical History:   Diagnosis Date     Alcoholism (H) 2013    had treatment at [facility]     Anxiety      Coronary artery disease 2014    Nuclear stress test with slight fixed defect inferiorly, no ischemia     Depression     w/anxiety     Esophageal varices with bleeding 2018    6 varices banded on EGD     Heart attack (H)      Hepatomegaly     Fatty liver, chronic alcoholic     Hyperlipemia      Hypertension      JANIS on CPAP      Peripheral vascular disease (H)      PVD (peripheral vascular disease) (H)      SDH (subdural hematoma) (H) 2018    Right side, resolved spontaneously     Spinal stenosis        2. Do you have a health care provider? When was your most recent  appointment? What concerns were identified?     Dr. Ruggiero; [place], currently admitted to [facility].    3. If indicated by answers to items 1 or 2: How do you deal with these concerns? Is that working for you? If you are not receiving care for this problem, why not?      Medications    4A. List current medication(s) including over-the-counter or herbal supplements--including pain management:     Per eMR prescribed medications hx:    Prior to Admission medications    Medication Sig Last Dose Taking? Auth Provider   albuterol (PROAIR HFA/PROVENTIL HFA/VENTOLIN HFA) 108 (90 BASE) MCG/ACT Inhaler Inhale 2 puffs into the lungs every 6 hours as needed  prn Yes Unknown, Entered By History   DULoxetine (CYMBALTA) 60 MG EC capsule Take 1 capsule (60 mg) by mouth daily unknown Yes Brody Rand MD   fluticasone-vilanterol (BREO ELLIPTA) 200-25 MCG/INH oral inhaler Inhale 1 puff into the lungs daily    Yes Unknown, Entered By History   folic acid (FOLVITE) 1 MG tablet Take 1 tablet (1 mg) by mouth daily   Yes Elena Chiang MD   furosemide (LASIX) 40 MG tablet Take 1 tablet (40 mg) by mouth daily   Yes Elena Chiang MD   lactulose (CHRONULAC) 10 GM/15ML solution Take 30 mLs (20 g) by mouth 2 times daily   Yes Donell Lucas MD   mirtazapine (REMERON) 15 MG tablet Take 15 mg by mouth At Bedtime   Yes Unknown, Entered By History   multivitamin, therapeutic with minerals (THERA-VIT-M) TABS tablet Take 1 tablet by mouth daily   Yes Jude Duarte,    nicotine (NICODERM CQ) 21 MG/24HR 24 hr patch Place 1 patch onto the skin daily   Yes Elena Chiang MD   pantoprazole (PROTONIX) 40 MG EC tablet Take 1 tablet (40 mg) by mouth every morning (before breakfast)   Yes Elena Chiang MD   propranolol (INDERAL) 10 MG tablet Take 1 tablet (10 mg) by mouth 2 times daily   Yes Elena Chiang MD   QUEtiapine (SEROQUEL) 50 MG tablet Take 50 mg by mouth At Bedtime    Yes Unknown, Entered By History    spironolactone (ALDACTONE) 25 MG tablet Take 2 tablets (50 mg) by mouth daily   Yes Elena Chiang MD   tamsulosin (FLOMAX) 0.4 MG capsule Take 0.4 mg by mouth daily   Yes Unknown, Entered By History   traZODone (DESYREL) 100 MG tablet Take 200 mg by mouth At Bedtime   Yes Unknown, Entered By History   vitamin B1 (THIAMINE) 100 MG tablet Take 1 tablet (100 mg) by mouth daily   Yes Elena Chiang MD   order for DME Equipment being ordered: Walker Wheels () and Walker ()  Treatment Diagnosis: difficulty walking     Donell Lucas MD      No current facility-administered medications for this encounter.      No current outpatient medications on file.     Facility-Administered Medications Ordered in Other Encounters   Medication     acetaminophen (TYLENOL) tablet 650 mg     glucose gel 15-30 g    Or     dextrose 50 % injection 25-50 mL    Or     glucagon injection 1 mg     DULoxetine (CYMBALTA) DR capsule 60 mg     fluticasone-vilanterol (BREO ELLIPTA) 200-25 MCG/INH inhaler 1 puff     furosemide (LASIX) tablet 20 mg     hydrOXYzine (ATARAX) tablet 25-50 mg     lactulose (CHRONULAC) solution 10 g     lidocaine (LMX4) cream     lidocaine 1 % 0.1-1 mL     May continue current IV fluids if patient has IV fluids infusing.     May take regular AM medications except those listed below.     melatonin tablet 1 mg     miconazole (MICATIN) 2 % powder     mirtazapine (REMERON) tablet TABS 15 mg     naloxone (NARCAN) injection 0.1-0.4 mg     nicotine (NICODERM CQ) 14 MG/24HR 24 hr patch 1 patch     nicotine Patch in Place     ondansetron (ZOFRAN-ODT) ODT tab 4 mg    Or     ondansetron (ZOFRAN) injection 4 mg     pantoprazole (PROTONIX) EC tablet 40 mg     QUEtiapine (SEROquel) tablet 50 mg     sodium chloride (PF) 0.9% PF flush 3 mL     sodium chloride (PF) 0.9% PF flush 3 mL     spironolactone (ALDACTONE) tablet 25 mg     tamsulosin (FLOMAX) capsule 0.4 mg     traZODone (DESYREL) tablet 100 mg        4B. Do  you follow current medical recommendations/take medications as prescribed?     Yes    4C. When did you last take your medication?     This morning     5. Has a health care provider/healer ever recommended that you reduce or quit alcohol/drug use?     Yes    6. Are you pregnant?     No    7. Have you had any injuries, assaults/violence towards you, accidents, health related issues, overdose(s) or hospitalizations related to your use of alcohol or other drugs:     Yes, explain: falling down and hit ribs. Pt is currently admitted.  Per EMR ED admissions/hospitalizations on 19, 19,7/10/19, 7/3/19,19,19, 18,18, 18.    8. Do you have any specific physical needs/accommodations? No    Dimension II Ratings   Biomedical Conditions and Complications - The placing authority must use the criteria in Dimension II to determine a client s biomedical conditions and complications.   RISK DESCRIPTIONS - Severity ratin Client tolerates and ramone with physical discomfort and is able to get the services that the client needs.    REASONS SEVERITY WAS ASSIGNED (What physical/medical problems does this person have that would inhibit his or her ability to participate in treatment? What issues does he or she have that require assistance to address?)    Pt reported medical conditions. Pt reported he has a pcp and is able to get the services he needs. Pt reported he has prescribed medications by his pcp for his medical conditions. Pt reported numerous past ED/hospitalization admissions related to use.       DIMENSION III - Emotional, Behavioral, Cognitive Conditions and Complications   1. (Optional) Tell me what it was like growing up in your family. (substance use, mental health, discipline, abuse, support)     Were you adopted?  Not adopted     Were your parents  or together?   Stayed together     When ?      Any emotional/ physical abuse in the family?  None     Any MI/CD issues?   Dad  "and brother cd     2. When was the last time that you had significant problems...  A. with feeling very trapped, lonely, sad, blue, depressed or hopeless  about the future? Past month    B. with sleep trouble, such as bad dreams, sleeping restlessly, or falling  asleep during the day? Past month    C. with feeling very anxious, nervous, tense, scared, panicked, or like  something bad was going to happen? Past month     D. with becoming very distressed and upset when something reminded  you of the past? 2-12 months ago    E. with thinking about ending your life or committing suicide? Past month    3. When was the last time that you did the following things two or more times?  A. Lied or conned to get things you wanted or to avoid having to do  something? Never    B. Had a hard time paying attention at school, work, or home? Never    C. Had a hard time listening to instructions at school, work, or home? 1+ years ago    D. Were a bully or threatened other people? Never    E. Started physical fights with other people? Never    Note: These questions are from the Global Appraisal of Individual Needs--Short Screener. Any item marked  past month  or  2 to 12 months ago  will be scored with a severity rating of at least 2.     For each item that has occurred in the past month or past year ask follow up questions to determine how often the person has felt this way or has the behavior occurred? How recently? How has it affected their daily living? And, whether they were using or in withdrawal at the time?    Per EMR psychiatric consult note on 10/23/19:  CHIEF COMPLAINT:  \"Yeah, but I'm not suicidal now.\"      HISTORY OF PRESENT ILLNESS:  The patient is a 64-year-old gentleman with a very severe persistent alcohol use disorder, as well as major depressive disorder.  He has been hospitalized here on a number of occasions and was seen by Dr. Kearney for psychiatric consultation last month.  He claims his depression has not been " "controlled.  He has been drinking 0.5 liters of vodka daily over the last week.  He came into the ER heavily intoxicated with a blood alcohol level of 0.4 and a negative drug screen.  He has known esophageal varices and has evidence of ascites.  He has been in treatment countless times and despite recurrent recommendations to go into treatment, he has refused this repeatedly.  Now he is telling me he would agree to go to treatment, stating \"I need to do something.\"  He states he is no longer feeling suicidal.  He is convalescing on station 66 and seems to be showing signs of withdrawal.  He has been taking Cymbalta, Remeron and Seroquel.  It is unclear if he has been compliant with these medications.  He has a distant history of a suicide gesture by his report.      On further questioning, there is no convincing history of hypomania, bear, generalized anxiety, panic, obsessive-compulsive disorder, eating disorder history, trauma history, ADHD.  He alludes to some issues with sleep, anxiety and depressed mood.  This seems to escalate when he is drinking.    4A. If the person has answered item 2E with  in the past year  or  the past month , ask about frequency and history of suicide in the family or someone close and whether they were under the influence.     Pt denied current SI.     Any history of suicide in your family? Or someone close to you?     No    4B. If the person answered item 2E  in the past month  ask about  intent, plan, means and access and any other follow-up information  to determine imminent risk. Document any actions taken to intervene  on any identified imminent risk.      Pt denied current SI.    5A. Have you ever been diagnosed with a mental health problem?     Yes, If yes explain: per EMR:MADD. Per patient: anxiety and depression     5B. Are you receiving care for any mental health issues? If yes, what is the focus of that care or treatment?  Are you satisfied with the service? Most recent " appointment?  How has it been helpful?     Yes, no MH providers currently. In the past psychiatrist Andreina Barrientos at [facility].    6. Have you been prescribed medications for emotional/psychological problems?     Yes.  6B. Current mental health medication(s) If these medications are listed for Dimension II, reference item II-5. See above .   6C. Are you taking your medications as instructed?  yes.    7. Does your  provider know about your use?     No providers.    8A. Have you ever been verbally, emotionally, physically or sexually abused?      No     Follow up questions to learn current risk, continuing emotional impact.      None    8B. Have you received counseling for abuse?      None    9. Have you ever experienced or been part of a group that experienced community violence, historical trauma, rape or assault?     No    10A. :    No    11. Do you have problems with any of the following things in your daily life?    Problem Solving, Concentration, Remembering and In relationships with others    Note: If the person has any of the above problems, follow up with items 12, 13, and 14. If none of the issues in item 11 are a problem for the person, skip to item 15.    Pt would benefit from coping skills.     12. Have you been diagnosed with traumatic brain injury or Alzheimer s?  No    13. If the answer to #12 is no, ask the following questions:    Have you ever hit your head or been hit on the head? Yes hockey stick     Were you ever seen in the Emergency Room, hospital or by a doctor because of an injury to your head? Yes    Have you had any significant illness that affected your brain (brain tumor, meningitis, West Nile Virus, stroke or seizure, heart attack, near drowning or near suffocation)? No    14. If the answer to #12 is yes, ask if any of the problems identified in #11 occurred since the head injury or loss of oxygen. No    15A. Highest grade of school completed:     14 years     15B. Do you  have a learning disability? No    15C. Did you ever have tutoring in Math or English? No    15D. Have you ever been diagnosed with Fetal Alcohol Effects or Fetal Alcohol Syndrome? No    16. If yes to item 15 B, C, or D: How has this affected your use or been affected by your use?     None    Dimension III Ratings   Emotional/Behavioral/Cognitive - The placing authority must use the criteria in Dimension III to determine a client s emotional, behavioral, and cognitive conditions and complications.   RISK DESCRIPTIONS - Severity ratin Client has difficulty with impulse control and lacks coping skills. Client has thoughts of suicide or harm to others without means; however, the thoughts may interfere with participation in some treatment activities. Client has difficulty functioning in significant life areas. Client has moderate symptoms of emotional, behavioral, or cognitive problems. Client is able to participate in most treatment activities.    REASONS SEVERITY WAS ASSIGNED - What current issues might with thinking, feelings or behavior pose barriers to participation in a treatment program? What coping skills or other assets does the person have to offset those issues? Are these problems that can be initially accommodated by a treatment provider? If not, what specialized skills or attributes must a provider have?    Pt reported mental health diagnosis. Pt reported he has prescribed medications for his MH symptoms. Pt denied current therapy. Pt denied past or current abuse of any type. Pt denied current SI. Pt denied past suicide attempts.        DIMENSION IV - Readiness for Change   1. You ve told me what brought you here today. (first section) What do you think the problem really is?     Pt reported desire for inpatient treatment.     2. Tell me how things are going. Ask enough questions to determine whether the person has use related problems or assets that can be built upon in the following areas:  Family/friends/relationships; Legal; Financial; Emotional; Educational; Recreational/ leisure; Vocational/employment; Living arrangements (DSM)      See above    3. What activities have you engaged in when using alcohol/other drugs that could be hazardous to you or others (i.e. driving a car/motorcycle/boat, operating machinery, unsafe sex, sharing needles for drugs or tattoos, etc     Driving     4. How much time do you spend getting, using or getting over using alcohol or drugs? (DSM)     See answer to question number one, dimension one above, re chemical use history.       5. Reasons for drinking/drug use (Use the space below to record answers. It may not be necessary to ask each item.)  Like the feeling Yes   Trying to forget problems Yes   To cope with stress Yes   To relieve physical pain Yes   To cope with anxiety Yes   To cope with depression Yes   To relax or unwind No   Makes it easier to talk with people Yes   Partner encourages use N/A   Most friends drink or use Yes   To cope with family problems Yes   Afraid of withdrawal symptoms/to feel better No   Other (specify)  No     A. What concerns other people about your alcohol or drug use/Has anyone told you that you use too much? What did they say? (DSM)     Pt reported family and friends.    B. What did you think about that/ do you think you have a problem with alcohol or drug use?     Yes     6. What changes are you willing to make? What substance are you willing to stop using? How are you going to do that? Have you tried that before? What interfered with your success with that goal?      Do anything     7. What would be helpful to you in making this change?     Inpatient treatment, AA     Dimension IV Ratings   Readiness for Change - The placing authority must use the criteria in Dimension IV to determine a client s readiness for change.   RISK DESCRIPTIONS - Severity rating: 3 Client displays inconsistent compliance, minimal awareness of either the  client s addiction or mental disorder, and is minimally cooperative.    REASONS SEVERITY WAS ASSIGNED - (What information did the person provide that supports your assessment of his or her readiness to change? How aware is the person of problems caused by continued use? How willing is she or he to make changes? What does the person feel would be helpful? What has the person been able to do without help?)      Pt reported desire for inpatient treatment. Pt has had numerous ED/hospitalizations related to use and despite recommendation by medical providers of seeking treatment in the past admissions pt declined and then re-admitted related to use.           DIMENSION V - Relapse, Continued Use, and Continued Problem Potential   1. In what ways have you tried to control, cut-down or quit your use? If you have had periods of sobriety, how did you accomplish that? What was helpful? What happened to prevent you from continuing your sobriety? (DSM)     Pt reported: abstaining from use in the last coming months due to being in the hospital been sober otherwise, using daily when not admitted    Per EMR CD assessment on 12/14/16:Go to AA; myself; sponsor.      2. Have you experienced cravings? If yes, ask follow up questions to determine if the person recognizes triggers and if the person has had any success in dealing with them.     Yes    3. Have you been treated for alcohol/other drug abuse/dependence?     Yes.  3B. Number of times(lifetime) (over what period) 5 .  3C. Number of times completed treatment (lifetime) 4.  3D. During the past three years have you participated in outpatient and/or residential?  Yes.  3E. When and where? [facility] and [place] .   3F. What was helpful? What was not? 12 steps helpful and spending [place] there .    Per EMR Recovery Services HOD/IP note on 1/25/17:  EVALUATION COUNSELOR:  Arden Zimmerman JD, MA, LADC.   TREATMENT COUNSELOR:  Earline Rubio, MS, LADDAYANA, LPC.   REFERRAL SOURCE:  Self and  [facility].   PROGRAM:  [facility] [place] [facility].   ADMISSION DATE:  12/20/2016.   DISCHARGE DATE:  01/26/2017.   ADMISSION DIAGNOSES:   1.  303.90/F10.20, alcohol use disorder, severe.     2.  305.20/F12.10, cannabis use disorder, mild, in sustained remission.     3.  305.10/F17.200,  tobacco use disorder, severe.   DISCHARGE DIAGNOSES:   1.  303.90/F10.20, alcohol use disorder, severe.     2.  305.20/F12.10, cannabis use disorder, mild, in sustained remission.     3.  305.10/F17.200,  tobacco use disorder, severe.   DISCHARGE STATUS:  05, transferred to another program.   LAST USE DATE:  Jim Richard self-reports that 01/26/2017 was his last use date which is this day of dictation.   HOURS OF TREATMENT COMPLETED:  18.   PROGNOSIS:  Unfavorable at this time.  However, it can be raised to favorable should he follow through with the treatment program and their recommendations and be able to remain abstinent.   LIVING ARRANGEMENTS AT DISCHARGE:  At home or temporarily in a lodging facility.   CONTINUING CARE RECOMMENDATIONS:  It is recommended that Jim follow through with the treatment program in a lodging facility.  He should possibly take Antabuse and/or Campral or naltrexone during or after his stay as he seems to be having great cravings.  Jim should also seek the support of a therapist and discuss his depression as well as possible grief issues related to his divorce and even to his chronic pain.  It is recommended that he continue to work with his Pain Clinic and his psychiatrist.  He should continue to take his medications as directed and get regular checkups with this.     4. Support group participation: Have you/do you attend support group meetings to reduce/stop your alcohol/drug use? How recently? What was your experience? Are you willing to restart? If the person has not participated, is he or she willing?     AA-last time went about a  month      5. What would assist you in staying sober/straight?     See above     Dimension V Ratings   Relapse/Continued Use/Continued problem potential - The placing authority must use the criteria in Dimension V to determine a client s relapse, continued use, and continued problem potential.   RISK DESCRIPTIONS - Severity ratin No awareness of the negative impact of mental health problems or substance abuse. No coping skills to arrest mental health or addiction illnesses, or prevent relapse.    REASONS SEVERITY WAS ASSIGNED - (What information did the person provide that indicates his or her understanding of relapse issues? What about the person s experience indicates how prone he or she is to relapse? What coping skills does the person have that decrease relapse potential?)      Pt reported drinking daily. Pt reported only time he can abstain from use is when he is hospitalized. Pt reported cravings. Pt reported five past treatments and his last treatment was [id number]. Pt reported past sober support group meetings. Pt appears to be at high risk for continued use evidenced by his use pattern. Pt appears to lacks impulse control, sober coping skills, and long-term sober maintenance skills.          DIMENSION VI - Recovery Environment   1. Are you employed/attending school? Tell me about that.     Pt reported he is not working.     2A. Describe a typical day; evening for you. Work, school, social, leisure, volunteer, spiritual practices.  No typical day      Drinking      2B. How often do you spend more time than you planned using or use more than you planned? (DSM)     Daily.       3. How important is using to your social connections? Do many of your family or friends use?     Not important     4A. Are you currently in a significant relationship?     No    4C. Sexual Orientation:     Heterosexual    5A. Who do you live with?      Town home roommate    5B. Tell me about their alcohol/drug use and mental health  issues.     Sober roommate.     5C. Are you concerned for your safety there? No    5D. Are you concerned about the safety of anyone else who lives with you? No    6A. Do you have children who live with you?     No    6B. Do you have children who do not live with you?     No    7A. Who supports you in making changes in your alcohol or drug use? What are they willing to do to support you? Who is upset or angry about you making changes in your alcohol or drug use? How big a problem is this for you?      See below     7B. This table is provided to record information about the person s relationships and available support It is not necessary to ask each item; only to get a comprehensive picture of their support system.  How often can you count on the following people when you need someone?   Partner / Spouse No partner   Parent(s)/Aunt(s)/Uncle(s)/Grandparents No   Sibling(s)/Cousin(s) Usually supportive   Child(iesha) No children   Other relative(s) No   Friend(s)/neighbor(s) Always supportive   Child(iesha) s father(s)/mother(s) Always supportive   Support group member(s) Always supportive   Community of [place] members No   /counselor/therapist/healer No   Other (specify) No     8A. What is your current living situation?     In a town home     8B. What is your long term plan for where you will be living?     As long as I can     8C. Tell me about your living environment/neighborhood? Ask enough follow up questions to determine safety, criminal activity, availability of alcohol and drugs, supportive or antagonistic to the person making changes.      Good neighborhood     9. Criminal justice history: Gather current/recent history and any significant history related to substance use--Arrests? Convictions? Circumstances? Alcohol or drug involvement? Sentences? Still on probation or parole? Expectations of the court? Current court order? Any sex offenses - lifetime? What level? (DSM)    Two dwis     10. What  obstacles exist to participating in treatment? (Time off work, childcare, funding, transportation, pending custodial time, living situation)     None    Dimension VI Ratings   Recovery environment - The placing authority must use the criteria in Dimension VI to determine a client s recovery environment.   RISK DESCRIPTIONS - Severity ratin Client has (A) Chronically antagonistic significant other, living environment, family, peer group or long-term criminal justice involvement that is harmful to recovery or treatment progress, or (B) Client has an actively antagonistic significant other, family, work, or living environment with immediate threat to the client s safety and well-being.    REASONS SEVERITY WAS ASSIGNED - (What support does the person have for making changes? What structure/stability does the person have in his or her daily life that will increase the likelihood that changes can be sustained? What problems exist in the person s environment that will jeopardize getting/staying clean and sober?)     Pt reported he is not working. Pt reported he lives with a roommate who is sober in a town home. Pt reported past legal issues but denied current. Pt denied being in a romantic relationship or having children. Pt appears to lack sober support network and sober structured routine. Pt appears to lack a living environment conducive to sobriety.           Client Choice/Exceptions   Would you like services specific to language, age, gender, culture, Christian preference, race, ethnicity, sexual orientation or disability?  No    What particular treatment choices and options would you like to have? IP    Do you have a preference for a particular treatment program? None    Criteria for Diagnosis     Criteria for Diagnosis  DSM-5 Criteria for Substance Use Disorder  Instructions: Determine whether the client currently meets the criteria for Substance Use Disorder using the diagnostic criteria in the DSM-V pp.[phone].  Current means during the most recent 12 months outside a facility that controls access to substances    Category of Substance Severity (ICD-10 Code / DSM 5 Code)     Alcohol Use Disorder Severe  (10.20) (303.90)   Cannabis Use Disorder Mild  (F12.10) (305.20) in sustained remission   Hallucinogen Use Disorder No   Inhalant Use Disorder No   Opioid Use Disorder No   Sedative, Hypnotic, or Anxiolytic Use Disorder No   Stimulant Related Disorder No   Tobacco Use Disorder Severe   (F17.200) (305.1)    Other (or unknown) Substance Use Disorder No       Collateral Contact Summary   Number of contacts made: one      Contact with referring person:  Yes, EMR.    If court related records were reviewed, summarize here: No    Rule 25 Assessment Summary and Plan   's Recommendation    IP treatment through [facility] or [facility].       Collateral Contacts     Name:    Electronic Medical Records (EMR)   Relationship:      Medical Records Phone Number:    None Releases:      Yes       Pt medical record was reviewed and utilized for collateral purposes of this assessment and largely agreed with the information from the patient.    ollateral Contacts      A problematic pattern of alcohol/drug use leading to clinically significant impairment or distress, as manifested by at least two of the following, occurring within a 12-month period:    Alcohol/drug is often taken in larger amounts or over a longer period than was intended.  There is a persistent desire or unsuccessful efforts to cut down or control alcohol/drug use  A great deal of time is spent in activities necessary to obtain alcohol, use alcohol, or recover from its effects.  Craving, or a strong desire or urge to use alcohol/drug  Continued alcohol use despite having persistent or recurrent social or interpersonal problems caused or exacerbated by the effects of alcohol/drug.  Important social, occupational, or recreational activities are given up or reduced because of  alcohol/drug use.  Recurrent alcohol/drug use in situations in which it is physically hazardous.  Alcohol/drug use is continued despite knowledge of having a persistent or recurrent physical or psychological problem that is likely to have been caused or exacerbated by alcohol.  Tolerance, as defined by either of the following: A need for markedly increased amounts of alcohol/drug to achieve intoxication or desired effect.  Withdrawal, as manifested by either of the following: The characteristic withdrawal syndrome for alcohol/drug (refer to Criteria A and B of the criteria set for alcohol/drug withdrawal).      Specify if: In early remission:  After full criteria for alcohol/drug use disorder were previously met, none of the criteria for alcohol/drug use disorder have been met for at least 3 months but for less than 12 months (with the exception that Criterion A4,  Craving or a strong desire or urge to use alcohol/drug  may be met).     In sustained remission:   After full criteria for alcohol use disorder were previously met, non of the criteria for alcohol/drug use disorder have been met at any time during a period of 12 months or longer (with the exception that Criterion A4,  Craving or strong desire or urge to use alcohol/drug  may be met).   Specify if:   This additional specifier is used if the individual is in an environment where access to alcohol is restricted.    Mild: Presence of 2-3 symptoms    Moderate: Presence of 4-5 symptoms    Severe: Presence of 6 or more symptoms

## 2020-04-05 PROCEDURE — 25000132 ZZH RX MED GY IP 250 OP 250 PS 637: Mod: GY | Performed by: HOSPITALIST

## 2020-04-05 PROCEDURE — 99232 SBSQ HOSP IP/OBS MODERATE 35: CPT | Performed by: INTERNAL MEDICINE

## 2020-04-05 PROCEDURE — 25000132 ZZH RX MED GY IP 250 OP 250 PS 637: Mod: GY | Performed by: PSYCHIATRY & NEUROLOGY

## 2020-04-05 PROCEDURE — 25000132 ZZH RX MED GY IP 250 OP 250 PS 637: Mod: GY | Performed by: INTERNAL MEDICINE

## 2020-04-05 PROCEDURE — 12000000 ZZH R&B MED SURG/OB

## 2020-04-05 PROCEDURE — 25000132 ZZH RX MED GY IP 250 OP 250 PS 637: Mod: GY | Performed by: STUDENT IN AN ORGANIZED HEALTH CARE EDUCATION/TRAINING PROGRAM

## 2020-04-05 RX ADMIN — MIRTAZAPINE 15 MG: 15 TABLET, FILM COATED ORAL at 21:35

## 2020-04-05 RX ADMIN — NICOTINE 1 PATCH: 14 PATCH, EXTENDED RELEASE TRANSDERMAL at 08:53

## 2020-04-05 RX ADMIN — TRAZODONE HYDROCHLORIDE 100 MG: 50 TABLET ORAL at 21:35

## 2020-04-05 RX ADMIN — QUETIAPINE 50 MG: 25 TABLET ORAL at 21:34

## 2020-04-05 RX ADMIN — LACTULOSE 10 G: 10 SOLUTION ORAL at 21:35

## 2020-04-05 RX ADMIN — PANTOPRAZOLE SODIUM 40 MG: 40 TABLET, DELAYED RELEASE ORAL at 06:59

## 2020-04-05 RX ADMIN — FUROSEMIDE 20 MG: 20 TABLET ORAL at 08:52

## 2020-04-05 RX ADMIN — SPIRONOLACTONE 25 MG: 25 TABLET ORAL at 08:52

## 2020-04-05 RX ADMIN — LACTULOSE 10 G: 10 SOLUTION ORAL at 08:52

## 2020-04-05 RX ADMIN — HYDROXYZINE HYDROCHLORIDE 25 MG: 25 TABLET, FILM COATED ORAL at 21:35

## 2020-04-05 RX ADMIN — TAMSULOSIN HYDROCHLORIDE 0.4 MG: 0.4 CAPSULE ORAL at 19:37

## 2020-04-05 RX ADMIN — HYDROXYZINE HYDROCHLORIDE 25 MG: 25 TABLET, FILM COATED ORAL at 13:51

## 2020-04-05 RX ADMIN — DULOXETINE 60 MG: 60 CAPSULE, DELAYED RELEASE ORAL at 08:52

## 2020-04-05 RX ADMIN — Medication 1 MG: at 23:45

## 2020-04-05 ASSESSMENT — ACTIVITIES OF DAILY LIVING (ADL)
ADLS_ACUITY_SCORE: 11

## 2020-04-05 NOTE — PROGRESS NOTES
Social Work Services Progress Note    Hospital Day: 29  Date of Initial Social Work Evaluation: 3/11  Collaborated with: Patient    Data: Pt is 65 year old male under a stay of commitment with [place]  Intervention: Updated Rule 25 assessment sent to Vadim at [facility] [phone], fax [phone]. Unable to identify fax for [facility], so DANIELE will fax on 4/6/20. AG signed by the pt.  Assessment: pt is alert and Ox4    Plan:    Discharge Plans in Progress: CD treatment    Barriers to d/c plan: Bed availability    Follow up Plan: SW will continue to follow for discharge planning.    JULIA Rodríguez, [facility]  Phone [phone]

## 2020-04-05 NOTE — PLAN OF CARE
Aox4, VSS on RA. Denies pain. Up independently, frequently walking in halls. Good appetite, tolerating 2 gm Na diet. Abdomen distended and firm. Pt reports feeling anxious when thinking about placement, sat with patient and encouraged him to talk about how he feels; PRN atarax given in addition to aromatherapy.  PIV SL. Will have US paracentesis tomorrow. Discharge pending placement.

## 2020-04-05 NOTE — PROGRESS NOTES
[facility]    Medicine Progress Note - Hospitalist Service       Date of Admission:  3/7/2020  Assessment & Plan   Jim Richard is a 64 year-old male with history of alcohol dependence, alcoholic cirrhosis with history of varices, depression, anxiety, coronary artery disease, obstructive sleep apnea, peripheral vascular disease, prior subdural hematoma who presents with alcohol intoxication and melena. Admitted 3/7/2020.      Gastrointestinal bleed likely secondary to esophagitis  Melena  H/o esophageal varices  Acute on chronic blood loss anemia secondary to cirrhosis and GI bleed  Presented with one week hx of melena.  Baseline hemoglobin 9-10 g/dl recent, on admission Hgb 9.4 g/dl.  Mildly tachycardic on admission otherwise blood pressure stable. Suspected slow upper GI bleed. GI ([facility]) consulted, EGD 3/7 did not show any bleeding ulcers, noted to have grade 1 esophageal varices with no signs of bleeding. Colonoscopy 3/10 with diverticulosis, 2 polyps, congested mucosa.  - Continue pantoprazole daily (indefinitely)  - Avoid anticoagulation if possible  - Hemoglobin stable (last checked 3/30)    Cirrhosis with ascites  Alcoholic hepatitis  Completed 5 day course of ceftriaxone IV for SBP prophylaxis in setting of GIB.   - Continue lactulose, goal of 2-3 loose stools daily  - Continue furosemide 20 mg daily, spironolactone 25 mg daily  - LFTs stable 3/23 (alkaline phos stably elevated). Monitor periodically.   - 2 gram sodium restricted diet  - Therapeutic paracentesis ordered for 4/6/20. Previous issues with NGUYEN with attempts to increase diuretics.      Acute kidney injury   Baseline creatinine appears to be ~0.9-1.2. elevated 3/16-17 to 1.4. BP's have been ok. No obvious nephrotoxins administered, no contrast. Possible HRS type II.  - Creatinine stable around 1.4 (4/3). Suspected this is new baseline.   - Avoid nephrotoxins   - Monitor BMP periodically     Suspected BPH with lower urinary  tract symptoms  Reports weak stream, nocturia, hesitancy. No documented history of BPH, though has previously been on tamsulosin per records.   - Continue tamsulosin (started 4/3)   - Symptoms improving     Hyponatremia, likely secondary to cirrhosis   Sodium intermittently in low 130s. Last sodium 133 (4/3)  - Monitor periodically, has been consistently in low 130s on current regimen so can reduce frequency of monitoring      Depression with history of suicidal ideation  Anxiety  PTA duloxetine 60 mg daily, quetiapine 50 mg at bedtime PRN, trazodone 200 mg at bedtime, mirtazapine 15 mg at bedtime. Poor medication compliance, had not taken meds for several weeks PTA. Psych had seen previous hospitalization.   - Psychiatry has seen, greatly appreciate assistance  - Continue duloxetine, mirtazapine, PRN trazodone, quetiapine   - Hydroxyzine PRN for anxiety     Alcohol intoxication  Alcohol dependency   Drinking up to 1.75 L vodka daily. BARNEY on admission at 0.29. Does have hx of DT's, but no h/o withdrawal seizures. Treated for withdrawal on admission.   - Currently pursuing commitment, awaiting placement     JANIS on CPAP  Uses at home, declines here. Will order at discharge     COPD  Tobacco abuse  Stable.  - Continue nicotine patch  - Patient has been declining inhalers     Thrombocytopenia   Moise of 69k 3/9, normalized 3/20. Likely due to toxicity from alcohol and consumption from bleeding given normalization.  - Monitor periodically    Diet: 2 Gram Sodium Diet  Snacks/Supplements Adult: Other; 10 am and 2pm: straw Boost  (RD); Between Meals    DVT Prophylaxis: Pneumatic Compression Devices  Leger Catheter: not present  Code Status: DNR/DNI      Disposition Plan   Expected discharge: Awaiting placement. Medically stable for discharge to treatment.   Entered: Bj Parekh MD 04/05/2020, 9:12 AM       The patient's care was discussed with the Patient.      Bj Parekh MD  Hospitalist Service  [place]  [facility]    ______________________________________________________________________    Interval History   No acute events overnight. Feels his abdominal distension has increased to the point that it is becoming uncomfortable. Denies any focal abdominal pain. Reports urinary symptoms are improving.     Data reviewed today: I reviewed all medications, new labs and imaging results over the last 24 hours. I personally reviewed no images or EKG's today.    Physical Exam   Vital Signs: Temp: 97.8  F (36.6  C) Temp src: Oral BP: 127/63   Heart Rate: 60 Resp: 16 SpO2: 95 % O2 Device: None (Room air)    Weight: 174 lbs 4.8 oz    Constitutional: NAD  Respiratory: Breathing appears comfortable at rest   Cardiovascular:  No peripheral edema.  GI: Abdomen distended, + ascites, non-tender.   Skin/Integumen: Warm, dry  Other:      Data   Recent Labs   Lab 04/03/20  0636 04/01/20  0724 03/30/20  0633   WBC  --   --  5.5   HGB  --   --  9.0*   MCV  --   --  89   PLT  --   --  231    130* 133   POTASSIUM 4.2 4.4 4.4   CHLORIDE 103 100 102   CO2 27 27 25   BUN 34* 33* 32*   CR 1.39* 1.41* 1.45*   ANIONGAP 3 3 6   KEV 9.2 9.1 8.7   GLC 83 94 83       No results found for this or any previous visit (from the past 24 hour(s)).  Medications     - MEDICATION INSTRUCTIONS -       - MEDICATION INSTRUCTIONS -         DULoxetine  60 mg Oral Daily     furosemide  20 mg Oral Daily     lactulose  10 g Oral BID     mirtazapine  15 mg Oral At Bedtime     nicotine  1 patch Transdermal Daily     nicotine   Transdermal Q8H     pantoprazole  40 mg Oral QAM AC     QUEtiapine  50 mg Oral At Bedtime     sodium chloride (PF)  3 mL Intracatheter Q8H     spironolactone  25 mg Oral Daily     tamsulosin  0.4 mg Oral QPM

## 2020-04-05 NOTE — PLAN OF CARE
A&Ox4.  VSS, RA. Denies pain.  Independent.  Tolerating 2 gram Na diet without problems.  PIV SL.  Discharge pending placement.

## 2020-04-05 NOTE — PLAN OF CARE
A&Ox4, VSS on RA. Denies pain/nausea. PRN atarax for anxiety. Up independently, ambulated halls frequently this shift. Nicotine patch to R shoulder. PIV SL. 2 gram Na diet, good appetite. Reports 2 stools today (one loose). Discharge pending placement.

## 2020-04-06 ENCOUNTER — APPOINTMENT (OUTPATIENT)
Dept: ULTRASOUND IMAGING | Facility: CLINIC | Age: 65
DRG: 392 | End: 2020-04-06
Attending: INTERNAL MEDICINE
Payer: MEDICARE

## 2020-04-06 LAB — INR PPP: 1.27 (ref 0.86–1.14)

## 2020-04-06 PROCEDURE — 85610 PROTHROMBIN TIME: CPT | Performed by: PHYSICIAN ASSISTANT

## 2020-04-06 PROCEDURE — 25000132 ZZH RX MED GY IP 250 OP 250 PS 637: Mod: GY | Performed by: HOSPITALIST

## 2020-04-06 PROCEDURE — 12000000 ZZH R&B MED SURG/OB

## 2020-04-06 PROCEDURE — 99232 SBSQ HOSP IP/OBS MODERATE 35: CPT | Performed by: STUDENT IN AN ORGANIZED HEALTH CARE EDUCATION/TRAINING PROGRAM

## 2020-04-06 PROCEDURE — 49083 ABD PARACENTESIS W/IMAGING: CPT

## 2020-04-06 PROCEDURE — 25000125 ZZHC RX 250: Performed by: RADIOLOGY

## 2020-04-06 PROCEDURE — 25000132 ZZH RX MED GY IP 250 OP 250 PS 637: Mod: GY | Performed by: INTERNAL MEDICINE

## 2020-04-06 PROCEDURE — 25000132 ZZH RX MED GY IP 250 OP 250 PS 637: Mod: GY | Performed by: PSYCHIATRY & NEUROLOGY

## 2020-04-06 PROCEDURE — 36415 COLL VENOUS BLD VENIPUNCTURE: CPT | Performed by: PHYSICIAN ASSISTANT

## 2020-04-06 PROCEDURE — 25000132 ZZH RX MED GY IP 250 OP 250 PS 637: Mod: GY | Performed by: STUDENT IN AN ORGANIZED HEALTH CARE EDUCATION/TRAINING PROGRAM

## 2020-04-06 PROCEDURE — 0W9G3ZZ DRAINAGE OF PERITONEAL CAVITY, PERCUTANEOUS APPROACH: ICD-10-PCS | Performed by: RADIOLOGY

## 2020-04-06 RX ORDER — DEXTROSE MONOHYDRATE 25 G/50ML
25-50 INJECTION, SOLUTION INTRAVENOUS
Status: DISCONTINUED | OUTPATIENT
Start: 2020-04-06 | End: 2020-04-09 | Stop reason: HOSPADM

## 2020-04-06 RX ORDER — ALBUMIN (HUMAN) 12.5 G/50ML
12.5 SOLUTION INTRAVENOUS ONCE
Status: DISCONTINUED | OUTPATIENT
Start: 2020-04-06 | End: 2020-04-06 | Stop reason: CLARIF

## 2020-04-06 RX ORDER — LIDOCAINE 40 MG/G
CREAM TOPICAL
Status: DISCONTINUED | OUTPATIENT
Start: 2020-04-06 | End: 2020-04-09 | Stop reason: HOSPADM

## 2020-04-06 RX ORDER — LIDOCAINE HYDROCHLORIDE 10 MG/ML
10 INJECTION, SOLUTION EPIDURAL; INFILTRATION; INTRACAUDAL; PERINEURAL ONCE
Status: COMPLETED | OUTPATIENT
Start: 2020-04-06 | End: 2020-04-06

## 2020-04-06 RX ORDER — NICOTINE POLACRILEX 4 MG
15-30 LOZENGE BUCCAL
Status: DISCONTINUED | OUTPATIENT
Start: 2020-04-06 | End: 2020-04-09 | Stop reason: HOSPADM

## 2020-04-06 RX ADMIN — PANTOPRAZOLE SODIUM 40 MG: 40 TABLET, DELAYED RELEASE ORAL at 07:02

## 2020-04-06 RX ADMIN — LACTULOSE 10 G: 10 SOLUTION ORAL at 08:21

## 2020-04-06 RX ADMIN — TRAZODONE HYDROCHLORIDE 100 MG: 50 TABLET ORAL at 21:32

## 2020-04-06 RX ADMIN — LIDOCAINE HYDROCHLORIDE 10 ML: 10 INJECTION, SOLUTION EPIDURAL; INFILTRATION; INTRACAUDAL; PERINEURAL at 11:04

## 2020-04-06 RX ADMIN — HYDROXYZINE HYDROCHLORIDE 25 MG: 25 TABLET, FILM COATED ORAL at 21:32

## 2020-04-06 RX ADMIN — HYDROXYZINE HYDROCHLORIDE 25 MG: 25 TABLET, FILM COATED ORAL at 15:21

## 2020-04-06 RX ADMIN — SPIRONOLACTONE 25 MG: 25 TABLET ORAL at 08:20

## 2020-04-06 RX ADMIN — MIRTAZAPINE 15 MG: 15 TABLET, FILM COATED ORAL at 21:29

## 2020-04-06 RX ADMIN — TAMSULOSIN HYDROCHLORIDE 0.4 MG: 0.4 CAPSULE ORAL at 19:57

## 2020-04-06 RX ADMIN — FUROSEMIDE 20 MG: 20 TABLET ORAL at 08:20

## 2020-04-06 RX ADMIN — DULOXETINE 60 MG: 60 CAPSULE, DELAYED RELEASE ORAL at 08:20

## 2020-04-06 RX ADMIN — NICOTINE 1 PATCH: 14 PATCH, EXTENDED RELEASE TRANSDERMAL at 08:22

## 2020-04-06 RX ADMIN — QUETIAPINE 50 MG: 25 TABLET ORAL at 21:29

## 2020-04-06 RX ADMIN — LACTULOSE 10 G: 10 SOLUTION ORAL at 19:57

## 2020-04-06 ASSESSMENT — ACTIVITIES OF DAILY LIVING (ADL)
ADLS_ACUITY_SCORE: 11

## 2020-04-06 NOTE — PLAN OF CARE
A&Ox4.  VSS, RA.  Denies pain/nausea.  Nicotine patch to L upper arm.  Independent.  PIV SL.  Abdomen distended/firm; US paracentesis this AM.  Discharge pending placement.

## 2020-04-06 NOTE — PROGRESS NOTES
[facility]    Medicine Progress Note - Hospitalist Service       Date of Admission:  3/7/2020  Date of Service: 04/06/2020    Assessment & Plan   Jim Richard is a 64 year-old male with history of alcohol dependence, alcoholic cirrhosis with history of varices, depression, anxiety, coronary artery disease, obstructive sleep apnea, peripheral vascular disease, prior subdural hematoma who presents with alcohol intoxication and melena. Admitted 3/7/2020.      Gastrointestinal bleed likely secondary to esophagitis  Melena  H/o esophageal varices  Acute on chronic blood loss anemia secondary to cirrhosis and GI bleed  Presented with one week hx of melena.  Baseline hemoglobin 9-10 g/dl recent, on admission Hgb 9.4 g/dl.  Mildly tachycardic on admission otherwise blood pressure stable. Suspected slow upper GI bleed. GI ([facility]) consulted, EGD 3/7 did not show any bleeding ulcers, noted to have grade 1 esophageal varices with no signs of bleeding. Colonoscopy 3/10 with diverticulosis, 2 polyps, congested mucosa.  - Continue pantoprazole daily (indefinitely)  - Avoid anticoagulation if possible  - Hemoglobin stable (last checked 3/30)    Cirrhosis with ascites  Alcoholic hepatitis  Completed 5 day course of ceftriaxone IV for SBP prophylaxis in setting of GIB. Therapeutic paracentesis completed 4/6/20. Previous issues with NGUYEN with attempts to increase diuretics.   Plan:  - Continue lactulose, goal of 2-3 loose stools daily  - Continue furosemide 20 mg daily, spironolactone 25 mg daily  - LFTs stable 3/23 (alkaline phos stably elevated). Monitor periodically.   - 2 gram sodium restricted diet       Acute kidney injury   Baseline creatinine appears to be ~0.9-1.2. elevated 3/16-17 to 1.4. BP's have been ok. No obvious nephrotoxins administered, no contrast. Possible HRS type II.  - Creatinine stable around 1.4 (4/3). Suspected this is new baseline.   - Avoid nephrotoxins   - Monitor BMP  periodically     Suspected BPH with lower urinary tract symptoms  Reports weak stream, nocturia, hesitancy. No documented history of BPH, though has previously been on tamsulosin per records.   - Continue tamsulosin (started 4/3)     Hyponatremia, likely secondary to cirrhosis   Sodium intermittently in low 130s. Last sodium 133 (4/3)  - Monitor periodically, has been consistently in low 130s on current regimen so can reduce frequency of monitoring      Depression with history of suicidal ideation  Anxiety  PTA duloxetine 60 mg daily, quetiapine 50 mg at bedtime PRN, trazodone 200 mg at bedtime, mirtazapine 15 mg at bedtime. Poor medication compliance, had not taken meds for several weeks PTA. Psych had seen previous hospitalization.   - Psychiatry has seen, greatly appreciate assistance  - Continue duloxetine, mirtazapine, PRN trazodone, quetiapine   - Hydroxyzine PRN for anxiety     Alcohol intoxication  Alcohol dependency   Drinking up to 1.75 L vodka daily. BARNEY on admission at 0.29. Does have hx of DT's, but no h/o withdrawal seizures. Treated for withdrawal on admission.   - Currently pursuing commitment, awaiting placement     JANIS on CPAP  Uses at home, declines here. Will order at discharge     COPD  Tobacco abuse  Stable.  - Continue nicotine patch  - Patient has been declining inhalers     Thrombocytopenia   Moise of 69k 3/9, normalized 3/20. Likely due to toxicity from alcohol and consumption from bleeding given normalization.  - Monitor periodically    Diet: 2 Gram Sodium Diet  Snacks/Supplements Adult: Other; 10 am and 2pm: straw Boost  (RD); Between Meals    DVT Prophylaxis: Pneumatic Compression Devices  Leger Catheter: not present  Code Status: DNR/DNI      Disposition Plan   Expected discharge: Awaiting placement. Medically stable for discharge to treatment. Hopefully discharge tomorrow.   Entered: Chivo Murcia MD 04/06/2020, 3:44 PM       The patient's care was discussed with the Patient.      Chivo  MD Divina  Hospitalist Service  [facility]    ______________________________________________________________________    Interval History      No acute events overnight.   Feels his abdominal distension much better after paracentesis.   Otherwise denies any focal abdominal pain. Reports urinary symptoms are improving.  No CP/SOB, no fevers or chills. No nausea/vomiting.  No new complaints.      Data reviewed today: I reviewed all medications, new labs and imaging results over the last 24 hours. I personally reviewed no images or EKG's today.    Physical Exam   Vital Signs: Temp: 97.9  F (36.6  C) Temp src: Oral BP: 117/61   Heart Rate: 61 Resp: 17 SpO2: 99 % O2 Device: None (Room air)    Weight: 181 lbs 11.2 oz    Constitutional: NAD  Respiratory: Breathing appears comfortable at rest   Cardiovascular:  No peripheral edema.  GI: Abdomen distended, + ascites, non-tender.   Skin/Integumen: Warm, dry  Other:  Normal mood and affect    Data   Recent Labs   Lab 04/06/20  0833 04/03/20  0636 04/01/20  0724   INR 1.27*  --   --    NA  --  133 130*   POTASSIUM  --  4.2 4.4   CHLORIDE  --  103 100   CO2  --  27 27   BUN  --  34* 33*   CR  --  1.39* 1.41*   ANIONGAP  --  3 3   KEV  --  9.2 9.1   GLC  --  83 94       Recent Results (from the past 24 hour(s))   US Paracentesis    Narrative    US PARACENTESIS 4/6/2020 11:33 AM     HISTORY: HIGH VOLUME paracentesis with or without diagnostic fluid  analysis with labs to be drawn if ordered. Total paracentesis volume  as much as possible.    FINDINGS: Limited preprocedure ultrasound was performed, images show a  sufficient amount of ascites for paracentesis. An image was archived.  Written and oral informed consent was obtained. A pause for the cause  procedure to verify the correct patient and correct procedure. The  skin overlying the right lower quadrant was prepped and draped in the  usual sterile fashion. The subcutaneous tissues were anesthetized with  10mL  1% lidocaine. Under direct ultrasound guidance the catheter was  advanced into the peritoneal space and 3.5 L of  straw colored fluid  was drained. The catheter was removed and a sterile dressing was  applied. There were no immediate complications. Ultrasound images were  permanently stored.  Patient left the ultrasound suite in satisfactory  condition.      Impression    IMPRESSION: Technically successful paracentesis without immediate  complications.    JAVIER JUDD DO     Medications     - MEDICATION INSTRUCTIONS -       - MEDICATION INSTRUCTIONS -       - MEDICATION INSTRUCTIONS -         DULoxetine  60 mg Oral Daily     furosemide  20 mg Oral Daily     lactulose  10 g Oral BID     lidocaine (buffered or not buffered)  5 mL Intradermal Once     mirtazapine  15 mg Oral At Bedtime     nicotine  1 patch Transdermal Daily     nicotine   Transdermal Q8H     pantoprazole  40 mg Oral QAM AC     QUEtiapine  50 mg Oral At Bedtime     sodium chloride (PF)  3 mL Intracatheter Q8H     sodium chloride (PF)  3 mL Intracatheter Q8H     spironolactone  25 mg Oral Daily     tamsulosin  0.4 mg Oral QPM

## 2020-04-06 NOTE — PROGRESS NOTES
Care Suites Procedure Nursing Note    Patient Information  Name: Jim Richard  Age: 65 year old    Procedure  Paracentesis: patient arrived to department via cart. Ultrasound tech performed preliminary scan to find pockets of fluid. MD arrived to explain procedure, obtained consent. Time out was then done. Procedure begun, no issues, drainage in process. Patient was monitored with BP and O2 sats. Patient tolerated well. VSS. 3500 cc yellow fluid removed from abdomen w/o difficulty. Bandaid applied to site.  Procedure start time: 1055  Procedure complete time: 1120  Concerns/abnormal assessment: No  If abnormal assessment, provider notified: N/A  Plan/Other: Pt back to 824-1 per cart & transport aid.    Alis Ramos RN

## 2020-04-06 NOTE — PLAN OF CARE
A&Ox4. VSS on RA. Denies pain/nausea. PRN atarax for anxiety. Nicotine patch to L shoulder. Ambulated halls frequently. PIV SL. Abdomen firm & distended, plan to have US paracentesis tomorrow morning. Discharge pending placement.

## 2020-04-06 NOTE — PROGRESS NOTES
DANIELE Note:    D/I:  DANIELE is following for discharge planning.  DANIELE sent updated Rule 25 assessment to [facility] and [facility] for review.  SW also received Commitment paperwork and faxed to facilities per  request as she was having difficulty with her faxes.      DANIELE received call from Vadim Solis who stated that he had reviewed patient, but was waiting for facility nurse to review for medical clearance.  He will update DANIELE when a decision has been made.    P: DANIELE will continue to assist and follow for discharge.     JULIA Navarro, LGSW  [phone]  [facility]

## 2020-04-06 NOTE — PROCEDURES
[facility]    Procedure: Paracentesis    Date/Time: 4/6/2020 11:07 AM  Performed by: Adriana Orellana DO  Authorized by: Adriana Orellana DO     UNIVERSAL PROTOCOL   Site Marked: Yes  Prior Images Obtained and Reviewed:  Yes  Required items: Required blood products, implants, devices and special equipment available    Patient identity confirmed:  Verbally with patient, arm band, provided demographic data and hospital-assigned identification number  Patient was reevaluated immediately before administering moderate or deep sedation or anesthesia  Confirmation Checklist:  Patient's identity using two indicators, relevant allergies, procedure was appropriate and matched the consent or emergent situation and correct equipment/implants were available  Time out: Immediately prior to the procedure a time out was called    Universal Protocol: the Joint Commission Universal Protocol was followed    Preparation: Patient was prepped and draped in usual sterile fashion           ANESTHESIA    Anesthesia: Local infiltration  Local Anesthetic:  Lidocaine 1% without epinephrine      SEDATION    Patient Sedated: No    See dictated procedure note for full details.  Findings: paracentesis    Specimens: none    Complications: None    Condition: Stable    Plan: Repeat as needed.     PROCEDURE   Patient Tolerance:  Patient tolerated the procedure well with no immediate complications    Length of time physician/provider present for 1:1 monitoring during sedation: 0

## 2020-04-07 PROCEDURE — 40000863 ZZH STATISTIC RADIOLOGY XRAY, US, CT, MAR, NM

## 2020-04-07 PROCEDURE — 25000132 ZZH RX MED GY IP 250 OP 250 PS 637: Mod: GY | Performed by: INTERNAL MEDICINE

## 2020-04-07 PROCEDURE — 25000132 ZZH RX MED GY IP 250 OP 250 PS 637: Mod: GY | Performed by: HOSPITALIST

## 2020-04-07 PROCEDURE — 99232 SBSQ HOSP IP/OBS MODERATE 35: CPT | Performed by: STUDENT IN AN ORGANIZED HEALTH CARE EDUCATION/TRAINING PROGRAM

## 2020-04-07 PROCEDURE — 25000132 ZZH RX MED GY IP 250 OP 250 PS 637: Mod: GY | Performed by: PSYCHIATRY & NEUROLOGY

## 2020-04-07 PROCEDURE — 12000000 ZZH R&B MED SURG/OB

## 2020-04-07 PROCEDURE — 99207 ZZC CDG-MDM COMPONENT: MEETS MODERATE - UP CODED: CPT | Performed by: STUDENT IN AN ORGANIZED HEALTH CARE EDUCATION/TRAINING PROGRAM

## 2020-04-07 PROCEDURE — 25000132 ZZH RX MED GY IP 250 OP 250 PS 637: Mod: GY | Performed by: STUDENT IN AN ORGANIZED HEALTH CARE EDUCATION/TRAINING PROGRAM

## 2020-04-07 RX ADMIN — DULOXETINE 60 MG: 60 CAPSULE, DELAYED RELEASE ORAL at 08:22

## 2020-04-07 RX ADMIN — MIRTAZAPINE 15 MG: 15 TABLET, FILM COATED ORAL at 21:33

## 2020-04-07 RX ADMIN — FUROSEMIDE 20 MG: 20 TABLET ORAL at 08:22

## 2020-04-07 RX ADMIN — ACETAMINOPHEN 650 MG: 325 TABLET, FILM COATED ORAL at 03:24

## 2020-04-07 RX ADMIN — LACTULOSE 10 G: 10 SOLUTION ORAL at 08:21

## 2020-04-07 RX ADMIN — TRAZODONE HYDROCHLORIDE 100 MG: 50 TABLET ORAL at 21:33

## 2020-04-07 RX ADMIN — LACTULOSE 10 G: 10 SOLUTION ORAL at 21:32

## 2020-04-07 RX ADMIN — PANTOPRAZOLE SODIUM 40 MG: 40 TABLET, DELAYED RELEASE ORAL at 06:27

## 2020-04-07 RX ADMIN — HYDROXYZINE HYDROCHLORIDE 25 MG: 25 TABLET, FILM COATED ORAL at 21:33

## 2020-04-07 RX ADMIN — HYDROXYZINE HYDROCHLORIDE 25 MG: 25 TABLET, FILM COATED ORAL at 08:25

## 2020-04-07 RX ADMIN — QUETIAPINE 50 MG: 25 TABLET ORAL at 21:33

## 2020-04-07 RX ADMIN — TAMSULOSIN HYDROCHLORIDE 0.4 MG: 0.4 CAPSULE ORAL at 20:22

## 2020-04-07 RX ADMIN — NICOTINE 1 PATCH: 14 PATCH, EXTENDED RELEASE TRANSDERMAL at 08:22

## 2020-04-07 RX ADMIN — SPIRONOLACTONE 25 MG: 25 TABLET ORAL at 08:22

## 2020-04-07 RX ADMIN — HYDROXYZINE HYDROCHLORIDE 25 MG: 25 TABLET, FILM COATED ORAL at 15:04

## 2020-04-07 ASSESSMENT — ACTIVITIES OF DAILY LIVING (ADL)
ADLS_ACUITY_SCORE: 11

## 2020-04-07 NOTE — PROGRESS NOTES
[facility]    Medicine Progress Note - Hospitalist Service       Date of Admission:  3/7/2020  Date of Service: 04/07/2020    Assessment & Plan      Jim Richard is a 64 year-old male with history of alcohol dependence, alcoholic cirrhosis with history of varices, depression, anxiety, coronary artery disease, obstructive sleep apnea, peripheral vascular disease, prior subdural hematoma who presents with alcohol intoxication and melena. Admitted 3/7/2020.      Gastrointestinal bleed likely secondary to esophagitis  Melena  H/o esophageal varices  Acute on chronic blood loss anemia secondary to cirrhosis and GI bleed  Presented with one week hx of melena.  Baseline hemoglobin 9-10 g/dl recent, on admission Hgb 9.4 g/dl.  Mildly tachycardic on admission otherwise blood pressure stable. Suspected slow upper GI bleed. GI ([facility]) consulted, EGD 3/7 did not show any bleeding ulcers, noted to have grade 1 esophageal varices with no signs of bleeding. Colonoscopy 3/10 with diverticulosis, 2 polyps, congested mucosa.  Plan:  - Continue pantoprazole daily (indefinitely)  - Avoid anticoagulation if possible  - Hemoglobin stable (last checked 3/30)    Cirrhosis with ascites  Alcoholic hepatitis  Completed 5 day course of ceftriaxone IV for SBP prophylaxis in setting of GIB. Therapeutic paracentesis completed 4/6/20. Previous issues with NGUYEN with attempts to increase diuretics.   Plan:  - Continue lactulose, goal of 2-3 loose stools daily  - Continue furosemide 20 mg daily, spironolactone 25 mg daily  - LFTs stable 3/23 (alkaline phos stably elevated). Monitor periodically.   - 2 gram sodium restricted diet       Acute kidney injury   Baseline creatinine appears to be ~0.9-1.2. elevated 3/16-17 to 1.4. BP's have been ok. No obvious nephrotoxins administered, no contrast. Possible HRS type II.  - Creatinine stable around 1.4 (4/3). Suspected this is new baseline.   - Avoid nephrotoxins   - Monitor BMP  periodically     Suspected BPH with lower urinary tract symptoms  Reports weak stream, nocturia, hesitancy. No documented history of BPH, though has previously been on tamsulosin per records.   - Continue tamsulosin (started 4/3)     Hyponatremia, likely secondary to cirrhosis   Sodium intermittently in low 130s. Last sodium 133 (4/3)  - Monitor periodically, has been consistently in low 130s on current regimen so can reduce frequency of monitoring      Depression with history of suicidal ideation  Anxiety  PTA duloxetine 60 mg daily, quetiapine 50 mg at bedtime PRN, trazodone 200 mg at bedtime, mirtazapine 15 mg at bedtime. Poor medication compliance, had not taken meds for several weeks PTA. Psych had seen previous hospitalization.   - Psychiatry has seen, greatly appreciate assistance  - Continue duloxetine, mirtazapine, PRN trazodone, quetiapine   - Hydroxyzine PRN for anxiety     Alcohol intoxication  Alcohol dependency   Drinking up to 1.75 L vodka daily. BARNEY on admission at 0.29. Does have hx of DT's, but no h/o withdrawal seizures. Treated for withdrawal on admission.   - Currently pursuing commitment, awaiting placement     JANIS on CPAP  Uses at home, declines here. Will order at discharge     COPD  Tobacco abuse  Stable.  - Continue nicotine patch  - Patient has been declining inhalers     Thrombocytopenia   Moise of 69k 3/9, normalized 3/20. Likely due to toxicity from alcohol and consumption from bleeding given normalization.  - Monitor periodically    Diet: 2 Gram Sodium Diet  Snacks/Supplements Adult: Other; 10 am and 2pm: straw Boost  (RD); Between Meals    DVT Prophylaxis: Pneumatic Compression Devices  Leger Catheter: not present  Code Status: DNR/DNI      Disposition Plan   Expected discharge: Awaiting placement. Medically stable for discharge to treatment. Hopefully discharge tomorrow.   Entered: Chivo Murcia MD 04/07/2020, 2:43 PM       The patient's care was discussed with the Patient.      Chivo  MD Divina  Hospitalist Service  [facility]    ______________________________________________________________________    Interval History      No acute events overnight.   Abdominal distension stable.   Otherwise denies any new focal abdominal pain. No new urinary complaints.  No CP/SOB, no fevers or chills. No nausea/vomiting.  No new complaints today.      Data reviewed today: I reviewed all medications, new labs and imaging results over the last 24 hours. I personally reviewed no images or EKG's today.    Physical Exam   Vital Signs: Temp: 97.7  F (36.5  C) Temp src: Oral BP: 126/63   Heart Rate: 61 Resp: 16 SpO2: 98 % O2 Device: None (Room air)    Weight: 177 lbs 12.8 oz    Constitutional: NAD  Respiratory: Breathing appears comfortable at rest   Cardiovascular:  No peripheral edema.  GI: Abdomen distended, + ascites, non-tender.   Skin/Integumen: Warm, dry  Other:  Normal mood and affect    Data   Recent Labs   Lab 04/06/20  0833 04/03/20  0636 04/01/20  0724   INR 1.27*  --   --    NA  --  133 130*   POTASSIUM  --  4.2 4.4   CHLORIDE  --  103 100   CO2  --  27 27   BUN  --  34* 33*   CR  --  1.39* 1.41*   ANIONGAP  --  3 3   KEV  --  9.2 9.1   GLC  --  83 94       No results found for this or any previous visit (from the past 24 hour(s)).  Medications     - MEDICATION INSTRUCTIONS -       - MEDICATION INSTRUCTIONS -       - MEDICATION INSTRUCTIONS -         DULoxetine  60 mg Oral Daily     furosemide  20 mg Oral Daily     lactulose  10 g Oral BID     lidocaine (buffered or not buffered)  5 mL Intradermal Once     mirtazapine  15 mg Oral At Bedtime     nicotine  1 patch Transdermal Daily     nicotine   Transdermal Q8H     pantoprazole  40 mg Oral QAM AC     QUEtiapine  50 mg Oral At Bedtime     sodium chloride (PF)  3 mL Intracatheter Q8H     spironolactone  25 mg Oral Daily     tamsulosin  0.4 mg Oral QPM

## 2020-04-07 NOTE — PROGRESS NOTES
"CLINICAL NUTRITION SERVICES - REASSESSMENT NOTE      Malnutrition:  (3/9)  % Weight Loss: unable to assess (last recorded wt was 2/25)  % Intake:  No decreased intake noted (pt denies any decreased po intake)  Subcutaneous Fat Loss:  None observed  Muscle Loss:  Temporal region - mild depletion  Fluid Retention: trace     Malnutrition Diagnosis: Patient does not meet two of the above criteria necessary for diagnosing malnutrition, however, suspect some risk of malnutrition 2' to ETOH and recent diarrhea/GIB       EVALUATION OF PROGRESS TOWARD GOALS   Diet:  2 gm Na + Strawberry Boost Plus BID between meals    Intake/Tolerance:   - Pt has been consuming 100% meals per nursing flow sheets.    - Spoke with pt via phone who confirmed this.  He continues to enjoy the food the the supplement, stating it's \"his treat.\"      NEW FINDINGS:   Wt:  80.6 kg (stable from admit wt).    4/6:  Paracentesis = 3500 mL     Pt continues to await placement for discharge.    Previous Goals (3/31):   Patient will continue to consume at least % of meals and supplements    Evaluation: Met    Previous Nutrition Diagnosis:   No nutrition diagnosis identified at this time  Evaluation: No change      CURRENT NUTRITION DIAGNOSIS  No nutrition diagnosis identified at this time       INTERVENTIONS  Recommendations / Nutrition Prescription  Continue 2 gram Na diet + Boost BID between meals     Implementation  None    Goals  Patient will continue to consume at least % of meals and supplements   Weight will remain stable at ~ 80 kg      MONITORING AND EVALUATION:  Progress towards goals will be monitored and evaluated per protocol and Practice Guidelines    Renetta Bob, RD, LD, CNSC  "

## 2020-04-07 NOTE — PROGRESS NOTES
"DANIELE Note:    D/I:  DANIELE received call from Vadim with [facility] who stated that they have reviewed patient and would be able to add him to their list, but at this time, it will probably be a \"couple of weeks\" before they would have an opening for patient.      DANIELE received VM from Sara with [facility] who stated that they would tentatively have a bed available for patient on 4/8 or 4/9.  SW returned call to discuss patient update and left VM.     DANIELE updated patient and  Omayra about potential discharge this week.    P: DANIELE will continue to assist and follow for discharge.    JULIA Navarro, LGSW  [phone]  [facility]       "

## 2020-04-07 NOTE — PLAN OF CARE
Patient is A&Ox4. VSS ex soft BPs. C/o pain, gave Tylenol x1. R PIV SL. Up independently to bathroom. Nicotine patch to right arm. Paracentesis with right bandage, CDI. Gave atarax and trazadone at bedtime for sleep. Calls appropriately.

## 2020-04-08 PROCEDURE — 25000132 ZZH RX MED GY IP 250 OP 250 PS 637: Mod: GY | Performed by: PSYCHIATRY & NEUROLOGY

## 2020-04-08 PROCEDURE — 25000132 ZZH RX MED GY IP 250 OP 250 PS 637: Mod: GY | Performed by: HOSPITALIST

## 2020-04-08 PROCEDURE — 12000000 ZZH R&B MED SURG/OB

## 2020-04-08 PROCEDURE — 25000132 ZZH RX MED GY IP 250 OP 250 PS 637: Mod: GY | Performed by: INTERNAL MEDICINE

## 2020-04-08 PROCEDURE — 25000132 ZZH RX MED GY IP 250 OP 250 PS 637: Mod: GY | Performed by: STUDENT IN AN ORGANIZED HEALTH CARE EDUCATION/TRAINING PROGRAM

## 2020-04-08 PROCEDURE — 99231 SBSQ HOSP IP/OBS SF/LOW 25: CPT | Performed by: STUDENT IN AN ORGANIZED HEALTH CARE EDUCATION/TRAINING PROGRAM

## 2020-04-08 RX ORDER — FUROSEMIDE 20 MG
20 TABLET ORAL DAILY
Qty: 30 TABLET | Refills: 0 | Status: ON HOLD | OUTPATIENT
Start: 2020-04-09 | End: 2020-05-04

## 2020-04-08 RX ORDER — MIRTAZAPINE 15 MG/1
15 TABLET, FILM COATED ORAL AT BEDTIME
Qty: 30 TABLET | Refills: 0 | Status: ON HOLD | OUTPATIENT
Start: 2020-04-08 | End: 2020-05-04

## 2020-04-08 RX ORDER — DULOXETIN HYDROCHLORIDE 60 MG/1
60 CAPSULE, DELAYED RELEASE ORAL DAILY
Qty: 30 CAPSULE | Refills: 0 | Status: SHIPPED | OUTPATIENT
Start: 2020-04-08 | End: 2020-05-01

## 2020-04-08 RX ORDER — HYDROXYZINE HYDROCHLORIDE 25 MG/1
25 TABLET, FILM COATED ORAL EVERY 6 HOURS PRN
Qty: 30 TABLET | Refills: 0 | Status: ON HOLD | OUTPATIENT
Start: 2020-04-08 | End: 2020-05-04

## 2020-04-08 RX ORDER — LACTULOSE 10 G/15ML
10 SOLUTION ORAL 2 TIMES DAILY
Qty: 946 ML | Refills: 0 | Status: SHIPPED | OUTPATIENT
Start: 2020-04-08 | End: 2020-05-01

## 2020-04-08 RX ORDER — QUETIAPINE FUMARATE 50 MG/1
50 TABLET, FILM COATED ORAL
Qty: 30 TABLET | Refills: 0 | Status: ON HOLD | OUTPATIENT
Start: 2020-04-08 | End: 2020-05-04

## 2020-04-08 RX ORDER — FOLIC ACID 1 MG/1
1 TABLET ORAL DAILY
Qty: 30 TABLET | Refills: 0 | Status: ON HOLD | OUTPATIENT
Start: 2020-04-08 | End: 2020-05-04

## 2020-04-08 RX ORDER — MULTIPLE VITAMINS W/ MINERALS TAB 9MG-400MCG
1 TAB ORAL DAILY
Qty: 30 EACH | Refills: 0 | Status: ON HOLD | OUTPATIENT
Start: 2020-04-08 | End: 2020-07-01

## 2020-04-08 RX ORDER — TAMSULOSIN HYDROCHLORIDE 0.4 MG/1
0.4 CAPSULE ORAL EVERY EVENING
Qty: 30 CAPSULE | Refills: 0 | Status: ON HOLD | OUTPATIENT
Start: 2020-04-08 | End: 2020-05-04

## 2020-04-08 RX ORDER — TRAZODONE HYDROCHLORIDE 100 MG/1
100 TABLET ORAL
Qty: 30 TABLET | Refills: 0 | Status: ON HOLD | OUTPATIENT
Start: 2020-04-08 | End: 2020-05-04

## 2020-04-08 RX ORDER — SPIRONOLACTONE 25 MG/1
25 TABLET ORAL DAILY
Qty: 30 TABLET | Refills: 0 | Status: ON HOLD | OUTPATIENT
Start: 2020-04-09 | End: 2020-05-04

## 2020-04-08 RX ADMIN — LACTULOSE 10 G: 10 SOLUTION ORAL at 21:48

## 2020-04-08 RX ADMIN — TAMSULOSIN HYDROCHLORIDE 0.4 MG: 0.4 CAPSULE ORAL at 20:37

## 2020-04-08 RX ADMIN — MIRTAZAPINE 15 MG: 15 TABLET, FILM COATED ORAL at 21:48

## 2020-04-08 RX ADMIN — NICOTINE 1 PATCH: 14 PATCH, EXTENDED RELEASE TRANSDERMAL at 09:28

## 2020-04-08 RX ADMIN — HYDROXYZINE HYDROCHLORIDE 25 MG: 25 TABLET, FILM COATED ORAL at 10:33

## 2020-04-08 RX ADMIN — QUETIAPINE 50 MG: 25 TABLET ORAL at 21:45

## 2020-04-08 RX ADMIN — HYDROXYZINE HYDROCHLORIDE 25 MG: 25 TABLET, FILM COATED ORAL at 21:48

## 2020-04-08 RX ADMIN — LACTULOSE 10 G: 10 SOLUTION ORAL at 09:28

## 2020-04-08 RX ADMIN — TRAZODONE HYDROCHLORIDE 100 MG: 50 TABLET ORAL at 21:45

## 2020-04-08 RX ADMIN — PANTOPRAZOLE SODIUM 40 MG: 40 TABLET, DELAYED RELEASE ORAL at 06:43

## 2020-04-08 RX ADMIN — HYDROXYZINE HYDROCHLORIDE 25 MG: 25 TABLET, FILM COATED ORAL at 16:03

## 2020-04-08 RX ADMIN — SPIRONOLACTONE 25 MG: 25 TABLET ORAL at 09:28

## 2020-04-08 RX ADMIN — DULOXETINE 60 MG: 60 CAPSULE, DELAYED RELEASE ORAL at 09:28

## 2020-04-08 RX ADMIN — FUROSEMIDE 20 MG: 20 TABLET ORAL at 09:28

## 2020-04-08 ASSESSMENT — ACTIVITIES OF DAILY LIVING (ADL)
ADLS_ACUITY_SCORE: 11

## 2020-04-08 NOTE — PROGRESS NOTES
[facility]    Medicine Progress Note - Hospitalist Service       Date of Admission:  3/7/2020  Date of Service: 04/08/2020    Assessment & Plan      Jim Richard is a 64 year-old male with history of alcohol dependence, alcoholic cirrhosis with history of varices, depression, anxiety, coronary artery disease, obstructive sleep apnea, peripheral vascular disease, prior subdural hematoma who presents with alcohol intoxication and melena. Admitted 3/7/2020.      Gastrointestinal bleed likely secondary to esophagitis  Melena  H/o esophageal varices  Acute on chronic blood loss anemia secondary to cirrhosis and GI bleed  Presented with one week hx of melena.  Baseline hemoglobin 9-10 g/dl recent, on admission Hgb 9.4 g/dl.  Mildly tachycardic on admission otherwise blood pressure stable. Suspected slow upper GI bleed. GI ([facility]) consulted, EGD 3/7 did not show any bleeding ulcers, noted to have grade 1 esophageal varices with no signs of bleeding. Colonoscopy 3/10 with diverticulosis, 2 polyps, congested mucosa.  Plan:  - Continue pantoprazole daily (indefinitely)  - Avoid anticoagulation if possible  - Hemoglobin stable (last checked 3/30)    Cirrhosis with ascites  Alcoholic hepatitis  Completed 5 day course of ceftriaxone IV for SBP prophylaxis in setting of GIB. Therapeutic paracentesis completed 4/6/20. Previous issues with NGUYEN with attempts to increase diuretics.   Plan:  - Continue lactulose, goal of 2-3 loose stools daily  - Continue furosemide 20 mg daily, spironolactone 25 mg daily  - LFTs stable 3/23 (alkaline phos stably elevated). Monitor periodically.   - 2 gram sodium restricted diet       Acute kidney injury   Baseline creatinine appears to be ~0.9-1.2. elevated 3/16-17 to 1.4. BP's have been ok. No obvious nephrotoxins administered, no contrast. Possible HRS type II.  - Creatinine stable around 1.4 (4/3). Suspected this is new baseline.   - Avoid nephrotoxins   - Monitor BMP  periodically     Suspected BPH with lower urinary tract symptoms  Reports weak stream, nocturia, hesitancy. No documented history of BPH, though has previously been on tamsulosin per records.   - Continue tamsulosin (started 4/3)     Hyponatremia, likely secondary to cirrhosis   Sodium intermittently in low 130s. Last sodium 133 (4/3)  - Monitor periodically, as outpatient     Depression with history of suicidal ideation  Anxiety  PTA duloxetine 60 mg daily, quetiapine 50 mg at bedtime PRN, trazodone 200 mg at bedtime, mirtazapine 15 mg at bedtime. Poor medication compliance, had not taken meds for several weeks PTA. Psych had seen previous hospitalization.   - Psychiatry has seen, greatly appreciate assistance  - Continue duloxetine, mirtazapine, PRN trazodone, quetiapine   - Hydroxyzine PRN for anxiety     Alcohol intoxication  Alcohol dependency   Drinking up to 1.75 L vodka daily. BARNEY on admission at 0.29. Does have hx of DT's, but no h/o withdrawal seizures. Treated for withdrawal on admission.   - Currently pursuing commitment, awaiting placement     JANIS on CPAP  Uses at home, declines here. Will order at discharge     COPD  Tobacco abuse  Stable.  - Continue nicotine patch  - Patient has been declining inhalers     Thrombocytopenia   Miose of 69k 3/9, normalized 3/20. Likely due to toxicity from alcohol and consumption from bleeding given normalization.  - Monitor periodically    Diet: 2 Gram Sodium Diet  Snacks/Supplements Adult: Other; 10 am and 2pm: straw Boost  (RD); Between Meals    DVT Prophylaxis: Pneumatic Compression Devices  Leger Catheter: not present  Code Status: DNR/DNI      Disposition Plan   Expected discharge: Awaiting placement. Medically stable for discharge to treatment. Hopefully discharge tomorrow.   Entered: Chivo Murcia MD 04/08/2020, 1:17 PM       The patient's care was discussed with the Patient.      Chivo Murcia MD  Hospitalist Service  [facility]    ______________________________________________________________________    Interval History      No acute events overnight.   Abdominal distension stable.   No new urinary complaints.  No CP/SOB, no fevers or chills. No nausea/vomiting.  No new complaints today.  Discharge planned for tomorrow.     Data reviewed today: I reviewed all medications, new labs and imaging results over the last 24 hours. I personally reviewed no images or EKG's today.    Physical Exam   Vital Signs: Temp: 98.2  F (36.8  C) Temp src: Oral BP: 121/51   Heart Rate: 59 Resp: 16 SpO2: 98 % O2 Device: None (Room air)    Weight: 177 lbs 14.4 oz    Constitutional: NAD  Respiratory: Breathing appears comfortable at rest   Cardiovascular:  No peripheral edema.  GI: Abdomen distended, + ascites, non-tender.   Skin/Integumen: Warm, dry  Other:  Normal mood and affect    Data   Recent Labs   Lab 04/06/20  0833 04/03/20  0636   INR 1.27*  --    NA  --  133   POTASSIUM  --  4.2   CHLORIDE  --  103   CO2  --  27   BUN  --  34*   CR  --  1.39*   ANIONGAP  --  3   KEV  --  9.2   GLC  --  83       No results found for this or any previous visit (from the past 24 hour(s)).  Medications     - MEDICATION INSTRUCTIONS -       - MEDICATION INSTRUCTIONS -       - MEDICATION INSTRUCTIONS -         DULoxetine  60 mg Oral Daily     furosemide  20 mg Oral Daily     lactulose  10 g Oral BID     lidocaine (buffered or not buffered)  5 mL Intradermal Once     mirtazapine  15 mg Oral At Bedtime     nicotine  1 patch Transdermal Daily     nicotine   Transdermal Q8H     pantoprazole  40 mg Oral QAM AC     QUEtiapine  50 mg Oral At Bedtime     sodium chloride (PF)  3 mL Intracatheter Q8H     spironolactone  25 mg Oral Daily     tamsulosin  0.4 mg Oral QPM

## 2020-04-08 NOTE — PROGRESS NOTES
"DANIELE Note:    D/I:  DANIELE received call from [place] with [facility] and they are able to accept patient to their facility tomorrow 4/9/20.  Patient will have an intake assessment for 10:00 am.  DANIELE met with patient and he is requesting transportation to be arranged.  DANIELE placed call to [facility] to arrange for a wheelchair ride for patient at 9:15 on 4/9/20.  Updated patient and he is in agreement with discharge plan.  DANIELE confirmed with Sara that ride is established for 9:15 on 4/9/20.      Intake Date: Thursday, April 9, 2020  Transport Time: 9:15 am  Intake Time: 10:00 am  Location: [address] [place] [address]  Phone: [phone]    *30-day supply of medications/supplies, if possible (OTC medications, supplements, protein powders not allowed)    Notice from Facility:  \"Be advised that due to the current concerns regarding COVID-19, if you are experiencing any of the below symptoms, we will be unable to complete your intake. Please call us to advise so we can assist with getting your intake rescheduled. Please also refrain from taking any tylenol or ibuprofen prior to your appointment.\"  *Sore Throat  *Cough  *Fever  *Difficulty Breathing      DANIELE placed call to [facility]  Omayra ([phone]) and updated regarding discharge plan for patient.  She will continue to follow patient until his commitment has ended.      P: DANIELE will continue to assist and follow for discharge.     JULIA Navarro, LGSW  [phone]  [facility]       "

## 2020-04-08 NOTE — PLAN OF CARE
Pt is A&Ox4. VSS. Denies pain. Up ind, ambulated in halls. PRN atarax given x1. Plan pending inpt chem dep placement- possibly [facility] this week.

## 2020-04-08 NOTE — PLAN OF CARE
Pt A/Ox4.  Vitally stable.  Up ind.  Took atarax x2 for anxiety.  Awaiting inpt chem dep placement.

## 2020-04-08 NOTE — PLAN OF CARE
Pt A/Ox4. VSS. Denies pain. Up ind, ambulated in halls. PRN atarax given x2. Discharging tomorrow morning @ 9:15am [facility].

## 2020-04-09 VITALS
WEIGHT: 181 LBS | SYSTOLIC BLOOD PRESSURE: 127 MMHG | DIASTOLIC BLOOD PRESSURE: 64 MMHG | RESPIRATION RATE: 16 BRPM | OXYGEN SATURATION: 96 % | HEART RATE: 60 BPM | TEMPERATURE: 98.4 F | BODY MASS INDEX: 28.35 KG/M2

## 2020-04-09 PROCEDURE — 25000132 ZZH RX MED GY IP 250 OP 250 PS 637: Mod: GY | Performed by: STUDENT IN AN ORGANIZED HEALTH CARE EDUCATION/TRAINING PROGRAM

## 2020-04-09 PROCEDURE — 99239 HOSP IP/OBS DSCHRG MGMT >30: CPT | Performed by: STUDENT IN AN ORGANIZED HEALTH CARE EDUCATION/TRAINING PROGRAM

## 2020-04-09 PROCEDURE — 25000132 ZZH RX MED GY IP 250 OP 250 PS 637: Mod: GY | Performed by: HOSPITALIST

## 2020-04-09 PROCEDURE — 25000132 ZZH RX MED GY IP 250 OP 250 PS 637: Mod: GY | Performed by: INTERNAL MEDICINE

## 2020-04-09 RX ADMIN — SPIRONOLACTONE 25 MG: 25 TABLET ORAL at 08:07

## 2020-04-09 RX ADMIN — HYDROXYZINE HYDROCHLORIDE 25 MG: 25 TABLET, FILM COATED ORAL at 08:07

## 2020-04-09 RX ADMIN — FUROSEMIDE 20 MG: 20 TABLET ORAL at 08:07

## 2020-04-09 RX ADMIN — NICOTINE 1 PATCH: 14 PATCH, EXTENDED RELEASE TRANSDERMAL at 08:08

## 2020-04-09 RX ADMIN — DULOXETINE 60 MG: 60 CAPSULE, DELAYED RELEASE ORAL at 08:07

## 2020-04-09 RX ADMIN — PANTOPRAZOLE SODIUM 40 MG: 40 TABLET, DELAYED RELEASE ORAL at 06:49

## 2020-04-09 RX ADMIN — LACTULOSE 10 G: 10 SOLUTION ORAL at 08:07

## 2020-04-09 ASSESSMENT — ACTIVITIES OF DAILY LIVING (ADL)
ADLS_ACUITY_SCORE: 11

## 2020-04-09 NOTE — PLAN OF CARE
Pt is A&Ox4. VSS. Denies pain. Up ind. 2g Na diet. PRN atarax given x1. Plan to discharge this AM to [facility] @ 9:15 am to inpatient rehab.

## 2020-04-09 NOTE — DISCHARGE SUMMARY
[facility]  Hospitalist Discharge Summary      Date of Admission:  3/7/2020  Date of Discharge:  4/9/2020  Discharging Provider: Chivo Murcia MD      Discharge Diagnoses     Gastrointestinal bleed likely secondary to esophagitis  Melena  H/o esophageal varices  Acute on chronic blood loss anemia secondary to cirrhosis and GI bleed    Follow-ups Needed After Discharge   Follow-up Appointments     Follow-up and recommended labs and tests       Follow up with primary care provider, FERNANDA BRANTLEY, within 7 days   for hospital follow- up.  The following labs/tests are recommended: CBC.             Unresulted Labs Ordered in the Past 30 Days of this Admission     No orders found from 2/6/2020 to 3/8/2020.        Discharge Disposition   Discharged to home  Condition at discharge: Stable    Hospital Course   Jim Richard is a 64 year-old male with history of alcohol dependence, alcoholic cirrhosis with history of varices, depression, anxiety, coronary artery disease, obstructive sleep apnea, peripheral vascular disease, prior subdural hematoma who presents with alcohol intoxication and melena. Admitted 3/7/2020.      Gastrointestinal bleed likely secondary to esophagitis  Melena  H/o esophageal varices  Acute on chronic blood loss anemia secondary to cirrhosis and GI bleed  Presented with one week hx of melena.  Baseline hemoglobin 9-10 g/dl recent, on admission Hgb 9.4 g/dl.  Mildly tachycardic on admission otherwise blood pressure stable. Suspected slow upper GI bleed. GI ([facility]) consulted, EGD 3/7 did not show any bleeding ulcers, noted to have grade 1 esophageal varices with no signs of bleeding. Colonoscopy 3/10 with diverticulosis, 2 polyps, congested mucosa.  Plan:  - Continue pantoprazole daily (indefinitely)  - Avoid anticoagulation if possible    Cirrhosis with ascites  Alcoholic hepatitis  Completed 5 day course of ceftriaxone IV for SBP prophylaxis in setting of GIB. Therapeutic  paracentesis completed 4/6/20. Previous issues with NGUYEN with attempts to increase diuretics.   Plan:  - Continue lactulose, goal of 2-3 loose stools daily  - Continue furosemide 20 mg daily, spironolactone 25 mg daily  - LFTs stable 3/23 (alkaline phos stably elevated). Monitor periodically.   - 2 gram sodium restricted diet       Acute kidney injury   Baseline creatinine appears to be ~0.9-1.2. elevated 3/16-17 to 1.4. BP's have been ok. No obvious nephrotoxins administered, no contrast. Possible HRS type II.  - Creatinine stable around 1.4 (4/3). Suspected this is new baseline.   - Avoid nephrotoxins     Suspected BPH with lower urinary tract symptoms  Reports weak stream, nocturia, hesitancy. No documented history of BPH, though has previously been on tamsulosin per records.   - Continue tamsulosin (started 4/3)     Hyponatremia, likely secondary to cirrhosis   Sodium intermittently in low 130s. Last sodium 133 (4/3)  - Monitor periodically, as outpatient     Depression with history of suicidal ideation  Anxiety  PTA duloxetine 60 mg daily, quetiapine 50 mg at bedtime PRN, trazodone 200 mg at bedtime, mirtazapine 15 mg at bedtime. Poor medication compliance, had not taken meds for several weeks PTA. Psych had seen previous hospitalization.   - Psychiatry has seen, greatly appreciate assistance  - Continue duloxetine, mirtazapine, PRN trazodone, quetiapine      Alcohol intoxication  Alcohol dependency   Drinking up to 1.75 L vodka daily. BARNEY on admission at 0.29. Does have hx of DT's, but no h/o withdrawal seizures. Treated for withdrawal on admission.      JANIS on CPAP  Uses at home, declines here.     COPD  Tobacco abuse  Stable.  - Continue nicotine patch     Thrombocytopenia   Moise of 69k 3/9, normalized 3/20. Likely due to toxicity from alcohol and consumption from bleeding given normalization.  - Monitor periodically      Consultations This Hospital Stay   GASTROENTEROLOGY IP CONSULT  PSYCHIATRY IP  CONSULT  SPIRITUAL HEALTH SERVICES IP CONSULT  CHEMICAL DEPENDENCY IP CONSULT  PSYCHIATRY IP CONSULT  SOCIAL WORK IP CONSULT  PHYSICAL THERAPY ADULT IP CONSULT  OCCUPATIONAL THERAPY ADULT IP CONSULT  PSYCHIATRY IP CONSULT  SOCIAL WORK IP CONSULT  PSYCHIATRY IP CONSULT  CHEMICAL DEPENDENCY IP CONSULT    Code Status   DNR/DNI    Time Spent on this Encounter   I, Chivo Murcia MD, personally saw the patient today and spent greater than 30 minutes discharging this patient.       Chivo Murcia MD  [facility]  ______________________________________________________________________    Physical Exam   Vital Signs: Temp: 98.4  F (36.9  C) Temp src: Oral BP: 127/64 Pulse: 60 Heart Rate: 61 Resp: 16 SpO2: 96 % O2 Device: None (Room air)    Weight: 181 lbs 0 oz         Primary Care Physician   FERNANDA BRANTLEY    Discharge Orders      Reason for your hospital stay    You had loss of blood from a GI bleed.     Follow-up and recommended labs and tests     Follow up with primary care provider, FERNANDA BRANTLEY, within 7 days for hospital follow- up.  The following labs/tests are recommended: CBC.     Activity    Your activity upon discharge: activity as tolerated     Diet    Follow this diet upon discharge: Orders Placed This Encounter      Snacks/Supplements Adult: Other; 10 am and 2pm: straw Boost  (RD); Between Meals      2 Gram Sodium Diet       Significant Results and Procedures   Most Recent 3 CBC's:  Recent Labs   Lab Test 03/30/20  0633 03/29/20  0620 03/23/20  0628   WBC 5.5 5.1 6.0   HGB 9.0* 8.7* 9.5*   MCV 89 89 91    240 256     Most Recent 3 BMP's:  Recent Labs   Lab Test 04/03/20  0636 04/01/20  0724 03/30/20  0633    130* 133   POTASSIUM 4.2 4.4 4.4   CHLORIDE 103 100 102   CO2 27 27 25   BUN 34* 33* 32*   CR 1.39* 1.41* 1.45*   ANIONGAP 3 3 6   KEV 9.2 9.1 8.7   GLC 83 94 83     Most Recent 2 LFT's:  Recent Labs   Lab Test 03/23/20  0628 03/14/20  0702   AST 38 43   ALT 48 69    ALKPHOS 253* 169*   BILITOTAL 0.8 0.5     Most Recent 3 INR's:  Recent Labs   Lab Test 04/06/20  0833 03/07/20  1235 02/24/20  1439   INR 1.27* 1.24* 1.24*   ,   Results for orders placed or performed during the hospital encounter of 03/07/20   US Paracentesis    Narrative    ULTRASOUND PARACENTESIS March 9, 2020 9:23 AM       HISTORY:HIGH VOLUME paracentesis with or without diagnostic fluid  analysis with labs to be drawn if ordered. Total paracentesis volume  as much as possible.       PROCEDURE: Written informed consent was obtained from the patient  prior to the procedure. The risks and benefits including bleeding,  infection and abdominal organ injury were discussed and the patient  wished to continue. Initial ultrasound images demonstrated a large  right abdominal fluid collection. A permanent image was saved. The  skin overlying this collection was marked, prepped, draped and  anesthetized in usual sterile fashion utilizing 10 mL of lidocaine 1%.  The paracentesis catheter was then placed into the peritoneal fluid  collection with return of 4900 mL of yellow fluid. Estimated blood  loss during the procedure was less than 5 mL. No specimens collected.  Patient tolerated the procedure well. The patient will receive  intravenous albumin on the usual sliding scale as needed per protocol.       With continuous ultrasound guidance, an 8 French needle and catheter  was advanced into the ascites and image stored for documentation.      Impression    IMPRESSION: Ultrasound-guided paracentesis. 4.9 L removed.    MAXINE JEAN MD   US Paracentesis    Narrative    US PARACENTESIS 3/12/2020 1:07 PM    CLINICAL HISTORY: Ascites.    PROCEDURE: Informed consent obtained. Time out performed. The abdomen  was prepped and draped in a sterile fashion. 10 mL of 1% lidocaine was  infused into local soft tissues. A 5 French catheter system was  introduced into the abdominal ascites under ultrasound guidance.    4 liters of  clear fluid were removed and sent to lab if requested.    Patient tolerated procedure well.    Ultrasound imaging was obtained and placed in the patient's permanent  medical record.      Impression    IMPRESSION:  1.  Status post ultrasound-guided paracentesis.    JASON BENNETT MD   US Paracentesis    Narrative    US PARACENTESIS 3/20/2020 11:09 AM    CLINICAL HISTORY: HIGH VOLUME paracentesis with or without diagnostic  fluid analysis with labs to be drawn if ordered.    PROCEDURE: Informed consent obtained. Time out performed. The abdomen  was prepped and draped in a sterile fashion. 10 mL of 1% lidocaine was  infused into local soft tissues. A 5 Vietnamese catheter system was  introduced into the abdominal ascites under ultrasound guidance.    3 liters of clear fluid were removed and sent to lab if requested.    Patient tolerated procedure well.    Ultrasound imaging was obtained and placed in the patient's permanent  medical record.      Impression    IMPRESSION:  1.  Status post ultrasound-guided paracentesis.    ANNE-MARIE PATEL MD   US Paracentesis    Narrative    US PARACENTESIS 4/6/2020 11:33 AM     HISTORY: HIGH VOLUME paracentesis with or without diagnostic fluid  analysis with labs to be drawn if ordered. Total paracentesis volume  as much as possible.    FINDINGS: Limited preprocedure ultrasound was performed, images show a  sufficient amount of ascites for paracentesis. An image was archived.  Written and oral informed consent was obtained. A pause for the cause  procedure to verify the correct patient and correct procedure. The  skin overlying the right lower quadrant was prepped and draped in the  usual sterile fashion. The subcutaneous tissues were anesthetized with  10mL 1% lidocaine. Under direct ultrasound guidance the catheter was  advanced into the peritoneal space and 3.5 L of  straw colored fluid  was drained. The catheter was removed and a sterile dressing was  applied. There were no immediate  complications. Ultrasound images were  permanently stored.  Patient left the ultrasound suite in satisfactory  condition.      Impression    IMPRESSION: Technically successful paracentesis without immediate  complications.    JAVIER JUDD, DO       Discharge Medications   Discharge Medication List as of 4/9/2020  9:14 AM      START taking these medications    Details   tamsulosin (FLOMAX) 0.4 MG capsule Take 1 capsule (0.4 mg) by mouth every evening, Disp-30 capsule,R-0, E-Prescribe         CONTINUE these medications which have CHANGED    Details   DULoxetine (CYMBALTA) 60 MG capsule Take 1 capsule (60 mg) by mouth daily, Disp-30 capsule,R-0, E-Prescribe      fluticasone-vilanterol (BREO ELLIPTA) 200-25 MCG/INH inhaler Inhale 1 puff into the lungs daily, Disp-1 Inhaler,R-0, E-Prescribe      folic acid (FOLVITE) 1 MG tablet Take 1 tablet (1 mg) by mouth daily, Disp-30 tablet,R-0, E-Prescribe      furosemide (LASIX) 20 MG tablet Take 1 tablet (20 mg) by mouth daily, Disp-30 tablet,R-0, E-Prescribe      hydrOXYzine (ATARAX) 25 MG tablet Take 1 tablet (25 mg) by mouth every 6 hours as needed for anxiety, Disp-30 tablet,R-0, E-Prescribe      lactulose (CHRONULAC) 10 GM/15ML solution Take 15 mLs (10 g) by mouth 2 times daily, Disp-946 mL,R-0, E-Prescribe      mirtazapine (REMERON) 15 MG tablet Take 1 tablet (15 mg) by mouth At Bedtime, Disp-30 tablet,R-0, E-Prescribe      multivitamin w/minerals (THERA-VIT-M) tablet Take 1 tablet by mouth daily, Disp-30 each,R-0, E-Prescribe      QUEtiapine (SEROQUEL) 50 MG tablet Take 1 tablet (50 mg) by mouth nightly as needed (sleep), Disp-30 tablet,R-0, E-Prescribe      spironolactone (ALDACTONE) 25 MG tablet Take 1 tablet (25 mg) by mouth daily, Disp-30 tablet,R-0, E-Prescribe      traZODone (DESYREL) 100 MG tablet Take 1 tablet (100 mg) by mouth nightly as needed for sleep, Disp-30 tablet,R-0, E-Prescribe         CONTINUE these medications which have NOT CHANGED    Details    order for DME Equipment being ordered: Walker Wheels () and Walker ()  Treatment Diagnosis: difficulty walkingDisp-1 each, R-0, Local Print         STOP taking these medications       albuterol (PROAIR HFA/PROVENTIL HFA/VENTOLIN HFA) 108 (90 BASE) MCG/ACT Inhaler Comments:   Reason for Stopping:             Allergies   Allergies   Allergen Reactions     Amlodipine Swelling     Lisinopril      Other reaction(s): Angioedema  Mouth and tongue swelling   Mouth and tongue swelling

## 2020-04-09 NOTE — PROGRESS NOTES
Patient discharged at 9:30 AM to [facility] via [facility] transport. IV was discontinued. Belongings returned to patient along with inhaler. Prescriptions handed to transport.  Pt is A/Ox4. VSS, on RA. Denies pain. Atarax given x1. Nicotine patch on L deltoid. Up independently, ambulating in hallway.

## 2020-04-30 ENCOUNTER — TELEPHONE (OUTPATIENT)
Dept: BEHAVIORAL HEALTH | Facility: CLINIC | Age: 65
End: 2020-04-30

## 2020-04-30 NOTE — TELEPHONE ENCOUNTER
"S: [facility], [facility]    B: Pt currently at [facility], but will need commitment revoked.   Giorgi reports medical concerns   Pt calling his neighbors and telling them \"goodbye\"  Pt reports he wants to be dead and looking to check himself out of tx  Giorgi reports that pt will need to be hospitalized, understands pt will need to go through the ED as there are no direct admits   [place]  is agreement with revoking commitment    A: Pt will likely be on a hold    R: Intake informed Giorgi that pt will need to go through the ED. He expressed he will get things in motion this evening.   "

## 2020-05-01 ENCOUNTER — HOSPITAL ENCOUNTER (INPATIENT)
Facility: CLINIC | Age: 65
LOS: 3 days | Discharge: HOME | DRG: 885 | End: 2020-05-04
Attending: EMERGENCY MEDICINE | Admitting: PSYCHIATRY & NEUROLOGY
Payer: MEDICARE

## 2020-05-01 DIAGNOSIS — N40.0 BENIGN PROSTATIC HYPERPLASIA, UNSPECIFIED WHETHER LOWER URINARY TRACT SYMPTOMS PRESENT: Primary | ICD-10-CM

## 2020-05-01 DIAGNOSIS — K21.9 GASTROESOPHAGEAL REFLUX DISEASE WITHOUT ESOPHAGITIS: ICD-10-CM

## 2020-05-01 DIAGNOSIS — R45.89 AT RISK FOR SUICIDE: ICD-10-CM

## 2020-05-01 DIAGNOSIS — F32.A DEPRESSION, UNSPECIFIED DEPRESSION TYPE: ICD-10-CM

## 2020-05-01 DIAGNOSIS — F10.930 ALCOHOL WITHDRAWAL SYNDROME WITHOUT COMPLICATION (H): ICD-10-CM

## 2020-05-01 DIAGNOSIS — K70.31 ALCOHOLIC CIRRHOSIS OF LIVER WITH ASCITES (H): ICD-10-CM

## 2020-05-01 DIAGNOSIS — F33.2 SEVERE EPISODE OF RECURRENT MAJOR DEPRESSIVE DISORDER, WITHOUT PSYCHOTIC FEATURES (H): ICD-10-CM

## 2020-05-01 DIAGNOSIS — J43.9 PULMONARY EMPHYSEMA, UNSPECIFIED EMPHYSEMA TYPE (H): ICD-10-CM

## 2020-05-01 PROBLEM — R45.851 DEPRESSION WITH SUICIDAL IDEATION: Status: ACTIVE | Noted: 2020-05-01

## 2020-05-01 LAB
ALBUMIN SERPL-MCNC: 2.9 G/DL (ref 3.4–5)
ALP SERPL-CCNC: 226 U/L (ref 40–150)
ALT SERPL W P-5'-P-CCNC: 25 U/L (ref 0–70)
ANION GAP SERPL CALCULATED.3IONS-SCNC: 5 MMOL/L (ref 3–14)
AST SERPL W P-5'-P-CCNC: 27 U/L (ref 0–45)
BASOPHILS # BLD AUTO: 0 10E9/L (ref 0–0.2)
BASOPHILS NFR BLD AUTO: 0.5 %
BILIRUB SERPL-MCNC: 0.7 MG/DL (ref 0.2–1.3)
BUN SERPL-MCNC: 28 MG/DL (ref 7–30)
CALCIUM SERPL-MCNC: 8.4 MG/DL (ref 8.5–10.1)
CHLORIDE SERPL-SCNC: 106 MMOL/L (ref 94–109)
CO2 SERPL-SCNC: 24 MMOL/L (ref 20–32)
CREAT SERPL-MCNC: 1.49 MG/DL (ref 0.66–1.25)
DIFFERENTIAL METHOD BLD: ABNORMAL
EOSINOPHIL # BLD AUTO: 0.1 10E9/L (ref 0–0.7)
EOSINOPHIL NFR BLD AUTO: 2.5 %
ERYTHROCYTE [DISTWIDTH] IN BLOOD BY AUTOMATED COUNT: 15.6 % (ref 10–15)
GFR SERPL CREATININE-BSD FRML MDRD: 48 ML/MIN/{1.73_M2}
GLUCOSE SERPL-MCNC: 90 MG/DL (ref 70–99)
HCT VFR BLD AUTO: 27.2 % (ref 40–53)
HGB BLD-MCNC: 9 G/DL (ref 13.3–17.7)
IMM GRANULOCYTES # BLD: 0 10E9/L (ref 0–0.4)
IMM GRANULOCYTES NFR BLD: 0.2 %
LYMPHOCYTES # BLD AUTO: 0.6 10E9/L (ref 0.8–5.3)
LYMPHOCYTES NFR BLD AUTO: 10 %
MCH RBC QN AUTO: 29.3 PG (ref 26.5–33)
MCHC RBC AUTO-ENTMCNC: 33.1 G/DL (ref 31.5–36.5)
MCV RBC AUTO: 89 FL (ref 78–100)
MONOCYTES # BLD AUTO: 0.6 10E9/L (ref 0–1.3)
MONOCYTES NFR BLD AUTO: 10.9 %
NEUTROPHILS # BLD AUTO: 4.3 10E9/L (ref 1.6–8.3)
NEUTROPHILS NFR BLD AUTO: 75.9 %
PLATELET # BLD AUTO: 180 10E9/L (ref 150–450)
POTASSIUM SERPL-SCNC: 4.1 MMOL/L (ref 3.4–5.3)
PROT SERPL-MCNC: 6.9 G/DL (ref 6.8–8.8)
RBC # BLD AUTO: 3.07 10E12/L (ref 4.4–5.9)
SARS-COV-2 PCR COMMENT: NORMAL
SARS-COV-2 RNA SPEC QL NAA+PROBE: NEGATIVE
SARS-COV-2 RNA SPEC QL NAA+PROBE: NORMAL
SODIUM SERPL-SCNC: 135 MMOL/L (ref 133–144)
SPECIMEN SOURCE: NORMAL
SPECIMEN SOURCE: NORMAL
TSH SERPL DL<=0.005 MIU/L-ACNC: 2.1 MU/L (ref 0.4–4)
WBC # BLD AUTO: 5.7 10E9/L (ref 4–11)

## 2020-05-01 PROCEDURE — 84443 ASSAY THYROID STIM HORMONE: CPT | Performed by: PSYCHIATRY & NEUROLOGY

## 2020-05-01 PROCEDURE — 84443 ASSAY THYROID STIM HORMONE: CPT | Performed by: EMERGENCY MEDICINE

## 2020-05-01 PROCEDURE — 25000132 ZZH RX MED GY IP 250 OP 250 PS 637: Mod: GY | Performed by: PSYCHIATRY & NEUROLOGY

## 2020-05-01 PROCEDURE — 25000132 ZZH RX MED GY IP 250 OP 250 PS 637: Mod: GY | Performed by: PHYSICIAN ASSISTANT

## 2020-05-01 PROCEDURE — 90791 PSYCH DIAGNOSTIC EVALUATION: CPT

## 2020-05-01 PROCEDURE — 12400000 ZZH R&B MH

## 2020-05-01 PROCEDURE — 80053 COMPREHEN METABOLIC PANEL: CPT | Performed by: EMERGENCY MEDICINE

## 2020-05-01 PROCEDURE — 25000132 ZZH RX MED GY IP 250 OP 250 PS 637: Mod: GY | Performed by: EMERGENCY MEDICINE

## 2020-05-01 PROCEDURE — 99285 EMERGENCY DEPT VISIT HI MDM: CPT | Mod: 25

## 2020-05-01 PROCEDURE — 87635 SARS-COV-2 COVID-19 AMP PRB: CPT | Performed by: EMERGENCY MEDICINE

## 2020-05-01 PROCEDURE — 85025 COMPLETE CBC W/AUTO DIFF WBC: CPT | Performed by: EMERGENCY MEDICINE

## 2020-05-01 RX ORDER — FUROSEMIDE 20 MG
20 TABLET ORAL DAILY
Status: DISCONTINUED | OUTPATIENT
Start: 2020-05-02 | End: 2020-05-03

## 2020-05-01 RX ORDER — HYDROXYZINE HYDROCHLORIDE 25 MG/1
25 TABLET, FILM COATED ORAL ONCE
Status: COMPLETED | OUTPATIENT
Start: 2020-05-01 | End: 2020-05-01

## 2020-05-01 RX ORDER — HYDRALAZINE HYDROCHLORIDE 25 MG/1
25 TABLET, FILM COATED ORAL ONCE
Status: COMPLETED | OUTPATIENT
Start: 2020-05-01 | End: 2020-05-01

## 2020-05-01 RX ORDER — HYDROXYZINE HYDROCHLORIDE 25 MG/1
25 TABLET, FILM COATED ORAL EVERY 6 HOURS PRN
Status: DISCONTINUED | OUTPATIENT
Start: 2020-05-01 | End: 2020-05-04 | Stop reason: HOSPADM

## 2020-05-01 RX ORDER — QUETIAPINE FUMARATE 50 MG/1
50 TABLET, FILM COATED ORAL
Status: DISCONTINUED | OUTPATIENT
Start: 2020-05-01 | End: 2020-05-04 | Stop reason: HOSPADM

## 2020-05-01 RX ORDER — TRAZODONE HYDROCHLORIDE 100 MG/1
100 TABLET ORAL
Status: DISCONTINUED | OUTPATIENT
Start: 2020-05-01 | End: 2020-05-04 | Stop reason: HOSPADM

## 2020-05-01 RX ORDER — FOLIC ACID 1 MG/1
1 TABLET ORAL DAILY
Status: DISCONTINUED | OUTPATIENT
Start: 2020-05-02 | End: 2020-05-04 | Stop reason: HOSPADM

## 2020-05-01 RX ORDER — MULTIPLE VITAMINS W/ MINERALS TAB 9MG-400MCG
1 TAB ORAL DAILY
Status: DISCONTINUED | OUTPATIENT
Start: 2020-05-02 | End: 2020-05-04 | Stop reason: HOSPADM

## 2020-05-01 RX ORDER — NICOTINE 21 MG/24HR
1 PATCH, TRANSDERMAL 24 HOURS TRANSDERMAL EVERY 24 HOURS
COMMUNITY
End: 2020-05-01

## 2020-05-01 RX ORDER — MIRTAZAPINE 15 MG/1
15 TABLET, FILM COATED ORAL AT BEDTIME
Status: DISCONTINUED | OUTPATIENT
Start: 2020-05-01 | End: 2020-05-04 | Stop reason: HOSPADM

## 2020-05-01 RX ORDER — TAMSULOSIN HYDROCHLORIDE 0.4 MG/1
0.4 CAPSULE ORAL EVERY EVENING
Status: DISCONTINUED | OUTPATIENT
Start: 2020-05-01 | End: 2020-05-04 | Stop reason: HOSPADM

## 2020-05-01 RX ORDER — HYDROXYZINE HYDROCHLORIDE 25 MG/1
25 TABLET, FILM COATED ORAL EVERY 4 HOURS PRN
Status: DISCONTINUED | OUTPATIENT
Start: 2020-05-01 | End: 2020-05-01

## 2020-05-01 RX ORDER — PANTOPRAZOLE SODIUM 40 MG/1
40 TABLET, DELAYED RELEASE ORAL DAILY
COMMUNITY
End: 2020-05-01

## 2020-05-01 RX ORDER — SPIRONOLACTONE 25 MG/1
25 TABLET ORAL DAILY
Status: DISCONTINUED | OUTPATIENT
Start: 2020-05-02 | End: 2020-05-03

## 2020-05-01 RX ADMIN — HYDROXYZINE HYDROCHLORIDE 25 MG: 25 TABLET ORAL at 16:08

## 2020-05-01 RX ADMIN — TAMSULOSIN HYDROCHLORIDE 0.4 MG: 0.4 CAPSULE ORAL at 21:57

## 2020-05-01 RX ADMIN — QUETIAPINE FUMARATE 50 MG: 50 TABLET ORAL at 21:52

## 2020-05-01 RX ADMIN — MIRTAZAPINE 15 MG: 15 TABLET, FILM COATED ORAL at 21:45

## 2020-05-01 RX ADMIN — TRAZODONE HYDROCHLORIDE 100 MG: 100 TABLET ORAL at 21:52

## 2020-05-01 RX ADMIN — HYDROXYZINE HYDROCHLORIDE 25 MG: 25 TABLET ORAL at 21:52

## 2020-05-01 RX ADMIN — HYDRALAZINE HYDROCHLORIDE 25 MG: 25 TABLET ORAL at 21:45

## 2020-05-01 ASSESSMENT — ACTIVITIES OF DAILY LIVING (ADL)
AMBULATION: 0-->INDEPENDENT
BATHING: 0-->INDEPENDENT
ORAL_HYGIENE: INDEPENDENT
DRESS: 0-->INDEPENDENT
FALL_HISTORY_WITHIN_LAST_SIX_MONTHS: NO
HYGIENE/GROOMING: INDEPENDENT
TRANSFERRING: 0-->INDEPENDENT
RETIRED_COMMUNICATION: 0-->UNDERSTANDS/COMMUNICATES WITHOUT DIFFICULTY
TOILETING: 0-->INDEPENDENT
SWALLOWING: 0-->SWALLOWS FOODS/LIQUIDS WITHOUT DIFFICULTY
RETIRED_EATING: 0-->INDEPENDENT
COGNITION: 0 - NO COGNITION ISSUES REPORTED
DRESS: SCRUBS (BEHAVIORAL HEALTH)

## 2020-05-01 ASSESSMENT — MIFFLIN-ST. JEOR
SCORE: 1609.54
SCORE: 1577.78

## 2020-05-01 NOTE — PHARMACY-ADMISSION MEDICATION HISTORY
Pharmacy Medication History  Admission medication history interview status for the 5/1/2020  admission is complete. See EPIC admission navigator for prior to admission medications     Medication history sources: Medication list provided by [facility]  Medication history source reliability: Good  Adherence assessment: Good    Removed:  Duloxetine (Cymbalta) 60 mg daily.  Lactulose 10 g BID  Pantoprazole 40 mg daily  Nicotine patch 14 mg daily    Added: Vortioxetine (Trintellix) 10 mg daily.    Unknown last dose times.     Medication reconciliation completed by provider prior to medication history? No    Time spent in this activity: 30 minutes      Prior to Admission medications    Medication Sig Last Dose Taking? Auth Provider   fluticasone-vilanterol (BREO ELLIPTA) 200-25 MCG/INH inhaler Inhale 1 puff into the lungs daily  Yes Chivo Murcia MD   folic acid (FOLVITE) 1 MG tablet Take 1 tablet (1 mg) by mouth daily  Yes Chivo Murcia MD   furosemide (LASIX) 20 MG tablet Take 1 tablet (20 mg) by mouth daily  Yes Chivo Murcia MD   hydrOXYzine (ATARAX) 25 MG tablet Take 1 tablet (25 mg) by mouth every 6 hours as needed for anxiety  Yes Chivo Murcia MD   mirtazapine (REMERON) 15 MG tablet Take 1 tablet (15 mg) by mouth At Bedtime  Yes Chivo Murcia MD   multivitamin w/minerals (THERA-VIT-M) tablet Take 1 tablet by mouth daily  Yes Chivo Murcia MD   QUEtiapine (SEROQUEL) 50 MG tablet Take 1 tablet (50 mg) by mouth nightly as needed (sleep)  Yes Chivo Murcia MD   spironolactone (ALDACTONE) 25 MG tablet Take 1 tablet (25 mg) by mouth daily  Yes Chivo Murcia MD   tamsulosin (FLOMAX) 0.4 MG capsule Take 1 capsule (0.4 mg) by mouth every evening  Yes Chivo Murcia MD   traZODone (DESYREL) 100 MG tablet Take 1 tablet (100 mg) by mouth nightly as needed for sleep  Yes Chivo Murcia MD   vortioxetine (TRINTELLIX) 10 MG tablet Take 10 mg by mouth daily  Yes Unknown, Entered By History   order for DME Equipment being  ordered: Walker Wheels () and Walker ()  Treatment Diagnosis: difficulty walking   Donell Lucas MD Sharon Chandler, PharmD, BCPS

## 2020-05-01 NOTE — ED NOTES
"[facility]  ED Nurse Handoff Report    ED Chief complaint: Social Work Services      ED Diagnosis:   Final diagnoses:   None       Code Status: DNR/DNI    Allergies:   Allergies   Allergen Reactions     Amlodipine Swelling     Lisinopril      Other reaction(s): Angioedema  Mouth and tongue swelling   Mouth and tongue swelling          Patient Story: facility is unable to meet patients needs.  Focused Assessment:  Call received prior to pt's arrival to the ED, from Dr Rand who has been seeing pt at [facility].  Dr Rand reported that due to medical issues they no longer felt able to care for pt and were sending him to the ED for medical work up.  Dr Rand however also reported that pt has been displaying increased symptoms of depression, calling neighbors to say goodbye, and he is concerned that pt may be suicidal.  Pt's Leana Co  was contacted by Dr Rand and will reportedly be revoking pt's stay of commitment.  If not requiring admission for medical reasons, Dr Rand is recommending pt be admitted to .   This info was passed on to Dr Milan who will also speak with Dr Rand    Treatments and/or interventions provided: IV placed and labs drawn  Patient's response to treatments and/or interventions: tolerated well    To be done/followed up on inpatient unit:  TBD    Does this patient have any cognitive concerns?: alert and oriented x 4    Activity level - Baseline/Home:  Independent  Activity Level - Current:   Independent    Patient's Preferred language: English   Needed?: No    Isolation: None  Infection: Not Applicable  Bariatric?: No    Vital Signs:   Vitals:    05/01/20 1428 05/01/20 1537 05/01/20 1538   BP: (!) 166/87 (!) 152/83    Pulse:  65    Resp: 18     Temp: 99.8  F (37.7  C)     TempSrc: Oral     SpO2: 99%  99%   Weight: 87.1 kg (192 lb)     Height: 1.702 m (5' 7\")         Cardiac Rhythm:     Was the PSS-3 " completed:   Yes  What interventions are required if any?               Family Comments: no family present  OBS brochure/video discussed/provided to patient/family: No              Name of person given brochure if not patient: na              Relationship to patient: na    For the majority of the shift this patient's behavior was Green.   Behavioral interventions performed were na.    ED NURSE PHONE NUMBER: *[phone]

## 2020-05-01 NOTE — ED NOTES
Bed: ED09  Expected date:   Expected time:   Means of arrival:   Comments:  eliseo - 513 - 65 M liver failure eta 1418

## 2020-05-01 NOTE — ED NOTES
Patient changed into scrubs. Personal items bagged up and taped. Courtesy meal will be sent up with pt.

## 2020-05-01 NOTE — PROGRESS NOTES
Call received prior to pt's arrival to the ED, from Dr Rand who has been seeing pt at [facility].  Dr Rand reported that due to medical issues they no longer felt able to care for pt and were sending him to the ED for medical work up.  Dr Rand however also reported that pt has been displaying increased symptoms of depression, calling neighbors to say goodbye, and he is concerned that pt may be suicidal.  Pt's Leana Co  was contacted by Dr Rand and will reportedly be revoking pt's stay of commitment.  If not requiring admission for medical reasons, Dr Rand is recommending pt be admitted to .   This info was passed on to Dr Milan who will also speak with Dr Rand.

## 2020-05-01 NOTE — ED TRIAGE NOTES
Sent from sober house, medical director there feels they cannot manage his medical conditions in the sober house and that the patient may require more monitoring. Patient has no complaints, states his last paracentesis was 3 weeks ago.

## 2020-05-01 NOTE — ED PROVIDER NOTES
History     Chief Complaint:  Social Work Services       HPI   Jim Richard is a 65 year old male with a history of depression who presents with social work services. The patient was sent by Dr. Rand from [facility] [place] after they felt they were unable to manage the patient's medical conditions at the patient's sober house and may require further monitoring. The patient's  was also contacted by Dr. Rand and will reportedly be revoking the patient's stay of commitment. Dr. Rand reports that the patient was displaying increased symptoms of depression and may try to kill himself after leaving the treatment center. He recommended the patient be admitted to mental health.     Here, the patient denies having any suicidal ideations and recently had a paracentesis done a few weeks ago.     Allergies:  Amlodipine  Lisinopril     Medications:    cymbalta   furosemide  Atarax   mirtazapin  seroquel   spironolactone  tamsulosin  Trazodone   trintellix     Past Medical History:    Alcoholism (H)   Anxiety   Coronary artery disease   Alcoholic hepatitis   Depression   Esophageal varices with bleeding   Heart attack (H)   Hepatomegaly   Hyperlipemia   Hypertension   JANIS on CPAP   Peripheral vascular disease (H)   PVD (peripheral vascular disease) (H)   PAD  SDH (subdural hematoma) (H)   Spinal stenosis   Chronic pain syndrome   COPD  Alcoholic liver disease    Past Surgical History:    Appendectomy  Bypass graft femoropopliteal  Endarterectomy femoral  Hernia repair  Laminectomy, fusion lumbar three+ level  Tonsil and adenoidectomy     Family History:    Mental illness  C.A.D.  Substance abuse  Cancer     Social History:  Patient is   Tobacco Use: Current smoker  Alcohol Use: Yes  PCP: FERNANDA BRANTLEY     Review of Systems   Psychiatric/Behavioral: Negative for suicidal ideas.   All other systems reviewed and are negative.      Physical Exam     Patient Vitals  "for the past 24 hrs:   BP Temp Temp src Pulse Heart Rate Resp SpO2 Height Weight   05/01/20 1630 (!) 170/83 -- -- 63 -- -- 98 % -- --   05/01/20 1538 -- -- -- -- -- -- 99 % -- --   05/01/20 1537 (!) 152/83 -- -- 65 -- -- -- -- --   05/01/20 1428 (!) 166/87 99.8  F (37.7  C) Oral -- 77 18 99 % 1.702 m (5' 7\") 87.1 kg (192 lb)        Physical Exam  Vitals: reviewed by me  General: Pt seen on hospital [facility], cooperative, and alert to conversation  Eyes: Tracking well, clear conjunctiva BL  ENT: MMM, midline trachea.   Lungs:  No tachypnea, no accessory muscle use. No respiratory distress.   CV: Rate as above, regular rhythm.    Abd: distended.  MSK: no peripheral edema or joint effusion.  No evidence of trauma  Skin: No rash, normal turgor and temperature  Neuro: Clear speech and no facial droop.  Psych: Not RIS, no e/o AH/VH.  Denies SI or HI       Emergency Department Course     Laboratory:  Laboratory findings were communicated with the patient who voiced understanding of the findings.    CBC:  WBC 5.7, HGB 9.0 (L), , o/w WNL     CMP: GFR estimate 48 (L), GFR estimate if black 56 (L), Calcium 8.4 (L), Albumin 2.9 (L), Alkphos 226 (H), Creatinine 1.49 (H), o/w wnl      COVID-19 Virus (Coronavirus) by PCR Nasopharyngeal swab: pending         Interventions:  1608 Atarax 25 mg oral     Emergency Department Course:  Past medical records, nursing notes, and vitals reviewed.    1430 I performed an exam of the patient as documented above.      I spoke with Dr. Rand of the Psychiatric service from [facility] regarding patient's presentation, findings, and plan of care.      1800 Patient rechecked and updated.       Findings and plan explained to the Patient who consents to admission to psychiatry.     Impression & Plan   Covid-19  Jim Richard was evaluated during a global COVID-19 pandemic, which necessitated consideration that the patient might be at risk for infection with the SARS-CoV-2 virus " that causes COVID-19.   Applicable protocols for evaluation were followed during the patient's care.   COVID-19 was considered as part of the patient's evaluation. The plan for testing is:  a test was obtained during this visit.     Medical Decision Making:  Jim Richard is a 65 year old male who presents to the emergency department today with what appears to be depression and suicidality. He denies suicidality here in the ER, but after discussing with his psychiatrist, Dr. Rand, Dr. Rand tells me that he has significant concern that the patient will attempt to kill himself immediately after his commitment runs out. Dr. Rand tells me that his commitment is due to run out very soon and he needs to be reevaluated by a [facility] psychiatrist. The patient states that he is willing to come in voluntarily, denies any active suicidality. He has some chronic abnormal medical issues including liver failure, but his labs are consistent with past. Will admit him to psychiatric attending.      Discharge Diagnosis:    ICD-10-CM    1. Depression, unspecified depression type  F32.9 COVID-19 Virus (Coronavirus) by PCR Nasopharyngeal swab     TSH with free T4 reflex     TSH with free T4 reflex     CANCELED: TSH with free T4 reflex and/or T3 as indicated   2. At risk for suicide  R45.89        Disposition:  Admitted to psychiatry.    Scribe Disclosure:  I, Cyrus Milligan, am serving as a scribe at 2:30 PM on 5/1/2020 to document services personally performed by Arden Milan MD based on my observations and the provider's statements to me.      5/1/2020   Arden Milan MD Fitzgerald, Michael Maxwell Kreofsky, MD  05/01/20 1957

## 2020-05-02 PROCEDURE — 99223 1ST HOSP IP/OBS HIGH 75: CPT | Mod: AI | Performed by: HOSPITALIST

## 2020-05-02 PROCEDURE — 25000132 ZZH RX MED GY IP 250 OP 250 PS 637: Mod: GY | Performed by: PSYCHIATRY & NEUROLOGY

## 2020-05-02 PROCEDURE — 99223 1ST HOSP IP/OBS HIGH 75: CPT | Mod: 95 | Performed by: PSYCHIATRY & NEUROLOGY

## 2020-05-02 PROCEDURE — 12400000 ZZH R&B MH

## 2020-05-02 RX ORDER — NICOTINE 21 MG/24HR
1 PATCH, TRANSDERMAL 24 HOURS TRANSDERMAL DAILY
Status: DISCONTINUED | OUTPATIENT
Start: 2020-05-02 | End: 2020-05-04 | Stop reason: HOSPADM

## 2020-05-02 RX ORDER — LACTULOSE 10 G/15ML
10 SOLUTION ORAL DAILY
Status: DISCONTINUED | OUTPATIENT
Start: 2020-05-03 | End: 2020-05-03

## 2020-05-02 RX ORDER — PANTOPRAZOLE SODIUM 40 MG/1
40 TABLET, DELAYED RELEASE ORAL
Status: DISCONTINUED | OUTPATIENT
Start: 2020-05-03 | End: 2020-05-04 | Stop reason: HOSPADM

## 2020-05-02 RX ADMIN — TRAZODONE HYDROCHLORIDE 100 MG: 100 TABLET ORAL at 21:23

## 2020-05-02 RX ADMIN — VORTIOXETINE 10 MG: 10 TABLET, FILM COATED ORAL at 08:09

## 2020-05-02 RX ADMIN — FLUTICASONE FUROATE AND VILANTEROL TRIFENATATE 1 PUFF: 200; 25 POWDER RESPIRATORY (INHALATION) at 08:10

## 2020-05-02 RX ADMIN — FOLIC ACID 1 MG: 1 TABLET ORAL at 08:09

## 2020-05-02 RX ADMIN — HYDROXYZINE HYDROCHLORIDE 25 MG: 25 TABLET ORAL at 21:22

## 2020-05-02 RX ADMIN — MIRTAZAPINE 15 MG: 15 TABLET, FILM COATED ORAL at 21:20

## 2020-05-02 RX ADMIN — HYDROXYZINE HYDROCHLORIDE 25 MG: 25 TABLET ORAL at 09:34

## 2020-05-02 RX ADMIN — HYDROXYZINE HYDROCHLORIDE 25 MG: 25 TABLET ORAL at 15:13

## 2020-05-02 RX ADMIN — TAMSULOSIN HYDROCHLORIDE 0.4 MG: 0.4 CAPSULE ORAL at 21:20

## 2020-05-02 RX ADMIN — SPIRONOLACTONE 25 MG: 25 TABLET ORAL at 08:09

## 2020-05-02 RX ADMIN — MULTIPLE VITAMINS W/ MINERALS TAB 1 TABLET: TAB at 08:09

## 2020-05-02 RX ADMIN — FUROSEMIDE 20 MG: 20 TABLET ORAL at 08:09

## 2020-05-02 RX ADMIN — QUETIAPINE FUMARATE 50 MG: 50 TABLET ORAL at 21:22

## 2020-05-02 ASSESSMENT — ACTIVITIES OF DAILY LIVING (ADL)
DRESS: INDEPENDENT;STREET CLOTHES
ORAL_HYGIENE: INDEPENDENT
DRESS: SCRUBS (BEHAVIORAL HEALTH);INDEPENDENT
ORAL_HYGIENE: INDEPENDENT
LAUNDRY: WITH SUPERVISION
HYGIENE/GROOMING: INDEPENDENT
HYGIENE/GROOMING: INDEPENDENT

## 2020-05-02 NOTE — PLAN OF CARE
Problem: Depressive Symptoms  Goal: Depressive Symptoms  Description: Signs and symptoms of listed problems will be absent or manageable.  Flowsheets (Taken 5/2/2020 6668)  Depressive Symptoms Assessed: all  Depressive Symptoms Present:   affect   mood   anxiety   insight  Note: Patient denies suicidal ideation.  Patient is calm and cooperative and trying not to be upset about his admission. He states that he does not even know if he should go back to [place]. He has spent most of his time in his room resting or reading.  His abdomen is extremely distended and taut. He denies pain but states that it has increased from yesterday. Awaiting hospitalist to see patient.

## 2020-05-02 NOTE — H&P
[facility]    History and Physical - Hospitalist Service       Date of Admission:  5/1/2020    Assessment & Plan   Jim Richard is a 65 year old male admitted to the mental health unit on 5/1/2020. He has a history of alcohol dependence in remission, alcoholic cirrhosis with ascites, CAD, JANIS, depression and anxiety. The hospitalist service was asked to see patient for medical management.    Cirrhosis with ascites  Esophageal varices  Chronic anemia and thrombocytopenia   Hepatic encephalopathy  - patient has tense ascites. Last paracentesis was almost 3 weeks ago.  - IR paracentesis ordered (therapeutic). Will check INR  - Hgb and platelets are stable  - continue lasix and spironolactone  - change diet to low sodium  - restart PPI   - restart lactulose (pt self discontinued 1 wk ago). Will restart at once daily in the morning. Check ammonia level. Pt does not appear to be acutely encephalopathic.    Mild NGUYEN on CKD  Baseline creat ~ 1.4. Just slightly elevated here at 1.49. Suspect hepatorenal syndrome.  - avoid nephrotoxins   - recheck tomorrow    Tremor: pt says tremor is baseline  - check ammonia  - could consider propranolol if this is truly essential tremor     BPH: continue flomax     Depression with history of suicidal ideation  Anxiety  - continue PTA meds  - defer any further treatment to psychiatry     History of alcohol abuse in remission  Last drink was in March prior to prolonged admission at this hospital.       Diet: 2 Gram Sodium Diet    DVT Prophylaxis: Ambulate every shift  Leger Catheter: not present  Code Status: Full Code      Disposition Plan   Expected discharge: per primary service  Entered: Ursula Owen MD 05/02/2020, 1:13 PM     The patient's care was discussed with the Bedside Nurse and Patient.    Ursula Owen MD  [facility]    ______________________________________________________________________    Chief Complaint   Distended  "abdomen    History is obtained from the patient    History of Present Illness   Jim Richard is a 65 year old male who was sent to the hospital for unclear reasons, apparently suicidal ideation and \"medical issues.\" He denies ever being suicidal or saying that might have lead anyone to believe he was suicidal. He is not sure why he was sent to the hospital and is not happy about it, but seems to be good natured today and laughs at the situation. His blood pressure was elevated yesterday evening and he acknowledges that he was agitated when he came in because he wasn't clear why he was sent here. BP has been fine today. He denies any recent fever or chills. No cough. Abdomen is tense and somewhat uncomfortable but not painful. He is tolerating a normal diet and having regular bowel movements. He self d/c'd lactulose about 5-6 days ago due to diarrhea. Says he hates having to wear pull-ups and wasn't sleeping well due to diarrhea all night. He is willing to restart lactulose once a day. He feels that he can wait until Monday for paracentesis if need be. Pleasant and conversational. When I pointed out his tremor he notes that he always has that and that it is worse when he is anxious.    Review of Systems    The 10 point Review of Systems is negative other than noted in the HPI or here.     Past Medical History    I have reviewed this patient's medical history and updated it with pertinent information if needed.   Past Medical History:   Diagnosis Date     Alcoholism (H) 1/14/2013    had treatment at [facility]     Anxiety      Coronary artery disease 09/09/2014    Nuclear stress test with slight fixed defect inferiorly, no ischemia     Depression     w/anxiety     Esophageal varices with bleeding 04/28/2018    6 varices banded on EGD     Heart attack (H)      Hepatomegaly     Fatty liver, chronic alcoholic     Hyperlipemia      Hypertension      JANIS on CPAP      Peripheral vascular disease (H)      PVD " (peripheral vascular disease) (H)      SDH (subdural hematoma) (H) 04/26/2018    Right side, resolved spontaneously     Spinal stenosis        Past Surgical History   I have reviewed this patient's surgical history and updated it with pertinent information if needed.  Past Surgical History:   Procedure Laterality Date     APPENDECTOMY       BYPASS GRAFT FEMOROPOPLITEAL  6/12/2012    Procedure: BYPASS GRAFT FEMOROPOPLITEAL;  LEFT FEMORAL TO ABOVE KNEE POPLITEAL BYPASS, ENDARTERECTOMY OF SFA AND PF ARTERIES, LEFT EXTERNAL ILIAC TO COMMON FEMORAL INTERPOSITION GRAFT;  Surgeon: Damir Roberts MD;  Location:  OR     COLONOSCOPY       COLONOSCOPY N/A 8/8/2019    Procedure: COLONOSCOPY;  Surgeon: Pavan Arguello MD;  Location:  GI     COLONOSCOPY N/A 3/10/2020    Procedure: COLONOSCOPY;  Surgeon: Rosendo Shaw MD;  Location:  GI     ENDARTERECTOMY FEMORAL  6/12/2012    Procedure: ENDARTERECTOMY FEMORAL;;  Surgeon: Damir Roberts MD;  Location:  OR     ESOPHAGOSCOPY, GASTROSCOPY, DUODENOSCOPY (EGD), COMBINED N/A 3/22/2018    Procedure: COMBINED ESOPHAGOSCOPY, GASTROSCOPY, DUODENOSCOPY (EGD);  ESOPHAGOSCOPY, GASTROSCOPY, DUODENOSOCPY.;  Surgeon: Valeri Pruitt MD;  Location:  OR     ESOPHAGOSCOPY, GASTROSCOPY, DUODENOSCOPY (EGD), COMBINED N/A 9/29/2018    Procedure: COMBINED ESOPHAGOSCOPY, GASTROSCOPY, DUODENOSCOPY (EGD);;  Surgeon: Rosendo Shaw MD;  Location:  GI     ESOPHAGOSCOPY, GASTROSCOPY, DUODENOSCOPY (EGD), COMBINED N/A 8/2/2019    Procedure: ESOPHAGOGASTRODUODENOSCOPY (EGD);  Surgeon: Pavan Arguello MD;  Location:  GI     ESOPHAGOSCOPY, GASTROSCOPY, DUODENOSCOPY (EGD), COMBINED N/A 9/25/2019    Procedure: ESOPHAGOGASTRODUODENOSCOPY (EGD);  Surgeon: Pavan Arguello MD;  Location:  GI     ESOPHAGOSCOPY, GASTROSCOPY, DUODENOSCOPY (EGD), COMBINED N/A 3/8/2020    Procedure: ESOPHAGOGASTRODUODENOSCOPY (EGD);  Surgeon: Rosendo Shaw MD;  Location: [facility] GI     HERNIA  REPAIR  2017    Abdominal     LAMINECTOMY, FUSION LUMBAR THREE+ LEVEL, COMBINED N/A 9/22/2014    Procedure: COMBINED LAMINECTOMY, FUSION LUMBAR THREE+ LEVEL;  Surgeon: Sebastien Pruitt MD;  Location: [facility] OR     TONSILLECTOMY & ADENOIDECTOMY         Social History   I have reviewed this patient's social history and updated it with pertinent information if needed.  Social History     Tobacco Use     Smoking status: Current Every Day Smoker     Packs/day: 1.00     Types: Cigarettes     Smokeless tobacco: Never Used     Tobacco comment: Chantix caused nightmares   Substance Use Topics     Alcohol use: Yes     Comment: Last drank Yesterday drinks 750 ml of vodka daily     Drug use: No       Family History   I have reviewed this patient's family history and updated it with pertinent information if needed.   Family History   Problem Relation Age of Onset     Mental Illness Son      Coronary Artery Disease Early Onset Mother      Substance Abuse Father      Lung Cancer Father      Substance Abuse Brother      Unknown/Adopted No family hx of      Depression No family hx of      Anxiety Disorder No family hx of      Schizophrenia No family hx of      Bipolar Disorder No family hx of      Suicide No family hx of      Dementia No family hx of      [place] Disease No family hx of      Parkinsonism No family hx of      Autism Spectrum Disorder No family hx of      Intellectual Disability (Mental Retardation) No family hx of        Prior to Admission Medications   Prior to Admission Medications   Prescriptions Last Dose Informant Patient Reported? Taking?   QUEtiapine (SEROQUEL) 50 MG tablet  Other No Yes   Sig: Take 1 tablet (50 mg) by mouth nightly as needed (sleep)   fluticasone-vilanterol (BREO ELLIPTA) 200-25 MCG/INH inhaler  Other No Yes   Sig: Inhale 1 puff into the lungs daily   folic acid (FOLVITE) 1 MG tablet  Other No Yes   Sig: Take 1 tablet (1 mg) by mouth daily   furosemide (LASIX) 20 MG tablet  Other No Yes   Sig:  Take 1 tablet (20 mg) by mouth daily   hydrOXYzine (ATARAX) 25 MG tablet  Other No Yes   Sig: Take 1 tablet (25 mg) by mouth every 6 hours as needed for anxiety   mirtazapine (REMERON) 15 MG tablet  Other No Yes   Sig: Take 1 tablet (15 mg) by mouth At Bedtime   multivitamin w/minerals (THERA-VIT-M) tablet  Other No Yes   Sig: Take 1 tablet by mouth daily   order for DME   No No   Sig: Equipment being ordered: Walker Wheels () and Walker ()  Treatment Diagnosis: difficulty walking   spironolactone (ALDACTONE) 25 MG tablet  Other No Yes   Sig: Take 1 tablet (25 mg) by mouth daily   tamsulosin (FLOMAX) 0.4 MG capsule  Other No Yes   Sig: Take 1 capsule (0.4 mg) by mouth every evening   traZODone (DESYREL) 100 MG tablet  Other No Yes   Sig: Take 1 tablet (100 mg) by mouth nightly as needed for sleep   vortioxetine (TRINTELLIX) 10 MG tablet  Other Yes Yes   Sig: Take 10 mg by mouth daily      Facility-Administered Medications: None     Allergies   Allergies   Allergen Reactions     Amlodipine Swelling     Lisinopril      Other reaction(s): Angioedema  Mouth and tongue swelling   Mouth and tongue swelling          Physical Exam   Vital Signs: Temp: 98.4  F (36.9  C) Temp src: Oral BP: 123/74 Pulse: 60 Heart Rate: 60 Resp: 16 SpO2: 96 % O2 Device: None (Room air)    Weight: 185 lbs 0 oz    Constitutional: awake, alert, cooperative, no apparent distress, and appears stated age  Respiratory: No increased work of breathing, good air exchange, clear to auscultation bilaterally, no crackles or wheezing  Cardiovascular: Normal apical impulse, regular rate and rhythm, normal S1 and S2, no S3 or S4, and no murmur noted  GI: taut, distended, nontender, +bs  Skin: no redness, warmth, or swelling  Musculoskeletal: no lower extremity pitting edema present  Neurologic: Awake, alert, oriented to name, place and time.  Cranial nerves II-XII are grossly intact.  Mild tremor.  Neuropsychiatric: General: normal, calm and normal  eye contact  Level of consciousness: alert / normal  Affect: normal and pleasant    Data   Data reviewed today: I reviewed all medications, new labs and imaging results over the last 24 hours. I personally reviewed no images or EKG's today.    Recent Labs   Lab 05/01/20  1525   WBC 5.7   HGB 9.0*   MCV 89         POTASSIUM 4.1   CHLORIDE 106   CO2 24   BUN 28   CR 1.49*   ANIONGAP 5   KEV 8.4*   GLC 90   ALBUMIN 2.9*   PROTTOTAL 6.9   BILITOTAL 0.7   ALKPHOS 226*   ALT 25   AST 27     No results found for this or any previous visit (from the past 24 hour(s)).

## 2020-05-02 NOTE — PROVIDER NOTIFICATION
05/02/20 0054   Valuables   Patient Belongings locker   Patient Belongings Put in Hospital Secure Location (Security or Locker, etc.) cell phone/electronics;clothing;medication(s);other (see comments)  (Checks)   Did you bring any home meds/supplements to the hospital?  Yes   Disposition of meds  Other (see comment)  (Safe on [address])   Bottle of Lactulose x 2  Breo inhaler x 3  Artificial tears   Green bag filled w/ misc papers/mail  Book x 5  Misc toiletries   Cell phone & charging cord  Glasses in case  Electric razor w/ case & 2 additional small electric razor   Toe nail clipper  Sandals   Jacket  Lighter  T-shirt  Pair of socks x 2  Underwear x 2  Pair of jeans  Belt  Sweatshirt w/ strings    Security Envelope #s [id number] & [id number]  Bottle of Trintellix 10 mg  Bottle of Trazodone 100 mg  Bottle of Seroquel 50 mg  Bottle of Lasix 20 mg  Bottle of Flomax 0.4 mg  Bottle of Remeron 15 mg  Bottle of Spironolactone 25 mg   Bottle of Certavite  Bottle of Folic acid 1 mg  Bubble pack of Hydroxyzine  25 mg x 2  Bubble pack of Flomax 0.4 mg    Security Envelope #[id number]  Check #[phone]  Payment coupons #1-12    Admission:  I am responsible for any personal items that are not sent to the safe or pharmacy. [facility] is not responsible for loss, theft or damage of any property in my possession.        Patient Signature: ___________________________________________      Date/Time:__________________________     Staff Signature: __________________________________      Date/Time:__________________________     2nd Staff person, if patient is unable/unwilling to sign:      __________________________________________________________      Date/Time: __________________________        Discharge:  [facility] has returned all of my personal belongings:     Patient Signature: ________________________________________     Date/Time: ____________________________________     Staff Signature: ______________________________________      Date/Time:_____________________________________

## 2020-05-02 NOTE — H&P
"[facility]  Psychiatric History and Physical      Patient name: Jim Richard   MRN: [id number]    Age: 65 year old    YOB: 1955    Identifying information:   The patient is a 65 year old  male    Chief complaint:  \" I have no idea why they would even think I was suicidal.  I am not even depressed.\"    History of present illness: The patient has a history of major depressive disorder and alcohol use disorder who was recently admitted to [facility] on March 7, 2020 for management of a GI bleed thought to be secondary to esophagitis in the context of alcohol intoxication and heavy chronic use.  His hospital course was also complicated by alcoholic hepatitis and ascites requiring therapeutic paracentesis and diuretics.  He was seen by the psychiatric consultation service who had managed his mood symptoms with a combination of Cymbalta, Seroquel, and Remeron.  A chemical health assessment had been obtained and he was referred for residential treatment through the [facility] where he was eventually discharged to after achieving medical stability.  Records indicate that he was recently referred to the emergency room for evaluation of depressed mood and suicidal ideation.  In the emergency room, the patient adamantly denied depressed mood and suicidal ideation.  Documentation notes that emergency room staff contacted staff at his treatment facility who had expressed concern that the patient may attempt suicide upon leaving the treatment facility.  There was also mention that the patient's stay of commitment status may be in the process of revocation by his .  The patient was placed under a 72-hour hold and transferred to our behavioral health unit.  On examination today, the patient continues to deny any knowledge regarding why there would be concerns regarding his mood and suicide risk.  He perceives his mood " as being euthymic and adamantly denied any suicidal intent or plan or discussing these topics with staff to trigger elevated concern.  In summary, he perceives his admission to the hospital as being erroneous.  He reviews with me that he had maintained several weeks of sobriety while receiving treatment at [facility] and several additional weeks prior to that while in the hospital, accumulating approximately 2 months of sobriety.  He was preparing to graduate from his residential treatment program in the next week with plans to transition back to his town home where he resides with a roommate while attending outpatient treatment through [facility].  No new psychosocial stressors reported.    Psychiatric Review of Systems:    -- Depressive episode: Denied symptoms including denial of suicidal and homicidal thoughts.  Denied vegetative symptoms.  Denied anhedonia.  Denied feeling helpless or hopeless.  -- Uma:  denies symptoms  -- Psychosis:  denies symptoms  -- Anxiety: denies symptoms corresponding to MARIA FERNANDA or panic disorder  -- PTSD: denies symptoms  -- OCD: denies  symptoms  -- Eating disorder: denies symptoms    Psychiatric History:    Patient has an established diagnosis of major depressive disorder.  This is his first inpatient psychiatric hospitalization.  No prior suicide attempts reported although prior documentation states he had reported 1 remote attempt in the past.  He has received antidepressant treatment in the past, most recently being switched from Cymbalta to Trintellix while in treatment.    Substance Use History:    Drug of choice is alcohol with pattern of usage corresponding to dependence, further reporting tolerance, loss of control over usage, progressive usage, drinking to the point of blackout and intoxication, developing various medical consequences related to his usage, some of which have included liver cirrhosis, ascites, esophageal varices, and various functional limitations related to his  use.  He has been to detox and treatment in the past, most recently at the [facility].  He is currently under a stay of commitment for treatment of chemical dependency.  He denied a history of withdrawal seizures or DTs.  No recent illicit substance usage reported.    Medical History:  Defer to internal medicine consult;    No current facility-administered medications on file prior to encounter.   fluticasone-vilanterol (BREO ELLIPTA) 200-25 MCG/INH inhaler, Inhale 1 puff into the lungs daily  folic acid (FOLVITE) 1 MG tablet, Take 1 tablet (1 mg) by mouth daily  furosemide (LASIX) 20 MG tablet, Take 1 tablet (20 mg) by mouth daily  hydrOXYzine (ATARAX) 25 MG tablet, Take 1 tablet (25 mg) by mouth every 6 hours as needed for anxiety  mirtazapine (REMERON) 15 MG tablet, Take 1 tablet (15 mg) by mouth At Bedtime  multivitamin w/minerals (THERA-VIT-M) tablet, Take 1 tablet by mouth daily  QUEtiapine (SEROQUEL) 50 MG tablet, Take 1 tablet (50 mg) by mouth nightly as needed (sleep)  spironolactone (ALDACTONE) 25 MG tablet, Take 1 tablet (25 mg) by mouth daily  tamsulosin (FLOMAX) 0.4 MG capsule, Take 1 capsule (0.4 mg) by mouth every evening  traZODone (DESYREL) 100 MG tablet, Take 1 tablet (100 mg) by mouth nightly as needed for sleep  vortioxetine (TRINTELLIX) 10 MG tablet, Take 10 mg by mouth daily  order for DME, Equipment being ordered: Walker Wheels () and Walker ()  Treatment Diagnosis: difficulty walking         Social History:  Refer to the psychosocial assessment completed by our .     Family History:    The patient denied a family history of mental illnesses or suicide attempts    Medical review of systems: 10 systems were reviewed and positive for psychiatric symptoms as noted above otherwise negative    Physical Exam:    B/P: 123/74, T: 98.4, P: 60, R: 16  Estimated body mass index is 28.98 kg/m  as calculated from the following:    Height as of this  "encounter: 1.702 m (5' 7\").    Weight as of this encounter: 83.9 kg (185 lb).    The rest of the physical examination was reviewed in the emergency room note completed by the emergency room physician.      Mental status examination:  Appearance:  Alert, fair hygiene, no acute distress  Attitude:  Attempts to be cooperative  Eye Contact: Fair  Mood: \"Fine\"  Affect: Mood congruent, full, appropriate.  He did not appear overtly depressed or irritable.  He was not tearful.  He was not angry.  He did not appear manic.  Speech:  Normal rates, tone, latency, volume. Not pushed or pressured.  Psychomotor Behavior:  No psychomotor abnormalities noted  Thought Process: Linear and logical; not tangential or circumstantial or disorganized  Associations:  Logical; no loose associations Noted  Thought Content:  No obvious paranoia, delusions, ideas of reference, or grandiosity noted. Denies auditory or visual hallucinations. Denies suicidal Ideations. Denies homicidal ideations.  Insight:  Fair  Judgment:  Fair  Oriented to:  time, person, and place  Attention Span and Concentration:  Intact  Recent and Remote Memory: Intact based on interviewing and details provided  Language: Appropriate based on interviewing  Fund of Knowledge: Appropriate based on interviewing  Muscle Strength and Tone: Normal upon visual inspection  Gait and Station: Normal upon visual inspection            Diagnoses:    Major depressive disorder-recurrent, mild  Alcohol use disorder, in early remission  Hepatic cirrhosis with ascites  BPH  Chronic kidney disease     Plan:  1.  The patient has been admitted to our behavioral health unit under a 72-hour hold for evaluation of his acute suicide risk given safety concerns noted by staff at his residential chemical dependency treatment facility.  Records imply that his  may be planning on revoking his current stay of commitment status.  We will plan to coordinate his legal status with his community "  and obtain an update on Monday.    2.  His prior to admission medications have been resumed which include Trental X for management of his major depressive disorder.  His mood currently appears euthymic and he is denying suicidal ideation.  We will continue to monitor his mood stability while evaluating his acute risk for suicide.    3. Psychosocial treatments to be addressed with social work consult and groups.  Plan to obtain collateral information from his residential treatment facility to clarify specific reasoning for the elevated concerns prompting his hospitalization.      Visit/Communication Style   VIRTUAL (VIDEO) communication was used to evaluate Jim due to the COVID pandemic.    Jim consented to the use of video communication: yes  Video START time: 8:15 AM, 5/2/2020  Video STOP time: 8:37 AM, 5/2/2020   Patient's location: [facility]   Provider's location during the visit: Home

## 2020-05-02 NOTE — PROVIDER NOTIFICATION
Elevated /84 at 1907, 174/87 at 2015.  Pt has a hx of HTN.  Pt is agitated with the current circumstances.  Denies HA, dizziness, chest pain, nausea, visual disturbances. His upper  extremeties are tremulous.  Hospitalist service notified.  Hydralazine 25 mg ordered.

## 2020-05-02 NOTE — PROGRESS NOTES
Pt was admitted on a health officer hold from [facility] ED for depression with suicidal ideation, although the patient did sign in voluntarily.  Per chart review, the patient was sent by Dr. Radn from [facility] after they felt they were unable to manage the patient's medical conditions at the sober house and may require further monitoring. The patient's  was also contacted by Dr. Rand and will reportedly be revoking the patient's stay of commitment. Dr. Rand reports that the patient was displaying increased symptoms of depression and may try to kill himself after leaving the treatment center. Pt states his last drink was February 5th. During the admission pt denied any current or past suicidal ideation.  Verbally contracts for safety while on the unit.  Pt is uncertain why he is being admitted and frustrated.  Denied auditory or visual hallucinations.  The only physical complaint pt reported is lower back pain in the morning, ameliorated with movement. BP upon admission 170/84, will re-check.  Hx HTN, however pt reports it is never this high and attributes it to the current circumstances. Pt say he has not used his CPAP in 3 months.  Declined foam wedges to elevate the HOB, but  Would like extra pillows.     Nursing assessment complete including patient and medication profiles. Risk assessments completed addressing suicide,fall,skin,nutrition and safety issues. Care plan initiated. Assessments reviewed with physician and admit orders received. Video monitoring in progress, Patient Informed.  Welcome packet reviewed with patient. Information reviewed includes getting emergency help, preventing infections, understanding your care, using medication safely, reducing falls, preventing pressure ulcers, smoking cessation, powerful choices and Patients Bill of Rights. Pt. given tour of the unit and instruction on use of facility including emergency call light.  Program schedule reviewed with patient. Questions regarding the unit addressed. Pt. Search completed and belongings inventoried.

## 2020-05-02 NOTE — PROGRESS NOTES
VASQUEZ Bellamy from [facility] called inquiring about pt (AG signed). She was told by Dr. Rand that pt would be coming back to [facility] tonight. Pt will stay overnight and Dr. Rand is to be notified tomorrow if pt is safe to return to [facility].

## 2020-05-03 ENCOUNTER — APPOINTMENT (OUTPATIENT)
Dept: ULTRASOUND IMAGING | Facility: CLINIC | Age: 65
DRG: 885 | End: 2020-05-03
Attending: HOSPITALIST
Payer: MEDICARE

## 2020-05-03 LAB
AMMONIA PLAS-SCNC: <10 UMOL/L (ref 10–50)
ANION GAP SERPL CALCULATED.3IONS-SCNC: 6 MMOL/L (ref 3–14)
BUN SERPL-MCNC: 27 MG/DL (ref 7–30)
CALCIUM SERPL-MCNC: 8.8 MG/DL (ref 8.5–10.1)
CHLORIDE SERPL-SCNC: 106 MMOL/L (ref 94–109)
CO2 SERPL-SCNC: 23 MMOL/L (ref 20–32)
CREAT SERPL-MCNC: 1.47 MG/DL (ref 0.66–1.25)
GFR SERPL CREATININE-BSD FRML MDRD: 49 ML/MIN/{1.73_M2}
GLUCOSE SERPL-MCNC: 99 MG/DL (ref 70–99)
INR PPP: 1.26 (ref 0.86–1.14)
POTASSIUM SERPL-SCNC: 4.1 MMOL/L (ref 3.4–5.3)
SODIUM SERPL-SCNC: 135 MMOL/L (ref 133–144)

## 2020-05-03 PROCEDURE — 25000125 ZZHC RX 250: Performed by: PSYCHIATRY & NEUROLOGY

## 2020-05-03 PROCEDURE — 82140 ASSAY OF AMMONIA: CPT | Performed by: HOSPITALIST

## 2020-05-03 PROCEDURE — 36415 COLL VENOUS BLD VENIPUNCTURE: CPT | Performed by: HOSPITALIST

## 2020-05-03 PROCEDURE — 12400000 ZZH R&B MH

## 2020-05-03 PROCEDURE — 99232 SBSQ HOSP IP/OBS MODERATE 35: CPT | Performed by: HOSPITALIST

## 2020-05-03 PROCEDURE — 27210190 US PARACENTESIS

## 2020-05-03 PROCEDURE — 85610 PROTHROMBIN TIME: CPT | Performed by: HOSPITALIST

## 2020-05-03 PROCEDURE — 25000132 ZZH RX MED GY IP 250 OP 250 PS 637: Mod: GY | Performed by: HOSPITALIST

## 2020-05-03 PROCEDURE — 0W9G3ZZ DRAINAGE OF PERITONEAL CAVITY, PERCUTANEOUS APPROACH: ICD-10-PCS | Performed by: RADIOLOGY

## 2020-05-03 PROCEDURE — 80048 BASIC METABOLIC PNL TOTAL CA: CPT | Performed by: HOSPITALIST

## 2020-05-03 PROCEDURE — 25000132 ZZH RX MED GY IP 250 OP 250 PS 637: Mod: GY | Performed by: PSYCHIATRY & NEUROLOGY

## 2020-05-03 RX ORDER — NICOTINE POLACRILEX 4 MG
15-30 LOZENGE BUCCAL
Status: DISCONTINUED | OUTPATIENT
Start: 2020-05-03 | End: 2020-05-04 | Stop reason: HOSPADM

## 2020-05-03 RX ORDER — SPIRONOLACTONE 25 MG/1
50 TABLET ORAL DAILY
Status: DISCONTINUED | OUTPATIENT
Start: 2020-05-04 | End: 2020-05-04 | Stop reason: HOSPADM

## 2020-05-03 RX ORDER — LIDOCAINE HYDROCHLORIDE 10 MG/ML
10 INJECTION, SOLUTION EPIDURAL; INFILTRATION; INTRACAUDAL; PERINEURAL ONCE
Status: COMPLETED | OUTPATIENT
Start: 2020-05-03 | End: 2020-05-03

## 2020-05-03 RX ORDER — FUROSEMIDE 20 MG
40 TABLET ORAL DAILY
Status: DISCONTINUED | OUTPATIENT
Start: 2020-05-04 | End: 2020-05-04 | Stop reason: HOSPADM

## 2020-05-03 RX ORDER — DEXTROSE MONOHYDRATE 25 G/50ML
25-50 INJECTION, SOLUTION INTRAVENOUS
Status: DISCONTINUED | OUTPATIENT
Start: 2020-05-03 | End: 2020-05-04 | Stop reason: HOSPADM

## 2020-05-03 RX ADMIN — PANTOPRAZOLE SODIUM 40 MG: 40 TABLET, DELAYED RELEASE ORAL at 08:02

## 2020-05-03 RX ADMIN — MULTIPLE VITAMINS W/ MINERALS TAB 1 TABLET: TAB at 08:02

## 2020-05-03 RX ADMIN — QUETIAPINE FUMARATE 50 MG: 50 TABLET ORAL at 21:22

## 2020-05-03 RX ADMIN — FLUTICASONE FUROATE AND VILANTEROL TRIFENATATE 1 PUFF: 200; 25 POWDER RESPIRATORY (INHALATION) at 08:38

## 2020-05-03 RX ADMIN — VORTIOXETINE 10 MG: 10 TABLET, FILM COATED ORAL at 08:02

## 2020-05-03 RX ADMIN — NICOTINE 1 PATCH: 21 PATCH, EXTENDED RELEASE TRANSDERMAL at 07:59

## 2020-05-03 RX ADMIN — HYDROXYZINE HYDROCHLORIDE 25 MG: 25 TABLET ORAL at 21:22

## 2020-05-03 RX ADMIN — FOLIC ACID 1 MG: 1 TABLET ORAL at 08:02

## 2020-05-03 RX ADMIN — MIRTAZAPINE 15 MG: 15 TABLET, FILM COATED ORAL at 21:22

## 2020-05-03 RX ADMIN — SPIRONOLACTONE 25 MG: 25 TABLET ORAL at 08:02

## 2020-05-03 RX ADMIN — LACTULOSE 10 G: 10 SOLUTION ORAL at 08:02

## 2020-05-03 RX ADMIN — FUROSEMIDE 20 MG: 20 TABLET ORAL at 08:02

## 2020-05-03 RX ADMIN — HYDROXYZINE HYDROCHLORIDE 25 MG: 25 TABLET ORAL at 14:24

## 2020-05-03 RX ADMIN — TAMSULOSIN HYDROCHLORIDE 0.4 MG: 0.4 CAPSULE ORAL at 21:22

## 2020-05-03 RX ADMIN — LIDOCAINE HYDROCHLORIDE 10 ML: 10 INJECTION, SOLUTION EPIDURAL; INFILTRATION; INTRACAUDAL; PERINEURAL at 10:46

## 2020-05-03 RX ADMIN — TRAZODONE HYDROCHLORIDE 100 MG: 100 TABLET ORAL at 21:22

## 2020-05-03 RX ADMIN — HYDROXYZINE HYDROCHLORIDE 25 MG: 25 TABLET ORAL at 08:00

## 2020-05-03 ASSESSMENT — ACTIVITIES OF DAILY LIVING (ADL)
HYGIENE/GROOMING: INDEPENDENT
HYGIENE/GROOMING: INDEPENDENT
LAUNDRY: WITH SUPERVISION
DRESS: INDEPENDENT
ORAL_HYGIENE: INDEPENDENT
ORAL_HYGIENE: INDEPENDENT
DRESS: INDEPENDENT;SCRUBS (BEHAVIORAL HEALTH)

## 2020-05-03 ASSESSMENT — MIFFLIN-ST. JEOR: SCORE: 1579.6

## 2020-05-03 NOTE — PROCEDURES
[facility]    Procedure: Paracentesis    Date/Time: 5/3/2020 10:54 AM  Performed by: Troy High MD  Authorized by: Troy High MD     UNIVERSAL PROTOCOL   Site Marked: NA  Prior Images Obtained and Reviewed:  Yes  Required items: Required blood products, implants, devices and special equipment available    Patient identity confirmed:  Verbally with patient, arm band, provided demographic data and hospital-assigned identification number  Patient was reevaluated immediately before administering moderate or deep sedation or anesthesia  Confirmation Checklist:  Patient's identity using two indicators, relevant allergies, procedure was appropriate and matched the consent or emergent situation and correct equipment/implants were available  Time out: Immediately prior to the procedure a time out was called    Universal Protocol: the Joint Commission Universal Protocol was followed    Preparation: Patient was prepped and draped in usual sterile fashion           ANESTHESIA    Anesthesia: Local infiltration  Local Anesthetic:  Lidocaine 1% without epinephrine      SEDATION    Patient Sedated: No    See dictated procedure note for full details.  Findings: US guided paracentesis in LLQ.  Clear yellow fluid removed.  No complications.    Specimens: none    Complications: None    Condition: Stable    PROCEDURE   Patient Tolerance:  Patient tolerated the procedure well with no immediate complications    Length of time physician/provider present for 1:1 monitoring during sedation: 0

## 2020-05-03 NOTE — PROGRESS NOTES
Dr. Meraz explained risks/benefits of procedure to pt and then got consent.  Removed 5100 ml of an re colored ascites

## 2020-05-03 NOTE — PLAN OF CARE
Problem: Depressive Symptoms  Goal: Depressive Symptoms  Description: Signs and symptoms of listed problems will be absent or manageable.  Flowsheets (Taken 5/3/2020 3476)  Depressive Symptoms Assessed: all  Depressive Symptoms Present:   anxiety   mood  Note: Patient denies suicidal ideation.  Patient anxious to leave but informed he needs to stay until we speak to Dr. Rand.  Had a paracentesis today. Dressing dry and intact. Patient cooperative but anxious.  Stays in his room and takes walks in the muniz.

## 2020-05-03 NOTE — PROGRESS NOTES
[facility]    Medicine Progress Note - Hospitalist Service       Date of Admission:  5/1/2020  Assessment & Plan   Jim Richard is a 65 year old male admitted to the mental health unit on 5/1/2020. He has a history of alcohol dependence in remission, alcoholic cirrhosis with ascites, CAD, JANIS, depression and anxiety. The hospitalist service was asked to see patient for medical management.    Hospitalist service will sign off. Call for any further issues.    Cirrhosis with ascites  Esophageal varices  Chronic anemia and thrombocytopenia   Hepatic encephalopathy  Patient had tense ascites on admission. Last paracentesis was almost 3 weeks ago.  - IR paracentesis today. ~ 5L removed. Pt seen after procedure. He is doing well.  - Hgb and platelets are stable  - continue lasix and spironolactone - blood pressure can tolerate increase in dosages, which may help make paracenteses less frequent. Would recheck creat in about a week given changes.  - change diet to low sodium  - restart PPI   - patient self-d/c'd lactulose about a week ago. Ammonia level is fine. No signs of encephalopathy. Okay to discontinue.    CKD3  Baseline creat ~ 1.4. Suspect hepatorenal syndrome.  - avoid nephrotoxins     Tremor: pt says tremor is baseline. Improved today. Ammonia not elevated.  - could consider propranolol if this is truly essential tremor     BPH: continue flomax     Depression with history of suicidal ideation  Anxiety  - continue PTA meds  - defer any further treatment to psychiatry     History of alcohol abuse in remission  Last drink was in March prior to prolonged admission at this hospital.       Diet: 2 Gram Sodium Diet    DVT Prophylaxis: Ambulate every shift  Leger Catheter: not present  Code Status: Full Code      Disposition Plan   Expected discharge: defer to primary service.  Entered: Ursula Owen MD 05/03/2020, 9:07 AM       The patient's care was discussed with the Patient.    Ursula  MD Lexie  Hospitalist Service  [facility]    ______________________________________________________________________    Interval History   Patient feeling a bit sore but abdomen is softer after paracentesis today. He has normal pleasant affect again. Conversant. No major complaints. Hoping for discharge tomorrow.    Data reviewed today: I reviewed all medications, new labs and imaging results over the last 24 hours. I personally reviewed no images or EKG's today.    Physical Exam   Vital Signs: Temp: 98.5  F (36.9  C) Temp src: Oral BP: (!) 149/72   Heart Rate: 61 Resp: 16 SpO2: 96 % O2 Device: None (Room air)    Weight: 185 lbs 0 oz  Constitutional: awake, alert, cooperative, no apparent distress, and appears stated age  Respiratory: No increased work of breathing, good air exchange, clear to auscultation bilaterally, no crackles or wheezing  Cardiovascular: Normal apical impulse, regular rate and rhythm, normal S1 and S2, no S3 or S4, and no murmur noted  GI: No scars, normal bowel sounds, soft, non-distended, non-tender, no masses palpated, no hepatosplenomegally  Musculoskeletal: There is no redness, warmth, or swelling of the joints.  Full range of motion noted.  Motor strength is 5 out of 5 all extremities bilaterally.  Tone is normal.  Neurologic: Awake, alert, oriented to name, place and time.  Cranial nerves II-XII are grossly intact.  Ambulating.  Neuropsychiatric: General: normal, calm and normal eye contact  Affect: normal and pleasant    Data   Recent Labs   Lab 05/03/20  0738 05/01/20  1525   WBC  --  5.7   HGB  --  9.0*   MCV  --  89   PLT  --  180   INR 1.26*  --     135   POTASSIUM 4.1 4.1   CHLORIDE 106 106   CO2 23 24   BUN 27 28   CR 1.47* 1.49*   ANIONGAP 6 5   KEV 8.8 8.4*   GLC 99 90   ALBUMIN  --  2.9*   PROTTOTAL  --  6.9   BILITOTAL  --  0.7   ALKPHOS  --  226*   ALT  --  25   AST  --  27     No results found for this or any previous visit (from the past 24  hour(s)).  Medications       fluticasone-vilanterol  1 puff Inhalation Daily     folic acid  1 mg Oral Daily     furosemide  20 mg Oral Daily     lactulose  10 g Oral Daily     mirtazapine  15 mg Oral At Bedtime     multivitamin w/minerals  1 tablet Oral Daily     nicotine  1 patch Transdermal Daily     nicotine   Transdermal Q8H     pantoprazole  40 mg Oral QAM AC     spironolactone  25 mg Oral Daily     tamsulosin  0.4 mg Oral QPM     vortioxetine  10 mg Oral Daily

## 2020-05-04 VITALS
OXYGEN SATURATION: 100 % | TEMPERATURE: 99.4 F | RESPIRATION RATE: 17 BRPM | WEIGHT: 185.4 LBS | HEART RATE: 59 BPM | HEIGHT: 67 IN | SYSTOLIC BLOOD PRESSURE: 137 MMHG | BODY MASS INDEX: 29.1 KG/M2 | DIASTOLIC BLOOD PRESSURE: 75 MMHG

## 2020-05-04 PROCEDURE — 99239 HOSP IP/OBS DSCHRG MGMT >30: CPT | Mod: 95 | Performed by: PSYCHIATRY & NEUROLOGY

## 2020-05-04 PROCEDURE — 25000132 ZZH RX MED GY IP 250 OP 250 PS 637: Mod: GY | Performed by: PSYCHIATRY & NEUROLOGY

## 2020-05-04 PROCEDURE — 25000132 ZZH RX MED GY IP 250 OP 250 PS 637: Mod: GY | Performed by: HOSPITALIST

## 2020-05-04 RX ORDER — TRAZODONE HYDROCHLORIDE 100 MG/1
100 TABLET ORAL AT BEDTIME
Qty: 30 TABLET | Refills: 0 | Status: ON HOLD | OUTPATIENT
Start: 2020-05-04 | End: 2020-07-01

## 2020-05-04 RX ORDER — TAMSULOSIN HYDROCHLORIDE 0.4 MG/1
0.4 CAPSULE ORAL EVERY EVENING
Qty: 30 CAPSULE | Refills: 0 | Status: ON HOLD | OUTPATIENT
Start: 2020-05-04 | End: 2020-07-01

## 2020-05-04 RX ORDER — QUETIAPINE FUMARATE 50 MG/1
50 TABLET, FILM COATED ORAL
Qty: 30 TABLET | Refills: 0 | Status: ON HOLD | OUTPATIENT
Start: 2020-05-04 | End: 2020-07-01

## 2020-05-04 RX ORDER — FUROSEMIDE 40 MG
40 TABLET ORAL DAILY
Qty: 30 TABLET | Refills: 0 | Status: ON HOLD | OUTPATIENT
Start: 2020-05-05 | End: 2020-07-01

## 2020-05-04 RX ORDER — MIRTAZAPINE 15 MG/1
15 TABLET, FILM COATED ORAL AT BEDTIME
Qty: 30 TABLET | Refills: 0 | Status: ON HOLD | OUTPATIENT
Start: 2020-05-04 | End: 2020-07-01

## 2020-05-04 RX ORDER — SPIRONOLACTONE 50 MG/1
50 TABLET, FILM COATED ORAL DAILY
Qty: 30 TABLET | Refills: 0 | Status: ON HOLD | OUTPATIENT
Start: 2020-05-05 | End: 2020-07-01

## 2020-05-04 RX ORDER — PANTOPRAZOLE SODIUM 40 MG/1
40 TABLET, DELAYED RELEASE ORAL
Qty: 30 TABLET | Refills: 0 | Status: ON HOLD | OUTPATIENT
Start: 2020-05-05 | End: 2020-07-01

## 2020-05-04 RX ADMIN — HYDROXYZINE HYDROCHLORIDE 25 MG: 25 TABLET ORAL at 13:43

## 2020-05-04 RX ADMIN — SPIRONOLACTONE 50 MG: 25 TABLET ORAL at 07:38

## 2020-05-04 RX ADMIN — VORTIOXETINE 10 MG: 10 TABLET, FILM COATED ORAL at 07:38

## 2020-05-04 RX ADMIN — PANTOPRAZOLE SODIUM 40 MG: 40 TABLET, DELAYED RELEASE ORAL at 06:52

## 2020-05-04 RX ADMIN — FOLIC ACID 1 MG: 1 TABLET ORAL at 07:38

## 2020-05-04 RX ADMIN — NICOTINE 1 PATCH: 21 PATCH, EXTENDED RELEASE TRANSDERMAL at 06:52

## 2020-05-04 RX ADMIN — FUROSEMIDE 40 MG: 20 TABLET ORAL at 07:38

## 2020-05-04 RX ADMIN — MULTIPLE VITAMINS W/ MINERALS TAB 1 TABLET: TAB at 07:38

## 2020-05-04 RX ADMIN — FLUTICASONE FUROATE AND VILANTEROL TRIFENATATE 1 PUFF: 200; 25 POWDER RESPIRATORY (INHALATION) at 07:42

## 2020-05-04 ASSESSMENT — ACTIVITIES OF DAILY LIVING (ADL)
DRESS: INDEPENDENT
LAUNDRY: WITH SUPERVISION
ORAL_HYGIENE: INDEPENDENT
HYGIENE/GROOMING: INDEPENDENT

## 2020-05-04 NOTE — PROGRESS NOTES
05/04/20 1300   General Information   Has Not Attended OT as of: 05/04/20     Pt has not attended OT since admit.  Will continue to encourage participation and completion of  self-assessment as able. OT staff will explain the purpose of being involved with treatment plan and provide options to meet current needs and goals.

## 2020-05-04 NOTE — DISCHARGE INSTRUCTIONS
Behavioral Discharge Planning and Instructions    Summary: Admitted for psychiatric stabilization.     Main Diagnosis:    Major Depressive Disorder-recurrent, mild  Alcohol Use Disorder, in early remission  Hepatic cirrhosis with ascites  BPH  Chronic kidney disease    Major Treatments, Procedures and Findings: Psychiatric assessment. Medication adjustment.     Symptoms to Report: losing more sleep, mood getting worse or thoughts of suicide    Lifestyle Adjustment:  Follow all treatment recommendations. Develop and follow safety plan. Your safety plan is your contract with yourself to keep you safe in a crisis. Be sure to use the resources and crisis numbers listed on your safety plan.  Abstain from the use of all mood-altering substances, including alcohol, as usage may increase symptoms of depression. Maintain sobriety by attending daily AA or NA meetings (online) and speaking with your sponsor daily starting on day of discharge.     Psychiatry Follow-up:     You have a follow up psychiatry appointment with Dr. Brody Rand at [facility] in [place] on Tuesday, May 26, 2020 at 11:30 am. This will be a telemedicine appointment. They will mail instructions to you on how to connect with Dr. Rand for this appointment. It is important that you keep this appointment so that you can get your medications refilled.     [facility]  [address], [address]  [place]  [address]  Phone:  [phone] / Fax:  [phone]    Please follow up with your primary care provider, Dr. Talon Elias, at [facility] in [place], as soon as possible following discharge. No appointment has been made with your provider at this time.     [facility]  [address],  [address]  [place] [address]  Phone: [phone]    You remain under a provisional discharge of your civil commitment in [facility] through September 23, 2020. Please continue  working with your county , Omayra Angela. She can be reached at [phone]. Her conditions for you discharging home today are as follows:     1. You agree to check in with your county  at least three times a week or more often if she feels that is necessary.   2. You agree to meet with her in person (observing appropriate social distancing) at a time that she sets up to meet with you.   3. You complete the outpatient treatment program at [facility] in [place], and you remain abstinent from alcohol.   4. You keep all psychiatry appointments and stay on all prescribed medications.     If you do not follow all of the above provisions, your provisional discharge could be revoked and you could be returned to the hospital to await placement at a state CARE facility.     Resources:   Crisis Intervention: 593.776.6215 or 382-709-0744 (TTY: 297.701.2695).  Call anytime for help.  National [place] on Mental Illness (www.[place].debra.org): 526.806.2971 or 532-362-2784.  Alcoholics Anonymous (www.alcoholics-anonymous.org): Check your phone book for your local chapter.  National Suicide Prevention Line (www.mentalhealthmn.org): 236-277-FKXY (3680)  COPE (Crisis Services for Adults) in [facility]: 432.590.1999 (Available 24/7)    General Medication Instructions:   See your medication sheet(s) for instructions.   Take all medicines as directed.  Make no changes unless your doctor suggests them.   Go to all your doctor visits.  Be sure to have all your required lab tests. This way, your medicines can be refilled on time.  Do not use any drugs not prescribed by your doctor.  Avoid alcohol.    Thank you for choosing [facility]. The treatment team has appreciated the opportunity to work with you. If you have any questions following discharge, you can call [address] at [phone] or you can reach out to your outpatient provider.

## 2020-05-04 NOTE — PROGRESS NOTES
Patient denies suicidal ideation. Reviewed discharge instructions with the patient and the patient verbalizes understanding of them.  Patient has a copy of discharge instructions. Meds ordered at [facility] but needed to be transferred to [facility] in [place] per insurance.  Discharged to home via cab at 1440.

## 2020-05-04 NOTE — PLAN OF CARE
Pt has been up ad eliane throughout the evening. Presents a flat affect but brightens easily upon approach and communication. Has mainly been in the lounge watching movies and conversing with peers and staff. Attends groups and participates accordingly. Respectful. Pt is looking forward to moving on after 77.

## 2020-05-04 NOTE — DISCHARGE SUMMARY
[facility]  Department of Psychiatry    DATE OF ADMISSION:  5/1/2020    DATE OF DISCHARGE:  May 4, 2020    DISCHARGE DIAGNOSES:   Major depressive disorder-recurrent, mild  Alcohol use disorder, in early remission  Hepatic cirrhosis with ascites  BPH  Chronic kidney disease    HOSPITAL COURSE: (Refer to H&P, progress notes, and consult notes for details)    The patient was admitted to our Behavioral Health Unit under a health officer hold from the emergency room for evaluation of depressed mood and suicidal thoughts which were noted to be concerns directed from staff at his residential treatment facility.  In the emergency room, the patient had denied depressed mood or suicidal thoughts.  Noting that the patient was under a stay of commitment and there was initially some question of whether his  was going to revoke his stay of commitment complicated the decision to discharge him from the emergency room.  He was admitted to the behavioral health unit for further evaluation of these concerns while allowing time to gain clarification on his legal status with his county .  His prior to admission medications were resumed which included Trintellix for mood management.  His mood remained euthymic throughout his hospital stay and he continued to deny suicidal thoughts.  He did not engage in any hostile or self-injurious behaviors.  He expressed concern for his physical health and worked with the internal medicine team on managing chronic medical conditions.  The patient reported that he was near a graduation of his residential treatment program and was planning on transitioning back home while beginning outpatient treatment through the [facility].  He attended to his self-care needs.  He slept well.  He ate meals adequately.  He did not display any vegetative symptoms of a depressive disorder.  Monday morning, his treatment team was able to gain additional information from his  treatment facility as well as his .  It was determined that his stay of commitment for chemical dependency treatment will remain unchanged.  His  supported discharge back to the community to resume his treatment plan which involved transitioning to outpatient CD treatment.  His care was then transitioned back to his outpatient providers.    Mood and anxiety-related symptoms had improved by the time of discharge and the patient denied suicidal and homicidal thoughts, plans, or intent.  Treatment team felt the patient was stable and ready for discharge.    No restraints or seclusions were required during their hospitalization.  No allergies or severe adverse reactions to the medications were noted.    Other interventions received during his hospitalization included:   Psychosocial treatments were addressed with groups, social work consult, and supportive milieu provided by staff.    CONDITION AT DISCHARGE:  Improved.  The patients acute suicide risk is low due to the following factors:  improved mood/anxiety symptoms.  Denies suicidal ideations. Denies psychotic symptoms.  Not actively intoxicated and plans to abstain from illicit substances and alcohol.  Denies access to guns.  Denies feeling hopeless or helpless. At the time of discharge Jim Richard was determined to not be an immediate danger to herself or others. The patient's acute risk will be higher if noncompliant with treatment plan, medications, follow-up or using illicit substances or alcohol.  These findings along with the risks of noncompliance with medications and treatment plan, which could potentially cause decompensation and increase the risk for suicide, were discussed with the patient.  The patients chronic suicide risk is moderate given the following factors: diagnosis of MDD, history of chemical dependency; Denied a family history of suicide.  Preventative factors include: social supports, stable  housing     MENTAL STATUS EXAMINATION AT TIME OF DISCHARGE:  The patient is 65 year old White male who appears their stated age and is appropriately dressed with good hygiene.  Calm and cooperative with the interview questions.  No psychomotor abnormalities are noted. Eye contact is appropriate. Speech has normal rate, tone, latency and volume and is not pushed or pressured. Mood is euthymic and affect is full and appropriate.  The patient does not seem overtly depressed, anxious, manic or irritable.  Thought process is linear, logical and future oriented.  Thought process is not tangential, circumstantial or disorganized.  Thought content is not significant for apperant paranoia, delusions, ideas of reference or grandiosity.  The patient denies suicidal and homicidal ideations as well as auditory and visual hallucinations.  Insight and judgment are fair.  Cognition appears intact to interviewing including orientation, recent and remote memory, fund of knowledge, use of language, attention span and concentration.  Muscle strength, tone and gait appear normal on visual inspection.      DISPOSITION:  The patient is discharged home while under a stay of commitment for chemical dependency treatment.    FOLLOWUP APPOINTMENTS:  ( per social workers notes and after visit summary)  You have a follow up psychiatry appointment with Dr. Brody Rand at [facility] in [place] on Tuesday, May 26, 2020 at 11:30 am. This will be a telemedicine appointment. They will mail instructions to you on how to connect with Dr. Rand for this appointment. It is important that you keep this appointment so that you can get your medications refilled.      [facility]  [address], [address]  [place]  [address]  Phone:  [phone] / Fax:  [phone]      DISCHARGE MEDICATIONS:   Current Discharge Medication List      START taking these medications    Details   pantoprazole (PROTONIX) 40 MG EC  tablet Take 1 tablet (40 mg) by mouth every morning (before breakfast)  Qty: 30 tablet, Refills: 0    Associated Diagnoses: Gastroesophageal reflux disease without esophagitis         CONTINUE these medications which have CHANGED    Details   fluticasone-vilanterol (BREO ELLIPTA) 200-25 MCG/INH inhaler Inhale 1 puff into the lungs daily  Qty: 28 each, Refills: 0    Associated Diagnoses: Pulmonary emphysema, unspecified emphysema type (H)      furosemide (LASIX) 40 MG tablet Take 1 tablet (40 mg) by mouth daily  Qty: 30 tablet, Refills: 0    Associated Diagnoses: Alcoholic cirrhosis of liver with ascites (H)      mirtazapine (REMERON) 15 MG tablet Take 1 tablet (15 mg) by mouth At Bedtime  Qty: 30 tablet, Refills: 0    Associated Diagnoses: Alcohol withdrawal syndrome without complication (H)      QUEtiapine (SEROQUEL) 50 MG tablet Take 1 tablet (50 mg) by mouth nightly as needed (sleep)  Qty: 30 tablet, Refills: 0    Associated Diagnoses: Depression, unspecified depression type      spironolactone (ALDACTONE) 50 MG tablet Take 1 tablet (50 mg) by mouth daily  Qty: 30 tablet, Refills: 0    Associated Diagnoses: Alcoholic cirrhosis of liver with ascites (H)      tamsulosin (FLOMAX) 0.4 MG capsule Take 1 capsule (0.4 mg) by mouth every evening  Qty: 30 capsule, Refills: 0    Associated Diagnoses: Benign prostatic hyperplasia, unspecified whether lower urinary tract symptoms present      traZODone (DESYREL) 100 MG tablet Take 1 tablet (100 mg) by mouth At Bedtime  Qty: 30 tablet, Refills: 0    Associated Diagnoses: Depression, unspecified depression type      vortioxetine (TRINTELLIX) 10 MG tablet Take 1 tablet (10 mg) by mouth daily  Qty: 30 tablet, Refills: 0    Associated Diagnoses: Severe episode of recurrent major depressive disorder, without psychotic features (H)         CONTINUE these medications which have NOT CHANGED    Details   multivitamin w/minerals (THERA-VIT-M) tablet Take 1 tablet by mouth daily  Qty:  30 each, Refills: 0    Associated Diagnoses: Alcoholic intoxication without complication (H)      order for DME Equipment being ordered: Walker Wheels () and Walker ()  Treatment Diagnosis: difficulty walking  Qty: 1 each, Refills: 0    Associated Diagnoses: Spinal stenosis, lumbar region, with neurogenic claudication         STOP taking these medications       folic acid (FOLVITE) 1 MG tablet Comments:   Reason for Stopping:         hydrOXYzine (ATARAX) 25 MG tablet Comments:   Reason for Stopping:                LABORATORY RESULTS: (past 14 days)  Recent Results (from the past 336 hour(s))   CBC with platelets differential    Collection Time: 05/01/20  3:25 PM   Result Value Ref Range    WBC 5.7 4.0 - 11.0 10e9/L    RBC Count 3.07 (L) 4.4 - 5.9 10e12/L    Hemoglobin 9.0 (L) 13.3 - 17.7 g/dL    Hematocrit 27.2 (L) 40.0 - 53.0 %    MCV 89 78 - 100 fl    MCH 29.3 26.5 - 33.0 pg    MCHC 33.1 31.5 - 36.5 g/dL    RDW 15.6 (H) 10.0 - 15.0 %    Platelet Count 180 150 - 450 10e9/L    Diff Method Automated Method     % Neutrophils 75.9 %    % Lymphocytes 10.0 %    % Monocytes 10.9 %    % Eosinophils 2.5 %    % Basophils 0.5 %    % Immature Granulocytes 0.2 %    Absolute Neutrophil 4.3 1.6 - 8.3 10e9/L    Absolute Lymphocytes 0.6 (L) 0.8 - 5.3 10e9/L    Absolute Monocytes 0.6 0.0 - 1.3 10e9/L    Absolute Eosinophils 0.1 0.0 - 0.7 10e9/L    Absolute Basophils 0.0 0.0 - 0.2 10e9/L    Abs Immature Granulocytes 0.0 0 - 0.4 10e9/L   Comprehensive metabolic panel    Collection Time: 05/01/20  3:25 PM   Result Value Ref Range    Sodium 135 133 - 144 mmol/L    Potassium 4.1 3.4 - 5.3 mmol/L    Chloride 106 94 - 109 mmol/L    Carbon Dioxide 24 20 - 32 mmol/L    Anion Gap 5 3 - 14 mmol/L    Glucose 90 70 - 99 mg/dL    Urea Nitrogen 28 7 - 30 mg/dL    Creatinine 1.49 (H) 0.66 - 1.25 mg/dL    GFR Estimate 48 (L) >60 mL/min/[1.73_m2]    GFR Estimate If Black 56 (L) >60 mL/min/[1.73_m2]    Calcium 8.4 (L) 8.5 - 10.1 mg/dL     Bilirubin Total 0.7 0.2 - 1.3 mg/dL    Albumin 2.9 (L) 3.4 - 5.0 g/dL    Protein Total 6.9 6.8 - 8.8 g/dL    Alkaline Phosphatase 226 (H) 40 - 150 U/L    ALT 25 0 - 70 U/L    AST 27 0 - 45 U/L   TSH with free T4 reflex    Collection Time: 05/01/20  3:25 PM   Result Value Ref Range    TSH 2.10 0.40 - 4.00 mU/L   COVID-19 Virus (Coronavirus) by PCR Nasopharyngeal    Collection Time: 05/01/20  6:27 PM    Specimen: Nasopharyngeal   Result Value Ref Range    COVID-19 Virus PCR to [facility] - Source Nasopharyngeal     COVID-19 Virus PCR to [facility] - Result       Test received-See reflex to IDDL test SARS CoV2 (COVID-19) Virus RT-PCR   SARS-CoV-2 COVID-19 Virus (Coronavirus) RT-PCR Nasopharyngeal    Collection Time: 05/01/20  6:27 PM    Specimen: Nasopharyngeal   Result Value Ref Range    SARS-CoV-2 Virus Specimen Source Nasopharyngeal     SARS-CoV-2 PCR Result NEGATIVE     SARS-CoV-2 PCR Comment       This automated, real-time RT-PCR assay by [employer] on the [employer] has   been given Emergency Use Authorization (EUA) for the in vitro qualitative detection of RNA   from the SARS-CoV2 virus in nasopharyngeal swabs in viral transport medium from patients   with signs and symptoms of infection who are suspected of COVID-19. The performance is   unknown in asymptomatic patients.     Ammonia    Collection Time: 05/03/20  7:38 AM   Result Value Ref Range    Ammonia <10 (L) 10 - 50 umol/L   Basic metabolic panel    Collection Time: 05/03/20  7:38 AM   Result Value Ref Range    Sodium 135 133 - 144 mmol/L    Potassium 4.1 3.4 - 5.3 mmol/L    Chloride 106 94 - 109 mmol/L    Carbon Dioxide 23 20 - 32 mmol/L    Anion Gap 6 3 - 14 mmol/L    Glucose 99 70 - 99 mg/dL    Urea Nitrogen 27 7 - 30 mg/dL    Creatinine 1.47 (H) 0.66 - 1.25 mg/dL    GFR Estimate 49 (L) >60 mL/min/[1.73_m2]    GFR Estimate If Black 57 (L) >60 mL/min/[1.73_m2]    Calcium 8.8 8.5 - 10.1 mg/dL   INR    Collection Time: 05/03/20  7:38 AM   Result  Value Ref Range    INR 1.26 (H) 0.86 - 1.14       >30 minutes was spent on this discharge to allow for reviewing the patient's response to treatment, reviewing plan of care, education on medications and diagnosis, and conducting a risk assessment.    Visit/Communication Style   VIRTUAL (VIDEO) communication was used to evaluate Jim due to the COVID pandemic.    Jim consented to the use of video communication: yes  Video START time: 11:37 AM, 5/4/2020  Video STOP time: 11:45 AM, 5/4/2020   Patient's location: [facility]   Provider's location during the visit: Home

## 2020-05-04 NOTE — PLAN OF CARE
BEHAVIORAL TEAM DISCUSSION    Participants: MD, RN, CM, PA, OT   Progress: Appropriate for discharge  Anticipated length of stay: Planned discharge home today  Continued Stay Criteria/Rationale: N/A  Medical/Physical: Per hospitalist consult  Precautions:    Plan: Discharge home today. Remains on provisional discharge of his CD commitment in [facility]. Will do outpatient CD treatment at [facility] in [place]. Follow up psychiatric care with Dr. Rand at [facility].     Rationale for change in precautions or plan: N/A

## 2020-05-04 NOTE — PROGRESS NOTES
received a call this morning from patient's [facility] , Omayrameg Angela ([phone] /email: [email or web address].). She confirmed that patient is currently on a provisional discharge of his chemical dependency commitment through [facility]. She stated that she would not revoke the provisional discharge as long as patient is cooperative with the treatment plan. She stated that patient has not been home since February. He was in the hospital for two months and then was discharged to residential treatment at [facility] before being readmitted to the hospital. She stated that patient did well during residential treatment and had been ready to graduate from treatment right before he was admitted to the hospital. She stated that she would be in agreement with patient doing outpatient treatment and returning home upon discharge from the hospital. Her first choice would be for patient to do one of the [facility] outpatient CD programs, but she was willing to allow patient to go to [facility] in [place] if he has already been accepted. Her other stipulations for discharge include patient agreeing to speak with [facility]  at least three times a week, meeting with [facility]  in person, completing an outpatient treatment program, and remaining abstinent. If patient does not do the above, she stated that she would revoke the provisional discharge and return patient to the hospital.  met with patient to discuss the above and he was in agreement with all of her stipulations. He was willing to sign a release of information for [facility] so that  could confirm his acceptance to their treatment program. Patient also signed a release of information form for [facility] in [place] so that a follow up appointment for medication management could be made with Dr. Brody Rand.      called and spoke with Femi Sequeira,  treatment coordinator at [facility] ([phone]). He confirmed that patient has been approved for outpatient treatment. He stated that he is planning to call patient tomorrow at 1300 to go over details of the program which will be via telemedicine for now.      called [facility] and set up a follow up psychiatry appointment for patient with Dr. Brody Rand on 05/26/2020 at 1130. This will be via telemedicine as well.      called and spoke with patient's county  to review the discharge plan with her, and she is in agreement with this plan.

## 2020-05-14 ENCOUNTER — HOSPITAL ENCOUNTER (EMERGENCY)
Facility: CLINIC | Age: 65
Discharge: HOME | End: 2020-05-15
Attending: EMERGENCY MEDICINE | Admitting: EMERGENCY MEDICINE
Payer: MEDICARE

## 2020-05-14 DIAGNOSIS — K70.11 ALCOHOLIC HEPATITIS WITH ASCITES (H): ICD-10-CM

## 2020-05-14 DIAGNOSIS — R45.851 SUICIDAL IDEATION: ICD-10-CM

## 2020-05-14 DIAGNOSIS — F10.920 ALCOHOLIC INTOXICATION WITHOUT COMPLICATION (H): ICD-10-CM

## 2020-05-14 LAB
ALBUMIN SERPL-MCNC: 2.8 G/DL (ref 3.4–5)
ALP SERPL-CCNC: 210 U/L (ref 40–150)
ALT SERPL W P-5'-P-CCNC: 26 U/L (ref 0–70)
ANION GAP SERPL CALCULATED.3IONS-SCNC: 9 MMOL/L (ref 3–14)
APAP SERPL-MCNC: <2 MG/L (ref 10–20)
AST SERPL W P-5'-P-CCNC: 45 U/L (ref 0–45)
BASOPHILS # BLD AUTO: 0 10E9/L (ref 0–0.2)
BASOPHILS NFR BLD AUTO: 0.6 %
BILIRUB SERPL-MCNC: 0.7 MG/DL (ref 0.2–1.3)
BUN SERPL-MCNC: 21 MG/DL (ref 7–30)
CALCIUM SERPL-MCNC: 8.1 MG/DL (ref 8.5–10.1)
CHLORIDE SERPL-SCNC: 108 MMOL/L (ref 94–109)
CO2 SERPL-SCNC: 22 MMOL/L (ref 20–32)
CREAT SERPL-MCNC: 1.15 MG/DL (ref 0.66–1.25)
DIFFERENTIAL METHOD BLD: ABNORMAL
EOSINOPHIL # BLD AUTO: 0.1 10E9/L (ref 0–0.7)
EOSINOPHIL NFR BLD AUTO: 1.7 %
ERYTHROCYTE [DISTWIDTH] IN BLOOD BY AUTOMATED COUNT: 16.3 % (ref 10–15)
ETHANOL SERPL-MCNC: 0.27 G/DL
GFR SERPL CREATININE-BSD FRML MDRD: 66 ML/MIN/{1.73_M2}
GLUCOSE SERPL-MCNC: 93 MG/DL (ref 70–99)
HCT VFR BLD AUTO: 28.2 % (ref 40–53)
HGB BLD-MCNC: 9.5 G/DL (ref 13.3–17.7)
IMM GRANULOCYTES # BLD: 0 10E9/L (ref 0–0.4)
IMM GRANULOCYTES NFR BLD: 0.1 %
INR PPP: 1.23 (ref 0.86–1.14)
LYMPHOCYTES # BLD AUTO: 1.3 10E9/L (ref 0.8–5.3)
LYMPHOCYTES NFR BLD AUTO: 18.4 %
MCH RBC QN AUTO: 29.5 PG (ref 26.5–33)
MCHC RBC AUTO-ENTMCNC: 33.7 G/DL (ref 31.5–36.5)
MCV RBC AUTO: 88 FL (ref 78–100)
MONOCYTES # BLD AUTO: 0.5 10E9/L (ref 0–1.3)
MONOCYTES NFR BLD AUTO: 7.5 %
NEUTROPHILS # BLD AUTO: 5 10E9/L (ref 1.6–8.3)
NEUTROPHILS NFR BLD AUTO: 71.7 %
NRBC # BLD AUTO: 0 10*3/UL
NRBC BLD AUTO-RTO: 0 /100
PLATELET # BLD AUTO: 205 10E9/L (ref 150–450)
POTASSIUM SERPL-SCNC: 3.3 MMOL/L (ref 3.4–5.3)
PROT SERPL-MCNC: 7 G/DL (ref 6.8–8.8)
RBC # BLD AUTO: 3.22 10E12/L (ref 4.4–5.9)
SALICYLATES SERPL-MCNC: 2 MG/DL
SODIUM SERPL-SCNC: 139 MMOL/L (ref 133–144)
WBC # BLD AUTO: 7 10E9/L (ref 4–11)

## 2020-05-14 PROCEDURE — 93005 ELECTROCARDIOGRAM TRACING: CPT | Mod: 59

## 2020-05-14 PROCEDURE — 99285 EMERGENCY DEPT VISIT HI MDM: CPT | Mod: 25

## 2020-05-14 PROCEDURE — 80320 DRUG SCREEN QUANTALCOHOLS: CPT | Performed by: EMERGENCY MEDICINE

## 2020-05-14 PROCEDURE — 85025 COMPLETE CBC W/AUTO DIFF WBC: CPT | Performed by: EMERGENCY MEDICINE

## 2020-05-14 PROCEDURE — 80329 ANALGESICS NON-OPIOID 1 OR 2: CPT | Performed by: EMERGENCY MEDICINE

## 2020-05-14 PROCEDURE — 80053 COMPREHEN METABOLIC PANEL: CPT | Performed by: EMERGENCY MEDICINE

## 2020-05-14 PROCEDURE — 49082 ABD PARACENTESIS: CPT

## 2020-05-14 PROCEDURE — 85610 PROTHROMBIN TIME: CPT | Performed by: EMERGENCY MEDICINE

## 2020-05-14 NOTE — ED AVS SNAPSHOT
Emergency Department  [address] [address]  Phone:  [phone]  Fax:  [phone]                                    Jim Richard   MRN: [id number]    Department:   Emergency Department   Date of Visit:  5/14/2020           After Visit Summary Signature Page    I have received my discharge instructions, and my questions have been answered. I have discussed any challenges I see with this plan with the nurse or doctor.    ..........................................................................................................................................  Patient/Patient Representative Signature      ..........................................................................................................................................  Patient Representative Print Name and Relationship to Patient    ..................................................               ................................................  Date                                   Time    ..........................................................................................................................................  Reviewed by Signature/Title    ...................................................              ..............................................  Date                                               Time          22EPIC Rev 08/18

## 2020-05-15 VITALS
TEMPERATURE: 98 F | SYSTOLIC BLOOD PRESSURE: 141 MMHG | DIASTOLIC BLOOD PRESSURE: 81 MMHG | RESPIRATION RATE: 16 BRPM | OXYGEN SATURATION: 95 % | HEART RATE: 68 BPM

## 2020-05-15 LAB
ALBUMIN FLD-MCNC: 0.8 G/DL
APPEARANCE FLD: CLEAR
COLOR FLD: YELLOW
GRAM STN SPEC: NORMAL
INTERPRETATION ECG - MUSE: NORMAL
LYMPHOCYTES NFR FLD MANUAL: 22 %
MONOS+MACROS NFR FLD MANUAL: 23 %
NEUTS BAND NFR FLD MANUAL: 4 %
OTHER CELLS FLD MANUAL: 51 %
PROT FLD-MCNC: 1.7 G/DL
SPECIMEN SOURCE FLD: NORMAL
SPECIMEN SOURCE: NORMAL
WBC # FLD AUTO: 267 /UL

## 2020-05-15 PROCEDURE — 84157 ASSAY OF PROTEIN OTHER: CPT | Performed by: EMERGENCY MEDICINE

## 2020-05-15 PROCEDURE — 87070 CULTURE OTHR SPECIMN AEROBIC: CPT | Performed by: EMERGENCY MEDICINE

## 2020-05-15 PROCEDURE — 90791 PSYCH DIAGNOSTIC EVALUATION: CPT

## 2020-05-15 PROCEDURE — 89051 BODY FLUID CELL COUNT: CPT | Performed by: EMERGENCY MEDICINE

## 2020-05-15 PROCEDURE — 87205 SMEAR GRAM STAIN: CPT | Performed by: EMERGENCY MEDICINE

## 2020-05-15 PROCEDURE — 82042 OTHER SOURCE ALBUMIN QUAN EA: CPT | Performed by: EMERGENCY MEDICINE

## 2020-05-15 NOTE — ED PROVIDER NOTES
History   Chief Complaint:  Alcohol Intoxication and Suicidal     HPI   Jim Richard is a 65 year old male with history of alcohol abuse, ascites, coronary artery disease, depression, among others who presents for evaluation of alcohol intoxication and suicidal ideation. On chart review, the patient was recently discharged after a month-long admission for alcohol abuse treatment. Recently he has missed several appointments to have a paracentesis for his ascites. He states his depression worsened today and he drank a 0.75 mL bottle of vodka throughout the day. This evening he called EMS with a plan for suicide by  and was brought in for evaluation. Prior to EMS arrival, he took a double dose of his seroquel and hydroxyzine medication.    Here, EMS states he has exhibited similar presentation in the past and is a well-known by EMS. The patient repeatedly denies active suicidal ideation. He endorses a decreased appetite secondary to his abdominal pain. He denies any fever, chills, diarrhea, sore throat, or other symptoms prompting his presentation.     Allergies:  Amlodipine  Lisinopril    Medications:   Breo ellipta  Lasix  Remeron  Protonix  Seroquel  Aldactone  Flomax  Desyrel  Trintellix     Past Medical History:    Alcohol abuse  Anxiety   Coronary artery disease  Depression  Esophageal varies with bleeding  MI  Hepatomegaly  Hyperlipidemia  Hypertension  JANIS  Peripheral vascular disease  Subdural hematoma  Spinal stenosis   COPD  Tobacco abuse  Chronic low back pain   Ascites     Past Surgical History:    Appendectomy  Bypass graft femoropopliteal   Colonoscopy x 3  Endarterectomy femoral  EGD x 5  Abdominal hernia repair   Laminectomy, fusion lumbar three+ level  Tonsillectomy   Adenoidectomy      Family History:    Father - Lung cancer, substance abuse  Mother - CAD  Brother - Substance abuse     Social History:  The patient was accompanied to the ED by EMS.  Smoking Status: Current, 1.00  packs/day  Smokeless Tobacco: Never Used  Alcohol Use: Yes  Drug Use: No  PCP: Talon Elias   Lives with a roomate    Review of Systems   Unable to perform ROS: Mental status change (Intoxication)       Physical Exam     Patient Vitals for the past 24 hrs:   BP Pulse Heart Rate Resp SpO2   05/15/20 0022 -- -- 81 18 --   05/15/20 0021 -- -- 79 14 --   05/15/20 0020 -- -- 81 16 --   05/15/20 0019 -- -- 80 14 --   05/15/20 0000 -- -- 80 10 --   05/14/20 2341 -- -- 78 11 --   05/14/20 2325 -- -- 70 10 95 %   05/14/20 2324 -- -- 73 29 95 %   05/14/20 2321 -- -- 73 24 95 %   05/14/20 2315 -- -- 73 10 97 %   05/14/20 2308 (!) 159/82 80 -- -- 96 %     Physical Exam  General: Appears well-developed and well-nourished.   Head: No signs of trauma.   CV: Normal rate and regular rhythm.    Resp: Effort normal and breath sounds normal. No respiratory distress.   GI: Distended with fluid wave.  Mild generalized tenderness without focal findings.  No rebound or guarding.  Normal bowel sounds.  No CVA tenderness.  MSK: Normal range of motion. no edema. No Calf tenderness.  Neuro: The patient is alert and oriented to person, place, and time.  Strength in upper/lower extremities normal and symmetrical. Sensation normal. Speech normal.  Skin: Skin is warm and dry. No rash noted.   Psych: normal mood and affect. behavior is normal.     Emergency Department Course     ECG:  ECG taken at 0028, ECG read at 0034 by Sebastien Gasca MD  Normal sinus rhythm  Right bundle branch block  Left anterior fascicular block   *Bifascicular block*  Septal infarct, age undetermined  Abnormal ECG  Rate 78 bpm. MD interval 144. QRS duration 138. QT/QTc 454/517. P-R-T axes 57 -52 47.      Laboratory:  Laboratory findings were communicated with the patient who voiced understanding of the findings.    CBC: HGB 9.5 (L) o/w WNL (WBC 7.0, )   CMP: Potassium 3.3 (L), Calcium 8.1 (L), Albumin 2.8 (L), Alkphos 210 (H) o/w WNL (Creatinine 1.15)    INR: 1.23 (H)   Acetaminophen level: <2  Salicylate level: 2  Albumin level: Pending.  Alcohol level blood: 0.27 (H)    Cell count with differential fluid: % Neutrophils fluid 4, % Lymphocytes fluid 22, % Mono/Macro Fluid 23, % Other Cells Fluid 51 Lining cells, Color fluid Yellow, Appearance Clear, WBC Fluid 267  Protein fluid: 1.7  Albumin fluid: 0.8  Gram stain: No organisms seen.  Fluid Culture Aerobic Bacterial: Pending.    Procedures:  [facility]    -Paracentesis    Date/Time: 5/15/2020 2:51 AM  Performed by: Sebastien Gasca MD  Authorized by: Sebastien Gasca MD       PRE-PROCEDURE DETAILS     Procedure purpose:  Therapeutic    ANESTHESIA (see MAR for exact dosages):     Anesthesia method:  Local infiltration    Local anesthetic:  Lidocaine 1% w/o epi    PROCEDURE DETAILS     Needle gauge:  22    Ultrasound guidance: yes      Puncture site:  L lower quadrant    Fluid removed amount:  3.3 L    Fluid appearance:  Yellow (Straw)    Dressing:  Adhesive bandage    PROCEDURE   Patient Tolerance:  Patient tolerated the procedure well with no immediate complications  Describe Procedure: Paracentesis kit used with catheter placed into LLQ and needle removed.   No pain or difficulty during paracentesis with improvement of symptoms following.      Emergency Department Course:  Past medical records, nursing notes, and vitals reviewed.  EKG obtained in the ED, see results above.    IV was inserted and blood was drawn for laboratory testing, results above.     (2305)   I performed an exam of the patient as documented above. History obtained from patient.     (0010)   I rechecked the patient and discussed results and plan of care.     (0024)   Patient placed on an ADRIANA hold.     (0100)   DEC paged.    (5398)   I performed the paracentesis procedure, as noted above.     (0522)   I rechecked the patient and discussed results and plan of care.     (8645)   I spoke with the DEC service regarding  patient's presentation, findings, and plan of care.      Findings and plan explained to the Patient. Patient discharged home with instructions regarding supportive care, medications, and reasons to return. The importance of close follow-up was reviewed. I personally reviewed the laboratory results with the Patient and answered all related questions prior to discharge.     Impression & Plan     Medical Decision Making:  Jim Richard is a 65 year old gentleman who presents due to suicidal ideation.  He reports that he had been drinking and apparently called 911 asking them to shoot him.  States that he has been stressed over family issues along with other stressors.  At the time my evaluation, he states the situation has passed and that he is not having any thoughts of suicide.  He has been seen for similar complaints in the past and they seem to subside fairly quickly.  Throughout his time in the ER he continued to deny any thoughts of self-harm.  He was monitored for a number of hours to sober.  He was evaluated by DEC who agreed that the situation seems to have passed at this time.  Patient did request that we drain his ascites, as he feels like he has been filling up once again.  He did complain of some abdominal discomfort, although on chart review this seems to be typical for him and in speaking with him he reports he has had it for many weeks or longer.  Abdomen was large but not significantly tense.  Given his complaint of abdominal discomfort, did do a paracentesis and was able to relieve 3 L after allowing pt to sober in order to obtain consent.  I also sent the fluid for analysis and there is no significant elevation of neutrophils and Gram stain was negative.  I have a low suspicion for SBP.  Cultures were sent for screening and are pending.  Patient was discharged with recommendation to abstain from alcohol and to follow-up with his primary care doctor and GI for continued management of his chronic  symptoms and to return for any further concerns.  I discussed having his PCP/GI set up paracentesis as an outpt as he seems to come to the ER to have these done.    Diagnosis:    ICD-10-CM    1. Suicidal ideation  R45.851    2. Alcoholic hepatitis with ascites  K70.11    3. Alcoholic intoxication without complication (H)  F10.920      Disposition:  Discharged to home.     Scribe Disclosure:  I, Godfrey Eliud, am serving as a scribe at 11:05 PM on 5/14/2020 to document services personally performed by Sebastien Gasca MD based on my observations and the provider's statements to me.  May 14, 2020   [facility] EMERGENCY DEPARTMENT       Sebastien Gasca MD  05/15/20 0648

## 2020-05-15 NOTE — ED TRIAGE NOTES
ETOH. Called 911 with S/I and plan for suicide by . Made statement about ingesting pills but no known ingestion nor evidence on scene. Denies intent currently. Recently discharge from month long commitment for ETOH treatment. History of ascities - has not been drained for while. BG 95

## 2020-05-19 NOTE — ED NOTES
I was asked to contact this patient's PCP to assist this patient with an OP paracentesis plan.  I contact the [facility] PCP and had a message sent to the provider to evaluate this patient for OP paracentesis as he is using the ER for this.  I updated the ER provider.

## 2020-05-20 LAB
BACTERIA SPEC CULT: NO GROWTH
SPECIMEN SOURCE: NORMAL

## 2020-05-26 ENCOUNTER — HOSPITAL ENCOUNTER (EMERGENCY)
Facility: CLINIC | Age: 65
Discharge: HOME | End: 2020-05-26
Attending: EMERGENCY MEDICINE | Admitting: EMERGENCY MEDICINE
Payer: MEDICARE

## 2020-05-26 ENCOUNTER — APPOINTMENT (OUTPATIENT)
Dept: GENERAL RADIOLOGY | Facility: CLINIC | Age: 65
End: 2020-05-26
Attending: EMERGENCY MEDICINE
Payer: MEDICARE

## 2020-05-26 ENCOUNTER — APPOINTMENT (OUTPATIENT)
Dept: ULTRASOUND IMAGING | Facility: CLINIC | Age: 65
End: 2020-05-26
Attending: EMERGENCY MEDICINE
Payer: MEDICARE

## 2020-05-26 VITALS
DIASTOLIC BLOOD PRESSURE: 63 MMHG | RESPIRATION RATE: 18 BRPM | HEART RATE: 75 BPM | BODY MASS INDEX: 29.82 KG/M2 | TEMPERATURE: 99.3 F | OXYGEN SATURATION: 95 % | WEIGHT: 190 LBS | SYSTOLIC BLOOD PRESSURE: 130 MMHG | HEIGHT: 67 IN

## 2020-05-26 DIAGNOSIS — D61.818 PANCYTOPENIA (H): ICD-10-CM

## 2020-05-26 DIAGNOSIS — N28.9 RENAL INSUFFICIENCY: ICD-10-CM

## 2020-05-26 DIAGNOSIS — K70.11 ALCOHOLIC HEPATITIS WITH ASCITES (H): ICD-10-CM

## 2020-05-26 DIAGNOSIS — F10.20 ALCOHOLISM (H): ICD-10-CM

## 2020-05-26 DIAGNOSIS — Z20.822 SUSPECTED COVID-19 VIRUS INFECTION: ICD-10-CM

## 2020-05-26 DIAGNOSIS — R93.89 ABNORMAL CHEST X-RAY: ICD-10-CM

## 2020-05-26 LAB
ALBUMIN FLD-MCNC: 0.7 G/DL
ALBUMIN SERPL-MCNC: 2.9 G/DL (ref 3.4–5)
ALBUMIN UR-MCNC: 30 MG/DL
ALP SERPL-CCNC: 221 U/L (ref 40–150)
ALT SERPL W P-5'-P-CCNC: 29 U/L (ref 0–70)
AMMONIA PLAS-SCNC: <10 UMOL/L (ref 10–50)
ANION GAP SERPL CALCULATED.3IONS-SCNC: 15 MMOL/L (ref 3–14)
APPEARANCE FLD: NORMAL
APPEARANCE UR: CLEAR
AST SERPL W P-5'-P-CCNC: 65 U/L (ref 0–45)
BASOPHILS # BLD AUTO: 0 10E9/L (ref 0–0.2)
BASOPHILS NFR BLD AUTO: 0.4 %
BILIRUB SERPL-MCNC: 1.7 MG/DL (ref 0.2–1.3)
BILIRUB UR QL STRIP: ABNORMAL
BUN SERPL-MCNC: 31 MG/DL (ref 7–30)
CALCIUM SERPL-MCNC: 8.6 MG/DL (ref 8.5–10.1)
CHLORIDE SERPL-SCNC: 100 MMOL/L (ref 94–109)
CO2 SERPL-SCNC: 17 MMOL/L (ref 20–32)
COLOR FLD: YELLOW
COLOR UR AUTO: YELLOW
CREAT SERPL-MCNC: 1.43 MG/DL (ref 0.66–1.25)
DIFFERENTIAL METHOD BLD: ABNORMAL
EOSINOPHIL # BLD AUTO: 0 10E9/L (ref 0–0.7)
EOSINOPHIL NFR BLD AUTO: 0.7 %
ERYTHROCYTE [DISTWIDTH] IN BLOOD BY AUTOMATED COUNT: 16.9 % (ref 10–15)
ETHANOL SERPL-MCNC: <0.01 G/DL
GFR SERPL CREATININE-BSD FRML MDRD: 51 ML/MIN/{1.73_M2}
GLUCOSE FLD-MCNC: 70 MG/DL
GLUCOSE SERPL-MCNC: 75 MG/DL (ref 70–99)
GLUCOSE UR STRIP-MCNC: NEGATIVE MG/DL
GRAM STN SPEC: NORMAL
HCT VFR BLD AUTO: 26.8 % (ref 40–53)
HGB BLD-MCNC: 8.7 G/DL (ref 13.3–17.7)
HGB UR QL STRIP: ABNORMAL
IMM GRANULOCYTES # BLD: 0 10E9/L (ref 0–0.4)
IMM GRANULOCYTES NFR BLD: 0.4 %
INR PPP: 1.3 (ref 0.86–1.14)
INTERPRETATION ECG - MUSE: NORMAL
KETONES UR STRIP-MCNC: 80 MG/DL
LACTATE BLD-SCNC: 1.2 MMOL/L (ref 0.7–2)
LEUKOCYTE ESTERASE UR QL STRIP: NEGATIVE
LIPASE SERPL-CCNC: 346 U/L (ref 73–393)
LYMPHOCYTES # BLD AUTO: 0.3 10E9/L (ref 0.8–5.3)
LYMPHOCYTES NFR BLD AUTO: 12.1 %
LYMPHOCYTES NFR FLD MANUAL: 23 %
MCH RBC QN AUTO: 29.7 PG (ref 26.5–33)
MCHC RBC AUTO-ENTMCNC: 32.5 G/DL (ref 31.5–36.5)
MCV RBC AUTO: 92 FL (ref 78–100)
MONOCYTES # BLD AUTO: 0.4 10E9/L (ref 0–1.3)
MONOCYTES NFR BLD AUTO: 14.2 %
MONOS+MACROS NFR FLD MANUAL: 31 %
MUCOUS THREADS #/AREA URNS LPF: PRESENT /LPF
NEUTROPHILS # BLD AUTO: 2 10E9/L (ref 1.6–8.3)
NEUTROPHILS NFR BLD AUTO: 72.2 %
NEUTS BAND NFR FLD MANUAL: 4 %
NITRATE UR QL: NEGATIVE
NRBC # BLD AUTO: 0 10*3/UL
NRBC BLD AUTO-RTO: 0 /100
OTHER CELLS FLD MANUAL: 42 %
PH UR STRIP: 6 PH (ref 5–7)
PLATELET # BLD AUTO: 78 10E9/L (ref 150–450)
POTASSIUM SERPL-SCNC: 3.6 MMOL/L (ref 3.4–5.3)
PROT FLD-MCNC: 1.5 G/DL
PROT SERPL-MCNC: 7.1 G/DL (ref 6.8–8.8)
RBC # BLD AUTO: 2.93 10E12/L (ref 4.4–5.9)
RBC #/AREA URNS AUTO: 1 /HPF (ref 0–2)
SARS-COV-2 PCR COMMENT: NORMAL
SARS-COV-2 RNA SPEC QL NAA+PROBE: NEGATIVE
SARS-COV-2 RNA SPEC QL NAA+PROBE: NORMAL
SODIUM SERPL-SCNC: 132 MMOL/L (ref 133–144)
SOURCE: ABNORMAL
SP GR UR STRIP: 1.02 (ref 1–1.03)
SPECIMEN SOURCE FLD: NORMAL
SPECIMEN SOURCE: NORMAL
SQUAMOUS #/AREA URNS AUTO: <1 /HPF (ref 0–1)
UROBILINOGEN UR STRIP-MCNC: 4 MG/DL (ref 0–2)
WBC # BLD AUTO: 2.8 10E9/L (ref 4–11)
WBC # FLD AUTO: 222 /UL
WBC #/AREA URNS AUTO: 1 /HPF (ref 0–5)

## 2020-05-26 PROCEDURE — 87015 SPECIMEN INFECT AGNT CONCNTJ: CPT | Performed by: EMERGENCY MEDICINE

## 2020-05-26 PROCEDURE — 84157 ASSAY OF PROTEIN OTHER: CPT | Performed by: EMERGENCY MEDICINE

## 2020-05-26 PROCEDURE — 80053 COMPREHEN METABOLIC PANEL: CPT | Performed by: EMERGENCY MEDICINE

## 2020-05-26 PROCEDURE — 25000125 ZZHC RX 250: Performed by: EMERGENCY MEDICINE

## 2020-05-26 PROCEDURE — 85610 PROTHROMBIN TIME: CPT | Performed by: EMERGENCY MEDICINE

## 2020-05-26 PROCEDURE — 85025 COMPLETE CBC W/AUTO DIFF WBC: CPT | Performed by: EMERGENCY MEDICINE

## 2020-05-26 PROCEDURE — 87040 BLOOD CULTURE FOR BACTERIA: CPT | Performed by: EMERGENCY MEDICINE

## 2020-05-26 PROCEDURE — 81001 URINALYSIS AUTO W/SCOPE: CPT | Mod: 59 | Performed by: EMERGENCY MEDICINE

## 2020-05-26 PROCEDURE — U0003 INFECTIOUS AGENT DETECTION BY NUCLEIC ACID (DNA OR RNA); SEVERE ACUTE RESPIRATORY SYNDROME CORONAVIRUS 2 (SARS-COV-2) (CORONAVIRUS DISEASE [COVID-19]), AMPLIFIED PROBE TECHNIQUE, MAKING USE OF HIGH THROUGHPUT TECHNOLOGIES AS DESCRIBED BY CMS-2020-01-R: HCPCS | Performed by: EMERGENCY MEDICINE

## 2020-05-26 PROCEDURE — 82140 ASSAY OF AMMONIA: CPT | Performed by: EMERGENCY MEDICINE

## 2020-05-26 PROCEDURE — 99285 EMERGENCY DEPT VISIT HI MDM: CPT | Mod: 25

## 2020-05-26 PROCEDURE — 83605 ASSAY OF LACTIC ACID: CPT | Performed by: EMERGENCY MEDICINE

## 2020-05-26 PROCEDURE — 82945 GLUCOSE OTHER FLUID: CPT | Performed by: EMERGENCY MEDICINE

## 2020-05-26 PROCEDURE — 82042 OTHER SOURCE ALBUMIN QUAN EA: CPT | Performed by: EMERGENCY MEDICINE

## 2020-05-26 PROCEDURE — 71045 X-RAY EXAM CHEST 1 VIEW: CPT

## 2020-05-26 PROCEDURE — 83690 ASSAY OF LIPASE: CPT | Performed by: EMERGENCY MEDICINE

## 2020-05-26 PROCEDURE — 49083 ABD PARACENTESIS W/IMAGING: CPT

## 2020-05-26 PROCEDURE — 87205 SMEAR GRAM STAIN: CPT | Performed by: EMERGENCY MEDICINE

## 2020-05-26 PROCEDURE — 93005 ELECTROCARDIOGRAM TRACING: CPT

## 2020-05-26 PROCEDURE — 89051 BODY FLUID CELL COUNT: CPT | Performed by: EMERGENCY MEDICINE

## 2020-05-26 PROCEDURE — 87070 CULTURE OTHR SPECIMN AEROBIC: CPT | Performed by: EMERGENCY MEDICINE

## 2020-05-26 PROCEDURE — 80320 DRUG SCREEN QUANTALCOHOLS: CPT | Performed by: EMERGENCY MEDICINE

## 2020-05-26 RX ORDER — AMOXICILLIN 500 MG/1
1000 CAPSULE ORAL 3 TIMES DAILY
Qty: 42 CAPSULE | Refills: 0 | Status: SHIPPED | OUTPATIENT
Start: 2020-05-26 | End: 2020-06-10

## 2020-05-26 RX ORDER — LIDOCAINE HYDROCHLORIDE 10 MG/ML
10 INJECTION, SOLUTION EPIDURAL; INFILTRATION; INTRACAUDAL; PERINEURAL ONCE
Status: COMPLETED | OUTPATIENT
Start: 2020-05-26 | End: 2020-05-26

## 2020-05-26 RX ORDER — DOXYCYCLINE 100 MG/1
100 CAPSULE ORAL 2 TIMES DAILY
Qty: 14 CAPSULE | Refills: 0 | Status: SHIPPED | OUTPATIENT
Start: 2020-05-26 | End: 2020-06-10

## 2020-05-26 RX ADMIN — LIDOCAINE HYDROCHLORIDE 10 ML: 10 INJECTION, SOLUTION EPIDURAL; INFILTRATION; INTRACAUDAL; PERINEURAL at 13:32

## 2020-05-26 ASSESSMENT — MIFFLIN-ST. JEOR: SCORE: 1600.46

## 2020-05-26 NOTE — ED AVS SNAPSHOT
Emergency Department  [address] [facility] [address]  Phone:  [phone]  Fax:  [phone]                                    Jim Richard   MRN: [id number]    Department:   Emergency Department   Date of Visit:  5/26/2020           After Visit Summary Signature Page    I have received my discharge instructions, and my questions have been answered. I have discussed any challenges I see with this plan with the nurse or doctor.    ..........................................................................................................................................  Patient/Patient Representative Signature      ..........................................................................................................................................  Patient Representative Print Name and Relationship to Patient    ..................................................               ................................................  Date                                   Time    ..........................................................................................................................................  Reviewed by Signature/Title    ...................................................              ..............................................  Date                                               Time          22EPIC Rev 08/18

## 2020-05-26 NOTE — ED TRIAGE NOTES
C/o abd distention. Hx of ascites, was last tapped 1 month ago. Hx etoh and cirrhosis. Also c/o loose stool X 1 wk. Temp of 100.6, last drink about 5 days ago

## 2020-05-26 NOTE — ED NOTES
Bed: ED13  Expected date:   Expected time:   Means of arrival:   Comments:  417 65M abd distension/diarrhea/fever 100.6

## 2020-05-26 NOTE — DISCHARGE INSTRUCTIONS
IT IS ESSENTIAL THAT YOU SCHEDULE YOUR PARACENTESIS THROUGH YOUR PRIMARY CLINIC.  THIS PROCEDURE CANNOT BE DONE ROUTINELY THROUGH THE EMERGENCY DEPARTMENT.  TAKE THE PRESCRIBED ANTIBIOTICS TO TREAT ANY DEGREE OF BACTERIAL PNEUMONIA YOU MAY HAVE.      Discharge Instructions  COVID-19    Your Provider has determined that you should practice self-isolation and self-monitoring in order to protect yourself and your community from COVID-19, which is the disease caused by a new coronavirus. The virus spreads from person to person primarily by droplets when an infected person coughs or sneezes and the droplet either lands on another person or that other person touches a surface with the droplet on it. Diagnosis of COVID-19 can be made with a test but many times the test is unavailable or not necessary. There is no specific treatment or medicine for the disease.    Symptoms of COVID-19  Many people have no symptoms or mild symptoms.  Symptoms may usually appear 4 to 5 days (up to 14 days) after contact with another ill person. Some people will get severe symptoms and pneumonia. Usual symptoms are:     Fever    Cough   Trouble breathing   Less common symptoms are: Headache, body aches, sore    throat, sneezing, diarrhea.    How to Care for Yourself    Stay home.  Most people will recover from illness with mild symptoms.  Isolation by staying home is the best method to prevent the spread of the illness. Do not go to work or school. Have a friend or relative do your shopping. Do not use public transportation (bus, train) or ridesharing (Lyft, Uber).    How long should I stay home?  If you have symptoms of a respiratory disease (fever, cough), you should stay home for at least 7 days, and for 3 days with no fever and improvement of respiratory symptoms--whichever is longer. (Your fever should be gone for 3 days without using fever-reducing medicine.)    For example, if you have a fever and coughing for 4 days, you need to stay  home 3 more days with no fever for a total of 7 days. Or, if you have a fever and coughing for 5 days, you need to stay home 3 more days with no fever for a total of 8 days.    Separate yourself from other people in your home.?As much as possible, you should stay in one room and away from other people in your home. Also, use a separate bathroom, if possible. Avoid handling pets or other animals while sick.     Wear a facemask if you need to be around other people and cover your mouth and nose with a tissue when you cough or sneeze.     Avoid sharing personal household items. You should not share dishes, drinking glasses, forks/knives/spoons, towels, or bedding with other people in your home. After using these items, they should be washed with soap and water. Clean parts of your home that are touched often (doorknobs, faucets, countertops, etc.) daily.     Wash your hands often with soap and water for at least 20 seconds or use an alcohol-based hand  containing at least 60% alcohol.     Avoid touching your face.    Treat your symptoms: Take Acetaminophen (Tylenol) to treat body aches and fever as needed for comfort. Ibuprofen (Advil or Motrin) can be used as well if you still have symptoms after taking Tylenol.  Drink fluids. Rest.    Watch for worsening symptoms, shortness of breath, or difficulty   Breathing.    Employers/workplaces are being asked by the Centers for Disease Control (CDC) to not request notes/documentation for you to return to work or prove that you were ill. You may choose to show your employer this paperwork.    Return to the Emergency Department if:    If you are developing worsening breathing, shortness of breath, or feel worse you should seek medical attention.  If you are uncertain, contact your health care provider/clinic. If you need emergency medical attention, call 911 and tell them you have been ill.

## 2020-05-26 NOTE — ED PROVIDER NOTES
History     Chief Complaint:  Abdominal bloating      HPI   history supplemented by electronic chart review, and EMS.  Jim Richard is a 65 year old male with a history of alcoholic cirrhosis and ascites who presents to the emergency department by EMS for evaluation of abdominal distention.  He states that for the past week, his abdomen is been getting bigger, and he feels that he needs to have fluid taken off.  Chart review shows that he has had therapeutic paracenteses on April 6, May 3 by IR, and a 14 in the emergency department which time 3 L were removed.  He is prescribed Lasix Flomax spironolactone and a PPI.  He is an alcoholic and reports his last alcohol use was 5 days ago, that he has a history of withdrawal but not seizures.  He states he feels slightly short of breath, though he attributes this to his abdominal bloating and having smoked more cigarettes yesterday than usual.  He denies any known sick contacts.  No urinary symptoms or rashes.  No chest pain.  EMS measured a temperature of 100.6.    Allergies:  Amlodipine  Lisinopril      Medications:    Breo Ellipta inhaler   Flomax   Furosemide  Hydroxyzine   Pantoprazole   Remeron   Seroquel   Spironolactone    Trazodone   Trintellix     Past Medical History:    Alcoholism  Anxiety  Coronary artery disease  Depression  Hepatomegaly    Hyperlipidemia   Hypertension  Obstructive sleep apnea  Peripheral vascular disease  Secondary esophageal varices with bleeding   Subdural hematoma   Spinal stenosis    Past Surgical History:    Appendectomy   Bypass graft femoropopliteal   Colonoscopy   Endarterectomy   Esophagoscopy, gastroscopy, duodenoscopy combined   Hernia repair   Laminectomy, fusion lumbar  Tonsillectomy and adenoidectomy      Family History:    Mother - coronary artery disease   Father - substance abuse, lung cancer     Social History:  Smoking Status: Current Smoker  Smokeless Tobacco: Never Used  Alcohol Use: Yes  Drug Use: No  PCP:  "Talon Elias A  Marital Status:        Review of Systems   All other systems reviewed and are negative.    Physical Exam     Patient Vitals for the past 24 hrs:   BP Temp Temp src Pulse Resp SpO2 Height Weight   05/26/20 1519 (!) 143/71 -- -- 77 20 99 % -- --   05/26/20 1500 (!) 149/69 -- -- 89 -- -- -- --   05/26/20 1445 (!) 147/72 -- -- 70 20 100 % -- --   05/26/20 1430 (!) 145/66 -- -- 69 -- 98 % -- --   05/26/20 1415 (!) 146/68 -- -- 69 -- 99 % -- --   05/26/20 1400 (!) 145/71 -- -- 69 -- 99 % -- --   05/26/20 1345 (!) 157/75 -- -- 73 -- 99 % -- --   05/26/20 1200 (!) 152/78 99.3  F (37.4  C) Oral 77 20 98 % 1.702 m (5' 7\") 86.2 kg (190 lb)     Physical Exam  General: Male semirecumbent in room 13  HENT: mucous membranes moist   CV: rate as above, moderate lower extremity edema  Resp: clear throughout, normal effort, no crackles or wheezing  GI: Abdomen soft, markedly distended but not tense, positive fluid wave, no guarding  Nontender external genitalia  MSK: no bony tenderness, no CVAT  Skin: appropriately warm and dry  Neuro: alert, clear speech, oriented   Psych: cooperative    Emergency Department Course     ECG:  ECG taken at 1205, ECG read at 1235  Normal sinus rhythm  Left axis deviation   Right bundle branch block (old)  Abnormal ECG    Rate 77 bpm. IL interval 140 ms. QRS duration 140 ms. QT/QTc 474/536 ms. P-R-T axes 51 -33 36.    Imaging:  Radiology findings were communicated with the patient who voiced understanding of the findings.    US Paracentesis  IMPRESSION:  1.  Status post ultrasound-guided diagnostic and therapeutic paracentesis with removal of 5.6 L of fluid.    Reading per radiology.      XR Chest Port 1 View  IMPRESSION: Portable chest. Hazy groundglass opacity mid right lung could indicate early changes of pneumonia. Left lung is clear. Callus formation overlying the posterior left seventh rib is unchanged. Infiltrates described on prior exam have otherwise resolved. " No  pneumothorax or significant pleural fluid. Heart is normal in size.    Reading per radiology.      Laboratory:  Laboratory findings were communicated with the patient who voiced understanding of the findings.    Lactic acid whole blood(1255): 1.2    CMP: Glucose 75, sodium 132 (L), carbon dioxide 17 (L), anion gap 15 (H), urea nitrogen 31 (H), creatinine 1.43 (H), GFR 51 (L), bilirubin 1.7 (H), albumin 2.9 (L), alkaline phosphatase 221 (H), AST 65 (H), o/w WNL    CBC: WBC: 2.8 (L), HGB: 8.7 (L), PLT: 78 (L)    Lipase: 346    INR: 1.30 (H)    Ammonia: <10 (L)    Alcohol ethyl: <0.01    Protein peritoneal fluid: 1.5    Glucose peritoneal fluid: 70    Albumin peritoneal fluid: 0.7    Symptomatic COVID-19 Virus by PCR: Pending    Cell count with differential fluid: Pending    Gram stain: Pending     Fluid culture aerobic bacterial: Pending     Blood culture: Pending     UA with microscopic: Pending    Interventions:  1332 Xylocaine 1% injection 10 mL    Emergency Department Course:  Past medical records, nursing notes, and vitals reviewed.    1148 I performed an exam of the patient as documented above.     1205 EKG obtained in the ED, see results above.     1217 IV was inserted and blood was drawn for laboratory testing, results above.    1224 The patient provided a urine sample here in the emergency department. This was sent for laboratory testing, findings above.    1219 The patient received a portable chest x-ray and was sent for a ultrasound paracentesis while in the emergency department, results above.     1628 I rechecked the patient and discussed the results of his workup thus far.     Findings and plan explained to the Patient. Patient discharged home with instructions regarding supportive care, medications, and reasons to return. The importance of close follow-up was reviewed. The patient was prescribed amoxicillin and Vibramycin.    I personally reviewed the laboratory and imaging results with the Patient  and answered all related questions prior to discharge.     Impression & Plan     Medical Decision Making:  I think his primary issue today is recurrent abdominal ascites from previous diagnosed alcoholic cirrhosis.  While his history is not highly suggestive of SBP, given his ascites and report of temperature of 100.6 for paramedics, I felt that both therapeutic and diagnostic paracentesis was indicated and this was performed, with results not suggestive of SBP.  He felt short of breath but I think this is primarily mechanical from his large abdominal girth before the procedure, as it improved thereafter.  X-ray was done during this pandemic, and COVID-19 swab was sent, with abnormal results discussed with the patient.  Given his comorbidities I felt that oral antibiotics were reasonable if he has any bleeding symptoms over the next few days, acknowledging that this could be viral or nonbacterial.  He feels improved after the procedure.  I made it very clear to him that paracentesis is not performed routinely from the emergency department, and reiterated yet again that he must coordinate through his primary clinic to arrange this to be done as an outpatient, as he will almost certainly need this again within the next few weeks based on his prior pattern.  No evidence of serious alcohol withdrawal or intoxication at this time.  He was discharged home in improved condition, with recommendation to quarantine given potentially infectious respiratory symptoms.      Covid-19  Jim Richard was evaluated during a global COVID-19 pandemic, which necessitated consideration that the patient might be at risk for infection with the SARS-CoV-2 virus that causes COVID-19.   Applicable protocols for evaluation were followed during the patient's care. COVID-19 was considered as part of the patient's evaluation. The plan for testing is: a test was obtained during this visit.    Diagnosis:    ICD-10-CM    1. Alcoholic hepatitis  with ascites  K70.11    2. Renal insufficiency  N28.9    3. Pancytopenia (H)  D61.818    4. Alcoholism (H)  F10.20    5. Abnormal chest x-ray  R93.89    6. Suspected Covid-19 Virus Infection  Z20.828        Disposition:  Discharged to home.    Discharge Medications:  New Prescriptions    AMOXICILLIN (AMOXIL) 500 MG CAPSULE    Take 2 capsules (1,000 mg) by mouth 3 times daily for 7 days    DOXYCYCLINE HYCLATE (VIBRAMYCIN) 100 MG CAPSULE    Take 1 capsule (100 mg) by mouth 2 times daily for 7 days       Scribe Disclosure:  I, Genevieve Ferrer, am serving as a scribe at 11:54 AM on 5/26/2020 to document services personally performed by Efrain Morgan MD based on my observations and the provider's statements to me.     This record was created at least in part using electronic voice recognition software, so please excuse any typographical errors.         Efrain Morgan MD  05/26/20 5709

## 2020-05-31 LAB
BACTERIA SPEC CULT: NO GROWTH
SPECIMEN SOURCE: NORMAL

## 2020-06-01 LAB
BACTERIA SPEC CULT: NO GROWTH
BACTERIA SPEC CULT: NO GROWTH
SPECIMEN SOURCE: NORMAL
SPECIMEN SOURCE: NORMAL

## 2020-06-10 ENCOUNTER — APPOINTMENT (OUTPATIENT)
Dept: ULTRASOUND IMAGING | Facility: CLINIC | Age: 65
DRG: 432 | End: 2020-06-10
Attending: EMERGENCY MEDICINE
Payer: MEDICARE

## 2020-06-10 ENCOUNTER — TELEPHONE (OUTPATIENT)
Dept: BEHAVIORAL HEALTH | Facility: CLINIC | Age: 65
End: 2020-06-10

## 2020-06-10 ENCOUNTER — HOSPITAL ENCOUNTER (INPATIENT)
Facility: CLINIC | Age: 65
LOS: 22 days | Discharge: PSYCHIATRIC OR SUBSTANCE USE FACILITY | DRG: 432 | End: 2020-07-02
Attending: EMERGENCY MEDICINE | Admitting: HOSPITALIST
Payer: MEDICARE

## 2020-06-10 ENCOUNTER — APPOINTMENT (OUTPATIENT)
Dept: GENERAL RADIOLOGY | Facility: CLINIC | Age: 65
DRG: 432 | End: 2020-06-10
Attending: EMERGENCY MEDICINE
Payer: MEDICARE

## 2020-06-10 DIAGNOSIS — F32.A ANXIETY AND DEPRESSION: ICD-10-CM

## 2020-06-10 DIAGNOSIS — F41.9 ANXIETY AND DEPRESSION: ICD-10-CM

## 2020-06-10 DIAGNOSIS — F10.920 ALCOHOLIC INTOXICATION WITHOUT COMPLICATION (H): ICD-10-CM

## 2020-06-10 DIAGNOSIS — R39.198 DIFFICULTY VOIDING: ICD-10-CM

## 2020-06-10 DIAGNOSIS — N28.9 RENAL INSUFFICIENCY: ICD-10-CM

## 2020-06-10 DIAGNOSIS — Z02.89 PAIN MEDICATION AGREEMENT: ICD-10-CM

## 2020-06-10 DIAGNOSIS — K92.1 MELENA: ICD-10-CM

## 2020-06-10 DIAGNOSIS — R45.851 SUICIDAL IDEATION: ICD-10-CM

## 2020-06-10 DIAGNOSIS — K92.0 HEMATEMESIS, PRESENCE OF NAUSEA NOT SPECIFIED: ICD-10-CM

## 2020-06-10 DIAGNOSIS — K70.31 ALCOHOLIC CIRRHOSIS OF LIVER WITH ASCITES (H): Primary | ICD-10-CM

## 2020-06-10 DIAGNOSIS — F32.A DEPRESSION, UNSPECIFIED DEPRESSION TYPE: ICD-10-CM

## 2020-06-10 DIAGNOSIS — F33.2 SEVERE EPISODE OF RECURRENT MAJOR DEPRESSIVE DISORDER, WITHOUT PSYCHOTIC FEATURES (H): ICD-10-CM

## 2020-06-10 DIAGNOSIS — J44.9 CHRONIC OBSTRUCTIVE PULMONARY DISEASE, UNSPECIFIED COPD TYPE (H): ICD-10-CM

## 2020-06-10 LAB
ALBUMIN SERPL-MCNC: 2.7 G/DL (ref 3.4–5)
ALCOHOL BREATH TEST: 0.06 (ref 0–0.01)
ALP SERPL-CCNC: 239 U/L (ref 40–150)
ALT SERPL W P-5'-P-CCNC: 30 U/L (ref 0–70)
AMPHETAMINES UR QL SCN: NEGATIVE
ANION GAP SERPL CALCULATED.3IONS-SCNC: 11 MMOL/L (ref 3–14)
AST SERPL W P-5'-P-CCNC: 89 U/L (ref 0–45)
BARBITURATES UR QL: NEGATIVE
BASOPHILS # BLD AUTO: 0.1 10E9/L (ref 0–0.2)
BASOPHILS NFR BLD AUTO: 1 %
BENZODIAZ UR QL: NEGATIVE
BILIRUB SERPL-MCNC: 1 MG/DL (ref 0.2–1.3)
BUN SERPL-MCNC: 34 MG/DL (ref 7–30)
CALCIUM SERPL-MCNC: 8 MG/DL (ref 8.5–10.1)
CANNABINOIDS UR QL SCN: NEGATIVE
CHLORIDE SERPL-SCNC: 106 MMOL/L (ref 94–109)
CO2 SERPL-SCNC: 23 MMOL/L (ref 20–32)
COCAINE UR QL: NEGATIVE
CREAT SERPL-MCNC: 1.72 MG/DL (ref 0.66–1.25)
DIFFERENTIAL METHOD BLD: ABNORMAL
EOSINOPHIL # BLD AUTO: 0 10E9/L (ref 0–0.7)
EOSINOPHIL NFR BLD AUTO: 0 %
ERYTHROCYTE [DISTWIDTH] IN BLOOD BY AUTOMATED COUNT: 16.4 % (ref 10–15)
ETHANOL SERPL-MCNC: 0.32 G/DL
GFR SERPL CREATININE-BSD FRML MDRD: 40 ML/MIN/{1.73_M2}
GLUCOSE SERPL-MCNC: 85 MG/DL (ref 70–99)
HCT VFR BLD AUTO: 28.3 % (ref 40–53)
HEMOCCULT STL QL: POSITIVE
HGB BLD-MCNC: 9 G/DL (ref 13.3–17.7)
HGB BLD-MCNC: 9.6 G/DL (ref 13.3–17.7)
IMM GRANULOCYTES # BLD: 0 10E9/L (ref 0–0.4)
IMM GRANULOCYTES NFR BLD: 0 %
INR PPP: 1.22 (ref 0.86–1.14)
LYMPHOCYTES # BLD AUTO: 0.9 10E9/L (ref 0.8–5.3)
LYMPHOCYTES NFR BLD AUTO: 14.8 %
MCH RBC QN AUTO: 30.3 PG (ref 26.5–33)
MCHC RBC AUTO-ENTMCNC: 33.9 G/DL (ref 31.5–36.5)
MCV RBC AUTO: 89 FL (ref 78–100)
MONOCYTES # BLD AUTO: 0.4 10E9/L (ref 0–1.3)
MONOCYTES NFR BLD AUTO: 7.5 %
NEUTROPHILS # BLD AUTO: 4.5 10E9/L (ref 1.6–8.3)
NEUTROPHILS NFR BLD AUTO: 76.7 %
NRBC # BLD AUTO: 0 10*3/UL
NRBC BLD AUTO-RTO: 0 /100
OPIATES UR QL SCN: NEGATIVE
PCP UR QL SCN: NEGATIVE
PLATELET # BLD AUTO: 161 10E9/L (ref 150–450)
POTASSIUM SERPL-SCNC: 4.1 MMOL/L (ref 3.4–5.3)
PROT SERPL-MCNC: 7 G/DL (ref 6.8–8.8)
RBC # BLD AUTO: 3.17 10E12/L (ref 4.4–5.9)
SARS-COV-2 PCR COMMENT: NORMAL
SARS-COV-2 RNA SPEC QL NAA+PROBE: NEGATIVE
SARS-COV-2 RNA SPEC QL NAA+PROBE: NORMAL
SODIUM SERPL-SCNC: 140 MMOL/L (ref 133–144)
SPECIMEN SOURCE: NORMAL
SPECIMEN SOURCE: NORMAL
TROPONIN I SERPL-MCNC: 0.02 UG/L (ref 0–0.04)
WBC # BLD AUTO: 5.9 10E9/L (ref 4–11)

## 2020-06-10 PROCEDURE — 25800030 ZZH RX IP 258 OP 636: Performed by: HOSPITALIST

## 2020-06-10 PROCEDURE — 80320 DRUG SCREEN QUANTALCOHOLS: CPT | Performed by: EMERGENCY MEDICINE

## 2020-06-10 PROCEDURE — 36415 COLL VENOUS BLD VENIPUNCTURE: CPT | Performed by: HOSPITALIST

## 2020-06-10 PROCEDURE — 25000125 ZZHC RX 250: Performed by: HOSPITALIST

## 2020-06-10 PROCEDURE — 0W9G3ZZ DRAINAGE OF PERITONEAL CAVITY, PERCUTANEOUS APPROACH: ICD-10-PCS | Performed by: RADIOLOGY

## 2020-06-10 PROCEDURE — 25800025 ZZH RX 258: Performed by: EMERGENCY MEDICINE

## 2020-06-10 PROCEDURE — 82272 OCCULT BLD FECES 1-3 TESTS: CPT | Performed by: EMERGENCY MEDICINE

## 2020-06-10 PROCEDURE — 40000863 ZZH STATISTIC RADIOLOGY XRAY, US, CT, MAR, NM

## 2020-06-10 PROCEDURE — 25000132 ZZH RX MED GY IP 250 OP 250 PS 637: Mod: GY | Performed by: HOSPITALIST

## 2020-06-10 PROCEDURE — 25000128 H RX IP 250 OP 636

## 2020-06-10 PROCEDURE — 25000128 H RX IP 250 OP 636: Performed by: EMERGENCY MEDICINE

## 2020-06-10 PROCEDURE — 85018 HEMOGLOBIN: CPT | Performed by: HOSPITALIST

## 2020-06-10 PROCEDURE — 71045 X-RAY EXAM CHEST 1 VIEW: CPT

## 2020-06-10 PROCEDURE — 25000125 ZZHC RX 250: Performed by: EMERGENCY MEDICINE

## 2020-06-10 PROCEDURE — 27210190 US PARACENTESIS

## 2020-06-10 PROCEDURE — 85610 PROTHROMBIN TIME: CPT | Performed by: EMERGENCY MEDICINE

## 2020-06-10 PROCEDURE — 99223 1ST HOSP IP/OBS HIGH 75: CPT | Mod: AI | Performed by: HOSPITALIST

## 2020-06-10 PROCEDURE — 85025 COMPLETE CBC W/AUTO DIFF WBC: CPT | Performed by: EMERGENCY MEDICINE

## 2020-06-10 PROCEDURE — C9113 INJ PANTOPRAZOLE SODIUM, VIA: HCPCS | Performed by: EMERGENCY MEDICINE

## 2020-06-10 PROCEDURE — 25000128 H RX IP 250 OP 636: Performed by: HOSPITALIST

## 2020-06-10 PROCEDURE — U0003 INFECTIOUS AGENT DETECTION BY NUCLEIC ACID (DNA OR RNA); SEVERE ACUTE RESPIRATORY SYNDROME CORONAVIRUS 2 (SARS-COV-2) (CORONAVIRUS DISEASE [COVID-19]), AMPLIFIED PROBE TECHNIQUE, MAKING USE OF HIGH THROUGHPUT TECHNOLOGIES AS DESCRIBED BY CMS-2020-01-R: HCPCS | Performed by: EMERGENCY MEDICINE

## 2020-06-10 PROCEDURE — 80307 DRUG TEST PRSMV CHEM ANLYZR: CPT | Performed by: EMERGENCY MEDICINE

## 2020-06-10 PROCEDURE — 25000132 ZZH RX MED GY IP 250 OP 250 PS 637: Mod: GY | Performed by: EMERGENCY MEDICINE

## 2020-06-10 PROCEDURE — 12000000 ZZH R&B MED SURG/OB

## 2020-06-10 PROCEDURE — 80053 COMPREHEN METABOLIC PANEL: CPT | Performed by: EMERGENCY MEDICINE

## 2020-06-10 PROCEDURE — 99285 EMERGENCY DEPT VISIT HI MDM: CPT | Mod: 25

## 2020-06-10 PROCEDURE — 96375 TX/PRO/DX INJ NEW DRUG ADDON: CPT

## 2020-06-10 PROCEDURE — 96366 THER/PROPH/DIAG IV INF ADDON: CPT

## 2020-06-10 PROCEDURE — 90791 PSYCH DIAGNOSTIC EVALUATION: CPT

## 2020-06-10 PROCEDURE — 96365 THER/PROPH/DIAG IV INF INIT: CPT

## 2020-06-10 PROCEDURE — HZ2ZZZZ DETOXIFICATION SERVICES FOR SUBSTANCE ABUSE TREATMENT: ICD-10-PCS | Performed by: HOSPITALIST

## 2020-06-10 PROCEDURE — 84484 ASSAY OF TROPONIN QUANT: CPT | Performed by: EMERGENCY MEDICINE

## 2020-06-10 RX ORDER — LANOLIN ALCOHOL/MO/W.PET/CERES
100 CREAM (GRAM) TOPICAL DAILY
Status: COMPLETED | OUTPATIENT
Start: 2020-06-11 | End: 2020-06-14

## 2020-06-10 RX ORDER — NALOXONE HYDROCHLORIDE 0.4 MG/ML
.1-.4 INJECTION, SOLUTION INTRAMUSCULAR; INTRAVENOUS; SUBCUTANEOUS
Status: DISCONTINUED | OUTPATIENT
Start: 2020-06-10 | End: 2020-07-02 | Stop reason: HOSPADM

## 2020-06-10 RX ORDER — TAMSULOSIN HYDROCHLORIDE 0.4 MG/1
0.4 CAPSULE ORAL EVERY EVENING
Status: DISCONTINUED | OUTPATIENT
Start: 2020-06-10 | End: 2020-07-02 | Stop reason: HOSPADM

## 2020-06-10 RX ORDER — DIAZEPAM 10 MG/2ML
5-10 INJECTION, SOLUTION INTRAMUSCULAR; INTRAVENOUS EVERY 30 MIN PRN
Status: DISCONTINUED | OUTPATIENT
Start: 2020-06-10 | End: 2020-06-19

## 2020-06-10 RX ORDER — ONDANSETRON 2 MG/ML
INJECTION INTRAMUSCULAR; INTRAVENOUS
Status: COMPLETED
Start: 2020-06-10 | End: 2020-06-10

## 2020-06-10 RX ORDER — ONDANSETRON 4 MG/1
4 TABLET, ORALLY DISINTEGRATING ORAL EVERY 6 HOURS PRN
Status: DISCONTINUED | OUTPATIENT
Start: 2020-06-10 | End: 2020-07-02 | Stop reason: HOSPADM

## 2020-06-10 RX ORDER — FOLIC ACID 1 MG/1
1 TABLET ORAL DAILY
Status: DISCONTINUED | OUTPATIENT
Start: 2020-06-11 | End: 2020-07-02 | Stop reason: HOSPADM

## 2020-06-10 RX ORDER — LIDOCAINE 40 MG/G
CREAM TOPICAL
Status: DISCONTINUED | OUTPATIENT
Start: 2020-06-10 | End: 2020-06-30

## 2020-06-10 RX ORDER — ONDANSETRON 2 MG/ML
4 INJECTION INTRAMUSCULAR; INTRAVENOUS EVERY 6 HOURS PRN
Status: DISCONTINUED | OUTPATIENT
Start: 2020-06-10 | End: 2020-07-02 | Stop reason: HOSPADM

## 2020-06-10 RX ORDER — PROCHLORPERAZINE 25 MG
12.5 SUPPOSITORY, RECTAL RECTAL EVERY 12 HOURS PRN
Status: DISCONTINUED | OUTPATIENT
Start: 2020-06-10 | End: 2020-07-02 | Stop reason: HOSPADM

## 2020-06-10 RX ORDER — SODIUM CHLORIDE 9 MG/ML
INJECTION, SOLUTION INTRAVENOUS CONTINUOUS
Status: ACTIVE | OUTPATIENT
Start: 2020-06-10 | End: 2020-06-11

## 2020-06-10 RX ORDER — CEFTRIAXONE 1 G/1
1 INJECTION, POWDER, FOR SOLUTION INTRAMUSCULAR; INTRAVENOUS EVERY 24 HOURS
Status: DISCONTINUED | OUTPATIENT
Start: 2020-06-10 | End: 2020-06-13

## 2020-06-10 RX ORDER — PROCHLORPERAZINE MALEATE 5 MG
5 TABLET ORAL EVERY 6 HOURS PRN
Status: DISCONTINUED | OUTPATIENT
Start: 2020-06-10 | End: 2020-07-02 | Stop reason: HOSPADM

## 2020-06-10 RX ORDER — DIAZEPAM 5 MG
10 TABLET ORAL ONCE
Status: COMPLETED | OUTPATIENT
Start: 2020-06-10 | End: 2020-06-10

## 2020-06-10 RX ORDER — MULTIPLE VITAMINS W/ MINERALS TAB 9MG-400MCG
1 TAB ORAL DAILY
Status: DISCONTINUED | OUTPATIENT
Start: 2020-06-11 | End: 2020-07-02 | Stop reason: HOSPADM

## 2020-06-10 RX ORDER — QUETIAPINE FUMARATE 25 MG/1
25-50 TABLET, FILM COATED ORAL EVERY 6 HOURS PRN
Status: DISCONTINUED | OUTPATIENT
Start: 2020-06-10 | End: 2020-06-18

## 2020-06-10 RX ORDER — QUETIAPINE FUMARATE 50 MG/1
50 TABLET, FILM COATED ORAL
Status: DISCONTINUED | OUTPATIENT
Start: 2020-06-10 | End: 2020-07-02 | Stop reason: HOSPADM

## 2020-06-10 RX ORDER — LIDOCAINE HYDROCHLORIDE 10 MG/ML
10 INJECTION, SOLUTION EPIDURAL; INFILTRATION; INTRACAUDAL; PERINEURAL ONCE
Status: COMPLETED | OUTPATIENT
Start: 2020-06-10 | End: 2020-06-10

## 2020-06-10 RX ORDER — MIRTAZAPINE 15 MG/1
15 TABLET, FILM COATED ORAL AT BEDTIME
Status: DISCONTINUED | OUTPATIENT
Start: 2020-06-10 | End: 2020-06-18

## 2020-06-10 RX ORDER — TRAZODONE HYDROCHLORIDE 50 MG/1
100 TABLET, FILM COATED ORAL AT BEDTIME
Status: DISCONTINUED | OUTPATIENT
Start: 2020-06-10 | End: 2020-07-02 | Stop reason: HOSPADM

## 2020-06-10 RX ORDER — DIAZEPAM 5 MG
10 TABLET ORAL EVERY 30 MIN PRN
Status: DISCONTINUED | OUTPATIENT
Start: 2020-06-10 | End: 2020-06-19

## 2020-06-10 RX ORDER — HYDROXYZINE HYDROCHLORIDE 25 MG/1
50 TABLET, FILM COATED ORAL ONCE
Status: COMPLETED | OUTPATIENT
Start: 2020-06-10 | End: 2020-06-10

## 2020-06-10 RX ORDER — ONDANSETRON 2 MG/ML
4 INJECTION INTRAMUSCULAR; INTRAVENOUS ONCE
Status: COMPLETED | OUTPATIENT
Start: 2020-06-10 | End: 2020-06-10

## 2020-06-10 RX ORDER — DIAZEPAM 5 MG
5 TABLET ORAL ONCE
Status: COMPLETED | OUTPATIENT
Start: 2020-06-10 | End: 2020-06-10

## 2020-06-10 RX ORDER — CLONIDINE HYDROCHLORIDE 0.1 MG/1
0.1 TABLET ORAL 2 TIMES DAILY
Status: DISCONTINUED | OUTPATIENT
Start: 2020-06-10 | End: 2020-06-14

## 2020-06-10 RX ADMIN — CEFTRIAXONE SODIUM 1 G: 1 INJECTION, POWDER, FOR SOLUTION INTRAMUSCULAR; INTRAVENOUS at 19:38

## 2020-06-10 RX ADMIN — DIAZEPAM 10 MG: 5 TABLET ORAL at 19:36

## 2020-06-10 RX ADMIN — CLONIDINE HYDROCHLORIDE 0.1 MG: 0.1 TABLET ORAL at 21:43

## 2020-06-10 RX ADMIN — ONDANSETRON 4 MG: 2 INJECTION INTRAMUSCULAR; INTRAVENOUS at 11:30

## 2020-06-10 RX ADMIN — TRAZODONE HYDROCHLORIDE 100 MG: 50 TABLET ORAL at 22:23

## 2020-06-10 RX ADMIN — PANTOPRAZOLE SODIUM 40 MG: 40 INJECTION, POWDER, FOR SOLUTION INTRAVENOUS at 17:06

## 2020-06-10 RX ADMIN — TAMSULOSIN HYDROCHLORIDE 0.4 MG: 0.4 CAPSULE ORAL at 19:36

## 2020-06-10 RX ADMIN — LIDOCAINE HYDROCHLORIDE 10 ML: 10 INJECTION, SOLUTION EPIDURAL; INFILTRATION; INTRACAUDAL; PERINEURAL at 11:56

## 2020-06-10 RX ADMIN — DIAZEPAM 5 MG: 5 TABLET ORAL at 17:07

## 2020-06-10 RX ADMIN — MIRTAZAPINE 15 MG: 15 TABLET, FILM COATED ORAL at 21:43

## 2020-06-10 RX ADMIN — FLUTICASONE FUROATE AND VILANTEROL TRIFENATATE 1 PUFF: 200; 25 POWDER RESPIRATORY (INHALATION) at 21:51

## 2020-06-10 RX ADMIN — DIAZEPAM 10 MG: 5 TABLET ORAL at 21:50

## 2020-06-10 RX ADMIN — FOLIC ACID: 5 INJECTION, SOLUTION INTRAMUSCULAR; INTRAVENOUS; SUBCUTANEOUS at 03:35

## 2020-06-10 RX ADMIN — FOLIC ACID: 5 INJECTION, SOLUTION INTRAMUSCULAR; INTRAVENOUS; SUBCUTANEOUS at 21:44

## 2020-06-10 RX ADMIN — DIAZEPAM 10 MG: 5 TABLET ORAL at 13:27

## 2020-06-10 RX ADMIN — HYDROXYZINE HYDROCHLORIDE 50 MG: 25 TABLET ORAL at 02:12

## 2020-06-10 ASSESSMENT — ENCOUNTER SYMPTOMS
COUGH: 0
VOMITING: 1
SORE THROAT: 0
DIARRHEA: 0
ABDOMINAL PAIN: 0
FEVER: 0
SHORTNESS OF BREATH: 0
SEIZURES: 0

## 2020-06-10 ASSESSMENT — ACTIVITIES OF DAILY LIVING (ADL)
RETIRED_COMMUNICATION: 0-->UNDERSTANDS/COMMUNICATES WITHOUT DIFFICULTY
COGNITION: 0 - NO COGNITION ISSUES REPORTED
BATHING: 0-->INDEPENDENT
ADLS_ACUITY_SCORE: 13
DRESS: 0-->INDEPENDENT
AMBULATION: 0-->INDEPENDENT
TRANSFERRING: 0-->INDEPENDENT
SWALLOWING: 0-->SWALLOWS FOODS/LIQUIDS WITHOUT DIFFICULTY
TOILETING: 0-->INDEPENDENT
FALL_HISTORY_WITHIN_LAST_SIX_MONTHS: NO
RETIRED_EATING: 0-->INDEPENDENT

## 2020-06-10 ASSESSMENT — MIFFLIN-ST. JEOR: SCORE: 1600.46

## 2020-06-10 NOTE — PROGRESS NOTES
Patient to US for paracentesis.  Removed 4 L yellow fluid from right abdomen.  VS WNL, patient denies pain, and plan to return to ED.  Monitor.

## 2020-06-10 NOTE — ED PROVIDER NOTES
Jim Richard is a 65 year old male who was signed out to me for admission for GI bleed.  Patient has had shortness of breath.  He reports of black tarry stools.  Occult was positive.  Patient is an alcoholic and had his stay of commitment revoked.  He requires medical admission for medical clearance for psychiatric admission.    ECG:  Indication: shortness of breath  ECG taken at 1613, ECG read at 1623 by Dr. Adriana Deleon MD  Normal sinus rhythm  Left axis deviation  Right bundle branch block  Abnormal ECG  No significant changes compared to EKG dated 05/26/2020  Rate 71 bpm. IL interval 150. QRS duration 136. QT/QTc 470/510. P-R-T axes 74 -35 29.    Emergency Department Course:  Past medical records, nursing notes, and vitals reviewed.    (1622)   I spoke with Dr. Koenig of the Hospitalist service regarding patient's presentation, findings, and plan of care, who agrees to accept patient for further care, evaluation and monitoring.     Findings and plan explained to the Patient who consents to admission. Discussed the patient with Dr. Koenig, who will admit the patient to a bed for further monitoring, evaluation, and treatment.    I personally reviewed the laboratory and imaging results with the Patient and answered all related questions prior to admission.     Impression & Plan   Medical Decision Making:  Complex medical history of a patient who was signed out to myself.  Concern for possible GI bleed with a positive occult.  Although patient is not having significant bleeding at this time and is vitally stable.  Stable hemoglobin.  Protonix was administered.  Low suspicion for significant GI bleed at this time.  EKG is reassuring with normal troponin.  Chest x-ray shows no evidence acute pneumonia, pneumothorax or effusion.  No other notable findings.  Patient is short of breath and COVID testing was performed.  Patient was admitted to the hospitalist who graciously accepted for further cares and  work-up.  Patient will need psychiatry consultation as well given that his stay of commitment was revoked.    Diagnosis:    ICD-10-CM    1. Alcoholic intoxication without complication (H)  F10.920 Drug abuse screen 77 urine ([place], [place], [facility])     Asymptomatic COVID-19 Virus (Coronavirus) by PCR     SARS-CoV-2 COVID-19 Virus (Coronavirus) RT-PCR     SARS-CoV-2 COVID-19 Virus (Coronavirus) RT-PCR     Occult blood stool     Troponin I     Troponin I     CANCELED: Troponin I   2. Suicidal ideation  R45.851    3. Hematemesis, presence of nausea not specified  K92.0    4. Renal insufficiency  N28.9        Disposition:  Admitted to Dr. Koenig.    Scribe Disclosure:  I, Damir Kahn, am serving as a scribe at 4:30 PM on 6/10/2020 to document services personally performed by Adriana Deleon MD based on my observations and the provider's statements to me.                    Adriana Deleon MD  06/10/20 5211

## 2020-06-10 NOTE — PHARMACY-ADMISSION MEDICATION HISTORY
Pharmacy Medication History  Admission medication history interview status for the 6/10/2020  admission is complete. See EPIC admission navigator for prior to admission medications     Medication history sources: Patient, Surescripts and Care Everywhere  Medication history source reliability: Good  Adherence assessment: Moderate    Significant changes made to the medication list:  Patient stopped taking Spironolactone as it was bothering his stomach.    Additional medication history information:   Last time patient took any medications was 2-3 days ago.    Medication reconciliation completed by provider prior to medication history? No    Time spent in this activity: 20 minutes      Prior to Admission medications    Medication Sig Last Dose Taking? Auth Provider   fluticasone-vilanterol (BREO ELLIPTA) 200-25 MCG/INH inhaler Inhale 1 puff into the lungs daily Past Week at Unknown time Yes Jeff Brito MD   furosemide (LASIX) 40 MG tablet Take 1 tablet (40 mg) by mouth daily Past Week at Unknown time Yes Jeff Brito MD   mirtazapine (REMERON) 15 MG tablet Take 1 tablet (15 mg) by mouth At Bedtime Past Week at Unknown time Yes Jeff Brito MD   multivitamin w/minerals (THERA-VIT-M) tablet Take 1 tablet by mouth daily Past Week at Unknown time Yes Chivo Murcia MD   pantoprazole (PROTONIX) 40 MG EC tablet Take 1 tablet (40 mg) by mouth every morning (before breakfast) Past Week at Unknown time Yes Jeff Brito MD   QUEtiapine (SEROQUEL) 50 MG tablet Take 1 tablet (50 mg) by mouth nightly as needed (sleep) Past Week at Unknown time Yes Jeff Brito MD   tamsulosin (FLOMAX) 0.4 MG capsule Take 1 capsule (0.4 mg) by mouth every evening Past Week at Unknown time Yes Jeff Brito MD   traZODone (DESYREL) 100 MG tablet Take 1 tablet (100 mg) by mouth At Bedtime Past Week at Unknown time Yes Jeff Brito MD   vortioxetine (TRINTELLIX) 10 MG tablet Take 1 tablet (10 mg) by mouth daily Past Week at  Unknown time Yes Jeff Brito MD   order for DME Equipment being ordered: Walker Wheels () and Walker ()  Treatment Diagnosis: difficulty walking   Donell Lucas MD   spironolactone (ALDACTONE) 50 MG tablet Take 1 tablet (50 mg) by mouth daily  Patient not taking: Reported on 6/10/2020 Not Taking at Unknown time  Jeff Brito MD

## 2020-06-10 NOTE — ED NOTES
Bed: [facility]  Expected date: 6/10/20  Expected time: 1:35 AM  Means of arrival: Ambulance  Comments:  HEMS 414 65M suicidal;intox./hold

## 2020-06-10 NOTE — PROGRESS NOTES
RECEIVING UNIT ED HANDOFF REVIEW    ED Nurse Handoff Report was reviewed by: Rowan Ahuja RN on Umm 10, 2020 at 5:44 PM

## 2020-06-10 NOTE — ED TRIAGE NOTES
Pt reports he is feeling more depressed and is suicidal with no plan. Pt reports he vomited blood x2 today and has been drinking vodka

## 2020-06-10 NOTE — ED NOTES
[facility]  ED Nurse Handoff Report    ED Chief complaint: Alcohol Intoxication and Suicidal      ED Diagnosis:   Final diagnoses:   Alcoholic intoxication without complication (H)   Suicidal ideation   Hematemesis, presence of nausea not specified   Renal insufficiency       Code Status: Full Code    Allergies:   Allergies   Allergen Reactions     Amlodipine Swelling     Lisinopril      Other reaction(s): Angioedema  Mouth and tongue swelling   Mouth and tongue swelling          Patient Story: Alcohol intoxication and suicidal ideation  Focused Assessment:  Patient presents to ED with concern of alcohol intoxication and suicidal ideation, bloody emesis.  Patient was no longer suicidal upon ED arrival. Patient had shortness of breath due to abdominal distention from ascites. US guided paracentesis was done and had 4 L fluid removed today. Patient has been metabolizing alcohol well at this time,     Treatments and/or interventions provided: Banana bag, Pantoprazole IV  Patient's response to treatments and/or interventions: Will monitor    To be done/followed up on inpatient unit:  close monitoring    Does this patient have any cognitive concerns?: na    Activity level - Baseline/Home:  Independent  Activity Level - Current:   Independent    Patient's Preferred language: English   Needed?: No    Isolation: None  Infection: Not Applicable  Bariatric?: No    Vital Signs:   Vitals:    06/10/20 1204 06/10/20 1230 06/10/20 1236 06/10/20 1400   BP: 139/64 (!) 137/121  (!) 163/76   BP Location: Left arm      Pulse: 76 78  66   Resp: 16 18     Temp:       TempSrc:       SpO2: 94% 96% 96% 93%   Weight:       Height:           Cardiac Rhythm:     Was the PSS-3 completed:   Yes  What interventions are required if any?               Family Comments: na  OBS brochure/video discussed/provided to patient/family: N/A              Name of person given brochure if not patient: na              Relationship to  patient: na    For the majority of the shift this patient's behavior was Green.   Behavioral interventions performed were none.    ED NURSE PHONE NUMBER: *[phone]

## 2020-06-10 NOTE — H&P
[facility]  History and Physical   Hospitalist  Jace Koenig MD       Jim Richard MRN# [id number]   YOB: 1955 Age: 65 year old      Date of Admission:  6/10/2020         Assessment and Plan:   Jim Richard is a 65 year old male with PMH significant for alcohol abuse, alcoholic cirrhosis with ascites, JANIS, depression and anxiety, history of G.I. bleed with esophageal varices who presented to ER with alcohol intoxication and suicidal ideation. While in ED he also reported some dark stools and is being admitted for further evaluation of G.I. bleed    #Likely G.I. bleed with history of esophagitis and esophageal varices  #chronic anemia and thrombocytopenia  -will admit as inpatient, currently hemodynamically stable with no suggestion of active bleed  -baseline hemoglobin 8-9; hb 9.6  -will monitor hemoglobin Q8 hour and transfuse PRN for hemoglobin less than seven  -consult G.I., NPO after midnight for probable EGD in a.m.  -Protonix IV BID  -Rocephin IV for SBP prophylaxis    #Alcohol abuse/dependence  #alcoholic cirrhosis with ascites  #medication noncompliance  -has stopped taking all his medications recently  -had ultrasound-guided paracentesis done in ED 5/10; no 4 L removed  -will hold off on PTA Lasix and Aldactone for now given slight worsening renal function  -blood alcohol level on admission 0.32, at risk for withdrawal  -will start Valium per [facility] protocol  -psychiatry and chemical dependency evaluation, apparently his commitment is been removed  for disposition planning  -will start clonidine for tremors    #Anxiety and depression  -again noncompliant with his medication  -was suicidal on presentation, currently denies any suicidal ideations  -will resume PTA Remeron, Seroquel, trazodone, Trintellix  -psychiatry evaluation    #Acute on CKD  -baseline creatinine around 1.3-1.5  -current creatinine 1.7, likely prerenal secondary to alcohol abuse  and poor PO intake   - hold off on PTA Lasix, Aldactone  -will start cautious IV hydration with normal saline at 75 ML per hour and monitor BMP    #BPH: continue Flomax    #COPD  -respiratory status stable continue; continue PTA inhalers    # DVT prophylaxis: mechanical with PCD boots  # Code status: full code           Primary Care Physician:   Talon Elias [phone]         Chief Complaint:   alcohol intoxication, dark stool    History is obtained from the patient         History of Present Illness:     Jim Richard is a 65 year old male with PMH significant for alcohol abuse, alcoholic cirrhosis with ascites, JANIS, depression and anxiety, history of G.I. bleed with esophageal varices who presented to ER with alcohol intoxication and suicidal ideation. He was admitted in March with similar presentation and was evaluated for G.I. bleed. EGD at that time was noted with reflux esophagitis and grade 1 esophageal varices. He was admitted in May to psychiatry. He is under commitment which apparently is being revoked. He reports feeling depressed and started drinking again and has been drinking heavily for past three weeks, yesterday he reported feeling suicidal and thus presented to ER. His blood alcohol level was 0.32. He does report some chronic shortness of breath and while in ED he also reported some dark stools, fecal occult was positive. He had 4 L paracentesis done ER and is being admitted for further evaluation of G.I. bleed    The patient denies any fever, chills, rigors, chest pain.  Denies pain abdomen.  No bladder disturbances.    In ED patient was seen by Dr Garcia and hospitalist was requested admission for further evaluation.               Past Medical History:     #Alcohol dependence with cirrhosis and ascites  #history of G.I. bleed with esophagitis and esophageal varices  #anxiety/depression  #chronic anemia and thrombocytopenia  #CKD stage III  #BPH  #COPD  #JANIS on CPAP          Past  Surgical History:     Past Surgical History:   Procedure Laterality Date     APPENDECTOMY       BYPASS GRAFT FEMOROPOPLITEAL  6/12/2012    Procedure: BYPASS GRAFT FEMOROPOPLITEAL;  LEFT FEMORAL TO ABOVE KNEE POPLITEAL BYPASS, ENDARTERECTOMY OF SFA AND PF ARTERIES, LEFT EXTERNAL ILIAC TO COMMON FEMORAL INTERPOSITION GRAFT;  Surgeon: Damir Roberts MD;  Location:  OR     COLONOSCOPY       COLONOSCOPY N/A 8/8/2019    Procedure: COLONOSCOPY;  Surgeon: Pavan Arguello MD;  Location:  GI     COLONOSCOPY N/A 3/10/2020    Procedure: COLONOSCOPY;  Surgeon: Rosendo Shaw MD;  Location:  GI     ENDARTERECTOMY FEMORAL  6/12/2012    Procedure: ENDARTERECTOMY FEMORAL;;  Surgeon: Damir Roberts MD;  Location:  OR     ESOPHAGOSCOPY, GASTROSCOPY, DUODENOSCOPY (EGD), COMBINED N/A 3/22/2018    Procedure: COMBINED ESOPHAGOSCOPY, GASTROSCOPY, DUODENOSCOPY (EGD);  ESOPHAGOSCOPY, GASTROSCOPY, DUODENOSOCPY.;  Surgeon: Valeri Pruitt MD;  Location:  OR     ESOPHAGOSCOPY, GASTROSCOPY, DUODENOSCOPY (EGD), COMBINED N/A 9/29/2018    Procedure: COMBINED ESOPHAGOSCOPY, GASTROSCOPY, DUODENOSCOPY (EGD);;  Surgeon: Rosendo Shaw MD;  Location: [facility]     ESOPHAGOSCOPY, GASTROSCOPY, DUODENOSCOPY (EGD), COMBINED N/A 8/2/2019    Procedure: ESOPHAGOGASTRODUODENOSCOPY (EGD);  Surgeon: Pavan Arguello MD;  Location:  GI     ESOPHAGOSCOPY, GASTROSCOPY, DUODENOSCOPY (EGD), COMBINED N/A 9/25/2019    Procedure: ESOPHAGOGASTRODUODENOSCOPY (EGD);  Surgeon: Pavan Arguello MD;  Location:  GI     ESOPHAGOSCOPY, GASTROSCOPY, DUODENOSCOPY (EGD), COMBINED N/A 3/8/2020    Procedure: ESOPHAGOGASTRODUODENOSCOPY (EGD);  Surgeon: Rosendo Shaw MD;  Location:  GI     HERNIA REPAIR  2017    Abdominal     LAMINECTOMY, FUSION LUMBAR THREE+ LEVEL, COMBINED N/A 9/22/2014    Procedure: COMBINED LAMINECTOMY, FUSION LUMBAR THREE+ LEVEL;  Surgeon: Sebastien Pruitt MD;  Location: [facility]     TONSILLECTOMY & ADENOIDECTOMY                 Home Medications:     Prior to Admission Medications   Prescriptions Last Dose Informant Patient Reported? Taking?   QUEtiapine (SEROQUEL) 50 MG tablet Past Week at Unknown time Self No Yes   Sig: Take 1 tablet (50 mg) by mouth nightly as needed (sleep)   fluticasone-vilanterol (BREO ELLIPTA) 200-25 MCG/INH inhaler Past Week at Unknown time Self No Yes   Sig: Inhale 1 puff into the lungs daily   furosemide (LASIX) 40 MG tablet Past Week at Unknown time Self No Yes   Sig: Take 1 tablet (40 mg) by mouth daily   mirtazapine (REMERON) 15 MG tablet Past Week at Unknown time Self No Yes   Sig: Take 1 tablet (15 mg) by mouth At Bedtime   multivitamin w/minerals (THERA-VIT-M) tablet Past Week at Unknown time Self No Yes   Sig: Take 1 tablet by mouth daily   order for DME  Self No No   Sig: Equipment being ordered: Walker Wheels () and Walker ()  Treatment Diagnosis: difficulty walking   pantoprazole (PROTONIX) 40 MG EC tablet Past Week at Unknown time Self No Yes   Sig: Take 1 tablet (40 mg) by mouth every morning (before breakfast)   spironolactone (ALDACTONE) 50 MG tablet Not Taking at Unknown time Self No No   Sig: Take 1 tablet (50 mg) by mouth daily   Patient not taking: Reported on 6/10/2020   tamsulosin (FLOMAX) 0.4 MG capsule Past Week at Unknown time Self No Yes   Sig: Take 1 capsule (0.4 mg) by mouth every evening   traZODone (DESYREL) 100 MG tablet Past Week at Unknown time Self No Yes   Sig: Take 1 tablet (100 mg) by mouth At Bedtime   vortioxetine (TRINTELLIX) 10 MG tablet Past Week at Unknown time Self No Yes   Sig: Take 1 tablet (10 mg) by mouth daily      Facility-Administered Medications: None            Allergies:     Allergies   Allergen Reactions     Amlodipine Swelling     Lisinopril      Other reaction(s): Angioedema  Mouth and tongue swelling   Mouth and tongue swelling               Social History:   Jim Richard  reports that he has been smoking cigarettes. He has been  "smoking about 1.00 pack per day. He has never used smokeless tobacco. He reports current alcohol use. He reports that he does not use drugs.              Family History:   Jim Richard family history includes Coronary Artery Disease Early Onset in his mother; Lung Cancer in his father; Mental Illness in his son; Substance Abuse in his brother and father.    Family history was reviewed by myself and not pertinent to current presentation.           Review of Systems:   A10 point Review of Systems was done and were negative other than noted in the HPI.             Physical Exam:   Blood pressure (!) 163/76, pulse 66, temperature 97.9  F (36.6  C), temperature source Temporal, resp. rate 18, height 1.702 m (5' 7\"), weight 86.2 kg (190 lb), SpO2 93 %.  190 lbs 0 oz        Constitutional: Alert, awake and orienetd X 3; lying comfortably in bed in no apparent distress   HEENT: Pupils equal and reactive to light and accomodation, EOMI intact; neck supple no raised JVD or rigidity    Oral cavity: Dry oral mucosa   Cardiovascular: Normal s1 s2, regular rate and rhythm, no murmur   Lungs: B/l clear to auscultation, no wheezes or crepitations   Abdomen: Soft, distended, ascites +, nd, no guarding, rigidity or rebound; BS +   LE : Mild b/l edema +   Musculoskeletal: Power 5/5 in all extremities; grossly tremulous    Neuro: No focal neurological deficits noted, CN II to XII grossly intact   Psychiatry: normal mood and affect  Skin: No obvious skin rashes or ulcers             Data:   All new lab and imaging data was reviewed in Epic.   Significant labs and imagings include:    CMP with BUN 34, creatinine 1.72  LFTs mostly unremarkable, AST 89, bilirubin 1  FOB T positive  CBC with WC 5.9, hemoglobin 9.6  COVID negative  ETOH 0.32  urine toxicology unremarkable  chest x-ray unremarkable, noted with old rib fractures             Jace Koenig MD  Hospitalist  "

## 2020-06-10 NOTE — ED NOTES
DATE:  6/10/2020   TIME OF RECEIPT FROM LAB:  0246  LAB TEST:  etoh  LAB VALUE:  0.32  RESULTS GIVEN WITH READ-BACK TO (PROVIDER):  Dr. Sequeira  TIME LAB VALUE REPORTED TO PROVIDER:   0246

## 2020-06-10 NOTE — ED PROVIDER NOTES
History     Chief Complaint:  Alcohol Intoxication & Suicidal    HPI   Jim Richard is a 65 year old male who presents with concern of alcohol intoxication and suicidal ideation.  The patient reports he drinks vodka on a regular basis.  He has never had withdrawal seizures.  He does have some withdrawal tremors, but no hallucinations.  The patient reports that he has been thinking about harming himself by ingestion of pills.  The patient denies having taken any pills to harm himself.  The patient reports he had 2 episodes of emesis and there was very scant amount of blood within it.  The patient reports chronic liver disease and chronic abdominal distention, but nothing acute.  Of note, the patient reports he was diagnosed with pneumonia recently and took most of his antibiotics.  He reports the cough which he had with that is now improved.    Allergies:  Amlodipine: swelling   Lisinopril: angioedema      Medications:    Breo Ellipta inhaler  Lasix  Remeron  Thera-vit-m   Protonix  Seroquel  Aldactone  Flomax  Trazodone  Trintellix     Past Medical History:    Alcoholism  Anxiety  CAD  Depression  Esophageal varices w/ bleeding   Myocardial infarction   Hepatomegaly    Hyperlipidemia   Hypertension  Obstructive sleep apnea, on CPAP  Peripheral vascular disease  Subdural hematoma   Alcoholic hepatitis   COPD  Chronic back pain   Alcoholic liver cirrhosis w/ ascites  Anemia    Lumbar spinal stenosis w/ neurogenic claudication   Peripheral artery disease     Past Surgical History:    Appendectomy   Femoral endarterectomy   Left femoropopliteal bypass graft  Abdominal herniorrhaphy  Lumbar laminectomy w/ fusion, 3 level   T&A    Family History:    Mental illness: son  CAD: mother  Substance abuse, lung cancer: father  Substance abuse: brother     Social History:  Smoking status: Current every day smoker, 1.00ppd  Substance use: No  Alcohol use: Yes  Marital Status:   [4]     Review of Systems  "  Constitutional: Negative for fever.   HENT: Negative for sore throat.    Respiratory: Negative for cough and shortness of breath.    Cardiovascular: Negative for chest pain.   Gastrointestinal: Positive for vomiting. Negative for abdominal pain and diarrhea.   Skin: Negative for rash.   Neurological: Negative for seizures.   Psychiatric/Behavioral: Positive for suicidal ideas.     Physical Exam     Patient Vitals for the past 24 hrs:   BP Temp Temp src Pulse Heart Rate Resp SpO2 Height Weight   06/10/20 0215 (!) 146/82 -- -- 81 -- -- 92 % -- --   06/10/20 0158 136/56 -- -- -- 72 16 95 % 1.702 m (5' 7\") 86.2 kg (190 lb)   06/10/20 0149 130/50 -- -- -- 81 -- 96 % -- --   06/10/20 0148 -- 97.9  F (36.6  C) Temporal -- -- -- -- -- --     Physical Exam  Physical Exam   General:  Sitting on bed by self.   HENT:  No obvious trauma to head  Right Ear:  External ear normal.   Left Ear:  External ear normal.   Nose:  Nose normal.   Eyes:  Conjunctivae and EOM are normal. Pupils are equal, round, and reactive.   Neck: Normal range of motion. Neck supple. No tracheal deviation present.   CV:  Normal heart sounds. No murmur heard.  Pulm/Chest: Effort normal and breath sounds normal.   Abd: Soft. Some distension is present. There is no tenderness. There is no rigidity, no rebound and no guarding.   M/S: Normal range of motion.   Neuro: Alert. GCS 15.  Skin: Skin is warm and dry. No rash noted. Not diaphoretic.   Psych: Normal mood and affect. Behavior is normal.     Emergency Department Course     Laboratory:  Laboratory findings were communicated with the patient who voiced understanding of the findings.    Alcohol ethyl: 0.32(HH)    INR: 1.22(H)    CBC: HGB 9.6(L), o/w WNL (WBC 5.9, )  CMP: BUN 34(H), GFR estimate 40(L), Calcium 8.0(L), Albumin 2.7(L), Alkphos 239(H), AST 89(H), Creatinine 1.72(H), o/w WNL     Interventions:  0212: Hydroxyzine 50mg PO  0335: Dextrose 5%, 0.45% NaCl 1L, Infuvite 10mL, Thiamine 100mg, " Folic acid 1mg, Infusion IV    Emergency Department Course:  Patient arrived by EMS.     Past medical records, nursing notes, and vitals reviewed.    0201: I performed an exam of the patient as documented above.     0206: IV was inserted and blood was drawn for laboratory testing, results above.    I have performed an in person assessment of the patient. Based on this assessment the patient no longer requires a one on one attendant at this point in time.    uRfino Sequeira, DO  2:55 AM  Umm 10, 2020    The patient was signed out to Dr. Macario, [facility] ED physician, pending DEC evaluation after becoming sober.     Impression & Plan      Medical Decision Making:  Jim Richard is a very pleasant 65 year old year old patient who presents to the emergency department with concern of alcohol intoxication.  The patient was found to be intoxicated with an alcohol of 0.32.  Patient has had thoughts of wanting to harm himself by overdose.  The patient denies having taken anything to harm himself.  The patient reports he had 2 episodes of emesis.  He reports that there is scant amount of blood in it.  He has had no emesis here.  His hemoglobin is stable for him and actually better compared to prior.  He is hemodynamically stable.  I suspect that the hematemesis was a small Jessica-Martinez tear.  The patient will be monitored in the emergency department until which time he is sober and can undergo a full mental health DEC evaluation.  If he has additional episodes of hematemesis or becomes hemodynamically unstable, a medical admission for EGD may be necessary, but I do not suspect that that will occur based upon his description.  I reviewed this with him and he agreed.  The patient is currently boarding.  Care of the patient will be signed out to the morning physician, Dr. Macario.    Diagnosis:    ICD-10-CM    1. Alcoholic intoxication without complication (H)  F10.920    2. Suicidal ideation  R45.851    3.  Hematemesis, presence of nausea not specified  K92.0      Disposition: The patient was signed out to Dr. Macario, oncoming ED physician, pending DEC evaluation after becoming sober    Hattie Darnell  6/10/2020    EMERGENCY DEPARTMENT    Scribe Disclosure:  I, Hattie Darnell, am serving as a scribe at 2:01 AM on 6/10/2020 to document services personally performed by Rufino Sequeira DO based on my observations and the provider's statements to me.        Rufino Sequeira DO  06/10/20 0602

## 2020-06-10 NOTE — TELEPHONE ENCOUNTER
S:  Pt is a 65 yr old male seen in the [facility] ED due to SI and alcohol consumption.    B:  Info per MELANIE Clifton  and chart :  Pt has a hx of depression, anxiety and an alcohol use d/o.  He reports he is suicidal with a plan to overdose.  He reports a previous attempt.  He called 911.  He says his depression and anxiety are very high and he has been drinking quite a bit for about 3 weeks.  he did not say how much.  He reports he has vomited a bit of blood. He states he hasn't been eating.  Pt was recently on 77, discharged 5/4.  He has been on a Stay of Commitment which is now being revoked.  It is a Straight CD commitment..he is also on a [place].       Pt has numerous health issues including Liver disease and Ascites.  Pt was sent down for paracentesis and 4 L of fluid was removed.  He reports he recently had pneumonia and has shortness of breath.  A covid test is in process.  Utox was ordered.  Numerous labs are off a bit.         R:  Pt will be sent to medical for further stabilization and to await the COVID results.

## 2020-06-10 NOTE — ED PROVIDER NOTES
Jim Richard is a 65 year old male who was signed out to me with alcohol intoxication and past suicidal statement.    Patient was evaluated by her mental health  who spoke with his  and noted that they had an agreement that if he started drinking again that his stay of commitment would be revoked.  She is currently planning on revoking his stay of commitment, so we will be admitting him to the hospital.  He does have a fair amount of abdominal distention and reports that he is having trouble eating secondary to the distention, so we will have a therapeutic paracentesis performed prior to admission to [facility].  Asymptomatic COVID testing was obtained.      Upon recheck, patient reports that he has been feeling short of breath for the past 2 weeks since his last hospital visit and at that time he was diagnosed with a possible pneumonia and was prescribed antibiotics.  He did not complete his course of antibiotics.  His symptoms have not gotten worse but have not improved.  He is not had any fevers.  He has had a mild cough that is been nonproductive.    He also notes that today this morning since being here in the emergency department he has developed diarrhea that is black.  Denies any abdominal pain.    Plan for portable chest x-ray, EKG, troponin, and Hemoccult.  Patient well require medical admission given shortness of breath and diarrhea with ongoing pandemic of COVID-19 until his testing can result and he can be medically cleared from his other symptoms prior to admission to [facility].       Sami Macario MD  06/10/20 1038       Sami Macario MD  06/10/20 1536

## 2020-06-11 LAB
ANION GAP SERPL CALCULATED.3IONS-SCNC: 6 MMOL/L (ref 3–14)
BUN SERPL-MCNC: 31 MG/DL (ref 7–30)
CALCIUM SERPL-MCNC: 7.6 MG/DL (ref 8.5–10.1)
CHLORIDE SERPL-SCNC: 104 MMOL/L (ref 94–109)
CO2 SERPL-SCNC: 26 MMOL/L (ref 20–32)
CREAT SERPL-MCNC: 1.75 MG/DL (ref 0.66–1.25)
GFR SERPL CREATININE-BSD FRML MDRD: 40 ML/MIN/{1.73_M2}
GLUCOSE SERPL-MCNC: 98 MG/DL (ref 70–99)
HGB BLD-MCNC: 8.3 G/DL (ref 13.3–17.7)
HGB BLD-MCNC: 8.4 G/DL (ref 13.3–17.7)
HGB BLD-MCNC: 8.5 G/DL (ref 13.3–17.7)
INTERPRETATION ECG - MUSE: NORMAL
POTASSIUM SERPL-SCNC: 4 MMOL/L (ref 3.4–5.3)
SODIUM SERPL-SCNC: 136 MMOL/L (ref 133–144)
UPPER GI ENDOSCOPY: NORMAL

## 2020-06-11 PROCEDURE — 25800030 ZZH RX IP 258 OP 636: Performed by: HOSPITALIST

## 2020-06-11 PROCEDURE — 85018 HEMOGLOBIN: CPT | Performed by: HOSPITALIST

## 2020-06-11 PROCEDURE — C9113 INJ PANTOPRAZOLE SODIUM, VIA: HCPCS | Performed by: HOSPITALIST

## 2020-06-11 PROCEDURE — 0DJ08ZZ INSPECTION OF UPPER INTESTINAL TRACT, VIA NATURAL OR ARTIFICIAL OPENING ENDOSCOPIC: ICD-10-PCS | Performed by: INTERNAL MEDICINE

## 2020-06-11 PROCEDURE — 36415 COLL VENOUS BLD VENIPUNCTURE: CPT | Performed by: HOSPITALIST

## 2020-06-11 PROCEDURE — 25000128 H RX IP 250 OP 636: Performed by: HOSPITALIST

## 2020-06-11 PROCEDURE — 99222 1ST HOSP IP/OBS MODERATE 55: CPT | Performed by: PSYCHIATRY & NEUROLOGY

## 2020-06-11 PROCEDURE — 99232 SBSQ HOSP IP/OBS MODERATE 35: CPT | Performed by: HOSPITALIST

## 2020-06-11 PROCEDURE — 25000125 ZZHC RX 250: Performed by: INTERNAL MEDICINE

## 2020-06-11 PROCEDURE — 40000007 ZZH STATISTIC ADULT CD FACE TO FACE-NO CHRG

## 2020-06-11 PROCEDURE — G0500 MOD SEDAT ENDO SERVICE >5YRS: HCPCS | Performed by: INTERNAL MEDICINE

## 2020-06-11 PROCEDURE — 43235 EGD DIAGNOSTIC BRUSH WASH: CPT | Performed by: INTERNAL MEDICINE

## 2020-06-11 PROCEDURE — 80048 BASIC METABOLIC PNL TOTAL CA: CPT | Performed by: HOSPITALIST

## 2020-06-11 PROCEDURE — 12000000 ZZH R&B MED SURG/OB

## 2020-06-11 PROCEDURE — 25000128 H RX IP 250 OP 636: Performed by: INTERNAL MEDICINE

## 2020-06-11 PROCEDURE — 25000132 ZZH RX MED GY IP 250 OP 250 PS 637: Mod: GY | Performed by: HOSPITALIST

## 2020-06-11 RX ORDER — NALOXONE HYDROCHLORIDE 0.4 MG/ML
.1-.4 INJECTION, SOLUTION INTRAMUSCULAR; INTRAVENOUS; SUBCUTANEOUS
Status: DISCONTINUED | OUTPATIENT
Start: 2020-06-11 | End: 2020-06-12

## 2020-06-11 RX ORDER — FENTANYL CITRATE 50 UG/ML
INJECTION, SOLUTION INTRAMUSCULAR; INTRAVENOUS PRN
Status: DISCONTINUED | OUTPATIENT
Start: 2020-06-11 | End: 2020-06-11 | Stop reason: HOSPADM

## 2020-06-11 RX ORDER — MIRTAZAPINE 7.5 MG/1
7.5 TABLET, FILM COATED ORAL AT BEDTIME
Status: DISCONTINUED | OUTPATIENT
Start: 2020-06-11 | End: 2020-06-11

## 2020-06-11 RX ORDER — FLUMAZENIL 0.1 MG/ML
0.2 INJECTION, SOLUTION INTRAVENOUS
Status: ACTIVE | OUTPATIENT
Start: 2020-06-11 | End: 2020-06-12

## 2020-06-11 RX ADMIN — CLONIDINE HYDROCHLORIDE 0.1 MG: 0.1 TABLET ORAL at 21:03

## 2020-06-11 RX ADMIN — PANTOPRAZOLE SODIUM 40 MG: 40 INJECTION, POWDER, FOR SOLUTION INTRAVENOUS at 17:47

## 2020-06-11 RX ADMIN — TRAZODONE HYDROCHLORIDE 100 MG: 50 TABLET ORAL at 21:13

## 2020-06-11 RX ADMIN — VORTIOXETINE 10 MG: 10 TABLET, FILM COATED ORAL at 08:26

## 2020-06-11 RX ADMIN — PANTOPRAZOLE SODIUM 40 MG: 40 INJECTION, POWDER, FOR SOLUTION INTRAVENOUS at 06:43

## 2020-06-11 RX ADMIN — DIAZEPAM 10 MG: 5 TABLET ORAL at 23:53

## 2020-06-11 RX ADMIN — MIRTAZAPINE 15 MG: 15 TABLET, FILM COATED ORAL at 21:13

## 2020-06-11 RX ADMIN — QUETIAPINE FUMARATE 50 MG: 50 TABLET ORAL at 23:48

## 2020-06-11 RX ADMIN — CLONIDINE HYDROCHLORIDE 0.1 MG: 0.1 TABLET ORAL at 08:26

## 2020-06-11 RX ADMIN — QUETIAPINE FUMARATE 50 MG: 50 TABLET ORAL at 00:17

## 2020-06-11 RX ADMIN — FLUTICASONE FUROATE AND VILANTEROL TRIFENATATE 1 PUFF: 200; 25 POWDER RESPIRATORY (INHALATION) at 08:38

## 2020-06-11 RX ADMIN — SODIUM CHLORIDE: 9 INJECTION, SOLUTION INTRAVENOUS at 06:43

## 2020-06-11 RX ADMIN — FOLIC ACID 1 MG: 1 TABLET ORAL at 08:26

## 2020-06-11 RX ADMIN — Medication 100 MG: at 08:26

## 2020-06-11 RX ADMIN — TAMSULOSIN HYDROCHLORIDE 0.4 MG: 0.4 CAPSULE ORAL at 21:03

## 2020-06-11 RX ADMIN — MULTIPLE VITAMINS W/ MINERALS TAB 1 TABLET: TAB at 08:26

## 2020-06-11 RX ADMIN — QUETIAPINE 25 MG: 25 TABLET ORAL at 13:43

## 2020-06-11 RX ADMIN — CEFTRIAXONE SODIUM 1 G: 1 INJECTION, POWDER, FOR SOLUTION INTRAMUSCULAR; INTRAVENOUS at 17:52

## 2020-06-11 ASSESSMENT — ACTIVITIES OF DAILY LIVING (ADL)
ADLS_ACUITY_SCORE: 13

## 2020-06-11 NOTE — PROGRESS NOTES
Admission    Patient arrives to room 603-2 via cart from ED.  Care plan note:   DATE & TIME: 6/10/2020 5097-9257    Cognitive Concerns/ Orientation : A&O x4  BEHAVIOR & AGGRESSION TOOL COLOR: Green  CIWA SCORE: 10 & 9 (tremors, anxiety, nausea), 10 mg valium given x2 this shift  ABNL VS/O2: HTN at times, scheduled clonidine  MOBILITY: SBA, gaitbelt, walker  PAIN MANAGMENT: denies  DIET: regular. NPO at midnight for EGD tomorrow  BOWEL/BLADDER: continent. x2 small, loose BMs this shift, no obvious signs of blood in stool.  ABNL LAB/BG: Hgb 9.0. Creat 1.72. AST 89. Alkaline phosphatase 239. COVID negative.  DRAIN/DEVICES: R PIV infusing  TELEMETRY RHYTHM: NA  SKIN: WDL  TESTS/PROCEDURES: EGD tomorrow  D/C DAY/GOALS/PLACE: pending progress  OTHER IMPORTANT INFO: GI, psych, chem dep, and SW following  MD/RN ROUNDING SIGNED OFF D/E SHIFT: Yes  COMMIT TO SIT DONE AND SIGNED OFF: Yes    Inpatient nursing criteria listed below were met:    PCD's Documented: Yes  Skin issues/needs documented :Yes  Isolation education started/completed Yes  Patient allergies verified with patient: Yes  Verified completion of [place] Risk Assessment Tool:  No  Verified completion of Guardianship screening tool: No  Fall Prevention: Care plan updated, Education given and documented Yes  Care Plan initiated: Yes  Home medications documented in belongings flowsheet: NA  Patient belongings documented in belongings flowsheet: Yes  Reminder note (belongings/ medications) placed in discharge instructions:Yes  Admission profile/ required documentation complete: No  Bedside Report Letter given and explained to patient NA

## 2020-06-11 NOTE — PLAN OF CARE
DATE & TIME: 6/11/2020 2458-1431   Cognitive Concerns/ Orientation : A&O x4  BEHAVIOR & AGGRESSION TOOL COLOR: Green  CIWA SCORE: 1/0 (mild tremors)  ABNL VS/O2: VSS, on RA. HTN at times, scheduled clonidine.  MOBILITY: SBA, gaitbelt, walker  PAIN MANAGMENT: Denies  DIET: Now regular diet.  BOWEL/BLADDER: 1 BM this shift, no evidence of blood.  ABNL LAB/BG: Hgb 8.3. Creat 1.75.   DRAIN/DEVICES: R PIV infusing NS @75  TELEMETRY RHYTHM: NA  SKIN: WDL  TESTS/PROCEDURES: EGD completed today. Unremarkable.  D/C DAY/GOALS/PLACE: Discharge date/time/disposition unknown- per CD note says stay of commitment was revoked   OTHER IMPORTANT INFO: 25mg Seroquel x1 given for anxiety, with relief. GI, psych, chem dep, and SW following. Nursing will continue to monitor.  MD/RN ROUNDING SIGNED OFF D/E SHIFT: Yes  COMMIT TO SIT DONE AND SIGNED OFF: Yes

## 2020-06-11 NOTE — PLAN OF CARE
DATE & TIME: 2300-0730   Cognitive Concerns/ Orientation : A&O x4  BEHAVIOR & AGGRESSION TOOL COLOR: Green  CIWA SCORE: 3 (tremors, anxiety)  ABNL VS/O2: HTN at times, scheduled clonidine  MOBILITY: SBA, gaitbelt, walker  PAIN MANAGMENT: denies  DIET: NPO for EGD  BOWEL/BLADDER: continent. x2 small, loose BMs last shift, no obvious signs of blood in stool reported  ABNL LAB/BG: Hgb 9.0. Creat 1.72. AST 89. Alkaline phosphatase 239.   DRAIN/DEVICES: R PIV infusing banana bag then NS at 75  TELEMETRY RHYTHM: NA  SKIN: WDL  TESTS/PROCEDURES: Probably EGD today with GI consult  D/C DAY/GOALS/PLACE: pending progress - per CD note says stay of commitment was revoked   OTHER IMPORTANT INFO: GI, psych, chem dep, and SW following

## 2020-06-11 NOTE — CONSULTS
[facility]   Initial Psychiatric Consult Note      Requesting Provider:  Jace Koenig MD  Reason for consult: Alcohol abuse; suicidal on admission     Initial History     The patient's care was discussed, patient seen and chart notes were reviewed.    Patient examined for psychiatric consultation.     IDENTIFICATION    Jim Richard is a 65 year old man who reports he is  and has 2 adult children.  He [place] [facility] [place].  He has been unemployed for many years.  Patient presented to emergency department at [facility] on 6/10/2020 on account of alcohol intoxication and suicidal ideations. Patient sees Talon Elias for primary care.  Psychiatry was consulted to render an opinion on his alcohol use disorder and suicidal ideations.  Information was gathered through direct patient contact as well as chart review.    HISTORY OF PRESENT ILLNESS    Jim Richard has an established history of major depressive disorder and alcohol use disorder.  He was discharged on a stay of commitment from station 77 here at [facility] on 5/4/2020.  At the time of discharge, his CD treatment facility would not take him back as he had almost completed the program.  At the point he had captured almost 2 months of sobriety.  [place] [facility] and was supposed to participate in outpatient AA meetings.  Unfortunately, this could only occurr virtually on account of the COVID-19 pandemic but the patient did not have a computer at home and so this never happened.  Within days of his discharge from the hospital, the patient had relapsed on alcohol and presented to the ED as being suicidal.  Patient reports that he was trying to get back in touch with his family members whom he had not seen or been in contact with for over 3 months.  He tells me that he was feeling depressed and this escalated his use of  alcohol.  Patient reports that he was drinking 0.75 L of vodka on a regular basis on account of which he was not compliant with his antidepressant medication regimen which he claims had been beneficial when he was compliant.    With respect to current neurovegetative symptoms of depression, the patient admits to depressed mood, anhedonia, loneliness, poor sleep and lack of motivation.  He admits that he experiences self-harm thoughts especially when he is intoxicated with alcohol.  He does not give history suggestive of bear or psychosis.  He denies experiencing panic attacks, obsessions, compulsions or symptoms suggestive of PTSD.  He denies other illicit drug use.  He admits that when he does take his antidepressant medications he is stable and free of self-harm thoughts or plans.    CHEMICAL DEPENDENCY HISTORY    The patient has extensive history of alcohol use. Pt endorses symptoms related to excessive alcohol use, including over drinking, unsuccessful attempts to curb use, craving its use, having it interfere with work and personal relationships, and continued use despite known bad consequences. Pt also has increased tolerance.  Pt has experienced withdrawal symptoms including tremors, insomnia,  and increased anxiety.  He has had medical complications of his alcohol use including liver cirrhosis, ascites, esophageal varices in addition to generalized deconditioning.  He has been to detox and treatment in the past most recently at [facility]      PAST PSYCHIATRIC HISTORY    Patient was admitted to psychiatry for the first time from 5/1/2020 to 5/4/2020.  Patient denies previous suicide attempts.  Recently switched from Cymbalta to Trintellix while at [facility].    FAMILY HISTORY    Patient denies family history of mental illness or chemical use problems.      SOCIAL HISTORY  Social History     Socioeconomic History     Marital status:      Spouse name:  Not on file     Number of children: 2     Years of education: Not on file     Highest education level: Not on file   Occupational History     Occupation: Printer, on disability   Social Needs     Financial resource strain: Not on file     Food insecurity     Worry: Not on file     Inability: Not on file     Transportation needs     Medical: Not on file     Non-medical: Not on file   Tobacco Use     Smoking status: Current Every Day Smoker     Packs/day: 1.00     Types: Cigarettes     Smokeless tobacco: Never Used     Tobacco comment: Chantix caused nightmares   Substance and Sexual Activity     Alcohol use: Yes     Comment: Last drank Yesterday drinks 750 ml of vodka daily     Drug use: No     Sexual activity: Not Currently   Lifestyle     Physical activity     Days per week: Not on file     Minutes per session: Not on file     Stress: Not on file   Relationships     Social connections     Talks on phone: Not on file     Gets together: Not on file     Attends Yarsanism service: Not on file     Active member of club or organization: Not on file     Attends meetings of clubs or organizations: Not on file     Relationship status: Not on file     Intimate partner violence     Fear of current or ex partner: Not on file     Emotionally abused: Not on file     Physically abused: Not on file     Forced sexual activity: Not on file   Other Topics Concern     Parent/sibling w/ CABG, MI or angioplasty before 65F 55M? 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  Has a living will and is DNR/DNI.  Wants his brother Jame contacted.  (last updated 9/29/2018)           Medications     Medications Prior to Admission   Medication Sig Dispense Refill Last Dose     fluticasone-vilanterol (BREO ELLIPTA) 200-25 MCG/INH inhaler Inhale 1 puff into the lungs daily 28 each 0 Past Week at Unknown time     furosemide (LASIX) 40 MG tablet Take 1 tablet (40 mg) by mouth daily 30  tablet 0 Past Week at Unknown time     mirtazapine (REMERON) 15 MG tablet Take 1 tablet (15 mg) by mouth At Bedtime 30 tablet 0 Past Week at Unknown time     multivitamin w/minerals (THERA-VIT-M) tablet Take 1 tablet by mouth daily 30 each 0 Past Week at Unknown time     pantoprazole (PROTONIX) 40 MG EC tablet Take 1 tablet (40 mg) by mouth every morning (before breakfast) 30 tablet 0 Past Week at Unknown time     QUEtiapine (SEROQUEL) 50 MG tablet Take 1 tablet (50 mg) by mouth nightly as needed (sleep) 30 tablet 0 Past Week at Unknown time     tamsulosin (FLOMAX) 0.4 MG capsule Take 1 capsule (0.4 mg) by mouth every evening 30 capsule 0 Past Week at Unknown time     traZODone (DESYREL) 100 MG tablet Take 1 tablet (100 mg) by mouth At Bedtime 30 tablet 0 Past Week at Unknown time     vortioxetine (TRINTELLIX) 10 MG tablet Take 1 tablet (10 mg) by mouth daily 30 tablet 0 Past Week at Unknown time     order for DME Equipment being ordered: Walker Wheels () and Walker ()  Treatment Diagnosis: difficulty walking 1 each 0      spironolactone (ALDACTONE) 50 MG tablet Take 1 tablet (50 mg) by mouth daily (Patient not taking: Reported on 6/10/2020) 30 tablet 0 Not Taking at Unknown time       Scheduled Medications    cefTRIAXone  1 g Intravenous Q24H     cloNIDine  0.1 mg Oral BID     fluticasone-vilanterol  1 puff Inhalation Daily     folic acid  1 mg Oral Daily     mirtazapine  15 mg Oral At Bedtime     multivitamin w/minerals  1 tablet Oral Daily     pantoprazole (PROTONIX) IV  40 mg Intravenous Q12H     sodium chloride (PF)  3 mL Intracatheter Q8H     tamsulosin  0.4 mg Oral QPM     traZODone  100 mg Oral At Bedtime     vitamin B1  100 mg Oral Daily     vortioxetine  10 mg Oral Daily     PRNs:  diazepam **OR** diazepam, flumazenil, lidocaine 4%, lidocaine (buffered or not buffered), - MEDICATION INSTRUCTIONS -, melatonin, naloxone, naloxone, ondansetron **OR** ondansetron, prochlorperazine **OR**  prochlorperazine **OR** prochlorperazine, QUEtiapine, QUEtiapine, sodium chloride (PF), sodium chloride (PF)      Allergies        Allergies   Allergen Reactions     Amlodipine Swelling     Lisinopril      Other reaction(s): Angioedema  Mouth and tongue swelling   Mouth and tongue swelling           Previous Medical History     Past Medical History:   Diagnosis Date     Alcoholism (H) 1/14/2013    had treatment at [facility]     Anxiety      Coronary artery disease 09/09/2014    Nuclear stress test with slight fixed defect inferiorly, no ischemia     Depression     w/anxiety     Esophageal varices with bleeding 04/28/2018    6 varices banded on EGD     Heart attack (H)      Hepatomegaly     Fatty liver, chronic alcoholic     Hyperlipemia      Hypertension      JANIS on CPAP      Peripheral vascular disease (H)      PVD (peripheral vascular disease) (H)      SDH (subdural hematoma) (H) 04/26/2018    Right side, resolved spontaneously     Spinal stenosis         Past Surgical History:   Procedure Laterality Date     APPENDECTOMY       BYPASS GRAFT FEMOROPOPLITEAL  6/12/2012    Procedure: BYPASS GRAFT FEMOROPOPLITEAL;  LEFT FEMORAL TO ABOVE KNEE POPLITEAL BYPASS, ENDARTERECTOMY OF SFA AND PF ARTERIES, LEFT EXTERNAL ILIAC TO COMMON FEMORAL INTERPOSITION GRAFT;  Surgeon: Damir Roberts MD;  Location:  OR     COLONOSCOPY       COLONOSCOPY N/A 8/8/2019    Procedure: COLONOSCOPY;  Surgeon: Pavan Arguello MD;  Location:  GI     COLONOSCOPY N/A 3/10/2020    Procedure: COLONOSCOPY;  Surgeon: Rosendo Shaw MD;  Location:  GI     ENDARTERECTOMY FEMORAL  6/12/2012    Procedure: ENDARTERECTOMY FEMORAL;;  Surgeon: Damir Roberts MD;  Location:  OR     ESOPHAGOSCOPY, GASTROSCOPY, DUODENOSCOPY (EGD), COMBINED N/A 3/22/2018    Procedure: COMBINED ESOPHAGOSCOPY, GASTROSCOPY, DUODENOSCOPY (EGD);  ESOPHAGOSCOPY, GASTROSCOPY, DUODENOSOCPY.;  Surgeon: Valeri Pruitt MD;  Location:  OR     ESOPHAGOSCOPY,  "GASTROSCOPY, DUODENOSCOPY (EGD), COMBINED N/A 9/29/2018    Procedure: COMBINED ESOPHAGOSCOPY, GASTROSCOPY, DUODENOSCOPY (EGD);;  Surgeon: Rosendo Shaw MD;  Location:  GI     ESOPHAGOSCOPY, GASTROSCOPY, DUODENOSCOPY (EGD), COMBINED N/A 8/2/2019    Procedure: ESOPHAGOGASTRODUODENOSCOPY (EGD);  Surgeon: Pavan Arguello MD;  Location:  GI     ESOPHAGOSCOPY, GASTROSCOPY, DUODENOSCOPY (EGD), COMBINED N/A 9/25/2019    Procedure: ESOPHAGOGASTRODUODENOSCOPY (EGD);  Surgeon: Pavan Arguello MD;  Location:  GI     ESOPHAGOSCOPY, GASTROSCOPY, DUODENOSCOPY (EGD), COMBINED N/A 3/8/2020    Procedure: ESOPHAGOGASTRODUODENOSCOPY (EGD);  Surgeon: Rosendo Shaw MD;  Location:  GI     HERNIA REPAIR  2017    Abdominal     LAMINECTOMY, FUSION LUMBAR THREE+ LEVEL, COMBINED N/A 9/22/2014    Procedure: COMBINED LAMINECTOMY, FUSION LUMBAR THREE+ LEVEL;  Surgeon: Sebastien Pruitt MD;  Location:  OR     TONSILLECTOMY & ADENOIDECTOMY            Medical Review of Systems     /63   Pulse 69   Temp 98.7  F (37.1  C) (Oral)   Resp 19   Ht 1.702 m (5' 7\")   Wt 86.2 kg (190 lb)   SpO2 96%   BMI 29.76 kg/m    Body mass index is 29.76 kg/m .    Previous 10-point ROS completed by Jace Koenig MD on 6/10/2020 at 5:14 PM reviewed by Kareem Jacobs MD on June 11, 2020 and is unchanged except for those problems mentioned within the HPI.      Mental Status Examination     Appearance:  awake, alert, appeared as age stated and slightly unkempt  Attitude:  cooperative  Eye Contact:  fair  Mood:  sad  and depressed  Affect:  mood congruent, intensity is flat and restricted range  Speech:  clear, coherent and normal prosody  Psychomotor Behavior:  no evidence of tardive dyskinesia, dystonia, or tics  Thought Process:  logical, linear and goal oriented  Associations:  no loose associations  Thought Content:  no evidence of suicidal ideation or homicidal ideation and no evidence of psychotic " thought  Insight:  fair  Judgment:  limited  Oriented to:  time, person, and place  Attention Span and Concentration:  fair  Recent and Remote Memory:  fair  Language: Able to name objects, Able to repeat phrases and Able to read and write  Fund of Knowledge: low-normal     Labs     Labs reviewed.  Recent Results (from the past 24 hour(s))   Occult blood stool    Collection Time: 06/10/20  2:41 PM   Result Value Ref Range    Occult Blood Positive (A) NEG^Negative   EKG 12 lead    Collection Time: 06/10/20  4:13 PM   Result Value Ref Range    Interpretation ECG Click View Image link to view waveform and result    Drug abuse screen 77 urine (FL, RH, SH)    Collection Time: 06/10/20  4:25 PM   Result Value Ref Range    Amphetamine Qual Urine Negative NEG^Negative    Barbiturates Qual Urine Negative NEG^Negative    Benzodiazepine Qual Urine Negative NEG^Negative    Cannabinoids Qual Urine Negative NEG^Negative    Cocaine Qual Urine Negative NEG^Negative    Opiates Qualitative Urine Negative NEG^Negative    PCP Qual Urine Negative NEG^Negative   Hemoglobin    Collection Time: 06/10/20  9:49 PM   Result Value Ref Range    Hemoglobin 9.0 (L) 13.3 - 17.7 g/dL   Basic metabolic panel    Collection Time: 06/11/20  6:02 AM   Result Value Ref Range    Sodium 136 133 - 144 mmol/L    Potassium 4.0 3.4 - 5.3 mmol/L    Chloride 104 94 - 109 mmol/L    Carbon Dioxide 26 20 - 32 mmol/L    Anion Gap 6 3 - 14 mmol/L    Glucose 98 70 - 99 mg/dL    Urea Nitrogen 31 (H) 7 - 30 mg/dL    Creatinine 1.75 (H) 0.66 - 1.25 mg/dL    GFR Estimate 40 (L) >60 mL/min/[1.73_m2]    GFR Estimate If Black 46 (L) >60 mL/min/[1.73_m2]    Calcium 7.6 (L) 8.5 - 10.1 mg/dL   Hemoglobin    Collection Time: 06/11/20  6:02 AM   Result Value Ref Range    Hemoglobin 8.3 (L) 13.3 - 17.7 g/dL   UPPER GI ENDOSCOPY    Collection Time: 06/11/20 10:44 AM   Result Value Ref Range    Upper GI Endoscopy       [facility]  Department  _______________________________________________________________________________  Patient Name: Jim Richard        Procedure Date: 6/11/2020 10:44 AM  MRN: [id number]                       Account Number: [id number]  YOB: 1955               Admit Type: Inpatient  Age: 65                               Room: 2  Note Status: Finalized                Attending MD: Rosendo Shwa MD  Total Sedation Time:                  Instrument Name: 405 GIF- Gastroscope  _______________________________________________________________________________     Procedure:                Upper GI endoscopy  Indications:              Melena  Providers:                Rosendo Shaw MD, Candy Plascencia, RN  Referring MD:             Talon Elias MD  Medicines:                Monitored Anesthesia Care  Complications:            No immediate complications.  _____________________________________________________________________ __________  Procedure:                Pre-Anesthesia Assessment:                            - Prior to the procedure, a History and Physical                             was performed, and patient medications and                             allergies were reviewed. The patient is competent.                             The risks and benefits of the procedure and the                             sedation options and risks were discussed with the                             patient. All questions were answered and informed                             consent was obtained. Patient identification and                             proposed procedure were verified by the physician                             in the pre-procedure area. Mental Status                             Examination: alert and oriented. Airway                             Examination: normal oropharyngeal airway and neck                             mobility. Respiratory Examination: clear to                              auscul tation. CV Examination: normal. Prophylactic                             Antibiotics: The patient does not require                             prophylactic antibiotics. Prior Anticoagulants: The                             patient has taken no previous anticoagulant or                             antiplatelet agents. ASA Grade Assessment: II - A                             patient with mild systemic disease. After reviewing                             the risks and benefits, the patient was deemed in                             satisfactory condition to undergo the procedure.                             The anesthesia plan was to use moderate sedation /                             analgesia (conscious sedation). Immediately prior                             to administration of medications, the patient was                             re-assessed for adequacy to receive sedatives. The                             heart rate, respiratory rate, oxygen saturations,                              blood pressure, adequacy of pulmonary ventilation,                             and response to care were monitored throughout the                             procedure. The physical status of the patient was                             re-assessed after the procedure.                            After obtaining informed consent, the endoscope was                             passed under direct vision. Throughout the                             procedure, the patient's blood pressure, pulse, and                             oxygen saturations were monitored continuously. The                             Endoscope was introduced through the mouth, and                             advanced to the second part of duodenum. The upper                             GI endoscopy was accomplished without difficulty.                             The patient tolerated the procedure well.                                                                                    Findings:       Grade I varices w ere found in the middle third of the esophagus and in        the lower third of the esophagus. They were 5 mm in largest diameter.       Food was found in the lower third of the esophagus.       Moderte portal gastropathy.       The cardia and gastric fundus were normal on retroflexion.       The duodenal bulb, first portion of the duodenum and second portion of        the duodenum were normal.                                                                                   Impression:               - Grade I esophageal varices.                            - Food in the lower third of the esophagus.                            - Normal duodenal bulb, first portion of the                             duodenum and second portion of the duodenum.                            - No specimens collected.  Recommendation:           - Please continue to follow with Primary care                             Physician.                            - Anti Relux Precautions                            - Re turn patient to hospital basilio.                            - Resume regular diet.                                                                                   Procedure Code(s):       --- Professional ---       15401, Esophagogastroduodenoscopy, flexible, transoral; diagnostic,        including collection of specimen(s) by brushing or washing, when        performed (separate procedure)  Diagnosis Code(s):       --- Professional ---       I85.00, Esophageal varices without bleeding       T18.128A, Food in esophagus causing other injury, initial encounter       K92.1, Melena (includes Hematochezia)    CPT copyright 2018 [employer] Association. All rights reserved.    The codes documented in this report are preliminary and upon  review may   be revised to meet current compliance requirements.    Electronically Signed by Rosendo Ag  __________________  Rosendo  STEPHEN Shaw MD  6/11/2020 11:56:09 AM  I was physically present for the entire viewing portion of the exam.  Rosendo calderon MD  Number of Addenda: 0    Note Initiated On: 6/11/2020 10:44 AM  MRN:                      [id number]  Procedure Date:           6/11/2020 10:44:51 AM  Total Procedure Duration: 0 hours 4 minutes 52 seconds   Estimated Blood Loss:       Scope In: 11:39:56 AM  Scope Out: 11:44:48 AM          Impression     Jim Richard is a 65 year old male with established history of major depressive disorder and alcohol use disorder in addition to alcoholic cirrhosis with ascites, JANIS, history of GI bleed with esophageal varices who presented to [facility] ED on account of alcohol intoxication and suicidal ideation.  Psychiatry was consulted to render an opinion on his alcohol use and suicidality.  Patient currently denies active self-harm thoughts plans or intent and is open to pursuing treatment recommendations later by his county .     Diagnoses     1. Major depressive disorder, recurrent, moderate.  2. Alcohol use disorder, severe.  3. Thrombocytopenia.  4. Chronic anemia  5. Alcoholic cirrhosis with ascites  6. Medication noncompliance     Plan     1. Explained side effects, benefits and complications of medications to the patient.  2. Medication Changes: Resume antidepressant medication regimen.  3. Discussed treatment plan with patient and team.  4. His case was discussed with the unit  who reached out to his county  and verified that she is vacating his stay of commitment and planning to refer him to intensive residential treatment services setting.  Patient is agrees with this plan.  5. Re-consult Psychiatry as needed.    Attestation:   Patient has been seen and evaluated by Kareem denton MD.    Patient ID:  Name: Jim Richard MRN: [id number]  Admission: 6/10/2020 YOB: 1955

## 2020-06-11 NOTE — CONSULTS
6/11/20 chem dep consult acknowledged and closed.      Patient is familiar to chem dep from multiple previous admissions.  I staffed care plan with unit , Bre BENNETT, who confirmed patient's [facility]  has revoked his stay of commitment and plan is for patient to enter IRTS facility.  Per social work note today,  is working in finding placement.  At this time, patient would not need chem dep consult given county his determining and arranging services following discharge.  Will close consult.    Sherly Royal [facility]  [phone]

## 2020-06-11 NOTE — CONSULTS
Pt seen for initial psychiatric evaluation, please see my dictation for details and recommendations. He denies suicidal or homicidal ideation. His Stay of Commitment is being revoked by his county  with plan to transition him to an IRTS. We will restart his antidepressant regimen.     Kareem Jacobs MD

## 2020-06-11 NOTE — PROGRESS NOTES
[facility]  Hospitalist Progress Note        Jace Koenig MD   06/11/2020        Interval History:      - no acute issues overnight, no system of active bleed, reports clinically feeling better, seems less shaky today ; planned for EGD         Assessment and Plan:        Jim Richard is a 65 year old male with PMH significant for alcohol abuse, alcoholic cirrhosis with ascites, JANIS, depression and anxiety, history of G.I. bleed with esophageal varices who presented to ER with alcohol intoxication and suicidal ideation. While in ED he also reported some dark stools and is being admitted for further evaluation of G.I. bleed     #Likely G.I. bleed with history of esophagitis and esophageal varices  #chronic anemia and thrombocytopenia  -currently hemodynamically stable with no suggestion of active bleed  - baseline hemoglobin 8-9; hb 9.6---9.0----8.3  - evaluated by GI, EGD 6/11 noted again with grade 1 esophageal varices, moderate portal gastropathy  - will continue Hb monitoring q 8 hrs and plan to transfuse prn for Hb <7  - continue Protonix IV BID  - Rocephin IV for SBP prophylaxis     #Alcohol abuse/dependence  #alcoholic cirrhosis with ascites  #medication noncompliance  #Anxiety and depression  -has stopped taking all his medications recently  -had ultrasound-guided paracentesis done in ED 5/10; no 4 L removed  -continue to hold off on PTA Lasix and Aldactone for now given slight worsening renal function and while getting cautious IV hydration  -blood alcohol level on admission 0.32, at risk for withdrawal  - continue Valium per [facility] protocol  - evaluated by psych psychiatry, suggested to resume PTA antidepressants which he has been non-compliant with; resumed PTA Remeron, Seroquel, trazodone, Trintellix  - chemical dependency evaluation pending;  apparently his commitment is been removed,  for disposition planning  -continue clonidine for tremors  - denies any any  "suicidal ideations     #Acute on CKD  -baseline creatinine around 1.3-1.5  -current creatinine 1.7, likely prerenal secondary to alcohol abuse and poor PO intake   - hold off on PTA Lasix, Aldactone  - continue cautious IV hydration with normal saline at 75 ML per hour and monitor BMP  - Cr 1.72---1.75     #BPH: continue Flomax     #COPD  -respiratory status stable continue; continue PTA inhalers     # DVT prophylaxis: mechanical with PCD boots  # Code status: full code    Disposition: likely 1-2 days pending GI clearance, CD eval and SW for disposition                     Physical Exam:      Blood pressure 114/63, pulse 75, temperature 98.7  F (37.1  C), temperature source Oral, resp. rate 18, height 1.702 m (5' 7\"), weight 86.2 kg (190 lb), SpO2 92 %.  Vitals:    06/10/20 0158   Weight: 86.2 kg (190 lb)     Vital Signs with Ranges  Temp:  [98.7  F (37.1  C)-99.6  F (37.6  C)] 98.7  F (37.1  C)  Pulse:  [66-79] 75  Heart Rate:  [66-79] 66  Resp:  [16-18] 18  BP: (114-163)/() 114/63  SpO2:  [92 %-96 %] 92 %  I/O's Last 24 hours  I/O last 3 completed shifts:  In: 480 [P.O.:480]  Out: -     Constitutional: Alert, awake and orienetd X 3; lying comfortably in bed in no apparent distress   HEENT: Pupils equal and reactive to light and accomodation, EOMI intact; neck supple no raised JVD or rigidity    Oral cavity: Dry oral mucosa   Cardiovascular: Normal s1 s2, regular rate and rhythm, no murmur   Lungs: B/l clear to auscultation, no wheezes or crepitations   Abdomen: Soft, distended, ascites +, nd, no guarding, rigidity or rebound; BS +   LE : Mild b/l edema +   Musculoskeletal: Power 5/5 in all extremities; less shaky /tremulous    Neuro: No focal neurological deficits noted, CN II to XII grossly intact   Psychiatry: normal mood and affect  Skin: No obvious skin rashes or ulcers                  Medications:          cefTRIAXone  1 g Intravenous Q24H     cloNIDine  0.1 mg Oral BID     fluticasone-vilanterol  1 " puff Inhalation Daily     folic acid  1 mg Oral Daily     mirtazapine  15 mg Oral At Bedtime     multivitamin w/minerals  1 tablet Oral Daily     pantoprazole (PROTONIX) IV  40 mg Intravenous Q12H     sodium chloride (PF)  3 mL Intracatheter Q8H     tamsulosin  0.4 mg Oral QPM     traZODone  100 mg Oral At Bedtime     vitamin B1  100 mg Oral Daily     vortioxetine  10 mg Oral Daily     PRN Meds: diazepam **OR** diazepam, lidocaine 4%, lidocaine (buffered or not buffered), melatonin, naloxone, ondansetron **OR** ondansetron, prochlorperazine **OR** prochlorperazine **OR** prochlorperazine, QUEtiapine, QUEtiapine, sodium chloride (PF)         Data:      All new lab and imaging data was reviewed.   Recent Labs   Lab Test 06/11/20  0602 06/10/20  2149 06/10/20  0206 05/26/20  1215 05/14/20  2322   WBC  --   --  5.9 2.8* 7.0   HGB 8.3* 9.0* 9.6* 8.7* 9.5*   MCV  --   --  89 92 88   PLT  --   --  161 78* 205   INR  --   --  1.22* 1.30* 1.23*      Recent Labs   Lab Test 06/11/20  0602 06/10/20  0206 05/26/20  1215    140 132*   POTASSIUM 4.0 4.1 3.6   CHLORIDE 104 106 100   CO2 26 23 17*   BUN 31* 34* 31*   CR 1.75* 1.72* 1.43*   ANIONGAP 6 11 15*   KEV 7.6* 8.0* 8.6   GLC 98 85 75     Recent Labs   Lab Test 06/10/20  0206 04/26/18  1940   TROPI 0.023 <0.015

## 2020-06-11 NOTE — CONSULTS
Care Transition Initial Assessment -      Met with: patient  Active Problems:    GI bleed       DATA  Lives With: alone     Identified issues/concerns regarding health management:   This hospital petitioned for patient to committed to a CD program this past winter and he was committed to the [employer] on March 23,2020.  He discharged from our hospital to [facility].  He was returned to our hospital after reporting to [place] staff suicidal thoughts.  Patient had a stay on [facility].   His [facility] , Omayra Angela ([phone]) gives the following information to writer.   Patient was provissionallyl discharged home from [facility] and was to follow out patient CD programming however with COVID restrictions the out patient programming was difficult for patient to obtain as he doesn't have a computer.   Since discharge from [facility] patient has had two relapses.  His [facility]  Omayra Angela requested his provisional discharge be revoke.  An order was signed yesterday by Judge Alfredo Sheets.    ASSESSMENT  Cognitive Status:  Patient presents alert and oriented however during previous stays has demonstrated rosanne poor short term memory retention.   Concerns to be addressed: Discharge plan.  Ms Angela recommends patient be transferred to an MI/CD , [facility] ([facility]).  She is making referrals.    This was discussed with Dr Jacobs, Sherly Royal  and patient.      PLAN  Once Ms Stahl has located IRTS facilities with vancancies, writer will fax clinical information.  Patient needs to be kept here until placement is made.

## 2020-06-11 NOTE — CONSULTS
[facility]  Gastroenterology Consultation         Jim Richard  [address] [id number]  65 year old male    Admission Date/Time: 6/10/2020  Primary Care Provider: Talon Elias  Referring / Attending Physician:  Dr. Jace Koenig    We were asked to see the patient in consultation by Dr. Jace Koenig for evaluation GI bleed.      CC: GI bleed    HPI:  Jim Richard is a 65 year old male who presented with alcohol intoxication.  He has a past medical history markable for cirrhosis with ascites, esophageal varices, depression, anxiety, alcohol use disorder, sleep apnea, COPD and anemia. While in ED had episodes of melenotic stools He was hospitalized at [facility] approximately 1 month ago secondary to weakness in alcohol withdrawal.  He was discharged home and began drinking alcohol shortly after discharge. HE has been non compliant with medications. He failed to f/u in the outpatient setting. He has been admitted 6 times in last year with multiple ED visits.  Has had problems with this in the past and has always been related to alcohol. . He had abdominal distention and bloating with last paracentesis done yesterday removing 4 L of fluid. He denies fever, chills, cough, abdominal pain, nausea or vomiting. He denies chest pain or palpitations.. He has had prior EGD in 3/8/2020 for anemia and does have noted portal hypertension. His last colonoscopy done 03/2020 noted polyp and no other abnormal findings.     Vital stable. Sodium 136, Creatinine 1.75, albumin 2.7, , AST 89. WBC 5.9, Hemoglobin 8.3 (baseline 9-9.5). INR 1.22.    ROS: A comprehensive ten point review of systems was negative aside from those in mentioned in the HPI.      PAST MED HX:  I have reviewed this patient's medical history and updated it with pertinent information if needed.   Past Medical History:   Diagnosis Date     Alcoholism (H) 1/14/2013    had treatment at [facility]      Anxiety      Coronary artery disease 09/09/2014    Nuclear stress test with slight fixed defect inferiorly, no ischemia     Depression     w/anxiety     Esophageal varices with bleeding 04/28/2018    6 varices banded on EGD     Heart attack (H)      Hepatomegaly     Fatty liver, chronic alcoholic     Hyperlipemia      Hypertension      JANIS on CPAP      Peripheral vascular disease (H)      PVD (peripheral vascular disease) (H)      SDH (subdural hematoma) (H) 04/26/2018    Right side, resolved spontaneously     Spinal stenosis        MEDICATIONS:   Prior to Admission Medications   Prescriptions Last Dose Informant Patient Reported? Taking?   QUEtiapine (SEROQUEL) 50 MG tablet Past Week at Unknown time Self No Yes   Sig: Take 1 tablet (50 mg) by mouth nightly as needed (sleep)   fluticasone-vilanterol (BREO ELLIPTA) 200-25 MCG/INH inhaler Past Week at Unknown time Self No Yes   Sig: Inhale 1 puff into the lungs daily   furosemide (LASIX) 40 MG tablet Past Week at Unknown time Self No Yes   Sig: Take 1 tablet (40 mg) by mouth daily   mirtazapine (REMERON) 15 MG tablet Past Week at Unknown time Self No Yes   Sig: Take 1 tablet (15 mg) by mouth At Bedtime   multivitamin w/minerals (THERA-VIT-M) tablet Past Week at Unknown time Self No Yes   Sig: Take 1 tablet by mouth daily   order for DME  Self No No   Sig: Equipment being ordered: Walker Wheels () and Walker ()  Treatment Diagnosis: difficulty walking   pantoprazole (PROTONIX) 40 MG EC tablet Past Week at Unknown time Self No Yes   Sig: Take 1 tablet (40 mg) by mouth every morning (before breakfast)   spironolactone (ALDACTONE) 50 MG tablet Not Taking at Unknown time Self No No   Sig: Take 1 tablet (50 mg) by mouth daily   Patient not taking: Reported on 6/10/2020   tamsulosin (FLOMAX) 0.4 MG capsule Past Week at Unknown time Self No Yes   Sig: Take 1 capsule (0.4 mg) by mouth every evening   traZODone (DESYREL) 100 MG tablet Past Week at Unknown time Self No  Yes   Sig: Take 1 tablet (100 mg) by mouth At Bedtime   vortioxetine (TRINTELLIX) 10 MG tablet Past Week at Unknown time Self No Yes   Sig: Take 1 tablet (10 mg) by mouth daily      Facility-Administered Medications: None       ALLERGIES:   Allergies   Allergen Reactions     Amlodipine Swelling     Lisinopril      Other reaction(s): Angioedema  Mouth and tongue swelling   Mouth and tongue swelling          SOCIAL HISTORY:  Social History     Tobacco Use     Smoking status: Current Every Day Smoker     Packs/day: 1.00     Types: Cigarettes     Smokeless tobacco: Never Used     Tobacco comment: Chantix caused nightmares   Substance Use Topics     Alcohol use: Yes     Comment: Last drank Yesterday drinks 750 ml of vodka daily     Drug use: No       FAMILY HISTORY:  Family History   Problem Relation Age of Onset     Mental Illness Son      Coronary Artery Disease Early Onset Mother      Substance Abuse Father      Lung Cancer Father      Substance Abuse Brother      Unknown/Adopted No family hx of      Depression No family hx of      Anxiety Disorder No family hx of      Schizophrenia No family hx of      Bipolar Disorder No family hx of      Suicide No family hx of      Dementia No family hx of      Anthony Disease No family hx of      Parkinsonism No family hx of      Autism Spectrum Disorder No family hx of      Intellectual Disability (Mental Retardation) No family hx of        PHYSICAL EXAM:   General  Alert, oriented and comfortable  Vital Signs with Ranges  Temp: 98.7  F (37.1  C) Temp src: Oral BP: 114/63 Pulse: 75 Heart Rate: 66 Resp: 18 SpO2: 92 % O2 Device: None (Room air)    I/O last 3 completed shifts:  In: 480 [P.O.:480]  Out: -     Constitutional: healthy, alert, mild distress, cooperative and pale   Cardiovascular: negative, PMI normal. No lifts, heaves, or thrills. RRR. No murmurs, clicks gallops or rub  Respiratory: negative, Percussion normal. Good diaphragmatic excursion. Lungs clear  Head:  Normocephalic. No masses, lesions, tenderness or abnormalities  Neck: Neck supple. No adenopathy. Thyroid symmetric, normal size,, Carotids without bruits.  Abdomen: Abdomen soft, distended, minimal fluid, non-tender. BS normal. No masses, organomegaly          ADDITIONAL COMMENTS:   I reviewed the patient's new clinical lab test results.   Recent Labs   Lab Test 06/11/20  0602 06/10/20  2149 06/10/20  0206 05/26/20  1215 05/14/20  2322   WBC  --   --  5.9 2.8* 7.0   HGB 8.3* 9.0* 9.6* 8.7* 9.5*   MCV  --   --  89 92 88   PLT  --   --  161 78* 205   INR  --   --  1.22* 1.30* 1.23*     Recent Labs   Lab Test 06/11/20  0602 06/10/20  0206 05/26/20  1215   POTASSIUM 4.0 4.1 3.6   CHLORIDE 104 106 100   CO2 26 23 17*   BUN 31* 34* 31*   ANIONGAP 6 11 15*     Recent Labs   Lab Test 06/10/20  0206 05/26/20  1612 05/26/20  1215 05/14/20  2322  02/03/20  0218  01/11/20  1730  11/13/19  1858 11/13/19  1849   ALBUMIN 2.7*  --  2.9* 2.8*   < > 2.7*   < >  --    < > 3.0*  --    BILITOTAL 1.0  --  1.7* 0.7   < > 1.6*   < >  --    < > 1.0  --    ALT 30  --  29 26   < > 26   < >  --    < > 24  --    AST 89*  --  65* 45   < > 61*   < >  --    < > 58*  --    PROTEIN  --  30*  --   --   --   --   --  Negative  --   --  30*   LIPASE  --   --  346  --   --  369  --   --   --  499*  --     < > = values in this interval not displayed.       I reviewed the patient's new imaging results.        CONSULTATION ASSESSMENT AND PLAN:    Jim Richard is a pleasant 65 year old male with alcohol liver cirrhosis that presented with complaints of alcohol intoxication and melena. He had underwent ultrasound guided paracentesis yesterday removed 4 L of fluid. GI consulted for evaluation of melena.     Active Problems:    Alcohol withdrawal (H)  Alcoholic Liver Cirrhosis with ascites    Patient has known alcohol abuse and dependence and consumes 0.75 L of vodka daily. Has developed alcohol liver cirrhosis with ascites. Last EGD noted grade I  esophageal varices in 03/2020. Hemoglobin slightly below baseline (9-9.5 ) at 8.3. Paracentesis performed yesterday and 4L of fluid removed. Patient has likely had exacerbation of symptoms associated with liver cirrhosis due to chronic intake of alcohol. His MELD score is 14. He appears comfortable and is tolerating alcohol withdrawal. Cause of melena likely related to gastric ulcer or variceal bleed ( unlikely given fairly stable hemoglobin)     - Recommend EGD this a.m.  - NPO  - Agree with psychiatry consult,, social work consult and may benefit from chemical dependency counseling  - Patient will need daily CBC and CMP  - Draw ammonia tomorrow  - Continue with IV pantoprazole 40 mg BID  - Creatinine above baseline- once improved with fluid resuscitation will need to consider restarting diuretics given significant ascites ( furosemide 20 mg and spironolactone 100 mg)  - Absolute alcohol abstinence is necessary- patient aware that he is at high risk of mortality and morbidity if he continues to drink alcohol  - Continue [facility] protocol        [facility].  Office: [phone]  Cell : [phone] (Dr. Shaw)  Cell: [phone] (Gloria De Leon PA-C)

## 2020-06-12 ENCOUNTER — APPOINTMENT (OUTPATIENT)
Dept: ULTRASOUND IMAGING | Facility: CLINIC | Age: 65
DRG: 432 | End: 2020-06-12
Attending: INTERNAL MEDICINE
Payer: MEDICARE

## 2020-06-12 LAB
AMMONIA PLAS-SCNC: 44 UMOL/L (ref 10–50)
ANION GAP SERPL CALCULATED.3IONS-SCNC: 6 MMOL/L (ref 3–14)
BUN SERPL-MCNC: 29 MG/DL (ref 7–30)
CALCIUM SERPL-MCNC: 7.8 MG/DL (ref 8.5–10.1)
CHLORIDE SERPL-SCNC: 102 MMOL/L (ref 94–109)
CO2 SERPL-SCNC: 24 MMOL/L (ref 20–32)
CREAT SERPL-MCNC: 1.89 MG/DL (ref 0.66–1.25)
CREAT UR-MCNC: 248 MG/DL
GFR SERPL CREATININE-BSD FRML MDRD: 36 ML/MIN/{1.73_M2}
GLUCOSE SERPL-MCNC: 125 MG/DL (ref 70–99)
HGB BLD-MCNC: 8.4 G/DL (ref 13.3–17.7)
MICROALBUMIN UR-MCNC: 75 MG/L
MICROALBUMIN/CREAT UR: 30.16 MG/G CR (ref 0–17)
POTASSIUM SERPL-SCNC: 3.8 MMOL/L (ref 3.4–5.3)
SODIUM SERPL-SCNC: 132 MMOL/L (ref 133–144)
SODIUM UR-SCNC: <5 MMOL/L

## 2020-06-12 PROCEDURE — 25000132 ZZH RX MED GY IP 250 OP 250 PS 637: Mod: GY | Performed by: HOSPITALIST

## 2020-06-12 PROCEDURE — 36415 COLL VENOUS BLD VENIPUNCTURE: CPT | Performed by: PHYSICIAN ASSISTANT

## 2020-06-12 PROCEDURE — 82043 UR ALBUMIN QUANTITATIVE: CPT | Performed by: INTERNAL MEDICINE

## 2020-06-12 PROCEDURE — 85018 HEMOGLOBIN: CPT | Performed by: HOSPITALIST

## 2020-06-12 PROCEDURE — C9113 INJ PANTOPRAZOLE SODIUM, VIA: HCPCS | Performed by: HOSPITALIST

## 2020-06-12 PROCEDURE — 99232 SBSQ HOSP IP/OBS MODERATE 35: CPT | Performed by: HOSPITALIST

## 2020-06-12 PROCEDURE — 25000128 H RX IP 250 OP 636: Performed by: HOSPITALIST

## 2020-06-12 PROCEDURE — 82140 ASSAY OF AMMONIA: CPT | Performed by: PHYSICIAN ASSISTANT

## 2020-06-12 PROCEDURE — P9047 ALBUMIN (HUMAN), 25%, 50ML: HCPCS | Performed by: INTERNAL MEDICINE

## 2020-06-12 PROCEDURE — 25000128 H RX IP 250 OP 636: Performed by: INTERNAL MEDICINE

## 2020-06-12 PROCEDURE — 12000000 ZZH R&B MED SURG/OB

## 2020-06-12 PROCEDURE — 84300 ASSAY OF URINE SODIUM: CPT | Performed by: INTERNAL MEDICINE

## 2020-06-12 PROCEDURE — 80048 BASIC METABOLIC PNL TOTAL CA: CPT | Performed by: HOSPITALIST

## 2020-06-12 PROCEDURE — 76770 US EXAM ABDO BACK WALL COMP: CPT

## 2020-06-12 RX ORDER — LANOLIN ALCOHOL/MO/W.PET/CERES
3 CREAM (GRAM) TOPICAL
Status: DISCONTINUED | OUTPATIENT
Start: 2020-06-12 | End: 2020-06-19

## 2020-06-12 RX ORDER — PANTOPRAZOLE SODIUM 40 MG/1
40 TABLET, DELAYED RELEASE ORAL
Status: DISCONTINUED | OUTPATIENT
Start: 2020-06-13 | End: 2020-07-02 | Stop reason: HOSPADM

## 2020-06-12 RX ORDER — PANTOPRAZOLE SODIUM 40 MG/1
40 TABLET, DELAYED RELEASE ORAL
Status: DISCONTINUED | OUTPATIENT
Start: 2020-06-13 | End: 2020-06-12

## 2020-06-12 RX ORDER — ALBUMIN (HUMAN) 12.5 G/50ML
25 SOLUTION INTRAVENOUS EVERY 6 HOURS
Status: COMPLETED | OUTPATIENT
Start: 2020-06-12 | End: 2020-06-13

## 2020-06-12 RX ADMIN — VORTIOXETINE 10 MG: 10 TABLET, FILM COATED ORAL at 08:28

## 2020-06-12 RX ADMIN — ALBUMIN HUMAN 25 G: 0.25 SOLUTION INTRAVENOUS at 14:22

## 2020-06-12 RX ADMIN — Medication 100 MG: at 08:28

## 2020-06-12 RX ADMIN — PANTOPRAZOLE SODIUM 40 MG: 40 INJECTION, POWDER, FOR SOLUTION INTRAVENOUS at 17:49

## 2020-06-12 RX ADMIN — CLONIDINE HYDROCHLORIDE 0.1 MG: 0.1 TABLET ORAL at 08:28

## 2020-06-12 RX ADMIN — TRAZODONE HYDROCHLORIDE 100 MG: 50 TABLET ORAL at 21:28

## 2020-06-12 RX ADMIN — FLUTICASONE FUROATE AND VILANTEROL TRIFENATATE 1 PUFF: 200; 25 POWDER RESPIRATORY (INHALATION) at 08:29

## 2020-06-12 RX ADMIN — FOLIC ACID 1 MG: 1 TABLET ORAL at 08:28

## 2020-06-12 RX ADMIN — CLONIDINE HYDROCHLORIDE 0.1 MG: 0.1 TABLET ORAL at 20:03

## 2020-06-12 RX ADMIN — CEFTRIAXONE SODIUM 1 G: 1 INJECTION, POWDER, FOR SOLUTION INTRAMUSCULAR; INTRAVENOUS at 17:55

## 2020-06-12 RX ADMIN — QUETIAPINE 25 MG: 25 TABLET ORAL at 01:27

## 2020-06-12 RX ADMIN — PANTOPRAZOLE SODIUM 40 MG: 40 INJECTION, POWDER, FOR SOLUTION INTRAVENOUS at 05:59

## 2020-06-12 RX ADMIN — MIRTAZAPINE 15 MG: 15 TABLET, FILM COATED ORAL at 21:28

## 2020-06-12 RX ADMIN — MULTIPLE VITAMINS W/ MINERALS TAB 1 TABLET: TAB at 08:28

## 2020-06-12 RX ADMIN — TAMSULOSIN HYDROCHLORIDE 0.4 MG: 0.4 CAPSULE ORAL at 20:02

## 2020-06-12 RX ADMIN — ALBUMIN HUMAN 25 G: 0.25 SOLUTION INTRAVENOUS at 18:34

## 2020-06-12 ASSESSMENT — ACTIVITIES OF DAILY LIVING (ADL)
ADLS_ACUITY_SCORE: 13
ADLS_ACUITY_SCORE: 14

## 2020-06-12 NOTE — PROGRESS NOTES
[facility]  Gastroenterology Progress Note     Jim Richard MRN# [id number]   YOB: 1955 Age: 65 year old          Assessment and Plan:   Interval History: Patient tolerating diet. Has no complaints. NO report of melena or hematochezia. Denies n/v.     Jim Richard is a pleasant 65 year old male with alcohol liver cirrhosis that presented with complaints of alcohol intoxication and melena. He had underwent ultrasound guided paracentesis yesterday removed 4 L of fluid. GI consulted for evaluation of melena.         Alcohol withdrawal (H)  Alcoholic Liver Cirrhosis with ascites   Patient has known alcohol abuse and dependence and consumes 0.75 L of vodka daily. Has developed alcohol liver cirrhosis with ascites. Last EGD noted grade I esophageal varices in 03/2020. Hemoglobin slightly below baseline (9-9.5 ) at 8.3. Paracentesis performed yesterday and 4L of fluid removed. Patient has likely had exacerbation of symptoms associated with liver cirrhosis due to chronic intake of alcohol. His MELD score is 14. He appears comfortable and is tolerating alcohol withdrawal. Cause of melena likely related to gastric ulcer or variceal bleed ( unlikely given fairly stable hemoglobin). Hemoglobin stable at 8-9. Ammonia 44.     - 6/11 s/p EGD noting Grade I esophageal varices with no active signs of bleeding  - Appreciate psychiatry consult, social work consult, chemical dependency counseling  - Daily labs  - Draw ammonia tomorrow  - Continue with IV pantoprazole 40 mg BID  - Creatinine above baseline- once improved with fluid resuscitation will need to consider restarting diuretics given significant ascites ( furosemide 20 mg and spironolactone 100 mg)  - Absolute alcohol abstinence is necessary- patient aware that he is at high risk of mortality and morbidity if he continues to drink alcohol  - Continue CIWA protocol            Alcoholic intoxication without complication (H)  Suicidal  ideation  Hematemesis, presence of nausea not specified  Renal insufficiency      Interval History:   no new complaints, doing well, denies chest pain, denies shortness of breath, denies abdominal pain, alert, oriented to person, place and time, has had a bowel movement in the last 24 hours and doing well; no cp, sob, n/v/d, or abd pain.              Review of Systems:   C: NEGATIVE for fever, chills, change in weight  E/M: NEGATIVE for ear, mouth and throat problems  R: NEGATIVE for significant cough or SOB  CV: NEGATIVE for chest pain, palpitations or peripheral edema             Medications:   I have reviewed this patient's current medications    cefTRIAXone  1 g Intravenous Q24H     cloNIDine  0.1 mg Oral BID     fluticasone-vilanterol  1 puff Inhalation Daily     folic acid  1 mg Oral Daily     mirtazapine  15 mg Oral At Bedtime     multivitamin w/minerals  1 tablet Oral Daily     pantoprazole (PROTONIX) IV  40 mg Intravenous Q12H     sodium chloride (PF)  3 mL Intracatheter Q8H     tamsulosin  0.4 mg Oral QPM     traZODone  100 mg Oral At Bedtime     vitamin B1  100 mg Oral Daily     vortioxetine  10 mg Oral Daily                  Physical Exam:   Vitals were reviewed  Vital Signs with Ranges  Temp:  [97.9  F (36.6  C)-98.6  F (37  C)] 98.6  F (37  C)  Pulse:  [57-74] 74  Heart Rate:  [61-86] 74  Resp:  [12-36] 17  BP: (100-141)/(57-71) 118/70  SpO2:  [93 %-98 %] 94 %  I/O last 3 completed shifts:  In: 2185 [P.O.:640; I.V.:1545]  Out: -   Constitutional: healthy, alert and no distress   Cardiovascular: negative, PMI normal. No lifts, heaves, or thrills. RRR. No murmurs, clicks gallops or rub  Respiratory: negative, Percussion normal. Good diaphragmatic excursion. Lungs clear  Head: Normocephalic. No masses, lesions, tenderness or abnormalities  Neck: Neck supple. No adenopathy. Thyroid symmetric, normal size,, Carotids without bruits.  Abdomen: Abdomen soft, non-tender. BS normal. No masses, organomegaly              Data:   I reviewed the patient's new clinical lab test results.   Recent Labs   Lab Test 06/12/20  0557 06/11/20  2226 06/11/20  1403  06/10/20  0206 05/26/20 1215 05/14/20 2322   WBC  --   --   --   --  5.9 2.8* 7.0   HGB 8.4* 8.5* 8.4*   < > 9.6* 8.7* 9.5*   MCV  --   --   --   --  89 92 88   PLT  --   --   --   --  161 78* 205   INR  --   --   --   --  1.22* 1.30* 1.23*    < > = values in this interval not displayed.     Recent Labs   Lab Test 06/12/20 0557 06/11/20  0602 06/10/20  0206   POTASSIUM 3.8 4.0 4.1   CHLORIDE 102 104 106   CO2 24 26 23   BUN 29 31* 34*   ANIONGAP 6 6 11     Recent Labs   Lab Test 06/10/20  0206 05/26/20  1612 05/26/20 1215 05/14/20  2322  02/03/20 0218  01/11/20  1730  11/13/19  1858 11/13/19  1849   ALBUMIN 2.7*  --  2.9* 2.8*   < > 2.7*   < >  --    < > 3.0*  --    BILITOTAL 1.0  --  1.7* 0.7   < > 1.6*   < >  --    < > 1.0  --    ALT 30  --  29 26   < > 26   < >  --    < > 24  --    AST 89*  --  65* 45   < > 61*   < >  --    < > 58*  --    PROTEIN  --  30*  --   --   --   --   --  Negative  --   --  30*   LIPASE  --   --  346  --   --  369  --   --   --  499*  --     < > = values in this interval not displayed.       I reviewed the patient's new imaging results.    All laboratory data reviewed  All imaging studies reviewed by me.    Gloria De Leon PA-C,  6/12/2020  [facility]  Office : [phone]  Cell: [phone] (Dr. Shaw)  Cell: [phone] (Gloria De Leon PA-C)

## 2020-06-12 NOTE — PLAN OF CARE
DATE & TIME: 6/11/2020 2771-4116  Cognitive Concerns/ Orientation : A&O x4  BEHAVIOR & AGGRESSION TOOL COLOR: Green  CIWA SCORE: 1/1 (mild tremors)  ABNL VS/O2: VSS, on RA. HTN at times, scheduled clonidine.  MOBILITY: SBA, gaitbelt, walker  PAIN MANAGMENT: Denies  DIET: Regular diet.  BOWEL/BLADDER: Continent. No BM this shift.  ABNL LAB/BG: Q8 Hgb checks 8.4. Creat 1.75. Albumin 2.7.  DRAIN/DEVICES: R PIV SL, discontinued IVF.  TELEMETRY RHYTHM: NA  SKIN: WDL  TESTS/PROCEDURES: EGD completed today. Unremarkable.  D/C DAY/GOALS/PLACE: Discharge date/time/disposition unknown - per CD note says stay of commitment was revoked and  is working on placement.   OTHER IMPORTANT INFO: GI, psych, chem dep, and SW following. Nursing will continue to monitor.  MD/RN ROUNDING SIGNED OFF D/E SHIFT: Yes  COMMIT TO SIT DONE AND SIGNED OFF: Yes

## 2020-06-12 NOTE — PROGRESS NOTES
[facility]  Hospitalist Progress Note        Jace Koenig MD   06/12/2020        Interval History:      - EGD completed yesterday; reported with difficulty sleeping, requesting sleeping aid; no episodes of melena    Melatonin prn ordered for sleep         Assessment and Plan:        Jim Richard is a 65 year old male with PMH significant for alcohol abuse, alcoholic cirrhosis with ascites, JANIS, depression and anxiety, history of G.I. bleed with esophageal varices who presented to ER with alcohol intoxication and suicidal ideation. While in ED he also reported some dark stools and is admitted for further evaluation of G.I. bleed     #Likely G.I. bleed with history of esophagitis and esophageal varices  #chronic anemia and thrombocytopenia  - hemodynamically stable with no further melena  - baseline hemoglobin 8-9; hb 9.6---9.0----8.3---stabilizing around 8-8.5  - evaluated by GI, EGD 6/11 noted again with grade 1 esophageal varices, moderate portal gastropathy  - on Protonix IV BID, will switch to PO on 6/13  - Rocephin IV for SBP prophylaxis, can probably discontinue 6/13     #Alcohol abuse/dependence  #alcoholic cirrhosis with ascites  #medication noncompliance  #Anxiety and depression  -has stopped taking all his medications recently  -had ultrasound-guided paracentesis done in ED 5/10; no 4 L removed  -continue to hold off on PTA Lasix and Aldactone for now given worsening renal function   -blood alcohol level on admission 0.32,   - continue Valium per CIWA protocol, no significant withdrawal  - evaluated by psych psychiatry, suggested to resume PTA antidepressants which he has been non-compliant with; continue PTA Remeron, Seroquel, trazodone, Trintellix  - apparently his commitment is been removed,  for disposition planning-- working on [facility]  - continue clonidine for tremors  - denies any any suicidal ideations     #Acute on  "CKD  -baseline creatinine around 1.3-1.5  -creatinine on admission 1.7, likely prerenal secondary to alcohol abuse and poor PO intake; held off on PTA Lasix, Aldactone and started on cautious IV hydration with normal saline at 75 ML per hour but renal function worsening; IVF d/abhijeet on 6/11 evening  - Cr 1.72---1.75---1.89  - consulted nephrology, suggested no more diuretics (might need frequent paracentesis for ascites); getting renal US, urine protein, urine sodium; plan to resume aldactone when cr stable; monitor BMP     #BPH: continue Flomax     #COPD  -respiratory status stable continue; continue PTA inhalers     # DVT prophylaxis: mechanical with PCD boots  # Code status: full code    Disposition: likely 1-2 days pending placement, improvement in renal function; SW for disposition                     Physical Exam:      Blood pressure 118/70, pulse 74, temperature 98.6  F (37  C), temperature source Oral, resp. rate 17, height 1.702 m (5' 7\"), weight 86.2 kg (190 lb), SpO2 94 %.  Vitals:    06/10/20 0158   Weight: 86.2 kg (190 lb)     Vital Signs with Ranges  Temp:  [97.9  F (36.6  C)-98.6  F (37  C)] 98.6  F (37  C)  Pulse:  [57-74] 74  Heart Rate:  [61-86] 74  Resp:  [12-36] 17  BP: (100-141)/(57-71) 118/70  SpO2:  [93 %-98 %] 94 %  I/O's Last 24 hours  I/O last 3 completed shifts:  In: 2185 [P.O.:640; I.V.:1545]  Out: -     Constitutional: Alert, awake and orienetd X 3; lying comfortably in bed in no apparent distress   HEENT: Pupils equal and reactive to light and accomodation, EOMI intact; neck supple no raised JVD or rigidity    Oral cavity: Moist oral mucosa   Cardiovascular: Normal s1 s2, regular rate and rhythm, no murmur   Lungs: B/l clear to auscultation, no wheezes or crepitations   Abdomen: Soft, distended, ascites +, nd, no guarding, rigidity or rebound; BS +   LE : Mild b/l edema +   Musculoskeletal: Power 5/5 in all extremities; less shaky /tremulous    Neuro: No focal neurological deficits " noted, CN II to XII grossly intact   Psychiatry: normal mood and affect  Skin: No obvious skin rashes or ulcers                  Medications:          cefTRIAXone  1 g Intravenous Q24H     cloNIDine  0.1 mg Oral BID     fluticasone-vilanterol  1 puff Inhalation Daily     folic acid  1 mg Oral Daily     mirtazapine  15 mg Oral At Bedtime     multivitamin w/minerals  1 tablet Oral Daily     pantoprazole (PROTONIX) IV  40 mg Intravenous Q12H     sodium chloride (PF)  3 mL Intracatheter Q8H     tamsulosin  0.4 mg Oral QPM     traZODone  100 mg Oral At Bedtime     vitamin B1  100 mg Oral Daily     vortioxetine  10 mg Oral Daily     PRN Meds: diazepam **OR** diazepam, lidocaine 4%, lidocaine (buffered or not buffered), - MEDICATION INSTRUCTIONS -, melatonin, naloxone, ondansetron **OR** ondansetron, prochlorperazine **OR** prochlorperazine **OR** prochlorperazine, QUEtiapine, QUEtiapine, sodium chloride (PF), sodium chloride (PF)         Data:      All new lab and imaging data was reviewed.   Recent Labs   Lab Test 06/12/20  0557 06/11/20  2226 06/11/20  1403  06/10/20  0206 05/26/20  1215 05/14/20  2322   WBC  --   --   --   --  5.9 2.8* 7.0   HGB 8.4* 8.5* 8.4*   < > 9.6* 8.7* 9.5*   MCV  --   --   --   --  89 92 88   PLT  --   --   --   --  161 78* 205   INR  --   --   --   --  1.22* 1.30* 1.23*    < > = values in this interval not displayed.      Recent Labs   Lab Test 06/12/20  0557 06/11/20  0602 06/10/20  0206   * 136 140   POTASSIUM 3.8 4.0 4.1   CHLORIDE 102 104 106   CO2 24 26 23   BUN 29 31* 34*   CR 1.89* 1.75* 1.72*   ANIONGAP 6 6 11   KEV 7.8* 7.6* 8.0*   * 98 85     Recent Labs   Lab Test 06/10/20  0206 04/26/18  1940   TROPI 0.023 <0.015

## 2020-06-12 NOTE — PLAN OF CARE
DATE & TIME: 6/12/2020 0771-5618  Cognitive Concerns/ Orientation : A&O x4, c/o not being able to sleep though slept majority of shift. Refusing activity.   BEHAVIOR & AGGRESSION TOOL COLOR: Green  CIWA SCORE: 2/0 (tremors sweating).  ABNL VS/O2: VSS, on RA.   MOBILITY: SBA walker/gb  PAIN MANAGMENT: Denies  DIET: Regular diet, strict I+O's.  BOWEL/BLADDER: Continent. No BM this shift.  ABNL LAB/BG: Q8 Hgb checks 8.4. Creat 1.89 from 1.75. Na+ 132.  DRAIN/DEVICES: R PIV SL  TELEMETRY RHYTHM: NA  SKIN: WDL  TESTS/PROCEDURES: Renal US completed.  D/C DAY/GOALS/PLACE: Discharge date/time/disposition unknown - per CD note says stay of commitment was revoked and  is working on placement into an IRTS.   OTHER IMPORTANT INFO: Albumin x1 dose. Nephrology consulted and following. GI, psych, chem dep, and SW following. Nursing will continue to monitor.  MD/RN ROUNDING SIGNED OFF D/E SHIFT: N/A  COMMIT TO SIT DONE AND SIGNED OFF: Yes

## 2020-06-12 NOTE — CONSULTS
Consult Date:  06/12/2020      IDENTIFICATION:  This is a 65-year-old male admitted via the emergency room, dated 06/10/2020-06/11/2020 in the setting of alcohol intoxication and suicidal ideation.      REASON FOR CONSULTATION:  Evaluation and assessment with respect to elevated serum creatinine.      IMPRESSION:   1.  Baseline chronic kidney disease by data review.  Creatinine typically in the range of 1.4 mg/dL.  Estimated GFR 40-50 mL per minute, representing stage III chronic kidney disease.   2.  Concern for subacute kidney injury with progressive decline in renal function dating back to January of this year.   3.  Multiple urinalyses demonstrating some intermittent dipstick positive proteinuria, but typically bland.   4.  Acute kidney injury with admission creatinine of 1.72, which has increased in 48 hours subsequent to admission to 1.89.  Diagnostic evaluation needs to be undertaken including renal ultrasound, urinalysis, urine sodium and quantification of urine protein excretion.  The patient did have a paracentesis procedure on 06/10/2020 for a total of 4 liters removal.  In addition, he does use spironolactone and furosemide, as outpatient medications potentially contributing.   5.  Absence of overt electrolyte or acid base abnormalities.   6.  Anemia.   7.  Cirrhosis with portal hypertension, esophageal varices and requirement for repeated paracentesis.   8.  Ongoing alcohol abuse dependence.   9.  History of benign prostatic hypertrophy.      RECOMMENDATIONS:   1.  Renal ultrasound to rule out potential obstructive causes in this clinical setting.   2.  Urinalysis.   3.  Quantification of urine protein excretion.   4.  Document urine sodium.   5.  No further loop diuretic.  Would manage his volume status with repeated paracentesis given his apparent rising creatinine over the past several months.   6.  Reinstitute spironolactone when renal function normalizes.   7.  Total avoidance of NSAIDs.   8.   No-added-salt diet.   9.  Modest fluid restriction appropriate.   10.  Needs to discontinue alcohol.      HISTORY OF PRESENT ILLNESS:  A 65-year-old male admitted through the emergency room with alcohol intoxication and suicidal ideation.  He has had multiple recent admissions to this facility by review.  Most recently discharged on 05/04/2020.  No previous renal evaluation.      He presented with a creatinine of 1.72 post-paracentesis.  He was intoxicated with an alcohol level 0.33.      His clinical course has been relatively stable.  His creatinine has increased.  INR incomplete.      There had been some concern with respect to GI bleed, possibly secondary to an upper source.  Gastroenterology has been involved.      Denies headache or chest pain at this time.  No history of lower extremity edema.  No recent alterations or changes in his urinary tract habits by report.      ADMISSION DIAGNOSES:     1.  Alcoholic liver disease and cirrhosis with portal hypertension.   2.  Anxiety.   3.  Coronary artery disease.   4.  Depression.   5.  History of myocardial infarction.   6.  Hypertension.   7.  Sleep apnea.   8.  History of subdural hematoma.   9.  Chronic obstructive pulmonary disease.      ADMISSION MEDICATIONS:     1.  Inhaler.     2.  Furosemide.     3.  Remeron.     4.  Vitamins.     5.  Protonix.     6.  Seroquel.     7.  Aldactone.     8.  Flomax.     9.  Trazodone.     10.  Trintellix.      SOCIAL HISTORY:  Ongoing tobacco, alcohol.  Not currently .      FAMILY HISTORY:  No renal disease of note.      REVIEW OF SYSTEMS:  Complete 10-point review of systems undertaken.  Pertinent positives and negatives are as I noted above.      PHYSICAL EXAMINATION:   VITAL SIGNS:  Afebrile, rhythm sinus, rate 70, blood pressure in the range of 120-140, sats are adequate on room air 94%.  I's and O's are incomplete as we noted.   GENERAL:  Somewhat older than stated age, but pleasant.   HEENT:  Normocephalic,  atraumatic.  Pharynx without lesion.   NECK:  Supple.   CHEST:  Symmetric and clear.   CARDIOVASCULAR:  Regular.   ABDOMEN:  Soft.   EXTREMITIES:  No edema.   NEUROLOGIC:  Nonfocal.   PSYCHIATRIC:  Pleasant.      LABORATORY DATA:  Current and historic reviewed in detail.         ANIRUDH LITTLE MD             D: 2020   T: 2020   MT: BREANNE      Name:     DEVONTE SEGURA   MRN:      [id number]        Account:       [id number]   :      1955           Consult Date:  2020      Document: [id number]

## 2020-06-12 NOTE — PLAN OF CARE
DATE & TIME: 2300-0730  Cognitive Concerns/ Orientation : A&O x4  BEHAVIOR & AGGRESSION TOOL COLOR: Green  CIWA SCORE: 8/7/2 (tremors, anxiety, headache) given 10mg Valium PO. Given Seroquel for sleep and anxiety/agitation  ABNL VS/O2: VSS, on RA.   MOBILITY: SBA walker/gb  PAIN MANAGMENT: Denies  DIET: Regular diet.  BOWEL/BLADDER: Continent. No BM this shift.  ABNL LAB/BG: Q8 Hgb checks 8.5 and 8.4. Creat 1.75. Albumin 2.7.  DRAIN/DEVICES: R PIV SL, discontinued IVF.  TELEMETRY RHYTHM: NA  SKIN: WDL  TESTS/PROCEDURES: EGD completed yesterday, unremarkable.  D/C DAY/GOALS/PLACE: Discharge date/time/disposition unknown - per CD note says stay of commitment was revoked and  is working on placement into an IRTS.   OTHER IMPORTANT INFO: GI, psych, chem dep, and SW following.

## 2020-06-13 LAB
ANION GAP SERPL CALCULATED.3IONS-SCNC: 5 MMOL/L (ref 3–14)
BUN SERPL-MCNC: 28 MG/DL (ref 7–30)
CALCIUM SERPL-MCNC: 8.1 MG/DL (ref 8.5–10.1)
CHLORIDE SERPL-SCNC: 104 MMOL/L (ref 94–109)
CO2 SERPL-SCNC: 24 MMOL/L (ref 20–32)
CREAT SERPL-MCNC: 1.79 MG/DL (ref 0.66–1.25)
FERRITIN SERPL-MCNC: 72 NG/ML (ref 26–388)
GFR SERPL CREATININE-BSD FRML MDRD: 39 ML/MIN/{1.73_M2}
GLUCOSE SERPL-MCNC: 120 MG/DL (ref 70–99)
HGB BLD-MCNC: 7.9 G/DL (ref 13.3–17.7)
IRON SATN MFR SERPL: 12 % (ref 15–46)
IRON SERPL-MCNC: 16 UG/DL (ref 35–180)
POTASSIUM SERPL-SCNC: 3.8 MMOL/L (ref 3.4–5.3)
PTH-INTACT SERPL-MCNC: 34 PG/ML (ref 12–64)
SODIUM SERPL-SCNC: 133 MMOL/L (ref 133–144)
TIBC SERPL-MCNC: 138 UG/DL (ref 240–430)

## 2020-06-13 PROCEDURE — 83550 IRON BINDING TEST: CPT | Performed by: HOSPITALIST

## 2020-06-13 PROCEDURE — P9047 ALBUMIN (HUMAN), 25%, 50ML: HCPCS | Performed by: INTERNAL MEDICINE

## 2020-06-13 PROCEDURE — 85018 HEMOGLOBIN: CPT | Performed by: HOSPITALIST

## 2020-06-13 PROCEDURE — 83540 ASSAY OF IRON: CPT | Performed by: HOSPITALIST

## 2020-06-13 PROCEDURE — 99232 SBSQ HOSP IP/OBS MODERATE 35: CPT | Performed by: HOSPITALIST

## 2020-06-13 PROCEDURE — 25000128 H RX IP 250 OP 636: Performed by: INTERNAL MEDICINE

## 2020-06-13 PROCEDURE — 83970 ASSAY OF PARATHORMONE: CPT | Performed by: HOSPITALIST

## 2020-06-13 PROCEDURE — 80048 BASIC METABOLIC PNL TOTAL CA: CPT | Performed by: HOSPITALIST

## 2020-06-13 PROCEDURE — 82306 VITAMIN D 25 HYDROXY: CPT | Performed by: HOSPITALIST

## 2020-06-13 PROCEDURE — 25800030 ZZH RX IP 258 OP 636: Performed by: INTERNAL MEDICINE

## 2020-06-13 PROCEDURE — 25000132 ZZH RX MED GY IP 250 OP 250 PS 637: Mod: GY | Performed by: HOSPITALIST

## 2020-06-13 PROCEDURE — 12000000 ZZH R&B MED SURG/OB

## 2020-06-13 PROCEDURE — 36415 COLL VENOUS BLD VENIPUNCTURE: CPT | Performed by: HOSPITALIST

## 2020-06-13 PROCEDURE — 82728 ASSAY OF FERRITIN: CPT | Performed by: HOSPITALIST

## 2020-06-13 RX ORDER — ALBUMIN (HUMAN) 12.5 G/50ML
25 SOLUTION INTRAVENOUS EVERY 6 HOURS
Status: COMPLETED | OUTPATIENT
Start: 2020-06-13 | End: 2020-06-14

## 2020-06-13 RX ADMIN — FOLIC ACID 1 MG: 1 TABLET ORAL at 07:34

## 2020-06-13 RX ADMIN — Medication 100 MG: at 07:34

## 2020-06-13 RX ADMIN — FLUTICASONE FUROATE AND VILANTEROL TRIFENATATE 1 PUFF: 200; 25 POWDER RESPIRATORY (INHALATION) at 07:36

## 2020-06-13 RX ADMIN — ALBUMIN HUMAN 25 G: 0.25 SOLUTION INTRAVENOUS at 01:11

## 2020-06-13 RX ADMIN — MULTIPLE VITAMINS W/ MINERALS TAB 1 TABLET: TAB at 07:34

## 2020-06-13 RX ADMIN — IRON SUCROSE 200 MG: 20 INJECTION, SOLUTION INTRAVENOUS at 13:37

## 2020-06-13 RX ADMIN — TAMSULOSIN HYDROCHLORIDE 0.4 MG: 0.4 CAPSULE ORAL at 20:25

## 2020-06-13 RX ADMIN — MIRTAZAPINE 15 MG: 15 TABLET, FILM COATED ORAL at 21:24

## 2020-06-13 RX ADMIN — PANTOPRAZOLE SODIUM 40 MG: 40 TABLET, DELAYED RELEASE ORAL at 06:53

## 2020-06-13 RX ADMIN — CLONIDINE HYDROCHLORIDE 0.1 MG: 0.1 TABLET ORAL at 07:35

## 2020-06-13 RX ADMIN — ALBUMIN HUMAN 25 G: 0.25 SOLUTION INTRAVENOUS at 12:38

## 2020-06-13 RX ADMIN — TRAZODONE HYDROCHLORIDE 100 MG: 50 TABLET ORAL at 21:24

## 2020-06-13 RX ADMIN — QUETIAPINE FUMARATE 50 MG: 50 TABLET ORAL at 21:24

## 2020-06-13 RX ADMIN — VORTIOXETINE 10 MG: 10 TABLET, FILM COATED ORAL at 07:34

## 2020-06-13 RX ADMIN — PANTOPRAZOLE SODIUM 40 MG: 40 TABLET, DELAYED RELEASE ORAL at 15:48

## 2020-06-13 RX ADMIN — ALBUMIN HUMAN 25 G: 0.25 SOLUTION INTRAVENOUS at 18:12

## 2020-06-13 RX ADMIN — CLONIDINE HYDROCHLORIDE 0.1 MG: 0.1 TABLET ORAL at 20:24

## 2020-06-13 ASSESSMENT — ACTIVITIES OF DAILY LIVING (ADL)
ADLS_ACUITY_SCORE: 14
ADLS_ACUITY_SCORE: 13
ADLS_ACUITY_SCORE: 13
ADLS_ACUITY_SCORE: 14
ADLS_ACUITY_SCORE: 13
ADLS_ACUITY_SCORE: 14

## 2020-06-13 NOTE — PROGRESS NOTES
Renal Medicine Progress Note                                Jim Richard MRN# [id number]   Age: 65 year old YOB: 1955   Date of Admission: 6/10/2020 Hospital LOS: 3                  Assessment/Plan:     65-year-old male admitted via the emergency room, dated 06/10/2020-06/11/2020 in the setting of alcohol intoxication and suicidal ideation    Evaluated with respect to elevated serum creatinine      1.  Baseline CKD   -creatinine 1.4 mg/dl   -eGFR 40 to 50 ml/min   -US reviewed  2.  Proteinuria   -modest increased ACR  3.  ARF   -subacute component over preceding months   -recent further increase    -Lona < 5; pre renal v HRS    -continue 25% albumin  4.  Anemia   -Fe deficient  5.  Alcoholic cirrhosis with portal HTN  6.  BPH and ? Chronic MARTINEZ      Repeat IV albumin  No diuretic  IV Fe  ? urology evaluation         Interval History:     IO and UO reviewed  Labs reviewed    Modest improvement in creatinine with colloid    Fatigued  No CP or SOB      ROS:     GENERAL: NAD, No fever,chills  R: NEGATIVE for significant cough or SOB  CV: NEGATIVE for chest pain, palpitations  EXT: no change edema  ROS otherwise negative    Medications and Allergies:     Reviewed    Physical Exam:     Vitals were reviewed  Patient Vitals for the past 12 hrs:   BP Temp Temp src Pulse Heart Rate Resp SpO2   06/13/20 0724 129/71 98.7  F (37.1  C) Oral -- 73 20 94 %   06/13/20 0405 138/81 98.6  F (37  C) Oral -- 76 16 95 %   06/13/20 0100 110/61 99  F (37.2  C) Oral 57 -- 16 95 %     I/O last 3 completed shifts:  In: 360 [P.O.:360]  Out: 800 [Urine:800]    Vitals:    06/10/20 0158   Weight: 86.2 kg (190 lb)         GENERAL: awake, alert, follows  HEENT: NC/AT, PERRLA, EOMI, non icteric, pharynx moist without lesion  NECK: supple, no masses or adenopathy  RESP:  clear anteriorly  CV: RRR, normal S1 S2  ABDOMEN: soft, nontender, no HSM or masses and bowel sounds normal  MS: no clubbing, cyanosis   SKIN: clear without  significant rashes or lesions  NEURO: speech normal and cranial nerves 2-12 intact  PSYCH: affect normal/bright  EXT: warm, no edema    Data:     Recent Labs   Lab 06/13/20  0838 06/12/20  0557 06/11/20  0602 06/10/20  0206    132* 136 140   POTASSIUM 3.8 3.8 4.0 4.1   CHLORIDE 104 102 104 106   CO2 24 24 26 23   ANIONGAP 5 6 6 11   * 125* 98 85   BUN 28 29 31* 34*   CR 1.79* 1.89* 1.75* 1.72*   GFRESTIMATED 39* 36* 40* 40*   GFRESTBLACK 45* 42* 46* 47*   KEV 8.1* 7.8* 7.6* 8.0*         Recent Labs   Lab Test 06/13/20  0838 06/12/20  0557 06/11/20  0602 06/10/20  0206 05/26/20  1215 05/14/20  2322 05/03/20  0738 05/01/20  1525 04/03/20  0636 04/01/20  0724   CR 1.79* 1.89* 1.75* 1.72* 1.43* 1.15 1.47* 1.49* 1.39* 1.41*     Recent Labs   Lab 06/10/20  0206   ALBUMIN 2.7*     Recent Labs   Lab 06/13/20  0838 06/12/20  0557 06/11/20  2226 06/11/20  1403   HGB 7.9* 8.4* 8.5* 8.4*     No lab results found in last 7 days.  Recent Labs   Lab 06/12/20  1130   UNAR <5     Recent Labs   Lab 06/12/20  1130   UMALCR 30.16*         G Brody Ayala MD    [facility] - Nephrology  [phone]

## 2020-06-13 NOTE — PROGRESS NOTES
[facility]  Gastroenterology Progress Note     Jim Richard MRN# [id number]   YOB: 1955 Age: 65 year old          Assessment and Plan:     GI bleed  Patient is clinically stable patient is doing well from GI standpoint patient hemoglobin is stable  We will continue to follow along.            Alcoholic intoxication without complication (H)  Suicidal ideation  Hematemesis, presence of nausea not specified  Renal insufficiency      Interval History:   doing well; no cp, sob, n/v/d, or abd pain.              Review of Systems:   C: NEGATIVE for fever, chills, change in weight  E/M: NEGATIVE for ear, mouth and throat problems  R: NEGATIVE for significant cough or SOB  CV: NEGATIVE for chest pain, palpitations or peripheral edema             Medications:   I have reviewed this patient's current medications    albumin human  25 g Intravenous Q6H     cloNIDine  0.1 mg Oral BID     fluticasone-vilanterol  1 puff Inhalation Daily     folic acid  1 mg Oral Daily     iron sucrose (VENOFER) intermittent infusion  200 mg Intravenous Daily     mirtazapine  15 mg Oral At Bedtime     multivitamin w/minerals  1 tablet Oral Daily     pantoprazole  40 mg Oral BID AC     sodium chloride (PF)  3 mL Intracatheter Q8H     tamsulosin  0.4 mg Oral QPM     traZODone  100 mg Oral At Bedtime     vitamin B1  100 mg Oral Daily     vortioxetine  10 mg Oral Daily                  Physical Exam:   Vitals were reviewed  Vital Signs with Ranges  Temp:  [98.4  F (36.9  C)-99  F (37.2  C)] 98.7  F (37.1  C)  Pulse:  [57-61] 57  Heart Rate:  [60-76] 60  Resp:  [16-20] 20  BP: (110-138)/(61-81) 112/65  SpO2:  [94 %-95 %] 94 %  I/O last 3 completed shifts:  In: 360 [P.O.:360]  Out: 800 [Urine:800]  Constitutional: healthy, alert and no distress   Cardiovascular: negative, PMI normal. No lifts, heaves, or thrills. RRR. No murmurs, clicks gallops or rub  Respiratory: negative, Percussion normal. Good diaphragmatic  excursion. Lungs clear  Neck: Neck supple. No adenopathy. Thyroid symmetric, normal size,, Carotids without bruits.  Abdomen: Abdomen soft, non-tender. BS normal. No masses, organomegaly  SKIN: no suspicious lesions or rashes           Data:   I reviewed the patient's new clinical lab test results.   Recent Labs   Lab Test 06/13/20  0838 06/12/20  0557 06/11/20  2226  06/10/20  0206 05/26/20  1215 05/14/20  2322   WBC  --   --   --   --  5.9 2.8* 7.0   HGB 7.9* 8.4* 8.5*   < > 9.6* 8.7* 9.5*   MCV  --   --   --   --  89 92 88   PLT  --   --   --   --  161 78* 205   INR  --   --   --   --  1.22* 1.30* 1.23*    < > = values in this interval not displayed.     Recent Labs   Lab Test 06/13/20 0838 06/12/20  0557 06/11/20  0602   POTASSIUM 3.8 3.8 4.0   CHLORIDE 104 102 104   CO2 24 24 26   BUN 28 29 31*   ANIONGAP 5 6 6     Recent Labs   Lab Test 06/10/20  0206 05/26/20  1612 05/26/20  1215 05/14/20  2322  02/03/20  0218  01/11/20  1730  11/13/19  1858 11/13/19  1849   ALBUMIN 2.7*  --  2.9* 2.8*   < > 2.7*   < >  --    < > 3.0*  --    BILITOTAL 1.0  --  1.7* 0.7   < > 1.6*   < >  --    < > 1.0  --    ALT 30  --  29 26   < > 26   < >  --    < > 24  --    AST 89*  --  65* 45   < > 61*   < >  --    < > 58*  --    PROTEIN  --  30*  --   --   --   --   --  Negative  --   --  30*   LIPASE  --   --  346  --   --  369  --   --   --  499*  --     < > = values in this interval not displayed.       I reviewed the patient's new imaging results.    All laboratory data reviewed  All imaging studies reviewed by me.    Rosendo Shaw MD,  6/13/2020  [facility]  Office : [phone]  Cell: [phone]

## 2020-06-13 NOTE — PLAN OF CARE
DATE & TIME: 6/13/20   Cognitive Concerns/ Orientation : A&O x4,    BEHAVIOR & AGGRESSION TOOL COLOR: Green  CIWA SCORE: 3 for tremor and reported anxiety.  ABNL VS/O2: VSS, on RA.   MOBILITY: Assist of 1 GB + W; ambulated to BR, in muniz x1.   PAIN MANAGMENT: C/O SOB, repos so abd was not causing restriction, effective.   DIET: Regular diet, strict I+O's.  BOWEL/BLADDER: Continent.(urinal at the bedside) BM x 1.  ABNL LAB/BG: Hgb 7.9; low iron; creat 1.79  DRAIN/DEVICES: RFA PIV SL  TELEMETRY RHYTHM: NA  SKIN: Scattered bruising   TESTS/PROCEDURES:   D/C DAY/GOALS/PLACE: awaiting intensive residential treatment, stable Hgb and renal function.  OTHER IMPORTANT INFO: Albumin infusion given; followed by Renal, GI. Resting between cares.

## 2020-06-13 NOTE — PLAN OF CARE
DATE & TIME: 6/12/2020 4056-3292  Cognitive Concerns/ Orientation : A&O x4, slept most of evening shift. Stated he was tired and had very little energy.   BEHAVIOR & AGGRESSION TOOL COLOR: Green  CIWA SCORE: 1 (minimal tremor), 1 (minimal tremor)  ABNL VS/O2: VSS, on RA. Remains afebrile.   MOBILITY: Assist of 1 GB + W  PAIN MANAGMENT: Complained of back discomfort, did not want any medication; ice applied and provided relief.   DIET: Regular diet, strict I+O's.  BOWEL/BLADDER: Continent. No BM this shift.  ABNL LAB/BG:Last Hgb check 8.4, redraw scheduled for morning. Creat 1.89 from 1.75. Na+ 132.  DRAIN/DEVICES: R PIV SL  TELEMETRY RHYTHM: NA  SKIN: Scattered bruising   TESTS/PROCEDURES: Renal US completed.  D/C DAY/GOALS/PLACE: Discharge date/time/disposition unknown - per CD note says stay of commitment was revoked and  is working on placement into an IRTS.   OTHER IMPORTANT INFO: Albumin x1 dose. Diminished lung sounds. Ambulated around unit this afternoon. Nephrology consulted and following. GI, psych, chem dep, and SW following. Nursing will continue to monitor.  MD/RN ROUNDING SIGNED OFF D/E SHIFT: N/A  COMMIT TO SIT DONE AND SIGNED OFF: Yes  Teresa Stroud RN

## 2020-06-13 NOTE — PLAN OF CARE
DATE & TIME: 6/13/20 0545-8623  Cognitive Concerns/ Orientation : A&O x4,    BEHAVIOR & AGGRESSION TOOL COLOR: Green  CIWA SCORE: 3,3 for tremor  ABNL VS/O2: VSS, on RA.   MOBILITY: Assist of 1 GB + W; ambulated muniz x 2  PAIN MANAGMENT: Complained of back discomfort, did not want any medication; ice applied and provided relief.   DIET: Regular diet, strict I+O's.  BOWEL/BLADDER: Continent.(urinal at the bedside) BM x 2  ABNL LAB/BG: Hgb 7.9; low iron; creat 1.79  DRAIN/DEVICES: RFA PIV SL  TELEMETRY RHYTHM: NA  SKIN: Scattered bruising   TESTS/PROCEDURES:   D/C DAY/GOALS/PLACE: awaiting intensive residential treatment  OTHER IMPORTANT INFO: albumin and iron infusions; followed by Renal, GI; abdomen firm distended, denies discomfort of abdomen.

## 2020-06-13 NOTE — PROGRESS NOTES
[facility]  Hospitalist Progress Note        Jace Koenig MD   06/13/2020        Interval History:      - no acute issues overnight, denies any active complaints apart from feeling weak; got IV albumin doses per nephrology         Assessment and Plan:        Jim Richard is a 65 year old male with PMH significant for alcohol abuse, alcoholic cirrhosis with ascites, JANIS, depression and anxiety, history of G.I. bleed with esophageal varices who presented to ER with alcohol intoxication and suicidal ideation. While in ED he also reported some dark stools and is admitted for further evaluation of G.I. bleed     #Likely G.I. bleed with history of esophagitis and esophageal varices  #chronic anemia and thrombocytopenia  - hemodynamically stable with no further melena  - baseline hemoglobin 8-9; hb 9.6---9.0----8.3---stabilizing around 8-8.5  - evaluated by GI, EGD 6/11 noted again with grade 1 esophageal varices, moderate portal gastropathy  - continue Protonix PO bid  - on Rocephin IV for SBP prophylaxis, discontinued 6/13  - does have s/o iron deficiency; getting IV iron per nephrology    #Acute on CKD  - baseline creatinine around 1.3-1.5  - creatinine on admission 1.7, likely prerenal secondary to alcohol abuse and poor PO intake; held off on PTA Lasix, Aldactone and started on cautious IV hydration but renal function worsening; IVF d/abhijeet on 6/11 evening  - Cr 1.72---1.75---1.89---1.79  - consulted nephrology, suggested no more diuretics (might need frequent paracentesis for ascites); - renal US (6/12) UR except for some evidence of chronic urinary bladder outlet obstruction; getting Iv albumin per  Nephrology  - monitor BMP     #Alcohol abuse/dependence  #alcoholic cirrhosis with ascites  #medication noncompliance  #Anxiety and depression  -had stopped taking all his medications recently PTA  -had ultrasound-guided paracentesis done in ED 5/10; no 4 L removed  -continue to hold off on  "PTA Lasix and Aldactone for now given worsening renal function   -blood alcohol level on admission 0.32,   - continue Valium per [facility] protocol, no significant withdrawal  - evaluated by psych psychiatry, suggested to resume PTA antidepressants which he has been non-compliant with; continue PTA Remeron, Seroquel, trazodone, Trintellix  - apparently his commitment is been removed,  for disposition planning-- working on [facility]ensive [facility]idential [facility]  - continue clonidine for tremors  - denies any any suicidal ideations     #BPH: continue Flomax     #COPD  -respiratory status stable continue; continue PTA inhalers     # DVT prophylaxis: mechanical with PCD boots  # Code status: full code    Disposition: unclear, pending placement, improvement in renal function; SW for disposition                     Physical Exam:      Blood pressure 129/71, pulse 57, temperature 98.7  F (37.1  C), temperature source Oral, resp. rate 20, height 1.702 m (5' 7\"), weight 86.2 kg (190 lb), SpO2 94 %.  Vitals:    06/10/20 0158   Weight: 86.2 kg (190 lb)     Vital Signs with Ranges  Temp:  [98.4  F (36.9  C)-99  F (37.2  C)] 98.7  F (37.1  C)  Pulse:  [57-61] 57  Heart Rate:  [73-76] 73  Resp:  [16-20] 20  BP: (110-138)/(61-81) 129/71  SpO2:  [94 %-95 %] 94 %  I/O's Last 24 hours  I/O last 3 completed shifts:  In: 360 [P.O.:360]  Out: 800 [Urine:800]    Constitutional: Alert, awake and orienetd X 3; lying comfortably in bed in no apparent distress   HEENT: Pupils equal and reactive to light and accomodation, EOMI intact; neck supple no raised JVD or rigidity    Oral cavity: Moist oral mucosa   Cardiovascular: Normal s1 s2, regular rate and rhythm, no murmur   Lungs: B/l clear to auscultation, no wheezes or crepitations   Abdomen: Soft, distended, ascites +, nd, no guarding, rigidity or rebound; BS +   LE : Mild b/l edema +   Musculoskeletal: Power 5/5 in all extremities; less shaky /tremulous    Neuro: No focal " neurological deficits noted, CN II to XII grossly intact   Psychiatry: normal mood and affect  Skin: No obvious skin rashes or ulcers                  Medications:          cefTRIAXone  1 g Intravenous Q24H     cloNIDine  0.1 mg Oral BID     fluticasone-vilanterol  1 puff Inhalation Daily     folic acid  1 mg Oral Daily     mirtazapine  15 mg Oral At Bedtime     multivitamin w/minerals  1 tablet Oral Daily     pantoprazole  40 mg Oral BID AC     sodium chloride (PF)  3 mL Intracatheter Q8H     tamsulosin  0.4 mg Oral QPM     traZODone  100 mg Oral At Bedtime     vitamin B1  100 mg Oral Daily     vortioxetine  10 mg Oral Daily     PRN Meds: diazepam **OR** diazepam, lidocaine 4%, lidocaine (buffered or not buffered), - MEDICATION INSTRUCTIONS -, melatonin, naloxone, ondansetron **OR** ondansetron, prochlorperazine **OR** prochlorperazine **OR** prochlorperazine, QUEtiapine, QUEtiapine, sodium chloride (PF), sodium chloride (PF)         Data:      All new lab and imaging data was reviewed.   Recent Labs   Lab Test 06/13/20  0838 06/12/20  0557 06/11/20  2226  06/10/20  0206 05/26/20  1215 05/14/20  2322   WBC  --   --   --   --  5.9 2.8* 7.0   HGB 7.9* 8.4* 8.5*   < > 9.6* 8.7* 9.5*   MCV  --   --   --   --  89 92 88   PLT  --   --   --   --  161 78* 205   INR  --   --   --   --  1.22* 1.30* 1.23*    < > = values in this interval not displayed.      Recent Labs   Lab Test 06/12/20  0557 06/11/20  0602 06/10/20  0206   * 136 140   POTASSIUM 3.8 4.0 4.1   CHLORIDE 102 104 106   CO2 24 26 23   BUN 29 31* 34*   CR 1.89* 1.75* 1.72*   ANIONGAP 6 6 11   KEV 7.8* 7.6* 8.0*   * 98 85     Recent Labs   Lab Test 06/10/20  0206 04/26/18  1940   TROPI 0.023 <0.015

## 2020-06-13 NOTE — PLAN OF CARE
DATE & TIME: 6/12/2020 1611-0453  Cognitive Concerns/ Orientation : A&O x4,    BEHAVIOR & AGGRESSION TOOL COLOR: Green  CIWA SCORE: 1 (minimal tremor), 3 (minimal tremor) and sweat  ABNL VS/O2: VSS, on RA.   MOBILITY: Assist of 1 GB + W  PAIN MANAGMENT: Complained of back discomfort, did not want any medication; ice applied and provided relief.   DIET: Regular diet, strict I+O's.  BOWEL/BLADDER: Continent.(urinal at the bedside) No BM this shift.  ABNL LAB/BG:Last Hgb check 8.4, redraw scheduled for morning. Creat 1.89 from 1.75. Na+ 132.  DRAIN/DEVICES: R PIV SL  TELEMETRY RHYTHM: NA  SKIN: Scattered bruising   TESTS/PROCEDURES: Renal US completed.  D/C DAY/GOALS/PLACE: Discharge date/time/disposition unknown - per CD note says stay of commitment was revoked and  is working on placement into an IRTS.   OTHER IMPORTANT INFO: Albumin x1 dose. Diminished lung sounds. Nephrology consulted and following. GI, psych, chem dep, and SW following. Nursing will continue to monitor.  MD/RN ROUNDING SIGNED OFF D/E SHIFT: N/A  COMMIT TO SIT DONE AND SIGNED OFF: Yes

## 2020-06-14 ENCOUNTER — APPOINTMENT (OUTPATIENT)
Dept: PHYSICAL THERAPY | Facility: CLINIC | Age: 65
DRG: 432 | End: 2020-06-14
Attending: HOSPITALIST
Payer: MEDICARE

## 2020-06-14 LAB
ANION GAP SERPL CALCULATED.3IONS-SCNC: 8 MMOL/L (ref 3–14)
BUN SERPL-MCNC: 26 MG/DL (ref 7–30)
CALCIUM SERPL-MCNC: 8.1 MG/DL (ref 8.5–10.1)
CHLORIDE SERPL-SCNC: 106 MMOL/L (ref 94–109)
CO2 SERPL-SCNC: 22 MMOL/L (ref 20–32)
CREAT SERPL-MCNC: 1.66 MG/DL (ref 0.66–1.25)
GFR SERPL CREATININE-BSD FRML MDRD: 42 ML/MIN/{1.73_M2}
GLUCOSE SERPL-MCNC: 126 MG/DL (ref 70–99)
HGB BLD-MCNC: 8.1 G/DL (ref 13.3–17.7)
POTASSIUM SERPL-SCNC: 3.7 MMOL/L (ref 3.4–5.3)
SODIUM SERPL-SCNC: 136 MMOL/L (ref 133–144)
SODIUM UR-SCNC: 19 MMOL/L

## 2020-06-14 PROCEDURE — 97161 PT EVAL LOW COMPLEX 20 MIN: CPT | Mod: GP

## 2020-06-14 PROCEDURE — 97116 GAIT TRAINING THERAPY: CPT | Mod: GP

## 2020-06-14 PROCEDURE — 84300 ASSAY OF URINE SODIUM: CPT | Performed by: INTERNAL MEDICINE

## 2020-06-14 PROCEDURE — P9047 ALBUMIN (HUMAN), 25%, 50ML: HCPCS | Performed by: INTERNAL MEDICINE

## 2020-06-14 PROCEDURE — 25000128 H RX IP 250 OP 636: Performed by: HOSPITALIST

## 2020-06-14 PROCEDURE — 80048 BASIC METABOLIC PNL TOTAL CA: CPT | Performed by: HOSPITALIST

## 2020-06-14 PROCEDURE — 36415 COLL VENOUS BLD VENIPUNCTURE: CPT | Performed by: HOSPITALIST

## 2020-06-14 PROCEDURE — 25000132 ZZH RX MED GY IP 250 OP 250 PS 637: Mod: GY | Performed by: HOSPITALIST

## 2020-06-14 PROCEDURE — 97530 THERAPEUTIC ACTIVITIES: CPT | Mod: GP

## 2020-06-14 PROCEDURE — 99232 SBSQ HOSP IP/OBS MODERATE 35: CPT | Performed by: HOSPITALIST

## 2020-06-14 PROCEDURE — 85018 HEMOGLOBIN: CPT | Performed by: HOSPITALIST

## 2020-06-14 PROCEDURE — 25000128 H RX IP 250 OP 636: Performed by: INTERNAL MEDICINE

## 2020-06-14 PROCEDURE — 97110 THERAPEUTIC EXERCISES: CPT | Mod: GP

## 2020-06-14 PROCEDURE — 25800030 ZZH RX IP 258 OP 636: Performed by: INTERNAL MEDICINE

## 2020-06-14 PROCEDURE — 12000000 ZZH R&B MED SURG/OB

## 2020-06-14 RX ADMIN — ONDANSETRON 4 MG: 2 INJECTION INTRAMUSCULAR; INTRAVENOUS at 17:44

## 2020-06-14 RX ADMIN — CLONIDINE HYDROCHLORIDE 0.1 MG: 0.1 TABLET ORAL at 08:24

## 2020-06-14 RX ADMIN — QUETIAPINE FUMARATE 50 MG: 50 TABLET ORAL at 21:39

## 2020-06-14 RX ADMIN — TAMSULOSIN HYDROCHLORIDE 0.4 MG: 0.4 CAPSULE ORAL at 21:33

## 2020-06-14 RX ADMIN — FLUTICASONE FUROATE AND VILANTEROL TRIFENATATE 1 PUFF: 200; 25 POWDER RESPIRATORY (INHALATION) at 08:25

## 2020-06-14 RX ADMIN — ALBUMIN HUMAN 25 G: 0.25 SOLUTION INTRAVENOUS at 00:28

## 2020-06-14 RX ADMIN — VORTIOXETINE 10 MG: 10 TABLET, FILM COATED ORAL at 08:24

## 2020-06-14 RX ADMIN — TRAZODONE HYDROCHLORIDE 100 MG: 50 TABLET ORAL at 21:32

## 2020-06-14 RX ADMIN — MELATONIN 3 MG: 3 TAB ORAL at 23:46

## 2020-06-14 RX ADMIN — MULTIPLE VITAMINS W/ MINERALS TAB 1 TABLET: TAB at 08:24

## 2020-06-14 RX ADMIN — IRON SUCROSE 200 MG: 20 INJECTION, SOLUTION INTRAVENOUS at 08:25

## 2020-06-14 RX ADMIN — Medication 100 MG: at 08:24

## 2020-06-14 RX ADMIN — QUETIAPINE 25 MG: 25 TABLET ORAL at 16:18

## 2020-06-14 RX ADMIN — MIRTAZAPINE 15 MG: 15 TABLET, FILM COATED ORAL at 21:33

## 2020-06-14 RX ADMIN — FOLIC ACID 1 MG: 1 TABLET ORAL at 08:24

## 2020-06-14 RX ADMIN — PANTOPRAZOLE SODIUM 40 MG: 40 TABLET, DELAYED RELEASE ORAL at 16:09

## 2020-06-14 RX ADMIN — PANTOPRAZOLE SODIUM 40 MG: 40 TABLET, DELAYED RELEASE ORAL at 06:43

## 2020-06-14 ASSESSMENT — ACTIVITIES OF DAILY LIVING (ADL)
ADLS_ACUITY_SCORE: 14
ADLS_ACUITY_SCORE: 13
ADLS_ACUITY_SCORE: 14
ADLS_ACUITY_SCORE: 14
ADLS_ACUITY_SCORE: 13
ADLS_ACUITY_SCORE: 13

## 2020-06-14 NOTE — PROGRESS NOTES
[facility]  Hospitalist Progress Note        Jace Koenig MD   06/14/2020        Interval History:      - no acute issues overnight, getting IV iron; got IV albumin doses per nephrology         Assessment and Plan:        Jim Richard is a 65 year old male with PMH significant for alcohol abuse, alcoholic cirrhosis with ascites, JANIS, depression and anxiety, history of G.I. bleed with esophageal varices who presented to ER with alcohol intoxication and suicidal ideation. While in ED he also reported some dark stools and is admitted for further evaluation of G.I. bleed     #Likely G.I. bleed with history of esophagitis and esophageal varices  #chronic anemia and thrombocytopenia  - hemodynamically stable with no further melena  - baseline hemoglobin 8-9; hb 9.6---9.0----8.3---stabilizing around 8-8.5  - evaluated by GI, EGD 6/11 noted again with grade 1 esophageal varices, moderate portal gastropathy  - continue Protonix PO bid  - was on Rocephin IV for SBP prophylaxis, discontinued 6/13  - does have s/o iron deficiency; getting IV iron per nephrology    #Acute on CKD  - baseline creatinine around 1.3-1.5  - creatinine on admission 1.7, likely prerenal secondary to alcohol abuse and poor PO intake; held off on PTA Lasix, Aldactone and started on cautious IV hydration but renal function worsening; IVF d/abhijeet on 6/11 evening  - Cr 1.72---1.75---1.89---1.79---1.66, improving  - nephrology following, suggested no more diuretics (might need frequent paracentesis for ascites); - renal US (6/12) UR except for some evidence of chronic urinary bladder outlet obstruction; got IV albumin per  Nephrology  - monitor BMP     #Alcohol abuse/dependence  #alcoholic cirrhosis with ascites  #medication noncompliance  #Anxiety and depression  -had stopped taking all his medications recently PTA  -had ultrasound-guided paracentesis done in ED 5/10; no 4 L removed  -continue to hold off on PTA Lasix and  "Aldactone for now given worsening renal function   -blood alcohol level on admission 0.32,   - continue Valium per [facility] protocol, no significant withdrawal  - evaluated by psych psychiatry, suggested to resume PTA antidepressants which he has been non-compliant with; continue PTA Remeron, Seroquel, trazodone, Trintellix  - apparently his commitment is been revoked,  for disposition planning-- working on Intensive Residential Treatment [facility]  - tremors much improved--will discontinue clonidine     #BPH: continue Flomax     #COPD  -respiratory status stable continue; continue PTA inhalers     # DVT prophylaxis: mechanical with PCD boots  # Code status: full code    Disposition: unclear, pending placement, improvement in renal function; SW for disposition                     Physical Exam:      Blood pressure 119/69, pulse 56, temperature 98.8  F (37.1  C), temperature source Oral, resp. rate 16, height 1.702 m (5' 7\"), weight 86.2 kg (190 lb), SpO2 94 %.  Vitals:    06/10/20 0158   Weight: 86.2 kg (190 lb)     Vital Signs with Ranges  Temp:  [97.8  F (36.6  C)-98.9  F (37.2  C)] 98.8  F (37.1  C)  Pulse:  [56] 56  Heart Rate:  [57-63] 57  Resp:  [16-18] 16  BP: (112-129)/(65-74) 119/69  SpO2:  [94 %-96 %] 94 %  I/O's Last 24 hours  I/O last 3 completed shifts:  In: 940 [P.O.:740; I.V.:100]  Out: -     Constitutional: Alert, awake and orienetd X 3; lying comfortably in bed in no apparent distress   HEENT: Pupils equal and reactive to light and accomodation, EOMI intact; neck supple no raised JVD or rigidity    Oral cavity: Moist oral mucosa   Cardiovascular: Normal s1 s2, regular rate and rhythm, no murmur   Lungs: B/l clear to auscultation, no wheezes or crepitations   Abdomen: Soft, distended, ascites +, nd, no guarding, rigidity or rebound; BS +   LE : Mild b/l edema +   Musculoskeletal: Power 5/5 in all extremities; less shaky /tremulous    Neuro: No focal neurological deficits noted, CN II to XII " grossly intact   Psychiatry: normal mood and affect  Skin: No obvious skin rashes or ulcers                  Medications:          cloNIDine  0.1 mg Oral BID     fluticasone-vilanterol  1 puff Inhalation Daily     folic acid  1 mg Oral Daily     iron sucrose (VENOFER) intermittent infusion  200 mg Intravenous Daily     mirtazapine  15 mg Oral At Bedtime     multivitamin w/minerals  1 tablet Oral Daily     pantoprazole  40 mg Oral BID AC     sodium chloride (PF)  3 mL Intracatheter Q8H     tamsulosin  0.4 mg Oral QPM     traZODone  100 mg Oral At Bedtime     vortioxetine  10 mg Oral Daily     PRN Meds: diazepam **OR** diazepam, lidocaine 4%, lidocaine (buffered or not buffered), - MEDICATION INSTRUCTIONS -, melatonin, naloxone, ondansetron **OR** ondansetron, prochlorperazine **OR** prochlorperazine **OR** prochlorperazine, QUEtiapine, QUEtiapine, sodium chloride (PF), sodium chloride (PF)         Data:      All new lab and imaging data was reviewed.   Recent Labs   Lab Test 06/14/20 0745 06/13/20  0838 06/12/20  0557  06/10/20  0206 05/26/20  1215 05/14/20  2322   WBC  --   --   --   --  5.9 2.8* 7.0   HGB 8.1* 7.9* 8.4*   < > 9.6* 8.7* 9.5*   MCV  --   --   --   --  89 92 88   PLT  --   --   --   --  161 78* 205   INR  --   --   --   --  1.22* 1.30* 1.23*    < > = values in this interval not displayed.      Recent Labs   Lab Test 06/14/20 0745 06/13/20 0838 06/12/20  0557    133 132*   POTASSIUM 3.7 3.8 3.8   CHLORIDE 106 104 102   CO2 22 24 24   BUN 26 28 29   CR 1.66* 1.79* 1.89*   ANIONGAP 8 5 6   KEV 8.1* 8.1* 7.8*   * 120* 125*     Recent Labs   Lab Test 06/10/20  0206 04/26/18  1940   TROPI 0.023 <0.015

## 2020-06-14 NOTE — PROGRESS NOTES
06/14/20 0933   Quick Adds   Type of Visit Initial PT Evaluation   Living Environment   Lives With other (see comments)  (roommate)   Living Arrangements   (townhouse)   Home Accessibility stairs to enter home;stairs within home   Number of Stairs, Main Entrance   (12)   Stair Railings, Main Entrance railings on both sides of stairs   Transportation Anticipated family or friend will provide   Living Environment Comment Patient lives in a townhouse with a roommate and there are 12 steps to enter the place. Patient does his own household tasks.   Self-Care   Usual Activity Tolerance moderate   Current Activity Tolerance moderate   Equipment Currently Used at Home none  (owns a walker)   Activity/Exercise/Self-Care Comment At baseline patient is IND with functional mobility & ADLs. Reports could walk about a couple of blocks.    Functional Level Prior   Ambulation 0-->independent   Transferring 0-->independent   Fall history within last six months no   Which of the above functional risks had a recent onset or change? ambulation;transferring   General Information   Onset of Illness/Injury or Date of Surgery - Date 06/13/20   Referring Physician Jace Koenig MD   Patient/Family Goals Statement To improve balance   Pertinent History of Current Problem (include personal factors and/or comorbidities that impact the POC) 65 year old male with PMH significant for alcohol abuse, alcoholic cirrhosis with ascites, JANIS, depression and anxiety, history of G.I. bleed with esophageal varices who presented to ER with alcohol intoxication and suicidal ideation. While in ED he also reported some dark stools and is admitted for further evaluation of G.I. bleed   Precautions/Limitations fall precautions   General Observations Greeted patient supine in bed.   General Info Comments Activity: up ad eliane   Cognitive Status Examination   Orientation orientation to person, place and time   Level of Consciousness alert   Follows  "Commands and Answers Questions 100% of the time;able to follow multistep instructions   Pain Assessment   Patient Currently in Pain   (6/10 low back)   Integumentary/Edema   Integumentary/Edema Comments scabs on R wrist   Posture    Posture Forward head position;Protracted shoulders   Range of Motion (ROM)   ROM Comment B LE WFL   Strength   Strength Comments B LE functionally weak; scores 0 for 30 second chair stand test; able to perform 3 reps in 30 seconds with use of UE.   Bed Mobility   Bed Mobility Comments supine > EOB at SBA   Transfer Skills   Transfer Comments sit <> stand with FWW at CGA   Gait   Gait Comments 10ft with FWW at CGA; decreased cathy/stride, lack of heel strike   Balance   Balance Comments dynamic standing balance unsteady requiring UE support on FWW   Sensory Examination   Sensory Perception Comments baseline big toe on R side numb   General Therapy Interventions   Planned Therapy Interventions balance training;bed mobility training;gait training;neuromuscular re-education;strengthening;transfer training;home program guidelines;progressive activity/exercise   Clinical Impression   Criteria for Skilled Therapeutic Intervention yes, treatment indicated   PT Diagnosis impaired functional mobility   Influenced by the following impairments LE weakness, decreased activity tolerance, SOB with activity, fatigue   Functional limitations due to impairments bed mobility, transfers, ambulation, stairs   Clinical Presentation Stable/Uncomplicated   Clinical Presentation Rationale PMH, current presentation, clinical judgement   Clinical Decision Making (Complexity) Low complexity   Therapy Frequency Daily   Predicted Duration of Therapy Intervention (days/wks) 4 days   Anticipated Discharge Disposition Home with Home Therapy   Risk & Benefits of therapy have been explained Yes   Patient, Family & other staff in agreement with plan of care Yes   [facility] AM-PAC TM \"6 Clicks\"   2016, Trustees of " "[facility], under license to [employer].  All rights reserved.   6 Clicks Short Forms Basic Mobility Inpatient Short Form   [facility] AM-PAC  \"6 Clicks\" V.2 Basic Mobility Inpatient Short Form   1. Turning from your back to your side while in a flat bed without using bedrails? 3 - A Little   2. Moving from lying on your back to sitting on the side of a flat bed without using bedrails? 3 - A Little   3. Moving to and from a bed to a chair (including a wheelchair)? 3 - A Little   4. Standing up from a chair using your arms (e.g., wheelchair, or bedside chair)? 3 - A Little   5. To walk in hospital room? 3 - A Little   6. Climbing 3-5 steps with a railing? 3 - A Little   Basic Mobility Raw Score (Score out of 24.Lower scores equate to lower levels of function) 18   Total Evaluation Time   Total Evaluation Time (Minutes) 10     "

## 2020-06-14 NOTE — PLAN OF CARE
PT orders received & acknowledged. Chart reviewed. Eval completed & treatment initiated.     Patient lives in a townhouse with a roommate and there are 12 steps to enter the place. Patient does his own household tasks. At baseline patient is IND with functional mobility & ADLs. Reports could walk about a couple of blocks.    Discharge Planner PT   Patient plan for discharge: Unsure  Current status: Greeted patient supine in bed and agreeable to therapy. VSS on RA. Engaged patient in supine > EOB at SBA. Engaged patient in sit <> stand both with use of & without FWW at CGA-SBA. Engaged patient in ambulation with FWW at [facility]-SBA with cues for technique. Engaged patient in ascending/descending 10 steps with bilateral railing at [facility]. Engaged patient in leg exercises. Engaged patient in 30 second sit <> stand strength assessment with patient requiring use of hands to complete task - see below. Discussed discharge recommendation. Concluded session with patient sitting up in chair, all needs in reach and alarm engaged.    Barriers to return to prior living situation: LE weakness, decreased activity tolerance, SOB with activity, impaired balance, fall risk.   Recommendations for discharge: Home with HH-PT  Rationale for recommendations: Patient is anticipated to continue progressing with IP PT services in order to demonstrate necessary level of functional mobility with use of FWW to return to home environment. Patient is recommended to continue with skilled HH-PT in order to improve LE strength, balance and activity tolerance in order to return to baseline mobility.  Pt appropriate for Home PT as they require AD, assistive person, and taxing effort to leave the home.         Entered by: Padmini Noel 06/14/2020 10:13 AM      30 second sit <> stand  - Assessing Leg Strength & Endurance    Number of full stands from a seated position in 30 seconds with arms  folded across chest. If patient requires use of arms or assistance,  they receive a score of 0.     Patient Score: 0  Patient able to complete 3 reps in 30 seconds with use of UE.    Normative values for 30 second Chair Stand Test for community residing individuals. (Gumaro & Jese, 2013)    Age in Years 60-64 65-69 70-74 75-79 80-84 85-89 90-94   Men 14-19 12-18 12-17 11-17 10-15 8-14 7-12   Female 12-17 11-16 10-15 10-15 9-14 8-13 4-11     Excellent test -retest reliability r=0.89 (95% Confidence interval 0.79-0.93)  Excellent interrater reliability r=0.95 (95% CI = 0.84-0.97)  Excellent criterion validity of the chair stand compared to weight adjusted leg press performance: r=0.77 (95% CI = 0.64-0.85)  Correlations with knee extensor force  -Range 0.55 to 0.64 with hands  -Range 0.65 to 0.71 without hands

## 2020-06-14 NOTE — PLAN OF CARE
DATE & TIME: 6/13/20 3664-3775  Cognitive Concerns/ Orientation : A&O x4,    BEHAVIOR & AGGRESSION TOOL COLOR: Green  CIWA SCORE: 0,0   ABNL VS/O2: VSS, on RA.   MOBILITY: Assist of 1 GB + W; ambulated to BR.   PAIN MANAGMENT: Denies  DIET: Regular diet, strict I+O's.  BOWEL/BLADDER: Continent.(urinal at the bedside) BM x 1.  ABNL LAB/BG: Hgb 7.9; low iron; creat 1.79  DRAIN/DEVICES: RFA PIV SL  TELEMETRY RHYTHM: NA  SKIN: Scattered bruises   TESTS/PROCEDURES:   D/C DAY/GOALS/PLACE: awaiting intensive residential treatment  OTHER IMPORTANT INFO: Albumin infusion given; followed by Renal, GI.

## 2020-06-14 NOTE — PLAN OF CARE
DATE & TIME: 6/14/20 4772-4590  Cognitive Concerns/ Orientation : A&O x4,    BEHAVIOR & AGGRESSION TOOL COLOR: Green  CIWA SCORE: 2,2  ABNL VS/O2: VSS, on RA.   MOBILITY: Assist of 1 GB + W; ambulated to BR. Declined to walk in mnuiz today, feeling tired with little sleep overnight.  Up in chair for meals and BRP's.  PAIN MANAGMENT: Denies  DIET: Regular diet,   BOWEL/BLADDER: Continent.(urinal at the bedside) BM x 1.  ABNL LAB/BG: Hgb 7.9; low iron; creat 1.79  DRAIN/DEVICES: RFA PIV SL  TELEMETRY RHYTHM: NA  SKIN: Scattered bruises   TESTS/PROCEDURES:   D/C DAY/GOALS/PLACE: awaiting intensive residential treatment  OTHER IMPORTANT INFO: Albumin infusion given; followed by Renal, GI.

## 2020-06-14 NOTE — PROGRESS NOTES
Renal Medicine Progress Note                                Jim Richard MRN# [id number]   Age: 65 year old YOB: 1955   Date of Admission: 6/10/2020 Hospital LOS: 4                  Assessment/Plan:     65-year-old male admitted via the emergency room, dated 06/10/2020-06/11/2020 in the setting of alcohol intoxication and suicidal ideation    Evaluated with respect to elevated serum creatinine      1.  Baseline CKD   -creatinine 1.4 mg/dl   -eGFR 40 to 50 ml/min   -US reviewed  2.  Proteinuria   -modest increased ACR  3.  ARF   -subacute component over preceding months   -recent further increase    -Lona < 5; pre renal v HRS    -continue 25% albumin  4.  Anemia   -Fe deficient  5.  Alcoholic cirrhosis with portal HTN  6.  BPH and ? Chronic MARTINEZ      Modest improvement in renal function post 2 days of 25% albumin  infusion    Tolerating IV Fe    Repeat Lona  No albumin today  No further loop diuretic         Interval History:     IO and UO reviewed  Labs reviewed    Slight improvement in renal function  Likely near baseline function    Feels OK  Eating breakfast    BP OK  Not low       ROS:     GENERAL: NAD, No fever,chills  R: NEGATIVE for significant cough or SOB  CV: NEGATIVE for chest pain, palpitations  EXT: no change edema  ROS otherwise negative    Medications and Allergies:     Reviewed    Physical Exam:     Vitals were reviewed  Patient Vitals for the past 12 hrs:   BP Temp Temp src Pulse Heart Rate Resp SpO2   06/14/20 0755 119/69 98.8  F (37.1  C) Oral 56 57 16 94 %   06/14/20 0023 125/74 98.9  F (37.2  C) Oral -- 60 16 95 %   06/13/20 2123 -- 98.8  F (37.1  C) Oral -- -- -- --     I/O last 3 completed shifts:  In: 940 [P.O.:740; I.V.:100]  Out: -     Vitals:    06/10/20 0158   Weight: 86.2 kg (190 lb)         GENERAL: awake, alert, follows  HEENT: NC/AT, PERRLA, EOMI, non icteric, pharynx moist without lesion  NECK: supple, no masses or adenopathy  RESP:  clear anteriorly  CV: RRR, normal  S1 S2  ABDOMEN: soft, nontender, no HSM or masses and bowel sounds normal  MS: no clubbing, cyanosis   SKIN: clear without significant rashes or lesions  NEURO: speech normal and cranial nerves 2-12 intact  PSYCH: affect normal/bright  EXT: warm, no edema    Data:     Recent Labs   Lab 06/14/20  0745 06/13/20  0838 06/12/20  0557 06/11/20  0602    133 132* 136   POTASSIUM 3.7 3.8 3.8 4.0   CHLORIDE 106 104 102 104   CO2 22 24 24 26   ANIONGAP 8 5 6 6   * 120* 125* 98   BUN 26 28 29 31*   CR 1.66* 1.79* 1.89* 1.75*   GFRESTIMATED 42* 39* 36* 40*   GFRESTBLACK 49* 45* 42* 46*   KEV 8.1* 8.1* 7.8* 7.6*         Recent Labs   Lab Test 06/14/20  0745 06/13/20  0838 06/12/20  0557 06/11/20  0602 06/10/20  0206 05/26/20  1215 05/14/20  2322 05/03/20  0738 05/01/20  1525 04/03/20  0636   CR 1.66* 1.79* 1.89* 1.75* 1.72* 1.43* 1.15 1.47* 1.49* 1.39*     Recent Labs   Lab 06/10/20  0206   ALBUMIN 2.7*     Recent Labs   Lab 06/14/20  0745 06/13/20  0838 06/12/20  0557 06/11/20  2226   HGB 8.1* 7.9* 8.4* 8.5*     No lab results found in last 7 days.  Recent Labs   Lab 06/12/20  1130   UNAR <5     Recent Labs   Lab 06/12/20  1130   UMALCR 30.16*         G Brody Ayala MD    [facility] - Nephrology  [phone]

## 2020-06-14 NOTE — PLAN OF CARE
DATE & TIME: 6/13/20 1900-2330  Cognitive Concerns/ Orientation : A&O x4,    BEHAVIOR & AGGRESSION TOOL COLOR: Green  CIWA SCORE: 2 for tremor   ABNL VS/O2: VSS, on RA.   MOBILITY: Assist of 1 GB + W; ambulated to BR.   PAIN MANAGMENT: Denies  DIET: Regular diet, strict I+O's.  BOWEL/BLADDER: Continent.(urinal at the bedside) BM x 1.  ABNL LAB/BG: Hgb 7.9; low iron; creat 1.79  DRAIN/DEVICES: RFA PIV SL  TELEMETRY RHYTHM: NA  SKIN: Scattered bruises   TESTS/PROCEDURES:   D/C DAY/GOALS/PLACE: awaiting intensive residential treatment  OTHER IMPORTANT INFO: Albumin infusion given; followed by Renal, GI. Resting between cares. Seroquel prn given at HS

## 2020-06-15 ENCOUNTER — APPOINTMENT (OUTPATIENT)
Dept: PHYSICAL THERAPY | Facility: CLINIC | Age: 65
DRG: 432 | End: 2020-06-15
Payer: MEDICARE

## 2020-06-15 ENCOUNTER — APPOINTMENT (OUTPATIENT)
Dept: ULTRASOUND IMAGING | Facility: CLINIC | Age: 65
DRG: 432 | End: 2020-06-15
Attending: PHYSICIAN ASSISTANT
Payer: MEDICARE

## 2020-06-15 LAB
ANION GAP SERPL CALCULATED.3IONS-SCNC: 6 MMOL/L (ref 3–14)
BUN SERPL-MCNC: 26 MG/DL (ref 7–30)
CALCIUM SERPL-MCNC: 8 MG/DL (ref 8.5–10.1)
CHLORIDE SERPL-SCNC: 105 MMOL/L (ref 94–109)
CO2 SERPL-SCNC: 23 MMOL/L (ref 20–32)
CREAT SERPL-MCNC: 1.76 MG/DL (ref 0.66–1.25)
DEPRECATED CALCIDIOL+CALCIFEROL SERPL-MC: 12 UG/L (ref 20–75)
GFR SERPL CREATININE-BSD FRML MDRD: 39 ML/MIN/{1.73_M2}
GLUCOSE SERPL-MCNC: 127 MG/DL (ref 70–99)
POTASSIUM SERPL-SCNC: 3.7 MMOL/L (ref 3.4–5.3)
SODIUM SERPL-SCNC: 134 MMOL/L (ref 133–144)

## 2020-06-15 PROCEDURE — 25000125 ZZHC RX 250: Performed by: RADIOLOGY

## 2020-06-15 PROCEDURE — 99232 SBSQ HOSP IP/OBS MODERATE 35: CPT | Performed by: HOSPITALIST

## 2020-06-15 PROCEDURE — 80048 BASIC METABOLIC PNL TOTAL CA: CPT | Performed by: HOSPITALIST

## 2020-06-15 PROCEDURE — 25000128 H RX IP 250 OP 636: Performed by: HOSPITALIST

## 2020-06-15 PROCEDURE — 97116 GAIT TRAINING THERAPY: CPT | Mod: GP | Performed by: PHYSICAL THERAPY ASSISTANT

## 2020-06-15 PROCEDURE — 12000000 ZZH R&B MED SURG/OB

## 2020-06-15 PROCEDURE — 40000863 ZZH STATISTIC RADIOLOGY XRAY, US, CT, MAR, NM

## 2020-06-15 PROCEDURE — 25000128 H RX IP 250 OP 636: Performed by: INTERNAL MEDICINE

## 2020-06-15 PROCEDURE — 25800030 ZZH RX IP 258 OP 636: Performed by: INTERNAL MEDICINE

## 2020-06-15 PROCEDURE — 27210190 US PARACENTESIS

## 2020-06-15 PROCEDURE — 25000132 ZZH RX MED GY IP 250 OP 250 PS 637: Mod: GY | Performed by: HOSPITALIST

## 2020-06-15 PROCEDURE — 36415 COLL VENOUS BLD VENIPUNCTURE: CPT | Performed by: HOSPITALIST

## 2020-06-15 PROCEDURE — 0W9G3ZZ DRAINAGE OF PERITONEAL CAVITY, PERCUTANEOUS APPROACH: ICD-10-PCS | Performed by: RADIOLOGY

## 2020-06-15 PROCEDURE — P9047 ALBUMIN (HUMAN), 25%, 50ML: HCPCS | Performed by: HOSPITALIST

## 2020-06-15 RX ORDER — CARBOXYMETHYLCELLULOSE SODIUM 5 MG/ML
2 SOLUTION/ DROPS OPHTHALMIC
Status: DISCONTINUED | OUTPATIENT
Start: 2020-06-15 | End: 2020-07-02 | Stop reason: HOSPADM

## 2020-06-15 RX ORDER — DEXTROSE MONOHYDRATE 25 G/50ML
25-50 INJECTION, SOLUTION INTRAVENOUS
Status: DISCONTINUED | OUTPATIENT
Start: 2020-06-15 | End: 2020-06-15

## 2020-06-15 RX ORDER — NICOTINE POLACRILEX 4 MG
15-30 LOZENGE BUCCAL
Status: DISCONTINUED | OUTPATIENT
Start: 2020-06-15 | End: 2020-06-15

## 2020-06-15 RX ORDER — LIDOCAINE HYDROCHLORIDE 10 MG/ML
10 INJECTION, SOLUTION EPIDURAL; INFILTRATION; INTRACAUDAL; PERINEURAL ONCE
Status: COMPLETED | OUTPATIENT
Start: 2020-06-15 | End: 2020-06-15

## 2020-06-15 RX ORDER — LIDOCAINE 40 MG/G
CREAM TOPICAL
Status: DISCONTINUED | OUTPATIENT
Start: 2020-06-15 | End: 2020-06-15

## 2020-06-15 RX ORDER — ALBUMIN (HUMAN) 12.5 G/50ML
12.5-5 SOLUTION INTRAVENOUS ONCE
Status: DISCONTINUED | OUTPATIENT
Start: 2020-06-15 | End: 2020-06-18

## 2020-06-15 RX ADMIN — FLUTICASONE FUROATE AND VILANTEROL TRIFENATATE 1 PUFF: 200; 25 POWDER RESPIRATORY (INHALATION) at 07:41

## 2020-06-15 RX ADMIN — TAMSULOSIN HYDROCHLORIDE 0.4 MG: 0.4 CAPSULE ORAL at 19:53

## 2020-06-15 RX ADMIN — MULTIPLE VITAMINS W/ MINERALS TAB 1 TABLET: TAB at 07:39

## 2020-06-15 RX ADMIN — ONDANSETRON 4 MG: 2 INJECTION INTRAMUSCULAR; INTRAVENOUS at 17:00

## 2020-06-15 RX ADMIN — CARBOXYMETHYLCELLULOSE SODIUM 2 DROP: 5 SOLUTION/ DROPS OPHTHALMIC at 16:19

## 2020-06-15 RX ADMIN — MIRTAZAPINE 15 MG: 15 TABLET, FILM COATED ORAL at 21:17

## 2020-06-15 RX ADMIN — QUETIAPINE FUMARATE 50 MG: 50 TABLET ORAL at 21:17

## 2020-06-15 RX ADMIN — TRAZODONE HYDROCHLORIDE 100 MG: 50 TABLET ORAL at 21:17

## 2020-06-15 RX ADMIN — CARBOXYMETHYLCELLULOSE SODIUM 2 DROP: 5 SOLUTION/ DROPS OPHTHALMIC at 11:02

## 2020-06-15 RX ADMIN — PANTOPRAZOLE SODIUM 40 MG: 40 TABLET, DELAYED RELEASE ORAL at 16:19

## 2020-06-15 RX ADMIN — IRON SUCROSE 200 MG: 20 INJECTION, SOLUTION INTRAVENOUS at 07:48

## 2020-06-15 RX ADMIN — PANTOPRAZOLE SODIUM 40 MG: 40 TABLET, DELAYED RELEASE ORAL at 06:54

## 2020-06-15 RX ADMIN — CARBOXYMETHYLCELLULOSE SODIUM 2 DROP: 5 SOLUTION/ DROPS OPHTHALMIC at 19:53

## 2020-06-15 RX ADMIN — VORTIOXETINE 10 MG: 10 TABLET, FILM COATED ORAL at 07:38

## 2020-06-15 RX ADMIN — LIDOCAINE HYDROCHLORIDE 10 ML: 10 INJECTION, SOLUTION EPIDURAL; INFILTRATION; INTRACAUDAL; PERINEURAL at 14:31

## 2020-06-15 RX ADMIN — ONDANSETRON 4 MG: 2 INJECTION INTRAMUSCULAR; INTRAVENOUS at 11:02

## 2020-06-15 RX ADMIN — FOLIC ACID 1 MG: 1 TABLET ORAL at 07:38

## 2020-06-15 ASSESSMENT — ACTIVITIES OF DAILY LIVING (ADL)
ADLS_ACUITY_SCORE: 14
ADLS_ACUITY_SCORE: 13
ADLS_ACUITY_SCORE: 14
ADLS_ACUITY_SCORE: 13

## 2020-06-15 NOTE — PROGRESS NOTES
[facility]    Nephrology Progress Note     Assessment & Plan       65-year-old male admitted via the emergency room, dated 06/10/2020-06/11/2020 in the setting of alcohol intoxication and suicidal ideation     Evaluated with respect to elevated serum creatinine        1.  Baseline CKD              -creatinine 1.4 mg/dl              -eGFR 40 to 50 ml/min              -US reviewed  2.  Proteinuria              -modest increased ACR  3.  ARF              -subacute component over preceding months              -recent further increase                          -Lona < 5; pre renal v HRS     - new baseline cr 1.7-1.8 ?    4.  Anemia              -Fe deficient on IV Fe  5.  Alcoholic cirrhosis with portal HTN  6.  BPH and ? Chronic MARTINEZ     Plan:    He had IV albumin following paracentesis today.    Following.      Dez Mann MD  [facility]phrology  [phone]    Interval History     He thinks he is voiding more with out the diuretics.  Abd less distended post para.  Appetite OK.  No N/V.      Physical Exam   Temp: 98.7  F (37.1  C) Temp src: Oral BP: 122/68   Heart Rate: 56 Resp: 16 SpO2: 95 % O2 Device: None (Room air)    Vitals:    06/10/20 0158   Weight: 86.2 kg (190 lb)     Vital Signs with Ranges  Temp:  [97.6  F (36.4  C)-98.9  F (37.2  C)] 98.7  F (37.1  C)  Heart Rate:  [55-65] 56  Resp:  [16-20] 16  BP: ()/(59-74) 122/68  SpO2:  [94 %-96 %] 95 %  I/O last 3 completed shifts:  In: 1060 [P.O.:1060]  Out: 450 [Urine:450]    GENERAL APPEARANCE: pleasant, NAD, a & o  HEENT:  Eyes/ears/nose/neck grossly normal  RESP: lungs cta b c good efforts, no crackles, rhonchi or wheezes  CV: RRR, nl S1/S2, no m/r/g   ABDOMEN: distended, soft, NT  EXTREMITIES/SKIN: no rashes/lesions; no edema    Medications       albumin human  12.5-50 g Intravenous Once     fluticasone-vilanterol  1 puff Inhalation Daily     folic acid  1 mg Oral Daily     mirtazapine  15 mg Oral At Bedtime      multivitamin w/minerals  1 tablet Oral Daily     pantoprazole  40 mg Oral BID AC     sodium chloride (PF)  3 mL Intracatheter Q8H     tamsulosin  0.4 mg Oral QPM     traZODone  100 mg Oral At Bedtime     vortioxetine  10 mg Oral Daily       Data   BMP  Recent Labs   Lab 06/15/20  0837 06/14/20  0745 06/13/20  0838 06/12/20  0557    136 133 132*   POTASSIUM 3.7 3.7 3.8 3.8   CHLORIDE 105 106 104 102   KEV 8.0* 8.1* 8.1* 7.8*   CO2 23 22 24 24   BUN 26 26 28 29   CR 1.76* 1.66* 1.79* 1.89*   * 126* 120* 125*     Phos@[facility](phos:4)  CBC)  Recent Labs   Lab 06/14/20 0745 06/13/20  0838 06/12/20  0557 06/11/20  2226  06/10/20  0206   WBC  --   --   --   --   --  5.9   HGB 8.1* 7.9* 8.4* 8.5*   < > 9.6*   HCT  --   --   --   --   --  28.3*   MCV  --   --   --   --   --  89   PLT  --   --   --   --   --  161    < > = values in this interval not displayed.     Recent Labs   Lab 06/12/20 0557 06/10/20  0206   AST  --  89*   ALT  --  30   ALKPHOS  --  239*   BILITOTAL  --  1.0   ELPIDIO 44  --      Recent Labs   Lab 06/10/20  0206   INR 1.22*     No results found for: D2VIT, D3VIT, DTOT  Recent Labs   Lab 06/14/20 0745 06/13/20  0838  06/10/20  0206   HGB 8.1* 7.9*   < > 9.6*   HCT  --   --   --  28.3*   MCV  --   --   --  89   IRON  --  16*  --   --    IRONSAT  --  12*  --   --    FEB  --  138*  --   --    KATE  --  72  --   --     < > = values in this interval not displayed.     Recent Labs   Lab 06/13/20 0838   PTHI 34       Attestation:   I have reviewed today's relevant vital signs, notes, medications, labs and imaging.

## 2020-06-15 NOTE — PLAN OF CARE
DATE & TIME: 6/15/20 6496-3995  Cognitive Concerns/ Orientation : A&O x4,    BEHAVIOR & AGGRESSION TOOL COLOR: Green  CIWA SCORE: 0,0   ABNL VS/O2: BP 99/59, HR 55  MOBILITY: SBA 1/GB/walker; walked muniz x 2; up in chair for meals  PAIN MANAGMENT:   DIET: Regular diet, 1500 ml fluid restriction started  BOWEL/BLADDER: Continent.(urinal at the bedside) .  ABNL LAB/BG:  creat 1.76  DRAIN/DEVICES: RFA PIV SL  TELEMETRY RHYTHM: NA  SKIN: Scattered bruises   TESTS/PROCEDURES: Paracentesis today; 4300 ml removed per report; patient feels better post procedure and VSS.  D/C DAY/GOALS/PLACE: awaiting intensive residential treatment  OTHER IMPORTANT INFO: followed by Renal, GI.  Zofran given for nausea with relief.

## 2020-06-15 NOTE — PROGRESS NOTES
[facility]  Hospitalist Progress Note        Jace Koenig MD   06/15/2020        Interval History:      - feels abdominal distension getting worse; awaiting placement         Assessment and Plan:        Jim Richard is a 65 year old male with PMH significant for alcohol abuse, alcoholic cirrhosis with ascites, JANIS, depression and anxiety, history of G.I. bleed with esophageal varices who presented to ER with alcohol intoxication and suicidal ideation. While in ED he also reported some dark stools and admitted for further evaluation of G.I. bleed     #Likely G.I. bleed with history of esophagitis and esophageal varices  #chronic anemia and thrombocytopenia  - hemodynamically stable with no further melena  - baseline hemoglobin 8-9; hb 9.6---9.0----8.3---stabilizing around 8-8.5  - evaluated by GI, EGD 6/11 noted again with grade 1 esophageal varices, moderate portal gastropathy  - continue Protonix PO bid  - was on Rocephin IV for SBP prophylaxis, discontinued 6/13  - does have s/o iron deficiency; got IV iron per nephrology    #Acute on CKD  - baseline creatinine around 1.3-1.5  - creatinine on admission 1.7, likely prerenal secondary to alcohol abuse and poor PO intake; held off on PTA Lasix, Aldactone and started on cautious IV hydration but renal function worsening; IVF d/abhijeet on 6/11 evening  - Cr 1.72---1.75---1.89---1.79---1.66---1.76  - nephrology following, suggested no more diuretics (might need frequent paracentesis for ascites); - renal US (6/12) UR except for some evidence of chronic urinary bladder outlet obstruction; got IV albumin per  Nephrology  - monitor BMP     #Alcohol abuse/dependence  #alcoholic cirrhosis with ascites  #medication noncompliance  #Anxiety and depression  -had stopped taking all his medications recently PTA  -had ultrasound-guided paracentesis done in ED 5/10; 4 L removed  -continue to hold off on PTA Lasix and Aldactone for now given worsening renal  "function   -blood alcohol level on admission 0.32,   - continue Valium per CIWA protocol, no significant withdrawal  - evaluated by psych , suggested to resume PTA antidepressants which he has been non-compliant with; continue PTA Remeron, Seroquel, trazodone, Trintellix  - apparently his commitment is been revoked,  for disposition planning-- working on Intensive Residential Treatment Services  - tremors much improved--discontinued clonidine 6/14  - reports worsening abdominal distension; diuretics use currently limited with worsened renal function and soft BP  - will order repeat paracentesis 6/15     #BPH: continue Flomax     #COPD  -respiratory status stable continue; continue PTA inhalers     # DVT prophylaxis: mechanical with PCD boots  # Code status: full code    Disposition: unclear, pending placement, improvement in renal function; SW for disposition                     Physical Exam:      Blood pressure 99/59, pulse 56, temperature 98.4  F (36.9  C), temperature source Oral, resp. rate 18, height 1.702 m (5' 7\"), weight 86.2 kg (190 lb), SpO2 94 %.  Vitals:    06/10/20 0158   Weight: 86.2 kg (190 lb)     Vital Signs with Ranges  Temp:  [97.7  F (36.5  C)-99  F (37.2  C)] 98.4  F (36.9  C)  Heart Rate:  [54-65] 55  Resp:  [18-20] 18  BP: ()/(59-74) 99/59  SpO2:  [94 %-96 %] 94 %  I/O's Last 24 hours  I/O last 3 completed shifts:  In: 580 [P.O.:580]  Out: 450 [Urine:450]    Constitutional: Alert, awake and orienetd X 3; lying comfortably in bed in no apparent distress   HEENT: Pupils equal and reactive to light and accomodation, EOMI intact; neck supple no raised JVD or rigidity    Oral cavity: Moist oral mucosa   Cardiovascular: Normal s1 s2, regular rate and rhythm, no murmur   Lungs: B/l clear to auscultation, no wheezes or crepitations   Abdomen: Soft, distended, ascites +, nd, no guarding, rigidity or rebound; BS +   LE : No edema   Musculoskeletal: Power 5/5 in all extremities  "   Neuro: No focal neurological deficits noted, CN II to XII grossly intact   Psychiatry: normal mood and affect  Skin: No obvious skin rashes or ulcers                  Medications:          fluticasone-vilanterol  1 puff Inhalation Daily     folic acid  1 mg Oral Daily     mirtazapine  15 mg Oral At Bedtime     multivitamin w/minerals  1 tablet Oral Daily     pantoprazole  40 mg Oral BID AC     sodium chloride (PF)  3 mL Intracatheter Q8H     tamsulosin  0.4 mg Oral QPM     traZODone  100 mg Oral At Bedtime     vortioxetine  10 mg Oral Daily     PRN Meds: artificial tears ophthalmic solution, diazepam **OR** diazepam, lidocaine 4%, lidocaine (buffered or not buffered), melatonin, naloxone, ondansetron **OR** ondansetron, prochlorperazine **OR** prochlorperazine **OR** prochlorperazine, QUEtiapine, QUEtiapine, sodium chloride (PF), sodium chloride (PF)         Data:      All new lab and imaging data was reviewed.   Recent Labs   Lab Test 06/14/20  0745 06/13/20  0838 06/12/20  0557  06/10/20  0206 05/26/20  1215 05/14/20  2322   WBC  --   --   --   --  5.9 2.8* 7.0   HGB 8.1* 7.9* 8.4*   < > 9.6* 8.7* 9.5*   MCV  --   --   --   --  89 92 88   PLT  --   --   --   --  161 78* 205   INR  --   --   --   --  1.22* 1.30* 1.23*    < > = values in this interval not displayed.      Recent Labs   Lab Test 06/15/20  0837 06/14/20  0745 06/13/20  0838    136 133   POTASSIUM 3.7 3.7 3.8   CHLORIDE 105 106 104   CO2 23 22 24   BUN 26 26 28   CR 1.76* 1.66* 1.79*   ANIONGAP 6 8 5   KEV 8.0* 8.1* 8.1*   * 126* 120*     Recent Labs   Lab Test 06/10/20  0206 04/26/18  1940   TROPI 0.023 <0.015

## 2020-06-15 NOTE — PLAN OF CARE
Discharge Planner PT   Patient plan for discharge: Unsure  Current status: Pt performed supine to sit transfer with SBA.  Sit to/from stand transfer with SBA.  Gait training x 450 ft using wheeled walker and CGA.  Amb with wide [place] and increased lateral sway.  Mild unsteadiness noted at times but no significant LOB.    Barriers to return to prior living situation: LE weakness, decreased activity tolerance, impaired balance, fall risk.   Recommendations for discharge: Home with Home PT per plan established by the PT.   Rationale for recommendations: Patient is anticipated to continue progressing with IP PT services in order to demonstrate necessary level of functional mobility with use of FWW to return to home environment. Patient is recommended to continue with skilled HH-PT in order to improve LE strength, balance and activity tolerance in order to return to baseline mobility.  Pt appropriate for Home PT as they require AD, assistive person, and taxing effort to leave the home.       Entered by: Adriana Unger 06/15/2020 12:18 PM

## 2020-06-15 NOTE — PLAN OF CARE
DATE & TIME: 6/14/20 7678-5317  Cognitive Concerns/ Orientation : A&O x4,    BEHAVIOR & AGGRESSION TOOL COLOR: Green  CIWA SCORE: 0,0   ABNL VS/O2: VSS, on RA.   MOBILITY: Assist of 1 GB + W; ambulated to BR.   PAIN MANAGMENT: Denies. Melatonin given x1 to aid sleeping  DIET: Regular diet,   BOWEL/BLADDER: Continent.(urinal at the bedside) .  ABNL LAB/BG: Hgb 7.9; low iron; creat 1.79  DRAIN/DEVICES: RFA PIV SL  TELEMETRY RHYTHM: NA  SKIN: Scattered bruises   TESTS/PROCEDURES:   D/C DAY/GOALS/PLACE: awaiting intensive residential treatment  OTHER IMPORTANT INFO: followed by Renal, GI.

## 2020-06-15 NOTE — PROGRESS NOTES
[facility]  Gastroenterology Progress Note     Jim Richard MRN# [id number]   YOB: 1955 Age: 65 year old          Assessment and Plan:   Jim Richard is a pleasant 65 year old male with alcohol liver cirrhosis that presented with complaints of alcohol intoxication and melena.GI consulted for evaluation of melena.    Alcoholic cirrhosis with ascites  Nausea without vomiting  Renal insufficiency  Patient has known alcohol abuse and dependence and consumes 0.75 L of vodka daily. Has developed alcohol liver cirrhosis with ascites.   - 6/12 EGD noted Grade 1 esophageal varices.  - Hemoglobin stable at 8.1.   - 6/10 Paracentesis with 4L of fluid removed. Abdomen distended and patient has shortness of breath. Significant ascites present. Patient will need repeat ultrasound guided paracentesis. Hope to start diuretics soon- Creatinine is improving but not to baseline. (baseline 1.3-1.4) Patient has had exacerbation of symptoms associated with liver cirrhosis due to chronic intake of alcohol. His MELD score is 14. He appears comfortable and is tolerating alcohol withdrawal.     - Recommend daily labs                Interval History:   Patient complaining of nausea and shortness of breath. Denies wheezing or coughing.              Review of Systems:   C: NEGATIVE for fever, chills, change in weight  E/M: NEGATIVE for ear, mouth and throat problems  R: NEGATIVE for significant cough or SOB  CV: NEGATIVE for chest pain, palpitations or peripheral edema             Medications:   I have reviewed this patient's current medications    fluticasone-vilanterol  1 puff Inhalation Daily     folic acid  1 mg Oral Daily     mirtazapine  15 mg Oral At Bedtime     multivitamin w/minerals  1 tablet Oral Daily     pantoprazole  40 mg Oral BID AC     sodium chloride (PF)  3 mL Intracatheter Q8H     tamsulosin  0.4 mg Oral QPM     traZODone  100 mg Oral At Bedtime     vortioxetine  10 mg Oral Daily                   Physical Exam:   Vitals were reviewed  Vital Signs with Ranges  Temp:  [97.7  F (36.5  C)-99  F (37.2  C)] 98.4  F (36.9  C)  Heart Rate:  [54-65] 55  Resp:  [18-20] 18  BP: ()/(59-74) 99/59  SpO2:  [94 %-96 %] 94 %  I/O last 3 completed shifts:  In: 580 [P.O.:580]  Out: 450 [Urine:450]  Constitutional: healthy, alert and no distress   Cardiovascular: negative, PMI normal. No lifts, heaves, or thrills. RRR. No murmurs, clicks gallops or rub  Respiratory: negative, Percussion normal. Good diaphragmatic excursion. Lungs clear  Head: Normocephalic. No masses, lesions, tenderness or abnormalities  Neck: Neck supple. No adenopathy. Thyroid symmetric, normal size,, Carotids without bruits.  Abdomen: Abdomen firm and distended, mildly tender. BS normal. No masses, organomegaly, positive findings: distended           Data:   I reviewed the patient's new clinical lab test results.   Recent Labs   Lab Test 06/14/20  0745 06/13/20  0838 06/12/20  0557  06/10/20  0206 05/26/20 1215 05/14/20 2322   WBC  --   --   --   --  5.9 2.8* 7.0   HGB 8.1* 7.9* 8.4*   < > 9.6* 8.7* 9.5*   MCV  --   --   --   --  89 92 88   PLT  --   --   --   --  161 78* 205   INR  --   --   --   --  1.22* 1.30* 1.23*    < > = values in this interval not displayed.     Recent Labs   Lab Test 06/15/20  0837 06/14/20  0745 06/13/20  0838   POTASSIUM 3.7 3.7 3.8   CHLORIDE 105 106 104   CO2 PENDING 22 24   BUN PENDING 26 28   ANIONGAP PENDING 8 5     Recent Labs   Lab Test 06/10/20  0206 05/26/20  1612 05/26/20  1215 05/14/20  2322  02/03/20  0218  01/11/20  1730  11/13/19  1858 11/13/19  1849   ALBUMIN 2.7*  --  2.9* 2.8*   < > 2.7*   < >  --    < > 3.0*  --    BILITOTAL 1.0  --  1.7* 0.7   < > 1.6*   < >  --    < > 1.0  --    ALT 30  --  29 26   < > 26   < >  --    < > 24  --    AST 89*  --  65* 45   < > 61*   < >  --    < > 58*  --    PROTEIN  --  30*  --   --   --   --   --  Negative  --   --  30*   LIPASE  --   --  346  --   --  369   --   --   --  499*  --     < > = values in this interval not displayed.       I reviewed the patient's new imaging results.    All laboratory data reviewed  All imaging studies reviewed by me.    Gloria De Leon PA-C,  6/15/2020  [facility]  Office : [phone]  Cell: [phone] (Dr. Shaw)  Cell: [phone] (Gloria De Leon PA-C)

## 2020-06-15 NOTE — PROGRESS NOTES
DATE & TIME: 6/14/20   Cognitive Concerns/ Orientation : A&O x4,    BEHAVIOR & AGGRESSION TOOL COLOR: Green  CIWA SCORE: 5-2.   ABNL VS/O2: VSS, on RA.   MOBILITY: Assist of 1 GB + W; ambulated to BR. , Amb in hallway x1.  ,  Up in chair for meals and BRP's.  PAIN MANAGMENT: Denies  DIET: Regular diet,   BOWEL/BLADDER: Continent.(urinal at the bedside)   ABNL LAB/BG: Hgb 7.9; low iron; creat 1.79  DRAIN/DEVICES: RFA PIV SL  TELEMETRY RHYTHM: NA  SKIN: Scattered bruises   TESTS/PROCEDURES:   D/C DAY/GOALS/PLACE: awaiting intensive residential treatment  OTHER IMPORTANT INFO: Medicated x1 with IV Zofran for c/o nausea. Abdomen large, distended, . Pt. States he hopes he will have another paracentesis tomorrow.

## 2020-06-15 NOTE — PROGRESS NOTES
"SPIRITUAL HEALTH SERVICES: Tele-Encounter  Patient Location: Room  Spoke with: Jim    Referral Source: Length Of Stay    DATA:  Shared reflective conversation and spiritual assessment with pt around noon. Pt reported feeling \"down,\" but was coping \"one day at a time.\" Inquired into pts support system and pt reported that his family did not know he was in hospital but \"that'd they'd find out soon enough.\" When  followed-up on support system question, pt declined to comment further but asked for a blessing.  obliged and informed pt how to get in touch with [facility].    PLAN: Will follow-up if requested      Jeff Buchanan Intern      ______________________________    Type of service:  Telephone Visit     has received verbal consent for a TelephoneVisit from the patient? Yes    Distance Provider Location: designated [place] office or home office (secure setting)    Mode of Communication: telephone (via [employer] phone or [employer] tele-call-number ([phone]))  "

## 2020-06-15 NOTE — PROGRESS NOTES
Care Suites Procedure Nursing Note    Patient Information  Name: Jim Richard  Age: 65 year old    Procedure  Procedure: Paracentesis  Procedure start time: 14:30  Procedure complete time: 14:50  Concerns/abnormal assessment: None  Patient tolerated procedure well.  4,300 mL yellow fluid removed from left side.  Dermabond and Bandaid CDI  Plan/Other: Return to room 603 via cart.  Report called to VASQUEZ Malave.

## 2020-06-16 ENCOUNTER — APPOINTMENT (OUTPATIENT)
Dept: PHYSICAL THERAPY | Facility: CLINIC | Age: 65
DRG: 432 | End: 2020-06-16
Payer: MEDICARE

## 2020-06-16 LAB
ANION GAP SERPL CALCULATED.3IONS-SCNC: 6 MMOL/L (ref 3–14)
BUN SERPL-MCNC: 27 MG/DL (ref 7–30)
CALCIUM SERPL-MCNC: 8.7 MG/DL (ref 8.5–10.1)
CHLORIDE SERPL-SCNC: 108 MMOL/L (ref 94–109)
CO2 SERPL-SCNC: 24 MMOL/L (ref 20–32)
CREAT SERPL-MCNC: 1.8 MG/DL (ref 0.66–1.25)
GFR SERPL CREATININE-BSD FRML MDRD: 38 ML/MIN/{1.73_M2}
GLUCOSE SERPL-MCNC: 91 MG/DL (ref 70–99)
POTASSIUM SERPL-SCNC: 4.2 MMOL/L (ref 3.4–5.3)
SODIUM SERPL-SCNC: 138 MMOL/L (ref 133–144)

## 2020-06-16 PROCEDURE — 99207 ZZC CDG-MDM COMPONENT: MEETS MODERATE - UP CODED: CPT | Performed by: HOSPITALIST

## 2020-06-16 PROCEDURE — 97112 NEUROMUSCULAR REEDUCATION: CPT | Mod: GP | Performed by: PHYSICAL THERAPIST

## 2020-06-16 PROCEDURE — 25000132 ZZH RX MED GY IP 250 OP 250 PS 637: Mod: GY | Performed by: INTERNAL MEDICINE

## 2020-06-16 PROCEDURE — 12000000 ZZH R&B MED SURG/OB

## 2020-06-16 PROCEDURE — 97116 GAIT TRAINING THERAPY: CPT | Mod: GP | Performed by: PHYSICAL THERAPIST

## 2020-06-16 PROCEDURE — 25000132 ZZH RX MED GY IP 250 OP 250 PS 637: Mod: GY | Performed by: HOSPITALIST

## 2020-06-16 PROCEDURE — 99232 SBSQ HOSP IP/OBS MODERATE 35: CPT | Performed by: HOSPITALIST

## 2020-06-16 PROCEDURE — 80048 BASIC METABOLIC PNL TOTAL CA: CPT | Performed by: HOSPITALIST

## 2020-06-16 PROCEDURE — 36415 COLL VENOUS BLD VENIPUNCTURE: CPT | Performed by: HOSPITALIST

## 2020-06-16 PROCEDURE — 25000128 H RX IP 250 OP 636: Performed by: HOSPITALIST

## 2020-06-16 RX ORDER — CARBOXYMETHYLCELLULOSE SODIUM 5 MG/ML
2 SOLUTION/ DROPS OPHTHALMIC
Status: DISCONTINUED | OUTPATIENT
Start: 2020-06-16 | End: 2020-06-16

## 2020-06-16 RX ORDER — FUROSEMIDE 20 MG
20 TABLET ORAL DAILY
Status: DISCONTINUED | OUTPATIENT
Start: 2020-06-16 | End: 2020-06-18

## 2020-06-16 RX ORDER — SPIRONOLACTONE 25 MG/1
25 TABLET ORAL DAILY
Status: DISCONTINUED | OUTPATIENT
Start: 2020-06-16 | End: 2020-06-20

## 2020-06-16 RX ADMIN — ONDANSETRON 4 MG: 4 TABLET, ORALLY DISINTEGRATING ORAL at 10:44

## 2020-06-16 RX ADMIN — CARBOXYMETHYLCELLULOSE SODIUM 2 DROP: 5 SOLUTION/ DROPS OPHTHALMIC at 21:41

## 2020-06-16 RX ADMIN — FUROSEMIDE 20 MG: 20 TABLET ORAL at 17:43

## 2020-06-16 RX ADMIN — PANTOPRAZOLE SODIUM 40 MG: 40 TABLET, DELAYED RELEASE ORAL at 16:03

## 2020-06-16 RX ADMIN — SPIRONOLACTONE 25 MG: 25 TABLET ORAL at 17:42

## 2020-06-16 RX ADMIN — TRAZODONE HYDROCHLORIDE 100 MG: 50 TABLET ORAL at 21:41

## 2020-06-16 RX ADMIN — CARBOXYMETHYLCELLULOSE SODIUM 2 DROP: 5 SOLUTION/ DROPS OPHTHALMIC at 16:02

## 2020-06-16 RX ADMIN — FOLIC ACID 1 MG: 1 TABLET ORAL at 09:50

## 2020-06-16 RX ADMIN — QUETIAPINE FUMARATE 50 MG: 50 TABLET ORAL at 21:46

## 2020-06-16 RX ADMIN — TAMSULOSIN HYDROCHLORIDE 0.4 MG: 0.4 CAPSULE ORAL at 20:47

## 2020-06-16 RX ADMIN — ONDANSETRON 4 MG: 2 INJECTION INTRAMUSCULAR; INTRAVENOUS at 17:47

## 2020-06-16 RX ADMIN — PANTOPRAZOLE SODIUM 40 MG: 40 TABLET, DELAYED RELEASE ORAL at 06:30

## 2020-06-16 RX ADMIN — VORTIOXETINE 10 MG: 10 TABLET, FILM COATED ORAL at 09:50

## 2020-06-16 RX ADMIN — MULTIPLE VITAMINS W/ MINERALS TAB 1 TABLET: TAB at 09:50

## 2020-06-16 RX ADMIN — MIRTAZAPINE 15 MG: 15 TABLET, FILM COATED ORAL at 21:41

## 2020-06-16 RX ADMIN — FLUTICASONE FUROATE AND VILANTEROL TRIFENATATE 1 PUFF: 200; 25 POWDER RESPIRATORY (INHALATION) at 09:53

## 2020-06-16 ASSESSMENT — ACTIVITIES OF DAILY LIVING (ADL)
ADLS_ACUITY_SCORE: 13

## 2020-06-16 NOTE — PLAN OF CARE
DATE & TIME: 6/15/20   Cognitive Concerns/ Orientation : A&O x4,    BEHAVIOR & AGGRESSION TOOL COLOR: Green  CIWA SCORE: 2,1  ABNL VS/O2: HR 56  MOBILITY: SBA 1/GB/refusing walker at times; walked muniz x 1; up in chair for meals  PAIN MANAGMENT: warm pack to lower back  DIET: Regular diet, 1500 ml fluid restriction started on previous shift  BOWEL/BLADDER: Continent.(urinal at the bedside) .  ABNL LAB/BG:  creat 1.76  DRAIN/DEVICES: RFA PIV SL  TELEMETRY RHYTHM: NA  SKIN: Scattered bruises   TESTS/PROCEDURES: Paracentesis today ,4300 ml removed, left sided abd dressing CDI  D/C DAY/GOALS/PLACE: awaiting intensive residential treatment  OTHER IMPORTANT INFO: followed by Renal, GI.  Zofran given  x1 for nausea with relief.

## 2020-06-16 NOTE — PROGRESS NOTES
SW:Discharge Planning  Writer placed a call to Omayra Angela at [phone] to ask if she has made calls to locate an IRTS facility.

## 2020-06-16 NOTE — PROGRESS NOTES
DATE & TIME: 6/15 night shift     Cognitive Concerns/ Orientation : A&Ox4   BEHAVIOR & AGGRESSION TOOL COLOR: green  CIWA SCORE: 0,0   ABNL VS/O2: VSS on RA   MOBILITY: SBA with walker, calling appropriately   PAIN MANAGMENT: denied pain shift, heat packs effective for lower back soreness   DIET: regular, 1500cc fluid restriction   BOWEL/BLADDER: continent, urinal at bedside.   ABNL LAB/BG: creat 1.76  DRAIN/DEVICES: PIV SL  TELEMETRY RHYTHM: na  SKIN: intact, scattered bruises   TESTS/PROCEDURES: paracentesis 6/15- 4300cc removed. Left abdominal dressing CDI  D/C DAY/GOALS/PLACE: waiting for intensive residential treatment   OTHER IMPORTANT INFO: Renal/GI following. Denied nausea on shift.   MD/RN ROUNDING SIGNED OFF D/E SHIFT: na  COMMIT TO SIT DONE AND SIGNED OFF yes

## 2020-06-16 NOTE — PROGRESS NOTES
[facility]    Nephrology Progress Note     Assessment & Plan     65-year-old male admitted via the emergency room, dated 06/10/2020-06/11/2020 in the setting of alcohol intoxication and suicidal ideation     Evaluated with respect to elevated serum creatinine        1.  Baseline CKD              -creatinine 1.4 mg/dl              -eGFR 40 to 50 ml/min              -US reviewed  2.  Proteinuria              -modest increased ACR  3.  ARF              -subacute component over preceding months              -recent further increase                          -Lona < 5; pre renal v HRS                          - new baseline cr 1.7-1.8 ?    4.  Anemia              -Fe deficient on IV Fe  5.  Alcoholic cirrhosis with portal HTN  6.  BPH and ? Chronic MARTINEZ     Plan:       Resume some diuretic  Spironolactone 25 mg and furosemide 20 mg daily.        Dez Mann MD  [facility] - Nephrology  [phone]    Interval History     Cr fairly stable after paracentesis today.  Cr 1.76 to 1.80  Pt denies f/c/n/v.  No cp/sob/cough.    No dysuria/hematuria/abd or flank pain.  No dizziness, lightheadedness, headaches, or focal neuro sx.      Physical Exam   Temp: 98.6  F (37  C) Temp src: Oral BP: 114/65   Heart Rate: 54 Resp: 16 SpO2: 97 % O2 Device: None (Room air)    Vitals:    06/10/20 0158   Weight: 86.2 kg (190 lb)     Vital Signs with Ranges  Temp:  [98.6  F (37  C)-99.1  F (37.3  C)] 98.6  F (37  C)  Heart Rate:  [54-74] 54  Resp:  [16-18] 16  BP: (114-145)/(55-79) 114/65  SpO2:  [94 %-97 %] 97 %  I/O last 3 completed shifts:  In: 1430 [P.O.:1430]  Out: -     GENERAL APPEARANCE: pleasant, NAD, a & o  HEENT:  Eyes/ears/nose/neck grossly normal  RESP: lungs cta b c good efforts, no crackles, rhonchi or wheezes  CV: RRR, nl S1/S2, no m/r/g   ABDOMEN: distended, soft, NT  EXTREMITIES/SKIN: no rashes/lesions; no edema    Medications       albumin human  12.5-50 g Intravenous Once      fluticasone-vilanterol  1 puff Inhalation Daily     folic acid  1 mg Oral Daily     furosemide  20 mg Oral Daily     mirtazapine  15 mg Oral At Bedtime     multivitamin w/minerals  1 tablet Oral Daily     pantoprazole  40 mg Oral BID AC     sodium chloride (PF)  3 mL Intracatheter Q8H     spironolactone  25 mg Oral Daily     tamsulosin  0.4 mg Oral QPM     traZODone  100 mg Oral At Bedtime     vortioxetine  10 mg Oral Daily       Data   BMP  Recent Labs   Lab 06/16/20  0639 06/15/20  0837 06/14/20 0745 06/13/20  0838    134 136 133   POTASSIUM 4.2 3.7 3.7 3.8   CHLORIDE 108 105 106 104   KEV 8.7 8.0* 8.1* 8.1*   CO2 24 23 22 24   BUN 27 26 26 28   CR 1.80* 1.76* 1.66* 1.79*   GLC 91 127* 126* 120*     Phos@LABNTIPR(phos:4)  CBC)  Recent Labs   Lab 06/14/20 0745 06/13/20  0838 06/12/20  0557 06/11/20  2226  06/10/20  0206   WBC  --   --   --   --   --  5.9   HGB 8.1* 7.9* 8.4* 8.5*   < > 9.6*   HCT  --   --   --   --   --  28.3*   MCV  --   --   --   --   --  89   PLT  --   --   --   --   --  161    < > = values in this interval not displayed.     Recent Labs   Lab 06/12/20 0557 06/10/20  0206   AST  --  89*   ALT  --  30   ALKPHOS  --  239*   BILITOTAL  --  1.0   ELPIDIO 44  --      Recent Labs   Lab 06/10/20  0206   INR 1.22*     No results found for: D2VIT, D3VIT, DTOT  Recent Labs   Lab 06/14/20 0745 06/13/20  0838  06/10/20  0206   HGB 8.1* 7.9*   < > 9.6*   HCT  --   --   --  28.3*   MCV  --   --   --  89   IRON  --  16*  --   --    IRONSAT  --  12*  --   --    FEB  --  138*  --   --    KATE  --  72  --   --     < > = values in this interval not displayed.     Recent Labs   Lab 06/13/20  0838   PTHI 34       Attestation:   I have reviewed today's relevant vital signs, notes, medications, labs and imaging.

## 2020-06-16 NOTE — PLAN OF CARE
Discharge Planner PT   Patient plan for discharge: Did not discuss  Current status: Pt performed sit<>stand Macy.  Gait training x 360ft with FWW SBA, and 120ft x1 with no AD and CGA. Pt with similar gait pattern both with and without AD; moderate lateral sway, wide [place] and R LE flat foot contact.   Barriers to return to prior living situation: LE weakness, decreased activity tolerance, impaired balance, fall risk.   Recommendations for discharge: Home with Home PT per plan established by the PT.   Rationale for recommendations: Patient is anticipated to continue progressing with IP PT services in order to demonstrate necessary level of functional mobility with use of FWW to return to home environment. Patient is recommended to continue with skilled HH-PT in order to improve LE strength, balance and activity tolerance in order to return to baseline mobility.  Pt appropriate for Home PT as they require AD, assistive person, and taxing effort to leave the home.       Entered by: She Porras 06/16/2020 3:01 PM

## 2020-06-16 NOTE — PROGRESS NOTES
[facility]  Gastroenterology Progress Note     Jim Richard MRN# [id number]   YOB: 1955 Age: 65 year old          Assessment and Plan:   Jim Richard is a pleasant 65 year old male with alcohol liver cirrhosis that presented with complaints of alcohol intoxication and melena.GI consulted for evaluation of melena.     Alcoholic cirrhosis with ascites  Nausea without vomiting  Renal insufficiency  Patient has known alcohol abuse and dependence and consumes 0.75 L of vodka daily. Has developed alcohol liver cirrhosis with ascites.   - 6/12 EGD noted Grade 1 esophageal varices.  - Hemoglobin stable at 8.1.   - 6/10 Paracentesis with 4L of fluid removed.   - 6/15 Paracentesis with 4 L of fluid removed.  Abdomen distended and patient has shortness of breath.. Hope to start diuretics soon- Creatinine elevated above baseline. (baseline 1.3-1.4) . Nephrology following. Patient has had exacerbation of symptoms associated with liver cirrhosis due to chronic intake of alcohol. His MELD score is 14. He appears comfortable and is tolerating alcohol withdrawal.     - Recommend daily labs  - Continue pantoprazole 40 mg BID            Alcoholic intoxication without complication (H)  Suicidal ideation  Hematemesis, presence of nausea not specified  Renal insufficiency      Interval History:   Patient states has no nausea. Denies shortness of breath, wheezing or coughing              Review of Systems:   C: NEGATIVE for fever, chills, change in weight  E/M: NEGATIVE for ear, mouth and throat problems  R: NEGATIVE for significant cough or SOB  CV: NEGATIVE for chest pain, palpitations or peripheral edema             Medications:   I have reviewed this patient's current medications    albumin human  12.5-50 g Intravenous Once     fluticasone-vilanterol  1 puff Inhalation Daily     folic acid  1 mg Oral Daily     mirtazapine  15 mg Oral At Bedtime     multivitamin w/minerals  1 tablet Oral Daily      pantoprazole  40 mg Oral BID AC     sodium chloride (PF)  3 mL Intracatheter Q8H     tamsulosin  0.4 mg Oral QPM     traZODone  100 mg Oral At Bedtime     vortioxetine  10 mg Oral Daily                  Physical Exam:   Vitals were reviewed  Vital Signs with Ranges  Temp:  [97.6  F (36.4  C)-99.1  F (37.3  C)] 98.6  F (37  C)  Heart Rate:  [56-74] 74  Resp:  [16-18] 18  BP: (121-145)/(55-79) 145/79  SpO2:  [94 %-96 %] 94 %  I/O last 3 completed shifts:  In: 1910 [P.O.:1910]  Out: -   Constitutional: healthy, alert and no distress   Cardiovascular: negative, PMI normal. No lifts, heaves, or thrills. RRR. No murmurs, clicks gallops or rub  Respiratory: negative, Percussion normal. Good diaphragmatic excursion. Lungs clear  Head: Normocephalic. No masses, lesions, tenderness or abnormalities  Neck: Neck supple. No adenopathy. Thyroid symmetric, normal size,, Carotids without bruits.  Abdomen: Abdomen soft, non-tender. BS normal. No masses, organomegaly, positive findings: distended           Data:   I reviewed the patient's new clinical lab test results.   Recent Labs   Lab Test 06/14/20  0745 06/13/20  0838 06/12/20  0557  06/10/20  0206 05/26/20  1215 05/14/20  2322   WBC  --   --   --   --  5.9 2.8* 7.0   HGB 8.1* 7.9* 8.4*   < > 9.6* 8.7* 9.5*   MCV  --   --   --   --  89 92 88   PLT  --   --   --   --  161 78* 205   INR  --   --   --   --  1.22* 1.30* 1.23*    < > = values in this interval not displayed.     Recent Labs   Lab Test 06/16/20  0639 06/15/20  0837 06/14/20  0745   POTASSIUM 4.2 3.7 3.7   CHLORIDE 108 105 106   CO2 24 23 22   BUN 27 26 26   ANIONGAP 6 6 8     Recent Labs   Lab Test 06/10/20  0206 05/26/20  1612 05/26/20  1215 05/14/20  2322  02/03/20  0218  01/11/20  1730  11/13/19  1858 11/13/19  1849   ALBUMIN 2.7*  --  2.9* 2.8*   < > 2.7*   < >  --    < > 3.0*  --    BILITOTAL 1.0  --  1.7* 0.7   < > 1.6*   < >  --    < > 1.0  --    ALT 30  --  29 26   < > 26   < >  --    < > 24  --    AST 89*   --  65* 45   < > 61*   < >  --    < > 58*  --    PROTEIN  --  30*  --   --   --   --   --  Negative  --   --  30*   LIPASE  --   --  346  --   --  369  --   --   --  499*  --     < > = values in this interval not displayed.       I reviewed the patient's new imaging results.    All laboratory data reviewed  All imaging studies reviewed by me.    Gloria De Leon PA-C,  6/16/2020  [facility]  Office : [phone]  Cell: [phone] (Dr. Shaw)  Cell: [phone] (Gloria De Leon PA-C)

## 2020-06-16 NOTE — PLAN OF CARE
DATE & TIME: 6/16/20 Day shift    Cognitive Concerns/ Orientation : A&Ox4   BEHAVIOR & AGGRESSION TOOL COLOR: GREEN  CIWA SCORE: 0,0   ABNL VS/O2: VSS on RA   MOBILITY: independent  PAIN MANAGMENT: denied pain shift  DIET: regular, 1500cc fluid restriction   BOWEL/BLADDER: continent, urinal at bedside. Had large BM today  ABNL LAB/BG: creat 1.80  DRAIN/DEVICES: PIV SL  TELEMETRY RHYTHM: NA  SKIN: intact, scattered bruises   TESTS/PROCEDURES: paracentesis 6/15- 4300cc removed. Left abdominal dressing CDI  D/C DAY/GOALS/PLACE: waiting for intensive residential treatment, SW following  OTHER IMPORTANT INFO: Renal/GI following. C/o of nausea on shift after breakfast, gave Zofran with relief.

## 2020-06-16 NOTE — PROGRESS NOTES
[facility]  Hospitalist Progress Note        When I evaluated patient: 06/16/2020        Interval History:      - chart reviewed, evaluated patient, reviewed with nursing staff.   - denies abdominal pain. Feels nauseous after breakfast. Had BM this morning, normal per patient.          Assessment and Plan:        Jim Richard is a 65 year old male with PMH significant for alcohol abuse, alcoholic cirrhosis with ascites, JANIS, depression and anxiety, history of G.I. bleed with esophageal varices who presented to ER with alcohol intoxication and suicidal ideation. While in ED he also reported some dark stools and admitted for further evaluation of G.I. bleed     #Likely G.I. bleed with history of esophagitis and esophageal varices  #chronic anemia and thrombocytopenia  - hemodynamically stable with no further melena  - baseline hemoglobin 8-9; hb 9.6---9.0----8.3---stabilizing around 8-8.5  - evaluated by GI, EGD 6/11 noted again with grade 1 esophageal varices, moderate portal gastropathy  - continue Protonix PO bid  - was on Rocephin IV for SBP prophylaxis, discontinued 6/13  - does have s/o iron deficiency; got IV iron per nephrology    #Acute on CKD  - baseline creatinine around 1.3-1.5  - creatinine on admission 1.7, likely prerenal secondary to alcohol abuse and poor PO intake; held off on PTA Lasix, Aldactone and started on cautious IV hydration but renal function worsened; IVF d/abhijeet on 6/11 evening  - Cr 1.72---1.75---1.89---1.79---1.66---1.76--1.80  - nephrology following, suggested no more diuretics (might need frequent paracentesis for ascites)  - renal US (6/12) UR except for some evidence of chronic urinary bladder outlet obstruction; got IV albumin per Nephrology  - monitor BMP     #Alcohol abuse/dependence  #alcoholic cirrhosis with ascites  #medication noncompliance  #Anxiety and depression  -had stopped taking all his medications recently PTA  -had ultrasound-guided paracentesis  "done in ED 5/10; 4 L removed  -continue to hold off on PTA Lasix and Aldactone for now given worsening renal function   -blood alcohol level on admission 0.32,   - continue Valium per CIWA protocol, not in withdrawal.  Tremors much improved--discontinued clonidine 6/14  - evaluated by psych, suggested to resume PTA antidepressants which he has been non-compliant with; continue PTA Remeron, Seroquel, trazodone, Trintellix  - apparently his commitment has been revoked,  for disposition planning-- working on Intensive Residential Treatment Services  -diuretics use currently limited with worsened renal function and soft BP  - s/p repeat paracentesis 6/15 and >4L removed, albumin was infused.      #BPH: continue Flomax     #COPD  -respiratory status stable continue; continue PTA inhalers     # DVT prophylaxis: mechanical with PCD boots  # Code status: full code    Disposition: unclear, pending placement, SW for disposition, per  'provisional discharge was revoked by [facility]. The court is ordering for Jim to be kept here until we can place him in a facility.  The county worker is making referrals to [facility]idential [facility]\"                     Physical Exam:      Blood pressure (!) 145/79, pulse 56, temperature 99.1  F (37.3  C), temperature source Oral, resp. rate 18, height 1.702 m (5' 7\"), weight 86.2 kg (190 lb), SpO2 94 %.  Vitals:    06/10/20 0158   Weight: 86.2 kg (190 lb)     Vital Signs with Ranges  Temp:  [97.6  F (36.4  C)-99.1  F (37.3  C)] 99.1  F (37.3  C)  Heart Rate:  [56-74] 74  Resp:  [16-18] 18  BP: (121-145)/(55-79) 145/79  SpO2:  [94 %-96 %] 94 %  I/O's Last 24 hours  I/O last 3 completed shifts:  In: 1910 [P.O.:1910]  Out: -     Constitutional: Alert, awake and orienetd X 3; lying comfortably in bed in no apparent distress   HEENT: Pupils equal and reactive to light and accomodation, EOMI intact; neck supple no raised JVD or rigidity    Oral cavity: Moist oral " mucosa   Cardiovascular: Normal s1 s2, regular rate and rhythm, no murmur   Lungs: B/l clear to auscultation, no wheezes or crepitations   Abdomen: Soft, distended, ascites +, non tender, no guarding, rigidity or rebound; BS +   LE : No edema   Musculoskeletal: Power 5/5 in all extremities    Neuro: No focal neurological deficits noted, CN II to XII grossly intact   Psychiatry: normal mood and affect  Skin: No obvious skin rashes or ulcers                  Medications:          albumin human  12.5-50 g Intravenous Once     fluticasone-vilanterol  1 puff Inhalation Daily     folic acid  1 mg Oral Daily     mirtazapine  15 mg Oral At Bedtime     multivitamin w/minerals  1 tablet Oral Daily     pantoprazole  40 mg Oral BID AC     sodium chloride (PF)  3 mL Intracatheter Q8H     tamsulosin  0.4 mg Oral QPM     traZODone  100 mg Oral At Bedtime     vortioxetine  10 mg Oral Daily     PRN Meds: artificial tears ophthalmic solution, diazepam **OR** diazepam, lidocaine 4%, lidocaine (buffered or not buffered), melatonin, naloxone, ondansetron **OR** ondansetron, prochlorperazine **OR** prochlorperazine **OR** prochlorperazine, QUEtiapine, QUEtiapine, sodium chloride (PF), sodium chloride (PF)         Data:      All new lab and imaging data was reviewed.   Recent Labs   Lab Test 06/14/20  0745 06/13/20  0838 06/12/20  0557  06/10/20  0206 05/26/20  1215 05/14/20  2322   WBC  --   --   --   --  5.9 2.8* 7.0   HGB 8.1* 7.9* 8.4*   < > 9.6* 8.7* 9.5*   MCV  --   --   --   --  89 92 88   PLT  --   --   --   --  161 78* 205   INR  --   --   --   --  1.22* 1.30* 1.23*    < > = values in this interval not displayed.      Recent Labs   Lab Test 06/16/20  0639 06/15/20  0837 06/14/20  0745    134 136   POTASSIUM 4.2 3.7 3.7   CHLORIDE 108 105 106   CO2 24 23 22   BUN 27 26 26   CR 1.80* 1.76* 1.66*   ANIONGAP 6 6 8   KEV 8.7 8.0* 8.1*   GLC 91 127* 126*     Recent Labs   Lab Test 06/10/20  0206 04/26/18 1940   TROPI 0.023  <0.015

## 2020-06-17 ENCOUNTER — APPOINTMENT (OUTPATIENT)
Dept: PHYSICAL THERAPY | Facility: CLINIC | Age: 65
DRG: 432 | End: 2020-06-17
Payer: MEDICARE

## 2020-06-17 LAB
ALBUMIN SERPL-MCNC: 3.2 G/DL (ref 3.4–5)
ANION GAP SERPL CALCULATED.3IONS-SCNC: 6 MMOL/L (ref 3–14)
BUN SERPL-MCNC: 34 MG/DL (ref 7–30)
CALCIUM SERPL-MCNC: 8.7 MG/DL (ref 8.5–10.1)
CHLORIDE SERPL-SCNC: 105 MMOL/L (ref 94–109)
CO2 SERPL-SCNC: 24 MMOL/L (ref 20–32)
CREAT SERPL-MCNC: 1.91 MG/DL (ref 0.66–1.25)
GFR SERPL CREATININE-BSD FRML MDRD: 36 ML/MIN/{1.73_M2}
GLUCOSE SERPL-MCNC: 109 MG/DL (ref 70–99)
PHOSPHATE SERPL-MCNC: 3.9 MG/DL (ref 2.5–4.5)
POTASSIUM SERPL-SCNC: 4 MMOL/L (ref 3.4–5.3)
SODIUM SERPL-SCNC: 135 MMOL/L (ref 133–144)

## 2020-06-17 PROCEDURE — 25000132 ZZH RX MED GY IP 250 OP 250 PS 637: Mod: GY | Performed by: INTERNAL MEDICINE

## 2020-06-17 PROCEDURE — 97112 NEUROMUSCULAR REEDUCATION: CPT | Mod: GP

## 2020-06-17 PROCEDURE — 80069 RENAL FUNCTION PANEL: CPT | Performed by: INTERNAL MEDICINE

## 2020-06-17 PROCEDURE — 36415 COLL VENOUS BLD VENIPUNCTURE: CPT | Performed by: INTERNAL MEDICINE

## 2020-06-17 PROCEDURE — 99232 SBSQ HOSP IP/OBS MODERATE 35: CPT | Performed by: HOSPITALIST

## 2020-06-17 PROCEDURE — 97116 GAIT TRAINING THERAPY: CPT | Mod: GP

## 2020-06-17 PROCEDURE — 12000000 ZZH R&B MED SURG/OB

## 2020-06-17 PROCEDURE — 25000132 ZZH RX MED GY IP 250 OP 250 PS 637: Mod: GY | Performed by: HOSPITALIST

## 2020-06-17 RX ORDER — HYDROXYZINE HYDROCHLORIDE 25 MG/1
25-50 TABLET, FILM COATED ORAL 3 TIMES DAILY PRN
Status: DISCONTINUED | OUTPATIENT
Start: 2020-06-17 | End: 2020-06-18

## 2020-06-17 RX ORDER — HYDROXYZINE HYDROCHLORIDE 25 MG/1
25 TABLET, FILM COATED ORAL 3 TIMES DAILY PRN
Status: DISCONTINUED | OUTPATIENT
Start: 2020-06-17 | End: 2020-06-17

## 2020-06-17 RX ADMIN — PANTOPRAZOLE SODIUM 40 MG: 40 TABLET, DELAYED RELEASE ORAL at 06:37

## 2020-06-17 RX ADMIN — PANTOPRAZOLE SODIUM 40 MG: 40 TABLET, DELAYED RELEASE ORAL at 16:12

## 2020-06-17 RX ADMIN — QUETIAPINE FUMARATE 50 MG: 50 TABLET ORAL at 22:05

## 2020-06-17 RX ADMIN — QUETIAPINE 50 MG: 25 TABLET ORAL at 16:46

## 2020-06-17 RX ADMIN — CARBOXYMETHYLCELLULOSE SODIUM 2 DROP: 5 SOLUTION/ DROPS OPHTHALMIC at 08:42

## 2020-06-17 RX ADMIN — CARBOXYMETHYLCELLULOSE SODIUM 2 DROP: 5 SOLUTION/ DROPS OPHTHALMIC at 16:48

## 2020-06-17 RX ADMIN — HYDROXYZINE HYDROCHLORIDE 25 MG: 25 TABLET ORAL at 21:16

## 2020-06-17 RX ADMIN — VORTIOXETINE 10 MG: 10 TABLET, FILM COATED ORAL at 08:41

## 2020-06-17 RX ADMIN — MIRTAZAPINE 15 MG: 15 TABLET, FILM COATED ORAL at 21:12

## 2020-06-17 RX ADMIN — FOLIC ACID 1 MG: 1 TABLET ORAL at 08:42

## 2020-06-17 RX ADMIN — CARBOXYMETHYLCELLULOSE SODIUM 2 DROP: 5 SOLUTION/ DROPS OPHTHALMIC at 06:40

## 2020-06-17 RX ADMIN — SPIRONOLACTONE 25 MG: 25 TABLET ORAL at 08:42

## 2020-06-17 RX ADMIN — TAMSULOSIN HYDROCHLORIDE 0.4 MG: 0.4 CAPSULE ORAL at 19:44

## 2020-06-17 RX ADMIN — TRAZODONE HYDROCHLORIDE 100 MG: 50 TABLET ORAL at 21:12

## 2020-06-17 RX ADMIN — FLUTICASONE FUROATE AND VILANTEROL TRIFENATATE 1 PUFF: 200; 25 POWDER RESPIRATORY (INHALATION) at 08:42

## 2020-06-17 RX ADMIN — MULTIPLE VITAMINS W/ MINERALS TAB 1 TABLET: TAB at 08:42

## 2020-06-17 RX ADMIN — FUROSEMIDE 20 MG: 20 TABLET ORAL at 08:42

## 2020-06-17 RX ADMIN — HYDROXYZINE HYDROCHLORIDE 25 MG: 25 TABLET ORAL at 12:17

## 2020-06-17 RX ADMIN — CARBOXYMETHYLCELLULOSE SODIUM 2 DROP: 5 SOLUTION/ DROPS OPHTHALMIC at 12:17

## 2020-06-17 ASSESSMENT — ACTIVITIES OF DAILY LIVING (ADL)
ADLS_ACUITY_SCORE: 11
ADLS_ACUITY_SCORE: 13
ADLS_ACUITY_SCORE: 11
ADLS_ACUITY_SCORE: 13
ADLS_ACUITY_SCORE: 13
ADLS_ACUITY_SCORE: 11

## 2020-06-17 NOTE — PLAN OF CARE
DATE: 6/17/20 Day shift  Cognitive Concerns/ Orientation : A&Ox4, calm and cooperative   BEHAVIOR & AGGRESSION TOOL COLOR: GREEN  CIWA SCORE: 2 - mild tremors   ABNL VS/O2: VSS on RA  MOBILITY: Independent, steady gait on shift. Ambulated in hallway multiple times  PAIN MANAGMENT: denied pain on shift,  DIET: Regular, 1500cc restriction   BOWEL/BLADDER: Continent of B/B- no BM on shift. Adequate urine output  ABNL LAB/BG: Creat 1.91  DRAIN/DEVICES: NA, infiltrated  TELEMETRY RHYTHM: na  SKIN:Intact, scattered bruises  TESTS/PROCEDURES: Paracentesis 6/15- 4300cc removed. Left abdominal dressing CDI  D/C DAY/GOALS/PLACE: Waiting for intensive residential treatment, SW following. Pt compliant with plan. Complaining of worsening distention and SOB. Paracentesis in near future per MD.   OTHER IMPORTANT INFO: Renal/GI following. Pt calling appropriately. Continue to monitor.   Patient complaining of itchiness and dryness of eye, eye drops given repeatedly.

## 2020-06-17 NOTE — PROGRESS NOTES
"  [facility]  Hospitalist Progress Note        When I evaluated patient: 06/17/2020        Interval History:      -Patient reports his abdomen is \" feeling up\" again.  Denies pain.  No nausea today.   -Also reports itchy eyes.  No pain or blurry vision.  Feels it is dry.    -Rquesting sleep aid.         Assessment and Plan:        Jim Richard is a 65 year old male with PMH significant for alcohol abuse, alcoholic cirrhosis with ascites, JANIS, depression and anxiety, history of G.I. bleed with esophageal varices who presented to ER with alcohol intoxication and suicidal ideation. While in ED he also reported some dark stools and admitted for further evaluation of G.I. bleed     #Likely G.I. bleed with history of esophagitis and esophageal varices  #chronic anemia and thrombocytopenia  - hemodynamically stable with no further melena  - baseline hemoglobin 8-9; hb 9.6---9.0----8.3---stabilized around 8-8.5  - evaluated by GI, EGD 6/11 noted again with grade 1 esophageal varices, moderate portal gastropathy  - continue Protonix PO bid  - was on Rocephin IV for SBP prophylaxis, discontinued 6/13  - does have s/o iron deficiency; got IV iron per nephrology    #Acute on CKD: baseline creatinine around 1.3-1.5  - creatinine on admission 1.7, likely prerenal secondary to alcohol abuse and poor PO intake; held off on PTA Lasix, Aldactone and started on cautious IV hydration but renal function worsened; IVF d/abhijeet on 6/11 evening  - Cr 1.72---1.75---1.89---1.79---1.66---1.76--1.80--1.91  - nephrology following, Lasix and Aldactone resumed 6/16 at low dose, noted slight increase in creatinine, follow BMP in a.m. prior to next dose and reassess.  Appreciate nephro consult.  - renal US (6/12) UR except for some evidence of chronic urinary bladder outlet obstruction; got IV albumin per Nephrology  - daily BMP     #Alcohol abuse/dependence  #alcoholic cirrhosis with ascites  #medication noncompliance  #Anxiety " "and depression  Had stopped taking all his medications recently PTA. Blood alcohol level on admission 0.32. PTA Lasix and Aldactone held on admission given worsening renal function.   -was placed on Valium per [facility] protocol, not in withdrawal. Tremors much improved,  clonidine discontinued   - evaluated by psych, suggested to resume PTA antidepressants which he has been non-compliant with. PTA Remeron, Seroquel, trazodone, Trintellix resumed.   - apparently his commitment has been revoked,  for disposition planning-- working on Intensive Residential Treatment Services  -diuretic as above  - s/p repeat paracentesis 6/15 and >4L removed, albumin was infused.      #BPH: continue Flomax     #COPD  -respiratory status stable continue; continue PTA inhalers     #Itchy eyes: no redness or blurry vision.   -topical eye drops PRN and atarax.     # DVT prophylaxis: mechanical with PCD boots  # Code status: full code    Disposition: unclear, pending placement, SW for disposition, per  'provisional discharge was revoked by [facility]. The court is ordering for Jim to be kept here until we can place him in a facility.  The county worker is making referrals to Intensive [facility]idential [facility]\"                     Physical Exam:      Blood pressure 108/51, pulse 68, temperature 98.6  F (37  C), temperature source Oral, resp. rate 16, height 1.702 m (5' 7\"), weight 86.2 kg (190 lb), SpO2 96 %.  Vitals:    06/10/20 0158   Weight: 86.2 kg (190 lb)     Vital Signs with Ranges  Temp:  [97.6  F (36.4  C)-98.6  F (37  C)] 98.6  F (37  C)  Pulse:  [68] 68  Heart Rate:  [54-65] 65  Resp:  [16] 16  BP: (108-114)/(51-65) 108/51  SpO2:  [96 %-98 %] 96 %  I/O's Last 24 hours  I/O last 3 completed shifts:  In: 240 [P.O.:240]  Out: -     Constitutional: Alert, awake and orienetd X 3; lying comfortably in bed in no apparent distress   HEENT: Pupils equal and reactive to light and accomodation, EOMI intact; neck supple " no raised JVD or rigidity    Oral cavity: Moist oral mucosa   Cardiovascular: Normal s1 s2, regular rate and rhythm, no murmur   Lungs: B/l clear to auscultation, no wheezes or crepitations   Abdomen: Distended with ascites but soft and  non tender, no guarding, rigidity or rebound; BS +   LE : No edema   Musculoskeletal: Power 5/5 in all extremities    Neuro: No focal neurological deficits noted, CN II to XII grossly intact   Psychiatry: normal mood and affect  Skin: No obvious skin rashes or ulcers                  Medications:          albumin human  12.5-50 g Intravenous Once     fluticasone-vilanterol  1 puff Inhalation Daily     folic acid  1 mg Oral Daily     furosemide  20 mg Oral Daily     mirtazapine  15 mg Oral At Bedtime     multivitamin w/minerals  1 tablet Oral Daily     pantoprazole  40 mg Oral BID AC     sodium chloride (PF)  3 mL Intracatheter Q8H     spironolactone  25 mg Oral Daily     tamsulosin  0.4 mg Oral QPM     traZODone  100 mg Oral At Bedtime     vortioxetine  10 mg Oral Daily     PRN Meds: artificial tears ophthalmic solution, diazepam **OR** diazepam, hydrOXYzine, lidocaine 4%, lidocaine (buffered or not buffered), melatonin, naloxone, ondansetron **OR** ondansetron, prochlorperazine **OR** prochlorperazine **OR** prochlorperazine, QUEtiapine, QUEtiapine, sodium chloride (PF), sodium chloride (PF)         Data:      All new lab and imaging data was reviewed.   Recent Labs   Lab Test 06/14/20  0745 06/13/20  0838 06/12/20  0557  06/10/20  0206 05/26/20  1215 05/14/20  2322   WBC  --   --   --   --  5.9 2.8* 7.0   HGB 8.1* 7.9* 8.4*   < > 9.6* 8.7* 9.5*   MCV  --   --   --   --  89 92 88   PLT  --   --   --   --  161 78* 205   INR  --   --   --   --  1.22* 1.30* 1.23*    < > = values in this interval not displayed.      Recent Labs   Lab Test 06/17/20  0849 06/16/20  0639 06/15/20  0837    138 134   POTASSIUM 4.0 4.2 3.7   CHLORIDE 105 108 105   CO2 24 24 23   BUN 34* 27 26   CR  1.91* 1.80* 1.76*   ANIONGAP 6 6 6   KEV 8.7 8.7 8.0*   * 91 127*     Recent Labs   Lab Test 06/10/20  0206 04/26/18 1940   TROPI 0.023 <0.015

## 2020-06-17 NOTE — PLAN OF CARE
Discharge Planner PT   Patient plan for discharge: Did not discuss   Current status: Greeted patient in hallway asking nursing for a soda. Patient IND with mobility in room. Engaged patient in ambulation without assistive device at A for a total distance of 225ft with cues for technique. Engaged patient in ascending/descending 2 flights of steps with single railing at [facility], with patient experiencing 1 LOB from catching toes on step which patient is able to self-correct. Engaged patient in standing balance. Concluded session with patient sitting up in chair.   Barriers to return to prior living situation: impaired balance, fall rsik  Recommendations for discharge: Home with OP-PT  Rationale for recommendations: Patient demonstrates necessary level of functional mobility in order to return to home environment. Patient is recommended to continue with skilled OP-PT in order to improve dynamic balance and activity tolerance in order to return to baseline mobility.         Entered by: Padmini Noel 06/17/2020 11:21 AM

## 2020-06-17 NOTE — PROGRESS NOTES
"BRIEF NUTRITION ASSESSMENT    REASON FOR ASSESSMENT:  Jim Richard is a 65 year old male seen by Registered Dietitian for LOS    NUTRITION HISTORY:  - Patient is known from prior admissions. Historically he eats well after initial admission and symptom-management period. Unable to reach this morning by phone.   - H/o alcohol abuse, alcoholic cirrhosis w/ ascites, JANIS, depression, anxiety, h/o GIB with esophageal varices.   - Presented with alcohol intoxication and suicidal ideation. Had been having difficulty eating due to distention.   - Has typically been restricted to 2g NA diet during prior admissions.   - NKFA    CURRENT DIET AND INTAKE:  Diet: Regular     Noted RN documentation that pt is on a \"1500 mL fluid restriction\" - This order is not currently in place. (Ordered today by RD per staff rec)  - Ordering adequate meals TID when able. Per Meal Review, pt has been ordering 6590-1029 kcal and  g pro daily over the past 3 days with % intakes for most meals, occasionally only consumes 50%.     ANTHROPOMETRICS:  Height: 5' 7\"  Weight:  190 lbs 0 oz (86.2 kg) - taken 6/10 (suspect reported body weight)  Body mass index is 29.76 kg/m .   Weight Status: Overweight BMI 25-29.9  IBW:  67.3 kg  %IBW: 128%  Weight History:   Wt Readings from Last 10 Encounters:   06/10/20 86.2 kg (190 lb)   05/26/20 86.2 kg (190 lb)   05/03/20 84.1 kg (185 lb 6.4 oz)   04/09/20 82.1 kg (181 lb)   02/25/20 82.3 kg (181 lb 8 oz)   01/16/20 83.5 kg (184 lb 1.4 oz)   12/24/19 88.5 kg (195 lb)   11/19/19 77.1 kg (170 lb)   11/13/19 77.1 kg (170 lb)   10/30/19 77.8 kg (171 lb 9.6 oz)       LABS/MEDS/PHYSICAL FINDINGS:  Reviewed    Folic Acid 1 mg  Lasix  Remeron 15 mg   Thera vit M daily     Paracentesis 6/10 and 6/15 - 4L off each time  EGD on 6/12 - Esophageal varices     MALNUTRITION:  Visual Nutrition Focused Physical Assessment (NFPA) not completed due to restrictions on face-to-face patient care during COVID-19 " Pandemic. Do not suspect muscle/fat losses.  Patient does not meet two of the criteria necessary for diagnosing malnutrition.     NUTRITION INTERVENTION:  Nutrition Diagnosis:  No nutrition diagnosis at this time.    Implementation:  Nutrition Education: Per Provider order if indicated  - Noted recommended 1500 mL FR, not currently ordered. Added to diet restriction.     FOLLOW UP/MONITORING:   Will re-evaluate in 7 - 10 days, or sooner, if re-consulted.    Summer Isaac RD, LD  Pager: [phone]

## 2020-06-17 NOTE — PROGRESS NOTES
Assessment and Plan:   CKD-3: Now NGUYEN. Lona < 5. Pre-renal vs HRS. Labs today K 4.0, HCO3 24, Cr 1.91. 6/14 Lona 19. Cr worse over last 4 days. Now on lasix and aldactone po.    Cr worsening. Cont diuretics for now to relieve ascites and edema but may need to stop if renal function worsens.               Interval History:   Alcohol intoxication.  Liver disease: cirrhosis, ascites.   BPH: on flomax.              Review of Systems:   No SOB or chest pain. Abd distended but no pain. Some nausea but eating ok. Ambulating well.           Medications:       albumin human  12.5-50 g Intravenous Once     fluticasone-vilanterol  1 puff Inhalation Daily     folic acid  1 mg Oral Daily     furosemide  20 mg Oral Daily     mirtazapine  15 mg Oral At Bedtime     multivitamin w/minerals  1 tablet Oral Daily     pantoprazole  40 mg Oral BID AC     sodium chloride (PF)  3 mL Intracatheter Q8H     spironolactone  25 mg Oral Daily     tamsulosin  0.4 mg Oral QPM     traZODone  100 mg Oral At Bedtime     vortioxetine  10 mg Oral Daily         Current active medications and PTA medications reviewed, see medication list for details.            Physical Exam:   Vitals were reviewed  Patient Vitals for the past 24 hrs:   BP Temp Temp src Pulse Heart Rate Resp SpO2   20 0805 108/51 98.6  F (37  C) Oral 68 65 16 96 %   20 2335 108/56 97.6  F (36.4  C) Oral -- 56 16 98 %   20 1519 114/65 98.6  F (37  C) Oral -- 54 16 97 %       Temp:  [97.6  F (36.4  C)-98.6  F (37  C)] 98.6  F (37  C)  Pulse:  [68] 68  Heart Rate:  [54-65] 65  Resp:  [16] 16  BP: (108-114)/(51-65) 108/51  SpO2:  [96 %-98 %] 96 %    Temperatures:  Current - Temp: 98.6  F (37  C); Max - Temp  Av.3  F (36.8  C)  Min: 97.6  F (36.4  C)  Max: 98.6  F (37  C)  Respiration range: Resp  Av  Min: 16  Max: 16  Pulse range: Pulse  Av  Min: 68  Max: 68  Blood pressure range: Systolic (24hrs), Av , Min:108 , Max:114   ; Diastolic (24hrs),  Av, Min:51, Max:65    Pulse oximetry range: SpO2  Av %  Min: 96 %  Max: 98 %    I/O last 3 completed shifts:  In: 240 [P.O.:240]  Out: -       Intake/Output Summary (Last 24 hours) at 2020 1335  Last data filed at 2020 0805  Gross per 24 hour   Intake 480 ml   Output --   Net 480 ml       Alert and responsive  Lungs with clear BS  Cor RRR nl S1 S2  sys M  LE 1+ edema  abd distended, nl BS, non-tender       Wt Readings from Last 4 Encounters:   06/10/20 86.2 kg (190 lb)   20 86.2 kg (190 lb)   20 84.1 kg (185 lb 6.4 oz)   20 82.1 kg (181 lb)          Data:          Lab Results   Component Value Date     2020     2020     06/15/2020    Lab Results   Component Value Date    CHLORIDE 105 2020    CHLORIDE 108 2020    CHLORIDE 105 06/15/2020    Lab Results   Component Value Date    BUN 34 2020    BUN 27 2020    BUN 26 06/15/2020      Lab Results   Component Value Date    POTASSIUM 4.0 2020    POTASSIUM 4.2 2020    POTASSIUM 3.7 06/15/2020      Lab Results   Component Value Date    CO2 24 2020    CO2 24 2020    CO2 23 06/15/2020    Lab Results   Component Value Date    CR 1.91 2020    CR 1.80 2020    CR 1.76 06/15/2020        Recent Labs   Lab Test 20  0745 20  0838 20  0557  06/10/20  0206 20  1215 20  2322   WBC  --   --   --   --  5.9 2.8* 7.0   HGB 8.1* 7.9* 8.4*   < > 9.6* 8.7* 9.5*   HCT  --   --   --   --  28.3* 26.8* 28.2*   MCV  --   --   --   --  89 92 88   PLT  --   --   --   --  161 78* 205    < > = values in this interval not displayed.     Recent Labs   Lab Test 20  0557 06/10/20  0206 20  1215 20  2322 20  0738  14  0722   AST  --  89* 65* 45  --    < >  --    ALT  --  30 29 26  --    < >  --    GGT  --   --   --   --   --   --  500*   ALKPHOS  --  239* 221* 210*  --    < >  --    BILITOTAL  --  1.0 1.7* 0.7  --    < >   --    ELPIDIO 44  --  <10*  --  <10*   < >  --     < > = values in this interval not displayed.       Recent Labs   Lab Test 02/10/20  0728 02/09/20  0810 02/08/20  0735   MAG 1.7 1.7 1.6     Recent Labs   Lab Test 06/17/20  0849 02/05/20  1148 01/10/20  0807   PHOS 3.9 2.5 2.9     Recent Labs   Lab Test 06/17/20  0849 06/16/20  0639 06/15/20  0837   KEV 8.7 8.7 8.0*       Lab Results   Component Value Date    KEV 8.7 06/17/2020     Lab Results   Component Value Date    WBC 5.9 06/10/2020    HGB 8.1 (L) 06/14/2020    HCT 28.3 (L) 06/10/2020    MCV 89 06/10/2020     06/10/2020     Lab Results   Component Value Date     06/17/2020    POTASSIUM 4.0 06/17/2020    CHLORIDE 105 06/17/2020    CO2 24 06/17/2020     (H) 06/17/2020     Lab Results   Component Value Date    BUN 34 (H) 06/17/2020    CR 1.91 (H) 06/17/2020     Lab Results   Component Value Date    MAG 1.7 02/10/2020     Lab Results   Component Value Date    PHOS 3.9 06/17/2020       Creatinine   Date Value Ref Range Status   06/17/2020 1.91 (H) 0.66 - 1.25 mg/dL Final   06/16/2020 1.80 (H) 0.66 - 1.25 mg/dL Final   06/15/2020 1.76 (H) 0.66 - 1.25 mg/dL Final   06/14/2020 1.66 (H) 0.66 - 1.25 mg/dL Final   06/13/2020 1.79 (H) 0.66 - 1.25 mg/dL Final   06/12/2020 1.89 (H) 0.66 - 1.25 mg/dL Final       Attestation:  I have reviewed today's vital signs, notes, medications, labs and imaging.     Arnaldo Frankel MD

## 2020-06-17 NOTE — PLAN OF CARE
"Cognitive Concerns/ Orientation : A&Ox4, calm and cooperative   BEHAVIOR & AGGRESSION TOOL COLOR: GREEN  CIWA SCORE: 1, 3 for hand tremors   ABNL VS/O2: VSS on RA   MOBILITY: Independent, ambulated in room and halls with walker. Steady and calls if needed.   PAIN MANAGMENT: Some abdominal fullness and discomfort after eating. Zofran IV given x1  DIET: Regular,good appetite, 1500cc fluid restriction   BOWEL/BLADDER: Continent with no c/o difficulty voiding. Diuretics restarted. Pt reports BM this evening. Using bathroom.   ABNL LAB/BG: Creat 1.80  DRAIN/DEVICES: PIV SL in R hand, positional   TELEMETRY RHYTHM: NA  SKIN:Intact, scattered bruises, showered this evening.   TESTS/PROCEDURES: Paracentesis 6/15- 4300cc removed. Left abdominal dressing CDI  D/C DAY/GOALS/PLACE: Waiting for intensive residential treatment, SW following. Pt compliant with plan.   OTHER IMPORTANT INFO: Renal/GI following. Pt independent and calling as needed. Showered this evening. Some abdominal \"fullness\" after dinner. Walks freq in room and halls.   "

## 2020-06-17 NOTE — PROGRESS NOTES
Cognitive Concerns/ Orientation : A&Ox4, calm and cooperative   BEHAVIOR & AGGRESSION TOOL COLOR: GREEN  CIWA SCORE: 2,2 - mild tremors   ABNL VS/O2: VSS on RA  MOBILITY: Independent, steady gait on shift. Ambulated to bathroom x 1   PAIN MANAGMENT: denied pain on shift, slept comfortably throughout   DIET: Regular, 1500cc restriction   BOWEL/BLADDER: Continent of B/B- no BM on shift. Adequate urine output  ABNL LAB/BG: Creat 1.80  DRAIN/DEVICES: PIV SL in R hand  TELEMETRY RHYTHM: na  SKIN:Intact, scattered bruises  TESTS/PROCEDURES: Paracentesis 6/15- 4300cc removed. Left abdominal dressing CDI  D/C DAY/GOALS/PLACE: Waiting for intensive residential treatment, SW following. Pt compliant with plan.   OTHER IMPORTANT INFO: Renal/GI following. Pt calling appropriately. Continue to monitor.

## 2020-06-18 ENCOUNTER — APPOINTMENT (OUTPATIENT)
Dept: PHYSICAL THERAPY | Facility: CLINIC | Age: 65
DRG: 432 | End: 2020-06-18
Payer: MEDICARE

## 2020-06-18 LAB
ALBUMIN SERPL-MCNC: 2.9 G/DL (ref 3.4–5)
ALP SERPL-CCNC: 207 U/L (ref 40–150)
ALT SERPL W P-5'-P-CCNC: 40 U/L (ref 0–70)
ANION GAP SERPL CALCULATED.3IONS-SCNC: 2 MMOL/L (ref 3–14)
AST SERPL W P-5'-P-CCNC: 47 U/L (ref 0–45)
BILIRUB DIRECT SERPL-MCNC: 0.3 MG/DL (ref 0–0.2)
BILIRUB SERPL-MCNC: 0.6 MG/DL (ref 0.2–1.3)
BUN SERPL-MCNC: 36 MG/DL (ref 7–30)
CALCIUM SERPL-MCNC: 8.7 MG/DL (ref 8.5–10.1)
CHLORIDE SERPL-SCNC: 108 MMOL/L (ref 94–109)
CO2 SERPL-SCNC: 26 MMOL/L (ref 20–32)
CREAT SERPL-MCNC: 1.96 MG/DL (ref 0.66–1.25)
GFR SERPL CREATININE-BSD FRML MDRD: 35 ML/MIN/{1.73_M2}
GLUCOSE SERPL-MCNC: 80 MG/DL (ref 70–99)
HCT VFR BLD AUTO: 26.8 % (ref 40–53)
HGB BLD-MCNC: 8.8 G/DL (ref 13.3–17.7)
MAGNESIUM SERPL-MCNC: 1.6 MG/DL (ref 1.6–2.3)
PHOSPHATE SERPL-MCNC: 4.1 MG/DL (ref 2.5–4.5)
PLATELET # BLD AUTO: 125 10E9/L (ref 150–450)
POTASSIUM SERPL-SCNC: 4.2 MMOL/L (ref 3.4–5.3)
PROT SERPL-MCNC: 6.3 G/DL (ref 6.8–8.8)
SODIUM SERPL-SCNC: 136 MMOL/L (ref 133–144)

## 2020-06-18 PROCEDURE — 12000000 ZZH R&B MED SURG/OB

## 2020-06-18 PROCEDURE — 25000132 ZZH RX MED GY IP 250 OP 250 PS 637: Mod: GY | Performed by: HOSPITALIST

## 2020-06-18 PROCEDURE — 80076 HEPATIC FUNCTION PANEL: CPT | Performed by: HOSPITALIST

## 2020-06-18 PROCEDURE — 25000132 ZZH RX MED GY IP 250 OP 250 PS 637: Mod: GY | Performed by: INTERNAL MEDICINE

## 2020-06-18 PROCEDURE — 97116 GAIT TRAINING THERAPY: CPT | Mod: GP

## 2020-06-18 PROCEDURE — 85027 COMPLETE CBC AUTOMATED: CPT | Performed by: HOSPITALIST

## 2020-06-18 PROCEDURE — 36415 COLL VENOUS BLD VENIPUNCTURE: CPT | Performed by: HOSPITALIST

## 2020-06-18 PROCEDURE — 99232 SBSQ HOSP IP/OBS MODERATE 35: CPT | Performed by: PSYCHIATRY & NEUROLOGY

## 2020-06-18 PROCEDURE — 97112 NEUROMUSCULAR REEDUCATION: CPT | Mod: GP

## 2020-06-18 PROCEDURE — 83735 ASSAY OF MAGNESIUM: CPT | Performed by: HOSPITALIST

## 2020-06-18 PROCEDURE — 80048 BASIC METABOLIC PNL TOTAL CA: CPT | Performed by: HOSPITALIST

## 2020-06-18 PROCEDURE — 99232 SBSQ HOSP IP/OBS MODERATE 35: CPT | Performed by: HOSPITALIST

## 2020-06-18 PROCEDURE — 25000132 ZZH RX MED GY IP 250 OP 250 PS 637: Mod: GY | Performed by: PSYCHIATRY & NEUROLOGY

## 2020-06-18 PROCEDURE — 84100 ASSAY OF PHOSPHORUS: CPT | Performed by: HOSPITALIST

## 2020-06-18 RX ORDER — HYDROXYZINE HYDROCHLORIDE 50 MG/1
100 TABLET, FILM COATED ORAL 3 TIMES DAILY
Status: DISCONTINUED | OUTPATIENT
Start: 2020-06-18 | End: 2020-07-02 | Stop reason: HOSPADM

## 2020-06-18 RX ORDER — MIRTAZAPINE 15 MG/1
30 TABLET, FILM COATED ORAL AT BEDTIME
Status: DISCONTINUED | OUTPATIENT
Start: 2020-06-18 | End: 2020-07-02 | Stop reason: HOSPADM

## 2020-06-18 RX ORDER — QUETIAPINE FUMARATE 50 MG/1
50-100 TABLET, FILM COATED ORAL EVERY 6 HOURS PRN
Status: DISCONTINUED | OUTPATIENT
Start: 2020-06-18 | End: 2020-07-02 | Stop reason: HOSPADM

## 2020-06-18 RX ADMIN — MIRTAZAPINE 30 MG: 15 TABLET, FILM COATED ORAL at 21:47

## 2020-06-18 RX ADMIN — TAMSULOSIN HYDROCHLORIDE 0.4 MG: 0.4 CAPSULE ORAL at 19:48

## 2020-06-18 RX ADMIN — FOLIC ACID 1 MG: 1 TABLET ORAL at 07:40

## 2020-06-18 RX ADMIN — PANTOPRAZOLE SODIUM 40 MG: 40 TABLET, DELAYED RELEASE ORAL at 16:36

## 2020-06-18 RX ADMIN — MULTIPLE VITAMINS W/ MINERALS TAB 1 TABLET: TAB at 07:40

## 2020-06-18 RX ADMIN — VORTIOXETINE 10 MG: 10 TABLET, FILM COATED ORAL at 07:40

## 2020-06-18 RX ADMIN — HYDROXYZINE HYDROCHLORIDE 100 MG: 50 TABLET, FILM COATED ORAL at 16:36

## 2020-06-18 RX ADMIN — SPIRONOLACTONE 25 MG: 25 TABLET ORAL at 07:39

## 2020-06-18 RX ADMIN — FLUTICASONE FUROATE AND VILANTEROL TRIFENATATE 1 PUFF: 200; 25 POWDER RESPIRATORY (INHALATION) at 07:43

## 2020-06-18 RX ADMIN — TRAZODONE HYDROCHLORIDE 100 MG: 50 TABLET ORAL at 21:47

## 2020-06-18 RX ADMIN — FUROSEMIDE 20 MG: 20 TABLET ORAL at 07:39

## 2020-06-18 RX ADMIN — PANTOPRAZOLE SODIUM 40 MG: 40 TABLET, DELAYED RELEASE ORAL at 06:39

## 2020-06-18 RX ADMIN — HYDROXYZINE HYDROCHLORIDE 100 MG: 50 TABLET, FILM COATED ORAL at 21:47

## 2020-06-18 RX ADMIN — CARBOXYMETHYLCELLULOSE SODIUM 2 DROP: 5 SOLUTION/ DROPS OPHTHALMIC at 07:43

## 2020-06-18 ASSESSMENT — ACTIVITIES OF DAILY LIVING (ADL)
ADLS_ACUITY_SCORE: 11

## 2020-06-18 NOTE — PROGRESS NOTES
SW:  Patient's [facility]  has made a referral to [facility] which is a [facility] Intensive Residential Treatment Service program which includes housing.   Writer faxed clinical information to Pricilla at [facility].  Omayra Nieves is working on funding for the placement. Patient does not qualify for MA however may be eligible for [facility] funding under the Court Commitment order.  Both Omayra and Pricilla are out of the office thru next Monday. So we will not know if [facility] is able to accept patient till early next week.  Interim discharge to detox awaiting placement is not feasible given patient's medical complications.

## 2020-06-18 NOTE — PROGRESS NOTES
[facility]  Hospitalist Progress Note        When I evaluated patient: 06/18/2020        Interval History:      -Abdomen is distended, feels full after eating small amount of milk.  No nausea or abdominal pain.  -Reports his anxiety is getting worse.          Assessment and Plan:        Jim Richard is a 65 year old male with PMH significant for alcohol abuse, alcoholic cirrhosis with ascites, JANIS, depression and anxiety, history of G.I. bleed with esophageal varices who presented to ER with alcohol intoxication and suicidal ideation. While in ED he also reported some dark stools and admitted for further evaluation of G.I. bleed     #Likely G.I. bleed with history of esophagitis and esophageal varices  #chronic anemia and thrombocytopenia  - hemodynamically stable with no further melena  - baseline hemoglobin 8-9; hb 9.6---9.0----8.3---stabilized around 8-8.5  - evaluated by GI, EGD 6/11 noted again with grade 1 esophageal varices, moderate portal gastropathy  - continue Protonix PO bid  - was on Rocephin IV for SBP prophylaxis, discontinued 6/13  - does have s/o iron deficiency; got IV iron per nephrology    #Acute on CKD: baseline creatinine around 1.3-1.5  - creatinine on admission 1.7, likely prerenal secondary to alcohol abuse and poor PO intake; held off on PTA Lasix, Aldactone and started on cautious IV hydration but renal function worsened; IVF d/abhijeet on 6/11 evening  - Cr 1.72---1.75---1.89---1.79---1.66---1.76--1.80--1.91--1.96  - nephrology following, Lasix and Aldactone resumed 6/16 at low dose, noted slight increase in creatinine, and so Lasix placed on hold.  Appreciate nephro consult.  - renal US (6/12) UR except for some evidence of chronic urinary bladder outlet obstruction; got IV albumin per Nephrology  - daily BMP     #Alcohol abuse/dependence  #alcoholic cirrhosis with ascites  #medication noncompliance  #Anxiety and depression  Had stopped taking all his medications  "recently PTA. Blood alcohol level on admission 0.32. PTA Lasix and Aldactone held on admission given worsening renal function.   -was placed on Valium per CIWA protocol, not in withdrawal. Tremors much improved,  clonidine discontinued   - evaluated by psych,  PTA regimen including Remeron, Seroquel, trazodone, Trintellix which he has been non-compliant with were resumed per psychiatry recommendation.  Patient continued to feel his anxiety was not controlled.  Psychiatry was reconsulted, Seroquel and Atarax increased.  - apparently his commitment has been revoked,  for disposition planning-- working on Intensive Residential Treatment Services  -diuretic as above  -Last repeat paracentesis 6/15 and >4L removed, albumin was infused.  Managed another paracentesis soon, if not tomorrow, coming Monday.     #BPH: continue Flomax     #COPD  -respiratory status stable continue; continue PTA inhalers     #Itchy eyes: no redness or blurry vision.   -topical eye drops PRN and atarax.     # DVT prophylaxis: mechanical with PCD boots  # Code status: full code    Disposition: unclear, pending placement, SW for disposition                   Physical Exam:      Blood pressure 112/65, pulse 64, temperature 98.6  F (37  C), temperature source Oral, resp. rate 18, height 1.702 m (5' 7\"), weight 86.2 kg (190 lb), SpO2 94 %.  Vitals:    06/10/20 0158   Weight: 86.2 kg (190 lb)     Vital Signs with Ranges  Temp:  [98.5  F (36.9  C)-98.8  F (37.1  C)] 98.6  F (37  C)  Pulse:  [64] 64  Heart Rate:  [67-75] 75  Resp:  [16-18] 18  BP: (112-135)/(56-67) 112/65  SpO2:  [94 %-95 %] 94 %  I/O's Last 24 hours  I/O last 3 completed shifts:  In: 860 [P.O.:860]  Out: 200 [Urine:200]    Constitutional: Alert, awake and orienetd X 3;sitting comfortably in chair.    HEENT: PERRLA EOMI   Oral cavity: Moist oral mucosa   Cardiovascular: Normal s1 s2, regular rate and rhythm, no murmur   Lungs: B/l clear to auscultation, no wheezes or " crepitations   Abdomen: Distended with ascites but soft and  non tender, no guarding, rigidity or rebound; BS +   LE : No edema   Musculoskeletal: Power 5/5 in all extremities    Neuro: No focal neurological deficits noted, CN II to XII grossly intact   Psychiatry: normal mood and affect  Skin: No obvious skin rashes or ulcers                  Medications:          fluticasone-vilanterol  1 puff Inhalation Daily     folic acid  1 mg Oral Daily     hydrOXYzine  100 mg Oral TID     mirtazapine  30 mg Oral At Bedtime     multivitamin w/minerals  1 tablet Oral Daily     pantoprazole  40 mg Oral BID AC     sodium chloride (PF)  3 mL Intracatheter Q8H     spironolactone  25 mg Oral Daily     tamsulosin  0.4 mg Oral QPM     traZODone  100 mg Oral At Bedtime     vortioxetine  10 mg Oral Daily     PRN Meds: artificial tears ophthalmic solution, diazepam **OR** diazepam, lidocaine 4%, lidocaine (buffered or not buffered), melatonin, naloxone, ondansetron **OR** ondansetron, prochlorperazine **OR** prochlorperazine **OR** prochlorperazine, QUEtiapine, QUEtiapine, sodium chloride (PF), sodium chloride (PF)         Data:      All new lab and imaging data was reviewed.   Recent Labs   Lab Test 06/18/20  0629 06/14/20  0745 06/13/20  0838  06/10/20  0206 05/26/20  1215 05/14/20  2322   WBC  --   --   --   --  5.9 2.8* 7.0   HGB 8.8* 8.1* 7.9*   < > 9.6* 8.7* 9.5*   MCV  --   --   --   --  89 92 88   *  --   --   --  161 78* 205   INR  --   --   --   --  1.22* 1.30* 1.23*    < > = values in this interval not displayed.      Recent Labs   Lab Test 06/18/20  0629 06/17/20  0849 06/16/20  0639    135 138   POTASSIUM 4.2 4.0 4.2   CHLORIDE 108 105 108   CO2 26 24 24   BUN 36* 34* 27   CR 1.96* 1.91* 1.80*   ANIONGAP 2* 6 6   KEV 8.7 8.7 8.7   GLC 80 109* 91     Recent Labs   Lab Test 06/10/20  0206 04/26/18 1940   TROPI 0.023 <0.015

## 2020-06-18 NOTE — PLAN OF CARE
Discharge Planner PT   Patient plan for discharge: Did not discuss   Current status: Greeted patient resting supine in bed. Patient IND with mobility in room. Engaged patient in ambulation without assistive device at [facility] for a total distance of 200ft with cues for technique. Engaged patient in ascending/descending 2 flights of steps with single railing at [facility], to 7th floor rehab gym. Engaged patient in higher level dynamic standing balance exercises. Concluded session with patient sitting up in chair.   Barriers to return to prior living situation: impaired balance, fall rsik  Recommendations for discharge: Home with OP-PT  Rationale for recommendations: Patient demonstrates necessary level of functional mobility in order to return to home environment. Patient is recommended to continue with skilled OP-PT in order to improve dynamic balance and activity tolerance in order to return to baseline mobility.       Entered by: Padmini Noel 06/18/2020 3:16 PM

## 2020-06-18 NOTE — PLAN OF CARE
"DATE: 6/17/20 [id number]  Cognitive Concerns/ Orientation : A&Ox4, calm and cooperative.   BEHAVIOR & AGGRESSION TOOL COLOR: Green  CIWA SCORE: 5 (tremors, anxiety and restlessness); 2 (Tremors)  ABNL VS/O2: VSS on RA, remains afebrile.   MOBILITY: Independent, steady gait on shift. Ambulated in hallway multiple times  PAIN MANAGMENT: Reported mild back pain, states its from \"laying in bed too much\"; wanted tylenol (no order)- talked with MD and he will check LFT's in am and might given minimum dose of tylenol depending on lab results. Complaining of worsening distention and SOB; paracentesis in near future per MD. Reports itchy and red eyes; PRN eye drops.   DIET: Regular; 1500cc fluid restriction   BOWEL/BLADDER: Continent of B/B- no BM on shift. Adequate urine output. Strict I &Os.   ABNL LAB/BG: Creat 1.91(up from last result)  DRAIN/DEVICES: NA, infiltrated; No IV access.   TELEMETRY RHYTHM: NA  SKIN: Intact, scattered bruises, bilateral eyes itchy and red; MD aware.   TESTS/PROCEDURES: Paracentesis 6/15- 4300cc removed. Left abdominal dressing; CDI.   D/C DAY/GOALS/PLACE: Waiting for intensive residential treatment, SW following. Pt compliant with plan; educated that he could not leave after he stated \"I almost just left\". Pt verbalizes understanding and states \"I know, I won't leave\".   OTHER IMPORTANT INFO: Renal/GI following. Pt calling appropriately. Start of shift pt stated \"I am upset and almost just left\" as he states his medications aren't helping and he feels very depressed. He's requesting increased frequency of anxiety and depression medications. MD paged and increased atarax ordered and psych re-consulted for unmanaged depression/anxiety; will re-assess pt.   "

## 2020-06-18 NOTE — PROGRESS NOTES
Renal Medicine Progress Note                                Jim Richard MRN# [id number]   Age: 65 year old YOB: 1955   Date of Admission: 6/10/2020 Hospital LOS: 8                  Assessment/Plan:     65-year-old male admitted via the emergency room, dated 06/10/2020-06/11/2020 in the setting of alcohol intoxication and suicidal ideation    Evaluated with respect to elevated serum creatinine      1.  Baseline CKD   -creatinine 1.4 mg/dl   -eGFR 40 to 50 ml/min   -US reviewed  2.  Proteinuria   -modest increased ACR  3.  ARF   -subacute component over preceding months   -recent further increase    -Lona < 5; pre renal v HRS    -completed course of 25% albumin infusion  4.  Anemia   -Fe deficient   -completed IV Fe course  5.  Alcoholic cirrhosis with portal HTN   -furosemide 20 mg and aldactone 25 restarted 06/16/20  6.  BPH and ? Chronic MARTINEZ      Modest improvement in renal function post albumin  1.89 mg/dl ----> 1.66 mg/dl    Creatinine now increasing again post diuretic       Hold furosemide  Continue aldactone  Daily weights       Interval History:     IO and UO reviewed  Labs reviewed    Gradual deterioration in renal function  Lytes OK  BP seems acceptable    No edema    Denies CP or SOB  No weights       ROS:     GENERAL: NAD, No fever,chills  R: NEGATIVE for significant cough or SOB  CV: NEGATIVE for chest pain, palpitations  EXT: no change edema  ROS otherwise negative    Medications and Allergies:     Reviewed    Physical Exam:     Vitals were reviewed  Patient Vitals for the past 12 hrs:   BP Temp Temp src Heart Rate Resp SpO2   06/18/20 0729 112/65 98.6  F (37  C) Oral 75 18 94 %   06/18/20 0437 135/56 98.5  F (36.9  C) Oral 67 18 94 %     I/O last 3 completed shifts:  In: 860 [P.O.:860]  Out: 200 [Urine:200]    Vitals:    06/10/20 0158   Weight: 86.2 kg (190 lb)       GENERAL: awake, alert, follows  HEENT: NC/AT, PERRLA, EOMI, non icteric, pharynx moist without lesion  NECK: supple,  no masses or adenopathy  RESP:  clear anteriorly  CV: RRR, normal S1 S2  ABDOMEN: soft, nontender, no HSM or masses and bowel sounds normal  MS: no clubbing, cyanosis   SKIN: clear without significant rashes or lesions  NEURO: speech normal and cranial nerves 2-12 intact  PSYCH: affect normal/bright  EXT: warm, no edema    Data:     Recent Labs   Lab 06/18/20  0629 06/17/20  0849 06/16/20  0639 06/15/20  0837    135 138 134   POTASSIUM 4.2 4.0 4.2 3.7   CHLORIDE 108 105 108 105   CO2 26 24 24 23   ANIONGAP 2* 6 6 6   GLC 80 109* 91 127*   BUN 36* 34* 27 26   CR 1.96* 1.91* 1.80* 1.76*   GFRESTIMATED 35* 36* 38* 39*   GFRESTBLACK 40* 41* 44* 46*   KEV 8.7 8.7 8.7 8.0*         Recent Labs   Lab Test 06/18/20  0629 06/17/20  0849 06/16/20  0639 06/15/20  0837 06/14/20  0745 06/13/20  0838 06/12/20  0557 06/11/20  0602 06/10/20  0206 05/26/20  1215   CR 1.96* 1.91* 1.80* 1.76* 1.66* 1.79* 1.89* 1.75* 1.72* 1.43*     Recent Labs   Lab 06/18/20  0629 06/17/20  0849   ALBUMIN 2.9* 3.2*     Recent Labs   Lab 06/18/20  0629 06/17/20  0849 06/14/20  0745 06/13/20  0838 06/12/20  0557   PHOS 4.1 3.9  --   --   --    HGB 8.8*  --  8.1* 7.9* 8.4*     No lab results found in last 7 days.  Recent Labs   Lab 06/14/20  1440 06/12/20  1130   UNAR 19 <5     Recent Labs   Lab 06/12/20  1130   UMALCR 30.16*         G Brody Ayala MD    [facility] Nephrology  [phone]

## 2020-06-18 NOTE — PLAN OF CARE
DATE: 6/18/20   Cognitive Concerns/ Orientation : A&Ox4, calm and cooperative.   BEHAVIOR & AGGRESSION TOOL COLOR: Green  CIWA SCORE: 0,1  ABNL VS/O2: VSS on RA, remains afebrile.   MOBILITY: Independent, steady gait on shift. Ambulated in hallway multiple times  PAIN MANAGMENT: Denies   DIET: Regular; 1500cc fluid restriction   BOWEL/BLADDER: Continent of B/B- no BM on shift. Adequate urine output. Strict I &Os.   ABNL LAB/BG: Creat 1.91(up from last result)  DRAIN/DEVICES: No IV access  TELEMETRY RHYTHM: NA  SKIN: Intact, scattered bruises, bilateral eyes itchy and red; MD aware.   TESTS/PROCEDURES: Paracentesis 6/15- 4300cc removed. Left abdominal dressing; CDI.   D/C DAY/GOALS/PLACE: Waiting for intensive residential treatment, SW following. OTHER IMPORTANT INFO: Renal/GI following. Pt calling appropriately. Remained calm throughout shift, no complaints of feeling not like himself.

## 2020-06-18 NOTE — CONSULTS
[facility] Psychiatric Consult Progress Note      Interval History:   Pt seen, care reviewed with treatment team.  The preceding clinical notes were reviewed and appreciated.  Staff report that the patient has been reporting significant anxiety.  On direct interview the patient tells me that he does not know where he is going to be placed and this is making him extremely anxious.  He reports he is barely sleeping at night and states he would like his medications reviewed.  He is requesting to have a higher dose of hydroxyzine and quetiapine.  He denies experiencing any self-harm thoughts plans or intent.  He states he has not been in touch with his family as they do not know that he is in the hospital.  He denies experiencing any self-harm thoughts plans or intent.   Review of systems:   The Review of Systems is negative other than noted in the HPI     Medications:       fluticasone-vilanterol  1 puff Inhalation Daily     folic acid  1 mg Oral Daily     mirtazapine  15 mg Oral At Bedtime     multivitamin w/minerals  1 tablet Oral Daily     pantoprazole  40 mg Oral BID AC     sodium chloride (PF)  3 mL Intracatheter Q8H     spironolactone  25 mg Oral Daily     tamsulosin  0.4 mg Oral QPM     traZODone  100 mg Oral At Bedtime     vortioxetine  10 mg Oral Daily     artificial tears ophthalmic solution, diazepam **OR** diazepam, hydrOXYzine, lidocaine 4%, lidocaine (buffered or not buffered), melatonin, naloxone, ondansetron **OR** ondansetron, prochlorperazine **OR** prochlorperazine **OR** prochlorperazine, QUEtiapine, QUEtiapine, sodium chloride (PF), sodium chloride (PF)      Mental Status Examination:     Appearance:  awake, alert, appeared older than stated age and casually dressed  Eye Contact:  intense  Speech:  clear, coherent and normal prosody  Psychomotor Behavior:  no evidence of tardive dyskinesia, dystonia, or tics, fidgeting and tremor observed   Mood:  anxious  Affect:  mood congruent, intensity  is heightened and full range  Thought Process:  logical, linear and goal oriented no loose associations  Thought Content:  no evidence of suicidal ideation or homicidal ideation and no evidence of psychotic thought  Oriented to:  time, person, and place  Attention Span and Concentration:  intact  Recent and Remote Memory:  fair  Fund of Knowledge: appropriate  Muscle Strength and Tone: normal  Gait and Station: Normal  Insight:  fair  Judgment:  intact        Labs/vitals:     Recent Results (from the past 24 hour(s))   Basic metabolic panel    Collection Time: 06/18/20  6:29 AM   Result Value Ref Range    Sodium 136 133 - 144 mmol/L    Potassium 4.2 3.4 - 5.3 mmol/L    Chloride 108 94 - 109 mmol/L    Carbon Dioxide 26 20 - 32 mmol/L    Anion Gap 2 (L) 3 - 14 mmol/L    Glucose 80 70 - 99 mg/dL    Urea Nitrogen 36 (H) 7 - 30 mg/dL    Creatinine 1.96 (H) 0.66 - 1.25 mg/dL    GFR Estimate 35 (L) >60 mL/min/[1.73_m2]    GFR Estimate If Black 40 (L) >60 mL/min/[1.73_m2]    Calcium 8.7 8.5 - 10.1 mg/dL   Hemoglobin and hematocrit    Collection Time: 06/18/20  6:29 AM   Result Value Ref Range    Hemoglobin 8.8 (L) 13.3 - 17.7 g/dL    Hematocrit 26.8 (L) 40.0 - 53.0 %   Platelet count    Collection Time: 06/18/20  6:29 AM   Result Value Ref Range    Platelet Count 125 (L) 150 - 450 10e9/L   Phosphorus    Collection Time: 06/18/20  6:29 AM   Result Value Ref Range    Phosphorus 4.1 2.5 - 4.5 mg/dL   Magnesium    Collection Time: 06/18/20  6:29 AM   Result Value Ref Range    Magnesium 1.6 1.6 - 2.3 mg/dL   Hepatic panel    Collection Time: 06/18/20  6:29 AM   Result Value Ref Range    Bilirubin Direct 0.3 (H) 0.0 - 0.2 mg/dL    Bilirubin Total 0.6 0.2 - 1.3 mg/dL    Albumin 2.9 (L) 3.4 - 5.0 g/dL    Protein Total 6.3 (L) 6.8 - 8.8 g/dL    Alkaline Phosphatase 207 (H) 40 - 150 U/L    ALT 40 0 - 70 U/L    AST 47 (H) 0 - 45 U/L     B/P: 112/65, T: 98.6, P: 64, R: 18    Impression:   Jim KVNG Solisdougie is a 65 year old male with  established history of major depressive disorder and alcohol use disorder in addition to alcoholic cirrhosis with ascites, JANIS, history of GI bleed with esophageal varices who presented to [facility] ED on account of alcohol intoxication and suicidal ideation.  Psychiatry was consulted to render an opinion on his alcohol use and suicidality.  Patient currently denies active self-harm thoughts plans or intent and is open to pursuing treatment recommendations later by his county .    DIagnoses:   1. Major depressive disorder, recurrent, moderate.  2. Alcohol use disorder, severe.  3. Thrombocytopenia.  4. Chronic anemia  5. Alcoholic cirrhosis with ascites  6. Medication noncompliance       Plan:   1. Written information given on medications. Side effects, risks, benefits reviewed.  2.  Schedule hydroxyzine 100 mg 3 times daily and increase Seroquel to 50 to 100 mg every 6 hours as needed anxiety/agitation.  Increase mirtazapine to 30 mg p.o. nightly  3.  Please reconsult psychiatry as needed.        Attestation:  Patient has been seen and evaluated by me,  Kareem Jacobs MD

## 2020-06-18 NOTE — PLAN OF CARE
Cognitive Concerns/ Orientation : A&Ox4, calm and cooperative.   BEHAVIOR & AGGRESSION TOOL COLOR: Green  CIWA SCORE: 0,0  ABNL VS/O2: VSS on RA, remains afebrile.   MOBILITY: Independent, steady gait on shift. Ambulated in hallway multiple times  PAIN MANAGMENT: Denies   DIET: Regular; 1500cc fluid restriction   BOWEL/BLADDER: Continent of B/B. Adequate urine output. Strict I &Os.   ABNL LAB/BG: Cr 1.96  DRAIN/DEVICES: No IV access  TELEMETRY RHYTHM: NA  SKIN: Intact, scattered bruises, bilateral eyes itchy and red; MD aware.   TESTS/PROCEDURES: Paracentesis 6/15- 4300cc removed. Left abdominal dressing-adhesive intact  D/C DAY/GOALS/PLACE: Waiting for intensive residential treatment, [facility]. OTHER IMPORTANT INFO: ambulated in the hallway frequently

## 2020-06-19 ENCOUNTER — APPOINTMENT (OUTPATIENT)
Dept: PHYSICAL THERAPY | Facility: CLINIC | Age: 65
DRG: 432 | End: 2020-06-19
Payer: MEDICARE

## 2020-06-19 LAB
ANION GAP SERPL CALCULATED.3IONS-SCNC: 6 MMOL/L (ref 3–14)
BUN SERPL-MCNC: 33 MG/DL (ref 7–30)
CALCIUM SERPL-MCNC: 8.3 MG/DL (ref 8.5–10.1)
CHLORIDE SERPL-SCNC: 106 MMOL/L (ref 94–109)
CO2 SERPL-SCNC: 25 MMOL/L (ref 20–32)
CREAT SERPL-MCNC: 1.82 MG/DL (ref 0.66–1.25)
GFR SERPL CREATININE-BSD FRML MDRD: 38 ML/MIN/{1.73_M2}
GLUCOSE SERPL-MCNC: 116 MG/DL (ref 70–99)
POTASSIUM SERPL-SCNC: 4 MMOL/L (ref 3.4–5.3)
SODIUM SERPL-SCNC: 137 MMOL/L (ref 133–144)

## 2020-06-19 PROCEDURE — 36415 COLL VENOUS BLD VENIPUNCTURE: CPT | Performed by: INTERNAL MEDICINE

## 2020-06-19 PROCEDURE — 25000128 H RX IP 250 OP 636: Performed by: HOSPITALIST

## 2020-06-19 PROCEDURE — 97112 NEUROMUSCULAR REEDUCATION: CPT | Mod: GP | Performed by: PHYSICAL THERAPIST

## 2020-06-19 PROCEDURE — 12000000 ZZH R&B MED SURG/OB

## 2020-06-19 PROCEDURE — 25000132 ZZH RX MED GY IP 250 OP 250 PS 637: Mod: GY | Performed by: HOSPITALIST

## 2020-06-19 PROCEDURE — 80048 BASIC METABOLIC PNL TOTAL CA: CPT | Performed by: INTERNAL MEDICINE

## 2020-06-19 PROCEDURE — 25000132 ZZH RX MED GY IP 250 OP 250 PS 637: Mod: GY | Performed by: PSYCHIATRY & NEUROLOGY

## 2020-06-19 PROCEDURE — 99232 SBSQ HOSP IP/OBS MODERATE 35: CPT | Performed by: HOSPITALIST

## 2020-06-19 PROCEDURE — 25000132 ZZH RX MED GY IP 250 OP 250 PS 637: Mod: GY | Performed by: INTERNAL MEDICINE

## 2020-06-19 RX ADMIN — HYDROXYZINE HYDROCHLORIDE 100 MG: 50 TABLET, FILM COATED ORAL at 09:43

## 2020-06-19 RX ADMIN — TAMSULOSIN HYDROCHLORIDE 0.4 MG: 0.4 CAPSULE ORAL at 19:17

## 2020-06-19 RX ADMIN — ONDANSETRON 4 MG: 4 TABLET, ORALLY DISINTEGRATING ORAL at 19:17

## 2020-06-19 RX ADMIN — QUETIAPINE FUMARATE 100 MG: 50 TABLET ORAL at 11:35

## 2020-06-19 RX ADMIN — MULTIPLE VITAMINS W/ MINERALS TAB 1 TABLET: TAB at 09:43

## 2020-06-19 RX ADMIN — TRAZODONE HYDROCHLORIDE 100 MG: 50 TABLET ORAL at 21:52

## 2020-06-19 RX ADMIN — FOLIC ACID 1 MG: 1 TABLET ORAL at 09:43

## 2020-06-19 RX ADMIN — HYDROXYZINE HYDROCHLORIDE 100 MG: 50 TABLET, FILM COATED ORAL at 16:17

## 2020-06-19 RX ADMIN — SPIRONOLACTONE 25 MG: 25 TABLET ORAL at 09:43

## 2020-06-19 RX ADMIN — HYDROXYZINE HYDROCHLORIDE 100 MG: 50 TABLET, FILM COATED ORAL at 21:53

## 2020-06-19 RX ADMIN — PANTOPRAZOLE SODIUM 40 MG: 40 TABLET, DELAYED RELEASE ORAL at 06:38

## 2020-06-19 RX ADMIN — FLUTICASONE FUROATE AND VILANTEROL TRIFENATATE 1 PUFF: 200; 25 POWDER RESPIRATORY (INHALATION) at 09:43

## 2020-06-19 RX ADMIN — VORTIOXETINE 10 MG: 10 TABLET, FILM COATED ORAL at 09:43

## 2020-06-19 RX ADMIN — ONDANSETRON 4 MG: 4 TABLET, ORALLY DISINTEGRATING ORAL at 07:41

## 2020-06-19 RX ADMIN — MIRTAZAPINE 30 MG: 15 TABLET, FILM COATED ORAL at 21:52

## 2020-06-19 RX ADMIN — PANTOPRAZOLE SODIUM 40 MG: 40 TABLET, DELAYED RELEASE ORAL at 16:17

## 2020-06-19 RX ADMIN — QUETIAPINE FUMARATE 100 MG: 50 TABLET ORAL at 21:53

## 2020-06-19 ASSESSMENT — ACTIVITIES OF DAILY LIVING (ADL)
ADLS_ACUITY_SCORE: 11

## 2020-06-19 ASSESSMENT — MIFFLIN-ST. JEOR: SCORE: 1536.51

## 2020-06-19 NOTE — PLAN OF CARE
Discharge Planner PT   Patient plan for discharge: [facility].  Current status: Patient amb on level surface independently without deviation or LOB. Amb up and down 2 flights of steps with one rail safely. Scored 46/56 on hernández balance test. This is indicative of decreased risk of falling and feel patient would have scored even higher if he didn't have back pain that limits his mobility/flexibility at times. Feel patient is very close to baseline and patient agrees. No further PT indicated and do not feel patient needs OPPT.   Barriers to return to prior living situation: none  Recommendations for discharge: Defer to . Based on mobility he is safe to discharge to a home setting.   Rationale for recommendations: Patient is safe and independent with functional mobility. He has mild high level balance deficits but patient feel this is baseline now.        Entered by: Johanna Owen 06/19/2020 4:43 PM    Physical Therapy Discharge Summary    Reason for therapy discharge:    All goals and outcomes met, no further needs identified.    Progress towards therapy goal(s). See goals on Care Plan in [facility] electronic health record for goal details.  Goals met    Therapy recommendation(s):    No further therapy is recommended.

## 2020-06-19 NOTE — PLAN OF CARE
DATE: 06/19/20 7-3 pm  Cognitive Concerns/ Orientation : A & O x4, cooperative.  BEHAVIOR & AGGRESSION TOOL COLOR: Green  CIWA SCORE: Discontinued.   ABNL VS/O2: VSS on RA.  MOBILITY: Independent, steady gait.   PAIN MANAGMENT: C/O of some mild LLQ abdominal pain. Refused much intervention, encouraged heat or ice as needed.   DIET: Regular; 1500cc fluid restriction. Educated pt on fluid restriction, pt ambulates to water fountain himself unsure if he is following.   BOWEL/BLADDER: Continent of B/B, stated he had BM recently.   ABNL LAB/BG: Cr 1.82 and .   DRAIN/DEVICES: No IV access  TELEMETRY RHYTHM: NA  SKIN: Intact, scattered bruises. Dusky. Trace edema BLE.   TESTS/PROCEDURES: Paracentesis site from 6/15 MD ashwini removed per pt report. No leaking noted.   D/C DAY/GOALS/PLACE: Pending he will have phone assessment by Pricilla from [facility] on Monday.   OTHER IMPORTANT INFO: Pt requested PRN Seroquel x1 for feelings of severe anxiety after speaking w/ SW about discharge plans. Abdomen firm distended, C/O of mild shortness of breath. Paracentesis on Monday.

## 2020-06-19 NOTE — PROGRESS NOTES
06/19/20 1500   Signing Clinician's Name / Credentials   Signing clinician's name / credentials Johanna Owen   Garrison Balance Scale (GARRISON CHRIS, ANDRESSA S, BINTA SUAREZ, CINDY TONEY: MEASURING BALANCE IN THE ELDERLY: VALIDATION OF AN INSTRUMENT. CAN. J. PUB. HEALTH, JULY/AUGUST SUPPLEMENT 2:S7-11, 1992.)   Sit To Stand 4   Standing Unsupported 4   Sitting Unsupported 4   Stand to Sit 4   Transfers 4   Standing with Eyes Closed 4   Standing Unsupported, Feet Together 3   Reach Forward With Outstretched Arm 3  (Limited by back pain and inflexibility not balance so gave 3)   Retrieve Object From Floor 3  (Didn't attempt as he has trouble with this due to back pain.)   Turning to Look Behind 4   Turn 360 Degrees 4   Placing Alternate Foot on Stool (4-6 inches) 2   Unsupported Tandem Stand (Demonstrate to Subject) 2   One Leg Stand 1   Total Score (A score of 45 or less has been correlated with an increased risk of falls)   Total Score (out of 56) 46   Garrison Balance Scale (BBS) Cutoff Scores: A score of < 45/56 indicates an increased risk for falls.   Patient Score: 46/56    The BBS is a measure of static and dynamic standing balance that has been validated in community dwelling elderly individuals and individuals who have Parkinson's Disease, MS, and those who are s/p CVA and TBI. The test is administered without an assistive device. Scores from the Garrison are used to determine the probability of falling based on the patient's previous history of falls and their test performance.     Minimal Detectable Change = 6.5   & Minimal Detectable Change (Parkinson's Disease) = 5 according to Trevor & Kvngey 2008    Assessment (rationale for performing, application to patient s function & care plan): To further assess balance  Minutes billed as physical performance test: 12    Patient has back pain that affects his flexibility. Feel score would have been 3-4 points higher if this wasn't a factor.

## 2020-06-19 NOTE — PLAN OF CARE
DATE: 6/18/20 5383-3917  Cognitive Concerns/ Orientation : A&Ox4, calm and cooperative. Pt's mood is much better today than yesterday.   BEHAVIOR & AGGRESSION TOOL COLOR: Green  CIWA SCORE: 0,0  ABNL VS/O2: VSS on RA, remains afebrile.   MOBILITY: Independent, steady gait on shift. Ambulated in hallway multiple times  PAIN MANAGMENT: Denies   DIET: Regular; 1500cc fluid restriction   BOWEL/BLADDER: Continent of B/B- Adequate urine output. Strict I &Os.   ABNL LAB/BG: Cr 1.96 (Trending up)  DRAIN/DEVICES: No IV access, MD aware  TELEMETRY RHYTHM: NA  SKIN: Intact, scattered bruises, bilateral eyes itchy and red; MD aware and PRN eye drops available.    TESTS/PROCEDURES: Paracentesis 6/15- 4300cc removed. Left abdominal dressing-adhesive intact- denies pain at site today.   D/C DAY/GOALS/PLACE: Waiting for intensive residential treatment, SW following. OTHER IMPORTANT INFO: ambulated in the hallway frequently. Very pleasant and appreciative today. Nursing will continue to monitor.

## 2020-06-19 NOTE — PLAN OF CARE
DATE: 6/18/20 2300-0730  Cognitive Concerns/ Orientation : A&Ox4, calm and cooperative.  BEHAVIOR & AGGRESSION TOOL COLOR: Green  CIWA SCORE: 0,0  ABNL VS/O2: VSS on RA  MOBILITY: Independent, steady gait on shift.   PAIN MANAGMENT: Denies   DIET: Regular; 1500cc fluid restriction   BOWEL/BLADDER: Continent of B/B-   ABNL LAB/BG: Cr 1.96 (Trending up)  DRAIN/DEVICES: No IV access, MD aware  TELEMETRY RHYTHM: NA  SKIN: Intact, scattered bruises  TESTS/PROCEDURES: Paracentesis 6/15- 4300cc removed.   D/C DAY/GOALS/PLACE: Waiting for intensive residential treatment, SW following. OTHER IMPORTANT INFO:  Very pleasant and appreciative. Nursing will continue to monitor.

## 2020-06-19 NOTE — PROGRESS NOTES
"[facility]  SPIRITUAL ASSESSMENT Progress Note  [facility] 65     REFERRAL SOURCE: Follow Up    I shared a reflective visit with patient Jim today, which integrated elements of his illness narrative.    -Jim shares he is doing \"better today.\" He shares he is practicing \"acceptance\" of his health condition and that he will be going to a treatment facility instead of home. He named grief around this and around his family and also shares he is practicing \"forgiveness.\"    -Jim shared about his drinking and ways in which this has impacted his life. He shared about how difficult it is for him to stop but \"I've go to.\" We explored what he hopes his future would look like if he stops drinking and explored sources of support in his sobriety. He names AA and going to [facility] as helpful for him. He shares he finds Baptism peaceful and appreciates the community both offer, naming expanding his social support is important to him.    -I offered emotional and spiritual support through reflective listening, spiritual framing and words of comfort and peace. Jim shared about shame he has about his addiction and we spent significant time processing this. I provided spiritual support around working with shame.    -Jim shares he welcomes ongoing visits.    PLAN: [facility] will continue to follow for support.     Ellyn Bassett  Associate    Phone: [phone]  Pager: [phone]    "

## 2020-06-19 NOTE — PROGRESS NOTES
SW: Discharge Planning Update  D: Received a call from Omayra Angela [facility]  [phone]. She reports [facility] will cover the expense of Qi Oliver.  Qi Pleitez, will contact Jim on Monday morning to complete a phone assessment.  Writer has updated Jim.

## 2020-06-19 NOTE — PROGRESS NOTES
Cr lower without furosemide.  1.96 to 1.82.    Will continue to need periodic paracenteses.    Dez Mann MD

## 2020-06-19 NOTE — PROGRESS NOTES
[facility]  Hospitalist Progress Note        When I evaluated patient: 06/19/2020        Interval History:      -Up and was ambulating in hallway, feels abdomen is full but no pain.           Assessment and Plan:        Jim Richard is a 65 year old male with PMH significant for alcohol abuse, alcoholic cirrhosis with ascites, JANIS, depression and anxiety, history of G.I. bleed with esophageal varices who presented to ER with alcohol intoxication and suicidal ideation. While in ED he also reported some dark stools and admitted for further evaluation of G.I. bleed     #Likely G.I. bleed with history of esophagitis and esophageal varices  #chronic anemia and thrombocytopenia  - hemodynamically stable with no further melena  - baseline hemoglobin 8-9; hb 9.6---9.0----8.3---stabilized around 8-8.5  - evaluated by GI, EGD 6/11 noted again with grade 1 esophageal varices, moderate portal gastropathy  - continue Protonix PO bid  - was on Rocephin IV for SBP prophylaxis, discontinued 6/13  - does have s/o iron deficiency; got IV iron per nephrology    #Acute on CKD: baseline creatinine around 1.3-1.5  - creatinine on admission 1.7, likely prerenal secondary to alcohol abuse and poor PO intake; held off on PTA Lasix, Aldactone and started on cautious IV hydration but renal function worsened; IVF d/abhijeet on 6/11 evening  - Cr 1.72---1.75---1.89---1.79---1.66---1.76--1.80--1.91--1.96--1.82  - nephrology following, Lasix and Aldactone resumed 6/16 at low dose, noted slight increase in creatinine, and so Lasix placed on hold.  Appreciate nephro consult.  - renal US (6/12) UR except for some evidence of chronic urinary bladder outlet obstruction; got IV albumin per Nephrology  - daily BMP     #Alcohol abuse/dependence  #alcoholic cirrhosis with ascites  #medication noncompliance  #Anxiety and depression  Had stopped taking all his medications recently PTA. Blood alcohol level on admission 0.32. PTA Lasix and  "Aldactone held on admission given worsening renal function.   -was placed on Valium per CIWA protocol, not in withdrawal. Tremors much improved,  clonidine discontinued   - evaluated by psych,  PTA regimen including Remeron, Seroquel, trazodone, Trintellix which he has been non-compliant with were resumed per psychiatry recommendation.  Patient continued to feel his anxiety was not controlled.  Psychiatry was reconsulted, Seroquel and Atarax increased 6/18.  - apparently his commitment has been revoked,  for disposition planning-- working on Intensive Residential Treatment Services  -diuretic as above  -Last repeat paracentesis 6/15 and >4L removed, albumin was infused.  Will need another paracentesis soon, plan for Monday.     #BPH: continue Flomax     #COPD  -respiratory status stable continue; continue PTA inhalers     #Itchy eyes: no redness or blurry vision.   -topical eye drops PRN and atarax.     # DVT prophylaxis: mechanical with PCD boots  # Code status: full code    Disposition: possibly early next week, he will have phone assessment by Pricilla from [facility] on Monday, pending placement, SW for disposition. Medically stable for discharge.                    Physical Exam:      Blood pressure (!) 142/63, pulse 63, temperature 97.6  F (36.4  C), temperature source Oral, resp. rate 16, height 1.702 m (5' 7\"), weight 79.8 kg (175 lb 14.4 oz), SpO2 96 %.  Vitals:    06/10/20 0158 06/19/20 0612   Weight: 86.2 kg (190 lb) 79.8 kg (175 lb 14.4 oz)     Vital Signs with Ranges  Temp:  [97.6  F (36.4  C)-98.9  F (37.2  C)] 97.6  F (36.4  C)  Pulse:  [63] 63  Heart Rate:  [56-75] 62  Resp:  [16-17] 16  BP: (129-142)/(63-70) 142/63  SpO2:  [94 %-97 %] 96 %  I/O's Last 24 hours  I/O last 3 completed shifts:  In: 980 [P.O.:980]  Out: 2350 [Urine:2350]    Constitutional: Alert, awake and orienetd X 3;sitting comfortably in chair.    HEENT: FRANCIS TOVAR   Oral cavity: Moist oral mucosa   Cardiovascular: Normal " s1 s2, regular rate and rhythm, no murmur   Lungs: B/l clear to auscultation, no wheezes or crepitations   Abdomen: Distended with ascites but soft and  non tender, no guarding, rigidity or rebound; BS +   LE : No edema   Musculoskeletal: Power 5/5 in all extremities    Neuro: No focal neurological deficits noted, CN II to XII grossly intact   Psychiatry: normal mood and affect  Skin: No obvious skin rashes or ulcers                  Medications:          fluticasone-vilanterol  1 puff Inhalation Daily     folic acid  1 mg Oral Daily     hydrOXYzine  100 mg Oral TID     mirtazapine  30 mg Oral At Bedtime     multivitamin w/minerals  1 tablet Oral Daily     pantoprazole  40 mg Oral BID AC     sodium chloride (PF)  3 mL Intracatheter Q8H     spironolactone  25 mg Oral Daily     tamsulosin  0.4 mg Oral QPM     traZODone  100 mg Oral At Bedtime     vortioxetine  10 mg Oral Daily     PRN Meds: artificial tears ophthalmic solution, lidocaine 4%, lidocaine (buffered or not buffered), naloxone, ondansetron **OR** ondansetron, prochlorperazine **OR** prochlorperazine **OR** prochlorperazine, QUEtiapine, QUEtiapine, sodium chloride (PF), sodium chloride (PF)         Data:      All new lab and imaging data was reviewed.   Recent Labs   Lab Test 06/18/20  0629 06/14/20  0745 06/13/20  0838  06/10/20  0206 05/26/20  1215 05/14/20  2322   WBC  --   --   --   --  5.9 2.8* 7.0   HGB 8.8* 8.1* 7.9*   < > 9.6* 8.7* 9.5*   MCV  --   --   --   --  89 92 88   *  --   --   --  161 78* 205   INR  --   --   --   --  1.22* 1.30* 1.23*    < > = values in this interval not displayed.      Recent Labs   Lab Test 06/19/20  0934 06/18/20  0629 06/17/20  0849    136 135   POTASSIUM 4.0 4.2 4.0   CHLORIDE 106 108 105   CO2 25 26 24   BUN 33* 36* 34*   CR 1.82* 1.96* 1.91*   ANIONGAP 6 2* 6   KEV 8.3* 8.7 8.7   * 80 109*     Recent Labs   Lab Test 06/10/20  0206 04/26/18 1940   TROPI 0.023 <0.015

## 2020-06-20 LAB
ANION GAP SERPL CALCULATED.3IONS-SCNC: 5 MMOL/L (ref 3–14)
BUN SERPL-MCNC: 34 MG/DL (ref 7–30)
CALCIUM SERPL-MCNC: 8.5 MG/DL (ref 8.5–10.1)
CHLORIDE SERPL-SCNC: 106 MMOL/L (ref 94–109)
CO2 SERPL-SCNC: 25 MMOL/L (ref 20–32)
CREAT SERPL-MCNC: 1.99 MG/DL (ref 0.66–1.25)
GFR SERPL CREATININE-BSD FRML MDRD: 34 ML/MIN/{1.73_M2}
GLUCOSE SERPL-MCNC: 87 MG/DL (ref 70–99)
POTASSIUM SERPL-SCNC: 4 MMOL/L (ref 3.4–5.3)
SODIUM SERPL-SCNC: 136 MMOL/L (ref 133–144)

## 2020-06-20 PROCEDURE — 25000132 ZZH RX MED GY IP 250 OP 250 PS 637: Mod: GY | Performed by: PSYCHIATRY & NEUROLOGY

## 2020-06-20 PROCEDURE — 25000132 ZZH RX MED GY IP 250 OP 250 PS 637: Mod: GY | Performed by: INTERNAL MEDICINE

## 2020-06-20 PROCEDURE — 25000128 H RX IP 250 OP 636: Performed by: HOSPITALIST

## 2020-06-20 PROCEDURE — 99232 SBSQ HOSP IP/OBS MODERATE 35: CPT | Performed by: HOSPITALIST

## 2020-06-20 PROCEDURE — 25000132 ZZH RX MED GY IP 250 OP 250 PS 637: Mod: GY | Performed by: HOSPITALIST

## 2020-06-20 PROCEDURE — 80048 BASIC METABOLIC PNL TOTAL CA: CPT | Performed by: HOSPITALIST

## 2020-06-20 PROCEDURE — 99207 ZZC CDG-MDM COMPONENT: MEETS MODERATE - UP CODED: CPT | Performed by: HOSPITALIST

## 2020-06-20 PROCEDURE — 12000000 ZZH R&B MED SURG/OB

## 2020-06-20 PROCEDURE — 36415 COLL VENOUS BLD VENIPUNCTURE: CPT | Performed by: HOSPITALIST

## 2020-06-20 RX ADMIN — HYDROXYZINE HYDROCHLORIDE 100 MG: 50 TABLET, FILM COATED ORAL at 15:02

## 2020-06-20 RX ADMIN — SPIRONOLACTONE 25 MG: 25 TABLET ORAL at 08:26

## 2020-06-20 RX ADMIN — FOLIC ACID 1 MG: 1 TABLET ORAL at 08:26

## 2020-06-20 RX ADMIN — HYDROXYZINE HYDROCHLORIDE 100 MG: 50 TABLET, FILM COATED ORAL at 08:26

## 2020-06-20 RX ADMIN — QUETIAPINE FUMARATE 100 MG: 50 TABLET ORAL at 09:42

## 2020-06-20 RX ADMIN — TAMSULOSIN HYDROCHLORIDE 0.4 MG: 0.4 CAPSULE ORAL at 20:14

## 2020-06-20 RX ADMIN — ONDANSETRON 4 MG: 4 TABLET, ORALLY DISINTEGRATING ORAL at 10:38

## 2020-06-20 RX ADMIN — FLUTICASONE FUROATE AND VILANTEROL TRIFENATATE 1 PUFF: 200; 25 POWDER RESPIRATORY (INHALATION) at 08:27

## 2020-06-20 RX ADMIN — TRAZODONE HYDROCHLORIDE 100 MG: 50 TABLET ORAL at 21:51

## 2020-06-20 RX ADMIN — PANTOPRAZOLE SODIUM 40 MG: 40 TABLET, DELAYED RELEASE ORAL at 06:34

## 2020-06-20 RX ADMIN — PANTOPRAZOLE SODIUM 40 MG: 40 TABLET, DELAYED RELEASE ORAL at 16:01

## 2020-06-20 RX ADMIN — HYDROXYZINE HYDROCHLORIDE 100 MG: 50 TABLET, FILM COATED ORAL at 21:51

## 2020-06-20 RX ADMIN — QUETIAPINE FUMARATE 100 MG: 50 TABLET ORAL at 16:01

## 2020-06-20 RX ADMIN — MIRTAZAPINE 30 MG: 15 TABLET, FILM COATED ORAL at 21:51

## 2020-06-20 RX ADMIN — VORTIOXETINE 10 MG: 10 TABLET, FILM COATED ORAL at 08:26

## 2020-06-20 RX ADMIN — MULTIPLE VITAMINS W/ MINERALS TAB 1 TABLET: TAB at 08:26

## 2020-06-20 RX ADMIN — QUETIAPINE FUMARATE 50 MG: 50 TABLET ORAL at 21:51

## 2020-06-20 ASSESSMENT — ACTIVITIES OF DAILY LIVING (ADL)
ADLS_ACUITY_SCORE: 11

## 2020-06-20 ASSESSMENT — MIFFLIN-ST. JEOR: SCORE: 1546.03

## 2020-06-20 NOTE — PROGRESS NOTES
[facility]    Nephrology Progress Note     Assessment & Plan   65-year-old male admitted via the emergency room, dated 06/10/2020-06/11/2020 in the setting of alcohol intoxication and suicidal ideation     Evaluated with respect to elevated serum creatinine     1.  Baseline CKD              -creatinine 1.4 mg/dl              -eGFR 40 to 50 ml/min              -US reviewed  2.  Proteinuria              -modest increased ACR  3.  ARF              -subacute component over preceding months              -recent further increase                          -Lona < 5; pre renal v HRS                          -completed course of 25% albumin infusion  4.  Anemia              -Fe deficient              -completed IV Fe course  5.  Alcoholic cirrhosis with portal HTN              -furosemide 20 mg and aldactone 25 restarted 06/16/20   -furosemide stopped 6/18/20  6.  BPH and ? Chronic MARTINEZ        Plan:    Stop spironolactone as cr up again.      Dez Mann MD  [facility] - Nephrology  [phone]    Interval History   Feels well.  A little bloated around the middle.  Otherwise fine.    Cr up to 1.99    Physical Exam   Temp: 98.6  F (37  C) Temp src: Oral BP: 130/65   Heart Rate: 56 Resp: 16 SpO2: 96 % O2 Device: None (Room air)    Vitals:    06/10/20 0158 06/19/20 0612 06/20/20 0652   Weight: 86.2 kg (190 lb) 79.8 kg (175 lb 14.4 oz) 80.7 kg (178 lb)     Vital Signs with Ranges  Temp:  [97.7  F (36.5  C)-98.6  F (37  C)] 98.6  F (37  C)  Heart Rate:  [56-58] 56  Resp:  [16] 16  BP: (118-135)/(64-69) 130/65  SpO2:  [96 %-97 %] 96 %  I/O last 3 completed shifts:  In: 240 [P.O.:240]  Out: 2500 [Urine:2500]    GENERAL APPEARANCE: pleasant, NAD, a & o  HEENT:  Eyes/ears/nose/neck grossly normal  RESP: lungs cta b c good efforts, no crackles, rhonchi or wheezes  CV: RRR, nl S1/S2, no m/r/g   ABDOMEN: o/s/nt/nd, bs present  EXTREMITIES/SKIN: no rashes/lesions; no edema    Medications        fluticasone-vilanterol  1 puff Inhalation Daily     folic acid  1 mg Oral Daily     hydrOXYzine  100 mg Oral TID     mirtazapine  30 mg Oral At Bedtime     multivitamin w/minerals  1 tablet Oral Daily     pantoprazole  40 mg Oral BID AC     sodium chloride (PF)  3 mL Intracatheter Q8H     tamsulosin  0.4 mg Oral QPM     traZODone  100 mg Oral At Bedtime     vortioxetine  10 mg Oral Daily       Data   BMP  Recent Labs   Lab 06/20/20  0731 06/19/20  0934 06/18/20  0629 06/17/20  0849    137 136 135   POTASSIUM 4.0 4.0 4.2 4.0   CHLORIDE 106 106 108 105   KEV 8.5 8.3* 8.7 8.7   CO2 25 25 26 24   BUN 34* 33* 36* 34*   CR 1.99* 1.82* 1.96* 1.91*   GLC 87 116* 80 109*     Phos@[email or web address](phos:4)  CBC)  Recent Labs   Lab 06/18/20  0629 06/14/20  0745   HGB 8.8* 8.1*   HCT 26.8*  --    *  --      Recent Labs   Lab 06/18/20  0629   AST 47*   ALT 40   ALKPHOS 207*   BILITOTAL 0.6     No lab results found in last 7 days.  No results found for: D2VIT, D3VIT, DTOT  Recent Labs   Lab 06/18/20  0629   HGB 8.8*   HCT 26.8*     No results for input(s): PTHI in the last 168 hours.    Attestation:   I have reviewed today's relevant vital signs, notes, medications, labs and imaging.

## 2020-06-20 NOTE — PLAN OF CARE
Cognitive Concerns/ Orientation : A&O x4, calm and cooperative. Some anxiety at times.   BEHAVIOR & AGGRESSION TOOL COLOR: Green  CIWA SCORE: Discontinued. Some baseline hand tremors   ABNL VS/O2: VSS on RA.  MOBILITY: Independent, steady gait. Up in chair freq. Walking freq in room and halls. Steady gait.   PAIN MANAGMENT: C/O of abdominal discomfort this evening after dinner. Zofran oral given x1 this evening. Nausea resolved.   DIET: Regular; 1500cc fluid restriction. Pt using ice chips this evening.   BOWEL/BLADDER: Continent of B/B, voiding in urinal for staff to measure. Pt reported large BM this evening.   ABNL LAB/BG: Cr 1.82, GFR 38/44, Hgb stable at 8.8  DRAIN/DEVICES: No IV access, MD aware  TELEMETRY RHYTHM: NA  SKIN: Intact, scattered bruises  TESTS/PROCEDURES: Paracentesis site from 6/15 glued.   D/C DAY/GOALS/PLACE: Pending he will have phone assessment by Pricilla from [facility] on Monday.   OTHER IMPORTANT INFO: Pt calls as needed. Walking freq. Hopeful to move on to next step of care with some general anxiety. Appreciative of cares.

## 2020-06-20 NOTE — PLAN OF CARE
Cognitive Concerns/ Orientation: A&O x4, calm and cooperative.   BEHAVIOR & AGGRESSION TOOL COLOR: Green  CIWA SCORE: NA  ABNL VS/O2: VSS on RA.  MOBILITY: Independent   PAIN MANAGMENT: Denies pain  DIET: Regular; 1500cc fluid restriction.   BOWEL/BLADDER: Continent of B/B   ABNL LAB/BG: Cr 1.82, GFR 38/44, Hgb stable at 8.8  DRAIN/DEVICES: No IV access  TELEMETRY RHYTHM: NA  SKIN: Intact, scattered bruises  TESTS/PROCEDURES: Paracentesis site from 6/15 glued.   D/C DAY/GOALS/PLACE: Pending he will have phone assessment by Pricilla from [facility] on Monday.   OTHER IMPORTANT INFO:

## 2020-06-20 NOTE — PLAN OF CARE
DATE: 06/20/20 7-3 pm  Cognitive Concerns/ Orientation : A & O x4, pleasant and cooperative.  BEHAVIOR & AGGRESSION TOOL COLOR: Green  CIWA SCORE: Discontinued.   ABNL VS/O2: VSS on RA, bradycardic at times.   MOBILITY: Independent, steady gait. Ambulating hallway frequently.   PAIN MANAGMENT: Denies.   DIET: Regular; 1500cc fluid restriction. Educated pt on fluid restriction, pt ambulates to water fountain himself unsure if he is following.   BOWEL/BLADDER: Continent of B/B. No issues.   ABNL LAB/BG: Cr 1.99, up from yesterday.   DRAIN/DEVICES: No IV access  TELEMETRY RHYTHM: NA  SKIN: Intact, scattered bruises. Dusky. Trace edema BLE.   TESTS/PROCEDURES: Per MD note possible Paracentesis Monday/Tuesday.   D/C DAY/GOALS/PLACE: Pending he will have phone assessment by Pricilla from [facility] on Monday.   OTHER IMPORTANT INFO: Showered. Pt requested PRN Seroquel x1. C/O of nausea x1, Zofran given and relieved. Abdomen firm distended. Neph following.

## 2020-06-20 NOTE — PROGRESS NOTES
[facility]  Hospitalist Progress Note        When I evaluated patient: 06/20/2020        Interval History:      -Up and was ambulating in hallway, abdomen is distended but no pain. Reports he is urinating a lot. No dysuria         Assessment and Plan:        Jim Richard is a 65 year old male with PMH significant for alcohol abuse, alcoholic cirrhosis with ascites, JANIS, depression and anxiety, history of G.I. bleed with esophageal varices who presented to ER with alcohol intoxication and suicidal ideation. While in ED he also reported some dark stools and admitted for further evaluation of G.I. bleed     #Likely G.I. bleed with history of esophagitis and esophageal varices  #chronic anemia and thrombocytopenia  - hemodynamically stable with no further melena  - baseline hemoglobin 8-9; hb 9.6---9.0----8.3---stabilized around 8-8.5  - evaluated by GI, EGD 6/11 noted again with grade 1 esophageal varices, moderate portal gastropathy  - continue Protonix PO bid  - was on Rocephin IV for SBP prophylaxis, discontinued 6/13  - does have s/o iron deficiency; got IV iron per nephrology    #Acute on CKD: baseline creatinine around 1.3-1.5  - creatinine on admission 1.7, likely prerenal secondary to alcohol abuse and poor PO intake; held off on PTA Lasix, Aldactone and started on cautious IV hydration but renal function worsened; IVF d/abhijeet on 6/11 evening  - Cr 1.72---1.75---1.89---1.79---1.66---1.76--1.80--1.91--1.96--1.82, pending am labs today  - nephrology following, Lasix and Aldactone resumed 6/16 at low dose, noted slight increase in creatinine, and so Lasix placed on hold.  Appreciate nephro consult.  - renal US (6/12) UR except for some evidence of chronic urinary bladder outlet obstruction; got IV albumin per Nephrology  - daily BMP     #Alcohol abuse/dependence  #alcoholic cirrhosis with ascites  #medication noncompliance  #Anxiety and depression  Had stopped taking all his medications  "recently PTA. Blood alcohol level on admission 0.32. PTA Lasix and Aldactone held on admission given worsening renal function.   -was placed on Valium per CIWA protocol, not in withdrawal. Tremors much improved,  clonidine discontinued   - evaluated by psych,  PTA regimen including Remeron, Seroquel, trazodone, Trintellix which he has been non-compliant with were resumed per psychiatry recommendation.  Patient continued to feel his anxiety was not controlled.  Psychiatry was reconsulted, Seroquel and Atarax increased 6/18.  - apparently his commitment has been revoked,  for disposition planning-- working on [facility]  -diuretic as above  -Last repeat paracentesis 6/15 and >4L removed, albumin was infused.  Will need another paracentesis soon, plan for monday/tuesday.     #BPH: continue Flomax     #COPD  -respiratory status stable continue; continue PTA inhalers     #Itchy eyes: no redness or blurry vision.   -topical eye drops PRN and atarax.     # DVT prophylaxis: mechanical with PCD boots  # Code status: full code    Disposition: possibly early next week, he will have phone assessment by Pricilla from [facility] on Monday, pending placement, SW for disposition. Medically stable for discharge.                    Physical Exam:      Blood pressure 130/65, pulse 63, temperature 98.6  F (37  C), temperature source Oral, resp. rate 16, height 1.702 m (5' 7\"), weight 80.7 kg (178 lb), SpO2 96 %.  Vitals:    06/10/20 0158 06/19/20 0612 06/20/20 0652   Weight: 86.2 kg (190 lb) 79.8 kg (175 lb 14.4 oz) 80.7 kg (178 lb)     Vital Signs with Ranges  Temp:  [97.7  F (36.5  C)-98.6  F (37  C)] 98.6  F (37  C)  Heart Rate:  [56-58] 56  Resp:  [16] 16  BP: (118-135)/(64-69) 130/65  SpO2:  [96 %-97 %] 96 %  I/O's Last 24 hours  I/O last 3 completed shifts:  In: 240 [P.O.:240]  Out: 2500 [Urine:2500]    Constitutional: Alert, awake and orienetd X 3;sitting comfortably in chair.    HEENT: " PERRLA EOMI   Oral cavity: Moist oral mucosa   Cardiovascular: Normal s1 s2, regular rate and rhythm, no murmur   Lungs: B/l clear to auscultation, no wheezes or crepitations   Abdomen: Distended with ascites but soft and  non tender, no guarding, rigidity or rebound; BS +   LE : No edema   Musculoskeletal: Power 5/5 in all extremities    Neuro: No focal neurological deficits noted, CN II to XII grossly intact   Psychiatry: normal mood and affect  Skin: No obvious skin rashes or ulcers                  Medications:          fluticasone-vilanterol  1 puff Inhalation Daily     folic acid  1 mg Oral Daily     hydrOXYzine  100 mg Oral TID     mirtazapine  30 mg Oral At Bedtime     multivitamin w/minerals  1 tablet Oral Daily     pantoprazole  40 mg Oral BID AC     sodium chloride (PF)  3 mL Intracatheter Q8H     spironolactone  25 mg Oral Daily     tamsulosin  0.4 mg Oral QPM     traZODone  100 mg Oral At Bedtime     vortioxetine  10 mg Oral Daily     PRN Meds: artificial tears ophthalmic solution, lidocaine 4%, lidocaine (buffered or not buffered), naloxone, ondansetron **OR** ondansetron, prochlorperazine **OR** prochlorperazine **OR** prochlorperazine, QUEtiapine, QUEtiapine, sodium chloride (PF), sodium chloride (PF)         Data:      All new lab and imaging data was reviewed.   Recent Labs   Lab Test 06/18/20  0629 06/14/20  0745 06/13/20  0838  06/10/20  0206 05/26/20  1215 05/14/20  2322   WBC  --   --   --   --  5.9 2.8* 7.0   HGB 8.8* 8.1* 7.9*   < > 9.6* 8.7* 9.5*   MCV  --   --   --   --  89 92 88   *  --   --   --  161 78* 205   INR  --   --   --   --  1.22* 1.30* 1.23*    < > = values in this interval not displayed.      Recent Labs   Lab Test 06/19/20  0934 06/18/20  0629 06/17/20  0849    136 135   POTASSIUM 4.0 4.2 4.0   CHLORIDE 106 108 105   CO2 25 26 24   BUN 33* 36* 34*   CR 1.82* 1.96* 1.91*   ANIONGAP 6 2* 6   KEV 8.3* 8.7 8.7   * 80 109*     Recent Labs   Lab Test  06/10/20  0206 04/26/18 1940   TROPI 0.023 <0.015

## 2020-06-21 LAB
ANION GAP SERPL CALCULATED.3IONS-SCNC: 5 MMOL/L (ref 3–14)
BUN SERPL-MCNC: 36 MG/DL (ref 7–30)
CALCIUM SERPL-MCNC: 8.6 MG/DL (ref 8.5–10.1)
CHLORIDE SERPL-SCNC: 105 MMOL/L (ref 94–109)
CO2 SERPL-SCNC: 24 MMOL/L (ref 20–32)
CREAT SERPL-MCNC: 1.94 MG/DL (ref 0.66–1.25)
GFR SERPL CREATININE-BSD FRML MDRD: 35 ML/MIN/{1.73_M2}
GLUCOSE SERPL-MCNC: 80 MG/DL (ref 70–99)
POTASSIUM SERPL-SCNC: 4.2 MMOL/L (ref 3.4–5.3)
SODIUM SERPL-SCNC: 134 MMOL/L (ref 133–144)

## 2020-06-21 PROCEDURE — 12000000 ZZH R&B MED SURG/OB

## 2020-06-21 PROCEDURE — 25000132 ZZH RX MED GY IP 250 OP 250 PS 637: Mod: GY | Performed by: HOSPITALIST

## 2020-06-21 PROCEDURE — 25000128 H RX IP 250 OP 636: Performed by: HOSPITALIST

## 2020-06-21 PROCEDURE — 99232 SBSQ HOSP IP/OBS MODERATE 35: CPT | Performed by: HOSPITALIST

## 2020-06-21 PROCEDURE — 25000132 ZZH RX MED GY IP 250 OP 250 PS 637: Mod: GY | Performed by: PSYCHIATRY & NEUROLOGY

## 2020-06-21 PROCEDURE — 80048 BASIC METABOLIC PNL TOTAL CA: CPT | Performed by: INTERNAL MEDICINE

## 2020-06-21 PROCEDURE — 36415 COLL VENOUS BLD VENIPUNCTURE: CPT | Performed by: INTERNAL MEDICINE

## 2020-06-21 RX ORDER — BISACODYL 10 MG
10 SUPPOSITORY, RECTAL RECTAL DAILY PRN
Status: DISCONTINUED | OUTPATIENT
Start: 2020-06-21 | End: 2020-07-02 | Stop reason: HOSPADM

## 2020-06-21 RX ORDER — AMOXICILLIN 250 MG
1 CAPSULE ORAL 2 TIMES DAILY
Status: DISCONTINUED | OUTPATIENT
Start: 2020-06-21 | End: 2020-07-02 | Stop reason: HOSPADM

## 2020-06-21 RX ORDER — POLYETHYLENE GLYCOL 3350 17 G/17G
17 POWDER, FOR SOLUTION ORAL DAILY PRN
Status: DISCONTINUED | OUTPATIENT
Start: 2020-06-21 | End: 2020-07-02 | Stop reason: HOSPADM

## 2020-06-21 RX ORDER — AMOXICILLIN 250 MG
2 CAPSULE ORAL 2 TIMES DAILY
Status: DISCONTINUED | OUTPATIENT
Start: 2020-06-21 | End: 2020-07-02 | Stop reason: HOSPADM

## 2020-06-21 RX ADMIN — TAMSULOSIN HYDROCHLORIDE 0.4 MG: 0.4 CAPSULE ORAL at 20:00

## 2020-06-21 RX ADMIN — FOLIC ACID 1 MG: 1 TABLET ORAL at 08:50

## 2020-06-21 RX ADMIN — POLYETHYLENE GLYCOL 3350 17 G: 17 POWDER, FOR SOLUTION ORAL at 12:26

## 2020-06-21 RX ADMIN — HYDROXYZINE HYDROCHLORIDE 100 MG: 50 TABLET, FILM COATED ORAL at 15:14

## 2020-06-21 RX ADMIN — HYDROXYZINE HYDROCHLORIDE 100 MG: 50 TABLET, FILM COATED ORAL at 21:25

## 2020-06-21 RX ADMIN — QUETIAPINE FUMARATE 100 MG: 50 TABLET ORAL at 13:57

## 2020-06-21 RX ADMIN — PANTOPRAZOLE SODIUM 40 MG: 40 TABLET, DELAYED RELEASE ORAL at 06:37

## 2020-06-21 RX ADMIN — MIRTAZAPINE 30 MG: 15 TABLET, FILM COATED ORAL at 21:25

## 2020-06-21 RX ADMIN — TRAZODONE HYDROCHLORIDE 100 MG: 50 TABLET ORAL at 21:25

## 2020-06-21 RX ADMIN — DOCUSATE SODIUM 50 MG AND SENNOSIDES 8.6 MG 1 TABLET: 8.6; 5 TABLET, FILM COATED ORAL at 20:00

## 2020-06-21 RX ADMIN — FLUTICASONE FUROATE AND VILANTEROL TRIFENATATE 1 PUFF: 200; 25 POWDER RESPIRATORY (INHALATION) at 08:56

## 2020-06-21 RX ADMIN — VORTIOXETINE 10 MG: 10 TABLET, FILM COATED ORAL at 08:50

## 2020-06-21 RX ADMIN — QUETIAPINE FUMARATE 100 MG: 50 TABLET ORAL at 21:25

## 2020-06-21 RX ADMIN — PANTOPRAZOLE SODIUM 40 MG: 40 TABLET, DELAYED RELEASE ORAL at 16:15

## 2020-06-21 RX ADMIN — HYDROXYZINE HYDROCHLORIDE 100 MG: 50 TABLET, FILM COATED ORAL at 08:50

## 2020-06-21 RX ADMIN — MULTIPLE VITAMINS W/ MINERALS TAB 1 TABLET: TAB at 08:50

## 2020-06-21 RX ADMIN — ONDANSETRON 4 MG: 4 TABLET, ORALLY DISINTEGRATING ORAL at 09:52

## 2020-06-21 ASSESSMENT — ACTIVITIES OF DAILY LIVING (ADL)
ADLS_ACUITY_SCORE: 11

## 2020-06-21 NOTE — PLAN OF CARE
Cognitive Concerns/ Orientation : A & O x4, pleasant and cooperative.  BEHAVIOR & AGGRESSION TOOL COLOR: Green  CIWA SCORE: Discontinued.   ABNL VS/O2: VSS on RA, bradycardic at times.   MOBILITY: Independent, steady gait. Ambulating hallway frequently.   PAIN MANAGMENT: Denies.   DIET: Regular; 1500cc fluid restriction. Educated pt on fluid restriction, pt ambulates to water fountain himself unsure if he is following.   BOWEL/BLADDER: Continent of B/B. No issues.   ABNL LAB/BG: Cr 1.99, up from yesterday. Voiding appropriately. Pt using urinal to allow staff to monitor.   DRAIN/DEVICES: No IV access, MD aware   TELEMETRY RHYTHM: NA  SKIN: Intact, scattered bruises. Dusky. Trace edema BLE.   TESTS/PROCEDURES: Per MD note possible Paracentesis Monday/Tuesday.   D/C DAY/GOALS/PLACE: Pending he will have phone assessment by Pricilla from [facility] on Monday.   OTHER IMPORTANT INFO: Showered. Pt requested PRN Seroquel x1 and at bedtime.  Abdomen firm distended. Less discomfort and nausea after dinner this evening. Calls as needed.

## 2020-06-21 NOTE — PLAN OF CARE
DATE: 06/21/20 7-3 pm  Cognitive Concerns/ Orientation : A & O x4, pleasant and cooperative.  BEHAVIOR & AGGRESSION TOOL COLOR: Green  ABNL VS/O2: VSS on RA.  MOBILITY: Independent. Ambulating hallway frequently.   PAIN MANAGMENT: Denies.   DIET: Regular; 1500cc fluid restriction. Educated pt on fluid restriction, pt ambulates to water fountain himself unsure if he is following.   BOWEL/BLADDER: Continent of B/B. C/O of constipation this am, PRN Miralax given.   ABNL LAB/BG: Cr 1.94 and Urea Nitrogen 36.  DRAIN/DEVICES: No IV access  TELEMETRY RHYTHM: NA  SKIN: Intact, scattered bruises. Dusky. Trace edema BLE, worse on R leg then L. Pt states this is normal for him.   TESTS/PROCEDURES: Per MD note will have a Paracentesis as close to discharge date as possible.   D/C DAY/GOALS/PLACE: Pending he will have phone assessment by Pricilla from [facility] on Monday.   OTHER IMPORTANT INFO: Pt requested PRN Seroquel x1. C/O of nausea x1, Zofran given and relieved. Abdomen firm distended. Neph following.

## 2020-06-21 NOTE — PROGRESS NOTES
Cognitive Concerns/ Orientation : A & O x4, pleasant and cooperative.  BEHAVIOR & AGGRESSION TOOL COLOR: Green  CIWA SCORE: Discontinued.   ABNL VS/O2: VSS on RA, bradycardic at times.   MOBILITY: Independent, steady gait. Ambulating hallway frequently.   PAIN MANAGMENT: Denies.   DIET: Regular; 1500cc fluid restriction. Educated pt on fluid restriction, pt ambulates to water fountain himself unsure if he is following.   BOWEL/BLADDER: Continent of B/B. No issues.   ABNL LAB/BG: Cr 1.99, up from yesterday. Voiding appropriately. Pt using urinal to allow staff to monitor.   DRAIN/DEVICES: No IV access, MD aware   TELEMETRY RHYTHM: NA  SKIN: Intact, scattered bruises. Dusky. Trace edema BLE.   TESTS/PROCEDURES: Per MD note possible Paracentesis Monday/Tuesday.   D/C DAY/GOALS/PLACE: Pending he will have phone assessment by Pricilla from [facility] on Monday.   OTHER IMPORTANT INFO: Pt slept soundly through the night.  Awoke around 5 and took a walk. Abdomen firm distended.  Calls as needed.

## 2020-06-21 NOTE — PLAN OF CARE
Cognitive Concerns/ Orientation : A & O x4, pleasant and cooperative.  BEHAVIOR & AGGRESSION TOOL COLOR: Green  CIWA SCORE: Discontinued.   ABNL VS/O2: VSS on RA, bradycardic at times.   MOBILITY: Independent, steady gait. Ambulating hallway frequently.   PAIN MANAGMENT: Denies.   DIET: Regular; 1500cc fluid restriction. Educated pt on fluid restriction, pt ambulates to water fountain himself unsure if he is following.   BOWEL/BLADDER: Continent of B/B. No issues.   ABNL LAB/BG: Cr 1.99, up from yesterday. Voiding appropriately. Pt using urinal to allow staff to monitor.   DRAIN/DEVICES: No IV access, MD aware   TELEMETRY RHYTHM: NA  SKIN: Intact, scattered bruises. Dusky. Trace edema BLE.   TESTS/PROCEDURES: Per MD note possible Paracentesis Monday/Tuesday.   D/C DAY/GOALS/PLACE: Pending he will have phone assessment by Pricilla from [facility] on Monday.   OTHER IMPORTANT INFO: Pt slept soundly through the night.  Awoke around 5 and took a walk. Abdomen firm distended.  Calls as needed.

## 2020-06-21 NOTE — PROGRESS NOTES
[facility]  Hospitalist Progress Note        When I evaluated patient: 06/21/2020        Interval History:      -Up and was ambulating in hallway/room, reports constipation. Abdominal distension unchanged, and denies dyspnea or abd pain.    - also reported tender lumps in bilateral breasts.          Assessment and Plan:        Jim Richard is a 65 year old male with PMH significant for alcohol abuse, alcoholic cirrhosis with ascites, JANIS, depression and anxiety, history of G.I. bleed with esophageal varices who presented to ER with alcohol intoxication and suicidal ideation. While in ED he also reported some dark stools and admitted for further evaluation of G.I. bleed.     #Likely G.I. bleed with history of esophagitis and esophageal varices  #chronic anemia and thrombocytopenia  - hemodynamically stable with no further melena  - baseline hemoglobin 8-9; hb 9.6---9.0----8.3---stabilized around 8-8.5  - evaluated by GI, EGD 6/11 noted again with grade 1 esophageal varices, moderate portal gastropathy  - continue Protonix PO bid  - was on Rocephin IV for SBP prophylaxis, discontinued 6/13  - does have s/o iron deficiency; got IV iron per nephrology    #Acute on CKD: baseline creatinine around 1.3-1.5  - creatinine on admission 1.7, likely prerenal secondary to alcohol abuse and poor PO intake; held off on PTA Lasix, Aldactone and started on cautious IV hydration but renal function worsened; IVF d/abhijeet on 6/11 evening  - Cr 1.72---1.75---1.89---1.79---1.66---1.76--1.80--1.91--1.96--1.82--1.99--1.94  - nephrology following, Lasix and Aldactone resumed 6/16 at low dose, noted slight increase in creatinine, and so Lasix placed on hold initially then aldactone as well on hold currently.  Appreciate nephro consult.  - renal US (6/12) UR except for some evidence of chronic urinary bladder outlet obstruction; got IV albumin per Nephrology  - daily BMP     #Alcohol abuse/dependence  #alcoholic  "cirrhosis with ascites  #medication noncompliance  #Anxiety and depression  Had stopped taking all his medications recently PTA. Blood alcohol level on admission 0.32. PTA Lasix and Aldactone held on admission given worsening renal function.   -was placed on Valium per CIWA protocol, not in withdrawal. Tremors much improved,  clonidine discontinued   - evaluated by psych,  PTA regimen including Remeron, Seroquel, trazodone, Trintellix which he has been non-compliant with were resumed per psychiatry recommendation.  Patient continued to feel his anxiety was not controlled.  Psychiatry was reconsulted, Seroquel and Atarax increased 6/18.  - apparently his commitment has been revoked,  for disposition planning-- working on [facility]  -diuretic as above  -Last repeat paracentesis 6/15 and >4L removed, albumin was infused. Will need another paracentesis soon, will reassess in am and decide if he needs it tomorrow or tuesday. Plan it to push further as close to his discharge  as possible so he does not have to come back sooner for repeat paracentesis.     #BPH: continue Flomax     #COPD  -respiratory status stable continue; continue PTA inhalers     #breast tenderness:  It appears to be due to gynecomastia related to his cirrhosis.     #Constipation  Bowel regimen added.    # DVT prophylaxis: mechanical with PCD boots  # Code status: full code    Disposition: possibly early next week, he will have phone assessment by Pricilla from [facility] on Monday to evaluate for placement at [facility]. SW following for disposition. Medically stable for discharge, although he will need high volume paracentesis prior to discharge.                    Physical Exam:      Blood pressure 132/70, pulse 63, temperature 98.3  F (36.8  C), temperature source Oral, resp. rate 16, height 1.702 m (5' 7\"), weight 80.7 kg (178 lb), SpO2 96 %.  Vitals:    06/10/20 0158 06/19/20 0612 06/20/20 0652   Weight: 86.2 " kg (190 lb) 79.8 kg (175 lb 14.4 oz) 80.7 kg (178 lb)     Vital Signs with Ranges  Temp:  [97.8  F (36.6  C)-98.7  F (37.1  C)] 98.3  F (36.8  C)  Heart Rate:  [61-64] 61  Resp:  [16] 16  BP: (118-137)/(68-72) 132/70  SpO2:  [96 %-97 %] 96 %  I/O's Last 24 hours  I/O last 3 completed shifts:  In: 120 [P.O.:120]  Out: 925 [Urine:925]    Constitutional: Alert, awake and orienetd X 3;sitting comfortably in chair.    HEENT: PERRLA EOMI   Oral cavity: Moist oral mucosa   Cardiovascular: Normal s1 s2, regular rate and rhythm, no murmur   Lungs: B/l clear to auscultation, no wheezes or crepitations   Abdomen: Distended with ascites but soft and  non tender, no guarding, rigidity or rebound; BS +   LE : No edema   Musculoskeletal: Power 5/5 in all extremities    Neuro: No focal neurological deficits noted, CN II to XII grossly intact   Psychiatry: normal mood and affect  Skin: No obvious skin rashes or ulcers  Chest: mildly tender small breast lumps noted.                   Medications:          fluticasone-vilanterol  1 puff Inhalation Daily     folic acid  1 mg Oral Daily     hydrOXYzine  100 mg Oral TID     mirtazapine  30 mg Oral At Bedtime     multivitamin w/minerals  1 tablet Oral Daily     pantoprazole  40 mg Oral BID AC     senna-docusate  1 tablet Oral BID    Or     senna-docusate  2 tablet Oral BID     sodium chloride (PF)  3 mL Intracatheter Q8H     tamsulosin  0.4 mg Oral QPM     traZODone  100 mg Oral At Bedtime     vortioxetine  10 mg Oral Daily     PRN Meds: bisacodyl, artificial tears ophthalmic solution, lidocaine 4%, lidocaine (buffered or not buffered), naloxone, ondansetron **OR** ondansetron, polyethylene glycol, prochlorperazine **OR** prochlorperazine **OR** prochlorperazine, QUEtiapine, QUEtiapine         Data:      All new lab and imaging data was reviewed.   Recent Labs   Lab Test 06/18/20  0629 06/14/20  0745 06/13/20  0838  06/10/20  0206 05/26/20  1215 05/14/20  2322   WBC  --   --   --   --   5.9 2.8* 7.0   HGB 8.8* 8.1* 7.9*   < > 9.6* 8.7* 9.5*   MCV  --   --   --   --  89 92 88   *  --   --   --  161 78* 205   INR  --   --   --   --  1.22* 1.30* 1.23*    < > = values in this interval not displayed.      Recent Labs   Lab Test 06/21/20  0626 06/20/20  0731 06/19/20  0934    136 137   POTASSIUM 4.2 4.0 4.0   CHLORIDE 105 106 106   CO2 24 25 25   BUN 36* 34* 33*   CR 1.94* 1.99* 1.82*   ANIONGAP 5 5 6   KEV 8.6 8.5 8.3*   GLC 80 87 116*     Recent Labs   Lab Test 06/10/20  0206 04/26/18  1940   TROPI 0.023 <0.015

## 2020-06-22 LAB
ANION GAP SERPL CALCULATED.3IONS-SCNC: 5 MMOL/L (ref 3–14)
BUN SERPL-MCNC: 37 MG/DL (ref 7–30)
CALCIUM SERPL-MCNC: 8.9 MG/DL (ref 8.5–10.1)
CHLORIDE SERPL-SCNC: 108 MMOL/L (ref 94–109)
CO2 SERPL-SCNC: 24 MMOL/L (ref 20–32)
CREAT SERPL-MCNC: 2.12 MG/DL (ref 0.66–1.25)
GFR SERPL CREATININE-BSD FRML MDRD: 31 ML/MIN/{1.73_M2}
GLUCOSE SERPL-MCNC: 91 MG/DL (ref 70–99)
POTASSIUM SERPL-SCNC: 4.3 MMOL/L (ref 3.4–5.3)
SODIUM SERPL-SCNC: 137 MMOL/L (ref 133–144)

## 2020-06-22 PROCEDURE — 12000000 ZZH R&B MED SURG/OB

## 2020-06-22 PROCEDURE — 25000132 ZZH RX MED GY IP 250 OP 250 PS 637: Mod: GY | Performed by: PSYCHIATRY & NEUROLOGY

## 2020-06-22 PROCEDURE — 36415 COLL VENOUS BLD VENIPUNCTURE: CPT | Performed by: HOSPITALIST

## 2020-06-22 PROCEDURE — 25000132 ZZH RX MED GY IP 250 OP 250 PS 637: Mod: GY | Performed by: HOSPITALIST

## 2020-06-22 PROCEDURE — 99232 SBSQ HOSP IP/OBS MODERATE 35: CPT | Performed by: PHYSICIAN ASSISTANT

## 2020-06-22 PROCEDURE — 80048 BASIC METABOLIC PNL TOTAL CA: CPT | Performed by: HOSPITALIST

## 2020-06-22 RX ADMIN — POLYETHYLENE GLYCOL 3350 17 G: 17 POWDER, FOR SOLUTION ORAL at 21:44

## 2020-06-22 RX ADMIN — PANTOPRAZOLE SODIUM 40 MG: 40 TABLET, DELAYED RELEASE ORAL at 16:24

## 2020-06-22 RX ADMIN — VORTIOXETINE 10 MG: 10 TABLET, FILM COATED ORAL at 08:05

## 2020-06-22 RX ADMIN — PANTOPRAZOLE SODIUM 40 MG: 40 TABLET, DELAYED RELEASE ORAL at 06:34

## 2020-06-22 RX ADMIN — MULTIPLE VITAMINS W/ MINERALS TAB 1 TABLET: TAB at 08:05

## 2020-06-22 RX ADMIN — HYDROXYZINE HYDROCHLORIDE 100 MG: 50 TABLET, FILM COATED ORAL at 21:44

## 2020-06-22 RX ADMIN — TRAZODONE HYDROCHLORIDE 100 MG: 50 TABLET ORAL at 21:44

## 2020-06-22 RX ADMIN — HYDROXYZINE HYDROCHLORIDE 100 MG: 50 TABLET, FILM COATED ORAL at 08:05

## 2020-06-22 RX ADMIN — HYDROXYZINE HYDROCHLORIDE 100 MG: 50 TABLET, FILM COATED ORAL at 16:24

## 2020-06-22 RX ADMIN — QUETIAPINE FUMARATE 100 MG: 50 TABLET ORAL at 20:01

## 2020-06-22 RX ADMIN — QUETIAPINE FUMARATE 50 MG: 50 TABLET ORAL at 22:04

## 2020-06-22 RX ADMIN — DOCUSATE SODIUM 50 MG AND SENNOSIDES 8.6 MG 1 TABLET: 8.6; 5 TABLET, FILM COATED ORAL at 20:01

## 2020-06-22 RX ADMIN — QUETIAPINE FUMARATE 100 MG: 50 TABLET ORAL at 08:09

## 2020-06-22 RX ADMIN — FLUTICASONE FUROATE AND VILANTEROL TRIFENATATE 1 PUFF: 200; 25 POWDER RESPIRATORY (INHALATION) at 08:05

## 2020-06-22 RX ADMIN — TAMSULOSIN HYDROCHLORIDE 0.4 MG: 0.4 CAPSULE ORAL at 19:34

## 2020-06-22 RX ADMIN — FOLIC ACID 1 MG: 1 TABLET ORAL at 08:05

## 2020-06-22 RX ADMIN — MIRTAZAPINE 30 MG: 15 TABLET, FILM COATED ORAL at 21:44

## 2020-06-22 RX ADMIN — QUETIAPINE FUMARATE 100 MG: 50 TABLET ORAL at 14:00

## 2020-06-22 ASSESSMENT — ACTIVITIES OF DAILY LIVING (ADL)
ADLS_ACUITY_SCORE: 11

## 2020-06-22 ASSESSMENT — MIFFLIN-ST. JEOR: SCORE: 1569.17

## 2020-06-22 NOTE — PLAN OF CARE
Cognitive Concerns/ Orientation : A&Ox4- pleasant and cooperative on shift , anxious (prn Seroquel given x2, scheduled Atarax given x1 this shift)   BEHAVIOR & AGGRESSION TOOL COLOR: Green  ABNL VS/O2: VSS on RA  MOBILITY: Independent, steady gait. Likes to ambulate in muniz ways.   PAIN MANAGMENT: Denies. Some generalized abdominal discomfort d/t to distended abdomen  DIET: Regular; 1500cc fluid restriction. Pt verbalizing an understanding of the fluid restrictions, and to report what he drinks   BOWEL/BLADDER: Continent of B/B. Urinal at bedside.   ABNL LAB/BG: Cr 2.12 (Nephrology following)  DRAIN/DEVICES: No IV access  TELEMETRY RHYTHM: NA  SKIN: Intact, scattered bruises. Dusky. Trace edema BLE, worse on L then R.   TESTS/PROCEDURES: Per MD note will have a Paracentesis as close to discharge date as possible.   D/C DAY/GOALS/PLACE: Pending he will have phone assessment by Pricilla from [facility] today 6/22  OTHER IMPORTANT INFO:

## 2020-06-22 NOTE — PROGRESS NOTES
[facility]    Medicine History and Physical - Hospitalist Service       Date of Admission:  6/10/2020    Assessment & Plan   Jim Richard is a 65 year old male with PMH significant for alcohol abuse, alcoholic cirrhosis with ascites, JANIS, depression and anxiety, history of G.I. bleed with esophageal varices who presented to ER with alcohol intoxication and suicidal ideation. While in ED he also reported some dark stools and admitted for further evaluation of G.I. bleed.    #Alcohol abuse/dependence  #alcoholic cirrhosis with ascites  #medication noncompliance  #Anxiety and depression  Had stopped taking all his medications recently PTA. Blood alcohol level on admission 0.32. PTA Lasix and Aldactone held on admission given worsening renal function.   -was placed on Valium per CIWA protocol, not in withdrawal. Tremors much improved,  clonidine discontinued   - evaluated by psych,  PTA regimen including Remeron, Seroquel, trazodone, Trintellix which he has been non-compliant with were resumed per psychiatry recommendation.  Patient continued to feel his anxiety was not controlled.  Psychiatry was reconsulted, Seroquel and Atarax increased 6/18.  -diuretics on hold as above  -apparently his commitment has been revoked,  for disposition planning-- working on [facility]  -Last repeat paracentesis 6/15 and >4L removed, albumin was infused.  -Needs another paracentesis soon, currently no SOB or significantly uncomfortable abd distension. Plan for paracentesis on day of discharge (?Tues). Interview with [facility] today.     #Acute on CKD: baseline creatinine around 1.3-1.5  - creatinine on admission 1.7, likely prerenal secondary to alcohol abuse and poor PO intake; held off on PTA Lasix, Aldactone and started on cautious IV hydration but renal function worsened; IVF d/abhijeet on 6/11 evening  - Cr  1.72---1.75---1.89---1.79---1.66---1.76--1.80--1.91--1.96--1.82--1.99--1.94  - renal US (6/12) UR except for some evidence of chronic urinary bladder outlet obstruction; got IV albumin per Nephrology  - Appreciate nephrology recommendations; Lasix and Aldactone resumed 6/16 at low dose followed by slight increase in creatinine. Subsequently Lasix then aldactone again held and remains on hold 6/22.  - daily BMP    #Likely G.I. bleed with history of esophagitis and esophageal varices  #chronic anemia and thrombocytopenia  - hemodynamically stable with no further melena  - baseline hemoglobin 8-9; hb 9.6---9.0----8.3---stabilized around 8-8.5  - evaluated by GI, EGD 6/11 noted again with grade 1 esophageal varices, moderate portal gastropathy  - was on Rocephin IV for SBP prophylaxis, discontinued 6/13  - does have s/o iron deficiency; got IV iron per nephrology  - continue Protonix PO bid     #BPH: continue Flomax     #COPD  -respiratory status stable continue; continue PTA inhalers     #breast tenderness:  It appears to be due to gynecomastia related to his cirrhosis.      #Constipation  Bowel regimen added.  Diet: Regular Diet Adult  Fluid restriction 1500 ML FLUID    Leger Catheter: not present    DVT Prophylaxis: Low Risk/Ambulatory with no VTE prophylaxis indicated  Code Status: Full Code    Expected discharge: ?6/23, recommended to IRTC once safe disposition plan/ TCU bed available.    The patient's care was discussed with the Attending Physician, Dr. Ledesma and Patient.    JoAnna K. Barthell, PA-C  Hospitalist Service  [facility]    ______________________________________________________________________    Interval History    No cp or sob. Yes abd distention, but tolerable.  Interview with [facility] today.    Data reviewed today: I reviewed all medications, new labs and imaging results over the last 24 hours. I personally reviewed no images or EKG's today.    Physical Exam   Vital Signs: Temp:  98.1  F (36.7  C) Temp src: Oral BP: (!) 153/74   Heart Rate: 62 Resp: 16 SpO2: 95 % O2 Device: None (Room air)    Weight: 183 lbs 1.6 oz  Constitutional: Appears stated age, no acute distress.  Respiratory: Breath sounds CTA. No increased work of breathing.  Cardiovascular: RRR, no rub or murmur. No peripheral edema.  GI: Soft, non-tender. Distended from ascites. No rebound or guarding.  Skin: Warm, dry, no rashes or lesions.    Medications       fluticasone-vilanterol  1 puff Inhalation Daily     folic acid  1 mg Oral Daily     hydrOXYzine  100 mg Oral TID     mirtazapine  30 mg Oral At Bedtime     multivitamin w/minerals  1 tablet Oral Daily     pantoprazole  40 mg Oral BID AC     senna-docusate  1 tablet Oral BID    Or     senna-docusate  2 tablet Oral BID     tamsulosin  0.4 mg Oral QPM     traZODone  100 mg Oral At Bedtime     vortioxetine  10 mg Oral Daily       Data   Recent Labs   Lab 06/22/20  0616 06/21/20  0626 06/20/20  0731  06/18/20  0629 06/17/20  0849   HGB  --   --   --   --  8.8*  --    PLT  --   --   --   --  125*  --     134 136   < > 136 135   POTASSIUM 4.3 4.2 4.0   < > 4.2 4.0   CHLORIDE 108 105 106   < > 108 105   CO2 24 24 25   < > 26 24   BUN 37* 36* 34*   < > 36* 34*   CR 2.12* 1.94* 1.99*   < > 1.96* 1.91*   ANIONGAP 5 5 5   < > 2* 6   KEV 8.9 8.6 8.5   < > 8.7 8.7   GLC 91 80 87   < > 80 109*   ALBUMIN  --   --   --   --  2.9* 3.2*   PROTTOTAL  --   --   --   --  6.3*  --    BILITOTAL  --   --   --   --  0.6  --    ALKPHOS  --   --   --   --  207*  --    ALT  --   --   --   --  40  --    AST  --   --   --   --  47*  --     < > = values in this interval not displayed.       Imaging:  No results found for this or any previous visit (from the past 24 hour(s)).

## 2020-06-22 NOTE — PLAN OF CARE
Cognitive Concerns/ Orientation : A&Ox4, pleasant and cooperative. Quiet and appreciative of cares. Pt has some anxieties with next steps after hospitalization and interview with treatment facility tomorrow.   BEHAVIOR & AGGRESSION TOOL COLOR: Green  ABNL VS/O2: VSS on RA  MOBILITY: Independent. Ambulating hallway frequently. Up in chair most of day.   PAIN MANAGMENT: Denies. Some generalized abdominal discomfort d/t to distended abdomin.   DIET: Regular; 1500cc fluid restriction. Educated pt on fluid restriction, pt ambulates to water fountain and using ice chips to minimize fluid intake.   BOWEL/BLADDER: Continent of B/B. Pt had BM this evening after PRN Miralax administered today. Using urinal to help staff monitor output.   ABNL LAB/BG: Cr 1.94 and Urea Nitrogen 36.  DRAIN/DEVICES: No IV access  TELEMETRY RHYTHM: NA  SKIN: Intact, scattered bruises. Dusky. Trace edema BLE, worse on L then R.   TESTS/PROCEDURES: Per MD note will have a Paracentesis as close to discharge date as possible.   D/C DAY/GOALS/PLACE: Pending he will have phone assessment by Pric[facility] from [facility] on Monday.   OTHER IMPORTANT INFO: Pt has scheduled interview tomorrow. Calls as needed. Walks freq in halls and room. Neph following.

## 2020-06-22 NOTE — PROGRESS NOTES
"[facility]     Renal Progress Note       SHORTHAND KEY FOR MY NOTES:  c = with, s = without, p = after, a = before, x = except, asx = asymptomatic, tx = transplant or treatment, sx = symptoms or symptomatic, cx = canceled or culture, rxn = reaction, yday = yesterday, nl = normal, abx = antibiotics, fxn = function, dx = diagnosis, dz = disease, m/h = melena/hematochezia, c/d/l/ha = cramping/dizziness/lightheadedness/headache, d/c = discharge or diarrhea/constipation, f/c/n/v = fevers/chills/nausea/vomiting, cp/sob = chest pain/shortness of breath, tbv = total body volume, rxn = reaction, tdc = tunneled dialysis catheter, pta = prior to admission, hd = hemodialysis, pd = peritoneal dialysis, hhd = home hemodialysis         Assessment/Plan:     1.  NGUYEN/CKD. Pt's cr is up to 2.1 today, but he is making a lot more urine (~4L yday) s diuretics.  TBV up, but diuretics held - last furos 6/18 and last spirono 6/20.  Explained that he prob has HRS 2 and that he will need to maintain sobriety if dialysis is to be considered in the event of kidney failure.  A.  Follow labs, uo, sx.  B.  Continue to hold diuretics.    2.  Cirrhosis c ascites.  Pt's abd is slightly distended, but not excessively so.  He doesn't need a tap at this time.  A.  Follow clinically.    3.  Anemia. Hb was 8.8 when last checked a few days ago.  No signs of bleeding.  A.  Check hb and follow clinically.    4.  FEN.  Electrolytes are ok.        Interval History:     Pt is feeling ok. He denies any n/v/itching, but has some acid reflux.  No cp/sob.  \"When I get sob, it's time for a tap.\"  Last para was ~1 wk ago per pt.  No f/c.  He is urinating \"a lot\" and denies any d/h/flank pain.  Diuretics have been held.          Medications and Allergies:       fluticasone-vilanterol  1 puff Inhalation Daily     folic acid  1 mg Oral Daily     hydrOXYzine  100 mg Oral TID     mirtazapine  30 mg Oral At Bedtime     multivitamin w/minerals  1 tablet " "Oral Daily     pantoprazole  40 mg Oral BID AC     senna-docusate  1 tablet Oral BID    Or     senna-docusate  2 tablet Oral BID     tamsulosin  0.4 mg Oral QPM     traZODone  100 mg Oral At Bedtime     vortioxetine  10 mg Oral Daily     Allergies   Allergen Reactions     Amlodipine Swelling     Lisinopril      Other reaction(s): Angioedema  Mouth and tongue swelling   Mouth and tongue swelling             Physical Exam:     Vitals were reviewed    Heart Rate: 62, Blood pressure (!) 153/74, pulse 63, temperature 98.1  F (36.7  C), temperature source Oral, resp. rate 16, height 1.702 m (5' 7\"), weight 83.1 kg (183 lb 1.6 oz), SpO2 95 %.  Wt Readings from Last 3 Encounters:   06/22/20 83.1 kg (183 lb 1.6 oz)   05/26/20 86.2 kg (190 lb)   05/03/20 84.1 kg (185 lb 6.4 oz)     Intake/Output Summary (Last 24 hours) at 6/22/2020 1238  Last data filed at 6/22/2020 0804  Gross per 24 hour   Intake 1240 ml   Output 2400 ml   Net -1160 ml     GENERAL APPEARANCE: pleasant, NAD, alert  HEENT:  eyes/ears/nose/neck grossly normal  RESP: lungs cta b c good efforts, no crackles  CV: RRR, nl S1/S2  ABDOMEN: o/s/nt, slightly distended  EXTREMITIES/SKIN: 1-2+ ble edema - L > R         Data:     CBC RESULTS:     Recent Labs   Lab 06/18/20  0629   HGB 8.8*   HCT 26.8*   *     Basic Metabolic Panel:  Recent Labs   Lab 06/22/20  0616 06/21/20  0626 06/20/20  0731 06/19/20  0934 06/18/20  0629 06/17/20  0849    134 136 137 136 135   POTASSIUM 4.3 4.2 4.0 4.0 4.2 4.0   CHLORIDE 108 105 106 106 108 105   CO2 24 24 25 25 26 24   BUN 37* 36* 34* 33* 36* 34*   CR 2.12* 1.94* 1.99* 1.82* 1.96* 1.91*   GLC 91 80 87 116* 80 109*   KEV 8.9 8.6 8.5 8.3* 8.7 8.7     INRNo lab results found in last 7 days.   Attestation:   I have reviewed today's relevant vital signs, notes, medications, labs and imaging.    Christian Harvey MD  [facility] - Nephrology  [phone]  "

## 2020-06-22 NOTE — PROGRESS NOTES
Cognitive Concerns/ Orientation : A&Ox4- pleasant and cooperative on shift   BEHAVIOR & AGGRESSION TOOL COLOR: Green  ABNL VS/O2: VSS on RA  MOBILITY: Independent, steady gait. Likes to ambulate in muniz ways.   PAIN MANAGMENT: Denies. Some generalized abdominal discomfort d/t to distended abdomen  DIET: Regular; 1500cc fluid restriction. Pt verbalizing an understanding of the fluid restrictions, and to report what he drinks   BOWEL/BLADDER: Continent of B/B. Urinal at bedside. No BM on shift   ABNL LAB/BG: Cr 1.94 and Urea Nitrogen 36.  DRAIN/DEVICES: No IV access  TELEMETRY RHYTHM: NA  SKIN: Intact, scattered bruises. Dusky. Trace edema BLE, worse on L then R.   TESTS/PROCEDURES: Per MD note will have a Paracentesis as close to discharge date as possible.   D/C DAY/GOALS/PLACE: Pending he will have phone assessment by [facility] from [facility] today 6/22  OTHER IMPORTANT INFO: Pt has scheduled interview tomorrow. Calls as needed. Walks freq in halls and room. Neph following.

## 2020-06-23 ENCOUNTER — APPOINTMENT (OUTPATIENT)
Dept: ULTRASOUND IMAGING | Facility: CLINIC | Age: 65
DRG: 432 | End: 2020-06-23
Attending: PHYSICIAN ASSISTANT
Payer: MEDICARE

## 2020-06-23 LAB
ALBUMIN SERPL-MCNC: 3.2 G/DL (ref 3.4–5)
ANION GAP SERPL CALCULATED.3IONS-SCNC: 5 MMOL/L (ref 3–14)
BASOPHILS # BLD AUTO: 0 10E9/L (ref 0–0.2)
BASOPHILS NFR BLD AUTO: 1.1 %
BUN SERPL-MCNC: 32 MG/DL (ref 7–30)
CALCIUM SERPL-MCNC: 9 MG/DL (ref 8.5–10.1)
CHLORIDE SERPL-SCNC: 106 MMOL/L (ref 94–109)
CO2 SERPL-SCNC: 25 MMOL/L (ref 20–32)
CREAT SERPL-MCNC: 1.94 MG/DL (ref 0.66–1.25)
DIFFERENTIAL METHOD BLD: ABNORMAL
EOSINOPHIL # BLD AUTO: 0 10E9/L (ref 0–0.7)
EOSINOPHIL NFR BLD AUTO: 0 %
ERYTHROCYTE [DISTWIDTH] IN BLOOD BY AUTOMATED COUNT: 16.5 % (ref 10–15)
GFR SERPL CREATININE-BSD FRML MDRD: 35 ML/MIN/{1.73_M2}
GLUCOSE SERPL-MCNC: 86 MG/DL (ref 70–99)
HCT VFR BLD AUTO: 26.4 % (ref 40–53)
HGB BLD-MCNC: 8.8 G/DL (ref 13.3–17.7)
IMM GRANULOCYTES # BLD: 0 10E9/L (ref 0–0.4)
IMM GRANULOCYTES NFR BLD: 0.3 %
LYMPHOCYTES # BLD AUTO: 0.4 10E9/L (ref 0.8–5.3)
LYMPHOCYTES NFR BLD AUTO: 10.6 %
MCH RBC QN AUTO: 30.6 PG (ref 26.5–33)
MCHC RBC AUTO-ENTMCNC: 33.3 G/DL (ref 31.5–36.5)
MCV RBC AUTO: 92 FL (ref 78–100)
MONOCYTES # BLD AUTO: 0.7 10E9/L (ref 0–1.3)
MONOCYTES NFR BLD AUTO: 17.3 %
NEUTROPHILS # BLD AUTO: 2.7 10E9/L (ref 1.6–8.3)
NEUTROPHILS NFR BLD AUTO: 70.7 %
NRBC # BLD AUTO: 0 10*3/UL
NRBC BLD AUTO-RTO: 0 /100
PHOSPHATE SERPL-MCNC: 4.4 MG/DL (ref 2.5–4.5)
PLATELET # BLD AUTO: 162 10E9/L (ref 150–450)
POTASSIUM SERPL-SCNC: 4.1 MMOL/L (ref 3.4–5.3)
RBC # BLD AUTO: 2.88 10E12/L (ref 4.4–5.9)
SODIUM SERPL-SCNC: 136 MMOL/L (ref 133–144)
WBC # BLD AUTO: 3.8 10E9/L (ref 4–11)

## 2020-06-23 PROCEDURE — 99233 SBSQ HOSP IP/OBS HIGH 50: CPT | Performed by: PHYSICIAN ASSISTANT

## 2020-06-23 PROCEDURE — 93971 EXTREMITY STUDY: CPT | Mod: LT

## 2020-06-23 PROCEDURE — 80069 RENAL FUNCTION PANEL: CPT | Performed by: INTERNAL MEDICINE

## 2020-06-23 PROCEDURE — 36415 COLL VENOUS BLD VENIPUNCTURE: CPT | Performed by: INTERNAL MEDICINE

## 2020-06-23 PROCEDURE — 25000132 ZZH RX MED GY IP 250 OP 250 PS 637: Mod: GY | Performed by: PSYCHIATRY & NEUROLOGY

## 2020-06-23 PROCEDURE — 25000132 ZZH RX MED GY IP 250 OP 250 PS 637: Mod: GY | Performed by: HOSPITALIST

## 2020-06-23 PROCEDURE — 12000000 ZZH R&B MED SURG/OB

## 2020-06-23 PROCEDURE — 85025 COMPLETE CBC W/AUTO DIFF WBC: CPT | Performed by: INTERNAL MEDICINE

## 2020-06-23 RX ORDER — LIDOCAINE 40 MG/G
CREAM TOPICAL
Status: CANCELLED | OUTPATIENT
Start: 2020-06-23

## 2020-06-23 RX ORDER — DEXTROSE MONOHYDRATE 25 G/50ML
25-50 INJECTION, SOLUTION INTRAVENOUS
Status: CANCELLED | OUTPATIENT
Start: 2020-06-23

## 2020-06-23 RX ORDER — NICOTINE POLACRILEX 4 MG
15-30 LOZENGE BUCCAL
Status: CANCELLED | OUTPATIENT
Start: 2020-06-23

## 2020-06-23 RX ADMIN — HYDROXYZINE HYDROCHLORIDE 100 MG: 50 TABLET, FILM COATED ORAL at 21:35

## 2020-06-23 RX ADMIN — PANTOPRAZOLE SODIUM 40 MG: 40 TABLET, DELAYED RELEASE ORAL at 06:32

## 2020-06-23 RX ADMIN — QUETIAPINE FUMARATE 100 MG: 50 TABLET ORAL at 19:01

## 2020-06-23 RX ADMIN — TAMSULOSIN HYDROCHLORIDE 0.4 MG: 0.4 CAPSULE ORAL at 20:18

## 2020-06-23 RX ADMIN — HYDROXYZINE HYDROCHLORIDE 100 MG: 50 TABLET, FILM COATED ORAL at 08:30

## 2020-06-23 RX ADMIN — MIRTAZAPINE 30 MG: 15 TABLET, FILM COATED ORAL at 21:35

## 2020-06-23 RX ADMIN — MULTIPLE VITAMINS W/ MINERALS TAB 1 TABLET: TAB at 08:30

## 2020-06-23 RX ADMIN — DOCUSATE SODIUM AND SENNOSIDES 2 TABLET: 8.6; 5 TABLET, FILM COATED ORAL at 08:29

## 2020-06-23 RX ADMIN — HYDROXYZINE HYDROCHLORIDE 100 MG: 50 TABLET, FILM COATED ORAL at 16:20

## 2020-06-23 RX ADMIN — DOCUSATE SODIUM AND SENNOSIDES 2 TABLET: 8.6; 5 TABLET, FILM COATED ORAL at 20:18

## 2020-06-23 RX ADMIN — QUETIAPINE FUMARATE 100 MG: 50 TABLET ORAL at 04:15

## 2020-06-23 RX ADMIN — QUETIAPINE FUMARATE 50 MG: 50 TABLET ORAL at 10:23

## 2020-06-23 RX ADMIN — QUETIAPINE FUMARATE 50 MG: 50 TABLET ORAL at 11:12

## 2020-06-23 RX ADMIN — PANTOPRAZOLE SODIUM 40 MG: 40 TABLET, DELAYED RELEASE ORAL at 16:21

## 2020-06-23 RX ADMIN — TRAZODONE HYDROCHLORIDE 100 MG: 50 TABLET ORAL at 21:35

## 2020-06-23 RX ADMIN — VORTIOXETINE 10 MG: 10 TABLET, FILM COATED ORAL at 08:30

## 2020-06-23 RX ADMIN — FLUTICASONE FUROATE AND VILANTEROL TRIFENATATE 1 PUFF: 200; 25 POWDER RESPIRATORY (INHALATION) at 08:36

## 2020-06-23 RX ADMIN — FOLIC ACID 1 MG: 1 TABLET ORAL at 08:29

## 2020-06-23 ASSESSMENT — ACTIVITIES OF DAILY LIVING (ADL)
ADLS_ACUITY_SCORE: 11

## 2020-06-23 ASSESSMENT — MIFFLIN-ST. JEOR: SCORE: 1575.52

## 2020-06-23 NOTE — PROGRESS NOTES
[facility]    Medicine History and Physical - Hospitalist Service       Date of Admission:  6/10/2020    Assessment & Plan   Jim Richard is a 65 year old male with PMH significant for alcohol abuse, alcoholic cirrhosis with ascites, JANIS, depression and anxiety, history of G.I. bleed with esophageal varices who presented to ER with alcohol intoxication and suicidal ideation. While in ED he also reported some dark stools and admitted for further evaluation of G.I. bleed.    Left lower extremity swelling.  Hx PAD bypass same leg. Swelling, redness, pain compared to left.  - U/s to rule out dvt.    1. Alcoholic cirrhosis with ascites.  2. Alcohol abuse/dependence. Blood alcohol level on admission 0.32. CIWA monitored, no withdrawal. Clonidine discontinued.  PTA Lasix and Aldactone held on admission given worsening renal function. Lasix and Aldactone resumed 6/16 at low dose followed by slight increase in creatinine. Subsequently Lasix, then spirono nagain held.  - S/p paracentesis 6/10 w/ 4L off and 6/15 w/ 4L off again.  - Lasix on hold since 6/18 and spironolactone on hold since 6/20.  - Needs large volume paracentesis prior to discharge and waiting until as close to day of discharge as possible.  -Commitment was revoked;  following -- working on [facility]. Reportedly [facility] can accept later this week.    Acute on CKD, stage 3.  Bbaseline creatinine around 1.3-1.5. Creatinine 1.7 on adm. Likely prerenal secondary to alcohol abuse and poor PO intake; held off on PTA Lasix, Aldactone and started on cautious IV hydration but renal function worsened; IVF d/abhijeet on 6/11 evening. Renal US (6/12) unrevealing except for some evidence of chronic urinary bladder outlet obstruction. Nephrology consulted. Received IV albumin.   - Cr stable 1.9-2.1, but off diuretics as above and now with inc LE edema + abd distention.  - ?Restart diuretics soon, will clarify  diuretic plan with nephrology.     Medication noncompliance. Had stopped taking all his medications recently PTA.     Anxiety and depression.  Evaluated by psych and restarted on PTA Remeron, Seroquel, trazodone, Trintellix which he has been non-compliant with were resumed per psychiatry recommendation.  Patient continued to feel his anxiety was not controlled.  Psychiatry was reconsulted, Seroquel and Atarax increased 6/18.    Suspected GI bleed with history of esophagitis and esophageal varices.  Chronic anemia and thrombocytopenia.  Hemodynamically stable. No further melena. Baseline hemoglobin 8-9; hb 9.6---9.0----8.3---stabilized around 8-8.5. [facility] consulted. EGD 6/11 noted again with grade 1 esophageal varices, moderate portal gastropathy. Received SBP prophylactic ceftriaxone, discontinued 6/13. Does have s/o iron deficiency; got IV iron per nephrology.  - continue Protonix PO bid     BPH. Flomax     Chronic non-O2 dpnt COPD. PTA inhalers     Breast tenderness. gynecomastia related to his cirrhosis.      Diet: Fluid restriction 1500 ML FLUID  2 Gram Sodium Diet    Leger Catheter: not present    DVT Prophylaxis: Low Risk/Ambulatory with no VTE prophylaxis indicated  Code Status: Full Code    Expected discharge: ?6/23, recommended to IRTC once safe disposition plan/ TCU bed available.    The patient's care was discussed with the Attending Physician, Dr. Ledesma and Patient.    JoAnna K. Barthell, PA-C  Hospitalist Service  [facility]    ______________________________________________________________________    Interval History    Legs more swollen today. Yes abd distention and inc ascites, still tolerable. No SOB.  [facility] reportedly can accept later this week.  Up about 9lb since adm.    Data reviewed today: I reviewed all medications, new labs and imaging results over the last 24 hours. I personally reviewed no images or EKG's today.    Physical Exam   Vital Signs: Temp: 98.6  F (37   C) Temp src: Oral BP: (!) 140/74 Pulse: 60 Heart Rate: 68 Resp: 18 SpO2: 94 % O2 Device: None (Room air)    Weight: 184 lbs 8 oz  Constitutional: Appears stated age, no acute distress.  Respiratory: Breath sounds CTA. No increased work of breathing.  Cardiovascular: RRR, no rub or murmur. 2+ peripheral edema L>R.  GI: Soft, non-tender. Distended from ascites. No rebound or guarding.  Skin: Warm, dry. Erythema, swelling, TTP lower leg LLE.    Medications       fluticasone-vilanterol  1 puff Inhalation Daily     folic acid  1 mg Oral Daily     hydrOXYzine  100 mg Oral TID     mirtazapine  30 mg Oral At Bedtime     multivitamin w/minerals  1 tablet Oral Daily     pantoprazole  40 mg Oral BID AC     senna-docusate  1 tablet Oral BID    Or     senna-docusate  2 tablet Oral BID     tamsulosin  0.4 mg Oral QPM     traZODone  100 mg Oral At Bedtime     vortioxetine  10 mg Oral Daily       Data   Recent Labs   Lab 06/23/20  0705 06/22/20  0616 06/21/20  0626  06/18/20  0629   WBC 3.8*  --   --   --   --    HGB 8.8*  --   --   --  8.8*   MCV 92  --   --   --   --      --   --   --  125*    137 134   < > 136   POTASSIUM 4.1 4.3 4.2   < > 4.2   CHLORIDE 106 108 105   < > 108   CO2 25 24 24   < > 26   BUN 32* 37* 36*   < > 36*   CR 1.94* 2.12* 1.94*   < > 1.96*   ANIONGAP 5 5 5   < > 2*   KEV 9.0 8.9 8.6   < > 8.7   GLC 86 91 80   < > 80   ALBUMIN 3.2*  --   --   --  2.9*   PROTTOTAL  --   --   --   --  6.3*   BILITOTAL  --   --   --   --  0.6   ALKPHOS  --   --   --   --  207*   ALT  --   --   --   --  40   AST  --   --   --   --  47*    < > = values in this interval not displayed.       Imaging:  No results found for this or any previous visit (from the past 24 hour(s)).

## 2020-06-23 NOTE — PROGRESS NOTES
[facility]     Renal Progress Note       SHORTHAND KEY FOR MY NOTES:  c = with, s = without, p = after, a = before, x = except, asx = asymptomatic, tx = transplant or treatment, sx = symptoms or symptomatic, cx = canceled or culture, rxn = reaction, yday = yesterday, nl = normal, abx = antibiotics, fxn = function, dx = diagnosis, dz = disease, m/h = melena/hematochezia, c/d/l/ha = cramping/dizziness/lightheadedness/headache, d/c = discharge or diarrhea/constipation, f/c/n/v = fevers/chills/nausea/vomiting, cp/sob = chest pain/shortness of breath, tbv = total body volume, rxn = reaction, tdc = tunneled dialysis catheter, pta = prior to admission, hd = hemodialysis, pd = peritoneal dialysis, hhd = home hemodialysis         Assessment/Plan:     1.  NGUYEN/CKD. Pt's cr is down to 1.9 and he is urinating fine s diuretics.  He is TBV up and wt is up since diuretics were held.    A.  Follow labs, uo, sx.  B.  Will need to start diuretics soon, but would prefer para first.    2.  Cirrhosis c ascites.  Pt's abd is more distended and tight and breathing is a bit harder.  This is a change from yday.  Given that he will need a larger volume para, would prefer that it is done soon so we can see the impact on his kidneys prior to discharge.    A.  Para tmrw?  Will d/w hospitalist team.  B.  Start diuretics soon.    3.  Anemia. Hb is stable at 8.8.  No signs of bleeding.  A.  Check hb and follow clinically.    4.  FEN.  Electrolytes are ok.        Interval History:     Pt has NICOLE and his abd is increasing in size, but no cp/sob at rest.  No f/c/n/v.  Urinating ok.            Medications and Allergies:       fluticasone-vilanterol  1 puff Inhalation Daily     folic acid  1 mg Oral Daily     hydrOXYzine  100 mg Oral TID     mirtazapine  30 mg Oral At Bedtime     multivitamin w/minerals  1 tablet Oral Daily     pantoprazole  40 mg Oral BID AC     senna-docusate  1 tablet Oral BID    Or     senna-docusate  2 tablet Oral  "BID     tamsulosin  0.4 mg Oral QPM     traZODone  100 mg Oral At Bedtime     vortioxetine  10 mg Oral Daily     Allergies   Allergen Reactions     Amlodipine Swelling     Lisinopril      Other reaction(s): Angioedema  Mouth and tongue swelling   Mouth and tongue swelling             Physical Exam:     Vitals were reviewed    Heart Rate: 68, Blood pressure (!) 140/74, pulse 60, temperature 98.6  F (37  C), temperature source Oral, resp. rate 18, height 1.702 m (5' 7\"), weight 83.7 kg (184 lb 8 oz), SpO2 94 %.  Wt Readings from Last 3 Encounters:   06/23/20 83.7 kg (184 lb 8 oz)   05/26/20 86.2 kg (190 lb)   05/03/20 84.1 kg (185 lb 6.4 oz)     Intake/Output Summary (Last 24 hours) at 6/23/2020 1455  Last data filed at 6/23/2020 1212  Gross per 24 hour   Intake 710 ml   Output 1150 ml   Net -440 ml     GENERAL APPEARANCE: pleasant, NAD, alert  HEENT:  eyes/ears/nose/neck grossly nl  RESP: lungs cta b c good efforts, no crackles  CV: RRR, nl S1/S2  ABDOMEN: o/s/nt, + distended  EXTREMITIES/SKIN: 1-2+ ble edema - L > R         Data:     CBC RESULTS:     Recent Labs   Lab 06/23/20  0705 06/18/20  0629   WBC 3.8*  --    RBC 2.88*  --    HGB 8.8* 8.8*   HCT 26.4* 26.8*    125*     Basic Metabolic Panel:  Recent Labs   Lab 06/23/20  0705 06/22/20  0616 06/21/20  0626 06/20/20  0731 06/19/20  0934 06/18/20  0629    137 134 136 137 136   POTASSIUM 4.1 4.3 4.2 4.0 4.0 4.2   CHLORIDE 106 108 105 106 106 108   CO2 25 24 24 25 25 26   BUN 32* 37* 36* 34* 33* 36*   CR 1.94* 2.12* 1.94* 1.99* 1.82* 1.96*   GLC 86 91 80 87 116* 80   KEV 9.0 8.9 8.6 8.5 8.3* 8.7     INRNo lab results found in last 7 days.   Attestation:   I have reviewed today's relevant vital signs, notes, medications, labs and imaging.    Christian Harvey MD  [facility] - Nephrology  [phone]  "

## 2020-06-23 NOTE — PLAN OF CARE
DATE & TIME: 6/23/20 9814-7125  Cognitive Concerns/ Orientation : A&Ox4  BEHAVIOR & AGGRESSION TOOL COLOR: Green  CIWA SCORE: 0  ABNL VS/O2: VSS on RA  MOBILITY: Ind, walks in halls frequently   PAIN MANAGMENT: Denies  DIET: 2g Na   BOWEL/BLADDER: Continent, no BM on shift  ABNL LAB/BG: WBC 3.8 Creat 1.94  DRAIN/DEVICES: No IV access  TELEMETRY RHYTHM: NA  SKIN: Intact, bruised  TESTS/PROCEDURES: US to LLE to rule ot DVT  D/C DAY/GOALS/PLACE: Pending placement   OTHER IMPORTANT INFO: Phone assessment by Pricilla from [facility] [place] 6/22. PRN Seroquel for anxiety before US.

## 2020-06-23 NOTE — PROGRESS NOTES
SW:  D:  Patient reports he speak  with [facility] staff yesterday as part of the admission process.  He stated Pricilla thought he could be admitted later this week or early next week.    PLAN:  Writer will be speaking with [facility]- his commitment CM today for an update.

## 2020-06-23 NOTE — PLAN OF CARE
DATE & TIME: 6/22/20 3460-6339   Cognitive Concerns/ Orientation : A&Ox4, PRN Seroquel given x1 for anxiety, Wanted bedtime dose as well.    BEHAVIOR & AGGRESSION TOOL COLOR: Green  CIWA SCORE: 0  ABNL VS/O2: VSS on room air  MOBILITY: Ind, walks in halls frequently   PAIN MANAGMENT: Denied  DIET: Reg, 1500 FR  BOWEL/BLADDER: Continent  ABNL LAB/BG: Cr 2.12  DRAIN/DEVICES: No IV access  TELEMETRY RHYTHM: NA  SKIN: Intact, bruised  TESTS/PROCEDURES: Plan for paracentesis right before discharge   D/C DAY/GOALS/PLACE: Pending placement, needs TCU and IRTC  OTHER IMPORTANT INFO: Phone assessment by Pricilla from [facility] today.   COMMIT TO SIT DONE AND SIGNED OFF: Done

## 2020-06-23 NOTE — PROGRESS NOTES
"DATE & TIME: 6/22 night shift  Cognitive Concerns/ Orientation : A&Ox4, PRN Seroquel given for anxiety x1- pt up most the night \"I can't calm my mind down\"  BEHAVIOR & AGGRESSION TOOL COLOR: Green  CIWA SCORE: 0  ABNL VS/O2: VSS on room air  MOBILITY: Ind, walks in halls frequently   PAIN MANAGMENT: Denied  DIET: Reg, 1500 FR  BOWEL/BLADDER: Continent, no BM on shift  ABNL LAB/BG: Cr 2.12  DRAIN/DEVICES: No IV access  TELEMETRY RHYTHM: NA  SKIN: Intact, bruised  TESTS/PROCEDURES: Plan for paracentesis right before discharge   D/C DAY/GOALS/PLACE: pending placement   OTHER IMPORTANT INFO: Phone assessment by Pricilla from [facility] 6/22   COMMIT TO SIT DONE AND SIGNED OFF: Done  "

## 2020-06-24 LAB
ANION GAP SERPL CALCULATED.3IONS-SCNC: 7 MMOL/L (ref 3–14)
BASOPHILS # BLD AUTO: 0 10E9/L (ref 0–0.2)
BASOPHILS NFR BLD AUTO: 1 %
BUN SERPL-MCNC: 30 MG/DL (ref 7–30)
CALCIUM SERPL-MCNC: 8.9 MG/DL (ref 8.5–10.1)
CHLORIDE SERPL-SCNC: 104 MMOL/L (ref 94–109)
CO2 SERPL-SCNC: 25 MMOL/L (ref 20–32)
CREAT SERPL-MCNC: 1.83 MG/DL (ref 0.66–1.25)
DIFFERENTIAL METHOD BLD: ABNORMAL
EOSINOPHIL # BLD AUTO: 0 10E9/L (ref 0–0.7)
EOSINOPHIL NFR BLD AUTO: 0 %
ERYTHROCYTE [DISTWIDTH] IN BLOOD BY AUTOMATED COUNT: 16.1 % (ref 10–15)
GFR SERPL CREATININE-BSD FRML MDRD: 38 ML/MIN/{1.73_M2}
GLUCOSE SERPL-MCNC: 88 MG/DL (ref 70–99)
HCT VFR BLD AUTO: 26.9 % (ref 40–53)
HGB BLD-MCNC: 9 G/DL (ref 13.3–17.7)
IMM GRANULOCYTES # BLD: 0 10E9/L (ref 0–0.4)
IMM GRANULOCYTES NFR BLD: 0 %
LYMPHOCYTES # BLD AUTO: 0.4 10E9/L (ref 0.8–5.3)
LYMPHOCYTES NFR BLD AUTO: 9.9 %
MCH RBC QN AUTO: 30.6 PG (ref 26.5–33)
MCHC RBC AUTO-ENTMCNC: 33.5 G/DL (ref 31.5–36.5)
MCV RBC AUTO: 92 FL (ref 78–100)
MONOCYTES # BLD AUTO: 0.4 10E9/L (ref 0–1.3)
MONOCYTES NFR BLD AUTO: 8.7 %
NEUTROPHILS # BLD AUTO: 3.4 10E9/L (ref 1.6–8.3)
NEUTROPHILS NFR BLD AUTO: 80.4 %
NRBC # BLD AUTO: 0 10*3/UL
NRBC BLD AUTO-RTO: 0 /100
PLATELET # BLD AUTO: 167 10E9/L (ref 150–450)
POTASSIUM SERPL-SCNC: 4 MMOL/L (ref 3.4–5.3)
RBC # BLD AUTO: 2.94 10E12/L (ref 4.4–5.9)
SODIUM SERPL-SCNC: 136 MMOL/L (ref 133–144)
WBC # BLD AUTO: 4.2 10E9/L (ref 4–11)

## 2020-06-24 PROCEDURE — 25000132 ZZH RX MED GY IP 250 OP 250 PS 637: Mod: GY | Performed by: HOSPITALIST

## 2020-06-24 PROCEDURE — 36415 COLL VENOUS BLD VENIPUNCTURE: CPT | Performed by: INTERNAL MEDICINE

## 2020-06-24 PROCEDURE — 12000000 ZZH R&B MED SURG/OB

## 2020-06-24 PROCEDURE — 25000128 H RX IP 250 OP 636: Performed by: HOSPITALIST

## 2020-06-24 PROCEDURE — 85025 COMPLETE CBC W/AUTO DIFF WBC: CPT | Performed by: INTERNAL MEDICINE

## 2020-06-24 PROCEDURE — 25000132 ZZH RX MED GY IP 250 OP 250 PS 637: Mod: GY | Performed by: PSYCHIATRY & NEUROLOGY

## 2020-06-24 PROCEDURE — 80048 BASIC METABOLIC PNL TOTAL CA: CPT | Performed by: INTERNAL MEDICINE

## 2020-06-24 PROCEDURE — 99232 SBSQ HOSP IP/OBS MODERATE 35: CPT | Performed by: HOSPITALIST

## 2020-06-24 RX ADMIN — QUETIAPINE FUMARATE 100 MG: 50 TABLET ORAL at 10:24

## 2020-06-24 RX ADMIN — VORTIOXETINE 10 MG: 10 TABLET, FILM COATED ORAL at 08:17

## 2020-06-24 RX ADMIN — FOLIC ACID 1 MG: 1 TABLET ORAL at 08:18

## 2020-06-24 RX ADMIN — HYDROXYZINE HYDROCHLORIDE 100 MG: 50 TABLET, FILM COATED ORAL at 15:51

## 2020-06-24 RX ADMIN — TRAZODONE HYDROCHLORIDE 100 MG: 50 TABLET ORAL at 22:04

## 2020-06-24 RX ADMIN — QUETIAPINE FUMARATE 100 MG: 50 TABLET ORAL at 16:39

## 2020-06-24 RX ADMIN — ONDANSETRON 4 MG: 4 TABLET, ORALLY DISINTEGRATING ORAL at 06:52

## 2020-06-24 RX ADMIN — QUETIAPINE FUMARATE 50 MG: 50 TABLET ORAL at 22:08

## 2020-06-24 RX ADMIN — HYDROXYZINE HYDROCHLORIDE 100 MG: 50 TABLET, FILM COATED ORAL at 22:04

## 2020-06-24 RX ADMIN — DOCUSATE SODIUM 50 MG AND SENNOSIDES 8.6 MG 1 TABLET: 8.6; 5 TABLET, FILM COATED ORAL at 08:19

## 2020-06-24 RX ADMIN — PANTOPRAZOLE SODIUM 40 MG: 40 TABLET, DELAYED RELEASE ORAL at 15:51

## 2020-06-24 RX ADMIN — QUETIAPINE FUMARATE 100 MG: 50 TABLET ORAL at 04:20

## 2020-06-24 RX ADMIN — MULTIPLE VITAMINS W/ MINERALS TAB 1 TABLET: TAB at 08:18

## 2020-06-24 RX ADMIN — FLUTICASONE FUROATE AND VILANTEROL TRIFENATATE 1 PUFF: 200; 25 POWDER RESPIRATORY (INHALATION) at 08:19

## 2020-06-24 RX ADMIN — TAMSULOSIN HYDROCHLORIDE 0.4 MG: 0.4 CAPSULE ORAL at 22:04

## 2020-06-24 RX ADMIN — MIRTAZAPINE 30 MG: 15 TABLET, FILM COATED ORAL at 22:04

## 2020-06-24 RX ADMIN — HYDROXYZINE HYDROCHLORIDE 100 MG: 50 TABLET, FILM COATED ORAL at 08:17

## 2020-06-24 RX ADMIN — PANTOPRAZOLE SODIUM 40 MG: 40 TABLET, DELAYED RELEASE ORAL at 06:31

## 2020-06-24 ASSESSMENT — MIFFLIN-ST. JEOR: SCORE: 1572.63

## 2020-06-24 ASSESSMENT — ACTIVITIES OF DAILY LIVING (ADL)
ADLS_ACUITY_SCORE: 11

## 2020-06-24 NOTE — PLAN OF CARE
DATE & TIME: 6/23/20 9085-6865  Cognitive Concerns/ Orientation : A&Ox4  BEHAVIOR & AGGRESSION TOOL COLOR: Green  CIWA SCORE: 0  ABNL VS/O2: VSS on RA  MOBILITY: Ind, walks in halls frequently   PAIN MANAGMENT: C/o of some lower back pain, declines pain meds, ice packs help  DIET: 2g Na, FR 1500  BOWEL/BLADDER: Continent, BM this AM (per Pt)  ABNL LAB/BG: WBC 3.8, Creat 1.94, Hgb 8.8  DRAIN/DEVICES: No IV access  TELEMETRY RHYTHM: NA  SKIN: Intact, bruised  TESTS/PROCEDURES: US to LLE to rule ot DVT, no clot found. Plan for paracentesis tomorrow.   D/C DAY/GOALS/PLACE: Pending placement, possibly later this week.   OTHER IMPORTANT INFO: Phone assessment by Pricilla from [place] 6/22. PRN Seroquel Q6 given x1.

## 2020-06-24 NOTE — PROGRESS NOTES
DATE & TIME: 6/23/20 night shift   Cognitive Concerns/ Orientation : A&Ox4- calm and cooperative  BEHAVIOR & AGGRESSION TOOL COLOR: Green  CIWA SCORE: 0  ABNL VS/O2: VSS on RA, denies SOB  MOBILITY: Independent, walking the halls frequently    PAIN MANAGMENT: denied pain on shift, per report some minor lower back pain during the day- ice packs effective   DIET: 2g Na, FR 1500  BOWEL/BLADDER: Continent, no BM on shift   ABNL LAB/BG: WBC 3.8, Creat 1.94, Hgb 8.8  DRAIN/DEVICES: No IV access  TELEMETRY RHYTHM: NA  SKIN: Intact, bruised  TESTS/PROCEDURES: US to LLE to rule ot DVT, no clot found. Plan for paracentesis today 6/24  D/C DAY/GOALS/PLACE: Pending placement, possibly later this week.   OTHER IMPORTANT INFO: Phone assessment by Pricilla from [facility] 6/22. PRN Seroquel Q6 given x1.     0645: Pt complaining of nausea feeling like he needs to throw up. PRN zofran given.

## 2020-06-24 NOTE — PROGRESS NOTES
[facility]  Progress note- Hospitalist Service       Date of Admission:  6/10/2020    Assessment & Plan   Jim Richard is a 65 year old male with PMH significant for alcohol abuse, alcoholic cirrhosis with ascites, JANIS, depression and anxiety, history of G.I. bleed with esophageal varices who presented to ER with alcohol intoxication and suicidal ideation. While in ED he also reported some dark stools and admitted for further evaluation of G.I. bleed.    Left lower extremity swelling.  Hx PAD bypass same leg. Swelling, redness, pain    - U/s ruled out dvt.    Alcoholic cirrhosis with ascites.  Alcohol abuse/dependence. Blood alcohol level on admission 0.32. CIWA monitored, no withdrawal. Clonidine discontinued.  PTA Lasix and Aldactone held on admission given worsening renal function. Lasix and Aldactone resumed 6/16 at low dose followed by slight increase in creatinine. Subsequently Lasix, then aldactone held.  - S/p paracentesis 6/10 w/ 4L off and 6/15 w/ 4L off again.  - Lasix and spironolactone on hold as above  - Needs large volume paracentesis prior to discharge and waiting until as close to day of discharge as possible, planned today  - Commitment was revoked;  following -- [facility]. Reportedly [facility] can accept later this week.    Acute on CKD, stage 3.  Bbaseline creatinine around 1.3-1.5. Creatinine 1.7 on adm. Likely prerenal secondary to alcohol abuse and poor PO intake; held off on PTA Lasix, Aldactone and started on cautious IV hydration but renal function worsened; IVF d/abhijeet on 6/11 evening. Renal US (6/12) unrevealing except for some evidence of chronic urinary bladder outlet obstruction. Nephrology consulted. Received IV albumin.   - Cr stable 1.9-2.1, but off diuretics as above and now with inc LE edema + abd distention.  - ?Restart diuretics soon, will clarify diuretic plan with nephrology.     Medication  noncompliance. Had stopped taking all his medications recently PTA.     Anxiety and depression.  Evaluated by psych and restarted on PTA Remeron, Seroquel, trazodone, Trintellix which he has been non-compliant with were resumed per psychiatry recommendation.  Patient continued to feel his anxiety was not controlled.  Psychiatry was reconsulted, Seroquel and Atarax increased 6/18.    Suspected GI bleed with history of esophagitis and esophageal varices.  Chronic anemia and thrombocytopenia.  Hemodynamically stable. No further melena. Baseline hemoglobin 8-9; stabilized around 8-8.5. [facility] consulted. EGD 6/11 noted again with grade 1 esophageal varices, moderate portal gastropathy. Received SBP prophylactic ceftriaxone, discontinued 6/13. Does have s/o iron deficiency; got IV iron per nephrology.  - continue Protonix PO bid     BPH. Flomax     Chronic non-O2 dpnt COPD. PTA inhalers     Breast tenderness. gynecomastia related to his cirrhosis.     Lt hip pain  Appears to have greater trochanteric bursitis. Avoid nsaids given liver disease. Cold pack, avoid pressure to the area.      Diet: Fluid restriction 1500 ML FLUID  2 Gram Sodium Diet    Leger Catheter: not present    DVT Prophylaxis: Low Risk/Ambulatory with no VTE prophylaxis indicated  Code Status: Full Code    Expected discharge: To [facility] when accepted.  Medically stable, he is going to need paracentesis as outpatient as well.      Rebecca Huber MD  Hospitalist Service  [facility]    ______________________________________________________________________    Interval History    Reports abdominal fullness and discomfort, but denies abdominal pain, nausea, dyspnea.      Reports mild pain over the left greater trochanter.    Data reviewed today: I reviewed all medications, new labs  results over the last 24 hours. I personally reviewed no images or EKG's today.    Physical Exam   Vital Signs: Temp: 98.8  F (37.1  C) Temp src: Oral BP:  112/60   Heart Rate: 67 Resp: 18 SpO2: 95 % O2 Device: None (Room air)    Weight: 183 lbs 13.82 oz     Constitutional: AAOx3, comfortable  Respiratory: Breath sounds CTA. No increased work of breathing.  Cardiovascular: RRR, no rub or murmur. 2+ peripheral edema L>R.  GI: Soft, non-tender. Distended from ascites. No rebound or guarding.  Skin: Warm, dry.      Medications       fluticasone-vilanterol  1 puff Inhalation Daily     folic acid  1 mg Oral Daily     hydrOXYzine  100 mg Oral TID     mirtazapine  30 mg Oral At Bedtime     multivitamin w/minerals  1 tablet Oral Daily     pantoprazole  40 mg Oral BID AC     senna-docusate  1 tablet Oral BID    Or     senna-docusate  2 tablet Oral BID     tamsulosin  0.4 mg Oral QPM     traZODone  100 mg Oral At Bedtime     vortioxetine  10 mg Oral Daily       Data   Recent Labs   Lab 06/24/20  0630 06/23/20  0705 06/22/20  0616  06/18/20  0629   WBC 4.2 3.8*  --   --   --    HGB 9.0* 8.8*  --   --  8.8*   MCV 92 92  --   --   --     162  --   --  125*    136 137   < > 136   POTASSIUM 4.0 4.1 4.3   < > 4.2   CHLORIDE 104 106 108   < > 108   CO2 25 25 24   < > 26   BUN 30 32* 37*   < > 36*   CR 1.83* 1.94* 2.12*   < > 1.96*   ANIONGAP 7 5 5   < > 2*   KEV 8.9 9.0 8.9   < > 8.7   GLC 88 86 91   < > 80   ALBUMIN  --  3.2*  --   --  2.9*   PROTTOTAL  --   --   --   --  6.3*   BILITOTAL  --   --   --   --  0.6   ALKPHOS  --   --   --   --  207*   ALT  --   --   --   --  40   AST  --   --   --   --  47*    < > = values in this interval not displayed.       Imaging:  Recent Results (from the past 24 hour(s))   US Lower Extremity Venous Duplex Left    Narrative    VENOUS ULTRASOUND LEFT LEG  6/23/2020 10:47 AM     HISTORY: Left lower leg redness, swelling, pain    COMPARISON: None.    FINDINGS:  Examination of the deep veins with graded compression and  color flow Doppler with spectral wave form analysis was performed.   There is no evidence for DVT in the left lower  extremity.      Impression    IMPRESSION: No evidence of deep venous thrombosis.    GARLAND PEACOCK MD

## 2020-06-24 NOTE — PROGRESS NOTES
"[facility]     Renal Progress Note                Assessment/Plan:     1.  NGUYEN/CKD - Cr down to 1.8 . Decision re: paracentesis being made. If no plans for para today , recc starting furosemide 40 mg daily . Hold SPL for now.     2.  Cirrhosis c ascites. - primary team plans paracentesis closer to discharge. Keep paracentesis vol < 4 L , given albumin with para.     3.  Anemia. Hb is stable at 9.  No signs of bleeding.  A.  Check hb and follow clinically.    4.  FEN.  Electrolytes are ok.        Interval History:     Walking around without dyspnea. Feels okay . Abd is distended but reports it is not bothering him. No nausea, vomiting. Cr is improving.  Lytes okay . Afebrile.           Medications and Allergies:       fluticasone-vilanterol  1 puff Inhalation Daily     folic acid  1 mg Oral Daily     hydrOXYzine  100 mg Oral TID     mirtazapine  30 mg Oral At Bedtime     multivitamin w/minerals  1 tablet Oral Daily     pantoprazole  40 mg Oral BID AC     senna-docusate  1 tablet Oral BID    Or     senna-docusate  2 tablet Oral BID     tamsulosin  0.4 mg Oral QPM     traZODone  100 mg Oral At Bedtime     vortioxetine  10 mg Oral Daily     Allergies   Allergen Reactions     Amlodipine Swelling     Lisinopril      Other reaction(s): Angioedema  Mouth and tongue swelling   Mouth and tongue swelling             Physical Exam:     Vitals were reviewed    Heart Rate: 67, Blood pressure 112/60, pulse 60, temperature 98.8  F (37.1  C), temperature source Oral, resp. rate 18, height 1.702 m (5' 7\"), weight 83.4 kg (183 lb 13.8 oz), SpO2 95 %.  Wt Readings from Last 3 Encounters:   06/24/20 83.4 kg (183 lb 13.8 oz)   05/26/20 86.2 kg (190 lb)   05/03/20 84.1 kg (185 lb 6.4 oz)     GENERAL APPEARANCE: pleasant, NAD, alert  HEENT:  eyes/ears/nose/neck grossly nl  RESP: CTA b/l   CV: RRR, nl S1/S2  ABDOMEN: distended, ascites + , non tender   EXTREMITIES/SKIN: 1-2+ ble edema - L > R         Data:     CBC " RESULTS:     Recent Labs   Lab 06/24/20  0630 06/23/20  0705 06/18/20  0629   WBC 4.2 3.8*  --    RBC 2.94* 2.88*  --    HGB 9.0* 8.8* 8.8*   HCT 26.9* 26.4* 26.8*    162 125*     Basic Metabolic Panel:  Recent Labs   Lab 06/24/20  0630 06/23/20  0705 06/22/20  0616 06/21/20  0626 06/20/20  0731 06/19/20  0934    136 137 134 136 137   POTASSIUM 4.0 4.1 4.3 4.2 4.0 4.0   CHLORIDE 104 106 108 105 106 106   CO2 25 25 24 24 25 25   BUN 30 32* 37* 36* 34* 33*   CR 1.83* 1.94* 2.12* 1.94* 1.99* 1.82*   GLC 88 86 91 80 87 116*   KEV 8.9 9.0 8.9 8.6 8.5 8.3*     INRNo lab results found in last 7 days.   Attestation:   I have reviewed today's relevant vital signs, notes, medications, labs and imaging.  Alpesh Cifuentes MD  [facility] - Nephrology   [phone]

## 2020-06-24 NOTE — PLAN OF CARE
"DATE & TIME: 6/24/20 AM shift  Cognitive Concerns/ Orientation : A&Ox4- calm and cooperative  BEHAVIOR & AGGRESSION TOOL COLOR: Green  CIWA SCORE: n/a  ABNL VS/O2: VSS on RA, denies SOB at rest. States \"feel some what SOB when walking\"  MOBILITY: Independent, walking the halls frequently    PAIN MANAGMENT:c/o of L L back pain, cold applied.   DIET: 2g Na, FR 1500  BOWEL/BLADDER: Continent, one BM this shift   ABNL LAB/BG:Creat 1.84-3, Hgb 9.0  DRAIN/DEVICES: No IV access  TELEMETRY RHYTHM: NA  SKIN: Intact, bruised  TESTS/PROCEDURES: possible paracentesis tomorrow, but check with Rounding MD before sending pt.   D/C DAY/GOALS/PLACE: Pending placement, possibly later this week. SW is following with [facility].   OTHER IMPORTANT INFO: [facility] PRN Seroquel Q6 given x1. Nephrology is following     "

## 2020-06-25 ENCOUNTER — APPOINTMENT (OUTPATIENT)
Dept: ULTRASOUND IMAGING | Facility: CLINIC | Age: 65
DRG: 432 | End: 2020-06-25
Attending: HOSPITALIST
Payer: MEDICARE

## 2020-06-25 LAB
ANION GAP SERPL CALCULATED.3IONS-SCNC: 7 MMOL/L (ref 3–14)
BUN SERPL-MCNC: 29 MG/DL (ref 7–30)
CALCIUM SERPL-MCNC: 8.8 MG/DL (ref 8.5–10.1)
CHLORIDE SERPL-SCNC: 102 MMOL/L (ref 94–109)
CO2 SERPL-SCNC: 23 MMOL/L (ref 20–32)
CREAT SERPL-MCNC: 1.88 MG/DL (ref 0.66–1.25)
GFR SERPL CREATININE-BSD FRML MDRD: 36 ML/MIN/{1.73_M2}
GLUCOSE SERPL-MCNC: 82 MG/DL (ref 70–99)
POTASSIUM SERPL-SCNC: 3.8 MMOL/L (ref 3.4–5.3)
SODIUM SERPL-SCNC: 132 MMOL/L (ref 133–144)

## 2020-06-25 PROCEDURE — 25000132 ZZH RX MED GY IP 250 OP 250 PS 637: Mod: GY | Performed by: HOSPITALIST

## 2020-06-25 PROCEDURE — 80048 BASIC METABOLIC PNL TOTAL CA: CPT | Performed by: INTERNAL MEDICINE

## 2020-06-25 PROCEDURE — 99232 SBSQ HOSP IP/OBS MODERATE 35: CPT | Performed by: HOSPITALIST

## 2020-06-25 PROCEDURE — 25000128 H RX IP 250 OP 636: Performed by: HOSPITALIST

## 2020-06-25 PROCEDURE — 0W9G3ZZ DRAINAGE OF PERITONEAL CAVITY, PERCUTANEOUS APPROACH: ICD-10-PCS | Performed by: RADIOLOGY

## 2020-06-25 PROCEDURE — 27210190 US PARACENTESIS

## 2020-06-25 PROCEDURE — 12000000 ZZH R&B MED SURG/OB

## 2020-06-25 PROCEDURE — 25000132 ZZH RX MED GY IP 250 OP 250 PS 637: Mod: GY | Performed by: PSYCHIATRY & NEUROLOGY

## 2020-06-25 PROCEDURE — 36415 COLL VENOUS BLD VENIPUNCTURE: CPT | Performed by: INTERNAL MEDICINE

## 2020-06-25 RX ORDER — LIDOCAINE HYDROCHLORIDE 10 MG/ML
10 INJECTION, SOLUTION EPIDURAL; INFILTRATION; INTRACAUDAL; PERINEURAL ONCE
Status: COMPLETED | OUTPATIENT
Start: 2020-06-25 | End: 2020-06-25

## 2020-06-25 RX ORDER — FUROSEMIDE 40 MG
40 TABLET ORAL DAILY
Status: DISCONTINUED | OUTPATIENT
Start: 2020-06-25 | End: 2020-07-01

## 2020-06-25 RX ADMIN — ONDANSETRON 4 MG: 4 TABLET, ORALLY DISINTEGRATING ORAL at 05:56

## 2020-06-25 RX ADMIN — HYDROXYZINE HYDROCHLORIDE 100 MG: 50 TABLET, FILM COATED ORAL at 08:05

## 2020-06-25 RX ADMIN — QUETIAPINE FUMARATE 100 MG: 50 TABLET ORAL at 15:42

## 2020-06-25 RX ADMIN — LIDOCAINE HYDROCHLORIDE 10 ML: 10 INJECTION, SOLUTION EPIDURAL; INFILTRATION; INTRACAUDAL; PERINEURAL at 13:24

## 2020-06-25 RX ADMIN — QUETIAPINE FUMARATE 100 MG: 50 TABLET ORAL at 21:39

## 2020-06-25 RX ADMIN — HYDROXYZINE HYDROCHLORIDE 100 MG: 50 TABLET, FILM COATED ORAL at 15:42

## 2020-06-25 RX ADMIN — MIRTAZAPINE 30 MG: 15 TABLET, FILM COATED ORAL at 21:39

## 2020-06-25 RX ADMIN — DOCUSATE SODIUM AND SENNOSIDES 2 TABLET: 8.6; 5 TABLET, FILM COATED ORAL at 08:05

## 2020-06-25 RX ADMIN — QUETIAPINE FUMARATE 100 MG: 50 TABLET ORAL at 08:45

## 2020-06-25 RX ADMIN — VORTIOXETINE 10 MG: 10 TABLET, FILM COATED ORAL at 08:05

## 2020-06-25 RX ADMIN — FOLIC ACID 1 MG: 1 TABLET ORAL at 08:05

## 2020-06-25 RX ADMIN — TRAZODONE HYDROCHLORIDE 100 MG: 50 TABLET ORAL at 21:40

## 2020-06-25 RX ADMIN — HYDROXYZINE HYDROCHLORIDE 100 MG: 50 TABLET, FILM COATED ORAL at 21:39

## 2020-06-25 RX ADMIN — MULTIPLE VITAMINS W/ MINERALS TAB 1 TABLET: TAB at 08:05

## 2020-06-25 RX ADMIN — FLUTICASONE FUROATE AND VILANTEROL TRIFENATATE 1 PUFF: 200; 25 POWDER RESPIRATORY (INHALATION) at 08:04

## 2020-06-25 RX ADMIN — PANTOPRAZOLE SODIUM 40 MG: 40 TABLET, DELAYED RELEASE ORAL at 15:43

## 2020-06-25 RX ADMIN — TAMSULOSIN HYDROCHLORIDE 0.4 MG: 0.4 CAPSULE ORAL at 21:40

## 2020-06-25 RX ADMIN — PANTOPRAZOLE SODIUM 40 MG: 40 TABLET, DELAYED RELEASE ORAL at 06:43

## 2020-06-25 RX ADMIN — FUROSEMIDE 40 MG: 40 TABLET ORAL at 08:05

## 2020-06-25 ASSESSMENT — ACTIVITIES OF DAILY LIVING (ADL)
ADLS_ACUITY_SCORE: 11

## 2020-06-25 ASSESSMENT — MIFFLIN-ST. JEOR: SCORE: 1576.42

## 2020-06-25 NOTE — PROGRESS NOTES
[facility]  Progress note- Hospitalist Service       Date of Admission:  6/10/2020    Assessment & Plan   Jim Richard is a 65 year old male with PMH significant for alcohol abuse, alcoholic cirrhosis with ascites, JANIS, depression and anxiety, history of G.I. bleed with esophageal varices who presented to ER with alcohol intoxication and suicidal ideation. While in ED he also reported some dark stools and admitted for further evaluation of G.I. bleed.    Left lower extremity swelling.  Hx PAD bypass same leg. Swelling, redness, pain    - U/s ruled out dvt.    Alcoholic cirrhosis with ascites.  Alcohol abuse/dependence. Blood alcohol level on admission 0.32. CIWA monitored, no withdrawal. Clonidine discontinued.  PTA Lasix and Aldactone held on admission given worsening renal function. Lasix and Aldactone resumed 6/16 at low dose followed by slight increase in creatinine. Subsequently Lasix, then aldactone held.  - S/p paracentesis 6/10 w/ 4L off and 6/15 w/ 4L off again.  - Lasix resuming now, continue to hold spironolactone   - large volume paracentesis today    Acute on CKD, stage 3.  Bbaseline creatinine around 1.3-1.5. Creatinine 1.7 on adm. Likely prerenal secondary to alcohol abuse and poor PO intake; held off on PTA Lasix, Aldactone and started on cautious IV hydration but renal function worsened; IVF d/abhijeet on 6/11 evening. Renal US (6/12) unrevealing except for some evidence of chronic urinary bladder outlet obstruction. Nephrology consulted. Received IV albumin.   - Cr stable 1.8-1.9, resuming lasix. Monitor BMP     Medication noncompliance. Had stopped taking all his medications recently PTA.     Anxiety and depression.  Evaluated by psych and restarted on PTA Remeron, Seroquel, trazodone, Trintellix which he has been non-compliant with were resumed per psychiatry recommendation.  Patient continued to feel his anxiety was not controlled.  Psychiatry was reconsulted, Seroquel and  Atarax increased 6/18.    Suspected GI bleed with history of esophagitis and esophageal varices.  Chronic anemia and thrombocytopenia.  Hemodynamically stable. No further melena. Baseline hemoglobin 8-9; stabilized around 8-8.5. [facility] consulted. EGD 6/11 noted again with grade 1 esophageal varices, moderate portal gastropathy. Received SBP prophylactic ceftriaxone, discontinued 6/13. Does have s/o iron deficiency; got IV iron per nephrology.  - continue Protonix PO bid     BPH. Flomax     Chronic non-O2 dpnt COPD. PTA inhalers     Breast tenderness. gynecomastia related to his cirrhosis.     Lt hip pain  Appears to have greater trochanteric bursitis. Avoid nsaids given liver disease. Cold pack, avoid pressure to the area.      Diet: Fluid restriction 1500 ML FLUID  2 Gram Sodium Diet    Leger Catheter: not present    DVT Prophylaxis: Low Risk/Ambulatory with no VTE prophylaxis indicated  Code Status: Full Code    Expected discharge: To [facility] when accepted.  Medically stable, he is going to need paracentesis as outpatient as well.      Rebecca Huber MD  Hospitalist Service  [facility]    ______________________________________________________________________    Interval History      Abdominal fullness now with more discomfort but no pain. Feels distended abd is dragging his back forward and back pain, icing his back. No nausea, dyspnea.       Data reviewed today: I reviewed all medications, new labs  results over the last 24 hours. I personally reviewed no images or EKG's today.    Physical Exam   Vital Signs: Temp: 98.5  F (36.9  C) Temp src: Oral BP: (!) 143/74 Pulse: 66 Heart Rate: 68 Resp: 16 SpO2: 93 % O2 Device: None (Room air)    Weight: 184 lbs 11.2 oz     Constitutional: AAOx3, comfortable  Respiratory: Breath sounds CTA. No increased work of breathing.  Cardiovascular: RRR, no rub or murmur. 2+ peripheral edema L>R.  GI: Soft, non-tender. Distended from ascites. No rebound or  guarding.  Skin: Warm, dry.      Medications       fluticasone-vilanterol  1 puff Inhalation Daily     folic acid  1 mg Oral Daily     furosemide  40 mg Oral Daily     hydrOXYzine  100 mg Oral TID     mirtazapine  30 mg Oral At Bedtime     multivitamin w/minerals  1 tablet Oral Daily     pantoprazole  40 mg Oral BID AC     senna-docusate  1 tablet Oral BID    Or     senna-docusate  2 tablet Oral BID     tamsulosin  0.4 mg Oral QPM     traZODone  100 mg Oral At Bedtime     vortioxetine  10 mg Oral Daily       Data   Recent Labs   Lab 06/25/20  0555 06/24/20  0630 06/23/20  0705   WBC  --  4.2 3.8*   HGB  --  9.0* 8.8*   MCV  --  92 92   PLT  --  167 162   * 136 136   POTASSIUM 3.8 4.0 4.1   CHLORIDE 102 104 106   CO2 23 25 25   BUN 29 30 32*   CR 1.88* 1.83* 1.94*   ANIONGAP 7 7 5   KEV 8.8 8.9 9.0   GLC 82 88 86   ALBUMIN  --   --  3.2*       Imaging:  No results found for this or any previous visit (from the past 24 hour(s)).

## 2020-06-25 NOTE — PROGRESS NOTES
DATE & TIME: 6/24/20 night shift   Cognitive Concerns/ Orientation : A&Ox4- calm and cooperative  BEHAVIOR & AGGRESSION TOOL COLOR: Green  CIWA SCORE: NA  ABNL VS/O2: VSS on RA- slightly SOB w/ ambulation.   MOBILITY: Independent, walking the halls frequently    PAIN MANAGMENT: denied pain on shift   DIET: 2g Na, Fluid restrict 1500mL pt compliant   BOWEL/BLADDER: continent, no BM on shift   ABNL LAB/BG:Creat 1.83, Hgb 9.0  DRAIN/DEVICES: NO IV ACCESS  TELEMETRY RHYTHM: NA  SKIN: Intact, bruised  TESTS/PROCEDURES: possible paracentesis today 6/25, but check with Rounding MD before sending pt.   D/C DAY/GOALS/PLACE: Pending placement, possibly later this week. SW is following with [facility].   OTHER IMPORTANT INFO: Nephrology is following. Abd distended/firm. Continue to monitor

## 2020-06-25 NOTE — PLAN OF CARE
DATE & TIME: 06/25/20 7805-9940   Cognitive Concerns/ Orientation : A&Ox4  BEHAVIOR & AGGRESSION TOOL COLOR: Green  CIWA SCORE:   ABNL VS/O2: VSS on RA, elevated /74  MOBILITY: Ind  PAIN MANAGMENT: Denies   DIET: 2gm Na 1500 FR  BOWEL/BLADDER: Up to BR  ABNL LAB/BG: Creat 1.88 Na 132  DRAIN/DEVICES:   TELEMETRY RHYTHM:  SKIN: Bruised, edema to LLE  TESTS/PROCEDURES: Right sided paracentesis done with 3300 ml of clear re fluid removed, MD aware.  D/C DAY/GOALS/PLACE: Pending placement  OTHER IMPORTANT INFO:

## 2020-06-25 NOTE — PROGRESS NOTES
DANIELE  D:  Patient will receive a call tomorrow at 0900 from [facility] [phone].  She will be completing a provisional discharge paperwork with him over the phone.  This will be in place for when he goes to [facility], possibly next Monday.  His [facility] Omayra Coreen [phone] will call  at [phone] before Monday if she hears that [facility] can accept him on Monday.

## 2020-06-25 NOTE — PROGRESS NOTES
[facility]     Renal Progress Note       SHORTHAND KEY FOR MY NOTES:  c = with, s = without, p = after, a = before, x = except, asx = asymptomatic, tx = transplant or treatment, sx = symptoms or symptomatic, cx = canceled or culture, rxn = reaction, yday = yesterday, nl = normal, abx = antibiotics, fxn = function, dx = diagnosis, dz = disease, m/h = melena/hematochezia, c/d/l/ha = cramping/dizziness/lightheadedness/headache, d/c = discharge or diarrhea/constipation, f/c/n/v = fevers/chills/nausea/vomiting, cp/sob = chest pain/shortness of breath, tbv = total body volume, rxn = reaction, tdc = tunneled dialysis catheter, pta = prior to admission, hd = hemodialysis, pd = peritoneal dialysis, hhd = home hemodialysis         Assessment/Plan:     1.  NGUYEN/CKD. Pt's cr is stable ~1.9 p starting diuretics.  He is due for a large volume para today, so it will be impt to ensure that he gets enough alb afterwards to prevent hypotension and further kidney injury.  He remains TBV up.  A.  Follow labs, uo, sx.  B.  Continue furos at same dose.  Keep spirono on hold for now.    2.  Cirrhosis c ascites.  Pt's abd is pretty distended and it's affecting his breathing a bit.  Para today.  A.  Give alb p para.  For each liter p 3L, would give 12.5 g of 25% alb.  B.  Diuretics as above.    3.  Anemia. Hb is stable.  No bleeding noted.    A.  Follow hb, clinically.      4.  FEN.  Electrolytes are ok.        Interval History:     Pt is doing ok today.  He noticed an increased amt of urine p starting the furos.  His breathing is generally ok, but is limited by the abd distention.  He is due for a para later today.      He is eating ok and denies any metallic taste.  No n/v/itching.              Medications and Allergies:       fluticasone-vilanterol  1 puff Inhalation Daily     folic acid  1 mg Oral Daily     furosemide  40 mg Oral Daily     hydrOXYzine  100 mg Oral TID     mirtazapine  30 mg Oral At Bedtime      "multivitamin w/minerals  1 tablet Oral Daily     pantoprazole  40 mg Oral BID AC     senna-docusate  1 tablet Oral BID    Or     senna-docusate  2 tablet Oral BID     tamsulosin  0.4 mg Oral QPM     traZODone  100 mg Oral At Bedtime     vortioxetine  10 mg Oral Daily     Allergies   Allergen Reactions     Amlodipine Swelling     Lisinopril      Other reaction(s): Angioedema  Mouth and tongue swelling   Mouth and tongue swelling             Physical Exam:     Vitals were reviewed    Heart Rate: 68, Blood pressure (!) 143/74, pulse 66, temperature 98.5  F (36.9  C), temperature source Oral, resp. rate 16, height 1.702 m (5' 7\"), weight 83.8 kg (184 lb 11.2 oz), SpO2 93 %.  Wt Readings from Last 3 Encounters:   06/25/20 83.8 kg (184 lb 11.2 oz)   05/26/20 86.2 kg (190 lb)   05/03/20 84.1 kg (185 lb 6.4 oz)     Intake/Output Summary (Last 24 hours) at 6/25/2020 1217  Last data filed at 6/25/2020 0820  Gross per 24 hour   Intake 480 ml   Output 1900 ml   Net -1420 ml     GENERAL APPEARANCE: pleasant, NAD, alert, sitting up in chair  HEENT:  eyes/ears/nose/neck grossly nl  RESP: lungs cta b c good efforts, no crackles  CV: RRR, nl S1/S2  ABDOMEN: tight abd, + distended  EXTREMITIES/SKIN: 1-2+ ble edema - L > R         Data:     CBC RESULTS:     Recent Labs   Lab 06/24/20  0630 06/23/20  0705   WBC 4.2 3.8*   RBC 2.94* 2.88*   HGB 9.0* 8.8*   HCT 26.9* 26.4*    162     Basic Metabolic Panel:  Recent Labs   Lab 06/25/20  0555 06/24/20  0630 06/23/20  0705 06/22/20  0616 06/21/20  0626 06/20/20  0731   * 136 136 137 134 136   POTASSIUM 3.8 4.0 4.1 4.3 4.2 4.0   CHLORIDE 102 104 106 108 105 106   CO2 23 25 25 24 24 25   BUN 29 30 32* 37* 36* 34*   CR 1.88* 1.83* 1.94* 2.12* 1.94* 1.99*   GLC 82 88 86 91 80 87   KEV 8.8 8.9 9.0 8.9 8.6 8.5     INRNo lab results found in last 7 days.   Attestation:   I have reviewed today's relevant vital signs, notes, medications, labs and imaging.    Christian Harvey MD  [facility]phrology  [phone]

## 2020-06-25 NOTE — PROGRESS NOTES
Care Suites Procedure Nursing Note    Patient Information  Name: Jim Richard  Age: 65 year old    Procedure  Procedure: paracentesis  Procedure start time: 1320  Procedure complete time:   Concerns/abnormal assessment:   If abnormal assessment, provider notified: N/A  Plan/Other: after consent and time out done a right sided paracentesis done with 3300 ml of clear re fluid removed, VSS, pt tolerated well, bandage applied to site, CDI, pt transported back to room via cart by YEIMY, report called to VASQUEZ Palma on 66.    Kimi Weber, RN

## 2020-06-25 NOTE — PLAN OF CARE
DATE & TIME: 6/24/20 2402-2985   Cognitive Concerns/ Orientation : A&Ox4- calm and cooperative  BEHAVIOR & AGGRESSION TOOL COLOR: Green  CIWA SCORE: NA  ABNL VS/O2: VSS on RA- slightly SOB w/ ambulation.   MOBILITY: Independent, walking the halls frequently    PAIN MANAGMENT:c/o of L L back pain- cold applied.   DIET: 2g Na, Fluid restrict 1500mL pt compliant   BOWEL/BLADDER: Cont., 3 BM's today.   ABNL LAB/BG:Creat 1.83, Hgb 9.0  DRAIN/DEVICES: NO IV ACCESS  TELEMETRY RHYTHM: NA  SKIN: Intact, bruised  TESTS/PROCEDURES: possible paracentesis tomorrow, but check with Rounding MD before sending pt.   D/C DAY/GOALS/PLACE: Pending placement, possibly later this week. SW is following with [facility] [facility].   OTHER IMPORTANT INFO: PRN Seroquel Q6 given x1. Nephrology is following

## 2020-06-26 LAB
ANION GAP SERPL CALCULATED.3IONS-SCNC: 7 MMOL/L (ref 3–14)
BUN SERPL-MCNC: 32 MG/DL (ref 7–30)
CALCIUM SERPL-MCNC: 8.7 MG/DL (ref 8.5–10.1)
CHLORIDE SERPL-SCNC: 102 MMOL/L (ref 94–109)
CO2 SERPL-SCNC: 25 MMOL/L (ref 20–32)
CREAT SERPL-MCNC: 2.08 MG/DL (ref 0.66–1.25)
GFR SERPL CREATININE-BSD FRML MDRD: 32 ML/MIN/{1.73_M2}
GLUCOSE SERPL-MCNC: 132 MG/DL (ref 70–99)
POTASSIUM SERPL-SCNC: 3.9 MMOL/L (ref 3.4–5.3)
SODIUM SERPL-SCNC: 134 MMOL/L (ref 133–144)

## 2020-06-26 PROCEDURE — 25000128 H RX IP 250 OP 636: Performed by: HOSPITALIST

## 2020-06-26 PROCEDURE — 25000132 ZZH RX MED GY IP 250 OP 250 PS 637: Mod: GY | Performed by: PSYCHIATRY & NEUROLOGY

## 2020-06-26 PROCEDURE — 12000000 ZZH R&B MED SURG/OB

## 2020-06-26 PROCEDURE — 25000132 ZZH RX MED GY IP 250 OP 250 PS 637: Mod: GY | Performed by: HOSPITALIST

## 2020-06-26 PROCEDURE — 99232 SBSQ HOSP IP/OBS MODERATE 35: CPT | Performed by: HOSPITALIST

## 2020-06-26 PROCEDURE — 36415 COLL VENOUS BLD VENIPUNCTURE: CPT | Performed by: HOSPITALIST

## 2020-06-26 PROCEDURE — 99207 ZZC CDG-MDM COMPONENT: MEETS MODERATE - UP CODED: CPT | Performed by: HOSPITALIST

## 2020-06-26 PROCEDURE — 80048 BASIC METABOLIC PNL TOTAL CA: CPT | Performed by: HOSPITALIST

## 2020-06-26 PROCEDURE — 40000863 ZZH STATISTIC RADIOLOGY XRAY, US, CT, MAR, NM

## 2020-06-26 RX ADMIN — TRAZODONE HYDROCHLORIDE 100 MG: 50 TABLET ORAL at 21:09

## 2020-06-26 RX ADMIN — FLUTICASONE FUROATE AND VILANTEROL TRIFENATATE 1 PUFF: 200; 25 POWDER RESPIRATORY (INHALATION) at 08:06

## 2020-06-26 RX ADMIN — TAMSULOSIN HYDROCHLORIDE 0.4 MG: 0.4 CAPSULE ORAL at 21:09

## 2020-06-26 RX ADMIN — QUETIAPINE FUMARATE 100 MG: 50 TABLET ORAL at 08:14

## 2020-06-26 RX ADMIN — MIRTAZAPINE 30 MG: 15 TABLET, FILM COATED ORAL at 21:09

## 2020-06-26 RX ADMIN — HYDROXYZINE HYDROCHLORIDE 100 MG: 50 TABLET, FILM COATED ORAL at 08:07

## 2020-06-26 RX ADMIN — HYDROXYZINE HYDROCHLORIDE 100 MG: 50 TABLET, FILM COATED ORAL at 16:10

## 2020-06-26 RX ADMIN — VORTIOXETINE 10 MG: 10 TABLET, FILM COATED ORAL at 08:06

## 2020-06-26 RX ADMIN — PANTOPRAZOLE SODIUM 40 MG: 40 TABLET, DELAYED RELEASE ORAL at 06:31

## 2020-06-26 RX ADMIN — FOLIC ACID 1 MG: 1 TABLET ORAL at 08:07

## 2020-06-26 RX ADMIN — FUROSEMIDE 40 MG: 40 TABLET ORAL at 08:07

## 2020-06-26 RX ADMIN — ONDANSETRON 4 MG: 4 TABLET, ORALLY DISINTEGRATING ORAL at 06:32

## 2020-06-26 RX ADMIN — PANTOPRAZOLE SODIUM 40 MG: 40 TABLET, DELAYED RELEASE ORAL at 16:10

## 2020-06-26 RX ADMIN — DOCUSATE SODIUM AND SENNOSIDES 2 TABLET: 8.6; 5 TABLET, FILM COATED ORAL at 08:06

## 2020-06-26 RX ADMIN — QUETIAPINE FUMARATE 100 MG: 50 TABLET ORAL at 16:41

## 2020-06-26 RX ADMIN — MULTIPLE VITAMINS W/ MINERALS TAB 1 TABLET: TAB at 08:07

## 2020-06-26 RX ADMIN — DOCUSATE SODIUM AND SENNOSIDES 2 TABLET: 8.6; 5 TABLET, FILM COATED ORAL at 21:09

## 2020-06-26 RX ADMIN — HYDROXYZINE HYDROCHLORIDE 100 MG: 50 TABLET, FILM COATED ORAL at 21:09

## 2020-06-26 ASSESSMENT — MIFFLIN-ST. JEOR: SCORE: 1527.44

## 2020-06-26 ASSESSMENT — ACTIVITIES OF DAILY LIVING (ADL)
ADLS_ACUITY_SCORE: 11

## 2020-06-26 NOTE — PLAN OF CARE
DATE & TIME: 06/25/20 8766-8308            Cognitive Concerns/ Orientation : A&Ox4- anxious at times PRN seroquel avail.   BEHAVIOR & AGGRESSION TOOL COLOR: Green  CIWA SCORE: NA  ABNL VS/O2: VSS on RA, elevated /73  MOBILITY: Ind- walking in halls frequently   PAIN MANAGMENT: Denies   DIET: 2gm Na 1500 Fluid restriction   BOWEL/BLADDER: Up to BR  ABNL LAB/BG: Creat 1.88 Na 132  DRAIN/DEVICES: NO IV ACCESS  TELEMETRY RHYTHM:  SKIN: Bruised, edema to LLE  TESTS/PROCEDURES: Right sided paracentesis done today w/ 3.3L of clear re fluid removed  D/C DAY/GOALS/PLACE: Pending placement- possibly [place] next Mon.   OTHER IMPORTANT INFO: DANIELE following.

## 2020-06-26 NOTE — PROGRESS NOTES
BRIEF NUTRITION REASSESSMENT      REASON FOR ASSESSMENT:  Jim Richard is a 65 year old male seen by Registered Dietitian for follow-up    CURRENT DIET AND INTAKE:  Diet:  2 Gram Na diet + 1500 mL fluid restriction           Per flowsheet, intake 100% 2-3 meals per day.    NEW FINDINGS  Wt = 78.9 kg (173 lb 14.4 oz) - decrease 11# from 6/25(likely due to diuresing)  6/25 - Right sided paracentesis done with 3300 ml removed    Pt medically stable, awaiting discharge to [facility] when accepted, most likely coming Monday.     LABS:  Labs noted    MALNUTRITION:  Visual Nutrition Focused Physical Assessment (NFPA) not completed due to restrictions on face-to-face patient care during COVID-19 Pandemic. Do not suspect muscle/fat losses.  Patient does not meet two of the following criteria necessary for diagnosing malnutrition.     NUTRITION INTERVENTION:  Nutrition Diagnosis:  No nutrition diagnosis at this time.    Implementation:  Nutrition Education:  Per Provider order if indicated    FOLLOW UP/MONITORING:   Will re-evaluate in 7 - 10 days, or sooner, if re-consulted.    Adriana Burgess RDN, CRYSTAL    .rdfu

## 2020-06-26 NOTE — PROGRESS NOTES
[facility]     Renal Progress Note       SHORTHAND KEY FOR MY NOTES:  c = with, s = without, p = after, a = before, x = except, asx = asymptomatic, tx = transplant or treatment, sx = symptoms or symptomatic, cx = canceled or culture, rxn = reaction, yday = yesterday, nl = normal, abx = antibiotics, fxn = function, dx = diagnosis, dz = disease, m/h = melena/hematochezia, c/d/l/ha = cramping/dizziness/lightheadedness/headache, d/c = discharge or diarrhea/constipation, f/c/n/v = fevers/chills/nausea/vomiting, cp/sob = chest pain/shortness of breath, tbv = total body volume, rxn = reaction, tdc = tunneled dialysis catheter, pta = prior to admission, hd = hemodialysis, pd = peritoneal dialysis, hhd = home hemodialysis         Assessment/Plan:     1.  NGUYEN/CKD. Pt's cr is higher post-tap and is 2.1 today.  He is still TBV up and is tolerating the diuretics.    A.  Follow labs, uo, sx.  B.  If the cr continues to rise, we may need to adjust the diuretic dose.  Continue same dose for now.    2.  Cirrhosis c ascites.  Pt had a 3+L para yday.  Feels better from an abd discomfort and breathing point of view.  A.  Give alb p para.  For each liter p 3L, would give 12.5 g of 25% alb.  B.  Diuretics as above.    3.  Anemia. Hb is stable.  No bleeding noted.    A.  Follow hb, clinically.      4.  FEN.  Electrolytes are ok.        Interval History:     Pt is feeling ok.  No f/c/n/v.  Tolerated the -3L tap s probs - no c/d/l/ha and breathing has improved.  Eating ok.          Medications and Allergies:       fluticasone-vilanterol  1 puff Inhalation Daily     folic acid  1 mg Oral Daily     furosemide  40 mg Oral Daily     hydrOXYzine  100 mg Oral TID     mirtazapine  30 mg Oral At Bedtime     multivitamin w/minerals  1 tablet Oral Daily     pantoprazole  40 mg Oral BID AC     senna-docusate  1 tablet Oral BID    Or     senna-docusate  2 tablet Oral BID     tamsulosin  0.4 mg Oral QPM     traZODone  100 mg Oral At  "Bedtime     vortioxetine  10 mg Oral Daily     Allergies   Allergen Reactions     Amlodipine Swelling     Lisinopril      Other reaction(s): Angioedema  Mouth and tongue swelling   Mouth and tongue swelling             Physical Exam:     Vitals were reviewed    Heart Rate: 72, Blood pressure 135/69, pulse 70, temperature 97.9  F (36.6  C), temperature source Oral, resp. rate 16, height 1.702 m (5' 7\"), weight 78.9 kg (173 lb 14.4 oz), SpO2 93 %.  Wt Readings from Last 3 Encounters:   06/26/20 78.9 kg (173 lb 14.4 oz)   05/26/20 86.2 kg (190 lb)   05/03/20 84.1 kg (185 lb 6.4 oz)     Intake/Output Summary (Last 24 hours) at 6/26/2020 1151  Last data filed at 6/25/2020 1821  Gross per 24 hour   Intake 120 ml   Output --   Net 120 ml     GENERAL APPEARANCE: pleasant, NAD, alert, sitting up in chair  HEENT:  eyes/ears/nose/neck grossly nl  RESP: lungs cta b c good efforts, no crackles  CV: RRR, nl S1/S2  ABDOMEN: abd protuberant, but soft  EXTREMITIES/SKIN: 1-2+ ble edema - L > R (unchanged)         Data:     CBC RESULTS:     Recent Labs   Lab 06/24/20  0630 06/23/20  0705   WBC 4.2 3.8*   RBC 2.94* 2.88*   HGB 9.0* 8.8*   HCT 26.9* 26.4*    162     Basic Metabolic Panel:  Recent Labs   Lab 06/26/20  0823 06/25/20  0555 06/24/20  0630 06/23/20  0705 06/22/20  0616 06/21/20  0626    132* 136 136 137 134   POTASSIUM 3.9 3.8 4.0 4.1 4.3 4.2   CHLORIDE 102 102 104 106 108 105   CO2 25 23 25 25 24 24   BUN 32* 29 30 32* 37* 36*   CR 2.08* 1.88* 1.83* 1.94* 2.12* 1.94*   * 82 88 86 91 80   KEV 8.7 8.8 8.9 9.0 8.9 8.6     INRNo lab results found in last 7 days.   Attestation:   I have reviewed today's relevant vital signs, notes, medications, labs and imaging.    Christian Harvey MD  [facility] - Nephrology  [phone]  "

## 2020-06-26 NOTE — PROGRESS NOTES
[facility]  Progress note- Hospitalist Service       Date of Admission:  6/10/2020    Assessment & Plan   Jim Richard is a 65 year old male with PMH significant for alcohol abuse, alcoholic cirrhosis with ascites, JANIS, depression and anxiety, history of G.I. bleed with esophageal varices who presented to ER with alcohol intoxication and suicidal ideation. While in ED he also reported some dark stools and admitted for further evaluation of G.I. bleed.    Left lower extremity swelling.  Hx PAD bypass same leg. Swelling, redness, pain    - U/s ruled out dvt.    Alcoholic cirrhosis with ascites.  Alcohol abuse/dependence. Blood alcohol level on admission 0.32. CIWA monitored, no withdrawal. Clonidine discontinued.  PTA Lasix and Aldactone held on admission given worsening renal function. Lasix and Aldactone resumed 6/16 at low dose followed by slight increase in creatinine. Subsequently Lasix, then aldactone held.  - S/p paracentesis 6/10 w/ 4L off and 6/15 w/ 4L off  And 6/2 with 3.3 L off    - Lasix resumed but Cr worsened to 2.08, received am dose already, will review with nephro about possibly holding it from tomorrow. Continue to hold spironolactone     Acute on CKD, stage 3.  Bbaseline creatinine around 1.3-1.5. Creatinine 1.7 on adm. Likely prerenal secondary to alcohol abuse and poor PO intake; held off on PTA Lasix, Aldactone and started on cautious IV hydration but renal function worsened; IVF d/abhijeet on 6/11 evening. Renal US (6/12) unrevealing except for some evidence of chronic urinary bladder outlet obstruction. Nephrology consulted. Received IV albumin.   - Cr stable 1.8-1.9, resuming lasix. Monitor BMP     Medication noncompliance. Had stopped taking all his medications recently PTA.     Anxiety and depression.  Evaluated by psych and restarted on PTA Remeron, Seroquel, trazodone, Trintellix which he has been non-compliant with were resumed per psychiatry recommendation.  Patient  continued to feel his anxiety was not controlled.  Psychiatry was reconsulted, Seroquel and Atarax increased 6/18.    Suspected GI bleed with history of esophagitis and esophageal varices.  Chronic anemia and thrombocytopenia.  Hemodynamically stable. No further melena. Baseline hemoglobin 8-9; stabilized around 8-8.5. [facility] consulted. EGD 6/11 noted again with grade 1 esophageal varices, moderate portal gastropathy. Received SBP prophylactic ceftriaxone, discontinued 6/13. Does have s/o iron deficiency; got IV iron per nephrology.  - continue Protonix PO bid     BPH. Flomax     Chronic non-O2 dpnt COPD. PTA inhalers     Breast tenderness. gynecomastia related to his cirrhosis.     Lt hip pain  Appears to have greater trochanteric bursitis. Avoid nsaids given liver disease. Cold pack, avoid pressure to the area.      Diet: Fluid restriction 1500 ML FLUID  2 Gram Sodium Diet    Leger Catheter: not present    DVT Prophylaxis: Low Risk/Ambulatory with no VTE prophylaxis indicated  Code Status: Full Code    Expected discharge: To [facility] when accepted, most likely coming Monday. Medically stable, he is going to need paracentesis as outpatient as well.      Rebecca Huber MD  Hospitalist Service  [facility]    ______________________________________________________________________    Interval History    Less abdominal distension after paracentesis yesterday.   Creatinine increased on lasix, already received this am.   No other new symptoms.    Data reviewed today: I reviewed all medications, new labs  results over the last 24 hours. I personally reviewed no images or EKG's today.    Physical Exam   Vital Signs: Temp: 97.9  F (36.6  C) Temp src: Oral BP: 135/69 Pulse: 70 Heart Rate: 72 Resp: 16 SpO2: 93 % O2 Device: None (Room air)    Weight: 173 lbs 14.4 oz     Constitutional: AAOx3, comfortable  Respiratory: Breath sounds CTA. No increased work of breathing.  Cardiovascular: RRR, no rub or  murmur. 2+ leg edema  GI: Soft, non-tender. Distended but soft and non tender.    Skin: Warm, dry.      Medications       fluticasone-vilanterol  1 puff Inhalation Daily     folic acid  1 mg Oral Daily     furosemide  40 mg Oral Daily     hydrOXYzine  100 mg Oral TID     mirtazapine  30 mg Oral At Bedtime     multivitamin w/minerals  1 tablet Oral Daily     pantoprazole  40 mg Oral BID AC     senna-docusate  1 tablet Oral BID    Or     senna-docusate  2 tablet Oral BID     tamsulosin  0.4 mg Oral QPM     traZODone  100 mg Oral At Bedtime     vortioxetine  10 mg Oral Daily       Data   Recent Labs   Lab 06/26/20  0823 06/25/20  0555 06/24/20  0630 06/23/20  0705   WBC  --   --  4.2 3.8*   HGB  --   --  9.0* 8.8*   MCV  --   --  92 92   PLT  --   --  167 162    132* 136 136   POTASSIUM 3.9 3.8 4.0 4.1   CHLORIDE 102 102 104 106   CO2 25 23 25 25   BUN 32* 29 30 32*   CR 2.08* 1.88* 1.83* 1.94*   ANIONGAP 7 7 7 5   KEV 8.7 8.8 8.9 9.0   * 82 88 86   ALBUMIN  --   --   --  3.2*       Imaging:  Recent Results (from the past 24 hour(s))   US Paracentesis    Narrative    US PARACENTESIS 6/25/2020 1:52 PM    CLINICAL HISTORY: High volume paracentesis with or without diagnostic  fluid analysis with labs to be drawn if ordered    PROCEDURE: Informed consent obtained. Time out performed. The abdomen  was prepped and draped in a sterile fashion. 10 mL of 1% lidocaine was  infused into local soft tissues. A 5 [place] catheter system was  introduced into the abdominal ascites under ultrasound guidance.    3.3 liters of clear fluid were removed and sent to lab if requested.    Patient tolerated procedure well.    Ultrasound imaging was obtained and placed in the patient's permanent  medical record.      Impression    IMPRESSION:  1.  Status post ultrasound-guided paracentesis.    ANIL KERN MD

## 2020-06-26 NOTE — PLAN OF CARE
DATE & TIME: 06/26/20 3505-7489             Cognitive Concerns/ Orientation : A&Ox4  BEHAVIOR & AGGRESSION TOOL COLOR: Green  CIWA SCORE:   ABNL VS/O2: VSS on RA  MOBILITY: Ind  PAIN MANAGMENT: L upper back pain relieved with movement and Ice  DIET: 2gm Na 1500 FR  BOWEL/BLADDER: Up to BR  ABNL LAB/BG: Creat 2.08 Neph aware  DRAIN/DEVICES: None  TELEMETRY RHYTHM:  SKIN: Bruised, edema to LLE  TESTS/PROCEDURES:  D/C DAY/GOALS/PLACE: Possible discharge on Monday to [place].  OTHER IMPORTANT INFO:

## 2020-06-26 NOTE — PLAN OF CARE
DATE & TIME: 06/25/20 9375-2900      Cognitive Concerns/ Orientation : A&Ox4, anxious at times PRN seroquel avail, none given this shift  BEHAVIOR & AGGRESSION TOOL COLOR: Green  CIWA SCORE: NA  ABNL VS/O2: VSS on RA  MOBILITY: Ind  PAIN MANAGMENT: Denies   DIET: 2gm Na 1500 Fluid restriction   BOWEL/BLADDER: Up to BR  ABNL LAB/BG: Creat 1.88 Na 132  DRAIN/DEVICES: NO IV ACCESS  TELEMETRY RHYTHM:  SKIN: Bruised, edema to LLE  TESTS/PROCEDURES: Right sided paracentesis done 6/25  D/C DAY/GOALS/PLACE: Pending placement- possibly [place] next Mon. [place] to call today at 0900 to talk to patient.  OTHER IMPORTANT INFO: DANIELE following.

## 2020-06-27 LAB
ANION GAP SERPL CALCULATED.3IONS-SCNC: 8 MMOL/L (ref 3–14)
BASOPHILS # BLD AUTO: 0 10E9/L (ref 0–0.2)
BASOPHILS NFR BLD AUTO: 0.8 %
BUN SERPL-MCNC: 30 MG/DL (ref 7–30)
CALCIUM SERPL-MCNC: 8.6 MG/DL (ref 8.5–10.1)
CHLORIDE SERPL-SCNC: 98 MMOL/L (ref 94–109)
CO2 SERPL-SCNC: 24 MMOL/L (ref 20–32)
CREAT SERPL-MCNC: 1.88 MG/DL (ref 0.66–1.25)
DIFFERENTIAL METHOD BLD: ABNORMAL
EOSINOPHIL # BLD AUTO: 0 10E9/L (ref 0–0.7)
EOSINOPHIL NFR BLD AUTO: 0 %
ERYTHROCYTE [DISTWIDTH] IN BLOOD BY AUTOMATED COUNT: 15.6 % (ref 10–15)
GFR SERPL CREATININE-BSD FRML MDRD: 36 ML/MIN/{1.73_M2}
GLUCOSE SERPL-MCNC: 83 MG/DL (ref 70–99)
HCT VFR BLD AUTO: 26.9 % (ref 40–53)
HGB BLD-MCNC: 9.1 G/DL (ref 13.3–17.7)
IMM GRANULOCYTES # BLD: 0 10E9/L (ref 0–0.4)
IMM GRANULOCYTES NFR BLD: 0.3 %
LYMPHOCYTES # BLD AUTO: 0.5 10E9/L (ref 0.8–5.3)
LYMPHOCYTES NFR BLD AUTO: 12 %
MAGNESIUM SERPL-MCNC: 1.7 MG/DL (ref 1.6–2.3)
MCH RBC QN AUTO: 30.3 PG (ref 26.5–33)
MCHC RBC AUTO-ENTMCNC: 33.8 G/DL (ref 31.5–36.5)
MCV RBC AUTO: 90 FL (ref 78–100)
MONOCYTES # BLD AUTO: 0.4 10E9/L (ref 0–1.3)
MONOCYTES NFR BLD AUTO: 9.5 %
NEUTROPHILS # BLD AUTO: 3.1 10E9/L (ref 1.6–8.3)
NEUTROPHILS NFR BLD AUTO: 77.4 %
NRBC # BLD AUTO: 0 10*3/UL
NRBC BLD AUTO-RTO: 0 /100
PLATELET # BLD AUTO: 190 10E9/L (ref 150–450)
POTASSIUM SERPL-SCNC: 3.3 MMOL/L (ref 3.4–5.3)
RBC # BLD AUTO: 3 10E12/L (ref 4.4–5.9)
SODIUM SERPL-SCNC: 130 MMOL/L (ref 133–144)
WBC # BLD AUTO: 4 10E9/L (ref 4–11)

## 2020-06-27 PROCEDURE — 83735 ASSAY OF MAGNESIUM: CPT | Performed by: INTERNAL MEDICINE

## 2020-06-27 PROCEDURE — 99232 SBSQ HOSP IP/OBS MODERATE 35: CPT | Performed by: HOSPITALIST

## 2020-06-27 PROCEDURE — 25000132 ZZH RX MED GY IP 250 OP 250 PS 637: Mod: GY | Performed by: HOSPITALIST

## 2020-06-27 PROCEDURE — 36415 COLL VENOUS BLD VENIPUNCTURE: CPT | Performed by: INTERNAL MEDICINE

## 2020-06-27 PROCEDURE — 80048 BASIC METABOLIC PNL TOTAL CA: CPT | Performed by: INTERNAL MEDICINE

## 2020-06-27 PROCEDURE — 25000128 H RX IP 250 OP 636: Performed by: HOSPITALIST

## 2020-06-27 PROCEDURE — 12000000 ZZH R&B MED SURG/OB

## 2020-06-27 PROCEDURE — 85025 COMPLETE CBC W/AUTO DIFF WBC: CPT | Performed by: INTERNAL MEDICINE

## 2020-06-27 PROCEDURE — 25000132 ZZH RX MED GY IP 250 OP 250 PS 637: Mod: GY | Performed by: PSYCHIATRY & NEUROLOGY

## 2020-06-27 RX ORDER — POTASSIUM CHLORIDE 1.5 G/1.58G
20 POWDER, FOR SOLUTION ORAL 2 TIMES DAILY WITH MEALS
Status: DISCONTINUED | OUTPATIENT
Start: 2020-06-27 | End: 2020-06-27

## 2020-06-27 RX ORDER — METHOCARBAMOL 500 MG/1
500 TABLET, FILM COATED ORAL 3 TIMES DAILY PRN
Status: DISCONTINUED | OUTPATIENT
Start: 2020-06-27 | End: 2020-07-02 | Stop reason: HOSPADM

## 2020-06-27 RX ORDER — POTASSIUM CHLORIDE 1.5 G/1.58G
20 POWDER, FOR SOLUTION ORAL 2 TIMES DAILY WITH MEALS
Status: DISCONTINUED | OUTPATIENT
Start: 2020-06-27 | End: 2020-06-29

## 2020-06-27 RX ADMIN — HYDROXYZINE HYDROCHLORIDE 100 MG: 50 TABLET, FILM COATED ORAL at 16:01

## 2020-06-27 RX ADMIN — VORTIOXETINE 10 MG: 10 TABLET, FILM COATED ORAL at 09:18

## 2020-06-27 RX ADMIN — HYDROXYZINE HYDROCHLORIDE 100 MG: 50 TABLET, FILM COATED ORAL at 21:41

## 2020-06-27 RX ADMIN — MIRTAZAPINE 30 MG: 15 TABLET, FILM COATED ORAL at 21:41

## 2020-06-27 RX ADMIN — FLUTICASONE FUROATE AND VILANTEROL TRIFENATATE 1 PUFF: 200; 25 POWDER RESPIRATORY (INHALATION) at 09:23

## 2020-06-27 RX ADMIN — HYDROXYZINE HYDROCHLORIDE 100 MG: 50 TABLET, FILM COATED ORAL at 09:18

## 2020-06-27 RX ADMIN — QUETIAPINE FUMARATE 50 MG: 50 TABLET ORAL at 17:46

## 2020-06-27 RX ADMIN — FOLIC ACID 1 MG: 1 TABLET ORAL at 09:18

## 2020-06-27 RX ADMIN — METHOCARBAMOL 500 MG: 500 TABLET, FILM COATED ORAL at 14:33

## 2020-06-27 RX ADMIN — QUETIAPINE FUMARATE 50 MG: 50 TABLET ORAL at 17:54

## 2020-06-27 RX ADMIN — QUETIAPINE FUMARATE 100 MG: 50 TABLET ORAL at 00:24

## 2020-06-27 RX ADMIN — POTASSIUM CHLORIDE 20 MEQ: 1.5 POWDER, FOR SOLUTION ORAL at 17:46

## 2020-06-27 RX ADMIN — TRAZODONE HYDROCHLORIDE 100 MG: 50 TABLET ORAL at 21:41

## 2020-06-27 RX ADMIN — PANTOPRAZOLE SODIUM 40 MG: 40 TABLET, DELAYED RELEASE ORAL at 07:39

## 2020-06-27 RX ADMIN — ONDANSETRON 4 MG: 4 TABLET, ORALLY DISINTEGRATING ORAL at 06:16

## 2020-06-27 RX ADMIN — FUROSEMIDE 40 MG: 40 TABLET ORAL at 09:18

## 2020-06-27 RX ADMIN — TAMSULOSIN HYDROCHLORIDE 0.4 MG: 0.4 CAPSULE ORAL at 21:40

## 2020-06-27 RX ADMIN — PANTOPRAZOLE SODIUM 40 MG: 40 TABLET, DELAYED RELEASE ORAL at 16:01

## 2020-06-27 RX ADMIN — DOCUSATE SODIUM AND SENNOSIDES 2 TABLET: 8.6; 5 TABLET, FILM COATED ORAL at 09:18

## 2020-06-27 RX ADMIN — DOCUSATE SODIUM AND SENNOSIDES 2 TABLET: 8.6; 5 TABLET, FILM COATED ORAL at 21:41

## 2020-06-27 RX ADMIN — POTASSIUM CHLORIDE 20 MEQ: 1.5 POWDER, FOR SOLUTION ORAL at 09:17

## 2020-06-27 RX ADMIN — MULTIPLE VITAMINS W/ MINERALS TAB 1 TABLET: TAB at 09:18

## 2020-06-27 RX ADMIN — QUETIAPINE FUMARATE 100 MG: 50 TABLET ORAL at 10:57

## 2020-06-27 ASSESSMENT — ACTIVITIES OF DAILY LIVING (ADL)
ADLS_ACUITY_SCORE: 11

## 2020-06-27 ASSESSMENT — MIFFLIN-ST. JEOR: SCORE: 1537.41

## 2020-06-27 NOTE — PLAN OF CARE
calm and cooperative  BEHAVIOR & AGGRESSION TOOL COLOR: Green  CIWA SCORE: N/A  ABNL VS/O2:Armando with elevated systolic  MOBILITY: Ind  PAIN MANAGMENT: L lower back pain relieved with movement and ice application  DIET: 2gm Na + diet and 1500 FR - met limit  BOWEL/BLADDER: Up to BR, continent B&B  ABNL LAB/BG: Creat 2.08 Neph aware  DRAIN/DEVICES: None  TELEMETRY RHYTHM:  SKIN: Bruised, edema to LLE  TESTS/PROCEDURES:  D/C DAY/GOALS/PLACE: Possible discharge on Monday to [place].  OTHER IMPORTANT INFO: PRN Seroquel given per pt's request, also received ODT zofran for nausea.

## 2020-06-27 NOTE — PROVIDER NOTIFICATION
MD Notification    Notified Person: MD Koenig    Notified Person Name:    Notification Date/Time: 6/27/20 0725    Notification Interaction: text page     Purpose of Notification: Good morning Dr, patient's K with AM lab draw was 3.3 and has no protocol on his MAR. Please advise, thanks!    Orders Received: Order entered for scheduled potassium replacements    Update:Thank you for the K order, when would you like K rechecked?     BMP will be rechecked tomorrow AM.     Comments:

## 2020-06-27 NOTE — PLAN OF CARE
DATE & TIME: 06/26/20 7962-5300            Cognitive Concerns/ Orientation : A&Ox4, calm and cooperative  BEHAVIOR & AGGRESSION TOOL COLOR: Green  CIWA SCORE: N/A  ABNL VS/O2: VSS on RA  MOBILITY: Ind  PAIN MANAGMENT: L upper back pain relieved with movement and Ice  DIET: 2gm Na 1500 FR - met limit  BOWEL/BLADDER: Up to BR  ABNL LAB/BG: Creat 2.08 Neph aware  DRAIN/DEVICES: None  TELEMETRY RHYTHM:  SKIN: Bruised, edema to LLE  TESTS/PROCEDURES:  D/C DAY/GOALS/PLACE: Possible discharge on Monday to [place].  OTHER IMPORTANT INFO:

## 2020-06-27 NOTE — PROGRESS NOTES
[facility]     Renal Progress Note       SHORTHAND KEY FOR MY NOTES:  c = with, s = without, p = after, a = before, x = except, asx = asymptomatic, tx = transplant or treatment, sx = symptoms or symptomatic, cx = canceled or culture, rxn = reaction, yday = yesterday, nl = normal, abx = antibiotics, fxn = function, dx = diagnosis, dz = disease, m/h = melena/hematochezia, c/d/l/ha = cramping/dizziness/lightheadedness/headache, d/c = discharge or diarrhea/constipation, f/c/n/v = fevers/chills/nausea/vomiting, cp/sob = chest pain/shortness of breath, tbv = total body volume, rxn = reaction, tdc = tunneled dialysis catheter, pta = prior to admission, hd = hemodialysis, pd = peritoneal dialysis, hhd = home hemodialysis         Assessment/Plan:     1.  NGUYEN/CKD. Pt's cr is stable ~1.8 and he is making a good amt of urine. He remains TBV up from third-spacing and is on diuretics.  No uremic sx.      A.  Follow labs, uo, sx.    2.  Cirrhosis c ascites.  Pt is doing ok clinically.  Tolerating the diuretics.  A.  Continue furos 40 mg every day.    3.  Anemia. Hb is stable.  No bleeding noted.    A.  Follow hb, clinically.      4.  FEN.  K is low today - starting oral KCl.  Na is variable and is lower today.  Asx.  A.  Check mg.  B.  Follow labs.  C.  Agree c KCl 40 every day for now.        Interval History:     Pt is feeling fine today.  No cp/sob/abd pain.  Urinating well c the diuretics.  No f/c/n/v.  Eating ok.          Medications and Allergies:       fluticasone-vilanterol  1 puff Inhalation Daily     folic acid  1 mg Oral Daily     furosemide  40 mg Oral Daily     hydrOXYzine  100 mg Oral TID     mirtazapine  30 mg Oral At Bedtime     multivitamin w/minerals  1 tablet Oral Daily     pantoprazole  40 mg Oral BID AC     potassium chloride  20 mEq Oral BID w/meals     senna-docusate  1 tablet Oral BID    Or     senna-docusate  2 tablet Oral BID     tamsulosin  0.4 mg Oral QPM     traZODone  100 mg Oral At  "Bedtime     vortioxetine  10 mg Oral Daily     Allergies   Allergen Reactions     Amlodipine Swelling     Lisinopril      Other reaction(s): Angioedema  Mouth and tongue swelling   Mouth and tongue swelling             Physical Exam:     Vitals were reviewed    Heart Rate: 58, Blood pressure 125/67, pulse 67, temperature 98.9  F (37.2  C), temperature source Oral, resp. rate 18, height 1.702 m (5' 7\"), weight 79.9 kg (176 lb 1.6 oz), SpO2 96 %.  Wt Readings from Last 3 Encounters:   06/27/20 79.9 kg (176 lb 1.6 oz)   05/26/20 86.2 kg (190 lb)   05/03/20 84.1 kg (185 lb 6.4 oz)     Intake/Output Summary (Last 24 hours) at 6/27/2020 1141  Last data filed at 6/27/2020 1056  Gross per 24 hour   Intake 1740 ml   Output 2700 ml   Net -960 ml     GENERAL APPEARANCE: pleasant, NAD, alert, sitting up in chair  HEENT:  eyes/ears/nose/neck grossly nl  RESP: lungs cta b c good efforts, no crackles  CV: RRR, nl S1/S2  ABDOMEN: abd protuberant, soft  EXTREMITIES/SKIN: 1+ ble edema - L > R (unchanged)         Data:     CBC RESULTS:     Recent Labs   Lab 06/27/20  0539 06/24/20  0630 06/23/20  0705   WBC 4.0 4.2 3.8*   RBC 3.00* 2.94* 2.88*   HGB 9.1* 9.0* 8.8*   HCT 26.9* 26.9* 26.4*    167 162     Basic Metabolic Panel:  Recent Labs   Lab 06/27/20  0539 06/26/20  0823 06/25/20  0555 06/24/20  0630 06/23/20  0705 06/22/20  0616   * 134 132* 136 136 137   POTASSIUM 3.3* 3.9 3.8 4.0 4.1 4.3   CHLORIDE 98 102 102 104 106 108   CO2 24 25 23 25 25 24   BUN 30 32* 29 30 32* 37*   CR 1.88* 2.08* 1.88* 1.83* 1.94* 2.12*   GLC 83 132* 82 88 86 91   KEV 8.6 8.7 8.8 8.9 9.0 8.9     INRNo lab results found in last 7 days.   Attestation:   I have reviewed today's relevant vital signs, notes, medications, labs and imaging.    Christian Harvey MD  [facility] - Nephrology  [phone]  "

## 2020-06-27 NOTE — PROGRESS NOTES
[facility]  Hospitalist Progress Note        Jace Koenig MD   06/27/2020        Interval History:      - no acute issues overnight; diuretics resumed 6/26         Assessment and Plan:        Jim Richard is a 65 year old male with PMH significant for alcohol abuse, alcoholic cirrhosis with ascites, JANIS, depression and anxiety, history of G.I. bleed with esophageal varices who presented to ER with alcohol intoxication and suicidal ideation. While in ED he also reported some dark stools and was admitted for further evaluation of G.I. bleed     Left lower extremity swelling.  Hx PAD bypass same leg. No Swelling, redness, pain    - U/s ruled out dvt.     Alcoholic cirrhosis with ascites.  Alcohol abuse/dependence. Blood alcohol level on admission 0.32. CIWA monitored, no withdrawal. Clonidine discontinued.  - PTA Lasix and Aldactone was held on admission given worsening renal function. Lasix and Aldactone resumed 6/16 at low dose followed by slight increase in creatinine. Subsequently Lasix, then aldactone held.  - S/p paracentesis 6/10 w/ 4L off and 6/15 w/ 4L off  And 6/25 with 3.3 L off    - Lasix resumed 6/26. Continue to hold spironolactone     Acute on CKD, stage 3.  Hypokalemia  - baseline creatinine around 1.3-1.5. Creatinine 1.7 on adm. Likely prerenal secondary to alcohol abuse and poor PO intake; held off on PTA Lasix, Aldactone and started on cautious IV hydration but renal function worsened; IVF d/abhijeet on 6/11 evening. Renal US (6/12) unrevealing except for some evidence of chronic urinary bladder outlet obstruction. Nephrology consulted. Received IV albumin.   - Cr stable 1.8-1.9, resumed lasix 6/26. Monitor BMP   - nephrology following;  diuretics adjustment per nephrology, might need to scale back if renal function continues to worsen, monitor BMP  - K 3.3 on 6/26, likely sec to diuresis, will start KCL 20 meq po bid     Medication noncompliance. Had stopped taking all his  "medications recently PTA.     Anxiety and depression.  Evaluated by psych and restarted on PTA Remeron, Seroquel, trazodone, Trintellix which he has been non-compliant with; were resumed per psychiatry recs  - Patient continued to feel his anxiety was not controlled.  Psychiatry was reconsulted, Seroquel and Atarax increased 6/18.     Suspected GI bleed with history of esophagitis and esophageal varices.  Chronic anemia and thrombocytopenia.  Hemodynamically stable. No further melena. Baseline hemoglobin 8-9; stabilized around 8-9. [facility] consulted. EGD 6/11 noted again with grade 1 esophageal varices, moderate portal gastropathy. Received SBP prophylactic ceftriaxone, discontinued 6/13. Does have s/o iron deficiency; got IV iron per nephrology.  - continue Protonix PO bid     BPH. Flomax     Chronic non-O2 dpnt COPD. PTA inhalers     Breast tenderness. gynecomastia related to his cirrhosis.      Lt hip pain  Back pain  Appears to have greater trochanteric bursitis. Avoid nsaids given liver disease. Cold pack, avoid pressure to the area.   - will prn robaxin for back pain     Diet: Fluid restriction 1500 ML FLUID  2 Gram Sodium Diet     DVT Prophylaxis: PCDs  Code Status: Full Code     Expected discharge: To [facility] when accepted, most likely 6/29. Medically stable, he is going to need paracentesis as outpatient as well.  SW following                     Physical Exam:      Blood pressure 125/67, pulse 67, temperature 98.9  F (37.2  C), temperature source Oral, resp. rate 18, height 1.702 m (5' 7\"), weight 79.9 kg (176 lb 1.6 oz), SpO2 96 %.  Vitals:    06/25/20 0701 06/26/20 0606 06/27/20 0529   Weight: 83.8 kg (184 lb 11.2 oz) 78.9 kg (173 lb 14.4 oz) 79.9 kg (176 lb 1.6 oz)     Vital Signs with Ranges  Temp:  [98.3  F (36.8  C)-98.9  F (37.2  C)] 98.9  F (37.2  C)  Pulse:  [56-67] 67  Heart Rate:  [58] 58  Resp:  [16-18] 18  BP: (123-144)/(60-72) 125/67  SpO2:  [94 %-98 %] 96 %  I/O's Last 24 hours  I/O " last 3 completed shifts:  In: 1500 [P.O.:1500]  Out: 2700 [Urine:2700]    Constitutional: Alert, awake and orienetd X 3; lying comfortably in bed in no apparent distress   HEENT: Pupils equal and reactive to light and accomodation, EOMI intact; neck supple   Oral cavity: Moist oral mucosa   Cardiovascular: Normal s1 s2, regular rate and rhythm, no murmur   Lungs: B/l clear to auscultation, no wheezes or crepitations   Abdomen: Soft, distended, ascites +, nd, no guarding, rigidity or rebound; BS +   LE : Mild edema +   Musculoskeletal: Power 5/5 in all extremities    Neuro: No focal neurological deficits noted, CN II to XII grossly intact   Psychiatry: normal mood and affect  Skin: No obvious skin rashes or ulcers                  Medications:          fluticasone-vilanterol  1 puff Inhalation Daily     folic acid  1 mg Oral Daily     furosemide  40 mg Oral Daily     hydrOXYzine  100 mg Oral TID     mirtazapine  30 mg Oral At Bedtime     multivitamin w/minerals  1 tablet Oral Daily     pantoprazole  40 mg Oral BID AC     potassium chloride  20 mEq Oral BID w/meals     senna-docusate  1 tablet Oral BID    Or     senna-docusate  2 tablet Oral BID     tamsulosin  0.4 mg Oral QPM     traZODone  100 mg Oral At Bedtime     vortioxetine  10 mg Oral Daily     PRN Meds: bisacodyl, artificial tears ophthalmic solution, lidocaine 4%, lidocaine (buffered or not buffered), naloxone, ondansetron **OR** ondansetron, polyethylene glycol, prochlorperazine **OR** prochlorperazine **OR** prochlorperazine, QUEtiapine, QUEtiapine         Data:      All new lab and imaging data was reviewed.   Recent Labs   Lab Test 06/27/20  0539 06/24/20  0630 06/23/20  0705  06/10/20  0206 05/26/20  1215 05/14/20  2322   WBC 4.0 4.2 3.8*  --  5.9 2.8* 7.0   HGB 9.1* 9.0* 8.8*   < > 9.6* 8.7* 9.5*   MCV 90 92 92  --  89 92 88    167 162   < > 161 78* 205   INR  --   --   --   --  1.22* 1.30* 1.23*    < > = values in this interval not displayed.       Recent Labs   Lab Test 06/27/20  0539 06/26/20  0823 06/25/20  0555   * 134 132*   POTASSIUM 3.3* 3.9 3.8   CHLORIDE 98 102 102   CO2 24 25 23   BUN 30 32* 29   CR 1.88* 2.08* 1.88*   ANIONGAP 8 7 7   KEV 8.6 8.7 8.8   GLC 83 132* 82     Recent Labs   Lab Test 06/10/20  0206 04/26/18  1940   TROPI 0.023 <0.015

## 2020-06-27 NOTE — PLAN OF CARE
A&Ox4. CMS intact. Bowel sounds audible, passing flatus, tolerating 2 Na diet with fluid restriction of 1500ml/day. VSS. Potassium replaced, recheck with morning labs. Up with independently. C/o low back pain, decreased with ice packs, ambulating, and scheduled atarax, pending reassessment of PRN robaxin for reliving pain, on coming RN will reassess. Pt scoring green on the Aggression Stop Light Tool. Plan possible discharge Monday to [place].

## 2020-06-28 LAB
ANION GAP SERPL CALCULATED.3IONS-SCNC: 8 MMOL/L (ref 3–14)
BUN SERPL-MCNC: 33 MG/DL (ref 7–30)
CALCIUM SERPL-MCNC: 8.2 MG/DL (ref 8.5–10.1)
CHLORIDE SERPL-SCNC: 100 MMOL/L (ref 94–109)
CO2 SERPL-SCNC: 24 MMOL/L (ref 20–32)
CREAT SERPL-MCNC: 1.83 MG/DL (ref 0.66–1.25)
GFR SERPL CREATININE-BSD FRML MDRD: 38 ML/MIN/{1.73_M2}
GLUCOSE SERPL-MCNC: 92 MG/DL (ref 70–99)
POTASSIUM SERPL-SCNC: 3.5 MMOL/L (ref 3.4–5.3)
SODIUM SERPL-SCNC: 132 MMOL/L (ref 133–144)

## 2020-06-28 PROCEDURE — 25000128 H RX IP 250 OP 636: Performed by: HOSPITALIST

## 2020-06-28 PROCEDURE — 80048 BASIC METABOLIC PNL TOTAL CA: CPT | Performed by: INTERNAL MEDICINE

## 2020-06-28 PROCEDURE — 25000132 ZZH RX MED GY IP 250 OP 250 PS 637: Mod: GY | Performed by: HOSPITALIST

## 2020-06-28 PROCEDURE — 36415 COLL VENOUS BLD VENIPUNCTURE: CPT | Performed by: INTERNAL MEDICINE

## 2020-06-28 PROCEDURE — 12000000 ZZH R&B MED SURG/OB

## 2020-06-28 PROCEDURE — 25000132 ZZH RX MED GY IP 250 OP 250 PS 637: Mod: GY | Performed by: PSYCHIATRY & NEUROLOGY

## 2020-06-28 PROCEDURE — 99232 SBSQ HOSP IP/OBS MODERATE 35: CPT | Performed by: HOSPITALIST

## 2020-06-28 RX ORDER — ALBUMIN (HUMAN) 12.5 G/50ML
12.5-5 SOLUTION INTRAVENOUS ONCE
Status: CANCELLED | OUTPATIENT
Start: 2020-06-28 | End: 2020-06-28

## 2020-06-28 RX ADMIN — DOCUSATE SODIUM AND SENNOSIDES 2 TABLET: 8.6; 5 TABLET, FILM COATED ORAL at 08:51

## 2020-06-28 RX ADMIN — HYDROXYZINE HYDROCHLORIDE 100 MG: 50 TABLET, FILM COATED ORAL at 08:51

## 2020-06-28 RX ADMIN — PANTOPRAZOLE SODIUM 40 MG: 40 TABLET, DELAYED RELEASE ORAL at 16:19

## 2020-06-28 RX ADMIN — ONDANSETRON 4 MG: 4 TABLET, ORALLY DISINTEGRATING ORAL at 10:41

## 2020-06-28 RX ADMIN — FLUTICASONE FUROATE AND VILANTEROL TRIFENATATE 1 PUFF: 200; 25 POWDER RESPIRATORY (INHALATION) at 08:50

## 2020-06-28 RX ADMIN — MULTIPLE VITAMINS W/ MINERALS TAB 1 TABLET: TAB at 08:51

## 2020-06-28 RX ADMIN — FUROSEMIDE 40 MG: 40 TABLET ORAL at 08:52

## 2020-06-28 RX ADMIN — MIRTAZAPINE 30 MG: 15 TABLET, FILM COATED ORAL at 21:08

## 2020-06-28 RX ADMIN — VORTIOXETINE 10 MG: 10 TABLET, FILM COATED ORAL at 08:51

## 2020-06-28 RX ADMIN — QUETIAPINE FUMARATE 100 MG: 50 TABLET ORAL at 00:54

## 2020-06-28 RX ADMIN — METHOCARBAMOL 500 MG: 500 TABLET, FILM COATED ORAL at 17:34

## 2020-06-28 RX ADMIN — FOLIC ACID 1 MG: 1 TABLET ORAL at 08:51

## 2020-06-28 RX ADMIN — METHOCARBAMOL 500 MG: 500 TABLET, FILM COATED ORAL at 00:54

## 2020-06-28 RX ADMIN — HYDROXYZINE HYDROCHLORIDE 100 MG: 50 TABLET, FILM COATED ORAL at 16:19

## 2020-06-28 RX ADMIN — DOCUSATE SODIUM AND SENNOSIDES 2 TABLET: 8.6; 5 TABLET, FILM COATED ORAL at 21:08

## 2020-06-28 RX ADMIN — TAMSULOSIN HYDROCHLORIDE 0.4 MG: 0.4 CAPSULE ORAL at 21:08

## 2020-06-28 RX ADMIN — POTASSIUM CHLORIDE 20 MEQ: 1.5 POWDER, FOR SOLUTION ORAL at 08:51

## 2020-06-28 RX ADMIN — QUETIAPINE FUMARATE 100 MG: 50 TABLET ORAL at 16:19

## 2020-06-28 RX ADMIN — QUETIAPINE FUMARATE 100 MG: 50 TABLET ORAL at 08:51

## 2020-06-28 RX ADMIN — METHOCARBAMOL 500 MG: 500 TABLET, FILM COATED ORAL at 08:52

## 2020-06-28 RX ADMIN — HYDROXYZINE HYDROCHLORIDE 100 MG: 50 TABLET, FILM COATED ORAL at 21:08

## 2020-06-28 RX ADMIN — POTASSIUM CHLORIDE 20 MEQ: 1.5 POWDER, FOR SOLUTION ORAL at 17:34

## 2020-06-28 RX ADMIN — TRAZODONE HYDROCHLORIDE 100 MG: 50 TABLET ORAL at 21:08

## 2020-06-28 RX ADMIN — PANTOPRAZOLE SODIUM 40 MG: 40 TABLET, DELAYED RELEASE ORAL at 06:37

## 2020-06-28 ASSESSMENT — MIFFLIN-ST. JEOR: SCORE: 1533.79

## 2020-06-28 ASSESSMENT — ACTIVITIES OF DAILY LIVING (ADL)
ADLS_ACUITY_SCORE: 11
ADLS_ACUITY_SCORE: 12
ADLS_ACUITY_SCORE: 12
ADLS_ACUITY_SCORE: 11

## 2020-06-28 NOTE — PLAN OF CARE
DATE & TIME: 06/28/20 @0630  Cognitive Concerns/ Orientation : A&Ox4, calm and cooperative  BEHAVIOR & AGGRESSION TOOL COLOR: Green  CIWA SCORE: N/A  ABNL VS/O2:VSS on RA, ex mandi at times  MOBILITY: Independent, ambulates in hallways often  PAIN MANAGMENT: L lower back pain controlled with PRN Robaxin and  ice application  DIET: 2gm Na + diet and 1500 FR - restriction met  BOWEL/BLADDER: Up to BR, continent B&B  ABNL LAB/BG: Cr 1.88, K+ 3.3 replaced yesterday, will recheck this AM. Now on scheduled K+. Hgb 9.1  DRAIN/DEVICES: None  TELEMETRY RHYTHM: N/A  SKIN: Bruised, edema to LLE - ices frequently   TESTS/PROCEDURES: N/A  D/C DAY/GOALS/PLACE: Possible discharge on Monday to [place].  OTHER IMPORTANT INFO: PRN Seroquel given per pt's request q6h.

## 2020-06-28 NOTE — PLAN OF CARE
DATE & TIME: 06/27/20 5521-6252   Cognitive Concerns/ Orientation : A&Ox4, calm and cooperative  BEHAVIOR & AGGRESSION TOOL COLOR: Green  CIWA SCORE: N/A  ABNL VS/O2:VSS on RA, ex mandi at times  MOBILITY: Independent, ambulates in hallways often  PAIN MANAGMENT: L lower back pain relieved with movement and ice application  DIET: 2gm Na + diet and 1500 FR - restriction met  BOWEL/BLADDER: Up to BR, continent B&B  ABNL LAB/BG: Cr 1.88, K+ 3.3 replaced today, will recheck in AM. Now on scheduled K+. Hgb 9.1  DRAIN/DEVICES: None  TELEMETRY RHYTHM: N/A  SKIN: Bruised, edema to LLE - ices frequently   TESTS/PROCEDURES: N/A  D/C DAY/GOALS/PLACE: Possible discharge on Monday to [place].  OTHER IMPORTANT INFO: PRN Seroquel given per pt's request q6h.

## 2020-06-28 NOTE — PROGRESS NOTES
[facility]  Hospitalist Progress Note        Jace Koenig MD   06/28/2020        Interval History:      - reports abdomen distension getting worse and now more tense         Assessment and Plan:        Jim Richard is a 65 year old male with PMH significant for alcohol abuse, alcoholic cirrhosis with ascites, JANIS, depression and anxiety, history of G.I. bleed with esophageal varices who presented to ER with alcohol intoxication and suicidal ideation. While in ED he also reported some dark stools and was admitted for further evaluation of G.I. bleed     Alcoholic cirrhosis with ascites, needing repeated paracentesis.  Alcohol abuse/dependence.   - Blood alcohol level on admission 0.32. CIWA monitored, no withdrawal. Clonidine discontinued.  - PTA Lasix and Aldactone initially held on admission given worsening renal function  - Lasix resumed 6/26. Continue to hold spironolactone, defer to nephrology if this can restarted   - S/p paracentesis 6/10 w/ 4L off and 6/15 w/ 4L off  And 6/25 with 3.3 L off   - now abdomen distension getting worse and more tense, will order repeat paracentesis for 6/28 or 6/29    Acute on CKD, stage 3.  Hypokalemia  - baseline creatinine around 1.3-1.5. Creatinine 1.7 on adm. Likely prerenal secondary to alcohol abuse and poor PO intake  - diuretics adjustment as noted above, aldactone still on hold  - Renal US (6/12) unrevealing except for some evidence of chronic urinary bladder outlet obstruction. Nephrology consulted. Received several doses of IV albumin.   - Cr stable 1.8-1.9, improving from peak 2.12, resumed lasix 6/26. Monitor BMP   - nephrology following;  diuretics adjustment per nephrology  - K 3.3 on 6/26---corrected, likely sec to diuresis, continue KCL 20 meq po bid since now resumed on Lasix     Medication noncompliance. Had stopped taking all his medications recently PTA.     Anxiety and depression.  Evaluated by psych and restarted on PTA  "Remeron, Seroquel, trazodone, Trintellix which he has been non-compliant with; were resumed per psychiatry recs  - Patient continued to feel his anxiety was not controlled.  Psychiatry was reconsulted, Seroquel and Atarax increased 6/18.     Suspected GI bleed with history of esophagitis and esophageal varices.  Chronic anemia and thrombocytopenia.  Hemodynamically stable. No further melena. Baseline hemoglobin 8-9; stabilized around 8-9. [facility] consulted. EGD 6/11 noted again with grade 1 esophageal varices, moderate portal gastropathy. Received SBP prophylactic ceftriaxone, discontinued 6/13. Does have s/o iron deficiency; got IV iron per nephrology.  - continue Protonix PO bid    Left lower extremity swelling.  Hx PAD bypass same leg. No Swelling, redness, pain    - U/s ruled out dvt.     BPH. Flomax     Chronic non-O2 dpnt COPD. PTA inhalers     Breast tenderness. gynecomastia related to his cirrhosis.      Lt hip pain  Back pain  Appears to have greater trochanteric bursitis. Avoid nsaids given liver disease. Cold pack, avoid pressure to the area.   - will prn robaxin for back pain     Diet: Fluid restriction 1500 ML FLUID  2 Gram Sodium Diet     DVT Prophylaxis: PCDs  Code Status: Full Code     Expected discharge: To [facility] when accepted, most likely 6/29 after paracentesis; he is going to need paracentesis as outpatient as well.  SW following                     Physical Exam:      Blood pressure 124/66, pulse 59, temperature 98.6  F (37  C), temperature source Oral, resp. rate 16, height 1.702 m (5' 7\"), weight 79.5 kg (175 lb 4.8 oz), SpO2 93 %.  Vitals:    06/26/20 0606 06/27/20 0529 06/28/20 0517   Weight: 78.9 kg (173 lb 14.4 oz) 79.9 kg (176 lb 1.6 oz) 79.5 kg (175 lb 4.8 oz)     Vital Signs with Ranges  Temp:  [97.9  F (36.6  C)-98.7  F (37.1  C)] 98.6  F (37  C)  Pulse:  [59] 59  Heart Rate:  [70-71] 70  Resp:  [16-18] 16  BP: (124-137)/(63-66) 124/66  SpO2:  [93 %-97 %] 93 %  I/O's Last 24 " hours  I/O last 3 completed shifts:  In: 880 [P.O.:880]  Out: 2920 [Urine:2920]    Constitutional: Alert, awake and orienetd X 3; lying comfortably in bed in no apparent distress   HEENT: Pupils equal and reactive to light and accomodation, EOMI intact; neck supple   Oral cavity: Moist oral mucosa   Cardiovascular: Normal s1 s2, regular rate and rhythm, no murmur   Lungs: B/l clear to auscultation, no wheezes or crepitations   Abdomen: Soft, distended, tense ascites +, nd, no guarding, rigidity or rebound; BS +   LE : Mild edema +   Musculoskeletal: Power 5/5 in all extremities    Neuro: No focal neurological deficits noted, CN II to XII grossly intact   Psychiatry: normal mood and affect  Skin: No obvious skin rashes or ulcers                  Medications:          fluticasone-vilanterol  1 puff Inhalation Daily     folic acid  1 mg Oral Daily     furosemide  40 mg Oral Daily     hydrOXYzine  100 mg Oral TID     mirtazapine  30 mg Oral At Bedtime     multivitamin w/minerals  1 tablet Oral Daily     pantoprazole  40 mg Oral BID AC     potassium chloride  20 mEq Oral BID w/meals     senna-docusate  1 tablet Oral BID    Or     senna-docusate  2 tablet Oral BID     tamsulosin  0.4 mg Oral QPM     traZODone  100 mg Oral At Bedtime     vortioxetine  10 mg Oral Daily     PRN Meds: bisacodyl, artificial tears ophthalmic solution, lidocaine 4%, lidocaine (buffered or not buffered), methocarbamol, naloxone, ondansetron **OR** ondansetron, polyethylene glycol, prochlorperazine **OR** prochlorperazine **OR** prochlorperazine, QUEtiapine, QUEtiapine         Data:      All new lab and imaging data was reviewed.   Recent Labs   Lab Test 06/27/20  0539 06/24/20  0630 06/23/20  0705  06/10/20  0206 05/26/20  1215 05/14/20  2322   WBC 4.0 4.2 3.8*  --  5.9 2.8* 7.0   HGB 9.1* 9.0* 8.8*   < > 9.6* 8.7* 9.5*   MCV 90 92 92  --  89 92 88    167 162   < > 161 78* 205   INR  --   --   --   --  1.22* 1.30* 1.23*    < > = values in  this interval not displayed.      Recent Labs   Lab Test 06/28/20  0607 06/27/20  0539 06/26/20  0823   * 130* 134   POTASSIUM 3.5 3.3* 3.9   CHLORIDE 100 98 102   CO2 24 24 25   BUN 33* 30 32*   CR 1.83* 1.88* 2.08*   ANIONGAP 8 8 7   KEV 8.2* 8.6 8.7   GLC 92 83 132*     Recent Labs   Lab Test 06/10/20  0206 04/26/18  1940   TROPI 0.023 <0.015

## 2020-06-28 NOTE — PROGRESS NOTES
"[facility]     Renal Progress Note       SHORTHAND KEY FOR MY NOTES:  c = with, s = without, p = after, a = before, x = except, asx = asymptomatic, tx = transplant or treatment, sx = symptoms or symptomatic, cx = canceled or culture, rxn = reaction, yday = yesterday, nl = normal, abx = antibiotics, fxn = function, dx = diagnosis, dz = disease, m/h = melena/hematochezia, c/d/l/ha = cramping/dizziness/lightheadedness/headache, d/c = discharge or diarrhea/constipation, f/c/n/v = fevers/chills/nausea/vomiting, cp/sob = chest pain/shortness of breath, tbv = total body volume, rxn = reaction, tdc = tunneled dialysis catheter, pta = prior to admission, hd = hemodialysis, pd = peritoneal dialysis, hhd = home hemodialysis         Assessment/Plan:     1.  NGUYEN/CKD. Pt's cr is stable ~1.8 and he is making a good amt of urine. He remains TBV up from third-spacing .  Tolerating the furos s probs.   A.  Follow labs, uo, sx.  B.  He should have a BMP done in a wk if he is dc'd tmrw.    2.  Cirrhosis c ascites.  Pt is doing ok clinically.  Tolerating the diuretics.  His abd is growing due to accumulation of ascites.  A.  Continue furos 40 mg every day.  B.  Para tmrw.    3.  Anemia. Hb is stable.  No bleeding noted.    A.  Follow hb, clinically.      4.  FEN.  K is ok, but on the low side still.  Mg is ok.  He is taking oral KCl 20 bid.  Na is variable.  A.  Follow labs.        Interval History:     Pt is feeling ok today.  He had a large BM and his abd is \"now soft, but when I get up it fills up.\"  He is getting a para tmrw.  No cp/sob.  Denies any f/c/n/v.  Urinating well c furos.            Medications and Allergies:       fluticasone-vilanterol  1 puff Inhalation Daily     folic acid  1 mg Oral Daily     furosemide  40 mg Oral Daily     hydrOXYzine  100 mg Oral TID     mirtazapine  30 mg Oral At Bedtime     multivitamin w/minerals  1 tablet Oral Daily     pantoprazole  40 mg Oral BID AC     potassium chloride  " "20 mEq Oral BID w/meals     senna-docusate  1 tablet Oral BID    Or     senna-docusate  2 tablet Oral BID     tamsulosin  0.4 mg Oral QPM     traZODone  100 mg Oral At Bedtime     vortioxetine  10 mg Oral Daily     Allergies   Allergen Reactions     Amlodipine Swelling     Lisinopril      Other reaction(s): Angioedema  Mouth and tongue swelling   Mouth and tongue swelling             Physical Exam:     Vitals were reviewed    Heart Rate: 70, Blood pressure 124/66, pulse 59, temperature 98.6  F (37  C), temperature source Oral, resp. rate 16, height 1.702 m (5' 7\"), weight 79.5 kg (175 lb 4.8 oz), SpO2 93 %.  Wt Readings from Last 3 Encounters:   06/28/20 79.5 kg (175 lb 4.8 oz)   05/26/20 86.2 kg (190 lb)   05/03/20 84.1 kg (185 lb 6.4 oz)     Intake/Output Summary (Last 24 hours) at 6/28/2020 1417  Last data filed at 6/28/2020 0900  Gross per 24 hour   Intake 990 ml   Output 2920 ml   Net -1930 ml     GENERAL APPEARANCE: pleasant, NAD, alert  HEENT:  eyes/ears/nose/neck grossly nl  RESP: lungs cta b c good efforts, no crackles  CV: RRR, nl S1/S2  ABDOMEN: abd soft, distended  EXTREMITIES/SKIN: 1+ ble edema - L > R (unchanged)         Data:     CBC RESULTS:     Recent Labs   Lab 06/27/20  0539 06/24/20  0630 06/23/20  0705   WBC 4.0 4.2 3.8*   RBC 3.00* 2.94* 2.88*   HGB 9.1* 9.0* 8.8*   HCT 26.9* 26.9* 26.4*    167 162     Basic Metabolic Panel:  Recent Labs   Lab 06/28/20  0607 06/27/20  0539 06/26/20  0823 06/25/20  0555 06/24/20  0630 06/23/20  0705   * 130* 134 132* 136 136   POTASSIUM 3.5 3.3* 3.9 3.8 4.0 4.1   CHLORIDE 100 98 102 102 104 106   CO2 24 24 25 23 25 25   BUN 33* 30 32* 29 30 32*   CR 1.83* 1.88* 2.08* 1.88* 1.83* 1.94*   GLC 92 83 132* 82 88 86   KEV 8.2* 8.6 8.7 8.8 8.9 9.0     INRNo lab results found in last 7 days.   Attestation:   I have reviewed today's relevant vital signs, notes, medications, labs and imaging.    Christian Harvey MD  [facility] - " Nephrology  [phone]

## 2020-06-28 NOTE — PLAN OF CARE
Pt up independently, voiding well, BM, complains of abdominal pain, plan for paracentesis tomorrow, pt has hand tremors at baseline.

## 2020-06-29 ENCOUNTER — APPOINTMENT (OUTPATIENT)
Dept: ULTRASOUND IMAGING | Facility: CLINIC | Age: 65
DRG: 432 | End: 2020-06-29
Attending: HOSPITALIST
Payer: MEDICARE

## 2020-06-29 LAB
ANION GAP SERPL CALCULATED.3IONS-SCNC: 7 MMOL/L (ref 3–14)
BUN SERPL-MCNC: 30 MG/DL (ref 7–30)
CALCIUM SERPL-MCNC: 8.6 MG/DL (ref 8.5–10.1)
CHLORIDE SERPL-SCNC: 99 MMOL/L (ref 94–109)
CO2 SERPL-SCNC: 24 MMOL/L (ref 20–32)
CREAT SERPL-MCNC: 1.88 MG/DL (ref 0.66–1.25)
GFR SERPL CREATININE-BSD FRML MDRD: 36 ML/MIN/{1.73_M2}
GLUCOSE SERPL-MCNC: 99 MG/DL (ref 70–99)
POTASSIUM SERPL-SCNC: 3.5 MMOL/L (ref 3.4–5.3)
SODIUM SERPL-SCNC: 130 MMOL/L (ref 133–144)

## 2020-06-29 PROCEDURE — 99232 SBSQ HOSP IP/OBS MODERATE 35: CPT | Performed by: HOSPITALIST

## 2020-06-29 PROCEDURE — 27210190 US PARACENTESIS

## 2020-06-29 PROCEDURE — 40000863 ZZH STATISTIC RADIOLOGY XRAY, US, CT, MAR, NM

## 2020-06-29 PROCEDURE — 25000132 ZZH RX MED GY IP 250 OP 250 PS 637: Mod: GY | Performed by: PSYCHIATRY & NEUROLOGY

## 2020-06-29 PROCEDURE — 36415 COLL VENOUS BLD VENIPUNCTURE: CPT | Performed by: INTERNAL MEDICINE

## 2020-06-29 PROCEDURE — 25000132 ZZH RX MED GY IP 250 OP 250 PS 637: Mod: GY | Performed by: HOSPITALIST

## 2020-06-29 PROCEDURE — 80048 BASIC METABOLIC PNL TOTAL CA: CPT | Performed by: INTERNAL MEDICINE

## 2020-06-29 PROCEDURE — 25000125 ZZHC RX 250: Performed by: RADIOLOGY

## 2020-06-29 PROCEDURE — 0W9G3ZZ DRAINAGE OF PERITONEAL CAVITY, PERCUTANEOUS APPROACH: ICD-10-PCS | Performed by: RADIOLOGY

## 2020-06-29 PROCEDURE — 12000000 ZZH R&B MED SURG/OB

## 2020-06-29 PROCEDURE — 25000132 ZZH RX MED GY IP 250 OP 250 PS 637: Mod: GY | Performed by: INTERNAL MEDICINE

## 2020-06-29 RX ORDER — ALBUMIN (HUMAN) 12.5 G/50ML
12.5 SOLUTION INTRAVENOUS ONCE
Status: DISCONTINUED | OUTPATIENT
Start: 2020-06-29 | End: 2020-07-01

## 2020-06-29 RX ORDER — LIDOCAINE 40 MG/G
CREAM TOPICAL
Status: DISCONTINUED | OUTPATIENT
Start: 2020-06-29 | End: 2020-07-02 | Stop reason: HOSPADM

## 2020-06-29 RX ORDER — DEXTROSE MONOHYDRATE 25 G/50ML
25-50 INJECTION, SOLUTION INTRAVENOUS
Status: DISCONTINUED | OUTPATIENT
Start: 2020-06-29 | End: 2020-06-30

## 2020-06-29 RX ORDER — NICOTINE POLACRILEX 4 MG
15-30 LOZENGE BUCCAL
Status: DISCONTINUED | OUTPATIENT
Start: 2020-06-29 | End: 2020-06-30

## 2020-06-29 RX ORDER — NICOTINE POLACRILEX 4 MG
15-30 LOZENGE BUCCAL
Status: DISCONTINUED | OUTPATIENT
Start: 2020-06-29 | End: 2020-07-02 | Stop reason: HOSPADM

## 2020-06-29 RX ORDER — SPIRONOLACTONE 25 MG/1
25 TABLET ORAL DAILY
Status: DISCONTINUED | OUTPATIENT
Start: 2020-06-29 | End: 2020-07-02 | Stop reason: HOSPADM

## 2020-06-29 RX ORDER — DEXTROSE MONOHYDRATE 25 G/50ML
25-50 INJECTION, SOLUTION INTRAVENOUS
Status: DISCONTINUED | OUTPATIENT
Start: 2020-06-29 | End: 2020-07-02 | Stop reason: HOSPADM

## 2020-06-29 RX ORDER — POTASSIUM CHLORIDE 1.5 G/1.58G
20 POWDER, FOR SOLUTION ORAL DAILY
Status: DISCONTINUED | OUTPATIENT
Start: 2020-06-30 | End: 2020-06-30

## 2020-06-29 RX ADMIN — PANTOPRAZOLE SODIUM 40 MG: 40 TABLET, DELAYED RELEASE ORAL at 06:33

## 2020-06-29 RX ADMIN — QUETIAPINE FUMARATE 100 MG: 50 TABLET ORAL at 15:54

## 2020-06-29 RX ADMIN — HYDROXYZINE HYDROCHLORIDE 100 MG: 50 TABLET, FILM COATED ORAL at 21:25

## 2020-06-29 RX ADMIN — METHOCARBAMOL 500 MG: 500 TABLET, FILM COATED ORAL at 19:10

## 2020-06-29 RX ADMIN — QUETIAPINE FUMARATE 100 MG: 50 TABLET ORAL at 02:53

## 2020-06-29 RX ADMIN — HYDROXYZINE HYDROCHLORIDE 100 MG: 50 TABLET, FILM COATED ORAL at 08:19

## 2020-06-29 RX ADMIN — TAMSULOSIN HYDROCHLORIDE 0.4 MG: 0.4 CAPSULE ORAL at 19:10

## 2020-06-29 RX ADMIN — QUETIAPINE FUMARATE 100 MG: 50 TABLET ORAL at 09:53

## 2020-06-29 RX ADMIN — POTASSIUM CHLORIDE 20 MEQ: 1.5 POWDER, FOR SOLUTION ORAL at 08:19

## 2020-06-29 RX ADMIN — METHOCARBAMOL 500 MG: 500 TABLET, FILM COATED ORAL at 06:33

## 2020-06-29 RX ADMIN — FUROSEMIDE 40 MG: 40 TABLET ORAL at 08:19

## 2020-06-29 RX ADMIN — DOCUSATE SODIUM AND SENNOSIDES 2 TABLET: 8.6; 5 TABLET, FILM COATED ORAL at 21:25

## 2020-06-29 RX ADMIN — VORTIOXETINE 10 MG: 10 TABLET, FILM COATED ORAL at 08:19

## 2020-06-29 RX ADMIN — DOCUSATE SODIUM AND SENNOSIDES 2 TABLET: 8.6; 5 TABLET, FILM COATED ORAL at 08:19

## 2020-06-29 RX ADMIN — LIDOCAINE HYDROCHLORIDE 10 ML: 10 INJECTION, SOLUTION EPIDURAL; INFILTRATION; INTRACAUDAL; PERINEURAL at 14:36

## 2020-06-29 RX ADMIN — PANTOPRAZOLE SODIUM 40 MG: 40 TABLET, DELAYED RELEASE ORAL at 15:54

## 2020-06-29 RX ADMIN — HYDROXYZINE HYDROCHLORIDE 100 MG: 50 TABLET, FILM COATED ORAL at 15:54

## 2020-06-29 RX ADMIN — FOLIC ACID 1 MG: 1 TABLET ORAL at 08:19

## 2020-06-29 RX ADMIN — FLUTICASONE FUROATE AND VILANTEROL TRIFENATATE 1 PUFF: 200; 25 POWDER RESPIRATORY (INHALATION) at 08:19

## 2020-06-29 RX ADMIN — TRAZODONE HYDROCHLORIDE 100 MG: 50 TABLET ORAL at 21:25

## 2020-06-29 RX ADMIN — MULTIPLE VITAMINS W/ MINERALS TAB 1 TABLET: TAB at 08:19

## 2020-06-29 RX ADMIN — SPIRONOLACTONE 25 MG: 25 TABLET ORAL at 12:42

## 2020-06-29 RX ADMIN — MIRTAZAPINE 30 MG: 15 TABLET, FILM COATED ORAL at 21:25

## 2020-06-29 ASSESSMENT — MIFFLIN-ST. JEOR: SCORE: 1543.31

## 2020-06-29 ASSESSMENT — ACTIVITIES OF DAILY LIVING (ADL)
ADLS_ACUITY_SCORE: 11

## 2020-06-29 NOTE — PROCEDURES
[facility]    Procedure: Paracentesis    Date/Time: 6/29/2020 3:01 PM  Performed by: Rajinder Laws MD  Authorized by: Rajinder Laws MD     UNIVERSAL PROTOCOL   Site Marked: Yes  Prior Images Obtained and Reviewed:  Yes  Required items: Required blood products, implants, devices and special equipment available    Patient identity confirmed:  Verbally with patient  Patient was reevaluated immediately before administering moderate or deep sedation or anesthesia  Confirmation Checklist:  Patient's identity using two indicators, relevant allergies, procedure was appropriate and matched the consent or emergent situation and correct equipment/implants were available  Time out: Immediately prior to the procedure a time out was called    Universal Protocol: the Joint Commission Universal Protocol was followed    Preparation: Patient was prepped and draped in usual sterile fashion          SEDATION    Patient Sedated: No    Vital signs: Vital signs monitored during sedation    See dictated procedure note for full details.  PROCEDURE   Patient Tolerance:  Patient tolerated the procedure well with no immediate complications    Length of time physician/provider present for 1:1 monitoring during sedation: 0

## 2020-06-29 NOTE — PROCEDURES
[facility]    Procedure: Paracentesis    Date/Time: 6/29/2020 3:02 PM  Performed by: Rajinder Laws MD  Authorized by: Rajinder Laws MD     UNIVERSAL PROTOCOL   Site Marked: Yes  Prior Images Obtained and Reviewed:  Yes  Required items: Required blood products, implants, devices and special equipment available    Patient identity confirmed:  Verbally with patient  Patient was reevaluated immediately before administering moderate or deep sedation or anesthesia  Confirmation Checklist:  Patient's identity using two indicators, relevant allergies, procedure was appropriate and matched the consent or emergent situation and correct equipment/implants were available  Time out: Immediately prior to the procedure a time out was called    Universal Protocol: the Joint Commission Universal Protocol was followed    Preparation: Patient was prepped and draped in usual sterile fashion          SEDATION    Patient Sedated: No    See dictated procedure note for full details.  PROCEDURE   Patient Tolerance:  Patient tolerated the procedure well with no immediate complications    Length of time physician/provider present for 1:1 monitoring during sedation: 0

## 2020-06-29 NOTE — PLAN OF CARE
DATE & TIME: 06/29/20 9209-9059  Cognitive Concerns/ Orientation : A&Ox4, calm and cooperative  BEHAVIOR & AGGRESSION TOOL COLOR: Green  CIWA SCORE: N/A  ABNL VS/O2: VSS on RA  MOBILITY: Independent, ambulates in hallways often  PAIN MANAGMENT: PRN Robaxin given x1 and ice packs for back pain  DIET: 2gm Na + diet and 1500 FR.   BOWEL/BLADDER: Up to BR, continent B&B  ABNL LAB/BG: Cr 1.83, Na 132  DRAIN/DEVICES: None  TELEMETRY RHYTHM: N/A  SKIN: Bruised, edema to LLE   TESTS/PROCEDURES: Paracentesis today 6/29  D/C DAY/GOALS/PLACE: Possible discharge today to [place] after the paracentesis.  OTHER IMPORTANT INFO: PRN Seroquel given x1, hand tremors at baseline.

## 2020-06-29 NOTE — PROGRESS NOTES
[facility]    Medicine Progress Note - Hospitalist Service       Date of Admission:  6/10/2020  Assessment & Plan   Jim Richard is a 65 year old male with PMH significant for alcohol abuse, alcoholic cirrhosis with ascites, JANIS, depression and anxiety, history of G.I. bleed with esophageal varices who presented to ER with alcohol intoxication and suicidal ideation. While in ED he also reported some dark stools and was admitted for further evaluation of G.I. bleed     Alcoholic cirrhosis with ascites, needing repeated paracentesis  Alcohol abuse/dependence  - Blood alcohol level on admission 0.32. CIWA monitored, no withdrawal. Clonidine discontinued.  - PTA Lasix and Aldactone initially held on admission given worsening renal function  - Lasix resumed 6/26. Continue to hold spironolactone, defer to nephrology if this can restarted   - S/p paracentesis 6/10 w/ 4L off and 6/15 w/ 4L off  And 6/25 with 3.3 L off   - now abdomen distension getting worse and more tense, will repeat paracentesis 6/29     Acute on CKD, stage III  Hypokalemia  - baseline creatinine around 1.3 - 1.5. Creatinine 1.7 on adm. Likely prerenal secondary to alcohol abuse and poor PO intake  - diuretics adjustment as noted above, aldactone still on hold  - Renal US (6/12) unrevealing except for some evidence of chronic urinary bladder outlet obstruction. Nephrology consulted. Received several doses of IV albumin.   - Cr stable 1.8-1.9, improving from peak 2.12, resumed lasix 6/26. Monitor BMP   - nephrology following;  diuretics adjustment per nephrology  - K 3.3 on 6/26 --- corrected, likely sec to diuresis, continue KCL 20 meq po bid since now resumed on Lasix     Medication noncompliance  Had stopped taking all his medications recently PTA.     Anxiety and depression  Evaluated by psych and restarted on PTA Remeron, Seroquel, trazodone, Trintellix which he has been non-compliant with; were resumed per psychiatry recs  -  Patient continued to feel his anxiety was not controlled.  Psychiatry was reconsulted, Seroquel and Atarax increased 6/18.     Suspected GI bleed with history of esophagitis and esophageal varices  Chronic anemia and thrombocytopenia  Hemodynamically stable. No further melena. Baseline hemoglobin 8-9; stabilized around 8-9. [facility] consulted. EGD 6/11 noted again with grade 1 esophageal varices, moderate portal gastropathy. Received SBP prophylactic ceftriaxone, discontinued 6/13. Does have s/o iron deficiency; got IV iron per nephrology.  - continue Protonix PO bid     Left lower extremity swelling  Hx PAD bypass same leg. No Swelling, redness, pain    - U/s ruled out dvt.     BPH  Flomax     Chronic non-O2 dpnt COPD  PTA inhalers     Breast tenderness  Gynecomastia related to his cirrhosis.      Lt hip pain  Back pain  Appears to have greater trochanteric bursitis. Avoid nsaids given liver disease. Cold pack, avoid pressure to the area.   - will prn robaxin for back pain      Diet: Fluid restriction 1500 ML FLUID  2 Gram Sodium Diet    DVT Prophylaxis: Pneumatic Compression Devices  Leger Catheter: not present  Code Status: Full Code           Disposition Plan   Expected discharge: possibly tomorrow, pending [facility] arrangement/accceptance  Entered: Jude Ledesma MD 06/29/2020, 3:44 PM       The patient's care was discussed with the Bedside Nurse, Care Coordinator/ and Patient.    Jude Ledesma MD  Hospitalist Service  [facility]    ______________________________________________________________________    Interval History   Seen and examined. No new complaints besides some increased abdominal distension. No fevers, chills, or SOB.     Data reviewed today: I reviewed all medications, new labs and imaging results over the last 24 hours. I personally reviewed no images or EKG's today.    Physical Exam   Vital Signs: Temp: 98.1  F (36.7  C) Temp src: Oral BP: (!) 140/75  Pulse: 64 Heart Rate: 59 Resp: 16 SpO2: 97 % O2 Device: None (Room air)    Weight: 177 lbs 6.4 oz    Gen: NAD, pleasant  HEENT: Normocephalic, EOMI, MMM  Resp: no crackles,  no wheezes, no increased work of resp  CV: S1S2 heard, reg rhythm, reg rate, no pedal edema  Abdo: soft, nontender, full, bowel sounds present  Ext: calves nontender, well perfused  Neuro: AAOx3, CN grossly intact, no facial asymmetry      Data   Recent Labs   Lab 06/29/20  0721 06/28/20  0607 06/27/20  0539  06/24/20  0630 06/23/20  0705   WBC  --   --  4.0  --  4.2 3.8*   HGB  --   --  9.1*  --  9.0* 8.8*   MCV  --   --  90  --  92 92   PLT  --   --  190  --  167 162   * 132* 130*   < > 136 136   POTASSIUM 3.5 3.5 3.3*   < > 4.0 4.1   CHLORIDE 99 100 98   < > 104 106   CO2 24 24 24   < > 25 25   BUN 30 33* 30   < > 30 32*   CR 1.88* 1.83* 1.88*   < > 1.83* 1.94*   ANIONGAP 7 8 8   < > 7 5   KEV 8.6 8.2* 8.6   < > 8.9 9.0   GLC 99 92 83   < > 88 86   ALBUMIN  --   --   --   --   --  3.2*    < > = values in this interval not displayed.     Recent Results (from the past 24 hour(s))   US Paracentesis initial: high volume    Narrative    US PARACENTESIS 6/29/2020 2:53 PM    CLINICAL HISTORY: Ascites. THERAPEUTIC (high volume) Paracentesis with  fluid analysis    PROCEDURE: Informed written and verbal consent obtained. Time out  performed. The abdomen was prepped and draped in a sterile fashion. 10  mL of 1% lidocaine was infused into local soft tissues. A 8 [place]  catheter system was introduced into the abdominal ascites under  ultrasound guidance.    3.1 liters of clear, straw-colored fluid were removed and sent to lab  if requested.    Patient tolerated procedure well.    Initial imaging demonstrates large ascites. Subsequent imaging  demonstrates catheter placement.      Impression    IMPRESSION:  1.  Status post ultrasound-guided therapeutic paracentesis with  removal 3.1 L of fluid.    BABAR VAZQUEZ MD

## 2020-06-29 NOTE — PROGRESS NOTES
Paracentesis complete. Additional placement of  2nd catheter placed by Dr Meraz.  Fluid stopped with first one placed. 3,100 ml removed from right side of abdomen.   Med re in color.  Tolerated procedure well, VSS.  Band aid to site CDI, denies pain.  One band aid to site.  Transport called for pt to return to room.  Report to Minerva Rn on unit.

## 2020-06-29 NOTE — PLAN OF CARE
DATE & TIME: 06/29/20 8527-5134  Cognitive Concerns/ Orientation : A&Ox4, calm and cooperative  BEHAVIOR & AGGRESSION TOOL COLOR: Green  CIWA SCORE: N/A  ABNL VS/O2: VSS on RA  MOBILITY: Independent, ambulates in hallways often  PAIN MANAGMENT: Ice packs to back for pain.  DIET: 2gm Na + diet and 1500 FR.   BOWEL/BLADDER: Up to BR, continent B&B  ABNL LAB/BG: Na 130 Creat 1.88  DRAIN/DEVICES: None  TELEMETRY RHYTHM: N/A  SKIN: Bruised, edema to LLE   TESTS/PROCEDURES: Paracentesis this afternoon 6/29, 3,100 ml removed from right side of abd.   D/C DAY/GOALS/PLACE: Discharge to [place] today questionable due to funding.  OTHER IMPORTANT INFO: PRN Seroquel given x1

## 2020-06-29 NOTE — PLAN OF CARE
DATE & TIME: 06/28/20 2753-9924  Cognitive Concerns/ Orientation : A&Ox4, calm and cooperative  BEHAVIOR & AGGRESSION TOOL COLOR: Green  CIWA SCORE: N/A  ABNL VS/O2:VSS on RA  MOBILITY: Independent, ambulates in hallways often  PAIN MANAGMENT: L lower flank pain controlled with PRN Robaxin   DIET: 2gm Na + diet and 1500 FR. Good appetite  BOWEL/BLADDER: Up to BR, continent B&B  ABNL LAB/BG: Cr 1.83, Na 132  DRAIN/DEVICES: None  TELEMETRY RHYTHM: N/A  SKIN: Bruised, edema to LLE   TESTS/PROCEDURES: Paracentesis tomorrow  D/C DAY/GOALS/PLACE: Possible discharge tomorrow or Tuesday to [place].  OTHER IMPORTANT INFO: PRN Seroquel given per pt's request q6h, hand tremors at baseline. PRN Seroquel given q6h.    [place]erm[place] Border Text: The closure involved the vermilion border.

## 2020-06-29 NOTE — PROGRESS NOTES
Assessment and Plan:   NGUYEN/CKD: He is on lasix po. Getting KCL po. Scheduled for paracentesis.   UO 1770 ml yest. Wt adm 86.2,  Wt today 80.5.   Labs show K 3.5, HCO3 24, Cr 1.88.   Will add spironolactone and decrease K replacement.   OK for discharge per IM.            Interval History:   Cirrhosis/ascites.   Alcoholism.   Anemia: Hgb 9.1.                Review of Systems:   Feels well. Has some abd discomfort with swell and some pains. Taking po well. No N or V. No SOB.           Medications:       fluticasone-vilanterol  1 puff Inhalation Daily     folic acid  1 mg Oral Daily     furosemide  40 mg Oral Daily     hydrOXYzine  100 mg Oral TID     mirtazapine  30 mg Oral At Bedtime     multivitamin w/minerals  1 tablet Oral Daily     pantoprazole  40 mg Oral BID AC     potassium chloride  20 mEq Oral BID w/meals     senna-docusate  1 tablet Oral BID    Or     senna-docusate  2 tablet Oral BID     tamsulosin  0.4 mg Oral QPM     traZODone  100 mg Oral At Bedtime     vortioxetine  10 mg Oral Daily         Current active medications and PTA medications reviewed, see medication list for details.            Physical Exam:   Vitals were reviewed  Patient Vitals for the past 24 hrs:   BP Temp Temp src Pulse Heart Rate Resp SpO2 Weight   20 0800 119/65 98.4  F (36.9  C) Oral -- 63 18 95 % --   20 0627 -- -- -- -- -- -- -- 80.5 kg (177 lb 6.4 oz)   20 0246 (!) 143/80 98.2  F (36.8  C) Oral -- 62 18 96 % --   20 1541 (!) 140/74 98.8  F (37.1  C) Oral 56 -- 18 97 % --       Temp:  [98.2  F (36.8  C)-98.8  F (37.1  C)] 98.4  F (36.9  C)  Pulse:  [56] 56  Heart Rate:  [62-63] 63  Resp:  [18] 18  BP: (119-143)/(65-80) 119/65  SpO2:  [95 %-97 %] 95 %    Temperatures:  Current - Temp: 98.4  F (36.9  C); Max - Temp  Av.5  F (36.9  C)  Min: 98.2  F (36.8  C)  Max: 98.8  F (37.1  C)  Respiration range: Resp  Av  Min: 18  Max: 18  Pulse range: Pulse  Av  Min: 56  Max: 56  Blood pressure  range: Systolic (24hrs), Av , Min:119 , Max:143   ; Diastolic (24hrs), Av, Min:65, Max:80    Pulse oximetry range: SpO2  Av %  Min: 95 %  Max: 97 %    I/O last 3 completed shifts:  In: 1710 [P.O.:1710]  Out: 800 [Urine:800]      Intake/Output Summary (Last 24 hours) at 2020 1200  Last data filed at 2020 0800  Gross per 24 hour   Intake 1840 ml   Output 800 ml   Net 1040 ml       Alert and responsive  Lungs with clear BS  Cor RRR nl S1 S2  sys M  LE 1+ edema R, no edema L       Wt Readings from Last 4 Encounters:   20 80.5 kg (177 lb 6.4 oz)   20 86.2 kg (190 lb)   20 84.1 kg (185 lb 6.4 oz)   20 82.1 kg (181 lb)          Data:          Lab Results   Component Value Date     2020     2020     2020    Lab Results   Component Value Date    CHLORIDE 99 2020    CHLORIDE 100 2020    CHLORIDE 98 2020    Lab Results   Component Value Date    BUN 30 2020    BUN 33 2020    BUN 30 2020      Lab Results   Component Value Date    POTASSIUM 3.5 2020    POTASSIUM 3.5 2020    POTASSIUM 3.3 2020    Lab Results   Component Value Date    CO2 24 2020    CO2 24 2020    CO2 24 2020    Lab Results   Component Value Date    CR 1.88 2020    CR 1.83 2020    CR 1.88 2020        Recent Labs   Lab Test 20  0539 20  0630 20  0705   WBC 4.0 4.2 3.8*   HGB 9.1* 9.0* 8.8*   HCT 26.9* 26.9* 26.4*   MCV 90 92 92    167 162     Recent Labs   Lab Test 20  0629 20  0557 06/10/20  0206 20  1215  20  0738  14  0722   AST 47*  --  89* 65*   < >  --    < >  --    ALT 40  --  30 29   < >  --    < >  --    GGT  --   --   --   --   --   --   --  500*   ALKPHOS 207*  --  239* 221*   < >  --    < >  --    BILITOTAL 0.6  --  1.0 1.7*   < >  --    < >  --    ELPIDIO  --  44  --  <10*  --  <10*   < >  --     < > = values in this interval  not displayed.       Recent Labs   Lab Test 06/27/20  0539 06/18/20  0629 02/10/20  0728   MAG 1.7 1.6 1.7     Recent Labs   Lab Test 06/23/20  0705 06/18/20  0629 06/17/20  0849   PHOS 4.4 4.1 3.9     Recent Labs   Lab Test 06/29/20  0721 06/28/20  0607 06/27/20  0539   KEV 8.6 8.2* 8.6       Lab Results   Component Value Date    KEV 8.6 06/29/2020     Lab Results   Component Value Date    WBC 4.0 06/27/2020    HGB 9.1 (L) 06/27/2020    HCT 26.9 (L) 06/27/2020    MCV 90 06/27/2020     06/27/2020     Lab Results   Component Value Date     (L) 06/29/2020    POTASSIUM 3.5 06/29/2020    CHLORIDE 99 06/29/2020    CO2 24 06/29/2020    GLC 99 06/29/2020     Lab Results   Component Value Date    BUN 30 06/29/2020    CR 1.88 (H) 06/29/2020     Lab Results   Component Value Date    MAG 1.7 06/27/2020     Lab Results   Component Value Date    PHOS 4.4 06/23/2020       Creatinine   Date Value Ref Range Status   06/29/2020 1.88 (H) 0.66 - 1.25 mg/dL Final   06/28/2020 1.83 (H) 0.66 - 1.25 mg/dL Final   06/27/2020 1.88 (H) 0.66 - 1.25 mg/dL Final   06/26/2020 2.08 (H) 0.66 - 1.25 mg/dL Final   06/25/2020 1.88 (H) 0.66 - 1.25 mg/dL Final   06/24/2020 1.83 (H) 0.66 - 1.25 mg/dL Final       Attestation:  I have reviewed today's vital signs, notes, medications, labs and imaging.     Arnaldo Frankel MD

## 2020-06-30 LAB
ANION GAP SERPL CALCULATED.3IONS-SCNC: 6 MMOL/L (ref 3–14)
BUN SERPL-MCNC: 29 MG/DL (ref 7–30)
CALCIUM SERPL-MCNC: 8.6 MG/DL (ref 8.5–10.1)
CHLORIDE SERPL-SCNC: 99 MMOL/L (ref 94–109)
CO2 SERPL-SCNC: 25 MMOL/L (ref 20–32)
CREAT SERPL-MCNC: 1.92 MG/DL (ref 0.66–1.25)
GFR SERPL CREATININE-BSD FRML MDRD: 35 ML/MIN/{1.73_M2}
GLUCOSE SERPL-MCNC: 125 MG/DL (ref 70–99)
POTASSIUM SERPL-SCNC: 4 MMOL/L (ref 3.4–5.3)
SODIUM SERPL-SCNC: 130 MMOL/L (ref 133–144)

## 2020-06-30 PROCEDURE — 25000132 ZZH RX MED GY IP 250 OP 250 PS 637: Mod: GY | Performed by: HOSPITALIST

## 2020-06-30 PROCEDURE — 36415 COLL VENOUS BLD VENIPUNCTURE: CPT | Performed by: HOSPITALIST

## 2020-06-30 PROCEDURE — 25000132 ZZH RX MED GY IP 250 OP 250 PS 637: Mod: GY | Performed by: PSYCHIATRY & NEUROLOGY

## 2020-06-30 PROCEDURE — 80048 BASIC METABOLIC PNL TOTAL CA: CPT | Performed by: HOSPITALIST

## 2020-06-30 PROCEDURE — 12000000 ZZH R&B MED SURG/OB

## 2020-06-30 PROCEDURE — 99232 SBSQ HOSP IP/OBS MODERATE 35: CPT | Performed by: HOSPITALIST

## 2020-06-30 PROCEDURE — 25000128 H RX IP 250 OP 636: Performed by: HOSPITALIST

## 2020-06-30 PROCEDURE — 25000132 ZZH RX MED GY IP 250 OP 250 PS 637: Mod: GY | Performed by: INTERNAL MEDICINE

## 2020-06-30 RX ADMIN — MULTIPLE VITAMINS W/ MINERALS TAB 1 TABLET: TAB at 08:23

## 2020-06-30 RX ADMIN — FLUTICASONE FUROATE AND VILANTEROL TRIFENATATE 1 PUFF: 200; 25 POWDER RESPIRATORY (INHALATION) at 08:23

## 2020-06-30 RX ADMIN — MIRTAZAPINE 30 MG: 15 TABLET, FILM COATED ORAL at 21:31

## 2020-06-30 RX ADMIN — VORTIOXETINE 10 MG: 10 TABLET, FILM COATED ORAL at 08:23

## 2020-06-30 RX ADMIN — FUROSEMIDE 40 MG: 40 TABLET ORAL at 08:23

## 2020-06-30 RX ADMIN — DOCUSATE SODIUM AND SENNOSIDES 2 TABLET: 8.6; 5 TABLET, FILM COATED ORAL at 08:22

## 2020-06-30 RX ADMIN — HYDROXYZINE HYDROCHLORIDE 100 MG: 50 TABLET, FILM COATED ORAL at 08:23

## 2020-06-30 RX ADMIN — TRAZODONE HYDROCHLORIDE 100 MG: 50 TABLET ORAL at 21:31

## 2020-06-30 RX ADMIN — QUETIAPINE FUMARATE 100 MG: 50 TABLET ORAL at 08:23

## 2020-06-30 RX ADMIN — POTASSIUM CHLORIDE 20 MEQ: 1.5 POWDER, FOR SOLUTION ORAL at 08:23

## 2020-06-30 RX ADMIN — ONDANSETRON 4 MG: 4 TABLET, ORALLY DISINTEGRATING ORAL at 05:00

## 2020-06-30 RX ADMIN — QUETIAPINE FUMARATE 50 MG: 50 TABLET ORAL at 22:56

## 2020-06-30 RX ADMIN — SPIRONOLACTONE 25 MG: 25 TABLET ORAL at 08:23

## 2020-06-30 RX ADMIN — HYDROXYZINE HYDROCHLORIDE 100 MG: 50 TABLET, FILM COATED ORAL at 16:26

## 2020-06-30 RX ADMIN — FOLIC ACID 1 MG: 1 TABLET ORAL at 08:23

## 2020-06-30 RX ADMIN — PANTOPRAZOLE SODIUM 40 MG: 40 TABLET, DELAYED RELEASE ORAL at 16:26

## 2020-06-30 RX ADMIN — PANTOPRAZOLE SODIUM 40 MG: 40 TABLET, DELAYED RELEASE ORAL at 07:01

## 2020-06-30 RX ADMIN — QUETIAPINE FUMARATE 100 MG: 50 TABLET ORAL at 16:29

## 2020-06-30 RX ADMIN — TAMSULOSIN HYDROCHLORIDE 0.4 MG: 0.4 CAPSULE ORAL at 21:31

## 2020-06-30 RX ADMIN — HYDROXYZINE HYDROCHLORIDE 100 MG: 50 TABLET, FILM COATED ORAL at 21:31

## 2020-06-30 ASSESSMENT — ACTIVITIES OF DAILY LIVING (ADL)
ADLS_ACUITY_SCORE: 11

## 2020-06-30 NOTE — PLAN OF CARE
DATE & TIME: 06/30/20 9533-4670  Cognitive Concerns/ Orientation : A&Ox4, calm and cooperative  BEHAVIOR & AGGRESSION TOOL COLOR: Green PRN Seroquel given x1.  CIWA SCORE: N/A  ABNL VS/O2: VSS on RA  MOBILITY: Independent, ambulates in hallways often  PAIN MANAGMENT:Denies  DIET: 2gm Na + diet, 1500 FR.   BOWEL/BLADDER: Up to BR, continent B&B  ABNL LAB/BG: Na 130, Creat 1.92 MD aware  DRAIN/DEVICES: No IV acces  TELEMETRY RHYTHM: N/A  SKIN: Bruised, edema to LLE, abrasion to R wrist, paracentesis site dressing CDI   TESTS/PROCEDURES: Paracentesis yesterday 6/29, removed 3.1 L   D/C DAY/GOALS/PLACE: Possibly today to [place] pending funding   OTHER IMPORTANT INFO: PRN Seroquel available.

## 2020-06-30 NOTE — PROGRESS NOTES
SPIRITUAL HEALTH SERVICES  SPIRITUAL ASSESSMENT Progress Note  [facility] 65     REFERRAL SOURCE: Follow Up    I shared a reflective follow up visit with patient Jim today:    -Jim reflected on the difficulty of this extended hospital stay, sharing he thought he'd be discharging to [place] last week and then again early this week. He shared stress around the unknowns of when he will discharge and what [place] will be like. He names he feels it is best to go to [place] versus going home.    -Jim reflected on the concept of forgiveness. He shares grief over strained relationships, which he shares are partially from his drinking. He reflected on times in the past when he felt his drinking was managed and he shares he now realizes how much help he needs. We explored 12 step spirituality together and reflected on step 1.     -Jim shares it is most difficult for him to forgive himself from the pain his drinking has caused others and himself, including his physical health. He shared about his grief around his physical health issues. We explored this together and ways he can practice self-compassion.     I offered emotional and spiritual support though reflective listening, spiritual framing and words of comfort and encouragement.     PLAN: I will continue to follow for emotional/spiritual support.     Ellyn Bassett  Associate    Phone: [phone]  Pager: [phone]

## 2020-06-30 NOTE — PROGRESS NOTES
[facility] will accept patient on Thursday with an arrival time of 1000. Patient aware.

## 2020-06-30 NOTE — PROGRESS NOTES
Assessment and Plan:   NGUYEN/CKD-3:   UO yest 1300 ml. Wt stable to slightly increased. Labs show Na 130, K 4.0, HCO3 25, Cr 1.92.   On lasix 40 mg per day, spironolactone 25 mg per day and KCL 20 meq /d.         Stop Mg now that he is on spironolactone.   Cont low dose diuretics.  He can follow up in our office in 2-3 weeks with Dr. Harvey or NP. ([facility], [phone]).      Interval History:   Cirrhosis/ascites: alcoholism.      GI bleed: varices.     BPH  COPD  Anemia:            Review of Systems:   Feels well. C/O nipple tenderness. No abd pain or SOB. Notes some LE edema.           Medications:       albumin human  12.5 g Intravenous Once     fluticasone-vilanterol  1 puff Inhalation Daily     folic acid  1 mg Oral Daily     furosemide  40 mg Oral Daily     hydrOXYzine  100 mg Oral TID     lidocaine (buffered or not buffered)  5 mL Intradermal Once     mirtazapine  30 mg Oral At Bedtime     multivitamin w/minerals  1 tablet Oral Daily     pantoprazole  40 mg Oral BID AC     potassium chloride  20 mEq Oral Daily     senna-docusate  1 tablet Oral BID    Or     senna-docusate  2 tablet Oral BID     sodium chloride (PF)  3 mL Intracatheter Q8H     spironolactone  25 mg Oral Daily     tamsulosin  0.4 mg Oral QPM     traZODone  100 mg Oral At Bedtime     vortioxetine  10 mg Oral Daily       - MEDICATION INSTRUCTIONS -       Current active medications and PTA medications reviewed, see medication list for details.            Physical Exam:   Vitals were reviewed  Patient Vitals for the past 24 hrs:   BP Temp Temp src Pulse Heart Rate Resp SpO2   06/30/20 0843 124/64 98.5  F (36.9  C) Oral -- 64 16 94 %   06/30/20 0356 131/72 98.4  F (36.9  C) Oral -- 60 16 98 %   06/29/20 1500 (!) 140/75 98.1  F (36.7  C) Oral -- 59 16 97 %   06/29/20 1451 (!) 148/66 -- -- 64 -- 16 98 %   06/29/20 1445 (!) 140/65 -- -- 63 -- -- 97 %   06/29/20 1435 (!) 146/67 -- -- 60 -- 16 98 %   06/29/20 1425 (!) 140/69 -- -- -- -- 16 98 %    20 1420 134/66 -- -- 57 -- 16 97 %   20 1415 134/65 -- -- 58 -- 16 98 %   20 1411 (!) 145/61 -- -- 62 -- -- 98 %   20 1405 (!) 146/53 -- -- 61 -- 16 97 %       Temp:  [98.1  F (36.7  C)-98.5  F (36.9  C)] 98.5  F (36.9  C)  Pulse:  [57-64] 64  Heart Rate:  [59-64] 64  Resp:  [16] 16  BP: (124-148)/(53-75) 124/64  SpO2:  [94 %-98 %] 94 %    Temperatures:  Current - Temp: 98.5  F (36.9  C); Max - Temp  Av.3  F (36.8  C)  Min: 98.1  F (36.7  C)  Max: 98.5  F (36.9  C)  Respiration range: Resp  Av  Min: 16  Max: 16  Pulse range: Pulse  Av.7  Min: 57  Max: 64  Blood pressure range: Systolic (24hrs), Av , Min:124 , Max:148   ; Diastolic (24hrs), Av, Min:53, Max:75    Pulse oximetry range: SpO2  Av.3 %  Min: 94 %  Max: 98 %    I/O last 3 completed shifts:  In: 960 [P.O.:960]  Out: 500 [Urine:500]      Intake/Output Summary (Last 24 hours) at 2020 1034  Last data filed at 2020 0800  Gross per 24 hour   Intake 720 ml   Output 1500 ml   Net -780 ml       Alert and responsive  LE 1+ edema  Abd soft and non-tender  Lungs with clear BS  Cor RRR nl S1 S2 no M       Wt Readings from Last 4 Encounters:   20 80.5 kg (177 lb 6.4 oz)   20 86.2 kg (190 lb)   20 84.1 kg (185 lb 6.4 oz)   20 82.1 kg (181 lb)          Data:          Lab Results   Component Value Date     2020     2020     2020    Lab Results   Component Value Date    CHLORIDE 99 2020    CHLORIDE 99 2020    CHLORIDE 100 2020    Lab Results   Component Value Date    BUN PENDING 2020    BUN 30 2020    BUN 33 2020      Lab Results   Component Value Date    POTASSIUM 4.0 2020    POTASSIUM 3.5 2020    POTASSIUM 3.5 2020    Lab Results   Component Value Date    CO2 PENDING 2020    CO2 24 2020    CO2 24 2020    Lab Results   Component Value Date    CR PENDING 2020    CR 1.88  06/29/2020    CR 1.83 06/28/2020        Recent Labs   Lab Test 06/27/20  0539 06/24/20  0630 06/23/20  0705   WBC 4.0 4.2 3.8*   HGB 9.1* 9.0* 8.8*   HCT 26.9* 26.9* 26.4*   MCV 90 92 92    167 162     Recent Labs   Lab Test 06/18/20  0629 06/12/20  0557 06/10/20  0206 05/26/20  1215  05/03/20  0738  06/21/14  0722   AST 47*  --  89* 65*   < >  --    < >  --    ALT 40  --  30 29   < >  --    < >  --    GGT  --   --   --   --   --   --   --  500*   ALKPHOS 207*  --  239* 221*   < >  --    < >  --    BILITOTAL 0.6  --  1.0 1.7*   < >  --    < >  --    ELPIDIO  --  44  --  <10*  --  <10*   < >  --     < > = values in this interval not displayed.       Recent Labs   Lab Test 06/27/20  0539 06/18/20  0629 02/10/20  0728   MAG 1.7 1.6 1.7     Recent Labs   Lab Test 06/23/20  0705 06/18/20  0629 06/17/20  0849   PHOS 4.4 4.1 3.9     Recent Labs   Lab Test 06/30/20  1008 06/29/20  0721 06/28/20  0607   KEV PENDING 8.6 8.2*       Lab Results   Component Value Date    KEV PENDING 06/30/2020     Lab Results   Component Value Date    WBC 4.0 06/27/2020    HGB 9.1 (L) 06/27/2020    HCT 26.9 (L) 06/27/2020    MCV 90 06/27/2020     06/27/2020     Lab Results   Component Value Date     (L) 06/30/2020    POTASSIUM 4.0 06/30/2020    CHLORIDE 99 06/30/2020    CO2 PENDING 06/30/2020    GLC PENDING 06/30/2020     Lab Results   Component Value Date    BUN PENDING 06/30/2020    CR PENDING 06/30/2020     Lab Results   Component Value Date    MAG 1.7 06/27/2020     Lab Results   Component Value Date    PHOS 4.4 06/23/2020       Creatinine   Date Value Ref Range Status   06/30/2020 PENDING 0.66 - 1.25 mg/dL Incomplete   06/29/2020 1.88 (H) 0.66 - 1.25 mg/dL Final   06/28/2020 1.83 (H) 0.66 - 1.25 mg/dL Final   06/27/2020 1.88 (H) 0.66 - 1.25 mg/dL Final   06/26/2020 2.08 (H) 0.66 - 1.25 mg/dL Final   06/25/2020 1.88 (H) 0.66 - 1.25 mg/dL Final       Attestation:  I have reviewed today's vital signs, notes, medications, labs  and imaging.     Arnaldo Frankel MD

## 2020-06-30 NOTE — PLAN OF CARE
DATE & TIME: 06/30/20 2864-1214  Cognitive Concerns/ Orientation : A&Ox4, calm and cooperative  BEHAVIOR & AGGRESSION TOOL COLOR: Green  CIWA SCORE: N/A  ABNL VS/O2: VSS on room air  MOBILITY: Independent, ambulates in hallways often  PAIN MANAGMENT: Ice packs to back for pain. PRN Robaxin available.  DIET: 2gm Na + diet, 1500 FR.   BOWEL/BLADDER: Up to BR, continent B&B  ABNL LAB/BG: Na 130, Creat 1.88  DRAIN/DEVICES: No IV acces  TELEMETRY RHYTHM: N/A  SKIN: Bruised, edema to LLE, abrasion to R wrist, paracentesis site dressing CDI   TESTS/PROCEDURES: Paracentesis yesterday 6/29, removed 3.1 L   D/C DAY/GOALS/PLACE: Possibly today to [place] pending funding   OTHER IMPORTANT INFO: PRN Seroquel available.

## 2020-06-30 NOTE — PLAN OF CARE
DATE & TIME: 06/29/20 7459-4582  Cognitive Concerns/ Orientation : A&Ox4, calm and cooperative  BEHAVIOR & AGGRESSION TOOL COLOR: Green  CIWA SCORE: N/A  ABNL VS/O2: Elevated BP other VSS on room air  MOBILITY: Independent, ambulates in hallways often  PAIN MANAGMENT: Ice packs to back for pain.  DIET: 2gm Na + diet, 1500 FR.   BOWEL/BLADDER: Up to BR, continent B&B  ABNL LAB/BG: Na 130, Creat 1.88  DRAIN/DEVICES: No IV acces  TELEMETRY RHYTHM: N/A  SKIN: Bruised, edema to LLE, abrasion to R wrist, paracentesis site dressing CDI   TESTS/PROCEDURES: Paracentesis this afternoon 6/29, removed 3100 ml.   D/C DAY/GOALS/PLACE: Possibly tomorrow to [facility] pending funding   OTHER IMPORTANT INFO: PRN Seroquel given x1.

## 2020-06-30 NOTE — PROGRESS NOTES
[facility]    Medicine Progress Note - Hospitalist Service       Date of Admission:  6/10/2020  Assessment & Plan   Jim Richard is a 65 year old male with PMH significant for alcohol abuse, alcoholic cirrhosis with ascites, JANIS, depression and anxiety, history of G.I. bleed with esophageal varices who presented to ER with alcohol intoxication and suicidal ideation. While in ED he also reported some dark stools and was admitted for further evaluation of G.I. bleed     Alcoholic cirrhosis with ascites, needing repeated paracentesis  Alcohol abuse/dependence  - Blood alcohol level on admission 0.32. CIWA monitored, no withdrawal. Clonidine discontinued.  - PTA Lasix and Aldactone initially held on admission given worsening renal function  - Lasix resumed 6/26. Continue to hold spironolactone, defer to nephrology if this can restarted   - S/p paracentesis 6/10 w/ 4L off and 6/15 w/ 4L off  And 6/25 with 3.3 L off   - s/p paracentesis 6/29 w/ 3.1 L taken off.   - 6/30 feeling improved     Acute on CKD, stage III  Hypokalemia - normalized  - baseline creatinine around 1.3 - 1.5. Creatinine 1.7 on adm. Likely prerenal secondary to alcohol abuse and poor PO intake  - diuretics adjustment as noted above, aldactone still on hold  - Renal US (6/12) unrevealing except for some evidence of chronic urinary bladder outlet obstruction. Received several doses of IV albumin.   - Cr stable 1.8-1.9, improving from peak 2.12, resumed lasix 6/26. Monitor BMP   - nephrology following;  diuretics adjustment per nephrology  - K 3.3 on 6/26 --- corrected, likely sec to diuresis  - 6/29 nephro resumed spironolactone, stopped KCl supplement  - 6/30 labs pending, K 4.0     Medication noncompliance  Had stopped taking all his medications recently PTA.     Anxiety and depression  Evaluated by psych and restarted on PTA Remeron, Seroquel, trazodone, Trintellix which he has been non-compliant with; were resumed per  psychiatry recs  - Patient continued to feel his anxiety was not controlled.  Psychiatry was reconsulted, Seroquel and Atarax increased 6/18.     Suspected GI bleed with history of esophagitis and esophageal varices  Chronic anemia and thrombocytopenia  Hemodynamically stable. No further melena. Baseline hemoglobin 8-9; stabilized around 8-9. [facility] consulted. EGD 6/11 noted again with grade 1 esophageal varices, moderate portal gastropathy. Received SBP prophylactic ceftriaxone, discontinued 6/13. Does have s/o iron deficiency; got IV iron per nephrology.  - continue Protonix PO bid     Left lower extremity swelling  Hx PAD bypass same leg. No Swelling, redness, pain    - U/s ruled out dvt.     BPH  Flomax     Chronic non-O2 dpnt COPD  PTA inhalers     Breast tenderness  Gynecomastia related to his cirrhosis.      Lt hip pain  Back pain  Appears to have greater trochanteric bursitis. Avoid nsaids given liver disease. Cold pack, avoid pressure to the area.   - will prn robaxin for back pain      Diet: Fluid restriction 1500 ML FLUID  2 Gram Sodium Diet    DVT Prophylaxis: Pneumatic Compression Devices and Ambulate every shift  Leger Catheter: not present  Code Status: Full Code           Disposition Plan   Expected discharge: pending [employer] funding getting figured out through the [facility]  Entered: Jude Ledesma MD 06/30/2020, 10:32 AM       The patient's care was discussed with the Bedside Nurse and Patient.    Jude Ledesma MD  Hospitalist Service  [facility]    ______________________________________________________________________    Interval History   Seen and examined. No fevers, chills, or sob. Abdomen feeling better since tap yesterday. Going to the bathroom appropriately.     Data reviewed today: I reviewed all medications, new labs and imaging results over the last 24 hours. I personally reviewed no images or EKG's today.    Physical Exam   Vital Signs: Temp: 98.5  F (36.9  C) Temp  src: Oral BP: 124/64 Pulse: 64 Heart Rate: 64 Resp: 16 SpO2: 94 % O2 Device: None (Room air)    Weight: 177 lbs 6.4 oz    Gen: NAD, pleasant  HEENT: Normocephalic, EOMI, MMM  Resp: no crackles,  no wheezes, no increased work of resp  CV: S1S2 heard, reg rhythm, reg rate, no pedal edema  Abdo: soft, nontender, nondistended, less full than yesterday, bowel sounds present  Ext: calves nontender, well perfused  Neuro: AAOx3, CN grossly intact, no facial asymmetry      Data   Recent Labs   Lab 06/30/20  1008 06/29/20  0721 06/28/20  0607 06/27/20  0539  06/24/20  0630   WBC  --   --   --  4.0  --  4.2   HGB  --   --   --  9.1*  --  9.0*   MCV  --   --   --  90  --  92   PLT  --   --   --  190  --  167   * 130* 132* 130*   < > 136   POTASSIUM 4.0 3.5 3.5 3.3*   < > 4.0   CHLORIDE 99 99 100 98   < > 104   CO2 PENDING 24 24 24   < > 25   BUN PENDING 30 33* 30   < > 30   CR PENDING 1.88* 1.83* 1.88*   < > 1.83*   ANIONGAP PENDING 7 8 8   < > 7   KEV PENDING 8.6 8.2* 8.6   < > 8.9   GLC PENDING 99 92 83   < > 88    < > = values in this interval not displayed.     Recent Results (from the past 24 hour(s))   US Paracentesis initial: high volume    Narrative    US PARACENTESIS 6/29/2020 2:53 PM    CLINICAL HISTORY: Ascites. THERAPEUTIC (high volume) Paracentesis with  fluid analysis    PROCEDURE: Informed written and verbal consent obtained. Time out  performed. The abdomen was prepped and draped in a sterile fashion. 10  mL of 1% lidocaine was infused into local soft tissues. A 8 Gren[place]  catheter system was introduced into the abdominal ascites under  ultrasound guidance.    3.1 liters of clear, straw-colored fluid were removed and sent to lab  if requested.    Patient tolerated procedure well.    Initial imaging demonstrates large ascites. Subsequent imaging  demonstrates catheter placement.      Impression    IMPRESSION:  1.  Status post ultrasound-guided therapeutic paracentesis with  removal 3.1 L of fluid.    BABAR  MARIBEL VAZQUEZ MD

## 2020-07-01 PROBLEM — R39.198 DIFFICULTY VOIDING: Status: ACTIVE | Noted: 2020-07-01

## 2020-07-01 LAB
ANION GAP SERPL CALCULATED.3IONS-SCNC: 7 MMOL/L (ref 3–14)
BUN SERPL-MCNC: 29 MG/DL (ref 7–30)
CALCIUM SERPL-MCNC: 8.7 MG/DL (ref 8.5–10.1)
CHLORIDE SERPL-SCNC: 95 MMOL/L (ref 94–109)
CO2 SERPL-SCNC: 25 MMOL/L (ref 20–32)
CREAT SERPL-MCNC: 1.96 MG/DL (ref 0.66–1.25)
GFR SERPL CREATININE-BSD FRML MDRD: 35 ML/MIN/{1.73_M2}
GLUCOSE SERPL-MCNC: 96 MG/DL (ref 70–99)
POTASSIUM SERPL-SCNC: 3.5 MMOL/L (ref 3.4–5.3)
SODIUM SERPL-SCNC: 127 MMOL/L (ref 133–144)

## 2020-07-01 PROCEDURE — 80048 BASIC METABOLIC PNL TOTAL CA: CPT | Performed by: HOSPITALIST

## 2020-07-01 PROCEDURE — 25000132 ZZH RX MED GY IP 250 OP 250 PS 637: Mod: GY | Performed by: HOSPITALIST

## 2020-07-01 PROCEDURE — 99232 SBSQ HOSP IP/OBS MODERATE 35: CPT | Performed by: HOSPITALIST

## 2020-07-01 PROCEDURE — 36415 COLL VENOUS BLD VENIPUNCTURE: CPT | Performed by: HOSPITALIST

## 2020-07-01 PROCEDURE — 25000132 ZZH RX MED GY IP 250 OP 250 PS 637: Mod: GY | Performed by: INTERNAL MEDICINE

## 2020-07-01 PROCEDURE — 12000000 ZZH R&B MED SURG/OB

## 2020-07-01 PROCEDURE — 25000132 ZZH RX MED GY IP 250 OP 250 PS 637: Mod: GY | Performed by: PSYCHIATRY & NEUROLOGY

## 2020-07-01 RX ORDER — QUETIAPINE FUMARATE 50 MG/1
50-100 TABLET, FILM COATED ORAL EVERY 6 HOURS PRN
Qty: 120 TABLET | Refills: 0 | Status: SHIPPED | OUTPATIENT
Start: 2020-07-01 | End: 2020-08-20

## 2020-07-01 RX ORDER — PANTOPRAZOLE SODIUM 40 MG/1
40 TABLET, DELAYED RELEASE ORAL
Qty: 60 TABLET | Refills: 0 | Status: SHIPPED | OUTPATIENT
Start: 2020-07-01 | End: 2020-07-31

## 2020-07-01 RX ORDER — TRAZODONE HYDROCHLORIDE 100 MG/1
100 TABLET ORAL AT BEDTIME
Qty: 30 TABLET | Refills: 0 | Status: ON HOLD | OUTPATIENT
Start: 2020-07-01 | End: 2020-10-06

## 2020-07-01 RX ORDER — TAMSULOSIN HYDROCHLORIDE 0.4 MG/1
0.4 CAPSULE ORAL EVERY EVENING
Qty: 30 CAPSULE | Refills: 0 | Status: SHIPPED | OUTPATIENT
Start: 2020-07-01 | End: 2020-08-10

## 2020-07-01 RX ORDER — FOLIC ACID 1 MG/1
1 TABLET ORAL DAILY
Qty: 30 TABLET | Refills: 0 | Status: SHIPPED | OUTPATIENT
Start: 2020-07-02 | End: 2020-08-01

## 2020-07-01 RX ORDER — SPIRONOLACTONE 25 MG/1
25 TABLET ORAL DAILY
Qty: 30 TABLET | Refills: 0 | Status: SHIPPED | OUTPATIENT
Start: 2020-07-02 | End: 2020-08-20

## 2020-07-01 RX ORDER — FUROSEMIDE 20 MG
20 TABLET ORAL DAILY
Qty: 30 TABLET | Refills: 0 | Status: ON HOLD | OUTPATIENT
Start: 2020-07-02 | End: 2020-10-01 | Stop reason: DRUGHIGH

## 2020-07-01 RX ORDER — HYDROXYZINE HYDROCHLORIDE 50 MG/1
100 TABLET, FILM COATED ORAL 3 TIMES DAILY
Qty: 180 TABLET | Refills: 0 | Status: SHIPPED | OUTPATIENT
Start: 2020-07-01 | End: 2020-08-10

## 2020-07-01 RX ORDER — FUROSEMIDE 20 MG
20 TABLET ORAL DAILY
Status: DISCONTINUED | OUTPATIENT
Start: 2020-07-02 | End: 2020-07-02 | Stop reason: HOSPADM

## 2020-07-01 RX ORDER — MULTIPLE VITAMINS W/ MINERALS TAB 9MG-400MCG
1 TAB ORAL DAILY
Qty: 30 EACH | Refills: 0 | Status: ON HOLD | OUTPATIENT
Start: 2020-07-01 | End: 2020-11-18

## 2020-07-01 RX ORDER — POTASSIUM CHLORIDE 1.5 G/1.58G
20 POWDER, FOR SOLUTION ORAL 2 TIMES DAILY WITH MEALS
Qty: 60 PACKET | Refills: 0 | Status: SHIPPED | OUTPATIENT
Start: 2020-07-01 | End: 2020-08-10

## 2020-07-01 RX ORDER — MIRTAZAPINE 30 MG/1
30 TABLET, FILM COATED ORAL AT BEDTIME
Qty: 30 TABLET | Refills: 0 | Status: SHIPPED | OUTPATIENT
Start: 2020-07-01 | End: 2020-08-20

## 2020-07-01 RX ORDER — POTASSIUM CHLORIDE 1.5 G/1.58G
20 POWDER, FOR SOLUTION ORAL 2 TIMES DAILY WITH MEALS
Status: COMPLETED | OUTPATIENT
Start: 2020-07-01 | End: 2020-07-02

## 2020-07-01 RX ORDER — METHOCARBAMOL 500 MG/1
500 TABLET, FILM COATED ORAL 3 TIMES DAILY PRN
Qty: 60 TABLET | Refills: 0 | Status: SHIPPED | OUTPATIENT
Start: 2020-07-01 | End: 2020-08-10

## 2020-07-01 RX ORDER — MAGNESIUM OXIDE 400 MG/1
400 TABLET ORAL 2 TIMES DAILY
Status: DISCONTINUED | OUTPATIENT
Start: 2020-07-01 | End: 2020-07-02 | Stop reason: HOSPADM

## 2020-07-01 RX ORDER — MAGNESIUM OXIDE 400 MG/1
400 TABLET ORAL 2 TIMES DAILY
Qty: 60 TABLET | Refills: 0 | Status: SHIPPED | OUTPATIENT
Start: 2020-07-01 | End: 2020-08-10

## 2020-07-01 RX ADMIN — QUETIAPINE FUMARATE 100 MG: 50 TABLET ORAL at 21:56

## 2020-07-01 RX ADMIN — METHOCARBAMOL 500 MG: 500 TABLET, FILM COATED ORAL at 10:30

## 2020-07-01 RX ADMIN — POTASSIUM CHLORIDE 20 MEQ: 1.5 POWDER, FOR SOLUTION ORAL at 17:31

## 2020-07-01 RX ADMIN — DOCUSATE SODIUM AND SENNOSIDES 2 TABLET: 8.6; 5 TABLET, FILM COATED ORAL at 21:11

## 2020-07-01 RX ADMIN — PANTOPRAZOLE SODIUM 40 MG: 40 TABLET, DELAYED RELEASE ORAL at 06:59

## 2020-07-01 RX ADMIN — SPIRONOLACTONE 25 MG: 25 TABLET ORAL at 08:08

## 2020-07-01 RX ADMIN — MULTIPLE VITAMINS W/ MINERALS TAB 1 TABLET: TAB at 08:08

## 2020-07-01 RX ADMIN — QUETIAPINE FUMARATE 100 MG: 50 TABLET ORAL at 08:09

## 2020-07-01 RX ADMIN — HYDROXYZINE HYDROCHLORIDE 100 MG: 50 TABLET, FILM COATED ORAL at 16:05

## 2020-07-01 RX ADMIN — MIRTAZAPINE 30 MG: 15 TABLET, FILM COATED ORAL at 21:11

## 2020-07-01 RX ADMIN — METHOCARBAMOL 500 MG: 500 TABLET, FILM COATED ORAL at 19:18

## 2020-07-01 RX ADMIN — FUROSEMIDE 40 MG: 40 TABLET ORAL at 08:08

## 2020-07-01 RX ADMIN — VORTIOXETINE 10 MG: 10 TABLET, FILM COATED ORAL at 08:08

## 2020-07-01 RX ADMIN — PANTOPRAZOLE SODIUM 40 MG: 40 TABLET, DELAYED RELEASE ORAL at 16:06

## 2020-07-01 RX ADMIN — FLUTICASONE FUROATE AND VILANTEROL TRIFENATATE 1 PUFF: 200; 25 POWDER RESPIRATORY (INHALATION) at 08:07

## 2020-07-01 RX ADMIN — TAMSULOSIN HYDROCHLORIDE 0.4 MG: 0.4 CAPSULE ORAL at 21:11

## 2020-07-01 RX ADMIN — QUETIAPINE FUMARATE 50 MG: 50 TABLET ORAL at 01:38

## 2020-07-01 RX ADMIN — HYDROXYZINE HYDROCHLORIDE 100 MG: 50 TABLET, FILM COATED ORAL at 21:11

## 2020-07-01 RX ADMIN — QUETIAPINE FUMARATE 100 MG: 50 TABLET ORAL at 16:06

## 2020-07-01 RX ADMIN — HYDROXYZINE HYDROCHLORIDE 100 MG: 50 TABLET, FILM COATED ORAL at 08:07

## 2020-07-01 RX ADMIN — DOCUSATE SODIUM AND SENNOSIDES 2 TABLET: 8.6; 5 TABLET, FILM COATED ORAL at 08:07

## 2020-07-01 RX ADMIN — FOLIC ACID 1 MG: 1 TABLET ORAL at 08:07

## 2020-07-01 RX ADMIN — Medication 400 MG: at 21:11

## 2020-07-01 RX ADMIN — TRAZODONE HYDROCHLORIDE 100 MG: 50 TABLET ORAL at 21:11

## 2020-07-01 ASSESSMENT — ACTIVITIES OF DAILY LIVING (ADL)
ADLS_ACUITY_SCORE: 11

## 2020-07-01 NOTE — PROGRESS NOTES
Assessment and Plan:   NGUYEN/CKD: Cr slightly worse last 4 days. Na decreasing last 4 days. K and Mg borderline.   Na has decreased to 127.     Replace K and Mg po. Fluid restriction to 1500 ml.   Decrease lasix to 20 mg po daily. Cont spironolactone.  Monitor labs as outpt. Ok for discharge.            Interval History:   Cirrhosis/ascites: S/P paracentesis on .  alcoholism.      GI bleed: varices.     BPH  COPD  Anemia:                  Review of Systems:   No pain. Ambulating in halls.           Medications:       albumin human  12.5 g Intravenous Once     fluticasone-vilanterol  1 puff Inhalation Daily     folic acid  1 mg Oral Daily     furosemide  40 mg Oral Daily     hydrOXYzine  100 mg Oral TID     lidocaine (buffered or not buffered)  5 mL Intradermal Once     mirtazapine  30 mg Oral At Bedtime     multivitamin w/minerals  1 tablet Oral Daily     pantoprazole  40 mg Oral BID AC     senna-docusate  1 tablet Oral BID    Or     senna-docusate  2 tablet Oral BID     sodium chloride (PF)  3 mL Intracatheter Q8H     spironolactone  25 mg Oral Daily     tamsulosin  0.4 mg Oral QPM     traZODone  100 mg Oral At Bedtime     vortioxetine  10 mg Oral Daily       - MEDICATION INSTRUCTIONS -       Current active medications and PTA medications reviewed, see medication list for details.            Physical Exam:   Vitals were reviewed  Patient Vitals for the past 24 hrs:   BP Temp Temp src Pulse Heart Rate Resp SpO2   20 0726 (!) 143/76 98.5  F (36.9  C) Oral -- 60 16 96 %   20 2357 136/70 98.6  F (37  C) Oral 58 -- 16 97 %   20 1522 134/67 98.4  F (36.9  C) Oral -- 68 16 93 %       Temp:  [98.4  F (36.9  C)-98.6  F (37  C)] 98.5  F (36.9  C)  Pulse:  [58] 58  Heart Rate:  [60-68] 60  Resp:  [16] 16  BP: (134-143)/(67-76) 143/76  SpO2:  [93 %-97 %] 96 %    Temperatures:  Current - Temp: 98.5  F (36.9  C); Max - Temp  Av.5  F (36.9  C)  Min: 98.4  F (36.9  C)  Max: 98.6  F (37   C)  Respiration range: Resp  Av  Min: 16  Max: 16  Pulse range: Pulse  Av  Min: 58  Max: 58  Blood pressure range: Systolic (24hrs), Av , Min:134 , Max:143   ; Diastolic (24hrs), Av, Min:67, Max:76    Pulse oximetry range: SpO2  Av.3 %  Min: 93 %  Max: 97 %    I/O last 3 completed shifts:  In: 240 [P.O.:240]  Out: 3175 [Urine:3175]      Intake/Output Summary (Last 24 hours) at 2020 1335  Last data filed at 2020 0641  Gross per 24 hour   Intake --   Output 1675 ml   Net -1675 ml       Alert and responsive  Lungs with clear BS  Cor RRR nl S1 S2 no M  LE 1+ edema       Wt Readings from Last 4 Encounters:   20 80.5 kg (177 lb 6.4 oz)   20 86.2 kg (190 lb)   20 84.1 kg (185 lb 6.4 oz)   20 82.1 kg (181 lb)          Data:          Lab Results   Component Value Date     2020     2020     2020    Lab Results   Component Value Date    CHLORIDE 95 2020    CHLORIDE 99 2020    CHLORIDE 99 2020    Lab Results   Component Value Date    BUN 29 2020    BUN 29 2020    BUN 30 2020      Lab Results   Component Value Date    POTASSIUM 3.5 2020    POTASSIUM 4.0 2020    POTASSIUM 3.5 2020      Lab Results   Component Value Date    CO2 25 2020    CO2 25 2020    CO2 24 2020    Lab Results   Component Value Date    CR 1.96 2020    CR 1.92 2020    CR 1.88 2020        Recent Labs   Lab Test 20  0539 20  0630 20  0705   WBC 4.0 4.2 3.8*   HGB 9.1* 9.0* 8.8*   HCT 26.9* 26.9* 26.4*   MCV 90 92 92    167 162     Recent Labs   Lab Test 20  0629 20  0557 06/10/20  0206 20  1215  20  0738  14  0722   AST 47*  --  89* 65*   < >  --    < >  --    ALT 40  --  30 29   < >  --    < >  --    GGT  --   --   --   --   --   --   --  500*   ALKPHOS 207*  --  239* 221*   < >  --    < >  --    BILITOTAL 0.6  --  1.0 1.7*   < >   --    < >  --    ELPIDIO  --  44  --  <10*  --  <10*   < >  --     < > = values in this interval not displayed.       Recent Labs   Lab Test 06/27/20  0539 06/18/20  0629 02/10/20  0728   MAG 1.7 1.6 1.7     Recent Labs   Lab Test 06/23/20  0705 06/18/20  0629 06/17/20  0849   PHOS 4.4 4.1 3.9     Recent Labs   Lab Test 07/01/20  0734 06/30/20  1008 06/29/20  0721   KEV 8.7 8.6 8.6       Lab Results   Component Value Date    KEV 8.7 07/01/2020     Lab Results   Component Value Date    WBC 4.0 06/27/2020    HGB 9.1 (L) 06/27/2020    HCT 26.9 (L) 06/27/2020    MCV 90 06/27/2020     06/27/2020     Lab Results   Component Value Date     (L) 07/01/2020    POTASSIUM 3.5 07/01/2020    CHLORIDE 95 07/01/2020    CO2 25 07/01/2020    GLC 96 07/01/2020     Lab Results   Component Value Date    BUN 29 07/01/2020    CR 1.96 (H) 07/01/2020     Lab Results   Component Value Date    MAG 1.7 06/27/2020     Lab Results   Component Value Date    PHOS 4.4 06/23/2020       Creatinine   Date Value Ref Range Status   07/01/2020 1.96 (H) 0.66 - 1.25 mg/dL Final   06/30/2020 1.92 (H) 0.66 - 1.25 mg/dL Final   06/29/2020 1.88 (H) 0.66 - 1.25 mg/dL Final   06/28/2020 1.83 (H) 0.66 - 1.25 mg/dL Final   06/27/2020 1.88 (H) 0.66 - 1.25 mg/dL Final   06/26/2020 2.08 (H) 0.66 - 1.25 mg/dL Final       Attestation:  I have reviewed today's vital signs, notes, medications, labs and imaging.     Arnaldo Frankel MD

## 2020-07-01 NOTE — PROGRESS NOTES
[facility]    Medicine Progress Note - Hospitalist Service       Date of Admission:  6/10/2020  Assessment & Plan   Jim Richard is a 65 year old male with PMH significant for alcohol abuse, alcoholic cirrhosis with ascites, JANIS, depression and anxiety, history of G.I. bleed with esophageal varices who presented to ER with alcohol intoxication and suicidal ideation. While in ED he also reported some dark stools and was admitted for further evaluation of G.I. bleed     Alcoholic cirrhosis with ascites, needing repeated paracentesis  Alcohol abuse/dependence  - Blood alcohol level on admission 0.32. CIWA monitored, no withdrawal. Clonidine discontinued.  - PTA Lasix and Aldactone initially held on admission given worsening renal function  - Lasix resumed 6/26. Continue to hold spironolactone, defer to nephrology if this can restarted   - S/p paracentesis 6/10 w/ 4L off and 6/15 w/ 4L off  And 6/25 with 3.3 L off   - s/p paracentesis 6/29 w/ 3.1 L taken off.   - 6/30 - 7/1 feeling improved     Acute on CKD, stage III  Hypokalemia - normalized  - baseline creatinine around 1.3 - 1.5. Creatinine 1.7 on adm. Likely prerenal secondary to alcohol abuse and poor PO intake  - diuretics adjustment as noted above, aldactone still on hold  - Renal US (6/12) unrevealing except for some evidence of chronic urinary bladder outlet obstruction. Received several doses of IV albumin.   - Cr stable 1.8-1.9, improving from peak 2.12, resumed lasix 6/26. Monitor BMP   - nephrology following;  diuretics adjustment per nephrology  - K 3.3 on 6/26 --- corrected, likely sec to diuresis  - 6/29 nephro resumed spironolactone, stopped KCl supplement  - 6/30 Na 130, K 4.0, creat 1.92  - 7/1 Na 127, K 3.5, creat 1.96  -nephrology decreased Lasix to 20 daily, potassium and magnesium supplements daily  - Planning discharge for 7/2 a.m.-paracentesis planned for 7/7-appreciate CT/SW assistance     Medication noncompliance  Had  stopped taking all his medications recently PTA.     Anxiety and depression  Evaluated by psych and restarted on PTA Remeron, Seroquel, trazodone, Trintellix which he has been non-compliant with; were resumed per psychiatry recs  - Patient continued to feel his anxiety was not controlled.  Psychiatry was reconsulted, Seroquel and Atarax increased 6/18.     Suspected GI bleed with history of esophagitis and esophageal varices  Chronic anemia and thrombocytopenia  Hemodynamically stable. No further melena. Baseline hemoglobin 8-9; stabilized around 8-9. [facility] consulted. EGD 6/11 noted again with grade 1 esophageal varices, moderate portal gastropathy. Received SBP prophylactic ceftriaxone, discontinued 6/13. Does have s/o iron deficiency; got IV iron per nephrology.  - continue Protonix PO bid     Left lower extremity swelling  Hx PAD bypass same leg. No Swelling, redness, pain    - U/s ruled out dvt.     BPH  Flomax     Chronic non-O2 dpnt COPD  PTA inhalers     Breast tenderness  Gynecomastia related to his cirrhosis.      Lt hip pain  Back pain  Appears to have greater trochanteric bursitis. Avoid nsaids given liver disease. Cold pack, avoid pressure to the area.   - will have prn robaxin for back pain      Diet: Fluid restriction 1500 ML FLUID  2 Gram Sodium Diet    DVT Prophylaxis: Pneumatic Compression Devices and ambulate  Leger Catheter: not present  Code Status: Full Code           Disposition Plan   Expected discharge: Planning tomorrow late morning to [place]  Entered: Jude Ledesma MD 07/01/2020, 3:23 PM       The patient's care was discussed with the Bedside Nurse, Care Coordinator/ and Patient.    Jude Ledesma MD  Hospitalist Service  [facility]    ______________________________________________________________________    Interval History   Patient was seen and examined.  Patient up in chair with no new complaints.  No abdominal pain or new distention.  Denies  fevers or chills.  Sodium slightly lower today but not concerning.  Appreciate nephrology assistance ongoing.  Planning on discharge tomorrow morning.    Data reviewed today: I reviewed all medications, new labs and imaging results over the last 24 hours. I personally reviewed no images or EKG's today.    Physical Exam   Vital Signs: Temp: 98.5  F (36.9  C) Temp src: Oral BP: (!) 143/76 Pulse: 58 Heart Rate: 60 Resp: 16 SpO2: 96 % O2 Device: None (Room air)    Weight: 177 lbs 6.4 oz    Gen: NAD, pleasant  HEENT: Normocephalic, EOMI, MMM  Resp: no crackles,  no wheezes, no increased work of resp  CV: S1S2 heard, reg rhythm, reg rate, no pedal edema  Abdo: soft, nontender, nondistended, bowel sounds present  Ext: calves nontender, well perfused  Neuro: AAOx3, CN grossly intact, no facial asymmetry      Data   Recent Labs   Lab 07/01/20  0734 06/30/20  1008 06/29/20  0721  06/27/20  0539   WBC  --   --   --   --  4.0   HGB  --   --   --   --  9.1*   MCV  --   --   --   --  90   PLT  --   --   --   --  190   * 130* 130*   < > 130*   POTASSIUM 3.5 4.0 3.5   < > 3.3*   CHLORIDE 95 99 99   < > 98   CO2 25 25 24   < > 24   BUN 29 29 30   < > 30   CR 1.96* 1.92* 1.88*   < > 1.88*   ANIONGAP 7 6 7   < > 8   KEV 8.7 8.6 8.6   < > 8.6   GLC 96 125* 99   < > 83    < > = values in this interval not displayed.     No results found for this or any previous visit (from the past 24 hour(s)).   Regular rate and rhythm, Heart sounds S1 S2 present, no murmurs, rubs or gallops

## 2020-07-01 NOTE — PROVIDER NOTIFICATION
MD Notification    Notified Person: MD Ledesma    Notification Date/Time: 07/1/20  0890    Notification Interaction: Web page and in person    Purpose of Notification: Na 127    Orders Received:    Comments:

## 2020-07-01 NOTE — PLAN OF CARE
DATE & TIME: 06/30/20 2305-9507  Cognitive Concerns/ Orientation : A&Ox4, calm and cooperative  BEHAVIOR & AGGRESSION TOOL COLOR: Green   CIWA SCORE: N/A  ABNL VS/O2: VSS on RA  MOBILITY: Independent, ambulates in hallways often  PAIN MANAGMENT:Denies  DIET: 2gm Na + diet, 1500 FR.   BOWEL/BLADDER: Up to BR, continent B&B  ABNL LAB/BG: Na 130, Creat 1.92 DRAIN/DEVICES: No IV acces  TELEMETRY RHYTHM: N/A  SKIN: Bruised, edema to LLE, abrasion to R wrist, paracentesis site dressing CDI   TESTS/PROCEDURES: Paracentesis yesterday 6/29, removed 3.1 L   D/C DAY/GOALS/PLACE: Possibly Thursday  to [facility]   OTHER IMPORTANT INFO: PRN Seroquel given x2

## 2020-07-01 NOTE — PLAN OF CARE
DATE & TIME: 07/01/20 3178-5756  Cognitive Concerns/ Orientation : A&Ox4, calm and cooperative  BEHAVIOR & AGGRESSION TOOL COLOR: Green   CIWA SCORE: N/A  ABNL VS/O2: VSS on RA  MOBILITY: Independent,  PAIN MANAGMENT:Denies  DIET: 2gm Na + diet, 1500 FR.   BOWEL/BLADDER: Up to BR, continent B&B  ABNL LAB/BG: Na 130, Creat 1.92 DRAIN/DEVICES: No IV access  TELEMETRY RHYTHM: N/A  SKIN: Bruised, edema to LLE, abrasion to R wrist, paracentesis site dressing CDI   TESTS/PROCEDURES: N/A  D/C DAY/GOALS/PLACE: Thursday to [facility] @10 am  OTHER IMPORTANT INFO: PRN Seroquel given 1x, had difficulty sleeping tonight

## 2020-07-01 NOTE — PLAN OF CARE
DATE & TIME: 07/1/20 8497-1174  Cognitive Concerns/ Orientation : A&Ox4, calm and cooperative  BEHAVIOR & AGGRESSION TOOL COLOR: Green PRN Seroquel given x1.  CIWA SCORE: N/A  ABNL VS/O2: VSS on RA  MOBILITY: Independent, ambulates in hallways often  PAIN MANAGMENT: PRN Robaxin for lower back pain.  DIET: 2gm Na + diet, 1500 FR.   BOWEL/BLADDER: Up to BR, continent B&B  ABNL LAB/BG: Na 127, Creat 1.96 MD and Neph aware  DRAIN/DEVICES: No IV acces  TELEMETRY RHYTHM: N/A  SKIN: Bruised, edema to LLE, abrasion to R wrist, paracentesis site dressing CDI   TESTS/PROCEDURES:   D/C DAY/GOALS/PLACE: [facility] tomorrow morning 7/2  OTHER IMPORTANT INFO:

## 2020-07-01 NOTE — PROVIDER NOTIFICATION
MD Notification    Notified Person: MD Frankel    Notification Date/Time: 7/1/20 1128    Notification Interaction: Telephone    Purpose of Notification: Na 127    Orders Received:    Comments:

## 2020-07-02 VITALS
SYSTOLIC BLOOD PRESSURE: 129 MMHG | BODY MASS INDEX: 27.33 KG/M2 | RESPIRATION RATE: 18 BRPM | HEART RATE: 70 BPM | OXYGEN SATURATION: 91 % | HEIGHT: 67 IN | DIASTOLIC BLOOD PRESSURE: 72 MMHG | WEIGHT: 174.1 LBS | TEMPERATURE: 98.5 F

## 2020-07-02 PROCEDURE — 25000128 H RX IP 250 OP 636: Performed by: HOSPITALIST

## 2020-07-02 PROCEDURE — 25000132 ZZH RX MED GY IP 250 OP 250 PS 637: Mod: GY | Performed by: PSYCHIATRY & NEUROLOGY

## 2020-07-02 PROCEDURE — 25000132 ZZH RX MED GY IP 250 OP 250 PS 637: Mod: GY | Performed by: HOSPITALIST

## 2020-07-02 PROCEDURE — 25000132 ZZH RX MED GY IP 250 OP 250 PS 637: Mod: GY | Performed by: INTERNAL MEDICINE

## 2020-07-02 PROCEDURE — 99239 HOSP IP/OBS DSCHRG MGMT >30: CPT | Performed by: HOSPITALIST

## 2020-07-02 RX ADMIN — QUETIAPINE FUMARATE 100 MG: 50 TABLET ORAL at 03:38

## 2020-07-02 RX ADMIN — ONDANSETRON 4 MG: 4 TABLET, ORALLY DISINTEGRATING ORAL at 03:38

## 2020-07-02 RX ADMIN — FOLIC ACID 1 MG: 1 TABLET ORAL at 07:55

## 2020-07-02 RX ADMIN — POTASSIUM CHLORIDE 20 MEQ: 1.5 POWDER, FOR SOLUTION ORAL at 07:55

## 2020-07-02 RX ADMIN — FUROSEMIDE 20 MG: 20 TABLET ORAL at 07:54

## 2020-07-02 RX ADMIN — MULTIPLE VITAMINS W/ MINERALS TAB 1 TABLET: TAB at 07:56

## 2020-07-02 RX ADMIN — PANTOPRAZOLE SODIUM 40 MG: 40 TABLET, DELAYED RELEASE ORAL at 06:51

## 2020-07-02 RX ADMIN — SPIRONOLACTONE 25 MG: 25 TABLET ORAL at 07:56

## 2020-07-02 RX ADMIN — FLUTICASONE FUROATE AND VILANTEROL TRIFENATATE 1 PUFF: 200; 25 POWDER RESPIRATORY (INHALATION) at 07:58

## 2020-07-02 RX ADMIN — DOCUSATE SODIUM AND SENNOSIDES 2 TABLET: 8.6; 5 TABLET, FILM COATED ORAL at 07:55

## 2020-07-02 RX ADMIN — HYDROXYZINE HYDROCHLORIDE 100 MG: 50 TABLET, FILM COATED ORAL at 07:55

## 2020-07-02 RX ADMIN — Medication 400 MG: at 07:56

## 2020-07-02 RX ADMIN — VORTIOXETINE 10 MG: 10 TABLET, FILM COATED ORAL at 07:55

## 2020-07-02 ASSESSMENT — ACTIVITIES OF DAILY LIVING (ADL)
ADLS_ACUITY_SCORE: 11

## 2020-07-02 ASSESSMENT — MIFFLIN-ST. JEOR: SCORE: 1528.34

## 2020-07-02 NOTE — PLAN OF CARE
DATE & TIME: 07/2/20 5845-9098  Cognitive Concerns/ Orientation : A&Ox4  BEHAVIOR & AGGRESSION TOOL COLOR: Green   CIWA SCORE: N/A  ABNL VS/O2: VSS on RA  MOBILITY: Independent, ambulates in hallways often  PAIN MANAGMENT: Chronic back pain, PRN  Robaxin available  DIET: 2gm Na + diet, 1500 FR  BOWEL/BLADDER: Up to BR, continent B&B  ABNL LAB/BG: None new  DRAIN/DEVICES: No IV acces  TELEMETRY RHYTHM: N/A  SKIN: Bruised, edema to LLE, abrasion to R wrist, paracentesis site dressing CDI   TESTS/PROCEDURES:   D/C DAY/GOALS/PLACE: [facility] today at 0930; [facility] to transport  OTHER IMPORTANT INFO: PRN Seroquel given x1, baseline hand tremors. SW will work on discharge meds this morning due to some issues with coverage.

## 2020-07-02 NOTE — PROGRESS NOTES
Discharge    Patient discharged to [facility] group home Care plan note: Patient alert/orient X4, up independently in room, lungs clear on RA, vss. Tolerating diet, no complaints of pain. Discharging this am.    Listed belongings gathered and returned to patient. Yes  Care Plan and Patient education resolved: Yes  Prescriptions if needed, hard copies sent with patient  NA  Home and hospital acquired medications returned to patient: NA  Medication Bin checked and emptied on discharge Yes  Follow up appointment made for patient: Yes

## 2020-07-02 NOTE — PROGRESS NOTES
SW:  D:  Patient discharged as planned today via [facility] medivan and transported to [facility].  His medications were filled by [facility] in [facility] which is a provider under his insurance.  Discussed with patient prior to discharge options of how he can obtain his medications.  Left a message for the [facility] RN.  The RN, Elvia, spoke with the CT RN, Renetta and shared they were able to have the prescriptions filled by a [facility] next their program and the program staff will  the medications.

## 2020-07-02 NOTE — DISCHARGE SUMMARY
[facility]  Hospitalist Discharge Summary      Date of Admission:  6/10/2020  Date of Discharge:  7/2/2020  Discharging Provider: Jude Ledesma MD      Discharge Diagnoses   1. Alcoholic cirrhosis with ascites requiring paracentesis periodically  2.  Alcohol abuse/dependence  3.  Acute on chronic kidney disease, stage III  4.  Hypokalemia-normalized  5.  History of anxiety and depression  6.  History of esophagitis and esophageal varices  7.  Chronic anemia and thrombocytopenia  8.  Left lower extremity edema with history of arterial bypass  9.  BPH  10.  Chronic non-oxygen dependent COPD  11.  Chronic left hip and back pain    Follow-ups Needed After Discharge   Follow-up Appointments     Follow-up and recommended labs and tests       Appointment with DR Rand on 7/10 at 1:15pm at [facility]   Psychiatry         Follow-up and recommended labs and tests       PCP and Psychiatry as scheduled         Follow-up and recommended labs and tests       Appointment with Talon Valencia 7/9 at 1030am. Please wear a   mask. If you do not have,one will be provided for you.  BMP and magnesium check 7/7 or 7/8             Unresulted Labs Ordered in the Past 30 Days of this Admission     No orders found from 5/11/2020 to 6/11/2020.      These results will be followed up by NA    Discharge Disposition   Discharged to [facility] facility   Condition at discharge: Stable      Hospital Course   Jim Richard is a 65 year old male with PMH significant for alcohol abuse, alcoholic cirrhosis with ascites, JANIS, depression and anxiety, history of G.I. bleed with esophageal varices who presented to ER with alcohol intoxication and suicidal ideation. While in ED he also reported some dark stools and was admitted for further evaluation of G.I. bleed     Alcoholic cirrhosis with ascites, needing repeated paracentesis  Alcohol abuse/dependence  - Blood alcohol level on admission 0.32. CIWA monitored, no  withdrawal. Clonidine discontinued.  - PTA Lasix and Aldactone initially held on admission given worsening renal function  - Lasix resumed 6/26. Continue to hold spironolactone, defer to nephrology if this can restarted   - S/p paracentesis 6/10 w/ 4L off and 6/15 w/ 4L off  And 6/25 with 3.3 L off   - s/p paracentesis 6/29 w/ 3.1 L taken off.   - 6/30 - 7/1 feeling improved  - as below, Nephrology adjusting diuretics and will discharge on 2gm sodium diet with 1500ml fluid restriction.  - scheduled outpatient paracentesis in about 1 week     Acute on CKD, stage III  Hypokalemia - normalized  - baseline creatinine around 1.3 - 1.5. Creatinine 1.7 on adm. Likely prerenal secondary to alcohol abuse and poor PO intake  - diuretics adjustment as noted above, aldactone still on hold  - Renal US (6/12) unrevealing except for some evidence of chronic urinary bladder outlet obstruction. Received several doses of IV albumin.   - Cr stable 1.8-1.9, improving from peak 2.12, resumed lasix 6/26. Monitor BMP   - nephrology following;  diuretics adjustment per nephrology  - K 3.3 on 6/26 --- corrected, likely sec to diuresis  - 6/29 nephro resumed spironolactone, stopped KCl supplement  - 6/30 Na 130, K 4.0, creat 1.92  - 7/1 Na 127, K 3.5, creat 1.96  -nephrology decreased Lasix to 20 daily, potassium and magnesium supplements daily. 2gm sodium diet, 1500ml fluid restriction  - Planning discharge for 7/2 a.m.-paracentesis planned for 7/7-appreciate CT/SW assistance     Medication noncompliance  Had stopped taking all his medications recently PTA.     Anxiety and depression  Evaluated by psych and restarted on PTA Remeron, Seroquel, trazodone, Trintellix which he has been non-compliant with; were resumed per psychiatry recs  - Patient continued to feel his anxiety was not controlled.  Psychiatry was reconsulted, Seroquel and Atarax increased 6/18. Will continue prn seroquel at discharge. Atarax continued.     Suspected GI bleed  with history of esophagitis and esophageal varices  Chronic anemia and thrombocytopenia  Hemodynamically stable. No further melena. Baseline hemoglobin 8-9; stabilized around 8-9. [facility] consulted. EGD 6/11 noted again with grade 1 esophageal varices, moderate portal gastropathy. Received SBP prophylactic ceftriaxone, discontinued 6/13. Does have s/o iron deficiency; got IV iron per nephrology.  - continue Protonix PO bid; hgb stable.     Left lower extremity swelling  Hx PAD bypass same leg. No Swelling, redness, pain    - U/s ruled out dvt.     BPH  Flomax continued.     Chronic non-O2 dpnt COPD  PTA inhalers     Breast tenderness  Gynecomastia related to his cirrhosis.      Lt hip pain  Back pain  Appears to have greater trochanteric bursitis. Avoid nsaids given liver disease. Cold pack, avoid pressure to the area.   - will have prn robaxin for back pain      Consultations This Hospital Stay   GASTROENTEROLOGY IP CONSULT  SOCIAL WORK IP CONSULT  PSYCHIATRY IP CONSULT  CHEMICAL DEPENDENCY IP CONSULT  NEPHROLOGY IP CONSULT  PHYSICAL THERAPY ADULT IP CONSULT  PSYCHIATRY IP CONSULT    Code Status   Full Code    Time Spent on this Encounter   I, Jude Ledesma MD, personally saw the patient today and spent greater than 30 minutes discharging this patient.       Jude Ledesma MD  [facility]  ______________________________________________________________________    Physical Exam   Vital Signs: Temp: 98.5  F (36.9  C) Temp src: Oral BP: 129/72 Pulse: 70 Heart Rate: 66 Resp: 18 SpO2: 91 % O2 Device: None (Room air)    Weight: 174 lbs 1.6 oz    Gen: NAD, pleasant  HEENT: Normocephalic, EOMI, MMM  Resp: no crackles,  no wheezes, no increased work of resp  CV: S1S2 heard, reg rhythm, reg rate  Abdo: soft, nontender, nondistended, bowel sounds present  Ext: calves nontender, well perfused  Neuro: AAOx3, CN grossly intact, no facial asymmetry         Primary Care Physician   FERNANDA  KARON    Discharge Orders      US Paracentesis     Follow-up and recommended labs and tests     Appointment with DR Rand on 7/10 at 1:15pm at [facility]     Reason for your hospital stay    Alcohol intoxication     Follow-up and recommended labs and tests     PCP and Psychiatry as scheduled     Activity    Your activity upon discharge: activity as tolerated     Discharge Instructions    Continue ongoing therapy at [facility].  Follow up with your regular doctor in about 1 week and psychiatry as well.  You will have repeat labs prior to your doctor visit.     Follow-up and recommended labs and tests     Appointment with Talon Valencia 7/9 at 1030am. Please wear a mask. If you do not have,one will be provided for you.  BMP and magnesium check 7/7 or 7/8     Full Code     Diet    Follow this diet upon discharge: Orders Placed This Encounter      Fluid restriction 1500 ML FLUID      2 Gram Sodium Diet       Significant Results and Procedures   Most Recent 3 CBC's:  Recent Labs   Lab Test 06/27/20  0539 06/24/20  0630 06/23/20  0705   WBC 4.0 4.2 3.8*   HGB 9.1* 9.0* 8.8*   MCV 90 92 92    167 162     Most Recent 3 BMP's:  Recent Labs   Lab Test 07/01/20  0734 06/30/20  1008 06/29/20  0721   * 130* 130*   POTASSIUM 3.5 4.0 3.5   CHLORIDE 95 99 99   CO2 25 25 24   BUN 29 29 30   CR 1.96* 1.92* 1.88*   ANIONGAP 7 6 7   KEV 8.7 8.6 8.6   GLC 96 125* 99     Most Recent 2 LFT's:  Recent Labs   Lab Test 06/18/20  0629 06/10/20  0206   AST 47* 89*   ALT 40 30   ALKPHOS 207* 239*   BILITOTAL 0.6 1.0     Most Recent 3 INR's:  Recent Labs   Lab Test 06/10/20  0206 05/26/20  1215 05/14/20  2322   INR 1.22* 1.30* 1.23*   ,   Results for orders placed or performed during the hospital encounter of 06/10/20   US Paracentesis    Narrative    ULTRASOUND PARACENTESIS  6/10/2020 12:14 PM     HISTORY: High volume paracentesis with or without diagnostic fluid  analysis with labs to be drawn if  ordered. Total paracentesis volume  as much as possible.    FINDINGS: Ultrasound was used to evaluate for the presence and best  approach for paracentesis. Written and oral informed consent was  obtained. A pause for the cause procedure to verify the correct  patient and correct procedure. The skin overlying the right lower  quadrant was prepped and draped in the usual sterile fashion. The  subcutaneous tissues were anesthetized with 10 mL subcutaneous 1%  lidocaine. A catheter was advanced into the peritoneal space and 4 L  of  straw colored fluid was drained. There were no immediate  complications. Ultrasound images were permanently stored.  Patient  left the ultrasound suite in satisfactory condition.      Impression    IMPRESSION: Technically successful paracentesis without immediate  complications.    TACO LINDA MD   XR Chest Port 1 View    Narrative    CHEST PORTABLE ONE VIEW   6/10/2020 4:16 PM     HISTORY: Shortness of breath.    COMPARISON: Chest x-ray on 5/26/2020.      Impression    IMPRESSION: Single portable AP view of the chest was obtained. Stable  cardiomediastinal silhouette. No suspicious focal pulmonary opacities.  No significant pleural effusion or pneumothorax. Bilateral old rib  fractures.    CRUZ GILMAN MD   US Renal Complete    Narrative    US RENAL COMPLETE 6/12/2020 12:17 PM    CLINICAL HISTORY: ARF/CKD; evaluate size/symmetry.  ro obstruction  TECHNIQUE: Routine Bilateral Renal and Bladder Ultrasound.    COMPARISON: None.    FINDINGS:    RIGHT KIDNEY: 12.1 x 5.7 x 6.3 cm. Increased cortical echotexture with  preserved thickness. Grossly normal flow by color Doppler. No  hydronephrosis.     LEFT KIDNEY: 12.9 x 6.2 x 5.6 cm. Similar to the contralateral kidney.  No hydronephrosis. In the upper pole is a 1.7 x 1.4 x 1.8 cm anechoic  structure.    BLADDER: Bladder prevoid volume is 210 mL. Wall appears irregular with  internal debris. Bilateral normal ureteral jets identified.       Impression    IMPRESSION:  1.  Very slight enlargement of left kidney compared to the right.  There is evidence of renal parenchymal disease. No hydronephrosis.    2.  There is a 1.8 cm simple left renal cyst, not requiring additional  follow-up imaging.    3.  There is some debris in the urinary bladder, and evidence of  chronic urinary bladder outlet obstruction.    BABAR VAZQUEZ MD   US Paracentesis    Narrative    US PARACENTESIS 6/15/2020 2:56 PM    CLINICAL HISTORY: High volume paracentesis with or without diagnostic  fluid analysis with labs to be drawn if ordered    PROCEDURE: Informed consent obtained. Time out performed. The abdomen  was prepped and draped in a sterile fashion. 10 mL of 1% lidocaine was  infused into local soft tissues. A 5 Georgian catheter system was  introduced into the abdominal ascites under ultrasound guidance.    4.3 liters of clear fluid were removed and sent to lab if requested.    Patient tolerated procedure well.    Ultrasound imaging was obtained and placed in the patient's permanent  medical record.      Impression    IMPRESSION:  1.  Status post ultrasound-guided paracentesis.    ANIL KERN MD   US Lower Extremity Venous Duplex Left    Narrative    VENOUS ULTRASOUND LEFT LEG  6/23/2020 10:47 AM     HISTORY: Left lower leg redness, swelling, pain    COMPARISON: None.    FINDINGS:  Examination of the deep veins with graded compression and  color flow Doppler with spectral wave form analysis was performed.   There is no evidence for DVT in the left lower extremity.      Impression    IMPRESSION: No evidence of deep venous thrombosis.    GARLAND PEACOCK MD   US Paracentesis    Narrative    US PARACENTESIS 6/25/2020 1:52 PM    CLINICAL HISTORY: High volume paracentesis with or without diagnostic  fluid analysis with labs to be drawn if ordered    PROCEDURE: Informed consent obtained. Time out performed. The abdomen  was prepped and draped in a sterile fashion. 10 mL of  1% lidocaine was  infused into local soft tissues. A 5 [place] Islander catheter system was  introduced into the abdominal ascites under ultrasound guidance.    3.3 liters of clear fluid were removed and sent to lab if requested.    Patient tolerated procedure well.    Ultrasound imaging was obtained and placed in the patient's permanent  medical record.      Impression    IMPRESSION:  1.  Status post ultrasound-guided paracentesis.    ANIL KERN MD   US Paracentesis initial: high volume    Narrative    US PARACENTESIS 6/29/2020 2:53 PM    CLINICAL HISTORY: Ascites. THERAPEUTIC (high volume) Paracentesis with  fluid analysis    PROCEDURE: Informed written and verbal consent obtained. Time out  performed. The abdomen was prepped and draped in a sterile fashion. 10  mL of 1% lidocaine was infused into local soft tissues. A 8 [place] Islander  catheter system was introduced into the abdominal ascites under  ultrasound guidance.    3.1 liters of clear, straw-colored fluid were removed and sent to lab  if requested.    Patient tolerated procedure well.    Initial imaging demonstrates large ascites. Subsequent imaging  demonstrates catheter placement.      Impression    IMPRESSION:  1.  Status post ultrasound-guided therapeutic paracentesis with  removal 3.1 L of fluid.    BABAR VAZQUEZ MD       Discharge Medications   Current Discharge Medication List      START taking these medications    Details   folic acid (FOLVITE) 1 MG tablet Take 1 tablet (1 mg) by mouth daily  Qty: 30 tablet, Refills: 0    Associated Diagnoses: Alcoholic cirrhosis of liver with ascites (H)      hydrOXYzine (ATARAX) 50 MG tablet Take 2 tablets (100 mg) by mouth 3 times daily  Qty: 180 tablet, Refills: 0    Associated Diagnoses: Pain medication agreement      magnesium oxide (MAG-OX) 400 MG tablet Take 1 tablet (400 mg) by mouth 2 times daily  Qty: 60 tablet, Refills: 0    Associated Diagnoses: Alcoholic cirrhosis of liver with ascites (H)       methocarbamol (ROBAXIN) 500 MG tablet Take 1 tablet (500 mg) by mouth 3 times daily as needed for muscle spasms  Qty: 60 tablet, Refills: 0    Associated Diagnoses: Pain medication agreement      potassium chloride (KLOR-CON) 20 MEQ packet Take 20 mEq by mouth 2 times daily (with meals)  Qty: 60 packet, Refills: 0    Associated Diagnoses: Alcoholic cirrhosis of liver with ascites (H)         CONTINUE these medications which have CHANGED    Details   fluticasone-vilanterol (BREO ELLIPTA) 200-25 MCG/INH inhaler Inhale 1 puff into the lungs daily  Qty: 28 each, Refills: 0    Associated Diagnoses: Chronic obstructive pulmonary disease, unspecified COPD type (H)      furosemide (LASIX) 20 MG tablet Take 1 tablet (20 mg) by mouth daily  Qty: 30 tablet, Refills: 0    Associated Diagnoses: Alcoholic cirrhosis of liver with ascites (H)      mirtazapine (REMERON) 30 MG tablet Take 1 tablet (30 mg) by mouth At Bedtime  Qty: 30 tablet, Refills: 0    Associated Diagnoses: Anxiety and depression      multivitamin w/minerals (THERA-VIT-M) tablet Take 1 tablet by mouth daily  Qty: 30 each, Refills: 0    Associated Diagnoses: Alcoholic intoxication without complication (H)      pantoprazole (PROTONIX) 40 MG EC tablet Take 1 tablet (40 mg) by mouth 2 times daily (before meals)  Qty: 60 tablet, Refills: 0    Associated Diagnoses: Melena      QUEtiapine (SEROQUEL) 50 MG tablet Take 1-2 tablets ( mg) by mouth every 6 hours as needed (Moderate agitation)  Qty: 120 tablet, Refills: 0    Associated Diagnoses: Anxiety and depression      spironolactone (ALDACTONE) 25 MG tablet Take 1 tablet (25 mg) by mouth daily  Qty: 30 tablet, Refills: 0    Associated Diagnoses: Alcoholic cirrhosis of liver with ascites (H)      tamsulosin (FLOMAX) 0.4 MG capsule Take 1 capsule (0.4 mg) by mouth every evening  Qty: 30 capsule, Refills: 0    Associated Diagnoses: Difficulty voiding      traZODone (DESYREL) 100 MG tablet Take 1 tablet (100 mg) by  mouth At Bedtime  Qty: 30 tablet, Refills: 0    Associated Diagnoses: Depression, unspecified depression type      vortioxetine (TRINTELLIX) 10 MG tablet Take 1 tablet (10 mg) by mouth daily  Qty: 30 tablet, Refills: 0    Associated Diagnoses: Severe episode of recurrent major depressive disorder, without psychotic features (H)         CONTINUE these medications which have NOT CHANGED    Details   order for DME Equipment being ordered: Walker Wheels () and Walker ()  Treatment Diagnosis: difficulty walking  Qty: 1 each, Refills: 0    Associated Diagnoses: Spinal stenosis, lumbar region, with neurogenic claudication           Allergies   Allergies   Allergen Reactions     Amlodipine Swelling     Lisinopril      Other reaction(s): Angioedema  Mouth and tongue swelling   Mouth and tongue swelling

## 2020-07-02 NOTE — PLAN OF CARE
DATE & TIME: 07/1/20 8229-2872  Cognitive Concerns/ Orientation : A&Ox4, anxious at times, cooperative  BEHAVIOR & AGGRESSION TOOL COLOR: Green PRN Seroquel given x2  CIWA SCORE: N/A  ABNL VS/O2: VSS on RA  MOBILITY: Independent, ambulates in hallways often  PAIN MANAGMENT: PRN Robaxin x1 for lower back pain.  DIET: 2gm Na + diet, 1500 FR, good po   BOWEL/BLADDER: Up to BR, continent B&B  ABNL LAB/BG: Na 127, Creat 1.96 DRAIN/DEVICES: No IV acces  TELEMETRY RHYTHM: N/A  SKIN: Bruised, edema to LLE, abrasion to R wrist, paracentesis site dressing CDI   TESTS/PROCEDURES:   D/C DAY/GOALS/PLACE: [facility] tomorrow morning 7/2 at 1000  OTHER IMPORTANT INFO:SW will work on discharge meds in am due to some issues with coverage.

## 2020-07-03 ENCOUNTER — TELEPHONE (OUTPATIENT)
Dept: MEDSURG UNIT | Facility: CLINIC | Age: 65
End: 2020-07-03

## 2020-07-03 NOTE — TELEPHONE ENCOUNTER
Pre-Procedure Unconfirmed COVID Test     Step 1 COVID Screening  The patient was screened for COVID symptoms due to the inability to confirm the patient's COVID status (positive or negative test)    Patient reports the following:  Fever/Chills? No   Cough? No   Shortness of breath? No   New loss of taste or smell? No  Sore throat? No  Muscle or body aches? No  Headaches? No  Fatigue? No  Vomiting or diarrhea? No    Patient informed to contact the ordering provider if any of the symptoms develop prior to the procedure    Step 2 Screening Results (Skip if the patient is negative for symptoms)  If the patient is positive for new or worsening symptoms, contact the ordering provider to determine if the procedure is deemed necessary    Determine if the procedure can be re-scheduled when the patient is symptom free or has a negative COVID test     Step 3 Review Visitor Policy  Patient informed of the updated visitor policy   1 visitor allowed per patient   Visitor must screen negative for COVID symptoms   Visitor must wear a mask   Waiting rooms continue to be closed to visitors    Brad Mccullough RN

## 2020-07-06 ENCOUNTER — HOSPITAL ENCOUNTER (OUTPATIENT)
Facility: CLINIC | Age: 65
Discharge: HOME | End: 2020-07-06
Admitting: PHYSICIAN ASSISTANT
Payer: MEDICARE

## 2020-07-06 ENCOUNTER — HOSPITAL ENCOUNTER (OUTPATIENT)
Dept: ULTRASOUND IMAGING | Facility: CLINIC | Age: 65
End: 2020-07-06
Attending: HOSPITALIST
Payer: MEDICARE

## 2020-07-06 VITALS
HEIGHT: 67 IN | WEIGHT: 180 LBS | DIASTOLIC BLOOD PRESSURE: 70 MMHG | RESPIRATION RATE: 16 BRPM | BODY MASS INDEX: 28.25 KG/M2 | TEMPERATURE: 99 F | OXYGEN SATURATION: 99 % | SYSTOLIC BLOOD PRESSURE: 147 MMHG

## 2020-07-06 DIAGNOSIS — K70.31 ALCOHOLIC CIRRHOSIS OF LIVER WITH ASCITES (H): ICD-10-CM

## 2020-07-06 PROCEDURE — 40000863 ZZH STATISTIC RADIOLOGY XRAY, US, CT, MAR, NM

## 2020-07-06 PROCEDURE — 49083 ABD PARACENTESIS W/IMAGING: CPT

## 2020-07-06 RX ORDER — DEXTROSE MONOHYDRATE 25 G/50ML
25-50 INJECTION, SOLUTION INTRAVENOUS
Status: DISCONTINUED | OUTPATIENT
Start: 2020-07-06 | End: 2020-07-06

## 2020-07-06 RX ORDER — DEXTROSE MONOHYDRATE 25 G/50ML
25-50 INJECTION, SOLUTION INTRAVENOUS
Status: DISCONTINUED | OUTPATIENT
Start: 2020-07-06 | End: 2020-07-06 | Stop reason: HOSPADM

## 2020-07-06 RX ORDER — NICOTINE POLACRILEX 4 MG
15-30 LOZENGE BUCCAL
Status: CANCELLED | OUTPATIENT
Start: 2020-07-06

## 2020-07-06 RX ORDER — DEXTROSE MONOHYDRATE 25 G/50ML
25-50 INJECTION, SOLUTION INTRAVENOUS
Status: CANCELLED | OUTPATIENT
Start: 2020-07-06

## 2020-07-06 RX ORDER — LIDOCAINE 40 MG/G
CREAM TOPICAL
Status: CANCELLED | OUTPATIENT
Start: 2020-07-06

## 2020-07-06 RX ORDER — NICOTINE POLACRILEX 4 MG
15-30 LOZENGE BUCCAL
Status: DISCONTINUED | OUTPATIENT
Start: 2020-07-06 | End: 2020-07-06 | Stop reason: HOSPADM

## 2020-07-06 RX ORDER — ALBUMIN (HUMAN) 12.5 G/50ML
12.5-5 SOLUTION INTRAVENOUS ONCE
Status: DISCONTINUED | OUTPATIENT
Start: 2020-07-06 | End: 2020-07-06 | Stop reason: HOSPADM

## 2020-07-06 RX ORDER — ALBUMIN (HUMAN) 12.5 G/50ML
12.5 SOLUTION INTRAVENOUS ONCE
Status: CANCELLED | OUTPATIENT
Start: 2020-07-06 | End: 2020-07-06

## 2020-07-06 RX ORDER — ALBUMIN (HUMAN) 12.5 G/50ML
12.5 SOLUTION INTRAVENOUS ONCE
Status: DISCONTINUED | OUTPATIENT
Start: 2020-07-06 | End: 2020-07-06 | Stop reason: HOSPADM

## 2020-07-06 RX ORDER — LIDOCAINE 40 MG/G
CREAM TOPICAL
Status: DISCONTINUED | OUTPATIENT
Start: 2020-07-06 | End: 2020-07-06 | Stop reason: HOSPADM

## 2020-07-06 RX ORDER — NICOTINE POLACRILEX 4 MG
15-30 LOZENGE BUCCAL
Status: DISCONTINUED | OUTPATIENT
Start: 2020-07-06 | End: 2020-07-06

## 2020-07-06 RX ORDER — LIDOCAINE HYDROCHLORIDE 10 MG/ML
10 INJECTION, SOLUTION EPIDURAL; INFILTRATION; INTRACAUDAL; PERINEURAL ONCE
Status: COMPLETED | OUTPATIENT
Start: 2020-07-06 | End: 2020-07-06

## 2020-07-06 RX ADMIN — LIDOCAINE HYDROCHLORIDE 10 ML: 10 INJECTION, SOLUTION EPIDURAL; INFILTRATION; INTRACAUDAL; PERINEURAL at 14:35

## 2020-07-06 ASSESSMENT — MIFFLIN-ST. JEOR: SCORE: 1555.1

## 2020-07-06 NOTE — PROGRESS NOTES
Patient Information  Name: Jim Richard  Age: 65 year old    Paracentesis:     Pt tolerated well. VSS. 2,100 cc yellow fluid removed from abdomen w/o difficulty. Bandaid applied to site - CDI.     Pt back to [facility]. VSS.  Tolerating fluids.  Will discharge in approximately 20 minutes.      Care [facility]es Discharge Nursing Note    Discharge Education:  Discharge instructions reviewed: Yes  Patient/patient representative verbalizes understanding: Yes  Patient discharging on new medications: No  Medication education completed: Yes    Discharge Plans:   Discharge location: home  Discharge ride contacted: Yes  Approximate discharge time: 15:08    Discharge Criteria:  Discharge criteria met and vital signs stable: Yes    Patient Belongs:  Patient belongings returned to patient: Yes    Dedra Christian RN

## 2020-07-06 NOTE — DISCHARGE INSTRUCTIONS

## 2020-07-06 NOTE — PROGRESS NOTES
RADIOLOGY PROCEDURE NOTE  Patient name: Jim Richard  MRN: [id number]  : 1955    Pre-procedure diagnosis: Ascites  Post-procedure diagnosis: Same    Procedure Date/Time: 2020  2:38 PM  Procedure: Paracentesis  Estimated blood loss: None  Specimen(s) collected with description: ascitic fluid  The patient tolerated the procedure well with no immediate complications.  Significant findings:none    See imaging dictation for procedural details.    Provider name: Sharan Alfred PA-C  Assistant(s):None

## 2020-07-06 NOTE — PROGRESS NOTES
Care Suites Admission Nursing Note    Patient Information  Name: Jim Richard  Age: 65 year old  Reason for admission: paracentesis  Care Suites arrival time: 1315    Patient Admission/Assessment   Pre-procedure assessment complete: Yes  If abnormal assessment/labs, provider notified: N/A  NPO: N/A  Medications held per instructions/orders: N/A  Consent: deferred  If applicable, pregnancy test status: deferred  Patient oriented to room: Yes  Education/questions answered: Yes  Plan/other: proceed as planned discharge instructions reviewed    Discharge Planning  Accompanied by: self  Discharge name/phone number:  --  Overnight post sedation caregiver: na  Discharge location: home    Nic Carpenter RN

## 2020-07-06 NOTE — PROGRESS NOTES
PATIENT/VISITOR WELLNESS SCREENING    Step 1 Patient Screening    1. In the last month, have you been in contact with someone who was confirmed or suspected to have Coronavirus/COVID-19? No    2. Do you have the following symptoms?  Fever/Chills? No   Cough? No   Shortness of breath? No   New loss of taste or smell? No  Sore throat? No  Muscle or body aches? No  Headaches? No  Fatigue? No  Vomiting or diarrhea? No    Step 2 Visitor Screening    1. Name of Visitor (1 visitor per patient): na      2. In the last month, have you been in contact with someone who was confirmed or suspected to have Coronavirus/COVID-19?     3. Do you have the following symptoms?  Fever/Chills?    Cough?    Shortness of breath?    Skin rash?    Loss of taste or smell?   Sore throat?   Runny or stuffy nose?   Muscle or body aches?   Headaches?   Fatigue?   Vomiting or diarrhea?     If the visitor has positive symptoms, notify supervisor/manger  Per policy, the visitor will need to leave the facility     Step 3 Refer to logic grid below for actions    NO SYMPTOM(S)    ACTIONS:  1. Standard rooming process  2. Provider to assess per normal protocol  3. Implement precautions as needed and per guidelines     POSITIVE SYMPTOM(S)  If positive for ANY of the following symptoms: fever, cough, shortness of breath, rash    ACTION:  1. Continue to have the patient wear a mask   2. Room patient as soon as possible  3. Don appropriate PPE when entering room  4. Provider evaluation

## 2020-07-14 DIAGNOSIS — Z11.59 ENCOUNTER FOR SCREENING FOR OTHER VIRAL DISEASES: Primary | ICD-10-CM

## 2020-07-17 DIAGNOSIS — Z11.59 ENCOUNTER FOR SCREENING FOR OTHER VIRAL DISEASES: ICD-10-CM

## 2020-07-17 PROCEDURE — U0003 INFECTIOUS AGENT DETECTION BY NUCLEIC ACID (DNA OR RNA); SEVERE ACUTE RESPIRATORY SYNDROME CORONAVIRUS 2 (SARS-COV-2) (CORONAVIRUS DISEASE [COVID-19]), AMPLIFIED PROBE TECHNIQUE, MAKING USE OF HIGH THROUGHPUT TECHNOLOGIES AS DESCRIBED BY CMS-2020-01-R: HCPCS | Performed by: EMERGENCY MEDICINE

## 2020-07-18 LAB
SARS-COV-2 RNA SPEC QL NAA+PROBE: NOT DETECTED
SPECIMEN SOURCE: NORMAL

## 2020-07-20 ENCOUNTER — HOSPITAL ENCOUNTER (OUTPATIENT)
Dept: ULTRASOUND IMAGING | Facility: CLINIC | Age: 65
Discharge: HOME | End: 2020-07-20
Attending: EMERGENCY MEDICINE | Admitting: EMERGENCY MEDICINE
Payer: MEDICARE

## 2020-07-20 VITALS — RESPIRATION RATE: 16 BRPM | DIASTOLIC BLOOD PRESSURE: 66 MMHG | SYSTOLIC BLOOD PRESSURE: 145 MMHG

## 2020-07-20 DIAGNOSIS — R18.8 OTHER ASCITES: ICD-10-CM

## 2020-07-20 PROCEDURE — 27210190 US PARACENTESIS

## 2020-07-20 PROCEDURE — 25000125 ZZHC RX 250: Performed by: RADIOLOGY

## 2020-07-20 RX ADMIN — LIDOCAINE HYDROCHLORIDE 10 ML: 10 INJECTION, SOLUTION EPIDURAL; INFILTRATION; INTRACAUDAL; PERINEURAL at 14:54

## 2020-07-20 NOTE — PROGRESS NOTES
Paracentesis complete.  Consent obtained with Dr Mcfadden.  Pt tolerated well, drained 2800 mLs clear colored fluid.  Site at RLQ.  Site covered with a sterile bandaid.  Site CDI.  Reviewed discharge instructions with pt.  Pt stable on discharge.

## 2020-07-24 ENCOUNTER — HOSPITAL ENCOUNTER (OUTPATIENT)
Facility: CLINIC | Age: 65
End: 2020-07-24
Payer: MEDICARE

## 2020-07-24 DIAGNOSIS — Z11.59 ENCOUNTER FOR SCREENING FOR OTHER VIRAL DISEASES: Primary | ICD-10-CM

## 2020-07-30 ENCOUNTER — HOSPITAL ENCOUNTER (OUTPATIENT)
Dept: ULTRASOUND IMAGING | Facility: CLINIC | Age: 65
End: 2020-07-30
Attending: FAMILY MEDICINE
Payer: MEDICARE

## 2020-07-30 ENCOUNTER — HOSPITAL ENCOUNTER (OUTPATIENT)
Facility: CLINIC | Age: 65
Discharge: HOME | End: 2020-07-30
Admitting: RADIOLOGY
Payer: MEDICARE

## 2020-07-30 VITALS
HEIGHT: 67 IN | SYSTOLIC BLOOD PRESSURE: 164 MMHG | TEMPERATURE: 96.7 F | WEIGHT: 175 LBS | BODY MASS INDEX: 27.47 KG/M2 | RESPIRATION RATE: 16 BRPM | HEART RATE: 56 BPM | DIASTOLIC BLOOD PRESSURE: 68 MMHG | OXYGEN SATURATION: 97 %

## 2020-07-30 DIAGNOSIS — K70.31 ALCOHOLIC CIRRHOSIS OF LIVER WITH ASCITES (H): ICD-10-CM

## 2020-07-30 PROCEDURE — 27210190 US PARACENTESIS

## 2020-07-30 PROCEDURE — 40000863 ZZH STATISTIC RADIOLOGY XRAY, US, CT, MAR, NM

## 2020-07-30 RX ORDER — LIDOCAINE 40 MG/G
CREAM TOPICAL
Status: DISCONTINUED | OUTPATIENT
Start: 2020-07-30 | End: 2020-07-30 | Stop reason: HOSPADM

## 2020-07-30 RX ORDER — DEXTROSE MONOHYDRATE 25 G/50ML
25-50 INJECTION, SOLUTION INTRAVENOUS
Status: DISCONTINUED | OUTPATIENT
Start: 2020-07-30 | End: 2020-07-30

## 2020-07-30 RX ORDER — NICOTINE POLACRILEX 4 MG
15-30 LOZENGE BUCCAL
Status: DISCONTINUED | OUTPATIENT
Start: 2020-07-30 | End: 2020-07-30

## 2020-07-30 RX ORDER — DEXTROSE MONOHYDRATE 25 G/50ML
25-50 INJECTION, SOLUTION INTRAVENOUS
Status: DISCONTINUED | OUTPATIENT
Start: 2020-07-30 | End: 2020-07-30 | Stop reason: HOSPADM

## 2020-07-30 RX ORDER — ALBUMIN (HUMAN) 12.5 G/50ML
12.5 SOLUTION INTRAVENOUS ONCE
Status: DISCONTINUED | OUTPATIENT
Start: 2020-07-30 | End: 2020-07-30 | Stop reason: HOSPADM

## 2020-07-30 RX ORDER — NICOTINE POLACRILEX 4 MG
15-30 LOZENGE BUCCAL
Status: DISCONTINUED | OUTPATIENT
Start: 2020-07-30 | End: 2020-07-30 | Stop reason: HOSPADM

## 2020-07-30 RX ORDER — LIDOCAINE HYDROCHLORIDE 10 MG/ML
10 INJECTION, SOLUTION EPIDURAL; INFILTRATION; INTRACAUDAL; PERINEURAL ONCE
Status: COMPLETED | OUTPATIENT
Start: 2020-07-30 | End: 2020-07-30

## 2020-07-30 RX ADMIN — LIDOCAINE HYDROCHLORIDE 10 ML: 10 INJECTION, SOLUTION EPIDURAL; INFILTRATION; INTRACAUDAL; PERINEURAL at 10:16

## 2020-07-30 ASSESSMENT — MIFFLIN-ST. JEOR: SCORE: 1532.42

## 2020-07-30 NOTE — PROGRESS NOTES
Care Suites Post Procedure Note    Patient Information  Name: Jim iRchard  Age: 65 year old    Post Procedure  Time patient returned to Care Suites: 1040  Concerns/abnormal assessment: none  If abnormal assessment, provider notified: N/A  Plan/Other: post care as ordered, 3800 ml removed pt minna well, VSS.  Pt minna OJ , up to BR.      Nic Carpenter RN

## 2020-07-30 NOTE — PROGRESS NOTES
Care Suites Admission Nursing Note    Patient Information  Name: Jim Richard  Age: 65 year old  Reason for admission: paracentesis  Care Suites arrival time: 0900    Patient Admission/Assessment   Pre-procedure assessment complete: Yes  If abnormal assessment/labs, provider notified: N/A  NPO: N/A  Medications held per instructions/orders: N/A  Consent: deferred  If applicable, pregnancy test status: deferred  Patient oriented to room: Yes  Education/questions answered: Yes  Plan/other: proceed as planned, discharge instructions reviewed, all questions answered    Discharge Planning  Accompanied by: self  Discharge name/phone number: friend   --  Overnight post sedation caregiver: na    Discharge location: home    Nic Carpenter RN

## 2020-07-30 NOTE — PROCEDURES
[facility]    Procedure: Paracentesis    Date/Time: 7/30/2020 10:23 AM  Performed by: Rajinder Laws MD  Authorized by: Rajinder Laws MD     UNIVERSAL PROTOCOL   Site Marked: Yes  Prior Images Obtained and Reviewed:  Yes  Required items: Required blood products, implants, devices and special equipment available    Patient identity confirmed:  Verbally with patient  Patient was reevaluated immediately before administering moderate or deep sedation or anesthesia  Confirmation Checklist:  Patient's identity using two indicators, relevant allergies, procedure was appropriate and matched the consent or emergent situation and correct equipment/implants were available  Time out: Immediately prior to the procedure a time out was called    Universal Protocol: the Joint Commission Universal Protocol was followed    Preparation: Patient was prepped and draped in usual sterile fashion          SEDATION    Patient Sedated: No    See dictated procedure note for full details.  PROCEDURE   Patient Tolerance:  Patient tolerated the procedure well with no immediate complications    Length of time physician/provider present for 1:1 monitoring during sedation: 0

## 2020-07-30 NOTE — PROGRESS NOTES
PATIENT/VISITOR WELLNESS SCREENING    Step 1 Patient Screening    1. In the last month, have you been in contact with someone who was confirmed or suspected to have Coronavirus/COVID-19? No    2. Do you have the following symptoms?  Fever/Chills? No   Cough? No   Shortness of breath? No   New loss of taste or smell? No  Sore throat? No  Muscle or body aches? No  Headaches? No  Fatigue? No  Vomiting or diarrhea? No    Step 2 Visitor Screening    1. Name of Visitor (1 visitor per patient):       2. In the last month, have you been in contact with someone who was confirmed or suspected to have Coronavirus/COVID-19?     3. Do you have the following symptoms?  Fever/Chills?    Cough?    Shortness of breath?    Skin rash?    Loss of taste or smell?   Sore throat?   Runny or stuffy nose?   Muscle or body aches?   Headaches?   Fatigue?   Vomiting or diarrhea?     If the visitor has positive symptoms, notify supervisor/manger  Per policy, the visitor will need to leave the facility     Step 3 Refer to logic grid below for actions    NO SYMPTOM(S)    ACTIONS:  1. Standard rooming process  2. Provider to assess per normal protocol  3. Implement precautions as needed and per guidelines     POSITIVE SYMPTOM(S)  If positive for ANY of the following symptoms: fever, cough, shortness of breath, rash    ACTION:  1. Continue to have the patient wear a mask   2. Room patient as soon as possible  3. Don appropriate PPE when entering room  4. Provider evaluation

## 2020-07-30 NOTE — PROGRESS NOTES
Care Suites Discharge Nursing Note    Patient Information  Name: Jim Richard  Age: 65 year old    Discharge Education:  Discharge instructions reviewed: Yes  Additional education/resources provided: na  Patient/patient representative verbalizes understanding: Yes  Patient discharging on new medications: N/A  Medication education completed: N/A    Discharge Plans:   Discharge location: home  Discharge ride contacted: Yes  Approximate discharge time: 1100    Discharge Criteria:  Discharge criteria met and vital signs stable: Yes    Patient Belongs:  Patient belongings returned to patient: Yes    Nic Carpenter RN

## 2020-07-30 NOTE — DISCHARGE INSTRUCTIONS

## 2020-07-30 NOTE — PROGRESS NOTES
[facility] Procedure Nursing Note    Patient Information  Name: Jim Richard  Age: 65 year old    Procedure  Procedure: Paracentesis: patient taken to department via cart. Ultrasound tech performed preliminary scan to find pockets of fluid. MD arrived to explain procedure, obtained consent. 1017-Time out was then done. Procedure begun, no issues, drainage in process. Patient was monitored with BP and O2 sats. Patient tolerated well. VSS. 3200 cc yellow fluid removed from abdomen w/o difficulty. Bandaid applied to site.    1036- Pt back to [facility] 11 per cart   Procedure start time: 1012  Procedure complete time: 1036  Concerns/abnormal assessment: No  If abnormal assessment, provider notified: N/A  Plan/Other: Return to Care Suites for recovery then discharge to home    Alis Ramos RN

## 2020-08-05 RX ORDER — DEXTROSE MONOHYDRATE 25 G/50ML
25-50 INJECTION, SOLUTION INTRAVENOUS
Status: CANCELLED | OUTPATIENT
Start: 2020-08-05

## 2020-08-05 RX ORDER — NICOTINE POLACRILEX 4 MG
15-30 LOZENGE BUCCAL
Status: CANCELLED | OUTPATIENT
Start: 2020-08-05

## 2020-08-05 RX ORDER — ALBUMIN (HUMAN) 12.5 G/50ML
12.5 SOLUTION INTRAVENOUS ONCE
Status: CANCELLED | OUTPATIENT
Start: 2020-08-05 | End: 2020-08-05

## 2020-08-05 RX ORDER — LIDOCAINE 40 MG/G
CREAM TOPICAL
Status: CANCELLED | OUTPATIENT
Start: 2020-08-05

## 2020-08-06 DIAGNOSIS — Z11.59 ENCOUNTER FOR SCREENING FOR OTHER VIRAL DISEASES: ICD-10-CM

## 2020-08-06 PROCEDURE — U0003 INFECTIOUS AGENT DETECTION BY NUCLEIC ACID (DNA OR RNA); SEVERE ACUTE RESPIRATORY SYNDROME CORONAVIRUS 2 (SARS-COV-2) (CORONAVIRUS DISEASE [COVID-19]), AMPLIFIED PROBE TECHNIQUE, MAKING USE OF HIGH THROUGHPUT TECHNOLOGIES AS DESCRIBED BY CMS-2020-01-R: HCPCS | Performed by: FAMILY MEDICINE

## 2020-08-07 ENCOUNTER — TELEPHONE (OUTPATIENT)
Dept: MEDSURG UNIT | Facility: CLINIC | Age: 65
End: 2020-08-07

## 2020-08-07 LAB
SARS-COV-2 RNA SPEC QL NAA+PROBE: NOT DETECTED
SPECIMEN SOURCE: NORMAL

## 2020-08-07 NOTE — TELEPHONE ENCOUNTER
Pre-Procedure Unconfirmed COVID Test     Pt stated he was exposed 2 weeks ago with someone who was positive for COVID.  Pt states he has been asymptomatic.  COVID test done and labs are pending.      Step 1 COVID Screening  The patient was screened for COVID symptoms due to the inability to confirm the patient's COVID status (positive or negative test)    Patient reports the following:  Fever/Chills? No   Cough? No   Shortness of breath? No   New loss of taste or smell? No  Sore throat? No  Muscle or body aches? No  Headaches? No  Fatigue? No  Vomiting or diarrhea? No    Patient informed to contact the ordering provider if any of the symptoms develop prior to the procedure    Step 2 Screening Results (Skip if the patient is negative for symptoms)  If the patient is positive for new or worsening symptoms, contact the ordering provider to determine if the procedure is deemed necessary    Determine if the procedure can be re-scheduled when the patient is symptom free or has a negative COVID test     Step 3 Review Visitor Policy  Patient informed of the updated visitor policy   1 visitor allowed per patient   Visitor must screen negative for COVID symptoms   Visitor must wear a mask   Waiting rooms continue to be closed to visitors    Marianna Diana RN

## 2020-08-07 NOTE — TELEPHONE ENCOUNTER
Pre-Procedure Negative COVID Test     Step 1 Patient Notification  Patient notified of the negative COVID test result    Step 2 Patient Information  Verified the patient remains symptom free   Patient informed to contact the ordering provider if any of the symptoms develop prior to the procedure    Fever/Chills   Cough   Shortness of breath   New loss of taste or smell  Sore throat  Muscle or body aches  Headaches  Fatigue  Vomiting or diarrhea    Step 3 Review Visitor Policy  Patient informed of the updated visitor policy   1 visitor allowed per patient   Visitor must screen negative for COVID symptoms   Visitor must wear a mask  Waiting rooms continue to be closed to visitors    Ashlie Gonzalez RN

## 2020-08-10 ENCOUNTER — HOSPITAL ENCOUNTER (OUTPATIENT)
Facility: CLINIC | Age: 65
Discharge: HOME | End: 2020-08-10
Admitting: RADIOLOGY
Payer: MEDICARE

## 2020-08-10 ENCOUNTER — HOSPITAL ENCOUNTER (OUTPATIENT)
Dept: ULTRASOUND IMAGING | Facility: CLINIC | Age: 65
End: 2020-08-10
Attending: FAMILY MEDICINE
Payer: MEDICARE

## 2020-08-10 VITALS
SYSTOLIC BLOOD PRESSURE: 157 MMHG | OXYGEN SATURATION: 99 % | RESPIRATION RATE: 18 BRPM | HEART RATE: 55 BPM | DIASTOLIC BLOOD PRESSURE: 61 MMHG | TEMPERATURE: 96.9 F

## 2020-08-10 DIAGNOSIS — K70.31 ALCOHOLIC CIRRHOSIS OF LIVER WITH ASCITES (H): ICD-10-CM

## 2020-08-10 LAB
INR PPP: 1.3 (ref 0.86–1.14)
PLATELET # BLD AUTO: 128 10E9/L (ref 150–450)

## 2020-08-10 PROCEDURE — 27210190 US PARACENTESIS

## 2020-08-10 PROCEDURE — 85049 AUTOMATED PLATELET COUNT: CPT

## 2020-08-10 PROCEDURE — 25000125 ZZHC RX 250: Performed by: RADIOLOGY

## 2020-08-10 PROCEDURE — 85610 PROTHROMBIN TIME: CPT

## 2020-08-10 PROCEDURE — 36415 COLL VENOUS BLD VENIPUNCTURE: CPT

## 2020-08-10 RX ORDER — LIDOCAINE 40 MG/G
CREAM TOPICAL
Status: DISCONTINUED | OUTPATIENT
Start: 2020-08-10 | End: 2020-08-10 | Stop reason: HOSPADM

## 2020-08-10 RX ORDER — DEXTROSE MONOHYDRATE 25 G/50ML
25-50 INJECTION, SOLUTION INTRAVENOUS
Status: DISCONTINUED | OUTPATIENT
Start: 2020-08-10 | End: 2020-08-10 | Stop reason: HOSPADM

## 2020-08-10 RX ORDER — NICOTINE POLACRILEX 4 MG
15-30 LOZENGE BUCCAL
Status: DISCONTINUED | OUTPATIENT
Start: 2020-08-10 | End: 2020-08-10 | Stop reason: HOSPADM

## 2020-08-10 RX ORDER — ALBUMIN (HUMAN) 12.5 G/50ML
12.5 SOLUTION INTRAVENOUS ONCE
Status: DISCONTINUED | OUTPATIENT
Start: 2020-08-10 | End: 2020-08-10 | Stop reason: HOSPADM

## 2020-08-10 RX ORDER — LIDOCAINE HYDROCHLORIDE 10 MG/ML
10 INJECTION, SOLUTION EPIDURAL; INFILTRATION; INTRACAUDAL; PERINEURAL ONCE
Status: COMPLETED | OUTPATIENT
Start: 2020-08-10 | End: 2020-08-10

## 2020-08-10 RX ADMIN — LIDOCAINE HYDROCHLORIDE 10 ML: 10 INJECTION, SOLUTION EPIDURAL; INFILTRATION; INTRACAUDAL; PERINEURAL at 10:27

## 2020-08-10 NOTE — PROGRESS NOTES
Care Suites Discharge Nursing Note    Patient Information  Name: Jim Richard  Age: 65 year old    Discharge Education:  Discharge instructions reviewed: Yes  Additional education/resources provided: none  Patient/patient representative verbalizes understanding: Yes  Patient discharging on new medications: No  Medication education completed: N/A    Discharge Plans:   Discharge location: home  Discharge ride contacted: Yes  Approximate discharge time: 1103    Discharge Criteria:  Discharge criteria met and vital signs stable: Yes    Patient Belongs:  Patient belongings returned to patient: N/A    Adriana Fajardo RN

## 2020-08-10 NOTE — PROGRESS NOTES
[facility] Suit[facility] Post Procedure Note    Patient Information  Name: Jim Richard  Age: 65 year old    Post Procedure  Time patient returned to Care Suites: 1050  Concerns/abnormal assessment: none  If abnormal assessment, provider notified: n/a  Plan/Other: total of 2,100 ml removed from right side of abd. Light re in color.  Band aid to site.    Adriana Fajardo RN

## 2020-08-10 NOTE — PROGRESS NOTES
PATIENT/VISITOR WELLNESS SCREENING    Step 1 Patient Screening    1. In the last month, have you been in contact with someone who was confirmed or suspected to have Coronavirus/COVID-19? Yes: states friend tested positive, however when around this friend, both people were masked.    2. Do you have the following symptoms?  Fever/Chills? No   Cough? No   Shortness of breath? No   New loss of taste or smell? No  Sore throat? No  Muscle or body aches? No  Headaches? No  Fatigue? No  Vomiting or diarrhea? No      Step 3 Refer to logic grid below for actions    NO SYMPTOM(S)    ACTIONS:  1. Standard rooming process  2. Provider to assess per normal protocol  3. Implement precautions as needed and per guidelines     POSITIVE SYMPTOM(S)  If positive for ANY of the following symptoms: fever, cough, shortness of breath, rash    ACTION:  1. Continue to have the patient wear a mask   2. Room patient as soon as possible  3. Don appropriate PPE when entering room  4. Provider evaluation

## 2020-08-10 NOTE — DISCHARGE INSTRUCTIONS

## 2020-08-10 NOTE — PROGRESS NOTES
Care Suites Admission Nursing Note    Patient Information  Name: Jim Richard  Age: 65 year old  Reason for admission: paracentesis  Care Suites arrival time:     Patient Admission/Assessment   Pre-procedure assessment complete: Yes  If abnormal assessment/labs, provider notified: No  NPO: N/A  Medications held per instructions/orders: N/A  Consent: deferred  If applicable, pregnancy test status: deferred  Patient oriented to room: Yes  Education/questions answered: Yes  Plan/other: Labs WDL, AVS reviewed with pt.    Discharge Planning  Accompanied by: none  Discharge name/phone number: friend to .   Overnight post sedation caregiver: n/a  Discharge location: home    Adriana Fajardo RN

## 2020-08-14 DIAGNOSIS — Z11.59 ENCOUNTER FOR SCREENING FOR OTHER VIRAL DISEASES: Primary | ICD-10-CM

## 2020-08-18 ENCOUNTER — TELEPHONE (OUTPATIENT)
Dept: MEDSURG UNIT | Facility: CLINIC | Age: 65
End: 2020-08-18

## 2020-08-18 DIAGNOSIS — Z11.59 ENCOUNTER FOR SCREENING FOR OTHER VIRAL DISEASES: ICD-10-CM

## 2020-08-18 PROCEDURE — U0003 INFECTIOUS AGENT DETECTION BY NUCLEIC ACID (DNA OR RNA); SEVERE ACUTE RESPIRATORY SYNDROME CORONAVIRUS 2 (SARS-COV-2) (CORONAVIRUS DISEASE [COVID-19]), AMPLIFIED PROBE TECHNIQUE, MAKING USE OF HIGH THROUGHPUT TECHNOLOGIES AS DESCRIBED BY CMS-2020-01-R: HCPCS | Performed by: FAMILY MEDICINE

## 2020-08-19 LAB
SARS-COV-2 RNA SPEC QL NAA+PROBE: NOT DETECTED
SPECIMEN SOURCE: NORMAL

## 2020-08-19 NOTE — TELEPHONE ENCOUNTER
Pre-Procedure Unconfirmed COVID Test     Pt has been in contact with someone 2 weeks ago who was Diagnosed with COVID.  Lab work is still pending and will be done sometime after 1600. If results are not posted call [facility] lab [phone]        Step 1 COVID Screening  The patient was screened for COVID symptoms due to the inability to confirm the patient's COVID status (positive or negative test)    Patient reports the following:  Fever/Chills? No   Cough? No   Shortness of breath? No   New loss of taste or smell? No  Sore throat? No  Muscle or body aches? No  Headaches? No  Fatigue? No  Vomiting or diarrhea? No    Patient informed to contact the ordering provider if any of the symptoms develop prior to the procedure    Step 2 Screening Results (Skip if the patient is negative for symptoms)  If the patient is positive for new or worsening symptoms, contact the ordering provider to determine if the procedure is deemed necessary    Determine if the procedure can be re-scheduled when the patient is symptom free or has a negative COVID test     Step 3 Review Visitor Policy  Patient informed of the updated visitor policy     1 visitor allowed per patient    Visitor name:    Visitor must screen negative for COVID symptoms   Visitor must wear a mask   Waiting rooms continue to be closed to visitors    Marianna Diana RN

## 2020-08-20 ENCOUNTER — HOSPITAL ENCOUNTER (OUTPATIENT)
Facility: CLINIC | Age: 65
Discharge: HOME | End: 2020-08-20
Admitting: RADIOLOGY
Payer: MEDICARE

## 2020-08-20 ENCOUNTER — HOSPITAL ENCOUNTER (OUTPATIENT)
Dept: ULTRASOUND IMAGING | Facility: CLINIC | Age: 65
End: 2020-08-20
Attending: FAMILY MEDICINE
Payer: MEDICARE

## 2020-08-20 VITALS
WEIGHT: 175 LBS | SYSTOLIC BLOOD PRESSURE: 145 MMHG | TEMPERATURE: 97.2 F | HEIGHT: 67 IN | DIASTOLIC BLOOD PRESSURE: 58 MMHG | BODY MASS INDEX: 27.47 KG/M2 | HEART RATE: 56 BPM | OXYGEN SATURATION: 98 % | RESPIRATION RATE: 18 BRPM

## 2020-08-20 DIAGNOSIS — K70.31 ALCOHOLIC CIRRHOSIS OF LIVER WITH ASCITES (H): ICD-10-CM

## 2020-08-20 PROCEDURE — 49083 ABD PARACENTESIS W/IMAGING: CPT

## 2020-08-20 PROCEDURE — 40000863 ZZH STATISTIC RADIOLOGY XRAY, US, CT, MAR, NM

## 2020-08-20 RX ORDER — LIDOCAINE HYDROCHLORIDE 10 MG/ML
10 INJECTION, SOLUTION EPIDURAL; INFILTRATION; INTRACAUDAL; PERINEURAL ONCE
Status: COMPLETED | OUTPATIENT
Start: 2020-08-20 | End: 2020-08-20

## 2020-08-20 RX ADMIN — LIDOCAINE HYDROCHLORIDE 10 ML: 10 INJECTION, SOLUTION EPIDURAL; INFILTRATION; INTRACAUDAL; PERINEURAL at 10:49

## 2020-08-20 ASSESSMENT — MIFFLIN-ST. JEOR: SCORE: 1532.42

## 2020-08-20 NOTE — PROGRESS NOTES
Care Suites Admission Nursing Note    Patient Information  Name: Jim Richard  Age: 65 year old  Reason for admission: paracentesis  Care Suites arrival time: 1000        Patient Admission/Assessment   Pre-procedure assessment complete: Yes  If abnormal assessment/labs, provider notified: N/A  NPO: N/A  Medications held per instructions/orders: N/A  Consent: MD to obtain  If applicable, pregnancy test status: N/A  Patient oriented to room: Yes  Education/questions answered: Yes  Plan/other: per procedural plan of care    Discharge Planning  Discharge name/phone number: see epic  Overnight post sedation caregiver: N/A  Discharge location: home    Sully Carpenter RN

## 2020-08-20 NOTE — PROGRESS NOTES
PATIENT/VISITOR WELLNESS SCREENING    Step 1 Patient Screening    1. In the last month, have you been in contact with someone who was confirmed or suspected to have Coronavirus/COVID-19? Yes: pt exposed to roommate who was positive. Pt is negative 8/18 and pt is asymptomatic.     2. Do you have the following symptoms?  Fever/Chills? No   Cough? No   Shortness of breath? No   New loss of taste or smell? No  Sore throat? No  Muscle or body aches? No  Headaches? No  Fatigue? No  Vomiting or diarrhea? No

## 2020-08-20 NOTE — PROGRESS NOTES
Care Suites Discharge Nursing Note    Patient Information  Name: Jim Richard  Age: 65 year old    Discharge Education:  Discharge instructions reviewed: Yes  Additional education/resources provided: nonoe  Patient/patient representative verbalizes understanding: Yes  Patient discharging on new medications: No  Medication education completed: N/A    Discharge Plans:   Discharge location: home  Discharge ride contacted: Yes  Approximate discharge time: 1130    Discharge Criteria:  Discharge criteria met and vital signs stable: Yes    Patient Belongs:  Patient belongings returned to patient: Yes    Sully Carpenter RN

## 2020-08-20 NOTE — DISCHARGE INSTRUCTIONS

## 2020-08-20 NOTE — PROGRESS NOTES
Pt up and ambulating in halls post procedure and tolerated well. Pt tolerated po food and fluids post procedure.  Denies pain, nausea or vomiting. Paracentesis site without bleeding or hematoma.

## 2020-08-20 NOTE — PROCEDURES
[facility]    Procedure: Imaging Procedure Note    Date/Time: 8/20/2020 4:53 PM  Performed by: Rosa Maria Meyer MD  Authorized by: Rosa Maria Meyer MD     UNIVERSAL PROTOCOL   Site Marked: Yes  Prior Images Obtained and Reviewed:  Yes  Required items: Required blood products, implants, devices and special equipment available    Patient identity confirmed:  Verbally with patient  Patient was reevaluated immediately before administering moderate or deep sedation or anesthesia  Confirmation Checklist:  Patient's identity using two indicators, relevant allergies, procedure was appropriate and matched the consent or emergent situation and correct equipment/implants were available  Time out: Immediately prior to the procedure a time out was called    Universal Protocol: the Joint Commission Universal Protocol was followed    Preparation: Patient was prepped and draped in usual sterile fashion    ESBL (mL):  0         ANESTHESIA    Anesthesia: Local infiltration  Local Anesthetic:  Lidocaine 1% without epinephrine  Anesthetic Total (mL):  5      SEDATION    Patient Sedated: No    See dictated procedure note for full details.  PROCEDURE   Patient Tolerance:  Patient tolerated the procedure well with no immediate complications  Describe Procedure: US-guided paracentesis  Length of time physician/provider present for 1:1 monitoring during sedation: 0

## 2020-08-20 NOTE — PROGRESS NOTES
Care Suites Post Procedure Note    Patient Information  Name: Jim Richard  Age: 65 year old    Post Procedure  Time patient returned to Care Suites: 1105  Concerns/abnormal assessment: none  If abnormal assessment, provider notified: N/A  Plan/Other: post are as ordered VSS, up to BR given courtesy meal and OJ    Nic Carpenter RN

## 2020-08-20 NOTE — PROGRESS NOTES
Paracentesis:  1049 TO per Dr Meyer and [employer] tech   Pt tolerated well. VSS. 2400 cc yellow fluid removed from abdomen w/o difficulty. Bandaid applied to site - CDI.          1102 Pt back to Care Suites. Detailed report given to Sully OVALLE.

## 2020-08-28 ENCOUNTER — TELEPHONE (OUTPATIENT)
Dept: MEDSURG UNIT | Facility: CLINIC | Age: 65
End: 2020-08-28

## 2020-08-28 DIAGNOSIS — Z11.59 ENCOUNTER FOR SCREENING FOR OTHER VIRAL DISEASES: ICD-10-CM

## 2020-08-28 LAB
SARS-COV-2 RNA SPEC QL NAA+PROBE: NOT DETECTED
SPECIMEN SOURCE: NORMAL

## 2020-08-28 PROCEDURE — U0003 INFECTIOUS AGENT DETECTION BY NUCLEIC ACID (DNA OR RNA); SEVERE ACUTE RESPIRATORY SYNDROME CORONAVIRUS 2 (SARS-COV-2) (CORONAVIRUS DISEASE [COVID-19]), AMPLIFIED PROBE TECHNIQUE, MAKING USE OF HIGH THROUGHPUT TECHNOLOGIES AS DESCRIBED BY CMS-2020-01-R: HCPCS | Performed by: FAMILY MEDICINE

## 2020-08-28 NOTE — TELEPHONE ENCOUNTER
Pre-Procedure Unconfirmed COVID Test     Step 1 COVID Screening  The patient was screened for COVID symptoms due to the inability to confirm the patient's COVID status (positive or negative test)    Patient reports the following:  Fever/Chills? No   Cough? No   Shortness of breath? No   New loss of taste or smell? No  Sore throat? No  Muscle or body aches? No  Headaches? No  Fatigue? No  Vomiting or diarrhea? No    Patient informed to contact the ordering provider if any of the symptoms develop prior to the procedure    Step 2 Screening Results (Skip if the patient is negative for symptoms)  If the patient is positive for new or worsening symptoms, contact the ordering provider to determine if the procedure is deemed necessary    Determine if the procedure can be re-scheduled when the patient is symptom free or has a negative COVID test     Step 3 Review Visitor Policy  Patient informed of the updated visitor policy     1 visitor allowed per patient    Visitor name:    Visitor must screen negative for COVID symptoms   Visitor must wear a mask   Waiting rooms continue to be closed to visitors    Brad Mccullough RN

## 2020-08-31 ENCOUNTER — HOSPITAL ENCOUNTER (OUTPATIENT)
Facility: CLINIC | Age: 65
Discharge: HOME | End: 2020-08-31
Admitting: RADIOLOGY
Payer: MEDICARE

## 2020-08-31 ENCOUNTER — HOSPITAL ENCOUNTER (OUTPATIENT)
Dept: ULTRASOUND IMAGING | Facility: CLINIC | Age: 65
End: 2020-08-31
Attending: FAMILY MEDICINE
Payer: MEDICARE

## 2020-08-31 VITALS
RESPIRATION RATE: 16 BRPM | OXYGEN SATURATION: 98 % | DIASTOLIC BLOOD PRESSURE: 72 MMHG | HEART RATE: 47 BPM | TEMPERATURE: 98.3 F | SYSTOLIC BLOOD PRESSURE: 155 MMHG

## 2020-08-31 DIAGNOSIS — K70.31 ALCOHOLIC CIRRHOSIS OF LIVER WITH ASCITES (H): ICD-10-CM

## 2020-08-31 PROCEDURE — 25000125 ZZHC RX 250: Performed by: RADIOLOGY

## 2020-08-31 PROCEDURE — 40000863 ZZH STATISTIC RADIOLOGY XRAY, US, CT, MAR, NM

## 2020-08-31 PROCEDURE — 49083 ABD PARACENTESIS W/IMAGING: CPT

## 2020-08-31 RX ORDER — NICOTINE POLACRILEX 4 MG
15-30 LOZENGE BUCCAL
Status: DISCONTINUED | OUTPATIENT
Start: 2020-08-31 | End: 2020-08-31 | Stop reason: HOSPADM

## 2020-08-31 RX ORDER — LIDOCAINE 40 MG/G
CREAM TOPICAL
Status: DISCONTINUED | OUTPATIENT
Start: 2020-08-31 | End: 2020-08-31 | Stop reason: HOSPADM

## 2020-08-31 RX ORDER — DEXTROSE MONOHYDRATE 25 G/50ML
25-50 INJECTION, SOLUTION INTRAVENOUS
Status: DISCONTINUED | OUTPATIENT
Start: 2020-08-31 | End: 2020-08-31 | Stop reason: HOSPADM

## 2020-08-31 RX ORDER — LIDOCAINE HYDROCHLORIDE 10 MG/ML
10 INJECTION, SOLUTION EPIDURAL; INFILTRATION; INTRACAUDAL; PERINEURAL ONCE
Status: COMPLETED | OUTPATIENT
Start: 2020-08-31 | End: 2020-08-31

## 2020-08-31 RX ORDER — ALBUMIN (HUMAN) 12.5 G/50ML
12.5 SOLUTION INTRAVENOUS ONCE
Status: DISCONTINUED | OUTPATIENT
Start: 2020-08-31 | End: 2020-08-31 | Stop reason: HOSPADM

## 2020-08-31 RX ADMIN — LIDOCAINE HYDROCHLORIDE 10 ML: 10 INJECTION, SOLUTION EPIDURAL; INFILTRATION; INTRACAUDAL; PERINEURAL at 10:37

## 2020-08-31 NOTE — PROCEDURES
[facility]    Procedure: IR Procedure Note    Date/Time: 8/31/2020 10:30 AM  Performed by: Rosa Maria Meyer MD  Authorized by: Rosa Maria Meyer MD     UNIVERSAL PROTOCOL   Site Marked: NA  Prior Images Obtained and Reviewed:  Yes  Required items: Required blood products, implants, devices and special equipment available    Patient identity confirmed:  Verbally with patient, arm band, provided demographic data and hospital-assigned identification number  Patient was reevaluated immediately before administering moderate or deep sedation or anesthesia  Confirmation Checklist:  Patient's identity using two indicators, relevant allergies, procedure was appropriate and matched the consent or emergent situation and correct equipment/implants were available  Time out: Immediately prior to the procedure a time out was called    Universal Protocol: the Joint Commission Universal Protocol was followed    Preparation: Patient was prepped and draped in usual sterile fashion           ANESTHESIA    Anesthesia: Local infiltration  Local Anesthetic:  Lidocaine 1% without epinephrine  Anesthetic Total (mL):  10      SEDATION    Patient Sedated: No    See dictated procedure note for full details.  Findings: US-guided paracentesis    Specimens: none    Complications: None    Condition: Stable    PROCEDURE   Patient Tolerance:  Patient tolerated the procedure well with no immediate complications    Length of time physician/provider present for 1:1 monitoring during sedation: 0

## 2020-08-31 NOTE — PROGRESS NOTES
Paracentesis:     Pt tolerated well. VSS. 2,200 cc yellow fluid removed from abdomen w/o difficulty. Bandaid applied to site - CDI.     10:52 Pt back to Care Suites. Detailed report given to VASQUEZ Kuhn.

## 2020-08-31 NOTE — DISCHARGE INSTRUCTIONS

## 2020-08-31 NOTE — PROGRESS NOTES
Care Suites Discharge Nursing Note    Patient Information  Name: Jim Richard  Age: 65 year old    Discharge Education:  Discharge instructions reviewed: Yes  Additional education/resources provided: na    Patient/patient representative verbalizes understanding: Yes  Patient discharging on new medications: No  Medication education completed: N/A    Discharge Plans:   Discharge location: home  Discharge ride contacted: Yes  Approximate discharge time: 1110    Discharge Criteria:  Discharge criteria met and vital signs stable: Yes.  Pt minna OJ post procedure up to BR, called friend for ride home    Patient Belongs:  Patient belongings returned to patient: Yes    Nic Carpenter RN

## 2020-08-31 NOTE — PROGRESS NOTES
Care Suites Post Procedure Note    Patient Information  Name: Jim Richard  Age: 65 year old    Post Procedure  Time patient returned to Care Suites: 1055  Concerns/abnormal assessment: none at this time  If abnormal assessment, provider notified: N/A  Plan/Other: post care as ordered pt given OJ    Nic Carpenter RN

## 2020-08-31 NOTE — PROGRESS NOTES
Care Suites Admission Nursing Note    Patient Information  Name: Jim Richard  Age: 65 year old  Reason for admission: paracentesis  Care Suites arrival time: 0930    Visitor Information  Name: na  Informed of visitor restrictions: N/A  1 visitor allowed per patient   Visitor must screen negative for COVID symptoms   Visitor must wear a mask  Waiting rooms closed to visitors    Patient Admission/Assessment   Pre-procedure assessment complete: Yes  If abnormal assessment/labs, provider notified: N/A  NPO: N/A  Medications held per instructions/orders: N/A  Consent: deferred  If applicable, pregnancy test status: deferred  Patient oriented to room: Yes  Education/questions answered: Yes  Plan/other: proceed as planned discharge paperwork complete    Discharge Planning  Discharge name/phone number:  --  Overnight post sedation caregiver:   Discharge location: home    Nic Carpenter RN

## 2020-09-08 DIAGNOSIS — Z11.59 ENCOUNTER FOR SCREENING FOR OTHER VIRAL DISEASES: ICD-10-CM

## 2020-09-08 DIAGNOSIS — Z11.59 ENCOUNTER FOR SCREENING FOR OTHER VIRAL DISEASES: Primary | ICD-10-CM

## 2020-09-08 PROCEDURE — U0003 INFECTIOUS AGENT DETECTION BY NUCLEIC ACID (DNA OR RNA); SEVERE ACUTE RESPIRATORY SYNDROME CORONAVIRUS 2 (SARS-COV-2) (CORONAVIRUS DISEASE [COVID-19]), AMPLIFIED PROBE TECHNIQUE, MAKING USE OF HIGH THROUGHPUT TECHNOLOGIES AS DESCRIBED BY CMS-2020-01-R: HCPCS | Performed by: FAMILY MEDICINE

## 2020-09-09 ENCOUNTER — TELEPHONE (OUTPATIENT)
Dept: MEDSURG UNIT | Facility: CLINIC | Age: 65
End: 2020-09-09

## 2020-09-09 LAB
SARS-COV-2 RNA SPEC QL NAA+PROBE: NOT DETECTED
SPECIMEN SOURCE: NORMAL

## 2020-09-09 NOTE — TELEPHONE ENCOUNTER
Pre-Procedure Unconfirmed COVID Test     Step 1 COVID Screening  The patient was screened for COVID symptoms due to the inability to confirm the patient's COVID status (positive or negative test)    Patient reports the following:  Fever/Chills? No   Cough? No   Shortness of breath? No   New loss of taste or smell? No  Sore throat? No  Muscle or body aches? No  Headaches? No  Fatigue? No  Vomiting or diarrhea? No      Patient informed to contact the ordering provider if any of the symptoms develop prior to the procedure    Step 2 Screening Results (Skip if the patient is negative for symptoms)  If the patient is positive for new or worsening symptoms, contact the ordering provider to determine if the procedure is deemed necessary    Determine if the procedure can be re-scheduled when the patient is symptom free or has a negative COVID test     Step 3 Review Visitor Policy  Patient informed of the updated visitor policy     1 visitor allowed per patient    Visitor name: none   Visitor must screen negative for COVID symptoms   Visitor must wear a mask   Waiting rooms continue to be closed to visitors    Adriana Fajardo RN

## 2020-09-10 ENCOUNTER — HOSPITAL ENCOUNTER (OUTPATIENT)
Facility: CLINIC | Age: 65
Discharge: HOME | End: 2020-09-10
Admitting: RADIOLOGY
Payer: MEDICARE

## 2020-09-10 ENCOUNTER — HOSPITAL ENCOUNTER (OUTPATIENT)
Dept: ULTRASOUND IMAGING | Facility: CLINIC | Age: 65
Setting detail: OBSERVATION
End: 2020-09-10
Attending: FAMILY MEDICINE
Payer: MEDICARE

## 2020-09-10 VITALS
SYSTOLIC BLOOD PRESSURE: 138 MMHG | DIASTOLIC BLOOD PRESSURE: 52 MMHG | RESPIRATION RATE: 16 BRPM | TEMPERATURE: 97.1 F | OXYGEN SATURATION: 100 % | HEART RATE: 53 BPM

## 2020-09-10 DIAGNOSIS — K70.31 ALCOHOLIC CIRRHOSIS OF LIVER WITH ASCITES (H): ICD-10-CM

## 2020-09-10 PROCEDURE — 36415 COLL VENOUS BLD VENIPUNCTURE: CPT

## 2020-09-10 PROCEDURE — 40000863 ZZH STATISTIC RADIOLOGY XRAY, US, CT, MAR, NM

## 2020-09-10 PROCEDURE — 25000125 ZZHC RX 250: Performed by: RADIOLOGY

## 2020-09-10 PROCEDURE — 27210190 US PARACENTESIS

## 2020-09-10 RX ORDER — ALBUMIN (HUMAN) 12.5 G/50ML
12.5 SOLUTION INTRAVENOUS ONCE
Status: DISCONTINUED | OUTPATIENT
Start: 2020-09-10 | End: 2020-09-10 | Stop reason: HOSPADM

## 2020-09-10 RX ORDER — NICOTINE POLACRILEX 4 MG
15-30 LOZENGE BUCCAL
Status: DISCONTINUED | OUTPATIENT
Start: 2020-09-10 | End: 2020-09-10 | Stop reason: HOSPADM

## 2020-09-10 RX ORDER — LIDOCAINE HYDROCHLORIDE 10 MG/ML
10 INJECTION, SOLUTION EPIDURAL; INFILTRATION; INTRACAUDAL; PERINEURAL ONCE
Status: COMPLETED | OUTPATIENT
Start: 2020-09-10 | End: 2020-09-10

## 2020-09-10 RX ORDER — DEXTROSE MONOHYDRATE 25 G/50ML
25-50 INJECTION, SOLUTION INTRAVENOUS
Status: DISCONTINUED | OUTPATIENT
Start: 2020-09-10 | End: 2020-09-10 | Stop reason: HOSPADM

## 2020-09-10 RX ORDER — LIDOCAINE 40 MG/G
CREAM TOPICAL
Status: DISCONTINUED | OUTPATIENT
Start: 2020-09-10 | End: 2020-09-10 | Stop reason: HOSPADM

## 2020-09-10 RX ADMIN — LIDOCAINE HYDROCHLORIDE 15 ML: 10 INJECTION, SOLUTION EPIDURAL; INFILTRATION; INTRACAUDAL; PERINEURAL at 09:54

## 2020-09-10 NOTE — PROGRESS NOTES
RADIOLOGY PROCEDURE NOTE  Patient name: Jim Richard  MRN: [id number]  : 1955    Pre-procedure diagnosis: Ascites  Post-procedure diagnosis: Same    Procedure Date/Time: September 10, 2020  10:01 AM  Procedure: Paracentesis  Estimated blood loss: None  Specimen(s) collected with description: Ascites.  The patient tolerated the procedure well with no immediate complications.    See imaging dictation for procedural details and findings.    Provider name: Sebastien Pereira MD  Assistant(s):None

## 2020-09-10 NOTE — PROGRESS NOTES
PATIENT/VISITOR WELLNESS SCREENING    Step 1 Patient Screening    1. In the last month, have you been in contact with someone who was confirmed or suspected to have Coronavirus/COVID-19? No    2. Do you have the following symptoms?  Fever/Chills? No   Cough? No   Shortness of breath? No   New loss of taste or smell? No  Sore throat? No  Muscle or body aches? No  Headaches? No  Fatigue? No  Vomiting or diarrhea? No         Step 3 Refer to logic grid below for actions    NO SYMPTOM(S)    ACTIONS:  1. Standard rooming process  2. Provider to assess per normal protocol  3. Implement precautions as needed and per guidelines     POSITIVE SYMPTOM(S)  If positive for ANY of the following symptoms: fever, cough, shortness of breath, rash    ACTION:  1. Continue to have the patient wear a mask   2. Room patient as soon as possible  3. Don appropriate PPE when entering room  4. Provider evaluation    Care Suites Admission Nursing Note    Patient Information  Name: Jim Richard  Age: 65 year old  Reason for admission: Paracentesis  Care Suites arrival time: ~0900      Patient Admission/Assessment   Pre-procedure assessment complete: Yes  If abnormal assessment/labs, provider notified: N/A  NPO: N/A  Medications held per instructions/orders: Yes  Consent: deferred  Patient oriented to room: Yes  Education/questions answered: Yes  Plan/other: Procedure ~1030    Discharge Planning  Discharge location: [place]    Marianna Diana RN

## 2020-09-10 NOTE — DISCHARGE INSTRUCTIONS

## 2020-09-10 NOTE — PROGRESS NOTES
Care Suites Discharge Nursing Note    Patient Information  Name: Jim Richard  Age: 65 year old    Discharge Education:  Discharge instructions reviewed: Yes  Additional education/resources provided: n/a  Patient/patient representative verbalizes understanding: Yes  Patient discharging on new medications: No  Medication education completed: Yes    Discharge Plans:   Discharge location: home  Discharge ride contacted: Yes  Approximate discharge time: ~1030    Discharge Criteria:  Discharge criteria met and vital signs stable: Yes    Patient Belongs:  Patient belongings returned to patient: Yes    Marianna Diana RN

## 2020-09-10 NOTE — PROGRESS NOTES
0954 Time Out done.    0954 Procedure started     Paracentesis: Pt tolerated well. VSS. 1.4L dark yellow  fluid removed from abdomen w/o difficulty. Bandaid applied to site - CDI.     1004 Procedure completed    ~1006 Pt back to Care Suites.

## 2020-09-11 ENCOUNTER — HOSPITAL ENCOUNTER (OUTPATIENT)
Facility: CLINIC | Age: 65
End: 2020-09-11
Payer: MEDICARE

## 2020-09-11 DIAGNOSIS — Z11.59 ENCOUNTER FOR SCREENING FOR OTHER VIRAL DISEASES: Primary | ICD-10-CM

## 2020-09-21 ENCOUNTER — TELEPHONE (OUTPATIENT)
Dept: FAMILY MEDICINE | Facility: CLINIC | Age: 65
End: 2020-09-21

## 2020-09-21 DIAGNOSIS — Z11.59 ENCOUNTER FOR SCREENING FOR OTHER VIRAL DISEASES: ICD-10-CM

## 2020-09-21 DIAGNOSIS — Z20.822 ENCOUNTER FOR LABORATORY TESTING FOR COVID-19 VIRUS: Primary | ICD-10-CM

## 2020-09-21 PROCEDURE — U0003 INFECTIOUS AGENT DETECTION BY NUCLEIC ACID (DNA OR RNA); SEVERE ACUTE RESPIRATORY SYNDROME CORONAVIRUS 2 (SARS-COV-2) (CORONAVIRUS DISEASE [COVID-19]), AMPLIFIED PROBE TECHNIQUE, MAKING USE OF HIGH THROUGHPUT TECHNOLOGIES AS DESCRIBED BY CMS-2020-01-R: HCPCS | Performed by: FAMILY MEDICINE

## 2020-09-22 ENCOUNTER — TELEPHONE (OUTPATIENT)
Dept: MEDSURG UNIT | Facility: CLINIC | Age: 65
End: 2020-09-22

## 2020-09-22 LAB
SARS-COV-2 RNA SPEC QL NAA+PROBE: NOT DETECTED
SPECIMEN SOURCE: NORMAL

## 2020-09-22 NOTE — TELEPHONE ENCOUNTER
Pre-Procedure Unconfirmed COVID Test     Patient had COVID test 9/21/20, in process, will follow-up    Step 1 COVID Screening  The patient was screened for COVID symptoms due to the inability to confirm the patient's COVID status (positive or negative test)    Patient reports the following:  Fever/Chills? No   Cough? No   Shortness of breath? No   New loss of taste or smell? No  Sore throat? No  Muscle or body aches? No  Headaches? No  Fatigue? No  Vomiting or diarrhea? No    Patient informed to contact the ordering provider if any of the symptoms develop prior to the procedure    Step 2 Screening Results (Skip if the patient is negative for symptoms)  If the patient is positive for new or worsening symptoms, contact the ordering provider to determine if the procedure is deemed necessary    Determine if the procedure can be re-scheduled when the patient is symptom free or has a negative COVID test     Step 3 Review Visitor Policy  Patient informed of the updated visitor policy     1 visitor allowed per patient   Visitor must screen negative for COVID symptoms   Visitor must wear a mask   Waiting rooms continue to be closed to visitors    Dedra Christian RN

## 2020-09-23 ENCOUNTER — HOSPITAL ENCOUNTER (OUTPATIENT)
Dept: ULTRASOUND IMAGING | Facility: CLINIC | Age: 65
End: 2020-09-23
Attending: FAMILY MEDICINE
Payer: MEDICARE

## 2020-09-23 ENCOUNTER — HOSPITAL ENCOUNTER (OUTPATIENT)
Facility: CLINIC | Age: 65
Discharge: HOME | End: 2020-09-23
Admitting: RADIOLOGY
Payer: MEDICARE

## 2020-09-23 VITALS
RESPIRATION RATE: 18 BRPM | DIASTOLIC BLOOD PRESSURE: 56 MMHG | OXYGEN SATURATION: 98 % | SYSTOLIC BLOOD PRESSURE: 127 MMHG | TEMPERATURE: 98.9 F | HEART RATE: 48 BPM

## 2020-09-23 DIAGNOSIS — K70.31 ASCITES DUE TO ALCOHOLIC CIRRHOSIS (H): ICD-10-CM

## 2020-09-23 LAB
INR PPP: 1.27 (ref 0.86–1.14)
PLATELET # BLD AUTO: 156 10E9/L (ref 150–450)

## 2020-09-23 PROCEDURE — 85610 PROTHROMBIN TIME: CPT | Performed by: PHYSICIAN ASSISTANT

## 2020-09-23 PROCEDURE — 40000863 ZZH STATISTIC RADIOLOGY XRAY, US, CT, MAR, NM

## 2020-09-23 PROCEDURE — 85049 AUTOMATED PLATELET COUNT: CPT | Performed by: PHYSICIAN ASSISTANT

## 2020-09-23 PROCEDURE — 49083 ABD PARACENTESIS W/IMAGING: CPT

## 2020-09-23 PROCEDURE — 36415 COLL VENOUS BLD VENIPUNCTURE: CPT | Performed by: PHYSICIAN ASSISTANT

## 2020-09-23 RX ORDER — NICOTINE POLACRILEX 4 MG
15-30 LOZENGE BUCCAL
Status: DISCONTINUED | OUTPATIENT
Start: 2020-09-23 | End: 2020-09-23 | Stop reason: HOSPADM

## 2020-09-23 RX ORDER — ALBUMIN (HUMAN) 12.5 G/50ML
12.5 SOLUTION INTRAVENOUS ONCE
Status: DISCONTINUED | OUTPATIENT
Start: 2020-09-23 | End: 2020-09-23 | Stop reason: HOSPADM

## 2020-09-23 RX ORDER — LIDOCAINE 40 MG/G
CREAM TOPICAL
Status: DISCONTINUED | OUTPATIENT
Start: 2020-09-23 | End: 2020-09-23 | Stop reason: HOSPADM

## 2020-09-23 RX ORDER — DEXTROSE MONOHYDRATE 25 G/50ML
25-50 INJECTION, SOLUTION INTRAVENOUS
Status: DISCONTINUED | OUTPATIENT
Start: 2020-09-23 | End: 2020-09-23 | Stop reason: HOSPADM

## 2020-09-23 RX ORDER — LIDOCAINE HYDROCHLORIDE 10 MG/ML
10 INJECTION, SOLUTION EPIDURAL; INFILTRATION; INTRACAUDAL; PERINEURAL ONCE
Status: COMPLETED | OUTPATIENT
Start: 2020-09-23 | End: 2020-09-23

## 2020-09-23 RX ADMIN — LIDOCAINE HYDROCHLORIDE 10 ML: 10 INJECTION, SOLUTION EPIDURAL; INFILTRATION; INTRACAUDAL; PERINEURAL at 14:18

## 2020-09-23 NOTE — DISCHARGE INSTRUCTIONS

## 2020-09-23 NOTE — PROGRESS NOTES
Care Suites Procedure Nursing Note    Patient Information  Name: Jim Richard  Age: 65 year old    Procedure  Procedure: Paracentesis  Procedure start time: 14:15  Procedure complete time: 14:35  Concerns/abnormal assessment: None    Pt tolerated well. VSS. 1,000 cc re fluid removed from abdomen w/o difficulty.     Bandaid applied to site - CDI.     Care Suites Discharge Nursing Note    Discharge Education:  Discharge instructions reviewed: Yes  Patient/patient representative verbalizes understanding: Yes  Patient discharging on new medications: No  Medication education completed: Yes    Discharge Plans:   Discharge location: home  Discharge ride contacted: Yes  Approximate discharge time: 14:44    Discharge Criteria:  Discharge criteria met and vital signs stable: Yes    Patient Belongs:  Patient belongings returned to patient: Yes

## 2020-09-23 NOTE — PROCEDURES
[facility]    Procedure: Us guided paracentesis    Date/Time: 9/23/2020 2:25 PM  Performed by: Fahrner, Lester, MD  Authorized by: Fahrner, Lester, MD     UNIVERSAL PROTOCOL   Site Marked: Yes  Prior Images Obtained and Reviewed:  Yes  Required items: Required blood products, implants, devices and special equipment available    Patient identity confirmed:  Verbally with patient  NA - No sedation, light sedation, or local anesthesia  Confirmation Checklist:  Patient's identity using two indicators, relevant allergies, procedure was appropriate and matched the consent or emergent situation and correct equipment/implants were available  Time out: Immediately prior to the procedure a time out was called    Universal Protocol: the Joint Commission Universal Protocol was followed    Preparation: Patient was prepped and draped in usual sterile fashion    ESBL (mL):  0         ANESTHESIA    Anesthesia: Local infiltration  Local Anesthetic:  Lidocaine 1% without epinephrine  Anesthetic Total (mL):  5      SEDATION    Patient Sedated: No    See dictated procedure note for full details.  PROCEDURE   Patient Tolerance:  Patient tolerated the procedure well with no immediate complications    Length of time physician/provider present for 1:1 monitoring during sedation: 0

## 2020-09-23 NOTE — PROGRESS NOTES
Care Suites Admission Nursing Note    Patient Information  Name: Jim Richard  Age: 65 year old  Reason for admission: paracentesis  Care Suites arrival time: 1250    Visitor Information  Name: n/a  Informed of visitor restrictions: N/A  1 visitor allowed per patient   Visitor must screen negative for COVID symptoms   Visitor must wear a mask  Waiting rooms closed to visitors    Patient Admission/Assessment   Pre-procedure assessment complete: Yes  If abnormal assessment/labs, provider notified: N/A  NPO: n/a  Medications held per instructions/orders: N/A  Consent: obtained  If applicable, pregnancy test status: na  Patient oriented to room: Yes  Education/questions answered: Yes  Plan/other: proceed    Discharge Planning  Discharge name/phone number: Friend- [email or web address]   Overnight post sedation caregiver: n/a  Discharge location: home  PATIENT/VISITOR WELLNESS SCREENING    Step 1 Patient Screening    1. In the last month, have you been in contact with someone who was confirmed or suspected to have Coronavirus/COVID-19? No    2. Do you have the following symptoms?  Fever/Chills? No   Cough? No   Shortness of breath? No   New loss of taste or smell? No  Sore throat? No  Muscle or body aches? No  Headaches? No  Fatigue? No  Vomiting or diarrhea? No    Step 2 Visitor Screening    1. Name of Visitor (1 visitor per patient): n/a      If the visitor has positive symptoms, notify supervisor/manger  Per policy, the visitor will need to leave the facility     Step 3 Refer to logic grid below for actions    NO SYMPTOM(S)    ACTIONS:  1. Standard rooming process  2. Provider to assess per normal protocol  3. Implement precautions as needed and per guidelines     POSITIVE SYMPTOM(S)  If positive for ANY of the following symptoms: fever, cough, shortness of breath, rash    ACTION:  1. Continue to have the patient wear a mask   2. Room patient as soon as possible  3. Don appropriate PPE when entering room  4. Provider  evaluation    Raul Bills, RN

## 2020-09-30 ENCOUNTER — HOSPITAL ENCOUNTER (OUTPATIENT)
Facility: CLINIC | Age: 65
Setting detail: OBSERVATION
Discharge: HOME | End: 2020-10-01
Attending: EMERGENCY MEDICINE | Admitting: RADIOLOGY
Payer: MEDICARE

## 2020-09-30 DIAGNOSIS — R00.1 BRADYCARDIA: ICD-10-CM

## 2020-09-30 DIAGNOSIS — F10.920 ALCOHOLIC INTOXICATION WITHOUT COMPLICATION (H): ICD-10-CM

## 2020-09-30 DIAGNOSIS — R10.84 ABDOMINAL PAIN, GENERALIZED: ICD-10-CM

## 2020-09-30 DIAGNOSIS — K70.31 ALCOHOLIC CIRRHOSIS OF LIVER WITH ASCITES (H): ICD-10-CM

## 2020-09-30 DIAGNOSIS — T50.902A OVERDOSE, INTENTIONAL SELF-HARM, INITIAL ENCOUNTER (H): ICD-10-CM

## 2020-09-30 DIAGNOSIS — R45.851 SUICIDAL IDEATION: ICD-10-CM

## 2020-09-30 DIAGNOSIS — F32.A DEPRESSION, UNSPECIFIED DEPRESSION TYPE: ICD-10-CM

## 2020-09-30 LAB
ALBUMIN SERPL-MCNC: 3.2 G/DL (ref 3.4–5)
ALP SERPL-CCNC: 173 U/L (ref 40–150)
ALT SERPL W P-5'-P-CCNC: 27 U/L (ref 0–70)
ANION GAP SERPL CALCULATED.3IONS-SCNC: 9 MMOL/L (ref 3–14)
APAP SERPL-MCNC: <2 MG/L (ref 10–20)
AST SERPL W P-5'-P-CCNC: 40 U/L (ref 0–45)
BASOPHILS # BLD AUTO: 0.1 10E9/L (ref 0–0.2)
BASOPHILS NFR BLD AUTO: 1.2 %
BILIRUB SERPL-MCNC: 0.7 MG/DL (ref 0.2–1.3)
BUN SERPL-MCNC: 29 MG/DL (ref 7–30)
CALCIUM SERPL-MCNC: 8.1 MG/DL (ref 8.5–10.1)
CHLORIDE SERPL-SCNC: 110 MMOL/L (ref 94–109)
CO2 SERPL-SCNC: 20 MMOL/L (ref 20–32)
CREAT SERPL-MCNC: 1.59 MG/DL (ref 0.66–1.25)
DIFFERENTIAL METHOD BLD: ABNORMAL
EOSINOPHIL # BLD AUTO: 0.2 10E9/L (ref 0–0.7)
EOSINOPHIL NFR BLD AUTO: 2.2 %
ERYTHROCYTE [DISTWIDTH] IN BLOOD BY AUTOMATED COUNT: 15.1 % (ref 10–15)
ETHANOL SERPL-MCNC: 0.32 G/DL
GFR SERPL CREATININE-BSD FRML MDRD: 44 ML/MIN/{1.73_M2}
GLUCOSE SERPL-MCNC: 75 MG/DL (ref 70–99)
HCT VFR BLD AUTO: 31.5 % (ref 40–53)
HGB BLD-MCNC: 10.4 G/DL (ref 13.3–17.7)
IMM GRANULOCYTES # BLD: 0 10E9/L (ref 0–0.4)
IMM GRANULOCYTES NFR BLD: 0.1 %
INR PPP: 1.18 (ref 0.86–1.14)
INTERPRETATION ECG - MUSE: NORMAL
LYMPHOCYTES # BLD AUTO: 1.2 10E9/L (ref 0.8–5.3)
LYMPHOCYTES NFR BLD AUTO: 17.9 %
MAGNESIUM SERPL-MCNC: 1.8 MG/DL (ref 1.6–2.3)
MCH RBC QN AUTO: 29.5 PG (ref 26.5–33)
MCHC RBC AUTO-ENTMCNC: 33 G/DL (ref 31.5–36.5)
MCV RBC AUTO: 90 FL (ref 78–100)
MONOCYTES # BLD AUTO: 0.4 10E9/L (ref 0–1.3)
MONOCYTES NFR BLD AUTO: 5.9 %
NEUTROPHILS # BLD AUTO: 5 10E9/L (ref 1.6–8.3)
NEUTROPHILS NFR BLD AUTO: 72.7 %
PLATELET # BLD AUTO: 182 10E9/L (ref 150–450)
POTASSIUM SERPL-SCNC: 3.8 MMOL/L (ref 3.4–5.3)
PROT SERPL-MCNC: 7.4 G/DL (ref 6.8–8.8)
RBC # BLD AUTO: 3.52 10E12/L (ref 4.4–5.9)
SALICYLATES SERPL-MCNC: 3 MG/DL
SODIUM SERPL-SCNC: 139 MMOL/L (ref 133–144)
TROPONIN I SERPL-MCNC: <0.015 UG/L (ref 0–0.04)
WBC # BLD AUTO: 6.9 10E9/L (ref 4–11)

## 2020-09-30 PROCEDURE — C9803 HOPD COVID-19 SPEC COLLECT: HCPCS

## 2020-09-30 PROCEDURE — 93005 ELECTROCARDIOGRAM TRACING: CPT | Mod: 76

## 2020-09-30 PROCEDURE — 83735 ASSAY OF MAGNESIUM: CPT | Performed by: EMERGENCY MEDICINE

## 2020-09-30 PROCEDURE — 25000132 ZZH RX MED GY IP 250 OP 250 PS 637: Mod: GY | Performed by: EMERGENCY MEDICINE

## 2020-09-30 PROCEDURE — 25000125 ZZHC RX 250: Performed by: EMERGENCY MEDICINE

## 2020-09-30 PROCEDURE — 96375 TX/PRO/DX INJ NEW DRUG ADDON: CPT

## 2020-09-30 PROCEDURE — 25000128 H RX IP 250 OP 636: Performed by: EMERGENCY MEDICINE

## 2020-09-30 PROCEDURE — 85025 COMPLETE CBC W/AUTO DIFF WBC: CPT | Performed by: EMERGENCY MEDICINE

## 2020-09-30 PROCEDURE — 96366 THER/PROPH/DIAG IV INF ADDON: CPT

## 2020-09-30 PROCEDURE — 80329 ANALGESICS NON-OPIOID 1 OR 2: CPT | Performed by: EMERGENCY MEDICINE

## 2020-09-30 PROCEDURE — U0003 INFECTIOUS AGENT DETECTION BY NUCLEIC ACID (DNA OR RNA); SEVERE ACUTE RESPIRATORY SYNDROME CORONAVIRUS 2 (SARS-COV-2) (CORONAVIRUS DISEASE [COVID-19]), AMPLIFIED PROBE TECHNIQUE, MAKING USE OF HIGH THROUGHPUT TECHNOLOGIES AS DESCRIBED BY CMS-2020-01-R: HCPCS | Performed by: EMERGENCY MEDICINE

## 2020-09-30 PROCEDURE — 99285 EMERGENCY DEPT VISIT HI MDM: CPT | Mod: 25

## 2020-09-30 PROCEDURE — 80320 DRUG SCREEN QUANTALCOHOLS: CPT | Performed by: EMERGENCY MEDICINE

## 2020-09-30 PROCEDURE — 80053 COMPREHEN METABOLIC PANEL: CPT | Performed by: EMERGENCY MEDICINE

## 2020-09-30 PROCEDURE — 85610 PROTHROMBIN TIME: CPT | Performed by: EMERGENCY MEDICINE

## 2020-09-30 PROCEDURE — 25800030 ZZH RX IP 258 OP 636: Performed by: EMERGENCY MEDICINE

## 2020-09-30 PROCEDURE — 84484 ASSAY OF TROPONIN QUANT: CPT | Performed by: EMERGENCY MEDICINE

## 2020-09-30 PROCEDURE — 93005 ELECTROCARDIOGRAM TRACING: CPT

## 2020-09-30 PROCEDURE — 80329 ANALGESICS NON-OPIOID 1 OR 2: CPT | Mod: 59 | Performed by: EMERGENCY MEDICINE

## 2020-09-30 PROCEDURE — 96365 THER/PROPH/DIAG IV INF INIT: CPT

## 2020-09-30 RX ADMIN — THIAMINE HYDROCHLORIDE: 100 INJECTION, SOLUTION INTRAMUSCULAR; INTRAVENOUS at 23:00

## 2020-09-30 RX ADMIN — Medication 20 MG: at 22:47

## 2020-09-30 RX ADMIN — SODIUM CHLORIDE 500 ML: 9 INJECTION, SOLUTION INTRAVENOUS at 22:46

## 2020-09-30 RX ADMIN — LIDOCAINE HYDROCHLORIDE 30 ML: 20 SOLUTION ORAL; TOPICAL at 22:45

## 2020-10-01 ENCOUNTER — APPOINTMENT (OUTPATIENT)
Dept: ULTRASOUND IMAGING | Facility: CLINIC | Age: 65
End: 2020-10-01
Attending: STUDENT IN AN ORGANIZED HEALTH CARE EDUCATION/TRAINING PROGRAM
Payer: MEDICARE

## 2020-10-01 VITALS
HEART RATE: 50 BPM | TEMPERATURE: 98.8 F | SYSTOLIC BLOOD PRESSURE: 140 MMHG | RESPIRATION RATE: 16 BRPM | DIASTOLIC BLOOD PRESSURE: 66 MMHG | OXYGEN SATURATION: 97 %

## 2020-10-01 PROBLEM — R10.84 ABDOMINAL PAIN, GENERALIZED: Status: ACTIVE | Noted: 2020-10-01

## 2020-10-01 PROBLEM — T50.902A OVERDOSE, INTENTIONAL SELF-HARM, INITIAL ENCOUNTER (H): Status: ACTIVE | Noted: 2020-10-01

## 2020-10-01 PROBLEM — R00.1 BRADYCARDIA: Status: ACTIVE | Noted: 2020-10-01

## 2020-10-01 LAB
ALBUMIN FLD-MCNC: 1.1 G/DL
ALBUMIN SERPL-MCNC: 2.8 G/DL (ref 3.4–5)
APPEARANCE FLD: NORMAL
COLOR FLD: YELLOW
INTERPRETATION ECG - MUSE: NORMAL
LYMPHOCYTES NFR FLD MANUAL: 16 %
MONOS+MACROS NFR FLD MANUAL: 20 %
OTHER CELLS FLD MANUAL: 64 %
PROT FLD-MCNC: 2.2 G/DL
SPECIMEN SOURCE FLD: NORMAL
WBC # FLD AUTO: 377 /UL

## 2020-10-01 PROCEDURE — 999N000154 HC STATISTIC RADIOLOGY XRAY, US, CT, MAR, NM

## 2020-10-01 PROCEDURE — 89051 BODY FLUID CELL COUNT: CPT | Performed by: STUDENT IN AN ORGANIZED HEALTH CARE EDUCATION/TRAINING PROGRAM

## 2020-10-01 PROCEDURE — 82042 OTHER SOURCE ALBUMIN QUAN EA: CPT | Performed by: STUDENT IN AN ORGANIZED HEALTH CARE EDUCATION/TRAINING PROGRAM

## 2020-10-01 PROCEDURE — 250N000011 HC RX IP 250 OP 636: Performed by: INTERNAL MEDICINE

## 2020-10-01 PROCEDURE — 99217 PR OBSERVATION CARE DISCHARGE: CPT | Performed by: STUDENT IN AN ORGANIZED HEALTH CARE EDUCATION/TRAINING PROGRAM

## 2020-10-01 PROCEDURE — 84157 ASSAY OF PROTEIN OTHER: CPT | Performed by: STUDENT IN AN ORGANIZED HEALTH CARE EDUCATION/TRAINING PROGRAM

## 2020-10-01 PROCEDURE — 82040 ASSAY OF SERUM ALBUMIN: CPT | Performed by: STUDENT IN AN ORGANIZED HEALTH CARE EDUCATION/TRAINING PROGRAM

## 2020-10-01 PROCEDURE — 250N000013 HC RX MED GY IP 250 OP 250 PS 637: Mod: GY | Performed by: PSYCHIATRY & NEUROLOGY

## 2020-10-01 PROCEDURE — G0378 HOSPITAL OBSERVATION PER HR: HCPCS

## 2020-10-01 PROCEDURE — 36415 COLL VENOUS BLD VENIPUNCTURE: CPT | Performed by: STUDENT IN AN ORGANIZED HEALTH CARE EDUCATION/TRAINING PROGRAM

## 2020-10-01 PROCEDURE — 272N000021 US PARACENTESIS

## 2020-10-01 PROCEDURE — 99221 1ST HOSP IP/OBS SF/LOW 40: CPT | Performed by: PSYCHIATRY & NEUROLOGY

## 2020-10-01 PROCEDURE — 250N000013 HC RX MED GY IP 250 OP 250 PS 637: Performed by: INTERNAL MEDICINE

## 2020-10-01 PROCEDURE — 99220 PR INITIAL OBSERVATION CARE,LEVEL III: CPT | Performed by: INTERNAL MEDICINE

## 2020-10-01 PROCEDURE — 250N000013 HC RX MED GY IP 250 OP 250 PS 637: Performed by: EMERGENCY MEDICINE

## 2020-10-01 PROCEDURE — 250N000011 HC RX IP 250 OP 636: Performed by: EMERGENCY MEDICINE

## 2020-10-01 RX ORDER — TRAZODONE HYDROCHLORIDE 100 MG/1
100 TABLET ORAL
Status: DISCONTINUED | OUTPATIENT
Start: 2020-10-01 | End: 2020-10-01 | Stop reason: HOSPADM

## 2020-10-01 RX ORDER — POTASSIUM CHLORIDE 750 MG/1
10 TABLET, EXTENDED RELEASE ORAL DAILY
Status: ON HOLD | COMMUNITY
End: 2020-10-01

## 2020-10-01 RX ORDER — HYDROMORPHONE HYDROCHLORIDE 1 MG/ML
0.5 INJECTION, SOLUTION INTRAMUSCULAR; INTRAVENOUS; SUBCUTANEOUS ONCE
Status: COMPLETED | OUTPATIENT
Start: 2020-10-01 | End: 2020-10-01

## 2020-10-01 RX ORDER — QUETIAPINE FUMARATE 25 MG/1
25-50 TABLET, FILM COATED ORAL EVERY 4 HOURS PRN
Status: DISCONTINUED | OUTPATIENT
Start: 2020-10-01 | End: 2020-10-01 | Stop reason: HOSPADM

## 2020-10-01 RX ORDER — TAMSULOSIN HYDROCHLORIDE 0.4 MG/1
0.4 CAPSULE ORAL DAILY
Status: ON HOLD | COMMUNITY
End: 2020-11-18

## 2020-10-01 RX ORDER — POLYETHYLENE GLYCOL 3350 17 G/17G
17 POWDER, FOR SOLUTION ORAL DAILY PRN
Status: DISCONTINUED | OUTPATIENT
Start: 2020-10-01 | End: 2020-10-01 | Stop reason: HOSPADM

## 2020-10-01 RX ORDER — SPIRONOLACTONE 25 MG/1
25 TABLET ORAL DAILY
Status: ON HOLD | COMMUNITY
End: 2020-10-06

## 2020-10-01 RX ORDER — ONDANSETRON 2 MG/ML
4 INJECTION INTRAMUSCULAR; INTRAVENOUS EVERY 6 HOURS PRN
Status: DISCONTINUED | OUTPATIENT
Start: 2020-10-01 | End: 2020-10-01 | Stop reason: HOSPADM

## 2020-10-01 RX ORDER — POTASSIUM CHLORIDE 7.45 MG/ML
10 INJECTION INTRAVENOUS
Status: DISCONTINUED | OUTPATIENT
Start: 2020-10-01 | End: 2020-10-01 | Stop reason: HOSPADM

## 2020-10-01 RX ORDER — LORAZEPAM 2 MG/ML
2 INJECTION INTRAMUSCULAR
Status: DISCONTINUED | OUTPATIENT
Start: 2020-10-01 | End: 2020-10-01 | Stop reason: HOSPADM

## 2020-10-01 RX ORDER — PANTOPRAZOLE SODIUM 40 MG/1
40 TABLET, DELAYED RELEASE ORAL 2 TIMES DAILY
Status: ON HOLD | COMMUNITY
End: 2020-11-18

## 2020-10-01 RX ORDER — ACETAMINOPHEN 325 MG/1
650 TABLET ORAL EVERY 4 HOURS PRN
Status: DISCONTINUED | OUTPATIENT
Start: 2020-10-01 | End: 2020-10-01 | Stop reason: HOSPADM

## 2020-10-01 RX ORDER — FUROSEMIDE 20 MG
10 TABLET ORAL DAILY
Status: ON HOLD | COMMUNITY
End: 2020-11-18

## 2020-10-01 RX ORDER — FOLIC ACID 1 MG/1
1 TABLET ORAL DAILY
Status: ON HOLD | COMMUNITY
End: 2020-11-18

## 2020-10-01 RX ORDER — POTASSIUM CHLORIDE 1500 MG/1
20-40 TABLET, EXTENDED RELEASE ORAL
Status: DISCONTINUED | OUTPATIENT
Start: 2020-10-01 | End: 2020-10-01 | Stop reason: HOSPADM

## 2020-10-01 RX ORDER — LORAZEPAM 1 MG/1
1-2 TABLET ORAL EVERY 30 MIN PRN
Status: DISCONTINUED | OUTPATIENT
Start: 2020-10-01 | End: 2020-10-01 | Stop reason: HOSPADM

## 2020-10-01 RX ORDER — PROCHLORPERAZINE MALEATE 5 MG
5 TABLET ORAL EVERY 6 HOURS PRN
Status: DISCONTINUED | OUTPATIENT
Start: 2020-10-01 | End: 2020-10-01 | Stop reason: HOSPADM

## 2020-10-01 RX ORDER — ONDANSETRON 4 MG/1
4 TABLET, ORALLY DISINTEGRATING ORAL EVERY 6 HOURS PRN
Status: DISCONTINUED | OUTPATIENT
Start: 2020-10-01 | End: 2020-10-01 | Stop reason: HOSPADM

## 2020-10-01 RX ORDER — POTASSIUM CL/LIDO/0.9 % NACL 10MEQ/0.1L
10 INTRAVENOUS SOLUTION, PIGGYBACK (ML) INTRAVENOUS
Status: DISCONTINUED | OUTPATIENT
Start: 2020-10-01 | End: 2020-10-01 | Stop reason: HOSPADM

## 2020-10-01 RX ORDER — PROCHLORPERAZINE 25 MG
12.5 SUPPOSITORY, RECTAL RECTAL EVERY 12 HOURS PRN
Status: DISCONTINUED | OUTPATIENT
Start: 2020-10-01 | End: 2020-10-01 | Stop reason: HOSPADM

## 2020-10-01 RX ORDER — LANOLIN ALCOHOL/MO/W.PET/CERES
3 CREAM (GRAM) TOPICAL
Status: DISCONTINUED | OUTPATIENT
Start: 2020-10-01 | End: 2020-10-01 | Stop reason: HOSPADM

## 2020-10-01 RX ORDER — POTASSIUM CHLORIDE 1.5 G/1.58G
20-40 POWDER, FOR SOLUTION ORAL
Status: DISCONTINUED | OUTPATIENT
Start: 2020-10-01 | End: 2020-10-01 | Stop reason: HOSPADM

## 2020-10-01 RX ORDER — MAGNESIUM OXIDE 400 MG/1
400 TABLET ORAL DAILY
Status: ON HOLD | COMMUNITY
End: 2020-11-18

## 2020-10-01 RX ORDER — BISACODYL 10 MG
10 SUPPOSITORY, RECTAL RECTAL DAILY PRN
Status: DISCONTINUED | OUTPATIENT
Start: 2020-10-01 | End: 2020-10-01 | Stop reason: HOSPADM

## 2020-10-01 RX ORDER — MULTIPLE VITAMINS W/ MINERALS TAB 9MG-400MCG
1 TAB ORAL DAILY
Status: DISCONTINUED | OUTPATIENT
Start: 2020-10-01 | End: 2020-10-01 | Stop reason: HOSPADM

## 2020-10-01 RX ORDER — ACETAMINOPHEN 650 MG/1
650 SUPPOSITORY RECTAL EVERY 4 HOURS PRN
Status: DISCONTINUED | OUTPATIENT
Start: 2020-10-01 | End: 2020-10-01 | Stop reason: HOSPADM

## 2020-10-01 RX ORDER — NALOXONE HYDROCHLORIDE 0.4 MG/ML
.1-.4 INJECTION, SOLUTION INTRAMUSCULAR; INTRAVENOUS; SUBCUTANEOUS
Status: DISCONTINUED | OUTPATIENT
Start: 2020-10-01 | End: 2020-10-01 | Stop reason: HOSPADM

## 2020-10-01 RX ORDER — QUETIAPINE FUMARATE 100 MG/1
100 TABLET, FILM COATED ORAL 2 TIMES DAILY PRN
Status: ON HOLD | COMMUNITY
End: 2020-11-18

## 2020-10-01 RX ORDER — POTASSIUM CHLORIDE 29.8 MG/ML
20 INJECTION INTRAVENOUS
Status: DISCONTINUED | OUTPATIENT
Start: 2020-10-01 | End: 2020-10-01 | Stop reason: HOSPADM

## 2020-10-01 RX ORDER — AMOXICILLIN 250 MG
2 CAPSULE ORAL 2 TIMES DAILY PRN
Status: DISCONTINUED | OUTPATIENT
Start: 2020-10-01 | End: 2020-10-01 | Stop reason: HOSPADM

## 2020-10-01 RX ORDER — DIAZEPAM 5 MG
5 TABLET ORAL ONCE
Status: COMPLETED | OUTPATIENT
Start: 2020-10-01 | End: 2020-10-01

## 2020-10-01 RX ORDER — MAGNESIUM SULFATE HEPTAHYDRATE 40 MG/ML
4 INJECTION, SOLUTION INTRAVENOUS EVERY 4 HOURS PRN
Status: DISCONTINUED | OUTPATIENT
Start: 2020-10-01 | End: 2020-10-01 | Stop reason: HOSPADM

## 2020-10-01 RX ORDER — LORAZEPAM 2 MG/ML
1-2 INJECTION INTRAMUSCULAR EVERY 30 MIN PRN
Status: DISCONTINUED | OUTPATIENT
Start: 2020-10-01 | End: 2020-10-01 | Stop reason: HOSPADM

## 2020-10-01 RX ORDER — FOLIC ACID 1 MG/1
1 TABLET ORAL DAILY
Status: DISCONTINUED | OUTPATIENT
Start: 2020-10-01 | End: 2020-10-01 | Stop reason: HOSPADM

## 2020-10-01 RX ORDER — LIDOCAINE HYDROCHLORIDE 10 MG/ML
10 INJECTION, SOLUTION EPIDURAL; INFILTRATION; INTRACAUDAL; PERINEURAL ONCE
Status: COMPLETED | OUTPATIENT
Start: 2020-10-01 | End: 2020-10-01

## 2020-10-01 RX ORDER — AMOXICILLIN 250 MG
1 CAPSULE ORAL 2 TIMES DAILY PRN
Status: DISCONTINUED | OUTPATIENT
Start: 2020-10-01 | End: 2020-10-01 | Stop reason: HOSPADM

## 2020-10-01 RX ORDER — ATENOLOL 25 MG/1
25 TABLET ORAL DAILY
Status: ON HOLD | COMMUNITY
End: 2020-11-18

## 2020-10-01 RX ORDER — LANOLIN ALCOHOL/MO/W.PET/CERES
100 CREAM (GRAM) TOPICAL DAILY
Status: DISCONTINUED | OUTPATIENT
Start: 2020-10-01 | End: 2020-10-01 | Stop reason: HOSPADM

## 2020-10-01 RX ADMIN — FOLIC ACID 1 MG: 1 TABLET ORAL at 09:37

## 2020-10-01 RX ADMIN — MULTIPLE VITAMINS W/ MINERALS TAB 1 TABLET: TAB at 09:37

## 2020-10-01 RX ADMIN — QUETIAPINE 25 MG: 25 TABLET ORAL at 19:11

## 2020-10-01 RX ADMIN — QUETIAPINE 25 MG: 25 TABLET ORAL at 13:57

## 2020-10-01 RX ADMIN — THIAMINE HCL TAB 100 MG 100 MG: 100 TAB at 09:37

## 2020-10-01 RX ADMIN — VORTIOXETINE 10 MG: 10 TABLET, FILM COATED ORAL at 17:07

## 2020-10-01 RX ADMIN — HYDROMORPHONE HYDROCHLORIDE 0.5 MG: 1 INJECTION, SOLUTION INTRAMUSCULAR; INTRAVENOUS; SUBCUTANEOUS at 04:16

## 2020-10-01 RX ADMIN — ONDANSETRON 4 MG: 4 TABLET, ORALLY DISINTEGRATING ORAL at 09:37

## 2020-10-01 RX ADMIN — LIDOCAINE HYDROCHLORIDE 7 ML: 10 INJECTION, SOLUTION EPIDURAL; INFILTRATION; INTRACAUDAL; PERINEURAL at 10:18

## 2020-10-01 RX ADMIN — DIAZEPAM 5 MG: 5 TABLET ORAL at 04:15

## 2020-10-01 NOTE — PLAN OF CARE
DATE & TIME: 10/1/2020 7744-3440   Cognitive Concerns/ Orientation : A&OX4   BEHAVIOR & AGGRESSION TOOL COLOR: Green  CIWA SCORE: 7, 7  ABNL VS/O2: VSS  MOBILITY: SBA  PAIN MANAGMENT: C/O back pain.  Declined interventions at this time  DIET: Low NA  BOWEL/BLADDER: Continent  ABNL LAB/BG: WNL  DRAIN/DEVICES: IV SL  TELEMETRY RHYTHM: SB  SKIN: Ecchymosis, scabs, flaky  TESTS/PROCEDURES: Thoracentesis completed today.  850 L off.  D/C DAY/GOALS/PLACE: 1-2 days  OTHER IMPORTANT INFO: Suicide and 1:1 sit discharge per psych. Did not verbalize suicidal thoughts this shift.  Thoracentesis done this shift.  850L off.  Bandage C, D, I.   Abd rounded.  Firm on RUQ.  MD/RN ROUNDING SIGNED OFF D/E SHIFT: Y  COMMIT TO SIT DONE AND SIGNED OFF Y

## 2020-10-01 NOTE — DISCHARGE SUMMARY
[facility]  Hospitalist Discharge Summary      Date of Admission:  9/30/2020  Date of Discharge:  10/1/2020  Discharging Provider: Jeffrey Villagomez MD      Discharge Diagnoses   Suicidal gesture  Anxiety and depression  Intentional overdose  Acute alcohol intoxication with history of alcohol dependence  Alcoholic cirrhosis of the liver with ascites  Chronic kidney disease stage III  Anemia  BPH    Follow-ups Needed After Discharge   Follow-up Appointments     Follow-up and recommended labs and tests       Follow up with primary care provider, FERNANDA BRANTLEY, within 7 days   for hospital follow- up.  No follow up labs or test are needed.           Unresulted Labs Ordered in the Past 30 Days of this Admission     Date and Time Order Name Status Description    10/1/2020 0859 Cell count with differential fluid In process     9/30/2020 5047 Asymptomatic COVID-19 Virus (Coronavirus) by PCR In process       These results will be followed up by PCP    Discharge Disposition   Discharged to home  Condition at discharge: Stable    Hospital Course   Patient is a 65-year-old male with a history of severe alcohol use disorder and complications thereof including cirrhosis.  He presented to the hospital after suicidal gesture when he took 46 of his atenolol while on a drinking binge.  He was seen by psychiatry while inpatient and felt to have a low risk for suicide and cleared for discharge.    Additionally, he underwent a therapeutic paracentesis due to abdominal ascites.  It appears he is undergone these every several weeks for the past several months.    At time of discharge she was ambulating easily in the hallways, clinically sober, and had a plan for cessation from alcohol.    Consultations This Hospital Stay   PSYCHIATRY IP CONSULT    Code Status   No CPR- Do NOT Intubate    Time Spent on this Encounter   I, Jeffrey Villagomez MD, personally saw the patient today and spent less than or equal  to 30 minutes discharging this patient.       Jeffrey Villagomez MD  [facility]  ______________________________________________________________________    Physical Exam   Vital Signs: Temp: 97.6  F (36.4  C) Temp src: Oral BP: 114/60 Pulse: (!) 48   Resp: 16 SpO2: 96 % O2 Device: None (Room air)    Weight: 0 lbs 0 oz  Constitutional: Awake, alert, cooperative, no apparent cardiopulmonary distress.  Eyes: Conjunctiva and pupils examined and normal.  HEENT: Moist mucous membranes, normal dentition.  Respiratory: Clear to auscultation bilaterally, no crackles or wheezing.  Cardiovascular: Regular rate and rhythm, normal S1 and S2, and no murmur noted.  GI: Soft, non-distended, non-tender, normal bowel sounds.  Lymph/Hematologic: No anterior cervical or supraclavicular adenopathy.  Skin: No rashes, no cyanosis, no edema noted on exposed skin.  Musculoskeletal: No joint swelling, erythema or tenderness. No gross bony abnormalities  Neurologic: Cranial nerves 2-12 grossly intact, normal strength and sensation.  Psychiatric: Alert, oriented to person, place and time, no obvious anxiety or depression.         Primary Care Physician   FERNANDA BRANTLEY    Discharge Orders      Reason for your hospital stay    You are in the hospital due to alcohol intoxication and a suicidal gesture.  You should have complete cessation from alcohol use in the future.     Follow-up and recommended labs and tests     Follow up with primary care provider, FERNANDA BRANTLEY, within 7 days for hospital follow- up.  No follow up labs or test are needed.     Activity    Your activity upon discharge: activity as tolerated     No CPR- Do NOT Intubate     Diet    Follow this diet upon discharge: Orders Placed This Encounter      Combination Diet 2 gm NA Diet       Significant Results and Procedures   Results for orders placed or performed during the hospital encounter of 09/30/20    Paracentesis    Narrative     ULTRASOUND PARACENTESIS 10/1/2020 10:51 AM     HISTORY: High volume paracentesis with or without diagnostic fluid  analysis with labs to be drawn if ordered. Total paracentesis volume  as much as possible.    FINDINGS: Ultrasound was used to evaluate for the presence and best  approach for paracentesis. Written and oral informed consent was  obtained. A pause for the cause procedure to verify the correct  patient and correct procedure. The skin overlying the right lower  quadrant was prepped and draped in the usual sterile fashion. The  subcutaneous tissues were anesthetized with 7mL 1% lidocaine. A  catheter was advanced into the peritoneal space and 850 L of  straw  colored fluid was drained. There were no immediate complications.  Ultrasound images were permanently stored.  Patient left the  ultrasound suite in satisfactory condition.      Impression    IMPRESSION: Technically successful paracentesis without immediate  complications.       Discharge Medications   Current Discharge Medication List      CONTINUE these medications which have NOT CHANGED    Details   atenolol (TENORMIN) 25 MG tablet Take 25 mg by mouth daily      fluticasone-vilanterol (BREO ELLIPTA) 200-25 MCG/INH inhaler Inhale 1 puff into the lungs daily as needed Patient stated that he takes as needed when smoking      folic acid (FOLVITE) 1 MG tablet Take 1 mg by mouth daily      furosemide (LASIX) 20 MG tablet Take 10 mg by mouth daily      magnesium oxide (MAG-OX) 400 MG tablet Take 400 mg by mouth daily      multivitamin w/minerals (THERA-VIT-M) tablet Take 1 tablet by mouth daily  Qty: 30 each, Refills: 0    Associated Diagnoses: Alcoholic intoxication without complication (H)      pantoprazole (PROTONIX) 40 MG EC tablet Take 40 mg by mouth daily      QUEtiapine (SEROQUEL) 100 MG tablet Take  mg by mouth every 6 hours as needed (moderate agitation)      spironolactone (ALDACTONE) 25 MG tablet Take 25 mg by mouth daily      tamsulosin  (FLOMAX) 0.4 MG capsule Take 0.4 mg by mouth daily      traZODone (DESYREL) 100 MG tablet Take 1 tablet (100 mg) by mouth At Bedtime  Qty: 30 tablet, Refills: 0    Associated Diagnoses: Depression, unspecified depression type      vortioxetine (TRINTELLIX) 10 MG tablet Take 1 tablet (10 mg) by mouth daily  Qty: 30 tablet, Refills: 0    Associated Diagnoses: Severe episode of recurrent major depressive disorder, without psychotic features (H)           Allergies   Allergies   Allergen Reactions     Amlodipine Swelling     Lisinopril      Other reaction(s): Angioedema  Mouth and tongue swelling   Mouth and tongue swelling

## 2020-10-01 NOTE — ED NOTES
Bed: BH1  Expected date:   Expected time:   Means of arrival:   Comments:  432 65m suicidal ETOH eta 15

## 2020-10-01 NOTE — PROGRESS NOTES
RADIOLOGY PROCEDURE NOTE  Patient name: Jim Richard  MRN: [id number]  : 1955    Pre-procedure diagnosis: Ascites  Post-procedure diagnosis: Same    Procedure Date/Time: 2020  10:23 AM  Procedure: Paracentesis  Estimated blood loss: None  Specimen(s) collected with description: Ascites.  The patient tolerated the procedure well with no immediate complications.    See imaging dictation for procedural details and findings.    Provider name: Sebastien Pereira MD  Assistant(s):None

## 2020-10-01 NOTE — ED NOTES
"Pt presents to ED via EMS for evaluation following alcohol intoxication and suicide attempt. Per EMS, pt called 911 today reporting suicide attempt. Pt reported taking pills and drinking alcohol. Per EMS, pill bottles on the floor by pt included Lasix, Tamsulosin, and Trazodone. Per EMS, only a few pills missing of each. Per EMS, pt combative on scene denied consumption of pills and request not to come to ED. ADRIANA placed on Pt by Jesus FRIEDMAN. Pt reporting to fellow RN \"I took a handful of my pills to end my life.\"   "

## 2020-10-01 NOTE — PHARMACY-ADMISSION MEDICATION HISTORY
Admission medication history interview status for the 9/30/2020  admission is complete. See EPIC admission navigator for prior to admission medications     Medication history source reliability:Good    Actions taken by pharmacist (provider contacted, etc): verified medications with [facility] pharmacist and with patient     Additional medication history information not noted on PTA med list : None    Medication reconciliation/reorder completed by provider prior to medication history? No    Time spent in this activity: 1 hour    Prior to Admission medications    Medication Sig Last Dose Taking? Auth Provider   atenolol (TENORMIN) 25 MG tablet Take 25 mg by mouth daily 9/30/2020 at am Yes Unknown, Entered By History   fluticasone-vilanterol (BREO ELLIPTA) 200-25 MCG/INH inhaler Inhale 1 puff into the lungs daily as needed Patient stated that he takes as needed when smoking prn Yes Unknown, Entered By History   folic acid (FOLVITE) 1 MG tablet Take 1 mg by mouth daily 9/30/2020 at am Yes Unknown, Entered By History   furosemide (LASIX) 20 MG tablet Take 10 mg by mouth daily 9/30/2020 at am Yes Unknown, Entered By History   magnesium oxide (MAG-OX) 400 MG tablet Take 400 mg by mouth daily 9/30/2020 Yes Unknown, Entered By History   multivitamin w/minerals (THERA-VIT-M) tablet Take 1 tablet by mouth daily 9/30/2020 at am Yes Jude Ledesma MD   pantoprazole (PROTONIX) 40 MG EC tablet Take 40 mg by mouth daily 9/30/2020 Yes Unknown, Entered By History   QUEtiapine (SEROQUEL) 100 MG tablet Take  mg by mouth every 6 hours as needed (moderate agitation) prn Yes Unknown, Entered By History   spironolactone (ALDACTONE) 25 MG tablet Take 25 mg by mouth daily 9/30/2020 at am Yes Unknown, Entered By History   tamsulosin (FLOMAX) 0.4 MG capsule Take 0.4 mg by mouth daily 9/29/2020 at am Yes Unknown, Entered By History   traZODone (DESYREL) 100 MG tablet Take 1 tablet (100 mg) by mouth At Bedtime 9/30/2020 at pm Yes  Jude Ledesma MD   vortioxetine (TRINTELLIX) 10 MG tablet Take 1 tablet (10 mg) by mouth daily 9/30/2020 at am Yes Jude Ledesma MD

## 2020-10-01 NOTE — H&P
"[facility]    History and Physical - Hospitalist Service       Date of Admission:  9/30/2020    Assessment & Plan   Jim Richard is a 65 year old male admitted on 9/30/2020. He presents to the emergency department after contacting 911 in the setting of suicidal gesture by polypharmacy overdose    Suicidal gesture: Patient reports that he took \"a few\" pills last night while drinking alcohol.  Did not take his entire supplies of pills, and actually subsequently called 911 to report his overdose.  Primary outpatient psychiatrist is Dr. Owens  Anxiety and depression:  -Prior to admission anxiolytic/antidepressant/mood stabilizers currently on hold given intentional overdose.  Resume as per psychiatry  -Bedside sitter  -Suicide precautions  -Psychiatry consulted.  -Patient remains full CODE STATUS.  Note that historically he has decided DNR/DNI status.  Discussed with patient that he will remain full code until cleared by psychiatry to resume DNR/DNI status given presentation with depression and suicidal gesture.  -Patient is not currently on a 72-hour hold.  If he desires to leave prior to being cleared by psychiatry, security should be called and a 72-hour hold should be initiated.  Discussed with patient on admission, and he is aware that he is not allowed to leave until cleared by psychiatry.    Intentional overdose: Patient reports that he took several pills of beta-blocker, tamsulosin.  He does not believe he took any Celexa, which was a recent prescription.  Case was discussed with poison control by Dr. Dumas in the emergency department and recommendation was for ongoing monitoring for potential Celexa toxicity which could result in seizures.  Anticipate 6-12 additional hours of observation for this.  -Cardiac telemetry  -Seizure precautions  -Depression/suicidal ideation evaluation as above   -PRN Ativan is available for seizures if needed    Acute alcohol intoxication " "with history of alcohol dependence: Patient has been through treatment multiple times in the past.  He reports 1 week of drinking after recent discharge from 90-day treatment.  He tells me that he went back to drinking to \"test the durham.\"  He tells me now that he is aware that he cannot go back to drinking.  -Daily thiamine, folate, multivitamin  -Psychiatry consulted  -Continue to encourage alcohol cessation  -[facility] protocol  -PRN Ativan if needed    Alcoholic cirrhosis of the liver with ascites: History of GI bleed.  Grade 1 esophageal varices with portal gastropathy  -Resume prior to admission diuretic therapies when reconciled by pharmacy  -Consider therapeutic tap during hospitalization, though patient's abdomen is not currently tense    Chronic kidney disease stage III: Baseline creatinine 1.3-1.5.  Currently 1.59.  -Resume prior to admission diuretics when reconciled by pharmacy    Anemia: Hemoglobin of 10.4 in the setting of alcohol abuse, iron deficiency, relatively recent possible GI bleed during hospitalization in June.  Hemoglobin is actually improved from the 9 range.  -Monitor for any evidence of bleeding    BPH:  -Resume prior to mission Flomax when reconciled by pharmacy       Diet:  Low-sodium diet  DVT Prophylaxis: Ambulate every shift  Leger Catheter: not present  Code Status:  Full code.  As above, patient desires DNR/DNI status, though will await psychiatry recommendations prior to changing CODE STATUS.         Disposition Plan   Expected discharge: Likely tomorrow, recommended to Inpatient psychiatry once Patient has demonstrated clinical stability for an additional 6 to 12 hours monitoring.  Patient has been admitted to observation status, though will need to be transition to inpatient status if he develops alcohol withdrawal..  Entered: Raul Lim MD 10/01/2020, 5:26 AM     The patient's care was discussed with the Patient, Dr Dumas in the ER    Raul Lim MD  [facility] " "[facility]    ______________________________________________________________________    Chief Complaint   Self-injurious behavior, attempted suicide    History is obtained from the patient, discussion Dr. Dumas in the emergency [place], chart review    History of Present Illness   Jim Richard is a 65 year old male who presents to the emergency department after calling 911 following suicide attempt.  Last night, patient reports that he took several tablets of his atenolol, Seroquel.  He does not believe he took any Cymbalta in an attempt to kill himself.  He reports that he only took a few of each tab, not an entire bottle, and went forward with drinking alcohol.  Subsequently called 911.  Has a history of depression and suicidal ideation.  Patient tells me that his suicidal ideation is especially notable when he goes back to drinking.    Recently completed 90-day treatment at [place].  Following discharge, patient decided to \"test the water\" with alcohol and has been drinking heavily for the past 1 week.  He tells me that when he drinks, he becomes depressed, and this led to tonight's suicidal gesture/suicide attempt.  Patient has alcoholic cirrhosis with grade 1 esophageal varices, last EGD in June.    Patient has had no fevers, no chills.  Currently feels safe.  He is frustrated with himself or going back to drinking and tells me that he now knows he cannot go back to drinking, he references his abdominal distention with ascites as the reason why he cannot go back to drinking.    Patient was initially on a health officer hold in the emergency department.  There was a plan to place him on a 72-hour hold as he was initially not willing to stay or at least described this to the emergency department provider.  In meeting with patient, he is familiar with a 72-hour hold and is willing to await evaluation by psychiatry.  He is also aware that he will be placed on a 72-hour hold if he attempts to leave prior " "to being cleared by psychiatry.  He is calm, cooperative.    Given patient's multiple medications, there was some concern that he may have taken additional tablets of a recent prescription of Cymbalta.  Patient tells me that this was not the case, though poison control recommended an additional 6 to 12 hours of observation to ensure no development of seizures.  Patient tells me that he has resting bradycardia typically in the 50s to 60s range on his atenolol.  Heart rate is currently in the 50s range, occasionally dips to the upper 40s, though patient asymptomatic with this.    Patient has chronic back pain.  He reports this is longstanding and unchanged.    Abdomen is distended, and patient reports some mild discomfort, though tells me that it is \"not too bad.\"  His abdomen is not tense with ascites, though he has required paracentesis in the past.  He does feel that his ascites is increased since he went back to drinking in the past week.  As above, he references this as the reason he cannot go back to drinking.    Review of Systems    The 10 point Review of Systems is negative other than noted in the HPI or here.  No fevers  No cough      Past Medical History    I have reviewed this patient's medical history and updated it with pertinent information if needed.   Past Medical History:   Diagnosis Date     Alcoholism (H) 1/14/2013    had treatment at [facility]     Anxiety      Coronary artery disease 09/09/2014    Nuclear stress test with slight fixed defect inferiorly, no ischemia     Depression     w/anxiety     Esophageal varices with bleeding 04/28/2018    6 varices banded on EGD     Heart attack (H)      Hepatomegaly     Fatty liver, chronic alcoholic     Hyperlipemia      Hypertension      JANIS on CPAP      Peripheral vascular disease (H)      PVD (peripheral vascular disease) (H)      SDH (subdural hematoma) (H) 04/26/2018    Right side, resolved spontaneously     Spinal stenosis        Past Surgical " History   I have reviewed this patient's surgical history and updated it with pertinent information if needed.  Past Surgical History:   Procedure Laterality Date     APPENDECTOMY       BYPASS GRAFT FEMOROPOPLITEAL  6/12/2012    Procedure: BYPASS GRAFT FEMOROPOPLITEAL;  LEFT FEMORAL TO ABOVE KNEE POPLITEAL BYPASS, ENDARTERECTOMY OF SFA AND PF ARTERIES, LEFT EXTERNAL ILIAC TO COMMON FEMORAL INTERPOSITION GRAFT;  Surgeon: Damir Roberts MD;  Location:  OR     COLONOSCOPY       COLONOSCOPY N/A 8/8/2019    Procedure: COLONOSCOPY;  Surgeon: Pavan Arguello MD;  Location:  GI     COLONOSCOPY N/A 3/10/2020    Procedure: COLONOSCOPY;  Surgeon: Rosendo Shaw MD;  Location:  GI     ENDARTERECTOMY FEMORAL  6/12/2012    Procedure: ENDARTERECTOMY FEMORAL;;  Surgeon: Damir Roberts MD;  Location:  OR     ESOPHAGOSCOPY, GASTROSCOPY, DUODENOSCOPY (EGD), COMBINED N/A 3/22/2018    Procedure: COMBINED ESOPHAGOSCOPY, GASTROSCOPY, DUODENOSCOPY (EGD);  ESOPHAGOSCOPY, GASTROSCOPY, DUODENOSOCPY.;  Surgeon: Valeri Pruitt MD;  Location:  OR     ESOPHAGOSCOPY, GASTROSCOPY, DUODENOSCOPY (EGD), COMBINED N/A 9/29/2018    Procedure: COMBINED ESOPHAGOSCOPY, GASTROSCOPY, DUODENOSCOPY (EGD);;  Surgeon: Rosendo Shaw MD;  Location:  GI     ESOPHAGOSCOPY, GASTROSCOPY, DUODENOSCOPY (EGD), COMBINED N/A 8/2/2019    Procedure: ESOPHAGOGASTRODUODENOSCOPY (EGD);  Surgeon: Pavan Arguello MD;  Location:  GI     ESOPHAGOSCOPY, GASTROSCOPY, DUODENOSCOPY (EGD), COMBINED N/A 9/25/2019    Procedure: ESOPHAGOGASTRODUODENOSCOPY (EGD);  Surgeon: Pavan Arguello MD;  Location:  GI     ESOPHAGOSCOPY, GASTROSCOPY, DUODENOSCOPY (EGD), COMBINED N/A 3/8/2020    Procedure: ESOPHAGOGASTRODUODENOSCOPY (EGD);  Surgeon: Rosendo Shaw MD;  Location:  GI     ESOPHAGOSCOPY, GASTROSCOPY, DUODENOSCOPY (EGD), COMBINED N/A 6/11/2020    Procedure: ESOPHAGOGASTRODUODENOSCOPY (EGD);  Surgeon: Rosendo Shaw MD;   Location:  GI     HERNIA REPAIR  2017    Abdominal     LAMINECTOMY, FUSION LUMBAR THREE+ LEVEL, COMBINED N/A 9/22/2014    Procedure: COMBINED LAMINECTOMY, FUSION LUMBAR THREE+ LEVEL;  Surgeon: Sebastien Pruitt MD;  Location:  OR     TONSILLECTOMY & ADENOIDECTOMY         Social History   I have reviewed this patient's social history and updated it with pertinent information if needed.  Social History     Tobacco Use     Smoking status: Current Every Day Smoker     Packs/day: 1.00     Types: Cigarettes     Smokeless tobacco: Never Used     Tobacco comment: Chantix caused nightmares   Substance Use Topics     Alcohol use: Not Currently     Comment: last drink 6/7/2020     Drug use: No       Family History   I have reviewed this patient's family history and updated it with pertinent information if needed.  Family History   Problem Relation Age of Onset     Mental Illness Son      Coronary Artery Disease Early Onset Mother      Substance Abuse Father      Lung Cancer Father      Substance Abuse Brother      Unknown/Adopted No family hx of      Depression No family hx of      Anxiety Disorder No family hx of      Schizophrenia No family hx of      Bipolar Disorder No family hx of      Suicide No family hx of      Dementia No family hx of      [facility] Disease No family hx of      Parkinsonism No family hx of      Autism Spectrum Disorder No family hx of      Intellectual Disability (Mental Retardation) No family hx of        Prior to Admission Medications   Prior to Admission Medications   Prescriptions Last Dose Informant Patient Reported? Taking?   furosemide (LASIX) 20 MG tablet   No No   Sig: Take 1 tablet (20 mg) by mouth daily   multivitamin w/minerals (THERA-VIT-M) tablet   No No   Sig: Take 1 tablet by mouth daily   traZODone (DESYREL) 100 MG tablet   No No   Sig: Take 1 tablet (100 mg) by mouth At Bedtime   vortioxetine (TRINTELLIX) 10 MG tablet   No No   Sig: Take 1 tablet (10 mg) by mouth daily   Patient  taking differently: Take 15 mg by mouth daily       Facility-Administered Medications: None     Allergies   Allergies   Allergen Reactions     Amlodipine Swelling     Lisinopril      Other reaction(s): Angioedema  Mouth and tongue swelling   Mouth and tongue swelling          Physical Exam   Vital Signs: Temp: 98.2  F (36.8  C) Temp src: Oral BP: 123/51 Pulse: 53   Resp: 10 SpO2: 94 % O2 Device: None (Room air)        General Appearance: Comfortable appearing 65-year-old male sitting in bed.  Moderate to more significant discomfort when attempting to sit up on his own related to chronic back pain.  Eyes: No scleral icterus or injection  HEENT: Normocephalic, atraumatic  Respiratory: Breath sounds clear bilaterally to auscultation  Cardiovascular: Regular rhythm, heart rate currently in the mid 50s.  No appreciable murmur.  GI: Abdomen obese, slightly protuberant.  Not tense with ascites, though flank bulging consistent with ascites.  Based on exam, do not believe patient requires paracentesis currently.  No peritoneal signs.  No palpable mass  Lymph/Hematologic: No lower extremity edema  Genitourinary: Not examined  Skin: No rashes or petechiae appreciated  Musculoskeletal: Muscular tone intact in all extremities.  Neurologic: Alert, conversant, appropriate conversation.  Mental status grossly intact.  Psychiatric: Affect is quite blunted.  Very pleasant, however    Data   Data reviewed today: I reviewed all medications, new labs and imaging results over the last 24 hours. I personally reviewed no images or EKG's today.    Recent Labs   Lab 09/30/20  2138   WBC 6.9   HGB 10.4*   MCV 90      INR 1.18*      POTASSIUM 3.8   CHLORIDE 110*   CO2 20   BUN 29   CR 1.59*   ANIONGAP 9   KEV 8.1*   GLC 75   ALBUMIN 3.2*   PROTTOTAL 7.4   BILITOTAL 0.7   ALKPHOS 173*   ALT 27   AST 40   TROPI <0.015

## 2020-10-01 NOTE — ED PROVIDER NOTES
History     Chief Complaint:  Suicide Attempt and Alcohol Intoxication    HPI   Jim Richard, a current every day smoker, is a 65 year old male with a history of depression with suicidal ideation, anxiety, hypertension, and hyperlipidemia who presents via EMS for evaluation secondary to a suicide attempt and alcohol intoxication. Per EMS, the patient called 911 reporting attempted suicide via taking pills and drinking alcohol. When EMS arrived they found pill bottles for Lasix, Tamsulosin, and Trazodone with only a few pills missing on the floor by the patient. The patient denied consumption of pills and requested not to come to the ED. Per patient report, about 4 hours ago the patient took a handful of pills including a new prescription of atenolol in an attempt to commit suicide. In the ED, he complains of chest pain and right toe pain. He denies any triggers tonight and any other complaints. Of note, he gets paracentesis every few months.     Allergies:  Amlodipine   Lisinopril      Medications:    Lasix   Trazodone   Vortioxetine     Past Medical History:    GI bleed  Depression with suicidal ideation  Severe Alcohol dependence with withdrawal  Alcoholic cirrhosis of liver with ascites   Anemia, iron deficiency  Chronic back pain  MDD  Alcoholic hepatitis without ascites  Esophageal varices  Essential hypertension  COPD  Smoker  JANIS on CPAP  Suicidal ideation  Depression  Spinal stenosis, lumbar region, with neurogenic claudication  Tremor  Anxiety and depression  Backache  Hyperlipidemia  PAD  Alcoholism   CAD  Heart attack  Hepatomegaly   PVD  SDH    Past Surgical History:    Appendectomy   Bypass graft femoropopliteal   Endarterectomy femoral   EGD, combined x6  Abdominal hernia repair   Laminectomy, fusion lumbar three+ level, combined  Tonsillectomy and Adenoidectomy     Family History:    CAD early onset  Substance abuse   Lung cancer    Social History:  Smoking status- current every day  smoker  Alcohol use- not currently   Drug use- no   Marital Status:       Review of Systems   Reason unable to perform ROS: alcohol intoxication.       Physical Exam     Patient Vitals for the past 24 hrs:   BP Temp Temp src Pulse SpO2   09/30/20 2147 -- -- -- (!) 48 99 %   09/30/20 2134 129/66 98.2  F (36.8  C) Oral 50 98 %   09/30/20 2130 -- -- -- -- 97 %     Physical Exam    Constitutional:  Appears well-developed and well-nourished. Cooperative. Appears intoxicated, smells of alcohol, slurred speech  HENT:   Head:    Atraumatic.   Mouth/Throat:   Oropharynx is without erythema or exudate and mucous     membranes are moist.   Eyes:    Conjunctivae normal and EOM are normal.      Pupils are equal, round, and reactive to light.   Neck:    Normal range of motion. Neck supple.   Cardiovascular:  Normal rate, regular rhythm, normal heart sounds and radial and    dorsalis pedis pulses are 2+ and symmetric.    Pulmonary/Chest:  Effort normal and breath sounds normal.   Abdominal:   Distended with fluid wave.  Bowel sounds are present     Mild diffuse tenderness, exam for organomegaly limited by ascites. No rebound. No guarding  Musculoskeletal:  Normal range of motion.  Trace bilateral lower extremity edema and no tenderness, other than mild tenderness over the dorsum of the right great toe.  No swelling or ecchymosis.  Normal range of motion..   Neurological:  Alert. Normal strength. No cranial nerve deficit. GCS 15  Skin:    Skin is warm and dry. Skin tear right palmar mid forearm with some surrounding ecchymosis superficial skin tear on the dorsum of the left hand.  Scattered areas of ecchymosis in various stages of healing on bilateral upper extremities.  Psychiatric:   Appears intoxicated, poor eye contact, depressed mood and flat affect    Emergency Department Course     ECG (22:20:47):  Rate 46 bpm. WY interval 158. QRS duration 138. QT/QTc 562. P-R-T axes 68 -38 11. Sinus bradycardia. Left axis  deviation. Right bundle branch block. Abnormal ECG. Rate slowed. Interpreted at 2224 by Dagoberto Garcia MD.     ECG (22:39:32):  Rate 46 bpm. SD interval 156. QRS duration 142. QT/QTc 560/490. P-R-T axes 48 -24 36. Sinus bradycardia. Right bundle branch block. Septal infarct, age undetermined. Abnormal EKG. Interpreted at 2241 by Dagoberto Garcia MD.     Laboratory:  Laboratory findings were communicated with the patient who voiced understanding of the findings.    CBC: RBC Count 3.52 (L), HGB 10.4 (L), Hematocrit 31.5 (L), RDW 15.1 (H), o/w WNL (WBC 6.9, )  CMP: Chloride: 110 (H), GFR: 44 (L), Albumin: 3.2 (L), Alkaline Phosphatase: 173 (H), o/w WNL (Creatinine: 1.59 (H))    Salicylate level: 3  Acetaminophen level: <2   Alcohol ethyl: 0.32 (H)    Magnesium: 1.8  Troponin (Collected 2138): <0.015    INR: 1.18 (H)      Asymptomatic COVID-19 Virus by PCR: pending      Interventions:  2245 GI Cocktail - Maalox 15 mL, Viscous Lidocaine 15 mL, 30 mL suspension PO     2246 NS 1L IV Bolus     2247 Pepcid 20 mg IV    2300 sodium chloride 0.9% 1000mL with INFUVITE 10 mL, thiamine 100mg, folic acid 1mg, magnesium sulfate 2g IV      0415 Valium 5 mg PO    0416 Dilaudid 0.5 mg IV     Emergency Department Course:  Past medical records, nursing notes, and vitals reviewed.    2144 IV was inserted and blood was drawn for laboratory testing, results above.    2200 I performed an exam of the patient as documented above.     2226 EKG obtained in the ED, see results above.     2230 I spoke to poison control regarding the patient's case     2239 Repeat EKG obtained in the ED, see results above.     2345 I rechecked the patient and discussed the results of their workup thus far.     0420 I rechecked the patient     0520 I spoke to Dr. Lim of the hospitalist service who accepts care of the patient.      Findings and plan explained to the Patient who consents to admission. Discussed the patient with Dr. Lim, who will admit  the patient to a [facility] bed for further monitoring, evaluation, and treatment.    Impression & Plan     Covid-19  Jim Richard was evaluated during a global COVID-19 pandemic, which necessitated consideration that the patient might be at risk for infection with the SARS-CoV-2 virus that causes COVID-19.   Applicable protocols for evaluation were followed during the patient's care.   COVID-19 was considered as part of the patient's evaluation. The plan for testing is:  a test was obtained during this visit.     Medical Decision Making:    She presents after drinking a large volume of alcohol and ingesting handfuls of his pills.  He cannot tell me exactly which of his medicines he took.  He thought he took his psychiatric medicines, and his newly prescribed atenolol.  He said he just did this with an attempt to kill himself about 4 hours prior to arriving here.    He initially denied chest pain difficulty breathing or abdominal pain.  He said he fell and he has skin tears to his left forearm and right hand which were cleaned and dressed.  He complained of pain in his right great toe.  I do not see obvious deformity, swelling or ecchymosis, but ordered an x-ray.  The patient refused the x-ray.  On reexamination he said it does not hurt anymore not wish the film.  He did not hit his head.  He denies head pain, and has no neurologic deficits.  I did not think CT imaging of the brain was needed.    EKG showed a sinus bradycardia.  Laboratory testing returned looking fairly unremarkable aside from an elevated serum alcohol at 0.32.  INR is mildly elevated at 1.18.  The patient denies any bleeding problems.  Creatinine is elevated, but improved compared to his most recent testing.  He is also noted to have an elevated alkaline phosphatase.  This is likely because of his heavy recent drinking.  He has a history of alcoholic cirrhosis and ascites.  There is no sign of coingestions with negative salicylate and Tylenol  levels.  Patient has a stable anemia.    Regarding his overdoses, he was bradycardic with the atenolol ingestion.  Poison control said this would have peaked at 4 hours.  His lowest heart rate was in the mid 40s.  Blood pressures have remained stable.  Danger from the atenolol overdose should be passed.  Of the list of other medications Poison control was most concerned about Cymbalta, which does not peak for 8 to 10 hours following ingestion and pose a seizure risk.  They recommended for extended monitoring for this.  Patient is also at risk for alcohol withdrawal, which would also increase risk for seizures.  Seizure precautions were undertaken in the ED.  After several hours of observation he developed increasing tremor and was given some Valium.    Patient complained of chest pain midway through his stay.  EKG was repeated and did not show any ischemic-looking changes.  Troponin was negative.  Chest pain improved with GI cocktail and Pepcid.  Later in his stay he also complained of worsening abdominal pain.  He feels like his ascites are progressing.  His last paracentesis was a couple weeks ago he is due for another.  There are no peritoneal signs on exam.  I doubt SBP.  He does not have fever or elevated white cell count.  I do not think emergent imaging or tap is indicated at this time.    I talked with Dr. Lim on-call for the hospitalist service who agreed accept patient for admission.  Patient will have a sitter, as he is suicidal.  Evaluation from psychiatry regarding his depression and suicidal ideation/attempt.    I discussed test results and plan for hospitalization with the patient and he voices understanding.        Diagnosis:    ICD-10-CM    1. Overdose, intentional self-harm, initial encounter (H)  T50.902A Asymptomatic COVID-19 Virus (Coronavirus) by PCR   2. Alcoholic intoxication without complication (H)  F10.920    3. Depression, unspecified depression type  F32.9    4. Suicidal ideation   R45.851    5. Bradycardia  R00.1    6. Abdominal pain, generalized  R10.84    7. Alcoholic cirrhosis of liver with ascites (H)  K70.31      Disposition:  Admitted to Dr. Lim.    Scribe Disclosure:  I, Kaitlin Gonzalez, am serving as a scribe at 10:02 PM on 9/30/2020 to document services personally performed by Dagoberto Garcia MD based on my observations and the provider's statements to me.        Dagoberto Garcia MD  10/01/20 0736

## 2020-10-01 NOTE — ED NOTES
"[facility]  ED Nurse Handoff Report    ED Chief complaint: Suicide Attempt and Alcohol Intoxication      ED Diagnosis:   Final diagnoses:   Overdose, intentional self-harm, initial encounter (H)   Alcoholic intoxication without complication (H)   Depression, unspecified depression type   Suicidal ideation   Bradycardia       Code Status: Full Code    Allergies:   Allergies   Allergen Reactions     Amlodipine Swelling     Lisinopril      Other reaction(s): Angioedema  Mouth and tongue swelling   Mouth and tongue swelling          Patient Story: pt with hx of alcoholism called 911 with suicidal ideations - pt reports taking \"handful\" of his pills along with alcohol tonight four hours prior to arrival in ED. Pt unsure which pills - ems found bottles of lasix, flomax and trazodone and atenolol. Pt has been getting frequent paracentesis - last one 9/23.   Focused Assessment:  Bradycardic on arrival. Pt is calm, but has been refusing simple commands and refusing to answer questions about meds or how much he drank. Pt c/o chest pain and toe pain - refusing xray of toe. Pt came in with skin tears to b/l arms that were dressed in ED. Pt is alert to person, time and place.     Treatments and/or interventions provided: labs, fluids, banana bag. Seizure precautions in case of withdrawal or in case pt overdosed on cymbalta - needs 24 hour monitoring. Pt is on a hold.  Patient's response to treatments and/or interventions: n/a    To be done/followed up on inpatient unit:  monitor for seizures    Does this patient have any cognitive concerns?: none    Activity level - Baseline/Home:  Independent  Activity Level - Current:   Stand with assist x2    Patient's Preferred language: English   Needed?: No    Isolation: None  Infection: Not Applicable  Bariatric?: No    Vital Signs:   Vitals:    09/30/20 2134 09/30/20 2147 09/30/20 2210 09/30/20 2215   BP: 129/66   119/64   Pulse: 50 (!) 48 55    Resp:   17  "   Temp: 98.2  F (36.8  C)      TempSrc: Oral      SpO2: 98% 99% 100%        Cardiac Rhythm:     Was the PSS-3 completed:   Yes  What interventions are required if any?               Family Comments: n/a  OBS brochure/video discussed/provided to patient/family: N/A              Name of person given brochure if not patient: n/a              Relationship to patient: n/a    For the majority of the shift this patient's behavior was Green.   Behavioral interventions performed were reassurance and information.    ED NURSE PHONE NUMBER: *[phone]

## 2020-10-01 NOTE — CONSULTS
DANIELE Weir has a call out to Omayra Phelpsjaiden [phone].  She has been patient's behavior health  under the CD commitment from established on 3-22-20.    Writer is calling Ms Angela to ask if patient's commitment ended on 9-22 or if she requested an extension.  Writer is very familiar  with patient.  He has a history of admissions in which he has suicidal thoughts while intoxicated.  During his last hospitalization, 6-10 to  7-20-20, he was still under a Commitment and he transferred to [facility] facility.     Writer just got a call back from Ms Angela, She reports patients was at [facility] two months and his commitment just ended last Friday.  She did not extend his commitment as she feels she doesn't have any other services to offer patient.  She said patient stays sober in a structured environment but feels he will continue to use unless he decides to move into an assisted living environment.   From previous work with patient, it is known he hasn't qualified for MA due to assests he has which he is wanted to save for his children's inheritance.   PLAN: Will await psychiatry input.

## 2020-10-01 NOTE — CONSULTS
DANIELE Weir has a call out to Omayra Phelpsjaiden [phone].  She has been patient's behavior health  under the CD commitment from established on 3-22-20.    Writer is calling Ms Angela to ask if patient's commitment ended on 9-22 or if she requested an extension.  Writer is very familiar  with patient.  He has a history of admissions in which he has suicidal thoughts while intoxicated.  During his last hospitalization, 6-10 to  7-20-20, he was still under a Commitment and he transferred to [facility] facility.    Writer just got a call back from Ms Angela, She reports patients was at [facility] two months and his commitment just ended last Friday.  She did not extend his commitment as she feels she doesn't have any other services to offer patient.  She said patient stays sober in a structured environment but feels he will continue to use unless he decides to move into an assisted living environment.   From previous work with patient, it is known he hasn't qualified for MA due to assests he has which he is wanted to save for his children's inheritance.   PLAN: Will await psychiatry input.

## 2020-10-01 NOTE — CONSULTS
[facility]  Psychiatry Consultation      Patient name: Jim Richard   MRN: [id number]    Age: 65 year old    YOB: 1955    Identifying information:   The patient is a 65 year old male who is currently admitted to the hospitalist service.    Reason for consult: Mental health evaluation and risk assessment.    History of present illness:   The patient presented to the emergency room in a state of alcohol intoxication during which time he was reporting a recent suicide attempt/gesture.  It was noted that he had taken a few tablets of atenolol while experiencing suicidal ideation.  After doing so, he proceeded to call 911 for help.  In the emergency room, his blood alcohol level was 0.32.  Psychiatry was consulted to assist in comanaging his alcohol use disorder his suicide risk.  On examination today, the patient is working with me through prior hospitalizations.  He explains that since our last meeting, he has attended 2 different residential treatment programs.  He was most recently receiving treatment through the [facility] where he maintained residence for 81 days before graduating the program.  He returned back to his town home afterwards and quickly relapsed on alcohol.  He reports drinking a significant amount to the point of intoxication.  During a moment where he was feeling regret and remorse regarding his recent relapse on alcohol, he began to experience suicidal ideation leading him to take a few medications as a means of overdosing.  Shortly after doing so, his desire to continue living became more prominent and he contacted 911 in hopes of maintaining medical stability.  Today, he complains that he is not receiving Valium off and on.  His last dose was yesterday evening and he feels slightly shaky and anxious due to withdrawal symptoms.  He denied suicidal ideation and attributed the recent overdose to alcohol intoxication.  He did not seem particularly  interested in pursuing chemical dependency treatments as he states he recently finished treatment and needs to begin utilizing his coping skills and sobriety plan.    Psychiatric Review of Systems:    -- Depressive episode: Admits to worsening depressive symptoms while intoxicated with alcohol however currently denies active depressive symptoms or vegetative symptoms.  He denied suicidal and homicidal thoughts.  -- Uma:  denies symptoms  -- Psychosis:  denies symptoms  -- Anxiety: denies symptoms corresponding to MARIA FERNANDA or panic disorder  -- PTSD: denies symptoms  -- OCD: denies  symptoms  -- Eating disorder: denies symptoms    Psychiatric History:    The patient has an established diagnosis of major depressive disorder and sees Dr. Rand for outpatient care.  He is prescribed Trintellix for antidepressant treatment.  This appears to be of psychosis overdose in the setting of suicidal ideation and alcohol intoxication.  1 prior inpatient psychiatric hospitalization in May 2020.    Substance Use History:    Drug of choice is alcohol with pattern of usage corresponding to dependence, further reporting tolerance, loss of control over usage, progressive usage, drinking to the point of blackout and intoxication, developing various medical consequences related to his usage, some of which have included liver cirrhosis, ascites, esophageal varices, and various functional limitations related to his use.  He has been to detox and treatment in the past, most recently at the [place] residential treatment [facility].  He is currently under a stay of commitment for treatment of chemical dependency.  He denied a history of withdrawal seizures or DTs.  No recent illicit substance usage reported.    Medical History:  Refer to the internal medicine notes which I reviewed.   Past Medical History:   Diagnosis Date     Alcoholism (H) 1/14/2013    had treatment at [facility]      Coronary artery disease 09/09/2014     Nuclear stress test with slight fixed defect inferiorly, no ischemia     Depression     w/anxiety     Esophageal varices with bleeding 04/28/2018    6 varices banded on EGD     Heart attack (H)      Hepatomegaly     Fatty liver, chronic alcoholic     Hyperlipemia      Hypertension      JANIS on CPAP      Peripheral vascular disease (H)      PVD (peripheral vascular disease) (H)      SDH (subdural hematoma) (H) 04/26/2018    Right side, resolved spontaneously     Spinal stenosis            Current Facility-Administered Medications:      acetaminophen (TYLENOL) Suppository 650 mg, 650 mg, Rectal, Q4H PRN, Raul iLm MD     acetaminophen (TYLENOL) tablet 650 mg, 650 mg, Oral, Q4H PRN, Raul Lim MD     bisacodyl (DULCOLAX) Suppository 10 mg, 10 mg, Rectal, Daily PRN, Raul Lim MD     folic acid (FOLVITE) tablet 1 mg, 1 mg, Oral, Daily, Raul Lim MD, 1 mg at 10/01/20 0937     LORazepam (ATIVAN) tablet 1-2 mg, 1-2 mg, Oral, Q30 Min PRN **OR** LORazepam (ATIVAN) injection 1-2 mg, 1-2 mg, Intravenous, Q30 Min PRN, Raul Lim MD     LORazepam (ATIVAN) injection 2 mg, 2 mg, Intravenous, Q3 Min PRN, Raul Lim MD     magnesium sulfate 4 g in 100 mL sterile water (premade), 4 g, Intravenous, Q4H PRN, Raul Lim MD     melatonin tablet 3 mg, 3 mg, Oral, At Bedtime PRN, Raul Lim MD     multivitamin w/minerals (THERA-VIT-M) tablet 1 tablet, 1 tablet, Oral, Daily, Raul Lim MD, 1 tablet at 10/01/20 0937     naloxone (NARCAN) injection 0.1-0.4 mg, 0.1-0.4 mg, Intravenous, Q2 Min PRN, Raul Lim MD     ondansetron (ZOFRAN-ODT) ODT tab 4 mg, 4 mg, Oral, Q6H PRN, 4 mg at 10/01/20 0937 **OR** ondansetron (ZOFRAN) injection 4 mg, 4 mg, Intravenous, Q6H PRN, Raul Lim MD     polyethylene glycol (MIRALAX) Packet 17 g, 17 g, Oral, Daily PRN, Raul Lim MD     potassium chloride (KLOR-CON) Packet 20-40 mEq, 20-40 mEq, Oral or Feeding Tube,  Q2H PRN, Raul Lim MD     potassium chloride 10 mEq in 100 mL intermittent infusion with 10 mg lidocaine, 10 mEq, Intravenous, Q1H PRN, Raul Lim MD     potassium chloride 10 mEq in 100 mL sterile water intermittent infusion (premix), 10 mEq, Intravenous, Q1H PRN, Raul Lim MD     potassium chloride 20 mEq in 50 mL intermittent infusion, 20 mEq, Intravenous, Q1H PRN, Raul Lim MD     potassium chloride ER (KLOR-CON M) CR tablet 20-40 mEq, 20-40 mEq, Oral, Q2H PRN, Raul Lim MD     potassium phosphate 15 mmol in D5W 250 mL intermittent infusion, 15 mmol, Intravenous, Daily PRN, Raul Lim MD     potassium phosphate 20 mmol in D5W 250 mL intermittent infusion, 20 mmol, Intravenous, Q6H PRN, Raul Lim MD     potassium phosphate 20 mmol in D5W 500 mL intermittent infusion, 20 mmol, Intravenous, Q6H PRN, Raul Lim MD     potassium phosphate 25 mmol in D5W 500 mL intermittent infusion, 25 mmol, Intravenous, Q8H PRN, Raul Lim MD     prochlorperazine (COMPAZINE) injection 5 mg, 5 mg, Intravenous, Q6H PRN **OR** prochlorperazine (COMPAZINE) tablet 5 mg, 5 mg, Oral, Q6H PRN **OR** prochlorperazine (COMPAZINE) suppository 12.5 mg, 12.5 mg, Rectal, Q12H PRN, Raul Lim MD     QUEtiapine (SEROquel) tablet 25-50 mg, 25-50 mg, Oral, Q4H PRN, Jeff Brito MD, 25 mg at 10/01/20 1357     senna-docusate (SENOKOT-S/PERICOLACE) 8.6-50 MG per tablet 1 tablet, 1 tablet, Oral, BID PRN **OR** senna-docusate (SENOKOT-S/PERICOLACE) 8.6-50 MG per tablet 2 tablet, 2 tablet, Oral, BID PRN, Raul Lim MD     thiamine (B-1) tablet 100 mg, 100 mg, Oral, Daily, Raul Lim MD, 100 mg at 10/01/20 0937     traZODone (DESYREL) tablet 100 mg, 100 mg, Oral, At Bedtime PRN, Jeff Brito MD     vortioxetine (TRINTELLIX) tablet 10 mg, 10 mg, Oral, Daily, Jeff Brito MD, 10 mg at 10/01/20 1707     Social History:  Refer to the psychosocial  "assessment completed by the .     Family History:    The patient denied a family history of mental illnesses or suicide attempts    Vital Signs:    B/P: 140/66, T: 98.8, P: 50, R: 16  Estimated body mass index is 27.41 kg/m  as calculated from the following:    Height as of 8/20/20: 1.702 m (5' 7\").    Weight as of 8/20/20: 79.4 kg (175 lb).       Mental status examination:  Appearance:  Alert, fair hygiene, no acute distress  Attitude:  Attempts to be cooperative  Eye Contact: Fair  Mood: Fine\"  Affect: Mood congruent and blunted  Speech:  Normal rates, tone, latency, volume. Not pushed or pressured.  Psychomotor Behavior:  No psychomotor abnormalities noted  Thought Process: Linear and logical; not tangential or circumstantial or disorganized  Associations:  Logical; no loose associations Noted  Thought Content:  No obvious paranoia, delusions, ideas of reference, or grandiosity noted. Denies auditory or visual hallucinations. Denies suicidal Ideations. Denies homicidal ideations.  Insight:  Fair  Judgment:  Fair  Oriented to:  time, person, and place  Attention Span and Concentration:  Intact  Recent and Remote Memory: Intact based on interviewing and details provided  Language: Appropriate based on interviewing  Fund of Knowledge: Appropriate based on interviewing  Muscle Strength and Tone: Normal upon visual inspection  Gait and Station: Normal upon visual inspection            Diagnoses:    Alcohol use disorder, severe  Alcohol withdrawal  Substance induced mood disorder (alcohol related)  Major depressive disorder-recurrent, mild  Hepatic cirrhosis with ascites  BPH  Chronic kidney disease     Recommendations:  -He mad an impulsive suicidal gesture while intoxicated and now that he is no longer intoxicated, his acute suicide risk is low. The 1:1 sitter for suicide precautions may be discontinued.      -Continue [facility] protocol for management of alcohol withdrawal symptoms     -Hector has been " restarted for antidepressant treatment. Trazodone restarted for sleep. Seroquel restated for anxiety management.      -Discontinued the health officer hold; treatment may proceed voluntarily. Unit  has confirmed that he is no longer under involuntary commitment.      -He is not interested in CD treatment at this time there for CD consult is not warranted. His plan for sobriety involves AA meetings and sober supports.      -Once he is determined to be medically stable, he may be discharged home.     Please reconsult with psychiatry as needed.   Jeff Brito MD   Text Page

## 2020-10-01 NOTE — PROGRESS NOTES
Pt was transferred from the ED this AM on Suicide precautions.  Room checked and hazards removed.  Belongings accounted for an in lockers.  VSS at this time.  Currently has a sitter in the room

## 2020-10-01 NOTE — PROGRESS NOTES
Discharge    Patient discharged to home via cab  Care plan note    Listed belongings gathered and returned to patient. Yes  Care Plan and Patient education resolved: Yes  Prescriptions if needed, hard copies sent with patient  NA  Home and hospital acquired medications returned to patient: NA  Medication Bin checked and emptied on discharge Yes  Follow up appointment made for patient: No. Patient will follow up by self   Patient stated that the medication that was prescribed on 7/24 is not working. Patient is requesting a call to discuss.     Patient Name: Susan A Weiland  Caller Name: Agnieszka  Callback Number: [phone]  Best Availability: any   Did you confirm the message with the caller?: yes    Thank you,  Werner Oliveira

## 2020-10-01 NOTE — CONSULTS
Psychiatry consult: initial  Full note to follow    Alcohol use disorder, severe  Alcohol withdrawal  Substance induced mood disorder (alcohol related)  Major depressive disorder-recurrent, mild  Hepatic cirrhosis with ascites  BPH  Chronic kidney disease    Recommendations:  -He mad an impulsive suicidal gesture while intoxicated and now that he is no longer intoxicated, his acute suicide risk is low. The 1:1 sitter for suicide precautions may be discontinued.     -Continue [facility] protocol for management of alcohol withdrawal symptoms    -Trintillex has been restarted for antidepressant treatment. Trazodone restarted for sleep. Seroquel restated for anxiety management.     -Discontinued the health officer hold; treatment may proceed voluntarily. Unit  has confirmed that he is no longer under involuntary commitment.     -He is not interested in CD treatment at this time there for CD consult is not warranted. His plan for sobriety involves AA meetings and sober supports.     -Once he is determined to be medically stable, he may be discharged home.     Please reconsult as needed.   Jeff Brito MD   Text Page

## 2020-10-01 NOTE — PROGRESS NOTES
Paracentesis     850 Straw fluid removed from abdominal space  Patient tolerated procedure well.  Dressing CDI, no swelling or hematoma.

## 2020-10-01 NOTE — ED NOTES
DATE:  9/30/2020   TIME OF RECEIPT FROM LAB:  2220  LAB TEST:  Ethanol  LAB VALUE:  0.32  RESULTS GIVEN WITH READ-BACK TO (PROVIDER):  Dagoberto Garcia MD  TIME LAB VALUE REPORTED TO PROVIDER:   10:26 PM

## 2020-10-02 LAB
SARS-COV-2 RNA SPEC QL NAA+PROBE: NOT DETECTED
SPECIMEN SOURCE: NORMAL

## 2020-10-05 ENCOUNTER — HOSPITAL ENCOUNTER (EMERGENCY)
Facility: CLINIC | Age: 65
Discharge: HOME | DRG: 372 | End: 2020-10-05
Attending: EMERGENCY MEDICINE | Admitting: EMERGENCY MEDICINE
Payer: MEDICARE

## 2020-10-05 VITALS
HEART RATE: 79 BPM | WEIGHT: 175 LBS | DIASTOLIC BLOOD PRESSURE: 75 MMHG | SYSTOLIC BLOOD PRESSURE: 145 MMHG | RESPIRATION RATE: 18 BRPM | BODY MASS INDEX: 27.47 KG/M2 | OXYGEN SATURATION: 94 % | HEIGHT: 67 IN | TEMPERATURE: 98.4 F

## 2020-10-05 DIAGNOSIS — F32.A DEPRESSION, UNSPECIFIED DEPRESSION TYPE: ICD-10-CM

## 2020-10-05 DIAGNOSIS — F10.920 ALCOHOL INTOXICATION, UNCOMPLICATED (H): ICD-10-CM

## 2020-10-05 LAB
ALBUMIN SERPL-MCNC: 3.3 G/DL (ref 3.4–5)
ALP SERPL-CCNC: 187 U/L (ref 40–150)
ALT SERPL W P-5'-P-CCNC: 22 U/L (ref 0–70)
AMMONIA PLAS-SCNC: 32 UMOL/L (ref 10–50)
ANION GAP SERPL CALCULATED.3IONS-SCNC: 17 MMOL/L (ref 3–14)
AST SERPL W P-5'-P-CCNC: 39 U/L (ref 0–45)
BASOPHILS # BLD AUTO: 0.1 10E9/L (ref 0–0.2)
BASOPHILS NFR BLD AUTO: 0.7 %
BILIRUB SERPL-MCNC: 0.9 MG/DL (ref 0.2–1.3)
BUN SERPL-MCNC: 36 MG/DL (ref 7–30)
CALCIUM SERPL-MCNC: 8.2 MG/DL (ref 8.5–10.1)
CHLORIDE SERPL-SCNC: 110 MMOL/L (ref 94–109)
CO2 SERPL-SCNC: 17 MMOL/L (ref 20–32)
CREAT SERPL-MCNC: 1.83 MG/DL (ref 0.66–1.25)
DIFFERENTIAL METHOD BLD: ABNORMAL
EOSINOPHIL # BLD AUTO: 0 10E9/L (ref 0–0.7)
EOSINOPHIL NFR BLD AUTO: 0.3 %
ERYTHROCYTE [DISTWIDTH] IN BLOOD BY AUTOMATED COUNT: 16.4 % (ref 10–15)
ETHANOL SERPL-MCNC: 0.33 G/DL
GFR SERPL CREATININE-BSD FRML MDRD: 37 ML/MIN/{1.73_M2}
GLUCOSE SERPL-MCNC: 62 MG/DL (ref 70–99)
HCT VFR BLD AUTO: 31.6 % (ref 40–53)
HGB BLD-MCNC: 10.3 G/DL (ref 13.3–17.7)
IMM GRANULOCYTES # BLD: 0 10E9/L (ref 0–0.4)
IMM GRANULOCYTES NFR BLD: 0.1 %
INR PPP: 1.22 (ref 0.86–1.14)
LIPASE SERPL-CCNC: 348 U/L (ref 73–393)
LYMPHOCYTES # BLD AUTO: 0.6 10E9/L (ref 0.8–5.3)
LYMPHOCYTES NFR BLD AUTO: 8.5 %
MAGNESIUM SERPL-MCNC: 2 MG/DL (ref 1.6–2.3)
MCH RBC QN AUTO: 29.6 PG (ref 26.5–33)
MCHC RBC AUTO-ENTMCNC: 32.6 G/DL (ref 31.5–36.5)
MCV RBC AUTO: 91 FL (ref 78–100)
MONOCYTES # BLD AUTO: 0.3 10E9/L (ref 0–1.3)
MONOCYTES NFR BLD AUTO: 3.6 %
NEUTROPHILS # BLD AUTO: 6.3 10E9/L (ref 1.6–8.3)
NEUTROPHILS NFR BLD AUTO: 86.8 %
NRBC # BLD AUTO: 0 10*3/UL
NRBC BLD AUTO-RTO: 0 /100
PLATELET # BLD AUTO: 202 10E9/L (ref 150–450)
POTASSIUM SERPL-SCNC: 4.4 MMOL/L (ref 3.4–5.3)
PROT SERPL-MCNC: 7.3 G/DL (ref 6.8–8.8)
RBC # BLD AUTO: 3.48 10E12/L (ref 4.4–5.9)
SODIUM SERPL-SCNC: 144 MMOL/L (ref 133–144)
WBC # BLD AUTO: 7.2 10E9/L (ref 4–11)

## 2020-10-05 PROCEDURE — 85025 COMPLETE CBC W/AUTO DIFF WBC: CPT | Performed by: EMERGENCY MEDICINE

## 2020-10-05 PROCEDURE — 83690 ASSAY OF LIPASE: CPT | Performed by: EMERGENCY MEDICINE

## 2020-10-05 PROCEDURE — 83735 ASSAY OF MAGNESIUM: CPT | Performed by: EMERGENCY MEDICINE

## 2020-10-05 PROCEDURE — 82140 ASSAY OF AMMONIA: CPT | Performed by: EMERGENCY MEDICINE

## 2020-10-05 PROCEDURE — 85610 PROTHROMBIN TIME: CPT | Performed by: EMERGENCY MEDICINE

## 2020-10-05 PROCEDURE — 80053 COMPREHEN METABOLIC PANEL: CPT | Performed by: EMERGENCY MEDICINE

## 2020-10-05 PROCEDURE — 99285 EMERGENCY DEPT VISIT HI MDM: CPT

## 2020-10-05 PROCEDURE — 80320 DRUG SCREEN QUANTALCOHOLS: CPT | Performed by: EMERGENCY MEDICINE

## 2020-10-05 ASSESSMENT — ENCOUNTER SYMPTOMS
NAUSEA: 1
VOMITING: 0
DYSPHORIC MOOD: 1
DIARRHEA: 1

## 2020-10-05 ASSESSMENT — MIFFLIN-ST. JEOR: SCORE: 1532.42

## 2020-10-05 NOTE — ED PROVIDER NOTES
History   Chief Complaint  Alcohol Intoxication    The history is provided by the patient and the EMS personnel. History limited by: mendoza historian.      Jim Richard is a 65 year old male with a history of alcoholism, anxiety, depression, hyperlipidemia, and hypertension who presents via EMS for evaluation of dysphoric mood while drinking alcohol. He also had 3-4 episodes of diarrhea today, and per EMS he is incontinent of stool. He has some nausea, but no vomiting.  He denies any other symptoms including abdominal pain at this time.  He also denies being suicidal although he admits to being depressed.    Allergies  Amlodipine  Lisinopril    Medications  Albuterol inhaler  Atenolol  Breo ellipta inhaler  Lasix  Protonix  Seroquel  Spironolactone  Trintellix   Remeron  Protonix     Past Medical History  Alcoholism  Anxiety  CAD  Depression  Esophageal varices  MI  Hepatomegaly  Hyperlipidemia  Hypertension  JANIS on CPAP  Peripheral vascular disease  Subdural hematoma  Spinal stenosis   Alcoholic cirrhosis  COPD  Opioid abuse  Umbilical hernia     Past Surgical History  Appendectomy  Bypass graft femoropopliteal  Endarterectomy femoral  Abdominal hernia repair  Laminectomy fusion lumbar 3+ levels  Tonsillectomy and adenoidectomy     Family History  Son - mental illness  Mother - CAD  Father - substance abuse, lung cancer  Brother - substance abuse     Social History  The patient was unaccompanied to the ED.  Smoking Status: 1 pack a day  Smokeless Tobacco: never  Alcohol Use: not currently  Drug Use: no     Review of Systems   Gastrointestinal: Positive for diarrhea and nausea. Negative for vomiting.   Psychiatric/Behavioral: Positive for dysphoric mood. Negative for suicidal ideas.   All other systems reviewed and are negative.      Physical Exam     Patient Vitals for the past 24 hrs:   BP Temp Temp src Pulse Resp SpO2 Height Weight   10/05/20 1823 (!) 145/75 98.4  F (36.9  C) Oral 79 18 94 % 1.702 m (5'  "7\") 79.4 kg (175 lb)       Physical Exam  Physical Exam   Nursing note and vitals reviewed.   Constitutional:  Awake and alert but somewhat intoxicated and smells of alcohol. Cooperative.   HENT:   Nose:    Nose normal.   Mouth/Throat:  Mucous membranes are normal.   Eyes:    Conjunctivae normal and EOM are normal.      Pupils are equal, round, and reactive to light.   Neck:    Trachea normal.   Cardiovascular:  Normal rate, regular rhythm, normal heart sounds and normal pulses. No murmur heard.   Pulmonary/Chest:  Effort normal and breath sounds normal.   Abdominal:   Soft. Normal appearance and bowel sounds are normal.      Mild diffuse tenderness to palpitation.      There is no rebound and no CVA tenderness.   Musculoskeletal:  Extremities atraumatic x 4.   Lymphadenopathy:  No cervical adenopathy.   Neurological:   Awake, intoxicated. Normal strength.      No cranial nerve deficit or sensory deficit. GCS eye subscore is 4. GCS verbal subscore is 5.    GCS motor subscore is 6.   Skin:    Skin is intact. No rash noted.   Psychiatric:   Depressed, Intoxicated, but denies suicidal ideation.     Emergency Department Course   Laboratory:  Laboratory findings were communicated with the patient who voiced understanding of the findings.    CMP: chloride 110 (H), carbon dioxide 17 (L), anion gap 17 (H), glucose 62 (L), creatinine 1.83 (H), GFR estimate 37 (L), calcium 8.2 (L), albumin 3.3 (L), alkphos 187 (H) o/w WNL  CBC: HGB 10.3 (L) o/w WNL (WBC 7.2, )  INR: 1.22 (H)  Lipase: 348  Alcohol ethyl: 0.33 (H)  Magnesium: 2.0  Ammonia: 32    Emergency Department Course:  Past medical records, nursing notes, and vitals reviewed.    1845 I physically examined the patient as documented above.    IV was inserted and blood was drawn for laboratory testing, results above.    2110 I rechecked the patient and discussed the findings of their workup thus far.     Findings and plan explained to the Patient. Patient discharged " home with instructions regarding supportive care, medications, and reasons to return. The importance of close follow-up was reviewed.     I personally reviewed the laboratory results with the Patient and answered all related questions prior to discharge.     Impression & Plan   Medical Decision Making:  This is a 65-year-old male with a significant medical history who came in essentially because he was intoxicated and depressed.  Initially, he was not the best historian and I felt it was reasonable given all of his medical issues to check the above blood work.  His blood work is consistent with his chronic problems and alcohol intoxication.  He sobered up appropriately and feels comfortable going home.  Therefore he was discharged, but I indicated he should follow-up with his physician and return with any concerns or worsening symptoms.    Diagnosis:    ICD-10-CM    1. Alcohol intoxication, uncomplicated (H)  F10.920    2. Depression, unspecified depression type  F32.9      Disposition:  Discharged to home.    Scribe Disclosure:  I, Lory Stafford, am serving as a scribe at 6:09 PM on 10/5/2020 to document services personally performed by Edwin Colvin MD based on my observations and the provider's statements to me.      Edwin Colvin MD  10/05/20 3743

## 2020-10-05 NOTE — ED TRIAGE NOTES
EMS arrival:    Alcohol intoxication.  Called  earlier, didn't want to go in.    Called again, wanted to go in, but did not want to go in.  Incontinent of stool in his bed.    Intoxicated.  Gets sad.  Calls 911. States he developed diarrhea today and he 'needs to be admitted to the medical floor'

## 2020-10-05 NOTE — ED NOTES
Bed: [facility]  Expected date:   Expected time:   Means of arrival:   Comments:  Leana 443 ETOH 65 m

## 2020-10-05 NOTE — ED AVS SNAPSHOT
[facility] Emergency Dept  [address] [place] [address]  Phone: [phone]  Fax: [phone]                                    Jim Richard   MRN: [id number]    Department: [facility] Emergency Dept   Date of Visit: 10/5/2020           After Visit Summary Signature Page    I have received my discharge instructions, and my questions have been answered. I have discussed any challenges I see with this plan with the nurse or doctor.    ..........................................................................................................................................  Patient/Patient Representative Signature      ..........................................................................................................................................  Patient Representative Print Name and Relationship to Patient    ..................................................               ................................................  Date                                   Time    ..........................................................................................................................................  Reviewed by Signature/Title    ...................................................              ..............................................  Date                                               Time          22EPIC Rev 08/18

## 2020-10-06 ENCOUNTER — HOSPITAL ENCOUNTER (INPATIENT)
Facility: CLINIC | Age: 65
LOS: 5 days | Discharge: HOME | DRG: 372 | End: 2020-10-11
Attending: EMERGENCY MEDICINE | Admitting: HOSPITALIST
Payer: MEDICARE

## 2020-10-06 ENCOUNTER — APPOINTMENT (OUTPATIENT)
Dept: CT IMAGING | Facility: CLINIC | Age: 65
DRG: 372 | End: 2020-10-06
Attending: EMERGENCY MEDICINE
Payer: MEDICARE

## 2020-10-06 DIAGNOSIS — R10.32 ABDOMINAL PAIN, LEFT LOWER QUADRANT: ICD-10-CM

## 2020-10-06 DIAGNOSIS — A04.72 C. DIFFICILE COLITIS: Primary | ICD-10-CM

## 2020-10-06 DIAGNOSIS — R19.7 DIARRHEA, UNSPECIFIED TYPE: ICD-10-CM

## 2020-10-06 DIAGNOSIS — E87.29 ALCOHOLIC KETOACIDOSIS: ICD-10-CM

## 2020-10-06 DIAGNOSIS — K70.31 ALCOHOLIC CIRRHOSIS OF LIVER WITH ASCITES (H): ICD-10-CM

## 2020-10-06 DIAGNOSIS — E87.20 ACIDOSIS: ICD-10-CM

## 2020-10-06 DIAGNOSIS — K52.9 COLITIS: ICD-10-CM

## 2020-10-06 LAB
ALBUMIN SERPL-MCNC: 3.3 G/DL (ref 3.4–5)
ALBUMIN UR-MCNC: 10 MG/DL
ALP SERPL-CCNC: 183 U/L (ref 40–150)
ALT SERPL W P-5'-P-CCNC: 23 U/L (ref 0–70)
ANION GAP SERPL CALCULATED.3IONS-SCNC: 13 MMOL/L (ref 3–14)
APPEARANCE UR: CLEAR
AST SERPL W P-5'-P-CCNC: 38 U/L (ref 0–45)
BASOPHILS # BLD AUTO: 0 10E9/L (ref 0–0.2)
BASOPHILS NFR BLD AUTO: 0.5 %
BILIRUB SERPL-MCNC: 1.1 MG/DL (ref 0.2–1.3)
BILIRUB UR QL STRIP: NEGATIVE
BUN SERPL-MCNC: 35 MG/DL (ref 7–30)
C DIFF TOX B STL QL: POSITIVE
CALCIUM SERPL-MCNC: 7.9 MG/DL (ref 8.5–10.1)
CHLORIDE SERPL-SCNC: 106 MMOL/L (ref 94–109)
CO2 SERPL-SCNC: 17 MMOL/L (ref 20–32)
COLOR UR AUTO: YELLOW
CREAT SERPL-MCNC: 1.87 MG/DL (ref 0.66–1.25)
DIFFERENTIAL METHOD BLD: ABNORMAL
EOSINOPHIL # BLD AUTO: 0 10E9/L (ref 0–0.7)
EOSINOPHIL NFR BLD AUTO: 0.5 %
ERYTHROCYTE [DISTWIDTH] IN BLOOD BY AUTOMATED COUNT: 16.4 % (ref 10–15)
ETHANOL SERPL-MCNC: 0.19 G/DL
GFR SERPL CREATININE-BSD FRML MDRD: 36 ML/MIN/{1.73_M2}
GLUCOSE SERPL-MCNC: 66 MG/DL (ref 70–99)
GLUCOSE UR STRIP-MCNC: NEGATIVE MG/DL
HCT VFR BLD AUTO: 27.9 % (ref 40–53)
HGB BLD-MCNC: 9.2 G/DL (ref 13.3–17.7)
HGB UR QL STRIP: NEGATIVE
IMM GRANULOCYTES # BLD: 0 10E9/L (ref 0–0.4)
IMM GRANULOCYTES NFR BLD: 0.1 %
KETONES UR STRIP-MCNC: 5 MG/DL
LACTATE BLD-SCNC: 1.8 MMOL/L (ref 0.7–2)
LACTATE BLD-SCNC: 4.3 MMOL/L (ref 0.7–2)
LACTATE BLD-SCNC: 5.8 MMOL/L (ref 0.7–2)
LEUKOCYTE ESTERASE UR QL STRIP: NEGATIVE
LYMPHOCYTES # BLD AUTO: 0.9 10E9/L (ref 0.8–5.3)
LYMPHOCYTES NFR BLD AUTO: 11.3 %
MCH RBC QN AUTO: 30 PG (ref 26.5–33)
MCHC RBC AUTO-ENTMCNC: 33 G/DL (ref 31.5–36.5)
MCV RBC AUTO: 91 FL (ref 78–100)
MONOCYTES # BLD AUTO: 0.5 10E9/L (ref 0–1.3)
MONOCYTES NFR BLD AUTO: 6.7 %
MUCOUS THREADS #/AREA URNS LPF: PRESENT /LPF
NEUTROPHILS # BLD AUTO: 6.2 10E9/L (ref 1.6–8.3)
NEUTROPHILS NFR BLD AUTO: 80.9 %
NITRATE UR QL: NEGATIVE
NRBC # BLD AUTO: 0 10*3/UL
NRBC BLD AUTO-RTO: 0 /100
PH UR STRIP: 6 PH (ref 5–7)
PLATELET # BLD AUTO: 174 10E9/L (ref 150–450)
POTASSIUM SERPL-SCNC: 4.1 MMOL/L (ref 3.4–5.3)
PROCALCITONIN SERPL-MCNC: 0.07 NG/ML
PROT SERPL-MCNC: 7.3 G/DL (ref 6.8–8.8)
RBC # BLD AUTO: 3.07 10E12/L (ref 4.4–5.9)
RBC #/AREA URNS AUTO: <1 /HPF (ref 0–2)
SARS-COV-2 RNA SPEC QL NAA+PROBE: NOT DETECTED
SODIUM SERPL-SCNC: 136 MMOL/L (ref 133–144)
SOURCE: ABNORMAL
SP GR UR STRIP: 1.01 (ref 1–1.03)
SPECIMEN SOURCE: ABNORMAL
SPECIMEN SOURCE: NORMAL
SQUAMOUS #/AREA URNS AUTO: 1 /HPF (ref 0–1)
UROBILINOGEN UR STRIP-MCNC: NORMAL MG/DL (ref 0–2)
WBC # BLD AUTO: 7.7 10E9/L (ref 4–11)
WBC #/AREA URNS AUTO: 2 /HPF (ref 0–5)

## 2020-10-06 PROCEDURE — 250N000013 HC RX MED GY IP 250 OP 250 PS 637: Performed by: HOSPITALIST

## 2020-10-06 PROCEDURE — 99221 1ST HOSP IP/OBS SF/LOW 40: CPT | Performed by: PSYCHIATRY & NEUROLOGY

## 2020-10-06 PROCEDURE — 83605 ASSAY OF LACTIC ACID: CPT | Performed by: INTERNAL MEDICINE

## 2020-10-06 PROCEDURE — 96374 THER/PROPH/DIAG INJ IV PUSH: CPT

## 2020-10-06 PROCEDURE — 250N000009 HC RX 250: Performed by: INTERNAL MEDICINE

## 2020-10-06 PROCEDURE — 87040 BLOOD CULTURE FOR BACTERIA: CPT | Performed by: EMERGENCY MEDICINE

## 2020-10-06 PROCEDURE — 74177 CT ABD & PELVIS W/CONTRAST: CPT

## 2020-10-06 PROCEDURE — 258N000003 HC RX IP 258 OP 636: Performed by: INTERNAL MEDICINE

## 2020-10-06 PROCEDURE — 250N000013 HC RX MED GY IP 250 OP 250 PS 637: Performed by: EMERGENCY MEDICINE

## 2020-10-06 PROCEDURE — 99285 EMERGENCY DEPT VISIT HI MDM: CPT | Mod: 25

## 2020-10-06 PROCEDURE — 250N000011 HC RX IP 250 OP 636: Performed by: INTERNAL MEDICINE

## 2020-10-06 PROCEDURE — 83605 ASSAY OF LACTIC ACID: CPT | Performed by: EMERGENCY MEDICINE

## 2020-10-06 PROCEDURE — 84145 PROCALCITONIN (PCT): CPT | Performed by: EMERGENCY MEDICINE

## 2020-10-06 PROCEDURE — C9803 HOPD COVID-19 SPEC COLLECT: HCPCS

## 2020-10-06 PROCEDURE — U0003 INFECTIOUS AGENT DETECTION BY NUCLEIC ACID (DNA OR RNA); SEVERE ACUTE RESPIRATORY SYNDROME CORONAVIRUS 2 (SARS-COV-2) (CORONAVIRUS DISEASE [COVID-19]), AMPLIFIED PROBE TECHNIQUE, MAKING USE OF HIGH THROUGHPUT TECHNOLOGIES AS DESCRIBED BY CMS-2020-01-R: HCPCS | Performed by: EMERGENCY MEDICINE

## 2020-10-06 PROCEDURE — 81001 URINALYSIS AUTO W/SCOPE: CPT | Performed by: EMERGENCY MEDICINE

## 2020-10-06 PROCEDURE — 250N000011 HC RX IP 250 OP 636: Performed by: HOSPITALIST

## 2020-10-06 PROCEDURE — 250N000009 HC RX 250: Performed by: EMERGENCY MEDICINE

## 2020-10-06 PROCEDURE — 87506 IADNA-DNA/RNA PROBE TQ 6-11: CPT | Performed by: EMERGENCY MEDICINE

## 2020-10-06 PROCEDURE — 87493 C DIFF AMPLIFIED PROBE: CPT | Performed by: EMERGENCY MEDICINE

## 2020-10-06 PROCEDURE — 250N000011 HC RX IP 250 OP 636: Performed by: EMERGENCY MEDICINE

## 2020-10-06 PROCEDURE — 80053 COMPREHEN METABOLIC PANEL: CPT | Performed by: EMERGENCY MEDICINE

## 2020-10-06 PROCEDURE — 258N000001 HC RX 258: Performed by: EMERGENCY MEDICINE

## 2020-10-06 PROCEDURE — 36415 COLL VENOUS BLD VENIPUNCTURE: CPT | Performed by: INTERNAL MEDICINE

## 2020-10-06 PROCEDURE — 80320 DRUG SCREEN QUANTALCOHOLS: CPT | Performed by: EMERGENCY MEDICINE

## 2020-10-06 PROCEDURE — 258N000003 HC RX IP 258 OP 636: Performed by: EMERGENCY MEDICINE

## 2020-10-06 PROCEDURE — 120N000001 HC R&B MED SURG/OB

## 2020-10-06 PROCEDURE — 96361 HYDRATE IV INFUSION ADD-ON: CPT

## 2020-10-06 PROCEDURE — 99223 1ST HOSP IP/OBS HIGH 75: CPT | Mod: AI | Performed by: HOSPITALIST

## 2020-10-06 PROCEDURE — 85025 COMPLETE CBC W/AUTO DIFF WBC: CPT | Performed by: EMERGENCY MEDICINE

## 2020-10-06 RX ORDER — HYDROXYZINE HYDROCHLORIDE 50 MG/1
50-100 TABLET, FILM COATED ORAL 3 TIMES DAILY PRN
Status: ON HOLD | COMMUNITY
End: 2020-11-18

## 2020-10-06 RX ORDER — AMOXICILLIN 250 MG
1 CAPSULE ORAL 2 TIMES DAILY PRN
Status: DISCONTINUED | OUTPATIENT
Start: 2020-10-06 | End: 2020-10-11 | Stop reason: HOSPADM

## 2020-10-06 RX ORDER — POTASSIUM CHLORIDE 29.8 MG/ML
20 INJECTION INTRAVENOUS
Status: DISCONTINUED | OUTPATIENT
Start: 2020-10-06 | End: 2020-10-07 | Stop reason: CLARIF

## 2020-10-06 RX ORDER — LANOLIN ALCOHOL/MO/W.PET/CERES
100 CREAM (GRAM) TOPICAL DAILY
Status: COMPLETED | OUTPATIENT
Start: 2020-10-07 | End: 2020-10-11

## 2020-10-06 RX ORDER — NICOTINE 21 MG/24HR
1 PATCH, TRANSDERMAL 24 HOURS TRANSDERMAL DAILY
Status: DISCONTINUED | OUTPATIENT
Start: 2020-10-06 | End: 2020-10-11 | Stop reason: HOSPADM

## 2020-10-06 RX ORDER — POTASSIUM CHLORIDE 750 MG/1
20 TABLET, EXTENDED RELEASE ORAL 2 TIMES DAILY
Status: ON HOLD | COMMUNITY
End: 2020-11-18

## 2020-10-06 RX ORDER — ACETAMINOPHEN 325 MG/1
650 TABLET ORAL EVERY 4 HOURS PRN
Status: DISCONTINUED | OUTPATIENT
Start: 2020-10-06 | End: 2020-10-11 | Stop reason: HOSPADM

## 2020-10-06 RX ORDER — POTASSIUM CHLORIDE 1500 MG/1
20-40 TABLET, EXTENDED RELEASE ORAL
Status: DISCONTINUED | OUTPATIENT
Start: 2020-10-06 | End: 2020-10-11 | Stop reason: HOSPADM

## 2020-10-06 RX ORDER — MORPHINE SULFATE 2 MG/ML
2-4 INJECTION, SOLUTION INTRAMUSCULAR; INTRAVENOUS
Status: DISCONTINUED | OUTPATIENT
Start: 2020-10-06 | End: 2020-10-06

## 2020-10-06 RX ORDER — TAMSULOSIN HYDROCHLORIDE 0.4 MG/1
0.4 CAPSULE ORAL DAILY
Status: DISCONTINUED | OUTPATIENT
Start: 2020-10-06 | End: 2020-10-11 | Stop reason: HOSPADM

## 2020-10-06 RX ORDER — DIAZEPAM 5 MG
10 TABLET ORAL EVERY 30 MIN PRN
Status: DISCONTINUED | OUTPATIENT
Start: 2020-10-06 | End: 2020-10-10

## 2020-10-06 RX ORDER — ATENOLOL 25 MG/1
25 TABLET ORAL DAILY
Status: DISCONTINUED | OUTPATIENT
Start: 2020-10-06 | End: 2020-10-11 | Stop reason: HOSPADM

## 2020-10-06 RX ORDER — MORPHINE SULFATE 4 MG/ML
4 INJECTION, SOLUTION INTRAMUSCULAR; INTRAVENOUS ONCE
Status: COMPLETED | OUTPATIENT
Start: 2020-10-06 | End: 2020-10-06

## 2020-10-06 RX ORDER — AMOXICILLIN 250 MG
2 CAPSULE ORAL 2 TIMES DAILY PRN
Status: DISCONTINUED | OUTPATIENT
Start: 2020-10-06 | End: 2020-10-11 | Stop reason: HOSPADM

## 2020-10-06 RX ORDER — SPIRONOLACTONE 25 MG/1
25 TABLET ORAL DAILY
Status: DISCONTINUED | OUTPATIENT
Start: 2020-10-06 | End: 2020-10-11 | Stop reason: HOSPADM

## 2020-10-06 RX ORDER — MAGNESIUM OXIDE 400 MG/1
800 TABLET ORAL ONCE
Status: COMPLETED | OUTPATIENT
Start: 2020-10-06 | End: 2020-10-06

## 2020-10-06 RX ORDER — MULTIVITAMIN,THERAPEUTIC
1 TABLET ORAL ONCE
Status: COMPLETED | OUTPATIENT
Start: 2020-10-06 | End: 2020-10-06

## 2020-10-06 RX ORDER — FUROSEMIDE 20 MG/1
10 TABLET ORAL DAILY
Status: DISCONTINUED | OUTPATIENT
Start: 2020-10-06 | End: 2020-10-07

## 2020-10-06 RX ORDER — ONDANSETRON 2 MG/ML
4 INJECTION INTRAMUSCULAR; INTRAVENOUS EVERY 6 HOURS PRN
Status: DISCONTINUED | OUTPATIENT
Start: 2020-10-06 | End: 2020-10-11 | Stop reason: HOSPADM

## 2020-10-06 RX ORDER — NALOXONE HYDROCHLORIDE 0.4 MG/ML
.1-.4 INJECTION, SOLUTION INTRAMUSCULAR; INTRAVENOUS; SUBCUTANEOUS
Status: DISCONTINUED | OUTPATIENT
Start: 2020-10-06 | End: 2020-10-11 | Stop reason: HOSPADM

## 2020-10-06 RX ORDER — LANOLIN ALCOHOL/MO/W.PET/CERES
100 CREAM (GRAM) TOPICAL DAILY
Status: DISCONTINUED | OUTPATIENT
Start: 2020-10-06 | End: 2020-10-06

## 2020-10-06 RX ORDER — HYDROMORPHONE HYDROCHLORIDE 1 MG/ML
0.3 INJECTION, SOLUTION INTRAMUSCULAR; INTRAVENOUS; SUBCUTANEOUS
Status: DISCONTINUED | OUTPATIENT
Start: 2020-10-06 | End: 2020-10-11 | Stop reason: HOSPADM

## 2020-10-06 RX ORDER — TRAZODONE HYDROCHLORIDE 100 MG/1
100 TABLET ORAL
Status: ON HOLD | COMMUNITY
End: 2020-11-18

## 2020-10-06 RX ORDER — ONDANSETRON 4 MG/1
4 TABLET, ORALLY DISINTEGRATING ORAL EVERY 6 HOURS PRN
Status: DISCONTINUED | OUTPATIENT
Start: 2020-10-06 | End: 2020-10-11 | Stop reason: HOSPADM

## 2020-10-06 RX ORDER — MIRTAZAPINE 30 MG/1
30 TABLET, FILM COATED ORAL AT BEDTIME
Status: ON HOLD | COMMUNITY
End: 2020-11-18

## 2020-10-06 RX ORDER — QUETIAPINE FUMARATE 25 MG/1
50-100 TABLET, FILM COATED ORAL EVERY 6 HOURS PRN
Status: DISCONTINUED | OUTPATIENT
Start: 2020-10-06 | End: 2020-10-07

## 2020-10-06 RX ORDER — TRAZODONE HYDROCHLORIDE 100 MG/1
100 TABLET ORAL AT BEDTIME
Status: DISCONTINUED | OUTPATIENT
Start: 2020-10-06 | End: 2020-10-11 | Stop reason: HOSPADM

## 2020-10-06 RX ORDER — MAGNESIUM SULFATE HEPTAHYDRATE 40 MG/ML
4 INJECTION, SOLUTION INTRAVENOUS EVERY 4 HOURS PRN
Status: DISCONTINUED | OUTPATIENT
Start: 2020-10-06 | End: 2020-10-11 | Stop reason: HOSPADM

## 2020-10-06 RX ORDER — FOLIC ACID 1 MG/1
1 TABLET ORAL ONCE
Status: COMPLETED | OUTPATIENT
Start: 2020-10-06 | End: 2020-10-06

## 2020-10-06 RX ORDER — PANTOPRAZOLE SODIUM 40 MG/1
40 TABLET, DELAYED RELEASE ORAL DAILY
Status: DISCONTINUED | OUTPATIENT
Start: 2020-10-06 | End: 2020-10-07

## 2020-10-06 RX ORDER — VANCOMYCIN HYDROCHLORIDE 125 MG/1
125 CAPSULE ORAL 4 TIMES DAILY
Status: DISCONTINUED | OUTPATIENT
Start: 2020-10-06 | End: 2020-10-11 | Stop reason: HOSPADM

## 2020-10-06 RX ORDER — POTASSIUM CHLORIDE 7.45 MG/ML
10 INJECTION INTRAVENOUS
Status: DISCONTINUED | OUTPATIENT
Start: 2020-10-06 | End: 2020-10-11 | Stop reason: HOSPADM

## 2020-10-06 RX ORDER — LANOLIN ALCOHOL/MO/W.PET/CERES
100 CREAM (GRAM) TOPICAL ONCE
Status: COMPLETED | OUTPATIENT
Start: 2020-10-06 | End: 2020-10-06

## 2020-10-06 RX ORDER — LIDOCAINE 40 MG/G
CREAM TOPICAL
Status: DISCONTINUED | OUTPATIENT
Start: 2020-10-06 | End: 2020-10-11 | Stop reason: HOSPADM

## 2020-10-06 RX ORDER — QUETIAPINE FUMARATE 50 MG/1
50 TABLET, FILM COATED ORAL ONCE
Status: COMPLETED | OUTPATIENT
Start: 2020-10-06 | End: 2020-10-06

## 2020-10-06 RX ORDER — PROCHLORPERAZINE MALEATE 5 MG
5 TABLET ORAL EVERY 6 HOURS PRN
Status: DISCONTINUED | OUTPATIENT
Start: 2020-10-06 | End: 2020-10-11 | Stop reason: HOSPADM

## 2020-10-06 RX ORDER — FOLIC ACID 1 MG/1
1 TABLET ORAL DAILY
Status: DISCONTINUED | OUTPATIENT
Start: 2020-10-07 | End: 2020-10-11 | Stop reason: HOSPADM

## 2020-10-06 RX ORDER — POTASSIUM CHLORIDE 1.5 G/1.58G
20-40 POWDER, FOR SOLUTION ORAL
Status: DISCONTINUED | OUTPATIENT
Start: 2020-10-06 | End: 2020-10-11 | Stop reason: HOSPADM

## 2020-10-06 RX ORDER — IOPAMIDOL 755 MG/ML
85 INJECTION, SOLUTION INTRAVASCULAR ONCE
Status: COMPLETED | OUTPATIENT
Start: 2020-10-06 | End: 2020-10-06

## 2020-10-06 RX ORDER — ACETAMINOPHEN 650 MG/1
650 SUPPOSITORY RECTAL EVERY 4 HOURS PRN
Status: DISCONTINUED | OUTPATIENT
Start: 2020-10-06 | End: 2020-10-11 | Stop reason: HOSPADM

## 2020-10-06 RX ORDER — MULTIPLE VITAMINS W/ MINERALS TAB 9MG-400MCG
1 TAB ORAL DAILY
Status: DISCONTINUED | OUTPATIENT
Start: 2020-10-07 | End: 2020-10-11 | Stop reason: HOSPADM

## 2020-10-06 RX ORDER — CIPROFLOXACIN 2 MG/ML
400 INJECTION, SOLUTION INTRAVENOUS EVERY 12 HOURS
Status: DISCONTINUED | OUTPATIENT
Start: 2020-10-06 | End: 2020-10-07

## 2020-10-06 RX ORDER — PROCHLORPERAZINE 25 MG
12.5 SUPPOSITORY, RECTAL RECTAL EVERY 12 HOURS PRN
Status: DISCONTINUED | OUTPATIENT
Start: 2020-10-06 | End: 2020-10-11 | Stop reason: HOSPADM

## 2020-10-06 RX ORDER — METHOCARBAMOL 500 MG/1
500 TABLET, FILM COATED ORAL 3 TIMES DAILY PRN
Status: ON HOLD | COMMUNITY
End: 2020-11-18

## 2020-10-06 RX ORDER — POTASSIUM CL/LIDO/0.9 % NACL 10MEQ/0.1L
10 INTRAVENOUS SOLUTION, PIGGYBACK (ML) INTRAVENOUS
Status: DISCONTINUED | OUTPATIENT
Start: 2020-10-06 | End: 2020-10-11 | Stop reason: HOSPADM

## 2020-10-06 RX ORDER — DIAZEPAM 10 MG/2ML
5-10 INJECTION, SOLUTION INTRAMUSCULAR; INTRAVENOUS EVERY 30 MIN PRN
Status: DISCONTINUED | OUTPATIENT
Start: 2020-10-06 | End: 2020-10-10

## 2020-10-06 RX ADMIN — QUETIAPINE 50 MG: 25 TABLET ORAL at 22:07

## 2020-10-06 RX ADMIN — NICOTINE 1 PATCH: 14 PATCH, EXTENDED RELEASE TRANSDERMAL at 20:01

## 2020-10-06 RX ADMIN — HYDROMORPHONE HYDROCHLORIDE 0.3 MG: 1 INJECTION, SOLUTION INTRAMUSCULAR; INTRAVENOUS; SUBCUTANEOUS at 22:04

## 2020-10-06 RX ADMIN — ATENOLOL 25 MG: 25 TABLET ORAL at 08:28

## 2020-10-06 RX ADMIN — DEXTROSE AND SODIUM CHLORIDE: 5; 450 INJECTION, SOLUTION INTRAVENOUS at 04:15

## 2020-10-06 RX ADMIN — MORPHINE SULFATE 4 MG: 2 INJECTION, SOLUTION INTRAMUSCULAR; INTRAVENOUS at 08:25

## 2020-10-06 RX ADMIN — HYDROMORPHONE HYDROCHLORIDE 0.3 MG: 1 INJECTION, SOLUTION INTRAMUSCULAR; INTRAVENOUS; SUBCUTANEOUS at 17:04

## 2020-10-06 RX ADMIN — ONDANSETRON 4 MG: 2 INJECTION INTRAMUSCULAR; INTRAVENOUS at 08:39

## 2020-10-06 RX ADMIN — CIPROFLOXACIN 400 MG: 2 INJECTION, SOLUTION INTRAVENOUS at 22:06

## 2020-10-06 RX ADMIN — QUETIAPINE 50 MG: 25 TABLET ORAL at 08:28

## 2020-10-06 RX ADMIN — THERA TABS 1 TABLET: TAB at 04:13

## 2020-10-06 RX ADMIN — MORPHINE SULFATE 4 MG: 4 INJECTION, SOLUTION INTRAMUSCULAR; INTRAVENOUS at 05:16

## 2020-10-06 RX ADMIN — THIAMINE HCL TAB 100 MG 100 MG: 100 TAB at 04:13

## 2020-10-06 RX ADMIN — SODIUM CHLORIDE 1000 ML: 9 INJECTION, SOLUTION INTRAVENOUS at 03:34

## 2020-10-06 RX ADMIN — METRONIDAZOLE 500 MG: 500 INJECTION, SOLUTION INTRAVENOUS at 11:28

## 2020-10-06 RX ADMIN — SODIUM CHLORIDE 64 ML: 9 INJECTION, SOLUTION INTRAVENOUS at 03:21

## 2020-10-06 RX ADMIN — DIAZEPAM 10 MG: 5 TABLET ORAL at 11:28

## 2020-10-06 RX ADMIN — SODIUM BICARBONATE: 84 INJECTION, SOLUTION INTRAVENOUS at 11:28

## 2020-10-06 RX ADMIN — CIPROFLOXACIN 400 MG: 2 INJECTION, SOLUTION INTRAVENOUS at 12:40

## 2020-10-06 RX ADMIN — PANTOPRAZOLE SODIUM 40 MG: 40 TABLET, DELAYED RELEASE ORAL at 08:28

## 2020-10-06 RX ADMIN — FOLIC ACID 1 MG: 1 TABLET ORAL at 04:13

## 2020-10-06 RX ADMIN — HYDROMORPHONE HYDROCHLORIDE 0.3 MG: 1 INJECTION, SOLUTION INTRAMUSCULAR; INTRAVENOUS; SUBCUTANEOUS at 14:45

## 2020-10-06 RX ADMIN — QUETIAPINE FUMARATE 50 MG: 50 TABLET ORAL at 02:34

## 2020-10-06 RX ADMIN — TRAZODONE HYDROCHLORIDE 100 MG: 100 TABLET ORAL at 22:07

## 2020-10-06 RX ADMIN — QUETIAPINE 50 MG: 25 TABLET ORAL at 14:34

## 2020-10-06 RX ADMIN — METRONIDAZOLE 500 MG: 500 INJECTION, SOLUTION INTRAVENOUS at 20:04

## 2020-10-06 RX ADMIN — DIAZEPAM 10 MG: 5 TABLET ORAL at 20:03

## 2020-10-06 RX ADMIN — TAMSULOSIN HYDROCHLORIDE 0.4 MG: 0.4 CAPSULE ORAL at 08:28

## 2020-10-06 RX ADMIN — IOPAMIDOL 85 ML: 755 INJECTION, SOLUTION INTRAVENOUS at 03:21

## 2020-10-06 RX ADMIN — SODIUM CHLORIDE 500 ML: 9 INJECTION, SOLUTION INTRAVENOUS at 02:31

## 2020-10-06 RX ADMIN — MAGNESIUM OXIDE 800 MG: 400 TABLET ORAL at 04:13

## 2020-10-06 RX ADMIN — VORTIOXETINE 10 MG: 10 TABLET, FILM COATED ORAL at 11:28

## 2020-10-06 ASSESSMENT — MIFFLIN-ST. JEOR: SCORE: 1509.74

## 2020-10-06 ASSESSMENT — ENCOUNTER SYMPTOMS
BLOOD IN STOOL: 0
SHORTNESS OF BREATH: 0
VOMITING: 0
NAUSEA: 0
DIARRHEA: 1
ANAL BLEEDING: 0
COUGH: 1
FEVER: 0
ABDOMINAL PAIN: 1

## 2020-10-06 ASSESSMENT — ACTIVITIES OF DAILY LIVING (ADL)
ADLS_ACUITY_SCORE: 14
ADLS_ACUITY_SCORE: 13
ADLS_ACUITY_SCORE: 11
ADLS_ACUITY_SCORE: 13

## 2020-10-06 NOTE — ED NOTES
DATE:  10/6/2020   TIME OF RECEIPT FROM LAB:  0246  LAB TEST:  lactic  LAB VALUE:  5.8  RESULTS GIVEN WITH READ-BACK TO (PROVIDER):  Dr. Srivastava  TIME LAB VALUE REPORTED TO PROVIDER:   0246

## 2020-10-06 NOTE — ED NOTES
"Pt repeatedly up to ask for water which he has been given multiple times. Pt's gait is steady however still appears intoxicated due to his slurred speech and sleepy eyes. Pt has made some of the following statements \"Take me upstairs, I don't belong here\", \"there is nothing in my room\", \"I need to go to 8th floor. . . . Because I know nurses up there\". \"Am I going to get any of the Seroquel like I asked?\". Pt informed that MD aware.   "

## 2020-10-06 NOTE — PROGRESS NOTES
Arrived to floor via cart, settled in bed.  Calm and cooperative at this time.  VSS, c/o pain 7/10, will treat once meds verified by pharm.  Also requesting Seroquel.

## 2020-10-06 NOTE — ED NOTES
Pt given discharge instructions and cab called to take pt home. Pt's gate steady. Pt able to ambulate independently.  Pt extremely rude to RN stating. Pt reporting how he disliked his hospital stay, reporting RN did not medicate him, and that staff did not do anything for him. RN redirected pt.

## 2020-10-06 NOTE — ED PROVIDER NOTES
History   Chief Complaint:  Diarrhea    HPI  Jim Richard is a 65 year old male with a history of alcoholic cirrhosis with ascites, COPD, hypertension, bleeding esophageal varices, coronary artery disease, and depression (with a recent hospitalization for intentional overdose from 9/30-10/1) who presents via EMS for evaluation of diarrhea. This is his second presentation to the ED in the last 12 hours as he reports being here for increased depression/suicidal ideations in the setting of alcohol intoxication last evening, and discharged home approximately 4 hours prior to this presentation. He now presents for evaluation of persistent diarrhea. This started yesterday evening after eating dinner which he reports was before his first presentation, but was not his main concern at that time so it was not addressed. He did have labs drawn (noted below) and was observed until clinically sober. Currently, he reports having had about 7-8 episodes of non-bloody diarrhea with associated left sided abdominal pain. He denies any recent antibiotic use, abnormal food ingestions, or sick contacts with similar symptoms. He does note a mild cough, but denies any fevers, loss of taste/smell, or known COVID exposure. He reports a history of liver disease requiring recurrent paracentesis, most recently 1.5 weeks ago. Additionally, he states he has not taken any of his medications in the last day but he denies drinking any alcohol since he arrived home from the ED tonight.     From ED Encounter - 10/5/2020:   CBC: WBC 7.2, HGB 10.3 (L),   CMP: chloride 110 (H), carbon dioxide 17 (L), anion gap 17 (H), glucose 62 (L), creatinine 1.83 (H), GFR: 37 (L), calcium 8.2 (L), albumin 3.3 (L), alkphos 187 (H) o/w WNL  INR: 1.22 (H)  Lipase: 348  Alcohol ethyl: 0.33 (H)  Magnesium: 2.0  Ammonia: 32    Allergies:  Amlodipine  Lisinopril    Medications:    Albuterol inhaler   Trazodone   Trintellix  Hydroxyzine   Remeron   Seroquel  "  Lasix  Folic acid   Multivitamins   Flomax  Spironolactone   Potassium chloride   Protonix  Robaxin   Breo Ellipta inhaler  Magnesium oxide     Past Medical History:    Depression   Anxiety   COPD   Alcoholic liver disease  Ascites due to alcoholic cirrhosis   Esophageal varices with bleeding   Pain medication agreement  Tremor   Chronic pain syndrome   Alcohol induced mood disorder  Alcohol dependence, binge pattern   Opioid abuse   Hypertension   Tobacco use   Hyperlipidemia   Subdural hematoma  Peripheral vascular disease  JANIS on CPAP   MI   CAD  Peripheral artery disease  Lumbar spinal stenosis with neurogenic claudication   GI bleed    Past Surgical History:    Appendectomy   Femoral endarterectomy, Femoropopliteal graft bypass, left   Multiple colonoscopies & EGDs   Abdominal hernia repair   Lumbar fusion, laminectomy, 3 levels   T&A    Family History:    CAD - early onset   Substance abuse   Lung cancer   CHF  Lymphoma      Social History:  Marital Status: .  Presents with EMS  Positive for tobacco use. Comment: 1 PPD.   Positive for alcohol use.  Comment: Vodka primarily.   Negative for drug use.     Review of Systems   Constitutional: Negative for fever.   HENT: Negative.    Respiratory: Positive for cough. Negative for shortness of breath.    Gastrointestinal: Positive for abdominal pain and diarrhea. Negative for anal bleeding, blood in stool, nausea and vomiting.   All other systems reviewed and are negative.      Physical Exam     Patient Vitals for the past 24 hrs:   BP Temp Temp src Pulse Resp SpO2 Height Weight   10/06/20 0300 134/55 -- -- 69 -- 95 % -- --   10/06/20 0232 -- -- -- -- -- 98 % -- --   10/06/20 0224 (!) 144/68 98.6  F (37  C) Oral 64 20 -- 1.702 m (5' 7\") 77.1 kg (170 lb)        Physical Exam  General: Patient is alert and uncomfortable appearing.  HEENT: Head atraumatic    Eyes: pupils equal and reactive. Conjunctiva clear   Nares: patent   Oropharynx: no lesions, uvula " midline, no palatal draping, normal voice, no trismus  Neck: Supple without lymphadenopathy, no meningismus  Chest: Heart regular rate and rhythm.   Lungs: Equal clear to auscultation with no wheeze or rales  Abdomen: Soft, lower quadrant tenderness to palpation with voluntary guarding but no rebound, nondistended, normal bowel sounds  Back: No costovertebral angle tenderness, no midline C, T or L spine tenderness  Neuro: Grossly nonfocal, normal speech, strength equal bilaterally, CN 2-12 intact  Extremities: No deformities, equal radial and DP pulses. No clubbing, cyanosis.  No edema  Skin: Warm and dry with no rash.     Emergency Department Course     Imaging:  Radiologic findings were communicated with the patient who voiced understanding of the findings.  CT Abdomen/Pelvis w/o IV contrast:   1.  Cirrhotic liver with multiple upper abdominal and distal esophageal varices. Mild splenomegaly. Small amount of ascites.   2.  Circumferential wall thickening of the rectum suggesting distal colitis/proctitis. There is also wall thickening of the cecum and ascending colon which may be secondary to liver disease. No evidence of diverticulitis. No bowel obstruction, as per radiology.     Laboratory:  Laboratory findings were communicated with the patient who voiced understanding of the findings.  CBC: WBC: 7.7, HGB: 9.2 (L), PLT: 174  CMP: Glucose 66 (L), CO2: 17 (L), BUN: 35 (H), Creatinine: 1.87 (H), GFR; 36 (L), Ca: 7.9 (L), Albumin: 3.3 (L), Alkaline phosphatase: 183 (H), o/w WNL     Lactic acid (Resulted: 0245): 5.8 (HH)  Lactic acid (Resulted: 0353): 4.3 (HH)    Alcohol level: 0.19 (H)  Blood cultures x2: Pending     Enteric Bacteria and Virus Panel Stool: Pending  Clostridium difficile toxin B PCR: Pending    UA with Microscopic: Pending     Asymptomatic COVID-19 Virus PCR: Pending      Interventions:  0231 0.9% NaCl, 500 mL, IV bolus  0234 Seroquel, 50 mg, PO  0334 NS 1L IV   0413 Multivitamin, 1 tablet,  PO   Folic acid, 1 mg, PO   Thiamine, 100 mg, PO   Magnesium oxide, 800 mg, PO  0415 Dextrose 5% and 0.45% NaCl, IV infusion    Emergency Department Course:  Nursing notes and vitals reviewed.   0212: I performed an exam of the patient as documented above.      IV was inserted and blood was drawn for laboratory testing, results above. Medicine administered as documented above.   The patient was sent for a CT abdomen/pelvis while in the emergency department, results above.  m    0412: I consulted with Dr. Ralph of the hospitalist services. She is in agreement to accept the patient for admission.    0415: I rechecked the patient and discussed the results of his workup, notably the plan for admission given his lab and imaging abnormalities.     Findings and plan explained to the Patient who consents to admission. Discussed the patient with Dr. Ralph, who will admit the patient to a medical bed for further monitoring, evaluation, and treatment.    I personally reviewed the laboratory and imaging results with the Patient and answered all related questions prior to admission.    Impression & Plan      Covid-19  Jim Richard was evaluated during a global COVID-19 pandemic, which necessitated consideration that the patient might be at risk for infection with the SARS-CoV-2 virus that causes COVID-19.   Applicable protocols for evaluation were followed during the patient's care.   COVID-19 was considered as part of the patient's evaluation. The plan for testing is:  a test was obtained during this visit.    CMS Diagnosis: The Lactic acid level is elevated due to starvation ketoacidosis, at this time there is no sign of severe sepsis or septic shock.     Medical Decision Making:   Jim Richard is a 65 year old male who presents with 7-8 episodes of nonbloody diarrhea this evening following episodes earlier today as well as left lower quadrant abdominal pain.  Patient was here in the emergency department yesterday  with alcohol intoxication and laboratory studies revealing mild acidosis.  Likely this is related to alcoholic ketoacidosis.  Patient returns with worsening abdominal pain and diarrhea.  Work-up in the emergency department was undertaken as noted above.  Patient is found to have elevated lactic acid which is likely related to his alcoholic ketoacidosis and hypoglycemia.  CT scan reveals colitis and proctitis.  Stool studies have been ordered here.  Continues to be acidotic and hyperglycemic.  Rally pack was provided and then D5 half-normal saline ordered.  Following an initial bolus of normal saline his lactic acid improved to 4.3.  He is afebrile and has a normal white blood cell count.  Although his colitis could be contributing to his lactic acid I think it is likely more related to his chronic alcohol abuse than it is from infectious etiology.  Blood cultures are pending at this time.  Stool studies are pending.  We will plan to admit for continued treatment.  No evidence of withdrawal syndrome at this time although he is at high risk for withdrawal.  Patient is agreeable with the plan for admission and all questions and concerns addressed.    Diagnosis:     ICD-10-CM    1. Diarrhea, unspecified type  R19.7 Blood culture     Lactic acid   2. Abdominal pain, left lower quadrant  R10.32    3. Acidosis  E87.2    4. Alcoholic ketoacidosis  E87.2    5. Colitis  K52.9        Disposition:   Admitted to medicine under the care of Dr. Ralph.      Scribe Disclosure:  I, Shannan Holliday, am serving as a scribe on 10/6/2020 at 2:12 AM to personally document services performed by Chastity Srivastava MD based on my observations and the provider's statements to me.         EMERGENCY DEPARTMENT     Chastity Srivastava MD  10/06/20 0459

## 2020-10-06 NOTE — PROGRESS NOTES
Patient was admitted early this morning.   Patient seen and examine this morning, giving diarrhea, lactic and metabolic acidosis, will start him on IV Bicarb infusion.  Recheck lactic acid later today. Start IV Cipro and flagyl for colitis. Mild ascites at this time and no need for paracentesis at this time.   Will follow.

## 2020-10-06 NOTE — ED NOTES
"[facility]  ED Nurse Handoff Report    ED Chief complaint: Diarrhea      ED Diagnosis:   Final diagnoses:   Diarrhea, unspecified type   Abdominal pain, left lower quadrant   Acidosis   Alcoholic ketoacidosis   Colitis       Code Status: Full Code    Allergies:   Allergies   Allergen Reactions     Amlodipine Swelling     Lisinopril      Other reaction(s): Angioedema  Mouth and tongue swelling   Mouth and tongue swelling          Patient Story: Pt was seen earlier tonight for alcohol intoxication. After he got home, he had onset of 5-6 bouts of diarrhea and abdominal pain. Pt has distended abdomen and had a paracentesis done yesterday.  Focused Assessment:  Pt with distended abdomen. Active bowel sounds. No diarrhea in the ER. Pt is alert and orientated x4. CT shows distal colitis. Blood sugar 66. Initial lactic 5.8. Repeat 4.3    Treatments and/or interventions provided: 1.5L NS; D5 1/2NS at 100cc/hr; Mg, thiamine, folic acid  Patient's response to treatments and/or interventions: pt resting comfortably    To be done/followed up on inpatient unit:  none pending    Does this patient have any cognitive concerns?: none    Activity level - Baseline/Home:  Independent  Activity Level - Current:   Stand with Assist    Patient's Preferred language: English   Needed?: No    Isolation: None and Enteric  Infection: Not Applicable  C-Diff Pending  Bariatric?: No    Vital Signs:   Vitals:    10/06/20 0224 10/06/20 0232 10/06/20 0300   BP: (!) 144/68  134/55   Pulse: 64  69   Resp: 20     Temp: 98.6  F (37  C)     TempSrc: Oral     SpO2:  98% 95%   Weight: 77.1 kg (170 lb)     Height: 1.702 m (5' 7\")         Cardiac Rhythm:     Was the PSS-3 completed:   Yes  What interventions are required if any?  Pt was seen earlier today for alcohol intoxication and being suicidal. Pt denies being suicidal now.             Family Comments: None present  OBS brochure/video discussed/provided to patient/family: " N/A              Name of person given brochure if not patient: N/A              Relationship to patient: N/A    For the majority of the shift this patient's behavior was Yellow.   Behavioral interventions performed were information.    ED NURSE PHONE NUMBER: [phone]

## 2020-10-06 NOTE — ED TRIAGE NOTES
Pt presents to ED via [facility] ems c/o multiple (x5) episodes of diarrhea at home. Pt was just discharged from here a couple of hours ago.

## 2020-10-06 NOTE — ED NOTES
"Pt walked up to nurses window and hit the window hard and said \"can I get some blankets? This is a horse shit place you guys are running\". Pt given blankets. In bed laying down with a very disgruntled attitude.   "

## 2020-10-06 NOTE — PROGRESS NOTES
RECEIVING UNIT ED HANDOFF REVIEW    ED Nurse Handoff Report was reviewed by: Maura Valdivia RN on October 6, 2020 at 6:00 AM

## 2020-10-06 NOTE — PHARMACY-ADMISSION MEDICATION HISTORY
Pharmacy Medication History  Admission medication history interview status for the 10/6/2020  admission is complete. See EPIC admission navigator for prior to admission medications       Medication history sources: Patient and Surescripts  Location of interview:  Phone interview  Medication history source reliability: Moderate  Adherence assessment: unknown    Significant changes made to the medication list:  Changed: Seroquel to 100 mg bid prn, Protonix to 40 mg bid, Trazodone to 100 mg at bedtime prn  Added: Methocarbamol, Remeron, KCL, Hydroxyzine  Removed: Spironolactone        Additional medication history information:   Patient thinks they stopped his Spironolactone which was 25 mg daily    Medication reconciliation completed by provider prior to medication history? Yes    Time spent in this activity: 30 min      Prior to Admission medications    Medication Sig Last Dose Taking? Auth Provider   atenolol (TENORMIN) 25 MG tablet Take 25 mg by mouth daily Past Week at Unknown time Yes Unknown, Entered By History   fluticasone-vilanterol (BREO ELLIPTA) 200-25 MCG/INH inhaler Inhale 1 puff into the lungs daily as needed Patient stated that he takes as needed when smoking  at prn Yes Unknown, Entered By History   folic acid (FOLVITE) 1 MG tablet Take 1 mg by mouth daily Past Week at Unknown time Yes Unknown, Entered By History   furosemide (LASIX) 20 MG tablet Take 10 mg by mouth daily Past Week at Unknown time Yes Unknown, Entered By History   hydrOXYzine (ATARAX) 50 MG tablet Take  mg by mouth 3 times daily as needed for itching  at prn Yes Unknown, Entered By History   magnesium oxide (MAG-OX) 400 MG tablet Take 400 mg by mouth daily Past Week at Unknown time Yes Unknown, Entered By History   methocarbamol (ROBAXIN) 500 MG tablet Take 500 mg by mouth 3 times daily as needed for muscle spasms  at prn Yes Unknown, Entered By History   mirtazapine (REMERON) 30 MG tablet Take 30 mg by mouth At Bedtime Past Week  at Unknown time Yes Unknown, Entered By History   multivitamin w/minerals (THERA-VIT-M) tablet Take 1 tablet by mouth daily Past Week at Unknown time Yes Jude Ledesma MD   pantoprazole (PROTONIX) 40 MG EC tablet Take 40 mg by mouth 2 times daily  Past Week at Unknown time Yes Unknown, Entered By History   potassium chloride ER (KLOR-CON M) 10 MEQ CR tablet Take 20 mEq by mouth 2 times daily Past Week at Unknown time Yes Unknown, Entered By History   QUEtiapine (SEROQUEL) 100 MG tablet Take 100 mg by mouth 2 times daily as needed (moderate agitation)  Past Week at Unknown time Yes Unknown, Entered By History   tamsulosin (FLOMAX) 0.4 MG capsule Take 0.4 mg by mouth daily Past Week at Unknown time Yes Unknown, Entered By History   traZODone (DESYREL) 100 MG tablet Take 100 mg by mouth nightly as needed for sleep  at prn Yes Unknown, Entered By History   vortioxetine (TRINTELLIX) 10 MG tablet Take 1 tablet (10 mg) by mouth daily Past Week at Unknown time Yes Jude Ledesma MD

## 2020-10-06 NOTE — CONSULTS
Care Management Assessment and Discharge Consult    General Information:  Patient's communication style: spoken language (English or Bilingual)  Hearing Difficulty or Deaf: no  Wear Glasses or Blind: no  Cognitive/Neuro/Behavioral: WDL                 Advance Care Planning: Advance Care Planning Reviewed: verified with patient       Living Environment:   People in home: friend(s)  Current living Arrangements: house          Family/Social Support:  Care provided by: self  Provides care for:    Description of Support System: Supportive(Pt reports having friends who support him)  Description of Support System: Supportive(Pt reports having friends who support him)       Lifestyle & Psychosocial Needs:        Socioeconomic History     Marital status:      Spouse name: Not on file     Number of children: 2     Years of education: Not on file     Highest education level: Not on file   Occupational History     Occupation: Printer, on disability     Tobacco Use     Smoking status: Current Every Day Smoker     Packs/day: 1.00     Types: Cigarettes     Smokeless tobacco: Never Used     Tobacco comment: Chantix caused nightmares   Substance and Sexual Activity     Alcohol use: Yes     Comment: hx etoh, drinks vodka      Drug use: No     Sexual activity: Not Currently       Functional Status:  Prior to admission patient needed assistance: NA    Current Resources:   Skilled Home Care Services:  NA  Community Resources: [place]  Equipment currently used at home: none  Supplies currently used at home:      Employment:  Employment Status: disabled,    Financial/Environmental Concerns: insurance, none,      Values/Beliefs:  Spiritual, Cultural Beliefs, Druze Practices, Values that affect care: yes             Discharge Planning:  Expected Discharge Date: 10/08/20  Concerns to be Addressed: readmit risk       Anticipated Discharge Disposition:  Home when medically stable   Anticipated Discharge Services:   NA  Anticipated Discharge DME:  NA    Patient/family educated on Medicare website which has current facility and service quality ratings:  NA  Referrals Placed by CM/SW:  NA  Education Provided on the Discharge Plan:  Yes  Patient/Family in Agreement with the Plan: Yes   Disposition Comments:  Home     Additional Information:  Met with patient and discussed current admissions. Pt is confused about what is going on with his body. He reports his stomach is still sore. Pt reports he just finished treatment at the end of Sept along with his stay of commitment. Pt is retired. Pt reports his friends are his best support systems. Pt does live with a roommate. Plan for pt to go home at discharge. Pt was agreeable tot his plan.     MIRTA Arcos

## 2020-10-06 NOTE — ED NOTES
DATE:  10/5/2020   TIME OF RECEIPT FROM LAB: 8:08 PM  LAB TEST:  Ethanol: 0.33  LAB VALUE:  See above  RESULTS GIVEN WITH READ-BACK TO (PROVIDER):  Edwin Colvin MD  TIME LAB VALUE REPORTED TO PROVIDER:   González

## 2020-10-06 NOTE — PROVIDER NOTIFICATION
MD Notification    Notified Person: MD    Notified Person Name: Roxy    Notification Date/Time: 10/6 5:59 pm    Notification Interaction: Page    Purpose of Notification: Pt asking about Protonix order. I see it is ordered as daily, he takes it twice a day at time. Pt wanting to take to it now too. Can it be ordered?     Orders Received: Never heard back.   Comments:

## 2020-10-06 NOTE — H&P
[facility]    History and Physical  Hospitalist       Date of Admission:  10/6/2020    Assessment & Plan   Jim Richard is a 65 year old male who presents with diarrhea after initial presentation to the ED with complaint of alcohol intoxication in setting of increased depression. Found to have colitis on CT imaging.    Colitis  ~24 hour history of diarrhea with associated abdominal pain. No blood in stools. CT A/P with circumferential wall thickening of rectum suggesting distal colitis/proctitis. procalcitonin low at 0.07. WBC 7.7. Afebrile  - Admit inpatient  - Monitor for stools, c-diff and enteric panel ordered and pending.  - enteric precautions  - Monitor volume status with further diarrhea and consider further IVF  - PRN IV morphine available for pain    Acute alcohol intoxication with history of alcohol dependence Patient has been through treatment multiple times in the past.  He reports 1 week of drinking after recent discharge from 90-day treatment. He was discharged on 10/1 for intentional overdose and alcohol intoxication. Returned to drinking and presented 10/5, initially with alcohol level 0.33---0.19 when he returned second time.  -Daily thiamine, folate, multivitamin  -Psychiatry consulted  -Continue to encourage alcohol cessation  -CIWA protocol with PRN valium    Elevated lactic acid  Likely partial contributions from alcohol related acidosis as well as some hypovolemia from diarrhea  - Mildly improved from 5.8-4.3 after IVF    Anxiety and depression  PTA meds include trintellix  - Psychiatry consulted due to worsening depression. Not currently suicidal  - continue PTA trintellix     Hx Intentional overdose  He took several pills of beta-blocker, tamsulosin which led to his 10/1 admission. He was not seen to be suicidal by psychiatry and hold was no longer needed and he was discharged  - Not suicidal during this admission no signs of intentional overdose this  "admission     Alcoholic cirrhosis of the liver with ascites  History of GI bleed.  Grade 1 esophageal varices with portal gastropathy  - Resume diuretics once diarrhea resolves  - Continue PTA atenolol, protonix  - Consider paracentesis later in this admission as he received ~2L IVF in ED while off his PTA diuretics and small ascites seen on CT may increase  - Last paracentesis 9/23, seems to get them every 10-14 days so would be due sometime this admission     Chronic kidney disease stage III, mild NGUYEN  Baseline creatinine 1.3-1.5.  Currently 1.87   Holding PTA diuretics given large amount of diarrheal stools. Received IVF in ED  - BMP in AM     Anemia  Hemoglobin of 9.2 in the setting of alcohol abuse, iron deficiency.  -Monitor for any evidence of bleeding (stools reportedly non-bloody)     BPH  -Resume PTA flomax     Asymptomatic COVID-19 screening  Low suspicion.  - Add precautions if positive    DVT Prophylaxis: Pneumatic Compression Devices  Code Status: DNR / DNI  Expected discharge: 2 - 3 days, recommended to prior living arrangement once diarrhea improved, not withdrawing    Gissel Ralph DO    Primary Care Physician   FERNANDA BRANTLEY    Chief Complaint   Diarrhea, abdominal pain    History is obtained from the patient    History of Present Illness   Jim Richard is a 65 year old male who presents with ~24 hour history of non bloody diarrheal stools. States he has never had \"the shits like this\" before. He was seen in the ED earlier due to alcohol intoxication in setting of worsening depression. Mentioned briefly the diarrhea, but it was not addressed. Then returned 4 hours after discharge with diarrhea complaint. He reports abdominal pain, initially worse in LLQ, but now more diffuse. Denies fevers, cough, chills, chest pain, shortness of breath, worsening edema. States he has not taken his medications in the past day.  His last paracentesis was 9/23, he has been getting them every ~2 weeks. " He does not normally have pain like this in his abdomen prior to paracentesis.    Past Medical History    I have reviewed this patient's medical history and updated it with pertinent information if needed.   Past Medical History:   Diagnosis Date     Alcoholism (H) 1/14/2013    had treatment at [facility]     Anxiety      Coronary artery disease 09/09/2014    Nuclear stress test with slight fixed defect inferiorly, no ischemia     Depression     w/anxiety     Esophageal varices with bleeding 04/28/2018    6 varices banded on EGD     Heart attack (H)      Hepatomegaly     Fatty liver, chronic alcoholic     Hyperlipemia      Hypertension      JANIS on CPAP      Peripheral vascular disease (H)      PVD (peripheral vascular disease) (H)      SDH (subdural hematoma) (H) 04/26/2018    Right side, resolved spontaneously     Spinal stenosis        Past Surgical History   I have reviewed this patient's surgical history and updated it with pertinent information if needed.  Past Surgical History:   Procedure Laterality Date     APPENDECTOMY       BYPASS GRAFT FEMOROPOPLITEAL  6/12/2012    Procedure: BYPASS GRAFT FEMOROPOPLITEAL;  LEFT FEMORAL TO ABOVE KNEE POPLITEAL BYPASS, ENDARTERECTOMY OF SFA AND PF ARTERIES, LEFT EXTERNAL ILIAC TO COMMON FEMORAL INTERPOSITION GRAFT;  Surgeon: Damir Roberts MD;  Location:  OR     COLONOSCOPY       COLONOSCOPY N/A 8/8/2019    Procedure: COLONOSCOPY;  Surgeon: Pavan Arguello MD;  Location:  GI     COLONOSCOPY N/A 3/10/2020    Procedure: COLONOSCOPY;  Surgeon: Rosendo Shaw MD;  Location:  GI     ENDARTERECTOMY FEMORAL  6/12/2012    Procedure: ENDARTERECTOMY FEMORAL;;  Surgeon: Damir Roberts MD;  Location:  OR     ESOPHAGOSCOPY, GASTROSCOPY, DUODENOSCOPY (EGD), COMBINED N/A 3/22/2018    Procedure: COMBINED ESOPHAGOSCOPY, GASTROSCOPY, DUODENOSCOPY (EGD);  ESOPHAGOSCOPY, GASTROSCOPY, DUODENOSOCPY.;  Surgeon: Valeri Pruitt MD;  Location:  OR     ESOPHAGOSCOPY,  GASTROSCOPY, DUODENOSCOPY (EGD), COMBINED N/A 9/29/2018    Procedure: COMBINED ESOPHAGOSCOPY, GASTROSCOPY, DUODENOSCOPY (EGD);;  Surgeon: Rosendo Shaw MD;  Location:  GI     ESOPHAGOSCOPY, GASTROSCOPY, DUODENOSCOPY (EGD), COMBINED N/A 8/2/2019    Procedure: ESOPHAGOGASTRODUODENOSCOPY (EGD);  Surgeon: Pavan Arguello MD;  Location:  GI     ESOPHAGOSCOPY, GASTROSCOPY, DUODENOSCOPY (EGD), COMBINED N/A 9/25/2019    Procedure: ESOPHAGOGASTRODUODENOSCOPY (EGD);  Surgeon: Pavan Arguello MD;  Location:  GI     ESOPHAGOSCOPY, GASTROSCOPY, DUODENOSCOPY (EGD), COMBINED N/A 3/8/2020    Procedure: ESOPHAGOGASTRODUODENOSCOPY (EGD);  Surgeon: Rosendo Shaw MD;  Location:  GI     ESOPHAGOSCOPY, GASTROSCOPY, DUODENOSCOPY (EGD), COMBINED N/A 6/11/2020    Procedure: ESOPHAGOGASTRODUODENOSCOPY (EGD);  Surgeon: Rosendo Shaw MD;  Location:  GI     HERNIA REPAIR  2017    Abdominal     LAMINECTOMY, FUSION LUMBAR THREE+ LEVEL, COMBINED N/A 9/22/2014    Procedure: COMBINED LAMINECTOMY, FUSION LUMBAR THREE+ LEVEL;  Surgeon: Sebastien Pruitt MD;  Location:  OR     TONSILLECTOMY & ADENOIDECTOMY         Prior to Admission Medications   Prior to Admission Medications   Prescriptions Last Dose Informant Patient Reported? Taking?   QUEtiapine (SEROQUEL) 100 MG tablet  Self Yes No   Sig: Take  mg by mouth every 6 hours as needed (moderate agitation)   atenolol (TENORMIN) 25 MG tablet  Self Yes No   Sig: Take 25 mg by mouth daily   fluticasone-vilanterol (BREO ELLIPTA) 200-25 MCG/INH inhaler  Self Yes No   Sig: Inhale 1 puff into the lungs daily as needed Patient stated that he takes as needed when smoking   folic acid (FOLVITE) 1 MG tablet  Self Yes No   Sig: Take 1 mg by mouth daily   furosemide (LASIX) 20 MG tablet  Self Yes No   Sig: Take 10 mg by mouth daily   magnesium oxide (MAG-OX) 400 MG tablet  Self Yes No   Sig: Take 400 mg by mouth daily   multivitamin w/minerals (THERA-VIT-M) tablet  Self  No No   Sig: Take 1 tablet by mouth daily   pantoprazole (PROTONIX) 40 MG EC tablet  Self Yes No   Sig: Take 40 mg by mouth daily   spironolactone (ALDACTONE) 25 MG tablet  Self Yes No   Sig: Take 25 mg by mouth daily   tamsulosin (FLOMAX) 0.4 MG capsule  Self Yes No   Sig: Take 0.4 mg by mouth daily   traZODone (DESYREL) 100 MG tablet  Self No No   Sig: Take 1 tablet (100 mg) by mouth At Bedtime   vortioxetine (TRINTELLIX) 10 MG tablet  Self No No   Sig: Take 1 tablet (10 mg) by mouth daily      Facility-Administered Medications: None     Allergies   Allergies   Allergen Reactions     Amlodipine Swelling     Lisinopril      Other reaction(s): Angioedema  Mouth and tongue swelling   Mouth and tongue swelling          Social History   I have reviewed this patient's social history and updated it with pertinent information if needed. Jim CALDERON Jose Manuel  reports that he has been smoking cigarettes. He has been smoking about 1.00 pack per day. He has never used smokeless tobacco. He reports current alcohol use. He reports that he does not use drugs.    Family History   I have reviewed this patient's family history and updated it with pertinent information if needed.   Family History   Problem Relation Age of Onset     Mental Illness Son      Coronary Artery Disease Early Onset Mother      Substance Abuse Father      Lung Cancer Father      Substance Abuse Brother      Unknown/Adopted No family hx of      Depression No family hx of      Anxiety Disorder No family hx of      Schizophrenia No family hx of      Bipolar Disorder No family hx of      Suicide No family hx of      Dementia No family hx of      Anthony Disease No family hx of      Parkinsonism No family hx of      Autism Spectrum Disorder No family hx of      Intellectual Disability (Mental Retardation) No family hx of        Review of Systems   The 10 point Review of Systems is negative other than noted in the HPI    Physical Exam   Temp: 98.8  F (37.1  C)  Temp src: Oral BP: 137/52 Pulse: 61   Resp: 20 SpO2: 97 % O2 Device: None (Room air)    Vital Signs with Ranges  Temp:  [98.3  F (36.8  C)-98.8  F (37.1  C)] 98.8  F (37.1  C)  Pulse:  [57-79] 61  Resp:  [16-20] 20  BP: (124-145)/(52-75) 137/52  SpO2:  [93 %-98 %] 97 %  170 lbs 0 oz    Constitutional: Awake, alert, cooperative, mild distress  Eyes: Conjunctiva and pupils examined and normal.  HEENT: Moist mucous membranes, normal dentition.  Respiratory: Clear to auscultation bilaterally, no crackles or wheezing.  Cardiovascular: Regular rate and rhythm, normal S1 and S2, and no murmur noted.  GI: minimally distended, soft, diffuse abdominal tenderness with light palpation.  Lymph/Hematologic: No anterior cervical or supraclavicular adenopathy.  Skin: No rashes, no cyanosis, no edema.  Musculoskeletal: No joint swelling, erythema or tenderness.  Neurologic: Cranial nerves 2-12 intact, normal strength and sensation.  Psychiatric: Alert, oriented to person, place and time, no obvious anxiety or depression.    Data   Data reviewed today:  I personally reviewed CT A/P with circumferential wall thickening of rectum suggesting distal colitis/proctitis. Cirrhotic liver with multiple upper abdominal and distal esophageal varices, mild splenomegaly, small ascites.  Recent Labs   Lab 10/06/20  0228 10/05/20  1901 09/30/20  2138 09/30/20  2138   WBC 7.7 7.2  --  6.9   HGB 9.2* 10.3*  --  10.4*   MCV 91 91  --  90    202  --  182   INR  --  1.22*  --  1.18*    144  --  139   POTASSIUM 4.1 4.4  --  3.8   CHLORIDE 106 110*  --  110*   CO2 17* 17*  --  20   BUN 35* 36*  --  29   CR 1.87* 1.83*  --  1.59*   ANIONGAP 13 17*  --  9   KEV 7.9* 8.2*  --  8.1*   GLC 66* 62*  --  75   ALBUMIN 3.3* 3.3*   < > 3.2*   PROTTOTAL 7.3 7.3  --  7.4   BILITOTAL 1.1 0.9  --  0.7   ALKPHOS 183* 187*  --  173*   ALT 23 22  --  27   AST 38 39  --  40   LIPASE  --  348  --   --    TROPI  --   --   --  <0.015    < > = values in this  interval not displayed.       Recent Results (from the past 24 hour(s))   CT Abdomen Pelvis w Contrast    Narrative    EXAM: CT ABDOMEN PELVIS W CONTRAST  LOCATION: [facility]  DATE/TIME: 10/6/2020 3:36 AM    INDICATION: Abdominal pain, diverticulitis suspected.    COMPARISON: 8/3/2019.    TECHNIQUE: CT scan of the abdomen and pelvis was performed following injection of IV contrast. Multiplanar reformats were obtained. Dose reduction techniques were used.    CONTRAST: 85 mL Isovue-370.    FINDINGS:   LOWER CHEST: Normal.    HEPATOBILIARY: Cirrhotic configuration of the liver. Multiple upper abdominal and distal esophageal varices. Recanalized umbilical vein. The main portal vein is patent. The gallbladder is very distended. Probable faintly calcified stones in the   gallbladder.    PANCREAS: Normal.    SPLEEN: Mildly enlarged measuring 14 cm in length.    ADRENAL GLANDS: Normal.    KIDNEYS/BLADDER: 1.8 cm cyst in the upper pole of the left kidney. No hydronephrosis.    BOWEL: Wall thickening of the rectum. No bowel obstruction. Small amount of ascites.    LYMPH NODES: Normal.    VASCULATURE: Atherosclerotic calcification of the aorta and its branches. No aneurysm.    PELVIC ORGANS: Normal.    MUSCULOSKELETAL: Fusion hardware in the lumbosacral spine. Degenerative disease throughout the spine.      Impression    IMPRESSION:   1.  Cirrhotic liver with multiple upper abdominal and distal esophageal varices. Mild splenomegaly. Small amount of ascites.    2.  Circumferential wall thickening of the rectum suggesting distal colitis/proctitis. There is also wall thickening of the cecum and ascending colon which may be secondary to liver disease. No evidence of diverticulitis. No bowel obstruction.

## 2020-10-06 NOTE — ED NOTES
Bed: ED19  Expected date:   Expected time:   Means of arrival:   Comments:  423  65M Diarrhea  2171

## 2020-10-06 NOTE — PROVIDER NOTIFICATION
MD Notification    Notified Person: MD    Notified Person Name: Benson    Notification Date/Time: 10/6 6:53 pm    Notification Interaction: Page    Purpose of Notification: Pt asking for Nicotine patch. Can he have one ordered?    Orders Received: Pending     Comments:

## 2020-10-06 NOTE — PLAN OF CARE
A&Ox4.  AVSS on RA.  C/o LLQ pain, abdomen rounded & tender to touch, initially on IV Morphine for pain, changed to IV Dilaudid, given.  Seroquel PRN given per pt request.  CIWA 7 & 8, 10mg PO Valium given per order.  C/o mild nausea, Zofran IV given, tolerating food intake.  Enteric precaution maintained, stool watery and green, sample collected for C.diff.  Up to bathroom & chair with 1 assist & GB.  Generalized bruising on BUE.  Patient for transfer to , report given to  RN at 1640, patient will have dinner and will be transferred.  Page sent to Dr. Snyder @ 1127, clarifying Lactic recheck as mentioned in notes.  To  per wheelchair @ 1819.

## 2020-10-06 NOTE — ED NOTES
DATE:  10/6/2020   TIME OF RECEIPT FROM LAB:  0353  LAB TEST:  lactic  LAB VALUE:  4.3  RESULTS GIVEN WITH READ-BACK TO (PROVIDER):  Dr. Srivastava  TIME LAB VALUE REPORTED TO PROVIDER:   0355

## 2020-10-06 NOTE — CONSULTS
Psychiatry consult: initial  Patient seen; full note to follow    Alcohol use disorder, severe  Alcohol withdrawal  Substance induced mood disorder (alcohol related)  Major depressive disorder-recurrent, mild  Hepatic cirrhosis with ascites  BPH  Chronic kidney disease  Colitis    Recommendations:  CIWA with valium for management of alcohol withdrawal  Continue to encourage chemical dependency treatment and initiate a CD consult when he is agreeable.      Continue Trintellex for treatment of MDD/MARIA FERNANDA. Poor adherence to the medication in the setting of ongoing alcohol usage has complicated his response.     The patient is at low acute risk for suicide and does not require inpatient psychiatric hospitalization at this time.     Please reconsult as needed.  Jeff Brito MD   Text Page

## 2020-10-07 LAB
ALBUMIN SERPL-MCNC: 3 G/DL (ref 3.4–5)
ANION GAP SERPL CALCULATED.3IONS-SCNC: 5 MMOL/L (ref 3–14)
BASOPHILS # BLD AUTO: 0 10E9/L (ref 0–0.2)
BASOPHILS NFR BLD AUTO: 0.7 %
BUN SERPL-MCNC: 30 MG/DL (ref 7–30)
C COLI+JEJUNI+LARI FUSA STL QL NAA+PROBE: NOT DETECTED
CALCIUM SERPL-MCNC: 8 MG/DL (ref 8.5–10.1)
CHLORIDE SERPL-SCNC: 100 MMOL/L (ref 94–109)
CO2 SERPL-SCNC: 27 MMOL/L (ref 20–32)
CREAT SERPL-MCNC: 1.98 MG/DL (ref 0.66–1.25)
DIFFERENTIAL METHOD BLD: ABNORMAL
EC STX1 GENE STL QL NAA+PROBE: NOT DETECTED
EC STX2 GENE STL QL NAA+PROBE: NOT DETECTED
ENTERIC PATHOGEN COMMENT: NORMAL
EOSINOPHIL # BLD AUTO: 0 10E9/L (ref 0–0.7)
EOSINOPHIL NFR BLD AUTO: 1.4 %
ERYTHROCYTE [DISTWIDTH] IN BLOOD BY AUTOMATED COUNT: 15.9 % (ref 10–15)
GFR SERPL CREATININE-BSD FRML MDRD: 34 ML/MIN/{1.73_M2}
GLUCOSE SERPL-MCNC: 106 MG/DL (ref 70–99)
HCT VFR BLD AUTO: 28.3 % (ref 40–53)
HGB BLD-MCNC: 9.3 G/DL (ref 13.3–17.7)
IMM GRANULOCYTES # BLD: 0 10E9/L (ref 0–0.4)
IMM GRANULOCYTES NFR BLD: 0 %
LYMPHOCYTES # BLD AUTO: 0.5 10E9/L (ref 0.8–5.3)
LYMPHOCYTES NFR BLD AUTO: 18.3 %
MAGNESIUM SERPL-MCNC: 1.7 MG/DL (ref 1.6–2.3)
MCH RBC QN AUTO: 29.8 PG (ref 26.5–33)
MCHC RBC AUTO-ENTMCNC: 32.9 G/DL (ref 31.5–36.5)
MCV RBC AUTO: 91 FL (ref 78–100)
MONOCYTES # BLD AUTO: 0.3 10E9/L (ref 0–1.3)
MONOCYTES NFR BLD AUTO: 10.6 %
NEUTROPHILS # BLD AUTO: 2 10E9/L (ref 1.6–8.3)
NEUTROPHILS NFR BLD AUTO: 69 %
NOROV GI+II ORF1-ORF2 JNC STL QL NAA+PR: NOT DETECTED
NRBC # BLD AUTO: 0 10*3/UL
NRBC BLD AUTO-RTO: 0 /100
OVALOCYTES BLD QL SMEAR: SLIGHT
PHOSPHATE SERPL-MCNC: 2.2 MG/DL (ref 2.5–4.5)
PLATELET # BLD AUTO: 81 10E9/L (ref 150–450)
PLATELET # BLD EST: ABNORMAL 10*3/UL
POTASSIUM SERPL-SCNC: 3.4 MMOL/L (ref 3.4–5.3)
RBC # BLD AUTO: 3.12 10E12/L (ref 4.4–5.9)
RVA NSP5 STL QL NAA+PROBE: NOT DETECTED
SALMONELLA SP RPOD STL QL NAA+PROBE: NOT DETECTED
SHIGELLA SP+EIEC IPAH STL QL NAA+PROBE: NOT DETECTED
SODIUM SERPL-SCNC: 132 MMOL/L (ref 133–144)
V CHOL+PARA RFBL+TRKH+TNAA STL QL NAA+PR: NOT DETECTED
WBC # BLD AUTO: 2.8 10E9/L (ref 4–11)
Y ENTERO RECN STL QL NAA+PROBE: NOT DETECTED

## 2020-10-07 PROCEDURE — 83735 ASSAY OF MAGNESIUM: CPT | Performed by: INTERNAL MEDICINE

## 2020-10-07 PROCEDURE — 258N000003 HC RX IP 258 OP 636: Performed by: INTERNAL MEDICINE

## 2020-10-07 PROCEDURE — 250N000013 HC RX MED GY IP 250 OP 250 PS 637: Performed by: INTERNAL MEDICINE

## 2020-10-07 PROCEDURE — 250N000009 HC RX 250: Performed by: INTERNAL MEDICINE

## 2020-10-07 PROCEDURE — 80069 RENAL FUNCTION PANEL: CPT | Performed by: INTERNAL MEDICINE

## 2020-10-07 PROCEDURE — 250N000011 HC RX IP 250 OP 636: Performed by: INTERNAL MEDICINE

## 2020-10-07 PROCEDURE — 250N000013 HC RX MED GY IP 250 OP 250 PS 637: Performed by: HOSPITALIST

## 2020-10-07 PROCEDURE — 258N000003 HC RX IP 258 OP 636: Performed by: HOSPITALIST

## 2020-10-07 PROCEDURE — 120N000001 HC R&B MED SURG/OB

## 2020-10-07 PROCEDURE — 36415 COLL VENOUS BLD VENIPUNCTURE: CPT | Performed by: INTERNAL MEDICINE

## 2020-10-07 PROCEDURE — 85025 COMPLETE CBC W/AUTO DIFF WBC: CPT | Performed by: INTERNAL MEDICINE

## 2020-10-07 PROCEDURE — 99233 SBSQ HOSP IP/OBS HIGH 50: CPT | Performed by: INTERNAL MEDICINE

## 2020-10-07 PROCEDURE — 250N000009 HC RX 250: Performed by: HOSPITALIST

## 2020-10-07 RX ORDER — METHOCARBAMOL 500 MG/1
500 TABLET, FILM COATED ORAL 3 TIMES DAILY PRN
Status: DISCONTINUED | OUTPATIENT
Start: 2020-10-07 | End: 2020-10-11 | Stop reason: HOSPADM

## 2020-10-07 RX ORDER — PANTOPRAZOLE SODIUM 40 MG/1
40 TABLET, DELAYED RELEASE ORAL
Status: DISCONTINUED | OUTPATIENT
Start: 2020-10-07 | End: 2020-10-11 | Stop reason: HOSPADM

## 2020-10-07 RX ORDER — DIPHENHYDRAMINE HYDROCHLORIDE 50 MG/ML
25 INJECTION INTRAMUSCULAR; INTRAVENOUS EVERY 6 HOURS PRN
Status: DISCONTINUED | OUTPATIENT
Start: 2020-10-07 | End: 2020-10-11 | Stop reason: HOSPADM

## 2020-10-07 RX ORDER — QUETIAPINE FUMARATE 100 MG/1
100 TABLET, FILM COATED ORAL 2 TIMES DAILY PRN
Status: DISCONTINUED | OUTPATIENT
Start: 2020-10-07 | End: 2020-10-11 | Stop reason: HOSPADM

## 2020-10-07 RX ORDER — SODIUM CHLORIDE 9 MG/ML
INJECTION, SOLUTION INTRAVENOUS CONTINUOUS
Status: DISCONTINUED | OUTPATIENT
Start: 2020-10-07 | End: 2020-10-08

## 2020-10-07 RX ORDER — MIRTAZAPINE 15 MG/1
30 TABLET, FILM COATED ORAL AT BEDTIME
Status: DISCONTINUED | OUTPATIENT
Start: 2020-10-07 | End: 2020-10-11 | Stop reason: HOSPADM

## 2020-10-07 RX ORDER — DIPHENHYDRAMINE HCL 25 MG
25 CAPSULE ORAL EVERY 6 HOURS PRN
Status: DISCONTINUED | OUTPATIENT
Start: 2020-10-07 | End: 2020-10-11 | Stop reason: HOSPADM

## 2020-10-07 RX ORDER — HYDROXYZINE HYDROCHLORIDE 25 MG/1
50-100 TABLET, FILM COATED ORAL 3 TIMES DAILY PRN
Status: DISCONTINUED | OUTPATIENT
Start: 2020-10-07 | End: 2020-10-11 | Stop reason: HOSPADM

## 2020-10-07 RX ORDER — OXYCODONE HYDROCHLORIDE 5 MG/1
5 TABLET ORAL EVERY 4 HOURS PRN
Status: DISCONTINUED | OUTPATIENT
Start: 2020-10-07 | End: 2020-10-09

## 2020-10-07 RX ADMIN — TRAZODONE HYDROCHLORIDE 100 MG: 100 TABLET ORAL at 21:33

## 2020-10-07 RX ADMIN — OXYCODONE HYDROCHLORIDE 5 MG: 5 TABLET ORAL at 21:33

## 2020-10-07 RX ADMIN — ATENOLOL 25 MG: 25 TABLET ORAL at 08:50

## 2020-10-07 RX ADMIN — NICOTINE 1 PATCH: 14 PATCH, EXTENDED RELEASE TRANSDERMAL at 08:49

## 2020-10-07 RX ADMIN — POTASSIUM PHOSPHATE, MONOBASIC AND POTASSIUM PHOSPHATE, DIBASIC 15 MMOL: 224; 236 INJECTION, SOLUTION, CONCENTRATE INTRAVENOUS at 11:52

## 2020-10-07 RX ADMIN — TAMSULOSIN HYDROCHLORIDE 0.4 MG: 0.4 CAPSULE ORAL at 08:50

## 2020-10-07 RX ADMIN — HYDROMORPHONE HYDROCHLORIDE 0.3 MG: 1 INJECTION, SOLUTION INTRAMUSCULAR; INTRAVENOUS; SUBCUTANEOUS at 09:01

## 2020-10-07 RX ADMIN — VANCOMYCIN HYDROCHLORIDE 125 MG: 125 CAPSULE ORAL at 12:42

## 2020-10-07 RX ADMIN — PANTOPRAZOLE SODIUM 40 MG: 40 TABLET, DELAYED RELEASE ORAL at 08:49

## 2020-10-07 RX ADMIN — METHOCARBAMOL 500 MG: 500 TABLET ORAL at 18:42

## 2020-10-07 RX ADMIN — VORTIOXETINE 10 MG: 10 TABLET, FILM COATED ORAL at 08:49

## 2020-10-07 RX ADMIN — MIRTAZAPINE 30 MG: 15 TABLET, FILM COATED ORAL at 21:33

## 2020-10-07 RX ADMIN — VANCOMYCIN HYDROCHLORIDE 125 MG: 125 CAPSULE ORAL at 08:50

## 2020-10-07 RX ADMIN — VANCOMYCIN HYDROCHLORIDE 125 MG: 125 CAPSULE ORAL at 18:41

## 2020-10-07 RX ADMIN — DIPHENHYDRAMINE HYDROCHLORIDE 25 MG: 25 CAPSULE ORAL at 04:31

## 2020-10-07 RX ADMIN — MULTIPLE VITAMINS W/ MINERALS TAB 1 TABLET: TAB at 08:50

## 2020-10-07 RX ADMIN — SODIUM CHLORIDE: 9 INJECTION, SOLUTION INTRAVENOUS at 22:36

## 2020-10-07 RX ADMIN — SODIUM BICARBONATE: 84 INJECTION, SOLUTION INTRAVENOUS at 00:56

## 2020-10-07 RX ADMIN — THIAMINE HCL TAB 100 MG 100 MG: 100 TAB at 08:49

## 2020-10-07 RX ADMIN — HYDROXYZINE HYDROCHLORIDE 50 MG: 25 TABLET, FILM COATED ORAL at 12:42

## 2020-10-07 RX ADMIN — PANTOPRAZOLE SODIUM 40 MG: 40 TABLET, DELAYED RELEASE ORAL at 15:48

## 2020-10-07 RX ADMIN — QUETIAPINE 50 MG: 25 TABLET ORAL at 09:01

## 2020-10-07 RX ADMIN — FOLIC ACID 1 MG: 1 TABLET ORAL at 08:50

## 2020-10-07 RX ADMIN — HYDROMORPHONE HYDROCHLORIDE 0.3 MG: 1 INJECTION, SOLUTION INTRAMUSCULAR; INTRAVENOUS; SUBCUTANEOUS at 12:05

## 2020-10-07 RX ADMIN — OXYCODONE HYDROCHLORIDE 5 MG: 5 TABLET ORAL at 15:48

## 2020-10-07 RX ADMIN — SODIUM CHLORIDE: 9 INJECTION, SOLUTION INTRAVENOUS at 08:46

## 2020-10-07 RX ADMIN — VANCOMYCIN HYDROCHLORIDE 125 MG: 125 CAPSULE ORAL at 21:33

## 2020-10-07 RX ADMIN — VANCOMYCIN HYDROCHLORIDE 125 MG: 125 CAPSULE ORAL at 00:56

## 2020-10-07 ASSESSMENT — ACTIVITIES OF DAILY LIVING (ADL)
ADLS_ACUITY_SCORE: 12
ADLS_ACUITY_SCORE: 11
ADLS_ACUITY_SCORE: 12
ADLS_ACUITY_SCORE: 11

## 2020-10-07 NOTE — PROGRESS NOTES
Started po vancomycin as C. Diff positive. Placed IV cipro and flagyl on hold till assessed by daytime hospitalist as this is likely C diff colitis.

## 2020-10-07 NOTE — PROGRESS NOTES
Transfer    S- Transfer to 608 from 33.    B- Alc intox/witdrawal    A- Brief systems assessment: VSS on room air. A&Ox4. SBA. Continent.     R- Transfer to 66 per physician orders. Continue to monitor pt and update physician as needed.      Code status: DNR / DNI  Skin: Bruised  Fall Risk: Yes- Department fall risk interventions implemented.  Isolation: Enteric   Patient belongings: Remains with Pt  Medication drips upon transfer:  ml/hr  Bedside Report Letter Given and explained to pt: Yes  Visitor Designated? NA  If patient is a 72 hour hold/Commitment are belongings removed from room and locked up? NA

## 2020-10-07 NOTE — PLAN OF CARE
DATE & TIME: 10/7/20   Cognitive Concerns/ Orientation : A&Ox4   BEHAVIOR & AGGRESSION TOOL COLOR: Green  CIWA SCORE: 3,3  ABNL VS/O2: VSS on room air  MOBILITY: SBA, GB  PAIN MANAGMENT: Pain in abd and lower back, declined interventions.    DIET: Low fiber  BOWEL/BLADDER: Continent, up to BR  ABNL LAB/BG: NA  DRAIN/DEVICES: PIV infusing IVF  TELEMETRY RHYTHM: NA  SKIN: Bruised  TESTS/PROCEDURES: Positive for Cdiff.   D/C DAY/GOALS/PLACE: Pending   OTHER IMPORTANT INFO: Nicotine patch applied to R shoulder. Itching back, benadryl ordered, no rash.

## 2020-10-07 NOTE — PROGRESS NOTES
[facility]    Hospitalist Progress Note    Brief Summary:  This is a 65 year old male with history of hypertension, hyperlipidemia, depression, peripheral vascular disease, spinal stenosis, coronary artery disease, obstructive sleep apnea on CPAP, esophageal varices status post banding in the past, alcohol abuse admitted with diarrhea and alcohol intoxication.    Assessment & Plan        C. difficile colitis    ~24 hour history of diarrhea with associated abdominal pain. No blood in stools. CT A/P with circumferential wall thickening of rectum suggesting distal colitis/proctitis. procalcitonin low at 0.07. WBC 7.7. Afebrile  Patient was initially started on IV fluids in the ER, I did put him on IV bicarb infusion as he was has metabolic acidosis as well.  Stool sent for C. difficile and enteric bacterial panel.  C. difficile come back positive.  Patient is now started on oral vancomycin 125 mg every 6 hours.  IV Cipro and Flagyl that was started on 10/6/2020 is discontinued now.  Still having some abdominal pain and tenderness but his diarrhea is getting better.  He will need at least 10 to 14 days of oral vancomycin.  We will continue with that.       Acute alcohol intoxication with history of alcohol dependence   Patient has been through treatment multiple times in the past.  He reports 1 week of drinking after recent discharge from 90-day treatment. He was discharged on 10/1 for intentional overdose and alcohol intoxication. Returned to drinking and presented 10/5, initially with alcohol level 0.33---0.19 when he returned second time.  No withdrawal symptoms at this time, he is on multivitamin, folic acid and thiamine.  He is on alcohol withdrawal protocol CIWA and give diazepam according to CIWA.    Metabolic acidosis  Elevated lactic acid  This is most likely secondary to diarrhea, he was started on IV bicarb failure and drip.  His lactic acidosis and metabolic acidosis resolved at  this time.  I will discontinue IV bicarb infusion.  We will start him on IV fluids at this time.     Anxiety and depression  PTA meds include trintellix  - Psychiatry consulted due to worsening depression. Not currently suicidal  - continue PTA trintellix and  Trazodone at this time.   Appreciate input from the Psychiatry.      Hx Intentional overdose  He took several pills of beta-blocker, tamsulosin which led to his 10/1 admission. He was not seen to be suicidal by psychiatry and hold was no longer needed and he was discharged  - Not suicidal during this admission no signs of intentional overdose this admission     Alcoholic cirrhosis of the liver with ascites  History of GI bleed.  Grade 1 esophageal varices with portal gastropathy  - Resume diuretics once diarrhea resolves  - Continue PTA atenolol, protonix  - only small ascites on CT abdomen on admission, but currently on IV fluids because of lactic acidosis and diarrhea.   If Ascites worsen will consider paracentesis but will like to avoid at this time, given C diff   - Last paracentesis 9/23, seems to get them every 10-14 days so would be due sometime this admission     Chronic kidney disease stage III, mild NGUYEN  Baseline creatinine 1.3-1.5.  Currently 1.87   Holding PTA diuretics given large amount of diarrheal stools. Received IVF as well  Continue IV fluids today.      Anemia/Pancytopenia   This is mostly related to liver cirrhosis and alcohol abuse.   Hemoglobin of 9.2 in the setting of alcohol abuse, iron deficiency.  No active bleeding at this time.      BPH  -Resume PTA flomax     Asymptomatic COVID-19 screening  Negative.       DVT Prophylaxis: Pneumatic Compression Devices  Code Status: No CPR- Do NOT Intubate    Disposition: Expected discharge in 1-2 days if remain stable.     Dhaval Snyder MD  Text Page  (7am - 6pm)    Interval History   Overall he is feeling better as his diarrhea improve, nothing overnight or this morning, but still have  abdominal pain.  No chest pain, SOB, fever, chills, headache or dizziness. Poor appetite.     No other significant event overnight.     -Data reviewed today: I reviewed all new labs and imaging results over the last 24 hours. I personally reviewed no images or EKG's today.    Physical Exam   Temp: 98.5  F (36.9  C) Temp src: Oral BP: (!) 141/67 Pulse: 60   Resp: 18 SpO2: 95 % O2 Device: None (Room air)    Vitals:    10/06/20 0224   Weight: 77.1 kg (170 lb)     Vital Signs with Ranges  Temp:  [98.5  F (36.9  C)-99.8  F (37.7  C)] 98.5  F (36.9  C)  Pulse:  [57-64] 60  Resp:  [18] 18  BP: (114-141)/(54-67) 141/67  SpO2:  [91 %-97 %] 95 %  I/O last 3 completed shifts:  In: 853 [P.O.:750; I.V.:103]  Out: 50 [Stool:50]    Constitutional: awake, alert, cooperative, no apparent distress, and appears stated age  Eyes: Lids and lashes normal, pupils equal, round and reactive to light, extra ocular muscles intact, sclera clear, conjunctiva normal  Respiratory: No increased work of breathing, good air exchange, clear to auscultation bilaterally, no crackles or wheezing  Cardiovascular: Normal apical impulse, regular rate and rhythm, normal S1 and S2, no S3 or S4, and no murmur noted  GI: soft, mild distended, SD positive, diffuse tenderness although better as compare to  Yesterday, no organomegaly   Skin: no bruising or bleeding  Musculoskeletal: no lower extremity pitting edema present  Neurologic: no focal deficit.     Medications     sodium chloride 100 mL/hr at 10/07/20 0846       atenolol  25 mg Oral Daily     folic acid  1 mg Oral Daily     multivitamin w/minerals  1 tablet Oral Daily     nicotine  1 patch Transdermal Daily     nicotine   Transdermal Q8H     pantoprazole  40 mg Oral BID AC     sodium chloride (PF)  3 mL Intracatheter Q8H     [Held by provider] spironolactone  25 mg Oral Daily     tamsulosin  0.4 mg Oral Daily     thiamine  100 mg Oral Daily     traZODone  100 mg Oral At Bedtime     vancomycin  125 mg  Oral 4x Daily     vortioxetine  10 mg Oral Daily       Data   Recent Labs   Lab 10/07/20  0724 10/06/20  0228 10/05/20  1901 09/30/20 2138 09/30/20 2138   WBC 2.8* 7.7 7.2  --  6.9   HGB 9.3* 9.2* 10.3*  --  10.4*   MCV 91 91 91  --  90   PLT 81* 174 202  --  182   INR  --   --  1.22*  --  1.18*   * 136 144  --  139   POTASSIUM 3.4 4.1 4.4  --  3.8   CHLORIDE 100 106 110*  --  110*   CO2 27 17* 17*  --  20   BUN 30 35* 36*  --  29   CR 1.98* 1.87* 1.83*  --  1.59*   ANIONGAP 5 13 17*  --  9   KEV 8.0* 7.9* 8.2*  --  8.1*   * 66* 62*  --  75   ALBUMIN 3.0* 3.3* 3.3*   < > 3.2*   PROTTOTAL  --  7.3 7.3  --  7.4   BILITOTAL  --  1.1 0.9  --  0.7   ALKPHOS  --  183* 187*  --  173*   ALT  --  23 22  --  27   AST  --  38 39  --  40   LIPASE  --   --  348  --   --    TROPI  --   --   --   --  <0.015    < > = values in this interval not displayed.       No results found for this or any previous visit (from the past 24 hour(s)).

## 2020-10-07 NOTE — CONSULTS
[facility]  Psychiatry Consultation      Patient name: Jim Richard   MRN: [id number]    Age: 65 year old    YOB: 1955    Identifying information:   The patient is a 65 year old White male who is a frequent utilizer of hospital services.  He is currently admitted to unit 33 under the hospitalist service.    Reason for consult: Evaluate depressed mood.    History of present illness:   The patient is familiar to me from prior admissions.  He has a history of major depressive disorder and prominent alcohol use disorder.  I last met with the patient 5 days ago during his last hospitalization.  He explains that after returning home, he again relapsed on alcohol.  He began to feel guilty regarding his relapse which intensified his depressed mood and led to brief suicidal ideation while intoxicated with alcohol.  He then called 911 seeking hospitalization to maintain his safety.  He had reported some GI distress leading to his admission to the medical service for further evaluation.  He was diagnosed with a colitis for which she is currently being evaluated and treated for.  Noting that the patient was expressing depressed mood on admission, psychiatry was consulted for further recommendations.  The patient admits that he has not been fully compliant with taking his antidepressant medication.  We reviewed his prior hospitalizations and time spent in residential treatments, during which time his mood had improved while maintaining compliance with his medication while abstaining from alcohol.  He is hoping to maintain sobriety and medication adherence.  He denied contributing psychosocial stressors.  He denied suicidal and homicidal thoughts.  He remains ambivalent about pursuing residential chemical dependency treatment.    Psychiatric Review of Systems:    -- Depressive episode: Mood is depressed, characterized as mild to moderate, accompanied with moderate vegetative symptoms,  he denies feeling helpless or hopeless, anhedonia is mild, he denies suicidal and homicidal thoughts.  -- Uma:  denies symptoms  -- Psychosis:  denies symptoms  -- Anxiety: denies symptoms corresponding to MARIA FERNANDA or panic disorder  -- PTSD: denies symptoms  -- OCD: denies  symptoms  -- Eating disorder: denies symptoms    Psychiatric History:    The patient has an established diagnosis of major depressive disorder and sees Dr. Rand for outpatient care.  He is prescribed Trintellix for antidepressant treatment.  This appears to be of psychosis overdose in the setting of suicidal ideation and alcohol intoxication.  1 prior inpatient psychiatric hospitalization in May 2020.    Substance Use History:    Drug of choice is alcohol with pattern of usage corresponding to dependence, further reporting tolerance, loss of control over usage, progressive usage, drinking to the point of blackout and intoxication, developing various medical consequences related to his usage, some of which have included liver cirrhosis, ascites, esophageal varices, and various functional limitations related to his use.  He has been to detox and treatment in the past, most recently at the [facility] treatment [facility].  History of a stay of commitment for substance use disorder treatment however this may be expiring soon or has already .  He denied a history of withdrawal seizures or DTs.  No recent illicit substance usage reported.    Medical History:  Refer to the internal medicine notes which I reviewed.   Past Medical History:   Diagnosis Date     Alcoholism (H) 2013     Anxiety      Coronary artery disease 2014     Depression      Esophageal varices with bleeding 2018     Heart attack (H)      Hepatomegaly      Hyperlipemia      Hypertension      JANIS on CPAP      Peripheral vascular disease (H)      PVD (peripheral vascular disease) (H)      SDH (subdural hematoma) (H) 2018     Spinal stenosis             Current Facility-Administered Medications:      acetaminophen (TYLENOL) Suppository 650 mg, 650 mg, Rectal, Q4H PRN, Gissel Ralph DO     acetaminophen (TYLENOL) tablet 650 mg, 650 mg, Oral, Q4H PRN, Gissel Ralph DO     atenolol (TENORMIN) tablet 25 mg, 25 mg, Oral, Daily, Gissel Ralph DO, 25 mg at 10/06/20 0828     [Held by provider] ciprofloxacin (CIPRO) infusion 400 mg, 400 mg, Intravenous, Q12H, Dhaval Snyder MD, Stopped at 10/06/20 2222     diazepam (VALIUM) tablet 10 mg, 10 mg, Oral, Q30 Min PRN, 10 mg at 10/06/20 2003 **OR** diazepam (VALIUM) injection 5-10 mg, 5-10 mg, Intravenous, Q30 Min PRN, Gissel Ralph DO     [START ON 10/7/2020] folic acid (FOLVITE) tablet 1 mg, 1 mg, Oral, Daily, Gissel Ralph DO     [Held by provider] furosemide (LASIX) half-tab 10 mg, 10 mg, Oral, Daily, Gissel Ralph DO     HYDROmorphone (PF) (DILAUDID) injection 0.3 mg, 0.3 mg, Intravenous, Q2H PRN, Dhaval Snyder MD, 0.3 mg at 10/06/20 2204     lidocaine (LMX4) cream, , Topical, Q1H PRN, Gissel Ralph DO     lidocaine 1 % 0.1-1 mL, 0.1-1 mL, Other, Q1H PRN, Gissel Ralph DO     magnesium sulfate 4 g in 100 mL sterile water (premade), 4 g, Intravenous, Q4H PRN, Gissel Ralph DO     melatonin tablet 1 mg, 1 mg, Oral, At Bedtime PRN, Gissel Ralph DO     [Held by provider] metroNIDAZOLE (FLAGYL) infusion 500 mg, 500 mg, Intravenous, Q8H, Dhaval Snyder MD, 500 mg at 10/06/20 2004     [START ON 10/7/2020] multivitamin w/minerals (THERA-VIT-M) tablet 1 tablet, 1 tablet, Oral, Daily, Gissel Ralph DO     naloxone (NARCAN) injection 0.1-0.4 mg, 0.1-0.4 mg, Intravenous, Q2 Min PRN, Gissel Ralph DO     nicotine (NICODERM CQ) 14 MG/24HR 24 hr patch 1 patch, 1 patch, Transdermal, Daily, Hanny Knowles MD, 1 patch at 10/06/20 2001     nicotine Patch in  Place, , Transdermal, Q8H, Hanny Knowles MD     ondansetron (ZOFRAN-ODT) ODT tab 4 mg, 4 mg, Oral, Q6H PRN **OR** ondansetron (ZOFRAN) injection 4 mg, 4 mg, Intravenous, Q6H PRN, Green, Gissel Alena, DO, 4 mg at 10/06/20 0839     pantoprazole (PROTONIX) EC tablet 40 mg, 40 mg, Oral, Daily, Green, Gissel Alena, DO, 40 mg at 10/06/20 0828     potassium chloride (KLOR-CON) Packet 20-40 mEq, 20-40 mEq, Oral or Feeding Tube, Q2H PRN, Green, Gissel Alena, DO     potassium chloride 10 mEq in 100 mL intermittent infusion with 10 mg lidocaine, 10 mEq, Intravenous, Q1H PRN, Green, Gissel Alena, DO     potassium chloride 10 mEq in 100 mL sterile water intermittent infusion (premix), 10 mEq, Intravenous, Q1H PRN, Green, Gissel Alena, DO     potassium chloride 20 mEq in 50 mL intermittent infusion, 20 mEq, Intravenous, Q1H PRN, Green, Gissel Alena, DO     potassium chloride ER (KLOR-CON M) CR tablet 20-40 mEq, 20-40 mEq, Oral, Q2H PRN, Green, Gissel Alena, DO     potassium phosphate 15 mmol in D5W 250 mL intermittent infusion, 15 mmol, Intravenous, Daily PRN, Green, Gissel Alena, DO     potassium phosphate 20 mmol in D5W 250 mL intermittent infusion, 20 mmol, Intravenous, Q6H PRN, Green, Gissel Alena, DO     potassium phosphate 20 mmol in D5W 500 mL intermittent infusion, 20 mmol, Intravenous, Q6H PRN, Green, Gissel Alena, DO     potassium phosphate 25 mmol in D5W 500 mL intermittent infusion, 25 mmol, Intravenous, Q8H PRN, Green, Gissel Alena, DO     prochlorperazine (COMPAZINE) injection 5 mg, 5 mg, Intravenous, Q6H PRN **OR** prochlorperazine (COMPAZINE) tablet 5 mg, 5 mg, Oral, Q6H PRN **OR** prochlorperazine (COMPAZINE) suppository 12.5 mg, 12.5 mg, Rectal, Q12H PRN, Gissel Ralph DO     QUEtiapine (SEROquel) tablet  mg,  mg, Oral, Q6H PRN, Gissel Ralph DO, 50 mg at 10/06/20 2207     senna-docusate  "(SENOKOT-S/PERICOLACE) 8.6-50 MG per tablet 1 tablet, 1 tablet, Oral, BID PRN **OR** senna-docusate (SENOKOT-S/PERICOLACE) 8.6-50 MG per tablet 2 tablet, 2 tablet, Oral, BID PRN, GreenGisselbeth, DO     sodium bicarbonate 150 mEq in 5% dextrose for infusion, , Intravenous, Continuous, Dhaval Snyder MD, Last Rate: 100 mL/hr at 10/06/20 1128     sodium chloride (PF) 0.9% PF flush 3 mL, 3 mL, Intracatheter, q1 min prn, GreenGissel Alena, DO     sodium chloride (PF) 0.9% PF flush 3 mL, 3 mL, Intracatheter, Q8H, Green, Gissel Alena, DO, 3 mL at 10/06/20 0839     [Held by provider] spironolactone (ALDACTONE) tablet 25 mg, 25 mg, Oral, Daily, Green, Gissel Alena, DO     tamsulosin (FLOMAX) capsule 0.4 mg, 0.4 mg, Oral, Daily, Green, Gissel Alena, DO, 0.4 mg at 10/06/20 0828     [START ON 10/7/2020] thiamine (B-1) tablet 100 mg, 100 mg, Oral, Daily, GreenGissel Alena, DO     traZODone (DESYREL) tablet 100 mg, 100 mg, Oral, At Bedtime, GreenGisselzabeth, DO, 100 mg at 10/06/20 2207     vancomycin (VANCOCIN) capsule 125 mg, 125 mg, Oral, 4x Daily, Hanny Knowles MD     vortioxetine (TRINTELLIX) tablet 10 mg, 10 mg, Oral, Daily, GreenGisselbeth, DO, 10 mg at 10/06/20 1128     Social History:  Refer to the psychosocial assessment completed by the .  [facility]  He is currently unemployed.     Family History:    The patient denied a family history of mental illnesses or suicide attempts    Vital Signs:    B/P: 114/54, T: 98.7, P: 60, R: 18  Estimated body mass index is 26.63 kg/m  as calculated from the following:    Height as of this encounter: 1.702 m (5' 7\").    Weight as of this encounter: 77.1 kg (170 lb).       Mental status examination:  Appearance:  Alert, fair hygiene, no acute distress  Attitude:  Attempts to be cooperative  Eye Contact: Fair  Mood:  Depressed  Affect: Mood congruent and blunted  Speech:  Normal " rates, tone, latency, volume. Not pushed or pressured.  Psychomotor Behavior: Mild hand tremor is noted.  Thought Process: Linear and logical; not tangential or circumstantial or disorganized  Associations:  Logical; no loose associations Noted  Thought Content:  No obvious paranoia, delusions, ideas of reference, or grandiosity noted. Denies auditory or visual hallucinations. Denies suicidal Ideations. Denies homicidal ideations.  Insight:  Fair  Judgment:  Fair  Oriented to:  time, person, and place  Attention Span and Concentration:  Intact  Recent and Remote Memory: Intact based on interviewing and details provided  Language: Appropriate based on interviewing  Fund of Knowledge: Appropriate based on interviewing  Muscle Strength and Tone: Normal upon visual inspection       Diagnoses:    Alcohol use disorder, severe  Alcohol withdrawal  Substance induced mood disorder (alcohol related)  Major depressive disorder-recurrent, mild  Hepatic cirrhosis with ascites  BPH  Chronic kidney disease  Colitis     Recommendations:  CIWA with valium for management of alcohol withdrawal. Continue to encourage chemical dependency treatment and initiate a CD consult when he is agreeable.    Motivational interviewing was utilized today in hopes of gaining his desire to pursue chemical dependency treatment     Continue Trintellex for treatment of MDD/MARIA FERNANDA. Poor adherence to the medication in the setting of ongoing alcohol usage has complicated his response.      The patient is at low acute risk for suicide and does not require inpatient psychiatric hospitalization at this time.    Please reconsult with psychiatry as needed.   Jeff Brito MD   Text Page

## 2020-10-07 NOTE — PLAN OF CARE
Summary:     DATE & TIME: 10/6/20 0030-9113  Cognitive Concerns/ Orientation : A&Ox4   BEHAVIOR & AGGRESSION TOOL COLOR: Green  CIWA SCORE: 6, 8 (10 mg PO Valium given), 4  ABNL VS/O2: VSS on room air  MOBILITY: SBA, GB  PAIN MANAGMENT: Pain in abd and lower back, PRN dilaudid given x1.   DIET: Low fiber  BOWEL/BLADDER: Continent, up to BR  ABNL LAB/BG: Lactic 5.8, 4.3, and 1.8; Hgb 9.2  DRAIN/DEVICES: PIV infusing IVF  TELEMETRY RHYTHM: NA  SKIN: Bruised  TESTS/PROCEDURES: Positive for Cdiff.   D/C DAY/GOALS/PLACE: Pending   OTHER IMPORTANT INFO: Nicotine patch applied to R shoulder. PRN Seroquel given x1. IV Vanco.

## 2020-10-08 LAB
ALBUMIN SERPL-MCNC: 2.7 G/DL (ref 3.4–5)
ANION GAP SERPL CALCULATED.3IONS-SCNC: 2 MMOL/L (ref 3–14)
BASOPHILS # BLD AUTO: 0 10E9/L (ref 0–0.2)
BASOPHILS NFR BLD AUTO: 0.3 %
BUN SERPL-MCNC: 26 MG/DL (ref 7–30)
CALCIUM SERPL-MCNC: 7.9 MG/DL (ref 8.5–10.1)
CHLORIDE SERPL-SCNC: 105 MMOL/L (ref 94–109)
CO2 SERPL-SCNC: 28 MMOL/L (ref 20–32)
CREAT SERPL-MCNC: 1.82 MG/DL (ref 0.66–1.25)
DIFFERENTIAL METHOD BLD: ABNORMAL
EOSINOPHIL # BLD AUTO: 0 10E9/L (ref 0–0.7)
EOSINOPHIL NFR BLD AUTO: 0.6 %
ERYTHROCYTE [DISTWIDTH] IN BLOOD BY AUTOMATED COUNT: 15.8 % (ref 10–15)
GFR SERPL CREATININE-BSD FRML MDRD: 38 ML/MIN/{1.73_M2}
GLUCOSE SERPL-MCNC: 112 MG/DL (ref 70–99)
HCT VFR BLD AUTO: 27.2 % (ref 40–53)
HGB BLD-MCNC: 8.9 G/DL (ref 13.3–17.7)
IMM GRANULOCYTES # BLD: 0 10E9/L (ref 0–0.4)
IMM GRANULOCYTES NFR BLD: 0.3 %
LACTATE BLD-SCNC: 1.5 MMOL/L (ref 0.7–2)
LIPASE SERPL-CCNC: 298 U/L (ref 73–393)
LYMPHOCYTES # BLD AUTO: 0.3 10E9/L (ref 0.8–5.3)
LYMPHOCYTES NFR BLD AUTO: 8.9 %
MAGNESIUM SERPL-MCNC: 1.7 MG/DL (ref 1.6–2.3)
MCH RBC QN AUTO: 29.8 PG (ref 26.5–33)
MCHC RBC AUTO-ENTMCNC: 32.7 G/DL (ref 31.5–36.5)
MCV RBC AUTO: 91 FL (ref 78–100)
MONOCYTES # BLD AUTO: 0.5 10E9/L (ref 0–1.3)
MONOCYTES NFR BLD AUTO: 12.5 %
NEUTROPHILS # BLD AUTO: 2.8 10E9/L (ref 1.6–8.3)
NEUTROPHILS NFR BLD AUTO: 77.4 %
NRBC # BLD AUTO: 0 10*3/UL
NRBC BLD AUTO-RTO: 0 /100
PHOSPHATE SERPL-MCNC: 3.1 MG/DL (ref 2.5–4.5)
PLATELET # BLD AUTO: 75 10E9/L (ref 150–450)
POTASSIUM SERPL-SCNC: 3.8 MMOL/L (ref 3.4–5.3)
RBC # BLD AUTO: 2.99 10E12/L (ref 4.4–5.9)
SODIUM SERPL-SCNC: 135 MMOL/L (ref 133–144)
WBC # BLD AUTO: 3.6 10E9/L (ref 4–11)

## 2020-10-08 PROCEDURE — 36415 COLL VENOUS BLD VENIPUNCTURE: CPT | Performed by: NURSE PRACTITIONER

## 2020-10-08 PROCEDURE — 250N000011 HC RX IP 250 OP 636: Performed by: INTERNAL MEDICINE

## 2020-10-08 PROCEDURE — 85025 COMPLETE CBC W/AUTO DIFF WBC: CPT | Performed by: INTERNAL MEDICINE

## 2020-10-08 PROCEDURE — 250N000013 HC RX MED GY IP 250 OP 250 PS 637: Performed by: INTERNAL MEDICINE

## 2020-10-08 PROCEDURE — 83690 ASSAY OF LIPASE: CPT | Performed by: NURSE PRACTITIONER

## 2020-10-08 PROCEDURE — 83735 ASSAY OF MAGNESIUM: CPT | Performed by: INTERNAL MEDICINE

## 2020-10-08 PROCEDURE — 93005 ELECTROCARDIOGRAM TRACING: CPT

## 2020-10-08 PROCEDURE — 99232 SBSQ HOSP IP/OBS MODERATE 35: CPT | Performed by: INTERNAL MEDICINE

## 2020-10-08 PROCEDURE — 99291 CRITICAL CARE FIRST HOUR: CPT | Performed by: NURSE PRACTITIONER

## 2020-10-08 PROCEDURE — 83605 ASSAY OF LACTIC ACID: CPT | Performed by: NURSE PRACTITIONER

## 2020-10-08 PROCEDURE — 84484 ASSAY OF TROPONIN QUANT: CPT | Performed by: NURSE PRACTITIONER

## 2020-10-08 PROCEDURE — 250N000013 HC RX MED GY IP 250 OP 250 PS 637: Performed by: HOSPITALIST

## 2020-10-08 PROCEDURE — 80069 RENAL FUNCTION PANEL: CPT | Performed by: INTERNAL MEDICINE

## 2020-10-08 PROCEDURE — 93010 ELECTROCARDIOGRAM REPORT: CPT | Performed by: INTERNAL MEDICINE

## 2020-10-08 PROCEDURE — 120N000001 HC R&B MED SURG/OB

## 2020-10-08 PROCEDURE — 36415 COLL VENOUS BLD VENIPUNCTURE: CPT | Performed by: INTERNAL MEDICINE

## 2020-10-08 RX ADMIN — TAMSULOSIN HYDROCHLORIDE 0.4 MG: 0.4 CAPSULE ORAL at 08:58

## 2020-10-08 RX ADMIN — VANCOMYCIN HYDROCHLORIDE 125 MG: 125 CAPSULE ORAL at 18:53

## 2020-10-08 RX ADMIN — VANCOMYCIN HYDROCHLORIDE 125 MG: 125 CAPSULE ORAL at 08:58

## 2020-10-08 RX ADMIN — HYDROMORPHONE HYDROCHLORIDE 0.3 MG: 1 INJECTION, SOLUTION INTRAMUSCULAR; INTRAVENOUS; SUBCUTANEOUS at 11:38

## 2020-10-08 RX ADMIN — MIRTAZAPINE 30 MG: 15 TABLET, FILM COATED ORAL at 21:12

## 2020-10-08 RX ADMIN — TRAZODONE HYDROCHLORIDE 100 MG: 100 TABLET ORAL at 21:12

## 2020-10-08 RX ADMIN — PANTOPRAZOLE SODIUM 40 MG: 40 TABLET, DELAYED RELEASE ORAL at 07:25

## 2020-10-08 RX ADMIN — HYDROXYZINE HYDROCHLORIDE 50 MG: 25 TABLET, FILM COATED ORAL at 13:37

## 2020-10-08 RX ADMIN — VANCOMYCIN HYDROCHLORIDE 125 MG: 125 CAPSULE ORAL at 13:26

## 2020-10-08 RX ADMIN — VANCOMYCIN HYDROCHLORIDE 125 MG: 125 CAPSULE ORAL at 21:12

## 2020-10-08 RX ADMIN — QUETIAPINE FUMARATE 100 MG: 100 TABLET ORAL at 21:12

## 2020-10-08 RX ADMIN — MULTIPLE VITAMINS W/ MINERALS TAB 1 TABLET: TAB at 08:58

## 2020-10-08 RX ADMIN — OXYCODONE HYDROCHLORIDE 5 MG: 5 TABLET ORAL at 23:50

## 2020-10-08 RX ADMIN — OXYCODONE HYDROCHLORIDE 5 MG: 5 TABLET ORAL at 09:06

## 2020-10-08 RX ADMIN — HYDROMORPHONE HYDROCHLORIDE 0.3 MG: 1 INJECTION, SOLUTION INTRAMUSCULAR; INTRAVENOUS; SUBCUTANEOUS at 16:17

## 2020-10-08 RX ADMIN — NICOTINE 1 PATCH: 14 PATCH, EXTENDED RELEASE TRANSDERMAL at 09:00

## 2020-10-08 RX ADMIN — PANTOPRAZOLE SODIUM 40 MG: 40 TABLET, DELAYED RELEASE ORAL at 16:17

## 2020-10-08 RX ADMIN — VORTIOXETINE 10 MG: 10 TABLET, FILM COATED ORAL at 08:58

## 2020-10-08 RX ADMIN — ATENOLOL 25 MG: 25 TABLET ORAL at 08:58

## 2020-10-08 RX ADMIN — HYDROMORPHONE HYDROCHLORIDE 0.3 MG: 1 INJECTION, SOLUTION INTRAMUSCULAR; INTRAVENOUS; SUBCUTANEOUS at 00:32

## 2020-10-08 RX ADMIN — QUETIAPINE FUMARATE 100 MG: 100 TABLET ORAL at 00:46

## 2020-10-08 RX ADMIN — OXYCODONE HYDROCHLORIDE 5 MG: 5 TABLET ORAL at 13:37

## 2020-10-08 RX ADMIN — FOLIC ACID 1 MG: 1 TABLET ORAL at 08:58

## 2020-10-08 RX ADMIN — THIAMINE HCL TAB 100 MG 100 MG: 100 TAB at 08:58

## 2020-10-08 ASSESSMENT — ACTIVITIES OF DAILY LIVING (ADL)
ADLS_ACUITY_SCORE: 11
ADLS_ACUITY_SCORE: 11
ADLS_ACUITY_SCORE: 12
ADLS_ACUITY_SCORE: 14
ADLS_ACUITY_SCORE: 11
ADLS_ACUITY_SCORE: 12

## 2020-10-08 NOTE — PLAN OF CARE
DATE & TIME: 10/8/20 6766-8414  Cognitive Concerns/ Orientation : A&Ox4  BEHAVIOR & AGGRESSION TOOL COLOR: Green, cooperative, anxious at times  CIWA SCORE: 4/2 for tremor and anxiety   ABNL VS/O2: VSS on RA  MOBILITY: assist of 1, GB; up to BR  PAIN MANAGMENT: c/o  abd pain- medicated with Dilaudid x 1, oxycodone x 1, atarax x 1.  Medications help very little, has most pain with movement.  DIET: Low fiber, fair intake  BOWEL/BLADDER: Continent, up to BR, had formed BM  ABNL LAB/BG: creat 1.82, wbc 3.6, Hgb 8.9  DRAIN/DEVICES: PIV saline locked  TELEMETRY RHYTHM: NA  SKIN: Bruised/dry  TESTS/PROCEDURES: paracentesis tomorrow 10/9  D/C DAY/GOALS/PLACE: Pending progress/placement  OTHER IMPORTANT INFO: Positive for C-diff, continues on Vanco po, Nicotine patch on left arm

## 2020-10-08 NOTE — PLAN OF CARE
DATE & TIME: 10/7/20 4606-3486  Cognitive Concerns/ Orientation : A&Ox3, some intermittent confusion to situation  BEHAVIOR & AGGRESSION TOOL COLOR: Green, cooperative, anxious at times  CIWA SCORE: 3/2 for tremor and anxiety   ABNL VS/O2: VSS on RA  MOBILITY: assist of 1, GB; up to BR  PAIN MANAGMENT: c/o  abd pain- medicated with Dilaudid x 1 with good relief and PRN Seroquel given for agitation  DIET: Low fiber  BOWEL/BLADDER: Continent, up to BR  ABNL LAB/BG: phos 2.2, being replaced; Na 132, creat 1.98, wbc 2.8, Hgb 9.3  DRAIN/DEVICES: PIV NS at 100/hr infusing  TELEMETRY RHYTHM: NA  SKIN: Bruised/dry  TESTS/PROCEDURES: none  D/C DAY/GOALS/PLACE: Pending progress/placement  OTHER IMPORTANT INFO: Positive for C-diff, continues on Vanco po, Nicotine patch on right arm

## 2020-10-08 NOTE — PLAN OF CARE
DATE & TIME: 10/7/20 1444-8989  Cognitive Concerns/ Orientation : A&Ox3, some intermittent confusion to situation  BEHAVIOR & AGGRESSION TOOL COLOR: Green, cooperative, anxious at times  CIWA SCORE: 4/3 for tremor and anxiety   ABNL VS/O2: VSS on RA  MOBILITY: assist of 1, GB; up to chair/BR  PAIN MANAGMENT: c/o  abd pain- medicated with Oxycodone x 2 with good relief  DIET: Low fiber, good appetite  BOWEL/BLADDER: Continent, up to BR  ABNL LAB/BG: phos 2.2, being replaced; Na 132, creat 1.98, wbc 2.8, Hgb 9.3  DRAIN/DEVICES: PIV NS at 100/hr infusing  TELEMETRY RHYTHM: NA  SKIN: Bruised/dry  TESTS/PROCEDURES: none  D/C DAY/GOALS/PLACE: Pending progress/placement  OTHER IMPORTANT INFO: Positive for C-diff, continues on Vanco po, Nicotine patch on, Psych following.

## 2020-10-08 NOTE — PROGRESS NOTES
[facility]    Hospitalist Progress Note    Brief Summary:  This is a 65 year old male with history of hypertension, hyperlipidemia, depression, peripheral vascular disease, spinal stenosis, coronary artery disease, obstructive sleep apnea on CPAP, esophageal varices status post banding in the past, alcohol abuse admitted with diarrhea and alcohol intoxication.    Assessment & Plan        C. difficile colitis    ~24 hour history of diarrhea with associated abdominal pain PTA. No blood in stools. CT A/P with circumferential wall thickening of rectum suggesting distal colitis/proctitis. procalcitonin low at 0.07. WBC 7.7. Afebrile  Patient was initially started on IV fluids in the ER, I did put him on IV bicarb infusion as he  has metabolic acidosis as well.  Stool sent for C. difficile and enteric bacterial panel.  C. difficile come back positive.  Patient is now started on oral vancomycin 125 mg every 6 hours.  IV Cipro and Flagyl that was started on 10/6/2020 is discontinued now.  Still having some abdominal pain and tenderness but his diarrhea is now resolved.   He will need at least 10 to 14 days of oral vancomycin.  We will continue with that.  As no more diarrhea, will stop the IV fluids today, adequate oral intake.        Acute alcohol intoxication with history of alcohol dependence   Patient has been through treatment multiple times in the past.  He reports 1 week of drinking after recent discharge from 90-day treatment. He was discharged on 10/1 for intentional overdose and alcohol intoxication. Returned to drinking and presented 10/5, initially with alcohol level 0.33---0.19 when he returned second time.  No withdrawal symptoms at this time, he is on multivitamin, folic acid and thiamine.  He is on alcohol withdrawal protocol CIWA and give diazepam according to CIWA.    Metabolic acidosis  Elevated lactic acid  This is most likely secondary to diarrhea, he was started on IV bicarb  failure and drip.  His lactic acidosis and metabolic acidosis resolved at this time. discontinue IV fluids on 10/08     Anxiety and depression  PTA meds include trintellix  - Psychiatry consulted due to worsening depression. Not currently suicidal  - continue PTA trintellix and  Trazodone at this time. He use Atarax as needed as well.   Appreciate input from the Psychiatry.      Hx Intentional overdose  He took several pills of beta-blocker, tamsulosin which led to his 10/1 admission. He was not seen to be suicidal by psychiatry and hold was no longer needed and he was discharged  - Not suicidal during this admission no signs of intentional overdose this admission     Alcoholic cirrhosis of the liver with ascites  History of GI bleed.  Grade 1 esophageal varices with portal gastropathy  - Resume diuretics once diarrhea resolves  - Continue PTA atenolol, protonix  - only small ascites on CT abdomen on admission, but currently on IV fluids because of lactic acidosis and diarrhea.   If Ascites worsen will consider paracentesis but will like to avoid at this time, given C diff   - Last paracentesis 9/23, seems to get them every 10-14 days.  Does have some ascites today, will plan for paracentesis most likely on 10/09 and send fluid for analysis        Chronic kidney disease stage III, mild NGUYEN  Recent Baseline creatinine 1.4-1.9.  Currently 1.82   Holding PTA diuretics given large amount of diarrheal stools. Received IVF as well  Continue to hold diuretics, stop IV fluids today.      Anemia/Pancytopenia   This is mostly related to liver cirrhosis and alcohol abuse.   Hemoglobin of 8.9 in the setting of alcohol abuse, iron deficiency, CKD  No active bleeding at this time.      BPH  -Resume PTA flomax     Asymptomatic COVID-19 screening  Negative.     Stop IV fluids today.   Paracentesis on 10/09        DVT Prophylaxis: Pneumatic Compression Devices  Code Status: No CPR- Do NOT Intubate    Disposition: Expected discharge  in 1-2 days if remain stable.     Dhaval Snyder MD  Text Page  (7am - 6pm)    Interval History   No more diarrhea, making urine, has abdominal discomfort. No fever, chills, headache or dizziness, oral intake improving.    No other significant event overnight.     -Data reviewed today: I reviewed all new labs and imaging results over the last 24 hours. I personally reviewed no images or EKG's today.    Physical Exam   Temp: 98.5  F (36.9  C) Temp src: Oral BP: 125/66 Pulse: 57   Resp: 18 SpO2: 95 % O2 Device: None (Room air)    Vitals:    10/06/20 0224   Weight: 77.1 kg (170 lb)     Vital Signs with Ranges  Temp:  [98.2  F (36.8  C)-98.6  F (37  C)] 98.5  F (36.9  C)  Pulse:  [53-62] 57  Resp:  [16-18] 18  BP: (122-149)/(63-77) 125/66  SpO2:  [94 %-96 %] 95 %  I/O last 3 completed shifts:  In: 2084 [P.O.:840; I.V.:1244]  Out: 210 [Urine:210]    Constitutional: awake, alert, cooperative, no apparent distress, and appears stated age  Eyes: Lids and lashes normal, pupils equal, round and reactive to light, extra ocular muscles intact, sclera clear, conjunctiva normal  Respiratory: No increased work of breathing, good air exchange, clear to auscultation bilaterally, no crackles or wheezing  Cardiovascular: Normal apical impulse, regular rate and rhythm, normal S1 and S2, no S3 or S4, and no murmur noted  GI: soft, mild distended, SD positive, diffuse tenderness although better as compare to  before no organomegaly   Skin: no bruising or bleeding  Musculoskeletal: no lower extremity pitting edema present  Neurologic: no focal deficit.     Medications       atenolol  25 mg Oral Daily     folic acid  1 mg Oral Daily     mirtazapine  30 mg Oral At Bedtime     multivitamin w/minerals  1 tablet Oral Daily     nicotine  1 patch Transdermal Daily     nicotine   Transdermal Q8H     pantoprazole  40 mg Oral BID AC     sodium chloride (PF)  3 mL Intracatheter Q8H     [Held by provider] spironolactone  25 mg Oral Daily      tamsulosin  0.4 mg Oral Daily     thiamine  100 mg Oral Daily     traZODone  100 mg Oral At Bedtime     vancomycin  125 mg Oral 4x Daily     vortioxetine  10 mg Oral Daily       Data   Recent Labs   Lab 10/08/20  0846 10/07/20  0724 10/06/20  0228 10/05/20  1901   WBC 3.6* 2.8* 7.7 7.2   HGB 8.9* 9.3* 9.2* 10.3*   MCV 91 91 91 91   PLT 75* 81* 174 202   INR  --   --   --  1.22*    132* 136 144   POTASSIUM 3.8 3.4 4.1 4.4   CHLORIDE 105 100 106 110*   CO2 28 27 17* 17*   BUN 26 30 35* 36*   CR 1.82* 1.98* 1.87* 1.83*   ANIONGAP 2* 5 13 17*   KEV 7.9* 8.0* 7.9* 8.2*   * 106* 66* 62*   ALBUMIN 2.7* 3.0* 3.3* 3.3*   PROTTOTAL  --   --  7.3 7.3   BILITOTAL  --   --  1.1 0.9   ALKPHOS  --   --  183* 187*   ALT  --   --  23 22   AST  --   --  38 39   LIPASE  --   --   --  348       No results found for this or any previous visit (from the past 24 hour(s)).

## 2020-10-09 ENCOUNTER — APPOINTMENT (OUTPATIENT)
Dept: ULTRASOUND IMAGING | Facility: CLINIC | Age: 65
DRG: 372 | End: 2020-10-09
Attending: INTERNAL MEDICINE
Payer: MEDICARE

## 2020-10-09 ENCOUNTER — APPOINTMENT (OUTPATIENT)
Dept: GENERAL RADIOLOGY | Facility: CLINIC | Age: 65
DRG: 372 | End: 2020-10-09
Attending: INTERNAL MEDICINE
Payer: MEDICARE

## 2020-10-09 LAB
ALBUMIN FLD-MCNC: 0.7 G/DL
ALBUMIN SERPL-MCNC: 2.5 G/DL (ref 3.4–5)
ANION GAP SERPL CALCULATED.3IONS-SCNC: 2 MMOL/L (ref 3–14)
APPEARANCE FLD: NORMAL
BASOPHILS # BLD AUTO: 0 10E9/L (ref 0–0.2)
BASOPHILS NFR BLD AUTO: 0.2 %
BUN SERPL-MCNC: 22 MG/DL (ref 7–30)
CALCIUM SERPL-MCNC: 7.8 MG/DL (ref 8.5–10.1)
CHLORIDE SERPL-SCNC: 107 MMOL/L (ref 94–109)
CO2 SERPL-SCNC: 27 MMOL/L (ref 20–32)
COLOR FLD: YELLOW
CREAT SERPL-MCNC: 1.8 MG/DL (ref 0.66–1.25)
DIFFERENTIAL METHOD BLD: ABNORMAL
EOSINOPHIL # BLD AUTO: 0 10E9/L (ref 0–0.7)
EOSINOPHIL NFR BLD AUTO: 0.4 %
ERYTHROCYTE [DISTWIDTH] IN BLOOD BY AUTOMATED COUNT: 16 % (ref 10–15)
GFR SERPL CREATININE-BSD FRML MDRD: 38 ML/MIN/{1.73_M2}
GLUCOSE SERPL-MCNC: 120 MG/DL (ref 70–99)
HCT VFR BLD AUTO: 25.7 % (ref 40–53)
HGB BLD-MCNC: 8.5 G/DL (ref 13.3–17.7)
IMM GRANULOCYTES # BLD: 0 10E9/L (ref 0–0.4)
IMM GRANULOCYTES NFR BLD: 0 %
INTERPRETATION ECG - MUSE: NORMAL
LYMPHOCYTES # BLD AUTO: 0.4 10E9/L (ref 0.8–5.3)
LYMPHOCYTES NFR BLD AUTO: 8.8 %
LYMPHOCYTES NFR FLD MANUAL: 12 %
MAGNESIUM SERPL-MCNC: 1.8 MG/DL (ref 1.6–2.3)
MCH RBC QN AUTO: 30.2 PG (ref 26.5–33)
MCHC RBC AUTO-ENTMCNC: 33.1 G/DL (ref 31.5–36.5)
MCV RBC AUTO: 92 FL (ref 78–100)
MONOCYTES # BLD AUTO: 0.6 10E9/L (ref 0–1.3)
MONOCYTES NFR BLD AUTO: 14.1 %
MONOS+MACROS NFR FLD MANUAL: 12 %
NEUTROPHILS # BLD AUTO: 3.5 10E9/L (ref 1.6–8.3)
NEUTROPHILS NFR BLD AUTO: 76.5 %
NEUTS BAND NFR FLD MANUAL: 26 %
NRBC # BLD AUTO: 0 10*3/UL
NRBC BLD AUTO-RTO: 0 /100
OTHER CELLS FLD MANUAL: 50 %
PHOSPHATE SERPL-MCNC: 2.9 MG/DL (ref 2.5–4.5)
PLATELET # BLD AUTO: 71 10E9/L (ref 150–450)
POTASSIUM SERPL-SCNC: 3.7 MMOL/L (ref 3.4–5.3)
PROT FLD-MCNC: 1.4 G/DL
RBC # BLD AUTO: 2.81 10E12/L (ref 4.4–5.9)
SODIUM SERPL-SCNC: 136 MMOL/L (ref 133–144)
SPECIMEN SOURCE FLD: NORMAL
TROPONIN I SERPL-MCNC: <0.015 UG/L (ref 0–0.04)
WBC # BLD AUTO: 4.6 10E9/L (ref 4–11)
WBC # FLD AUTO: 206 /UL

## 2020-10-09 PROCEDURE — 88112 CYTOPATH CELL ENHANCE TECH: CPT | Mod: TC | Performed by: INTERNAL MEDICINE

## 2020-10-09 PROCEDURE — 80069 RENAL FUNCTION PANEL: CPT | Performed by: INTERNAL MEDICINE

## 2020-10-09 PROCEDURE — 36415 COLL VENOUS BLD VENIPUNCTURE: CPT | Performed by: NURSE PRACTITIONER

## 2020-10-09 PROCEDURE — 36415 COLL VENOUS BLD VENIPUNCTURE: CPT | Performed by: INTERNAL MEDICINE

## 2020-10-09 PROCEDURE — 87070 CULTURE OTHR SPECIMN AEROBIC: CPT | Performed by: INTERNAL MEDICINE

## 2020-10-09 PROCEDURE — 82042 OTHER SOURCE ALBUMIN QUAN EA: CPT | Performed by: INTERNAL MEDICINE

## 2020-10-09 PROCEDURE — 84484 ASSAY OF TROPONIN QUANT: CPT | Performed by: NURSE PRACTITIONER

## 2020-10-09 PROCEDURE — 88112 CYTOPATH CELL ENHANCE TECH: CPT | Mod: 26 | Performed by: PATHOLOGY

## 2020-10-09 PROCEDURE — 85025 COMPLETE CBC W/AUTO DIFF WBC: CPT | Performed by: INTERNAL MEDICINE

## 2020-10-09 PROCEDURE — 49083 ABD PARACENTESIS W/IMAGING: CPT

## 2020-10-09 PROCEDURE — 82040 ASSAY OF SERUM ALBUMIN: CPT | Performed by: INTERNAL MEDICINE

## 2020-10-09 PROCEDURE — 250N000013 HC RX MED GY IP 250 OP 250 PS 637: Performed by: HOSPITALIST

## 2020-10-09 PROCEDURE — 84157 ASSAY OF PROTEIN OTHER: CPT | Performed by: INTERNAL MEDICINE

## 2020-10-09 PROCEDURE — 88305 TISSUE EXAM BY PATHOLOGIST: CPT | Mod: TC | Performed by: INTERNAL MEDICINE

## 2020-10-09 PROCEDURE — 99233 SBSQ HOSP IP/OBS HIGH 50: CPT | Performed by: INTERNAL MEDICINE

## 2020-10-09 PROCEDURE — 999N000154 HC STATISTIC RADIOLOGY XRAY, US, CT, MAR, NM

## 2020-10-09 PROCEDURE — 83735 ASSAY OF MAGNESIUM: CPT | Performed by: INTERNAL MEDICINE

## 2020-10-09 PROCEDURE — 71045 X-RAY EXAM CHEST 1 VIEW: CPT

## 2020-10-09 PROCEDURE — 0W9G3ZZ DRAINAGE OF PERITONEAL CAVITY, PERCUTANEOUS APPROACH: ICD-10-PCS | Performed by: RADIOLOGY

## 2020-10-09 PROCEDURE — 89051 BODY FLUID CELL COUNT: CPT | Performed by: INTERNAL MEDICINE

## 2020-10-09 PROCEDURE — 83735 ASSAY OF MAGNESIUM: CPT | Performed by: NURSE PRACTITIONER

## 2020-10-09 PROCEDURE — 999N001018 HC STATISTIC H-CELL BLOCK W/STAIN: Performed by: INTERNAL MEDICINE

## 2020-10-09 PROCEDURE — 999N001014 HC STATISTIC CYTO WRIGHT STAIN TC: Performed by: INTERNAL MEDICINE

## 2020-10-09 PROCEDURE — 250N000011 HC RX IP 250 OP 636: Performed by: INTERNAL MEDICINE

## 2020-10-09 PROCEDURE — 88305 TISSUE EXAM BY PATHOLOGIST: CPT | Mod: 26 | Performed by: PATHOLOGY

## 2020-10-09 PROCEDURE — 250N000013 HC RX MED GY IP 250 OP 250 PS 637: Performed by: INTERNAL MEDICINE

## 2020-10-09 PROCEDURE — 120N000001 HC R&B MED SURG/OB

## 2020-10-09 RX ORDER — OXYCODONE HYDROCHLORIDE 5 MG/1
5-10 TABLET ORAL EVERY 4 HOURS PRN
Status: DISCONTINUED | OUTPATIENT
Start: 2020-10-09 | End: 2020-10-11 | Stop reason: HOSPADM

## 2020-10-09 RX ORDER — FUROSEMIDE 40 MG
40 TABLET ORAL DAILY
Status: DISCONTINUED | OUTPATIENT
Start: 2020-10-10 | End: 2020-10-11 | Stop reason: HOSPADM

## 2020-10-09 RX ORDER — LIDOCAINE HYDROCHLORIDE 10 MG/ML
10 INJECTION, SOLUTION EPIDURAL; INFILTRATION; INTRACAUDAL; PERINEURAL ONCE
Status: COMPLETED | OUTPATIENT
Start: 2020-10-09 | End: 2020-10-09

## 2020-10-09 RX ADMIN — VORTIOXETINE 10 MG: 10 TABLET, FILM COATED ORAL at 09:38

## 2020-10-09 RX ADMIN — TAMSULOSIN HYDROCHLORIDE 0.4 MG: 0.4 CAPSULE ORAL at 09:39

## 2020-10-09 RX ADMIN — FOLIC ACID 1 MG: 1 TABLET ORAL at 09:38

## 2020-10-09 RX ADMIN — THIAMINE HCL TAB 100 MG 100 MG: 100 TAB at 09:38

## 2020-10-09 RX ADMIN — VANCOMYCIN HYDROCHLORIDE 125 MG: 125 CAPSULE ORAL at 09:39

## 2020-10-09 RX ADMIN — NICOTINE 1 PATCH: 14 PATCH, EXTENDED RELEASE TRANSDERMAL at 09:39

## 2020-10-09 RX ADMIN — TRAZODONE HYDROCHLORIDE 100 MG: 100 TABLET ORAL at 21:00

## 2020-10-09 RX ADMIN — MULTIPLE VITAMINS W/ MINERALS TAB 1 TABLET: TAB at 09:39

## 2020-10-09 RX ADMIN — VANCOMYCIN HYDROCHLORIDE 125 MG: 125 CAPSULE ORAL at 17:31

## 2020-10-09 RX ADMIN — OXYCODONE HYDROCHLORIDE 5 MG: 5 TABLET ORAL at 21:00

## 2020-10-09 RX ADMIN — LIDOCAINE HYDROCHLORIDE 10 ML: 10 INJECTION, SOLUTION EPIDURAL; INFILTRATION; INTRACAUDAL; PERINEURAL at 13:49

## 2020-10-09 RX ADMIN — MIRTAZAPINE 30 MG: 15 TABLET, FILM COATED ORAL at 21:00

## 2020-10-09 RX ADMIN — VANCOMYCIN HYDROCHLORIDE 125 MG: 125 CAPSULE ORAL at 21:00

## 2020-10-09 RX ADMIN — ATENOLOL 25 MG: 25 TABLET ORAL at 09:38

## 2020-10-09 RX ADMIN — PANTOPRAZOLE SODIUM 40 MG: 40 TABLET, DELAYED RELEASE ORAL at 06:39

## 2020-10-09 RX ADMIN — METHOCARBAMOL 500 MG: 500 TABLET ORAL at 21:00

## 2020-10-09 RX ADMIN — VANCOMYCIN HYDROCHLORIDE 125 MG: 125 CAPSULE ORAL at 13:28

## 2020-10-09 RX ADMIN — HYDROMORPHONE HYDROCHLORIDE 0.3 MG: 1 INJECTION, SOLUTION INTRAMUSCULAR; INTRAVENOUS; SUBCUTANEOUS at 09:16

## 2020-10-09 RX ADMIN — PANTOPRAZOLE SODIUM 40 MG: 40 TABLET, DELAYED RELEASE ORAL at 16:18

## 2020-10-09 RX ADMIN — OXYCODONE HYDROCHLORIDE 5 MG: 5 TABLET ORAL at 16:18

## 2020-10-09 ASSESSMENT — ACTIVITIES OF DAILY LIVING (ADL)
ADLS_ACUITY_SCORE: 11

## 2020-10-09 NOTE — PLAN OF CARE
DATE & TIME: 10/8/20 9441-1533  Cognitive Concerns/ Orientation : A&Ox4  BEHAVIOR & AGGRESSION TOOL COLOR: Green, cooperative, anxious at times.  CIWA SCORE: 5, then slept rest of shift.   ABNL VS/O2: VSS on RA  MOBILITY: Ax1 GB.  PAIN MANAGMENT: C/o of abd pain, has most pain with movement or coughing. Denied chest pain this shift. PRN oxycodone given x1.   DIET: Low fiber  BOWEL/BLADDER: Continent, up to BR. No BM this shift.   ABNL LAB/BG: Creat 1.82, WBC 3.6, Hgb 8.9. Troponins negative.   DRAIN/DEVICES: PIV saline locked  TELEMETRY RHYTHM: SR w/BBB  SKIN: Bruised/dry  TESTS/PROCEDURES: Paracentesis planned for today.   D/C DAY/GOALS/PLACE: Pending progress/placement  OTHER IMPORTANT INFO: Positive for C-diff, continues on Vanco PO. Nicotine patch on left arm. RRT called yesterday evening d/t pt c/o CP.

## 2020-10-09 NOTE — CODE/RAPID RESPONSE
"[facility]    House NASH RRT Note  10/8/2020   Time Called: 2304    RRT called for: Chest pain    Assessment & Plan     L apical chest \"numbness\" with intermittent stabbing pain possibly 2/2 abdominal distention in setting of alcohol use disorder, alcoholic cirrhosis with ascites.  Alternatively considered Gastritis/GERD, ACS, PE, aortic dissection  - Upon arrival, pt lying in bed, awake, alert, in no apparent distress.  Pt reports L apical chest numbness that radiates up from his L hip.  Pt notes with intermittent movement that he develops a \"sharp\" pain in same location.  Pt reports mild nausea and SOB slightly worse than baseline.  Pt states he has not had this sensation before.  Pt notes abdominal pressure, pending paracentesis tomorrow.  Pt states intermittent diarrhea vs constipation.  Pt's VS noting HR 70s, SBP 160s, RR 10s, O2 sats 92% on RA.      Of note, when pt assisted to restroom, nursing notes pt immediately \"grabbed\" his lower abdomen stating the pain would radiate up to his chest.      INTERVENTIONS:  - Stat EKG  - Stat trop, will trend x3; stat lipase, lactic acid  - Telemetry monitoring for next 24 hours  - GI cocktail will be available PRN if pt requests  - Pt currently requesting oxycodone    At the end of the RRT pt remains hemodynamically stable, in no apparent distress, requesting for more pain medication.      Discussed with and defer further cares to nursing and hospitalist     Interval History     Jim Richard is a 65 year old male who was admitted on 10/6/2020 for diarrhea and alcohol intoxication.    Medical history significant for: HTN, HLP, depression, PVD, spinal stenosis, CAD, JANIS on CPAP    Code Status: No CPR- Do NOT Intubate    Allergies   Allergies   Allergen Reactions     Amlodipine Swelling     Lisinopril      Other reaction(s): Angioedema  Mouth and tongue swelling   Mouth and tongue swelling          Physical Exam   Vital Signs with " Ranges:  Temp:  [98.2  F (36.8  C)-98.8  F (37.1  C)] 98.8  F (37.1  C)  Pulse:  [57-68] 68  Resp:  [18-22] 22  BP: (125-151)/(66-77) 151/76  SpO2:  [93 %-96 %] 93 %  I/O last 3 completed shifts:  In: 2636 [P.O.:920; I.V.:1716]  Out: -     Constitutional: Pt lying in bed, awake, alert, in no apparent distress  Neck: No upper airway wheezes noted  Pulmonary: In no apparent respiratory distress, clear to auscultation bilaterally, no crackles or wheezes noted  Cardiovascular: Regular rate and rhythm, normal S1S2, no murmur, rub or gallop noted  GI: Round, distended, nontender to palpation, tympanic bowel sounds, no obvious guarding or rebound tenderness noted  Skin/Integumen: Warm, dry  Neuro: Awake, alert, clear speech, no focal neuro deficits noted  Psych:  Calm  Extremities: No peripheral edema noted    Data     EKG:  Interpreted by MADISON Thomas CNP  Time reviewed: 2335  Symptoms at time of EKG: Chest pain   Rhythm: normal sinus   Rate: Normal  Axis: Left Axis Deviation  Ectopy: none  Conduction: right bundle branch block (complete)  ST Segments/ T Waves: No acute ischemic changes  Q Waves: none  Comparison to prior: Unchanged from 9/30/20    Clinical Impression: no acute changes      Troponin:    Recent Labs   Lab Test 10/08/20  2340   TROPI <0.015        Recent Labs   Lab 10/08/20  2340 10/06/20  1955/20  0344   LACT 1.5 1.8 4.3*       Recent Labs   Lab 10/08/20  2340 10/05/20  1901   LIPASE 298 348          Time Spent on this Encounter   I spent 30 minutes of critical care time on the unit/floor managing the care of Jim Richard. Upon evaluation, this patient had a high probability of imminent or life-threatening deterioration due to chest pain, which required my direct attention, intervention, and personal management. 100% of my time was spent at the bedside counseling the patient and/or coordinating care regarding services listed in this note.    MADISON Thomas CNP  House NAS[facility]

## 2020-10-09 NOTE — PROGRESS NOTES
RADIOLOGY PROCEDURE NOTE  Patient name: Jim Richard  MRN: [id number]  : 1955    Pre-procedure diagnosis: Ascites  Post-procedure diagnosis: Same    Procedure Date/Time: 2020  2:29 PM  Procedure: Paracentesis  Estimated blood loss: None  Specimen(s) collected with description: Ascites.  The patient tolerated the procedure well with no immediate complications.    See imaging dictation for procedural details and findings.    Provider name: Sebastien Pereira MD  Assistant(s):None

## 2020-10-09 NOTE — PROGRESS NOTES
RRT called for chest pain. Pt describes it as a numbness over chest and when he moves it is a stabbing pain over chest. Rated it a 9/10. States this is a new pain he has never felt before. Tele, labs, EKG ordered. Will continue to monitor.

## 2020-10-09 NOTE — PLAN OF CARE
DATE & TIME: 10/9/2020 2087-4204  Cognitive Concerns/ Orientation : A&Ox4  BEHAVIOR & AGGRESSION TOOL COLOR: Green, cooperative, anxious at times.  CIWA SCORE: 3, 2, 2  ABNL VS/O2: HR mandi in 50's o/w VSS; 90's RA  MOBILITY: Ax1/GB.  PAIN MANAGMENT: C/o of abd pain, has most pain with movement or coughing. Denied chest pain this shift. PRN dilaudid given x 1 and oxycodone x 1.   DIET: Low fiber - appetite fair  BOWEL/BLADDER: Continent, up to BR. No BM this shift.   ABNL LAB/BG: Creat 1.8, Hgb 8.5  DRAIN/DEVICES: PIV saline locked  TELEMETRY RHYTHM: SR with BBB/PVC's  SKIN: Bruised/dry  TESTS/PROCEDURES: Paracentesis today - 3000cc removed; sitye is c/d/i  D/C DAY/GOALS/PLACE: Expected discharge in 1-2 days if remain stable  OTHER IMPORTANT INFO: Positive for C-diff, continues on Vanco PO. Nicotine patch on right shoulder; RRT called yesterday evening d/t pt c/o CP.

## 2020-10-09 NOTE — PLAN OF CARE
DATE & TIME: 10/8/20 3123-2292  Cognitive Concerns/ Orientation : A&Ox4  BEHAVIOR & AGGRESSION TOOL COLOR: Green, cooperative, anxious at times  CIWA SCORE: 3 and 3 for tremor and anxiety.   ABNL VS/O2: VSS on RA  MOBILITY: Assist of 1, GB; up to BR  PAIN MANAGMENT: C/o of abd pain, has most pain with movement or coughing. PRN Dilaudid given x1, PRN oxycodone available, PRN Atarax available. C/o chest pain, RRT called, see note.   DIET: Low fiber, fair intake  BOWEL/BLADDER: Continent, up to BR, BM today.   ABNL LAB/BG: Creat 1.82, WBC 3.6, Hgb 8.9  DRAIN/DEVICES: PIV saline locked  TELEMETRY RHYTHM: NA  SKIN: Bruised/dry  TESTS/PROCEDURES: Paracentesis tomorrow 10/9  D/C DAY/GOALS/PLACE: Pending progress/placement  OTHER IMPORTANT INFO: Positive for C-diff, continues on Vanco PO, Nicotine patch on left arm.

## 2020-10-09 NOTE — PROGRESS NOTES
[facility]    Hospitalist Progress Note    Brief Summary:  This is a 65 year old male with history of hypertension, hyperlipidemia, depression, peripheral vascular disease, spinal stenosis, coronary artery disease, obstructive sleep apnea on CPAP, esophageal varices status post banding in the past, alcohol abuse admitted with diarrhea and alcohol intoxication.    Assessment & Plan        C. difficile colitis    ~24 hour history of diarrhea with associated abdominal pain PTA. No blood in stools. CT A/P with circumferential wall thickening of rectum suggesting distal colitis/proctitis. procalcitonin low at 0.07. WBC 7.7. Afebrile  Patient was initially started on IV fluids in the ER, I did put him on IV bicarb infusion as he  has metabolic acidosis as well.  Stool sent for C. difficile and enteric bacterial panel.  C. difficile come back positive.  Patient is now started on oral vancomycin 125 mg every 6 hours.  IV Cipro and Flagyl that was started on 10/6/2020 is discontinued now.  Still having some abdominal pain and tenderness diarrhea is improve but still having few loose stools.  He will need at least 10 to 14 days of oral vancomycin.  We will continue with that.  Continue oral Vancomycin at this time.        Acute alcohol intoxication with history of alcohol dependence   Patient has been through treatment multiple times in the past.  He reports 1 week of drinking after recent discharge from 90-day treatment. He was discharged on 10/1 for intentional overdose and alcohol intoxication. Returned to drinking and presented 10/5, initially with alcohol level 0.33---0.19 when he returned second time.  No withdrawal symptoms at this time, he is on multivitamin, folic acid and thiamine.  He is on alcohol withdrawal protocol CIWA and give diazepam according to CIWA.  No significant withdrawal at this time.     Metabolic acidosis  Elevated lactic acid  This is most likely secondary to diarrhea, he  was started on IV bicarb failure and drip.  His lactic acidosis and metabolic acidosis resolved at this time. discontinue IV fluids on 10/08     Anxiety and depression  PTA meds include trintellix  - Psychiatry consulted due to worsening depression. Not currently suicidal  - continue PTA trintellix and  Trazodone at this time. He use Atarax as needed as well.   Appreciate input from the Psychiatry.      Hx Intentional overdose: Not during this admission.   He took several pills of beta-blocker, tamsulosin which led to his 10/1 admission. He was not seen to be suicidal by psychiatry and hold was no longer needed and he was discharged  - Not suicidal during this admission no signs of intentional overdose this admission     Alcoholic cirrhosis of the liver with ascites  History of GI bleed.  Grade 1 esophageal varices with portal gastropathy  - Resume diuretics once diarrhea resolves  - Continue PTA atenolol, protonix  - only small ascites on CT abdomen on admission, but currently on IV fluids because of lactic acidosis and diarrhea.   If Ascites worsen will consider paracentesis but will like to avoid at this time, given C diff   - Last paracentesis 9/23, seems to get them every 10-14 days.  Worsening ascites, abdomen is more distended now and tender, US guided Paracentesis ordered for both therapeutic and diagnostic tap, will send fluid for analysis.       Chronic kidney disease stage III, mild NGUYEN  Recent Baseline creatinine 1.4-1.9.  Currently 1.80 and stable.    Holding PTA diuretics given large amount of diarrheal stools. Received IVF as well  Continue to hold diuretics, stop IV fluids now, will consider restarting his diuretics if diarrhea  Continue to improve.     Anemia/Pancytopenia   This is mostly related to liver cirrhosis and alcohol abuse.   Hemoglobin of 8.95in the setting of alcohol abuse, iron deficiency, CKD  No active bleeding at this time. WBC improve today, Platelets and Hb is overall stable.       BPH  -Resume PTA flomax    Chest pain  Atypical, reproducible on exam, will do Xray chest today, EKG done overnight reviewed no   Acute ischemic changes and troponin X 2 negative.      Asymptomatic COVID-19 screening  Negative.     Paracentesis therapeutic and diagnostic today  Xray chest   Increase oxycodone to 5-10 mg Q 4 hr PRN for pain control.         DVT Prophylaxis: Pneumatic Compression Devices  Code Status: No CPR- Do NOT Intubate    Disposition: Expected discharge in 1-2 days if remain stable.     Dhaval Snyder MD  Text Page  (7am - 6pm)    Interval History   Patient not feeling well this morning, as his abdomen is more distended, has some chest pain overnight dull and worse with movement, no fever, chills, headache or dizziness, has BM which is still loose.     Overnight events reviewed with the nursing staff taking care of the patient.     -Data reviewed today: I reviewed all new labs and imaging results over the last 24 hours. I personally reviewed no images or EKG's today.    Physical Exam   Temp: 98.4  F (36.9  C) Temp src: Oral BP: 134/66 Pulse: 58   Resp: 20 SpO2: 95 % O2 Device: None (Room air)    Vitals:    10/06/20 0224   Weight: 77.1 kg (170 lb)     Vital Signs with Ranges  Temp:  [98.2  F (36.8  C)-99.8  F (37.7  C)] 98.4  F (36.9  C)  Pulse:  [57-68] 58  Resp:  [18-24] 20  BP: (112-160)/(51-83) 134/66  SpO2:  [93 %-95 %] 95 %  I/O last 3 completed shifts:  In: 2636 [P.O.:920; I.V.:1716]  Out: -     Constitutional: awake, alert, cooperative, no apparent distress, and appears stated age  Eyes: Lids and lashes normal, pupils equal, round and reactive to light, extra ocular muscles intact, sclera clear, conjunctiva normal  Respiratory: No increased work of breathing, good air exchange, clear to auscultation bilaterally, no crackles or wheezing  Tenderness to left anterior chest.   Cardiovascular: Normal apical impulse, regular rate and rhythm, normal S1 and S2, no S3 or S4, and no murmur  noted  GI: soft, but moredistended, SD positive, diffuse tenderness. no organomegaly   Skin: no bruising or bleeding  Musculoskeletal: no lower extremity pitting edema present  Neurologic: no focal deficit.     Medications       atenolol  25 mg Oral Daily     folic acid  1 mg Oral Daily     lidocaine (buffered or not buffered)  10 mL Intradermal Once     mirtazapine  30 mg Oral At Bedtime     multivitamin w/minerals  1 tablet Oral Daily     nicotine  1 patch Transdermal Daily     nicotine   Transdermal Q8H     pantoprazole  40 mg Oral BID AC     sodium chloride (PF)  3 mL Intracatheter Q8H     [Held by provider] spironolactone  25 mg Oral Daily     tamsulosin  0.4 mg Oral Daily     thiamine  100 mg Oral Daily     traZODone  100 mg Oral At Bedtime     vancomycin  125 mg Oral 4x Daily     vortioxetine  10 mg Oral Daily       Data   Recent Labs   Lab 10/09/20  0723 10/09/20  0335 10/08/20  2340 10/08/20  0846 10/07/20  0724 10/06/20  0228 10/05/20  1901   WBC 4.6  --   --  3.6* 2.8* 7.7 7.2   HGB 8.5*  --   --  8.9* 9.3* 9.2* 10.3*   MCV 92  --   --  91 91 91 91   PLT 71*  --   --  75* 81* 174 202   INR  --   --   --   --   --   --  1.22*     --   --  135 132* 136 144   POTASSIUM 3.7  --   --  3.8 3.4 4.1 4.4   CHLORIDE 107  --   --  105 100 106 110*   CO2 27  --   --  28 27 17* 17*   BUN 22  --   --  26 30 35* 36*   CR 1.80*  --   --  1.82* 1.98* 1.87* 1.83*   ANIONGAP 2*  --   --  2* 5 13 17*   KEV 7.8*  --   --  7.9* 8.0* 7.9* 8.2*   *  --   --  112* 106* 66* 62*   ALBUMIN 2.5*  --   --  2.7* 3.0* 3.3* 3.3*   PROTTOTAL  --   --   --   --   --  7.3 7.3   BILITOTAL  --   --   --   --   --  1.1 0.9   ALKPHOS  --   --   --   --   --  183* 187*   ALT  --   --   --   --   --  23 22   AST  --   --   --   --   --  38 39   LIPASE  --   --  298  --   --   --  348   TROPI <0.015 <0.015 <0.015  --   --   --   --        No results found for this or any previous visit (from the past 24 hour(s)).

## 2020-10-09 NOTE — PROGRESS NOTES
Care Suites Procedure Nursing Note    Patient Information  Name: Jim Richard  Age: 65 year old    Procedure  Procedure: Paracentesis.  Informed consent obtained 1347  Time out done 1348  Procedure start time: 1349  Procedure complete time: 1403  Concerns/abnormal assessment: None at this time.  If abnormal assessment, provider notified: N/A  Plan/Other: Transfer back to station 66 and per post procedure orders.    Mikayla Downey, RN     Paracentesis: Pt tolerated well. VSS. 3,000 cc Clear yellow fluid removed from abdomen w/o difficulty. Bandaid applied to site - CDI.    1407 Pt back to rm [id number] per cart & transport.

## 2020-10-10 LAB
ALBUMIN SERPL-MCNC: 2.5 G/DL (ref 3.4–5)
ANION GAP SERPL CALCULATED.3IONS-SCNC: 6 MMOL/L (ref 3–14)
BASOPHILS # BLD AUTO: 0 10E9/L (ref 0–0.2)
BASOPHILS NFR BLD AUTO: 0.4 %
BUN SERPL-MCNC: 20 MG/DL (ref 7–30)
CALCIUM SERPL-MCNC: 7.8 MG/DL (ref 8.5–10.1)
CHLORIDE SERPL-SCNC: 106 MMOL/L (ref 94–109)
CO2 SERPL-SCNC: 20 MMOL/L (ref 20–32)
CREAT SERPL-MCNC: 1.73 MG/DL (ref 0.66–1.25)
DIFFERENTIAL METHOD BLD: ABNORMAL
EOSINOPHIL # BLD AUTO: 0 10E9/L (ref 0–0.7)
EOSINOPHIL NFR BLD AUTO: 0.2 %
ERYTHROCYTE [DISTWIDTH] IN BLOOD BY AUTOMATED COUNT: 16.3 % (ref 10–15)
GFR SERPL CREATININE-BSD FRML MDRD: 40 ML/MIN/{1.73_M2}
GLUCOSE SERPL-MCNC: 119 MG/DL (ref 70–99)
HCT VFR BLD AUTO: 27.8 % (ref 40–53)
HGB BLD-MCNC: 9.1 G/DL (ref 13.3–17.7)
IMM GRANULOCYTES # BLD: 0 10E9/L (ref 0–0.4)
IMM GRANULOCYTES NFR BLD: 0.2 %
LYMPHOCYTES # BLD AUTO: 0.4 10E9/L (ref 0.8–5.3)
LYMPHOCYTES NFR BLD AUTO: 6.5 %
MCH RBC QN AUTO: 30.3 PG (ref 26.5–33)
MCHC RBC AUTO-ENTMCNC: 32.7 G/DL (ref 31.5–36.5)
MCV RBC AUTO: 93 FL (ref 78–100)
MONOCYTES # BLD AUTO: 0.9 10E9/L (ref 0–1.3)
MONOCYTES NFR BLD AUTO: 16.7 %
NEUTROPHILS # BLD AUTO: 4.1 10E9/L (ref 1.6–8.3)
NEUTROPHILS NFR BLD AUTO: 76 %
NRBC # BLD AUTO: 0 10*3/UL
NRBC BLD AUTO-RTO: 0 /100
PHOSPHATE SERPL-MCNC: 3 MG/DL (ref 2.5–4.5)
PLATELET # BLD AUTO: 54 10E9/L (ref 150–450)
POTASSIUM SERPL-SCNC: 4 MMOL/L (ref 3.4–5.3)
RBC # BLD AUTO: 3 10E12/L (ref 4.4–5.9)
SODIUM SERPL-SCNC: 132 MMOL/L (ref 133–144)
WBC # BLD AUTO: 5.4 10E9/L (ref 4–11)

## 2020-10-10 PROCEDURE — 36415 COLL VENOUS BLD VENIPUNCTURE: CPT | Performed by: INTERNAL MEDICINE

## 2020-10-10 PROCEDURE — 80069 RENAL FUNCTION PANEL: CPT | Performed by: INTERNAL MEDICINE

## 2020-10-10 PROCEDURE — 99232 SBSQ HOSP IP/OBS MODERATE 35: CPT | Performed by: STUDENT IN AN ORGANIZED HEALTH CARE EDUCATION/TRAINING PROGRAM

## 2020-10-10 PROCEDURE — 250N000013 HC RX MED GY IP 250 OP 250 PS 637: Performed by: HOSPITALIST

## 2020-10-10 PROCEDURE — 85025 COMPLETE CBC W/AUTO DIFF WBC: CPT | Performed by: INTERNAL MEDICINE

## 2020-10-10 PROCEDURE — 120N000001 HC R&B MED SURG/OB

## 2020-10-10 PROCEDURE — 250N000013 HC RX MED GY IP 250 OP 250 PS 637: Performed by: INTERNAL MEDICINE

## 2020-10-10 RX ADMIN — QUETIAPINE FUMARATE 100 MG: 100 TABLET ORAL at 17:00

## 2020-10-10 RX ADMIN — VANCOMYCIN HYDROCHLORIDE 125 MG: 125 CAPSULE ORAL at 21:12

## 2020-10-10 RX ADMIN — VANCOMYCIN HYDROCHLORIDE 125 MG: 125 CAPSULE ORAL at 17:03

## 2020-10-10 RX ADMIN — TRAZODONE HYDROCHLORIDE 100 MG: 100 TABLET ORAL at 21:12

## 2020-10-10 RX ADMIN — VANCOMYCIN HYDROCHLORIDE 125 MG: 125 CAPSULE ORAL at 11:37

## 2020-10-10 RX ADMIN — THIAMINE HCL TAB 100 MG 100 MG: 100 TAB at 08:14

## 2020-10-10 RX ADMIN — VANCOMYCIN HYDROCHLORIDE 125 MG: 125 CAPSULE ORAL at 08:15

## 2020-10-10 RX ADMIN — ATENOLOL 25 MG: 25 TABLET ORAL at 08:15

## 2020-10-10 RX ADMIN — PANTOPRAZOLE SODIUM 40 MG: 40 TABLET, DELAYED RELEASE ORAL at 17:00

## 2020-10-10 RX ADMIN — MULTIPLE VITAMINS W/ MINERALS TAB 1 TABLET: TAB at 08:14

## 2020-10-10 RX ADMIN — OXYCODONE HYDROCHLORIDE 5 MG: 5 TABLET ORAL at 11:37

## 2020-10-10 RX ADMIN — FUROSEMIDE 40 MG: 40 TABLET ORAL at 08:14

## 2020-10-10 RX ADMIN — QUETIAPINE FUMARATE 100 MG: 100 TABLET ORAL at 06:04

## 2020-10-10 RX ADMIN — PANTOPRAZOLE SODIUM 40 MG: 40 TABLET, DELAYED RELEASE ORAL at 08:15

## 2020-10-10 RX ADMIN — NICOTINE 1 PATCH: 14 PATCH, EXTENDED RELEASE TRANSDERMAL at 08:16

## 2020-10-10 RX ADMIN — VORTIOXETINE 10 MG: 10 TABLET, FILM COATED ORAL at 08:14

## 2020-10-10 RX ADMIN — TAMSULOSIN HYDROCHLORIDE 0.4 MG: 0.4 CAPSULE ORAL at 08:15

## 2020-10-10 RX ADMIN — OXYCODONE HYDROCHLORIDE 5 MG: 5 TABLET ORAL at 21:12

## 2020-10-10 RX ADMIN — MIRTAZAPINE 30 MG: 15 TABLET, FILM COATED ORAL at 21:12

## 2020-10-10 RX ADMIN — FOLIC ACID 1 MG: 1 TABLET ORAL at 08:14

## 2020-10-10 ASSESSMENT — ACTIVITIES OF DAILY LIVING (ADL)
ADLS_ACUITY_SCORE: 13

## 2020-10-10 NOTE — PLAN OF CARE
DATE & TIME: 10/9 from 1900 to 0730    Cognitive Concerns/ Orientation : A&O x 4   BEHAVIOR & AGGRESSION TOOL COLOR: Green, calm and cooperative for cares. Anxious at times  CIWA SCORE: 2   ABNL VS/O2: VSS on RA except bradycardic  MOBILITY: Up x 1 assist to bathroom with walker/GB  PAIN MANAGMENT: c/o abdominal pain, given oxycodone x 1 and Robaxin x 1  DIET: Law fiber diet  BOWEL/BLADDER: Continent to B/B  ABNL LAB/BG: Cr 1.8, Hgb 8.5, Albumin 2.5, Ca 7.8, Platelet 71  DRAIN/DEVICES: PIV saline lock  TELEMETRY RHYTHM: Tele SB with BBB/PAC  SKIN: Bruised, scabbed, skin tear on right hand, Dressing on paracentesis side CDI  TESTS/PROCEDURES: had paracentesis done 10/9, removed 3000 cc  D/C DAY/GOALS/PLACE: Discharge 1-2 days pending in progress  OTHER IMPORTANT INFO: Positive C-diff, receiving PO Vancomycin. Nicotine patch on right deltoid. Enteric isolation maintained.   MD/RN ROUNDING SIGNED OFF D/E SHIFT: N/A  COMMIT TO SIT DONE AND SIGNED OFF Yes

## 2020-10-10 NOTE — PLAN OF CARE
DATE & TIME: 10/10/2020 1203-5423  Cognitive Concerns/ Orientation : A&Ox4  BEHAVIOR & AGGRESSION TOOL COLOR: Green, cooperative, anxious at times.  CIWA SCORE: 1, 1 - now discontinued  ABNL VS/O2: HR mandi in 50's o/w VSS; 90's RA  MOBILITY: Ax1/GB/walker  PAIN MANAGMENT: c/o of abd pain, has most pain with movement or coughing; no CP; PRN oxycodone given x1.   DIET: Low fiber - appetite fair  BOWEL/BLADDER: Continent, up to BR; BM x 3  ABNL LAB/BG: Creat 1.73, Hgb 9.1, Na+ 132  DRAIN/DEVICES: PIV saline locked  TELEMETRY RHYTHM: SR with BBB/PVC's  SKIN: Bruised/dry  TESTS/PROCEDURES: Paracentesis yesterday - 3000cc removed; site is c/d/i  D/C DAY/GOALS/PLACE: Expected discharge tomorrow 10/11/2020.  OTHER IMPORTANT INFO: Positive for C-diff, continues on Vanco PO. Nicotine patch on left arm.

## 2020-10-11 VITALS
SYSTOLIC BLOOD PRESSURE: 127 MMHG | WEIGHT: 178.35 LBS | RESPIRATION RATE: 18 BRPM | OXYGEN SATURATION: 92 % | TEMPERATURE: 99.1 F | BODY MASS INDEX: 27.99 KG/M2 | DIASTOLIC BLOOD PRESSURE: 67 MMHG | HEART RATE: 56 BPM | HEIGHT: 67 IN

## 2020-10-11 PROCEDURE — 250N000013 HC RX MED GY IP 250 OP 250 PS 637: Performed by: HOSPITALIST

## 2020-10-11 PROCEDURE — 99238 HOSP IP/OBS DSCHRG MGMT 30/<: CPT | Performed by: STUDENT IN AN ORGANIZED HEALTH CARE EDUCATION/TRAINING PROGRAM

## 2020-10-11 PROCEDURE — 250N000013 HC RX MED GY IP 250 OP 250 PS 637: Performed by: INTERNAL MEDICINE

## 2020-10-11 RX ORDER — VANCOMYCIN HYDROCHLORIDE 125 MG/1
125 CAPSULE ORAL 4 TIMES DAILY
Qty: 40 CAPSULE | Refills: 0 | Status: ON HOLD | OUTPATIENT
Start: 2020-10-11 | End: 2020-11-18

## 2020-10-11 RX ORDER — SPIRONOLACTONE 25 MG/1
25 TABLET ORAL DAILY
Status: ON HOLD
Start: 2020-10-11 | End: 2020-11-18

## 2020-10-11 RX ADMIN — FOLIC ACID 1 MG: 1 TABLET ORAL at 08:44

## 2020-10-11 RX ADMIN — VORTIOXETINE 10 MG: 10 TABLET, FILM COATED ORAL at 08:44

## 2020-10-11 RX ADMIN — NICOTINE 1 PATCH: 14 PATCH, EXTENDED RELEASE TRANSDERMAL at 08:45

## 2020-10-11 RX ADMIN — TAMSULOSIN HYDROCHLORIDE 0.4 MG: 0.4 CAPSULE ORAL at 08:44

## 2020-10-11 RX ADMIN — VANCOMYCIN HYDROCHLORIDE 125 MG: 125 CAPSULE ORAL at 08:44

## 2020-10-11 RX ADMIN — ATENOLOL 25 MG: 25 TABLET ORAL at 08:44

## 2020-10-11 RX ADMIN — FUROSEMIDE 40 MG: 40 TABLET ORAL at 08:44

## 2020-10-11 RX ADMIN — THIAMINE HCL TAB 100 MG 100 MG: 100 TAB at 08:44

## 2020-10-11 RX ADMIN — Medication 1 MG: at 05:07

## 2020-10-11 RX ADMIN — PANTOPRAZOLE SODIUM 40 MG: 40 TABLET, DELAYED RELEASE ORAL at 08:44

## 2020-10-11 RX ADMIN — MULTIPLE VITAMINS W/ MINERALS TAB 1 TABLET: TAB at 08:44

## 2020-10-11 RX ADMIN — HYDROXYZINE HYDROCHLORIDE 50 MG: 25 TABLET, FILM COATED ORAL at 01:33

## 2020-10-11 ASSESSMENT — ACTIVITIES OF DAILY LIVING (ADL)
ADLS_ACUITY_SCORE: 13

## 2020-10-11 ASSESSMENT — MIFFLIN-ST. JEOR: SCORE: 1547.63

## 2020-10-11 NOTE — PLAN OF CARE
Discharge    Patient discharged to home via [facility].     Care plan note:  Abdomen is distended; BS/flatus/BM (+).  Patient c/o abdominal pain relieved with Oxycodone prn.  Patient had paracentesis 2 days ago.  Site is c/d/I.  Patient is tolerating low fiber diet well.  VSS; O2sats 90's RA.  No new complaints.    Listed belongings gathered and returned to patient. Yes  Care Plan and Patient education resolved: Yes  Prescriptions if needed, hard copies sent with patient  NA  Home and hospital acquired medications returned to patient: NA  Medication Bin checked and emptied on discharge Yes  Follow up appointment made for patient: No

## 2020-10-11 NOTE — PLAN OF CARE
DATE & TIME: 10/10 from 1900 to 0730    Cognitive Concerns/ Orientation : A&O x 4   BEHAVIOR & AGGRESSION TOOL COLOR: Green  CIWA SCORE: N/A   ABNL VS/O2: VSS on RA except bradycardic  MOBILITY: Up x 1 assist to bathroom  PAIN MANAGMENT: c/o abdominal pain, given Oxycodone x 1  DIET: Law fiber diet  BOWEL/BLADDER: Continent to B/B  ABNL LAB/BG: Na 132, Cr 1.73, Albumin 2.5, ,, Hgb 9.1, Platelet 54  DRAIN/DEVICES: PIV saline lock  TELEMETRY RHYTHM: SR with BBB  SKIN: Bruised, scabbed, Abdomen slightly distended.   TESTS/PROCEDURES: Paracentesis on 10/9. Removed 3000 cc  D/C DAY/GOALS/PLACE: Discharge pending in progress  OTHER IMPORTANT INFO: Enteric isolation maintained.    MD/RN ROUNDING SIGNED OFF D/E SHIFT: N/A  COMMIT TO SIT DONE AND SIGNED OFF Yes

## 2020-10-11 NOTE — PROGRESS NOTES
Care Management Follow Up Note    Length of Stay (days) 5    Patient plan of care discussed at Interdisciplinary Rounds: YEIMY  Expected Discharge Date: 10/08/20  Concerns to be Addressed:   SW spoke with patient about transportation. Patient does not have funds to secure a ride at discharge and insurance does not cover transportation. SW informed patient of transport with [facility] [phone] at 1130. Obtained verbal permission from patient to share PHI with [facility] [employer] for the purpose of arranging transport.     Anticipated Discharge Disposition:  To home at [address] [facility] [address]  Anticipated Discharge Services:  NA  Anticipated Discharge DME:  YEIMY     Plan:  To discharge to home via [facility] [employer] at 1130.     MIRTA Perez

## 2020-10-13 ENCOUNTER — NURSE TRIAGE (OUTPATIENT)
Dept: NURSING | Facility: CLINIC | Age: 65
End: 2020-10-13

## 2020-10-13 LAB — COPATH REPORT: NORMAL

## 2020-10-14 ENCOUNTER — HOSPITAL ENCOUNTER (INPATIENT)
Facility: CLINIC | Age: 65
LOS: 35 days | Discharge: PSYCHIATRIC OR SUBSTANCE USE FACILITY | DRG: 896 | End: 2020-11-18
Attending: EMERGENCY MEDICINE | Admitting: INTERNAL MEDICINE
Payer: MEDICARE

## 2020-10-14 DIAGNOSIS — F10.920 ALCOHOLIC INTOXICATION WITHOUT COMPLICATION (H): ICD-10-CM

## 2020-10-14 DIAGNOSIS — F33.2 SEVERE EPISODE OF RECURRENT MAJOR DEPRESSIVE DISORDER, WITHOUT PSYCHOTIC FEATURES (H): ICD-10-CM

## 2020-10-14 DIAGNOSIS — A04.72 C. DIFFICILE COLITIS: ICD-10-CM

## 2020-10-14 DIAGNOSIS — E87.20 LACTIC ACIDOSIS: ICD-10-CM

## 2020-10-14 LAB
ALBUMIN SERPL-MCNC: 3 G/DL (ref 3.4–5)
ALP SERPL-CCNC: 177 U/L (ref 40–150)
ALT SERPL W P-5'-P-CCNC: 20 U/L (ref 0–70)
ANION GAP SERPL CALCULATED.3IONS-SCNC: 10 MMOL/L (ref 3–14)
AST SERPL W P-5'-P-CCNC: 36 U/L (ref 0–45)
BACTERIA SPEC CULT: NO GROWTH
BASOPHILS # BLD AUTO: 0.1 10E9/L (ref 0–0.2)
BASOPHILS NFR BLD AUTO: 1.1 %
BILIRUB SERPL-MCNC: 0.6 MG/DL (ref 0.2–1.3)
BUN SERPL-MCNC: 27 MG/DL (ref 7–30)
CALCIUM SERPL-MCNC: 8.2 MG/DL (ref 8.5–10.1)
CHLORIDE SERPL-SCNC: 110 MMOL/L (ref 94–109)
CO2 SERPL-SCNC: 22 MMOL/L (ref 20–32)
CREAT SERPL-MCNC: 1.63 MG/DL (ref 0.66–1.25)
DIFFERENTIAL METHOD BLD: ABNORMAL
EOSINOPHIL # BLD AUTO: 0 10E9/L (ref 0–0.7)
EOSINOPHIL NFR BLD AUTO: 0.4 %
ERYTHROCYTE [DISTWIDTH] IN BLOOD BY AUTOMATED COUNT: 16.5 % (ref 10–15)
ETHANOL SERPL-MCNC: 0.36 G/DL
GFR SERPL CREATININE-BSD FRML MDRD: 43 ML/MIN/{1.73_M2}
GLUCOSE SERPL-MCNC: 76 MG/DL (ref 70–99)
HCT VFR BLD AUTO: 30.8 % (ref 40–53)
HGB BLD-MCNC: 10.2 G/DL (ref 13.3–17.7)
IMM GRANULOCYTES # BLD: 0 10E9/L (ref 0–0.4)
IMM GRANULOCYTES NFR BLD: 0.4 %
LACTATE BLD-SCNC: 1.2 MMOL/L (ref 0.7–2)
LACTATE BLD-SCNC: 3.9 MMOL/L (ref 0.7–2)
LIPASE SERPL-CCNC: 236 U/L (ref 73–393)
LYMPHOCYTES # BLD AUTO: 0.5 10E9/L (ref 0.8–5.3)
LYMPHOCYTES NFR BLD AUTO: 9.3 %
MAGNESIUM SERPL-MCNC: 2.4 MG/DL (ref 1.6–2.3)
MAGNESIUM SERPL-MCNC: 2.5 MG/DL (ref 1.6–2.3)
MCH RBC QN AUTO: 30.3 PG (ref 26.5–33)
MCHC RBC AUTO-ENTMCNC: 33.1 G/DL (ref 31.5–36.5)
MCV RBC AUTO: 91 FL (ref 78–100)
MONOCYTES # BLD AUTO: 0.5 10E9/L (ref 0–1.3)
MONOCYTES NFR BLD AUTO: 9.1 %
NEUTROPHILS # BLD AUTO: 4.5 10E9/L (ref 1.6–8.3)
NEUTROPHILS NFR BLD AUTO: 79.7 %
NRBC # BLD AUTO: 0 10*3/UL
NRBC BLD AUTO-RTO: 0 /100
PLATELET # BLD AUTO: 271 10E9/L (ref 150–450)
POTASSIUM SERPL-SCNC: 3.9 MMOL/L (ref 3.4–5.3)
PROT SERPL-MCNC: 7.4 G/DL (ref 6.8–8.8)
RBC # BLD AUTO: 3.37 10E12/L (ref 4.4–5.9)
SODIUM SERPL-SCNC: 142 MMOL/L (ref 133–144)
SPECIMEN SOURCE: NORMAL
WBC # BLD AUTO: 5.5 10E9/L (ref 4–11)

## 2020-10-14 PROCEDURE — 96360 HYDRATION IV INFUSION INIT: CPT

## 2020-10-14 PROCEDURE — U0003 INFECTIOUS AGENT DETECTION BY NUCLEIC ACID (DNA OR RNA); SEVERE ACUTE RESPIRATORY SYNDROME CORONAVIRUS 2 (SARS-COV-2) (CORONAVIRUS DISEASE [COVID-19]), AMPLIFIED PROBE TECHNIQUE, MAKING USE OF HIGH THROUGHPUT TECHNOLOGIES AS DESCRIBED BY CMS-2020-01-R: HCPCS | Performed by: EMERGENCY MEDICINE

## 2020-10-14 PROCEDURE — 80320 DRUG SCREEN QUANTALCOHOLS: CPT | Performed by: EMERGENCY MEDICINE

## 2020-10-14 PROCEDURE — 258N000003 HC RX IP 258 OP 636: Performed by: EMERGENCY MEDICINE

## 2020-10-14 PROCEDURE — 99223 1ST HOSP IP/OBS HIGH 75: CPT | Mod: AI | Performed by: INTERNAL MEDICINE

## 2020-10-14 PROCEDURE — 250N000009 HC RX 250: Performed by: INTERNAL MEDICINE

## 2020-10-14 PROCEDURE — 83690 ASSAY OF LIPASE: CPT | Performed by: EMERGENCY MEDICINE

## 2020-10-14 PROCEDURE — 83605 ASSAY OF LACTIC ACID: CPT | Performed by: EMERGENCY MEDICINE

## 2020-10-14 PROCEDURE — 258N000003 HC RX IP 258 OP 636: Performed by: INTERNAL MEDICINE

## 2020-10-14 PROCEDURE — 250N000013 HC RX MED GY IP 250 OP 250 PS 637: Performed by: EMERGENCY MEDICINE

## 2020-10-14 PROCEDURE — 250N000013 HC RX MED GY IP 250 OP 250 PS 637: Performed by: INTERNAL MEDICINE

## 2020-10-14 PROCEDURE — 96361 HYDRATE IV INFUSION ADD-ON: CPT

## 2020-10-14 PROCEDURE — 250N000011 HC RX IP 250 OP 636: Performed by: EMERGENCY MEDICINE

## 2020-10-14 PROCEDURE — 83735 ASSAY OF MAGNESIUM: CPT | Performed by: INTERNAL MEDICINE

## 2020-10-14 PROCEDURE — 80053 COMPREHEN METABOLIC PANEL: CPT | Performed by: EMERGENCY MEDICINE

## 2020-10-14 PROCEDURE — C9803 HOPD COVID-19 SPEC COLLECT: HCPCS

## 2020-10-14 PROCEDURE — 120N000001 HC R&B MED SURG/OB

## 2020-10-14 PROCEDURE — 250N000011 HC RX IP 250 OP 636: Performed by: INTERNAL MEDICINE

## 2020-10-14 PROCEDURE — 85025 COMPLETE CBC W/AUTO DIFF WBC: CPT | Performed by: EMERGENCY MEDICINE

## 2020-10-14 PROCEDURE — 99285 EMERGENCY DEPT VISIT HI MDM: CPT | Mod: 25

## 2020-10-14 PROCEDURE — 83735 ASSAY OF MAGNESIUM: CPT | Performed by: EMERGENCY MEDICINE

## 2020-10-14 PROCEDURE — 96372 THER/PROPH/DIAG INJ SC/IM: CPT | Performed by: EMERGENCY MEDICINE

## 2020-10-14 RX ORDER — MAGNESIUM OXIDE 400 MG/1
400 TABLET ORAL DAILY
Status: DISCONTINUED | OUTPATIENT
Start: 2020-10-15 | End: 2020-11-18 | Stop reason: HOSPADM

## 2020-10-14 RX ORDER — POTASSIUM CHLORIDE 1500 MG/1
20-40 TABLET, EXTENDED RELEASE ORAL
Status: DISCONTINUED | OUTPATIENT
Start: 2020-10-14 | End: 2020-10-28

## 2020-10-14 RX ORDER — METHOCARBAMOL 500 MG/1
500 TABLET, FILM COATED ORAL 3 TIMES DAILY PRN
Status: DISCONTINUED | OUTPATIENT
Start: 2020-10-14 | End: 2020-10-26

## 2020-10-14 RX ORDER — MULTIPLE VITAMINS W/ MINERALS TAB 9MG-400MCG
1 TAB ORAL DAILY
Status: DISCONTINUED | OUTPATIENT
Start: 2020-10-15 | End: 2020-11-18 | Stop reason: HOSPADM

## 2020-10-14 RX ORDER — OLANZAPINE 10 MG/2ML
10 INJECTION, POWDER, FOR SOLUTION INTRAMUSCULAR DAILY PRN
Status: DISCONTINUED | OUTPATIENT
Start: 2020-10-14 | End: 2020-10-14

## 2020-10-14 RX ORDER — VANCOMYCIN HYDROCHLORIDE 125 MG/1
125 CAPSULE ORAL 4 TIMES DAILY
Status: DISCONTINUED | OUTPATIENT
Start: 2020-10-14 | End: 2020-10-14

## 2020-10-14 RX ORDER — POLYETHYLENE GLYCOL 3350 17 G/17G
17 POWDER, FOR SOLUTION ORAL DAILY PRN
Status: DISCONTINUED | OUTPATIENT
Start: 2020-10-14 | End: 2020-11-18 | Stop reason: HOSPADM

## 2020-10-14 RX ORDER — PANTOPRAZOLE SODIUM 40 MG/1
40 TABLET, DELAYED RELEASE ORAL 2 TIMES DAILY
Status: DISCONTINUED | OUTPATIENT
Start: 2020-10-14 | End: 2020-11-18 | Stop reason: HOSPADM

## 2020-10-14 RX ORDER — NALOXONE HYDROCHLORIDE 0.4 MG/ML
.1-.4 INJECTION, SOLUTION INTRAMUSCULAR; INTRAVENOUS; SUBCUTANEOUS
Status: DISCONTINUED | OUTPATIENT
Start: 2020-10-14 | End: 2020-11-18 | Stop reason: HOSPADM

## 2020-10-14 RX ORDER — AMOXICILLIN 250 MG
1 CAPSULE ORAL 2 TIMES DAILY PRN
Status: DISCONTINUED | OUTPATIENT
Start: 2020-10-14 | End: 2020-11-18 | Stop reason: HOSPADM

## 2020-10-14 RX ORDER — ACETAMINOPHEN 325 MG/1
650 TABLET ORAL EVERY 4 HOURS PRN
Status: DISCONTINUED | OUTPATIENT
Start: 2020-10-14 | End: 2020-11-18 | Stop reason: HOSPADM

## 2020-10-14 RX ORDER — DEXTROSE MONOHYDRATE, SODIUM CHLORIDE, AND POTASSIUM CHLORIDE 50; 1.49; 9 G/1000ML; G/1000ML; G/1000ML
INJECTION, SOLUTION INTRAVENOUS CONTINUOUS
Status: DISCONTINUED | OUTPATIENT
Start: 2020-10-14 | End: 2020-10-17

## 2020-10-14 RX ORDER — QUETIAPINE FUMARATE 50 MG/1
100 TABLET, FILM COATED ORAL ONCE
Status: COMPLETED | OUTPATIENT
Start: 2020-10-14 | End: 2020-10-14

## 2020-10-14 RX ORDER — ONDANSETRON 2 MG/ML
4 INJECTION INTRAMUSCULAR; INTRAVENOUS EVERY 6 HOURS PRN
Status: DISCONTINUED | OUTPATIENT
Start: 2020-10-14 | End: 2020-11-18 | Stop reason: HOSPADM

## 2020-10-14 RX ORDER — TRAZODONE HYDROCHLORIDE 100 MG/1
100 TABLET ORAL
Status: DISCONTINUED | OUTPATIENT
Start: 2020-10-14 | End: 2020-11-02

## 2020-10-14 RX ORDER — ONDANSETRON 4 MG/1
4 TABLET, ORALLY DISINTEGRATING ORAL EVERY 6 HOURS PRN
Status: DISCONTINUED | OUTPATIENT
Start: 2020-10-14 | End: 2020-11-18 | Stop reason: HOSPADM

## 2020-10-14 RX ORDER — BISACODYL 10 MG
10 SUPPOSITORY, RECTAL RECTAL DAILY PRN
Status: DISCONTINUED | OUTPATIENT
Start: 2020-10-14 | End: 2020-11-18 | Stop reason: HOSPADM

## 2020-10-14 RX ORDER — ATENOLOL 25 MG/1
25 TABLET ORAL DAILY
Status: DISCONTINUED | OUTPATIENT
Start: 2020-10-15 | End: 2020-10-30

## 2020-10-14 RX ORDER — POTASSIUM CHLORIDE 29.8 MG/ML
20 INJECTION INTRAVENOUS
Status: DISCONTINUED | OUTPATIENT
Start: 2020-10-14 | End: 2020-10-28

## 2020-10-14 RX ORDER — POTASSIUM CHLORIDE 7.45 MG/ML
10 INJECTION INTRAVENOUS
Status: DISCONTINUED | OUTPATIENT
Start: 2020-10-14 | End: 2020-10-28

## 2020-10-14 RX ORDER — LORAZEPAM 1 MG/1
1-2 TABLET ORAL EVERY 30 MIN PRN
Status: DISCONTINUED | OUTPATIENT
Start: 2020-10-14 | End: 2020-10-18

## 2020-10-14 RX ORDER — FOLIC ACID 1 MG/1
1 TABLET ORAL DAILY
Status: DISCONTINUED | OUTPATIENT
Start: 2020-10-15 | End: 2020-11-18 | Stop reason: HOSPADM

## 2020-10-14 RX ORDER — LANOLIN ALCOHOL/MO/W.PET/CERES
100 CREAM (GRAM) TOPICAL DAILY
Status: DISCONTINUED | OUTPATIENT
Start: 2020-10-22 | End: 2020-10-21

## 2020-10-14 RX ORDER — MIRTAZAPINE 15 MG/1
30 TABLET, FILM COATED ORAL AT BEDTIME
Status: DISCONTINUED | OUTPATIENT
Start: 2020-10-14 | End: 2020-11-02

## 2020-10-14 RX ORDER — TAMSULOSIN HYDROCHLORIDE 0.4 MG/1
0.4 CAPSULE ORAL DAILY
Status: DISCONTINUED | OUTPATIENT
Start: 2020-10-15 | End: 2020-11-18 | Stop reason: HOSPADM

## 2020-10-14 RX ORDER — POTASSIUM CL/LIDO/0.9 % NACL 10MEQ/0.1L
10 INTRAVENOUS SOLUTION, PIGGYBACK (ML) INTRAVENOUS
Status: DISCONTINUED | OUTPATIENT
Start: 2020-10-14 | End: 2020-10-28

## 2020-10-14 RX ORDER — MAGNESIUM SULFATE HEPTAHYDRATE 40 MG/ML
4 INJECTION, SOLUTION INTRAVENOUS EVERY 4 HOURS PRN
Status: DISCONTINUED | OUTPATIENT
Start: 2020-10-14 | End: 2020-10-28

## 2020-10-14 RX ORDER — POTASSIUM CHLORIDE 1.5 G/1.58G
20-40 POWDER, FOR SOLUTION ORAL
Status: DISCONTINUED | OUTPATIENT
Start: 2020-10-14 | End: 2020-10-28

## 2020-10-14 RX ORDER — DIAZEPAM 5 MG
10 TABLET ORAL EVERY 30 MIN PRN
Status: DISCONTINUED | OUTPATIENT
Start: 2020-10-14 | End: 2020-10-14

## 2020-10-14 RX ORDER — AMOXICILLIN 250 MG
2 CAPSULE ORAL 2 TIMES DAILY PRN
Status: DISCONTINUED | OUTPATIENT
Start: 2020-10-14 | End: 2020-11-18 | Stop reason: HOSPADM

## 2020-10-14 RX ORDER — LORAZEPAM 2 MG/ML
1-2 INJECTION INTRAMUSCULAR EVERY 30 MIN PRN
Status: DISCONTINUED | OUTPATIENT
Start: 2020-10-14 | End: 2020-10-18

## 2020-10-14 RX ORDER — NICOTINE 21 MG/24HR
1 PATCH, TRANSDERMAL 24 HOURS TRANSDERMAL DAILY
Status: DISCONTINUED | OUTPATIENT
Start: 2020-10-15 | End: 2020-11-18 | Stop reason: HOSPADM

## 2020-10-14 RX ORDER — HYDROXYZINE HYDROCHLORIDE 25 MG/1
50-100 TABLET, FILM COATED ORAL 3 TIMES DAILY PRN
Status: DISCONTINUED | OUTPATIENT
Start: 2020-10-14 | End: 2020-10-21

## 2020-10-14 RX ORDER — LANOLIN ALCOHOL/MO/W.PET/CERES
100 CREAM (GRAM) TOPICAL DAILY
Status: DISCONTINUED | OUTPATIENT
Start: 2020-10-15 | End: 2020-10-14

## 2020-10-14 RX ORDER — POLYETHYLENE GLYCOL 3350 17 G
2 POWDER IN PACKET (EA) ORAL
Status: DISCONTINUED | OUTPATIENT
Start: 2020-10-14 | End: 2020-11-18 | Stop reason: HOSPADM

## 2020-10-14 RX ORDER — WATER 10 ML/10ML
INJECTION INTRAMUSCULAR; INTRAVENOUS; SUBCUTANEOUS
Status: DISCONTINUED
Start: 2020-10-14 | End: 2020-10-14 | Stop reason: HOSPADM

## 2020-10-14 RX ORDER — POTASSIUM CHLORIDE 1500 MG/1
20 TABLET, EXTENDED RELEASE ORAL 2 TIMES DAILY
Status: DISCONTINUED | OUTPATIENT
Start: 2020-10-14 | End: 2020-10-28

## 2020-10-14 RX ORDER — DIAZEPAM 10 MG/2ML
5-10 INJECTION, SOLUTION INTRAMUSCULAR; INTRAVENOUS EVERY 30 MIN PRN
Status: DISCONTINUED | OUTPATIENT
Start: 2020-10-14 | End: 2020-10-14

## 2020-10-14 RX ORDER — VANCOMYCIN HYDROCHLORIDE 125 MG/1
125 CAPSULE ORAL 4 TIMES DAILY
Status: DISCONTINUED | OUTPATIENT
Start: 2020-10-14 | End: 2020-10-28

## 2020-10-14 RX ORDER — QUETIAPINE FUMARATE 100 MG/1
100 TABLET, FILM COATED ORAL 2 TIMES DAILY PRN
Status: DISCONTINUED | OUTPATIENT
Start: 2020-10-14 | End: 2020-10-26

## 2020-10-14 RX ADMIN — LORAZEPAM 1 MG: 1 TABLET ORAL at 23:57

## 2020-10-14 RX ADMIN — SODIUM CHLORIDE 1000 ML: 9 INJECTION, SOLUTION INTRAVENOUS at 15:40

## 2020-10-14 RX ADMIN — VANCOMYCIN HYDROCHLORIDE 125 MG: 125 CAPSULE ORAL at 19:17

## 2020-10-14 RX ADMIN — PANTOPRAZOLE SODIUM 40 MG: 40 TABLET, DELAYED RELEASE ORAL at 22:56

## 2020-10-14 RX ADMIN — OLANZAPINE 10 MG: 10 INJECTION, POWDER, FOR SOLUTION INTRAMUSCULAR at 16:09

## 2020-10-14 RX ADMIN — MIRTAZAPINE 30 MG: 15 TABLET, FILM COATED ORAL at 22:56

## 2020-10-14 RX ADMIN — QUETIAPINE FUMARATE 100 MG: 50 TABLET ORAL at 20:30

## 2020-10-14 RX ADMIN — VANCOMYCIN HYDROCHLORIDE 125 MG: 125 CAPSULE ORAL at 22:56

## 2020-10-14 RX ADMIN — POTASSIUM CHLORIDE 20 MEQ: 1500 TABLET, EXTENDED RELEASE ORAL at 22:56

## 2020-10-14 RX ADMIN — SODIUM CHLORIDE 1000 ML: 9 INJECTION, SOLUTION INTRAVENOUS at 18:10

## 2020-10-14 RX ADMIN — LORAZEPAM 2 MG: 1 TABLET ORAL at 22:56

## 2020-10-14 RX ADMIN — FOLIC ACID: 5 INJECTION, SOLUTION INTRAMUSCULAR; INTRAVENOUS; SUBCUTANEOUS at 23:57

## 2020-10-14 ASSESSMENT — ENCOUNTER SYMPTOMS
COUGH: 0
ABDOMINAL PAIN: 0
SHORTNESS OF BREATH: 0
FEVER: 0
NAUSEA: 1
CHILLS: 0
DIARRHEA: 1

## 2020-10-14 NOTE — ED NOTES
"Patient used bedside urinal, and tv was turned on. Patient states \"I sincerely apologize for how I have acted to the staff\".   "

## 2020-10-14 NOTE — LETTER
Transition Communication Hand-off for Care Transitions to Next Level of Care Provider    Name: Jim Richard  : 1955  MRN #: [id number]  Primary Care Provider: FERNANDA BRANTLEY     Primary Clinic: [facility] [place] [facility] [address]     Reason for Hospitalization:  Lactic acidosis [E87.2]  C. difficile colitis [A04.72]  Alcoholic intoxication without complication (H) [F10.920]  Admit Date/Time: 10/14/2020  3:09 PM  Discharge Date: 20  Payor Source: Payor: MEDICARE / Plan: MEDICARE / Product Type: Medicare /     Readmission Assessment Measure (ISIS) Risk Score/category:    Plan of Care Goals/Milestone Events:   Patient Concerns:   Patient Goals:   Short-term   Long-term   Medical Goals   Short-te   Long-term          Reason for Communication Hand-off Referral:     Discharge Plan:  Discharge Plan:      Most Recent Value   Concerns Comments  -- [[facility] is recommending a commitment to [place] ]           Concern for non-adherence with plan of care:    Discharge Needs Assessment:  Needs      Most Recent Value   Equipment Currently Used at Home  none          Already enrolled in Tele-monitoring program and name of program:    Follow-up specialty is recommended:   Follow-up plan:  No future appointments.    Any outstanding tests or procedures:              Key Recommendations:  Patient has been committed as chemically dependent.  He will admit to [place] CD program in [facility] when they have a vacancy. In the meantime he will be at  [facility] in [facility]    Earline Mauricio [facility]    AVS/Discharge Summary is the source of truth; this is a helpful guide for improved communication of patient story

## 2020-10-14 NOTE — ED NOTES
[facility]  ED Arrival Note      Means of Arrival: EMS    Story:        Current behavior: Agitated      Physical Appearance: Untidy      Safety Concerns: Suicidal, with a plan to    ETOH intoxication    Legal Hold Status: [facility]    Constant Monitoring, Q15: Yes    Patient therapeutically Searched: Yes    Patient changed into scrubs: Yes    Belongings: Sealed and put in locker per unit protocol    Video Observation initiated and patient informed: Yes

## 2020-10-14 NOTE — ED PROVIDER NOTES
History     Chief Complaint:  Suicidal Ideation     The history is provided by the EMS personnel and the patient.      Jim Richard is a 65 year old male with a history of alcoholism, alcoholic cirrhosis, depression, hypertension, COPD and CAD who presents via EMS for evaluation of suicidal ideation and intoxication. EMS reports that the patient has been seen by them multiple times over the last 3-4 months for the same. They state that the patient today stated upon their arrival that he would take all of his medications at once while holding a bag full of medications in an attempt to harm himself, prompting EMS to bring the patient here.  There is also history that he called his brother and spoke with him about obtaining a gun to harm himself.  Patient is clinically intoxicated at this time and does not provide a good history. The patient is currently positive for C. diff.  Patient reports that he has not been consistently taking his medications including his vancomycin.  He denies any significant abdominal pain but notes he is having some ongoing diarrhea but has not had any blood in his stool.  He notes some nausea and decreased appetite since discharging from the hospital.  He denies any fevers or chills.    Allergies:  Amlodipine  Lisinopril     Medications:    Atenolol  Breo Ellipta inhaler  Lasix  Hydroxyzine HCl  Remeron  Protonix  Seroquel  Aldactone  Flomax  Trazodone  Trintellix  Folic acid  Klor-con  methocarbamol  Magnesium oxide    Past Medical History:    Alcoholism  Anxiety  CAD  COPD  Depression  Esophageal varices with bleeding  Heart attack  Hypertension  Hyperlipidemia  Subdural hematoma  Alcoholic cirrhosis  Spinal stenosis  JANIS  Peripheral vascular disease    Past Surgical History:    Appendectomy  Colonoscopy x3  Bypass graft femoropopliteal   Endarterectomy femoral  EGD combined x6  Hernia repair  Laminectomy, fusion lumbar three levels combined  Tonsillectomy and  adenoidectomy    Family History:    CAD  Alcoholism  Lung cancer    Social History:  The patient presents to the ED alone via EMS.  Smoking Status: Current Every Day Smoker, 1PPD  Smokeless Tobacco: Never Used  Alcohol Use: Yes, vodka  Drug Use: No  PCP: Talon Elias A     Review of Systems   Unable to perform ROS: Mental status change   Constitutional: Negative for chills and fever.   Respiratory: Negative for cough and shortness of breath.    Cardiovascular: Negative for chest pain.   Gastrointestinal: Positive for diarrhea and nausea. Negative for abdominal pain.   Psychiatric/Behavioral: Positive for suicidal ideas.     Physical Exam     Patient Vitals for the past 24 hrs:   BP Temp Pulse Resp SpO2   10/14/20 1900 (!) 146/66 -- 70 -- 96 %   10/14/20 1857 -- -- -- -- 98 %   10/14/20 1856 -- -- -- -- 97 %   10/14/20 1855 -- -- -- -- 95 %   10/14/20 1854 -- -- -- -- 95 %   10/14/20 1853 -- -- -- -- 97 %   10/14/20 1852 -- -- -- -- 98 %   10/14/20 1814 -- -- -- -- 94 %   10/14/20 1800 (!) 141/62 -- 72 -- --   10/14/20 1730 135/63 -- 71 -- 96 %   10/14/20 1600 128/73 -- 68 -- --   10/14/20 1530 132/73 -- 67 -- --   10/14/20 1510 -- -- -- -- 98 %   10/14/20 1509 (!) 140/67 97.3  F (36.3  C) 69 18 98 %       Physical Exam  General: Patient is awake, alert and interactive when I enter the room.  Appears clinically intoxicated.  Head: The scalp, face, and head appear normal  Eyes: The pupils are equal, round, and reactive to light. Conjunctivae and sclerae are normal  Neck: Normal range of motion.   CV: Regular rate.   Resp: Lungs are clear without wheezes or rales. No respiratory distress.   GI: Abdomen is soft, no rigidity, guarding, or rebound. No distension. No tenderness to palpation in any quadrant.     MS: Normal tone. Joints grossly normal without effusions. No asymmetric leg swelling, calf or thigh tenderness.    Skin: No rash or lesions noted. Normal capillary refill noted  Neuro: Speech is slurred.  face is symmetric. Moving all extremities.   Psych: labile    Emergency Department Course     Laboratory:  Laboratory findings were communicated with the patient and admitting MD who voiced understanding of the findings.    CBC: WBC: 5.5, HGB: 10.2 (low), PLT: 271    CMP: Chloride 110 (high), Creatinine 1.63 (high), GFR 43 (low), Calcium 8.2 (low), Alkaline Phosphatase 177 (high) o/w WNL     1727 Lactic Acid Whole Blood: 3.9 (high)     Lipase: 236    Magnesium: 2.4 (high)     Alcohol ethyl: 0.36 (critically high)     Asymptomatic COVID-19 Virus (Coronavirus) PCR: Pending     Interventions:  1540 NS 1L IV  1609 Zyprexa 10mg IM  1810 NS 1L IV  1917 Vancomycin 125mg PO  2030 Seroquel 100mg PO    Emergency Department Course:  Past medical records, nursing notes, and vitals reviewed.    1530 I performed an exam of the patient as documented above.     IV was inserted and blood was drawn for laboratory testing, results above.    1859 I consulted with Dr. Gordon, hospitalist, regarding the patient's history and presentation here in the emergency department who accepted the patient for admission.     1905 I rechecked the patient.    Findings and plan explained to the patient who consents to admission. Discussed the patient with Dr. Gordon, who will admit the patient to a medical bed for further monitoring, evaluation, and treatment.    I personally reviewed the laboratory results with the patient and answered all related questions prior to admission.     Impression & Plan     Covid-19  Jim Richard was evaluated during a global COVID-19 pandemic, which necessitated consideration that the patient might be at risk for infection with the SARS-CoV-2 virus that causes COVID-19.   Applicable protocols for evaluation were followed during the patient's care.   COVID-19 was considered as part of the patient's evaluation. The plan for testing is:  a test was obtained during this visit.    Medical Decision Making:  Patient is a  65-year-old gentleman with recent hospitalization for diarrhea that resulted in a positive C. difficile culture who presents emergency department today with acute alcohol intoxication, suicidal ideation and ongoing diarrhea.  Patient has had numerous hospitalizations for similar presentation.  Patient does appear to have significant and threatening plan today.  He will require reaching clinical sobriety before he is evaluated for his suicidality.  However given his ongoing C. difficile infection and diarrhea I did perform blood work.  This revealed a lactic acidosis without significant electrolyte abnormalities.  Patient was given a dose of his vancomycin here in the emergency department and was fluid resuscitated.  Given these findings he will require admission for medical stabilization prior to psychiatric evaluation and possible admission.    Diagnosis:    ICD-10-CM    1. Alcoholic intoxication without complication (H)  F10.920    2. C. difficile colitis  A04.72    3. Lactic acidosis  E87.2        Disposition:  Admitted to Dr. Gordon.      Scribe Disclosure:  I, Kennedy Garces, am serving as a scribe at 3:33 PM on 10/14/2020 to document services personally performed by Mauricio Antoine MD based on my observations and the provider's statements to me.      Mauricio Antoine MD  10/14/20 6060

## 2020-10-14 NOTE — TELEPHONE ENCOUNTER
"Very difficult to understand most of what patient was saying.    \"Everyone on the floor knows me\"    Voice coming in and out. Poor cellular connection?    Call ended by patient.    Halina Ren RN  [facility]        Additional Information    Caller hangs up    Unable to complete triage due to phone connection issues    Protocols used: NO CONTACT OR DUPLICATE CONTACT CALL-A-AH      "

## 2020-10-14 NOTE — ED TRIAGE NOTES
Pt BIBA from home. Pt had texted his brother stating he wanted his brother to get him a gun because he wanted to kill himself. Pt had been drinking ETOH. Pt then told EMS he would take all of his medications to kill himself. Pt yelling upon arrival. Per EMS report pt has not been taking medications at home. Pt was defiant and aggressive toward EMS.

## 2020-10-14 NOTE — ED NOTES
"Pt andrea, this nurse went to see what pt wanted.  \"I dont know you are a bunch of incompetent people.\"  Asked pt what he needed  Pt stated \"Maybe I need a blow job\"  Pt continues to holler and be verbally abusive to staff  "

## 2020-10-14 NOTE — ED NOTES
Bed: ED18  Expected date:   Expected time:   Means of arrival:   Comments:  [facility] [place] [facility] - 59 M suicidal ETOH negative covid eta 1459

## 2020-10-15 LAB
ALBUMIN SERPL-MCNC: 2.9 G/DL (ref 3.4–5)
ALP SERPL-CCNC: 165 U/L (ref 40–150)
ALT SERPL W P-5'-P-CCNC: 18 U/L (ref 0–70)
ANION GAP SERPL CALCULATED.3IONS-SCNC: 6 MMOL/L (ref 3–14)
ANISOCYTOSIS BLD QL SMEAR: SLIGHT
AST SERPL W P-5'-P-CCNC: 33 U/L (ref 0–45)
BASOPHILS # BLD AUTO: 0 10E9/L (ref 0–0.2)
BASOPHILS NFR BLD AUTO: 0.9 %
BILIRUB SERPL-MCNC: 0.8 MG/DL (ref 0.2–1.3)
BUN SERPL-MCNC: 24 MG/DL (ref 7–30)
CALCIUM SERPL-MCNC: 8.2 MG/DL (ref 8.5–10.1)
CHLORIDE SERPL-SCNC: 109 MMOL/L (ref 94–109)
CO2 SERPL-SCNC: 23 MMOL/L (ref 20–32)
CREAT SERPL-MCNC: 1.71 MG/DL (ref 0.66–1.25)
DIFFERENTIAL METHOD BLD: ABNORMAL
EOSINOPHIL # BLD AUTO: 0 10E9/L (ref 0–0.7)
EOSINOPHIL NFR BLD AUTO: 0.9 %
ERYTHROCYTE [DISTWIDTH] IN BLOOD BY AUTOMATED COUNT: 16.5 % (ref 10–15)
GFR SERPL CREATININE-BSD FRML MDRD: 41 ML/MIN/{1.73_M2}
GLUCOSE SERPL-MCNC: 114 MG/DL (ref 70–99)
HCT VFR BLD AUTO: 29.7 % (ref 40–53)
HGB BLD-MCNC: 9.5 G/DL (ref 13.3–17.7)
IMM GRANULOCYTES # BLD: 0 10E9/L (ref 0–0.4)
IMM GRANULOCYTES NFR BLD: 0.3 %
LYMPHOCYTES # BLD AUTO: 0.6 10E9/L (ref 0.8–5.3)
LYMPHOCYTES NFR BLD AUTO: 16.4 %
MCH RBC QN AUTO: 29.2 PG (ref 26.5–33)
MCHC RBC AUTO-ENTMCNC: 32 G/DL (ref 31.5–36.5)
MCV RBC AUTO: 91 FL (ref 78–100)
MICROCYTES BLD QL SMEAR: PRESENT
MONOCYTES # BLD AUTO: 0.7 10E9/L (ref 0–1.3)
MONOCYTES NFR BLD AUTO: 21.4 %
NEUTROPHILS # BLD AUTO: 2 10E9/L (ref 1.6–8.3)
NEUTROPHILS NFR BLD AUTO: 60.1 %
NRBC # BLD AUTO: 0 10*3/UL
NRBC BLD AUTO-RTO: 0 /100
PHOSPHATE SERPL-MCNC: 2.5 MG/DL (ref 2.5–4.5)
PLATELET # BLD AUTO: 191 10E9/L (ref 150–450)
PLATELET # BLD EST: ABNORMAL 10*3/UL
POTASSIUM SERPL-SCNC: 4.1 MMOL/L (ref 3.4–5.3)
PROT SERPL-MCNC: 6.9 G/DL (ref 6.8–8.8)
RBC # BLD AUTO: 3.25 10E12/L (ref 4.4–5.9)
RBC MORPH BLD: ABNORMAL
SARS-COV-2 RNA SPEC QL NAA+PROBE: NOT DETECTED
SODIUM SERPL-SCNC: 138 MMOL/L (ref 133–144)
SPECIMEN SOURCE: NORMAL
WBC # BLD AUTO: 3.4 10E9/L (ref 4–11)

## 2020-10-15 PROCEDURE — 99207 PR CDG-CODE CATEGORY CHANGED: CPT | Performed by: PSYCHIATRY & NEUROLOGY

## 2020-10-15 PROCEDURE — 84100 ASSAY OF PHOSPHORUS: CPT | Performed by: INTERNAL MEDICINE

## 2020-10-15 PROCEDURE — 36415 COLL VENOUS BLD VENIPUNCTURE: CPT | Performed by: INTERNAL MEDICINE

## 2020-10-15 PROCEDURE — 258N000001 HC RX 258: Performed by: INTERNAL MEDICINE

## 2020-10-15 PROCEDURE — 99233 SBSQ HOSP IP/OBS HIGH 50: CPT | Performed by: HOSPITALIST

## 2020-10-15 PROCEDURE — 99221 1ST HOSP IP/OBS SF/LOW 40: CPT | Performed by: PSYCHIATRY & NEUROLOGY

## 2020-10-15 PROCEDURE — 80053 COMPREHEN METABOLIC PANEL: CPT | Performed by: INTERNAL MEDICINE

## 2020-10-15 PROCEDURE — 250N000011 HC RX IP 250 OP 636: Performed by: INTERNAL MEDICINE

## 2020-10-15 PROCEDURE — 250N000013 HC RX MED GY IP 250 OP 250 PS 637: Performed by: INTERNAL MEDICINE

## 2020-10-15 PROCEDURE — 85025 COMPLETE CBC W/AUTO DIFF WBC: CPT | Performed by: INTERNAL MEDICINE

## 2020-10-15 PROCEDURE — 258N000003 HC RX IP 258 OP 636: Performed by: INTERNAL MEDICINE

## 2020-10-15 PROCEDURE — 120N000001 HC R&B MED SURG/OB

## 2020-10-15 RX ADMIN — VANCOMYCIN HYDROCHLORIDE 125 MG: 125 CAPSULE ORAL at 17:32

## 2020-10-15 RX ADMIN — HYDROXYZINE HYDROCHLORIDE 50 MG: 25 TABLET, FILM COATED ORAL at 17:31

## 2020-10-15 RX ADMIN — TRAZODONE HYDROCHLORIDE 100 MG: 100 TABLET ORAL at 00:03

## 2020-10-15 RX ADMIN — THIAMINE HYDROCHLORIDE 500 MG: 100 INJECTION, SOLUTION INTRAMUSCULAR; INTRAVENOUS at 21:40

## 2020-10-15 RX ADMIN — ACETAMINOPHEN 650 MG: 325 TABLET, FILM COATED ORAL at 21:39

## 2020-10-15 RX ADMIN — VANCOMYCIN HYDROCHLORIDE 125 MG: 125 CAPSULE ORAL at 21:39

## 2020-10-15 RX ADMIN — LORAZEPAM 1 MG: 1 TABLET ORAL at 17:32

## 2020-10-15 RX ADMIN — QUETIAPINE FUMARATE 100 MG: 100 TABLET ORAL at 14:28

## 2020-10-15 RX ADMIN — POTASSIUM CHLORIDE 20 MEQ: 1500 TABLET, EXTENDED RELEASE ORAL at 20:29

## 2020-10-15 RX ADMIN — THIAMINE HYDROCHLORIDE 500 MG: 100 INJECTION, SOLUTION INTRAMUSCULAR; INTRAVENOUS at 10:13

## 2020-10-15 RX ADMIN — NICOTINE 1 PATCH: 21 PATCH, EXTENDED RELEASE TRANSDERMAL at 10:07

## 2020-10-15 RX ADMIN — POTASSIUM CHLORIDE, DEXTROSE MONOHYDRATE AND SODIUM CHLORIDE: 150; 5; 900 INJECTION, SOLUTION INTRAVENOUS at 21:40

## 2020-10-15 RX ADMIN — VORTIOXETINE 10 MG: 10 TABLET, FILM COATED ORAL at 10:02

## 2020-10-15 RX ADMIN — TRAZODONE HYDROCHLORIDE 100 MG: 100 TABLET ORAL at 21:39

## 2020-10-15 RX ADMIN — ATENOLOL 25 MG: 25 TABLET ORAL at 10:03

## 2020-10-15 RX ADMIN — METHOCARBAMOL 500 MG: 500 TABLET ORAL at 17:45

## 2020-10-15 RX ADMIN — TAMSULOSIN HYDROCHLORIDE 0.4 MG: 0.4 CAPSULE ORAL at 10:02

## 2020-10-15 RX ADMIN — THIAMINE HYDROCHLORIDE 500 MG: 100 INJECTION, SOLUTION INTRAMUSCULAR; INTRAVENOUS at 16:35

## 2020-10-15 RX ADMIN — VANCOMYCIN HYDROCHLORIDE 125 MG: 125 CAPSULE ORAL at 12:58

## 2020-10-15 RX ADMIN — POTASSIUM CHLORIDE, DEXTROSE MONOHYDRATE AND SODIUM CHLORIDE: 150; 5; 900 INJECTION, SOLUTION INTRAVENOUS at 10:13

## 2020-10-15 RX ADMIN — LORAZEPAM 1 MG: 1 TABLET ORAL at 14:28

## 2020-10-15 RX ADMIN — MULTIPLE VITAMINS W/ MINERALS TAB 1 TABLET: TAB at 10:02

## 2020-10-15 RX ADMIN — ACETAMINOPHEN 650 MG: 325 TABLET, FILM COATED ORAL at 14:28

## 2020-10-15 RX ADMIN — QUETIAPINE FUMARATE 100 MG: 100 TABLET ORAL at 00:03

## 2020-10-15 RX ADMIN — POTASSIUM CHLORIDE 20 MEQ: 1500 TABLET, EXTENDED RELEASE ORAL at 10:03

## 2020-10-15 RX ADMIN — LORAZEPAM 1 MG: 1 TABLET ORAL at 10:38

## 2020-10-15 RX ADMIN — ONDANSETRON 4 MG: 4 TABLET, ORALLY DISINTEGRATING ORAL at 01:12

## 2020-10-15 RX ADMIN — THIAMINE HYDROCHLORIDE 500 MG: 100 INJECTION, SOLUTION INTRAMUSCULAR; INTRAVENOUS at 00:08

## 2020-10-15 RX ADMIN — VANCOMYCIN HYDROCHLORIDE 125 MG: 125 CAPSULE ORAL at 10:02

## 2020-10-15 RX ADMIN — PANTOPRAZOLE SODIUM 40 MG: 40 TABLET, DELAYED RELEASE ORAL at 20:29

## 2020-10-15 RX ADMIN — PANTOPRAZOLE SODIUM 40 MG: 40 TABLET, DELAYED RELEASE ORAL at 10:02

## 2020-10-15 RX ADMIN — MAGNESIUM OXIDE 400 MG: 400 TABLET ORAL at 10:02

## 2020-10-15 RX ADMIN — FOLIC ACID 1 MG: 1 TABLET ORAL at 10:03

## 2020-10-15 RX ADMIN — MIRTAZAPINE 30 MG: 15 TABLET, FILM COATED ORAL at 21:39

## 2020-10-15 ASSESSMENT — ACTIVITIES OF DAILY LIVING (ADL)
ADLS_ACUITY_SCORE: 13
ADLS_ACUITY_SCORE: 14
ADLS_ACUITY_SCORE: 13
ADLS_ACUITY_SCORE: 10
ADLS_ACUITY_SCORE: 10
ADLS_ACUITY_SCORE: 14

## 2020-10-15 NOTE — PROGRESS NOTES
RECEIVING UNIT ED HANDOFF REVIEW    ED Nurse Handoff Report was reviewed by: Rowan Louie RN on October 14, 2020 at 9:33 PM

## 2020-10-15 NOTE — ED NOTES
[facility]  ED Nurse Handoff Report    ED Chief complaint: Alcohol Intoxication and Suicidal      ED Diagnosis:   Final diagnoses:   Alcoholic intoxication without complication (H)   C. difficile colitis   Lactic acidosis       Code Status: see admitting MD    Allergies:   Allergies   Allergen Reactions     Amlodipine Swelling     Lisinopril      Other reaction(s): Angioedema  Mouth and tongue swelling   Mouth and tongue swelling          Patient Story: 65 year old male presents to the ED because he wants to kill himself by overdosing on ETOH.   Focused Assessment:  Pt continues to become sad and weepy stating over and over that he wants to die.     Treatments and/or interventions provided: 2 L NS bolus, vanco 125 mg tab, seroquel 100 mg tab, zyprexa 10 mg, labs and ADRIANA.  Patient's response to treatments and/or interventions: good    To be done/followed up on inpatient unit:  pt just given seroquel 100 mg a few at 2047 and a repeat lactic. Pt was given a meal just prior to that.    Does this patient have any cognitive concerns?: none    Activity level - Baseline/Home:  Independent  Activity Level - Current:   Independent    Patient's Preferred language: English   Needed?: No    Isolation: None and Enteric  Infection: Not Applicable  Bariatric?: No    Vital Signs:   Vitals:    10/14/20 1855 10/14/20 1856 10/14/20 1857 10/14/20 1900   BP:    (!) 146/66   Pulse:    70   Resp:       Temp:       SpO2: 95% 97% 98% 96%       Cardiac Rhythm:     Was the PSS-3 completed:   Yes  What interventions are required if any?               Family Comments: none  OBS brochure/video discussed/provided to patient/family: No              Name of person given brochure if not patient: na              Relationship to patient: na    For the majority of the shift this patient's behavior was Green.   Behavioral interventions performed were none.    ED NURSE PHONE NUMBER: [id number]

## 2020-10-15 NOTE — CONSULTS
Care Management Initial Consult    General Information  Assessment completed with:: Care Team MemberELVIE-chart review,    Type of CM/SW Visit: Initial Assessment     Readmission Within the Last 30 Days: lack of support  Return Category: Exacerbation of disease     Advance Care Planning: Advance Care Planning Reviewed: present on chart          Communication Assessment  Patient's communication style: spoken language (English or Bilingual)  Hearing Difficulty or Deaf: no Wear Glasses or Blind: no    Cognitive  Cognitive/Neuro/Behavioral: .WDL except  Level of Consciousness: alert  Arousal Level: opens eyes spontaneously  Orientation: appears oriented x 4  Mood/Behavior: calm, cooperative, withdrawn, anxious  Best Language: 0 - No aphasia  Speech: clear, spontaneous, logical    Living Environment:   People in home:       Current living Arrangements: house          Family/Social Support:  Care provided by: self  Provides care for: no one  Marital Status: Single  Who is your support system?: Children, Sibling(s)     Description of Support System: Involved  Description of Support System: Involved           Description of Support System: Involved       Current Resources:   Skilled Home Care Services:  none  Community Resources:  none  Equipment currently used at home: none      Employment:  Employment Status: unemployed        Financial/Environmental Concerns:     Not addressed       Lifestyle & Psychosocial Needs:        Socioeconomic History     Marital status:      Spouse name: Not on file     Number of children: 2     Years of education: Not on file     Highest education level: Not on file   Occupational History     Occupation: Printer, on disability     Tobacco Use     Smoking status: Current Every Day Smoker     Packs/day: 1.00     Types: Cigarettes     Smokeless tobacco: Never Used     Tobacco comment: Chantix caused nightmares   Substance and Sexual Activity     Alcohol use: Yes     Comment: hx etoh, drinks  vodka      Drug use: No     Sexual activity: Not Currently       Functional Status:  Prior to admission patient needed assistance: n/a    Mental Health Status:  WDL            Values/Beliefs:  Spiritual, Cultural Beliefs, Restorationism Practices, Values that affect care:   Not addressed              Additional Information: Spoke with Dr Brito who filed out the examiners statement in support of MI/CD commitment. Pt previously under commitment with [facility] and just ended on 9/25/20. SW called [facility]. Dez, [phone], will call pt on Friday. Faxed records except psych note that is not in yet. Exhibit A completed and left in Administration for signature.     JULIA John, LGSW  [phone]  [facility]

## 2020-10-15 NOTE — PROGRESS NOTES
Current condition (current mood & behavior): calm, cooperative, anxious  Sitter present: yes  Every 15 minute documentation by NA/RN completed for Shift: yes  Room safety Suicide Checklist completed in Epic: yes  Patient's color of severity (suicide scale): YELLOW  Order for psych consult placed (if appropriate): yes  Suicide care plan added: yes      Rowan Louie RN

## 2020-10-15 NOTE — PHARMACY-ADMISSION MEDICATION HISTORY
Pharmacy Medication History  Admission medication history interview status for the 10/14/2020  admission is complete. See EPIC admission navigator for prior to admission medications       Medication history sources: Patient  Location of interview: phone  Medication history source reliability: Moderate  Adherence assessment: Moderate    Significant changes made to the medication list:  Patient states he can't remember the last time he took oral Vanco.        Medication reconciliation completed by provider prior to medication history? Yes    Time spent in this activity: 15      Prior to Admission medications    Medication Sig Last Dose Taking? Auth Provider   atenolol (TENORMIN) 25 MG tablet Take 25 mg by mouth daily 10/14/2020 at Unknown time Yes Unknown, Entered By History   fluticasone-vilanterol (BREO ELLIPTA) 200-25 MCG/INH inhaler Inhale 1 puff into the lungs daily as needed Patient stated that he takes as needed when smoking Past Week at Unknown time Yes Unknown, Entered By History   folic acid (FOLVITE) 1 MG tablet Take 1 mg by mouth daily 10/14/2020 at Unknown time Yes Unknown, Entered By History   furosemide (LASIX) 20 MG tablet Take 10 mg by mouth daily 10/14/2020 at Unknown time Yes Unknown, Entered By History   hydrOXYzine (ATARAX) 50 MG tablet Take  mg by mouth 3 times daily as needed for itching  Yes Unknown, Entered By History   magnesium oxide (MAG-OX) 400 MG tablet Take 400 mg by mouth daily 10/14/2020 at Unknown time Yes Unknown, Entered By History   methocarbamol (ROBAXIN) 500 MG tablet Take 500 mg by mouth 3 times daily as needed for muscle spasms 10/14/2020 at Unknown time Yes Unknown, Entered By History   mirtazapine (REMERON) 30 MG tablet Take 30 mg by mouth At Bedtime 10/14/2020 at Unknown time Yes Unknown, Entered By History   multivitamin w/minerals (THERA-VIT-M) tablet Take 1 tablet by mouth daily 10/14/2020 at Unknown time Yes Jude Ledesma MD   pantoprazole (PROTONIX) 40 MG  EC tablet Take 40 mg by mouth 2 times daily  10/14/2020 at Unknown time Yes Unknown, Entered By History   potassium chloride ER (KLOR-CON M) 10 MEQ CR tablet Take 20 mEq by mouth 2 times daily 10/14/2020 at Unknown time Yes Unknown, Entered By History   QUEtiapine (SEROQUEL) 100 MG tablet Take 100 mg by mouth 2 times daily as needed (moderate agitation)  10/14/2020 at Unknown time Yes Unknown, Entered By History   spironolactone (ALDACTONE) 25 MG tablet Take 1 tablet (25 mg) by mouth daily 10/14/2020 at Unknown time Yes Jeffrey Villagomez MD   tamsulosin (FLOMAX) 0.4 MG capsule Take 0.4 mg by mouth daily 10/14/2020 at Unknown time Yes Unknown, Entered By History   traZODone (DESYREL) 100 MG tablet Take 100 mg by mouth nightly as needed for sleep 10/14/2020 at Unknown time Yes Unknown, Entered By History   vancomycin (VANCOCIN) 125 MG capsule Take 1 capsule (125 mg) by mouth 4 times daily Past Week at Unknown time Yes Jeffrey Villagomez MD   vortioxetine (TRINTELLIX) 10 MG tablet Take 1 tablet (10 mg) by mouth daily 10/14/2020 at Unknown time Yes Jude Ledesma MD

## 2020-10-15 NOTE — H&P
[facility]    History and Physical - Hospitalist Service       Date of Admission:  10/14/2020    Assessment & Plan   Jim Richard is a 65 year old male with history of alcohol use disorder, cirrhosis, depression, recent hospitalization for intentional overdose, multiple hospitalization for intoxication who is being admitted again on 10/14/2020, due to continued drinking and suicidal ideation, stating that he would take all of his medications at once.  Recently hospitalized from 10/6 - 10/11 for alcohol intoxication with metabolic acidosis and C. difficile.  After discharge he states he stopped taking all of his medications and has been drinking because he does not care whether he lives or dies.    Depression, anxiety, suicidal ideation   Acute alcohol intoxication  Severe alcohol use disorder - multiple recent admission for intoxication at risk of life-threateningillness/death  Patient has been through treatment multiple times in the past. He states he stating drinking again right after discharge. BAL 0.36 at admission.    - CIWA with ativan ordered.  - Psychiatry consulted. We may need to pursue commitment.    -Continue PTA mirtazapine, trintellix and  Trazodone, Atarax as needed as well.     Alcoholic cirrhosis with history of esophageal varices and ascites - History of GI bleed.  Grade 1 esophageal varices with portal gastropathy. He c/o diffuse abdominal discomfort and has obvious fluid wave on exam with diffuse mild tenderness on exam. Last paracentesis on 10/9 was negative for SBP and patient currently refusing paracentesis stating pain no different now than prior to that procedure.   - Recommended therapeutic and diagnostic paracentesis and would order tomorrow if patient agreeable.   - Holding lasix and spironolactone in context of lactic acidosis. Resume tomorrow if remains hemodynamically stable  - continue PPI   - continue PTA potassium BID and magnesium   - high dose  thiamine and multi-vitamins, folic acid ordered.   - continue PTA atenolol     Lactic acidosis secondary to above.  Hemodynamically stable with low suspicion for infection / sepsis  -Status post IV fluids in the ED and will continue maintenance fluid on the floor.  -Follow-up lactic acid level is ordered.    C. difficile colitis, diagnosed during his last admission CT A/P with circumferential wall thickening of rectum suggesting distal colitis/proctitis. procalcitonin low at 0.07. WBC 7.7. Afebrile.  Not taking vancomycin and currently having 2-3 loose stools a day.  - oral vancomycin 125 mg every 6 hours with plan to continue to complete 10 more days as ordered at discharge.    Chronic kidney disease stage III  Cr 1.63, Baseline creatinine 1.4-1.9.      Chronic anemia, nl MCV, stable  - hgb 10.2      BPH  -Continue flomax     JANIS,   - home CPAP ordered       Diet:   Low salt diet  DVT Prophylaxis: Pneumatic Compression Devices  Leger Catheter: not present  Code Status:   deferred given suicidal ideation.          Disposition Plan   Expected discharge: 2 - 3 days, recommended to TBD     Entered: Cindy Gordon MD 10/14/2020, 8:48 PM     The patient's care was discussed with the Patient.    Cindy Gordon MD  [facility]  Contact information available via [facility] Paging/Directory      ______________________________________________________________________    Chief Complaint   Suicidal ideation     History is obtained from the patient    History of Present Illness   Jim Richard is a 65 year old male with history of alcohol use disorder, cirrhosis, depression, recent hospitalization for intentional overdose, multiple hospitalization for intoxication who is being admitted again on 10/14/2020, due to continued drinking and suicidal ideation, stating that he would take all of his medications at once.  He is not forthcoming in his history.  He refuses to answer many of my questions.  He  states he started drinking right after he discharged he is he does not care if he lives or dies.  He admits that he is having suicidal ideation in the knees want he wants to take all of his medications at once.  He denies any acute change in his and his symptoms since his discharge from the hospital.  He has not taking his medication for C. difficile and has had about 2-3 loose bowel movements a day.       Review of Systems    The 10 point Review of Systems is negative other than noted in the HPI.     Past Medical History    I have reviewed this patient's medical history and updated it with pertinent information if needed.   Past Medical History:   Diagnosis Date     Alcoholism (H) 1/14/2013    had treatment at [facility]     Anxiety      Coronary artery disease 09/09/2014    Nuclear stress test with slight fixed defect inferiorly, no ischemia     Depression     w/anxiety     Esophageal varices with bleeding 04/28/2018    6 varices banded on EGD     Heart attack (H)      Hepatomegaly     Fatty liver, chronic alcoholic     Hyperlipemia      Hypertension      JANIS on CPAP      Peripheral vascular disease (H)      PVD (peripheral vascular disease) (H)      SDH (subdural hematoma) (H) 04/26/2018    Right side, resolved spontaneously     Spinal stenosis      Substance abuse (H)        Past Surgical History   I have reviewed this patient's surgical history and updated it with pertinent information if needed.  Past Surgical History:   Procedure Laterality Date     APPENDECTOMY       BYPASS GRAFT FEMOROPOPLITEAL  6/12/2012    Procedure: BYPASS GRAFT FEMOROPOPLITEAL;  LEFT FEMORAL TO ABOVE KNEE POPLITEAL BYPASS, ENDARTERECTOMY OF SFA AND PF ARTERIES, LEFT EXTERNAL ILIAC TO COMMON FEMORAL INTERPOSITION GRAFT;  Surgeon: Damir Roberts MD;  Location:  OR     COLONOSCOPY       COLONOSCOPY N/A 8/8/2019    Procedure: COLONOSCOPY;  Surgeon: Pavan Arguello MD;  Location:  GI     COLONOSCOPY N/A 3/10/2020    Procedure:  COLONOSCOPY;  Surgeon: Rosendo Shaw MD;  Location:  GI     ENDARTERECTOMY FEMORAL  6/12/2012    Procedure: ENDARTERECTOMY FEMORAL;;  Surgeon: Damir Roberts MD;  Location:  OR     ESOPHAGOSCOPY, GASTROSCOPY, DUODENOSCOPY (EGD), COMBINED N/A 3/22/2018    Procedure: COMBINED ESOPHAGOSCOPY, GASTROSCOPY, DUODENOSCOPY (EGD);  ESOPHAGOSCOPY, GASTROSCOPY, DUODENOSOCPY.;  Surgeon: Valeri Pruitt MD;  Location:  OR     ESOPHAGOSCOPY, GASTROSCOPY, DUODENOSCOPY (EGD), COMBINED N/A 9/29/2018    Procedure: COMBINED ESOPHAGOSCOPY, GASTROSCOPY, DUODENOSCOPY (EGD);;  Surgeon: Rosendo Shaw MD;  Location:  GI     ESOPHAGOSCOPY, GASTROSCOPY, DUODENOSCOPY (EGD), COMBINED N/A 8/2/2019    Procedure: ESOPHAGOGASTRODUODENOSCOPY (EGD);  Surgeon: Pavan Arguello MD;  Location:  GI     ESOPHAGOSCOPY, GASTROSCOPY, DUODENOSCOPY (EGD), COMBINED N/A 9/25/2019    Procedure: ESOPHAGOGASTRODUODENOSCOPY (EGD);  Surgeon: Pavan Arguello MD;  Location:  GI     ESOPHAGOSCOPY, GASTROSCOPY, DUODENOSCOPY (EGD), COMBINED N/A 3/8/2020    Procedure: ESOPHAGOGASTRODUODENOSCOPY (EGD);  Surgeon: Rosendo Shaw MD;  Location:  GI     ESOPHAGOSCOPY, GASTROSCOPY, DUODENOSCOPY (EGD), COMBINED N/A 6/11/2020    Procedure: ESOPHAGOGASTRODUODENOSCOPY (EGD);  Surgeon: Rosendo Shaw MD;  Location:  GI     HERNIA REPAIR  2017    Abdominal     LAMINECTOMY, FUSION LUMBAR THREE+ LEVEL, COMBINED N/A 9/22/2014    Procedure: COMBINED LAMINECTOMY, FUSION LUMBAR THREE+ LEVEL;  Surgeon: Sebastien Pruitt MD;  Location: [facility]     TONSILLECTOMY & ADENOIDECTOMY         Social History   I have reviewed this patient's social history and updated it with pertinent information if needed.  Social History     Tobacco Use     Smoking status: Current Every Day Smoker     Packs/day: 1.00     Types: Cigarettes     Smokeless tobacco: Never Used     Tobacco comment: Chantix caused nightmares   Substance Use Topics     Alcohol use: Yes      Comment: hx etoh, drinks vodka      Drug use: No       Family History   I have reviewed this patient's family history and updated it with pertinent information if needed.  Family History   Problem Relation Age of Onset     Mental Illness Son      Coronary Artery Disease Early Onset Mother      Substance Abuse Father      Lung Cancer Father      Substance Abuse Brother      Unknown/Adopted No family hx of      Depression No family hx of      Anxiety Disorder No family hx of      Schizophrenia No family hx of      Bipolar Disorder No family hx of      Suicide No family hx of      Dementia No family hx of      Warren Disease No family hx of      Parkinsonism No family hx of      Autism Spectrum Disorder No family hx of      Intellectual Disability (Mental Retardation) No family hx of        Prior to Admission Medications   Prior to Admission Medications   Prescriptions Last Dose Informant Patient Reported? Taking?   QUEtiapine (SEROQUEL) 100 MG tablet  Self Yes No   Sig: Take 100 mg by mouth 2 times daily as needed (moderate agitation)    atenolol (TENORMIN) 25 MG tablet  Self Yes No   Sig: Take 25 mg by mouth daily   fluticasone-vilanterol (BREO ELLIPTA) 200-25 MCG/INH inhaler  Self Yes No   Sig: Inhale 1 puff into the lungs daily as needed Patient stated that he takes as needed when smoking   folic acid (FOLVITE) 1 MG tablet  Self Yes No   Sig: Take 1 mg by mouth daily   furosemide (LASIX) 20 MG tablet  Self Yes No   Sig: Take 10 mg by mouth daily   hydrOXYzine (ATARAX) 50 MG tablet  Self Yes No   Sig: Take  mg by mouth 3 times daily as needed for itching   magnesium oxide (MAG-OX) 400 MG tablet  Self Yes No   Sig: Take 400 mg by mouth daily   methocarbamol (ROBAXIN) 500 MG tablet  Self Yes No   Sig: Take 500 mg by mouth 3 times daily as needed for muscle spasms   mirtazapine (REMERON) 30 MG tablet  Self Yes No   Sig: Take 30 mg by mouth At Bedtime   multivitamin w/minerals (THERA-VIT-M) tablet  Self No  No   Sig: Take 1 tablet by mouth daily   pantoprazole (PROTONIX) 40 MG EC tablet  Self Yes No   Sig: Take 40 mg by mouth 2 times daily    potassium chloride ER (KLOR-CON M) 10 MEQ CR tablet  Self Yes No   Sig: Take 20 mEq by mouth 2 times daily   spironolactone (ALDACTONE) 25 MG tablet   No No   Sig: Take 1 tablet (25 mg) by mouth daily   tamsulosin (FLOMAX) 0.4 MG capsule  Self Yes No   Sig: Take 0.4 mg by mouth daily   traZODone (DESYREL) 100 MG tablet  Self Yes No   Sig: Take 100 mg by mouth nightly as needed for sleep   vancomycin (VANCOCIN) 125 MG capsule   No No   Sig: Take 1 capsule (125 mg) by mouth 4 times daily   vortioxetine (TRINTELLIX) 10 MG tablet  Self No No   Sig: Take 1 tablet (10 mg) by mouth daily      Facility-Administered Medications: None     Allergies   Allergies   Allergen Reactions     Amlodipine Swelling     Lisinopril      Other reaction(s): Angioedema  Mouth and tongue swelling   Mouth and tongue swelling          Physical Exam   Vital Signs: Temp: 97.3  F (36.3  C)   BP: (!) 146/66 Pulse: 70   Resp: 18 SpO2: 96 %      Weight: 0 lbs 0 oz    Constitutional:   awake, alert, not cooperative, no apparent distress, and appears stated age     Eyes:   Lids and lashes normal, pupils equal, round and reactive to light, extra ocular muscles intact, sclera clear, conjunctiva normal     ENT:   Normocephalic, without obvious abnormality, atramatic, wearing mask.      Neck:   Supple, symmetrical, trachea midline, no adenopathy, thyroid symmetric, not enlarged and no tenderness, skin normal     Lungs:   No increased work of breathing, good air exchange, clear to auscultation bilaterally, no crackles or wheezing     Cardiovascular:   Regular rate and rhythm, normal S1 and S2, no S3 or S4, and no murmur noted. Extremities are warm. No edema.      Abdomen:   Normal bowel sounds, distending with fluid wave and mild TTP diffusely. no masses palpated, no hepatosplenomegally     Musculoskeletal:   There is no  redness, warmth, or swelling of the joints. Normal build.      Neurologic:   Awake, alert, oriented to name, place and time.  Cranial nerves II-XII are grossly intact.  Moving a 4 extremities without gross focal weakness.     Neuropsychiatric:   General: poor eye contact, flat     Skin:   no bruising or bleeding, no redness, warmth, or swelling and no rashes         Data   Data reviewed today: I reviewed all medications, new labs and imaging results over the last 24 hours. I personally reviewed no images or EKG's today.    Recent Labs   Lab 10/14/20  1530 10/10/20  0843 10/09/20  0723 10/09/20  0335 10/08/20  2340   WBC 5.5 5.4 4.6  --   --    HGB 10.2* 9.1* 8.5*  --   --    MCV 91 93 92  --   --     54* 71*  --   --     132* 136  --   --    POTASSIUM 3.9 4.0 3.7  --   --    CHLORIDE 110* 106 107  --   --    CO2 22 20 27  --   --    BUN 27 20 22  --   --    CR 1.63* 1.73* 1.80*  --   --    ANIONGAP 10 6 2*  --   --    KEV 8.2* 7.8* 7.8*  --   --    GLC 76 119* 120*  --   --    ALBUMIN 3.0* 2.5* 2.5*  --   --    PROTTOTAL 7.4  --   --   --   --    BILITOTAL 0.6  --   --   --   --    ALKPHOS 177*  --   --   --   --    ALT 20  --   --   --   --    AST 36  --   --   --   --    LIPASE 236  --   --   --  298   TROPI  --   --  <0.015 <0.015 <0.015     No results found for this or any previous visit (from the past 24 hour(s)).

## 2020-10-15 NOTE — PLAN OF CARE
Cognitive Concerns/ Orientation : A&O x4. Anxious at times.   BEHAVIOR & AGGRESSION TOOL COLOR: Green   CIWA SCORE: 8, 5, 9 (mainly for tremor, anxiety). 1 mg PO ativan given x2.   ABNL VS/O2: VSS on RA   MOBILITY: SBA  PAIN MANAGMENT: c/o left low back/hip pain, managed with Tylenol x1 and hot pack.   DIET: 2g Na, tolerating  BOWEL/BLADDER: continent, up to bathroom   ABNL LAB/BG: Creat 1.71; WBC 3.4; Hgb 9.5.   DRAIN/DEVICES: PIV, infusing IVF.   SKIN: Bruises; skin tear on R arm, mepilex in place.   TESTS/PROCEDURES: n/a  D/C DAY/GOALS/PLACE: pending progress  OTHER IMPORTANT INFO: Seen by Psych; suicide precautions and sitter in place; pt denies any suicidal ideation at this time. Abd rounded/distended. Seroquel x1 for anxiety.     Current condition (current mood & behavior): calm, cooperative, anxious  Sitter present: yes  Every 15 minute documentation by NA/RN completed for Shift: yes  Room safety Suicide Checklist completed in Epic: yes  Patient's color of severity (suicide scale):   Order for psych consult placed (if appropriate): yes - seen by psych today   Suicide care plan added: yes      Shannan Root RN

## 2020-10-15 NOTE — PROGRESS NOTES
[facility]    Hospitalist Progress Note      Assessment & Plan   Jim Richard is a 65 year old male was admitted on 10/14/2020 with PMH use disorder, cirrhosis, depression, recent hospitalization for intentional overdose, multiple hospitalization for intoxication who is being admitted again on 10/14/2020, due to continued drinking and suicidal ideation, stating that he would take all of his medications at once.  Recently hospitalized from 10/6 - 10/11 for alcohol intoxication with metabolic acidosis and C. difficile.  After discharge he states he stopped taking all of his medications and has been drinking because he does not care whether he lives or dies.    Depression, anxiety, suicidal ideation   Acute alcohol intoxication  Severe alcohol use disorder - multiple recent admission for intoxication at risk of life-threateningillness/death  Patient has been through treatment multiple times in the past. He states he stating drinking again right after discharge. BAL 0.36 at admission.    - CIWA with ativan ordered.  - Psychiatry consulted. We may need to pursue commitment.   Discussed with SW, may need to consider State ordered commitment.  Will await Psychiatry opinion and Chemical Dependency consult.   -Continue PTA mirtazapine, trintellix and  Trazodone, Atarax as needed as well.      Alcoholic cirrhosis with history of esophageal varices and ascites - History of GI bleed.  Grade 1 esophageal varices with portal gastropathy. He c/o diffuse abdominal discomfort and has obvious fluid wave on exam with diffuse mild tenderness on exam. Last paracentesis on 10/9 was negative for SBP and patient currently refusing paracentesis stating pain no different now than prior to that procedure.   -Defer paracentesis until medically more stable and out of withdrawal window.  - Holding lasix and spironolactone in context of lactic acidosis. Resume if remains hemodynamically stable  - continue PPI   -  continue PTA potassium BID and magnesium   - high dose thiamine and multi-vitamins, folic acid ordered.   - continue PTA atenolol      Lactic acidosis secondary to above.  Hemodynamically stable with low suspicion for infection / sepsis  -DC IVF, taking in p.o.'s.  -Follow-up lactic acid level is ordered.     C. difficile colitis, diagnosed during his last admission CT A/P with circumferential wall thickening of rectum suggesting distal colitis/proctitis. procalcitonin low at 0.07. WBC 7.7. Afebrile.  Not taking vancomycin and currently having 2-3 loose stools a day.  - oral vancomycin 125 mg every 6 hours with plan to continue to complete 10 more days as ordered at discharge.     Chronic kidney disease stage III  Cr 1.63, Baseline creatinine 1.4-1.9.      Chronic anemia, nl MCV, stable  - hgb 10.2      BPH  -Continue flomax     JANIS,   - home CPAP ordered        DVT Prophylaxis: Pneumatic Compression Devices  Code Status: Full Code  Expected discharge: TBD pending Psychiatry and Chemical Dependency clearance.    Rosario Schwab MD  Text Page (7am - 6pm, M-F)    Interval History   Patient feels well, no tremor, anxiety, eating.  Sitter reports no acute behavioral issues.  He acknowledges he has an alcohol problem and after most recent treatment resumed drinking. He is not forthcoming in terms of the reason he restarted drinking, currently no direct suicidal ideation.  Continued loose stool 2-3/day, no abdominal pain, nausea, emesis.  Afebrile.  Eating.      Physical Exam   Temp: 98.9  F (37.2  C) Temp src: Oral BP: 139/79 Pulse: 65   Resp: 18 SpO2: 92 % O2 Device: None (Room air)    There were no vitals filed for this visit.  Vital Signs with Ranges  Temp:  [97.3  F (36.3  C)-98.9  F (37.2  C)] 98.9  F (37.2  C)  Pulse:  [65-72] 65  Resp:  [16-18] 18  BP: (128-146)/(58-79) 139/79  SpO2:  [92 %-98 %] 92 %  I/O last 3 completed shifts:  In: 2000 [IV Piggyback:2000]  Out: -     General/Constitutional:  No acute  distress, alert, calm, cooperative  HEENT/Head Exam:  atraumatic  Eyes:  PERRL, no conjunctivits  Mouth/Oral Pharynx:  Buccal mucosa WNL  Chest/Respiratory:  Air exchange bilateral lung fields; no rales or wheeze. Respiration nonlabored.  Cardiovascular: No murmur appreciated.    Gastrointestinal/Abdomen:  soft, nontender, mild ascites  Musculoskeletal:  extremities warm, dry, noncyanotic; no acitve synovitis.  Neurologic: No flap.  Gross CN and motor testing  nonfocal.  Sensation grossly tested intact.  Psychiatric:  Mental status:  Alert, oriented, affect calm  Skin:  No rash noted on gross exam    Medications     dextrose 5% and 0.9% NaCl with potassium chloride 20 mEq 100 mL/hr at 10/15/20 1013       atenolol  25 mg Oral Daily     folic acid  1 mg Oral Daily     magnesium oxide  400 mg Oral Daily     mirtazapine  30 mg Oral At Bedtime     multivitamin w/minerals  1 tablet Oral Daily     nicotine  1 patch Transdermal Daily     nicotine   Transdermal Q8H     pantoprazole  40 mg Oral BID     potassium chloride ER  20 mEq Oral BID     tamsulosin  0.4 mg Oral Daily     thiamine  500 mg Intravenous TID    Followed by     [START ON 10/17/2020] thiamine  250 mg Intravenous Daily    Followed by     [START ON 10/22/2020] thiamine  100 mg Oral Daily     vancomycin  125 mg Oral 4x Daily     vortioxetine  10 mg Oral Daily       Data   Recent Labs   Lab 10/15/20  0952 10/14/20  1530 10/10/20  0843 10/09/20  0723 10/09/20  0335 10/09/20  0335 10/08/20  2340   WBC 3.4* 5.5 5.4 4.6   < >  --   --    HGB 9.5* 10.2* 9.1* 8.5*   < >  --   --    MCV 91 91 93 92   < >  --   --     271 54* 71*   < >  --   --     142 132* 136   < >  --   --    POTASSIUM 4.1 3.9 4.0 3.7   < >  --   --    CHLORIDE 109 110* 106 107   < >  --   --    CO2 23 22 20 27   < >  --   --    BUN 24 27 20 22   < >  --   --    CR 1.71* 1.63* 1.73* 1.80*   < >  --   --    ANIONGAP 6 10 6 2*   < >  --   --    KEV 8.2* 8.2* 7.8* 7.8*   < >  --   --    GLC  114* 76 119* 120*   < >  --   --    ALBUMIN 2.9* 3.0* 2.5* 2.5*   < >  --   --    PROTTOTAL 6.9 7.4  --   --   --   --   --    BILITOTAL 0.8 0.6  --   --   --   --   --    ALKPHOS 165* 177*  --   --   --   --   --    ALT 18 20  --   --   --   --   --    AST 33 36  --   --   --   --   --    LIPASE  --  236  --   --   --   --  298   TROPI  --   --   --  <0.015  --  <0.015 <0.015    < > = values in this interval not displayed.

## 2020-10-15 NOTE — PLAN OF CARE
DATE & TIME: 10/14 1047-5189  Cognitive Concerns/ Orientation : A/Ox4  BEHAVIOR & AGGRESSION TOOL COLOR: yellow   CIWA SCORE: 16, 11, 7, 7 ativan given per protocol     ABNL VS/O2: VSS ex ocassionally HTN   MOBILITY: SBA  PAIN MANAGMENT: Tylenol available  DIET: 2g NA   BOWEL/BLADDER: continent  ABNL LAB/BG: BARNEY 0.36, Mag 2.5  DRAIN/DEVICES: R PIV infusing IVF  SKIN: intact, bruised, skin tear on L wrist with mepilex  TESTS/PROCEDURES: NA  D/C DAY/GOALS/PLACE: pending progress  OTHER IMPORTANT INFO: C-Diff +, sitter at bedside due to suicidal ideation, rounded/distended abd with bandage from previous paracentesis, Nicotine patch on R shoulder, PRN Seroquel available

## 2020-10-16 PROCEDURE — 999N000216 HC STATISTIC ADULT CD FACE TO FACE-NO CHRG

## 2020-10-16 PROCEDURE — 258N000003 HC RX IP 258 OP 636: Performed by: INTERNAL MEDICINE

## 2020-10-16 PROCEDURE — 250N000011 HC RX IP 250 OP 636: Performed by: INTERNAL MEDICINE

## 2020-10-16 PROCEDURE — 250N000013 HC RX MED GY IP 250 OP 250 PS 637: Performed by: INTERNAL MEDICINE

## 2020-10-16 PROCEDURE — 258N000001 HC RX 258: Performed by: INTERNAL MEDICINE

## 2020-10-16 PROCEDURE — 120N000001 HC R&B MED SURG/OB

## 2020-10-16 PROCEDURE — 99232 SBSQ HOSP IP/OBS MODERATE 35: CPT | Performed by: HOSPITALIST

## 2020-10-16 RX ORDER — HYDROMORPHONE HYDROCHLORIDE 1 MG/ML
0.3 INJECTION, SOLUTION INTRAMUSCULAR; INTRAVENOUS; SUBCUTANEOUS EVERY 4 HOURS PRN
Status: DISCONTINUED | OUTPATIENT
Start: 2020-10-16 | End: 2020-10-17

## 2020-10-16 RX ADMIN — HYDROXYZINE HYDROCHLORIDE 50 MG: 25 TABLET, FILM COATED ORAL at 00:48

## 2020-10-16 RX ADMIN — MIRTAZAPINE 30 MG: 15 TABLET, FILM COATED ORAL at 21:19

## 2020-10-16 RX ADMIN — VANCOMYCIN HYDROCHLORIDE 125 MG: 125 CAPSULE ORAL at 17:55

## 2020-10-16 RX ADMIN — POTASSIUM CHLORIDE 20 MEQ: 1500 TABLET, EXTENDED RELEASE ORAL at 09:28

## 2020-10-16 RX ADMIN — POTASSIUM CHLORIDE, DEXTROSE MONOHYDRATE AND SODIUM CHLORIDE: 150; 5; 900 INJECTION, SOLUTION INTRAVENOUS at 18:54

## 2020-10-16 RX ADMIN — METHOCARBAMOL 500 MG: 500 TABLET ORAL at 00:48

## 2020-10-16 RX ADMIN — VANCOMYCIN HYDROCHLORIDE 125 MG: 125 CAPSULE ORAL at 09:29

## 2020-10-16 RX ADMIN — PANTOPRAZOLE SODIUM 40 MG: 40 TABLET, DELAYED RELEASE ORAL at 09:29

## 2020-10-16 RX ADMIN — HYDROMORPHONE HYDROCHLORIDE 0.3 MG: 1 INJECTION, SOLUTION INTRAMUSCULAR; INTRAVENOUS; SUBCUTANEOUS at 21:20

## 2020-10-16 RX ADMIN — THIAMINE HYDROCHLORIDE 500 MG: 100 INJECTION, SOLUTION INTRAMUSCULAR; INTRAVENOUS at 10:06

## 2020-10-16 RX ADMIN — ACETAMINOPHEN 650 MG: 325 TABLET, FILM COATED ORAL at 02:35

## 2020-10-16 RX ADMIN — THIAMINE HYDROCHLORIDE 500 MG: 100 INJECTION, SOLUTION INTRAMUSCULAR; INTRAVENOUS at 16:09

## 2020-10-16 RX ADMIN — VANCOMYCIN HYDROCHLORIDE 125 MG: 125 CAPSULE ORAL at 12:34

## 2020-10-16 RX ADMIN — POTASSIUM CHLORIDE, DEXTROSE MONOHYDRATE AND SODIUM CHLORIDE: 150; 5; 900 INJECTION, SOLUTION INTRAVENOUS at 07:58

## 2020-10-16 RX ADMIN — HYDROXYZINE HYDROCHLORIDE 50 MG: 25 TABLET, FILM COATED ORAL at 09:29

## 2020-10-16 RX ADMIN — POTASSIUM CHLORIDE 20 MEQ: 1500 TABLET, EXTENDED RELEASE ORAL at 21:19

## 2020-10-16 RX ADMIN — QUETIAPINE FUMARATE 100 MG: 100 TABLET ORAL at 00:49

## 2020-10-16 RX ADMIN — Medication 1 MG: at 02:36

## 2020-10-16 RX ADMIN — METHOCARBAMOL 500 MG: 500 TABLET ORAL at 13:57

## 2020-10-16 RX ADMIN — ACETAMINOPHEN 650 MG: 325 TABLET, FILM COATED ORAL at 13:56

## 2020-10-16 RX ADMIN — MULTIPLE VITAMINS W/ MINERALS TAB 1 TABLET: TAB at 09:29

## 2020-10-16 RX ADMIN — FOLIC ACID 1 MG: 1 TABLET ORAL at 09:28

## 2020-10-16 RX ADMIN — NICOTINE 1 PATCH: 21 PATCH, EXTENDED RELEASE TRANSDERMAL at 09:35

## 2020-10-16 RX ADMIN — MAGNESIUM OXIDE 400 MG: 400 TABLET ORAL at 09:29

## 2020-10-16 RX ADMIN — VANCOMYCIN HYDROCHLORIDE 125 MG: 125 CAPSULE ORAL at 21:20

## 2020-10-16 RX ADMIN — NICOTINE POLACRILEX 2 MG: 2 LOZENGE ORAL at 06:00

## 2020-10-16 RX ADMIN — PANTOPRAZOLE SODIUM 40 MG: 40 TABLET, DELAYED RELEASE ORAL at 21:20

## 2020-10-16 RX ADMIN — ACETAMINOPHEN 650 MG: 325 TABLET, FILM COATED ORAL at 09:45

## 2020-10-16 RX ADMIN — HYDROXYZINE HYDROCHLORIDE 50 MG: 25 TABLET, FILM COATED ORAL at 17:55

## 2020-10-16 RX ADMIN — METHOCARBAMOL 500 MG: 500 TABLET ORAL at 22:44

## 2020-10-16 RX ADMIN — ATENOLOL 25 MG: 25 TABLET ORAL at 09:29

## 2020-10-16 RX ADMIN — TAMSULOSIN HYDROCHLORIDE 0.4 MG: 0.4 CAPSULE ORAL at 09:29

## 2020-10-16 RX ADMIN — VORTIOXETINE 10 MG: 10 TABLET, FILM COATED ORAL at 09:29

## 2020-10-16 RX ADMIN — QUETIAPINE FUMARATE 100 MG: 100 TABLET ORAL at 16:09

## 2020-10-16 ASSESSMENT — ACTIVITIES OF DAILY LIVING (ADL)
ADLS_ACUITY_SCORE: 14
ADLS_ACUITY_SCORE: 13
ADLS_ACUITY_SCORE: 9.5
ADLS_ACUITY_SCORE: 9.5
ADLS_ACUITY_SCORE: 14
ADLS_ACUITY_SCORE: 13

## 2020-10-16 NOTE — PROGRESS NOTES
Care Management Follow Up Note    Length of Stay (days) 2    Patient plan of care discussed at Interdisciplinary Rounds: yes  Expected Discharge Date:  TBD  Concerns to be Addressed:   Petition for commitment process     Anticipated Discharge Disposition:  TBD   Anticipated Discharge Services:  TBD   Anticipated Discharge DME:  None    Plan:  Petition for MI/CD commitment signed by administration and faxed to [facility] along with psych note. Voicemail left for screener, Dez, with [facility], [phone].    JULIA John, LGSW  [phone]  [facility]

## 2020-10-16 NOTE — CONSULTS
10/16/20 chem dep consult acknowledged and closed.      Patient is familiar to chem dep services from multiple previous hospitalizations.  Patient has refused to follow through with substance use treatment in the past given the financial barriers.  Patient has Medicare which does limit placement and is over income to qualify for Rule 25 funding.  He was most recently consulted in June 2020 by myself but no formal evaluation was conducted due to recommendation for IRTS placement and revocation of stay of commitment.  I communicated with unit  today who confirmed a petition for commitment was filed.  At this time, chem dep will close consult please reorder consult once determination is made by [facility] on petition for commitment.    Sherly Royal, [facility]  [phone]

## 2020-10-16 NOTE — CONSULTS
10/16/20 chem dep consult acknowledged.    Patient is familiar to chem dep from previous admissions.  I emailed unit social workers to obtain more information that could affect patient's appropriateness for substance use treatment placement.  In June 2020, chem dep was last consulted and no evaluation or recommendations were made as patient was on a civil commitment and it was being revoked with recommendation to IRTS facility.  Will complete consult once coordination can be done.    Sherly Royal, [facility]  [phone]

## 2020-10-16 NOTE — PROVIDER NOTIFICATION
MD Notification    Notified Person: MD    Notified Person Name: MD Lauronabiljoe    Notification Date/Time: 10/16/2020 1544    Notification Interaction: Via webpage    Purpose of Notification: Pt Psych consult is missing. Can you please order.  Orders Received:    Comments:

## 2020-10-16 NOTE — PLAN OF CARE
DATE & TIME: 10/15/2020 4708-3171    Cognitive Concerns/ Orientation : A&O4   BEHAVIOR & AGGRESSION TOOL COLOR: green, very pleasant and cooperative this shift. Sitter at bedside  CIWA SCORE: 4, 9, 6, 5   ABNL VS/O2: /78, other VSS on RA  MOBILITY: SBA, ambulated in halls x2 this shift. Steady on feet, declines GB.   PAIN MANAGMENT: robaxin given x1 for back and hip spasms, tylenol x1 , heat applied   DIET: 2g Na, tolerating well, denies n/v  BOWEL/BLADDER: continent   ABNL LAB/BG: WBC 3.4  DRAIN/DEVICES: PIV infusing D5NS+20KCl 100mL/hr need to order from pharmacy  SKIN: bruising, R arm skin tear--mepilex CDI  D/C DAY/GOALS/PLACE: pending clinical improvement   OTHER IMPORTANT INFO: LS clear, CD consulted. Pt has expressed to writer a desire to get better. Talked about how moving closer to family may help with recovery and staying sober after treatment. Denies suicidal ideation and thoughts of self-harm this shift.     Current condition (current mood & behavior): calm, cooperative, pleasant  Sitter present: yes  Every 15 minute documentation by NA/RN completed for Shift: yes  Room safety Suicide Checklist completed in Epic: yes  Patient's color of severity (suicide scale): YELLOW  Order for psych consult placed (if appropriate): yes--seen today  Suicide care plan added: yes      Lauren M. Meese

## 2020-10-16 NOTE — PROGRESS NOTES
DATE & TIME: 10/15/2020 Night   Cognitive Concerns/ Orientation : Alert/Oriented x 4. Restless overnight   BEHAVIOR & AGGRESSION TOOL COLOR: Red (though green this shift). Calm and cooperative. Sitter at bedside  CIWA SCORE: 4 (tremor/anxiety), 5 (tremor/anxiety)   ABNL VS/O2: /72 otherwise VSS, room air  MOBILITY: SBA  PAIN MANAGMENT: Tylenol (Q4) x 1; Robaxin (Q8) for back/hip spasms; heat pack  DIET: 2 Gram sodium  BOWEL/BLADDER: Up to bathroom, continent  ABNL LAB/BG: Creatinine 1.71, GFR 41; Alk phos 165; Hgb 9.5; Hematocrit 29.7  DRAIN/DEVICES: R PIV infusing D5 + normal saline + 20 mEq Potassium at 100 mL/hour (stocked from Main Pharmacy)  SKIN: Bruising; skin tear on R arm covered with mepilex, skin tear on L elbow covered with mepilex  TESTS/PROCEDURES: n/a  D/C DAY/GOALS/PLACE: Pending psych/chem dep clearance  OTHER IMPORTANT INFO: Pt requesting stronger medications to help with sleep. Melatonin (1 mg) given, but ineffective. Pt unable to sleep at all and reports this is second sleepless night.  COMMIT TO SIT DONE AND SIGNED OFF YES

## 2020-10-16 NOTE — PLAN OF CARE
Cognitive Concerns/ Orientation : A&O4   BEHAVIOR & AGGRESSION TOOL COLOR: green, very pleasant and cooperative this shift. Sitter at bedside  CIWA SCORE: 4 and 5 this shift    ABNL VS/O2: /84, other VSS on RA  MOBILITY: SBA, ambulated in halls x2 this shift. Steady on feet, declines GB.   PAIN MANAGMENT: robaxin given x1 for back and hip spasms, tylenol x1 , c/o anxiety given atarax x1 sleeping upon reassessment   DIET: 2g Na, tolerating well, denies n/v  BOWEL/BLADDER: continent   ABNL LAB/BG: WBC 3.4  DRAIN/DEVICES: PIV infusing D5NS+20KCl 100mL/hr need to order from pharmacy  SKIN: bruising, R arm skin tear--mepilex CDI  D/C DAY/GOALS/PLACE: pending clinical improvement   OTHER IMPORTANT INFO: PEREZ clear, CD consulted. Patient denies suicidal ideation and thoughts of self-harm this shift.     Current condition (current mood & behavior): calm, cooperative, pleasant  Sitter present: yes  Every 15 minute documentation by NA/RN completed for Shift: yes  Room safety Suicide Checklist completed in Epic: yes  Patient's color of severity (suicide scale): NA Patient denies suicidal ideation and thoughts of self-harm this shift.   Order for psych consult placed (if appropriate): yes- completed 10/15 patient expresses desire for anxiety management will discuss during follow up 10/16- given atarax x1 patient sleeping on follow up  Suicide care plan added: yes

## 2020-10-16 NOTE — CONSULTS
"CLINICAL NUTRITION SERVICES  -  ASSESSMENT NOTE    Recommendations Ordered by Registered Dietitian (RD):   - Strawberry Banana Protein Smoothie BID w/ meals   - Micronutrients as ordered    Malnutrition: (10/16)   % Weight Loss:  Weight loss does not meet criteria for malnutrition   % Intake:  Decreased intake does not meet criteria for malnutrition - pt states was eating meals TID at last facility   Subcutaneous Fat Loss:  Orbital region mild depletion and Upper arm region mild depletion  Muscle Loss:  Temporal region mild depletion, Clavicle bone region mild depletion and Dorsal hand region mild depletion  Fluid Retention:  None noted    Malnutrition Diagnosis: Non-Severe malnutrition  In Context of:  Chronic illness or disease  Environmental or social circumstances     REASON FOR ASSESSMENT  Jim Richard is a 65 year old male seen by Registered Dietitian for Admission Nutrition Risk Screen for reduced oral intake over the last month    NUTRITION HISTORY  - Information obtained from chart review and patient report.   - Patient is familiar to clinical nutrition services given his frequent admissions.   - Historically he eats well during admissions, not as well when outside of the hospital.   - PMH includes alcoholism, alcoholic cirrhosis, depression, hypertension, COPD, and CAD.   - Presented with suicidal ideation and intoxication. He is positive for CDiff   - Endorsed ongoing diarrhea, some nausea, and decreased appetite since discharging.   - Medical record indicates that pt has had up to 5 ED visits for similar problems in the last 30 days.     - visited w/ patient this morning.   - He states that he was at a \"rehab place\" prior to this admission. There he received meals TID, and he states he was actually eating alright.   - The food wasn't great, but he generally consumed meals TID. ---> Unclear how recently he was at this facility, as chart review indicates that he has not been maintaining sobriety, and " "has had multiple recent admissions for intoxication.   - Endorsed a weight loss of 10#, states that he has since gained 5# back.   - Liked the strawberry banana smoothies last time he was here - requests these again.       CURRENT NUTRITION ORDERS  Diet Order:     2000 mg Sodium     Current Intake/Tolerance:  Eating 100% of meals since admission. Meal review shows pt ordered 3 meals yesterday for a total of 1745 kcal, 64 g pro.   Patient states that his appetite is fair. He is not fond of the 2g na diet \"doesn't taste very good\".   Requests supplements.     NUTRITION FOCUSED PHYSICAL ASSESSMENT FOR DIAGNOSING MALNUTRITION)  Yes          Observed:    Muscle wasting (refer to documentation in Malnutrition section) and Subcutaneous fat loss (refer to documentation in Malnutrition section)    Obtained from Chart/Interdisciplinary Team:  Last BM 10/15  Rex - Nutrition 3; Total 20    ANTHROPOMETRICS  Height: 5' 7\"  Weight:  80.9 kg (10/11/2020)   BMI: 27.93 kg/m2  Weight Status:  Overweight BMI 25-29.9  IBW: 67.3 kg   % IBW: 120%  Weight History: weight trending for the past 8 months appears relatively stable. Patient endorses a 10# loss while admitted to a rehab facility. Not seen on weight review.   Wt Readings from Last 10 Encounters:   10/11/20 80.9 kg (178 lb 5.6 oz)   10/05/20 79.4 kg (175 lb)   08/20/20 79.4 kg (175 lb)   07/30/20 79.4 kg (175 lb)   07/06/20 81.6 kg (180 lb)   07/02/20 79 kg (174 lb 1.6 oz)   05/26/20 86.2 kg (190 lb)   05/03/20 84.1 kg (185 lb 6.4 oz)   04/09/20 82.1 kg (181 lb)   02/25/20 82.3 kg (181 lb 8 oz)       LABS  Labs reviewed    MEDICATIONS  Medications reviewed  Folic Acid 1 mg, Thera vit M daily, Thiamine - withdrawal protocol   Magnesium oxide, KCl - supplementation  Remeron 30 mg daily - depression, may also act as appetite stimulant    D5 + NaCl IVF @ 100 mL/hr --> 120 g dextrose, 408 kcal daily from dextrose     ASSESSED NUTRITION NEEDS PER APPROVED PRACTICE GUIDELINES:  Dosing " Weight 70.7 kg - adjusted   Estimated Energy Needs: 7062-8906 kcals (25-30 Kcal/Kg)  Justification: maintenance  Estimated Protein Needs:  grams protein (1.2-1.5 g pro/Kg)  Justification: preservation of lean body mass  Estimated Fluid Needs: 1 mL/kcal  Justification: maintenance    MALNUTRITION:  % Weight Loss:  Weight loss does not meet criteria for malnutrition   % Intake:</= 75% for >/= 1 month (severe malnutrition)   Subcutaneous Fat Loss:  Orbital region mild depletion and Upper arm region mild depletion  Muscle Loss:  Temporal region mild depletion, Clavicle bone region mild depletion and Dorsal hand region mild depletion  Fluid Retention:  None noted    Malnutrition Diagnosis: Non-Severe malnutrition  In Context of:  Chronic illness or disease  Environmental or social circumstances      NUTRITION DIAGNOSIS:  Malnutrition related to inconsistent food access/intake outside of hospital due to chronic alcohol abuse as evidenced by frequent re-admissions for similar problems, e/o fat and muscle wasting.       NUTRITION INTERVENTIONS  Recommendations / Nutrition Prescription  Diet per MD - 2g Na    Protein Smoothies BID between meals     Implementation  Nutrition education: Provided education on protein for muscle integrity. Encouraged adequate protein w/ each meal.   Medical Food Supplement: as above       Nutrition Goals  Intake of at least 75% adequate meals TID.       MONITORING AND EVALUATION:  Progress towards goals will be monitored and evaluated per protocol and Practice Guidelines    Summer Isaac RD, LD  [facility], [facility] [facility], MH   Pager: [phone]  Weekend Pager: [phone]

## 2020-10-16 NOTE — CONSULTS
[facility]  Psychiatry Consultation      Patient name: Jim Richard   MRN: [id number]    Age: 65 year old    YOB: 1955    Identifying information:   The patient is a 65 year old White male who is currently admitted to the medical service on unit 66.    Reason for consult: Evaluate alcohol use disorder, depressed mood, and suicidal ideation    History of present illness:   The patient has a history of alcohol use disorder and major depressive disorder along with various medical comorbidities and complications related to his substance use disorder.  Patient also familiar to me from prior hospitalizations.  Since the patient's involuntary commitment that  earlier this year, he has not demonstrated ability to maintain sobriety and presented to the emergency room multiple times in the setting of alcohol intoxication and suicidal ideation leading to frequent hospitalizations for medical interventions.  He has continued to accumulate medical consequences related to his alcohol usage.  Records indicate at least 5 admissions to the emergency room within the past 30 days.  During this admission, blood alcohol level was 0.36 and he was expressing suicidal ideation.  Psychiatric consultation was requested to evaluate for commitment while addressing his depressive symptoms.  On examination today, patient confirms inability to maintain sobriety outside a controlled setting.  He continues to feel depressed given the very stressors he has accumulated which seem to be related to his substance use disorder.  Suicidal thoughts continue as he ruminates on his precautions.  He is interested in antidepressant treatment for his mood disorder.  He is ambivalent regarding pursuit of residential treatment.    Psychiatric Review of Systems:    -- Depressive episode: Mood is depressed, characterized as severe, accompanied with low energy, low appetite, anhedonia, feels helpless and hopeless,  suicidal thoughts are present, denies homicidal thoughts.  -- Uma:  denies symptoms  -- Psychosis:  denies symptoms  -- Anxiety: denies symptoms corresponding to MARIA FERNANDA or panic disorder  -- PTSD: denies symptoms  -- OCD: denies  symptoms  -- Eating disorder: denies symptoms    Psychiatric History:    The patient has an established diagnosis of major depressive disorder and sees Dr. Rand for outpatient care.  He is prescribed Trintellix for antidepressant treatment.  This appears to be of psychosis overdose in the setting of suicidal ideation and alcohol intoxication.  1 prior inpatient psychiatric hospitalization in May 2020.    Substance Use History:    Drug of choice is alcohol with pattern of usage corresponding to dependence, further reporting tolerance, loss of control over usage, progressive usage, drinking to the point of blackout and intoxication, developing various medical consequences related to his usage, some of which have included liver cirrhosis, ascites, esophageal varices, and various functional limitations related to his use.  He has been to detox and treatment in the past, most recently at the [facility].  History of a stay of commitment for substance use disorder treatment which has .  He denied a history of withdrawal seizures or DTs.  No recent illicit substance usage reported.    Medical History:  Refer to the internal medicine notes which I reviewed.   Past Medical History:   Diagnosis Date     Alcoholism (H) 2013     Anxiety      Coronary artery disease 2014     Depression      Esophageal varices with bleeding 2018     Heart attack (H)      Hepatomegaly      Hyperlipemia      Hypertension      JANIS on CPAP      Peripheral vascular disease (H)      PVD (peripheral vascular disease) (H)      SDH (subdural hematoma) (H) 2018     Spinal stenosis      Substance abuse (H)           Current Facility-Administered Medications:       acetaminophen (TYLENOL) tablet 650 mg, 650 mg, Oral, Q4H PRN, Cindy Gordon MD, 650 mg at 10/15/20 1428     atenolol (TENORMIN) tablet 25 mg, 25 mg, Oral, Daily, Cindy Gordon MD, 25 mg at 10/15/20 1003     bisacodyl (DULCOLAX) Suppository 10 mg, 10 mg, Rectal, Daily PRN, Cindy Gordon MD     dextrose 5% and 0.9% NaCl with potassium chloride 20 mEq infusion, , Intravenous, Continuous, Cindy Gordon MD, Last Rate: 100 mL/hr at 10/15/20 1013     fluticasone-vilanterol (BREO ELLIPTA) 200-25 MCG/INH inhaler 1 puff, 1 puff, Inhalation, Daily PRN, Cindy Gordon MD     folic acid (FOLVITE) tablet 1 mg, 1 mg, Oral, Daily, Cindy Gordon MD, 1 mg at 10/15/20 1003     hydrOXYzine (ATARAX) tablet  mg,  mg, Oral, TID PRN, Cindy Gordon MD, 50 mg at 10/15/20 1731     LORazepam (ATIVAN) tablet 1-2 mg, 1-2 mg, Oral, Q30 Min PRN, 1 mg at 10/15/20 1732 **OR** LORazepam (ATIVAN) injection 1-2 mg, 1-2 mg, Intravenous, Q30 Min PRN, Cindy Gordon MD     magnesium oxide (MAG-OX) tablet 400 mg, 400 mg, Oral, Daily, Cindy Gordon MD, 400 mg at 10/15/20 1002     magnesium sulfate 4 g in 100 mL sterile water (premade), 4 g, Intravenous, Q4H PRN, Cindy Gordon MD     melatonin tablet 1 mg, 1 mg, Oral, At Bedtime PRN, Cindy Gordon MD     methocarbamol (ROBAXIN) tablet 500 mg, 500 mg, Oral, TID PRN, Cindy Gordon MD, 500 mg at 10/15/20 1745     mirtazapine (REMERON) tablet 30 mg, 30 mg, Oral, At Bedtime, Cindy Gordon MD, 30 mg at 10/14/20 2106     multivitamin w/minerals (THERA-VIT-M) tablet 1 tablet, 1 tablet, Oral, Daily, Cindy Gordon MD, 1 tablet at 10/15/20 1002     naloxone (NARCAN) injection 0.1-0.4 mg, 0.1-0.4 mg, Intravenous, Q2 Min PRN, Cindy Gordon MD     nicotine (COMMIT) lozenge 2 mg, 2 mg, Buccal, Q1H PRN, Cindy Gordon MD     nicotine (NICODERM CQ) 21 MG/24HR 24 hr patch 1 patch, 1 patch, Transdermal, Daily, Cindy Gordon MD, 1 patch at 10/15/20 1007     nicotine  Patch in Place, , Transdermal, Q8H, Cindy Gordon MD     ondansetron (ZOFRAN-ODT) ODT tab 4 mg, 4 mg, Oral, Q6H PRN, 4 mg at 10/15/20 0112 **OR** ondansetron (ZOFRAN) injection 4 mg, 4 mg, Intravenous, Q6H PRN, Cindy Gordon MD     pantoprazole (PROTONIX) EC tablet 40 mg, 40 mg, Oral, BID, Cindy Godron MD, 40 mg at 10/15/20 1002     polyethylene glycol (MIRALAX) Packet 17 g, 17 g, Oral, Daily PRN, Cindy Gordon MD     potassium chloride (KLOR-CON) Packet 20-40 mEq, 20-40 mEq, Oral or Feeding Tube, Q2H PRN, Cindy Gordon MD     potassium chloride 10 mEq in 100 mL intermittent infusion with 10 mg lidocaine, 10 mEq, Intravenous, Q1H PRN, Cindy Gordon MD     potassium chloride 10 mEq in 100 mL sterile water intermittent infusion (premix), 10 mEq, Intravenous, Q1H PRN, Cindy Gordon MD     potassium chloride 20 mEq in 50 mL intermittent infusion, 20 mEq, Intravenous, Q1H PRN, Cindy Gordon MD     potassium chloride ER (KLOR-CON M) CR tablet 20 mEq, 20 mEq, Oral, BID, Cindy Gordon MD, 20 mEq at 10/15/20 1003     potassium chloride ER (KLOR-CON M) CR tablet 20-40 mEq, 20-40 mEq, Oral, Q2H PRN, Cindy Gordon MD     potassium phosphate 15 mmol in D5W 250 mL intermittent infusion, 15 mmol, Intravenous, Daily PRN, Cindy Gordon MD     potassium phosphate 20 mmol in D5W 250 mL intermittent infusion, 20 mmol, Intravenous, Q6H PRN, Cindy Gordon MD     potassium phosphate 20 mmol in D5W 500 mL intermittent infusion, 20 mmol, Intravenous, Q6H PRN, Cindy Gordon MD     potassium phosphate 25 mmol in D5W 500 mL intermittent infusion, 25 mmol, Intravenous, Q8H PRN, Cindy Gordon MD     QUEtiapine (SEROquel) tablet 100 mg, 100 mg, Oral, BID PRN, Cindy Gordon MD, 100 mg at 10/15/20 1428     senna-docusate (SENOKOT-S/PERICOLACE) 8.6-50 MG per tablet 1 tablet, 1 tablet, Oral, BID PRN **OR** senna-docusate (SENOKOT-S/PERICOLACE) 8.6-50 MG per tablet 2 tablet, 2 tablet, Oral, BID PRN,  "Cindy Gordon MD     tamsulosin (FLOMAX) capsule 0.4 mg, 0.4 mg, Oral, Daily, Cindy Gordon MD, 0.4 mg at 10/15/20 1002     thiamine (B-1) 500 mg in sodium chloride 0.9 % 50 mL intermittent infusion, 500 mg, Intravenous, TID, Last Rate: 100 mL/hr at 10/15/20 1635, 500 mg at 10/15/20 1635 **FOLLOWED BY** [START ON 10/17/2020] thiamine (B-1) 250 mg in sodium chloride 0.9 % 50 mL intermittent infusion, 250 mg, Intravenous, Daily **FOLLOWED BY** [START ON 10/22/2020] thiamine (B-1) tablet 100 mg, 100 mg, Oral, Daily, Cindy Gordon MD     traZODone (DESYREL) tablet 100 mg, 100 mg, Oral, At Bedtime PRN, Cindy Gordon MD, 100 mg at 10/15/20 0003     vancomycin (VANCOCIN) capsule 125 mg, 125 mg, Oral, 4x Daily, Cindy Gordon MD, 125 mg at 10/15/20 1732     vortioxetine (TRINTELLIX) tablet 10 mg, 10 mg, Oral, Daily, Cindy Gordon MD, 10 mg at 10/15/20 1002     Social History:  Refer to the psychosocial assessment completed by the .  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  Has a living will and is DNR/DNI.  Wants his brother Jame contacted.  (last updated 9/29/2018)          Family History:    The patient denied a family history of mental illnesses or suicide attempts    Vital Signs:    B/P: 151/72, T: 97.9, P: 68, R: 20  Estimated body mass index is 27.93 kg/m  as calculated from the following:    Height as of 10/6/20: 1.702 m (5' 7\").    Weight as of 10/11/20: 80.9 kg (178 lb 5.6 oz).       Mental status examination:  Appearance:  Alert, slightly disheveled, no acute distress  Attitude:  Attempts to be cooperative  Eye Contact: Fair  Mood:  Depressed  Affect: Mood congruent and blunted  Speech:  Normal rates, tone, latency, volume. Not pushed or pressured.  Psychomotor Behavior: Moderately tremulous  Thought Process: Linear and logical; not tangential or circumstantial or disorganized  Associations:  Logical; no loose " associations noted  Thought Content:  No obvious paranoia, delusions, ideas of reference, or grandiosity noted. Denies auditory or visual hallucinations.  Endorsed suicidal Ideations. Denies homicidal ideations.  Insight: Limited  Judgment: Limited  Oriented to:  time, person, and place  Attention Span and Concentration:  Intact  Recent and Remote Memory: Intact based on interviewing and details provided  Language: Appropriate based on interviewing  Fund of Knowledge: Appropriate based on interviewing  Muscle Strength and Tone: Normal upon visual inspection       Diagnoses:    Alcohol use disorder, severe  Alcohol withdrawal  Major depressive disorder-recurrent, severe without psychotic features  Hepatic cirrhosis with ascites and esophageal varices  BPH  Chronic kidney disease  C. difficile colitis     Recommendations:  Continue CIWA protocol for management of alcohol withdrawal symptoms.    Restart Trintellix for mood and anxiety management.    A petition for commitment for MI/CD treatment has been submitted today as we aim to pursue residential treatment options given his inability to maintain sobriety outside of a structured setting while accumulating potentially lethal medical consequences related to his substance use disorder.    A chemical dependency consultation will be requested to assist the patient in exploring residential MI/CD treatment.    Continue the one-to-one sitter for now to mitigate his suicide risk.  Psychiatry will follow up tomorrow to determine if a one-to-one sitter is still needed for this reason.  If no longer needed, a door alarm may be considered to minimize his elopement risk.    If the patient attempts to leave the hospital, a 72-hour hold may be initiated.    Please reconsult with psychiatry as needed.   Jeff Brito MD   Text Page

## 2020-10-16 NOTE — PROGRESS NOTES
[facility]    Hospitalist Progress Note      Assessment & Plan   Jim Richard is a 65 year old male was admitted on 10/14/2020 with PMH use disorder, cirrhosis, depression, recent hospitalization for intentional overdose, multiple hospitalization for intoxication who is being admitted again on 10/14/2020, due to continued drinking and suicidal ideation, stating that he would take all of his medications at once.  Recently hospitalized from 10/6 - 10/11 for alcohol intoxication with metabolic acidosis and C. difficile.  After discharge he states he stopped taking all of his medications and has been drinking because he does not care whether he lives or dies.    Depression, anxiety, suicidal ideation   Acute alcohol intoxication  Severe alcohol use disorder - multiple recent admission for intoxication at risk of life-threateningillness/death  Patient has been through treatment multiple times in the past. He states he stating drinking again right after discharge. BAL 0.36 at admission.    - CIWA with ativan ordered.  - Psychiatry consulted. We may need to pursue commitment.   Multiple failed Chemical Dependency treatments in the past, discussed with SW, may need to consider State ordered commitment.  Will await Psychiatry opinion and Chemical Dependency consult.   -Continue PTA mirtazapine, trintellix and  Trazodone, Atarax as needed as well.      Alcoholic cirrhosis with history of esophageal varices and ascites - History of GI bleed.  Grade 1 esophageal varices with portal gastropathy. He c/o diffuse abdominal discomfort and has obvious fluid wave on exam with diffuse mild tenderness on exam. Last paracentesis on 10/9 was negative for SBP and patient currently refusing paracentesis stating pain no different now than prior to that procedure.   -Defer paracentesis until medically more stable and out of withdrawal window.  - Holding lasix and spironolactone in context of lactic acidosis.  Resume if remains hemodynamically stable  - continue PPI   - continue PTA potassium BID and magnesium   - high dose thiamine and multi-vitamins, folic acid ordered.   - continue PTA atenolol      Lactic acidosis secondary to above.  Hemodynamically stable with low suspicion for infection / sepsis  -DC IVF, taking in p.o.'s.       C. difficile colitis, diagnosed during his last admission CT A/P with circumferential wall thickening of rectum suggesting distal colitis/proctitis. procalcitonin low at 0.07. WBC 7.7. Afebrile.  Not taking vancomycin and currently having 2-3 loose stools a day on admission.  - oral vancomycin 125 mg every 6 hours with plan to continue to complete 10 more days as ordered at discharge.  Today formed stool.     Chronic kidney disease stage III  Cr 1.63, Baseline creatinine 1.4-1.9.      Chronic anemia, nl MCV, stable  - hgb 10.2      BPH  -Continue flomax     JANIS,   - home CPAP ordered        DVT Prophylaxis: Pneumatic Compression Devices  Code Status: Full Code  Expected discharge: TBD pending Psychiatry and Chemical Dependency plan established.    Rosario Schwab MD  Text Page (7am - 6pm, M-F)    Interval History   Nursing reports no acute behavioral issues, sitter 1: 1 present who reports no issues.  No ideation or marks of self-harm.  No tremor, anxiety, afebrile    Physical Exam   Temp: 98.7  F (37.1  C) Temp src: Oral BP: (!) 153/79 Pulse: 51   Resp: 18 SpO2: 95 % O2 Device: None (Room air)    There were no vitals filed for this visit.  Vital Signs with Ranges  Temp:  [98.4  F (36.9  C)-98.8  F (37.1  C)] 98.7  F (37.1  C)  Pulse:  [51-68] 51  Resp:  [18-20] 18  BP: (153-154)/(79-84) 153/79  SpO2:  [95 %-96 %] 95 %  I/O last 3 completed shifts:  In: 1337 [P.O.:220; I.V.:1117]  Out: -     General/Constitutional:  No acute distress, alert, calm, cooperative  HEENT/Head Exam:  atraumatic  Eyes:  PERRL, no conjunctivits  Mouth/Oral Pharynx:  Buccal mucosa WNL  Chest/Respiratory:  Air  exchange bilateral lung fields; no rales or wheeze. Respiration nonlabored.  Cardiovascular: No murmur appreciated.    Gastrointestinal/Abdomen:  soft, nontender, mild ascites  Musculoskeletal:  extremities warm, dry, noncyanotic; no acitve synovitis.  Neurologic: No flap.  Gross CN and motor testing  nonfocal.  Sensation grossly tested intact.  Psychiatric:  Mental status:  Alert, oriented, affect calm, no ideation or remarks of self-harm.  Skin:  No rash noted on gross exam    Medications     dextrose 5% and 0.9% NaCl with potassium chloride 20 mEq 100 mL/hr at 10/16/20 0758       atenolol  25 mg Oral Daily     folic acid  1 mg Oral Daily     [START ON 10/17/2020] influenza vac high-dose quad  0.7 mL Intramuscular Prior to discharge     [Held by provider] magnesium oxide  400 mg Oral Daily     mirtazapine  30 mg Oral At Bedtime     multivitamin w/minerals  1 tablet Oral Daily     nicotine  1 patch Transdermal Daily     nicotine   Transdermal Q8H     pantoprazole  40 mg Oral BID     potassium chloride ER  20 mEq Oral BID     tamsulosin  0.4 mg Oral Daily     thiamine  500 mg Intravenous TID    Followed by     [START ON 10/17/2020] thiamine  250 mg Intravenous Daily    Followed by     [START ON 10/22/2020] thiamine  100 mg Oral Daily     vancomycin  125 mg Oral 4x Daily     vortioxetine  10 mg Oral Daily       Data   Recent Labs   Lab 10/15/20  0952 10/14/20  1530 10/10/20  0843   WBC 3.4* 5.5 5.4   HGB 9.5* 10.2* 9.1*   MCV 91 91 93    271 54*    142 132*   POTASSIUM 4.1 3.9 4.0   CHLORIDE 109 110* 106   CO2 23 22 20   BUN 24 27 20   CR 1.71* 1.63* 1.73*   ANIONGAP 6 10 6   KEV 8.2* 8.2* 7.8*   * 76 119*   ALBUMIN 2.9* 3.0* 2.5*   PROTTOTAL 6.9 7.4  --    BILITOTAL 0.8 0.6  --    ALKPHOS 165* 177*  --    ALT 18 20  --    AST 33 36  --    LIPASE  --  236  --

## 2020-10-17 LAB
ANION GAP SERPL CALCULATED.3IONS-SCNC: 5 MMOL/L (ref 3–14)
BUN SERPL-MCNC: 15 MG/DL (ref 7–30)
CALCIUM SERPL-MCNC: 8.1 MG/DL (ref 8.5–10.1)
CHLORIDE SERPL-SCNC: 111 MMOL/L (ref 94–109)
CO2 SERPL-SCNC: 21 MMOL/L (ref 20–32)
CREAT SERPL-MCNC: 1.64 MG/DL (ref 0.66–1.25)
GFR SERPL CREATININE-BSD FRML MDRD: 43 ML/MIN/{1.73_M2}
GLUCOSE SERPL-MCNC: 123 MG/DL (ref 70–99)
POTASSIUM SERPL-SCNC: 5.4 MMOL/L (ref 3.4–5.3)
SODIUM SERPL-SCNC: 137 MMOL/L (ref 133–144)

## 2020-10-17 PROCEDURE — 99232 SBSQ HOSP IP/OBS MODERATE 35: CPT | Performed by: PSYCHIATRY & NEUROLOGY

## 2020-10-17 PROCEDURE — 99232 SBSQ HOSP IP/OBS MODERATE 35: CPT | Performed by: STUDENT IN AN ORGANIZED HEALTH CARE EDUCATION/TRAINING PROGRAM

## 2020-10-17 PROCEDURE — 258N000003 HC RX IP 258 OP 636: Performed by: INTERNAL MEDICINE

## 2020-10-17 PROCEDURE — 250N000013 HC RX MED GY IP 250 OP 250 PS 637: Performed by: STUDENT IN AN ORGANIZED HEALTH CARE EDUCATION/TRAINING PROGRAM

## 2020-10-17 PROCEDURE — 36415 COLL VENOUS BLD VENIPUNCTURE: CPT | Performed by: HOSPITALIST

## 2020-10-17 PROCEDURE — 120N000001 HC R&B MED SURG/OB

## 2020-10-17 PROCEDURE — 250N000013 HC RX MED GY IP 250 OP 250 PS 637: Performed by: INTERNAL MEDICINE

## 2020-10-17 PROCEDURE — 80048 BASIC METABOLIC PNL TOTAL CA: CPT | Performed by: HOSPITALIST

## 2020-10-17 PROCEDURE — 250N000011 HC RX IP 250 OP 636: Performed by: HOSPITALIST

## 2020-10-17 PROCEDURE — 250N000011 HC RX IP 250 OP 636: Performed by: INTERNAL MEDICINE

## 2020-10-17 PROCEDURE — 258N000001 HC RX 258: Performed by: INTERNAL MEDICINE

## 2020-10-17 PROCEDURE — G0008 ADMIN INFLUENZA VIRUS VAC: HCPCS | Performed by: HOSPITALIST

## 2020-10-17 PROCEDURE — 90662 IIV NO PRSV INCREASED AG IM: CPT | Performed by: HOSPITALIST

## 2020-10-17 RX ORDER — OXYCODONE HYDROCHLORIDE 5 MG/1
5 TABLET ORAL EVERY 6 HOURS PRN
Status: DISCONTINUED | OUTPATIENT
Start: 2020-10-17 | End: 2020-10-26

## 2020-10-17 RX ORDER — LIDOCAINE 4 G/G
1 PATCH TOPICAL
Status: DISCONTINUED | OUTPATIENT
Start: 2020-10-17 | End: 2020-11-18 | Stop reason: HOSPADM

## 2020-10-17 RX ADMIN — VANCOMYCIN HYDROCHLORIDE 125 MG: 125 CAPSULE ORAL at 09:38

## 2020-10-17 RX ADMIN — LORAZEPAM 1 MG: 1 TABLET ORAL at 05:01

## 2020-10-17 RX ADMIN — INFLUENZA A VIRUS A/MICHIGAN/45/2015 X-275 (H1N1) ANTIGEN (FORMALDEHYDE INACTIVATED), INFLUENZA A VIRUS A/SINGAPORE/INFIMH-16-0019/2016 IVR-186 (H3N2) ANTIGEN (FORMALDEHYDE INACTIVATED), INFLUENZA B VIRUS B/PHUKET/3073/2013 ANTIGEN (FORMALDEHYDE INACTIVATED), AND INFLUENZA B VIRUS B/MARYLAND/15/2016 BX-69A ANTIGEN (FORMALDEHYDE INACTIVATED) 0.7 ML: 60; 60; 60; 60 INJECTION, SUSPENSION INTRAMUSCULAR at 10:15

## 2020-10-17 RX ADMIN — PANTOPRAZOLE SODIUM 40 MG: 40 TABLET, DELAYED RELEASE ORAL at 20:07

## 2020-10-17 RX ADMIN — QUETIAPINE FUMARATE 100 MG: 100 TABLET ORAL at 12:53

## 2020-10-17 RX ADMIN — SODIUM CHLORIDE 500 ML: 9 INJECTION, SOLUTION INTRAVENOUS at 20:07

## 2020-10-17 RX ADMIN — MIRTAZAPINE 30 MG: 15 TABLET, FILM COATED ORAL at 22:12

## 2020-10-17 RX ADMIN — HYDROMORPHONE HYDROCHLORIDE 0.3 MG: 1 INJECTION, SOLUTION INTRAMUSCULAR; INTRAVENOUS; SUBCUTANEOUS at 05:49

## 2020-10-17 RX ADMIN — VORTIOXETINE 10 MG: 10 TABLET, FILM COATED ORAL at 09:38

## 2020-10-17 RX ADMIN — POTASSIUM CHLORIDE, DEXTROSE MONOHYDRATE AND SODIUM CHLORIDE: 150; 5; 900 INJECTION, SOLUTION INTRAVENOUS at 06:18

## 2020-10-17 RX ADMIN — QUETIAPINE FUMARATE 100 MG: 100 TABLET ORAL at 22:15

## 2020-10-17 RX ADMIN — OXYCODONE HYDROCHLORIDE 5 MG: 5 TABLET ORAL at 22:12

## 2020-10-17 RX ADMIN — NICOTINE 1 PATCH: 21 PATCH, EXTENDED RELEASE TRANSDERMAL at 09:37

## 2020-10-17 RX ADMIN — MULTIPLE VITAMINS W/ MINERALS TAB 1 TABLET: TAB at 09:38

## 2020-10-17 RX ADMIN — PANTOPRAZOLE SODIUM 40 MG: 40 TABLET, DELAYED RELEASE ORAL at 09:38

## 2020-10-17 RX ADMIN — LIDOCAINE 1 PATCH: 560 PATCH PERCUTANEOUS; TOPICAL; TRANSDERMAL at 20:07

## 2020-10-17 RX ADMIN — HYDROMORPHONE HYDROCHLORIDE 0.3 MG: 1 INJECTION, SOLUTION INTRAMUSCULAR; INTRAVENOUS; SUBCUTANEOUS at 01:31

## 2020-10-17 RX ADMIN — ATENOLOL 25 MG: 25 TABLET ORAL at 09:38

## 2020-10-17 RX ADMIN — VANCOMYCIN HYDROCHLORIDE 125 MG: 125 CAPSULE ORAL at 12:53

## 2020-10-17 RX ADMIN — VANCOMYCIN HYDROCHLORIDE 125 MG: 125 CAPSULE ORAL at 22:12

## 2020-10-17 RX ADMIN — HYDROXYZINE HYDROCHLORIDE 50 MG: 25 TABLET, FILM COATED ORAL at 10:28

## 2020-10-17 RX ADMIN — OXYCODONE HYDROCHLORIDE 5 MG: 5 TABLET ORAL at 16:29

## 2020-10-17 RX ADMIN — FOLIC ACID 1 MG: 1 TABLET ORAL at 09:38

## 2020-10-17 RX ADMIN — TAMSULOSIN HYDROCHLORIDE 0.4 MG: 0.4 CAPSULE ORAL at 09:38

## 2020-10-17 RX ADMIN — VANCOMYCIN HYDROCHLORIDE 125 MG: 125 CAPSULE ORAL at 17:18

## 2020-10-17 RX ADMIN — HYDROMORPHONE HYDROCHLORIDE 0.3 MG: 1 INJECTION, SOLUTION INTRAMUSCULAR; INTRAVENOUS; SUBCUTANEOUS at 10:27

## 2020-10-17 RX ADMIN — THIAMINE HYDROCHLORIDE 250 MG: 100 INJECTION, SOLUTION INTRAMUSCULAR; INTRAVENOUS at 09:38

## 2020-10-17 RX ADMIN — FLUTICASONE FUROATE AND VILANTEROL TRIFENATATE 1 PUFF: 200; 25 POWDER RESPIRATORY (INHALATION) at 22:12

## 2020-10-17 RX ADMIN — TRAZODONE HYDROCHLORIDE 100 MG: 100 TABLET ORAL at 00:27

## 2020-10-17 RX ADMIN — HYDROXYZINE HYDROCHLORIDE 50 MG: 25 TABLET, FILM COATED ORAL at 01:30

## 2020-10-17 RX ADMIN — POTASSIUM CHLORIDE 20 MEQ: 1500 TABLET, EXTENDED RELEASE ORAL at 09:38

## 2020-10-17 ASSESSMENT — ACTIVITIES OF DAILY LIVING (ADL)
ADLS_ACUITY_SCORE: 13

## 2020-10-17 NOTE — PROGRESS NOTES
Current condition (current mood & behavior): pleasant and cooperative, pleased that pain has reduced with IV dilaudid to 5/10 pain  Sitter present: yes  Every 15 minute documentation by NA/RN completed for Shift: yes  Room safety Suicide Checklist completed in Epic: yes  Patient's color of severity (suicide scale): YELLOW  Order for psych consult placed (if appropriate): yes  Suicide care plan added: yes      Dia Gunn RN

## 2020-10-17 NOTE — PLAN OF CARE
DATE & TIME: 10/17/2020 5638-6857   Cognitive Concerns/ Orientation : A&Ox4   BEHAVIOR & AGGRESSION TOOL COLOR: green, pleasant and cooperative  CIWA SCORE: 4, 3, 2   ABNL VS/O2: BP 150s, other VSS on RA  MOBILITY: Up with SBA   PAIN MANAGMENT: c/o abd pain, dilaudid given x1  DIET: 2g Na, tolerating well, good appetite  BOWEL/BLADDER: continent   ABNL LAB/BG: pending  DRAIN/DEVICES: PIV infusing   SKIN: bruising, skin tears --mepilex CDI  D/C DAY/GOALS/PLACE: pending clinical improvement   OTHER IMPORTANT INFO: LS clear, Psych/CD following. Patient denies suicidal ideation and thoughts of self-harm this shift. Reports anxiety, atarax & seroquel given x1 with relief.     Current condition (current mood & behavior): calm, cooperative, pleasant  Sitter present: yes  Every 15 minute documentation by NA/RN completed for Shift: yes  Room safety Suicide Checklist completed in Epic: yes  Patient's color of severity (suicide scale): Red on admission  Order for psych consult placed (if appropriate): yes-   Suicide care plan added: yes  Suicide precautions discontinued by psych at 1040

## 2020-10-17 NOTE — PROGRESS NOTES
[facility]    Hospitalist Progress Note    Date of Service: 10/17/2020    Assessment & Plan      Jim Richard is a 65 year old male was admitted on 10/14/2020 with PMH use disorder, cirrhosis, depression, recent hospitalization for intentional overdose, multiple hospitalization for intoxication who is being admitted again on 10/14/2020, due to continued drinking and suicidal ideation, stating that he would take all of his medications at once.  Recently hospitalized from 10/6 - 10/11 for alcohol intoxication with metabolic acidosis and C. difficile.  After discharge he states he stopped taking all of his medications and has been drinking because he does not care whether he lives or dies.    Depression, anxiety, suicidal ideation   Acute alcohol intoxication  Severe alcohol use disorder - multiple recent admission for intoxication at risk of life-threateningillness/death  Patient has been through treatment multiple times in the past. He states he stating drinking again right after discharge. BAL 0.36 at admission.    - CIWA with ativan ordered.  - Psychiatry following. We may need to pursue commitment.   Multiple failed Chemical Dependency treatments in the past, discussed with SW, may need to consider State ordered commitment.  Will await Psychiatry opinion and Chemical Dependency consult.   -Continue PTA mirtazapine, trintellix and  Trazodone, Atarax as needed as well.      Alcoholic cirrhosis with history of esophageal varices and ascites - History of GI bleed.  Grade 1 esophageal varices with portal gastropathy. He c/o diffuse abdominal discomfort and has obvious fluid wave on exam with diffuse mild tenderness on exam. Last paracentesis on 10/9 was negative for SBP and patient currently refusing paracentesis stating pain no different now than prior to that procedure.   -Defer paracentesis until medically more stable and out of withdrawal window.  - Holding lasix and spironolactone  in context of lactic acidosis. Resume if remains hemodynamically stable  - continue PPI   - continue PTA potassium BID and magnesium   - high dose thiamine and multi-vitamins, folic acid ordered.   - continue PTA atenolol      Lactic acidosis secondary to above.  Hemodynamically stable with low suspicion for infection / sepsis  -DC IVF, taking in p.o.'s.       C. difficile colitis, diagnosed during his last admission CT A/P with circumferential wall thickening of rectum suggesting distal colitis/proctitis. procalcitonin low at 0.07. WBC 7.7. Afebrile.  Not taking vancomycin and currently having 2-3 loose stools a day on admission.  - oral vancomycin 125 mg every 6 hours with plan to continue to complete 10 more days as ordered at discharge.  Today formed stool.     Chronic kidney disease stage III  Cr 1.63, Baseline creatinine 1.4-1.9.      Chronic anemia, nl MCV, stable  - hgb 10.2      BPH  -Continue flomax     JANIS,   - home CPAP ordered        DVT Prophylaxis: Pneumatic Compression Devices  Code Status: Full Code  Expected discharge: TBD pending Psychiatry and Chemical Dependency plan established.    Chivo Murcia MD  Text Page (7am - 6pm, M-F)    Interval History      Main complaint is low back discomfort.  No fevers or chills  Today patient has no ideation or marks of self-harm.  No tremors, anxiety, afebrile    Physical Exam   Temp: 98.7  F (37.1  C) Temp src: Oral BP: (!) 153/84 Pulse: 59   Resp: 16 SpO2: 96 % O2 Device: None (Room air)    There were no vitals filed for this visit.  Vital Signs with Ranges  Temp:  [98.6  F (37  C)-98.7  F (37.1  C)] 98.7  F (37.1  C)  Pulse:  [51-67] 59  Resp:  [16-20] 16  BP: (115-171)/(76-90) 153/84  SpO2:  [94 %-96 %] 96 %  I/O last 3 completed shifts:  In: 540 [P.O.:540]  Out: -     General/Constitutional:  No acute distress, alert, calm, cooperative  Chest/Respiratory:  Air exchange bilateral lung fields; no rales or wheeze. Respiration nonlabored.  Cardiovascular: No  murmur appreciated.    Gastrointestinal/Abdomen:  soft, nontender, mild ascites  Musculoskeletal:  extremities warm, dry, noncyanotic; no acitve synovitis.  Neurologic: No flap.  Gross CN and motor testing  nonfocal.  Sensation grossly tested intact.  Psychiatric:  Mental status:  Alert, oriented, affect calm, no ideation or remarks of self-harm.  Skin:  No rash noted on gross exam    Medications     dextrose 5% and 0.9% NaCl with potassium chloride 20 mEq 100 mL/hr at 10/17/20 0618       atenolol  25 mg Oral Daily     folic acid  1 mg Oral Daily     [Held by provider] magnesium oxide  400 mg Oral Daily     mirtazapine  30 mg Oral At Bedtime     multivitamin w/minerals  1 tablet Oral Daily     nicotine  1 patch Transdermal Daily     nicotine   Transdermal Q8H     pantoprazole  40 mg Oral BID     potassium chloride ER  20 mEq Oral BID     tamsulosin  0.4 mg Oral Daily     thiamine  250 mg Intravenous Daily    Followed by     [START ON 10/22/2020] thiamine  100 mg Oral Daily     vancomycin  125 mg Oral 4x Daily     vortioxetine  10 mg Oral Daily       Data   Recent Labs   Lab 10/17/20  1402 10/15/20  0952 10/14/20  1530   WBC  --  3.4* 5.5   HGB  --  9.5* 10.2*   MCV  --  91 91   PLT  --  191 271    138 142   POTASSIUM 5.4* 4.1 3.9   CHLORIDE 111* 109 110*   CO2 21 23 22   BUN 15 24 27   CR 1.64* 1.71* 1.63*   ANIONGAP 5 6 10   KEV 8.1* 8.2* 8.2*   * 114* 76   ALBUMIN  --  2.9* 3.0*   PROTTOTAL  --  6.9 7.4   BILITOTAL  --  0.8 0.6   ALKPHOS  --  165* 177*   ALT  --  18 20   AST  --  33 36   LIPASE  --   --  236

## 2020-10-17 NOTE — CONSULTS
[facility] Psychiatric Progress Note      Interval History:   Pt seen on station 66.  Has a sitter by bedside.  Patient of Dr Rand.  Admits to depression and has been self medicating with alcohol.  More depressed recently due to his medical issues.  He is on trintellix for his depression.  He has a sitter for 2 days but does not report any active suicidal ideation and therefore does not warrant having a sitter.  I will cancel it/     Review of systems:   The Review of Systems is negative other than noted in the HPI     Medications:     Current Facility-Administered Medications   Medication     acetaminophen (TYLENOL) tablet 650 mg     atenolol (TENORMIN) tablet 25 mg     bisacodyl (DULCOLAX) Suppository 10 mg     dextrose 5% and 0.9% NaCl with potassium chloride 20 mEq infusion     fluticasone-vilanterol (BREO ELLIPTA) 200-25 MCG/INH inhaler 1 puff     folic acid (FOLVITE) tablet 1 mg     HYDROmorphone (PF) (DILAUDID) injection 0.3 mg     hydrOXYzine (ATARAX) tablet  mg     LORazepam (ATIVAN) tablet 1-2 mg    Or     LORazepam (ATIVAN) injection 1-2 mg     [Held by provider] magnesium oxide (MAG-OX) tablet 400 mg     magnesium sulfate 4 g in 100 mL sterile water (premade)     melatonin tablet 1 mg     methocarbamol (ROBAXIN) tablet 500 mg     mirtazapine (REMERON) tablet 30 mg     multivitamin w/minerals (THERA-VIT-M) tablet 1 tablet     naloxone (NARCAN) injection 0.1-0.4 mg     nicotine (COMMIT) lozenge 2 mg     nicotine (NICODERM CQ) 21 MG/24HR 24 hr patch 1 patch     nicotine Patch in Place     ondansetron (ZOFRAN-ODT) ODT tab 4 mg    Or     ondansetron (ZOFRAN) injection 4 mg     pantoprazole (PROTONIX) EC tablet 40 mg     polyethylene glycol (MIRALAX) Packet 17 g     potassium chloride (KLOR-CON) Packet 20-40 mEq     potassium chloride 10 mEq in 100 mL intermittent infusion with 10 mg lidocaine     potassium chloride 10 mEq in 100 mL sterile water intermittent infusion (premix)      potassium chloride 20 mEq in 50 mL intermittent infusion     potassium chloride ER (KLOR-CON M) CR tablet 20 mEq     potassium chloride ER (KLOR-CON M) CR tablet 20-40 mEq     potassium phosphate 15 mmol in D5W 250 mL intermittent infusion     potassium phosphate 20 mmol in D5W 250 mL intermittent infusion     potassium phosphate 20 mmol in D5W 500 mL intermittent infusion     potassium phosphate 25 mmol in D5W 500 mL intermittent infusion     QUEtiapine (SEROquel) tablet 100 mg     senna-docusate (SENOKOT-S/PERICOLACE) 8.6-50 MG per tablet 1 tablet    Or     senna-docusate (SENOKOT-S/PERICOLACE) 8.6-50 MG per tablet 2 tablet     tamsulosin (FLOMAX) capsule 0.4 mg     thiamine (B-1) 250 mg in sodium chloride 0.9 % 50 mL intermittent infusion    Followed by     [START ON 10/22/2020] thiamine (B-1) tablet 100 mg     traZODone (DESYREL) tablet 100 mg     vancomycin (VANCOCIN) capsule 125 mg     vortioxetine (TRINTELLIX) tablet 10 mg         Mental Status Examination:     Appearance:  awake, alert  Eye Contact:  fair  Speech:  clear, coherent  Psychomotor Behavior:  no evidence of tardive dyskinesia, dystonia, or tics  Mood:  depressed  Affect:  intensity is blunted  Thought Process:  logical no loose associations  Thought Content:  no evidence of suicidal ideation or homicidal ideation  Oriented to:  time, person, and place  Attention Span and Concentration:  intact  Recent and Remote Memory:  intact  Fund of Knowledge: appropriate  Muscle Strength and Tone: normal  Gait and Station: Normal  Insight:  partial  Judgment:  fair        Labs:   No results found for this or any previous visit (from the past 24 hour(s)).      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 Written information given on medications. Side  effects, risks, benefits reviewed.  2. Continue trintellix for depression   3. Cancel the sitter as patient is not actively suicidal  4. Call psychiatry for follow up as needed      Attestation:  Patient has been seen and evaluated by me,  Hemal Archibald MD  Total amount of time: 30 minutes

## 2020-10-17 NOTE — PLAN OF CARE
DATE & TIME: 10/16-17/2020 8316-0730    Cognitive Concerns/ Orientation : A&OX4   BEHAVIOR & AGGRESSION TOOL COLOR: green, pleasant and cooperative. Sitter at bedside.  CIWA SCORE: 5, 8, & 4 this shift, due to tremors/anxiety, PO ativan x1 per CIWA protocol   ABNL VS/O2: BP 150s, other VSS on RA  MOBILITY: SBA   PAIN MANAGMENT: PRN dilaudid x2 given for abdominal pain & Atarax given for anxiety.  DIET: 2g Na, tolerating well, denies n/v  BOWEL/BLADDER: continent   ABNL LAB/BG: BARNEY on admission 0.36, WBC 3.4, Hgb 9.5  DRAIN/DEVICES: PIV infusing D5NS+20KCl 100mL/hr need to order from pharmacy  SKIN: bruising, R arm skin tear--mepilex CDI  D/C DAY/GOALS/PLACE: pending clinical improvement   OTHER IMPORTANT INFO: LS clear, Psych/CD following. Patient denies suicidal ideation and thoughts of self-harm this shift.     Current condition (current mood & behavior): calm, cooperative, pleasant  Sitter present: yes  Every 15 minute documentation by NA/RN completed for Shift: yes  Room safety Suicide Checklist completed in Epic: yes  Patient's color of severity (suicide scale): NA Patient denies suicidal ideation and thoughts of self-harm this shift.   Order for psych consult placed (if appropriate): yes- completed 10/16- given atarax & Seroquel x1   Suicide care plan added: yes    Zamzam Benjamin RN

## 2020-10-17 NOTE — PLAN OF CARE
DATE & TIME: 10/16/2020 8254-7069    Cognitive Concerns/ Orientation : A&OX4   BEHAVIOR & AGGRESSION TOOL COLOR: green, pleasant and cooperative. Sitter at bedside.  CIWA SCORE: 5 and 5 this shift    ABNL VS/O2: /79, other VSS on RA  MOBILITY: SBA, ambulated in muniz x1 this shift. Steady on feet.   PAIN MANAGMENT: PRN dilaudid given Atarax & Seroquel given for anxiety.  DIET: 2g Na, tolerating well, denies n/v  BOWEL/BLADDER: continent   ABNL LAB/BG:   DRAIN/DEVICES: PIV infusing D5NS+20KCl 100mL/hr need to order from pharmacy  SKIN: bruising, R arm skin tear--mepilex CDI  D/C DAY/GOALS/PLACE: pending clinical improvement   OTHER IMPORTANT INFO: LS clear, Psych consult pending. Patient denies suicidal ideation and thoughts of self-harm this shift.     Current condition (current mood & behavior): calm, cooperative, pleasant  Sitter present: yes  Every 15 minute documentation by NA/RN completed for Shift: yes  Room safety Suicide Checklist completed in Epic: yes  Patient's color of severity (suicide scale): NA Patient denies suicidal ideation and thoughts of self-harm this shift.   Order for psych consult placed (if appropriate): yes- completed 10/16- given atarax & Seroquel x1   Suicide care plan added: yes

## 2020-10-17 NOTE — PROVIDER NOTIFICATION
MD Notification    Notified Person: MD    Notified Person Name:  MD Chinchilla    Notification Date/Time: 10/16/2020 2010    Notification Interaction: Via Webpage    Purpose of Notification: Pt refused tylenol continue to have abdominal pain. Can he have bentyl.    Orders Received: Dilaudid    Comments:

## 2020-10-18 LAB
ALBUMIN SERPL-MCNC: 2.5 G/DL (ref 3.4–5)
POTASSIUM SERPL-SCNC: 5.3 MMOL/L (ref 3.4–5.3)

## 2020-10-18 PROCEDURE — 250N000011 HC RX IP 250 OP 636: Performed by: INTERNAL MEDICINE

## 2020-10-18 PROCEDURE — 120N000001 HC R&B MED SURG/OB

## 2020-10-18 PROCEDURE — 250N000013 HC RX MED GY IP 250 OP 250 PS 637: Performed by: INTERNAL MEDICINE

## 2020-10-18 PROCEDURE — 258N000003 HC RX IP 258 OP 636: Performed by: INTERNAL MEDICINE

## 2020-10-18 PROCEDURE — 84132 ASSAY OF SERUM POTASSIUM: CPT | Performed by: INTERNAL MEDICINE

## 2020-10-18 PROCEDURE — 250N000013 HC RX MED GY IP 250 OP 250 PS 637: Performed by: STUDENT IN AN ORGANIZED HEALTH CARE EDUCATION/TRAINING PROGRAM

## 2020-10-18 PROCEDURE — 82040 ASSAY OF SERUM ALBUMIN: CPT | Performed by: STUDENT IN AN ORGANIZED HEALTH CARE EDUCATION/TRAINING PROGRAM

## 2020-10-18 PROCEDURE — 36415 COLL VENOUS BLD VENIPUNCTURE: CPT | Performed by: STUDENT IN AN ORGANIZED HEALTH CARE EDUCATION/TRAINING PROGRAM

## 2020-10-18 PROCEDURE — 99232 SBSQ HOSP IP/OBS MODERATE 35: CPT | Performed by: STUDENT IN AN ORGANIZED HEALTH CARE EDUCATION/TRAINING PROGRAM

## 2020-10-18 PROCEDURE — 36415 COLL VENOUS BLD VENIPUNCTURE: CPT | Performed by: INTERNAL MEDICINE

## 2020-10-18 RX ADMIN — OXYCODONE HYDROCHLORIDE 5 MG: 5 TABLET ORAL at 10:37

## 2020-10-18 RX ADMIN — VANCOMYCIN HYDROCHLORIDE 125 MG: 125 CAPSULE ORAL at 08:58

## 2020-10-18 RX ADMIN — MULTIPLE VITAMINS W/ MINERALS TAB 1 TABLET: TAB at 08:58

## 2020-10-18 RX ADMIN — FLUTICASONE FUROATE AND VILANTEROL TRIFENATATE 1 PUFF: 200; 25 POWDER RESPIRATORY (INHALATION) at 11:18

## 2020-10-18 RX ADMIN — OXYCODONE HYDROCHLORIDE 5 MG: 5 TABLET ORAL at 16:49

## 2020-10-18 RX ADMIN — NICOTINE 1 PATCH: 21 PATCH, EXTENDED RELEASE TRANSDERMAL at 09:05

## 2020-10-18 RX ADMIN — VANCOMYCIN HYDROCHLORIDE 125 MG: 125 CAPSULE ORAL at 12:51

## 2020-10-18 RX ADMIN — QUETIAPINE FUMARATE 100 MG: 100 TABLET ORAL at 21:33

## 2020-10-18 RX ADMIN — MIRTAZAPINE 30 MG: 15 TABLET, FILM COATED ORAL at 21:33

## 2020-10-18 RX ADMIN — FOLIC ACID 1 MG: 1 TABLET ORAL at 08:59

## 2020-10-18 RX ADMIN — VORTIOXETINE 10 MG: 10 TABLET, FILM COATED ORAL at 08:59

## 2020-10-18 RX ADMIN — OXYCODONE HYDROCHLORIDE 5 MG: 5 TABLET ORAL at 04:19

## 2020-10-18 RX ADMIN — THIAMINE HYDROCHLORIDE 250 MG: 100 INJECTION, SOLUTION INTRAMUSCULAR; INTRAVENOUS at 08:59

## 2020-10-18 RX ADMIN — HYDROXYZINE HYDROCHLORIDE 100 MG: 25 TABLET, FILM COATED ORAL at 16:18

## 2020-10-18 RX ADMIN — QUETIAPINE FUMARATE 100 MG: 100 TABLET ORAL at 12:51

## 2020-10-18 RX ADMIN — ATENOLOL 25 MG: 25 TABLET ORAL at 08:59

## 2020-10-18 RX ADMIN — HYDROXYZINE HYDROCHLORIDE 50 MG: 25 TABLET, FILM COATED ORAL at 00:57

## 2020-10-18 RX ADMIN — VANCOMYCIN HYDROCHLORIDE 125 MG: 125 CAPSULE ORAL at 18:31

## 2020-10-18 RX ADMIN — LIDOCAINE 1 PATCH: 560 PATCH PERCUTANEOUS; TOPICAL; TRANSDERMAL at 20:47

## 2020-10-18 RX ADMIN — OXYCODONE HYDROCHLORIDE 5 MG: 5 TABLET ORAL at 22:47

## 2020-10-18 RX ADMIN — PANTOPRAZOLE SODIUM 40 MG: 40 TABLET, DELAYED RELEASE ORAL at 20:47

## 2020-10-18 RX ADMIN — PANTOPRAZOLE SODIUM 40 MG: 40 TABLET, DELAYED RELEASE ORAL at 08:59

## 2020-10-18 RX ADMIN — VANCOMYCIN HYDROCHLORIDE 125 MG: 125 CAPSULE ORAL at 21:33

## 2020-10-18 RX ADMIN — TAMSULOSIN HYDROCHLORIDE 0.4 MG: 0.4 CAPSULE ORAL at 08:59

## 2020-10-18 RX ADMIN — HYDROXYZINE HYDROCHLORIDE 100 MG: 25 TABLET, FILM COATED ORAL at 08:58

## 2020-10-18 ASSESSMENT — ACTIVITIES OF DAILY LIVING (ADL)
ADLS_ACUITY_SCORE: 13

## 2020-10-18 NOTE — PROVIDER NOTIFICATION
MD Notification    Notified Person: MD    Notified Person Name: Renée    Notification Date/Time: 10/17/20 7:30 PM    Notification Interaction: Text Page    Purpose of Notification: Do we want to continue current IV fluids Potassium 5.4. Pt currently on D5, NS, +20k.     Orders Received: Waiting for response    Comments:

## 2020-10-18 NOTE — PLAN OF CARE
Cognitive Concerns/ Orientation : A&Ox4   BEHAVIOR & AGGRESSION TOOL COLOR: green, pleasant and cooperative  CIWA SCORE: 3 2  ABNL VS/O2: VSS on RA  MOBILITY: Up with SBA   PAIN MANAGMENT: c/o abd pain, oxycodone given x1  DIET: 2g Na  BOWEL/BLADDER: continent  ABNL LAB/BG: Potassium: 5.3  DRAIN/DEVICES: PIV Saline Locked   SKIN: bruising, skin tears --mepilex CDI  D/C DAY/GOALS/PLACE: pending clinical improvement   OTHER IMPORTANT INFO: LS clear, Psych/CD following. Patient denies suicidal ideation and thoughts of self-harm this shift.

## 2020-10-18 NOTE — PROGRESS NOTES
[facility] [place]    Hospitalist Progress Note    Date of Service: 10/18/2020    Assessment & Plan      Jim Richard is a 65 year old male was admitted on 10/14/2020 with PMH use disorder, cirrhosis, depression, recent hospitalization for intentional overdose, multiple hospitalization for intoxication who is being admitted again on 10/14/2020, due to continued drinking and suicidal ideation, stating that he would take all of his medications at once.  Recently hospitalized from 10/6 - 10/11 for alcohol intoxication with metabolic acidosis and C. difficile.  After discharge he states he stopped taking all of his medications and has been drinking because he does not care whether he lives or dies.    Depression, anxiety, suicidal ideation   Acute alcohol intoxication  Severe alcohol use disorder - multiple recent admission for intoxication at risk of life-threateningillness/death  Patient has been through treatment multiple times in the past. He states he stating drinking again right after discharge. BAL 0.36 at admission.    - CIWA stopped  - Psychiatry following. Appears there is no need to pursue commitment.   Multiple failed Chemical Dependency treatments in the past, discussed with SW, may need to consider State ordered commitment.    -Continue PTA mirtazapine, trintellix, seroquel and  Trazodone, Atarax as needed as well.      Alcoholic cirrhosis with history of esophageal varices and ascites - History of GI bleed.  Grade 1 esophageal varices with portal gastropathy. He c/o diffuse abdominal discomfort and has obvious fluid wave on exam with diffuse mild tenderness on exam. Last paracentesis on 10/9 was negative for SBP and patient currently refusing paracentesis stating pain no different now than prior to that procedure.   - Holding lasix and spironolactone in context of lactic acidosis. Resume tomorrow post paracentesis if remains hemodynamically stable  - continue PPI   - continue PTA  potassium BID and magnesium   - high dose thiamine and multi-vitamins, folic acid ordered.   - continue PTA atenolol   - Paracentesis in AM     Lactic acidosis secondary to above.  Hemodynamically stable with low suspicion for infection / sepsis  -DC IVF, taking in p.o.'s.    C. difficile colitis, diagnosed during his last admission CT A/P with circumferential wall thickening of rectum suggesting distal colitis/proctitis. procalcitonin low at 0.07. WBC 7.7. Afebrile.  Not taking vancomycin and currently having 2-3 loose stools a day on admission.  - oral vancomycin 125 mg every 6 hours with plan to continue to complete 10 more days as ordered at discharge.  Today formed stool.     Chronic kidney disease stage III  Cr 1.63, Baseline creatinine 1.4-1.9.      Chronic anemia, nl MCV, stable  - hgb 10.2      BPH  -Continue flomax     JANIS,   - home CPAP ordered        DVT Prophylaxis: Pneumatic Compression Devices  Code Status: Full Code  Expected discharge: TBD pending Psychiatry and Chemical Dependency plan established.    Chivo Murcia MD  Text Page (7am - 6pm, M-F)    Interval History      Main complaint is distended abdomen today, feels he is due for paracentesis. No CP. Breathing more shallow due to abdominal distension.   No fevers or chills  Today patient has no ideation or marks of self-harm.  No tremors, anxiety, afebrile    Physical Exam   Temp: 98.7  F (37.1  C) Temp src: Oral BP: 138/78 Pulse: 58   Resp: 18 SpO2: 96 % O2 Device: None (Room air)    Vitals:    10/18/20 0545   Weight: 87.1 kg (192 lb 1.6 oz)     Vital Signs with Ranges  Temp:  [98.4  F (36.9  C)-99.1  F (37.3  C)] 98.7  F (37.1  C)  Pulse:  [55-59] 58  Resp:  [16-18] 18  BP: (138-160)/(78-84) 138/78  SpO2:  [96 %-97 %] 96 %  I/O last 3 completed shifts:  In: 580 [P.O.:580]  Out: 300 [Urine:300]    General/Constitutional:  No acute distress, alert, calm, cooperative  Chest/Respiratory:  Air exchange bilateral lung fields; no rales or wheeze.  Respiration nonlabored.  Cardiovascular: No murmur appreciated.    Gastrointestinal/Abdomen:  soft, nontender, mild ascites  Musculoskeletal:  extremities warm, dry, noncyanotic; no acitve synovitis.  Neurologic: No flap.  Gross CN and motor testing  nonfocal.  Sensation grossly tested intact.  Psychiatric:  Mental status:  Alert, oriented, affect calm, no ideation or remarks of self-harm.  Skin:  No rash noted on gross exam    Medications       atenolol  25 mg Oral Daily     folic acid  1 mg Oral Daily     lidocaine  1 patch Transdermal Q24h    And     lidocaine   Transdermal Q8H     [Held by provider] magnesium oxide  400 mg Oral Daily     mirtazapine  30 mg Oral At Bedtime     multivitamin w/minerals  1 tablet Oral Daily     nicotine  1 patch Transdermal Daily     nicotine   Transdermal Q8H     pantoprazole  40 mg Oral BID     potassium chloride ER  20 mEq Oral BID     tamsulosin  0.4 mg Oral Daily     thiamine  250 mg Intravenous Daily    Followed by     [START ON 10/22/2020] thiamine  100 mg Oral Daily     vancomycin  125 mg Oral 4x Daily     vortioxetine  10 mg Oral Daily       Data   Recent Labs   Lab 10/18/20  0314 10/17/20  1402 10/15/20  0952 10/14/20  1530   WBC  --   --  3.4* 5.5   HGB  --   --  9.5* 10.2*   MCV  --   --  91 91   PLT  --   --  191 271   NA  --  137 138 142   POTASSIUM 5.3 5.4* 4.1 3.9   CHLORIDE  --  111* 109 110*   CO2  --  21 23 22   BUN  --  15 24 27   CR  --  1.64* 1.71* 1.63*   ANIONGAP  --  5 6 10   KEV  --  8.1* 8.2* 8.2*   GLC  --  123* 114* 76   ALBUMIN  --   --  2.9* 3.0*   PROTTOTAL  --   --  6.9 7.4   BILITOTAL  --   --  0.8 0.6   ALKPHOS  --   --  165* 177*   ALT  --   --  18 20   AST  --   --  33 36   LIPASE  --   --   --  236

## 2020-10-18 NOTE — PLAN OF CARE
DATE & TIME: 10/17/2020 2467-2934   Cognitive Concerns/ Orientation : A&Ox4   BEHAVIOR & AGGRESSION TOOL COLOR: green, pleasant and cooperative  CIWA SCORE: 2, 2.   ABNL VS/O2: VSS on RA  MOBILITY: Up with SBA   PAIN MANAGMENT: c/o abd pain, oxycodone given x1  DIET: 2g Na, tolerating well, good appetite  BOWEL/BLADDER: continent  ABNL LAB/BG: Potassium: 5.4 stopped D5, NS, +20K. Bolus NS given. Recheck at 0200.   DRAIN/DEVICES: PIV Saline Locked   SKIN: bruising, skin tears --mepilex CDI  D/C DAY/GOALS/PLACE: pending clinical improvement   OTHER IMPORTANT INFO: LS clear, Psych/CD following. Patient denies suicidal ideation and thoughts of self-harm this shift. Paged MD about Potassium being 5.4 and pt currently infusing d5, NS, +20K. Discontinued fluids. 500cc bolus of NS given, Held scheduled potassium pill, and re-check placed for 0200a.m.

## 2020-10-18 NOTE — PLAN OF CARE
DATE & TIME: 10/18/2020 8909-6517   Cognitive Concerns/ Orientation : A&Ox4   BEHAVIOR & AGGRESSION TOOL COLOR: green, pleasant and cooperative  CIWA SCORE: 3, 0; CIWA precautions discontinued   ABNL VS/O2: VSS on RA  MOBILITY: Up with SBA; ambulated in muniz  PAIN MANAGMENT: c/o abd pain, oxycodone given x2 with relief  DIET: 2g Na, good appetite, tolerating well  BOWEL/BLADDER: continent, voiding adequately  ABNL LAB/BG: n/a  DRAIN/DEVICES: PIV Saline Locked   SKIN: bruising, skin tears --dressings CDI  D/C DAY/GOALS/PLACE: Possible discharge tomorrow  OTHER IMPORTANT INFO: LS clear, Psych/CD following. Patient denies suicidal ideation and thoughts of self-harm this shift. Abd distended and uncomfortable; paracentesis ordered. PT, OT consults added.  Pt reports anxiety, PRN seroquel and atarax utilized, pt found minimal relief.

## 2020-10-19 ENCOUNTER — APPOINTMENT (OUTPATIENT)
Dept: ULTRASOUND IMAGING | Facility: CLINIC | Age: 65
DRG: 896 | End: 2020-10-19
Attending: STUDENT IN AN ORGANIZED HEALTH CARE EDUCATION/TRAINING PROGRAM
Payer: MEDICARE

## 2020-10-19 ENCOUNTER — APPOINTMENT (OUTPATIENT)
Dept: OCCUPATIONAL THERAPY | Facility: CLINIC | Age: 65
DRG: 896 | End: 2020-10-19
Attending: STUDENT IN AN ORGANIZED HEALTH CARE EDUCATION/TRAINING PROGRAM
Payer: MEDICARE

## 2020-10-19 ENCOUNTER — APPOINTMENT (OUTPATIENT)
Dept: PHYSICAL THERAPY | Facility: CLINIC | Age: 65
DRG: 896 | End: 2020-10-19
Attending: STUDENT IN AN ORGANIZED HEALTH CARE EDUCATION/TRAINING PROGRAM
Payer: MEDICARE

## 2020-10-19 LAB
ALBUMIN FLD-MCNC: 0.7 G/DL
ALBUMIN SERPL-MCNC: 2.7 G/DL (ref 3.4–5)
ALP SERPL-CCNC: 156 U/L (ref 40–150)
ALT SERPL W P-5'-P-CCNC: 19 U/L (ref 0–70)
ANION GAP SERPL CALCULATED.3IONS-SCNC: 6 MMOL/L (ref 3–14)
APPEARANCE FLD: NORMAL
AST SERPL W P-5'-P-CCNC: 28 U/L (ref 0–45)
BASOPHILS NFR FLD MANUAL: 1 %
BILIRUB DIRECT SERPL-MCNC: 0.2 MG/DL (ref 0–0.2)
BILIRUB SERPL-MCNC: 0.5 MG/DL (ref 0.2–1.3)
BUN SERPL-MCNC: 19 MG/DL (ref 7–30)
CALCIUM SERPL-MCNC: 8.2 MG/DL (ref 8.5–10.1)
CHLORIDE SERPL-SCNC: 106 MMOL/L (ref 94–109)
CO2 SERPL-SCNC: 20 MMOL/L (ref 20–32)
COLOR FLD: YELLOW
CREAT SERPL-MCNC: 1.96 MG/DL (ref 0.66–1.25)
ERYTHROCYTE [DISTWIDTH] IN BLOOD BY AUTOMATED COUNT: 16.4 % (ref 10–15)
GFR SERPL CREATININE-BSD FRML MDRD: 34 ML/MIN/{1.73_M2}
GLUCOSE SERPL-MCNC: 112 MG/DL (ref 70–99)
HCT VFR BLD AUTO: 26.5 % (ref 40–53)
HGB BLD-MCNC: 8.6 G/DL (ref 13.3–17.7)
INR PPP: 1.37 (ref 0.86–1.14)
LYMPHOCYTES NFR FLD MANUAL: 19 %
MAGNESIUM SERPL-MCNC: 1.6 MG/DL (ref 1.6–2.3)
MCH RBC QN AUTO: 29.9 PG (ref 26.5–33)
MCHC RBC AUTO-ENTMCNC: 32.5 G/DL (ref 31.5–36.5)
MCV RBC AUTO: 92 FL (ref 78–100)
MONOS+MACROS NFR FLD MANUAL: 57 %
NEUTS BAND NFR FLD MANUAL: 16 %
OTHER CELLS FLD MANUAL: 7 %
PHOSPHATE SERPL-MCNC: 3.7 MG/DL (ref 2.5–4.5)
PLATELET # BLD AUTO: 153 10E9/L (ref 150–450)
POTASSIUM SERPL-SCNC: 4.6 MMOL/L (ref 3.4–5.3)
PROT FLD-MCNC: 1.4 G/DL
PROT SERPL-MCNC: 6.3 G/DL (ref 6.8–8.8)
RBC # BLD AUTO: 2.88 10E12/L (ref 4.4–5.9)
SODIUM SERPL-SCNC: 132 MMOL/L (ref 133–144)
SPECIMEN SOURCE FLD: NORMAL
WBC # BLD AUTO: 6 10E9/L (ref 4–11)
WBC # FLD AUTO: 172 /UL

## 2020-10-19 PROCEDURE — 97530 THERAPEUTIC ACTIVITIES: CPT | Mod: GP | Performed by: PHYSICAL THERAPIST

## 2020-10-19 PROCEDURE — 84157 ASSAY OF PROTEIN OTHER: CPT | Performed by: STUDENT IN AN ORGANIZED HEALTH CARE EDUCATION/TRAINING PROGRAM

## 2020-10-19 PROCEDURE — 84100 ASSAY OF PHOSPHORUS: CPT | Performed by: STUDENT IN AN ORGANIZED HEALTH CARE EDUCATION/TRAINING PROGRAM

## 2020-10-19 PROCEDURE — 80048 BASIC METABOLIC PNL TOTAL CA: CPT | Performed by: STUDENT IN AN ORGANIZED HEALTH CARE EDUCATION/TRAINING PROGRAM

## 2020-10-19 PROCEDURE — 36415 COLL VENOUS BLD VENIPUNCTURE: CPT | Performed by: STUDENT IN AN ORGANIZED HEALTH CARE EDUCATION/TRAINING PROGRAM

## 2020-10-19 PROCEDURE — 97750 PHYSICAL PERFORMANCE TEST: CPT | Mod: GP | Performed by: PHYSICAL THERAPIST

## 2020-10-19 PROCEDURE — 85610 PROTHROMBIN TIME: CPT | Performed by: STUDENT IN AN ORGANIZED HEALTH CARE EDUCATION/TRAINING PROGRAM

## 2020-10-19 PROCEDURE — 999N000154 HC STATISTIC RADIOLOGY XRAY, US, CT, MAR, NM

## 2020-10-19 PROCEDURE — 49083 ABD PARACENTESIS W/IMAGING: CPT

## 2020-10-19 PROCEDURE — 99232 SBSQ HOSP IP/OBS MODERATE 35: CPT | Performed by: PSYCHIATRY & NEUROLOGY

## 2020-10-19 PROCEDURE — 250N000011 HC RX IP 250 OP 636: Performed by: INTERNAL MEDICINE

## 2020-10-19 PROCEDURE — 97161 PT EVAL LOW COMPLEX 20 MIN: CPT | Mod: GP | Performed by: PHYSICAL THERAPIST

## 2020-10-19 PROCEDURE — 85027 COMPLETE CBC AUTOMATED: CPT | Performed by: STUDENT IN AN ORGANIZED HEALTH CARE EDUCATION/TRAINING PROGRAM

## 2020-10-19 PROCEDURE — 120N000001 HC R&B MED SURG/OB

## 2020-10-19 PROCEDURE — 83735 ASSAY OF MAGNESIUM: CPT | Performed by: STUDENT IN AN ORGANIZED HEALTH CARE EDUCATION/TRAINING PROGRAM

## 2020-10-19 PROCEDURE — 97165 OT EVAL LOW COMPLEX 30 MIN: CPT | Mod: GO | Performed by: OCCUPATIONAL THERAPIST

## 2020-10-19 PROCEDURE — 97116 GAIT TRAINING THERAPY: CPT | Mod: GP | Performed by: PHYSICAL THERAPIST

## 2020-10-19 PROCEDURE — 80076 HEPATIC FUNCTION PANEL: CPT | Performed by: STUDENT IN AN ORGANIZED HEALTH CARE EDUCATION/TRAINING PROGRAM

## 2020-10-19 PROCEDURE — 82042 OTHER SOURCE ALBUMIN QUAN EA: CPT | Performed by: STUDENT IN AN ORGANIZED HEALTH CARE EDUCATION/TRAINING PROGRAM

## 2020-10-19 PROCEDURE — 89051 BODY FLUID CELL COUNT: CPT | Performed by: STUDENT IN AN ORGANIZED HEALTH CARE EDUCATION/TRAINING PROGRAM

## 2020-10-19 PROCEDURE — 99232 SBSQ HOSP IP/OBS MODERATE 35: CPT | Performed by: STUDENT IN AN ORGANIZED HEALTH CARE EDUCATION/TRAINING PROGRAM

## 2020-10-19 PROCEDURE — 250N000013 HC RX MED GY IP 250 OP 250 PS 637: Performed by: STUDENT IN AN ORGANIZED HEALTH CARE EDUCATION/TRAINING PROGRAM

## 2020-10-19 PROCEDURE — 250N000013 HC RX MED GY IP 250 OP 250 PS 637: Performed by: INTERNAL MEDICINE

## 2020-10-19 PROCEDURE — 258N000003 HC RX IP 258 OP 636: Performed by: INTERNAL MEDICINE

## 2020-10-19 RX ORDER — LIDOCAINE HYDROCHLORIDE 10 MG/ML
10 INJECTION, SOLUTION EPIDURAL; INFILTRATION; INTRACAUDAL; PERINEURAL ONCE
Status: COMPLETED | OUTPATIENT
Start: 2020-10-19 | End: 2020-10-19

## 2020-10-19 RX ORDER — QUETIAPINE FUMARATE 50 MG/1
50 TABLET, FILM COATED ORAL ONCE
Status: COMPLETED | OUTPATIENT
Start: 2020-10-19 | End: 2020-10-19

## 2020-10-19 RX ORDER — ALBUMIN (HUMAN) 12.5 G/50ML
12.5 SOLUTION INTRAVENOUS ONCE
Status: DISCONTINUED | OUTPATIENT
Start: 2020-10-19 | End: 2020-10-19

## 2020-10-19 RX ORDER — LIDOCAINE 40 MG/G
CREAM TOPICAL
Status: DISCONTINUED | OUTPATIENT
Start: 2020-10-19 | End: 2020-11-18 | Stop reason: HOSPADM

## 2020-10-19 RX ADMIN — NICOTINE 1 PATCH: 21 PATCH, EXTENDED RELEASE TRANSDERMAL at 08:27

## 2020-10-19 RX ADMIN — FOLIC ACID 1 MG: 1 TABLET ORAL at 08:28

## 2020-10-19 RX ADMIN — QUETIAPINE FUMARATE 100 MG: 100 TABLET ORAL at 16:11

## 2020-10-19 RX ADMIN — PANTOPRAZOLE SODIUM 40 MG: 40 TABLET, DELAYED RELEASE ORAL at 20:59

## 2020-10-19 RX ADMIN — HYDROXYZINE HYDROCHLORIDE 100 MG: 25 TABLET, FILM COATED ORAL at 00:00

## 2020-10-19 RX ADMIN — QUETIAPINE FUMARATE 100 MG: 100 TABLET ORAL at 09:50

## 2020-10-19 RX ADMIN — PANTOPRAZOLE SODIUM 40 MG: 40 TABLET, DELAYED RELEASE ORAL at 08:28

## 2020-10-19 RX ADMIN — MULTIPLE VITAMINS W/ MINERALS TAB 1 TABLET: TAB at 08:28

## 2020-10-19 RX ADMIN — LIDOCAINE 1 PATCH: 560 PATCH PERCUTANEOUS; TOPICAL; TRANSDERMAL at 20:59

## 2020-10-19 RX ADMIN — POTASSIUM CHLORIDE 20 MEQ: 1500 TABLET, EXTENDED RELEASE ORAL at 15:12

## 2020-10-19 RX ADMIN — QUETIAPINE FUMARATE 50 MG: 50 TABLET ORAL at 20:59

## 2020-10-19 RX ADMIN — VANCOMYCIN HYDROCHLORIDE 125 MG: 125 CAPSULE ORAL at 17:39

## 2020-10-19 RX ADMIN — VANCOMYCIN HYDROCHLORIDE 125 MG: 125 CAPSULE ORAL at 22:48

## 2020-10-19 RX ADMIN — TAMSULOSIN HYDROCHLORIDE 0.4 MG: 0.4 CAPSULE ORAL at 08:28

## 2020-10-19 RX ADMIN — ATENOLOL 25 MG: 25 TABLET ORAL at 08:28

## 2020-10-19 RX ADMIN — OXYCODONE HYDROCHLORIDE 5 MG: 5 TABLET ORAL at 12:17

## 2020-10-19 RX ADMIN — MIRTAZAPINE 30 MG: 15 TABLET, FILM COATED ORAL at 22:48

## 2020-10-19 RX ADMIN — HYDROXYZINE HYDROCHLORIDE 50 MG: 25 TABLET, FILM COATED ORAL at 12:23

## 2020-10-19 RX ADMIN — THIAMINE HYDROCHLORIDE 250 MG: 100 INJECTION, SOLUTION INTRAMUSCULAR; INTRAVENOUS at 08:38

## 2020-10-19 RX ADMIN — ACETAMINOPHEN 650 MG: 325 TABLET, FILM COATED ORAL at 04:23

## 2020-10-19 RX ADMIN — POTASSIUM CHLORIDE 20 MEQ: 1500 TABLET, EXTENDED RELEASE ORAL at 20:59

## 2020-10-19 RX ADMIN — TRAZODONE HYDROCHLORIDE 100 MG: 100 TABLET ORAL at 22:48

## 2020-10-19 RX ADMIN — OXYCODONE HYDROCHLORIDE 5 MG: 5 TABLET ORAL at 04:52

## 2020-10-19 RX ADMIN — VORTIOXETINE 10 MG: 10 TABLET, FILM COATED ORAL at 08:28

## 2020-10-19 RX ADMIN — FLUTICASONE FUROATE AND VILANTEROL TRIFENATATE 1 PUFF: 200; 25 POWDER RESPIRATORY (INHALATION) at 10:03

## 2020-10-19 RX ADMIN — OXYCODONE HYDROCHLORIDE 5 MG: 5 TABLET ORAL at 18:46

## 2020-10-19 RX ADMIN — HYDROXYZINE HYDROCHLORIDE 50 MG: 25 TABLET, FILM COATED ORAL at 09:50

## 2020-10-19 RX ADMIN — VANCOMYCIN HYDROCHLORIDE 125 MG: 125 CAPSULE ORAL at 08:28

## 2020-10-19 RX ADMIN — LIDOCAINE HYDROCHLORIDE 10 ML: 10 INJECTION, SOLUTION EPIDURAL; INFILTRATION; INTRACAUDAL; PERINEURAL at 11:15

## 2020-10-19 RX ADMIN — VANCOMYCIN HYDROCHLORIDE 125 MG: 125 CAPSULE ORAL at 12:18

## 2020-10-19 ASSESSMENT — ACTIVITIES OF DAILY LIVING (ADL)
ADLS_ACUITY_SCORE: 12
ADLS_ACUITY_SCORE: 11
ADLS_ACUITY_SCORE: 11
ADLS_ACUITY_SCORE: 12
ADLS_ACUITY_SCORE: 11
ADLS_ACUITY_SCORE: 11

## 2020-10-19 NOTE — CONSULTS
[facility] Psychiatric Progress Note      Interval History:   Pt seen on station 66.  He is currently without a sitter, not demonstrating any active thoughts of self-harm, no self-injurious behaviors.  iJm was pleasant and cordial with me.  We discussed his commitment process.  Based on my review of records Dr. Brito initiated a commitment process due to his recidivism, inability to maintain sobriety, multiple admissions secondary to alcohol-related issues and expressing suicidal ideation while intoxicated.  At this point his psychiatric status is stable.  We are awaiting a response from the [facility] regarding the commitment process.   Review of systems:   The Review of Systems is negative other than noted in the HPI     Medications:     Current Facility-Administered Medications   Medication     acetaminophen (TYLENOL) tablet 650 mg     atenolol (TENORMIN) tablet 25 mg     bisacodyl (DULCOLAX) Suppository 10 mg     fluticasone-vilanterol (BREO ELLIPTA) 200-25 MCG/INH inhaler 1 puff     folic acid (FOLVITE) tablet 1 mg     hydrOXYzine (ATARAX) tablet  mg     Lidocaine (LIDOCARE) 4 % Patch 1 patch    And     lidocaine patch in PLACE     [Held by provider] magnesium oxide (MAG-OX) tablet 400 mg     magnesium sulfate 4 g in 100 mL sterile water (premade)     melatonin tablet 1 mg     methocarbamol (ROBAXIN) tablet 500 mg     mirtazapine (REMERON) tablet 30 mg     multivitamin w/minerals (THERA-VIT-M) tablet 1 tablet     naloxone (NARCAN) injection 0.1-0.4 mg     nicotine (COMMIT) lozenge 2 mg     nicotine (NICODERM CQ) 21 MG/24HR 24 hr patch 1 patch     nicotine Patch in Place     ondansetron (ZOFRAN-ODT) ODT tab 4 mg    Or     ondansetron (ZOFRAN) injection 4 mg     oxyCODONE (ROXICODONE) tablet 5 mg     pantoprazole (PROTONIX) EC tablet 40 mg     polyethylene glycol (MIRALAX) Packet 17 g     potassium chloride (KLOR-CON) Packet 20-40 mEq     potassium chloride 10 mEq in 100 mL intermittent  infusion with 10 mg lidocaine     potassium chloride 10 mEq in 100 mL sterile water intermittent infusion (premix)     potassium chloride 20 mEq in 50 mL intermittent infusion     potassium chloride ER (KLOR-CON M) CR tablet 20 mEq     potassium chloride ER (KLOR-CON M) CR tablet 20-40 mEq     potassium phosphate 15 mmol in D5W 250 mL intermittent infusion     potassium phosphate 20 mmol in D5W 250 mL intermittent infusion     potassium phosphate 20 mmol in D5W 500 mL intermittent infusion     potassium phosphate 25 mmol in D5W 500 mL intermittent infusion     QUEtiapine (SEROquel) tablet 100 mg     senna-docusate (SENOKOT-S/PERICOLACE) 8.6-50 MG per tablet 1 tablet    Or     senna-docusate (SENOKOT-S/PERICOLACE) 8.6-50 MG per tablet 2 tablet     tamsulosin (FLOMAX) capsule 0.4 mg     thiamine (B-1) 250 mg in sodium chloride 0.9 % 50 mL intermittent infusion    Followed by     [START ON 10/22/2020] thiamine (B-1) tablet 100 mg     traZODone (DESYREL) tablet 100 mg     vancomycin (VANCOCIN) capsule 125 mg     vortioxetine (TRINTELLIX) tablet 10 mg         Mental Status Examination:     Appearance:  awake, alert  Eye Contact:  fair  Speech:  clear, coherent  Psychomotor Behavior:  no evidence of tardive dyskinesia, dystonia, or tics  Mood:  depressed   Affect:  intensity is blunted  Thought Process:  logical no loose associations  Thought Content:  no evidence of suicidal ideation or homicidal ideation  Oriented to:  time, person, and place  Attention Span and Concentration:  intact  Recent and Remote Memory:  intact  Fund of Knowledge: appropriate  Muscle Strength and Tone: normal  Gait and Station: Normal  Insight:  limited  Judgment:  poor        Labs:     Recent Results (from the past 24 hour(s))   Albumin level    Collection Time: 10/18/20  3:37 PM   Result Value Ref Range    Albumin 2.5 (L) 3.4 - 5.0 g/dL         Impression:   Jim presents with intractable alcohol dependence leading to multiple  hospitalizations, expression of suicidal ideation while intoxicated.  There seems to be little recourse at this point but to pursue a commitment process to get him into a safe setting where he has 24-hour supervision and no access to alcohol.  He does not appear overtly depressed or suicidal at this time, no indication to move him to psychiatry.  He is holdable if he demands to leave.  The utility of pursuing additional chemical dependency treatment is suspect in my opinion given his intractable symptoms.      DIagnoses:   Alcohol use disorder, Severe  Major Depressive disorder, recurrent, severe            Plan:   1. Written information given on medications. Side effects, risks, benefits reviewed.  2. Continue trintellix for depression   3.  No need for sitter or transfer to psychiatry at this point  4.  [facility] is currently screening him for commitment  5.  Should he demand to leave the 72-hour hold should be placed until the commitment process is fully vetted and a final determination made regarding disposition    Attestation:  Patient has been seen and evaluated by me,  Donell Davis MD

## 2020-10-19 NOTE — PLAN OF CARE
DATE & TIME: 10/18/2020 5559-1306   Cognitive Concerns/ Orientation : A&Ox4   BEHAVIOR & AGGRESSION TOOL COLOR: green, pleasant and cooperative  CIWA SCORE: CIWA precautions discontinued   ABNL VS/O2: VSS on RA  MOBILITY: Up with SBA  PAIN MANAGMENT: c/o abd pain, oxycodone given x1 with relief  DIET: 2g Na, good appetite, tolerating well  BOWEL/BLADDER: continent  ABNL LAB/BG: none drawn today  DRAIN/DEVICES: PIV Saline Locked   SKIN: bruising, skin tears --dressings CDI  D/C DAY/GOALS/PLACE: pending clinical improvement   OTHER IMPORTANT INFO: LS clear, Psych/CD following. Patient denies suicidal ideation and thoughts of self-harm this shift. Abd distended and uncomfortable for pt; paracentesis ordered for 10/18. PT, OT consults added.

## 2020-10-19 NOTE — PROGRESS NOTES
10/19/20 0947   Signing Clinician's Name / Credentials   Signing clinician's name / credentials Andreina Thrasher PT   Lockhart Balance Scale (BLADIMIR PEREZ, ANDRESSA S, BINTA SUAREZ, DAWNA B: MEASURING BALANCE IN THE ELDERLY: VALIDATION OF AN INSTRUMENT. CAN. J. PUB. HEALTH, JULY/AUGUST SUPPLEMENT 2:S7-11, 1992.)   Sit To Stand 4   Standing Unsupported 4   Sitting Unsupported 4   Stand to Sit 4   Transfers 4   Standing with Eyes Closed 3   Standing Unsupported, Feet Together 2   Reach Forward With Outstretched Arm 1   Retrieve Object From Floor 2   Turning to Look Behind 2   Turn 360 Degrees 2   Placing Alternate Foot on Stool (4-6 inches) 1   Unsupported Tandem Stand (Demonstrate to Subject) 2   One Leg Stand 0   Total Score (A score of 45 or less has been correlated with an increased risk of falls)   Total Score (out of 56) 35

## 2020-10-19 NOTE — PROGRESS NOTES
Care Suites Procedure Nursing Note    Patient Information  Name: Jim Richard  Age: 65 year old    Procedure  Procedure: Paracentesis: patient arrived to department via cart, intake info taken. Ultrasound tech performed preliminary scan to find pockets of fluid. MD arrived to explain procedure, obtained consent. Time out was then done. Procedure begun, no issues, drainage in process. Patient was monitored with BP and O2 sats. Patient tolerated well. VSS. 3600 cc yellow fluid removed from abdomen w/o difficulty. Bandaid applied to site.     1138- Pt back to 627-1 per cart & NA transport.   Procedure start time: 1110  Procedure complete time: 1134  Concerns/abnormal assessment: None  If abnormal assessment, provider notified: N/A  Plan/Other: return to inpatient room    Alis Ramos RN

## 2020-10-19 NOTE — PROGRESS NOTES
[facility]    Hospitalist Progress Note    Date of Service: 10/19/2020    Assessment & Plan      Jim Richard is a 65 year old male was admitted on 10/14/2020 with PMH use disorder, cirrhosis, depression, recent hospitalization for intentional overdose, multiple hospitalization for intoxication who is being admitted again on 10/14/2020, due to continued drinking and suicidal ideation, stating that he would take all of his medications at once.  Recently hospitalized from 10/6 - 10/11 for alcohol intoxication with metabolic acidosis and C. difficile.  After discharge he states he stopped taking all of his medications and has been drinking because he does not care whether he lives or dies.    Depression, anxiety, suicidal ideation   Acute alcohol intoxication  Severe alcohol use disorder - multiple recent admission for intoxication at risk of life-threateningillness/death  Patient has been through treatment multiple times in the past. He states he stating drinking again right after discharge. BAL 0.36 at admission.    - CIWA stopped  - Psychiatry following  -> has initiated a commitment process due to his recidivism, inability to maintain sobriety, multiple admissions secondary to alcohol-related issues and expressing suicidal ideation while intoxicated.     -Continue PTA mirtazapine, trintellix, seroquel and Trazodone, Atarax as needed as well.      Alcoholic cirrhosis with history of esophageal varices and ascites - History of GI bleed.  Grade 1 esophageal varices with portal gastropathy. He c/o diffuse abdominal discomfort and has obvious fluid wave on exam with diffuse mild tenderness on exam. Last paracentesis on 10/9 was negative for SBP and patient currently refusing paracentesis stating pain no different now than prior to that procedure.   - Holding lasix and spironolactone in context of lactic acidosis. Resume tomorrow post paracentesis if remains hemodynamically stable  -  continue PPI   - continue PTA potassium BID and magnesium   - high dose thiamine and multi-vitamins, folic acid ordered.   - continue PTA atenolol   - Paracentesis in AM     Lactic acidosis secondary to above.  Hemodynamically stable with low suspicion for infection / sepsis  -DC IVF, taking in p.o.'s.    C. difficile colitis, diagnosed during his last admission CT A/P with circumferential wall thickening of rectum suggesting distal colitis/proctitis. procalcitonin low at 0.07. WBC 7.7. Afebrile.  Not taking vancomycin and currently having 2-3 loose stools a day on admission.  - oral vancomycin 125 mg every 6 hours with plan to continue to complete 10 more days as ordered at discharge.  Today formed stool.     Chronic kidney disease stage III  Cr 1.63, Baseline creatinine 1.4-1.9.      Chronic anemia, nl MCV, stable  - hgb 10.2      BPH  -Continue flomax     JANIS,   - home CPAP ordered        DVT Prophylaxis: Pneumatic Compression Devices  Code Status: Full Code  Expected discharge: TBD pending Psychiatry and Chemical Dependency plan established.    Chivo Murcia MD  Text Page (7am - 6pm, M-F)    Interval History      No acute events overnight  S/p paracentesis.   No CP/SOB tody.   No fevers or chills  Today patient has no ideation or marks of self-harm.  No tremors, anxiety, afebrile    Physical Exam   Temp: 98.7  F (37.1  C) Temp src: Oral BP: (!) 142/66 Pulse: 62   Resp: 18 SpO2: 95 % O2 Device: None (Room air)    Vitals:    10/18/20 0545   Weight: 87.1 kg (192 lb 1.6 oz)     Vital Signs with Ranges  Temp:  [98.4  F (36.9  C)-98.7  F (37.1  C)] 98.7  F (37.1  C)  Pulse:  [51-71] 62  Resp:  [16-18] 18  BP: (116-172)/(51-80) 142/66  SpO2:  [94 %-97 %] 95 %  I/O last 3 completed shifts:  In: 990 [P.O.:990]  Out: -     General/Constitutional:  No acute distress, alert, calm, cooperative  Chest/Respiratory:  Air exchange bilateral lung fields; no rales or wheeze. Respiration nonlabored.  Cardiovascular: No murmur  appreciated.    Gastrointestinal/Abdomen:  soft, nontender, mild ascites  Musculoskeletal:  extremities warm, dry, noncyanotic; no acitve synovitis.  Neurologic: No flap.  Gross CN and motor testing  nonfocal.  Sensation grossly tested intact.  Psychiatric:  Mental status:  Alert, oriented, affect calm, no ideation or remarks of self-harm.  Skin:  No rash noted on gross exam    Medications     - MEDICATION INSTRUCTIONS -         atenolol  25 mg Oral Daily     folic acid  1 mg Oral Daily     lidocaine  1 patch Transdermal Q24h    And     lidocaine   Transdermal Q8H     [Held by provider] magnesium oxide  400 mg Oral Daily     mirtazapine  30 mg Oral At Bedtime     multivitamin w/minerals  1 tablet Oral Daily     nicotine  1 patch Transdermal Daily     nicotine   Transdermal Q8H     pantoprazole  40 mg Oral BID     potassium chloride ER  20 mEq Oral BID     sodium chloride (PF)  3 mL Intracatheter Q8H     tamsulosin  0.4 mg Oral Daily     thiamine  250 mg Intravenous Daily    Followed by     [START ON 10/22/2020] thiamine  100 mg Oral Daily     vancomycin  125 mg Oral 4x Daily     vortioxetine  10 mg Oral Daily       Data   Recent Labs   Lab 10/19/20  1007 10/18/20  1537 10/18/20  0314 10/17/20  1402 10/15/20  0952 10/14/20  1530   WBC 6.0  --   --   --  3.4* 5.5   HGB 8.6*  --   --   --  9.5* 10.2*   MCV 92  --   --   --  91 91     --   --   --  191 271   INR 1.37*  --   --   --   --   --    *  --   --  137 138 142   POTASSIUM 4.6  --  5.3 5.4* 4.1 3.9   CHLORIDE 106  --   --  111* 109 110*   CO2 20  --   --  21 23 22   BUN 19  --   --  15 24 27   CR 1.96*  --   --  1.64* 1.71* 1.63*   ANIONGAP 6  --   --  5 6 10   KEV 8.2*  --   --  8.1* 8.2* 8.2*   *  --   --  123* 114* 76   ALBUMIN 2.7* 2.5*  --   --  2.9* 3.0*   PROTTOTAL 6.3*  --   --   --  6.9 7.4   BILITOTAL 0.5  --   --   --  0.8 0.6   ALKPHOS 156*  --   --   --  165* 177*   ALT 19  --   --   --  18 20   AST 28  --   --   --  33 36    LIPASE  --   --   --   --   --  236

## 2020-10-19 NOTE — PROGRESS NOTES
10/19/20 0911   Quick Adds   Type of Visit Initial PT Evaluation   Living Environment   People in home other (see comments)  (roommate)   Current Living Arrangements house   Home Accessibility stairs to enter home;stairs within home   Number of Stairs, Main Entrance 6   Stair Railings, Main Entrance railing on left side (ascending)   Number of Stairs, Within Home, Primary 6   Stair Railings, Within Home, Primary railing on left side (ascending)   Transportation Anticipated car, drives self   Living Environment Comments Just got out of 90 day [facility] rehab center   Self-Care   Usual Activity Tolerance good   Regular Exercise No   Equipment Currently Used at Home none   Activity/Exercise/Self-Care Comment independent with all ADLs and mobility   Disability/Function   Concentrating, Remembering or Making Decisions Difficulty yes   Walking or Climbing Stairs Difficulty no   Dressing/Bathing Difficulty no   Fall history within last six months no   General Information   Onset of Illness/Injury or Date of Surgery 10/14/20   Referring Physician Dr. Gordon   Patient/Family Therapy Goals Statement (PT) Pt wants to put house for sale and find assisted living that takes dogs   Pertinent History of Current Problem (include personal factors and/or comorbidities that impact the POC) Pt is a 65 year old male admitted for C Diff and suicide ideation with alcohol abuse    General Observations /86   Cognition   Orientation Status (Cognition) oriented x 4   Pain Assessment   Patient Currently in Pain No   Range of Motion (ROM)   ROM Comment WFL   Strength   Strength Comments LE Grossly 3+/5   Bed Mobility   Comment (Bed Mobility) independent   Transfers   Transfer Safety Comments sit to stand: independent/SBA   Gait/Stairs (Locomotion)   Comment (Gait/Stairs) Pt ambulates with FWW with SBA/Macy for 350ft. Pt has fatigue and SOB after gait. O2 sats 96% on RA. BP increased from 141/86 to 172/78. RN informed   Balance   Balance  "Comments Lockhart : 35/56 requires support for dynamic standing, and for narrow base and eyes closed   Sensory Examination   Sensory Perception WFL   Coordination   Coordination no deficits were identified   Clinical Impression   Criteria for Skilled Therapeutic Intervention yes, treatment indicated   PT Diagnosis (PT) impaired balance   Influenced by the following impairments impaired balance, generalized weakness, decreased endurance   Functional limitations due to impairments fall risk, increased supervision and need for AD   Clinical Presentation Stable/Uncomplicated   Clinical Presentation Rationale clinical judgment   Clinical Decision Making (Complexity) low complexity   Therapy Frequency (PT) Daily   Predicted Duration of Therapy Intervention (days/wks) 1-3 days   Planned Therapy Interventions (PT) balance training;gait training;strengthening   Anticipated Equipment Needs at Discharge (PT)   (pt has a walker)   Risk & Benefits of therapy have been explained participants voiced agreement with care plan;patient   PT Discharge Planning    PT Discharge Recommendation (DC Rec) home with outpatient physical therapy   PT Rationale for DC Rec Pt is able to go home if he uses walker for all mobility. Some concern over compliance using  device because pt needs reminders and education to use device now when testing as fall risk. Recommend OP PT to continue work on balance and fall risk. Anticipate pt will progress to independent with mobility wiht walker to return home or rehab depending on plan for pt.   [facility] [employer] TM \"6 Clicks\"   2016, Trustees of [facility], under license to [employer].  All rights reserved.   6 Clicks Short Forms Basic Mobility Inpatient Short Form   [facility] [employer]  \"6 Clicks\" V.2 Basic Mobility Inpatient Short Form   1. Turning from your back to your side while in a flat bed without using bedrails? 4 - None   2. Moving from lying on your back to sitting on the side of " a flat bed without using bedrails? 4 - None   3. Moving to and from a bed to a chair (including a wheelchair)? 4 - None   4. Standing up from a chair using your arms (e.g., wheelchair, or bedside chair)? 4 - None   5. To walk in hospital room? 3 - A Little   6. Climbing 3-5 steps with a railing? 3 - A Little   Basic Mobility Raw Score (Score out of 24.Lower scores equate to lower levels of function) 22   Total Evaluation Time   Total Evaluation Time (Minutes) 12

## 2020-10-19 NOTE — PROGRESS NOTES
10/19/20 0920   Quick Adds   Type of Visit Initial Occupational Therapy Evaluation   Living Environment   People in home other (see comments)  (roommate)   Current Living Arrangements house   Transportation Anticipated car, drives self   Living Environment Comments Has standard toilet, tub/shower with grab bars   Self-Care   Usual Activity Tolerance good   Equipment Currently Used at Home none   Activity/Exercise/Self-Care Comment Patient independent in ADLs/mobility, independent in community mobility and driving.   General Information   Onset of Illness/Injury or Date of Surgery 10/14/20   Referring Physician Dr. Murcia   Patient/Family Therapy Goal Statement (OT) did not verbalize   Additional Occupational Profile Info/Pertinent History of Current Problem Jim Richard is a 65 year old male was admitted on 10/14/2020 with PMH use disorder, cirrhosis, depression, recent hospitalization for intentional overdose, multiple hospitalization for intoxication who is being admitted again on 10/14/2020, due to continued drinking and suicidal ideation, stating that he would take all of his medications at once.  Recently hospitalized from 10/6 - 10/11 for alcohol intoxication with metabolic acidosis and C. difficile.  After discharge he states he stopped taking all of his medications and has been drinking because he does not care whether he lives or dies.   Cognitive Status Examination   Orientation Status orientation to person, place and time   Follows Commands follows one-step commands   Pain Assessment   Patient Currently in Pain No   Coordination   Coordination Comments Tremors with activity of UB   Bed Mobility   Bed Mobility supine-sit;sit-supine   Sit-Supine Harper (Bed Mobility) modified independence   Transfers   Transfers toilet transfer;shower transfer   Shower Transfer   Type (Shower Transfer) lateral   Harper Level (Shower Transfer) modified independence   Assistive Device (Shower Transfer)  "grab bar, tub rail   Toilet Transfer   Type (Toilet Transfer) sit-stand;stand-sit   Fl[place] Level (Toilet Transfer) modified independence   Activities of Daily Living   BADL Assessment/Intervention lower body dressing   Lower Body Dressing Assessment/Training   Florence Level (Lower Body Dressing) modified independence   Clinical Impression   Criteria for Skilled Therapeutic Interventions Met (OT) yes   OT Diagnosis impaired higher level ADLs/home management   OT Problem List-Impairments impacting ADL activity tolerance impaired   Assessment of Occupational Performance 1-3 Performance Deficits   Identified Performance Deficits home management, higher level ADLs   Planned Therapy Interventions (OT) ADL retraining   Clinical Decision Making Complexity (OT) low complexity   Therapy Frequency (OT) 3x/week   Predicted Duration of Therapy 2-3 days   Risks and Benefits of Treatment have been explained. Yes   Patient, Family & other staff in agreement with plan of care Yes   OT Discharge Planning    OT Discharge Recommendation (DC Rec) home   OT Rationale for DC Rec From a therapy perspective, patient can discharge home.  However, would defer this decision to /CD due to multiple hospitizations.     [facility] [email or web address] TM \"6 Clicks\"   2016, Trustees of [facility], under license to [employer].  All rights reserved.   6 Clicks Short Forms Daily Activity Inpatient Short Form   [facility] [employer]  \"6 Clicks\" Daily Activity Inpatient Short Form   1. Putting on and taking off regular lower body clothing? 4 - None   2. Bathing (including washing, rinsing, drying)? 4 - None   3. Toileting, which includes using toilet, bedpan or urinal? 4 - None   4. Putting on and taking off regular upper body clothing? 4 - None   5. Taking care of personal grooming such as brushing teeth? 4 - None   6. Eating meals? 4 - None   Daily Activity Raw Score (Score out of 24.Lower scores equate to lower levels of function) " 24   Total Evaluation Time (Minutes)   Total Evaluation Time (Minutes) 9

## 2020-10-19 NOTE — PROVIDER NOTIFICATION
Patient expresses anxiety.  States increased when psychiatrist told him he may not be able to go home.  Seroquel given this morning with Atarax with some relief but he feels he could use more.  Text paged to Dr. Murcia.

## 2020-10-19 NOTE — PROGRESS NOTES
Care Management Follow Up Note    Length of Stay (days) 5    Patient plan of care discussed at Interdisciplinary Rounds: yes  Expected Discharge Date: 10/20/20  Concerns to be Addressed: CD/MI petition has been filed by Dr Brito.    Today, writer received a call from Dez Warner,[phone], who is the [facility] Pre-petition Screener who reviewed the petition.  He reports the case was staffed, the department is supporting the CD portion of the  petition but not the MI. Portion.  During one of patient's 2019, he was screened by OT and scored in the dementia range on the SLUMS test.  It will be helpful to have patient screened again before his hearings.  Significant cognitive impairment will impact where patient is committed too.     Anticipated Discharge Disposition:  To bd determined thru the Civil Commitment process  Anticipated Discharge Services:    Anticipated Discharge DME:      Plan:  Await to hear from [facility] Court regarding the schedule for patient's Court Examination and Preliminary Hearing.   OT consult ordered on 10/18.  Will await the cognitive testing results.       NIKKI Ceja

## 2020-10-19 NOTE — PLAN OF CARE
DATE & TIME: 10/19/20 1571-0605   Cognitive Concerns/ Orientation : A&Ox4   BEHAVIOR & AGGRESSION TOOL COLOR: green, pleasant and cooperative  CIWA SCORE: CIWA precautions discontinued   ABNL VS/O2: VSS on RA; BP can be elevated especially after exercise  MOBILITY: Up with SBA/walker/GB  PAIN MANAGMENT: c/o abd and back pain, oxycodone given x1 with relief  DIET: 2g Na, good appetite, tolerating well  BOWEL/BLADDER: continent  ABNL LAB/BG: Na 132, creat 1.46  DRAIN/DEVICES: PIV Saline Locked LEONEL  SKIN: bruising, skin tears --dressings changed  D/C DAY/GOALS/PLACE: pending clinical improvement and determination of commitment  OTHER IMPORTANT INFO: LS clear, Psych/CD following.  PT, OT consults added.  Had paracentesis to R abdomen, 3600 ml removed; bandaid dry.

## 2020-10-20 ENCOUNTER — APPOINTMENT (OUTPATIENT)
Dept: PHYSICAL THERAPY | Facility: CLINIC | Age: 65
DRG: 896 | End: 2020-10-20
Payer: MEDICARE

## 2020-10-20 PROCEDURE — 999N000154 HC STATISTIC RADIOLOGY XRAY, US, CT, MAR, NM

## 2020-10-20 PROCEDURE — 120N000001 HC R&B MED SURG/OB

## 2020-10-20 PROCEDURE — 258N000003 HC RX IP 258 OP 636: Performed by: INTERNAL MEDICINE

## 2020-10-20 PROCEDURE — 250N000013 HC RX MED GY IP 250 OP 250 PS 637: Performed by: INTERNAL MEDICINE

## 2020-10-20 PROCEDURE — 99232 SBSQ HOSP IP/OBS MODERATE 35: CPT | Performed by: STUDENT IN AN ORGANIZED HEALTH CARE EDUCATION/TRAINING PROGRAM

## 2020-10-20 PROCEDURE — 250N000013 HC RX MED GY IP 250 OP 250 PS 637: Performed by: STUDENT IN AN ORGANIZED HEALTH CARE EDUCATION/TRAINING PROGRAM

## 2020-10-20 PROCEDURE — 250N000011 HC RX IP 250 OP 636: Performed by: INTERNAL MEDICINE

## 2020-10-20 PROCEDURE — 97116 GAIT TRAINING THERAPY: CPT | Mod: GP

## 2020-10-20 RX ORDER — SPIRONOLACTONE 25 MG/1
25 TABLET ORAL DAILY
Status: DISCONTINUED | OUTPATIENT
Start: 2020-10-21 | End: 2020-10-28

## 2020-10-20 RX ORDER — FUROSEMIDE 20 MG/1
10 TABLET ORAL DAILY
Status: DISCONTINUED | OUTPATIENT
Start: 2020-10-21 | End: 2020-10-28

## 2020-10-20 RX ADMIN — TAMSULOSIN HYDROCHLORIDE 0.4 MG: 0.4 CAPSULE ORAL at 08:36

## 2020-10-20 RX ADMIN — OXYCODONE HYDROCHLORIDE 5 MG: 5 TABLET ORAL at 18:36

## 2020-10-20 RX ADMIN — PANTOPRAZOLE SODIUM 40 MG: 40 TABLET, DELAYED RELEASE ORAL at 21:24

## 2020-10-20 RX ADMIN — NICOTINE 1 PATCH: 21 PATCH, EXTENDED RELEASE TRANSDERMAL at 08:35

## 2020-10-20 RX ADMIN — POTASSIUM CHLORIDE 20 MEQ: 1500 TABLET, EXTENDED RELEASE ORAL at 08:36

## 2020-10-20 RX ADMIN — POTASSIUM CHLORIDE 20 MEQ: 1500 TABLET, EXTENDED RELEASE ORAL at 21:24

## 2020-10-20 RX ADMIN — VORTIOXETINE 10 MG: 10 TABLET, FILM COATED ORAL at 08:36

## 2020-10-20 RX ADMIN — FOLIC ACID 1 MG: 1 TABLET ORAL at 08:36

## 2020-10-20 RX ADMIN — QUETIAPINE FUMARATE 100 MG: 100 TABLET ORAL at 14:48

## 2020-10-20 RX ADMIN — HYDROXYZINE HYDROCHLORIDE 100 MG: 25 TABLET, FILM COATED ORAL at 18:56

## 2020-10-20 RX ADMIN — ONDANSETRON 4 MG: 4 TABLET, ORALLY DISINTEGRATING ORAL at 10:02

## 2020-10-20 RX ADMIN — OXYCODONE HYDROCHLORIDE 5 MG: 5 TABLET ORAL at 02:14

## 2020-10-20 RX ADMIN — MULTIPLE VITAMINS W/ MINERALS TAB 1 TABLET: TAB at 08:36

## 2020-10-20 RX ADMIN — VANCOMYCIN HYDROCHLORIDE 125 MG: 125 CAPSULE ORAL at 14:55

## 2020-10-20 RX ADMIN — ATENOLOL 25 MG: 25 TABLET ORAL at 08:36

## 2020-10-20 RX ADMIN — VANCOMYCIN HYDROCHLORIDE 125 MG: 125 CAPSULE ORAL at 08:36

## 2020-10-20 RX ADMIN — OXYCODONE HYDROCHLORIDE 5 MG: 5 TABLET ORAL at 11:59

## 2020-10-20 RX ADMIN — HYDROXYZINE HYDROCHLORIDE 100 MG: 25 TABLET, FILM COATED ORAL at 10:42

## 2020-10-20 RX ADMIN — VANCOMYCIN HYDROCHLORIDE 125 MG: 125 CAPSULE ORAL at 17:33

## 2020-10-20 RX ADMIN — MIRTAZAPINE 30 MG: 15 TABLET, FILM COATED ORAL at 21:31

## 2020-10-20 RX ADMIN — QUETIAPINE FUMARATE 100 MG: 100 TABLET ORAL at 02:12

## 2020-10-20 RX ADMIN — TRAZODONE HYDROCHLORIDE 100 MG: 100 TABLET ORAL at 21:24

## 2020-10-20 RX ADMIN — PANTOPRAZOLE SODIUM 40 MG: 40 TABLET, DELAYED RELEASE ORAL at 08:36

## 2020-10-20 RX ADMIN — LIDOCAINE 1 PATCH: 560 PATCH PERCUTANEOUS; TOPICAL; TRANSDERMAL at 21:31

## 2020-10-20 RX ADMIN — THIAMINE HYDROCHLORIDE 250 MG: 100 INJECTION, SOLUTION INTRAMUSCULAR; INTRAVENOUS at 08:41

## 2020-10-20 RX ADMIN — VANCOMYCIN HYDROCHLORIDE 125 MG: 125 CAPSULE ORAL at 21:24

## 2020-10-20 ASSESSMENT — ACTIVITIES OF DAILY LIVING (ADL)
ADLS_ACUITY_SCORE: 12
ADLS_ACUITY_SCORE: 11
ADLS_ACUITY_SCORE: 12
ADLS_ACUITY_SCORE: 11

## 2020-10-20 NOTE — PLAN OF CARE
"OT- Attempted session and attempted cognitive screen. Pt initially receptive to OT session and cognitive screen, however then reported  \"I have done this in the past, and will not do it again. Look it up from last time.\" Discussed with care coordinator.   "

## 2020-10-20 NOTE — PLAN OF CARE
DATE & TIME: 10/19/20 7012-3004  Cognitive Concerns/ Orientation : A&Ox4   BEHAVIOR & AGGRESSION TOOL COLOR: Green, pleasant and cooperative  CIWA SCORE: CIWA precautions discontinued   ABNL VS/O2: VSS on RA  MOBILITY: Up with SBA/walker/GB  PAIN MANAGMENT: C/o abd and back pain, PRN oxycodone given x1 with relief.  DIET: 2g Na, good appetite, tolerating well  BOWEL/BLADDER: Continent  ABNL LAB/BG: Na 132, Cr 1.46, Hgb 8.6  DRAIN/DEVICES: PIV SL  SKIN: Bruising, skin tears (dressings CDI)  D/C DAY/GOALS/PLACE: pending clinical improvement and determination of commitment  OTHER IMPORTANT INFO: Enteric precautions, on oral Vanco for Cdiff. Psych/CD following. PT, OT consults added. Had paracentesis to R abdomen, 3600 ml removed, bandaid CDI. PRN Seroquel given x1.

## 2020-10-20 NOTE — PLAN OF CARE
DATE & TIME: 10/19/20 3525-8339  Cognitive Concerns/ Orientation: A&Ox4   BEHAVIOR & AGGRESSION TOOL COLOR: Green, flat and quiet affect. Verbalized feelings of being depressed, no suicidal thoughts. Additional PRN Seroquel added d/t increased depressive mood [see note].   CIWA SCORE: CIWA precautions discontinued.   ABNL VS/O2: VSS on RA, remains afebrile.   MOBILITY: Up with SBA with walker and GB. If able pt requesting to be independent to bathroom; nursing will continue to assess mobility status.   PAIN MANAGMENT: C/o abd and back pain, has lidocaine patch on LL back. PRN medications helping to manage medications thorughtout day [see MAR]. Overnight pt received PRN oxycodone x1. PRN Seroquel given x1.   DIET: 2g Na, good appetite, tolerating well  BOWEL/BLADDER: Continent  ABNL LAB/BG: Na 132, Cr 1.46, Hgb 8.6, INR 1.37  DRAIN/DEVICES: PIV SL  SKIN: Bruising, skin tears (dressings CDI). Paracentesis today, 3.6L removed, dressing to R abdomen; CDI.   D/C DAY/GOALS/PLACE: Pending clinical improvement and determination of commitment.   OTHER IMPORTANT INFO: Enteric precautions, on oral Vanco for Cdiff. Psych/CD following. PT, OT consults added. Nursing will continue to monitor.

## 2020-10-20 NOTE — PROGRESS NOTES
"SPIRITUAL HEALTH SERVICES Progress Note  [facility] 65    Length-of-Stay visit.    Ptnt said he didn't have much to talk about, as he is \"doing ok\" and didn't have any needs. He said \"every day, little by little, is getting better.\"    I encouraged him to request a  if he would like to share more.    SH continues to be available.    Andreina Cortez  Chaplain Resident  "

## 2020-10-20 NOTE — PROGRESS NOTES
[facility]    Hospitalist Progress Note    Date of Service: 10/20/2020    Assessment & Plan      Jim Richard is a 65 year old male was admitted on 10/14/2020 with PMH use disorder, cirrhosis, depression, recent hospitalization for intentional overdose, multiple hospitalization for intoxication who is being admitted again on 10/14/2020, due to continued drinking and suicidal ideation, stating that he would take all of his medications at once.  Recently hospitalized from 10/6 - 10/11 for alcohol intoxication with metabolic acidosis and C. difficile.  After discharge he states he stopped taking all of his medications and has been drinking because he does not care whether he lives or dies.    Depression, anxiety, suicidal ideation   Acute alcohol intoxication  Severe alcohol use disorder - multiple recent admission for intoxication at risk of life-threateningillness/death  Patient has been through treatment multiple times in the past. He states he stating drinking again right after discharge. BAL 0.36 at admission.    - CIWA stopped  - Psychiatry following  -> has initiated a commitment process due to his recidivism, inability to maintain sobriety, multiple admissions secondary to alcohol-related issues and expressing suicidal ideation while intoxicated.     -Continue PTA mirtazapine, trintellix, seroquel and Trazodone, Atarax as needed as well.      Alcoholic cirrhosis with history of esophageal varices and ascites - History of GI bleed.  Grade 1 esophageal varices with portal gastropathy. He c/o diffuse abdominal discomfort and has obvious fluid wave on exam with diffuse mild tenderness on exam. Last paracentesis on 10/9 was negative for SBP and patient currently refusing paracentesis stating pain no different now than prior to that procedure. S/p paracentesis 10/19.  Plan:  - Can resume lasix and spironolactone  - continue PPI   - continue PTA potassium BID  - high dose thiamine and  multi-vitamins, folic acid ordered.   - continue PTA atenolol     Lactic acidosis secondary to above.  Hemodynamically stable with low suspicion for infection / sepsis  -DC IVF, taking in p.o.'s.    C. difficile colitis, diagnosed during his last admission CT A/P with circumferential wall thickening of rectum suggesting distal colitis/proctitis. procalcitonin low at 0.07. WBC 7.7. Afebrile.  Not taking vancomycin and currently having 2-3 loose stools a day on admission.  - oral vancomycin 125 mg every 6 hours with plan to continue to complete 10 more days as ordered at discharge.  Today formed stool.     Chronic kidney disease stage III  Cr 1.63, Baseline creatinine 1.4-1.9.      Chronic anemia, nl MCV, stable  - hgb 10.2      BPH  -Continue flomax     JANIS,   - home CPAP ordered        DVT Prophylaxis: Pneumatic Compression Devices  Code Status: Full Code  Expected discharge: TBD pending Psychiatry and Chemical Dependency plan established.    Chivo Murcia MD  Text Page (7am - 6pm, M-F)    Interval History      No acute events overnight  S/p paracentesis.   No CP/SOB tody.   No fevers or chills  Today patient has no ideation or marks of self-harm.  No tremors, anxiety, afebrile    Physical Exam   Temp: 98.9  F (37.2  C) Temp src: Oral BP: 112/60 Pulse: 53   Resp: 17 SpO2: 96 % O2 Device: None (Room air)    Vitals:    10/18/20 0545   Weight: 87.1 kg (192 lb 1.6 oz)     Vital Signs with Ranges  Temp:  [98.5  F (36.9  C)-98.9  F (37.2  C)] 98.9  F (37.2  C)  Pulse:  [53-65] 53  Resp:  [17-20] 17  BP: (112-137)/(60-73) 112/60  SpO2:  [96 %] 96 %  I/O last 3 completed shifts:  In: 240 [P.O.:240]  Out: -     General/Constitutional:  No acute distress, alert, calm, cooperative  Chest/Respiratory:  CTABL  Cardiovascular: No murmur appreciated.    Gastrointestinal/Abdomen:  soft, nontender, mild ascites  Musculoskeletal:  extremities warm, dry, noncyanotic; no acitve synovitis.  Neurologic: No flap.  Gross CN and motor testing   nonfocal.  Sensation grossly tested intact.  Psychiatric:  Mental status:  Alert, oriented, affect calm, no ideation or remarks of self-harm.  Skin:  No rash noted on gross exam    Medications     - MEDICATION INSTRUCTIONS -         atenolol  25 mg Oral Daily     folic acid  1 mg Oral Daily     lidocaine  1 patch Transdermal Q24h    And     lidocaine   Transdermal Q8H     [Held by provider] magnesium oxide  400 mg Oral Daily     mirtazapine  30 mg Oral At Bedtime     multivitamin w/minerals  1 tablet Oral Daily     nicotine  1 patch Transdermal Daily     nicotine   Transdermal Q8H     pantoprazole  40 mg Oral BID     potassium chloride ER  20 mEq Oral BID     sodium chloride (PF)  3 mL Intracatheter Q8H     tamsulosin  0.4 mg Oral Daily     thiamine  250 mg Intravenous Daily    Followed by     [START ON 10/22/2020] thiamine  100 mg Oral Daily     vancomycin  125 mg Oral 4x Daily     vortioxetine  10 mg Oral Daily       Data   Recent Labs   Lab 10/19/20  1007 10/18/20  1537 10/18/20  0314 10/17/20  1402 10/15/20  0952 10/14/20  1530   WBC 6.0  --   --   --  3.4* 5.5   HGB 8.6*  --   --   --  9.5* 10.2*   MCV 92  --   --   --  91 91     --   --   --  191 271   INR 1.37*  --   --   --   --   --    *  --   --  137 138 142   POTASSIUM 4.6  --  5.3 5.4* 4.1 3.9   CHLORIDE 106  --   --  111* 109 110*   CO2 20  --   --  21 23 22   BUN 19  --   --  15 24 27   CR 1.96*  --   --  1.64* 1.71* 1.63*   ANIONGAP 6  --   --  5 6 10   KEV 8.2*  --   --  8.1* 8.2* 8.2*   *  --   --  123* 114* 76   ALBUMIN 2.7* 2.5*  --   --  2.9* 3.0*   PROTTOTAL 6.3*  --   --   --  6.9 7.4   BILITOTAL 0.5  --   --   --  0.8 0.6   ALKPHOS 156*  --   --   --  165* 177*   ALT 19  --   --   --  18 20   AST 28  --   --   --  33 36   LIPASE  --   --   --   --   --  236

## 2020-10-20 NOTE — PLAN OF CARE
DATE & TIME: 10/20/20 4199-8727  Cognitive Concerns/ Orientation: A&Ox4   BEHAVIOR & AGGRESSION TOOL COLOR: Green   CIWA SCORE: CIWA precautions discontinued.   ABNL VS/O2: VSS on RA  MOBILITY: Up independently; ambulates in halls often  PAIN MANAGMENT: C/o back pain, oxy given x1 with relief  DIET: 2g Na, good appetite, tolerating well  BOWEL/BLADDER: Continent  ABNL LAB/BG: None drawn today  DRAIN/DEVICES: PIV SL  SKIN: Bruising, skin tears (dressings CDI). Paracentesis dressing CDI.   D/C DAY/GOALS/PLACE: Pending clinical improvement and determination of commitment.   OTHER IMPORTANT INFO: Enteric isolation maintained, on oral Vanco for Cdiff. PT, OT, Psych, CD following.

## 2020-10-20 NOTE — PROVIDER NOTIFICATION
MD Notification    Notified Person: MD    Notified Person Name: Dr. Spears    Notification Date/Time: 10/19/2020 1944    Notification Interaction: [employer] paging    Purpose of Notification: Pt reporting increased depression. Out of PRNs. Suggestion? Thank you    Orders Received: Another PRN Dose of Seroquel.     Comments: Pt reporting continued or worsening feelings of his depression. Stating he medications aren't working. Pt denies any suicidal thoughts. Primary RN updated on pt interaction with writer. Writer paged MD on behalf of primary RN. Nursing will continue to monitor.

## 2020-10-21 ENCOUNTER — APPOINTMENT (OUTPATIENT)
Dept: OCCUPATIONAL THERAPY | Facility: CLINIC | Age: 65
DRG: 896 | End: 2020-10-21
Payer: MEDICARE

## 2020-10-21 LAB
ANION GAP SERPL CALCULATED.3IONS-SCNC: 4 MMOL/L (ref 3–14)
BUN SERPL-MCNC: 25 MG/DL (ref 7–30)
CALCIUM SERPL-MCNC: 8.2 MG/DL (ref 8.5–10.1)
CHLORIDE SERPL-SCNC: 110 MMOL/L (ref 94–109)
CO2 SERPL-SCNC: 23 MMOL/L (ref 20–32)
CREAT SERPL-MCNC: 2.16 MG/DL (ref 0.66–1.25)
ERYTHROCYTE [DISTWIDTH] IN BLOOD BY AUTOMATED COUNT: 16.3 % (ref 10–15)
GFR SERPL CREATININE-BSD FRML MDRD: 31 ML/MIN/{1.73_M2}
GLUCOSE SERPL-MCNC: 98 MG/DL (ref 70–99)
HCT VFR BLD AUTO: 28.1 % (ref 40–53)
HGB BLD-MCNC: 9.1 G/DL (ref 13.3–17.7)
MCH RBC QN AUTO: 29.7 PG (ref 26.5–33)
MCHC RBC AUTO-ENTMCNC: 32.4 G/DL (ref 31.5–36.5)
MCV RBC AUTO: 92 FL (ref 78–100)
PLATELET # BLD AUTO: 179 10E9/L (ref 150–450)
POTASSIUM SERPL-SCNC: 4.4 MMOL/L (ref 3.4–5.3)
RBC # BLD AUTO: 3.06 10E12/L (ref 4.4–5.9)
SODIUM SERPL-SCNC: 137 MMOL/L (ref 133–144)
WBC # BLD AUTO: 5.1 10E9/L (ref 4–11)

## 2020-10-21 PROCEDURE — 80048 BASIC METABOLIC PNL TOTAL CA: CPT | Performed by: STUDENT IN AN ORGANIZED HEALTH CARE EDUCATION/TRAINING PROGRAM

## 2020-10-21 PROCEDURE — 97535 SELF CARE MNGMENT TRAINING: CPT | Mod: GO | Performed by: OCCUPATIONAL THERAPY ASSISTANT

## 2020-10-21 PROCEDURE — 250N000011 HC RX IP 250 OP 636: Performed by: INTERNAL MEDICINE

## 2020-10-21 PROCEDURE — 250N000013 HC RX MED GY IP 250 OP 250 PS 637: Performed by: INTERNAL MEDICINE

## 2020-10-21 PROCEDURE — 258N000003 HC RX IP 258 OP 636: Performed by: INTERNAL MEDICINE

## 2020-10-21 PROCEDURE — 120N000001 HC R&B MED SURG/OB

## 2020-10-21 PROCEDURE — 99232 SBSQ HOSP IP/OBS MODERATE 35: CPT | Performed by: STUDENT IN AN ORGANIZED HEALTH CARE EDUCATION/TRAINING PROGRAM

## 2020-10-21 PROCEDURE — 85027 COMPLETE CBC AUTOMATED: CPT | Performed by: STUDENT IN AN ORGANIZED HEALTH CARE EDUCATION/TRAINING PROGRAM

## 2020-10-21 PROCEDURE — 36415 COLL VENOUS BLD VENIPUNCTURE: CPT | Performed by: STUDENT IN AN ORGANIZED HEALTH CARE EDUCATION/TRAINING PROGRAM

## 2020-10-21 PROCEDURE — 250N000013 HC RX MED GY IP 250 OP 250 PS 637: Performed by: STUDENT IN AN ORGANIZED HEALTH CARE EDUCATION/TRAINING PROGRAM

## 2020-10-21 RX ORDER — QUETIAPINE FUMARATE 25 MG/1
25 TABLET, FILM COATED ORAL
Status: DISCONTINUED | OUTPATIENT
Start: 2020-10-21 | End: 2020-10-26

## 2020-10-21 RX ORDER — HYDROXYZINE HYDROCHLORIDE 25 MG/1
25-50 TABLET, FILM COATED ORAL
Status: DISCONTINUED | OUTPATIENT
Start: 2020-10-21 | End: 2020-11-02

## 2020-10-21 RX ORDER — HYDROXYZINE HYDROCHLORIDE 25 MG/1
50 TABLET, FILM COATED ORAL 3 TIMES DAILY PRN
Status: DISCONTINUED | OUTPATIENT
Start: 2020-10-21 | End: 2020-11-02

## 2020-10-21 RX ADMIN — POTASSIUM CHLORIDE 20 MEQ: 1500 TABLET, EXTENDED RELEASE ORAL at 21:48

## 2020-10-21 RX ADMIN — OXYCODONE HYDROCHLORIDE 5 MG: 5 TABLET ORAL at 08:39

## 2020-10-21 RX ADMIN — PANTOPRAZOLE SODIUM 40 MG: 40 TABLET, DELAYED RELEASE ORAL at 08:39

## 2020-10-21 RX ADMIN — FUROSEMIDE 10 MG: 20 TABLET ORAL at 08:39

## 2020-10-21 RX ADMIN — POTASSIUM CHLORIDE 20 MEQ: 1500 TABLET, EXTENDED RELEASE ORAL at 08:39

## 2020-10-21 RX ADMIN — VORTIOXETINE 10 MG: 10 TABLET, FILM COATED ORAL at 08:39

## 2020-10-21 RX ADMIN — OXYCODONE HYDROCHLORIDE 5 MG: 5 TABLET ORAL at 16:51

## 2020-10-21 RX ADMIN — QUETIAPINE 25 MG: 25 TABLET ORAL at 21:49

## 2020-10-21 RX ADMIN — ATENOLOL 25 MG: 25 TABLET ORAL at 08:39

## 2020-10-21 RX ADMIN — MULTIPLE VITAMINS W/ MINERALS TAB 1 TABLET: TAB at 08:39

## 2020-10-21 RX ADMIN — VANCOMYCIN HYDROCHLORIDE 125 MG: 125 CAPSULE ORAL at 12:44

## 2020-10-21 RX ADMIN — VANCOMYCIN HYDROCHLORIDE 125 MG: 125 CAPSULE ORAL at 21:49

## 2020-10-21 RX ADMIN — QUETIAPINE FUMARATE 100 MG: 100 TABLET ORAL at 00:24

## 2020-10-21 RX ADMIN — THIAMINE HYDROCHLORIDE 250 MG: 100 INJECTION, SOLUTION INTRAMUSCULAR; INTRAVENOUS at 09:40

## 2020-10-21 RX ADMIN — SPIRONOLACTONE 25 MG: 25 TABLET, FILM COATED ORAL at 08:39

## 2020-10-21 RX ADMIN — HYDROXYZINE HYDROCHLORIDE 50 MG: 25 TABLET, FILM COATED ORAL at 22:59

## 2020-10-21 RX ADMIN — LIDOCAINE 1 PATCH: 560 PATCH PERCUTANEOUS; TOPICAL; TRANSDERMAL at 21:50

## 2020-10-21 RX ADMIN — VANCOMYCIN HYDROCHLORIDE 125 MG: 125 CAPSULE ORAL at 08:39

## 2020-10-21 RX ADMIN — HYDROXYZINE HYDROCHLORIDE 100 MG: 25 TABLET, FILM COATED ORAL at 14:41

## 2020-10-21 RX ADMIN — PANTOPRAZOLE SODIUM 40 MG: 40 TABLET, DELAYED RELEASE ORAL at 21:49

## 2020-10-21 RX ADMIN — MIRTAZAPINE 30 MG: 15 TABLET, FILM COATED ORAL at 21:49

## 2020-10-21 RX ADMIN — NICOTINE 1 PATCH: 21 PATCH, EXTENDED RELEASE TRANSDERMAL at 08:38

## 2020-10-21 RX ADMIN — QUETIAPINE FUMARATE 100 MG: 100 TABLET ORAL at 15:55

## 2020-10-21 RX ADMIN — TAMSULOSIN HYDROCHLORIDE 0.4 MG: 0.4 CAPSULE ORAL at 08:39

## 2020-10-21 RX ADMIN — VANCOMYCIN HYDROCHLORIDE 125 MG: 125 CAPSULE ORAL at 17:47

## 2020-10-21 RX ADMIN — FOLIC ACID 1 MG: 1 TABLET ORAL at 08:39

## 2020-10-21 ASSESSMENT — ACTIVITIES OF DAILY LIVING (ADL)
ADLS_ACUITY_SCORE: 12
ADLS_ACUITY_SCORE: 12
ADLS_ACUITY_SCORE: 11
ADLS_ACUITY_SCORE: 12

## 2020-10-21 NOTE — PROGRESS NOTES
CLINICAL NUTRITION SERVICES - REASSESSMENT NOTE    Malnutrition: (10/16)   % Weight Loss:  Weight loss does not meet criteria for malnutrition   % Intake:  Decreased intake does not meet criteria for malnutrition - pt states was eating meals TID at last facility   Subcutaneous Fat Loss:  Orbital region mild depletion and Upper arm region mild depletion  Muscle Loss:  Temporal region mild depletion, Clavicle bone region mild depletion and Dorsal hand region mild depletion  Fluid Retention:  None noted     Malnutrition Diagnosis: Non-Severe malnutrition  In Context of:  Chronic illness or disease  Environmental or social circumstances     EVALUATION OF PROGRESS TOWARD GOALS   Diet: 2g Na diet  Strawberry banana smoothie supplements w/ lunch and dinner    Intake/Tolerance:   - Patient is sleeping soundly at attempt to visit.   - Per review of intakes/meals, pt is consuming 100% of meals TID and supplements. He is ordering adequately on a 2g Na diet.   - Stooling: Last BM 10/21    ASSESSED NUTRITION NEEDS:  Dosing Weight 70.7 kg - adjusted   Estimated Energy Needs: 3470-9402 kcals (25-30 Kcal/Kg)  Justification: maintenance  Estimated Protein Needs:  grams protein (1.2-1.5 g pro/Kg)  Justification: preservation of lean body mass  Estimated Fluid Needs: 1 mL/kcal  Justification: maintenance    NEW FINDINGS:   - Labs and Meds reviewed   Cr 2.16 - H  BGs acceptable     Folic Acid 1 mg, Thera vit M, Thiamine 100 mg daily   Lasix - diuresis   Mag Ox, KCl - supplementation   Remeron 30 mg q HS --> depression, has appetite stimulant effect    Previous Goals:   Intake of at least 75% adequate meals TID  Evaluation: Met    Previous Nutrition Diagnosis:   Malnutrition related to inconsistent food access/intake outside of hospital due to chronic alcohol abuse as evidenced by frequent re-admissions for similar problems, e/o fat and muscle wasting.  Evaluation: Improving - eating well here, as per usual trend.     CURRENT  NUTRITION DIAGNOSIS  No nutrition diagnosis identified at this time     INTERVENTIONS  Recommendations / Nutrition Prescription  2g Na diet for fluid retention  Continue smoothies BID w/ meals   Continue micronutrient supplementation as ordered    Implementation  None new today    Goals  Intake of >75% meals TID.     MONITORING AND EVALUATION:  Progress towards goals will be monitored and evaluated per protocol and Practice Guidelines    Summer Isaac RD, LD  [facility] [place], [address], 54, [facility]   Pager: [phone]  Weekend Pager: [phone]

## 2020-10-21 NOTE — PLAN OF CARE
7448-3233: A&Ox4. Anxious. Tremulous. VSS on RA. Using oxy and lidocaine patch for back pain. No stools overnight. Paracentesis site, CDI. Multiple skin tears to bilat arms, mepilex in place. Enteric precautions for recent C Diff, on oral vanco. Up independently. Walks halls. Here voluntarily. Slept well around 0000 prn seroquel.

## 2020-10-21 NOTE — PLAN OF CARE
"DATE & TIME: 10/20/20 5566-3573  Cognitive Concerns/ Orientation: A&Ox4   BEHAVIOR & AGGRESSION TOOL COLOR: Green   CIWA SCORE: NA   ABNL VS/O2: VSS on RA  MOBILITY: Up independently; ambulates in halls often  PAIN MANAGMENT: C/o back pain, oxy given x1 with relief- lidocaine patch in place.   DIET: 2g Na+, good appetite, tolerating well  BOWEL/BLADDER: Continent  ABNL LAB/BG: NA  DRAIN/DEVICES: PIV SL  SKIN: Bruising, skin tears (dressings CDI). Paracentesis dressing CDI.   D/C DAY/GOALS/PLACE: Pending clinical improvement and determination of commitment.   OTHER IMPORTANT INFO: Enteric isolation maintained, on oral Vanco for Cdiff. PT, OT, Psych, CD following.     Patient more frustrated and anxious today about \"his situation.\" Wishes he could just return home.    "

## 2020-10-21 NOTE — PROGRESS NOTES
[facility]    Hospitalist Progress Note    Date of Service: 10/21/2020    Assessment & Plan      Jim Richard is a 65 year old male was admitted on 10/14/2020 with PMH use disorder, cirrhosis, depression, recent hospitalization for intentional overdose, multiple hospitalization for intoxication who is being admitted again on 10/14/2020, due to continued drinking and suicidal ideation, stating that he would take all of his medications at once.  Recently hospitalized from 10/6 - 10/11 for alcohol intoxication with metabolic acidosis and C. difficile.  After discharge he states he stopped taking all of his medications and has been drinking because he does not care whether he lives or dies.    Depression, anxiety, suicidal ideation   Acute alcohol intoxication  Severe alcohol use disorder - multiple recent admission for intoxication at risk of life-threateningillness/death  Patient has been through treatment multiple times in the past. He states he stating drinking again right after discharge. BAL 0.36 at admission.    - CIWA stopped  - Psychiatry following  -> has initiated a commitment process due to his recidivism, inability to maintain sobriety, multiple admissions secondary to alcohol-related issues and expressing suicidal ideation while intoxicated.     -Continue PTA mirtazapine, trintellix, seroquel and Trazodone, Atarax as needed as well.      Alcoholic cirrhosis with history of esophageal varices and ascites - History of GI bleed.  Grade 1 esophageal varices with portal gastropathy. He c/o diffuse abdominal discomfort and has obvious fluid wave on exam with diffuse mild tenderness on exam. Last paracentesis on 10/9 was negative for SBP and patient currently refusing paracentesis stating pain no different now than prior to that procedure. S/p paracentesis 10/19.  Plan:  - Can resume lasix and spironolactone  - continue PPI   - continue PTA potassium BID  - high dose thiamine and  multi-vitamins, folic acid ordered.   - continue PTA atenolol     Lactic acidosis secondary to above.  Hemodynamically stable with low suspicion for infection / sepsis  -DC IVF, taking in p.o.'s.    C. difficile colitis, diagnosed during his last admission CT A/P with circumferential wall thickening of rectum suggesting distal colitis/proctitis. procalcitonin low at 0.07. WBC 7.7. Afebrile.  Not taking vancomycin and currently having 2-3 loose stools a day on admission.  - oral vancomycin 125 mg every 6 hours with plan to continue to complete 10 more days as ordered at discharge.  Today formed stool.     Chronic kidney disease stage III  Cr 1.63, Baseline creatinine 1.4-1.9.      Chronic anemia, nl MCV, stable  - hgb 10.2      BPH  -Continue flomax     JANIS,   - home CPAP ordered        DVT Prophylaxis: Pneumatic Compression Devices  Code Status: Full Code  Expected discharge: TBD pending Psychiatry and commitment process      Chivo Murcia MD  Text Page (7am - 6pm, M-F)    Interval History      No acute events overnight  Would like to not pursue commitment   No CP/SOB tody.   No fevers or chills  Today patient has no ideation or marks of self-harm.  No tremors, anxiety, afebrile    Physical Exam   Temp: 99.2  F (37.3  C) Temp src: Oral BP: 134/67 Pulse: 50   Resp: 18 SpO2: 97 % O2 Device: None (Room air)    Vitals:    10/18/20 0545   Weight: 87.1 kg (192 lb 1.6 oz)     Vital Signs with Ranges  Temp:  [97.9  F (36.6  C)-99.2  F (37.3  C)] 99.2  F (37.3  C)  Pulse:  [50-59] 50  Resp:  [18-20] 18  BP: (134-143)/(65-78) 134/67  SpO2:  [97 %-99 %] 97 %  I/O last 3 completed shifts:  In: 290 [P.O.:290]  Out: -     General/Constitutional:  No acute distress, alert, calm, cooperative  Chest/Respiratory:  CTABL  Cardiovascular: No murmur appreciated.    Gastrointestinal/Abdomen:  soft, nontender, mild ascites  Musculoskeletal:  extremities warm, dry, noncyanotic; no acitve synovitis.  Neurologic: BADILLO and A/Ox3  Psychiatric:   Mental status:  Alert, oriented, affect calm, no ideation or remarks of self-harm.  Skin:  No rash noted on gross exam    Medications     - MEDICATION INSTRUCTIONS -         atenolol  25 mg Oral Daily     folic acid  1 mg Oral Daily     furosemide  10 mg Oral Daily     lidocaine  1 patch Transdermal Q24h    And     lidocaine   Transdermal Q8H     [Held by provider] magnesium oxide  400 mg Oral Daily     mirtazapine  30 mg Oral At Bedtime     multivitamin w/minerals  1 tablet Oral Daily     nicotine  1 patch Transdermal Daily     nicotine   Transdermal Q8H     pantoprazole  40 mg Oral BID     potassium chloride ER  20 mEq Oral BID     sodium chloride (PF)  3 mL Intracatheter Q8H     spironolactone  25 mg Oral Daily     tamsulosin  0.4 mg Oral Daily     vancomycin  125 mg Oral 4x Daily     vortioxetine  10 mg Oral Daily       Data   Recent Labs   Lab 10/21/20  0912 10/19/20  1007 10/18/20  1537 10/18/20  0314 10/17/20  1402 10/15/20  0952   WBC 5.1 6.0  --   --   --  3.4*   HGB 9.1* 8.6*  --   --   --  9.5*   MCV 92 92  --   --   --  91    153  --   --   --  191   INR  --  1.37*  --   --   --   --     132*  --   --  137 138   POTASSIUM 4.4 4.6  --  5.3 5.4* 4.1   CHLORIDE 110* 106  --   --  111* 109   CO2 23 20  --   --  21 23   BUN 25 19  --   --  15 24   CR 2.16* 1.96*  --   --  1.64* 1.71*   ANIONGAP 4 6  --   --  5 6   KEV 8.2* 8.2*  --   --  8.1* 8.2*   GLC 98 112*  --   --  123* 114*   ALBUMIN  --  2.7* 2.5*  --   --  2.9*   PROTTOTAL  --  6.3*  --   --   --  6.9   BILITOTAL  --  0.5  --   --   --  0.8   ALKPHOS  --  156*  --   --   --  165*   ALT  --  19  --   --   --  18   AST  --  28  --   --   --  33

## 2020-10-21 NOTE — PLAN OF CARE
DATE & TIME: 10/21/20 AM shift  Cognitive Concerns/ Orientation: A&Ox4   BEHAVIOR & AGGRESSION TOOL COLOR: Green, calm and cooperative. Denies suicidal ideations.   CIWA SCORE: n/a  ABNL VS/O2: VSS on RA, hypertensive 140s/70s.   MOBILITY: Up independently  PAIN MANAGMENT: C/o back pain, oxy given x1 with relief  DIET: 2g Na, good appetite, tolerating well  BOWEL/BLADDER: Continent.   ABNL LAB/BG: Cr 2.16; hg 9.1  DRAIN/DEVICES: PIV SL  SKIN: Bruising, skin tears (dressings changed)  D/C DAY/GOALS/PLACE:Pending clinical improvement and determination of commitment. Per last psych note, pt is hold able if decides to leave.   OTHER IMPORTANT INFO: Enteric isolation maintained, on oral Vanco for Cdiff. PT, OT, Psych, CD following.

## 2020-10-22 PROCEDURE — 120N000001 HC R&B MED SURG/OB

## 2020-10-22 PROCEDURE — 99207 PR CDG-CODE CATEGORY CHANGED: CPT | Performed by: PSYCHIATRY & NEUROLOGY

## 2020-10-22 PROCEDURE — 99232 SBSQ HOSP IP/OBS MODERATE 35: CPT | Performed by: STUDENT IN AN ORGANIZED HEALTH CARE EDUCATION/TRAINING PROGRAM

## 2020-10-22 PROCEDURE — 250N000013 HC RX MED GY IP 250 OP 250 PS 637: Performed by: STUDENT IN AN ORGANIZED HEALTH CARE EDUCATION/TRAINING PROGRAM

## 2020-10-22 PROCEDURE — 250N000011 HC RX IP 250 OP 636: Performed by: STUDENT IN AN ORGANIZED HEALTH CARE EDUCATION/TRAINING PROGRAM

## 2020-10-22 PROCEDURE — 99233 SBSQ HOSP IP/OBS HIGH 50: CPT | Performed by: PSYCHIATRY & NEUROLOGY

## 2020-10-22 PROCEDURE — 250N000013 HC RX MED GY IP 250 OP 250 PS 637: Performed by: INTERNAL MEDICINE

## 2020-10-22 RX ORDER — FUROSEMIDE 10 MG/ML
20 INJECTION INTRAMUSCULAR; INTRAVENOUS ONCE
Status: COMPLETED | OUTPATIENT
Start: 2020-10-22 | End: 2020-10-22

## 2020-10-22 RX ADMIN — VANCOMYCIN HYDROCHLORIDE 125 MG: 125 CAPSULE ORAL at 22:27

## 2020-10-22 RX ADMIN — VANCOMYCIN HYDROCHLORIDE 125 MG: 125 CAPSULE ORAL at 08:21

## 2020-10-22 RX ADMIN — TAMSULOSIN HYDROCHLORIDE 0.4 MG: 0.4 CAPSULE ORAL at 08:21

## 2020-10-22 RX ADMIN — SPIRONOLACTONE 25 MG: 25 TABLET, FILM COATED ORAL at 08:21

## 2020-10-22 RX ADMIN — QUETIAPINE FUMARATE 100 MG: 100 TABLET ORAL at 08:35

## 2020-10-22 RX ADMIN — POTASSIUM CHLORIDE 20 MEQ: 1500 TABLET, EXTENDED RELEASE ORAL at 21:04

## 2020-10-22 RX ADMIN — POTASSIUM CHLORIDE 20 MEQ: 1500 TABLET, EXTENDED RELEASE ORAL at 08:21

## 2020-10-22 RX ADMIN — LIDOCAINE 1 PATCH: 560 PATCH PERCUTANEOUS; TOPICAL; TRANSDERMAL at 21:04

## 2020-10-22 RX ADMIN — PANTOPRAZOLE SODIUM 40 MG: 40 TABLET, DELAYED RELEASE ORAL at 21:04

## 2020-10-22 RX ADMIN — MIRTAZAPINE 30 MG: 15 TABLET, FILM COATED ORAL at 22:26

## 2020-10-22 RX ADMIN — OXYCODONE HYDROCHLORIDE 5 MG: 5 TABLET ORAL at 06:45

## 2020-10-22 RX ADMIN — QUETIAPINE FUMARATE 100 MG: 100 TABLET ORAL at 17:43

## 2020-10-22 RX ADMIN — PANTOPRAZOLE SODIUM 40 MG: 40 TABLET, DELAYED RELEASE ORAL at 08:21

## 2020-10-22 RX ADMIN — VANCOMYCIN HYDROCHLORIDE 125 MG: 125 CAPSULE ORAL at 12:49

## 2020-10-22 RX ADMIN — HYDROXYZINE HYDROCHLORIDE 50 MG: 25 TABLET, FILM COATED ORAL at 10:30

## 2020-10-22 RX ADMIN — VANCOMYCIN HYDROCHLORIDE 125 MG: 125 CAPSULE ORAL at 17:43

## 2020-10-22 RX ADMIN — OXYCODONE HYDROCHLORIDE 5 MG: 5 TABLET ORAL at 14:57

## 2020-10-22 RX ADMIN — OXYCODONE HYDROCHLORIDE 5 MG: 5 TABLET ORAL at 22:27

## 2020-10-22 RX ADMIN — NICOTINE 1 PATCH: 21 PATCH, EXTENDED RELEASE TRANSDERMAL at 08:23

## 2020-10-22 RX ADMIN — VORTIOXETINE 10 MG: 10 TABLET, FILM COATED ORAL at 08:21

## 2020-10-22 RX ADMIN — ATENOLOL 25 MG: 25 TABLET ORAL at 08:21

## 2020-10-22 RX ADMIN — HYDROXYZINE HYDROCHLORIDE 50 MG: 25 TABLET, FILM COATED ORAL at 22:26

## 2020-10-22 RX ADMIN — MULTIPLE VITAMINS W/ MINERALS TAB 1 TABLET: TAB at 08:21

## 2020-10-22 RX ADMIN — FOLIC ACID 1 MG: 1 TABLET ORAL at 08:21

## 2020-10-22 RX ADMIN — FUROSEMIDE 20 MG: 10 INJECTION, SOLUTION INTRAVENOUS at 21:04

## 2020-10-22 RX ADMIN — FUROSEMIDE 10 MG: 20 TABLET ORAL at 08:21

## 2020-10-22 ASSESSMENT — ACTIVITIES OF DAILY LIVING (ADL)
ADLS_ACUITY_SCORE: 12

## 2020-10-22 NOTE — PLAN OF CARE
Admit since 10/14  DATE & TIME: 10/21/20 1452-8054  Cognitive Concerns/ Orientation: A&Ox4   BEHAVIOR & AGGRESSION TOOL COLOR: Green .   CIWA SCORE: n/a  ABNL VS/O2: VSS on RA  MOBILITY: Up independently, SBA in halls. He is holdable if tries to leave  PAIN MANAGMENT:Denies   DIET: 2g Na, good appetite, tolerating well  BOWEL/BLADDER: Continent.   ABNL LAB/BG: Cr 2.16; hgb 9.1  DRAIN/DEVICES: PIV SL  SKIN: Bruising, skin tears (dressings changed by AM nurse) edematous legs, L > R,   D/C DAY/GOALS/PLACE:Pending clinical improvement and determination of commitment. Per last psych note on 10/19, pt is holdable if decides to leave. New psych consult placed.  OTHER IMPORTANT INFO: Enteric isolation maintained, on oral Vancocin for Cdiff. Likely to complete soon. PT, OT, Psych, CD following. Pt signed release of information so Dr. Rand can communicate with doctors and nurses for plan.Denies suicidal ideations.   COMMIT TO SIT DONE AND SIGNED OFF: yes

## 2020-10-22 NOTE — PLAN OF CARE
Admit since 10/14  DATE & TIME: 10/21/20 3-11pm shift  Cognitive Concerns/ Orientation: A&Ox4   BEHAVIOR & AGGRESSION TOOL COLOR: Green/yellow upset when told about door alarm but calmed down once MD saw pt and increased anxiety medication availability. Pt signed release of information so Dr. Rand can communicate with doctors and nurses for plan.Denies suicidal ideations.   CIWA SCORE: n/a  ABNL VS/O2: VSS on RA, hypertensive at times  MOBILITY: Up independently, SBA in halls as is holdable if tries to leave  PAIN MANAGMENT: C/o back pain, 8/10. oxy given x1 with decrease in pain.  DIET: 2g Na, good appetite, tolerating well  BOWEL/BLADDER: Continent.   ABNL LAB/BG: Cr 2.16; hgb 9.1  DRAIN/DEVICES: PIV SL  SKIN: Bruising, skin tears (dressings changed by AM nurse) edematous legs, L > R, reports history of graft in this leg, left note for MD to assess.  D/C DAY/GOALS/PLACE:Pending clinical improvement and determination of commitment. Per last psych note on 10/19, pt is holdable if decides to leave. New psych consult placed.  OTHER IMPORTANT INFO: Enteric isolation maintained, on oral Vancocin for Cdiff. to complete on 10/24. PT, OT, Psych, CD following.

## 2020-10-22 NOTE — PROGRESS NOTES
[facility]    Hospitalist Progress Note    Date of Service: 10/22/2020    Assessment & Plan      Jim Richard is a 65 year old male was admitted on 10/14/2020 with PMH use disorder, cirrhosis, depression, recent hospitalization for intentional overdose, multiple hospitalization for intoxication who is being admitted again on 10/14/2020, due to continued drinking and suicidal ideation, stating that he would take all of his medications at once.  Recently hospitalized from 10/6 - 10/11 for alcohol intoxication with metabolic acidosis and C. difficile.  After discharge he states he stopped taking all of his medications and has been drinking because he does not care whether he lives or dies.    Depression, anxiety, suicidal ideation   Acute alcohol intoxication  Severe alcohol use disorder - multiple recent admission for intoxication at risk of life-threateningillness/death  Patient has been through treatment multiple times in the past. He states he stating drinking again right after discharge. BAL 0.36 at admission.    - CIWA stopped  - Psychiatry following  -> has initiated a commitment process due to his recidivism, inability to maintain sobriety, multiple admissions secondary to alcohol-related issues and expressing suicidal ideation while intoxicated.     -Continue PTA mirtazapine, trintellix, seroquel and Trazodone, Atarax as needed as well.   - Will reconsult with Psychiatry once the courts had made a determination regarding the petition for commitment or sooner as needed.     Alcoholic cirrhosis with history of esophageal varices and ascites - History of GI bleed.  Grade 1 esophageal varices with portal gastropathy. He c/o diffuse abdominal discomfort and has obvious fluid wave on exam with diffuse mild tenderness on exam. Last paracentesis on 10/9 was negative for SBP and patient currently refusing paracentesis stating pain no different now than prior to that procedure. S/p  paracentesis 10/19.  Plan:  - Resume lasix and spironolactone  - continue PPI   - continue PTA potassium BID  - continue PTA atenolol     Lactic acidosis secondary to above.  Hemodynamically stable with low suspicion for infection / sepsis  -DC IVF, taking in p.o.'s.    C. difficile colitis, diagnosed during his last admission CT A/P with circumferential wall thickening of rectum suggesting distal colitis/proctitis. procalcitonin low at 0.07. WBC 7.7. Afebrile.  Not taking vancomycin and currently having 2-3 loose stools a day on admission.  - oral vancomycin 125 mg every 6 hours with plan to continue to complete 10 more days as ordered at discharge.  Today formed stool.     Chronic kidney disease stage III  Cr 1.63, Baseline creatinine 1.4-1.9.      Chronic anemia, nl MCV, stable  - hgb 10.2      BPH  -Continue flomax     JANIS,   - home CPAP ordered        DVT Prophylaxis: Pneumatic Compression Devices  Code Status: Full Code  Expected discharge: TBD pending Psychiatry and commitment process      Chivo Murcia MD  Text Page (7am - 6pm, M-F)    Interval History      No acute events overnight  Would like to not pursue commitment and reports he had good discussion with psychiatry today.   No CP/SOB tody.   No fevers or chills  Today patient has no ideation or marks of self-harm.  No tremors, anxiety, afebrile    Physical Exam   Temp: 98.3  F (36.8  C) Temp src: Oral BP: 126/68 Pulse: 55   Resp: 16 SpO2: 96 % O2 Device: None (Room air)    Vitals:    10/18/20 0545   Weight: 87.1 kg (192 lb 1.6 oz)     Vital Signs with Ranges  Temp:  [98.3  F (36.8  C)-99.5  F (37.5  C)] 98.3  F (36.8  C)  Pulse:  [50-55] 55  Resp:  [16-18] 16  BP: (126-137)/(64-69) 126/68  SpO2:  [95 %-96 %] 96 %  No intake/output data recorded.    General/Constitutional:  No acute distress, alert, calm, cooperative  Chest/Respiratory:  CTABL  Cardiovascular: No murmur appreciated.    Gastrointestinal/Abdomen:  soft, nontender, mild  ascites  Musculoskeletal:  extremities warm, dry, noncyanotic; no acitve synovitis.  Neurologic: BADILLO and A/Ox3  Psychiatric:  Mental status:  Alert, oriented, affect calm, no ideation or remarks of self-harm.  Skin:  No rash noted on gross exam    Medications     - MEDICATION INSTRUCTIONS -         atenolol  25 mg Oral Daily     folic acid  1 mg Oral Daily     furosemide  10 mg Oral Daily     lidocaine  1 patch Transdermal Q24h    And     lidocaine   Transdermal Q8H     [Held by provider] magnesium oxide  400 mg Oral Daily     mirtazapine  30 mg Oral At Bedtime     multivitamin w/minerals  1 tablet Oral Daily     nicotine  1 patch Transdermal Daily     nicotine   Transdermal Q8H     pantoprazole  40 mg Oral BID     potassium chloride ER  20 mEq Oral BID     sodium chloride (PF)  3 mL Intracatheter Q8H     spironolactone  25 mg Oral Daily     tamsulosin  0.4 mg Oral Daily     vancomycin  125 mg Oral 4x Daily     vortioxetine  10 mg Oral Daily       Data   Recent Labs   Lab 10/21/20  0912 10/19/20  1007 10/18/20  1537 10/18/20  0314 10/17/20  1402   WBC 5.1 6.0  --   --   --    HGB 9.1* 8.6*  --   --   --    MCV 92 92  --   --   --     153  --   --   --    INR  --  1.37*  --   --   --     132*  --   --  137   POTASSIUM 4.4 4.6  --  5.3 5.4*   CHLORIDE 110* 106  --   --  111*   CO2 23 20  --   --  21   BUN 25 19  --   --  15   CR 2.16* 1.96*  --   --  1.64*   ANIONGAP 4 6  --   --  5   KEV 8.2* 8.2*  --   --  8.1*   GLC 98 112*  --   --  123*   ALBUMIN  --  2.7* 2.5*  --   --    PROTTOTAL  --  6.3*  --   --   --    BILITOTAL  --  0.5  --   --   --    ALKPHOS  --  156*  --   --   --    ALT  --  19  --   --   --    AST  --  28  --   --   --

## 2020-10-22 NOTE — CONSULTS
[facility]  Psychiatry Consultation - Follow-up note      Interim History:   Follow-up requested by the patient today.  The patient was last seen by psychiatry on October 19.  No acute issues overnight.    On examination today, the patient reports that he is doing well.  His mood has improved, characterizing mild depressive symptoms, and denied suicidal or homicidal thoughts.  He seemed content with his current antidepressants and dosing.  Higher doses of Trintellix previously resulted in excessive sedation.    He spent some time today questioning why a petition of commitment was being pursued.  I reviewed with him the concerns that we previously discussed during our last meeting on October 15.  He proceeded to advocate for himself, stating that it should be his right to refuse chemical dependency treatment.  He explained that during his last commitment, he agreed with the treatment recommendations however was not genuine regarding his desire for sobriety.  He continues to lack a desire for total abstinence from alcohol.  As he politely engaged in the debate, he offered an example, comparing his situation to a person who was diagnosed with cancer and is refusing treatment.  He explains that he is of sound mind, understands the consequences if he were to continue drinking alcohol, however desires to continue doing so.  We discussed that he will soon have the opportunity to express his viewpoint in front of a  as my recommendation to him is to abstain from alcohol use to avoid life-threatening consequences.           Medications:       atenolol  25 mg Oral Daily     folic acid  1 mg Oral Daily     furosemide  10 mg Oral Daily     lidocaine  1 patch Transdermal Q24h    And     lidocaine   Transdermal Q8H     [Held by provider] magnesium oxide  400 mg Oral Daily     mirtazapine  30 mg Oral At Bedtime     multivitamin w/minerals  1 tablet Oral Daily     nicotine  1 patch Transdermal Daily      nicotine   Transdermal Q8H     pantoprazole  40 mg Oral BID     potassium chloride ER  20 mEq Oral BID     sodium chloride (PF)  3 mL Intracatheter Q8H     spironolactone  25 mg Oral Daily     tamsulosin  0.4 mg Oral Daily     vancomycin  125 mg Oral 4x Daily     vortioxetine  10 mg Oral Daily          Allergies:     Allergies   Allergen Reactions     Amlodipine Swelling     Lisinopril      Other reaction(s): Angioedema  Mouth and tongue swelling   Mouth and tongue swelling             Labs:   No results found for this or any previous visit (from the past 24 hour(s)).       Psychiatric Examination:     /68   Pulse 55   Temp 98.3  F (36.8  C) (Oral)   Resp 16   Wt 87.1 kg (192 lb 1.6 oz)   SpO2 96%   BMI 30.09 kg/m    Weight is 192 lbs 1.6 oz  Body mass index is 30.09 kg/m .  Orthostatic Vitals     None            Appearance: awake, alert  Attitude:  cooperative  Eye Contact:  fair  Mood:  better  Affect:  appropriate and in normal range  Speech:  clear, coherent  Psychomotor Behavior:  tremor observed   Throught Process:  logical and linear  Associations:  no loose associations  Thought Content:  no evidence of suicidal ideation or homicidal ideation and no evidence of psychotic thought  Insight:  fair  Judgement:  intact  Oriented to:  time, person, and place  Attention Span and Concentration:  fair  Recent and Remote Memory:  intact           DIagnoses:     Alcohol use disorder, severe  Alcohol withdrawal  Major depressive disorder-recurrent, severe without psychotic features  Hepatic cirrhosis with ascites and esophageal varices  BPH  Chronic kidney disease  C. difficile colitis      Recommendations:     Continue Trintellix at the current dose for treatment of a depressive disorder.  His mood continues to improve and he is denying suicidal ideation today.    He was educated regarding what to expect from the upcoming court proceedings.  He plans to refuse chemical dependency treatments as he does  not desire total abstinence from alcohol.    Chemical dependency consultation noted: Patient be reconsulted if the courts decide to support involuntary commitment for chemical dependency treatment.  The patient will likely be limited to hospital-based programs for treatment due to his funding source.    The most ideal and optimal disposition plan involves residential MI/CD treatment.  If through the upcoming court proceedings, the patient is determined as having the right to refuse the recommended treatment despite the risk of life-threatening consequences, his disposition plan can then be revisited.    Continue to maintain a door alarm to minimize the risk of elopement while under a court hold.    Please reconsult with Psychiatry once the courts had made a determination regarding the petition for commitment or sooner as needed.  Jeff Brito MD   Text Page

## 2020-10-22 NOTE — PLAN OF CARE
PT- Attempted to see pt this AM, pt sitting up in chair. Pt declined any ambulation or participation with therapy. Encouraged pt to ambulate with nursing staff throughout the day, pt agreeable.

## 2020-10-22 NOTE — PROGRESS NOTES
Care Management Follow Up Note    Length of Stay (days) 8    Patient plan of care discussed at Interdisciplinary Rounds: yes  Expected Discharge Date: TBD  Concerns to be Addressed:         Anticipated Discharge Disposition:  TBD  Anticipated Discharge Services:  TBD  Anticipated Discharge DME:  TBD    Plan:  Phone call from Kathie with [facility], [phone]. Pt is not on a hold, but if this changes, she should be notified. No date for a hearing yet. She is in the process of finalizing the paperwork.    JULIA John, LGSW  [phone]  [facility]

## 2020-10-23 PROCEDURE — 99231 SBSQ HOSP IP/OBS SF/LOW 25: CPT | Performed by: STUDENT IN AN ORGANIZED HEALTH CARE EDUCATION/TRAINING PROGRAM

## 2020-10-23 PROCEDURE — 250N000013 HC RX MED GY IP 250 OP 250 PS 637: Performed by: INTERNAL MEDICINE

## 2020-10-23 PROCEDURE — 250N000013 HC RX MED GY IP 250 OP 250 PS 637: Performed by: STUDENT IN AN ORGANIZED HEALTH CARE EDUCATION/TRAINING PROGRAM

## 2020-10-23 PROCEDURE — 120N000001 HC R&B MED SURG/OB

## 2020-10-23 RX ADMIN — VORTIOXETINE 10 MG: 10 TABLET, FILM COATED ORAL at 08:08

## 2020-10-23 RX ADMIN — QUETIAPINE FUMARATE 100 MG: 100 TABLET ORAL at 04:51

## 2020-10-23 RX ADMIN — FOLIC ACID 1 MG: 1 TABLET ORAL at 08:08

## 2020-10-23 RX ADMIN — QUETIAPINE FUMARATE 100 MG: 100 TABLET ORAL at 19:58

## 2020-10-23 RX ADMIN — OXYCODONE HYDROCHLORIDE 5 MG: 5 TABLET ORAL at 08:19

## 2020-10-23 RX ADMIN — VANCOMYCIN HYDROCHLORIDE 125 MG: 125 CAPSULE ORAL at 08:08

## 2020-10-23 RX ADMIN — POTASSIUM CHLORIDE 20 MEQ: 1500 TABLET, EXTENDED RELEASE ORAL at 08:08

## 2020-10-23 RX ADMIN — POTASSIUM CHLORIDE 20 MEQ: 1500 TABLET, EXTENDED RELEASE ORAL at 21:43

## 2020-10-23 RX ADMIN — HYDROXYZINE HYDROCHLORIDE 50 MG: 25 TABLET, FILM COATED ORAL at 04:45

## 2020-10-23 RX ADMIN — QUETIAPINE 25 MG: 25 TABLET ORAL at 01:31

## 2020-10-23 RX ADMIN — VANCOMYCIN HYDROCHLORIDE 125 MG: 125 CAPSULE ORAL at 21:43

## 2020-10-23 RX ADMIN — OXYCODONE HYDROCHLORIDE 5 MG: 5 TABLET ORAL at 15:16

## 2020-10-23 RX ADMIN — MULTIPLE VITAMINS W/ MINERALS TAB 1 TABLET: TAB at 08:08

## 2020-10-23 RX ADMIN — FUROSEMIDE 10 MG: 20 TABLET ORAL at 08:07

## 2020-10-23 RX ADMIN — OXYCODONE HYDROCHLORIDE 5 MG: 5 TABLET ORAL at 21:43

## 2020-10-23 RX ADMIN — HYDROXYZINE HYDROCHLORIDE 50 MG: 25 TABLET, FILM COATED ORAL at 11:35

## 2020-10-23 RX ADMIN — VANCOMYCIN HYDROCHLORIDE 125 MG: 125 CAPSULE ORAL at 13:20

## 2020-10-23 RX ADMIN — VANCOMYCIN HYDROCHLORIDE 125 MG: 125 CAPSULE ORAL at 17:36

## 2020-10-23 RX ADMIN — PANTOPRAZOLE SODIUM 40 MG: 40 TABLET, DELAYED RELEASE ORAL at 21:43

## 2020-10-23 RX ADMIN — NICOTINE 1 PATCH: 21 PATCH, EXTENDED RELEASE TRANSDERMAL at 08:09

## 2020-10-23 RX ADMIN — SPIRONOLACTONE 25 MG: 25 TABLET, FILM COATED ORAL at 08:08

## 2020-10-23 RX ADMIN — HYDROXYZINE HYDROCHLORIDE 50 MG: 25 TABLET, FILM COATED ORAL at 17:25

## 2020-10-23 RX ADMIN — ATENOLOL 25 MG: 25 TABLET ORAL at 08:07

## 2020-10-23 RX ADMIN — PANTOPRAZOLE SODIUM 40 MG: 40 TABLET, DELAYED RELEASE ORAL at 08:09

## 2020-10-23 RX ADMIN — LIDOCAINE 1 PATCH: 560 PATCH PERCUTANEOUS; TOPICAL; TRANSDERMAL at 19:58

## 2020-10-23 RX ADMIN — MIRTAZAPINE 30 MG: 15 TABLET, FILM COATED ORAL at 21:43

## 2020-10-23 RX ADMIN — TAMSULOSIN HYDROCHLORIDE 0.4 MG: 0.4 CAPSULE ORAL at 08:08

## 2020-10-23 ASSESSMENT — ACTIVITIES OF DAILY LIVING (ADL)
ADLS_ACUITY_SCORE: 12
ADLS_ACUITY_SCORE: 11
ADLS_ACUITY_SCORE: 12
ADLS_ACUITY_SCORE: 11

## 2020-10-23 NOTE — PLAN OF CARE
DATE & TIME: 10/23/20  7-3pm  Cognitive Concerns/ Orientation: A&Ox4   BEHAVIOR & AGGRESSION TOOL COLOR: Green .   CIWA SCORE: n/a  ABNL VS/O2: VSS on RA  MOBILITY: Up independently, SBA in halls. He is holdable if tries to leave  PAIN MANAGMENT:Denies   DIET: 2g Na, good appetite, tolerating well  BOWEL/BLADDER: Continent.   ABNL LAB/BG: NA  DRAIN/DEVICES: PIV SL  SKIN: Bruising, skin tears (dressings changed by AM nurse) edematous legs +1 L > R,   D/C DAY/GOALS/PLACE:Pending clinical improvement and determination of commitment. Per last psych note on 10/19, pt is holdable if decides to leave. New psych consult placed.  OTHER IMPORTANT INFO: Enteric isolation maintained, on oral Vancocin for Cdiff. Likely to complete soon. PT, OT, Psych, CD following.Door alarm. Denies suicidal ideations.

## 2020-10-23 NOTE — PROGRESS NOTES
RN [id number]: Patient A&OX4, up independently in room, asked for atarax/given & oxycodone for back pain.Patient vss, tolerating diet. Pt stated he has had 2 BM's today/normal. Door alarm on, enteric isolation maintained.

## 2020-10-23 NOTE — PROGRESS NOTES
[facility]    Hospitalist Progress Note    Date of Service: 10/23/2020    Assessment & Plan      Jim Richard is a 65 year old male was admitted on 10/14/2020 with PMH use disorder, cirrhosis, depression, recent hospitalization for intentional overdose, multiple hospitalization for intoxication who is being admitted again on 10/14/2020, due to continued drinking and suicidal ideation, stating that he would take all of his medications at once.  Recently hospitalized from 10/6 - 10/11 for alcohol intoxication with metabolic acidosis and C. difficile.  After discharge he states he stopped taking all of his medications and has been drinking because he does not care whether he lives or dies.    Depression, anxiety, suicidal ideation   Acute alcohol intoxication  Severe alcohol use disorder - multiple recent admission for intoxication at risk of life-threateningillness/death  Patient has been through treatment multiple times in the past. He states he stating drinking again right after discharge. BAL 0.36 at admission.    - CIWA stopped  - Psychiatry was consulted-> has initiated a commitment process due to his recidivism, inability to maintain sobriety, multiple admissions secondary to alcohol-related issues and expressing suicidal ideation while intoxicated.     -Continue PTA mirtazapine, trintellix, seroquel and Trazodone, Atarax as needed as well.   - Will reconsult with Psychiatry once the courts had made a determination regarding the petition for commitment or sooner as needed.     Alcoholic cirrhosis with history of esophageal varices and ascites - History of GI bleed.  Grade 1 esophageal varices with portal gastropathy. He c/o diffuse abdominal discomfort and has obvious fluid wave on exam with diffuse mild tenderness on exam. Last paracentesis on 10/9 was negative for SBP and patient currently refusing paracentesis stating pain no different now than prior to that procedure. S/p  paracentesis 10/19.  Plan:  - Resume lasix and spironolactone  - continue PPI   - continue PTA potassium BID  - continue PTA atenolol     Lactic acidosis secondary to above.  Hemodynamically stable with low suspicion for infection / sepsis  -DC IVF, taking in p.o.'s.    C. difficile colitis, diagnosed during his last admission CT A/P with circumferential wall thickening of rectum suggesting distal colitis/proctitis. procalcitonin low at 0.07. WBC 7.7. Afebrile.  Not taking vancomycin and currently having 2-3 loose stools a day on admission.  - oral vancomycin 125 mg every 6 hours with plan to continue to complete 10 more days as ordered at discharge.  Today formed stool.     Chronic kidney disease stage III  Cr 1.63, Baseline creatinine 1.4-1.9.      Chronic anemia, nl MCV, stable  - hgb 10.2      BPH  -Continue flomax     JANIS,   - home CPAP ordered        DVT Prophylaxis: Pneumatic Compression Devices  Code Status: Full Code  Expected discharge: TBD pending Psychiatry and commitment process      Chivo Murcia MD  Text Page (7am - 6pm, M-F)    Interval History      No acute events overnight  No CP/SOB tody.   No fevers or chills  Today patient has no ideation or marks of self-harm.  No tremors, anxiety, afebrile    Physical Exam   Temp: 98.7  F (37.1  C) Temp src: Oral BP: 116/57 Pulse: 55   Resp: 16 SpO2: 97 % O2 Device: None (Room air)    Vitals:    10/18/20 0545 10/23/20 0500   Weight: 87.1 kg (192 lb 1.6 oz) 80 kg (176 lb 6.4 oz)     Vital Signs with Ranges  Temp:  [98.5  F (36.9  C)-98.7  F (37.1  C)] 98.7  F (37.1  C)  Pulse:  [55-60] 55  Resp:  [16] 16  BP: (116-140)/(57-71) 116/57  SpO2:  [92 %-97 %] 97 %  I/O last 3 completed shifts:  In: 400 [P.O.:400]  Out: 1100 [Urine:1100]    General/Constitutional:  No acute distress, alert, calm, cooperative  Chest/Respiratory:  CTABL  Cardiovascular: No murmur appreciated.    Gastrointestinal/Abdomen:  soft, nontender, mild ascites  Musculoskeletal:  extremities warm,  dry, noncyanotic; no acitve synovitis.  Neurologic: BADILLO and A/Ox3  Psychiatric:  Mental status:  Alert, oriented, affect calm, no ideation or remarks of self-harm.  Skin:  No rash noted on gross exam    Medications     - MEDICATION INSTRUCTIONS -         atenolol  25 mg Oral Daily     folic acid  1 mg Oral Daily     furosemide  10 mg Oral Daily     lidocaine  1 patch Transdermal Q24h    And     lidocaine   Transdermal Q8H     magnesium oxide  400 mg Oral Daily     mirtazapine  30 mg Oral At Bedtime     multivitamin w/minerals  1 tablet Oral Daily     nicotine  1 patch Transdermal Daily     nicotine   Transdermal Q8H     pantoprazole  40 mg Oral BID     potassium chloride ER  20 mEq Oral BID     sodium chloride (PF)  3 mL Intracatheter Q8H     spironolactone  25 mg Oral Daily     tamsulosin  0.4 mg Oral Daily     vancomycin  125 mg Oral 4x Daily     vortioxetine  10 mg Oral Daily       Data   Recent Labs   Lab 10/21/20  0912 10/19/20  1007 10/18/20  1537 10/18/20  0314 10/17/20  1402   WBC 5.1 6.0  --   --   --    HGB 9.1* 8.6*  --   --   --    MCV 92 92  --   --   --     153  --   --   --    INR  --  1.37*  --   --   --     132*  --   --  137   POTASSIUM 4.4 4.6  --  5.3 5.4*   CHLORIDE 110* 106  --   --  111*   CO2 23 20  --   --  21   BUN 25 19  --   --  15   CR 2.16* 1.96*  --   --  1.64*   ANIONGAP 4 6  --   --  5   KEV 8.2* 8.2*  --   --  8.1*   GLC 98 112*  --   --  123*   ALBUMIN  --  2.7* 2.5*  --   --    PROTTOTAL  --  6.3*  --   --   --    BILITOTAL  --  0.5  --   --   --    ALKPHOS  --  156*  --   --   --    ALT  --  19  --   --   --    AST  --  28  --   --   --

## 2020-10-23 NOTE — PLAN OF CARE
"DATE & TIME: 10/22/20 [id number]  Cognitive Concerns/ Orientation: A&Ox4   BEHAVIOR & AGGRESSION TOOL COLOR: Green .   CIWA SCORE: n/a  ABNL VS/O2: VSS on RA  MOBILITY: Up independently, SBA in halls. He is holdable if tries to leave - door alarm on  PAIN MANAGMENT: Denies   DIET: 2g Na+, good appetite, tolerating well  BOWEL/BLADDER: Continent.   ABNL LAB/BG: NA  DRAIN/DEVICES: PIV SL  SKIN: Bruising, skin tears (dressings changed); edematous legs, L > R - lasix ordered x1 dose  D/C DAY/GOALS/PLACE:  Per Psych, \"The most ideal and optimal disposition plan involves residential MI/CD treatment.  If through the upcoming court proceedings, the patient is determined as having the right to refuse the recommended treatment despite the risk of life-threatening consequences, his disposition plan can then be revisited.\"  SW assisting; [facility] is in the process of finalizing paperwork.  OTHER IMPORTANT INFO: Enteric isolation maintained, on oral Vancocin for Cdiff.  PT, OT, Psych, CD following.  Denies suicidal ideations.     "

## 2020-10-23 NOTE — PLAN OF CARE
"DATE & TIME: 10/22/20 1930 to 2330  Cognitive Concerns/ Orientation: A&Ox4   BEHAVIOR & AGGRESSION TOOL COLOR: Green .   CIWA SCORE: n/a  ABNL VS/O2: VSS on RA  MOBILITY: Up independently, SBA in halls. He is holdable if tries to leave - door alarm on  PAIN MANAGMENT: Denies   DIET: 2g Na+, good appetite, tolerating well  BOWEL/BLADDER: Continent.   ABNL LAB/BG: NA  DRAIN/DEVICES: PIV SL  SKIN: Bruising, skin tears (dressings changed); edematous legs, L > R - lasix ordered x1 dose  D/C DAY/GOALS/PLACE:  Per Psych, \"The most ideal and optimal disposition plan involves residential MI/CD treatment.  If through the upcoming court proceedings, the patient is determined as having the right to refuse the recommended treatment despite the risk of life-threatening consequences, his disposition plan can then be revisited.\"  SW assisting; [facility] is in the process of finalizing paperwork.  OTHER IMPORTANT INFO: Enteric isolation maintained, on oral Vancocin for Cdiff.  PT, OT, Psych, CD following.  Denies suicidal ideations  "

## 2020-10-24 LAB
ANION GAP SERPL CALCULATED.3IONS-SCNC: 6 MMOL/L (ref 3–14)
BUN SERPL-MCNC: 32 MG/DL (ref 7–30)
CALCIUM SERPL-MCNC: 8.4 MG/DL (ref 8.5–10.1)
CHLORIDE SERPL-SCNC: 104 MMOL/L (ref 94–109)
CO2 SERPL-SCNC: 23 MMOL/L (ref 20–32)
CREAT SERPL-MCNC: 2.29 MG/DL (ref 0.66–1.25)
GFR SERPL CREATININE-BSD FRML MDRD: 29 ML/MIN/{1.73_M2}
GLUCOSE SERPL-MCNC: 83 MG/DL (ref 70–99)
POTASSIUM SERPL-SCNC: 4.9 MMOL/L (ref 3.4–5.3)
SODIUM SERPL-SCNC: 133 MMOL/L (ref 133–144)

## 2020-10-24 PROCEDURE — 250N000013 HC RX MED GY IP 250 OP 250 PS 637: Performed by: STUDENT IN AN ORGANIZED HEALTH CARE EDUCATION/TRAINING PROGRAM

## 2020-10-24 PROCEDURE — 250N000013 HC RX MED GY IP 250 OP 250 PS 637: Performed by: INTERNAL MEDICINE

## 2020-10-24 PROCEDURE — 120N000001 HC R&B MED SURG/OB

## 2020-10-24 PROCEDURE — 80048 BASIC METABOLIC PNL TOTAL CA: CPT | Performed by: INTERNAL MEDICINE

## 2020-10-24 PROCEDURE — 36415 COLL VENOUS BLD VENIPUNCTURE: CPT | Performed by: INTERNAL MEDICINE

## 2020-10-24 PROCEDURE — 99232 SBSQ HOSP IP/OBS MODERATE 35: CPT | Performed by: INTERNAL MEDICINE

## 2020-10-24 RX ADMIN — HYDROXYZINE HYDROCHLORIDE 50 MG: 25 TABLET, FILM COATED ORAL at 00:06

## 2020-10-24 RX ADMIN — VANCOMYCIN HYDROCHLORIDE 125 MG: 125 CAPSULE ORAL at 22:56

## 2020-10-24 RX ADMIN — ATENOLOL 25 MG: 25 TABLET ORAL at 08:21

## 2020-10-24 RX ADMIN — MAGNESIUM OXIDE 400 MG: 400 TABLET ORAL at 08:21

## 2020-10-24 RX ADMIN — HYDROXYZINE HYDROCHLORIDE 50 MG: 25 TABLET, FILM COATED ORAL at 09:49

## 2020-10-24 RX ADMIN — OXYCODONE HYDROCHLORIDE 5 MG: 5 TABLET ORAL at 22:56

## 2020-10-24 RX ADMIN — TAMSULOSIN HYDROCHLORIDE 0.4 MG: 0.4 CAPSULE ORAL at 08:21

## 2020-10-24 RX ADMIN — QUETIAPINE FUMARATE 100 MG: 100 TABLET ORAL at 12:11

## 2020-10-24 RX ADMIN — VANCOMYCIN HYDROCHLORIDE 125 MG: 125 CAPSULE ORAL at 14:50

## 2020-10-24 RX ADMIN — PANTOPRAZOLE SODIUM 40 MG: 40 TABLET, DELAYED RELEASE ORAL at 20:08

## 2020-10-24 RX ADMIN — HYDROXYZINE HYDROCHLORIDE 25 MG: 25 TABLET, FILM COATED ORAL at 22:56

## 2020-10-24 RX ADMIN — FUROSEMIDE 10 MG: 20 TABLET ORAL at 08:21

## 2020-10-24 RX ADMIN — MIRTAZAPINE 30 MG: 15 TABLET, FILM COATED ORAL at 22:56

## 2020-10-24 RX ADMIN — FOLIC ACID 1 MG: 1 TABLET ORAL at 08:21

## 2020-10-24 RX ADMIN — NICOTINE 1 PATCH: 21 PATCH, EXTENDED RELEASE TRANSDERMAL at 08:20

## 2020-10-24 RX ADMIN — VORTIOXETINE 10 MG: 10 TABLET, FILM COATED ORAL at 08:20

## 2020-10-24 RX ADMIN — OXYCODONE HYDROCHLORIDE 5 MG: 5 TABLET ORAL at 04:45

## 2020-10-24 RX ADMIN — PANTOPRAZOLE SODIUM 40 MG: 40 TABLET, DELAYED RELEASE ORAL at 08:20

## 2020-10-24 RX ADMIN — QUETIAPINE 25 MG: 25 TABLET ORAL at 00:06

## 2020-10-24 RX ADMIN — TRAZODONE HYDROCHLORIDE 100 MG: 100 TABLET ORAL at 23:28

## 2020-10-24 RX ADMIN — POTASSIUM CHLORIDE 20 MEQ: 1500 TABLET, EXTENDED RELEASE ORAL at 20:08

## 2020-10-24 RX ADMIN — HYDROXYZINE HYDROCHLORIDE 50 MG: 25 TABLET, FILM COATED ORAL at 17:56

## 2020-10-24 RX ADMIN — SPIRONOLACTONE 25 MG: 25 TABLET, FILM COATED ORAL at 08:21

## 2020-10-24 RX ADMIN — LIDOCAINE 1 PATCH: 560 PATCH PERCUTANEOUS; TOPICAL; TRANSDERMAL at 20:08

## 2020-10-24 RX ADMIN — MULTIPLE VITAMINS W/ MINERALS TAB 1 TABLET: TAB at 08:21

## 2020-10-24 RX ADMIN — VANCOMYCIN HYDROCHLORIDE 125 MG: 125 CAPSULE ORAL at 19:04

## 2020-10-24 RX ADMIN — OXYCODONE HYDROCHLORIDE 5 MG: 5 TABLET ORAL at 15:04

## 2020-10-24 RX ADMIN — POTASSIUM CHLORIDE 20 MEQ: 1500 TABLET, EXTENDED RELEASE ORAL at 08:21

## 2020-10-24 RX ADMIN — VANCOMYCIN HYDROCHLORIDE 125 MG: 125 CAPSULE ORAL at 08:21

## 2020-10-24 ASSESSMENT — ACTIVITIES OF DAILY LIVING (ADL)
ADLS_ACUITY_SCORE: 11

## 2020-10-24 NOTE — PROGRESS NOTES
[facility]    Hospitalist Progress Note    Date of Service (when I saw the patient): 10/24/2020    Assessment & Plan   Jim Richard is a 65 year old gentleman with substance use disorder, cirrhosis, depression, recent hospitalization for intentional overdose, and multiple hospitalizations for intoxication who was admitted again on 10/14/2020, due to continued drinking and suicidal ideation.  Recently hospitalized from 10/6 - 10/11 for alcohol intoxication with metabolic acidosis and C. difficile.  Current problems include:     Depression, anxiety, suicidal ideation   Acute alcohol intoxication  Severe alcohol use disorder - multiple recent admission for intoxication at risk of life-threateningillness/death  Patient has been through treatment multiple times in the past. He states he stating drinking again right after discharge. BAL 0.36 at admission.    - BRICEWA stopped  - Psychiatry involved --> has initiated a commitment process due to his recidivism, inability to maintain sobriety, multiple admissions secondary to alcohol-related issues and expressing suicidal ideation while intoxicated.    - Continue PTA mirtazapine, trintellix, seroquel and Trazodone, Atarax as needed as well.   - Will reconsult with Psychiatry once the courts had made a determination regarding the petition for commitment or sooner as needed.     Alcoholic cirrhosis with history of esophageal varices and ascites - History of GI bleed.  Grade 1 esophageal varices with portal gastropathy. He c/o diffuse abdominal discomfort and has obvious fluid wave on exam with diffuse mild tenderness on exam. Last paracentesis on 10/9 was negative for SBP and patient currently refusing paracentesis stating pain no different now than prior to that procedure. S/p paracentesis 10/19.  Plan:  - continue lasix and spironolactone  - continue PPI   - continue PTA potassium BID  - continue PTA atenolol     Poor PO intake; concerned about  possible malnutrition.  - RD consult --> Non-Severe malnutrition  In Context of:  Chronic illness or disease, Environmental or social circumstances     Lactic acidosis secondary to above.  Hemodynamically stable with low suspicion for infection / sepsis  - continue current monitoring     C. difficile colitis, diagnosed during his last admission CT A/P with circumferential wall thickening of rectum suggesting distal colitis/proctitis. procalcitonin low at 0.07. WBC 7.7. Afebrile.  BM's mainly formed now.  - oral vancomycin 125 mg every 6 hours with plan to continue to complete 10 more days as ordered at discharge.     NGUYEN in setting of Chronic kidney disease stage III  Cr 1.63, Baseline creatinine 1.4-1.9.   - repeat BMP 10/25     Chronic anemia, nl MCV, stable  - hgb 10.2      BPH  -Continue flomax     JANIS,   - home CPAP ordered        DVT Prophylaxis: Pneumatic Compression Devices  Code Status: Full Code  Expected discharge: TBD pending Psychiatry and commitment process      DUANE Meier MD, [facility]     Internal Medicine Hospitalist  Text Page (7am - 6pm)    Interval History   No new issues; still having intermittent back pain, responding to current analgesics.  BM's now formed.    Data reviewed today: I reviewed all new labs and imaging results over the last 24 hours. I personally reviewed all recent labs/imaging results.    Physical Exam   Temp: 99.1  F (37.3  C) Temp src: Oral BP: 117/72 Pulse: 56   Resp: 16 SpO2: 94 % O2 Device: None (Room air)    Vitals:    10/18/20 0545 10/23/20 0500   Weight: 87.1 kg (192 lb 1.6 oz) 80 kg (176 lb 6.4 oz)     Vital Signs with Ranges  Temp:  [98.2  F (36.8  C)-99.1  F (37.3  C)] 99.1  F (37.3  C)  Pulse:  [51-56] 56  Resp:  [16] 16  BP: (116-142)/(57-72) 117/72  SpO2:  [94 %-97 %] 94 %  I/O last 3 completed shifts:  In: 800 [P.O.:800]  Out: 300 [Urine:300]    Constitutional: awake, no apparent distress; lying in bed  HEENT: sclerae clear; MM's moist  Respiratory: good a/e  bilaterally, no wheezing or rhonchi  Cardiovascular: Regular rate and rhythm, S1, S2 noted; no m/r/g  GI: abdomen flat, + bowel sounds; soft, non-tender, non-distended  Skin/Integumen: no rashes, no cyanosis, no jaundice  Musculoskeletal: no edema  Neurologic: follows directions well; no focal deficits      Medications     - MEDICATION INSTRUCTIONS -         atenolol  25 mg Oral Daily     folic acid  1 mg Oral Daily     furosemide  10 mg Oral Daily     lidocaine  1 patch Transdermal Q24h    And     lidocaine   Transdermal Q8H     magnesium oxide  400 mg Oral Daily     mirtazapine  30 mg Oral At Bedtime     multivitamin w/minerals  1 tablet Oral Daily     nicotine  1 patch Transdermal Daily     nicotine   Transdermal Q8H     pantoprazole  40 mg Oral BID     potassium chloride ER  20 mEq Oral BID     sodium chloride (PF)  3 mL Intracatheter Q8H     spironolactone  25 mg Oral Daily     tamsulosin  0.4 mg Oral Daily     vancomycin  125 mg Oral 4x Daily     vortioxetine  10 mg Oral Daily       Data   Recent Labs   Lab 10/21/20  0912 10/19/20  1007 10/18/20  1537 10/18/20  0314 10/17/20  1402   WBC 5.1 6.0  --   --   --    HGB 9.1* 8.6*  --   --   --    MCV 92 92  --   --   --     153  --   --   --    INR  --  1.37*  --   --   --     132*  --   --  137   POTASSIUM 4.4 4.6  --  5.3 5.4*   CHLORIDE 110* 106  --   --  111*   CO2 23 20  --   --  21   BUN 25 19  --   --  15   CR 2.16* 1.96*  --   --  1.64*   ANIONGAP 4 6  --   --  5   KEV 8.2* 8.2*  --   --  8.1*   GLC 98 112*  --   --  123*   ALBUMIN  --  2.7* 2.5*  --   --    PROTTOTAL  --  6.3*  --   --   --    BILITOTAL  --  0.5  --   --   --    ALKPHOS  --  156*  --   --   --    ALT  --  19  --   --   --    AST  --  28  --   --   --

## 2020-10-24 NOTE — CONSULTS
"CLINICAL NUTRITION SERVICES BRIEF NOTE  Consult received - \"Please eval. re. nutritional status/risk; chronic Et0h use.\"    - RD following. Refer to notes dated 10/16 for nutrition history and 10/21 for reassessment.   - Will continue to follow per policy.     Malnutrition: (10/16)  % Weight Loss:  Weight loss does not meet criteria for malnutrition   % Intake:</= 75% for >/= 1 month (severe malnutrition)   Subcutaneous Fat Loss:  Orbital region mild depletion and Upper arm region mild depletion  Muscle Loss:  Temporal region mild depletion, Clavicle bone region mild depletion and Dorsal hand region mild depletion  Fluid Retention:  None noted     Malnutrition Diagnosis: Non-Severe malnutrition  In Context of:  Chronic illness or disease  Environmental or social circumstances    Summer Isaac RD, [facility], 65, 54, MH   Pager: [phone]  Weekend Pager: [phone]    "

## 2020-10-24 NOTE — PROGRESS NOTES
RN [id number]: Gave patient oxycodone for back pain, atarax & Seroquel for anxiety. Vss, Pt had shower today, mepilex applied to both arms for skin tears. Ambulated in hallways X2 today. Door alarm activated/isolation maintained for CDIFF.

## 2020-10-24 NOTE — PLAN OF CARE
DATE & TIME: 10/23/2020 [id number]  Cognitive Concerns/ Orientation: A&Ox4   BEHAVIOR & AGGRESSION TOOL COLOR: Green  CIWA SCORE: N/A  ABNL VS/O2: VSS on RA  MOBILITY: Up independently, SBA in halls. He is holdable if tries to leave  PAIN MANAGMENT: Back pain- oxycodone given x2  DIET: 2g Na, good appetite, tolerating well  BOWEL/BLADDER: Continent.   ABNL LAB/BG: N/A  DRAIN/DEVICES: PIV SL  SKIN: Bruising, skin tears (dressings changed by AM nurse) edematous legs +1.  D/C DAY/GOALS/PLACE: Pending clinical improvement and determination of commitment. Per last psych note on 10/19, pt is holdable if decides to leave. New psych consult placed.  OTHER IMPORTANT INFO: Enteric isolation maintained, on oral Vancocin for Cdiff. Likely to complete soon. PT, OT, Psych, CD following. Door alarm. Denies suicidal ideations.

## 2020-10-24 NOTE — PLAN OF CARE
DATE & TIME: 10/24/2020 7-3pm  Cognitive Concerns/ Orientation: A&Ox4   BEHAVIOR & AGGRESSION TOOL COLOR: Green .   CIWA SCORE: N/A  ABNL VS/O2: VSS on RA  MOBILITY: Up independently, SBA in halls. He is holdable if tries to leave  PAIN MANAGMENT: Back pain- can have oxycodone prn  DIET: 2g Na, good appetite, tolerating well  BOWEL/BLADDER: Continent.   ABNL LAB/BG: N/A  DRAIN/DEVICES: PIV SL  SKIN: Bruising, skin tears (dressings changed by AM nurse) edematous legs +1.  D/C DAY/GOALS/PLACE: Pending clinical improvement and determination of commitment. Per last psych note on 10/19, pt is holdable if decides to leave. New psych consult placed.  OTHER IMPORTANT INFO: Enteric isolation maintained, on oral Vancocin for Cdiff. Likely to complete soon. PT, OT, Psych, CD following. Door alarm. Denies suicidal ideations.

## 2020-10-25 LAB
ANION GAP SERPL CALCULATED.3IONS-SCNC: 8 MMOL/L (ref 3–14)
BUN SERPL-MCNC: 32 MG/DL (ref 7–30)
CALCIUM SERPL-MCNC: 8 MG/DL (ref 8.5–10.1)
CHLORIDE SERPL-SCNC: 104 MMOL/L (ref 94–109)
CO2 SERPL-SCNC: 20 MMOL/L (ref 20–32)
CREAT SERPL-MCNC: 2.33 MG/DL (ref 0.66–1.25)
ERYTHROCYTE [DISTWIDTH] IN BLOOD BY AUTOMATED COUNT: 15.6 % (ref 10–15)
GFR SERPL CREATININE-BSD FRML MDRD: 28 ML/MIN/{1.73_M2}
GLUCOSE SERPL-MCNC: 88 MG/DL (ref 70–99)
HCT VFR BLD AUTO: 26.1 % (ref 40–53)
HGB BLD-MCNC: 8.6 G/DL (ref 13.3–17.7)
MAGNESIUM SERPL-MCNC: 1.8 MG/DL (ref 1.6–2.3)
MCH RBC QN AUTO: 29.7 PG (ref 26.5–33)
MCHC RBC AUTO-ENTMCNC: 33 G/DL (ref 31.5–36.5)
MCV RBC AUTO: 90 FL (ref 78–100)
PHOSPHATE SERPL-MCNC: 4.6 MG/DL (ref 2.5–4.5)
PLATELET # BLD AUTO: 190 10E9/L (ref 150–450)
POTASSIUM SERPL-SCNC: 4.8 MMOL/L (ref 3.4–5.3)
RBC # BLD AUTO: 2.9 10E12/L (ref 4.4–5.9)
SODIUM SERPL-SCNC: 132 MMOL/L (ref 133–144)
WBC # BLD AUTO: 3.3 10E9/L (ref 4–11)

## 2020-10-25 PROCEDURE — 83735 ASSAY OF MAGNESIUM: CPT | Performed by: INTERNAL MEDICINE

## 2020-10-25 PROCEDURE — 120N000001 HC R&B MED SURG/OB

## 2020-10-25 PROCEDURE — 85027 COMPLETE CBC AUTOMATED: CPT | Performed by: INTERNAL MEDICINE

## 2020-10-25 PROCEDURE — 36415 COLL VENOUS BLD VENIPUNCTURE: CPT | Performed by: INTERNAL MEDICINE

## 2020-10-25 PROCEDURE — 99232 SBSQ HOSP IP/OBS MODERATE 35: CPT | Performed by: INTERNAL MEDICINE

## 2020-10-25 PROCEDURE — 80048 BASIC METABOLIC PNL TOTAL CA: CPT | Performed by: INTERNAL MEDICINE

## 2020-10-25 PROCEDURE — 250N000013 HC RX MED GY IP 250 OP 250 PS 637: Performed by: STUDENT IN AN ORGANIZED HEALTH CARE EDUCATION/TRAINING PROGRAM

## 2020-10-25 PROCEDURE — 84100 ASSAY OF PHOSPHORUS: CPT | Performed by: INTERNAL MEDICINE

## 2020-10-25 PROCEDURE — 250N000013 HC RX MED GY IP 250 OP 250 PS 637: Performed by: INTERNAL MEDICINE

## 2020-10-25 RX ADMIN — HYDROXYZINE HYDROCHLORIDE 50 MG: 25 TABLET, FILM COATED ORAL at 21:17

## 2020-10-25 RX ADMIN — LIDOCAINE 1 PATCH: 560 PATCH PERCUTANEOUS; TOPICAL; TRANSDERMAL at 20:17

## 2020-10-25 RX ADMIN — ATENOLOL 25 MG: 25 TABLET ORAL at 08:45

## 2020-10-25 RX ADMIN — MAGNESIUM OXIDE 400 MG: 400 TABLET ORAL at 08:45

## 2020-10-25 RX ADMIN — POTASSIUM CHLORIDE 20 MEQ: 1500 TABLET, EXTENDED RELEASE ORAL at 08:45

## 2020-10-25 RX ADMIN — VORTIOXETINE 10 MG: 10 TABLET, FILM COATED ORAL at 08:45

## 2020-10-25 RX ADMIN — MULTIPLE VITAMINS W/ MINERALS TAB 1 TABLET: TAB at 08:45

## 2020-10-25 RX ADMIN — PANTOPRAZOLE SODIUM 40 MG: 40 TABLET, DELAYED RELEASE ORAL at 08:45

## 2020-10-25 RX ADMIN — PANTOPRAZOLE SODIUM 40 MG: 40 TABLET, DELAYED RELEASE ORAL at 20:17

## 2020-10-25 RX ADMIN — FUROSEMIDE 10 MG: 20 TABLET ORAL at 08:45

## 2020-10-25 RX ADMIN — NICOTINE 1 PATCH: 21 PATCH, EXTENDED RELEASE TRANSDERMAL at 08:45

## 2020-10-25 RX ADMIN — QUETIAPINE FUMARATE 100 MG: 100 TABLET ORAL at 04:55

## 2020-10-25 RX ADMIN — SPIRONOLACTONE 25 MG: 25 TABLET, FILM COATED ORAL at 08:45

## 2020-10-25 RX ADMIN — VANCOMYCIN HYDROCHLORIDE 125 MG: 125 CAPSULE ORAL at 12:47

## 2020-10-25 RX ADMIN — QUETIAPINE FUMARATE 100 MG: 100 TABLET ORAL at 17:08

## 2020-10-25 RX ADMIN — TRAZODONE HYDROCHLORIDE 100 MG: 100 TABLET ORAL at 21:18

## 2020-10-25 RX ADMIN — TAMSULOSIN HYDROCHLORIDE 0.4 MG: 0.4 CAPSULE ORAL at 08:45

## 2020-10-25 RX ADMIN — VANCOMYCIN HYDROCHLORIDE 125 MG: 125 CAPSULE ORAL at 21:18

## 2020-10-25 RX ADMIN — OXYCODONE HYDROCHLORIDE 5 MG: 5 TABLET ORAL at 11:19

## 2020-10-25 RX ADMIN — HYDROXYZINE HYDROCHLORIDE 50 MG: 25 TABLET, FILM COATED ORAL at 11:19

## 2020-10-25 RX ADMIN — VANCOMYCIN HYDROCHLORIDE 125 MG: 125 CAPSULE ORAL at 08:45

## 2020-10-25 RX ADMIN — VANCOMYCIN HYDROCHLORIDE 125 MG: 125 CAPSULE ORAL at 17:08

## 2020-10-25 RX ADMIN — MIRTAZAPINE 30 MG: 15 TABLET, FILM COATED ORAL at 21:17

## 2020-10-25 RX ADMIN — FOLIC ACID 1 MG: 1 TABLET ORAL at 08:45

## 2020-10-25 RX ADMIN — OXYCODONE HYDROCHLORIDE 5 MG: 5 TABLET ORAL at 17:46

## 2020-10-25 ASSESSMENT — ACTIVITIES OF DAILY LIVING (ADL)
ADLS_ACUITY_SCORE: 11

## 2020-10-25 NOTE — PLAN OF CARE
DATE & TIME: 10/24/2020 7312-0598  Cognitive Concerns/ Orientation: A&Ox4   BEHAVIOR & AGGRESSION TOOL COLOR: Green.  CIWA SCORE: N/A  ABNL VS/O2: VSS on RA  MOBILITY: Up independently, SBA in halls. He is holdable if tries to leave  PAIN MANAGMENT: Back pain- oxycodone given x1 this shift.  DIET: 2g Na, good appetite, tolerating well  BOWEL/BLADDER: Continent.   ABNL LAB/BG: N/A  DRAIN/DEVICES: PIV SL  SKIN: Bruising, skin tears (dressings changed by AM nurse) edematous legs +1.  D/C DAY/GOALS/PLACE: Pending clinical improvement and determination of commitment. Per last psych note on 10/19, pt is holdable if decides to leave. New psych consult placed.  OTHER IMPORTANT INFO: Enteric isolation maintained, on oral Vancocin for Cdiff. Likely to complete soon. PT, OT, Psych, CD following. Door alarm. Denies suicidal ideations.

## 2020-10-25 NOTE — PLAN OF CARE
DATE & TIME: 10/25/2020 7-3PM  Cognitive Concerns/ Orientation: A&Ox4   BEHAVIOR & AGGRESSION TOOL COLOR: Green.  CIWA SCORE: N/A  ABNL VS/O2: VSS on RA  MOBILITY: Up independently, SBA in halls. He is holdable if tries to leave  PAIN MANAGMENT: Back pain- oxycodone given x1, atarax X1 this shift.  DIET: 2g Na, good appetite, tolerating well  BOWEL/BLADDER: Continent.   ABNL LAB/BG: creat 2.33 bun 32 Hgb 8.6 on K+  Mg  Phos protocols/none needed to be replaced.  DRAIN/DEVICES: PIV SL  SKIN: Bruising, skin tears (dressings changed by AM nurse) edematous legs +1.  D/C DAY/GOALS/PLACE: Pending clinical improvement and determination of commitment. Per last psych note on 10/19, pt is holdable if decides to leave. New psych consult placed.  OTHER IMPORTANT INFO: Enteric isolation maintained, on oral Vancocin for Cdiff. Likely to complete soon. PT, OT, Psych, CD following. Door alarm. Denies suicidal ideations.

## 2020-10-25 NOTE — PROGRESS NOTES
[facility]    Hospitalist Progress Note    Assessment & Plan   Jim Richard is a 65 year old gentleman with substance use disorder, cirrhosis, depression, recent hospitalization for intentional overdose, and multiple hospitalizations for intoxication who was admitted again on 10/14/2020, due to continued drinking and suicidal ideation.  Recently hospitalized from 10/6 - 10/11 for alcohol intoxication with metabolic acidosis and C. difficile.  Current problems include:  Admission 10/14/20.     Depression, anxiety, suicidal ideation   Acute alcohol intoxication  Severe alcohol use disorder   - multiple recent admission for intoxication at risk of life-threateningillness/death  -Patient has been through treatment multiple times in the past. He states he stating drinking again right after discharge. BAL 0.36 at admission.    - CIWA stopped  - Psychiatry involved --> has initiated a commitment process due to his recidivism, inability to maintain sobriety, multiple admissions secondary to alcohol-related issues and expressing suicidal ideation while intoxicated.    -mood seems stable.   Plan:   - Continue PTA mirtazapine, trintellix,   -cont prnseroquel and Trazodone, Atarax as needed as well.   - Will reconsult with Psychiatry once the courts had made a determination regarding the petition for commitment or sooner as needed.     Alcoholic cirrhosis with history of esophageal varices and ascites -   -History of GI bleed.  Grade 1 esophageal varices with portal gastropathy. He c/o diffuse abdominal discomfort and has obvious fluid wave on exam with diffuse mild tenderness on exam. Last paracentesis on 10/9 was negative for SBP and patient currently refusing paracentesis stating pain no different now than prior to that procedure. S/p paracentesis 10/19. Fluid cx ngtd  Wt stable. No abd pain. Cr up a bit since admission.   Plan:  - continue lasix and spironolactone  - continue PPI   - continue  PTA potassium BID  - continue PTA atenolol   -am bmp     Poor PO intake; concerned about possible malnutrition.  - RD consult --> Non-Severe malnutrition  In Context of:  Chronic illness or disease, Environmental or social circumstances  -states he has good appetite and taking po     Lactic acidosis secondary to above.  Hemodynamically stable with low suspicion for infection / sepsis  - continue current monitoring     C. difficile colitis, diagnosed during his last admission CT A/P with circumferential wall thickening of rectum suggesting distal colitis/proctitis. procalcitonin low at 0.07. WBC 7.7. Afebrile.  BM's mainly formed now.  negattive stool panel.     -diarrhea resolved. Benign abd exam  - oral vancomycin 125 mg every 6 hours with plan to continue to complete 10 more days as ordered at discharge.     NGUYEN in setting of Chronic kidney disease stage III  Cr 1.63, Baseline creatinine 1.4-1.9.   - repeat BMP 10/25  -Cr stable 2.3 range  -on lasix, Kdur and aldactone  - am bmp     Chronic anemia, nl MCV, stable  - hgb 10.2       BPH  -Continue flomax     JANIS,   - home CPAP ordered        DVT Prophylaxis: Pneumatic Compression Devices  Code Status: Full Code  Expected discharge: TBD pending Psychiatry and commitment process       Hasmukh Osullivan MD  Text Page  (7am to 6pm)  Interval History   No n/v/d/abd pain. Good appetite and taking po.   Mood is up and down but fine last 1-2 days. No new concerns. No RN concerns    -Data reviewed today: I reviewed all new labs and imaging results over the last 24 hours. I personally reviewed labs from today    Physical Exam   Temp: 98.4  F (36.9  C) Temp src: Oral BP: 139/67 Pulse: 50   Resp: 18 SpO2: 97 % O2 Device: None (Room air)    Vitals:    10/18/20 0545 10/23/20 0500 10/25/20 0619   Weight: 87.1 kg (192 lb 1.6 oz) 80 kg (176 lb 6.4 oz) 80.5 kg (177 lb 7.5 oz)     Vital Signs with Ranges  Temp:  [97.8  F (36.6  C)-98.6  F (37  C)] 98.4  F (36.9  C)  Pulse:  [48-51]  50  Resp:  [16-18] 18  BP: (131-148)/(57-72) 139/67  SpO2:  [95 %-98 %] 97 %  I/O last 3 completed shifts:  In: 800 [P.O.:800]  Out: -     Constitutional: Up in chair, nad  Respiratory: CTAB  Cardiovascular: RRR no r./g/m  GI: soft, nt, nd,   Skin/Integumen: no rash or edema  Neuro/Psych: nl affect, fully oriented, pleasant, cooperative. Nl speech and mentation. No tremor      Medications     - MEDICATION INSTRUCTIONS -         atenolol  25 mg Oral Daily     folic acid  1 mg Oral Daily     furosemide  10 mg Oral Daily     lidocaine  1 patch Transdermal Q24h    And     lidocaine   Transdermal Q8H     magnesium oxide  400 mg Oral Daily     mirtazapine  30 mg Oral At Bedtime     multivitamin w/minerals  1 tablet Oral Daily     nicotine  1 patch Transdermal Daily     nicotine   Transdermal Q8H     pantoprazole  40 mg Oral BID     potassium chloride ER  20 mEq Oral BID     sodium chloride (PF)  3 mL Intracatheter Q8H     spironolactone  25 mg Oral Daily     tamsulosin  0.4 mg Oral Daily     vancomycin  125 mg Oral 4x Daily     vortioxetine  10 mg Oral Daily       Data   Recent Labs   Lab 10/25/20  0757 10/24/20  1221 10/21/20  0912 10/19/20  1007 10/18/20  1537 10/18/20  1537   WBC 3.3*  --  5.1 6.0  --   --    HGB 8.6*  --  9.1* 8.6*  --   --    MCV 90  --  92 92  --   --      --  179 153  --   --    INR  --   --   --  1.37*  --   --    * 133 137 132*   < >  --    POTASSIUM 4.8 4.9 4.4 4.6   < >  --    CHLORIDE 104 104 110* 106   < >  --    CO2 20 23 23 20   < >  --    BUN 32* 32* 25 19   < >  --    CR 2.33* 2.29* 2.16* 1.96*   < >  --    ANIONGAP 8 6 4 6   < >  --    KEV 8.0* 8.4* 8.2* 8.2*   < >  --    GLC 88 83 98 112*   < >  --    ALBUMIN  --   --   --  2.7*  --  2.5*   PROTTOTAL  --   --   --  6.3*  --   --    BILITOTAL  --   --   --  0.5  --   --    ALKPHOS  --   --   --  156*  --   --    ALT  --   --   --  19  --   --    AST  --   --   --  28  --   --     < > = values in this interval not displayed.        Imaging:   No results found for this or any previous visit (from the past 24 hour(s)).

## 2020-10-26 ENCOUNTER — APPOINTMENT (OUTPATIENT)
Dept: OCCUPATIONAL THERAPY | Facility: CLINIC | Age: 65
DRG: 896 | End: 2020-10-26
Payer: MEDICARE

## 2020-10-26 LAB
ANION GAP SERPL CALCULATED.3IONS-SCNC: 6 MMOL/L (ref 3–14)
BUN SERPL-MCNC: 29 MG/DL (ref 7–30)
CALCIUM SERPL-MCNC: 7.9 MG/DL (ref 8.5–10.1)
CHLORIDE SERPL-SCNC: 102 MMOL/L (ref 94–109)
CO2 SERPL-SCNC: 21 MMOL/L (ref 20–32)
CREAT SERPL-MCNC: 2.33 MG/DL (ref 0.66–1.25)
GFR SERPL CREATININE-BSD FRML MDRD: 28 ML/MIN/{1.73_M2}
GLUCOSE SERPL-MCNC: 135 MG/DL (ref 70–99)
MAGNESIUM SERPL-MCNC: 1.7 MG/DL (ref 1.6–2.3)
OSMOLALITY SERPL: 277 MMOL/KG (ref 280–301)
OSMOLALITY UR: 221 MMOL/KG (ref 100–1200)
POTASSIUM SERPL-SCNC: 4.6 MMOL/L (ref 3.4–5.3)
SODIUM SERPL-SCNC: 129 MMOL/L (ref 133–144)
SODIUM UR-SCNC: 31 MMOL/L

## 2020-10-26 PROCEDURE — 83930 ASSAY OF BLOOD OSMOLALITY: CPT | Performed by: INTERNAL MEDICINE

## 2020-10-26 PROCEDURE — 36415 COLL VENOUS BLD VENIPUNCTURE: CPT | Performed by: INTERNAL MEDICINE

## 2020-10-26 PROCEDURE — 99233 SBSQ HOSP IP/OBS HIGH 50: CPT | Performed by: INTERNAL MEDICINE

## 2020-10-26 PROCEDURE — 250N000013 HC RX MED GY IP 250 OP 250 PS 637: Performed by: STUDENT IN AN ORGANIZED HEALTH CARE EDUCATION/TRAINING PROGRAM

## 2020-10-26 PROCEDURE — 99207 PR CDG-CODE CATEGORY CHANGED: CPT | Performed by: PSYCHIATRY & NEUROLOGY

## 2020-10-26 PROCEDURE — 99233 SBSQ HOSP IP/OBS HIGH 50: CPT | Performed by: PSYCHIATRY & NEUROLOGY

## 2020-10-26 PROCEDURE — 97535 SELF CARE MNGMENT TRAINING: CPT | Mod: GO | Performed by: OCCUPATIONAL THERAPY ASSISTANT

## 2020-10-26 PROCEDURE — 83935 ASSAY OF URINE OSMOLALITY: CPT | Performed by: INTERNAL MEDICINE

## 2020-10-26 PROCEDURE — 97530 THERAPEUTIC ACTIVITIES: CPT | Mod: GO | Performed by: OCCUPATIONAL THERAPY ASSISTANT

## 2020-10-26 PROCEDURE — 120N000001 HC R&B MED SURG/OB

## 2020-10-26 PROCEDURE — 250N000013 HC RX MED GY IP 250 OP 250 PS 637: Performed by: PSYCHIATRY & NEUROLOGY

## 2020-10-26 PROCEDURE — 83735 ASSAY OF MAGNESIUM: CPT | Performed by: INTERNAL MEDICINE

## 2020-10-26 PROCEDURE — 250N000013 HC RX MED GY IP 250 OP 250 PS 637: Performed by: INTERNAL MEDICINE

## 2020-10-26 PROCEDURE — 80048 BASIC METABOLIC PNL TOTAL CA: CPT | Performed by: INTERNAL MEDICINE

## 2020-10-26 PROCEDURE — 84300 ASSAY OF URINE SODIUM: CPT | Performed by: INTERNAL MEDICINE

## 2020-10-26 RX ORDER — QUETIAPINE FUMARATE 100 MG/1
100 TABLET, FILM COATED ORAL 4 TIMES DAILY PRN
Status: DISCONTINUED | OUTPATIENT
Start: 2020-10-26 | End: 2020-11-02

## 2020-10-26 RX ADMIN — NICOTINE 1 PATCH: 21 PATCH, EXTENDED RELEASE TRANSDERMAL at 09:13

## 2020-10-26 RX ADMIN — MULTIPLE VITAMINS W/ MINERALS TAB 1 TABLET: TAB at 09:14

## 2020-10-26 RX ADMIN — VANCOMYCIN HYDROCHLORIDE 125 MG: 125 CAPSULE ORAL at 21:31

## 2020-10-26 RX ADMIN — LIDOCAINE 1 PATCH: 560 PATCH PERCUTANEOUS; TOPICAL; TRANSDERMAL at 21:30

## 2020-10-26 RX ADMIN — PANTOPRAZOLE SODIUM 40 MG: 40 TABLET, DELAYED RELEASE ORAL at 09:14

## 2020-10-26 RX ADMIN — ACETAMINOPHEN 650 MG: 325 TABLET, FILM COATED ORAL at 06:00

## 2020-10-26 RX ADMIN — QUETIAPINE 25 MG: 25 TABLET ORAL at 01:27

## 2020-10-26 RX ADMIN — VORTIOXETINE 10 MG: 10 TABLET, FILM COATED ORAL at 09:14

## 2020-10-26 RX ADMIN — OXYCODONE HYDROCHLORIDE 5 MG: 5 TABLET ORAL at 07:43

## 2020-10-26 RX ADMIN — VANCOMYCIN HYDROCHLORIDE 125 MG: 125 CAPSULE ORAL at 18:13

## 2020-10-26 RX ADMIN — ATENOLOL 25 MG: 25 TABLET ORAL at 09:14

## 2020-10-26 RX ADMIN — QUETIAPINE FUMARATE 100 MG: 100 TABLET ORAL at 14:58

## 2020-10-26 RX ADMIN — QUETIAPINE FUMARATE 100 MG: 100 TABLET ORAL at 21:31

## 2020-10-26 RX ADMIN — MAGNESIUM OXIDE 400 MG: 400 TABLET ORAL at 09:14

## 2020-10-26 RX ADMIN — VANCOMYCIN HYDROCHLORIDE 125 MG: 125 CAPSULE ORAL at 13:16

## 2020-10-26 RX ADMIN — PANTOPRAZOLE SODIUM 40 MG: 40 TABLET, DELAYED RELEASE ORAL at 21:31

## 2020-10-26 RX ADMIN — HYDROXYZINE HYDROCHLORIDE 50 MG: 25 TABLET, FILM COATED ORAL at 10:03

## 2020-10-26 RX ADMIN — OXYCODONE HYDROCHLORIDE 5 MG: 5 TABLET ORAL at 01:27

## 2020-10-26 RX ADMIN — MIRTAZAPINE 30 MG: 15 TABLET, FILM COATED ORAL at 21:31

## 2020-10-26 RX ADMIN — QUETIAPINE FUMARATE 100 MG: 100 TABLET ORAL at 11:05

## 2020-10-26 RX ADMIN — FOLIC ACID 1 MG: 1 TABLET ORAL at 09:14

## 2020-10-26 RX ADMIN — TAMSULOSIN HYDROCHLORIDE 0.4 MG: 0.4 CAPSULE ORAL at 09:14

## 2020-10-26 RX ADMIN — VANCOMYCIN HYDROCHLORIDE 125 MG: 125 CAPSULE ORAL at 09:14

## 2020-10-26 ASSESSMENT — ACTIVITIES OF DAILY LIVING (ADL)
ADLS_ACUITY_SCORE: 11

## 2020-10-26 NOTE — PLAN OF CARE
DATE & TIME: 10/26/20 1115-6234    Cognitive Concerns/ Orientation : Alert/Oriented x 4. Door alarm active.   BEHAVIOR & AGGRESSION TOOL COLOR: Green   ABNL VS/O2: Bradycardic  MOBILITY: Independent in room; ambulated in muniz with SBA/walker  PAIN MANAGMENT: Oxycodone x 1 for left back and abdomen discomfort with relief  DIET: 2 gram sodium; good intake  BOWEL/BLADDER: Continent  ABNL LAB/BG: Sodium 129; Creat 2.33; GRF 28;  DRAIN/DEVICES: R PIV saline locked, patent  SKIN: Skin tears scabbed on R wrist, elbow and L wrist;  bruising. R andkle 2+ edema; puncture site R abdomen healed/small bruise.  TESTS/PROCEDURES: n/a  D/C DAY/GOALS/PLACE: Pending petition for commitment process  OTHER IMPORTANT INFO: Enteric precautions for C.Diff maintained. Door alarm active. PT/OT/Psych/CD following. Denies suicidal ideations. Pt frustrated that he cannot go home and resume his plan to sell his home and pursue senior living.  Visibly anxious and frustrated over his situation. he reports his anxiety is not under control and asking to talk to psyche; psyche consult ordered.     Addendum:  Psyche increased frequency of prn Seroquel.

## 2020-10-26 NOTE — PLAN OF CARE
PT per chart review and discussion with nursing pt I in room, doing much better, uses walker to amb in halls with nursing several time/day, awaiting commitment process. Will cont to amb with nursing.    Physical Therapy Discharge Summary    Reason for therapy discharge:    All goals and outcomes met, no further needs identified.    Progress towards therapy goal(s). See goals on Care Plan in [facility] electronic health record for goal details.  Goals met    Therapy recommendation(s):    No further therapy is recommended.

## 2020-10-26 NOTE — PROGRESS NOTES
"DATE & TIME: 10/25/2020 Night    Cognitive Concerns/ Orientation : Alert/Oriented x 4. Door alarm active.   BEHAVIOR & AGGRESSION TOOL COLOR: Green   ABNL VS/O2: Bradycardic: HR 52; /69 otherwise VSS, room air  MOBILITY: Independent   PAIN MANAGMENT: Oxycodone (Q6hrs) given for low, left back pain. Lidocaine patch in place on aforementioned site  DIET: 2 gram sodium  BOWEL/BLADDER: Continent  ABNL LAB/BG: Sodium 132; Creat 2.33; GRF 28; Phos 4.6; WBC 3.3; Hgb 8.6, Hematocrit 26.1;  DRAIN/DEVICES: R PIV saline locked, patent  SKIN: Skin tear and scab to LUE; bruising. BLE +1 edema  TESTS/PROCEDURES: n/a  D/C DAY/GOALS/PLACE: Pending commitment process  OTHER IMPORTANT INFO: Enteric precautions for C.Diff maintained. Door alarm active. PT/OT/Psych/CD following. Denies suicidal ideations. Pt frustrated that he cannot have more Seroquel at bedtime, \"That's not gonna do nothing.\"  COMMIT TO SIT DONE AND SIGNED OFF YES        "

## 2020-10-26 NOTE — PROGRESS NOTES
[facility]    Hospitalist Progress Note    Assessment & Plan   Jim Richard is a 65 year old gentleman with substance use disorder, cirrhosis, depression, recent hospitalization for intentional overdose, and multiple hospitalizations for intoxication who was admitted again on 10/14/2020, due to continued drinking and suicidal ideation.  Recently hospitalized from 10/6 - 10/11 for alcohol intoxication with metabolic acidosis and C. difficile.  Current problems include:  Admission 10/14/20.     Depression, anxiety, suicidal ideation   Acute alcohol intoxication  Severe alcohol use disorder   - multiple recent admission for intoxication at risk of life-threateningillness/death  -Patient has been through treatment multiple times in the past. He states he stating drinking again right after discharge. BAL 0.36 at admission.    - CIWA stopped  - Psychiatry involved --> has initiated a commitment process due to his recidivism, inability to maintain sobriety, multiple admissions secondary to alcohol-related issues and expressing suicidal ideation while intoxicated.    -mood seems stable yesterday but reports worsening anxiety and request to meet with psychiatry  Plan:   - Continue PTA mirtazapine, trintellix,   -cont prnseroquel and Trazodone, Atarax as needed as well.   - Will reconsult with Psychiatry once the courts had made a determination regarding the petition for commitment or sooner as needed.     Alcoholic cirrhosis with history of esophageal varices and ascites -   Elevated Cr  -History of GI bleed.  Grade 1 esophageal varices with portal gastropathy. He c/o diffuse abdominal discomfort and has obvious fluid wave on exam with diffuse mild tenderness on exam. Last paracentesis on 10/9 was negative for SBP and patient currently refusing paracentesis stating pain no different now than prior to that procedure.   -S/p paracentesis 10/19. 3.6 L removed. Fluid cx ngtd  -Pt had IV contrast  on CT abd 10/6.   -Wt stable  - Cr up ~1.6 on admission---> 2--> 2.3 X2.   - lasix/aldactone resumed on 10/21  -having about 2L fluid per day, good po intake and fluid intake  -probably fluid wave, may need paracentesis in coming week but want to ensure Cr stable to improved.     Plan:  PVR  -Lona, Uosm, serum osm  - follow for need of consult with Dr. Shaw  - holding lasix and spironolactone since 10/26  - continue PPI   - continue PTA potassium BID- holding since 10/26  - continue PTA atenolol   -am bmp     Poor PO intake; concerned about possible malnutrition.   - RD consult --> Non-Severe malnutrition  In Context of:  Chronic illness or disease, Environmental or social circumstances  -states he has good appetite and taking po now     Lactic acidosis secondary to above.  Hemodynamically stable with low suspicion for infection / sepsis  - continue current monitoring     C. difficile colitis, diagnosed during his last admission CT A/P with circumferential wall thickening of rectum suggesting distal colitis/proctitis. procalcitonin low at 0.07. WBC 7.7. Afebrile.  BM's mainly formed now.  negattive stool panel.     -diarrhea resolved. Benign abd exam  - oral vancomycin 125 mg every 6 hours with plan to continue to complete 10 more days as ordered at discharge.  -stop prn narcotics.      NGUYEN in setting of Chronic kidney disease stage III  Cr 1.63, Baseline creatinine 1.4-1.9.   - repeat BMP 10/25  -Cr stable 2.3 range  -on lasix, Kdur and aldactone  - am bmp  -Cr up to 2.3 and stable X 2  - cont to hold diuretics and Kdur- last dose 10/25.   - am bmp, urine lytes  -PVR     Chronic anemia, nl MCV, stable  - hgb 10.2       BPH  -Continue flomax     JANIS,   - home CPAP ordered        DVT Prophylaxis: Pneumatic Compression Devices  Code Status: Full Code  Expected discharge: TBD pending Psychiatry and commitment process       Hasmukh Osullivan MD  Text Page  (7am to 6pm)  Interval History   No n/v/d/abd pain. Good  appetite and taking po.   Feels his anxiety is worse  Requests to see psychiatry      -Data reviewed today: I reviewed all new labs and imaging results over the last 24 hours. I personally reviewed labs from today    Physical Exam   Temp: 98.7  F (37.1  C) Temp src: Oral BP: 118/53 Pulse: (!) 49   Resp: 18 SpO2: 94 % O2 Device: None (Room air)    Vitals:    10/18/20 0545 10/23/20 0500 10/25/20 0619   Weight: 87.1 kg (192 lb 1.6 oz) 80 kg (176 lb 6.4 oz) 80.5 kg (177 lb 7.5 oz)     Vital Signs with Ranges  Temp:  [98.1  F (36.7  C)-98.7  F (37.1  C)] 98.7  F (37.1  C)  Pulse:  [46-52] 49  Resp:  [18] 18  BP: (118-144)/(53-69) 118/53  SpO2:  [94 %-98 %] 94 %  I/O last 3 completed shifts:  In: 480 [P.O.:480]  Out: -     Constitutional: Up in chair, nad  Respiratory: CTAB  Cardiovascular: RRR no r./g/m  GI: soft, nt, nd,   Skin/Integumen: no rash or edema  Neuro/Psych: nl affect, fully oriented, seems more anxious irritable today, cooperative. Nl speech and mentation. No tremor      Medications     - MEDICATION INSTRUCTIONS -         atenolol  25 mg Oral Daily     folic acid  1 mg Oral Daily     [Held by provider] furosemide  10 mg Oral Daily     lidocaine  1 patch Transdermal Q24h    And     lidocaine   Transdermal Q8H     magnesium oxide  400 mg Oral Daily     mirtazapine  30 mg Oral At Bedtime     multivitamin w/minerals  1 tablet Oral Daily     nicotine  1 patch Transdermal Daily     nicotine   Transdermal Q8H     pantoprazole  40 mg Oral BID     [Held by provider] potassium chloride ER  20 mEq Oral BID     sodium chloride (PF)  3 mL Intracatheter Q8H     [Held by provider] spironolactone  25 mg Oral Daily     tamsulosin  0.4 mg Oral Daily     vancomycin  125 mg Oral 4x Daily     vortioxetine  10 mg Oral Daily       Data   Recent Labs   Lab 10/26/20  0903 10/25/20  0757 10/24/20  1221 10/21/20  0912   WBC  --  3.3*  --  5.1   HGB  --  8.6*  --  9.1*   MCV  --  90  --  92   PLT  --  190  --  179   * 132* 133  137   POTASSIUM 4.6 4.8 4.9 4.4   CHLORIDE 102 104 104 110*   CO2 21 20 23 23   BUN 29 32* 32* 25   CR 2.33* 2.33* 2.29* 2.16*   ANIONGAP 6 8 6 4   KEV 7.9* 8.0* 8.4* 8.2*   * 88 83 98       Imaging:   No results found for this or any previous visit (from the past 24 hour(s)).

## 2020-10-26 NOTE — PLAN OF CARE
DATE & TIME: 10/25/2020 PM shift  Cognitive Concerns/ Orientation: A&Ox4   BEHAVIOR & AGGRESSION TOOL COLOR: Green.  CIWA SCORE: N/A  ABNL VS/O2: VSS on RA  MOBILITY: Up independently, SBA in halls. He is holdable if tries to leave  PAIN MANAGMENT: Back pain- oxycodone given x1, atarax X1 this shift.  DIET: 2g Na, good appetite, tolerating well  BOWEL/BLADDER: Continent. No bm  ABNL LAB/BG: creat 2.33 bun 32 Hgb 8.6 on K+  Mg  Phos protocols/none needed to be replaced.  DRAIN/DEVICES: PIV SL  SKIN: Bruising, scabs.   D/C DAY/GOALS/PLACE: Pending clinical improvement and determination of commitment.  OTHER IMPORTANT INFO: Enteric isolation maintained, on oral Vancocin for Cdiff. Likely to complete soon. PT, OT, Psych, CD following. Door alarm. Denies suicidal ideations.

## 2020-10-26 NOTE — CONSULTS
[facility]  Psychiatry Consultation - Follow-up note      Interim History:   Follow-up requested by the patient today.  The patient was last seen by psychiatry on October 22.  No acute issues overnight.    On examination today, the patient complained of anxiety which he attributes to not receiving updates from the [facility] regarding the petition for commitment and court examination.  He explains that he typically takes a higher dose of Seroquel reporting still.  Otherwise, depressive symptoms are minimal and he interprets his mood to be euthymic.    He denied suicidal or homicidal thoughts.  He denied psychotic symptoms.  Tolerating medications without side effects.  Eating and sleeping adequately.    He continues to report no desire to pursue chemical dependency treatment.  He remains ambivalent regarding whether any consumes alcohol or not.  He makes it clear that he does not drink alcohol with intent to harm himself however understands that ongoing alcohol usage could lead to potentially fatal consequences in the context of his chronic medical conditions.           Medications:       atenolol  25 mg Oral Daily     folic acid  1 mg Oral Daily     [Held by provider] furosemide  10 mg Oral Daily     lidocaine  1 patch Transdermal Q24h    And     lidocaine   Transdermal Q8H     magnesium oxide  400 mg Oral Daily     mirtazapine  30 mg Oral At Bedtime     multivitamin w/minerals  1 tablet Oral Daily     nicotine  1 patch Transdermal Daily     nicotine   Transdermal Q8H     pantoprazole  40 mg Oral BID     [Held by provider] potassium chloride ER  20 mEq Oral BID     sodium chloride (PF)  3 mL Intracatheter Q8H     [Held by provider] spironolactone  25 mg Oral Daily     tamsulosin  0.4 mg Oral Daily     vancomycin  125 mg Oral 4x Daily     vortioxetine  10 mg Oral Daily          Allergies:     Allergies   Allergen Reactions     Amlodipine Swelling     Lisinopril      Other reaction(s):  Angioedema  Mouth and tongue swelling   Mouth and tongue swelling             Labs:     Recent Results (from the past 24 hour(s))   Basic metabolic panel    Collection Time: 10/26/20  9:03 AM   Result Value Ref Range    Sodium 129 (L) 133 - 144 mmol/L    Potassium 4.6 3.4 - 5.3 mmol/L    Chloride 102 94 - 109 mmol/L    Carbon Dioxide 21 20 - 32 mmol/L    Anion Gap 6 3 - 14 mmol/L    Glucose 135 (H) 70 - 99 mg/dL    Urea Nitrogen 29 7 - 30 mg/dL    Creatinine 2.33 (H) 0.66 - 1.25 mg/dL    GFR Estimate 28 (L) >60 mL/min/[1.73_m2]    GFR Estimate If Black 32 (L) >60 mL/min/[1.73_m2]    Calcium 7.9 (L) 8.5 - 10.1 mg/dL   Magnesium    Collection Time: 10/26/20  9:03 AM   Result Value Ref Range    Magnesium 1.7 1.6 - 2.3 mg/dL   Osmolality    Collection Time: 10/26/20  9:03 AM   Result Value Ref Range    Osmolality 277 (L) 280 - 301 mmol/kg   Sodium random urine    Collection Time: 10/26/20 12:20 PM   Result Value Ref Range    Sodium Urine mmol/L 31 mmol/L   Osmolality urine    Collection Time: 10/26/20 12:20 PM   Result Value Ref Range    Urine Osmolality 221 100 - 1,200 mmol/kg          Psychiatric Examination:     /53 (BP Location: Left arm)   Pulse (!) 49   Temp 98.7  F (37.1  C) (Oral)   Resp 18   Wt 80.5 kg (177 lb 7.5 oz)   SpO2 94%   BMI 27.80 kg/m    Weight is 177 lbs 7.52 oz  Body mass index is 27.8 kg/m .  Orthostatic Vitals     None            Appearance: awake, alert  Attitude:  cooperative  Eye Contact:  fair  Mood:  better  Affect:  appropriate and in normal range  Speech:  clear, coherent  Psychomotor Behavior:  tremor observed   Throught Process:  logical and linear  Associations:  no loose associations  Thought Content:  no evidence of suicidal ideation or homicidal ideation and no evidence of psychotic thought  Insight:  fair  Judgement:  intact  Oriented to:  time, person, and place  Attention Span and Concentration:  fair  Recent and Remote Memory:  intact           DIagnoses:     Alcohol  use disorder, severe  Alcohol withdrawal  Major depressive disorder-recurrent, severe without psychotic features  Hepatic cirrhosis with ascites and esophageal varices  BPH  Chronic kidney disease  C. difficile colitis      Recommendations:     Continue Trintellix at the current dose for treatment of a depressive disorder.  His mood continues to improve and he is denying suicidal ideation today.    Seroquel was increased from 100 mg twice daily as needed to 100 mg 4 times daily as needed for management of moderate to severe anxiety.    He was educated regarding what to expect from the upcoming court proceedings.  He plans to refuse chemical dependency treatments as he does not desire total abstinence from alcohol.    Chemical dependency consultation noted: Patient be reconsulted if the courts decide to support involuntary commitment for chemical dependency treatment.  The patient will likely be limited to hospital-based programs for treatment due to his funding source.    The most ideal and optimal disposition plan involves residential MI/CD treatment.  If through the upcoming court proceedings, the patient is determined as having the right to refuse the recommended treatment despite the risk of life-threatening consequences, his disposition plan can then be revisited.    Continue to maintain a door alarm to minimize the risk of elopement while under a court hold.    Please reconsult with Psychiatry once the courts had made a determination regarding the petition for commitment or sooner as needed.  Jeff Brito MD   Text Page

## 2020-10-27 LAB
ANION GAP SERPL CALCULATED.3IONS-SCNC: 8 MMOL/L (ref 3–14)
BUN SERPL-MCNC: 27 MG/DL (ref 7–30)
CALCIUM SERPL-MCNC: 8.5 MG/DL (ref 8.5–10.1)
CHLORIDE SERPL-SCNC: 101 MMOL/L (ref 94–109)
CO2 SERPL-SCNC: 21 MMOL/L (ref 20–32)
CREAT SERPL-MCNC: 2.1 MG/DL (ref 0.66–1.25)
GFR SERPL CREATININE-BSD FRML MDRD: 32 ML/MIN/{1.73_M2}
GLUCOSE SERPL-MCNC: 118 MG/DL (ref 70–99)
MAGNESIUM SERPL-MCNC: 1.9 MG/DL (ref 1.6–2.3)
OSMOLALITY SERPL: 280 MMOL/KG (ref 280–301)
OSMOLALITY UR: 237 MMOL/KG (ref 100–1200)
POTASSIUM SERPL-SCNC: 4.4 MMOL/L (ref 3.4–5.3)
SODIUM SERPL-SCNC: 130 MMOL/L (ref 133–144)
SODIUM UR-SCNC: 49 MMOL/L

## 2020-10-27 PROCEDURE — 83930 ASSAY OF BLOOD OSMOLALITY: CPT | Performed by: INTERNAL MEDICINE

## 2020-10-27 PROCEDURE — 83935 ASSAY OF URINE OSMOLALITY: CPT | Performed by: INTERNAL MEDICINE

## 2020-10-27 PROCEDURE — 120N000001 HC R&B MED SURG/OB

## 2020-10-27 PROCEDURE — 83735 ASSAY OF MAGNESIUM: CPT | Performed by: INTERNAL MEDICINE

## 2020-10-27 PROCEDURE — 250N000013 HC RX MED GY IP 250 OP 250 PS 637: Performed by: INTERNAL MEDICINE

## 2020-10-27 PROCEDURE — 80048 BASIC METABOLIC PNL TOTAL CA: CPT | Performed by: INTERNAL MEDICINE

## 2020-10-27 PROCEDURE — 84300 ASSAY OF URINE SODIUM: CPT | Performed by: INTERNAL MEDICINE

## 2020-10-27 PROCEDURE — 99232 SBSQ HOSP IP/OBS MODERATE 35: CPT | Performed by: INTERNAL MEDICINE

## 2020-10-27 PROCEDURE — 250N000013 HC RX MED GY IP 250 OP 250 PS 637: Performed by: PSYCHIATRY & NEUROLOGY

## 2020-10-27 PROCEDURE — 250N000013 HC RX MED GY IP 250 OP 250 PS 637: Performed by: STUDENT IN AN ORGANIZED HEALTH CARE EDUCATION/TRAINING PROGRAM

## 2020-10-27 PROCEDURE — 36415 COLL VENOUS BLD VENIPUNCTURE: CPT | Performed by: INTERNAL MEDICINE

## 2020-10-27 RX ADMIN — HYDROXYZINE HYDROCHLORIDE 50 MG: 25 TABLET, FILM COATED ORAL at 21:41

## 2020-10-27 RX ADMIN — VANCOMYCIN HYDROCHLORIDE 125 MG: 125 CAPSULE ORAL at 18:06

## 2020-10-27 RX ADMIN — VORTIOXETINE 10 MG: 10 TABLET, FILM COATED ORAL at 07:41

## 2020-10-27 RX ADMIN — VANCOMYCIN HYDROCHLORIDE 125 MG: 125 CAPSULE ORAL at 21:39

## 2020-10-27 RX ADMIN — MULTIPLE VITAMINS W/ MINERALS TAB 1 TABLET: TAB at 07:39

## 2020-10-27 RX ADMIN — TRAZODONE HYDROCHLORIDE 100 MG: 100 TABLET ORAL at 21:38

## 2020-10-27 RX ADMIN — PANTOPRAZOLE SODIUM 40 MG: 40 TABLET, DELAYED RELEASE ORAL at 20:15

## 2020-10-27 RX ADMIN — MIRTAZAPINE 30 MG: 15 TABLET, FILM COATED ORAL at 21:38

## 2020-10-27 RX ADMIN — VANCOMYCIN HYDROCHLORIDE 125 MG: 125 CAPSULE ORAL at 14:35

## 2020-10-27 RX ADMIN — LIDOCAINE 1 PATCH: 560 PATCH PERCUTANEOUS; TOPICAL; TRANSDERMAL at 20:14

## 2020-10-27 RX ADMIN — FOLIC ACID 1 MG: 1 TABLET ORAL at 07:41

## 2020-10-27 RX ADMIN — TAMSULOSIN HYDROCHLORIDE 0.4 MG: 0.4 CAPSULE ORAL at 07:40

## 2020-10-27 RX ADMIN — HYDROXYZINE HYDROCHLORIDE 50 MG: 25 TABLET, FILM COATED ORAL at 21:38

## 2020-10-27 RX ADMIN — HYDROXYZINE HYDROCHLORIDE 50 MG: 25 TABLET, FILM COATED ORAL at 06:17

## 2020-10-27 RX ADMIN — QUETIAPINE FUMARATE 100 MG: 100 TABLET ORAL at 13:51

## 2020-10-27 RX ADMIN — NICOTINE 1 PATCH: 21 PATCH, EXTENDED RELEASE TRANSDERMAL at 07:43

## 2020-10-27 RX ADMIN — HYDROXYZINE HYDROCHLORIDE 50 MG: 25 TABLET, FILM COATED ORAL at 16:06

## 2020-10-27 RX ADMIN — QUETIAPINE FUMARATE 100 MG: 100 TABLET ORAL at 20:15

## 2020-10-27 RX ADMIN — MAGNESIUM OXIDE 400 MG: 400 TABLET ORAL at 07:39

## 2020-10-27 RX ADMIN — VANCOMYCIN HYDROCHLORIDE 125 MG: 125 CAPSULE ORAL at 07:40

## 2020-10-27 RX ADMIN — PANTOPRAZOLE SODIUM 40 MG: 40 TABLET, DELAYED RELEASE ORAL at 07:40

## 2020-10-27 ASSESSMENT — ACTIVITIES OF DAILY LIVING (ADL)
ADLS_ACUITY_SCORE: 12
ADLS_ACUITY_SCORE: 11

## 2020-10-27 NOTE — PROGRESS NOTES
[facility]    Hospitalist Progress Note    Assessment & Plan   Jim Richard is a 65 year old gentleman with substance use disorder, cirrhosis, depression, recent hospitalization for intentional overdose, and multiple hospitalizations for intoxication who was admitted again on 10/14/2020, due to continued drinking and suicidal ideation.  Recently hospitalized from 10/6 - 10/11 for alcohol intoxication with metabolic acidosis and C. difficile.  Current problems include:  Admission 10/14/20.     Depression, anxiety, suicidal ideation   Acute alcohol intoxication  Severe alcohol use disorder   - multiple recent admission for intoxication at risk of life-threateningillness/death  -Patient has been through treatment multiple times in the past. He states he stating drinking again right after discharge. BAL 0.36 at admission.    - CIWA stopped  - Psychiatry involved --> has initiated a commitment process due to his recidivism, inability to maintain sobriety, multiple admissions secondary to alcohol-related issues and expressing suicidal ideation while intoxicated.    -mood seems stable yesterday but reports worsening anxiety and request to meet with psychiatry  -pt is NOT on a hold. He has been voluntary during his stay. Pt agreed to door alarm.   -pt passed prepetition process and now move to courts to decide. Pt case may be lower priority at this time as pt is not on hold.     Plan:   - Continue PTA mirtazapine, trintellix,   -cont prnseroquel and Trazodone, Atarax as needed as well. seroquel increasd to 100mg bid and 100mg q4 hours prn per psychiatry on 10/26 for increased anxiety  - reconsult with Psychiatry once the courts had made a determination regarding the petition for commitment or sooner as needed.     Alcoholic cirrhosis with history of esophageal varices and ascites -   Elevated Cr  -History of GI bleed.  Grade 1 esophageal varices with portal gastropathy. He c/o diffuse  abdominal discomfort and has obvious fluid wave on exam with diffuse mild tenderness on exam. Last paracentesis on 10/9 was negative for SBP and patient currently refusing paracentesis stating pain no different now than prior to that procedure.   -S/p paracentesis 10/19. 3.6 L removed. Fluid cx ngtd  -Pt had IV contrast on CT abd 10/6.   -Wt stable to down  - Cr up ~1.6 on admission---> 2--> 2.3 X2.   - lasix/aldactone resumed on 10/21  -having about 2L fluid per day, good po intake and fluid intake  -probably fluid wave, may need paracentesis in coming week but want to ensure Cr stable to improved.  - no formal primary GI. Seen by [facility] in past.      Plan:  -Lona, Uosm, serum osm this am  - follow for need of consult with Dr. Shaw, needs primary GI provider  - holding lasix and spironolactone since 10/26  - continue PPI   - continue PTA potassium BID- holding since 10/26  - continue PTA atenolol   -am bmp    Cr down with holding diuretics.    Am labs and restart diuretics tomorrow.      Poor PO intake; concerned about possible malnutrition.   - RD consult --> Non-Severe malnutrition  In Context of:  Chronic illness or disease, Environmental or social circumstances  -states he has good appetite and taking po now     Lactic acidosis secondary to above.  Hemodynamically stable with low suspicion for infection / sepsis  - continue current monitoring     C. difficile colitis, diagnosed during his last admission CT A/P with circumferential wall thickening of rectum suggesting distal colitis/proctitis. procalcitonin low at 0.07. WBC 7.7. Afebrile.  BM's mainly formed now.  negattive stool panel.     -diarrhea resolved. Benign abd exam. No abd pain    Plan;   - oral vancomycin 125 mg every 6 hours with plan to continue to complete 10 more days as ordered at discharge.  -stop prn narcotics.      NGUYEN in setting of Chronic kidney disease stage III  Cr 1.63, Baseline creatinine 1.4-1.9.   - repeat BMP 10/25  -Cr stable  2.3 range  -on lasix, Kdur and aldactone  - am bmp  -Cr up to 2.3 and stable X 2  - cont to hold diuretics and Kdur- last dose 10/25.   -bmp, urine lytes today.   -PVR- 190 cc     Chronic anemia, nl MCV, stable  - hgb 10.2       BPH  -Continue flomax     JANIS,   - home CPAP ordered        DVT Prophylaxis: Pneumatic Compression Devices  Code Status: Full Code  Expected discharge: TBD pending Psychiatry and commitment process, SW involved and following  Case discussed       Hasmukh Osullivan MD  Text Page  (7am to 6pm)  Interval History   No n/v/d/abd pain. Good appetite and taking po.   Feels his anxiety is worse  Requests to see psychiatry  No complaints today. No abd pain, abd girth no feel bigger  Had about 3L+ liters fluid yesterday.       -Data reviewed today: I reviewed all new labs and imaging results over the last 24 hours. I personally reviewed labs from today    Physical Exam   Temp: 98.7  F (37.1  C) Temp src: Oral BP: 138/70 Pulse: (!) 47   Resp: 18 SpO2: 94 % O2 Device: None (Room air)    Vitals:    10/23/20 0500 10/25/20 0619 10/27/20 0618   Weight: 80 kg (176 lb 6.4 oz) 80.5 kg (177 lb 7.5 oz) 79.9 kg (176 lb 1.6 oz)     Vital Signs with Ranges  Temp:  [97.7  F (36.5  C)-98.7  F (37.1  C)] 98.7  F (37.1  C)  Pulse:  [46-53] 47  Resp:  [18] 18  BP: (111-141)/(62-70) 138/70  SpO2:  [94 %-96 %] 94 %  I/O last 3 completed shifts:  In: 1360 [P.O.:1360]  Out: 1875 [Urine:1875]    Constitutional: Up in chair, nad  Respiratory: CTAB  Cardiovascular: RRR no r./g/m, no edema  GI: soft, nt, nd, ? Fluid wave  Skin/Integumen: no rash or edema  Neuro/Psych: nl affect, fully oriented, seems more anxious irritable today, cooperative. Nl speech and mentation. No tremor      Medications     - MEDICATION INSTRUCTIONS -         atenolol  25 mg Oral Daily     folic acid  1 mg Oral Daily     [Held by provider] furosemide  10 mg Oral Daily     lidocaine  1 patch Transdermal Q24h    And     lidocaine   Transdermal Q8H      magnesium oxide  400 mg Oral Daily     mirtazapine  30 mg Oral At Bedtime     multivitamin w/minerals  1 tablet Oral Daily     nicotine  1 patch Transdermal Daily     nicotine   Transdermal Q8H     pantoprazole  40 mg Oral BID     [Held by provider] potassium chloride ER  20 mEq Oral BID     sodium chloride (PF)  3 mL Intracatheter Q8H     [Held by provider] spironolactone  25 mg Oral Daily     tamsulosin  0.4 mg Oral Daily     vancomycin  125 mg Oral 4x Daily     vortioxetine  10 mg Oral Daily       Data   Recent Labs   Lab 10/26/20  0903 10/25/20  0757 10/24/20  1221 10/21/20  0912   WBC  --  3.3*  --  5.1   HGB  --  8.6*  --  9.1*   MCV  --  90  --  92   PLT  --  190  --  179   * 132* 133 137   POTASSIUM 4.6 4.8 4.9 4.4   CHLORIDE 102 104 104 110*   CO2 21 20 23 23   BUN 29 32* 32* 25   CR 2.33* 2.33* 2.29* 2.16*   ANIONGAP 6 8 6 4   KEV 7.9* 8.0* 8.4* 8.2*   * 88 83 98       Imaging:   No results found for this or any previous visit (from the past 24 hour(s)).

## 2020-10-27 NOTE — PLAN OF CARE
"DATE & TIME: 10/28/20 [id number]   Cognitive Concerns/ Orientation : A&O x4. Anxious at times but improved today.  BEHAVIOR & AGGRESSION TOOL COLOR: Green this shift   CIWA SCORE: n/a  ABNL VS/O2: VSS on RA except bradycardic (HR 40's, atenolol held)  MOBILITY: independent in room and up in muniz independent with walker.  PAIN MANAGMENT: back pain, states \"the usual\"  DIET: 2g Na; good intake  BOWEL/BLADDER: Continent, up to bathroom; \"normal BM\" x 2 today  ABNL LAB/BG: Na 130; Creat 2.10; slightly improved  DRAIN/DEVICES: PIV, saline locked RA  TELEMETRY RHYTHM: n/a  SKIN: Bruises and scabbed abrasions on BUE. Trace edema to BLE. Puncture site on R abdomen small bruise   TESTS/PROCEDURES: n/a  D/C DAY/GOALS/PLACE: pending commitment, SW following   OTHER IMPORTANT INFO: Enteric precautions maintained. Door alarm discontinued so patient can ambulate in muniz independently; not to walk beyond unit desk;  contract to be reminded daily; clothes/shoes locked in locker #10 room 630.  Seroquel x 1 for anxiety at 1400.  "

## 2020-10-27 NOTE — PLAN OF CARE
"DATE & TIME: 10/26/2020; [id number]   Cognitive Concerns/ Orientation : A & O x 4; calm and cooperative; pleasant  BEHAVIOR & AGGRESSION TOOL COLOR: Green   CIWA SCORE: N/A   ABNL VS/O2: VSS on RA except bradycardic (46-50); no signs of cardiac distress, pt. Stated \"my heart rate is always low\"   MOBILITY: independent in the room; encouraging ambulation in the room and halls  PAIN MANAGMENT: denies   DIET: 2 g Na+ diet; tolerating well   BOWEL/BLADDER: BS active x 4; continent; pt. Reported normal stool, no diarrhea at this time; adequate voiding this evening, pt. Stated \"I have always had retention\"     ABNL LAB/BG: Na+ = 129; creatinine= 2.33  DRAIN/DEVICES: PIV/SL   TELEMETRY RHYTHM: N/A  SKIN: Scattered bruising and abrasions on UE bilaterally; L. Ankle edema +2; puncture site on R. Abdomen (CDI).   TESTS/PROCEDURES: N/A  D/C DAY/GOALS/PLACE: pending discharge  OTHER IMPORTANT INFO: pt. Bladder scanned x 1 per MD request, page if volume greater than 299 (volume=341, MD notified, re-check at 2145= 218); PRN Seroquel given x 1; enteric precautions maintained (C.diff); door alarm in place; denies suicidal ideations during this shift. PT/OT/Psych/CD following.     MD/RN ROUNDING SIGNED OFF D/E SHIFT: N/A  COMMIT TO SIT DONE AND SIGNED OFF: COMPLETE                  "

## 2020-10-27 NOTE — PROGRESS NOTES
MD Notification    Notified Person: MD    Notified Person Name: Dr. Schwab     Notification Date/Time: 10/26/2020; 1953    Notification Interaction: web-based paging     Purpose of Notification: Hello, MD requested to bladder scan pt. and page if greater than 299. Pt. bladder scan reading was 341. pt. has been voiding this evening. Any recommendations at this time? Thank you!     Orders Received: monitor urine output this evening; Check PVR again. Page if bladder scan greater than 350 mL.     Comments:    Janeth Mojica RN on 10/26/2020 at 8:17 PM

## 2020-10-27 NOTE — PROGRESS NOTES
"Patient has repeatedly asked to be able to ambulate hallway independently with his walker and have the door opened.  Patient states he will not leave and appears to be sincere.  He stated \"take my clothes away if you want\".  In speaking with charge nurse we will allow patient to ambulate out of room independently without door alarm and contract patient daily about this.  He was notified he could not ambulate past the nurses desk to the elevator area but remain in the other 2 hallways (north and south).  Due to cdiff, he was told to wash his hands and wear a new robe and/or gown with each time out of his room.  His shoes and clothing were locked in locker #10 room 630.  Patient in agreement.    "

## 2020-10-27 NOTE — PLAN OF CARE
Cognitive Concerns/ Orientation : A&O x4. Anxious at times.   BEHAVIOR & AGGRESSION TOOL COLOR: Green this shift (previously red)  CIWA SCORE: n/a  ABNL VS/O2: VSS on RA except bradycardic (HR 50s)  MOBILITY: independent in room   PAIN MANAGMENT: denies pain  DIET: 2g Na    BOWEL/BLADDER: Continent, up to bathroom and voiding in urinal to monitor output.     ABNL LAB/BG: Na 129; Creat 2.33  DRAIN/DEVICES: PIV, saline locked   TELEMETRY RHYTHM: n/a  SKIN: Bruises and abrasions on BUE. Trace edema to BLE. Puncture site on R abd, CDI.   TESTS/PROCEDURES: n/a  D/C DAY/GOALS/PLACE: pending commitment, SW following   OTHER IMPORTANT INFO: Enteric precautions maintained. Commit in process, door alarm in place. Atarax x1.

## 2020-10-28 LAB
ANION GAP SERPL CALCULATED.3IONS-SCNC: 8 MMOL/L (ref 3–14)
BUN SERPL-MCNC: 28 MG/DL (ref 7–30)
CALCIUM SERPL-MCNC: 8.6 MG/DL (ref 8.5–10.1)
CHLORIDE SERPL-SCNC: 102 MMOL/L (ref 94–109)
CO2 SERPL-SCNC: 20 MMOL/L (ref 20–32)
CREAT SERPL-MCNC: 2.13 MG/DL (ref 0.66–1.25)
GFR SERPL CREATININE-BSD FRML MDRD: 31 ML/MIN/{1.73_M2}
GLUCOSE SERPL-MCNC: 144 MG/DL (ref 70–99)
POTASSIUM SERPL-SCNC: 4.3 MMOL/L (ref 3.4–5.3)
SODIUM SERPL-SCNC: 130 MMOL/L (ref 133–144)

## 2020-10-28 PROCEDURE — 250N000013 HC RX MED GY IP 250 OP 250 PS 637: Performed by: INTERNAL MEDICINE

## 2020-10-28 PROCEDURE — 99232 SBSQ HOSP IP/OBS MODERATE 35: CPT | Performed by: INTERNAL MEDICINE

## 2020-10-28 PROCEDURE — 250N000013 HC RX MED GY IP 250 OP 250 PS 637: Performed by: PSYCHIATRY & NEUROLOGY

## 2020-10-28 PROCEDURE — 250N000013 HC RX MED GY IP 250 OP 250 PS 637: Performed by: STUDENT IN AN ORGANIZED HEALTH CARE EDUCATION/TRAINING PROGRAM

## 2020-10-28 PROCEDURE — 36415 COLL VENOUS BLD VENIPUNCTURE: CPT | Performed by: INTERNAL MEDICINE

## 2020-10-28 PROCEDURE — 80048 BASIC METABOLIC PNL TOTAL CA: CPT | Performed by: INTERNAL MEDICINE

## 2020-10-28 PROCEDURE — 120N000001 HC R&B MED SURG/OB

## 2020-10-28 RX ORDER — FUROSEMIDE 20 MG
20 TABLET ORAL DAILY
Status: DISCONTINUED | OUTPATIENT
Start: 2020-10-28 | End: 2020-10-29

## 2020-10-28 RX ORDER — VANCOMYCIN HYDROCHLORIDE 125 MG/1
125 CAPSULE ORAL 4 TIMES DAILY
Status: COMPLETED | OUTPATIENT
Start: 2020-10-28 | End: 2020-10-28

## 2020-10-28 RX ORDER — SPIRONOLACTONE 25 MG/1
25 TABLET ORAL DAILY
Status: DISCONTINUED | OUTPATIENT
Start: 2020-10-28 | End: 2020-10-29

## 2020-10-28 RX ADMIN — NICOTINE 1 PATCH: 21 PATCH, EXTENDED RELEASE TRANSDERMAL at 08:36

## 2020-10-28 RX ADMIN — HYDROXYZINE HYDROCHLORIDE 50 MG: 25 TABLET, FILM COATED ORAL at 21:37

## 2020-10-28 RX ADMIN — MAGNESIUM OXIDE 400 MG: 400 TABLET ORAL at 08:36

## 2020-10-28 RX ADMIN — PANTOPRAZOLE SODIUM 40 MG: 40 TABLET, DELAYED RELEASE ORAL at 08:36

## 2020-10-28 RX ADMIN — QUETIAPINE FUMARATE 100 MG: 100 TABLET ORAL at 16:44

## 2020-10-28 RX ADMIN — HYDROXYZINE HYDROCHLORIDE 50 MG: 25 TABLET, FILM COATED ORAL at 14:36

## 2020-10-28 RX ADMIN — VANCOMYCIN HYDROCHLORIDE 125 MG: 125 CAPSULE ORAL at 08:36

## 2020-10-28 RX ADMIN — FOLIC ACID 1 MG: 1 TABLET ORAL at 08:36

## 2020-10-28 RX ADMIN — VANCOMYCIN HYDROCHLORIDE 125 MG: 125 CAPSULE ORAL at 18:45

## 2020-10-28 RX ADMIN — MIRTAZAPINE 30 MG: 15 TABLET, FILM COATED ORAL at 21:34

## 2020-10-28 RX ADMIN — HYDROXYZINE HYDROCHLORIDE 50 MG: 25 TABLET, FILM COATED ORAL at 09:39

## 2020-10-28 RX ADMIN — PANTOPRAZOLE SODIUM 40 MG: 40 TABLET, DELAYED RELEASE ORAL at 21:35

## 2020-10-28 RX ADMIN — FUROSEMIDE 20 MG: 20 TABLET ORAL at 12:07

## 2020-10-28 RX ADMIN — TAMSULOSIN HYDROCHLORIDE 0.4 MG: 0.4 CAPSULE ORAL at 08:36

## 2020-10-28 RX ADMIN — VANCOMYCIN HYDROCHLORIDE 125 MG: 125 CAPSULE ORAL at 21:34

## 2020-10-28 RX ADMIN — QUETIAPINE FUMARATE 100 MG: 100 TABLET ORAL at 12:08

## 2020-10-28 RX ADMIN — LIDOCAINE 1 PATCH: 560 PATCH PERCUTANEOUS; TOPICAL; TRANSDERMAL at 21:32

## 2020-10-28 RX ADMIN — SPIRONOLACTONE 25 MG: 25 TABLET, FILM COATED ORAL at 12:07

## 2020-10-28 RX ADMIN — VORTIOXETINE 10 MG: 10 TABLET, FILM COATED ORAL at 08:35

## 2020-10-28 RX ADMIN — TRAZODONE HYDROCHLORIDE 100 MG: 100 TABLET ORAL at 21:37

## 2020-10-28 RX ADMIN — ATENOLOL 25 MG: 25 TABLET ORAL at 08:36

## 2020-10-28 RX ADMIN — MULTIPLE VITAMINS W/ MINERALS TAB 1 TABLET: TAB at 08:36

## 2020-10-28 RX ADMIN — VANCOMYCIN HYDROCHLORIDE 125 MG: 125 CAPSULE ORAL at 12:07

## 2020-10-28 ASSESSMENT — ACTIVITIES OF DAILY LIVING (ADL)
ADLS_ACUITY_SCORE: 12
ADLS_ACUITY_SCORE: 12
ADLS_ACUITY_SCORE: 11

## 2020-10-28 NOTE — PROGRESS NOTES
"CLINICAL NUTRITION SERVICES - REASSESSMENT NOTE    Malnutrition: (10/16)   % Weight Loss:  Weight loss does not meet criteria for malnutrition   % Intake:  Decreased intake does not meet criteria for malnutrition - pt states was eating meals TID at last facility   Subcutaneous Fat Loss:  Orbital region mild depletion and Upper arm region mild depletion  Muscle Loss:  Temporal region mild depletion, Clavicle bone region mild depletion and Dorsal hand region mild depletion  Fluid Retention:  None noted     Malnutrition Diagnosis: Non-Severe malnutrition  In Context of:  Chronic illness or disease  Environmental or social circumstances     EVALUATION OF PROGRESS TOWARD GOALS   Diet: 2g Na diet   Chocolate Milkshake + 2 pkts of beneprotein w/ meals     Intake/Tolerance:   - Patient up in chair just prior to lunch. He states that his appetite has been good, the food has been fine. States that food quality \"depends\" on what it is, and who made it.   - Complains that the 2g Na diet is restrictive, but understands the rationale.     ASSESSED NUTRITION NEEDS:  Dosing Weight 70.7 kg - adjusted   Estimated Energy Needs: 6782-0287 kcals (25-30 Kcal/Kg)  Justification: maintenance  Estimated Protein Needs:  grams protein (1.2-1.5 g pro/Kg)  Justification: preservation of lean body mass  Estimated Fluid Needs: 1 mL/kcal  Justification: maintenance    NEW FINDINGS:   - No recent stooling documented. +CDiff   - Labs  Na 130 (L)  Cr 2.13 (H)  - Meds  Folic acid 1 mg  Lasix 20 mg daily - diuretic   Mag-ox 400 mg daily   Remeron 30 mg q HS  Thera vit M daily     Previous Goals:   Intake of >75% meals TID.  Evaluation: Met    Previous Nutrition Diagnosis:   No nutrition diagnosis identified at this time   Evaluation: No change    CURRENT NUTRITION DIAGNOSIS  No nutrition diagnosis identified at this time     INTERVENTIONS  Recommendations / Nutrition Prescription  2g Na diet for fluid retention  Continue milkshakes BID w/ meals "     Implementation  None new    Goals  Intake of >75% meals TID.       MONITORING AND EVALUATION:  Progress towards goals will be monitored and evaluated per protocol and Practice Guidelines    Summer Isaac RD, LD  [facility] [place], 65, 54, MH   Pager: [phone]  Weekend Pager: [phone]

## 2020-10-28 NOTE — PLAN OF CARE
Alert and Orientated. VSS. Tolerating diet. Regular bowel movements. Voiding. Up independent in the halls. Pt acknowledge he can walk around the nursing desk   Pt using meditation apps and reading AA material in room. Suggested pt to utilize [facility] [employer] online books. Pt seems estranged from family. Identifies his brother as his support system.      Plan to start oral diuretics in the morning. SW following for commitment process.

## 2020-10-28 NOTE — PLAN OF CARE
DATE & TIME: 10/28/2020 8796-1704  Cognitive Concerns/ Orientation : Alert/Oriented x 4, anxious at times (Atarax available 3x/day and bedtime, given 2x & Seroquel x1)  BEHAVIOR & AGGRESSION TOOL COLOR: Green   ABNL VS/O2: Heart rate 55, otherwise VSS, RA  MOBILITY: Independent in room; walker when walking on unit  PAIN MANAGMENT: Slight LL chronic back pain.  DIET: 2 gram sodium, large appetite  BOWEL/BLADDER: Continent, up to bathroom  ABNL LAB/BG: Na 130; Creat 2.13  DRAIN/DEVICES: R PIV saline locked  SKIN: Skin tear/scab on R arm, healing; BUE bruising.   TESTS/PROCEDURES: n/a  D/C DAY/GOALS/PLACE: Pending commitment process and psychiatry.  OTHER IMPORTANT INFO: Enteric precautions maintained for C. Diff. SW/Psych following. PT/OT determined no further needs at this time. Pt understands that he is not allowed past the nursing station but can walk around it and down the Red and Blue wings. He states that he will comply. Nicotine patch on R arm.  COMMIT TO SIT DONE AND SIGNED OFF YES

## 2020-10-28 NOTE — PROGRESS NOTES
[facility]    Hospitalist Progress Note    Assessment & Plan   Jim Richard is a 65 year old gentleman with substance use disorder, cirrhosis, depression, recent hospitalization for intentional overdose, and multiple hospitalizations for intoxication who was admitted again on 10/14/2020, due to continued drinking and suicidal ideation.  Recently hospitalized from 10/6 - 10/11 for alcohol intoxication with metabolic acidosis and C. difficile.  Current problems include:  Admission 10/14/20.     Depression, anxiety, suicidal ideation   Acute alcohol intoxication  Severe alcohol use disorder   - multiple recent admission for intoxication at risk of life-threateningillness/death  -Patient has been through treatment multiple times in the past. He states he stating drinking again right after discharge. BAL 0.36 at admission.    - CIWA stopped  - Psychiatry involved --> has initiated a commitment process due to his recidivism, inability to maintain sobriety, multiple admissions secondary to alcohol-related issues and expressing suicidal ideation while intoxicated.    -mood seems stable yesterday but reports worsening anxiety and request to meet with psychiatry  -pt is NOT on a hold. He has been voluntary during his stay. Pt agreed to door alarm.   -pt passed prepetition process and now move to courts to decide. Pt case may be lower priority at this time as pt is not on hold.   -states to RN that he wants to be in hospital.   -states mood is improved with med changes from psych 2 days ago    Plan:   - Continue PTA mirtazapine, trintellix,   -cont prnseroquel and Trazodone, Atarax as needed as well. seroquel increasd to 100mg bid and 100mg q4 hours prn per psychiatry on 10/26 for increased anxiety  - reconsult with Psychiatry once the courts had made a determination regarding the petition for commitment or sooner as needed.     Alcoholic cirrhosis with history of esophageal varices and  ascites -   Elevated Cr  Hyponatremia- possibly secondary to cirrhosis  -History of GI bleed.  Grade 1 esophageal varices with portal gastropathy. He c/o diffuse abdominal discomfort and has obvious fluid wave on exam with diffuse mild tenderness on exam. Last paracentesis on 10/9 was negative for SBP and patient currently refusing paracentesis stating pain no different now than prior to that procedure.   -S/p paracentesis 10/19. 3.6 L removed. Fluid cx ngtd  -Pt had IV contrast on CT abd 10/6.   -Wt stable to down  - Cr up ~1.6 on admission---> 2--> 2.3 X2.   - lasix/aldactone resumed on 10/21  -having about 2L fluid per day, good po intake and fluid intake  -probably fluid wave, may need paracentesis in coming week but want to ensure Cr stable to improved.  - no formal primary GI. Seen by [facility] in past.    - 10/26 and 10/27: serum osm 277-280, Lona 31 and 49 (after 1-2 days holding diuretic), Uosmol 221 and 237.    Plan:  -will restart PtA lasix with kdur, aldactone today  - will consult Dr. Shaw to reassess his cirrhosis, diuretics dose and outpatient GI follow up.    - restart lasix and spironolactone - have held since 10/26  - continue PPI   - stop PTA potassium BID- have been holding with along with lasix. Probably not need so will stop  - continue PTA atenolol   -am bmp    Cr down with holding diuretics.    Am labs and restart diuretics tomorrow.      Poor PO intake-resolved   ; concerned about possible malnutrition on admission   - RD consult --> Non-Severe malnutrition  In Context of:  Chronic illness or disease, Environmental or social circumstances  -states he has good appetite and taking po now    - will check prealbumin tomorrow     Lactic acidosis secondary to above.  Hemodynamically stable with low suspicion for infection / sepsis  - continue current monitoring     C. difficile colitis, diagnosed during his last admission CT A/P with circumferential wall thickening of rectum suggesting distal  colitis/proctitis. procalcitonin low at 0.07. WBC 7.7. Afebrile.  BM's mainly formed now.  negattive stool panel.     -diarrhea resolved. Benign abd exam. No abd pain  -day 14 of po vancomycin today    Plan;   - oral vancomycin 125 mg every 6 hours -will stop after doses today as this is day 14.  -stopped prn narcotics.   -judicious use of abx in future. Po vanco for prophylaxis in future if need antibiotics     NGUYEN in setting of Chronic kidney disease stage III  Cr 1.63, Baseline creatinine 1.4-1.9.   - repeat BMP 10/25  -Cr stable 2.3 range  -on lasix, Kdur and aldactone  - am bmp  -Cr up to 2.3 and stable X 2  - cont to hold diuretics and Kdur- last dose 10/25.   -bmp, urine lytes today.   -PVR- 190 ml  -bun/cr stable to slightly improved with holding diuretics for 2 days. Hyponatremia stable.   Plan:   Will restart his PTA Lasix 10mg every day, aldactone 25mg bid  and repeat bmp in am.   -will hold on restarting PTA Kdur bid that he takes with lasix as pt eating well and on aldactone, restart if poor po intake or K level running low     Chronic anemia, nl MCV, stable  - hgb 10.2       BPH  -Continue flomax     JANIS,   - home CPAP ordered        DVT Prophylaxis: Pneumatic Compression Devices  Code Status: Full Code  Expected discharge: TBD pending Psychiatry and commitment process, SW involved and following  Case discussed with SW this week       Hasmukh Osullivan MD  Text Page  (7am to 6pm)  Interval History   No n/v/d/abd pain. Good appetite and taking po.   Feels anxiety is better with medication changes per psych 2 days ago.         -Data reviewed today: I reviewed all new labs and imaging results over the last 24 hours. I personally reviewed labs from today    Physical Exam   Temp: 98.3  F (36.8  C) Temp src: Oral BP: (!) 149/67 Pulse: 55   Resp: 16 SpO2: 96 % O2 Device: None (Room air)    Vitals:    10/25/20 0619 10/27/20 0618 10/28/20 0500   Weight: 80.5 kg (177 lb 7.5 oz) 79.9 kg (176 lb 1.6 oz) 79.8 kg  (175 lb 14.8 oz)     Vital Signs with Ranges  Temp:  [97.8  F (36.6  C)-98.3  F (36.8  C)] 98.3  F (36.8  C)  Pulse:  [47-62] 55  Resp:  [16] 16  BP: (121-149)/(63-68) 149/67  SpO2:  [95 %-98 %] 96 %  No intake/output data recorded.    Constitutional: Up in chair, nad, up ambulating  Respiratory: CTAB  Cardiovascular: RRR no r./g/m, no edema  GI: soft, nt, nd,  Skin/Integumen: no rash and no edema  Neuro/Psych: nl affect, fully oriented, calm, pleasant, cooperative. Nl speech and mentation. No tremor      Medications     - MEDICATION INSTRUCTIONS -         atenolol  25 mg Oral Daily     folic acid  1 mg Oral Daily     furosemide  20 mg Oral Daily     lidocaine  1 patch Transdermal Q24h    And     lidocaine   Transdermal Q8H     magnesium oxide  400 mg Oral Daily     mirtazapine  30 mg Oral At Bedtime     multivitamin w/minerals  1 tablet Oral Daily     nicotine  1 patch Transdermal Daily     nicotine   Transdermal Q8H     pantoprazole  40 mg Oral BID     sodium chloride (PF)  3 mL Intracatheter Q8H     spironolactone  25 mg Oral Daily     tamsulosin  0.4 mg Oral Daily     vancomycin  125 mg Oral 4x Daily     vortioxetine  10 mg Oral Daily       Data   Recent Labs   Lab 10/28/20  0923 10/27/20  1006 10/26/20  0903 10/25/20  0757   WBC  --   --   --  3.3*   HGB  --   --   --  8.6*   MCV  --   --   --  90   PLT  --   --   --  190   * 130* 129* 132*   POTASSIUM 4.3 4.4 4.6 4.8   CHLORIDE 102 101 102 104   CO2 20 21 21 20   BUN 28 27 29 32*   CR 2.13* 2.10* 2.33* 2.33*   ANIONGAP 8 8 6 8   KEV 8.6 8.5 7.9* 8.0*   * 118* 135* 88       Imaging:   No results found for this or any previous visit (from the past 24 hour(s)).

## 2020-10-28 NOTE — PROGRESS NOTES
DATE & TIME: 10/27/2020 Night    Cognitive Concerns/ Orientation : Alert/Oriented x 4, anxious at times (atarax available 3x/day and bedtime)   BEHAVIOR & AGGRESSION TOOL COLOR: Green   ABNL VS/O2: Heart rate 61, otherwise VSS, room air  MOBILITY: Independent in room; walker when walking on unit  PAIN MANAGMENT: Tylenol PRN (Q4 hours), Lidocaine patch on left, lower back  DIET: 2 gram sodium  BOWEL/BLADDER: Up to bathroom  ABNL LAB/BG: Na 130; Creat 2.10; GFR 32  DRAIN/DEVICES: R PIV saline locked  SKIN: Skin tear/scab on R arm; BUE bruising.   TESTS/PROCEDURES: n/a  D/C DAY/GOALS/PLACE: Pending commitment process and psychiatry  OTHER IMPORTANT INFO: Seroquel available 4x/day. Enteric precautions maintained for C. Diff. SW/Psych following. PT/OT determined no further needs at this time. Pt understands that he is not allowed past the nursing station but can walk around it and down the Red and Blue wings. He states that he will comply. Nicotine patch on L arm  COMMIT TO SIT DONE AND SIGNED OFF YES

## 2020-10-29 LAB
ALBUMIN SERPL-MCNC: 2.8 G/DL (ref 3.4–5)
ALP SERPL-CCNC: 173 U/L (ref 40–150)
ALT SERPL W P-5'-P-CCNC: 20 U/L (ref 0–70)
ANION GAP SERPL CALCULATED.3IONS-SCNC: 7 MMOL/L (ref 3–14)
ANION GAP SERPL CALCULATED.3IONS-SCNC: 7 MMOL/L (ref 3–14)
AST SERPL W P-5'-P-CCNC: 22 U/L (ref 0–45)
BILIRUB SERPL-MCNC: 0.3 MG/DL (ref 0.2–1.3)
BUN SERPL-MCNC: 33 MG/DL (ref 7–30)
BUN SERPL-MCNC: 34 MG/DL (ref 7–30)
CALCIUM SERPL-MCNC: 8.1 MG/DL (ref 8.5–10.1)
CALCIUM SERPL-MCNC: 8.1 MG/DL (ref 8.5–10.1)
CHLORIDE SERPL-SCNC: 101 MMOL/L (ref 94–109)
CHLORIDE SERPL-SCNC: 101 MMOL/L (ref 94–109)
CO2 SERPL-SCNC: 22 MMOL/L (ref 20–32)
CO2 SERPL-SCNC: 22 MMOL/L (ref 20–32)
CREAT SERPL-MCNC: 2.07 MG/DL (ref 0.66–1.25)
CREAT SERPL-MCNC: 2.11 MG/DL (ref 0.66–1.25)
GFR SERPL CREATININE-BSD FRML MDRD: 32 ML/MIN/{1.73_M2}
GFR SERPL CREATININE-BSD FRML MDRD: 32 ML/MIN/{1.73_M2}
GLUCOSE SERPL-MCNC: 124 MG/DL (ref 70–99)
GLUCOSE SERPL-MCNC: 125 MG/DL (ref 70–99)
INR PPP: 1.31 (ref 0.86–1.14)
MAGNESIUM SERPL-MCNC: 1.8 MG/DL (ref 1.6–2.3)
PHOSPHATE SERPL-MCNC: 4.3 MG/DL (ref 2.5–4.5)
POTASSIUM SERPL-SCNC: 4.2 MMOL/L (ref 3.4–5.3)
POTASSIUM SERPL-SCNC: 4.2 MMOL/L (ref 3.4–5.3)
PREALB SERPL IA-MCNC: 15 MG/DL (ref 15–45)
PROT SERPL-MCNC: 6.3 G/DL (ref 6.8–8.8)
SODIUM SERPL-SCNC: 130 MMOL/L (ref 133–144)
SODIUM SERPL-SCNC: 130 MMOL/L (ref 133–144)

## 2020-10-29 PROCEDURE — 250N000013 HC RX MED GY IP 250 OP 250 PS 637: Performed by: STUDENT IN AN ORGANIZED HEALTH CARE EDUCATION/TRAINING PROGRAM

## 2020-10-29 PROCEDURE — 250N000013 HC RX MED GY IP 250 OP 250 PS 637: Performed by: INTERNAL MEDICINE

## 2020-10-29 PROCEDURE — 36415 COLL VENOUS BLD VENIPUNCTURE: CPT | Performed by: PHYSICIAN ASSISTANT

## 2020-10-29 PROCEDURE — 84100 ASSAY OF PHOSPHORUS: CPT | Performed by: INTERNAL MEDICINE

## 2020-10-29 PROCEDURE — 250N000013 HC RX MED GY IP 250 OP 250 PS 637: Performed by: PSYCHIATRY & NEUROLOGY

## 2020-10-29 PROCEDURE — 84134 ASSAY OF PREALBUMIN: CPT | Performed by: INTERNAL MEDICINE

## 2020-10-29 PROCEDURE — 99232 SBSQ HOSP IP/OBS MODERATE 35: CPT | Performed by: INTERNAL MEDICINE

## 2020-10-29 PROCEDURE — 83735 ASSAY OF MAGNESIUM: CPT | Performed by: INTERNAL MEDICINE

## 2020-10-29 PROCEDURE — 85610 PROTHROMBIN TIME: CPT | Performed by: PHYSICIAN ASSISTANT

## 2020-10-29 PROCEDURE — 80048 BASIC METABOLIC PNL TOTAL CA: CPT | Performed by: INTERNAL MEDICINE

## 2020-10-29 PROCEDURE — 80053 COMPREHEN METABOLIC PANEL: CPT | Performed by: PHYSICIAN ASSISTANT

## 2020-10-29 PROCEDURE — 250N000013 HC RX MED GY IP 250 OP 250 PS 637: Performed by: PHYSICIAN ASSISTANT

## 2020-10-29 PROCEDURE — 36415 COLL VENOUS BLD VENIPUNCTURE: CPT | Performed by: INTERNAL MEDICINE

## 2020-10-29 PROCEDURE — 120N000001 HC R&B MED SURG/OB

## 2020-10-29 RX ORDER — NADOLOL 40 MG/1
40 TABLET ORAL DAILY
Status: DISCONTINUED | OUTPATIENT
Start: 2020-10-29 | End: 2020-11-18 | Stop reason: HOSPADM

## 2020-10-29 RX ADMIN — HYDROXYZINE HYDROCHLORIDE 50 MG: 25 TABLET, FILM COATED ORAL at 10:22

## 2020-10-29 RX ADMIN — HYDROXYZINE HYDROCHLORIDE 50 MG: 25 TABLET, FILM COATED ORAL at 00:13

## 2020-10-29 RX ADMIN — NICOTINE 1 PATCH: 21 PATCH, EXTENDED RELEASE TRANSDERMAL at 10:13

## 2020-10-29 RX ADMIN — FOLIC ACID 1 MG: 1 TABLET ORAL at 10:13

## 2020-10-29 RX ADMIN — MIRTAZAPINE 30 MG: 15 TABLET, FILM COATED ORAL at 21:44

## 2020-10-29 RX ADMIN — LIDOCAINE 1 PATCH: 560 PATCH PERCUTANEOUS; TOPICAL; TRANSDERMAL at 19:19

## 2020-10-29 RX ADMIN — QUETIAPINE FUMARATE 100 MG: 100 TABLET ORAL at 21:53

## 2020-10-29 RX ADMIN — QUETIAPINE FUMARATE 100 MG: 100 TABLET ORAL at 10:32

## 2020-10-29 RX ADMIN — QUETIAPINE FUMARATE 100 MG: 100 TABLET ORAL at 00:00

## 2020-10-29 RX ADMIN — VORTIOXETINE 10 MG: 10 TABLET, FILM COATED ORAL at 10:13

## 2020-10-29 RX ADMIN — PANTOPRAZOLE SODIUM 40 MG: 40 TABLET, DELAYED RELEASE ORAL at 21:44

## 2020-10-29 RX ADMIN — MAGNESIUM OXIDE 400 MG: 400 TABLET ORAL at 10:13

## 2020-10-29 RX ADMIN — ATENOLOL 25 MG: 25 TABLET ORAL at 10:13

## 2020-10-29 RX ADMIN — PANTOPRAZOLE SODIUM 40 MG: 40 TABLET, DELAYED RELEASE ORAL at 10:13

## 2020-10-29 RX ADMIN — NADOLOL 40 MG: 40 TABLET ORAL at 12:54

## 2020-10-29 RX ADMIN — CARBIDOPA AND LEVODOPA 7.5 MG: 50; 200 TABLET, EXTENDED RELEASE ORAL at 19:19

## 2020-10-29 RX ADMIN — HYDROXYZINE HYDROCHLORIDE 50 MG: 25 TABLET, FILM COATED ORAL at 16:37

## 2020-10-29 RX ADMIN — MULTIPLE VITAMINS W/ MINERALS TAB 1 TABLET: TAB at 10:13

## 2020-10-29 RX ADMIN — TRAZODONE HYDROCHLORIDE 100 MG: 100 TABLET ORAL at 21:45

## 2020-10-29 RX ADMIN — CARBIDOPA AND LEVODOPA 7.5 MG: 50; 200 TABLET, EXTENDED RELEASE ORAL at 12:54

## 2020-10-29 RX ADMIN — HYDROXYZINE HYDROCHLORIDE 50 MG: 25 TABLET, FILM COATED ORAL at 21:46

## 2020-10-29 RX ADMIN — QUETIAPINE FUMARATE 100 MG: 100 TABLET ORAL at 16:36

## 2020-10-29 RX ADMIN — TAMSULOSIN HYDROCHLORIDE 0.4 MG: 0.4 CAPSULE ORAL at 10:13

## 2020-10-29 ASSESSMENT — ACTIVITIES OF DAILY LIVING (ADL)
ADLS_ACUITY_SCORE: 11

## 2020-10-29 NOTE — PLAN OF CARE
DATE & TIME: 10/29/2020 [id number]  Cognitive Concerns/ Orientation : Alert x 4.  Frustrated and concerned that he has to go to court and stay in the hospital another week.    BEHAVIOR & AGGRESSION TOOL COLOR: Green     ABNL VS/O2: Bradycardic: HR 50's.   BP elevated, 150/70's.  96% on room air, lungs clear.  MOBILITY: Independent in room, uses walker when walking on unit.  PAIN MANAGMENT: Denies. Uses lidocaine patch on L lower back at night for chronic back pain.  DIET: 2 gram sodium-tolerating well.  BOWEL/BLADDER: reports voiding adequate in bathroom.  PO vanco for C-diff completed.  ABNL LAB/BG: Sodium 130, Crea 2.11,   DRAIN/DEVICES: R PIV saline locked  SKIN: Scab to L wrist; bruising on BUE  TESTS/PROCEDURES: n/a  D/C DAY/GOALS/PLACE: Pending commitment process and psychiatry  OTHER IMPORTANT INFO: Enteric precautions PRN Atarax and Seroquel given this shift.  Seen by GI, started on nadolol and midodrine.  COMMIT TO SIT DONE AND SIGNED OFF YES

## 2020-10-29 NOTE — CONSULTS
[facility]  Gastroenterology Consultation         Jim Richard  [address] [address]  65 year old male    Admission Date/Time: 10/14/2020  Primary Care Provider: Talon Elias  Referring / Attending Physician:  Dr. Hasmukh Osullivan    We were asked to see the patient in consultation by Dr. Hasmukh Osullivan for evaluation of f/u for liver cirrhosis and diuretic dosing.      CC:    HPI:   Jim Richard is a 65 year old male who has a past medical history of numerous admissions for alcohol intoxication complicated by decompensated alcoholic liver cirrhosis with esophageal varices, abdominal ascites, GI bleed, hepatic encephalopathy and hepatorenal syndrome as well as depression, anxiety, sleep apnea, COPD and anemia. He has been admitted for intentiona overdose. He has been non compliant with medications. He failed to f/u in the outpatient setting. He has been admitted 6 timesin last year with multiple ED visits.  Last EGD 06/2020 noted grade I esophageal varices. Last paracentesis 10/14 and removed 3.6 L of clear fluid.    Vital stable. Sodium 130, Creatinine 2.13 (baseline 1.4-1.8), albumin not checked since 10/19. WBC 3.3, Hemoglobin 8.6 (baseline 9-9.5). INR 1.37 ( last checked 10/19)    ROS: A comprehensive ten point review of systems was negative aside from those in mentioned in the HPI.      PAST MED HX:  I have reviewed this patient's medical history and updated it with pertinent information if needed.   Past Medical History:   Diagnosis Date     Alcoholism (H) 1/14/2013    had treatment at [facility]     Anxiety      Coronary artery disease 09/09/2014    Nuclear stress test with slight fixed defect inferiorly, no ischemia     Depression     w/anxiety     Esophageal varices with bleeding 04/28/2018    6 varices banded on EGD     Heart attack (H)      Hepatomegaly     Fatty liver, chronic alcoholic     Hyperlipemia      Hypertension      JANIS on  CPAP      Peripheral vascular disease (H)      PVD (peripheral vascular disease) (H)      SDH (subdural hematoma) (H) 04/26/2018    Right side, resolved spontaneously     Spinal stenosis      Substance abuse (H)        MEDICATIONS:   Prior to Admission Medications   Prescriptions Last Dose Informant Patient Reported? Taking?   QUEtiapine (SEROQUEL) 100 MG tablet 10/14/2020 at Unknown time Self Yes Yes   Sig: Take 100 mg by mouth 2 times daily as needed (moderate agitation)    atenolol (TENORMIN) 25 MG tablet 10/14/2020 at Unknown time Self Yes Yes   Sig: Take 25 mg by mouth daily   fluticasone-vilanterol (BREO ELLIPTA) 200-25 MCG/INH inhaler Past Week at Unknown time Self Yes Yes   Sig: Inhale 1 puff into the lungs daily as needed Patient stated that he takes as needed when smoking   folic acid (FOLVITE) 1 MG tablet 10/14/2020 at Unknown time Self Yes Yes   Sig: Take 1 mg by mouth daily   furosemide (LASIX) 20 MG tablet 10/14/2020 at Unknown time Self Yes Yes   Sig: Take 10 mg by mouth daily   hydrOXYzine (ATARAX) 50 MG tablet  Self Yes Yes   Sig: Take  mg by mouth 3 times daily as needed for itching   magnesium oxide (MAG-OX) 400 MG tablet 10/14/2020 at Unknown time Self Yes Yes   Sig: Take 400 mg by mouth daily   methocarbamol (ROBAXIN) 500 MG tablet 10/14/2020 at Unknown time Self Yes Yes   Sig: Take 500 mg by mouth 3 times daily as needed for muscle spasms   mirtazapine (REMERON) 30 MG tablet 10/14/2020 at Unknown time Self Yes Yes   Sig: Take 30 mg by mouth At Bedtime   multivitamin w/minerals (THERA-VIT-M) tablet 10/14/2020 at Unknown time Self No Yes   Sig: Take 1 tablet by mouth daily   pantoprazole (PROTONIX) 40 MG EC tablet 10/14/2020 at Unknown time Self Yes Yes   Sig: Take 40 mg by mouth 2 times daily    potassium chloride ER (KLOR-CON M) 10 MEQ CR tablet 10/14/2020 at Unknown time Self Yes Yes   Sig: Take 20 mEq by mouth 2 times daily   spironolactone (ALDACTONE) 25 MG tablet 10/14/2020 at Unknown  time Self No Yes   Sig: Take 1 tablet (25 mg) by mouth daily   tamsulosin (FLOMAX) 0.4 MG capsule 10/14/2020 at Unknown time Self Yes Yes   Sig: Take 0.4 mg by mouth daily   traZODone (DESYREL) 100 MG tablet 10/14/2020 at Unknown time Self Yes Yes   Sig: Take 100 mg by mouth nightly as needed for sleep   vancomycin (VANCOCIN) 125 MG capsule Past Week at Unknown time Self No Yes   Sig: Take 1 capsule (125 mg) by mouth 4 times daily   vortioxetine (TRINTELLIX) 10 MG tablet 10/14/2020 at Unknown time Self No Yes   Sig: Take 1 tablet (10 mg) by mouth daily      Facility-Administered Medications: None       ALLERGIES:   Allergies   Allergen Reactions     Amlodipine Swelling     Lisinopril      Other reaction(s): Angioedema  Mouth and tongue swelling   Mouth and tongue swelling          SOCIAL HISTORY:  Social History     Tobacco Use     Smoking status: Current Every Day Smoker     Packs/day: 1.00     Types: Cigarettes     Smokeless tobacco: Never Used     Tobacco comment: Chantix caused nightmares   Substance Use Topics     Alcohol use: Yes     Comment: hx etoh, drinks vodka      Drug use: No       FAMILY HISTORY:  Family History   Problem Relation Age of Onset     Mental Illness Son      Coronary Artery Disease Early Onset Mother      Substance Abuse Father      Lung Cancer Father      Substance Abuse Brother      Unknown/Adopted No family hx of      Depression No family hx of      Anxiety Disorder No family hx of      Schizophrenia No family hx of      Bipolar Disorder No family hx of      Suicide No family hx of      Dementia No family hx of      Nicholas Disease No family hx of      Parkinsonism No family hx of      Autism Spectrum Disorder No family hx of      Intellectual Disability (Mental Retardation) No family hx of        PHYSICAL EXAM:   General  Alert, oriented, and comfortable  Vital Signs with Ranges  Temp: 97.9  F (36.6  C) Temp src: Oral BP: (!) 150/79 Pulse: (!) 46   Resp: 18 SpO2: 98 % O2 Device: None  (Room air)    I/O last 3 completed shifts:  In: 240 [P.O.:240]  Out: -     Constitutional: healthy, alert, no distress and cooperative   Cardiovascular: negative, PMI normal. No lifts, heaves, or thrills. RRR. No murmurs, clicks gallops or rub  Respiratory: negative, Percussion normal. Good diaphragmatic excursion. Lungs clear  Head: Normocephalic. No masses, lesions, tenderness or abnormalities  Neck: Neck supple. No adenopathy. Thyroid symmetric, normal size,, Carotids without bruits.  Abdomen: Abdomen soft, non-tender. BS normal. No masses, organomegaly, positive findings: distended          ADDITIONAL COMMENTS:   I reviewed the patient's new clinical lab test results.   Recent Labs   Lab Test 10/25/20  0757 10/21/20  0912 10/19/20  1007 10/05/20  1901 10/05/20  1901 09/30/20  2138   WBC 3.3* 5.1 6.0   < > 7.2 6.9   HGB 8.6* 9.1* 8.6*   < > 10.3* 10.4*   MCV 90 92 92   < > 91 90    179 153   < > 202 182   INR  --   --  1.37*  --  1.22* 1.18*    < > = values in this interval not displayed.     Recent Labs   Lab Test 10/28/20  0923 10/27/20  1006 10/26/20  0903   POTASSIUM 4.3 4.4 4.6   CHLORIDE 102 101 102   CO2 20 21 21   BUN 28 27 29   ANIONGAP 8 8 6     Recent Labs   Lab Test 10/19/20  1007 10/18/20  1537 10/15/20  0952 10/14/20  1530 10/08/20  2340 10/08/20  2340 10/06/20  1610 10/06/20  1610 10/05/20  1901 10/05/20  1901 05/26/20  1612 05/26/20  1612 01/11/20  1730 01/11/20  1730   ALBUMIN 2.7* 2.5* 2.9* 3.0*   < >  --    < >  --    < > 3.3*   < >  --    < >  --    BILITOTAL 0.5  --  0.8 0.6  --   --   --   --    < > 0.9   < >  --    < >  --    ALT 19  --  18 20  --   --   --   --    < > 22   < >  --    < >  --    AST 28  --  33 36  --   --   --   --    < > 39   < >  --    < >  --    PROTEIN  --   --   --   --   --   --   --  10*  --   --   --  30*  --  Negative   LIPASE  --   --   --  236  --  298  --   --   --  348  --   --    < >  --     < > = values in this interval not displayed.       I reviewed  the patient's new imaging results.        CONSULTATION ASSESSMENT AND PLAN:    Jim Richard is a pleasant 65 year old male with alcoholic liver cirrhosis complicated with esophageal varices, abdominal ascites, hepatorenal syndrome, pancytopenia. He is also being treated for c difficile colitis. GI consulted for help with management of alcoholic liver cirrhosis.    Active Problems:    Alcoholic liver cirrhosis    Elevated Creatinine- hepatorenal syndrome    Abdominal ascites   Hypoalbuminemia    Pancytopenia  Patient has suffered from chronic alcoholism and developed significant complications with decompensated liver cirrhosis. He has required paracentesis every 1-4 weeks. Has had elevating creatinine. Over last 6 months has had Creatinine increase from 1.4 to 2.3. He has type 2 hepatorenal syndrome. Has pancytopenia due to bone marrow suppression.  --Due to worsening kidney function would recommend to discontinue all diuretics. He is unable to tolerate even low doses.   --He may require more frequent paracentesis. However, albumin infusions may help improve this. No albumin checked in last 10 days.CMP ordered.  -- Midodrine would help maximize kidney function. Start on midodrine 7.5 mg TID  -- Start nadolol ( BP ok at this time and should tolerate) 40 mg daily  -- Plan paracentesis as needed  -- Consider TIPS procedure in near future  -- Monitor CBC, CMP daily  -- Recheck INR  -- Regular diet and encourage good nutrition  -- Appreciate consult and appreciate psychiatry's help      C. difficile colitis  Asymptomatic. 3 stools daily.   Has been on vancomycin 125 mg QID         [facility].  Office: [phone]  Cell : [phone] (Dr. Shaw)  Cell: [phone] (Gloria De Leon PA-C)

## 2020-10-29 NOTE — PROGRESS NOTES
"DATE & TIME: 10/28/2020 Night   Cognitive Concerns/ Orientation : Alert/Oriented x 4. Pt is frustrated/discouraged--\"I got served papers today. I have to stay here another week.\"   BEHAVIOR & AGGRESSION TOOL COLOR: Green   ABNL VS/O2: Bradycardic: HR 50, /74 otherwise VSS, room air  MOBILITY: Independent in room, walker when walking on unit.  PAIN MANAGMENT: Denies. Lidocaine patch on L lower back (chronic pain)  DIET: 2 gram sodium  BOWEL/BLADDER: Up to bathroom  ABNL LAB/BG: Sodium 130, Crea 2.13, GFR 31  DRAIN/DEVICES: R PIV saline locked  SKIN: Scab to L wrist; bruising on BUE  TESTS/PROCEDURES: n/a  D/C DAY/GOALS/PLACE: Pending commitment process and psychiatry  OTHER IMPORTANT INFO: Enteric precautions maintained. Oral vancomycin complete. Atarax available 3x/day + bedtime; Seroquel available 4X/day. Pt understands that he is not allowed past the nursing station but can walk around it and down Red/Blue wings. He states that he will comply. Nicotine patch on R arm. PT/OT determined no further needs at this time. SW/Psych follwing.  COMMIT TO SIT DONE AND SIGNED OFF YES        "

## 2020-10-29 NOTE — PROGRESS NOTES
[facility]    Medicine Progress Note - Hospitalist Service       Date of Admission:  10/14/2020  Assessment & Plan      Jim Richard is a 65 year old male was admitted on 10/14/2020 with PMH use disorder, cirrhosis, depression, recent hospitalization for intentional overdose, multiple hospitalization for intoxication who is being admitted again on 10/14/2020, due to continued drinking and suicidal ideation, stating that he would take all of his medications at once.  Recently hospitalized from 10/6 - 10/11 for alcohol intoxication with metabolic acidosis and C. difficile.  After discharge he states he stopped taking all of his medications and has been drinking because he does not care whether he lives or dies.     Depression, anxiety, suicidal ideation   Acute alcohol intoxication  Severe alcohol use disorder - multiple recent admission for intoxication at risk of life-threateningillness/death  Patient has been through treatment multiple times in the past. He states he stating drinking again right after discharge. BAL 0.36 at admission.    - CIWA stopped  - Psychiatry was consulted-> has initiated a commitment process due to his recidivism, inability to maintain sobriety, multiple admissions secondary to alcohol-related issues and expressing suicidal ideation while intoxicated.    - Continue PTA mirtazapine, trintellix  - Court appearance is currently scheduled for next week      Alcoholic cirrhosis with history of esophageal varices and ascites   History of GI bleed.  Grade 1 esophageal varices with portal gastropathy. He c/o diffuse abdominal discomfort and has obvious fluid wave on exam with diffuse mild tenderness on exam. Last paracentesis on 10/9 was negative for SBP and patient currently refusing paracentesis stating pain no different now than prior to that procedure. S/p paracentesis 10/19.  - Holding lasix and spironolactone due to recent hyponatremia   - Continue PPI   - PTA  Atenolol   - Nadolol started today  - GI following and appreciate their recommendations    Mild hyponatremia  Sodiums drifted down to 130 and has remained there the past 3 days  - Continue to monitor      Lactic acidosis secondary to above  Hemodynamically stable with low suspicion for infection / sepsis.  Likely related to liver disease      C. difficile colitis  D iagnosed during his last admission CT A/P with circumferential wall thickening of rectum suggesting distal colitis/proctitis. procalcitonin low at 0.07. WBC 7.7. Afebrile.  Not taking vancomycin and currently having 2-3 loose stools a day on admission.  - Finished a course of oral vancomycin 125 mg every 6 hours    Chronic kidney disease stage III  Cr 1.63, Baseline creatinine 1.4-1.9.     Chronic anemia, nl MCV, stable  Hemoglobins initially in the 10s.  Have drifted down slightly to 8-9     BPH  - Continue flomax     JANIS,   - home CPAP ordered        Diet: 2 Gram Sodium Diet  Snacks/Supplements Adult: Other; Chocolate Shake + 2 pkts benepro w/ lunch and dinner (RD); With Meals    DVT Prophylaxis: Pneumatic Compression Devices  Leger Catheter: not present  Code Status: Full Code           Disposition Plan   Expected discharge: TBD, currently undergoing commitment process to determine discharge planning  Entered: Godfrey Vasques DO 10/29/2020, 12:22 PM       The patient's care was discussed with the Bedside Nurse, Care Coordinator/ and Patient.    Godfrey Vasques DO  Hospitalist Service  [facility]  Contact information available via [facility] Paging/Directory    ______________________________________________________________________    Interval History   Patient seen and examined.  No acute events over night.  No fevers or chills.  Patient has not had recurrence of diarrhea.  No pain currently.  No difficulty breathing.     Data reviewed today: I reviewed all medications, new labs and imaging results over the last  24 hours. I personally reviewed no images or EKG's today.    Physical Exam   Vital Signs: Temp: 97.9  F (36.6  C) Temp src: Oral BP: (!) 150/79 Pulse: (!) 46   Resp: 18 SpO2: 98 % O2 Device: None (Room air)    Weight: 175 lbs 14.83 oz  General Appearance: Resting comfortably. NAD   Respiratory: Clear to auscultation.  No respiratory distress  Cardiovascular: RRR. No murmurs  GI: Bowel sounds noted. Minimally distended  Skin: No rashes.  No cyanosis  Other: No edema.  No calf tenderness     Data   Recent Labs   Lab 10/29/20  1033 10/29/20  0938 10/28/20  0923 10/27/20  1006 10/25/20  0757 10/25/20  0757   WBC  --   --   --   --   --  3.3*   HGB  --   --   --   --   --  8.6*   MCV  --   --   --   --   --  90   PLT  --   --   --   --   --  190   INR 1.31*  --   --   --   --   --    NA  --  130*  130* 130* 130*   < > 132*   POTASSIUM  --  4.2  4.2 4.3 4.4   < > 4.8   CHLORIDE  --  101  101 102 101   < > 104   CO2  --  22  22 20 21   < > 20   BUN  --  33*  34* 28 27   < > 32*   CR  --  2.11*  2.07* 2.13* 2.10*   < > 2.33*   ANIONGAP  --  7  7 8 8   < > 8   KEV  --  8.1*  8.1* 8.6 8.5   < > 8.0*   GLC  --  124*  125* 144* 118*   < > 88   ALBUMIN  --  2.8*  --   --   --   --    PROTTOTAL  --  6.3*  --   --   --   --    BILITOTAL  --  0.3  --   --   --   --    ALKPHOS  --  173*  --   --   --   --    ALT  --  20  --   --   --   --    AST  --  22  --   --   --   --     < > = values in this interval not displayed.     No results found for this or any previous visit (from the past 24 hour(s)).

## 2020-10-29 NOTE — PLAN OF CARE
DATE & TIME: 10/28/2020 2567-7002  Cognitive Concerns/ Orientation : Alert/Oriented x 4, anxious at times (Atarax available 3x/day and bedtime, given x,1 & Seroquel x1). PRN Trazodone given x1 at bedtime.  BEHAVIOR & AGGRESSION TOOL COLOR: Green   ABNL VS/O2: Heart rate 50, otherwise VSS, RA  MOBILITY: Independent in room; walker when walking on unit  PAIN MANAGMENT: Slight LL chronic back pain. Lidocaine patch on left lower back/hip.  DIET: 2 gram sodium  BOWEL/BLADDER: Continent, up to bathroom  ABNL LAB/BG: Na 130; Creat 2.13  DRAIN/DEVICES: R PIV saline locked  SKIN: Skin tear/scab on R arm, healing; BUE bruising.   TESTS/PROCEDURES: n/a  D/C DAY/GOALS/PLACE: Pending commitment process and psychiatry.  OTHER IMPORTANT INFO: Enteric precautions maintained for C. Diff. GI consult ordered. SW/Psych following. PT/OT determined no further needs at this time. Pt understands that he is not allowed past the nursing station but can walk around it and down the Red and Blue wings. He states that he will comply. Nicotine patch on R arm.  COMMIT TO SIT DONE AND SIGNED OFF YES

## 2020-10-30 LAB
ANION GAP SERPL CALCULATED.3IONS-SCNC: 4 MMOL/L (ref 3–14)
BUN SERPL-MCNC: 33 MG/DL (ref 7–30)
CALCIUM SERPL-MCNC: 8.4 MG/DL (ref 8.5–10.1)
CHLORIDE SERPL-SCNC: 101 MMOL/L (ref 94–109)
CO2 SERPL-SCNC: 25 MMOL/L (ref 20–32)
CREAT SERPL-MCNC: 2.03 MG/DL (ref 0.66–1.25)
GFR SERPL CREATININE-BSD FRML MDRD: 33 ML/MIN/{1.73_M2}
GLUCOSE SERPL-MCNC: 91 MG/DL (ref 70–99)
POTASSIUM SERPL-SCNC: 4.2 MMOL/L (ref 3.4–5.3)
SODIUM SERPL-SCNC: 130 MMOL/L (ref 133–144)

## 2020-10-30 PROCEDURE — 120N000001 HC R&B MED SURG/OB

## 2020-10-30 PROCEDURE — 99232 SBSQ HOSP IP/OBS MODERATE 35: CPT | Performed by: INTERNAL MEDICINE

## 2020-10-30 PROCEDURE — 250N000013 HC RX MED GY IP 250 OP 250 PS 637: Performed by: STUDENT IN AN ORGANIZED HEALTH CARE EDUCATION/TRAINING PROGRAM

## 2020-10-30 PROCEDURE — 80048 BASIC METABOLIC PNL TOTAL CA: CPT | Performed by: INTERNAL MEDICINE

## 2020-10-30 PROCEDURE — 36415 COLL VENOUS BLD VENIPUNCTURE: CPT | Performed by: INTERNAL MEDICINE

## 2020-10-30 PROCEDURE — 250N000013 HC RX MED GY IP 250 OP 250 PS 637: Performed by: PSYCHIATRY & NEUROLOGY

## 2020-10-30 PROCEDURE — 99207 PR CDG-MDM COMPONENT: MEETS MODERATE - UP CODED: CPT | Performed by: INTERNAL MEDICINE

## 2020-10-30 PROCEDURE — 250N000013 HC RX MED GY IP 250 OP 250 PS 637: Performed by: PHYSICIAN ASSISTANT

## 2020-10-30 PROCEDURE — 250N000013 HC RX MED GY IP 250 OP 250 PS 637: Performed by: INTERNAL MEDICINE

## 2020-10-30 RX ADMIN — FOLIC ACID 1 MG: 1 TABLET ORAL at 09:13

## 2020-10-30 RX ADMIN — TAMSULOSIN HYDROCHLORIDE 0.4 MG: 0.4 CAPSULE ORAL at 09:13

## 2020-10-30 RX ADMIN — VORTIOXETINE 10 MG: 10 TABLET, FILM COATED ORAL at 09:14

## 2020-10-30 RX ADMIN — PANTOPRAZOLE SODIUM 40 MG: 40 TABLET, DELAYED RELEASE ORAL at 09:13

## 2020-10-30 RX ADMIN — HYDROXYZINE HYDROCHLORIDE 50 MG: 25 TABLET, FILM COATED ORAL at 10:52

## 2020-10-30 RX ADMIN — CARBIDOPA AND LEVODOPA 7.5 MG: 50; 200 TABLET, EXTENDED RELEASE ORAL at 19:08

## 2020-10-30 RX ADMIN — PANTOPRAZOLE SODIUM 40 MG: 40 TABLET, DELAYED RELEASE ORAL at 21:10

## 2020-10-30 RX ADMIN — HYDROXYZINE HYDROCHLORIDE 50 MG: 25 TABLET, FILM COATED ORAL at 18:58

## 2020-10-30 RX ADMIN — MULTIPLE VITAMINS W/ MINERALS TAB 1 TABLET: TAB at 09:13

## 2020-10-30 RX ADMIN — LIDOCAINE 1 PATCH: 560 PATCH PERCUTANEOUS; TOPICAL; TRANSDERMAL at 21:08

## 2020-10-30 RX ADMIN — CARBIDOPA AND LEVODOPA 7.5 MG: 50; 200 TABLET, EXTENDED RELEASE ORAL at 09:14

## 2020-10-30 RX ADMIN — CARBIDOPA AND LEVODOPA 7.5 MG: 50; 200 TABLET, EXTENDED RELEASE ORAL at 12:19

## 2020-10-30 RX ADMIN — MIRTAZAPINE 30 MG: 15 TABLET, FILM COATED ORAL at 21:10

## 2020-10-30 RX ADMIN — MAGNESIUM OXIDE 400 MG: 400 TABLET ORAL at 09:13

## 2020-10-30 RX ADMIN — QUETIAPINE FUMARATE 100 MG: 100 TABLET ORAL at 10:52

## 2020-10-30 RX ADMIN — QUETIAPINE FUMARATE 100 MG: 100 TABLET ORAL at 18:57

## 2020-10-30 RX ADMIN — NICOTINE 1 PATCH: 21 PATCH, EXTENDED RELEASE TRANSDERMAL at 09:18

## 2020-10-30 RX ADMIN — NADOLOL 40 MG: 40 TABLET ORAL at 09:15

## 2020-10-30 ASSESSMENT — ACTIVITIES OF DAILY LIVING (ADL)
ADLS_ACUITY_SCORE: 11

## 2020-10-30 NOTE — PROGRESS NOTES
DATE & TIME: 10/29/2020 Night    Cognitive Concerns/ Orientation : Alert/Oriented x 4   BEHAVIOR & AGGRESSION TOOL COLOR: Green   ABNL VS/O2: Bradycardia: HR 42, asymptomatic, otherwise VSS, room air  MOBILITY: Independent in room, walker while out on unit  PAIN MANAGMENT: Lidocaine patch to left, lower back removed by pt at 0540  DIET: 2 gram sodium  BOWEL/BLADDER: continent  ABNL LAB/BG: Na 130; Creat 2.11; GFR 32; INR 1.31  DRAIN/DEVICES: R PIV saline locked  SKIN: Scab to L wrist, bruising BUE  TESTS/PROCEDURES: n/a  D/C DAY/GOALS/PLACE: Pending commitment process  OTHER IMPORTANT INFO: Enteric precautions maintained for C. Diff. Oral vancomycin compete. Atarax available 3X/day + 1x at bedtime; Seroquel available 4x/day. Pt understands that he is not allowed past the nursing station but can walk around it and down Red/Blue wings. He states that he will comply.  Nicotine patch on L arm. PT/Ot determined no further needs at this time. SW/Psych following.  COMMIT TO SIT DONE AND SIGNED OFF YES

## 2020-10-30 NOTE — PLAN OF CARE
DATE & TIME: 10/29/2020 [id number]   Cognitive Concerns/ Orientation : Alert/Oriented x 4. Calm this shift. Atarax and Seroquel given x2, trazodone given x1.   BEHAVIOR & AGGRESSION TOOL COLOR: Green   ABNL VS/O2: VSS on RA except Bradycardic: HR 46-49, pt reports this is normal for him, asymptomatic.   MOBILITY: Independent in room, walker when walking on unit.  PAIN MANAGMENT: Denies. Lidocaine patch on L lower back (chronic pain)  DIET: 2 gram sodium  BOWEL/BLADDER: Up to bathroom  ABNL LAB/BG: Sodium 130, Crea 2.11, GFR 32,  albumin 2.8, INR 1.31.   DRAIN/DEVICES: R PIV saline locked  SKIN: Scab to L wrist; bruising on BUE  TESTS/PROCEDURES: n/a  D/C DAY/GOALS/PLACE: Pending commitment process and psychiatry  OTHER IMPORTANT INFO: Enteric precautions maintained. Oral vancomycin complete. Atarax available 3x/day + bedtime; Seroquel available 4X/day. Pt understands that he is not allowed past the nursing station but can walk around it and down Red/Blue wings. He states that he will comply. Nicotine patch on L arm. PT/OT determined no further needs at this time. SW/Psych follwing.   COMMIT TO SIT DONE AND SIGNED OFF YES

## 2020-10-30 NOTE — PLAN OF CARE
DATE & TIME: 10/30/20 [id number]    Cognitive Concerns/ Orientation : Alert/Oriented x 4   BEHAVIOR & AGGRESSION TOOL COLOR: Green   ABNL VS/O2: Bradycardia: HR 41, asymptomatic, MD aware. Otherwise VSS, room air.  MOBILITY: Independent in room, walker while out on unit  PAIN MANAGMENT: Nicotine patch to R arm  DIET: 2 gram sodium  BOWEL/BLADDER: Continent  ABNL LAB/BG: Na 130  DRAIN/DEVICES: R PIV saline locked  SKIN: Scab to L wrist, bruising BUE  TESTS/PROCEDURES: n/a  D/C DAY/GOALS/PLACE: Pending commitment process.  OTHER IMPORTANT INFO: Enteric precautions maintained for C. Diff. Oral vancomycin compete. Atarax available 3X/day + 1x at bedtime; Seroquel available 4x/day. Pt understands that he is not allowed past the nursing station but can walk around it and down Red/Blue wings. He states that he will comply. GI/SW/Psych following.  COMMIT TO SIT DONE AND SIGNED OFF YES

## 2020-10-30 NOTE — PROGRESS NOTES
[facility]    Medicine Progress Note - Hospitalist Service       Date of Admission:  10/14/2020  Assessment & Plan       Jim Richard is a 65 year old male was admitted on 10/14/2020 with PMH use disorder, cirrhosis, depression, recent hospitalization for intentional overdose, multiple hospitalization for intoxication who is being admitted again on 10/14/2020, due to continued drinking and suicidal ideation, stating that he would take all of his medications at once.  Recently hospitalized from 10/6 - 10/11 for alcohol intoxication with metabolic acidosis and C. difficile.  After discharge he states he stopped taking all of his medications and has been drinking because he does not care whether he lives or dies.     Depression, anxiety, suicidal ideation   Acute alcohol intoxication  Severe alcohol use disorder - multiple recent admission for intoxication at risk of life-threateningillness/death  Patient has been through treatment multiple times in the past. He states he stating drinking again right after discharge. BAL 0.36 at admission.    - CIWA stopped  - Psychiatry was consulted-> has initiated a commitment process due to his recidivism, inability to maintain sobriety, multiple admissions secondary to alcohol-related issues and expressing suicidal ideation while intoxicated.    - Continue PTA mirtazapine, trintellix  - Court appearance is currently scheduled for next week      Alcoholic cirrhosis with history of esophageal varices and ascites   History of GI bleed.  Grade 1 esophageal varices with portal gastropathy. He c/o diffuse abdominal discomfort and has obvious fluid wave on exam with diffuse mild tenderness on exam. Last paracentesis on 10/9 was negative for SBP and patient currently refusing paracentesis stating pain no different now than prior to that procedure. S/p paracentesis 10/19.  - Continue PPI   - PTA Atenolol stopped  - Nadolol started today  - GI following and  appreciate their recommendations.  Stopped diuretics     Bradycardia  Suspect due to initiation of Nadolol while also receiving Atenolol   - Hold parameters for Nadolol in place  - Atenolol has been discontinued   - Continue to monitor     Mild hyponatremia  Sodiums drifted down to 130 and has remained there the past 3 days  - Continue to monitor      Lactic acidosis secondary to above  Hemodynamically stable with low suspicion for infection / sepsis.  Likely related to liver disease      C. difficile colitis  D iagnosed during his last admission CT A/P with circumferential wall thickening of rectum suggesting distal colitis/proctitis. procalcitonin low at 0.07. WBC 7.7. Afebrile.  Not taking vancomycin and currently having 2-3 loose stools a day on admission.  - Finished a course of oral vancomycin 125 mg every 6 hours    NGUYEN on CKD stage III  Cr 1.63 on admission , Baseline creatinine 1.4-1.9.  Cr had bumped up to 2.1 over the past week, likely due to diuretics  - Continue to monitor  - Diuretics stopped      Chronic anemia, nl MCV, stable  Hemoglobins initially in the 10s.  Have drifted down slightly to 8-9     BPH  - Continue flomax     JANIS,   - home CPAP ordered          Diet: 2 Gram Sodium Diet  Snacks/Supplements Adult: Other; Chocolate Shake + 2 pkts benepro w/ lunch and dinner (RD); With Meals    DVT Prophylaxis: Pneumatic Compression Devices  Leger Catheter: not present  Code Status: Full Code           Disposition Plan   Expected discharge: TBD, undergoing court evaluation for commitment  Entered: Godfrey Vasques DO 10/30/2020, 1:42 PM       The patient's care was discussed with the Bedside Nurse, Care Coordinator/ and Patient.    Godfrey Vasques DO  Hospitalist Service  [facility]  Contact information available via [facility] Paging/Directory    ______________________________________________________________________    Interval History   Patient seen and examined.   No acute events over night.  No fevers or chills.  No pain at this time.  No difficulty breathing.  No further episodes of diarrhea.     Data reviewed today: I reviewed all medications, new labs and imaging results over the last 24 hours. I personally reviewed no images or EKG's today.    Physical Exam   Vital Signs: Temp: 98.1  F (36.7  C) Temp src: Oral BP: 135/72 Pulse: (!) 41   Resp: 20 SpO2: 98 % O2 Device: None (Room air)    Weight: 179 lbs 7.27 oz  General Appearance: Resting comfortably.  NAD   Respiratory: Clear to auscultation.  No respiratory distress  Cardiovascular: RRR.  No obvious murmurs  GI: Bowel sounds present.  Non-tender  Skin: No rashes.  No cyanosis  Other: No edema.  No calf tenderness      Data   Recent Labs   Lab 10/30/20  1151 10/29/20  1033 10/29/20  0938 10/28/20  0923 10/25/20  0757 10/25/20  0757   WBC  --   --   --   --   --  3.3*   HGB  --   --   --   --   --  8.6*   MCV  --   --   --   --   --  90   PLT  --   --   --   --   --  190   INR  --  1.31*  --   --   --   --    *  --  130*  130* 130*   < > 132*   POTASSIUM 4.2  --  4.2  4.2 4.3   < > 4.8   CHLORIDE 101  --  101  101 102   < > 104   CO2 25  --  22  22 20   < > 20   BUN 33*  --  33*  34* 28   < > 32*   CR 2.03*  --  2.11*  2.07* 2.13*   < > 2.33*   ANIONGAP 4  --  7  7 8   < > 8   KEV 8.4*  --  8.1*  8.1* 8.6   < > 8.0*   GLC 91  --  124*  125* 144*   < > 88   ALBUMIN  --   --  2.8*  --   --   --    PROTTOTAL  --   --  6.3*  --   --   --    BILITOTAL  --   --  0.3  --   --   --    ALKPHOS  --   --  173*  --   --   --    ALT  --   --  20  --   --   --    AST  --   --  22  --   --   --     < > = values in this interval not displayed.     No results found for this or any previous visit (from the past 24 hour(s)).

## 2020-10-30 NOTE — PROGRESS NOTES
[facility]  Gastroenterology Progress Note     Jim Richard MRN# [id number]   YOB: 1955 Age: 65 year old          Assessment and Plan:   Jim Richard is a pleasant 65 year old male with alcoholic liver cirrhosis complicated with esophageal varices, abdominal ascites, hepatorenal syndrome, pancytopenia. He is also being treated for c difficile colitis. GI consulted for help with management of alcoholic liver cirrhosis.     Active Problems:    Alcoholic liver cirrhosis    Elevated Creatinine- hepatorenal syndrome    Abdominal ascites   Hypoalbuminemia    Pancytopenia  Patient has suffered from chronic alcoholism and developed significant complications with decompensated liver cirrhosis. He has required paracentesis every 1-4 weeks. Has had elevating creatinine. Over last 6 months has had Creatinine increase from 1.4 to 2.3. He has type 2 hepatorenal syndrome. Has pancytopenia due to bone marrow suppression.  --Due to worsening kidney function would recommend to discontinue all diuretics. He is unable to tolerate even low doses.   --He may require more frequent paracentesis.   -- Albumin improved to 2.8- will check daily and if decreases can supplement.  -- Continue midodrine 7.5 mg TID  -- Continue nadolol  40 mg daily  -- Plan paracentesis as needed  -- Consider TIPS procedure in near future  -- Monitor CBC, CMP daily  -- Regular diet and encourage good nutrition  -- Appreciate psychiatry's help       C. difficile colitis  Asymptomatic. 3 stools daily.   Has been on vancomycin 125 mg QID            Alcoholic intoxication without complication (H)  C. difficile colitis  Lactic acidosis      Interval History:   no new complaints, doing well, denies chest pain, denies shortness of breath, denies abdominal pain, alert, oriented to person, place and time and doing well; no cp, sob, n/v/d, or abd pain.              Review of Systems:   C: NEGATIVE for fever, chills, change  in weight  E/M: NEGATIVE for ear, mouth and throat problems  R: NEGATIVE for significant cough or SOB  CV: NEGATIVE for chest pain, palpitations or peripheral edema             Medications:   I have reviewed this patient's current medications    atenolol  25 mg Oral Daily     folic acid  1 mg Oral Daily     lidocaine  1 patch Transdermal Q24h    And     lidocaine   Transdermal Q8H     magnesium oxide  400 mg Oral Daily     midodrine  7.5 mg Oral TID w/meals     mirtazapine  30 mg Oral At Bedtime     multivitamin w/minerals  1 tablet Oral Daily     nadolol  40 mg Oral Daily     nicotine  1 patch Transdermal Daily     nicotine   Transdermal Q8H     pantoprazole  40 mg Oral BID     sodium chloride (PF)  3 mL Intracatheter Q8H     tamsulosin  0.4 mg Oral Daily     vortioxetine  10 mg Oral Daily                  Physical Exam:   Vitals were reviewed  Vital Signs with Ranges  Temp:  [97.7  F (36.5  C)-98.6  F (37  C)] 98.1  F (36.7  C)  Pulse:  [41-49] 41  Resp:  [20] 20  BP: (122-144)/(61-72) 135/72  SpO2:  [96 %-98 %] 98 %  I/O last 3 completed shifts:  In: 480 [P.O.:480]  Out: -   Constitutional: healthy, alert and no distress   Cardiovascular: negative, PMI normal. No lifts, heaves, or thrills. RRR. No murmurs, clicks gallops or rub  Respiratory: negative, Percussion normal. Good diaphragmatic excursion. Lungs clear  Head: Normocephalic. No masses, lesions, tenderness or abnormalities  Neck: Neck supple. No adenopathy. Thyroid symmetric, normal size,, Carotids without bruits.  Abdomen: Abdomen soft, non-tender. BS normal. No masses, organomegaly           Data:   I reviewed the patient's new clinical lab test results.   Recent Labs   Lab Test 10/29/20  1033 10/25/20  0757 10/21/20  0912 10/19/20  1007 10/05/20  1901 10/05/20  1901   WBC  --  3.3* 5.1 6.0   < > 7.2   HGB  --  8.6* 9.1* 8.6*   < > 10.3*   MCV  --  90 92 92   < > 91   PLT  --  190 179 153   < > 202   INR 1.31*  --   --  1.37*  --  1.22*    < > = values  in this interval not displayed.     Recent Labs   Lab Test 10/29/20  0938 10/28/20  0923 10/27/20  1006   POTASSIUM 4.2  4.2 4.3 4.4   CHLORIDE 101  101 102 101   CO2 22  22 20 21   BUN 33*  34* 28 27   ANIONGAP 7  7 8 8     Recent Labs   Lab Test 10/29/20  0938 10/19/20  1007 10/18/20  1537 10/15/20  0952 10/14/20  1530 10/08/20  2340 10/08/20  2340 10/06/20  1610 10/06/20  1610 10/05/20  1901 10/05/20  1901 05/26/20  1612 05/26/20  1612 01/11/20  1730 01/11/20  1730   ALBUMIN 2.8* 2.7* 2.5* 2.9* 3.0*   < >  --    < >  --    < > 3.3*   < >  --    < >  --    BILITOTAL 0.3 0.5  --  0.8 0.6  --   --   --   --    < > 0.9   < >  --    < >  --    ALT 20 19  --  18 20  --   --   --   --    < > 22   < >  --    < >  --    AST 22 28  --  33 36  --   --   --   --    < > 39   < >  --    < >  --    PROTEIN  --   --   --   --   --   --   --   --  10*  --   --   --  30*  --  Negative   LIPASE  --   --   --   --  236  --  298  --   --   --  348  --   --    < >  --     < > = values in this interval not displayed.       I reviewed the patient's new imaging results.    All laboratory data reviewed  All imaging studies reviewed by me.    Gloria De Leon PA-C,  10/30/2020  [facility]  Office : [phone]  Cell: [phone] (Dr. Shaw)  Cell: [phone] (Gloria De Leon PA-C)

## 2020-10-30 NOTE — PROVIDER NOTIFICATION
MD Notification    Notified Person: MD    Notified Person Name: Layo    Notification Date/Time: 10/30/2020 1239    Notification Interaction: Paged    Purpose of Notification: FYI Patient had been remaining bradycardic, just now HR at 41 and asymptomatic.      Orders Received: Continue to monitor.    Comments:'

## 2020-10-30 NOTE — PROGRESS NOTES
Care Management Follow Up Note    Length of Stay (days) 16    Patient plan of care discussed at Interdisciplinary Rounds: yes  Expected Discharge Date: (Pending court hearing)  Concerns to be Addressed:     discharge planning, commitment     Anticipated Discharge Disposition:  TBD  Anticipated Discharge Services:  TBD  Anticipated Discharge DME:  TBD    Plan:  Referral sent to [facility] in [place]. Spoke with Karey, [phone], f:[phone]. Faxed info and explained pt's commitment status. Pt will need an updated Rule 25.     JULIA John, LGSW  [phone]  [facility]

## 2020-10-31 LAB
ANION GAP SERPL CALCULATED.3IONS-SCNC: 7 MMOL/L (ref 3–14)
BUN SERPL-MCNC: 34 MG/DL (ref 7–30)
CALCIUM SERPL-MCNC: 8.6 MG/DL (ref 8.5–10.1)
CHLORIDE SERPL-SCNC: 104 MMOL/L (ref 94–109)
CO2 SERPL-SCNC: 22 MMOL/L (ref 20–32)
CREAT SERPL-MCNC: 2.05 MG/DL (ref 0.66–1.25)
GFR SERPL CREATININE-BSD FRML MDRD: 33 ML/MIN/{1.73_M2}
GLUCOSE SERPL-MCNC: 92 MG/DL (ref 70–99)
MAGNESIUM SERPL-MCNC: 1.9 MG/DL (ref 1.6–2.3)
POTASSIUM SERPL-SCNC: 4.4 MMOL/L (ref 3.4–5.3)
SODIUM SERPL-SCNC: 133 MMOL/L (ref 133–144)

## 2020-10-31 PROCEDURE — 250N000013 HC RX MED GY IP 250 OP 250 PS 637: Performed by: INTERNAL MEDICINE

## 2020-10-31 PROCEDURE — 83735 ASSAY OF MAGNESIUM: CPT | Performed by: INTERNAL MEDICINE

## 2020-10-31 PROCEDURE — 80048 BASIC METABOLIC PNL TOTAL CA: CPT | Performed by: INTERNAL MEDICINE

## 2020-10-31 PROCEDURE — 250N000013 HC RX MED GY IP 250 OP 250 PS 637: Performed by: PHYSICIAN ASSISTANT

## 2020-10-31 PROCEDURE — 99232 SBSQ HOSP IP/OBS MODERATE 35: CPT | Performed by: HOSPITALIST

## 2020-10-31 PROCEDURE — 36415 COLL VENOUS BLD VENIPUNCTURE: CPT | Performed by: INTERNAL MEDICINE

## 2020-10-31 PROCEDURE — 120N000001 HC R&B MED SURG/OB

## 2020-10-31 PROCEDURE — 250N000013 HC RX MED GY IP 250 OP 250 PS 637: Performed by: PSYCHIATRY & NEUROLOGY

## 2020-10-31 PROCEDURE — 250N000013 HC RX MED GY IP 250 OP 250 PS 637: Performed by: STUDENT IN AN ORGANIZED HEALTH CARE EDUCATION/TRAINING PROGRAM

## 2020-10-31 RX ADMIN — HYDROXYZINE HYDROCHLORIDE 50 MG: 25 TABLET, FILM COATED ORAL at 01:13

## 2020-10-31 RX ADMIN — LIDOCAINE 1 PATCH: 560 PATCH PERCUTANEOUS; TOPICAL; TRANSDERMAL at 20:24

## 2020-10-31 RX ADMIN — PANTOPRAZOLE SODIUM 40 MG: 40 TABLET, DELAYED RELEASE ORAL at 08:06

## 2020-10-31 RX ADMIN — QUETIAPINE FUMARATE 100 MG: 100 TABLET ORAL at 09:41

## 2020-10-31 RX ADMIN — NICOTINE 1 PATCH: 21 PATCH, EXTENDED RELEASE TRANSDERMAL at 08:08

## 2020-10-31 RX ADMIN — MAGNESIUM OXIDE 400 MG: 400 TABLET ORAL at 08:06

## 2020-10-31 RX ADMIN — FOLIC ACID 1 MG: 1 TABLET ORAL at 08:06

## 2020-10-31 RX ADMIN — CARBIDOPA AND LEVODOPA 7.5 MG: 50; 200 TABLET, EXTENDED RELEASE ORAL at 08:06

## 2020-10-31 RX ADMIN — VORTIOXETINE 10 MG: 10 TABLET, FILM COATED ORAL at 08:06

## 2020-10-31 RX ADMIN — FLUTICASONE FUROATE AND VILANTEROL TRIFENATATE 1 PUFF: 200; 25 POWDER RESPIRATORY (INHALATION) at 08:05

## 2020-10-31 RX ADMIN — HYDROXYZINE HYDROCHLORIDE 50 MG: 25 TABLET, FILM COATED ORAL at 16:19

## 2020-10-31 RX ADMIN — CARBIDOPA AND LEVODOPA 7.5 MG: 50; 200 TABLET, EXTENDED RELEASE ORAL at 14:30

## 2020-10-31 RX ADMIN — MULTIPLE VITAMINS W/ MINERALS TAB 1 TABLET: TAB at 08:06

## 2020-10-31 RX ADMIN — TAMSULOSIN HYDROCHLORIDE 0.4 MG: 0.4 CAPSULE ORAL at 08:06

## 2020-10-31 RX ADMIN — QUETIAPINE FUMARATE 100 MG: 100 TABLET ORAL at 16:19

## 2020-10-31 RX ADMIN — PANTOPRAZOLE SODIUM 40 MG: 40 TABLET, DELAYED RELEASE ORAL at 20:24

## 2020-10-31 RX ADMIN — CARBIDOPA AND LEVODOPA 7.5 MG: 50; 200 TABLET, EXTENDED RELEASE ORAL at 18:00

## 2020-10-31 RX ADMIN — HYDROXYZINE HYDROCHLORIDE 50 MG: 25 TABLET, FILM COATED ORAL at 11:15

## 2020-10-31 ASSESSMENT — ACTIVITIES OF DAILY LIVING (ADL)
ADLS_ACUITY_SCORE: 11

## 2020-10-31 NOTE — PROGRESS NOTES
[facility]    Medicine Progress Note - Hospitalist Service       Date of Admission:  10/14/2020  Assessment & Plan     Jim Richard is a 65 year old male was admitted on 10/14/2020 with PMH use disorder, cirrhosis, depression, recent hospitalization for intentional overdose, multiple hospitalization for intoxication who is being admitted again on 10/14/2020, due to continued drinking and suicidal ideation, stating that he would take all of his medications at once.  Recently hospitalized from 10/6 - 10/11 for alcohol intoxication with metabolic acidosis and C. difficile.  After discharge he states he stopped taking all of his medications and has been drinking because he does not care whether he lives or dies.     Depression, anxiety, suicidal ideation   Acute alcohol intoxication  Severe alcohol use disorder - multiple recent admission for intoxication at risk of life-threateningillness/death  Patient has been through treatment multiple times in the past. He states he stating drinking again right after discharge. BAL 0.36 at admission.    - CIWA stopped  - Psychiatry was consulted-> has initiated a commitment process due to his recidivism, inability to maintain sobriety, multiple admissions secondary to alcohol-related issues and expressing suicidal ideation while intoxicated.    - Continue PTA mirtazapine, trintellix  - Court appearance is currently scheduled for next week      Alcoholic cirrhosis with history of esophageal varices and ascites   History of GI bleed.  Grade 1 esophageal varices with portal gastropathy. He c/o diffuse abdominal discomfort and has obvious fluid wave on exam with diffuse mild tenderness on exam. Last paracentesis on 10/9 was negative for SBP and patient currently refusing paracentesis stating pain no different now than prior to that procedure. S/p paracentesis 10/19.  - Continue PPI   - PTA Atenolol stopped  - Nadolol started 10/29/2020.  Hold parameters  for heart rate less than 55.  - GI following and appreciate their recommendations.  Stopped diuretics     Bradycardia  Suspect due to initiation of Nadolol while also receiving Atenolol   - Hold parameters for Nadolol in place for heart rate less than 55  - Atenolol has been discontinued   - Continue to monitor     Mild hyponatremia  Sodium stable in the low 130s  - Continue to monitor      Lactic acidosis secondary to above  Hemodynamically stable with low suspicion for infection / sepsis.  Likely related to liver disease      C. difficile colitis  D iagnosed during his last admission CT A/P with circumferential wall thickening of rectum suggesting distal colitis/proctitis. procalcitonin low at 0.07. WBC 7.7. Afebrile.  Not taking vancomycin and currently having 2-3 loose stools a day on admission.  - Finished a course of oral vancomycin 125 mg every 6 hours    NGUYEN on CKD stage III  Cr 1.63 on admission , Baseline creatinine 1.4-1.9.  Cr had bumped up to 2.1 over the past week, likely due to diuretics  - Continue to monitor  - Diuretics stopped      Chronic anemia, nl MCV, stable  Hemoglobins initially in the 10s.  Have drifted down slightly to 8-9     BPH  - Continue flomax     JANIS,   - home CPAP ordered     Malnutrition:    - Level of malnutrition: Non-Severe   - Based on: mild (or greater) subcutaneous fat loss, mild (or greater) muscle loss         Diet: 2 Gram Sodium Diet  Snacks/Supplements Adult: Other; Chocolate Shake + 2 pkts benepro w/ lunch and dinner (RD); With Meals    DVT Prophylaxis: Pneumatic Compression Devices  Leger Catheter: not present  Code Status: Full Code           Disposition Plan   Expected discharge: TBD, undergoing court evaluation for commitment  Entered: Hanny Knowles MD 10/31/2020, 1:34 PM       The patient's care was discussed with the Bedside Nurse, Care Coordinator/ and Patient.    Hanny Knowles MD  Hospitalist Service  [facility]  Hospital  Contact information available via [facility] Paging/Directory    ______________________________________________________________________    Interval History   Patient seen and examined.  No acute events over night.  No fevers or chills.  No pain at this time.  No difficulty breathing.  No further episodes of diarrhea.  Patient is seen walking the hallways with no difficulty.  Complains of some anxiety for which she is getting as needed Atarax and Seroquel.  Abdomen feels soft and only mildly distended.    Data reviewed today: I reviewed all medications, new labs and imaging results over the last 24 hours. I personally reviewed no images or EKG's today.    Physical Exam   Vital Signs: Temp: 97.8  F (36.6  C) Temp src: Oral BP: 127/62 Pulse: (!) 42   Resp: 20 SpO2: 97 % O2 Device: None (Room air)    Weight: 182 lbs 8.65 oz  General Appearance: Ambulating without any difficulty.  NAD   Respiratory: Clear to auscultation.  No respiratory distress  Cardiovascular: RRR.  No obvious murmurs  GI: Bowel sounds present.  Non-tender.  Abdomen is mildly distended but soft.  Skin: No rashes.  No cyanosis  Other: No edema.  No calf tenderness    Psych: Calm and pleasant, oriented x3    Data   Recent Labs   Lab 10/31/20  1009 10/30/20  1151 10/29/20  1033 10/29/20  0938 10/25/20  0757 10/25/20  0757   WBC  --   --   --   --   --  3.3*   HGB  --   --   --   --   --  8.6*   MCV  --   --   --   --   --  90   PLT  --   --   --   --   --  190   INR  --   --  1.31*  --   --   --     130*  --  130*  130*   < > 132*   POTASSIUM 4.4 4.2  --  4.2  4.2   < > 4.8   CHLORIDE 104 101  --  101  101   < > 104   CO2 22 25  --  22  22   < > 20   BUN 34* 33*  --  33*  34*   < > 32*   CR 2.05* 2.03*  --  2.11*  2.07*   < > 2.33*   ANIONGAP 7 4  --  7  7   < > 8   KEV 8.6 8.4*  --  8.1*  8.1*   < > 8.0*   GLC 92 91  --  124*  125*   < > 88   ALBUMIN  --   --   --  2.8*  --   --    PROTTOTAL  --   --   --  6.3*  --   --    BILITOTAL   --   --   --  0.3  --   --    ALKPHOS  --   --   --  173*  --   --    ALT  --   --   --  20  --   --    AST  --   --   --  22  --   --     < > = values in this interval not displayed.     No results found for this or any previous visit (from the past 24 hour(s)).

## 2020-10-31 NOTE — PLAN OF CARE
DATE & TIME: 10/31/20 4044-7098  Cognitive Concerns/ Orientation : A&Ox4   BEHAVIOR & AGGRESSION TOOL COLOR: Green   ABNL VS/O2: Bradycardia: HR 40's, asymptomatic, MD aware. Otherwise VSS, room air.  MOBILITY: Independent in room, walker while out on unit  PAIN MANAGMENT: Nicotine patch to L arm  DIET: 2g Na; eating 100%  BOWEL/BLADDER: Continent  ABNL LAB/BG: Na   DRAIN/DEVICES: R PIV SL  SKIN: Scab to L wrist, bruising BUE  TESTS/PROCEDURES: n/a  D/C DAY/GOALS/PLACE: Pending commitment process.  OTHER IMPORTANT INFO: Enteric precautions maintained for C. Diff. Oral vancomycin compete.  Pt acknowledged that he is not allowed past the nursing station but can walk around it and down Red/Blue wings. Seroquel and Atarax given for anxiety with some improvement.  GI/SW/Psych following.

## 2020-10-31 NOTE — PLAN OF CARE
DATE & TIME: 10/31/20 7591-0611  Cognitive Concerns/ Orientation : A&Ox4   BEHAVIOR & AGGRESSION TOOL COLOR: Green   ABNL VS/O2: Bradycardia: HR 47, asymptomatic, MD aware. Otherwise VSS, room air.  MOBILITY: Independent in room, walker while out on unit  PAIN MANAGMENT: Nicotine patch to R arm  DIET: 2g Na  BOWEL/BLADDER: Continent  ABNL LAB/BG: Na 130  DRAIN/DEVICES: R PIV SL  SKIN: Scab to L wrist, bruising BUE  TESTS/PROCEDURES: n/a  D/C DAY/GOALS/PLACE: Pending commitment process.  OTHER IMPORTANT INFO: Enteric precautions maintained for C. Diff. Oral vancomycin compete. Atarax available 3X/day + 1x at bedtime, given x1; Seroquel available 4x/day. Pt understands that he is not allowed past the nursing station but can walk around it and down Red/Blue wings. He states that he will comply. GI/SW/Psych following.  COMMIT TO SIT DONE AND SIGNED OFF YES

## 2020-11-01 LAB
ANION GAP SERPL CALCULATED.3IONS-SCNC: 8 MMOL/L (ref 3–14)
BUN SERPL-MCNC: 36 MG/DL (ref 7–30)
CALCIUM SERPL-MCNC: 8.5 MG/DL (ref 8.5–10.1)
CHLORIDE SERPL-SCNC: 104 MMOL/L (ref 94–109)
CO2 SERPL-SCNC: 21 MMOL/L (ref 20–32)
CREAT SERPL-MCNC: 1.95 MG/DL (ref 0.66–1.25)
GFR SERPL CREATININE-BSD FRML MDRD: 35 ML/MIN/{1.73_M2}
GLUCOSE SERPL-MCNC: 99 MG/DL (ref 70–99)
POTASSIUM SERPL-SCNC: 4.3 MMOL/L (ref 3.4–5.3)
SODIUM SERPL-SCNC: 133 MMOL/L (ref 133–144)

## 2020-11-01 PROCEDURE — 250N000013 HC RX MED GY IP 250 OP 250 PS 637: Performed by: STUDENT IN AN ORGANIZED HEALTH CARE EDUCATION/TRAINING PROGRAM

## 2020-11-01 PROCEDURE — 99232 SBSQ HOSP IP/OBS MODERATE 35: CPT | Performed by: STUDENT IN AN ORGANIZED HEALTH CARE EDUCATION/TRAINING PROGRAM

## 2020-11-01 PROCEDURE — 120N000001 HC R&B MED SURG/OB

## 2020-11-01 PROCEDURE — 250N000013 HC RX MED GY IP 250 OP 250 PS 637: Performed by: INTERNAL MEDICINE

## 2020-11-01 PROCEDURE — 250N000013 HC RX MED GY IP 250 OP 250 PS 637: Performed by: PSYCHIATRY & NEUROLOGY

## 2020-11-01 PROCEDURE — 99207 PR CDG-CUT & PASTE-POTENTIAL IMPACT ON LEVEL: CPT | Performed by: STUDENT IN AN ORGANIZED HEALTH CARE EDUCATION/TRAINING PROGRAM

## 2020-11-01 PROCEDURE — 250N000013 HC RX MED GY IP 250 OP 250 PS 637: Performed by: PHYSICIAN ASSISTANT

## 2020-11-01 PROCEDURE — 250N000013 HC RX MED GY IP 250 OP 250 PS 637: Performed by: HOSPITALIST

## 2020-11-01 PROCEDURE — 36415 COLL VENOUS BLD VENIPUNCTURE: CPT | Performed by: INTERNAL MEDICINE

## 2020-11-01 PROCEDURE — 80048 BASIC METABOLIC PNL TOTAL CA: CPT | Performed by: INTERNAL MEDICINE

## 2020-11-01 RX ADMIN — LIDOCAINE 1 PATCH: 560 PATCH PERCUTANEOUS; TOPICAL; TRANSDERMAL at 19:53

## 2020-11-01 RX ADMIN — VORTIOXETINE 10 MG: 10 TABLET, FILM COATED ORAL at 08:07

## 2020-11-01 RX ADMIN — HYDROXYZINE HYDROCHLORIDE 50 MG: 25 TABLET, FILM COATED ORAL at 16:01

## 2020-11-01 RX ADMIN — NICOTINE 1 PATCH: 21 PATCH, EXTENDED RELEASE TRANSDERMAL at 08:07

## 2020-11-01 RX ADMIN — HYDROXYZINE HYDROCHLORIDE 50 MG: 25 TABLET, FILM COATED ORAL at 00:39

## 2020-11-01 RX ADMIN — TRAZODONE HYDROCHLORIDE 100 MG: 100 TABLET ORAL at 21:37

## 2020-11-01 RX ADMIN — HYDROXYZINE HYDROCHLORIDE 50 MG: 25 TABLET, FILM COATED ORAL at 23:55

## 2020-11-01 RX ADMIN — PANTOPRAZOLE SODIUM 40 MG: 40 TABLET, DELAYED RELEASE ORAL at 08:07

## 2020-11-01 RX ADMIN — CARBIDOPA AND LEVODOPA 7.5 MG: 50; 200 TABLET, EXTENDED RELEASE ORAL at 08:07

## 2020-11-01 RX ADMIN — CARBIDOPA AND LEVODOPA 7.5 MG: 50; 200 TABLET, EXTENDED RELEASE ORAL at 12:45

## 2020-11-01 RX ADMIN — MAGNESIUM OXIDE 400 MG: 400 TABLET ORAL at 08:07

## 2020-11-01 RX ADMIN — QUETIAPINE FUMARATE 100 MG: 100 TABLET ORAL at 00:40

## 2020-11-01 RX ADMIN — MULTIPLE VITAMINS W/ MINERALS TAB 1 TABLET: TAB at 08:07

## 2020-11-01 RX ADMIN — MIRTAZAPINE 30 MG: 15 TABLET, FILM COATED ORAL at 21:38

## 2020-11-01 RX ADMIN — TAMSULOSIN HYDROCHLORIDE 0.4 MG: 0.4 CAPSULE ORAL at 08:07

## 2020-11-01 RX ADMIN — QUETIAPINE FUMARATE 100 MG: 100 TABLET ORAL at 08:07

## 2020-11-01 RX ADMIN — QUETIAPINE FUMARATE 100 MG: 100 TABLET ORAL at 21:38

## 2020-11-01 RX ADMIN — PANTOPRAZOLE SODIUM 40 MG: 40 TABLET, DELAYED RELEASE ORAL at 21:38

## 2020-11-01 RX ADMIN — QUETIAPINE FUMARATE 100 MG: 100 TABLET ORAL at 16:01

## 2020-11-01 RX ADMIN — HYDROXYZINE HYDROCHLORIDE 50 MG: 25 TABLET, FILM COATED ORAL at 08:07

## 2020-11-01 RX ADMIN — NADOLOL 40 MG: 40 TABLET ORAL at 08:07

## 2020-11-01 RX ADMIN — CARBIDOPA AND LEVODOPA 7.5 MG: 50; 200 TABLET, EXTENDED RELEASE ORAL at 17:16

## 2020-11-01 RX ADMIN — MIRTAZAPINE 30 MG: 15 TABLET, FILM COATED ORAL at 00:39

## 2020-11-01 RX ADMIN — FOLIC ACID 1 MG: 1 TABLET ORAL at 08:07

## 2020-11-01 ASSESSMENT — ACTIVITIES OF DAILY LIVING (ADL)
ADLS_ACUITY_SCORE: 11

## 2020-11-01 NOTE — PROGRESS NOTES
[facility]    Medicine Progress Note - Hospitalist Service       Date of Admission:  10/14/2020  Assessment & Plan     Jim Richard is a 65 year old male was admitted on 10/14/2020 with PMH use disorder, cirrhosis, depression, recent hospitalization for intentional overdose, multiple hospitalization for intoxication who is being admitted again on 10/14/2020, due to continued drinking and suicidal ideation, stating that he would take all of his medications at once.  Recently hospitalized from 10/6 - 10/11 for alcohol intoxication with metabolic acidosis and C. difficile.  After discharge he states he stopped taking all of his medications and has been drinking because he does not care whether he lives or dies. Currently on a hold pending court hearing for commitment.      Depression, anxiety, suicidal ideation   Acute alcohol intoxication  Severe alcohol use disorder - multiple recent admission for intoxication at risk of life-threateningillness/death  Patient has been through treatment multiple times in the past. He states he stating drinking again right after discharge. BAL 0.36 at admission.    - CIWA stopped  - Psychiatry was consulted-> has initiated a commitment process due to his recidivism, inability to maintain sobriety, multiple admissions secondary to alcohol-related issues and expressing suicidal ideation while intoxicated.    - Continue PTA mirtazapine, trintellix  - Court appearance is currently scheduled for next week      Alcoholic cirrhosis with history of esophageal varices and ascites   History of GI bleed.  Grade 1 esophageal varices with portal gastropathy. He c/o diffuse abdominal discomfort and has obvious fluid wave on exam with diffuse mild tenderness on exam. Last paracentesis on 10/9 was negative for SBP and patient currently refusing paracentesis stating pain no different now than prior to that procedure. S/p paracentesis 10/19.  - Continue PPI   - PTA  Atenolol stopped  - Nadolol started 10/29/2020.  Hold parameters for heart rate less than 55.  - GI following and appreciate their recommendations.  Stopped diuretics     Bradycardia  Suspect due to initiation of Nadolol while also receiving Atenolol   - Hold parameters for Nadolol in place for heart rate less than 55  - Atenolol has been discontinued   - Continue to monitor     Mild hyponatremia  Sodium stable in the low 130s  - Continue to monitor      Lactic acidosis secondary to above  Hemodynamically stable with low suspicion for infection / sepsis.  Likely related to liver disease      C. difficile colitis  D iagnosed during his last admission CT A/P with circumferential wall thickening of rectum suggesting distal colitis/proctitis. procalcitonin low at 0.07. WBC 7.7. Afebrile.  Not taking vancomycin and currently having 2-3 loose stools a day on admission.  - Finished a course of oral vancomycin 125 mg every 6 hours    NGUYEN on CKD stage III  Cr 1.63 on admission , Baseline creatinine 1.4-1.9.  Cr had bumped up to 2.1 over the past week, likely due to diuretics  - Continue to monitor  - Diuretics stopped      Chronic anemia, nl MCV, stable  Hemoglobins initially in the 10s.  Have drifted down slightly to 8-9     BPH  - Continue flomax     JANIS,   - home CPAP ordered     Malnutrition:    - Level of malnutrition: Non-Severe   - Based on: mild (or greater) subcutaneous fat loss, mild (or greater) muscle loss         Diet: 2 Gram Sodium Diet  Snacks/Supplements Adult: Other; Chocolate Shake + 2 pkts benepro w/ lunch and dinner (RD); With Meals    DVT Prophylaxis: Pneumatic Compression Devices  Leger Catheter: not present  Code Status: Full Code           Disposition Plan   Expected discharge: TBD, undergoing court evaluation for commitment  Entered: Jeffrey Villagomez MD 11/01/2020, 2:05 PM       The patient's care was discussed with the Bedside Nurse, Care Coordinator/ and Patient.    Jeffrey MCKINNON  MD [place]  Hospitalist Service  [facility]  Contact information available via [facility] Paging/Directory    ______________________________________________________________________    Interval History   Patient seen and examined.  No acute events over night.  No fevers or chills.  No pain at this time.  No difficulty breathing.  No further episodes of diarrhea.  Patient is seen walking the hallways with no difficulty.  Complains of some anxiety for which she is getting as needed Atarax and Seroquel.      Data reviewed today: I reviewed all medications, new labs and imaging results over the last 24 hours. I personally reviewed no images or EKG's today.    Physical Exam   Vital Signs: Temp: 98.4  F (36.9  C) Temp src: Oral BP: 118/58 Pulse: 50   Resp: 16 SpO2: 98 % O2 Device: None (Room air)    Weight: 182 lbs 8.65 oz  General Appearance: Ambulating without any difficulty.  NAD   Respiratory: Clear to auscultation.  No respiratory distress  Cardiovascular: RRR.  No obvious murmurs  GI: Bowel sounds present.  Non-tender.  Abdomen is mildly distended but soft.  Skin: No rashes.  No cyanosis  Other: No edema.  No calf tenderness    Psych: Calm and pleasant, oriented x3    Data   Recent Labs   Lab 11/01/20  1010 10/31/20  1009 10/30/20  1151 10/29/20  1033 10/29/20  0938   INR  --   --   --  1.31*  --     133 130*  --  130*  130*   POTASSIUM 4.3 4.4 4.2  --  4.2  4.2   CHLORIDE 104 104 101  --  101  101   CO2 21 22 25  --  22  22   BUN 36* 34* 33*  --  33*  34*   CR 1.95* 2.05* 2.03*  --  2.11*  2.07*   ANIONGAP 8 7 4  --  7  7   KEV 8.5 8.6 8.4*  --  8.1*  8.1*   GLC 99 92 91  --  124*  125*   ALBUMIN  --   --   --   --  2.8*   PROTTOTAL  --   --   --   --  6.3*   BILITOTAL  --   --   --   --  0.3   ALKPHOS  --   --   --   --  173*   ALT  --   --   --   --  20   AST  --   --   --   --  22     No results found for this or any previous visit (from the past 24 hour(s)).

## 2020-11-01 NOTE — PLAN OF CARE
DATE & TIME: 10/31/20 0625-7364  Cognitive Concerns/ Orientation : A&O x4   BEHAVIOR & AGGRESSION TOOL COLOR: Green   ABNL VS/O2: VSS on RA ex mandi (MD aware)   MOBILITY: Independent in room, walker when ambulating in halls (pt aware that he cannot walk past nursing station)  PAIN MANAGMENT:  Denies pain   DIET: 2g Na  BOWEL/BLADDER: Continent, up to BR   ABNL LAB/BG: N/A  DRAIN/DEVICES:  Loss of PIV x2 on previous shift, no PIV in place. MD aware   SKIN: Scabbed, BLE trace edema, blanchable redness   TESTS/PROCEDURES: N/A  D/C DAY/GOALS/PLACE: Pending, see SW notes   OTHER IMPORTANT INFO: Enteric precautions maintained for C. Diff (abx complete). PRN Atarax and PRN Seroquel given at bedtime and in morning.

## 2020-11-01 NOTE — PLAN OF CARE
DATE & TIME: 10/31/20 6809-6599  Cognitive Concerns/ Orientation : A&Ox4   BEHAVIOR & AGGRESSION TOOL COLOR: Green   ABNL VS/O2: Bradycardia: HR 40's, asymptomatic, MD aware. Otherwise VSS, room air.  MOBILITY: Independent in room, walker while out on unit  PAIN MANAGMENT: Nicotine patch to L arm  DIET: 2g Na; eating 100%  BOWEL/BLADDER: Continent  ABNL LAB/BG: Na   DRAIN/DEVICES:  PIV infiltrated, left out and MD note written to address if needed.   SKIN: Scab to L wrist, bruising BUE  TESTS/PROCEDURES: n/a  D/C DAY/GOALS/PLACE: Pending commitment process.  OTHER IMPORTANT INFO: Enteric precautions maintained for C. Diff. Oral vancomycin compete.  Pt acknowledged that he is not allowed past the nursing station but can walk around it and down [address]. Seroquel and Atarax given for anxiety with some improvement.  GI/SW/Psych following.

## 2020-11-01 NOTE — PLAN OF CARE
DATE & TIME: 11/1/20 0644-4497  Cognitive Concerns/ Orientation : A&O x4   BEHAVIOR & AGGRESSION TOOL COLOR: Green   ABNL VS/O2: VSS on RA ex mandi (MD aware)   MOBILITY: Independent in room, walker when ambulating in halls (pt aware that he cannot walk past nursing station)  PAIN MANAGMENT:  Denies pain   DIET: 2g Na  BOWEL/BLADDER: Continent, up to BR   ABNL LAB/BG: N/A  DRAIN/DEVICES:  Loss of PIV x2 on previous shift, no PIV in place. MD aware   SKIN: Scabbed, BLE trace edema, blanchable redness   TESTS/PROCEDURES: N/A  D/C DAY/GOALS/PLACE: Pending, see SW notes  OTHER IMPORTANT INFO: Enteric precautions maintained for C. Diff (abx complete). PRN Atarax and PRN Seroquel available.

## 2020-11-01 NOTE — PROGRESS NOTES
DATE & TIME: 11/1/20 5714-0715  Cognitive Concerns/ Orientation : A&O x4   BEHAVIOR & AGGRESSION TOOL COLOR: Green   ABNL VS/O2: VSS on RA ex mnadi (MD aware)   MOBILITY: Independent, ambulates hallways frequently  PAIN MANAGMENT:  Denies pain   DIET: 2g Na  BOWEL/BLADDER: Continent, up to BR   ABNL LAB/BG: N/A  DRAIN/DEVICES:  No PIV in place; MD aware   SKIN: Scabbed, BLE trace edema  TESTS/PROCEDURES: N/A  D/C DAY/GOALS/PLACE: Pending, see SW notes  OTHER IMPORTANT INFO: Enteric precautions maintained for C. Diff (abx complete). PRN Atarax and PRN Seroquel available.

## 2020-11-02 LAB
ANION GAP SERPL CALCULATED.3IONS-SCNC: 5 MMOL/L (ref 3–14)
BUN SERPL-MCNC: 34 MG/DL (ref 7–30)
CALCIUM SERPL-MCNC: 8.5 MG/DL (ref 8.5–10.1)
CHLORIDE SERPL-SCNC: 100 MMOL/L (ref 94–109)
CO2 SERPL-SCNC: 25 MMOL/L (ref 20–32)
CREAT SERPL-MCNC: 1.99 MG/DL (ref 0.66–1.25)
GFR SERPL CREATININE-BSD FRML MDRD: 34 ML/MIN/{1.73_M2}
GLUCOSE SERPL-MCNC: 115 MG/DL (ref 70–99)
POTASSIUM SERPL-SCNC: 4 MMOL/L (ref 3.4–5.3)
SODIUM SERPL-SCNC: 130 MMOL/L (ref 133–144)

## 2020-11-02 PROCEDURE — 36415 COLL VENOUS BLD VENIPUNCTURE: CPT | Performed by: INTERNAL MEDICINE

## 2020-11-02 PROCEDURE — 250N000013 HC RX MED GY IP 250 OP 250 PS 637: Performed by: STUDENT IN AN ORGANIZED HEALTH CARE EDUCATION/TRAINING PROGRAM

## 2020-11-02 PROCEDURE — 120N000001 HC R&B MED SURG/OB

## 2020-11-02 PROCEDURE — 99232 SBSQ HOSP IP/OBS MODERATE 35: CPT | Performed by: PSYCHIATRY & NEUROLOGY

## 2020-11-02 PROCEDURE — 250N000013 HC RX MED GY IP 250 OP 250 PS 637: Performed by: PHYSICIAN ASSISTANT

## 2020-11-02 PROCEDURE — 99232 SBSQ HOSP IP/OBS MODERATE 35: CPT | Performed by: STUDENT IN AN ORGANIZED HEALTH CARE EDUCATION/TRAINING PROGRAM

## 2020-11-02 PROCEDURE — 250N000013 HC RX MED GY IP 250 OP 250 PS 637: Performed by: INTERNAL MEDICINE

## 2020-11-02 PROCEDURE — 250N000013 HC RX MED GY IP 250 OP 250 PS 637: Performed by: PSYCHIATRY & NEUROLOGY

## 2020-11-02 PROCEDURE — 80048 BASIC METABOLIC PNL TOTAL CA: CPT | Performed by: INTERNAL MEDICINE

## 2020-11-02 RX ORDER — QUETIAPINE FUMARATE 100 MG/1
200 TABLET, FILM COATED ORAL AT BEDTIME
Status: DISCONTINUED | OUTPATIENT
Start: 2020-11-02 | End: 2020-11-18 | Stop reason: HOSPADM

## 2020-11-02 RX ORDER — MIRTAZAPINE 15 MG/1
15 TABLET, FILM COATED ORAL AT BEDTIME
Status: DISCONTINUED | OUTPATIENT
Start: 2020-11-02 | End: 2020-11-18 | Stop reason: HOSPADM

## 2020-11-02 RX ORDER — QUETIAPINE FUMARATE 100 MG/1
100 TABLET, FILM COATED ORAL 2 TIMES DAILY WITH MEALS
Status: DISCONTINUED | OUTPATIENT
Start: 2020-11-02 | End: 2020-11-18 | Stop reason: HOSPADM

## 2020-11-02 RX ADMIN — CARBIDOPA AND LEVODOPA 7.5 MG: 50; 200 TABLET, EXTENDED RELEASE ORAL at 17:16

## 2020-11-02 RX ADMIN — MULTIPLE VITAMINS W/ MINERALS TAB 1 TABLET: TAB at 08:40

## 2020-11-02 RX ADMIN — FOLIC ACID 1 MG: 1 TABLET ORAL at 08:40

## 2020-11-02 RX ADMIN — CARBIDOPA AND LEVODOPA 7.5 MG: 50; 200 TABLET, EXTENDED RELEASE ORAL at 11:40

## 2020-11-02 RX ADMIN — CARBIDOPA AND LEVODOPA 7.5 MG: 50; 200 TABLET, EXTENDED RELEASE ORAL at 08:39

## 2020-11-02 RX ADMIN — LIDOCAINE 1 PATCH: 560 PATCH PERCUTANEOUS; TOPICAL; TRANSDERMAL at 20:36

## 2020-11-02 RX ADMIN — QUETIAPINE FUMARATE 100 MG: 100 TABLET ORAL at 05:11

## 2020-11-02 RX ADMIN — MAGNESIUM OXIDE 400 MG: 400 TABLET ORAL at 08:39

## 2020-11-02 RX ADMIN — MIRTAZAPINE 15 MG: 15 TABLET, FILM COATED ORAL at 21:56

## 2020-11-02 RX ADMIN — PANTOPRAZOLE SODIUM 40 MG: 40 TABLET, DELAYED RELEASE ORAL at 08:40

## 2020-11-02 RX ADMIN — QUETIAPINE FUMARATE 200 MG: 100 TABLET ORAL at 21:56

## 2020-11-02 RX ADMIN — PANTOPRAZOLE SODIUM 40 MG: 40 TABLET, DELAYED RELEASE ORAL at 20:37

## 2020-11-02 RX ADMIN — TAMSULOSIN HYDROCHLORIDE 0.4 MG: 0.4 CAPSULE ORAL at 08:40

## 2020-11-02 RX ADMIN — NICOTINE 1 PATCH: 21 PATCH, EXTENDED RELEASE TRANSDERMAL at 08:41

## 2020-11-02 RX ADMIN — QUETIAPINE FUMARATE 100 MG: 100 TABLET ORAL at 17:16

## 2020-11-02 RX ADMIN — VORTIOXETINE 10 MG: 10 TABLET, FILM COATED ORAL at 08:39

## 2020-11-02 ASSESSMENT — ACTIVITIES OF DAILY LIVING (ADL)
ADLS_ACUITY_SCORE: 11

## 2020-11-02 NOTE — PROGRESS NOTES
Medicine Progress Note - Hospitalist Service       Date of Admission:  10/14/2020  Assessment & Plan     Jim Richard is a 65 year old male was admitted on 10/14/2020 with PMH use disorder, cirrhosis, depression, recent hospitalization for intentional overdose, multiple hospitalization for intoxication who is being admitted again on 10/14/2020, due to continued drinking and suicidal ideation, stating that he would take all of his medications at once.  Recently hospitalized from 10/6 - 10/11 for alcohol intoxication with metabolic acidosis and C. difficile.  After discharge he states he stopped taking all of his medications and has been drinking because he does not care whether he lives or dies. Currently on a hold pending court hearing for commitment.      Depression, anxiety, suicidal ideation   Acute alcohol intoxication  Severe alcohol use disorder - multiple recent admission for intoxication at risk of life-threateningillness/death  Patient has been through treatment multiple times in the past. He states he stating drinking again right after discharge. BAL 0.36 at admission.    - CIWA stopped  - Psychiatry was consulted-> has initiated a commitment process due to his recidivism, inability to maintain sobriety, multiple admissions secondary to alcohol-related issues and expressing suicidal ideation while intoxicated.    - Psych saw on 11/2 and adjusted seroquel, remeron.   - Psych discontinued hydroxyzine  - Court appearance is currently scheduled for next week      Alcoholic cirrhosis with history of esophageal varices and ascites   History of GI bleed.  Grade 1 esophageal varices with portal gastropathy. He c/o diffuse abdominal discomfort and has obvious fluid wave on exam with diffuse mild tenderness on exam. Last paracentesis on 10/9 was negative for SBP and patient currently refusing paracentesis stating pain no different now than prior to that procedure. S/p  paracentesis 10/19.  - Continue PPI   - PTA Atenolol stopped  - Nadolol started 10/29/2020.  Hold parameters for heart rate less than 55.  - GI following and appreciate their recommendations.  Stopped diuretics     Bradycardia  Suspect due to initiation of Nadolol while also receiving Atenolol   - Hold parameters for Nadolol in place for heart rate less than 55  - Atenolol has been discontinued   - Continue to monitor     Mild hyponatremia  Sodium stable in the low 130s  - Continue to monitor      Lactic acidosis secondary to above  Hemodynamically stable with low suspicion for infection / sepsis.  Likely related to liver disease      C. difficile colitis  D iagnosed during his last admission CT A/P with circumferential wall thickening of rectum suggesting distal colitis/proctitis. procalcitonin low at 0.07. WBC 7.7. Afebrile.  Not taking vancomycin and currently having 2-3 loose stools a day on admission.  - Finished a course of oral vancomycin 125 mg every 6 hours    NGUYEN on CKD stage III  Cr 1.63 on admission , Baseline creatinine 1.4-1.9.  Cr had bumped up to 2.1 over the past week, likely due to diuretics  - Continue to monitor  - Diuretics stopped      Chronic anemia, nl MCV, stable  Hemoglobins initially in the 10s.  Have drifted down slightly to 8-9     BPH  - Continue flomax     JANIS,   - home CPAP ordered     Malnutrition:    - Level of malnutrition: Non-Severe   - Based on: mild (or greater) subcutaneous fat loss, mild (or greater) muscle loss         Diet: 2 Gram Sodium Diet  Snacks/Supplements Adult: Other; Chocolate Shake + 2 pkts benepro w/ lunch and dinner (RD); With Meals    DVT Prophylaxis: Pneumatic Compression Devices  Leger Catheter: not present  Code Status: Full Code           Disposition Plan   Expected discharge: TBD, undergoing court evaluation for commitment  Entered: Jeffrey Villagomez MD 11/02/2020, 3:50 PM       The patient's care was discussed with the Bedside Nurse, Care  Coordinator/ and Patient.    Jeffrey Vlilagomez MD  Hospitalist Service  [facility]  Contact information available via [facility] Paging/Directory    ______________________________________________________________________    Interval History   Patient seen and examined.  No acute events over night.  No fevers or chills.  No pain at this time.  No difficulty breathing.  No  further episodes of diarrhea.  Patient is seen walking the hallways with no difficulty.  Ready to leave the hospital. Declines assistance with voting.       Data reviewed today: I reviewed all medications, new labs and imaging results over the last 24 hours. I personally reviewed no images or EKG's today.    Physical Exam   Vital Signs: Temp: 98.3  F (36.8  C) Temp src: Oral BP: 139/72 Pulse: (!) 45   Resp: 16 SpO2: 99 % O2 Device: None (Room air)    Weight: 186 lbs 4.62 oz  General Appearance: Ambulating without any difficulty.  NAD   Respiratory: Clear to auscultation.  No respiratory distress  Cardiovascular: RRR.  No obvious murmurs  GI: Bowel sounds present.  Non-tender.  Abdomen is mildly distended but soft.  Skin: No rashes.  No cyanosis  Other: No edema.  No calf tenderness    Psych: Calm and pleasant, oriented x3    Data   Recent Labs   Lab 11/02/20  0933 11/01/20  1010 10/31/20  1009 10/29/20  1033 10/29/20  1033 10/29/20  0938   INR  --   --   --   --  1.31*  --    * 133 133   < >  --  130*  130*   POTASSIUM 4.0 4.3 4.4   < >  --  4.2  4.2   CHLORIDE 100 104 104   < >  --  101  101   CO2 25 21 22   < >  --  22  22   BUN 34* 36* 34*   < >  --  33*  34*   CR 1.99* 1.95* 2.05*   < >  --  2.11*  2.07*   ANIONGAP 5 8 7   < >  --  7  7   KEV 8.5 8.5 8.6   < >  --  8.1*  8.1*   * 99 92   < >  --  124*  125*   ALBUMIN  --   --   --   --   --  2.8*   PROTTOTAL  --   --   --   --   --  6.3*   BILITOTAL  --   --   --   --   --  0.3   ALKPHOS  --   --   --   --   --  173*   ALT  --   --   --    --   --  20   AST  --   --   --   --   --  22    < > = values in this interval not displayed.     No results found for this or any previous visit (from the past 24 hour(s)).

## 2020-11-02 NOTE — PLAN OF CARE
DATE & TIME: 11/1/20 1937-6111  Cognitive Concerns/ Orientation : A&O x4, pleasant, calm & cooperative   BEHAVIOR & AGGRESSION TOOL COLOR: Green             ABNL VS/O2: VSS on RA ex mandi at times  MOBILITY: Independent, ambulating frequently. Walker at times in hallway. Pt aware of unit boundaries for ambulating.   PAIN MANAGMENT:  Denies pain. Lidocaine patch in place, L hip  DIET: 2g Na  BOWEL/BLADDER: Continent, up independently.  ABNL LAB/BG: Creat 1.95  DRAIN/DEVICES:  No PIV in place; MD aware   SKIN: Scabbed, blanchable redness, BLE trace edema  TESTS/PROCEDURES: N/A  D/C DAY/GOALS/PLACE: Pending, see SW notes  OTHER IMPORTANT INFO: Enteric precautions maintained for C. Diff (abx complete). PRN Atarax given at bedtime and PRN Seroquel given in AM. Pt states he would like another psych consult.

## 2020-11-02 NOTE — PLAN OF CARE
DATE & TIME: 11/1/20 1900-2300  Cognitive Concerns/ Orientation : A&O x4   BEHAVIOR & AGGRESSION TOOL COLOR: Green             ABNL VS/O2: VSS on RA ex mandi at times  MOBILITY: Independent, ambulating frequently  PAIN MANAGMENT:  Generalized pain, declines pain meds, lidocaine patch works well   DIET: 2g Na  BOWEL/BLADDER: Continent, up independently  ABNL LAB/BG: N/A  DRAIN/DEVICES:  No PIV in place; MD aware   SKIN: Scabbed, BLE trace edema  TESTS/PROCEDURES: N/A  D/C DAY/GOALS/PLACE: Pending, see SW notes  OTHER IMPORTANT INFO: Enteric precautions maintained for C. Diff (abx complete). Gave PRN seroquel and trazodone, would like atarax at 0000.

## 2020-11-02 NOTE — CONSULTS
"[facility] Progress Note      Interval History:   Pt seen on station 66.  Jim is currently going through a commitment process, the  is exploring placement at [place].  He is stressed by all of this, anxious, describes himself as being depressed, having difficulty with sleep at night.  He is not suicidal.  He worries about selling his house, where he is going to live.  His motivation to stay sober remains marginal.  He is currently on multiple meds, we discussed continuation of his Trintellix, lowering mirtazapine as smaller doses sometimes help sleep.  He is using Seroquel 100 mg 4 times daily in divided doses with some benefit, and I suggested that we consolidate some of this at bedtime.  Trazodone, melatonin, and Vistaril have also been ordered.  I am concerned about polypharmacy given age and fall risk.  We discussed realistic expectations with his current medication and the situational nature of his \"stress\"  The Review of Systems is negative other than noted in the HPI     Medications:     Current Facility-Administered Medications   Medication     acetaminophen (TYLENOL) tablet 650 mg     bisacodyl (DULCOLAX) Suppository 10 mg     fluticasone-vilanterol (BREO ELLIPTA) 200-25 MCG/INH inhaler 1 puff     folic acid (FOLVITE) tablet 1 mg     hydrOXYzine (ATARAX) tablet 25-50 mg     hydrOXYzine (ATARAX) tablet 50 mg     Lidocaine (LIDOCARE) 4 % Patch 1 patch    And     lidocaine patch in PLACE     lidocaine (LMX4) cream     lidocaine 1 % 0.1-1 mL     magnesium oxide (MAG-OX) tablet 400 mg     May take regular AM medications except those listed below.     melatonin tablet 1 mg     midodrine (PROAMATINE) tablet 7.5 mg     mirtazapine (REMERON) tablet 30 mg     multivitamin w/minerals (THERA-VIT-M) tablet 1 tablet     nadolol (CORGARD) tablet 40 mg     naloxone (NARCAN) injection 0.1-0.4 mg     nicotine (COMMIT) lozenge 2 mg     nicotine (NICODERM CQ) 21 MG/24HR 24 hr patch " 1 patch     nicotine Patch in Place     ondansetron (ZOFRAN-ODT) ODT tab 4 mg    Or     ondansetron (ZOFRAN) injection 4 mg     pantoprazole (PROTONIX) EC tablet 40 mg     polyethylene glycol (MIRALAX) Packet 17 g     QUEtiapine (SEROquel) tablet 100 mg     senna-docusate (SENOKOT-S/PERICOLACE) 8.6-50 MG per tablet 1 tablet    Or     senna-docusate (SENOKOT-S/PERICOLACE) 8.6-50 MG per tablet 2 tablet     sodium chloride (PF) 0.9% PF flush 3 mL     sodium chloride (PF) 0.9% PF flush 3 mL     tamsulosin (FLOMAX) capsule 0.4 mg     traZODone (DESYREL) tablet 100 mg     vortioxetine (TRINTELLIX) tablet 10 mg         Mental Status Examination:     Appearance:  awake, alert  Eye Contact:  fair  Speech:  clear, coherent  Psychomotor Behavior:  no evidence of tardive dyskinesia, dystonia, or tics  Mood:  depressed   Affect:  intensity is blunted  Thought Process:  logical no loose associations  Thought Content:  no evidence of suicidal ideation or homicidal ideation  Oriented to:  time, person, and place  Attention Span and Concentration:  intact  Recent and Remote Memory:  intact  Fund of Knowledge: appropriate  Muscle Strength and Tone: normal  Gait and Station: Normal  Insight:  limited  Judgment:  poor        Labs:     Recent Results (from the past 24 hour(s))   Basic metabolic panel    Collection Time: 11/01/20 10:10 AM   Result Value Ref Range    Sodium 133 133 - 144 mmol/L    Potassium 4.3 3.4 - 5.3 mmol/L    Chloride 104 94 - 109 mmol/L    Carbon Dioxide 21 20 - 32 mmol/L    Anion Gap 8 3 - 14 mmol/L    Glucose 99 70 - 99 mg/dL    Urea Nitrogen 36 (H) 7 - 30 mg/dL    Creatinine 1.95 (H) 0.66 - 1.25 mg/dL    GFR Estimate 35 (L) >60 mL/min/[1.73_m2]    GFR Estimate If Black 40 (L) >60 mL/min/[1.73_m2]    Calcium 8.5 8.5 - 10.1 mg/dL         Impression:   Jim is a 65-year-old gentleman currently under commitment, needing long-term placement to achieve sobriety and safety.  He has some unrelenting complaints of  anxiety and sleep difficulty which seem to have a situational component.    DIagnoses:   Alcohol use disorder, Severe  Major Depressive disorder, recurrent, severe            Plan:   1. Written information given on medications. Side effects, risks, benefits reviewed.  2. Continue trintellix for depression, 10 mg daily.  We will adjust Seroquel to 100 mg twice daily, 200 mg at bedtime and lower Remeron to 15 mg at bedtime.  Hydroxyzine and trazodone will be discontinued due to polypharmacy concerns   3.  Currently awaiting placement with commitment process,  is exploring [place]  Attestation:  Patient has been seen and evaluated by me,  Donell Davis MD

## 2020-11-02 NOTE — PLAN OF CARE
DATE & TIME: 11/2/2020 2743-2780   Cognitive Concerns/ Orientation : A&OX4   BEHAVIOR & AGGRESSION TOOL COLOR: Green  CIWA SCORE: NA  ABNL VS/O2: VSS  MOBILITY: Ind  PAIN MANAGMENT: Denies  DIET: 2 gram NA diet  BOWEL/BLADDER: Continent  ABNL LAB/BG:   DRAIN/DEVICES: NA  TELEMETRY RHYTHM: NA  SKIN: Ecchymosis, scabbing  TESTS/PROCEDURES: NA  D/C DAY/GOALS/PLACE: Pending placement  OTHER IMPORTANT INFO: Pt on enteric iso for c-diff.  No looses stools noted this shift.  Up ind in halls  MD/RN ROUNDING SIGNED OFF D/E SHIFT: Y  COMMIT TO SIT DONE AND SIGNED OFF Y

## 2020-11-03 ENCOUNTER — APPOINTMENT (OUTPATIENT)
Dept: ULTRASOUND IMAGING | Facility: CLINIC | Age: 65
DRG: 896 | End: 2020-11-03
Attending: STUDENT IN AN ORGANIZED HEALTH CARE EDUCATION/TRAINING PROGRAM
Payer: MEDICARE

## 2020-11-03 LAB
ALBUMIN FLD-MCNC: 1 G/DL
ALBUMIN SERPL-MCNC: 2.8 G/DL (ref 3.4–5)
ANION GAP SERPL CALCULATED.3IONS-SCNC: 5 MMOL/L (ref 3–14)
BUN SERPL-MCNC: 32 MG/DL (ref 7–30)
CALCIUM SERPL-MCNC: 8.4 MG/DL (ref 8.5–10.1)
CHLORIDE SERPL-SCNC: 101 MMOL/L (ref 94–109)
CO2 SERPL-SCNC: 24 MMOL/L (ref 20–32)
CREAT SERPL-MCNC: 1.85 MG/DL (ref 0.66–1.25)
GFR SERPL CREATININE-BSD FRML MDRD: 37 ML/MIN/{1.73_M2}
GLUCOSE SERPL-MCNC: 130 MG/DL (ref 70–99)
POTASSIUM SERPL-SCNC: 3.9 MMOL/L (ref 3.4–5.3)
PROT FLD-MCNC: 1.9 G/DL
SODIUM SERPL-SCNC: 130 MMOL/L (ref 133–144)
SPECIMEN SOURCE FLD: NORMAL
SPECIMEN SOURCE FLD: NORMAL

## 2020-11-03 PROCEDURE — 84157 ASSAY OF PROTEIN OTHER: CPT | Performed by: STUDENT IN AN ORGANIZED HEALTH CARE EDUCATION/TRAINING PROGRAM

## 2020-11-03 PROCEDURE — 49083 ABD PARACENTESIS W/IMAGING: CPT

## 2020-11-03 PROCEDURE — 36415 COLL VENOUS BLD VENIPUNCTURE: CPT | Performed by: INTERNAL MEDICINE

## 2020-11-03 PROCEDURE — 999N000154 HC STATISTIC RADIOLOGY XRAY, US, CT, MAR, NM

## 2020-11-03 PROCEDURE — 250N000013 HC RX MED GY IP 250 OP 250 PS 637: Performed by: PSYCHIATRY & NEUROLOGY

## 2020-11-03 PROCEDURE — 250N000009 HC RX 250: Performed by: RADIOLOGY

## 2020-11-03 PROCEDURE — 250N000013 HC RX MED GY IP 250 OP 250 PS 637: Performed by: INTERNAL MEDICINE

## 2020-11-03 PROCEDURE — 250N000013 HC RX MED GY IP 250 OP 250 PS 637: Performed by: STUDENT IN AN ORGANIZED HEALTH CARE EDUCATION/TRAINING PROGRAM

## 2020-11-03 PROCEDURE — 80048 BASIC METABOLIC PNL TOTAL CA: CPT | Performed by: INTERNAL MEDICINE

## 2020-11-03 PROCEDURE — 0W9G30Z DRAINAGE OF PERITONEAL CAVITY WITH DRAINAGE DEVICE, PERCUTANEOUS APPROACH: ICD-10-PCS | Performed by: RADIOLOGY

## 2020-11-03 PROCEDURE — 250N000013 HC RX MED GY IP 250 OP 250 PS 637: Performed by: HOSPITALIST

## 2020-11-03 PROCEDURE — 250N000013 HC RX MED GY IP 250 OP 250 PS 637: Performed by: PHYSICIAN ASSISTANT

## 2020-11-03 PROCEDURE — 82040 ASSAY OF SERUM ALBUMIN: CPT | Performed by: INTERNAL MEDICINE

## 2020-11-03 PROCEDURE — 89051 BODY FLUID CELL COUNT: CPT | Performed by: STUDENT IN AN ORGANIZED HEALTH CARE EDUCATION/TRAINING PROGRAM

## 2020-11-03 PROCEDURE — 120N000001 HC R&B MED SURG/OB

## 2020-11-03 PROCEDURE — 99232 SBSQ HOSP IP/OBS MODERATE 35: CPT | Performed by: STUDENT IN AN ORGANIZED HEALTH CARE EDUCATION/TRAINING PROGRAM

## 2020-11-03 PROCEDURE — 82042 OTHER SOURCE ALBUMIN QUAN EA: CPT | Performed by: STUDENT IN AN ORGANIZED HEALTH CARE EDUCATION/TRAINING PROGRAM

## 2020-11-03 RX ORDER — LIDOCAINE HYDROCHLORIDE 10 MG/ML
10 INJECTION, SOLUTION EPIDURAL; INFILTRATION; INTRACAUDAL; PERINEURAL ONCE
Status: COMPLETED | OUTPATIENT
Start: 2020-11-03 | End: 2020-11-03

## 2020-11-03 RX ORDER — DEXTROSE MONOHYDRATE 25 G/50ML
25-50 INJECTION, SOLUTION INTRAVENOUS
Status: DISCONTINUED | OUTPATIENT
Start: 2020-11-03 | End: 2020-11-03

## 2020-11-03 RX ORDER — LIDOCAINE 40 MG/G
CREAM TOPICAL
Status: DISCONTINUED | OUTPATIENT
Start: 2020-11-03 | End: 2020-11-03

## 2020-11-03 RX ORDER — ALBUMIN (HUMAN) 12.5 G/50ML
12.5 SOLUTION INTRAVENOUS ONCE
Status: DISCONTINUED | OUTPATIENT
Start: 2020-11-03 | End: 2020-11-03

## 2020-11-03 RX ORDER — NICOTINE POLACRILEX 4 MG
15-30 LOZENGE BUCCAL
Status: DISCONTINUED | OUTPATIENT
Start: 2020-11-03 | End: 2020-11-03

## 2020-11-03 RX ADMIN — NADOLOL 40 MG: 40 TABLET ORAL at 08:18

## 2020-11-03 RX ADMIN — NICOTINE 1 PATCH: 21 PATCH, EXTENDED RELEASE TRANSDERMAL at 08:17

## 2020-11-03 RX ADMIN — QUETIAPINE FUMARATE 200 MG: 100 TABLET ORAL at 21:51

## 2020-11-03 RX ADMIN — MULTIPLE VITAMINS W/ MINERALS TAB 1 TABLET: TAB at 08:17

## 2020-11-03 RX ADMIN — CARBIDOPA AND LEVODOPA 7.5 MG: 50; 200 TABLET, EXTENDED RELEASE ORAL at 13:05

## 2020-11-03 RX ADMIN — CARBIDOPA AND LEVODOPA 7.5 MG: 50; 200 TABLET, EXTENDED RELEASE ORAL at 08:17

## 2020-11-03 RX ADMIN — CARBIDOPA AND LEVODOPA 7.5 MG: 50; 200 TABLET, EXTENDED RELEASE ORAL at 17:44

## 2020-11-03 RX ADMIN — PANTOPRAZOLE SODIUM 40 MG: 40 TABLET, DELAYED RELEASE ORAL at 08:18

## 2020-11-03 RX ADMIN — TAMSULOSIN HYDROCHLORIDE 0.4 MG: 0.4 CAPSULE ORAL at 08:18

## 2020-11-03 RX ADMIN — LIDOCAINE 1 PATCH: 560 PATCH PERCUTANEOUS; TOPICAL; TRANSDERMAL at 20:21

## 2020-11-03 RX ADMIN — VORTIOXETINE 10 MG: 10 TABLET, FILM COATED ORAL at 08:17

## 2020-11-03 RX ADMIN — MAGNESIUM OXIDE 400 MG: 400 TABLET ORAL at 08:17

## 2020-11-03 RX ADMIN — MIRTAZAPINE 15 MG: 15 TABLET, FILM COATED ORAL at 21:51

## 2020-11-03 RX ADMIN — PANTOPRAZOLE SODIUM 40 MG: 40 TABLET, DELAYED RELEASE ORAL at 20:21

## 2020-11-03 RX ADMIN — QUETIAPINE FUMARATE 100 MG: 100 TABLET ORAL at 08:18

## 2020-11-03 RX ADMIN — LIDOCAINE HYDROCHLORIDE 10 ML: 10 INJECTION, SOLUTION EPIDURAL; INFILTRATION; INTRACAUDAL; PERINEURAL at 14:41

## 2020-11-03 RX ADMIN — FOLIC ACID 1 MG: 1 TABLET ORAL at 08:18

## 2020-11-03 RX ADMIN — QUETIAPINE FUMARATE 100 MG: 100 TABLET ORAL at 17:44

## 2020-11-03 ASSESSMENT — ACTIVITIES OF DAILY LIVING (ADL)
ADLS_ACUITY_SCORE: 11

## 2020-11-03 NOTE — PROGRESS NOTES
[facility]    Medicine Progress Note - Hospitalist Service       Date of Admission:  10/14/2020  Date of Service: 11/03/2020    Assessment & Plan     Jim Richard is a 65 year old male was admitted on 10/14/2020 with PMH use disorder, cirrhosis, depression, recent hospitalization for intentional overdose, multiple hospitalization for intoxication who is being admitted again on 10/14/2020, due to continued drinking and suicidal ideation, stating that he would take all of his medications at once.  Recently hospitalized from 10/6 - 10/11 for alcohol intoxication with metabolic acidosis and C. difficile.  After discharge he states he stopped taking all of his medications and has been drinking because he does not care whether he lives or dies. Currently on a hold pending court hearing for commitment.      Depression, anxiety, suicidal ideation   Acute alcohol intoxication  Severe alcohol use disorder - multiple recent admission for intoxication at risk of life-threateningillness/death  Patient has been through treatment multiple times in the past. He states he stating drinking again right after discharge. BAL 0.36 at admission.    - CIWA stopped  - Psychiatry was consulted-> has initiated a commitment process due to his recidivism, inability to maintain sobriety, multiple admissions secondary to alcohol-related issues and expressing suicidal ideation while intoxicated.    - Psych saw on 11/2 and adjusted seroquel, remeron.   - Psych discontinued hydroxyzine  - Court appearance is currently scheduled for next week      Alcoholic cirrhosis with history of esophageal varices and ascites   History of GI bleed.  Grade 1 esophageal varices with portal gastropathy. He c/o diffuse abdominal discomfort and has obvious fluid wave on exam with diffuse mild tenderness on exam. Last paracentesis on 10/9 was negative for SBP and patient currently refusing paracentesis stating pain no different now than  prior to that procedure. S/p paracentesis 10/19.   - Paracentesis today  - Continue PPI   - PTA Atenolol stopped  - Nadolol started 10/29/2020.  Hold parameters for heart rate less than 55.  - GI following and appreciate their recommendations.  Stopped diuretics     Bradycardia  Suspect due to initiation of Nadolol while also receiving Atenolol   - Hold parameters for Nadolol in place for heart rate less than 55  - Atenolol has been discontinued   - Continue to monitor     Mild hyponatremia  Sodium stable in the low 130s  - Continue to monitor      Lactic acidosis secondary to above  Hemodynamically stable with low suspicion for infection / sepsis.  Likely related to liver disease      C. difficile colitis  D iagnosed during his last admission CT A/P with circumferential wall thickening of rectum suggesting distal colitis/proctitis. procalcitonin low at 0.07. WBC 7.7. Afebrile.  Not taking vancomycin and currently having 2-3 loose stools a day on admission.  - Finished a course of oral vancomycin 125 mg every 6 hours    NGUYEN on CKD stage III  Cr 1.63 on admission , Baseline creatinine 1.4-1.9.  Cr had bumped up to 2.1 over the past week, likely due to diuretics  - Continue to monitor  - Diuretics stopped      Chronic anemia, nl MCV, stable  Hemoglobins initially in the 10s.  Have drifted down slightly to 8-9     BPH  - Continue flomax     JANIS,   - home CPAP ordered     Malnutrition:    - Level of malnutrition: Non-Severe   - Based on: mild (or greater) subcutaneous fat loss, mild (or greater) muscle loss         Diet: Snacks/Supplements Adult: Other; Chocolate Shake + 2 pkts benepro w/ lunch and dinner (RD); With Meals    DVT Prophylaxis: Pneumatic Compression Devices  Leger Catheter: not present  Code Status: Full Code           Disposition Plan   Expected discharge: TBD, undergoing court evaluation for commitment  Entered: Chivo Murcia MD 11/03/2020, 4:01 PM       The patient's care was discussed with the Bedside  Nurse, Care Coordinator/ and Patient.    Chivo Murcia MD  Hospitalist Service  [facility]  Contact information available via [facility] Paging/Directory    ______________________________________________________________________    Interval History      Patient seen and examined.  No acute events over night.  No fevers or chills.  No pain at this time.  No difficulty breathing.  No  further episodes of diarrhea.  Would like to have paracentesis, feels abdomen markedly distended.     Data reviewed today: I reviewed all medications, new labs and imaging results over the last 24 hours. I personally reviewed no images or EKG's today.    Physical Exam   Vital Signs: Temp: 98.7  F (37.1  C) Temp src: Oral BP: 131/64 Pulse: 50   Resp: 18 SpO2: 98 % O2 Device: None (Room air)    Weight: 185 lbs 11.2 oz  General Appearance: Ambulating without any difficulty.  NAD   Respiratory: Clear to auscultation.  No respiratory distress  Cardiovascular: RRR.  No obvious murmurs  GI: Bowel sounds present.  Non-tender.  Abdomen is mildly distended but soft.  Skin: No rashes.  No cyanosis  Other: No edema.  No calf tenderness    Psych: Calm and pleasant, oriented x3    Data   Recent Labs   Lab 11/03/20  1021 11/02/20  0933 11/01/20  1010 10/29/20  1033 10/29/20  1033 10/29/20  0938   INR  --   --   --   --  1.31*  --    * 130* 133   < >  --  130*  130*   POTASSIUM 3.9 4.0 4.3   < >  --  4.2  4.2   CHLORIDE 101 100 104   < >  --  101  101   CO2 24 25 21   < >  --  22  22   BUN 32* 34* 36*   < >  --  33*  34*   CR 1.85* 1.99* 1.95*   < >  --  2.11*  2.07*   ANIONGAP 5 5 8   < >  --  7  7   KEV 8.4* 8.5 8.5   < >  --  8.1*  8.1*   * 115* 99   < >  --  124*  125*   ALBUMIN 2.8*  --   --   --   --  2.8*   PROTTOTAL  --   --   --   --   --  6.3*   BILITOTAL  --   --   --   --   --  0.3   ALKPHOS  --   --   --   --   --  173*   ALT  --   --   --   --   --  20   AST  --   --   --   --   --  22     < > = values in this interval not displayed.     Recent Results (from the past 24 hour(s))   US Paracentesis    Narrative    US PARACENTESIS 11/3/2020 3:21 PM    CLINICAL HISTORY: HIGH VOLUME paracentesis with or without diagnostic  fluid analysis with labs to be drawn if ordered. Total paracentesis  volume as much as possible.    PROCEDURE: Informed consent obtained. Time out performed. The abdomen  was prepped and draped in a sterile fashion. 10 mL of 1% lidocaine was  infused into local soft tissues. A 5 Khmer catheter system was  introduced into the abdominal ascites under ultrasound guidance.    1.8 liters of clear fluid were removed and sent to lab if requested.    Patient tolerated procedure well.    Ultrasound imaging was obtained and placed in the patient's permanent  medical record.      Impression    IMPRESSION:  1.  Status post ultrasound-guided paracentesis.    MACKENZIE DYE MD

## 2020-11-03 NOTE — PROVIDER NOTIFICATION
MD Notification    Notified Person: MD Murcia    Notification Date/Time: 11/3/20 2517    Notification Interaction: Web page    Purpose of Notification: Pt requesting to see and speak with MD about medications and Paracentesis.     Orders Received:    Comments:

## 2020-11-03 NOTE — PROGRESS NOTES
Care Suites Procedure Nursing Note    Patient Information  Name: Jim Richard  Age: 65 year old    Procedure  Procedure: Paracentesis: patient arrived to department via cart, intake info taken. Ultrasound tech performed preliminary scan to find pockets of fluid. MD arrived to explain procedure, obtained consent. Time out was then done. Procedure begun, no issues, drainage in process. Patient was monitored with BP and O2 sats. Patient tolerated well. VSS. 1750 cc yellow fluid removed from abdomen w/o difficulty. Bandaid applied to right side abdominal site.     1455- Pt back to 607-2 per cart & transport.   Procedure start time: 1440  Procedure complete time: 1453  Concerns/abnormal assessment: n/a  If abnormal assessment, provider notified: N/A  Plan/Other: return to inpatient room    Alis Ramos RN

## 2020-11-03 NOTE — PLAN OF CARE
DATE & TIME: 11/1954-3410   Cognitive Concerns/ Orientation : A&Ox4  BEHAVIOR & AGGRESSION TOOL COLOR: Green  CIWA SCORE: NA  ABNL VS/O2:  VSS on RA  MOBILITY: Ind  PAIN MANAGMENT: Denies  DIET: 2g Na  BOWEL/BLADDER: Up to BR  ABNL LAB/BG: Na 130, Creat 1.85, GFR 37, 43  DRAIN/DEVICES: No PIV   TELEMETRY RHYTHM: NA  SKIN: Bruised, scabs  TESTS/PROCEDURES: Paracentesis this afternoon  D/C DAY/GOALS/PLACE: Pending placement  OTHER IMPORTANT INFO: Nicotine patch to L arm. Pt on court hold. Court date sched for next week. Enteric precautions maintained.

## 2020-11-03 NOTE — PROVIDER NOTIFICATION
MD Notification    Notified Person: MD Murcia    Notification Date/Time: 11/3/20 1207    Notification Interaction: Web page     Purpose of Notification: Na 130, Creat 1.85 GFR 37, 43    Orders Received:    Comments:

## 2020-11-03 NOTE — PLAN OF CARE
DATE & TIME: 11/2 1900-0700  Cognitive Concerns/ Orientation : A&O x4, pleasant, calm & cooperative   BEHAVIOR & AGGRESSION TOOL COLOR: Green             ABNL VS/O2: VSS on RA ex mandi at times  MOBILITY: Independent, ambulating in halls frequently.   PAIN MANAGMENT:  Denies pain. Lidocaine patch in place, L hip  DIET: 2g Na  BOWEL/BLADDER: Continent, up independently.  ABNL LAB/BG: Creat 1.95, Na+ 130, Hgb 8.6  DRAIN/DEVICES:  No PIV in place; MD aware   SKIN: Scabbed, blanchable redness, BLE trace edema  TESTS/PROCEDURES: N/A  D/C DAY/GOALS/PLACE: Pending, has a court date next week  OTHER IMPORTANT INFO: Enteric precautions maintained for C. Diff (abx complete). Pt stated frustration about how some of his medications have been discontinued. Pt feels he needs them to sleep at night. Psych, GI following

## 2020-11-04 LAB
ANION GAP SERPL CALCULATED.3IONS-SCNC: 4 MMOL/L (ref 3–14)
APPEARANCE FLD: NORMAL
BUN SERPL-MCNC: 32 MG/DL (ref 7–30)
CALCIUM SERPL-MCNC: 8.5 MG/DL (ref 8.5–10.1)
CHLORIDE SERPL-SCNC: 103 MMOL/L (ref 94–109)
CO2 SERPL-SCNC: 25 MMOL/L (ref 20–32)
COLOR FLD: YELLOW
CREAT SERPL-MCNC: 2.1 MG/DL (ref 0.66–1.25)
GFR SERPL CREATININE-BSD FRML MDRD: 32 ML/MIN/{1.73_M2}
GLUCOSE SERPL-MCNC: 77 MG/DL (ref 70–99)
LYMPHOCYTES NFR FLD MANUAL: 14 %
MONOS+MACROS NFR FLD MANUAL: 65 %
NEUTS BAND NFR FLD MANUAL: 10 %
OTHER CELLS FLD MANUAL: 11 %
POTASSIUM SERPL-SCNC: 4.2 MMOL/L (ref 3.4–5.3)
SODIUM SERPL-SCNC: 132 MMOL/L (ref 133–144)
SPECIMEN SOURCE FLD: NORMAL
WBC # FLD AUTO: 456 /UL

## 2020-11-04 PROCEDURE — 36415 COLL VENOUS BLD VENIPUNCTURE: CPT | Performed by: INTERNAL MEDICINE

## 2020-11-04 PROCEDURE — 120N000001 HC R&B MED SURG/OB

## 2020-11-04 PROCEDURE — 250N000013 HC RX MED GY IP 250 OP 250 PS 637: Performed by: STUDENT IN AN ORGANIZED HEALTH CARE EDUCATION/TRAINING PROGRAM

## 2020-11-04 PROCEDURE — 80048 BASIC METABOLIC PNL TOTAL CA: CPT | Performed by: INTERNAL MEDICINE

## 2020-11-04 PROCEDURE — 250N000013 HC RX MED GY IP 250 OP 250 PS 637: Performed by: INTERNAL MEDICINE

## 2020-11-04 PROCEDURE — 250N000013 HC RX MED GY IP 250 OP 250 PS 637: Performed by: PSYCHIATRY & NEUROLOGY

## 2020-11-04 PROCEDURE — 99232 SBSQ HOSP IP/OBS MODERATE 35: CPT | Performed by: STUDENT IN AN ORGANIZED HEALTH CARE EDUCATION/TRAINING PROGRAM

## 2020-11-04 RX ORDER — DIPHENHYDRAMINE HCL 25 MG
50 CAPSULE ORAL
Status: DISCONTINUED | OUTPATIENT
Start: 2020-11-04 | End: 2020-11-18 | Stop reason: HOSPADM

## 2020-11-04 RX ADMIN — QUETIAPINE FUMARATE 100 MG: 100 TABLET ORAL at 17:34

## 2020-11-04 RX ADMIN — CARBIDOPA AND LEVODOPA 7.5 MG: 50; 200 TABLET, EXTENDED RELEASE ORAL at 17:34

## 2020-11-04 RX ADMIN — FOLIC ACID 1 MG: 1 TABLET ORAL at 08:09

## 2020-11-04 RX ADMIN — NICOTINE 1 PATCH: 21 PATCH, EXTENDED RELEASE TRANSDERMAL at 08:22

## 2020-11-04 RX ADMIN — QUETIAPINE FUMARATE 200 MG: 100 TABLET ORAL at 22:15

## 2020-11-04 RX ADMIN — PANTOPRAZOLE SODIUM 40 MG: 40 TABLET, DELAYED RELEASE ORAL at 22:15

## 2020-11-04 RX ADMIN — MULTIPLE VITAMINS W/ MINERALS TAB 1 TABLET: TAB at 08:09

## 2020-11-04 RX ADMIN — MIRTAZAPINE 15 MG: 15 TABLET, FILM COATED ORAL at 22:15

## 2020-11-04 RX ADMIN — PANTOPRAZOLE SODIUM 40 MG: 40 TABLET, DELAYED RELEASE ORAL at 08:08

## 2020-11-04 RX ADMIN — VORTIOXETINE 10 MG: 10 TABLET, FILM COATED ORAL at 08:05

## 2020-11-04 RX ADMIN — LIDOCAINE 1 PATCH: 560 PATCH PERCUTANEOUS; TOPICAL; TRANSDERMAL at 22:12

## 2020-11-04 RX ADMIN — TAMSULOSIN HYDROCHLORIDE 0.4 MG: 0.4 CAPSULE ORAL at 08:07

## 2020-11-04 RX ADMIN — MAGNESIUM OXIDE 400 MG: 400 TABLET ORAL at 08:38

## 2020-11-04 RX ADMIN — QUETIAPINE FUMARATE 100 MG: 100 TABLET ORAL at 08:08

## 2020-11-04 ASSESSMENT — ACTIVITIES OF DAILY LIVING (ADL)
ADLS_ACUITY_SCORE: 11

## 2020-11-04 NOTE — PROGRESS NOTES
[facility]    Medicine Progress Note - Hospitalist Service       Date of Admission:  10/14/2020  Date of Service: 11/04/2020    Assessment & Plan        Jim Richard is a 65 year old male was admitted on 10/14/2020 with PMH use disorder, cirrhosis, depression, recent hospitalization for intentional overdose, multiple hospitalization for intoxication who is being admitted again on 10/14/2020, due to continued drinking and suicidal ideation, stating that he would take all of his medications at once.  Recently hospitalized from 10/6 - 10/11 for alcohol intoxication with metabolic acidosis and C. difficile.  After discharge he states he stopped taking all of his medications and has been drinking because he does not care whether he lives or dies. Currently on a hold pending court hearing for commitment.      Depression, anxiety, suicidal ideation   Acute alcohol intoxication  Severe alcohol use disorder - multiple recent admission for intoxication at risk of life-threateningillness/death  Patient has been through treatment multiple times in the past. He states he stating drinking again right after discharge. BAL 0.36 at admission.    - CIWA stopped  - Psychiatry was consulted-> has initiated a commitment process due to his recidivism, inability to maintain sobriety, multiple admissions secondary to alcohol-related issues and expressing suicidal ideation while intoxicated.    - Psych saw on 11/2 and adjusted seroquel, remeron.   - Psych discontinued hydroxyzine  - Court appearance is currently scheduled for next week      Alcoholic cirrhosis with history of esophageal varices and ascites   History of GI bleed.  Grade 1 esophageal varices with portal gastropathy. He c/o diffuse abdominal discomfort and has obvious fluid wave on exam with diffuse mild tenderness on exam. Last paracentesis on 10/9 was negative for SBP and patient currently refusing paracentesis stating pain no different now  than prior to that procedure. S/p paracentesis 10/19.   - Paracentesis today  - Continue PPI   - PTA Atenolol stopped  - Nadolol started 10/29/2020.  Hold parameters for heart rate less than 55.  - GI following and appreciate their recommendations.  Stopped diuretics     Bradycardia  Suspect due to initiation of Nadolol while also receiving Atenolol   - Hold parameters for Nadolol in place for heart rate less than 55  - Atenolol has been discontinued   - Continue to monitor     Mild hyponatremia  Sodium stable in the low 130s  - Continue to monitor      Lactic acidosis secondary to above  Hemodynamically stable with low suspicion for infection / sepsis.  Likely related to liver disease      C. difficile colitis  D iagnosed during his last admission CT A/P with circumferential wall thickening of rectum suggesting distal colitis/proctitis. procalcitonin low at 0.07. WBC 7.7. Afebrile.  Not taking vancomycin and currently having 2-3 loose stools a day on admission.  - Finished a course of oral vancomycin 125 mg every 6 hours    NGUYEN on CKD stage III  Cr 1.63 on admission , Baseline creatinine 1.4-1.9.  Cr had bumped up to 2.1 over the past week, likely due to diuretics  - Continue to monitor  - Diuretics stopped      Chronic anemia, nl MCV, stable  Hemoglobins initially in the 10s.  Have drifted down slightly to 8-9     BPH  - Continue flomax     JANIS,   - home CPAP ordered     Malnutrition:    - Level of malnutrition: Non-Severe   - Based on: mild (or greater) subcutaneous fat loss, mild (or greater) muscle loss         Diet: Snacks/Supplements Adult: Other; Chocolate Shake + 2 pkts benepro w/ lunch and dinner (RD); With Meals  Regular Diet Adult    DVT Prophylaxis: Pneumatic Compression Devices  Leger Catheter: not present  Code Status: Full Code           Disposition Plan   Expected discharge: TBD, undergoing court evaluation for commitment  Entered: Chivo Murcia MD 11/04/2020, 5:22 PM       The patient's care was  discussed with the Bedside Nurse, Care Coordinator/ and Patient.    Chivo Murcia MD  Hospitalist Service  [facility]  Contact information available via [facility] Paging/Directory    ______________________________________________________________________    Interval History      Patient seen and examined. Some bradycardia today.  Otherwise no acute events over night.  No fevers or chills.  No pain at this time.  No difficulty breathing. Abdomen better today after paracentesis yesterday.     Data reviewed today: I reviewed all medications, new labs and imaging results over the last 24 hours. I personally reviewed no images or EKG's today.    Physical Exam   Vital Signs: Temp: 98.7  F (37.1  C) Temp src: Oral BP: 132/58 Pulse: 52   Resp: 18 SpO2: 96 % O2 Device: None (Room air)    Weight: 186 lbs 1.6 oz     General Appearance: Ambulating without any difficulty.  NAD   Respiratory: Clear to auscultation.  No respiratory distress  Cardiovascular: RRR.  No obvious murmurs  GI: Bowel sounds present.  Non-tender.  Abdomen is mildly distended but soft.  Skin: No rashes.  No cyanosis  Other: No edema.  No calf tenderness    Psych: Calm and pleasant, oriented x3    Data   Recent Labs   Lab 11/04/20  1138 11/03/20  1021 11/02/20  0933 10/29/20  1033 10/29/20  1033 10/29/20  0938   INR  --   --   --   --  1.31*  --    * 130* 130*   < >  --  130*  130*   POTASSIUM 4.2 3.9 4.0   < >  --  4.2  4.2   CHLORIDE 103 101 100   < >  --  101  101   CO2 25 24 25   < >  --  22  22   BUN 32* 32* 34*   < >  --  33*  34*   CR 2.10* 1.85* 1.99*   < >  --  2.11*  2.07*   ANIONGAP 4 5 5   < >  --  7  7   KEV 8.5 8.4* 8.5   < >  --  8.1*  8.1*   GLC 77 130* 115*   < >  --  124*  125*   ALBUMIN  --  2.8*  --   --   --  2.8*   PROTTOTAL  --   --   --   --   --  6.3*   BILITOTAL  --   --   --   --   --  0.3   ALKPHOS  --   --   --   --   --  173*   ALT  --   --   --   --   --  20   AST  --   --   --    --   --  22    < > = values in this interval not displayed.     No results found for this or any previous visit (from the past 24 hour(s)).

## 2020-11-04 NOTE — PROGRESS NOTES
SW:  Patient has a scheduled Examination tomorrow, 11/5,  at 1430 over the phone with [facility].  The phone number is [phone] and the access code is [email or web address].  The preliminary hearing follows at 1530.  The phone number is [phone] and the access code is [email or web address]. Writer will assist patient in making the phone calls.

## 2020-11-04 NOTE — PLAN OF CARE
DATE & TIME: 11/3/20 4918-4371  Cognitive Concerns/ Orientation : A&Ox4  BEHAVIOR & AGGRESSION TOOL COLOR: Green  CIWA SCORE: NA  ABNL VS/O2:  Bradycardic, other VSS on RA.   MOBILITY: Ind, walks in halls.   PAIN MANAGMENT: Denies  DIET: Regular  BOWEL/BLADDER: Up to BR  ABNL LAB/BG: Na 130; Creat 1.85  DRAIN/DEVICES: No IV access   TELEMETRY RHYTHM: NA  SKIN: Bruised, scabs  TESTS/PROCEDURES: Paracentesis yesterday.  D/C DAY/GOALS/PLACE: Pending, undergoing court eval for commitment.   OTHER IMPORTANT INFO: Nicotine patch to L arm. Lidocaine patch to L hip. Pt on court hold, court date scheduled for next week. Enteric precautions maintained. GI and Psych following.

## 2020-11-04 NOTE — PLAN OF CARE
DATE & TIME: 11/4/20 7458-8034    Cognitive Concerns/ Orientation : A&Ox4    BEHAVIOR & AGGRESSION TOOL COLOR: Green    CIWA SCORE: NA    ABNL VS/O2:  Bradycardic, most recent HR 44, /66, other VSS on RA.     MOBILITY: Ind, walks in halls.     PAIN MANAGMENT: Denies    DIET: Regular    Patient verbalized that MD told him he was on 2000mL fluid restriction, writer can't find in the chart    BOWEL/BLADDER: Up to BR    ABNL LAB/BG: Na 132; Creat 2.10, Urea Nitrogen 32    DRAIN/DEVICES: No IV access, MD Aware    TELEMETRY RHYTHM: NA    SKIN: Bruised, scabs    TESTS/PROCEDURES:     D/C DAY/GOALS/PLACE:   Pending, undergoing court eval for commitment.     OTHER IMPORTANT INFO: Nicotine patch to R arm. Pt on court hold, court date scheduled for next week. Enteric precautions maintained. GI and Psych following.    Seroquel recently changed to different scheduled doses, patient is adapting    In good spirits today.    Held scheduled midodrine for HTN and nadolol for Bradycardia

## 2020-11-04 NOTE — PLAN OF CARE
DATE & TIME: 11/3/20 8247-0520  Cognitive Concerns/ Orientation : A&Ox4  BEHAVIOR & AGGRESSION TOOL COLOR: Green  CIWA SCORE: NA  ABNL VS/O2:  Bradycardic, other VSS on RA.   MOBILITY: Ind, walks in halls.   PAIN MANAGMENT: Denies  DIET: Regular  BOWEL/BLADDER: Up to BR  ABNL LAB/BG: Na 130; Creat 1.85; GFR 37, 43  DRAIN/DEVICES: No PIV   TELEMETRY RHYTHM: NA  SKIN: Bruised, scabs  TESTS/PROCEDURES: Paracentesis this afternoon, took 1500 ml off.   D/C DAY/GOALS/PLACE: Pending, undergoing court eval for commitment.   OTHER IMPORTANT INFO: Nicotine patch to L arm. Pt on court hold., court date scheduled for next week. Enteric precautions maintained. GI and Psych following.

## 2020-11-05 LAB
ANION GAP SERPL CALCULATED.3IONS-SCNC: 6 MMOL/L (ref 3–14)
BUN SERPL-MCNC: 35 MG/DL (ref 7–30)
CALCIUM SERPL-MCNC: 8.4 MG/DL (ref 8.5–10.1)
CHLORIDE SERPL-SCNC: 103 MMOL/L (ref 94–109)
CO2 SERPL-SCNC: 23 MMOL/L (ref 20–32)
CREAT SERPL-MCNC: 1.95 MG/DL (ref 0.66–1.25)
GFR SERPL CREATININE-BSD FRML MDRD: 35 ML/MIN/{1.73_M2}
GLUCOSE SERPL-MCNC: 81 MG/DL (ref 70–99)
POTASSIUM SERPL-SCNC: 4.3 MMOL/L (ref 3.4–5.3)
SODIUM SERPL-SCNC: 132 MMOL/L (ref 133–144)

## 2020-11-05 PROCEDURE — 36415 COLL VENOUS BLD VENIPUNCTURE: CPT | Performed by: INTERNAL MEDICINE

## 2020-11-05 PROCEDURE — 250N000013 HC RX MED GY IP 250 OP 250 PS 637: Performed by: INTERNAL MEDICINE

## 2020-11-05 PROCEDURE — 120N000001 HC R&B MED SURG/OB

## 2020-11-05 PROCEDURE — 99232 SBSQ HOSP IP/OBS MODERATE 35: CPT | Performed by: STUDENT IN AN ORGANIZED HEALTH CARE EDUCATION/TRAINING PROGRAM

## 2020-11-05 PROCEDURE — 250N000013 HC RX MED GY IP 250 OP 250 PS 637: Performed by: STUDENT IN AN ORGANIZED HEALTH CARE EDUCATION/TRAINING PROGRAM

## 2020-11-05 PROCEDURE — 250N000013 HC RX MED GY IP 250 OP 250 PS 637: Performed by: PSYCHIATRY & NEUROLOGY

## 2020-11-05 PROCEDURE — 80048 BASIC METABOLIC PNL TOTAL CA: CPT | Performed by: INTERNAL MEDICINE

## 2020-11-05 RX ORDER — HYDROXYZINE HYDROCHLORIDE 25 MG/1
50 TABLET, FILM COATED ORAL
Status: COMPLETED | OUTPATIENT
Start: 2020-11-05 | End: 2020-11-05

## 2020-11-05 RX ADMIN — LIDOCAINE 1 PATCH: 560 PATCH PERCUTANEOUS; TOPICAL; TRANSDERMAL at 20:17

## 2020-11-05 RX ADMIN — NICOTINE 1 PATCH: 21 PATCH, EXTENDED RELEASE TRANSDERMAL at 09:18

## 2020-11-05 RX ADMIN — QUETIAPINE FUMARATE 200 MG: 100 TABLET ORAL at 21:48

## 2020-11-05 RX ADMIN — CARBIDOPA AND LEVODOPA 7.5 MG: 50; 200 TABLET, EXTENDED RELEASE ORAL at 20:18

## 2020-11-05 RX ADMIN — MULTIPLE VITAMINS W/ MINERALS TAB 1 TABLET: TAB at 09:10

## 2020-11-05 RX ADMIN — PANTOPRAZOLE SODIUM 40 MG: 40 TABLET, DELAYED RELEASE ORAL at 20:19

## 2020-11-05 RX ADMIN — QUETIAPINE FUMARATE 100 MG: 100 TABLET ORAL at 09:10

## 2020-11-05 RX ADMIN — FOLIC ACID 1 MG: 1 TABLET ORAL at 09:10

## 2020-11-05 RX ADMIN — HYDROXYZINE HYDROCHLORIDE 50 MG: 25 TABLET, FILM COATED ORAL at 21:51

## 2020-11-05 RX ADMIN — QUETIAPINE FUMARATE 100 MG: 100 TABLET ORAL at 16:52

## 2020-11-05 RX ADMIN — VORTIOXETINE 10 MG: 10 TABLET, FILM COATED ORAL at 09:10

## 2020-11-05 RX ADMIN — TAMSULOSIN HYDROCHLORIDE 0.4 MG: 0.4 CAPSULE ORAL at 09:10

## 2020-11-05 RX ADMIN — PANTOPRAZOLE SODIUM 40 MG: 40 TABLET, DELAYED RELEASE ORAL at 09:10

## 2020-11-05 RX ADMIN — MIRTAZAPINE 15 MG: 15 TABLET, FILM COATED ORAL at 21:48

## 2020-11-05 RX ADMIN — MAGNESIUM OXIDE 400 MG: 400 TABLET ORAL at 09:10

## 2020-11-05 ASSESSMENT — ACTIVITIES OF DAILY LIVING (ADL)
ADLS_ACUITY_SCORE: 11

## 2020-11-05 NOTE — PLAN OF CARE
DATE & TIME: 11/4/2020 9477-1405  Cognitive Concerns/ Orientation : A&Ox4  BEHAVIOR & AGGRESSION TOOL COLOR: Green, calm/cooperative  CIWA SCORE: NA  ABNL VS/O2:  Bradycardic at times  other VSS on RA.   MOBILITY: Ind, walks in halls  PAIN MANAGMENT: Denies  DIET: Regular,good po  BOWEL/BLADDER: continent, up to BR  ABNL LAB/BG: Na 132; Creat 2.10  DRAIN/DEVICES: No IV access   TELEMETRY RHYTHM: NA  SKIN: Bruised, scabs  TESTS/PROCEDURES: Paracentesis 11/3, site D/I  D/C DAY/GOALS/PLACE: Pending, undergoing court eval for commitment.   OTHER IMPORTANT INFO: Nicotine patch to R arm. Lidocaine patch on L hip. Pt on court hold, court date scheduled for next week.  Plan for phone interview with [facility] today at 1430. Enteric precautions maintained. GI and Psych following.

## 2020-11-05 NOTE — PLAN OF CARE
DATE & TIME: 11/3/20 3-11p  Cognitive Concerns/ Orientation : A&Ox4  BEHAVIOR & AGGRESSION TOOL COLOR: Green, calm/cooperative  CIWA SCORE: NA  ABNL VS/O2:  Bradycardic at times  other VSS on RA.   MOBILITY: Ind, walks in halls  PAIN MANAGMENT: Denies  DIET: Regular,good po  BOWEL/BLADDER: continent, up to BR  ABNL LAB/BG: Na 132; Creat 2.10  DRAIN/DEVICES: No IV access   TELEMETRY RHYTHM: NA  SKIN: Bruised, scabs  TESTS/PROCEDURES: Paracentesis 11/3, site D/I  D/C DAY/GOALS/PLACE: Pending, undergoing court eval for commitment.   OTHER IMPORTANT INFO: Nicotine patch to R arm. Lidocaine patch on, pt on court hold, court date scheduled for next week. Enteric precautions maintained. GI and Psych following.

## 2020-11-05 NOTE — PLAN OF CARE
DATE & TIME: 11/5/2020 7434-0937  Cognitive Concerns/ Orientation : A&Ox4  BEHAVIOR & AGGRESSION TOOL COLOR: Green, calm/cooperative  CIWA SCORE: NA  ABNL VS/O2:  Bradycardic and hypertensive VSS on RA. Held midodrine and nadolol   MOBILITY: Ind, walks in halls  PAIN MANAGMENT: Denies  DIET: Regular  BOWEL/BLADDER: continent, up to BR  ABNL LAB/BG: Na 132; Creat 1.95; BUN 35; Calcium 8.4;   DRAIN/DEVICES: No IV access   TELEMETRY RHYTHM: NA  SKIN: Bruised, scabs  TESTS/PROCEDURES:   D/C DAY/GOALS/PLACE: Pending, undergoing preliminary hearing for commitment from 8409-0587 today.    OTHER IMPORTANT INFO: Nicotine patch to L arm.  Pt on court hold, court date scheduled for next week.   Moving to room 603-2    Enteric precautions discontinued. GI and Psych following.

## 2020-11-05 NOTE — PROGRESS NOTES
[facility]    Medicine Progress Note - Hospitalist Service       Date of Admission:  10/14/2020  Date of Service: 11/05/2020    Assessment & Plan        Jim Richard is a 65 year old male was admitted on 10/14/2020 with PMH use disorder, cirrhosis, depression, recent hospitalization for intentional overdose, multiple hospitalization for intoxication who is being admitted again on 10/14/2020, due to continued drinking and suicidal ideation, stating that he would take all of his medications at once.  Recently hospitalized from 10/6 - 10/11 for alcohol intoxication with metabolic acidosis and C. difficile.  After discharge he states he stopped taking all of his medications and has been drinking because he does not care whether he lives or dies. Currently on a hold pending court hearing for commitment.      Depression, anxiety, suicidal ideation   Acute alcohol intoxication  Severe alcohol use disorder - multiple recent admission for intoxication at risk of life-threateningillness/death  Patient has been through treatment multiple times in the past. He states he stating drinking again right after discharge. BAL 0.36 at admission.    - CIWA stopped  - Psychiatry was consulted-> has initiated a commitment process due to his recidivism, inability to maintain sobriety, multiple admissions secondary to alcohol-related issues and expressing suicidal ideation while intoxicated.    - Psych saw on 11/2 and adjusted seroquel, remeron.   - Psych discontinued hydroxyzine, follow up consult placed for 11/06.     Alcoholic cirrhosis with history of esophageal varices and ascites   History of GI bleed.  Grade 1 esophageal varices with portal gastropathy. He c/o diffuse abdominal discomfort and has obvious fluid wave on exam with diffuse mild tenderness on exam. Last paracentesis on 10/9 was negative for SBP and patient currently refusing paracentesis stating pain no different now than prior to that  procedure. S/p paracentesis 10/19.   - Paracentesis today  - Continue PPI   - PTA Atenolol stopped  - Nadolol started 10/29/2020.  Hold parameters for heart rate less than 55.  - GI following and appreciate their recommendations.  Stopped diuretics     Bradycardia  Suspect due to initiation of Nadolol while also receiving Atenolol   - Hold parameters for Nadolol in place for heart rate less than 55  - Atenolol has been discontinued   - Continue to monitor     Mild hyponatremia  Sodium stable in the low 130s  - Continue to monitor      Lactic acidosis secondary to above  Hemodynamically stable with low suspicion for infection / sepsis.  Likely related to liver disease      C. difficile colitis  D iagnosed during his last admission CT A/P with circumferential wall thickening of rectum suggesting distal colitis/proctitis. procalcitonin low at 0.07. WBC 7.7. Afebrile.  Not taking vancomycin and currently having 2-3 loose stools a day on admission.  - Finished a course of oral vancomycin 125 mg every 6 hours    NGUYEN on CKD stage III  Cr 1.63 on admission , Baseline creatinine 1.4-1.9.  Cr had bumped up to 2.1 over the past week, likely due to diuretics  - Continue to monitor  - Diuretics stopped      Chronic anemia, nl MCV, stable  Hemoglobins initially in the 10s.  Have drifted down slightly to 8-9     BPH  - Continue flomax     JANIS,   - home CPAP ordered     Malnutrition:    - Level of malnutrition: Non-Severe   - Based on: mild (or greater) subcutaneous fat loss, mild (or greater) muscle loss         Diet: Snacks/Supplements Adult: Other; Chocolate Shake + 2 pkts benepro w/ lunch and dinner (RD); With Meals  Regular Diet Adult    DVT Prophylaxis: Pneumatic Compression Devices  Leger Catheter: not present  Code Status: Full Code           Disposition Plan   Expected discharge: TBD, undergoing court evaluation for commitment  Entered: Chivo Murcia MD 11/05/2020, 4:42 PM       The patient's care was discussed with the  Bedside Nurse, Care Coordinator/ and Patient.    Chivo Murcia MD  Hospitalist Service  [facility]  Contact information available via [facility] Paging/Directory    ______________________________________________________________________    Interval History      Patient seen and examined.  Otherwise no acute events over night.  No fevers or chills.  No pain at this time.  No difficulty breathing. Abdomen with no significant pain or distension today. Reports he is having uncontrolled anxiety/depression/insomnia.     Data reviewed today: I reviewed all medications, new labs and imaging results over the last 24 hours. I personally reviewed no images or EKG's today.    Physical Exam   Vital Signs: Temp: 98.6  F (37  C) Temp src: Oral BP: (!) 148/71 Pulse: (!) 47   Resp: 16 SpO2: 99 % O2 Device: None (Room air)    Weight: 183 lbs 13.82 oz     General Appearance: Ambulating without any difficulty.  NAD   Respiratory: Clear to auscultation.  No respiratory distress  Cardiovascular: RRR.  No obvious murmurs  GI: Bowel sounds present.  Non-tender.  Abdomen is mildly distended but soft.  Skin: No rashes.  No cyanosis  Other: No edema.  No calf tenderness    Psych: Calm and pleasant, oriented x3    Data   Recent Labs   Lab 11/05/20  1213 11/04/20  1138 11/03/20  1021   * 132* 130*   POTASSIUM 4.3 4.2 3.9   CHLORIDE 103 103 101   CO2 23 25 24   BUN 35* 32* 32*   CR 1.95* 2.10* 1.85*   ANIONGAP 6 4 5   KEV 8.4* 8.5 8.4*   GLC 81 77 130*   ALBUMIN  --   --  2.8*     No results found for this or any previous visit (from the past 24 hour(s)).

## 2020-11-05 NOTE — PROGRESS NOTES
CLINICAL NUTRITION SERVICES - REASSESSMENT NOTE    Malnutrition: (10/16)   % Weight Loss:  Weight loss does not meet criteria for malnutrition   % Intake:  Decreased intake does not meet criteria for malnutrition - pt states was eating meals TID at last facility   Subcutaneous Fat Loss:  Orbital region mild depletion and Upper arm region mild depletion  Muscle Loss:  Temporal region mild depletion, Clavicle bone region mild depletion and Dorsal hand region mild depletion  Fluid Retention:  None noted     Malnutrition Diagnosis: Non-Severe malnutrition  In Context of:  Chronic illness or disease  Environmental or social circumstances     EVALUATION OF PROGRESS TOWARD GOALS   Diet: regular (upgraded on 11/3)  Milkshake + 2 pkts of beneprotein w/ meals     Intake/Tolerance:  Continues to consume 100% of meals. Noted that since diet liberalization pt has been consuming larger amounts of sodium (4080 mg ordered in 3 meals yesterday)     ASSESSED NUTRITION NEEDS:  Dosing Weight 70.7 kg - adjusted   Estimated Energy Needs: 6778-0867 kcals (25-30 Kcal/Kg)  Justification: maintenance  Estimated Protein Needs:  grams protein (1.2-1.5 g pro/Kg)  Justification: preservation of lean body mass  Estimated Fluid Needs: 1 mL/kcal  Justification: maintenance    NEW FINDINGS:   - Phone call w/ [facility] today 1430  - 11/04 - Paracentesis 1.8L out    Previous Goals:   Intake of >75% meals TID.   Evaluation: Met    Previous Nutrition Diagnosis:   No nutrition diagnosis identified at this time   Evaluation: No change    CURRENT NUTRITION DIAGNOSIS  No nutrition diagnosis identified at this time     INTERVENTIONS  Recommendations / Nutrition Prescription  Diet per MD  Supplements per patient request     Implementation  None new    Goals  Intake of >/=75% meals TID.     MONITORING AND EVALUATION:  RD will sign off. Please consult should needs arise.     Summer Isaac RD, LD  [facility] [place], [address], [facility], [facility]   Pager:  [phone]  Weekend Pager: [phone]

## 2020-11-06 LAB
ANION GAP SERPL CALCULATED.3IONS-SCNC: 2 MMOL/L (ref 3–14)
BUN SERPL-MCNC: 37 MG/DL (ref 7–30)
CALCIUM SERPL-MCNC: 8.6 MG/DL (ref 8.5–10.1)
CHLORIDE SERPL-SCNC: 107 MMOL/L (ref 94–109)
CO2 SERPL-SCNC: 25 MMOL/L (ref 20–32)
CREAT SERPL-MCNC: 2.04 MG/DL (ref 0.66–1.25)
GFR SERPL CREATININE-BSD FRML MDRD: 33 ML/MIN/{1.73_M2}
GLUCOSE SERPL-MCNC: 97 MG/DL (ref 70–99)
POTASSIUM SERPL-SCNC: 4.5 MMOL/L (ref 3.4–5.3)
SODIUM SERPL-SCNC: 134 MMOL/L (ref 133–144)

## 2020-11-06 PROCEDURE — 250N000013 HC RX MED GY IP 250 OP 250 PS 637: Performed by: STUDENT IN AN ORGANIZED HEALTH CARE EDUCATION/TRAINING PROGRAM

## 2020-11-06 PROCEDURE — 250N000013 HC RX MED GY IP 250 OP 250 PS 637: Performed by: INTERNAL MEDICINE

## 2020-11-06 PROCEDURE — 36415 COLL VENOUS BLD VENIPUNCTURE: CPT | Performed by: INTERNAL MEDICINE

## 2020-11-06 PROCEDURE — 250N000013 HC RX MED GY IP 250 OP 250 PS 637: Performed by: PSYCHIATRY & NEUROLOGY

## 2020-11-06 PROCEDURE — 99232 SBSQ HOSP IP/OBS MODERATE 35: CPT | Performed by: STUDENT IN AN ORGANIZED HEALTH CARE EDUCATION/TRAINING PROGRAM

## 2020-11-06 PROCEDURE — 99232 SBSQ HOSP IP/OBS MODERATE 35: CPT | Mod: 95 | Performed by: PSYCHIATRY & NEUROLOGY

## 2020-11-06 PROCEDURE — 80048 BASIC METABOLIC PNL TOTAL CA: CPT | Performed by: INTERNAL MEDICINE

## 2020-11-06 PROCEDURE — 120N000001 HC R&B MED SURG/OB

## 2020-11-06 PROCEDURE — 250N000013 HC RX MED GY IP 250 OP 250 PS 637: Performed by: HOSPITALIST

## 2020-11-06 RX ORDER — HYDROXYZINE HYDROCHLORIDE 25 MG/1
50 TABLET, FILM COATED ORAL
Status: DISCONTINUED | OUTPATIENT
Start: 2020-11-06 | End: 2020-11-18 | Stop reason: HOSPADM

## 2020-11-06 RX ADMIN — PANTOPRAZOLE SODIUM 40 MG: 40 TABLET, DELAYED RELEASE ORAL at 21:56

## 2020-11-06 RX ADMIN — CARBIDOPA AND LEVODOPA 7.5 MG: 50; 200 TABLET, EXTENDED RELEASE ORAL at 13:49

## 2020-11-06 RX ADMIN — FLUTICASONE FUROATE AND VILANTEROL TRIFENATATE 1 PUFF: 200; 25 POWDER RESPIRATORY (INHALATION) at 09:10

## 2020-11-06 RX ADMIN — TAMSULOSIN HYDROCHLORIDE 0.4 MG: 0.4 CAPSULE ORAL at 09:11

## 2020-11-06 RX ADMIN — QUETIAPINE FUMARATE 200 MG: 100 TABLET ORAL at 21:56

## 2020-11-06 RX ADMIN — MIRTAZAPINE 15 MG: 15 TABLET, FILM COATED ORAL at 21:56

## 2020-11-06 RX ADMIN — CARBIDOPA AND LEVODOPA 7.5 MG: 50; 200 TABLET, EXTENDED RELEASE ORAL at 17:14

## 2020-11-06 RX ADMIN — HYDROXYZINE HYDROCHLORIDE 50 MG: 25 TABLET, FILM COATED ORAL at 21:58

## 2020-11-06 RX ADMIN — FOLIC ACID 1 MG: 1 TABLET ORAL at 09:11

## 2020-11-06 RX ADMIN — CARBIDOPA AND LEVODOPA 7.5 MG: 50; 200 TABLET, EXTENDED RELEASE ORAL at 09:10

## 2020-11-06 RX ADMIN — PANTOPRAZOLE SODIUM 40 MG: 40 TABLET, DELAYED RELEASE ORAL at 09:11

## 2020-11-06 RX ADMIN — NADOLOL 40 MG: 40 TABLET ORAL at 09:11

## 2020-11-06 RX ADMIN — MAGNESIUM OXIDE 400 MG: 400 TABLET ORAL at 09:10

## 2020-11-06 RX ADMIN — MULTIPLE VITAMINS W/ MINERALS TAB 1 TABLET: TAB at 09:11

## 2020-11-06 RX ADMIN — QUETIAPINE FUMARATE 100 MG: 100 TABLET ORAL at 17:14

## 2020-11-06 RX ADMIN — NICOTINE 1 PATCH: 21 PATCH, EXTENDED RELEASE TRANSDERMAL at 09:11

## 2020-11-06 RX ADMIN — QUETIAPINE FUMARATE 100 MG: 100 TABLET ORAL at 09:11

## 2020-11-06 RX ADMIN — VORTIOXETINE 10 MG: 10 TABLET, FILM COATED ORAL at 09:10

## 2020-11-06 ASSESSMENT — ACTIVITIES OF DAILY LIVING (ADL)
ADLS_ACUITY_SCORE: 11

## 2020-11-06 NOTE — PROGRESS NOTES
Care Management Follow Up    Length of Stay (days): 22    Expected Discharge Date: (Pending court hearing)     Concerns to be Addressed:       Patient plan of care discussed at interdisciplinary rounds: Yes    Anticipated Discharge Disposition:       Anticipated Discharge Services:    Anticipated Discharge DME:      Patient/family educated on Medicare website which has current facility and service quality ratings:    Education Provided on the Discharge Plan:    Patient/Family in Agreement with the Plan:      Referrals Placed by CM/SW:    Private pay costs discussed: Not applicable    Additional Information:  Patient participated in the Examination and preliminary hearing today.  [facility] Assistant  called and stated patient's hearing will be help November 10 via telephone as was the case today.    The [facility] CM assigned to make  a treatment recommendation has recommended  [facility], [place]

## 2020-11-06 NOTE — PLAN OF CARE
DATE & TIME: 11/5/2020 7867-3947  Cognitive Concerns/ Orientation : A&O x4, pleasant. Upset/anxious at times due to preliminary hearing on 11/5  BEHAVIOR & AGGRESSION TOOL COLOR: Green  CIWA SCORE: NA  ABNL VS/O2:  VSS on RA ex mandi  MOBILITY: Ind, walks in halls frequently when awake   PAIN MANAGMENT: Denies  DIET: Regular, good po  BOWEL/BLADDER: continent, up to BR  ABNL LAB/BG: Na 132; Creat 1.95  DRAIN/DEVICES: No IV access, MD aware   TELEMETRY RHYTHM: NA  SKIN: Bruised, scabs, BLE 1-2+ edema   TESTS/PROCEDURES: Paracentesis completed 11/3  D/C DAY/GOALS/PLACE: Pending, undergoing court eval for commitment. SW following  OTHER IMPORTANT INFO: R arm nicotine patch and L hip lidocaine patch in place. Pt on Court hold. Court Hearing 11/10. GI and Psych following.

## 2020-11-06 NOTE — PROGRESS NOTES
[facility]    Medicine Progress Note - Hospitalist Service       Date of Admission:  10/14/2020  Date of Service: 11/06/2020    Assessment & Plan        Jim Richard is a 65 year old male was admitted on 10/14/2020 with PMH use disorder, cirrhosis, depression, recent hospitalization for intentional overdose, multiple hospitalization for intoxication who is being admitted again on 10/14/2020, due to continued drinking and suicidal ideation, stating that he would take all of his medications at once.  Recently hospitalized from 10/6 - 10/11 for alcohol intoxication with metabolic acidosis and C. difficile.  After discharge he states he stopped taking all of his medications and has been drinking because he does not care whether he lives or dies. Currently on a hold pending court hearing for commitment.      Depression, anxiety, suicidal ideation   Acute alcohol intoxication  Severe alcohol use disorder - multiple recent admission for intoxication at risk of life-threateningillness/death  Patient has been through treatment multiple times in the past. He states he stating drinking again right after discharge. BAL 0.36 at admission.    - CIWA stopped  - Psychiatry was consulted-> has initiated a commitment process due to his recidivism, inability to maintain sobriety, multiple admissions secondary to alcohol-related issues and expressing suicidal ideation while intoxicated.    - Psych saw on 11/06 and adjusted seroquel, remeron.   - Psych discontinued hydroxyzine but still available as needed for sleep, follow up consult occurred 11/06, request follow up as needed      Alcoholic cirrhosis with history of esophageal varices and ascites   History of GI bleed.  Grade 1 esophageal varices with portal gastropathy. He c/o diffuse abdominal discomfort and has obvious fluid wave on exam with diffuse mild tenderness on exam. Last paracentesis on 10/9 was negative for SBP and patient currently  refusing paracentesis stating pain no different now than prior to that procedure. S/p paracentesis 10/19.   Plan:  - Continue PPI   - PTA Atenolol stopped  - Nadolol started 10/29/2020.  Hold parameters for heart rate less than 55.  - GI following and appreciate their recommendations.  Stopped diuretics     Bradycardia  Suspect due to initiation of Nadolol while also receiving Atenolol   - Hold parameters for Nadolol in place for heart rate less than 55  - Atenolol has been discontinued   - Continue to monitor     Mild hyponatremia  Sodium stable in the low 130s -> 134  - Continue to monitor      Lactic acidosis secondary to above  Hemodynamically stable with low suspicion for infection / sepsis.  Likely related to liver disease      C. difficile colitis  D iagnosed during his last admission CT A/P with circumferential wall thickening of rectum suggesting distal colitis/proctitis. procalcitonin low at 0.07. WBC 7.7. Afebrile.  Not taking vancomycin and currently having 2-3 loose stools a day on admission.  - Finished a course of oral vancomycin 125 mg every 6 hours    NGUYEN on CKD stage III  Cr 1.63 on admission , Baseline creatinine 1.4-1.9.  Cr had bumped up to 2.1 over the past week, likely due to diuretics  Plan:  - Continue to monitor  - Diuretics stopped      Chronic anemia, nl MCV, stable  Hemoglobins initially in the 10s.  Have drifted down slightly to 8-9     BPH  - Continue flomax     JANIS,   - home CPAP ordered     Malnutrition:    - Level of malnutrition: Non-Severe   - Based on: mild (or greater) subcutaneous fat loss, mild (or greater) muscle loss         Diet: Snacks/Supplements Adult: Other; Chocolate Shake + 2 pkts benepro w/ lunch and dinner (RD); With Meals  Regular Diet Adult    DVT Prophylaxis: Pneumatic Compression Devices  Leger Catheter: not present  Code Status: Full Code           Disposition Plan   Expected discharge: TBD, undergoing court evaluation for commitment  Entered: Chivo Murcia MD  11/06/2020, 4:27 PM       The patient's care was discussed with the Bedside Nurse, Care Coordinator/ and Patient.    Chivo Murcia MD  Hospitalist Service  [facility]  Contact information available via [facility] Paging/Directory    ______________________________________________________________________    Interval History      Patient seen and examined. No acute events over night.  No fevers or chills.  No pain at this time.  No difficulty breathing. Abdomen with no significant pain or distension today. Reports main complaint again is anxiety.     Data reviewed today: I reviewed all medications, new labs and imaging results over the last 24 hours. I personally reviewed no images or EKG's today.    Physical Exam   Vital Signs: Temp: 97.5  F (36.4  C) Temp src: Oral BP: (!) 149/68 Pulse: 56   Resp: 16 SpO2: 97 % O2 Device: None (Room air)    Weight: 183 lbs 13.82 oz     General Appearance: Ambulating without any difficulty.  NAD   Respiratory: Clear to auscultation.  No respiratory distress  Cardiovascular: RRR.  No obvious murmurs  GI: Bowel sounds present.  Non-tender.  Abdomen is mildly distended but soft.  Skin: No rashes.  No cyanosis  Other: No edema.  No calf tenderness    Psych: Calm and pleasant, oriented x3    Data   Recent Labs   Lab 11/06/20  1100 11/05/20  1213 11/04/20  1138 11/03/20  1021    132* 132* 130*   POTASSIUM 4.5 4.3 4.2 3.9   CHLORIDE 107 103 103 101   CO2 25 23 25 24   BUN 37* 35* 32* 32*   CR 2.04* 1.95* 2.10* 1.85*   ANIONGAP 2* 6 4 5   KEV 8.6 8.4* 8.5 8.4*   GLC 97 81 77 130*   ALBUMIN  --   --   --  2.8*     No results found for this or any previous visit (from the past 24 hour(s)).

## 2020-11-06 NOTE — PROGRESS NOTES
Care Management Follow Up    Length of Stay (days): 23    Expected Discharge Date: (possibly later in the week of 11/8)     Concerns to be Addressed: (patient would like to move into an [facility] in [place])  (The [facility] CM is recommending a commitment to [facility] )  Patient plan of care discussed at interdisciplinary rounds: Yes    Anticipated Discharge Disposition: ([facility] will provide)     Anticipated Discharge Services:    Anticipated Discharge DME:      Patient/family educated on Medicare website which has current facility and service quality ratings:    Education Provided on the Discharge Plan:    Patient/Family in Agreement with the Plan:      Referrals Placed by CM/SW:    Private pay costs discussed: Not applicable    Additional Information:  Patient has his hearing via phone with [facility] on 11-10 at 0900.Patient shares his plan is to move to an assisted living in [place]  He feels in the half-way he will have people around him which will decrease his desire to drink.  His [facility] CM Omayra Angela [phone] is aware of his plan.  She reports she is recommending the admission to [facility] in [place].  At [facility], patient is provided lodging, CD programming and medical oversite.  [facility] also has long term housing. Her response to patient's plan to move into [facility] is that he can work on the arrangements at [facility].  She notes patient will first need to sell his house before he can pay for the assisted living.   Omayra will call [facility] on Monday and ask if they received the faxed hospital medical record and determine if they will accept patient.  [facility] is aware patient has a Cdiff history.   Per Ms Angela,patient will need a Rule 25 assessment or update as the Rule 25 will approve funding for [facility].  Writer has asked Dr Murcia to enter a CD consult for patient.        Earline Mauricio [facility] CenterSW

## 2020-11-06 NOTE — PLAN OF CARE
Cognitive Concerns/ Orientation : A&O x4, pleasant.   BEHAVIOR & AGGRESSION TOOL COLOR: Green  CIWA SCORE: NA  ABNL VS/O2:  VSS on RA ex mandi  MOBILITY: Ind, walks in halls frequently when awake   PAIN MANAGMENT: Denies  DIET: Regular, good po  BOWEL/BLADDER: continent, up to BR  ABNL LAB/BG: Na 134; Creat 2.04  DRAIN/DEVICES: No IV access, MD aware   TELEMETRY RHYTHM: NA  SKIN: Bruised, scabs, BLE 1-2+ edema   TESTS/PROCEDURES: none pending  D/C DAY/GOALS/PLACE: Pending, undergoing court eval for commitment. SW following  OTHER IMPORTANT INFO: Pt on Court hold. Court Hearing 11/10. GI and Psych following.

## 2020-11-06 NOTE — PLAN OF CARE
DATE & TIME: 11/4/2020 3-11p  Cognitive Concerns/ Orientation : A&Ox4  BEHAVIOR & AGGRESSION TOOL COLOR: Green, calm/cooperative  CIWA SCORE: NA  ABNL VS/O2:  Bradycardic, other VSS on RA.   MOBILITY: Ind, walks in halls  PAIN MANAGMENT: Denies  DIET: Regular, good po  BOWEL/BLADDER: continent, up to BR  ABNL LAB/BG: Na 132; Creat 1.97  DRAIN/DEVICES: No IV access   TELEMETRY RHYTHM: NA  SKIN: Bruised, scabs  TESTS/PROCEDURES: Paracentesis 11/3, site D/I  D/C DAY/GOALS/PLACE: Pending, undergoing court eval for commitment.   OTHER IMPORTANT INFO: Nicotine patch to R arm, Lt hip Lidocaine patch on, pt on Court hold, had preliminary court hearing today, Court Hearing Nov 10th,  Atarax given at hs, Enteric precautions maintained. GI and Psych following.

## 2020-11-06 NOTE — CONSULTS
"[facility] Psychiatric Consult Progress Note    Interval History:   Pt seen, chart reviewed, case discussed with nursing staff and treating clinicians. Patient continues to await commitment process. Refer to  ongoing documentation for details in this regard. It appears he has a hearing arranged for 11/10/20 and the [facility] assigned to the patient has recommended [facility] (long-term residential tx). Patient last seen by Dr. Davis on 11/2, at which time patient was endorsing the stress of the commitment process has led to higher anxiety, worsened mood, and difficulty with sleep. Dr. Davis made some adjustments, consolidating Seroquel dosing to 100 mg twice a day and 200 mg before bed, lowered Remeron to 15 mg, continued Trintellix 10 mg, and discontinued hydroxyzine and trazodone in an effort to reduce polypharmacy.     On interview today patient tells me \"I feel great, the best I've felt in quite a while.\" He notes he has a 90 day stint of sobriety while in tx, and even ion comparison to that he feels like he's never felt as stable. Feels physically better as well. He did have questions about the commitment process again, telling me he doesn't understand how the doctors could make those recs when he never talked with anyone for more than an hour or so. I explained the rationale, which was borne from concerns of the potentially life threatening nature of his refractory relationship with alcohol overlaid with how it was further worsening his mental health. He expressed understanding.       Review of systems:   10 point Review of Systems completed by Eros Kearney DO, and is negative other than noted in the HPI     Medications:       folic acid  1 mg Oral Daily     lidocaine  1 patch Transdermal Q24h    And     lidocaine   Transdermal Q8H     magnesium oxide  400 mg Oral Daily     midodrine  7.5 mg Oral TID w/meals     mirtazapine  15 mg Oral At Bedtime     multivitamin " w/minerals  1 tablet Oral Daily     nadolol  40 mg Oral Daily     nicotine  1 patch Transdermal Daily     nicotine   Transdermal Q8H     pantoprazole  40 mg Oral BID     QUEtiapine  100 mg Oral BID w/meals     QUEtiapine  200 mg Oral At Bedtime     sodium chloride (PF)  3 mL Intracatheter Q8H     tamsulosin  0.4 mg Oral Daily     vortioxetine  10 mg Oral Daily     acetaminophen, bisacodyl, diphenhydrAMINE, fluticasone-vilanterol, lidocaine 4%, lidocaine (buffered or not buffered), naloxone, nicotine, ondansetron **OR** ondansetron, polyethylene glycol, senna-docusate **OR** senna-docusate, sodium chloride (PF)    Mental Status Examination:   Appearance:  awake, alert  Eye Contact:  good  Speech:  clear, coherent  Language:Normal  Psychomotor Behavior:  no evidence of tardive dyskinesia, dystonia, or tics  Mood:  good  Affect:  appropriate and in normal range  Thought Process:  logical, linear and goal oriented no loose associations  Thought Content:  no evidence of suicidal ideation or homicidal ideation and no evidence of psychotic thought  Oriented to:  time, person, and place  Attention Span and Concentration:  intact  Recent and Remote Memory:  intact  Fund of Knowledge: appropriate  Muscle Strength and Tone: normal  Gait and Station: Normal  Insight:  improving  Judgment:  improving        Labs/Vitals:     Recent Results (from the past 24 hour(s))   Basic metabolic panel    Collection Time: 11/05/20 12:13 PM   Result Value Ref Range    Sodium 132 (L) 133 - 144 mmol/L    Potassium 4.3 3.4 - 5.3 mmol/L    Chloride 103 94 - 109 mmol/L    Carbon Dioxide 23 20 - 32 mmol/L    Anion Gap 6 3 - 14 mmol/L    Glucose 81 70 - 99 mg/dL    Urea Nitrogen 35 (H) 7 - 30 mg/dL    Creatinine 1.95 (H) 0.66 - 1.25 mg/dL    GFR Estimate 35 (L) >60 mL/min/[1.73_m2]    GFR Estimate If Black 40 (L) >60 mL/min/[1.73_m2]    Calcium 8.4 (L) 8.5 - 10.1 mg/dL     B/P: 139/73, T: 98.2, P: 56, R: 16        Diagnoses:   1. Alcohol use  disorder, severe  2. Major depressive disorder, recurrent, severe      Recommendations:   1. Continue current medications without modification  2. Currently awaiting commitment process and placement ( notes for details; long-term residential at [facility] appears current discussion with Arti Leonard CM; next hearing 11/10/20)      Attestation:  Patient has been seen and evaluated by me,  Eros Kearney, DO    Visit/Communication Style   VIRTUAL (VIDEO) communication was used to evaluate Jim due to the COVID pandemic.    Jim consented to the use of video communication: yes  Video START time: 11:03 am, 11/6/2020  Video STOP time: 11:10 am, 11/6/2020   Patient's location: [facility]   Provider's location during the visit: Home

## 2020-11-07 LAB
ANION GAP SERPL CALCULATED.3IONS-SCNC: 5 MMOL/L (ref 3–14)
BUN SERPL-MCNC: 40 MG/DL (ref 7–30)
CALCIUM SERPL-MCNC: 8.5 MG/DL (ref 8.5–10.1)
CHLORIDE SERPL-SCNC: 110 MMOL/L (ref 94–109)
CO2 SERPL-SCNC: 24 MMOL/L (ref 20–32)
CREAT SERPL-MCNC: 2.05 MG/DL (ref 0.66–1.25)
GFR SERPL CREATININE-BSD FRML MDRD: 33 ML/MIN/{1.73_M2}
GLUCOSE SERPL-MCNC: 109 MG/DL (ref 70–99)
POTASSIUM SERPL-SCNC: 4.5 MMOL/L (ref 3.4–5.3)
SODIUM SERPL-SCNC: 139 MMOL/L (ref 133–144)

## 2020-11-07 PROCEDURE — 80048 BASIC METABOLIC PNL TOTAL CA: CPT | Performed by: INTERNAL MEDICINE

## 2020-11-07 PROCEDURE — 250N000013 HC RX MED GY IP 250 OP 250 PS 637: Performed by: STUDENT IN AN ORGANIZED HEALTH CARE EDUCATION/TRAINING PROGRAM

## 2020-11-07 PROCEDURE — 99231 SBSQ HOSP IP/OBS SF/LOW 25: CPT | Performed by: STUDENT IN AN ORGANIZED HEALTH CARE EDUCATION/TRAINING PROGRAM

## 2020-11-07 PROCEDURE — 120N000001 HC R&B MED SURG/OB

## 2020-11-07 PROCEDURE — 999N000216 HC STATISTIC ADULT CD FACE TO FACE-NO CHRG

## 2020-11-07 PROCEDURE — 250N000013 HC RX MED GY IP 250 OP 250 PS 637: Performed by: PSYCHIATRY & NEUROLOGY

## 2020-11-07 PROCEDURE — 250N000013 HC RX MED GY IP 250 OP 250 PS 637: Performed by: INTERNAL MEDICINE

## 2020-11-07 PROCEDURE — 36415 COLL VENOUS BLD VENIPUNCTURE: CPT | Performed by: INTERNAL MEDICINE

## 2020-11-07 PROCEDURE — 250N000013 HC RX MED GY IP 250 OP 250 PS 637: Performed by: HOSPITALIST

## 2020-11-07 RX ADMIN — NADOLOL 40 MG: 40 TABLET ORAL at 08:34

## 2020-11-07 RX ADMIN — TAMSULOSIN HYDROCHLORIDE 0.4 MG: 0.4 CAPSULE ORAL at 08:34

## 2020-11-07 RX ADMIN — MIRTAZAPINE 15 MG: 15 TABLET, FILM COATED ORAL at 21:18

## 2020-11-07 RX ADMIN — CARBIDOPA AND LEVODOPA 7.5 MG: 50; 200 TABLET, EXTENDED RELEASE ORAL at 08:34

## 2020-11-07 RX ADMIN — PANTOPRAZOLE SODIUM 40 MG: 40 TABLET, DELAYED RELEASE ORAL at 08:34

## 2020-11-07 RX ADMIN — VORTIOXETINE 10 MG: 10 TABLET, FILM COATED ORAL at 08:34

## 2020-11-07 RX ADMIN — QUETIAPINE FUMARATE 100 MG: 100 TABLET ORAL at 17:58

## 2020-11-07 RX ADMIN — PANTOPRAZOLE SODIUM 40 MG: 40 TABLET, DELAYED RELEASE ORAL at 21:18

## 2020-11-07 RX ADMIN — QUETIAPINE FUMARATE 100 MG: 100 TABLET ORAL at 08:34

## 2020-11-07 RX ADMIN — CARBIDOPA AND LEVODOPA 7.5 MG: 50; 200 TABLET, EXTENDED RELEASE ORAL at 12:52

## 2020-11-07 RX ADMIN — NICOTINE 1 PATCH: 21 PATCH, EXTENDED RELEASE TRANSDERMAL at 08:33

## 2020-11-07 RX ADMIN — QUETIAPINE FUMARATE 200 MG: 100 TABLET ORAL at 21:18

## 2020-11-07 RX ADMIN — HYDROXYZINE HYDROCHLORIDE 50 MG: 25 TABLET, FILM COATED ORAL at 21:18

## 2020-11-07 RX ADMIN — CARBIDOPA AND LEVODOPA 7.5 MG: 50; 200 TABLET, EXTENDED RELEASE ORAL at 17:58

## 2020-11-07 RX ADMIN — MULTIPLE VITAMINS W/ MINERALS TAB 1 TABLET: TAB at 08:34

## 2020-11-07 RX ADMIN — FOLIC ACID 1 MG: 1 TABLET ORAL at 08:34

## 2020-11-07 RX ADMIN — MAGNESIUM OXIDE 400 MG: 400 TABLET ORAL at 08:34

## 2020-11-07 ASSESSMENT — ACTIVITIES OF DAILY LIVING (ADL)
ADLS_ACUITY_SCORE: 11

## 2020-11-07 NOTE — PROGRESS NOTES
[facility]    Medicine Progress Note - Hospitalist Service       Date of Admission:  10/14/2020  Date of Service: 11/07/2020    Assessment & Plan        Jim Richard is a 65 year old male was admitted on 10/14/2020 with PMH use disorder, cirrhosis, depression, recent hospitalization for intentional overdose, multiple hospitalization for intoxication who is being admitted again on 10/14/2020, due to continued drinking and suicidal ideation, stating that he would take all of his medications at once.  Recently hospitalized from 10/6 - 10/11 for alcohol intoxication with metabolic acidosis and C. difficile.  After discharge he states he stopped taking all of his medications and has been drinking because he does not care whether he lives or dies. Currently on a hold pending court hearing for commitment.      Depression, anxiety, suicidal ideation   Acute alcohol intoxication  Severe alcohol use disorder - multiple recent admission for intoxication at risk of life-threateningillness/death  Patient has been through treatment multiple times in the past. He states he stating drinking again right after discharge. BAL 0.36 at admission.    - CIWA stopped  - Psychiatry was consulted-> has initiated a commitment process due to his recidivism, inability to maintain sobriety, multiple admissions secondary to alcohol-related issues and expressing suicidal ideation while intoxicated.    - Psych saw on 11/06 and adjusted seroquel, remeron.   - Psych discontinued hydroxyzine but I still have it available as needed for sleep, follow up consult occurred 11/06, request follow up as needed      Alcoholic cirrhosis with history of esophageal varices and ascites   History of GI bleed.  Grade 1 esophageal varices with portal gastropathy. He c/o diffuse abdominal discomfort and has obvious fluid wave on exam with diffuse mild tenderness on exam. Last paracentesis on 10/9 was negative for SBP and patient  currently refusing paracentesis stating pain no different now than prior to that procedure. S/p paracentesis 10/19.   Plan:  - Continue PPI   - PTA Atenolol stopped  - Nadolol started 10/29/2020.  Hold parameters for heart rate less than 55.  - GI following and appreciate their recommendations.  Stopped diuretics     Bradycardia  Suspect due to initiation of Nadolol while also receiving Atenolol   - Hold parameters for Nadolol in place for heart rate less than 55  - Atenolol has been discontinued   - Continue to monitor     Mild hyponatremia  Sodium stable in the low 130s -> 134  - Continue to monitor      Lactic acidosis secondary to above  Hemodynamically stable with low suspicion for infection / sepsis.  Likely related to liver disease      C. difficile colitis  D iagnosed during his last admission CT A/P with circumferential wall thickening of rectum suggesting distal colitis/proctitis. procalcitonin low at 0.07. WBC 7.7. Afebrile.  Not taking vancomycin and currently having 2-3 loose stools a day on admission.  - Finished a course of oral vancomycin 125 mg every 6 hours    NGUYEN on CKD stage III  Cr 1.63 on admission , Baseline creatinine 1.4-1.9.  Cr had bumped up to 2.1 over the past week, likely due to diuretics  Plan:  - Continue to monitor  - Diuretics stopped      Chronic anemia, nl MCV, stable  Hemoglobins initially in the 10s.  Have drifted down slightly to 8-9     BPH  - Continue flomax     JANIS,   - home CPAP ordered     Malnutrition:    - Level of malnutrition: Non-Severe   - Based on: mild (or greater) subcutaneous fat loss, mild (or greater) muscle loss         Diet: Snacks/Supplements Adult: Other; Chocolate Shake + 2 pkts benepro w/ lunch and dinner (RD); With Meals  Regular Diet Adult    DVT Prophylaxis: Pneumatic Compression Devices  Leger Catheter: not present  Code Status: Full Code           Disposition Plan   Expected discharge: TBD, undergoing court evaluation for commitment  Entered: Chivo  MD Divina 11/07/2020, 3:14 PM       The patient's care was discussed with the Bedside Nurse, Care Coordinator/ and Patient.    Chivo Murcia MD  Hospitalist Service  [facility]  Contact information available via [facility] Paging/Directory    ______________________________________________________________________    Interval History      Patient seen and examined. No acute events over night.  No fevers or chills.  No pain at this time.  No difficulty breathing. Abdomen with no significant pain or distension today. Reports main complaint again is anxiety.     Data reviewed today: I reviewed all medications, new labs and imaging results over the last 24 hours. I personally reviewed no images or EKG's today.    Physical Exam   Vital Signs: Temp: 98.4  F (36.9  C) Temp src: Oral BP: 136/69 Pulse: (!) 46   Resp: 18 SpO2: 98 % O2 Device: None (Room air)    Weight: 183 lbs 13.82 oz     General Appearance: Ambulating without any difficulty.  NAD   Respiratory: Clear to auscultation.  No respiratory distress  Cardiovascular: RRR.  No obvious murmurs  GI: Bowel sounds present.  Non-tender.  Abdomen is mildly distended but soft.  Skin: No rashes.  No cyanosis  Other: No edema.  No calf tenderness    Psych: Calm and pleasant, oriented x3    Data   Recent Labs   Lab 11/07/20  1016 11/06/20  1100 11/05/20  1213 11/03/20  1021 11/03/20  1021    134 132*   < > 130*   POTASSIUM 4.5 4.5 4.3   < > 3.9   CHLORIDE 110* 107 103   < > 101   CO2 24 25 23   < > 24   BUN 40* 37* 35*   < > 32*   CR 2.05* 2.04* 1.95*   < > 1.85*   ANIONGAP 5 2* 6   < > 5   KEV 8.5 8.6 8.4*   < > 8.4*   * 97 81   < > 130*   ALBUMIN  --   --   --   --  2.8*    < > = values in this interval not displayed.     No results found for this or any previous visit (from the past 24 hour(s)).

## 2020-11-07 NOTE — PLAN OF CARE
DATE & TIME:  11/7/20 [id number]  Cognitive Concerns/ Orientation : A/ox4   BEHAVIOR & AGGRESSION TOOL COLOR: Green  CIWA SCORE: 0   ABNL VS/O2: VSS RA  MOBILITY: IND  PAIN MANAGMENT: Denies  DIET: Reg  BOWEL/BLADDER: BR, WNL  ABNL LAB/BG: Na 134  DRAIN/DEVICES:   SKIN: Bruising, scabs/wounds  TESTS/PROCEDURES:   D/C DAY/GOALS/PLACE: Pending placement  OTHER IMPORTANT INFO: Court hold; psych and GI following

## 2020-11-08 LAB
ANION GAP SERPL CALCULATED.3IONS-SCNC: 6 MMOL/L (ref 3–14)
BUN SERPL-MCNC: 41 MG/DL (ref 7–30)
CALCIUM SERPL-MCNC: 8.4 MG/DL (ref 8.5–10.1)
CHLORIDE SERPL-SCNC: 107 MMOL/L (ref 94–109)
CO2 SERPL-SCNC: 21 MMOL/L (ref 20–32)
CREAT SERPL-MCNC: 2.17 MG/DL (ref 0.66–1.25)
GFR SERPL CREATININE-BSD FRML MDRD: 30 ML/MIN/{1.73_M2}
GLUCOSE SERPL-MCNC: 76 MG/DL (ref 70–99)
POTASSIUM SERPL-SCNC: 4.5 MMOL/L (ref 3.4–5.3)
SODIUM SERPL-SCNC: 134 MMOL/L (ref 133–144)

## 2020-11-08 PROCEDURE — 250N000013 HC RX MED GY IP 250 OP 250 PS 637: Performed by: STUDENT IN AN ORGANIZED HEALTH CARE EDUCATION/TRAINING PROGRAM

## 2020-11-08 PROCEDURE — 80048 BASIC METABOLIC PNL TOTAL CA: CPT | Performed by: INTERNAL MEDICINE

## 2020-11-08 PROCEDURE — 250N000013 HC RX MED GY IP 250 OP 250 PS 637: Performed by: PSYCHIATRY & NEUROLOGY

## 2020-11-08 PROCEDURE — 120N000001 HC R&B MED SURG/OB

## 2020-11-08 PROCEDURE — 36415 COLL VENOUS BLD VENIPUNCTURE: CPT | Performed by: INTERNAL MEDICINE

## 2020-11-08 PROCEDURE — 99232 SBSQ HOSP IP/OBS MODERATE 35: CPT | Performed by: STUDENT IN AN ORGANIZED HEALTH CARE EDUCATION/TRAINING PROGRAM

## 2020-11-08 PROCEDURE — 250N000013 HC RX MED GY IP 250 OP 250 PS 637: Performed by: INTERNAL MEDICINE

## 2020-11-08 PROCEDURE — 250N000013 HC RX MED GY IP 250 OP 250 PS 637: Performed by: HOSPITALIST

## 2020-11-08 RX ADMIN — QUETIAPINE FUMARATE 200 MG: 100 TABLET ORAL at 21:45

## 2020-11-08 RX ADMIN — CARBIDOPA AND LEVODOPA 7.5 MG: 50; 200 TABLET, EXTENDED RELEASE ORAL at 07:46

## 2020-11-08 RX ADMIN — FLUTICASONE FUROATE AND VILANTEROL TRIFENATATE 1 PUFF: 200; 25 POWDER RESPIRATORY (INHALATION) at 07:45

## 2020-11-08 RX ADMIN — VORTIOXETINE 10 MG: 10 TABLET, FILM COATED ORAL at 07:46

## 2020-11-08 RX ADMIN — PANTOPRAZOLE SODIUM 40 MG: 40 TABLET, DELAYED RELEASE ORAL at 21:45

## 2020-11-08 RX ADMIN — QUETIAPINE FUMARATE 100 MG: 100 TABLET ORAL at 07:49

## 2020-11-08 RX ADMIN — MIRTAZAPINE 15 MG: 15 TABLET, FILM COATED ORAL at 21:45

## 2020-11-08 RX ADMIN — NADOLOL 40 MG: 40 TABLET ORAL at 07:47

## 2020-11-08 RX ADMIN — HYDROXYZINE HYDROCHLORIDE 50 MG: 25 TABLET, FILM COATED ORAL at 21:46

## 2020-11-08 RX ADMIN — MULTIPLE VITAMINS W/ MINERALS TAB 1 TABLET: TAB at 07:49

## 2020-11-08 RX ADMIN — CARBIDOPA AND LEVODOPA 7.5 MG: 50; 200 TABLET, EXTENDED RELEASE ORAL at 11:04

## 2020-11-08 RX ADMIN — LIDOCAINE 1 PATCH: 560 PATCH PERCUTANEOUS; TOPICAL; TRANSDERMAL at 21:46

## 2020-11-08 RX ADMIN — FOLIC ACID 1 MG: 1 TABLET ORAL at 07:47

## 2020-11-08 RX ADMIN — QUETIAPINE FUMARATE 100 MG: 100 TABLET ORAL at 17:07

## 2020-11-08 RX ADMIN — NICOTINE 1 PATCH: 21 PATCH, EXTENDED RELEASE TRANSDERMAL at 07:50

## 2020-11-08 RX ADMIN — MAGNESIUM OXIDE 400 MG: 400 TABLET ORAL at 07:46

## 2020-11-08 RX ADMIN — PANTOPRAZOLE SODIUM 40 MG: 40 TABLET, DELAYED RELEASE ORAL at 07:47

## 2020-11-08 RX ADMIN — TAMSULOSIN HYDROCHLORIDE 0.4 MG: 0.4 CAPSULE ORAL at 07:47

## 2020-11-08 RX ADMIN — CARBIDOPA AND LEVODOPA 7.5 MG: 50; 200 TABLET, EXTENDED RELEASE ORAL at 17:07

## 2020-11-08 ASSESSMENT — ACTIVITIES OF DAILY LIVING (ADL)
ADLS_ACUITY_SCORE: 11

## 2020-11-08 NOTE — PROGRESS NOTES
"The patient has been notified of the following:     \"We have found that certain health care needs can be provided without the need for a face to face visit.  This service lets us provide the care you need with a phone conversation.      I will have full access to your [facility] medical record during this entire phone call.   I will be taking notes for your medical record.     Since this is like an office visit, we will bill your insurance company for this service.  If your insurance denies the charge we will appeal and/or write off the cost of the service.  The Governor's executive order may result in expanded health insurance coverage for this service, which would be paid by your insurance.         There are potential benefits and risks of telephone visits (e.g. limits to patient confidentiality) that differ from in-person visits.?  Confidentiality still applies for telephone services, and nobody will record the visit.  It is important to be in a quiet, private space that is free of distractions (including cell phone or other devices) during the visit.??     If during the course of the call I believe a telephone visit is not appropriate, you will not be charged for this service\"    Consent has been obtained for this service by care team member: Yes    Phone visit start time:  9:50am  Phone visit end time:  10:10am    Type of service:  Chemical Dependency Consult  Originating Location (pt. Location):  [facility] [address], [place] [address]  65  Distant Location (provider location):  Provider remote workspace  Mode of Communication:  Telephone visit  As the provider I attest to compliance with applicable laws and regulations related to telemedicine.  SYLVIE Sarmiento    Rule 25 Assessment  Background Information   1. Date of Assessment Request 11/7/20 2. Date of Assessment  11/8/2020 3. Date Service Authorized     4.   SYLVIE Sarmietno   5.  " "Phone Number   [phone] 6. Referent  [facility] 7. Assessment Site  [place] [address] [facility]     8. Client Name   Jim Richard 9. Date of Birth  1955 Age  65 year old 10. Gender  male  11. PMI/ Insurance No.  [id number]   12. Client's Primary Language:  English 13. Do you require special accommodations, such as an  or assistance with written material? No   14. Current Address: [address] [place] [address]   15. Client Phone Numbers: [phone] (home)      16. Tell me what has happened to bring you here today.    The history was obtained from reviewing medical records and staff ordered cd consult:    Pt seen, chart reviewed, case discussed with nursing staff and treating clinicians. Patient continues to await commitment process. Refer to  ongoing documentation for details in this regard. It appears he has a hearing arranged for 11/10/20 and the [facility] assigned to the patient has recommended [facility] [place] (long-term residential tx). Patient last seen by Dr. Davis on 11/2, at which time patient was endorsing the stress of the commitment process has led to higher anxiety, worsened mood, and difficulty with sleep. Dr. Davis made some adjustments, consolidating Seroquel dosing to 100 mg twice a day and 200 mg before bed, lowered Remeron to 15 mg, continued Trintellix 10 mg, and discontinued hydroxyzine and trazodone in an effort to reduce polypharmacy.      On interview today patient tells me \"I feel great, the best I've felt in quite a while.\" He notes he has a 90 day stint of sobriety while in tx, and even ion comparison to that he feels like he's never felt as stable. Feels physically better as well. He did have questions about the commitment process again, telling me he doesn't understand how the doctors could make those recs when he never talked with anyone for more than an hour or so. I explained the rationale, which was borne from " concerns of the potentially life threatening nature of his refractory relationship with alcohol overlaid with how it was further worsening his mental health. He expressed understanding.    17. Have you had other rule 25 assessments?     Yes. When, Where, and What circumstances: multiple R25 and updates, most recent update 04/2020, recommendations for IP    DIMENSION I - Acute Intoxication /Withdrawal Potential   1. Chemical use most recent 12 months outside a facility and other significant use history (client self-report)              X = Primary Drug Used   Age of First Use Most Recent Pattern of Use and Duration   Need enough information to show pattern (both frequency and amounts) and to show tolerance for each chemical that has a diagnosis   Date of last use and time, if needed   Withdrawal Potential? Requiring special care Method of use  (oral, smoked, snort, IV, etc)   x   Alcohol     15 Daily, pint/day up to 750mL   10/14/20 no oral      Marijuana/  Hashish   No use          Cocaine/Crack     No use          Meth/  Amphetamines   No use          Heroin     No use          Other Opiates/  Synthetics   No use          Inhalants     No use          Benzodiazepines     No use          Hallucinogens     No use          Barbiturates/  Sedatives/  Hypnotics No use          Over-the-Counter Drugs   No use          Other     No use          Nicotine     15 Pack/day 10/14/20 no smoke     2. Do you use greater amounts of alcohol/other drugs to feel intoxicated or achieve the desired effect?  Yes.  Or use the same amount and get less of an effect?  No.  Example: The patient reported having increased use and tolerance issues with alcohol.    3A. Have you ever been to detox?     Yes    3B. When was the first time?     2013    3C. How many times since then?     3+    3D. Date of most recent detox:     2016    4.  Withdrawal symptoms: Have you had any of the following withdrawal symptoms?  Past 12 months Recent (past 30  days)   Sweating (Rapid Pulse)  Shaky / Jittery / Tremors  Unable to Sleep  Agitation  Headache  Fatigue / Extremely Tired  Sad / Depressed Feeling  Muscle Aches  Vivid / Unpleasant Dreams  Irritability  Sensitivity to Noise  Nausea / Vomiting  Diarrhea  Diminished Appetite  Hallucinations  Confused / Disrupted Speech  Anxiety / Worried Shaky / Jittery / Tremors  Anxiety / Worried     's Visual Observations and Symptoms: alert and oriented    Based on the above information, is withdrawal likely to require attention as part of treatment participation?  No    Dimension I Ratings   Acute intoxication/Withdrawal potential - The placing authority must use the criteria in Dimension I to determine a client s acute intoxication and withdrawal potential.    RISK DESCRIPTIONS - Severity ratin Client displays full functioning with good ability to tolerate and cope with withdrawal discomfort. No signs or symptoms of intoxication or withdrawal or resolving signs or symptoms.    REASONS SEVERITY WAS ASSIGNED (What about the amount of the person s use and date of most recent use and history of withdrawal problems suggests the potential of withdrawal symptoms requiring professional assistance? )     Patient admitted with BAL of 0.36.  He was placed on CIWA protocol, this day was alert and oriented during interview.         DIMENSION II - Biomedical Complications and Conditions   1a. Do you have any current health/medical conditions?(Include any infectious diseases, allergies, or chronic or acute pain, history of chronic conditions)       Yes.   Illnesses/Medical Conditions you are receiving care for:   Past Medical History:   Diagnosis Date     Alcoholism (H) 2013    had treatment at [facility]     Anxiety      Coronary artery disease 2014    Nuclear stress test with slight fixed defect inferiorly, no ischemia     Depression     w/anxiety     Esophageal varices with bleeding 2018    6 varices banded  on EGD     Heart attack (H)      Hepatomegaly     Fatty liver, chronic alcoholic     Hyperlipemia      Hypertension      JANIS on CPAP      Peripheral vascular disease (H)      PVD (peripheral vascular disease) (H)      SDH (subdural hematoma) (H) 04/26/2018    Right side, resolved spontaneously     Spinal stenosis      Substance abuse (H)    .    1b. On a scale of mild, moderate to severe please specify the severity of the patient's diabetes and/or neuropathy.    The patient denied having a history of being diagnosed with diabetes or neuropathy.    2. Do you have a health care provider? When was your most recent appointment? What concerns were identified?     [facility]    3. If indicated by answers to items 1 or 2: How do you deal with these concerns? Is that working for you? If you are not receiving care for this problem, why not?      The patient reported taking prescription medications as prescribed for the above medical issues.    4A. List current medication(s) including over-the-counter or herbal supplements--including pain management:     Current Facility-Administered Medications   Medication     acetaminophen (TYLENOL) tablet 650 mg     bisacodyl (DULCOLAX) Suppository 10 mg     diphenhydrAMINE (BENADRYL) capsule 50 mg     fluticasone-vilanterol (BREO ELLIPTA) 200-25 MCG/INH inhaler 1 puff     folic acid (FOLVITE) tablet 1 mg     hydrOXYzine (ATARAX) tablet 50 mg     Lidocaine (LIDOCARE) 4 % Patch 1 patch    And     lidocaine patch in PLACE     lidocaine (LMX4) cream     lidocaine 1 % 0.1-1 mL     magnesium oxide (MAG-OX) tablet 400 mg     midodrine (PROAMATINE) tablet 7.5 mg     mirtazapine (REMERON) tablet 15 mg     multivitamin w/minerals (THERA-VIT-M) tablet 1 tablet     nadolol (CORGARD) tablet 40 mg     naloxone (NARCAN) injection 0.1-0.4 mg     nicotine (COMMIT) lozenge 2 mg     nicotine (NICODERM CQ) 21 MG/24HR 24 hr patch 1 patch     nicotine Patch in Place     ondansetron (ZOFRAN-ODT) ODT tab 4 mg    Or      ondansetron (ZOFRAN) injection 4 mg     pantoprazole (PROTONIX) EC tablet 40 mg     polyethylene glycol (MIRALAX) Packet 17 g     QUEtiapine (SEROquel) tablet 100 mg     QUEtiapine (SEROquel) tablet 200 mg     senna-docusate (SENOKOT-S/PERICOLACE) 8.6-50 MG per tablet 1 tablet    Or     senna-docusate (SENOKOT-S/PERICOLACE) 8.6-50 MG per tablet 2 tablet     sodium chloride (PF) 0.9% PF flush 3 mL     tamsulosin (FLOMAX) capsule 0.4 mg     vortioxetine (TRINTELLIX) tablet 10 mg       4B. Do you follow current medical recommendations/take medications as prescribed?     Yes    4C. When did you last take your medication?     20    4D. Do you need a referral to have a follow up with a primary care physician?    No.    5. Has a health care provider/healer ever recommended that you reduce or quit alcohol/drug use?     Yes    6. Are you pregnant?     NA, because the patient is male    7. Have you had any injuries, assaults/violence towards you, accidents, health related issues, overdose(s) or hospitalizations related to your use of alcohol or other drugs:     Yes, explain: 10 ED or hospital admissions related to alcohol use in 2020.    8. Do you have any specific physical needs/accommodations? No    Dimension II Ratings   Biomedical Conditions and Complications - The placing authority must use the criteria in Dimension II to determine a client s biomedical conditions and complications.   RISK DESCRIPTIONS - Severity ratin Client tolerates and ramone with physical discomfort and is able to get the services that the client needs.    REASONS SEVERITY WAS ASSIGNED (What physical/medical problems does this person have that would inhibit his or her ability to participate in treatment? What issues does he or she have that require assistance to address?)    See physician H&P for full medical history and current medications administered.  No health issues that would interfere with treatment participation.          DIMENSION III - Emotional, Behavioral, Cognitive Conditions and Complications   1. (Optional) Tell me what it was like growing up in your family. (substance use, mental health, discipline, abuse, support)     Raised by biological parents.  Family hx of substance use.     2. When was the last time that you had significant problems...  A. with feeling very trapped, lonely, sad, blue, depressed or hopeless  about the future? Past Month    B. with sleep trouble, such as bad dreams, sleeping restlessly, or falling  asleep during the day? Past Month    C. with feeling very anxious, nervous, tense, scared, panicked, or like  something bad was going to happen? Past Month    D. with becoming very distressed and upset when something reminded  you of the past? Past Month    E. with thinking about ending your life or committing suicide? 2 - 12 months ago    3. When was the last time that you did the following things two or more times?  A. Lied or conned to get things you wanted or to avoid having to do  something? Never    B. Had a hard time paying attention at school, work, or home? Never    C. Had a hard time listening to instructions at school, work, or home? Never    D. Were a bully or threatened other people? Never    E. Started physical fights with other people? Never    Note: These questions are from the Global Appraisal of Individual Needs--Short Screener. Any item marked  past month  or  2 to 12 months ago  will be scored with a severity rating of at least 2.     For each item that has occurred in the past month or past year ask follow up questions to determine how often the person has felt this way or has the behavior occurred? How recently? How has it affected their daily living? And, whether they were using or in withdrawal at the time?    Get depressed when I think about relationships I have lost.  Shame regarding relapsing.  Stress regarding the commitment.    4A. If the person has answered item 2E with  in  the past year  or  the past month , ask about frequency and history of suicide in the family or someone close and whether they were under the influence.     Denies any current.    Any history of suicide in your family? Or someone close to you?     The patient denied any family member or someone close to the patient had ever completed suicide.    4B. If the person answered item 2E  in the past month  ask about  intent, plan, means and access and any other follow-up information  to determine imminent risk. Document any actions taken to intervene  on any identified imminent risk.      Denies any current.    5A. Have you ever been diagnosed with a mental health problem?     Yes, explain: anxiety and depression      5B. Are you receiving care for any mental health issues? If yes, what is the focus of that care or treatment?  Are you satisfied with the service? Most recent appointment?  How has it been helpful?     No individual therapy at all.  [facility] mental [facility].    6. Have you been prescribed medications for emotional/psychological problems?     Yes, see Dim2    7. Does your MH provider know about your use?     The patient does not currently have any mental health providers.    8A. Have you ever been verbally, emotionally, physically or sexually abused?      No     Follow up questions to learn current risk, continuing emotional impact.      The patient denied having any history of being verbally, emotionally, physically or sexually abused.    8B. Have you received counseling for abuse?      The patient denied having any history of being verbally, emotionally, physically or sexually abused.    9. Have you ever experienced or been part of a group that experienced community violence, historical trauma, rape or assault?     No    10A. [facility]:    No    11. Do you have problems with any of the following things in your daily life?    No      Note: If the person has any of the above problems, follow up with  items 12, 13, and 14. If none of the issues in item 11 are a problem for the person, skip to item 15.    The patient denied having any history of having problems with headaches, dizziness, problem solving, concentration, performing job/school work, remembering, in relationships with others, reading, writing, calculation, fights, being fired or arrests in his daily life.    12. Have you been diagnosed with traumatic brain injury or Alzheimer s?  No    13. If the answer to #12 is no, ask the following questions:    Have you ever hit your head or been hit on the head? Yes    Were you ever seen in the Emergency Room, hospital or by a doctor because of an injury to your head? Yes    Have you had any significant illness that affected your brain (brain tumor, meningitis, West Nile Virus, stroke or seizure, heart attack, near drowning or near suffocation)? No    14. If the answer to #12 is yes, ask if any of the problems identified in #11 occurred since the head injury or loss of oxygen. No    15A. Highest grade of school completed:     14 years    15B. Do you have a learning disability? No    15C. Did you ever have tutoring in Math or English? No    15D. Have you ever been diagnosed with Fetal Alcohol Effects or Fetal Alcohol Syndrome? No    16. If yes to item 15 B, C, or D: How has this affected your use or been affected by your use?     The patient denied having any history of a learning disability, tutoring in math or English or being diagnosed with Fetal Alcohol Effects or Fetal Alcohol Syndrome.    Dimension III Ratings   Emotional/Behavioral/Cognitive - The placing authority must use the criteria in Dimension III to determine a client s emotional, behavioral, and cognitive conditions and complications.   RISK DESCRIPTIONS - Severity ratin Client has difficulty with impulse control and lacks coping skills. Client has thoughts of suicide or harm to others without means; however, the thoughts may interfere with  participation in some treatment activities. Client has difficulty functioning in significant life areas. Client has moderate symptoms of emotional, behavioral, or cognitive problems. Client is able to participate in most treatment activities.    REASONS SEVERITY WAS ASSIGNED - What current issues might with thinking, feelings or behavior pose barriers to participation in a treatment program? What coping skills or other assets does the person have to offset those issues? Are these problems that can be initially accommodated by a treatment provider? If not, what specialized skills or attributes must a provider have?    Patient reports history of anxiety and depression.  He reports no history of outpatient mental health services.  He reports stressors related to ongoing drinking.  Patient did not report any current suicidal ideation.  Patient was seen by psychiatry, please refer to psychiatrist notes for further mental health assessment information.         DIMENSION IV - Readiness for Change   1. You ve told me what brought you here today. (first section) What do you think the problem really is?     I have not had any outpatient.    2. Tell me how things are going. Ask enough questions to determine whether the person has use related problems or assets that can be built upon in the following areas: Family/friends/relationships; Legal; Financial; Emotional; Educational; Recreational/ leisure; Vocational/employment; Living arrangements (DSM)      I need to sell my home.    3. What activities have you engaged in when using alcohol/other drugs that could be hazardous to you or others (i.e. driving a car/motorcycle/boat, operating machinery, unsafe sex, sharing needles for drugs or tattoos, etc     Driving    4. How much time do you spend getting, using or getting over using alcohol or drugs? (DSM)     Daily    5. Reasons for drinking/drug use (Use the space below to record answers. It may not be necessary to ask each  item.)  Like the feeling Yes   Trying to forget problems Yes   To cope with stress Yes   To relieve physical pain Yes   To cope with anxiety Yes   To cope with depression Yes   To relax or unwind No   Makes it easier to talk with people Yes   Partner encourages use No   Most friends drink or use Yes   To cope with family problems Yes   Afraid of withdrawal symptoms/to feel better Yes   Other (specify)  No     A. What concerns other people about your alcohol or drug use/Has anyone told you that you use too much? What did they say? (DSM)     Yes, friend and family have been concerned.    B. What did you think about that/ do you think you have a problem with alcohol or drug use?     Yes    6. What changes are you willing to make? What substance are you willing to stop using? How are you going to do that? Have you tried that before? What interfered with your success with that goal?      It's my willpower that is missing, my will.  The one who has to do it is me.  I want to stay sober, I don't want to die a drunk or an alcoholic.    7. What would be helpful to you in making this change?     I need to do the 12 steps again and be more involved.    Dimension IV Ratings   Readiness for Change - The placing authority must use the criteria in Dimension IV to determine a client s readiness for change.   RISK DESCRIPTIONS - Severity ratin Client displays verbal compliance, but lacks consistent behaviors; has low motivation for change; and is passively involved in treatment.    REASONS SEVERITY WAS ASSIGNED - (What information did the person provide that supports your assessment of his or her readiness to change? How aware is the person of problems caused by continued use? How willing is she or he to make changes? What does the person feel would be helpful? What has the person been able to do without help?)      Patient verbalizes a desire to establish and maintain abstinence. He has refused recommendations and referrals in  the past so does not have the best track of follow through.          DIMENSION V - Relapse, Continued Use, and Continued Problem Potential   1A. In what ways have you tried to control, cut-down or quit your use? If you have had periods of sobriety, how did you accomplish that? What was helpful? What happened to prevent you from continuing your sobriety? (DSM)     Longest sobriety has been 6 months. Past year longest has been 100-120 days.  AA has been the most helpful.  If I drink the night before I won't go, that is my rule.    1B. What were the circumstances of your most recent relapse with mood altering chemicals?    Usually something happens in the family, I just think about the loss of my family.    2. Have you experienced cravings? If yes, ask follow up questions to determine if the person recognizes triggers and if the person has had any success in dealing with them.     Yes    3. Have you been treated for alcohol/other drug abuse/dependence? Yes.  3B. Number of times(lifetime) (over what period) 5.  3C. Number of times completed treatment (lifetime) 4.  3D. During the past three years have you participated in outpatient and/or residential?  Yes.  3E. When and where? Past year went to [facility] (04/2020) and [facility] (summer 2020). Hx of [facility] [place]  3F. What was helpful? What was not? 12 steps.    4. Support group participation: Have you/do you attend support group meetings to reduce/stop your alcohol/drug use? How recently? What was your experience? Are you willing to restart? If the person has not participated, is he or she willing?     I do go when I am keeping sober.  Been a long time.    5. What would assist you in staying sober/straight?     Outpatient treatment and AA    Dimension V Ratings   Relapse/Continued Use/Continued problem potential - The placing authority must use the criteria in Dimension V to determine a client s relapse, continued use, and continued  problem potential.   RISK DESCRIPTIONS - Severity rating: 3 Client has poor recognition and understanding of relapse and recidivism issues and displays moderately high vulnerability for further substance use or mental health problems. Client has few coping skills and rarely applies coping skills.    REASONS SEVERITY WAS ASSIGNED - (What information did the person provide that indicates his or her understanding of relapse issues? What about the person s experience indicates how prone he or she is to relapse? What coping skills does the person have that decrease relapse potential?)      Patient has been to multiple treatments and has participated in support groups; however, has lacked ability or desire to maintain abstinence following discharge from controlled environments.  He has knowledge of daily sober living skills but has not applied.         DIMENSION VI - Recovery Environment   1. Are you employed/attending school? Tell me about that.     No    2A. Describe a typical day; evening for you. Work, school, social, leisure, volunteer, spiritual practices. Include time spent obtaining, using, recovering from drugs or alcohol. (DSM)     Drinking    Please describe what leisure activities have been associated with your substance abuse:     The patient denied having any leisure activities which had been associated with his substance abuse.    2B. How often do you spend more time than you planned using or use more than you planned? (DSM)     Increases in amount only a in a couple weeks.    3. How important is using to your social connections? Do many of your family or friends use?     Not important    4A. Are you currently in a significant relationship?     No    4C. Sexual Orientation:     Heterosexual    5A. Who do you live with?      With a roommate    5B. Tell me about their alcohol/drug use and mental health issues.     No use by roommated    5C. Are you concerned for your safety there? No    5D. Are you concerned  about the safety of anyone else who lives with you? No    6A. Do you have children who live with you?     No    6B. Do you have children who do not live with you?     No    7A. Who supports you in making changes in your alcohol or drug use? What are they willing to do to support you? Who is upset or angry about you making changes in your alcohol or drug use? How big a problem is this for you?      BrotherJame.    7B. This table is provided to record information about the person s relationships and available support It is not necessary to ask each item; only to get a comprehensive picture of their support system.  How often can you count on the following people when you need someone?   Partner / Spouse The patient does not have a current partner or spouse.   Parent(s)/Aunt(s)/Uncle(s)/Grandparents The patient's parents and other family members are .   Sibling(s)/Cousin(s) Always supportive   Child(iesha) The patient doesn't have any children.   Other relative(s) Usually supportive   Friend(s)/neighbor(s) Always supportive   Child(iesha) s father(s)/mother(s) The patient doesn't have any children.   Support group member(s) Always supportive   [place] of [place] members The patient denied having any current involvement with community [place] members.   /counselor/therapist/healer The patient denied having any current involvement with a , counselor, therapist or healer.   Other (specify) No     8A. What is your current living situation?     Own home    8B. What is your long term plan for where you will be living?     I am thinking I might sell my place and move into some form of group housing or assisted living    8C. Tell me about your living environment/neighborhood? Ask enough follow up questions to determine safety, criminal activity, availability of alcohol and drugs, supportive or antagonistic to the person making changes.      My home is the place I have always drank    9. Criminal  justice history: Gather current/recent history and any significant history related to substance use--Arrests? Convictions? Circumstances? Alcohol or drug involvement? Sentences? Still on probation or parole? Expectations of the court? Current court order? Any sex offenses - lifetime? What level? (DSM)    Pending [facility] commitment.  Previous civil commitment which ended 09/2020.  Denies any current probation or pending legal issues.    10. What obstacles exist to participating in treatment? (Time off work, childcare, funding, transportation, pending FDC time, living situation)     The patient denied having any obstacles for participating in substance abuse treatment.    Dimension VI Ratings   Recovery environment - The placing authority must use the criteria in Dimension VI to determine a client s recovery environment.   RISK DESCRIPTIONS - Severity rating: 3 Client is not engaged in structured, meaningful activity and the client's peers, family, significant other, and living environment are unsupportive, or there is significant criminal justice system involvement.    REASONS SEVERITY WAS ASSIGNED - (What support does the person have for making changes? What structure/stability does the person have in his or her daily life that will increase the likelihood that changes can be sustained? What problems exist in the person s environment that will jeopardize getting/staying clean and sober?)     Patient is not working, he is single and lives with a roommate.  He reports roommate does not drink and that his support network does not promote use.  Patient appears to isolate when drinking. Patient denied any current criminal legal issue.  There is a pending  civil commitment.         Client Choice/Exceptions   Would you like services specific to language, age, gender, culture, Judaism preference, race, ethnicity, sexual orientation or disability?  No    What particular treatment choices and options would you  like to have? outpatient    Do you have a preference for a particular treatment program? outpatient    Criteria for Diagnosis     Criteria for Diagnosis  DSM-5 Criteria for Substance Use Disorder  Instructions: Determine whether the client currently meets the criteria for Substance Use Disorder using the diagnostic criteria in the DSM-V pp.481-589. Current means during the most recent 12 months outside a facility that controls access to substances    Category of Substance Severity (ICD-10 Code / DSM 5 Code)     Alcohol Use Disorder Severe  (10.20) (303.90)   Cannabis Use Disorder The patient does not meet the criteria for a Cannabis use disorder.   Hallucinogen Use Disorder The patient does not meet the criteria for a Hallucinogen use disorder.   Inhalant Use Disorder The patient does not meet the criteria for an Inhalant use disorder.   Opioid Use Disorder The patient does not meet the criteria for an Opioid use disorder.   Sedative, Hypnotic, or Anxiolytic Use Disorder The patient does not meet the criteria for a Sedative/Hypnotic use disorder.   Stimulant Related Disorder The patient does not meet the criteria for a Stimulant use disorder.   Tobacco Use Disorder Moderate   (F17.200) (305.1)   Other (or unknown) Substance Use Disorder The patient does not meet the criteria for a Other (or unknown) Substance use disorder.       Collateral Contact Summary   Number of contacts made: 1    Contact with referring person:  Yes    If court related records were reviewed, summarize here: No court records had been reviewed at the time of this documentation.    Information from collateral contacts supported/largely agreed with information from the client and associated risk ratings.      Rule 25 Assessment Summary and Plan   's Recommendation    Patient is recommended to attend an intensive outpatient program with lodging.  This will provide patient with a longer treatment participation than traditional residential  treatment, and give patient the ability to learn to how to apply daily sober living skills in a less restrictive environment but still be in a sober environment.  He has been able to complete traditional residential treatment programs but has lacked ability to maintain abstinence once discharged to home. He would benefit from accountability in his environment versus secured setting.  During this interview patient reported he would refuse any recommendations other than outpatient.      Collateral Contacts     Name:    [facility]   Relationship:    Medical records   Phone Number:    [phone] Releases:    No     EHR was reviewed and discussed care plan with staff.  Below is  note from 11/6/20:    Additional Information:  Patient has his hearing via phone with [facility] on 11-10 at 0900.Patient shares his plan is to move to an assisted living in [place]  He feels in the [facility] he will have people around him which will decrease his desire to drink.  [place]  Omayra Angela [phone] is aware of his plan.  She reports she is recommending the admission to [facility] in [place].  At [facility], patient is provided lodging, CD programming and medical oversite.  [facility] also has long term housing. Her response to patient's plan to move into [facility] is that he can work on the arrangements at [facility].  She notes patient will first need to sell his house before he can pay for the assisted living.   Omayra will call [facility] on Monday and ask if they received the faxed [facility] medical record and determine if they will accept patient.  [facility] is aware patient has a Cdiff history.   Per Ms Angeal,patient will need a Rule 25 assessment or update as the Rule 25 will approve funding for [facility].  Writer has asked Dr Murcia to enter a CD consult for patient.      Collateral Contacts     Name:    [facility]   Relationship:    Medical  "records   Phone Number:    [phone]   Releases:    No     Below is psychiatry consult from 11/6/20:    [facility] Psychiatric Consult Progress Note     Interval History:   Pt seen, chart reviewed, case discussed with nursing staff and treating clinicians. Patient continues to await commitment process. Refer to  ongoing documentation for details in this regard. It appears he has a hearing arranged for 11/10/20 and the [facility] assigned to the patient has recommended [facility] (long-term residential tx). Patient last seen by Dr. Davis on 11/2, at which time patient was endorsing the stress of the commitment process has led to higher anxiety, worsened mood, and difficulty with sleep. Dr. Davis made some adjustments, consolidating Seroquel dosing to 100 mg twice a day and 200 mg before bed, lowered Remeron to 15 mg, continued Trintellix 10 mg, and discontinued hydroxyzine and trazodone in an effort to reduce polypharmacy.      On interview today patient tells me \"I feel great, the best I've felt in quite a while.\" He notes he has a 90 day stint of sobriety while in tx, and even ion comparison to that he feels like he's never felt as stable. Feels physically better as well. He did have questions about the commitment process again, telling me he doesn't understand how the doctors could make those recs when he never talked with anyone for more than an hour or so. I explained the rationale, which was borne from concerns of the potentially life threatening nature of his refractory relationship with alcohol overlaid with how it was further worsening his mental health. He expressed understanding.         ollateral Contacts      A problematic pattern of alcohol/drug use leading to clinically significant impairment or distress, as manifested by at least two of the following, occurring within a 12-month period:    1.) Alcohol/drug is often taken in larger amounts or over a longer period " than was intended.  2.) There is a persistent desire or unsuccessful efforts to cut down or control alcohol/drug use  3.) A great deal of time is spent in activities necessary to obtain alcohol, use alcohol, or recover from its effects.  4.) Craving, or a strong desire or urge to use alcohol/drug  6.) Continued alcohol use despite having persistent or recurrent social or interpersonal problems caused or exacerbated by the effects of alcohol/drug.  7.) Important social, occupational, or recreational activities are given up or reduced because of alcohol/drug use.  8.) Recurrent alcohol/drug use in situations in which it is physically hazardous.  9.) Alcohol/drug use is continued despite knowledge of having a persistent or recurrent physical or psychological problem that is likely to have been caused or exacerbated by alcohol.  10.) Tolerance, as defined by either of the following: A need for markedly increased amounts of alcohol/drug to achieve intoxication or desired effect.  11.) Withdrawal, as manifested by either of the following: The characteristic withdrawal syndrome for alcohol/drug (refer to Criteria A and B of the criteria set for alcohol/drug withdrawal). and Alcohol/drug (or a closely related substance, such as a benzodiazepine) is taken to relieve or avoid withdrawal symptoms.      Specify if: In early remission:  After full criteria for alcohol/drug use disorder were previously met, none of the criteria for alcohol/drug use disorder have been met for at least 3 months but for less than 12 months (with the exception that Criterion A4,  Craving or a strong desire or urge to use alcohol/drug  may be met).     In sustained remission:   After full criteria for alcohol use disorder were previously met, non of the criteria for alcohol/drug use disorder have been met at any time during a period of 12 months or longer (with the exception that Criterion A4,  Craving or strong desire or urge to use alcohol/drug  may  be met).   Specify if:   This additional specifier is used if the individual is in an environment where access to alcohol is restricted.    Mild: Presence of 2-3 symptoms  Moderate: Presence of 4-5 symptoms  Severe: Presence of 6 or more symptoms

## 2020-11-08 NOTE — PLAN OF CARE
Cognitive Concerns/ Orientation : A&O x4, pleasant.   BEHAVIOR & AGGRESSION TOOL COLOR: Green  CIWA SCORE: NA  ABNL VS/O2:  VSS on RA ex mandi  MOBILITY: Ind, walks in halls frequently when awake   PAIN MANAGMENT: Denies  DIET: Regular, good po  BOWEL/BLADDER: continent, up to BR  ABNL LAB/BG: Na 139, Cr 2.05  DRAIN/DEVICES: No IV access, MD aware   TELEMETRY RHYTHM: NA  SKIN: Bruised, scabs, BLE 1-2+ edema   TESTS/PROCEDURES: none pending  D/C DAY/GOALS/PLACE: Pending, undergoing court eval for commitment. SW following  OTHER IMPORTANT INFO: Pt on Court hold. Court Hearing 11/10. GI and Psych following.

## 2020-11-08 NOTE — CONSULTS
11/8/20 chem dep consult completed.      Met with patient via telephone visit, patient was agreeable to evaluation.  Patient is familiar to chem dep services from multiple hospitalizations over the past few years.  He was last seen by myself in 10/2019 and a full Rule 25 evaluation was completed.  Patient and I completed a full substance use evaluation today.  Patient is hopeful for recommendation to outpatient treatment as he feels he has gotten all he can form residential treatments.  He is frustrated with the commitment that petitioned for him and reports he will refuse any recommendation other than outpatient.  I informed patient that residential would most likely be the recommendation but I would consult with providers and further review EHR to gather all necessary information to support my recommendation.     Sherly Royal, [facility]  [phone]

## 2020-11-08 NOTE — PROGRESS NOTES
[facility]    Medicine Progress Note - Hospitalist Service       Date of Admission:  10/14/2020  Date of Service: 11/08/2020    Assessment & Plan        Jim Richard is a 65 year old male was admitted on 10/14/2020 with PMH use disorder, cirrhosis, depression, recent hospitalization for intentional overdose, multiple hospitalization for intoxication who is being admitted again on 10/14/2020, due to continued drinking and suicidal ideation, stating that he would take all of his medications at once.  Recently hospitalized from 10/6 - 10/11 for alcohol intoxication with metabolic acidosis and C. difficile.  After discharge he states he stopped taking all of his medications and has been drinking because he does not care whether he lives or dies. Currently on a hold pending court hearing for commitment.      Depression, anxiety, suicidal ideation   Acute alcohol intoxication  Severe alcohol use disorder - multiple recent admission for intoxication at risk of life-threateningillness/death  Patient has been through treatment multiple times in the past. He states he stating drinking again right after discharge. BAL 0.36 at admission.    - CIWA stopped  - Psychiatry was consulted-> has initiated a commitment process due to his recidivism, inability to maintain sobriety, multiple admissions secondary to alcohol-related issues and expressing suicidal ideation while intoxicated.    - Psych saw on 11/06 and adjusted seroquel, remeron.   - Psych discontinued hydroxyzine but I still have it available as needed for sleep, follow up consult occurred 11/06, request follow up as needed      Alcoholic cirrhosis with history of esophageal varices and ascites   History of GI bleed.  Grade 1 esophageal varices with portal gastropathy. He c/o diffuse abdominal discomfort and has obvious fluid wave on exam with diffuse mild tenderness on exam. Last paracentesis on 10/9 was negative for SBP and patient  currently refusing paracentesis stating pain no different now than prior to that procedure. S/p paracentesis 10/19.   Plan:  - Continue PPI   - PTA Atenolol stopped  - Nadolol started 10/29/2020.  Hold parameters for heart rate less than 55.  - GI following and appreciate their recommendations.  Stopped diuretics     Bradycardia  Suspect due to initiation of Nadolol while also receiving Atenolol   - Hold parameters for Nadolol in place for heart rate less than 55  - Atenolol has been discontinued   - Continue to monitor     Mild hyponatremia  Sodium stable in the low 130s -> 134  - Continue to monitor      Lactic acidosis secondary to above  Hemodynamically stable with low suspicion for infection / sepsis.  Likely related to liver disease      C. difficile colitis  D iagnosed during his last admission CT A/P with circumferential wall thickening of rectum suggesting distal colitis/proctitis. procalcitonin low at 0.07. WBC 7.7. Afebrile.  Not taking vancomycin and currently having 2-3 loose stools a day on admission.  - Finished a course of oral vancomycin 125 mg every 6 hours    NGUYEN on CKD stage III  Cr 1.63 on admission , Baseline creatinine 1.4-1.9.  Cr had bumped up to 2.1 over the past week, likely due to diuretics  Plan:  - Continue to monitor  - Diuretics stopped      Chronic anemia, nl MCV, stable  Hemoglobins initially in the 10s.  Have drifted down slightly to 8-9     BPH  - Continue flomax     JANIS,   - home CPAP ordered     Malnutrition:    - Level of malnutrition: Non-Severe   - Based on: mild (or greater) subcutaneous fat loss, mild (or greater) muscle loss         Diet: Snacks/Supplements Adult: Other; Chocolate Shake + 2 pkts benepro w/ lunch and dinner (RD); With Meals  Regular Diet Adult    DVT Prophylaxis: Pneumatic Compression Devices  Leger Catheter: not present  Code Status: Full Code           Disposition Plan   Expected discharge: TBD, undergoing court evaluation for commitment  Entered: Chivo  MD Divina 11/08/2020, 4:55 PM       The patient's care was discussed with the Bedside Nurse, Care Coordinator/ and Patient.    Chivo Murcai MD  Hospitalist Service  [facility]  Contact information available via [facility] Paging/Directory    ______________________________________________________________________    Interval History      Patient seen and examined. No acute events over night.  No fevers or chills.  No pain at this time. No CP/SOB.  Abdomen with no significant pain today. Reports main complaint again is worsening depression and anxiety given unclear timeline as to when he will be discharged.     Data reviewed today: I reviewed all medications, new labs and imaging results over the last 24 hours. I personally reviewed no images or EKG's today.    Physical Exam   Vital Signs: Temp: 98.5  F (36.9  C) Temp src: Oral BP: 128/55 Pulse: (!) 49   Resp: 16 SpO2: 97 % O2 Device: None (Room air)    Weight: 187 lbs 12.8 oz     General Appearance: Ambulating without any difficulty.  NAD   Respiratory: Clear to auscultation.  No respiratory distress  Cardiovascular: RRR.  No obvious murmurs  GI: Bowel sounds present.  Non-tender.  Abdomen is mildly distended but soft.  Skin: No rashes.  No cyanosis  Other: No edema.  No calf tenderness    Psych: Calm and pleasant, oriented x3    Data   Recent Labs   Lab 11/08/20  1155 11/07/20  1016 11/06/20  1100 11/03/20  1021 11/03/20  1021    139 134   < > 130*   POTASSIUM 4.5 4.5 4.5   < > 3.9   CHLORIDE 107 110* 107   < > 101   CO2 21 24 25   < > 24   BUN 41* 40* 37*   < > 32*   CR 2.17* 2.05* 2.04*   < > 1.85*   ANIONGAP 6 5 2*   < > 5   KEV 8.4* 8.5 8.6   < > 8.4*   GLC 76 109* 97   < > 130*   ALBUMIN  --   --   --   --  2.8*    < > = values in this interval not displayed.     No results found for this or any previous visit (from the past 24 hour(s)).

## 2020-11-08 NOTE — PLAN OF CARE
"DATE & TIME: 11/7/2020 9054-9617   Cognitive Concerns/ Orientation : A & O x4, pleasant.   BEHAVIOR & AGGRESSION TOOL COLOR: Green  CIWA SCORE: NA  ABNL VS/O2:  VSS on RA ex bradycardia.  MOBILITY: IND, walks in halls frequently when awake.   PAIN MANAGMENT: Denies  DIET: Regular, good po  BOWEL/BLADDER: continent, up to BR. No issues per pt report.   ABNL LAB/BG: Urea Nitrogen 40, and Cr 2.05.   DRAIN/DEVICES: No IV access, MD aware   TELEMETRY RHYTHM: N/A  SKIN: Bruised, scabs, BLE 1-2+ edema   TESTS/PROCEDURES: None scheduled.   D/C DAY/GOALS/PLACE: Pending, per MD note \"TBD, undergoing court evaluation for commitment\".   OTHER IMPORTANT INFO: Pt on Court hold. Court Hearing 11/10 @ 0900 via phone. Nicotine patch in place. Slept well. No complaints overnight.   "

## 2020-11-08 NOTE — PLAN OF CARE
Cognitive Concerns/ Orientation : A&O x4, pleasant.   BEHAVIOR & AGGRESSION TOOL COLOR: Green  CIWA SCORE: NA  ABNL VS/O2:  VSS on RA ex bradycardia.  MOBILITY: IND, walks in halls frequently when awake.   PAIN MANAGMENT: Denies  DIET: Regular, good PO intake  BOWEL/BLADDER: continent, up to BR. No issues per pt report.   ABNL LAB/BG: Cr 2.17, Na   DRAIN/DEVICES: No IV access, MD aware   TELEMETRY RHYTHM: N/A  SKIN: Bruised, scabs, BLE 1-2+ edema   TESTS/PROCEDURES: None scheduled.   D/C DAY/GOALS/PLACE: Pending, per MD and SW notes.   OTHER IMPORTANT INFO: Pt on Court hold. Court Hearing 11/10 @ 0900 via phone.

## 2020-11-09 LAB
ANION GAP SERPL CALCULATED.3IONS-SCNC: 6 MMOL/L (ref 3–14)
BUN SERPL-MCNC: 42 MG/DL (ref 7–30)
CALCIUM SERPL-MCNC: 8.5 MG/DL (ref 8.5–10.1)
CHLORIDE SERPL-SCNC: 111 MMOL/L (ref 94–109)
CO2 SERPL-SCNC: 22 MMOL/L (ref 20–32)
CREAT SERPL-MCNC: 2.1 MG/DL (ref 0.66–1.25)
GFR SERPL CREATININE-BSD FRML MDRD: 32 ML/MIN/{1.73_M2}
GLUCOSE SERPL-MCNC: 144 MG/DL (ref 70–99)
POTASSIUM SERPL-SCNC: 4.2 MMOL/L (ref 3.4–5.3)
SODIUM SERPL-SCNC: 139 MMOL/L (ref 133–144)

## 2020-11-09 PROCEDURE — 120N000001 HC R&B MED SURG/OB

## 2020-11-09 PROCEDURE — 99232 SBSQ HOSP IP/OBS MODERATE 35: CPT | Performed by: STUDENT IN AN ORGANIZED HEALTH CARE EDUCATION/TRAINING PROGRAM

## 2020-11-09 PROCEDURE — 250N000013 HC RX MED GY IP 250 OP 250 PS 637: Performed by: STUDENT IN AN ORGANIZED HEALTH CARE EDUCATION/TRAINING PROGRAM

## 2020-11-09 PROCEDURE — 80048 BASIC METABOLIC PNL TOTAL CA: CPT | Performed by: INTERNAL MEDICINE

## 2020-11-09 PROCEDURE — 250N000013 HC RX MED GY IP 250 OP 250 PS 637: Performed by: INTERNAL MEDICINE

## 2020-11-09 PROCEDURE — 36415 COLL VENOUS BLD VENIPUNCTURE: CPT | Performed by: INTERNAL MEDICINE

## 2020-11-09 PROCEDURE — 250N000013 HC RX MED GY IP 250 OP 250 PS 637: Performed by: PSYCHIATRY & NEUROLOGY

## 2020-11-09 RX ADMIN — VORTIOXETINE 10 MG: 10 TABLET, FILM COATED ORAL at 07:55

## 2020-11-09 RX ADMIN — LIDOCAINE 1 PATCH: 560 PATCH PERCUTANEOUS; TOPICAL; TRANSDERMAL at 19:51

## 2020-11-09 RX ADMIN — PANTOPRAZOLE SODIUM 40 MG: 40 TABLET, DELAYED RELEASE ORAL at 21:33

## 2020-11-09 RX ADMIN — CARBIDOPA AND LEVODOPA 7.5 MG: 50; 200 TABLET, EXTENDED RELEASE ORAL at 17:13

## 2020-11-09 RX ADMIN — CARBIDOPA AND LEVODOPA 7.5 MG: 50; 200 TABLET, EXTENDED RELEASE ORAL at 07:55

## 2020-11-09 RX ADMIN — TAMSULOSIN HYDROCHLORIDE 0.4 MG: 0.4 CAPSULE ORAL at 07:54

## 2020-11-09 RX ADMIN — FOLIC ACID 1 MG: 1 TABLET ORAL at 07:54

## 2020-11-09 RX ADMIN — CARBIDOPA AND LEVODOPA 7.5 MG: 50; 200 TABLET, EXTENDED RELEASE ORAL at 11:49

## 2020-11-09 RX ADMIN — MULTIPLE VITAMINS W/ MINERALS TAB 1 TABLET: TAB at 07:53

## 2020-11-09 RX ADMIN — NICOTINE 1 PATCH: 21 PATCH, EXTENDED RELEASE TRANSDERMAL at 08:00

## 2020-11-09 RX ADMIN — MIRTAZAPINE 15 MG: 15 TABLET, FILM COATED ORAL at 21:33

## 2020-11-09 RX ADMIN — QUETIAPINE FUMARATE 100 MG: 100 TABLET ORAL at 17:13

## 2020-11-09 RX ADMIN — PANTOPRAZOLE SODIUM 40 MG: 40 TABLET, DELAYED RELEASE ORAL at 08:06

## 2020-11-09 RX ADMIN — QUETIAPINE FUMARATE 200 MG: 100 TABLET ORAL at 21:33

## 2020-11-09 RX ADMIN — HYDROXYZINE HYDROCHLORIDE 50 MG: 25 TABLET, FILM COATED ORAL at 21:36

## 2020-11-09 RX ADMIN — QUETIAPINE FUMARATE 100 MG: 100 TABLET ORAL at 08:06

## 2020-11-09 RX ADMIN — MAGNESIUM OXIDE 400 MG: 400 TABLET ORAL at 07:53

## 2020-11-09 ASSESSMENT — ACTIVITIES OF DAILY LIVING (ADL)
ADLS_ACUITY_SCORE: 11

## 2020-11-09 NOTE — PLAN OF CARE
DATE & TIME: 11/8/2020 [id number]    Cognitive Concerns/ Orientation : Pt A/Ox4   BEHAVIOR & AGGRESSION TOOL COLOR: Green   ABNL VS/O2: VSS on RA, bradycardic  MOBILITY: Independent  PAIN MANAGMENT: Denies  DIET: Regular  BOWEL/BLADDER: Continent  ABNL LAB/BG: Cr 2.17  DRAIN/DEVICES: No IV access  SKIN: Bruised, scabs, bilateral lower extremity trace edema  D/C DAY/GOALS/PLACE: Discharge pending placement  OTHER IMPORTANT INFO: Court hold. Court hearing 11/10 @0900 via phone

## 2020-11-09 NOTE — PROGRESS NOTES
SW:  Patient will have his Commitment hearing on Tuesday at 0900.  Both writer and patient have the phone number [phone] and access code [phone] to dial in for the hearing.

## 2020-11-09 NOTE — PLAN OF CARE
DATE & TIME: 11-9-2020 days    Cognitive Concerns/ Orientation : alert and oriented x 4   BEHAVIOR & AGGRESSION TOOL COLOR: green  CIWA SCORE: n/a   ABNL VS/O2: BP elevated got BP meds   MOBILITY:  independent   PAIN MANAGMENT:  denies   DIET:  Regular ate well.   BOWEL/BLADDER:  continent   ABNL LAB/BG:  creat 2.1  DRAIN/DEVICES: none  TELEMETRY RHYTHM: n/a  SKIN:  bruised   TESTS/PROCEDURES:  none   D/C DAY/GOALS/PLACE:  will have court hearing per phone tomorrow  OTHER IMPORTANT INFO:  pt states feeling depressed.

## 2020-11-09 NOTE — PROGRESS NOTES
[facility]    Medicine Progress Note - Hospitalist Service       Date of Admission:  10/14/2020  Date of Service: 11/09/2020    Assessment & Plan        Jim Richard is a 65 year old male was admitted on 10/14/2020 with PMH use disorder, cirrhosis, depression, recent hospitalization for intentional overdose, multiple hospitalization for intoxication who is being admitted again on 10/14/2020, due to continued drinking and suicidal ideation, stating that he would take all of his medications at once.  Recently hospitalized from 10/6 - 10/11 for alcohol intoxication with metabolic acidosis and C. difficile.  After discharge he states he stopped taking all of his medications and has been drinking because he does not care whether he lives or dies. Currently on a hold pending court hearing for commitment.      Depression, anxiety, suicidal ideation   Acute alcohol intoxication  Severe alcohol use disorder - multiple recent admission for intoxication at risk of life-threateningillness/death  Patient has been through treatment multiple times in the past. He states he stating drinking again right after discharge. BAL 0.36 at admission.    - CIWA stopped  - Psychiatry was consulted-> has initiated a commitment process due to his recidivism, inability to maintain sobriety, multiple admissions secondary to alcohol-related issues and expressing suicidal ideation while intoxicated.    - Psych saw on 11/06 and adjusted seroquel, remeron.   - Psych discontinued hydroxyzine but I still have it available as needed for sleep, follow up consult occurred 11/06, request follow up as needed   - Patient will have his Commitment hearing on Tuesday at 0900.     Alcoholic cirrhosis with history of esophageal varices and ascites   History of GI bleed.  Grade 1 esophageal varices with portal gastropathy. He c/o diffuse abdominal discomfort and has obvious fluid wave on exam with diffuse mild tenderness on exam.  Last paracentesis on 10/9 was negative for SBP and patient currently refusing paracentesis stating pain no different now than prior to that procedure. S/p paracentesis 10/19.   Plan:  - Continue PPI   - PTA Atenolol stopped  - Nadolol started 10/29/2020.  Hold parameters for heart rate less than 55.  - GI following and appreciate their recommendations.  Stopped diuretics     Bradycardia  Suspect due to initiation of Nadolol while also receiving Atenolol   - Hold parameters for Nadolol in place for heart rate less than 55  - Atenolol has been discontinued   - Continue to monitor     Mild hyponatremia  Sodium stable in the low 130s -> 134  - Continue to monitor      Lactic acidosis secondary to above  Hemodynamically stable with low suspicion for infection / sepsis.  Likely related to liver disease      C. difficile colitis  D iagnosed during his last admission CT A/P with circumferential wall thickening of rectum suggesting distal colitis/proctitis. procalcitonin low at 0.07. WBC 7.7. Afebrile.  Not taking vancomycin and currently having 2-3 loose stools a day on admission.  - Finished a course of oral vancomycin 125 mg every 6 hours    NGUYEN on CKD stage III  Cr 1.63 on admission , Baseline creatinine 1.4-1.9.  Cr had bumped up to 2.1 over the past week, likely due to diuretics  Plan:  - Continue to monitor  - Diuretics stopped      Chronic anemia, nl MCV, stable  Hemoglobins initially in the 10s.  Have drifted down slightly to 8-9     BPH  - Continue flomax     JANIS,   - home CPAP ordered     Malnutrition:    - Level of malnutrition: Non-Severe   - Based on: mild (or greater) subcutaneous fat loss, mild (or greater) muscle loss         Diet: Snacks/Supplements Adult: Other; Chocolate Shake + 2 pkts benepro w/ lunch and dinner (RD); With Meals  Regular Diet Adult    DVT Prophylaxis: Pneumatic Compression Devices  Leger Catheter: not present  Code Status: Full Code           Disposition Plan   Expected discharge: [place]LE  undergoing court evaluation for commitment  Entered: Chivo Murcia MD 11/09/2020, 4:41 PM       The patient's care was discussed with the Bedside Nurse, Care Coordinator/ and Patient.    Chivo Murcia MD  Hospitalist Service  [facility]  Contact information available via [facility] Paging/Directory    ______________________________________________________________________    Interval History      Patient seen and examined. No acute events over night.  No fevers or chills.  No pain at this time. No CP/SOB.  Abdomen with no significant pain today. Hearing tomorrow.    Data reviewed today: I reviewed all medications, new labs and imaging results over the last 24 hours. I personally reviewed no images or EKG's today.    Physical Exam   Vital Signs: Temp: 98.4  F (36.9  C) Temp src: Oral BP: (!) 145/69 Pulse: 52   Resp: 18 SpO2: 98 % O2 Device: None (Room air)    Weight: 189 lbs 6 oz     General Appearance: Ambulating without any difficulty.  NAD   Respiratory: Clear to auscultation.  No respiratory distress  Cardiovascular: RRR.  No obvious murmurs  GI: Bowel sounds present.  Non-tender.  Abdomen is mildly distended but soft.  Skin: No rashes.  No cyanosis  Other: No edema.  No calf tenderness    Psych: Calm and pleasant, oriented x3    Data   Recent Labs   Lab 11/09/20  0827 11/08/20  1155 11/07/20  1016 11/03/20  1021 11/03/20  1021    134 139   < > 130*   POTASSIUM 4.2 4.5 4.5   < > 3.9   CHLORIDE 111* 107 110*   < > 101   CO2 22 21 24   < > 24   BUN 42* 41* 40*   < > 32*   CR 2.10* 2.17* 2.05*   < > 1.85*   ANIONGAP 6 6 5   < > 5   KEV 8.5 8.4* 8.5   < > 8.4*   * 76 109*   < > 130*   ALBUMIN  --   --   --   --  2.8*    < > = values in this interval not displayed.     No results found for this or any previous visit (from the past 24 hour(s)).

## 2020-11-10 LAB
ANION GAP SERPL CALCULATED.3IONS-SCNC: 5 MMOL/L (ref 3–14)
BUN SERPL-MCNC: 43 MG/DL (ref 7–30)
CALCIUM SERPL-MCNC: 8.5 MG/DL (ref 8.5–10.1)
CHLORIDE SERPL-SCNC: 110 MMOL/L (ref 94–109)
CO2 SERPL-SCNC: 23 MMOL/L (ref 20–32)
CREAT SERPL-MCNC: 2.07 MG/DL (ref 0.66–1.25)
GFR SERPL CREATININE-BSD FRML MDRD: 32 ML/MIN/{1.73_M2}
GLUCOSE SERPL-MCNC: 78 MG/DL (ref 70–99)
POTASSIUM SERPL-SCNC: 4.5 MMOL/L (ref 3.4–5.3)
SODIUM SERPL-SCNC: 138 MMOL/L (ref 133–144)

## 2020-11-10 PROCEDURE — 250N000013 HC RX MED GY IP 250 OP 250 PS 637: Performed by: INTERNAL MEDICINE

## 2020-11-10 PROCEDURE — 36415 COLL VENOUS BLD VENIPUNCTURE: CPT | Performed by: INTERNAL MEDICINE

## 2020-11-10 PROCEDURE — 250N000013 HC RX MED GY IP 250 OP 250 PS 637: Performed by: PSYCHIATRY & NEUROLOGY

## 2020-11-10 PROCEDURE — 250N000013 HC RX MED GY IP 250 OP 250 PS 637: Performed by: STUDENT IN AN ORGANIZED HEALTH CARE EDUCATION/TRAINING PROGRAM

## 2020-11-10 PROCEDURE — 80048 BASIC METABOLIC PNL TOTAL CA: CPT | Performed by: INTERNAL MEDICINE

## 2020-11-10 PROCEDURE — 120N000001 HC R&B MED SURG/OB

## 2020-11-10 PROCEDURE — 99232 SBSQ HOSP IP/OBS MODERATE 35: CPT | Performed by: STUDENT IN AN ORGANIZED HEALTH CARE EDUCATION/TRAINING PROGRAM

## 2020-11-10 RX ORDER — FUROSEMIDE 20 MG/1
10 TABLET ORAL DAILY
Status: DISCONTINUED | OUTPATIENT
Start: 2020-11-10 | End: 2020-11-18 | Stop reason: HOSPADM

## 2020-11-10 RX ADMIN — CARBIDOPA AND LEVODOPA 7.5 MG: 50; 200 TABLET, EXTENDED RELEASE ORAL at 12:15

## 2020-11-10 RX ADMIN — PANTOPRAZOLE SODIUM 40 MG: 40 TABLET, DELAYED RELEASE ORAL at 20:25

## 2020-11-10 RX ADMIN — CARBIDOPA AND LEVODOPA 7.5 MG: 50; 200 TABLET, EXTENDED RELEASE ORAL at 08:02

## 2020-11-10 RX ADMIN — TAMSULOSIN HYDROCHLORIDE 0.4 MG: 0.4 CAPSULE ORAL at 08:02

## 2020-11-10 RX ADMIN — FUROSEMIDE 10 MG: 20 TABLET ORAL at 20:25

## 2020-11-10 RX ADMIN — QUETIAPINE FUMARATE 100 MG: 100 TABLET ORAL at 17:58

## 2020-11-10 RX ADMIN — FOLIC ACID 1 MG: 1 TABLET ORAL at 08:03

## 2020-11-10 RX ADMIN — MAGNESIUM OXIDE 400 MG: 400 TABLET ORAL at 08:02

## 2020-11-10 RX ADMIN — VORTIOXETINE 10 MG: 10 TABLET, FILM COATED ORAL at 08:02

## 2020-11-10 RX ADMIN — MIRTAZAPINE 15 MG: 15 TABLET, FILM COATED ORAL at 21:49

## 2020-11-10 RX ADMIN — PANTOPRAZOLE SODIUM 40 MG: 40 TABLET, DELAYED RELEASE ORAL at 08:02

## 2020-11-10 RX ADMIN — LIDOCAINE 1 PATCH: 560 PATCH PERCUTANEOUS; TOPICAL; TRANSDERMAL at 20:25

## 2020-11-10 RX ADMIN — CARBIDOPA AND LEVODOPA 7.5 MG: 50; 200 TABLET, EXTENDED RELEASE ORAL at 17:58

## 2020-11-10 RX ADMIN — QUETIAPINE FUMARATE 100 MG: 100 TABLET ORAL at 08:02

## 2020-11-10 RX ADMIN — MULTIPLE VITAMINS W/ MINERALS TAB 1 TABLET: TAB at 08:02

## 2020-11-10 RX ADMIN — HYDROXYZINE HYDROCHLORIDE 50 MG: 25 TABLET, FILM COATED ORAL at 21:49

## 2020-11-10 RX ADMIN — QUETIAPINE FUMARATE 200 MG: 100 TABLET ORAL at 21:49

## 2020-11-10 RX ADMIN — NICOTINE 1 PATCH: 21 PATCH, EXTENDED RELEASE TRANSDERMAL at 08:02

## 2020-11-10 ASSESSMENT — ACTIVITIES OF DAILY LIVING (ADL)
ADLS_ACUITY_SCORE: 11

## 2020-11-10 NOTE — PLAN OF CARE
DATE & TIME: 11/9/2020 4151-6608   Cognitive Concerns/ Orientation : A&Ox4.    BEHAVIOR & AGGRESSION TOOL COLOR: Green   ABNL VS/O2: Bradycardic, other VSS on RA  MOBILITY: Independent  PAIN MANAGMENT: Denies  DIET: Regular  BOWEL/BLADDER: Continent  ABNL LAB/BG: Cr 2.10  DRAIN/DEVICES: No IV access  SKIN: Bruised, scabs, bilateral lower extremity trace edema  D/C DAY/GOALS/PLACE: Discharge pending, undergoing court eval.  OTHER IMPORTANT INFO: Court hold. Court hearing today11/10 @0900 via phone. Lidocaine patch on L hip. Nicotine patch on L arm.

## 2020-11-10 NOTE — PLAN OF CARE
DATE & TIME: 11/9/2020 6487-7227   Cognitive Concerns/ Orientation : A&Ox4, flat today.    BEHAVIOR & AGGRESSION TOOL COLOR: Green   ABNL VS/O2: Bradycardic, other VSS on RA  MOBILITY: Independent  PAIN MANAGMENT: Denies  DIET: Regular  BOWEL/BLADDER: Continent  ABNL LAB/BG: Cr 2.10  DRAIN/DEVICES: No IV access  SKIN: Bruised, scabs, bilateral lower extremity trace edema  D/C DAY/GOALS/PLACE: Discharge pending placement, undergoing court eval.  OTHER IMPORTANT INFO: Court hold. Court hearing 11/10 @0900 via phone. Lidocaine patch on L hip. Nicotine patch on L arm.

## 2020-11-10 NOTE — PLAN OF CARE
DATE & TIME: 11/10/20 7233-7010  Cognitive Concerns/ Orientation : A&Ox4  BEHAVIOR & AGGRESSION TOOL COLOR: Green  CIWA SCORE: NA  ABNL VS/O2: VSS on RA, elevated /57  MOBILITY: IND  PAIN MANAGMENT: Denies  DIET: Reg  BOWEL/BLADDER: Up to BR  ABNL LAB/BG: Creat 2.07 UN 43 GFR 32, 37  DRAIN/DEVICES: No IV in place  TELEMETRY RHYTHM: NA  SKIN: Bruised, scabs, BLE trace edema  TESTS/PROCEDURES:   D/C DAY/GOALS/PLACE: Pending  OTHER IMPORTANT INFO: Court hold. Court hearing this morning @0900 via phone.  Nicotine patch on R arm

## 2020-11-11 LAB
ANION GAP SERPL CALCULATED.3IONS-SCNC: 6 MMOL/L (ref 3–14)
BUN SERPL-MCNC: 45 MG/DL (ref 7–30)
CALCIUM SERPL-MCNC: 8.6 MG/DL (ref 8.5–10.1)
CHLORIDE SERPL-SCNC: 109 MMOL/L (ref 94–109)
CO2 SERPL-SCNC: 22 MMOL/L (ref 20–32)
CREAT SERPL-MCNC: 2.04 MG/DL (ref 0.66–1.25)
GFR SERPL CREATININE-BSD FRML MDRD: 33 ML/MIN/{1.73_M2}
GLUCOSE SERPL-MCNC: 144 MG/DL (ref 70–99)
POTASSIUM SERPL-SCNC: 4.5 MMOL/L (ref 3.4–5.3)
SODIUM SERPL-SCNC: 137 MMOL/L (ref 133–144)

## 2020-11-11 PROCEDURE — 250N000013 HC RX MED GY IP 250 OP 250 PS 637: Performed by: INTERNAL MEDICINE

## 2020-11-11 PROCEDURE — 250N000013 HC RX MED GY IP 250 OP 250 PS 637: Performed by: STUDENT IN AN ORGANIZED HEALTH CARE EDUCATION/TRAINING PROGRAM

## 2020-11-11 PROCEDURE — 80048 BASIC METABOLIC PNL TOTAL CA: CPT | Performed by: INTERNAL MEDICINE

## 2020-11-11 PROCEDURE — 36415 COLL VENOUS BLD VENIPUNCTURE: CPT | Performed by: INTERNAL MEDICINE

## 2020-11-11 PROCEDURE — 250N000013 HC RX MED GY IP 250 OP 250 PS 637: Performed by: PSYCHIATRY & NEUROLOGY

## 2020-11-11 PROCEDURE — 120N000001 HC R&B MED SURG/OB

## 2020-11-11 PROCEDURE — 250N000013 HC RX MED GY IP 250 OP 250 PS 637: Performed by: HOSPITALIST

## 2020-11-11 RX ADMIN — HYDROXYZINE HYDROCHLORIDE 50 MG: 25 TABLET, FILM COATED ORAL at 21:56

## 2020-11-11 RX ADMIN — PANTOPRAZOLE SODIUM 40 MG: 40 TABLET, DELAYED RELEASE ORAL at 08:11

## 2020-11-11 RX ADMIN — PANTOPRAZOLE SODIUM 40 MG: 40 TABLET, DELAYED RELEASE ORAL at 21:56

## 2020-11-11 RX ADMIN — QUETIAPINE FUMARATE 100 MG: 100 TABLET ORAL at 17:05

## 2020-11-11 RX ADMIN — NADOLOL 40 MG: 40 TABLET ORAL at 08:11

## 2020-11-11 RX ADMIN — MULTIPLE VITAMINS W/ MINERALS TAB 1 TABLET: TAB at 08:11

## 2020-11-11 RX ADMIN — QUETIAPINE FUMARATE 100 MG: 100 TABLET ORAL at 08:11

## 2020-11-11 RX ADMIN — NICOTINE 1 PATCH: 21 PATCH, EXTENDED RELEASE TRANSDERMAL at 08:11

## 2020-11-11 RX ADMIN — MAGNESIUM OXIDE 400 MG: 400 TABLET ORAL at 08:11

## 2020-11-11 RX ADMIN — CARBIDOPA AND LEVODOPA 7.5 MG: 50; 200 TABLET, EXTENDED RELEASE ORAL at 08:11

## 2020-11-11 RX ADMIN — TAMSULOSIN HYDROCHLORIDE 0.4 MG: 0.4 CAPSULE ORAL at 08:11

## 2020-11-11 RX ADMIN — FUROSEMIDE 10 MG: 20 TABLET ORAL at 08:11

## 2020-11-11 RX ADMIN — QUETIAPINE FUMARATE 200 MG: 100 TABLET ORAL at 21:56

## 2020-11-11 RX ADMIN — FOLIC ACID 1 MG: 1 TABLET ORAL at 08:11

## 2020-11-11 RX ADMIN — CARBIDOPA AND LEVODOPA 7.5 MG: 50; 200 TABLET, EXTENDED RELEASE ORAL at 12:00

## 2020-11-11 RX ADMIN — LIDOCAINE 1 PATCH: 560 PATCH PERCUTANEOUS; TOPICAL; TRANSDERMAL at 21:56

## 2020-11-11 RX ADMIN — MIRTAZAPINE 15 MG: 15 TABLET, FILM COATED ORAL at 21:56

## 2020-11-11 RX ADMIN — VORTIOXETINE 10 MG: 10 TABLET, FILM COATED ORAL at 08:11

## 2020-11-11 RX ADMIN — CARBIDOPA AND LEVODOPA 7.5 MG: 50; 200 TABLET, EXTENDED RELEASE ORAL at 17:05

## 2020-11-11 ASSESSMENT — ACTIVITIES OF DAILY LIVING (ADL)
ADLS_ACUITY_SCORE: 11

## 2020-11-11 NOTE — PROGRESS NOTES
[facility]    Medicine Progress Note - Hospitalist Service       Date of Admission:  10/14/2020  Date of Service: 11/10/2020    Assessment & Plan        Jim Richard is a 65 year old male was admitted on 10/14/2020 with PMH use disorder, cirrhosis, depression, recent hospitalization for intentional overdose, multiple hospitalization for intoxication who is being admitted again on 10/14/2020, due to continued drinking and suicidal ideation, stating that he would take all of his medications at once.  Recently hospitalized from 10/6 - 10/11 for alcohol intoxication with metabolic acidosis and C. difficile.  After discharge he states he stopped taking all of his medications and has been drinking because he does not care whether he lives or dies. Currently on a hold pending court hearing for commitment.      Depression, anxiety, suicidal ideation   Acute alcohol intoxication  Severe alcohol use disorder - multiple recent admission for intoxication at risk of life-threateningillness/death  Patient has been through treatment multiple times in the past. He states he stating drinking again right after discharge. BAL 0.36 at admission.    - CIWA stopped  - Psychiatry was consulted-> has initiated a commitment process due to his recidivism, inability to maintain sobriety, multiple admissions secondary to alcohol-related issues and expressing suicidal ideation while intoxicated.    - Psych saw on 11/06 and adjusted seroquel, remeron.   - Psych discontinued hydroxyzine but I still have it available as needed for sleep, follow up consult occurred 11/06, request follow up as needed   - Patient had his Commitment hearing 11/10.     Alcoholic cirrhosis with history of esophageal varices and ascites   History of GI bleed.  Grade 1 esophageal varices with portal gastropathy. He c/o diffuse abdominal discomfort and has obvious fluid wave on exam with diffuse mild tenderness on exam. Last paracentesis on  10/9 was negative for SBP and patient currently refusing paracentesis stating pain no different now than prior to that procedure. S/p paracentesis 10/19.   Plan:  - Continue PPI   - PTA Atenolol stopped  - Nadolol started 10/29/2020.  Hold parameters for heart rate less than 55.  - GI following and appreciate their recommendations.    - Resume diuretics for now as LE edema worsening    Bradycardia  Suspect due to initiation of Nadolol while also receiving Atenolol   - Hold parameters for Nadolol in place for heart rate less than 55  - Atenolol has been discontinued   - Continue to monitor     Mild hyponatremia  Sodium stable in the low 130s -> 134  - Continue to monitor      Lactic acidosis secondary to above  Hemodynamically stable with low suspicion for infection / sepsis.  Likely related to liver disease      C. difficile colitis  D iagnosed during his last admission CT A/P with circumferential wall thickening of rectum suggesting distal colitis/proctitis. procalcitonin low at 0.07. WBC 7.7. Afebrile.  Not taking vancomycin and currently having 2-3 loose stools a day on admission.  - Finished a course of oral vancomycin 125 mg every 6 hours    NGUYEN on CKD stage III  Cr 1.63 on admission , Baseline creatinine 1.4-1.9.  Cr had bumped up to 2.1 over the past week, likely due to diuretics  Plan:  - Continue to monitor  - Diuretics stopped      Chronic anemia, nl MCV, stable  Hemoglobins initially in the 10s.  Have drifted down slightly to 8-9     BPH  - Continue flomax     JANIS,   - home CPAP ordered     Malnutrition:    - Level of malnutrition: Non-Severe   - Based on: mild (or greater) subcutaneous fat loss, mild (or greater) muscle loss         Diet: Snacks/Supplements Adult: Other; Chocolate Shake + 2 pkts benepro w/ lunch and dinner (RD); With Meals  Regular Diet Adult    DVT Prophylaxis: Pneumatic Compression Devices  Leger Catheter: not present  Code Status: Full Code           Disposition Plan   Expected  discharge: TBD, undergoing court evaluation for commitment  Entered: Chivo Murcia MD 11/10/2020, 6:44 PM       The patient's care was discussed with the Bedside Nurse, Care Coordinator/ and Patient.    Chivo Murcia MD  Hospitalist Service  [facility]  Contact information available via [facility] Paging/Directory    ______________________________________________________________________    Interval History      Patient seen and examined.   No acute events overnight.  No fevers or chills.  No pain at this time. No CP/SOB.  Abdomen with no significant pain today. Main complete is ongoing depression from being in hospital.     Data reviewed today: I reviewed all medications, new labs and imaging results over the last 24 hours. I personally reviewed no images or EKG's today.    Physical Exam   Vital Signs: Temp: 98.6  F (37  C) Temp src: Oral BP: (!) 147/57 Pulse: 58   Resp: 20 SpO2: 99 % O2 Device: None (Room air)    Weight: 182 lbs 0 oz     General Appearance: Ambulating without any difficulty.  NAD   Respiratory: Clear to auscultation.  No respiratory distress  Cardiovascular: RRR.  No obvious murmurs  GI: Bowel sounds present.  Non-tender.  Abdomen is mildly distended but soft.  Skin: No rashes.  No cyanosis  Other: No edema.  No calf tenderness    Psych: Calm and pleasant, oriented x3    Data   Recent Labs   Lab 11/10/20  1136 11/09/20  0827 11/08/20  1155    139 134   POTASSIUM 4.5 4.2 4.5   CHLORIDE 110* 111* 107   CO2 23 22 21   BUN 43* 42* 41*   CR 2.07* 2.10* 2.17*   ANIONGAP 5 6 6   KEV 8.5 8.5 8.4*   GLC 78 144* 76     No results found for this or any previous visit (from the past 24 hour(s)).

## 2020-11-11 NOTE — PLAN OF CARE
DATE & TIME: 11/11/2020 1900-0730  Cognitive Concerns/ Orientation : A&Ox4  BEHAVIOR & AGGRESSION TOOL COLOR: Green, pt seems more withdrawn   ABNL VS/O2: Bradycardic and slightly elevated BP  MOBILITY: Independent  PAIN MANAGMENT: L hip discomfort, lidocaine patch applied  DIET: Regular  BOWEL/BLADDER: Continent  ABNL LAB/BG: Crate 2.07  DRAIN/DEVICES: No IV access  SKIN: Bruised, scabs, bilateral lower extremity with edema, lasix given  D/C DAY/GOALS/PLACE: Discharge pending placement, undergoing court eval.  OTHER IMPORTANT INFO: had court hearing via phone yesterday. Lidocaine patch on L hip. Nicotine patch on L arm.

## 2020-11-11 NOTE — PROGRESS NOTES
[facility]    Hospitalist Progress Note    Assessment & Plan   Jim Richard is a 65 year old male was admitted on 10/14/2020 with PMH use disorder, cirrhosis, depression, recent hospitalization for intentional overdose, multiple hospitalization for intoxication who is being admitted again on 10/14/2020, due to continued drinking and suicidal ideation, stating that he would take all of his medications at once.  Recently hospitalized from 10/6 - 10/11 for alcohol intoxication with metabolic acidosis and C. difficile.  After discharge he states he stopped taking all of his medications and has been drinking because he does not care whether he lives or dies. Currently on a hold pending court hearing for commitment.      Depression, anxiety, suicidal ideation   Acute alcohol intoxication  Severe alcohol use disorder - multiple recent admission for intoxication at risk of life-threateningillness/death  Patient has been through treatment multiple times in the past. He states he stating drinking again right after discharge. BAL 0.36 at admission.    - CIWA stopped  - Psychiatry was consulted-> has initiated a commitment process due to his recidivism, inability to maintain sobriety, multiple admissions secondary to alcohol-related issues and expressing suicidal ideation while intoxicated.    - Psych saw on 11/06 and adjusted seroquel, remeron.   - Psych discontinued hydroxyzine but I still have it available as needed for sleep, follow up consult occurred 11/06, request follow up as needed   - Patient had his Commitment hearing 11/10.     Alcoholic cirrhosis with history of esophageal varices and ascites   History of GI bleed.  Grade 1 esophageal varices with portal gastropathy. He c/o diffuse abdominal discomfort and has obvious fluid wave on exam with diffuse mild tenderness on exam. Last paracentesis on 10/9 was negative for SBP and patient currently refusing paracentesis stating pain no  different now than prior to that procedure. S/p paracentesis 10/19.   No change in abd size. Restarted back on lasix low dose 11/9. Bmp stable  Alert/coherent.   Wt stable.   Plan:  - Continue PPI   - PTA Atenolol stopped  - Nadolol started 10/29/2020.  Hold parameters for heart rate less than 55.  - GI following and appreciate their recommendations.    - Resume diuretics for now as LE edema worsening  -Consider TIPS procedure in near future per [facility].   -outpatient follow up [facility].      Bradycardia  Suspect due to initiation of Nadolol while also receiving Atenolol   - Hold parameters for Nadolol in place for heart rate less than 55  - Atenolol has been discontinued   - Continue to monitor      Mild hyponatremia  Sodium stable in the low 130s -> 134  Resolved. Na wnl last 3 days  - Continue to monitor      Lactic acidosis secondary to above  Hemodynamically stable with low suspicion for infection / sepsis.  Likely related to liver disease      C. difficile colitis  D iagnosed during his last admission CT A/P with circumferential wall thickening of rectum suggesting distal colitis/proctitis. procalcitonin low at 0.07. WBC 7.7. Afebrile.  Not taking vancomycin and currently having 2-3 loose stools a day on admission.  - Finished a course of oral vancomycin 125 mg every 6 hours     NGUYEN on CKD stage III  Cr 1.63 on admission , Baseline creatinine 1.4-1.9.  Cr had bumped up to 2.1 over the past week, likely due to diuretics  Cr stable 2 range since end of October  K 4.5     Plan:  - Continue to monitor  - Diuretics stopped but lasix restarted 2 days ago.   -follow. Add aldactone back if Cr stable and K ok     Chronic anemia, nl MCV, stable  Hemoglobins initially in the 10s.  Have drifted down slightly to 8-9     BPH  - Continue flomax     JANIS,   - home CPAP ordered     Malnutrition:    - Level of malnutrition: Non-Severe   - Based on: mild (or greater) subcutaneous fat loss, mild (or greater) muscle  loss              Diet: Snacks/Supplements Adult: Other; Chocolate Shake + 2 pkts benepro w/ lunch and dinner (RD); With Meals  Regular Diet Adult    DVT Prophylaxis: Pneumatic Compression Devices  Leger Catheter: not present  Code Status: Full Code               Disposition Plan-     Expected discharge: TBD, undergoing court evaluation for commitment    Hasmukh Osullivan MD  Text Page  (7am to 6pm)  Interval History   Stable overnight. No new issues.   Had court hearing by phone yesterday.   No n/v/d/abd pain. No cp or sob. Taking po fine    -Data reviewed today: I reviewed all new labs and imaging results over the last 24 hours. I personally reviewed labs from last 24 hours      Physical Exam   Temp: 98.4  F (36.9  C) Temp src: Oral BP: 132/78 Pulse: 58   Resp: 18 SpO2: 97 % O2 Device: None (Room air)    Vitals:    11/09/20 0202 11/10/20 0700 11/11/20 0625   Weight: 85.9 kg (189 lb 6 oz) 82.6 kg (182 lb) 84.5 kg (186 lb 3.2 oz)     Vital Signs with Ranges  Temp:  [98.4  F (36.9  C)-98.7  F (37.1  C)] 98.4  F (36.9  C)  Pulse:  [53-58] 58  Resp:  [18-20] 18  BP: (132-155)/(57-78) 132/78  SpO2:  [97 %-99 %] 97 %  I/O last 3 completed shifts:  In: 720 [P.O.:720]  Out: -     Constitutional: Up in chair eating. nad  Respiratory: CTAB  Cardiovascular: RRR no r/g/m  GI: soft, nt, nd  Skin/Integumen: no rash or edema  Psych/Neuro: pleasant, cooperative. Nl speech and mentation      Medications       folic acid  1 mg Oral Daily     furosemide  10 mg Oral Daily     lidocaine  1 patch Transdermal Q24h    And     lidocaine   Transdermal Q8H     magnesium oxide  400 mg Oral Daily     midodrine  7.5 mg Oral TID w/meals     mirtazapine  15 mg Oral At Bedtime     multivitamin w/minerals  1 tablet Oral Daily     nadolol  40 mg Oral Daily     nicotine  1 patch Transdermal Daily     nicotine   Transdermal Q8H     pantoprazole  40 mg Oral BID     QUEtiapine  100 mg Oral BID w/meals     QUEtiapine  200 mg Oral At Bedtime      tamsulosin  0.4 mg Oral Daily     vortioxetine  10 mg Oral Daily       Data   Recent Labs   Lab 11/11/20  0856 11/10/20  1136 11/09/20  0827    138 139   POTASSIUM 4.5 4.5 4.2   CHLORIDE 109 110* 111*   CO2 22 23 22   BUN 45* 43* 42*   CR 2.04* 2.07* 2.10*   ANIONGAP 6 5 6   KEV 8.6 8.5 8.5   * 78 144*       Imaging:   No results found for this or any previous visit (from the past 24 hour(s)).

## 2020-11-11 NOTE — PROGRESS NOTES
Care Management Follow Up    Length of Stay (days): 27    Expected Discharge Date: (possibly later in the week of 11/8)     Concerns to be Addressed: (patient would like to move into an [facility] in [place])  (The [facility] is recommending a commitment to [facility] )  Patient plan of care discussed at interdisciplinary rounds: Yes    Anticipated Discharge Disposition: ([facility] will provide)     Anticipated Discharge Services:    Anticipated Discharge DME:      Patient/family educated on Medicare website which has current facility and service quality ratings:    Education Provided on the Discharge Plan:    Patient/Family in Agreement with the Plan: (patient doesn't agree with the [facility] recommendation.) Patient is willing to attend out patient CD but does not agree with a residential placement.  The commitment  has made a referral to [facility] in [place] which is a long term program.    Referrals Placed by CM/SW:    Private pay costs discussed: costs are yet to be determined.    Additional Information:  Patient had his commitment hearing today.  Patient was told the [facility] offices are closed tomorrow and thus it will be at least Thursday before the  renders a decision.  Writer contacted the commitment  asking for an update before the end of the business day.  Writer did not get a call back.        TANA CejaSW

## 2020-11-12 LAB
ANION GAP SERPL CALCULATED.3IONS-SCNC: 8 MMOL/L (ref 3–14)
BUN SERPL-MCNC: 47 MG/DL (ref 7–30)
CALCIUM SERPL-MCNC: 8.4 MG/DL (ref 8.5–10.1)
CHLORIDE SERPL-SCNC: 111 MMOL/L (ref 94–109)
CO2 SERPL-SCNC: 20 MMOL/L (ref 20–32)
CREAT SERPL-MCNC: 2.03 MG/DL (ref 0.66–1.25)
GFR SERPL CREATININE-BSD FRML MDRD: 33 ML/MIN/{1.73_M2}
GLUCOSE SERPL-MCNC: 121 MG/DL (ref 70–99)
POTASSIUM SERPL-SCNC: 4.3 MMOL/L (ref 3.4–5.3)
SODIUM SERPL-SCNC: 139 MMOL/L (ref 133–144)

## 2020-11-12 PROCEDURE — 250N000013 HC RX MED GY IP 250 OP 250 PS 637: Performed by: STUDENT IN AN ORGANIZED HEALTH CARE EDUCATION/TRAINING PROGRAM

## 2020-11-12 PROCEDURE — 250N000013 HC RX MED GY IP 250 OP 250 PS 637: Performed by: INTERNAL MEDICINE

## 2020-11-12 PROCEDURE — 80048 BASIC METABOLIC PNL TOTAL CA: CPT | Performed by: INTERNAL MEDICINE

## 2020-11-12 PROCEDURE — 99231 SBSQ HOSP IP/OBS SF/LOW 25: CPT | Performed by: INTERNAL MEDICINE

## 2020-11-12 PROCEDURE — 250N000013 HC RX MED GY IP 250 OP 250 PS 637: Performed by: PSYCHIATRY & NEUROLOGY

## 2020-11-12 PROCEDURE — 36415 COLL VENOUS BLD VENIPUNCTURE: CPT | Performed by: INTERNAL MEDICINE

## 2020-11-12 PROCEDURE — 120N000001 HC R&B MED SURG/OB

## 2020-11-12 PROCEDURE — 250N000013 HC RX MED GY IP 250 OP 250 PS 637: Performed by: HOSPITALIST

## 2020-11-12 RX ORDER — QUETIAPINE FUMARATE 50 MG/1
50 TABLET, FILM COATED ORAL DAILY PRN
Status: DISCONTINUED | OUTPATIENT
Start: 2020-11-12 | End: 2020-11-18 | Stop reason: HOSPADM

## 2020-11-12 RX ADMIN — NADOLOL 40 MG: 40 TABLET ORAL at 08:39

## 2020-11-12 RX ADMIN — MULTIPLE VITAMINS W/ MINERALS TAB 1 TABLET: TAB at 08:39

## 2020-11-12 RX ADMIN — CARBIDOPA AND LEVODOPA 7.5 MG: 50; 200 TABLET, EXTENDED RELEASE ORAL at 11:29

## 2020-11-12 RX ADMIN — VORTIOXETINE 10 MG: 10 TABLET, FILM COATED ORAL at 08:39

## 2020-11-12 RX ADMIN — LIDOCAINE 1 PATCH: 560 PATCH PERCUTANEOUS; TOPICAL; TRANSDERMAL at 21:01

## 2020-11-12 RX ADMIN — HYDROXYZINE HYDROCHLORIDE 50 MG: 25 TABLET, FILM COATED ORAL at 21:09

## 2020-11-12 RX ADMIN — QUETIAPINE FUMARATE 100 MG: 100 TABLET ORAL at 17:09

## 2020-11-12 RX ADMIN — PANTOPRAZOLE SODIUM 40 MG: 40 TABLET, DELAYED RELEASE ORAL at 21:00

## 2020-11-12 RX ADMIN — CARBIDOPA AND LEVODOPA 7.5 MG: 50; 200 TABLET, EXTENDED RELEASE ORAL at 08:38

## 2020-11-12 RX ADMIN — MAGNESIUM OXIDE 400 MG: 400 TABLET ORAL at 08:39

## 2020-11-12 RX ADMIN — CARBIDOPA AND LEVODOPA 7.5 MG: 50; 200 TABLET, EXTENDED RELEASE ORAL at 17:08

## 2020-11-12 RX ADMIN — FOLIC ACID 1 MG: 1 TABLET ORAL at 08:38

## 2020-11-12 RX ADMIN — TAMSULOSIN HYDROCHLORIDE 0.4 MG: 0.4 CAPSULE ORAL at 08:38

## 2020-11-12 RX ADMIN — QUETIAPINE FUMARATE 200 MG: 100 TABLET ORAL at 21:09

## 2020-11-12 RX ADMIN — PANTOPRAZOLE SODIUM 40 MG: 40 TABLET, DELAYED RELEASE ORAL at 08:39

## 2020-11-12 RX ADMIN — NICOTINE 1 PATCH: 21 PATCH, EXTENDED RELEASE TRANSDERMAL at 08:39

## 2020-11-12 RX ADMIN — QUETIAPINE FUMARATE 100 MG: 100 TABLET ORAL at 08:39

## 2020-11-12 RX ADMIN — MIRTAZAPINE 15 MG: 15 TABLET, FILM COATED ORAL at 21:00

## 2020-11-12 RX ADMIN — QUETIAPINE FUMARATE 50 MG: 50 TABLET ORAL at 12:35

## 2020-11-12 RX ADMIN — FUROSEMIDE 10 MG: 20 TABLET ORAL at 08:39

## 2020-11-12 ASSESSMENT — ACTIVITIES OF DAILY LIVING (ADL)
ADLS_ACUITY_SCORE: 11

## 2020-11-12 NOTE — PROGRESS NOTES
[facility]    Medicine Progress Note - Hospitalist Service       Date of Admission:  10/14/2020  Assessment & Plan       Jim Richard is a 65 year old male was admitted on 10/14/2020 with PMH use disorder, cirrhosis, depression, recent hospitalization for intentional overdose, multiple hospitalization for intoxication who is being admitted again on 10/14/2020, due to continued drinking and suicidal ideation, stating that he would take all of his medications at once.  Recently hospitalized from 10/6 - 10/11 for alcohol intoxication with metabolic acidosis and C. difficile.  After discharge he states he stopped taking all of his medications and has been drinking because he does not care whether he lives or dies. Currently on a hold pending court hearing for commitment.      Depression, anxiety, suicidal ideation   Acute alcohol intoxication  Severe alcohol use disorder - multiple recent admission for intoxication at risk of life-threateningillness/death  Patient has been through treatment multiple times in the past. He states he stating drinking again right after discharge. BAL 0.36 at admission.    - CIWA stopped  - Psychiatry was consulted-> has initiated a commitment process due to his recidivism, inability to maintain sobriety, multiple admissions secondary to alcohol-related issues and expressing suicidal ideation while intoxicated  - Added daily PRN Seroquel 50 mg   - Psych saw on 11/06 and adjusted seroquel, remeron  - Psych discontinued hydroxyzine but I still have it available as needed for sleep, follow up consult occurred 11/06, request follow up as needed   - Social work following for commitment process     Alcoholic cirrhosis with history of esophageal varices and ascites   History of GI bleed.  Grade 1 esophageal varices with portal gastropathy. He c/o diffuse abdominal discomfort and has obvious fluid wave on exam with diffuse mild tenderness on exam. Last paracentesis on  10/9 was negative for SBP and patient currently refusing paracentesis stating pain no different now than prior to that procedure. S/p paracentesis 10/19.   - Restarted back on lasix low dose 11/9  - Continue PPI   - PTA Atenolol stopped  - Nadolol started 10/29/2020.  Hold parameters for heart rate less than 55.  - Resume diuretics for now as LE edema worsening  - GI had been following and appreciate their recommendations.  Outpatient follow up [facility].  Can consider TIPS in the future      Bradycardia  Suspect due to beta blocker use   - Hold parameters for Nadolol in place for heart rate less than 55  - Atenolol has been discontinued   - Continue to monitor      Mild hyponatremia  Sodium stable in the low 130s -> 134  Resolved. Na wnl last 3 days  - Continue to monitor      Lactic acidosis secondary to above  Hemodynamically stable with low suspicion for infection / sepsis.  Likely related to liver disease      C. difficile colitis. Resolved   Diagnosed during his last admission CT A/P with circumferential wall thickening of rectum suggesting distal colitis/proctitis. procalcitonin low at 0.07. WBC 7.7. Afebrile.  Not taking vancomycin and currently having 2-3 loose stools a day on admission.  - Finished a course of oral vancomycin 125 mg every 6 hours     NGUYEN on CKD stage III. Resolved   Cr 1.63 on admission , Baseline creatinine 1.4-1.9.  Cr had bumped up to 2.1 over the past week, likely due to diuretics    Chronic anemia, nl MCV, stable  Hemoglobins initially in the 10s.  Have drifted down slightly to 8-9     BPH  - Continue flomax     JANIS,   - Home CPAP ordered     Malnutrition:    - Level of malnutrition: Non-Severe   - Based on: mild (or greater) subcutaneous fat loss, mild (or greater) muscle loss       Diet: Snacks/Supplements Adult: Other; Chocolate Shake + 2 pkts benepro w/ lunch and dinner (RD); With Meals  Regular Diet Adult    DVT Prophylaxis: Pneumatic Compression Devices  Leger Catheter: not  present  Code Status: Full Code           Disposition Plan   Expected discharge: TBD, still undergoing commitment process  Entered: Godfrey Vasques DO 11/12/2020, 11:43 AM       The patient's care was discussed with the Care Coordinator/ and Patient.    Godfrey Vasques DO  Hospitalist Service  [facility]  Contact information available via [facility] Paging/Directory    ______________________________________________________________________    Interval History   Patient seen and examined.  No acute events over night.  Having some increased anxiety/depression due to prolonged hospitalization.  No fevers or chills.  No pain or trouble breathing at this time.     Data reviewed today: I reviewed all medications, new labs and imaging results over the last 24 hours. I personally reviewed no images or EKG's today.    Physical Exam   Vital Signs: Temp: 98.6  F (37  C) Temp src: Oral BP: 136/62 Pulse: 51   Resp: 18 SpO2: 97 % O2 Device: None (Room air)    Weight: 184 lbs 15.46 oz  General Appearance: Resting comfortably in chair.  NAD   Respiratory: Clear to auscultation.  No respiratory distress  Cardiovascular: RRR.  No obvious murmusr  GI: Bowel sounds present.  Non-tender.  Minimally distended  Skin: No obvious rashes or cyanosis to exposed skin  Other: No edema.  No calf tenderness      Data   Recent Labs   Lab 11/12/20  1048 11/11/20  0856 11/10/20  1136    137 138   POTASSIUM 4.3 4.5 4.5   CHLORIDE 111* 109 110*   CO2 20 22 23   BUN 47* 45* 43*   CR 2.03* 2.04* 2.07*   ANIONGAP 8 6 5   KEV 8.4* 8.6 8.5   * 144* 78     No results found for this or any previous visit (from the past 24 hour(s)).

## 2020-11-12 NOTE — PLAN OF CARE
DATE & TIME: 11/12/2020 1900-0730  Cognitive Concerns/ Orientation : A&Ox4  BEHAVIOR & AGGRESSION TOOL COLOR: Green  ABNL VS/O2: Bradycardic and slightly elevated BP o/w VSS; O2sats 90's RA  MOBILITY: Independent  PAIN MANAGMENT: Left hip discomfort - lidocaine patch on  DIET: Regular - appetite is good  BOWEL/BLADDER: Continent  ABNL LAB/BG: Cr 2.04  DRAIN/DEVICES: No IV access  SKIN: Bruised, scabs, bilateral lower extremity with 1+ edema - on lasix  D/C DAY/GOALS/PLACE: TBD, undergoing court evaluation for commitment - SW following  OTHER IMPORTANT INFO:

## 2020-11-12 NOTE — PROGRESS NOTES
Care Management Follow Up    Length of Stay (days): 29    Expected Discharge Date: (possibly later in the week of 11/8)     Concerns to be Addressed: (patient would like to move into an [facility] in [place])  (The [facility] is recommending a commitment to [place] )  Patient plan of care discussed at interdisciplinary rounds: Yes    Anticipated Discharge Disposition: ([facility] will provide)     Anticipated Discharge Services:    Anticipated Discharge DME:      Patient/family educated on Medicare website which has current facility and service quality ratings:    Education Provided on the Discharge Plan:    Patient/Family in Agreement with the Plan: (patient doesn't agree with the [facility] recommendation.)    Referrals Placed by CM/SW:    Private pay coosts discussed:     Additional Information:  Spoke to  Omayra who is still awaiting outcome of the hearing earlier this week. She is still recommending [facility].  Omayra will be in touch when more information was available.       Niesha Lynn, TANASW

## 2020-11-12 NOTE — PLAN OF CARE
DATE & TIME: 11/11/2020 0060-4522  Cognitive Concerns/ Orientation : A&Ox4  BEHAVIOR & AGGRESSION TOOL COLOR: Green  ABNL VS/O2: Bradycardic and slightly elevated BP o/w VSS; O2sats 90's RA  MOBILITY: Independent  PAIN MANAGMENT: Left hip discomfort - lidocaine patch off at this time  DIET: Regular - appetite is good  BOWEL/BLADDER: Continent  ABNL LAB/BG: Cr 2.04  DRAIN/DEVICES: No IV access  SKIN: Bruised, scabs, bilateral lower extremity with 1+ edema - on lasix  D/C DAY/GOALS/PLACE: TBD, undergoing court evaluation for commitment - SW following  OTHER IMPORTANT INFO: Patient had court hearing via phone yesterday.

## 2020-11-13 LAB
ANION GAP SERPL CALCULATED.3IONS-SCNC: 6 MMOL/L (ref 3–14)
BUN SERPL-MCNC: 48 MG/DL (ref 7–30)
CALCIUM SERPL-MCNC: 8.4 MG/DL (ref 8.5–10.1)
CHLORIDE SERPL-SCNC: 109 MMOL/L (ref 94–109)
CO2 SERPL-SCNC: 22 MMOL/L (ref 20–32)
CREAT SERPL-MCNC: 2.08 MG/DL (ref 0.66–1.25)
GFR SERPL CREATININE-BSD FRML MDRD: 32 ML/MIN/{1.73_M2}
GLUCOSE SERPL-MCNC: 73 MG/DL (ref 70–99)
POTASSIUM SERPL-SCNC: 4.6 MMOL/L (ref 3.4–5.3)
SODIUM SERPL-SCNC: 137 MMOL/L (ref 133–144)

## 2020-11-13 PROCEDURE — 36415 COLL VENOUS BLD VENIPUNCTURE: CPT | Performed by: INTERNAL MEDICINE

## 2020-11-13 PROCEDURE — 250N000013 HC RX MED GY IP 250 OP 250 PS 637: Performed by: INTERNAL MEDICINE

## 2020-11-13 PROCEDURE — 250N000013 HC RX MED GY IP 250 OP 250 PS 637: Performed by: STUDENT IN AN ORGANIZED HEALTH CARE EDUCATION/TRAINING PROGRAM

## 2020-11-13 PROCEDURE — 99231 SBSQ HOSP IP/OBS SF/LOW 25: CPT | Performed by: INTERNAL MEDICINE

## 2020-11-13 PROCEDURE — 120N000001 HC R&B MED SURG/OB

## 2020-11-13 PROCEDURE — 80048 BASIC METABOLIC PNL TOTAL CA: CPT | Performed by: INTERNAL MEDICINE

## 2020-11-13 PROCEDURE — 250N000013 HC RX MED GY IP 250 OP 250 PS 637: Performed by: PSYCHIATRY & NEUROLOGY

## 2020-11-13 RX ADMIN — VORTIOXETINE 10 MG: 10 TABLET, FILM COATED ORAL at 09:46

## 2020-11-13 RX ADMIN — PANTOPRAZOLE SODIUM 40 MG: 40 TABLET, DELAYED RELEASE ORAL at 21:49

## 2020-11-13 RX ADMIN — QUETIAPINE FUMARATE 100 MG: 100 TABLET ORAL at 17:45

## 2020-11-13 RX ADMIN — MIRTAZAPINE 15 MG: 15 TABLET, FILM COATED ORAL at 21:49

## 2020-11-13 RX ADMIN — HYDROXYZINE HYDROCHLORIDE 50 MG: 25 TABLET, FILM COATED ORAL at 21:52

## 2020-11-13 RX ADMIN — CARBIDOPA AND LEVODOPA 7.5 MG: 50; 200 TABLET, EXTENDED RELEASE ORAL at 17:45

## 2020-11-13 RX ADMIN — FOLIC ACID 1 MG: 1 TABLET ORAL at 09:46

## 2020-11-13 RX ADMIN — CARBIDOPA AND LEVODOPA 7.5 MG: 50; 200 TABLET, EXTENDED RELEASE ORAL at 09:45

## 2020-11-13 RX ADMIN — MULTIPLE VITAMINS W/ MINERALS TAB 1 TABLET: TAB at 09:46

## 2020-11-13 RX ADMIN — MAGNESIUM OXIDE 400 MG: 400 TABLET ORAL at 09:46

## 2020-11-13 RX ADMIN — TAMSULOSIN HYDROCHLORIDE 0.4 MG: 0.4 CAPSULE ORAL at 09:46

## 2020-11-13 RX ADMIN — QUETIAPINE FUMARATE 50 MG: 50 TABLET ORAL at 14:02

## 2020-11-13 RX ADMIN — NICOTINE 1 PATCH: 21 PATCH, EXTENDED RELEASE TRANSDERMAL at 09:50

## 2020-11-13 RX ADMIN — CARBIDOPA AND LEVODOPA 7.5 MG: 50; 200 TABLET, EXTENDED RELEASE ORAL at 12:55

## 2020-11-13 RX ADMIN — QUETIAPINE FUMARATE 200 MG: 100 TABLET ORAL at 21:50

## 2020-11-13 RX ADMIN — QUETIAPINE FUMARATE 100 MG: 100 TABLET ORAL at 09:45

## 2020-11-13 RX ADMIN — PANTOPRAZOLE SODIUM 40 MG: 40 TABLET, DELAYED RELEASE ORAL at 09:45

## 2020-11-13 RX ADMIN — FUROSEMIDE 10 MG: 20 TABLET ORAL at 09:45

## 2020-11-13 ASSESSMENT — ACTIVITIES OF DAILY LIVING (ADL)
ADLS_ACUITY_SCORE: 11

## 2020-11-13 NOTE — PLAN OF CARE
DATE & TIME: 11/12/2020 0587-7084  Cognitive Concerns/ Orientation : A&Ox4  BEHAVIOR & AGGRESSION TOOL COLOR: Green  ABNL VS/O2: Bradycardic o/w VSS; O2sats 90's RA  MOBILITY: Independent  PAIN MANAGMENT: Left hip discomfort - uses lidocaine patch (off at this time)  DIET: Regular - appetite is good  BOWEL/BLADDER: Continent  ABNL LAB/BG: Cr 2.03  DRAIN/DEVICES: No IV access  SKIN: Bruised, scabs, bilateral lower extremity with 1+ edema - on lasix  D/C DAY/GOALS/PLACE: TBD, undergoing commitment process; Count includes the [facility] is recommending [facility] following  OTHER IMPORTANT INFO:  Patient reports increasing anxiety d/t prolonged hospital stay - seroquel prn added.

## 2020-11-13 NOTE — PROGRESS NOTES
"Care Management Follow Up    Length of Stay (days): 30    Expected Discharge Date: (Commitment, await placement)     Concerns to be Addressed: discharge planning/commitment process  Patient plan of care discussed at interdisciplinary rounds: Yes    Anticipated Discharge Disposition:  CD treatment    Anticipated Discharge Services:  CD treatment  Anticipated Discharge DME:  none    Patient/family educated on Medicare website which has current facility and service quality ratings:  n/a  Education Provided on the Discharge Plan:  yes  Patient/Family in Agreement with the Plan: (patient doesn't agree with the [facility] recommendation.)    Referrals Placed by CM/SW:    Private pay costs discussed: Not applicable    Additional Information:  SW was approached by pt today asking for an updated. Explained nothing has been heard from the [facility] yet but the plan remained to send pt to CD treatment. Pt stated \"don't bother\" he wasn't going to go. SW asked what he agreed to with the  and he couldn't remember. SW explained it would be in his best interest to remain agreeable with the plan with the .      JULIA John, [facility]  [phone]  [facility]        "

## 2020-11-13 NOTE — PROGRESS NOTES
[facility]    Medicine Progress Note - Hospitalist Service       Date of Admission:  10/14/2020  Assessment & Plan       Jim Richard is a 65 year old male was admitted on 10/14/2020 with PMH use disorder, cirrhosis, depression, recent hospitalization for intentional overdose, multiple hospitalization for intoxication who is being admitted again on 10/14/2020, due to continued drinking and suicidal ideation, stating that he would take all of his medications at once.  Recently hospitalized from 10/6 - 10/11 for alcohol intoxication with metabolic acidosis and C. difficile.  After discharge he states he stopped taking all of his medications and has been drinking because he does not care whether he lives or dies. Currently on a hold pending court hearing for commitment.      Depression, anxiety, suicidal ideation   Acute alcohol intoxication  Severe alcohol use disorder - multiple recent admission for intoxication at risk of life-threateningillness/death  Patient has been through treatment multiple times in the past. He states he stating drinking again right after discharge. BAL 0.36 at admission.    - CIWA stopped  - Psychiatry was consulted-> has initiated a commitment process due to his recidivism, inability to maintain sobriety, multiple admissions secondary to alcohol-related issues and expressing suicidal ideation while intoxicated  - Added daily PRN Seroquel 50 mg   - Psych saw on 11/06 and adjusted seroquel, remeron  - Psych discontinued hydroxyzine but I still have it available as needed for sleep, follow up consult occurred 11/06, request follow up as needed   - Social work following for commitment process     Alcoholic cirrhosis with history of esophageal varices and ascites   History of GI bleed.  Grade 1 esophageal varices with portal gastropathy. He c/o diffuse abdominal discomfort and has obvious fluid wave on exam with diffuse mild tenderness on exam. Last paracentesis on  10/9 was negative for SBP and patient currently refusing paracentesis stating pain no different now than prior to that procedure. S/p paracentesis 10/19.   - Restarted back on lasix low dose 11/9  - Continue PPI   - PTA Atenolol stopped  - Nadolol started 10/29/2020.  Hold parameters for heart rate less than 55.  - Resume diuretics for now as LE edema worsening  - GI had been following and appreciate their recommendations.  Outpatient follow up [facility].  Can consider TIPS in the future      Bradycardia  Suspect due to beta blocker use   - Hold parameters for Nadolol in place for heart rate less than 55  - Atenolol has been discontinued   - Continue to monitor      Mild hyponatremia  Sodium stable in the low 130s -> 134  Resolved. Na wnl last 3 days  - Continue to monitor      Lactic acidosis secondary to above  Hemodynamically stable with low suspicion for infection / sepsis.  Likely related to liver disease      C. difficile colitis. Resolved   Diagnosed during his last admission CT A/P with circumferential wall thickening of rectum suggesting distal colitis/proctitis. procalcitonin low at 0.07. WBC 7.7. Afebrile.  Not taking vancomycin and currently having 2-3 loose stools a day on admission.  - Finished a course of oral vancomycin 125 mg every 6 hours     NGUYEN on CKD stage III. Resolved   Cr 1.63 on admission , Baseline creatinine 1.4-1.9.  Cr had bumped up to 2.1 over the past week, likely due to diuretics    Chronic anemia, nl MCV, stable  Hemoglobins initially in the 10s.  Have drifted down slightly to 8-9     BPH  - Continue flomax     JANIS,   - Home CPAP ordered     Malnutrition:    - Level of malnutrition: Non-Severe   - Based on: mild (or greater) subcutaneous fat loss, mild (or greater) muscle loss       Diet: Snacks/Supplements Adult: Other; Chocolate Shake + 2 pkts benepro w/ lunch and dinner (RD); With Meals  Regular Diet Adult    DVT Prophylaxis: Pneumatic Compression Devices  Leger Catheter: not  present  Code Status: Full Code           Disposition Plan   Expected discharge: TBD, awaiting commitment process  Entered: Godfrey Vasques DO 11/13/2020, 11:11 AM       The patient's care was discussed with the Care Coordinator/ and Patient.    Godfrey Vasques DO  Hospitalist Service  [facility]  Contact information available via [facility] Paging/Directory    ______________________________________________________________________    Interval History   Patient seen and examined.  No acute events over night.  Feels better with PRN Seroquel addition.  No fevers.  No pain at this time.  No difficulty breathing.    Data reviewed today: I reviewed all medications, new labs and imaging results over the last 24 hours. I personally reviewed no images or EKG's today.    Physical Exam   Vital Signs: Temp: 98.3  F (36.8  C) Temp src: Axillary BP: 108/63 Pulse: (!) 48   Resp: 20 SpO2: 95 % O2 Device: None (Room air)    Weight: 184 lbs 15.46 oz  General Appearance: Resting comfortably in chair.  NAD   Respiratory: Clear to auscultation.  No respiratory distress  Cardiovascular: RRR.  No obvious murmurs  GI: Bowel sounds noted.  Non-tender.  Minimally distended  Skin: No obvious rashes or cyanosis   Other: No edema.  No calf tenderness     Data   Recent Labs   Lab 11/13/20  1058 11/12/20  1048 11/11/20  0856    139 137   POTASSIUM 4.6 4.3 4.5   CHLORIDE 109 111* 109   CO2 22 20 22   BUN 48* 47* 45*   CR 2.08* 2.03* 2.04*   ANIONGAP 6 8 6   KEV 8.4* 8.4* 8.6   GLC 73 121* 144*     No results found for this or any previous visit (from the past 24 hour(s)).

## 2020-11-13 NOTE — PLAN OF CARE
DATE & TIME: 11/12/2020 Night shift  Cognitive Concerns/ Orientation : A&Ox4  BEHAVIOR & AGGRESSION TOOL COLOR: Green  ABNL VS/O2: Bradycardic o/w VSS  MOBILITY: Independent  PAIN MANAGMENT: Left hip discomfort - lidocaine patch in place   DIET: Regular - appetite is good  BOWEL/BLADDER: Continent  ABNL LAB/BG: Cr 2.03  DRAIN/DEVICES: No IV access  SKIN: Bruised, scabs, bilateral lower extremity with 1+ edema - on lasix  D/C DAY/GOALS/PLACE: TBD, undergoing commitment process; [facility] is recommending [facility]  following  OTHER IMPORTANT INFO:  NA

## 2020-11-13 NOTE — PLAN OF CARE
DATE & TIME: 11/13/2020 2842-7325  Cognitive Concerns/ Orientation : A&Ox4  BEHAVIOR & AGGRESSION TOOL COLOR: Green  ABNL VS/O2: VSS except Bradycardic with HR-40s.  MOBILITY: Independent  PAIN MANAGMENT: Scheduled lidocaine patch    DIET: Regular - appetite is good  BOWEL/BLADDER: Continent  ABNL LAB/BG: Cr 2.08, BUN-48  DRAIN/DEVICES: No IV access  SKIN: Bruised, scabs, bilateral lower extremity with 1+ edema-on lasix  D/C DAY/GOALS/PLACE: TBD, undergoing commitment process; [facility] is recommending [facility] following  OTHER IMPORTANT INFO:  NA

## 2020-11-14 LAB
ANION GAP SERPL CALCULATED.3IONS-SCNC: 8 MMOL/L (ref 3–14)
BUN SERPL-MCNC: 45 MG/DL (ref 7–30)
CALCIUM SERPL-MCNC: 8.6 MG/DL (ref 8.5–10.1)
CHLORIDE SERPL-SCNC: 111 MMOL/L (ref 94–109)
CO2 SERPL-SCNC: 20 MMOL/L (ref 20–32)
CREAT SERPL-MCNC: 2.02 MG/DL (ref 0.66–1.25)
GFR SERPL CREATININE-BSD FRML MDRD: 33 ML/MIN/{1.73_M2}
GLUCOSE SERPL-MCNC: 111 MG/DL (ref 70–99)
POTASSIUM SERPL-SCNC: 4.3 MMOL/L (ref 3.4–5.3)
SODIUM SERPL-SCNC: 139 MMOL/L (ref 133–144)

## 2020-11-14 PROCEDURE — 250N000013 HC RX MED GY IP 250 OP 250 PS 637: Performed by: PSYCHIATRY & NEUROLOGY

## 2020-11-14 PROCEDURE — 250N000013 HC RX MED GY IP 250 OP 250 PS 637: Performed by: STUDENT IN AN ORGANIZED HEALTH CARE EDUCATION/TRAINING PROGRAM

## 2020-11-14 PROCEDURE — 80048 BASIC METABOLIC PNL TOTAL CA: CPT | Performed by: INTERNAL MEDICINE

## 2020-11-14 PROCEDURE — 99231 SBSQ HOSP IP/OBS SF/LOW 25: CPT | Performed by: INTERNAL MEDICINE

## 2020-11-14 PROCEDURE — 250N000013 HC RX MED GY IP 250 OP 250 PS 637: Performed by: INTERNAL MEDICINE

## 2020-11-14 PROCEDURE — 120N000001 HC R&B MED SURG/OB

## 2020-11-14 PROCEDURE — 36415 COLL VENOUS BLD VENIPUNCTURE: CPT | Performed by: INTERNAL MEDICINE

## 2020-11-14 RX ADMIN — FOLIC ACID 1 MG: 1 TABLET ORAL at 08:48

## 2020-11-14 RX ADMIN — CARBIDOPA AND LEVODOPA 7.5 MG: 50; 200 TABLET, EXTENDED RELEASE ORAL at 08:47

## 2020-11-14 RX ADMIN — QUETIAPINE FUMARATE 50 MG: 50 TABLET ORAL at 12:43

## 2020-11-14 RX ADMIN — PANTOPRAZOLE SODIUM 40 MG: 40 TABLET, DELAYED RELEASE ORAL at 21:56

## 2020-11-14 RX ADMIN — MAGNESIUM OXIDE 400 MG: 400 TABLET ORAL at 08:52

## 2020-11-14 RX ADMIN — FUROSEMIDE 10 MG: 20 TABLET ORAL at 08:48

## 2020-11-14 RX ADMIN — VORTIOXETINE 10 MG: 10 TABLET, FILM COATED ORAL at 08:48

## 2020-11-14 RX ADMIN — QUETIAPINE FUMARATE 100 MG: 100 TABLET ORAL at 08:48

## 2020-11-14 RX ADMIN — TAMSULOSIN HYDROCHLORIDE 0.4 MG: 0.4 CAPSULE ORAL at 08:48

## 2020-11-14 RX ADMIN — HYDROXYZINE HYDROCHLORIDE 50 MG: 25 TABLET, FILM COATED ORAL at 21:56

## 2020-11-14 RX ADMIN — MULTIPLE VITAMINS W/ MINERALS TAB 1 TABLET: TAB at 08:48

## 2020-11-14 RX ADMIN — QUETIAPINE FUMARATE 100 MG: 100 TABLET ORAL at 17:47

## 2020-11-14 RX ADMIN — MIRTAZAPINE 15 MG: 15 TABLET, FILM COATED ORAL at 21:56

## 2020-11-14 RX ADMIN — PANTOPRAZOLE SODIUM 40 MG: 40 TABLET, DELAYED RELEASE ORAL at 08:48

## 2020-11-14 RX ADMIN — CARBIDOPA AND LEVODOPA 7.5 MG: 50; 200 TABLET, EXTENDED RELEASE ORAL at 12:43

## 2020-11-14 RX ADMIN — QUETIAPINE FUMARATE 200 MG: 100 TABLET ORAL at 21:56

## 2020-11-14 RX ADMIN — CARBIDOPA AND LEVODOPA 7.5 MG: 50; 200 TABLET, EXTENDED RELEASE ORAL at 17:47

## 2020-11-14 RX ADMIN — NICOTINE 1 PATCH: 21 PATCH, EXTENDED RELEASE TRANSDERMAL at 08:55

## 2020-11-14 ASSESSMENT — ACTIVITIES OF DAILY LIVING (ADL)
ADLS_ACUITY_SCORE: 11

## 2020-11-14 NOTE — PROGRESS NOTES
[facility]    Medicine Progress Note - Hospitalist Service       Date of Admission:  10/14/2020  Assessment & Plan       Jim Richard is a 65 year old male was admitted on 10/14/2020 with PMH use disorder, cirrhosis, depression, recent hospitalization for intentional overdose, multiple hospitalization for intoxication who is being admitted again on 10/14/2020, due to continued drinking and suicidal ideation, stating that he would take all of his medications at once.  Recently hospitalized from 10/6 - 10/11 for alcohol intoxication with metabolic acidosis and C. difficile.  After discharge he states he stopped taking all of his medications and has been drinking because he does not care whether he lives or dies. Currently on a hold pending court hearing for commitment.      Depression, anxiety, suicidal ideation   Acute alcohol intoxication  Severe alcohol use disorder   Multiple recent admission for intoxication at risk of life-threateningillness/death  Patient has been through treatment multiple times in the past. He states he stating drinking again right after discharge. BAL 0.36 at admission.    - CIWA stopped  - Psychiatry was consulted-> has initiated a commitment process due to his recidivism, inability to maintain sobriety, multiple admissions secondary to alcohol-related issues and expressing suicidal ideation while intoxicated  - Added daily PRN Seroquel 50 mg   - Psych saw on 11/06 and adjusted seroquel, remeron  - Psych discontinued hydroxyzine but I still have it available as needed for sleep, follow up consult occurred 11/06, request follow up as needed   - Social work following for commitment process     Alcoholic cirrhosis with history of esophageal varices and ascites   History of GI bleed.  Grade 1 esophageal varices with portal gastropathy. He c/o diffuse abdominal discomfort and has obvious fluid wave on exam with diffuse mild tenderness on exam. Last paracentesis on  10/9 was negative for SBP and patient currently refusing paracentesis stating pain no different now than prior to that procedure. S/p paracentesis 10/19.   - Restarted back on lasix low dose 11/9  - Continue PPI   - PTA Atenolol stopped  - Nadolol started 10/29/2020.  Hold parameters for heart rate less than 55.  - Resume diuretics for now as LE edema worsening  - GI had been following and appreciate their recommendations.  Outpatient follow up [facility].  Can consider TIPS in the future      Bradycardia  Suspect due to beta blocker use   - Hold parameters for Nadolol in place for heart rate less than 55  - Atenolol has been discontinued   - Continue to monitor      Mild hyponatremia  Sodium stable in the low 130s -> 134  Resolved. Na wnl last 3 days  - Continue to monitor      Lactic acidosis secondary to above  Hemodynamically stable with low suspicion for infection / sepsis.  Likely related to liver disease      C. difficile colitis. Resolved   Diagnosed during his last admission CT A/P with circumferential wall thickening of rectum suggesting distal colitis/proctitis. procalcitonin low at 0.07. WBC 7.7. Afebrile.  Not taking vancomycin and currently having 2-3 loose stools a day on admission.  - Finished a course of oral vancomycin 125 mg every 6 hours     NGUYEN on CKD stage III. Resolved   Cr 1.63 on admission , Baseline creatinine 1.4-1.9.  Cr had bumped up to 2.1 over the past week, likely due to diuretics    Chronic anemia, nl MCV, stable  Hemoglobins initially in the 10s.  Have drifted down slightly to 8-9     BPH  - Continue flomax    JANIS  - Home CPAP ordered     Malnutrition  - Level of malnutrition: Non-Severe   - Based on: mild (or greater) subcutaneous fat loss, mild (or greater) muscle loss         Diet: Snacks/Supplements Adult: Other; Chocolate Shake + 2 pkts benepro w/ lunch and dinner (RD); With Meals  Regular Diet Adult    DVT Prophylaxis: Pneumatic Compression Devices  Leger Catheter: not  present  Code Status: Full Code           Disposition Plan   Expected discharge: TBD, disposition will be determined   Entered: Godfrey Vasques DO 11/14/2020, 11:13 AM       The patient's care was discussed with the Bedside Nurse, Care Coordinator/ and Patient.    Godfrey Vasques DO  Hospitalist Service  [facility]  Contact information available via [facility] Paging/Directory    ______________________________________________________________________    Interval History   Patient seen and examined.  No acute events over night.  No pain currently.  No fevers or chills.  Awaiting court decision    Data reviewed today: I reviewed all medications, new labs and imaging results over the last 24 hours. I personally reviewed no images or EKG's today.    Physical Exam   Vital Signs: Temp: 98.5  F (36.9  C) Temp src: Oral BP: 124/53 Pulse: 50   Resp: 16 SpO2: 98 % O2 Device: None (Room air)    Weight: 188 lbs 0 oz  General Appearance: Resting comfortably in chair.  NAD  Respiratory: Clear to auscultation.  No respiratory distress  Cardiovascular: RRR.  No obvious murmurs  GI: Bowel sounds present.  Non-tender  Skin: No rashes.  No cyanosis  Other: No edema.  No calf tenderness      Data   Recent Labs   Lab 11/13/20  1058 11/12/20  1048 11/11/20  0856    139 137   POTASSIUM 4.6 4.3 4.5   CHLORIDE 109 111* 109   CO2 22 20 22   BUN 48* 47* 45*   CR 2.08* 2.03* 2.04*   ANIONGAP 6 8 6   KEV 8.4* 8.4* 8.6   GLC 73 121* 144*     No results found for this or any previous visit (from the past 24 hour(s)).

## 2020-11-14 NOTE — PLAN OF CARE
DATE & TIME: 11/13/2020 Night shift  Cognitive Concerns/ Orientation : A&Ox4  BEHAVIOR & AGGRESSION TOOL COLOR: Green  ABNL VS/O2: VSS  MOBILITY: Independent  PAIN MANAGMENT: Left hip discomfort   DIET: Regular - appetite is good  BOWEL/BLADDER: Continent  ABNL LAB/BG: Cr 2.03  DRAIN/DEVICES: No IV access  SKIN: Bruised, scabs, bilateral lower extremity with 1+ edema - on lasix  D/C DAY/GOALS/PLACE: TBD, undergoing commitment process; [facility] is recommending [facility] following  OTHER IMPORTANT INFO:  NA    No changes overnight. VSS. Independent. PRN anxiety meds given at bedtime. Awaiting placement.

## 2020-11-14 NOTE — PLAN OF CARE
DATE & TIME: 11/14/2020 4469-0402  Cognitive Concerns/ Orientation : A&Ox4  BEHAVIOR & AGGRESSION TOOL COLOR: Green  ABNL VS/O2: VSS except Bradycardic with HR-40s.  MOBILITY: Independent  PAIN MANAGMENT: Scheduled lidocaine patch    DIET: Regular - appetite is good  BOWEL/BLADDER: Continent  ABNL LAB/BG: Cr 2.02, BUN-45  DRAIN/DEVICES: No IV access  SKIN: Bruised, scabs, bilateral lower extremity with 1+ edema-on lasix  D/C DAY/GOALS/PLACE: [facility], undergoing commitment process; [facility] is recommending [facility] following  OTHER IMPORTANT INFO: Received PO Seroquel x1 PRN anxiety.  Ambulated in hallway.  States he is tired of being here.

## 2020-11-15 LAB
ANION GAP SERPL CALCULATED.3IONS-SCNC: 7 MMOL/L (ref 3–14)
BUN SERPL-MCNC: 42 MG/DL (ref 7–30)
CALCIUM SERPL-MCNC: 8.6 MG/DL (ref 8.5–10.1)
CHLORIDE SERPL-SCNC: 111 MMOL/L (ref 94–109)
CO2 SERPL-SCNC: 21 MMOL/L (ref 20–32)
CREAT SERPL-MCNC: 2.09 MG/DL (ref 0.66–1.25)
GFR SERPL CREATININE-BSD FRML MDRD: 32 ML/MIN/{1.73_M2}
GLUCOSE SERPL-MCNC: 127 MG/DL (ref 70–99)
POTASSIUM SERPL-SCNC: 4 MMOL/L (ref 3.4–5.3)
SODIUM SERPL-SCNC: 139 MMOL/L (ref 133–144)

## 2020-11-15 PROCEDURE — 250N000013 HC RX MED GY IP 250 OP 250 PS 637: Performed by: PSYCHIATRY & NEUROLOGY

## 2020-11-15 PROCEDURE — 250N000013 HC RX MED GY IP 250 OP 250 PS 637: Performed by: INTERNAL MEDICINE

## 2020-11-15 PROCEDURE — 120N000001 HC R&B MED SURG/OB

## 2020-11-15 PROCEDURE — 99207 PR NO CHARGE LOS: CPT | Performed by: INTERNAL MEDICINE

## 2020-11-15 PROCEDURE — 250N000013 HC RX MED GY IP 250 OP 250 PS 637: Performed by: STUDENT IN AN ORGANIZED HEALTH CARE EDUCATION/TRAINING PROGRAM

## 2020-11-15 PROCEDURE — 80048 BASIC METABOLIC PNL TOTAL CA: CPT | Performed by: INTERNAL MEDICINE

## 2020-11-15 PROCEDURE — 36415 COLL VENOUS BLD VENIPUNCTURE: CPT | Performed by: INTERNAL MEDICINE

## 2020-11-15 RX ADMIN — QUETIAPINE FUMARATE 200 MG: 100 TABLET ORAL at 21:43

## 2020-11-15 RX ADMIN — PANTOPRAZOLE SODIUM 40 MG: 40 TABLET, DELAYED RELEASE ORAL at 08:02

## 2020-11-15 RX ADMIN — FOLIC ACID 1 MG: 1 TABLET ORAL at 08:01

## 2020-11-15 RX ADMIN — FUROSEMIDE 10 MG: 20 TABLET ORAL at 08:02

## 2020-11-15 RX ADMIN — NICOTINE 1 PATCH: 21 PATCH, EXTENDED RELEASE TRANSDERMAL at 08:04

## 2020-11-15 RX ADMIN — CARBIDOPA AND LEVODOPA 7.5 MG: 50; 200 TABLET, EXTENDED RELEASE ORAL at 08:01

## 2020-11-15 RX ADMIN — PANTOPRAZOLE SODIUM 40 MG: 40 TABLET, DELAYED RELEASE ORAL at 21:43

## 2020-11-15 RX ADMIN — QUETIAPINE FUMARATE 50 MG: 50 TABLET ORAL at 11:52

## 2020-11-15 RX ADMIN — QUETIAPINE FUMARATE 100 MG: 100 TABLET ORAL at 08:02

## 2020-11-15 RX ADMIN — VORTIOXETINE 10 MG: 10 TABLET, FILM COATED ORAL at 08:01

## 2020-11-15 RX ADMIN — TAMSULOSIN HYDROCHLORIDE 0.4 MG: 0.4 CAPSULE ORAL at 08:01

## 2020-11-15 RX ADMIN — MULTIPLE VITAMINS W/ MINERALS TAB 1 TABLET: TAB at 08:02

## 2020-11-15 RX ADMIN — MAGNESIUM OXIDE 400 MG: 400 TABLET ORAL at 08:02

## 2020-11-15 RX ADMIN — HYDROXYZINE HYDROCHLORIDE 50 MG: 25 TABLET, FILM COATED ORAL at 21:50

## 2020-11-15 RX ADMIN — CARBIDOPA AND LEVODOPA 7.5 MG: 50; 200 TABLET, EXTENDED RELEASE ORAL at 11:49

## 2020-11-15 RX ADMIN — CARBIDOPA AND LEVODOPA 7.5 MG: 50; 200 TABLET, EXTENDED RELEASE ORAL at 17:27

## 2020-11-15 RX ADMIN — QUETIAPINE FUMARATE 100 MG: 100 TABLET ORAL at 17:27

## 2020-11-15 RX ADMIN — MIRTAZAPINE 15 MG: 15 TABLET, FILM COATED ORAL at 21:43

## 2020-11-15 ASSESSMENT — ACTIVITIES OF DAILY LIVING (ADL)
ADLS_ACUITY_SCORE: 12
ADLS_ACUITY_SCORE: 11
ADLS_ACUITY_SCORE: 12
ADLS_ACUITY_SCORE: 12

## 2020-11-15 NOTE — PLAN OF CARE
DATE & TIME: 11/15/2020 3681-5915  Cognitive Concerns/ Orientation : A&Ox4  BEHAVIOR & AGGRESSION TOOL COLOR: Green  ABNL VS/O2: VSS on RA  MOBILITY: Independent  PAIN MANAGMENT: denied.  DIET: Regular - appetite is good  BOWEL/BLADDER: Continent  ABNL LAB/BG: Cr 2.09, Bun 40  DRAIN/DEVICES: No IV access  SKIN: Bruised, scabs, bilateral lower extremity with 1+ edema - on lasix  D/C DAY/GOALS/PLACE: TBD, undergoing commitment process;  - SW following  OTHER IMPORTANT INFO: ambulated in the [address] way few times, had shower,given PRN Seroquel x 1 for anxiety in addition to scheduled Seroquel.

## 2020-11-15 NOTE — PLAN OF CARE
DATE & TIME: 11/14/2020 1900-0730  Cognitive Concerns/ Orientation : A&Ox4  BEHAVIOR & AGGRESSION TOOL COLOR: Green  ABNL VS/O2: VSS on RA  MOBILITY: Independent  PAIN MANAGMENT: Left hip discomfort. States that it comes and goes. Refused lidocaine patches.  DIET: Regular - appetite is good  BOWEL/BLADDER: Continent  ABNL LAB/BG: Cr 2.03  DRAIN/DEVICES: No IV access  SKIN: Bruised, scabs, bilateral lower extremity with 1+ edema - on lasix  D/C DAY/GOALS/PLACE: TBD, undergoing commitment process; [facility] is recommending [facility] following  OTHER IMPORTANT INFO: NA

## 2020-11-15 NOTE — PROGRESS NOTES
[facility]    Medicine Progress Note - Hospitalist Service       Date of Admission:  10/14/2020  Assessment & Plan             Jim Richard is a 65 year old male was admitted on 10/14/2020 with PMH use disorder, cirrhosis, depression, recent hospitalization for intentional overdose, multiple hospitalization for intoxication who is being admitted again on 10/14/2020, due to continued drinking and suicidal ideation, stating that he would take all of his medications at once.  Recently hospitalized from 10/6 - 10/11 for alcohol intoxication with metabolic acidosis and C. difficile.  After discharge he states he stopped taking all of his medications and has been drinking because he does not care whether he lives or dies. Currently on a hold pending court hearing for commitment.      Depression, anxiety, suicidal ideation   Acute alcohol intoxication  Severe alcohol use disorder   Multiple recent admission for intoxication at risk of life-threateningillness/death  Patient has been through treatment multiple times in the past. He states he stating drinking again right after discharge. BAL 0.36 at admission.    - CIWA stopped  - Psychiatry was consulted-> has initiated a commitment process due to his recidivism, inability to maintain sobriety, multiple admissions secondary to alcohol-related issues and expressing suicidal ideation while intoxicated  - Added daily PRN Seroquel 50 mg   - Psych saw on 11/06 and adjusted seroquel, remeron  - Psych discontinued hydroxyzine but I still have it available as needed for sleep, follow up consult occurred 11/06, request follow up as needed   - Social work following for commitment process     Alcoholic cirrhosis with history of esophageal varices and ascites   History of GI bleed.  Grade 1 esophageal varices with portal gastropathy. He c/o diffuse abdominal discomfort and has obvious fluid wave on exam with diffuse mild tenderness on exam. Last  paracentesis on 10/9 was negative for SBP and patient currently refusing paracentesis stating pain no different now than prior to that procedure. S/p paracentesis 10/19.   - Restarted back on lasix low dose 11/9  - Continue PPI   - PTA Atenolol stopped  - Nadolol started 10/29/2020.  Hold parameters for heart rate less than 55.  - Resume diuretics for now as LE edema worsening  - GI had been following and appreciate their recommendations.  Outpatient follow up [facility].  Can consider TIPS in the future      Bradycardia  Suspect due to beta blocker use   - Hold parameters for Nadolol in place for heart rate less than 55  - Atenolol has been discontinued   - Continue to monitor      Mild hyponatremia  Sodium stable in the low 130s -> 134  Resolved. Na wnl last 3 days  - Continue to monitor      Lactic acidosis secondary to above  Hemodynamically stable with low suspicion for infection / sepsis.  Likely related to liver disease      C. difficile colitis. Resolved   Diagnosed during his last admission CT A/P with circumferential wall thickening of rectum suggesting distal colitis/proctitis. procalcitonin low at 0.07. WBC 7.7. Afebrile.  Not taking vancomycin and currently having 2-3 loose stools a day on admission.  - Finished a course of oral vancomycin 125 mg every 6 hours     NGUYEN on CKD stage III. Resolved   Cr 1.63 on admission , Baseline creatinine 1.4-1.9.  Cr had bumped up to 2.1 over the past week, likely due to diuretics    Chronic anemia, nl MCV, stable  Hemoglobins initially in the 10s.  Have drifted down slightly to 8-9     BPH  - Continue flomax    JANIS  - Home CPAP ordered     Malnutrition  - Level of malnutrition: Non-Severe   - Based on: mild (or greater) subcutaneous fat loss, mild (or greater) muscle loss         Diet: Snacks/Supplements Adult: Other; Chocolate Shake + 2 pkts benepro w/ lunch and dinner (RD); With Meals  Regular Diet Adult    DVT Prophylaxis: Pneumatic Compression Devices  Leger  Catheter: not present  Code Status: Full Code           Disposition Plan   Expected discharge: TBD, once commitment process complete  Entered: Godfrey Vasques DO 11/15/2020, 1:54 PM       The patient's care was discussed with the Bedside Nurse and Care Coordinator/.    Godfrey Vasques DO  Hospitalist Service  [facility]  Contact information available via [facility] Paging/Directory    ______________________________________________________________________    Interval History   Patient was not evaluated as not in room x2 today.  Will recheck tomorrow.   No concerns per nursing    Data reviewed today: I reviewed all medications, new labs and imaging results over the last 24 hours. I personally reviewed no images or EKG's today.    Physical Exam   Vital Signs: Temp: 98.1  F (36.7  C) Temp src: Oral BP: (!) 146/67 Pulse: 55   Resp: 18 SpO2: 99 % O2 Device: None (Room air)    Weight: 188 lbs 0 oz     Did not examine non-billing note    Data   Recent Labs   Lab 11/15/20  1149 11/14/20  1217 11/13/20  1058    139 137   POTASSIUM 4.0 4.3 4.6   CHLORIDE 111* 111* 109   CO2 21 20 22   BUN 42* 45* 48*   CR 2.09* 2.02* 2.08*   ANIONGAP 7 8 6   KEV 8.6 8.6 8.4*   * 111* 73     Medications       folic acid  1 mg Oral Daily     furosemide  10 mg Oral Daily     lidocaine  1 patch Transdermal Q24h    And     lidocaine   Transdermal Q8H     magnesium oxide  400 mg Oral Daily     midodrine  7.5 mg Oral TID w/meals     mirtazapine  15 mg Oral At Bedtime     multivitamin w/minerals  1 tablet Oral Daily     nadolol  40 mg Oral Daily     nicotine  1 patch Transdermal Daily     nicotine   Transdermal Q8H     pantoprazole  40 mg Oral BID     QUEtiapine  100 mg Oral BID w/meals     QUEtiapine  200 mg Oral At Bedtime     tamsulosin  0.4 mg Oral Daily     vortioxetine  10 mg Oral Daily

## 2020-11-16 LAB
ANION GAP SERPL CALCULATED.3IONS-SCNC: 7 MMOL/L (ref 3–14)
BUN SERPL-MCNC: 40 MG/DL (ref 7–30)
CALCIUM SERPL-MCNC: 8.6 MG/DL (ref 8.5–10.1)
CHLORIDE SERPL-SCNC: 109 MMOL/L (ref 94–109)
CO2 SERPL-SCNC: 23 MMOL/L (ref 20–32)
CREAT SERPL-MCNC: 1.99 MG/DL (ref 0.66–1.25)
GFR SERPL CREATININE-BSD FRML MDRD: 34 ML/MIN/{1.73_M2}
GLUCOSE SERPL-MCNC: 90 MG/DL (ref 70–99)
POTASSIUM SERPL-SCNC: 4.4 MMOL/L (ref 3.4–5.3)
SODIUM SERPL-SCNC: 139 MMOL/L (ref 133–144)

## 2020-11-16 PROCEDURE — 250N000013 HC RX MED GY IP 250 OP 250 PS 637: Performed by: INTERNAL MEDICINE

## 2020-11-16 PROCEDURE — 250N000013 HC RX MED GY IP 250 OP 250 PS 637: Performed by: STUDENT IN AN ORGANIZED HEALTH CARE EDUCATION/TRAINING PROGRAM

## 2020-11-16 PROCEDURE — 250N000013 HC RX MED GY IP 250 OP 250 PS 637: Performed by: PSYCHIATRY & NEUROLOGY

## 2020-11-16 PROCEDURE — 80048 BASIC METABOLIC PNL TOTAL CA: CPT | Performed by: INTERNAL MEDICINE

## 2020-11-16 PROCEDURE — 36415 COLL VENOUS BLD VENIPUNCTURE: CPT | Performed by: INTERNAL MEDICINE

## 2020-11-16 PROCEDURE — 99231 SBSQ HOSP IP/OBS SF/LOW 25: CPT | Performed by: INTERNAL MEDICINE

## 2020-11-16 PROCEDURE — 120N000001 HC R&B MED SURG/OB

## 2020-11-16 RX ADMIN — CARBIDOPA AND LEVODOPA 7.5 MG: 50; 200 TABLET, EXTENDED RELEASE ORAL at 17:14

## 2020-11-16 RX ADMIN — HYDROXYZINE HYDROCHLORIDE 50 MG: 25 TABLET, FILM COATED ORAL at 22:01

## 2020-11-16 RX ADMIN — QUETIAPINE FUMARATE 50 MG: 50 TABLET ORAL at 12:45

## 2020-11-16 RX ADMIN — QUETIAPINE FUMARATE 100 MG: 100 TABLET ORAL at 17:14

## 2020-11-16 RX ADMIN — VORTIOXETINE 10 MG: 10 TABLET, FILM COATED ORAL at 08:43

## 2020-11-16 RX ADMIN — PANTOPRAZOLE SODIUM 40 MG: 40 TABLET, DELAYED RELEASE ORAL at 22:01

## 2020-11-16 RX ADMIN — PANTOPRAZOLE SODIUM 40 MG: 40 TABLET, DELAYED RELEASE ORAL at 08:42

## 2020-11-16 RX ADMIN — MAGNESIUM OXIDE 400 MG: 400 TABLET ORAL at 08:43

## 2020-11-16 RX ADMIN — CARBIDOPA AND LEVODOPA 7.5 MG: 50; 200 TABLET, EXTENDED RELEASE ORAL at 08:42

## 2020-11-16 RX ADMIN — NICOTINE 1 PATCH: 21 PATCH, EXTENDED RELEASE TRANSDERMAL at 08:47

## 2020-11-16 RX ADMIN — MIRTAZAPINE 15 MG: 15 TABLET, FILM COATED ORAL at 22:01

## 2020-11-16 RX ADMIN — QUETIAPINE FUMARATE 200 MG: 100 TABLET ORAL at 22:01

## 2020-11-16 RX ADMIN — CARBIDOPA AND LEVODOPA 7.5 MG: 50; 200 TABLET, EXTENDED RELEASE ORAL at 12:45

## 2020-11-16 RX ADMIN — FUROSEMIDE 10 MG: 20 TABLET ORAL at 08:43

## 2020-11-16 RX ADMIN — FOLIC ACID 1 MG: 1 TABLET ORAL at 08:43

## 2020-11-16 RX ADMIN — QUETIAPINE FUMARATE 100 MG: 100 TABLET ORAL at 08:43

## 2020-11-16 RX ADMIN — LIDOCAINE 1 PATCH: 560 PATCH PERCUTANEOUS; TOPICAL; TRANSDERMAL at 22:00

## 2020-11-16 RX ADMIN — MULTIPLE VITAMINS W/ MINERALS TAB 1 TABLET: TAB at 08:42

## 2020-11-16 RX ADMIN — TAMSULOSIN HYDROCHLORIDE 0.4 MG: 0.4 CAPSULE ORAL at 08:42

## 2020-11-16 ASSESSMENT — ACTIVITIES OF DAILY LIVING (ADL)
ADLS_ACUITY_SCORE: 11

## 2020-11-16 NOTE — PROGRESS NOTES
Care Management Follow Up    Length of Stay (days): 33    Expected Discharge Date: (Late November when [facility] has a vacancy.)     Concerns to be Addressed: (patient would like to move into an [facility] in [place])  (The [facility] is recommending a commitment to [facility] )  Patient plan of care discussed at interdisciplinary rounds: Yes    Anticipated Discharge Disposition: ([facility] will provide)     Anticipated Discharge Services:    Anticipated Discharge DME:      Patient/family educated on Medicare website which has current facility and service quality ratings: (n/a)  Education Provided on the Discharge Plan:    Patient/Family in Agreement with the Plan: (pt. not in agreement with Court Order)    Referrals Placed by CM/SW:    Private pay costs discussed: Any costs not billable to insurance  are covered by State funding due to patient being under a commitment.    Additional Information:  Darius spoke with Omayra Angela, the [facility]  at [phone].  She faxed a copy of the Civil Commitment Court Order. The Court Order is dated 11/10, 2020 and will  on May 10, 2021. Patient is committed  as chemically dependent to the Commissioner of Human Services.    Ms Angela has recommended patient be admitted to [facility] long term residential CD program located in [place].  She has spoken with [facility] and they currently have a two wait list.  Writer will contact the two detox units in [facility] to ask if either unit can accept patient and hold him there till [facility] can admit patient.  If detox units decline patient, then he will stay here till [facility] can admit.   Thus patient will stay here until [facility] has a vacancy    1412 Update:  Referrals made to Management Withdrawal at [address] [place] [address] and Withdrawal Management at [facility].  Clinical information faxed to both sites for their review.  Will likely hear back from the facilities tomorrow.  Patient's Cdiff history may be a barrier.    Today  writer has  spoken with patient twice at his request regarding the transfer to [facility].  He is strongly opposed as he doesn't feel the treatment is needed.  He strongly prefers to go home and work on selling his house so he can move into an jail in [place], [place].  Writer explained his CM, Omayra Coreen and writer do not  support his request due to his history of non abstaining from alcohol when on his own. Ms Angela and that [facility] will be a sober environment until he can make arrangements to sell his house and move to an [facility].  He sites the difficulty in trying to sell his house long distances and doesn't want to consider asking his family to assist with selling his house.   Ms Angela reviewed patient's preference  with Shu at the [facility] admissions office.  Shu completes patient's provisional discharge from the hospital.  Shu strongly recommends the plan be maintained to admit patient directly to [facility].   Writer encouraged patient to contact Ms Nelson to discuss.     Earline Mauricio, [facility] Hospital[facility]

## 2020-11-16 NOTE — PROGRESS NOTES
"SPIRITUAL HEALTH SERVICES  SPIRITUAL ASSESSMENT Progress Note  [facility] 65    PRIMARY FOCUS:     Emotional/spiritual/Congregation distress    Support for coping    ILLNESS CIRCUMSTANCES:   Reviewed documentation. Reflective conversation shared with Ptnt which integrated elements of illness and family narratives.     Context of Serious Illness/Symptom(s) - Alcohol dependence, depression, coping    Resources for Support - AA; some already-learned skills, such as Four-Square Breathing, prayer.    DISTRESS:     Emotional/Existential/Relational Distress - guilt and shame for past behaviours, resorting to drinking instead of reaching out for help. Lack of control in life.    Spiritual/Rastafarian Distress - Believes in God but doesn't have a particular image of God, but also mentioning experiencing the Creator in elements of Nature (\"a tree\" is one).    Social/Cultural/Economic Distress - Ptnt wants to go home but in a commitment process. He feels there's a \"lack of communication\" around his needs and desires. He feels frustrated at the lack of control in his situation. We talked about the possibility of my speaking with Social Work about this.    SPIRITUAL/Muslim COPING:     Buddhist/Yolanda - Nondenominational and Nondenominational Orthodoxy attendance in past but none now.    Spiritual Practice(s) - \"short prayers\" of varying words, improvised on his own.    Emotional/Existential/Relational Connections - Ptnt referenced two AA sponsors \"that were good\" but they are not part of his life now. He expressed shame and regret about divorce 12 years ago and children not being in his life.    GOALS OF CARE:    Goals of Care - \"Work with his mind\", be able to \"ask for help\", go home again but not drink    Meaning/Sense-Making - I shared reflective conversation and emotional support for the challenges of the disease of alcoholism, self-forgiveness, openness to get help, the lack of control inherent to being alive. Ptnt referenced his \"stubborn Azeri\" side " as part of why he doesn't reach out, which I encouraged to flip and use as an aid in working with his life.     PLAN: I offered review of and support for Four-Square Breathing; I invited the practice of writing down 3 appreciations every day, including something about himself; I encouraged considering a rehab/residential situation as a bridge to healing.     I said a prayer and sang a blessing; Ptnt was tearful at the end. I invited Ptnt to reach out to  whenever he felt the desire.    I spoke briefly with the unit , sharing Ptnt's communication concerns and desire to go home instead of being committed. SW was already aware.     continues to be available.    Andreina Cortez  Chaplain Resident

## 2020-11-16 NOTE — PLAN OF CARE
DATE & TIME: 11/15/2020 4168-6081    Cognitive Concerns/ Orientation : Pt A/Ox4   BEHAVIOR & AGGRESSION TOOL COLOR: Green   ABNL VS/O2: VSS on RA  MOBILITY: Independent  PAIN MANAGMENT: Denies  DIET: Regular  BOWEL/BLADDER: Continent  ABNL LAB/BG: Cr 2.09  DRAIN/DEVICES: No IV access  SKIN: Bruised, scabs. +1 edema bilateral lower extremities  D/C DAY/GOALS/PLACE: Discharge pending progress, commitment process

## 2020-11-16 NOTE — PROGRESS NOTES
[facility]    Medicine Progress Note - Hospitalist Service       Date of Admission:  10/14/2020  Assessment & Plan       Jim Richard is a 65 year old male was admitted on 10/14/2020 with PMH use disorder, cirrhosis, depression, recent hospitalization for intentional overdose, multiple hospitalization for intoxication who is being admitted again on 10/14/2020, due to continued drinking and suicidal ideation, stating that he would take all of his medications at once.  Recently hospitalized from 10/6 - 10/11 for alcohol intoxication with metabolic acidosis and C. difficile.  After discharge he states he stopped taking all of his medications and has been drinking because he does not care whether he lives or dies. Currently on a hold pending court hearing for commitment.      Depression, anxiety, suicidal ideation   Acute alcohol intoxication  Severe alcohol use disorder   Multiple recent admission for intoxication at risk of life-threateningillness/death  Patient has been through treatment multiple times in the past. He states he stating drinking again right after discharge. BAL 0.36 at admission.    - CIWA stopped  - Psychiatry was consulted-> has initiated a commitment process due to his recidivism, inability to maintain sobriety, multiple admissions secondary to alcohol-related issues and expressing suicidal ideation while intoxicated  - Added daily PRN Seroquel 50 mg   - Psych saw on 11/06 and adjusted seroquel, remeron  - Psych discontinued hydroxyzine but I still have it available as needed for sleep, follow up consult occurred 11/06, request follow up as needed   - Social work following for commitment process.  [facility] has agreed with the commitment process and the recommendation is inpatient CD.  Looking to see if detox units can take patient until bed opens at [facility]      Alcoholic cirrhosis with history of esophageal varices and ascites   History of GI bleed.  Grade 1  esophageal varices with portal gastropathy. He c/o diffuse abdominal discomfort and has obvious fluid wave on exam with diffuse mild tenderness on exam. Last paracentesis on 10/9 was negative for SBP and patient currently refusing paracentesis stating pain no different now than prior to that procedure. S/p paracentesis 10/19.   - Restarted back on lasix low dose 11/9  - Continue PPI   - PTA Atenolol stopped  - Nadolol started 10/29/2020.  Hold parameters for heart rate less than 55.  - Resume diuretics for now as LE edema worsening  - GI had been following and appreciate their recommendations.  Outpatient follow up [facility].  Can consider TIPS in the future      Bradycardia  Suspect due to beta blocker use   - Hold parameters for Nadolol in place for heart rate less than 55  - Atenolol has been discontinued   - Continue to monitor      Mild hyponatremia  Sodium stable in the low 130s -> 134  Resolved. Na wnl last 3 days  - Continue to monitor      Lactic acidosis secondary to above  Hemodynamically stable with low suspicion for infection / sepsis.  Likely related to liver disease      C. difficile colitis. Resolved   Diagnosed during his last admission CT A/P with circumferential wall thickening of rectum suggesting distal colitis/proctitis. procalcitonin low at 0.07. WBC 7.7. Afebrile.  Not taking vancomycin and currently having 2-3 loose stools a day on admission.  - Finished a course of oral vancomycin 125 mg every 6 hours     NGUYEN on CKD stage III. Resolved   Cr 1.63 on admission , Baseline creatinine 1.4-1.9.  Cr had bumped up to 2.1 over the past week, likely due to diuretics    Chronic anemia, nl MCV, stable  Hemoglobins initially in the 10s.  Have drifted down slightly to 8-9     BPH  - Continue flomax    JANIS  - Home CPAP ordered     Malnutrition  - Level of malnutrition: Non-Severe   - Based on: mild (or greater) subcutaneous fat loss, mild (or greater) muscle loss         Diet: Snacks/Supplements Adult:  Other; Chocolate Shake + 2 pkts benepro w/ lunch and dinner (RD); With Meals  Regular Diet Adult    DVT Prophylaxis: Pneumatic Compression Devices  Leger Catheter: not present  Code Status: Full Code           Disposition Plan   Expected discharge: TBD.  The [facility] has agreed with commitment and want patient to go to inpatient CD at [facility].  They do not have a bed right now so social work is checking to see if patient can go to detox in the meantime.  If not, will need to stay here until [facility] bed is available.  Entered: Godfrey Vasques DO 11/16/2020, 12:12 PM       The patient's care was discussed with the Bedside Nurse, Care Coordinator/ and Patient.    Godfrey Vasques DO  Hospitalist Service  [facility]  Contact information available via [facility] Paging/Directory    ______________________________________________________________________    Interval History   Patient seen and examined.  No acute events over night.  No fevers or chills noted.  No pain at this time.  No difficulty breathing.     Data reviewed today: I reviewed all medications, new labs and imaging results over the last 24 hours. I personally reviewed no images or EKG's today.    Physical Exam   Vital Signs: Temp: 98.4  F (36.9  C) Temp src: Oral BP: (!) 157/82 Pulse: 57   Resp: 16 SpO2: 99 % O2 Device: None (Room air)    Weight: 188 lbs 12.8 oz  General Appearance: Resting comfortably in bed.  NAD   Respiratory: Clear to auscultation.  No respiratory distress  Cardiovascular: RRR.  No obvious murmurs  GI: Bowel sounds present.  Mild to moderate distension but non-tender  Skin: No obvious rashes or cyanosis  Other: No edema.  No calf tenderness      Data   Recent Labs   Lab 11/15/20  1149 11/14/20  1217 11/13/20  1058    139 137   POTASSIUM 4.0 4.3 4.6   CHLORIDE 111* 111* 109   CO2 21 20 22   BUN 42* 45* 48*   CR 2.09* 2.02* 2.08*   ANIONGAP 7 8 6   KEV 8.6 8.6 8.4*   * 111* 73     No  results found for this or any previous visit (from the past 24 hour(s)).

## 2020-11-17 LAB
ANION GAP SERPL CALCULATED.3IONS-SCNC: 7 MMOL/L (ref 3–14)
BUN SERPL-MCNC: 41 MG/DL (ref 7–30)
CALCIUM SERPL-MCNC: 8.6 MG/DL (ref 8.5–10.1)
CHLORIDE SERPL-SCNC: 109 MMOL/L (ref 94–109)
CO2 SERPL-SCNC: 22 MMOL/L (ref 20–32)
CREAT SERPL-MCNC: 1.99 MG/DL (ref 0.66–1.25)
GFR SERPL CREATININE-BSD FRML MDRD: 34 ML/MIN/{1.73_M2}
GLUCOSE SERPL-MCNC: 146 MG/DL (ref 70–99)
POTASSIUM SERPL-SCNC: 4.2 MMOL/L (ref 3.4–5.3)
SODIUM SERPL-SCNC: 138 MMOL/L (ref 133–144)

## 2020-11-17 PROCEDURE — 99231 SBSQ HOSP IP/OBS SF/LOW 25: CPT | Performed by: INTERNAL MEDICINE

## 2020-11-17 PROCEDURE — 250N000013 HC RX MED GY IP 250 OP 250 PS 637: Performed by: STUDENT IN AN ORGANIZED HEALTH CARE EDUCATION/TRAINING PROGRAM

## 2020-11-17 PROCEDURE — 250N000013 HC RX MED GY IP 250 OP 250 PS 637: Performed by: INTERNAL MEDICINE

## 2020-11-17 PROCEDURE — 80048 BASIC METABOLIC PNL TOTAL CA: CPT | Performed by: INTERNAL MEDICINE

## 2020-11-17 PROCEDURE — 36415 COLL VENOUS BLD VENIPUNCTURE: CPT | Performed by: INTERNAL MEDICINE

## 2020-11-17 PROCEDURE — 120N000001 HC R&B MED SURG/OB

## 2020-11-17 PROCEDURE — 250N000013 HC RX MED GY IP 250 OP 250 PS 637: Performed by: PSYCHIATRY & NEUROLOGY

## 2020-11-17 RX ADMIN — QUETIAPINE FUMARATE 200 MG: 100 TABLET ORAL at 21:44

## 2020-11-17 RX ADMIN — NICOTINE 1 PATCH: 21 PATCH, EXTENDED RELEASE TRANSDERMAL at 08:09

## 2020-11-17 RX ADMIN — CARBIDOPA AND LEVODOPA 7.5 MG: 50; 200 TABLET, EXTENDED RELEASE ORAL at 08:06

## 2020-11-17 RX ADMIN — VORTIOXETINE 10 MG: 10 TABLET, FILM COATED ORAL at 08:07

## 2020-11-17 RX ADMIN — FLUTICASONE FUROATE AND VILANTEROL TRIFENATATE 1 PUFF: 200; 25 POWDER RESPIRATORY (INHALATION) at 08:07

## 2020-11-17 RX ADMIN — PANTOPRAZOLE SODIUM 40 MG: 40 TABLET, DELAYED RELEASE ORAL at 21:44

## 2020-11-17 RX ADMIN — FOLIC ACID 1 MG: 1 TABLET ORAL at 08:07

## 2020-11-17 RX ADMIN — MAGNESIUM OXIDE 400 MG: 400 TABLET ORAL at 08:07

## 2020-11-17 RX ADMIN — QUETIAPINE FUMARATE 100 MG: 100 TABLET ORAL at 08:06

## 2020-11-17 RX ADMIN — MIRTAZAPINE 15 MG: 15 TABLET, FILM COATED ORAL at 21:44

## 2020-11-17 RX ADMIN — LIDOCAINE 1 PATCH: 560 PATCH PERCUTANEOUS; TOPICAL; TRANSDERMAL at 21:43

## 2020-11-17 RX ADMIN — CARBIDOPA AND LEVODOPA 7.5 MG: 50; 200 TABLET, EXTENDED RELEASE ORAL at 17:05

## 2020-11-17 RX ADMIN — QUETIAPINE FUMARATE 100 MG: 100 TABLET ORAL at 17:06

## 2020-11-17 RX ADMIN — HYDROXYZINE HYDROCHLORIDE 50 MG: 25 TABLET, FILM COATED ORAL at 21:44

## 2020-11-17 RX ADMIN — TAMSULOSIN HYDROCHLORIDE 0.4 MG: 0.4 CAPSULE ORAL at 08:06

## 2020-11-17 RX ADMIN — PANTOPRAZOLE SODIUM 40 MG: 40 TABLET, DELAYED RELEASE ORAL at 08:06

## 2020-11-17 RX ADMIN — QUETIAPINE FUMARATE 50 MG: 50 TABLET ORAL at 11:40

## 2020-11-17 RX ADMIN — MULTIPLE VITAMINS W/ MINERALS TAB 1 TABLET: TAB at 08:06

## 2020-11-17 RX ADMIN — CARBIDOPA AND LEVODOPA 7.5 MG: 50; 200 TABLET, EXTENDED RELEASE ORAL at 11:37

## 2020-11-17 RX ADMIN — FUROSEMIDE 10 MG: 20 TABLET ORAL at 08:07

## 2020-11-17 ASSESSMENT — ACTIVITIES OF DAILY LIVING (ADL)
ADLS_ACUITY_SCORE: 11

## 2020-11-17 NOTE — PROGRESS NOTES
[facility]    Medicine Progress Note - Hospitalist Service       Date of Admission:  10/14/2020  Assessment & Plan       Jim Richard is a 65 year old male was admitted on 10/14/2020 with PMH use disorder, cirrhosis, depression, recent hospitalization for intentional overdose, multiple hospitalization for intoxication who is being admitted again on 10/14/2020, due to continued drinking and suicidal ideation, stating that he would take all of his medications at once.  Recently hospitalized from 10/6 - 10/11 for alcohol intoxication with metabolic acidosis and C. difficile.  After discharge he states he stopped taking all of his medications and has been drinking because he does not care whether he lives or dies. Currently on a hold pending court hearing for commitment.      Depression, anxiety, suicidal ideation   Acute alcohol intoxication  Severe alcohol use disorder   Multiple recent admission for intoxication at risk of life-threateningillness/death  Patient has been through treatment multiple times in the past. He states he stating drinking again right after discharge. BAL 0.36 at admission.    - CIWA stopped  - Psychiatry was consulted-> has initiated a commitment process due to his recidivism, inability to maintain sobriety, multiple admissions secondary to alcohol-related issues and expressing suicidal ideation while intoxicated  - Added daily PRN Seroquel 50 mg   - Psych saw on 11/06 and adjusted seroquel, remeron  - Psych discontinued hydroxyzine but I still have it available as needed for sleep, follow up consult occurred 11/06, request follow up as needed   - Social work following for commitment process.  [facility] has agreed with the commitment process and the recommendation is inpatient CD.  Looking to see if detox units can take patient until bed opens at [facility]      Alcoholic cirrhosis with history of esophageal varices and ascites   History of GI bleed.  Grade 1  esophageal varices with portal gastropathy. He c/o diffuse abdominal discomfort and has obvious fluid wave on exam with diffuse mild tenderness on exam. Last paracentesis on 10/9 was negative for SBP and patient currently refusing paracentesis stating pain no different now than prior to that procedure. S/p paracentesis 10/19.   - Restarted back on lasix low dose 11/9  - Continue PPI   - PTA Atenolol stopped  - Nadolol started 10/29/2020.  Hold parameters for heart rate less than 55.  - Resume diuretics for now as LE edema worsening  - GI had been following and appreciate their recommendations.  Outpatient follow up [facility].  Can consider TIPS in the future      Bradycardia  Suspect due to beta blocker use   - Hold parameters for Nadolol in place for heart rate less than 55  - Atenolol has been discontinued   - Continue to monitor      Mild hyponatremia  Sodium stable in the low 130s -> 134  Resolved. Na wnl last 3 days  - Continue to monitor      Lactic acidosis secondary to above  Hemodynamically stable with low suspicion for infection / sepsis.  Likely related to liver disease      C. difficile colitis. Resolved   Diagnosed during his last admission CT A/P with circumferential wall thickening of rectum suggesting distal colitis/proctitis. procalcitonin low at 0.07. WBC 7.7. Afebrile.  Not taking vancomycin and currently having 2-3 loose stools a day on admission.  - Finished a course of oral vancomycin 125 mg every 6 hours     NGUYEN on CKD stage III. Resolved   Cr 1.63 on admission , Baseline creatinine 1.4-1.9.  Cr had bumped up to 2.1 over the past week, likely due to diuretics    Chronic anemia, nl MCV, stable  Hemoglobins initially in the 10s.  Have drifted down slightly to 8-9     BPH  - Continue flomax    JANIS  - Home CPAP ordered     Malnutrition  - Level of malnutrition: Non-Severe   - Based on: mild (or greater) subcutaneous fat loss, mild (or greater) muscle loss         Diet: Snacks/Supplements Adult:  Other; Chocolate Shake + 2 pkts benepro w/ lunch and dinner (RD); With Meals  Regular Diet Adult    DVT Prophylaxis: Pneumatic Compression Devices  Leger Catheter: not present  Code Status: Full Code           Disposition Plan   Expected discharge: TBD.  The [facility] has agreed with commitment and want patient to go to inpatient CD at [facility].  They do not have a bed right now so social work is checking to see if patient can go to detox in the meantime.  If not, will need to stay here until [facility] bed is available.  Entered: Godfrey Vasques DO 11/17/2020, 11:13 AM       The patient's care was discussed with the Bedside Nurse, Care Coordinator/ and Patient.    Godfrey Vasques DO  Hospitalist Service  [facility]  Contact information available via [facility] Paging/Directory    ______________________________________________________________________    Interval History   Patient seen and examined.  No acute events over night.  Frustrated with court/ decision regarding inpatient CD treatment.  No pain or trouble breathing.  Tolerating diet.     Data reviewed today: I reviewed all medications, new labs and imaging results over the last 24 hours. I personally reviewed no images or EKG's today.    Physical Exam   Vital Signs: Temp: 98  F (36.7  C) Temp src: Oral BP: (!) 148/67 Pulse: 56   Resp: 18 SpO2: 97 % O2 Device: None (Room air)    Weight: 189 lbs 6.4 oz  General Appearance: Resting comfortably in chair.  NAD   Respiratory: Clear to auscultation.  No respiratory distress  Cardiovascular: RRR.   No murmurs   GI: Bowel sounds present.  Non-tender.  Minimally distended  Skin: No obvious rashes.  No cyanosis  Other: No edema.  No calf tenderness      Data   Recent Labs   Lab 11/17/20  0857 11/16/20  1150 11/15/20  1149    139 139   POTASSIUM 4.2 4.4 4.0   CHLORIDE 109 109 111*   CO2 22 23 21   BUN 41* 40* 42*   CR 1.99* 1.99* 2.09*   ANIONGAP 7 7 7   KEV 8.6 8.6  8.6   * 90 127*     No results found for this or any previous visit (from the past 24 hour(s)).

## 2020-11-17 NOTE — PROGRESS NOTES
"Care Management Follow Up    Length of Stay (days): 34    Expected Discharge Date: (Late November when [facility] has a vacancy.)     Concerns to be Addressed: (patient would like to move into an jail in [place])  (The [facility] is recommending a commitment to [facility] )  Patient plan of care discussed at interdisciplinary rounds: Yes    Anticipated Discharge Disposition: ([facility] will provide)     Anticipated Discharge Services:    Anticipated Discharge DME:      Patient/family educated on Medicare website which has current facility and service quality ratings: (n/a)  Education Provided on the Discharge Plan:    Patient/Family in Agreement with the Plan: (pt. not in agreement with Court Order)    Referrals Placed by CM/SW:    Private pay costs discussed:     Additional Information:  Writer placed a call to Karey at [facility] admissions at [phone]. [phone] requesting update on admission for patient.  Also met with patient at his request.  He requested information about [facility]. Writer printed on line information.  During our meeting, patient again voiced strong opposition to [facility].  He does not believe an further treatment will change him and shares that his CM Omayra Angela said the same thing during their first conversation during this current hospital stay. He believes the change needs to come from him and if discharged home he plans to maintain sobriety.   Writer shared he has had failure before when discharged home but he states this time is different,\" I know I have to stop\".  He also challenges going to [facility] as it is not listed in the court order.  Writer explained the court order is to the Commissioner of Human Services and the staff in this State agency has the authority to determine his disposition.    Patient continues to request disposition to home stating this time he will not drink sharing that he was humiliated by his incontinence at home.  He states he could be put on a monitoring " program at home.  He is upset because he feels he has no say or any control over what happens to him at this time.  Writer encouraged patient to call his court appointed .  He states he did call yesterday and left a message.  It is writer's understanding that patient voiced his strong opposition to treatment during his trial.  Patient articulates that he feels no one is listening to him.  He doesn't see that his past history is a strong reason for denial of his current request rather he see's the past as the past and believes he can discharge home and maintain sobriety.   Writer volunteered to call Omayra Coreen and review his strong opposition and ask why [facility] is not listed in the Court Order.          Earline Mauricio, TANASW

## 2020-11-17 NOTE — PLAN OF CARE
DATE & TIME: 11/16/2020 8045-6825    Cognitive Concerns/ Orientation : Pt A/Ox4   BEHAVIOR & AGGRESSION TOOL COLOR: Green   ABNL VS/O2: VSS on RA  MOBILITY: Independent  PAIN MANAGMENT: Denies  DIET: Regular  BOWEL/BLADDER: Continent  ABNL LAB/BG: Cr trending down 1.99  DRAIN/DEVICES: No IV access  SKIN: Bruised, scabbing. Edema bilateral lower extremities +1.  D/C DAY/GOALS/PLACE: Discharge pending progress, commitment in process

## 2020-11-17 NOTE — PLAN OF CARE
DATE & TIME: 11/16/2020 1732-9335   Cognitive Concerns/ Orientation : A/Ox4   BEHAVIOR & AGGRESSION TOOL COLOR: Green   ABNL VS/O2: VSS on RA  MOBILITY: Independent  PAIN MANAGMENT: Denies  DIET: Regular diet, good appetite  BOWEL/BLADDER: Continent  ABNL LAB/BG: Cr 1.99  DRAIN/DEVICES: No IV access  SKIN: Bruised, scabs. +1 edema bilateral lower extremities  D/C DAY/GOALS/PLACE: Discharge pending progress, commitment process.   OTHER IMPORTANT INFO: PRN seroquel given x1.

## 2020-11-18 VITALS
BODY MASS INDEX: 29.6 KG/M2 | RESPIRATION RATE: 16 BRPM | DIASTOLIC BLOOD PRESSURE: 72 MMHG | WEIGHT: 189 LBS | SYSTOLIC BLOOD PRESSURE: 137 MMHG | HEART RATE: 81 BPM | OXYGEN SATURATION: 97 % | TEMPERATURE: 98.9 F

## 2020-11-18 LAB
ANION GAP SERPL CALCULATED.3IONS-SCNC: 7 MMOL/L (ref 3–14)
BUN SERPL-MCNC: 42 MG/DL (ref 7–30)
CALCIUM SERPL-MCNC: 8.7 MG/DL (ref 8.5–10.1)
CHLORIDE SERPL-SCNC: 109 MMOL/L (ref 94–109)
CO2 SERPL-SCNC: 21 MMOL/L (ref 20–32)
CREAT SERPL-MCNC: 2.1 MG/DL (ref 0.66–1.25)
GFR SERPL CREATININE-BSD FRML MDRD: 32 ML/MIN/{1.73_M2}
GLUCOSE SERPL-MCNC: 161 MG/DL (ref 70–99)
POTASSIUM SERPL-SCNC: 3.9 MMOL/L (ref 3.4–5.3)
SODIUM SERPL-SCNC: 137 MMOL/L (ref 133–144)

## 2020-11-18 PROCEDURE — 36415 COLL VENOUS BLD VENIPUNCTURE: CPT | Performed by: INTERNAL MEDICINE

## 2020-11-18 PROCEDURE — 250N000013 HC RX MED GY IP 250 OP 250 PS 637: Performed by: INTERNAL MEDICINE

## 2020-11-18 PROCEDURE — 250N000013 HC RX MED GY IP 250 OP 250 PS 637: Performed by: PSYCHIATRY & NEUROLOGY

## 2020-11-18 PROCEDURE — 99239 HOSP IP/OBS DSCHRG MGMT >30: CPT | Performed by: STUDENT IN AN ORGANIZED HEALTH CARE EDUCATION/TRAINING PROGRAM

## 2020-11-18 PROCEDURE — 250N000013 HC RX MED GY IP 250 OP 250 PS 637: Performed by: STUDENT IN AN ORGANIZED HEALTH CARE EDUCATION/TRAINING PROGRAM

## 2020-11-18 PROCEDURE — 80048 BASIC METABOLIC PNL TOTAL CA: CPT | Performed by: INTERNAL MEDICINE

## 2020-11-18 RX ORDER — FOLIC ACID 1 MG/1
1 TABLET ORAL DAILY
Qty: 30 TABLET | Refills: 0 | Status: ON HOLD | OUTPATIENT
Start: 2020-11-19 | End: 2021-03-11

## 2020-11-18 RX ORDER — MIRTAZAPINE 15 MG/1
15 TABLET, FILM COATED ORAL AT BEDTIME
Qty: 30 TABLET | Refills: 0 | Status: ON HOLD | OUTPATIENT
Start: 2020-11-18 | End: 2021-01-22

## 2020-11-18 RX ORDER — NICOTINE 21 MG/24HR
1 PATCH, TRANSDERMAL 24 HOURS TRANSDERMAL DAILY
Qty: 30 PATCH | Refills: 0 | Status: SHIPPED | OUTPATIENT
Start: 2020-11-19 | End: 2021-06-06

## 2020-11-18 RX ORDER — NADOLOL 40 MG/1
40 TABLET ORAL DAILY
Qty: 30 TABLET | Refills: 0 | Status: ON HOLD | OUTPATIENT
Start: 2020-11-19 | End: 2021-01-22

## 2020-11-18 RX ORDER — ACETAMINOPHEN 325 MG/1
650 TABLET ORAL EVERY 4 HOURS PRN
Qty: 100 TABLET | Refills: 0 | Status: ON HOLD | OUTPATIENT
Start: 2020-11-18 | End: 2021-03-17

## 2020-11-18 RX ORDER — MULTIPLE VITAMINS W/ MINERALS TAB 9MG-400MCG
1 TAB ORAL DAILY
Qty: 30 TABLET | Refills: 0 | Status: ON HOLD | OUTPATIENT
Start: 2020-11-19 | End: 2021-08-22

## 2020-11-18 RX ORDER — QUETIAPINE FUMARATE 100 MG/1
100 TABLET, FILM COATED ORAL 2 TIMES DAILY WITH MEALS
Qty: 60 TABLET | Refills: 0 | Status: ON HOLD | OUTPATIENT
Start: 2020-11-18 | End: 2021-01-05

## 2020-11-18 RX ORDER — HYDROXYZINE HYDROCHLORIDE 50 MG/1
50 TABLET, FILM COATED ORAL
Qty: 30 TABLET | Refills: 0 | Status: ON HOLD | OUTPATIENT
Start: 2020-11-18 | End: 2021-06-17

## 2020-11-18 RX ORDER — MAGNESIUM OXIDE 400 MG/1
400 TABLET ORAL DAILY
Qty: 30 TABLET | Refills: 0 | Status: ON HOLD | OUTPATIENT
Start: 2020-11-18 | End: 2021-03-11

## 2020-11-18 RX ORDER — MIDODRINE HYDROCHLORIDE 5 MG/1
5 TABLET ORAL
Qty: 90 TABLET | Refills: 0 | Status: ON HOLD | OUTPATIENT
Start: 2020-11-18 | End: 2021-01-27

## 2020-11-18 RX ORDER — POLYETHYLENE GLYCOL 3350 17 G
2 POWDER IN PACKET (EA) ORAL
Qty: 144 LOZENGE | Refills: 0 | Status: SHIPPED | OUTPATIENT
Start: 2020-11-18 | End: 2021-06-06

## 2020-11-18 RX ORDER — QUETIAPINE FUMARATE 50 MG/1
50 TABLET, FILM COATED ORAL DAILY PRN
Qty: 30 TABLET | Refills: 0 | Status: ON HOLD | OUTPATIENT
Start: 2020-11-18 | End: 2021-01-22

## 2020-11-18 RX ORDER — QUETIAPINE FUMARATE 200 MG/1
200 TABLET, FILM COATED ORAL AT BEDTIME
Qty: 30 TABLET | Refills: 0 | Status: ON HOLD | OUTPATIENT
Start: 2020-11-18 | End: 2021-01-22

## 2020-11-18 RX ORDER — FUROSEMIDE 20 MG
10 TABLET ORAL DAILY
Qty: 15 TABLET | Refills: 0 | Status: ON HOLD | OUTPATIENT
Start: 2020-11-18 | End: 2021-01-27

## 2020-11-18 RX ORDER — LIDOCAINE 4 G/G
1 PATCH TOPICAL EVERY 24 HOURS
Qty: 30 PATCH | Refills: 0 | Status: ON HOLD | OUTPATIENT
Start: 2020-11-18 | End: 2021-01-22

## 2020-11-18 RX ORDER — PANTOPRAZOLE SODIUM 40 MG/1
40 TABLET, DELAYED RELEASE ORAL DAILY
Qty: 30 TABLET | Refills: 0 | Status: ON HOLD | OUTPATIENT
Start: 2020-11-18 | End: 2021-01-27

## 2020-11-18 RX ORDER — TAMSULOSIN HYDROCHLORIDE 0.4 MG/1
0.4 CAPSULE ORAL DAILY
Qty: 30 CAPSULE | Refills: 0 | Status: ON HOLD | OUTPATIENT
Start: 2020-11-19 | End: 2021-01-27

## 2020-11-18 RX ADMIN — QUETIAPINE FUMARATE 100 MG: 100 TABLET ORAL at 16:23

## 2020-11-18 RX ADMIN — QUETIAPINE FUMARATE 50 MG: 50 TABLET ORAL at 11:31

## 2020-11-18 RX ADMIN — MAGNESIUM OXIDE 400 MG: 400 TABLET ORAL at 07:55

## 2020-11-18 RX ADMIN — TAMSULOSIN HYDROCHLORIDE 0.4 MG: 0.4 CAPSULE ORAL at 07:55

## 2020-11-18 RX ADMIN — FOLIC ACID 1 MG: 1 TABLET ORAL at 07:55

## 2020-11-18 RX ADMIN — FUROSEMIDE 10 MG: 20 TABLET ORAL at 07:55

## 2020-11-18 RX ADMIN — CARBIDOPA AND LEVODOPA 7.5 MG: 50; 200 TABLET, EXTENDED RELEASE ORAL at 11:31

## 2020-11-18 RX ADMIN — VORTIOXETINE 10 MG: 10 TABLET, FILM COATED ORAL at 07:55

## 2020-11-18 RX ADMIN — MULTIPLE VITAMINS W/ MINERALS TAB 1 TABLET: TAB at 07:55

## 2020-11-18 RX ADMIN — PANTOPRAZOLE SODIUM 40 MG: 40 TABLET, DELAYED RELEASE ORAL at 07:54

## 2020-11-18 RX ADMIN — NICOTINE 1 PATCH: 21 PATCH, EXTENDED RELEASE TRANSDERMAL at 07:54

## 2020-11-18 RX ADMIN — CARBIDOPA AND LEVODOPA 7.5 MG: 50; 200 TABLET, EXTENDED RELEASE ORAL at 16:23

## 2020-11-18 RX ADMIN — QUETIAPINE FUMARATE 100 MG: 100 TABLET ORAL at 07:54

## 2020-11-18 RX ADMIN — CARBIDOPA AND LEVODOPA 7.5 MG: 50; 200 TABLET, EXTENDED RELEASE ORAL at 07:54

## 2020-11-18 ASSESSMENT — ACTIVITIES OF DAILY LIVING (ADL)
ADLS_ACUITY_SCORE: 11

## 2020-11-18 NOTE — PLAN OF CARE
DATE & TIME: 11/18/2020 2924-5361   Cognitive Concerns/ Orientation : Pt A/Ox4   BEHAVIOR & AGGRESSION TOOL COLOR: Green   ABNL VS/O2: VSS on RA, bradycardic at times  MOBILITY: Independent  PAIN MANAGMENT: Denies  DIET: Regular  BOWEL/BLADDER: Continent  DRAIN/DEVICES: No IV access  SKIN: Bruised, scabs. Bilateral lower extremity edema +1. Abdomen distended.  D/C DAY/GOALS/PLACE: Discharge pending placement, SW following[place] in agreement for commitment, awaiting for bed at [facility].

## 2020-11-18 NOTE — PLAN OF CARE
DATE & TIME: 11/17/2020 1900-2300    Cognitive Concerns/ Orientation : Pt A/Ox4   BEHAVIOR & AGGRESSION TOOL COLOR: Green   ABNL VS/O2: VSS on RA  MOBILITY: Independent  PAIN MANAGMENT: Denies  DIET: Regular  BOWEL/BLADDER: Continent  DRAIN/DEVICES: No IV access  SKIN: Bruised, scabs. Bilateral lower extremity edema +1.  D/C DAY/GOALS/PLACE: Discharge pending placement.

## 2020-11-18 NOTE — PROGRESS NOTES
Care Management Discharge Note    Discharge Date: 11/18/20  Expected Time of Departure: 8:00 pm    Discharge Disposition: ([facility] will provide)    Discharge Services:      Discharge DME:      Discharge Transportation: other (see comments)    Private pay costs discussed:     PAS Confirmation Code:    Patient/family educated on Medicare website which has current facility and service quality ratings: (n/a)    Education Provided on the Discharge Plan:    Persons Notified of Discharge Plans: yes  Patient/Family in Agreement with the Plan: (pt. not in agreement with Court Order)  Not in agreement with placement at [facility] or to [facility].    Handoff Referral Completed: Yes    Additional Information:  Nurse Troy at [facility] () [facility] located in [place] at [address] has accepted patient for today.  Their phone number is [phone].  Writer reviewed this discharge option with hospitalist and he supports the plan and will speak with patient.   Bedside nurse updated.    Writer spoke with Shu Lenard thru [facility] at [phone].  She supports patient transferring to the  [facility]al Management facility while patient awaits admission to [facility] CD program.  Ms Weinberg reports patient's transfer to the  [facility] facility is considered a continuance of patient's hospitalization and a provisional discharge is not needed.  She will complete a provisional discharge when patient discharges from  Withdrawal Management facility and admits to [facility].  Omayra Angela, [phone], the [facility] is aware of the plan and in agreement.     The  [facility]drawal Management facility requests a 30 day supply of medication be sent with patient and that patient arrive after 1700.  Because patient is under a Court Ordered Commitment, [facility] will want to transport patient by stretcher/BLS. Their next available ride is at 8:00 pm.  The PCS and  face sheet have been faxed to [facility] transport. Troy was notified of the transport time and is fine with this.     Update at 1648  Patient is expressing much anger at the transfer to  [facility].  He feels he has no rights.  Writer did validate his feelings explaining he is under Court Order to enter CD treatment and until the [facility] program is available the Commissioner of Human Services supports patient staying at  Withdrawal Management.  This explanation does not give patient any comfort and he continues to express anger at writer and his CM Omayra Angela.   Five days worth of medication are being sent with patient and tomorrow writer will coordinate arranging for the rest of his 30 day supply be filled by his In-network pharmacy, [facility].        Earline Mauricio, TANASW

## 2020-11-18 NOTE — PLAN OF CARE
DATE & TIME: 11/17/2020 0528-5487   Cognitive Concerns/ Orientation : Pt A/Ox4   BEHAVIOR & AGGRESSION TOOL COLOR: Green   ABNL VS/O2: VSS on RA,  s-150 s/60 s, HR in low 60s  MOBILITY: Independent  PAIN MANAGMENT: Denies  DIET: Regular  BOWEL/BLADDER: Continent  ABNL LAB/BG: Cr  1.99  DRAIN/DEVICES: No IV access  SKIN: Bruised, scabbing. Edema bilateral lower extremities +1.  D/C DAY/GOALS/PLACE: Discharge  pending  detox placement., SW following.

## 2020-11-18 NOTE — PLAN OF CARE
DATE & TIME: 11/18/2020 4921-0124   Cognitive Concerns/ Orientation : Pt A/Ox4   BEHAVIOR & AGGRESSION TOOL COLOR: Green   ABNL VS/O2: VSS on RA, bradycardic at times  MOBILITY: Independent  PAIN MANAGMENT: Denies  DIET: Regular  BOWEL/BLADDER: Continent  DRAIN/DEVICES: No IV access  SKIN: Bruised, scabs. Bilateral lower extremity edema +1. Abdomen distended.  D/C DAY/GOALS/PLACE:  SW following. Plan to discharge to  withdrawal facility today, ride set up for 8pm.

## 2020-11-19 NOTE — PROGRESS NOTES
Pt picked up via transport to go to [facility]. All belongings given to transport along with all medications and paperwork.

## 2020-11-19 NOTE — DISCHARGE SUMMARY
[facility]  Hospitalist Discharge Summary      Date of Admission:  10/14/2020  Date of Discharge:  11/18/2020  Discharging Provider: Jeffrye Villagomez MD      Discharge Diagnoses      Depression, anxiety, suicidal ideation   Acute alcohol intoxication  Severe alcohol use disorder   Alcoholic cirrhosis with history of esophageal varices and ascites   Bradycardia  Mild hyponatremia  Lactic acidosis secondary to above  C. difficile colitis. Resolved   NGUYEN on CKD stage III. Resolved   Chronic anemia, nl MCV, stable  BPH  JANIS  Malnutrition, non-severe    Follow-ups Needed After Discharge   Follow-up Appointments     Follow-up and recommended labs and tests       Follow up with Detox providers as needed.     Follow up with alcohol treatment           Unresulted Labs Ordered in the Past 30 Days of this Admission     No orders found from 9/14/2020 to 10/15/2020.      These results will be followed up by N/A    Discharge Disposition   Discharged to detox in [place]  Condition at discharge: Stable    Hospital Course   Jim Richard is a 65 year old male was admitted on 10/14/2020 with PMH use disorder, cirrhosis, depression, recent hospitalization for intentional overdose, multiple hospitalization for intoxication who is being admitted again on 10/14/2020, due to continued drinking and suicidal ideation, stating that he would take all of his medications at once.  Recently hospitalized from 10/6 - 10/11 for alcohol intoxication with metabolic acidosis and C. difficile.  After discharge he states he stopped taking all of his medications and has been drinking because he does not care whether he lives or dies. Patient was placed on hold on admission and petition for commitment filed and supported. Patient is now under commitment with plan for inpatient treatment. He is medically stable to leave the hospital and will discharge to Detox in [place] until bed becomes available bed available at [facility].     Depression, anxiety  Suicidal ideation, resolve  Acute alcohol intoxication, resolved  Severe alcohol use disorder   Multiple recent admission for intoxication at risk of life-threateningillness/death  Patient has been through treatment multiple times in the past. He states he stating drinking again right after discharge. BAL 0.36 at admission.    - Psychiatry was consulted  - Patient under commitment due to his recidivism, inability to maintain sobriety, multiple admissions secondary to alcohol-related issues and expressing suicidal ideation while intoxicated  - meds as below per psych     Alcoholic cirrhosis with history of esophageal varices and ascites   History of GI bleed.  Grade 1 esophageal varices with portal gastropathy. He c/o diffuse abdominal discomfort and has obvious fluid wave on exam with diffuse mild tenderness on exam. Last paracentesis on 10/9 was negative for SBP and patient currently refusing paracentesis stating pain no different now than prior to that procedure. S/p paracentesis 10/19.   - Low dose lasix  - spironolactone discontinued  - Continue PPI   - PTA Atenolol stopped  - Nadolol started 10/29/2020.  Hold parameters for heart rate less than 55.  - Outpatient follow up [facility].  Can consider TIPS in the future      Bradycardia  Suspect due to beta blocker use   - Hold parameters for Nadolol in place for heart rate less than 55  - Atenolol has been discontinued   - Continue to monitor      Mild hyponatremia, resolved  Sodium stable in the low 130s -> 134  Resolved. Na wnl last 3 days  - Continue to monitor      Lactic acidosis, resolved  Likely related to liver disease      C. difficile colitis. Resolved   Diagnosed during his last admission CT A/P with circumferential wall thickening of rectum suggesting distal colitis/proctitis. procalcitonin low at 0.07. WBC 7.7. Afebrile.  Not taking vancomycin and currently having 2-3 loose stools a day on admission.  - Finished a course  of oral vancomycin 125 mg every 6 hours     NGUYEN on CKD stage III. Resolved   Cr 1.63 on admission , Baseline creatinine 1.4-1.9.  Cr had bumped up to 2.1 over the past week, likely due to diuretics     Chronic anemia, nl MCV, stable  Hemoglobins initially in the 10s.  Have drifted down slightly to 8-9     BPH  - Continue flomax     JANIS  - Home CPAP ordered     Malnutrition  - Level of malnutrition: Non-Severe   - Based on: mild (or greater) subcutaneous fat loss, mild (or greater) muscle loss    Consultations This Hospital Stay   PSYCHIATRY IP CONSULT  PSYCHIATRY IP CONSULT  PHARMACY IP CONSULT  CARE MANAGEMENT / SOCIAL WORK IP CONSULT  CHEMICAL DEPENDENCY IP CONSULT  PSYCHIATRY IP CONSULT  PSYCHIATRY IP CONSULT  PHYSICAL THERAPY ADULT IP CONSULT  OCCUPATIONAL THERAPY ADULT IP CONSULT  PSYCHIATRY IP CONSULT  NUTRITION SERVICES ADULT IP CONSULT  PSYCHIATRY IP CONSULT  GASTROENTEROLOGY IP CONSULT  PSYCHIATRY IP CONSULT  PSYCHIATRY IP CONSULT  CHEMICAL DEPENDENCY IP CONSULT    Code Status   Full Code    Time Spent on this Encounter   I, Jeffrey Villagomez MD, personally saw the patient today and spent greater than 30 minutes discharging this patient.       Jeffrey Villagomez MD  [place] [address] [address]  [facility] [place] [address]  Phone: [phone]  ______________________________________________________________________    Physical Exam   Vital Signs: Temp: 98.9  F (37.2  C) Temp src: Oral BP: 137/72 Pulse: 81   Resp: 16 SpO2: 97 % O2 Device: None (Room air)    Weight: 189 lbs 0 oz  Constitutional: Awake, alert, cooperative, no apparent cardiopulmonary distress.  Eyes: Conjunctiva and pupils examined and normal.  HEENT: Moist mucous membranes, normal dentition.  Respiratory: Clear to auscultation bilaterally, no crackles or wheezing.  Cardiovascular: Regular rate and rhythm, normal S1 and S2, and no murmur noted.  GI: Soft, minimal distended, non-tender, normal bowel  sounds.  Lymph/Hematologic: No anterior cervical or supraclavicular adenopathy.  Skin: No rashes, no cyanosis, no edema noted on exposed skin.  Musculoskeletal: No joint swelling, erythema or tenderness. No gross bony abnormalities  Neurologic: Cranial nerves 2-12 grossly intact, normal strength and sensation.  Psychiatric: Alert, oriented to person, place and time, no obvious anxiety or depression.         Primary Care Physician   FERNANDA BRANTLEY    Discharge Orders      Reason for your hospital stay    You were in the hospital due to alcohol intoxication. The court is requiring that you go to treatment for alcohol use disorder     Follow-up and recommended labs and tests     Follow up with Detox providers as needed.     Follow up with alcohol treatment     Activity    Your activity upon discharge: activity as tolerated     Full Code     Diet    Follow this diet upon discharge: Orders Placed This Encounter      Regular Diet Adult       Significant Results and Procedures   Most Recent 3 CBC's:  Recent Labs   Lab Test 10/25/20  0757 10/21/20  0912 10/19/20  1007   WBC 3.3* 5.1 6.0   HGB 8.6* 9.1* 8.6*   MCV 90 92 92    179 153     Most Recent 3 BMP's:  Recent Labs   Lab Test 11/18/20  0823 11/17/20  0857 11/16/20  1150    138 139   POTASSIUM 3.9 4.2 4.4   CHLORIDE 109 109 109   CO2 21 22 23   BUN 42* 41* 40*   CR 2.10* 1.99* 1.99*   ANIONGAP 7 7 7   KEV 8.7 8.6 8.6   * 146* 90     Most Recent 2 LFT's:  Recent Labs   Lab Test 10/29/20  0938 10/19/20  1007   AST 22 28   ALT 20 19   ALKPHOS 173* 156*   BILITOTAL 0.3 0.5   ,   Results for orders placed or performed during the hospital encounter of 10/14/20   US Paracentesis    Narrative    US PARACENTESIS 10/19/2020 11:47 AM    CLINICAL HISTORY: HIGH VOLUME paracentesis with or without diagnostic  fluid analysis with labs to be drawn if ordered. Total paracentesis  volume as much as possible.    PROCEDURE: Informed consent obtained. Time out  performed. The abdomen  was prepped and draped in a sterile fashion. 10 mL of 1% lidocaine was  infused into local soft tissues. A 5 French catheter system was  introduced into the abdominal ascites under ultrasound guidance.    3.6 liters of clear fluid were removed and sent to lab if requested.    Patient tolerated procedure well.    Ultrasound imaging was obtained and placed in the patient's permanent  medical record.      Impression    IMPRESSION:  1.  Status post ultrasound-guided paracentesis.    ANIL KERN MD   US Paracentesis    Narrative    US PARACENTESIS 11/3/2020 3:21 PM    CLINICAL HISTORY: HIGH VOLUME paracentesis with or without diagnostic  fluid analysis with labs to be drawn if ordered. Total paracentesis  volume as much as possible.    PROCEDURE: Informed consent obtained. Time out performed. The abdomen  was prepped and draped in a sterile fashion. 10 mL of 1% lidocaine was  infused into local soft tissues. A 5 French catheter system was  introduced into the abdominal ascites under ultrasound guidance.    1.8 liters of clear fluid were removed and sent to lab if requested.    Patient tolerated procedure well.    Ultrasound imaging was obtained and placed in the patient's permanent  medical record.      Impression    IMPRESSION:  1.  Status post ultrasound-guided paracentesis.    MACKENZIE DYE MD       Discharge Medications   Current Discharge Medication List      START taking these medications    Details   acetaminophen (TYLENOL) 325 MG tablet Take 2 tablets (650 mg) by mouth every 4 hours as needed for mild pain  Qty: 100 tablet, Refills: 0    Associated Diagnoses: Alcoholic intoxication without complication (H)      Lidocaine (LIDOCARE) 4 % Patch Place 1 patch onto the skin every 24 hours To prevent lidocaine toxicity, patient should be patch free for 12 hrs daily.  Qty: 30 patch, Refills: 0    Associated Diagnoses: Alcoholic intoxication without complication (H)      midodrine (PROAMATINE)  5 MG tablet Take 1 tablet (5 mg) by mouth 3 times daily (with meals)  Qty: 90 tablet, Refills: 0    Associated Diagnoses: Alcoholic intoxication without complication (H)      nadolol (CORGARD) 40 MG tablet Take 1 tablet (40 mg) by mouth daily  Qty: 30 tablet, Refills: 0    Associated Diagnoses: Alcoholic intoxication without complication (H)      nicotine (COMMIT) 2 MG lozenge Place 1 lozenge (2 mg) inside cheek every hour as needed for smoking cessation  Qty: 144 lozenge, Refills: 0    Associated Diagnoses: Alcoholic intoxication without complication (H)      nicotine (NICODERM CQ) 21 MG/24HR 24 hr patch Place 1 patch onto the skin daily  Qty: 30 patch, Refills: 0    Associated Diagnoses: Alcoholic intoxication without complication (H)         CONTINUE these medications which have CHANGED    Details   fluticasone-vilanterol (BREO ELLIPTA) 200-25 MCG/INH inhaler Inhale 1 puff into the lungs daily as needed (SOB)  Qty: 28 each, Refills: 0    Associated Diagnoses: Alcoholic intoxication without complication (H)      folic acid (FOLVITE) 1 MG tablet Take 1 tablet (1 mg) by mouth daily  Qty: 30 tablet, Refills: 0    Associated Diagnoses: Alcoholic intoxication without complication (H)      furosemide (LASIX) 20 MG tablet Take 0.5 tablets (10 mg) by mouth daily  Qty: 15 tablet, Refills: 0    Associated Diagnoses: Alcoholic intoxication without complication (H)      hydrOXYzine (ATARAX) 50 MG tablet Take 1 tablet (50 mg) by mouth nightly as needed for other (sleep)  Qty: 30 tablet, Refills: 0    Associated Diagnoses: Alcoholic intoxication without complication (H)      magnesium oxide (MAG-OX) 400 MG tablet Take 1 tablet (400 mg) by mouth daily  Qty: 30 tablet, Refills: 0    Associated Diagnoses: Alcoholic intoxication without complication (H)      mirtazapine (REMERON) 15 MG tablet Take 1 tablet (15 mg) by mouth At Bedtime  Qty: 30 tablet, Refills: 0    Associated Diagnoses: Alcoholic intoxication without complication  (H)      multivitamin w/minerals (THERA-VIT-M) tablet Take 1 tablet by mouth daily  Qty: 30 tablet, Refills: 0    Associated Diagnoses: Alcoholic intoxication without complication (H)      pantoprazole (PROTONIX) 40 MG EC tablet Take 1 tablet (40 mg) by mouth daily  Qty: 30 tablet, Refills: 0    Associated Diagnoses: Alcoholic intoxication without complication (H)      !! QUEtiapine (SEROQUEL) 100 MG tablet Take 1 tablet (100 mg) by mouth 2 times daily (with meals)  Qty: 60 tablet, Refills: 0    Associated Diagnoses: Alcoholic intoxication without complication (H)      !! QUEtiapine (SEROQUEL) 200 MG tablet Take 1 tablet (200 mg) by mouth At Bedtime  Qty: 30 tablet, Refills: 0    Associated Diagnoses: Alcoholic intoxication without complication (H)      !! QUEtiapine (SEROQUEL) 50 MG tablet Take 1 tablet (50 mg) by mouth daily as needed (anxiety)  Qty: 30 tablet, Refills: 0    Associated Diagnoses: Alcoholic intoxication without complication (H)      tamsulosin (FLOMAX) 0.4 MG capsule Take 1 capsule (0.4 mg) by mouth daily  Qty: 30 capsule, Refills: 0    Associated Diagnoses: Alcoholic intoxication without complication (H)      vortioxetine (TRINTELLIX) 10 MG tablet Take 1 tablet (10 mg) by mouth daily  Qty: 30 tablet, Refills: 0    Associated Diagnoses: Severe episode of recurrent major depressive disorder, without psychotic features (H)       !! - Potential duplicate medications found. Please discuss with provider.      STOP taking these medications       atenolol (TENORMIN) 25 MG tablet Comments:   Reason for Stopping:         methocarbamol (ROBAXIN) 500 MG tablet Comments:   Reason for Stopping:         potassium chloride ER (KLOR-CON M) 10 MEQ CR tablet Comments:   Reason for Stopping:         spironolactone (ALDACTONE) 25 MG tablet Comments:   Reason for Stopping:         traZODone (DESYREL) 100 MG tablet Comments:   Reason for Stopping:         vancomycin (VANCOCIN) 125 MG capsule Comments:   Reason for  Stopping:             Allergies   Allergies   Allergen Reactions     Amlodipine Swelling     Lisinopril      Other reaction(s): Angioedema  Mouth and tongue swelling   Mouth and tongue swelling

## 2020-11-23 NOTE — PROGRESS NOTES
SW   Update to patient's transfer to  Withdrawal Management.  Patient was transferred from  Withdrawal Management over the weekend as the center was closed due to staffing issues related to COVID.  Patient was transferred to the [facility] on the same campus.  Today patient's behavioral , Omayra Angela called and stated patient is going to be allowed to discharge home where it is felt he has less exposure to COVID.  He and Ms Angela will talk daily.  Patient will stay at home until [facility] has a vacancy.   If patient returns to the hospital with intoxication or withdrawal, Omayra Angela should be contacted at [phone].

## 2020-11-28 ENCOUNTER — HOSPITAL ENCOUNTER (EMERGENCY)
Facility: CLINIC | Age: 65
Discharge: HOME | End: 2020-11-28
Attending: EMERGENCY MEDICINE | Admitting: EMERGENCY MEDICINE
Payer: MEDICARE

## 2020-11-28 VITALS
HEART RATE: 74 BPM | SYSTOLIC BLOOD PRESSURE: 131 MMHG | OXYGEN SATURATION: 96 % | TEMPERATURE: 98.2 F | RESPIRATION RATE: 16 BRPM | DIASTOLIC BLOOD PRESSURE: 56 MMHG

## 2020-11-28 DIAGNOSIS — F10.920 ALCOHOLIC INTOXICATION WITHOUT COMPLICATION (H): ICD-10-CM

## 2020-11-28 DIAGNOSIS — F32.A DEPRESSION, UNSPECIFIED DEPRESSION TYPE: ICD-10-CM

## 2020-11-28 PROCEDURE — 99285 EMERGENCY DEPT VISIT HI MDM: CPT | Mod: 25

## 2020-11-28 PROCEDURE — 250N000011 HC RX IP 250 OP 636: Performed by: EMERGENCY MEDICINE

## 2020-11-28 PROCEDURE — 90791 PSYCH DIAGNOSTIC EVALUATION: CPT

## 2020-11-28 PROCEDURE — 96374 THER/PROPH/DIAG INJ IV PUSH: CPT

## 2020-11-28 PROCEDURE — 250N000013 HC RX MED GY IP 250 OP 250 PS 637: Mod: GY | Performed by: EMERGENCY MEDICINE

## 2020-11-28 RX ORDER — LORAZEPAM 2 MG/ML
1 INJECTION INTRAMUSCULAR ONCE
Status: COMPLETED | OUTPATIENT
Start: 2020-11-28 | End: 2020-11-28

## 2020-11-28 RX ORDER — DIAZEPAM 5 MG
5 TABLET ORAL ONCE
Status: COMPLETED | OUTPATIENT
Start: 2020-11-28 | End: 2020-11-28

## 2020-11-28 RX ADMIN — DIAZEPAM 5 MG: 5 TABLET ORAL at 01:21

## 2020-11-28 RX ADMIN — LORAZEPAM 1 MG: 2 INJECTION INTRAMUSCULAR; INTRAVENOUS at 01:47

## 2020-11-28 ASSESSMENT — ENCOUNTER SYMPTOMS: BACK PAIN: 1

## 2020-11-28 NOTE — ED TRIAGE NOTES
Alcoholic.  Last drink x12 hours ago, and now feeling depressed.  Scratched his wrists with a knife.

## 2020-11-28 NOTE — ED PROVIDER NOTES
History     Chief Complaint:  Psychiatric Evaluation       The history is provided by the patient.     Jim Richard is a 65 year old male with a history of alcohol abuse, alcohol withdrawal, CAD, hypertension, chronic low back pain, and alcoholic cirrhosis among others who presents for psychiatric evaluation. The patient reports that he was drinking yesterday, though has not had a drink in approximately 12 hours. Last night, he began to feel increasingly suicidal and called EMS as he needed someone to talk to. Here, he states that he is not actively suicidal, though he was prior to calling EMS. He also notes lower back pain, though this is a chronic issue for him.       Allergies:  Amlodipine  Lisinopril      Medications:   Breo ellipta inhaler  Lasix  Hydroxyzine  Magnesium oxide  Midodrine  Mirtazapine  Nadolol  Nicotine  Protonix  Seroquel  Trintellix    Medical History:   Alcoholism   Anxiety   CAD  Depression   Esophageal varices with bleeding  Heart attack   Hepatomegaly  Hyperlipidemia   Hypertension   Obstructive sleep apnea  Spinal stenosis  Subdural hematoma  Peripheral vascular disease  Substance abuse  Peripheral artery disease  Suicidal ideation  COPD  GI bleed  Chronic low back pain  Anemia  Tremor  Alcoholic cirrhosis   Alcoholic ketoacidosis  Lactic acidosis  C difficile   Alcohol withdrawal    Surgical History   Appendectomy   Bypass graft femoropopliteal - left  Colonoscopy x3  EGD, combined x6   endarterectomy, femoral  Hernia repair - abdominal  Tonsillectomy & Adenoidectomy   Laminectomy, fusion lumbar three+ level, combined    Family History:   Mother -  CAD  Father -  Substance abuse, lung cancer  Brother - substance abuse    Social History:  The patient was accompanied to the ED by EMS.  Smoking Status: Current - 1.0 packs/day  Smokeless Tobacco: No  Alcohol Use: Yes   Drug Use: No   Marital Status:   PCP: Talon Elias        Review of Systems   Musculoskeletal:  Positive for back pain.   Psychiatric/Behavioral: Positive for suicidal ideas.   All other systems reviewed and are negative.        Physical Exam     Patient Vitals for the past 24 hrs:   BP Temp Temp src Resp SpO2   11/28/20 0200 -- -- -- -- 96 %   11/28/20 0100 (!) 143/65 98.2  F (36.8  C) Oral 16 97 %          Physical Exam  Vitals: reviewed by me  General: Pt seen on [facility], pleasant, cooperative, and alert to conversation, slightly intoxicate appearing   Eyes: Tracking well, clear conjunctiva BL  ENT: MMM, midline trachea.   Lungs:  No tachypnea, no accessory muscle use. No respiratory distress.   CV: Rate as above, regular rhythm.    Abd: Soft, non tender, no guarding, no rebound. Non distended  MSK: no peripheral edema or joint effusion.  No evidence of trauma  Skin: No rash, normal turgor and temperature  Neuro: Clear speech and no facial droop.  Psych: Not RIS, no e/o AH/VH, no SI or HI        Emergency Department Course     Interventions:   0121 Valium 5 mg PO  0147 Ativan 1 mg IV    Emergency Department Course:    Nursing notes and vitals reviewed.    0100 I performed an exam of the patient as documented above.     DEC  at bedside. Assessment in progress.      0222 Findings and plan explained to the Patient. Patient discharged home with instructions regarding supportive care, medications, and reasons to return. The importance of close follow-up was reviewed. The patient was prescribed as below.     Impression & Plan     Medical Decision Making:  This is a very pleasant 65-year-old male presents the emergency room with alcohol intoxication.  He also had some depression, but denies suicidality.  He was seen by mental health team and they also agreed he is not meeting criteria for a hold or for inpatient therapy.  Patient states he would like to go home, he is clinically sober and states he will take a cab back to his home.  He has his house keys.  He feels comfortable coming back to the ER  if he develops any suicidal thoughts, and has outpatient resources that have been given to him.  No other medical complaints at this time.  No signs of alcohol withdrawal.    Diagnosis:     ICD-10-CM    1. Alcoholic intoxication without complication (H)  F10.920    2. Depression, unspecified depression type  F32.9         Disposition:  Discharged to home.    Scribe Disclosure:  I, Augusta Ledbetter, am serving as a scribe at 12:55 AM on 11/28/2020 to document services personally performed by Arden Milan MD based on my observations and the provider's statements to me.      Arden Milan MD  11/28/20 0516

## 2020-11-28 NOTE — ED NOTES
Bed: ED18  Expected date:   Expected time:   Means of arrival:   Comments:  431 65M ETOH Depressed Hold

## 2020-11-28 NOTE — ED AVS SNAPSHOT
[facility]  [address] [place] [address]  Phone: [phone]  Fax: [phone]                                    Jim Richard   MRN: [id number]    Department: [facility]   Date of Visit: 11/28/2020           After Visit Summary Signature Page    I have received my discharge instructions, and my questions have been answered. I have discussed any challenges I see with this plan with the nurse or doctor.    ..........................................................................................................................................  Patient/Patient Representative Signature      ..........................................................................................................................................  Patient Representative Print Name and Relationship to Patient    ..................................................               ................................................  Date                                   Time    ..........................................................................................................................................  Reviewed by Signature/Title    ...................................................              ..............................................  Date                                               Time          22EPIC Rev 08/18

## 2020-12-02 ENCOUNTER — HOSPITAL ENCOUNTER (EMERGENCY)
Facility: CLINIC | Age: 65
Discharge: HOME | End: 2020-12-02
Attending: EMERGENCY MEDICINE | Admitting: EMERGENCY MEDICINE
Payer: MEDICARE

## 2020-12-02 ENCOUNTER — APPOINTMENT (OUTPATIENT)
Dept: GENERAL RADIOLOGY | Facility: CLINIC | Age: 65
End: 2020-12-02
Attending: EMERGENCY MEDICINE
Payer: MEDICARE

## 2020-12-02 VITALS
SYSTOLIC BLOOD PRESSURE: 147 MMHG | RESPIRATION RATE: 16 BRPM | TEMPERATURE: 98.4 F | BODY MASS INDEX: 28.19 KG/M2 | OXYGEN SATURATION: 97 % | HEART RATE: 73 BPM | WEIGHT: 180 LBS | DIASTOLIC BLOOD PRESSURE: 97 MMHG

## 2020-12-02 VITALS
DIASTOLIC BLOOD PRESSURE: 50 MMHG | HEART RATE: 66 BPM | TEMPERATURE: 98.6 F | SYSTOLIC BLOOD PRESSURE: 119 MMHG | RESPIRATION RATE: 16 BRPM | OXYGEN SATURATION: 98 %

## 2020-12-02 DIAGNOSIS — M53.3 ACUTE COCCYGEAL PAIN: ICD-10-CM

## 2020-12-02 DIAGNOSIS — F10.220 ACUTE ALCOHOLIC INTOXICATION IN ALCOHOLISM WITHOUT COMPLICATION (H): ICD-10-CM

## 2020-12-02 DIAGNOSIS — S62.201A CLOSED NONDISPLACED FRACTURE OF FIRST METACARPAL BONE OF RIGHT HAND, UNSPECIFIED PORTION OF METACARPAL, INITIAL ENCOUNTER: ICD-10-CM

## 2020-12-02 DIAGNOSIS — S61.411A SKIN TEAR OF RIGHT HAND WITHOUT COMPLICATION, INITIAL ENCOUNTER: ICD-10-CM

## 2020-12-02 LAB — ALCOHOL BREATH TEST: 0.21 (ref 0–0.01)

## 2020-12-02 PROCEDURE — 250N000009 HC RX 250: Performed by: EMERGENCY MEDICINE

## 2020-12-02 PROCEDURE — 250N000013 HC RX MED GY IP 250 OP 250 PS 637: Performed by: EMERGENCY MEDICINE

## 2020-12-02 PROCEDURE — 250N000011 HC RX IP 250 OP 636: Performed by: EMERGENCY MEDICINE

## 2020-12-02 PROCEDURE — 82075 ASSAY OF BREATH ETHANOL: CPT

## 2020-12-02 PROCEDURE — 73130 X-RAY EXAM OF HAND: CPT | Mod: RT

## 2020-12-02 PROCEDURE — 271N000002 HC RX 271: Performed by: EMERGENCY MEDICINE

## 2020-12-02 PROCEDURE — 99283 EMERGENCY DEPT VISIT LOW MDM: CPT | Mod: 27

## 2020-12-02 PROCEDURE — 72202 X-RAY EXAM SI JOINTS 3/> VWS: CPT

## 2020-12-02 PROCEDURE — 99284 EMERGENCY DEPT VISIT MOD MDM: CPT

## 2020-12-02 RX ORDER — METHYLCELLULOSE 4000CPS 30 %
POWDER (GRAM) MISCELLANEOUS ONCE
Status: COMPLETED | OUTPATIENT
Start: 2020-12-02 | End: 2020-12-02

## 2020-12-02 RX ORDER — LORAZEPAM 0.5 MG/1
1 TABLET ORAL ONCE
Status: DISCONTINUED | OUTPATIENT
Start: 2020-12-02 | End: 2020-12-02 | Stop reason: HOSPADM

## 2020-12-02 RX ADMIN — Medication 150 MG: at 00:27

## 2020-12-02 RX ADMIN — EPINEPHRINE BITARTRATE 3 ML: 1 POWDER at 00:27

## 2020-12-02 ASSESSMENT — ENCOUNTER SYMPTOMS
WOUND: 1
BACK PAIN: 1
FEVER: 0
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
## 2020-12-02 NOTE — ED NOTES
I called [facility].  Staff stated the nurse that would take this report is busy and asked me to call back in 20 minutes.

## 2020-12-02 NOTE — ED NOTES
I rec'd information re: this patient from the IP SW Bre BENNETT.  This patient is under full commitment to [facility].  Leana Angela RN  set up for this patient to go to [facility] this am and this patient stated he didn't feel good.  He was then brought here for a medical work up with the plan to go to [facility].  ER tried to arrange discharge to [facility] --see RN's notes. I rec'd the commitment paperwork from Omayra FELDER and gave them to the bedside RN.  The bedside RN is working with the ANS for discharge plan.  I updated the ER provider/ANS of the above.

## 2020-12-02 NOTE — ED TRIAGE NOTES
Pt was here last night drunk and is back drunk.  His  called the police and asked them to bring him to detox.  He had a bed there.  Detox refused to take him so they brought him here.   thought he took to many of his pills but EMS counted his pills and they were spot on the amount for amount he was suppose to take.  Pt told EMS that he took his normal amount of pills.

## 2020-12-02 NOTE — ED NOTES
Bed: [facility]  Expected date:   Expected time:   Means of arrival:   Comments:  [facility] [place] - 429 - 65 M ETOH no covid eta 8356

## 2020-12-02 NOTE — ED AVS SNAPSHOT
[facility] Emergency Dept  [address] [address]  Phone: [phone]  Fax: [phone]                                    Jim Richard   MRN: [id number]    Department: [facility] Emergency Dept   Date of Visit: 12/2/2020           After Visit Summary Signature Page    I have received my discharge instructions, and my questions have been answered. I have discussed any challenges I see with this plan with the nurse or doctor.    ..........................................................................................................................................  Patient/Patient Representative Signature      ..........................................................................................................................................  Patient Representative Print Name and Relationship to Patient    ..................................................               ................................................  Date                                   Time    ..........................................................................................................................................  Reviewed by Signature/Title    ...................................................              ..............................................  Date                                               Time          22EPIC Rev 08/18

## 2020-12-02 NOTE — ED NOTES
"I called [facility] back and spoke to the nurse, Timo.  He states he still doesn't have the commitment paperwork and is unaware of this patient.  He stated if someone is committed, the charge nurse and director have to review the case, and it could take up to a few days.   I updated Suzanne, from [facility], and Omayra Angela ([phone]) the .  I called Omayra again to tell her this may take days.  Rupert Clarke suggested to obtain the commitment paperwork and send it to [employer] to make them aware.  ANS made aware.  Meanwhile, I called [facility] [place] and asked if patient were to go voluntary, would they have a male bed.  Timo stated he couldn't accept him because he is under commitment.  I asked if he had the commitment paperwork, and he said yes, when before he denied having it.  He states, \"Well, I've had the paperwork, but it just hasn't been reviewed.\"  Suzanne explained to ANS he is on the list for a bed at [facility], and detox is a place to stay until he gets a bed. ANS will fax the commitment paperwork to [employer]: If okay with them, we will discharge patient home.  "
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
## 2020-12-02 NOTE — ED NOTES
Bed: ED19  Expected date:   Expected time:   Means of arrival:   Comments:  436  65M ETOH/Fell down stairs/Tail bone pain  0009
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
## 2020-12-02 NOTE — ED AVS SNAPSHOT
[facility] Emergency Dept  [address] [address]  Phone: [phone]  Fax: [phone]                                    Jim Richard   MRN: [id number]    Department: [facility] Emergency Dept   Date of Visit: 12/2/2020           After Visit Summary Signature Page    I have received my discharge instructions, and my questions have been answered. I have discussed any challenges I see with this plan with the nurse or doctor.    ..........................................................................................................................................  Patient/Patient Representative Signature      ..........................................................................................................................................  Patient Representative Print Name and Relationship to Patient    ..................................................               ................................................  Date                                   Time    ..........................................................................................................................................  Reviewed by Signature/Title    ...................................................              ..............................................  Date                                               Time          22EPIC Rev 08/18

## 2020-12-02 NOTE — ED NOTES
I called [facility].  The employee I spoke with states he is unaware of commitment paperwork, sent by Omayra, the .  He told me to call his director, Yue.  I called her, but no answer: voicemail left.  I called Omayra back.  She states she faxed commitment paperwork to [facility] [place], and verified via phone that they received it.  She will call them once more to verify they received this.

## 2020-12-02 NOTE — ED NOTES
"Has come out of the room several times yelling at staff.  I checked on him and he stated that Floyd is terrible.  \"I want an IV and an injection with the strong stuff, like last time.  That's what I came in for, and your not doing it.\"  He asked for another mask, and I gave him one.  He asked for juice and I gave him 2.  Then, he stated, \"You have a job to do.  You don't even do your job.  How come I had to put my own blanket on.  When my blanket comes off, you need to come put it back on.\"  He walked to the door and slammed it shut.  "

## 2020-12-02 NOTE — ED NOTES
Omayra called me back and stated she called and verified they have the commitment paperwork right now.  I will call in a couple minutes to give report.

## 2020-12-03 NOTE — DISCHARGE INSTRUCTIONS

## 2020-12-03 NOTE — ED NOTES
Omayra and RN spoke about disposition for pt. Omayra () reports if [facility] [place] will not take pt as voluntary she is okay with discharge. MD updated. ANS and risk management updated.

## 2020-12-03 NOTE — ED NOTES
Pt able to ambulate independently around BH holding are. Pt requesting ativan. Pt is not tremulous or diaphoretic, gait steady.

## 2020-12-05 NOTE — PROGRESS NOTES
SW Note  Jim is under a Full Commitment for Chemical Dependency thru [facility].  The commitment is to the Commissioner of Human Services and [facility].   Writer was involved with patient during the petition process and has been in conversation since his discharge with his [facility]  Omayra Angela.  On Friday 12/4, writer has been notified by his [facility]  Omayra Angela ([phone])  that the next time patient comes into the ED intoxicated to contact [facility].  If they have a bed available they will accept Jim and keep him there until the [facility] has a vacancy.  As of 12/4, [facility] expected a vacancy in 1-2 weeks.   If patient does present to ED, please call Omayra and if she doesn't answer please leave her a voice mail of his presentation and disposition from the ED.

## 2020-12-21 DIAGNOSIS — Z11.59 ENCOUNTER FOR SCREENING FOR OTHER VIRAL DISEASES: Primary | ICD-10-CM

## 2020-12-24 ENCOUNTER — TELEPHONE (OUTPATIENT)
Dept: MEDSURG UNIT | Facility: CLINIC | Age: 65
End: 2020-12-24

## 2020-12-24 DIAGNOSIS — Z11.59 ENCOUNTER FOR SCREENING FOR OTHER VIRAL DISEASES: ICD-10-CM

## 2020-12-24 PROCEDURE — U0003 INFECTIOUS AGENT DETECTION BY NUCLEIC ACID (DNA OR RNA); SEVERE ACUTE RESPIRATORY SYNDROME CORONAVIRUS 2 (SARS-COV-2) (CORONAVIRUS DISEASE [COVID-19]), AMPLIFIED PROBE TECHNIQUE, MAKING USE OF HIGH THROUGHPUT TECHNOLOGIES AS DESCRIBED BY CMS-2020-01-R: HCPCS | Performed by: FAMILY MEDICINE

## 2020-12-24 RX ORDER — DEXTROSE MONOHYDRATE 25 G/50ML
25-50 INJECTION, SOLUTION INTRAVENOUS
Status: CANCELLED | OUTPATIENT
Start: 2020-12-24

## 2020-12-24 RX ORDER — NICOTINE POLACRILEX 4 MG
15-30 LOZENGE BUCCAL
Status: CANCELLED | OUTPATIENT
Start: 2020-12-24

## 2020-12-24 RX ORDER — LIDOCAINE 40 MG/G
CREAM TOPICAL
Status: CANCELLED | OUTPATIENT
Start: 2020-12-24

## 2020-12-24 RX ORDER — ALBUMIN (HUMAN) 12.5 G/50ML
12.5 SOLUTION INTRAVENOUS ONCE
Status: CANCELLED | OUTPATIENT
Start: 2020-12-24 | End: 2020-12-24

## 2020-12-24 NOTE — TELEPHONE ENCOUNTER
Pre-Procedure COVID Test Results Pending    Results Reviewed  The patient has a pending COVID test result.  The patient has a COVID test scheduled on 12/24/2020 at 1140.     No COVID pre-call needed. RN to verify test results prior to procedure.     Brad Mccullough RN

## 2020-12-25 LAB
SARS-COV-2 RNA SPEC QL NAA+PROBE: NOT DETECTED
SPECIMEN SOURCE: NORMAL

## 2020-12-28 ENCOUNTER — HOSPITAL ENCOUNTER (OUTPATIENT)
Dept: ULTRASOUND IMAGING | Facility: CLINIC | Age: 65
End: 2020-12-28
Attending: FAMILY MEDICINE
Payer: MEDICARE

## 2020-12-28 ENCOUNTER — HOSPITAL ENCOUNTER (OUTPATIENT)
Facility: CLINIC | Age: 65
Discharge: HOME | End: 2020-12-28
Admitting: RADIOLOGY
Payer: MEDICARE

## 2020-12-28 VITALS
TEMPERATURE: 98.2 F | HEART RATE: 78 BPM | DIASTOLIC BLOOD PRESSURE: 90 MMHG | RESPIRATION RATE: 18 BRPM | OXYGEN SATURATION: 98 % | SYSTOLIC BLOOD PRESSURE: 166 MMHG

## 2020-12-28 DIAGNOSIS — K70.31 ASCITES DUE TO ALCOHOLIC CIRRHOSIS (H): ICD-10-CM

## 2020-12-28 LAB
INR PPP: 1.28 (ref 0.86–1.14)
PLATELET # BLD AUTO: 115 10E9/L (ref 150–450)

## 2020-12-28 PROCEDURE — 36415 COLL VENOUS BLD VENIPUNCTURE: CPT

## 2020-12-28 PROCEDURE — 999N000154 HC STATISTIC RADIOLOGY XRAY, US, CT, MAR, NM

## 2020-12-28 PROCEDURE — 49083 ABD PARACENTESIS W/IMAGING: CPT

## 2020-12-28 PROCEDURE — 85610 PROTHROMBIN TIME: CPT

## 2020-12-28 PROCEDURE — 85049 AUTOMATED PLATELET COUNT: CPT

## 2020-12-28 RX ORDER — LIDOCAINE HYDROCHLORIDE 10 MG/ML
10 INJECTION, SOLUTION EPIDURAL; INFILTRATION; INTRACAUDAL; PERINEURAL ONCE
Status: COMPLETED | OUTPATIENT
Start: 2020-12-28 | End: 2020-12-28

## 2020-12-28 RX ORDER — NICOTINE POLACRILEX 4 MG
15-30 LOZENGE BUCCAL
Status: DISCONTINUED | OUTPATIENT
Start: 2020-12-28 | End: 2020-12-28 | Stop reason: HOSPADM

## 2020-12-28 RX ORDER — DEXTROSE MONOHYDRATE 25 G/50ML
25-50 INJECTION, SOLUTION INTRAVENOUS
Status: DISCONTINUED | OUTPATIENT
Start: 2020-12-28 | End: 2020-12-28 | Stop reason: HOSPADM

## 2020-12-28 RX ORDER — ALBUMIN (HUMAN) 12.5 G/50ML
12.5 SOLUTION INTRAVENOUS ONCE
Status: DISCONTINUED | OUTPATIENT
Start: 2020-12-28 | End: 2020-12-28 | Stop reason: HOSPADM

## 2020-12-28 RX ORDER — LIDOCAINE 40 MG/G
CREAM TOPICAL
Status: DISCONTINUED | OUTPATIENT
Start: 2020-12-28 | End: 2020-12-28 | Stop reason: HOSPADM

## 2020-12-28 RX ADMIN — LIDOCAINE HYDROCHLORIDE 10 ML: 10 INJECTION, SOLUTION EPIDURAL; INFILTRATION; INTRACAUDAL; PERINEURAL at 12:52

## 2020-12-28 NOTE — PROGRESS NOTES
Care Suites Post Procedure Summary (without sedation)     Immediately prior to starting the procedure a Time Out was conducted with procedural staff and re-confirmed the patient s name, procedure, and site/side.      Consent obtained from patient after discussing the risks, benefits and alternatives.      Procedure: paracentesis    Procedure Interventions:    Fluid (cc) removed: Yes. 5500 ml of yellow fluid removed from right abdomen.     Tube/Drain placed: NA.    Patient tolerance: Patient tolerated the procedure well with no immediate complications.  Site dressed by tech with band aid at completion. No bleeding or drainage noted.    Post-procedure report given to:  RN and pt transferred to care suites 22 by cart.    (See Doc Flow-sheets and MAR for additional information)

## 2020-12-28 NOTE — PROGRESS NOTES
RADIOLOGY PROCEDURE NOTE  Patient name: Jim Richard  MRN: [id number]  : 1955    Pre-procedure diagnosis: Ascites  Post-procedure diagnosis: Same    Procedure Date/Time: 2020  1:14 PM  Procedure: Paracentesis  Estimated blood loss: None  Specimen(s) collected with description: Ascites.  The patient tolerated the procedure well with no immediate complications.    See imaging dictation for procedural details and findings.    Provider name: Sebastien Pereira MD  Assistant(s):None

## 2020-12-28 NOTE — PROGRESS NOTES
Care Suites Admission Nursing Note    Patient Information  Name: Jim Richard  Age: 65 year old  Reason for admission: para  Care Suites arrival time: 1115    Visitor Information  Name: none  Informed of visitor restrictions: N/A  1 visitor allowed per patient   Visitor must screen negative for COVID symptoms   Visitor must wear a mask  Waiting rooms closed to visitors    Patient Admission/Assessment   Pre-procedure assessment complete: Yes  If abnormal assessment/labs, provider notified: N/A  NPO: N/A  Medications held per instructions/orders: Yes  Consent: obtained per md  If applicable, pregnancy test status: declined  Patient oriented to room: Yes  Education/questions answered: Yes reviewed avs and copy given  Plan/other: per orders    Discharge Planning  Discharge name/phone number: self with cab  Overnight post sedation caregiver: room mate  Discharge location: home    Merna Estes RN

## 2020-12-28 NOTE — PROGRESS NOTES
Care Suites Post Procedure Note    Patient Information  Name: Jim Richard  Age: 65 year old    Post Procedure  Time patient returned to Care Suites: 1322  Concerns/abnormal assessment: VSS. No pain. Site CDi with bandaid. Given meal and juice.  If abnormal assessment, provider notified: N/A  Plan/Other: per orders    Merna Estes RN     Care Suites Discharge Nursing Note    Patient Information  Name: Jim Richard  Age: 65 year old    Discharge Education:  Discharge instructions reviewed: Yes  Additional education/resources provided: na  Patient/patient representative verbalizes understanding: Yes  Patient discharging on new medications: No  Medication education completed: N/A    Discharge Plans:   Discharge location: home  Discharge ride contacted: Yes he is contacting cab  Approximate discharge time: 1340    Discharge Criteria:  Discharge criteria met and vital signs stable: Yes. No pain. Ate. Site CDi with bandaid.    Patient Belongs:  Patient belongings returned to patient: Yes    Merna Estes RN

## 2020-12-28 NOTE — PROGRESS NOTES
PATIENT/VISITOR WELLNESS SCREENING    Step 1 Patient Screening    1. In the last month, have you been in contact with someone who was confirmed or suspected to have Coronavirus/COVID-19? No    2. Do you have the following symptoms?  Fever/Chills? No   Cough? No   Shortness of breath? Yes: because of fluid build up   New loss of taste or smell? No  Sore throat? No  Muscle or body aches? Yes: off and on  Headaches? No  Fatigue? No  Vomiting or diarrhea? No    Step 2 Visitor Screening    1. Name of Visitor (1 visitor per patient): none      Step 3 Refer to logic grid below for actions    NO SYMPTOM(S)    ACTIONS:  1. Standard rooming process  2. Provider to assess per normal protocol  3. Implement precautions as needed and per guidelines     POSITIVE SYMPTOM(S)  If positive for ANY of the following symptoms: fever, cough, shortness of breath, rash    ACTION:  1. Continue to have the patient wear a mask   2. Room patient as soon as possible  3. Don appropriate PPE when entering room  4. Provider evaluation

## 2020-12-28 NOTE — DISCHARGE INSTRUCTIONS

## 2020-12-31 DIAGNOSIS — Z11.59 ENCOUNTER FOR SCREENING FOR OTHER VIRAL DISEASES: ICD-10-CM

## 2021-01-04 ENCOUNTER — TELEPHONE (OUTPATIENT)
Dept: MEDSURG UNIT | Facility: CLINIC | Age: 66
End: 2021-01-04

## 2021-01-04 RX ORDER — LIDOCAINE 40 MG/G
CREAM TOPICAL
Status: CANCELLED | OUTPATIENT
Start: 2021-01-04

## 2021-01-04 RX ORDER — DEXTROSE MONOHYDRATE 25 G/50ML
25-50 INJECTION, SOLUTION INTRAVENOUS
Status: CANCELLED | OUTPATIENT
Start: 2021-01-04

## 2021-01-04 RX ORDER — NICOTINE POLACRILEX 4 MG
15-30 LOZENGE BUCCAL
Status: CANCELLED | OUTPATIENT
Start: 2021-01-04

## 2021-01-04 RX ORDER — ALBUMIN (HUMAN) 12.5 G/50ML
12.5 SOLUTION INTRAVENOUS ONCE
Status: CANCELLED | OUTPATIENT
Start: 2021-01-04 | End: 2021-01-04

## 2021-01-04 NOTE — TELEPHONE ENCOUNTER
Pre-Procedure Negative COVID Test Results on 12/24/21    Results Reviewed  The patient has a negative COVID test result within the required timeframe for the scheduled procedure.     No COVID pre-call needed.     Brad Mccullough RN

## 2021-01-05 ENCOUNTER — HOSPITAL ENCOUNTER (OUTPATIENT)
Dept: ULTRASOUND IMAGING | Facility: CLINIC | Age: 66
End: 2021-01-05
Attending: FAMILY MEDICINE
Payer: MEDICARE

## 2021-01-05 ENCOUNTER — HOSPITAL ENCOUNTER (OUTPATIENT)
Facility: CLINIC | Age: 66
Discharge: HOME | End: 2021-01-05
Admitting: PHYSICIAN ASSISTANT
Payer: MEDICARE

## 2021-01-05 VITALS
HEIGHT: 67 IN | RESPIRATION RATE: 16 BRPM | SYSTOLIC BLOOD PRESSURE: 171 MMHG | DIASTOLIC BLOOD PRESSURE: 87 MMHG | TEMPERATURE: 97.9 F | OXYGEN SATURATION: 98 % | BODY MASS INDEX: 29.82 KG/M2 | HEART RATE: 77 BPM | WEIGHT: 190 LBS

## 2021-01-05 DIAGNOSIS — K70.31 ASCITES DUE TO ALCOHOLIC CIRRHOSIS (H): ICD-10-CM

## 2021-01-05 PROCEDURE — 49083 ABD PARACENTESIS W/IMAGING: CPT

## 2021-01-05 PROCEDURE — 999N000154 HC STATISTIC RADIOLOGY XRAY, US, CT, MAR, NM

## 2021-01-05 RX ORDER — ALBUMIN (HUMAN) 12.5 G/50ML
12.5 SOLUTION INTRAVENOUS ONCE
Status: DISCONTINUED | OUTPATIENT
Start: 2021-01-05 | End: 2021-01-05 | Stop reason: HOSPADM

## 2021-01-05 RX ORDER — LIDOCAINE 40 MG/G
CREAM TOPICAL
Status: DISCONTINUED | OUTPATIENT
Start: 2021-01-05 | End: 2021-01-05 | Stop reason: HOSPADM

## 2021-01-05 RX ORDER — DEXTROSE MONOHYDRATE 25 G/50ML
25-50 INJECTION, SOLUTION INTRAVENOUS
Status: DISCONTINUED | OUTPATIENT
Start: 2021-01-05 | End: 2021-01-05 | Stop reason: HOSPADM

## 2021-01-05 RX ORDER — NICOTINE POLACRILEX 4 MG
15-30 LOZENGE BUCCAL
Status: DISCONTINUED | OUTPATIENT
Start: 2021-01-05 | End: 2021-01-05 | Stop reason: HOSPADM

## 2021-01-05 RX ORDER — DEXTROSE MONOHYDRATE 25 G/50ML
25-50 INJECTION, SOLUTION INTRAVENOUS
Status: DISCONTINUED | OUTPATIENT
Start: 2021-01-05 | End: 2021-01-05

## 2021-01-05 RX ORDER — LIDOCAINE HYDROCHLORIDE 10 MG/ML
10 INJECTION, SOLUTION EPIDURAL; INFILTRATION; INTRACAUDAL; PERINEURAL ONCE
Status: COMPLETED | OUTPATIENT
Start: 2021-01-05 | End: 2021-01-05

## 2021-01-05 RX ORDER — NICOTINE POLACRILEX 4 MG
15-30 LOZENGE BUCCAL
Status: DISCONTINUED | OUTPATIENT
Start: 2021-01-05 | End: 2021-01-05

## 2021-01-05 RX ADMIN — LIDOCAINE HYDROCHLORIDE 10 ML: 10 INJECTION, SOLUTION EPIDURAL; INFILTRATION; INTRACAUDAL; PERINEURAL at 12:25

## 2021-01-05 ASSESSMENT — MIFFLIN-ST. JEOR: SCORE: 1600.46

## 2021-01-05 NOTE — DISCHARGE INSTRUCTIONS

## 2021-01-05 NOTE — PROGRESS NOTES
Care Suites Procedure Nursing Note    Patient Information  Name: Jim Richard  Age: 65 year old    Procedure  Procedure: US guided paracentesis  Procedure start time: 1220  Procedure complete time: 1250  Concerns/abnormal assessment: Drained 4850 yellow fluid. VSS. States he feels better. Tolerated procedure well.  If abnormal assessment, provider notified: N/A  Plan/Other: Return to care suites.    Sabra Brooks RN

## 2021-01-05 NOTE — PROCEDURES
[facility]    Procedure: Paracentesis    Date/Time: 1/5/2021 12:29 PM  Performed by: Gloria Singletary PA-C  Authorized by: Gloria Singletary PA-C     UNIVERSAL PROTOCOL   Site Marked: Yes  Prior Images Obtained and Reviewed:  Yes  Required items: Required blood products, implants, devices and special equipment available    Patient identity confirmed:  Verbally with patient  NA - No sedation, light sedation, or local anesthesia  Confirmation Checklist:  Patient's identity using two indicators, relevant allergies, procedure was appropriate and matched the consent or emergent situation and correct equipment/implants were available  Time out: Immediately prior to the procedure a time out was called    Universal Protocol: the Joint Commission Universal Protocol was followed    Preparation: Patient was prepped and draped in usual sterile fashion           ANESTHESIA    Anesthesia: Local infiltration  Local Anesthetic:  Lidocaine 1% without epinephrine  Anesthetic Total (mL):  10      SEDATION    Patient Sedated: No    See dictated procedure note for full details.  PROCEDURE   Patient Tolerance:  Patient tolerated the procedure well with no immediate complications  Describe Procedure: Straw colored fluid aspirated  Length of time physician/provider present for 1:1 monitoring during sedation: 0

## 2021-01-05 NOTE — PROGRESS NOTES
PATIENT/VISITOR WELLNESS SCREENING    Step 1 Patient Screening    1. In the last month, have you been in contact with someone who was confirmed or suspected to have Coronavirus/COVID-19? No    2. Do you have the following symptoms?  Fever/Chills? No   Cough? Yes: from smoking   Shortness of breath? Yes: baseline   New loss of taste or smell? No  Sore throat? No  Muscle or body aches? Yes: legs  Headaches? No  Fatigue? Yes: didn't sleep last night  Vomiting or diarrhea? No      Step 3 Refer to logic grid below for actions    NO SYMPTOM(S)    ACTIONS:  1. Standard rooming process  2. Provider to assess per normal protocol  3. Implement precautions as needed and per guidelines     POSITIVE SYMPTOM(S)  If positive for ANY of the following symptoms: fever, cough, shortness of breath, rash    ACTION:  1. Continue to have the patient wear a mask   2. Room patient as soon as possible  3. Don appropriate PPE when entering room  4. Provider evaluation

## 2021-01-05 NOTE — PROGRESS NOTES
Care Suites Discharge Nursing Note    Patient Information  Name: Jim Richard  Age: 65 year old    Discharge Education:  Discharge instructions reviewed: Yes  Additional education/resources provided: na  Patient/patient representative verbalizes understanding: Yes  Patient discharging on new medications: No  Medication education completed: N/A    Discharge Plans:   Discharge location: home  Discharge ride contacted: N/A  Approximate discharge time: 1330    Discharge Criteria:  Discharge criteria met and vital signs stable: Yes    Patient Belongs:  Patient belongings returned to patient: Yes    Sabra Brooks RN

## 2021-01-05 NOTE — PROGRESS NOTES
Care Suites Admission Nursing Note    Patient Information  Name: Jim Richard  Age: 65 year old  Reason for admission: Paracentesis  Care Suites arrival time: 1130      Patient Admission/Assessment   Pre-procedure assessment complete: Yes  If abnormal assessment/labs, provider notified: N/A  NPO: N/A  Medications held per instructions/orders: N/A  Consent: obtained (will obtain in US)  If applicable, pregnancy test status: deferred  Patient oriented to room: Yes  Education/questions answered: Yes  Plan/other: Proceed with 1245 Paracentesis    Discharge Planning  Discharge name/phone number: self  Overnight post sedation caregiver: n/a  Discharge location: home    Adriana Ignacio RN

## 2021-01-05 NOTE — PROGRESS NOTES
Care Suites Post Procedure Note    Patient Information  Name: Jim Richard  Age: 65 year old    Post Procedure  Time patient returned to Care Suites: 1255  Concerns/abnormal assessment: Site D/I VSS. Denies pain.  If abnormal assessment, provider notified: N/A  Plan/Other: Eating lunch. Will discharge if stable.    Sabra Brooks RN

## 2021-01-11 ENCOUNTER — HOSPITAL ENCOUNTER (OUTPATIENT)
Facility: CLINIC | Age: 66
End: 2021-01-11
Payer: MEDICARE

## 2021-01-12 DIAGNOSIS — Z11.59 ENCOUNTER FOR SCREENING FOR OTHER VIRAL DISEASES: ICD-10-CM

## 2021-01-12 LAB
LABORATORY COMMENT REPORT: NORMAL
SARS-COV-2 RNA RESP QL NAA+PROBE: NEGATIVE
SARS-COV-2 RNA RESP QL NAA+PROBE: NORMAL
SPECIMEN SOURCE: NORMAL
SPECIMEN SOURCE: NORMAL

## 2021-01-12 PROCEDURE — U0003 INFECTIOUS AGENT DETECTION BY NUCLEIC ACID (DNA OR RNA); SEVERE ACUTE RESPIRATORY SYNDROME CORONAVIRUS 2 (SARS-COV-2) (CORONAVIRUS DISEASE [COVID-19]), AMPLIFIED PROBE TECHNIQUE, MAKING USE OF HIGH THROUGHPUT TECHNOLOGIES AS DESCRIBED BY CMS-2020-01-R: HCPCS | Performed by: FAMILY MEDICINE

## 2021-01-12 PROCEDURE — U0005 INFEC AGEN DETEC AMPLI PROBE: HCPCS | Performed by: FAMILY MEDICINE

## 2021-01-13 ENCOUNTER — TELEPHONE (OUTPATIENT)
Dept: MEDSURG UNIT | Facility: CLINIC | Age: 66
End: 2021-01-13

## 2021-01-13 RX ORDER — DEXTROSE MONOHYDRATE 25 G/50ML
25-50 INJECTION, SOLUTION INTRAVENOUS
Status: CANCELLED | OUTPATIENT
Start: 2021-01-13

## 2021-01-13 RX ORDER — LIDOCAINE 40 MG/G
CREAM TOPICAL
Status: CANCELLED | OUTPATIENT
Start: 2021-01-13

## 2021-01-13 RX ORDER — ALBUMIN (HUMAN) 12.5 G/50ML
12.5 SOLUTION INTRAVENOUS ONCE
Status: CANCELLED | OUTPATIENT
Start: 2021-01-13 | End: 2021-01-13

## 2021-01-13 RX ORDER — NICOTINE POLACRILEX 4 MG
15-30 LOZENGE BUCCAL
Status: CANCELLED | OUTPATIENT
Start: 2021-01-13

## 2021-01-13 NOTE — TELEPHONE ENCOUNTER
Pre-Procedure Negative COVID Test Results    Results Reviewed  The patient has a negative COVID test result within the required timeframe for the scheduled procedure.     No COVID pre-call needed.     Marianna Diana RN

## 2021-01-14 DIAGNOSIS — Z11.59 ENCOUNTER FOR SCREENING FOR OTHER VIRAL DISEASES: ICD-10-CM

## 2021-01-15 ENCOUNTER — APPOINTMENT (OUTPATIENT)
Dept: ULTRASOUND IMAGING | Facility: CLINIC | Age: 66
End: 2021-01-15
Attending: EMERGENCY MEDICINE
Payer: MEDICARE

## 2021-01-15 ENCOUNTER — HOSPITAL ENCOUNTER (EMERGENCY)
Facility: CLINIC | Age: 66
Discharge: HOME | End: 2021-01-15
Attending: EMERGENCY MEDICINE | Admitting: EMERGENCY MEDICINE
Payer: MEDICARE

## 2021-01-15 VITALS
WEIGHT: 195.7 LBS | DIASTOLIC BLOOD PRESSURE: 80 MMHG | RESPIRATION RATE: 16 BRPM | OXYGEN SATURATION: 95 % | SYSTOLIC BLOOD PRESSURE: 145 MMHG | BODY MASS INDEX: 30.65 KG/M2 | TEMPERATURE: 98.3 F | HEART RATE: 82 BPM

## 2021-01-15 DIAGNOSIS — K70.31 ALCOHOLIC CIRRHOSIS OF LIVER WITH ASCITES (H): ICD-10-CM

## 2021-01-15 DIAGNOSIS — K42.9 UMBILICAL HERNIA WITHOUT OBSTRUCTION AND WITHOUT GANGRENE: ICD-10-CM

## 2021-01-15 PROCEDURE — 99285 EMERGENCY DEPT VISIT HI MDM: CPT | Mod: 25

## 2021-01-15 PROCEDURE — 999N000154 HC STATISTIC RADIOLOGY XRAY, US, CT, MAR, NM

## 2021-01-15 PROCEDURE — 250N000013 HC RX MED GY IP 250 OP 250 PS 637: Performed by: EMERGENCY MEDICINE

## 2021-01-15 PROCEDURE — 49083 ABD PARACENTESIS W/IMAGING: CPT

## 2021-01-15 RX ORDER — ALBUMIN (HUMAN) 12.5 G/50ML
12.5 SOLUTION INTRAVENOUS ONCE
Status: CANCELLED | OUTPATIENT
Start: 2021-01-15 | End: 2021-01-15

## 2021-01-15 RX ORDER — DEXTROSE MONOHYDRATE 25 G/50ML
25-50 INJECTION, SOLUTION INTRAVENOUS
Status: CANCELLED | OUTPATIENT
Start: 2021-01-15

## 2021-01-15 RX ORDER — LIDOCAINE HYDROCHLORIDE 10 MG/ML
10 INJECTION, SOLUTION EPIDURAL; INFILTRATION; INTRACAUDAL; PERINEURAL ONCE
Status: COMPLETED | OUTPATIENT
Start: 2021-01-15 | End: 2021-01-15

## 2021-01-15 RX ORDER — LIDOCAINE 40 MG/G
CREAM TOPICAL
Status: DISCONTINUED | OUTPATIENT
Start: 2021-01-15 | End: 2021-01-15 | Stop reason: HOSPADM

## 2021-01-15 RX ORDER — DIAZEPAM 5 MG
10 TABLET ORAL ONCE
Status: COMPLETED | OUTPATIENT
Start: 2021-01-15 | End: 2021-01-15

## 2021-01-15 RX ORDER — NICOTINE POLACRILEX 4 MG
15-30 LOZENGE BUCCAL
Status: CANCELLED | OUTPATIENT
Start: 2021-01-15

## 2021-01-15 RX ORDER — NICOTINE POLACRILEX 4 MG
15-30 LOZENGE BUCCAL
Status: DISCONTINUED | OUTPATIENT
Start: 2021-01-15 | End: 2021-01-15 | Stop reason: HOSPADM

## 2021-01-15 RX ORDER — DEXTROSE MONOHYDRATE 25 G/50ML
25-50 INJECTION, SOLUTION INTRAVENOUS
Status: DISCONTINUED | OUTPATIENT
Start: 2021-01-15 | End: 2021-01-15 | Stop reason: HOSPADM

## 2021-01-15 RX ORDER — ALBUMIN (HUMAN) 12.5 G/50ML
12.5 SOLUTION INTRAVENOUS ONCE
Status: DISCONTINUED | OUTPATIENT
Start: 2021-01-15 | End: 2021-01-15 | Stop reason: HOSPADM

## 2021-01-15 RX ORDER — LIDOCAINE 40 MG/G
CREAM TOPICAL
Status: CANCELLED | OUTPATIENT
Start: 2021-01-15

## 2021-01-15 RX ORDER — DIAZEPAM 5 MG
5 TABLET ORAL ONCE
Status: DISCONTINUED | OUTPATIENT
Start: 2021-01-15 | End: 2021-01-15

## 2021-01-15 RX ADMIN — DIAZEPAM 10 MG: 5 TABLET ORAL at 07:53

## 2021-01-15 RX ADMIN — LIDOCAINE HYDROCHLORIDE 10 ML: 10 INJECTION, SOLUTION EPIDURAL; INFILTRATION; INTRACAUDAL; PERINEURAL at 08:37

## 2021-01-15 ASSESSMENT — ENCOUNTER SYMPTOMS
FEVER: 0
ABDOMINAL PAIN: 0
NAUSEA: 0
VOMITING: 0
ABDOMINAL DISTENTION: 1

## 2021-01-15 NOTE — DISCHARGE INSTRUCTIONS
Please slowly decrease your alcohol intake and then stop.    Please follow-up with your primary care doctor as needed.    Please return to the emergency department as needed for new or worsening symptoms including severe and uncontrollable pain, fever greater than 100.4  F, vomiting and unable to keep anything down, fainting, seizures, any other concerning symptoms.

## 2021-01-15 NOTE — PROCEDURES
[facility]    Procedure: US Paracentesis    Date/Time: 1/15/2021 9:42 AM  Performed by: Troy High MD  Authorized by: Troy High MD     UNIVERSAL PROTOCOL   Site Marked: NA  Prior Images Obtained and Reviewed:  Yes  Required items: Required blood products, implants, devices and special equipment available    Patient identity confirmed:  Verbally with patient, arm band, provided demographic data and hospital-assigned identification number  Patient was reevaluated immediately before administering moderate or deep sedation or anesthesia  Confirmation Checklist:  Patient's identity using two indicators, relevant allergies, procedure was appropriate and matched the consent or emergent situation and correct equipment/implants were available  Time out: Immediately prior to the procedure a time out was called    Universal Protocol: the Joint Commission Universal Protocol was followed    Preparation: Patient was prepped and draped in usual sterile fashion    ESBL (mL):  2         ANESTHESIA    Anesthesia: Local infiltration  Local Anesthetic:  Lidocaine 1% without epinephrine      SEDATION    Patient Sedated: No    See dictated procedure note for full details.  Findings: US guided right abdominal paracentesis with removal of clear yellow fluid.  No complications.    Specimens: none    Complications: None    Condition: Stable    PROCEDURE   Patient Tolerance:  Patient tolerated the procedure well with no immediate complications    Length of time physician/provider present for 1:1 monitoring during sedation: 0

## 2021-01-15 NOTE — ED PROVIDER NOTES
History   Chief Complaint  Periumbilical mass    HPI   Jim Richard is a 65 year old male with a history of severe alcohol dependence with withdrawal, alcoholic hepatitis and cirrhosis of the liver, hypertension, and hyperlipidemia, who presents for evaluation of a periumbilical abdominal mass. The patient was originally scheduled to have a paracentesis this morning for severe abdominal distention; however, he canceled it over concern for this new lump that he noticed in his periumbilical region. He denies any abdominal pain, nausea, or vomiting. He endorses urinary and bowel movement issues however notes this is present for him at baseline. The patient also reports some leg swelling. Denies any chest pain or fevers. He last drank yesterday. Denies any history of alcohol withdrawal related seizures.     Review of Systems   Constitutional: Negative for fever.   Cardiovascular: Positive for leg swelling. Negative for chest pain.   Gastrointestinal: Positive for abdominal distention. Negative for abdominal pain, nausea and vomiting.   All other systems reviewed and are negative.    Allergies  Amlodipine  Lisinopril    Medications  Breo ellipta  Folvite  Lasix  Atarax  Mag-Ox  Proamatine  Remeron  Corgard  Nicoderm  Protonix  Seroquel  Flomax  Trintellix    Past Medical History  Severe alcohol dependence with withdrawal  Anxiety              Coronary artery disease           Depression        Esophageal varices with bleeding        Heart attack  Hepatomegaly   Hyperlipemia     Hypertension     JANIS on CPAP               Peripheral vascular disease  Subdural hematoma     Spinal stenosis              Substance abuse  Peripheral artery disease  Suicidal ideation  Rib fractures  Alcoholic hepatitis  Alcoholic cirrhosis of the liver  COPD  Anemia  C diff  Alcoholic ketoacidosis    Past Surgical History  Appendectomy  Bypass Graft Femoropopliteal  Colonoscopy x3  Endarterectomy femoral   EGD Combined x6  Hernia Repair,  abdominal   Laminectomy, fusion lumbar three level, combined  Tonsillectomy and adenoidectomy    Family History  CAD, early onset  Substance abuse  Lung Cancer    Social History  Presents to the ED alone.   Tobacco use: One pack of cigarettes a day.   ETOH use: severe with withdrawal  Negative for drug use.    Physical Exam     Patient Vitals for the past 24 hrs:   BP Temp Temp src Pulse Resp SpO2 Weight   01/15/21 0908 -- -- -- -- -- 95 % --   01/15/21 0907 (!) 145/80 -- -- 82 -- -- --   01/15/21 0852 (!) 149/72 -- -- 82 -- -- --   01/15/21 0846 (!) 144/65 -- -- 78 16 -- --   01/15/21 0826 (!) 156/77 -- -- 75 20 -- --   01/15/21 0751 -- -- -- -- -- 98 % --   01/15/21 0734 (!) 177/84 98.3  F (36.8  C) Oral 92 16 98 % 88.8 kg (195 lb 11.2 oz)     Physical Exam  Constitutional: Well developed, nontox appearance  Head: Atraumatic.   Neck:  no stridor  Eyes: no scleral icterus  Cardiovascular: RRR, 2+ bilat radial pulses  Pulmonary/Chest: nml resp effort, Clear BS bilat  Abdominal: Distended, umbilical hernia noted, easily reducible and soft, positive fluid wave, NT, no rebound or guarding   Ext: Warm, well perfused, BLE edema  Neurological: A&O, symmetric facies, moves ext x4  Skin: Skin is warm and dry.   Psychiatric: Behavior is normal. Thought content normal.   Nursing note and vitals reviewed.    Emergency Department Course   Imaging:  US Paracentesis:  3900 mL of yellow fluid removed from RLQ. As per radiology.     Emergency Department Course:  Reviewed:  I reviewed nursing notes, vitals and past medical history    Assessments:  07 I physically examined the patient as documented above.     I rechecked the patient.     Interventions:  0753 Valium 10 mg PO  0837 Lidocaine 1% injection 10 mL    Disposition:  The patient was discharged to home.     Impression & Plan   Medical Decision Makin year old male presenting w/ umbilical hernia, ascites     Patient's vital signs within normal limits.  He has umbilical  hernia that is easily reducible and minimally tender on exam.  The patient is having no abdominal pain at this time.  Reports that he canceled his appointment for paracentesis this morning because he is concerned of his hernia.  I explained that his increased ascites is likely making his hernia more prominent and that he should proceed with paracentesis as previously scheduled.  Ultrasound-guided paracentesis ordered from the emergency department given the patient canceled his appointment.  I do not feel any further blood work or emergent evaluation is indicated at this time.  Paracentesis performed in radiology.  At this time I feel the pt is safe for discharge.  Recommendations given regarding follow up with PCP and return to the emergency department as needed for new or worsening symptoms.  Pt counseled on all results, disposition and diagnosis.  They are understanding and agreeable to plan. Patient discharged in   stable condition.         Diagnosis:    ICD-10-CM    1. Alcoholic cirrhosis of liver with ascites (H)  K70.31    2. Umbilical hernia without obstruction and without gangrene  K42.9      Scribe Disclosure:  I, Jude Kumar, am serving as a scribe at 7:31 AM on 1/15/2021 to document services personally performed by Mauricio Mac MD based on my observations and the provider's statements to me.      Mauricio Mac MD  01/16/21 2908

## 2021-01-15 NOTE — PROGRESS NOTES
Dr High in to see pt and perform paracentesis.  Consent obtained and time out completed prior to procedure. Pt tolerated procedure well. Right side of abdomen used for paracentesis and draining clear yellow fluid.

## 2021-01-15 NOTE — ED AVS SNAPSHOT
[facility] Emergency Dept  [address] [address]  Phone: [phone]  Fax: [phone]                                    Jim Richard   MRN: [id number]    Department: [facility] Emergency [facility]   Date of Visit: 1/15/2021           After Visit Summary Signature Page    I have received my discharge instructions, and my questions have been answered. I have discussed any challenges I see with this plan with the nurse or doctor.    ..........................................................................................................................................  Patient/Patient Representative Signature      ..........................................................................................................................................  Patient Representative Print Name and Relationship to Patient    ..................................................               ................................................  Date                                   Time    ..........................................................................................................................................  Reviewed by Signature/Title    ...................................................              ..............................................  Date                                               Time          22EPIC Rev 08/18

## 2021-01-15 NOTE — ED NOTES
Pt states he drove to the hospital today.  Pt verbally agreed to accept a cab ride home form the ER due to his getting valium in the ED.

## 2021-01-15 NOTE — ED TRIAGE NOTES
Pt was scheduled for paracentesis this morning but pt concern about hernia - right lower abd pain - feels lump

## 2021-01-15 NOTE — PROGRESS NOTES
3900 ml fluid drained during paracentesis. Pt denies pain. bandaid applied to right side of abdomen per US  tech.

## 2021-01-15 NOTE — PROGRESS NOTES
Care Suites Procedure Nursing Note    Patient Information  Name: Jim Richard  Age: 65 year old    Procedure  Procedure: paracentesis  Procedure start time: 0825  Procedure complete time: pending  Concerns/abnormal assessment: N/A  If abnormal assessment, provider notified: N/A  Plan/Other: per ED plan of care--will transfer ED after procedure.     Sully Carpenter RN

## 2021-01-21 ENCOUNTER — TELEPHONE (OUTPATIENT)
Dept: MEDSURG UNIT | Facility: CLINIC | Age: 66
End: 2021-01-21

## 2021-01-21 NOTE — TELEPHONE ENCOUNTER
Pre-Procedure Negative COVID Test Results    Results Reviewed  The patient has a negative COVID test result within the required timeframe for the scheduled procedure.     No COVID pre-call needed.     Alis Ramos RN

## 2021-01-22 ENCOUNTER — HOSPITAL ENCOUNTER (INPATIENT)
Facility: CLINIC | Age: 66
LOS: 5 days | Discharge: HOME | DRG: 378 | End: 2021-01-27
Attending: EMERGENCY MEDICINE | Admitting: INTERNAL MEDICINE
Payer: MEDICARE

## 2021-01-22 ENCOUNTER — APPOINTMENT (OUTPATIENT)
Dept: ULTRASOUND IMAGING | Facility: CLINIC | Age: 66
DRG: 378 | End: 2021-01-22
Attending: INTERNAL MEDICINE
Payer: MEDICARE

## 2021-01-22 DIAGNOSIS — F32.A ANXIETY AND DEPRESSION: ICD-10-CM

## 2021-01-22 DIAGNOSIS — F41.9 ANXIETY AND DEPRESSION: ICD-10-CM

## 2021-01-22 DIAGNOSIS — K92.2 GASTROINTESTINAL HEMORRHAGE, UNSPECIFIED GASTROINTESTINAL HEMORRHAGE TYPE: ICD-10-CM

## 2021-01-22 DIAGNOSIS — R33.8 BENIGN PROSTATIC HYPERPLASIA WITH URINARY RETENTION: Primary | ICD-10-CM

## 2021-01-22 DIAGNOSIS — K70.31 ALCOHOLIC CIRRHOSIS OF LIVER WITH ASCITES (H): ICD-10-CM

## 2021-01-22 DIAGNOSIS — F10.920 ALCOHOLIC INTOXICATION WITHOUT COMPLICATION (H): ICD-10-CM

## 2021-01-22 DIAGNOSIS — N40.1 BENIGN PROSTATIC HYPERPLASIA WITH URINARY RETENTION: Primary | ICD-10-CM

## 2021-01-22 LAB
ABO + RH BLD: NORMAL
ABO + RH BLD: NORMAL
ALBUMIN SERPL-MCNC: 2.9 G/DL (ref 3.4–5)
ALP SERPL-CCNC: 279 U/L (ref 40–150)
ALT SERPL W P-5'-P-CCNC: 42 U/L (ref 0–70)
ANION GAP SERPL CALCULATED.3IONS-SCNC: 16 MMOL/L (ref 3–14)
APTT PPP: 32 SEC (ref 22–37)
AST SERPL W P-5'-P-CCNC: 105 U/L (ref 0–45)
BASOPHILS # BLD AUTO: 0.1 10E9/L (ref 0–0.2)
BASOPHILS NFR BLD AUTO: 1.2 %
BILIRUB SERPL-MCNC: 1.6 MG/DL (ref 0.2–1.3)
BLD GP AB SCN SERPL QL: NORMAL
BLOOD BANK CMNT PATIENT-IMP: NORMAL
BUN SERPL-MCNC: 25 MG/DL (ref 7–30)
CALCIUM SERPL-MCNC: 8.3 MG/DL (ref 8.5–10.1)
CHLORIDE SERPL-SCNC: 104 MMOL/L (ref 94–109)
CO2 SERPL-SCNC: 17 MMOL/L (ref 20–32)
CREAT SERPL-MCNC: 1.59 MG/DL (ref 0.66–1.25)
DIFFERENTIAL METHOD BLD: ABNORMAL
EOSINOPHIL # BLD AUTO: 0 10E9/L (ref 0–0.7)
EOSINOPHIL NFR BLD AUTO: 0.6 %
ERYTHROCYTE [DISTWIDTH] IN BLOOD BY AUTOMATED COUNT: 15.5 % (ref 10–15)
GFR SERPL CREATININE-BSD FRML MDRD: 44 ML/MIN/{1.73_M2}
GLUCOSE BLDC GLUCOMTR-MCNC: 65 MG/DL (ref 70–99)
GLUCOSE SERPL-MCNC: 66 MG/DL (ref 70–99)
HCT VFR BLD AUTO: 26.7 % (ref 40–53)
HEMOCCULT STL QL: POSITIVE
HGB BLD-MCNC: 7.5 G/DL (ref 13.3–17.7)
HGB BLD-MCNC: 8.1 G/DL (ref 13.3–17.7)
HGB BLD-MCNC: 8.7 G/DL (ref 13.3–17.7)
IMM GRANULOCYTES # BLD: 0 10E9/L (ref 0–0.4)
IMM GRANULOCYTES NFR BLD: 0.2 %
INR PPP: 1.08 (ref 0.86–1.14)
INR PPP: 1.12 (ref 0.86–1.14)
INR PPP: 1.15 (ref 0.86–1.14)
INTERPRETATION ECG - MUSE: NORMAL
LABORATORY COMMENT REPORT: NORMAL
LYMPHOCYTES # BLD AUTO: 0.4 10E9/L (ref 0.8–5.3)
LYMPHOCYTES NFR BLD AUTO: 7.8 %
MAGNESIUM SERPL-MCNC: 1.6 MG/DL (ref 1.6–2.3)
MCH RBC QN AUTO: 31.2 PG (ref 26.5–33)
MCHC RBC AUTO-ENTMCNC: 32.6 G/DL (ref 31.5–36.5)
MCV RBC AUTO: 96 FL (ref 78–100)
MONOCYTES # BLD AUTO: 0.6 10E9/L (ref 0–1.3)
MONOCYTES NFR BLD AUTO: 11.5 %
NEUTROPHILS # BLD AUTO: 3.8 10E9/L (ref 1.6–8.3)
NEUTROPHILS NFR BLD AUTO: 78.7 %
NRBC # BLD AUTO: 0 10*3/UL
NRBC BLD AUTO-RTO: 0 /100
PHOSPHATE SERPL-MCNC: 3.2 MG/DL (ref 2.5–4.5)
PLATELET # BLD AUTO: 131 10E9/L (ref 150–450)
PLATELET # BLD AUTO: 99 10E9/L (ref 150–450)
POTASSIUM SERPL-SCNC: 4.1 MMOL/L (ref 3.4–5.3)
PROT SERPL-MCNC: 6.8 G/DL (ref 6.8–8.8)
RBC # BLD AUTO: 2.79 10E12/L (ref 4.4–5.9)
SARS-COV-2 RNA RESP QL NAA+PROBE: NEGATIVE
SODIUM SERPL-SCNC: 137 MMOL/L (ref 133–144)
SPECIMEN EXP DATE BLD: NORMAL
SPECIMEN SOURCE: NORMAL
WBC # BLD AUTO: 4.9 10E9/L (ref 4–11)

## 2021-01-22 PROCEDURE — 80053 COMPREHEN METABOLIC PANEL: CPT | Performed by: EMERGENCY MEDICINE

## 2021-01-22 PROCEDURE — 250N000013 HC RX MED GY IP 250 OP 250 PS 637: Performed by: INTERNAL MEDICINE

## 2021-01-22 PROCEDURE — 5A09357 ASSISTANCE WITH RESPIRATORY VENTILATION, LESS THAN 24 CONSECUTIVE HOURS, CONTINUOUS POSITIVE AIRWAY PRESSURE: ICD-10-PCS | Performed by: INTERNAL MEDICINE

## 2021-01-22 PROCEDURE — 85610 PROTHROMBIN TIME: CPT | Performed by: INTERNAL MEDICINE

## 2021-01-22 PROCEDURE — 258N000003 HC RX IP 258 OP 636: Performed by: INTERNAL MEDICINE

## 2021-01-22 PROCEDURE — 0W9G3ZZ DRAINAGE OF PERITONEAL CAVITY, PERCUTANEOUS APPROACH: ICD-10-PCS | Performed by: RADIOLOGY

## 2021-01-22 PROCEDURE — 120N000001 HC R&B MED SURG/OB

## 2021-01-22 PROCEDURE — 93005 ELECTROCARDIOGRAM TRACING: CPT

## 2021-01-22 PROCEDURE — 85610 PROTHROMBIN TIME: CPT | Performed by: EMERGENCY MEDICINE

## 2021-01-22 PROCEDURE — 250N000011 HC RX IP 250 OP 636: Performed by: INTERNAL MEDICINE

## 2021-01-22 PROCEDURE — 96375 TX/PRO/DX INJ NEW DRUG ADDON: CPT

## 2021-01-22 PROCEDURE — 85730 THROMBOPLASTIN TIME PARTIAL: CPT | Performed by: EMERGENCY MEDICINE

## 2021-01-22 PROCEDURE — 86850 RBC ANTIBODY SCREEN: CPT | Performed by: EMERGENCY MEDICINE

## 2021-01-22 PROCEDURE — 96367 TX/PROPH/DG ADDL SEQ IV INF: CPT

## 2021-01-22 PROCEDURE — 250N000009 HC RX 250: Performed by: RADIOLOGY

## 2021-01-22 PROCEDURE — 99223 1ST HOSP IP/OBS HIGH 75: CPT | Mod: AI | Performed by: INTERNAL MEDICINE

## 2021-01-22 PROCEDURE — 87635 SARS-COV-2 COVID-19 AMP PRB: CPT | Performed by: EMERGENCY MEDICINE

## 2021-01-22 PROCEDURE — 36415 COLL VENOUS BLD VENIPUNCTURE: CPT | Performed by: INTERNAL MEDICINE

## 2021-01-22 PROCEDURE — 85018 HEMOGLOBIN: CPT | Performed by: EMERGENCY MEDICINE

## 2021-01-22 PROCEDURE — 99285 EMERGENCY DEPT VISIT HI MDM: CPT | Mod: 25

## 2021-01-22 PROCEDURE — HZ2ZZZZ DETOXIFICATION SERVICES FOR SUBSTANCE ABUSE TREATMENT: ICD-10-PCS | Performed by: INTERNAL MEDICINE

## 2021-01-22 PROCEDURE — 83735 ASSAY OF MAGNESIUM: CPT | Performed by: INTERNAL MEDICINE

## 2021-01-22 PROCEDURE — 258N000003 HC RX IP 258 OP 636: Performed by: EMERGENCY MEDICINE

## 2021-01-22 PROCEDURE — C9113 INJ PANTOPRAZOLE SODIUM, VIA: HCPCS | Performed by: EMERGENCY MEDICINE

## 2021-01-22 PROCEDURE — C9113 INJ PANTOPRAZOLE SODIUM, VIA: HCPCS | Performed by: INTERNAL MEDICINE

## 2021-01-22 PROCEDURE — 49083 ABD PARACENTESIS W/IMAGING: CPT

## 2021-01-22 PROCEDURE — 250N000011 HC RX IP 250 OP 636: Performed by: EMERGENCY MEDICINE

## 2021-01-22 PROCEDURE — 82272 OCCULT BLD FECES 1-3 TESTS: CPT | Performed by: EMERGENCY MEDICINE

## 2021-01-22 PROCEDURE — 85049 AUTOMATED PLATELET COUNT: CPT | Performed by: INTERNAL MEDICINE

## 2021-01-22 PROCEDURE — 96365 THER/PROPH/DIAG IV INF INIT: CPT

## 2021-01-22 PROCEDURE — C9803 HOPD COVID-19 SPEC COLLECT: HCPCS

## 2021-01-22 PROCEDURE — 96368 THER/DIAG CONCURRENT INF: CPT

## 2021-01-22 PROCEDURE — 84100 ASSAY OF PHOSPHORUS: CPT | Performed by: INTERNAL MEDICINE

## 2021-01-22 PROCEDURE — 999N001017 HC STATISTIC GLUCOSE BY METER IP

## 2021-01-22 PROCEDURE — 86900 BLOOD TYPING SEROLOGIC ABO: CPT | Performed by: EMERGENCY MEDICINE

## 2021-01-22 PROCEDURE — 85025 COMPLETE CBC W/AUTO DIFF WBC: CPT | Performed by: EMERGENCY MEDICINE

## 2021-01-22 PROCEDURE — 85018 HEMOGLOBIN: CPT | Performed by: INTERNAL MEDICINE

## 2021-01-22 PROCEDURE — 86901 BLOOD TYPING SEROLOGIC RH(D): CPT | Performed by: EMERGENCY MEDICINE

## 2021-01-22 PROCEDURE — 96376 TX/PRO/DX INJ SAME DRUG ADON: CPT

## 2021-01-22 RX ORDER — PROCHLORPERAZINE 25 MG
12.5 SUPPOSITORY, RECTAL RECTAL EVERY 12 HOURS PRN
Status: DISCONTINUED | OUTPATIENT
Start: 2021-01-22 | End: 2021-01-27 | Stop reason: HOSPADM

## 2021-01-22 RX ORDER — LIDOCAINE 40 MG/G
CREAM TOPICAL
Status: DISCONTINUED | OUTPATIENT
Start: 2021-01-22 | End: 2021-01-26

## 2021-01-22 RX ORDER — POLYETHYLENE GLYCOL 3350 17 G
2 POWDER IN PACKET (EA) ORAL
Status: DISCONTINUED | OUTPATIENT
Start: 2021-01-22 | End: 2021-01-27 | Stop reason: HOSPADM

## 2021-01-22 RX ORDER — QUETIAPINE FUMARATE 25 MG/1
50 TABLET, FILM COATED ORAL DAILY PRN
Status: DISCONTINUED | OUTPATIENT
Start: 2021-01-22 | End: 2021-01-23

## 2021-01-22 RX ORDER — HYDROXYZINE HYDROCHLORIDE 25 MG/1
50 TABLET, FILM COATED ORAL
Status: DISCONTINUED | OUTPATIENT
Start: 2021-01-22 | End: 2021-01-27 | Stop reason: HOSPADM

## 2021-01-22 RX ORDER — MIRTAZAPINE 30 MG/1
30 TABLET, FILM COATED ORAL AT BEDTIME
Status: ON HOLD | COMMUNITY
End: 2021-08-22

## 2021-01-22 RX ORDER — QUETIAPINE FUMARATE 200 MG/1
200 TABLET, FILM COATED ORAL AT BEDTIME
Status: ON HOLD | COMMUNITY
End: 2021-08-22

## 2021-01-22 RX ORDER — OCTREOTIDE ACETATE 50 UG/ML
50 INJECTION, SOLUTION INTRAVENOUS; SUBCUTANEOUS ONCE
Status: COMPLETED | OUTPATIENT
Start: 2021-01-22 | End: 2021-01-22

## 2021-01-22 RX ORDER — DEXTROSE MONOHYDRATE 25 G/50ML
25-50 INJECTION, SOLUTION INTRAVENOUS
Status: DISCONTINUED | OUTPATIENT
Start: 2021-01-22 | End: 2021-01-27 | Stop reason: HOSPADM

## 2021-01-22 RX ORDER — MULTIPLE VITAMINS W/ MINERALS TAB 9MG-400MCG
1 TAB ORAL DAILY
Status: DISCONTINUED | OUTPATIENT
Start: 2021-01-22 | End: 2021-01-22

## 2021-01-22 RX ORDER — MIRTAZAPINE 15 MG/1
30 TABLET, FILM COATED ORAL AT BEDTIME
Status: DISCONTINUED | OUTPATIENT
Start: 2021-01-22 | End: 2021-01-27 | Stop reason: HOSPADM

## 2021-01-22 RX ORDER — LANOLIN ALCOHOL/MO/W.PET/CERES
200 CREAM (GRAM) TOPICAL 3 TIMES DAILY
Status: DISCONTINUED | OUTPATIENT
Start: 2021-01-22 | End: 2021-01-23

## 2021-01-22 RX ORDER — TRAZODONE HYDROCHLORIDE 100 MG/1
100 TABLET ORAL AT BEDTIME
Status: DISCONTINUED | OUTPATIENT
Start: 2021-01-22 | End: 2021-01-27 | Stop reason: HOSPADM

## 2021-01-22 RX ORDER — LANOLIN ALCOHOL/MO/W.PET/CERES
100 CREAM (GRAM) TOPICAL 3 TIMES DAILY
Status: DISCONTINUED | OUTPATIENT
Start: 2021-01-24 | End: 2021-01-23

## 2021-01-22 RX ORDER — LIDOCAINE HYDROCHLORIDE 10 MG/ML
10 INJECTION, SOLUTION EPIDURAL; INFILTRATION; INTRACAUDAL; PERINEURAL ONCE
Status: COMPLETED | OUTPATIENT
Start: 2021-01-22 | End: 2021-01-22

## 2021-01-22 RX ORDER — LANOLIN ALCOHOL/MO/W.PET/CERES
100 CREAM (GRAM) TOPICAL DAILY
Status: DISCONTINUED | OUTPATIENT
Start: 2021-01-30 | End: 2021-01-23

## 2021-01-22 RX ORDER — HALOPERIDOL 5 MG/ML
2.5-5 INJECTION INTRAMUSCULAR EVERY 6 HOURS PRN
Status: DISCONTINUED | OUTPATIENT
Start: 2021-01-22 | End: 2021-01-27 | Stop reason: HOSPADM

## 2021-01-22 RX ORDER — OLANZAPINE 5 MG/1
5-10 TABLET, ORALLY DISINTEGRATING ORAL EVERY 6 HOURS PRN
Status: DISCONTINUED | OUTPATIENT
Start: 2021-01-22 | End: 2021-01-27 | Stop reason: HOSPADM

## 2021-01-22 RX ORDER — CEFTRIAXONE 1 G/1
1 INJECTION, POWDER, FOR SOLUTION INTRAMUSCULAR; INTRAVENOUS EVERY 24 HOURS
Status: DISCONTINUED | OUTPATIENT
Start: 2021-01-22 | End: 2021-01-23

## 2021-01-22 RX ORDER — DEXTROSE MONOHYDRATE 25 G/50ML
25-50 INJECTION, SOLUTION INTRAVENOUS
Status: DISCONTINUED | OUTPATIENT
Start: 2021-01-22 | End: 2021-01-22

## 2021-01-22 RX ORDER — TAMSULOSIN HYDROCHLORIDE 0.4 MG/1
0.4 CAPSULE ORAL DAILY
Status: DISCONTINUED | OUTPATIENT
Start: 2021-01-22 | End: 2021-01-24

## 2021-01-22 RX ORDER — CIPROFLOXACIN 2 MG/ML
400 INJECTION, SOLUTION INTRAVENOUS ONCE
Status: COMPLETED | OUTPATIENT
Start: 2021-01-22 | End: 2021-01-22

## 2021-01-22 RX ORDER — LORAZEPAM 1 MG/1
1-2 TABLET ORAL EVERY 30 MIN PRN
Status: DISCONTINUED | OUTPATIENT
Start: 2021-01-22 | End: 2021-01-27 | Stop reason: HOSPADM

## 2021-01-22 RX ORDER — ONDANSETRON 4 MG/1
4 TABLET, ORALLY DISINTEGRATING ORAL EVERY 6 HOURS PRN
Status: DISCONTINUED | OUTPATIENT
Start: 2021-01-22 | End: 2021-01-27 | Stop reason: HOSPADM

## 2021-01-22 RX ORDER — NICOTINE 21 MG/24HR
1 PATCH, TRANSDERMAL 24 HOURS TRANSDERMAL DAILY
Status: CANCELLED | OUTPATIENT
Start: 2021-01-22

## 2021-01-22 RX ORDER — NICOTINE POLACRILEX 4 MG
15-30 LOZENGE BUCCAL
Status: DISCONTINUED | OUTPATIENT
Start: 2021-01-22 | End: 2021-01-22

## 2021-01-22 RX ORDER — FOLIC ACID 1 MG/1
1 TABLET ORAL DAILY
Status: DISCONTINUED | OUTPATIENT
Start: 2021-01-22 | End: 2021-01-27 | Stop reason: HOSPADM

## 2021-01-22 RX ORDER — LORAZEPAM 2 MG/ML
1-2 INJECTION INTRAMUSCULAR EVERY 30 MIN PRN
Status: DISCONTINUED | OUTPATIENT
Start: 2021-01-22 | End: 2021-01-27 | Stop reason: HOSPADM

## 2021-01-22 RX ORDER — ONDANSETRON 2 MG/ML
4 INJECTION INTRAMUSCULAR; INTRAVENOUS ONCE
Status: COMPLETED | OUTPATIENT
Start: 2021-01-22 | End: 2021-01-22

## 2021-01-22 RX ORDER — PROCHLORPERAZINE MALEATE 5 MG
5 TABLET ORAL EVERY 6 HOURS PRN
Status: DISCONTINUED | OUTPATIENT
Start: 2021-01-22 | End: 2021-01-27 | Stop reason: HOSPADM

## 2021-01-22 RX ORDER — NICOTINE 21 MG/24HR
1 PATCH, TRANSDERMAL 24 HOURS TRANSDERMAL DAILY
Status: DISCONTINUED | OUTPATIENT
Start: 2021-01-22 | End: 2021-01-27 | Stop reason: HOSPADM

## 2021-01-22 RX ORDER — NICOTINE POLACRILEX 4 MG
15-30 LOZENGE BUCCAL
Status: DISCONTINUED | OUTPATIENT
Start: 2021-01-22 | End: 2021-01-27 | Stop reason: HOSPADM

## 2021-01-22 RX ORDER — ATENOLOL 25 MG/1
25 TABLET ORAL DAILY
COMMUNITY
End: 2021-06-06

## 2021-01-22 RX ORDER — CLONIDINE HYDROCHLORIDE 0.1 MG/1
0.1 TABLET ORAL EVERY 8 HOURS
Status: DISCONTINUED | OUTPATIENT
Start: 2021-01-22 | End: 2021-01-23

## 2021-01-22 RX ORDER — MULTIPLE VITAMINS W/ MINERALS TAB 9MG-400MCG
1 TAB ORAL DAILY
Status: DISCONTINUED | OUTPATIENT
Start: 2021-01-22 | End: 2021-01-27 | Stop reason: HOSPADM

## 2021-01-22 RX ORDER — POLYETHYLENE GLYCOL 3350 17 G/17G
17 POWDER, FOR SOLUTION ORAL DAILY PRN
Status: DISCONTINUED | OUTPATIENT
Start: 2021-01-22 | End: 2021-01-27 | Stop reason: HOSPADM

## 2021-01-22 RX ORDER — ONDANSETRON 2 MG/ML
4 INJECTION INTRAMUSCULAR; INTRAVENOUS EVERY 6 HOURS PRN
Status: DISCONTINUED | OUTPATIENT
Start: 2021-01-22 | End: 2021-01-27 | Stop reason: HOSPADM

## 2021-01-22 RX ORDER — TRAZODONE HYDROCHLORIDE 100 MG/1
100 TABLET ORAL AT BEDTIME
COMMUNITY
End: 2021-08-04

## 2021-01-22 RX ORDER — QUETIAPINE FUMARATE 50 MG/1
50 TABLET, FILM COATED ORAL DAILY PRN
Status: ON HOLD | COMMUNITY
End: 2021-01-27

## 2021-01-22 RX ORDER — LORAZEPAM 2 MG/ML
1 INJECTION INTRAMUSCULAR ONCE
Status: COMPLETED | OUTPATIENT
Start: 2021-01-22 | End: 2021-01-22

## 2021-01-22 RX ORDER — POLYETHYLENE GLYCOL 3350 17 G
2 POWDER IN PACKET (EA) ORAL
Status: CANCELLED | OUTPATIENT
Start: 2021-01-22

## 2021-01-22 RX ORDER — ALBUMIN (HUMAN) 12.5 G/50ML
12.5 SOLUTION INTRAVENOUS ONCE
Status: DISCONTINUED | OUTPATIENT
Start: 2021-01-22 | End: 2021-01-23

## 2021-01-22 RX ORDER — QUETIAPINE FUMARATE 100 MG/1
100 TABLET, FILM COATED ORAL AT BEDTIME
Status: DISCONTINUED | OUTPATIENT
Start: 2021-01-22 | End: 2021-01-27 | Stop reason: HOSPADM

## 2021-01-22 RX ORDER — FOLIC ACID 1 MG/1
1 TABLET ORAL DAILY
Status: DISCONTINUED | OUTPATIENT
Start: 2021-01-22 | End: 2021-01-22

## 2021-01-22 RX ORDER — OCTREOTIDE ACETATE 50 UG/ML
50 INJECTION, SOLUTION INTRAVENOUS; SUBCUTANEOUS ONCE
Status: DISCONTINUED | OUTPATIENT
Start: 2021-01-22 | End: 2021-01-22

## 2021-01-22 RX ORDER — ATENOLOL 25 MG/1
25 TABLET ORAL DAILY
Status: DISCONTINUED | OUTPATIENT
Start: 2021-01-22 | End: 2021-01-27 | Stop reason: HOSPADM

## 2021-01-22 RX ORDER — FLUMAZENIL 0.1 MG/ML
0.2 INJECTION, SOLUTION INTRAVENOUS
Status: DISCONTINUED | OUTPATIENT
Start: 2021-01-22 | End: 2021-01-27 | Stop reason: HOSPADM

## 2021-01-22 RX ADMIN — PANTOPRAZOLE SODIUM 8 MG/HR: 40 INJECTION, POWDER, FOR SOLUTION INTRAVENOUS at 14:51

## 2021-01-22 RX ADMIN — THIAMINE HCL TAB 100 MG 200 MG: 100 TAB at 19:42

## 2021-01-22 RX ADMIN — PROCHLORPERAZINE EDISYLATE 5 MG: 5 INJECTION INTRAMUSCULAR; INTRAVENOUS at 18:51

## 2021-01-22 RX ADMIN — ONDANSETRON 4 MG: 2 INJECTION INTRAMUSCULAR; INTRAVENOUS at 13:44

## 2021-01-22 RX ADMIN — OCTREOTIDE ACETATE 50 MCG/HR: 200 INJECTION, SOLUTION INTRAVENOUS; SUBCUTANEOUS at 14:50

## 2021-01-22 RX ADMIN — CIPROFLOXACIN 400 MG: 2 INJECTION, SOLUTION INTRAVENOUS at 13:44

## 2021-01-22 RX ADMIN — LIDOCAINE HYDROCHLORIDE 10 ML: 10 INJECTION, SOLUTION EPIDURAL; INFILTRATION; INTRACAUDAL; PERINEURAL at 15:39

## 2021-01-22 RX ADMIN — VORTIOXETINE 10 MG: 10 TABLET, FILM COATED ORAL at 19:43

## 2021-01-22 RX ADMIN — CLONIDINE HYDROCHLORIDE 0.1 MG: 0.1 TABLET ORAL at 19:43

## 2021-01-22 RX ADMIN — TRAZODONE HYDROCHLORIDE 100 MG: 100 TABLET ORAL at 22:37

## 2021-01-22 RX ADMIN — NICOTINE 1 PATCH: 21 PATCH, EXTENDED RELEASE TRANSDERMAL at 22:35

## 2021-01-22 RX ADMIN — FLUTICASONE FUROATE AND VILANTEROL TRIFENATATE 1 PUFF: 200; 25 POWDER RESPIRATORY (INHALATION) at 19:46

## 2021-01-22 RX ADMIN — PANTOPRAZOLE SODIUM 80 MG: 40 INJECTION, POWDER, FOR SOLUTION INTRAVENOUS at 11:09

## 2021-01-22 RX ADMIN — TAMSULOSIN HYDROCHLORIDE 0.4 MG: 0.4 CAPSULE ORAL at 19:44

## 2021-01-22 RX ADMIN — FOLIC ACID 1 MG: 1 TABLET ORAL at 19:43

## 2021-01-22 RX ADMIN — OCTREOTIDE ACETATE 50 MCG/HR: 200 INJECTION, SOLUTION INTRAVENOUS; SUBCUTANEOUS at 17:35

## 2021-01-22 RX ADMIN — MULTIPLE VITAMINS W/ MINERALS TAB 1 TABLET: TAB at 19:43

## 2021-01-22 RX ADMIN — MIRTAZAPINE 30 MG: 15 TABLET, FILM COATED ORAL at 22:37

## 2021-01-22 RX ADMIN — QUETIAPINE FUMARATE 100 MG: 100 TABLET ORAL at 22:36

## 2021-01-22 RX ADMIN — PANTOPRAZOLE SODIUM 40 MG: 40 INJECTION, POWDER, FOR SOLUTION INTRAVENOUS at 22:35

## 2021-01-22 RX ADMIN — LORAZEPAM 2 MG: 2 INJECTION INTRAMUSCULAR; INTRAVENOUS at 18:51

## 2021-01-22 RX ADMIN — LORAZEPAM 1 MG: 2 INJECTION INTRAMUSCULAR; INTRAVENOUS at 11:08

## 2021-01-22 RX ADMIN — OCTREOTIDE ACETATE 50 MCG: 50 INJECTION, SOLUTION INTRAVENOUS; SUBCUTANEOUS at 14:50

## 2021-01-22 RX ADMIN — THIAMINE HCL TAB 100 MG 200 MG: 100 TAB at 22:36

## 2021-01-22 RX ADMIN — ATENOLOL 25 MG: 25 TABLET ORAL at 19:44

## 2021-01-22 RX ADMIN — CEFTRIAXONE SODIUM 1 G: 1 INJECTION, POWDER, FOR SOLUTION INTRAMUSCULAR; INTRAVENOUS at 19:00

## 2021-01-22 RX ADMIN — LORAZEPAM 1 MG: 1 TABLET ORAL at 19:46

## 2021-01-22 ASSESSMENT — ENCOUNTER SYMPTOMS
ABDOMINAL PAIN: 0
SHORTNESS OF BREATH: 0
NAUSEA: 1
LIGHT-HEADEDNESS: 1
ABDOMINAL DISTENTION: 1
BLOOD IN STOOL: 1
WEAKNESS: 1

## 2021-01-22 ASSESSMENT — ACTIVITIES OF DAILY LIVING (ADL): ADLS_ACUITY_SCORE: 12

## 2021-01-22 ASSESSMENT — MIFFLIN-ST. JEOR: SCORE: 1600.46

## 2021-01-22 NOTE — ED NOTES
[facility]  ED Nurse Handoff Report    ED Chief complaint: Generalized Weakness      ED Diagnosis:   Final diagnoses:   Gastrointestinal hemorrhage, unspecified gastrointestinal hemorrhage type       Code Status: Full Code    Allergies:   Allergies   Allergen Reactions     Amlodipine Swelling     Lisinopril      Other reaction(s): Angioedema  Mouth and tongue swelling   Mouth and tongue swelling          Patient Story: Jim Richard is a 65 year old male with history of alcoholic cirrhosis, esophageal varices with bleeding, anemia, hypertension, hyperlipidemia, and alcoholism who presents via EMS for evaluation of generalized weakness. The patient was scheduled for a paracentesis today but felt too weak for the procedure and has noticed black stools for the past 4 hours. He also reports light-headedness, nausea, abdominal distension (similar distention to when he has required previous paracentesis procedures).   He has a history of esophageal bleed but states today's symptoms feel different. He denies chest pain and increased shortness of breath from baseline.   Focused Assessment:  Pt tested + for occult stool.  Initial hmg 8.7.  Repeat 2 hours later 8.1.  Patient states increasing discomfort in abdomen due to bloating and needing paracentesis.  Patient has intermit. Nausea treated with zofran.  Initially given 1mg ativan for tremors d/t potential alcohol withdrawal.  Pt states last drank yesterday around 4PM.  A/o x4.      Treatments and/or interventions provided: see MAR  Patient's response to treatments and/or interventions: good    To be done/followed up on inpatient unit:  na    Does this patient have any cognitive concerns?: na    Activity level - Baseline/Home:  Independent  Activity Level - Current:   Stand with Assist    Patient's Preferred language: English   Needed?: No    Isolation: None  Infection: Not Applicable  Patient tested for COVID 19 prior to admission:  YES  Bariatric?: No    Vital Signs:   Vitals:    01/22/21 1300 01/22/21 1330 01/22/21 1400 01/22/21 1430   BP: (!) 157/77 (!) 161/72 (!) 154/72 (!) 158/56   Pulse: 84 83 87 92   Resp:       Temp:       TempSrc:       SpO2: 95% 95% 96% 94%   Weight:       Height:           Cardiac Rhythm:     Was the PSS-3 completed:   Yes  What interventions are required if any?               Family Comments: na  OBS brochure/video discussed/provided to patient/family: N/A              Name of person given brochure if not patient:               Relationship to patient:     For the majority of the shift this patient's behavior was Green.   Behavioral interventions performed were .    ED NURSE PHONE NUMBER: *[phone]

## 2021-01-22 NOTE — PROCEDURES
[facility]    Procedure: Paracentesis    Date/Time: 1/22/2021 3:55 PM  Performed by: Dedra Cramer MD  Authorized by: Dedra Cramer MD     UNIVERSAL PROTOCOL   Site Marked: Yes  Prior Images Obtained and Reviewed:  Yes  Required items: Required blood products, implants, devices and special equipment available    Patient identity confirmed:  Verbally with patient  Patient was reevaluated immediately before administering moderate or deep sedation or anesthesia  Confirmation Checklist:  Patient's identity using two indicators, procedure was appropriate and matched the consent or emergent situation, correct equipment/implants were available and relevant allergies  Time out: Immediately prior to the procedure a time out was called    Universal Protocol: the Joint Commission Universal Protocol was followed    Preparation: Patient was prepped and draped in usual sterile fashion        PROCEDURE   Patient Tolerance:  Patient tolerated the procedure well with no immediate complications    Length of time physician/provider present for 1:1 monitoring during sedation: 0

## 2021-01-22 NOTE — PHARMACY-ADMISSION MEDICATION HISTORY
Pharmacy Medication History  Admission medication history interview status for the 1/22/2021  admission is complete. See EPIC admission navigator for prior to admission medications     Location of Interview: phone  Medication history sources: Patient  Medication history source reliability: Poor  Adherence assessment: Poor    Significant changes made to the medication list:  Patient has not been taking any medications for over 2 weeks. This list is what patient should be taking and last fills     In the past week, patient estimated taking medication this percent of the time: not been taking medications    Additional medication history information:       Medication reconciliation completed by provider prior to medication history? No    Time spent in this activity: 25min       Prior to Admission medications    Medication Sig Last Dose Taking? Auth Provider   atenolol (TENORMIN) 25 MG tablet Take 25 mg by mouth daily Filled 9/9/20 #30  Yes Unknown, Entered By History   mirtazapine (REMERON) 30 MG tablet Take 30 mg by mouth At Bedtime Filled 1/11/21 for #30  Yes Unknown, Entered By History   QUEtiapine (SEROQUEL) 100 MG tablet Take 100 mg by mouth At Bedtime Filled 1/11/21 #30  Yes Unknown, Entered By History   QUEtiapine (SEROQUEL) 50 MG tablet Take 50 mg by mouth daily as needed Filled 11/20/20 #30  Yes Unknown, Entered By History   traZODone (DESYREL) 100 MG tablet Take 100 mg by mouth At Bedtime Filled 1/11/21 #30  Yes Unknown, Entered By History   acetaminophen (TYLENOL) 325 MG tablet Take 2 tablets (650 mg) by mouth every 4 hours as needed for mild pain Unknown at Unknown time  Jeffrey Villagomez MD   fluticasone-vilanterol (BREO ELLIPTA) 200-25 MCG/INH inhaler Inhale 1 puff into the lungs daily as needed (SOB)  Patient taking differently: Inhale 1 puff into the lungs daily as needed (SOB) 8/27/20 last filled   Jeffrey Villagomez MD   folic acid (FOLVITE) 1 MG tablet Take 1 tablet (1 mg) by mouth  daily  Patient taking differently: Take 1 mg by mouth daily Last filled 11/21/20 #30   Jeffrey Villagomez MD   furosemide (LASIX) 20 MG tablet Take 0.5 tablets (10 mg) by mouth daily  Patient taking differently: Take 10 mg by mouth daily Filled 11/21/20 # 24   Jeffrey Villagomez MD   hydrOXYzine (ATARAX) 50 MG tablet Take 1 tablet (50 mg) by mouth nightly as needed for other (sleep)  Patient taking differently: Take 50 mg by mouth nightly as needed for other (sleep) Filled 1/11/21 #90   Jeffrey Villagomez MD   magnesium oxide (MAG-OX) 400 MG tablet Take 1 tablet (400 mg) by mouth daily  Patient taking differently: Take 400 mg by mouth daily Filled 11/21/20 #30   Jeffrey Villagomez MD   midodrine (PROAMATINE) 5 MG tablet Take 1 tablet (5 mg) by mouth 3 times daily (with meals)  Patient taking differently: Take 5 mg by mouth 3 times daily (with meals) Filled 11/21/20 #25 days   Jeffrey Villagomez MD   multivitamin w/minerals (THERA-VIT-M) tablet Take 1 tablet by mouth daily  Patient taking differently: Take 1 tablet by mouth daily Filled 9/21/21 #30   Jeffrey Villagomez MD   nicotine (COMMIT) 2 MG lozenge Place 1 lozenge (2 mg) inside cheek every hour as needed for smoking cessation  Patient taking differently: Place 2 mg inside cheek every hour as needed for smoking cessation Filled 11/21/20 #30   Jeffrey Villagomez MD   nicotine (NICODERM CQ) 21 MG/24HR 24 hr patch Place 1 patch onto the skin daily  Patient taking differently: Place 1 patch onto the skin daily Filled 11/20/21 #30   Jeffrey Villagomez MD   pantoprazole (PROTONIX) 40 MG EC tablet Take 1 tablet (40 mg) by mouth daily  Patient taking differently: Take 40 mg by mouth daily Filled 11/20/20 #30   Jeffrey Villagomez MD   tamsulosin (FLOMAX) 0.4 MG capsule Take 1 capsule (0.4 mg) by mouth daily  Patient taking differently: Take 0.4 mg by mouth daily Filled 11/20/20 #30   Jeffrey Villagomez MD   vortioxetine  (TRINTELLIX) 10 MG tablet Take 1 tablet (10 mg) by mouth daily  Patient taking differently: Take 10 mg by mouth daily Filled 11/20/20 #30   Jeffrey Villagomez MD Marci Jacobson PharmD

## 2021-01-22 NOTE — PROGRESS NOTES
RECEIVING UNIT ED HANDOFF REVIEW    ED Nurse Handoff Report was reviewed by: Glenn Shearer RN on January 22, 2021 at 4:11 PM

## 2021-01-22 NOTE — H&P
Admitted:     01/22/2021     PCP:  Dr. Talon Elias     CHIEF COMPLAINT:  Weakness with black stools.      HISTORY OF PRESENT ILLNESS:  Mr. Jim Richard is a 65-year-old male patient with history noted below, including alcoholic fatty liver with cirrhosis, history of varices and ascites; COPD with continued smoking; hypertension; depression/anxiety; obstructive sleep apnea; hyperlipidemia; BPH; and chronic kidney disease; who presents with the above complaints.  The patient has history of alcoholism and had actually stopped drinking for about 4 months until 01/01.  He has been drinking about 3/4 of a bottle of vodka every few days.      In the above context, the patient noticed he has been feeling weak.  He noted some black stools for the past 4 hours prior to presenting here.  He has had some lightheadedness and nausea, abdominal distention, known to have prior ascites.  No abdominal pain.  He has been very anxious.  No cough or fevers or chills, no chest pain.  Overall, given his issues, he came to [facility] for further evaluation.      The patient seen in the ER.  Vital signs showed temperature 99.6, heart rate 81, blood pressure 174/84, respiratory rate 19, O2 saturation 98%.  He had labs that showed hemoglobin 8.7.  Followup hemoglobin was checked and was 8.1.  BUN 25, creatinine 1.59.  He appeared tremulous and in withdrawal as he had last drank yesterday.  He was given some Ativan.  Overall, given his issues, request for admission was made.      PAST MEDICAL HISTORY:   1.  Alcoholic fatty liver with cirrhosis.     (a).  History of esophageal varices, grade 1 (last noted 06/2020).   (b).  History of ascites requiring paracentesis every 2 weeks.   2.  Chronic obstructive pulmonary disease with continued tobacco use.   3.  Hypertension.  4.  Depression/anxiety.   5.  Obstructive sleep apnea on CPAP.   6.  Hyperlipidemia.   7.  Benign prostatic hypertrophy.   8.  Chronic kidney disease with baseline  creatinine noted to be 1.4-1.9.   9.  Spinal stenosis.  History of spine surgery.  10.  History of subdural hematoma on the right in 2018.   11.  History of Clostridium difficile infection.     Past Medical History:   Diagnosis Date     Alcoholism (H) 1/14/2013    had treatment at [facility]     Anxiety      Coronary artery disease 09/09/2014    Nuclear stress test with slight fixed defect inferiorly, no ischemia     Depression     w/anxiety     Esophageal varices with bleeding 04/28/2018    6 varices banded on EGD     Heart attack (H)      Hepatomegaly     Fatty liver, chronic alcoholic     Hyperlipemia      Hypertension      JANIS on CPAP      Peripheral vascular disease (H)      PVD (peripheral vascular disease) (H)      SDH (subdural hematoma) (H) 04/26/2018    Right side, resolved spontaneously     Spinal stenosis      Substance abuse (H)        PAST SURGICAL HISTORY:   Past Surgical History:   Procedure Laterality Date     APPENDECTOMY       BYPASS GRAFT FEMOROPOPLITEAL  6/12/2012    Procedure: BYPASS GRAFT FEMOROPOPLITEAL;  LEFT FEMORAL TO ABOVE KNEE POPLITEAL BYPASS, ENDARTERECTOMY OF SFA AND PF ARTERIES, LEFT EXTERNAL ILIAC TO COMMON FEMORAL INTERPOSITION GRAFT;  Surgeon: Damir Roberts MD;  Location:  OR     COLONOSCOPY       COLONOSCOPY N/A 8/8/2019    Procedure: COLONOSCOPY;  Surgeon: Pavan Arguello MD;  Location:  GI     COLONOSCOPY N/A 3/10/2020    Procedure: COLONOSCOPY;  Surgeon: Rosendo Shaw MD;  Location:  GI     ENDARTERECTOMY FEMORAL  6/12/2012    Procedure: ENDARTERECTOMY FEMORAL;;  Surgeon: Damir Roberts MD;  Location:  OR     ESOPHAGOSCOPY, GASTROSCOPY, DUODENOSCOPY (EGD), COMBINED N/A 3/22/2018    Procedure: COMBINED ESOPHAGOSCOPY, GASTROSCOPY, DUODENOSCOPY (EGD);  ESOPHAGOSCOPY, GASTROSCOPY, DUODENOSOCPY.;  Surgeon: Valeri Pruitt MD;  Location:  OR     ESOPHAGOSCOPY, GASTROSCOPY, DUODENOSCOPY (EGD), COMBINED N/A 9/29/2018    Procedure: COMBINED  ESOPHAGOSCOPY, GASTROSCOPY, DUODENOSCOPY (EGD);;  Surgeon: Rosendo Shaw MD;  Location:  GI     ESOPHAGOSCOPY, GASTROSCOPY, DUODENOSCOPY (EGD), COMBINED N/A 8/2/2019    Procedure: ESOPHAGOGASTRODUODENOSCOPY (EGD);  Surgeon: Pavan Arguello MD;  Location:  GI     ESOPHAGOSCOPY, GASTROSCOPY, DUODENOSCOPY (EGD), COMBINED N/A 9/25/2019    Procedure: ESOPHAGOGASTRODUODENOSCOPY (EGD);  Surgeon: Pavan Arguello MD;  Location:  GI     ESOPHAGOSCOPY, GASTROSCOPY, DUODENOSCOPY (EGD), COMBINED N/A 3/8/2020    Procedure: ESOPHAGOGASTRODUODENOSCOPY (EGD);  Surgeon: Rosendo Shaw MD;  Location:  GI     ESOPHAGOSCOPY, GASTROSCOPY, DUODENOSCOPY (EGD), COMBINED N/A 6/11/2020    Procedure: ESOPHAGOGASTRODUODENOSCOPY (EGD);  Surgeon: Rosendo Shaw MD;  Location:  GI     HERNIA REPAIR  2017    Abdominal     LAMINECTOMY, FUSION LUMBAR THREE+ LEVEL, COMBINED N/A 9/22/2014    Procedure: COMBINED LAMINECTOMY, FUSION LUMBAR THREE+ LEVEL;  Surgeon: Sebastien Pruitt MD;  Location:  OR     TONSILLECTOMY & ADENOIDECTOMY          ALLERGIES:  AMLODIPINE, LISINOPRIL AND SULFA.      HOME MEDICATIONS:    Prior to Admission Medications   Prescriptions Last Dose Informant Patient Reported? Taking?   Lidocaine (LIDOCARE) 4 % Patch   No No   Sig: Place 1 patch onto the skin every 24 hours To prevent lidocaine toxicity, patient should be patch free for 12 hrs daily.   QUEtiapine (SEROQUEL) 200 MG tablet   No No   Sig: Take 1 tablet (200 mg) by mouth At Bedtime   QUEtiapine (SEROQUEL) 50 MG tablet   No No   Sig: Take 1 tablet (50 mg) by mouth daily as needed (anxiety)   acetaminophen (TYLENOL) 325 MG tablet   No No   Sig: Take 2 tablets (650 mg) by mouth every 4 hours as needed for mild pain   fluticasone-vilanterol (BREO ELLIPTA) 200-25 MCG/INH inhaler   No No   Sig: Inhale 1 puff into the lungs daily as needed (SOB)   folic acid (FOLVITE) 1 MG tablet   No No   Sig: Take 1 tablet (1 mg) by mouth daily   furosemide  (LASIX) 20 MG tablet   No No   Sig: Take 0.5 tablets (10 mg) by mouth daily   hydrOXYzine (ATARAX) 50 MG tablet   No No   Sig: Take 1 tablet (50 mg) by mouth nightly as needed for other (sleep)   magnesium oxide (MAG-OX) 400 MG tablet   No No   Sig: Take 1 tablet (400 mg) by mouth daily   midodrine (PROAMATINE) 5 MG tablet   No No   Sig: Take 1 tablet (5 mg) by mouth 3 times daily (with meals)   mirtazapine (REMERON) 15 MG tablet   No No   Sig: Take 1 tablet (15 mg) by mouth At Bedtime   multivitamin w/minerals (THERA-VIT-M) tablet   No No   Sig: Take 1 tablet by mouth daily   nadolol (CORGARD) 40 MG tablet   No No   Sig: Take 1 tablet (40 mg) by mouth daily   nicotine (COMMIT) 2 MG lozenge   No No   Sig: Place 1 lozenge (2 mg) inside cheek every hour as needed for smoking cessation   nicotine (NICODERM CQ) 21 MG/24HR 24 hr patch   No No   Sig: Place 1 patch onto the skin daily   pantoprazole (PROTONIX) 40 MG EC tablet   No No   Sig: Take 1 tablet (40 mg) by mouth daily   tamsulosin (FLOMAX) 0.4 MG capsule   No No   Sig: Take 1 capsule (0.4 mg) by mouth daily   vortioxetine (TRINTELLIX) 10 MG tablet   No No   Sig: Take 1 tablet (10 mg) by mouth daily      Facility-Administered Medications: None        SOCIAL HISTORY:  The patient smokes a pack a day for about 30 years.  He has been drinking about 3/4 of a bottle of vodka every few days.  He is , has 2 children.  Retired printer.      FAMILY HISTORY:  Reviewed and not felt to be contributory.      REVIEW OF SYSTEMS:  As noted in the HPI.  Otherwise, 10-point systems negative.      PHYSICAL EXAMINATION:   VITAL SIGNS:  Temperature 99.6, heart rate 81, blood pressure 161/72, respiratory rate 19, O2 saturation 95%.   GENERAL:  A 65-year-old male patient lying in bed, alert and somewhat anxious.  He is somewhat tremulous.   HEENT:  Pupils equal, round, reactive.  No scleral icterus, some conjunctival injection.  Oropharynx reveals no gross erythema or exudate.    NECK:  No bruits, JVD or adenopathy.   HEART:  Regular rhythm without murmurs, rubs or gallops.   LUNGS:  Diminished at the bases.  A few expiratory wheezes.   ABDOMEN:  Mildly distended but compressible.  Some discomfort with palpation, but no rebound or guarding.  Positive bowel sounds, no femoral bruits.   EXTREMITIES:  No edema.   NEUROLOGIC:  No gross focal motor or sensory deficits.      LABORATORY DATA AND IMAGING:  Results for orders placed or performed during the hospital encounter of 01/22/21   CBC with platelets differential     Status: Abnormal   Result Value Ref Range    WBC 4.9 4.0 - 11.0 10e9/L    RBC Count 2.79 (L) 4.4 - 5.9 10e12/L    Hemoglobin 8.7 (L) 13.3 - 17.7 g/dL    Hematocrit 26.7 (L) 40.0 - 53.0 %    MCV 96 78 - 100 fl    MCH 31.2 26.5 - 33.0 pg    MCHC 32.6 31.5 - 36.5 g/dL    RDW 15.5 (H) 10.0 - 15.0 %    Platelet Count 131 (L) 150 - 450 10e9/L    Diff Method Automated Method     % Neutrophils 78.7 %    % Lymphocytes 7.8 %    % Monocytes 11.5 %    % Eosinophils 0.6 %    % Basophils 1.2 %    % Immature Granulocytes 0.2 %    Nucleated RBCs 0 0 /100    Absolute Neutrophil 3.8 1.6 - 8.3 10e9/L    Absolute Lymphocytes 0.4 (L) 0.8 - 5.3 10e9/L    Absolute Monocytes 0.6 0.0 - 1.3 10e9/L    Absolute Eosinophils 0.0 0.0 - 0.7 10e9/L    Absolute Basophils 0.1 0.0 - 0.2 10e9/L    Abs Immature Granulocytes 0.0 0 - 0.4 10e9/L    Absolute Nucleated RBC 0.0    Comprehensive metabolic panel     Status: Abnormal   Result Value Ref Range    Sodium 137 133 - 144 mmol/L    Potassium 4.1 3.4 - 5.3 mmol/L    Chloride 104 94 - 109 mmol/L    Carbon Dioxide 17 (L) 20 - 32 mmol/L    Anion Gap 16 (H) 3 - 14 mmol/L    Glucose 66 (L) 70 - 99 mg/dL    Urea Nitrogen 25 7 - 30 mg/dL    Creatinine 1.59 (H) 0.66 - 1.25 mg/dL    GFR Estimate 44 (L) >60 mL/min/[1.73_m2]    GFR Estimate If Black 51 (L) >60 mL/min/[1.73_m2]    Calcium 8.3 (L) 8.5 - 10.1 mg/dL    Bilirubin Total 1.6 (H) 0.2 - 1.3 mg/dL    Albumin 2.9 (L) 3.4  - 5.0 g/dL    Protein Total 6.8 6.8 - 8.8 g/dL    Alkaline Phosphatase 279 (H) 40 - 150 U/L    ALT 42 0 - 70 U/L     (H) 0 - 45 U/L   INR     Status: None   Result Value Ref Range    INR 1.08 0.86 - 1.14   Partial thromboplastin time     Status: None   Result Value Ref Range    PTT 32 22 - 37 sec   Occult blood stool     Status: Abnormal   Result Value Ref Range    Occult Blood Positive (A) NEG^Negative   Hemoglobin     Status: Abnormal   Result Value Ref Range    Hemoglobin 8.1 (L) 13.3 - 17.7 g/dL   Glucose by meter     Status: Abnormal   Result Value Ref Range    Glucose 65 (L) 70 - 99 mg/dL   EKG 12-lead, tracing only     Status: None   Result Value Ref Range    Interpretation ECG Click View Image link to view waveform and result    ABO/Rh type and screen     Status: None   Result Value Ref Range    ABO A     RH(D) Neg     Antibody Screen Neg     Test Valid Only At [facility]        Specimen Expires 01/25/2021           ASSESSMENT AND PLAN:  Mr. Jim Richard is a 65-year-old male patient with history including alcoholic fatty liver with cirrhosis and history of varices and ascites; COPD; hypertension; depression/anxiety; obstructive sleep apnea; hyperlipidemia; BPH; and chronic kidney disease; who presents with weakness and black/melenic stools.     1.  Suspected upper GI bleed; history of varices:  The patient presents with black/melenic stools and weakness.  Hemoglobin 8.7 and dropped to 8.1 in the ED; his previous hemoglobin was 8.6 in 10/2020.  At this time, will continue octreotide drip.  Order Protonix 40 mg IV b.i.d.  Keep n.p.o.  Dr. Shaw has already been contacted and I appreciate his help.     2.  Acute on chronic anemia, suspect due to above:  The patient has chronic anemia.  Hemoglobin 8-9 range in the last year for the most part.  Hemoglobin dropped to 8.7 to 8.1 here in the ER.  At this time, will treat GI issues above.  Follow hemoglobin.  Transfuse if hemoglobin is  less than or equal to or if bleeding with hemodynamic instability or if symptomatic.  He has been consented.      3.  Alcoholic fatty liver with cirrhosis with history of esophageal varices and ascites:  He continues to drink alcohol.  He is due for paracentesis.  He does note that he has not been taking his medications including a diuretic.  I discussed with Dr. Shaw and no contra-indications to paracentesis at this time as he is fairly stable.  Will order a therapeutic paracentesis.  Hold furosemide for now given GI bleeding as above but resume once stable.  Order ceftriaxone for SBP antibiotic prophylaxis in the setting of GI bleeding source; would to change to PO ciprofloxacin at discharge and complete 7 days antibiotics.  Counseled strongly to cease alcohol use.     4.  Alcohol dependence with alcohol withdrawal:  He had stopped drinking for 4 months; however, he started drinking the first of the year.  Last drank yesterday, 01/21.  We will order CIWA protocol with p.r.n. lorazepam.  As above, counseled very strongly as above about ceasing alcohol use completely.    5.  Hypoglycemia, suspect due to decreased nutritional reserve related to alcohol abuse and liver disease.  Order p.r.n. hypoglycemia protocol.    6.  Chronic obstructive pulmonary disease:  Continues to smoke 1 pack per day of cigarettes.  Continue Breo Ellipta.  Order p.r.n. albuterol.     7.  Hypertension (benign essential):  BP's high now, could be related to alcohol withdrawal.  Continue atenolol.  Also on midodrine, unclear why; hold for now.    8.  Depression/anxiety:  Continue prior to admission mirtazapine, quetiapine and vortioxetine.  Continue p.r.n. hydroxyzine.     9.  Obstructive sleep apnea:  Continue CPAP.     10.  BPH:  Continue tamsulosin.     11.  CKD:  Baseline creatinine 1.4-1.9.  Creatinine 1.59 today.  Monitor BMP.  Avoid nephrotoxic medications.    12.  Prophylaxis:  Pneumoboots and ambulation.      CODE STATUS:  Full code.          TATUM JAIME JR., MD             D: 2021   T: 2021   MT: RAMEZ      Name:     DEVONTE SEGURA   MRN:      [id number]        Account:      [id number]   :      1955        Admitted:     2021                   Document: [id number]       cc: Talon Elias MD

## 2021-01-22 NOTE — ED PROVIDER NOTES
History   Chief Complaint:  Generalized Weakness       The history is provided by the patient and the EMS personnel.      Jim Richard is a 65 year old male with history of alcoholic cirrhosis, esophageal varices with bleeding, anemia, hypertension, hyperlipidemia, and alcoholism who presents via EMS for evaluation of generalized weakness. The patient was scheduled for a paracentesis today but felt too weak for the procedure and has noticed black stools for the past 4 hours. He also reports light-headedness, nausea, abdominal distension (similar distention to when he has required previous paracentesis procedures).  He denies abdominal pain. He has a history of esophageal bleed but states today's symptoms feel different. He denies chest pain and increased shortness of breath from baseline.     The patient has scheduled paracenteses every few weeks. Prior to this schedule, he had paracenteses when he became distended and short of breath. He has a history of blood transfusion and is comfortable with that plan of care today if needed.     He also reports that his last drink was yesterday, that he generally drinks every other day, and when he drinks he drinks approximately 750 mL of liquor.    Review of Systems   Respiratory: Negative for shortness of breath.    Cardiovascular: Negative for chest pain.   Gastrointestinal: Positive for abdominal distention, blood in stool and nausea. Negative for abdominal pain.   Neurological: Positive for weakness and light-headedness.   All other systems reviewed and are negative.      Allergies:  Amlodipine  Lisinopril    Medications:  Acetaminophen   Folic acid   Furosemide   Hydroxyzine    Magnesium oxide   Midodrine   Mirtazapine   Multivitamin w/minerals   Nadolol   Pantoprazole   Quetiapine    Tamsulosin   Vortioxetine     Past Medical History:     Acute blood loss anemia   Alcoholism  Alcoholic cirrhosis of liver with ascites   Alcoholic ketoacidosis   Anxiety  C  "Difficile colitis   Chronic pain syndrome   Coronary artery disease  Depression with suicidal ideation   Esophageal varices with bleeding  Heart attack  Hepatomegaly  Hyperlipidemia  Hypertension  Iron deficiency anemia   Lactic acidosis   JANIS on CPAP  Overdose, intentional self harm  Peripheral artery disease   Peripheral vascular disease  Subdural hematoma   Spinal stenosis   Substance abuse    Past Surgical History:    Appendectomy   Bypass graft femoropopliteal   Colonoscopy x3  Endarterectomy femoral  Esophagoscopy, gastroscopy, duodenoscopy, combined x6  Hernia repair, abdominal   Laminectomy, fusion lumbar 3+ level, combined   Tonsillectomy and adenoidectomy      Family History:    Coronary artery disease  Lung cancer   Mental illness  Substance abuse    Social History:  Presents unaccompanied to the ED  PCP: Talon Elias MD  Tobacco use: Positive  Alcohol use    Physical Exam     Patient Vitals for the past 24 hrs:   BP Temp Temp src Pulse Resp SpO2 Height Weight   01/22/21 1330 (!) 161/72 -- -- 83 -- 95 % -- --   01/22/21 1300 (!) 157/77 -- -- 84 -- 95 % -- --   01/22/21 1230 (!) 164/75 -- -- 87 -- -- -- --   01/22/21 1130 (!) 165/73 -- -- 79 -- 97 % -- --   01/22/21 1100 (!) 143/86 -- -- 95 -- 94 % -- --   01/22/21 1030 (!) 140/59 -- -- 85 -- 97 % -- --   01/22/21 1016 -- -- -- -- -- 98 % -- --   01/22/21 1014 (!) 174/84 99.6  F (37.6  C) Oral 81 19 -- 1.702 m (5' 7\") 86.2 kg (190 lb)       Physical Exam  General/Appearance:  appears mildly uncomfortable, mildly tremulous  Eyes: EOMI, no scleral injection, no icterus  ENT: MMM  Neck: supple, nl ROM, no stiffness  Cardiovascular: RRR, nl S1S2, no m/r/g, 2+ pulses in all 4 extremities, cap refill <2sec  Respiratory: CTAB, good air movement throughout, no wheezes/rhonchi/rales, no increased WOB, no retractions  Back: no lesions  GI: abd soft but distended, nttp,  no HSM, no rebound, no guarding, nl BS, guaiac not overtly bloody or black  MSK: JUSTINO, " good tone, no bony abnormality  Skin: warm and well-perfused, no rash, no edema  Neuro: GCS 15, alert and oriented  Psych: interacts appropriately    Emergency Department Course     ECG:  Indication: Generalized Weakness  Time: 1046  Vent. Rate 75 bpm. TX interval 144. QRS duration 142. QT/QTc 464/518. P-R-T axis 48 -31 28.  Normal sinus rhythm. Left axis deviation. Right bundle branch block. Abnormal ECG. No significant change compared to ECG dated 10/8/2020. Read time: 1055      Laboratory:  CBC: WBC: 4.9, HGB: 8.7(L), PLT: 131(L)  CMP: Glucose 66(L), Carbon Dioxide: 17(L), Anion Gap: 16(H), GFR: 44(L), Calcium: 8.3(L), Bilirubin Total: 1.6(H), Albumin: 2.9(L), Alkphos: 279(H), AST: 105(H), o/w WNL (Creatinine: 1.59(H))  ABO/Rh type and screen: A Neg, Antibody Screen: Negative     1352 Glucose by meter: 65(L)    PTT: 32  INR: 1.08  Hemoglobin: 8.1(L)    Occult Blood Stool: Positive     Asymptomatic COVID-19 Virus by PCR: In process     Procedures  None    Emergency Department Course:    Reviewed:  I reviewed nursing notes, vitals, past medical history and care everywhere    Assessments:  1030 I obtained history and examined the patient as noted above.   1056 I performed a rectal exam with the presence of a chaperone. A stool sample was sent for laboratory testing.   1310 I rechecked the patient and explained findings.       Consults:   1414 I consulted with Dr. Vences of the hospitalist services who is in agreement to accept the patient for admission.     Interventions:  1108 Ativan, 1 mg, IV  1109 Pantoprazole, 80 mg, IV  1344 Zofran, 4 mg, IV  1344 Ciprofloxacin, 400 mg, IV  1451 Pantoprazole, 8 mg/hr, IV  1450 Octreotide infusion, 50 mcg, IV  1450 Octreotide, 50 mcg/hr, IV    Disposition:  The patient was admitted to the hospital under the care of Dr. Vences.       Impression & Plan     Medical Decision Making:  This patient is a 65-year-old male with history of alcoholic cirrhosis and significant ascites requiring  frequent paracentesis, esophageal varices, anemia who presents with complaints of feeling generalized weakness as well as having several hours of dark black tarry stools.  I am concerned that he is having an upper GI bleed, likely from his varices, as his hemoglobin has dropped 2 hours and he has guaiac positive stools.  We are starting him on octreotide, Protonix, antibiotics.  He does not have any abdominal tenderness so I have low suspicion that his symptoms are present SBP.  He is due for a paracentesis today so this will need to be done while he is here on an inpatient basis.  Spoken with GI who plans to scope him either tonight or tomorrow but otherwise is in agreement with the plan outlined above.  He will be admitted to the hospitalist service.      Covid-19  Jim Richard was evaluated during a global COVID-19 pandemic, which necessitated consideration that the patient might be at risk for infection with the SARS-CoV-2 virus that causes COVID-19.   Applicable protocols for evaluation were followed during the patient's care.   COVID-19 was considered as part of the patient's evaluation. The plan for testing is:  a test was obtained during this visit.    Diagnosis:    ICD-10-CM    1. Gastrointestinal hemorrhage, unspecified gastrointestinal hemorrhage type  K92.2 Asymptomatic SARS-CoV-2 COVID-19 Virus (Coronavirus) by PCR     Glucose by meter     Glucose by meter         Scribe Disclosure:  AMBROSIO, Andreea Tristen, am serving as a scribe at 10:25 AM on 1/22/2021 to document services personally performed by Andreina Medellin MD based on my observations and the provider's statements to me.            Andreina Medellin MD  01/22/21 4028

## 2021-01-22 NOTE — PROGRESS NOTES
MD Notification    Notified Person: MD Vences    Notified Person Name:    Notification Date/Time: 1/22/21 7031    Notification Interaction: Web page    Purpose of Notification: Pt reported not urinating much for past few days. Did bladder scan and it was 401ml. Had pt try to void and he urinated 70ml, dark re in color. Wondering if you want us to straight cath?     Also could you please put in diet orders for pt?    Orders Received: none yet    Comments:

## 2021-01-22 NOTE — ED TRIAGE NOTES
Hx alcohol/liver cirrhosis. Supposed to get drained today but felt too weak and called 911.  Reports dark stools noticed the past 2 days.  Had a fall 1 week ago.

## 2021-01-23 PROBLEM — E87.20 ACIDOSIS: Status: RESOLVED | Noted: 2020-10-06 | Resolved: 2021-01-23

## 2021-01-23 PROBLEM — N17.9 AKI (ACUTE KIDNEY INJURY) (H): Status: ACTIVE | Noted: 2021-01-23

## 2021-01-23 PROBLEM — F17.200 SMOKER: Status: ACTIVE | Noted: 2018-09-29

## 2021-01-23 PROBLEM — R19.7 DIARRHEA, UNSPECIFIED TYPE: Status: RESOLVED | Noted: 2020-10-06 | Resolved: 2021-01-23

## 2021-01-23 PROBLEM — I85.00 ESOPHAGEAL VARICES (H): Status: ACTIVE | Noted: 2018-09-29

## 2021-01-23 PROBLEM — G47.33 OSA ON CPAP: Status: ACTIVE | Noted: 2018-09-29

## 2021-01-23 PROBLEM — K70.10 ALCOHOLIC HEPATITIS (H): Status: ACTIVE | Noted: 2018-09-29

## 2021-01-23 PROBLEM — R10.84 ABDOMINAL PAIN, GENERALIZED: Status: RESOLVED | Noted: 2020-10-01 | Resolved: 2021-01-23

## 2021-01-23 PROBLEM — A04.72 C. DIFFICILE COLITIS: Status: RESOLVED | Noted: 2020-10-14 | Resolved: 2021-01-23

## 2021-01-23 PROBLEM — K70.31 ALCOHOLIC CIRRHOSIS OF LIVER WITH ASCITES (H): Status: ACTIVE | Noted: 2020-02-06

## 2021-01-23 PROBLEM — J44.9 COPD (CHRONIC OBSTRUCTIVE PULMONARY DISEASE) (H): Status: ACTIVE | Noted: 2018-09-29

## 2021-01-23 PROBLEM — K92.1 MELENA: Status: RESOLVED | Noted: 2019-09-25 | Resolved: 2021-01-23

## 2021-01-23 PROBLEM — D62 ACUTE BLOOD LOSS ANEMIA: Status: ACTIVE | Noted: 2020-03-07

## 2021-01-23 PROBLEM — I10 ESSENTIAL HYPERTENSION: Status: ACTIVE | Noted: 2018-09-29

## 2021-01-23 PROBLEM — D69.6 THROMBOCYTOPENIA (H): Status: ACTIVE | Noted: 2021-01-23

## 2021-01-23 LAB
ALBUMIN SERPL-MCNC: 2.4 G/DL (ref 3.4–5)
ALP SERPL-CCNC: 208 U/L (ref 40–150)
ALT SERPL W P-5'-P-CCNC: 35 U/L (ref 0–70)
ANION GAP SERPL CALCULATED.3IONS-SCNC: 11 MMOL/L (ref 3–14)
AST SERPL W P-5'-P-CCNC: 84 U/L (ref 0–45)
BASOPHILS # BLD AUTO: 0 10E9/L (ref 0–0.2)
BASOPHILS NFR BLD AUTO: 1.4 %
BILIRUB DIRECT SERPL-MCNC: 0.8 MG/DL (ref 0–0.2)
BILIRUB SERPL-MCNC: 1.6 MG/DL (ref 0.2–1.3)
BUN SERPL-MCNC: 31 MG/DL (ref 7–30)
CALCIUM SERPL-MCNC: 8.2 MG/DL (ref 8.5–10.1)
CHLORIDE SERPL-SCNC: 104 MMOL/L (ref 94–109)
CO2 SERPL-SCNC: 21 MMOL/L (ref 20–32)
CREAT SERPL-MCNC: 2.2 MG/DL (ref 0.66–1.25)
DIFFERENTIAL METHOD BLD: ABNORMAL
EOSINOPHIL # BLD AUTO: 0 10E9/L (ref 0–0.7)
EOSINOPHIL NFR BLD AUTO: 1.8 %
ERYTHROCYTE [DISTWIDTH] IN BLOOD BY AUTOMATED COUNT: 15.2 % (ref 10–15)
GFR SERPL CREATININE-BSD FRML MDRD: 30 ML/MIN/{1.73_M2}
GLUCOSE SERPL-MCNC: 75 MG/DL (ref 70–99)
HCT VFR BLD AUTO: 23.5 % (ref 40–53)
HGB BLD-MCNC: 7.5 G/DL (ref 13.3–17.7)
HGB BLD-MCNC: 7.8 G/DL (ref 13.3–17.7)
IMM GRANULOCYTES # BLD: 0 10E9/L (ref 0–0.4)
IMM GRANULOCYTES NFR BLD: 0 %
LACTATE BLD-SCNC: 0.9 MMOL/L (ref 0.7–2)
LYMPHOCYTES # BLD AUTO: 0.3 10E9/L (ref 0.8–5.3)
LYMPHOCYTES NFR BLD AUTO: 14 %
MCH RBC QN AUTO: 31.3 PG (ref 26.5–33)
MCHC RBC AUTO-ENTMCNC: 31.9 G/DL (ref 31.5–36.5)
MCV RBC AUTO: 98 FL (ref 78–100)
MONOCYTES # BLD AUTO: 0.4 10E9/L (ref 0–1.3)
MONOCYTES NFR BLD AUTO: 17.2 %
NEUTROPHILS # BLD AUTO: 1.5 10E9/L (ref 1.6–8.3)
NEUTROPHILS NFR BLD AUTO: 65.6 %
NRBC # BLD AUTO: 0 10*3/UL
NRBC BLD AUTO-RTO: 0 /100
PLATELET # BLD AUTO: 65 10E9/L (ref 150–450)
POTASSIUM SERPL-SCNC: 4.5 MMOL/L (ref 3.4–5.3)
PROT SERPL-MCNC: 5.6 G/DL (ref 6.8–8.8)
RBC # BLD AUTO: 2.4 10E12/L (ref 4.4–5.9)
SODIUM SERPL-SCNC: 136 MMOL/L (ref 133–144)
UPPER GI ENDOSCOPY: NORMAL
WBC # BLD AUTO: 2.2 10E9/L (ref 4–11)

## 2021-01-23 PROCEDURE — 250N000011 HC RX IP 250 OP 636: Performed by: INTERNAL MEDICINE

## 2021-01-23 PROCEDURE — 999N000157 HC STATISTIC RCP TIME EA 10 MIN

## 2021-01-23 PROCEDURE — 85025 COMPLETE CBC W/AUTO DIFF WBC: CPT | Performed by: INTERNAL MEDICINE

## 2021-01-23 PROCEDURE — 85018 HEMOGLOBIN: CPT | Performed by: INTERNAL MEDICINE

## 2021-01-23 PROCEDURE — 250N000013 HC RX MED GY IP 250 OP 250 PS 637: Performed by: INTERNAL MEDICINE

## 2021-01-23 PROCEDURE — P9041 ALBUMIN (HUMAN),5%, 50ML: HCPCS | Performed by: INTERNAL MEDICINE

## 2021-01-23 PROCEDURE — 80076 HEPATIC FUNCTION PANEL: CPT | Performed by: INTERNAL MEDICINE

## 2021-01-23 PROCEDURE — 94660 CPAP INITIATION&MGMT: CPT

## 2021-01-23 PROCEDURE — 43235 EGD DIAGNOSTIC BRUSH WASH: CPT | Performed by: INTERNAL MEDICINE

## 2021-01-23 PROCEDURE — 36415 COLL VENOUS BLD VENIPUNCTURE: CPT | Performed by: INTERNAL MEDICINE

## 2021-01-23 PROCEDURE — 120N000001 HC R&B MED SURG/OB

## 2021-01-23 PROCEDURE — 83605 ASSAY OF LACTIC ACID: CPT | Performed by: INTERNAL MEDICINE

## 2021-01-23 PROCEDURE — 250N000009 HC RX 250: Performed by: INTERNAL MEDICINE

## 2021-01-23 PROCEDURE — 99232 SBSQ HOSP IP/OBS MODERATE 35: CPT | Performed by: INTERNAL MEDICINE

## 2021-01-23 PROCEDURE — 0DJ08ZZ INSPECTION OF UPPER INTESTINAL TRACT, VIA NATURAL OR ARTIFICIAL OPENING ENDOSCOPIC: ICD-10-PCS | Performed by: INTERNAL MEDICINE

## 2021-01-23 PROCEDURE — 80048 BASIC METABOLIC PNL TOTAL CA: CPT | Performed by: INTERNAL MEDICINE

## 2021-01-23 PROCEDURE — G0500 MOD SEDAT ENDO SERVICE >5YRS: HCPCS | Performed by: INTERNAL MEDICINE

## 2021-01-23 RX ORDER — LIDOCAINE 40 MG/G
CREAM TOPICAL
Status: DISCONTINUED | OUTPATIENT
Start: 2021-01-23 | End: 2021-01-23 | Stop reason: HOSPADM

## 2021-01-23 RX ORDER — FENTANYL CITRATE 50 UG/ML
INJECTION, SOLUTION INTRAMUSCULAR; INTRAVENOUS PRN
Status: DISCONTINUED | OUTPATIENT
Start: 2021-01-23 | End: 2021-01-23 | Stop reason: HOSPADM

## 2021-01-23 RX ORDER — PANTOPRAZOLE SODIUM 40 MG/1
40 TABLET, DELAYED RELEASE ORAL
Status: DISCONTINUED | OUTPATIENT
Start: 2021-01-23 | End: 2021-01-27 | Stop reason: HOSPADM

## 2021-01-23 RX ORDER — QUETIAPINE FUMARATE 25 MG/1
50 TABLET, FILM COATED ORAL 3 TIMES DAILY PRN
Status: DISCONTINUED | OUTPATIENT
Start: 2021-01-23 | End: 2021-01-27 | Stop reason: HOSPADM

## 2021-01-23 RX ORDER — NALOXONE HYDROCHLORIDE 0.4 MG/ML
0.4 INJECTION, SOLUTION INTRAMUSCULAR; INTRAVENOUS; SUBCUTANEOUS
Status: ACTIVE | OUTPATIENT
Start: 2021-01-23 | End: 2021-01-24

## 2021-01-23 RX ORDER — FLUMAZENIL 0.1 MG/ML
0.2 INJECTION, SOLUTION INTRAVENOUS
Status: DISCONTINUED | OUTPATIENT
Start: 2021-01-23 | End: 2021-01-23

## 2021-01-23 RX ORDER — CLONIDINE HYDROCHLORIDE 0.1 MG/1
0.1 TABLET ORAL 2 TIMES DAILY
Status: DISCONTINUED | OUTPATIENT
Start: 2021-01-23 | End: 2021-01-26

## 2021-01-23 RX ORDER — LANOLIN ALCOHOL/MO/W.PET/CERES
100 CREAM (GRAM) TOPICAL DAILY
Status: COMPLETED | OUTPATIENT
Start: 2021-01-24 | End: 2021-01-25

## 2021-01-23 RX ORDER — ALBUMIN, HUMAN INJ 5% 5 %
500 SOLUTION INTRAVENOUS ONCE
Status: COMPLETED | OUTPATIENT
Start: 2021-01-23 | End: 2021-01-23

## 2021-01-23 RX ORDER — NALOXONE HYDROCHLORIDE 0.4 MG/ML
0.2 INJECTION, SOLUTION INTRAMUSCULAR; INTRAVENOUS; SUBCUTANEOUS
Status: ACTIVE | OUTPATIENT
Start: 2021-01-23 | End: 2021-01-24

## 2021-01-23 RX ADMIN — HYDROXYZINE HYDROCHLORIDE 50 MG: 25 TABLET, FILM COATED ORAL at 23:37

## 2021-01-23 RX ADMIN — QUETIAPINE FUMARATE 100 MG: 100 TABLET ORAL at 22:16

## 2021-01-23 RX ADMIN — QUETIAPINE 50 MG: 25 TABLET, FILM COATED ORAL at 13:53

## 2021-01-23 RX ADMIN — PANTOPRAZOLE SODIUM 40 MG: 40 TABLET, DELAYED RELEASE ORAL at 16:09

## 2021-01-23 RX ADMIN — TRAZODONE HYDROCHLORIDE 100 MG: 100 TABLET ORAL at 22:16

## 2021-01-23 RX ADMIN — POLYETHYLENE GLYCOL 3350 17 G: 17 POWDER, FOR SOLUTION ORAL at 13:53

## 2021-01-23 RX ADMIN — ONDANSETRON 4 MG: 2 INJECTION INTRAMUSCULAR; INTRAVENOUS at 07:43

## 2021-01-23 RX ADMIN — NICOTINE 1 PATCH: 21 PATCH, EXTENDED RELEASE TRANSDERMAL at 11:54

## 2021-01-23 RX ADMIN — VORTIOXETINE 10 MG: 10 TABLET, FILM COATED ORAL at 11:53

## 2021-01-23 RX ADMIN — THIAMINE HCL TAB 100 MG 200 MG: 100 TAB at 11:54

## 2021-01-23 RX ADMIN — TAMSULOSIN HYDROCHLORIDE 0.4 MG: 0.4 CAPSULE ORAL at 11:53

## 2021-01-23 RX ADMIN — FOLIC ACID 1 MG: 1 TABLET ORAL at 11:53

## 2021-01-23 RX ADMIN — MULTIPLE VITAMINS W/ MINERALS TAB 1 TABLET: TAB at 11:53

## 2021-01-23 RX ADMIN — CLONIDINE HYDROCHLORIDE 0.1 MG: 0.1 TABLET ORAL at 22:22

## 2021-01-23 RX ADMIN — FLUTICASONE FUROATE AND VILANTEROL TRIFENATATE 1 PUFF: 200; 25 POWDER RESPIRATORY (INHALATION) at 11:52

## 2021-01-23 RX ADMIN — MIRTAZAPINE 30 MG: 15 TABLET, FILM COATED ORAL at 22:17

## 2021-01-23 RX ADMIN — ALBUMIN HUMAN 500 ML: 0.05 INJECTION, SOLUTION INTRAVENOUS at 11:59

## 2021-01-23 ASSESSMENT — ACTIVITIES OF DAILY LIVING (ADL)
ADLS_ACUITY_SCORE: 13
TOILETING_ISSUES: NO
DIFFICULTY_COMMUNICATING: NO
DIFFICULTY_EATING/SWALLOWING: NO
DRESSING/BATHING_DIFFICULTY: NO
ADLS_ACUITY_SCORE: 13
ADLS_ACUITY_SCORE: 11
ADLS_ACUITY_SCORE: 13

## 2021-01-23 ASSESSMENT — MIFFLIN-ST. JEOR: SCORE: 1492.63

## 2021-01-23 NOTE — CONSULTS
[facility]    Gastroenterology Consultation    Date of Admission:  1/22/2021    History of Present Illness   Jim Richard is a 65 year old male who presents with ascites and GI consulted for anemia and melena, potentially for GI bleeding.    Pt is well known to our service w/ hx of COPD with continued smoking; hypertension; depression/anxiety; obstructive sleep apnea; hyperlipidemia; BPH; and chronic kidney disease but most importantly Etoh abuse w/ decompensated liver cirrhosis w/ ascites and varices but no prior bleeding. Pt was sober for a few months since last admission but again relapsed this year w/ last drink yesterday. Yesterday he also developed melena about episodes w/ 1 small amount today. He denies any abdominal pain and no NSAID use. He has not really followed up w/ any physician besides PCP since discharge. He is no s/p paracentesis w/ 3 L removed.     Assessment & Plan   Jim Richard is a 65 year old male who was admitted on 1/22/2021. I was asked to see the patient for melena w/ possible GI bleeding.  -currently vitals stable, last EGD done in 6/2020 w/ grade I varix and otherwise normal  -will recommend to start octreotide, IV PPI and ceftriaxone given ascites w/ GI bleeding  -will tentatively plan for EGD tmr am or earlier if clinical status has changed  -regarding his ascites, he couldn't tolerate diuretics in the past which he now require paracentesis Q1-2 weeks  -he may be a candidate for TIPs given persistent ascites, no HE and prior normal ejection fraction, however he is still constantly drinking   -will recommend chemical dependence consult again    [address] level 2 consult      Active Problems:    GI bleed      Pavan Arguello MD  ([facility]  Office: [phone]  Cell: [phone], please feel free to call the cell    Code Status    Full Code      Primary Care Physician   FERNANDA Arguello MD   ([facility]  Office: [phone]  Cell: [phone], please feel free to call the cell      Past Medical History   I have reviewed this patient's medical history and updated it with pertinent information if needed.   Past Medical History:   Diagnosis Date     Alcoholism (H) 1/14/2013    had treatment at [facility]     Anxiety      Coronary artery disease 09/09/2014    Nuclear stress test with slight fixed defect inferiorly, no ischemia     Depression     w/anxiety     Esophageal varices with bleeding 04/28/2018    6 varices banded on EGD     Heart attack (H)      Hepatomegaly     Fatty liver, chronic alcoholic     Hyperlipemia      Hypertension      JANIS on CPAP      Peripheral vascular disease (H)      PVD (peripheral vascular disease) (H)      SDH (subdural hematoma) (H) 04/26/2018    Right side, resolved spontaneously     Spinal stenosis      Substance abuse (H)        Past Surgical History   I have reviewed this patient's surgical history and updated it with pertinent information if needed.  Past Surgical History:   Procedure Laterality Date     APPENDECTOMY       BYPASS GRAFT FEMOROPOPLITEAL  6/12/2012    Procedure: BYPASS GRAFT FEMOROPOPLITEAL;  LEFT FEMORAL TO ABOVE KNEE POPLITEAL BYPASS, ENDARTERECTOMY OF SFA AND PF ARTERIES, LEFT EXTERNAL ILIAC TO COMMON FEMORAL INTERPOSITION GRAFT;  Surgeon: Damir Roberts MD;  Location:  OR     COLONOSCOPY       COLONOSCOPY N/A 8/8/2019    Procedure: COLONOSCOPY;  Surgeon: Pavan Arguello MD;  Location:  GI     COLONOSCOPY N/A 3/10/2020    Procedure: COLONOSCOPY;  Surgeon: Rosendo [facility] MD;  Location:  GI     ENDARTERECTOMY FEMORAL  6/12/2012    Procedure: ENDARTERECTOMY FEMORAL;;  Surgeon: Damir Roberts MD;  Location:  OR     ESOPHAGOSCOPY, GASTROSCOPY, DUODENOSCOPY (EGD), COMBINED N/A 3/22/2018    Procedure: COMBINED ESOPHAGOSCOPY, GASTROSCOPY, DUODENOSCOPY (EGD);  ESOPHAGOSCOPY, GASTROSCOPY,  DUODENOSOCPY.;  Surgeon: Valeri Pruitt MD;  Location:  OR     ESOPHAGOSCOPY, GASTROSCOPY, DUODENOSCOPY (EGD), COMBINED N/A 9/29/2018    Procedure: COMBINED ESOPHAGOSCOPY, GASTROSCOPY, DUODENOSCOPY (EGD);;  Surgeon: Rosendo [facility] MD;  Location:  GI     ESOPHAGOSCOPY, GASTROSCOPY, DUODENOSCOPY (EGD), COMBINED N/A 8/2/2019    Procedure: ESOPHAGOGASTRODUODENOSCOPY (EGD);  Surgeon: Pavan Arguello MD;  Location:  GI     ESOPHAGOSCOPY, GASTROSCOPY, DUODENOSCOPY (EGD), COMBINED N/A 9/25/2019    Procedure: ESOPHAGOGASTRODUODENOSCOPY (EGD);  Surgeon: Pavan Arguello MD;  Location:  GI     ESOPHAGOSCOPY, GASTROSCOPY, DUODENOSCOPY (EGD), COMBINED N/A 3/8/2020    Procedure: ESOPHAGOGASTRODUODENOSCOPY (EGD);  Surgeon: Rosendo [facility] MD;  Location:  GI     ESOPHAGOSCOPY, GASTROSCOPY, DUODENOSCOPY (EGD), COMBINED N/A 6/11/2020    Procedure: ESOPHAGOGASTRODUODENOSCOPY (EGD);  Surgeon: Rosendo Shwa MD;  Location:  GI     HERNIA REPAIR  2017    Abdominal     LAMINECTOMY, FUSION LUMBAR THREE+ LEVEL, COMBINED N/A 9/22/2014    Procedure: COMBINED LAMINECTOMY, FUSION LUMBAR THREE+ LEVEL;  Surgeon: Sebastien Pruitt MD;  Location: [facility]     TONSILLECTOMY & ADENOIDECTOMY         Prior to Admission Medications   Prior to Admission Medications   Prescriptions Last Dose Informant Patient Reported? Taking?   QUEtiapine (SEROQUEL) 100 MG tablet   Yes Yes   Sig: Take 100 mg by mouth At Bedtime Filled 1/11/21 #30   QUEtiapine (SEROQUEL) 50 MG tablet   Yes Yes   Sig: Take 50 mg by mouth daily as needed Filled 11/20/20 #30   acetaminophen (TYLENOL) 325 MG tablet Unknown at Unknown time  No No   Sig: Take 2 tablets (650 mg) by mouth every 4 hours as needed for mild pain   atenolol (TENORMIN) 25 MG tablet   Yes Yes   Sig: Take 25 mg by mouth daily Filled 9/9/20 #30   fluticasone-vilanterol (BREO ELLIPTA) 200-25 MCG/INH inhaler   No No   Sig: Inhale 1 puff into the lungs daily as needed (SOB)   Patient  taking differently: Inhale 1 puff into the lungs daily as needed (SOB) 8/27/20 last filled   folic acid (FOLVITE) 1 MG tablet   No No   Sig: Take 1 tablet (1 mg) by mouth daily   Patient taking differently: Take 1 mg by mouth daily Last filled 11/21/20 #30   furosemide (LASIX) 20 MG tablet   No No   Sig: Take 0.5 tablets (10 mg) by mouth daily   Patient taking differently: Take 10 mg by mouth daily Filled 11/21/20 # 24   hydrOXYzine (ATARAX) 50 MG tablet   No No   Sig: Take 1 tablet (50 mg) by mouth nightly as needed for other (sleep)   Patient taking differently: Take 50 mg by mouth nightly as needed for other (sleep) Filled 1/11/21 #90   magnesium oxide (MAG-OX) 400 MG tablet   No No   Sig: Take 1 tablet (400 mg) by mouth daily   Patient taking differently: Take 400 mg by mouth daily Filled 11/21/20 #30   midodrine (PROAMATINE) 5 MG tablet   No No   Sig: Take 1 tablet (5 mg) by mouth 3 times daily (with meals)   Patient taking differently: Take 5 mg by mouth 3 times daily (with meals) Filled 11/21/20 #25 days   mirtazapine (REMERON) 30 MG tablet   Yes Yes   Sig: Take 30 mg by mouth At Bedtime Filled 1/11/21 for #30   multivitamin w/minerals (THERA-VIT-M) tablet   No No   Sig: Take 1 tablet by mouth daily   Patient taking differently: Take 1 tablet by mouth daily Filled 9/21/21 #30   nicotine (COMMIT) 2 MG lozenge   No No   Sig: Place 1 lozenge (2 mg) inside cheek every hour as needed for smoking cessation   Patient taking differently: Place 2 mg inside cheek every hour as needed for smoking cessation Filled 11/21/20 #30   nicotine (NICODERM CQ) 21 MG/24HR 24 hr patch   No No   Sig: Place 1 patch onto the skin daily   Patient taking differently: Place 1 patch onto the skin daily Filled 11/20/21 #30   pantoprazole (PROTONIX) 40 MG EC tablet   No No   Sig: Take 1 tablet (40 mg) by mouth daily   Patient taking differently: Take 40 mg by mouth daily Filled 11/20/20 #30   tamsulosin (FLOMAX) 0.4 MG capsule   No No    Sig: Take 1 capsule (0.4 mg) by mouth daily   Patient taking differently: Take 0.4 mg by mouth daily Filled 11/20/20 #30   traZODone (DESYREL) 100 MG tablet   Yes Yes   Sig: Take 100 mg by mouth At Bedtime Filled 1/11/21 #30   vortioxetine (TRINTELLIX) 10 MG tablet   No No   Sig: Take 1 tablet (10 mg) by mouth daily   Patient taking differently: Take 10 mg by mouth daily Filled 11/20/20 #30      Facility-Administered Medications: None     Allergies   Allergies   Allergen Reactions     Amlodipine Swelling     Lisinopril      Other reaction(s): Angioedema  Mouth and tongue swelling   Mouth and tongue swelling          Social History   I have reviewed this patient's social history and updated it with pertinent information if needed. Jim CALDERON Jose Manuel  reports that he has been smoking cigarettes. He has been smoking about 1.00 pack per day. He has never used smokeless tobacco. He reports current alcohol use. He reports that he does not use drugs.    Family History   I have reviewed this patient's family history and updated it with pertinent information if needed.   Family History   Problem Relation Age of Onset     Mental Illness Son      Coronary Artery Disease Early Onset Mother      Substance Abuse Father      Lung Cancer Father      Substance Abuse Brother      Unknown/Adopted No family hx of      Depression No family hx of      Anxiety Disorder No family hx of      Schizophrenia No family hx of      Bipolar Disorder No family hx of      Suicide No family hx of      Dementia No family hx of      Anthony Disease No family hx of      Parkinsonism No family hx of      Autism Spectrum Disorder No family hx of      Intellectual Disability (Mental Retardation) No family hx of        Review of Systems   The 10 point Review of Systems is negative other than noted in the HPI or here.     Physical Exam   Temp: 99.5  F (37.5  C) Temp src: Oral BP: 139/57 Pulse: 87   Resp: 16 SpO2: 93 % O2 Device: None (Room air)    Vital  Signs with Ranges  Temp:  [99.5  F (37.5  C)-100.5  F (38.1  C)] 99.5  F (37.5  C)  Pulse:  [78-95] 87  Resp:  [16-20] 16  BP: (133-174)/(56-86) 139/57  SpO2:  [92 %-98 %] 93 %  190 lbs 0 oz    Exam:  Constitutional: healthy, alert and no distress  Head: Normocephalic. No masses, lesions, tenderness or abnormalities  Neck: Neck supple. No adenopathy. Thyroid symmetric, normal size,, Carotids without bruits.  ENT: ENT exam normal, no neck nodes or sinus tenderness  Cardiovascular: negative, PMI normal. No lifts, heaves, or thrills. RRR. No murmurs, clicks gallops or rub  Respiratory: negative, Percussion normal. Good diaphragmatic excursion. Lungs clear  Gastrointestinal: Abdomen soft, non-tender but distended. BS normal. No masses, organomegaly  : Deferred  Musculoskeletal: extremities normal- no gross deformities noted, gait normal and normal muscle tone  Skin: no suspicious lesions or rashes  Neurologic: Gait normal. Reflexes normal and symmetric. Sensation grossly WNL.  Psychiatric: mentation appears normal and affect normal/bright  Hematologic/Lymphatic/Immunologic: Normal cervical lymph nodes     Data   Results for orders placed or performed during the hospital encounter of 01/22/21 (from the past 24 hour(s))   CBC with platelets differential   Result Value Ref Range    WBC 4.9 4.0 - 11.0 10e9/L    RBC Count 2.79 (L) 4.4 - 5.9 10e12/L    Hemoglobin 8.7 (L) 13.3 - 17.7 g/dL    Hematocrit 26.7 (L) 40.0 - 53.0 %    MCV 96 78 - 100 fl    MCH 31.2 26.5 - 33.0 pg    MCHC 32.6 31.5 - 36.5 g/dL    RDW 15.5 (H) 10.0 - 15.0 %    Platelet Count 131 (L) 150 - 450 10e9/L    Diff Method Automated Method     % Neutrophils 78.7 %    % Lymphocytes 7.8 %    % Monocytes 11.5 %    % Eosinophils 0.6 %    % Basophils 1.2 %    % Immature Granulocytes 0.2 %    Nucleated RBCs 0 0 /100    Absolute Neutrophil 3.8 1.6 - 8.3 10e9/L    Absolute Lymphocytes 0.4 (L) 0.8 - 5.3 10e9/L    Absolute Monocytes 0.6 0.0 - 1.3 10e9/L    Absolute  Eosinophils 0.0 0.0 - 0.7 10e9/L    Absolute Basophils 0.1 0.0 - 0.2 10e9/L    Abs Immature Granulocytes 0.0 0 - 0.4 10e9/L    Absolute Nucleated RBC 0.0    Comprehensive metabolic panel   Result Value Ref Range    Sodium 137 133 - 144 mmol/L    Potassium 4.1 3.4 - 5.3 mmol/L    Chloride 104 94 - 109 mmol/L    Carbon Dioxide 17 (L) 20 - 32 mmol/L    Anion Gap 16 (H) 3 - 14 mmol/L    Glucose 66 (L) 70 - 99 mg/dL    Urea Nitrogen 25 7 - 30 mg/dL    Creatinine 1.59 (H) 0.66 - 1.25 mg/dL    GFR Estimate 44 (L) >60 mL/min/[1.73_m2]    GFR Estimate If Black 51 (L) >60 mL/min/[1.73_m2]    Calcium 8.3 (L) 8.5 - 10.1 mg/dL    Bilirubin Total 1.6 (H) 0.2 - 1.3 mg/dL    Albumin 2.9 (L) 3.4 - 5.0 g/dL    Protein Total 6.8 6.8 - 8.8 g/dL    Alkaline Phosphatase 279 (H) 40 - 150 U/L    ALT 42 0 - 70 U/L     (H) 0 - 45 U/L   INR   Result Value Ref Range    INR 1.08 0.86 - 1.14   Partial thromboplastin time   Result Value Ref Range    PTT 32 22 - 37 sec   ABO/Rh type and screen   Result Value Ref Range    ABO A     RH(D) Neg     Antibody Screen Neg     Test Valid Only At [facility]        Specimen Expires 01/25/2021    EKG 12-lead, tracing only   Result Value Ref Range    Interpretation ECG Click View Image link to view waveform and result    Occult blood stool   Result Value Ref Range    Occult Blood Positive (A) NEG^Negative   Hemoglobin   Result Value Ref Range    Hemoglobin 8.1 (L) 13.3 - 17.7 g/dL   Asymptomatic SARS-CoV-2 COVID-19 Virus (Coronavirus) by PCR    Specimen: Nasopharyngeal   Result Value Ref Range    SARS-CoV-2 Virus Specimen Source Nasopharyngeal     SARS-CoV-2 PCR Result NEGATIVE     SARS-CoV-2 PCR Comment (Note)    Glucose by meter   Result Value Ref Range    Glucose 65 (L) 70 - 99 mg/dL   Paracentesis    Dedra Velasquez MD     1/22/2021  3:55 PM  [facility]    Procedure: Paracentesis    Date/Time: 1/22/2021 3:55 PM  Performed by: Dedra Cramer  MD Tasneem  Authorized by: Dedra Cramer MD     UNIVERSAL PROTOCOL   Site Marked: Yes  Prior Images Obtained and Reviewed:  Yes  Required items: Required blood products, implants, devices and special   equipment available    Patient identity confirmed:  Verbally with patient  Patient was reevaluated immediately before administering moderate or deep   sedation or anesthesia  Confirmation Checklist:  Patient's identity using two indicators,   procedure was appropriate and matched the consent or emergent situation,   correct equipment/implants were available and relevant allergies  Time out: Immediately prior to the procedure a time out was called    Universal Protocol: the Joint Commission Universal Protocol was followed    Preparation: Patient was prepped and draped in usual sterile fashion        PROCEDURE   Patient Tolerance:  Patient tolerated the procedure well with no immediate   complications    Length of time physician/provider present for 1:1 monitoring during   sedation: 0   US Paracentesis    Narrative    US PARACENTESIS 1/22/2021 4:08 PM     HISTORY: Recurrent ascites, cirrhosis symptomatic ascites    FINDINGS: Limited preprocedure ultrasound was performed, images show a  sufficient amount of ascites for paracentesis. An image was archived.  Written and oral informed consent was obtained. A pause for the cause  procedure to verify the correct patient and correct procedure. The  skin overlying the right lower quadrant was prepped and draped in the  usual sterile fashion. The subcutaneous tissues were anesthetized with  10 mL of 1 percent lidocaine injected. Under direct ultrasound  guidance the catheter was advanced into the peritoneal space and 3.2 L  of  straw colored fluid was drained. The catheter was removed and a  sterile dressing was applied. There were no immediate complications.  Ultrasound images were permanently stored.  Patient left the  ultrasound suite in satisfactory condition.       Impression    IMPRESSION: Technically successful paracentesis without immediate  complications.    ANGELA CARRANZA MD   INR   Result Value Ref Range    INR 1.12 0.86 - 1.14   Magnesium   Result Value Ref Range    Magnesium 1.6 1.6 - 2.3 mg/dL   Phosphorus   Result Value Ref Range    Phosphorus 3.2 2.5 - 4.5 mg/dL   INR   Result Value Ref Range    INR 1.15 (H) 0.86 - 1.14   Platelet count   Result Value Ref Range    Platelet Count 99 (L) 150 - 450 10e9/L

## 2021-01-23 NOTE — PROGRESS NOTES
Admission    Patient arrives to room via cart from ED  Care plan note: Pt came in with weakness and bloody stools. Possible alcohol withdrawal.    Inpatient nursing criteria listed below were met:    PCD's Documented: Yes  Skin issues/needs documented :Yes  Isolation education started/completed NA  Patient allergies verified with patient: Yes  Verified completion of Norman Risk Assessment Tool:  Yes  Verified completion of Guardianship screening tool: Yes  Fall Prevention: Care plan updated, Education given and documented Yes  Care Plan initiated: Yes  Home medications documented in belongings flowsheet: NA  Patient belongings documented in belongings flowsheet: Yes  Reminder note (belongings/ medications) placed in discharge instructions:Yes  Admission profile/ required documentation complete: Yes  Bedside Report Letter given and explained to patient Yes  Visitor Designated? No  If patient is a 72 hour hold/Commitment are belongings removed from room and locked up? NA

## 2021-01-23 NOTE — PROGRESS NOTES
[facility]    Hospitalist Progress Note    Assessment & Plan   65 year old male who was admitted on 1/22/2021 with Melena:    Impression:   Principal Problem:    GI bleed   -- EGD this AM, no active bleeding, stop Octreatide      Acute blood loss anemia      NGUYEN (acute kidney injury) -- suspect aggravated by Paracentesis of 3.2 liters 1/22/2021    -- IV 5% albumin today, check BMP in AM      Alcoholic hepatitis      Esophageal varices (EGD with 6 varices banded 4/27/18)   -- minimal varices on EGD today      Alcoholic cirrhosis of liver with ascites    -- Vodka 750 ml daily since New Years    -- needs to quit or he will die (discussed options -- doesn't feel he needs treatment, thinks he can quit on his own)      Alcohol withdrawal    -- moderate, continue CIWA protocol      Thrombocytopenia -- related to ETOH      Active Problems:    Anxiety and depression   -- increase Seroquel to 50 mg tid prn, has 100 mg at bedtime, and Trazodone 100 mg at bedtime         Essential hypertension -- currently borderline hypotension   -- hold parameters for SBP < 120 (hypotension contributing to NGUYEN)      COPD (chronic obstructive pulmonary disease)       Smoker      JANIS on CPAP -- needs to quit   -- has a patch on       Alcoholic cirrhosis of liver with ascites (H)      Plan:  Discussed options    DVT Prophylaxis: Pneumatic Compression Devices  Code Status: Full Code    Disposition: Expected discharge in 1-2 days    Gray Vincent MD  Pager [phone]  Cell Phone [phone]  Text Page (7am to 6pm)    Interval History   Feels OK currently, no abdominal pain.      Physical Exam   Temp: 98.6  F (37  C) Temp src: Oral BP: 100/51 Pulse: 59   Resp: 18 SpO2: 94 % O2 Device: Nasal cannula Oxygen Delivery: 1.5 LPM  Vitals:    01/22/21 1014 01/23/21 0601   Weight: 86.2 kg (190 lb) 75.4 kg (166 lb 3.6 oz)     Vital Signs with Ranges  Temp:  [97.8  F (36.6  C)-100.5  F (38.1  C)] 98.6  F (37  C)  Pulse:   [50-95] 59  Resp:  [16-20] 18  BP: ()/(48-86) 100/51  SpO2:  [88 %-98 %] 94 %  I/O last 3 completed shifts:  In: -   Out: 395 [Urine:395]    # Pain Assessment:  Current Pain Score 1/23/2021   Patient currently in pain? denies   Pain score (0-10) -   Pain location -   Pain descriptors -   rFLACC pain score -   Jim s pain level was assessed and he currently denies pain.        Constitutional: Awake, alert, cooperative, no apparent distress  Respiratory: Clear to auscultation bilaterally, no crackles or wheezing  Cardiovascular: Regular rate and rhythm, normal S1 and S2, and no murmur noted  GI: Normal bowel sounds, soft, moderate distension consistent with ascites, non-tender  Extrem: No calf tenderness, trace bilateral ankle edema  Neuro: Ox3, no focal motor or sensory deficits    Medications     - MEDICATION INSTRUCTIONS -       octreotide (sandoSTATIN) infusion ADULT 50 mcg/hr (01/22/21 1228)       albumin human  12.5 g Intravenous Once     atenolol  25 mg Oral Daily     cefTRIAXone  1 g Intravenous Q24H     cloNIDine  0.1 mg Oral Q8H     fluticasone-vilanterol  1 puff Inhalation Daily     folic acid  1 mg Oral Daily     mirtazapine  30 mg Oral At Bedtime     multivitamin w/minerals  1 tablet Oral Daily     nicotine  1 patch Transdermal Daily     nicotine   Transdermal Q8H     pantoprazole  40 mg Intravenous BID     QUEtiapine  100 mg Oral At Bedtime     sodium chloride (PF)  3 mL Intracatheter Q8H     tamsulosin  0.4 mg Oral Daily     thiamine  200 mg Oral TID    Followed by     [START ON 1/24/2021] thiamine  100 mg Oral TID    Followed by     [START ON 1/30/2021] thiamine  100 mg Oral Daily     traZODone  100 mg Oral At Bedtime     vortioxetine  10 mg Oral Daily       Data   Recent Labs   Lab 01/23/21  0557 01/22/21  2142 01/22/21  1729 01/22/21  1639 01/22/21  1235 01/22/21  1025   WBC 2.2*  --   --   --   --  4.9   HGB 7.5* 7.5*  --   --  8.1* 8.7*   MCV 98  --   --   --   --  96   PLT 65*  --  99*  --    --  131*   INR  --   --  1.15* 1.12  --  1.08     --   --   --   --  137   POTASSIUM 4.5  --   --   --   --  4.1   CHLORIDE 104  --   --   --   --  104   CO2 21  --   --   --   --  17*   BUN 31*  --   --   --   --  25   CR 2.20*  --   --   --   --  1.59*   ANIONGAP 11  --   --   --   --  16*   KEV 8.2*  --   --   --   --  8.3*   GLC 75  --   --   --   --  66*   ALBUMIN 2.4*  --   --   --   --  2.9*   PROTTOTAL 5.6*  --   --   --   --  6.8   BILITOTAL 1.6*  --   --   --   --  1.6*   ALKPHOS 208*  --   --   --   --  279*   ALT 35  --   --   --   --  42   AST 84*  --   --   --   --  105*       Imaging:   Recent Results (from the past 24 hour(s))   US Paracentesis    Narrative    US PARACENTESIS 1/22/2021 4:08 PM     HISTORY: Recurrent ascites, cirrhosis symptomatic ascites    FINDINGS: Limited preprocedure ultrasound was performed, images show a  sufficient amount of ascites for paracentesis. An image was archived.  Written and oral informed consent was obtained. A pause for the cause  procedure to verify the correct patient and correct procedure. The  skin overlying the right lower quadrant was prepped and draped in the  usual sterile fashion. The subcutaneous tissues were anesthetized with  10 mL of 1 percent lidocaine injected. Under direct ultrasound  guidance the catheter was advanced into the peritoneal space and 3.2 L  of  straw colored fluid was drained. The catheter was removed and a  sterile dressing was applied. There were no immediate complications.  Ultrasound images were permanently stored.  Patient left the  ultrasound suite in satisfactory condition.      Impression    IMPRESSION: Technically successful paracentesis without immediate  complications.    ANGELA CARRANZA MD

## 2021-01-23 NOTE — PLAN OF CARE
Summary: Pt came in with weakness and bloody stools.     DATE & TIME: 1/22/21 1694-1215  Cognitive Concerns/ Orientation : Alert x 4  BEHAVIOR & AGGRESSION TOOL COLOR: Green  CIWA SCORE: 13/9/1 - gave ativan x 2 and compazine x 1.  ABNL VS/O2: VSS  MOBILITY: Assist of 1, gaitbelt and walker.  PAIN MANAGMENT: Denies.  DIET: NPO except for ice chips and water with meds  BOWEL/BLADDER: Blood stools, none this shift. Pt reported no voiding recently. Bladder scan of 401ml, pt voided 70 ml. Provider paged, pt was straight cath, 325ml gotten out.   ABNL LAB/BG: Hgb 7.5, provider paged, continue to monitor, if Vitals dip or patient has bloody stool/emesis, alert provider. INR 1.15, Alk phos 279, .  DRAIN/DEVICES: PIV right arm infusing sandostatin 10ml/hr.  TELEMETRY RHYTHM: NA  SKIN: Bruising.   TESTS/PROCEDURES: Possible scope tomorrow?  D/C DAY/GOALS/PLACE: TBD  OTHER IMPORTANT INFO: CPAP ordered.

## 2021-01-23 NOTE — PLAN OF CARE
Summary: Pt came in with weakness and bloody stools.     DATE & TIME: 1/23/21 night shift  Cognitive Concerns/ Orientation : Alert/Oriented x 4; lethargic  BEHAVIOR & AGGRESSION TOOL COLOR: Green  CIWA SCORE:3/6.   ABNL VS/O2: Hypotensive 90s/40s to 100s/50s. 1L of oxygen overnight. Declined Cpap.   MOBILITY: Assist of 1, gaitbelt and walker. Feels dizzy and lightheaded. Generalized weakness.   PAIN MANAGMENT: Denies.  DIET: NPO except for ice chips and water with meds  BOWEL/BLADDER:Bladder scanned for 131 ml. No BM this shift.   ABNL LAB/BG: Hgb 7.5 this morning, pt is on IV octreotide. INR 1.15, Alk phos 279, .  DRAIN/DEVICES: PIV right arm infusing octreotide at 10ml/hr.  TELEMETRY RHYTHM: NA  SKIN: Bruising.   TESTS/PROCEDURES: Possibly EGD today, not ordered. GI is following  D/C DAY/GOALS/PLACE: Pending, once Hg is stable  OTHER IMPORTANT INFO: n/a

## 2021-01-23 NOTE — PROGRESS NOTES
Paged for HGB drop to 7.5; per nursing staff he is normotensive and without further melena thus far since arrival to the floor. We plan to continue to monitor serial HGB and closely monitor his clinical status overnight.

## 2021-01-23 NOTE — PROGRESS NOTES
MD Notification    Notified Person: MD Flores    Notified Person Name:    Notification Date/Time: 1/22/21 9088    Notification Interaction: webpage    Purpose of Notification: Pt hemoglobin is 7.5, dropped from 8.1. Let us know if you want any interventions implemented.     Orders Received: Provider said to monitor vitals and notify him if vitals become unstable or patient has more bloody stools.    Comments:

## 2021-01-24 LAB
ANION GAP SERPL CALCULATED.3IONS-SCNC: 6 MMOL/L (ref 3–14)
BUN SERPL-MCNC: 35 MG/DL (ref 7–30)
CALCIUM SERPL-MCNC: 8.2 MG/DL (ref 8.5–10.1)
CHLORIDE SERPL-SCNC: 102 MMOL/L (ref 94–109)
CO2 SERPL-SCNC: 25 MMOL/L (ref 20–32)
CREAT SERPL-MCNC: 2.25 MG/DL (ref 0.66–1.25)
ERYTHROCYTE [DISTWIDTH] IN BLOOD BY AUTOMATED COUNT: 15 % (ref 10–15)
GFR SERPL CREATININE-BSD FRML MDRD: 29 ML/MIN/{1.73_M2}
GLUCOSE SERPL-MCNC: 195 MG/DL (ref 70–99)
HCT VFR BLD AUTO: 25.2 % (ref 40–53)
HGB BLD-MCNC: 8 G/DL (ref 13.3–17.7)
MAGNESIUM SERPL-MCNC: 1.6 MG/DL (ref 1.6–2.3)
MCH RBC QN AUTO: 30.8 PG (ref 26.5–33)
MCHC RBC AUTO-ENTMCNC: 31.7 G/DL (ref 31.5–36.5)
MCV RBC AUTO: 97 FL (ref 78–100)
PLATELET # BLD AUTO: 70 10E9/L (ref 150–450)
POTASSIUM SERPL-SCNC: 4 MMOL/L (ref 3.4–5.3)
RBC # BLD AUTO: 2.6 10E12/L (ref 4.4–5.9)
SODIUM SERPL-SCNC: 133 MMOL/L (ref 133–144)
WBC # BLD AUTO: 2.9 10E9/L (ref 4–11)

## 2021-01-24 PROCEDURE — 36415 COLL VENOUS BLD VENIPUNCTURE: CPT | Performed by: INTERNAL MEDICINE

## 2021-01-24 PROCEDURE — 250N000013 HC RX MED GY IP 250 OP 250 PS 637: Performed by: INTERNAL MEDICINE

## 2021-01-24 PROCEDURE — 120N000001 HC R&B MED SURG/OB

## 2021-01-24 PROCEDURE — 83735 ASSAY OF MAGNESIUM: CPT | Performed by: INTERNAL MEDICINE

## 2021-01-24 PROCEDURE — 80048 BASIC METABOLIC PNL TOTAL CA: CPT | Performed by: INTERNAL MEDICINE

## 2021-01-24 PROCEDURE — 85027 COMPLETE CBC AUTOMATED: CPT | Performed by: INTERNAL MEDICINE

## 2021-01-24 PROCEDURE — 99232 SBSQ HOSP IP/OBS MODERATE 35: CPT | Performed by: INTERNAL MEDICINE

## 2021-01-24 RX ORDER — TAMSULOSIN HYDROCHLORIDE 0.4 MG/1
0.4 CAPSULE ORAL ONCE
Status: COMPLETED | OUTPATIENT
Start: 2021-01-24 | End: 2021-01-24

## 2021-01-24 RX ORDER — CALCIUM CARBONATE 500 MG/1
1000 TABLET, CHEWABLE ORAL EVERY 4 HOURS PRN
Status: DISCONTINUED | OUTPATIENT
Start: 2021-01-24 | End: 2021-01-27 | Stop reason: HOSPADM

## 2021-01-24 RX ORDER — TAMSULOSIN HYDROCHLORIDE 0.4 MG/1
0.8 CAPSULE ORAL DAILY
Status: DISCONTINUED | OUTPATIENT
Start: 2021-01-25 | End: 2021-01-27 | Stop reason: HOSPADM

## 2021-01-24 RX ADMIN — VORTIOXETINE 10 MG: 10 TABLET, FILM COATED ORAL at 06:53

## 2021-01-24 RX ADMIN — CLONIDINE HYDROCHLORIDE 0.1 MG: 0.1 TABLET ORAL at 21:48

## 2021-01-24 RX ADMIN — TRAZODONE HYDROCHLORIDE 100 MG: 100 TABLET ORAL at 21:48

## 2021-01-24 RX ADMIN — LORAZEPAM 1 MG: 1 TABLET ORAL at 08:59

## 2021-01-24 RX ADMIN — HYDROXYZINE HYDROCHLORIDE 50 MG: 25 TABLET, FILM COATED ORAL at 23:09

## 2021-01-24 RX ADMIN — NICOTINE 1 PATCH: 21 PATCH, EXTENDED RELEASE TRANSDERMAL at 08:27

## 2021-01-24 RX ADMIN — QUETIAPINE FUMARATE 100 MG: 100 TABLET ORAL at 21:48

## 2021-01-24 RX ADMIN — FLUTICASONE FUROATE AND VILANTEROL TRIFENATATE 1 PUFF: 200; 25 POWDER RESPIRATORY (INHALATION) at 08:33

## 2021-01-24 RX ADMIN — MULTIPLE VITAMINS W/ MINERALS TAB 1 TABLET: TAB at 08:27

## 2021-01-24 RX ADMIN — MIRTAZAPINE 30 MG: 15 TABLET, FILM COATED ORAL at 21:48

## 2021-01-24 RX ADMIN — CALCIUM CARBONATE (ANTACID) CHEW TAB 500 MG 1000 MG: 500 CHEW TAB at 19:21

## 2021-01-24 RX ADMIN — PANTOPRAZOLE SODIUM 40 MG: 40 TABLET, DELAYED RELEASE ORAL at 06:50

## 2021-01-24 RX ADMIN — QUETIAPINE 50 MG: 25 TABLET, FILM COATED ORAL at 14:50

## 2021-01-24 RX ADMIN — CLONIDINE HYDROCHLORIDE 0.1 MG: 0.1 TABLET ORAL at 08:27

## 2021-01-24 RX ADMIN — TAMSULOSIN HYDROCHLORIDE 0.4 MG: 0.4 CAPSULE ORAL at 08:27

## 2021-01-24 RX ADMIN — PANTOPRAZOLE SODIUM 40 MG: 40 TABLET, DELAYED RELEASE ORAL at 15:54

## 2021-01-24 RX ADMIN — THIAMINE HCL TAB 100 MG 100 MG: 100 TAB at 08:27

## 2021-01-24 RX ADMIN — QUETIAPINE 50 MG: 25 TABLET, FILM COATED ORAL at 06:53

## 2021-01-24 RX ADMIN — ATENOLOL 25 MG: 25 TABLET ORAL at 08:27

## 2021-01-24 RX ADMIN — FOLIC ACID 1 MG: 1 TABLET ORAL at 08:27

## 2021-01-24 RX ADMIN — TAMSULOSIN HYDROCHLORIDE 0.4 MG: 0.4 CAPSULE ORAL at 12:52

## 2021-01-24 ASSESSMENT — ACTIVITIES OF DAILY LIVING (ADL)
ADLS_ACUITY_SCORE: 13
ADLS_ACUITY_SCORE: 13
ADLS_ACUITY_SCORE: 14
ADLS_ACUITY_SCORE: 13

## 2021-01-24 ASSESSMENT — MIFFLIN-ST. JEOR: SCORE: 1539.63

## 2021-01-24 NOTE — PLAN OF CARE
Summary: Pt came in with weakness and bloody stools.     DATE & TIME: 1/23/21 8213-9170  Cognitive Concerns/ Orientation : Alert/Oriented x 4  BEHAVIOR & AGGRESSION TOOL COLOR: Green, calm/coop  CIWA SCORE:3  ABNL VS/O2: VSS, RA. 1L of oxygen overnight. Cpap at hs  MOBILITY: Assist of 1, gaitbelt and walker. Generalized weakness   PAIN MANAGMENT: Denies.  DIET: Reg, good po  BOWEL/BLADDER:voiding in BR, no BM tonightABNL LAB/BG: Hgb 7.5->7.8 Q8h INR 1.15, Alk phos 279, , cr1.59->2.2  DRAIN/DEVICES: PIV SL  TELEMETRY RHYTHM: NA  SKIN: Bruising.   TESTS/PROCEDURES: EGD neg today. Octriotide drip stopped, started on Protonix  D/C DAY/GOALS/PLACE: Pending GI plan and poss CD eval  OTHER IMPORTANT INFO: norma patch on R deltoid, GI following.

## 2021-01-24 NOTE — PLAN OF CARE
Summary: Pt came in with weakness and bloody stools.     DATE & TIME: 1/24/21 night shift  Cognitive Concerns/ Orientation : Alert/Oriented x 4; lethargic  BEHAVIOR & AGGRESSION TOOL COLOR: Green, calm and cooperate; reports anxiety. Atarax x 1 given.   CIWA SCORE:4/2  ABNL VS/O2: VSS on RA, on 1 L of oxygen overnight. Tried Cpap at the beginning shift, but was keep taking his mask off, preferred NC .   MOBILITY: Assist of 1, gait belt and walker. Feels dizzy and lightheaded. Generalized weakness.   PAIN MANAGMENT: Denies.  DIET: Regular  BOWEL/BLADDER:Voiding per urinal. No BM this shift.   ABNL LAB/BG: Hgb 7.8; Cr 2.2; Alk phos 208, AST 84. Bili total 1.6. WBC 2.2. Platelets 65  DRAIN/DEVICES: PIV SL  TELEMETRY RHYTHM: NA  SKIN: Bruising.   TESTS/PROCEDURES: n/a  D/C DAY/GOALS/PLACE: Pending GI plan and poss CD eval. Octriotide drip stopped, started on Protonix  OTHER IMPORTANT INFO: n/a

## 2021-01-24 NOTE — PLAN OF CARE
Summary: Pt came in with weakness and bloody stools.     DATE & TIME: 1/24/21 5913-9953  Cognitive Concerns/ Orientation : Alert/Oriented x 4  BEHAVIOR & AGGRESSION TOOL COLOR: Green, calm and cooperate; reports anxiety.   CIWA SCORE:2/8 Atarax x 1 given.   ABNL VS/O2: VSS on RA  MOBILITY: Assist of 1, gait belt and walker. Generalized weakness. ambX2 in the halls.up in the chair for meals  PAIN MANAGMENT: generalized pan, declines interventions   DIET: Regular  BOWEL/BLADDER:Voiding some per urinal. CathedX1 for 300, Flomax dose increased. BM this shift, normal  ABNL LAB/BG: Hgb 7.8->8; Cr 2.2->2.25 DRAIN/DEVICES: PIV SL  TELEMETRY RHYTHM: NA  SKIN: Bruising.   TESTS/PROCEDURES: n/a  D/C DAY/GOALS/PLACE: 1-2 days, states that has sober house plans lined up  OTHER IMPORTANT INFO: prn Seroquel givenX1

## 2021-01-24 NOTE — PLAN OF CARE
Summary: Pt came in with weakness and bloody stools.     DATE & TIME: 1/23/21 0315-9778  Cognitive Concerns/ Orientation : Alert/Oriented x 4  BEHAVIOR & AGGRESSION TOOL COLOR: Green  CIWA SCORE:4/2.   ABNL VS/O2: VSS, RA. 1L of oxygen overnight. Declined Cpap.   MOBILITY: Assist of 1, gaitbelt and walker. Feels dizzy and lightheaded. Generalized weakness.amb in the hallls   PAIN MANAGMENT: Denies.  DIET: tolerating regular  BOWEL/BLADDER:Bladder scanned for 256, able to void small amount. No BM this shift, mirlax given  ABNL LAB/BG: Hgb 7.5->7.8 Q8h INR 1.15, Alk phos 279, , cr1.59->2.2  DRAIN/DEVICES: PIV SL  TELEMETRY RHYTHM: NA  SKIN: Bruising.   TESTS/PROCEDURES: EGD neg today. Octriotide drip stopped   D/C DAY/GOALS/PLACE: Pending GI plan and poss CD eval  OTHER IMPORTANT INFO: norma patch on R deltoid

## 2021-01-24 NOTE — PROGRESS NOTES
[facility]    Hospitalist Progress Note    Assessment & Plan   65 year old male who was admitted on 1/22/2021 with Melena:    Impression:   Principal Problem:    GI bleed   -- EGD this AM, no active bleeding, stop Octreatide      Acute blood loss anemia -- appears stable   -- CBC in AM      NGUYEN (acute kidney injury) -- suspect aggravated by Paracentesis of 3.2 liters 1/22/2021    -- IV 5% albumin yesterday    -- BMP in AM       Alcoholic hepatitis      Esophageal varices (EGD with 6 varices banded 4/27/18)   -- minimal varices on EGD 1/23, continue PPI and avoid alcohol       Alcoholic cirrhosis of liver with ascites    -- Vodka 750 ml daily since New Years    -- needs to quit or he will die (discussed options -- doesn't feel he needs treatment, thinks he can quit on his own)   -- he is going to [facility] when discharged from here (he arranged it)       Alcohol withdrawal    -- moderate, continue CIWA protocol      Thrombocytopenia -- related to ETOH      BPH with urinary retention    -- was straight cathed twice for  ml   -- increase Flomax to 0.8 mg daily, and increase straight cath parameter to > 400 ml      Active Problems:    Anxiety and depression   -- increased Seroquel to 50 mg tid prn, has 100 mg at bedtime, and Trazodone 100 mg at bedtime         Essential hypertension -- currently borderline hypotension   -- hold parameters for SBP < 120 (hypotension contributing to NGUYEN)      COPD (chronic obstructive pulmonary disease)       Smoker      JANIS on CPAP -- needs to quit   -- has a patch on       Alcoholic cirrhosis of liver with ascites (H)      Plan:  Discussed options    DVT Prophylaxis: Pneumatic Compression Devices  Code Status: Full Code    Disposition: Expected discharge in 1-2 days (once creatinine improves)  Gray Vincent MD  Pager [phone]  Cell Phone [phone]  Text Page (7am to 6pm)    Interval History   Feels OK  currently, no abdominal pain.  Needed straight cath x 2.     Physical Exam   Temp: 97.2  F (36.2  C) Temp src: Oral BP: 108/59 Pulse: 73   Resp: 18 SpO2: 91 % O2 Device: Nasal cannula Oxygen Delivery: 2 LPM  Vitals:    01/22/21 1014 01/23/21 0601 01/24/21 0548   Weight: 86.2 kg (190 lb) 75.4 kg (166 lb 3.6 oz) 80.1 kg (176 lb 9.4 oz)     Vital Signs with Ranges  Temp:  [97.2  F (36.2  C)-98.6  F (37  C)] 97.2  F (36.2  C)  Pulse:  [56-73] 73  Resp:  [12-18] 18  BP: (108-147)/(59-73) 108/59  SpO2:  [91 %-100 %] 91 %  I/O last 3 completed shifts:  In: 240 [P.O.:240]  Out: 750 [Urine:750]    # Pain Assessment:  Current Pain Score 1/23/2021   Patient currently in pain? denies   Pain score (0-10) -   Pain location -   Pain descriptors -   rFLACC pain score -   Jim s pain level was assessed and he currently denies pain.        Constitutional: Awake, alert, cooperative, no apparent distress  Respiratory: Clear to auscultation bilaterally, no crackles or wheezing  Cardiovascular: Regular rate and rhythm, normal S1 and S2, and no murmur noted  GI: Normal bowel sounds, soft, moderate distension consistent with ascites, non-tender  Extrem: No calf tenderness, trace bilateral ankle edema  Neuro: Ox3, no focal motor or sensory deficits    Medications     - MEDICATION INSTRUCTIONS -         atenolol  25 mg Oral Daily     cloNIDine  0.1 mg Oral BID     fluticasone-vilanterol  1 puff Inhalation Daily     folic acid  1 mg Oral Daily     mirtazapine  30 mg Oral At Bedtime     multivitamin w/minerals  1 tablet Oral Daily     nicotine  1 patch Transdermal Daily     nicotine   Transdermal Q8H     pantoprazole  40 mg Oral BID AC     QUEtiapine  100 mg Oral At Bedtime     sodium chloride (PF)  3 mL Intracatheter Q8H     tamsulosin  0.4 mg Oral Daily     vitamin B1  100 mg Oral Daily     traZODone  100 mg Oral At Bedtime     vortioxetine  10 mg Oral Daily       Data   Recent Labs   Lab 01/24/21  1134 01/23/21  1401 01/23/21  0557  01/22/21  1729 01/22/21  1729 01/22/21  1639 01/22/21  1025 01/22/21  1025   WBC 2.9*  --  2.2*  --   --   --   --  4.9   HGB 8.0* 7.8* 7.5*   < >  --   --    < > 8.7*   MCV 97  --  98  --   --   --   --  96   PLT 70*  --  65*  --  99*  --   --  131*   INR  --   --   --   --  1.15* 1.12  --  1.08     --  136  --   --   --   --  137   POTASSIUM 4.0  --  4.5  --   --   --   --  4.1   CHLORIDE 102  --  104  --   --   --   --  104   CO2 25  --  21  --   --   --   --  17*   BUN 35*  --  31*  --   --   --   --  25   CR 2.25*  --  2.20*  --   --   --   --  1.59*   ANIONGAP 6  --  11  --   --   --   --  16*   KEV 8.2*  --  8.2*  --   --   --   --  8.3*   *  --  75  --   --   --   --  66*   ALBUMIN  --   --  2.4*  --   --   --   --  2.9*   PROTTOTAL  --   --  5.6*  --   --   --   --  6.8   BILITOTAL  --   --  1.6*  --   --   --   --  1.6*   ALKPHOS  --   --  208*  --   --   --   --  279*   ALT  --   --  35  --   --   --   --  42   AST  --   --  84*  --   --   --   --  105*    < > = values in this interval not displayed.       Imaging:   No results found for this or any previous visit (from the past 24 hour(s)).

## 2021-01-25 ENCOUNTER — APPOINTMENT (OUTPATIENT)
Dept: PHYSICAL THERAPY | Facility: CLINIC | Age: 66
DRG: 378 | End: 2021-01-25
Attending: HOSPITALIST
Payer: MEDICARE

## 2021-01-25 LAB
ANION GAP SERPL CALCULATED.3IONS-SCNC: 7 MMOL/L (ref 3–14)
AST SERPL W P-5'-P-CCNC: 56 U/L (ref 0–45)
BUN SERPL-MCNC: 30 MG/DL (ref 7–30)
CALCIUM SERPL-MCNC: 8.2 MG/DL (ref 8.5–10.1)
CHLORIDE SERPL-SCNC: 103 MMOL/L (ref 94–109)
CO2 SERPL-SCNC: 23 MMOL/L (ref 20–32)
CREAT SERPL-MCNC: 2.04 MG/DL (ref 0.66–1.25)
ERYTHROCYTE [DISTWIDTH] IN BLOOD BY AUTOMATED COUNT: 15.1 % (ref 10–15)
GFR SERPL CREATININE-BSD FRML MDRD: 33 ML/MIN/{1.73_M2}
GLUCOSE BLDC GLUCOMTR-MCNC: 138 MG/DL (ref 70–99)
GLUCOSE SERPL-MCNC: 132 MG/DL (ref 70–99)
HCT VFR BLD AUTO: 26.9 % (ref 40–53)
HGB BLD-MCNC: 8.7 G/DL (ref 13.3–17.7)
MCH RBC QN AUTO: 31.1 PG (ref 26.5–33)
MCHC RBC AUTO-ENTMCNC: 32.3 G/DL (ref 31.5–36.5)
MCV RBC AUTO: 96 FL (ref 78–100)
PLATELET # BLD AUTO: 76 10E9/L (ref 150–450)
POTASSIUM SERPL-SCNC: 3.6 MMOL/L (ref 3.4–5.3)
RBC # BLD AUTO: 2.8 10E12/L (ref 4.4–5.9)
SODIUM SERPL-SCNC: 133 MMOL/L (ref 133–144)
WBC # BLD AUTO: 3.5 10E9/L (ref 4–11)

## 2021-01-25 PROCEDURE — 36415 COLL VENOUS BLD VENIPUNCTURE: CPT | Performed by: INTERNAL MEDICINE

## 2021-01-25 PROCEDURE — 999N001017 HC STATISTIC GLUCOSE BY METER IP

## 2021-01-25 PROCEDURE — 250N000013 HC RX MED GY IP 250 OP 250 PS 637: Performed by: INTERNAL MEDICINE

## 2021-01-25 PROCEDURE — 84450 TRANSFERASE (AST) (SGOT): CPT | Performed by: INTERNAL MEDICINE

## 2021-01-25 PROCEDURE — 250N000009 HC RX 250: Performed by: HOSPITALIST

## 2021-01-25 PROCEDURE — 99232 SBSQ HOSP IP/OBS MODERATE 35: CPT | Performed by: HOSPITALIST

## 2021-01-25 PROCEDURE — 97116 GAIT TRAINING THERAPY: CPT | Mod: GP

## 2021-01-25 PROCEDURE — 85027 COMPLETE CBC AUTOMATED: CPT | Performed by: INTERNAL MEDICINE

## 2021-01-25 PROCEDURE — 97530 THERAPEUTIC ACTIVITIES: CPT | Mod: GP

## 2021-01-25 PROCEDURE — 250N000013 HC RX MED GY IP 250 OP 250 PS 637: Performed by: PHYSICIAN ASSISTANT

## 2021-01-25 PROCEDURE — 97161 PT EVAL LOW COMPLEX 20 MIN: CPT | Mod: GP

## 2021-01-25 PROCEDURE — 80048 BASIC METABOLIC PNL TOTAL CA: CPT | Performed by: INTERNAL MEDICINE

## 2021-01-25 PROCEDURE — 120N000001 HC R&B MED SURG/OB

## 2021-01-25 RX ORDER — LIDOCAINE HYDROCHLORIDE 20 MG/ML
JELLY TOPICAL ONCE
Status: COMPLETED | OUTPATIENT
Start: 2021-01-25 | End: 2021-01-25

## 2021-01-25 RX ADMIN — PANTOPRAZOLE SODIUM 40 MG: 40 TABLET, DELAYED RELEASE ORAL at 17:11

## 2021-01-25 RX ADMIN — MIRTAZAPINE 30 MG: 15 TABLET, FILM COATED ORAL at 22:21

## 2021-01-25 RX ADMIN — TRAZODONE HYDROCHLORIDE 100 MG: 100 TABLET ORAL at 22:21

## 2021-01-25 RX ADMIN — FLUTICASONE FUROATE AND VILANTEROL TRIFENATATE 1 PUFF: 200; 25 POWDER RESPIRATORY (INHALATION) at 08:45

## 2021-01-25 RX ADMIN — CLONIDINE HYDROCHLORIDE 0.1 MG: 0.1 TABLET ORAL at 19:48

## 2021-01-25 RX ADMIN — NICOTINE 1 PATCH: 21 PATCH, EXTENDED RELEASE TRANSDERMAL at 08:46

## 2021-01-25 RX ADMIN — VORTIOXETINE 10 MG: 10 TABLET, FILM COATED ORAL at 08:45

## 2021-01-25 RX ADMIN — THIAMINE HCL TAB 100 MG 100 MG: 100 TAB at 08:46

## 2021-01-25 RX ADMIN — FOLIC ACID 1 MG: 1 TABLET ORAL at 08:45

## 2021-01-25 RX ADMIN — QUETIAPINE 50 MG: 25 TABLET, FILM COATED ORAL at 17:12

## 2021-01-25 RX ADMIN — TAMSULOSIN HYDROCHLORIDE 0.8 MG: 0.4 CAPSULE ORAL at 08:45

## 2021-01-25 RX ADMIN — CARBIDOPA AND LEVODOPA 7.5 MG: 50; 200 TABLET, EXTENDED RELEASE ORAL at 12:45

## 2021-01-25 RX ADMIN — MULTIPLE VITAMINS W/ MINERALS TAB 1 TABLET: TAB at 08:45

## 2021-01-25 RX ADMIN — QUETIAPINE 50 MG: 25 TABLET, FILM COATED ORAL at 10:21

## 2021-01-25 RX ADMIN — LIDOCAINE HYDROCHLORIDE: 20 JELLY TOPICAL at 19:48

## 2021-01-25 RX ADMIN — PANTOPRAZOLE SODIUM 40 MG: 40 TABLET, DELAYED RELEASE ORAL at 06:31

## 2021-01-25 RX ADMIN — QUETIAPINE FUMARATE 100 MG: 100 TABLET ORAL at 22:59

## 2021-01-25 ASSESSMENT — MIFFLIN-ST. JEOR: SCORE: 1548.63

## 2021-01-25 ASSESSMENT — ACTIVITIES OF DAILY LIVING (ADL)
ADLS_ACUITY_SCORE: 13
ADLS_ACUITY_SCORE: 11
ADLS_ACUITY_SCORE: 14
ADLS_ACUITY_SCORE: 11

## 2021-01-25 NOTE — CONSULTS
Care Management Initial Consult    General Information  Assessment completed with: Patient, Care Team Member,  Omayra Phelpslondon ([phone])  Type of CM/SW Visit: Initial Assessment    Primary Care Provider verified and updated as needed: Yes   Readmission within the last 30 days: no previous admission in last 30 days      Reason for Consult: care coordination/care conference, substance use concerns  Advance Care Planning: Advance Care Planning Reviewed: no concerns identified          Communication Assessment  Patient's communication style: spoken language (English or Bilingual)    Hearing Difficulty or Deaf: no   Wear Glasses or Blind: no    Cognitive  Cognitive/Neuro/Behavioral: WDL  Level of Consciousness: alert  Arousal Level: opens eyes spontaneously  Orientation: disoriented to, time  Mood/Behavior: calm, cooperative  Best Language: 0 - No aphasia  Speech: clear, spontaneous, logical    Living Environment:   People in home: other (see comments)(Room-mates)     Current living Arrangements: house      Able to return to prior arrangements: yes       Family/Social Support:  Care provided by: self  Provides care for:    Marital Status: Single  Other (specify)(Family)          Description of Support System: Supportive    Support Assessment: Adequate family and caregiver support    Current Resources:   Skilled Home Care Services:    Community Resources:    Equipment currently used at home: none  Supplies currently used at home:      Employment/Financial:  Employment Status: disabled        Financial Concerns: No concerns identified                   Socioeconomic History     Marital status: Single     Spouse name: Not on file     Number of children: 2     Years of education: Not on file     Highest education level: Not on file   Occupational History     Occupation: Printer, on disability     Tobacco Use     Smoking status: Current Every Day Smoker     Packs/day: 1.00     Types: Cigarettes     Smokeless tobacco: Never  Used     Tobacco comment: Chantix caused nightmares   Substance and Sexual Activity     Alcohol use: Yes     Comment: reports he drinks .75 of vodka every 2 days     Drug use: No     Sexual activity: Not Currently       Functional Status:  Prior to admission patient needed assistance:   Dependent ADLs:: Independent          Mental Health Status:  Mental Health Status: No Current Concerns       Chemical Dependency Status:  Chemical Dependency Status: Current Concern  Chemical Dependency Management: Other (see comment)(outpt rx)          Values/Beliefs:  Spiritual, Cultural Beliefs, [employer]an Practices, Values that affect care: no               Additional Information:  Patient stating he lives with Roommate  in a town house and has been independent. Patient stating he gets paracentesis every 2 weeks and was consistent with calling  weekly. Patient admits to consuming alcohol this month was perturbed with the tarry stools that he was having and decided to come into the hospital.  Writer spoke with darcy Cutler ([phone])  who stated patient has been through numerous treatments in the past and was successful for last couple month. She would prefer patient has been approved and will be starting an outpatient treatment program with [facility] at discharge from hospital. Please notify her office when patient is discharged.    Renetta Griffin RN  Inpatient Care Coordinator  [facility]  #[phone]

## 2021-01-25 NOTE — PROGRESS NOTES
01/25/21 0930   Quick Adds   Type of Visit Initial PT Evaluation   Living Environment   People in home other (see comments)  (Roommate)   Home Accessibility stairs to enter home;stairs within home   Living Environment Comments Pt reports he needs to negotiate 6 + 6 stairs, has railing. Pt not very forth coming with information regarding house set up. States someone will be home with him at DC    Self-Care   Usual Activity Tolerance good   Current Activity Tolerance fair   Equipment Currently Used at Home none   Activity/Exercise/Self-Care Comment Pt IND at baseline, does have walker at home he can use if needed.    Disability/Function   Fall history within last six months yes  (reports fall in BR )   Number of times patient has fallen within last six months 1   Change in Functional Status Since Onset of Current Illness/Injury yes   General Information   Onset of Illness/Injury or Date of Surgery 01/22/21   Referring Physician Gissel Ralph, DO   Pertinent History of Current Problem (include personal factors and/or comorbidities that impact the POC) Pt admitted melena, history of ETOH abuse see chart for details   Existing Precautions/Restrictions fall   Cognition   Orientation Status (Cognition) oriented x 3   Cognitive Status Comments Pt forgetful at times    Pain Assessment   Patient Currently in Pain No   Posture    Posture Forward head position   Range of Motion (ROM)   ROM Comment BLE WFL   Strength   Strength Comments Pt demonstrates > 3/5 strength in BLE    Bed Mobility   Comment (Bed Mobility) Not formally assesed. Based on OOB mobility predict pt IND with bed mobility    Transfers   Transfer Safety Comments STS SBA BUE push off with use of walker    Gait/Stairs (Locomotion)   Comment (Gait/Stairs) Pt ambulated 5' for evaluation with BUE support on FWW, WBOS, slow cathy, downward gaze    Balance   Balance Comments Good dynamic balance with use of UE support on FWW    Clinical Impression    Criteria for Skilled Therapeutic Intervention yes, treatment indicated   PT Diagnosis (PT) impaired IND with functional mobility from baseline   Influenced by the following impairments impaired high level balance, functional weakness, impaired activity tolerance   Functional limitations due to impairments impaired IND with functional mobility form baseline   Clinical Presentation Stable/Uncomplicated   Clinical Presentation Rationale clinical judgement, mobilizing close to baseline   Clinical Decision Making (Complexity) low complexity   Therapy Frequency (PT) Daily   Predicted Duration of Therapy Intervention (days/wks) 3 days   Planned Therapy Interventions (PT) balance training;gait training;home exercise program;neuromuscular re-education;stair training;strengthening;transfer training   Risk & Benefits of therapy have been explained evaluation/treatment results reviewed;care plan/treatment goals reviewed;risks/benefits reviewed;current/potential barriers reviewed;participants voiced agreement with care plan;participants included;patient   PT Discharge Planning    PT Discharge Recommendation (DC Rec) home with assist   PT Rationale for DC Rec Predict with further medical management and PT, pt will return to baseline prior to DC. Pt does have FWW at home. Will continue to update recs    Total Evaluation Time   Total Evaluation Time (Minutes) 10

## 2021-01-25 NOTE — PLAN OF CARE
Summary: Pt came in with weakness and bloody stools.     DATE & TIME: 1/24/21 9909-5301  Cognitive Concerns/ Orientation : Alert/Oriented x 4  BEHAVIOR & AGGRESSION TOOL COLOR: Green, calm and cooperate; anxious at times  CIWA SCORE:4  ABNL VS/O2: VSS on RA  MOBILITY: SBA, gait belt and walker. Generalized weakness  PAIN MANAGMENT: generalized pan, declines interventions   DIET: Regular, fair po  BOWEL/BLADDER:Voiding some per urinal.  BM x 2 this shift  ABNL LAB/BG: Hgb 7.8->8; Cr 2.2->2.25 DRAIN/DEVICES: PIV SL  TELEMETRY RHYTHM: NA  SKIN: Bruising.   TESTS/PROCEDURES: n/a  D/C DAY/GOALS/PLACE: 1-2 days, states that has sober house plans lined up  OTHER IMPORTANT INFO: prn Atarax given  at hs, Bladder scan x1 for PVR of 106cc, declined CPAP overnight. GI following.

## 2021-01-25 NOTE — PROGRESS NOTES
[facility]  Gastroenterology Progress Note     Jim Richard MRN# [id number]   YOB: 1955 Age: 65 year old          Assessment and Plan:   Jim Richard is a 65 year old male who was admitted on 1/22/2021. I was asked to see the patient for melena w/ possible GI bleeding.    Alcoholic liver cirrhosis with portal hypertension  Chronic anemia  Ascites  Hemoglobin stable in 7-8  1/23/2021 EGD noted grade I esophageal varices, portal hypertension gastropathy. no evidence of GI bleed. Anemia thought to be multifactorial from portal hypertension and CKD.  Patient unable to tolerate diuretics. Had paracentesis for ascites.3.2 L removed.  Can repeat prn.  -he may be a candidate for TIPs given persistent ascites, no HE and prior normal ejection fraction, however he is still constantly drinking and likely would not qualify  Would recommend chemical dependency consult  Creatinine climbing concern for hepatorenal syndrome.  -- Continue with BID pantoprazole  -- Daily labs  -- Regular diet  -- Start midodrine 7.5 mg TID          Interval History:   no new complaints, doing well, denies chest pain, denies shortness of breath, denies abdominal pain, up and ambulating, alert, oriented to person, place and time and doing well; no cp, sob, n/v/d, or abd pain.              Review of Systems:   C: NEGATIVE for fever, chills, change in weight  E/M: NEGATIVE for ear, mouth and throat problems  R: NEGATIVE for significant cough or SOB  CV: NEGATIVE for chest pain, palpitations or peripheral edema             Medications:   I have reviewed this patient's current medications    atenolol  25 mg Oral Daily     cloNIDine  0.1 mg Oral BID     fluticasone-vilanterol  1 puff Inhalation Daily     folic acid  1 mg Oral Daily     mirtazapine  30 mg Oral At Bedtime     multivitamin w/minerals  1 tablet Oral Daily     nicotine  1 patch Transdermal Daily     nicotine   Transdermal Q8H     pantoprazole   40 mg Oral BID AC     QUEtiapine  100 mg Oral At Bedtime     sodium chloride (PF)  3 mL Intracatheter Q8H     tamsulosin  0.8 mg Oral Daily     traZODone  100 mg Oral At Bedtime     vortioxetine  10 mg Oral Daily                  Physical Exam:   Vitals were reviewed  Vital Signs with Ranges  Temp:  [98.2  F (36.8  C)-98.4  F (36.9  C)] 98.4  F (36.9  C)  Pulse:  [57-64] 57  Resp:  [18] 18  BP: (108-128)/(63-66) 108/63  SpO2:  [94 %-96 %] 94 %  I/O last 3 completed shifts:  In: 620 [P.O.:620]  Out: 450 [Urine:450]  Constitutional: healthy, alert and no distress   Cardiovascular: negative, PMI normal. No lifts, heaves, or thrills. RRR. No murmurs, clicks gallops or rub  Respiratory: negative, Percussion normal. Good diaphragmatic excursion. Lungs clear  Head: Normocephalic. No masses, lesions, tenderness or abnormalities  Neck: Neck supple. No adenopathy. Thyroid symmetric, normal size,, Carotids without bruits.  Abdomen: Abdomen soft, non-tender. BS normal. No masses, organomegaly           Data:   I reviewed the patient's new clinical lab test results.   Recent Labs   Lab Test 01/24/21  1134 01/23/21  1401 01/23/21  0557 01/22/21  1729 01/22/21  1729 01/22/21  1639 01/22/21  1025 01/22/21  1025   WBC 2.9*  --  2.2*  --   --   --   --  4.9   HGB 8.0* 7.8* 7.5*   < >  --   --    < > 8.7*   MCV 97  --  98  --   --   --   --  96   PLT 70*  --  65*  --  99*  --   --  131*   INR  --   --   --   --  1.15* 1.12  --  1.08    < > = values in this interval not displayed.     Recent Labs   Lab Test 01/24/21  1134 01/23/21  0557 01/22/21  1025   POTASSIUM 4.0 4.5 4.1   CHLORIDE 102 104 104   CO2 25 21 17*   BUN 35* 31* 25   ANIONGAP 6 11 16*     Recent Labs   Lab Test 01/23/21  0557 01/22/21  1025 11/03/20  1021 10/29/20  0938 10/14/20  1530 10/14/20  1530 10/08/20  2340 10/08/20  2340 10/06/20  1610 10/06/20  1610 10/05/20  1901 10/05/20  1901 05/26/20  1612 05/26/20  1612 01/11/20  1730 01/11/20  1730   ALBUMIN 2.4* 2.9* 2.8*  2.8*   < > 3.0*   < >  --    < >  --    < > 3.3*   < >  --    < >  --    BILITOTAL 1.6* 1.6*  --  0.3   < > 0.6  --   --   --   --    < > 0.9   < >  --    < >  --    ALT 35 42  --  20   < > 20  --   --   --   --    < > 22   < >  --    < >  --    AST 84* 105*  --  22   < > 36  --   --   --   --    < > 39   < >  --    < >  --    PROTEIN  --   --   --   --   --   --   --   --   --  10*  --   --   --  30*  --  Negative   LIPASE  --   --   --   --   --  236  --  298  --   --   --  348  --   --    < >  --     < > = values in this interval not displayed.       I reviewed the patient's new imaging results.    All laboratory data reviewed  All imaging studies reviewed by me.    Gloria De Leon PA-C,  1/25/2021  [facility]  Office : [phone]  Cell: [phone] (Dr. Shaw)  Cell: [phone] (Gloria De Leon PA-C)

## 2021-01-25 NOTE — PROVIDER NOTIFICATION
MD Notification    Notified Person: MD    Notified Person Name: Dr. Ralph    Notification Date/Time: 1/25/2021 at 1738    Notification Interaction: Web paged with callback    Purpose of Notification: Request direction regarding BP being 140/78 and 147/78 and  Midodrine administration and loss of IV access.     Orders Received: Parameters placed in MAR for Midodrine and place new IV.  Nancie Duran RN      Comments:

## 2021-01-25 NOTE — PROGRESS NOTES
[facility]    Hospitalist Progress Note      Assessment & Plan   Jim Richard is a 65 year old male who was admitted on 1/22/2021 with melena with possible GI bleed. Underwent EGD without signs of active bleed. Hospitalization complicated by NGUYEN, urinary retention and alcohol withdrawal.    GI bleed  Acute blood loss anemia -- appears stable. Anemia multifactorial with CKD, portal hypertensive gastropathy and esophagitis with ongoing alcohol abuse  - EGD 1/23 without active bleed. Did have grade C esophagitis.  - CBC     NGUYEN (acute kidney injury) -- suspect aggravated by Paracentesis of 3.2 liters 1/22/2021   -- IV 5% albumin 1/23  --   -- BMP in AM      Alcoholic hepatitis   Esophageal varices (EGD with 6 varices banded 4/27/18)  Alcoholic cirrhosis of liver with ascites   -- minimal varices on EGD 1/23, continue PPI and avoid alcohol   -- started midodrine 7.5mg TID on 1/25     Alcohol withdrawal   * moderate, continue CIWA protocol  -- Vodka 750 ml daily since New Years   -- needs to quit or he will die (discussed options -- doesn't feel he needs treatment, thinks he can quit on his own)  -- he is going to [facility] when discharged from here (he arranged it)      Thrombocytopenia -- related to ETOH       BPH with urinary retention    -- was straight cathed twice for  ml   -- Flomax to 0.8 mg daily, and increase straight cath parameter to > 400 ml     Anxiety and depression  -- increased Seroquel to 50 mg tid prn, has 100 mg at bedtime, and Trazodone 100 mg at bedtime       Essential hypertension -- currently borderline hypotension  -- hold parameters for SBP < 120 (hypotension contributing to NGUYEN)     COPD (chronic obstructive pulmonary disease)   Smoker  -- needs to quit  -- has a patch on      JANIS on CPAP      DVT Prophylaxis: Pneumatic Compression Devices  Code Status: Full Code  Expected discharge: 2 - 3 days, recommended to outpatient  rehab once renal function improved, no longer dizzy    Gissel Ramo, DO  Text Page (7am - 6pm)    Interval History   Patient seen and examined. No chest pain, shortness of breath, nausea, vomiting. Tolerating diet. Has some dizziness when he gets up. Discussed addition of midodrine--may help his dizziness. Still has generalized weakness.    -Data reviewed today: I reviewed all new labs and imaging results over the last 24 hours. I personally reviewed no images or EKG's today.    Physical Exam   Temp: 98  F (36.7  C) Temp src: Oral BP: 118/66 Pulse: 63   Resp: 18 SpO2: 97 % O2 Device: None (Room air)    Vitals:    01/23/21 0601 01/24/21 0548 01/25/21 0500   Weight: 75.4 kg (166 lb 3.6 oz) 80.1 kg (176 lb 9.4 oz) 81 kg (178 lb 9.2 oz)     Vital Signs with Ranges  Temp:  [98  F (36.7  C)-98.4  F (36.9  C)] 98  F (36.7  C)  Pulse:  [57-64] 63  Resp:  [18] 18  BP: (108-128)/(63-66) 118/66  SpO2:  [94 %-97 %] 97 %  I/O last 3 completed shifts:  In: 620 [P.O.:620]  Out: 450 [Urine:450]    Constitutional: Awake, alert, cooperative, no apparent distress  Respiratory: Clear to auscultation bilaterally, no crackles or wheezing  Cardiovascular: Regular rate and rhythm, normal S1 and S2, and no murmur noted  GI: Normal bowel sounds, soft, moderate distention with ascites, non-tender  Skin/Integumen: No rashes, no cyanosis, trace bilateral ankle edema  Other:     Medications     - MEDICATION INSTRUCTIONS -         atenolol  25 mg Oral Daily     cloNIDine  0.1 mg Oral BID     fluticasone-vilanterol  1 puff Inhalation Daily     folic acid  1 mg Oral Daily     midodrine  7.5 mg Oral TID w/meals     mirtazapine  30 mg Oral At Bedtime     multivitamin w/minerals  1 tablet Oral Daily     nicotine  1 patch Transdermal Daily     nicotine   Transdermal Q8H     pantoprazole  40 mg Oral BID AC     QUEtiapine  100 mg Oral At Bedtime     sodium chloride (PF)  3 mL Intracatheter Q8H     tamsulosin  0.8 mg Oral Daily     traZODone  100 mg  Oral At Bedtime     vortioxetine  10 mg Oral Daily       Data   Recent Labs   Lab 01/25/21  0934 01/24/21  1134 01/23/21  1401 01/23/21  0557 01/22/21  1729 01/22/21  1729 01/22/21  1639 01/22/21  1025 01/22/21  1025   WBC 3.5* 2.9*  --  2.2*  --   --   --   --  4.9   HGB 8.7* 8.0* 7.8* 7.5*   < >  --   --    < > 8.7*   MCV 96 97  --  98  --   --   --   --  96   PLT 76* 70*  --  65*  --  99*  --   --  131*   INR  --   --   --   --   --  1.15* 1.12  --  1.08    133  --  136  --   --   --   --  137   POTASSIUM 3.6 4.0  --  4.5  --   --   --   --  4.1   CHLORIDE 103 102  --  104  --   --   --   --  104   CO2 23 25  --  21  --   --   --   --  17*   BUN 30 35*  --  31*  --   --   --   --  25   CR 2.04* 2.25*  --  2.20*  --   --   --   --  1.59*   ANIONGAP 7 6  --  11  --   --   --   --  16*   KEV 8.2* 8.2*  --  8.2*  --   --   --   --  8.3*   * 195*  --  75  --   --   --   --  66*   ALBUMIN  --   --   --  2.4*  --   --   --   --  2.9*   PROTTOTAL  --   --   --  5.6*  --   --   --   --  6.8   BILITOTAL  --   --   --  1.6*  --   --   --   --  1.6*   ALKPHOS  --   --   --  208*  --   --   --   --  279*   ALT  --   --   --  35  --   --   --   --  42   AST 56*  --   --  84*  --   --   --   --  105*    < > = values in this interval not displayed.       No results found for this or any previous visit (from the past 24 hour(s)).

## 2021-01-25 NOTE — PLAN OF CARE
Summary: Pt came in with weakness and bloody stools.     DATE & TIME: 1/25/21 5708-3451  Cognitive Concerns/ Orientation : Alert/Oriented x 4  BEHAVIOR & AGGRESSION TOOL COLOR: Green, calm and cooperate; anxious at times, Seroquel x1  CIWA SCORE:4, 4  ABNL VS/O2: VSS on RA  MOBILITY: SBA, gait belt and walker. Generalized weakness  PAIN MANAGMENT: generalized pain, declines interventions   DIET: Regular, fair po  BOWEL/BLADDER:Voiding some per urinal.  BM x 1 this shift  ABNL LAB/BG: Hgb 8.7->8; Cr 2.2- DRAIN/DEVICES: PIV SL  TELEMETRY RHYTHM: NA  SKIN: Bruising.   TESTS/PROCEDURES: n/a  D/C DAY/GOALS/PLACE: 1-2 days, states that has sober house plans lined up  OTHER IMPORTANT INFO: , Bladder scan x1 for 250cc, declined CPAP overnight. GI following.

## 2021-01-26 ENCOUNTER — APPOINTMENT (OUTPATIENT)
Dept: ULTRASOUND IMAGING | Facility: CLINIC | Age: 66
DRG: 378 | End: 2021-01-26
Attending: INTERNAL MEDICINE
Payer: MEDICARE

## 2021-01-26 ENCOUNTER — APPOINTMENT (OUTPATIENT)
Dept: PHYSICAL THERAPY | Facility: CLINIC | Age: 66
DRG: 378 | End: 2021-01-26
Attending: HOSPITALIST
Payer: MEDICARE

## 2021-01-26 LAB
ALBUMIN SERPL-MCNC: 2.6 G/DL (ref 3.4–5)
ALP SERPL-CCNC: 191 U/L (ref 40–150)
ALT SERPL W P-5'-P-CCNC: 36 U/L (ref 0–70)
ANION GAP SERPL CALCULATED.3IONS-SCNC: 8 MMOL/L (ref 3–14)
AST SERPL W P-5'-P-CCNC: 61 U/L (ref 0–45)
BILIRUB SERPL-MCNC: 0.7 MG/DL (ref 0.2–1.3)
BUN SERPL-MCNC: 30 MG/DL (ref 7–30)
CALCIUM SERPL-MCNC: 8 MG/DL (ref 8.5–10.1)
CHLORIDE SERPL-SCNC: 103 MMOL/L (ref 94–109)
CO2 SERPL-SCNC: 23 MMOL/L (ref 20–32)
CREAT SERPL-MCNC: 1.7 MG/DL (ref 0.66–1.25)
ERYTHROCYTE [DISTWIDTH] IN BLOOD BY AUTOMATED COUNT: 15.7 % (ref 10–15)
GFR SERPL CREATININE-BSD FRML MDRD: 41 ML/MIN/{1.73_M2}
GLUCOSE SERPL-MCNC: 143 MG/DL (ref 70–99)
HCT VFR BLD AUTO: 25.6 % (ref 40–53)
HGB BLD-MCNC: 8.4 G/DL (ref 13.3–17.7)
MAGNESIUM SERPL-MCNC: 1.8 MG/DL (ref 1.6–2.3)
MCH RBC QN AUTO: 32.7 PG (ref 26.5–33)
MCHC RBC AUTO-ENTMCNC: 32.8 G/DL (ref 31.5–36.5)
MCV RBC AUTO: 100 FL (ref 78–100)
PHOSPHATE SERPL-MCNC: 2.4 MG/DL (ref 2.5–4.5)
PLATELET # BLD AUTO: 84 10E9/L (ref 150–450)
POTASSIUM SERPL-SCNC: 3.3 MMOL/L (ref 3.4–5.3)
POTASSIUM SERPL-SCNC: 3.7 MMOL/L (ref 3.4–5.3)
PROT SERPL-MCNC: 6.3 G/DL (ref 6.8–8.8)
RBC # BLD AUTO: 2.57 10E12/L (ref 4.4–5.9)
SODIUM SERPL-SCNC: 134 MMOL/L (ref 133–144)
WBC # BLD AUTO: 2.9 10E9/L (ref 4–11)

## 2021-01-26 PROCEDURE — 84132 ASSAY OF SERUM POTASSIUM: CPT | Performed by: INTERNAL MEDICINE

## 2021-01-26 PROCEDURE — 0W9G3ZZ DRAINAGE OF PERITONEAL CAVITY, PERCUTANEOUS APPROACH: ICD-10-PCS | Performed by: PHYSICIAN ASSISTANT

## 2021-01-26 PROCEDURE — 250N000013 HC RX MED GY IP 250 OP 250 PS 637: Performed by: INTERNAL MEDICINE

## 2021-01-26 PROCEDURE — 999N000154 HC STATISTIC RADIOLOGY XRAY, US, CT, MAR, NM

## 2021-01-26 PROCEDURE — 84100 ASSAY OF PHOSPHORUS: CPT | Performed by: PHYSICIAN ASSISTANT

## 2021-01-26 PROCEDURE — 85027 COMPLETE CBC AUTOMATED: CPT | Performed by: PHYSICIAN ASSISTANT

## 2021-01-26 PROCEDURE — 80053 COMPREHEN METABOLIC PANEL: CPT | Performed by: PHYSICIAN ASSISTANT

## 2021-01-26 PROCEDURE — 36415 COLL VENOUS BLD VENIPUNCTURE: CPT | Performed by: INTERNAL MEDICINE

## 2021-01-26 PROCEDURE — 99232 SBSQ HOSP IP/OBS MODERATE 35: CPT | Performed by: INTERNAL MEDICINE

## 2021-01-26 PROCEDURE — 83735 ASSAY OF MAGNESIUM: CPT | Performed by: PHYSICIAN ASSISTANT

## 2021-01-26 PROCEDURE — 97116 GAIT TRAINING THERAPY: CPT | Mod: GP

## 2021-01-26 PROCEDURE — 99207 PR CDG-MDM COMPONENT: MEETS MODERATE - DOWN CODED: CPT | Performed by: INTERNAL MEDICINE

## 2021-01-26 PROCEDURE — 49083 ABD PARACENTESIS W/IMAGING: CPT

## 2021-01-26 PROCEDURE — 250N000013 HC RX MED GY IP 250 OP 250 PS 637: Performed by: HOSPITALIST

## 2021-01-26 PROCEDURE — 120N000001 HC R&B MED SURG/OB

## 2021-01-26 PROCEDURE — 36415 COLL VENOUS BLD VENIPUNCTURE: CPT | Performed by: PHYSICIAN ASSISTANT

## 2021-01-26 PROCEDURE — 250N000011 HC RX IP 250 OP 636: Performed by: INTERNAL MEDICINE

## 2021-01-26 PROCEDURE — 83735 ASSAY OF MAGNESIUM: CPT | Performed by: INTERNAL MEDICINE

## 2021-01-26 RX ORDER — NALOXONE HYDROCHLORIDE 0.4 MG/ML
0.2 INJECTION, SOLUTION INTRAMUSCULAR; INTRAVENOUS; SUBCUTANEOUS
Status: DISCONTINUED | OUTPATIENT
Start: 2021-01-26 | End: 2021-01-26

## 2021-01-26 RX ORDER — LIDOCAINE HYDROCHLORIDE 10 MG/ML
10 INJECTION, SOLUTION EPIDURAL; INFILTRATION; INTRACAUDAL; PERINEURAL ONCE
Status: COMPLETED | OUTPATIENT
Start: 2021-01-26 | End: 2021-01-26

## 2021-01-26 RX ORDER — FLUMAZENIL 0.1 MG/ML
0.2 INJECTION, SOLUTION INTRAVENOUS
Status: DISCONTINUED | OUTPATIENT
Start: 2021-01-26 | End: 2021-01-26

## 2021-01-26 RX ORDER — DEXTROSE MONOHYDRATE 25 G/50ML
25-50 INJECTION, SOLUTION INTRAVENOUS
Status: DISCONTINUED | OUTPATIENT
Start: 2021-01-26 | End: 2021-01-26

## 2021-01-26 RX ORDER — ALBUMIN (HUMAN) 12.5 G/50ML
12.5 SOLUTION INTRAVENOUS ONCE
Status: DISCONTINUED | OUTPATIENT
Start: 2021-01-27 | End: 2021-01-26 | Stop reason: CLARIF

## 2021-01-26 RX ORDER — FENTANYL CITRATE 50 UG/ML
25-50 INJECTION, SOLUTION INTRAMUSCULAR; INTRAVENOUS EVERY 5 MIN PRN
Status: DISCONTINUED | OUTPATIENT
Start: 2021-01-26 | End: 2021-01-26

## 2021-01-26 RX ORDER — NALOXONE HYDROCHLORIDE 0.4 MG/ML
0.4 INJECTION, SOLUTION INTRAMUSCULAR; INTRAVENOUS; SUBCUTANEOUS
Status: DISCONTINUED | OUTPATIENT
Start: 2021-01-26 | End: 2021-01-26

## 2021-01-26 RX ORDER — NICOTINE POLACRILEX 4 MG
15-30 LOZENGE BUCCAL
Status: DISCONTINUED | OUTPATIENT
Start: 2021-01-26 | End: 2021-01-26

## 2021-01-26 RX ORDER — POTASSIUM CHLORIDE 1500 MG/1
40 TABLET, EXTENDED RELEASE ORAL ONCE
Status: COMPLETED | OUTPATIENT
Start: 2021-01-26 | End: 2021-01-26

## 2021-01-26 RX ADMIN — VORTIOXETINE 10 MG: 10 TABLET, FILM COATED ORAL at 08:25

## 2021-01-26 RX ADMIN — PANTOPRAZOLE SODIUM 40 MG: 40 TABLET, DELAYED RELEASE ORAL at 08:25

## 2021-01-26 RX ADMIN — MULTIPLE VITAMINS W/ MINERALS TAB 1 TABLET: TAB at 08:30

## 2021-01-26 RX ADMIN — TAMSULOSIN HYDROCHLORIDE 0.8 MG: 0.4 CAPSULE ORAL at 08:24

## 2021-01-26 RX ADMIN — FOLIC ACID 1 MG: 1 TABLET ORAL at 08:30

## 2021-01-26 RX ADMIN — QUETIAPINE 50 MG: 25 TABLET, FILM COATED ORAL at 10:54

## 2021-01-26 RX ADMIN — POTASSIUM CHLORIDE 40 MEQ: 1500 TABLET, EXTENDED RELEASE ORAL at 12:25

## 2021-01-26 RX ADMIN — FLUTICASONE FUROATE AND VILANTEROL TRIFENATATE 1 PUFF: 200; 25 POWDER RESPIRATORY (INHALATION) at 08:38

## 2021-01-26 RX ADMIN — HYDROXYZINE HYDROCHLORIDE 50 MG: 25 TABLET, FILM COATED ORAL at 01:56

## 2021-01-26 RX ADMIN — CARBIDOPA AND LEVODOPA 7.5 MG: 50; 200 TABLET, EXTENDED RELEASE ORAL at 17:09

## 2021-01-26 RX ADMIN — NICOTINE 1 PATCH: 21 PATCH, EXTENDED RELEASE TRANSDERMAL at 08:35

## 2021-01-26 RX ADMIN — MIRTAZAPINE 30 MG: 15 TABLET, FILM COATED ORAL at 22:00

## 2021-01-26 RX ADMIN — QUETIAPINE FUMARATE 100 MG: 100 TABLET ORAL at 22:00

## 2021-01-26 RX ADMIN — POTASSIUM & SODIUM PHOSPHATES POWDER PACK 280-160-250 MG 1 PACKET: 280-160-250 PACK at 22:00

## 2021-01-26 RX ADMIN — CARBIDOPA AND LEVODOPA 7.5 MG: 50; 200 TABLET, EXTENDED RELEASE ORAL at 12:24

## 2021-01-26 RX ADMIN — QUETIAPINE 50 MG: 25 TABLET, FILM COATED ORAL at 17:13

## 2021-01-26 RX ADMIN — TRAZODONE HYDROCHLORIDE 100 MG: 100 TABLET ORAL at 22:00

## 2021-01-26 RX ADMIN — POTASSIUM & SODIUM PHOSPHATES POWDER PACK 280-160-250 MG 1 PACKET: 280-160-250 PACK at 17:05

## 2021-01-26 RX ADMIN — PANTOPRAZOLE SODIUM 40 MG: 40 TABLET, DELAYED RELEASE ORAL at 17:06

## 2021-01-26 RX ADMIN — ONDANSETRON 4 MG: 4 TABLET, ORALLY DISINTEGRATING ORAL at 04:27

## 2021-01-26 RX ADMIN — LIDOCAINE HYDROCHLORIDE 10 ML: 10 INJECTION, SOLUTION EPIDURAL; INFILTRATION; INTRACAUDAL; PERINEURAL at 14:28

## 2021-01-26 ASSESSMENT — ACTIVITIES OF DAILY LIVING (ADL)
ADLS_ACUITY_SCORE: 12
ADLS_ACUITY_SCORE: 11
ADLS_ACUITY_SCORE: 12
ADLS_ACUITY_SCORE: 11
ADLS_ACUITY_SCORE: 11
ADLS_ACUITY_SCORE: 12

## 2021-01-26 ASSESSMENT — MIFFLIN-ST. JEOR: SCORE: 1567.63

## 2021-01-26 NOTE — PROGRESS NOTES
[facility]    Medicine Progress Note - Hospitalist Service       Date of Admission:  1/22/2021    Assessment & Plan   Jim Richard is a 65 year old male with hx of alcoholic cirrhosis with ongoing alcohol use, HTN, CKD, COPD with ongoing tobacco use, and depression/anxiety who was admitted on 1/22/2021 for melena, NGUYEN, and alcohol withdrawal    Acute GI bleed with acute blood loss anemia  Presented for evaluation of melena and generalized weakness. Hgb fabiola 7.5 from baseline 8-9. On admission, he was initiated on bowel rest, IV pantoprazole, octreotide, and [facility] was consulted. EGD (1/23) showed esophagitis, grade I esophageal varices, portal hypertensive gastropathy, and erythematous duodenopathy, but no active bleeding  - Hgb now stable in mid 8 range  - Continues on pantoprazole 40 mg BID  - [facility] to arrange follow up in 1-2 weeks    NGUYEN on CKD stage IIIa, Improved  Creatinine up to 2.25 from 1.59 on admission. Question pre-renal state from Lasix vs evolving hepatorenal syndrome. He received a dose of albumin and PTA midodrine was increased per GI with improvement  - Creatinine 1.70 today, will follow  - PTA midodrine has been increased from 5 to 7.5 mg BID  - Holding PTA Lasix    Acute alcohol withdrawal, Resolved  Alcoholic hepatitis  Hypokalemia  Has been drinking vodka 750 mL daily at least since JEFF. Developed withdrawal symptoms following admission, and was treated with lorazepam per [facility] protocol. He also received thiamine/folate/MVI. CD consult was offered and declined.   - d/c CIWA protocol  - On thiamine/folate/MVI  - On K/Mg/Phos replacement protocols  - Patient has arranged outpatient treatment at [facility] after discharge    Alcoholic cirrhosis with ascites and esophageal varices s/p banding x6 in April 2018  Undergoes therapeutic paracentesis every 2 weeks.   - s/p paracentesis on 1/22  - Repeat paracentesis today, 1/26  - GI to consider TIPS in the  future    Thrombocytopenia  Platelet fabiola 65k. Improved with alcohol abstinence  - Platelets mid-70s and improving    BPH with urinary retention  High PVRs noted during admission  - PTA tamsulosin has been increased to 0.8 mg daily    Essential hypertension  - Continues on PTA atenolol  - Holding PTA Lasix due to NGUYEN    COPD  Tobacco use disorder  - Continues on PTA inhalers  - Nicotine patch in place    JANIS  - Continues on CPAP    Major recurrent depressive disorder with anxiety  - Continues on PTA vortioxetine, mirtazapine, Seroquel, and trazodone     Diet: Regular Diet Adult    DVT Prophylaxis: Pneumatic Compression Devices  Leger Catheter: not present  Code Status: Full Code         Disposition: Expected discharge home tomorrow if electrolytes and renal function improve    The patient's care was discussed with the Bedside Nurse, Care Coordinator/ and Patient.    Sara Orellana MD  Hospitalist Service  [facility]  Contact information available via [facility] Paging/Directory    ______________________________________________________________________    Interval History   No events overnight. Did well with PT today. Reports increased abdominal distension, but offers no other concerns. Denies cp/sob, dizziness/lightheadedness, or nausea    Data reviewed today: I reviewed all medications, new labs and imaging results over the last 24 hours. I personally reviewed no images or EKG's today.    Physical Exam   Vital Signs: Temp: 99.2  F (37.3  C) Temp src: Oral BP: 107/61 Pulse: 65   Resp: 18 SpO2: 94 % O2 Device: None (Room air)    Weight: 182 lbs 12.18 oz  Constitutional: Resting comfortably, NAD  HEENT: Sclera white, MMM  Respiratory: Breathing non-labored. Lungs CTAB - no wheezes, crackles, or rhonchi  Cardiovascular: Heart RRR, no m/r/g. No pedal edema  GI: +BS, abd distended, but soft/NT  Skin/Integument: No rash  Musculoskeletal: Normal muscle bulk and tone  Neuro: Alert and  appropriate, JUSTINO  Psych: Calm and cooperative    Data   Recent Labs   Lab 01/26/21  1017 01/25/21  0934 01/24/21  1134 01/23/21  0557 01/23/21  0557 01/22/21  1729 01/22/21  1729 01/22/21  1639 01/22/21  1025 01/22/21  1025   WBC 2.9* 3.5* 2.9*  --  2.2*  --   --   --   --  4.9   HGB 8.4* 8.7* 8.0*   < > 7.5*   < >  --   --    < > 8.7*    96 97  --  98  --   --   --   --  96   PLT 84* 76* 70*  --  65*  --  99*  --   --  131*   INR  --   --   --   --   --   --  1.15* 1.12  --  1.08    133 133  --  136  --   --   --   --  137   POTASSIUM 3.3* 3.6 4.0  --  4.5  --   --   --   --  4.1   CHLORIDE 103 103 102  --  104  --   --   --   --  104   CO2 23 23 25  --  21  --   --   --   --  17*   BUN 30 30 35*  --  31*  --   --   --   --  25   CR 1.70* 2.04* 2.25*  --  2.20*  --   --   --   --  1.59*   ANIONGAP 8 7 6  --  11  --   --   --   --  16*   KEV 8.0* 8.2* 8.2*  --  8.2*  --   --   --   --  8.3*   * 132* 195*  --  75  --   --   --   --  66*   ALBUMIN 2.6*  --   --   --  2.4*  --   --   --   --  2.9*   PROTTOTAL 6.3*  --   --   --  5.6*  --   --   --   --  6.8   BILITOTAL 0.7  --   --   --  1.6*  --   --   --   --  1.6*   ALKPHOS 191*  --   --   --  208*  --   --   --   --  279*   ALT 36  --   --   --  35  --   --   --   --  42   AST 61* 56*  --   --  84*  --   --   --   --  105*    < > = values in this interval not displayed.         No results found for this or any previous visit (from the past 24 hour(s)).    Medications       atenolol  25 mg Oral Daily     fluticasone-vilanterol  1 puff Inhalation Daily     folic acid  1 mg Oral Daily     midodrine  7.5 mg Oral TID w/meals     mirtazapine  30 mg Oral At Bedtime     multivitamin w/minerals  1 tablet Oral Daily     nicotine  1 patch Transdermal Daily     nicotine   Transdermal Q8H     pantoprazole  40 mg Oral BID AC     QUEtiapine  100 mg Oral At Bedtime     sodium chloride (PF)  3 mL Intracatheter Q8H     tamsulosin  0.8 mg Oral Daily     traZODone   100 mg Oral At Bedtime     vortioxetine  10 mg Oral Daily

## 2021-01-26 NOTE — PROGRESS NOTES
[facility]  Gastroenterology Progress Note     Jim Richard MRN# [id number]   YOB: 1955 Age: 65 year old          Assessment and Plan:     Jim Richard is a 65 year old male who was admitted on 1/22/2021. I was asked to see the patient for melena w/ possible GI bleeding.     Alcoholic liver cirrhosis with portal hypertension  Chronic anemia  Ascites  Hemoglobin stable in 7-8 now improved to 8.7.  1/23/2021 EGD noted grade I esophageal varices, portal hypertension gastropathy. no evidence of GI bleed. Anemia thought to be multifactorial from portal hypertension and CKD.  Patient unable to tolerate diuretics. Had paracentesis for ascites.3.2 L removed.  Can repeat prn.  -he may be a candidate for TIPs given persistent ascites, no HE and prior normal ejection fraction, however he is still constantly drinking and likely would not qualify  Would recommend chemical dependency consult  Creatinine climbing concern for hepatorenal syndrome.  -- Continue with BID pantoprazole  -- Daily labs  -- Regular diet  -- Continue midodrine 7.5 mg TID  -- When able, ok with GI to discharge patient and f/u closely as an outpatient in 1-2 weeks       Gastrointestinal hemorrhage, unspecified gastrointestinal hemorrhage type      Interval History:   no new complaints, doing well, denies chest pain, denies shortness of breath, pain is controlled, alert, oriented to person, place and time, has had a bowel movement in the last 24 hours and doing well; no cp, sob, n/v/d, or abd pain.              Review of Systems:   C: NEGATIVE for fever, chills, change in weight  E/M: NEGATIVE for ear, mouth and throat problems  R: NEGATIVE for significant cough or SOB  CV: NEGATIVE for chest pain, palpitations or peripheral edema             Medications:   I have reviewed this patient's current medications    atenolol  25 mg Oral Daily     fluticasone-vilanterol  1 puff Inhalation Daily     folic acid  1 mg  Oral Daily     midodrine  7.5 mg Oral TID w/meals     mirtazapine  30 mg Oral At Bedtime     multivitamin w/minerals  1 tablet Oral Daily     nicotine  1 patch Transdermal Daily     nicotine   Transdermal Q8H     pantoprazole  40 mg Oral BID AC     QUEtiapine  100 mg Oral At Bedtime     sodium chloride (PF)  3 mL Intracatheter Q8H     tamsulosin  0.8 mg Oral Daily     traZODone  100 mg Oral At Bedtime     vortioxetine  10 mg Oral Daily                  Physical Exam:   Vitals were reviewed  Vital Signs with Ranges  Temp:  [98  F (36.7  C)-99.2  F (37.3  C)] 99.2  F (37.3  C)  Pulse:  [60-79] 65  Resp:  [18] 18  BP: (103-147)/(57-78) 107/61  SpO2:  [94 %-97 %] 94 %  I/O last 3 completed shifts:  In: -   Out: 325 [Urine:325]  Constitutional: healthy, alert and no distress   Cardiovascular: negative, PMI normal. No lifts, heaves, or thrills. RRR. No murmurs, clicks gallops or rub  Respiratory: negative, Percussion normal. Good diaphragmatic excursion. Lungs clear  Head: Normocephalic. No masses, lesions, tenderness or abnormalities  Neck: Neck supple. No adenopathy. Thyroid symmetric, normal size,, Carotids without bruits.  Abdomen: Abdomen soft, non-tender. BS normal. No masses, organomegaly           Data:   I reviewed the patient's new clinical lab test results.   Recent Labs   Lab Test 01/25/21  0934 01/24/21  1134 01/23/21  1401 01/23/21  0557 01/22/21  1729 01/22/21  1729 01/22/21  1639 01/22/21  1025 01/22/21  1025   WBC 3.5* 2.9*  --  2.2*  --   --   --   --  4.9   HGB 8.7* 8.0* 7.8* 7.5*   < >  --   --    < > 8.7*   MCV 96 97  --  98  --   --   --   --  96   PLT 76* 70*  --  65*  --  99*  --   --  131*   INR  --   --   --   --   --  1.15* 1.12  --  1.08    < > = values in this interval not displayed.     Recent Labs   Lab Test 01/25/21  0934 01/24/21  1134 01/23/21  0557   POTASSIUM 3.6 4.0 4.5   CHLORIDE 103 102 104   CO2 23 25 21   BUN 30 35* 31*   ANIONGAP 7 6 11     Recent Labs   Lab Test 01/25/21  0934  01/23/21  0557 01/22/21  1025 11/03/20  1021 10/29/20  0938 10/14/20  1530 10/14/20  1530 10/08/20  2340 10/08/20  2340 10/06/20  1610 10/06/20  1610 10/05/20  1901 10/05/20  1901 05/26/20  1612 05/26/20  1612 01/11/20  1730 01/11/20  1730   ALBUMIN  --  2.4* 2.9* 2.8* 2.8*   < > 3.0*   < >  --    < >  --    < > 3.3*   < >  --    < >  --    BILITOTAL  --  1.6* 1.6*  --  0.3   < > 0.6  --   --   --   --    < > 0.9   < >  --    < >  --    ALT  --  35 42  --  20   < > 20  --   --   --   --    < > 22   < >  --    < >  --    AST 56* 84* 105*  --  22   < > 36  --   --   --   --    < > 39   < >  --    < >  --    PROTEIN  --   --   --   --   --   --   --   --   --   --  10*  --   --   --  30*  --  Negative   LIPASE  --   --   --   --   --   --  236  --  298  --   --   --  348  --   --    < >  --     < > = values in this interval not displayed.       I reviewed the patient's new imaging results.    All laboratory data reviewed  All imaging studies reviewed by me.    Gloria De Leon PA-C,  1/26/2021  [facility]  Office : [phone]  Cell: [phone] (Dr. Shaw)  Cell: [phone] (Gloria De Leon PA-C)

## 2021-01-26 NOTE — PROGRESS NOTES
Care Management Follow Up    Length of Stay (days): 4    Expected Discharge Date: 01/28/21     Concerns to be Addressed: substance/tobacco abuse/use, care coordination/care conferences, discharge planning     Patient plan of care discussed at interdisciplinary rounds: Yes    Anticipated Discharge Disposition:       Anticipated Discharge Services:    Anticipated Discharge DME:      Patient/family educated on Medicare website which has current facility and service quality ratings:    Education Provided on the Discharge Plan:    Patient/Family in Agreement with the Plan:      Referrals Placed by CM/SW:    Private pay costs discussed:      Additional Information:    Follow up to conversation hospital care coordinator had with patient's CM.    Call placed and message left to [facility]'s [facility]  Omayra Nelson at [phone].  Requesting to know if patient is still under a Full Commitment with provisional discharge to home. Also since she would like patient to attend [place] out patient programming after discharge does she request the Substance Abuse Assessment occur while patient is in the hospital.        Earline Mauricio, LICSW

## 2021-01-26 NOTE — PLAN OF CARE
Cognitive Concerns/ Orientation: A&O x3, disoriented to time.   BEHAVIOR & AGGRESSION TOOL COLOR: Green  CIWA SCORE:4, 4  ABNL VS/O2: VSS on RA, declined CPAP overnight   MOBILITY: SBA, gait belt and walker  PAIN MANAGMENT: denies pain   DIET: Regular diet   BOWEL/BLADDER: continent, voided once overnight; unable to accurate bladder scan due to ascites.   ABNL LAB/BG: Creat 2.04, Hgb 8.7, WBC 3.5, Platelets 76   DRAIN/DEVICES: PIV, saline locked   TELEMETRY RHYTHM: n/a  SKIN: Bruises   TESTS/PROCEDURES: n/a  D/C DAY/GOALS/PLACE: 1-2 days, states that has sober house plans lined up  OTHER IMPORTANT INFO: Abd rounded/firm. GI following. C/o nausea, zofran given.

## 2021-01-26 NOTE — PLAN OF CARE
Summary: Pt came in with weakness and bloody stools.     DATE & TIME: 1/26/21 9735-6595  Cognitive Concerns/ Orientation: A&O x4.   BEHAVIOR & AGGRESSION TOOL COLOR: Green  CIWA SCORE:2, discontinued  ABNL VS/O2: VSS on RA, no CPAP  MOBILITY: SBA, gait belt and walker  PAIN MANAGMENT: denies pain   DIET: Regular diet   BOWEL/BLADDER: continent, multiple BM and void during day shift   ABNL LAB/BG: Creat 1.7, Hgb 8.4, WBC 2.9, K 3.3, P 2.4, Mg 1.8  DRAIN/DEVICES: PIV, saline locked   TELEMETRY RHYTHM: n/a  SKIN: Bruises   TESTS/PROCEDURES: Paracentesis removed 3.4L  D/C DAY/GOALS/PLACE: 1-2 days, states that has sober house plans lined up  OTHER IMPORTANT INFO: Abd rounded/firm. GI following. C/o minor anxiety, Seroquel given.

## 2021-01-26 NOTE — PROGRESS NOTES
"SPIRITUAL HEALTH SERVICES Progress Note  [facility]    Length-of-Stay attempt.    Upon my introduction, Ptnt said he is \"doing ok.\" He was eating and asked me to come back another time.    SH will follow.    Andreina Cortez  Chaplain Resident  "

## 2021-01-26 NOTE — PROGRESS NOTES
paracentesis complete. 3.4 L removed from right side of abdomen.   Light re in color.  Tolerated procedure well, VSS.  Band aid to site CDI, denies pain.    Report called to: Ce JUSTIN Rn at 1447  Transported back to room at in stable pain free condition at 144

## 2021-01-26 NOTE — PLAN OF CARE
Summary: Pt came in with weakness and bloody stools.     DATE & TIME: 1/25/21 1009-7998  Cognitive Concerns/ Orientation : Disoriented time, calm, cooperative   BEHAVIOR & AGGRESSION TOOL COLOR: Green, Seroquel x1 for mild anxiety  CIWA SCORE:3,3  ABNL VS/O2: VSS on RA  MOBILITY: SBA, gait belt and walker. Generalized weakness  PAIN MANAGMENT: generalized pain, declines interventions   DIET: Regular, fair po  BOWEL/BLADDER:Voiding some per urinal and BR. Voiding very small amounts. BS showed almost 400ml but straight cath only put out 100ml of concentrated urine. Bladder scanned again and still had >300ml in bladder. Wondering if ascites is impacting bladder scan accuracy.  ABNL LAB/BG: Hgb 8.7->8; Cr 2.04  DRAIN/DEVICES: new PIV SL  TELEMETRY RHYTHM: NA  SKIN: Bruising.   TESTS/PROCEDURES: n/a  D/C DAY/GOALS/PLACE: 1-2 days, states that has sober house plans lined up  OTHER IMPORTANT INFO: declined CPAP overnight. GI following.

## 2021-01-26 NOTE — PROVIDER NOTIFICATION
MD Notification    Notified Person: MD    Notified Person Name: Dr. Vences      Notification Date/Time: 1/25 2113    Notification Interaction: Web paged     Purpose of Notification: Midodrine held for htn at 1700. Clonidine mistakenly given for SBP <120 at 2000. Recheck was 103/57. Do you want midodrine given now? Or continue to monitor?    Orders Received:    Comments:

## 2021-01-26 NOTE — PROGRESS NOTES
"SPIRITUAL HEALTH SERVICES Progress Note  [facility]    Length-of-Stay follow-up.    Ptnt said he is \"coping ok,\" and he shared his intention to \"do better\" with \"taking his meds\" and \"eating better.\" He said he knows \"he just has to.\"    He spoke of AA and Hoahaoism-going as being supportive and things he \"should do more.\" Ptnt mentioned the need to let go of the past and take things \"one day at a time.\"     He hopes to be able to go back to his home and have care services in to assist with food and medications. He mentioned his dog as part of his desire to go back home.    Ptnt requested a prayer, saying he's Bahai, and was raised [employer], \"the same thing.\"    I offered reflective conversation and emotional support, encouraged self-care, and said a prayer.    SH remains available.    Andreina Cortez  Chaplain Resident  "

## 2021-01-27 VITALS
HEART RATE: 61 BPM | RESPIRATION RATE: 16 BRPM | BODY MASS INDEX: 28.69 KG/M2 | TEMPERATURE: 98.3 F | HEIGHT: 67 IN | OXYGEN SATURATION: 97 % | DIASTOLIC BLOOD PRESSURE: 63 MMHG | SYSTOLIC BLOOD PRESSURE: 116 MMHG | WEIGHT: 182.76 LBS

## 2021-01-27 LAB
ANION GAP SERPL CALCULATED.3IONS-SCNC: 7 MMOL/L (ref 3–14)
BUN SERPL-MCNC: 29 MG/DL (ref 7–30)
CALCIUM SERPL-MCNC: 7.8 MG/DL (ref 8.5–10.1)
CHLORIDE SERPL-SCNC: 106 MMOL/L (ref 94–109)
CO2 SERPL-SCNC: 22 MMOL/L (ref 20–32)
CREAT SERPL-MCNC: 1.73 MG/DL (ref 0.66–1.25)
ERYTHROCYTE [DISTWIDTH] IN BLOOD BY AUTOMATED COUNT: 15.4 % (ref 10–15)
GFR SERPL CREATININE-BSD FRML MDRD: 40 ML/MIN/{1.73_M2}
GLUCOSE SERPL-MCNC: 117 MG/DL (ref 70–99)
HCT VFR BLD AUTO: 24 % (ref 40–53)
HGB BLD-MCNC: 7.8 G/DL (ref 13.3–17.7)
MAGNESIUM SERPL-MCNC: 1.8 MG/DL (ref 1.6–2.3)
MCH RBC QN AUTO: 31.1 PG (ref 26.5–33)
MCHC RBC AUTO-ENTMCNC: 32.5 G/DL (ref 31.5–36.5)
MCV RBC AUTO: 96 FL (ref 78–100)
PLATELET # BLD AUTO: 79 10E9/L (ref 150–450)
POTASSIUM SERPL-SCNC: 3.9 MMOL/L (ref 3.4–5.3)
RBC # BLD AUTO: 2.51 10E12/L (ref 4.4–5.9)
SODIUM SERPL-SCNC: 135 MMOL/L (ref 133–144)
WBC # BLD AUTO: 2.9 10E9/L (ref 4–11)

## 2021-01-27 PROCEDURE — 80048 BASIC METABOLIC PNL TOTAL CA: CPT | Performed by: INTERNAL MEDICINE

## 2021-01-27 PROCEDURE — 85027 COMPLETE CBC AUTOMATED: CPT | Performed by: INTERNAL MEDICINE

## 2021-01-27 PROCEDURE — 250N000013 HC RX MED GY IP 250 OP 250 PS 637: Performed by: INTERNAL MEDICINE

## 2021-01-27 PROCEDURE — 99239 HOSP IP/OBS DSCHRG MGMT >30: CPT | Performed by: INTERNAL MEDICINE

## 2021-01-27 PROCEDURE — 83735 ASSAY OF MAGNESIUM: CPT | Performed by: INTERNAL MEDICINE

## 2021-01-27 PROCEDURE — 36415 COLL VENOUS BLD VENIPUNCTURE: CPT | Performed by: INTERNAL MEDICINE

## 2021-01-27 PROCEDURE — 250N000013 HC RX MED GY IP 250 OP 250 PS 637: Performed by: HOSPITALIST

## 2021-01-27 RX ORDER — TAMSULOSIN HYDROCHLORIDE 0.4 MG/1
0.8 CAPSULE ORAL DAILY
Qty: 30 CAPSULE | Refills: 0 | Status: ON HOLD | OUTPATIENT
Start: 2021-01-27 | End: 2021-08-22

## 2021-01-27 RX ORDER — QUETIAPINE FUMARATE 50 MG/1
50 TABLET, FILM COATED ORAL 3 TIMES DAILY PRN
Qty: 90 TABLET | Refills: 0 | Status: ON HOLD | OUTPATIENT
Start: 2021-01-27 | End: 2021-08-22

## 2021-01-27 RX ORDER — MIDODRINE HYDROCHLORIDE 2.5 MG/1
7.5 TABLET ORAL
Qty: 270 TABLET | Refills: 0 | Status: SHIPPED | OUTPATIENT
Start: 2021-01-27 | End: 2021-02-26

## 2021-01-27 RX ORDER — PANTOPRAZOLE SODIUM 40 MG/1
40 TABLET, DELAYED RELEASE ORAL 2 TIMES DAILY
Qty: 60 TABLET | Refills: 0 | Status: ON HOLD | OUTPATIENT
Start: 2021-01-27 | End: 2021-08-22

## 2021-01-27 RX ADMIN — PANTOPRAZOLE SODIUM 40 MG: 40 TABLET, DELAYED RELEASE ORAL at 08:15

## 2021-01-27 RX ADMIN — FOLIC ACID 1 MG: 1 TABLET ORAL at 08:17

## 2021-01-27 RX ADMIN — VORTIOXETINE 10 MG: 10 TABLET, FILM COATED ORAL at 08:16

## 2021-01-27 RX ADMIN — FLUTICASONE FUROATE AND VILANTEROL TRIFENATATE 1 PUFF: 200; 25 POWDER RESPIRATORY (INHALATION) at 08:12

## 2021-01-27 RX ADMIN — MULTIPLE VITAMINS W/ MINERALS TAB 1 TABLET: TAB at 08:19

## 2021-01-27 RX ADMIN — NICOTINE 1 PATCH: 21 PATCH, EXTENDED RELEASE TRANSDERMAL at 08:15

## 2021-01-27 RX ADMIN — POTASSIUM & SODIUM PHOSPHATES POWDER PACK 280-160-250 MG 1 PACKET: 280-160-250 PACK at 08:22

## 2021-01-27 RX ADMIN — CARBIDOPA AND LEVODOPA 7.5 MG: 50; 200 TABLET, EXTENDED RELEASE ORAL at 08:16

## 2021-01-27 RX ADMIN — TAMSULOSIN HYDROCHLORIDE 0.8 MG: 0.4 CAPSULE ORAL at 08:20

## 2021-01-27 ASSESSMENT — ACTIVITIES OF DAILY LIVING (ADL)
ADLS_ACUITY_SCORE: 11

## 2021-01-27 NOTE — PROGRESS NOTES
.Discharge    Patient discharged to home via car with cab  Care plan note completed     Listed belongings gathered and returned to patient. Yes-belongings given back from security  Care Plan and Patient education resolved: Yes  Prescriptions if needed, hard copies sent with patient  Yes-pt to  at [facility]  Home and hospital acquired medications returned to patient: NA  Medication Bin checked and emptied on discharge Yes  Follow up appointment made for patient: Yes

## 2021-01-27 NOTE — PLAN OF CARE
Cognitive Concerns/ Orientation: A&O x4.   BEHAVIOR & AGGRESSION TOOL COLOR: Green  CIWA SCORE: n/a (discontinued yesterday)   ABNL VS/O2: VSS on RA, pt declined CPAP  MOBILITY: SBA, gait belt   PAIN MANAGMENT: denies pain   DIET: Regular diet   BOWEL/BLADDER: continent of B/B, voiding adequately, no BM this shift  ABNL LAB/BG: K recheck 3.7; Phos 2.4, replacement initiated, last dose in am, then recheck next am.  DRAIN/DEVICES: PIV, saline locked   TELEMETRY RHYTHM: n/a  SKIN: Bruises. Paracentesis site on R abd WDL, bandage CDI.   TESTS/PROCEDURES: Paracentesis yesterday (3.4 L removed)   D/C DAY/GOALS/PLACE: possibly today, pt states that has sober house plans lined up  OTHER IMPORTANT INFO: Abd distended/rounded/soft; denies nausea; GI following.

## 2021-01-27 NOTE — PLAN OF CARE
Physical Therapy Discharge Summary    Reason for therapy discharge:    Discharged to home.    Progress towards therapy goal(s). See goals on Care Plan in [facility] electronic health record for goal details.  Goals partially met.  Barriers to achieving goals:   discharge from facility.    Therapy recommendation(s):    No further therapy is recommended.

## 2021-01-27 NOTE — PLAN OF CARE
Summary: Pt came in with weakness and bloody stools.     DATE & TIME: 1/26/21 3923-1061  Cognitive Concerns/ Orientation: A&O x4.   BEHAVIOR & AGGRESSION TOOL COLOR: Green  CIWA SCORE: discontinued  ABNL VS/O2: VSS on RA, no CPAP  MOBILITY: SBA, gait belt   PAIN MANAGMENT: denies pain   DIET: Regular diet   BOWEL/BLADDER: continent of B/B, no BM this shift  ABNL LAB/BG: Creat 1.7, Hgb 8.4, WBC 2.9, K 3.3 - replaced on day shift - recheck 3.7, P 2.4 - replacement in process - recheck in am, Mg 1.8  DRAIN/DEVICES: PIV, saline locked   TELEMETRY RHYTHM: n/a  SKIN: Bruises   TESTS/PROCEDURES: Paracentesis removed 3.4L  D/C DAY/GOALS/PLACE: 1-2 days, states that has sober house plans lined up  OTHER IMPORTANT INFO: Abd rounded/firm. GI following. C/o minor anxiety, PRN Seroquel given.

## 2021-01-27 NOTE — DISCHARGE SUMMARY
[facility]  Hospitalist Discharge Summary      Date of Admission:  1/22/2021  Date of Discharge:  1/27/2021  Discharging Provider: Sara Orellana MD    Discharge Diagnoses     1. Acute GI bleed with acute blood loss anemia, unspecified site  2. NGUYEN on CKD stage IIIa  3. Acute alcohol withdrawal  4. Alcoholic hepatitis   5. Hypokalemia  6. Alcoholic cirrhosis with ascites and esophageal varices s/p banding x6 in April 2018  7. Thrombocytopenia  8. BPH with urinary retention  9. Essential hypertension  10. COPD  11. Tobacco use disorder  12. JANIS  13. Major recurrent depressive disorder with anxiety    Follow-ups Needed After Discharge   Follow-up Appointments     Follow-up and recommended labs and tests       Follow up with primary care provider, FERNANDA BRANTLEY, within 7 days   for hospital follow-up.  The following labs/tests are recommended: CBC and   CMP.  Follow up with [facility] treatment program as scheduled           Discharge Disposition   Discharged to home  Condition at discharge: Satisfactory    Hospital Course   Jim Richard is a 65 year old male with hx of alcoholic cirrhosis with ongoing alcohol use, HTN, CKD, COPD with ongoing tobacco use, and depression/anxiety who was admitted on 1/22/2021 for melena, NGUYEN, and alcohol withdrawal. These conditions are resolving and he has been cleared by PT to discharge home.     Acute GI bleed with acute blood loss anemia, unspecified site  Presented for evaluation of melena and generalized weakness. Hgb fabiola 7.5 from baseline 8-9. On admission, he was initiated on bowel rest, IV pantoprazole, octreotide, and [facility] was consulted. EGD (1/23) showed esophagitis, grade I esophageal varices, portal hypertensive gastropathy, and erythematous duodenopathy, but no active bleeding  - Hgb 7.8 at the time of discharge  - Continues on pantoprazole 40 mg BID  - [facility] to arrange follow up in 1-2 weeks    Pancytopenia  Likely due  to alcohol use  - WBC 2.9, Hgb 7.8, Platelets 79K at the time of discharge. CBC stable  - Recommend CBC in next follow up appointment    NGUYEN on CKD stage IIIa, Improved  Creatinine up to 2.25 from 1.59 on admission. Question pre-renal state from Lasix vs evolving hepatorenal syndrome. He received a dose of albumin and PTA midodrine was increased per GI with improvement  - Creatinine in 1.7 range at the time of discharge  - PTA midodrine has been increased from 5 to 7.5 mg BID per GI  - Patient has been advised to hold PTA Lasix    Acute alcohol withdrawal, Resolved  Alcoholic hepatitis  Hypokalemia  Has been drinking vodka 750 mL daily at least since [place]. Developed withdrawal symptoms following admission, and was treated with lorazepam per [facility] protocol. He also received thiamine/folate/MVI. CD consult was offered and declined.   - LFTs elevated, but improving at the time of discharge. AST 61 ALT 36 Alk-P 191 Tbili 0.7  - Recommend CMP at follow up appt  - Patient has arranged outpatient treatment at [facility] after discharge    Alcoholic cirrhosis with ascites and esophageal varices s/p banding x6 in April 2018  Undergoes therapeutic paracentesis every 2 weeks. Underwent paracentesis on 1/22 and 1/26  - GI to consider TIPS in the future    BPH with urinary retention  High PVRs noted during admission  - PTA tamsulosin has been increased from 0.4 to 0.8 mg daily    Essential hypertension  - Continues on PTA atenolol  - Holding PTA Lasix due to NGUYEN    COPD  - Continues on PTA inhalers    Tobacco use disorder  - Continues on PTA nicotine replacement    JANIS  - Continues on CPAP    Major recurrent depressive disorder with anxiety  - Continues on PTA vortioxetine, mirtazapine, and trazodone  - PTA Seroquel was increased from 50 mg daily PRN to TID PRN. He continues on PTA Seroquel 100 mg qhs.    Consultations This Hospital Stay   1. GASTROENTEROLOGY IP CONSULT  2. PHYSICAL THERAPY ADULT IP CONSULT  3. CARE MANAGEMENT /  SOCIAL WORK IP CONSULT    Code Status   Full Code    Time Spent on this Encounter   I, Sara Orellana MD, personally saw the patient today and spent 35 minutes discharging this patient.       Sara Orellana MD  [place] [address] [facility]  [address] [address]  Phone: [phone]  ______________________________________________________________________    Physical Exam   Vital Signs: Temp: 98.3  F (36.8  C) Temp src: Oral BP: 116/63 Pulse: 61   Resp: 16 SpO2: 97 % O2 Device: None (Room air)    Weight: 182 lbs 12.18 oz    Constitutional: Resting comfortably, NAD  HEENT: Sclera white, conjunctiva clear, EOMI, MMM  Respiratory: Breathing non-labored. Lungs CTAB - no wheezes/crackles/rhonchi  Cardiovascular: Heart RRR, no m/r/g. No pedal edema.   GI: +BS. Abd distended, but soft/NT  Skin: Warm and dry. No rash.  Musculoskeletal: Normal muscle bulk and tone  Neurologic: Alert and appropriate. BADILLO  Psychiatric: Calm and cooperative    Primary Care Physician   FERNANDA BRANTLEY    Discharge Orders      Reason for your hospital stay    Anemia due to suspected GI bleed and impairment in kidney function. You were also treated for alcohol withdrawal     Follow-up and recommended labs and tests     Follow up with primary care provider, FERNANDA BRANTLEY, within 7 days for hospital follow-up.  The following labs/tests are recommended: CBC and CMP.  Follow up with [facility] treatment program as scheduled     Activity    Your activity upon discharge: activity as tolerated     Diet    Follow this diet upon discharge: Regular diet       Significant Results and Procedures   Most Recent 3 CBC's:  Recent Labs   Lab Test 01/27/21  0734 01/26/21  1017 01/25/21  0934   WBC 2.9* 2.9* 3.5*   HGB 7.8* 8.4* 8.7*   MCV 96 100 96   PLT 79* 84* 76*     Most Recent 3 BMP's:  Recent Labs   Lab Test 01/27/21  0734 01/26/21  1700 01/26/21  1017 01/25/21  0934     --  134 133    POTASSIUM 3.9 3.7 3.3* 3.6   CHLORIDE 106  --  103 103   CO2 22  --  23 23   BUN 29  --  30 30   CR 1.73*  --  1.70* 2.04*   ANIONGAP 7  --  8 7   KEV 7.8*  --  8.0* 8.2*   *  --  143* 132*     Most Recent 2 LFT's:  Recent Labs   Lab Test 01/26/21  1017 01/25/21  0934 01/23/21  0557   AST 61* 56* 84*   ALT 36  --  35   ALKPHOS 191*  --  208*   BILITOTAL 0.7  --  1.6*       Discharge Medications   Discharge Medication List as of 1/27/2021 10:02 AM      CONTINUE these medications which have CHANGED    Details   midodrine (PROAMATINE) 2.5 MG tablet Take 3 tablets (7.5 mg) by mouth 3 times daily (with meals), Disp-270 tablet, R-0, E-Prescribe      pantoprazole (PROTONIX) 40 MG EC tablet Take 1 tablet (40 mg) by mouth 2 times daily, Disp-60 tablet, R-0, E-Prescribe      !! QUEtiapine (SEROQUEL) 50 MG tablet Take 1 tablet (50 mg) by mouth 3 times daily as needed (anxiety) Filled 11/20/20 #30, Disp-90 tablet, R-0, E-Prescribe      tamsulosin (FLOMAX) 0.4 MG capsule Take 2 capsules (0.8 mg) by mouth daily, Disp-30 capsule, R-0, E-Prescribe       !! - Potential duplicate medications found. Please discuss with provider.      CONTINUE these medications which have NOT CHANGED    Details   acetaminophen (TYLENOL) 325 MG tablet Take 2 tablets (650 mg) by mouth every 4 hours as needed for mild pain, Disp-100 tablet, R-0, E-Prescribe      atenolol (TENORMIN) 25 MG tablet Take 25 mg by mouth daily Filled 9/9/20 #30, Historical      fluticasone-vilanterol (BREO ELLIPTA) 200-25 MCG/INH inhaler Inhale 1 puff into the lungs daily as needed (SOB), Disp-28 each, R-0, E-Prescribe      folic acid (FOLVITE) 1 MG tablet Take 1 tablet (1 mg) by mouth daily, Disp-30 tablet, R-0, E-Prescribe      hydrOXYzine (ATARAX) 50 MG tablet Take 1 tablet (50 mg) by mouth nightly as needed for other (sleep), Disp-30 tablet, R-0, E-Prescribe      magnesium oxide (MAG-OX) 400 MG tablet Take 1 tablet (400 mg) by mouth daily, Disp-30 tablet, R-0,  E-Prescribe      mirtazapine (REMERON) 30 MG tablet Take 30 mg by mouth At Bedtime Filled 1/11/21 for #30, Historical      multivitamin w/minerals (THERA-VIT-M) tablet Take 1 tablet by mouth daily, Disp-30 tablet, R-0, E-Prescribe      nicotine (COMMIT) 2 MG lozenge Place 1 lozenge (2 mg) inside cheek every hour as needed for smoking cessation, Disp-144 lozenge, R-0, E-Prescribe      nicotine (NICODERM CQ) 21 MG/24HR 24 hr patch Place 1 patch onto the skin daily, Disp-30 patch, R-0, E-Prescribe      !! QUEtiapine (SEROQUEL) 100 MG tablet Take 100 mg by mouth At Bedtime Filled 1/11/21 #30, Historical      traZODone (DESYREL) 100 MG tablet Take 100 mg by mouth At Bedtime Filled 1/11/21 #30, Historical      vortioxetine (TRINTELLIX) 10 MG tablet Take 1 tablet (10 mg) by mouth daily, Disp-30 tablet, R-0, E-Prescribe       !! - Potential duplicate medications found. Please discuss with provider.      STOP taking these medications       furosemide (LASIX) 20 MG tablet Comments:   Reason for Stopping:             Allergies   Allergies   Allergen Reactions     Amlodipine Swelling     Lisinopril      Other reaction(s): Angioedema  Mouth and tongue swelling   Mouth and tongue swelling

## 2021-01-27 NOTE — DISCHARGE INSTRUCTIONS
Appt scheduled for next Wednesday February 3rd at 11:10 am at [facility] with Dr Elias.     your prescriptions at [facility] in [address]

## 2021-01-27 NOTE — PROGRESS NOTES
Care Management Discharge Note    Discharge Date: 01/27/21       Discharge Disposition: Home    Discharge Services: None    Discharge DME: None    Discharge Transportation: other (see comments)(taxi arranged by care coordinator)    Private pay costs discussed: Not applicable    PAS Confirmation Code:  N/A  Patient/family educated on Medicare website which has current facility and service quality ratings:  N/A    Education Provided on the Discharge Plan:    Persons Notified of Discharge Plans: see below  Patient/Family in Agreement with the Plan: yes    Handoff Referral Completed: Yes    Additional Information:  Writer contacted Omayra Nelson, patient's behavioral   by voice mail @ [phone] and determined she is on vacation. Til  4-11. Writer contacted her supervisor Elle Nevarez at [phone].  She reports there is another  who will be following up with Jim regarding enrolling into out patient CD programming with [facility].  Ms Nevarez requested patient's discharge summary be emailed to her at [email or web address] which was done this afternoon via Secure email.        TANA CejaSW

## 2021-01-27 NOTE — PLAN OF CARE
Summary: Pt came in with weakness and bloody stools.     DATE & TIME: 1/27/20210 8438-4940  Cognitive Concerns/ Orientation: A&O x4.   BEHAVIOR & AGGRESSION TOOL COLOR: Green  CIWA SCORE: n/a (discontinued yesterday)   ABNL VS/O2: VSS on RA, pt declined CPAP  MOBILITY: SBA  PAIN MANAGMENT: denies pain   DIET: Regular diet   BOWEL/BLADDER: continent of B/B, voiding adequately  ABNL LAB/BG: hgb 7.8  DRAIN/DEVICES: PIV removed  TELEMETRY RHYTHM: n/a  SKIN: Bruises. Paracentesis site on R abd WDL, bandage CDI.   TESTS/PROCEDURES: Paracentesis yesterday (3.4 L removed)   D/C DAY/GOALS/PLACE: Discharge today to home.  OTHER IMPORTANT INFO: Abd distended/rounded/soft; denies nausea.

## 2021-02-01 ENCOUNTER — HOSPITAL ENCOUNTER (OUTPATIENT)
Facility: CLINIC | Age: 66
End: 2021-02-01
Payer: MEDICARE

## 2021-02-01 RX ORDER — NICOTINE POLACRILEX 4 MG
15-30 LOZENGE BUCCAL
Status: CANCELLED | OUTPATIENT
Start: 2021-02-01

## 2021-02-01 RX ORDER — LIDOCAINE 40 MG/G
CREAM TOPICAL
Status: CANCELLED | OUTPATIENT
Start: 2021-02-01

## 2021-02-01 RX ORDER — ALBUMIN (HUMAN) 12.5 G/50ML
12.5 SOLUTION INTRAVENOUS ONCE
Status: CANCELLED | OUTPATIENT
Start: 2021-02-01 | End: 2021-02-01

## 2021-02-01 RX ORDER — DEXTROSE MONOHYDRATE 25 G/50ML
25-50 INJECTION, SOLUTION INTRAVENOUS
Status: CANCELLED | OUTPATIENT
Start: 2021-02-01

## 2021-02-02 ENCOUNTER — TELEPHONE (OUTPATIENT)
Dept: MEDSURG UNIT | Facility: CLINIC | Age: 66
End: 2021-02-02

## 2021-02-03 ENCOUNTER — HOSPITAL ENCOUNTER (OUTPATIENT)
Facility: CLINIC | Age: 66
Discharge: HOME | End: 2021-02-03
Payer: MEDICARE

## 2021-02-03 ENCOUNTER — HOSPITAL ENCOUNTER (OUTPATIENT)
Dept: ULTRASOUND IMAGING | Facility: CLINIC | Age: 66
Discharge: HOME | End: 2021-02-03
Attending: FAMILY MEDICINE | Admitting: FAMILY MEDICINE
Payer: MEDICARE

## 2021-02-03 VITALS
SYSTOLIC BLOOD PRESSURE: 167 MMHG | RESPIRATION RATE: 18 BRPM | HEIGHT: 67 IN | TEMPERATURE: 97.9 F | HEART RATE: 76 BPM | OXYGEN SATURATION: 97 % | DIASTOLIC BLOOD PRESSURE: 75 MMHG | WEIGHT: 190 LBS | BODY MASS INDEX: 29.82 KG/M2

## 2021-02-03 DIAGNOSIS — K70.31 ASCITES DUE TO ALCOHOLIC CIRRHOSIS (H): ICD-10-CM

## 2021-02-03 PROCEDURE — 999N000154 HC STATISTIC RADIOLOGY XRAY, US, CT, MAR, NM

## 2021-02-03 PROCEDURE — 36415 COLL VENOUS BLD VENIPUNCTURE: CPT

## 2021-02-03 PROCEDURE — 49083 ABD PARACENTESIS W/IMAGING: CPT

## 2021-02-03 RX ORDER — ALBUMIN (HUMAN) 12.5 G/50ML
12.5 SOLUTION INTRAVENOUS ONCE
Status: DISCONTINUED | OUTPATIENT
Start: 2021-02-03 | End: 2021-02-03 | Stop reason: HOSPADM

## 2021-02-03 RX ORDER — LIDOCAINE 40 MG/G
CREAM TOPICAL
Status: DISCONTINUED | OUTPATIENT
Start: 2021-02-03 | End: 2021-02-03 | Stop reason: HOSPADM

## 2021-02-03 RX ORDER — DEXTROSE MONOHYDRATE 25 G/50ML
25-50 INJECTION, SOLUTION INTRAVENOUS
Status: DISCONTINUED | OUTPATIENT
Start: 2021-02-03 | End: 2021-02-03 | Stop reason: HOSPADM

## 2021-02-03 RX ORDER — NICOTINE POLACRILEX 4 MG
15-30 LOZENGE BUCCAL
Status: DISCONTINUED | OUTPATIENT
Start: 2021-02-03 | End: 2021-02-03 | Stop reason: HOSPADM

## 2021-02-03 RX ORDER — LIDOCAINE HYDROCHLORIDE 10 MG/ML
10 INJECTION, SOLUTION EPIDURAL; INFILTRATION; INTRACAUDAL; PERINEURAL ONCE
Status: COMPLETED | OUTPATIENT
Start: 2021-02-03 | End: 2021-02-03

## 2021-02-03 RX ADMIN — LIDOCAINE HYDROCHLORIDE 10 ML: 10 INJECTION, SOLUTION EPIDURAL; INFILTRATION; INTRACAUDAL; PERINEURAL at 14:07

## 2021-02-03 ASSESSMENT — MIFFLIN-ST. JEOR: SCORE: 1600.46

## 2021-02-03 NOTE — DISCHARGE INSTRUCTIONS

## 2021-02-03 NOTE — CONSULTS
Care Management Initial Consult    General Information  Assessment completed with: Patient unavailable, in surgery. Nursing informed patient needs taxi ride post procedure.       Primary Care Provider verified and updated as needed:     Readmission within the last 30 days:           Advance Care Planning:            Communication Assessment  Patient's communication style:               Cognitive  Cognitive/Neuro/Behavioral:                        Living Environment:   People in home:     Patient and roommate  Current living Arrangements:        Able to return to prior arrangements:  yes       Family/Social Support:  Care provided by:  self  Provides care for:                  Description of Support System:           Current Resources:   Patient receiving home care services:       Community Resources:    Equipment currently used at home:    Supplies currently used at home:      Employment/Financial:  Employment Status:          Financial Concerns:             Lifestyle & Psychosocial Needs:        Socioeconomic History     Marital status: Single     Spouse name: Not on file     Number of children: 2     Years of education: Not on file     Highest education level: Not on file   Occupational History     Occupation: Printer, on disability     Tobacco Use     Smoking status: Current Every Day Smoker     Packs/day: 1.00     Types: Cigarettes     Smokeless tobacco: Never Used     Tobacco comment: Chantix caused nightmares   Substance and Sexual Activity     Alcohol use: Yes     Comment: reports he drinks .75 of vodka every 2 days     Drug use: No     Sexual activity: Not Currently       Functional Status:  Prior to admission patient needed assistance:              Mental Health Status:          Chemical Dependency Status:                Values/Beliefs:  Spiritual, Cultural Beliefs, Advent Practices, Values that affect care:                 Additional Information:  Patient requesting a taxi ride home. Roommate not able  to , no family or other friends available. DANIELE called [employer] who informed this writer that patient has a self pay account. Ride scheduled and DANIELE informed nursing in [facility] that [employer] will be at door #2 shortly.  later called this writer to say that the patient said he wants the hospital to pay for the taxi ride. When DANIELE asked to speak to the patient to clarify the need for the hospital to pay for his ride, patient stated, 'Oh, I will just pay.' SW heard patient provide credit card to ,  thanked this writer for the help.    DANIELE has identified no other social service needs at this time. SW will remain available should other needs arise.      MIRTA Stern  Daytime (8:00am-4:30pm): [phone]  After-Hours SW Pager (4:30pm-11:30pm): [phone]         MIRTA Corbin

## 2021-02-09 ENCOUNTER — HOSPITAL ENCOUNTER (INPATIENT)
Facility: CLINIC | Age: 66
LOS: 4 days | Discharge: HOME HEALTH | DRG: 432 | End: 2021-02-13
Attending: EMERGENCY MEDICINE | Admitting: HOSPITALIST
Payer: MEDICARE

## 2021-02-09 ENCOUNTER — APPOINTMENT (OUTPATIENT)
Dept: ULTRASOUND IMAGING | Facility: CLINIC | Age: 66
DRG: 432 | End: 2021-02-09
Attending: HOSPITALIST
Payer: MEDICARE

## 2021-02-09 DIAGNOSIS — R10.84 ABDOMINAL PAIN, GENERALIZED: ICD-10-CM

## 2021-02-09 DIAGNOSIS — F10.920 ALCOHOL INTOXICATION, UNCOMPLICATED (H): ICD-10-CM

## 2021-02-09 DIAGNOSIS — Z76.89 HEALTH CARE HOME: Primary | ICD-10-CM

## 2021-02-09 DIAGNOSIS — K70.9 ALCOHOLIC LIVER DISEASE (H): ICD-10-CM

## 2021-02-09 LAB
ALBUMIN FLD-MCNC: 0.6 G/DL
ALBUMIN SERPL-MCNC: 2.7 G/DL (ref 3.4–5)
ALBUMIN SERPL-MCNC: 2.8 G/DL (ref 3.4–5)
ALP SERPL-CCNC: 273 U/L (ref 40–150)
ALT SERPL W P-5'-P-CCNC: 45 U/L (ref 0–70)
ANION GAP SERPL CALCULATED.3IONS-SCNC: 8 MMOL/L (ref 3–14)
AST SERPL W P-5'-P-CCNC: 101 U/L (ref 0–45)
BASOPHILS # BLD AUTO: 0.1 10E9/L (ref 0–0.2)
BASOPHILS NFR BLD AUTO: 1.7 %
BILIRUB SERPL-MCNC: 0.5 MG/DL (ref 0.2–1.3)
BUN SERPL-MCNC: 34 MG/DL (ref 7–30)
CALCIUM SERPL-MCNC: 7.7 MG/DL (ref 8.5–10.1)
CHLORIDE SERPL-SCNC: 111 MMOL/L (ref 94–109)
CO2 SERPL-SCNC: 23 MMOL/L (ref 20–32)
CREAT SERPL-MCNC: 1.63 MG/DL (ref 0.66–1.25)
DIFFERENTIAL METHOD BLD: ABNORMAL
EOSINOPHIL # BLD AUTO: 0.1 10E9/L (ref 0–0.7)
EOSINOPHIL NFR BLD AUTO: 2 %
ERYTHROCYTE [DISTWIDTH] IN BLOOD BY AUTOMATED COUNT: 16.9 % (ref 10–15)
ETHANOL SERPL-MCNC: 0.33 G/DL
GFR SERPL CREATININE-BSD FRML MDRD: 43 ML/MIN/{1.73_M2}
GLUCOSE SERPL-MCNC: 93 MG/DL (ref 70–99)
GRAM STN SPEC: NORMAL
HCT VFR BLD AUTO: 25.8 % (ref 40–53)
HGB BLD-MCNC: 8.5 G/DL (ref 13.3–17.7)
IMM GRANULOCYTES # BLD: 0 10E9/L (ref 0–0.4)
IMM GRANULOCYTES NFR BLD: 0.2 %
INR PPP: 1.16 (ref 0.86–1.14)
INTERPRETATION ECG - MUSE: NORMAL
LABORATORY COMMENT REPORT: NORMAL
LACTATE BLD-SCNC: 2.6 MMOL/L (ref 0.7–2)
LIPASE SERPL-CCNC: 799 U/L (ref 73–393)
LYMPHOCYTES # BLD AUTO: 0.7 10E9/L (ref 0.8–5.3)
LYMPHOCYTES NFR BLD AUTO: 17.1 %
MAGNESIUM SERPL-MCNC: 1.9 MG/DL (ref 1.6–2.3)
MCH RBC QN AUTO: 31.5 PG (ref 26.5–33)
MCHC RBC AUTO-ENTMCNC: 32.9 G/DL (ref 31.5–36.5)
MCV RBC AUTO: 96 FL (ref 78–100)
MONOCYTES # BLD AUTO: 0.4 10E9/L (ref 0–1.3)
MONOCYTES NFR BLD AUTO: 8.7 %
NEUTROPHILS # BLD AUTO: 2.8 10E9/L (ref 1.6–8.3)
NEUTROPHILS NFR BLD AUTO: 70.3 %
NRBC # BLD AUTO: 0 10*3/UL
NRBC BLD AUTO-RTO: 0 /100
PHOSPHATE SERPL-MCNC: 2.9 MG/DL (ref 2.5–4.5)
PLATELET # BLD AUTO: 122 10E9/L (ref 150–450)
POTASSIUM SERPL-SCNC: 4.2 MMOL/L (ref 3.4–5.3)
PROT FLD-MCNC: 1.2 G/DL
PROT SERPL-MCNC: 6.6 G/DL (ref 6.8–8.8)
RBC # BLD AUTO: 2.7 10E12/L (ref 4.4–5.9)
SARS-COV-2 RNA RESP QL NAA+PROBE: NEGATIVE
SODIUM SERPL-SCNC: 142 MMOL/L (ref 133–144)
SPECIMEN SOURCE FLD: NORMAL
SPECIMEN SOURCE FLD: NORMAL
SPECIMEN SOURCE: NORMAL
SPECIMEN SOURCE: NORMAL
TROPONIN I SERPL-MCNC: <0.015 UG/L (ref 0–0.04)
WBC # BLD AUTO: 4 10E9/L (ref 4–11)

## 2021-02-09 PROCEDURE — 83605 ASSAY OF LACTIC ACID: CPT | Performed by: EMERGENCY MEDICINE

## 2021-02-09 PROCEDURE — 87070 CULTURE OTHR SPECIMN AEROBIC: CPT | Performed by: HOSPITALIST

## 2021-02-09 PROCEDURE — 82040 ASSAY OF SERUM ALBUMIN: CPT | Performed by: HOSPITALIST

## 2021-02-09 PROCEDURE — 0W9G3ZZ DRAINAGE OF PERITONEAL CAVITY, PERCUTANEOUS APPROACH: ICD-10-PCS | Performed by: RADIOLOGY

## 2021-02-09 PROCEDURE — 85610 PROTHROMBIN TIME: CPT | Performed by: EMERGENCY MEDICINE

## 2021-02-09 PROCEDURE — 93005 ELECTROCARDIOGRAM TRACING: CPT

## 2021-02-09 PROCEDURE — 89051 BODY FLUID CELL COUNT: CPT | Performed by: HOSPITALIST

## 2021-02-09 PROCEDURE — 999N000154 HC STATISTIC RADIOLOGY XRAY, US, CT, MAR, NM

## 2021-02-09 PROCEDURE — 250N000013 HC RX MED GY IP 250 OP 250 PS 637: Performed by: PHYSICIAN ASSISTANT

## 2021-02-09 PROCEDURE — 83690 ASSAY OF LIPASE: CPT | Performed by: EMERGENCY MEDICINE

## 2021-02-09 PROCEDURE — 99207 PR CDG-CODE CATEGORY CHANGED: CPT | Performed by: HOSPITALIST

## 2021-02-09 PROCEDURE — 87635 SARS-COV-2 COVID-19 AMP PRB: CPT | Performed by: EMERGENCY MEDICINE

## 2021-02-09 PROCEDURE — 99223 1ST HOSP IP/OBS HIGH 75: CPT | Mod: AI | Performed by: HOSPITALIST

## 2021-02-09 PROCEDURE — 258N000003 HC RX IP 258 OP 636: Performed by: PHYSICIAN ASSISTANT

## 2021-02-09 PROCEDURE — 250N000011 HC RX IP 250 OP 636: Performed by: HOSPITALIST

## 2021-02-09 PROCEDURE — 84157 ASSAY OF PROTEIN OTHER: CPT | Performed by: HOSPITALIST

## 2021-02-09 PROCEDURE — 99285 EMERGENCY DEPT VISIT HI MDM: CPT | Mod: 25

## 2021-02-09 PROCEDURE — 49083 ABD PARACENTESIS W/IMAGING: CPT

## 2021-02-09 PROCEDURE — C9803 HOPD COVID-19 SPEC COLLECT: HCPCS

## 2021-02-09 PROCEDURE — 85025 COMPLETE CBC W/AUTO DIFF WBC: CPT | Performed by: EMERGENCY MEDICINE

## 2021-02-09 PROCEDURE — 82077 ASSAY SPEC XCP UR&BREATH IA: CPT | Performed by: EMERGENCY MEDICINE

## 2021-02-09 PROCEDURE — HZ2ZZZZ DETOXIFICATION SERVICES FOR SUBSTANCE ABUSE TREATMENT: ICD-10-PCS | Performed by: HOSPITALIST

## 2021-02-09 PROCEDURE — 36415 COLL VENOUS BLD VENIPUNCTURE: CPT | Performed by: HOSPITALIST

## 2021-02-09 PROCEDURE — 83735 ASSAY OF MAGNESIUM: CPT | Performed by: EMERGENCY MEDICINE

## 2021-02-09 PROCEDURE — 87205 SMEAR GRAM STAIN: CPT | Performed by: HOSPITALIST

## 2021-02-09 PROCEDURE — 84484 ASSAY OF TROPONIN QUANT: CPT | Performed by: EMERGENCY MEDICINE

## 2021-02-09 PROCEDURE — 84100 ASSAY OF PHOSPHORUS: CPT | Performed by: EMERGENCY MEDICINE

## 2021-02-09 PROCEDURE — 250N000013 HC RX MED GY IP 250 OP 250 PS 637: Performed by: HOSPITALIST

## 2021-02-09 PROCEDURE — 82042 OTHER SOURCE ALBUMIN QUAN EA: CPT | Performed by: HOSPITALIST

## 2021-02-09 PROCEDURE — 250N000011 HC RX IP 250 OP 636: Performed by: PHYSICIAN ASSISTANT

## 2021-02-09 PROCEDURE — 80053 COMPREHEN METABOLIC PANEL: CPT | Performed by: EMERGENCY MEDICINE

## 2021-02-09 PROCEDURE — 120N000001 HC R&B MED SURG/OB

## 2021-02-09 PROCEDURE — 87015 SPECIMEN INFECT AGNT CONCNTJ: CPT | Performed by: HOSPITALIST

## 2021-02-09 PROCEDURE — 99207 PR NO CHARGE LOS: CPT | Performed by: HOSPITALIST

## 2021-02-09 RX ORDER — MULTIPLE VITAMINS W/ MINERALS TAB 9MG-400MCG
1 TAB ORAL DAILY
Status: DISCONTINUED | OUTPATIENT
Start: 2021-02-09 | End: 2021-02-13 | Stop reason: HOSPADM

## 2021-02-09 RX ORDER — MIRTAZAPINE 15 MG/1
30 TABLET, FILM COATED ORAL AT BEDTIME
Status: DISCONTINUED | OUTPATIENT
Start: 2021-02-09 | End: 2021-02-13 | Stop reason: HOSPADM

## 2021-02-09 RX ORDER — ONDANSETRON 4 MG/1
4 TABLET, ORALLY DISINTEGRATING ORAL EVERY 6 HOURS PRN
Status: DISCONTINUED | OUTPATIENT
Start: 2021-02-09 | End: 2021-02-09

## 2021-02-09 RX ORDER — PROCHLORPERAZINE 25 MG
12.5 SUPPOSITORY, RECTAL RECTAL EVERY 12 HOURS PRN
Status: DISCONTINUED | OUTPATIENT
Start: 2021-02-09 | End: 2021-02-09

## 2021-02-09 RX ORDER — HYDROXYZINE HYDROCHLORIDE 25 MG/1
50 TABLET, FILM COATED ORAL
Status: DISCONTINUED | OUTPATIENT
Start: 2021-02-09 | End: 2021-02-13 | Stop reason: HOSPADM

## 2021-02-09 RX ORDER — SODIUM CHLORIDE, SODIUM LACTATE, POTASSIUM CHLORIDE, CALCIUM CHLORIDE 600; 310; 30; 20 MG/100ML; MG/100ML; MG/100ML; MG/100ML
INJECTION, SOLUTION INTRAVENOUS CONTINUOUS
Status: DISCONTINUED | OUTPATIENT
Start: 2021-02-09 | End: 2021-02-10

## 2021-02-09 RX ORDER — ATENOLOL 25 MG/1
25 TABLET ORAL DAILY
Status: DISCONTINUED | OUTPATIENT
Start: 2021-02-09 | End: 2021-02-13 | Stop reason: HOSPADM

## 2021-02-09 RX ORDER — PROCHLORPERAZINE MALEATE 5 MG
5 TABLET ORAL EVERY 6 HOURS PRN
Status: DISCONTINUED | OUTPATIENT
Start: 2021-02-09 | End: 2021-02-09

## 2021-02-09 RX ORDER — PANTOPRAZOLE SODIUM 40 MG/1
40 TABLET, DELAYED RELEASE ORAL 2 TIMES DAILY
Status: DISCONTINUED | OUTPATIENT
Start: 2021-02-09 | End: 2021-02-13 | Stop reason: HOSPADM

## 2021-02-09 RX ORDER — FLUMAZENIL 0.1 MG/ML
0.2 INJECTION, SOLUTION INTRAVENOUS
Status: DISCONTINUED | OUTPATIENT
Start: 2021-02-09 | End: 2021-02-13 | Stop reason: HOSPADM

## 2021-02-09 RX ORDER — AMOXICILLIN 250 MG
1 CAPSULE ORAL 2 TIMES DAILY PRN
Status: DISCONTINUED | OUTPATIENT
Start: 2021-02-09 | End: 2021-02-13 | Stop reason: HOSPADM

## 2021-02-09 RX ORDER — LANOLIN ALCOHOL/MO/W.PET/CERES
100 CREAM (GRAM) TOPICAL 3 TIMES DAILY
Status: DISCONTINUED | OUTPATIENT
Start: 2021-02-11 | End: 2021-02-13 | Stop reason: HOSPADM

## 2021-02-09 RX ORDER — ONDANSETRON 2 MG/ML
4 INJECTION INTRAMUSCULAR; INTRAVENOUS EVERY 6 HOURS PRN
Status: DISCONTINUED | OUTPATIENT
Start: 2021-02-09 | End: 2021-02-09

## 2021-02-09 RX ORDER — LANOLIN ALCOHOL/MO/W.PET/CERES
100 CREAM (GRAM) TOPICAL DAILY
Status: DISCONTINUED | OUTPATIENT
Start: 2021-02-16 | End: 2021-02-13 | Stop reason: HOSPADM

## 2021-02-09 RX ORDER — LIDOCAINE HYDROCHLORIDE 10 MG/ML
10 INJECTION, SOLUTION EPIDURAL; INFILTRATION; INTRACAUDAL; PERINEURAL ONCE
Status: COMPLETED | OUTPATIENT
Start: 2021-02-09 | End: 2021-02-09

## 2021-02-09 RX ORDER — QUETIAPINE FUMARATE 100 MG/1
100 TABLET, FILM COATED ORAL AT BEDTIME
Status: DISCONTINUED | OUTPATIENT
Start: 2021-02-09 | End: 2021-02-13 | Stop reason: HOSPADM

## 2021-02-09 RX ORDER — FOLIC ACID 1 MG/1
1 TABLET ORAL DAILY
Status: DISCONTINUED | OUTPATIENT
Start: 2021-02-09 | End: 2021-02-13 | Stop reason: HOSPADM

## 2021-02-09 RX ORDER — TAMSULOSIN HYDROCHLORIDE 0.4 MG/1
0.8 CAPSULE ORAL DAILY
Status: DISCONTINUED | OUTPATIENT
Start: 2021-02-09 | End: 2021-02-13 | Stop reason: HOSPADM

## 2021-02-09 RX ORDER — POLYETHYLENE GLYCOL 3350 17 G/17G
17 POWDER, FOR SOLUTION ORAL DAILY PRN
Status: DISCONTINUED | OUTPATIENT
Start: 2021-02-09 | End: 2021-02-13 | Stop reason: HOSPADM

## 2021-02-09 RX ORDER — BISACODYL 10 MG
10 SUPPOSITORY, RECTAL RECTAL DAILY PRN
Status: DISCONTINUED | OUTPATIENT
Start: 2021-02-09 | End: 2021-02-13 | Stop reason: HOSPADM

## 2021-02-09 RX ORDER — LORAZEPAM 1 MG/1
1-2 TABLET ORAL EVERY 30 MIN PRN
Status: DISCONTINUED | OUTPATIENT
Start: 2021-02-09 | End: 2021-02-13 | Stop reason: HOSPADM

## 2021-02-09 RX ORDER — AMOXICILLIN 250 MG
2 CAPSULE ORAL 2 TIMES DAILY PRN
Status: DISCONTINUED | OUTPATIENT
Start: 2021-02-09 | End: 2021-02-13 | Stop reason: HOSPADM

## 2021-02-09 RX ORDER — LANOLIN ALCOHOL/MO/W.PET/CERES
3 CREAM (GRAM) TOPICAL
Status: DISCONTINUED | OUTPATIENT
Start: 2021-02-09 | End: 2021-02-13 | Stop reason: HOSPADM

## 2021-02-09 RX ORDER — TRAZODONE HYDROCHLORIDE 100 MG/1
100 TABLET ORAL AT BEDTIME
Status: DISCONTINUED | OUTPATIENT
Start: 2021-02-09 | End: 2021-02-13 | Stop reason: HOSPADM

## 2021-02-09 RX ORDER — QUETIAPINE FUMARATE 25 MG/1
50 TABLET, FILM COATED ORAL 3 TIMES DAILY PRN
Status: DISCONTINUED | OUTPATIENT
Start: 2021-02-09 | End: 2021-02-13 | Stop reason: HOSPADM

## 2021-02-09 RX ORDER — LANOLIN ALCOHOL/MO/W.PET/CERES
200 CREAM (GRAM) TOPICAL 3 TIMES DAILY
Status: COMPLETED | OUTPATIENT
Start: 2021-02-09 | End: 2021-02-11

## 2021-02-09 RX ORDER — LIDOCAINE 40 MG/G
CREAM TOPICAL
Status: DISCONTINUED | OUTPATIENT
Start: 2021-02-09 | End: 2021-02-13 | Stop reason: HOSPADM

## 2021-02-09 RX ORDER — LORAZEPAM 2 MG/ML
1-2 INJECTION INTRAMUSCULAR EVERY 30 MIN PRN
Status: DISCONTINUED | OUTPATIENT
Start: 2021-02-09 | End: 2021-02-13 | Stop reason: HOSPADM

## 2021-02-09 RX ORDER — ALBUMIN (HUMAN) 12.5 G/50ML
12.5 SOLUTION INTRAVENOUS ONCE
Status: DISCONTINUED | OUTPATIENT
Start: 2021-02-09 | End: 2021-02-09

## 2021-02-09 RX ADMIN — SODIUM CHLORIDE, POTASSIUM CHLORIDE, SODIUM LACTATE AND CALCIUM CHLORIDE: 600; 310; 30; 20 INJECTION, SOLUTION INTRAVENOUS at 17:29

## 2021-02-09 RX ADMIN — CARBIDOPA AND LEVODOPA 7.5 MG: 50; 200 TABLET, EXTENDED RELEASE ORAL at 13:01

## 2021-02-09 RX ADMIN — MAGNESIUM SULFATE HEPTAHYDRATE 1 G: 500 INJECTION, SOLUTION INTRAMUSCULAR; INTRAVENOUS at 16:16

## 2021-02-09 RX ADMIN — PANTOPRAZOLE SODIUM 40 MG: 40 TABLET, DELAYED RELEASE ORAL at 20:29

## 2021-02-09 RX ADMIN — THIAMINE HCL TAB 100 MG 200 MG: 100 TAB at 22:14

## 2021-02-09 RX ADMIN — LORAZEPAM 1 MG: 1 TABLET ORAL at 17:33

## 2021-02-09 RX ADMIN — PANTOPRAZOLE SODIUM 40 MG: 40 TABLET, DELAYED RELEASE ORAL at 11:47

## 2021-02-09 RX ADMIN — ATENOLOL 25 MG: 25 TABLET ORAL at 11:46

## 2021-02-09 RX ADMIN — MELATONIN TAB 3 MG 3 MG: 3 TAB at 22:47

## 2021-02-09 RX ADMIN — FOLIC ACID 1 MG: 1 TABLET ORAL at 10:40

## 2021-02-09 RX ADMIN — THIAMINE HCL TAB 100 MG 200 MG: 100 TAB at 10:40

## 2021-02-09 RX ADMIN — VORTIOXETINE 10 MG: 10 TABLET, FILM COATED ORAL at 13:01

## 2021-02-09 RX ADMIN — LIDOCAINE HYDROCHLORIDE 10 ML: 10 INJECTION, SOLUTION EPIDURAL; INFILTRATION; INTRACAUDAL; PERINEURAL at 08:34

## 2021-02-09 RX ADMIN — QUETIAPINE 50 MG: 25 TABLET ORAL at 11:45

## 2021-02-09 RX ADMIN — ONDANSETRON 4 MG: 2 INJECTION INTRAMUSCULAR; INTRAVENOUS at 07:51

## 2021-02-09 RX ADMIN — QUETIAPINE FUMARATE 100 MG: 100 TABLET ORAL at 22:14

## 2021-02-09 RX ADMIN — LORAZEPAM 1 MG: 1 TABLET ORAL at 13:01

## 2021-02-09 RX ADMIN — THIAMINE HCL TAB 100 MG 200 MG: 100 TAB at 17:33

## 2021-02-09 RX ADMIN — MULTIPLE VITAMINS W/ MINERALS TAB 1 TABLET: TAB at 10:41

## 2021-02-09 RX ADMIN — TAMSULOSIN HYDROCHLORIDE 0.8 MG: 0.4 CAPSULE ORAL at 11:46

## 2021-02-09 RX ADMIN — CARBIDOPA AND LEVODOPA 7.5 MG: 50; 200 TABLET, EXTENDED RELEASE ORAL at 17:33

## 2021-02-09 ASSESSMENT — ENCOUNTER SYMPTOMS
ABDOMINAL DISTENTION: 1
FEVER: 0
DIARRHEA: 0
ABDOMINAL PAIN: 1
VOMITING: 0
NAUSEA: 0
COUGH: 0
WEAKNESS: 1

## 2021-02-09 ASSESSMENT — ACTIVITIES OF DAILY LIVING (ADL)
ADLS_ACUITY_SCORE: 17
ADLS_ACUITY_SCORE: 17

## 2021-02-09 ASSESSMENT — MIFFLIN-ST. JEOR: SCORE: 1577.78

## 2021-02-09 NOTE — ED PROVIDER NOTES
"  History     Chief Complaint:  Generalized Weakness and Alcohol Problem      HPI  Jim Richard is a 65 year old male with a history of alcoholism, alcoholic cirrhosis, CAD, MI, HTN, HLD, and COPD who presents to the emergency department via EMS for evaluation of generalized weakness and alcohol intoxication. Patient reports feeling unwell starting about 3 hours ago. When asked to describe his symptoms he states \"I just don't feel right.\" He admits to drinking 750 mL of vodka today, with his last drink about 3 hours ago. He does complain of some abdominal pain due to his hernia. He also feels short of breath but states this is chronic. He denies nausea, vomiting, diarrhea, cough, fever, and chest pain.      Allergies:  Amlodipine  Lisinopril    Medications:    Atenolol  Folic acid  Atarax  Midodrine  Mag-ox  Remeron  Protonix  Seroquel  Flomax  Trazodone  Trintellix    Past Medical History:    Alcoholism  Anxiety  CAD  Depression  Esophageal varices  MI  Hepatomegaly  HLD  JANIS on CPAP  HTN  SDH  PVD  Spinal stenosis  Substance abuse  NGUYEN  Thrombocytopenia  GI bleed  Alcoholic cirrhosis of liver with ascites  Chronic back pain  MIGUEL  COPD    Past Surgical History:    Appendectomy  Femoropopliteal bypass graft  Femoral endarterectomy  EGD x7  Hernia repair  Laminectomy, fusion lumbar 3+ levels  T&A    Family History:    CAD  Lung cancer  Substance abuse    Social History:  The patient was brought to the emergency department by EMS.  The patient lives with his dog.  Alcohol Use: Yes    Review of Systems   Constitutional: Negative for fever.   Respiratory: Negative for cough.    Cardiovascular: Negative for chest pain.   Gastrointestinal: Positive for abdominal distention and abdominal pain. Negative for diarrhea, nausea and vomiting.   Neurological: Positive for weakness.   All other systems reviewed and are negative.    Physical Exam     Patient Vitals for the past 24 hrs:   BP Temp Temp src Pulse Resp SpO2 " "Height Weight   02/09/21 0230 (!) 140/70 -- -- 98 18 93 % -- --   02/09/21 0223 -- -- -- -- 17 -- -- --   02/09/21 0219 (!) 142/79 99.4  F (37.4  C) Oral 95 (!) 95 94 % 1.702 m (5' 7\") 83.9 kg (185 lb)         Physical Exam  Nursing note and vitals reviewed.  Constitutional:  Oriented to person, place, and time. Cooperative.  Smells of alcohol.  HENT:   Nose:    Nose normal.   Mouth/Throat:   Face covering in place.  Eyes:    Conjunctivae normal and EOM are normal.      Pupils are equal, round, and reactive to light.   Neck:    Trachea normal.   Cardiovascular:  Normal rate, regular rhythm, normal heart sounds and normal pulses. No murmur heard.  Pulmonary/Chest:  Effort normal and breath sounds normal.   Abdominal:   Somewhat distended but soft and otherwise normal appearance with bowel sounds are normal.      There is diffuse tenderness to palpation.      There is no rebound and no CVA tenderness.   Musculoskeletal:  Extremities atraumatic x 4.   Lymphadenopathy:  No cervical adenopathy.   Neurological:   Alert and oriented to person, place, and time. Normal strength.      No cranial nerve deficit or sensory deficit. GCS eye subscore is 4. GCS verbal subscore is 5. GCS motor subscore is 6.   Skin:    Skin is intact. No rash noted.   Psychiatric:   Somewhat intoxicated but otherwise normal mood and affect.        Emergency Department Course   ECG  ECG taken at 0233, ECG read at 0234  Normal sinus rhythm. RBBB. Abnormal ECG.  No as compared to prior, dated 1/22/21.  Rate 98 bpm. AZ interval 146 ms. QRS duration 138 ms. QT/QTc 398/508 ms. P-R-T axes 46 -27 40.     Laboratory:  CBC: WBC: 4.0, HGB: 8.5 (L), PLT: 122 (L)  CMP: Glucose 93, Chloride: 111 (H), Urea Nitrogen: 34 (H), GFR: 43 (L), Calcium: 7.7 (L), Albumin: 2.8 (L), Protein Total: 6.6 (L), Alkaline Phosphatase: 273 (H), AST: 101 (H), o/w WNL (Creatinine: 1.63 (H))  Troponin (223): <0.015  Lactic acid (224): 2.6 (H)  Lipase: 799 (H)  INR: 1.16 (H)  Magnesium: " 1.9  Phosphorus: 2.9  Alcohol ethyl: 0.33 (HH)  Asymptomatic COVID-19 PCR: Pending     Emergency Department Course:  Reviewed:  I reviewed the patient's nursing notes, vitals, past medical records, Care Everywhere.     Assessments:  228 I assessed the patient. Exam findings described above.    318 I reassessed the patient and discussed the results of his workup.    Consults:   330 I spoke with Dr. Flores of the hospitalist services, who is in agreement to accept the patient for admission for further monitoring, evaluation, and treatment. Findings and plan explained to the Patient who consents to admission.     Disposition:  Admitted to the hospital.    Impression & Plan    Medical Decision Making:  This is a 65-year-old male with history of alcoholism and alcoholic cirrhosis, who came in by ambulance for further evaluation of not feeling well.  He was not able to fully articulate how he was not feeling well, but my suspicion was that it would be secondary to his alcohol intake as well as his chronic liver disease.  I also considered the possibility of pancreatitis as well as SBP, given his abdominal pain.  He did not appear to be significantly distended though to the point of needing emergent paracentesis.  His blood work is very similar to what has been found previously.  He prefers to come into the hospital for further evaluation and management as well as for paracentesis.  I think it is reasonable to bring him in at least for observation at this point.  I subsequently spoke with Dr. Flores, who will be taking care of him.    Covid-19  Jim Richard was evaluated during a global COVID-19 pandemic, which necessitated consideration that the patient might be at risk for infection with the SARS-CoV-2 virus that causes COVID-19.   Applicable protocols for evaluation were followed during the patient's care.   COVID-19 was considered as part of the patient's evaluation. The plan for testing is:  a test was  obtained during this visit.    Diagnosis:    ICD-10-CM    1. Alcohol intoxication, uncomplicated (H)  F10.920    2. Abdominal pain, generalized  R10.84    3. Alcoholic liver disease (H)  K70.9        Disposition:  Admitted to the [facility]    Nicola Tsai  2/9/2021   EMERGENCY DEPARTMENT  Scribe Disclosure:  I, Nicolatristan Tsai, am serving as a scribe at 2:28 AM on 2/9/2021 to document services personally performed by Edwin Colvin MD based on my observations and the provider's statements to me.          Edwin Colvin MD  02/09/21 0356

## 2021-02-09 NOTE — PROGRESS NOTES
RECEIVING UNIT ED HANDOFF REVIEW    ED Nurse Handoff Report was reviewed by: Mariya Hoover RN on February 9, 2021 at 4:35 AM

## 2021-02-09 NOTE — ED NOTES
DATE:  2/9/2021   TIME OF RECEIPT FROM LAB: 0306  LAB TEST:  ETOH  LAB VALUE:  0.33  RESULTS GIVEN WITH READ-BACK TO (PROVIDER):  Edwin Colvin MD  TIME LAB VALUE REPORTED TO PROVIDER:   0300

## 2021-02-09 NOTE — PROGRESS NOTES
Care Suites Procedure Nursing Note    Patient Information  Name: Jim Richard  Age: 65 year old    Procedure  Procedure: Paracentesis by Dr Pereira with 3350 ml of lemon yellow returns.  Pt tolerated very well.  See flow sheet  Procedure start time: 0834  Procedure complete time: 0851  Concerns/abnormal assessment: none  If abnormal assessment, provider notified: N/A  Plan/Other: To floor by cart transport.  Report called to floor VASQUEZ [facility].    Sully Garcia RN

## 2021-02-09 NOTE — PHARMACY-ADMISSION MEDICATION HISTORY
Pharmacy Medication History  Admission medication history interview status for the 2/9/2021  admission is complete. See EPIC admission navigator for prior to admission medications     Location of Interview: Outside patient room but on unit  Medication history sources: Patient, Surescripts and Care Everywhere, recent hospital admission    Medication reconciliation completed by provider prior to medication history? No    Time spent in this activity: 8 minutes    Prior to Admission medications    Medication Sig Last Dose Taking? Auth Provider   acetaminophen (TYLENOL) 325 MG tablet Take 2 tablets (650 mg) by mouth every 4 hours as needed for mild pain Past Month at Unknown time Yes Jeffrey Villagomez MD   atenolol (TENORMIN) 25 MG tablet Take 25 mg by mouth daily Filled 9/9/20 #30 2/8/2021 at Unknown time Yes Unknown, Entered By History   fluticasone-vilanterol (BREO ELLIPTA) 200-25 MCG/INH inhaler Inhale 1 puff into the lungs daily as needed (SOB) Past Month at Unknown time Yes Jeffrey Villagomez MD   folic acid (FOLVITE) 1 MG tablet Take 1 tablet (1 mg) by mouth daily  Patient taking differently: Take 1 mg by mouth daily Last filled 11/21/20 #30 Past Week at Unknown time Yes Jeffrey Villagomez MD   hydrOXYzine (ATARAX) 50 MG tablet Take 1 tablet (50 mg) by mouth nightly as needed for other (sleep)  Patient taking differently: Take 50 mg by mouth nightly as needed for other (sleep) Filled 1/11/21 #90 Past Week at Unknown time Yes Jeffrey Villagomez MD   magnesium oxide (MAG-OX) 400 MG tablet Take 1 tablet (400 mg) by mouth daily  Patient taking differently: Take 400 mg by mouth daily Filled 11/21/20 #30 Past Week at Unknown time Yes Jeffrey Villagomez MD   midodrine (PROAMATINE) 2.5 MG tablet Take 3 tablets (7.5 mg) by mouth 3 times daily (with meals) 2/8/2021 at Unknown time Yes Sara Orellana MD   mirtazapine (REMERON) 30 MG tablet Take 30 mg by mouth At Bedtime Filled 1/11/21 for #30 Past  Week at Unknown time Yes Unknown, Entered By History   multivitamin w/minerals (THERA-VIT-M) tablet Take 1 tablet by mouth daily  Patient taking differently: Take 1 tablet by mouth daily Filled 9/21/21 #30 Past Month at Unknown time Yes Jeffrey Villagomez MD   nicotine (COMMIT) 2 MG lozenge Place 1 lozenge (2 mg) inside cheek every hour as needed for smoking cessation  Patient taking differently: Place 2 mg inside cheek every hour as needed for smoking cessation Filled 11/21/20 #30 Past Month at Unknown time Yes Jeffrey Villagomez MD   nicotine (NICODERM CQ) 21 MG/24HR 24 hr patch Place 1 patch onto the skin daily  Patient taking differently: Place 1 patch onto the skin daily Filled 11/20/21 #30 Past Month at Unknown time Yes Jeffrey Villagomez MD   pantoprazole (PROTONIX) 40 MG EC tablet Take 1 tablet (40 mg) by mouth 2 times daily 2/8/2021 at Unknown time Yes Sara Orellana MD   QUEtiapine (SEROQUEL) 100 MG tablet Take 100 mg by mouth At Bedtime Filled 1/11/21 #30 Past Week at Unknown time Yes Unknown, Entered By History   QUEtiapine (SEROQUEL) 50 MG tablet Take 1 tablet (50 mg) by mouth 3 times daily as needed (anxiety) Filled 11/20/20 #30 Past Month at Unknown time Yes Sara Orellana MD   tamsulosin (FLOMAX) 0.4 MG capsule Take 2 capsules (0.8 mg) by mouth daily Past Week at Unknown time Yes Sara Orellana MD   traZODone (DESYREL) 100 MG tablet Take 100 mg by mouth At Bedtime Filled 1/11/21 #30 Past Week at Unknown time Yes Unknown, Entered By History   vortioxetine (TRINTELLIX) 10 MG tablet Take 1 tablet (10 mg) by mouth daily  Patient taking differently: Take 10 mg by mouth daily Filled 11/20/20 #30 2/8/2021 at Unknown time Yes Jeffrey Villagomez MD       The information provided in this note is only as accurate as the sources available at the time of update(s)

## 2021-02-09 NOTE — ED NOTES
"[facility]  ED Nurse Handoff Report    ED Chief complaint: Generalized Weakness and Alcohol Problem      ED Diagnosis:   Final diagnoses:   None       Code Status: Full Code    Allergies:   Allergies   Allergen Reactions     Amlodipine Swelling     Lisinopril      Other reaction(s): Angioedema  Mouth and tongue swelling   Mouth and tongue swelling          Patient Story:   Pt presented to ED from home where he resides alone. Pt called 911 as he was not feeling well at home. Pt reports drinking 750ml of Vodka tonight, which is what he usually drinks. Distended abdomen, pt reports that it is r/t asciatis. Alert and oriented x4. Calm and cooperative.      Focused Assessment:    Generalized weakness.  Abdominal pain; abdominal distention and tenderness with palpation.  Intermittent nausea without emesis.    Treatments and/or interventions provided:   None    Patient's response to treatments and/or interventions:   N/A.    To be done/followed up on inpatient unit:    Continue with plan of care.    Does this patient have any cognitive concerns?: None    Activity level - Baseline/Home:  Independent  Activity Level - Current:   Independent    Patient's Preferred language: English   Needed?: No    Isolation: None  Infection: Not Applicable  Patient tested for COVID 19 prior to admission: YES  Bariatric?: No    Vital Signs:   Vitals:    02/09/21 0219 02/09/21 0223 02/09/21 0230   BP: (!) 142/79  (!) 140/70   Pulse: 95  98   Resp: (!) 95 17 18   Temp: 99.4  F (37.4  C)     TempSrc: Oral     SpO2: 94%  93%   Weight: 83.9 kg (185 lb)     Height: 1.702 m (5' 7\")         Cardiac Rhythm:  Sinus Rhythm.    Was the PSS-3 completed:   Yes  What interventions are required if any?  N/A   Family Comments: None in ED.  OBS brochure/video discussed/provided to patient/family: Yes              Name of person given brochure if not patient: n/a              Relationship to patient: n/a    For the majority of the " shift this patient's behavior was Green.   Behavioral interventions performed were none.    ED NURSE PHONE NUMBER: [phone]

## 2021-02-09 NOTE — CONSULTS
"CLINICAL NUTRITION SERVICES  -  ASSESSMENT NOTE    Recommendations Ordered by Registered Dietitian (RD):   - Chocolate shake BID between meals   - Continue micronutrients as ordered    Malnutrition:   % Weight Loss:  None noted  % Intake:  </= 75% for >/= 1 month (severe malnutrition)  Subcutaneous Fat Loss:  Orbital region mild depletion and Upper arm region moderate depletion  Muscle Loss:  Temporal region mild depletion and Clavicle bone region mild depletion  Fluid Retention:  Moderate     Malnutrition Diagnosis: Severe malnutrition  In Context of:  Chronic illness or disease  Environmental or social circumstances     REASON FOR ASSESSMENT  Jim Richard is a 65 year old male seen by Registered Dietitian for Provider Order - \"alcohol abuse. poor nutritional status\"    NUTRITION HISTORY  - Information obtained from chart review and patient report.   - H/o alcoholic cirrhosis with ongoing alcohol use, HTN, CKD, COPD with ongoing tobacco use, recent GIB, and depression/anxiety  - Presented with abdominal distention and acute encephalopathy.     - Known from prior admissions. Typically eats well during admissions.   - Jim states he was eating poorly while out of the hospital. He may eat 1 meal/day at most. [employer], [employer] \"fast food\".   - NKFA      CURRENT NUTRITION ORDERS  Diet Order:     2000 mg Sodium     Current Intake/Tolerance:  Tolerated eggs for breakfast, waiting on some soup. Would like a chocolate shake this afternoon, and scheduled BID between meals for duration of admission.     NUTRITION FOCUSED PHYSICAL ASSESSMENT FOR DIAGNOSING MALNUTRITION)  Yes             Observed:    Muscle wasting (refer to documentation in Malnutrition section) and Subcutaneous fat loss (refer to documentation in Malnutrition section)    Obtained from Chart/Interdisciplinary Team:  Last BM 2/8  Rex - Nutrition 2; total 19    ANTHROPOMETRICS  Height: 5' 7\"  Weight: 185 lbs 0 oz (83.9 kg)   Body mass index is 28.98 " kg/m .  Weight Status:  Overweight BMI 25-29.9  IBW: 67.3 kg   % IBW: 125%  Weight History: Wt appears relatively stable. Pt unable to provide insight. Last rD assessment in Oct 2020 pt admitted with a weight of 80.9 kg.   Wt Readings from Last 10 Encounters:   02/09/21 83.9 kg (185 lb)   02/03/21 86.2 kg (190 lb)   01/26/21 82.9 kg (182 lb 12.2 oz)   01/15/21 88.8 kg (195 lb 11.2 oz)   01/05/21 86.2 kg (190 lb)   12/02/20 81.6 kg (180 lb)   11/18/20 85.7 kg (189 lb)   10/11/20 80.9 kg (178 lb 5.6 oz)   10/05/20 79.4 kg (175 lb)   08/20/20 79.4 kg (175 lb)       LABS  Labs reviewed    Electrolytes  Potassium (mmol/L)   Date Value   02/09/2021 4.2   01/27/2021 3.9   01/26/2021 3.7     Phosphorus (mg/dL)   Date Value   02/09/2021 2.9   01/26/2021 2.4 (L)   01/22/2021 3.2   10/29/2020 4.3   10/25/2020 4.6 (H)    Blood Glucose  Glucose (mg/dL)   Date Value   02/09/2021 93   01/27/2021 117 (H)   01/26/2021 143 (H)   01/25/2021 132 (H)   01/24/2021 195 (H)     Hemoglobin A1C (%)   Date Value   03/22/2018 Canceled, Test credited   06/12/2012 5.2   03/08/2012 5.1    Inflammatory Markers  WBC (10e9/L)   Date Value   02/09/2021 4.0   01/27/2021 2.9 (L)   01/26/2021 2.9 (L)     Albumin (g/dL)   Date Value   02/09/2021 2.7 (L)   02/09/2021 2.8 (L)   01/26/2021 2.6 (L)      Magnesium (mg/dL)   Date Value   02/09/2021 1.9   01/27/2021 1.8   01/26/2021 1.8     Sodium (mmol/L)   Date Value   02/09/2021 142   01/27/2021 135   01/26/2021 134    Renal  Urea Nitrogen (mg/dL)   Date Value   02/09/2021 34 (H)   01/27/2021 29   01/26/2021 30     Creatinine (mg/dL)   Date Value   02/09/2021 1.63 (H)   01/27/2021 1.73 (H)   01/26/2021 1.70 (H)     Additional  Triglycerides (mg/dL)   Date Value   08/13/2019 140   06/12/2012 106   03/08/2012 131     Ketones Urine (mg/dL)   Date Value   10/06/2020 5 (A)           MEDICATIONS  Medications reviewed  Folic Acid 1 mg, Thiamine, thera vit M daily per Etoh protocol   Remeron 30 mg q HS    ASSESSED  NUTRITION NEEDS PER APPROVED PRACTICE GUIDELINES:  Dosing Weight 71 kg - adjusted   Estimated Energy Needs: 8071-4379 kcals (25-30 Kcal/Kg)  Justification: maintenance  Estimated Protein Needs:  grams protein (1.2-1.5 g pro/Kg)  Justification: Repletion and preservation of lean body mass  Estimated Fluid Needs: 1 mL/kcal  Justification: maintenance    MALNUTRITION:  % Weight Loss:  None noted  % Intake:  </= 75% for >/= 1 month (severe malnutrition)  Subcutaneous Fat Loss:  Orbital region mild depletion and Upper arm region moderate depletion  Muscle Loss:  Temporal region mild depletion and Clavicle bone region mild depletion  Fluid Retention:  Moderate     Malnutrition Diagnosis: Severe malnutrition  In Context of:  Chronic illness or disease  Environmental or social circumstances    NUTRITION DIAGNOSIS:  Inadequate oral intake related to nutrient displacement by excessive alcohol consumption as evidenced by patient reports eating 1 meal/day when out of the hospital.       NUTRITION INTERVENTIONS  Recommendations / Nutrition Prescription  2g Na diet   Chocolate shake BID between meals   Continue micronutrients as ordered       Implementation  Nutrition education: Per Provider order if indicated   Medical Food Supplement: see above      Nutrition Goals  Intake of at least 50% meals TID + 2 supplements daily.       MONITORING AND EVALUATION:  Progress towards goals will be monitored and evaluated per protocol and Practice Guidelines    Summer Isaac RD, LD  [facility] [place], 65, 54, [facility]   Pager: [phone]  Weekend Pager: [phone]

## 2021-02-09 NOTE — ED NOTES
Pt oxygen saturations drops down to 85-88% on room air. Pt has CPAP @ home so  2 L of O2 nasal cannula  applied. Pt appears tolerating well. sats in 95-97%

## 2021-02-09 NOTE — PROGRESS NOTES
DATE & TIME: 02/09/21 5159-7040   Cognitive Concerns/ Orientation : Alert/Oriented x 4   BEHAVIOR & AGGRESSION TOOL COLOR: Green  CIWA SCORE: 5 (headache, tremor)   ABNL VS/O2: /88, temp 99.1 otherwise stable, 2 LPM oxygen via nasal cannula with O2 sats (99%)  MOBILITY: Assist-1 gait belt, fall risk  PAIN MANAGMENT: Declines intervention  DIET: Clear Liquid; 2 gram sodium  BOWEL/BLADDER: Intermittent catheterization for retention >300  ABNL LAB/BG: ETOH 0.33; BUN 34; Creat 1.63; GFR 43; Alb 2.8; Pro 6.6; Alk phos 273; ; Lactic acid 2.6; Lipase 799; Hgb 8.5; hematocrit 25.8; platelet 122; INR 1.16  DRAIN/DEVICES: R PIV saline locked  TELEMETRY RHYTHM: n/a  SKIN: Bruising, blotchy skin on arms; stomach very rounded/taut; hernia at umbilicus  TESTS/PROCEDURES: paracentesis for 2/9  D/C DAY/GOALS/PLACE: pending  OTHER IMPORTANT INFO: Last drink was 02/08 at approximately 2300; Lives at home with dog

## 2021-02-09 NOTE — PROGRESS NOTES
RADIOLOGY PROCEDURE NOTE  Patient name: Jim Richard  MRN: [id number]  : 1955    Pre-procedure diagnosis: Ascites  Post-procedure diagnosis: Same    Procedure Date/Time: 2021  9:14 AM  Procedure: Paracentesis  Estimated blood loss: None  Specimen(s) collected with description: Ascites.  The patient tolerated the procedure well with no immediate complications.    See imaging dictation for procedural details and findings.    Provider name: Sebastien Pereira MD  Assistant(s):None

## 2021-02-09 NOTE — H&P
[facility]    History and Physical  Hospitalist       Date of Admission:  2/9/2021  Date of Service (when I saw the patient): 02/09/21    ASSESSMENT  Jim Richard is a 65 year old gentleman with extensive past medical history that is most notable for cirrhosis due to chronic active alcohol use disorder, as well as JANIS, COPD, chronic depression, BPH with urinary retention, and CKD 3, among others; who presents with abdominal distension and is found to have recurrent large volume ascites due to cirrhosis as well as acute toxic encephalopathy due to alcohol intoxication.    PLAN    Recurrent large volume ascites due to cirrhosis: Note is made of recurrent ascites requiring paracenteses every 2 weeks; the last time was 2/3/2021 as an outpatient (2.4 L removed). He is not on diuretics due to CKD reportedly. He presents for abdominal distension, without other signs of SBP. His Lipase is mildly elevated but not to a degree consistent with acute pancreatitis. He continues to drink alcohol.    -- Observation. US guided large volume paracentesis ordered. Will obtain fluid gram stain and cultures. Consider disposition planning afterward (possibly could discharge home), unless he develops ETOH withdrawal in the interim.    -- For any fever or signs of impending infectious process, would get STAT blood cultures and start empiric Ceftriaxone or Zosyn    Acute toxic encephalopathy due to alcohol intoxication: Causing mild ongoing alcoholic hepatitis.     -- He is at high risk for withdrawal though no signs seen thus far. CIWA with Ativan ordered as well as Thiamine, Folate, MVI ordered.    Chronic anemia and thrombocytopenia: Note is made of prior banding of esophageal varices in 2018. Most recently he was hospitalized here from 1/22/2021 to 1/27/2021 for alcohol withdrawal and melena causing acute blood loss anemia (HGB fabiola 7.5). [facility] GI was consulted and repeat EGD showed esophagitis, grade I  esophageal varices, portal hypertensive gastropathy, and erythematous duodenopathy, but no active bleeding.     -- Close follow up with GI at discharge to consider TIPS    CKD Stage 3-4: Cr 1.63, and was 1.73 on 1/27/2021. Avoid NSAID's while hospitalized. Monitor renal function closely while hospitalized.     Lactic acidosis, acute: Likely due to alcoholic ketoacidosis. Monitor as needed    BPH with urinary retention: Resume Flomax when verified; intermittent prn straight cath ordered    Essential hypertension  - Resume Atenolol when verified     COPD  - Resume home inhalers when verified      JANIS  - ContinueCPAP     Chronic depression and anxiety: He declines Psychiatry consult for now. He says he has an appointment with his Psychiatrist in 2 days.  - Resume vortioxetine, mirtazapine, and trazodone, and seroquel when verified    Rule Out COVID-19 infection  This patient was evaluated during a global COVID-19 pandemic, which necessitated consideration that the patient might be at risk for infection with the SARS-CoV-2 virus that causes COVID-19. Applicable protocols for evaluation were followed during the patient's care. Low suspicion for infection.   -follow up COVID-19 PCR test result  -no current indication for precautions    Chief Complaint   Abdominal distension    History is obtained from the patient and the ED physician whom I have spoken with    History of Present Illness   Jim Richard is a 65 year old gentleman who presents with progressive abdominal distension over the past week, beginning soon after his last paracentesis 2/3/2021 and worsening to the point tonight that his abdomen has become large and uncomfortable, with some sharp non-radiating pain located around his linda-umbilical hernia site. He also has had associated insomnia and racing thoughts over the past several days. He feels generally unwell. He has been drinking vodka daily, today around 750 ml per his report. He otherwise denies  "suicidal thoughts or ideation, or nausea, melena, hematochezia, fever, or other acute complaints.    In the ED, Blood pressure 133/66, pulse 100, temperature 99.4  F (37.4  C), temperature source Oral, resp. rate 12, height 1.702 m (5' 7\"), weight 83.9 kg (185 lb), SpO2 92 %.    Ethanol level was 0.33.     CBC and CMP were notable for HGB 8.5, , Cl 111, BUN 34, Cr 1.63, Ca 7.7, alb 2.8, tprot 6.6, aphos 273, , otherwise were within the normal reference range (WBC 4, Tbili 0.5). Lactate 2.6. INR 1.16.  Lipase was 799. Troponin negative. EKG showed NSR with RBBB.    PHYSICAL EXAM  Blood pressure (!) 140/70, pulse 98, temperature 99.4  F (37.4  C), temperature source Oral, resp. rate 18, height 1.702 m (5' 7\"), weight 83.9 kg (185 lb), SpO2 93 %.  Constitutional: Alert and oriented to person, place and time; appears uncomfortable  HEENT: normocephalic moist mucus membranes  Respiratory: lungs clear to auscultation bilaterally though sounds are faint  Cardiovascular: regular S1 S2   GI: abdomen soft and markedly distended, tender around umbilical hernia though the hernia is easily reducible manually; bowel sounds positive  Lymph/Hematologic: pale, no cervical lymphadenopathy  Skin: no rash, good turgor  Musculoskeletal: trace bilateral LE  edema  Neurologic: extra-ocular muscles intact; moves all four extremities  Psychiatric: flattened affect, speech non-tangential     DVT Prophylaxis: Pneumatic Compression Devices  Code Status: Full Code    Disposition: Expected discharge in 0-2 days    Rufino Flores MD    Past Medical History    I have reviewed this patient's medical history and updated it with pertinent information if needed.   Past Medical History:   Diagnosis Date     Alcoholism (H) 1/14/2013    had treatment at [facility]     Anxiety      Coronary artery disease 09/09/2014    Nuclear stress test with slight fixed defect inferiorly, no ischemia     Depression     w/anxiety     Esophageal " varices with bleeding 04/28/2018    6 varices banded on EGD     Heart attack (H)      Hepatomegaly     Fatty liver, chronic alcoholic     Hyperlipemia      Hypertension      JANIS on CPAP      Peripheral vascular disease (H)      PVD (peripheral vascular disease) (H)      SDH (subdural hematoma) (H) 04/26/2018    Right side, resolved spontaneously     Spinal stenosis      Substance abuse (H)        Past Surgical History   I have reviewed this patient's surgical history and updated it with pertinent information if needed.  Past Surgical History:   Procedure Laterality Date     APPENDECTOMY       BYPASS GRAFT FEMOROPOPLITEAL  6/12/2012    Procedure: BYPASS GRAFT FEMOROPOPLITEAL;  LEFT FEMORAL TO ABOVE KNEE POPLITEAL BYPASS, ENDARTERECTOMY OF SFA AND PF ARTERIES, LEFT EXTERNAL ILIAC TO COMMON FEMORAL INTERPOSITION GRAFT;  Surgeon: Damir Roberts MD;  Location:  OR     COLONOSCOPY       COLONOSCOPY N/A 8/8/2019    Procedure: COLONOSCOPY;  Surgeon: Pavan Arguello MD;  Location:  GI     COLONOSCOPY N/A 3/10/2020    Procedure: COLONOSCOPY;  Surgeon: Rosendo Shaw MD;  Location:  GI     ENDARTERECTOMY FEMORAL  6/12/2012    Procedure: ENDARTERECTOMY FEMORAL;;  Surgeon: Damir Roberts MD;  Location:  OR     ESOPHAGOSCOPY, GASTROSCOPY, DUODENOSCOPY (EGD), COMBINED N/A 3/22/2018    Procedure: COMBINED ESOPHAGOSCOPY, GASTROSCOPY, DUODENOSCOPY (EGD);  ESOPHAGOSCOPY, GASTROSCOPY, DUODENOSOCPY.;  Surgeon: Valeri Pruitt MD;  Location:  OR     ESOPHAGOSCOPY, GASTROSCOPY, DUODENOSCOPY (EGD), COMBINED N/A 9/29/2018    Procedure: COMBINED ESOPHAGOSCOPY, GASTROSCOPY, DUODENOSCOPY (EGD);;  Surgeon: Rosendo Shaw MD;  Location:  GI     ESOPHAGOSCOPY, GASTROSCOPY, DUODENOSCOPY (EGD), COMBINED N/A 8/2/2019    Procedure: ESOPHAGOGASTRODUODENOSCOPY (EGD);  Surgeon: Pavan Arguello MD;  Location:  GI     ESOPHAGOSCOPY, GASTROSCOPY, DUODENOSCOPY (EGD), COMBINED N/A 9/25/2019    Procedure:  ESOPHAGOGASTRODUODENOSCOPY (EGD);  Surgeon: Pavan Arguello MD;  Location:  GI     ESOPHAGOSCOPY, GASTROSCOPY, DUODENOSCOPY (EGD), COMBINED N/A 3/8/2020    Procedure: ESOPHAGOGASTRODUODENOSCOPY (EGD);  Surgeon: Rosendo Shaw MD;  Location:  GI     ESOPHAGOSCOPY, GASTROSCOPY, DUODENOSCOPY (EGD), COMBINED N/A 6/11/2020    Procedure: ESOPHAGOGASTRODUODENOSCOPY (EGD);  Surgeon: Rosendo Shaw MD;  Location:  GI     ESOPHAGOSCOPY, GASTROSCOPY, DUODENOSCOPY (EGD), COMBINED N/A 1/23/2021    Procedure: ESOPHAGOGASTRODUODENOSCOPY (EGD);  Surgeon: Pavan Arguello MD;  Location:  GI     HERNIA REPAIR  2017    Abdominal     LAMINECTOMY, FUSION LUMBAR THREE+ LEVEL, COMBINED N/A 9/22/2014    Procedure: COMBINED LAMINECTOMY, FUSION LUMBAR THREE+ LEVEL;  Surgeon: Sebastien Pruitt MD;  Location:  OR     TONSILLECTOMY & ADENOIDECTOMY         Prior to Admission Medications   Prior to Admission Medications   Prescriptions Last Dose Informant Patient Reported? Taking?   QUEtiapine (SEROQUEL) 100 MG tablet   Yes No   Sig: Take 100 mg by mouth At Bedtime Filled 1/11/21 #30   QUEtiapine (SEROQUEL) 50 MG tablet   No No   Sig: Take 1 tablet (50 mg) by mouth 3 times daily as needed (anxiety) Filled 11/20/20 #30   acetaminophen (TYLENOL) 325 MG tablet   No No   Sig: Take 2 tablets (650 mg) by mouth every 4 hours as needed for mild pain   atenolol (TENORMIN) 25 MG tablet   Yes No   Sig: Take 25 mg by mouth daily Filled 9/9/20 #30   fluticasone-vilanterol (BREO ELLIPTA) 200-25 MCG/INH inhaler   No No   Sig: Inhale 1 puff into the lungs daily as needed (SOB)   Patient taking differently: Inhale 1 puff into the lungs daily as needed (SOB) 8/27/20 last filled   folic acid (FOLVITE) 1 MG tablet   No No   Sig: Take 1 tablet (1 mg) by mouth daily   Patient taking differently: Take 1 mg by mouth daily Last filled 11/21/20 #30   hydrOXYzine (ATARAX) 50 MG tablet   No No   Sig: Take 1 tablet (50 mg) by mouth nightly as needed  for other (sleep)   Patient taking differently: Take 50 mg by mouth nightly as needed for other (sleep) Filled 1/11/21 #90   magnesium oxide (MAG-OX) 400 MG tablet   No No   Sig: Take 1 tablet (400 mg) by mouth daily   Patient taking differently: Take 400 mg by mouth daily Filled 11/21/20 #30   midodrine (PROAMATINE) 2.5 MG tablet   No No   Sig: Take 3 tablets (7.5 mg) by mouth 3 times daily (with meals)   mirtazapine (REMERON) 30 MG tablet   Yes No   Sig: Take 30 mg by mouth At Bedtime Filled 1/11/21 for #30   multivitamin w/minerals (THERA-VIT-M) tablet   No No   Sig: Take 1 tablet by mouth daily   Patient taking differently: Take 1 tablet by mouth daily Filled 9/21/21 #30   nicotine (COMMIT) 2 MG lozenge   No No   Sig: Place 1 lozenge (2 mg) inside cheek every hour as needed for smoking cessation   Patient taking differently: Place 2 mg inside cheek every hour as needed for smoking cessation Filled 11/21/20 #30   nicotine (NICODERM CQ) 21 MG/24HR 24 hr patch   No No   Sig: Place 1 patch onto the skin daily   Patient taking differently: Place 1 patch onto the skin daily Filled 11/20/21 #30   pantoprazole (PROTONIX) 40 MG EC tablet   No No   Sig: Take 1 tablet (40 mg) by mouth 2 times daily   tamsulosin (FLOMAX) 0.4 MG capsule   No No   Sig: Take 2 capsules (0.8 mg) by mouth daily   traZODone (DESYREL) 100 MG tablet   Yes No   Sig: Take 100 mg by mouth At Bedtime Filled 1/11/21 #30   vortioxetine (TRINTELLIX) 10 MG tablet   No No   Sig: Take 1 tablet (10 mg) by mouth daily   Patient taking differently: Take 10 mg by mouth daily Filled 11/20/20 #30      Facility-Administered Medications: None     Allergies   Allergies   Allergen Reactions     Amlodipine Swelling     Lisinopril      Other reaction(s): Angioedema  Mouth and tongue swelling   Mouth and tongue swelling          Social History   I have reviewed this patient's social history and updated it with pertinent information if needed. Jim Richard  reports  that he has been smoking cigarettes. He has been smoking about 1.00 pack per day. He has never used smokeless tobacco. He reports current alcohol use. He reports that he does not use drugs.    Family History   Family history assessed and, except as above, is non-contributory.    Family History   Problem Relation Age of Onset     Mental Illness Son      Coronary Artery Disease Early Onset Mother      Substance Abuse Father      Lung Cancer Father      Substance Abuse Brother      Unknown/Adopted No family hx of      Depression No family hx of      Anxiety Disorder No family hx of      Schizophrenia No family hx of      Bipolar Disorder No family hx of      Suicide No family hx of      Dementia No family hx of      Otero Disease No family hx of      Parkinsonism No family hx of      Autism Spectrum Disorder No family hx of      Intellectual Disability (Mental Retardation) No family hx of        Review of Systems   The 10 point Review of Systems is negative other than noted in the HPI or here.     Primary Care Physician   FERNANDA BRANTLEY    Data   Labs Ordered and Resulted from Time of ED Arrival Up to the Time of Departure from the ED   CBC WITH PLATELETS DIFFERENTIAL - Abnormal; Notable for the following components:       Result Value    RBC Count 2.70 (*)     Hemoglobin 8.5 (*)     Hematocrit 25.8 (*)     RDW 16.9 (*)     Platelet Count 122 (*)     Absolute Lymphocytes 0.7 (*)     All other components within normal limits   COMPREHENSIVE METABOLIC PANEL - Abnormal; Notable for the following components:    Chloride 111 (*)     Urea Nitrogen 34 (*)     Creatinine 1.63 (*)     GFR Estimate 43 (*)     GFR Estimate If Black 50 (*)     Calcium 7.7 (*)     Albumin 2.8 (*)     Protein Total 6.6 (*)     Alkaline Phosphatase 273 (*)      (*)     All other components within normal limits   LACTIC ACID WHOLE BLOOD - Abnormal; Notable for the following components:    Lactic Acid 2.6 (*)     All other components  within normal limits   ALCOHOL ETHYL - Abnormal; Notable for the following components:    Ethanol g/dL 0.33 (*)     All other components within normal limits   INR - Abnormal; Notable for the following components:    INR 1.16 (*)     All other components within normal limits   LIPASE - Abnormal; Notable for the following components:    Lipase 799 (*)     All other components within normal limits   TROPONIN I   MAGNESIUM   PHOSPHORUS   SARS-COV-2 (COVID-19) VIRUS RT-PCR       Data reviewed today:  I personally reviewed the EKG tracing showing NSR with RBBB.    No results found for this or any previous visit (from the past 24 hour(s)).

## 2021-02-09 NOTE — PROGRESS NOTES
[facility]    Hospitalist Progress Note      Assessment & Plan   Jim Richard is a 65 year old male with hx of alcoholic cirrhosis with ongoing alcohol use, HTN, CKD, COPD with ongoing tobacco use, recent GIB, and depression/anxiety who presents to the Emergency Department for evaluation of abdominal distension and acute encephalopathy in the setting of alcohol intoxication.     Recurrent large volume ascites due to cirrhosis s/p paracentesis 2/9/21: Note is made of recurrent ascites requiring paracenteses every 2 weeks; the last time was 2/3/2021 as an outpatient (2.4 L removed). He is not on diuretics due to CKD reportedly. He presents for abdominal distension, without other signs of SBP. His Lipase is mildly elevated but not to a degree consistent with acute pancreatitis. He continues to drink alcohol.    -- US guided large volume paracentesis ordered with removal of 3350cc fluid.     -- For any fever or signs of impending infectious process will start abx and obtain BC; currently without infectious symptoms      Acute toxic encephalopathy due to alcohol intoxication  Alcohol abuse with acute alcohol withdrawal    Causing mild ongoing alcoholic hepatitis. Etoh on admission 0.33.  Reports drinking 750ml vodka daily. Notably here with lengthy hospitalization 2020 and patient ultimately placed on commitment 11/2020 through May through [facility].   Patient reports he was supposed to present to [facility] for treatment last week or this week but did not.   --CIWA with Ativan ordered   --Thiamine, Folate, MVI   --social work consult for dispo assistance. apparently  is on vacation per patient     Prolonged QTc  EKG on admission with QTc of 509. On multiple prolonging psychiatric medications PTA.   --hold PTA trazodone, Remeron  --avoid antiemetics  --repeat EKG in am   --replace magnesium   --1L LR tonight      Chronic anemia and thrombocytopenia:   Recent GIB  Note is  made of prior banding of esophageal varices in 2018. Most recently he was hospitalized here from 1/22/2021 to 1/27/2021 for alcohol withdrawal and melena causing acute blood loss anemia (HGB fabiola 7.5). [facility] GI was consulted and repeat EGD showed esophagitis, grade I esophageal varices, portal hypertensive gastropathy, and erythematous duodenopathy, but no active bleeding.     -- Close follow up with GI at discharge to consider TIPS     CKD Stage 3-4: Baseline Cr 1.4-1.9. Cr 1.63, and was 1.73 on 1/27/2021. Avoid NSAID's while hospitalized. Monitor renal function closely while hospitalized.      Lactic acidosis, acute: Likely due to alcoholic ketoacidosis. Monitor as needed     BPH with urinary retention: Reports ongoing issues with urinary retention at home though this is in the setting of non compliance with flomax. During recent admission flomax dose increased 0.4>0.8.   --resume flomax  --monitor PVRs     Essential hypertension  -  resume Atenolol with hold parameters     COPD  Resume home inhalers. No evidence of acute exacerbation.       JANIS  - Continue CPAP     Chronic depression and anxiety: He declines Psychiatry consult for now. He says he has an appointment with his Psychiatrist in 2 days.  PTA: vortioxetine, mirtazapine, and trazodone, and seroquel   --discussed with Dr. Rand's clinic, patient advised to follow up at [facility] following discharge   --holding trazodone, Remeron until QTc improves  --continue Seroquel for now     Severe malnutrition  In Context of:  Chronic illness or disease  Environmental or social circumstances  % Weight Loss:  None noted  % Intake:  </= 75% for >/= 1 month (severe malnutrition)  Subcutaneous Fat Loss:  Orbital region mild depletion and Upper arm region moderate depletion  Muscle Loss:  Temporal region mild depletion and Clavicle bone region mild depletion  Fluid Retention:  Moderate   - Chocolate shake BID between meals   - Continue micronutrients as ordered      DVT Prophylaxis: Pneumatic Compression Devices  Code Status: Full Code    Disposition: Expected discharge in 2+ days once withdrawal resolved and dispo planned    This patient was seen and examined with Dr. Ralph who agrees with the above plan.    Rupinder Orozco PA-C    Interval History   Reports he is feeling better after paracentesis though now is developing withdrawal symptoms, primarily shaking. He has been drinking since past discharge and has been non compliant with medications. Reports worsening urinary retention the past few days. No SOB, CP, dizziness.     -Data reviewed today: I reviewed all new labs and imaging results over the last 24 hours. I personally reviewed no images or EKG's today.    Physical Exam   Temp: 98.8  F (37.1  C) Temp src: Oral BP: 125/70 Pulse: 84   Resp: 16 SpO2: 96 % O2 Device: Nasal cannula Oxygen Delivery: 2 LPM  Vitals:    02/09/21 0219   Weight: 83.9 kg (185 lb)     Vital Signs with Ranges  Temp:  [98.8  F (37.1  C)-99.4  F (37.4  C)] 98.8  F (37.1  C)  Pulse:  [] 84  Resp:  [12-95] 16  BP: (121-148)/(58-88) 125/70  SpO2:  [92 %-99 %] 96 %  No intake/output data recorded.    Full exam per Dr. Ralph    General: alert and oriented, laying down in bed. Appears comfortable overall. Appropriately conversant  Neuro:  Mild tremor. Speech clear. Face symmetric. Follows commands  Respiratory: breathing non labored   Eyes: sclera anicteric. EOMI    Medications       folic acid  1 mg Oral Daily    multivitamin w/minerals  1 tablet Oral Daily    sodium chloride (PF)  3 mL Intracatheter Q8H    thiamine  200 mg Oral TID    Followed by    [START ON 2/11/2021] thiamine  100 mg Oral TID    Followed by    [START ON 2/16/2021] thiamine  100 mg Oral Daily       Data   Recent Labs   Lab 02/09/21  0223   WBC 4.0   HGB 8.5*   MCV 96   *   INR 1.16*      POTASSIUM 4.2   CHLORIDE 111*   CO2 23   BUN 34*   CR 1.63*   ANIONGAP 8   KEV 7.7*   GLC 93   ALBUMIN 2.8*   PROTTOTAL  6.6*   BILITOTAL 0.5   ALKPHOS 273*   ALT 45   *   LIPASE 799*   TROPI <0.015       No results found for this or any previous visit (from the past 24 hour(s)).

## 2021-02-09 NOTE — PROGRESS NOTES
Patient is on US schedule today Tuesday 2/9/2021 for a Diagnostic and therapeutic paracentesis.     -Labs WNL for procedure.    -No NPO required.   -Consent will be done prior to procedure.     Please contact the US department at [phone] for procedural related questions.     Thanks Kendra [facility] CNP Interventional Radiology ([phone])

## 2021-02-09 NOTE — ED NOTES
Bed: ED18  Expected date: 2/9/21  Expected time:   Means of arrival:   Comments:  [id number] 65m etoh ascites weak

## 2021-02-09 NOTE — ED TRIAGE NOTES
[facility]  ED Arrival Note      Means of Arrival: EMS  Comes from: Home    Story: Pt lives alone at home. Called 911 as he was not feeling well at home. Pt reports drinking 750ml of Vodka tonight, which is what he usually drinks. Distended abdomen, pt reports that it is r/t asciatis. Alert and oriented x4. Calm and cooperative.         EMS/PD Interventions: N/A  EMS Medications: N/A      Directed to: Main ED  Belongings: In room locker

## 2021-02-10 LAB
ALBUMIN SERPL-MCNC: 2.2 G/DL (ref 3.4–5)
ALP SERPL-CCNC: 193 U/L (ref 40–150)
ALT SERPL W P-5'-P-CCNC: 30 U/L (ref 0–70)
ANION GAP SERPL CALCULATED.3IONS-SCNC: 6 MMOL/L (ref 3–14)
AST SERPL W P-5'-P-CCNC: 56 U/L (ref 0–45)
BILIRUB SERPL-MCNC: 1.1 MG/DL (ref 0.2–1.3)
BUN SERPL-MCNC: 33 MG/DL (ref 7–30)
CALCIUM SERPL-MCNC: 7.8 MG/DL (ref 8.5–10.1)
CHLORIDE SERPL-SCNC: 102 MMOL/L (ref 94–109)
CO2 SERPL-SCNC: 24 MMOL/L (ref 20–32)
CREAT SERPL-MCNC: 1.92 MG/DL (ref 0.66–1.25)
GFR SERPL CREATININE-BSD FRML MDRD: 35 ML/MIN/{1.73_M2}
GLUCOSE SERPL-MCNC: 127 MG/DL (ref 70–99)
MAGNESIUM SERPL-MCNC: 1.8 MG/DL (ref 1.6–2.3)
PHOSPHATE SERPL-MCNC: 3.2 MG/DL (ref 2.5–4.5)
POTASSIUM SERPL-SCNC: 3.7 MMOL/L (ref 3.4–5.3)
PROT SERPL-MCNC: 5.3 G/DL (ref 6.8–8.8)
SODIUM SERPL-SCNC: 132 MMOL/L (ref 133–144)

## 2021-02-10 PROCEDURE — 120N000001 HC R&B MED SURG/OB

## 2021-02-10 PROCEDURE — 258N000003 HC RX IP 258 OP 636: Performed by: PHYSICIAN ASSISTANT

## 2021-02-10 PROCEDURE — P9047 ALBUMIN (HUMAN), 25%, 50ML: HCPCS | Performed by: PHYSICIAN ASSISTANT

## 2021-02-10 PROCEDURE — 93005 ELECTROCARDIOGRAM TRACING: CPT

## 2021-02-10 PROCEDURE — 93010 ELECTROCARDIOGRAM REPORT: CPT | Performed by: INTERNAL MEDICINE

## 2021-02-10 PROCEDURE — 99233 SBSQ HOSP IP/OBS HIGH 50: CPT | Performed by: HOSPITALIST

## 2021-02-10 PROCEDURE — 80053 COMPREHEN METABOLIC PANEL: CPT | Performed by: PHYSICIAN ASSISTANT

## 2021-02-10 PROCEDURE — 83735 ASSAY OF MAGNESIUM: CPT | Performed by: PHYSICIAN ASSISTANT

## 2021-02-10 PROCEDURE — 250N000013 HC RX MED GY IP 250 OP 250 PS 637: Performed by: PHYSICIAN ASSISTANT

## 2021-02-10 PROCEDURE — 84100 ASSAY OF PHOSPHORUS: CPT | Performed by: PHYSICIAN ASSISTANT

## 2021-02-10 PROCEDURE — 250N000011 HC RX IP 250 OP 636: Performed by: PHYSICIAN ASSISTANT

## 2021-02-10 PROCEDURE — 36415 COLL VENOUS BLD VENIPUNCTURE: CPT | Performed by: PHYSICIAN ASSISTANT

## 2021-02-10 PROCEDURE — 250N000013 HC RX MED GY IP 250 OP 250 PS 637: Performed by: HOSPITALIST

## 2021-02-10 RX ORDER — ALBUMIN (HUMAN) 12.5 G/50ML
25 SOLUTION INTRAVENOUS ONCE
Status: COMPLETED | OUTPATIENT
Start: 2021-02-10 | End: 2021-02-10

## 2021-02-10 RX ADMIN — LORAZEPAM 1 MG: 1 TABLET ORAL at 13:42

## 2021-02-10 RX ADMIN — MIRTAZAPINE 30 MG: 15 TABLET, FILM COATED ORAL at 21:11

## 2021-02-10 RX ADMIN — CARBIDOPA AND LEVODOPA 7.5 MG: 50; 200 TABLET, EXTENDED RELEASE ORAL at 08:44

## 2021-02-10 RX ADMIN — FOLIC ACID 1 MG: 1 TABLET ORAL at 08:45

## 2021-02-10 RX ADMIN — QUETIAPINE FUMARATE 100 MG: 100 TABLET ORAL at 21:11

## 2021-02-10 RX ADMIN — THIAMINE HCL TAB 100 MG 200 MG: 100 TAB at 08:44

## 2021-02-10 RX ADMIN — PANTOPRAZOLE SODIUM 40 MG: 40 TABLET, DELAYED RELEASE ORAL at 21:11

## 2021-02-10 RX ADMIN — CARBIDOPA AND LEVODOPA 7.5 MG: 50; 200 TABLET, EXTENDED RELEASE ORAL at 12:15

## 2021-02-10 RX ADMIN — VORTIOXETINE 10 MG: 10 TABLET, FILM COATED ORAL at 08:54

## 2021-02-10 RX ADMIN — SODIUM CHLORIDE 500 ML: 9 INJECTION, SOLUTION INTRAVENOUS at 20:41

## 2021-02-10 RX ADMIN — PANTOPRAZOLE SODIUM 40 MG: 40 TABLET, DELAYED RELEASE ORAL at 08:45

## 2021-02-10 RX ADMIN — TAMSULOSIN HYDROCHLORIDE 0.8 MG: 0.4 CAPSULE ORAL at 08:44

## 2021-02-10 RX ADMIN — TRAZODONE HYDROCHLORIDE 100 MG: 100 TABLET ORAL at 21:11

## 2021-02-10 RX ADMIN — THIAMINE HCL TAB 100 MG 200 MG: 100 TAB at 17:21

## 2021-02-10 RX ADMIN — MULTIPLE VITAMINS W/ MINERALS TAB 1 TABLET: TAB at 08:45

## 2021-02-10 RX ADMIN — HYDROXYZINE HYDROCHLORIDE 50 MG: 25 TABLET, FILM COATED ORAL at 23:04

## 2021-02-10 RX ADMIN — LORAZEPAM 1 MG: 1 TABLET ORAL at 08:45

## 2021-02-10 RX ADMIN — QUETIAPINE 50 MG: 25 TABLET ORAL at 11:19

## 2021-02-10 RX ADMIN — CARBIDOPA AND LEVODOPA 7.5 MG: 50; 200 TABLET, EXTENDED RELEASE ORAL at 17:21

## 2021-02-10 RX ADMIN — ALBUMIN HUMAN 25 G: 0.25 SOLUTION INTRAVENOUS at 17:30

## 2021-02-10 ASSESSMENT — ACTIVITIES OF DAILY LIVING (ADL)
ADLS_ACUITY_SCORE: 17
DRESSING/BATHING_DIFFICULTY: YES
ADLS_ACUITY_SCORE: 17
ADLS_ACUITY_SCORE: 17
TOILETING_ASSISTANCE: TOILETING DIFFICULTY, ASSISTANCE 1 PERSON
DRESSING/BATHING: BATHING DIFFICULTY, ASSISTANCE 1 PERSON
ADLS_ACUITY_SCORE: 17
TOILETING_ISSUES: YES
WHICH_OF_THE_ABOVE_FUNCTIONAL_RISKS_HAD_A_RECENT_ONSET_OR_CHANGE?: AMBULATION;TRANSFERRING;TOILETING;BATHING;DRESSING

## 2021-02-10 ASSESSMENT — MIFFLIN-ST. JEOR: SCORE: 1578.63

## 2021-02-10 NOTE — PROGRESS NOTES
Discussed with patient regarding his admission. Patient stating he started drinking again as he did not hear from his casemanager who was going to enroll him into outpatient treatment program. Writeer attempted to call Omayra Winkler and noted she is on vacation until 2/11. Message left for Supervisor Elle [phone] regarding patients admission.  Patient presently c/o urinary retention and abd pain post paracentesis.  SW to contact C/M in am to determine disposition.

## 2021-02-10 NOTE — PROGRESS NOTES
[facility]    Hospitalist Progress Note      Assessment & Plan   Jim Richard is a 65 year old male with hx of alcoholic cirrhosis with ongoing alcohol use, HTN, CKD, COPD with ongoing tobacco use, recent GIB, and depression/anxiety who presents to the Emergency Department for evaluation of abdominal distension and acute encephalopathy in the setting of alcohol intoxication.      Recurrent large volume ascites due to cirrhosis s/p paracentesis 2/9/21: Note is made of recurrent ascites requiring paracenteses every 2 weeks; the last time was 2/3/2021 as an outpatient (2.4 L removed). He is not on diuretics due to CKD reportedly. He presents for abdominal distension, without other signs of SBP. His Lipase is mildly elevated but not to a degree consistent with acute pancreatitis. He continues to drink alcohol.    -- US guided large volume paracentesis ordered with removal of 3350cc fluid.     -- For any fever or signs of impending infectious process will start abx and obtain BC; currently without infectious symptoms      Acute toxic encephalopathy due to alcohol intoxication, resolved  Alcohol abuse with acute alcohol withdrawal    Causing mild ongoing alcoholic hepatitis. Etoh on admission 0.33.  Reports drinking 750ml vodka daily. Notably here with lengthy hospitalization 2020 and patient ultimately placed on commitment 11/2020 through May through [facility].   Patient reports he was supposed to present to [facility] for treatment last week or this week but did not. He is now alert and oriented.   --CIWA with Ativan ordered- symptoms managed on current regimen   --Thiamine, Folate, MVI   --social work consult for dispo assistance. apparently  is on vacation per patient     Hyponatremia.   --repeat BMP in the am      Prolonged QTc, resolved  EKG on admission with QTc of 509, improved to 468 after IVF and magnesium replacement. On multiple prolonging psychiatric  medications PTA.   --follow electrolytes  --PTA meds resumed     BPH with urinary retention: Reports ongoing issues with urinary retention at home though this is in the setting of non compliance with flomax. During recent admission flomax dose increased 0.4>0.8.   --continue flomax  --monitor PVRs, if no improvement tomorrow will ask Urology to see      Chronic anemia and thrombocytopenia:   Recent GIB  Note is made of prior banding of esophageal varices in 2018. Most recently he was hospitalized here from 1/22/2021 to 1/27/2021 for alcohol withdrawal and melena causing acute blood loss anemia (HGB fabiola 7.5). [facility] GI was consulted and repeat EGD showed esophagitis, grade I esophageal varices, portal hypertensive gastropathy, and erythematous duodenopathy, but no active bleeding.     -- Close follow up with GI at discharge to consider TIPS     CKD Stage 3-4: Baseline Cr 1.4-1.9. Cr 1.9 up from 1.6 on admission.   --follow BMP  --likely hepatorenal syndrome; will give dose of albumin followed by 500cc NS and recheck BMP in am     Lactic acidosis, acute: Likely due to alcoholic ketoacidosis. Monitor as needed     Essential hypertension  -  resume Atenolol with hold parameters     COPD  Resume home inhalers. No evidence of acute exacerbation.       JANIS  - Continue CPAP     Chronic depression and anxiety: He declines Psychiatry consult for now. He says he has an appointment with his Psychiatrist in 2 days.  PTA: vortioxetine, mirtazapine, and trazodone, and seroquel   --discussed with Dr. Rand's clinic, patient advised to follow up at [facility] following discharge   --resume trazodone, Remeron as QTc has improved  --continue Seroquel for now      Severe malnutrition  In Context of:  Chronic illness or disease  Environmental or social circumstances  % Weight Loss:  None noted  % Intake:  </= 75% for >/= 1 month (severe malnutrition)  Subcutaneous Fat Loss:  Orbital region mild depletion and Upper arm region  moderate depletion  Muscle Loss:  Temporal region mild depletion and Clavicle bone region mild depletion  Fluid Retention:  Moderate   - Chocolate shake BID between meals   - Continue micronutrients as ordered   --nutrition consulted     DVT Prophylaxis: Pneumatic Compression Devices  Code Status: Full Code    Disposition: Expected discharge in 2+ days. Will need to discuss disposition with case management team due to commitment status. Seems he may benefit from inpatient stay due to multiple relapses and treatment non compliance despite commitment status.     Patient was seen and examined with Dr. Ralph who agrees with the above plan.     Rupinder Orozco PA-C    Interval History   Reports feeling ok today. Abdomen with mildly increased distension but still soft. No CP, SOB. Intermittent mild nausea, poor appetite. Still feels he is incompletely emptying with voiding. Tremor controlled with ativan     -Data reviewed today: I reviewed all new labs and imaging results over the last 24 hours. I personally reviewed no images or EKG's today.    Physical Exam   Temp: 98.3  F (36.8  C) Temp src: Oral BP: 99/46 Pulse: 58   Resp: 18 SpO2: 97 % O2 Device: Nasal cannula Oxygen Delivery: 2 LPM  Vitals:    02/09/21 0219 02/10/21 0706   Weight: 83.9 kg (185 lb) 84 kg (185 lb 3 oz)     Vital Signs with Ranges  Temp:  [98.3  F (36.8  C)-98.9  F (37.2  C)] 98.3  F (36.8  C)  Pulse:  [58-93] 58  Resp:  [16-18] 18  BP: ()/(46-76) 99/46  SpO2:  [94 %-98 %] 97 %  I/O last 3 completed shifts:  In: -   Out: 545 [Urine:545]    Constitutional: Alert and oriented, sitting up in bed. Appears comfortable and is appropriately conversant   Eyes:  EOMI  Respiratory: Lungs clear to auscultation bilaterally, no increased work of breathing  Cardiovascular: Regular rate and rhythm  GI: active bowel sounds, abdomen soft, moderately distended without guarding. +umbilical hernia   MSK:  Moves all four extremities.   Neuro:  Speech is clear.  Face symmetric. Follows commands. Mild resting tremor     Medications       atenolol  25 mg Oral Daily     folic acid  1 mg Oral Daily     midodrine  7.5 mg Oral TID w/meals     [Held by provider] mirtazapine  30 mg Oral At Bedtime     multivitamin w/minerals  1 tablet Oral Daily     pantoprazole  40 mg Oral BID     QUEtiapine  100 mg Oral At Bedtime     sodium chloride (PF)  3 mL Intracatheter Q8H     tamsulosin  0.8 mg Oral Daily     thiamine  200 mg Oral TID    Followed by     [START ON 2/11/2021] thiamine  100 mg Oral TID    Followed by     [START ON 2/16/2021] thiamine  100 mg Oral Daily     [Held by provider] traZODone  100 mg Oral At Bedtime     vortioxetine  10 mg Oral Daily       Data   Recent Labs   Lab 02/09/21  1040 02/09/21  0223   WBC  --  4.0   HGB  --  8.5*   MCV  --  96   PLT  --  122*   INR  --  1.16*   NA  --  142   POTASSIUM  --  4.2   CHLORIDE  --  111*   CO2  --  23   BUN  --  34*   CR  --  1.63*   ANIONGAP  --  8   KEV  --  7.7*   GLC  --  93   ALBUMIN 2.7* 2.8*   PROTTOTAL  --  6.6*   BILITOTAL  --  0.5   ALKPHOS  --  273*   ALT  --  45   AST  --  101*   LIPASE  --  799*   TROPI  --  <0.015       Recent Results (from the past 24 hour(s))   US Paracentesis    Narrative    ULTRASOUND PARACENTESIS February 9, 2021 9:06 AM     HISTORY: High volume paracentesis with or without diagnostic fluid  analysis with labs to be drawn if ordered. Total paracentesis volume  as much as possible.    FINDINGS: Ultrasound was used to evaluate for the presence and best  approach for paracentesis. Written and oral informed consent was  obtained. A pause for the cause procedure to verify the correct  patient and correct procedure. The skin overlying the right lower  quadrant was prepped and draped in the usual sterile fashion. The  subcutaneous tissues were anesthetized with 10 mL 1% subcutaneous  lidocaine. A catheter was advanced into the peritoneal space and 3.35  L of  straw colored fluid was drained. There  were no immediate  complications. Ultrasound images were permanently stored. Patient left  the ultrasound suite in satisfactory condition.      Impression    IMPRESSION: Technically successful paracentesis without immediate  complications.    TACO LINDA MD

## 2021-02-10 NOTE — PLAN OF CARE
OH ascities  DATE & TIME: 02/09/21 0700-19   Cognitive Concerns/ Orientation : Alert/Oriented x 4   BEHAVIOR & AGGRESSION TOOL COLOR: Green  CIWA SCORE: 7/10/710/(headache, tremor/nausea)   ABNL VS/O2: BP vssotherwise stable,  MOBILITY: Assist-1 gait belt, fall risk  PAIN MANAGMENT: Declines intervention  DIET: ; 2 gram sodium, poor appetite  BOWEL/BLADDER: Intermittent catheterization for retention >300. Voided 250 tea colored urine with 375 ml PVR. Per MD, started flomax and will wait on str. Cath for now.  ABNL LAB/BG: ETOH 0.33; BUN 34; Creat 1.63; GFR 43; Alb 2.8; Pro 6.6; Alk phos 273; ; Lactic acid 2.6; Lipase 799; Hgb 8.5; hematocrit 25.8; platelet 122; INR 1.16  DRAIN/DEVICES: R PIV LR at 75cchr x13 hrs.  TELEMETRY RHYTHM: n/a  SKIN: Bruising, blotchy skin on arms; stomach very rounded/taut; hernia at umbilicus  TESTS/PROCEDURES: paracentesis , tapped for 3350. Site clean and dry.  D/C DAY/GOALS/PLACE: pending, pt will be discharge to his treatment facility([facility])  OTHER IMPORTANT INFO: Last drink was 02/08 at approximately 2300; Lives at home with dog

## 2021-02-10 NOTE — PROGRESS NOTES
Pt had need cpap order for overnight. Pt didn't brought his home unit and declined to use hospital cpap tonight. RT will bring machine if pt requests.

## 2021-02-10 NOTE — PLAN OF CARE
DATE & TIME: 2/9/21 8246-2999    Cognitive Concerns/ Orientation : Pt A/Ox4   BEHAVIOR & AGGRESSION TOOL COLOR: Green  CIWA SCORE: 4 (tremor, anxiety)/5   ABNL VS/O2: VSS on RA  MOBILITY: Assist x1 with walker and gait belt, fall risk  PAIN MANAGMENT: Complaints of mild abdominal pain at paracentesis site, declines intervention  DIET: 2gm Na  BOWEL/BLADDER: Intermittent cath for retention >300. Adequate urine output overnight.  ABNL LAB/BG: BARNEY 0.33/Albumin 2.7/Lactic acid 2.6  DRAIN/DEVICES: IVF infusing  SKIN: Bruising, blotchy skin. Abdomen rounded/taut. Hernia at umbilicus.  TESTS/PROCEDURES: Paracentesis, 3350cc/hr. Site, CDI.  D/C DAY/GOALS/PLACE: Discharge pending progress, [facility]

## 2021-02-11 LAB
ANION GAP SERPL CALCULATED.3IONS-SCNC: 7 MMOL/L (ref 3–14)
BUN SERPL-MCNC: 27 MG/DL (ref 7–30)
CALCIUM SERPL-MCNC: 8.5 MG/DL (ref 8.5–10.1)
CHLORIDE SERPL-SCNC: 99 MMOL/L (ref 94–109)
CO2 SERPL-SCNC: 23 MMOL/L (ref 20–32)
CREAT SERPL-MCNC: 1.94 MG/DL (ref 0.66–1.25)
GFR SERPL CREATININE-BSD FRML MDRD: 35 ML/MIN/{1.73_M2}
GLUCOSE SERPL-MCNC: 100 MG/DL (ref 70–99)
POTASSIUM SERPL-SCNC: 3.7 MMOL/L (ref 3.4–5.3)
SODIUM SERPL-SCNC: 129 MMOL/L (ref 133–144)

## 2021-02-11 PROCEDURE — 250N000013 HC RX MED GY IP 250 OP 250 PS 637: Performed by: PHYSICIAN ASSISTANT

## 2021-02-11 PROCEDURE — 80048 BASIC METABOLIC PNL TOTAL CA: CPT | Performed by: PHYSICIAN ASSISTANT

## 2021-02-11 PROCEDURE — 36415 COLL VENOUS BLD VENIPUNCTURE: CPT | Performed by: PHYSICIAN ASSISTANT

## 2021-02-11 PROCEDURE — P9047 ALBUMIN (HUMAN), 25%, 50ML: HCPCS | Performed by: HOSPITALIST

## 2021-02-11 PROCEDURE — 250N000011 HC RX IP 250 OP 636: Performed by: HOSPITALIST

## 2021-02-11 PROCEDURE — 250N000013 HC RX MED GY IP 250 OP 250 PS 637: Performed by: HOSPITALIST

## 2021-02-11 PROCEDURE — 99233 SBSQ HOSP IP/OBS HIGH 50: CPT | Performed by: HOSPITALIST

## 2021-02-11 PROCEDURE — 120N000001 HC R&B MED SURG/OB

## 2021-02-11 RX ORDER — ALBUMIN (HUMAN) 12.5 G/50ML
25 SOLUTION INTRAVENOUS EVERY 8 HOURS
Status: COMPLETED | OUTPATIENT
Start: 2021-02-11 | End: 2021-02-12

## 2021-02-11 RX ORDER — FUROSEMIDE 10 MG/ML
20 INJECTION INTRAMUSCULAR; INTRAVENOUS EVERY 8 HOURS
Status: COMPLETED | OUTPATIENT
Start: 2021-02-11 | End: 2021-02-12

## 2021-02-11 RX ADMIN — CARBIDOPA AND LEVODOPA 7.5 MG: 50; 200 TABLET, EXTENDED RELEASE ORAL at 14:51

## 2021-02-11 RX ADMIN — QUETIAPINE FUMARATE 100 MG: 100 TABLET ORAL at 21:28

## 2021-02-11 RX ADMIN — VORTIOXETINE 10 MG: 10 TABLET, FILM COATED ORAL at 09:13

## 2021-02-11 RX ADMIN — PANTOPRAZOLE SODIUM 40 MG: 40 TABLET, DELAYED RELEASE ORAL at 09:13

## 2021-02-11 RX ADMIN — MELATONIN TAB 3 MG 3 MG: 3 TAB at 23:41

## 2021-02-11 RX ADMIN — ATENOLOL 25 MG: 25 TABLET ORAL at 09:13

## 2021-02-11 RX ADMIN — LORAZEPAM 1 MG: 1 TABLET ORAL at 04:29

## 2021-02-11 RX ADMIN — FUROSEMIDE 20 MG: 10 INJECTION, SOLUTION INTRAVENOUS at 21:27

## 2021-02-11 RX ADMIN — MULTIPLE VITAMINS W/ MINERALS TAB 1 TABLET: TAB at 09:13

## 2021-02-11 RX ADMIN — QUETIAPINE 50 MG: 25 TABLET ORAL at 16:41

## 2021-02-11 RX ADMIN — LORAZEPAM 1 MG: 1 TABLET ORAL at 20:11

## 2021-02-11 RX ADMIN — MELATONIN TAB 3 MG 3 MG: 3 TAB at 00:35

## 2021-02-11 RX ADMIN — HYDROXYZINE HYDROCHLORIDE 50 MG: 25 TABLET, FILM COATED ORAL at 21:37

## 2021-02-11 RX ADMIN — FOLIC ACID 1 MG: 1 TABLET ORAL at 09:13

## 2021-02-11 RX ADMIN — CARBIDOPA AND LEVODOPA 7.5 MG: 50; 200 TABLET, EXTENDED RELEASE ORAL at 09:13

## 2021-02-11 RX ADMIN — THIAMINE HCL TAB 100 MG 200 MG: 100 TAB at 00:36

## 2021-02-11 RX ADMIN — THIAMINE HCL TAB 100 MG 100 MG: 100 TAB at 16:41

## 2021-02-11 RX ADMIN — MIRTAZAPINE 30 MG: 15 TABLET, FILM COATED ORAL at 21:27

## 2021-02-11 RX ADMIN — THIAMINE HCL TAB 100 MG 100 MG: 100 TAB at 09:13

## 2021-02-11 RX ADMIN — CARBIDOPA AND LEVODOPA 7.5 MG: 50; 200 TABLET, EXTENDED RELEASE ORAL at 19:20

## 2021-02-11 RX ADMIN — TAMSULOSIN HYDROCHLORIDE 0.8 MG: 0.4 CAPSULE ORAL at 09:13

## 2021-02-11 RX ADMIN — THIAMINE HCL TAB 100 MG 100 MG: 100 TAB at 21:28

## 2021-02-11 RX ADMIN — ALBUMIN HUMAN 25 G: 0.25 SOLUTION INTRAVENOUS at 18:44

## 2021-02-11 RX ADMIN — QUETIAPINE 50 MG: 25 TABLET ORAL at 11:14

## 2021-02-11 RX ADMIN — PANTOPRAZOLE SODIUM 40 MG: 40 TABLET, DELAYED RELEASE ORAL at 21:28

## 2021-02-11 RX ADMIN — LORAZEPAM 1 MG: 1 TABLET ORAL at 23:41

## 2021-02-11 RX ADMIN — TRAZODONE HYDROCHLORIDE 100 MG: 100 TABLET ORAL at 21:28

## 2021-02-11 ASSESSMENT — ACTIVITIES OF DAILY LIVING (ADL)
ADLS_ACUITY_SCORE: 17

## 2021-02-11 NOTE — PLAN OF CARE
Cognitive Concerns/ Orientation : Pt A/Ox4   BEHAVIOR & AGGRESSION TOOL COLOR: Green  CIWA SCORE: 5,6,9, 5. Ativan given x1. Pt tolerated well.   ABNL VS/O2: VSS on RA  MOBILITY: Assist x1 with walker and gait belt, fall risk. Bed alarm on.   PAIN MANAGMENT: Complaints of mild abdominal pain at paracentesis site, declines intervention  DIET: 2gm Na, tolerating well.   BOWEL/BLADDER: Intermittent cath for retention >300. Adequate urine output overnight.  ABNL LAB/BG: BARNEY 0.33/Albumin 2.7/Lactic acid 2.6  DRAIN/DEVICES: SL  SKIN: Bruising, blotchy skin. Abdomen rounded/taut. Hernia at umbilicus.  TESTS/PROCEDURES: Paracentesis 2/9 , 3350cc removed.. Site, CDI.  D/C DAY/GOALS/PLACE: Discharge pending progress,  following.

## 2021-02-11 NOTE — PLAN OF CARE
Cognitive Concerns/ Orientation : Pt A/Ox4   BEHAVIOR & AGGRESSION TOOL COLOR: Green  CIWA SCORE: 8/0(sleeping) 7/10/0/10/5/5  ABNL VS/O2: VSS on RA  MOBILITY: Assist x1 with walker and gait belt, fall risk  PAIN MANAGMENT: Complaints of mild abdominal pain at paracentesis site, declines intervention  DIET: 2gm Na  BOWEL/BLADDER: Intermittent cath for retention >300. Adequate urine output overnight.  ABNL LAB/BG: BARNEY 0.33/Albumin 2.7/Lactic acid 2.6  DRAIN/DEVICES: SL  SKIN: Bruising, blotchy skin. Abdomen rounded/taut. Hernia at umbilicus.  TESTS/PROCEDURES: Paracentesis yesterday, 3350cc removed.. Site, CDI.  D/C DAY/GOALS/PLACE: Discharge pending progress,  followin

## 2021-02-11 NOTE — PLAN OF CARE
Cognitive Concerns/ Orientation : Pt A/Ox4   BEHAVIOR & AGGRESSION TOOL COLOR: Green  CIWA SCORE: 4/4. Ativan given x1. Pt tolerated well.   ABNL VS/O2: VSS on RA  MOBILITY: Assist x1 with walker and gait belt, fall risk. Bed alarm on.   PAIN MANAGMENT: Complaints of mild abdominal pain at paracentesis site, declines intervention  DIET: 2gm Na, tolerating well.   BOWEL/BLADDER: Intermittent cath for retention >300.was cathed x1  ABNL LAB/BG: BARNEY 0.33/Albumin 2.7/Lactic acid 2.6  DRAIN/DEVICES: SL  SKIN: Bruising, blotchy skin. Abdomen rounded/taut. Hernia at umbilicus.  TESTS/PROCEDURES: Paracentesis 2/9 , 3350cc removed.. Site, CDI. ? Para again tomorrow.  D/C DAY/GOALS/PLACE: Discharge pending progress,  followin

## 2021-02-11 NOTE — PROGRESS NOTES
[facility]    Hospitalist Progress Note      Assessment & Plan   Jim Richard is a 65 year old male with hx of alcoholic cirrhosis with ongoing alcohol use, HTN, CKD, COPD with ongoing tobacco use, recent GIB, and depression/anxiety who presents to the Emergency Department for evaluation of abdominal distension and acute encephalopathy in the setting of alcohol intoxication.      Recurrent large volume ascites due to cirrhosis s/p paracentesis 2/9/21: Note is made of recurrent ascites requiring paracenteses every 2 weeks; the last time was 2/3/2021 as an outpatient (2.4 L removed). He is not on diuretics due to CKD reportedly. He presents for abdominal distension, without other signs of SBP. His Lipase is mildly elevated but not to a degree consistent with acute pancreatitis. He continues to drink alcohol.    -- US guided large volume paracentesis ordered with removal of 3350cc fluid. anticipate he will need additional paracentesis in the next few days.      Acute toxic encephalopathy due to alcohol intoxication, resolved  Alcohol abuse with acute alcohol withdrawal    Causing mild ongoing alcoholic hepatitis. Etoh on admission 0.33.  Reports drinking 750ml vodka daily. Notably here with lengthy hospitalization 2020 and patient ultimately placed on commitment 11/2020 through May through [facility].   Patient reports he was supposed to present to [facility] for treatment last week or this week but did not. He is now alert and oriented.   --CIWA with Ativan ordered- symptoms managed on current regimen   --Thiamine, Folate, MVI   --social work consult for dispo assistance. Will have therapies see to assist with disposition      Hyponatremia. Na 132>129 after receiving 500cc NS yesterday.   --repeat BMP in the am      Prolonged QTc, resolved  EKG on admission with QTc of 509, improved to 468 after IVF and magnesium replacement. On multiple prolonging psychiatric medications PTA.    --follow electrolytes  --PTA meds resumed      BPH with urinary retention: Reports ongoing issues with urinary retention at home though this is in the setting of non compliance with flomax. During recent admission flomax dose increased 0.4>0.8.   --continue flomax  --monitor PVRs which seem to be improving      Chronic anemia and thrombocytopenia:   Recent GIB  Note is made of prior banding of esophageal varices in 2018. Most recently he was hospitalized here from 1/22/2021 to 1/27/2021 for alcohol withdrawal and melena causing acute blood loss anemia (HGB fabiola 7.5). [facility] GI was consulted and repeat EGD showed esophagitis, grade I esophageal varices, portal hypertensive gastropathy, and erythematous duodenopathy, but no active bleeding.     -- Close follow up with GI at discharge to consider TIPS     CKD Stage 3-4: Baseline Cr 1.4-1.9. Cr 1.9 up from 1.6 on admission and stable.   --give albumin x3 with low dose IV lasix after first two doses   --BMP in am      Lactic acidosis, acute: Likely due to alcoholic ketoacidosis. Monitor as needed     Essential hypertension  -  resume Atenolol with hold parameters     COPD  Resume home inhalers. No evidence of acute exacerbation.       JANIS  - Continue CPAP     Chronic depression and anxiety: He declines Psychiatry consult for now. He says he has an appointment with his Psychiatrist in 2 days.  PTA: vortioxetine, mirtazapine, and trazodone, and seroquel   --discussed with Dr. Rand's clinic, patient advised to follow up at [facility] following discharge   --resume trazodone, Remeron as QTc has improved  --continue Seroquel for now      Severe malnutrition  In Context of:  Chronic illness or disease  Environmental or social circumstances  % Weight Loss:  None noted  % Intake:  </= 75% for >/= 1 month (severe malnutrition)  Subcutaneous Fat Loss:  Orbital region mild depletion and Upper arm region moderate depletion  Muscle Loss:  Temporal region mild depletion and  Clavicle bone region mild depletion  Fluid Retention:  Moderate   - Chocolate shake BID between meals   - Continue micronutrients as ordered   --nutrition consulted      DVT Prophylaxis: Pneumatic Compression Devices  Code Status: Full Code    Disposition: Expected discharge in 2 days when withdrawal symptoms improved and safe dispo found     Patient was seen and examined with Dr. Ralph who agrees with the above plan     Rupinder Orozco PA-C    Interval History   Slept poorly overnight. Denies abdominal pain but feels mild increase in distension. Continues to have tremor and intermittent nausea. Feels symptoms lasting longer than his typical withdrawal. PVR improving though he still feels he is on completely emptying.     -Data reviewed today: I reviewed all new labs and imaging results over the last 24 hours. I personally reviewed no images or EKG's today.    Physical Exam   Temp: 97.6  F (36.4  C) Temp src: Oral BP: 113/70 Pulse: 74   Resp: 18 SpO2: 95 % O2 Device: None (Room air)    Vitals:    02/09/21 0219 02/10/21 0706   Weight: 83.9 kg (185 lb) 84 kg (185 lb 3 oz)     Vital Signs with Ranges  Temp:  [97.6  F (36.4  C)-98.6  F (37  C)] 97.6  F (36.4  C)  Pulse:  [52-77] 74  Resp:  [16-18] 18  BP: (109-145)/(55-71) 113/70  SpO2:  [95 %-98 %] 95 %  I/O last 3 completed shifts:  In: 300 [P.O.:300]  Out: 1355 [Urine:1355]    Constitutional: Alert and oriented, sitting up in bed. Appears comfortable and is appropriately conversant   Eyes:  EOMI  Respiratory: Lungs clear to auscultation bilaterally, no increased work of breathing  Cardiovascular: Regular rate and rhythm  GI: active bowel sounds, abdomen soft, moderately distended without guarding. +umbilical hernia   MSK:  Moves all four extremities.   Neuro:  Speech is clear. Face symmetric. Follows commands. Mild resting tremor     Medications       atenolol  25 mg Oral Daily     folic acid  1 mg Oral Daily     midodrine  7.5 mg Oral TID w/meals      mirtazapine  30 mg Oral At Bedtime     multivitamin w/minerals  1 tablet Oral Daily     pantoprazole  40 mg Oral BID     QUEtiapine  100 mg Oral At Bedtime     sodium chloride (PF)  3 mL Intracatheter Q8H     tamsulosin  0.8 mg Oral Daily     thiamine  100 mg Oral TID    Followed by     [START ON 2/16/2021] thiamine  100 mg Oral Daily     traZODone  100 mg Oral At Bedtime     vortioxetine  10 mg Oral Daily       Data   Recent Labs   Lab 02/10/21  0835 02/09/21  1040 02/09/21  0223   WBC  --   --  4.0   HGB  --   --  8.5*   MCV  --   --  96   PLT  --   --  122*   INR  --   --  1.16*   *  --  142   POTASSIUM 3.7  --  4.2   CHLORIDE 102  --  111*   CO2 24  --  23   BUN 33*  --  34*   CR 1.92*  --  1.63*   ANIONGAP 6  --  8   KEV 7.8*  --  7.7*   *  --  93   ALBUMIN 2.2* 2.7* 2.8*   PROTTOTAL 5.3*  --  6.6*   BILITOTAL 1.1  --  0.5   ALKPHOS 193*  --  273*   ALT 30  --  45   AST 56*  --  101*   LIPASE  --   --  799*   TROPI  --   --  <0.015       No results found for this or any previous visit (from the past 24 hour(s)).

## 2021-02-12 ENCOUNTER — APPOINTMENT (OUTPATIENT)
Dept: ULTRASOUND IMAGING | Facility: CLINIC | Age: 66
DRG: 432 | End: 2021-02-12
Attending: HOSPITALIST
Payer: MEDICARE

## 2021-02-12 ENCOUNTER — APPOINTMENT (OUTPATIENT)
Dept: OCCUPATIONAL THERAPY | Facility: CLINIC | Age: 66
DRG: 432 | End: 2021-02-12
Attending: PHYSICIAN ASSISTANT
Payer: MEDICARE

## 2021-02-12 ENCOUNTER — APPOINTMENT (OUTPATIENT)
Dept: PHYSICAL THERAPY | Facility: CLINIC | Age: 66
DRG: 432 | End: 2021-02-12
Attending: PHYSICIAN ASSISTANT
Payer: MEDICARE

## 2021-02-12 LAB
ALBUMIN FLD-MCNC: 0.8 G/DL
ALBUMIN SERPL-MCNC: 3 G/DL (ref 3.4–5)
ANION GAP SERPL CALCULATED.3IONS-SCNC: 7 MMOL/L (ref 3–14)
APPEARANCE FLD: CLEAR
APPEARANCE FLD: CLEAR
BUN SERPL-MCNC: 28 MG/DL (ref 7–30)
CALCIUM SERPL-MCNC: 8.1 MG/DL (ref 8.5–10.1)
CHLORIDE SERPL-SCNC: 103 MMOL/L (ref 94–109)
CO2 SERPL-SCNC: 25 MMOL/L (ref 20–32)
COLOR FLD: YELLOW
COLOR FLD: YELLOW
CREAT SERPL-MCNC: 2.16 MG/DL (ref 0.66–1.25)
ERYTHROCYTE [DISTWIDTH] IN BLOOD BY AUTOMATED COUNT: 16.8 % (ref 10–15)
GFR SERPL CREATININE-BSD FRML MDRD: 31 ML/MIN/{1.73_M2}
GLUCOSE SERPL-MCNC: 95 MG/DL (ref 70–99)
GRAM STN SPEC: NORMAL
HCT VFR BLD AUTO: 23.4 % (ref 40–53)
HGB BLD-MCNC: 7.7 G/DL (ref 13.3–17.7)
INR PPP: 1.34 (ref 0.86–1.14)
LYMPHOCYTES NFR FLD MANUAL: 10 %
LYMPHOCYTES NFR FLD MANUAL: 3 %
MAGNESIUM SERPL-MCNC: 1.9 MG/DL (ref 1.6–2.3)
MCH RBC QN AUTO: 30.3 PG (ref 26.5–33)
MCHC RBC AUTO-ENTMCNC: 32.9 G/DL (ref 31.5–36.5)
MCV RBC AUTO: 92 FL (ref 78–100)
MONOS+MACROS NFR FLD MANUAL: 65 %
MONOS+MACROS NFR FLD MANUAL: 72 %
NEUTS BAND NFR FLD MANUAL: 4 %
OTHER CELLS FLD MANUAL: 21 %
OTHER CELLS FLD MANUAL: 25 %
PHOSPHATE SERPL-MCNC: 4 MG/DL (ref 2.5–4.5)
PLATELET # BLD AUTO: 73 10E9/L (ref 150–450)
POTASSIUM SERPL-SCNC: 3.5 MMOL/L (ref 3.4–5.3)
PROT FLD-MCNC: 1.6 G/DL
RBC # BLD AUTO: 2.54 10E12/L (ref 4.4–5.9)
SODIUM SERPL-SCNC: 135 MMOL/L (ref 133–144)
SPECIMEN SOURCE FLD: NORMAL
SPECIMEN SOURCE: NORMAL
WBC # BLD AUTO: 2.6 10E9/L (ref 4–11)
WBC # FLD AUTO: 198 /UL
WBC # FLD AUTO: NORMAL /UL

## 2021-02-12 PROCEDURE — 250N000013 HC RX MED GY IP 250 OP 250 PS 637: Performed by: HOSPITALIST

## 2021-02-12 PROCEDURE — 97165 OT EVAL LOW COMPLEX 30 MIN: CPT | Mod: GO | Performed by: OCCUPATIONAL THERAPIST

## 2021-02-12 PROCEDURE — 97535 SELF CARE MNGMENT TRAINING: CPT | Mod: GO | Performed by: OCCUPATIONAL THERAPIST

## 2021-02-12 PROCEDURE — 82042 OTHER SOURCE ALBUMIN QUAN EA: CPT | Performed by: HOSPITALIST

## 2021-02-12 PROCEDURE — 87070 CULTURE OTHR SPECIMN AEROBIC: CPT | Performed by: HOSPITALIST

## 2021-02-12 PROCEDURE — 120N000001 HC R&B MED SURG/OB

## 2021-02-12 PROCEDURE — 89051 BODY FLUID CELL COUNT: CPT | Performed by: HOSPITALIST

## 2021-02-12 PROCEDURE — 999N000154 HC STATISTIC RADIOLOGY XRAY, US, CT, MAR, NM

## 2021-02-12 PROCEDURE — 99232 SBSQ HOSP IP/OBS MODERATE 35: CPT | Performed by: INTERNAL MEDICINE

## 2021-02-12 PROCEDURE — 0W9G3ZZ DRAINAGE OF PERITONEAL CAVITY, PERCUTANEOUS APPROACH: ICD-10-PCS | Performed by: RADIOLOGY

## 2021-02-12 PROCEDURE — 85610 PROTHROMBIN TIME: CPT | Performed by: PHYSICIAN ASSISTANT

## 2021-02-12 PROCEDURE — 250N000011 HC RX IP 250 OP 636: Performed by: INTERNAL MEDICINE

## 2021-02-12 PROCEDURE — 49083 ABD PARACENTESIS W/IMAGING: CPT

## 2021-02-12 PROCEDURE — 250N000013 HC RX MED GY IP 250 OP 250 PS 637: Performed by: PHYSICIAN ASSISTANT

## 2021-02-12 PROCEDURE — 87205 SMEAR GRAM STAIN: CPT | Performed by: HOSPITALIST

## 2021-02-12 PROCEDURE — P9047 ALBUMIN (HUMAN), 25%, 50ML: HCPCS | Performed by: HOSPITALIST

## 2021-02-12 PROCEDURE — 80069 RENAL FUNCTION PANEL: CPT | Performed by: PHYSICIAN ASSISTANT

## 2021-02-12 PROCEDURE — 83735 ASSAY OF MAGNESIUM: CPT | Performed by: PHYSICIAN ASSISTANT

## 2021-02-12 PROCEDURE — 85027 COMPLETE CBC AUTOMATED: CPT | Performed by: PHYSICIAN ASSISTANT

## 2021-02-12 PROCEDURE — 97116 GAIT TRAINING THERAPY: CPT | Mod: GP

## 2021-02-12 PROCEDURE — 97112 NEUROMUSCULAR REEDUCATION: CPT | Mod: GP

## 2021-02-12 PROCEDURE — 97161 PT EVAL LOW COMPLEX 20 MIN: CPT | Mod: GP

## 2021-02-12 PROCEDURE — 36415 COLL VENOUS BLD VENIPUNCTURE: CPT | Performed by: PHYSICIAN ASSISTANT

## 2021-02-12 PROCEDURE — 87015 SPECIMEN INFECT AGNT CONCNTJ: CPT | Performed by: HOSPITALIST

## 2021-02-12 PROCEDURE — 97530 THERAPEUTIC ACTIVITIES: CPT | Mod: GO | Performed by: OCCUPATIONAL THERAPIST

## 2021-02-12 PROCEDURE — 84157 ASSAY OF PROTEIN OTHER: CPT | Performed by: HOSPITALIST

## 2021-02-12 PROCEDURE — 250N000011 HC RX IP 250 OP 636: Performed by: HOSPITALIST

## 2021-02-12 PROCEDURE — P9047 ALBUMIN (HUMAN), 25%, 50ML: HCPCS | Performed by: INTERNAL MEDICINE

## 2021-02-12 RX ORDER — LIDOCAINE HYDROCHLORIDE 10 MG/ML
10 INJECTION, SOLUTION EPIDURAL; INFILTRATION; INTRACAUDAL; PERINEURAL ONCE
Status: COMPLETED | OUTPATIENT
Start: 2021-02-12 | End: 2021-02-12

## 2021-02-12 RX ORDER — ALBUMIN (HUMAN) 12.5 G/50ML
12.5 SOLUTION INTRAVENOUS ONCE
Status: COMPLETED | OUTPATIENT
Start: 2021-02-12 | End: 2021-02-12

## 2021-02-12 RX ADMIN — VORTIOXETINE 10 MG: 10 TABLET, FILM COATED ORAL at 08:08

## 2021-02-12 RX ADMIN — ATENOLOL 25 MG: 25 TABLET ORAL at 08:08

## 2021-02-12 RX ADMIN — FUROSEMIDE 20 MG: 10 INJECTION, SOLUTION INTRAVENOUS at 06:15

## 2021-02-12 RX ADMIN — QUETIAPINE 50 MG: 25 TABLET ORAL at 15:52

## 2021-02-12 RX ADMIN — PANTOPRAZOLE SODIUM 40 MG: 40 TABLET, DELAYED RELEASE ORAL at 08:09

## 2021-02-12 RX ADMIN — LIDOCAINE HYDROCHLORIDE 10 ML: 10 INJECTION, SOLUTION EPIDURAL; INFILTRATION; INTRACAUDAL; PERINEURAL at 09:28

## 2021-02-12 RX ADMIN — HYDROXYZINE HYDROCHLORIDE 50 MG: 25 TABLET, FILM COATED ORAL at 22:03

## 2021-02-12 RX ADMIN — MULTIPLE VITAMINS W/ MINERALS TAB 1 TABLET: TAB at 08:08

## 2021-02-12 RX ADMIN — THIAMINE HCL TAB 100 MG 100 MG: 100 TAB at 08:08

## 2021-02-12 RX ADMIN — TRAZODONE HYDROCHLORIDE 100 MG: 100 TABLET ORAL at 21:51

## 2021-02-12 RX ADMIN — PANTOPRAZOLE SODIUM 40 MG: 40 TABLET, DELAYED RELEASE ORAL at 21:53

## 2021-02-12 RX ADMIN — MIRTAZAPINE 30 MG: 15 TABLET, FILM COATED ORAL at 21:48

## 2021-02-12 RX ADMIN — QUETIAPINE 50 MG: 25 TABLET ORAL at 08:17

## 2021-02-12 RX ADMIN — ALBUMIN HUMAN 25 G: 0.25 SOLUTION INTRAVENOUS at 08:12

## 2021-02-12 RX ADMIN — QUETIAPINE FUMARATE 100 MG: 100 TABLET ORAL at 21:56

## 2021-02-12 RX ADMIN — CARBIDOPA AND LEVODOPA 7.5 MG: 50; 200 TABLET, EXTENDED RELEASE ORAL at 12:27

## 2021-02-12 RX ADMIN — ALBUMIN HUMAN 25 G: 0.25 SOLUTION INTRAVENOUS at 01:46

## 2021-02-12 RX ADMIN — TAMSULOSIN HYDROCHLORIDE 0.8 MG: 0.4 CAPSULE ORAL at 08:09

## 2021-02-12 RX ADMIN — THIAMINE HCL TAB 100 MG 100 MG: 100 TAB at 21:55

## 2021-02-12 RX ADMIN — FOLIC ACID 1 MG: 1 TABLET ORAL at 08:08

## 2021-02-12 RX ADMIN — CARBIDOPA AND LEVODOPA 7.5 MG: 50; 200 TABLET, EXTENDED RELEASE ORAL at 17:27

## 2021-02-12 RX ADMIN — CARBIDOPA AND LEVODOPA 7.5 MG: 50; 200 TABLET, EXTENDED RELEASE ORAL at 08:08

## 2021-02-12 RX ADMIN — ALBUMIN HUMAN 12.5 G: 0.25 SOLUTION INTRAVENOUS at 17:27

## 2021-02-12 RX ADMIN — THIAMINE HCL TAB 100 MG 100 MG: 100 TAB at 15:52

## 2021-02-12 ASSESSMENT — ACTIVITIES OF DAILY LIVING (ADL)
ADLS_ACUITY_SCORE: 13
PREVIOUS_RESPONSIBILITIES: MEAL PREP;HOUSEKEEPING;LAUNDRY;SHOPPING;FINANCES;MEDICATION MANAGEMENT;DRIVING
ADLS_ACUITY_SCORE: 11
ADLS_ACUITY_SCORE: 13
ADLS_ACUITY_SCORE: 11

## 2021-02-12 ASSESSMENT — MIFFLIN-ST. JEOR: SCORE: 1553.63

## 2021-02-12 NOTE — PROGRESS NOTES
Paracentesis   3700 Straw fluid removed from abdominal space  Patient tolerated procedure well.  Dressing CDI, no swelling or hematoma.

## 2021-02-12 NOTE — PROGRESS NOTES
02/12/21 1202   Quick Adds   Type of Visit Initial Occupational Therapy Evaluation   Living Environment   People in home other (see comments)  (has a roommate, lives in a townhouse)   Current Living Arrangements house   Home Accessibility stairs to enter home;stairs within home   Number of Stairs, Main Entrance 6   Stair Railings, Main Entrance railings safe and in good condition   Number of Stairs, Within Home, Primary other (see comments)  (split leve, 6 up and 6 down)   Stair Railings, Within Home, Primary railings safe and in good condition   Transportation Anticipated car, drives self   Living Environment Comments pt has tub shower with grab bars, stands to shower   Self-Care   Usual Activity Tolerance good   Current Activity Tolerance fair   Regular Exercise   (does some ex's on a exercise ball for his back)   Equipment Currently Used at Home grab bar, tub/shower;walker, rolling   Activity/Exercise/Self-Care Comment pt was I with ADLs and IADL's including driving.    General Information   Onset of Illness/Injury or Date of Surgery 02/09/21   Referring Physician Rupinder Orozco PA-C   Patient/Family Therapy Goal Statement (OT) I'm not sure where I will go   Additional Occupational Profile Info/Pertinent History of Current Problem pt admitted through ED for abd distension and acute encephalopathy in the setting of ETOH intoxication. Under full CD commitment.    Performance Patterns (Routines, Roles, Habits) retired   Existing Precautions/Restrictions fall   General Observations and Info pt finishing up with nursing, agreeable to OT eval   Cognitive Status Examination   Orientation Status orientation to person, place and time   Cognitive Status Comments will continue to montior. pt states his STM is poor. reports difficulty managing medications   Visual Perception   Visual Impairment/Limitations WNL;corrective lenses for reading   Sensory   Sensory Comments R big toe is numb   Pain Assessment   Patient  Currently in Pain No   Integumentary/Edema   Integumentary/Edema Comments a little B LE edema noted   Range of Motion Comprehensive   General Range of Motion no range of motion deficits identified   Strength Comprehensive (MMT)   General Manual Muscle Testing (MMT) Assessment no strength deficits identified   Muscle Tone Assessment   Muscle Tone Quick Adds No deficits were identified   Coordination   Upper Extremity Coordination No deficits were identified   Bed Mobility   Comment (Bed Mobility) SBA   Transfers   Transfer Comments used FWW and SBA with BR transfers    Balance   Balance Comments decreased balance noted and pt states he does not feel at baseline with balance. defer former assessment to PT   Activities of Daily Living   BADL Assessment/Intervention lower body dressing;toileting   Lower Body Dressing Assessment/Training   Child[place] Level (Lower Body Dressing) set up;supervision   Comment (Lower Body Dressing) able to do in figure four sitting   Toileting   Child[place] Level (Toileting) supervision   Instrumental Activities of Daily Living (IADL)   Previous Responsibilities meal prep;housekeeping;laundry;shopping;finances;medication management;driving   Clinical Impression   Criteria for Skilled Therapeutic Interventions Met (OT) yes;skilled treatment is necessary   OT Diagnosis decreased I with ADL's and functional mobility   OT Problem List-Impairments impacting ADL problems related to;activity tolerance impaired;pain;cognition;balance   Assessment of Occupational Performance 1-3 Performance Deficits   Identified Performance Deficits decreased dressing, home mgmt, managing meds, decreased cog, decreased BR transfers   Planned Therapy Interventions (OT) ADL retraining;IADL retraining;cognition;transfer training;strengthening;progressive activity/exercise   Clinical Decision Making Complexity (OT) moderate complexity   Therapy Frequency (OT) Daily   Predicted Duration of Therapy 5 days   Risk &  Benefits of therapy have been explained evaluation/treatment results reviewed;care plan/treatment goals reviewed;risks/benefits reviewed;current/potential barriers reviewed;participants voiced agreement with care plan;participants included;patient   OT Discharge Planning    OT Discharge Recommendation (DC Rec) Transitional Care Facility   OT Rationale for DC Rec pt is below basline with balance, functional transers and ADL's. decreased cognition, pt reports difficulty managing medications at home because he forgets to take them despite using a pill box. pt demo's decreased balance, needing a walker for ambulation, typically does not need to use a walker at home. pt would benefit from TCU to address deficits, however pending length of stay, may progress to baseline. defer balance testing and rec to PT eval.    Total Evaluation Time (Minutes)   Total Evaluation Time (Minutes) 10

## 2021-02-12 NOTE — PROGRESS NOTES
02/12/21 1549   Quick Adds   Type of Visit Initial PT Evaluation   Living Environment   People in home other (see comments)  (Roommate)   Current Living Arrangements house  ([facility])   Home Accessibility stairs to enter home;stairs within home   Number of Stairs, Main Entrance 6   Stair Railings, Main Entrance railings safe and in good condition   Number of Stairs, Within Home, Primary 6   Stair Railings, Within Home, Primary railings safe and in good condition   Transportation Anticipated car, drives self   Living Environment Comments Patient lives in a split level home with a roommate that would not be able to provide him assist.    Self-Care   Usual Activity Tolerance good   Current Activity Tolerance moderate   Equipment Currently Used at Home grab bar, tub/shower;walker, rolling   Activity/Exercise/Self-Care Comment Independent with all mobility   Disability/Function   Hearing Difficulty or Deaf no   Wear Glasses or Blind no   Concentrating, Remembering or Making Decisions Difficulty no   Difficulty Communicating no   Difficulty Eating/Swallowing no   Walking or Climbing Stairs Difficulty no   Doing Errands Independently Difficulty (such as shopping) no   Fall history within last six months yes   Number of times patient has fallen within last six months 1   General Information   Onset of Illness/Injury or Date of Surgery 02/09/21   Referring Physician Rupinder Orozco PA-C   Patient/Family Therapy Goals Statement (PT) to be able to walk around without assistive device   Pertinent History of Current Problem (include personal factors and/or comorbidities that impact the POC) Patient is 66 YO M admitted with abdominal distention due to recurrent ascites and EtOH intoxication. He is committed for Chem dep. PMH: chronic alcohol use, liver cirrhosis, COPD, CKD   Existing Precautions/Restrictions fall   Weight-Bearing Status - LLE full weight-bearing   Weight-Bearing Status - RLE full weight-bearing    General Observations Activity orders: up ad eliane   Cognition   Orientation Status (Cognition) oriented x 4   Affect/Mental Status (Cognition) WFL   Follows Commands (Cognition) WFL   Pain Assessment   Patient Currently in Pain No   Posture    Posture Forward head position   Range of Motion (ROM)   ROM Comment B LE WFL   Strength   Strength Comments Generalized weakness, B hip flexion 4/5   Bed Mobility   Bed Mobility supine-sit;sit-supine   Supine-Sit Strawn (Bed Mobility) independent   Sit-Supine Strawn (Bed Mobility) independent   Transfers   Transfers sit-stand transfer   Transfer Safety Concerns Noted decreased balance during turns   Impairments Contributing to Impaired Transfers impaired balance;decreased strength   Sit-Stand Transfer   Sit-Stand Strawn (Transfers) supervision   Assistive Device (Sit-Stand Transfers) walker, front-wheeled   Sit/Stand Transfer Comments Sit <> stand from bed with FWW, tremors and mild unsteadiness   Gait/Stairs (Locomotion)   Strawn Level (Gait) supervision   Assistive Device (Gait) walker, front-wheeled   Distance in Feet (Required for LE Total Joints) 15' during eval + 280' during treatment   Pattern (Gait) step-through   Deviations/Abnormal Patterns (Gait) gait speed decreased;stride length decreased  (lateral trunk sway; apparent L leg length discrepancy)   Balance   Balance other (describe)   Balance Comments Good sitting balance; Trunk sway and SBA for stating standing; Min assist for eyes closed; Min assist for NBOS   Sensory Examination   Sensory Perception Comments Chronic numbness R great toe; otherwise B LE grossly in tact to light touch   Coordination   Coordination no deficits were identified   Muscle Tone   Muscle Tone Comments Tremors in B UE   Clinical Impression   Criteria for Skilled Therapeutic Intervention yes, treatment indicated   PT Diagnosis (PT) gait instability   Influenced by the following impairments tremors, impaired balance,  generalized weakness   Functional limitations due to impairments increased difficulty with transfers, gait   Clinical Presentation Stable/Uncomplicated   Clinical Presentation Rationale stable vital signs, clinical judgement   Clinical Decision Making (Complexity) low complexity   Therapy Frequency (PT) Daily   Predicted Duration of Therapy Intervention (days/wks) 4 days   Planned Therapy Interventions (PT) balance training;gait training;home exercise program;neuromuscular re-education;stair training;strengthening;transfer training   Risk & Benefits of therapy have been explained evaluation/treatment results reviewed;care plan/treatment goals reviewed;risks/benefits reviewed;current/potential barriers reviewed;participants voiced agreement with care plan;participants included;patient   PT Discharge Planning    PT Discharge Recommendation (DC Rec) Transitional Care Facility   PT Rationale for DC Rec Patient below baseline and requiring use of FWW to ambulate at this time, would be unable to navigate at home due to multi-level house without assist; Patient currently hoping to progress to be able to return home   PT Brief overview of current status  Mod I supine <>sit; SBA sit <> stand tranfers; SBA ambulating with FWW   Total Evaluation Time   Total Evaluation Time (Minutes) 9

## 2021-02-12 NOTE — PROGRESS NOTES
[facility]    Medicine Progress Note - Hospitalist Service       Date of Admission:  2/9/2021  Assessment & Plan       Jim Richard is a 65 year old male with hx of alcoholic cirrhosis with ongoing alcohol use, HTN, CKD, COPD with ongoing tobacco use, recent GIB, and depression/anxiety who presents to the Emergency Department for evaluation of abdominal distension and acute encephalopathy in the setting of alcohol intoxication.      Recurrent large volume ascites due to cirrhosis   Note is made of recurrent ascites requiring paracenteses every 2 weeks; the last time was 2/3/2021 as an outpatient (2.4 L removed). He is not on diuretics due to CKD reportedly. He presents for abdominal distension, without other signs of SBP. His Lipase is mildly elevated but not to a degree consistent with acute pancreatitis. He continues to drink alcohol.  --s/p paracentesis 2/9/21 with removal of 3350cc fluid.   -- plan for paracentesis again today  -- will one more dose of albumin this afternoon after paracentesis given his increasing creatinine       Acute toxic encephalopathy due to alcohol intoxication, resolved  Alcohol abuse with acute alcohol withdrawal    Causing mild ongoing alcoholic hepatitis. Etoh on admission 0.33.  Reports drinking 750ml vodka daily. Notably here with lengthy hospitalization 2020 and patient ultimately placed on commitment 11/2020 through May through [facility].   Patient reports he was supposed to present to [facility] for treatment last week or this week but did not. He is now alert and oriented.   --CIWA with Ativan ordered- symptoms managed on current regimen   --Thiamine, Folate, MVI   --social work consult for dispo assistance. Will have therapies see to assist with disposition      Hyponatremia. corrected  --f/u BMP     Prolonged QTc, resolved  EKG on admission with QTc of 509, improved to 468 after IVF and magnesium replacement. On multiple prolonging  psychiatric medications PTA.   --follow electrolytes  --PTA meds resumed      BPH with urinary retention: Reports ongoing issues with urinary retention at home though this is in the setting of non compliance with flomax. During recent admission flomax dose increased 0.4>0.8.   --continue flomax  --monitor PVRs which seem to be improving      Chronic anemia and thrombocytopenia:   Recent GIB  Note is made of prior banding of esophageal varices in 2018. Most recently he was hospitalized here from 1/22/2021 to 1/27/2021 for alcohol withdrawal and melena causing acute blood loss anemia (HGB fabiola 7.5). [facility] GI was consulted and repeat EGD showed esophagitis, grade I esophageal varices, portal hypertensive gastropathy, and erythematous duodenopathy, but no active bleeding.     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 Cr 1.9 up from 1.6 on admission and stable.   --received albumin x3 with low dose IV lasix x 2 on 2/11, creatinine bumped to 2.16  -- hold further diuretics  -- Albumin x 2 doses after paracentesis today  -- f/u BMP in AM     Lactic acidosis, acute: Likely due to alcoholic ketoacidosis. Monitor as needed     Essential hypertension  -  resume Atenolol with hold parameters     COPD  Resume home inhalers. No evidence of acute exacerbation.       JANIS  - Continue CPAP     Chronic depression and anxiety: He declines Psychiatry consult for now. He says he has an appointment with his Psychiatrist in 2 days.  PTA: vortioxetine, mirtazapine, and trazodone, and seroquel   --discussed with Dr. Rand's clinic, patient advised to follow up at [facility] following discharge   --resume trazodone, Remeron as QTc has improved  --continue Seroquel for now      Severe malnutrition  In Context of:  Chronic illness or disease  Environmental or social circumstances  % Weight Loss:  None noted  % Intake:  </= 75% for >/= 1 month (severe  malnutrition)  Subcutaneous Fat Loss:  Orbital region mild depletion and Upper arm region moderate depletion  Muscle Loss:  Temporal region mild depletion and Clavicle bone region mild depletion  Fluid Retention:  Moderate   - Chocolate shake BID between meals   - Continue micronutrients as ordered   --nutrition consulted        Diet: Advance Diet as Tolerated: Regular Diet Adult; 2 gm NA Diet  Snacks/Supplements Adult: Other; Chocolate Shake BID; Between Meals    DVT Prophylaxis: Pneumatic Compression Devices  Leger Catheter: not present  Code Status: Full Code           Disposition Plan   Expected discharge: 1-2 days, recommended to TBD pendinng therapy eval once stable renal function.  Entered: Chely Calhoun MD 02/12/2021, 8:35 AM       The patient's care was discussed with the Bedside Nurse, Care Coordinator/ and Patient.    Chely Calhoun MD  Hospitalist Service  [facility]  Contact information available via [facility] Paging/Directory    ______________________________________________________________________    Interval History   Patient denies abdominal pain, nausea or vomiting. Denies headache. He reports generalized weakness. He is afebrile.   Plan for paracentesis today.     Data reviewed today: I reviewed all medications, new labs and imaging results over the last 24 hours. I personally reviewed no images or EKG's today.    Physical Exam   Vital Signs: Temp: 98.4  F (36.9  C) Temp src: Oral BP: 122/70 Pulse: 64   Resp: 16 SpO2: 99 % O2 Device: None (Room air)    Weight: 179 lbs 10.8 oz  General Appearance: Alert, awake and no apparent distress  Respiratory: clear to auscultation bilaterally, no wheezing  Cardiovascular: regular rate and rhythm, trace LE edema bilaterally  GI: soft, +distendion, non-tender, +umblical hernia   Skin: warm and dry      Data   Recent Labs   Lab 02/12/21  0809 02/11/21  0747 02/10/21  0835 02/09/21  0223 02/09/21 0223   WBC 2.6*   --   --   --  4.0   HGB 7.7*  --   --   --  8.5*   MCV 92  --   --   --  96   PLT 73*  --   --   --  122*   INR 1.34*  --   --   --  1.16*    129* 132*  --  142   POTASSIUM 3.5 3.7 3.7  --  4.2   CHLORIDE 103 99 102  --  111*   CO2 25 23 24  --  23   BUN 28 27 33*  --  34*   CR 2.16* 1.94* 1.92*  --  1.63*   ANIONGAP 7 7 6  --  8   KEV 8.1* 8.5 7.8*  --  7.7*   GLC 95 100* 127*  --  93   ALBUMIN 3.0*  --  2.2*   < > 2.8*   PROTTOTAL  --   --  5.3*  --  6.6*   BILITOTAL  --   --  1.1  --  0.5   ALKPHOS  --   --  193*  --  273*   ALT  --   --  30  --  45   AST  --   --  56*  --  101*   LIPASE  --   --   --   --  799*   TROPI  --   --   --   --  <0.015    < > = values in this interval not displayed.     No results found for this or any previous visit (from the past 24 hour(s)).  Medications       albumin human  12.5 g Intravenous Once     atenolol  25 mg Oral Daily     folic acid  1 mg Oral Daily     lidocaine (buffered or not buffered)  10 mL Intradermal Once     midodrine  7.5 mg Oral TID w/meals     mirtazapine  30 mg Oral At Bedtime     multivitamin w/minerals  1 tablet Oral Daily     pantoprazole  40 mg Oral BID     QUEtiapine  100 mg Oral At Bedtime     sodium chloride (PF)  3 mL Intracatheter Q8H     tamsulosin  0.8 mg Oral Daily     thiamine  100 mg Oral TID    Followed by     [START ON 2/16/2021] thiamine  100 mg Oral Daily     traZODone  100 mg Oral At Bedtime     vortioxetine  10 mg Oral Daily

## 2021-02-12 NOTE — PROGRESS NOTES
Patient is on US schedule today Friday 2/12/2021 for a Diagnostic and therapeutic paracentesis (labs in).     -Labs WNL for procedure.    -No NPO required.   -Consent for this admission encounter is current, no further consent needed today.      Please contact the US department at [phone] for procedural related questions.     Thanks Kendra Leitschuh CNP Interventional Radiology ([phone])

## 2021-02-12 NOTE — PLAN OF CARE
DATE & TIME: 2/11/21 7658-2581  Cognitive Concerns/ Orientation : Pt A/Ox4, forgetful.   BEHAVIOR & AGGRESSION TOOL COLOR: Green  CIWA SCORE: 11 (recheck 7), 8 (recheck 6), & 7. Ativan given 2x d/t anxiety, tremor and headache.   ABNL VS/O2: VSS on RA.   MOBILITY: Assist x1 with walker and gait belt, fall risk. Bed alarm on.   PAIN MANAGMENT: Denies.   DIET: 2gm Na, tolerating well.   BOWEL/BLADDER: Incontinent at times. No issue w/ retention overnight.   ABNL LAB/BG: Hgb 8.5, Na+ 129, Creat 1.94, Albumin 2.2, Platelets 122   DRAIN/DEVICES: SL  SKIN: Bruising, blotchy skin. Abdomen rounded/taut. Hernia at umbilicus.  TESTS/PROCEDURES:  Paracentesis this AM.   D/C DAY/GOALS/PLACE: Discharge pending progress. PT, OT, CC, & SW following.

## 2021-02-12 NOTE — PROGRESS NOTES
Care Management Follow Up    Length of Stay (days): 3    Expected Discharge Date: 02/12/21     Concerns to be Addressed:       Patient plan of care discussed at interdisciplinary rounds: Yes    Anticipated Discharge Disposition:       Anticipated Discharge Services:    Anticipated Discharge DME:      Patient/family educated on Medicare website which has current facility and service quality ratings:    Education Provided on the Discharge Plan:    Patient/Family in Agreement with the Plan:      Referrals Placed by CM/SW:    Private pay costs discussed:     Additional Information:  Writer is acknowledging the CT consult. This writer is very familiar with patient.  Patient is under a Full CD commitment.  He has had a relapse.  His Behavioral Health  Omayra Nelson is back from a vacation.Writer will reach out to her today to review that Jim may need a TCU stay before addressing his relapse plan.  Writer will complete the formal consult once writer has spoken with Omar.    TNAA CejaSW

## 2021-02-12 NOTE — PLAN OF CARE
DATE & TIME: 2/12/21 7265-4270  Cognitive Concerns/ Orientation : Pt A/Ox4, forgetful.   BEHAVIOR & AGGRESSION TOOL COLOR: Green  CIWA SCORE: 7 & 6 (tremor & anxiety)  ABNL VS/O2: VSS on RA.   MOBILITY: Assist x1 with walker and gait belt, fall risk. Bed alarm on. Walked in muniz w/ PT.   PAIN MANAGMENT: Denies.   DIET: 2gm Na, tolerating well.   BOWEL/BLADDER: Incontinent at times. No issue w/ retention this shift. X1 BM this shift.  ABNL LAB/BG: Hgb 7.7, Creat 2.16, Albumin 3.0, Platelets 73   DRAIN/DEVICES: SL  SKIN: Bruising, blotchy skin. Abdomen rounded/taut. Hernia at umbilicus.  TESTS/PROCEDURES:  Paracentesis completed today, removed 3.7 L.   D/C DAY/GOALS/PLACE: Discharge pending progress. Most likely to TCU per OT.  OTHER: PT, OT, CC, & SW following.

## 2021-02-13 ENCOUNTER — APPOINTMENT (OUTPATIENT)
Dept: OCCUPATIONAL THERAPY | Facility: CLINIC | Age: 66
DRG: 432 | End: 2021-02-13
Payer: MEDICARE

## 2021-02-13 VITALS
BODY MASS INDEX: 27.51 KG/M2 | TEMPERATURE: 98.3 F | DIASTOLIC BLOOD PRESSURE: 66 MMHG | RESPIRATION RATE: 18 BRPM | SYSTOLIC BLOOD PRESSURE: 139 MMHG | HEART RATE: 56 BPM | HEIGHT: 67 IN | WEIGHT: 175.27 LBS | OXYGEN SATURATION: 98 %

## 2021-02-13 LAB
ANION GAP SERPL CALCULATED.3IONS-SCNC: 4 MMOL/L (ref 3–14)
BUN SERPL-MCNC: 32 MG/DL (ref 7–30)
CALCIUM SERPL-MCNC: 8 MG/DL (ref 8.5–10.1)
CHLORIDE SERPL-SCNC: 103 MMOL/L (ref 94–109)
CO2 SERPL-SCNC: 28 MMOL/L (ref 20–32)
CREAT SERPL-MCNC: 2.15 MG/DL (ref 0.66–1.25)
GFR SERPL CREATININE-BSD FRML MDRD: 31 ML/MIN/{1.73_M2}
GLUCOSE SERPL-MCNC: 117 MG/DL (ref 70–99)
POTASSIUM SERPL-SCNC: 3.7 MMOL/L (ref 3.4–5.3)
SODIUM SERPL-SCNC: 135 MMOL/L (ref 133–144)

## 2021-02-13 PROCEDURE — 97535 SELF CARE MNGMENT TRAINING: CPT | Mod: GO | Performed by: OCCUPATIONAL THERAPIST

## 2021-02-13 PROCEDURE — 80048 BASIC METABOLIC PNL TOTAL CA: CPT | Performed by: INTERNAL MEDICINE

## 2021-02-13 PROCEDURE — 36415 COLL VENOUS BLD VENIPUNCTURE: CPT | Performed by: INTERNAL MEDICINE

## 2021-02-13 PROCEDURE — 250N000013 HC RX MED GY IP 250 OP 250 PS 637: Performed by: HOSPITALIST

## 2021-02-13 PROCEDURE — 99239 HOSP IP/OBS DSCHRG MGMT >30: CPT | Performed by: INTERNAL MEDICINE

## 2021-02-13 PROCEDURE — 250N000013 HC RX MED GY IP 250 OP 250 PS 637: Performed by: PHYSICIAN ASSISTANT

## 2021-02-13 PROCEDURE — 97530 THERAPEUTIC ACTIVITIES: CPT | Mod: GO | Performed by: OCCUPATIONAL THERAPIST

## 2021-02-13 RX ADMIN — PANTOPRAZOLE SODIUM 40 MG: 40 TABLET, DELAYED RELEASE ORAL at 09:09

## 2021-02-13 RX ADMIN — QUETIAPINE 50 MG: 25 TABLET ORAL at 09:30

## 2021-02-13 RX ADMIN — CARBIDOPA AND LEVODOPA 7.5 MG: 50; 200 TABLET, EXTENDED RELEASE ORAL at 09:09

## 2021-02-13 RX ADMIN — VORTIOXETINE 10 MG: 10 TABLET, FILM COATED ORAL at 09:09

## 2021-02-13 RX ADMIN — QUETIAPINE 50 MG: 25 TABLET ORAL at 15:43

## 2021-02-13 RX ADMIN — THIAMINE HCL TAB 100 MG 100 MG: 100 TAB at 15:02

## 2021-02-13 RX ADMIN — CARBIDOPA AND LEVODOPA 7.5 MG: 50; 200 TABLET, EXTENDED RELEASE ORAL at 11:52

## 2021-02-13 RX ADMIN — MULTIPLE VITAMINS W/ MINERALS TAB 1 TABLET: TAB at 09:09

## 2021-02-13 RX ADMIN — TAMSULOSIN HYDROCHLORIDE 0.8 MG: 0.4 CAPSULE ORAL at 09:09

## 2021-02-13 RX ADMIN — FOLIC ACID 1 MG: 1 TABLET ORAL at 09:09

## 2021-02-13 RX ADMIN — THIAMINE HCL TAB 100 MG 100 MG: 100 TAB at 09:09

## 2021-02-13 ASSESSMENT — MIFFLIN-ST. JEOR: SCORE: 1533.63

## 2021-02-13 ASSESSMENT — ACTIVITIES OF DAILY LIVING (ADL)
ADLS_ACUITY_SCORE: 11

## 2021-02-13 NOTE — DISCHARGE SUMMARY
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  The following labs/tests are recommended: basic   metabolic panel.             Unresulted Labs Ordered in the Past 30 Days of this Admission     Date and Time Order Name Status Description    2/12/2021 0202 Fluid Culture Aerobic Bacterial Preliminary     2/9/2021 0452 Fluid Culture Aerobic Bacterial Preliminary           Discharge Disposition   Discharged to home  Condition at discharge: Stable    Hospital Course     Jim Richard is a 65 year old male with hx of alcoholic cirrhosis with ongoing alcohol use, HTN, CKD, COPD with ongoing tobacco use, recent GIB, and depression/anxiety who presents to the Emergency Department for evaluation of abdominal distension and acute encephalopathy in the setting of alcohol intoxication.      Recurrent large volume ascites due to cirrhosis   Note is made of recurrent ascites requiring paracenteses every 2 weeks; the last time was 2/3/2021 as an outpatient (2.4 L removed). He is not on diuretics due to CKD reportedly. He presents for abdominal distension, without other signs of SBP. His  Lipase is mildly elevated but not to a degree consistent with acute pancreatitis. He continues to drink alcohol.  --s/p paracentesis 2/9/21 with removal of 3350cc fluid.   -- plan for paracentesis again today  -- will one more dose of albumin this afternoon after paracentesis given his increasing creatinine    Acute toxic encephalopathy due to alcohol intoxication, resolved  Alcohol abuse/dependence with acute alcohol withdrawal    Causing mild ongoing alcoholic hepatitis. Etoh on admission 0.33.  Reports drinking 750ml vodka daily. Notably here with lengthy hospitalization 2020 and patient ultimately placed on commitment 11/2020 through May through [facility]. Patient reports he was supposed to present to [facility] for treatment last week or this week but did not. He is now alert and oriented.   --no signs of alcohol withdrawal at time of discharge     Hyponatremia. corrected     Prolonged QTc, resolved  EKG on admission with QTc of 509, improved to 468 after IVF and magnesium replacement. On multiple prolonging psychiatric medications PTA.   --PTA meds resumed      BPH with urinary retention: Reports ongoing issues with urinary retention at home though this is in the setting of non compliance with flomax. During recent admission flomax dose increased 0.4>0.8.   --continue flomax  --currently voiding     Chronic anemia and thrombocytopenia:   Recent GIB  Note is made of prior banding of esophageal varices in 2018. Most recently he was hospitalized here from 1/22/2021 to 1/27/2021 for alcohol withdrawal and melena causing acute blood loss anemia (HGB fabiola 7.5). [facility] was consulted and repeat EGD showed esophagitis, grade I esophageal varices, portal hypertensive gastropathy, and erythematous duodenopathy, but no active bleeding.     -- continue follow up with GI at discharge     Acute on CKD Stage 3-4: Baseline Cr 1.4-1.9. Cr 1.9 up from 1.6 on admission and stable.   --received albumin x3 with low dose IV  lasix x 2 on 2/11, creatinine bumped to 2.16  -- has received albumin  -- creatine remains stable at 2.16  -- f/u BMP in a week with primary care provider     Lactic acidosis, acute: Likely due to alcoholic ketoacidosis.      Essential hypertension  - continue with atenolol     COPD  Resume home inhalers. No evidence of acute exacerbation.       JANIS  - Continue CPAP     Chronic depression and anxiety: He declines Psychiatry consult for now. He says he has an appointment with his Psychiatrist in 2 days.  PTA: vortioxetine, mirtazapine, and trazodone, and seroquel   --discussed with Dr. Rand's clinic, patient advised to follow up at [facility] following discharge   --resume PTA meds     Severe malnutrition  In Context of:  Chronic illness or disease  Environmental or social circumstances  % Weight Loss:  None noted  % Intake:  </= 75% for >/= 1 month (severe malnutrition)  Subcutaneous Fat Loss:  Orbital region mild depletion and Upper arm region moderate depletion  Muscle Loss:  Temporal region mild depletion and Clavicle bone region mild depletion  Fluid Retention:  Moderate   - seen by dietitian this hospital stay    Physical deconditioning/generalized weakness  Physical therapy has evaluated and recommended TCU.  However patient declines and wants to go home.  Risk of falls explained to patient and expresses understanding.  Discharged home with home PT and RN    - Consultations This Hospital Stay   NUTRITION SERVICES ADULT IP CONSULT  SOCIAL WORK IP CONSULT  CARE MANAGEMENT / SOCIAL WORK IP CONSULT  PHYSICAL THERAPY ADULT IP CONSULT  OCCUPATIONAL THERAPY ADULT IP CONSULT    Code Status   Full Code    Time Spent on this Encounter   IChely MD, personally saw the patient today and spent 35 minutes discharging this patient.       Chely Calhoun MD  [place] [address] [facility]  [address] [place] [address]  Phone:  [phone]  ______________________________________________________________________    Physical Exam   Vital Signs: Temp: 98.3  F (36.8  C) Temp src: Oral BP: 139/66 Pulse: 56   Resp: 18 SpO2: 98 % O2 Device: None (Room air)    Weight: 175 lbs 4.25 oz  General Appearance: Alert, awake and no apparent distrerss  Respiratory: clear to auscultation bilatrally  Cardiovascular: regular rate and rhythm  GI: distended, soft, +umblical hernia  Skin: warm and dry          Primary Care Physician   FERNANDA BRANTLEY    Discharge Orders      Home care nursing referral      Home Care PT Referral for Hospital Discharge      Reason for your hospital stay    You were admitted secondary to ascites.     Follow-up and recommended labs and tests     Follow up with primary care provider, FERNANDA BRANTLEY, within 7 days for hospital follow- up.  The following labs/tests are recommended: basic metabolic panel.     Activity    Your activity upon discharge: activity as tolerated     MD face to face encounter    Documentation of Face to Face and Certification for Home Health Services    I certify that patient: Jim Richard is under my care and that I, or a nurse practitioner or physician's assistant working with me, had a face-to-face encounter that meets the physician face-to-face encounter requirements with this patient on: 2/13/2021.    This encounter with the patient was in whole, or in part, for the following medical condition, which is the primary reason for home health care:  Generalized weakness, cirrhosis with ascites.    I certify that, based on my findings, the following services are medically necessary home health services: Nursing and Physical Therapy.    My clinical findings support the need for the above services because: Nurse is needed: To assess appropriate medications use/medication tolerance after changes in medications or other medical regimen.and Physical Therapy Services are needed to assess and treat the  following functional impairments: to improve function mobility and gain independence    Further, I certify that my clinical findings support that this patient is homebound (i.e. absences from home require considerable and taxing effort and are for medical reasons or Baptism services or infrequently or of short duration when for other reasons) because: needs assistance leaving home due to generalized weakness/physical decondition and risk of falls.    Based on the above findings. I certify that this patient is confined to the home and needs intermittent skilled nursing care, physical therapy and/or speech therapy.  The patient is under my care, and I have initiated the establishment of the plan of care.  This patient will be followed by a physician who will periodically review the plan of care.  Physician/Provider to provide follow up care: Talon Elias    Attending hospital physician (the Medicare certified [place] provider): Chely Calhoun MD  Physician Signature: See electronic signature associated with these discharge orders.  Date: 2/13/2021     Full Code     Diet    Follow this diet upon discharge: low sodium diet       Significant Results and Procedures   Most Recent 3 CBC's:  Recent Labs   Lab Test 02/12/21  0809 02/09/21  0223 01/27/21  0734   WBC 2.6* 4.0 2.9*   HGB 7.7* 8.5* 7.8*   MCV 92 96 96   PLT 73* 122* 79*     Most Recent 3 BMP's:  Recent Labs   Lab Test 02/13/21  1204 02/12/21  0809 02/11/21  0747    135 129*   POTASSIUM 3.7 3.5 3.7   CHLORIDE 103 103 99   CO2 28 25 23   BUN 32* 28 27   CR 2.15* 2.16* 1.94*   ANIONGAP 4 7 7   KEV 8.0* 8.1* 8.5   * 95 100*   ,   Results for orders placed or performed during the hospital encounter of 02/09/21   US Paracentesis    Narrative    ULTRASOUND PARACENTESIS February 9, 2021 9:06 AM     HISTORY: High volume paracentesis with or without diagnostic fluid  analysis with labs to be drawn if ordered. Total paracentesis volume  as much  as possible.    FINDINGS: Ultrasound was used to evaluate for the presence and best  approach for paracentesis. Written and oral informed consent was  obtained. A pause for the cause procedure to verify the correct  patient and correct procedure. The skin overlying the right lower  quadrant was prepped and draped in the usual sterile fashion. The  subcutaneous tissues were anesthetized with 10 mL 1% subcutaneous  lidocaine. A catheter was advanced into the peritoneal space and 3.35  L of  straw colored fluid was drained. There were no immediate  complications. Ultrasound images were permanently stored. Patient left  the ultrasound suite in satisfactory condition.      Impression    IMPRESSION: Technically successful paracentesis without immediate  complications.    TACO LINDA MD   US Paracentesis    Narrative    US PARACENTESIS 2/12/2021 10:18 AM    CLINICAL HISTORY: HIGH VOLUME paracentesis with or without diagnostic  fluid analysis with labs to be drawn if ordered. Total paracentesis  volume as much as possible.    PROCEDURE: Informed consent obtained. Time out performed. The abdomen  was prepped and draped in a sterile fashion. 10 mL of 1% lidocaine was  infused into local soft tissues. A 5 French catheter system was  introduced into the abdominal ascites under ultrasound guidance.    3.7 liters of clear fluid were removed and sent to lab if requested.    Patient tolerated procedure well.    Ultrasound imaging was obtained and placed in the patient's permanent  medical record.      Impression    IMPRESSION:  1.  Status post ultrasound-guided paracentesis.    ANNE-MARIE PATEL MD       Discharge Medications   Current Discharge Medication List      CONTINUE these medications which have NOT CHANGED    Details   acetaminophen (TYLENOL) 325 MG tablet Take 2 tablets (650 mg) by mouth every 4 hours as needed for mild pain  Qty: 100 tablet, Refills: 0    Associated Diagnoses: Alcoholic intoxication without complication  (H)      atenolol (TENORMIN) 25 MG tablet Take 25 mg by mouth daily Filled 9/9/20 #30      fluticasone-vilanterol (BREO ELLIPTA) 200-25 MCG/INH inhaler Inhale 1 puff into the lungs daily as needed (SOB)  Qty: 28 each, Refills: 0    Associated Diagnoses: Alcoholic intoxication without complication (H)      folic acid (FOLVITE) 1 MG tablet Take 1 tablet (1 mg) by mouth daily  Qty: 30 tablet, Refills: 0    Associated Diagnoses: Alcoholic intoxication without complication (H)      hydrOXYzine (ATARAX) 50 MG tablet Take 1 tablet (50 mg) by mouth nightly as needed for other (sleep)  Qty: 30 tablet, Refills: 0    Associated Diagnoses: Alcoholic intoxication without complication (H)      magnesium oxide (MAG-OX) 400 MG tablet Take 1 tablet (400 mg) by mouth daily  Qty: 30 tablet, Refills: 0    Associated Diagnoses: Alcoholic intoxication without complication (H)      midodrine (PROAMATINE) 2.5 MG tablet Take 3 tablets (7.5 mg) by mouth 3 times daily (with meals)  Qty: 270 tablet, Refills: 0    Associated Diagnoses: Alcoholic cirrhosis of liver with ascites (H)      mirtazapine (REMERON) 30 MG tablet Take 30 mg by mouth At Bedtime Filled 1/11/21 for #30      multivitamin w/minerals (THERA-VIT-M) tablet Take 1 tablet by mouth daily  Qty: 30 tablet, Refills: 0    Associated Diagnoses: Alcoholic intoxication without complication (H)      nicotine (COMMIT) 2 MG lozenge Place 1 lozenge (2 mg) inside cheek every hour as needed for smoking cessation  Qty: 144 lozenge, Refills: 0    Associated Diagnoses: Alcoholic intoxication without complication (H)      nicotine (NICODERM CQ) 21 MG/24HR 24 hr patch Place 1 patch onto the skin daily  Qty: 30 patch, Refills: 0    Associated Diagnoses: Alcoholic intoxication without complication (H)      pantoprazole (PROTONIX) 40 MG EC tablet Take 1 tablet (40 mg) by mouth 2 times daily  Qty: 60 tablet, Refills: 0    Associated Diagnoses: Alcoholic intoxication without complication (H)      !!  QUEtiapine (SEROQUEL) 100 MG tablet Take 100 mg by mouth At Bedtime Filled 1/11/21 #30      !! QUEtiapine (SEROQUEL) 50 MG tablet Take 1 tablet (50 mg) by mouth 3 times daily as needed (anxiety) Filled 11/20/20 #30  Qty: 90 tablet, Refills: 0    Associated Diagnoses: Anxiety and depression      tamsulosin (FLOMAX) 0.4 MG capsule Take 2 capsules (0.8 mg) by mouth daily  Qty: 30 capsule, Refills: 0    Associated Diagnoses: Benign prostatic hyperplasia with urinary retention      traZODone (DESYREL) 100 MG tablet Take 100 mg by mouth At Bedtime Filled 1/11/21 #30      vortioxetine (TRINTELLIX) 10 MG tablet Take 1 tablet (10 mg) by mouth daily  Qty: 30 tablet, Refills: 0    Associated Diagnoses: Severe episode of recurrent major depressive disorder, without psychotic features (H)       !! - Potential duplicate medications found. Please discuss with provider.        Allergies   Allergies   Allergen Reactions     Amlodipine Swelling     Lisinopril      Other reaction(s): Angioedema  Mouth and tongue swelling   Mouth and tongue swelling

## 2021-02-13 NOTE — PLAN OF CARE
DATE & TIME: 2/12/21 6952-5205  Cognitive Concerns/ Orientation : Pt A/Ox4, forgetful.   BEHAVIOR & AGGRESSION TOOL COLOR: Green  CIWA SCORE: 7 (tremor & anxiety)  ABNL VS/O2: VSS on RA.   MOBILITY: SBA with walker and gait belt, fall risk. Bed alarm on. Walked in muniz w/ PT.   PAIN MANAGMENT: Denies.   DIET: 2gm Na, tolerating well.   BOWEL/BLADDER: Incontinent at times. No issue w/ retention this shift.  ABNL LAB/BG: Hgb 7.7, Creat 2.16, Albumin 3.0, Platelets 73   DRAIN/DEVICES: SL  SKIN: Bruising, blotchy skin. Abdomen rounded/taut. Hernia at umbilicus.  TESTS/PROCEDURES:  Paracentesis completed today, removed 3.7 L.   D/C DAY/GOALS/PLACE: Discharge pending progress. Most likely to TCU per OT.  OTHER: PT, OT, CC, & SW following.

## 2021-02-13 NOTE — CONSULTS
Care Management Initial Consult    General Information  Assessment completed with:  Patient and chart review       Primary Care Provider verified and updated as needed:     Readmission within the last 30 days:           Advance Care Planning:            Communication Assessment  Patient's communication style: spoken language (English or Bilingual)    Hearing Difficulty or Deaf: no   Wear Glasses or Blind: no    Cognitive  Cognitive/Neuro/Behavioral: WDL                      Living Environment:   People in home:       Current living Arrangements: house(townhouse)      Able to return to prior arrangements:         Family/Social Support:  Care provided by:    Provides care for:                  Description of Support System:           Current Resources:   Patient receiving home care services:       Community Resources:    Equipment currently used at home: grab bar, tub/shower, walker, rolling  Supplies currently used at home:      Employment/Financial:  Employment Status:          Financial Concerns:             Lifestyle & Psychosocial Needs:        Socioeconomic History     Marital status: Single     Spouse name: Not on file     Number of children: 2     Years of education: Not on file     Highest education level: Not on file   Occupational History     Occupation: Printer, on disability     Tobacco Use     Smoking status: Current Every Day Smoker     Packs/day: 1.00     Types: Cigarettes     Smokeless tobacco: Never Used     Tobacco comment: Chantix caused nightmares   Substance and Sexual Activity     Alcohol use: Yes     Comment: reports he drinks .75 of vodka every 2 days     Drug use: No     Sexual activity: Not Currently       Functional Status:  Prior to admission patient needed assistance:              Mental Health Status:          Chemical Dependency Status: Patient currently on a Full Commitment with provisional discharge to home.  His behavorial  is Omayra Nelson [phone]   .            Values/Beliefs:  Spiritual, Cultural Beliefs, [employer] Practices, Values that affect care:                 Additional Information:  Met with patient today.  His plan is to return home.  He feels he is walking well enough with a walker to be safe at home.   He does usually drives and usually is not homebound but does feel he will be homebound initially upon returning home.   He would like to receive home PT, and writer is suggesting also home RN also to assist him with setting up a medication system.  Patient reports he has been having difficulty remembering  to take his medication.  Patient has documented short term memory deficit.  He does plan to begin using a pill tray at home.   He has not had home care before and would like to use [facility]. Referral sent to home care via email.  Dr Calhoun plans to discharge patient today and per RN he is requesting to discharge home today.   He plans to call his behavioral  on Monday and writer will also call her to update her of Jim's stay.    TANA CejaSW

## 2021-02-13 NOTE — DISCHARGE INSTRUCTIONS
[facility] NUR[facility] AND PHYSICAL THERAPY  THE AGENCY WILL CALL YOU WITHIN 2 DAYS TO SET UP THE FIRST VISIT.  IF YOU DON'T HEAR FROM THEM, YOU CAN CALL THEIR OFFICE -[phone].

## 2021-02-13 NOTE — PLAN OF CARE
DATE & TIME: 2/12/21 5932-4126  Cognitive Concerns/ Orientation : A/Ox4  BEHAVIOR & AGGRESSION TOOL COLOR: Green  CIWA SCORE: 6 (tremor & anxiety), 4, 3  ABNL VS/O2: VSS on RA.   MOBILITY: SBA with walker and gait belt, fall risk. Walked in muniz, steady gait; per pt strength improving.   PAIN MANAGMENT: Denies.   DIET: 2gm Na, tolerating well.   BOWEL/BLADDER: Continent up to bathroom.  ABNL LAB/BG: Hgb 7.7, Creat 2.16, Albumin 3.0, Platelets 73   DRAIN/DEVICES: R arm PIV SL  SKIN: Bruising, blotchy skin. Abdomen distended/taut. Hernia at umbilicus.  TESTS/PROCEDURES:  Paracentesis completed 2/12, removed 3.7 L.   D/C DAY/GOALS/PLACE: Discharge pending progress. Most likely to TCU per OT.  OTHER: PT, OT, CC, & SW following.

## 2021-02-13 NOTE — PLAN OF CARE
DATE & TIME: 2/13/21 1142-3002  Cognitive Concerns/ Orientation : A/Ox4  BEHAVIOR & AGGRESSION TOOL COLOR: Green  CIWA SCORE: 5 & 4 (tremor & anxiety)  ABNL VS/O2: VSS on RA.   MOBILITY: SBA with walker and gait belt, fall risk. Walked in muniz, steady gait; per pt strength improving.   PAIN MANAGMENT: Denies.   DIET: 2gm Na, tolerating well.   BOWEL/BLADDER: Continent up to bathroom.  ABNL LAB/BG: Hgb 7.7, Creat 2.15, Albumin 3.0, Platelets 73   DRAIN/DEVICES: R arm PIV SL  SKIN: Bruising, blotchy skin. Abdomen distended/taut. Hernia at umbilicus.  TESTS/PROCEDURES:  Paracentesis completed 2/12, removed 3.7 L, site CDI.   D/C DAY/GOALS/PLACE: Discharge possibly today, pending PT/OT. Patient declining TCU.   OTHER: PT, OT, CC, & SW following.

## 2021-02-14 LAB
BACTERIA SPEC CULT: NO GROWTH
SPECIMEN SOURCE: NORMAL

## 2021-02-14 NOTE — PLAN OF CARE
Physical Therapy Discharge Summary    Reason for therapy discharge:    Discharged to home with home therapy.    Progress towards therapy goal(s). See goals on Care Plan in [facility] electronic health record for goal details.  Goals partially met.  Barriers to achieving goals:   discharge from facility.    Therapy recommendation(s):    **Patient not seen by discharging therapist on this date, note written based on previous treating therapist's notes and recommendations     Previous therapist evaluation patient recommended TCU due to patient being below baseline and requiring Ax1 for safe navigation of home environment with use of FWW. Per chart - patient choosing to discharge to home with HH-PT.

## 2021-02-14 NOTE — PLAN OF CARE
Occupational Therapy Discharge Summary    Reason for therapy discharge:    Discharged to home with home therapy.    Progress towards therapy goal(s). See goals on Care Plan in [facility] electronic health record for goal details.  Goals not met.  Barriers to achieving goals:   discharge from facility.    Therapy recommendation(s):    Continued therapy is recommended.  Rationale/Recommendations:  Recommend continued OT/Home OT to continue to work on I/ADL retraining in home environment(especially medication mgmt.)/safety evaluation.

## 2021-02-15 LAB — INTERPRETATION ECG - MUSE: NORMAL

## 2021-02-17 LAB
BACTERIA SPEC CULT: NO GROWTH
SPECIMEN SOURCE: NORMAL

## 2021-03-02 ENCOUNTER — HOSPITAL ENCOUNTER (OUTPATIENT)
Facility: CLINIC | Age: 66
End: 2021-03-02
Payer: MEDICARE

## 2021-03-02 DIAGNOSIS — Z11.59 ENCOUNTER FOR SCREENING FOR OTHER VIRAL DISEASES: ICD-10-CM

## 2021-03-05 ENCOUNTER — TELEPHONE (OUTPATIENT)
Dept: MEDSURG UNIT | Facility: CLINIC | Age: 66
End: 2021-03-05

## 2021-03-05 NOTE — TELEPHONE ENCOUNTER
Patient needs COVID test prior to procedure on 3/9.  Was scheduled for 3/4 but was a no-show.  Left message on patient's voicemail that he will need to have COVID test prior to his procedure.

## 2021-03-08 ENCOUNTER — TELEPHONE (OUTPATIENT)
Dept: MEDSURG UNIT | Facility: CLINIC | Age: 66
End: 2021-03-08

## 2021-03-10 ENCOUNTER — HOSPITAL ENCOUNTER (INPATIENT)
Facility: CLINIC | Age: 66
LOS: 5 days | Discharge: HOME HEALTH | DRG: 897 | End: 2021-03-17
Attending: EMERGENCY MEDICINE | Admitting: INTERNAL MEDICINE
Payer: MEDICARE

## 2021-03-10 ENCOUNTER — HOSPITAL ENCOUNTER (EMERGENCY)
Facility: CLINIC | Age: 66
Discharge: HOME | DRG: 897 | End: 2021-03-10
Attending: EMERGENCY MEDICINE | Admitting: EMERGENCY MEDICINE
Payer: MEDICARE

## 2021-03-10 ENCOUNTER — APPOINTMENT (OUTPATIENT)
Dept: GENERAL RADIOLOGY | Facility: CLINIC | Age: 66
DRG: 897 | End: 2021-03-10
Attending: EMERGENCY MEDICINE
Payer: MEDICARE

## 2021-03-10 ENCOUNTER — APPOINTMENT (OUTPATIENT)
Dept: CT IMAGING | Facility: CLINIC | Age: 66
DRG: 897 | End: 2021-03-10
Attending: EMERGENCY MEDICINE
Payer: MEDICARE

## 2021-03-10 ENCOUNTER — APPOINTMENT (OUTPATIENT)
Dept: ULTRASOUND IMAGING | Facility: CLINIC | Age: 66
DRG: 897 | End: 2021-03-10
Attending: EMERGENCY MEDICINE
Payer: MEDICARE

## 2021-03-10 VITALS
WEIGHT: 190 LBS | TEMPERATURE: 98 F | SYSTOLIC BLOOD PRESSURE: 134 MMHG | RESPIRATION RATE: 16 BRPM | BODY MASS INDEX: 29.82 KG/M2 | OXYGEN SATURATION: 97 % | HEIGHT: 67 IN | DIASTOLIC BLOOD PRESSURE: 65 MMHG | HEART RATE: 82 BPM

## 2021-03-10 DIAGNOSIS — K70.31 ALCOHOLIC CIRRHOSIS OF LIVER WITH ASCITES (H): ICD-10-CM

## 2021-03-10 DIAGNOSIS — D61.818 PANCYTOPENIA (H): ICD-10-CM

## 2021-03-10 DIAGNOSIS — S51.011A SKIN TEAR OF RIGHT ELBOW WITHOUT COMPLICATION, INITIAL ENCOUNTER: ICD-10-CM

## 2021-03-10 DIAGNOSIS — R53.1 GENERALIZED WEAKNESS: ICD-10-CM

## 2021-03-10 DIAGNOSIS — N18.32 STAGE 3B CHRONIC KIDNEY DISEASE (H): ICD-10-CM

## 2021-03-10 DIAGNOSIS — F10.220 ALCOHOL DEPENDENCE WITH UNCOMPLICATED INTOXICATION (H): ICD-10-CM

## 2021-03-10 DIAGNOSIS — E87.20 LACTIC ACIDOSIS: ICD-10-CM

## 2021-03-10 DIAGNOSIS — R29.6 FALLS FREQUENTLY: ICD-10-CM

## 2021-03-10 LAB
ALBUMIN FLD-MCNC: 1 G/DL
ALBUMIN SERPL-MCNC: 3.1 G/DL (ref 3.4–5)
ALBUMIN SERPL-MCNC: 3.3 G/DL (ref 3.4–5)
ALP SERPL-CCNC: 260 U/L (ref 40–150)
ALP SERPL-CCNC: 269 U/L (ref 40–150)
ALT SERPL W P-5'-P-CCNC: 38 U/L (ref 0–70)
ALT SERPL W P-5'-P-CCNC: 39 U/L (ref 0–70)
ANION GAP SERPL CALCULATED.3IONS-SCNC: 15 MMOL/L (ref 3–14)
ANION GAP SERPL CALCULATED.3IONS-SCNC: 16 MMOL/L (ref 3–14)
APPEARANCE FLD: CLEAR
AST SERPL W P-5'-P-CCNC: 85 U/L (ref 0–45)
AST SERPL W P-5'-P-CCNC: 89 U/L (ref 0–45)
BASOPHILS # BLD AUTO: 0 10E9/L (ref 0–0.2)
BASOPHILS # BLD AUTO: 0 10E9/L (ref 0–0.2)
BASOPHILS NFR BLD AUTO: 1.6 %
BASOPHILS NFR BLD AUTO: 1.7 %
BILIRUB SERPL-MCNC: 1.4 MG/DL (ref 0.2–1.3)
BILIRUB SERPL-MCNC: 1.8 MG/DL (ref 0.2–1.3)
BUN SERPL-MCNC: 29 MG/DL (ref 7–30)
BUN SERPL-MCNC: 31 MG/DL (ref 7–30)
CALCIUM SERPL-MCNC: 7.9 MG/DL (ref 8.5–10.1)
CALCIUM SERPL-MCNC: 8 MG/DL (ref 8.5–10.1)
CHLORIDE SERPL-SCNC: 101 MMOL/L (ref 94–109)
CHLORIDE SERPL-SCNC: 102 MMOL/L (ref 94–109)
CO2 SERPL-SCNC: 19 MMOL/L (ref 20–32)
CO2 SERPL-SCNC: 19 MMOL/L (ref 20–32)
COLOR FLD: YELLOW
CREAT SERPL-MCNC: 1.73 MG/DL (ref 0.66–1.25)
CREAT SERPL-MCNC: 1.8 MG/DL (ref 0.66–1.25)
DIFFERENTIAL METHOD BLD: ABNORMAL
DIFFERENTIAL METHOD BLD: ABNORMAL
EOSINOPHIL # BLD AUTO: 0 10E9/L (ref 0–0.7)
EOSINOPHIL # BLD AUTO: 0 10E9/L (ref 0–0.7)
EOSINOPHIL NFR BLD AUTO: 0.4 %
EOSINOPHIL NFR BLD AUTO: 0.8 %
ERYTHROCYTE [DISTWIDTH] IN BLOOD BY AUTOMATED COUNT: 20.5 % (ref 10–15)
ERYTHROCYTE [DISTWIDTH] IN BLOOD BY AUTOMATED COUNT: 21.1 % (ref 10–15)
ETHANOL SERPL-MCNC: 0.34 G/DL
GFR SERPL CREATININE-BSD FRML MDRD: 38 ML/MIN/{1.73_M2}
GFR SERPL CREATININE-BSD FRML MDRD: 40 ML/MIN/{1.73_M2}
GLUCOSE SERPL-MCNC: 68 MG/DL (ref 70–99)
GLUCOSE SERPL-MCNC: 71 MG/DL (ref 70–99)
GRAM STN SPEC: NORMAL
HCT VFR BLD AUTO: 24.7 % (ref 40–53)
HCT VFR BLD AUTO: 25.3 % (ref 40–53)
HGB BLD-MCNC: 8.2 G/DL (ref 13.3–17.7)
HGB BLD-MCNC: 8.2 G/DL (ref 13.3–17.7)
IMM GRANULOCYTES # BLD: 0 10E9/L (ref 0–0.4)
IMM GRANULOCYTES # BLD: 0 10E9/L (ref 0–0.4)
IMM GRANULOCYTES NFR BLD: 0.8 %
IMM GRANULOCYTES NFR BLD: 0.8 %
INR PPP: 1.27 (ref 0.86–1.14)
INTERPRETATION ECG - MUSE: NORMAL
INTERPRETATION ECG - MUSE: NORMAL
LABORATORY COMMENT REPORT: NORMAL
LIPASE SERPL-CCNC: 703 U/L (ref 73–393)
LYMPHOCYTES # BLD AUTO: 0.3 10E9/L (ref 0.8–5.3)
LYMPHOCYTES # BLD AUTO: 0.4 10E9/L (ref 0.8–5.3)
LYMPHOCYTES NFR BLD AUTO: 13.2 %
LYMPHOCYTES NFR BLD AUTO: 15.6 %
LYMPHOCYTES NFR FLD MANUAL: 5 %
MAGNESIUM SERPL-MCNC: 1.9 MG/DL (ref 1.6–2.3)
MCH RBC QN AUTO: 30.8 PG (ref 26.5–33)
MCH RBC QN AUTO: 31.7 PG (ref 26.5–33)
MCHC RBC AUTO-ENTMCNC: 32.4 G/DL (ref 31.5–36.5)
MCHC RBC AUTO-ENTMCNC: 33.2 G/DL (ref 31.5–36.5)
MCV RBC AUTO: 95 FL (ref 78–100)
MCV RBC AUTO: 95 FL (ref 78–100)
MONOCYTES # BLD AUTO: 0.4 10E9/L (ref 0–1.3)
MONOCYTES # BLD AUTO: 0.4 10E9/L (ref 0–1.3)
MONOCYTES NFR BLD AUTO: 15.3 %
MONOCYTES NFR BLD AUTO: 15.6 %
MONOS+MACROS NFR FLD MANUAL: 78 %
NEUTROPHILS # BLD AUTO: 1.7 10E9/L (ref 1.6–8.3)
NEUTROPHILS # BLD AUTO: 1.7 10E9/L (ref 1.6–8.3)
NEUTROPHILS NFR BLD AUTO: 65.6 %
NEUTROPHILS NFR BLD AUTO: 68.6 %
NEUTS BAND NFR FLD MANUAL: 3 %
NRBC # BLD AUTO: 0 10*3/UL
NRBC # BLD AUTO: 0 10*3/UL
NRBC BLD AUTO-RTO: 0 /100
NRBC BLD AUTO-RTO: 0 /100
OTHER CELLS FLD MANUAL: 14 %
PLATELET # BLD AUTO: 100 10E9/L (ref 150–450)
PLATELET # BLD AUTO: 98 10E9/L (ref 150–450)
POTASSIUM SERPL-SCNC: 4 MMOL/L (ref 3.4–5.3)
POTASSIUM SERPL-SCNC: 4.2 MMOL/L (ref 3.4–5.3)
PROT FLD-MCNC: 1.8 G/DL
PROT SERPL-MCNC: 6.6 G/DL (ref 6.8–8.8)
PROT SERPL-MCNC: 7.1 G/DL (ref 6.8–8.8)
RBC # BLD AUTO: 2.59 10E12/L (ref 4.4–5.9)
RBC # BLD AUTO: 2.66 10E12/L (ref 4.4–5.9)
SARS-COV-2 RNA RESP QL NAA+PROBE: NEGATIVE
SODIUM SERPL-SCNC: 136 MMOL/L (ref 133–144)
SODIUM SERPL-SCNC: 136 MMOL/L (ref 133–144)
SPECIMEN SOURCE FLD: NORMAL
SPECIMEN SOURCE: NORMAL
SPECIMEN SOURCE: NORMAL
TROPONIN I SERPL-MCNC: 0.01 UG/L (ref 0–0.04)
TROPONIN I SERPL-MCNC: <0.015 UG/L (ref 0–0.04)
WBC # BLD AUTO: 2.4 10E9/L (ref 4–11)
WBC # BLD AUTO: 2.6 10E9/L (ref 4–11)
WBC # FLD AUTO: 171 /UL

## 2021-03-10 PROCEDURE — 83605 ASSAY OF LACTIC ACID: CPT | Performed by: EMERGENCY MEDICINE

## 2021-03-10 PROCEDURE — 87015 SPECIMEN INFECT AGNT CONCNTJ: CPT | Performed by: EMERGENCY MEDICINE

## 2021-03-10 PROCEDURE — 89051 BODY FLUID CELL COUNT: CPT | Performed by: EMERGENCY MEDICINE

## 2021-03-10 PROCEDURE — 82042 OTHER SOURCE ALBUMIN QUAN EA: CPT | Performed by: EMERGENCY MEDICINE

## 2021-03-10 PROCEDURE — 93005 ELECTROCARDIOGRAM TRACING: CPT

## 2021-03-10 PROCEDURE — 82040 ASSAY OF SERUM ALBUMIN: CPT | Performed by: EMERGENCY MEDICINE

## 2021-03-10 PROCEDURE — 49083 ABD PARACENTESIS W/IMAGING: CPT

## 2021-03-10 PROCEDURE — 71046 X-RAY EXAM CHEST 2 VIEWS: CPT

## 2021-03-10 PROCEDURE — 87205 SMEAR GRAM STAIN: CPT | Performed by: EMERGENCY MEDICINE

## 2021-03-10 PROCEDURE — 84484 ASSAY OF TROPONIN QUANT: CPT | Performed by: EMERGENCY MEDICINE

## 2021-03-10 PROCEDURE — 73080 X-RAY EXAM OF ELBOW: CPT | Mod: RT

## 2021-03-10 PROCEDURE — 90715 TDAP VACCINE 7 YRS/> IM: CPT | Performed by: EMERGENCY MEDICINE

## 2021-03-10 PROCEDURE — 87635 SARS-COV-2 COVID-19 AMP PRB: CPT | Performed by: EMERGENCY MEDICINE

## 2021-03-10 PROCEDURE — 93005 ELECTROCARDIOGRAM TRACING: CPT | Mod: 59

## 2021-03-10 PROCEDURE — 84157 ASSAY OF PROTEIN OTHER: CPT | Performed by: EMERGENCY MEDICINE

## 2021-03-10 PROCEDURE — 85025 COMPLETE CBC W/AUTO DIFF WBC: CPT | Performed by: EMERGENCY MEDICINE

## 2021-03-10 PROCEDURE — 80053 COMPREHEN METABOLIC PANEL: CPT | Performed by: EMERGENCY MEDICINE

## 2021-03-10 PROCEDURE — 83735 ASSAY OF MAGNESIUM: CPT | Performed by: EMERGENCY MEDICINE

## 2021-03-10 PROCEDURE — 99285 EMERGENCY DEPT VISIT HI MDM: CPT | Mod: 25

## 2021-03-10 PROCEDURE — 250N000009 HC RX 250: Performed by: RADIOLOGY

## 2021-03-10 PROCEDURE — C9803 HOPD COVID-19 SPEC COLLECT: HCPCS

## 2021-03-10 PROCEDURE — 87070 CULTURE OTHR SPECIMN AEROBIC: CPT | Performed by: EMERGENCY MEDICINE

## 2021-03-10 PROCEDURE — 70450 CT HEAD/BRAIN W/O DYE: CPT | Mod: MG

## 2021-03-10 PROCEDURE — 82077 ASSAY SPEC XCP UR&BREATH IA: CPT | Performed by: EMERGENCY MEDICINE

## 2021-03-10 PROCEDURE — 250N000011 HC RX IP 250 OP 636: Performed by: EMERGENCY MEDICINE

## 2021-03-10 PROCEDURE — 83690 ASSAY OF LIPASE: CPT | Performed by: EMERGENCY MEDICINE

## 2021-03-10 PROCEDURE — 85610 PROTHROMBIN TIME: CPT | Performed by: EMERGENCY MEDICINE

## 2021-03-10 PROCEDURE — 90471 IMMUNIZATION ADMIN: CPT | Performed by: EMERGENCY MEDICINE

## 2021-03-10 RX ORDER — LIDOCAINE 40 MG/G
CREAM TOPICAL
Status: DISCONTINUED | OUTPATIENT
Start: 2021-03-10 | End: 2021-03-10 | Stop reason: HOSPADM

## 2021-03-10 RX ORDER — LIDOCAINE HYDROCHLORIDE 10 MG/ML
10 INJECTION, SOLUTION EPIDURAL; INFILTRATION; INTRACAUDAL; PERINEURAL ONCE
Status: COMPLETED | OUTPATIENT
Start: 2021-03-10 | End: 2021-03-10

## 2021-03-10 RX ADMIN — LIDOCAINE HYDROCHLORIDE 6 ML: 10 INJECTION, SOLUTION EPIDURAL; INFILTRATION; INTRACAUDAL; PERINEURAL at 11:25

## 2021-03-10 RX ADMIN — CLOSTRIDIUM TETANI TOXOID ANTIGEN (FORMALDEHYDE INACTIVATED), CORYNEBACTERIUM DIPHTHERIAE TOXOID ANTIGEN (FORMALDEHYDE INACTIVATED), BORDETELLA PERTUSSIS TOXOID ANTIGEN (GLUTARALDEHYDE INACTIVATED), BORDETELLA PERTUSSIS FILAMENTOUS HEMAGGLUTININ ANTIGEN (FORMALDEHYDE INACTIVATED), BORDETELLA PERTUSSIS PERTACTIN ANTIGEN, AND BORDETELLA PERTUSSIS FIMBRIAE 2/3 ANTIGEN 1 ML: 5; 2; 2.5; 5; 3; 5 INJECTION, SUSPENSION INTRAMUSCULAR at 23:22

## 2021-03-10 ASSESSMENT — MIFFLIN-ST. JEOR: SCORE: 1595.46

## 2021-03-10 ASSESSMENT — ENCOUNTER SYMPTOMS
ABDOMINAL PAIN: 1
WEAKNESS: 1
ABDOMINAL DISTENTION: 1
ABDOMINAL PAIN: 0
VOMITING: 0
WEAKNESS: 0
BACK PAIN: 1
VOMITING: 0
NAUSEA: 0
NUMBNESS: 0
HEADACHES: 0
NAUSEA: 0

## 2021-03-10 NOTE — ED PROVIDER NOTES
History   Chief Complaint:  Fall, Arm Pain, Abdominal Pain    HPI   Jim Richard is a 66 year old male, s/p left fem-pop bypass d/t PAD with a history of alcoholic cirrhosis with ascites (with paracentesis last paracentesis scheduled for 3/4 but was no-show), PVD, alcohol abuse, esophogeal varices, hypertension, hyperlipidemia, CAD, and chronic back pain, who presents to the ED for evaluation after a fall with arm pain and abdominal pain. EMS reports the patient fell yesterday at 1800. They state the patient did not strike his head nor id he lose consciousness. Paramedics states he has some right arm pain, but his main complaint is his abdominal pain and distension, for which he knows he needs a paracentesis for. They convey he was drinking last night which is why the patient thinks he fell. En route, the patient's blood glucose was 77 and was comfortable to the point where he fell asleep. Here in the emergency department, the patient says he has chronic back pain, which is not worse. He does note the right arm pain, but mostly is complaining of abdominal distension and pain. He has no had any weakness or numbness. He denies any nausea or vomiting.    Review of Systems   Gastrointestinal: Positive for abdominal distention and abdominal pain. Negative for nausea and vomiting.   Musculoskeletal: Positive for back pain (Chronic).        Right arm pain   Neurological: Negative for syncope, weakness and numbness.   10 point review of systems performed and is negative except as above and in HPI.    Allergies:  Amlodipine  Lisinopril    Medications:    Atenolol  Hydroxyzine  Mirtazapine  Protonix  Seroquel  Flomax  Desyrel  Trintellix    Past Medical History:    Alcohol abuse  Anxiety  CAD  Depression  Esophageal varices with bleeding  Hepatomegaly  Hyperlipidemia  Hypertension  JANIS  PVD  Subdural hematoma  Spinal stenosis  Substance abuse  Alcoholic cirrhosis with ascites  PAD  Alcoholic  "ketoacidosis  Colitis  Intentional overdose  Chronic back pain    Past Surgical History:    Appendectomy  Femoropopliteal, left, bypass graft  Laminectomy  Tonsillectomy  Adenoidectomy  Femoral endarterectomy    Family History:    CAD  Substance abuse  Lung disease  Mental illness    Social History:  Alcohol use: Yes  PCP: FERNANDA BRANTLEY  Presents to the ED via EMS    Physical Exam     Patient Vitals for the past 24 hrs:   BP Temp Pulse Resp SpO2 Height Weight   03/10/21 1400 135/63 -- 82 -- 97 % -- --   03/10/21 1330 130/59 -- 79 -- 97 % -- --   03/10/21 1300 -- -- -- -- 96 % -- --   03/10/21 1100 129/61 -- 79 -- 95 % -- --   03/10/21 1030 127/60 -- 82 16 97 % -- --   03/10/21 1000 (!) 141/70 -- 77 -- 96 % -- --   03/10/21 0951 (!) 150/81 98  F (36.7  C) 75 18 98 % 1.702 m (5' 7\") 86.2 kg (190 lb)       Physical Exam  General: Resting on the gurney, appears uncomfortable  Head:  The scalp, face, and head appear normal. No evidence of injury. Neck has no midline tenderness to palpation.   Mouth/Throat: Mucus membranes are moist  CV:  Regular rate    Normal S1 and S2  No pathological murmur   Resp:  Breath sounds clear and equal bilaterally    Non-labored, no retractions or accessory muscle use    No coarseness    No wheezing   GI:  Profoundly distended with fullness, but not pain to the touch.   MS:  Normal motor assessment of all extremities. Right arm has several contusions.    Good capillary refill noted.  Skin:  No rash or lesions noted.  Neuro:   Speech is normal and fluent. No apparent deficit.  Psych: Awake. Alert.  Normal affect.      Appropriate interactions.    Emergency Department Course   ECG (12:52:01):  Rate 78 bpm. LA interval 150. QRS duration 140. QT/QTc 444/506. P-R-T axes 62 -19 67. Normal sinus rhythm. Right bundle branch block. Abnormal ECG. Interpreted by Nae Sheridan MD.    Imaging:  CT Head without contrast:  Diffuse cerebral volume loss and cerebral white matter   changes " consistent with chronic small vessel ischemic disease. No   evidence for acute intracranial pathology.     US Paracentesis  Pending    Imaging independently reviewed and agree with radiologist interpretation.     Laboratory:  CBC: WBC 2.4 (L), HGB 8.2 (L),  (L)    CMP: CO2 19 (L), Anion Gap 15 (H), GLC 68 (L), BUN 31 (H), Creatinine 1.80 (H), GFR 38 (L), Ca 8.0 (L), Bilirubin Total 1.4 (H), Albumin 3.3 (L), ALKPHOS 260 (H), AST 85 (H), o/w WNL    INR: 1.27    Troponin (Collected 0956): <0.015    Protein fluid: 1.8  Albumin fluid: 1.0  Fluid culture aerobic bacterial: pending  Gram stain: Negative  Cell count differential: , o/w WNL    COVID-19 Virus PCR: Negative    Emergency Department Course:    Reviewed:  I reviewed the patient's nursing notes, vitals, past available medical records.     Assessments:  0947: I obtained history and examined the patient as noted above.     1300 I rechecked the patient and explained findings. The patient feels that he is took weak to leave and does not want to attempt road test.     1419: I rechecked the patient. Patient able to be discharged home.    Disposition:  The patient was discharged to home.    Impression & Plan    Medical Decision Making:  Jim Richard is a 66 year old male who presents requesting paracentesis.  He has had multiple falls related to his alcohol abuse.  He had slight bruising of the right arm but no focal bony tenderness.  Given his alcohol abuse a head CT was obtained which was unremarkable.  Lab evaluation was unremarkable for acute abnormality as well.  The patient has alcoholism and liver failure and has occasionally needed paracenteses.  Today his abdomen is quite distended and taut and is tender secondary to fullness, but is not otherwise painful.  Paracentesis performed without complication.  Patient does still feel slightly weak but was able to ambulate with a walker in the emergency department.  He is discharged and is strongly  encouraged to attempt to quit drinking and follow closely with a primary care doctor.        Covid-19  Jim Richard was evaluated during a global COVID-19 pandemic, which necessitated consideration that the patient might be at risk for infection with the SARS-CoV-2 virus that causes COVID-19.   Applicable protocols for evaluation were followed during the patient's care.   COVID-19 was considered as part of the patient's evaluation. The plan for testing is:  a test was obtained during this visit.    Diagnosis:    ICD-10-CM    1. Alcoholic cirrhosis of liver with ascites (H)  K70.31 Cell count with differential fluid     Scribe Disclosure:  Roverto VALENTE, am serving as a scribe at 9:47 AM on 3/10/2021 to document services personally performed by Nae Sheridan MD based on my observations and the provider's statements to me.      Nae Sheridan MD  03/10/21 9056

## 2021-03-10 NOTE — ED NOTES
Bed: ED21  Expected date:   Expected time:   Means of arrival:   Comments:  [facility] [place] - 438 - 66 M fall arm pain no covid eta 0972

## 2021-03-11 ENCOUNTER — APPOINTMENT (OUTPATIENT)
Dept: PHYSICAL THERAPY | Facility: CLINIC | Age: 66
DRG: 897 | End: 2021-03-11
Attending: INTERNAL MEDICINE
Payer: MEDICARE

## 2021-03-11 PROBLEM — R53.1 GENERALIZED WEAKNESS: Status: ACTIVE | Noted: 2021-03-11

## 2021-03-11 LAB
ALBUMIN UR-MCNC: 70 MG/DL
APPEARANCE UR: CLEAR
BILIRUB UR QL STRIP: NEGATIVE
COLOR UR AUTO: YELLOW
GLUCOSE UR STRIP-MCNC: NEGATIVE MG/DL
HGB UR QL STRIP: NEGATIVE
KETONES UR STRIP-MCNC: 20 MG/DL
LACTATE BLD-SCNC: 2.7 MMOL/L (ref 0.7–2)
LEUKOCYTE ESTERASE UR QL STRIP: NEGATIVE
MUCOUS THREADS #/AREA URNS LPF: PRESENT /LPF
NITRATE UR QL: NEGATIVE
PH UR STRIP: 6 PH (ref 5–7)
RBC #/AREA URNS AUTO: 1 /HPF (ref 0–2)
SOURCE: ABNORMAL
SP GR UR STRIP: 1.02 (ref 1–1.03)
SQUAMOUS #/AREA URNS AUTO: 1 /HPF (ref 0–1)
UROBILINOGEN UR STRIP-MCNC: 2 MG/DL (ref 0–2)
WBC #/AREA URNS AUTO: 1 /HPF (ref 0–5)

## 2021-03-11 PROCEDURE — 250N000013 HC RX MED GY IP 250 OP 250 PS 637: Performed by: INTERNAL MEDICINE

## 2021-03-11 PROCEDURE — 96366 THER/PROPH/DIAG IV INF ADDON: CPT

## 2021-03-11 PROCEDURE — 250N000011 HC RX IP 250 OP 636: Performed by: EMERGENCY MEDICINE

## 2021-03-11 PROCEDURE — 250N000009 HC RX 250: Performed by: HOSPITALIST

## 2021-03-11 PROCEDURE — 258N000003 HC RX IP 258 OP 636: Performed by: EMERGENCY MEDICINE

## 2021-03-11 PROCEDURE — 97116 GAIT TRAINING THERAPY: CPT | Mod: GP | Performed by: PHYSICAL THERAPIST

## 2021-03-11 PROCEDURE — 99207 PR CDG-CODE CATEGORY CHANGED: CPT | Performed by: INTERNAL MEDICINE

## 2021-03-11 PROCEDURE — 81001 URINALYSIS AUTO W/SCOPE: CPT | Performed by: EMERGENCY MEDICINE

## 2021-03-11 PROCEDURE — 97530 THERAPEUTIC ACTIVITIES: CPT | Mod: GP | Performed by: PHYSICAL THERAPIST

## 2021-03-11 PROCEDURE — G0378 HOSPITAL OBSERVATION PER HR: HCPCS

## 2021-03-11 PROCEDURE — 250N000009 HC RX 250: Performed by: EMERGENCY MEDICINE

## 2021-03-11 PROCEDURE — 250N000013 HC RX MED GY IP 250 OP 250 PS 637: Performed by: HOSPITALIST

## 2021-03-11 PROCEDURE — 99220 PR INITIAL OBSERVATION CARE,LEVEL III: CPT | Performed by: INTERNAL MEDICINE

## 2021-03-11 PROCEDURE — 97161 PT EVAL LOW COMPLEX 20 MIN: CPT | Mod: GP | Performed by: PHYSICAL THERAPIST

## 2021-03-11 PROCEDURE — 96365 THER/PROPH/DIAG IV INF INIT: CPT

## 2021-03-11 PROCEDURE — 250N000011 HC RX IP 250 OP 636: Performed by: INTERNAL MEDICINE

## 2021-03-11 RX ORDER — FOLIC ACID 1 MG/1
1 TABLET ORAL DAILY
Status: ON HOLD | COMMUNITY
End: 2021-07-13

## 2021-03-11 RX ORDER — LORAZEPAM 1 MG/1
1-2 TABLET ORAL EVERY 30 MIN PRN
Status: DISCONTINUED | OUTPATIENT
Start: 2021-03-11 | End: 2021-03-17 | Stop reason: HOSPADM

## 2021-03-11 RX ORDER — MIRTAZAPINE 15 MG/1
30 TABLET, FILM COATED ORAL AT BEDTIME
Status: DISCONTINUED | OUTPATIENT
Start: 2021-03-11 | End: 2021-03-17 | Stop reason: HOSPADM

## 2021-03-11 RX ORDER — HYDROXYZINE HYDROCHLORIDE 25 MG/1
50 TABLET, FILM COATED ORAL
Status: DISCONTINUED | OUTPATIENT
Start: 2021-03-11 | End: 2021-03-17 | Stop reason: HOSPADM

## 2021-03-11 RX ORDER — AMOXICILLIN 250 MG
1 CAPSULE ORAL 2 TIMES DAILY PRN
Status: DISCONTINUED | OUTPATIENT
Start: 2021-03-11 | End: 2021-03-17 | Stop reason: HOSPADM

## 2021-03-11 RX ORDER — LANOLIN ALCOHOL/MO/W.PET/CERES
100 CREAM (GRAM) TOPICAL DAILY
Status: DISCONTINUED | OUTPATIENT
Start: 2021-03-18 | End: 2021-03-17 | Stop reason: HOSPADM

## 2021-03-11 RX ORDER — LIDOCAINE HYDROCHLORIDE 20 MG/ML
JELLY TOPICAL ONCE
Status: COMPLETED | OUTPATIENT
Start: 2021-03-11 | End: 2021-03-11

## 2021-03-11 RX ORDER — GABAPENTIN 600 MG/1
1200 TABLET ORAL ONCE
Status: COMPLETED | OUTPATIENT
Start: 2021-03-11 | End: 2021-03-11

## 2021-03-11 RX ORDER — LIDOCAINE HYDROCHLORIDE 20 MG/ML
JELLY TOPICAL ONCE
Status: COMPLETED | OUTPATIENT
Start: 2021-03-11 | End: 2021-03-12

## 2021-03-11 RX ORDER — QUETIAPINE FUMARATE 25 MG/1
50 TABLET, FILM COATED ORAL AT BEDTIME
Status: DISCONTINUED | OUTPATIENT
Start: 2021-03-11 | End: 2021-03-17 | Stop reason: HOSPADM

## 2021-03-11 RX ORDER — AMOXICILLIN 250 MG
1 CAPSULE ORAL 2 TIMES DAILY
Status: DISCONTINUED | OUTPATIENT
Start: 2021-03-11 | End: 2021-03-17 | Stop reason: HOSPADM

## 2021-03-11 RX ORDER — AMOXICILLIN 250 MG
2 CAPSULE ORAL 2 TIMES DAILY PRN
Status: DISCONTINUED | OUTPATIENT
Start: 2021-03-11 | End: 2021-03-17 | Stop reason: HOSPADM

## 2021-03-11 RX ORDER — GABAPENTIN 300 MG/1
900 CAPSULE ORAL EVERY 8 HOURS
Status: DISCONTINUED | OUTPATIENT
Start: 2021-03-11 | End: 2021-03-11

## 2021-03-11 RX ORDER — CLONIDINE HYDROCHLORIDE 0.1 MG/1
0.1 TABLET ORAL EVERY 8 HOURS
Status: DISCONTINUED | OUTPATIENT
Start: 2021-03-11 | End: 2021-03-11

## 2021-03-11 RX ORDER — MULTIPLE VITAMINS W/ MINERALS TAB 9MG-400MCG
1 TAB ORAL DAILY
Status: DISCONTINUED | OUTPATIENT
Start: 2021-03-11 | End: 2021-03-17 | Stop reason: HOSPADM

## 2021-03-11 RX ORDER — ACETAMINOPHEN 325 MG/1
650 TABLET ORAL EVERY 4 HOURS PRN
Status: DISCONTINUED | OUTPATIENT
Start: 2021-03-11 | End: 2021-03-17 | Stop reason: HOSPADM

## 2021-03-11 RX ORDER — HALOPERIDOL 5 MG/ML
2.5-5 INJECTION INTRAMUSCULAR EVERY 6 HOURS PRN
Status: DISCONTINUED | OUTPATIENT
Start: 2021-03-11 | End: 2021-03-17 | Stop reason: HOSPADM

## 2021-03-11 RX ORDER — MULTIPLE VITAMINS W/ MINERALS TAB 9MG-400MCG
1 TAB ORAL DAILY
Status: DISCONTINUED | OUTPATIENT
Start: 2021-03-11 | End: 2021-03-11

## 2021-03-11 RX ORDER — FOLIC ACID 1 MG/1
1 TABLET ORAL DAILY
Status: DISCONTINUED | OUTPATIENT
Start: 2021-03-11 | End: 2021-03-11

## 2021-03-11 RX ORDER — GABAPENTIN 300 MG/1
300 CAPSULE ORAL EVERY 8 HOURS
Status: DISCONTINUED | OUTPATIENT
Start: 2021-03-16 | End: 2021-03-11

## 2021-03-11 RX ORDER — LANOLIN ALCOHOL/MO/W.PET/CERES
200 CREAM (GRAM) TOPICAL 3 TIMES DAILY
Status: COMPLETED | OUTPATIENT
Start: 2021-03-11 | End: 2021-03-12

## 2021-03-11 RX ORDER — OLANZAPINE 5 MG/1
5-10 TABLET, ORALLY DISINTEGRATING ORAL EVERY 6 HOURS PRN
Status: DISCONTINUED | OUTPATIENT
Start: 2021-03-11 | End: 2021-03-17 | Stop reason: HOSPADM

## 2021-03-11 RX ORDER — MAGNESIUM OXIDE 400 MG/1
400 TABLET ORAL DAILY
Status: DISCONTINUED | OUTPATIENT
Start: 2021-03-11 | End: 2021-03-17 | Stop reason: HOSPADM

## 2021-03-11 RX ORDER — NICOTINE 21 MG/24HR
1 PATCH, TRANSDERMAL 24 HOURS TRANSDERMAL DAILY
Status: DISCONTINUED | OUTPATIENT
Start: 2021-03-11 | End: 2021-03-17 | Stop reason: HOSPADM

## 2021-03-11 RX ORDER — GABAPENTIN 100 MG/1
100 CAPSULE ORAL EVERY 8 HOURS
Status: DISCONTINUED | OUTPATIENT
Start: 2021-03-18 | End: 2021-03-11

## 2021-03-11 RX ORDER — FOLIC ACID 1 MG/1
1 TABLET ORAL DAILY
Status: DISCONTINUED | OUTPATIENT
Start: 2021-03-11 | End: 2021-03-17 | Stop reason: HOSPADM

## 2021-03-11 RX ORDER — FLUMAZENIL 0.1 MG/ML
0.2 INJECTION, SOLUTION INTRAVENOUS
Status: DISCONTINUED | OUTPATIENT
Start: 2021-03-11 | End: 2021-03-17 | Stop reason: HOSPADM

## 2021-03-11 RX ORDER — ONDANSETRON 4 MG/1
4 TABLET, ORALLY DISINTEGRATING ORAL EVERY 6 HOURS PRN
Status: DISCONTINUED | OUTPATIENT
Start: 2021-03-11 | End: 2021-03-17 | Stop reason: HOSPADM

## 2021-03-11 RX ORDER — POLYETHYLENE GLYCOL 3350 17 G
2 POWDER IN PACKET (EA) ORAL
Status: DISCONTINUED | OUTPATIENT
Start: 2021-03-11 | End: 2021-03-17 | Stop reason: HOSPADM

## 2021-03-11 RX ORDER — PANTOPRAZOLE SODIUM 40 MG/1
40 TABLET, DELAYED RELEASE ORAL 2 TIMES DAILY
Status: DISCONTINUED | OUTPATIENT
Start: 2021-03-11 | End: 2021-03-17 | Stop reason: HOSPADM

## 2021-03-11 RX ORDER — TAMSULOSIN HYDROCHLORIDE 0.4 MG/1
0.8 CAPSULE ORAL DAILY
Status: DISCONTINUED | OUTPATIENT
Start: 2021-03-11 | End: 2021-03-17 | Stop reason: HOSPADM

## 2021-03-11 RX ORDER — LORAZEPAM 2 MG/ML
1-2 INJECTION INTRAMUSCULAR EVERY 30 MIN PRN
Status: DISCONTINUED | OUTPATIENT
Start: 2021-03-11 | End: 2021-03-17 | Stop reason: HOSPADM

## 2021-03-11 RX ORDER — LANOLIN ALCOHOL/MO/W.PET/CERES
400 CREAM (GRAM) TOPICAL DAILY
Status: ON HOLD | COMMUNITY
End: 2021-07-13

## 2021-03-11 RX ORDER — ONDANSETRON 2 MG/ML
4 INJECTION INTRAMUSCULAR; INTRAVENOUS EVERY 6 HOURS PRN
Status: DISCONTINUED | OUTPATIENT
Start: 2021-03-11 | End: 2021-03-17 | Stop reason: HOSPADM

## 2021-03-11 RX ORDER — GABAPENTIN 300 MG/1
600 CAPSULE ORAL EVERY 8 HOURS
Status: DISCONTINUED | OUTPATIENT
Start: 2021-03-14 | End: 2021-03-11

## 2021-03-11 RX ORDER — LANOLIN ALCOHOL/MO/W.PET/CERES
100 CREAM (GRAM) TOPICAL 3 TIMES DAILY
Status: DISCONTINUED | OUTPATIENT
Start: 2021-03-13 | End: 2021-03-17 | Stop reason: HOSPADM

## 2021-03-11 RX ORDER — ACETAMINOPHEN 650 MG/1
650 SUPPOSITORY RECTAL EVERY 4 HOURS PRN
Status: DISCONTINUED | OUTPATIENT
Start: 2021-03-11 | End: 2021-03-17 | Stop reason: HOSPADM

## 2021-03-11 RX ORDER — QUETIAPINE FUMARATE 25 MG/1
50 TABLET, FILM COATED ORAL 3 TIMES DAILY PRN
Status: DISCONTINUED | OUTPATIENT
Start: 2021-03-11 | End: 2021-03-17 | Stop reason: HOSPADM

## 2021-03-11 RX ADMIN — CLONIDINE HYDROCHLORIDE 0.1 MG: 0.1 TABLET ORAL at 02:43

## 2021-03-11 RX ADMIN — LIDOCAINE HYDROCHLORIDE: 20 JELLY TOPICAL at 11:07

## 2021-03-11 RX ADMIN — LORAZEPAM 1 MG: 1 TABLET ORAL at 20:16

## 2021-03-11 RX ADMIN — ACETAMINOPHEN 650 MG: 325 TABLET, FILM COATED ORAL at 13:41

## 2021-03-11 RX ADMIN — THIAMINE HCL TAB 100 MG 200 MG: 100 TAB at 21:47

## 2021-03-11 RX ADMIN — ACETAMINOPHEN 650 MG: 325 TABLET, FILM COATED ORAL at 23:58

## 2021-03-11 RX ADMIN — LORAZEPAM 1 MG: 1 TABLET ORAL at 13:33

## 2021-03-11 RX ADMIN — LORAZEPAM 1 MG: 1 TABLET ORAL at 15:28

## 2021-03-11 RX ADMIN — ACETAMINOPHEN 650 MG: 325 TABLET, FILM COATED ORAL at 04:14

## 2021-03-11 RX ADMIN — FOLIC ACID 1 MG: 1 TABLET ORAL at 09:17

## 2021-03-11 RX ADMIN — VORTIOXETINE 10 MG: 10 TABLET, FILM COATED ORAL at 10:58

## 2021-03-11 RX ADMIN — ONDANSETRON 4 MG: 4 TABLET, ORALLY DISINTEGRATING ORAL at 13:25

## 2021-03-11 RX ADMIN — GABAPENTIN 1200 MG: 600 TABLET, FILM COATED ORAL at 02:43

## 2021-03-11 RX ADMIN — PANTOPRAZOLE SODIUM 40 MG: 40 TABLET, DELAYED RELEASE ORAL at 09:17

## 2021-03-11 RX ADMIN — QUETIAPINE 50 MG: 25 TABLET ORAL at 11:06

## 2021-03-11 RX ADMIN — ACETAMINOPHEN 650 MG: 325 TABLET, FILM COATED ORAL at 18:47

## 2021-03-11 RX ADMIN — SENNOSIDES AND DOCUSATE SODIUM 1 TABLET: 8.6; 5 TABLET ORAL at 10:57

## 2021-03-11 RX ADMIN — THIAMINE HCL TAB 100 MG 200 MG: 100 TAB at 09:17

## 2021-03-11 RX ADMIN — NICOTINE 1 PATCH: 21 PATCH, EXTENDED RELEASE TRANSDERMAL at 09:18

## 2021-03-11 RX ADMIN — SENNOSIDES AND DOCUSATE SODIUM 1 TABLET: 8.6; 5 TABLET ORAL at 20:16

## 2021-03-11 RX ADMIN — PANTOPRAZOLE SODIUM 40 MG: 40 TABLET, DELAYED RELEASE ORAL at 20:16

## 2021-03-11 RX ADMIN — Medication 1 MG: at 04:14

## 2021-03-11 RX ADMIN — QUETIAPINE 50 MG: 25 TABLET ORAL at 21:47

## 2021-03-11 RX ADMIN — TAMSULOSIN HYDROCHLORIDE 0.8 MG: 0.4 CAPSULE ORAL at 09:17

## 2021-03-11 RX ADMIN — MULTIPLE VITAMINS W/ MINERALS TAB 1 TABLET: TAB at 09:17

## 2021-03-11 RX ADMIN — Medication 400 MG: at 09:17

## 2021-03-11 RX ADMIN — FOLIC ACID 1000 ML/HR: 5 INJECTION, SOLUTION INTRAMUSCULAR; INTRAVENOUS; SUBCUTANEOUS at 00:20

## 2021-03-11 RX ADMIN — MIRTAZAPINE 30 MG: 15 TABLET, FILM COATED ORAL at 21:47

## 2021-03-11 RX ADMIN — THIAMINE HCL TAB 100 MG 200 MG: 100 TAB at 15:27

## 2021-03-11 RX ADMIN — LIDOCAINE HYDROCHLORIDE: 20 JELLY TOPICAL at 18:48

## 2021-03-11 RX ADMIN — ACETAMINOPHEN 650 MG: 325 TABLET, FILM COATED ORAL at 09:17

## 2021-03-11 ASSESSMENT — ENCOUNTER SYMPTOMS
ABDOMINAL DISTENTION: 1
WOUND: 1

## 2021-03-11 NOTE — PLAN OF CARE
Cognitive Concerns/ Orientation : A&O x4, calm/cooperative.   BEHAVIOR & AGGRESSION TOOL COLOR: green   CIWA SCORE: 1,  2  ABNL VS/O2: VSS on RA  MOBILITY: Assist of 1, GB   PAIN MANAGMENT: c/o L rib cage pain, managed with hot packs and Tylenol.   DIET: Regular diet   BOWEL/BLADDER: Continent, hesitancy and straining to void; hx of urinary retention, order to straight cath if no void per shift, able to void 100 mls; UA sent.   ABNL LAB/BG: Blood alcohol 0.34; Creat 1.73; Lipase 703; Lactic acid 2.7  DRAIN/DEVICES: PIV, saline locked   TELEMETRY RHYTHM: n/a  SKIN: scattered bruises; large bruises on bilateral forearms; paracentesis site on R lower abd, bandage CDI.   TESTS/PROCEDURES: n/a  D/C DAY/GOALS/PLACE: pending, 1-2 days   OTHER IMPORTANT INFO: LS diminished in bases, SOB. Abd rounded, distended, firm. PT consulted.

## 2021-03-11 NOTE — ED PROVIDER NOTES
History   Chief Complaint:  Alcohol Intoxication     The history is provided by the patient. History limited by: intoxication.      Jim Richard is a 66 year old male with history of alcoholism with cirrhosis and HTN who presents via EMS for weakness.  Jim was seen in this ER and discharged about 12 hours ago after a visit for falls. He had a workup that was reassuring (baseline abnormalitiies) and paracentesis of 5.6L prior to discharge.    Jim tells me he had 2 alcoholic beverages since returning home. He then had a second fall of the day. He was unable to get off the floor and called paramedics for help. He attributes his fall to losing his balance and denies head injury or loss of consciousness.  He did hit his right arm in the fall and has new bruising and right elbow pain.  He is also concerned he may have injured his ribs.  He denies any abdominal pain, nausea, or vomiting.  His tetanus was updated in 2013.    When asked about rehab, Jim tells me he has a  who is helping him look into this already.    Review of Systems   Cardiovascular: Positive for chest pain (chest wall).   Gastrointestinal: Positive for abdominal distention (chronic). Negative for abdominal pain, nausea and vomiting.   Skin: Positive for wound (skin tear).   Neurological: Positive for weakness. Negative for syncope and headaches.   All other systems reviewed and are negative.    Allergies:  Amlodipine  Lisinopril    Medications:  Atenolol  Hydroxyzine   Mirtazapine   Seroquel  Trazodone  Trintellix  Pantoprazole   Tamsulosin   Breo Ellipta  Magnesium oxide   Folic acid     Past Medical History:    Alcoholism  Alcoholic liver disease  Anxiety  Depression   Coronary artery disease   Hypertension  Hyperlipidemia   Peripheral vascular disease  Subdural hematoma   Esophageal varices  Thrombocytopenia   Obstructive sleep apnea    Spinal stenosis   Chronic obstructive pulmonary disease     Past Surgical History:     Appendectomy  Femoropopliteal bypass graft, left   Femoral endarterectomy   Hernia repair  Lumbar fusion  Tonsillectomy with adenoidectomy      Family History:    Coronary artery disease - mother  Substance abuse - father, brother  Lung cancer - father     Social History:  Presents to the ED alone  Alcohol Use: Positive, daily  PCP: FERNANDA BRANTLEY     Physical Exam     Patient Vitals for the past 24 hrs:   BP Temp Temp src Pulse Resp SpO2   03/11/21 0225 126/48 98.1  F (36.7  C) Oral 77 18 95 %   03/10/21 2241 127/64 97.8  F (36.6  C) Temporal 77 16 94 %       Physical Exam  General: Well-developed and well-nourished. Chronically ill appearing elderly  man, appears older than stated age. Cooperative.  Head:  Atraumatic.  Eyes:  Conjunctivae, lids, and sclerae are normal.  ENT:    Normal nose. Moist mucous membranes.  Neck:  Supple. Normal range of motion.  No midline tenderness.  CV:  Regular rate and rhythm. Normal heart sounds with no murmurs, rubs, or gallops detected.  Resp:  No respiratory distress. Clear to auscultation bilaterally without decreased breath sounds, wheezing, rales, or rhonchi.  GI:  Soft.  Mildly distended with adhesive bandage on the right lower abdomen clean, dry, and intact. Non-tender.  MS:  Normal ROM. No bilateral lower extremity edema.  No bony tenderness throughout the right upper extremity with unrestricted range of motion of the left elbow.  Skin:  Warm. Non-diaphoretic. No pallor.  Scattered ecchymosis.  New appearing 1 cm skin tear to the lateral right elbow, hemostatic.  Neuro: Awake. A&Ox3. Normal strength.  Psych:  Normal mood and affect. Normal speech.  Vitals reviewed.    Emergency Department Course   EKG  Indication: weakness  Time: 2319  Rate 72 bpm. OH interval 146. QRS duration 128. QT/QTc 458/501.   Normal sinus rhythm. Left axis deviation. Right bundle branch block. Abnormal ECG.   No acute ST changes.  No significant change as compared to prior, dated  3/10/21.     Imaging:  Elbow x-ray, right (3 views):  Degenerative spur at the lateral humeral condyle likely along the radial collateral ligament. Small olecranon enthesophyte. No visualized joint effusion or displaced fracture. No dislocation.  Report per radiology.     Chest X-ray (PA and lateral):  Heart size within normal limits for portable technique. Pulmonary vascularity normal. The lungs are clear. There are several old bilateral rib fractures. No definite findings for acute fractures.  Report per radiology.     Laboratory:   CBC: WBC 2.6 (L), HGB 8.2 (L), PLT 98 (L)  CMP: CO2 19 (L), Anion Gap 16 (H), creatinine 1.73 (H), GFR 40 (L), Ca 7.9 (L), Total Bilirubin 1.8 (H), Albumin 3.1 (L), Total Protein 6.6 (L), Alk Phos 269 (H), AST 89 (H), otherwise WNL   Magnesium: 1.9  Lipase: 703 (H)   Troponin I: 0.015   Blood alcohol level: 0.34 (HH)  Lactic Acid: 2.7 (H)      Emergency Department Course:  Reviewed:  I reviewed nursing notes, vitals, and past medical history.    Assessments:  (6330) I obtained history and examined Jim as noted above. We discussed plan including that for admission.    Consults:  (3201) I discussed Jim with Dr. Gomez of the hospitalist service, who will place him in observation for further monitoring, evaluation, and treatment.       Interventions:  (2147) Tdap, 0.5 mL, IM   (0020) Dextrose 5% and Normal Saline with multivitamin 10 mL, thiamine 100 mg, and folic acid 1 mg, 1 liter, IV bolus     Disposition:  Jim Richard was admitted to the hospital under the care of Dr. Gomez.     Impression & Plan     CMS Diagnoses: The Lactic acid level is elevated due to alcohol intoxication and liver disease, at this time there is no sign of severe sepsis or septic shock. and None    Medical Decision Making:  Jim is a 66-year-old man with alcohol dependence and subsequent alcoholic liver disease with cirrhosis who was actually seen in this emergency department earlier today after  a fall.  He had extensive work-up and paracentesis which were overall reassuring and he was discharged.  He tells me since that discharge he had a second fall sustaining a skin tear to his right elbow but not sustaining head injury or loss of consciousness.  Unfortunately, he was so weak he could not get up independently prompting call to EMS and repeat ER visit.  He did drink alcohol between ER visits.  He appears chronically ill on exam.  He has ecchymosis of various ages scattered on his extremities with a new appearing skin tear on the right elbow.  There is no focal bony tenderness here and x-ray does not reveal acute fracture or malalignment.  His tetanus was updated.  He complained of rib pain and was concerned for fractures although x-ray today does not reveal any recent bony injury.      EKG is reassuring without acute ST changes or arrhythmias and troponin is negative.  Overall, Jim's laboratory studies appear to be similar to his prior visits.  There is elevation in his lipase, liver enzymes and total bilirubin, chronic pancytopenia, and kidney disease.  Bicarb is low at 19 with an anion gap of 16.  I suspect this is related to alcohol ketoacidosis and he was given dextrose containing banana bag.  His lactic acidosis, which is likely related to his intoxication and liver disease, is also likely contributing to mild acidosis.  Jim required no further interventions while under my care but is unsafe for discharge given his frequent falls and generalized weakness.  Jim told me he would be willing to go to a rehab facility and I think this would be beneficial.  Obviously he is at risk for alcohol withdrawal but has no evidence of such at this time.  I answered his questions regarding plan for admission and he verbalized understanding.  I discussed the patient's case with Dr. Gomez, hospitalist, who accepts admission and has no further orders.    Covid-19  Jim Richard was evaluated during a  global COVID-19 pandemic, which necessitated consideration that the patient might be at risk for infection with the SARS-CoV-2 virus that causes COVID-19.   Applicable protocols for evaluation were followed during the patient's care.   COVID-19 was considered as part of the patient's evaluation. The plan for testing is:  a test was obtained at a previous visit and reviewed & considered today.    Diagnosis:    ICD-10-CM    1. Generalized weakness  R53.1    2. Falls frequently  R29.6    3. Alcohol dependence with uncomplicated intoxication (H)  F10.220    4. Lactic acidosis  E87.2    5. Pancytopenia (H)  D61.818    6. Stage 3b chronic kidney disease  N18.32    7. Skin tear of right elbow without complication, initial encounter  S51.011A    8. Alcoholic cirrhosis of liver with ascites (H)  K70.31          Scribe Disclosure:  I, Renetta Blanchard, am serving as a scribe at 10:48 PM on 3/10/2021 to document services personally performed by Cassie Baca MD based on my observations and the provider's statements to me.          Cassie Baca MD  03/11/21 0388

## 2021-03-11 NOTE — ED NOTES
[facility]  ED Nurse Handoff Report    ED Chief complaint: Alcohol Intoxication      ED Diagnosis:   Final diagnoses:   None       Code Status: see admitting MD    Allergies:   Allergies   Allergen Reactions     Amlodipine Swelling     Lisinopril      Other reaction(s): Angioedema  Mouth and tongue swelling   Mouth and tongue swelling          Patient Story: 66 year old male presents to the ED via ambulance because he keeps falling at home and is unable to care for himself due to alcohol intoxication.  Focused Assessment:  Pt is covered is bruises is all stages of healing.    Treatments and/or interventions provided: EKG, XR, labs, bananna bag, Tdap 0.5 mL  Patient's response to treatments and/or interventions: good    To be done/followed up on inpatient unit:  monitor    Does this patient have any cognitive concerns?: none    Activity level - Baseline/Home:  Independent  Activity Level - Current:   Stand with Assist    Patient's Preferred language: English   Needed?: No    Isolation: None  Infection: Not Applicable  Patient tested for COVID 19 prior to admission: YES  Bariatric?: No    Vital Signs:   Vitals:    03/10/21 2241   BP: 127/64   Pulse: 77   Resp: 16   Temp: 97.8  F (36.6  C)   TempSrc: Temporal   SpO2: 94%       Cardiac Rhythm:     Was the PSS-3 completed:   Yes  What interventions are required if any?               Family Comments: none  OBS brochure/video discussed/provided to patient/family: No              Name of person given brochure if not patient: na              Relationship to patient: na    For the majority of the shift this patient's behavior was Green.   Behavioral interventions performed were none.    ED NURSE PHONE NUMBER: [phone]

## 2021-03-11 NOTE — ED NOTES
Bed: ED16  Expected date: 3/10/21  Expected time: 10:40 PM  Means of arrival: Ambulance  Comments:  [facility] 427 66M intoxicated; weakness

## 2021-03-11 NOTE — PROVIDER NOTIFICATION
RECEIVING UNIT ED HANDOFF REVIEW    ED Nurse Handoff Report was reviewed by: Shannan Root RN on March 11, 2021 at 2:00 AM

## 2021-03-11 NOTE — PLAN OF CARE
[unfilled]                                                                              [facility]      OUTPATIENT PHYSICAL THERAPY EVALUATION  PLAN OF TREATMENT FOR OUTPATIENT REHABILITATION  (COMPLETE FOR INITIAL CLAIMS ONLY)  Patient's Last Name, First Name, M.I.  YOB: 1955  Jose ManuelJim  KVNG                        Provider's Name  [facility] Medical Record No.  [id number]                               Onset Date:  03/10/21   Start of Care Date:  03/10/21      Type:     _X_PT   ___OT   ___SLP Medical Diagnosis:  Difficulty walking                        PT Diagnosis:  difficuly walking   Visits from SOC:  1   _________________________________________________________________________________  Plan of Treatment/Functional Goals    Planned Interventions: balance training, gait training, strengthening, transfer training     Goals: See Physical Therapy Goals on Care Plan in [employer] electronic health record.    Therapy Frequency: Daily  Predicted Duration of Therapy Intervention: 4 days  _________________________________________________________________________________    I CERTIFY THE NEED FOR THESE SERVICES FURNISHED UNDER        THIS PLAN OF TREATMENT AND WHILE UNDER MY CARE     (Physician co-signature of this document indicates review and certification of the therapy plan).              Certification date from: 03/11/21, Certification date to: 03/18/21    Referring Physician: Jason            Initial Assessment        See Physical Therapy evaluation dated 03/10/21 in [employer] electronic health record.

## 2021-03-11 NOTE — ED TRIAGE NOTES
Daily drinker: at least a pint of hard liquor/day.  Last drink was 3pm.  States he didn't drink much today, so not feeling well today.  Covered in bruises.  States he has fallen over 20 times in the last couple months.

## 2021-03-11 NOTE — PROGRESS NOTES
Brief hospitalist note  Seen and examined  Patient with multiple admits for alcoholism, returns with drinking at home and generalized weakness, missed paracentesis appointment, had 5.6 L removed on 3/10  Feels he is starting to undergo withdrawal with shaking, admits to drinking 750 ml of alcohol daily since discharge  Also nauseous with urinary retention  Plan  - as per history and physical  - monitor for withdrawal, please call me if requires benzodiazepines for withdrawal as will qualify for inpatient status  - discussed with social work: they are calling his , home plan is not working. Briefly discussed TCU (he declined in the past) and he was noncommittal during discussions.   - restart home medications for prostatism, depression, etc  - bowel regimen  - PT pending. Historically, his weakness improves with sobriety

## 2021-03-11 NOTE — PROGRESS NOTES
03/11/21 1045   Quick Adds   Type of Visit Initial PT Evaluation   Living Environment   People in home other (see comments)  (roomate)   Current Living Arrangements house   Home Accessibility stairs to enter home;stairs within home   Number of Stairs, Main Entrance 6   Stair Railings, Main Entrance railings safe and in good condition   Number of Stairs, Within Home, Primary 6   Stair Railings, Within Home, Primary railings safe and in good condition   Living Environment Comments split level home   Self-Care   Usual Activity Tolerance good   Current Activity Tolerance fair   Activity/Exercise/Self-Care Comment independent with mobility   Disability/Function   Hearing Difficulty or Deaf no   Wear Glasses or Blind no   Concentrating, Remembering or Making Decisions Difficulty no   Difficulty Communicating no   Difficulty Eating/Swallowing no   Walking or Climbing Stairs Difficulty no   Doing Errands Independently Difficulty (such as shopping) no   Fall history within last six months no   General Information   Onset of Illness/Injury or Date of Surgery 03/10/21   Referring Physician Gomez   Pertinent History of Current Problem (include personal factors and/or comorbidities that impact the POC) Pt reports multiple falls in the last several weeks. Was in ED earlier in day after falling, also with prominent ascites. He underwent paracentesis earlier today with removal of 5600 ml of fluid. He was discharged home when he had another fall (no trauma or LOC) but was too weak to get up. Exam with distended, nontender abdomen.    Existing Precautions/Restrictions fall   Cognition   Orientation Status (Cognition) oriented x 4   Affect/Mental Status (Cognition) WFL   Follows Commands (Cognition) WFL   Posture    Posture Forward head position   Range of Motion (ROM)   ROM Comment B LE WFL   Strength   Strength Comments Generalized weakness, B hip flexion 4/5   Bed Mobility   Bed Mobility supine-sit   Supine-Sit [place]  (Bed Mobility) contact guard   Sit-Supine [place] (Bed Mobility) contact guard   Transfers   Transfers sit-stand transfer   Transfer Safety Concerns Noted decreased balance during turns   Impairments Contributing to Impaired Transfers impaired balance   Transfer Safety Comments WW for transfers   Gait/Stairs (Locomotion)   [place] Level (Gait) contact guard   Assistive Device (Gait) walker, front-wheeled   Comment (Gait/Stairs) dec step length,slow pace, unsteady throughout   Balance   Balance other (describe)   Balance Comments Good in sitting, inc sway in standing, CGA static standing   Sensory Examination   Sensory Perception Comments numbness R big toe baseline   Muscle Tone   Muscle Tone Comments resting tremors UE   Clinical Impression   Criteria for Skilled Therapeutic Intervention yes, treatment indicated   PT Diagnosis (PT) difficuly walking   Influenced by the following impairments tremors, weakness, impaired gait/balance   Functional limitations due to impairments impaired functional mobility   Clinical Presentation Stable/Uncomplicated   Clinical Decision Making (Complexity) low complexity   Therapy Frequency (PT) Daily   Predicted Duration of Therapy Intervention (days/wks) 4 days   Planned Therapy Interventions (PT) balance training;gait training;strengthening;transfer training   Risk & Benefits of therapy have been explained evaluation/treatment results reviewed;care plan/treatment goals reviewed;patient   PT Discharge Planning    PT Discharge Recommendation (DC Rec) Transitional Care Facility   PT Rationale for DC Rec Pt below baseline for functional mobility, req WW for ambulation, has multilevel home, will benefit from cont PT during stay and in rehab setting at [place]

## 2021-03-11 NOTE — PROGRESS NOTES
[facility]  Patient is currently open to home care services with [facility]. The patient is currently receiving RN and PT services.  Patient's  and home health team have been notified that patient is under OBSERVATION STATUS. [facility] Liaison will continue to follow patient during stay. If patient is admitted to inpatient status please provide orders to resume home care at time of discharge if appropriate.

## 2021-03-11 NOTE — H&P
[facility]    History and Physical  Hospitalist       Date of Admission:  3/10/2021  Date of Service (when I saw the patient): 03/11/21    Assessment & Plan   Jim Richard is a 66 year old male who presents with weakness    Asymptomatic COVID-19 pending on admission, low suspicion    Weakness  Alcoholic cirrhosis  Has paracentesis ~ every 2 weeks. Pt reports multiple falls in the last several weeks. Was in ED earlier in day after falling, also with prominent ascites. He underwent paracentesis earlier today with removal of 5600 ml of fluid. He was discharged home when he had another fall (no trauma or LOC) but was too weak to get up. Exam with distended, nontender abdomen. CXR clear. K, Mg ok. Lactic 2.7, lipase 703. TB 1.8. AST 89. EKG non-ischemic, QTc 501. No clear cause of new weakness, doesn't appear to be SBP by labs, cx pending, lytes and renal function stable, liver function relatively stable.   - UA pending  - PT consult  - Etoh management as below  - SW consult    Severe alcohol use disorder  H/o alcohol withdrawal  Etoh on presentation at 0.34, states only drank a small amount of vodka. On commitment 11/2020 through 5/2021 through [facility].   - [facility] protocol  - vitamins  - prn lorazepam  - SW as above    Chronic anemia and thrombocytopenia  H/o GI bleed  Note is made of prior banding of esophageal varices in 2018. Most recently he was hospitalized here from 1/22/2021 to 1/27/2021 for alcohol withdrawal and melena causing acute blood loss anemia. [facility] GI was consulted and repeat EGD showed esophagitis, grade I esophageal varices, portal hypertensive gastropathy, and erythematous duodenopathy, but no active bleeding  - hgb/ platelets stable on admission from previous  - monitor    BPH  H/o urinary retention as well as noncompliance with flomax.   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 creatinine 1.73 on admission.   -  avoid nephrotoxins  - monitor    COPD  Resume inhalers with med rec    Depression  Anxiety  - Resume meds with med rec    DVT Prophylaxis: Pneumatic Compression Devices and Ambulate every shift  Code Status: Full Code    Disposition: Expected discharge in 1-2 days.    Mark Gomez MD  [phone] (P)  Text Page     Primary Care Physician   FERNANDA BRANTLEY    Chief Complaint   weakness    History is obtained from the patient and medical records    History of Present Illness   Jim Richard is a 66 year old male who presents with falls.  He has a history of severe alcohol use disorder with subsequent cirrhosis and advanced liver disease.  He was just discharged from the hospital in mid February for severe ascites as well as encephalopathy secondary to alcohol use.  He is under the stay of commitment from November 2020 through May 2021.  He had actually been in the emergency department earlier on the day of presentation after having a fall.  He declined admission and also had a paracentesis of over 5 L.  He states he went home and he was ambulating when he fell.  He denies any head trauma.  He does report left-sided chest pain which is tender to palpation but she has had for several weeks.  After he fell he was unable to get up off the floor prompting his visit.  He states he was not dizzy or lightheaded.  He had no chest pain.  He did not think his legs got weak but rather he lost his balance and fell.  He denies any fevers or chills.  He denies shortness of breath.  He states he drank a small amount of alcohol but is breathalyzer was 0.34.    Past Medical History    I have reviewed this patient's medical history and updated it with pertinent information if needed.   Past Medical History:   Diagnosis Date     Alcoholism (H) 1/14/2013    had treatment at [facility]     Anxiety      Coronary artery disease 09/09/2014    Nuclear stress test with slight fixed defect inferiorly, no ischemia      Depression     w/anxiety     Esophageal varices with bleeding 04/28/2018    6 varices banded on EGD     Heart attack (H)      Hepatomegaly     Fatty liver, chronic alcoholic     Hyperlipemia      Hypertension      JANIS on CPAP      Peripheral vascular disease (H)      PVD (peripheral vascular disease) (H)      SDH (subdural hematoma) (H) 04/26/2018    Right side, resolved spontaneously     Spinal stenosis      Substance abuse (H)        Past Surgical History   I have reviewed this patient's surgical history and updated it with pertinent information if needed.  Past Surgical History:   Procedure Laterality Date     APPENDECTOMY       BYPASS GRAFT FEMOROPOPLITEAL  6/12/2012    Procedure: BYPASS GRAFT FEMOROPOPLITEAL;  LEFT FEMORAL TO ABOVE KNEE POPLITEAL BYPASS, ENDARTERECTOMY OF SFA AND PF ARTERIES, LEFT EXTERNAL ILIAC TO COMMON FEMORAL INTERPOSITION GRAFT;  Surgeon: Damir Roberts MD;  Location:  OR     COLONOSCOPY       COLONOSCOPY N/A 8/8/2019    Procedure: COLONOSCOPY;  Surgeon: Pavan Arguello MD;  Location:  GI     COLONOSCOPY N/A 3/10/2020    Procedure: COLONOSCOPY;  Surgeon: Rosendo Shaw MD;  Location:  GI     ENDARTERECTOMY FEMORAL  6/12/2012    Procedure: ENDARTERECTOMY FEMORAL;;  Surgeon: Damir Roberts MD;  Location:  OR     ESOPHAGOSCOPY, GASTROSCOPY, DUODENOSCOPY (EGD), COMBINED N/A 3/22/2018    Procedure: COMBINED ESOPHAGOSCOPY, GASTROSCOPY, DUODENOSCOPY (EGD);  ESOPHAGOSCOPY, GASTROSCOPY, DUODENOSOCPY.;  Surgeon: Valeri Pruitt MD;  Location:  OR     ESOPHAGOSCOPY, GASTROSCOPY, DUODENOSCOPY (EGD), COMBINED N/A 9/29/2018    Procedure: COMBINED ESOPHAGOSCOPY, GASTROSCOPY, DUODENOSCOPY (EGD);;  Surgeon: Rosendo Shaw MD;  Location:  GI     ESOPHAGOSCOPY, GASTROSCOPY, DUODENOSCOPY (EGD), COMBINED N/A 8/2/2019    Procedure: ESOPHAGOGASTRODUODENOSCOPY (EGD);  Surgeon: Pavan Arguello MD;  Location:  GI     ESOPHAGOSCOPY, GASTROSCOPY, DUODENOSCOPY (EGD), COMBINED  N/A 9/25/2019    Procedure: ESOPHAGOGASTRODUODENOSCOPY (EGD);  Surgeon: Pavan Arguello MD;  Location:  GI     ESOPHAGOSCOPY, GASTROSCOPY, DUODENOSCOPY (EGD), COMBINED N/A 3/8/2020    Procedure: ESOPHAGOGASTRODUODENOSCOPY (EGD);  Surgeon: Rosendo Shaw MD;  Location:  GI     ESOPHAGOSCOPY, GASTROSCOPY, DUODENOSCOPY (EGD), COMBINED N/A 6/11/2020    Procedure: ESOPHAGOGASTRODUODENOSCOPY (EGD);  Surgeon: Rosendo Shaw MD;  Location:  GI     ESOPHAGOSCOPY, GASTROSCOPY, DUODENOSCOPY (EGD), COMBINED N/A 1/23/2021    Procedure: ESOPHAGOGASTRODUODENOSCOPY (EGD);  Surgeon: Pavan Arguello MD;  Location:  GI     HERNIA REPAIR  2017    Abdominal     LAMINECTOMY, FUSION LUMBAR THREE+ LEVEL, COMBINED N/A 9/22/2014    Procedure: COMBINED LAMINECTOMY, FUSION LUMBAR THREE+ LEVEL;  Surgeon: Sebastien Pruitt MD;  Location:  OR     TONSILLECTOMY & ADENOIDECTOMY         Prior to Admission Medications   Prior to Admission Medications   Prescriptions Last Dose Informant Patient Reported? Taking?   QUEtiapine (SEROQUEL) 100 MG tablet   Yes No   Sig: Take 100 mg by mouth At Bedtime Filled 1/11/21 #30   QUEtiapine (SEROQUEL) 50 MG tablet   No No   Sig: Take 1 tablet (50 mg) by mouth 3 times daily as needed (anxiety) Filled 11/20/20 #30   acetaminophen (TYLENOL) 325 MG tablet   No No   Sig: Take 2 tablets (650 mg) by mouth every 4 hours as needed for mild pain   atenolol (TENORMIN) 25 MG tablet   Yes No   Sig: Take 25 mg by mouth daily Filled 9/9/20 #30   fluticasone-vilanterol (BREO ELLIPTA) 200-25 MCG/INH inhaler   No No   Sig: Inhale 1 puff into the lungs daily as needed (SOB)   folic acid (FOLVITE) 1 MG tablet   No No   Sig: Take 1 tablet (1 mg) by mouth daily   Patient taking differently: Take 1 mg by mouth daily Last filled 11/21/20 #30   hydrOXYzine (ATARAX) 50 MG tablet   No No   Sig: Take 1 tablet (50 mg) by mouth nightly as needed for other (sleep)   Patient taking differently: Take 50 mg by mouth  nightly as needed for other (sleep) Filled 1/11/21 #90   magnesium oxide (MAG-OX) 400 MG tablet   No No   Sig: Take 1 tablet (400 mg) by mouth daily   Patient taking differently: Take 400 mg by mouth daily Filled 11/21/20 #30   mirtazapine (REMERON) 30 MG tablet   Yes No   Sig: Take 30 mg by mouth At Bedtime Filled 1/11/21 for #30   multivitamin w/minerals (THERA-VIT-M) tablet   No No   Sig: Take 1 tablet by mouth daily   Patient taking differently: Take 1 tablet by mouth daily Filled 9/21/21 #30   nicotine (COMMIT) 2 MG lozenge   No No   Sig: Place 1 lozenge (2 mg) inside cheek every hour as needed for smoking cessation   Patient taking differently: Place 2 mg inside cheek every hour as needed for smoking cessation Filled 11/21/20 #30   nicotine (NICODERM CQ) 21 MG/24HR 24 hr patch   No No   Sig: Place 1 patch onto the skin daily   Patient taking differently: Place 1 patch onto the skin daily Filled 11/20/21 #30   pantoprazole (PROTONIX) 40 MG EC tablet   No No   Sig: Take 1 tablet (40 mg) by mouth 2 times daily   tamsulosin (FLOMAX) 0.4 MG capsule   No No   Sig: Take 2 capsules (0.8 mg) by mouth daily   traZODone (DESYREL) 100 MG tablet   Yes No   Sig: Take 100 mg by mouth At Bedtime Filled 1/11/21 #30   vortioxetine (TRINTELLIX) 10 MG tablet   No No   Sig: Take 1 tablet (10 mg) by mouth daily   Patient taking differently: Take 10 mg by mouth daily Filled 11/20/20 #30      Facility-Administered Medications: None     Allergies   Allergies   Allergen Reactions     Amlodipine Swelling     Lisinopril      Other reaction(s): Angioedema  Mouth and tongue swelling   Mouth and tongue swelling          Social History   I have reviewed this patient's social history and updated it with pertinent information if needed. Jim Richard  reports that he has been smoking cigarettes. He has been smoking about 1.00 pack per day. He has never used smokeless tobacco. He reports current alcohol use. He reports that he does not use  drugs.    Family History   I have reviewed this patient's family history and updated it with pertinent information if needed.   Family History   Problem Relation Age of Onset     Mental Illness Son      Coronary Artery Disease Early Onset Mother      Substance Abuse Father      Lung Cancer Father      Substance Abuse Brother      Unknown/Adopted No family hx of      Depression No family hx of      Anxiety Disorder No family hx of      Schizophrenia No family hx of      Bipolar Disorder No family hx of      Suicide No family hx of      Dementia No family hx of      [place] Disease No family hx of      Parkinsonism No family hx of      Autism Spectrum Disorder No family hx of      Intellectual Disability (Mental Retardation) No family hx of        Review of Systems   The 10 point Review of Systems is negative other than noted in the HPI or here.     Physical Exam   Temp: 98.1  F (36.7  C) Temp src: Oral BP: 126/48 Pulse: 77   Resp: 18 SpO2: 95 % O2 Device: None (Room air)    Vital Signs with Ranges  0 lbs 0 oz    Constitutional: alert, oriented and in no acute distress  Eyes: EOMI, PERRL  HEENT: OP clear  Respiratory: CTA B  Cardiovascular: RRR. No LE edema  GI: soft, distended, nontender  Lymph/Hematologic: no cervical LAD  Genitourinary: deferred  Skin: ecchymoses diffusely   Musculoskeletal: no deformities or arthritis  Neurologic: CN II-XII, BADILLO, tremulous  Psychiatric: anxious    Data   Data reviewed today:  I personally reviewed the EKG tracing showing no ischemia and the chest x-ray image(s) showing clear lungs.  Recent Labs   Lab 03/10/21  2252 03/10/21  0956   WBC 2.6* 2.4*   HGB 8.2* 8.2*   MCV 95 95   PLT 98* 100*   INR  --  1.27*    136   POTASSIUM 4.0 4.2   CHLORIDE 101 102   CO2 19* 19*   BUN 29 31*   CR 1.73* 1.80*   ANIONGAP 16* 15*   KEV 7.9* 8.0*   GLC 71 68*   ALBUMIN 3.1* 3.3*   PROTTOTAL 6.6* 7.1   BILITOTAL 1.8* 1.4*   ALKPHOS 269* 260*   ALT 39 38   AST 89* 85*   LIPASE 703*  --     TROPI 0.015 <0.015       Recent Results (from the past 24 hour(s))   CT Head w/o Contrast    Narrative    CT OF THE HEAD WITHOUT CONTRAST March 10, 2021 10:20 AM     HISTORY: Altered mental status.    TECHNIQUE: 5 mm thick axial CT images of the head were acquired  without IV contrast material. Radiation dose for this scan was reduced  using automated exposure control, adjustment of the mA and/or kV  according to patient size, or iterative reconstruction technique.    COMPARISON: Head CT 9/29/2018.    FINDINGS: There is mild diffuse cerebral volume loss. There are subtle  patchy areas of decreased density in the cerebral white matter  bilaterally that are consistent with sequela of chronic small vessel  ischemic disease.    The ventricles and basal cisterns are within normal limits in  configuration given the degree of cerebral volume loss. There is no  midline shift. There are no extra-axial fluid collections.    No intracranial hemorrhage, mass or recent infarct.    The visualized paranasal sinuses are well-aerated. There is no  mastoiditis. There are no fractures of the visualized bones.      Impression    IMPRESSION: Diffuse cerebral volume loss and cerebral white matter  changes consistent with chronic small vessel ischemic disease. No  evidence for acute intracranial pathology.            MARLO SANDERS MD   Elbow XR, G/E 3 views, right    Narrative    EXAM: XR ELBOW RT G/E 3 VW  LOCATION: [facility]  DATE/TIME: 3/10/2021 11:28 PM    INDICATION: Fall, pain  COMPARISON: None.      Impression    IMPRESSION: Degenerative spur at the lateral humeral condyle likely along the radial collateral ligament. Small olecranon enthesophyte. No visualized joint effusion or displaced fracture. No dislocation.   XR Chest 2 Views    Narrative    EXAM: XR CHEST 2 VW  LOCATION: [facility]  DATE/TIME: 3/10/2021 11:27 PM    INDICATION: Rib pain after fall  COMPARISON: 10/09/2020.      Impression     IMPRESSION: Heart size within normal limits for portable technique. Pulmonary vascularity normal. The lungs are clear. There are several old bilateral rib fractures. No definite findings for acute fractures.

## 2021-03-11 NOTE — CONSULTS
Care Management Initial Consult    General Information  Assessment completed with: Other, (Nancy Spleth, Behaviora )  Type of CM/SW Visit: Initial Assessment    Primary Care Provider verified and updated as needed:     Readmission within the last 30 days: previous discharge plan unsuccessful   Return Category: Exacerbation of disease  Reason for Consult: discharge planning  Advance Care Planning:            Communication Assessment  Patient's communication style: spoken language (English or Bilingual)    Hearing Difficulty or Deaf: no   Wear Glasses or Blind: no    Cognitive  Cognitive/Neuro/Behavioral: WDL                      Living Environment:   People in home: other (see comments)(roomate)     Current living Arrangements: house      Able to return to prior arrangements: other (see comments)(needs to go to CD treatment first)       Family/Social Support:  Care provided by: self  Provides care for:       Children, Sibling(s)          Description of Support System: Other (see comments)(patient doesn't utilize family support)    Support Assessment: Complicated family dynamics    Current Resources:   Patient receiving home care services:  [facility]     Community Resources:    Equipment currently used at home:    Supplies currently used at home:      Employment/Financial:  Employment Status:          Financial Concerns:   None noted          Lifestyle & Psychosocial Needs:        Socioeconomic History     Marital status: Single     Spouse name: Not on file     Number of children: 2     Years of education: Not on file     Highest education level: Not on file   Occupational History     Occupation: Printer, on disability     Tobacco Use     Smoking status: Current Every Day Smoker     Packs/day: 1.00     Types: Cigarettes     Smokeless tobacco: Never Used     Tobacco comment: Chantix caused nightmares   Substance and Sexual Activity     Alcohol use: Yes     Comment: reports he drinks .75 of vodka every 2  days     Drug use: No     Sexual activity: Not Currently       Functional Status:  Prior to admission patient needed assistance: independent with self cares when he is not drinking             Mental Health Status:  Mental Health Status: Current Concern       Chemical Dependency Status:  Chemical Dependency Status: Current Concern  Chemical Dependency Management: Previous treatment  Patient is under a Chemical Dependency Commitment with a provisional d/ischarge to his home.  He has been failing at home with relapses of alcohol usage.  His BA on 3/10 was 0.34.          Values/Beliefs:  Spiritual, Cultural Beliefs, [employer] Practices, Values that affect care:                 Additional Information:  Writer contacted patient's behavioral  Omayra Nelson at [phone].    Updated that patient is admitted due to alcohol intoxication.  Transfer to detox was not an option as patient is weak and not able to ambulate on his own.  Her plan has been for patient to admit to [facility] Intensive Out-Patient Program (IOP) however the timing has not worked out well.  Today writer is suggesting we make a referral to [facility] for residential admission.   Patient usually shows stabiliy in residential programs.  Once  the [facility] team feels patient can manage going home and attend IOP, they can transition patient to an IOP.  Ms Nelson is in agreement with this plan and will call patient in his room to explain this.    She reports patient should have a current assessment on file with [facility].  PLAN:  Tomorrow morning writer will be calling [place].    NIKKI Ceja

## 2021-03-11 NOTE — PHARMACY-ADMISSION MEDICATION HISTORY
Pharmacy Medication History  Admission medication history interview status for the 3/10/2021  admission is complete. See EPIC admission navigator for prior to admission medications     Location of Interview: Phone  Medication history sources: Patient    Significant changes made to the medication list:  none    In the past week, patient estimated taking medication this percent of the time: greater than 90%    Additional medication history information:   none    Medication reconciliation completed by provider prior to medication history? No    Time spent in this activity: 15min    Prior to Admission medications    Medication Sig Last Dose Taking? Auth Provider   acetaminophen (TYLENOL) 325 MG tablet Take 2 tablets (650 mg) by mouth every 4 hours as needed for mild pain prn Yes Jeffrey Villagomez MD   atenolol (TENORMIN) 25 MG tablet Take 25 mg by mouth daily Filled 9/9/20 #30 3/10/2021 at am Yes Unknown, Entered By History   fluticasone-vilanterol (BREO ELLIPTA) 200-25 MCG/INH inhaler Inhale 1 puff into the lungs daily as needed (SOB) prn Yes Jeffrey Villagomez MD   folic acid (FOLVITE) 1 MG tablet Take 1 mg by mouth daily 3/10/2021 at am Yes Unknown, Entered By History   hydrOXYzine (ATARAX) 50 MG tablet Take 1 tablet (50 mg) by mouth nightly as needed for other (sleep)  Patient taking differently: Take 50 mg by mouth nightly as needed for other (sleep) Filled 1/11/21 #90 prn Yes Jeffrey Villagomez MD   magnesium oxide (MAG-OX) 400 (240 Mg) MG tablet Take 400 mg by mouth daily 3/10/2021 at am Yes Unknown, Entered By History   mirtazapine (REMERON) 30 MG tablet Take 30 mg by mouth At Bedtime Filled 1/11/21 for #30 3/9/2021 at hs Yes Unknown, Entered By History   multivitamin w/minerals (THERA-VIT-M) tablet Take 1 tablet by mouth daily  Patient taking differently: Take 1 tablet by mouth daily Filled 9/21/21 #30 3/10/2021 at Unknown time Yes Jeffrey Villagomez MD   nicotine (COMMIT) 2 MG lozenge Place 1  lozenge (2 mg) inside cheek every hour as needed for smoking cessation  Patient taking differently: Place 2 mg inside cheek every hour as needed for smoking cessation Filled 11/21/20 #30 prn Yes Jeffrey Villagomez MD   nicotine (NICODERM CQ) 21 MG/24HR 24 hr patch Place 1 patch onto the skin daily  Patient taking differently: Place 1 patch onto the skin daily Filled 11/20/21 #30 Past Week at Unknown time Yes Jeffrey Villagomez MD   pantoprazole (PROTONIX) 40 MG EC tablet Take 1 tablet (40 mg) by mouth 2 times daily 3/10/2021 at Unknown time Yes Sara Orellana MD   QUEtiapine (SEROQUEL) 100 MG tablet Take 100 mg by mouth At Bedtime Filled 1/11/21 #30 3/9/2021 Yes Unknown, Entered By History   QUEtiapine (SEROQUEL) 50 MG tablet Take 1 tablet (50 mg) by mouth 3 times daily as needed (anxiety) Filled 11/20/20 #30 prn Yes Sara Orellana MD   tamsulosin (FLOMAX) 0.4 MG capsule Take 2 capsules (0.8 mg) by mouth daily 3/10/2021 at Unknown time Yes Sara Orellana MD   traZODone (DESYREL) 100 MG tablet Take 100 mg by mouth At Bedtime Filled 1/11/21 #30 3/9/2021 at hs Yes Unknown, Entered By History   vortioxetine (TRINTELLIX) 10 MG tablet Take 1 tablet (10 mg) by mouth daily  Patient taking differently: Take 10 mg by mouth daily Filled 11/20/20 #30 3/10/2021 at am Yes Jeffrey Villagomez MD       The information provided in this note is only as accurate as the sources available at the time of update(s)

## 2021-03-12 ENCOUNTER — APPOINTMENT (OUTPATIENT)
Dept: ULTRASOUND IMAGING | Facility: CLINIC | Age: 66
DRG: 897 | End: 2021-03-12
Attending: HOSPITALIST
Payer: MEDICARE

## 2021-03-12 ENCOUNTER — APPOINTMENT (OUTPATIENT)
Dept: PHYSICAL THERAPY | Facility: CLINIC | Age: 66
DRG: 897 | End: 2021-03-12
Payer: MEDICARE

## 2021-03-12 PROBLEM — S51.011A SKIN TEAR OF RIGHT ELBOW WITHOUT COMPLICATION, INITIAL ENCOUNTER: Status: ACTIVE | Noted: 2021-03-12

## 2021-03-12 PROBLEM — N18.32 STAGE 3B CHRONIC KIDNEY DISEASE (H): Status: ACTIVE | Noted: 2021-03-12

## 2021-03-12 PROBLEM — F10.939 ALCOHOL WITHDRAWAL (H): Status: ACTIVE | Noted: 2021-03-12

## 2021-03-12 PROBLEM — F10.220 ALCOHOL DEPENDENCE WITH UNCOMPLICATED INTOXICATION (H): Status: ACTIVE | Noted: 2021-03-12

## 2021-03-12 PROBLEM — D61.818 PANCYTOPENIA (H): Status: ACTIVE | Noted: 2021-03-12

## 2021-03-12 PROBLEM — E87.20 LACTIC ACIDOSIS: Status: ACTIVE | Noted: 2020-10-14

## 2021-03-12 PROBLEM — R29.6 FALLS FREQUENTLY: Status: ACTIVE | Noted: 2021-03-12

## 2021-03-12 LAB
ANION GAP SERPL CALCULATED.3IONS-SCNC: 12 MMOL/L (ref 3–14)
BUN SERPL-MCNC: 29 MG/DL (ref 7–30)
CALCIUM SERPL-MCNC: 8.3 MG/DL (ref 8.5–10.1)
CHLORIDE SERPL-SCNC: 97 MMOL/L (ref 94–109)
CO2 SERPL-SCNC: 20 MMOL/L (ref 20–32)
CREAT SERPL-MCNC: 2.08 MG/DL (ref 0.66–1.25)
GFR SERPL CREATININE-BSD FRML MDRD: 32 ML/MIN/{1.73_M2}
GLUCOSE SERPL-MCNC: 168 MG/DL (ref 70–99)
POTASSIUM SERPL-SCNC: 3.7 MMOL/L (ref 3.4–5.3)
SODIUM SERPL-SCNC: 129 MMOL/L (ref 133–144)

## 2021-03-12 PROCEDURE — 0W9G3ZZ DRAINAGE OF PERITONEAL CAVITY, PERCUTANEOUS APPROACH: ICD-10-PCS | Performed by: RADIOLOGY

## 2021-03-12 PROCEDURE — 97116 GAIT TRAINING THERAPY: CPT | Mod: GP

## 2021-03-12 PROCEDURE — 120N000001 HC R&B MED SURG/OB

## 2021-03-12 PROCEDURE — 49083 ABD PARACENTESIS W/IMAGING: CPT

## 2021-03-12 PROCEDURE — 250N000009 HC RX 250: Performed by: HOSPITALIST

## 2021-03-12 PROCEDURE — 80048 BASIC METABOLIC PNL TOTAL CA: CPT | Performed by: HOSPITALIST

## 2021-03-12 PROCEDURE — 999N000154 HC STATISTIC RADIOLOGY XRAY, US, CT, MAR, NM

## 2021-03-12 PROCEDURE — G0378 HOSPITAL OBSERVATION PER HR: HCPCS

## 2021-03-12 PROCEDURE — 36415 COLL VENOUS BLD VENIPUNCTURE: CPT | Performed by: HOSPITALIST

## 2021-03-12 PROCEDURE — 99232 SBSQ HOSP IP/OBS MODERATE 35: CPT | Performed by: HOSPITALIST

## 2021-03-12 PROCEDURE — 250N000013 HC RX MED GY IP 250 OP 250 PS 637: Performed by: INTERNAL MEDICINE

## 2021-03-12 PROCEDURE — 250N000013 HC RX MED GY IP 250 OP 250 PS 637: Performed by: HOSPITALIST

## 2021-03-12 PROCEDURE — 97530 THERAPEUTIC ACTIVITIES: CPT | Mod: GP

## 2021-03-12 PROCEDURE — HZ2ZZZZ DETOXIFICATION SERVICES FOR SUBSTANCE ABUSE TREATMENT: ICD-10-PCS | Performed by: HOSPITALIST

## 2021-03-12 RX ORDER — LIDOCAINE HYDROCHLORIDE 10 MG/ML
10 INJECTION, SOLUTION EPIDURAL; INFILTRATION; INTRACAUDAL; PERINEURAL ONCE
Status: COMPLETED | OUTPATIENT
Start: 2021-03-12 | End: 2021-03-12

## 2021-03-12 RX ADMIN — Medication 400 MG: at 08:45

## 2021-03-12 RX ADMIN — PANTOPRAZOLE SODIUM 40 MG: 40 TABLET, DELAYED RELEASE ORAL at 21:23

## 2021-03-12 RX ADMIN — QUETIAPINE 50 MG: 25 TABLET ORAL at 06:03

## 2021-03-12 RX ADMIN — VORTIOXETINE 10 MG: 10 TABLET, FILM COATED ORAL at 08:45

## 2021-03-12 RX ADMIN — MIRTAZAPINE 30 MG: 15 TABLET, FILM COATED ORAL at 21:23

## 2021-03-12 RX ADMIN — THIAMINE HCL TAB 100 MG 200 MG: 100 TAB at 08:45

## 2021-03-12 RX ADMIN — LORAZEPAM 1 MG: 1 TABLET ORAL at 00:47

## 2021-03-12 RX ADMIN — SENNOSIDES AND DOCUSATE SODIUM 1 TABLET: 8.6; 5 TABLET ORAL at 21:23

## 2021-03-12 RX ADMIN — QUETIAPINE 50 MG: 25 TABLET ORAL at 21:23

## 2021-03-12 RX ADMIN — ACETAMINOPHEN 650 MG: 325 TABLET, FILM COATED ORAL at 14:36

## 2021-03-12 RX ADMIN — MULTIPLE VITAMINS W/ MINERALS TAB 1 TABLET: TAB at 08:45

## 2021-03-12 RX ADMIN — PANTOPRAZOLE SODIUM 40 MG: 40 TABLET, DELAYED RELEASE ORAL at 08:45

## 2021-03-12 RX ADMIN — FOLIC ACID 1 MG: 1 TABLET ORAL at 08:45

## 2021-03-12 RX ADMIN — THIAMINE HCL TAB 100 MG 200 MG: 100 TAB at 17:51

## 2021-03-12 RX ADMIN — LIDOCAINE HYDROCHLORIDE: 20 JELLY TOPICAL at 05:13

## 2021-03-12 RX ADMIN — TAMSULOSIN HYDROCHLORIDE 0.8 MG: 0.4 CAPSULE ORAL at 08:45

## 2021-03-12 RX ADMIN — HYDROXYZINE HYDROCHLORIDE 50 MG: 25 TABLET, FILM COATED ORAL at 23:41

## 2021-03-12 RX ADMIN — SENNOSIDES AND DOCUSATE SODIUM 1 TABLET: 8.6; 5 TABLET ORAL at 08:45

## 2021-03-12 RX ADMIN — THIAMINE HCL TAB 100 MG 200 MG: 100 TAB at 21:23

## 2021-03-12 RX ADMIN — NICOTINE 1 PATCH: 21 PATCH, EXTENDED RELEASE TRANSDERMAL at 08:48

## 2021-03-12 RX ADMIN — QUETIAPINE 50 MG: 25 TABLET ORAL at 13:14

## 2021-03-12 RX ADMIN — Medication 1 MG: at 21:23

## 2021-03-12 RX ADMIN — LIDOCAINE HYDROCHLORIDE 10 ML: 10 INJECTION, SOLUTION EPIDURAL; INFILTRATION; INTRACAUDAL; PERINEURAL at 12:26

## 2021-03-12 RX ADMIN — ACETAMINOPHEN 650 MG: 325 TABLET, FILM COATED ORAL at 20:10

## 2021-03-12 ASSESSMENT — ACTIVITIES OF DAILY LIVING (ADL)
ADLS_ACUITY_SCORE: 15
ADLS_ACUITY_SCORE: 15
ADLS_ACUITY_SCORE: 13

## 2021-03-12 NOTE — PLAN OF CARE
DATE & TIME: 3/11/21-3/12-21 9548-0967  Cognitive Concerns/ Orientation : A&Ox4, calm/cooperative.   BEHAVIOR & AGGRESSION TOOL COLOR: green   CIWA SCORE: 5,8,5,7 Ativan given x1, scoring for tremors, anxiety and mild visual hallucinations. Seroquel given x1, effective.   ABNL VS/O2: VSS on RA  MOBILITY: Assist of 1, GB, walker  PAIN MANAGMENT: c/o L rib cage pain, managed with tylenol x1.   DIET: Regular diet   BOWEL/BLADDER: hx of urinary retention, order to straight cath if no void per shift, straight cathed once this shift. Requests lidocaine with catheterization.   ABNL LAB/BG: NA  DRAIN/DEVICES: PIV, saline locked  TELEMETRY RHYTHM: n/a  SKIN: scattered bruises; large bruises on bilateral forearms; paracentesis site on R lower abd, bandage CDI.   TESTS/PROCEDURES: n/a  D/C DAY/GOALS/PLACE: pending, 1-2 days, SW involved.  OTHER IMPORTANT INFO: Abd rounded, distended, firm. PT consulted.

## 2021-03-12 NOTE — PROGRESS NOTES
[facility]    Medicine Progress Note - Hospitalist Service       Date of Admission:  3/10/2021  Assessment & Plan           alcohol withdrawal  Severe alcohol use disorder  Etoh on presentation at 0.34, states only drank a small amount of vodka. On commitment 11/2020 through 5/2021 through [facility].   Overnight on 3/11-3/12 did develop worsening tremulousness, hallucinations necessitating multiple dosages of ativan  - CIWA protocol  - vitamins  - prn lorazepam  - SW as above    Weakness  Alcoholic cirrhosis  Has paracentesis ~ every 2 weeks. Pt reports multiple falls in the last several weeks.   Status post paracentesis in the ED  - repeat paracentesis, he reports worsening distention and weekend tomorrow  - ?TCU  - PT consult. Historically he has either declined TCU or he has improved functionally with sobriety and gone home.   - Etoh management as below  - SW consult     Chronic anemia and thrombocytopenia  H/o GI bleed  Note is made of prior banding of esophageal varices in 2018. Most recently he was hospitalized here from 1/22/2021 to 1/27/2021 for alcohol withdrawal and melena causing acute blood loss anemia. [facility] was consulted and repeat EGD showed esophagitis, grade I esophageal varices, portal hypertensive gastropathy, and erythematous duodenopathy, but no active bleeding  - hgb/ platelets stable on admission from previous  - monitor     BPH  H/o urinary retention as well as noncompliance with flomax.   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 creatinine 1.73 on admission.   - avoid nephrotoxins  - monitor     COPD  Resume inhalers with med rec     Depression  Anxiety  - Resume meds with med rec       Diet: Regular Diet Adult    DVT Prophylaxis: Pneumatic Compression Devices  Leger Catheter: not present  Code Status: Full Code           Disposition Plan   Expected discharge: 2-3 days, recommended to prior living  arrangement once withdrawal done.  Entered: Mark Fan DO 03/12/2021, 1:31 PM       The patient's care was discussed with the Patient.    Mark Fan DO  Hospitalist Service  [facility]  Contact information available via [facility] Paging/Directory    ______________________________________________________________________    Interval History   Developed increased tremulousness, hallucinations received ativan x 4 and seroquel x1 for anxiety and withdrawal from alcohol.     States he is not eating or drinking now.      Data reviewed today: I reviewed all medications, new labs and imaging results over the last 24 hours. I personally reviewed no images or EKG's today.    Physical Exam   Vital Signs: Temp: 98.7  F (37.1  C) Temp src: Oral BP: (!) 149/77 Pulse: 88   Resp: 16 SpO2: 99 % O2 Device: None (Room air)    Weight: 167 lbs 11.2 oz  Constitutional: alert, oriented, in moderate distress, tremulous  Eyes: EOMI, PERRL  HEENT: OP clear  Respiratory: CTA B  Cardiovascular: RRR. No LE edema  GI: soft, distended, nontender  Lymph/Hematologic: no cervical LAD  Genitourinary: deferred  Skin: ecchymoses diffusely   Musculoskeletal: no deformities or arthritis  Neurologic: CN II-XII, BADILLO, tremulous  Psychiatric: anxious    Data   Recent Labs   Lab 03/12/21  1155 03/10/21  2252 03/10/21  0956   WBC  --  2.6* 2.4*   HGB  --  8.2* 8.2*   MCV  --  95 95   PLT  --  98* 100*   INR  --   --  1.27*   * 136 136   POTASSIUM 3.7 4.0 4.2   CHLORIDE 97 101 102   CO2 20 19* 19*   BUN 29 29 31*   CR 2.08* 1.73* 1.80*   ANIONGAP 12 16* 15*   KEV 8.3* 7.9* 8.0*   * 71 68*   ALBUMIN  --  3.1* 3.3*   PROTTOTAL  --  6.6* 7.1   BILITOTAL  --  1.8* 1.4*   ALKPHOS  --  269* 260*   ALT  --  39 38   AST  --  89* 85*   LIPASE  --  703*  --    TROPI  --  0.015 <0.015     No results found for this or any previous visit (from the past 24 hour(s)).  Medications     - MEDICATION INSTRUCTIONS -          folic acid  1 mg Oral Daily     magnesium oxide  400 mg Oral Daily     mirtazapine  30 mg Oral At Bedtime     multivitamin w/minerals  1 tablet Oral Daily     nicotine  1 patch Transdermal Daily     nicotine   Transdermal Q8H     pantoprazole  40 mg Oral BID     QUEtiapine  50 mg Oral At Bedtime     senna-docusate  1 tablet Oral BID     tamsulosin  0.8 mg Oral Daily     thiamine  200 mg Oral TID    Followed by     [START ON 3/13/2021] thiamine  100 mg Oral TID    Followed by     [START ON 3/18/2021] thiamine  100 mg Oral Daily     vortioxetine  10 mg Oral Daily

## 2021-03-12 NOTE — PROGRESS NOTES
Care Management Follow Up    Length of Stay (days): 0    Expected Discharge Date: 03/14/21(TCU)     Concerns to be Addressed: discharge planning     Patient plan of care discussed at interdisciplinary rounds: Yes    Anticipated Discharge Disposition: Inpatient Chemical Dependency     Anticipated Discharge Services: Chemical Dependency Resources  Anticipated Discharge DME: None    Patient/family educated on Medicare website which has current facility and service quality ratings:    Education Provided on the Discharge Plan:    Patient/Family in Agreement with the Plan: (need to talk with patient)    Referrals Placed by CM/SW: Behavioral Health  Private pay costs discussed:     Additional Information:  Writer is asking patient's behavioral  Omayra Nelson [phone], to call [place] to discuss eventual residential admission.  RN notes and PT note reviewed.  This is a familiar pattern with patient that TCU is recommended and often by the time patient is thru withdrawal his mobility improves to the point that he doesn't qualify for TCU.  Writer will follow patient's progress.  Anticipate discharge either directly to [facility] or to a TCU.        TANA CejaSW

## 2021-03-12 NOTE — PROCEDURES
[facility]    Procedure: Paracentesis.     Date/Time: 3/12/2021 12:36 PM  Performed by: Adriana Orellana DO  Authorized by: Adriana Orellana DO     UNIVERSAL PROTOCOL   Site Marked: Yes  Prior Images Obtained and Reviewed:  Yes  Required items: Required blood products, implants, devices and special equipment available    Patient identity confirmed:  Verbally with patient, arm band, provided demographic data and hospital-assigned identification number  Patient was reevaluated immediately before administering moderate or deep sedation or anesthesia  Confirmation Checklist:  Patient's identity using two indicators, relevant allergies, procedure was appropriate and matched the consent or emergent situation and correct equipment/implants were available  Time out: Immediately prior to the procedure a time out was called    Universal Protocol: the Joint Commission Universal Protocol was followed    Preparation: Patient was prepped and draped in usual sterile fashion           ANESTHESIA    Anesthesia: Local infiltration  Local Anesthetic:  Lidocaine 1% without epinephrine      SEDATION    Patient Sedated: No    See dictated procedure note for full details.  Findings: Paracentesis.     Specimens: none    Complications: None    Condition: Stable    PROCEDURE   Patient Tolerance:  Patient tolerated the procedure well with no immediate complications    Length of time physician/provider present for 1:1 monitoring during sedation: 0

## 2021-03-12 NOTE — PLAN OF CARE
Summary:     DATE & TIME: 3/11/2021 6145-5163  Cognitive Concerns/ Orientation : A&Ox4, calm/cooperative.   BEHAVIOR & AGGRESSION TOOL COLOR: green   CIWA SCORE: 3, 6, 8, 3, 8, 4 (ativan given twice, scoring for tremors, anxiety and nausea)  ABNL VS/O2: VSS on RA  MOBILITY: Assist of 1, GB, walker  PAIN MANAGMENT: c/o L rib cage pain, managed with tylenol.   DIET: Regular diet   BOWEL/BLADDER: hx of urinary retention, order to straight cath if no void per shift, straight cathed twice today.   ABNL LAB/BG: Blood alcohol 0.34; Creat 1.73; Lipase 703; Lactic acid 2.7  DRAIN/DEVICES: PIV, saline locked   TELEMETRY RHYTHM: n/a  SKIN: scattered bruises; large bruises on bilateral forearms; paracentesis site on R lower abd, bandage CDI.   TESTS/PROCEDURES: n/a  D/C DAY/GOALS/PLACE: pending, 1-2 days, SW involved.  OTHER IMPORTANT INFO: Abd rounded, distended, firm. PT consulted. Seroquel given for anxiety.

## 2021-03-12 NOTE — PLAN OF CARE
Pt A&Ox4, VSS on RA, CIWAs 9 (1mg ativan given) & 5, denies pain. Walked in muniz. Up A1 GB/W. Straight cathed at 1930 for 275 out. Bruised forearms. PT & SW consults.     Observation goals PRIOR TO DISCHARGE     Comments: -diagnostic tests and consults completed and resulted -NOT MET  -vital signs normal or at patient baseline -MET  -tolerating oral intake to maintain hydration -MET  -adequate pain control on oral analgesics -MET  -returns to baseline functional status -NOT MET, CIWA scoring  -safe disposition plan has been identified -NOT MET  Nurse to notify provider when observation goals have been met and patient is ready for discharge.

## 2021-03-12 NOTE — PROGRESS NOTES
1224 Time Out done.    1224 Procedure started     Paracentesis: Pt tolerated well. VSS. 4.1L dark yellow fluid removed from abdomen w/o difficulty. Bandaid applied to site - CDI.     1244 Procedure completed    1249 Pt back to rm 636-2 per cart & transport.

## 2021-03-13 LAB
AFP SERPL-MCNC: 3.9 UG/L (ref 0–8)
ALBUMIN SERPL-MCNC: 2.9 G/DL (ref 3.4–5)
ALP SERPL-CCNC: 255 U/L (ref 40–150)
ALT SERPL W P-5'-P-CCNC: 37 U/L (ref 0–70)
ANION GAP SERPL CALCULATED.3IONS-SCNC: 6 MMOL/L (ref 3–14)
AST SERPL W P-5'-P-CCNC: 71 U/L (ref 0–45)
BASOPHILS # BLD AUTO: 0 10E9/L (ref 0–0.2)
BASOPHILS NFR BLD AUTO: 0.7 %
BILIRUB SERPL-MCNC: 1.3 MG/DL (ref 0.2–1.3)
BUN SERPL-MCNC: 28 MG/DL (ref 7–30)
CALCIUM SERPL-MCNC: 8.8 MG/DL (ref 8.5–10.1)
CHLORIDE SERPL-SCNC: 97 MMOL/L (ref 94–109)
CO2 SERPL-SCNC: 25 MMOL/L (ref 20–32)
CREAT SERPL-MCNC: 2.1 MG/DL (ref 0.66–1.25)
DIFFERENTIAL METHOD BLD: ABNORMAL
EOSINOPHIL # BLD AUTO: 0 10E9/L (ref 0–0.7)
EOSINOPHIL NFR BLD AUTO: 0.4 %
ERYTHROCYTE [DISTWIDTH] IN BLOOD BY AUTOMATED COUNT: 19.3 % (ref 10–15)
FERRITIN SERPL-MCNC: 92 NG/ML (ref 26–388)
FOLATE SERPL-MCNC: 57 NG/ML
GFR SERPL CREATININE-BSD FRML MDRD: 32 ML/MIN/{1.73_M2}
GLUCOSE SERPL-MCNC: 129 MG/DL (ref 70–99)
HCT VFR BLD AUTO: 26.2 % (ref 40–53)
HEMOCCULT STL QL: POSITIVE
HGB BLD-MCNC: 8.6 G/DL (ref 13.3–17.7)
IMM GRANULOCYTES # BLD: 0 10E9/L (ref 0–0.4)
IMM GRANULOCYTES NFR BLD: 0.4 %
INR PPP: 1.33 (ref 0.86–1.14)
IRON SATN MFR SERPL: 15 % (ref 15–46)
IRON SERPL-MCNC: 28 UG/DL (ref 35–180)
LACTATE BLD-SCNC: 1.2 MMOL/L (ref 0.7–2)
LIPASE SERPL-CCNC: 490 U/L (ref 73–393)
LYMPHOCYTES # BLD AUTO: 0.3 10E9/L (ref 0.8–5.3)
LYMPHOCYTES NFR BLD AUTO: 10.1 %
MAGNESIUM SERPL-MCNC: 1.7 MG/DL (ref 1.6–2.3)
MCH RBC QN AUTO: 30.5 PG (ref 26.5–33)
MCHC RBC AUTO-ENTMCNC: 32.8 G/DL (ref 31.5–36.5)
MCV RBC AUTO: 93 FL (ref 78–100)
MONOCYTES # BLD AUTO: 0.4 10E9/L (ref 0–1.3)
MONOCYTES NFR BLD AUTO: 13.4 %
NEUTROPHILS # BLD AUTO: 2 10E9/L (ref 1.6–8.3)
NEUTROPHILS NFR BLD AUTO: 75 %
NRBC # BLD AUTO: 0 10*3/UL
NRBC BLD AUTO-RTO: 0 /100
PHOSPHATE SERPL-MCNC: 1.6 MG/DL (ref 2.5–4.5)
PLATELET # BLD AUTO: 61 10E9/L (ref 150–450)
POTASSIUM SERPL-SCNC: 3.7 MMOL/L (ref 3.4–5.3)
PROT SERPL-MCNC: 6.6 G/DL (ref 6.8–8.8)
RBC # BLD AUTO: 2.82 10E12/L (ref 4.4–5.9)
RETICS # AUTO: 44 10E9/L (ref 25–95)
RETICS/RBC NFR AUTO: 1.6 % (ref 0.5–2)
SODIUM SERPL-SCNC: 128 MMOL/L (ref 133–144)
TIBC SERPL-MCNC: 189 UG/DL (ref 240–430)
VIT B12 SERPL-MCNC: 1066 PG/ML (ref 193–986)
WBC # BLD AUTO: 2.7 10E9/L (ref 4–11)

## 2021-03-13 PROCEDURE — 85025 COMPLETE CBC W/AUTO DIFF WBC: CPT | Performed by: HOSPITALIST

## 2021-03-13 PROCEDURE — 36415 COLL VENOUS BLD VENIPUNCTURE: CPT | Performed by: HOSPITALIST

## 2021-03-13 PROCEDURE — 83540 ASSAY OF IRON: CPT | Performed by: HOSPITALIST

## 2021-03-13 PROCEDURE — 82105 ALPHA-FETOPROTEIN SERUM: CPT | Performed by: HOSPITALIST

## 2021-03-13 PROCEDURE — 83735 ASSAY OF MAGNESIUM: CPT | Performed by: HOSPITALIST

## 2021-03-13 PROCEDURE — 999N000157 HC STATISTIC RCP TIME EA 10 MIN

## 2021-03-13 PROCEDURE — 80053 COMPREHEN METABOLIC PANEL: CPT | Performed by: HOSPITALIST

## 2021-03-13 PROCEDURE — 86803 HEPATITIS C AB TEST: CPT | Performed by: HOSPITALIST

## 2021-03-13 PROCEDURE — 82272 OCCULT BLD FECES 1-3 TESTS: CPT | Performed by: HOSPITALIST

## 2021-03-13 PROCEDURE — 83550 IRON BINDING TEST: CPT | Performed by: HOSPITALIST

## 2021-03-13 PROCEDURE — 250N000013 HC RX MED GY IP 250 OP 250 PS 637: Performed by: INTERNAL MEDICINE

## 2021-03-13 PROCEDURE — 250N000011 HC RX IP 250 OP 636: Performed by: INTERNAL MEDICINE

## 2021-03-13 PROCEDURE — 250N000013 HC RX MED GY IP 250 OP 250 PS 637: Performed by: HOSPITALIST

## 2021-03-13 PROCEDURE — 86708 HEPATITIS A ANTIBODY: CPT | Performed by: HOSPITALIST

## 2021-03-13 PROCEDURE — 83690 ASSAY OF LIPASE: CPT | Performed by: HOSPITALIST

## 2021-03-13 PROCEDURE — 82607 VITAMIN B-12: CPT | Performed by: HOSPITALIST

## 2021-03-13 PROCEDURE — 83605 ASSAY OF LACTIC ACID: CPT | Performed by: HOSPITALIST

## 2021-03-13 PROCEDURE — 86706 HEP B SURFACE ANTIBODY: CPT | Performed by: HOSPITALIST

## 2021-03-13 PROCEDURE — 85045 AUTOMATED RETICULOCYTE COUNT: CPT | Performed by: HOSPITALIST

## 2021-03-13 PROCEDURE — 84100 ASSAY OF PHOSPHORUS: CPT | Performed by: HOSPITALIST

## 2021-03-13 PROCEDURE — 82746 ASSAY OF FOLIC ACID SERUM: CPT | Performed by: HOSPITALIST

## 2021-03-13 PROCEDURE — 99233 SBSQ HOSP IP/OBS HIGH 50: CPT | Performed by: HOSPITALIST

## 2021-03-13 PROCEDURE — 85610 PROTHROMBIN TIME: CPT | Performed by: HOSPITALIST

## 2021-03-13 PROCEDURE — 87340 HEPATITIS B SURFACE AG IA: CPT | Performed by: HOSPITALIST

## 2021-03-13 PROCEDURE — 120N000001 HC R&B MED SURG/OB

## 2021-03-13 PROCEDURE — 82728 ASSAY OF FERRITIN: CPT | Performed by: HOSPITALIST

## 2021-03-13 PROCEDURE — 94660 CPAP INITIATION&MGMT: CPT

## 2021-03-13 RX ORDER — CARBOXYMETHYLCELLULOSE SODIUM 5 MG/ML
2 SOLUTION/ DROPS OPHTHALMIC
Status: DISCONTINUED | OUTPATIENT
Start: 2021-03-13 | End: 2021-03-17 | Stop reason: HOSPADM

## 2021-03-13 RX ADMIN — THIAMINE HCL TAB 100 MG 100 MG: 100 TAB at 15:46

## 2021-03-13 RX ADMIN — MULTIPLE VITAMINS W/ MINERALS TAB 1 TABLET: TAB at 09:00

## 2021-03-13 RX ADMIN — MIRTAZAPINE 30 MG: 15 TABLET, FILM COATED ORAL at 21:10

## 2021-03-13 RX ADMIN — QUETIAPINE 50 MG: 25 TABLET ORAL at 04:56

## 2021-03-13 RX ADMIN — ACETAMINOPHEN 650 MG: 325 TABLET, FILM COATED ORAL at 04:56

## 2021-03-13 RX ADMIN — PANTOPRAZOLE SODIUM 40 MG: 40 TABLET, DELAYED RELEASE ORAL at 09:00

## 2021-03-13 RX ADMIN — PANTOPRAZOLE SODIUM 40 MG: 40 TABLET, DELAYED RELEASE ORAL at 21:11

## 2021-03-13 RX ADMIN — TAMSULOSIN HYDROCHLORIDE 0.8 MG: 0.4 CAPSULE ORAL at 08:59

## 2021-03-13 RX ADMIN — QUETIAPINE 50 MG: 25 TABLET ORAL at 21:10

## 2021-03-13 RX ADMIN — THIAMINE HCL TAB 100 MG 100 MG: 100 TAB at 21:10

## 2021-03-13 RX ADMIN — Medication 400 MG: at 09:00

## 2021-03-13 RX ADMIN — ONDANSETRON 4 MG: 4 TABLET, ORALLY DISINTEGRATING ORAL at 09:50

## 2021-03-13 RX ADMIN — NICOTINE 1 PATCH: 21 PATCH, EXTENDED RELEASE TRANSDERMAL at 09:00

## 2021-03-13 RX ADMIN — Medication 2 DROP: at 21:13

## 2021-03-13 RX ADMIN — POTASSIUM & SODIUM PHOSPHATES POWDER PACK 280-160-250 MG 1 PACKET: 280-160-250 PACK at 21:09

## 2021-03-13 RX ADMIN — Medication 2 DROP: at 23:46

## 2021-03-13 RX ADMIN — QUETIAPINE 50 MG: 25 TABLET ORAL at 09:49

## 2021-03-13 RX ADMIN — SENNOSIDES AND DOCUSATE SODIUM 1 TABLET: 8.6; 5 TABLET ORAL at 09:00

## 2021-03-13 RX ADMIN — VORTIOXETINE 10 MG: 10 TABLET, FILM COATED ORAL at 09:00

## 2021-03-13 RX ADMIN — POTASSIUM & SODIUM PHOSPHATES POWDER PACK 280-160-250 MG 1 PACKET: 280-160-250 PACK at 15:46

## 2021-03-13 RX ADMIN — SENNOSIDES AND DOCUSATE SODIUM 1 TABLET: 8.6; 5 TABLET ORAL at 21:10

## 2021-03-13 RX ADMIN — Medication 2 DROP: at 20:10

## 2021-03-13 RX ADMIN — FOLIC ACID 1 MG: 1 TABLET ORAL at 09:00

## 2021-03-13 RX ADMIN — ACETAMINOPHEN 650 MG: 325 TABLET, FILM COATED ORAL at 20:10

## 2021-03-13 RX ADMIN — QUETIAPINE 50 MG: 25 TABLET ORAL at 17:21

## 2021-03-13 RX ADMIN — THIAMINE HCL TAB 100 MG 100 MG: 100 TAB at 08:59

## 2021-03-13 RX ADMIN — HYDROXYZINE HYDROCHLORIDE 50 MG: 25 TABLET, FILM COATED ORAL at 23:45

## 2021-03-13 ASSESSMENT — ACTIVITIES OF DAILY LIVING (ADL)
DIFFICULTY_EATING/SWALLOWING: NO
CONCENTRATING,_REMEMBERING_OR_MAKING_DECISIONS_DIFFICULTY: NO
DRESSING/BATHING_DIFFICULTY: NO
ADLS_ACUITY_SCORE: 15
WALKING_OR_CLIMBING_STAIRS_DIFFICULTY: NO
ADLS_ACUITY_SCORE: 13
TOILETING_ISSUES: NO
ADLS_ACUITY_SCORE: 15
DOING_ERRANDS_INDEPENDENTLY_DIFFICULTY: NO
ADLS_ACUITY_SCORE: 15
WEAR_GLASSES_OR_BLIND: NO
ADLS_ACUITY_SCORE: 13
DIFFICULTY_COMMUNICATING: NO
ADLS_ACUITY_SCORE: 15
HEARING_DIFFICULTY_OR_DEAF: NO

## 2021-03-13 NOTE — PLAN OF CARE
DATE & TIME: 3/13/2021 0961-0197  Cognitive Concerns/ Orientation : A&Ox4, calm/cooperative.   BEHAVIOR & AGGRESSION TOOL COLOR: Green  CIWA SCORE: 3, 3, 4  ABNL VS/O2: VSS on RA  MOBILITY: Up with SBA, GB/walker; ambulates in muniz with staff often  PAIN MANAGMENT: denies pain  DIET: Regular diet, good appetite   BOWEL/BLADDER: Continent; voiding adequately, BM x1   ABNL LAB/BG: Ph 1.5, neutra phos started.   DRAIN/DEVICES: PIV SL  TELEMETRY RHYTHM: n/a  SKIN: scattered bruises; large bruises on bilateral forearms; paracentesis site on R lower abd, bandage CDI.   TESTS/PROCEDURES: none  D/C DAY/GOALS/PLACE: pending, 2-3 days, SW involved.  OTHER IMPORTANT INFO: Abd rounded, distended, firm. C/o anxiety, PRN Seroquel given x2.

## 2021-03-13 NOTE — PLAN OF CARE
DATE & TIME: 3/1/2021 9830-5147  Cognitive Concerns/ Orientation : A&Ox4, calm/cooperative.   BEHAVIOR & AGGRESSION TOOL COLOR: Green  CIWA SCORE: 5, 5, 6, 6  ABNL VS/O2: VSS on RA  MOBILITY: Up with assist of 1, GB/walker  PAIN MANAGMENT: c/o L abdomen and hip pain, PRN tylenol given x2 during the night  DIET: Regular diet, good appetite   BOWEL/BLADDER: Continent; voiding adequately   ABNL LAB/BG: Na 129, Creat 2.08, see results  DRAIN/DEVICES: PIV, saline locked  TELEMETRY RHYTHM: n/a  SKIN: scattered bruises; large bruises on bilateral forearms; paracentesis site on R lower abd, bandage CDI.   TESTS/PROCEDURES: none  D/C DAY/GOALS/PLACE: pending, 1-2 days, SW involved.  OTHER IMPORTANT INFO: Abd rounded, distended, firm. PRN Seroquel given x 1 at 0456. Scheduled Seroquel given for anxiety at bedtime. PRN Melatonin given x1. PRN Atarax given x1 per pt request. Pt stated that he sleeps with a CPAP at home, CPAP orders were obtain and pt wore CPAP for half of the night.

## 2021-03-13 NOTE — PLAN OF CARE
DATE & TIME: 3/12/2021 5151-8445  Cognitive Concerns/ Orientation : A&Ox4, calm/cooperative.   BEHAVIOR & AGGRESSION TOOL COLOR: Green  CIWA SCORE: 4, 4, 4  ABNL VS/O2: VSS on RA  MOBILITY: Up with assist of 1, GB/walker  PAIN MANAGMENT: c/o L rib cage pain, tylenol given x1  DIET: Regular diet, good appetite   BOWEL/BLADDER: Continent; voiding adequately   ABNL LAB/BG: Na 129 - dropped from 136 on 3/10; Creat 2.08  DRAIN/DEVICES: PIV, saline locked  TELEMETRY RHYTHM: n/a  SKIN: scattered bruises; large bruises on bilateral forearms; paracentesis site on R lower abd, bandage CDI.   TESTS/PROCEDURES: US Paracentesis today  D/C DAY/GOALS/PLACE: pending, 1-2 days, SW involved.  OTHER IMPORTANT INFO: Abd rounded, distended, firm. PT. C/o anxiety - Seroquel given x1

## 2021-03-13 NOTE — PROGRESS NOTES
[facility]    Medicine Progress Note - Hospitalist Service       Date of Admission:  3/10/2021  Assessment & Plan        Jim Richard is a 66 year old male with alcohol dependence and cirrhosis secondary to alcohol who was admitted due to alcohol intoxication and weakness.    Acute compicate alcohol withdrawal with hallucinosis   Severe alcohol use disorder  Alcohol level on presentation was 0.34. The patient is on a commitment 11/2020 through 5/2021 through [facility].   Overnight on 3/11-3/12 he developed worsening tremulousness, hallucinations necessitating multiple dosages of ativan.  Last 3 CIWA scores= 5,6,6.  He is still having withdrawal symptoms this morning.    Continue CIWA protocol with lorazepam as needed    Quetiapine as needed     He is already on a CD commitment    Elevated lipase- likely chemical pancreatitis from alcohol   Improving and asymptomatic- no further imaging at this time    Alcoholic cirrhosis  Chronic ascites  History of banding of esophageal varices in 2018  History of esophagitis, grade I esophageal varices, portal hypertensive gastropathy, and erythematous duodenopathy  Albumin was 3.1, INR 1.3, TB 1.3, Na 129, Creatinine 2  He has paracenteses ~ every 2 weeks. Pt reports multiple falls in the last several weeks.   Status post paracentesis in the ED- no peritonitis.    Repeat paracentesis planned    Repeat labs today     Chronic anemia and thrombocytopenia  Pancytopenia  Hemoglobin   Date Value Ref Range Status   03/10/2021 8.2 (L) 13.3 - 17.7 g/dL Final   03/10/2021 8.2 (L) 13.3 - 17.7 g/dL Final     Platelet Count   Date Value Ref Range Status   03/10/2021 98 (L) 150 - 450 10e9/L Final     Check fecal occult blood, iron, B12 and folate levels, smear    Also check HIV status     Benign prostatic hypertrophy  History of urinary retention as well as noncompliance with medications.    Continue tamsulosin     Straight cath as needed      Stage 3-4  chronic kidney disease   Hyponatremia   Creatinine   Date Value Ref Range Status   03/12/2021 2.08 (H) 0.66 - 1.25 mg/dL Final     Sodium   Date Value Ref Range Status   03/12/2021 129 (L) 133 - 144 mmol/L Final     Repeat labs today    Check a urine Na and morning serum cortisol     Chronic obstructive lung disease   Tobacco use     Inhaled bronchodilators PRN    Continue nicotine      Depression  Anxiety    Continue quetiapine, vortioxetine, mirtazapine     Diet: Regular Diet Adult    DVT Prophylaxis: Pneumatic Compression Devices  Leger Catheter: not present  Code Status: Full Code      Disposition Plan   Expected discharge: 2 - 3 days, recommended to prior living arrangement once alcohol WD has resolved.  Entered: Mauricio Ramírez MD 03/13/2021, 8:58 AM     The patient's care was discussed with the Patient.    Mauricio Ramírez MD  Hospitalist Service  [facility]  Contact information available via [facility] Paging/Directory    ______________________________________________________________________    Interval History   Feeling anxious but no abdominal pain.  He does not feel that he needs a paracentesis today.    Data reviewed today: I reviewed all medications, new labs and imaging results over the last 24 hours. I personally reviewed no images or EKG's today.    Physical Exam   Vital Signs: Temp: 98.7  F (37.1  C) Temp src: Oral BP: 94/52 Pulse: 111   Resp: 18 SpO2: 96 % O2 Device: None (Room air)    Weight: 173 lbs 15.09 oz  Constitutional: awake, alert, cooperative, no apparent distress  Respiratory: No increased work of breathing, good air exchange, clear to auscultation bilaterally, no crackles or wheezing  Cardiovascular: tachycardic, normal S1 and S2, no S3 or S4, and no murmur noted  GI:  normal bowel sounds, soft, non-distended, non-tender  Skin: no rashes and no jaundice  Neurologic: Awake, alert, oriented to name, place and time.  Cranial nerves II-XII are grossly  intact.  Motor is 5 out of 5 bilaterally. Mild upper extremity tremor and tongue fasciculations   Neuropsychiatric: General: normal, calm and normal eye contact    Data   Recent Labs   Lab 03/12/21  1155 03/10/21  2252 03/10/21  0956   WBC  --  2.6* 2.4*   HGB  --  8.2* 8.2*   MCV  --  95 95   PLT  --  98* 100*   INR  --   --  1.27*   * 136 136   POTASSIUM 3.7 4.0 4.2   CHLORIDE 97 101 102   CO2 20 19* 19*   BUN 29 29 31*   CR 2.08* 1.73* 1.80*   ANIONGAP 12 16* 15*   KEV 8.3* 7.9* 8.0*   * 71 68*   ALBUMIN  --  3.1* 3.3*   PROTTOTAL  --  6.6* 7.1   BILITOTAL  --  1.8* 1.4*   ALKPHOS  --  269* 260*   ALT  --  39 38   AST  --  89* 85*   LIPASE  --  703*  --    TROPI  --  0.015 <0.015     Recent Results (from the past 24 hour(s))   US Paracentesis    Narrative    US PARACENTESIS 3/12/2021 12:57 PM     HISTORY: HIGH VOLUME paracentesis with or without diagnostic fluid  analysis with labs to be drawn if ordered. Total paracentesis volume  as much as possible.    FINDINGS: Limited preprocedure ultrasound was performed, images show a  sufficient amount of ascites for paracentesis. An image was archived.  Written and oral informed consent was obtained. A pause for the cause  procedure to verify the correct patient and correct procedure. The  skin overlying the right lower quadrant was prepped and draped in the  usual sterile fashion. The subcutaneous tissues were anesthetized with  10mL 1% Lidocaine. Under direct ultrasound guidance the catheter was  advanced into the peritoneal space and 4.1 L of  straw colored fluid  was drained. The catheter was removed and a sterile dressing was  applied. There were no immediate complications. Ultrasound images were  permanently stored.  Patient left the ultrasound suite in satisfactory  condition.      Impression    IMPRESSION: Technically successful paracentesis without immediate  complications.    JAVIER JUDD, DO     Medications     - MEDICATION INSTRUCTIONS -          folic acid  1 mg Oral Daily     magnesium oxide  400 mg Oral Daily     mirtazapine  30 mg Oral At Bedtime     multivitamin w/minerals  1 tablet Oral Daily     nicotine  1 patch Transdermal Daily     nicotine   Transdermal Q8H     pantoprazole  40 mg Oral BID     QUEtiapine  50 mg Oral At Bedtime     senna-docusate  1 tablet Oral BID     tamsulosin  0.8 mg Oral Daily     thiamine  100 mg Oral TID    Followed by     [START ON 3/18/2021] thiamine  100 mg Oral Daily     vortioxetine  10 mg Oral Daily

## 2021-03-13 NOTE — PROVIDER NOTIFICATION
MD Notification    Notified Person: Sindi    Notification Date/Time: 3/13/21 0000    Notification Interaction: text page    Purpose of Notification: Patient states that he uses CPAP at home. Can I have some orders for CPAP?     Orders Received: CPAP orders placed    Comments:

## 2021-03-14 ENCOUNTER — APPOINTMENT (OUTPATIENT)
Dept: PHYSICAL THERAPY | Facility: CLINIC | Age: 66
DRG: 897 | End: 2021-03-14
Payer: MEDICARE

## 2021-03-14 LAB
ANION GAP SERPL CALCULATED.3IONS-SCNC: 8 MMOL/L (ref 3–14)
BASOPHILS # BLD AUTO: 0 10E9/L (ref 0–0.2)
BASOPHILS NFR BLD AUTO: 0.9 %
BUN SERPL-MCNC: 30 MG/DL (ref 7–30)
CALCIUM SERPL-MCNC: 8 MG/DL (ref 8.5–10.1)
CHLORIDE SERPL-SCNC: 101 MMOL/L (ref 94–109)
CO2 SERPL-SCNC: 24 MMOL/L (ref 20–32)
CORTIS SERPL-MCNC: 17 UG/DL (ref 4–22)
CREAT SERPL-MCNC: 2.19 MG/DL (ref 0.66–1.25)
DIFFERENTIAL METHOD BLD: ABNORMAL
EOSINOPHIL # BLD AUTO: 0 10E9/L (ref 0–0.7)
EOSINOPHIL NFR BLD AUTO: 0.3 %
ERYTHROCYTE [DISTWIDTH] IN BLOOD BY AUTOMATED COUNT: 20 % (ref 10–15)
GFR SERPL CREATININE-BSD FRML MDRD: 30 ML/MIN/{1.73_M2}
GLUCOSE SERPL-MCNC: 148 MG/DL (ref 70–99)
HAV IGG SER QL IA: REACTIVE
HBV SURFACE AB SERPL IA-ACNC: 0 M[IU]/ML
HBV SURFACE AG SERPL QL IA: NONREACTIVE
HCT VFR BLD AUTO: 25.5 % (ref 40–53)
HCV AB SERPL QL IA: NONREACTIVE
HGB BLD-MCNC: 8.3 G/DL (ref 13.3–17.7)
HIV 1+2 AB+HIV1 P24 AG SERPL QL IA: NONREACTIVE
IMM GRANULOCYTES # BLD: 0 10E9/L (ref 0–0.4)
IMM GRANULOCYTES NFR BLD: 0.3 %
LACTATE BLD-SCNC: 1.1 MMOL/L (ref 0.7–2)
LYMPHOCYTES # BLD AUTO: 0.3 10E9/L (ref 0.8–5.3)
LYMPHOCYTES NFR BLD AUTO: 10.2 %
MCH RBC QN AUTO: 30.9 PG (ref 26.5–33)
MCHC RBC AUTO-ENTMCNC: 32.5 G/DL (ref 31.5–36.5)
MCV RBC AUTO: 95 FL (ref 78–100)
MONOCYTES # BLD AUTO: 0.5 10E9/L (ref 0–1.3)
MONOCYTES NFR BLD AUTO: 15 %
NEUTROPHILS # BLD AUTO: 2.4 10E9/L (ref 1.6–8.3)
NEUTROPHILS NFR BLD AUTO: 73.3 %
NRBC # BLD AUTO: 0 10*3/UL
NRBC BLD AUTO-RTO: 0 /100
PLATELET # BLD AUTO: 76 10E9/L (ref 150–450)
POTASSIUM SERPL-SCNC: 3.8 MMOL/L (ref 3.4–5.3)
RBC # BLD AUTO: 2.69 10E12/L (ref 4.4–5.9)
RETICS # AUTO: 42.5 10E9/L (ref 25–95)
RETICS/RBC NFR AUTO: 1.6 % (ref 0.5–2)
SODIUM SERPL-SCNC: 133 MMOL/L (ref 133–144)
WBC # BLD AUTO: 3.3 10E9/L (ref 4–11)

## 2021-03-14 PROCEDURE — 250N000013 HC RX MED GY IP 250 OP 250 PS 637: Performed by: INTERNAL MEDICINE

## 2021-03-14 PROCEDURE — 85060 BLOOD SMEAR INTERPRETATION: CPT | Performed by: PATHOLOGY

## 2021-03-14 PROCEDURE — 80048 BASIC METABOLIC PNL TOTAL CA: CPT | Performed by: HOSPITALIST

## 2021-03-14 PROCEDURE — 83605 ASSAY OF LACTIC ACID: CPT | Performed by: HOSPITALIST

## 2021-03-14 PROCEDURE — 97530 THERAPEUTIC ACTIVITIES: CPT | Mod: GP | Performed by: PHYSICAL THERAPY ASSISTANT

## 2021-03-14 PROCEDURE — 85025 COMPLETE CBC W/AUTO DIFF WBC: CPT | Performed by: HOSPITALIST

## 2021-03-14 PROCEDURE — 87389 HIV-1 AG W/HIV-1&-2 AB AG IA: CPT | Performed by: HOSPITALIST

## 2021-03-14 PROCEDURE — 36415 COLL VENOUS BLD VENIPUNCTURE: CPT | Performed by: HOSPITALIST

## 2021-03-14 PROCEDURE — 250N000011 HC RX IP 250 OP 636: Performed by: INTERNAL MEDICINE

## 2021-03-14 PROCEDURE — 82533 TOTAL CORTISOL: CPT | Performed by: HOSPITALIST

## 2021-03-14 PROCEDURE — 258N000003 HC RX IP 258 OP 636: Performed by: HOSPITALIST

## 2021-03-14 PROCEDURE — 99233 SBSQ HOSP IP/OBS HIGH 50: CPT | Performed by: HOSPITALIST

## 2021-03-14 PROCEDURE — 250N000013 HC RX MED GY IP 250 OP 250 PS 637: Performed by: HOSPITALIST

## 2021-03-14 PROCEDURE — 97116 GAIT TRAINING THERAPY: CPT | Mod: GP | Performed by: PHYSICAL THERAPY ASSISTANT

## 2021-03-14 PROCEDURE — 85045 AUTOMATED RETICULOCYTE COUNT: CPT | Performed by: HOSPITALIST

## 2021-03-14 PROCEDURE — 999N001109 HC STATISTIC MORPHOLOGY W/INTERP HISTOLOGY TC 85060: Performed by: HOSPITALIST

## 2021-03-14 PROCEDURE — 120N000001 HC R&B MED SURG/OB

## 2021-03-14 RX ORDER — SIMETHICONE 80 MG
80 TABLET,CHEWABLE ORAL EVERY 6 HOURS PRN
Status: DISCONTINUED | OUTPATIENT
Start: 2021-03-14 | End: 2021-03-17 | Stop reason: HOSPADM

## 2021-03-14 RX ADMIN — QUETIAPINE 50 MG: 25 TABLET ORAL at 21:32

## 2021-03-14 RX ADMIN — MIRTAZAPINE 30 MG: 15 TABLET, FILM COATED ORAL at 21:33

## 2021-03-14 RX ADMIN — FOLIC ACID 1 MG: 1 TABLET ORAL at 09:00

## 2021-03-14 RX ADMIN — SENNOSIDES AND DOCUSATE SODIUM 1 TABLET: 8.6; 5 TABLET ORAL at 21:33

## 2021-03-14 RX ADMIN — POTASSIUM & SODIUM PHOSPHATES POWDER PACK 280-160-250 MG 1 PACKET: 280-160-250 PACK at 16:28

## 2021-03-14 RX ADMIN — THIAMINE HCL TAB 100 MG 100 MG: 100 TAB at 16:28

## 2021-03-14 RX ADMIN — THIAMINE HCL TAB 100 MG 100 MG: 100 TAB at 09:00

## 2021-03-14 RX ADMIN — QUETIAPINE 50 MG: 25 TABLET ORAL at 16:31

## 2021-03-14 RX ADMIN — ONDANSETRON 4 MG: 4 TABLET, ORALLY DISINTEGRATING ORAL at 09:51

## 2021-03-14 RX ADMIN — QUETIAPINE 50 MG: 25 TABLET ORAL at 08:59

## 2021-03-14 RX ADMIN — ACETAMINOPHEN 650 MG: 325 TABLET, FILM COATED ORAL at 22:34

## 2021-03-14 RX ADMIN — QUETIAPINE 50 MG: 25 TABLET ORAL at 23:27

## 2021-03-14 RX ADMIN — VORTIOXETINE 10 MG: 10 TABLET, FILM COATED ORAL at 09:00

## 2021-03-14 RX ADMIN — THIAMINE HCL TAB 100 MG 100 MG: 100 TAB at 21:33

## 2021-03-14 RX ADMIN — TAMSULOSIN HYDROCHLORIDE 0.8 MG: 0.4 CAPSULE ORAL at 09:00

## 2021-03-14 RX ADMIN — PANTOPRAZOLE SODIUM 40 MG: 40 TABLET, DELAYED RELEASE ORAL at 09:00

## 2021-03-14 RX ADMIN — Medication 400 MG: at 09:00

## 2021-03-14 RX ADMIN — POTASSIUM & SODIUM PHOSPHATES POWDER PACK 280-160-250 MG 1 PACKET: 280-160-250 PACK at 21:34

## 2021-03-14 RX ADMIN — SENNOSIDES AND DOCUSATE SODIUM 1 TABLET: 8.6; 5 TABLET ORAL at 09:00

## 2021-03-14 RX ADMIN — ACETAMINOPHEN 650 MG: 325 TABLET, FILM COATED ORAL at 13:30

## 2021-03-14 RX ADMIN — POTASSIUM & SODIUM PHOSPHATES POWDER PACK 280-160-250 MG 1 PACKET: 280-160-250 PACK at 08:59

## 2021-03-14 RX ADMIN — SODIUM CHLORIDE 1000 ML: 9 INJECTION, SOLUTION INTRAVENOUS at 10:19

## 2021-03-14 RX ADMIN — ACETAMINOPHEN 650 MG: 325 TABLET, FILM COATED ORAL at 18:26

## 2021-03-14 RX ADMIN — NICOTINE 1 PATCH: 21 PATCH, EXTENDED RELEASE TRANSDERMAL at 09:03

## 2021-03-14 RX ADMIN — MULTIPLE VITAMINS W/ MINERALS TAB 1 TABLET: TAB at 09:00

## 2021-03-14 RX ADMIN — PANTOPRAZOLE SODIUM 40 MG: 40 TABLET, DELAYED RELEASE ORAL at 21:33

## 2021-03-14 ASSESSMENT — ACTIVITIES OF DAILY LIVING (ADL)
ADLS_ACUITY_SCORE: 13

## 2021-03-14 NOTE — PROVIDER NOTIFICATION
MD Notification    Notified Person: MD    Notified Person Name: Darrell    Notification Date/Time:3/14/21 0909    Notification Interaction:paged MD    Purpose of Notification: BP 84/47, recheck 78/49; slightly dizzy, in bed & resting, feels okay now    Orders Received:    Comments:

## 2021-03-14 NOTE — PLAN OF CARE
DATE & TIME: 3/14/2021 [id number]  Cognitive Concerns/ Orientation : A&Ox4, calm/cooperative.   BEHAVIOR & AGGRESSION TOOL COLOR: Green  CIWA SCORE: 4, 4  ABNL VS/O2: VSS on RA  MOBILITY: Up with SBA, GB/walker; ambulates in muniz with staff during the evening  PAIN MANAGMENT: tylenol given x1  DIET: Regular diet, good appetite   BOWEL/BLADDER: Continent; voiding adequately, BM x1, stool sample sent to lab   ABNL LAB/BG: Ph 1.5, neutra phos started.   DRAIN/DEVICES: PIV SL  TELEMETRY RHYTHM: n/a  SKIN: scattered bruises; large bruises on bilateral forearms; paracentesis site on R lower abd, bandage CDI.   TESTS/PROCEDURES: none  D/C DAY/GOALS/PLACE: pending, 2-3 days, SW involved.  OTHER IMPORTANT INFO: Abd rounded, distended, firm. Scheduled Seroquel given at bedtime. PRN atarax given to help sleep. PRN eye drops given in right eye during the evening. Pt refused CPAP overnight but sleep comfortably through the night.

## 2021-03-14 NOTE — PROGRESS NOTES
[facility]    Medicine Progress Note - Hospitalist Service       Date of Admission:  3/10/2021  Assessment & Plan        Jim Richard is a 66 year old male with alcohol dependence and cirrhosis secondary to alcohol who was admitted due to alcohol intoxication and weakness.    Acute compicate alcohol withdrawal with hallucinosis   Severe alcohol use disorder  Alcohol level on presentation was 0.34. The patient is on a commitment 11/2020 through 5/2021 through [facility].   Overnight on 3/11-3/12 he developed worsening tremulousness, hallucinations necessitating multiple dosages of ativan.  Last 3 CIWA scores= 4,4,4. No hallucinations and tremor better.    Continue CIWA protocol with lorazepam as needed    Quetiapine as needed     He is already on a CD commitment    Hypotension, acute on probably chronic due to   Worse today.    Give a fluid bolus and consider a unit of packed red blood cells    Check lactic acid and cortisol    Transthoracic echocardiogram     Consider holding tamsulosin     Consider midodrine     Elevated lipase- likely chemical pancreatitis from alcohol   Improving and asymptomatic- no further imaging at this time    Alcoholic cirrhosis  Chronic ascites  History of banding of esophageal varices in 2018  History of esophagitis, grade I esophageal varices, portal hypertensive gastropathy, and erythematous duodenopathy  Fecal occult blood positive  Albumin was 3.1, INR 1.3, TB 1.3, Na 129, Creatinine 2  He has paracenteses ~ every 2 weeks. Pt reports multiple falls in the last several weeks.   Status post paracentesis in the ED- no peritonitis. No abdominal pain. Fecal occult blood positive but the patient is not aware of blood in his stool or black stools.    Hold off on paracentesis for now     Will ask gastroenterology to see for positive fecal occult blood      Chronic anemia and thrombocytopenia  Pancytopenia  Hemoglobin   Date Value Ref Range Status    03/14/2021 8.3 (L) 13.3 - 17.7 g/dL Final   03/13/2021 8.6 (L) 13.3 - 17.7 g/dL Final     Platelet Count   Date Value Ref Range Status   03/14/2021 76 (L) 150 - 450 10e9/L Final   Fecal occult blood positive;  iron, B12 and folate levels ok,  Smear pending.    Follow up smear and HIV     GI consult for fecal occult blood positive     Benign prostatic hypertrophy  History of urinary retention as well as noncompliance with medications.    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   Hyponatremia   Creatinine   Date Value Ref Range Status   03/14/2021 2.19 (H) 0.66 - 1.25 mg/dL Final     Sodium   Date Value Ref Range Status   03/14/2021 133 133 - 144 mmol/L Final     Monitor     Chronic obstructive lung disease   Tobacco use     Inhaled bronchodilators PRN    Continue nicotine      Depression  Anxiety    Continue quetiapine, vortioxetine, mirtazapine     Diet: Regular Diet Adult    DVT Prophylaxis: Pneumatic Compression Devices  Leger Catheter: not present  Code Status: Full Code      Disposition Plan   Expected discharge: 2 - 3 days, recommended to prior living arrangement once alcohol WD has resolved, blood pressure stable  Entered: Mauricio Ramírez MD 03/14/2021, 9:46 AM     The patient's care was discussed with the Patient.    Mauricio Ramírez MD  Hospitalist Service  [facility]  Contact information available via [facility] Paging/Directory    ______________________________________________________________________    Interval History   Felt dizzy getting up- blood pressure low.  No chest pain.      Data reviewed today: I reviewed all medications, new labs and imaging results over the last 24 hours. I personally reviewed no images or EKG's today.    Physical Exam   Vital Signs: Temp: 98.6  F (37  C) Temp src: Oral BP:  (!) 78/49 Pulse: 93   Resp: 16 SpO2: 96 % O2 Device: None (Room air)    Weight: 173 lbs 15.09 oz  Constitutional: awake, alert, cooperative, no apparent distress  Respiratory: No increased work of breathing, good air exchange, clear to auscultation bilaterally, no crackles or wheezing  Cardiovascular: Regular rate, normal S1 and S2, no S3 or S4, and no murmur noted  GI:  normal bowel sounds, soft, non-distended, non-tender  Skin: no rashes and no jaundice  Neurologic: Awake, alert, oriented to name, place and time.  Cranial nerves II-XII are grossly intact.  Motor is 5 out of 5 bilaterally. Mild upper extremity tremor and tongue fasciculations are better  Neuropsychiatric: General: normal, calm and normal eye contact    Data   Recent Labs   Lab 03/14/21  0830 03/13/21  0946 03/12/21  1155 03/10/21  2252 03/10/21  0956   WBC 3.3* 2.7*  --  2.6* 2.4*   HGB 8.3* 8.6*  --  8.2* 8.2*   MCV 95 93  --  95 95   PLT 76* 61*  --  98* 100*   INR  --  1.33*  --   --  1.27*    128* 129* 136 136   POTASSIUM 3.8 3.7 3.7 4.0 4.2   CHLORIDE 101 97 97 101 102   CO2 24 25 20 19* 19*   BUN 30 28 29 29 31*   CR 2.19* 2.10* 2.08* 1.73* 1.80*   ANIONGAP 8 6 12 16* 15*   KEV 8.0* 8.8 8.3* 7.9* 8.0*   * 129* 168* 71 68*   ALBUMIN  --  2.9*  --  3.1* 3.3*   PROTTOTAL  --  6.6*  --  6.6* 7.1   BILITOTAL  --  1.3  --  1.8* 1.4*   ALKPHOS  --  255*  --  269* 260*   ALT  --  37  --  39 38   AST  --  71*  --  89* 85*   LIPASE  --  490*  --  703*  --    TROPI  --   --   --  0.015 <0.015     No results found for this or any previous visit (from the past 24 hour(s)).  Medications     - MEDICATION INSTRUCTIONS -         sodium chloride 0.9%  1,000 mL Intravenous Once     folic acid  1 mg Oral Daily     magnesium oxide  400 mg Oral Daily     mirtazapine  30 mg Oral At Bedtime     multivitamin w/minerals  1 tablet Oral Daily     nicotine  1 patch Transdermal Daily     nicotine   Transdermal Q8H     pantoprazole  40 mg Oral BID      potassium & sodium phosphates  1 packet Oral TID     QUEtiapine  50 mg Oral At Bedtime     senna-docusate  1 tablet Oral BID     tamsulosin  0.8 mg Oral Daily     thiamine  100 mg Oral TID    Followed by     [START ON 3/18/2021] thiamine  100 mg Oral Daily     vortioxetine  10 mg Oral Daily

## 2021-03-15 ENCOUNTER — APPOINTMENT (OUTPATIENT)
Dept: ULTRASOUND IMAGING | Facility: CLINIC | Age: 66
DRG: 897 | End: 2021-03-15
Attending: PHYSICIAN ASSISTANT
Payer: MEDICARE

## 2021-03-15 ENCOUNTER — APPOINTMENT (OUTPATIENT)
Dept: CARDIOLOGY | Facility: CLINIC | Age: 66
DRG: 897 | End: 2021-03-15
Attending: HOSPITALIST
Payer: MEDICARE

## 2021-03-15 ENCOUNTER — APPOINTMENT (OUTPATIENT)
Dept: PHYSICAL THERAPY | Facility: CLINIC | Age: 66
DRG: 897 | End: 2021-03-15
Payer: MEDICARE

## 2021-03-15 LAB
ALBUMIN FLD-MCNC: 0.5 G/DL
ALBUMIN SERPL-MCNC: 2.6 G/DL (ref 3.4–5)
APPEARANCE FLD: NORMAL
BACTERIA SPEC CULT: NO GROWTH
COLOR FLD: YELLOW
COPATH REPORT: NORMAL
LYMPHOCYTES NFR FLD MANUAL: 27 %
MONOS+MACROS NFR FLD MANUAL: 20 %
NEUTS BAND NFR FLD MANUAL: 7 %
OTHER CELLS FLD MANUAL: 46 %
PROT FLD-MCNC: 1 G/DL
SPECIMEN SOURCE FLD: NORMAL
SPECIMEN SOURCE: NORMAL
WBC # FLD AUTO: 147 /UL

## 2021-03-15 PROCEDURE — 250N000011 HC RX IP 250 OP 636: Performed by: INTERNAL MEDICINE

## 2021-03-15 PROCEDURE — 49083 ABD PARACENTESIS W/IMAGING: CPT

## 2021-03-15 PROCEDURE — 99232 SBSQ HOSP IP/OBS MODERATE 35: CPT | Performed by: HOSPITALIST

## 2021-03-15 PROCEDURE — 250N000013 HC RX MED GY IP 250 OP 250 PS 637: Performed by: HOSPITALIST

## 2021-03-15 PROCEDURE — 36415 COLL VENOUS BLD VENIPUNCTURE: CPT | Performed by: PHYSICIAN ASSISTANT

## 2021-03-15 PROCEDURE — 120N000001 HC R&B MED SURG/OB

## 2021-03-15 PROCEDURE — 82042 OTHER SOURCE ALBUMIN QUAN EA: CPT | Performed by: PHYSICIAN ASSISTANT

## 2021-03-15 PROCEDURE — 0W9G3ZZ DRAINAGE OF PERITONEAL CAVITY, PERCUTANEOUS APPROACH: ICD-10-PCS | Performed by: RADIOLOGY

## 2021-03-15 PROCEDURE — 84157 ASSAY OF PROTEIN OTHER: CPT | Performed by: PHYSICIAN ASSISTANT

## 2021-03-15 PROCEDURE — 97116 GAIT TRAINING THERAPY: CPT | Mod: GP

## 2021-03-15 PROCEDURE — 250N000013 HC RX MED GY IP 250 OP 250 PS 637: Performed by: INTERNAL MEDICINE

## 2021-03-15 PROCEDURE — 82040 ASSAY OF SERUM ALBUMIN: CPT | Performed by: PHYSICIAN ASSISTANT

## 2021-03-15 PROCEDURE — 89051 BODY FLUID CELL COUNT: CPT | Performed by: PHYSICIAN ASSISTANT

## 2021-03-15 PROCEDURE — 93306 TTE W/DOPPLER COMPLETE: CPT | Mod: 26 | Performed by: INTERNAL MEDICINE

## 2021-03-15 PROCEDURE — 93306 TTE W/DOPPLER COMPLETE: CPT

## 2021-03-15 RX ORDER — ALBUMIN (HUMAN) 12.5 G/50ML
12.5 SOLUTION INTRAVENOUS ONCE
Status: DISCONTINUED | OUTPATIENT
Start: 2021-03-15 | End: 2021-03-17 | Stop reason: HOSPADM

## 2021-03-15 RX ORDER — LIDOCAINE HYDROCHLORIDE 10 MG/ML
10 INJECTION, SOLUTION EPIDURAL; INFILTRATION; INTRACAUDAL; PERINEURAL ONCE
Status: COMPLETED | OUTPATIENT
Start: 2021-03-15 | End: 2021-03-15

## 2021-03-15 RX ADMIN — ONDANSETRON 4 MG: 4 TABLET, ORALLY DISINTEGRATING ORAL at 09:42

## 2021-03-15 RX ADMIN — ACETAMINOPHEN 650 MG: 325 TABLET, FILM COATED ORAL at 22:27

## 2021-03-15 RX ADMIN — PANTOPRAZOLE SODIUM 40 MG: 40 TABLET, DELAYED RELEASE ORAL at 21:46

## 2021-03-15 RX ADMIN — Medication 400 MG: at 09:12

## 2021-03-15 RX ADMIN — POTASSIUM & SODIUM PHOSPHATES POWDER PACK 280-160-250 MG 1 PACKET: 280-160-250 PACK at 09:17

## 2021-03-15 RX ADMIN — FOLIC ACID 1 MG: 1 TABLET ORAL at 09:11

## 2021-03-15 RX ADMIN — ACETAMINOPHEN 650 MG: 325 TABLET, FILM COATED ORAL at 16:46

## 2021-03-15 RX ADMIN — PANTOPRAZOLE SODIUM 40 MG: 40 TABLET, DELAYED RELEASE ORAL at 09:12

## 2021-03-15 RX ADMIN — Medication 2 DROP: at 21:52

## 2021-03-15 RX ADMIN — THIAMINE HCL TAB 100 MG 100 MG: 100 TAB at 09:11

## 2021-03-15 RX ADMIN — VORTIOXETINE 10 MG: 10 TABLET, FILM COATED ORAL at 09:11

## 2021-03-15 RX ADMIN — QUETIAPINE 50 MG: 25 TABLET ORAL at 09:16

## 2021-03-15 RX ADMIN — THIAMINE HCL TAB 100 MG 100 MG: 100 TAB at 21:46

## 2021-03-15 RX ADMIN — LIDOCAINE HYDROCHLORIDE 10 ML: 10 INJECTION, SOLUTION EPIDURAL; INFILTRATION; INTRACAUDAL; PERINEURAL at 13:38

## 2021-03-15 RX ADMIN — MULTIPLE VITAMINS W/ MINERALS TAB 1 TABLET: TAB at 09:13

## 2021-03-15 RX ADMIN — QUETIAPINE 50 MG: 25 TABLET ORAL at 14:57

## 2021-03-15 RX ADMIN — THIAMINE HCL TAB 100 MG 100 MG: 100 TAB at 16:03

## 2021-03-15 RX ADMIN — NICOTINE 1 PATCH: 21 PATCH, EXTENDED RELEASE TRANSDERMAL at 09:18

## 2021-03-15 RX ADMIN — TAMSULOSIN HYDROCHLORIDE 0.8 MG: 0.4 CAPSULE ORAL at 09:13

## 2021-03-15 RX ADMIN — MIRTAZAPINE 30 MG: 15 TABLET, FILM COATED ORAL at 21:46

## 2021-03-15 RX ADMIN — QUETIAPINE 50 MG: 25 TABLET ORAL at 21:46

## 2021-03-15 ASSESSMENT — ACTIVITIES OF DAILY LIVING (ADL)
ADLS_ACUITY_SCORE: 13
ADLS_ACUITY_SCORE: 13
ADLS_ACUITY_SCORE: 14
ADLS_ACUITY_SCORE: 13
ADLS_ACUITY_SCORE: 13
ADLS_ACUITY_SCORE: 14

## 2021-03-15 NOTE — PROGRESS NOTES
3500cc of clear yellow fluid out.  No concerns/complications.  Patient to transfer back to station [id number].

## 2021-03-15 NOTE — PROGRESS NOTES
[facility]    Medicine Progress Note - Hospitalist Service       Date of Admission:  3/10/2021  Assessment & Plan          Jim Richard is a 66 year old male with alcohol dependence and cirrhosis secondary to alcohol who was admitted due to alcohol intoxication and weakness.    Acute compicate alcohol withdrawal with hallucinosis   Severe alcohol use disorder  Alcohol level on presentation was 0.34. The patient is on a commitment 11/2020 through 5/2021 through [facility].   Overnight on 3/11-3/12 he developed worsening tremulousness, hallucinations necessitating multiple dosages of ativan.  Last 3 CIWA scores= 1.2.1. No hallucinations and tremor better.    Continue CIWA protocol with lorazepam as needed    Quetiapine as needed     He is already on a CD commitment    Hypotension, acute  Resolved with intravenous fluid.  Transthoracic echocardiogram ok.    Elevated lipase- likely chemical pancreatitis from alcohol   Improving and asymptomatic- no further imaging at this time    Alcoholic cirrhosis  Chronic ascites  History of banding of esophageal varices in 2018  History of esophagitis, grade I esophageal varices, portal hypertensive gastropathy, and erythematous duodenopathy  Fecal occult blood positive  Albumin was 3.1, INR 1.3, TB 1.3, Na 129, Creatinine 2  He has paracenteses ~ every 2 weeks. Pt reports multiple falls in the last several weeks.   Status post paracentesis in the ED- no peritonitis. No abdominal pain. Fecal occult blood positive but the patient is not aware of blood in his stool or black stools.  Abdomen is very distended today.  Hepatitis C negative.  A IgG positive B surface Ag negative, Ab negative.    Getting paracentesis today per gastroenterology      Chronic anemia and thrombocytopenia  Pancytopenia  Hemoglobin   Date Value Ref Range Status   03/14/2021 8.3 (L) 13.3 - 17.7 g/dL Final   03/13/2021 8.6 (L) 13.3 - 17.7 g/dL Final     Platelet Count   Date  Value Ref Range Status   03/14/2021 76 (L) 150 - 450 10e9/L Final   Fecal occult blood positive;  iron, B12 and folate levels ok,  Smear pending. HIV negative.    Follow up smear    Monitor     Benign prostatic hypertrophy  History of urinary retention as well as noncompliance with medications.    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   Hyponatremia   Creatinine   Date Value Ref Range Status   03/14/2021 2.19 (H) 0.66 - 1.25 mg/dL Final     Sodium   Date Value Ref Range Status   03/14/2021 133 133 - 144 mmol/L Final     Monitor     Chronic obstructive lung disease   Tobacco use     Inhaled bronchodilators PRN    Continue nicotine      Depression  Anxiety    Continue quetiapine, vortioxetine, mirtazapine     Diet: 2 Gram Sodium Diet    DVT Prophylaxis: Pneumatic Compression Devices  Leger Catheter: not present  Code Status: Full Code      Disposition Plan   Expected discharge: 1-2 days when medically stable to transitional care unit.  Entered: Mauricio Ramírez MD 03/15/2021, 12:25 PM     The patient's care was discussed with the Patient.    Mauricio Ramírez MD  Hospitalist Service  [facility]  Contact information available via [facility] Paging/Directory    ______________________________________________________________________    Interval History   Felt dizzy getting up- blood pressure low.  No chest pain.      Data reviewed today: I reviewed all medications, new labs and imaging results over the last 24 hours. I personally reviewed no images or EKG's today.    Physical Exam   Vital Signs: Temp: 99.1  F (37.3  C) Temp src: Oral BP: 132/71 Pulse: 89   Resp: 18 SpO2: 98 % O2 Device: None (Room air)    Weight: 175 lbs 0 oz  Constitutional: awake, alert, cooperative, no apparent distress  Respiratory: No increased work  of breathing, good air exchange, clear to auscultation bilaterally, no crackles or wheezing  Cardiovascular: Regular rate, normal S1 and S2, no S3 or S4, and no murmur noted  GI:  Very distended today  Skin: no rashes and no jaundice  Neurologic: Awake, alert, oriented to name, place and time.  Cranial nerves II-XII are grossly intact.  Motor is 5 out of 5 bilaterally. No tremor.  Neuropsychiatric: General: normal, calm and normal eye contact    Data   Recent Labs   Lab 03/15/21  1035 21  0830 21  0946 21  1155 03/10/21  2252 03/10/21  0956   WBC  --  3.3* 2.7*  --  2.6* 2.4*   HGB  --  8.3* 8.6*  --  8.2* 8.2*   MCV  --  95 93  --  95 95   PLT  --  76* 61*  --  98* 100*   INR  --   --  1.33*  --   --  1.27*   NA  --  133 128* 129* 136 136   POTASSIUM  --  3.8 3.7 3.7 4.0 4.2   CHLORIDE  --  101 97 97 101 102   CO2  --  24 25 20 19* 19*   BUN  --  30 28 29 29 31*   CR  --  2.19* 2.10* 2.08* 1.73* 1.80*   ANIONGAP  --  8 6 12 16* 15*   KEV  --  8.0* 8.8 8.3* 7.9* 8.0*   GLC  --  148* 129* 168* 71 68*   ALBUMIN 2.6*  --  2.9*  --  3.1* 3.3*   PROTTOTAL  --   --  6.6*  --  6.6* 7.1   BILITOTAL  --   --  1.3  --  1.8* 1.4*   ALKPHOS  --   --  255*  --  269* 260*   ALT  --   --  37  --  39 38   AST  --   --  71*  --  89* 85*   LIPASE  --   --  490*  --  703*  --    TROPI  --   --   --   --  0.015 <0.015     Recent Results (from the past 24 hour(s))   Echocardiogram Complete    Narrative    [id number]  [id number]  [id number]  [id number]^HEIDY^CHINO^D           [facility]  Echocardiography Laboratory  [address] [place] [address]        Name: DEVONTE SEGURA  MRN: [id number]  : 1955  Study Date: 03/15/2021 08:30 AM  Age: 66 yrs  Gender: Male  Patient Location: [facility]  Reason For Study: RBBB  Ordering Physician: CHINO MOODY  Referring Physician: Talon Elias  Performed By: Tara Adkins     BSA: 1.9 m2  Height: 67 in  Weight: 173 lb  HR: 87  BP:  78/49 mmHg  _____________________________________________________________________________  __        Procedure  Complete Portable Echo Adult.  _____________________________________________________________________________  __        Interpretation Summary     1. The left ventricle is mildly dilated. The visual ejection fraction is  estimated at 52%.  2. The right ventricle is normal in structure, function and size.  3. No valve disease.  4. The ascending aorta is Mildly dilated. 4.2cm.     No previous echo for comparison.  _____________________________________________________________________________  __        Left Ventricle  The left ventricle is mildly dilated. There is normal left ventricular wall  thickness. The visual ejection fraction is estimated at 52%. Left ventricular  diastolic function is normal. Normal left ventricular wall motion.     Right Ventricle  The right ventricle is normal in structure, function and size.     Atria  Normal left atrial size. Right atrial size is normal. There is no atrial shunt  seen.     Mitral Valve  There is mild (1+) mitral regurgitation.        Tricuspid Valve  There is mild (1+) tricuspid regurgitation. The right ventricular systolic  pressure is approximated at 17mmHg plus the right atrial pressure.     Aortic Valve  The aortic valve is normal in structure and function.     Pulmonic Valve  The pulmonic valve is normal in structure and function.     Vessels  The ascending aorta is Mildly dilated. The inferior vena cava was normal in  size with preserved respiratory variability.     Pericardium  There is no pericardial effusion.        Rhythm  Sinus rhythm was noted.  _____________________________________________________________________________  __           Doppler Measurements & Calculations  TV V2 max: 207.6 cm/sec  TV max P.2 mmHg           _____________________________________________________________________________  __           Report approved by: Norberto  Kenton Orellana 03/15/2021 10:09 AM        Medications       folic acid  1 mg Oral Daily     magnesium oxide  400 mg Oral Daily     mirtazapine  30 mg Oral At Bedtime     multivitamin w/minerals  1 tablet Oral Daily     nicotine  1 patch Transdermal Daily     nicotine   Transdermal Q8H     pantoprazole  40 mg Oral BID     QUEtiapine  50 mg Oral At Bedtime     senna-docusate  1 tablet Oral BID     tamsulosin  0.8 mg Oral Daily     thiamine  100 mg Oral TID    Followed by     [START ON 3/18/2021] thiamine  100 mg Oral Daily     vortioxetine  10 mg Oral Daily

## 2021-03-15 NOTE — PROGRESS NOTES
[facility]  Gastroenterology Progress Note     Jim Richard MRN# [id number]   YOB: 1955 Age: 66 year old          Assessment and Plan:   Jim Richard is a 66 year old male admitted with alcohol withdrawal and decompensated liver cirrhosis with complaints of hallucinations. He was noted to have hemoccult positive stools, large ascites and minimally elevated lipase. Gi consulted on 3/13.    Alcoholic cirrhosis of liver with ascites (H)  Alcohol withdrawal (H)  Alcohol dependence with uncomplicated intoxication (H)  Pancytopenia (H)  Skin tear of right elbow without complication, initial encounter  Stage 3b chronic kidney disease  Patient has had stable hemoglobin in mid 8 range. Last paracentesis on 3/12. Last EGD 1/23 noting non bleeding esophagitis and grade I esophageal varices.  Albumin 2.9  -- No plans for endoscopy given stable hemoglobin  -- Continue to monitor daily labs (CMP and CBC)  -- pantoprazole 40 mg BID  -- 2 g sodium diet  -- Continue with current treatment plan  -- ultrasound guided paracentesis ordered for therapeutic reasons              Interval History:   denies chest pain, denies shortness of breath, alert, oriented to person, place and time, has had a bowel movement in the last 24 hours and c/o abdominal distention              Review of Systems:   C: NEGATIVE for fever, chills, change in weight  E/M: NEGATIVE for ear, mouth and throat problems  R: NEGATIVE for significant cough or SOB  CV: NEGATIVE for chest pain, palpitations or peripheral edema             Medications:   I have reviewed this patient's current medications    folic acid  1 mg Oral Daily     magnesium oxide  400 mg Oral Daily     mirtazapine  30 mg Oral At Bedtime     multivitamin w/minerals  1 tablet Oral Daily     nicotine  1 patch Transdermal Daily     nicotine   Transdermal Q8H     pantoprazole  40 mg Oral BID     potassium & sodium phosphates  1 packet Oral TID      QUEtiapine  50 mg Oral At Bedtime     senna-docusate  1 tablet Oral BID     tamsulosin  0.8 mg Oral Daily     thiamine  100 mg Oral TID    Followed by     [START ON 3/18/2021] thiamine  100 mg Oral Daily     vortioxetine  10 mg Oral Daily                  Physical Exam:   Vitals were reviewed  Vital Signs with Ranges  Temp:  [98.4  F (36.9  C)-99.4  F (37.4  C)] 99.4  F (37.4  C)  Pulse:  [79-95] 90  Resp:  [16-18] 18  BP: (108-139)/(67-77) 120/68  SpO2:  [95 %-99 %] 97 %  I/O last 3 completed shifts:  In: 740 [P.O.:740]  Out: -   Constitutional: healthy, alert and no distress   Cardiovascular: negative, PMI normal. No lifts, heaves, or thrills. RRR. No murmurs, clicks gallops or rub  Respiratory: negative, Percussion normal. Good diaphragmatic excursion. Lungs clear  Head: Normocephalic. No masses, lesions, tenderness or abnormalities  Neck: Neck supple. No adenopathy. Thyroid symmetric, normal size,, Carotids without bruits.  Abdomen: Abdomen firm, laterlly-tender. BS normal. No masses, organomegaly, positive findings: distended, tenderness mild generalized           Data:   I reviewed the patient's new clinical lab test results.   Recent Labs   Lab Test 03/14/21  0830 03/13/21  0946 03/10/21  2252 03/10/21  0956 02/12/21  0809   WBC 3.3* 2.7* 2.6* 2.4* 2.6*   HGB 8.3* 8.6* 8.2* 8.2* 7.7*   MCV 95 93 95 95 92   PLT 76* 61* 98* 100* 73*   INR  --  1.33*  --  1.27* 1.34*     Recent Labs   Lab Test 03/14/21  0830 03/13/21  0946 03/12/21  1155   POTASSIUM 3.8 3.7 3.7   CHLORIDE 101 97 97   CO2 24 25 20   BUN 30 28 29   ANIONGAP 8 6 12     Recent Labs   Lab Test 03/13/21  0946 03/11/21  0636 03/10/21  2252 03/10/21  0956 02/09/21  0223 02/09/21 0223 10/06/20  1610 10/06/20  1610 05/26/20  1612 05/26/20  1612   ALBUMIN 2.9*  --  3.1* 3.3*   < > 2.8*   < >  --    < >  --    BILITOTAL 1.3  --  1.8* 1.4*   < > 0.5   < >  --    < >  --    ALT 37  --  39 38   < > 45   < >  --    < >  --    AST 71*  --  89* 85*   < > 101*    < >  --    < >  --    PROTEIN  --  70*  --   --   --   --   --  10*  --  30*   LIPASE 490*  --  703*  --   --  799*   < >  --    < >  --     < > = values in this interval not displayed.       I reviewed the patient's new imaging results.    All laboratory data reviewed  All imaging studies reviewed by me.    Gloria De Leon PA-C,  3/15/2021  [facility]  Office : [phone]  Cell: [phone] (Dr. Shaw)  Cell: [phone] (Gloria De Leon PA-C)

## 2021-03-15 NOTE — PLAN OF CARE
Cognitive Concerns/ Orientation : A&Ox4, calm/cooperative.   BEHAVIOR & AGGRESSION TOOL COLOR: Green  CIWA SCORE: 2, 1, 2  ABNL VS/O2: VSS on RA  MOBILITY: Up with SBA, GB/walker; ambulates in muniz with staff   PAIN MANAGMENT: C/o abdominal pain PRN Tylenol; effective  DIET:  2g Na, good appetite   BOWEL/BLADDER: Continent; voiding adequately  ABNL LAB/BG:    DRAIN/DEVICES: PIV SL  TELEMETRY RHYTHM: n/a  SKIN: scattered bruises; large bruises on bilateral forearms; paracentesis site on R lower abd, bandage CDI.   TESTS/PROCEDURES: none  D/C DAY/GOALS/PLACE: pending, 2-3 days, SW involved.  OTHER IMPORTANT INFO: Abdomen rounded, distended, firm, c/o abdominal fullness. GI consulted for positive occult stool.

## 2021-03-15 NOTE — CONSULTS
[facility]  Gastroenterology Consultation         Jim Richard  [address] [facility] [address]  66 year old male    Admission Date/Time: 3/10/2021  Primary Care Provider: Talon Elias  Referring / Attending Physician: Mauricio Mac    We were asked to see the patient in consultation by Dr. Mac for evaluation of abdominal pain alcohol withdrawal.      CC: Alcohol withdrawal weakness hallucination    HPI:  Jim Richard is a 66 year old male who was admitted again with history of alcohol withdrawal patient has history of decompensated cirrhosis from alcohol use history of cirrhosis patient has been suffering from hallucination patient has history of severe alcohol withdrawal and alcohol dependence problem.  Patient has been hospitalized many times with the above-mentioned similar symptoms.  Patient has minimally elevated lipase otherwise most of the labs are fairly unchanged patient has moderate to large amount of ascites last paracentesis was about 2 days ago.  Patient has history of variceal bleed and banding in the past    ROS: A comprehensive ten point review of systems was negative aside from those in mentioned in the HPI.      PAST MED HX:  I have reviewed this patient's medical history and updated it with pertinent information if needed.   Past Medical History:   Diagnosis Date     Alcoholism (H) 1/14/2013    had treatment at [facility]      Coronary artery disease 09/09/2014    Nuclear stress test with slight fixed defect inferiorly, no ischemia     Depression     w/anxiety     Esophageal varices with bleeding 04/28/2018    6 varices banded on EGD     Heart attack (H)      Hepatomegaly     Fatty liver, chronic alcoholic     Hyperlipemia      Hypertension      JANIS on CPAP      Peripheral vascular disease (H)      PVD (peripheral vascular disease) (H)      SDH (subdural hematoma) (H) 04/26/2018    Right side, resolved  spontaneously     Spinal stenosis      Substance abuse (H)        MEDICATIONS:   Prior to Admission Medications   Prescriptions Last Dose Informant Patient Reported? Taking?   QUEtiapine (SEROQUEL) 100 MG tablet 3/9/2021  Yes Yes   Sig: Take 100 mg by mouth At Bedtime Filled 1/11/21 #30   QUEtiapine (SEROQUEL) 50 MG tablet prn  No Yes   Sig: Take 1 tablet (50 mg) by mouth 3 times daily as needed (anxiety) Filled 11/20/20 #30   acetaminophen (TYLENOL) 325 MG tablet prn  No Yes   Sig: Take 2 tablets (650 mg) by mouth every 4 hours as needed for mild pain   atenolol (TENORMIN) 25 MG tablet 3/10/2021 at am  Yes Yes   Sig: Take 25 mg by mouth daily Filled 9/9/20 #30   fluticasone-vilanterol (BREO ELLIPTA) 200-25 MCG/INH inhaler prn  No Yes   Sig: Inhale 1 puff into the lungs daily as needed (SOB)   folic acid (FOLVITE) 1 MG tablet 3/10/2021 at am  Yes Yes   Sig: Take 1 mg by mouth daily   hydrOXYzine (ATARAX) 50 MG tablet prn  No Yes   Sig: Take 1 tablet (50 mg) by mouth nightly as needed for other (sleep)   Patient taking differently: Take 50 mg by mouth nightly as needed for other (sleep) Filled 1/11/21 #90   magnesium oxide (MAG-OX) 400 (240 Mg) MG tablet 3/10/2021 at am  Yes Yes   Sig: Take 400 mg by mouth daily   mirtazapine (REMERON) 30 MG tablet 3/9/2021 at hs  Yes Yes   Sig: Take 30 mg by mouth At Bedtime Filled 1/11/21 for #30   multivitamin w/minerals (THERA-VIT-M) tablet 3/10/2021 at Unknown time  No Yes   Sig: Take 1 tablet by mouth daily   Patient taking differently: Take 1 tablet by mouth daily Filled 9/21/21 #30   nicotine (COMMIT) 2 MG lozenge prn  No Yes   Sig: Place 1 lozenge (2 mg) inside cheek every hour as needed for smoking cessation   Patient taking differently: Place 2 mg inside cheek every hour as needed for smoking cessation Filled 11/21/20 #30   nicotine (NICODERM CQ) 21 MG/24HR 24 hr patch Past Week at Unknown time  No Yes   Sig: Place 1 patch onto the skin daily   Patient taking differently:  Place 1 patch onto the skin daily Filled 11/20/21 #30   pantoprazole (PROTONIX) 40 MG EC tablet 3/10/2021 at Unknown time  No Yes   Sig: Take 1 tablet (40 mg) by mouth 2 times daily   tamsulosin (FLOMAX) 0.4 MG capsule 3/10/2021 at Unknown time  No Yes   Sig: Take 2 capsules (0.8 mg) by mouth daily   traZODone (DESYREL) 100 MG tablet 3/9/2021 at hs  Yes Yes   Sig: Take 100 mg by mouth At Bedtime Filled 1/11/21 #30   vortioxetine (TRINTELLIX) 10 MG tablet 3/10/2021 at am  No Yes   Sig: Take 1 tablet (10 mg) by mouth daily   Patient taking differently: Take 10 mg by mouth daily Filled 11/20/20 #30      Facility-Administered Medications: None       ALLERGIES:   Allergies   Allergen Reactions     Amlodipine Swelling     Lisinopril      Other reaction(s): Angioedema  Mouth and tongue swelling   Mouth and tongue swelling          SOCIAL HISTORY:  Social History     Tobacco Use     Smoking status: Current Every Day Smoker     Packs/day: 1.00     Types: Cigarettes     Smokeless tobacco: Never Used     Tobacco comment: Chantix caused nightmares   Substance Use Topics     Alcohol use: Yes     Comment: reports he drinks .75 of vodka every 2 days     Drug use: No       FAMILY HISTORY:  Family History   Problem Relation Age of Onset     Mental Illness Son      Coronary Artery Disease Early Onset Mother      Substance Abuse Father      Lung Cancer Father      Substance Abuse Brother      Unknown/Adopted No family hx of      Depression No family hx of      Anxiety Disorder No family hx of      Schizophrenia No family hx of      Bipolar Disorder No family hx of      Suicide No family hx of      Dementia No family hx of      [facility] Disease No family hx of      Parkinsonism No family hx of      Autism Spectrum Disorder No family hx of      Intellectual Disability (Mental Retardation) No family hx of        PHYSICAL EXAM:   General awake responding to verbal command appropriately complaining of abdominal pain  Vital Signs with  Ranges  Temp: 98.4  F (36.9  C) Temp src: Oral BP: 139/77 Pulse: 79   Resp: 16 SpO2: 99 % O2 Device: None (Room air)    I/O last 3 completed shifts:  In: 980 [P.O.:980]  Out: -     Constitutional: healthy, alert and no distress   Cardiovascular: negative, PMI normal. No lifts, heaves, or thrills. RRR. No murmurs, clicks gallops or rub  Respiratory: negative, Percussion normal. Good diaphragmatic excursion. Lungs clear  Neck: Neck supple. No adenopathy. Thyroid symmetric, normal size,, Carotids without bruits.  Abdomen: Abdomen soft, non-tender. BS normal. No masses, organomegaly  SKIN: no suspicious lesions or rashes          ADDITIONAL COMMENTS:   I reviewed the patient's new clinical lab test results.   Recent Labs   Lab Test 03/14/21 0830 03/13/21  0946 03/10/21  2252 03/10/21  0956 02/12/21  0809   WBC 3.3* 2.7* 2.6* 2.4* 2.6*   HGB 8.3* 8.6* 8.2* 8.2* 7.7*   MCV 95 93 95 95 92   PLT 76* 61* 98* 100* 73*   INR  --  1.33*  --  1.27* 1.34*     Recent Labs   Lab Test 03/14/21  0830 03/13/21  0946 03/12/21  1155   POTASSIUM 3.8 3.7 3.7   CHLORIDE 101 97 97   CO2 24 25 20   BUN 30 28 29   ANIONGAP 8 6 12     Recent Labs   Lab Test 03/13/21  0946 03/11/21  0636 03/10/21  2252 03/10/21  0956 02/09/21  0223 02/09/21  0223 10/06/20  1610 10/06/20  1610 05/26/20  1612 05/26/20  1612   ALBUMIN 2.9*  --  3.1* 3.3*   < > 2.8*   < >  --    < >  --    BILITOTAL 1.3  --  1.8* 1.4*   < > 0.5   < >  --    < >  --    ALT 37  --  39 38   < > 45   < >  --    < >  --    AST 71*  --  89* 85*   < > 101*   < >  --    < >  --    PROTEIN  --  70*  --   --   --   --   --  10*  --  30*   LIPASE 490*  --  703*  --   --  799*   < >  --    < >  --     < > = values in this interval not displayed.       I reviewed the patient's new imaging results.        CONSULTATION ASSESSMENT AND PLAN:    Active Problems:    Alcoholic cirrhosis of liver with ascites (H)    Assessment:   66-year-old gentleman with history of chronic heavy alcohol consumption  well-known to GI service known history of decompensated cirrhosis with ascites history of GI bleed malnutrition hepatic encephalopathy coagulopathy.  Patient is now admitted again with similar symptoms.  Clinically patient is doing reasonably well patient is hemodynamically stable.  Patient had paracentesis patient has history of thrombocytopenia no acute toxicity or sepsis findings.  At this point I will recommend to continue on supportive care continue on 2 g sodium diet.  GI will continue to follow along.            Rosendo Shaw MD, FACP  [facility].  Office: [phone]  Cell : [phone]

## 2021-03-15 NOTE — PLAN OF CARE
DATE & TIME: 3/14/2021 0767-8435  Cognitive Concerns/ Orientation : A&Ox4, calm/cooperative.   BEHAVIOR & AGGRESSION TOOL COLOR: Green  CIWA SCORE: 3, 2, 3  ABNL VS/O2: VSS on RA ex low BP, 78/49, improved after bolus  MOBILITY: Up with SBA, GB/walker; ambulates in muniz with staff   PAIN MANAGMENT: C/o L abd pain, tylenol given x1 with improvement  DIET: Diet changed to 2g Na, good appetite   BOWEL/BLADDER: Continent; voiding adequately, BM x1  ABNL LAB/BG:  Creat 2.19  DRAIN/DEVICES: PIV SL  TELEMETRY RHYTHM: n/a  SKIN: scattered bruises; large bruises on bilateral forearms; paracentesis site on R lower abd, bandage CDI.   TESTS/PROCEDURES: none  D/C DAY/GOALS/PLACE: pending, 2-3 days, SW involved.  OTHER IMPORTANT INFO: Abd distended, firm. GI consulted for positive occult stool.

## 2021-03-15 NOTE — PLAN OF CARE
DATE & TIME: 3/15/2021 days     Cognitive Concerns/ Orientation :  alert and oriented x 4    BEHAVIOR & AGGRESSION TOOL COLOR:  green   CIWA SCORE:  1 1    ABNL VS/O2:  VSS RA   MOBILITY:  up with sba and gaitbelt  PAIN MANAGMENT:  complained of discomfort not pain and moving positions helped   DIET:  Regular   BOWEL/BLADDER:  continent   ABNL LAB/BG:    DRAIN/DEVICES: none   TELEMETRY RHYTHM:  na   SKIN:  bruised arms and fragile skin   TESTS/PROCEDURES:  had an US guided paracentesis today   D/C DAY/GOALS/PLACE:  uncertain   OTHER IMPORTANT INFO:  pt up in the chair for meals and tolerated well, pt down for paracentesis and 3,500ml removed.  Pt ambulated in the halls with PT and tolerated well. VSS RA.

## 2021-03-16 LAB
ALBUMIN SERPL-MCNC: 2.7 G/DL (ref 3.4–5)
ALP SERPL-CCNC: 265 U/L (ref 40–150)
ALT SERPL W P-5'-P-CCNC: 38 U/L (ref 0–70)
ANION GAP SERPL CALCULATED.3IONS-SCNC: 7 MMOL/L (ref 3–14)
ANISOCYTOSIS BLD QL SMEAR: ABNORMAL
AST SERPL W P-5'-P-CCNC: 60 U/L (ref 0–45)
BASOPHILS # BLD AUTO: 0 10E9/L (ref 0–0.2)
BASOPHILS NFR BLD AUTO: 0.9 %
BILIRUB SERPL-MCNC: 0.9 MG/DL (ref 0.2–1.3)
BUN SERPL-MCNC: 28 MG/DL (ref 7–30)
CALCIUM SERPL-MCNC: 8.1 MG/DL (ref 8.5–10.1)
CHLORIDE SERPL-SCNC: 102 MMOL/L (ref 94–109)
CO2 SERPL-SCNC: 23 MMOL/L (ref 20–32)
CREAT SERPL-MCNC: 2.24 MG/DL (ref 0.66–1.25)
DIFFERENTIAL METHOD BLD: ABNORMAL
EOSINOPHIL # BLD AUTO: 0 10E9/L (ref 0–0.7)
EOSINOPHIL NFR BLD AUTO: 0.5 %
ERYTHROCYTE [DISTWIDTH] IN BLOOD BY AUTOMATED COUNT: 21.2 % (ref 10–15)
GFR SERPL CREATININE-BSD FRML MDRD: 29 ML/MIN/{1.73_M2}
GLUCOSE SERPL-MCNC: 112 MG/DL (ref 70–99)
HCT VFR BLD AUTO: 25.3 % (ref 40–53)
HGB BLD-MCNC: 8.4 G/DL (ref 13.3–17.7)
IMM GRANULOCYTES # BLD: 0 10E9/L (ref 0–0.4)
IMM GRANULOCYTES NFR BLD: 0.5 %
LYMPHOCYTES # BLD AUTO: 0.4 10E9/L (ref 0.8–5.3)
LYMPHOCYTES NFR BLD AUTO: 16.4 %
MCH RBC QN AUTO: 31.9 PG (ref 26.5–33)
MCHC RBC AUTO-ENTMCNC: 33.2 G/DL (ref 31.5–36.5)
MCV RBC AUTO: 96 FL (ref 78–100)
MONOCYTES # BLD AUTO: 0.4 10E9/L (ref 0–1.3)
MONOCYTES NFR BLD AUTO: 19.2 %
NEUTROPHILS # BLD AUTO: 1.4 10E9/L (ref 1.6–8.3)
NEUTROPHILS NFR BLD AUTO: 62.5 %
NRBC # BLD AUTO: 0 10*3/UL
NRBC BLD AUTO-RTO: 0 /100
OVALOCYTES BLD QL SMEAR: SLIGHT
PLATELET # BLD AUTO: 98 10E9/L (ref 150–450)
PLATELET # BLD EST: ABNORMAL 10*3/UL
POTASSIUM SERPL-SCNC: 3.7 MMOL/L (ref 3.4–5.3)
PROT SERPL-MCNC: 6.4 G/DL (ref 6.8–8.8)
RBC # BLD AUTO: 2.63 10E12/L (ref 4.4–5.9)
RBC INCLUSIONS BLD: SLIGHT
SODIUM SERPL-SCNC: 132 MMOL/L (ref 133–144)
WBC # BLD AUTO: 2.2 10E9/L (ref 4–11)

## 2021-03-16 PROCEDURE — 250N000013 HC RX MED GY IP 250 OP 250 PS 637: Performed by: INTERNAL MEDICINE

## 2021-03-16 PROCEDURE — 36415 COLL VENOUS BLD VENIPUNCTURE: CPT | Performed by: HOSPITALIST

## 2021-03-16 PROCEDURE — 99232 SBSQ HOSP IP/OBS MODERATE 35: CPT | Performed by: HOSPITALIST

## 2021-03-16 PROCEDURE — 120N000001 HC R&B MED SURG/OB

## 2021-03-16 PROCEDURE — 250N000013 HC RX MED GY IP 250 OP 250 PS 637: Performed by: HOSPITALIST

## 2021-03-16 PROCEDURE — 80053 COMPREHEN METABOLIC PANEL: CPT | Performed by: HOSPITALIST

## 2021-03-16 PROCEDURE — 85025 COMPLETE CBC W/AUTO DIFF WBC: CPT | Performed by: HOSPITALIST

## 2021-03-16 RX ADMIN — QUETIAPINE 50 MG: 25 TABLET ORAL at 05:29

## 2021-03-16 RX ADMIN — Medication 2 DROP: at 21:48

## 2021-03-16 RX ADMIN — MIRTAZAPINE 30 MG: 15 TABLET, FILM COATED ORAL at 21:48

## 2021-03-16 RX ADMIN — Medication 2 DROP: at 12:28

## 2021-03-16 RX ADMIN — Medication 2 DROP: at 10:17

## 2021-03-16 RX ADMIN — Medication 2 DROP: at 17:07

## 2021-03-16 RX ADMIN — QUETIAPINE 50 MG: 25 TABLET ORAL at 21:47

## 2021-03-16 RX ADMIN — QUETIAPINE 50 MG: 25 TABLET ORAL at 12:00

## 2021-03-16 RX ADMIN — THIAMINE HCL TAB 100 MG 100 MG: 100 TAB at 21:47

## 2021-03-16 RX ADMIN — PANTOPRAZOLE SODIUM 40 MG: 40 TABLET, DELAYED RELEASE ORAL at 21:48

## 2021-03-16 RX ADMIN — MULTIPLE VITAMINS W/ MINERALS TAB 1 TABLET: TAB at 10:03

## 2021-03-16 RX ADMIN — HYDROXYZINE HYDROCHLORIDE 50 MG: 25 TABLET, FILM COATED ORAL at 21:47

## 2021-03-16 RX ADMIN — NICOTINE 1 PATCH: 21 PATCH, EXTENDED RELEASE TRANSDERMAL at 10:03

## 2021-03-16 RX ADMIN — VORTIOXETINE 10 MG: 10 TABLET, FILM COATED ORAL at 10:03

## 2021-03-16 RX ADMIN — Medication 400 MG: at 10:03

## 2021-03-16 RX ADMIN — Medication 2 DROP: at 05:24

## 2021-03-16 RX ADMIN — THIAMINE HCL TAB 100 MG 100 MG: 100 TAB at 17:07

## 2021-03-16 RX ADMIN — PANTOPRAZOLE SODIUM 40 MG: 40 TABLET, DELAYED RELEASE ORAL at 10:03

## 2021-03-16 RX ADMIN — MELATONIN TAB 3 MG 5 MG: 3 TAB at 22:27

## 2021-03-16 RX ADMIN — Medication 2 DROP: at 00:31

## 2021-03-16 RX ADMIN — QUETIAPINE 50 MG: 25 TABLET ORAL at 18:48

## 2021-03-16 RX ADMIN — FOLIC ACID 1 MG: 1 TABLET ORAL at 10:03

## 2021-03-16 RX ADMIN — TAMSULOSIN HYDROCHLORIDE 0.8 MG: 0.4 CAPSULE ORAL at 10:03

## 2021-03-16 RX ADMIN — THIAMINE HCL TAB 100 MG 100 MG: 100 TAB at 10:03

## 2021-03-16 ASSESSMENT — ACTIVITIES OF DAILY LIVING (ADL)
ADLS_ACUITY_SCORE: 13
ADLS_ACUITY_SCORE: 11

## 2021-03-16 NOTE — PROGRESS NOTES
[facility]    Medicine Progress Note - Hospitalist Service       Date of Admission:  3/10/2021  Assessment & Plan        Jim Richard is a 66 year old male with alcohol dependence and cirrhosis secondary to alcohol who was admitted due to alcohol intoxication and weakness.    Acute compicate alcohol withdrawal with hallucinosis   Severe alcohol use disorder  Alcohol level on presentation was 0.34. The patient is on a commitment 11/2020 through 5/2021 through [facility].   Overnight on 3/11-3/12 he developed worsening tremulousness, hallucinations necessitating multiple dosages of ativan.  Last 3 CIWA scores= 2,1,1. No hallucinations and tremor better.    Continue CIWA protocol with lorazepam as needed    Quetiapine as needed     He is already on a CD commitment    Hypotension, acute  Resolved with intravenous fluid.  Transthoracic echocardiogram ok.    Elevated lipase- likely chemical pancreatitis from alcohol   Improving and asymptomatic- no further imaging at this time    Alcoholic cirrhosis  Chronic ascites  History of banding of esophageal varices in 2018  History of esophagitis, grade I esophageal varices, portal hypertensive gastropathy, and erythematous duodenopathy  Fecal occult blood positive  Albumin was 3.1, INR 1.3, TB 1.3, Na 129, Creatinine 2  He has paracenteses ~ every 2 weeks. Pt reports multiple falls in the last several weeks.   Status post paracentesis in the ED- no peritonitis. No abdominal pain. Fecal occult blood positive but the patient is not aware of blood in his stool or black stools.  Abdomen is very distended today.  Hepatitis C negative.  A IgG positive B surface Ag negative, Ab negative.  Status post paracentesis on 3/15 with 3.5L fluid removed.  He feels much less distended today.    Monitor      Chronic anemia and thrombocytopenia  Pancytopenia  Hemoglobin   Date Value Ref Range Status   03/16/2021 8.4 (L) 13.3 - 17.7 g/dL Final   03/14/2021 8.3  (L) 13.3 - 17.7 g/dL Final     Platelet Count   Date Value Ref Range Status   03/16/2021 98 (L) 150 - 450 10e9/L Final   Fecal occult blood positive;  iron, B12 and folate levels ok,  Smear pending. HIV negative.  Smear consistent with cirrhosis, chronic kidney disease     Monitor     Benign prostatic hypertrophy  History of urinary retention as well as noncompliance with medications.    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   Hyponatremia   Creatinine   Date Value Ref Range Status   03/16/2021 2.24 (H) 0.66 - 1.25 mg/dL Final     Sodium   Date Value Ref Range Status   03/16/2021 132 (L) 133 - 144 mmol/L Final     Monitor     Chronic obstructive lung disease   Tobacco use     Inhaled bronchodilators PRN    Continue nicotine      Depression  Anxiety    Continue quetiapine, vortioxetine, mirtazapine     Diet: Advance Diet as Tolerated: Regular Diet Adult; 2 gm NA Diet    DVT Prophylaxis: Pneumatic Compression Devices  Leger Catheter: not present  Code Status: Full Code      Disposition Plan   Expected discharge: can discharge to a transitional care unit when a bed is available.  Entered: Mauricio Ramírez MD 03/16/2021, 2:02 PM     The patient's care was discussed with the Patient.    Mauricio Ramírez MD  Hospitalist Service  [facility]  Contact information available via [facility] Paging/Directory    ______________________________________________________________________    Interval History    Feels well today- not dizzy.  He thinks that he is more steady on his feet.  Abdomen much less distended.     Data reviewed today: I reviewed all medications, new labs and imaging results over the last 24 hours. I personally reviewed no images or EKG's today.    Physical Exam   Vital Signs: Temp: 98.9  F (37.2  C) Temp src: Oral BP: 103/62 Pulse: 86   Resp: 18 SpO2: 98 % O2 Device: None (Room air)    Weight: 172 lbs 12.8  oz  Constitutional: awake, alert, cooperative, no apparent distress  Respiratory: No increased work of breathing, good air exchange, clear to auscultation bilaterally, no crackles or wheezing  Cardiovascular: Regular rate, normal S1 and S2, no S3 or S4, and no murmur noted  GI:  less distended today  Skin: no rashes and no jaundice  Neurologic: Awake, alert, oriented to name, place and time.  Cranial nerves II-XII are grossly intact.  Motor is 5 out of 5 bilaterally. No tremor.  Neuropsychiatric: General: normal, calm and normal eye contact    Data   Recent Labs   Lab 03/16/21  0825 03/15/21  1035 03/14/21  0830 03/13/21  0946 03/10/21  2252 03/10/21  2252 03/10/21  0956   WBC 2.2*  --  3.3* 2.7*  --  2.6* 2.4*   HGB 8.4*  --  8.3* 8.6*  --  8.2* 8.2*   MCV 96  --  95 93  --  95 95   PLT 98*  --  76* 61*  --  98* 100*   INR  --   --   --  1.33*  --   --  1.27*   *  --  133 128*   < > 136 136   POTASSIUM 3.7  --  3.8 3.7   < > 4.0 4.2   CHLORIDE 102  --  101 97   < > 101 102   CO2 23  --  24 25   < > 19* 19*   BUN 28  --  30 28   < > 29 31*   CR 2.24*  --  2.19* 2.10*   < > 1.73* 1.80*   ANIONGAP 7  --  8 6   < > 16* 15*   KEV 8.1*  --  8.0* 8.8   < > 7.9* 8.0*   *  --  148* 129*   < > 71 68*   ALBUMIN 2.7* 2.6*  --  2.9*  --  3.1* 3.3*   PROTTOTAL 6.4*  --   --  6.6*  --  6.6* 7.1   BILITOTAL 0.9  --   --  1.3  --  1.8* 1.4*   ALKPHOS 265*  --   --  255*  --  269* 260*   ALT 38  --   --  37  --  39 38   AST 60*  --   --  71*  --  89* 85*   LIPASE  --   --   --  490*  --  703*  --    TROPI  --   --   --   --   --  0.015 <0.015    < > = values in this interval not displayed.     Recent Results (from the past 24 hour(s))   US Paracentesis    Narrative    US PARACENTESIS 3/15/2021 2:17 PM    CLINICAL HISTORY: HIGH VOLUME paracentesis with or without diagnostic  fluid analysis with labs to be drawn if ordered. Total paracentesis  volume as much as possible.    PROCEDURE: Informed consent obtained. Time out  performed. The abdomen  was prepped and draped in a sterile fashion. 10 mL of 1% lidocaine was  infused into local soft tissues. A 5 [place] catheter system was  introduced into the abdominal ascites under ultrasound guidance.    3.5 liters of clear fluid were removed and sent to lab if requested.    Patient tolerated procedure well.    Ultrasound imaging was obtained and placed in the patient's permanent  medical record.      Impression    IMPRESSION:  1.  Status post ultrasound-guided paracentesis.    LESTER J FAHRNER, MD     Medications     - MEDICATION INSTRUCTIONS -         albumin human  12.5 g Intravenous Once     folic acid  1 mg Oral Daily     lidocaine (buffered or not buffered)  5 mL Intradermal Once     magnesium oxide  400 mg Oral Daily     mirtazapine  30 mg Oral At Bedtime     multivitamin w/minerals  1 tablet Oral Daily     nicotine  1 patch Transdermal Daily     nicotine   Transdermal Q8H     pantoprazole  40 mg Oral BID     QUEtiapine  50 mg Oral At Bedtime     senna-docusate  1 tablet Oral BID     tamsulosin  0.8 mg Oral Daily     thiamine  100 mg Oral TID    Followed by     [START ON 3/18/2021] thiamine  100 mg Oral Daily     vortioxetine  10 mg Oral Daily

## 2021-03-16 NOTE — PLAN OF CARE
DATE & TIME: 3/15/2021 3-11pm  Cognitive Concerns/ Orientation : A&Ox4, calm/cooperative.   BEHAVIOR & AGGRESSION TOOL COLOR: Green  CIWA SCORE: 1 1  ABNL VS/O2: VSS on RA  MOBILITY: Up with 1 with GB/walker. Ambulated at the hallway x1.  PAIN MANAGMENT: C/o left lateral ribs pain relief with tylenol.  DIET:  2g Na, good appetite   BOWEL/BLADDER: Continent; voiding adequately  ABNL LAB/BG:  Albumin 2.6  DRAIN/DEVICES: PIV SL  TELEMETRY RHYTHM: n/a  SKIN: scattered bruises; large bruises on bilateral forearms; paracentesis site on R lower abd, bandage CDI.   TESTS/PROCEDURES: Had paracentesis today with 3.5L clear liquid removed.  D/C DAY/GOALS/PLACE: pending, 2-3 days, SW involved.  OTHER IMPORTANT INFO: Abdomen rounded, distended, soft. Patient verbalized that his abdomen feels much better with paracentesis done. GI following.

## 2021-03-16 NOTE — PLAN OF CARE
Cognitive Concerns/ Orientation : A&Ox4, calm/cooperative.   BEHAVIOR & AGGRESSION TOOL COLOR: Green  CIWA SCORE: 1, 2 (for tremor)  ABNL VS/O2: VSS on RA  MOBILITY: Up with 1 with GB/walker.   PAIN MANAGMENT: C/o left lateral ribs pain, declined intervention.   DIET:  2g Na, tolerating   BOWEL/BLADDER: Continent; voiding in urinal   ABNL LAB/BG:  Albumin 2.6  DRAIN/DEVICES: PIV, saline locked   TELEMETRY RHYTHM: n/a  SKIN: scattered bruises; large bruises on bilateral forearms. Paracentesis site on R lower abd, WDL, bandage CDI.   TESTS/PROCEDURES: Paracentesis yesterday   D/C DAY/GOALS/PLACE: pending, 2-3 days, SW following.   OTHER IMPORTANT INFO: Abdomen rounded, distended, soft, improved after paracentesis per pt. GI following. C/O dry eyes, PRN eye drops given x2. PRN Seroquel x1.

## 2021-03-16 NOTE — PLAN OF CARE
DATE & TIME: 3/16/2021 7197-4733  Cognitive Concerns/ Orientation : A&Ox4, calm/cooperative.  BEHAVIOR & AGGRESSION TOOL COLOR: Green  CIWA SCORE: 1, 1 (tremor)  ABNL VS/O2: VSS on RA  MOBILITY: Up with 1 with GB/walker.   PAIN MANAGMENT: denies pain this shift.  DIET:  2g Na, tolerating   BOWEL/BLADDER: Continent; voiding in urinal   ABNL LAB/BG:  Albumin 2.7, Na 132, Creat 2.24, Alkaline Phosphatase 265, AST 60, WBC 2.2.  DRAIN/DEVICES: PIV, saline locked   TELEMETRY RHYTHM: n/a  SKIN: scattered bruises; large bruises on bilateral forearms. Paracentesis site on R lower abd, WDL, bandage CDI.   TESTS/PROCEDURES: Paracentesis 3/15, removed 3.5 L fluid.  D/C DAY/GOALS/PLACE: pending TCU placement, SW following.  OTHER IMPORTANT INFO: Abdomen rounded, distended, soft, improved after paracentesis per pt. GI following. C/O dry eyes, PRN eye drops given x1. Anxious at times this shift about wanting to leave, PRN seroquel given x1 per pt request, helpful.

## 2021-03-16 NOTE — PROGRESS NOTES
[facility]  Gastroenterology Progress Note     Jim Richard MRN# [id number]   YOB: 1955 Age: 66 year old          Assessment and Plan:   Jim Richard is a 66 year old male admitted with alcohol withdrawal and decompensated liver cirrhosis with complaints of hallucinations. He was noted to have hemoccult positive stools, large ascites and minimally elevated lipase.      Alcoholic cirrhosis of liver with ascites (H)  Alcohol withdrawal (H)  Alcohol dependence with uncomplicated intoxication (H)  Pancytopenia (H)  Skin tear of right elbow without complication, initial encounter  Stage 3b chronic kidney disease  Patient has had stable hemoglobin in mid 8 range. Last paracentesis on 3/12. Last EGD 1/23 noting non bleeding esophagitis and grade I esophageal varices.  Albumin 2.7  -- No plans for endoscopy given stable hemoglobin  -- Continue to monitor daily labs (CMP and CBC)  -- pantoprazole 40 mg BID  -- 2 g sodium diet  -- Continue with current treatment plan  -- ultrasound guided paracentesis ordered for therapeutic reasons- removed 3.5 L on 3/15           Interval History:   no new complaints, doing well, denies chest pain, denies shortness of breath, denies abdominal pain, alert, oriented to person, place and time, has had a bowel movement in the last 24 hours and doing well; no cp, sob, n/v/d, or abd pain.              Review of Systems:   C: NEGATIVE for fever, chills, change in weight  E/M: NEGATIVE for ear, mouth and throat problems  R: NEGATIVE for significant cough or SOB  CV: NEGATIVE for chest pain, palpitations or peripheral edema             Medications:   I have reviewed this patient's current medications    albumin human  12.5 g Intravenous Once     folic acid  1 mg Oral Daily     lidocaine (buffered or not buffered)  5 mL Intradermal Once     magnesium oxide  400 mg Oral Daily     mirtazapine  30 mg Oral At Bedtime     multivitamin w/minerals  1 tablet  Oral Daily     nicotine  1 patch Transdermal Daily     nicotine   Transdermal Q8H     pantoprazole  40 mg Oral BID     QUEtiapine  50 mg Oral At Bedtime     senna-docusate  1 tablet Oral BID     tamsulosin  0.8 mg Oral Daily     thiamine  100 mg Oral TID    Followed by     [START ON 3/18/2021] thiamine  100 mg Oral Daily     vortioxetine  10 mg Oral Daily                  Physical Exam:   Vitals were reviewed  Vital Signs with Ranges  Temp:  [99  F (37.2  C)-99.6  F (37.6  C)] 99  F (37.2  C)  Pulse:  [79-95] 95  Resp:  [16-18] 18  BP: (115-148)/(64-73) 115/64  SpO2:  [97 %-98 %] 98 %  I/O last 3 completed shifts:  In: 1130 [P.O.:1130]  Out: -   Constitutional: healthy, alert and no distress   Cardiovascular: negative, PMI normal. No lifts, heaves, or thrills. RRR. No murmurs, clicks gallops or rub  Respiratory: negative, Percussion normal. Good diaphragmatic excursion. Lungs clear  Head: Normocephalic. No masses, lesions, tenderness or abnormalities  Neck: Neck supple. No adenopathy. Thyroid symmetric, normal size,, Carotids without bruits.  Abdomen: Abdomen soft, non-tender. BS normal. No masses, organomegaly           Data:   I reviewed the patient's new clinical lab test results.   Recent Labs   Lab Test 03/16/21  0825 03/14/21  0830 03/13/21  0946 03/10/21  0956 03/10/21  0956 02/12/21  0809   WBC 2.2* 3.3* 2.7*   < > 2.4* 2.6*   HGB 8.4* 8.3* 8.6*   < > 8.2* 7.7*   MCV 96 95 93   < > 95 92   PLT 98* 76* 61*   < > 100* 73*   INR  --   --  1.33*  --  1.27* 1.34*    < > = values in this interval not displayed.     Recent Labs   Lab Test 03/16/21  0825 03/14/21  0830 03/13/21  0946   POTASSIUM 3.7 3.8 3.7   CHLORIDE 102 101 97   CO2 23 24 25   BUN 28 30 28   ANIONGAP 7 8 6     Recent Labs   Lab Test 03/16/21  0825 03/15/21  1035 03/13/21  0946 03/11/21  0636 03/10/21  2252 02/09/21  0223 02/09/21  0223 10/06/20  1610 10/06/20  1610 05/26/20  1612 05/26/20  1612   ALBUMIN 2.7* 2.6* 2.9*  --  3.1*   < > 2.8*   < >   --    < >  --    BILITOTAL 0.9  --  1.3  --  1.8*   < > 0.5   < >  --    < >  --    ALT 38  --  37  --  39   < > 45   < >  --    < >  --    AST 60*  --  71*  --  89*   < > 101*   < >  --    < >  --    PROTEIN  --   --   --  70*  --   --   --   --  10*  --  30*   LIPASE  --   --  490*  --  703*  --  799*   < >  --    < >  --     < > = values in this interval not displayed.       I reviewed the patient's new imaging results.    All laboratory data reviewed  All imaging studies reviewed by me.    Gloria De Leon PA-C,  3/16/2021  [facility]  Office : [phone]  Cell: [phone] (Dr. Shaw)  Cell: [phone] (Gloria De Leon PA-C)

## 2021-03-17 ENCOUNTER — HOSPITAL ENCOUNTER (OUTPATIENT)
Facility: CLINIC | Age: 66
End: 2021-03-17
Payer: MEDICARE

## 2021-03-17 ENCOUNTER — APPOINTMENT (OUTPATIENT)
Dept: PHYSICAL THERAPY | Facility: CLINIC | Age: 66
DRG: 897 | End: 2021-03-17
Payer: MEDICARE

## 2021-03-17 VITALS
DIASTOLIC BLOOD PRESSURE: 83 MMHG | WEIGHT: 172.8 LBS | HEART RATE: 95 BPM | TEMPERATURE: 98.3 F | OXYGEN SATURATION: 99 % | BODY MASS INDEX: 27.06 KG/M2 | SYSTOLIC BLOOD PRESSURE: 149 MMHG | RESPIRATION RATE: 18 BRPM

## 2021-03-17 LAB
ANION GAP SERPL CALCULATED.3IONS-SCNC: 7 MMOL/L (ref 3–14)
BUN SERPL-MCNC: 28 MG/DL (ref 7–30)
CALCIUM SERPL-MCNC: 8.3 MG/DL (ref 8.5–10.1)
CHLORIDE SERPL-SCNC: 105 MMOL/L (ref 94–109)
CO2 SERPL-SCNC: 22 MMOL/L (ref 20–32)
CREAT SERPL-MCNC: 2.16 MG/DL (ref 0.66–1.25)
GFR SERPL CREATININE-BSD FRML MDRD: 31 ML/MIN/{1.73_M2}
GLUCOSE SERPL-MCNC: 97 MG/DL (ref 70–99)
POTASSIUM SERPL-SCNC: 3.7 MMOL/L (ref 3.4–5.3)
SODIUM SERPL-SCNC: 134 MMOL/L (ref 133–144)

## 2021-03-17 PROCEDURE — 80048 BASIC METABOLIC PNL TOTAL CA: CPT | Performed by: HOSPITALIST

## 2021-03-17 PROCEDURE — 97116 GAIT TRAINING THERAPY: CPT | Mod: GP | Performed by: PHYSICAL THERAPY ASSISTANT

## 2021-03-17 PROCEDURE — 99238 HOSP IP/OBS DSCHRG MGMT 30/<: CPT | Performed by: HOSPITALIST

## 2021-03-17 PROCEDURE — 36415 COLL VENOUS BLD VENIPUNCTURE: CPT | Performed by: HOSPITALIST

## 2021-03-17 PROCEDURE — 250N000013 HC RX MED GY IP 250 OP 250 PS 637: Performed by: HOSPITALIST

## 2021-03-17 PROCEDURE — 250N000013 HC RX MED GY IP 250 OP 250 PS 637: Performed by: INTERNAL MEDICINE

## 2021-03-17 PROCEDURE — 97110 THERAPEUTIC EXERCISES: CPT | Mod: GP | Performed by: PHYSICAL THERAPY ASSISTANT

## 2021-03-17 RX ADMIN — MULTIPLE VITAMINS W/ MINERALS TAB 1 TABLET: TAB at 09:13

## 2021-03-17 RX ADMIN — PANTOPRAZOLE SODIUM 40 MG: 40 TABLET, DELAYED RELEASE ORAL at 09:13

## 2021-03-17 RX ADMIN — THIAMINE HCL TAB 100 MG 100 MG: 100 TAB at 16:33

## 2021-03-17 RX ADMIN — QUETIAPINE 50 MG: 25 TABLET ORAL at 11:28

## 2021-03-17 RX ADMIN — SENNOSIDES AND DOCUSATE SODIUM 1 TABLET: 8.6; 5 TABLET ORAL at 09:13

## 2021-03-17 RX ADMIN — NICOTINE 1 PATCH: 21 PATCH, EXTENDED RELEASE TRANSDERMAL at 09:15

## 2021-03-17 RX ADMIN — TAMSULOSIN HYDROCHLORIDE 0.8 MG: 0.4 CAPSULE ORAL at 09:13

## 2021-03-17 RX ADMIN — FOLIC ACID 1 MG: 1 TABLET ORAL at 09:14

## 2021-03-17 RX ADMIN — Medication 400 MG: at 09:14

## 2021-03-17 RX ADMIN — THIAMINE HCL TAB 100 MG 100 MG: 100 TAB at 09:13

## 2021-03-17 RX ADMIN — Medication 2 DROP: at 01:40

## 2021-03-17 RX ADMIN — VORTIOXETINE 10 MG: 10 TABLET, FILM COATED ORAL at 09:13

## 2021-03-17 ASSESSMENT — ACTIVITIES OF DAILY LIVING (ADL)
WHICH_OF_THE_ABOVE_FUNCTIONAL_RISKS_HAD_A_RECENT_ONSET_OR_CHANGE?: AMBULATION;TOILETING;BATHING;DRESSING
ADLS_ACUITY_SCORE: 12
ADLS_ACUITY_SCORE: 11
ADLS_ACUITY_SCORE: 12
ADLS_ACUITY_SCORE: 12
NUMBER_OF_TIMES_PATIENT_HAS_FALLEN_WITHIN_LAST_SIX_MONTHS: 20
ADLS_ACUITY_SCORE: 12
FALL_HISTORY_WITHIN_LAST_SIX_MONTHS: YES

## 2021-03-17 NOTE — PROGRESS NOTES
DATE & TIME: 03/16/2021 Night    Cognitive Concerns/ Orientation : Alert/oriented x 4, flat affect, withdrawn   BEHAVIOR & AGGRESSION TOOL COLOR: Green  CIWA SCORE: 2 (tremors), 0 (sleeping)   ABNL VS/O2: VSS, room air  MOBILITY: SBA, gait belt/walker  PAIN MANAGMENT: denies  DIET: 2 gram sodium  BOWEL/BLADDER: Up to bathroom  ABNL LAB/BG: Albumin 2.7, Na 132, Creat 2.24, Alkaline Phosphatase 265, AST 60, WBC 2.2.  DRAIN/DEVICES: R PIV saline locked  SKIN: Scattered bruises; large bruises on bilateral forearms. Paracentesis site on R lower abd, WDL, bandage CDI.   TESTS/PROCEDURES: Paracentesis on 3/15 with 3 L removed  D/C DAY/GOALS/PLACE: Pending placement, [facility] versus [facility]  OTHER IMPORTANT INFO: Eye drops as needed available

## 2021-03-17 NOTE — PROGRESS NOTES
[facility]  Gastroenterology Progress Note     Jim Richard MRN# [id number]   YOB: 1955 Age: 66 year old          Assessment and Plan:   Jim Richard is a 66 year old male admitted with alcohol withdrawal and decompensated liver cirrhosis with complaints of hallucinations. He was noted to have hemoccult positive stools, large ascites.     Alcoholic cirrhosis of liver with ascites (H)  Alcohol withdrawal (H)  Alcohol dependence with uncomplicated intoxication (H)  Pancytopenia (H)  Skin tear of right elbow without complication, initial encounter  Stage 3b chronic kidney disease  Patient has had stable hemoglobin in mid 8 range .Last EGD 1/23 noting non bleeding esophagitis and grade I esophageal varices.  3/15 ultrasound guided paracentesis ordered for therapeutic reasons- removed 3.5 L.    -- No plans for endoscopy given stable hemoglobin  -- Ok with GI to discharge patient with plans for outpatient f/u in 1- weeks  -- pantoprazole 40 mg BID  -- 2 g sodium diet  -- Continue with current treatment plan and discussed need to avoid alcohol   -- ultrasound guided paracentesis ordered for therapeutic reasons- removed 3.5 L on 3/15            Interval History:   no new complaints, doing well, denies chest pain, denies shortness of breath, alert, oriented to person, place and time, has had a bowel movement in the last 24 hours and doing well; no cp, sob, n/v/d, or abd pain.              Review of Systems:   C: NEGATIVE for fever, chills, change in weight  E/M: NEGATIVE for ear, mouth and throat problems  R: NEGATIVE for significant cough or SOB  CV: NEGATIVE for chest pain, palpitations or peripheral edema             Medications:   I have reviewed this patient's current medications    albumin human  12.5 g Intravenous Once     folic acid  1 mg Oral Daily     lidocaine (buffered or not buffered)  5 mL Intradermal Once     magnesium oxide  400 mg Oral Daily     mirtazapine   30 mg Oral At Bedtime     multivitamin w/minerals  1 tablet Oral Daily     nicotine  1 patch Transdermal Daily     nicotine   Transdermal Q8H     pantoprazole  40 mg Oral BID     QUEtiapine  50 mg Oral At Bedtime     senna-docusate  1 tablet Oral BID     tamsulosin  0.8 mg Oral Daily     thiamine  100 mg Oral TID    Followed by     [START ON 3/18/2021] thiamine  100 mg Oral Daily     vortioxetine  10 mg Oral Daily                  Physical Exam:   Vitals were reviewed  Vital Signs with Ranges  Temp:  [98.5  F (36.9  C)-99.1  F (37.3  C)] 98.5  F (36.9  C)  Pulse:  [74-86] 84  Resp:  [16-18] 18  BP: (103-150)/(62-86) 149/86  SpO2:  [96 %-99 %] 97 %  I/O last 3 completed shifts:  In: 870 [P.O.:870]  Out: -   Constitutional: healthy, alert and no distress   Cardiovascular: negative, PMI normal. No lifts, heaves, or thrills. RRR. No murmurs, clicks gallops or rub  Respiratory: negative, Percussion normal. Good diaphragmatic excursion. Lungs clear  Head: Normocephalic. No masses, lesions, tenderness or abnormalities  Neck: Neck supple. No adenopathy. Thyroid symmetric, normal size,, Carotids without bruits.  Abdomen: Abdomen soft, non-tender. BS normal. No masses, organomegaly             Data:   I reviewed the patient's new clinical lab test results.   Recent Labs   Lab Test 03/16/21  0825 03/14/21  0830 03/13/21  0946 03/10/21  0956 03/10/21  0956 02/12/21  0809   WBC 2.2* 3.3* 2.7*   < > 2.4* 2.6*   HGB 8.4* 8.3* 8.6*   < > 8.2* 7.7*   MCV 96 95 93   < > 95 92   PLT 98* 76* 61*   < > 100* 73*   INR  --   --  1.33*  --  1.27* 1.34*    < > = values in this interval not displayed.     Recent Labs   Lab Test 03/17/21  0737 03/16/21  0825 03/14/21  0830   POTASSIUM 3.7 3.7 3.8   CHLORIDE 105 102 101   CO2 22 23 24   BUN 28 28 30   ANIONGAP 7 7 8     Recent Labs   Lab Test 03/16/21  0825 03/15/21  1035 03/13/21  0946 03/11/21  0636 03/10/21  2252 02/09/21  0223 02/09/21  0223 10/06/20  1610 10/06/20  1610 05/26/20  1612  05/26/20  1612   ALBUMIN 2.7* 2.6* 2.9*  --  3.1*   < > 2.8*   < >  --    < >  --    BILITOTAL 0.9  --  1.3  --  1.8*   < > 0.5   < >  --    < >  --    ALT 38  --  37  --  39   < > 45   < >  --    < >  --    AST 60*  --  71*  --  89*   < > 101*   < >  --    < >  --    PROTEIN  --   --   --  70*  --   --   --   --  10*  --  30*   LIPASE  --   --  490*  --  703*  --  799*   < >  --    < >  --     < > = values in this interval not displayed.       I reviewed the patient's new imaging results.    All laboratory data reviewed  All imaging studies reviewed by me.    Gloria De Leon PA-C,  3/17/2021  [facility]  Office : [phone]  Cell: [phone] (Dr. Shaw)  Cell: [phone] (Gloria De Leon PA-C)

## 2021-03-17 NOTE — PLAN OF CARE
Discharge    Patient discharged to Home via taxi  Care plan note:  VSS.  No c/o chest pain or SOB.  Up ind in room.  Creat 2.16.  Trending down.  Abd distended.  No discomfort noted.    Listed belongings gathered and returned to patient. Yes  Care Plan and Patient education resolved: Yes  Prescriptions if needed, hard copies sent with patient  Yes  Home and hospital acquired medications returned to patient: Yes  Medication Bin checked and emptied on discharge Yes  Follow up appointment made for patient: Yes

## 2021-03-17 NOTE — PROGRESS NOTES
Care Management Discharge Note    Discharge Date: 03/18/21(tcu)       Discharge Disposition: Home  Discharge Services:Home RN oT PT     Discharge DME: None    Discharge Transportation: Taxi     Private pay costs discussed: Not applicable    Education Provided on the Discharge Plan:  Yes  Persons Notified of Discharge Plans: Brother and Casemanager  Patient/Family in Agreement with the Plan:yes    Additional Information:  Per MD patient is medically ready for discharge. Therapy recommends patient transfer to [facility] however patient is refusing and bedside nurse states he is independent.  Writer spoke  with Omayra [facility] behavioral  [phone] who states  if patient is medically stable he could return home and she will work with patient to go into RX program at [place]. They do not have any openings for few days. Omayra states [facility] has been calling patient  when they have a vacancy but patient does not answer his phone. Omayra states she was not aware that [facility] was calling patient instead of her.  Appointment with primary is for 3/26 at 11:10 am  Paracentesis is for 3/31 at 1pm  Patient discharging home with resumptions of services  Patient requesting transportation at discharge. Bedside RN to call [phone] at d/c        Renetta Griffin RN  Inpatient Care Coordinator  [facility]  #[phone]

## 2021-03-17 NOTE — DISCHARGE SUMMARY
[facility]  Hospitalist Discharge Summary      Date of Admission:  3/10/2021  Date of Discharge:  3/17/2021  Discharging Provider: Mauricio Ramírez MD  Discharge Diagnoses   Acute complicated alcohol withdrawal with hallucinosis   Severe alcohol use disorder  Acute hypovolemic hypotension  Acute hyponatremia   Symptomatic ascites status post paracentesis    Follow-ups Needed After Discharge   Follow-up Appointments     Follow-up and recommended labs and tests       Dr. Shaw in 2-3 weeks with CMP and CBC, lipase             Unresulted Labs Ordered in the Past 30 Days of this Admission     No orders found from 2/8/2021 to 3/11/2021.        Discharge Disposition   Discharged to home  Condition at discharge: Stable    Hospital Course   Jim Richard is a 66 year old male with alcohol dependence and cirrhosis secondary to alcohol who was admitted due to alcohol intoxication and weakness.    Acute complicated alcohol withdrawal with hallucinosis   Severe alcohol use disorder  Alcohol level on presentation was 0.34. The patient is on a commitment 11/2020 through 5/2021 through [facility].   Overnight on 3/11-3/12 he developed worsening tremulousness, hallucinations necessitating multiple dosages of ativan.  Last 3 CIWA scores= 2,2,2. No hallucinations and tremor better.    Discharge home and follow up with GI    He is already on a CD commitment    Hypotension, acute  Resolved with intravenous fluid.  Transthoracic echocardiogram ok.    Elevated lipase- likely chemical pancreatitis from alcohol   Improving and asymptomatic- no further imaging at this time    Alcoholic cirrhosis  Chronic ascites  History of banding of esophageal varices in 2018  History of esophagitis, grade I esophageal varices, portal hypertensive gastropathy, and erythematous duodenopathy  Fecal occult blood positive  Albumin was 3.1, INR 1.3, TB 1.3, Na 129, Creatinine 2  He has paracenteses ~ every 2 weeks. Pt  reports multiple falls in the last several weeks.   Status post paracentesis in the ED- no peritonitis. No abdominal pain. Fecal occult blood positive but the patient is not aware of blood in his stool or black stools.  Abdomen is very distended today.  Hepatitis C negative.  A IgG positive B surface Ag negative, Ab negative.  Status post paracentesis on 3/15 with 3.5L fluid removed.  He feels much less distended today.    Follow up with GI    Paracentesis in 2 weeks     Chronic anemia and thrombocytopenia  Pancytopenia  Hemoglobin   Date Value Ref Range Status   03/16/2021 8.4 (L) 13.3 - 17.7 g/dL Final   03/14/2021 8.3 (L) 13.3 - 17.7 g/dL Final     Platelet Count   Date Value Ref Range Status   03/16/2021 98 (L) 150 - 450 10e9/L Final   Fecal occult blood positive;  iron, B12 and folate levels ok,  Smear pending. HIV negative.  Smear consistent with cirrhosis, chronic kidney disease     CBC at GI visit     Benign prostatic hypertrophy  History of urinary retention as well as noncompliance with medications.    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   Hyponatremia   Creatinine   Date Value Ref Range Status   03/16/2021 2.24 (H) 0.66 - 1.25 mg/dL Final     Sodium   Date Value Ref Range Status   03/16/2021 132 (L) 133 - 144 mmol/L Final     Recheck at GI follow up      Chronic obstructive lung disease   Tobacco use     Inhaled bronchodilators PRN    Continue nicotine      Depression  Anxiety    Continue quetiapine, vortioxetine, mirtazapine    Consultations This Hospital Stay   CARE MANAGEMENT / SOCIAL WORK IP CONSULT  PHYSICAL THERAPY ADULT IP CONSULT  GASTROENTEROLOGY IP CONSULT    Code Status   Full Code    Time Spent on this Encounter   I, Mauricio Ramírez MD, personally saw the patient today and spent less than or equal to 30 minutes discharging this patient.     Mauricio Ramírez MD  [place] [address] [facility]  [address] [place]  [address]  Phone: [phone]  ______________________________________________________________________    Physical Exam   Vital Signs: Temp: 98.5  F (36.9  C) Temp src: Oral BP: (!) 149/86 Pulse: 84   Resp: 18 SpO2: 97 % O2 Device: None (Room air)    Weight: 172 lbs 12.8 oz  Constitutional: awake, alert, cooperative, no apparent distress  GI: normal bowel sounds, soft, distended, non-tender  Neurologic: Awake, alert, oriented to name, place and time.  Cranial nerves II-XII are grossly intact.  Motor is 5 out of 5 bilaterally.  No significant tremor.  Neuropsychiatric: General: normal, calm and normal eye contact       Primary Care Physician   FERNANDA BRANTLEY    Discharge Orders      Home care nursing referral      Reason for your hospital stay    Alcohol withdrawal     Follow-up and recommended labs and tests     Dr. Shaw in 2-3 weeks with CMP and CBC, lipase     Activity    Your activity upon discharge: activity as tolerated     Diet    Follow this diet upon discharge:      2 Gram Sodium Diet       Significant Results and Procedures   Most Recent 3 CBC's:  Recent Labs   Lab Test 03/16/21  0825 03/14/21  0830 03/13/21  0946   WBC 2.2* 3.3* 2.7*   HGB 8.4* 8.3* 8.6*   MCV 96 95 93   PLT 98* 76* 61*     Most Recent 3 BMP's:  Recent Labs   Lab Test 03/17/21  0737 03/16/21  0825 03/14/21  0830    132* 133   POTASSIUM 3.7 3.7 3.8   CHLORIDE 105 102 101   CO2 22 23 24   BUN 28 28 30   CR 2.16* 2.24* 2.19*   ANIONGAP 7 7 8   KEV 8.3* 8.1* 8.0*   GLC 97 112* 148*     Most Recent 2 LFT's:  Recent Labs   Lab Test 03/16/21  0825 03/13/21  0946   AST 60* 71*   ALT 38 37   ALKPHOS 265* 255*   BILITOTAL 0.9 1.3   ,   Results for orders placed or performed during the hospital encounter of 03/10/21   Elbow XR, G/E 3 views, right    Narrative    EXAM: XR ELBOW RT G/E 3 VW  LOCATION: [facility]  DATE/TIME: 3/10/2021 11:28 PM    INDICATION: Fall, pain  COMPARISON: None.      Impression    IMPRESSION:  Degenerative spur at the lateral humeral condyle likely along the radial collateral ligament. Small olecranon enthesophyte. No visualized joint effusion or displaced fracture. No dislocation.   XR Chest 2 Views    Narrative    EXAM: XR CHEST 2 VW  LOCATION: [facility]  DATE/TIME: 3/10/2021 11:27 PM    INDICATION: Rib pain after fall  COMPARISON: 10/09/2020.      Impression    IMPRESSION: Heart size within normal limits for portable technique. Pulmonary vascularity normal. The lungs are clear. There are several old bilateral rib fractures. No definite findings for acute fractures.   US Paracentesis    Narrative    US PARACENTESIS 3/12/2021 12:57 PM     HISTORY: HIGH VOLUME paracentesis with or without diagnostic fluid  analysis with labs to be drawn if ordered. Total paracentesis volume  as much as possible.    FINDINGS: Limited preprocedure ultrasound was performed, images show a  sufficient amount of ascites for paracentesis. An image was archived.  Written and oral informed consent was obtained. A pause for the cause  procedure to verify the correct patient and correct procedure. The  skin overlying the right lower quadrant was prepped and draped in the  usual sterile fashion. The subcutaneous tissues were anesthetized with  10mL 1% Lidocaine. Under direct ultrasound guidance the catheter was  advanced into the peritoneal space and 4.1 L of  straw colored fluid  was drained. The catheter was removed and a sterile dressing was  applied. There were no immediate complications. Ultrasound images were  permanently stored.  Patient left the ultrasound suite in satisfactory  condition.      Impression    IMPRESSION: Technically successful paracentesis without immediate  complications.    JAVIER JUDD, DO   US Paracentesis    Narrative    US PARACENTESIS 3/15/2021 2:17 PM    CLINICAL HISTORY: HIGH VOLUME paracentesis with or without diagnostic  fluid analysis with labs to be drawn if ordered. Total  paracentesis  volume as much as possible.    PROCEDURE: Informed consent obtained. Time out performed. The abdomen  was prepped and draped in a sterile fashion. 10 mL of 1% lidocaine was  infused into local soft tissues. A 5 Moldovan catheter system was  introduced into the abdominal ascites under ultrasound guidance.    3.5 liters of clear fluid were removed and sent to lab if requested.    Patient tolerated procedure well.    Ultrasound imaging was obtained and placed in the patient's permanent  medical record.      Impression    IMPRESSION:  1.  Status post ultrasound-guided paracentesis.    LESTER J FAHRNER, MD   Echocardiogram Complete    Narrative    [id number]  [id number]  [id number]  [id number]^HEIDY^CHINO^MAXINE           [facility]  Echocardiography Laboratory  [address] [place] [address]        Name: DEVONTE SEGURA  MRN: [id number]  : 1955  Study Date: 03/15/2021 08:30 AM  Age: 66 yrs  Gender: Male  Patient Location: [facility]  Reason For Study: RBBB  Ordering Physician: CHINO MOODY  Referring Physician: Talon Elias  Performed By: Tara Adkins     BSA: 1.9 m2  Height: 67 in  Weight: 173 lb  HR: 87  BP: 78/49 mmHg  _____________________________________________________________________________  __        Procedure  Complete Portable Echo Adult.  _____________________________________________________________________________  __        Interpretation Summary     1. The left ventricle is mildly dilated. The visual ejection fraction is  estimated at 52%.  2. The right ventricle is normal in structure, function and size.  3. No valve disease.  4. The ascending aorta is Mildly dilated. 4.2cm.     No previous echo for comparison.  _____________________________________________________________________________  __        Left Ventricle  The left ventricle is mildly dilated. There is normal left ventricular wall  thickness. The visual ejection fraction is estimated  at 52%. Left ventricular  diastolic function is normal. Normal left ventricular wall motion.     Right Ventricle  The right ventricle is normal in structure, function and size.     Atria  Normal left atrial size. Right atrial size is normal. There is no atrial shunt  seen.     Mitral Valve  There is mild (1+) mitral regurgitation.        Tricuspid Valve  There is mild (1+) tricuspid regurgitation. The right ventricular systolic  pressure is approximated at 17mmHg plus the right atrial pressure.     Aortic Valve  The aortic valve is normal in structure and function.     Pulmonic Valve  The pulmonic valve is normal in structure and function.     Vessels  The ascending aorta is Mildly dilated. The inferior vena cava was normal in  size with preserved respiratory variability.     Pericardium  There is no pericardial effusion.        Rhythm  Sinus rhythm was noted.  _____________________________________________________________________________  __           Doppler Measurements & Calculations  TV V2 max: 207.6 cm/sec  TV max P.2 mmHg           _____________________________________________________________________________  __           Report approved by: Kenton Valle 03/15/2021 10:09 AM            Discharge Medications   Current Discharge Medication List      CONTINUE these medications which have NOT CHANGED    Details   atenolol (TENORMIN) 25 MG tablet Take 25 mg by mouth daily Filled 20 #30      fluticasone-vilanterol (BREO ELLIPTA) 200-25 MCG/INH inhaler Inhale 1 puff into the lungs daily as needed (SOB)  Qty: 28 each, Refills: 0    Associated Diagnoses: Alcoholic intoxication without complication (H)      folic acid (FOLVITE) 1 MG tablet Take 1 mg by mouth daily      hydrOXYzine (ATARAX) 50 MG tablet Take 1 tablet (50 mg) by mouth nightly as needed for other (sleep)  Qty: 30 tablet, Refills: 0    Associated Diagnoses: Alcoholic intoxication without complication (H)      magnesium oxide (MAG-OX) 400  (240 Mg) MG tablet Take 400 mg by mouth daily      mirtazapine (REMERON) 30 MG tablet Take 30 mg by mouth At Bedtime Filled 1/11/21 for #30      multivitamin w/minerals (THERA-VIT-M) tablet Take 1 tablet by mouth daily  Qty: 30 tablet, Refills: 0    Associated Diagnoses: Alcoholic intoxication without complication (H)      nicotine (COMMIT) 2 MG lozenge Place 1 lozenge (2 mg) inside cheek every hour as needed for smoking cessation  Qty: 144 lozenge, Refills: 0    Associated Diagnoses: Alcoholic intoxication without complication (H)      nicotine (NICODERM CQ) 21 MG/24HR 24 hr patch Place 1 patch onto the skin daily  Qty: 30 patch, Refills: 0    Associated Diagnoses: Alcoholic intoxication without complication (H)      pantoprazole (PROTONIX) 40 MG EC tablet Take 1 tablet (40 mg) by mouth 2 times daily  Qty: 60 tablet, Refills: 0    Associated Diagnoses: Alcoholic intoxication without complication (H)      !! QUEtiapine (SEROQUEL) 100 MG tablet Take 100 mg by mouth At Bedtime Filled 1/11/21 #30      !! QUEtiapine (SEROQUEL) 50 MG tablet Take 1 tablet (50 mg) by mouth 3 times daily as needed (anxiety) Filled 11/20/20 #30  Qty: 90 tablet, Refills: 0    Associated Diagnoses: Anxiety and depression      tamsulosin (FLOMAX) 0.4 MG capsule Take 2 capsules (0.8 mg) by mouth daily  Qty: 30 capsule, Refills: 0    Associated Diagnoses: Benign prostatic hyperplasia with urinary retention      traZODone (DESYREL) 100 MG tablet Take 100 mg by mouth At Bedtime Filled 1/11/21 #30      vortioxetine (TRINTELLIX) 10 MG tablet Take 1 tablet (10 mg) by mouth daily  Qty: 30 tablet, Refills: 0    Associated Diagnoses: Severe episode of recurrent major depressive disorder, without psychotic features (H)       !! - Potential duplicate medications found. Please discuss with provider.      STOP taking these medications       acetaminophen (TYLENOL) 325 MG tablet Comments:   Reason for Stopping:             Allergies   Allergies   Allergen  Reactions     Amlodipine Swelling     Lisinopril      Other reaction(s): Angioedema  Mouth and tongue swelling   Mouth and tongue swelling

## 2021-03-17 NOTE — PROGRESS NOTES
"BRIEF NUTRITION ASSESSMENT    REASON FOR ASSESSMENT:  Jim Richard is a 66 year old male seen by Registered Dietitian for LOS    NUTRITION HISTORY:  - Known from multiple recent admissions. Jim typically eats well while inpatient, though does not care for self well at home. May eat up to 1 meal/day when outside of the hospital.   - Admitted after falling at home, unable to care for self.     - Patient today states that he has been eating meals BID since he was last discharged. Notes that he \"probably\" doesn't eat as well outside the hospital as he does here.   - Hoping to discharge today.     CURRENT DIET AND INTAKE:  Diet:  Regular diet  Eating 100% of small meals up to 5-6x daily. Yesterday received 2293 kcal and 83 g protein. Pt states appetite is good \"I'm eating a lot\"    ANTHROPOMETRICS:  Height: 5' 7\"  Weight:  172 lbs 12.8 oz (78.2 kg)  Body mass index is 27.06 kg/m .   Weight Status: Overweight BMI 25-29.9  IBW:  67.3 kg   %IBW: 116%  Weight History: Wt fluctuates with fluid status. Overall stable, recent admit wts range 80.9-83.9 kg. Pt denies wt changes.   Wt Readings from Last 10 Encounters:   03/16/21 78.4 kg (172 lb 12.8 oz)   03/10/21 86.2 kg (190 lb)   02/13/21 79.5 kg (175 lb 4.3 oz)   02/03/21 86.2 kg (190 lb)   01/26/21 82.9 kg (182 lb 12.2 oz)   01/15/21 88.8 kg (195 lb 11.2 oz)   01/05/21 86.2 kg (190 lb)   12/02/20 81.6 kg (180 lb)   11/18/20 85.7 kg (189 lb)   10/11/20 80.9 kg (178 lb 5.6 oz)       LABS/MEDS/PHYSICAL FINDINGS:  Reviewed  3/12 - Paracentesis 4.1 L dark yellow fluid removed  3/15 - Paracentesis 3.5L removed     MALNUTRITION:  Patient does not meet two of the criteria necessary for diagnosing malnutrition.     NUTRITION INTERVENTION:  Nutrition Diagnosis:  No nutrition diagnosis at this time.    Implementation:  Nutrition Education: Reviewed rationale for 2g Na diet order.     FOLLOW UP/MONITORING:   Will re-evaluate in 7 - 10 days, or sooner, if re-consulted.    Summer" ROCÍO Isaac, LD  [address], [place]   Pager: [phone]  Weekend Pager: [phone]

## 2021-03-17 NOTE — PLAN OF CARE
DATE & TIME: 3/17/2021 4755-6174   Cognitive Concerns/ Orientation : A&OX4.  Forgetful   BEHAVIOR & AGGRESSION TOOL COLOR: Green  CIWA SCORE: 2, 2  ABNL VS/O2: VSS  MOBILITY: SBA  PAIN MANAGMENT: Denies  DIET: 2gm NA  BOWEL/BLADDER: Continent.  BM X1 this shift  ABNL LAB/BG: Creat 2.16.  Trending down  DRAIN/DEVICES: PIV SL  TELEMETRY RHYTHM: NA  SKIN: Scattered Bruising.  Paracentesis site RL abd.  Bandage C, D, I  TESTS/PROCEDURES: Paracentesis done 3/15  D/C DAY/GOALS/PLACE: PT recommending TCU at this time. Pending placement  OTHER IMPORTANT INFO: Dim LS in LLL.  Abd distended and firm in some areas.  No discomfort.  Pt steady on feet.  Walked hallway X2 this shift.  C/O Anxiety X1.  Seroquel given with relief

## 2021-03-17 NOTE — PLAN OF CARE
DATE & TIME: 3/16/2021 3414-1581  Cognitive Concerns/ Orientation : A&Ox4, calm/cooperative.  BEHAVIOR & AGGRESSION TOOL COLOR: Green  CIWA SCORE: 3.1(tremor)  ABNL VS/O2: VSS on RA  MOBILITY: Up SBA with GB/walker.   PAIN MANAGMENT: denies pain this shift.  DIET:  2g Na, tolerating   BOWEL/BLADDER: Continent; voiding in urinal   ABNL LAB/BG:  Albumin 2.7, Na 132, Creat 2.24, Alkaline Phosphatase 265, AST 60, WBC 2.2.  DRAIN/DEVICES: PIV, saline locked   TELEMETRY RHYTHM: n/a  SKIN: scattered bruises; large bruises on bilateral forearms. Paracentesis site on R lower abd, WDL, bandage CDI.   TESTS/PROCEDURES: Paracentesis 3/15, removed 3.5 L fluid/ none today  D/C DAY/GOALS/PLACE: pending TCU placement, SW following.  OTHER IMPORTANT INFO: Abdomen rounded, distended, soft, improved after paracentesis per pt. GI following. C/O dry eyes, PRN eye drops given x2.PRN Seroquel and melatonin given per pt request.

## 2021-03-17 NOTE — PROGRESS NOTES
Patient discharged home. Discharge instructions reviewed with patient and teach back done, patient able to teach back. Gave patient his discharge instruction papers. Called cab for patient transportation. Patient took all his belongings.

## 2021-03-18 NOTE — PLAN OF CARE
Physical Therapy Discharge Summary    Reason for therapy discharge:    Discharged to home.    Progress towards therapy goal(s). See goals on Care Plan in [facility] electronic health record for goal details.  Goals partially met.  Barriers to achieving goals:   discharge from facility.    Therapy recommendation(s):    Continued therapy is recommended.  Rationale/Recommendations:  TCU was recommended as pt below baseline of ind without AD, using FWW during PT sessions. Pt discharged to home; pt would benefit from continued PT to progress independence with gait without AD, train balance and optimize safety & independence with mobility..

## 2021-03-26 ENCOUNTER — HOSPITAL ENCOUNTER (EMERGENCY)
Facility: CLINIC | Age: 66
Discharge: HOME | End: 2021-03-27
Attending: EMERGENCY MEDICINE | Admitting: EMERGENCY MEDICINE
Payer: MEDICARE

## 2021-03-26 ENCOUNTER — APPOINTMENT (OUTPATIENT)
Dept: GENERAL RADIOLOGY | Facility: CLINIC | Age: 66
End: 2021-03-26
Attending: EMERGENCY MEDICINE
Payer: MEDICARE

## 2021-03-26 DIAGNOSIS — Z87.898 HISTORY OF ALCOHOL USE DISORDER: ICD-10-CM

## 2021-03-26 DIAGNOSIS — F10.930 ALCOHOL WITHDRAWAL, UNCOMPLICATED (H): ICD-10-CM

## 2021-03-26 DIAGNOSIS — K70.31 ALCOHOLIC CIRRHOSIS OF LIVER WITH ASCITES (H): ICD-10-CM

## 2021-03-26 LAB
ALBUMIN SERPL-MCNC: 2.7 G/DL (ref 3.4–5)
ALP SERPL-CCNC: 213 U/L (ref 40–150)
ALT SERPL W P-5'-P-CCNC: 26 U/L (ref 0–70)
ANION GAP SERPL CALCULATED.3IONS-SCNC: 4 MMOL/L (ref 3–14)
AST SERPL W P-5'-P-CCNC: 36 U/L (ref 0–45)
BASOPHILS # BLD AUTO: 0 10E9/L (ref 0–0.2)
BASOPHILS NFR BLD AUTO: 0.4 %
BILIRUB SERPL-MCNC: 2.2 MG/DL (ref 0.2–1.3)
BUN SERPL-MCNC: 25 MG/DL (ref 7–30)
CALCIUM SERPL-MCNC: 8.2 MG/DL (ref 8.5–10.1)
CHLORIDE SERPL-SCNC: 105 MMOL/L (ref 94–109)
CO2 SERPL-SCNC: 27 MMOL/L (ref 20–32)
CREAT SERPL-MCNC: 1.52 MG/DL (ref 0.66–1.25)
DIFFERENTIAL METHOD BLD: ABNORMAL
EOSINOPHIL # BLD AUTO: 0 10E9/L (ref 0–0.7)
EOSINOPHIL NFR BLD AUTO: 0.2 %
ERYTHROCYTE [DISTWIDTH] IN BLOOD BY AUTOMATED COUNT: 20.8 % (ref 10–15)
ETHANOL SERPL-MCNC: <0.01 G/DL
GFR SERPL CREATININE-BSD FRML MDRD: 47 ML/MIN/{1.73_M2}
GLUCOSE SERPL-MCNC: 103 MG/DL (ref 70–99)
HCT VFR BLD AUTO: 25.1 % (ref 40–53)
HGB BLD-MCNC: 8.3 G/DL (ref 13.3–17.7)
IMM GRANULOCYTES # BLD: 0 10E9/L (ref 0–0.4)
IMM GRANULOCYTES NFR BLD: 0.6 %
INR PPP: 1.24 (ref 0.86–1.14)
LIPASE SERPL-CCNC: 444 U/L (ref 73–393)
LYMPHOCYTES # BLD AUTO: 0.3 10E9/L (ref 0.8–5.3)
LYMPHOCYTES NFR BLD AUTO: 6.4 %
MCH RBC QN AUTO: 32.2 PG (ref 26.5–33)
MCHC RBC AUTO-ENTMCNC: 33.1 G/DL (ref 31.5–36.5)
MCV RBC AUTO: 97 FL (ref 78–100)
MONOCYTES # BLD AUTO: 0.4 10E9/L (ref 0–1.3)
MONOCYTES NFR BLD AUTO: 9.1 %
NEUTROPHILS # BLD AUTO: 4 10E9/L (ref 1.6–8.3)
NEUTROPHILS NFR BLD AUTO: 83.3 %
NRBC # BLD AUTO: 0 10*3/UL
NRBC BLD AUTO-RTO: 0 /100
PLATELET # BLD AUTO: 162 10E9/L (ref 150–450)
POTASSIUM SERPL-SCNC: 4 MMOL/L (ref 3.4–5.3)
PROT SERPL-MCNC: 7 G/DL (ref 6.8–8.8)
RBC # BLD AUTO: 2.58 10E12/L (ref 4.4–5.9)
SODIUM SERPL-SCNC: 136 MMOL/L (ref 133–144)
WBC # BLD AUTO: 4.8 10E9/L (ref 4–11)

## 2021-03-26 PROCEDURE — 96374 THER/PROPH/DIAG INJ IV PUSH: CPT

## 2021-03-26 PROCEDURE — 82077 ASSAY SPEC XCP UR&BREATH IA: CPT | Performed by: EMERGENCY MEDICINE

## 2021-03-26 PROCEDURE — 85025 COMPLETE CBC W/AUTO DIFF WBC: CPT | Performed by: EMERGENCY MEDICINE

## 2021-03-26 PROCEDURE — 99285 EMERGENCY DEPT VISIT HI MDM: CPT | Mod: 25

## 2021-03-26 PROCEDURE — 80053 COMPREHEN METABOLIC PANEL: CPT | Performed by: EMERGENCY MEDICINE

## 2021-03-26 PROCEDURE — 250N000011 HC RX IP 250 OP 636: Performed by: EMERGENCY MEDICINE

## 2021-03-26 PROCEDURE — 85610 PROTHROMBIN TIME: CPT | Performed by: EMERGENCY MEDICINE

## 2021-03-26 PROCEDURE — 83690 ASSAY OF LIPASE: CPT | Performed by: EMERGENCY MEDICINE

## 2021-03-26 PROCEDURE — 71046 X-RAY EXAM CHEST 2 VIEWS: CPT

## 2021-03-26 RX ORDER — DIAZEPAM 10 MG/2ML
5 INJECTION, SOLUTION INTRAMUSCULAR; INTRAVENOUS ONCE
Status: COMPLETED | OUTPATIENT
Start: 2021-03-26 | End: 2021-03-26

## 2021-03-26 RX ADMIN — DIAZEPAM 5 MG: 5 INJECTION, SOLUTION INTRAMUSCULAR; INTRAVENOUS at 22:33

## 2021-03-26 ASSESSMENT — ENCOUNTER SYMPTOMS
FEVER: 0
APPETITE CHANGE: 1
CHILLS: 1
VOMITING: 0
NAUSEA: 1
DIARRHEA: 0
SHORTNESS OF BREATH: 1
BLOOD IN STOOL: 0
CONSTIPATION: 0
ABDOMINAL PAIN: 1
COUGH: 0
ABDOMINAL DISTENTION: 1

## 2021-03-26 ASSESSMENT — MIFFLIN-ST. JEOR: SCORE: 1595.46

## 2021-03-27 VITALS
BODY MASS INDEX: 29.82 KG/M2 | SYSTOLIC BLOOD PRESSURE: 142 MMHG | DIASTOLIC BLOOD PRESSURE: 70 MMHG | TEMPERATURE: 99.1 F | HEIGHT: 67 IN | RESPIRATION RATE: 20 BRPM | WEIGHT: 190 LBS | HEART RATE: 64 BPM | OXYGEN SATURATION: 96 %

## 2021-03-27 PROCEDURE — 76942 ECHO GUIDE FOR BIOPSY: CPT

## 2021-03-27 PROCEDURE — 250N000011 HC RX IP 250 OP 636: Performed by: EMERGENCY MEDICINE

## 2021-03-27 PROCEDURE — 96376 TX/PRO/DX INJ SAME DRUG ADON: CPT | Mod: 59

## 2021-03-27 PROCEDURE — 49083 ABD PARACENTESIS W/IMAGING: CPT

## 2021-03-27 RX ORDER — DIAZEPAM 10 MG/2ML
5 INJECTION, SOLUTION INTRAMUSCULAR; INTRAVENOUS ONCE
Status: COMPLETED | OUTPATIENT
Start: 2021-03-27 | End: 2021-03-27

## 2021-03-27 RX ORDER — CHLORDIAZEPOXIDE HYDROCHLORIDE 25 MG/1
CAPSULE, GELATIN COATED ORAL
Qty: 11 CAPSULE | Refills: 0 | Status: SHIPPED | OUTPATIENT
Start: 2021-03-27 | End: 2021-04-01

## 2021-03-27 RX ADMIN — DIAZEPAM 5 MG: 5 INJECTION, SOLUTION INTRAMUSCULAR; INTRAVENOUS at 02:27

## 2021-03-27 NOTE — ED TRIAGE NOTES
Pt called 911 due to distended abd and sob. Pt was discharged from here and told to follow up for paracentesis and never did. Pt has distended abdomen. Tremors. Last etoh use yesterday.

## 2021-03-27 NOTE — ED NOTES
Bed: ED19  Expected date:   Expected time:   Means of arrival:   Comments:  443 66m SOB distended abd eta 10

## 2021-03-27 NOTE — ED NOTES
RN called [facility] and [facility] to see if any male beds available. Both facilities are at capacity. Pt refuses to go to [address] [place] [address].

## 2021-03-27 NOTE — ED PROVIDER NOTES
History   Chief Complaint:  Abdominal Distention, Abdominal Pain, Shortness of Breath    HPI   Jim Richard is a 66 year old male, s/p left fem-pop bypass d/t PAD with a history of alcoholic cirrhosis with ascites (last paracentesis on 3/10 and admission for treatment of symptomatic ascites s/p paracentesis), PVD, alcohol abuse, esophogeal varices, hypertension, hyperlipidemia, and CAD, who presents to the ED for evaluation of recurrent and increasing abdominal distention, abdominal pain, and shortness of breath. The patient reports he has been having an increase in abdominal distention and swelling for the past couple of days. He states he has developed a diffuse mild pain along with some shortness of breath due to the increased fluid and came in today to be evaluated. Here, he states he was nauseous this morning and had several dry-heaving episodes, but did not have any emesis. The patient denies any fever, diaphoresis, or cough. However, he has been chilled and tremulous. He notes he last consumed alcohol last night, which consisted of a 750 ml bottle of vodka. He has not had alcohol withdrawal seizures in the past, but does feel as if he is having mild alcohol withdrawal symptoms at this time. He states he went through detox last weekend at his home. He has been through a detox program in the past. He reports he is scheduled to go into a treatment facility on Monday or Tuesday in [facility] [place]. The patient has not had any black or bloody stools. The patient denies any diarrhea or constipation, but has had a decreased appetite. He denies any other symptoms.    Review of Systems   Constitutional: Positive for appetite change and chills. Negative for fever.   Respiratory: Positive for shortness of breath. Negative for cough.    Gastrointestinal: Positive for abdominal distention, abdominal pain and nausea. Negative for blood in stool, constipation, diarrhea and vomiting.   All other systems  "reviewed and are negative.    Allergies:  Amlodipine  Lisinopril     Medications:    Atenolol  Hydroxyzine  Mirtazapine  Protonix  Seroquel  Flomax  Desyrel  Trintellix     Past Medical History:    Alcohol abuse  Anxiety  CAD  Depression  Esophageal varices with bleeding  Hepatomegaly  Hyperlipidemia  Hypertension  JANIS  PVD  Subdural hematoma  Spinal stenosis  Substance abuse  Alcoholic cirrhosis with ascites  PAD  Alcoholic ketoacidosis  Colitis  Intentional overdose  Chronic back pain     Past Surgical History:    Appendectomy  Femoropopliteal, left, bypass graft  Laminectomy  Tonsillectomy  Adenoidectomy  Femoral endarterectomy     Family History:    CAD  Substance abuse  Lung disease  Mental illness    Social History:  Smoking status: Yes  Alcohol use: Yes  PCP: FERNANDA BRANTLEY  Presents to the ED alone    Physical Exam     Patient Vitals for the past 24 hrs:   BP Temp Temp src Pulse Resp SpO2 Height Weight   03/27/21 0530 (!) 142/70 -- -- 64 -- 96 % -- --   03/27/21 0500 137/75 -- -- 65 -- 97 % -- --   03/27/21 0432 136/66 -- -- 67 -- 96 % -- --   03/27/21 0400 138/73 -- -- 70 -- 95 % -- --   03/27/21 0330 128/66 -- -- 68 -- 96 % -- --   03/27/21 0302 134/57 -- -- 72 -- -- -- --   03/27/21 0230 113/53 -- -- 83 -- 96 % -- --   03/27/21 0200 (!) 161/75 -- -- 82 -- 96 % -- --   03/27/21 0100 (!) 169/88 -- -- 91 -- 96 % -- --   03/27/21 0000 (!) 150/83 -- -- 77 -- 97 % -- --   03/26/21 2330 (!) 156/79 -- -- 75 -- 97 % -- --   03/26/21 2300 (!) 153/94 -- -- -- -- 97 % -- --   03/26/21 2230 (!) 168/81 -- -- 76 -- 98 % -- --   03/26/21 2200 (!) 168/89 -- -- 78 -- 98 % -- --   03/26/21 2145 (!) 161/81 -- -- 75 -- 98 % -- --   03/26/21 2129 (!) 170/82 99.1  F (37.3  C) Oral 87 20 98 % 1.702 m (5' 7\") 86.2 kg (190 lb)       Physical Exam  General: Well appearing, nontoxic. Resting comfortably  Head:  Scalp, face, and head appear normal  Eyes:  Pupils are equal, round    Conjunctivae non-injected and sclerae " white  ENT:    The external nose is normal    Pinnae are normal  Neck:  Normal range of motion    There is no rigidity noted    Trachea is in the midline  CV:  Regular rate and rhythm     Normal S1/S2, no S3/S4    No murmur or rub. Radial pulses 2+ bilaterally.  Resp:  Lungs are clear and equal bilaterally  There is no tachypnea    No increased work of breathing    No rales, wheezing, or rhonchi  GI:  Abdomen is soft, no rigidity or guarding    Significant distension with ascites. No palpable  mass    No tenderness or rebound tenderness   MS:  Normal muscular tone    Symmetric motor strength  Skin:  No rash or acute skin lesions noted  Neuro: Awake and alert  Speech is normal and fluent  Moves all extremities spontaneously  Psych:  Normal affect. Appropriate interactions.      Emergency Department Course   Imaging:    XR Chest, 2 views:  No significant change. Heart size and pulmonary vessels upper limits of normal. Coronary artery calcification or possible stent present. Pulmonary vessels normal. Lungs are clear. Old bilateral rib fractures.    Imaging independently reviewed and agree with radiologist interpretation.    POC US Guide for Paracentesis:  Significant free fluid consistent with ascites seen in the right lower quadrant with greatest pocket measuring 6 cm in depth.  Site was selected in the right lateral lower abdominal wall for paracentesis and marked.  Paracentesis was then performed.  See separate procedure note. Per Dr. Menezes.    Laboratory:  CBC: HGB 8.3 (L), o/w WNL (WBC 4.8, )    CMP:  (H),  Creatinine 1.52 (H), GFR 47 (L), Ca 8.2 (L), Bilirubin Total 2.2 (H), Albumin 2.7 (L), ALKPHOS 213 (H), o/w WNL    INR: 1.24 (H)    Lipase: 444 (H)    Alcohol ethyl: <0.01    [facility]    -Paracentesis    Date/Time: 3/27/2021 1:08 AM  Performed by: Krish Menezes MD  Authorized by: Krish Menezes MD       PRE-PROCEDURE DETAILS     Procedure purpose:   "Therapeutic    ANESTHESIA (see MAR for exact dosages):     Anesthesia method:  Local infiltration    Local anesthetic:  Lidocaine 1% w/o epi    PROCEDURE DETAILS     Ultrasound guidance: yes      Puncture site:  R lower quadrant    Fluid removed amount:  4L    Fluid appearance:  Yellow and clear    Dressing:  Adhesive bandage (Dermabond)    PROCEDURE   Patient Tolerance:  Patient tolerated the procedure well with no immediate complications  Describe Procedure: Ultrasound was used to identify ascites fluid pocket >5cm deep in the RLQ. No overlying vascular structures.    Emergency Department Course:    Reviewed:  I reviewed the patient's nursing notes, vitals, past available medical records.     Assessments:  2220: I obtained history and examined the patient as noted above.     0000: I rechecked the patient and explained findings.     0108: I performed the paracentesis per the above procedure note.     0400: I rechecked the patient and updated him on the findings. He is amendable to discharge and plan.     0549: The patient passed the road test and is ready for discharge.     Interventions:  2233: Valium 5 mg IV  0227: Valium 5 mg IV    Disposition:  The patient was discharged to home.    Impression & Plan    Medical Decision Making:  Jim Richard is a 66 year old male with a history of ongoing alcohol use disorder, alcoholic cirrhosis with ascites requiring recurrent paracentesis presents with worsening abdominal distention and shortness of breath, abdominal fullness and poor appetite due to recurrent ascites.  On my evaluation he is well-appearing, hemodynamically stable and afebrile.  His abdominal exam is distended but otherwise not significantly tender with no evidence of peritonitis or acute surgical emergency.  Patient is still using alcohol.  He reports that he is \"first on the list\" for a bed at [facility] alcohol treatment program which  believes he will be going to on Monday or Tuesday.  " Work-up in the emergency department is reassuring.  Creatinine is improved from prior.  LFTs are not significantly elevated.  CBC shows baseline stable anemia.  No leukocytosis.  No significant thrombocytopenia.  INR 1.24.  Ethanol level is negative.  Chest x-ray is obtained and negative for evidence of acute thoracic pathology including pneumothorax, pleural effusions, pneumonia, pulmonary edema or other findings.  Shortness of breath is likely due to pressure on the diaphragm from his significant ascites.  Diagnostic paracentesis was performed and 4 L of clear yellow ascites fluid was drained in the emergency department.  The patient symptoms significantly improved following this.  At this time he has no clinical or laboratory evidence of SBP.  The patient would prefer to go to a detox center over the weekend prior to being admitted to [facility] for treatment however unfortunately there are no detox beds available in the [facility].  I discussed this with him and at this time he would prefer to return home.  I recommended a Librium taper to help treat his alcohol withdrawal symptoms and stressed the importance of maintaining sobriety until he is able to enter treatment on Monday or Tuesday.  At this time there is no indication for hospitalization.  I stressed the importance of very close follow-up with his primary care physician, gastroenterology/hepatology and immediate return to the emergency department for any worsening.  The patient was agreeable to plan of care.  Close return precautions were provided he was discharged in stable and improved condition.    Diagnosis:    ICD-10-CM    1. History of alcohol use disorder  Z87.898    2. Alcohol withdrawal, uncomplicated (H)  F10.230    3. Alcoholic cirrhosis of liver with ascites (H)  K70.31        Discharge Medications:  New Prescriptions    CHLORDIAZEPOXIDE (LIBRIUM) 25 MG CAPSULE    Take 1 capsule (25 mg) by mouth 4 times daily for 1 day, THEN 1 capsule  (25 mg) 3 times daily as needed for anxiety (alcohol withdrawal symptoms (tremor, anxiety, palpitations)) for 1 day, THEN 1 capsule (25 mg) 2 times daily as needed for anxiety (alcohol withdrawal symptoms (tremor, anxiety, palpitations)) for 1 day, THEN 1 capsule (25 mg) nightly as needed for anxiety (alcohol withdrawal symptoms (tremor, anxiety, palpitations)).     Scribe Disclosure:  I, Roverto Cintron, am serving as a scribe at 10:20 PM on 3/26/2021 to document services personally performed by Krish Menezes MD based on my observations and the provider's statements to me.      Krish Menezes MD  03/27/21 1922

## 2021-03-30 ENCOUNTER — TELEPHONE (OUTPATIENT)
Dept: MEDSURG UNIT | Facility: CLINIC | Age: 66
End: 2021-03-30

## 2021-04-05 ENCOUNTER — COMMUNICATION - HEALTHEAST (OUTPATIENT)
Dept: SCHEDULING | Facility: CLINIC | Age: 66
End: 2021-04-05

## 2021-04-06 ENCOUNTER — TELEPHONE (OUTPATIENT)
Dept: MEDSURG UNIT | Facility: CLINIC | Age: 66
End: 2021-04-06

## 2021-04-06 NOTE — TELEPHONE ENCOUNTER
Voicemail left for patient about arrival time for procedure on 4/8/21 @ 09:30. Instructions for patient to arrive at 09:00 provided. Callback number left for questions.

## 2021-04-08 ENCOUNTER — HOSPITAL ENCOUNTER (OUTPATIENT)
Dept: ULTRASOUND IMAGING | Facility: CLINIC | Age: 66
End: 2021-04-08
Attending: HOSPITALIST
Payer: MEDICARE

## 2021-04-08 ENCOUNTER — HOSPITAL ENCOUNTER (OUTPATIENT)
Facility: CLINIC | Age: 66
Discharge: NURSING, REHAB OR LONG-TERM CARE | End: 2021-04-08
Admitting: RADIOLOGY
Payer: MEDICARE

## 2021-04-08 VITALS
HEART RATE: 73 BPM | RESPIRATION RATE: 16 BRPM | OXYGEN SATURATION: 97 % | TEMPERATURE: 96.9 F | SYSTOLIC BLOOD PRESSURE: 135 MMHG | DIASTOLIC BLOOD PRESSURE: 60 MMHG

## 2021-04-08 DIAGNOSIS — K70.31 ALCOHOLIC CIRRHOSIS OF LIVER WITH ASCITES (H): ICD-10-CM

## 2021-04-08 PROCEDURE — 999N000154 HC STATISTIC RADIOLOGY XRAY, US, CT, MAR, NM

## 2021-04-08 PROCEDURE — 272N000706 US PARACENTESIS

## 2021-04-08 PROCEDURE — 49083 ABD PARACENTESIS W/IMAGING: CPT

## 2021-04-08 RX ORDER — LIDOCAINE HYDROCHLORIDE 10 MG/ML
10 INJECTION, SOLUTION EPIDURAL; INFILTRATION; INTRACAUDAL; PERINEURAL ONCE
Status: COMPLETED | OUTPATIENT
Start: 2021-04-08 | End: 2021-04-08

## 2021-04-08 RX ORDER — LIDOCAINE 40 MG/G
CREAM TOPICAL
Status: DISCONTINUED | OUTPATIENT
Start: 2021-04-08 | End: 2021-04-08 | Stop reason: HOSPADM

## 2021-04-08 RX ORDER — ALBUMIN (HUMAN) 12.5 G/50ML
12.5 SOLUTION INTRAVENOUS ONCE
Status: DISCONTINUED | OUTPATIENT
Start: 2021-04-08 | End: 2021-04-08 | Stop reason: HOSPADM

## 2021-04-08 RX ADMIN — LIDOCAINE HYDROCHLORIDE 10 ML: 10 INJECTION, SOLUTION EPIDURAL; INFILTRATION; INTRACAUDAL; PERINEURAL at 11:25

## 2021-04-08 NOTE — PROGRESS NOTES
Care Suites Admission Nursing Note    Patient Information  Name: Jim Richard  Age: 66 year old  Reason for admission: Jim Chakraborty Suites arrival time: 1010      Patient Admission/Assessment   Pre-procedure assessment complete: Yes  If abnormal assessment/labs, provider notified: N/A  NPO: N/A  Medications held per instructions/orders: N/A  Consent: deferred  Patient oriented to room: Yes  Education/questions answered: Yes  Plan/other: paracentesis    Discharge Planning  Discharge name/phone number: [facility] -  has number and will call  Overnight post sedation caregiver: rehab center  Discharge location: to rehabilitation    Danielle Cook RN     PATIENT/VISITOR WELLNESS SCREENING    Step 1 Patient Screening    1. In the last month, have you been in contact with someone who was confirmed or suspected to have Coronavirus/COVID-19? No    2. Do you have the following symptoms?  Fever/Chills? No   Cough? No   Shortness of breath? No   New loss of taste or smell? No  Sore throat? No  Muscle or body aches? No  Headaches? No  Fatigue? No  Vomiting or diarrhea? No      Step 3 Refer to logic grid below for actions    NO SYMPTOM(S)    ACTIONS:  1. Standard rooming process  2. Provider to assess per normal protocol  3. Implement precautions as needed and per guidelines     POSITIVE SYMPTOM(S)  If positive for ANY of the following symptoms: fever, cough, shortness of breath, rash    ACTION:  1. Continue to have the patient wear a mask   2. Room patient as soon as possible  3. Don appropriate PPE when entering room  4. Provider evaluation

## 2021-04-08 NOTE — DISCHARGE INSTRUCTIONS

## 2021-04-08 NOTE — PROGRESS NOTES
Care Suites Procedure Nursing Note    Patient Information  Name: Jim Richard  Age: 66 year old    Procedure  Procedure: Paracentesis  Procedure start time: 1116  Procedure complete time: 1143  Concerns/abnormal assessment: NA  If abnormal assessment, provider notified: N/A  Plan/Other: Proceed as planned.    1118 Time Out done.  Consent signed    Paracentesis: Pt tolerated well. VSS.  4300 cc clear yellow fluid removed from abdomen w/o difficulty. Bandaid applied to site - CDI. No Albumin given per protocol.     1145 Pt back to Care Suites.      Jovani Simons RN   Care Suites Post Procedure Note    Patient Information  Name: Jim Richard  Age: 66 year old    Post Procedure  Time patient returned to Care Suites: 1145  Concerns/abnormal assessment: No immediate  If abnormal assessment, provider notified: N/A  Plan/Other: Continue post procedure plan of care.    Right paracentesis site band aid CDI.  VS stable. Taking po fluids well.  Anticipate discharge in 30 min.      Jovani Simons RN   Care Suites Discharge Nursing Note    Patient Information  Name: Jim Richard  Age: 66 year old    Discharge Education:  Discharge instructions reviewed: Yes  Additional education/resources provided: NA  Patient/patient representative verbalizes understanding: Yes  Patient discharging on new medications: N/A  Medication education completed: N/A    Discharge Plans:   Discharge location: to rehabilitation  Discharge ride contacted: Yes  Approximate discharge time:   [id number]  Pt called his own transportation.    Discharge Criteria:  Discharge criteria met and vital signs stable: Yes    Patient Belongs:  Patient belongings returned to patient: Yes    Jovani Simons RN

## 2021-04-23 ENCOUNTER — TELEPHONE (OUTPATIENT)
Dept: MEDSURG UNIT | Facility: CLINIC | Age: 66
End: 2021-04-23

## 2021-04-23 DIAGNOSIS — Z11.59 ENCOUNTER FOR SCREENING FOR OTHER VIRAL DISEASES: ICD-10-CM

## 2021-04-23 DIAGNOSIS — Z20.822 ENCOUNTER FOR LABORATORY TESTING FOR COVID-19 VIRUS: Primary | ICD-10-CM

## 2021-04-23 PROCEDURE — U0003 INFECTIOUS AGENT DETECTION BY NUCLEIC ACID (DNA OR RNA); SEVERE ACUTE RESPIRATORY SYNDROME CORONAVIRUS 2 (SARS-COV-2) (CORONAVIRUS DISEASE [COVID-19]), AMPLIFIED PROBE TECHNIQUE, MAKING USE OF HIGH THROUGHPUT TECHNOLOGIES AS DESCRIBED BY CMS-2020-01-R: HCPCS | Performed by: FAMILY MEDICINE

## 2021-04-23 PROCEDURE — U0005 INFEC AGEN DETEC AMPLI PROBE: HCPCS | Performed by: FAMILY MEDICINE

## 2021-04-23 NOTE — TELEPHONE ENCOUNTER
Pre-Procedure COVID Test Results Pending    Results Reviewed  The patient has a pending COVID test result.  The patient has a COVID test scheduled on 4/23/21 @ 1410.     No COVID pre-call needed. RN to verify test results prior to procedure.     ABDIRASHID Cook RN

## 2021-04-24 LAB
SARS-COV-2 RNA RESP QL NAA+PROBE: NORMAL
SPECIMEN SOURCE: NORMAL

## 2021-04-25 LAB
LABORATORY COMMENT REPORT: NORMAL
SARS-COV-2 RNA RESP QL NAA+PROBE: NEGATIVE
SPECIMEN SOURCE: NORMAL

## 2021-04-26 ENCOUNTER — HOSPITAL ENCOUNTER (OUTPATIENT)
Dept: ULTRASOUND IMAGING | Facility: CLINIC | Age: 66
End: 2021-04-26
Attending: FAMILY MEDICINE
Payer: MEDICARE

## 2021-04-26 ENCOUNTER — HOSPITAL ENCOUNTER (OUTPATIENT)
Facility: CLINIC | Age: 66
Discharge: HOME | End: 2021-04-26
Admitting: RADIOLOGY
Payer: MEDICARE

## 2021-04-26 VITALS
RESPIRATION RATE: 16 BRPM | SYSTOLIC BLOOD PRESSURE: 160 MMHG | OXYGEN SATURATION: 98 % | DIASTOLIC BLOOD PRESSURE: 68 MMHG | HEART RATE: 81 BPM | TEMPERATURE: 97.6 F

## 2021-04-26 DIAGNOSIS — K70.31 ASCITES DUE TO ALCOHOLIC CIRRHOSIS (H): ICD-10-CM

## 2021-04-26 LAB — PLATELET # BLD AUTO: 151 10E9/L (ref 150–450)

## 2021-04-26 PROCEDURE — 999N000154 HC STATISTIC RADIOLOGY XRAY, US, CT, MAR, NM

## 2021-04-26 PROCEDURE — 250N000011 HC RX IP 250 OP 636: Performed by: PHYSICIAN ASSISTANT

## 2021-04-26 PROCEDURE — 272N000706 US PARACENTESIS

## 2021-04-26 PROCEDURE — P9047 ALBUMIN (HUMAN), 25%, 50ML: HCPCS | Performed by: PHYSICIAN ASSISTANT

## 2021-04-26 PROCEDURE — 85049 AUTOMATED PLATELET COUNT: CPT

## 2021-04-26 PROCEDURE — 36415 COLL VENOUS BLD VENIPUNCTURE: CPT

## 2021-04-26 RX ORDER — LIDOCAINE 40 MG/G
CREAM TOPICAL
Status: DISCONTINUED | OUTPATIENT
Start: 2021-04-26 | End: 2021-04-26 | Stop reason: HOSPADM

## 2021-04-26 RX ORDER — ALBUMIN (HUMAN) 12.5 G/50ML
12.5 SOLUTION INTRAVENOUS ONCE
Status: CANCELLED | OUTPATIENT
Start: 2021-04-26 | End: 2021-04-26

## 2021-04-26 RX ORDER — NICOTINE POLACRILEX 4 MG
15-30 LOZENGE BUCCAL
Status: DISCONTINUED | OUTPATIENT
Start: 2021-04-26 | End: 2021-04-26 | Stop reason: HOSPADM

## 2021-04-26 RX ORDER — LIDOCAINE 40 MG/G
CREAM TOPICAL
Status: CANCELLED | OUTPATIENT
Start: 2021-04-26

## 2021-04-26 RX ORDER — LIDOCAINE HYDROCHLORIDE 10 MG/ML
10 INJECTION, SOLUTION EPIDURAL; INFILTRATION; INTRACAUDAL; PERINEURAL ONCE
Status: COMPLETED | OUTPATIENT
Start: 2021-04-26 | End: 2021-04-26

## 2021-04-26 RX ORDER — ALBUMIN (HUMAN) 12.5 G/50ML
12.5 SOLUTION INTRAVENOUS ONCE
Status: COMPLETED | OUTPATIENT
Start: 2021-04-26 | End: 2021-04-26

## 2021-04-26 RX ORDER — NICOTINE POLACRILEX 4 MG
15-30 LOZENGE BUCCAL
Status: CANCELLED | OUTPATIENT
Start: 2021-04-26

## 2021-04-26 RX ORDER — DEXTROSE MONOHYDRATE 25 G/50ML
25-50 INJECTION, SOLUTION INTRAVENOUS
Status: CANCELLED | OUTPATIENT
Start: 2021-04-26

## 2021-04-26 RX ORDER — DEXTROSE MONOHYDRATE 25 G/50ML
25-50 INJECTION, SOLUTION INTRAVENOUS
Status: DISCONTINUED | OUTPATIENT
Start: 2021-04-26 | End: 2021-04-26 | Stop reason: HOSPADM

## 2021-04-26 RX ADMIN — LIDOCAINE HYDROCHLORIDE 10 ML: 10 INJECTION, SOLUTION EPIDURAL; INFILTRATION; INTRACAUDAL; PERINEURAL at 14:10

## 2021-04-26 RX ADMIN — ALBUMIN HUMAN 12.5 G: 0.25 SOLUTION INTRAVENOUS at 15:12

## 2021-04-26 NOTE — PROGRESS NOTES
RADIOLOGY PROCEDURE NOTE  Patient name: Jim Richard  MRN: [id number]  : 1955    Pre-procedure diagnosis: Ascites  Post-procedure diagnosis: Same    Procedure Date/Time: 2021  3:22 PM  Procedure: Paracentesis  Estimated blood loss: None  Specimen(s) collected with description: Ascites.  The patient tolerated the procedure well with no immediate complications.    See imaging dictation for procedural details and findings.    Provider name: Sebastien Pereira MD  Assistant(s):None

## 2021-04-26 NOTE — PROGRESS NOTES
Care Suites Discharge Nursing Note    Patient Information  Name: Jim Richard  Age: 66 year old    Discharge Education:  Discharge instructions reviewed: Yes  Additional education/resources provided: NA  Patient/patient representative verbalizes understanding: Yes  Patient discharging on new medications: No  Medication education completed: N/A    Discharge Plans:   Discharge location: home  Discharge ride contacted: N/A  Approximate discharge time: 1535    Discharge Criteria:  Discharge criteria met and vital signs stable: Yes    Patient Belongs:  Patient belongings returned to patient: Yes    Shannan Moscoso RN

## 2021-04-26 NOTE — PROGRESS NOTES
Care Suites Admission Nursing Note    Patient Information  Name: Jim Richard  Age: 66 year old  Reason for admission: paracentesis  Care Suites arrival time: 1245    Patient Admission/Assessment   Pre-procedure assessment complete: Yes  If abnormal assessment/labs, provider notified: N/A  NPO: N/A  Medications held per instructions/orders: N/A  Consent: deferred  If applicable, pregnancy test status: deferred  Patient oriented to room: Yes  Education/questions answered: Yes  Plan/other:     Discharge Planning  Discharge name/phone number: No sedation, pt drove self  Overnight post sedation caregiver: NA no sedation  Discharge location: home    Shannan Moscoso RN

## 2021-04-26 NOTE — PROGRESS NOTES
Care Suites Procedure Nursing Note    Patient Information  Name: Jim Richard  Age: 66 year old    Procedure  Procedure: Paracentesis  Procedure start time: 14:10  Procedure complete time: 14:36  Concerns/abnormal assessment: None  Pt tolerated well. VSS.   7,700 cc yellow fluid removed from abdomen w/o difficulty.   Bandaid applied to site - CDI.   Pt back to Care Suites. Detailed report given to Ludwin RN.

## 2021-04-26 NOTE — PROGRESS NOTES
Care Suites Post Procedure Note    Patient Information  Name: Jim Richard  Age: 66 year old    Post Procedure  Time patient returned to Care Suites: 1440  Concerns/abnormal assessment: >6.5L pulled, albumin to be administered  If abnormal assessment, provider notified: N/A  Plan/Other: IV, Albumin and discharge instructions     Shannan Moscoso RN

## 2021-04-26 NOTE — DISCHARGE INSTRUCTIONS

## 2021-04-27 ENCOUNTER — HOSPITAL ENCOUNTER (OUTPATIENT)
Facility: CLINIC | Age: 66
End: 2021-04-27
Payer: MEDICARE

## 2021-04-27 ENCOUNTER — NURSE TRIAGE (OUTPATIENT)
Dept: NURSING | Facility: CLINIC | Age: 66
End: 2021-04-27

## 2021-04-27 DIAGNOSIS — Z11.59 ENCOUNTER FOR SCREENING FOR OTHER VIRAL DISEASES: ICD-10-CM

## 2021-04-27 NOTE — TELEPHONE ENCOUNTER
"Triage Call:    - calling triage nurse concerned as patient is confused and having slurred speech.  -Patient took 100 mg of Hydroxyzine last night.   -Patient is only to have 50 mg at bedtime.  -Patient states he is very confused, which is not baseline for him.   -RN asked if there is any alcohol in his system. Patient stated, \"YES.\" Patient states he had a Pint of Vodka.  -Patient had a paracentesis yesterday.  -No breathing concerns per patient statement.   -Patient had a paracentesis yesterday.  -Patient is having slurred speech on the phone with RN, and is slow to respond to RN's questions. RN is concerned about patient.    -Per protocol, recommendations are for patient to call 911. Patient refuses to call 911. RN educated patient on the importance of calling 911. Patient was okay with RN calling for him. Patient stated he is at home currently and RN confirmed address. RN advised that someone will come to assess patient and make sure he is okay, safe and stable. Patient verbalized understanding and agrees with plan.     -RN called 911 for patient. RN spoke with  who will send someone out to check on patient. Dispatcher wanted call back number, RN gave nurse line number and stated if they have any questions to refer to RN documentation.     Kelle Horton RN, BSN Nurse Triage Advisor 4:57 PM 4/27/2021     Reason for Disposition    [1] Difficult to awaken or acting confused (e.g., disoriented, slurred speech) AND [2] present now AND [3] new onset     Confusion and Slurred Speech    Difficult to awaken or acting confused (e.g., disoriented, slurred speech)     Confusion and Slurred Speech    Additional Information    Negative: [1] Difficult to awaken or acting confused (e.g., disoriented, slurred speech) AND [2] present now AND [3] has diabetes (diabetes mellitus)    Negative: Coma (e.g., not moving, not talking, not responding to stimuli)    Protocols used: CONFUSION - DELIRIUM-A-AH, " ALCOHOL ABUSE AND [id number]    COVID 19 Nurse Triage Plan/Patient Instructions    Please be aware that novel coronavirus (COVID-19) may be circulating in the community. If you develop symptoms such as fever, cough, or SOB or if you have concerns about the presence of another infection including coronavirus (COVID-19), please contact your health care provider or visit [email or web address].     Disposition/Instructions    Call to EMS/911 recommended. Follow protocol based instructions.     Bring Your Own Device:  Please also bring your smart device(s) (smart phones, tablets, laptops) and their charging cables for your personal use and to communicate with your care team during your visit.    Thank you for taking steps to prevent the spread of this virus.  o Limit your contact with others.  o Wear a simple mask to cover your cough.  o Wash your hands well and often.    Resources    [facility]: About COVID-19: [email or web address]    CDC: What to Do If You're Sick: www.cdc.gov/coronavirus/2019-ncov/about/steps-when-sick.html    CDC: Ending Home Isolation: www.cdc.gov/coronavirus/2019-ncov/hcp/disposition-in-home-patients.html     CDC: Caring for Someone: www.cdc.gov/coronavirus/2019-ncov/if-you-are-sick/care-for-someone.html     [facility]: Interim Guidance for Hospital Discharge to Home: www.health.[facility].[place].us/diseases/coronavirus/hcp/hospdischarge.pdf    [facility] clinical trials (COVID-19 research studies): [email or web address]     Below are the COVID-19 hotlines at the [place] Department of [facility] ([facility]). Interpreters are available.   o For health questions: Call 930-877-7966 or 1-431.107.9753 (7 a.m. to 7 p.m.)  o For questions about schools and childcare: Call 147-421-9146 or 1-269.740.3397 (7 a.m. to 7 p.m.)

## 2021-04-29 ENCOUNTER — TELEPHONE (OUTPATIENT)
Dept: MEDSURG UNIT | Facility: CLINIC | Age: 66
End: 2021-04-29

## 2021-04-29 NOTE — TELEPHONE ENCOUNTER
Pre-Procedure Negative COVID Test Results    Results Reviewed  The patient has a negative COVID test result within the required timeframe for the scheduled procedure.     No COVID pre-call needed. Called patient to inform of arrival time of 10:00 AM on 5/3/21. Patient verbalized understanding.    ABDIRASHID Cook RN

## 2021-05-01 ENCOUNTER — NURSE TRIAGE (OUTPATIENT)
Dept: NURSING | Facility: CLINIC | Age: 66
End: 2021-05-01

## 2021-05-01 ENCOUNTER — HOSPITAL ENCOUNTER (EMERGENCY)
Facility: CLINIC | Age: 66
Discharge: HOME | End: 2021-05-01
Attending: EMERGENCY MEDICINE | Admitting: EMERGENCY MEDICINE
Payer: MEDICARE

## 2021-05-01 VITALS
OXYGEN SATURATION: 97 % | WEIGHT: 190 LBS | DIASTOLIC BLOOD PRESSURE: 82 MMHG | HEART RATE: 81 BPM | RESPIRATION RATE: 20 BRPM | TEMPERATURE: 98.5 F | HEIGHT: 67 IN | BODY MASS INDEX: 29.82 KG/M2 | SYSTOLIC BLOOD PRESSURE: 165 MMHG

## 2021-05-01 DIAGNOSIS — K70.11 ALCOHOLIC HEPATITIS WITH ASCITES (H): ICD-10-CM

## 2021-05-01 LAB
ANION GAP SERPL CALCULATED.3IONS-SCNC: 6 MMOL/L (ref 3–14)
APTT PPP: 37 SEC (ref 22–37)
BASOPHILS # BLD AUTO: 0.1 10E9/L (ref 0–0.2)
BASOPHILS NFR BLD AUTO: 0.7 %
BUN SERPL-MCNC: 34 MG/DL (ref 7–30)
CALCIUM SERPL-MCNC: 7.9 MG/DL (ref 8.5–10.1)
CHLORIDE SERPL-SCNC: 113 MMOL/L (ref 94–109)
CO2 SERPL-SCNC: 22 MMOL/L (ref 20–32)
CREAT SERPL-MCNC: 1.58 MG/DL (ref 0.66–1.25)
DIFFERENTIAL METHOD BLD: ABNORMAL
EOSINOPHIL # BLD AUTO: 0 10E9/L (ref 0–0.7)
EOSINOPHIL NFR BLD AUTO: 0 %
ERYTHROCYTE [DISTWIDTH] IN BLOOD BY AUTOMATED COUNT: 17.2 % (ref 10–15)
ETHANOL SERPL-MCNC: <0.01 G/DL
GFR SERPL CREATININE-BSD FRML MDRD: 45 ML/MIN/{1.73_M2}
GLUCOSE BLDC GLUCOMTR-MCNC: 73 MG/DL (ref 70–99)
GLUCOSE SERPL-MCNC: 77 MG/DL (ref 70–99)
GRAM STN SPEC: NORMAL
HCT VFR BLD AUTO: 25.1 % (ref 40–53)
HGB BLD-MCNC: 8 G/DL (ref 13.3–17.7)
IMM GRANULOCYTES # BLD: 0 10E9/L (ref 0–0.4)
IMM GRANULOCYTES NFR BLD: 0.4 %
INR PPP: 1.2 (ref 0.86–1.14)
LYMPHOCYTES # BLD AUTO: 0.3 10E9/L (ref 0.8–5.3)
LYMPHOCYTES NFR BLD AUTO: 3.6 %
Lab: NORMAL
MCH RBC QN AUTO: 29.9 PG (ref 26.5–33)
MCHC RBC AUTO-ENTMCNC: 31.9 G/DL (ref 31.5–36.5)
MCV RBC AUTO: 94 FL (ref 78–100)
MONOCYTES # BLD AUTO: 0.5 10E9/L (ref 0–1.3)
MONOCYTES NFR BLD AUTO: 6 %
NEUTROPHILS # BLD AUTO: 6.8 10E9/L (ref 1.6–8.3)
NEUTROPHILS NFR BLD AUTO: 89.3 %
NRBC # BLD AUTO: 0 10*3/UL
NRBC BLD AUTO-RTO: 0 /100
PLATELET # BLD AUTO: 208 10E9/L (ref 150–450)
POTASSIUM SERPL-SCNC: 4 MMOL/L (ref 3.4–5.3)
RBC # BLD AUTO: 2.68 10E12/L (ref 4.4–5.9)
SODIUM SERPL-SCNC: 141 MMOL/L (ref 133–144)
SPECIMEN SOURCE: NORMAL
WBC # BLD AUTO: 7.6 10E9/L (ref 4–11)

## 2021-05-01 PROCEDURE — 999N001017 HC STATISTIC GLUCOSE BY METER IP

## 2021-05-01 PROCEDURE — 87015 SPECIMEN INFECT AGNT CONCNTJ: CPT | Performed by: EMERGENCY MEDICINE

## 2021-05-01 PROCEDURE — 250N000011 HC RX IP 250 OP 636: Performed by: EMERGENCY MEDICINE

## 2021-05-01 PROCEDURE — 82077 ASSAY SPEC XCP UR&BREATH IA: CPT | Performed by: EMERGENCY MEDICINE

## 2021-05-01 PROCEDURE — 96365 THER/PROPH/DIAG IV INF INIT: CPT | Mod: 59

## 2021-05-01 PROCEDURE — 85610 PROTHROMBIN TIME: CPT | Performed by: EMERGENCY MEDICINE

## 2021-05-01 PROCEDURE — 80048 BASIC METABOLIC PNL TOTAL CA: CPT | Performed by: EMERGENCY MEDICINE

## 2021-05-01 PROCEDURE — 89051 BODY FLUID CELL COUNT: CPT | Performed by: EMERGENCY MEDICINE

## 2021-05-01 PROCEDURE — 85730 THROMBOPLASTIN TIME PARTIAL: CPT | Performed by: EMERGENCY MEDICINE

## 2021-05-01 PROCEDURE — 96375 TX/PRO/DX INJ NEW DRUG ADDON: CPT

## 2021-05-01 PROCEDURE — 99292 CRITICAL CARE ADDL 30 MIN: CPT

## 2021-05-01 PROCEDURE — P9047 ALBUMIN (HUMAN), 25%, 50ML: HCPCS | Performed by: EMERGENCY MEDICINE

## 2021-05-01 PROCEDURE — 87070 CULTURE OTHR SPECIMN AEROBIC: CPT | Performed by: EMERGENCY MEDICINE

## 2021-05-01 PROCEDURE — 85025 COMPLETE CBC W/AUTO DIFF WBC: CPT | Performed by: EMERGENCY MEDICINE

## 2021-05-01 PROCEDURE — 87205 SMEAR GRAM STAIN: CPT | Performed by: EMERGENCY MEDICINE

## 2021-05-01 PROCEDURE — 99291 CRITICAL CARE FIRST HOUR: CPT | Mod: 25

## 2021-05-01 PROCEDURE — 49082 ABD PARACENTESIS: CPT

## 2021-05-01 RX ORDER — ONDANSETRON 2 MG/ML
4 INJECTION INTRAMUSCULAR; INTRAVENOUS ONCE
Status: COMPLETED | OUTPATIENT
Start: 2021-05-01 | End: 2021-05-01

## 2021-05-01 RX ORDER — ALBUMIN (HUMAN) 12.5 G/50ML
12.5 SOLUTION INTRAVENOUS ONCE
Status: COMPLETED | OUTPATIENT
Start: 2021-05-01 | End: 2021-05-01

## 2021-05-01 RX ORDER — ONDANSETRON 2 MG/ML
4 INJECTION INTRAMUSCULAR; INTRAVENOUS ONCE
Status: DISCONTINUED | OUTPATIENT
Start: 2021-05-01 | End: 2021-05-01 | Stop reason: HOSPADM

## 2021-05-01 RX ADMIN — ALBUMIN HUMAN 12.5 G: 0.25 SOLUTION INTRAVENOUS at 14:33

## 2021-05-01 RX ADMIN — ONDANSETRON 4 MG: 2 INJECTION INTRAMUSCULAR; INTRAVENOUS at 13:55

## 2021-05-01 ASSESSMENT — ENCOUNTER SYMPTOMS
NERVOUS/ANXIOUS: 1
SHORTNESS OF BREATH: 1
ABDOMINAL DISTENTION: 1
ABDOMINAL PAIN: 1

## 2021-05-01 ASSESSMENT — MIFFLIN-ST. JEOR: SCORE: 1595.46

## 2021-05-01 NOTE — DISCHARGE INSTRUCTIONS
We have taken approximately 6 L off of your abdomen.  We have replaced some albumin.  Follow-up with your regular doctor for reassessment and your needs for paracentesis.  Return with fever or worsening condition.  The nurse manager that cares for you has been concerned about your living independently.  Return if you change your mind about being admitted to a TCU.

## 2021-05-01 NOTE — ED NOTES
Bed: ED23  Expected date:   Expected time:   Means of arrival:   Comments:  [facility] [place] - 426 - 66 M diff breathing etoh withdrawal eta 1152

## 2021-05-01 NOTE — ED TRIAGE NOTES
Increase diff breathing last couple days - pt has hx of liver failure ascites - pt reports needing to be tap had schedule appointment on Monday - pt reports some diarrhea started today - 1-2 day drinking binge last drank yesterday - pt is tremulous

## 2021-05-01 NOTE — ED PROVIDER NOTES
History   Chief Complaint:  Respiratory Distress and Alcohol Intoxication     The history is provided by the patient.      Jim Richard is a 66 year old male with history of COPD, coronary artery disease, myocardial infarction, hepatomegaly, hypertension, hyperlipidemia, alcoholic cirrhosis with ascites, and subdural hematoma who presents with respiratory distress and alcohol intoxication. The patient reports that he has had frequent paracenteses, the most recent on 04/26 where 7.7 liters of fluid was drained and albumin was given. He notes that his abdomen is distended and uncomfortable causing him to have difficulty breathing. His last drink was yesterday afternoon and was going to try to wait until Monday to be seen, however, he is now becoming anxious with how distended the abdomen is. Denies use of blood thinners.     Review of Systems   Respiratory: Positive for shortness of breath.    Gastrointestinal: Positive for abdominal distention and abdominal pain.   Psychiatric/Behavioral: The patient is nervous/anxious.    All other systems reviewed and are negative.    Allergies:  Amlodipine  Lisinopril    Medications:  Desyrel  Trintellix  Atarax  Remeron  Seroquel  Lasix  Flomax  Aldactone  Klor-Con M  Protonix  Robaxin  Mag-Ox  Augmentin    Past Medical History:    Alcoholism   Anxiety   COPD  Coronary artery disease   Depression   Esophageal varices with bleeding   Heart attack   Hepatomegaly   Hyperlipemia   Hypertension   JANIS on CPAP   Peripheral vascular disease    PVD  Spinal stenosis   Substance abuse   Lactic acidosis  Frequent falls   Alcoholic cirrhosis with ascites  GI bleed  Umbilical hernia  Spinal stenosis  Subdural hematoma    Past Surgical History:    Appendectomy  Graft femoropopliteal  Colonoscopy  Endarterectomy femoral  EGD  Abdominal hernia repair  Laminectomy, fusion lumbar three level  T and A    Family History:    Coronary artery disease  Substance abuse  Lung cancer  Mental  "illness  Lymphoma     Social History:  Substance abuse. Presents alone.     Physical Exam     Patient Vitals for the past 24 hrs:   BP Temp Temp src Pulse Resp SpO2 Height Weight   05/01/21 1513 (!) 165/82 -- -- 81 -- 97 % -- --   05/01/21 1512 -- -- -- -- -- 97 % -- --   05/01/21 1200 (!) 174/92 98.5  F (36.9  C) Oral 87 20 97 % 1.702 m (5' 7\") 86.2 kg (190 lb)       Physical Exam  Vitals signs and nursing note reviewed.   HENT:      Head: Normocephalic and atraumatic.      Right Ear: Tympanic membrane normal.   Cardiovascular:      Rate and Rhythm: Normal rate and regular rhythm.   Pulmonary:      Effort: Pulmonary effort is normal.      Breath sounds: Normal breath sounds.   Abdominal:      Comments: Prominent periumbilical hernia with tense ascites.  Minimal tenderness no guarding or rebound   Musculoskeletal: Normal range of motion.   Skin:     General: Skin is warm.      Capillary Refill: Capillary refill takes less than 2 seconds.   Neurological:      General: No focal deficit present.      Mental Status: He is alert and oriented to person, place, and time.   Psychiatric:      Comments: Patient admits to drinking yesterday.  Patient denies suicidal or homicidal ideation.  Patient states he can care for himself and is requesting to go home after draining his ascites.         Emergency Department Course   Laboratory:  CBC: WBC 7.6, HGB 8.0(L),    BMP: Chloride 113(H), BUN 34(H), Creatinine 1.58(H), GFR 45(L), Calcium 7.9(L) o/w WNL  INR: 1.20(H)  PTT: 37  Alcohol level: <0.01  Cell count with differential fluid: WNL  Fluid Culture Aerobic Bacterial: In process  Gram stain: Negative       Paracentesis with Ultrasound Guidance     PERFORMED BY: .    SITE: right lower abdomen.    INDICATION:  Tense ascites.    CONSENT: Obtained from Patient.     ANESTHESIA: lidocaine 1% 10ml    NOTE:  Using sterile technique the site was prepped with above anesthetic. The abdomen was entered and 30 cc's of yellow " colored fluid was withdrawn and sent for laboratory analysis, and 6 liters of yellow ascites were removed.     COMPLICATIONS: Patient tolerated the procedure well with no immediate complications.    POST-PROCEDURE INSTRUCTIONS: The patient is asked to continue to rest the joint for a few more days before resuming regular activities.  It may be more painful for the first 1-2 days.  Watch for fever, or increased swelling or persistent pain in the joint.      Emergency Department Course:    Reviewed:  I reviewed nursing notes, vitals, past medical history and care everywhere    Assessments:  1219 I obtained history and examined the patient as noted above.   1308 I performed a paracentesis with the guidance of a bedside ultrasound.     Interventions:  1355 Zofran 4 mg Oral  1433 Albumin human 25% 12.5 g IV    Disposition:  The patient was discharged to home.     Impression & Plan   CMS Diagnoses: None    Medical Decision Making:  Patient presents with shortness of breath associated with abdominal distention and ascites.  Patient is awake and alert with a normal respiratory rate abdomen is tensely distended.  Care was discussed with the patient ultimately bedside paracentesis was performed by me patient signed consent procedure went without complication due to complaints abdominal pain cell counts were sent to rule out SBP this was negative.  Over 6 L of ascites were drained.  Patient tolerated procedure well.  Patient's care plan states that he is having difficulty caring for himself at home and recommend TCU.  Care was discussed with the patient who refuses TCU at this time and would like to go home via cab.  I am unable to place this patient on a hold and admit him to TCU and therefore patient was discharged in stable condition.    Covid-19  Jim CALDERON [place] was evaluated during a global COVID-19 pandemic, which necessitated consideration that the patient might be at risk for infection with the SARS-CoV-2 virus that  causes COVID-19.   Applicable protocols for evaluation were followed during the patient's care.     Diagnosis:    ICD-10-CM    1. Alcoholic hepatitis with ascites  K70.11 Cell count with differential fluid     Glucose by meter     Glucose by meter       Discharge Medications:  New Prescriptions    No medications on file       Scribe Disclosure:  Shanna VALENTE, am serving as a scribe at 12:04 PM on 5/1/2021 to document services personally performed by Troy Deng MD based on my observations and the provider's statements to me.              Troy Deng MD  05/02/21 0707

## 2021-05-01 NOTE — TELEPHONE ENCOUNTER
Patient calling -says he was discharged from hospital today.  Says they took 6 liter of fluid off him.  He had a small episode of diarrhea before leaving the hospital.  He has had two since returning home.  Says he was weak in the hospital  No vomiting.  No blood.  No fever.    Triaged to disposition of Home care.  Advised patient to call back or return to ED if symptoms worsen.    Pricilla Rahman RN  Triage Nurse Advisor      Additional Information    Negative: Shock suspected (e.g., cold/pale/clammy skin, too weak to stand, low BP, rapid pulse)    Negative: Difficult to awaken or acting confused (e.g., disoriented, slurred speech)    Negative: Sounds like a life-threatening emergency to the triager    Negative: Vomiting also present and worse than the diarrhea    Negative: [1] Blood in stool AND [2] without diarrhea    Negative: Diarrhea in a cancer patient who is currently (or recently) receiving chemotherapy or radiation therapy, or cancer patient who has metastatic or end-stage cancer and is receiving palliative care    Negative: [1] SEVERE abdominal pain (e.g., excruciating) AND [2] present > 1 hour    Negative: [1] SEVERE abdominal pain AND [2] age > 60    Negative: [1] Blood in the stool AND [2] moderate or large amount of blood    Negative: Black or tarry bowel movements  (Exception: chronic-unchanged  black-grey bowel movements AND is taking iron pills or Pepto-bismol)    Negative: [1] Drinking very little AND [2] dehydration suspected (e.g., no urine > 12 hours, very dry mouth, very lightheaded)    Negative: Patient sounds very sick or weak to the triager    Negative: [1] SEVERE diarrhea (e.g., 7 or more times / day more than normal) AND [2] age > 60 years    Negative: [1] Constant abdominal pain AND [2] present > 2 hours    Negative: [1] Fever > 103 F (39.4 C) AND [2] not able to get the fever down using Fever Care Advice    Negative: [1] SEVERE diarrhea (e.g., 7 or more times / day more than normal) AND  [2] present > 24 hours (1 day)    Negative: [1] MODERATE diarrhea (e.g., 4-6 times / day more than normal) AND [2] present > 48 hours (2 days)    Negative: [1] MODERATE diarrhea (e.g., 4-6 times / day more than normal) AND [2] age > 70 years    Negative: Fever > 101 F (38.3 C)    Negative: Fever present > 3 days (72 hours)    Negative: Abdominal pain  (Exception: Pain clears with each passage of diarrhea stool)    Negative: [1] Blood in the stool AND [2] small amount of blood   (Exception: only on toilet paper. Reason: diarrhea can cause rectal irritation with blood on wiping)    Negative: [1] Mucus or pus in stool AND [2] present > 2 days AND [3] diarrhea is more than mild    Negative: [1] Recent antibiotic therapy (i.e., within last 2 months) AND [2] diarrhea present > 3 days since antibiotic was stopped    Negative: [1] Recent hospitalization AND [2] diarrhea present > 3 days    Negative: Weak immune system (e.g., HIV positive, cancer chemo, splenectomy, organ transplant, chronic steroids)    Negative: Tube feedings (e.g., nasogastric, g-tube, j-tube)    Negative: Travel to a foreign country in past month    Negative: Diarrhea is a chronic symptom (recurrent or ongoing AND present > 4 weeks)    Negative: [1] MILD diarrhea (e.g., 1-3 or more stools than normal in past 24 hours) without known cause AND [2] present >  7 days    MILD-MODERATE diarrhea (e.g., 1-6 times / day more than normal)    Negative: Shock suspected (e.g., cold/pale/clammy skin, too weak to stand, low BP, rapid pulse)    Negative: Difficult to awaken or acting confused (e.g., disoriented, slurred speech)    Negative: Sounds like a life-threatening emergency to the triager    Negative: Recent (within last 24 hours) medical visit for an injury    Negative: Recent surgery or surgical procedure    Negative: Recent discharge from the hospital    Negative: Asthma attack diagnosed recently    Negative: Flu (influenza) diagnosed recently    Negative: Ear  infection (otitis media, middle ear infection) diagnosed recently    Negative: Ear infection (otitis externa, swimmer's ear) diagnosed recently    Negative: [1] Sinus infection AND [2] taking an antibiotic    Negative: Skin infection (cellulitis) diagnosed recently    Negative: Strep throat (strep pharyngitis) diagnosed recently    Negative: Threatened miscarriage (threatened ) recently diagnosed    Negative: Urine infection (FEMALE; cystitis, pyelonephritis, urethritis) ) diagnosed recently    Negative: Urine infection (MALE; cystitis, pyelonephritis, prostatitis, epidydimitis, orchitis, urethritis) diagnosed recently    Negative: Taking antibiotic for other infection    Negative: More than 24 hours since medical visit    Negative: [1] Drinking very little AND [2] dehydration suspected (e.g., no urine > 12 hours, very dry mouth, very lightheaded)    Negative: Patient sounds very sick or weak to the triager    Negative: Fever > 104 F (40 C)    [1] Recent medical visit within 24 hours AND [2] condition/symptoms SAME (unchanged) AND [3] caller has additional questions triager can answer    Negative: [1] Recent medical visit within 24 hours AND [2] NEW onset of fever AND [3] HCP said to call if this occurred    Negative: [1] Caller has NON-URGENT question (includes prescribed medication questions) AND [2] triager unable to answer    Negative: [1] SEVERE pain (e.g., excruciating, pain scale 8-10) AND [2] not improved after pain medications    Negative: [1] Recent medical visit within 24 hours AND [2] condition/symptoms WORSE    Negative: [1] Recent medical visit within 24 hours AND [2] NEW symptom AND [3] that could be serious    Negative: [1] Caller has URGENT question (includes prescribed medication questions) AND [2] triager unable to answer question    Protocols used: DIARRHEA-A-, RECENT MEDICAL VISIT FOR ILLNESS FOLLOW-UP CALL-A-

## 2021-05-01 NOTE — ED NOTES
Bedside glucose: 73. Patient given orange juice and sandwich. Patient does not have a ride home or money to pay for a cab. Cab called for patient on our account. Patient shaky and unstable to take bus at this time.

## 2021-05-03 LAB
APPEARANCE FLD: CLEAR
COLOR FLD: YELLOW
LYMPHOCYTES NFR FLD MANUAL: 35 %
MONOS+MACROS NFR FLD MANUAL: 57 %
NEUTS BAND NFR FLD MANUAL: 1 %
OTHER CELLS FLD MANUAL: 7 %
SPECIMEN SOURCE FLD: NORMAL
WBC # FLD AUTO: 126 /UL

## 2021-05-04 DIAGNOSIS — Z11.59 ENCOUNTER FOR SCREENING FOR OTHER VIRAL DISEASES: ICD-10-CM

## 2021-05-04 PROCEDURE — U0005 INFEC AGEN DETEC AMPLI PROBE: HCPCS | Performed by: FAMILY MEDICINE

## 2021-05-04 PROCEDURE — U0003 INFECTIOUS AGENT DETECTION BY NUCLEIC ACID (DNA OR RNA); SEVERE ACUTE RESPIRATORY SYNDROME CORONAVIRUS 2 (SARS-COV-2) (CORONAVIRUS DISEASE [COVID-19]), AMPLIFIED PROBE TECHNIQUE, MAKING USE OF HIGH THROUGHPUT TECHNOLOGIES AS DESCRIBED BY CMS-2020-01-R: HCPCS | Performed by: FAMILY MEDICINE

## 2021-05-05 ENCOUNTER — TELEPHONE (OUTPATIENT)
Dept: MEDSURG UNIT | Facility: CLINIC | Age: 66
End: 2021-05-05

## 2021-05-05 NOTE — TELEPHONE ENCOUNTER
Pre-Procedure Negative COVID Test Results    Results Reviewed  The patient has a negative COVID test result within the required timeframe for the scheduled procedure.     No COVID pre-call needed.     RN called and spoke with patient, updated patient on arrival time of 10:00 AM. Patient verbalized understanding. Patient also asked if it would be OK for him to receive his COVID vaccine, RN informed patinet that he should talk to his provider about getting the vaccine. RN also provided general information that vaccine side effects don't happen for every patient and would not effect any upcoming scheduled appointments for procedure.    ABDIRASHID Cook RN

## 2021-05-06 LAB
BACTERIA SPEC CULT: NO GROWTH
Lab: NORMAL
SPECIMEN SOURCE: NORMAL

## 2021-05-06 NOTE — RESULT ENCOUNTER NOTE
Final fluid culture report is NEGATIVE.    No change in treatment per [facility] ED Lab Result Culture protocol.

## 2021-05-07 ENCOUNTER — HOSPITAL ENCOUNTER (OUTPATIENT)
Facility: CLINIC | Age: 66
Discharge: HOME | End: 2021-05-07
Admitting: RADIOLOGY
Payer: MEDICARE

## 2021-05-07 ENCOUNTER — HOSPITAL ENCOUNTER (OUTPATIENT)
Dept: ULTRASOUND IMAGING | Facility: CLINIC | Age: 66
End: 2021-05-07
Attending: FAMILY MEDICINE
Payer: MEDICARE

## 2021-05-07 VITALS
TEMPERATURE: 98.5 F | RESPIRATION RATE: 18 BRPM | BODY MASS INDEX: 29.82 KG/M2 | SYSTOLIC BLOOD PRESSURE: 146 MMHG | HEART RATE: 77 BPM | WEIGHT: 190 LBS | HEIGHT: 67 IN | DIASTOLIC BLOOD PRESSURE: 77 MMHG | OXYGEN SATURATION: 99 %

## 2021-05-07 DIAGNOSIS — Z11.59 ENCOUNTER FOR SCREENING FOR OTHER VIRAL DISEASES: ICD-10-CM

## 2021-05-07 DIAGNOSIS — K70.31 ASCITES DUE TO ALCOHOLIC CIRRHOSIS (H): ICD-10-CM

## 2021-05-07 PROCEDURE — 999N000154 HC STATISTIC RADIOLOGY XRAY, US, CT, MAR, NM

## 2021-05-07 PROCEDURE — 49083 ABD PARACENTESIS W/IMAGING: CPT

## 2021-05-07 RX ORDER — LIDOCAINE HYDROCHLORIDE 10 MG/ML
10 INJECTION, SOLUTION EPIDURAL; INFILTRATION; INTRACAUDAL; PERINEURAL ONCE
Status: COMPLETED | OUTPATIENT
Start: 2021-05-07 | End: 2021-05-07

## 2021-05-07 RX ADMIN — LIDOCAINE HYDROCHLORIDE 10 ML: 10 INJECTION, SOLUTION EPIDURAL; INFILTRATION; INTRACAUDAL; PERINEURAL at 10:45

## 2021-05-07 ASSESSMENT — MIFFLIN-ST. JEOR: SCORE: 1595.46

## 2021-05-07 NOTE — PROGRESS NOTES
Care Suites Post Procedure Note    Patient Information  Name: Jim Richard  Age: 66 year old    Post Procedure  Time patient returned to Care Suites: 1105  Concerns/abnormal assessment: no  If abnormal assessment, provider notified: N/A  Plan/Other: monitor.  Pt denies any pain . RtLQ site Dsg CDI.Joce liquids.    Brad Mccullough RN       Care Suites Discharge Nursing Note    Patient Information  Name: Jim Richard  Age: 66 year old    Discharge Education:  Discharge instructions reviewed: Yes  Additional education/resources provided: no  Patient/patient representative verbalizes understanding: Yes  Patient discharging on new medications: No  Medication education completed: N/A    Discharge Plans:   Discharge location: home  Discharge ride contacted: No, explain Drives per self  Approximate discharge time: 1130    Discharge Criteria:  Discharge criteria met and vital signs stable: Yes    Patient Belongs:  Patient belongings returned to patient: Yes    Brad Mccullough RN

## 2021-05-07 NOTE — DISCHARGE INSTRUCTIONS

## 2021-05-07 NOTE — PROGRESS NOTES
Care Suites Procedure Nursing Note    Patient Information  Name: Jim Richard  Age: 66 year old    Procedure  Procedure: Paracentesis by Dr Pereira with 4900  Ml of clear yellow returns.  Pt tolerated very well.  Procedure start time: 1046  Procedure complete time: 1100  Concerns/abnormal assessment: none  If abnormal assessment, provider notified: N/A  Plan/Other: Will take back to Care Suites.    Sully Garcia RN

## 2021-05-07 NOTE — PROGRESS NOTES
Care Suites Admission Nursing Note    Patient Information  Name: Jim Richard  Age: 66 year old  Reason for admission: paracentesis  Care Suites arrival time: 1020    Patient Admission/Assessment   Pre-procedure assessment complete: Yes  If abnormal assessment/labs, provider notified: N/A  NPO: Yes  Medications held per instructions/orders: N/A  Consent: deferred  If applicable, pregnancy test status: deferred  Patient oriented to room: Yes  Education/questions answered: Yes  Plan/other: paracentesis      Eli Thapa RN

## 2021-05-07 NOTE — PROGRESS NOTES
PATIENT/VISITOR WELLNESS SCREENING    Step 1 Patient Screening    1. In the last month, have you been in contact with someone who was confirmed or suspected to have Coronavirus/COVID-19? No    2. Do you have the following symptoms?  Fever/Chills? No   Cough? No   Shortness of breath? No   New loss of taste or smell? No  Sore throat? No  Muscle or body aches? No  Headaches? No  Fatigue? No  Vomiting or diarrhea? No      POSITIVE SYMPTOM(S)  If positive for ANY of the following symptoms: fever, cough, shortness of breath, rash    ACTION:  1. Continue to have the patient wear a mask   2. Room patient as soon as possible  3. Don appropriate PPE when entering room  4. Provider evaluation

## 2021-05-10 RX ORDER — ALBUMIN (HUMAN) 12.5 G/50ML
12.5 SOLUTION INTRAVENOUS
Status: CANCELLED | OUTPATIENT
Start: 2021-05-10

## 2021-05-10 RX ORDER — DIPHENHYDRAMINE HYDROCHLORIDE 50 MG/ML
50 INJECTION INTRAMUSCULAR; INTRAVENOUS
Status: CANCELLED
Start: 2021-05-10

## 2021-05-10 RX ORDER — EPINEPHRINE 1 MG/ML
0.3 INJECTION, SOLUTION, CONCENTRATE INTRAVENOUS EVERY 5 MIN PRN
Status: CANCELLED
Start: 2021-05-10

## 2021-05-10 RX ORDER — MEPERIDINE HYDROCHLORIDE 25 MG/ML
25 INJECTION INTRAMUSCULAR; INTRAVENOUS; SUBCUTANEOUS EVERY 30 MIN PRN
Status: CANCELLED
Start: 2021-05-10

## 2021-05-10 RX ORDER — ALBUTEROL SULFATE 90 UG/1
1-2 AEROSOL, METERED RESPIRATORY (INHALATION)
Status: CANCELLED
Start: 2021-05-10

## 2021-05-10 RX ORDER — HEPARIN SODIUM,PORCINE 10 UNIT/ML
5 VIAL (ML) INTRAVENOUS
Status: CANCELLED | OUTPATIENT
Start: 2021-05-10

## 2021-05-10 RX ORDER — HEPARIN SODIUM (PORCINE) LOCK FLUSH IV SOLN 100 UNIT/ML 100 UNIT/ML
5 SOLUTION INTRAVENOUS
Status: CANCELLED | OUTPATIENT
Start: 2021-05-10

## 2021-05-10 RX ORDER — ALBUTEROL SULFATE 5 MG/ML
2.5 SOLUTION RESPIRATORY (INHALATION)
Status: CANCELLED
Start: 2021-05-10

## 2021-05-10 RX ORDER — NALOXONE HYDROCHLORIDE 0.4 MG/ML
0.2 INJECTION, SOLUTION INTRAMUSCULAR; INTRAVENOUS; SUBCUTANEOUS
Status: CANCELLED
Start: 2021-05-10

## 2021-05-12 ENCOUNTER — TELEPHONE (OUTPATIENT)
Dept: MEDSURG UNIT | Facility: CLINIC | Age: 66
End: 2021-05-12

## 2021-05-12 NOTE — TELEPHONE ENCOUNTER
Pre-Procedure Negative COVID Test Results    Results Reviewed  The patient has a negative COVID test result within the required timeframe for the scheduled procedure.     No COVID pre-call needed.     RN called and spoke to patient, informed of arrival time of 10:00 AM. Patient verbalized understanding.    ABDIRASHID Cook RN

## 2021-05-13 ENCOUNTER — HOSPITAL ENCOUNTER (OUTPATIENT)
Dept: ULTRASOUND IMAGING | Facility: CLINIC | Age: 66
End: 2021-05-13
Attending: FAMILY MEDICINE
Payer: MEDICARE

## 2021-05-13 ENCOUNTER — HOSPITAL ENCOUNTER (OUTPATIENT)
Facility: CLINIC | Age: 66
Discharge: HOME | End: 2021-05-13
Admitting: RADIOLOGY
Payer: MEDICARE

## 2021-05-13 ENCOUNTER — HOSPITAL ENCOUNTER (OUTPATIENT)
Facility: CLINIC | Age: 66
End: 2021-05-13
Payer: MEDICARE

## 2021-05-13 VITALS
TEMPERATURE: 96.2 F | DIASTOLIC BLOOD PRESSURE: 77 MMHG | SYSTOLIC BLOOD PRESSURE: 165 MMHG | OXYGEN SATURATION: 100 % | HEART RATE: 83 BPM | RESPIRATION RATE: 18 BRPM

## 2021-05-13 DIAGNOSIS — K70.31 ASCITES DUE TO ALCOHOLIC CIRRHOSIS (H): ICD-10-CM

## 2021-05-13 PROCEDURE — 999N000154 HC STATISTIC RADIOLOGY XRAY, US, CT, MAR, NM

## 2021-05-13 PROCEDURE — 49083 ABD PARACENTESIS W/IMAGING: CPT

## 2021-05-13 PROCEDURE — 272N000706 US PARACENTESIS

## 2021-05-13 RX ORDER — LIDOCAINE 40 MG/G
CREAM TOPICAL
Status: DISCONTINUED | OUTPATIENT
Start: 2021-05-13 | End: 2021-05-13 | Stop reason: HOSPADM

## 2021-05-13 RX ORDER — LIDOCAINE HYDROCHLORIDE 10 MG/ML
10 INJECTION, SOLUTION EPIDURAL; INFILTRATION; INTRACAUDAL; PERINEURAL ONCE
Status: COMPLETED | OUTPATIENT
Start: 2021-05-13 | End: 2021-05-13

## 2021-05-13 RX ORDER — ALBUMIN (HUMAN) 12.5 G/50ML
12.5 SOLUTION INTRAVENOUS ONCE
Status: DISCONTINUED | OUTPATIENT
Start: 2021-05-13 | End: 2021-05-13 | Stop reason: HOSPADM

## 2021-05-13 RX ORDER — DEXTROSE MONOHYDRATE 25 G/50ML
25-50 INJECTION, SOLUTION INTRAVENOUS
Status: DISCONTINUED | OUTPATIENT
Start: 2021-05-13 | End: 2021-05-13 | Stop reason: HOSPADM

## 2021-05-13 RX ORDER — NICOTINE POLACRILEX 4 MG
15-30 LOZENGE BUCCAL
Status: DISCONTINUED | OUTPATIENT
Start: 2021-05-13 | End: 2021-05-13 | Stop reason: HOSPADM

## 2021-05-13 RX ADMIN — LIDOCAINE HYDROCHLORIDE 10 ML: 10 INJECTION, SOLUTION EPIDURAL; INFILTRATION; INTRACAUDAL; PERINEURAL at 10:34

## 2021-05-13 NOTE — PROGRESS NOTES
Care Suites Procedure Nursing Note    Patient Information  Name: Jim Richard  Age: 66 year old    Procedure: Paracentesis: patient taken to department via cart. Ultrasound tech performed preliminary scan to find pockets of fluid. MD arrived to explain procedure, obtained consent. Time out was then done. Procedure begun, no issues, drainage in process. Patient was monitored with BP and O2 sats. Patient tolerated well. VSS. 5100 cc re fluid removed from abdomen w/o difficulty. Bandaid applied to site.     1055- Pt back to Care Suites per cart.     Procedure start time: 1025  Procedure complete time: 1053  Concerns/abnormal assessment: No  If abnormal assessment, provider notified: N/A  Plan/Other: discharge to home after recovery    Care Suites Discharge Nursing Note    Patient Information  Name: Jim Richard  Age: 66 year old    Discharge Education:  Discharge instructions reviewed: Yes  Additional education/resources provided: No  Patient/patient representative verbalizes understanding: Yes  Patient discharging on new medications: No  Medication education completed: N/A    Discharge Plans:   Discharge location: home  Discharge ride contacted: N/A  Approximate discharge time: 1128    Discharge Criteria:  Discharge criteria met and vital signs stable: Yes    Patient Belongs:  Patient belongings returned to patient: Yes    Alis Ramos, RN         Alis Ramos, RN

## 2021-05-13 NOTE — IR NOTE
ULTRASOUND GUIDED PARACENTESIS   5/13/2021 11:02 AM     HISTORY:  Ascites due to alcoholic cirrhosis (H)    PROCEDURE:   Informed consent was obtained from the patient prior to the procedure with discussion including the possible risks of bleeding, infection and organ injury . Using 5 mL of 1% lidocaine for local anesthesia, sterile technique, and sonographic guidance with permanent image documentation, I placed an 8F paracentesis catheter into the peritoneal fluid collection. This was used to aspirate 5100 mL of yellow, serous fluid in vacuum bottles, and some of this was sent for any laboratory studies that had been ordered. There were no immediate complications.  Intravenous albumen replacement was performed according to protocol.    IMPRESSION:  Ultrasound guided paracentesis

## 2021-05-13 NOTE — PROGRESS NOTES
Care Suites Admission Nursing Note    Patient Information  Name: Jim Richard  Age: 66 year old  Reason for admission: paracentesis  Care Suites arrival time: 0945    Visitor Information  Name: none  Informed of visitor restrictions: N/A  1 visitor allowed per patient   Visitor must screen negative for COVID symptoms   Visitor must wear a mask  Waiting rooms closed to visitors    Patient Admission/Assessment   Pre-procedure assessment complete: Yes  If abnormal assessment/labs, provider notified: N/A  NPO: Yes  Medications held per instructions/orders: Yes  Consent: obtained  If applicable, pregnancy test status: n/a  Patient oriented to room: Yes  Education/questions answered: Yes  Plan/other: proceed    Discharge Planning  Discharge name/phone number: n/a  Overnight post sedation caregiver:   Discharge location: home  PATIENT/VISITOR WELLNESS SCREENING    Step 1 Patient Screening    1. In the last month, have you been in contact with someone who was confirmed or suspected to have Coronavirus/COVID-19? No    2. Do you have the following symptoms?  Fever/Chills? No   Cough? No   Shortness of breath? No   New loss of taste or smell? No  Sore throat? No  Muscle or body aches? No  Headaches? No  Fatigue? No  Vomiting or diarrhea? No    Step 2 Visitor Screening    1. Name of Visitor (1 visitor per patient): n/a      If the visitor has positive symptoms, notify supervisor/manger  Per policy, the visitor will need to leave the facility     Step 3 Refer to logic grid below for actions    NO SYMPTOM(S)    ACTIONS:  1. Standard rooming process  2. Provider to assess per normal protocol  3. Implement precautions as needed and per guidelines     POSITIVE SYMPTOM(S)  If positive for ANY of the following symptoms: fever, cough, shortness of breath, rash    ACTION:  1. Continue to have the patient wear a mask   2. Room patient as soon as possible  3. Don appropriate PPE when entering room  4. Provider evaluation    Raul GRESHAM  VASQUEZ Bills

## 2021-05-13 NOTE — DISCHARGE INSTRUCTIONS

## 2021-05-19 ENCOUNTER — TELEPHONE (OUTPATIENT)
Dept: MEDSURG UNIT | Facility: CLINIC | Age: 66
End: 2021-05-19

## 2021-05-19 NOTE — TELEPHONE ENCOUNTER
"RN called patient to verify if he had gone to his scheduled COVID test this morning. Patient stated he did not attend his appointment as he was \"sleeping.\" RN asked if patient would be able to go to COVID test this afternoon if RN was able to assist with rescheduling, patient stated he could not go this afternoon because he didn't feel well. RN asked if patient was still needing to come in for his scheduled paracentesis tomorrow, patient stated he was doing OK and did not need to come in for paracentesis tomorrow. RN transferred patient to radiology scheduling so patient could cancel tomorrows procedure.  "

## 2021-05-28 ENCOUNTER — HOSPITAL ENCOUNTER (OUTPATIENT)
Facility: CLINIC | Age: 66
Discharge: HOME | End: 2021-05-28
Admitting: RADIOLOGY
Payer: MEDICARE

## 2021-05-28 ENCOUNTER — HOSPITAL ENCOUNTER (OUTPATIENT)
Dept: ULTRASOUND IMAGING | Facility: CLINIC | Age: 66
End: 2021-05-28
Attending: FAMILY MEDICINE
Payer: MEDICARE

## 2021-05-28 VITALS
HEART RATE: 74 BPM | SYSTOLIC BLOOD PRESSURE: 158 MMHG | DIASTOLIC BLOOD PRESSURE: 81 MMHG | RESPIRATION RATE: 16 BRPM | OXYGEN SATURATION: 99 %

## 2021-05-28 DIAGNOSIS — K70.31 ALCOHOLIC CIRRHOSIS OF LIVER WITH ASCITES (H): Primary | ICD-10-CM

## 2021-05-28 DIAGNOSIS — Z11.59 ENCOUNTER FOR SCREENING FOR OTHER VIRAL DISEASES: ICD-10-CM

## 2021-05-28 DIAGNOSIS — K70.31 ASCITES DUE TO ALCOHOLIC CIRRHOSIS (H): ICD-10-CM

## 2021-05-28 PROCEDURE — 999N000154 HC STATISTIC RADIOLOGY XRAY, US, CT, MAR, NM

## 2021-05-28 PROCEDURE — 49083 ABD PARACENTESIS W/IMAGING: CPT

## 2021-05-28 PROCEDURE — 87635 SARS-COV-2 COVID-19 AMP PRB: CPT | Performed by: FAMILY MEDICINE

## 2021-05-28 RX ORDER — LIDOCAINE HYDROCHLORIDE 10 MG/ML
10 INJECTION, SOLUTION EPIDURAL; INFILTRATION; INTRACAUDAL; PERINEURAL ONCE
Status: COMPLETED | OUTPATIENT
Start: 2021-05-28 | End: 2021-05-28

## 2021-05-28 RX ORDER — HEPARIN SODIUM (PORCINE) LOCK FLUSH IV SOLN 100 UNIT/ML 100 UNIT/ML
5 SOLUTION INTRAVENOUS
Status: DISCONTINUED | OUTPATIENT
Start: 2021-05-28 | End: 2021-05-28 | Stop reason: HOSPADM

## 2021-05-28 RX ORDER — ALBUMIN (HUMAN) 12.5 G/50ML
12.5 SOLUTION INTRAVENOUS
Status: DISCONTINUED | OUTPATIENT
Start: 2021-05-28 | End: 2021-05-28 | Stop reason: HOSPADM

## 2021-05-28 RX ORDER — HEPARIN SODIUM,PORCINE 10 UNIT/ML
5 VIAL (ML) INTRAVENOUS
Status: DISCONTINUED | OUTPATIENT
Start: 2021-05-28 | End: 2021-05-28 | Stop reason: HOSPADM

## 2021-05-28 RX ORDER — ALBUMIN (HUMAN) 12.5 G/50ML
12.5 SOLUTION INTRAVENOUS
Status: CANCELLED | OUTPATIENT
Start: 2021-05-28

## 2021-05-28 RX ORDER — HEPARIN SODIUM (PORCINE) LOCK FLUSH IV SOLN 100 UNIT/ML 100 UNIT/ML
5 SOLUTION INTRAVENOUS
Status: CANCELLED | OUTPATIENT
Start: 2021-05-28

## 2021-05-28 RX ORDER — HEPARIN SODIUM,PORCINE 10 UNIT/ML
5 VIAL (ML) INTRAVENOUS
Status: CANCELLED | OUTPATIENT
Start: 2021-05-28

## 2021-05-28 RX ADMIN — LIDOCAINE HYDROCHLORIDE 10 ML: 10 INJECTION, SOLUTION EPIDURAL; INFILTRATION; INTRACAUDAL; PERINEURAL at 15:04

## 2021-05-28 NOTE — DISCHARGE INSTRUCTIONS
Paracentesis Discharge Instructions     After you go home:      You may resume your normal diet.    Care of Puncture Site:      For the first 48 hrs, check your puncture site every couple hours while you are awake     If there is a bandaid - you may remove it tomorrow morning    You may shower tomorrow    No tub baths, whirlpools or swimming until your puncture site has fully healed    Fluid may leak from the site. Change the bandaid as needed - keep the site dry    If the fluid leaks for more than 48 hours, call your ordering provider     Activity:      You may go back to normal activity in 24 hours     Wait 48 hours before lifting, straining, exercise or other strenuous activity    Medicines:      You may resume all your medications    For minor pain, you may take Acetaminophen (Tylenol) or Ibuprofen (Advil)            Call the provider who ordered this procedure if:      The site is red, swollen, hot or tender    Blood or fluid is draining from the site    Chills or a fever greater than 101 F (38 C)    Pain that is getting worse    Leaking from the site that does not stop    Any questions or concerns      If you have questions call:        [facility] Radiology Dept @ [phone]

## 2021-05-28 NOTE — PROGRESS NOTES
Care Suites Procedure Nursing Note    Patient Information  Name: Jim Richard  Age: 66 year old    Procedure  Procedure: Paracentesis   Procedure start time: 1457  Procedure complete time: 1526  Concerns/abnormal assessment: no immediate  If abnormal assessment, provider notified: N/A  Plan/Other: Proceed as planned    Jovani Simons RN     1500 Time Out done.    Paracentesis: Pt tolerated well. VSS. 6350 cc clear yellow fluid removed from abdomen w/o difficulty. Bandaid applied to site - CDI. No Albumin given per protocol.     1534  Pt back to Care Suites.    Care Suites Post Procedure Note    Patient Information  Name: Jim Richard  Age: 66 year old    Post Procedure  Time patient returned to Care Suites: 1534  Concerns/abnormal assessment: No immediate  If abnormal assessment, provider notified: N/A  Plan/Other: Continue post procedure plan of care  Right paracentesis site: band aid CDI.  VS stable, denies any pain or discomfort. Taking po fluid well.  Anticipate discharge by 1600    Jovani Simons RN     Care Suites Discharge Nursing Note    Patient Information  Name: Jim Richard  Age: 66 year old    Discharge Education:  Discharge instructions reviewed: Yes  Additional education/resources provided: NA  Patient/patient representative verbalizes understanding: Yes  Patient discharging on new medications: NA  Medication education completed: N/A    Discharge Plans:   Discharge location: home  Discharge ride contacted: Yes  Approximate discharge time: 1600    Discharge Criteria:  Discharge criteria met and vital signs stable: Yes    Patient Belongs:  Patient belongings returned to patient: Yes    Jovani Smions RN

## 2021-06-06 ENCOUNTER — HOSPITAL ENCOUNTER (INPATIENT)
Facility: CLINIC | Age: 66
LOS: 11 days | Discharge: HOME HEALTH | DRG: 441 | End: 2021-06-17
Attending: EMERGENCY MEDICINE | Admitting: INTERNAL MEDICINE
Payer: MEDICARE

## 2021-06-06 DIAGNOSIS — Z76.89 HEALTH CARE HOME: ICD-10-CM

## 2021-06-06 DIAGNOSIS — K72.10 END-STAGE LIVER DISEASE (H): ICD-10-CM

## 2021-06-06 DIAGNOSIS — J18.9 PNEUMONIA DUE TO INFECTIOUS ORGANISM, UNSPECIFIED LATERALITY, UNSPECIFIED PART OF LUNG: Primary | ICD-10-CM

## 2021-06-06 DIAGNOSIS — N17.9 ACUTE RENAL FAILURE, UNSPECIFIED ACUTE RENAL FAILURE TYPE (H): ICD-10-CM

## 2021-06-06 LAB
ALBUMIN SERPL-MCNC: 2.2 G/DL (ref 3.4–5)
ALBUMIN UR-MCNC: 20 MG/DL
ALP SERPL-CCNC: 160 U/L (ref 40–150)
ALT SERPL W P-5'-P-CCNC: 15 U/L (ref 0–70)
ANION GAP SERPL CALCULATED.3IONS-SCNC: 8 MMOL/L (ref 3–14)
APPEARANCE FLD: NORMAL
APPEARANCE UR: CLEAR
AST SERPL W P-5'-P-CCNC: 31 U/L (ref 0–45)
BASOPHILS # BLD AUTO: 0 10E9/L (ref 0–0.2)
BASOPHILS NFR BLD AUTO: 1.1 %
BILIRUB SERPL-MCNC: 0.3 MG/DL (ref 0.2–1.3)
BILIRUB UR QL STRIP: NEGATIVE
BUN SERPL-MCNC: 32 MG/DL (ref 7–30)
CALCIUM SERPL-MCNC: 7.6 MG/DL (ref 8.5–10.1)
CHLORIDE SERPL-SCNC: 114 MMOL/L (ref 94–109)
CO2 SERPL-SCNC: 19 MMOL/L (ref 20–32)
COLOR FLD: YELLOW
COLOR UR AUTO: YELLOW
CREAT SERPL-MCNC: 1.98 MG/DL (ref 0.66–1.25)
DIFFERENTIAL METHOD BLD: ABNORMAL
EOSINOPHIL # BLD AUTO: 0 10E9/L (ref 0–0.7)
EOSINOPHIL NFR BLD AUTO: 0.3 %
ERYTHROCYTE [DISTWIDTH] IN BLOOD BY AUTOMATED COUNT: 18.6 % (ref 10–15)
ETHANOL SERPL-MCNC: 0.16 G/DL
GFR SERPL CREATININE-BSD FRML MDRD: 34 ML/MIN/{1.73_M2}
GLUCOSE SERPL-MCNC: 81 MG/DL (ref 70–99)
GLUCOSE UR STRIP-MCNC: NEGATIVE MG/DL
GRAM STN SPEC: NORMAL
HCT VFR BLD AUTO: 22.6 % (ref 40–53)
HGB BLD-MCNC: 7.4 G/DL (ref 13.3–17.7)
HGB UR QL STRIP: NEGATIVE
IMM GRANULOCYTES # BLD: 0 10E9/L (ref 0–0.4)
IMM GRANULOCYTES NFR BLD: 0.6 %
INR PPP: 1.31 (ref 0.86–1.14)
KETONES UR STRIP-MCNC: NEGATIVE MG/DL
LABORATORY COMMENT REPORT: NORMAL
LEUKOCYTE ESTERASE UR QL STRIP: NEGATIVE
LYMPHOCYTES # BLD AUTO: 0.4 10E9/L (ref 0.8–5.3)
LYMPHOCYTES NFR BLD AUTO: 11 %
LYMPHOCYTES NFR FLD MANUAL: 9 %
MCH RBC QN AUTO: 32.7 PG (ref 26.5–33)
MCHC RBC AUTO-ENTMCNC: 32.7 G/DL (ref 31.5–36.5)
MCV RBC AUTO: 100 FL (ref 78–100)
MONOCYTES # BLD AUTO: 0.3 10E9/L (ref 0–1.3)
MONOCYTES NFR BLD AUTO: 9.1 %
MONOS+MACROS NFR FLD MANUAL: 57 %
MUCOUS THREADS #/AREA URNS LPF: PRESENT /LPF
NEUTROPHILS # BLD AUTO: 2.8 10E9/L (ref 1.6–8.3)
NEUTROPHILS NFR BLD AUTO: 77.9 %
NEUTS BAND NFR FLD MANUAL: 4 %
NITRATE UR QL: NEGATIVE
NRBC # BLD AUTO: 0 10*3/UL
NRBC BLD AUTO-RTO: 0 /100
OTHER CELLS FLD MANUAL: 30 %
PH UR STRIP: 5.5 PH (ref 5–7)
PLATELET # BLD AUTO: 111 10E9/L (ref 150–450)
POTASSIUM SERPL-SCNC: 4 MMOL/L (ref 3.4–5.3)
PROT SERPL-MCNC: 5.8 G/DL (ref 6.8–8.8)
RBC # BLD AUTO: 2.26 10E12/L (ref 4.4–5.9)
RBC #/AREA URNS AUTO: 1 /HPF (ref 0–2)
SARS-COV-2 RNA RESP QL NAA+PROBE: NEGATIVE
SODIUM SERPL-SCNC: 141 MMOL/L (ref 133–144)
SOURCE: ABNORMAL
SP GR UR STRIP: 1.02 (ref 1–1.03)
SPECIMEN SOURCE FLD: NORMAL
SPECIMEN SOURCE: NORMAL
SPECIMEN SOURCE: NORMAL
SQUAMOUS #/AREA URNS AUTO: <1 /HPF (ref 0–1)
UROBILINOGEN UR STRIP-MCNC: 0 MG/DL (ref 0–2)
WBC # BLD AUTO: 3.6 10E9/L (ref 4–11)
WBC # FLD AUTO: 10 /UL
WBC #/AREA URNS AUTO: 2 /HPF (ref 0–5)

## 2021-06-06 PROCEDURE — 87070 CULTURE OTHR SPECIMN AEROBIC: CPT | Performed by: EMERGENCY MEDICINE

## 2021-06-06 PROCEDURE — 120N000001 HC R&B MED SURG/OB

## 2021-06-06 PROCEDURE — 89051 BODY FLUID CELL COUNT: CPT | Performed by: EMERGENCY MEDICINE

## 2021-06-06 PROCEDURE — 99285 EMERGENCY DEPT VISIT HI MDM: CPT | Mod: 25

## 2021-06-06 PROCEDURE — 87015 SPECIMEN INFECT AGNT CONCNTJ: CPT | Performed by: EMERGENCY MEDICINE

## 2021-06-06 PROCEDURE — 250N000013 HC RX MED GY IP 250 OP 250 PS 637: Performed by: INTERNAL MEDICINE

## 2021-06-06 PROCEDURE — 96375 TX/PRO/DX INJ NEW DRUG ADDON: CPT

## 2021-06-06 PROCEDURE — 99222 1ST HOSP IP/OBS MODERATE 55: CPT | Mod: AI | Performed by: INTERNAL MEDICINE

## 2021-06-06 PROCEDURE — 81001 URINALYSIS AUTO W/SCOPE: CPT | Performed by: EMERGENCY MEDICINE

## 2021-06-06 PROCEDURE — 85610 PROTHROMBIN TIME: CPT | Performed by: EMERGENCY MEDICINE

## 2021-06-06 PROCEDURE — 250N000011 HC RX IP 250 OP 636: Performed by: INTERNAL MEDICINE

## 2021-06-06 PROCEDURE — 49082 ABD PARACENTESIS: CPT

## 2021-06-06 PROCEDURE — 80053 COMPREHEN METABOLIC PANEL: CPT | Performed by: EMERGENCY MEDICINE

## 2021-06-06 PROCEDURE — P9047 ALBUMIN (HUMAN), 25%, 50ML: HCPCS | Performed by: INTERNAL MEDICINE

## 2021-06-06 PROCEDURE — 96374 THER/PROPH/DIAG INJ IV PUSH: CPT

## 2021-06-06 PROCEDURE — 85025 COMPLETE CBC W/AUTO DIFF WBC: CPT | Performed by: EMERGENCY MEDICINE

## 2021-06-06 PROCEDURE — 250N000013 HC RX MED GY IP 250 OP 250 PS 637: Performed by: EMERGENCY MEDICINE

## 2021-06-06 PROCEDURE — 82077 ASSAY SPEC XCP UR&BREATH IA: CPT | Performed by: EMERGENCY MEDICINE

## 2021-06-06 PROCEDURE — 99207 PR CDG-MDM COMPONENT: MEETS MODERATE - DOWN CODED: CPT | Performed by: INTERNAL MEDICINE

## 2021-06-06 PROCEDURE — HZ2ZZZZ DETOXIFICATION SERVICES FOR SUBSTANCE ABUSE TREATMENT: ICD-10-PCS | Performed by: INTERNAL MEDICINE

## 2021-06-06 PROCEDURE — 87205 SMEAR GRAM STAIN: CPT | Performed by: EMERGENCY MEDICINE

## 2021-06-06 PROCEDURE — C9803 HOPD COVID-19 SPEC COLLECT: HCPCS

## 2021-06-06 PROCEDURE — 87635 SARS-COV-2 COVID-19 AMP PRB: CPT | Performed by: EMERGENCY MEDICINE

## 2021-06-06 RX ORDER — ALBUTEROL SULFATE 90 UG/1
1-2 AEROSOL, METERED RESPIRATORY (INHALATION) EVERY 4 HOURS PRN
Status: DISCONTINUED | OUTPATIENT
Start: 2021-06-06 | End: 2021-06-17 | Stop reason: HOSPADM

## 2021-06-06 RX ORDER — LANOLIN ALCOHOL/MO/W.PET/CERES
100 CREAM (GRAM) TOPICAL DAILY
Status: DISCONTINUED | OUTPATIENT
Start: 2021-06-06 | End: 2021-06-17 | Stop reason: HOSPADM

## 2021-06-06 RX ORDER — HYDROMORPHONE HYDROCHLORIDE 1 MG/ML
.3-.5 INJECTION, SOLUTION INTRAMUSCULAR; INTRAVENOUS; SUBCUTANEOUS
Status: DISCONTINUED | OUTPATIENT
Start: 2021-06-06 | End: 2021-06-17 | Stop reason: HOSPADM

## 2021-06-06 RX ORDER — NALOXONE HYDROCHLORIDE 0.4 MG/ML
0.2 INJECTION, SOLUTION INTRAMUSCULAR; INTRAVENOUS; SUBCUTANEOUS
Status: DISCONTINUED | OUTPATIENT
Start: 2021-06-06 | End: 2021-06-17 | Stop reason: HOSPADM

## 2021-06-06 RX ORDER — NALOXONE HYDROCHLORIDE 0.4 MG/ML
0.4 INJECTION, SOLUTION INTRAMUSCULAR; INTRAVENOUS; SUBCUTANEOUS
Status: DISCONTINUED | OUTPATIENT
Start: 2021-06-06 | End: 2021-06-17 | Stop reason: HOSPADM

## 2021-06-06 RX ORDER — ESZOPICLONE 3 MG/1
3 TABLET, FILM COATED ORAL
Status: DISCONTINUED | OUTPATIENT
Start: 2021-06-06 | End: 2021-06-17 | Stop reason: HOSPADM

## 2021-06-06 RX ORDER — QUETIAPINE FUMARATE 50 MG/1
50 TABLET, FILM COATED ORAL ONCE
Status: COMPLETED | OUTPATIENT
Start: 2021-06-06 | End: 2021-06-06

## 2021-06-06 RX ORDER — PANTOPRAZOLE SODIUM 40 MG/1
40 TABLET, DELAYED RELEASE ORAL 2 TIMES DAILY
Status: DISCONTINUED | OUTPATIENT
Start: 2021-06-06 | End: 2021-06-17 | Stop reason: HOSPADM

## 2021-06-06 RX ORDER — ONDANSETRON 2 MG/ML
4 INJECTION INTRAMUSCULAR; INTRAVENOUS EVERY 6 HOURS PRN
Status: DISCONTINUED | OUTPATIENT
Start: 2021-06-06 | End: 2021-06-17 | Stop reason: HOSPADM

## 2021-06-06 RX ORDER — LIDOCAINE 40 MG/G
CREAM TOPICAL
Status: DISCONTINUED | OUTPATIENT
Start: 2021-06-06 | End: 2021-06-17 | Stop reason: HOSPADM

## 2021-06-06 RX ORDER — GABAPENTIN 300 MG/1
300 CAPSULE ORAL 3 TIMES DAILY
Status: DISCONTINUED | OUTPATIENT
Start: 2021-06-06 | End: 2021-06-17 | Stop reason: HOSPADM

## 2021-06-06 RX ORDER — DIAZEPAM 10 MG
10 TABLET ORAL EVERY 12 HOURS PRN
COMMUNITY
End: 2021-06-25

## 2021-06-06 RX ORDER — MIDODRINE HYDROCHLORIDE 2.5 MG/1
2.5 TABLET ORAL 3 TIMES DAILY
Status: DISCONTINUED | OUTPATIENT
Start: 2021-06-06 | End: 2021-06-17 | Stop reason: HOSPADM

## 2021-06-06 RX ORDER — ALBUTEROL SULFATE 90 UG/1
1-2 AEROSOL, METERED RESPIRATORY (INHALATION) EVERY 4 HOURS PRN
Status: ON HOLD | COMMUNITY
End: 2021-07-13

## 2021-06-06 RX ORDER — ALBUMIN (HUMAN) 12.5 G/50ML
12.5 SOLUTION INTRAVENOUS ONCE
Status: DISCONTINUED | OUTPATIENT
Start: 2021-06-06 | End: 2021-06-06

## 2021-06-06 RX ORDER — TAMSULOSIN HYDROCHLORIDE 0.4 MG/1
0.8 CAPSULE ORAL DAILY
Status: DISCONTINUED | OUTPATIENT
Start: 2021-06-07 | End: 2021-06-17 | Stop reason: HOSPADM

## 2021-06-06 RX ORDER — ALBUMIN (HUMAN) 12.5 G/50ML
25 SOLUTION INTRAVENOUS ONCE
Status: DISCONTINUED | OUTPATIENT
Start: 2021-06-06 | End: 2021-06-06

## 2021-06-06 RX ORDER — FOLIC ACID 1 MG/1
1 TABLET ORAL DAILY
Status: DISCONTINUED | OUTPATIENT
Start: 2021-06-07 | End: 2021-06-17 | Stop reason: HOSPADM

## 2021-06-06 RX ORDER — NICOTINE 21 MG/24HR
1 PATCH, TRANSDERMAL 24 HOURS TRANSDERMAL EVERY 24 HOURS
Status: ON HOLD | COMMUNITY
End: 2021-07-13

## 2021-06-06 RX ORDER — POLYETHYLENE GLYCOL 3350 17 G/17G
17 POWDER, FOR SOLUTION ORAL DAILY PRN
Status: DISCONTINUED | OUTPATIENT
Start: 2021-06-06 | End: 2021-06-17 | Stop reason: HOSPADM

## 2021-06-06 RX ORDER — ESZOPICLONE 3 MG/1
3 TABLET, FILM COATED ORAL
Status: ON HOLD | COMMUNITY
End: 2021-07-13

## 2021-06-06 RX ORDER — MIDODRINE HYDROCHLORIDE 2.5 MG/1
2.5 TABLET ORAL 3 TIMES DAILY
Status: ON HOLD | COMMUNITY
End: 2021-07-13

## 2021-06-06 RX ORDER — OXYCODONE HYDROCHLORIDE 5 MG/1
5-10 TABLET ORAL
Status: DISCONTINUED | OUTPATIENT
Start: 2021-06-06 | End: 2021-06-17 | Stop reason: HOSPADM

## 2021-06-06 RX ORDER — ONDANSETRON 4 MG/1
4 TABLET, ORALLY DISINTEGRATING ORAL EVERY 6 HOURS PRN
Status: DISCONTINUED | OUTPATIENT
Start: 2021-06-06 | End: 2021-06-17 | Stop reason: HOSPADM

## 2021-06-06 RX ORDER — MIRTAZAPINE 15 MG/1
30 TABLET, FILM COATED ORAL AT BEDTIME
Status: DISCONTINUED | OUTPATIENT
Start: 2021-06-06 | End: 2021-06-17 | Stop reason: HOSPADM

## 2021-06-06 RX ORDER — HYDROXYZINE HYDROCHLORIDE 25 MG/1
50 TABLET, FILM COATED ORAL
Status: DISCONTINUED | OUTPATIENT
Start: 2021-06-06 | End: 2021-06-17 | Stop reason: HOSPADM

## 2021-06-06 RX ORDER — QUETIAPINE FUMARATE 50 MG/1
100 TABLET, FILM COATED ORAL ONCE
Status: DISCONTINUED | OUTPATIENT
Start: 2021-06-06 | End: 2021-06-06

## 2021-06-06 RX ORDER — NICOTINE 21 MG/24HR
1 PATCH, TRANSDERMAL 24 HOURS TRANSDERMAL
Status: DISCONTINUED | OUTPATIENT
Start: 2021-06-06 | End: 2021-06-17 | Stop reason: HOSPADM

## 2021-06-06 RX ORDER — QUETIAPINE FUMARATE 100 MG/1
200 TABLET, FILM COATED ORAL AT BEDTIME
Status: DISCONTINUED | OUTPATIENT
Start: 2021-06-06 | End: 2021-06-17 | Stop reason: HOSPADM

## 2021-06-06 RX ORDER — LORAZEPAM 0.5 MG/1
0.5 TABLET ORAL EVERY 4 HOURS PRN
Status: DISCONTINUED | OUTPATIENT
Start: 2021-06-06 | End: 2021-06-07

## 2021-06-06 RX ORDER — QUETIAPINE FUMARATE 25 MG/1
50 TABLET, FILM COATED ORAL 3 TIMES DAILY PRN
Status: DISCONTINUED | OUTPATIENT
Start: 2021-06-06 | End: 2021-06-17 | Stop reason: HOSPADM

## 2021-06-06 RX ORDER — ALBUMIN (HUMAN) 12.5 G/50ML
25 SOLUTION INTRAVENOUS ONCE
Status: COMPLETED | OUTPATIENT
Start: 2021-06-06 | End: 2021-06-06

## 2021-06-06 RX ORDER — ACETAMINOPHEN 325 MG/1
650 TABLET ORAL EVERY 8 HOURS PRN
Status: DISCONTINUED | OUTPATIENT
Start: 2021-06-06 | End: 2021-06-17 | Stop reason: HOSPADM

## 2021-06-06 RX ORDER — GABAPENTIN 300 MG/1
300 CAPSULE ORAL 3 TIMES DAILY
Status: ON HOLD | COMMUNITY
End: 2021-07-13

## 2021-06-06 RX ORDER — TRAZODONE HYDROCHLORIDE 100 MG/1
100 TABLET ORAL AT BEDTIME
Status: DISCONTINUED | OUTPATIENT
Start: 2021-06-06 | End: 2021-06-17 | Stop reason: HOSPADM

## 2021-06-06 RX ADMIN — MIRTAZAPINE 30 MG: 15 TABLET, FILM COATED ORAL at 21:29

## 2021-06-06 RX ADMIN — THIAMINE HCL TAB 100 MG 100 MG: 100 TAB at 21:30

## 2021-06-06 RX ADMIN — HYDROMORPHONE HYDROCHLORIDE 0.5 MG: 1 INJECTION, SOLUTION INTRAMUSCULAR; INTRAVENOUS; SUBCUTANEOUS at 15:39

## 2021-06-06 RX ADMIN — HYDROMORPHONE HYDROCHLORIDE 0.5 MG: 1 INJECTION, SOLUTION INTRAMUSCULAR; INTRAVENOUS; SUBCUTANEOUS at 19:51

## 2021-06-06 RX ADMIN — QUETIAPINE FUMARATE 50 MG: 50 TABLET ORAL at 11:32

## 2021-06-06 RX ADMIN — HYDROMORPHONE HYDROCHLORIDE 0.5 MG: 1 INJECTION, SOLUTION INTRAMUSCULAR; INTRAVENOUS; SUBCUTANEOUS at 13:06

## 2021-06-06 RX ADMIN — PANTOPRAZOLE SODIUM 40 MG: 40 TABLET, DELAYED RELEASE ORAL at 21:30

## 2021-06-06 RX ADMIN — QUETIAPINE FUMARATE 200 MG: 100 TABLET ORAL at 21:29

## 2021-06-06 RX ADMIN — VORTIOXETINE 10 MG: 10 TABLET, FILM COATED ORAL at 22:19

## 2021-06-06 RX ADMIN — TRAZODONE HYDROCHLORIDE 100 MG: 100 TABLET ORAL at 21:29

## 2021-06-06 RX ADMIN — CARBIDOPA AND LEVODOPA 2.5 MG: 50; 200 TABLET, EXTENDED RELEASE ORAL at 22:19

## 2021-06-06 RX ADMIN — OXYCODONE HYDROCHLORIDE 5 MG: 5 TABLET ORAL at 21:31

## 2021-06-06 RX ADMIN — NICOTINE 1 PATCH: 21 PATCH, EXTENDED RELEASE TRANSDERMAL at 21:28

## 2021-06-06 RX ADMIN — GABAPENTIN 300 MG: 300 CAPSULE ORAL at 21:29

## 2021-06-06 RX ADMIN — ALBUMIN HUMAN 12.5 G: 0.25 SOLUTION INTRAVENOUS at 12:25

## 2021-06-06 SDOH — HEALTH STABILITY: MENTAL HEALTH: HOW OFTEN DO YOU HAVE 6 OR MORE DRINKS ON ONE OCCASION?: NOT ASKED

## 2021-06-06 SDOH — ECONOMIC STABILITY: TRANSPORTATION INSECURITY
IN THE PAST 12 MONTHS, HAS THE LACK OF TRANSPORTATION KEPT YOU FROM MEDICAL APPOINTMENTS OR FROM GETTING MEDICATIONS?: NOT ASKED

## 2021-06-06 SDOH — ECONOMIC STABILITY: INCOME INSECURITY: HOW HARD IS IT FOR YOU TO PAY FOR THE VERY BASICS LIKE FOOD, HOUSING, MEDICAL CARE, AND HEATING?: NOT ASKED

## 2021-06-06 SDOH — HEALTH STABILITY: MENTAL HEALTH: HOW MANY STANDARD DRINKS CONTAINING ALCOHOL DO YOU HAVE ON A TYPICAL DAY?: NOT ASKED

## 2021-06-06 SDOH — ECONOMIC STABILITY: FOOD INSECURITY: WITHIN THE PAST 12 MONTHS, THE FOOD YOU BOUGHT JUST DIDN'T LAST AND YOU DIDN'T HAVE MONEY TO GET MORE.: NOT ASKED

## 2021-06-06 SDOH — ECONOMIC STABILITY: FOOD INSECURITY: WITHIN THE PAST 12 MONTHS, YOU WORRIED THAT YOUR FOOD WOULD RUN OUT BEFORE YOU GOT MONEY TO BUY MORE.: NOT ASKED

## 2021-06-06 SDOH — HEALTH STABILITY: MENTAL HEALTH: HOW OFTEN DO YOU HAVE A DRINK CONTAINING ALCOHOL?: NOT ASKED

## 2021-06-06 SDOH — ECONOMIC STABILITY: TRANSPORTATION INSECURITY
IN THE PAST 12 MONTHS, HAS LACK OF TRANSPORTATION KEPT YOU FROM MEETINGS, WORK, OR FROM GETTING THINGS NEEDED FOR DAILY LIVING?: NOT ASKED

## 2021-06-06 ASSESSMENT — ACTIVITIES OF DAILY LIVING (ADL)
DIFFICULTY_EATING/SWALLOWING: NO
DIFFICULTY_COMMUNICATING: NO
ADLS_ACUITY_SCORE: 13
TOILETING_ISSUES: NO
ADLS_ACUITY_SCORE: 13
DRESSING/BATHING_DIFFICULTY: NO
EQUIPMENT_CURRENTLY_USED_AT_HOME: WALKER, STANDARD

## 2021-06-06 ASSESSMENT — ENCOUNTER SYMPTOMS
CHILLS: 0
VOMITING: 0
WHEEZING: 0
FEVER: 0
HEADACHES: 0
BLOOD IN STOOL: 0
ABDOMINAL PAIN: 1
DIARRHEA: 0
COUGH: 0
WEAKNESS: 1
SHORTNESS OF BREATH: 0

## 2021-06-06 ASSESSMENT — MIFFLIN-ST. JEOR: SCORE: 1595.46

## 2021-06-06 NOTE — ED NOTES
[facility]  ED Nurse Handoff Report    ED Chief complaint: Abdominal Pain (Pint of vodka last night. Fell out of bed. Too weak to get back up. No CP/LOC. Abdomen distended. Gets tapped approx every 2 weeks. About due for another tap.)      ED Diagnosis:   Final diagnoses:   None       Code Status: Full Code    Allergies:   Allergies   Allergen Reactions     Amlodipine Swelling     Lisinopril      Other reaction(s): Angioedema  Mouth and tongue swelling   Mouth and tongue swelling          Patient Story: Pt has hx of alcoholism and ascites and frequent paracentesis. Drank a pint of vodka last night and fell out of bed and was too weak to get himself up.  Focused Assessment:  Pt c/o SOB and LUQ abdominal discomfort. Pt's abdomen distended. Minor abrasions to elbows from crawling on the floor. Plan is to perform paracentesis in the ER. Pt also has a Care Plan with recent recommendation that he be admitted to a care facility d/t his increasing weakness and continued problems with alcoholism.    Treatments and/or interventions provided: see above   Patient's response to treatments and/or interventions: see above    To be done/followed up on inpatient unit:  possible care facility placement    Does this patient have any cognitive concerns?: none    Activity level - Baseline/Home:  Independent  Activity Level - Current:   Has not been out of bed in the ED.    Patient's Preferred language: English   Needed?: No    Isolation: None  Infection: Not Applicable  Patient tested for COVID 19 prior to admission: YES  Bariatric?: No    Vital Signs:   Vitals:    06/06/21 0600 06/06/21 0615   BP: (!) 148/77 (!) 146/89   Pulse: 89 84   Resp: 18    Temp: 99  F (37.2  C)    TempSrc: Oral    SpO2: 96% 97%       Cardiac Rhythm:     Was the PSS-3 completed:   Yes  What interventions are required if any?               Family Comments: None present.  OBS brochure/video discussed/provided to patient/family: N/A               Name of person given brochure if not patient: N/A              Relationship to patient: N/A    For the majority of the shift this patient's behavior was Green.   Behavioral interventions performed were information.    ED NURSE PHONE NUMBER: [phone]

## 2021-06-06 NOTE — PLAN OF CARE
"Summary:Pt 66 year old male with past medical hx of alcohol abuse w/alcohol cirrhosis of the liver w/ascites, COPD,JANIS on CPAP @ [facility], NGUYEN,ESLD who was admitted today with abdominal pain, pt reported that he fell out of bed last night after drinking 1 pint of vodka and was unable to get up.  DATE & TIME: 06/06/2021 & Day shift.   Cognitive Concerns/ Orientation : A/O x 4.  BEHAVIOR & AGGRESSION TOOL COLOR: Green  CIWA SCORE: na   ABNL VS/O2: VSS,on RA.  MOBILITY: up with assist of 1/GB  PAIN MANAGMENT: prn dilaudid iv  DIET: clear   BOWEL/BLADDER: continent to B&B  ABNL LAB/BG: cr 1.98,wbc 3.6,hgb 7.4,ipo157, albumin 2.2  DRAIN/DEVICES: SL right AC  TELEMETRY RHYTHM: n/a  SKIN: bruises BUE's/BLLE's, procedure site on the RLQ covered with band aid c/d/I.  TESTS/PROCEDURES: Pt had paracentesis today in ER.  D/C DAY/GOALS/PLACE: TBD  OTHER IMPORTANT INFO: abdomen distended, pt denies any n/v. Pt denies any dizziness or lightheadedness, but did c/o NICOLE/SOB. Pt also c/o generalized weakness. BLE'2+ edema. Albumin replaced. Pt is DNR/DNI. VSS./74 (BP Location: Right arm)   Pulse 77   Temp 97.8  F (36.6  C) (Axillary)   Resp 22   Ht 1.702 m (5' 7\")   Wt 86.2 kg (190 lb)   SpO2 98%   BMI 29.76 kg/m  on RA. Pt gets taped once q 2 weeks.Will continue to monitor  "

## 2021-06-06 NOTE — ED NOTES
Bed: ED02  Expected date:   Expected time:   Means of arrival:   Comments:  HEMS 422 66m sob, ab pain distention eta 550

## 2021-06-06 NOTE — PHARMACY-ADMISSION MEDICATION HISTORY
Pharmacy Medication History  Admission medication history interview status for the 6/6/2021  admission is complete. See EPIC admission navigator for prior to admission medications     Location of Interview: Patient room  Medication history sources: Patient and Surescripts    Significant changes made to the medication list:  Discontinued: atenolol   New: lunesta, gabapentin, midodrine, diazepam   Changed:         In the past week, patient estimated taking medication this percent of the time: 50-90% due to illness    Additional medication history information:       Medication reconciliation completed by provider prior to medication history? No    Time spent in this activity: 25min     Prior to Admission medications    Medication Sig Last Dose Taking? Auth Provider   diazepam (VALIUM) 10 MG tablet Take 10 mg by mouth every 12 hours as needed for anxiety Past Week at Unknown time Yes Unknown, Entered By History   eszopiclone (LUNESTA) 3 MG tablet Take 3 mg by mouth nightly as needed for sleep new Yes Unknown, Entered By History   fluticasone-vilanterol (BREO ELLIPTA) 200-25 MCG/INH inhaler Inhale 1 puff into the lungs daily as needed (SOB) Past Month at Unknown time Yes Jeffrey Villagomez MD   folic acid (FOLVITE) 1 MG tablet Take 1 mg by mouth daily 6/5/2021 at Unknown time Yes Unknown, Entered By History   gabapentin (NEURONTIN) 300 MG capsule Take 300 mg by mouth 3 times daily 6/5/2021 at Unknown time Yes Unknown, Entered By History   hydrOXYzine (ATARAX) 50 MG tablet Take 1 tablet (50 mg) by mouth nightly as needed for other (sleep)  Patient taking differently: Take 50 mg by mouth nightly as needed for other (sleep) Filled 1/11/21 #90 Past Week at Unknown time Yes Jeffrey Villagomez MD   magnesium oxide (MAG-OX) 400 (240 Mg) MG tablet Take 400 mg by mouth daily 6/5/2021 at Unknown time Yes Unknown, Entered By History   midodrine (PROAMATINE) 2.5 MG tablet Take 2.5 mg by mouth 3 times daily 6/5/2021 at  Unknown time Yes Unknown, Entered By History   mirtazapine (REMERON) 30 MG tablet Take 30 mg by mouth At Bedtime Filled 1/11/21 for #30 6/5/2021 at Unknown time Yes Unknown, Entered By History   multivitamin w/minerals (THERA-VIT-M) tablet Take 1 tablet by mouth daily  Patient taking differently: Take 1 tablet by mouth daily Filled 9/21/21 #30 6/5/2021 at Unknown time Yes Jeffrey Villagomez MD   nicotine (NICODERM CQ) 21 MG/24HR 24 hr patch Place 1 patch onto the skin every 24 hours  Yes Unknown, Entered By History   pantoprazole (PROTONIX) 40 MG EC tablet Take 1 tablet (40 mg) by mouth 2 times daily 6/5/2021 at Unknown time Yes Sara Orellana MD   QUEtiapine (SEROQUEL) 200 MG tablet Take 200 mg by mouth At Bedtime Filled 1/11/21 #30  6/5/2021 at Unknown time Yes Unknown, Entered By History   QUEtiapine (SEROQUEL) 50 MG tablet Take 1 tablet (50 mg) by mouth 3 times daily as needed (anxiety) Filled 11/20/20 #30 6/5/2021 at Unknown time Yes Sara Orellana MD   tamsulosin (FLOMAX) 0.4 MG capsule Take 2 capsules (0.8 mg) by mouth daily Past Week at Unknown time Yes Sara Orellana MD   traZODone (DESYREL) 100 MG tablet Take 100 mg by mouth At Bedtime Filled 1/11/21 #30 6/5/2021 at Unknown time Yes Unknown, Entered By History   vortioxetine (TRINTELLIX) 10 MG tablet Take 10 mg by mouth 2 times daily 6/5/2021 at Unknown time Yes Unknown, Entered By History   albuterol (PROAIR HFA/PROVENTIL HFA/VENTOLIN HFA) 108 (90 Base) MCG/ACT inhaler Inhale 1-2 puffs into the lungs every 4 hours as needed for shortness of breath / dyspnea or wheezing More than a month at Unknown time  Unknown, Entered By History       The information provided in this note is only as accurate as the sources available at the time of update(s)   Liliana Basilio PharmD

## 2021-06-06 NOTE — H&P
"[facility]    History and Physical  Hospitalist       Date of Admission:  6/6/2021    Assessment & Plan   66 year old male with past medical hx of alcohol abuse, who presents with abdominal pain:    Summary:    Principal Problem:    Abdominal pain, generalized  Active Problems:    COPD (chronic obstructive pulmonary disease) (H)    JANIS on CPAP    Alcoholic cirrhosis of liver with ascites (H)    NGUYEN (acute kidney injury) (H)    Pancytopenia (H)    End-stage liver disease (H)    Acute renal failure, unspecified acute renal failure type (H)       Plan: Admit to medical floor, rule out SBP.    DVT Prophylaxis: Pneumatic Compression Devices  Code Status: DNR / DNI    Disposition: Expected discharge in 3-5 days     Gray Hicks Carlton  Pager: [phone]  Cell Phone:  [phone]     Primary Care Physician   FERNANDA BRANTLEY    Chief Complaint   abd pain    History is obtained from Patient    History of Present Illness   66 year old male who presents with abdominal pain. He mentions that he has a distended abdomin that he gets tapped every two weeks. His last tap was last week. He mentioned drinking a pint of vodka last night. This morning he rolled out of bed and was unable to get up and he called PD. He has not had any recent falls. Of note, he and his  talked about him living in an assisted living facility but he is worries about the money.  No fever or chills, rates abd pain at 8 out of 10.  He thinks he \"hurt his ribs\", but there is no tenderness over his ribs -- its below that.  He was at [employer] but left 2 months ago (?kicked out) -- and now drinking for the last month, 1 pt of vodka a day.     In ER temp 99.0, Creat 1.98 (was 1.58 ib 5/1/21m Akb 2.2, WBC 3.6, Hgb 7,4, platelets 111K, and LFT's normal except Alk Phos 160, and ETOH 0.16.     PAST MEDICAL HISTORY    Past Medical History:   Diagnosis Date     Alcoholism (H) 1/14/2013    had treatment at [facility]"     Anxiety      COPD (chronic obstructive pulmonary disease) (H)      Coronary artery disease 09/09/2014    Nuclear stress test with slight fixed defect inferiorly, no ischemia     Depression     w/anxiety     Esophageal varices with bleeding 04/28/2018    6 varices banded on EGD     Heart attack (H)      Hepatomegaly     Fatty liver, chronic alcoholic     Hyperlipemia      Hypertension      JANIS on CPAP      Peripheral vascular disease (H)      PVD (peripheral vascular disease) (H)      SDH (subdural hematoma) (H) 04/26/2018    Right side, resolved spontaneously     Spinal stenosis      Substance abuse (H)         PAST SURGICAL HISTORY    Past Surgical History:   Procedure Laterality Date     APPENDECTOMY       BYPASS GRAFT FEMOROPOPLITEAL  6/12/2012    Procedure: BYPASS GRAFT FEMOROPOPLITEAL;  LEFT FEMORAL TO ABOVE KNEE POPLITEAL BYPASS, ENDARTERECTOMY OF SFA AND PF ARTERIES, LEFT EXTERNAL ILIAC TO COMMON FEMORAL INTERPOSITION GRAFT;  Surgeon: Damir Roberts MD;  Location:  OR     COLONOSCOPY       COLONOSCOPY N/A 8/8/2019    Procedure: COLONOSCOPY;  Surgeon: Pavan Arguello MD;  Location:  GI     COLONOSCOPY N/A 3/10/2020    Procedure: COLONOSCOPY;  Surgeon: Rosendo Shaw MD;  Location:  GI     ENDARTERECTOMY FEMORAL  6/12/2012    Procedure: ENDARTERECTOMY FEMORAL;;  Surgeon: Damir Roberts MD;  Location:  OR     ESOPHAGOSCOPY, GASTROSCOPY, DUODENOSCOPY (EGD), COMBINED N/A 3/22/2018    Procedure: COMBINED ESOPHAGOSCOPY, GASTROSCOPY, DUODENOSCOPY (EGD);  ESOPHAGOSCOPY, GASTROSCOPY, DUODENOSOCPY.;  Surgeon: Valeri Pruitt MD;  Location:  OR     ESOPHAGOSCOPY, GASTROSCOPY, DUODENOSCOPY (EGD), COMBINED N/A 9/29/2018    Procedure: COMBINED ESOPHAGOSCOPY, GASTROSCOPY, DUODENOSCOPY (EGD);;  Surgeon: Rosendo Shaw MD;  Location: [facility]     ESOPHAGOSCOPY, GASTROSCOPY, DUODENOSCOPY (EGD), COMBINED N/A 8/2/2019    Procedure: ESOPHAGOGASTRODUODENOSCOPY (EGD);  Surgeon: Pavan Arguello  MD;  Location:  GI     ESOPHAGOSCOPY, GASTROSCOPY, DUODENOSCOPY (EGD), COMBINED N/A 9/25/2019    Procedure: ESOPHAGOGASTRODUODENOSCOPY (EGD);  Surgeon: Pavan Arguello MD;  Location:  GI     ESOPHAGOSCOPY, GASTROSCOPY, DUODENOSCOPY (EGD), COMBINED N/A 3/8/2020    Procedure: ESOPHAGOGASTRODUODENOSCOPY (EGD);  Surgeon: Rosendo Shaw MD;  Location:  GI     ESOPHAGOSCOPY, GASTROSCOPY, DUODENOSCOPY (EGD), COMBINED N/A 6/11/2020    Procedure: ESOPHAGOGASTRODUODENOSCOPY (EGD);  Surgeon: Rosendo Shaw MD;  Location:  GI     ESOPHAGOSCOPY, GASTROSCOPY, DUODENOSCOPY (EGD), COMBINED N/A 1/23/2021    Procedure: ESOPHAGOGASTRODUODENOSCOPY (EGD);  Surgeon: Pavan Arguello MD;  Location:  GI     HERNIA REPAIR  2017    Abdominal     LAMINECTOMY, FUSION LUMBAR THREE+ LEVEL, COMBINED N/A 9/22/2014    Procedure: COMBINED LAMINECTOMY, FUSION LUMBAR THREE+ LEVEL;  Surgeon: Sebastien Pruitt MD;  Location:  OR     TONSILLECTOMY & ADENOIDECTOMY          Prior to Admission Medications   Prior to Admission Medications   Prescriptions Last Dose Informant Patient Reported? Taking?   QUEtiapine (SEROQUEL) 100 MG tablet   Yes No   Sig: Take 100 mg by mouth At Bedtime Filled 1/11/21 #30   QUEtiapine (SEROQUEL) 50 MG tablet   No No   Sig: Take 1 tablet (50 mg) by mouth 3 times daily as needed (anxiety) Filled 11/20/20 #30   atenolol (TENORMIN) 25 MG tablet   Yes No   Sig: Take 25 mg by mouth daily Filled 9/9/20 #30   fluticasone-vilanterol (BREO ELLIPTA) 200-25 MCG/INH inhaler   No No   Sig: Inhale 1 puff into the lungs daily as needed (SOB)   folic acid (FOLVITE) 1 MG tablet   Yes No   Sig: Take 1 mg by mouth daily   hydrOXYzine (ATARAX) 50 MG tablet   No No   Sig: Take 1 tablet (50 mg) by mouth nightly as needed for other (sleep)   Patient taking differently: Take 50 mg by mouth nightly as needed for other (sleep) Filled 1/11/21 #90   magnesium oxide (MAG-OX) 400 (240 Mg) MG tablet   Yes No   Sig: Take 400 mg by  mouth daily   mirtazapine (REMERON) 30 MG tablet   Yes No   Sig: Take 30 mg by mouth At Bedtime Filled 1/11/21 for #30   multivitamin w/minerals (THERA-VIT-M) tablet   No No   Sig: Take 1 tablet by mouth daily   Patient taking differently: Take 1 tablet by mouth daily Filled 9/21/21 #30   nicotine (COMMIT) 2 MG lozenge   No No   Sig: Place 1 lozenge (2 mg) inside cheek every hour as needed for smoking cessation   Patient taking differently: Place 2 mg inside cheek every hour as needed for smoking cessation Filled 11/21/20 #30   nicotine (NICODERM CQ) 21 MG/24HR 24 hr patch   No No   Sig: Place 1 patch onto the skin daily   Patient taking differently: Place 1 patch onto the skin daily Filled 11/20/21 #30   pantoprazole (PROTONIX) 40 MG EC tablet   No No   Sig: Take 1 tablet (40 mg) by mouth 2 times daily   tamsulosin (FLOMAX) 0.4 MG capsule   No No   Sig: Take 2 capsules (0.8 mg) by mouth daily   traZODone (DESYREL) 100 MG tablet   Yes No   Sig: Take 100 mg by mouth At Bedtime Filled 1/11/21 #30   vortioxetine (TRINTELLIX) 10 MG tablet   No No   Sig: Take 1 tablet (10 mg) by mouth daily   Patient taking differently: Take 10 mg by mouth daily Filled 11/20/20 #30      Facility-Administered Medications: None     Allergies   Allergies   Allergen Reactions     Amlodipine Swelling     Lisinopril      Other reaction(s): Angioedema  Mouth and tongue swelling   Mouth and tongue swelling          SOCIAL HISTORY    Social History     Social History Narrative    , 2 children, lives alone in a town house.  Was in [employer] but on disability related to back injury.  Has a living will and is DNR/DNI. (last updated 6/6/2021)       Social History     Tobacco Use     Smoking status: Current Every Day Smoker     Packs/day: 1.00     Types: Cigarettes     Smokeless tobacco: Never Used     Tobacco comment: Chantix caused nightmares   Substance Use Topics     Alcohol use: Yes     Comment: 1 pint of vodka a day     Drug use: No         FAMILY HISTORY    Family History   Problem Relation Age of Onset     Mental Illness Son      Coronary Artery Disease Early Onset Mother      Substance Abuse Father      Lung Cancer Father      Substance Abuse Brother      Unknown/Adopted No family hx of      Depression No family hx of      Anxiety Disorder No family hx of      Schizophrenia No family hx of      Bipolar Disorder No family hx of      Suicide No family hx of      Dementia No family hx of      Anthony Disease No family hx of      Parkinsonism No family hx of      Autism Spectrum Disorder No family hx of      Intellectual Disability (Mental Retardation) No family hx of         Review of Systems   The 10 point Review of Systems is negative other than noted in the HPI or here.       PHYSICAL EXAM     Temp: 99  F (37.2  C) Temp src: Oral BP: (!) 155/88 Pulse: 74   Resp: 18 SpO2: 97 % O2 Device: None (Room air)    Vital Signs with Ranges  Temp:  [99  F (37.2  C)] 99  F (37.2  C)  Pulse:  [74-89] 74  Resp:  [18] 18  BP: (123-156)/(57-90) 155/88  SpO2:  [95 %-97 %] 97 %  0 lbs 0 oz    Constitutional: Awake, alert, cooperative, moderate distress from pain  Eyes: Conjunctiva and pupils examined and normal.  HEENT: Moist mucous membranes, normal dentition.  Respiratory: Clear to auscultation bilaterally, no crackles or wheezing.  Cardiovascular: Regular rate and rhythm, normal S1 and S2, and no murmur noted, no carotid bruits.  1+ bilateral ankle edema.   GI: Soft, moderately distended, and most tender in LUQ and spleen tip possibly palpable several cm below left costal margin, and mild RUQ tenderness, and bowel sounds present.   Lymph/Hematologic: No anterior cervical, supraclavicular or axillary adenopathy.  Skin: No rashes, no cyanosis.   Musculoskeletal: No joint swelling, erythema or tenderness.  Neurologic: Alert, Ox3, Cranial nerves 2-12 intact, no focal weakness or numbness  Psychiatric:  No obvious anxiety or depression.    Data   Data reviewed  today:  I personally reviewed no images or EKG's today.  Recent Labs   Lab 06/06/21  0600   WBC 3.6*   HGB 7.4*      *   INR 1.31*      POTASSIUM 4.0   CHLORIDE 114*   CO2 19*   BUN 32*   CR 1.98*   ANIONGAP 8   KEV 7.6*   GLC 81   ALBUMIN 2.2*   PROTTOTAL 5.8*   BILITOTAL 0.3   ALKPHOS 160*   ALT 15   AST 31       Imaging:  No results found for this or any previous visit (from the past 24 hour(s)).

## 2021-06-06 NOTE — ED TRIAGE NOTES
Pint of vodka last night. Fell out of bed. Too weak to get back up. No CP/LOC. Abdomen distended. Gets tapped approx every 2 weeks. About due for another tap.

## 2021-06-06 NOTE — PROGRESS NOTES
.RECEIVING UNIT ED HANDOFF REVIEW    ED Nurse Handoff Report was reviewed by: Martín Cali RN on June 6, 2021 at 12:24 PM

## 2021-06-06 NOTE — ED PROVIDER NOTES
History   Chief Complaint:  Abdominal pain     HPI   Jim Richard is a 66 year old male with history of alcohol abuse, alcoholic cirrhosis, recurrent ascites, who presents with abdominal pain. He admits to drinking a pint of vodka last night. This morning, he rolled out of bed and was unable to get up, so he called 911.  He was at [facility] but has been living at home for the last month or so.  He mentions that he has a distended abdomin that he gets tapped every two weeks.  He is having increased abdominal pain from the distension. His last tap was last week.  He has not had any other recent falls, and does not think he hit his head. Of note, he and his  talked about living in an assisted living facility, but he is worries about the money.         Review of Systems   Constitutional: Negative for chills and fever.   Respiratory: Negative for cough, shortness of breath and wheezing.    Gastrointestinal: Positive for abdominal pain. Negative for blood in stool, diarrhea and vomiting.   Neurological: Positive for weakness. Negative for headaches.   All other systems reviewed and are negative.      Allergies:  Amlodipine  Lisinopril    Medications:  Tenormin  Breo ellipta  Folvite  Atarax  Mag-ox  Remeron  Nicotine  Protonix  Seroquel  Flomax  Desyrel  Trintellix  Aldactone  Potassium chloride  Robaxin  Augmentin  Albuterol    Past Medical History:    Alcoholism  Anxiety  COPD  CAD  Depression  Esophageal carices   Heart attack  Hepatomegaly  Hyperlipemia  Hypertension  JANIS on CPAP  Peripheral vascular disease  PVD  SDH  Spinal stenosis  Substance abuse  Syncope  Pancytopenia  CKD  Liver disease  NGUYEN  Thrombocytopenia  Lactic acidosis  Colitis  Bradycardia  GI bleeding  Anemia  Alcoholic cirrhosis of liver  Hepatitis  Suicidal ideation  Hyperlipidemia  PAD    Past Surgical History:    Appendectomy  Bypass graft femoropopliteal  Colonoscopy  Endarterectomy femoral  EGD  Hernia  repair  Laminectomy  Tonsillectomy  US paracentesis    Family History:    Mother: CAD  Father: substance abuse, Lung cancer  Brother: Substance abuse    Social History:  The patient presents alone. He is a drinker.     Physical Exam     Patient Vitals for the past 24 hrs:   BP Temp Temp src Pulse Resp SpO2   06/06/21 1000 139/85 -- -- 80 -- --   06/06/21 0900 (!) 156/90 -- -- 76 -- --   06/06/21 0800 123/57 -- -- 74 -- --   06/06/21 0700 136/78 -- -- 85 -- 95 %   06/06/21 0615 (!) 146/89 -- -- 84 -- 97 %   06/06/21 0600 (!) 148/77 99  F (37.2  C) Oral 89 18 96 %       Physical Exam  Gen:  Alert, appears chronically ill.    Eye:   Pupils are equal, round, and reactive.     Sclera non-injected.  Sclera non-icteric.    ENT:   Moist mucus membranes.     Normal tongue.    No jaundice.    Cardiac:     Normal rate and regular rhythm.    No murmurs, gallops, or rubs.    Pulmonary:     Clear to auscultation bilaterally.    No wheezes, rales, or rhonchi.    Abdomen:     Normal active bowel sounds.     Abdomen distended with diffuse tenderness.   Umbilical hernia.    Musculoskeletal:     Normal movement of all extremities without evidence for deficit.    Extremities:    No edema.  Wasting of muscles in extremities.    Skin:   Warm and dry.  Multiple bruises to upper arms.    Neurologic:    Alert, conversational.   Moving all extremities equally.    Psychiatric:     Normal affect with appropriate interaction with examiner.    Emergency Department Course     Laboratory:  CBC: WBC 3.6 (L), HGB 7.4 (L),  (L)     CMP: Chloride: 114 (H), Carbon dioxide: 19 (L), Bun: 32 (H) o/w WNL (Creatinine 1.98 (H))     INR: 1.31 (H)    Alcohol level: 0.16 (H)    Asymptomatic Covid-19 virus by PCR: Negative    UA with microscopic:L Pending    Cell count with differential fluid: Pending    Procedures:  [facility]    -Paracentesis    Date/Time: 6/6/2021 10:48 AM  Performed by: Ermelinda Pack MD  Authorized by:  Ermelinda Pack MD       PRE-PROCEDURE DETAILS     Procedure purpose:  Diagnostic    ANESTHESIA (see MAR for exact dosages):     Anesthesia method:  Local infiltration    Local anesthetic:  Lidocaine 1% w/o epi    PROCEDURE DETAILS     Needle gauge:  18    Ultrasound guidance: yes      Puncture site:  R lower quadrant    Fluid appearance:  Ivania    Dressing:  Adhesive bandage    PROCEDURE   Patient Tolerance:  Patient tolerated the procedure well with no immediate complications        Emergency Department Course:    Reviewed:  I reviewed nursing notes, vitals, past medical history and care everywhere    Assessments:  0631 I obtained history and examined the patient as noted above.     0750 I rechecked the patient and explained findings.     Consults:   1053 I spoke with Dr. Burns, Hospitalist, who accepted admission of the patient.    Interventions:  Albumin 25%    Disposition:  The patient was admitted to the hospital under the care of Dr. Burns.       Impression & Plan     Medical Decision Making:  Jim Richard is a 66 year old male with history of alcohol abuse, alcoholic cirrhosis of liver, ascides, CKI, who presents today with generalized weakness, abdominal distension, and abdominal pain. He has been requiring bi weekly to weekly paracentesis. Per chart review, it appears that his  has been recommending that he get admitted to the hospital for placement. On exam, the patient is afebrile, with normal oxygen saturation. He has tegmata liver disease include large ascides. Labs demonstrated worsening renal function, with creatine of 1.98 compared to 1.58 little over a month ago. Otherwise, he is slightly more anemic and thrombocytopenic compared to prior. INR seems to be climbing. Bilirubin is stable. Blood alcohol is positive 0.16. Paracentesis was performed and lab tested are still pending. The patient will be admitted for continued evaluation of liver disease, worsening renal function,  possibly placement.       Covid-19  Jim Richard was evaluated during a global COVID-19 pandemic, which necessitated consideration that the patient might be at risk for infection with the SARS-CoV-2 virus that causes COVID-19.   Applicable protocols for evaluation were followed during the patient's care.   COVID-19 was considered as part of the patient's evaluation. The plan for testing is:  a test was obtained during this visit.    Diagnosis:    ICD-10-CM    1. Acute renal failure, unspecified acute renal failure type (H)  N17.9 Cell count with differential fluid     Fluid Culture Aerobic Bacterial     Gram stain   2. End-stage liver disease (H)  K72.90        Discharge Medications:  New Prescriptions    No medications on file       Scribe Disclosure:  Emeka VALENTE, am serving as a scribe at 6:31 AM on 6/6/2021 to document services personally performed by Ermelinda Pack MD based on my observations and the provider's statements to me.            Ermelinda Pack MD  06/06/21 [id number]

## 2021-06-07 ENCOUNTER — APPOINTMENT (OUTPATIENT)
Dept: ULTRASOUND IMAGING | Facility: CLINIC | Age: 66
DRG: 441 | End: 2021-06-07
Attending: INTERNAL MEDICINE
Payer: MEDICARE

## 2021-06-07 LAB
ANION GAP SERPL CALCULATED.3IONS-SCNC: 5 MMOL/L (ref 3–14)
ANION GAP SERPL CALCULATED.3IONS-SCNC: 7 MMOL/L (ref 3–14)
BUN SERPL-MCNC: 29 MG/DL (ref 7–30)
BUN SERPL-MCNC: 29 MG/DL (ref 7–30)
CALCIUM SERPL-MCNC: 7.8 MG/DL (ref 8.5–10.1)
CALCIUM SERPL-MCNC: 8.1 MG/DL (ref 8.5–10.1)
CHLORIDE SERPL-SCNC: 105 MMOL/L (ref 94–109)
CHLORIDE SERPL-SCNC: 109 MMOL/L (ref 94–109)
CO2 SERPL-SCNC: 20 MMOL/L (ref 20–32)
CO2 SERPL-SCNC: 21 MMOL/L (ref 20–32)
CREAT SERPL-MCNC: 1.91 MG/DL (ref 0.66–1.25)
CREAT SERPL-MCNC: 2.03 MG/DL (ref 0.66–1.25)
ERYTHROCYTE [DISTWIDTH] IN BLOOD BY AUTOMATED COUNT: 18.2 % (ref 10–15)
GFR SERPL CREATININE-BSD FRML MDRD: 33 ML/MIN/{1.73_M2}
GFR SERPL CREATININE-BSD FRML MDRD: 35 ML/MIN/{1.73_M2}
GLUCOSE SERPL-MCNC: 134 MG/DL (ref 70–99)
GLUCOSE SERPL-MCNC: 77 MG/DL (ref 70–99)
HCT VFR BLD AUTO: 24 % (ref 40–53)
HEMOCCULT STL QL: NEGATIVE
HGB BLD-MCNC: 7.8 G/DL (ref 13.3–17.7)
MCH RBC QN AUTO: 32.5 PG (ref 26.5–33)
MCHC RBC AUTO-ENTMCNC: 32.5 G/DL (ref 31.5–36.5)
MCV RBC AUTO: 100 FL (ref 78–100)
PLATELET # BLD AUTO: 109 10E9/L (ref 150–450)
POTASSIUM SERPL-SCNC: 3.9 MMOL/L (ref 3.4–5.3)
POTASSIUM SERPL-SCNC: 4.2 MMOL/L (ref 3.4–5.3)
PROCALCITONIN SERPL-MCNC: 0.12 NG/ML
RBC # BLD AUTO: 2.4 10E12/L (ref 4.4–5.9)
SODIUM SERPL-SCNC: 131 MMOL/L (ref 133–144)
SODIUM SERPL-SCNC: 136 MMOL/L (ref 133–144)
WBC # BLD AUTO: 3.7 10E9/L (ref 4–11)

## 2021-06-07 PROCEDURE — 250N000013 HC RX MED GY IP 250 OP 250 PS 637: Performed by: INTERNAL MEDICINE

## 2021-06-07 PROCEDURE — 80048 BASIC METABOLIC PNL TOTAL CA: CPT | Performed by: INTERNAL MEDICINE

## 2021-06-07 PROCEDURE — 84145 PROCALCITONIN (PCT): CPT | Performed by: INTERNAL MEDICINE

## 2021-06-07 PROCEDURE — P9047 ALBUMIN (HUMAN), 25%, 50ML: HCPCS | Performed by: INTERNAL MEDICINE

## 2021-06-07 PROCEDURE — 258N000002 HC RX IP 258 OP 250: Performed by: INTERNAL MEDICINE

## 2021-06-07 PROCEDURE — 99233 SBSQ HOSP IP/OBS HIGH 50: CPT | Performed by: INTERNAL MEDICINE

## 2021-06-07 PROCEDURE — 250N000011 HC RX IP 250 OP 636: Performed by: INTERNAL MEDICINE

## 2021-06-07 PROCEDURE — 36415 COLL VENOUS BLD VENIPUNCTURE: CPT | Performed by: INTERNAL MEDICINE

## 2021-06-07 PROCEDURE — 120N000001 HC R&B MED SURG/OB

## 2021-06-07 PROCEDURE — 999N000154 HC STATISTIC RADIOLOGY XRAY, US, CT, MAR, NM

## 2021-06-07 PROCEDURE — 85027 COMPLETE CBC AUTOMATED: CPT | Performed by: INTERNAL MEDICINE

## 2021-06-07 PROCEDURE — 272N000706 US PARACENTESIS

## 2021-06-07 PROCEDURE — 250N000009 HC RX 250: Performed by: RADIOLOGY

## 2021-06-07 PROCEDURE — 0W9G3ZZ DRAINAGE OF PERITONEAL CAVITY, PERCUTANEOUS APPROACH: ICD-10-PCS | Performed by: PHYSICIAN ASSISTANT

## 2021-06-07 PROCEDURE — 82272 OCCULT BLD FECES 1-3 TESTS: CPT | Performed by: INTERNAL MEDICINE

## 2021-06-07 RX ORDER — ALBUMIN (HUMAN) 12.5 G/50ML
12.5 SOLUTION INTRAVENOUS EVERY 8 HOURS
Status: COMPLETED | OUTPATIENT
Start: 2021-06-07 | End: 2021-06-08

## 2021-06-07 RX ORDER — DEXTROSE MONOHYDRATE 25 G/50ML
25-50 INJECTION, SOLUTION INTRAVENOUS
Status: DISCONTINUED | OUTPATIENT
Start: 2021-06-07 | End: 2021-06-17 | Stop reason: HOSPADM

## 2021-06-07 RX ORDER — NICOTINE POLACRILEX 4 MG
15-30 LOZENGE BUCCAL
Status: DISCONTINUED | OUTPATIENT
Start: 2021-06-07 | End: 2021-06-17 | Stop reason: HOSPADM

## 2021-06-07 RX ORDER — CEFTRIAXONE 1 G/1
1 INJECTION, POWDER, FOR SOLUTION INTRAMUSCULAR; INTRAVENOUS EVERY 24 HOURS
Status: COMPLETED | OUTPATIENT
Start: 2021-06-07 | End: 2021-06-13

## 2021-06-07 RX ORDER — LIDOCAINE 40 MG/G
CREAM TOPICAL
Status: DISCONTINUED | OUTPATIENT
Start: 2021-06-07 | End: 2021-06-17 | Stop reason: HOSPADM

## 2021-06-07 RX ORDER — HALOPERIDOL 5 MG/ML
2.5-5 INJECTION INTRAMUSCULAR EVERY 6 HOURS PRN
Status: DISCONTINUED | OUTPATIENT
Start: 2021-06-07 | End: 2021-06-17 | Stop reason: HOSPADM

## 2021-06-07 RX ORDER — SODIUM CHLORIDE 450 MG/100ML
INJECTION, SOLUTION INTRAVENOUS CONTINUOUS
Status: DISCONTINUED | OUTPATIENT
Start: 2021-06-07 | End: 2021-06-08

## 2021-06-07 RX ORDER — LORAZEPAM 2 MG/ML
1-2 INJECTION INTRAMUSCULAR EVERY 30 MIN PRN
Status: DISCONTINUED | OUTPATIENT
Start: 2021-06-07 | End: 2021-06-17 | Stop reason: HOSPADM

## 2021-06-07 RX ORDER — LIDOCAINE HYDROCHLORIDE 10 MG/ML
10 INJECTION, SOLUTION EPIDURAL; INFILTRATION; INTRACAUDAL; PERINEURAL ONCE
Status: COMPLETED | OUTPATIENT
Start: 2021-06-07 | End: 2021-06-07

## 2021-06-07 RX ORDER — FLUMAZENIL 0.1 MG/ML
0.2 INJECTION, SOLUTION INTRAVENOUS
Status: DISCONTINUED | OUTPATIENT
Start: 2021-06-07 | End: 2021-06-17 | Stop reason: HOSPADM

## 2021-06-07 RX ORDER — OLANZAPINE 5 MG/1
5-10 TABLET, ORALLY DISINTEGRATING ORAL EVERY 6 HOURS PRN
Status: DISCONTINUED | OUTPATIENT
Start: 2021-06-07 | End: 2021-06-17 | Stop reason: HOSPADM

## 2021-06-07 RX ORDER — ALBUMIN (HUMAN) 12.5 G/50ML
12.5 SOLUTION INTRAVENOUS ONCE
Status: DISCONTINUED | OUTPATIENT
Start: 2021-06-07 | End: 2021-06-07

## 2021-06-07 RX ORDER — MULTIPLE VITAMINS W/ MINERALS TAB 9MG-400MCG
1 TAB ORAL DAILY
Status: DISCONTINUED | OUTPATIENT
Start: 2021-06-07 | End: 2021-06-17 | Stop reason: HOSPADM

## 2021-06-07 RX ORDER — LORAZEPAM 1 MG/1
1-2 TABLET ORAL EVERY 30 MIN PRN
Status: DISCONTINUED | OUTPATIENT
Start: 2021-06-07 | End: 2021-06-17 | Stop reason: HOSPADM

## 2021-06-07 RX ADMIN — FOLIC ACID 1 MG: 1 TABLET ORAL at 08:37

## 2021-06-07 RX ADMIN — ALBUMIN HUMAN 12.5 G: 0.25 SOLUTION INTRAVENOUS at 11:30

## 2021-06-07 RX ADMIN — ALBUMIN HUMAN 12.5 G: 0.25 SOLUTION INTRAVENOUS at 17:44

## 2021-06-07 RX ADMIN — OXYCODONE HYDROCHLORIDE 10 MG: 5 TABLET ORAL at 20:23

## 2021-06-07 RX ADMIN — MIRTAZAPINE 30 MG: 15 TABLET, FILM COATED ORAL at 21:33

## 2021-06-07 RX ADMIN — GABAPENTIN 300 MG: 300 CAPSULE ORAL at 21:34

## 2021-06-07 RX ADMIN — MULTIPLE VITAMINS W/ MINERALS TAB 1 TABLET: TAB at 12:55

## 2021-06-07 RX ADMIN — TAMSULOSIN HYDROCHLORIDE 0.8 MG: 0.4 CAPSULE ORAL at 08:37

## 2021-06-07 RX ADMIN — PANTOPRAZOLE SODIUM 40 MG: 40 TABLET, DELAYED RELEASE ORAL at 08:37

## 2021-06-07 RX ADMIN — QUETIAPINE FUMARATE 200 MG: 100 TABLET ORAL at 21:33

## 2021-06-07 RX ADMIN — TRAZODONE HYDROCHLORIDE 100 MG: 100 TABLET ORAL at 21:34

## 2021-06-07 RX ADMIN — GABAPENTIN 300 MG: 300 CAPSULE ORAL at 08:37

## 2021-06-07 RX ADMIN — CARBIDOPA AND LEVODOPA 2.5 MG: 50; 200 TABLET, EXTENDED RELEASE ORAL at 17:37

## 2021-06-07 RX ADMIN — VORTIOXETINE 10 MG: 10 TABLET, FILM COATED ORAL at 08:37

## 2021-06-07 RX ADMIN — QUETIAPINE FUMARATE 50 MG: 25 TABLET ORAL at 10:37

## 2021-06-07 RX ADMIN — OXYCODONE HYDROCHLORIDE 10 MG: 5 TABLET ORAL at 15:44

## 2021-06-07 RX ADMIN — HYDROMORPHONE HYDROCHLORIDE 0.5 MG: 1 INJECTION, SOLUTION INTRAMUSCULAR; INTRAVENOUS; SUBCUTANEOUS at 09:26

## 2021-06-07 RX ADMIN — CARBIDOPA AND LEVODOPA 2.5 MG: 50; 200 TABLET, EXTENDED RELEASE ORAL at 12:55

## 2021-06-07 RX ADMIN — CARBIDOPA AND LEVODOPA 2.5 MG: 50; 200 TABLET, EXTENDED RELEASE ORAL at 08:37

## 2021-06-07 RX ADMIN — GABAPENTIN 300 MG: 300 CAPSULE ORAL at 15:42

## 2021-06-07 RX ADMIN — VORTIOXETINE 10 MG: 10 TABLET, FILM COATED ORAL at 20:23

## 2021-06-07 RX ADMIN — THIAMINE HCL TAB 100 MG 100 MG: 100 TAB at 08:37

## 2021-06-07 RX ADMIN — LIDOCAINE HYDROCHLORIDE 10 ML: 10 INJECTION, SOLUTION EPIDURAL; INFILTRATION; INTRACAUDAL; PERINEURAL at 14:06

## 2021-06-07 RX ADMIN — SODIUM CHLORIDE: 4.5 INJECTION, SOLUTION INTRAVENOUS at 09:14

## 2021-06-07 RX ADMIN — PANTOPRAZOLE SODIUM 40 MG: 40 TABLET, DELAYED RELEASE ORAL at 20:23

## 2021-06-07 RX ADMIN — OXYCODONE HYDROCHLORIDE 10 MG: 5 TABLET ORAL at 01:19

## 2021-06-07 RX ADMIN — CEFTRIAXONE SODIUM 1 G: 1 INJECTION, POWDER, FOR SOLUTION INTRAMUSCULAR; INTRAVENOUS at 10:35

## 2021-06-07 ASSESSMENT — ACTIVITIES OF DAILY LIVING (ADL)
ADLS_ACUITY_SCORE: 14
ADLS_ACUITY_SCORE: 13
ADLS_ACUITY_SCORE: 14
ADLS_ACUITY_SCORE: 13

## 2021-06-07 ASSESSMENT — MIFFLIN-ST. JEOR: SCORE: 1480.7

## 2021-06-07 NOTE — PROGRESS NOTES
[facility]    HOSPITALIST PROGRESS NOTE :   --------------------------------------------------    Date of Admission:  6/6/2021    Cumulative Summary: Jim Richard is a 66 year old male with past medical history significant for persistent alcohol abuse, alcoholic cirrhosis, recurrent ascites requiring weekly to biweekly paracentesis, COPD, CAD, esophageal varices, essential hypertension, peripheral vascular disease, pancytopenia, chronic kidney disease or chronic thrombocytopenia and anemia presented to the emergency room on June 5th due to the concern for new and acute abdominal pain with distention associated with ascites.  Patient admitted drinking a pint of vodka the night before.    Assessment & Plan     Acute generalized abdominal pain: Concern for possible spontaneous bacterial peritonitis.  Acute renal failure on top of chronic kidney disease; low urine output, patient does not seem to be on any diuretics at home, denying following up with gastroenterology as an outpatient, at this time there is also concern for development of hepatorenal syndrome.  Recurrent large-volume ascites due to alcoholic liver disease: According to patient, he is requiring paracentesis every 2 weeks and usually requires anywhere from 5 to 7 L of fluid removal.  According to patient, he underwent fluid removal in the emergency department where couple of liters of fluid was removed.  This morning patient remains significantly uncomfortable with distended abdomen, denying any similar pain in the past with the presence of ascites only.  Initial fluid analysis showing only 10 WBC count does not seem to be concerning for SBP.  Alcoholic liver cirrhosis; unfortunately patient continues to consume alcohol and admits consuming 1 pint of vodka the night before coming to the hospital.  Discussed with him regarding my concern for significantly associated morbidity and mortality with ongoing alcohol  consumption  End-stage liver disease  Pancytopenia associated with liver cirrhosis  Acute on chronic anemia: Patient is denying any signs and symptoms of bleeding, hemoglobin was down to 7.6.    --Admit patient to general medical floor.  --Long discussion with patient regarding his current clinical status, patient seems to be slightly tenuous situation with low urine output, acute kidney injury and ongoing abdominal pain with significant distention.  --Patient has poor oral intake, will start patient on half-normal saline gently to hydrate kidneys and recheck BMP at 1800.  --Discussed with bedside nursing regarding doing bladder scan which is concerning for over 800 mL of urinary retention, will place Leger catheter will also check strict intake and output.  --Due to the concern for possible SBP and hepatorenal syndrome, I will start patient on albumin 25 g every 8 hours till patient is further evaluated by GI.  --We will consult gastroenterology, patient has been seen by Dr. Shaw in the past but has not followed up as an outpatient.  --Considering Main culture results are pending, will start patient on ceftriaxone although low probability of SBP considering initial fluid analysis.  --We will send patient further down for ultrasound-guided paracentesis for therapeutic reasons as usually patient requires 5 to 7 L of fluid removal and has only got about couple of liters of fluid removed in the emergency room.  --Patient does not seem to be on any home diuretics, and probably will not be able to get started on any diuretics at this time due to renal functions.  --Follow-up on fluid cultures from paracentesis from yesterday.  --We will check fecal occult blood test due to drop in hemoglobin.  --Nexium has been a started, continue.  --Check BMP at 1800.  --Recheck CMP tomorrow morning.  --We will check fecal occult blood test  --Continue patient on midodrine 2.5 mg p.o. 3 times daily  --If continues to have significant  discomfort, might need to do some imaging    Acute urinary retention  History of benign prostate hyperplasia  --Patient is retaining more than 800 mL this morning, also have acute renal failure, unable to pass any urine, will place Leger catheter.  --Strict intake and output.  --Continue patient on Flomax 0.8 mg p.o. daily.    Acute renal failure on top of CKD stage III-IV: His creatinine at baseline seems to be close to 1.6, now presented creatinine of 1.98, low urine output.  Concern for prerenal azotemia, also large volume paracentesis causing obstruction versus concern for going in the direction of hepatorenal syndrome.    --As above start patient on gentle half-normal saline to prevent significant salt intake due to large ascites.  --Plan to stop fluids later.  --Place Leger catheter.  --Recheck BMP this evening.  --Avoid nephrotoxic agents.    History of toxic encephalopathy from alcohol intoxication: Currently no signs and symptoms of encephalopathy this morning, seems to be able to answer all the questions.  Patient remains at very high risk for withdrawal though no signs seen thus far.    --We will order CIWA with Ativan.  --Will order multivitamin.  --Continue patient on thiamine and folic acid.  --Patient will not be started at this time on any clonidine  --We will continue patient on PTA dose of gabapentin 300 mg p.o. 3 times a day but will not give patient high dose of tapering Neurontin at this point.    Essential hypertension  -- Patient does not seem to be taking any antihypertensive medication, continue to monitor.    COPD  -- Continue PTA Breo Ellipta 200/25 1 puff daily as needed.    Obstructive sleep apnea  --Continue CPAP    Chronic depression and anxiety  --Continue PTA Vortioxetine ,mirtazapine and trazodone and Seroquel    Diet: Regular Diet Adult    Leger Catheter: not present    DVT Prophylaxis: Pneumatic Compression Devices  Code Status: No CPR- Do NOT Intubate    The patient's care was  discussed with the Bedside Nurse, Care Coordinator/ and Patient.    Disposition Plan   Expected discharge: 2 - 3 days,will order PT and OT , might need to be seen by chemical dependency , will have further discussion with him tomorrow , might qualify for commitment as he continues to present with medical complicaitons   More than 35 min of the time was spend in care today, more than 50% of the time was spent in patient care coordination and counseling.    Tiff Rodriguez MD, FACP  Text Page (7am - 6pm)    ----------------------------------------------------------------------------------------------------------------------    Interval History   Care was assumed this morning, patient was seen and examined, does not feel very well this morning, continues to have abdominal discomfort, according to patient couple of liters of fluid was removed yesterday in the ER, abdomen is still quite distended usually gets 5 to 7 L removed and is in agreement if further fluid can be removed today.  He is denying any fever or chills, denying any abdominal pain associated with ascites in the past.  Discussed with him the results of fluid analysis but is still a starting him on antibiotics, discussed about gastroenterology consultation, administration of albumin, holding diuretics, starting IV fluids, and hardness of his staying abstinent from alcohol.  Plan of care was also discussed in detail with bedside nursing due to his tenuous clinical status, also have no urine output in the last 12 to 15 hours, we discussed about doing bladder scan which showed urinary retention and plan on placing Leger catheter.    -Data reviewed today: I reviewed all new labs and imaging results over the last 24 hours.    I personally reviewed no images or EKG's today.    Physical Exam   Temp: 98.6  F (37  C) Temp src: Oral BP: 137/72 Pulse: 66   Resp: 20 SpO2: 95 % O2 Device: None (Room air)    Vitals:    06/06/21 1301   Weight: 86.2 kg (190 lb)      Vital Signs with Ranges  Temp:  [97.6  F (36.4  C)-98.7  F (37.1  C)] 98.6  F (37  C)  Pulse:  [66-83] 66  Resp:  [20-22] 20  BP: (121-155)/(69-88) 137/72  SpO2:  [95 %-99 %] 95 %  No intake/output data recorded.    GENERAL: Alert , awake and oriented. NAD. Conversational, appropriate.  Looks chronically sick  HEENT: Normocephalic. EOMI. No icterus or injection. Nares normal.   LUNGS: Clear to auscultation with decreased air entry in the bases, no wheezing, no crackles  HEART: Regular rate. Extremities perfused.   ABDOMEN: Firm and distended with fluid thrill, generalized tenderness no guarding or rigidity, no rebound tenderness at this point.  EXTREMITIES: No LE edema noted.   NEUROLOGIC: Moves extremities x4 on command. No acute focal neurologic abnormalities noted.     Medications     NaCl 75 mL/hr at 06/07/21 0914       albumin human  12.5 g Intravenous Q8H     cefTRIAXone  1 g Intravenous Q24H     folic acid  1 mg Oral Daily     gabapentin  300 mg Oral TID     midodrine  2.5 mg Oral TID     mirtazapine  30 mg Oral At Bedtime     nicotine   Transdermal Q8H     pantoprazole  40 mg Oral BID     QUEtiapine  200 mg Oral At Bedtime     sodium chloride (PF)  3 mL Intracatheter Q8H     tamsulosin  0.8 mg Oral Daily     vitamin B1  100 mg Oral Daily     traZODone  100 mg Oral At Bedtime     vortioxetine  10 mg Oral BID       Data   Recent Labs   Lab 06/07/21  0839 06/06/21  0600   WBC 3.7* 3.6*   HGB 7.8* 7.4*    100   * 111*   INR  --  1.31*    141   POTASSIUM 3.9 4.0   CHLORIDE 109 114*   CO2 20 19*   BUN 29 32*   CR 1.91* 1.98*   ANIONGAP 7 8   KEV 8.1* 7.6*   GLC 77 81   ALBUMIN  --  2.2*   PROTTOTAL  --  5.8*   BILITOTAL  --  0.3   ALKPHOS  --  160*   ALT  --  15   AST  --  31       Imaging:   No results found for this or any previous visit (from the past 24 hour(s)).

## 2021-06-07 NOTE — PROVIDER NOTIFICATION
MD Notification    Notified Person: MD    Notified Person Name: Dr Rodriguez    Notification Date/Time:6/7/21 1047    Notification Interaction:web page    Purpose of Notification: bladder scan shows 796ml, do you want to place jiang or straight cath for now    Orders Received:    Comments:

## 2021-06-07 NOTE — PLAN OF CARE
DATE & TIME: 6/7/21 7-3pm              Cognitive Concerns/ Orientation : A&O x 4    BEHAVIOR & AGGRESSION TOOL COLOR: green  CIWA SCORE: NA      ABNL VS/O2: VSS on RA  MOBILITY: Up A1 GB/walker, up in chair for meals.  PAIN MANAGMENT: Dilaudid IV X1, can have 10 mg Oxy prn abdominal pain  DIET: Regular  BOWEL/BLADDER: Leger placed/bladder scan showed 798cc.  DRAIN/DEVICES: PIV  !/2 NS @75cc/hr  TELEMETRY RHYTHM:   TESTS/PROCEDURES: US paracentesis at 1400  D/C DATE: Abdomen distended, scattered bruises to both arms, abrasion to knee/back.  Albumin & IV rocephin ordered. Discharge pending

## 2021-06-07 NOTE — CONSULTS
[facility]  Gastroenterology Consultation         Jim Richard  [address] [address]  66 year old male    Admission Date/Time: 6/6/2021  Primary Care Provider: Talon Elias  Referring / Attending Physician:  Dr. Tiff Rodriguez    We were asked to see the patient in consultation by Dr. Tiff Rodriguez for evaluation of severe abdominal pain.      CC: abdominal pain    HPI:  Jim Richard is a 89 year old male who has a past medical history of numerous admissions for alcohol intoxication complicated by decompensated alcoholic liver cirrhosis with esophageal varices, abdominal ascites, GI bleed, hepatic encephalopathy and hepatorenal syndrome as well as depression, anxiety, sleep apnea, COPD and anemia. He has been admitted with c/o abdominal pain and distended abdomen. Has been getting q 2 weeks paracentesis. He was unable to get out of bed prompting visit to ED.    After last admission was admitted to sober living facility and possibly kicked out and has been drinking 1 pt of vodka daily over last month. He has been non compliant with medications. He failed to f/u in the outpatient setting. He has been admitted numerous times in last year with multiple ED visits.  Last EGD 01/2021 noted grade I esophageal varices. Last paracentesis 5/28 and removed 6.4 L of clear fluid.    Temp 99.0, Creat 1.91 (was 1.58 in 5/1/21), WBC 3.7, Hgb 7,8 (stable and at baseline), platelets 109K, and LFT's normal except Alk Phos 160, and ETOH 0.16.    ROS: A comprehensive ten point review of systems was negative aside from those in mentioned in the HPI.      PAST MED HX:  I have reviewed this patient's medical history and updated it with pertinent information if needed.   Past Medical History:   Diagnosis Date     Alcoholism (H) 1/14/2013    had treatment at [facility]     Anxiety      COPD (chronic obstructive pulmonary disease) (H)      Coronary artery disease 09/09/2014     Nuclear stress test with slight fixed defect inferiorly, no ischemia     Depression     w/anxiety     Esophageal varices with bleeding 04/28/2018    6 varices banded on EGD     Heart attack (H)      Hepatomegaly     Fatty liver, chronic alcoholic     Hyperlipemia      Hypertension      JANIS on CPAP      Peripheral vascular disease (H)      PVD (peripheral vascular disease) (H)      SDH (subdural hematoma) (H) 04/26/2018    Right side, resolved spontaneously     Spinal stenosis      Substance abuse (H)        MEDICATIONS:   Prior to Admission Medications   Prescriptions Last Dose Informant Patient Reported? Taking?   QUEtiapine (SEROQUEL) 200 MG tablet 6/5/2021 at Unknown time Self Yes Yes   Sig: Take 200 mg by mouth At Bedtime Filled 1/11/21 #30    QUEtiapine (SEROQUEL) 50 MG tablet 6/5/2021 at Unknown time Self No Yes   Sig: Take 1 tablet (50 mg) by mouth 3 times daily as needed (anxiety) Filled 11/20/20 #30   albuterol (PROAIR HFA/PROVENTIL HFA/VENTOLIN HFA) 108 (90 Base) MCG/ACT inhaler More than a month at Unknown time Self Yes No   Sig: Inhale 1-2 puffs into the lungs every 4 hours as needed for shortness of breath / dyspnea or wheezing   diazepam (VALIUM) 10 MG tablet Past Week at Unknown time Self Yes Yes   Sig: Take 10 mg by mouth every 12 hours as needed for anxiety   eszopiclone (LUNESTA) 3 MG tablet new Self Yes Yes   Sig: Take 3 mg by mouth nightly as needed for sleep   fluticasone-vilanterol (BREO ELLIPTA) 200-25 MCG/INH inhaler Past Month at Unknown time Self No Yes   Sig: Inhale 1 puff into the lungs daily as needed (SOB)   folic acid (FOLVITE) 1 MG tablet 6/5/2021 at Unknown time Self Yes Yes   Sig: Take 1 mg by mouth daily   gabapentin (NEURONTIN) 300 MG capsule 6/5/2021 at Unknown time Self Yes Yes   Sig: Take 300 mg by mouth 3 times daily   hydrOXYzine (ATARAX) 50 MG tablet Past Week at Unknown time Self No Yes   Sig: Take 1 tablet (50 mg) by mouth nightly as needed for other (sleep)   Patient  taking differently: Take 50 mg by mouth nightly as needed for other (sleep) Filled 1/11/21 #90   magnesium oxide (MAG-OX) 400 (240 Mg) MG tablet 6/5/2021 at Unknown time Self Yes Yes   Sig: Take 400 mg by mouth daily   midodrine (PROAMATINE) 2.5 MG tablet 6/5/2021 at Unknown time Self Yes Yes   Sig: Take 2.5 mg by mouth 3 times daily   mirtazapine (REMERON) 30 MG tablet 6/5/2021 at Unknown time Self Yes Yes   Sig: Take 30 mg by mouth At Bedtime Filled 1/11/21 for #30   multivitamin w/minerals (THERA-VIT-M) tablet 6/5/2021 at Unknown time Self No Yes   Sig: Take 1 tablet by mouth daily   Patient taking differently: Take 1 tablet by mouth daily Filled 9/21/21 #30   nicotine (NICODERM CQ) 21 MG/24HR 24 hr patch  Self Yes Yes   Sig: Place 1 patch onto the skin every 24 hours   pantoprazole (PROTONIX) 40 MG EC tablet 6/5/2021 at Unknown time Self No Yes   Sig: Take 1 tablet (40 mg) by mouth 2 times daily   tamsulosin (FLOMAX) 0.4 MG capsule Past Week at Unknown time Self No Yes   Sig: Take 2 capsules (0.8 mg) by mouth daily   traZODone (DESYREL) 100 MG tablet 6/5/2021 at Unknown time Self Yes Yes   Sig: Take 100 mg by mouth At Bedtime Filled 1/11/21 #30   vortioxetine (TRINTELLIX) 10 MG tablet 6/5/2021 at Unknown time Self Yes Yes   Sig: Take 10 mg by mouth 2 times daily      Facility-Administered Medications: None       ALLERGIES:   Allergies   Allergen Reactions     Amlodipine Swelling     Lisinopril      Other reaction(s): Angioedema  Mouth and tongue swelling   Mouth and tongue swelling          SOCIAL HISTORY:  Social History     Tobacco Use     Smoking status: Current Every Day Smoker     Packs/day: 1.00     Types: Cigarettes     Smokeless tobacco: Never Used     Tobacco comment: Chantix caused nightmares   Substance Use Topics     Alcohol use: Yes     Comment: 1 pint of vodka a day     Drug use: No       FAMILY HISTORY:  Family History   Problem Relation Age of Onset     Mental Illness Son      Coronary Artery  Disease Early Onset Mother      Substance Abuse Father      Lung Cancer Father      Substance Abuse Brother      Unknown/Adopted No family hx of      Depression No family hx of      Anxiety Disorder No family hx of      Schizophrenia No family hx of      Bipolar Disorder No family hx of      Suicide No family hx of      Dementia No family hx of      Ramona Disease No family hx of      Parkinsonism No family hx of      Autism Spectrum Disorder No family hx of      Intellectual Disability (Mental Retardation) No family hx of        PHYSICAL EXAM:   General  Alert, oriented and c/o abdominal pain  Vital Signs with Ranges  Temp: 98.6  F (37  C) Temp src: Oral BP: 137/72 Pulse: 66   Resp: 20 SpO2: 95 % O2 Device: None (Room air)    No intake/output data recorded.    Constitutional: healthy, alert and no distress   Cardiovascular: negative, PMI normal. No lifts, heaves, or thrills. RRR. No murmurs, clicks gallops or rub  Respiratory: negative, Percussion normal. Good diaphragmatic excursion. Lungs clear  Abdomen: Abdomen firm, distended and tender. BS normal. No masses, organomegaly          ADDITIONAL COMMENTS:   I reviewed the patient's new clinical lab test results.   Recent Labs   Lab Test 06/07/21  0839 06/06/21  0600 05/01/21  1231 03/26/21 2131 03/26/21 2131   WBC 3.7* 3.6* 7.6  --  4.8   HGB 7.8* 7.4* 8.0*  --  8.3*    100 94  --  97   * 111* 208   < > 162   INR  --  1.31* 1.20*  --  1.24*    < > = values in this interval not displayed.     Recent Labs   Lab Test 06/07/21  0839 06/06/21  0600 05/01/21  1231   POTASSIUM 3.9 4.0 4.0   CHLORIDE 109 114* 113*   CO2 20 19* 22   BUN 29 32* 34*   ANIONGAP 7 8 6     Recent Labs   Lab Test 06/06/21  1023 06/06/21  0600 03/26/21 2131 03/16/21  0825 03/13/21  0946 03/13/21  0946 03/11/21  0636 03/10/21  2252 10/06/20  1610 10/06/20  1610   ALBUMIN  --  2.2* 2.7* 2.7*   < > 2.9*  --  3.1*   < >  --    BILITOTAL  --  0.3 2.2* 0.9  --  1.3  --  1.8*   < >   --    ALT  --  15 26 38  --  37  --  39   < >  --    AST  --  31 36 60*  --  71*  --  89*   < >  --    PROTEIN 20*  --   --   --   --   --  70*  --   --  10*   LIPASE  --   --  444*  --   --  490*  --  703*   < >  --     < > = values in this interval not displayed.       I reviewed the patient's new imaging results.        CONSULTATION ASSESSMENT AND PLAN:    Jim Richard is a pleasant 66 year old male with alcoholic liver cirrhosis complicated with esophageal varices, abdominal ascites, hepatorenal syndrome, pancytopenia and c/o abdominal pain. GI consulted on 6/7/21.     Active Problems:  Alcoholic liver cirrhosis  Elevated Creatinine- hepatorenal syndrome  Abdominal pain  Abdominal ascites- concern for SBP  Hypoalbuminemia  Pancytopenia  Patient has suffered from chronic alcoholism and developed significant complications with decompensated liver cirrhosis. He has required paracentesis every 2 weeks. Has had elevating creatinine- currently 1.91 (05/21 1.58) He has type 2 hepatorenal syndrome. Has pancytopenia due to bone marrow suppression.  Albumin 2.2  Platelets 109  MELD score 16  There is a significant concern that patient has SBP. Has paracentesis scheduled today.  Last paracentesis done 5/28 and removed 6.4 L of fluid    --Due to worsening kidney function would recommend to discontinue all diuretics. He is unable to tolerate even low doses.   -- Continue midodrine and pantoprazole  -- Albumin started- will need to discontinue once albumin 3.5 or higher  -- Continue ceftriaxone  -- Paracentesis today  -- Consider TIPS procedure in near future  -- Monitor CBC and BMP  -- Regular diet and encourage good nutrition         [facility].  Office: [phone]  Cell : [phone] (Dr. Shaw)  Cell: [phone] (Gloria De Leon PA-C)

## 2021-06-07 NOTE — PLAN OF CARE
A&Ox4. VSS on RA. Anterior lung sounds dim, posterior course . Clear liquid diet, tolerating well. Bowel sounds active +flatus. Abdomin distended/firm. Voiding adequately. Pain controlled with oxycodone and dilaudid. Up with 1A+ gb/walker.

## 2021-06-07 NOTE — PLAN OF CARE
DATE & TIME: 6/7/21 [id number]             Cognitive Concerns/ Orientation : A&O x 4    BEHAVIOR & AGGRESSION TOOL COLOR: green  CIWA SCORE: NA      ABNL VS/O2: VSS on RA  MOBILITY: Ax1 GB/walker, up in chair for meals.  PAIN MANAGMENT: Given oxy x1 for abdominal pain  DIET: Regular- encouraged PO  BOWEL/BLADDER: Leger patent with good UOP  DRAIN/DEVICES: PIV infusing 1/2 NS @75cc/hr  TELEMETRY RHYTHM: NA  TESTS/PROCEDURES: US paracentesis today, 5.5 L off  D/C DATE: Abdomen distended, BS active, para site on left abdomen CD. Scattered bruises to both arms, abrasion to knee/back.  Continue IV rocephin/albumin. NICOLE. GI following.

## 2021-06-07 NOTE — PROGRESS NOTES
Care Suites Procedure Nursing Note    Patient Information  Name: Jim Richard  Age: 66 year old    Procedure  Procedure: paracentesis  Procedure start time: 1405  Procedure complete time: 1435  Concerns/abnormal assessment: none  If abnormal assessment, provider notified: N/A  Plan/Other: observe, will transport pt back to [facility] per cart. Pt tolerated paracentesis well. 5500 ml yellow fluid removed per Sharan L PA. Left abdomen site soft with bandaid clean and dry. Denies any c/o at this time.     Padmini Zepeda RN

## 2021-06-07 NOTE — PLAN OF CARE
DATE & TIME: 6/7/21 3424-6325    Cognitive Concerns/ Orientation : A&O x 4    BEHAVIOR & AGGRESSION TOOL COLOR: green  CIWA SCORE: NA   ABNL VS/O2: VSS on RA  MOBILITY: Up A1 with GB and walker  PAIN MANAGMENT: 10 mg Oxy for abdominal pain  DIET: Clear liq  BOWEL/BLADDER: continent of B/B   DRAIN/DEVICES: PIV SL  TELEMETRY RHYTHM:   TESTS/PROCEDURES:   D/C DATE: Discharge pending

## 2021-06-07 NOTE — UTILIZATION REVIEW
Admission Status; Secondary Review Determination    Under the authority of the Utilization Management Committee, the utilization review process indicated a secondary review on the above patient. The review outcome is based on review of the medical records, discussions with staff, and applying clinical experience noted on the date of the review.    (x) Inpatient Status Appropriate - This patient's medical care is consistent with medical management for inpatient care and reasonable inpatient medical practice.    RATIONALE FOR DETERMINATION: 66-year-old Male with history of alcoholism with decompensated alcohol liver cirrhosis with esophageal varices, abdominal ascites, history of GI bleed and hepatic encephalopathy as well as hepatorenal syndrome.  Patient now admitted due to worsening abdominal pain and distended abdomen with concern for spontaneous bacterial peritonitis with acute worsening renal function concerning for hepatorenal syndrome.  Due to the complexity of patient's medical problems with worsening renal function in the face of worsening ascites and liver disease, patient will require inpatient management.    At the time of admission with the information available to the attending physician more than 2 nights Hospital complex care was anticipated, based on patient risk of adverse outcome if treated as outpatient and complex care required. Inpatient admission is appropriate based on the Medicare guidelines.    This document was produced using voice recognition software    The information on this document is developed by the utilization review team in order for the business office to ensure compliance. This only denotes the appropriateness of proper admission status and does not reflect the quality of care rendered.    The definitions of Inpatient Status and Observation Status used in making the determination above are those provided in the CMS Coverage Manual, Chapter 1 and Chapter 6, section  70.4.    Sincerely,    You Urias MD  Utilization Review  Physician Advisor  [employer].

## 2021-06-08 LAB
ALBUMIN SERPL-MCNC: 2.3 G/DL (ref 3.4–5)
ALP SERPL-CCNC: 132 U/L (ref 40–150)
ALT SERPL W P-5'-P-CCNC: 13 U/L (ref 0–70)
ANION GAP SERPL CALCULATED.3IONS-SCNC: 5 MMOL/L (ref 3–14)
AST SERPL W P-5'-P-CCNC: 18 U/L (ref 0–45)
BILIRUB SERPL-MCNC: 0.5 MG/DL (ref 0.2–1.3)
BUN SERPL-MCNC: 28 MG/DL (ref 7–30)
CALCIUM SERPL-MCNC: 7.6 MG/DL (ref 8.5–10.1)
CHLORIDE SERPL-SCNC: 107 MMOL/L (ref 94–109)
CO2 SERPL-SCNC: 21 MMOL/L (ref 20–32)
CREAT SERPL-MCNC: 1.97 MG/DL (ref 0.66–1.25)
GFR SERPL CREATININE-BSD FRML MDRD: 34 ML/MIN/{1.73_M2}
GLUCOSE SERPL-MCNC: 98 MG/DL (ref 70–99)
HGB BLD-MCNC: 7.2 G/DL (ref 13.3–17.7)
MAGNESIUM SERPL-MCNC: 1.7 MG/DL (ref 1.6–2.3)
PHOSPHATE SERPL-MCNC: 3.2 MG/DL (ref 2.5–4.5)
POTASSIUM SERPL-SCNC: 4.1 MMOL/L (ref 3.4–5.3)
PROT SERPL-MCNC: 5.4 G/DL (ref 6.8–8.8)
SODIUM SERPL-SCNC: 133 MMOL/L (ref 133–144)

## 2021-06-08 PROCEDURE — 120N000001 HC R&B MED SURG/OB

## 2021-06-08 PROCEDURE — 258N000002 HC RX IP 258 OP 250: Performed by: INTERNAL MEDICINE

## 2021-06-08 PROCEDURE — 99233 SBSQ HOSP IP/OBS HIGH 50: CPT | Performed by: INTERNAL MEDICINE

## 2021-06-08 PROCEDURE — 84100 ASSAY OF PHOSPHORUS: CPT | Performed by: INTERNAL MEDICINE

## 2021-06-08 PROCEDURE — 250N000013 HC RX MED GY IP 250 OP 250 PS 637: Performed by: INTERNAL MEDICINE

## 2021-06-08 PROCEDURE — P9047 ALBUMIN (HUMAN), 25%, 50ML: HCPCS | Performed by: INTERNAL MEDICINE

## 2021-06-08 PROCEDURE — 36415 COLL VENOUS BLD VENIPUNCTURE: CPT | Performed by: INTERNAL MEDICINE

## 2021-06-08 PROCEDURE — 83735 ASSAY OF MAGNESIUM: CPT | Performed by: INTERNAL MEDICINE

## 2021-06-08 PROCEDURE — 85018 HEMOGLOBIN: CPT | Performed by: INTERNAL MEDICINE

## 2021-06-08 PROCEDURE — 250N000011 HC RX IP 250 OP 636: Performed by: INTERNAL MEDICINE

## 2021-06-08 PROCEDURE — 80053 COMPREHEN METABOLIC PANEL: CPT | Performed by: INTERNAL MEDICINE

## 2021-06-08 PROCEDURE — 99222 1ST HOSP IP/OBS MODERATE 55: CPT | Performed by: SURGERY

## 2021-06-08 RX ADMIN — GABAPENTIN 300 MG: 300 CAPSULE ORAL at 15:24

## 2021-06-08 RX ADMIN — CARBIDOPA AND LEVODOPA 2.5 MG: 50; 200 TABLET, EXTENDED RELEASE ORAL at 09:18

## 2021-06-08 RX ADMIN — GABAPENTIN 300 MG: 300 CAPSULE ORAL at 09:18

## 2021-06-08 RX ADMIN — NICOTINE 1 PATCH: 21 PATCH, EXTENDED RELEASE TRANSDERMAL at 21:12

## 2021-06-08 RX ADMIN — ALBUMIN HUMAN 12.5 G: 0.25 SOLUTION INTRAVENOUS at 02:05

## 2021-06-08 RX ADMIN — CARBIDOPA AND LEVODOPA 2.5 MG: 50; 200 TABLET, EXTENDED RELEASE ORAL at 17:28

## 2021-06-08 RX ADMIN — VORTIOXETINE 10 MG: 10 TABLET, FILM COATED ORAL at 21:08

## 2021-06-08 RX ADMIN — TRAZODONE HYDROCHLORIDE 100 MG: 100 TABLET ORAL at 21:08

## 2021-06-08 RX ADMIN — SODIUM CHLORIDE: 4.5 INJECTION, SOLUTION INTRAVENOUS at 04:42

## 2021-06-08 RX ADMIN — QUETIAPINE FUMARATE 50 MG: 25 TABLET ORAL at 09:22

## 2021-06-08 RX ADMIN — THIAMINE HCL TAB 100 MG 100 MG: 100 TAB at 09:18

## 2021-06-08 RX ADMIN — PANTOPRAZOLE SODIUM 40 MG: 40 TABLET, DELAYED RELEASE ORAL at 09:17

## 2021-06-08 RX ADMIN — FOLIC ACID 1 MG: 1 TABLET ORAL at 09:18

## 2021-06-08 RX ADMIN — QUETIAPINE FUMARATE 200 MG: 100 TABLET ORAL at 21:08

## 2021-06-08 RX ADMIN — VORTIOXETINE 10 MG: 10 TABLET, FILM COATED ORAL at 09:18

## 2021-06-08 RX ADMIN — OXYCODONE HYDROCHLORIDE 10 MG: 5 TABLET ORAL at 11:17

## 2021-06-08 RX ADMIN — PANTOPRAZOLE SODIUM 40 MG: 40 TABLET, DELAYED RELEASE ORAL at 21:08

## 2021-06-08 RX ADMIN — CEFTRIAXONE SODIUM 1 G: 1 INJECTION, POWDER, FOR SOLUTION INTRAMUSCULAR; INTRAVENOUS at 12:04

## 2021-06-08 RX ADMIN — MIRTAZAPINE 30 MG: 15 TABLET, FILM COATED ORAL at 21:08

## 2021-06-08 RX ADMIN — OXYCODONE HYDROCHLORIDE 10 MG: 5 TABLET ORAL at 02:11

## 2021-06-08 RX ADMIN — MULTIPLE VITAMINS W/ MINERALS TAB 1 TABLET: TAB at 09:18

## 2021-06-08 RX ADMIN — QUETIAPINE FUMARATE 50 MG: 25 TABLET ORAL at 15:23

## 2021-06-08 RX ADMIN — OXYCODONE HYDROCHLORIDE 10 MG: 5 TABLET ORAL at 17:28

## 2021-06-08 RX ADMIN — CARBIDOPA AND LEVODOPA 2.5 MG: 50; 200 TABLET, EXTENDED RELEASE ORAL at 12:04

## 2021-06-08 RX ADMIN — GABAPENTIN 300 MG: 300 CAPSULE ORAL at 21:08

## 2021-06-08 RX ADMIN — TAMSULOSIN HYDROCHLORIDE 0.8 MG: 0.4 CAPSULE ORAL at 09:18

## 2021-06-08 RX ADMIN — ALBUMIN HUMAN 12.5 G: 0.25 SOLUTION INTRAVENOUS at 09:23

## 2021-06-08 ASSESSMENT — ACTIVITIES OF DAILY LIVING (ADL)
ADLS_ACUITY_SCORE: 13

## 2021-06-08 ASSESSMENT — MIFFLIN-ST. JEOR: SCORE: 1485.24

## 2021-06-08 NOTE — PLAN OF CARE
DATE & TIME: 6/8/21 6293-7520            Cognitive Concerns/ Orientation : A&O x 4    BEHAVIOR & AGGRESSION TOOL COLOR: green  CIWA SCORE: NA      ABNL VS/O2: VSS on RA  MOBILITY: Ax1 GB/walker, up in chair for meals.  PAIN MANAGMENT: Given oxy x2 for abdominal pain  DIET: Regular- encouraged PO  BOWEL/BLADDER: Leger patent with good UOP  ABNL LAB/BG: Na 133, Cr. 1.97, wbc 3.7, hgb 7.2  DRAIN/DEVICES: PIV Saline Locked, Q8 Albumin, Q24hr Antibiotics   TELEMETRY RHYTHM: NA  TESTS/PROCEDURES: US paracentesis yesterday 6/7/21, 5.5 L off    D/C DATE: Abdomen distended, BS active, para site on left abdomen CDI. Scattered bruises to both arms, abrasion to knee/back.  Continue IV rocephin/albumin. NICOLE. GI following.

## 2021-06-08 NOTE — PLAN OF CARE
DATE & TIME: 6/7/21 2300-0730            Cognitive Concerns/ Orientation : A&O x 4    BEHAVIOR & AGGRESSION TOOL COLOR: green  CIWA SCORE: NA      ABNL VS/O2: VSS on RA  MOBILITY: Ax1 GB/walker, up in chair for meals.  PAIN MANAGMENT: Given oxy x1 for abdominal pain  DIET: Regular- encouraged PO  BOWEL/BLADDER: Leger patent with good UOP  ABNL LAB/BG: Na 131, Cr. 2.03, wbc 3.7, hgb 7.8  DRAIN/DEVICES: PIV infusing 1/2 NS @75cc/hr  TELEMETRY RHYTHM: NA  TESTS/PROCEDURES: US paracentesis yesterday, 5.5 L off  D/C DATE: Abdomen distended, BS active, para site on left abdomen CD. Scattered bruises to both arms, abrasion to knee/back.  Continue IV rocephin/albumin. NICOLE. GI following.

## 2021-06-08 NOTE — PROGRESS NOTES
[facility]  Gastroenterology Progress Note     Jim Richard MRN# [id number]   YOB: 1955 Age: 66 year old          Assessment and Plan:    Jim Richard is a pleasant 66 year old male with alcoholic liver cirrhosis complicated with esophageal varices, abdominal ascites, hepatorenal syndrome, pancytopenia and c/o abdominal pain. GI consulted on 6/7/21.     Active Problems:  Alcoholic liver cirrhosis  Elevated Creatinine- hepatorenal syndrome  Abdominal pain  Abdominal ascites- concern for SBP  Hypoalbuminemia  Pancytopenia  Patient has suffered from chronic alcoholism and developed significant complications with decompensated liver cirrhosis. He has required paracentesis every 2 weeks. Has had elevating creatinine- currently 1.97 (05/21 1.58) He has type 2 hepatorenal syndrome. Has pancytopenia due to bone marrow suppression.  Albumin 2.2  Platelets 109  Hemoglobin 72 and stable with negative occult blood  MELD score 16  Low suspicion for SBP given cell counts. No organisms seen.     -- Worsening kidney function would not diuretics. Has hepatorenal syndrome  -- Continue midodrine and pantoprazole  -- Albumin started- will need to discontinue once albumin 3.5 or higher  -- Continue ceftriaxone  -- Consider TIPS procedure in near future  -- Monitor CBC and BMP  -- Regular diet and encourage good nutrition          Acute renal failure, unspecified acute renal failure type (H)  End-stage liver disease (H)      Interval History:   no new complaints, doing well, denies chest pain, denies shortness of breath, denies abdominal pain, alert, oriented to person, place and time and doing well; no cp, sob, n/v/d, or abd pain.              Review of Systems:   C: NEGATIVE for fever, chills, change in weight  E/M: NEGATIVE for ear, mouth and throat problems  R: NEGATIVE for significant cough or SOB  CV: NEGATIVE for chest pain, palpitations or peripheral edema             Medications:    I have reviewed this patient's current medications    albumin human  12.5 g Intravenous Q8H     cefTRIAXone  1 g Intravenous Q24H     folic acid  1 mg Oral Daily     gabapentin  300 mg Oral TID     midodrine  2.5 mg Oral TID     mirtazapine  30 mg Oral At Bedtime     multivitamin w/minerals  1 tablet Oral Daily     nicotine   Transdermal Q8H     pantoprazole  40 mg Oral BID     QUEtiapine  200 mg Oral At Bedtime     sodium chloride (PF)  3 mL Intracatheter Q8H     tamsulosin  0.8 mg Oral Daily     vitamin B1  100 mg Oral Daily     traZODone  100 mg Oral At Bedtime     vortioxetine  10 mg Oral BID                  Physical Exam:   Vitals were reviewed  Vital Signs with Ranges  Temp:  [98.2  F (36.8  C)-99.9  F (37.7  C)] 99.2  F (37.3  C)  Pulse:  [64-80] 80  Resp:  [16-20] 16  BP: (109-144)/(53-88) 120/60  SpO2:  [95 %-99 %] 95 %  I/O last 3 completed shifts:  In: 2788 [P.O.:1520; I.V.:1268]  Out: 6725 [Urine:1225; Drains:5500]  Constitutional: healthy, alert and no distress   Cardiovascular: negative, PMI normal. No lifts, heaves, or thrills. RRR. No murmurs, clicks gallops or rub  Respiratory: negative, Percussion normal. Good diaphragmatic excursion. Lungs clear  Abdomen: Abdomen soft, non-tender. BS normal. No masses, organomegaly, positive findings: distended, tenderness mild RLQ, RLQ hernia present           Data:   I reviewed the patient's new clinical lab test results.   Recent Labs   Lab Test 06/08/21  0824 06/07/21  0839 06/06/21  0600 05/01/21  1231 03/26/21  2131 03/26/21 2131   WBC  --  3.7* 3.6* 7.6  --  4.8   HGB 7.2* 7.8* 7.4* 8.0*  --  8.3*   MCV  --  100 100 94  --  97   PLT  --  109* 111* 208   < > 162   INR  --   --  1.31* 1.20*  --  1.24*    < > = values in this interval not displayed.     Recent Labs   Lab Test 06/08/21  0824 06/07/21  1848 06/07/21  0839   POTASSIUM 4.1 4.2 3.9   CHLORIDE 107 105 109   CO2 21 21 20   BUN 28 29 29   ANIONGAP 5 5 7     Recent Labs   Lab Test 06/08/21  0824  06/06/21  1023 06/06/21  0600 03/26/21  2131 03/13/21  0946 03/13/21  0946 03/11/21  0636 03/10/21  2252 10/06/20  1610 10/06/20  1610   ALBUMIN 2.3*  --  2.2* 2.7*   < > 2.9*  --  3.1*   < >  --    BILITOTAL 0.5  --  0.3 2.2*   < > 1.3  --  1.8*   < >  --    ALT 13  --  15 26   < > 37  --  39   < >  --    AST 18  --  31 36   < > 71*  --  89*   < >  --    PROTEIN  --  20*  --   --   --   --  70*  --   --  10*   LIPASE  --   --   --  444*  --  490*  --  703*   < >  --     < > = values in this interval not displayed.       I reviewed the patient's new imaging results.    All laboratory data reviewed  All imaging studies reviewed by me.    Gloria De Leon PA-C,  6/8/2021  [facility]  Office : [phone]  Cell: [phone] (Dr. Shaw)  Cell: [phone] (Gloria De Leon PA-C)

## 2021-06-08 NOTE — CONSULTS
[facility]  General Surgery Consultation         Arnulfo Correia MD    Jim Richard MRN# [id number]   YOB: 1955 Age: 66 year old      Date of Admission:  6/6/2021  Date of Consult: 6/8/2021         Assessment and Plan:   Patient is a 66 year old male with end-stage liver disease with umbilical hernia    PLAN:  I described the pathophysiology of umbilical hernias and management and patients with end-stage liver disease.  He would be at an extremely high risk for any surgical procedure given his overall condition. I described that the indications for surgical repair would be skin necrosis with leakage of ascites or incarceration of bowel. At this point, he does not have signs of either entity.  He is tolerating a regular diet and having regular bowel movements.  The area is tender but does not appear acutely incarcerated.  I I would recommend continued medical management.  If any of the above entities become apparent we will reevaluate his situation.        Chief Complaint:     Chief Complaint   Patient presents with     Abdominal Pain     Pint of vodka last night. Fell out of bed. Too weak to get back up. No CP/LOC. Abdomen distended. Gets tapped approx every 2 weeks. About due for another tap.          History of Present Illness:   Patient is a 66 year old male who I was asked to see by Dr. Rodriguez  for evaluation of umbilical hernia.  The patient was admitted on 6/6 with abdominal pain due end-stage liver disease.  He has alcoholic cirrhosis with ascites that he gets a paracentesis on a weekly basis.  He has had a known umbilical hernia for a long time.  He has had a repair in the past.  He states is always protuberant but has not caused much discomfort.  He states that prior to a tap yesterday he was experiencing more pressure and tenderness around the area.  He has been tolerating a regular diet and had a bowel movement yesterday.  He is actively passing gas.  He has never  "had any nausea or vomiting.  He states he has never been able to reduce the hernia completely.  He has noted no external skin changes.  His only new complaint is tenderness around the area.  His last paracentesis was yesterday. Patient denies fevers, chills, nausea, vomiting, jaundice, changes in stool or urine, headache, SOB, chest pain.          Physical Exam:   Blood pressure 120/60, pulse 80, temperature 99.2  F (37.3  C), temperature source Oral, resp. rate 16, height 1.702 m (5' 7\"), weight 75.2 kg (165 lb 11.2 oz), SpO2 95 %.  165 lbs 11.2 oz  General: Generally appears well.  Psych: Alert and Oriented.  Normal affect  Neurological: grossly intact  Eyes: Sclera clear  Respiratory:  Lungs with good air excursion  Cardiovascular:  Normal peripheral pulses  GI: Abdomen Soft.  Umbilical hernia palpated.  Soft and partially reducible but likely chronically incarcerated.  Skin completely intact with no breakdown or erythema.  No tenderness throughout.  Lymphatic/Hematologic/Immune:  No obvious lymphadenopathy.  Integumentary:  No rashes       Past Medical History:     Past Medical History:   Diagnosis Date     Alcoholism (H) 1/14/2013    had treatment at [facility]     Anxiety      COPD (chronic obstructive pulmonary disease) (H)      Coronary artery disease 09/09/2014    Nuclear stress test with slight fixed defect inferiorly, no ischemia     Depression     w/anxiety     Esophageal varices with bleeding 04/28/2018    6 varices banded on EGD     Heart attack (H)      Hepatomegaly     Fatty liver, chronic alcoholic     Hyperlipemia      Hypertension      JANIS on CPAP      Peripheral vascular disease (H)      PVD (peripheral vascular disease) (H)      SDH (subdural hematoma) (H) 04/26/2018    Right side, resolved spontaneously     Spinal stenosis      Substance abuse (H)           Past Surgical History:     Past Surgical History:   Procedure Laterality Date     APPENDECTOMY       BYPASS GRAFT FEMOROPOPLITEAL  " 6/12/2012    Procedure: BYPASS GRAFT FEMOROPOPLITEAL;  LEFT FEMORAL TO ABOVE KNEE POPLITEAL BYPASS, ENDARTERECTOMY OF SFA AND PF ARTERIES, LEFT EXTERNAL ILIAC TO COMMON FEMORAL INTERPOSITION GRAFT;  Surgeon: Damir Roberts MD;  Location:  OR     COLONOSCOPY       COLONOSCOPY N/A 8/8/2019    Procedure: COLONOSCOPY;  Surgeon: Pavan Arguello MD;  Location:  GI     COLONOSCOPY N/A 3/10/2020    Procedure: COLONOSCOPY;  Surgeon: Rosendo Shaw MD;  Location:  GI     ENDARTERECTOMY FEMORAL  6/12/2012    Procedure: ENDARTERECTOMY FEMORAL;;  Surgeon: Damir Roberts MD;  Location:  OR     ESOPHAGOSCOPY, GASTROSCOPY, DUODENOSCOPY (EGD), COMBINED N/A 3/22/2018    Procedure: COMBINED ESOPHAGOSCOPY, GASTROSCOPY, DUODENOSCOPY (EGD);  ESOPHAGOSCOPY, GASTROSCOPY, DUODENOSOCPY.;  Surgeon: Valeri Pruitt MD;  Location:  OR     ESOPHAGOSCOPY, GASTROSCOPY, DUODENOSCOPY (EGD), COMBINED N/A 9/29/2018    Procedure: COMBINED ESOPHAGOSCOPY, GASTROSCOPY, DUODENOSCOPY (EGD);;  Surgeon: Rosendo Shaw MD;  Location: [facility]     ESOPHAGOSCOPY, GASTROSCOPY, DUODENOSCOPY (EGD), COMBINED N/A 8/2/2019    Procedure: ESOPHAGOGASTRODUODENOSCOPY (EGD);  Surgeon: Pavan Arguello MD;  Location:  GI     ESOPHAGOSCOPY, GASTROSCOPY, DUODENOSCOPY (EGD), COMBINED N/A 9/25/2019    Procedure: ESOPHAGOGASTRODUODENOSCOPY (EGD);  Surgeon: Pavan Arguello MD;  Location:  GI     ESOPHAGOSCOPY, GASTROSCOPY, DUODENOSCOPY (EGD), COMBINED N/A 3/8/2020    Procedure: ESOPHAGOGASTRODUODENOSCOPY (EGD);  Surgeon: Rosendo Shaw MD;  Location:  GI     ESOPHAGOSCOPY, GASTROSCOPY, DUODENOSCOPY (EGD), COMBINED N/A 6/11/2020    Procedure: ESOPHAGOGASTRODUODENOSCOPY (EGD);  Surgeon: Rosendo Shaw MD;  Location:  GI     ESOPHAGOSCOPY, GASTROSCOPY, DUODENOSCOPY (EGD), COMBINED N/A 1/23/2021    Procedure: ESOPHAGOGASTRODUODENOSCOPY (EGD);  Surgeon: Pavan Arguello MD;  Location:  GI     HERNIA REPAIR  2017    Abdominal      LAMINECTOMY, FUSION LUMBAR THREE+ LEVEL, COMBINED N/A 9/22/2014    Procedure: COMBINED LAMINECTOMY, FUSION LUMBAR THREE+ LEVEL;  Surgeon: Sebastien Pruitt MD;  Location:  OR     TONSILLECTOMY & ADENOIDECTOMY            Current Medications:           cefTRIAXone  1 g Intravenous Q24H     folic acid  1 mg Oral Daily     gabapentin  300 mg Oral TID     midodrine  2.5 mg Oral TID     mirtazapine  30 mg Oral At Bedtime     multivitamin w/minerals  1 tablet Oral Daily     nicotine   Transdermal Q8H     pantoprazole  40 mg Oral BID     QUEtiapine  200 mg Oral At Bedtime     sodium chloride (PF)  3 mL Intracatheter Q8H     tamsulosin  0.8 mg Oral Daily     vitamin B1  100 mg Oral Daily     traZODone  100 mg Oral At Bedtime     vortioxetine  10 mg Oral BID     acetaminophen, albuterol, glucose **OR** dextrose **OR** glucagon, eszopiclone, flumazenil, fluticasone-vilanterol, OLANZapine zydis **OR** haloperidol lactate, HYDROmorphone, hydrOXYzine, lidocaine 4%, lidocaine 4%, lidocaine (buffered or not buffered), lidocaine (buffered or not buffered), LORazepam **OR** LORazepam, - MEDICATION INSTRUCTIONS -, melatonin, naloxone **OR** naloxone **OR** naloxone **OR** naloxone, nicotine, ondansetron **OR** ondansetron, oxyCODONE, polyethylene glycol, QUEtiapine, sodium chloride (PF), sodium chloride (PF), sodium chloride (PF)       Home Medications:     Prior to Admission medications    Medication Sig Last Dose Taking? Auth Provider   diazepam (VALIUM) 10 MG tablet Take 10 mg by mouth every 12 hours as needed for anxiety Past Week at Unknown time Yes Unknown, Entered By History   eszopiclone (LUNESTA) 3 MG tablet Take 3 mg by mouth nightly as needed for sleep new Yes Unknown, Entered By History   fluticasone-vilanterol (BREO ELLIPTA) 200-25 MCG/INH inhaler Inhale 1 puff into the lungs daily as needed (SOB) Past Month at Unknown time Yes Jeffrey Villagomez MD   folic acid (FOLVITE) 1 MG tablet Take 1 mg by mouth daily  6/5/2021 at Unknown time Yes Unknown, Entered By History   gabapentin (NEURONTIN) 300 MG capsule Take 300 mg by mouth 3 times daily 6/5/2021 at Unknown time Yes Unknown, Entered By History   hydrOXYzine (ATARAX) 50 MG tablet Take 1 tablet (50 mg) by mouth nightly as needed for other (sleep)  Patient taking differently: Take 50 mg by mouth nightly as needed for other (sleep) Filled 1/11/21 #90 Past Week at Unknown time Yes Jeffrey Villagomez MD   magnesium oxide (MAG-OX) 400 (240 Mg) MG tablet Take 400 mg by mouth daily 6/5/2021 at Unknown time Yes Unknown, Entered By History   midodrine (PROAMATINE) 2.5 MG tablet Take 2.5 mg by mouth 3 times daily 6/5/2021 at Unknown time Yes Unknown, Entered By History   mirtazapine (REMERON) 30 MG tablet Take 30 mg by mouth At Bedtime Filled 1/11/21 for #30 6/5/2021 at Unknown time Yes Unknown, Entered By History   multivitamin w/minerals (THERA-VIT-M) tablet Take 1 tablet by mouth daily  Patient taking differently: Take 1 tablet by mouth daily Filled 9/21/21 #30 6/5/2021 at Unknown time Yes Jeffrey Villagomez MD   nicotine (NICODERM CQ) 21 MG/24HR 24 hr patch Place 1 patch onto the skin every 24 hours  Yes Unknown, Entered By History   pantoprazole (PROTONIX) 40 MG EC tablet Take 1 tablet (40 mg) by mouth 2 times daily 6/5/2021 at Unknown time Yes Sara Orellana MD   QUEtiapine (SEROQUEL) 200 MG tablet Take 200 mg by mouth At Bedtime Filled 1/11/21 #30  6/5/2021 at Unknown time Yes Unknown, Entered By History   QUEtiapine (SEROQUEL) 50 MG tablet Take 1 tablet (50 mg) by mouth 3 times daily as needed (anxiety) Filled 11/20/20 #30 6/5/2021 at Unknown time Yes Sara Orellana MD   tamsulosin (FLOMAX) 0.4 MG capsule Take 2 capsules (0.8 mg) by mouth daily Past Week at Unknown time Yes Sara Orellana MD   traZODone (DESYREL) 100 MG tablet Take 100 mg by mouth At Bedtime Filled 1/11/21 #30 6/5/2021 at Unknown time Yes Unknown, Entered By History    vortioxetine (TRINTELLIX) 10 MG tablet Take 10 mg by mouth 2 times daily 6/5/2021 at Unknown time Yes Unknown, Entered By History   albuterol (PROAIR HFA/PROVENTIL HFA/VENTOLIN HFA) 108 (90 Base) MCG/ACT inhaler Inhale 1-2 puffs into the lungs every 4 hours as needed for shortness of breath / dyspnea or wheezing More than a month at Unknown time  Unknown, Entered By History          Allergies:     Allergies   Allergen Reactions     Amlodipine Swelling     Lisinopril      Other reaction(s): Angioedema  Mouth and tongue swelling   Mouth and tongue swelling             Family History:     Family History   Problem Relation Age of Onset     Mental Illness Son      Coronary Artery Disease Early Onset Mother      Substance Abuse Father      Lung Cancer Father      Substance Abuse Brother      Unknown/Adopted No family hx of      Depression No family hx of      Anxiety Disorder No family hx of      Schizophrenia No family hx of      Bipolar Disorder No family hx of      Suicide No family hx of      Dementia No family hx of      Britt Disease No family hx of      Parkinsonism No family hx of      Autism Spectrum Disorder No family hx of      Intellectual Disability (Mental Retardation) No family hx of          Social History:   Jim KVNG Richard  reports that he has been smoking cigarettes. He has been smoking about 1.00 pack per day. He has never used smokeless tobacco. He reports current alcohol use. He reports that he does not use drugs.        Review of Systems:   The 12 point Review of Systems is negative other than noted in the HPI.       Labs/Imaging   All new lab and imaging data was reviewed.   Arnulfo Correia M.D.  [facility]

## 2021-06-08 NOTE — PROGRESS NOTES
[facility]    HOSPITALIST PROGRESS NOTE :   --------------------------------------------------    Date of Admission:  6/6/2021    Cumulative Summary: Jim Richard is a 66 year old male with past medical history significant for persistent alcohol abuse, alcoholic cirrhosis, recurrent ascites requiring weekly to biweekly paracentesis, COPD, CAD, esophageal varices, essential hypertension, peripheral vascular disease, pancytopenia, chronic kidney disease or chronic thrombocytopenia and anemia presented to the emergency room on June 5th due to the concern for new and acute abdominal pain with distention associated with ascites.  Patient admitted drinking a pint of vodka the night before.    Assessment & Plan     Acute generalized abdominal pain: Concern for possible spontaneous bacterial peritonitis.  Acute renal failure on top of chronic kidney disease; low urine output, patient does not seem to be on any diuretics at home, denying following up with gastroenterology as an outpatient, at this time there is also concern for development of hepatorenal syndrome.  Recurrent large-volume ascites due to alcoholic liver disease: According to patient, he is requiring paracentesis every 2 weeks and usually requires anywhere from 5 to 7 L of fluid removal.  According to patient, he underwent fluid removal in the emergency department where couple of liters of fluid was removed.  This morning patient remains significantly uncomfortable with distended abdomen, denying any similar pain in the past with the presence of ascites only.  Initial fluid analysis showing only 10 WBC count does not seem to be concerning for SBP.  Alcoholic liver cirrhosis; unfortunately patient continues to consume alcohol and admits consuming 1 pint of vodka the night before coming to the hospital.  Discussed with him regarding my concern for significantly associated morbidity and mortality with ongoing alcohol  consumption  End-stage liver disease  Pancytopenia associated with liver cirrhosis  Acute on chronic anemia: Patient is denying any signs and symptoms of bleeding, hemoglobin was down to 7.6.    -- Continue to monitor patient on Medical floor, looks clinically better as compared to yesterday but feeling tired and some lightheadedness on standing   -- D/C IV fluids to avoid fluid accumulation , Creatinine is relatively stable but no significant improvement   -- status post paracentesis , 5500 ml yellow fluid was removed on 06/07, in addition to couple of liters removed in ED  -- Continue IV antibiotics  -- Continue jiang, will try voiding trial tomorrow   --  Forlight headed , Hgb is down to 7.2 ,discussed regarding the blood transfusion, consent is signed , will place conditional orders   --Due to the concern for possible SBP and hepatorenal syndrome, Gave patient albumin 25 g every 8 hours for 4 doses, will check albumin level tomorrow morning , if still below 2.5 then will give more albumin  -- Gastroenterology has been following , appreciate their help  -- Continue Ceftriaxone although low probability of SBP considering initial fluid analysis.  -- Patient does not seem to be on any home diuretics, and probably will not be able to get started on any diuretics at this time due to renal functions.  -- Follow-up on fluid cultures from paracentesis from yesterday.  -- We will check fecal occult blood test due to drop in hemoglobin, came back negative   -- Continue Nexium   -- Check CMP tomorrow   -- Continue patient on Midodrine 2.5 mg p.o. 3 times daily  -- If continues to have significant discomfort, might need to do some imaging    Right Umbilical hernia :  -- Have painful right lower abdominal hernia , not able to be reduced , patient wants to know if any thing can be done about it , discussed with him that at this point I don't think surgery will proceed with any procedure right away due to co morbidities and  concern for type 2 hepatorenal syndrome but will ask them to assess  -- will also order PT and OT , in agreement to go to the rehab     Acute urinary retention  History of benign prostate hyperplasia  --Patient was retaining more than 800 mL ,also have acute renal failure, was unable to pass any urine, Leger catheter is placed , continue today  --Strict intake and output.  --Continue patient on Flomax 0.8 mg p.o. daily.    Acute renal failure on top of CKD stage III-IV: His creatinine at baseline seems to be close to 1.6, now creatinine is 1.98, low urine output.  Concern for prerenal azotemia, also large volume paracentesis causing obstruction versus concern for going in the direction of hepatorenal syndrome.    -- Treatment as above for hepatorenal syndrome   -- Continue Leger catheter.  -- Recheck creatinine tomorrow   -- Avoid nephrotoxic agents.    History of toxic encephalopathy from alcohol intoxication: Currently no signs and symptoms of encephalopathy this morning, seems to be able to answer all the questions.  Patient remains at very high risk for withdrawal though no signs seen thus far.    -- Continue CIWA with Ativan.  -- multivitamin.  -- Continue patient on thiamine and folic acid.  -- Patient will not be started at this time on any clonidine due to hypotension  --We will continue patient on PTA dose of gabapentin 300 mg p.o. 3 times a day but will not give patient high dose of tapering Neurontin at this point.    Essential hypertension  -- Patient does not seem to be taking any antihypertensive medication, continue to monitor.    COPD  -- Continue PTA Breo Ellipta 200/25 1 puff daily as needed.    Obstructive sleep apnea  --Continue CPAP    Chronic depression and anxiety  --Continue PTA Vortioxetine ,mirtazapine and trazodone and Seroquel    Diet: Regular Diet Adult    Leger Catheter: in place, indication: Retention    DVT Prophylaxis: Pneumatic Compression Devices  Code Status: No CPR- Do NOT  Intubate    The patient's care was discussed with the Bedside Nurse, Care Coordinator/ and Patient.    Disposition Plan   Expected discharge: 2 - 3 days,will order PT and OT , might need to be seen by chemical dependency ,patient already has care coordinator as an out patient , has failed commitments ,needs long term care which he is not agreeable at this point   More than 35 min of the time was spend in care today, more than 50% of the time was spent in patient care coordination and counseling.    Tiff Rodriguez MD, FACP  Text Page (7am - 6pm)    ----------------------------------------------------------------------------------------------------------------------    Interval History    Patient was seen and examined, sitting in chair , looks slightly better clinically as compared to yesterday, underwent paracentesis , still has abdominal distention but noit as firm , concerned about umblical hernia , told him that he is at very high risk for any surgical intervention, feeling lightheaded today and Hgb is down to 7.2 denying any signs and symptoms of bleeding.    -Data reviewed today: I reviewed all new labs and imaging results over the last 24 hours.    I personally reviewed no images or EKG's today.    Physical Exam   Temp: 99.9  F (37.7  C) Temp src: Oral BP: 132/88 Pulse: 74   Resp: 16 SpO2: 95 % O2 Device: None (Room air)    Vitals:    06/06/21 1301 06/07/21 1801   Weight: 86.2 kg (190 lb) 74.7 kg (164 lb 11.2 oz)     Vital Signs with Ranges  Temp:  [98.2  F (36.8  C)-99.9  F (37.7  C)] 99.9  F (37.7  C)  Pulse:  [64-74] 74  Resp:  [16-20] 16  BP: (109-144)/(53-88) 132/88  SpO2:  [95 %-99 %] 95 %  I/O last 3 completed shifts:  In: 1520 [P.O.:1520]  Out: 6175 [Urine:675; Drains:5500]    GENERAL: Alert , awake and oriented. NAD. Conversational, appropriate.  Looks chronically sick  HEENT: Normocephalic. EOMI. No icterus or injection. Nares normal.   LUNGS: Clear to auscultation with decreased air entry  in the bases, no wheezing, no crackles  HEART: Regular rate. Extremities perfused.   ABDOMEN: Firm and distended , although better than as compared to yesterday , generalized tenderness no guarding or rigidity, no rebound tenderness at this point.  EXTREMITIES: No LE edema noted.   NEUROLOGIC: Moves extremities x4 on command. No acute focal neurologic abnormalities noted.     Medications     - MEDICATION INSTRUCTIONS -         albumin human  12.5 g Intravenous Q8H     cefTRIAXone  1 g Intravenous Q24H     folic acid  1 mg Oral Daily     gabapentin  300 mg Oral TID     midodrine  2.5 mg Oral TID     mirtazapine  30 mg Oral At Bedtime     multivitamin w/minerals  1 tablet Oral Daily     nicotine   Transdermal Q8H     pantoprazole  40 mg Oral BID     QUEtiapine  200 mg Oral At Bedtime     sodium chloride (PF)  3 mL Intracatheter Q8H     tamsulosin  0.8 mg Oral Daily     vitamin B1  100 mg Oral Daily     traZODone  100 mg Oral At Bedtime     vortioxetine  10 mg Oral BID       Data   Recent Labs   Lab 06/07/21  1848 06/07/21  0839 06/06/21  0600   WBC  --  3.7* 3.6*   HGB  --  7.8* 7.4*   MCV  --  100 100   PLT  --  109* 111*   INR  --   --  1.31*   * 136 141   POTASSIUM 4.2 3.9 4.0   CHLORIDE 105 109 114*   CO2 21 20 19*   BUN 29 29 32*   CR 2.03* 1.91* 1.98*   ANIONGAP 5 7 8   KEV 7.8* 8.1* 7.6*   * 77 81   ALBUMIN  --   --  2.2*   PROTTOTAL  --   --  5.8*   BILITOTAL  --   --  0.3   ALKPHOS  --   --  160*   ALT  --   --  15   AST  --   --  31       Imaging:   No results found for this or any previous visit (from the past 24 hour(s)).

## 2021-06-09 ENCOUNTER — APPOINTMENT (OUTPATIENT)
Dept: PHYSICAL THERAPY | Facility: CLINIC | Age: 66
DRG: 441 | End: 2021-06-09
Attending: INTERNAL MEDICINE
Payer: MEDICARE

## 2021-06-09 ENCOUNTER — APPOINTMENT (OUTPATIENT)
Dept: ULTRASOUND IMAGING | Facility: CLINIC | Age: 66
DRG: 441 | End: 2021-06-09
Attending: PHYSICIAN ASSISTANT
Payer: MEDICARE

## 2021-06-09 ENCOUNTER — APPOINTMENT (OUTPATIENT)
Dept: OCCUPATIONAL THERAPY | Facility: CLINIC | Age: 66
DRG: 441 | End: 2021-06-09
Attending: INTERNAL MEDICINE
Payer: MEDICARE

## 2021-06-09 LAB
ABO + RH BLD: NORMAL
ABO + RH BLD: NORMAL
ALBUMIN FLD-MCNC: 0.4 G/DL
ALBUMIN SERPL-MCNC: 2.3 G/DL (ref 3.4–5)
ALP SERPL-CCNC: 144 U/L (ref 40–150)
ALT SERPL W P-5'-P-CCNC: 13 U/L (ref 0–70)
AMMONIA PLAS-SCNC: 46 UMOL/L (ref 10–50)
ANION GAP SERPL CALCULATED.3IONS-SCNC: 5 MMOL/L (ref 3–14)
APPEARANCE FLD: NORMAL
AST SERPL W P-5'-P-CCNC: 18 U/L (ref 0–45)
BILIRUB SERPL-MCNC: 0.4 MG/DL (ref 0.2–1.3)
BLD GP AB SCN SERPL QL: NORMAL
BLD PROD TYP BPU: NORMAL
BLD PROD TYP BPU: NORMAL
BLD UNIT ID BPU: 0
BLOOD BANK CMNT PATIENT-IMP: NORMAL
BLOOD PRODUCT CODE: NORMAL
BPU ID: NORMAL
BUN SERPL-MCNC: 26 MG/DL (ref 7–30)
CALCIUM SERPL-MCNC: 7.7 MG/DL (ref 8.5–10.1)
CHLORIDE SERPL-SCNC: 106 MMOL/L (ref 94–109)
CO2 SERPL-SCNC: 22 MMOL/L (ref 20–32)
COLOR FLD: NORMAL
CREAT SERPL-MCNC: 2.2 MG/DL (ref 0.66–1.25)
ERYTHROCYTE [DISTWIDTH] IN BLOOD BY AUTOMATED COUNT: 16.5 % (ref 10–15)
GFR SERPL CREATININE-BSD FRML MDRD: 30 ML/MIN/{1.73_M2}
GLUCOSE SERPL-MCNC: 133 MG/DL (ref 70–99)
HCT VFR BLD AUTO: 22.9 % (ref 40–53)
HGB BLD-MCNC: 7.2 G/DL (ref 13.3–17.7)
LYMPHOCYTES NFR FLD MANUAL: 16 %
MCH RBC QN AUTO: 30.8 PG (ref 26.5–33)
MCHC RBC AUTO-ENTMCNC: 31.4 G/DL (ref 31.5–36.5)
MCV RBC AUTO: 98 FL (ref 78–100)
MONOS+MACROS NFR FLD MANUAL: 70 %
NEUTS BAND NFR FLD MANUAL: 8 %
NUM BPU REQUESTED: 1
OTHER CELLS FLD MANUAL: 6 %
PLATELET # BLD AUTO: 95 10E9/L (ref 150–450)
POTASSIUM SERPL-SCNC: 4 MMOL/L (ref 3.4–5.3)
PROT FLD-MCNC: 1 G/DL
PROT SERPL-MCNC: 5.5 G/DL (ref 6.8–8.8)
RBC # BLD AUTO: 2.34 10E12/L (ref 4.4–5.9)
SODIUM SERPL-SCNC: 133 MMOL/L (ref 133–144)
SPECIMEN EXP DATE BLD: NORMAL
SPECIMEN SOURCE FLD: NORMAL
TRANSFUSION STATUS PATIENT QL: NORMAL
TRANSFUSION STATUS PATIENT QL: NORMAL
WBC # BLD AUTO: 5.1 10E9/L (ref 4–11)
WBC # FLD AUTO: 168 /UL

## 2021-06-09 PROCEDURE — 97165 OT EVAL LOW COMPLEX 30 MIN: CPT | Mod: GO

## 2021-06-09 PROCEDURE — 97116 GAIT TRAINING THERAPY: CPT | Mod: GP

## 2021-06-09 PROCEDURE — 86900 BLOOD TYPING SEROLOGIC ABO: CPT | Performed by: INTERNAL MEDICINE

## 2021-06-09 PROCEDURE — 250N000013 HC RX MED GY IP 250 OP 250 PS 637: Performed by: INTERNAL MEDICINE

## 2021-06-09 PROCEDURE — 99233 SBSQ HOSP IP/OBS HIGH 50: CPT | Performed by: INTERNAL MEDICINE

## 2021-06-09 PROCEDURE — 49083 ABD PARACENTESIS W/IMAGING: CPT

## 2021-06-09 PROCEDURE — 82140 ASSAY OF AMMONIA: CPT | Performed by: PHYSICIAN ASSISTANT

## 2021-06-09 PROCEDURE — 36415 COLL VENOUS BLD VENIPUNCTURE: CPT | Performed by: INTERNAL MEDICINE

## 2021-06-09 PROCEDURE — 89051 BODY FLUID CELL COUNT: CPT | Performed by: PHYSICIAN ASSISTANT

## 2021-06-09 PROCEDURE — 120N000001 HC R&B MED SURG/OB

## 2021-06-09 PROCEDURE — 80053 COMPREHEN METABOLIC PANEL: CPT | Performed by: INTERNAL MEDICINE

## 2021-06-09 PROCEDURE — 36415 COLL VENOUS BLD VENIPUNCTURE: CPT | Performed by: PHYSICIAN ASSISTANT

## 2021-06-09 PROCEDURE — 97161 PT EVAL LOW COMPLEX 20 MIN: CPT | Mod: GP

## 2021-06-09 PROCEDURE — 82042 OTHER SOURCE ALBUMIN QUAN EA: CPT | Performed by: PHYSICIAN ASSISTANT

## 2021-06-09 PROCEDURE — 86923 COMPATIBILITY TEST ELECTRIC: CPT | Performed by: INTERNAL MEDICINE

## 2021-06-09 PROCEDURE — 0W9G3ZZ DRAINAGE OF PERITONEAL CAVITY, PERCUTANEOUS APPROACH: ICD-10-PCS | Performed by: RADIOLOGY

## 2021-06-09 PROCEDURE — P9047 ALBUMIN (HUMAN), 25%, 50ML: HCPCS | Performed by: INTERNAL MEDICINE

## 2021-06-09 PROCEDURE — 85027 COMPLETE CBC AUTOMATED: CPT | Performed by: INTERNAL MEDICINE

## 2021-06-09 PROCEDURE — 97530 THERAPEUTIC ACTIVITIES: CPT | Mod: GO

## 2021-06-09 PROCEDURE — 999N000154 HC STATISTIC RADIOLOGY XRAY, US, CT, MAR, NM

## 2021-06-09 PROCEDURE — P9016 RBC LEUKOCYTES REDUCED: HCPCS | Performed by: INTERNAL MEDICINE

## 2021-06-09 PROCEDURE — 250N000011 HC RX IP 250 OP 636: Performed by: INTERNAL MEDICINE

## 2021-06-09 PROCEDURE — 84157 ASSAY OF PROTEIN OTHER: CPT | Performed by: PHYSICIAN ASSISTANT

## 2021-06-09 PROCEDURE — 86901 BLOOD TYPING SEROLOGIC RH(D): CPT | Performed by: INTERNAL MEDICINE

## 2021-06-09 PROCEDURE — 97535 SELF CARE MNGMENT TRAINING: CPT | Mod: GO

## 2021-06-09 PROCEDURE — 86850 RBC ANTIBODY SCREEN: CPT | Performed by: INTERNAL MEDICINE

## 2021-06-09 RX ORDER — ALBUMIN (HUMAN) 12.5 G/50ML
25 SOLUTION INTRAVENOUS EVERY 8 HOURS
Status: DISCONTINUED | OUTPATIENT
Start: 2021-06-09 | End: 2021-06-09

## 2021-06-09 RX ORDER — ALBUMIN (HUMAN) 12.5 G/50ML
25 SOLUTION INTRAVENOUS EVERY 8 HOURS
Status: COMPLETED | OUTPATIENT
Start: 2021-06-09 | End: 2021-06-10

## 2021-06-09 RX ORDER — LIDOCAINE HYDROCHLORIDE 10 MG/ML
10 INJECTION, SOLUTION EPIDURAL; INFILTRATION; INTRACAUDAL; PERINEURAL ONCE
Status: COMPLETED | OUTPATIENT
Start: 2021-06-09 | End: 2021-06-09

## 2021-06-09 RX ADMIN — GABAPENTIN 300 MG: 300 CAPSULE ORAL at 08:00

## 2021-06-09 RX ADMIN — VORTIOXETINE 10 MG: 10 TABLET, FILM COATED ORAL at 20:44

## 2021-06-09 RX ADMIN — TAMSULOSIN HYDROCHLORIDE 0.8 MG: 0.4 CAPSULE ORAL at 08:00

## 2021-06-09 RX ADMIN — QUETIAPINE FUMARATE 200 MG: 100 TABLET ORAL at 22:09

## 2021-06-09 RX ADMIN — CEFTRIAXONE SODIUM 1 G: 1 INJECTION, POWDER, FOR SOLUTION INTRAMUSCULAR; INTRAVENOUS at 11:13

## 2021-06-09 RX ADMIN — MULTIPLE VITAMINS W/ MINERALS TAB 1 TABLET: TAB at 08:00

## 2021-06-09 RX ADMIN — HYDROMORPHONE HYDROCHLORIDE 0.5 MG: 1 INJECTION, SOLUTION INTRAMUSCULAR; INTRAVENOUS; SUBCUTANEOUS at 13:20

## 2021-06-09 RX ADMIN — TRAZODONE HYDROCHLORIDE 100 MG: 100 TABLET ORAL at 22:09

## 2021-06-09 RX ADMIN — LIDOCAINE HYDROCHLORIDE 10 ML: 10 INJECTION, SOLUTION EPIDURAL; INFILTRATION; INTRACAUDAL; PERINEURAL at 13:48

## 2021-06-09 RX ADMIN — ALBUMIN HUMAN 25 G: 0.25 SOLUTION INTRAVENOUS at 18:26

## 2021-06-09 RX ADMIN — CARBIDOPA AND LEVODOPA 2.5 MG: 50; 200 TABLET, EXTENDED RELEASE ORAL at 18:26

## 2021-06-09 RX ADMIN — HYDROMORPHONE HYDROCHLORIDE 0.5 MG: 1 INJECTION, SOLUTION INTRAMUSCULAR; INTRAVENOUS; SUBCUTANEOUS at 23:24

## 2021-06-09 RX ADMIN — PANTOPRAZOLE SODIUM 40 MG: 40 TABLET, DELAYED RELEASE ORAL at 20:45

## 2021-06-09 RX ADMIN — PANTOPRAZOLE SODIUM 40 MG: 40 TABLET, DELAYED RELEASE ORAL at 08:00

## 2021-06-09 RX ADMIN — MIRTAZAPINE 30 MG: 15 TABLET, FILM COATED ORAL at 22:09

## 2021-06-09 RX ADMIN — OXYCODONE HYDROCHLORIDE 10 MG: 5 TABLET ORAL at 06:51

## 2021-06-09 RX ADMIN — ALBUMIN HUMAN 25 G: 0.25 SOLUTION INTRAVENOUS at 11:50

## 2021-06-09 RX ADMIN — QUETIAPINE FUMARATE 50 MG: 25 TABLET ORAL at 07:59

## 2021-06-09 RX ADMIN — CARBIDOPA AND LEVODOPA 2.5 MG: 50; 200 TABLET, EXTENDED RELEASE ORAL at 08:00

## 2021-06-09 RX ADMIN — VORTIOXETINE 10 MG: 10 TABLET, FILM COATED ORAL at 08:00

## 2021-06-09 RX ADMIN — GABAPENTIN 300 MG: 300 CAPSULE ORAL at 15:03

## 2021-06-09 RX ADMIN — CARBIDOPA AND LEVODOPA 2.5 MG: 50; 200 TABLET, EXTENDED RELEASE ORAL at 12:56

## 2021-06-09 RX ADMIN — OXYCODONE HYDROCHLORIDE 10 MG: 5 TABLET ORAL at 20:54

## 2021-06-09 RX ADMIN — FOLIC ACID 1 MG: 1 TABLET ORAL at 08:00

## 2021-06-09 RX ADMIN — GABAPENTIN 300 MG: 300 CAPSULE ORAL at 22:09

## 2021-06-09 RX ADMIN — TAMSULOSIN HYDROCHLORIDE 0.4 MG: 0.4 CAPSULE ORAL at 08:08

## 2021-06-09 RX ADMIN — ONDANSETRON 4 MG: 2 INJECTION INTRAMUSCULAR; INTRAVENOUS at 07:21

## 2021-06-09 RX ADMIN — THIAMINE HCL TAB 100 MG 100 MG: 100 TAB at 08:00

## 2021-06-09 ASSESSMENT — ACTIVITIES OF DAILY LIVING (ADL)
ADLS_ACUITY_SCORE: 13

## 2021-06-09 NOTE — PROGRESS NOTES
[facility]    HOSPITALIST PROGRESS NOTE :   --------------------------------------------------    Date of Admission:  6/6/2021    Cumulative Summary: Jim Richard is a 66 year old male with past medical history significant for persistent alcohol abuse, alcoholic cirrhosis, recurrent ascites requiring weekly to biweekly paracentesis, COPD, CAD, esophageal varices, essential hypertension, peripheral vascular disease, pancytopenia, chronic kidney disease or chronic thrombocytopenia and anemia presented to the emergency room on June 5th due to the concern for new and acute abdominal pain with distention associated with ascites.  Patient admitted drinking a pint of vodka the night before.    Assessment & Plan     Acute generalized abdominal pain: Concern for possible spontaneous bacterial peritonitis.  Acute renal failure on top of chronic kidney disease; low urine output, patient does not seem to be on any diuretics at home, denying following up with gastroenterology as an outpatient, at this time there is also concern for development of hepatorenal syndrome.  Recurrent large-volume ascites due to alcoholic liver disease: According to patient, he is requiring paracentesis every 2 weeks and usually requires anywhere from 5 to 7 L of fluid removal.  According to patient, he underwent fluid removal in the emergency department where couple of liters of fluid was removed.  This morning patient remains significantly uncomfortable with distended abdomen, denying any similar pain in the past with the presence of ascites only.  Initial fluid analysis showing only 10 WBC count does not seem to be concerning for SBP.  06/07/21:Status post paracentesis , 5500 ml yellow fluid was removed , in addition to couple of liters removed in ED  Alcoholic liver cirrhosis; unfortunately patient continues to consume alcohol and admits consuming 1 pint of vodka the night before coming to the hospital.  Discussed  with him regarding my concern for significantly associated morbidity and mortality with ongoing alcohol consumption  End-stage liver disease  Pancytopenia associated with liver cirrhosis  Acute on chronic anemia: Patient is denying any signs and symptoms of bleeding, hemoglobin was down to 7.6.    -- Patient was seen and examined, will give him albumin and will order paracentesis again, creatinine is worsened he is very light headed when he stands up   -- will order paracentesis with pre and post albumin infusion  -- will also give him one unit of PRBC transfusion due to symptomatic anemia and patient feeling worse today then yesterday   -- will transfuse after patient has undergone paracentesis   -- ceftriaxone for 7 days   -- will need rehab   -- leave jiang today due to worsening renal function , plan to remove tomorrow   -- discussed with him that he is not doing well due to his advanced liver disease , understands the importance of staying sober , told me that even when he wants to quit, he has relapsed in the past    -- ammonia is ordered by GI, so far patient does not have any signs and symptoms of encephalopathy at this point   -- check CMP tomorrow   -- Gastroenterology has been following , appreciate their help  - Patient does not seem to be on any home diuretics, and probably will not be able to get started on any diuretics at this time due to renal functions.  -- Follow-up on fluid cultures from paracentesis from yesterday.  -- We will check fecal occult blood test due to drop in hemoglobin, came back negative   -- Continue Nexium   -- Continue patient on Midodrine 2.5 mg p.o. 3 times daily    Acute renal failure on top of CKD stage III-IV: His creatinine at baseline seems to be close to 1.6, now creatinine is 1.98, low urine output.  Type 2 hepatorenal syndrome:      -- Treatment as above for hepatorenal syndrome   -- Continue Jiang catheter.  -- Recheck creatinine tomorrow   -- Avoid nephrotoxic  agents.    Right Umbilical hernia :  -- Have painful right lower abdominal hernia , not able to be reduced , patient wants to know if any thing can be done about it , discussed with him that at this point I don't think surgery will proceed with any procedure right away due to co morbidities and concern for type 2 hepatorenal syndrome   -- Patient was seen by surgery , high risk for complications , not a good candidate to go for surgery, no signs for hernia incarceration     Acute urinary retention  History of benign prostate hyperplasia  --Patient was retaining more than 800 mL ,also have acute renal failure, was unable to pass any urine, Jiang catheter is placed , continue today  --Strict intake and output.  --Continue patient on Flomax 0.8 mg p.o. daily.  -- keep jiang catheter today     History of toxic encephalopathy from alcohol intoxication: Currently no signs and symptoms of encephalopathy this morning, seems to be able to answer all the questions.  Patient remains at very high risk for withdrawal though no signs seen thus far.    -- Continue CIWA with Ativan.  -- multivitamin.  -- Continue patient on thiamine and folic acid.  -- Patient will not be started at this time on any clonidine due to hypotension  --We will continue patient on PTA dose of gabapentin 300 mg p.o. 3 times a day but will not give patient high dose of tapering Neurontin at this point.  -- GI is checking ammonia as above, patient does not have currently any signs and symptoms for encephalopathy     Essential hypertension  -- Patient does not seem to be taking any antihypertensive medication, continue to monitor.    COPD  -- Continue PTA Breo Ellipta 200/25 1 puff daily as needed.    Obstructive sleep apnea  --Continue CPAP    Chronic depression and anxiety  --Continue PTA Vortioxetine ,mirtazapine and trazodone and Seroquel    Diet: Regular Diet Adult    Jiang Catheter: in place, indication: Retention    DVT Prophylaxis: Pneumatic  Compression Devices  Code Status: No CPR- Do NOT Intubate    The patient's care was discussed with the Bedside Nurse, Care Coordinator/ and Patient.    Disposition Plan   Expected discharge: 2 - 3 days,PT and OT is recommending STR, remians significantly sick from hepatorenal syndrome and does not seem to be making clinical improvement in the last few days    More than 35 min of the time was spend in care today, more than 50% of the time was spent in patient care coordination and counseling.    Tiff Rodriguez MD, FACP  Text Page (7am - 6pm)    ----------------------------------------------------------------------------------------------------------------------    Interval History    Patient was seen and examined, sitting in chair , does not feel well , more lightheaded and gets winded easily with any movement and exertion, in agreement to get the blood transfusion, in lots of discomfort from abdominal distention, creatinine is worsening , discussed with him regarding his clinical status in detail , understands that so far he is not making improvement despite aggressive medical management     -Data reviewed today: I reviewed all new labs and imaging results over the last 24 hours.    I personally reviewed no images or EKG's today.    Physical Exam   Temp: 98.4  F (36.9  C) Temp src: Oral BP: 124/71 Pulse: 83   Resp: 16 SpO2: 95 % O2 Device: None (Room air)    Vitals:    06/06/21 1301 06/07/21 1801 06/08/21 0700   Weight: 86.2 kg (190 lb) 74.7 kg (164 lb 11.2 oz) 75.2 kg (165 lb 11.2 oz)     Vital Signs with Ranges  Temp:  [98.4  F (36.9  C)-98.7  F (37.1  C)] 98.4  F (36.9  C)  Pulse:  [67-83] 83  Resp:  [16] 16  BP: (124-134)/(71-76) 124/71  SpO2:  [95 %-97 %] 95 %  I/O last 3 completed shifts:  In: -   Out: 1950 [Urine:1950]    GENERAL: Alert , awake and oriented. NAD. Conversational, appropriate.  Looks chronically sick  HEENT: Normocephalic. EOMI. No icterus or injection. Nares normal.   LUNGS: Clear  to auscultation with decreased air entry in the bases, no wheezing, no crackles  HEART: Regular rate. Extremities perfused.   ABDOMEN: Firm and more distended , worsened as compared to yesterday ,generalized tenderness no guarding or rigidity, no rebound tenderness at this point.  EXTREMITIES: No LE edema noted.   NEUROLOGIC: Moves extremities x4 on command. No acute focal neurologic abnormalities noted.     Medications     - MEDICATION INSTRUCTIONS -         albumin human  25 g Intravenous Q8H     cefTRIAXone  1 g Intravenous Q24H     folic acid  1 mg Oral Daily     gabapentin  300 mg Oral TID     midodrine  2.5 mg Oral TID     mirtazapine  30 mg Oral At Bedtime     multivitamin w/minerals  1 tablet Oral Daily     nicotine   Transdermal Q8H     pantoprazole  40 mg Oral BID     QUEtiapine  200 mg Oral At Bedtime     sodium chloride (PF)  3 mL Intracatheter Q8H     tamsulosin  0.8 mg Oral Daily     vitamin B1  100 mg Oral Daily     traZODone  100 mg Oral At Bedtime     vortioxetine  10 mg Oral BID       Data   Recent Labs   Lab 06/09/21  1028 06/08/21  0824 06/07/21  1848 06/07/21  0839 06/06/21  0600   WBC 5.1  --   --  3.7* 3.6*   HGB 7.2* 7.2*  --  7.8* 7.4*   MCV 98  --   --  100 100   PLT 95*  --   --  109* 111*   INR  --   --   --   --  1.31*    133 131* 136 141   POTASSIUM 4.0 4.1 4.2 3.9 4.0   CHLORIDE 106 107 105 109 114*   CO2 22 21 21 20 19*   BUN 26 28 29 29 32*   CR 2.20* 1.97* 2.03* 1.91* 1.98*   ANIONGAP 5 5 5 7 8   KEV 7.7* 7.6* 7.8* 8.1* 7.6*   * 98 134* 77 81   ALBUMIN 2.3* 2.3*  --   --  2.2*   PROTTOTAL 5.5* 5.4*  --   --  5.8*   BILITOTAL 0.4 0.5  --   --  0.3   ALKPHOS 144 132  --   --  160*   ALT 13 13  --   --  15   AST 18 18  --   --  31       Imaging:   No results found for this or any previous visit (from the past 24 hour(s)).

## 2021-06-09 NOTE — PLAN OF CARE
"DATE & TIME: 6/9/21 0700-1930       Cognitive Concerns/ Orientation : A&O x 4    BEHAVIOR & AGGRESSION TOOL COLOR: green  CIWA SCORE: NA      ABNL VS/O2: VSS on RA  MOBILITY: Ax1 GB/walker, up in chair for meals.  PAIN MANAGMENT: denied pain  DIET: Regular- encouraged PO  BOWEL/BLADDER: Leger patent with good UOP  ABNL LAB/BG: Cr. 2.20, hgb 7.2, Albumin 2.3  DRAIN/DEVICES: PIV Saline Locked, intermittent Abx    TELEMETRY RHYTHM: NA  TESTS/PROCEDURES: Paracentesis today 6/9/21 4L off     D/C DATE: Abdomen distended pt was very uncomfortable today felt like he needed to be \"drained\" Contacted GI they ordered a paracentesis. Done un ultrasound today around 1400. 4L taken out. BS active, para site on left abdomen CDI from para on 6/7, R Abdomen para site from 6/9 CDI with a band aid. Hgb 7.2 again today, pt is symptomatic with light headedness. MD ordered 1 Unit PRBC to be infused after Para. Restarted Albumin Q8, Scattered bruises to both arms, abrasion to knee/back.  Continue IV rocephin/albumin. NICOLE. GI following.   "

## 2021-06-09 NOTE — PROGRESS NOTES
06/09/21 1035   Quick Adds   Type of Visit Initial PT Evaluation   Living Environment   People in home alone   Current Living Arrangements   ([facility] )   Home Accessibility stairs to enter home;stairs within home   Number of Stairs, Main Entrance 4   Stair Railings, Main Entrance railing on right side (ascending)   Number of Stairs, Within Home, Primary other (see comments)  (6 steps up or down)   Stair Railings, Within Home, Primary railing on right side (ascending)   Transportation Anticipated car, drives self   Living Environment Comments Split level home, bed/bath/kitchen on upper level. Enters from garage onto lower level typically, also can enter from front entrance (4 steps) to landing.    Self-Care   Usual Activity Tolerance good   Current Activity Tolerance fair   Activity/Exercise/Self-Care Comment Pt reports he uses walker outside the house and furniture/ wall walks inside the house.   Disability/Function   Walking or Climbing Stairs Difficulty yes   Walking or Climbing Stairs ambulation difficulty, requires equipment;other (see comments)  (uses walker outside home)   Fall history within last six months yes   Number of times patient has fallen within last six months   (3-4 per pt)   Change in Functional Status Since Onset of Current Illness/Injury yes   General Information   Onset of Illness/Injury or Date of Surgery 06/06/21   Referring Physician Tiff Rodriguez MD   Patient/Family Therapy Goals Statement (PT) not stated   Pertinent History of Current Problem (include personal factors and/or comorbidities that impact the POC) Patient admitted o [facility] 6/6 with abdominal pain with distension & associated acites and weakness. He rolled out of bed onto the floor at home and was unable to get up so called PD. PMH signficant for persistent alcohol abuse, alcoholic cirrhosis, recurrent ascites requiring weekly to biweekly paracentesis, COPD, CAD, esophageal varices, essential hypertension, peripheral vascular  disease, pancytopenia, chronic kidney disease or chronic thrombocytopenia and anemia.   Existing Precautions/Restrictions fall   Cognition   Orientation Status (Cognition) oriented x 4   Pain Assessment   Patient Currently in Pain Yes, see Vital Sign flowsheet  (7/10 upper abdomen)   Posture    Posture Forward head position;Protracted shoulders   Range of Motion (ROM)   ROM Comment LE ROM appears WFL with bed mobility and transfers   Strength   Strength Comments Pt demos functional weakness with bed mobility and transfers   Bed Mobility   Comment (Bed Mobility) Supine>sit from flat bed with use of momentum strategy to propel into sitting, SBA   Transfers   Transfer Safety Comments Sit>stand from EOB with FWW and CGA   Gait/Stairs (Locomotion)   Comment (Gait/Stairs) Patient ambulated in room and muniz with FWW and close SBA-CGA primarily, with 1 LOB/ misstep with Gil provided to recover   Balance   Balance Comments Needs B UE support on walker and CGA-Gil for safe dynamic mobility   Clinical Impression   Criteria for Skilled Therapeutic Intervention yes, treatment indicated   PT Diagnosis (PT) decreased independence with mobility   Influenced by the following impairments impaired dynamic balance, pain, decreased strength, decreased activity tolerance   Functional limitations due to impairments difficulty with bed mobility, transfers, ambulation, stairs   Clinical Presentation Stable/Uncomplicated   Clinical Presentation Rationale clinical judgement   Clinical Decision Making (Complexity) low complexity   Therapy Frequency (PT) 5x/week   Predicted Duration of Therapy Intervention (days/wks) 4 days   Planned Therapy Interventions (PT) balance training;bed mobility training;cryotherapy;gait training;home exercise program;neuromuscular re-education;stair training;strengthening;transfer training   Anticipated Equipment Needs at Discharge (PT)   (patient has front wheeled walker at home)   Risk & Benefits of therapy have  been explained evaluation/treatment results reviewed;care plan/treatment goals reviewed;current/potential barriers reviewed;risks/benefits reviewed;participants voiced agreement with care plan;participants included;patient   PT Discharge Planning    PT Discharge Recommendation (DC Rec) Transitional Care Facility   PT Rationale for DC Rec Patient is unsteady ambulating and is fall risk. He lives alone and will benefit from continued skilled therapies to progress safety and independence with mobility.   PT Brief overview of current status  SBA bed mobility, CGA transfers & ambulation with walker ~200'; 1 LOB while ambulating with Gil to recover   Total Evaluation Time   Total Evaluation Time (Minutes) 6

## 2021-06-09 NOTE — PROGRESS NOTES
[facility]  Gastroenterology Progress Note     Jim Richard MRN# [id number]   YOB: 1955 Age: 66 year old          Assessment and Plan:   Jim Richard is a pleasant 66 year old male with alcoholic liver cirrhosis complicated with esophageal varices, abdominal ascites, hepatorenal syndrome, pancytopenia and c/o abdominal pain. GI consulted on 6/7/21.     Active Problems:  Alcoholic liver cirrhosis  Elevated Creatinine- hepatorenal syndrome  Abdominal pain  Abdominal ascites- concern for SBP  Hypoalbuminemia  Pancytopenia  Patient has suffered from chronic alcoholism and developed significant complications with decompensated liver cirrhosis. He has required paracentesis every 2 weeks. Has had elevating creatinine- currently 2.2 (05/21 1.58) He has type 2 hepatorenal syndrome. Has pancytopenia due to bone marrow suppression.  Albumin 2.3  Platelets 109  Hemoglobin 7.2 and stable with negative occult blood  MELD score 16  Low suspicion for SBP given cell counts. No organisms seen.     -- Worsening kidney function would not diuretics. Has hepatorenal syndrome  -- Continue midodrine and pantoprazole  -- Albumin started- will need to discontinue once albumin 3.5 or higher  -- Consider TIPS procedure in near future  -- Monitor CBC and BMP  -- Regular diet and encourage good nutrition  -- Check ammonia  -- Patient has ongoign abdominal distention- recommend therapeutic paracentesis          Acute renal failure, unspecified acute renal failure type (H)  End-stage liver disease (H)      Interval History:   no new complaints, denies chest pain, denies shortness of breath, alert, oriented to person, place and time and doing well; no cp, sob, n/v/d, or abd pain.              Review of Systems:   C: NEGATIVE for fever, chills, change in weight  E/M: NEGATIVE for ear, mouth and throat problems  R: NEGATIVE for significant cough or SOB  CV: NEGATIVE for chest pain, palpitations or  peripheral edema             Medications:   I have reviewed this patient's current medications    cefTRIAXone  1 g Intravenous Q24H     folic acid  1 mg Oral Daily     gabapentin  300 mg Oral TID     midodrine  2.5 mg Oral TID     mirtazapine  30 mg Oral At Bedtime     multivitamin w/minerals  1 tablet Oral Daily     nicotine   Transdermal Q8H     pantoprazole  40 mg Oral BID     QUEtiapine  200 mg Oral At Bedtime     sodium chloride (PF)  3 mL Intracatheter Q8H     tamsulosin  0.8 mg Oral Daily     vitamin B1  100 mg Oral Daily     traZODone  100 mg Oral At Bedtime     vortioxetine  10 mg Oral BID                  Physical Exam:   Vitals were reviewed  Vital Signs with Ranges  Temp:  [98.4  F (36.9  C)-98.7  F (37.1  C)] 98.4  F (36.9  C)  Pulse:  [67-83] 83  Resp:  [16] 16  BP: (124-134)/(71-76) 124/71  SpO2:  [95 %-97 %] 95 %  I/O last 3 completed shifts:  In: -   Out: 1950 [Urine:1950]  Constitutional: healthy, alert and no distress   Cardiovascular: negative, PMI normal. No lifts, heaves, or thrills. RRR. No murmurs, clicks gallops or rub  Respiratory: negative, Percussion normal. Good diaphragmatic excursion. Lungs clear  Abdomen: Abdomen soft, non-tender. BS normal. No masses, organomegaly           Data:   I reviewed the patient's new clinical lab test results.   Recent Labs   Lab Test 06/08/21  0824 06/07/21  0839 06/06/21  0600 05/01/21  1231 03/26/21  2131 03/26/21  2131   WBC  --  3.7* 3.6* 7.6  --  4.8   HGB 7.2* 7.8* 7.4* 8.0*  --  8.3*   MCV  --  100 100 94  --  97   PLT  --  109* 111* 208   < > 162   INR  --   --  1.31* 1.20*  --  1.24*    < > = values in this interval not displayed.     Recent Labs   Lab Test 06/08/21  0824 06/07/21  1848 06/07/21  0839   POTASSIUM 4.1 4.2 3.9   CHLORIDE 107 105 109   CO2 21 21 20   BUN 28 29 29   ANIONGAP 5 5 7     Recent Labs   Lab Test 06/08/21  0824 06/06/21  1023 06/06/21  0600 03/26/21  2131 03/13/21  0946 03/13/21  0946 03/11/21  0636 03/10/21  8501  10/06/20  1610 10/06/20  1610   ALBUMIN 2.3*  --  2.2* 2.7*   < > 2.9*  --  3.1*   < >  --    BILITOTAL 0.5  --  0.3 2.2*   < > 1.3  --  1.8*   < >  --    ALT 13  --  15 26   < > 37  --  39   < >  --    AST 18  --  31 36   < > 71*  --  89*   < >  --    PROTEIN  --  20*  --   --   --   --  70*  --   --  10*   LIPASE  --   --   --  444*  --  490*  --  703*   < >  --     < > = values in this interval not displayed.       I reviewed the patient's new imaging results.    All laboratory data reviewed  All imaging studies reviewed by me.    Gloria De Leon PA-C,  6/9/2021  [facility]  Office : [phone]  Cell: [phone] (Dr. Shaw)  Cell: [phone] (Gloria De Leon PA-C)

## 2021-06-09 NOTE — PROGRESS NOTES
"   06/09/21 0852   Quick Adds   Type of Visit Initial Occupational Therapy Evaluation   Living Environment   People in home alone   Current Living Arrangements other (see comments)  (Town house)   Home Accessibility stairs to enter home;stairs within home   Number of Stairs, Main Entrance none   Number of Stairs, Within Home, Primary other (see comments)  (6+6 to get to main)   Transportation Anticipated car, drives self   Self-Care   Usual Activity Tolerance good   Current Activity Tolerance fair   Equipment Currently Used at Home walker, standard;grab bar, tub/shower   Activity/Exercise/Self-Care Comment Pt reports he is indep with mobility and self-cares prior to hospitalization. States he doesn't have anyone to assist at home. A few weeks ago he fell and a neighbor helped him up but pt reports \"I cant rely on them\". Pt does have Meals on Wheels that provides a meal 1x/day   Disability/Function   Fall history within last six months yes   Number of times patient has fallen within last six months   (\"Couple of times\")   Change in Functional Status Since Onset of Current Illness/Injury yes   General Information   Onset of Illness/Injury or Date of Surgery 06/06/21   Referring Physician Tiff Rodriguez MD   Patient/Family Therapy Goal Statement (OT) return home   Additional Occupational Profile Info/Pertinent History of Current Problem Jim Richard is a 66 year old male with past medical history significant for persistent alcohol abuse, alcoholic cirrhosis, recurrent ascites requiring weekly to biweekly paracentesis, COPD, CAD, esophageal varices, essential hypertension, peripheral vascular disease, pancytopenia, chronic kidney disease or chronic thrombocytopenia and anemia presented to the emergency room on June 5th due to the concern for new and acute abdominal pain with distention associated with ascites.  Patient admitted drinking a pint of vodka the night before.   Existing Precautions/Restrictions fall "   Cognitive Status Examination   Orientation Status orientation to person, place and time   Affect/Mental Status (Cognitive) WNL   Follows Commands follows one-step commands;follows two-step commands;over 90% accuracy   Visual Perception   Visual Impairment/Limitations WNL   Sensory   Sensory Quick Adds No deficits were identified   Pain Assessment   Patient Currently in Pain Yes, see Vital Sign flowsheet  (5/10 abdomen)   Integumentary/Edema   Integumentary/Edema other (describe)   Integumentary/Edema Comments distended abdomen   Posture   Posture not impaired   Range of Motion Comprehensive   General Range of Motion upper extremity range of motion deficits identified   Comment, General Range of Motion slightly limited into B shld flex   Strength Comprehensive (MMT)   General Manual Muscle Testing (MMT) Assessment upper extremity strength deficits identified   Comment, General Manual Muscle Testing (MMT) Assessment generalized weakness at 4/5 throughout   Coordination   Upper Extremity Coordination No deficits were identified   Bed Mobility   Bed Mobility supine-sit;sit-supine   Supine-Sit Monroe (Bed Mobility) supervision   Sit-Supine [place] (Bed Mobility) supervision   Transfers   Transfers sit-stand transfer;bed-chair transfer   Transfer Comments Min A with FWW and emerging SBA   Activities of Daily Living   BADL Assessment/Intervention lower body dressing;grooming   Lower Body Dressing Assessment/Training   Monroe Level (Lower Body Dressing) supervision   Grooming Assessment/Training   Monroe Level (Grooming) supervision   Clinical Impression   Criteria for Skilled Therapeutic Interventions Met (OT) yes;meets criteria;skilled treatment is necessary   OT Diagnosis Impaired ADLs and functional mobility   OT Problem List-Impairments impacting ADL problems related to;activity tolerance impaired;mobility;range of motion (ROM);strength   Assessment of Occupational Performance 1-3 Performance  Deficits   Identified Performance Deficits functional mobility, home management   Planned Therapy Interventions (OT) ADL retraining;strengthening;transfer training;home program guidelines;progressive activity/exercise   Clinical Decision Making Complexity (OT) low complexity   Therapy Frequency (OT) Daily   Predicted Duration of Therapy 3   Risk & Benefits of therapy have been explained evaluation/treatment results reviewed;care plan/treatment goals reviewed   OT Discharge Planning    OT Discharge Recommendation (DC Rec) home with home care occupational therapy   OT Rationale for DC Rec Pt is below baseline for self-cares and functional mobility. Pt completing with SBA-min A. Pt has no assist at home but anticipate with further medical management, pt able to return home at mod I.    Total Evaluation Time (Minutes)   Total Evaluation Time (Minutes) 10

## 2021-06-09 NOTE — PROGRESS NOTES
Care Suites Procedure Nursing Note    Patient Information  Name: Jim Richard  Age: 66 year old    Procedure  Procedure: paracentesis  Procedure start time: 1345  Procedure complete time:   Concerns/abnormal assessment: no.  4 liters of Orange fluid drained fromRLQ. minna well. Band aid placed.CDI.Report called to Rowan OVALLE.  If abnormal assessment, provider notified: N/A   Plan/Other: monitor.    Brad Mccullough RN

## 2021-06-10 ENCOUNTER — APPOINTMENT (OUTPATIENT)
Dept: OCCUPATIONAL THERAPY | Facility: CLINIC | Age: 66
DRG: 441 | End: 2021-06-10
Payer: MEDICARE

## 2021-06-10 ENCOUNTER — APPOINTMENT (OUTPATIENT)
Dept: ULTRASOUND IMAGING | Facility: CLINIC | Age: 66
DRG: 441 | End: 2021-06-10
Attending: PHYSICIAN ASSISTANT
Payer: MEDICARE

## 2021-06-10 ENCOUNTER — APPOINTMENT (OUTPATIENT)
Dept: PHYSICAL THERAPY | Facility: CLINIC | Age: 66
DRG: 441 | End: 2021-06-10
Payer: MEDICARE

## 2021-06-10 LAB
ALBUMIN FLD-MCNC: 0.5 G/DL
ALBUMIN SERPL-MCNC: 3.1 G/DL (ref 3.4–5)
ALP SERPL-CCNC: 156 U/L (ref 40–150)
ALT SERPL W P-5'-P-CCNC: 17 U/L (ref 0–70)
ANION GAP SERPL CALCULATED.3IONS-SCNC: 7 MMOL/L (ref 3–14)
APPEARANCE FLD: CLEAR
AST SERPL W P-5'-P-CCNC: 30 U/L (ref 0–45)
BILIRUB SERPL-MCNC: 0.6 MG/DL (ref 0.2–1.3)
BUN SERPL-MCNC: 28 MG/DL (ref 7–30)
CALCIUM SERPL-MCNC: 8 MG/DL (ref 8.5–10.1)
CHLORIDE SERPL-SCNC: 104 MMOL/L (ref 94–109)
CO2 SERPL-SCNC: 20 MMOL/L (ref 20–32)
COLOR FLD: YELLOW
CREAT SERPL-MCNC: 2.16 MG/DL (ref 0.66–1.25)
GFR SERPL CREATININE-BSD FRML MDRD: 31 ML/MIN/{1.73_M2}
GLUCOSE SERPL-MCNC: 107 MG/DL (ref 70–99)
HGB BLD-MCNC: 8.1 G/DL (ref 13.3–17.7)
LYMPHOCYTES NFR FLD MANUAL: 15 %
MONOS+MACROS NFR FLD MANUAL: 53 %
NEUTS BAND NFR FLD MANUAL: 7 %
OTHER CELLS FLD MANUAL: 25 %
POTASSIUM SERPL-SCNC: 4.2 MMOL/L (ref 3.4–5.3)
PROT FLD-MCNC: 1.2 G/DL
PROT SERPL-MCNC: 6.3 G/DL (ref 6.8–8.8)
SODIUM SERPL-SCNC: 131 MMOL/L (ref 133–144)
SPECIMEN SOURCE FLD: NORMAL
WBC # FLD AUTO: NORMAL /UL

## 2021-06-10 PROCEDURE — 97530 THERAPEUTIC ACTIVITIES: CPT | Mod: GO | Performed by: OCCUPATIONAL THERAPIST

## 2021-06-10 PROCEDURE — 80053 COMPREHEN METABOLIC PANEL: CPT | Performed by: INTERNAL MEDICINE

## 2021-06-10 PROCEDURE — 97535 SELF CARE MNGMENT TRAINING: CPT | Mod: GO | Performed by: OCCUPATIONAL THERAPIST

## 2021-06-10 PROCEDURE — 97116 GAIT TRAINING THERAPY: CPT | Mod: GP | Performed by: PHYSICAL THERAPY ASSISTANT

## 2021-06-10 PROCEDURE — 84157 ASSAY OF PROTEIN OTHER: CPT | Performed by: PHYSICIAN ASSISTANT

## 2021-06-10 PROCEDURE — 99233 SBSQ HOSP IP/OBS HIGH 50: CPT | Performed by: INTERNAL MEDICINE

## 2021-06-10 PROCEDURE — 250N000013 HC RX MED GY IP 250 OP 250 PS 637: Performed by: INTERNAL MEDICINE

## 2021-06-10 PROCEDURE — 0W9G3ZZ DRAINAGE OF PERITONEAL CAVITY, PERCUTANEOUS APPROACH: ICD-10-PCS | Performed by: RADIOLOGY

## 2021-06-10 PROCEDURE — 999N000154 HC STATISTIC RADIOLOGY XRAY, US, CT, MAR, NM

## 2021-06-10 PROCEDURE — 272N000706 US PARACENTESIS

## 2021-06-10 PROCEDURE — 250N000011 HC RX IP 250 OP 636: Performed by: INTERNAL MEDICINE

## 2021-06-10 PROCEDURE — 89051 BODY FLUID CELL COUNT: CPT | Performed by: PHYSICIAN ASSISTANT

## 2021-06-10 PROCEDURE — P9047 ALBUMIN (HUMAN), 25%, 50ML: HCPCS | Performed by: INTERNAL MEDICINE

## 2021-06-10 PROCEDURE — 99221 1ST HOSP IP/OBS SF/LOW 40: CPT | Performed by: REGISTERED NURSE

## 2021-06-10 PROCEDURE — 85018 HEMOGLOBIN: CPT | Performed by: INTERNAL MEDICINE

## 2021-06-10 PROCEDURE — 82042 OTHER SOURCE ALBUMIN QUAN EA: CPT | Performed by: PHYSICIAN ASSISTANT

## 2021-06-10 PROCEDURE — 120N000001 HC R&B MED SURG/OB

## 2021-06-10 PROCEDURE — 36415 COLL VENOUS BLD VENIPUNCTURE: CPT | Performed by: INTERNAL MEDICINE

## 2021-06-10 PROCEDURE — 250N000009 HC RX 250: Performed by: RADIOLOGY

## 2021-06-10 PROCEDURE — 99207 PR CDG-CODE INCORRECT PER BILLING BASED ON TIME: CPT | Performed by: REGISTERED NURSE

## 2021-06-10 RX ORDER — DEXTROSE MONOHYDRATE 25 G/50ML
25-50 INJECTION, SOLUTION INTRAVENOUS
Status: DISCONTINUED | OUTPATIENT
Start: 2021-06-10 | End: 2021-06-10

## 2021-06-10 RX ORDER — NICOTINE POLACRILEX 4 MG
15-30 LOZENGE BUCCAL
Status: DISCONTINUED | OUTPATIENT
Start: 2021-06-10 | End: 2021-06-10

## 2021-06-10 RX ORDER — LIDOCAINE HYDROCHLORIDE 10 MG/ML
10 INJECTION, SOLUTION EPIDURAL; INFILTRATION; INTRACAUDAL; PERINEURAL ONCE
Status: COMPLETED | OUTPATIENT
Start: 2021-06-10 | End: 2021-06-10

## 2021-06-10 RX ORDER — ALBUMIN (HUMAN) 12.5 G/50ML
12.5 SOLUTION INTRAVENOUS ONCE
Status: DISCONTINUED | OUTPATIENT
Start: 2021-06-10 | End: 2021-06-10

## 2021-06-10 RX ORDER — LIDOCAINE 40 MG/G
CREAM TOPICAL
Status: DISCONTINUED | OUTPATIENT
Start: 2021-06-10 | End: 2021-06-17 | Stop reason: HOSPADM

## 2021-06-10 RX ADMIN — FOLIC ACID 1 MG: 1 TABLET ORAL at 08:00

## 2021-06-10 RX ADMIN — QUETIAPINE FUMARATE 200 MG: 100 TABLET ORAL at 21:11

## 2021-06-10 RX ADMIN — CARBIDOPA AND LEVODOPA 2.5 MG: 50; 200 TABLET, EXTENDED RELEASE ORAL at 13:22

## 2021-06-10 RX ADMIN — MIRTAZAPINE 30 MG: 15 TABLET, FILM COATED ORAL at 21:11

## 2021-06-10 RX ADMIN — HYDROMORPHONE HYDROCHLORIDE 0.5 MG: 1 INJECTION, SOLUTION INTRAMUSCULAR; INTRAVENOUS; SUBCUTANEOUS at 22:35

## 2021-06-10 RX ADMIN — VORTIOXETINE 10 MG: 10 TABLET, FILM COATED ORAL at 08:01

## 2021-06-10 RX ADMIN — CEFTRIAXONE SODIUM 1 G: 1 INJECTION, POWDER, FOR SOLUTION INTRAMUSCULAR; INTRAVENOUS at 13:17

## 2021-06-10 RX ADMIN — QUETIAPINE FUMARATE 50 MG: 25 TABLET ORAL at 15:57

## 2021-06-10 RX ADMIN — OXYCODONE HYDROCHLORIDE 10 MG: 5 TABLET ORAL at 13:17

## 2021-06-10 RX ADMIN — OXYCODONE HYDROCHLORIDE 10 MG: 5 TABLET ORAL at 08:05

## 2021-06-10 RX ADMIN — GABAPENTIN 300 MG: 300 CAPSULE ORAL at 21:11

## 2021-06-10 RX ADMIN — TRAZODONE HYDROCHLORIDE 100 MG: 100 TABLET ORAL at 21:11

## 2021-06-10 RX ADMIN — GABAPENTIN 300 MG: 300 CAPSULE ORAL at 08:00

## 2021-06-10 RX ADMIN — GABAPENTIN 300 MG: 300 CAPSULE ORAL at 15:54

## 2021-06-10 RX ADMIN — QUETIAPINE FUMARATE 50 MG: 25 TABLET ORAL at 10:05

## 2021-06-10 RX ADMIN — LIDOCAINE HYDROCHLORIDE 10 ML: 10 INJECTION, SOLUTION EPIDURAL; INFILTRATION; INTRACAUDAL; PERINEURAL at 12:25

## 2021-06-10 RX ADMIN — THIAMINE HCL TAB 100 MG 100 MG: 100 TAB at 08:00

## 2021-06-10 RX ADMIN — MULTIPLE VITAMINS W/ MINERALS TAB 1 TABLET: TAB at 08:00

## 2021-06-10 RX ADMIN — TAMSULOSIN HYDROCHLORIDE 0.8 MG: 0.4 CAPSULE ORAL at 08:00

## 2021-06-10 RX ADMIN — VORTIOXETINE 10 MG: 10 TABLET, FILM COATED ORAL at 21:11

## 2021-06-10 RX ADMIN — CARBIDOPA AND LEVODOPA 2.5 MG: 50; 200 TABLET, EXTENDED RELEASE ORAL at 08:00

## 2021-06-10 RX ADMIN — OXYCODONE HYDROCHLORIDE 10 MG: 5 TABLET ORAL at 21:10

## 2021-06-10 RX ADMIN — ALBUMIN HUMAN 25 G: 0.25 SOLUTION INTRAVENOUS at 01:06

## 2021-06-10 RX ADMIN — CARBIDOPA AND LEVODOPA 2.5 MG: 50; 200 TABLET, EXTENDED RELEASE ORAL at 19:00

## 2021-06-10 RX ADMIN — ALBUMIN HUMAN 25 G: 0.25 SOLUTION INTRAVENOUS at 10:00

## 2021-06-10 RX ADMIN — OXYCODONE HYDROCHLORIDE 10 MG: 5 TABLET ORAL at 16:20

## 2021-06-10 RX ADMIN — NICOTINE 1 PATCH: 21 PATCH, EXTENDED RELEASE TRANSDERMAL at 08:08

## 2021-06-10 RX ADMIN — PANTOPRAZOLE SODIUM 40 MG: 40 TABLET, DELAYED RELEASE ORAL at 21:11

## 2021-06-10 RX ADMIN — PANTOPRAZOLE SODIUM 40 MG: 40 TABLET, DELAYED RELEASE ORAL at 08:00

## 2021-06-10 ASSESSMENT — ACTIVITIES OF DAILY LIVING (ADL)
ADLS_ACUITY_SCORE: 13

## 2021-06-10 ASSESSMENT — MIFFLIN-ST. JEOR: SCORE: 1517.45

## 2021-06-10 NOTE — CONSULTS
[facility]  Palliative Care Consultation Note    Patient: Jim Richard  Date of Admission:  6/6/2021    Requesting Clinician / Team: Dr Rodriguez  Reason for consult: C    Recommendations:    Goals of Care (see in depth discussion below): Restorative goals of care at this time. He states that he is considering the TIPS procedure. Not ready for hospice yet. Recommended that he live in a more supervised setting for more support. Avail himself to support groups/AA.. He declined palliative care clinic outpatient at this time.      Code status: No CPR / No Intubation    Advanced Care Planning:    Patient has a completed Health Care Directive: Yes, and on file. Reviewed HCD and information is as he wishes, he does not wish to create a new document.    MADISON Quiñonez [facility]  Contact information available via [facility] Paging/Directory      Thank you for the opportunity to participate in the care of this patient and family. Our team: does not plan on following further, however do not hesitate to call or re-consult if we can be of further assistance to the patient/family.     During regular M-F work hours ([phone]) -- if you are not sure who specifically to contact -- please contact us on [facility] Smart Web.     After regular work hours and on weekends/holidays, you can call our answering service at [phone].     Attestation:  Total time on the floor involved in the patient's care: 55 minutes  Total time spent in counseling/care coordination: >50%    Assessments:  Jim Richard is a 66 year old male with PMH significant for alcoholic liver cirrhosis complicated with esophageal varices, abdominal ascites, hepatorenal syndrome, pancytopenia, c/o abdominal pain, anxiety and depression. His chronic alcoholism has led to  significant complications with decompensated liver cirrhosis and frequent hospitalizations- this is his 4th admission since January 2021. He  "now requires paracentesis every 2 weeks and has had elevating creatinine-  type 2 hepatorenal syndrome. Has pancytopenia due to bone marrow suppression. MELD score of 16.     Today, the patient was seen for:  Goals of care-   Introduced palliative care services and our multidisciplinary team. That we offer an extra layer of support for patient's with serious, life threatening illnesses, symptom management, emotional and spiritual support as well as assistance with complex medical decision making.  Jim was confused about palliative care and thought maybe we provided group support meetings in the outpatient setting.  I explained that we had a palliative care outpatient clinic where patients could visit for assistance with symptom management and ongoing goals of care discussions and support.  Jim wondered if palliative care had a facility he could live in and I again reviewed palliative care as a medical specialty where we help seriously ill individuals discuss their wishes regarding the medical treatments they wish to receive or if there comes a time when a person no longer wants to receive restorative focused care and wishes to transition to comfort focused care.  Jim states that he has been speaking with gastrointestinal specialist and has been considering the tips procedure.  He has regular paracentesis for his ascites which helps him feel more comfortable.  He wishes to keep undergoing treatments that can help him as he struggles to quit drinking.  He feels regret regarding his continued drinking and states that he just got out of rehab 3 to 4 months ago but relapsed after only 30 days.  When asked if he would attend rehab again he states \"I just got out.\"  Encouraged patient to avail himself to supportive services/CD support. He said it is very hard to quit and so easy to obtain alcohol.  He does not know if he will be able to quit drinking.     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  He is  with 2 children, whom he speaks to monthly.  He is closest to his daughter Rupinder.  He also has 2 brothers Jame and Arden.  Jame has cancer so is ill himself; Jim feels he is closest to Jame emotionally.  However brother Arden is listed as Jim's first alternate healthcare agent, which still reflects his current wishes.  His primary care physician Talon is listed as his primary healthcare agent.    Prognosis, Goals, & Planning:        Prognosis, Goals, and/or Advance Care Planning were addressed today: Yes      Functional Status just prior to hospitalization: 3 (Capable of only limited self-care; needs help with ADLs; in bed/chair >50% of waking hours)          Patient has decision-making capacity today for complex decisions: Yes      Patient's decision making preferences: independently          I have concerns about the patient/family's health literacy today: No           Coping, Meaning, & Spirituality:   Mood, coping, and/or meaning in the context of serious illness were addressed today: Yes  Summary/Comments: Jim states he is open to speaking with chaplains but does not endorse any particular [employer].    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    History of Present I kiel this is Keyanna O shoot for God I we have an appointment llness:   History gathered today from: patient, medical chart  Adopted from H&P:  Jim Richard is a 66 year old male with PMH significant for alcoholic liver cirrhosis complicated with esophageal varices, abdominal ascites, hepatorenal syndrome,  pancytopenia, c/o abdominal pain, anxiety and depression. His chronic alcoholism has led to  significant complications with decompensated liver cirrhosis and frequent hospitalizations- this is his 4th admission since January 2021. He now requires paracentesis every 2 weeks and has had elevating creatinine-  type 2 hepatorenal syndrome. Has pancytopenia due to bone marrow suppression. MELD score of 16.     Key Palliative Symptom Data:  We are not helping to manage these symptoms currently in this patient.    Patient is on opioids: assessed and bowels ok/no needed changes to plan of care today.    ROS:  Comprehensive ROS is reviewed and is negative except as here & per HPI: N/A     Past Medical History:  Past Medical History:   Diagnosis Date     Alcoholism (H) 1/14/2013    had treatment at [facility]      COPD (chronic obstructive pulmonary disease) (H)      Coronary artery disease 09/09/2014    Nuclear stress test with slight fixed defect inferiorly, no ischemia     Depression     w/anxiety     Esophageal varices with bleeding 04/28/2018    6 varices banded on EGD     Heart attack (H)      Hepatomegaly     Fatty liver, chronic alcoholic     Hyperlipemia      Hypertension      JANIS on CPAP      Peripheral vascular disease (H)      PVD (peripheral vascular disease) (H)      SDH (subdural hematoma) (H) 04/26/2018    Right side, resolved spontaneously     Spinal stenosis      Substance abuse (H)         Past Surgical History:  Past Surgical History:   Procedure Laterality Date     APPENDECTOMY       BYPASS GRAFT FEMOROPOPLITEAL  6/12/2012    Procedure: BYPASS GRAFT FEMOROPOPLITEAL;  LEFT FEMORAL TO ABOVE KNEE POPLITEAL BYPASS, ENDARTERECTOMY OF SFA AND PF ARTERIES, LEFT EXTERNAL ILIAC TO COMMON FEMORAL INTERPOSITION GRAFT;  Surgeon: Damir Roberts MD;  Location:  OR     COLONOSCOPY       COLONOSCOPY N/A 8/8/2019    Procedure: COLONOSCOPY;  Surgeon: Pavan Arguello MD;  Location:  GI      COLONOSCOPY N/A 3/10/2020    Procedure: COLONOSCOPY;  Surgeon: Rosendo Shaw MD;  Location:  GI     ENDARTERECTOMY FEMORAL  6/12/2012    Procedure: ENDARTERECTOMY FEMORAL;;  Surgeon: Damir Roberts MD;  Location:  OR     ESOPHAGOSCOPY, GASTROSCOPY, DUODENOSCOPY (EGD), COMBINED N/A 3/22/2018    Procedure: COMBINED ESOPHAGOSCOPY, GASTROSCOPY, DUODENOSCOPY (EGD);  ESOPHAGOSCOPY, GASTROSCOPY, DUODENOSOCPY.;  Surgeon: Valeri Pruitt MD;  Location:  OR     ESOPHAGOSCOPY, GASTROSCOPY, DUODENOSCOPY (EGD), COMBINED N/A 9/29/2018    Procedure: COMBINED ESOPHAGOSCOPY, GASTROSCOPY, DUODENOSCOPY (EGD);;  Surgeon: Rosendo Shaw MD;  Location:  GI     ESOPHAGOSCOPY, GASTROSCOPY, DUODENOSCOPY (EGD), COMBINED N/A 8/2/2019    Procedure: ESOPHAGOGASTRODUODENOSCOPY (EGD);  Surgeon: Pavan Arguello MD;  Location:  GI     ESOPHAGOSCOPY, GASTROSCOPY, DUODENOSCOPY (EGD), COMBINED N/A 9/25/2019    Procedure: ESOPHAGOGASTRODUODENOSCOPY (EGD);  Surgeon: Pavan Arguello MD;  Location: [facility]     ESOPHAGOSCOPY, GASTROSCOPY, DUODENOSCOPY (EGD), COMBINED N/A 3/8/2020    Procedure: ESOPHAGOGASTRODUODENOSCOPY (EGD);  Surgeon: Rosendo Shaw MD;  Location:  GI     ESOPHAGOSCOPY, GASTROSCOPY, DUODENOSCOPY (EGD), COMBINED N/A 6/11/2020    Procedure: ESOPHAGOGASTRODUODENOSCOPY (EGD);  Surgeon: Rosendo Shaw MD;  Location:  GI     ESOPHAGOSCOPY, GASTROSCOPY, DUODENOSCOPY (EGD), COMBINED N/A 1/23/2021    Procedure: ESOPHAGOGASTRODUODENOSCOPY (EGD);  Surgeon: Pavan Arguello MD;  Location:  GI     HERNIA REPAIR  2017    Abdominal     LAMINECTOMY, FUSION LUMBAR THREE+ LEVEL, COMBINED N/A 9/22/2014    Procedure: COMBINED LAMINECTOMY, FUSION LUMBAR THREE+ LEVEL;  Surgeon: Sebastien Pruitt MD;  Location: [facility] OR     TONSILLECTOMY & ADENOIDECTOMY           Family History:  Family History   Problem Relation Age of Onset     Mental Illness Son      Coronary Artery Disease Early Onset Mother       Substance Abuse Father      Lung Cancer Father      Substance Abuse Brother      Unknown/Adopted No family hx of      Depression No family hx of      Anxiety Disorder No family hx of      Schizophrenia No family hx of      Bipolar Disorder No family hx of      Suicide No family hx of      Dementia No family hx of      Hopkins Disease No family hx of      Parkinsonism No family hx of      Autism Spectrum Disorder No family hx of      Intellectual Disability (Mental Retardation) No family hx of          Allergies:  Allergies   Allergen Reactions     Amlodipine Swelling     Lisinopril      Other reaction(s): Angioedema  Mouth and tongue swelling   Mouth and tongue swelling           Medications:  I have reviewed this patient's medication profile and medications from this hospitalization.     Noted scheduled meds are:    cefTRIAXone  1 g Intravenous Q24H     folic acid  1 mg Oral Daily     gabapentin  300 mg Oral TID     midodrine  2.5 mg Oral TID     mirtazapine  30 mg Oral At Bedtime     multivitamin w/minerals  1 tablet Oral Daily     nicotine   Transdermal Q8H     pantoprazole  40 mg Oral BID     QUEtiapine  200 mg Oral At Bedtime     sodium chloride (PF)  3 mL Intracatheter Q8H     tamsulosin  0.8 mg Oral Daily     vitamin B1  100 mg Oral Daily     traZODone  100 mg Oral At Bedtime     vortioxetine  10 mg Oral BID       Noted PRN meds are:  acetaminophen, albuterol, glucose **OR** dextrose **OR** glucagon, eszopiclone, flumazenil, fluticasone-vilanterol, OLANZapine zydis **OR** haloperidol lactate, HYDROmorphone, hydrOXYzine, lidocaine 4%, lidocaine 4%, lidocaine 4%, lidocaine (buffered or not buffered), lidocaine (buffered or not buffered), lidocaine (buffered or not buffered), LORazepam **OR** LORazepam, - MEDICATION INSTRUCTIONS -, - MEDICATION INSTRUCTIONS -, melatonin, naloxone **OR** naloxone **OR** naloxone **OR** naloxone, nicotine, ondansetron **OR** ondansetron, oxyCODONE, polyethylene glycol,  QUEtiapine, sodium chloride (PF), sodium chloride (PF), sodium chloride (PF), sodium chloride (PF), sodium chloride (PF)    Physical Exam:  Vital Signs: Temp: 98.9  F (37.2  C) Temp src: Oral BP: 122/66 Pulse: 65   Resp: 16 SpO2: 98 % O2 Device: None (Room air)    Weight: 172 lbs 12.8 oz  CONSTITUTIONAL: NAD, A&Ox3. Calm and cooperative.  HEENT: NCAT, PERRLA, MMM  NECK: Supple  RESPIRATORY: NL respiratory effort on room air  GASTROINTESTINAL: rounded, firm, ascites  MUSCULOSKELETAL: No extremity edema. Equal strength in all major muscle groups. Moving freely in bed   SKIN: Warm and intact.   NEUROLOGIC: Appropriately responsive during interview  PSYCH: Affect normal    Data reviewed:  Recent imaging reviewed, my comments on pertinents:   Results for orders placed or performed during the hospital encounter of 06/06/21   US Paracentesis    Impression    IMPRESSION:  1.  Status post ultrasound-guided paracentesis.    ANIL KERN MD       Lab Results   Component Value Date    WBC 5.1 06/09/2021    WBC 3.7 (L) 06/07/2021    WBC 3.6 (L) 06/06/2021    HGB 8.1 (L) 06/10/2021    HGB 7.2 (L) 06/09/2021    HGB 7.2 (L) 06/08/2021    HCT 22.9 (L) 06/09/2021    HCT 24.0 (L) 06/07/2021    HCT 22.6 (L) 06/06/2021    PLT 95 (L) 06/09/2021     (L) 06/07/2021     (L) 06/06/2021     (L) 06/10/2021     06/09/2021     06/08/2021    POTASSIUM 4.2 06/10/2021    POTASSIUM 4.0 06/09/2021    POTASSIUM 4.1 06/08/2021    CHLORIDE 104 06/10/2021    CHLORIDE 106 06/09/2021    CHLORIDE 107 06/08/2021    CO2 20 06/10/2021    CO2 22 06/09/2021    CO2 21 06/08/2021    BUN 28 06/10/2021    BUN 26 06/09/2021    BUN 28 06/08/2021    CR 2.16 (H) 06/10/2021    CR 2.20 (H) 06/09/2021    CR 1.97 (H) 06/08/2021     (H) 06/10/2021     (H) 06/09/2021    GLC 98 06/08/2021    NTBNPI 2,831 (H) 08/11/2019    NTBNPI 132 07/03/2019    TROPI 0.015 03/10/2021    TROPI <0.015 03/10/2021    TROPI <0.015 02/09/2021    AST  30 06/10/2021    AST 18 06/09/2021    AST 18 06/08/2021    ALT 17 06/10/2021    ALT 13 06/09/2021    ALT 13 06/08/2021     (H) 06/21/2014    ALKPHOS 156 (H) 06/10/2021    ALKPHOS 144 06/09/2021    ALKPHOS 132 06/08/2021    BILITOTAL 0.6 06/10/2021    BILITOTAL 0.4 06/09/2021    BILITOTAL 0.5 06/08/2021    ELPIDIO 46 06/09/2021    ELPIDIO 32 10/05/2020    ELPIDIO 44 06/12/2020    INR 1.31 (H) 06/06/2021    INR 1.20 (H) 05/01/2021    INR 1.24 (H) 03/26/2021      Lab Results   Component Value Date    ALBUMIN 3.1 06/10/2021

## 2021-06-10 NOTE — PROGRESS NOTES
[facility]  Gastroenterology Progress Note     Jim Richard MRN# [id number]   YOB: 1955 Age: 66 year old          Assessment and Plan:   Jim Richard is a pleasant 66 year old male with alcoholic liver cirrhosis complicated with esophageal varices, abdominal ascites, hepatorenal syndrome, pancytopenia and c/o abdominal pain. GI consulted on 6/7/21.     Active Problems:  Alcoholic liver cirrhosis  Elevated Creatinine- hepatorenal syndrome  Abdominal pain  Abdominal ascites- concern for SBP  Hypoalbuminemia  Pancytopenia  Patient has suffered from chronic alcoholism and developed significant complications with decompensated liver cirrhosis. He has required paracentesis every 2 weeks. Has had elevating creatinine- currently 2.2 (05/21 1.58) He has type 2 hepatorenal syndrome. Has pancytopenia due to bone marrow suppression.  Albumin 2.3  Platelets 109  Hemoglobin 7.2 and stable with negative occult blood  MELD score 16  Has had paracentesis on 6/7 and 6/9 needs ordered today.     -- Continue midodrine and pantoprazole  -- Albumin started- will need to discontinue once albumin 3.5 or higher  -- Consider TIPS procedure-possibly this hospitalization  -- Monitor CBC and BMP  -- Regular diet and encourage good nutrition  -- Patient has ongoign abdominal distention- recommend therapeutic paracentesis               Interval History:   denies chest pain, denies shortness of breath, has had a bowel movement in the last 24 hours, doing well; no cp, sob, n/v/d, or abd pain. and has pain near ribs and very distended abdoemn              Review of Systems:   C: NEGATIVE for fever, chills, change in weight  E/M: NEGATIVE for ear, mouth and throat problems  R: NEGATIVE for significant cough or SOB  CV: NEGATIVE for chest pain, palpitations or peripheral edema             Medications:   I have reviewed this patient's current medications    albumin human  25 g Intravenous Q8H      cefTRIAXone  1 g Intravenous Q24H     folic acid  1 mg Oral Daily     gabapentin  300 mg Oral TID     midodrine  2.5 mg Oral TID     mirtazapine  30 mg Oral At Bedtime     multivitamin w/minerals  1 tablet Oral Daily     nicotine   Transdermal Q8H     pantoprazole  40 mg Oral BID     QUEtiapine  200 mg Oral At Bedtime     sodium chloride (PF)  3 mL Intracatheter Q8H     tamsulosin  0.8 mg Oral Daily     vitamin B1  100 mg Oral Daily     traZODone  100 mg Oral At Bedtime     vortioxetine  10 mg Oral BID                  Physical Exam:   Vitals were reviewed  Vital Signs with Ranges  Temp:  [97.6  F (36.4  C)-98.9  F (37.2  C)] 98.9  F (37.2  C)  Pulse:  [65-82] 82  Resp:  [16] 16  BP: (114-141)/(62-78) 137/68  SpO2:  [96 %-99 %] 96 %  I/O last 3 completed shifts:  In: 560 [P.O.:240; I.V.:3]  Out: 1950 [Urine:1950]  Constitutional: healthy, alert and no distress   Cardiovascular: negative, PMI normal. No lifts, heaves, or thrills. RRR. No murmurs, clicks gallops or rub  Respiratory: negative, Percussion normal. Good diaphragmatic excursion. Lungs clear  Abdomen: Abdomen firm tender. BS normal. No masses, organomegaly             Data:   I reviewed the patient's new clinical lab test results.   Recent Labs   Lab Test 06/09/21  1028 06/08/21  0824 06/07/21  0839 06/06/21  0600 05/01/21  1231 03/26/21  2131 03/26/21  2131   WBC 5.1  --  3.7* 3.6* 7.6  --  4.8   HGB 7.2* 7.2* 7.8* 7.4* 8.0*  --  8.3*   MCV 98  --  100 100 94  --  97   PLT 95*  --  109* 111* 208   < > 162   INR  --   --   --  1.31* 1.20*  --  1.24*    < > = values in this interval not displayed.     Recent Labs   Lab Test 06/09/21  1028 06/08/21  0824 06/07/21  1848   POTASSIUM 4.0 4.1 4.2   CHLORIDE 106 107 105   CO2 22 21 21   BUN 26 28 29   ANIONGAP 5 5 5     Recent Labs   Lab Test 06/09/21  1028 06/08/21  0824 06/06/21  1023 06/06/21  0600 03/26/21  2131 03/13/21  0946 03/13/21  0946 03/11/21  0636 03/10/21  2252 10/06/20  1610 10/06/20  1610   ALBUMIN  2.3* 2.3*  --  2.2* 2.7*   < > 2.9*  --  3.1*   < >  --    BILITOTAL 0.4 0.5  --  0.3 2.2*   < > 1.3  --  1.8*   < >  --    ALT 13 13  --  15 26   < > 37  --  39   < >  --    AST 18 18  --  31 36   < > 71*  --  89*   < >  --    PROTEIN  --   --  20*  --   --   --   --  70*  --   --  10*   LIPASE  --   --   --   --  444*  --  490*  --  703*   < >  --     < > = values in this interval not displayed.       I reviewed the patient's new imaging results.    All laboratory data reviewed  All imaging studies reviewed by me.    Gloria De Leon PA-C,  6/10/2021  [facility]  Office : [phone]  Cell: [phone] (Dr. Shaw)  Cell: [phone] (Gloria De Leon PA-C)

## 2021-06-10 NOTE — PROGRESS NOTES
[facility]    HOSPITALIST PROGRESS NOTE :   --------------------------------------------------    Date of Admission:  6/6/2021    Cumulative Summary: Jim Richard is a 66 year old male with past medical history significant for persistent alcohol abuse, alcoholic cirrhosis, recurrent ascites requiring weekly to biweekly paracentesis, COPD, CAD, esophageal varices, essential hypertension, peripheral vascular disease, pancytopenia, chronic kidney disease or chronic thrombocytopenia and anemia presented to the emergency room on June 5th due to the concern for new and acute abdominal pain with distention associated with ascites.  Patient admitted drinking a pint of vodka the night before.    Assessment & Plan     Acute generalized abdominal pain: Concern for possible spontaneous bacterial peritonitis.  Acute renal failure on top of chronic kidney disease; low urine output, patient does not seem to be on any diuretics at home, denying following up with gastroenterology as an outpatient, at this time there is also concern for development of hepatorenal syndrome.  Recurrent large-volume ascites due to alcoholic liver disease: According to patient, he is requiring paracentesis every 2 weeks and usually requires anywhere from 5 to 7 L of fluid removal.  According to patient, he underwent fluid removal in the emergency department where couple of liters of fluid was removed.  This morning patient remains significantly uncomfortable with distended abdomen, denying any similar pain in the past with the presence of ascites only.  Initial fluid analysis showing only 10 WBC count does not seem to be concerning for SBP.  06/07/21:Status post paracentesis , 5500 ml yellow fluid was removed , in addition to couple of liters removed in ED  Alcoholic liver cirrhosis; unfortunately patient continues to consume alcohol and admits consuming 1 pint of vodka the night before coming to the hospital.  Discussed  with him regarding my concern for significantly associated morbidity and mortality with ongoing alcohol consumption  End-stage liver disease  Pancytopenia associated with liver cirrhosis  Acute on chronic anemia: Patient is denying any signs and symptoms of bleeding, hemoglobin was down to 7.6.    -- Will place on 2000 ml fluid restriction  -- not ready for hospice but would like to meet palliative to discuss about goals of care due to advanced liver disease   -- Gastro enterology has been mentioning about tips procedure, need for further clear plan if they are planning to do this during this hospitalization   -- patient is feeling slightly better after receiving albumin and paracentesis yesterday  --I discussed with him that at this time patient does not have significantly elevated liver function test which shows that patient does not seem to be in acute alcoholic hepatitis but this seems to be more like his baseline where he is requiring paracentesis almost on every other day.  --Globin results were reviewed which is improved after 1 unit of packed red blood cell transfusion.  --Patient will be getting ceftriaxone for 7 days.  --Remove Leger catheter today  -- Ammonia is ordered by GI, so far patient does not have any signs and symptoms of encephalopathy at this point   -- check CMP tomorrow   - Patient does not seem to be on any home diuretics, and probably will not be able to get started on any diuretics at this time due to renal functions.  -- Follow-up on fluid cultures from paracentesis from yesterday.  -- We will check fecal occult blood test due to drop in hemoglobin, came back negative   -- Continue Nexium   -- Continue patient on Midodrine 2.5 mg p.o. 3 times daily  -- Might need to follow-up with hepatologist as an outpatient.    Acute renal failure on top of CKD stage III-IV: His creatinine at baseline seems to be close to 1.6, now creatinine seems to be persistently close to 2 despite multiple  paracentesis, albumin transfusion.  Type 2 hepatorenal syndrome:      -- Treatment as above for hepatorenal syndrome   --Jiang catheter has been removed.  --Avoid nephrotoxic agents.  --Start patient on 2000 mL free water restriction.    Right Umbilical hernia :  -- Have painful right lower abdominal hernia , not able to be reduced , patient wants to know if any thing can be done about it , discussed with him that at this point I don't think surgery will proceed with any procedure right away due to co morbidities and concern for type 2 hepatorenal syndrome   -- Patient was seen by surgery , high risk for complications , not a good candidate to go for surgery, no signs for hernia incarceration     Acute urinary retention  History of benign prostate hyperplasia  --Patient was retaining more than 800 mL ,also have acute renal failure, was unable to pass any urine, Jiang catheter was placed   --Strict intake and output.  --Continue patient on Flomax 0.8 mg p.o. daily.  --  Remoive jiang now     History of toxic encephalopathy from alcohol intoxication: Currently no signs and symptoms of encephalopathy this morning, seems to be able to answer all the questions.  Patient remains at very high risk for withdrawal though no signs seen thus far.    -- Continue CIWA with Ativan, so far no signs of alcohol withdrawl  -- multivitamin.  -- Continue patient on thiamine and folic acid.  -- Patient will not be started at this time on any clonidine due to hypotension  --We will continue patient on PTA dose of gabapentin 300 mg p.o. 3 times a day but will not give patient high dose of tapering Neurontin at this point.  -- GI is checking ammonia as above, patient does not have currently any signs and symptoms for encephalopathy     Essential hypertension  -- Patient does not seem to be taking any antihypertensive medication, continue to monitor.    COPD  -- Continue PTA Breo Ellipta 200/25 1 puff daily as needed.    Obstructive sleep  apnea  --Continue CPAP    Chronic depression and anxiety  --Continue PTA Vortioxetine ,mirtazapine and trazodone and Seroquel    Diet: Regular Diet Adult    Leger Catheter: in place, indication: Retention    DVT Prophylaxis: Pneumatic Compression Devices  Code Status: No CPR- Do NOT Intubate    The patient's care was discussed with the Bedside Nurse, Care Coordinator/ and Patient.    Disposition Plan   Expected discharge: Tentative discharge tomorrow to rehab if GI is not planning any further intervention with TIPS procedure or if they would like to monitor him further in the hospital for clinical improvement.  Patient seems to be reaching baseline but will require frequent paracentesis as an outpatient, will also recommend hepatologist follow-up as an outpatient.  Palliative consultation was also placed for introduction, patient is not ready at this time for hospice care but understands that he has a severe advanced liver disease. More than 35 min of the time was spend in care today, more than 50% of the time was spent in patient care coordination and counseling.      Tiff Rodriguez MD, FACP  Text Page (7am - 6pm)    ----------------------------------------------------------------------------------------------------------------------    Interval History    Patient was seen and examined, sitting in chair, looks slightly better, his lightheadedness has improved significantly with blood transfusion, he continues to feel tired and fatigued, his abdominal distention is slightly better, 4 L of fluid was again removed yesterday, I did discuss with patient regarding his advanced liver disease and how frequent he is requiring the paracentesis as he required 2 L of fluid removal on Sunday, 5 L on Monday and then again 4 L of fluid removal on Wednesday.  We also discussed about restarting patient on fluid restriction.  I also try to approach him regarding seriousness of his advanced liver disease and if he would  like to meet with palliative care team.  He is not ready to go down the hospice path but is agreeable to meet with hospice team and see if they can be a support to him and if he has any questions for them regarding his advanced disease.    -Data reviewed today: I reviewed all new labs and imaging results over the last 24 hours.    I personally reviewed no images or EKG's today.    Physical Exam   Temp: 98.9  F (37.2  C) Temp src: Oral BP: 122/66 Pulse: 65   Resp: 16 SpO2: 98 % O2 Device: None (Room air)    Vitals:    06/07/21 1801 06/08/21 0700 06/10/21 0537   Weight: 74.7 kg (164 lb 11.2 oz) 75.2 kg (165 lb 11.2 oz) 78.4 kg (172 lb 12.8 oz)     Vital Signs with Ranges  Temp:  [97.6  F (36.4  C)-98.9  F (37.2  C)] 98.9  F (37.2  C)  Pulse:  [65-82] 65  Resp:  [16] 16  BP: (114-141)/(55-78) 122/66  SpO2:  [96 %-99 %] 98 %  I/O last 3 completed shifts:  In: 560 [P.O.:240; I.V.:3]  Out: 1950 [Urine:1950]    GENERAL: Alert , awake and oriented. NAD. Conversational, appropriate.  Looks chronically sick  HEENT: Normocephalic. EOMI. No icterus or injection. Nares normal.   LUNGS: Clear to auscultation with decreased air entry in the bases, no wheezing, no crackles  HEART: Regular rate. Extremities perfused.   ABDOMEN: Firm and more distended , worsened as compared to yesterday ,generalized tenderness no guarding or rigidity, no rebound tenderness at this point.  EXTREMITIES: No LE edema noted.   NEUROLOGIC: Moves extremities x4 on command. No acute focal neurologic abnormalities noted.     Medications     - MEDICATION INSTRUCTIONS -       - MEDICATION INSTRUCTIONS -         cefTRIAXone  1 g Intravenous Q24H     folic acid  1 mg Oral Daily     gabapentin  300 mg Oral TID     midodrine  2.5 mg Oral TID     mirtazapine  30 mg Oral At Bedtime     multivitamin w/minerals  1 tablet Oral Daily     nicotine   Transdermal Q8H     pantoprazole  40 mg Oral BID     QUEtiapine  200 mg Oral At Bedtime     sodium chloride (PF)  3 mL  Intracatheter Q8H     tamsulosin  0.8 mg Oral Daily     vitamin B1  100 mg Oral Daily     traZODone  100 mg Oral At Bedtime     vortioxetine  10 mg Oral BID       Data   Recent Labs   Lab 06/10/21  1115 06/09/21  1028 06/08/21  0824 06/07/21  0839 06/07/21  0839 06/06/21  0600   WBC  --  5.1  --   --  3.7* 3.6*   HGB 8.1* 7.2* 7.2*  --  7.8* 7.4*   MCV  --  98  --   --  100 100   PLT  --  95*  --   --  109* 111*   INR  --   --   --   --   --  1.31*   * 133 133   < > 136 141   POTASSIUM 4.2 4.0 4.1   < > 3.9 4.0   CHLORIDE 104 106 107   < > 109 114*   CO2 20 22 21   < > 20 19*   BUN 28 26 28   < > 29 32*   CR 2.16* 2.20* 1.97*   < > 1.91* 1.98*   ANIONGAP 7 5 5   < > 7 8   KEV 8.0* 7.7* 7.6*   < > 8.1* 7.6*   * 133* 98   < > 77 81   ALBUMIN 3.1* 2.3* 2.3*  --   --  2.2*   PROTTOTAL 6.3* 5.5* 5.4*  --   --  5.8*   BILITOTAL 0.6 0.4 0.5  --   --  0.3   ALKPHOS 156* 144 132  --   --  160*   ALT 17 13 13  --   --  15   AST 30 18 18  --   --  31    < > = values in this interval not displayed.       Imaging:   Recent Results (from the past 24 hour(s))   US Paracentesis    Narrative    US PARACENTESIS 6/9/2021 2:16 PM    CLINICAL HISTORY: HIGH VOLUME paracentesis with or without diagnostic  fluid analysis with labs to be drawn if ordered. Total paracentesis  volume as much as possible.    PROCEDURE: Informed consent obtained. Time out performed. The abdomen  was prepped and draped in a sterile fashion. 10 mL of 1% lidocaine was  infused into local soft tissues. A 5 Italian catheter system was  introduced into the abdominal ascites under ultrasound guidance.    4 liters of clear fluid were removed and sent to lab if requested.    Patient tolerated procedure well.    Ultrasound imaging was obtained and placed in the patient's permanent  medical record.      Impression    IMPRESSION:  1.  Status post ultrasound-guided paracentesis.    ANIL KERN MD

## 2021-06-10 NOTE — PLAN OF CARE
DATE & TIME: 6/9/21 1900-0730      Cognitive Concerns/ Orientation : A&O x 4    BEHAVIOR & AGGRESSION TOOL COLOR: green  CIWA SCORE: 2,1, 1 for slight tremor     ABNL VS/O2: VSS on RA  MOBILITY: Ax1 GB/walker, up in chair for meals.  PAIN MANAGMENT: c/o abdominal pain with movement controlled w/ prn oxy x1 and prn dilaudid x1.  DIET: Regular- encouraged PO  BOWEL/BLADDER: Leger patent with good UOP  ABNL LAB/BG: Cr. 2.20, hgb 7.2, Albumin 2.3  DRAIN/DEVICES: PIV Saline Locked, intermittent Abx and albumin   TELEMETRY RHYTHM: NA  TESTS/PROCEDURES: Paracentesis today 6/9/21 4L off     D/C DATE: Received 1 unit RBC previous shift. GI following.

## 2021-06-10 NOTE — PROGRESS NOTES
Care Suites Post Procedure Summary (without sedation)     Immediately prior to starting the procedure a Time Out was conducted with procedural staff and re-confirmed the patient s name, procedure, and site/side.      Consent obtained from patient after discussing the risks, benefits and alternatives.      Procedure: paracentesis    Procedure Interventions:    Fluid (cc) removed: Yes. Removed 2500 cc of yellow colored fluid from right abdomen.    Tube/Drain placed: NA.    Patient tolerance: Patient tolerated the procedure well with no immediate complications. Site dressed by tech with band aid. No bleeding or drainage at completion.    Post-procedure report given to: Primary RN and pt transferred to station 66 by transport.    (See Doc Flow-sheets and MAR for additional information)

## 2021-06-10 NOTE — PLAN OF CARE
Patient A&0x4. Up with SBA GB and walker. His Abdomin is distended but soft. He c/o bilateral  upper abdominal pain with movement. No edema noted. Lungs clear. On RA. He had a paracentesis today with 2500cc taken off the R side. 10mg of oxy given PRN. Regular diet.  DNR/I

## 2021-06-11 ENCOUNTER — APPOINTMENT (OUTPATIENT)
Dept: OCCUPATIONAL THERAPY | Facility: CLINIC | Age: 66
DRG: 441 | End: 2021-06-11
Payer: MEDICARE

## 2021-06-11 LAB
ALBUMIN SERPL-MCNC: 2.9 G/DL (ref 3.4–5)
ALP SERPL-CCNC: 166 U/L (ref 40–150)
ALT SERPL W P-5'-P-CCNC: 18 U/L (ref 0–70)
ANION GAP SERPL CALCULATED.3IONS-SCNC: 9 MMOL/L (ref 3–14)
AST SERPL W P-5'-P-CCNC: 30 U/L (ref 0–45)
BACTERIA SPEC CULT: NO GROWTH
BILIRUB SERPL-MCNC: 0.6 MG/DL (ref 0.2–1.3)
BUN SERPL-MCNC: 31 MG/DL (ref 7–30)
CALCIUM SERPL-MCNC: 8.1 MG/DL (ref 8.5–10.1)
CHLORIDE SERPL-SCNC: 101 MMOL/L (ref 94–109)
CO2 SERPL-SCNC: 17 MMOL/L (ref 20–32)
CREAT SERPL-MCNC: 2.15 MG/DL (ref 0.66–1.25)
GFR SERPL CREATININE-BSD FRML MDRD: 31 ML/MIN/{1.73_M2}
GLUCOSE SERPL-MCNC: 121 MG/DL (ref 70–99)
POTASSIUM SERPL-SCNC: 4.3 MMOL/L (ref 3.4–5.3)
PROT SERPL-MCNC: 6.1 G/DL (ref 6.8–8.8)
SODIUM SERPL-SCNC: 127 MMOL/L (ref 133–144)
SPECIMEN SOURCE: NORMAL

## 2021-06-11 PROCEDURE — 99233 SBSQ HOSP IP/OBS HIGH 50: CPT | Performed by: INTERNAL MEDICINE

## 2021-06-11 PROCEDURE — 250N000011 HC RX IP 250 OP 636: Performed by: INTERNAL MEDICINE

## 2021-06-11 PROCEDURE — 80053 COMPREHEN METABOLIC PANEL: CPT | Performed by: INTERNAL MEDICINE

## 2021-06-11 PROCEDURE — 97110 THERAPEUTIC EXERCISES: CPT | Mod: GO

## 2021-06-11 PROCEDURE — 250N000013 HC RX MED GY IP 250 OP 250 PS 637: Performed by: INTERNAL MEDICINE

## 2021-06-11 PROCEDURE — 99207 PR CDG-MDM COMPONENT: MEETS MODERATE - UP CODED: CPT | Performed by: INTERNAL MEDICINE

## 2021-06-11 PROCEDURE — 36415 COLL VENOUS BLD VENIPUNCTURE: CPT | Performed by: INTERNAL MEDICINE

## 2021-06-11 PROCEDURE — 120N000001 HC R&B MED SURG/OB

## 2021-06-11 RX ADMIN — MIRTAZAPINE 30 MG: 15 TABLET, FILM COATED ORAL at 22:00

## 2021-06-11 RX ADMIN — QUETIAPINE FUMARATE 50 MG: 25 TABLET ORAL at 16:00

## 2021-06-11 RX ADMIN — CARBIDOPA AND LEVODOPA 2.5 MG: 50; 200 TABLET, EXTENDED RELEASE ORAL at 13:23

## 2021-06-11 RX ADMIN — MULTIPLE VITAMINS W/ MINERALS TAB 1 TABLET: TAB at 09:22

## 2021-06-11 RX ADMIN — OXYCODONE HYDROCHLORIDE 10 MG: 5 TABLET ORAL at 16:52

## 2021-06-11 RX ADMIN — TRAZODONE HYDROCHLORIDE 100 MG: 100 TABLET ORAL at 22:00

## 2021-06-11 RX ADMIN — VORTIOXETINE 10 MG: 10 TABLET, FILM COATED ORAL at 09:22

## 2021-06-11 RX ADMIN — TAMSULOSIN HYDROCHLORIDE 0.8 MG: 0.4 CAPSULE ORAL at 09:22

## 2021-06-11 RX ADMIN — OXYCODONE HYDROCHLORIDE 10 MG: 5 TABLET ORAL at 03:25

## 2021-06-11 RX ADMIN — PANTOPRAZOLE SODIUM 40 MG: 40 TABLET, DELAYED RELEASE ORAL at 22:00

## 2021-06-11 RX ADMIN — QUETIAPINE FUMARATE 200 MG: 100 TABLET ORAL at 22:01

## 2021-06-11 RX ADMIN — CEFTRIAXONE SODIUM 1 G: 1 INJECTION, POWDER, FOR SOLUTION INTRAMUSCULAR; INTRAVENOUS at 11:17

## 2021-06-11 RX ADMIN — CARBIDOPA AND LEVODOPA 2.5 MG: 50; 200 TABLET, EXTENDED RELEASE ORAL at 09:22

## 2021-06-11 RX ADMIN — THIAMINE HCL TAB 100 MG 100 MG: 100 TAB at 09:22

## 2021-06-11 RX ADMIN — CARBIDOPA AND LEVODOPA 2.5 MG: 50; 200 TABLET, EXTENDED RELEASE ORAL at 18:23

## 2021-06-11 RX ADMIN — PANTOPRAZOLE SODIUM 40 MG: 40 TABLET, DELAYED RELEASE ORAL at 09:22

## 2021-06-11 RX ADMIN — GABAPENTIN 300 MG: 300 CAPSULE ORAL at 15:59

## 2021-06-11 RX ADMIN — GABAPENTIN 300 MG: 300 CAPSULE ORAL at 22:00

## 2021-06-11 RX ADMIN — GABAPENTIN 300 MG: 300 CAPSULE ORAL at 09:22

## 2021-06-11 RX ADMIN — NICOTINE 1 PATCH: 21 PATCH, EXTENDED RELEASE TRANSDERMAL at 09:27

## 2021-06-11 RX ADMIN — VORTIOXETINE 10 MG: 10 TABLET, FILM COATED ORAL at 22:00

## 2021-06-11 RX ADMIN — FOLIC ACID 1 MG: 1 TABLET ORAL at 09:22

## 2021-06-11 ASSESSMENT — ACTIVITIES OF DAILY LIVING (ADL)
ADLS_ACUITY_SCORE: 14
ADLS_ACUITY_SCORE: 14
ADLS_ACUITY_SCORE: 13
ADLS_ACUITY_SCORE: 14

## 2021-06-11 NOTE — PROGRESS NOTES
[facility]  Gastroenterology Progress Note     Jim Richard MRN# [id number]   YOB: 1955 Age: 66 year old          Assessment and Plan:   Jim Richard is a pleasant 66 year old male with alcoholic liver cirrhosis complicated with esophageal varices, abdominal ascites, hepatorenal syndrome, pancytopenia and c/o abdominal pain. GI consulted on 6/7/21.     Active Problems:  Alcoholic liver cirrhosis  Elevated Creatinine- hepatorenal syndrome  Abdominal pain  Abdominal ascites- concern for SBP  Hypoalbuminemia  Pancytopenia  Patient has suffered from chronic alcoholism and developed significant complications with decompensated liver cirrhosis. He has required paracentesis every 2 weeks. Has had elevating creatinine- currently 2.16 (05/21 1.58) He has type 2 hepatorenal syndrome. Has pancytopenia due to bone marrow suppression.  Albumin 3.1 (pending results today)  Platelets 109  Hemoglobin 8.1 and stable with negative occult blood  MELD score 16  Has had paracentesis q daily or every other day.  Discussed TIPS procedure and patient aware of risks and benefits.  Febrile with Tmax at 101.3     -- Continue midodrine and pantoprazole  -- On Albumin will need to discontinue once albumin 3.5 or higher  -- Will need to consult IR for TIPS procedure on Monday  -- Monitor CBC and cmp  -- Regular diet and encourage good nutrition  -- Patient has ongoign abdominal distention  -- Continue on ceftriaxone               Interval History:   no new complaints, doing well, denies chest pain, denies shortness of breath, alert, oriented to person, place and time and doing well; no cp, sob, n/v/d, or abd pain. Febrile at 101.3              Review of Systems:   C: NEGATIVE for fever, chills, change in weight  E/M: NEGATIVE for ear, mouth and throat problems  R: NEGATIVE for significant cough or SOB  CV: NEGATIVE for chest pain, palpitations or peripheral edema             Medications:   I have  reviewed this patient's current medications    cefTRIAXone  1 g Intravenous Q24H     folic acid  1 mg Oral Daily     gabapentin  300 mg Oral TID     midodrine  2.5 mg Oral TID     mirtazapine  30 mg Oral At Bedtime     multivitamin w/minerals  1 tablet Oral Daily     nicotine   Transdermal Q8H     pantoprazole  40 mg Oral BID     QUEtiapine  200 mg Oral At Bedtime     sodium chloride (PF)  3 mL Intracatheter Q8H     tamsulosin  0.8 mg Oral Daily     vitamin B1  100 mg Oral Daily     traZODone  100 mg Oral At Bedtime     vortioxetine  10 mg Oral BID                  Physical Exam:   Vitals were reviewed  Vital Signs with Ranges  Temp:  [98.6  F (37  C)-101.3  F (38.5  C)] 99.4  F (37.4  C)  Pulse:  [65-89] 87  Resp:  [16] 16  BP: (104-134)/(55-66) 104/61  SpO2:  [95 %-98 %] 97 %  I/O last 3 completed shifts:  In: 1920 [P.O.:1920]  Out: 2325 [Urine:2325]  Constitutional: healthy, alert and no distress   Cardiovascular: negative, PMI normal. No lifts, heaves, or thrills. RRR. No murmurs, clicks gallops or rub  Respiratory: negative, Percussion normal. Good diaphragmatic excursion. Lungs clear  Abdomen: Abdomen soft, tender. BS normal. No masses, organomegaly           Data:   I reviewed the patient's new clinical lab test results.   Recent Labs   Lab Test 06/10/21  1115 06/09/21  1028 06/08/21  0824 06/07/21  0839 06/06/21  0600 05/01/21  1231 03/26/21  2131 03/26/21  2131   WBC  --  5.1  --  3.7* 3.6* 7.6  --  4.8   HGB 8.1* 7.2* 7.2* 7.8* 7.4* 8.0*  --  8.3*   MCV  --  98  --  100 100 94  --  97   PLT  --  95*  --  109* 111* 208   < > 162   INR  --   --   --   --  1.31* 1.20*  --  1.24*    < > = values in this interval not displayed.     Recent Labs   Lab Test 06/10/21  1115 06/09/21  1028 06/08/21  0824   POTASSIUM 4.2 4.0 4.1   CHLORIDE 104 106 107   CO2 20 22 21   BUN 28 26 28   ANIONGAP 7 5 5     Recent Labs   Lab Test 06/10/21  1115 06/09/21  1028 06/08/21  0824 06/06/21  1023 03/26/21  2131 03/26/21  2131  03/13/21  0946 03/13/21  0946 03/11/21  0636 03/10/21  2252 10/06/20  1610 10/06/20  1610   ALBUMIN 3.1* 2.3* 2.3*  --    < > 2.7*   < > 2.9*  --  3.1*   < >  --    BILITOTAL 0.6 0.4 0.5  --    < > 2.2*   < > 1.3  --  1.8*   < >  --    ALT 17 13 13  --    < > 26   < > 37  --  39   < >  --    AST 30 18 18  --    < > 36   < > 71*  --  89*   < >  --    PROTEIN  --   --   --  20*  --   --   --   --  70*  --   --  10*   LIPASE  --   --   --   --   --  444*  --  490*  --  703*   < >  --     < > = values in this interval not displayed.       I reviewed the patient's new imaging results.    All laboratory data reviewed  All imaging studies reviewed by me.    Gloria De Leon PA-C,  6/11/2021  [facility]  Office : [phone]  Cell: [phone] (Dr. Shaw)  Cell: [phone] (Gloria De Leon PA-C)

## 2021-06-11 NOTE — PLAN OF CARE
DATE & TIME: 6/10/2021 1204-3139      Cognitive Concerns/ Orientation : A&Ox4    BEHAVIOR & AGGRESSION TOOL COLOR: green  CIWA SCORE: 0,0  ABNL VS/O2: VSS on RA  MOBILITY: Ax1 GB/walker, up to chair for meals.  PAIN MANAGMENT: prn oxycodone x2 & IV dilaudid x1 for flank/abd pain  DIET: Tolerating reg diet; good oral intake. 2 L fluid restriction  BOWEL/BLADDER: Leger patent with good UOP  ABNL LAB/BG: Cr. 2.16, hgb 8.1, Albumin 3.1, Na 131  DRAIN/DEVICES: R hand PIV SL w/ intermittent abx  Skin: Scattered bruising/scabbing to BUE & BLE. Skin tear to R elbow w/ mepilex in place. Bandaid to R abd paracentesis site  TELEMETRY RHYTHM: NA  TESTS/PROCEDURES: Paracentesis today 6/10/2021 w/ 2.5 L off     D/C DATE: PT/OT, SW/CM, GI following.

## 2021-06-11 NOTE — PLAN OF CARE
DATE & TIME: 6/11/21 6574-7166   Cognitive Concerns/ Orientation : A&Ox4, forgetful at times.   BEHAVIOR & AGGRESSION TOOL COLOR: Green  CIWA SCORE: 2, 2     ABNL VS/O2: Temp 101.3, recheck 99.4 without intervention, MD aware. Other VSS on O2 97% RA  MOBILITY: Ax1 GB/walker, up in chair for meals.  PAIN MANAGMENT: C/o intermittent Rt sided abd pain.   DIET: Regular diet, Fluid restriction 2000ml  BOWEL/BLADDER: Leger patent with good UOP  ABNL LAB/BG: Cr 2.15 (2.16 yesterday), Albumin 2.9 (3.1 yesterday), Na 127 (131 yesterday),  DRAIN/DEVICES: PIV access x1 saline locked  TELEMETRY RHYTHM: NA  SKIN: Scattered scabbing/bruising, band aid paracentesis site on Rt side abd, CDI. Small open wound on Rt elbow, dressing changed, CDI.   TESTS/PROCEDURES: Possible TIPS procedure on Monday.    D/C DATE: TBD  OTHER IMPORTANT INFORMATION: MD aware of low grade fever, already on IV Rocephin q24h. Nicotine patch on Rt arm. GI following.

## 2021-06-11 NOTE — PROGRESS NOTES
[facility]    HOSPITALIST PROGRESS NOTE :   --------------------------------------------------    Date of Admission:  6/6/2021    Cumulative Summary: Jim Richard is a 66 year old male with past medical history significant for persistent alcohol abuse, alcoholic cirrhosis, recurrent ascites requiring weekly to biweekly paracentesis, COPD, CAD, esophageal varices, essential hypertension, peripheral vascular disease, pancytopenia, chronic kidney disease or chronic thrombocytopenia and anemia presented to the emergency room on June 5th due to the concern for new and acute abdominal pain with distention associated with ascites.  Patient admitted drinking a pint of vodka the night before.    Assessment & Plan     Acute generalized abdominal pain: Concern for possible spontaneous bacterial peritonitis.  Acute renal failure on top of chronic kidney disease; low urine output, patient does not seem to be on any diuretics at home, denying following up with gastroenterology as an outpatient, at this time there is also concern for development of hepatorenal syndrome.  Recurrent large-volume ascites due to alcoholic liver disease: According to patient, he is requiring paracentesis every 2 weeks and usually requires anywhere from 5 to 7 L of fluid removal.  According to patient, he underwent fluid removal in the emergency department where couple of liters of fluid was removed.  This morning patient remains significantly uncomfortable with distended abdomen, denying any similar pain in the past with the presence of ascites only.  Initial fluid analysis showing only 10 WBC count does not seem to be concerning for SBP.  06/07/21:Status post paracentesis , 5500 ml yellow fluid was removed , in addition to couple of liters removed in ED  Alcoholic liver cirrhosis; unfortunately patient continues to consume alcohol and admits consuming 1 pint of vodka the night before coming to the hospital.  Discussed  with him regarding my concern for significantly associated morbidity and mortality with ongoing alcohol consumption  End-stage liver disease  Pancytopenia associated with liver cirrhosis  Acute on chronic anemia: Patient is denying any signs and symptoms of bleeding, hemoglobin was down to 7.6.    Continue to monitor patient closely  Continue patient on current dose of midodrine and pantoprazole.  Continue patient on 2000 mL fluid restriction.  Patient has met with palliative team, at this time at like to continue with restorative care and would like to proceed with TIPS procedure.  Discussed the plan of care with gastroenterology this morning, planning TIPS procedure after the weekend, might help with recurrent paracentesis.  His hemoglobin has improved after 1 unit of packed red blood cell transfusion.  Patient has 1 reading of fever this morning which has resolved when rechecked, patient is receiving 7 days of ceftriaxone.  Preemptive infection, his initial paracentesis fluid was negative for infection.  Leger catheter has been removed, voiding well now.  Ammonia has been ordered by GI, not elevated does not have any signs of encephalopathy he did have some confusion this morning which resolved completely.  Will recheck CMP, sodium seems to be dropping.  Occult blood test has been checked which came back negative.  Patient will probably need follow-up with hematologist as an outpatient.  Patient will benefit from more structured long-term living to avoid relapse, patient has poor long-term prognosis, hopefully tips procedure will improve his quality of life.      Acute renal failure on top of CKD stage III-IV: His creatinine at baseline seems to be close to 1.6, now creatinine seems to be persistently close to 2 despite multiple paracentesis, albumin transfusion.  Type 2 hepatorenal syndrome:      -- Treatment as above for hepatorenal syndrome   --Leger catheter has been removed.  --Avoid nephrotoxic  agents.  --Continue 2 L fluid restriction.  --Monitor sodium level closely.    Right Umbilical hernia :  -- Have painful right lower abdominal hernia , not able to be reduced , patient wants to know if any thing can be done about it , discussed with him that at this point I don't think surgery will proceed with any procedure right away due to co morbidities and concern for type 2 hepatorenal syndrome   -- Patient was seen by surgery , high risk for complications , not a good candidate to go for surgery, no signs for hernia incarceration     Acute urinary retention  History of benign prostate hyperplasia  --Patient was retaining more than 800 mL ,also have acute renal failure, was unable to pass any urine, Jiang catheter was placed   --Strict intake and output.  --Continue patient on Flomax 0.8 mg p.o. daily.  --  Remoive jiang now     History of toxic encephalopathy from alcohol intoxication: Currently no signs and symptoms of encephalopathy this morning, seems to be able to answer all the questions.  Patient remains at very high risk for withdrawal though no signs seen thus far.    -- Continue CIWA with Ativan, so far no signs of alcohol withdrawl  -- multivitamin.  -- Continue patient on thiamine and folic acid.  -- Patient will not be started at this time on any clonidine due to hypotension  --We will continue patient on PTA dose of gabapentin 300 mg p.o. 3 times a day but will not give patient high dose of tapering Neurontin at this point.  -- GI is checking ammonia as above, patient does not have currently any signs and symptoms for encephalopathy     Essential hypertension  -- Patient does not seem to be taking any antihypertensive medication, continue to monitor.    COPD  -- Continue PTA Breo Ellipta 200/25 1 puff daily as needed.    Obstructive sleep apnea  --Continue CPAP    Chronic depression and anxiety  --Continue PTA Vortioxetine ,mirtazapine and trazodone and Seroquel    Diet: Regular Diet Adult  Fluid  restriction 2000 ML FLUID    Leger Catheter: in place, indication: Retention    DVT Prophylaxis: Pneumatic Compression Devices  Code Status: No CPR- Do NOT Intubate    The patient's care was discussed with the Bedside Nurse, Care Coordinator/ and Patient.    Disposition Plan   Expected discharge: Unfortunately at this time patient is not ready for discharge due to almost daily to every 48 hour requirement of paracentesis, GI is planning the TIPS procedure early next week, expecting patient to stay over the weekend.    Tiff Rodriguez MD, FACP  Text Page (7am - 6pm)    ----------------------------------------------------------------------------------------------------------------------    Interval History    Patient was seen and examined, lying in bed, had an episode of confusion this morning but then got better, had one episode of low-grade fever but on rechecking patient is afebrile.  Does not have any tachycardia or no other signs and symptoms of infection, patient is already receiving ceftriaxone for the last 7 days.  The patient is otherwise denying any new complaints, aware about plan for tentative tips procedure next week.      -Data reviewed today: I reviewed all new labs and imaging results over the last 24 hours.    I personally reviewed no images or EKG's today.    Physical Exam   Temp: 99.4  F (37.4  C) Temp src: Oral BP: 104/61 Pulse: 87   Resp: 16 SpO2: 97 % O2 Device: None (Room air)    Vitals:    06/07/21 1801 06/08/21 0700 06/10/21 0537   Weight: 74.7 kg (164 lb 11.2 oz) 75.2 kg (165 lb 11.2 oz) 78.4 kg (172 lb 12.8 oz)     Vital Signs with Ranges  Temp:  [98.6  F (37  C)-101.3  F (38.5  C)] 99.4  F (37.4  C)  Pulse:  [65-89] 87  Resp:  [16] 16  BP: (104-134)/(55-66) 104/61  SpO2:  [95 %-98 %] 97 %  I/O last 3 completed shifts:  In: 1920 [P.O.:1920]  Out: 2325 [Urine:2325]    GENERAL: Alert , awake and oriented. NAD. Conversational, appropriate.  Looks chronically sick  HEENT:  Normocephalic. EOMI. No icterus or injection. Nares normal.   LUNGS: Clear to auscultation with decreased air entry in the bases, no wheezing, no crackles  HEART: Regular rate. Extremities perfused.   ABDOMEN: Firm and more distended , worsened as compared to yesterday ,generalized tenderness no guarding or rigidity, no rebound tenderness at this point.  EXTREMITIES: No LE edema noted.   NEUROLOGIC: Moves extremities x4 on command. No acute focal neurologic abnormalities noted.     Medications     - MEDICATION INSTRUCTIONS -       - MEDICATION INSTRUCTIONS -         cefTRIAXone  1 g Intravenous Q24H     folic acid  1 mg Oral Daily     gabapentin  300 mg Oral TID     midodrine  2.5 mg Oral TID     mirtazapine  30 mg Oral At Bedtime     multivitamin w/minerals  1 tablet Oral Daily     nicotine   Transdermal Q8H     pantoprazole  40 mg Oral BID     QUEtiapine  200 mg Oral At Bedtime     sodium chloride (PF)  3 mL Intracatheter Q8H     tamsulosin  0.8 mg Oral Daily     vitamin B1  100 mg Oral Daily     traZODone  100 mg Oral At Bedtime     vortioxetine  10 mg Oral BID       Data   Recent Labs   Lab 06/10/21  1115 06/09/21  1028 06/08/21  0824 06/07/21  0839 06/07/21  0839 06/06/21  0600   WBC  --  5.1  --   --  3.7* 3.6*   HGB 8.1* 7.2* 7.2*  --  7.8* 7.4*   MCV  --  98  --   --  100 100   PLT  --  95*  --   --  109* 111*   INR  --   --   --   --   --  1.31*   * 133 133   < > 136 141   POTASSIUM 4.2 4.0 4.1   < > 3.9 4.0   CHLORIDE 104 106 107   < > 109 114*   CO2 20 22 21   < > 20 19*   BUN 28 26 28   < > 29 32*   CR 2.16* 2.20* 1.97*   < > 1.91* 1.98*   ANIONGAP 7 5 5   < > 7 8   KEV 8.0* 7.7* 7.6*   < > 8.1* 7.6*   * 133* 98   < > 77 81   ALBUMIN 3.1* 2.3* 2.3*  --   --  2.2*   PROTTOTAL 6.3* 5.5* 5.4*  --   --  5.8*   BILITOTAL 0.6 0.4 0.5  --   --  0.3   ALKPHOS 156* 144 132  --   --  160*   ALT 17 13 13  --   --  15   AST 30 18 18  --   --  31    < > = values in this interval not displayed.        Imaging:   No results found for this or any previous visit (from the past 24 hour(s)).

## 2021-06-12 ENCOUNTER — APPOINTMENT (OUTPATIENT)
Dept: OCCUPATIONAL THERAPY | Facility: CLINIC | Age: 66
DRG: 441 | End: 2021-06-12
Payer: MEDICARE

## 2021-06-12 LAB
AMMONIA PLAS-SCNC: 26 UMOL/L (ref 10–50)
ANION GAP SERPL CALCULATED.3IONS-SCNC: 8 MMOL/L (ref 3–14)
BUN SERPL-MCNC: 31 MG/DL (ref 7–30)
CALCIUM SERPL-MCNC: 8.2 MG/DL (ref 8.5–10.1)
CHLORIDE SERPL-SCNC: 105 MMOL/L (ref 94–109)
CO2 SERPL-SCNC: 18 MMOL/L (ref 20–32)
CREAT SERPL-MCNC: 2.28 MG/DL (ref 0.66–1.25)
GFR SERPL CREATININE-BSD FRML MDRD: 29 ML/MIN/{1.73_M2}
GLUCOSE SERPL-MCNC: 110 MG/DL (ref 70–99)
HGB BLD-MCNC: 8 G/DL (ref 13.3–17.7)
POTASSIUM SERPL-SCNC: 4.1 MMOL/L (ref 3.4–5.3)
SODIUM SERPL-SCNC: 131 MMOL/L (ref 133–144)

## 2021-06-12 PROCEDURE — 36415 COLL VENOUS BLD VENIPUNCTURE: CPT | Performed by: INTERNAL MEDICINE

## 2021-06-12 PROCEDURE — 120N000001 HC R&B MED SURG/OB

## 2021-06-12 PROCEDURE — 85018 HEMOGLOBIN: CPT | Performed by: INTERNAL MEDICINE

## 2021-06-12 PROCEDURE — 99232 SBSQ HOSP IP/OBS MODERATE 35: CPT | Performed by: INTERNAL MEDICINE

## 2021-06-12 PROCEDURE — 250N000011 HC RX IP 250 OP 636: Performed by: INTERNAL MEDICINE

## 2021-06-12 PROCEDURE — 250N000013 HC RX MED GY IP 250 OP 250 PS 637: Performed by: INTERNAL MEDICINE

## 2021-06-12 PROCEDURE — 97110 THERAPEUTIC EXERCISES: CPT | Mod: GO | Performed by: OCCUPATIONAL THERAPIST

## 2021-06-12 PROCEDURE — 80048 BASIC METABOLIC PNL TOTAL CA: CPT | Performed by: INTERNAL MEDICINE

## 2021-06-12 PROCEDURE — 97535 SELF CARE MNGMENT TRAINING: CPT | Mod: GO | Performed by: OCCUPATIONAL THERAPIST

## 2021-06-12 PROCEDURE — 82140 ASSAY OF AMMONIA: CPT | Performed by: INTERNAL MEDICINE

## 2021-06-12 RX ADMIN — GABAPENTIN 300 MG: 300 CAPSULE ORAL at 08:51

## 2021-06-12 RX ADMIN — QUETIAPINE FUMARATE 50 MG: 25 TABLET ORAL at 00:57

## 2021-06-12 RX ADMIN — ESZOPICLONE 3 MG: 3 TABLET, FILM COATED OROPHARYNGEAL at 02:10

## 2021-06-12 RX ADMIN — CARBIDOPA AND LEVODOPA 2.5 MG: 50; 200 TABLET, EXTENDED RELEASE ORAL at 13:12

## 2021-06-12 RX ADMIN — TRAZODONE HYDROCHLORIDE 100 MG: 100 TABLET ORAL at 22:58

## 2021-06-12 RX ADMIN — MULTIPLE VITAMINS W/ MINERALS TAB 1 TABLET: TAB at 08:52

## 2021-06-12 RX ADMIN — NICOTINE 1 PATCH: 21 PATCH, EXTENDED RELEASE TRANSDERMAL at 08:55

## 2021-06-12 RX ADMIN — CARBIDOPA AND LEVODOPA 2.5 MG: 50; 200 TABLET, EXTENDED RELEASE ORAL at 08:51

## 2021-06-12 RX ADMIN — CEFTRIAXONE SODIUM 1 G: 1 INJECTION, POWDER, FOR SOLUTION INTRAMUSCULAR; INTRAVENOUS at 12:33

## 2021-06-12 RX ADMIN — CARBIDOPA AND LEVODOPA 2.5 MG: 50; 200 TABLET, EXTENDED RELEASE ORAL at 18:21

## 2021-06-12 RX ADMIN — PANTOPRAZOLE SODIUM 40 MG: 40 TABLET, DELAYED RELEASE ORAL at 08:51

## 2021-06-12 RX ADMIN — QUETIAPINE FUMARATE 200 MG: 100 TABLET ORAL at 22:08

## 2021-06-12 RX ADMIN — GABAPENTIN 300 MG: 300 CAPSULE ORAL at 18:21

## 2021-06-12 RX ADMIN — THIAMINE HCL TAB 100 MG 100 MG: 100 TAB at 08:52

## 2021-06-12 RX ADMIN — MIRTAZAPINE 30 MG: 15 TABLET, FILM COATED ORAL at 22:58

## 2021-06-12 RX ADMIN — TAMSULOSIN HYDROCHLORIDE 0.8 MG: 0.4 CAPSULE ORAL at 08:52

## 2021-06-12 RX ADMIN — GABAPENTIN 300 MG: 300 CAPSULE ORAL at 22:06

## 2021-06-12 RX ADMIN — FOLIC ACID 1 MG: 1 TABLET ORAL at 08:52

## 2021-06-12 RX ADMIN — PANTOPRAZOLE SODIUM 40 MG: 40 TABLET, DELAYED RELEASE ORAL at 22:07

## 2021-06-12 RX ADMIN — VORTIOXETINE 10 MG: 10 TABLET, FILM COATED ORAL at 08:51

## 2021-06-12 RX ADMIN — VORTIOXETINE 10 MG: 10 TABLET, FILM COATED ORAL at 22:06

## 2021-06-12 ASSESSMENT — ACTIVITIES OF DAILY LIVING (ADL)
ADLS_ACUITY_SCORE: 13
ADLS_ACUITY_SCORE: 14
ADLS_ACUITY_SCORE: 13

## 2021-06-12 ASSESSMENT — MIFFLIN-ST. JEOR: SCORE: 1507.3

## 2021-06-12 NOTE — PLAN OF CARE
Cognitive Concerns/ Orientation : A&Ox4, forgetful at times.   BEHAVIOR & AGGRESSION TOOL COLOR: Green  CIWA SCORE: 0, 0  ABNL VS/O2: VSS on RA, slight temp 100.4, 100  recheck Temp  99.2 no interventions given  MOBILITY: Ax1 GB/walker, up in chair for meals.  PAIN MANAGMENT: Denies pain  DIET: Regular diet, Fluid restriction 2000ml  BOWEL/BLADDER: Leger patent with good output  ABNL LAB/BG: Cr 2.15, Na 127, Albumin 2.9   DRAIN/DEVICES: PIV access x1 SL  TELEMETRY RHYTHM: NA  SKIN: Scattered scabbing/bruising, band aid paracentesis site on Rt side abd, CDI. Small open wound on Rt elbow- dressing CDI.   TESTS/PROCEDURES: Possible TIPS procedure on Monday.    D/C DATE: TBD  OTHER IMPORTANT INFORMATION:  Nicotine patch on Rt arm. GI following.

## 2021-06-12 NOTE — PROVIDER NOTIFICATION
Paged Dr. Rodriguez: PVR was 300 but do you think it was accurate with ascites?    Received callback and discussed pts case.  Check PVR again next time and if still 300-400 do a straight cath to see.

## 2021-06-12 NOTE — PLAN OF CARE
VSS on RA.  Up with 1.  Ambulated in halls x1.  Pt very forgetful.  Up in chair multiple times.  Denies pain or shortness of breath.  Arsen removed this shift.  Plan for TIPS Monday or Tuesday.  Call light within reach.

## 2021-06-12 NOTE — PROGRESS NOTES
[facility]    HOSPITALIST PROGRESS NOTE :   --------------------------------------------------    Date of Admission:  6/6/2021    Cumulative Summary: Jim Richard is a 66 year old male with past medical history significant for persistent alcohol abuse, alcoholic cirrhosis, recurrent ascites requiring weekly to biweekly paracentesis, COPD, CAD, esophageal varices, essential hypertension, peripheral vascular disease, pancytopenia, chronic kidney disease or chronic thrombocytopenia and anemia presented to the emergency room on June 5th due to the concern for new and acute abdominal pain with distention associated with ascites.  Patient admitted drinking a pint of vodka the night before.    Assessment & Plan     Acute generalized abdominal pain: Concern for possible spontaneous bacterial peritonitis.  Acute renal failure on top of chronic kidney disease; low urine output, patient does not seem to be on any diuretics at home, denying following up with gastroenterology as an outpatient, at this time there is also concern for development of hepatorenal syndrome.  Recurrent large-volume ascites due to alcoholic liver disease: According to patient, he is requiring paracentesis every 2 weeks and usually requires anywhere from 5 to 7 L of fluid removal.  According to patient, he underwent fluid removal in the emergency department where couple of liters of fluid was removed.  This morning patient remains significantly uncomfortable with distended abdomen, denying any similar pain in the past with the presence of ascites only.  Initial fluid analysis showing only 10 WBC count does not seem to be concerning for SBP.  06/07/21:Status post paracentesis , 5500 ml yellow fluid was removed , in addition to couple of liters removed in ED  Alcoholic liver cirrhosis; unfortunately patient continues to consume alcohol and admits consuming 1 pint of vodka the night before coming to the hospital.  Discussed  with him regarding my concern for significantly associated morbidity and mortality with ongoing alcohol consumption  End-stage liver disease  Pancytopenia associated with liver cirrhosis  Acute on chronic anemia: Patient is denying any signs and symptoms of bleeding, hemoglobin was down to 7.6.    Continue to monitor patient closely.  Continue patient on current dose of midodrine and pantoprazole.  Continue patient on 2000 mL fluid restriction.  Patient met with palliative team, at this time would like to continue with restorative care and would like to proceed with TIPS procedure  His hemoglobin has improved after 1 unit of packed red blood cell transfusion.  Patient has 1 reading of fever this morning which has resolved when rechecked, patient is receiving 7 days of ceftriaxone.  Preemptive infection, his initial paracentesis fluid was negative for infection.  Leger catheter has been removed this morning , monitor for bladder retention , straight cath if retaining more than 350 ml   Will check ammonia level   Will recheck CMP, sodium seems to be dropping.  Occult blood test has been checked which came back negative.  Patient will probably need follow-up with hematologist as an outpatient.  Patient will benefit from more structured long-term living to avoid relapse, patient has poor long-term prognosis, hopefully tips procedure will improve his quality of life tentatively on Monday     Acute renal failure on top of CKD stage III-IV: His creatinine at baseline seems to be close to 1.6, now creatinine seems to be persistently close to 2 despite multiple paracentesis, albumin transfusion.  Type 2 hepatorenal syndrome:      -- Treatment as above for hepatorenal syndrome   -- Leger catheter has been removed.  -- Avoid nephrotoxic agents.  -- Continue 2 L fluid restriction.  -- Monitor sodium level closely.    Right Umbilical hernia :  -- Have painful right lower abdominal hernia , not able to be reduced , patient wants  to know if any thing can be done about it , discussed with him that at this point I don't think surgery will proceed with any procedure right away due to co morbidities and concern for type 2 hepatorenal syndrome   -- Patient was seen by surgery , high risk for complications , not a good candidate to go for surgery, no signs for hernia incarceration     Acute urinary retention  History of benign prostate hyperplasia  --Patient was retaining more than 800 mL ,also have acute renal failure, was unable to pass any urine, Jiang catheter was placed   --Strict intake and output.  --Continue patient on Flomax 0.8 mg p.o. daily.  --  Remoive jiang now     History of toxic encephalopathy from alcohol intoxication: Currently no signs and symptoms of encephalopathy this morning, seems to be able to answer all the questions.  Patient remains at very high risk for withdrawal though no signs seen thus far.    -- Continue CIWA with Ativan, so far no signs of alcohol withdrawl  -- multivitamin.  -- Continue patient on thiamine and folic acid.  -- Patient will not be started at this time on any clonidine due to hypotension  --We will continue patient on PTA dose of gabapentin 300 mg p.o. 3 times a day but will not give patient high dose of tapering Neurontin at this point.  -- GI is checking ammonia as above, patient does not have currently any signs and symptoms for encephalopathy     Essential hypertension  -- Patient does not seem to be taking any antihypertensive medication, continue to monitor.    COPD  -- Continue PTA Breo Ellipta 200/25 1 puff daily as needed.    Obstructive sleep apnea  --Continue CPAP    Chronic depression and anxiety  --Continue PTA Vortioxetine ,mirtazapine and trazodone and Seroquel    Diet: Regular Diet Adult  Fluid restriction 2000 ML FLUID    Jiang Catheter: in place, indication: Retention    DVT Prophylaxis: Pneumatic Compression Devices  Code Status: No CPR- Do NOT Intubate    The patient's care was  discussed with the Bedside Nurse, Care Coordinator/ and Patient.    Disposition Plan   Expected discharge: Unfortunately at this time patient is not ready for discharge due to almost daily to every 48 hour requirement of paracentesis, GI is planning the TIPS procedure early next week, expecting patient to stay over the weekend.    Tiff Rodriguez MD, FACP  Text Page (7am - 6pm)    ----------------------------------------------------------------------------------------------------------------------    Interval History    Was seen and examined, lying in bed, feeling tired, discussed with nursing regarding removing Leger catheter, feeling more fatigued, abdomen has been soft and does not need paracentesis today, aware about plan for proceeding with TIPS in the coming week.  Denying any fever or chills has been receiving ceftriaxone.     -Data reviewed today: I reviewed all new labs and imaging results over the last 24 hours.    I personally reviewed no images or EKG's today.    Physical Exam   Temp: 99.4  F (37.4  C) Temp src: Oral BP: 120/61 Pulse: 81   Resp: 18 SpO2: 92 % O2 Device: None (Room air)    Vitals:    06/08/21 0700 06/10/21 0537 06/12/21 0517   Weight: 75.2 kg (165 lb 11.2 oz) 78.4 kg (172 lb 12.8 oz) 77.4 kg (170 lb 9 oz)     Vital Signs with Ranges  Temp:  [99.1  F (37.3  C)-100.4  F (38  C)] 99.4  F (37.4  C)  Pulse:  [65-81] 81  Resp:  [16-18] 18  BP: (120-131)/(61-69) 120/61  SpO2:  [92 %-98 %] 92 %  I/O last 3 completed shifts:  In: 1514 [P.O.:1514]  Out: 2870 [Urine:2870]    GENERAL: Alert , awake and oriented. NAD. Conversational, appropriate.  Looks chronically sick  HEENT: Normocephalic. EOMI. No icterus or injection. Nares normal.   LUNGS: Clear to auscultation with decreased air entry in the bases, no wheezing, no crackles  HEART: Regular rate. Extremities perfused.   ABDOMEN: Firm and more distended , worsened as compared to yesterday ,generalized tenderness no guarding or rigidity,  no rebound tenderness at this point.  EXTREMITIES: No LE edema noted.   NEUROLOGIC: Moves extremities x4 on command. No acute focal neurologic abnormalities noted.     Medications     - MEDICATION INSTRUCTIONS -       - MEDICATION INSTRUCTIONS -         cefTRIAXone  1 g Intravenous Q24H     folic acid  1 mg Oral Daily     gabapentin  300 mg Oral TID     midodrine  2.5 mg Oral TID     mirtazapine  30 mg Oral At Bedtime     multivitamin w/minerals  1 tablet Oral Daily     nicotine   Transdermal Q8H     pantoprazole  40 mg Oral BID     QUEtiapine  200 mg Oral At Bedtime     sodium chloride (PF)  3 mL Intracatheter Q8H     tamsulosin  0.8 mg Oral Daily     vitamin B1  100 mg Oral Daily     traZODone  100 mg Oral At Bedtime     vortioxetine  10 mg Oral BID       Data   Recent Labs   Lab 06/12/21  1002 06/11/21  1110 06/10/21  1115 06/09/21  1028 06/07/21  0839 06/07/21  0839 06/06/21  0600   WBC  --   --   --  5.1  --  3.7* 3.6*   HGB 8.0*  --  8.1* 7.2*   < > 7.8* 7.4*   MCV  --   --   --  98  --  100 100   PLT  --   --   --  95*  --  109* 111*   INR  --   --   --   --   --   --  1.31*   * 127* 131* 133   < > 136 141   POTASSIUM 4.1 4.3 4.2 4.0   < > 3.9 4.0   CHLORIDE 105 101 104 106   < > 109 114*   CO2 18* 17* 20 22   < > 20 19*   BUN 31* 31* 28 26   < > 29 32*   CR 2.28* 2.15* 2.16* 2.20*   < > 1.91* 1.98*   ANIONGAP 8 9 7 5   < > 7 8   KEV 8.2* 8.1* 8.0* 7.7*   < > 8.1* 7.6*   * 121* 107* 133*   < > 77 81   ALBUMIN  --  2.9* 3.1* 2.3*   < >  --  2.2*   PROTTOTAL  --  6.1* 6.3* 5.5*   < >  --  5.8*   BILITOTAL  --  0.6 0.6 0.4   < >  --  0.3   ALKPHOS  --  166* 156* 144   < >  --  160*   ALT  --  18 17 13   < >  --  15   AST  --  30 30 18   < >  --  31    < > = values in this interval not displayed.       Imaging:   No results found for this or any previous visit (from the past 24 hour(s)).

## 2021-06-12 NOTE — PLAN OF CARE
DATE & TIME: 6/11/21 5697-2364  Cognitive Concerns/ Orientation : A&Ox4, forgetful at times.   BEHAVIOR & AGGRESSION TOOL COLOR: Green  CIWA SCORE: 0, 0  ABNL VS/O2: VSS on RA, slight temp 99.3- no interventions  MOBILITY: Ax1 GB/walker, up in chair for meals.  PAIN MANAGMENT: C/o intermittent Rt sided abd pain, given PRN Oxy x1  DIET: Regular diet, Fluid restriction 2000ml  BOWEL/BLADDER: Leger patent with high output  ABNL LAB/BG: Cr 2.15, Na 127, Albumin 2.9   DRAIN/DEVICES: PIV access x1 SL  TELEMETRY RHYTHM: NA  SKIN: Scattered scabbing/bruising, band aid paracentesis site on Rt side abd, CDI. Small open wound on Rt elbow- dressing CDI.   TESTS/PROCEDURES: Possible TIPS procedure on Monday.    D/C DATE: TBD  OTHER IMPORTANT INFORMATION:  Nicotine patch on Rt arm. GI following.

## 2021-06-12 NOTE — PROVIDER NOTIFICATION
Paged Dr. Rodriguez: Pt more lethargic. falls asleep while im talking to him.  Any concerns?    Received call back and added an ammonia level and monitor closely.

## 2021-06-13 ENCOUNTER — APPOINTMENT (OUTPATIENT)
Dept: GENERAL RADIOLOGY | Facility: CLINIC | Age: 66
DRG: 441 | End: 2021-06-13
Attending: INTERNAL MEDICINE
Payer: MEDICARE

## 2021-06-13 LAB
ALBUMIN UR-MCNC: NEGATIVE MG/DL
ANION GAP SERPL CALCULATED.3IONS-SCNC: 10 MMOL/L (ref 3–14)
ANISOCYTOSIS BLD QL SMEAR: SLIGHT
APPEARANCE UR: CLEAR
BASOPHILS # BLD AUTO: 0.1 10E9/L (ref 0–0.2)
BASOPHILS NFR BLD AUTO: 2 %
BILIRUB UR QL STRIP: NEGATIVE
BUN SERPL-MCNC: 33 MG/DL (ref 7–30)
BURR CELLS BLD QL SMEAR: SLIGHT
CALCIUM SERPL-MCNC: 8.5 MG/DL (ref 8.5–10.1)
CHLORIDE SERPL-SCNC: 104 MMOL/L (ref 94–109)
CO2 SERPL-SCNC: 16 MMOL/L (ref 20–32)
COLOR UR AUTO: ABNORMAL
CREAT SERPL-MCNC: 2.11 MG/DL (ref 0.66–1.25)
DACRYOCYTES BLD QL SMEAR: SLIGHT
DIFFERENTIAL METHOD BLD: ABNORMAL
EOSINOPHIL # BLD AUTO: 0.1 10E9/L (ref 0–0.7)
EOSINOPHIL NFR BLD AUTO: 2 %
ERYTHROCYTE [DISTWIDTH] IN BLOOD BY AUTOMATED COUNT: 17.4 % (ref 10–15)
GFR SERPL CREATININE-BSD FRML MDRD: 31 ML/MIN/{1.73_M2}
GLUCOSE SERPL-MCNC: 95 MG/DL (ref 70–99)
GLUCOSE UR STRIP-MCNC: NEGATIVE MG/DL
HCT VFR BLD AUTO: 24.9 % (ref 40–53)
HGB BLD-MCNC: 8 G/DL (ref 13.3–17.7)
HGB UR QL STRIP: NEGATIVE
KETONES UR STRIP-MCNC: NEGATIVE MG/DL
LEUKOCYTE ESTERASE UR QL STRIP: NEGATIVE
LYMPHOCYTES # BLD AUTO: 0.3 10E9/L (ref 0.8–5.3)
LYMPHOCYTES NFR BLD AUTO: 7 %
MCH RBC QN AUTO: 30.1 PG (ref 26.5–33)
MCHC RBC AUTO-ENTMCNC: 32.1 G/DL (ref 31.5–36.5)
MCV RBC AUTO: 94 FL (ref 78–100)
MONOCYTES # BLD AUTO: 0.8 10E9/L (ref 0–1.3)
MONOCYTES NFR BLD AUTO: 19 %
MUCOUS THREADS #/AREA URNS LPF: PRESENT /LPF
NEUTROPHILS # BLD AUTO: 3.1 10E9/L (ref 1.6–8.3)
NEUTROPHILS NFR BLD AUTO: 70 %
NITRATE UR QL: NEGATIVE
OVALOCYTES BLD QL SMEAR: SLIGHT
PH UR STRIP: 5.5 PH (ref 5–7)
PLATELET # BLD AUTO: 159 10E9/L (ref 150–450)
PLATELET # BLD EST: ABNORMAL 10*3/UL
POTASSIUM SERPL-SCNC: 3.9 MMOL/L (ref 3.4–5.3)
RBC # BLD AUTO: 2.66 10E12/L (ref 4.4–5.9)
RBC #/AREA URNS AUTO: 1 /HPF (ref 0–2)
SODIUM SERPL-SCNC: 130 MMOL/L (ref 133–144)
SOURCE: ABNORMAL
SP GR UR STRIP: 1.01 (ref 1–1.03)
SQUAMOUS #/AREA URNS AUTO: 0 /HPF (ref 0–1)
UROBILINOGEN UR STRIP-MCNC: 0 MG/DL (ref 0–2)
WBC # BLD AUTO: 4.4 10E9/L (ref 4–11)
WBC #/AREA URNS AUTO: 3 /HPF (ref 0–5)

## 2021-06-13 PROCEDURE — 71046 X-RAY EXAM CHEST 2 VIEWS: CPT

## 2021-06-13 PROCEDURE — 250N000011 HC RX IP 250 OP 636: Performed by: INTERNAL MEDICINE

## 2021-06-13 PROCEDURE — 250N000013 HC RX MED GY IP 250 OP 250 PS 637: Performed by: INTERNAL MEDICINE

## 2021-06-13 PROCEDURE — 85025 COMPLETE CBC W/AUTO DIFF WBC: CPT | Performed by: INTERNAL MEDICINE

## 2021-06-13 PROCEDURE — 99233 SBSQ HOSP IP/OBS HIGH 50: CPT | Performed by: INTERNAL MEDICINE

## 2021-06-13 PROCEDURE — 36415 COLL VENOUS BLD VENIPUNCTURE: CPT | Performed by: INTERNAL MEDICINE

## 2021-06-13 PROCEDURE — 80048 BASIC METABOLIC PNL TOTAL CA: CPT | Performed by: INTERNAL MEDICINE

## 2021-06-13 PROCEDURE — 120N000001 HC R&B MED SURG/OB

## 2021-06-13 PROCEDURE — 87040 BLOOD CULTURE FOR BACTERIA: CPT | Performed by: INTERNAL MEDICINE

## 2021-06-13 PROCEDURE — 81001 URINALYSIS AUTO W/SCOPE: CPT | Performed by: INTERNAL MEDICINE

## 2021-06-13 RX ORDER — PIPERACILLIN SODIUM, TAZOBACTAM SODIUM 2; .25 G/10ML; G/10ML
2.25 INJECTION, POWDER, LYOPHILIZED, FOR SOLUTION INTRAVENOUS EVERY 6 HOURS
Status: DISCONTINUED | OUTPATIENT
Start: 2021-06-13 | End: 2021-06-16

## 2021-06-13 RX ADMIN — NICOTINE 1 PATCH: 21 PATCH, EXTENDED RELEASE TRANSDERMAL at 13:18

## 2021-06-13 RX ADMIN — GABAPENTIN 300 MG: 300 CAPSULE ORAL at 21:58

## 2021-06-13 RX ADMIN — QUETIAPINE FUMARATE 50 MG: 25 TABLET ORAL at 16:29

## 2021-06-13 RX ADMIN — VORTIOXETINE 10 MG: 10 TABLET, FILM COATED ORAL at 21:57

## 2021-06-13 RX ADMIN — PANTOPRAZOLE SODIUM 40 MG: 40 TABLET, DELAYED RELEASE ORAL at 08:19

## 2021-06-13 RX ADMIN — MIRTAZAPINE 30 MG: 15 TABLET, FILM COATED ORAL at 21:57

## 2021-06-13 RX ADMIN — CARBIDOPA AND LEVODOPA 2.5 MG: 50; 200 TABLET, EXTENDED RELEASE ORAL at 18:44

## 2021-06-13 RX ADMIN — VORTIOXETINE 10 MG: 10 TABLET, FILM COATED ORAL at 08:18

## 2021-06-13 RX ADMIN — CARBIDOPA AND LEVODOPA 2.5 MG: 50; 200 TABLET, EXTENDED RELEASE ORAL at 08:18

## 2021-06-13 RX ADMIN — THIAMINE HCL TAB 100 MG 100 MG: 100 TAB at 08:18

## 2021-06-13 RX ADMIN — TAMSULOSIN HYDROCHLORIDE 0.8 MG: 0.4 CAPSULE ORAL at 08:19

## 2021-06-13 RX ADMIN — FOLIC ACID 1 MG: 1 TABLET ORAL at 08:19

## 2021-06-13 RX ADMIN — PIPERACILLIN SODIUM AND TAZOBACTAM SODIUM 2.25 G: 2; .25 INJECTION, POWDER, LYOPHILIZED, FOR SOLUTION INTRAVENOUS at 18:43

## 2021-06-13 RX ADMIN — CARBIDOPA AND LEVODOPA 2.5 MG: 50; 200 TABLET, EXTENDED RELEASE ORAL at 13:18

## 2021-06-13 RX ADMIN — MULTIPLE VITAMINS W/ MINERALS TAB 1 TABLET: TAB at 08:19

## 2021-06-13 RX ADMIN — GABAPENTIN 300 MG: 300 CAPSULE ORAL at 16:18

## 2021-06-13 RX ADMIN — PANTOPRAZOLE SODIUM 40 MG: 40 TABLET, DELAYED RELEASE ORAL at 21:58

## 2021-06-13 RX ADMIN — QUETIAPINE FUMARATE 200 MG: 100 TABLET ORAL at 21:57

## 2021-06-13 RX ADMIN — TRAZODONE HYDROCHLORIDE 100 MG: 100 TABLET ORAL at 21:58

## 2021-06-13 RX ADMIN — CEFTRIAXONE SODIUM 1 G: 1 INJECTION, POWDER, FOR SOLUTION INTRAMUSCULAR; INTRAVENOUS at 13:18

## 2021-06-13 RX ADMIN — QUETIAPINE FUMARATE 50 MG: 25 TABLET ORAL at 02:39

## 2021-06-13 RX ADMIN — GABAPENTIN 300 MG: 300 CAPSULE ORAL at 08:18

## 2021-06-13 ASSESSMENT — ACTIVITIES OF DAILY LIVING (ADL)
ADLS_ACUITY_SCORE: 14
ADLS_ACUITY_SCORE: 14
ADLS_ACUITY_SCORE: 15
ADLS_ACUITY_SCORE: 14
ADLS_ACUITY_SCORE: 15
ADLS_ACUITY_SCORE: 14

## 2021-06-13 NOTE — PROGRESS NOTES
[facility]    HOSPITALIST PROGRESS NOTE :   --------------------------------------------------    Date of Admission:  6/6/2021    Cumulative Summary: Jim Richard is a 66 year old male with past medical history significant for persistent alcohol abuse, alcoholic cirrhosis, recurrent ascites requiring weekly to biweekly paracentesis, COPD, CAD, esophageal varices, essential hypertension, peripheral vascular disease, pancytopenia, chronic kidney disease or chronic thrombocytopenia and anemia presented to the emergency room on June 5th due to the concern for new and acute abdominal pain with distention associated with ascites.  Patient admitted drinking a pint of vodka the night before.    Assessment & Plan     Acute generalized abdominal pain: Concern for possible spontaneous bacterial peritonitis.  Acute renal failure on top of chronic kidney disease; low urine output, patient does not seem to be on any diuretics at home, denying following up with gastroenterology as an outpatient, at this time there is also concern for development of hepatorenal syndrome.  Recurrent large-volume ascites due to alcoholic liver disease: According to patient, he is requiring paracentesis every 2 weeks and usually requires anywhere from 5 to 7 L of fluid removal.  According to patient, he underwent fluid removal in the emergency department where couple of liters of fluid was removed.  This morning patient remains significantly uncomfortable with distended abdomen, denying any similar pain in the past with the presence of ascites only.  Initial fluid analysis showing only 10 WBC count does not seem to be concerning for SBP.  06/07/21:Status post paracentesis , 5500 ml yellow fluid was removed , in addition to couple of liters removed in ED  Alcoholic liver cirrhosis; unfortunately patient continues to consume alcohol and admits consuming 1 pint of vodka the night before coming to the hospital.  Discussed  with him regarding my concern for significantly associated morbidity and mortality with ongoing alcohol consumption  End-stage liver disease  Pancytopenia associated with liver cirrhosis  Acute on chronic anemia: Patient is denying any signs and symptoms of bleeding, hemoglobin was down to 7.6.    --Continue to monitor patient closely.  --Continue patient on current dose of midodrine.  --Continue patient on current dose of pantoprazole.  --Patient has been a started on 2000 mL fluid restriction, tolerating it well.  --During the hospitalization patient was also seen by palliative team and at this time would like to continue with restorative care and would like to proceed with TIPS procedure.  --His abdomen seems to be distended but soft and does think that he can hold off on paracentesis till tomorrow.  --Gastroenterology has ordered paracentesis to be repeated tomorrow morning.  --Usually patient requires albumin before paracentesis and then couple of doses after to avoid hypotension and worsening of renal function.  --Patient has also required 1 unit of packed red blood cell transfusion during the hospitalization, since then his hemoglobin is a stable and his lightheadedness is slightly improved.  --Patient was also treated with preemptive course of ceftriaxone for 5 days for possibility of SBP as he presented with significant abdominal pain, his fluid was negative for any signs of infection, fluid analysis was repeated with his last paracentesis done on Friday.  --Occult blood has been checked during the hospitalization which came back negative.  --Patient will need close follow-up with gastroenterology and hepatologist as an outpatient.  --Patient will also benefit from a structured long-term living to avoid relapse.    Low-grade fever, coughing, shortness of breath on exertion: For the past couple of days patient has a started to noticed persistent cough, and has been noticed to have temperature of 100  F once  or twice a day despite being on ceftriaxone.  --Discussed with patient regarding concern for new infection, his ascitic fluid analysis was negative for infection on Friday.  --We will check urine analysis, results were reviewed which is negative for any UTI.  --2 view chest x-ray was ordered, chest x-ray was reviewed personally, showing new right-sided middle lobe infiltrates possibly concerning for aspiration PNA  --We will go ahead and start patient on Zosyn 3.375 g every 8 hours considering his renal status.  --Continue to monitor patient closely.    Acute renal failure on top of CKD stage III-IV: His creatinine at baseline seems to be close to 1.6, now creatinine seems to be persistently close to 2 despite multiple paracentesis, albumin transfusion.  Type 2 hepatorenal syndrome:      Treatment as above for hepatorenal syndrome type II.  Leger catheter has been removed, patient has very poor urine output, discussed with bedside nursing to monitor for urinary retention.  Gastroenterology is planning tips procedure.  Diuretics are completely avoided due to the elevated creatinine but unfortunately no significant improvement has been noticed.  Continue 2 L fluid restriction.  Monitor sodium levels closely, patient does not want to be on a low-salt diet but usually does not order any high salt food.    Right Umbilical hernia :  -- Have painful right lower abdominal hernia , not able to be reduced , patient wants to know if any thing can be done about it , discussed with him that at this point I don't think surgery will proceed with any procedure right away due to co morbidities and concern for type 2 hepatorenal syndrome   -- Patient was seen by surgery , high risk for complications , not a good candidate to go for surgery, no signs for hernia incarceration     Acute urinary retention  History of benign prostate hyperplasia  --Patient was retaining more than 800 mL ,also have acute renal failure, was unable to pass  any urine, Leger catheter was placed   --Strict intake and output.  --Continue patient on Flomax 0.8 mg p.o. daily.  --  Removed catheter yesterday, so far tolerating it well      History of toxic encephalopathy from alcohol intoxication: Currently no signs and symptoms of encephalopathy this morning, seems to be able to answer all the questions.  Patient remains at very high risk for withdrawal though no signs seen thus far.    -- Continue CIWA with Ativan, so far no signs of alcohol withdrawal, CIWA is now cancelled for last 2 days   -- multivitamin.  -- Continue patient on thiamine and folic acid.  -- Patient will not be started at this time on any clonidine due to hypotension  --We will continue patient on PTA dose of gabapentin 300 mg p.o. 3 times a day but will not give patient high dose of tapering Neurontin at this point.  -- GI is checking ammonia as above, patient does not have currently any signs and symptoms for encephalopathy     Essential hypertension  -- Patient does not seem to be taking any antihypertensive medication, continue to monitor.    COPD  -- Continue PTA Breo Ellipta 200/25 1 puff daily as needed.    Obstructive sleep apnea  --Continue CPAP    Chronic depression and anxiety  --Continue PTA Vortioxetine ,mirtazapine and trazodone and Seroquel    Diet: Regular Diet Adult  Fluid restriction 2000 ML FLUID  Snacks/Supplements Adult: Ensure Enlive; Between Meals    Leger Catheter: not present    DVT Prophylaxis: Pneumatic Compression Devices  Code Status: No CPR- Do NOT Intubate    The patient's care was discussed with the Bedside Nurse, Care Coordinator/ and Patient.    Disposition Plan   Expected discharge: Unfortunately at this time patient is not ready for discharge due to almost daily to every 48 hour requirement of paracentesis, GI is planning the TIPS procedure early next week, expecting patient to stay over the weekend.Patient is also diagnosed with aspiration PNA today   More than 35 min of the time was spend in care today, more than 50% of the time was spent in patient care coordination and counseling.    Tiff Rodriguez MD, [facility]  Text Page (7am - 6pm)    ----------------------------------------------------------------------------------------------------------------------    Interval History    Patient was seen and examined, sitting in chair, feels more tired, fatigued, again have low-grade fever this morning and also having this persistent cough, trying to walk every day but thinks he is slightly short of breath, he is again retaining fluid in the abdomen but thinks that he can wait another day for fluid removal.  GI has already ordered paracentesis to be done tomorrow.  Patient is aware that GI is also planning TIPS procedure either tomorrow or Tuesday.     -Data reviewed today: I reviewed all new labs and imaging results over the last 24 hours.    I personally reviewed no images or EKG's today.    Physical Exam   Temp: 100  F (37.8  C) Temp src: Oral BP: 119/61 Pulse: 86   Resp: 18 SpO2: 93 % O2 Device: None (Room air)    Vitals:    06/08/21 0700 06/10/21 0537 06/12/21 0517   Weight: 75.2 kg (165 lb 11.2 oz) 78.4 kg (172 lb 12.8 oz) 77.4 kg (170 lb 9 oz)     Vital Signs with Ranges  Temp:  [98.2  F (36.8  C)-100  F (37.8  C)] 100  F (37.8  C)  Pulse:  [76-86] 86  Resp:  [18] 18  BP: (117-145)/(61-76) 119/61  SpO2:  [93 %-97 %] 93 %  I/O last 3 completed shifts:  In: 1150 [P.O.:1150]  Out: 560 [Urine:560]    GENERAL: Alert , awake and oriented. NAD. Conversational, appropriate.  Looks chronically sick  HEENT: Normocephalic. EOMI. No icterus or injection. Nares normal.   LUNGS: Clear to auscultation with decreased air entry in the bases, no wheezing, no crackles  HEART: Regular rate. Extremities perfused.   ABDOMEN: Firm and more distended , worsened as compared to yesterday ,generalized tenderness no guarding or rigidity, no rebound tenderness at this point.  EXTREMITIES: No LE  edema noted.   NEUROLOGIC: Moves extremities x4 on command. No acute focal neurologic abnormalities noted.     Medications     - MEDICATION INSTRUCTIONS -       - MEDICATION INSTRUCTIONS -         folic acid  1 mg Oral Daily     gabapentin  300 mg Oral TID     midodrine  2.5 mg Oral TID     mirtazapine  30 mg Oral At Bedtime     multivitamin w/minerals  1 tablet Oral Daily     nicotine   Transdermal Q8H     pantoprazole  40 mg Oral BID     QUEtiapine  200 mg Oral At Bedtime     sodium chloride (PF)  3 mL Intracatheter Q8H     tamsulosin  0.8 mg Oral Daily     vitamin B1  100 mg Oral Daily     traZODone  100 mg Oral At Bedtime     vortioxetine  10 mg Oral BID       Data   Recent Labs   Lab 06/13/21  0849 06/12/21  1002 06/11/21  1110 06/10/21  1115 06/09/21  1028 06/07/21  0839 06/07/21  0839   WBC 4.4  --   --   --  5.1  --  3.7*   HGB 8.0* 8.0*  --  8.1* 7.2*   < > 7.8*   MCV 94  --   --   --  98  --  100     --   --   --  95*  --  109*   * 131* 127* 131* 133   < > 136   POTASSIUM 3.9 4.1 4.3 4.2 4.0   < > 3.9   CHLORIDE 104 105 101 104 106   < > 109   CO2 16* 18* 17* 20 22   < > 20   BUN 33* 31* 31* 28 26   < > 29   CR 2.11* 2.28* 2.15* 2.16* 2.20*   < > 1.91*   ANIONGAP 10 8 9 7 5   < > 7   KEV 8.5 8.2* 8.1* 8.0* 7.7*   < > 8.1*   GLC 95 110* 121* 107* 133*   < > 77   ALBUMIN  --   --  2.9* 3.1* 2.3*   < >  --    PROTTOTAL  --   --  6.1* 6.3* 5.5*   < >  --    BILITOTAL  --   --  0.6 0.6 0.4   < >  --    ALKPHOS  --   --  166* 156* 144   < >  --    ALT  --   --  18 17 13   < >  --    AST  --   --  30 30 18   < >  --     < > = values in this interval not displayed.       Imaging:   Recent Results (from the past 24 hour(s))   XR Chest 2 Views    Narrative    CHEST TWO VIEWS June 13, 2021 8:35 AM     HISTORY: Fever and cough.    COMPARISON: 3/26/2021.      Impression    IMPRESSION: Patchy new opacities at the right mid to lower lung that  could represent pneumonia. Stable small opacities at the left mid  to  low lung. Stable elevated left hemidiaphragm. Old healed right rib  fractures. Stable normal cardiac silhouette.

## 2021-06-13 NOTE — PLAN OF CARE
DATE & TIME: 6/13/21 4175-4534  Cognitive Concerns/ Orientation : A&Ox4, forgetful at times.   BEHAVIOR & AGGRESSION TOOL COLOR: Green  ABNL VS/O2:.VSS except low grade temp of 100 this morning.On RA.   MOBILITY: Ax1 /GB/walker, up in chair for meals.  PAIN MANAGMENT: Denies pain all shift  DIET: Regular diet, Fluid restriction 2000ml  BOWEL/BLADDER: uses urinal  ABNL LAB/BG: Na 130, Cr 2.11, BUN 33, HgB 8.0. incontinent to urine at times  DRAIN/DEVICES: PIV access x1 SL Right  TELEMETRY RHYTHM: NA  SKIN: Scattered scabbing/bruising, band aid paracentesis site on Rt side abd, CDI. Small open wound on Rt elbow- dressing CDI.   TESTS/PROCEDURES: Possible TIPS procedure on Monday 6/14    D/C DATE: TBD  OTHER IMPORTANT INFORMATION:  Nicotine patch on R arm. PT/OT/GI following. On strict I&O.  Palliative team consulted. CXR done this morning and UA sent. Will continue to monitor.

## 2021-06-13 NOTE — PROGRESS NOTES
"BRIEF NUTRITION ASSESSMENT      REASON FOR ASSESSMENT:  Jim Richard is a 66 year old male seen by Registered Dietitian for [facility]    NUTRITION HISTORY:  Patient well known to nutrition services from multiple admissions  He lives alone and provides meals for self, usually eats 2x per day  Typically eats better in the hospital than he does at home given ETOH use and worsening mobility issues  Today, he denies any recent changes or concerns PTA in regards to appetite or food intake    It is noted that he left [facility] [place] [facility] ~2 months ago and has been drinking ~1 pt of vodka per day   Also noted that he receives paracentesis about every 2 weeks  Accurate wt trending difficult to determine, although patient denies true body wt lose     CURRENT DIET AND INTAKE:  Diet:  Regular + 2000 mL fluid restriction     Patient is consuming % of adequately sized meals TID   Today he is request some Ensure supplements (chocolate) BID  Potential TIPS procedure this week/Monday    ANTHROPOMETRICS:  Height: 5' 7\"  Weight:  170 lbs 9 oz  Body mass index is 26.71 kg/m .   Weight Status: Overweight BMI 25-29.9  IBW:  67.3 kg   %IBW: 115%  Weight History: Fluctuating with fluid status - denies true weight loss. Appears able over the past 4 months at least   Wt Readings from Last 10 Encounters:   06/12/21 77.4 kg (170 lb 9 oz)   05/07/21 86.2 kg (190 lb)   05/01/21 86.2 kg (190 lb)   03/26/21 86.2 kg (190 lb)   03/16/21 78.4 kg (172 lb 12.8 oz)   03/10/21 86.2 kg (190 lb)   02/13/21 79.5 kg (175 lb 4.3 oz)   02/03/21 86.2 kg (190 lb)   01/26/21 82.9 kg (182 lb 12.2 oz)   01/15/21 88.8 kg (195 lb 11.2 oz)       LABS:  Labs noted  Na 130 (L)  Recent Labs   Lab 06/13/21  0849 06/12/21  1002 06/11/21  1110 06/10/21  1115 06/09/21  1028 06/08/21  0824   GLC 95 110* 121* 107* 133* 98       MALNUTRITION:  Visual Nutrition Focused Physical Assessment (NFPA) completed  Patient does not meet two of the following criteria " necessary for diagnosing malnutrition at this time    NUTRITION INTERVENTION:  Nutrition Diagnosis:  No nutrition diagnosis at this time.    Implementation:  Nutrition Education:  Per Provider order if indicated  Medical Food Supplement: 4oz Ensure BID per request     FOLLOW UP/MONITORING:   Will re-evaluate in 7 - 10 days, or sooner, if re-consulted.      Susana Cowart RD, LD  Clinical Dietitian

## 2021-06-13 NOTE — PLAN OF CARE
DATE & TIME: 6/12/21 8704-3934  Cognitive Concerns/ Orientation : A&Ox4, forgetful at times.   BEHAVIOR & AGGRESSION TOOL COLOR: Green  ABNL VS/O2: VSS on RA, slight temp 99.0  MOBILITY: Ax1 GB/walker, up in chair for meals.  PAIN MANAGMENT: Denies pain  DIET: Regular diet, Fluid restriction 2000ml  BOWEL/BLADDER: Leger patent with good output  ABNL LAB/BG: Na 131, Cr 2.28, HgB 8.0  DRAIN/DEVICES: PIV access x1 SL  TELEMETRY RHYTHM: NA  SKIN: Scattered scabbing/bruising, band aid paracentesis site on Rt side abd, CDI. Small open wound on Rt elbow- dressing CDI.   TESTS/PROCEDURES: Possible TIPS procedure on Monday.    D/C DATE: TBD  OTHER IMPORTANT INFORMATION:  Nicotine patch on L arm. GI following.

## 2021-06-13 NOTE — PLAN OF CARE
DATE & TIME: 6/13/21 :   Cognitive Concerns/ Orientation : A&Ox4, forgetful at times-easily re-oriented.   BEHAVIOR & AGGRESSION TOOL COLOR: Green  ABNL VS/O2: VSS ex elevated , on RA sat 90s  MOBILITY: to bathroom with one assist/GB/walker  PAIN MANAGMENT: Denies pain  DIET: Regular diet, Fluid restriction 2000ml  BOWEL/BLADDER: incontinent at times. Voids adequate UOP  ABNL LAB/BG: Na 131, Cr 2.28, HgB 8.0  DRAIN/DEVICES: PIV access x1 SL  TELEMETRY RHYTHM: N/A  SKIN: Scattered scabbing/ ecchymotic/bruising, paracentesis site band aid CDI. Right elbow- dressing CDI.   TESTS/PROCEDURES: Plans for possible TIPS procedure on tomorrow Monday.    D/C DATE: Pending improvement  OTHER IMPORTANT INFORMATION:

## 2021-06-14 ENCOUNTER — APPOINTMENT (OUTPATIENT)
Dept: PHYSICAL THERAPY | Facility: CLINIC | Age: 66
DRG: 441 | End: 2021-06-14
Payer: MEDICARE

## 2021-06-14 PROCEDURE — 97530 THERAPEUTIC ACTIVITIES: CPT | Mod: GP | Performed by: PHYSICAL THERAPIST

## 2021-06-14 PROCEDURE — 97116 GAIT TRAINING THERAPY: CPT | Mod: GP | Performed by: PHYSICAL THERAPIST

## 2021-06-14 PROCEDURE — 250N000013 HC RX MED GY IP 250 OP 250 PS 637: Performed by: INTERNAL MEDICINE

## 2021-06-14 PROCEDURE — 99232 SBSQ HOSP IP/OBS MODERATE 35: CPT | Performed by: INTERNAL MEDICINE

## 2021-06-14 PROCEDURE — 120N000001 HC R&B MED SURG/OB

## 2021-06-14 PROCEDURE — 250N000011 HC RX IP 250 OP 636: Performed by: INTERNAL MEDICINE

## 2021-06-14 RX ADMIN — TAMSULOSIN HYDROCHLORIDE 0.8 MG: 0.4 CAPSULE ORAL at 08:46

## 2021-06-14 RX ADMIN — PIPERACILLIN SODIUM AND TAZOBACTAM SODIUM 2.25 G: 2; .25 INJECTION, POWDER, LYOPHILIZED, FOR SOLUTION INTRAVENOUS at 18:20

## 2021-06-14 RX ADMIN — PIPERACILLIN SODIUM AND TAZOBACTAM SODIUM 2.25 G: 2; .25 INJECTION, POWDER, LYOPHILIZED, FOR SOLUTION INTRAVENOUS at 13:06

## 2021-06-14 RX ADMIN — PIPERACILLIN SODIUM AND TAZOBACTAM SODIUM 2.25 G: 2; .25 INJECTION, POWDER, LYOPHILIZED, FOR SOLUTION INTRAVENOUS at 06:47

## 2021-06-14 RX ADMIN — TRAZODONE HYDROCHLORIDE 100 MG: 100 TABLET ORAL at 22:17

## 2021-06-14 RX ADMIN — CARBIDOPA AND LEVODOPA 2.5 MG: 50; 200 TABLET, EXTENDED RELEASE ORAL at 18:20

## 2021-06-14 RX ADMIN — VORTIOXETINE 10 MG: 10 TABLET, FILM COATED ORAL at 08:46

## 2021-06-14 RX ADMIN — QUETIAPINE FUMARATE 50 MG: 25 TABLET ORAL at 16:33

## 2021-06-14 RX ADMIN — PIPERACILLIN SODIUM AND TAZOBACTAM SODIUM 2.25 G: 2; .25 INJECTION, POWDER, LYOPHILIZED, FOR SOLUTION INTRAVENOUS at 01:23

## 2021-06-14 RX ADMIN — GABAPENTIN 300 MG: 300 CAPSULE ORAL at 08:45

## 2021-06-14 RX ADMIN — PANTOPRAZOLE SODIUM 40 MG: 40 TABLET, DELAYED RELEASE ORAL at 08:45

## 2021-06-14 RX ADMIN — CARBIDOPA AND LEVODOPA 2.5 MG: 50; 200 TABLET, EXTENDED RELEASE ORAL at 08:46

## 2021-06-14 RX ADMIN — MULTIPLE VITAMINS W/ MINERALS TAB 1 TABLET: TAB at 08:45

## 2021-06-14 RX ADMIN — QUETIAPINE FUMARATE 200 MG: 100 TABLET ORAL at 20:14

## 2021-06-14 RX ADMIN — THIAMINE HCL TAB 100 MG 100 MG: 100 TAB at 08:45

## 2021-06-14 RX ADMIN — CARBIDOPA AND LEVODOPA 2.5 MG: 50; 200 TABLET, EXTENDED RELEASE ORAL at 13:07

## 2021-06-14 RX ADMIN — OXYCODONE HYDROCHLORIDE 10 MG: 5 TABLET ORAL at 02:04

## 2021-06-14 RX ADMIN — GABAPENTIN 300 MG: 300 CAPSULE ORAL at 16:33

## 2021-06-14 RX ADMIN — MIRTAZAPINE 30 MG: 15 TABLET, FILM COATED ORAL at 22:17

## 2021-06-14 RX ADMIN — PANTOPRAZOLE SODIUM 40 MG: 40 TABLET, DELAYED RELEASE ORAL at 20:14

## 2021-06-14 RX ADMIN — GABAPENTIN 300 MG: 300 CAPSULE ORAL at 22:17

## 2021-06-14 RX ADMIN — VORTIOXETINE 10 MG: 10 TABLET, FILM COATED ORAL at 20:15

## 2021-06-14 RX ADMIN — OXYCODONE HYDROCHLORIDE 10 MG: 5 TABLET ORAL at 08:45

## 2021-06-14 RX ADMIN — FOLIC ACID 1 MG: 1 TABLET ORAL at 08:46

## 2021-06-14 ASSESSMENT — ACTIVITIES OF DAILY LIVING (ADL)
ADLS_ACUITY_SCORE: 15
ADLS_ACUITY_SCORE: 15
ADLS_ACUITY_SCORE: 14
ADLS_ACUITY_SCORE: 14
ADLS_ACUITY_SCORE: 15
ADLS_ACUITY_SCORE: 15

## 2021-06-14 ASSESSMENT — MIFFLIN-ST. JEOR: SCORE: 1539.67

## 2021-06-14 NOTE — PROGRESS NOTES
[facility]    Medicine Progress Note - Hospitalist Service       Date of Admission:  6/6/2021  Assessment & Plan       Jim Richard is a 66 year old male with past medical history significant for persistent alcohol abuse, alcoholic cirrhosis, recurrent ascites requiring weekly to biweekly paracentesis, COPD, CAD, esophageal varices, essential hypertension, peripheral vascular disease, pancytopenia, chronic kidney disease or chronic thrombocytopenia and anemia presented to the emergency room on June 5th due to the concern for new and acute abdominal pain with distention associated with ascites.  Patient admitted drinking a pint of vodka the night before.     Acute generalized abdominal pain: Concern for possible spontaneous bacterial peritonitis.  Acute renal failure on top of chronic kidney disease; low urine output, patient does not seem to be on any diuretics at home, denying following up with gastroenterology as an outpatient, at this time there is also concern for development of hepatorenal syndrome.  Recurrent large-volume ascites due to alcoholic liver disease: According to patient, he is requiring paracentesis every 2 weeks and usually requires anywhere from 5 to 7 L of fluid removal.  According to patient, he underwent fluid removal in the emergency department where couple of liters of fluid was removed.  Initial fluid analysis showing only 10 WBC count does not seem to be concerning for SBP.  06/07/21:Status post paracentesis , 5500 ml yellow fluid was removed , in addition to couple of liters removed in ED  Alcoholic liver cirrhosis; unfortunately patient continues to consume alcohol and admits consuming 1 pint of vodka the night before coming to the hospital.  Discussed with him regarding my concern for significantly associated morbidity and mortality with ongoing alcohol consumption  End-stage liver disease  Pancytopenia associated with liver cirrhosis  Acute on chronic  anemia: Patient is denying any signs and symptoms of bleeding, hemoglobin was down to 7.6.     --Continue patient on current dose of midodrine and pantoprazole.  --continue 2000 mL fluid restriction, tolerating it well.  --During the hospitalization patient was also seen by palliative team and at this time would like to continue with restorative care and would like to proceed with TIPS procedure.  --His abdomen seems to be distended but soft.  -- GI considering TIPS, ? timing  --Usually patient requires albumin before paracentesis and then couple of doses after to avoid hypotension and worsening of renal function.  --Patient has also required 1 unit of packed red blood cell transfusion during the hospitalization, since then his hemoglobin is a stable and his lightheadedness is slightly improved.  --Patient was also treated with preemptive course of ceftriaxone for 5 days for possibility of SBP as he presented with significant abdominal pain, his fluid was negative for any signs of infection, fluid analysis was repeated with his last paracentesis done on Friday.  --Occult blood has been checked during the hospitalization which came back negative.  --Patient will need close follow-up with gastroenterology and hepatologist as an outpatient.  --Patient will also benefit from a structured long-term living to avoid relapse.     Suspected Aspiration pneumonia  C/o coughing, shortness of breath on exertion and also had low grade fever. For the past couple of days patient has a started to noticed persistent cough, and has been noticed to have temperature of 100  F once or twice a day despite being on ceftriaxone.  -- UA negative for UTI. CXR  showing new right-sided middle lobe infiltrates possibly concerning for aspiration PNA  - continue with Zosyn for aspiration pneumonia.  --Continue to monitor patient closely.     Acute renal failure on top of CKD stage III-IV: His creatinine at baseline seems to be close to 1.6, now  creatinine seems to be persistently close to 2 despite multiple paracentesis, albumin transfusion.  Type 2 hepatorenal syndrome:       Treatment as above for hepatorenal syndrome type II.  Jiang catheter has been removed, patient has very poor urine output, discussed with bedside nursing to monitor for urinary retention.  Gastroenterology is planning tips procedure, ? timing  Diuretics are completely avoided due to the elevated creatinine but unfortunately no significant improvement has been noticed.  Continue 2 L fluid restriction.  Check BMP in AM, cr appears stable over the last 2 to 3 days  Monitor sodium levels closely, patient does not want to be on a low-salt diet but usually does not order any high salt food.     Right Umbilical hernia :  -- Have painful right lower abdominal hernia , not able to be reduced , patient wants to know if any thing can be done about it , discussed with him that at this point I don't think surgery will proceed with any procedure right away due to co morbidities and concern for type 2 hepatorenal syndrome   -- Patient was seen by surgery , high risk for complications , not a good candidate to go for surgery, no signs for hernia incarceration      Acute urinary retention  History of benign prostate hyperplasia  --Patient was retaining more than 800 mL ,also have acute renal failure, was unable to pass any urine, Jiang catheter was placed   --Strict intake and output.  --Continue patient on Flomax 0.8 mg p.o. daily.  -- jiang has been removed and so far tolerating     History of toxic encephalopathy from alcohol intoxication: Currently no signs and symptoms of encephalopathy this morning, seems to be able to answer all the questions.  Patient remains at very high risk for withdrawal though no signs seen thus far.     -- Continue CIWA with Ativan, so far no signs of alcohol withdrawal, currently off CIWA  -- multivitamin.  -- Continue patient on thiamine and folic acid.  -- Patient  will not be started at this time on any clonidine due to hypotension  --We will continue patient on PTA dose of gabapentin 300 mg p.o. 3 times a day but will not give patient high dose of tapering Neurontin at this point.  -- GI is checking ammonia as above, patient does not have currently any signs and symptoms for encephalopathy      Essential hypertension  -- Patient does not seem to be taking any antihypertensive medication, continue to monitor.     COPD  -- Continue PTA Breo Ellipta 200/25 1 puff daily as needed.     Obstructive sleep apnea  --Continue CPAP     Chronic depression and anxiety  --Continue PTA Vortioxetine ,mirtazapine and trazodone and Seroquel       Diet: Regular Diet Adult  Fluid restriction 2000 ML FLUID  Snacks/Supplements Adult: Ensure Enlive; Between Meals    DVT Prophylaxis: Pneumatic Compression Devices  Leger Catheter: not present  Code Status: No CPR- Do NOT Intubate           Disposition Plan   Expected discharge: 2 - 3 days, recommended to prior living arrangement once pending treatment of pneumonia, further plans from GI regarding TIPS.  Entered: Chely Calhoun MD 06/14/2021, 9:54 AM       The patient's care was discussed with the Bedside Nurse and Patient.    Chely Calhoun MD  Hospitalist Service  [facility]  Contact information available via [facility] Paging/Directory    ______________________________________________________________________    Interval History   Patient reports still coughing. Denies n/v. Appetite is good Feels abdomen is distended.     Data reviewed today: I reviewed all medications, new labs and imaging results over the last 24 hours. I personally reviewed no images or EKG's today.    Physical Exam   Vital Signs: Temp: 98.9  F (37.2  C) Temp src: Oral BP: 116/59 Pulse: 78   Resp: 16 SpO2: 95 % O2 Device: None (Room air)    Weight: 177 lbs 11.2 oz  General Appearance: Alert, awake and no apparent distress  Respiratory: clear to  auscultation bilaterally, no wheezing  Cardiovascular: regular rate and rhythm, trace LE edema  GI: soft, distended  Skin: warm and dry, bruises noted in UE      Data   Recent Labs   Lab 06/13/21  0849 06/12/21  1002 06/11/21  1110 06/10/21  1115 06/09/21  1028   WBC 4.4  --   --   --  5.1   HGB 8.0* 8.0*  --  8.1* 7.2*   MCV 94  --   --   --  98     --   --   --  95*   * 131* 127* 131* 133   POTASSIUM 3.9 4.1 4.3 4.2 4.0   CHLORIDE 104 105 101 104 106   CO2 16* 18* 17* 20 22   BUN 33* 31* 31* 28 26   CR 2.11* 2.28* 2.15* 2.16* 2.20*   ANIONGAP 10 8 9 7 5   KEV 8.5 8.2* 8.1* 8.0* 7.7*   GLC 95 110* 121* 107* 133*   ALBUMIN  --   --  2.9* 3.1* 2.3*   PROTTOTAL  --   --  6.1* 6.3* 5.5*   BILITOTAL  --   --  0.6 0.6 0.4   ALKPHOS  --   --  166* 156* 144   ALT  --   --  18 17 13   AST  --   --  30 30 18     No results found for this or any previous visit (from the past 24 hour(s)).  Medications     - MEDICATION INSTRUCTIONS -       - MEDICATION INSTRUCTIONS -         folic acid  1 mg Oral Daily     gabapentin  300 mg Oral TID     midodrine  2.5 mg Oral TID     mirtazapine  30 mg Oral At Bedtime     multivitamin w/minerals  1 tablet Oral Daily     nicotine   Transdermal Q8H     pantoprazole  40 mg Oral BID     piperacillin-tazobactam  2.25 g Intravenous Q6H     QUEtiapine  200 mg Oral At Bedtime     sodium chloride (PF)  3 mL Intracatheter Q8H     tamsulosin  0.8 mg Oral Daily     vitamin B1  100 mg Oral Daily     traZODone  100 mg Oral At Bedtime     vortioxetine  10 mg Oral BID

## 2021-06-14 NOTE — PROGRESS NOTES
[facility]  Gastroenterology Progress Note     Jim Richard MRN# [id number]   YOB: 1955 Age: 66 year old          Assessment and Plan:    Jim Richard is a pleasant 66 year old male with alcoholic liver cirrhosis complicated with esophageal varices, abdominal ascites, hepatorenal syndrome, pancytopenia and c/o abdominal pain. GI consulted on 6/7/21.     Active Problems:  Alcoholic liver cirrhosis  Elevated Creatinine- hepatorenal syndrome  Abdominal pain  Abdominal ascites- concern for SBP  Hypoalbuminemia  Pancytopenia  Patient has suffered from chronic alcoholism and developed significant complications with decompensated liver cirrhosis. He has required paracentesis every 2 weeks. Has had elevating creatinine- currently 2.11 (05/21 1.58) He has type 2 hepatorenal syndrome. Has pancytopenia due to bone marrow suppression.    Platelets 159  Hemoglobin 8.0 and stable with negative occult blood  MELD score 16  Last paracentesis 6/10 removing 2.5 L  Discussed TIPS procedure and patient aware of risks and benefits.  Febrile with Tmax at 101.3- newly diagnosed with possible aspiration pneumonia and being treated with zosyn     -- Continue midodrine and pantoprazole  -- Will need to consult IR for TIPS procedure-planned to today but being treated for pneumonia which may be contributing to decompensation of liver cirrhosis and significant ascites.  -- Monitor CBC and cmp  -- Regular diet and encourage good nutrition                Interval History:   no new complaints, denies chest pain, denies shortness of breath, alert, oriented to person, place and time and has mild abdominal pain              Review of Systems:   C: NEGATIVE for fever, chills, change in weight  E/M: NEGATIVE for ear, mouth and throat problems  R: NEGATIVE for significant cough or SOB  CV: NEGATIVE for chest pain, palpitations or peripheral edema             Medications:   I have reviewed this patient's  current medications    folic acid  1 mg Oral Daily     gabapentin  300 mg Oral TID     midodrine  2.5 mg Oral TID     mirtazapine  30 mg Oral At Bedtime     multivitamin w/minerals  1 tablet Oral Daily     nicotine   Transdermal Q8H     pantoprazole  40 mg Oral BID     piperacillin-tazobactam  2.25 g Intravenous Q6H     QUEtiapine  200 mg Oral At Bedtime     sodium chloride (PF)  3 mL Intracatheter Q8H     tamsulosin  0.8 mg Oral Daily     vitamin B1  100 mg Oral Daily     traZODone  100 mg Oral At Bedtime     vortioxetine  10 mg Oral BID                  Physical Exam:   Vitals were reviewed  Vital Signs with Ranges  Temp:  [98.6  F (37  C)-98.9  F (37.2  C)] 98.9  F (37.2  C)  Pulse:  [76-79] 78  Resp:  [18-20] 20  BP: (116-140)/(59-68) 116/59  SpO2:  [95 %-96 %] 95 %  I/O last 3 completed shifts:  In: 960 [P.O.:960]  Out: 750 [Urine:750]  Constitutional: healthy, alert and no distress   Cardiovascular: negative, PMI normal. No lifts, heaves, or thrills. RRR. No murmurs, clicks gallops or rub  Respiratory: negative, Percussion normal. Good diaphragmatic excursion. Lungs clear  Abdomen: Abdomen soft, tender. Distdended. BS normal. No masses, organomegaly           Data:   I reviewed the patient's new clinical lab test results.   Recent Labs   Lab Test 06/13/21  0849 06/12/21  1002 06/10/21  1115 06/09/21  1028 06/07/21  0839 06/07/21  0839 06/06/21  0600 05/01/21  1231 03/26/21  2131 03/26/21  2131   WBC 4.4  --   --  5.1  --  3.7* 3.6* 7.6  --  4.8   HGB 8.0* 8.0* 8.1* 7.2*   < > 7.8* 7.4* 8.0*  --  8.3*   MCV 94  --   --  98  --  100 100 94  --  97     --   --  95*  --  109* 111* 208   < > 162   INR  --   --   --   --   --   --  1.31* 1.20*  --  1.24*    < > = values in this interval not displayed.     Recent Labs   Lab Test 06/13/21  0849 06/12/21  1002 06/11/21  1110   POTASSIUM 3.9 4.1 4.3   CHLORIDE 104 105 101   CO2 16* 18* 17*   BUN 33* 31* 31*   ANIONGAP 10 8 9     Recent Labs   Lab Test  06/13/21  1150 06/11/21  1110 06/10/21  1115 06/09/21  1028 06/06/21  1023 06/06/21  1023 03/26/21  2131 03/26/21  2131 03/13/21  0946 03/13/21  0946 03/11/21  0636 03/10/21  2252   ALBUMIN  --  2.9* 3.1* 2.3*   < >  --    < > 2.7*   < > 2.9*  --  3.1*   BILITOTAL  --  0.6 0.6 0.4   < >  --    < > 2.2*   < > 1.3  --  1.8*   ALT  --  18 17 13   < >  --    < > 26   < > 37  --  39   AST  --  30 30 18   < >  --    < > 36   < > 71*  --  89*   PROTEIN Negative  --   --   --   --  20*  --   --   --   --  70*  --    LIPASE  --   --   --   --   --   --   --  444*  --  490*  --  703*    < > = values in this interval not displayed.       I reviewed the patient's new imaging results.    All laboratory data reviewed  All imaging studies reviewed by me.    Gloria De Leon PA-C,  6/14/2021  [facility]  Office : [phone]  Cell: [phone] (Dr. Shaw)  Cell: [phone] (Gloria De Leon PA-C)

## 2021-06-14 NOTE — PLAN OF CARE
DATE & TIME: 6/13/21 9739-3856  Cognitive Concerns/ Orientation : A&Ox4, forgetful at times.   BEHAVIOR & AGGRESSION TOOL COLOR: Green  ABNL VS/O2: VSS on RA   MOBILITY: Ax1 /GB/walker, up in chair for meals.  PAIN MANAGMENT: Denies pain  DIET: Regular diet, Fluid restriction 2000ml  BOWEL/BLADDER: uses urinal, up to bathroom for BM- 1 BM this shift  ABNL LAB/BG: Na 130, Cr 2.11, BUN 33, HgB 8.0.   DRAIN/DEVICES: PIV access x1 SL   TELEMETRY RHYTHM: NA  SKIN: Scattered scabbing/bruising, band aid paracentesis site on Rt side abd, CDI. Small open wound on Rt elbow- dressing CDI.   TESTS/PROCEDURES: Possible TIPS procedure on Monday 6/14    D/C DATE: TBD  OTHER IMPORTANT INFORMATION:  Nicotine patch on R arm. PT/OT/GI following. On strict I&O.  Palliative team consulted.

## 2021-06-14 NOTE — PLAN OF CARE
DATE & TIME: 6/13/21-6/14/21 5213-7016  Cognitive Concerns/ Orientation : A&Ox4, forgetful at times.   BEHAVIOR & AGGRESSION TOOL COLOR: Green  ABNL VS/O2: VSS on RA   MOBILITY: Ax1 /GB/walker, up to BR this shift  PAIN MANAGMENT: C/o R abd and flank pain, oxycodone given x1  DIET: Regular diet, Fluid restriction 2000ml  BOWEL/BLADDER: Up to BR x2 this shift, strict Is and Os. Uses bedside urinal as well  ABNL LAB/BG: NA  DRAIN/DEVICES: PIV access x1 SL with intermittent ABX  TELEMETRY RHYTHM: NA  SKIN: Scattered scabbing/bruising, band aid paracentesis site on Rt side abd, CDI. Small open wound on Rt elbow- dressing CDI.   TESTS/PROCEDURES: Possible TIPS procedure on today 6/14   D/C DATE: TBD  OTHER IMPORTANT INFORMATION:  Nicotine patch on R arm. PT/OT/GI following. On strict I&O.  Palliative team consulted.

## 2021-06-14 NOTE — PLAN OF CARE
Cognitive Concerns/ Orientation : A&Ox4, forgetful at times.   BEHAVIOR & AGGRESSION TOOL COLOR: Green  ABNL VS/O2: VSS on RA   MOBILITY: Ax1 /GB/walker, up to BR this shift  PAIN MANAGMENT: C/o R abd pain, oxycodone given x1  DIET: Regular diet, Fluid restriction 2000ml  BOWEL/BLADDER: Up to BR x2 this shift Uses urinal.   ABNL LAB/BG: NA 6/13/21 BCs 6/13/21 NGTD  DRAIN/DEVICES: PIV SL with Zosyn.  TELEMETRY RHYTHM: NA  SKIN: Scattered scabbing/bruising, band aid paracentesis site on Rt side abd, CDI. Small open wound on R elbow-dressing CDI.   TESTS/PROCEDURES: none  D/C DATE: TBD

## 2021-06-14 NOTE — PLAN OF CARE
OT: attempted, pt declined despite encouragement 2' fatigue. Assisted pt back to bed, alarm activated.

## 2021-06-15 ENCOUNTER — APPOINTMENT (OUTPATIENT)
Dept: PHYSICAL THERAPY | Facility: CLINIC | Age: 66
DRG: 441 | End: 2021-06-15
Payer: MEDICARE

## 2021-06-15 LAB
ANION GAP SERPL CALCULATED.3IONS-SCNC: 10 MMOL/L (ref 3–14)
BUN SERPL-MCNC: 33 MG/DL (ref 7–30)
CALCIUM SERPL-MCNC: 8.3 MG/DL (ref 8.5–10.1)
CHLORIDE SERPL-SCNC: 108 MMOL/L (ref 94–109)
CO2 SERPL-SCNC: 18 MMOL/L (ref 20–32)
CREAT SERPL-MCNC: 2.29 MG/DL (ref 0.66–1.25)
ERYTHROCYTE [DISTWIDTH] IN BLOOD BY AUTOMATED COUNT: 17.6 % (ref 10–15)
GFR SERPL CREATININE-BSD FRML MDRD: 28 ML/MIN/{1.73_M2}
GLUCOSE SERPL-MCNC: 108 MG/DL (ref 70–99)
HCT VFR BLD AUTO: 25.7 % (ref 40–53)
HGB BLD-MCNC: 8.1 G/DL (ref 13.3–17.7)
MCH RBC QN AUTO: 29.5 PG (ref 26.5–33)
MCHC RBC AUTO-ENTMCNC: 31.5 G/DL (ref 31.5–36.5)
MCV RBC AUTO: 94 FL (ref 78–100)
PLATELET # BLD AUTO: 174 10E9/L (ref 150–450)
POTASSIUM SERPL-SCNC: 3.7 MMOL/L (ref 3.4–5.3)
RBC # BLD AUTO: 2.75 10E12/L (ref 4.4–5.9)
SODIUM SERPL-SCNC: 136 MMOL/L (ref 133–144)
WBC # BLD AUTO: 3.6 10E9/L (ref 4–11)

## 2021-06-15 PROCEDURE — 97116 GAIT TRAINING THERAPY: CPT | Mod: GP | Performed by: PHYSICAL THERAPY ASSISTANT

## 2021-06-15 PROCEDURE — 99232 SBSQ HOSP IP/OBS MODERATE 35: CPT | Performed by: INTERNAL MEDICINE

## 2021-06-15 PROCEDURE — 80048 BASIC METABOLIC PNL TOTAL CA: CPT | Performed by: INTERNAL MEDICINE

## 2021-06-15 PROCEDURE — 85027 COMPLETE CBC AUTOMATED: CPT | Performed by: INTERNAL MEDICINE

## 2021-06-15 PROCEDURE — 250N000013 HC RX MED GY IP 250 OP 250 PS 637: Performed by: INTERNAL MEDICINE

## 2021-06-15 PROCEDURE — 36415 COLL VENOUS BLD VENIPUNCTURE: CPT | Performed by: INTERNAL MEDICINE

## 2021-06-15 PROCEDURE — 120N000001 HC R&B MED SURG/OB

## 2021-06-15 PROCEDURE — 250N000011 HC RX IP 250 OP 636: Performed by: INTERNAL MEDICINE

## 2021-06-15 RX ADMIN — PANTOPRAZOLE SODIUM 40 MG: 40 TABLET, DELAYED RELEASE ORAL at 21:44

## 2021-06-15 RX ADMIN — PIPERACILLIN SODIUM AND TAZOBACTAM SODIUM 2.25 G: 2; .25 INJECTION, POWDER, LYOPHILIZED, FOR SOLUTION INTRAVENOUS at 06:57

## 2021-06-15 RX ADMIN — VORTIOXETINE 10 MG: 10 TABLET, FILM COATED ORAL at 21:44

## 2021-06-15 RX ADMIN — THIAMINE HCL TAB 100 MG 100 MG: 100 TAB at 09:04

## 2021-06-15 RX ADMIN — QUETIAPINE FUMARATE 50 MG: 25 TABLET ORAL at 09:04

## 2021-06-15 RX ADMIN — MIRTAZAPINE 30 MG: 15 TABLET, FILM COATED ORAL at 21:44

## 2021-06-15 RX ADMIN — FOLIC ACID 1 MG: 1 TABLET ORAL at 09:05

## 2021-06-15 RX ADMIN — GABAPENTIN 300 MG: 300 CAPSULE ORAL at 21:44

## 2021-06-15 RX ADMIN — CARBIDOPA AND LEVODOPA 2.5 MG: 50; 200 TABLET, EXTENDED RELEASE ORAL at 18:55

## 2021-06-15 RX ADMIN — GABAPENTIN 300 MG: 300 CAPSULE ORAL at 09:06

## 2021-06-15 RX ADMIN — VORTIOXETINE 10 MG: 10 TABLET, FILM COATED ORAL at 09:05

## 2021-06-15 RX ADMIN — TAMSULOSIN HYDROCHLORIDE 0.8 MG: 0.4 CAPSULE ORAL at 09:05

## 2021-06-15 RX ADMIN — PANTOPRAZOLE SODIUM 40 MG: 40 TABLET, DELAYED RELEASE ORAL at 09:05

## 2021-06-15 RX ADMIN — TRAZODONE HYDROCHLORIDE 100 MG: 100 TABLET ORAL at 21:44

## 2021-06-15 RX ADMIN — NICOTINE 1 PATCH: 21 PATCH, EXTENDED RELEASE TRANSDERMAL at 23:24

## 2021-06-15 RX ADMIN — OXYCODONE HYDROCHLORIDE 10 MG: 5 TABLET ORAL at 23:24

## 2021-06-15 RX ADMIN — GABAPENTIN 300 MG: 300 CAPSULE ORAL at 16:45

## 2021-06-15 RX ADMIN — CARBIDOPA AND LEVODOPA 2.5 MG: 50; 200 TABLET, EXTENDED RELEASE ORAL at 14:00

## 2021-06-15 RX ADMIN — PIPERACILLIN SODIUM AND TAZOBACTAM SODIUM 2.25 G: 2; .25 INJECTION, POWDER, LYOPHILIZED, FOR SOLUTION INTRAVENOUS at 01:26

## 2021-06-15 RX ADMIN — PIPERACILLIN SODIUM AND TAZOBACTAM SODIUM 2.25 G: 2; .25 INJECTION, POWDER, LYOPHILIZED, FOR SOLUTION INTRAVENOUS at 14:00

## 2021-06-15 RX ADMIN — PIPERACILLIN SODIUM AND TAZOBACTAM SODIUM 2.25 G: 2; .25 INJECTION, POWDER, LYOPHILIZED, FOR SOLUTION INTRAVENOUS at 18:55

## 2021-06-15 RX ADMIN — OXYCODONE HYDROCHLORIDE 10 MG: 5 TABLET ORAL at 19:46

## 2021-06-15 RX ADMIN — QUETIAPINE FUMARATE 50 MG: 25 TABLET ORAL at 14:00

## 2021-06-15 RX ADMIN — MULTIPLE VITAMINS W/ MINERALS TAB 1 TABLET: TAB at 09:04

## 2021-06-15 RX ADMIN — CARBIDOPA AND LEVODOPA 2.5 MG: 50; 200 TABLET, EXTENDED RELEASE ORAL at 09:04

## 2021-06-15 RX ADMIN — QUETIAPINE FUMARATE 200 MG: 100 TABLET ORAL at 21:44

## 2021-06-15 RX ADMIN — OXYCODONE HYDROCHLORIDE 10 MG: 5 TABLET ORAL at 16:45

## 2021-06-15 ASSESSMENT — ACTIVITIES OF DAILY LIVING (ADL)
ADLS_ACUITY_SCORE: 14
ADLS_ACUITY_SCORE: 13
ADLS_ACUITY_SCORE: 14
ADLS_ACUITY_SCORE: 14

## 2021-06-15 ASSESSMENT — MIFFLIN-ST. JEOR: SCORE: 1619.73

## 2021-06-15 NOTE — PLAN OF CARE
Cognitive Concerns/ Orientation : A&Ox4, forgetful at times.   BEHAVIOR & AGGRESSION TOOL COLOR: Green  ABNL VS/O2: VSS on RA   MOBILITY: Ax1 /GB/walker, up to BR this shift  PAIN MANAGMENT: Denies pain  DIET: Regular diet, Fluid restriction 2000ml  BOWEL/BLADDER: Continent  ABNL LAB/BG: NA  DRAIN/DEVICES: PIV SL with Zosyn.  TELEMETRY RHYTHM: NA  SKIN: Scattered scabbing/bruising, band aid paracentesis site on Rt side abd, CDI. Rt elbow abrasion, scabbed - AGUILA.   TESTS/PROCEDURES: none   D/C DATE: TBD  OTHER IMPORTANT INFORMATION:  Nicotine patch on R arm. PT/OT/GI following. On strict I&O.

## 2021-06-15 NOTE — PROGRESS NOTES
[facility]  Gastroenterology Progress Note     Jim Richard MRN# [id number]   YOB: 1955 Age: 66 year old          Assessment and Plan:   Jim Richard is a pleasant 66 year old male with alcoholic liver cirrhosis complicated with esophageal varices, abdominal ascites, hepatorenal syndrome, pancytopenia and c/o abdominal pain. GI consulted on 6/7/21.     Active Problems:  Alcoholic liver cirrhosis  Elevated Creatinine- hepatorenal syndrome  Abdominal pain  Abdominal ascites- concern for SBP  Hypoalbuminemia  Pancytopenia  Patient has suffered from chronic alcoholism and developed significant complications with decompensated liver cirrhosis. He has required paracentesis every 2 weeks. Has had elevating creatinine- currently 2.11 (05/21 1.58) He has type 2 hepatorenal syndrome. Has pancytopenia due to bone marrow suppression.     Platelets 159  Hemoglobin 8.0 and stable with negative occult blood  MELD score 16  Last paracentesis 6/10 removing 2.5 L  Discussed TIPS procedure and patient aware of risks and benefits.  Febrile with Tmax at 101.3- newly diagnosed with possible aspiration pneumonia and being treated with zosyn     -- Continue midodrine and pantoprazole  -- Monitor CBC and cmp  -- Regular diet and encourage good nutrition  -- Ok with GI to discharge patient and plan outpatient f/u in1-2 weeks.   -- Absolute alcohol abstinence          Acute renal failure, unspecified acute renal failure type (H)  End-stage liver disease (H)      Interval History:   no new complaints, doing well, denies chest pain, denies shortness of breath, denies abdominal pain, alert, oriented to person, place and time and doing well; no cp, sob, n/v/d, or abd pain.              Review of Systems:   C: NEGATIVE for fever, chills, change in weight  E/M: NEGATIVE for ear, mouth and throat problems  R: NEGATIVE for significant cough or SOB  CV: NEGATIVE for chest pain, palpitations or  peripheral edema             Medications:   I have reviewed this patient's current medications    folic acid  1 mg Oral Daily     gabapentin  300 mg Oral TID     midodrine  2.5 mg Oral TID     mirtazapine  30 mg Oral At Bedtime     multivitamin w/minerals  1 tablet Oral Daily     nicotine   Transdermal Q8H     pantoprazole  40 mg Oral BID     piperacillin-tazobactam  2.25 g Intravenous Q6H     QUEtiapine  200 mg Oral At Bedtime     sodium chloride (PF)  3 mL Intracatheter Q8H     tamsulosin  0.8 mg Oral Daily     vitamin B1  100 mg Oral Daily     traZODone  100 mg Oral At Bedtime     vortioxetine  10 mg Oral BID                  Physical Exam:   Vitals were reviewed  Vital Signs with Ranges  Temp:  [98.1  F (36.7  C)-99.2  F (37.3  C)] 98.1  F (36.7  C)  Pulse:  [62-71] 71  Resp:  [14-16] 16  BP: (130-143)/(61-70) 136/70  SpO2:  [96 %-98 %] 96 %  I/O last 3 completed shifts:  In: 240 [P.O.:240]  Out: 750 [Urine:750]  Constitutional: healthy, alert and no distress   Cardiovascular: negative, PMI normal. No lifts, heaves, or thrills. RRR. No murmurs, clicks gallops or rub  Respiratory: negative, Percussion normal. Good diaphragmatic excursion. Lungs clear  Abdomen: Abdomen soft, non-tender. BS normal. No masses, organomegaly, positive findings: distended           Data:   I reviewed the patient's new clinical lab test results.   Recent Labs   Lab Test 06/13/21  0849 06/12/21  1002 06/10/21  1115 06/09/21  1028 06/07/21  0839 06/07/21  0839 06/06/21  0600 05/01/21  1231 03/26/21  2131 03/26/21  2131   WBC 4.4  --   --  5.1  --  3.7* 3.6* 7.6  --  4.8   HGB 8.0* 8.0* 8.1* 7.2*   < > 7.8* 7.4* 8.0*  --  8.3*   MCV 94  --   --  98  --  100 100 94  --  97     --   --  95*  --  109* 111* 208   < > 162   INR  --   --   --   --   --   --  1.31* 1.20*  --  1.24*    < > = values in this interval not displayed.     Recent Labs   Lab Test 06/13/21  0849 06/12/21  1002 06/11/21  1110   POTASSIUM 3.9 4.1 4.3   CHLORIDE 104  105 101   CO2 16* 18* 17*   BUN 33* 31* 31*   ANIONGAP 10 8 9     Recent Labs   Lab Test 06/13/21  1150 06/11/21  1110 06/10/21  1115 06/09/21  1028 06/06/21  1023 06/06/21  1023 03/26/21  2131 03/26/21  2131 03/13/21  0946 03/13/21  0946 03/11/21  0636 03/10/21  2252   ALBUMIN  --  2.9* 3.1* 2.3*   < >  --    < > 2.7*   < > 2.9*  --  3.1*   BILITOTAL  --  0.6 0.6 0.4   < >  --    < > 2.2*   < > 1.3  --  1.8*   ALT  --  18 17 13   < >  --    < > 26   < > 37  --  39   AST  --  30 30 18   < >  --    < > 36   < > 71*  --  89*   PROTEIN Negative  --   --   --   --  20*  --   --   --   --  70*  --    LIPASE  --   --   --   --   --   --   --  444*  --  490*  --  703*    < > = values in this interval not displayed.       I reviewed the patient's new imaging results.    All laboratory data reviewed  All imaging studies reviewed by me.    Gloria De Leon PA-C,  6/15/2021  [facility]  Office : [phone]  Cell: [phone] (Dr. Shaw)  Cell: [phone] (Gloria De Leon PA-C)

## 2021-06-15 NOTE — PROGRESS NOTES
RADIOLOGY PROCEDURE NOTE  Patient name: Jim Richard  MRN: [id number]  : 1955    Pre-procedure diagnosis: Ascites  Post-procedure diagnosis: Same    Procedure Date/Time: 2019  10:07 AM  Procedure: Paracentesis  Estimated blood loss: None  Specimen(s) collected with description: Ascites.  The patient tolerated the procedure well with no immediate complications.    See imaging dictation for procedural details and findings.    Provider name: Sebastien Pereira  Assistant(s):None     [[email or web address]] : December 2018: REEG/ VEEG- normal

## 2021-06-15 NOTE — PROGRESS NOTES
[facility]    Medicine Progress Note - Hospitalist Service       Date of Admission:  6/6/2021  Assessment & Plan       Jim Richard is a 66 year old male with past medical history significant for persistent alcohol abuse, alcoholic cirrhosis, recurrent ascites requiring weekly to biweekly paracentesis, COPD, CAD, esophageal varices, essential hypertension, peripheral vascular disease, pancytopenia, chronic kidney disease or chronic thrombocytopenia and anemia presented to the emergency room on June 5th due to the concern for new and acute abdominal pain with distention associated with ascites.  Patient admitted drinking a pint of vodka the night before.     Acute generalized abdominal pain: Concern for possible spontaneous bacterial peritonitis.  Acute renal failure on top of chronic kidney disease; low urine output, patient does not seem to be on any diuretics at home, denying following up with gastroenterology as an outpatient, at this time there is also concern for development of hepatorenal syndrome.  Recurrent large-volume ascites due to alcoholic liver disease: According to patient, he is requiring paracentesis every 2 weeks and usually requires anywhere from 5 to 7 L of fluid removal.  According to patient, he underwent fluid removal in the emergency department where couple of liters of fluid was removed.  Initial fluid analysis showing only 10 WBC count does not seem to be concerning for SBP.  06/07/21:Status post paracentesis , 5500 ml yellow fluid was removed , in addition to couple of liters removed in ED  Alcoholic liver cirrhosis; unfortunately patient continues to consume alcohol and admits consuming 1 pint of vodka the night before coming to the hospital.  Discussed with him regarding my concern for significantly associated morbidity and mortality with ongoing alcohol consumption  End-stage liver disease  Pancytopenia associated with liver cirrhosis  Acute on chronic  anemia: Patient is denying any signs and symptoms of bleeding, hemoglobin was down to 7.6.     --Continue patient on current dose of midodrine and pantoprazole.  --continue 2000 mL fluid restriction, tolerating it well.  --During the hospitalization patient was also seen by palliative team and at this time would like to continue with restorative care and would like to proceed with TIPS procedure.  --Patient has also required 1 unit of packed red blood cell transfusion during the hospitalization, since then his hemoglobin is a stable and his lightheadedness is slightly improved.  --Patient was also treated with preemptive course of ceftriaxone for 5 days for possibility of SBP as he presented with significant abdominal pain, his fluid was negative for any signs of infection, fluid analysis was repeated with his last paracentesis done on Friday.  -- IPS was considered however, holding off at this time while treating pneumonia  --Occult blood has been checked during the hospitalization which came back negative.  -- will need close follow-up with gastroenterology and hepatologist as an outpatient.  -- abdomen is distended, but soft and no significant pain at this time  -- Discussed with Gloria CARUSO, plan for GI follow up in 1-2 weeks after d ischarge     Suspected Aspiration pneumonia  C/o coughing, shortness of breath on exertion and also had low grade fever. For the past couple of days patient has a started to noticed persistent cough, and has been noticed to have temperature of 100  F once or twice a day despite being on ceftriaxone.  - UA negative for UTI. CXR  showing new right-sided middle lobe infiltrates possibly concerning for aspiration PNA  - continue with Zosyn for aspiration pneumonia, with plan on PO Augmentin at discharge  - cough appears to be improving at this time  - Continue to monitor      Acute renal failure on top of CKD stage III-IV:   His creatinine seem to fluctuate between 1.6 -2 over the last  few months. Was 1.5 in March of 2021, then 1.9 on admission. Now creatinine seems to be persistently close to 2 despite multiple paracentesis, albumin transfusion.  Type 2 hepatorenal syndrome  Treatment as above for hepatorenal syndrome type II.  Diuretics are completely avoided due to the elevated creatinine but unfortunately no significant improvement has been noticed.  - Continue 2 L fluid restriction.  - cr 2.15>2.28>2.11>2.29 today  - Monitor sodium levels closely, patient does not want to be on a low-salt diet but usually does not order any high salt food.     Right Umbilical hernia :  -- Have painful right lower abdominal hernia , not able to be reduced , patient wants to know if any thing can be done about it , discussed with him that at this point I don't think surgery will proceed with any procedure right away due to co morbidities and concern for type 2 hepatorenal syndrome   -- Patient was seen by surgery , high risk for complications , not a good candidate to go for surgery, no signs for hernia incarceration      Acute urinary retention  History of benign prostate hyperplasia  --Patient was retaining more than 800 mL ,also have acute renal failure, was unable to pass any urine, Jiang catheter was placed   --Strict intake and output.  --Continue patient on Flomax 0.8 mg p.o. daily.  -- jiang has been removed and so far tolerating     History of toxic encephalopathy from alcohol intoxication: Currently no signs and symptoms of encephalopathy this morning, seems to be able to answer all the questions.  Patient remains at very high risk for withdrawal though no signs seen thus far.     -- no need for CIWA at this time.   -- multivitamin.  -- Continue patient on thiamine and folic acid.  -- will continue patient on PTA dose of gabapentin 300 mg p.o. 3 times a day but will not give patient high dose of tapering Neurontin at this point.  -- patient does not have currently any signs and symptoms for  encephalopathy      Essential hypertension  -- Patient does not seem to be taking any antihypertensive medication, continue to monitor.     COPD  -- Continue PTA Breo Ellipta 200/25 1 puff daily as needed.     Obstructive sleep apnea  --Continue CPAP     Chronic depression and anxiety  --Continue PTA Vortioxetine ,mirtazapine and trazodone and Seroquel       Diet: Regular Diet Adult  Fluid restriction 2000 ML FLUID  Snacks/Supplements Adult: Ensure Enlive; Between Meals    DVT Prophylaxis: Pneumatic Compression Devices  Leger Catheter: not present  Code Status: No CPR- Do NOT Intubate           Disposition Plan   Expected discharge: in 1 day, recommended to transitional care unit once once TCU bed is available.  Entered: Chely Calhoun MD 06/15/2021, 1:31 PM       The patient's care was discussed with the Bedside Nurse and Patient.    Chely Calhoun MD  Hospitalist Service  [facility]  Contact information available via [facility] Paging/Directory    ______________________________________________________________________    Interval History   Patient states cough is improved. Denies n/v. Tolerating PO intake.     Data reviewed today: I reviewed all medications, new labs and imaging results over the last 24 hours. I personally reviewed no images or EKG's today.    Physical Exam   Vital Signs: Temp: 98.1  F (36.7  C) Temp src: Oral BP: 136/70 Pulse: 71   Resp: 16 SpO2: 96 % O2 Device: None (Room air)    Weight: 195 lbs 5.59 oz  General Appearance: Alert, awake and no apparent distress  Respiratory: clear to auscultation bilaterally, no wheezing  Cardiovascular: regular rate and rhythm, trace LE edema  GI: soft, distended  Skin: warm and dry, bruises noted in UE      Data   Recent Labs   Lab 06/15/21  0857 06/13/21  0849 06/12/21  1002 06/11/21  1110 06/10/21  1115 06/09/21  1028   WBC 3.6* 4.4  --   --   --  5.1   HGB 8.1* 8.0* 8.0*  --  8.1* 7.2*   MCV 94 94  --   --   --  98     159  --   --   --  95*    130* 131* 127* 131* 133   POTASSIUM 3.7 3.9 4.1 4.3 4.2 4.0   CHLORIDE 108 104 105 101 104 106   CO2 18* 16* 18* 17* 20 22   BUN 33* 33* 31* 31* 28 26   CR 2.29* 2.11* 2.28* 2.15* 2.16* 2.20*   ANIONGAP 10 10 8 9 7 5   KEV 8.3* 8.5 8.2* 8.1* 8.0* 7.7*   * 95 110* 121* 107* 133*   ALBUMIN  --   --   --  2.9* 3.1* 2.3*   PROTTOTAL  --   --   --  6.1* 6.3* 5.5*   BILITOTAL  --   --   --  0.6 0.6 0.4   ALKPHOS  --   --   --  166* 156* 144   ALT  --   --   --  18 17 13   AST  --   --   --  30 30 18     No results found for this or any previous visit (from the past 24 hour(s)).  Medications     - MEDICATION INSTRUCTIONS -       - MEDICATION INSTRUCTIONS -         folic acid  1 mg Oral Daily     gabapentin  300 mg Oral TID     midodrine  2.5 mg Oral TID     mirtazapine  30 mg Oral At Bedtime     multivitamin w/minerals  1 tablet Oral Daily     nicotine   Transdermal Q8H     pantoprazole  40 mg Oral BID     piperacillin-tazobactam  2.25 g Intravenous Q6H     QUEtiapine  200 mg Oral At Bedtime     sodium chloride (PF)  3 mL Intracatheter Q8H     tamsulosin  0.8 mg Oral Daily     vitamin B1  100 mg Oral Daily     traZODone  100 mg Oral At Bedtime     vortioxetine  10 mg Oral BID

## 2021-06-16 ENCOUNTER — APPOINTMENT (OUTPATIENT)
Dept: PHYSICAL THERAPY | Facility: CLINIC | Age: 66
DRG: 441 | End: 2021-06-16
Payer: MEDICARE

## 2021-06-16 ENCOUNTER — APPOINTMENT (OUTPATIENT)
Dept: ULTRASOUND IMAGING | Facility: CLINIC | Age: 66
DRG: 441 | End: 2021-06-16
Attending: PHYSICIAN ASSISTANT
Payer: MEDICARE

## 2021-06-16 LAB
ALBUMIN FLD-MCNC: 0.5 G/DL
ALBUMIN SERPL-MCNC: 2.2 G/DL (ref 3.4–5)
AMMONIA PLAS-SCNC: 40 UMOL/L (ref 10–50)
APPEARANCE FLD: NORMAL
COLOR FLD: YELLOW
LYMPHOCYTES NFR FLD MANUAL: 9 %
MONOS+MACROS NFR FLD MANUAL: 83 %
NEUTS BAND NFR FLD MANUAL: 2 %
OTHER CELLS FLD MANUAL: 6 %
PROT FLD-MCNC: 1.4 G/DL
SPECIMEN SOURCE FLD: NORMAL
WBC # FLD AUTO: 303 /UL

## 2021-06-16 PROCEDURE — 120N000001 HC R&B MED SURG/OB

## 2021-06-16 PROCEDURE — 97530 THERAPEUTIC ACTIVITIES: CPT | Mod: GP | Performed by: PHYSICAL THERAPY ASSISTANT

## 2021-06-16 PROCEDURE — 250N000011 HC RX IP 250 OP 636: Performed by: INTERNAL MEDICINE

## 2021-06-16 PROCEDURE — 89051 BODY FLUID CELL COUNT: CPT | Performed by: PHYSICIAN ASSISTANT

## 2021-06-16 PROCEDURE — P9047 ALBUMIN (HUMAN), 25%, 50ML: HCPCS | Performed by: INTERNAL MEDICINE

## 2021-06-16 PROCEDURE — 250N000013 HC RX MED GY IP 250 OP 250 PS 637: Performed by: INTERNAL MEDICINE

## 2021-06-16 PROCEDURE — 272N000706 US PARACENTESIS

## 2021-06-16 PROCEDURE — 99232 SBSQ HOSP IP/OBS MODERATE 35: CPT | Performed by: INTERNAL MEDICINE

## 2021-06-16 PROCEDURE — 0W9G3ZZ DRAINAGE OF PERITONEAL CAVITY, PERCUTANEOUS APPROACH: ICD-10-PCS | Performed by: RADIOLOGY

## 2021-06-16 PROCEDURE — 999N000154 HC STATISTIC RADIOLOGY XRAY, US, CT, MAR, NM

## 2021-06-16 PROCEDURE — 36415 COLL VENOUS BLD VENIPUNCTURE: CPT | Performed by: PHYSICIAN ASSISTANT

## 2021-06-16 PROCEDURE — 84157 ASSAY OF PROTEIN OTHER: CPT | Performed by: PHYSICIAN ASSISTANT

## 2021-06-16 PROCEDURE — 82042 OTHER SOURCE ALBUMIN QUAN EA: CPT | Performed by: PHYSICIAN ASSISTANT

## 2021-06-16 PROCEDURE — 82140 ASSAY OF AMMONIA: CPT | Performed by: INTERNAL MEDICINE

## 2021-06-16 PROCEDURE — 82040 ASSAY OF SERUM ALBUMIN: CPT | Performed by: PHYSICIAN ASSISTANT

## 2021-06-16 PROCEDURE — 36415 COLL VENOUS BLD VENIPUNCTURE: CPT | Performed by: INTERNAL MEDICINE

## 2021-06-16 RX ORDER — LIDOCAINE HYDROCHLORIDE 10 MG/ML
10 INJECTION, SOLUTION EPIDURAL; INFILTRATION; INTRACAUDAL; PERINEURAL ONCE
Status: COMPLETED | OUTPATIENT
Start: 2021-06-16 | End: 2021-06-16

## 2021-06-16 RX ORDER — ALBUMIN (HUMAN) 12.5 G/50ML
12.5 SOLUTION INTRAVENOUS ONCE
Status: COMPLETED | OUTPATIENT
Start: 2021-06-16 | End: 2021-06-16

## 2021-06-16 RX ORDER — ALBUMIN (HUMAN) 12.5 G/50ML
12.5 SOLUTION INTRAVENOUS ONCE
Status: DISCONTINUED | OUTPATIENT
Start: 2021-06-16 | End: 2021-06-17 | Stop reason: HOSPADM

## 2021-06-16 RX ORDER — AMOXICILLIN AND CLAVULANATE POTASSIUM 500; 125 MG/1; MG/1
1 TABLET, FILM COATED ORAL EVERY 12 HOURS SCHEDULED
Status: DISCONTINUED | OUTPATIENT
Start: 2021-06-16 | End: 2021-06-17 | Stop reason: HOSPADM

## 2021-06-16 RX ORDER — ALBUMIN (HUMAN) 12.5 G/50ML
12.5 SOLUTION INTRAVENOUS ONCE
Status: DISCONTINUED | OUTPATIENT
Start: 2021-06-16 | End: 2021-06-16

## 2021-06-16 RX ADMIN — TAMSULOSIN HYDROCHLORIDE 0.8 MG: 0.4 CAPSULE ORAL at 07:48

## 2021-06-16 RX ADMIN — GABAPENTIN 300 MG: 300 CAPSULE ORAL at 07:51

## 2021-06-16 RX ADMIN — VORTIOXETINE 10 MG: 10 TABLET, FILM COATED ORAL at 20:16

## 2021-06-16 RX ADMIN — OXYCODONE HYDROCHLORIDE 10 MG: 5 TABLET ORAL at 07:50

## 2021-06-16 RX ADMIN — QUETIAPINE FUMARATE 50 MG: 25 TABLET ORAL at 11:38

## 2021-06-16 RX ADMIN — PANTOPRAZOLE SODIUM 40 MG: 40 TABLET, DELAYED RELEASE ORAL at 07:51

## 2021-06-16 RX ADMIN — TRAZODONE HYDROCHLORIDE 100 MG: 100 TABLET ORAL at 22:00

## 2021-06-16 RX ADMIN — MIRTAZAPINE 30 MG: 15 TABLET, FILM COATED ORAL at 22:00

## 2021-06-16 RX ADMIN — GABAPENTIN 300 MG: 300 CAPSULE ORAL at 16:09

## 2021-06-16 RX ADMIN — GABAPENTIN 300 MG: 300 CAPSULE ORAL at 22:00

## 2021-06-16 RX ADMIN — ALBUMIN HUMAN 12.5 G: 0.25 SOLUTION INTRAVENOUS at 14:04

## 2021-06-16 RX ADMIN — PIPERACILLIN SODIUM AND TAZOBACTAM SODIUM 2.25 G: 2; .25 INJECTION, POWDER, LYOPHILIZED, FOR SOLUTION INTRAVENOUS at 06:21

## 2021-06-16 RX ADMIN — CARBIDOPA AND LEVODOPA 2.5 MG: 50; 200 TABLET, EXTENDED RELEASE ORAL at 07:49

## 2021-06-16 RX ADMIN — AMOXICILLIN AND CLAVULANATE POTASSIUM 1 TABLET: 500; 125 TABLET, FILM COATED ORAL at 14:03

## 2021-06-16 RX ADMIN — PANTOPRAZOLE SODIUM 40 MG: 40 TABLET, DELAYED RELEASE ORAL at 20:16

## 2021-06-16 RX ADMIN — CARBIDOPA AND LEVODOPA 2.5 MG: 50; 200 TABLET, EXTENDED RELEASE ORAL at 14:03

## 2021-06-16 RX ADMIN — OXYCODONE HYDROCHLORIDE 10 MG: 5 TABLET ORAL at 11:36

## 2021-06-16 RX ADMIN — QUETIAPINE FUMARATE 200 MG: 100 TABLET ORAL at 22:00

## 2021-06-16 RX ADMIN — VORTIOXETINE 10 MG: 10 TABLET, FILM COATED ORAL at 07:54

## 2021-06-16 RX ADMIN — QUETIAPINE FUMARATE 50 MG: 25 TABLET ORAL at 07:49

## 2021-06-16 RX ADMIN — THIAMINE HCL TAB 100 MG 100 MG: 100 TAB at 07:52

## 2021-06-16 RX ADMIN — PIPERACILLIN SODIUM AND TAZOBACTAM SODIUM 2.25 G: 2; .25 INJECTION, POWDER, LYOPHILIZED, FOR SOLUTION INTRAVENOUS at 00:49

## 2021-06-16 RX ADMIN — QUETIAPINE FUMARATE 50 MG: 25 TABLET ORAL at 17:57

## 2021-06-16 RX ADMIN — FOLIC ACID 1 MG: 1 TABLET ORAL at 07:50

## 2021-06-16 RX ADMIN — MULTIPLE VITAMINS W/ MINERALS TAB 1 TABLET: TAB at 07:51

## 2021-06-16 RX ADMIN — LIDOCAINE HYDROCHLORIDE 10 ML: 10 INJECTION, SOLUTION EPIDURAL; INFILTRATION; INTRACAUDAL; PERINEURAL at 13:35

## 2021-06-16 RX ADMIN — CARBIDOPA AND LEVODOPA 2.5 MG: 50; 200 TABLET, EXTENDED RELEASE ORAL at 17:56

## 2021-06-16 RX ADMIN — AMOXICILLIN AND CLAVULANATE POTASSIUM 1 TABLET: 500; 125 TABLET, FILM COATED ORAL at 20:23

## 2021-06-16 ASSESSMENT — ACTIVITIES OF DAILY LIVING (ADL)
ADLS_ACUITY_SCORE: 13
ADLS_ACUITY_SCORE: 13
ADLS_ACUITY_SCORE: 12
ADLS_ACUITY_SCORE: 12
ADLS_ACUITY_SCORE: 13
ADLS_ACUITY_SCORE: 13

## 2021-06-16 NOTE — PLAN OF CARE
Cognitive Concerns/ Orientation : A&Ox4, forgetful at times.   BEHAVIOR & AGGRESSION TOOL COLOR: Green  ABNL VS/O2: VSS on RA   MOBILITY: Ax1 /GB/walker, up to BR this shift  PAIN MANAGMENT: oxy given x1 w/ adequate pain relief  DIET: Regular diet, Fluid restriction 2000ml  BOWEL/BLADDER: Continent  ABNL LAB/BG: NA  DRAIN/DEVICES: PIV SL with Zosyn.  TELEMETRY RHYTHM: NA  SKIN: Scattered scabbing/bruising, band aid paracentesis site on Rt side abd, CDI. Rt elbow abrasion, scabbed - AGUILA.   TESTS/PROCEDURES: none   D/C DATE: TBD- willing to go to TCU  OTHER IMPORTANT INFORMATION:  Nicotine patch on R arm. PT/OT/GI following. On strict I&O.

## 2021-06-16 NOTE — PLAN OF CARE
DATE & TIME: 6/15/21 [id number]  Cognitive Concerns/ Orientation : A&Ox4, forgetful at times.   BEHAVIOR & AGGRESSION TOOL COLOR: Green  ABNL VS/O2: VSS on RA   MOBILITY: Ax1 /GB/W  PAIN MANAGMENT: Denies pain  DIET: Regular diet. Fluid restriction 2000ml  BOWEL/BLADDER: Continent  ABNL LAB/BG: NA  DRAIN/DEVICES: PIV SL.  TELEMETRY RHYTHM: NA  SKIN: Scattered scabbing/bruising, band aid paracentesis site on Rt side abd, CDI. Rt elbow abrasion, scabbed - AGUILA.   TESTS/PROCEDURES: none   D/C DATE: pending TCU placement  OTHER IMPORTANT INFORMATION:  Nicotine patch on R arm. PT/OT/GI following. On strict I&O.

## 2021-06-16 NOTE — PROGRESS NOTES
[facility]    Medicine Progress Note - Hospitalist Service       Date of Admission:  6/6/2021  Assessment & Plan       Jim Richard is a 66 year old male with past medical history significant for persistent alcohol abuse, alcoholic cirrhosis, recurrent ascites requiring weekly to biweekly paracentesis, COPD, CAD, esophageal varices, essential hypertension, peripheral vascular disease, pancytopenia, chronic kidney disease or chronic thrombocytopenia and anemia presented to the emergency room on June 5th due to the concern for new and acute abdominal pain with distention associated with ascites.  Patient admitted drinking a pint of vodka the night before.     Acute generalized abdominal pain: Concern for possible spontaneous bacterial peritonitis.  Acute renal failure on top of chronic kidney disease; low urine output, patient does not seem to be on any diuretics at home, denying following up with gastroenterology as an outpatient, at this time there is also concern for development of hepatorenal syndrome.  Recurrent large-volume ascites due to alcoholic liver disease: According to patient, he is requiring paracentesis every 2 weeks and usually requires anywhere from 5 to 7 L of fluid removal.  According to patient, he underwent fluid removal in the emergency department where couple of liters of fluid was removed.  Initial fluid analysis showing only 10 WBC count does not seem to be concerning for SBP.  06/07/21:Status post paracentesis , 5500 ml yellow fluid was removed , in addition to couple of liters removed in ED  Alcoholic liver cirrhosis; unfortunately patient continues to consume alcohol and admits consuming 1 pint of vodka the night before coming to the hospital.  Discussed with him regarding my concern for significantly associated morbidity and mortality with ongoing alcohol consumption  End-stage liver disease  Pancytopenia associated with liver cirrhosis  Acute on chronic  anemia: Patient is denying any signs and symptoms of bleeding, hemoglobin was down to 7.6.     --Continue patient on current dose of midodrine and pantoprazole.  --continue 2000 mL fluid restriction, tolerating it well.  --During the hospitalization patient was also seen by palliative team and at this time would like to continue with restorative care  --Patient has also required 1 unit of packed red blood cell transfusion during the hospitalization, since then his hemoglobin is stable and his lightheadedness improved  --Patient was also treated with preemptive course of ceftriaxone for 5 days for possibility of SBP as he presented with significant abdominal pain, his fluid was negative for any signs of infection, fluid analysis was repeated with his last paracentesis done on Friday.  -- TIPS was considered however, holding off at this time while treating pneumonia  --Occult blood has been checked during the hospitalization which came back negative.  -- plan for Paracentesis today, Albumin ordered to be given and may need additional albumin   -- check Ammonia level today since reporting feeling confused, though his alert, oriented and appropriate  -- will need close follow-up with gastroenterology and hepatologist as an outpatient.  -- abdomen is distended, but soft and no significant pain at this time  --  plan for GI follow up in 1-2 weeks after d debra     Suspected Aspiration pneumonia  C/o coughing, shortness of breath on exertion and also had low grade fever. For the past couple of days patient has a started to noticed persistent cough, and has been noticed to have temperature of 100  F once or twice a day despite being on ceftriaxone.  - UA negative for UTI. CXR  showing new right-sided middle lobe infiltrates possibly concerning for aspiration PNA  - transition zosyn to PO Augmentin BID to complete total 7 days course  - cough much improved  - Continue to monitor      Acute renal failure on top of CKD stage  III-IV:   His creatinine seem to fluctuate between 1.6 -2 over the last few months. Was 1.5 in March of 2021, then 1.9 on admission. Now creatinine seems to be persistently close to 2 despite multiple paracentesis, albumin transfusion.  Type 2 hepatorenal syndrome  Treatment as above for hepatorenal syndrome type II.  Diuretics are completely avoided due to the elevated creatinine but unfortunately no significant improvement has been noticed.  - Continue 2 L fluid restriction.  - cr 2.15>2.28>2.11>2.29  - Monitor sodium levels closely, patient does not want to be on a low-salt diet but usually does not order any high salt food.     Right Umbilical hernia :  -- Have painful right lower abdominal hernia , not able to be reduced , patient wants to know if any thing can be done about it , discussed with him that at this point I don't think surgery will proceed with any procedure right away due to co morbidities and concern for type 2 hepatorenal syndrome   -- Patient was seen by surgery on 6/8 , high risk for complications , not a good candidate to go for surgery, no signs for hernia incarceration      Acute urinary retention  History of benign prostate hyperplasia  --Patient was retaining more than 800 mL ,also have acute renal failure, was unable to pass any urine, Jiang catheter was placed   --Strict intake and output.  --Continue patient on Flomax 0.8 mg p.o. daily.  -- jiang has been removed and so far tolerating     History of toxic encephalopathy from alcohol intoxication: Currently no signs and symptoms of encephalopathy this morning, seems to be able to answer all the questions.  Patient remains at very high risk for withdrawal though no signs seen thus far.  -- no need for CIWA at this time.   -- multivitamin.  -- Continue patient on thiamine and folic acid.  -- will continue patient on PTA dose of gabapentin 300 mg p.o. 3 times a day but will not give patient high dose of tapering Neurontin at this point.  --  patient does not have currently any signs and symptoms for encephalopathy      Essential hypertension  -- Patient does not seem to be taking any antihypertensive medication, continue to monitor.     COPD  -- Continue PTA Breo Ellipta 200/25 1 puff daily as needed.     Obstructive sleep apnea  --Continue CPAP     Chronic depression and anxiety  --Continue PTA Vortioxetine ,mirtazapine and trazodone and Seroquel       Diet: Regular Diet Adult  Fluid restriction 2000 ML FLUID  Snacks/Supplements Adult: Ensure Enlive; Between Meals    DVT Prophylaxis: Pneumatic Compression Devices  Leger Catheter: not present  Code Status: No CPR- Do NOT Intubate           Disposition Plan   Expected discharge: in 1 day, recommended to transitional care unit once plan for parancentesis/albumin dose today and one TCU bed available. .  Entered: Chely Calhoun MD 06/16/2021, 11:43 AM       The patient's care was discussed with the Bedside Nurse and Patient.    Chely Calhoun MD  Hospitalist Service  [facility]  Contact information available via [facility] Paging/Directory    ______________________________________________________________________    Interval History   Patient reports abdomen feeling more distended today and having more discomfort. Denies nausea or vomiting. Appetite is good. States he feels confused and slow today, however appropriately responding and oriented at time of visit.     Data reviewed today: I reviewed all medications, new labs and imaging results over the last 24 hours. I personally reviewed no images or EKG's today.    Physical Exam   Vital Signs: Temp: 98.8  F (37.1  C) Temp src: Oral BP: 132/74 Pulse: 79   Resp: 18 SpO2: 95 % O2 Device: None (Room air)    Weight: 195 lbs 5.59 oz  General Appearance: Alert, awake and no apparent distress  Respiratory: clear to auscultation bilaterally, no wheezing  Cardiovascular: regular rate and rhythm, trace LE edema  GI: distended, more firm  today  Skin: warm and dry, bruises noted in UE      Data   Recent Labs   Lab 06/15/21  0857 06/13/21  0849 06/12/21  1002 06/11/21  1110 06/10/21  1115   WBC 3.6* 4.4  --   --   --    HGB 8.1* 8.0* 8.0*  --  8.1*   MCV 94 94  --   --   --     159  --   --   --     130* 131* 127* 131*   POTASSIUM 3.7 3.9 4.1 4.3 4.2   CHLORIDE 108 104 105 101 104   CO2 18* 16* 18* 17* 20   BUN 33* 33* 31* 31* 28   CR 2.29* 2.11* 2.28* 2.15* 2.16*   ANIONGAP 10 10 8 9 7   KEV 8.3* 8.5 8.2* 8.1* 8.0*   * 95 110* 121* 107*   ALBUMIN  --   --   --  2.9* 3.1*   PROTTOTAL  --   --   --  6.1* 6.3*   BILITOTAL  --   --   --  0.6 0.6   ALKPHOS  --   --   --  166* 156*   ALT  --   --   --  18 17   AST  --   --   --  30 30     No results found for this or any previous visit (from the past 24 hour(s)).  Medications     - MEDICATION INSTRUCTIONS -       - MEDICATION INSTRUCTIONS -         albumin human  12.5 g Intravenous Once     folic acid  1 mg Oral Daily     gabapentin  300 mg Oral TID     midodrine  2.5 mg Oral TID     mirtazapine  30 mg Oral At Bedtime     multivitamin w/minerals  1 tablet Oral Daily     nicotine   Transdermal Q8H     pantoprazole  40 mg Oral BID     piperacillin-tazobactam  2.25 g Intravenous Q6H     QUEtiapine  200 mg Oral At Bedtime     sodium chloride (PF)  3 mL Intracatheter Q8H     tamsulosin  0.8 mg Oral Daily     vitamin B1  100 mg Oral Daily     traZODone  100 mg Oral At Bedtime     vortioxetine  10 mg Oral BID

## 2021-06-16 NOTE — PROGRESS NOTES
[facility]  Gastroenterology Progress Note     Jim Richard MRN# [id number]   YOB: 1955 Age: 66 year old          Assessment and Plan:   Jim Richard is a pleasant 66 year old male with alcoholic liver cirrhosis complicated with esophageal varices, abdominal ascites, hepatorenal syndrome, pancytopenia and c/o abdominal pain. GI consulted on 6/7/21.     Active Problems:  Alcoholic liver cirrhosis  Elevated Creatinine- hepatorenal syndrome  Abdominal pain  Abdominal ascites- concern for SBP  Hypoalbuminemia  Pancytopenia  Patient has suffered from chronic alcoholism and developed significant complications with decompensated liver cirrhosis. He has required paracentesis every 2 weeks. Has had elevating creatinine- currently 2.29  (05/21 1.58) He has type 2 hepatorenal syndrome. Has pancytopenia due to bone marrow suppression.     Platelets 174  Hemoglobin 8.1 and stable with negative occult blood  MELD score 16  Last paracentesis 6/10 removing 2.5 L  Has c/o SOB and abdominal firmness     -- Continue midodrine and pantoprazole  -- Monitor CBC and cmp  -- Regular diet and encourage good nutrition  -- Ordered ultrasound guided paracentesis  -- Absolute alcohol abstinence            Acute renal failure, unspecified acute renal failure type (H)  End-stage liver disease (H)      Interval History:   denies chest pain, alert, oriented to person, place and time, has had a bowel movement in the last 24 hours and c/o abdominal distention and pain with SOB.               Review of Systems:   C: NEGATIVE for fever, chills, change in weight  E/M: NEGATIVE for ear, mouth and throat problems  R: NEGATIVE for significant cough or SOB  CV: NEGATIVE for chest pain, palpitations or peripheral edema             Medications:   I have reviewed this patient's current medications    folic acid  1 mg Oral Daily     gabapentin  300 mg Oral TID     midodrine  2.5 mg Oral TID     mirtazapine  30  mg Oral At Bedtime     multivitamin w/minerals  1 tablet Oral Daily     nicotine   Transdermal Q8H     pantoprazole  40 mg Oral BID     piperacillin-tazobactam  2.25 g Intravenous Q6H     QUEtiapine  200 mg Oral At Bedtime     sodium chloride (PF)  3 mL Intracatheter Q8H     tamsulosin  0.8 mg Oral Daily     vitamin B1  100 mg Oral Daily     traZODone  100 mg Oral At Bedtime     vortioxetine  10 mg Oral BID                  Physical Exam:   Vitals were reviewed  Vital Signs with Ranges  Temp:  [97.7  F (36.5  C)-98.8  F (37.1  C)] 98.8  F (37.1  C)  Pulse:  [61-79] 79  Resp:  [18] 18  BP: (130-143)/(64-77) 132/74  SpO2:  [95 %-98 %] 95 %  I/O last 3 completed shifts:  In: 360 [P.O.:360]  Out: 300 [Urine:300]  Constitutional: healthy, alert and no distress   Cardiovascular: negative, PMI normal. No lifts, heaves, or thrills. RRR. No murmurs, clicks gallops or rub  Respiratory: negative, Percussion normal. Good diaphragmatic excursion. Lungs clear  Abdomen: Abdomen firm, distended tender at hernia site. BS normal. No masses, organomegaly           Data:   I reviewed the patient's new clinical lab test results.   Recent Labs   Lab Test 06/15/21  0857 06/13/21  0849 06/12/21  1002 06/09/21  1028 06/09/21  1028 06/06/21  0600 06/06/21  0600 05/01/21  1231 03/26/21  2131 03/26/21  2131   WBC 3.6* 4.4  --   --  5.1   < > 3.6* 7.6  --  4.8   HGB 8.1* 8.0* 8.0*   < > 7.2*   < > 7.4* 8.0*  --  8.3*   MCV 94 94  --   --  98   < > 100 94  --  97    159  --   --  95*   < > 111* 208   < > 162   INR  --   --   --   --   --   --  1.31* 1.20*  --  1.24*    < > = values in this interval not displayed.     Recent Labs   Lab Test 06/15/21  0857 06/13/21  0849 06/12/21  1002   POTASSIUM 3.7 3.9 4.1   CHLORIDE 108 104 105   CO2 18* 16* 18*   BUN 33* 33* 31*   ANIONGAP 10 10 8     Recent Labs   Lab Test 06/13/21  1150 06/11/21  1110 06/10/21  1115 06/09/21  1028 06/06/21  1023 06/06/21  1023 03/26/21  2131 03/26/21  2131  03/13/21  0946 03/13/21  0946 03/11/21  0636 03/10/21  2252   ALBUMIN  --  2.9* 3.1* 2.3*   < >  --    < > 2.7*   < > 2.9*  --  3.1*   BILITOTAL  --  0.6 0.6 0.4   < >  --    < > 2.2*   < > 1.3  --  1.8*   ALT  --  18 17 13   < >  --    < > 26   < > 37  --  39   AST  --  30 30 18   < >  --    < > 36   < > 71*  --  89*   PROTEIN Negative  --   --   --   --  20*  --   --   --   --  70*  --    LIPASE  --   --   --   --   --   --   --  444*  --  490*  --  703*    < > = values in this interval not displayed.       I reviewed the patient's new imaging results.    All laboratory data reviewed  All imaging studies reviewed by me.    Gloria De Leon PA-C,  6/16/2021  [facility]  Office : [phone]  Cell: [phone] (Dr. Shaw)  Cell: [phone] (Gloria De Leon PA-C)

## 2021-06-16 NOTE — PROGRESS NOTES
Care Suites Procedure Nursing Note    Patient Information  Name: Jim Richard  Age: 66 year old    Procedure    Procedure: Paracentesis  Procedure start time: 13:36  Procedure complete time: 13:50  Concerns/abnormal assessment: None  Pt tolerated well. VSS.   4,100 cc yellow fluid removed from abdomen w/o difficulty.   Dermabond and bandaid applied to site - CDI.   Pt back to rm 632 per cart & transport. Detailed report called to VASQUEZ Chaparro.

## 2021-06-16 NOTE — PLAN OF CARE
DATE & TIME: 6/16/21 6639-4051  Cognitive Concerns/ Orientation : A&Ox4, forgetful at times.   BEHAVIOR & AGGRESSION TOOL COLOR: Green  ABNL VS/O2: VSS on RA   MOBILITY: Ax1 /GB/W  PAIN MANAGMENT: pain in abd greatly improved after paracentesis  DIET: Regular diet. Fluid restriction 2000ml, tolerating  BOWEL/BLADDER: Continent  ABNL LAB/BG: NA  DRAIN/DEVICES: PIV SL.  TELEMETRY RHYTHM: NA  SKIN: Scattered scabbing/bruising, band aid paracentesis site on Rt side abd, CDI. Rt elbow abrasion, scabbed - AGUILA.   TESTS/PROCEDURES: none   D/C DATE: pending TCU placement  OTHER IMPORTANT INFORMATION: 4L taken off today during paracentesis.  Pt states relief PT/OT/GI following. On strict I&O.

## 2021-06-16 NOTE — PLAN OF CARE
Neuro- A&Ox4  Most Recent Vitals- Temp: 97.7  F (36.5  C) Temp src: Oral BP: 134/64 Pulse: 67   Resp: 18 SpO2: 98 % O2 Device: None (Room air)   Tele/Cardiac- denies CP, not currently on tele  Resp- stable on Ra, gets NICOLE and slight SOB at rest  Activity- SBA  Pain- reports abdominal pain, PRN oxy given x 1 with relief  Drips- int. ABX  Drains/Tubes- PIV  Skin- bruised with some skin tears  GI/- distended, tender abdomen, BS active  Aggression Color- Green  COVID status- Negative  Plan- Waiting on TCU placement  Misc- Pt is requesting paracentesis before discharge due to increased discomfort from fluids.     Flora Whaley RN

## 2021-06-16 NOTE — CONSULTS
Care Management Initial Consult    General Information  Assessment completed with: Patient, Other, (Commitment CM Omayra Angela [phone])  Type of CM/SW Visit: Initial Assessment    Primary Care Provider verified and updated as needed:     Readmission within the last 30 days: previous discharge plan unsuccessful      Reason for Consult: discharge planning  Advance Care Planning:    has active Health Care Directive dated 9- in [facility]        Communication Assessment  Patient's communication style: spoken language (English or Bilingual)    Hearing Difficulty or Deaf: no   Wear Glasses or Blind: no    Cognitive  Cognitive/Neuro/Behavioral: .WDL except  Level of Consciousness: alert  Arousal Level: opens eyes spontaneously  Orientation: oriented x 4  Mood/Behavior: calm, cooperative  Best Language: 0 - No aphasia  Speech: clear    Living Environment:   People in home: other (see comments)(roommate)     Current living Arrangements: house      Able to return to prior arrangements: (TCU recommended)  Living Arrangement Comments: (concern for patient living alone due to ETOH use)    Family/Social Support:  Care provided by: self  Provides care for:    Marital Status:   Children, Sibling(s)          Description of Support System: (dependent upon patient allowing support)         Current Resources:   Patient receiving home care services: No     Community Resources:    Equipment currently used at home: walker, standard, grab bar, tub/shower  Supplies currently used at home:      Employment/Financial:  Employment Status: retired        Financial Concerns: (patient doesn't want to use his funds for care)           Lifestyle & Psychosocial Needs:        Socioeconomic History     Marital status:      Spouse name: Not on file     Number of children: 2     Years of education: Not on file     Highest education level: Not on file   Occupational History     Occupation: Printer, on disability     Tobacco Use      Smoking status: Current Every Day Smoker     Packs/day: 1.00     Types: Cigarettes     Smokeless tobacco: Never Used     Tobacco comment: Chantix caused nightmares   Substance and Sexual Activity     Alcohol use: Yes     Comment: 1 pint of vodka a day     Drug use: No     Sexual activity: Not Currently       Functional Status:  Prior to admission patient needed assistance:              Mental Health Status:          Chemical Dependency Status:  Chemical Dependency Status: Current Concern  Chemical Dependency Management: (patient under a CD commitment, recently was at [place])          Values/Beliefs:  Spiritual, Cultural Beliefs, Religion Practices, Values that affect care:                 Additional Information:  TCU has been recommended and patient has agreed.  Referral sent to [facility] and [facility] which are two systems which will consider patient's for TCU stay in which patient has active excessive alcohol use.   Both [facility] and [facility] facilities have declined patient-  based on therapy notes they do not feel patient has a skill therapy need and he doesn't have a daily skilled nursing need such as IV therapy or wound care. Patient will need to pay privately if he goes to TCU.  In the past,he has declined going to TCU if he needs to pay privately.    Tomorrow writer will discuss with patient, Dr Calhoun and Omayra Astorga that he does not qualify for SNF benefits in a TCU.   If patient goes home, we will arrange for home RN, PT and OT. Ms Astorga will also be able to follow closely and reported in the past  she can take patient to his paracentesis appointments.  TANA CejaSW

## 2021-06-17 ENCOUNTER — HOSPITAL ENCOUNTER (OUTPATIENT)
Facility: CLINIC | Age: 66
End: 2021-06-17
Payer: MEDICARE

## 2021-06-17 VITALS
HEIGHT: 67 IN | DIASTOLIC BLOOD PRESSURE: 62 MMHG | BODY MASS INDEX: 28.05 KG/M2 | WEIGHT: 178.7 LBS | TEMPERATURE: 98.6 F | OXYGEN SATURATION: 96 % | HEART RATE: 65 BPM | RESPIRATION RATE: 18 BRPM | SYSTOLIC BLOOD PRESSURE: 130 MMHG

## 2021-06-17 PROCEDURE — 250N000013 HC RX MED GY IP 250 OP 250 PS 637: Performed by: INTERNAL MEDICINE

## 2021-06-17 PROCEDURE — 99239 HOSP IP/OBS DSCHRG MGMT >30: CPT | Performed by: INTERNAL MEDICINE

## 2021-06-17 RX ORDER — AMOXICILLIN AND CLAVULANATE POTASSIUM 500; 125 MG/1; MG/1
1 TABLET, FILM COATED ORAL EVERY 12 HOURS
Qty: 6 TABLET | Refills: 0 | Status: SHIPPED | OUTPATIENT
Start: 2021-06-17 | End: 2021-06-20

## 2021-06-17 RX ADMIN — QUETIAPINE FUMARATE 50 MG: 25 TABLET ORAL at 10:03

## 2021-06-17 RX ADMIN — MULTIPLE VITAMINS W/ MINERALS TAB 1 TABLET: TAB at 08:11

## 2021-06-17 RX ADMIN — GABAPENTIN 300 MG: 300 CAPSULE ORAL at 08:11

## 2021-06-17 RX ADMIN — THIAMINE HCL TAB 100 MG 100 MG: 100 TAB at 08:11

## 2021-06-17 RX ADMIN — TAMSULOSIN HYDROCHLORIDE 0.8 MG: 0.4 CAPSULE ORAL at 08:11

## 2021-06-17 RX ADMIN — OXYCODONE HYDROCHLORIDE 10 MG: 5 TABLET ORAL at 00:42

## 2021-06-17 RX ADMIN — FOLIC ACID 1 MG: 1 TABLET ORAL at 08:11

## 2021-06-17 RX ADMIN — VORTIOXETINE 10 MG: 10 TABLET, FILM COATED ORAL at 08:11

## 2021-06-17 RX ADMIN — PANTOPRAZOLE SODIUM 40 MG: 40 TABLET, DELAYED RELEASE ORAL at 08:11

## 2021-06-17 RX ADMIN — CARBIDOPA AND LEVODOPA 2.5 MG: 50; 200 TABLET, EXTENDED RELEASE ORAL at 12:20

## 2021-06-17 RX ADMIN — NICOTINE 1 PATCH: 21 PATCH, EXTENDED RELEASE TRANSDERMAL at 00:43

## 2021-06-17 RX ADMIN — CARBIDOPA AND LEVODOPA 2.5 MG: 50; 200 TABLET, EXTENDED RELEASE ORAL at 08:11

## 2021-06-17 RX ADMIN — AMOXICILLIN AND CLAVULANATE POTASSIUM 1 TABLET: 500; 125 TABLET, FILM COATED ORAL at 08:11

## 2021-06-17 ASSESSMENT — MIFFLIN-ST. JEOR: SCORE: 1544.21

## 2021-06-17 ASSESSMENT — ACTIVITIES OF DAILY LIVING (ADL)
ADLS_ACUITY_SCORE: 12

## 2021-06-17 NOTE — PLAN OF CARE
Occupational Therapy Discharge Summary    Reason for therapy discharge:    Discharged to home with home therapy.    Progress towards therapy goal(s). See goals on Care Plan in [facility] electronic health record for goal details.  Goals partially met.  Barriers to achieving goals:   discharge from facility.    Therapy recommendation(s):    Continued therapy is recommended.  Rationale/Recommendations:  Maximize ADL independence and safety.

## 2021-06-17 NOTE — PROGRESS NOTES
Care Management Discharge Note    Discharge Date: 06/17/21       Discharge Disposition: Home Care    Discharge Services:      Discharge DME:      Discharge Transportation: other (see comments)    Private pay costs discussed:reviewed with patient that he has been declined by TCU's as they do not feel he has a skillable need under Medicare guidelines due to the distance he is walking with PT.  Explained he can still go to a [facility] if he would like but it will be private pay which averages $400.00 a day.  Patient declines.  This is a common outcome during previous hospitalizations. By the time patient is medically stable he has progressed with his mobility to the point he doesn't qualify for Medicare benefit and he never wants to pay privately for TCU as he reports he is feeling well enough to go home. Patient does want to have home RN,PT and OT.  He knows he is eligible for this service as long as he is considered home bound and benefiting from the service.  He would like the home care nurse to set up his medications.  He acknowledge he missed one paracentesis in the community because he was drinking.  He said he will try to not drink.  He plans to use a taxi to and from the hospital for his outpatient paracentesis.      Contacted Omayra Astorga RN, his Behavioral  and updated her.    Omayra will re-start his MOW's 5 days a week.  She will take patient to his medical appointments.  She would like patient to have [facility].  She supports patient having home RN,PT and OT.      PAS Confirmation Code: (n/a)  Patient/family educated on Medicare website which has current facility and service quality ratings:      Education Provided on the Discharge Plan:  yes  Persons Notified of Discharge Plans:patient and Omayra Astorga  Patient/Family in Agreement with the Plan: yes    Handoff Referral Completed: Yes    Additional Information:  Will make a referral to [facility] asking they call patient to discuss setting up  services.    Addendum:  Taxi ride arranged for patient thru the hospital account ride system ([employer])  Account ride used as patient came to the hospital via EMS and did not have his wallet with him thus doesn't have a source to pay for the taxi.    Addendum:  [facility] has declined accept patient's referral.  Writer made a referral to [facility] at [phone] with [facility] in Intake. [facility] has access to Epic and did not need anything documents faxed. Their agency can provide RN start of care tomorrow.  Updated both Jim and Omayra Astorga.          Earline Mauricio, LICSW

## 2021-06-17 NOTE — PROGRESS NOTES
[facility]  Gastroenterology Progress Note     Jim Richard MRN# [id number]   YOB: 1955 Age: 66 year old          Assessment and Plan:   Jim Richard is a pleasant 66 year old male with alcoholic liver cirrhosis complicated with esophageal varices, abdominal ascites, hepatorenal syndrome, pancytopenia and c/o abdominal pain. GI consulted on 6/7/21.     Active Problems:  Alcoholic liver cirrhosis  Elevated Creatinine- hepatorenal syndrome  Abdominal pain  Abdominal ascites- concern for SBP  Hypoalbuminemia  Pancytopenia  Patient has suffered from chronic alcoholism and developed significant complications with decompensated liver cirrhosis. He has required paracentesis every 2 weeks. Has had elevating creatinine- currently 2.29  (05/21 1.58) He has type 2 hepatorenal syndrome. Has pancytopenia due to bone marrow suppression.     Platelets 174  Hemoglobin 8.1 and stable with negative occult blood  MELD score 16  Last paracentesis 6/16 removing 4.1 L ( prior on 6/10 and drained 2.5 L)     -- Continue midodrine and pantoprazole  -- Monitor CBC and cmp  -- Regular diet and encourage good nutrition  -- Absolute alcohol abstinence  -- Ok with GI to discharge patient with plans for outpatient f/u in 1-2 weeks            Acute renal failure, unspecified acute renal failure type (H)  End-stage liver disease (H)      Interval History:   no new complaints, doing well and doing well; no cp, sob, n/v/d, or abd pain.              Review of Systems:   C: NEGATIVE for fever, chills, change in weight  E/M: NEGATIVE for ear, mouth and throat problems  R: NEGATIVE for significant cough or SOB  CV: NEGATIVE for chest pain, palpitations or peripheral edema             Medications:   I have reviewed this patient's current medications    albumin human  12.5 g Intravenous Once     amoxicillin-clavulanate  1 tablet Oral Q12H SONIA     folic acid  1 mg Oral Daily     gabapentin  300 mg Oral TID      midodrine  2.5 mg Oral TID     mirtazapine  30 mg Oral At Bedtime     multivitamin w/minerals  1 tablet Oral Daily     nicotine   Transdermal Q8H     pantoprazole  40 mg Oral BID     QUEtiapine  200 mg Oral At Bedtime     sodium chloride (PF)  3 mL Intracatheter Q8H     tamsulosin  0.8 mg Oral Daily     vitamin B1  100 mg Oral Daily     traZODone  100 mg Oral At Bedtime     vortioxetine  10 mg Oral BID                  Physical Exam:   Vitals were reviewed  Vital Signs with Ranges  Temp:  [98.5  F (36.9  C)-98.9  F (37.2  C)] 98.6  F (37  C)  Pulse:  [62-73] 65  Resp:  [16-18] 18  BP: (122-134)/(56-67) 130/62  SpO2:  [96 %-98 %] 96 %  I/O last 3 completed shifts:  In: 1720 [P.O.:1720]  Out: 495 [Urine:495]  Constitutional: healthy, alert and no distress   Cardiovascular: negative, PMI normal. No lifts, heaves, or thrills. RRR. No murmurs, clicks gallops or rub  Respiratory: negative, Percussion normal. Good diaphragmatic excursion. Lungs clear  Abdomen: Abdomen soft, distended, non-tender. BS normal. No masses, organomegaly           Data:   I reviewed the patient's new clinical lab test results.   Recent Labs   Lab Test 06/15/21  0857 06/13/21  0849 06/12/21  1002 06/09/21  1028 06/09/21  1028 06/06/21  0600 06/06/21  0600 05/01/21  1231 03/26/21  2131 03/26/21  2131   WBC 3.6* 4.4  --   --  5.1   < > 3.6* 7.6  --  4.8   HGB 8.1* 8.0* 8.0*   < > 7.2*   < > 7.4* 8.0*  --  8.3*   MCV 94 94  --   --  98   < > 100 94  --  97    159  --   --  95*   < > 111* 208   < > 162   INR  --   --   --   --   --   --  1.31* 1.20*  --  1.24*    < > = values in this interval not displayed.     Recent Labs   Lab Test 06/15/21  0857 06/13/21  0849 06/12/21  1002   POTASSIUM 3.7 3.9 4.1   CHLORIDE 108 104 105   CO2 18* 16* 18*   BUN 33* 33* 31*   ANIONGAP 10 10 8     Recent Labs   Lab Test 06/16/21  1123 06/13/21  1150 06/11/21  1110 06/10/21  1115 06/09/21  1028 06/06/21  1023 06/06/21  1023 03/26/21  2131 03/26/21 2131  03/13/21  0946 03/13/21  0946 03/11/21  0636 03/10/21  2252   ALBUMIN 2.2*  --  2.9* 3.1* 2.3*   < >  --    < > 2.7*   < > 2.9*  --  3.1*   BILITOTAL  --   --  0.6 0.6 0.4   < >  --    < > 2.2*   < > 1.3  --  1.8*   ALT  --   --  18 17 13   < >  --    < > 26   < > 37  --  39   AST  --   --  30 30 18   < >  --    < > 36   < > 71*  --  89*   PROTEIN  --  Negative  --   --   --   --  20*  --   --   --   --  70*  --    LIPASE  --   --   --   --   --   --   --   --  444*  --  490*  --  703*    < > = values in this interval not displayed.       I reviewed the patient's new imaging results.    All laboratory data reviewed  All imaging studies reviewed by me.    Gloria De Leon PA-C,  6/17/2021  [facility]  Office : [phone]  Cell: [phone] (Dr. Shaw)  Cell: [phone] (Gloria De Leon PA-C)

## 2021-06-17 NOTE — PROGRESS NOTES
Talked with on-call NP Melody to clarify whether to give 2200 Albumin dose. Albumin was ordered as one time dose tonight, but order parameters say to only give if paracentesis drainage is over 5000ml. Drained 4.1L today. Per NP, follow order parameters.

## 2021-06-17 NOTE — DISCHARGE SUMMARY
[facility]  Hospitalist Discharge Summary      Date of Admission:  6/6/2021  Date of Discharge:  6/17/2021  Discharging Provider: Chely Calhoun MD      Discharge Diagnoses   Acute generalized abdominal pain-  Recurrent large-volume ascites due to alcoholic liver disease  End stage liver disease secondary to Alcoholic liver cirrhosis  Pancytopenia associated with liver cirrhosis  Acute on chronic anemia  Acute renal failure on top of CKD stage III-IV:   Type 2 hepatorenal syndrome  Suspected aspiration pneumonia  Right Umbilical hernia   Acute urinary retention  History of benign prostate hyperplasia  History of toxic encephalopathy from alcohol intoxication  Essential HTN  COPD  JANIS  Chronic depression and anxiety      Follow-ups Needed After Discharge   Follow-up Appointments     Follow-up and recommended labs and tests       Follow up with primary care provider, FERNANDA BRANTLEY, within 7 days   for hospital follow- up.  The following labs/tests are recommended: basic   metabolic panel in a week.             Unresulted Labs Ordered in the Past 30 Days of this Admission     Date and Time Order Name Status Description    6/13/2021 0755 Blood culture Preliminary     6/13/2021 0755 Blood culture Preliminary           Discharge Disposition   Discharged to home  Condition at discharge: Stable    Hospital Course   Jim Richard is a 66 year old male with past medical history significant for persistent alcohol abuse, alcoholic cirrhosis, recurrent ascites requiring weekly to biweekly paracentesis, COPD, CAD, esophageal varices, essential hypertension, peripheral vascular disease, pancytopenia, chronic kidney disease or chronic thrombocytopenia and anemia presented to the emergency room on June 5th due to the concern for new and acute abdominal pain with distention associated with ascites.  Patient admitted drinking a pint of vodka the night before.     Acute generalized abdominal  pain: Concern for possible spontaneous bacterial peritonitis.improved  Patient was also treated with preemptive course of ceftriaxone for 5 days for possibility of SBP as he presented with significant abdominal pain, his fluid was negative for any signs of infection, fluid analysis was repeated with his last paracentesis done also not suggestive of SBP. Abdominal pain seems to be improved currently./       Recurrent large-volume ascites due to alcoholic liver disease   End-stage liver disease secondary to alcoholic liver cirrhosis  According to patient, he is requiring paracentesis every 2 weeks and usually requires anywhere from 5 to 7 L of fluid removal.  According to patient, he underwent fluid removal in the emergency department where couple of liters of fluid was removed. Initial fluid analysis showing only 10 WBC count does not seem to be concerning for SBP. 06/07/21:Status post paracentesis , 5500 ml yellow fluid was removed , in addition to couple of liters removed in ED.  6/10- he had 2.5L removed. Due to increased distention and abdominal discomfort, had repeat paracentesis on 6/16 with removal of 4L. He has received albumin infusion with due to hepatorenal syndrome.There was consideration of TIPS, however this is deferred as he is also found to have pneumonia.   Abdomen currently feels improved. He is strongly advised to stop drinking. He will follow up with GI  In 1-2 weeks.   --continue 2000 mL fluid restriction, tolerating it well.  --Continue midodrine and pantoprazole.  --During the hospitalization patient was also seen by palliative team and at this time would like to continue with restorative care    Pancytopenia associated with liver cirrhosis  Acute on chronic anemia:  Patient is denying any signs and symptoms of bleeding, hemoglobin was down to 7.6.Patient has also required 1 unit of packed red blood cell transfusion during the hospitalization, since then his hemoglobin is stable and his  lightheadedness improved  --Occult blood has been checked during the hospitalization which came back negative.     Suspected Aspiration pneumonia  C/o coughing, shortness of breath on exertion and also had low grade fever. For the past couple of days patient has a started to noticed persistent cough, and has been noticed to have temperature of 100  F once or twice a day despite being on ceftriaxone.UA negative for UTI. CXR  showing new right-sided middle lobe infiltrates possibly concerning for aspiration PNA. Cough has much improved with treatment.   - treated with Zosyn which has since been transitioned to Augmentin. He will continue for 3 more days to complete course    Acute renal failure on top of CKD stage III-IV  Type 2 hepatorenal syndrome  His creatinine seem to fluctuate between 1.6 -2 over the last few months. Was 1.5 in March of 2021, then 1.9 on admission. Now creatinine seems to be persistently close to 2 despite multiple paracentesis, albumin transfusion.  Patient is not on any diuretics at home, denying following up with gastroenterology as an outpatient, at this time there is also concern for development of hepatorenal syndrome.  - cr 2.15>2.28>2.11>2.29  - f/u BMP in 1 week to ensure renal function stays stable  - he is on Midodrine as above     Right Umbilical hernia :  -- Have painful right lower abdominal hernia , not able to be reduced , patient wants to know if any thing can be done about it , discussed with him that at this point I don't think surgery will proceed with any procedure right away due to co morbidities and concern for type 2 hepatorenal syndrome   -- Patient was seen by surgery on 6/8 , high risk for complications , not a good candidate to go for surgery, no signs for hernia incarceration      Acute urinary retention-resolved  History of benign prostate hyperplasia  Patient was retaining more than 800 mL ,also have acute renal failure, was unable to pass any urine, Leger catheter  was placed   -- jiang has been removed and so far tolerating  --Continue patient on Flomax 0.8 mg p.o. daily.    History of toxic encephalopathy from alcohol intoxication: Currently no signs and symptoms of encephalopathy this morning, seems to be able to answer all the questions.  No signs of alcohol withdrawal while in the hospital  -- will continue patient on PTA dose of gabapentin      Essential hypertension  -- Patient does not seem to be taking any antihypertensive medication    COPD  -- Continue PTA Breo Ellipta 200/25 1 puff daily as needed.     Obstructive sleep apnea     Chronic depression and anxiety  --Continue PTA Vortioxetine ,mirtazapine and trazodone and Seroquel      Consultations This Hospital Stay   GASTROENTEROLOGY IP CONSULT  SOCIAL WORK IP CONSULT  CARE MANAGEMENT / SOCIAL WORK IP CONSULT  PHYSICAL THERAPY ADULT IP CONSULT  OCCUPATIONAL THERAPY ADULT IP CONSULT  SURGERY GENERAL IP CONSULT  PALLIATIVE CARE ADULT IP CONSULT    Code Status   No CPR- Do NOT Intubate    Time Spent on this Encounter   IChely MD, personally saw the patient today and spent 40 minutes discharging this patient.       Chely Calhoun MD  [place] [address] MEDICAL SPECIALTY UNIT  [facility] [place] [address]  Phone: [phone]  ______________________________________________________________________    Physical Exam   Vital Signs: Temp: 98.6  F (37  C) Temp src: Oral BP: 130/62 Pulse: 65   Resp: 18 SpO2: 96 % O2 Device: None (Room air)    Weight: 178 lbs 11.2 oz  General Appearance: Alert, awake and no apparent distress  Respiratory: CTAB, no wheezing  Cardiovascular: regular rate and rhythm  GI: soft and non-tender  Skin: warm and dry         Primary Care Physician   FERNANDA BRANTLEY    Discharge Orders      Home care nursing referral      Home Care PT Referral for Hospital Discharge      Home Care OT Referral for Hospital Discharge      Reason for your hospital stay    You  were admitted for abdominal pain and increasing ascites. You will be discharging home on few more days of antibiotics for pneumonia.     Follow-up and recommended labs and tests     Follow up with primary care provider, FERNANDA BRANTLEY, within 7 days for hospital follow- up.  The following labs/tests are recommended: basic metabolic panel in a week.     Activity    Your activity upon discharge: activity as tolerated     MD face to face encounter    Documentation of Face to Face and Certification for Home Health Services    I certify that patient: Jim Richard is under my care and that I, or a nurse practitioner or physician's assistant working with me, had a face-to-face encounter that meets the physician face-to-face encounter requirements with this patient on: 6/17/2021.    This encounter with the patient was in whole, or in part, for the following medical condition, which is the primary reason for home health care: Cirrhosis with ascites, deconditioning.    I certify that, based on my findings, the following services are medically necessary home health services: Nursing, Occupational Therapy and Physical Therapy.    My clinical findings support the need for the above services because: Nurse is needed: To assess medication tolerance and appropriate medication use after changes in medications or other medical regimen.., Occupational Therapy Services are needed to assess and treat cognitive ability and address ADL safety due to impairment in functional mobility. and Physical Therapy Services are needed to assess and treat the following functional impairments: generalized weakness and decreased functional mobility and to improve functional independence.     Further, I certify that my clinical findings support that this patient is homebound (i.e. absences from home require considerable and taxing effort and are for medical reasons or [facility] services or infrequently or of short duration when for other  reasons) because: patient has cirrhosis with ascites and generalized weakness, will require assistance to leave home.    Based on the above findings. I certify that this patient is confined to the home and needs intermittent skilled nursing care, physical therapy and/or speech therapy.  The patient is under my care, and I have initiated the establishment of the plan of care.  This patient will be followed by a physician who will periodically review the plan of care.  Physician/Provider to provide follow up care: Talon Elias A    Attending hospital physician (the Medicare certified [place] provider): Chely Calhoun MD  Physician Signature: See electronic signature associated with these discharge orders.  Date: 6/17/2021     Diet    Follow this diet upon discharge: Orders Placed This Encounter      Fluid restriction 2000 ML FLUID      Snacks/Supplements Adult: Ensure Enlive; Between Meals      Regular Diet Adult       Significant Results and Procedures   Most Recent 3 CBC's:  Recent Labs   Lab Test 06/15/21  0857 06/13/21  0849 06/12/21  1002 06/09/21  1028 06/09/21  1028   WBC 3.6* 4.4  --   --  5.1   HGB 8.1* 8.0* 8.0*   < > 7.2*   MCV 94 94  --   --  98    159  --   --  95*    < > = values in this interval not displayed.     Most Recent 3 BMP's:  Recent Labs   Lab Test 06/15/21  0857 06/13/21  0849 06/12/21  1002    130* 131*   POTASSIUM 3.7 3.9 4.1   CHLORIDE 108 104 105   CO2 18* 16* 18*   BUN 33* 33* 31*   CR 2.29* 2.11* 2.28*   ANIONGAP 10 10 8   KEV 8.3* 8.5 8.2*   * 95 110*     Most Recent 2 LFT's:  Recent Labs   Lab Test 06/11/21  1110 06/10/21  1115   AST 30 30   ALT 18 17   ALKPHOS 166* 156*   BILITOTAL 0.6 0.6   ,   Results for orders placed or performed during the hospital encounter of 06/06/21   US Paracentesis    Narrative    EXAM: US PARACENTESIS  6/7/2021 2:44 PM       HISTORY: Ascites    PROCEDURE:  Written informed consent was obtained from the patient  prior to the  procedure. The risks and benefits including bleeding,  infection and abdominal organ injury were discussed and the patient  wished to continue. Initial ultrasound images demonstrated a large  left lower quadrant fluid collection. The skin overlying this  collection was marked, prepped, draped and anesthetized in usual  sterile fashion utilizing 10 mL 1% lidocaine. The paracentesis  catheter was then placed into the peritoneal fluid collection with  return of 5500 mL of re colored fluid. Patient tolerated the  procedure well. The patient will receive intravenous albumin on the  usual sliding scale as needed per protocol.      US guidance was utilized to enter the peritoneal space for  paracentesis with permanent image recoding.      Impression    IMPRESSION:  Ultrasound-guided paracentesis.      MEL BILLS PA-C   US Paracentesis    Narrative    US PARACENTESIS 6/9/2021 2:16 PM    CLINICAL HISTORY: HIGH VOLUME paracentesis with or without diagnostic  fluid analysis with labs to be drawn if ordered. Total paracentesis  volume as much as possible.    PROCEDURE: Informed consent obtained. Time out performed. The abdomen  was prepped and draped in a sterile fashion. 10 mL of 1% lidocaine was  infused into local soft tissues. A 5 French catheter system was  introduced into the abdominal ascites under ultrasound guidance.    4 liters of clear fluid were removed and sent to lab if requested.    Patient tolerated procedure well.    Ultrasound imaging was obtained and placed in the patient's permanent  medical record.      Impression    IMPRESSION:  1.  Status post ultrasound-guided paracentesis.    ANIL KERN MD   US Paracentesis    Narrative    US PARACENTESIS 6/10/2021 12:47 PM    CLINICAL HISTORY: HIGH VOLUME paracentesis with or without diagnostic  fluid analysis with labs to be drawn if ordered. Total paracentesis  volume as much as possible.    PROCEDURE: Informed consent obtained. Time out performed. The  abdomen  was prepped and draped in a sterile fashion. 10 mL of 1% lidocaine was  infused into local soft tissues. A 5 Romanian catheter system was  introduced into the abdominal ascites under ultrasound guidance.    2.5 liters of clear fluid were removed and sent to lab if requested.    Patient tolerated procedure well.    Ultrasound imaging was obtained and placed in the patient's permanent  medical record.      Impression    IMPRESSION:  1.  Status post ultrasound-guided paracentesis.    LESTER J FAHRNER, MD   XR Chest 2 Views    Narrative    CHEST TWO VIEWS June 13, 2021 8:35 AM     HISTORY: Fever and cough.    COMPARISON: 3/26/2021.      Impression    IMPRESSION: Patchy new opacities at the right mid to lower lung that  could represent pneumonia. Stable small opacities at the left mid to  low lung. Stable elevated left hemidiaphragm. Old healed right rib  fractures. Stable normal cardiac silhouette.    JASON MARKS MD   US Paracentesis    Narrative    US PARACENTESIS 6/16/2021 2:22 PM    CLINICAL HISTORY: HIGH VOLUME paracentesis with or without diagnostic  fluid analysis with labs to be drawn if ordered. Total paracentesis  volume as much as possible.    PROCEDURE: Informed consent obtained. Time out performed. The abdomen  was prepped and draped in a sterile fashion. 10 mL of 1% lidocaine was  infused into local soft tissues. An 8 Romanian catheter system was  introduced into the abdominal ascites under ultrasound guidance.    4.1 liters of clear fluid were removed and sent to lab if requested.    Patient tolerated procedure well.    Ultrasound imaging was obtained and placed in the patient's permanent  medical record.      Impression    IMPRESSION:  1.  Status post ultrasound-guided paracentesis.    MACKENZIE DYE MD       Discharge Medications   Current Discharge Medication List      START taking these medications    Details   amoxicillin-clavulanate (AUGMENTIN) 500-125 MG tablet Take 1 tablet by mouth every 12  hours for 3 days  Qty: 6 tablet, Refills: 0    Associated Diagnoses: Pneumonia due to infectious organism, unspecified laterality, unspecified part of lung         CONTINUE these medications which have NOT CHANGED    Details   diazepam (VALIUM) 10 MG tablet Take 10 mg by mouth every 12 hours as needed for anxiety      eszopiclone (LUNESTA) 3 MG tablet Take 3 mg by mouth nightly as needed for sleep      fluticasone-vilanterol (BREO ELLIPTA) 200-25 MCG/INH inhaler Inhale 1 puff into the lungs daily as needed (SOB)  Qty: 28 each, Refills: 0    Associated Diagnoses: Alcoholic intoxication without complication (H)      folic acid (FOLVITE) 1 MG tablet Take 1 mg by mouth daily      gabapentin (NEURONTIN) 300 MG capsule Take 300 mg by mouth 3 times daily      magnesium oxide (MAG-OX) 400 (240 Mg) MG tablet Take 400 mg by mouth daily      midodrine (PROAMATINE) 2.5 MG tablet Take 2.5 mg by mouth 3 times daily      mirtazapine (REMERON) 30 MG tablet Take 30 mg by mouth At Bedtime Filled 1/11/21 for #30      multivitamin w/minerals (THERA-VIT-M) tablet Take 1 tablet by mouth daily  Qty: 30 tablet, Refills: 0    Associated Diagnoses: Alcoholic intoxication without complication (H)      nicotine (NICODERM CQ) 21 MG/24HR 24 hr patch Place 1 patch onto the skin every 24 hours      pantoprazole (PROTONIX) 40 MG EC tablet Take 1 tablet (40 mg) by mouth 2 times daily  Qty: 60 tablet, Refills: 0    Associated Diagnoses: Alcoholic intoxication without complication (H)      !! QUEtiapine (SEROQUEL) 200 MG tablet Take 200 mg by mouth At Bedtime Filled 1/11/21 #30       !! QUEtiapine (SEROQUEL) 50 MG tablet Take 1 tablet (50 mg) by mouth 3 times daily as needed (anxiety) Filled 11/20/20 #30  Qty: 90 tablet, Refills: 0    Associated Diagnoses: Anxiety and depression      tamsulosin (FLOMAX) 0.4 MG capsule Take 2 capsules (0.8 mg) by mouth daily  Qty: 30 capsule, Refills: 0    Associated Diagnoses: Benign prostatic hyperplasia with urinary  retention      traZODone (DESYREL) 100 MG tablet Take 100 mg by mouth At Bedtime Filled 1/11/21 #30      vortioxetine (TRINTELLIX) 10 MG tablet Take 10 mg by mouth 2 times daily      albuterol (PROAIR HFA/PROVENTIL HFA/VENTOLIN HFA) 108 (90 Base) MCG/ACT inhaler Inhale 1-2 puffs into the lungs every 4 hours as needed for shortness of breath / dyspnea or wheezing    Comments: Pharmacy may dispense brand covered by insurance (Proair, or proventil or ventolin or generic albuterol inhaler)       !! - Potential duplicate medications found. Please discuss with provider.      STOP taking these medications       hydrOXYzine (ATARAX) 50 MG tablet Comments:   Reason for Stopping:             Allergies   Allergies   Allergen Reactions     Amlodipine Swelling     Lisinopril      Other reaction(s): Angioedema  Mouth and tongue swelling   Mouth and tongue swelling

## 2021-06-17 NOTE — PLAN OF CARE
DATE & TIME: 6/16/21 1900-0730  Cognitive Concerns/ Orientation : A&Ox4, forgetful at times.   BEHAVIOR & AGGRESSION TOOL COLOR: Green  ABNL VS/O2: VSS on RA   MOBILITY: SBA GB/walker  PAIN MANAGMENT: Oxycodone given x1 for abdominal pain.   DIET: Regular diet. Fluid restriction 2000ml  BOWEL/BLADDER: Continent  ABNL LAB/BG: NA  DRAIN/DEVICES: PIV SL.  TELEMETRY RHYTHM: NA  SKIN: Scattered scabbing/bruising, band aid paracentesis site on Rt side abd, CDI. Rt elbow abrasion, scabbed - AGUILA.   TESTS/PROCEDURES: none   D/C DATE: pending TCU placement  OTHER IMPORTANT INFORMATION: 4.1L taken off yesterday during paracentesis. PT/OT/GI following. On strict I&O.

## 2021-06-17 NOTE — PLAN OF CARE
Attempted PT treatment, patient preparing for discharge home. Discussed PT prior to discharge as he has not attempted stairs navigation. Patient declined stating he feels fine with his mobility despite not having practiced stairs during hospital stay.     Physical Therapy Discharge Summary    Reason for therapy discharge:    Discharged to home with home therapy.    Progress towards therapy goal(s). See goals on Care Plan in [facility] record for goal details.  Goals partially met.  Barriers to achieving goals:   discharge from facility.    Therapy recommendation(s):    Continued therapy is recommended.  Rationale/Recommendations: TCU recommended but he was not not accepted, he will have HHPT to address mobility deficits.

## 2021-06-17 NOTE — DISCHARGE INSTRUCTIONS
[facility]  FOR NURSING, PHYSICAL THERAPY AND OCCUPATIONAL THERAPY  START OF CARE WILL BE ON Friday.  STAFF WILL CALL YOU TO SET UP  THE TIME. Their number is [phone].  IF YOU DO NOT HEAR FROM HOME CARE ON Friday, CALL ANDREW RENNER [phone].    JEN JUAN MANUELBRADFORD WILL START YOUR MEAL ON WHEELS. SHE KNOWS YOU ARE GOING HOME TODAY.    A REPRESENTATIVE FROM [facility] WILL CALL YOU TO EXPLAIN THE SYSTEM AND COST.

## 2021-06-21 DIAGNOSIS — Z11.59 ENCOUNTER FOR SCREENING FOR OTHER VIRAL DISEASES: Primary | ICD-10-CM

## 2021-06-23 ENCOUNTER — TELEPHONE (OUTPATIENT)
Dept: MEDSURG UNIT | Facility: CLINIC | Age: 66
End: 2021-06-23

## 2021-06-23 NOTE — TELEPHONE ENCOUNTER
RN called and left voicemail for patient as it does not appear that a COVID test has been completed. RN provided info that a COVID test is required prior to scheduled procedure. Call back number provided.

## 2021-06-25 ENCOUNTER — HOSPITAL ENCOUNTER (INPATIENT)
Facility: CLINIC | Age: 66
LOS: 18 days | Discharge: NURSING, REHAB OR LONG-TERM CARE | DRG: 432 | End: 2021-07-14
Attending: EMERGENCY MEDICINE | Admitting: HOSPITALIST
Payer: MEDICARE

## 2021-06-25 ENCOUNTER — APPOINTMENT (OUTPATIENT)
Dept: GENERAL RADIOLOGY | Facility: CLINIC | Age: 66
DRG: 432 | End: 2021-06-25
Attending: EMERGENCY MEDICINE
Payer: MEDICARE

## 2021-06-25 DIAGNOSIS — G47.00 INSOMNIA, UNSPECIFIED TYPE: ICD-10-CM

## 2021-06-25 DIAGNOSIS — F51.04 PSYCHOPHYSIOLOGICAL INSOMNIA: ICD-10-CM

## 2021-06-25 DIAGNOSIS — R10.32 ABDOMINAL PAIN, LEFT LOWER QUADRANT: ICD-10-CM

## 2021-06-25 DIAGNOSIS — A41.81 ENTEROCOCCAL SEPSIS (H): Primary | ICD-10-CM

## 2021-06-25 DIAGNOSIS — F10.920 ALCOHOLIC INTOXICATION WITHOUT COMPLICATION (H): ICD-10-CM

## 2021-06-25 DIAGNOSIS — N40.1 BENIGN PROSTATIC HYPERPLASIA WITH URINARY FREQUENCY: ICD-10-CM

## 2021-06-25 DIAGNOSIS — K70.11 ALCOHOLIC HEPATITIS WITH ASCITES (H): ICD-10-CM

## 2021-06-25 DIAGNOSIS — J44.9 CHRONIC OBSTRUCTIVE PULMONARY DISEASE, UNSPECIFIED COPD TYPE (H): ICD-10-CM

## 2021-06-25 DIAGNOSIS — B35.4 TINEA CORPORIS: ICD-10-CM

## 2021-06-25 DIAGNOSIS — Z71.6 ENCOUNTER FOR SMOKING CESSATION COUNSELING: ICD-10-CM

## 2021-06-25 DIAGNOSIS — R06.02 SHORTNESS OF BREATH: ICD-10-CM

## 2021-06-25 DIAGNOSIS — R35.0 BENIGN PROSTATIC HYPERPLASIA WITH URINARY FREQUENCY: ICD-10-CM

## 2021-06-25 LAB
ABO + RH BLD: NORMAL
ABO + RH BLD: NORMAL
ALBUMIN SERPL-MCNC: 3 G/DL (ref 3.4–5)
ALP SERPL-CCNC: 218 U/L (ref 40–150)
ALT SERPL W P-5'-P-CCNC: 22 U/L (ref 0–70)
AMMONIA PLAS-SCNC: 33 UMOL/L (ref 10–50)
ANION GAP SERPL CALCULATED.3IONS-SCNC: 12 MMOL/L (ref 3–14)
AST SERPL W P-5'-P-CCNC: 34 U/L (ref 0–45)
BASOPHILS # BLD AUTO: 0.1 10E9/L (ref 0–0.2)
BASOPHILS NFR BLD AUTO: 0.9 %
BILIRUB SERPL-MCNC: 0.7 MG/DL (ref 0.2–1.3)
BLD GP AB SCN SERPL QL: NORMAL
BLOOD BANK CMNT PATIENT-IMP: NORMAL
BUN SERPL-MCNC: 32 MG/DL (ref 7–30)
CALCIUM SERPL-MCNC: 8 MG/DL (ref 8.5–10.1)
CHLORIDE SERPL-SCNC: 114 MMOL/L (ref 94–109)
CO2 SERPL-SCNC: 16 MMOL/L (ref 20–32)
CREAT SERPL-MCNC: 2.08 MG/DL (ref 0.66–1.25)
DIFFERENTIAL METHOD BLD: ABNORMAL
EOSINOPHIL # BLD AUTO: 0 10E9/L (ref 0–0.7)
EOSINOPHIL NFR BLD AUTO: 0.1 %
ERYTHROCYTE [DISTWIDTH] IN BLOOD BY AUTOMATED COUNT: 19.2 % (ref 10–15)
ETHANOL SERPL-MCNC: 0.34 G/DL
GFR SERPL CREATININE-BSD FRML MDRD: 32 ML/MIN/{1.73_M2}
GLUCOSE BLDC GLUCOMTR-MCNC: 121 MG/DL (ref 70–99)
GLUCOSE SERPL-MCNC: 63 MG/DL (ref 70–99)
HCT VFR BLD AUTO: 26.8 % (ref 40–53)
HGB BLD-MCNC: 8.4 G/DL (ref 13.3–17.7)
IMM GRANULOCYTES # BLD: 0 10E9/L (ref 0–0.4)
IMM GRANULOCYTES NFR BLD: 0.5 %
INR PPP: 1.24 (ref 0.86–1.14)
INTERPRETATION ECG - MUSE: NORMAL
LABORATORY COMMENT REPORT: NORMAL
LACTATE BLD-SCNC: 3.6 MMOL/L (ref 0.7–2)
LYMPHOCYTES # BLD AUTO: 0.5 10E9/L (ref 0.8–5.3)
LYMPHOCYTES NFR BLD AUTO: 7.1 %
MCH RBC QN AUTO: 30.1 PG (ref 26.5–33)
MCHC RBC AUTO-ENTMCNC: 31.3 G/DL (ref 31.5–36.5)
MCV RBC AUTO: 96 FL (ref 78–100)
MONOCYTES # BLD AUTO: 0.3 10E9/L (ref 0–1.3)
MONOCYTES NFR BLD AUTO: 3.3 %
NEUTROPHILS # BLD AUTO: 6.7 10E9/L (ref 1.6–8.3)
NEUTROPHILS NFR BLD AUTO: 88.1 %
NRBC # BLD AUTO: 0 10*3/UL
NRBC BLD AUTO-RTO: 0 /100
PLATELET # BLD AUTO: 231 10E9/L (ref 150–450)
POTASSIUM SERPL-SCNC: 4.1 MMOL/L (ref 3.4–5.3)
PROT SERPL-MCNC: 7.1 G/DL (ref 6.8–8.8)
RBC # BLD AUTO: 2.79 10E12/L (ref 4.4–5.9)
SARS-COV-2 RNA RESP QL NAA+PROBE: NEGATIVE
SODIUM SERPL-SCNC: 142 MMOL/L (ref 133–144)
SPECIMEN EXP DATE BLD: NORMAL
SPECIMEN SOURCE: NORMAL
WBC # BLD AUTO: 7.6 10E9/L (ref 4–11)

## 2021-06-25 PROCEDURE — 85025 COMPLETE CBC W/AUTO DIFF WBC: CPT | Performed by: EMERGENCY MEDICINE

## 2021-06-25 PROCEDURE — G0378 HOSPITAL OBSERVATION PER HR: HCPCS

## 2021-06-25 PROCEDURE — 250N000011 HC RX IP 250 OP 636: Performed by: EMERGENCY MEDICINE

## 2021-06-25 PROCEDURE — 96376 TX/PRO/DX INJ SAME DRUG ADON: CPT

## 2021-06-25 PROCEDURE — 86900 BLOOD TYPING SEROLOGIC ABO: CPT | Performed by: EMERGENCY MEDICINE

## 2021-06-25 PROCEDURE — C9803 HOPD COVID-19 SPEC COLLECT: HCPCS

## 2021-06-25 PROCEDURE — 999N001017 HC STATISTIC GLUCOSE BY METER IP

## 2021-06-25 PROCEDURE — 99207 PR CDG-CODE CATEGORY CHANGED: CPT | Performed by: HOSPITALIST

## 2021-06-25 PROCEDURE — 250N000013 HC RX MED GY IP 250 OP 250 PS 637: Performed by: HOSPITALIST

## 2021-06-25 PROCEDURE — 258N000001 HC RX 258: Performed by: EMERGENCY MEDICINE

## 2021-06-25 PROCEDURE — 82077 ASSAY SPEC XCP UR&BREATH IA: CPT | Performed by: EMERGENCY MEDICINE

## 2021-06-25 PROCEDURE — 87635 SARS-COV-2 COVID-19 AMP PRB: CPT | Performed by: EMERGENCY MEDICINE

## 2021-06-25 PROCEDURE — 80053 COMPREHEN METABOLIC PANEL: CPT | Performed by: EMERGENCY MEDICINE

## 2021-06-25 PROCEDURE — 99220 PR INITIAL OBSERVATION CARE,LEVEL III: CPT | Mod: AI | Performed by: HOSPITALIST

## 2021-06-25 PROCEDURE — 82140 ASSAY OF AMMONIA: CPT | Performed by: EMERGENCY MEDICINE

## 2021-06-25 PROCEDURE — 86901 BLOOD TYPING SEROLOGIC RH(D): CPT | Performed by: EMERGENCY MEDICINE

## 2021-06-25 PROCEDURE — 99285 EMERGENCY DEPT VISIT HI MDM: CPT | Mod: 25

## 2021-06-25 PROCEDURE — 86850 RBC ANTIBODY SCREEN: CPT | Performed by: EMERGENCY MEDICINE

## 2021-06-25 PROCEDURE — 83605 ASSAY OF LACTIC ACID: CPT | Performed by: EMERGENCY MEDICINE

## 2021-06-25 PROCEDURE — 71046 X-RAY EXAM CHEST 2 VIEWS: CPT

## 2021-06-25 PROCEDURE — 93005 ELECTROCARDIOGRAM TRACING: CPT

## 2021-06-25 PROCEDURE — 96374 THER/PROPH/DIAG INJ IV PUSH: CPT

## 2021-06-25 PROCEDURE — 96375 TX/PRO/DX INJ NEW DRUG ADDON: CPT

## 2021-06-25 PROCEDURE — 85610 PROTHROMBIN TIME: CPT | Performed by: EMERGENCY MEDICINE

## 2021-06-25 RX ORDER — OXYCODONE HYDROCHLORIDE 5 MG/1
5-10 TABLET ORAL
Status: DISCONTINUED | OUTPATIENT
Start: 2021-06-25 | End: 2021-07-10

## 2021-06-25 RX ORDER — FLUMAZENIL 0.1 MG/ML
0.2 INJECTION, SOLUTION INTRAVENOUS
Status: DISCONTINUED | OUTPATIENT
Start: 2021-06-25 | End: 2021-07-14 | Stop reason: HOSPADM

## 2021-06-25 RX ORDER — HYDROMORPHONE HYDROCHLORIDE 1 MG/ML
0.5 INJECTION, SOLUTION INTRAMUSCULAR; INTRAVENOUS; SUBCUTANEOUS
Status: DISCONTINUED | OUTPATIENT
Start: 2021-06-25 | End: 2021-06-25

## 2021-06-25 RX ORDER — HALOPERIDOL 5 MG/ML
2.5-5 INJECTION INTRAMUSCULAR EVERY 6 HOURS PRN
Status: DISCONTINUED | OUTPATIENT
Start: 2021-06-25 | End: 2021-07-04

## 2021-06-25 RX ORDER — MAGNESIUM OXIDE 400 MG/1
400 TABLET ORAL DAILY
Status: DISCONTINUED | OUTPATIENT
Start: 2021-06-26 | End: 2021-07-13

## 2021-06-25 RX ORDER — LORAZEPAM 1 MG/1
1-2 TABLET ORAL EVERY 30 MIN PRN
Status: DISCONTINUED | OUTPATIENT
Start: 2021-06-25 | End: 2021-07-04

## 2021-06-25 RX ORDER — NICOTINE 21 MG/24HR
1 PATCH, TRANSDERMAL 24 HOURS TRANSDERMAL EVERY 24 HOURS
Status: DISCONTINUED | OUTPATIENT
Start: 2021-06-25 | End: 2021-07-12

## 2021-06-25 RX ORDER — NALOXONE HYDROCHLORIDE 0.4 MG/ML
0.4 INJECTION, SOLUTION INTRAMUSCULAR; INTRAVENOUS; SUBCUTANEOUS
Status: DISCONTINUED | OUTPATIENT
Start: 2021-06-25 | End: 2021-07-14 | Stop reason: HOSPADM

## 2021-06-25 RX ORDER — TAMSULOSIN HYDROCHLORIDE 0.4 MG/1
0.8 CAPSULE ORAL DAILY
Status: DISCONTINUED | OUTPATIENT
Start: 2021-06-26 | End: 2021-07-14 | Stop reason: HOSPADM

## 2021-06-25 RX ORDER — QUETIAPINE FUMARATE 25 MG/1
50 TABLET, FILM COATED ORAL 3 TIMES DAILY PRN
Status: DISCONTINUED | OUTPATIENT
Start: 2021-06-25 | End: 2021-07-14 | Stop reason: HOSPADM

## 2021-06-25 RX ORDER — NALOXONE HYDROCHLORIDE 0.4 MG/ML
0.2 INJECTION, SOLUTION INTRAMUSCULAR; INTRAVENOUS; SUBCUTANEOUS
Status: DISCONTINUED | OUTPATIENT
Start: 2021-06-25 | End: 2021-07-14 | Stop reason: HOSPADM

## 2021-06-25 RX ORDER — LORAZEPAM 2 MG/ML
1-2 INJECTION INTRAMUSCULAR EVERY 30 MIN PRN
Status: DISCONTINUED | OUTPATIENT
Start: 2021-06-25 | End: 2021-07-04

## 2021-06-25 RX ORDER — LANOLIN ALCOHOL/MO/W.PET/CERES
100 CREAM (GRAM) TOPICAL DAILY
Status: DISCONTINUED | OUTPATIENT
Start: 2021-07-03 | End: 2021-07-10

## 2021-06-25 RX ORDER — ESZOPICLONE 1 MG/1
2 TABLET, FILM COATED ORAL
Status: DISCONTINUED | OUTPATIENT
Start: 2021-06-25 | End: 2021-07-13

## 2021-06-25 RX ORDER — OLANZAPINE 5 MG/1
5-10 TABLET, ORALLY DISINTEGRATING ORAL EVERY 6 HOURS PRN
Status: DISCONTINUED | OUTPATIENT
Start: 2021-06-25 | End: 2021-07-04

## 2021-06-25 RX ORDER — MULTIPLE VITAMINS W/ MINERALS TAB 9MG-400MCG
1 TAB ORAL DAILY
Status: DISCONTINUED | OUTPATIENT
Start: 2021-06-26 | End: 2021-06-25

## 2021-06-25 RX ORDER — ALBUTEROL SULFATE 90 UG/1
1-2 AEROSOL, METERED RESPIRATORY (INHALATION) EVERY 4 HOURS PRN
Status: DISCONTINUED | OUTPATIENT
Start: 2021-06-25 | End: 2021-07-14 | Stop reason: HOSPADM

## 2021-06-25 RX ORDER — PANTOPRAZOLE SODIUM 40 MG/1
40 TABLET, DELAYED RELEASE ORAL 2 TIMES DAILY
Status: DISCONTINUED | OUTPATIENT
Start: 2021-06-25 | End: 2021-07-14 | Stop reason: HOSPADM

## 2021-06-25 RX ORDER — QUETIAPINE FUMARATE 200 MG/1
200 TABLET, FILM COATED ORAL AT BEDTIME
Status: DISCONTINUED | OUTPATIENT
Start: 2021-06-25 | End: 2021-07-14 | Stop reason: HOSPADM

## 2021-06-25 RX ORDER — TRAZODONE HYDROCHLORIDE 100 MG/1
100 TABLET ORAL AT BEDTIME
Status: DISCONTINUED | OUTPATIENT
Start: 2021-06-25 | End: 2021-07-13

## 2021-06-25 RX ORDER — MIDODRINE HYDROCHLORIDE 2.5 MG/1
2.5 TABLET ORAL 3 TIMES DAILY
Status: DISCONTINUED | OUTPATIENT
Start: 2021-06-25 | End: 2021-06-29

## 2021-06-25 RX ORDER — LANOLIN ALCOHOL/MO/W.PET/CERES
100 CREAM (GRAM) TOPICAL 3 TIMES DAILY
Status: COMPLETED | OUTPATIENT
Start: 2021-06-27 | End: 2021-07-02

## 2021-06-25 RX ORDER — MULTIPLE VITAMINS W/ MINERALS TAB 9MG-400MCG
1 TAB ORAL DAILY
Status: DISCONTINUED | OUTPATIENT
Start: 2021-06-26 | End: 2021-07-14 | Stop reason: HOSPADM

## 2021-06-25 RX ORDER — ACETAMINOPHEN 325 MG/1
650 TABLET ORAL EVERY 4 HOURS PRN
Status: DISCONTINUED | OUTPATIENT
Start: 2021-06-25 | End: 2021-06-29

## 2021-06-25 RX ORDER — ONDANSETRON 2 MG/ML
4 INJECTION INTRAMUSCULAR; INTRAVENOUS EVERY 6 HOURS PRN
Status: DISCONTINUED | OUTPATIENT
Start: 2021-06-25 | End: 2021-07-14 | Stop reason: HOSPADM

## 2021-06-25 RX ORDER — LIDOCAINE 40 MG/G
CREAM TOPICAL
Status: DISCONTINUED | OUTPATIENT
Start: 2021-06-25 | End: 2021-07-12

## 2021-06-25 RX ORDER — FOLIC ACID 1 MG/1
1 TABLET ORAL DAILY
Status: DISCONTINUED | OUTPATIENT
Start: 2021-06-26 | End: 2021-06-25

## 2021-06-25 RX ORDER — GABAPENTIN 300 MG/1
300 CAPSULE ORAL 3 TIMES DAILY
Status: DISCONTINUED | OUTPATIENT
Start: 2021-06-25 | End: 2021-07-07

## 2021-06-25 RX ORDER — ACETAMINOPHEN 650 MG/1
650 SUPPOSITORY RECTAL EVERY 4 HOURS PRN
Status: DISCONTINUED | OUTPATIENT
Start: 2021-06-25 | End: 2021-06-29

## 2021-06-25 RX ORDER — MIRTAZAPINE 15 MG/1
30 TABLET, FILM COATED ORAL AT BEDTIME
Status: DISCONTINUED | OUTPATIENT
Start: 2021-06-25 | End: 2021-07-14 | Stop reason: HOSPADM

## 2021-06-25 RX ORDER — ONDANSETRON 4 MG/1
4 TABLET, ORALLY DISINTEGRATING ORAL EVERY 6 HOURS PRN
Status: DISCONTINUED | OUTPATIENT
Start: 2021-06-25 | End: 2021-07-14 | Stop reason: HOSPADM

## 2021-06-25 RX ORDER — DEXTROSE MONOHYDRATE 25 G/50ML
25 INJECTION, SOLUTION INTRAVENOUS ONCE
Status: COMPLETED | OUTPATIENT
Start: 2021-06-25 | End: 2021-06-25

## 2021-06-25 RX ORDER — LANOLIN ALCOHOL/MO/W.PET/CERES
200 CREAM (GRAM) TOPICAL 3 TIMES DAILY
Status: COMPLETED | OUTPATIENT
Start: 2021-06-25 | End: 2021-06-27

## 2021-06-25 RX ORDER — FOLIC ACID 1 MG/1
1 TABLET ORAL DAILY
Status: DISCONTINUED | OUTPATIENT
Start: 2021-06-26 | End: 2021-07-13

## 2021-06-25 RX ADMIN — GABAPENTIN 300 MG: 300 CAPSULE ORAL at 23:17

## 2021-06-25 RX ADMIN — LORAZEPAM 1 MG: 1 TABLET ORAL at 23:26

## 2021-06-25 RX ADMIN — MIRTAZAPINE 30 MG: 15 TABLET, FILM COATED ORAL at 23:16

## 2021-06-25 RX ADMIN — DEXTROSE MONOHYDRATE 25 ML: 500 INJECTION PARENTERAL at 20:10

## 2021-06-25 RX ADMIN — TRAZODONE HYDROCHLORIDE 100 MG: 100 TABLET ORAL at 23:16

## 2021-06-25 RX ADMIN — QUETIAPINE 200 MG: 200 TABLET, FILM COATED ORAL at 23:17

## 2021-06-25 RX ADMIN — HYDROMORPHONE HYDROCHLORIDE 0.5 MG: 1 INJECTION, SOLUTION INTRAMUSCULAR; INTRAVENOUS; SUBCUTANEOUS at 21:16

## 2021-06-25 RX ADMIN — OXYCODONE HYDROCHLORIDE 5 MG: 5 TABLET ORAL at 23:17

## 2021-06-25 RX ADMIN — PANTOPRAZOLE SODIUM 40 MG: 40 TABLET, DELAYED RELEASE ORAL at 23:16

## 2021-06-25 RX ADMIN — NICOTINE 1 PATCH: 21 PATCH, EXTENDED RELEASE TRANSDERMAL at 23:18

## 2021-06-25 RX ADMIN — HYDROMORPHONE HYDROCHLORIDE 0.5 MG: 1 INJECTION, SOLUTION INTRAMUSCULAR; INTRAVENOUS; SUBCUTANEOUS at 20:10

## 2021-06-25 ASSESSMENT — MIFFLIN-ST. JEOR
SCORE: 1595.46
SCORE: 1538.31

## 2021-06-25 ASSESSMENT — ENCOUNTER SYMPTOMS
CONSTIPATION: 1
ABDOMINAL PAIN: 1
SHORTNESS OF BREATH: 1

## 2021-06-26 ENCOUNTER — APPOINTMENT (OUTPATIENT)
Dept: ULTRASOUND IMAGING | Facility: CLINIC | Age: 66
DRG: 432 | End: 2021-06-26
Attending: INTERNAL MEDICINE
Payer: MEDICARE

## 2021-06-26 LAB — GLUCOSE BLDC GLUCOMTR-MCNC: 73 MG/DL (ref 70–99)

## 2021-06-26 PROCEDURE — 250N000009 HC RX 250: Performed by: HOSPITALIST

## 2021-06-26 PROCEDURE — G0378 HOSPITAL OBSERVATION PER HR: HCPCS

## 2021-06-26 PROCEDURE — 49083 ABD PARACENTESIS W/IMAGING: CPT

## 2021-06-26 PROCEDURE — 250N000013 HC RX MED GY IP 250 OP 250 PS 637: Performed by: HOSPITALIST

## 2021-06-26 PROCEDURE — 99232 SBSQ HOSP IP/OBS MODERATE 35: CPT | Performed by: INTERNAL MEDICINE

## 2021-06-26 PROCEDURE — 250N000011 HC RX IP 250 OP 636: Performed by: HOSPITALIST

## 2021-06-26 PROCEDURE — 120N000001 HC R&B MED SURG/OB

## 2021-06-26 PROCEDURE — 999N001017 HC STATISTIC GLUCOSE BY METER IP

## 2021-06-26 PROCEDURE — 0W9G3ZZ DRAINAGE OF PERITONEAL CAVITY, PERCUTANEOUS APPROACH: ICD-10-PCS | Performed by: RADIOLOGY

## 2021-06-26 PROCEDURE — 250N000013 HC RX MED GY IP 250 OP 250 PS 637: Performed by: INTERNAL MEDICINE

## 2021-06-26 RX ORDER — TAMSULOSIN HYDROCHLORIDE 0.4 MG/1
0.4 CAPSULE ORAL DAILY
Status: DISCONTINUED | OUTPATIENT
Start: 2021-06-26 | End: 2021-06-26

## 2021-06-26 RX ORDER — FINASTERIDE 5 MG/1
5 TABLET, FILM COATED ORAL DAILY
Status: DISCONTINUED | OUTPATIENT
Start: 2021-06-26 | End: 2021-07-14 | Stop reason: HOSPADM

## 2021-06-26 RX ORDER — LIDOCAINE HYDROCHLORIDE 10 MG/ML
10 INJECTION, SOLUTION EPIDURAL; INFILTRATION; INTRACAUDAL; PERINEURAL ONCE
Status: COMPLETED | OUTPATIENT
Start: 2021-06-26 | End: 2021-06-26

## 2021-06-26 RX ADMIN — NICOTINE 1 PATCH: 21 PATCH, EXTENDED RELEASE TRANSDERMAL at 23:05

## 2021-06-26 RX ADMIN — GABAPENTIN 300 MG: 300 CAPSULE ORAL at 08:29

## 2021-06-26 RX ADMIN — ONDANSETRON 4 MG: 2 INJECTION INTRAMUSCULAR; INTRAVENOUS at 05:09

## 2021-06-26 RX ADMIN — LORAZEPAM 1 MG: 1 TABLET ORAL at 05:11

## 2021-06-26 RX ADMIN — THIAMINE HCL TAB 100 MG 200 MG: 100 TAB at 00:45

## 2021-06-26 RX ADMIN — MIRTAZAPINE 30 MG: 15 TABLET, FILM COATED ORAL at 21:56

## 2021-06-26 RX ADMIN — LORAZEPAM 1 MG: 1 TABLET ORAL at 17:42

## 2021-06-26 RX ADMIN — FINASTERIDE 5 MG: 5 TABLET, FILM COATED ORAL at 15:33

## 2021-06-26 RX ADMIN — PANTOPRAZOLE SODIUM 40 MG: 40 TABLET, DELAYED RELEASE ORAL at 08:29

## 2021-06-26 RX ADMIN — OXYCODONE HYDROCHLORIDE 5 MG: 5 TABLET ORAL at 23:02

## 2021-06-26 RX ADMIN — TRAZODONE HYDROCHLORIDE 100 MG: 100 TABLET ORAL at 21:56

## 2021-06-26 RX ADMIN — MULTIPLE VITAMINS W/ MINERALS TAB 1 TABLET: TAB at 08:29

## 2021-06-26 RX ADMIN — LORAZEPAM 1 MG: 1 TABLET ORAL at 20:53

## 2021-06-26 RX ADMIN — VORTIOXETINE 10 MG: 10 TABLET, FILM COATED ORAL at 20:00

## 2021-06-26 RX ADMIN — LIDOCAINE HYDROCHLORIDE 10 ML: 10 INJECTION, SOLUTION EPIDURAL; INFILTRATION; INTRACAUDAL; PERINEURAL at 13:24

## 2021-06-26 RX ADMIN — LORAZEPAM 1 MG: 1 TABLET ORAL at 15:51

## 2021-06-26 RX ADMIN — THIAMINE HCL TAB 100 MG 200 MG: 100 TAB at 15:33

## 2021-06-26 RX ADMIN — MICONAZOLE NITRATE: 20 POWDER TOPICAL at 11:47

## 2021-06-26 RX ADMIN — THIAMINE HCL TAB 100 MG 200 MG: 100 TAB at 08:29

## 2021-06-26 RX ADMIN — VORTIOXETINE 10 MG: 10 TABLET, FILM COATED ORAL at 08:29

## 2021-06-26 RX ADMIN — OXYCODONE HYDROCHLORIDE 5 MG: 5 TABLET ORAL at 10:49

## 2021-06-26 RX ADMIN — QUETIAPINE 200 MG: 200 TABLET, FILM COATED ORAL at 21:56

## 2021-06-26 RX ADMIN — OXYCODONE HYDROCHLORIDE 5 MG: 5 TABLET ORAL at 06:47

## 2021-06-26 RX ADMIN — PANTOPRAZOLE SODIUM 40 MG: 40 TABLET, DELAYED RELEASE ORAL at 20:00

## 2021-06-26 RX ADMIN — TAMSULOSIN HYDROCHLORIDE 0.8 MG: 0.4 CAPSULE ORAL at 08:29

## 2021-06-26 RX ADMIN — LORAZEPAM 1 MG: 1 TABLET ORAL at 19:51

## 2021-06-26 RX ADMIN — MAGNESIUM OXIDE TAB 400 MG (241.3 MG ELEMENTAL MG) 400 MG: 400 (241.3 MG) TAB at 08:29

## 2021-06-26 RX ADMIN — GABAPENTIN 300 MG: 300 CAPSULE ORAL at 15:33

## 2021-06-26 RX ADMIN — THIAMINE HCL TAB 100 MG 200 MG: 100 TAB at 21:57

## 2021-06-26 RX ADMIN — MICONAZOLE NITRATE: 20 POWDER TOPICAL at 20:00

## 2021-06-26 RX ADMIN — FOLIC ACID 1 MG: 1 TABLET ORAL at 08:29

## 2021-06-26 RX ADMIN — VORTIOXETINE 10 MG: 10 TABLET, FILM COATED ORAL at 00:45

## 2021-06-26 RX ADMIN — GABAPENTIN 300 MG: 300 CAPSULE ORAL at 21:56

## 2021-06-26 RX ADMIN — LORAZEPAM 1 MG: 1 TABLET ORAL at 14:39

## 2021-06-26 ASSESSMENT — ACTIVITIES OF DAILY LIVING (ADL): ADLS_ACUITY_SCORE: 11

## 2021-06-26 NOTE — PHARMACY-ADMISSION MEDICATION HISTORY
Pharmacy Medication History  Admission medication history interview status for the 6/25/2021  admission is complete. See EPIC admission navigator for prior to admission medications     Location of Interview: Patient room  Medication history sources: Patient    Significant changes made to the medication list:  Removed diazepam per pt request    Medication reconciliation completed by provider prior to medication history? No    Time spent in this activity: 10 minutes    Prior to Admission medications    Medication Sig Last Dose Taking? Auth Provider   albuterol (PROAIR HFA/PROVENTIL HFA/VENTOLIN HFA) 108 (90 Base) MCG/ACT inhaler Inhale 1-2 puffs into the lungs every 4 hours as needed for shortness of breath / dyspnea or wheezing prn med Yes Unknown, Entered By History   eszopiclone (LUNESTA) 3 MG tablet Take 3 mg by mouth nightly as needed for sleep prn med Yes Unknown, Entered By History   fluticasone-vilanterol (BREO ELLIPTA) 200-25 MCG/INH inhaler Inhale 1 puff into the lungs daily as needed (SOB) prn med Yes Jeffrey Villagomez MD   folic acid (FOLVITE) 1 MG tablet Take 1 mg by mouth daily Past Week at Unknown time Yes Unknown, Entered By History   gabapentin (NEURONTIN) 300 MG capsule Take 300 mg by mouth 3 times daily Past Week at Unknown time Yes Unknown, Entered By History   magnesium oxide (MAG-OX) 400 (240 Mg) MG tablet Take 400 mg by mouth daily Past Week at Unknown time Yes Unknown, Entered By History   midodrine (PROAMATINE) 2.5 MG tablet Take 2.5 mg by mouth 3 times daily Past Week at Unknown time Yes Unknown, Entered By History   mirtazapine (REMERON) 30 MG tablet Take 30 mg by mouth At Bedtime Filled 1/11/21 for #30 Past Week at Unknown time Yes Unknown, Entered By History   multivitamin w/minerals (THERA-VIT-M) tablet Take 1 tablet by mouth daily  Patient taking differently: Take 1 tablet by mouth daily Filled 9/21/21 #30 Past Week at Unknown time Yes Jeffrey Villagomez MD   pantoprazole  (PROTONIX) 40 MG EC tablet Take 1 tablet (40 mg) by mouth 2 times daily  Yes Sara Orellana MD   QUEtiapine (SEROQUEL) 200 MG tablet Take 200 mg by mouth At Bedtime Filled 1/11/21 #30   Yes Unknown, Entered By History   QUEtiapine (SEROQUEL) 50 MG tablet Take 1 tablet (50 mg) by mouth 3 times daily as needed (anxiety) Filled 11/20/20 #30 prn med Yes Sara Orellana MD   tamsulosin (FLOMAX) 0.4 MG capsule Take 2 capsules (0.8 mg) by mouth daily Past Week at Unknown time Yes Sara Orellana MD   traZODone (DESYREL) 100 MG tablet Take 100 mg by mouth At Bedtime Filled 1/11/21 #30 Past Week at Unknown time Yes Unknown, Entered By History   vortioxetine (TRINTELLIX) 10 MG tablet Take 10 mg by mouth 2 times daily Past Month at Unknown time Yes Unknown, Entered By History   nicotine (NICODERM CQ) 21 MG/24HR 24 hr patch Place 1 patch onto the skin every 24 hours   Unknown, Entered By History       The information provided in this note is only as accurate as the sources available at the time of update(s)   Adela Yadav PharmD

## 2021-06-26 NOTE — ED NOTES
"[facility]  ED Nurse Handoff Report    ED Chief complaint: Shortness of Breath      ED Diagnosis:   Final diagnoses:   Alcoholic hepatitis with ascites   Shortness of breath   Alcoholic intoxication without complication (H)       Code Status: to be discussed with admitting hospitalist    Allergies:   Allergies   Allergen Reactions     Amlodipine Swelling     Lisinopril      Other reaction(s): Angioedema  Mouth and tongue swelling   Mouth and tongue swelling          Patient Story: patient presents with SOB and abdominal pain. Typically has weekly paracentesis, but missed this week's appointment.   Focused Assessment:  Patient is drunk, drinks daily. Patient has distended and taut abdomen, states very painful and makes it hard to breathe. Plan for admit and probable IR visit tomorrow.     Treatments and/or interventions provided: pain meds, labs, imaging, per plan of care ED  Patient's response to treatments and/or interventions: tolerating, painful abdomen persists    To be done/followed up on inpatient unit:  plan for IR tomorrow    Does this patient have any cognitive concerns?: NA    Activity level - Baseline/Home:  Independent  Activity Level - Current:   Stand with Assist    Patient's Preferred language: English   Needed?: No    Isolation: None  Infection: Not Applicable  Patient tested for COVID 19 prior to admission: YES  Bariatric?: No    Vital Signs:   Vitals:    06/25/21 1920 06/25/21 2000 06/25/21 2030   BP: 138/77 134/75 (!) 158/101   Pulse: 88 81 85   Resp: 20  20   Temp: 98.4  F (36.9  C)     TempSrc: Oral     SpO2: 95% 93% 94%   Weight: 86.2 kg (190 lb)     Height: 1.702 m (5' 7\")         Cardiac Rhythm:     Was the PSS-3 completed:   Yes  What interventions are required if any?               Family Comments: NA  OBS brochure/video discussed/provided to patient/family: No              Name of person given brochure if not patient:                Relationship to patient:  "     For the majority of the shift this patient's behavior was Green.   Behavioral interventions performed were NA.    ED NURSE PHONE NUMBER: [phone]

## 2021-06-26 NOTE — PROGRESS NOTES
[facility]    Hospitalist Progress Note    Brief Summary:   Jim Richard is a 66 year old male chronic alcoholism, cirrhosis and ascites.  He undergoes weekly paracenteses but missed his procedure yesterday because he was out drinking alcohol.  He presented to the emergency room today complaining of abdominal distention.  He was found to be intoxicated.  He is going to be admitted so he can undergo a therapeutic paracentesis.    Assessment & Plan      Massive Ascites recurrent   Cirrhosis, portal hypertension  Patient missed his weekly paracentesis and he has now makedly distended abdomen, and have some SOB and discomfort because of that. He need therapeutic paracentesis. US Paracentesis is consulted for that.  Discussed with Dr Pizarro of IR and he will do the Paracentesis today .  He is not on diuretics as diuresis worsen his kidney functions.      Alcohol dependence and intoxication   Anxiety and depression   He was intoxicated in the emergency room, ethanol level 0.34.  He is on multiple psychiatric medications.  His is more sober now, keep him on alcohol withdrawal, avoid IV fluids at this time.     Continue prior to admission Lunesta, Neurontin, Remeron, Seroquel, Desyrel and Trintellix    Monitor for signs of alcohol withdrawal.   Counseling given.      Chronic obstructive lung disease     Continue prior to admission inhaled bronchodilators    Not in      Coronary artery disease   Chronic hypotension  He is not on aspirin or a beta-blocker.    Continue midodrine     Obstructive sleep apnea     CPAP as at home     Chronic kidney disease  Lactic acidosis  Creatinine is at baseline (2.0-2.2).  Lactic acid is elevated due to his liver disease not sepsis.     Hypoglycemia due to liver dysfunction  This was treated in the emergency room.    Continue to monitor glucometers    No more hypoglycemia at this time.           DVT Prophylaxis: Pneumatic Compression Devices  Code Status: No CPR-  Do NOT Intubate    Disposition: Expected discharge in 1-2 days if remain stable and paracentesis done today     Dhaval Snyder MD  Text Page  (7am - 6pm)    Interval History   Complaints of abdominal pain and distention , some difficulty in breathing because of that, no fever, chills, nausea or vomiting at this time.     No other significant event overnight.     -Data reviewed today: I reviewed all new labs and imaging results over the last 24 hours. I personally reviewed no images or EKG's today.    Physical Exam   Temp: 97.7  F (36.5  C) Temp src: Axillary BP: 113/47 Pulse: 82   Resp: 16 SpO2: 95 % O2 Device: Nasal cannula Oxygen Delivery: 2 LPM  Vitals:    06/25/21 1920 06/25/21 2330   Weight: 86.2 kg (190 lb) 80.5 kg (177 lb 6.4 oz)     Vital Signs with Ranges  Temp:  [97.4  F (36.3  C)-98.7  F (37.1  C)] 97.7  F (36.5  C)  Pulse:  [81-95] 82  Resp:  [15-20] 16  BP: (110-165)/() 113/47  SpO2:  [86 %-97 %] 95 %  I/O last 3 completed shifts:  In: -   Out: 425 [Urine:425]    Constitutional: awake, alert, cooperative, no apparent distress, and appears stated age  Eyes: Lids and lashes normal, pupils equal, round and reactive to light, extra ocular muscles intact, sclera clear, conjunctiva normal  Respiratory: No increased work of breathing, decrease air entry bilaterally, no crackles or wheezing  Cardiovascular: Normal apical impulse, regular rate and rhythm, normal S1 and S2, no S3 or S4, and no murmur noted  GI: No scars, normal bowel sounds, soft, markedly distended, mild tenderness, Umbilical hernia present, no masses palpated, no hepatosplenomegally  Musculoskeletal: 2+ lower extremity pitting edema present  Neurologic: no focal deficit.     Medications       folic acid  1 mg Oral Daily     gabapentin  300 mg Oral TID     magnesium oxide  400 mg Oral Daily     miconazole   Topical BID     [Held by provider] midodrine  2.5 mg Oral TID     mirtazapine  30 mg Oral At Bedtime     multivitamin w/minerals  1  tablet Oral Daily     nicotine  1 patch Transdermal Q24H     nicotine   Transdermal Q8H     pantoprazole  40 mg Oral BID     QUEtiapine  200 mg Oral At Bedtime     sodium chloride (PF)  3 mL Intracatheter Q8H     tamsulosin  0.8 mg Oral Daily     thiamine  200 mg Oral TID    Followed by     [START ON 6/27/2021] thiamine  100 mg Oral TID    Followed by     [START ON 7/3/2021] thiamine  100 mg Oral Daily     traZODone  100 mg Oral At Bedtime     vortioxetine  10 mg Oral BID       Data   Recent Labs   Lab 06/25/21  1931   WBC 7.6   HGB 8.4*   MCV 96      INR 1.24*      POTASSIUM 4.1   CHLORIDE 114*   CO2 16*   BUN 32*   CR 2.08*   ANIONGAP 12   KEV 8.0*   GLC 63*   ALBUMIN 3.0*   PROTTOTAL 7.1   BILITOTAL 0.7   ALKPHOS 218*   ALT 22   AST 34       Recent Results (from the past 24 hour(s))   XR Chest 2 Views    Narrative    XR CHEST TWO VIEWS   6/25/2021 7:52 PM     HISTORY: Shortness breath.    COMPARISON: Chest x-ray 6/13/2011.      Impression    IMPRESSION: Two views of the chest. Elevation left hemidiaphragm is  unchanged. Lungs are grossly clear with improving infiltrates right  lung base. Old healed bilateral rib fractures are present. Heart is  normal in size. No pneumothorax or pleural effusions.    ORIN PARSONS MD

## 2021-06-26 NOTE — ED PROVIDER NOTES
History     Chief Complaint:  Shortness of Breath    The history is provided by the patient.      Jim Richard is a 66 year old male with past medical history significant for persistent alcohol abuse, alcoholic cirrhosis, recurrent ascites requiring weekly to biweekly paracentesis, COPD, CAD, esophageal varices, essential hypertension, peripheral vascular disease, pancytopenia, chronic kidney disease or chronic thrombocytopenia and anemia who presents via EMS with shortness of breath. The patient was recently admitted from 6/6-6/17 for abdominal pain and pneumonia. He was scheduled for paracentesis yesterday but missed his appointment because he was drinking. The patient returns to the ED for shortness of breath today. The patient states that he has been short of breath over the last few days. He reports some abdominal pain and distention as well. The patient states he has not had a bowel movement in the last few days. He states that his last drink was 3 hours ago.  He denies any chest pain, fevers, cough, or any other complaints.    Review of Systems   Respiratory: Positive for shortness of breath.    Gastrointestinal: Positive for abdominal pain and constipation.   All other systems reviewed and are negative.      Allergies:  Amlodipine  Lisinopril    Medications:    Albuterol   Valium   Lunesta   Folvite   Gabapentin   Proamatine   Remeron   Protonix   Seroquel   Flomax  Trazodone   Trintellix     Past Medical History:    Alcoholism   Anxiety   COPD   CAD   Depression   Esophageal varices with bleeding   MI   Hepatomegaly  Hypertension   Hyperlipidemia   JANIS  Peripheral vascular disease   Subdural hematoma   Spinal stenosis   Substance abuse   End stage liver disease  Acute renal failure   alcoholic liver disease   NGUYEN   Sucicide attempt   GI bleed     Past Surgical History:    Appendectomy   Femoropopliteal bypass   Enterectomy femoral   Hernia repair   Laminectomy   Tonsillectomy and adenoidectomy  "    Family History:    Mental illness- son   CAD- mother   Substance abuse- father, brother    Lung cancer- father     Social History:  alcohol abuse     Physical Exam     Patient Vitals for the past 24 hrs:   BP Temp Temp src Pulse Resp SpO2 Height Weight   06/25/21 2100 (!) 143/87 -- -- 92 20 97 % -- --   06/25/21 2030 (!) 158/101 -- -- 85 20 94 % -- --   06/25/21 2000 134/75 -- -- 81 -- 93 % -- --   06/25/21 1920 138/77 98.4  F (36.9  C) Oral 88 20 95 % 1.702 m (5' 7\") 86.2 kg (190 lb)       Physical Exam  Nursing note and vitals reviewed.  Constitutional:  Oriented to person, place, and time. Cooperative.   HENT:   Nose:    Nose normal.   Mouth/Throat:   Mucous membranes are normal.   Eyes:    Conjunctivae normal and EOM are normal.      Pupils are equal, round, and reactive to light.   Neck:    Trachea normal.   Cardiovascular:  Normal rate, regular rhythm, normal heart sounds and normal pulses. No murmur heard.  Pulmonary/Chest:  Effort normal and breath sounds normal.   Abdominal:   Distended abdomen with a periumbilical hernia, which is tender to palpation.  Nontender elsewhere.  Musculoskeletal:  Bilateral lower extremity edema present.  Extremities atraumatic x 4.   Lymphadenopathy:  No cervical adenopathy.   Neurological:   Alert and oriented to person, place, and time. Normal strength.      No cranial nerve deficit or sensory deficit. GCS eye subscore is 4. GCS verbal subscore is 5. GCS motor subscore is 6.   Skin:    Skin is intact. No rash noted.   Psychiatric:   Intoxicated but otherwise normal mood and affect.      Emergency Department Course   ECG:  ECG taken at 1936, ECG read at 1939  Normal sinus rhythm   Left axis deviation   Right bundle branch blockage    No change as compared to prior, dated 3/10/2021.  Rate 85 bpm. IA interval 152 ms. QRS duration 140 ms. QT/QTc 444/528 ms. P-R-T axes 61 -40 35.     Imaging:    Chest XR:  Two views of the chest. Elevation left hemidiaphragm is   unchanged. " Lungs are grossly clear with improving infiltrates right   lung base. Old healed bilateral rib fractures are present. Heart is   normal in size. No pneumothorax or pleural effusions.     Laboratory:    CBC: WBC 7.6, HGB 8.4(L),      CMP: Cl 114 (H) CO2 16 (L) glucose 63 (L) BUN 32 (L) GFR 32 (L) Ca 8.0(L) albumin 3.0 (L) alkphos 218 (H) o/w WNL (Creatinine 2.08(H))     Lactic acid (result time 1943) 3.6(H)    Ammonia: 33     Alcohol ethyl: 0.34 (HH)     INR:  1.24 (H)     ABO RH Type and Screen: A, antibody -    Asymptomatic COVID19 Virus PCR by nasopharyngeal swab: negative     Glucose by meter: 121(H)     Procedures:    Emergency Department Course:    Reviewed:  I reviewed nursing notes, vitals, past history and care everywhere    Assessments:  1920 I obtained history and examined the patient as noted above.      2025 I rechecked the patient and explained findings.     2038 I returned to check on patient.      Consults:   2111 I spoke with Dr. Ramírez of the Hospitalist service from [facility] regarding patient's presentation, findings, and plan of care.    Interventions:  2010 Dextrose 50% 25 mL IV     2010 Dilaudid 0.5 mg IV     2116 Dilaudid 0.5 mg IV     Disposition:  The patient was admitted to the hospital under the care of Dr. Ramírez.    Impression & Plan      CMS Diagnoses: The Lactic acid level is elevated due to alcohol abuse and malnourishment, at this time there is no sign of severe sepsis or septic shock.    Medical Decision Making:  This is a 66-year-old male with a complicated medical history, who came in for further evaluation of shortness of breath and abdominal distention, in the setting of known ascites and missing his appointment yesterday for paracentesis.  He does not appear septic or toxic, but he is intoxicated.  I doubt he has SBP, as he does not have a fever or significant abdominal pain.  I felt it was reasonable to check the above blood work and a chest x-ray to rule out  any recurrent pneumonia and to look for any effusions.  I do not feel that he requires emergent paracentesis, however I do feel that it is best that he be brought into the hospital so that can be done sooner rather than later.  He prefers to come into the hospital as well.  Therefore I spoke with Dr. Ramírez, who will be taking care of him.    Covid-19  Jim Richard was evaluated during a global COVID-19 pandemic, which necessitated consideration that the patient might be at risk for infection with the SARS-CoV-2 virus that causes COVID-19.   Applicable protocols for evaluation were followed during the patient's care.   COVID-19 was considered as part of the patient's evaluation. The plan for testing is:  a test was obtained during this visit.    Diagnosis:    ICD-10-CM    1. Alcoholic hepatitis with ascites  K70.11 Asymptomatic SARS-CoV-2 COVID-19 Virus (Coronavirus) by PCR     Glucose by meter     Glucose by meter   2. Shortness of breath  R06.02    3. Alcoholic intoxication without complication (H)  F10.920      Scribe Disclosure:  I, Becky Jones, am serving as a scribe at 7:18 PM on 6/25/2021 to document services personally performed by Edwin Colvin MD based on my observations and the provider's statements to me.      Edwin Colvin MD  06/25/21 7177

## 2021-06-26 NOTE — ED NOTES
Patient refused anything to drink until getting pain medicine. BG is low, explained importance to patient. Will continue to monitor.

## 2021-06-26 NOTE — ED NOTES
DATE:  6/25/2021   TIME OF RECEIPT FROM LAB:  8:17 PM  LAB TEST:  Blood alcohol  LAB VALUE:  0.34  RESULTS GIVEN WITH READ-BACK TO (PROVIDER):  Dr Colvin  TIME LAB VALUE REPORTED TO PROVIDER:   8:18 PM

## 2021-06-26 NOTE — PROGRESS NOTES
Pt A/O, VSS on RA. C/o pain to abd, PRN oxy given. CIWA 8, 1mg PO ativan given. Up in chair, SBA. PIV SL. Clear liquids, NPO at MN. Plan for paracentesis tomorrow.

## 2021-06-26 NOTE — PROVIDER NOTIFICATION
MD Notification    Notified Person: MD    Notified Person Name: Dr. Parekh     Notification Date/Time: 0527 6/26/2021    Notification Interaction: Page     Purpose of Notification: 833-2: R.S. Urinary retention. Scanned for 472 after trying to void. Please advise. Thanks!    -VASQUEZ Hdz    Orders Received:    Comments:

## 2021-06-26 NOTE — PROVIDER NOTIFICATION
"MD Notification    Notified Person: MD    Notified Person Name: Snyder    Notification Date/Time:6/26/2021 at 1452    Notification Interaction:text    Purpose of Notification: Pt has had no urine output this shift.  States he struggles with retention at home and usually only voids a \"small amount\" once a day.  Bladder scanned for 328 ml.  Nocs had to straight cath once but there are no further orders.  Please advise.     Orders Received: Indwelling jiang orders placed.  Add proscar, discontinue flomax.    Comments:    "

## 2021-06-26 NOTE — UTILIZATION REVIEW
[facility] Utilization Review  Admission Status; Secondary Review Determination     Admission Date: 6/25/2021  7:15 PM      Under the authority of the Utilization Management Committee, the utilization review process indicated a secondary review on the above patient.  The review outcome is based on review of the medical records, discussions with staff, and applying clinical experience noted on the date of the review.        (X)      Inpatient Status Appropriate - This patient's medical care is consistent with medical management for inpatient care and reasonable inpatient medical practice.          RATIONALE FOR DETERMINATION   Jim Richard is a 66 year old male with past medical history of portal hypertension, cirrhosis, alcohol abuse, coronary artery disease, who presented to the emergency room with complaints of alcohol intoxication, abdominal distention.  He is registered to observation for management of abdominal ascites, alcohol intoxication (level 0.34), monitoring for alcohol withdrawal.  Since admission, hospital course is complicated by alcohol withdrawal requiring IV and p.o. Ativan with high CIWA scores.  In addition, needs paracentesis.  In light of ongoing alcohol withdrawal requiring aggressive intervention, high risk for adverse outcome due to underlying comorbidities and anticipated length of stay more than 2 midnights, failed observation cares, criteria for inpatient admission is met.  Recommendation is communicated to Dr. Snyder.    The severity of illness, intensity of service provided, expected length of stay and risk for adverse outcome make the care complex, high risk and appropriate for hospital admission.The patient requires hospital based medical care which is anticipated to require a stay of 2 or more midnights;  therefore the patient is appropriately admitted to the hospital as inpatient.         The information on this document is developed by the utilization review team in  order for the business office to ensure compliance.  This only denotes the appropriateness of proper admission status and does not reflect the quality of care rendered.              Sincerely,       Alexia Parker MD, MS  Physician Advisor  Utilization Review-[place]    Phone: [phone]

## 2021-06-26 NOTE — PLAN OF CARE
A&O.  Desats to 85% on RA, 2 liters oxygen applied.  Other VSS.   Paracentesis done, 4.9 liters removed.  Abdomen taut and distended.  Dyspneic on exertion, lungs diminished.  Tolerating low fat/low sodium diet.   +3 LE edema.  Indwelling jiang placed for retention.  Ativan given x3 CIWA scores 4-10.  Rash in groin, miconazole powder applied.  Scattered bruising, skin tears and scabs on skin.  Up with SBA, gait belt and walker.

## 2021-06-26 NOTE — H&P
[facility]    History and Physical - Hospitalist Service       Date of Admission:  6/25/2021    Assessment & Plan    Jim Richard is a 66 year old male chronic alcoholism, cirrhosis and ascites.  He undergoes weekly paracenteses but missed his procedure yesterday because he was out drinking alcohol.  He presented to the emergency room today complaining of abdominal distention.  He was found to be intoxicated.  He is going to be admitted so he can undergo a therapeutic paracentesis tomorrow.    Ascites   Cirrhosis, portal hypertension  As mentioned he missed his weekly paracentesis yesterday because he was out drinking.  He is markedly distended.  According to his medication list, he is not on diuretic therapy at home.    Paracentesis tomorrow    Analgesics tonight as needed    Alcohol dependence and intoxication   Anxiety and depression   He was intoxicated in the emergency room, ethanol level 0.34.  He is on multiple psychiatric medications.    Continue prior to admission Lunesta, Neurontin, Remeron, Seroquel, Desyrel and Trintellix    Monitor for signs of alcohol withdrawal, alcohol withdrawal protocol not yet ordered    Chronic obstructive lung disease     Continue prior to admission inhaled bronchodilators    Coronary artery disease   Chronic hypotension  He is not on aspirin or a beta-blocker.    Continue midodrine     Obstructive sleep apnea     CPAP as at home    Chronic kidney disease  Lactic acidosis  Creatinine is at baseline.  Lactic acid is elevated due to his liver disease not sepsis.    Hypoglycemia due to liver dysfunction  This was treated in the emergency room.    Continue to monitor glucometers     Diet:  advance diet as tolerated   DVT Prophylaxis: none   Leger Catheter: Not present  Central Lines: None  Code Status:  NCB    Disposition Plan   Expected discharge: Tomorrow, recommended to prior living arrangement once paracentesis has been done.     The patient's  care was discussed with the Patient.    Mauricio Ramírez MD  [facility]  Securely message with the [employer] Web Console (learn more here)  Text page via [email or web address] Paging/Directory      ______________________________________________________________________    Chief Complaint   Abdominal distention     History is obtained from the patient, electronic health record and emergency department physician    History of Present Illness   Jim Richard is a 66 year old male chronic alcoholism, cirrhosis and ascites.  He undergoes weekly paracenteses but missed his procedure yesterday because he was out drinking alcohol.  He presented to the emergency room today complaining of abdominal distention.  He was found to be intoxicated.  He is going to be admitted so he can undergo a therapeutic paracentesis tomorrow.  He is feeling little short of breath due to the marked abdominal distention.  It is also very uncomfortable.  He has been constipated.  He has been drinking vodka regularly and his alcohol level was 0.34.  EKG showed sinus rhythm with an old right bundle branch block.  Chest x-ray was negative.  Labs showed hemoglobin 8.4 g lactic acid 3.6 ammonia 33 INR 1.24 creatinine 2.08.  Glucose was low and he was treated with 50% dextrose.  He also received IV Dilaudid for the abdominal pain.    Review of Systems    The 10 point Review of Systems is negative other than noted in the HPI or here.     Past Medical History    I have reviewed this patient's medical history and updated it with pertinent information if needed.   Past Medical History:   Diagnosis Date     Alcoholism (H) 1/14/2013    had treatment at [facility]     Anxiety      COPD (chronic obstructive pulmonary disease) (H)      Coronary artery disease 09/09/2014    Nuclear stress test with slight fixed defect inferiorly, no ischemia     Depression     w/anxiety     Esophageal varices with bleeding 04/28/2018    6 varices banded on  EGD     Heart attack (H)      Hepatomegaly     Fatty liver, chronic alcoholic     Hyperlipemia      Hypertension      JANIS on CPAP      Peripheral vascular disease (H)      PVD (peripheral vascular disease) (H)      SDH (subdural hematoma) (H) 04/26/2018    Right side, resolved spontaneously     Spinal stenosis      Substance abuse (H)        Past Surgical History   I have reviewed this patient's surgical history and updated it with pertinent information if needed.  Past Surgical History:   Procedure Laterality Date     APPENDECTOMY       BYPASS GRAFT FEMOROPOPLITEAL  6/12/2012    Procedure: BYPASS GRAFT FEMOROPOPLITEAL;  LEFT FEMORAL TO ABOVE KNEE POPLITEAL BYPASS, ENDARTERECTOMY OF SFA AND PF ARTERIES, LEFT EXTERNAL ILIAC TO COMMON FEMORAL INTERPOSITION GRAFT;  Surgeon: Damir Roberts MD;  Location:  OR     COLONOSCOPY       COLONOSCOPY N/A 8/8/2019    Procedure: COLONOSCOPY;  Surgeon: Pavan Arguello MD;  Location:  GI     COLONOSCOPY N/A 3/10/2020    Procedure: COLONOSCOPY;  Surgeon: Rosendo Shaw MD;  Location:  GI     ENDARTERECTOMY FEMORAL  6/12/2012    Procedure: ENDARTERECTOMY FEMORAL;;  Surgeon: Damir Roberts MD;  Location:  OR     ESOPHAGOSCOPY, GASTROSCOPY, DUODENOSCOPY (EGD), COMBINED N/A 3/22/2018    Procedure: COMBINED ESOPHAGOSCOPY, GASTROSCOPY, DUODENOSCOPY (EGD);  ESOPHAGOSCOPY, GASTROSCOPY, DUODENOSOCPY.;  Surgeon: Valeri Pruitt MD;  Location:  OR     ESOPHAGOSCOPY, GASTROSCOPY, DUODENOSCOPY (EGD), COMBINED N/A 9/29/2018    Procedure: COMBINED ESOPHAGOSCOPY, GASTROSCOPY, DUODENOSCOPY (EGD);;  Surgeon: Rosendo Shaw MD;  Location:  GI     ESOPHAGOSCOPY, GASTROSCOPY, DUODENOSCOPY (EGD), COMBINED N/A 8/2/2019    Procedure: ESOPHAGOGASTRODUODENOSCOPY (EGD);  Surgeon: Pavan Arguello MD;  Location:  GI     ESOPHAGOSCOPY, GASTROSCOPY, DUODENOSCOPY (EGD), COMBINED N/A 9/25/2019    Procedure: ESOPHAGOGASTRODUODENOSCOPY (EGD);  Surgeon: Pavan Arguello MD;   Location:  GI     ESOPHAGOSCOPY, GASTROSCOPY, DUODENOSCOPY (EGD), COMBINED N/A 3/8/2020    Procedure: ESOPHAGOGASTRODUODENOSCOPY (EGD);  Surgeon: Rosendo Shaw MD;  Location:  GI     ESOPHAGOSCOPY, GASTROSCOPY, DUODENOSCOPY (EGD), COMBINED N/A 6/11/2020    Procedure: ESOPHAGOGASTRODUODENOSCOPY (EGD);  Surgeon: Rosendo Shaw MD;  Location:  GI     ESOPHAGOSCOPY, GASTROSCOPY, DUODENOSCOPY (EGD), COMBINED N/A 1/23/2021    Procedure: ESOPHAGOGASTRODUODENOSCOPY (EGD);  Surgeon: Pavan Arguello MD;  Location:  GI     HERNIA REPAIR  2017    Abdominal     LAMINECTOMY, FUSION LUMBAR THREE+ LEVEL, COMBINED N/A 9/22/2014    Procedure: COMBINED LAMINECTOMY, FUSION LUMBAR THREE+ LEVEL;  Surgeon: Sebastien Pruitt MD;  Location:  OR     TONSILLECTOMY & ADENOIDECTOMY         Social History   I have reviewed this patient's social history and updated it with pertinent information if needed.  Social History     Tobacco Use     Smoking status: Current Every Day Smoker     Packs/day: 1.00     Types: Cigarettes     Smokeless tobacco: Never Used     Tobacco comment: Chantix caused nightmares   Substance Use Topics     Alcohol use: Yes     Comment: 1 pint of vodka a day     Drug use: No       Family History   I have reviewed this patient's family history and updated it with pertinent information if needed.  Family History   Problem Relation Age of Onset     Mental Illness Son      Coronary Artery Disease Early Onset Mother      Substance Abuse Father      Lung Cancer Father      Substance Abuse Brother      Unknown/Adopted No family hx of      Depression No family hx of      Anxiety Disorder No family hx of      Schizophrenia No family hx of      Bipolar Disorder No family hx of      Suicide No family hx of      Dementia No family hx of      [place] Disease No family hx of      Parkinsonism No family hx of      Autism Spectrum Disorder No family hx of      Intellectual Disability (Mental Retardation) No  family hx of        Prior to Admission Medications   Prior to Admission Medications   Prescriptions Last Dose Informant Patient Reported? Taking?   QUEtiapine (SEROQUEL) 200 MG tablet  Self Yes Yes   Sig: Take 200 mg by mouth At Bedtime Filled 1/11/21 #30    QUEtiapine (SEROQUEL) 50 MG tablet prn med Self No Yes   Sig: Take 1 tablet (50 mg) by mouth 3 times daily as needed (anxiety) Filled 11/20/20 #30   albuterol (PROAIR HFA/PROVENTIL HFA/VENTOLIN HFA) 108 (90 Base) MCG/ACT inhaler prn med Self Yes Yes   Sig: Inhale 1-2 puffs into the lungs every 4 hours as needed for shortness of breath / dyspnea or wheezing   eszopiclone (LUNESTA) 3 MG tablet prn med Self Yes Yes   Sig: Take 3 mg by mouth nightly as needed for sleep   fluticasone-vilanterol (BREO ELLIPTA) 200-25 MCG/INH inhaler prn med Self No Yes   Sig: Inhale 1 puff into the lungs daily as needed (SOB)   folic acid (FOLVITE) 1 MG tablet Past Week at Unknown time Self Yes Yes   Sig: Take 1 mg by mouth daily   gabapentin (NEURONTIN) 300 MG capsule Past Week at Unknown time Self Yes Yes   Sig: Take 300 mg by mouth 3 times daily   magnesium oxide (MAG-OX) 400 (240 Mg) MG tablet Past Week at Unknown time Self Yes Yes   Sig: Take 400 mg by mouth daily   midodrine (PROAMATINE) 2.5 MG tablet Past Week at Unknown time Self Yes Yes   Sig: Take 2.5 mg by mouth 3 times daily   mirtazapine (REMERON) 30 MG tablet Past Week at Unknown time Self Yes Yes   Sig: Take 30 mg by mouth At Bedtime Filled 1/11/21 for #30   multivitamin w/minerals (THERA-VIT-M) tablet Past Week at Unknown time Self No Yes   Sig: Take 1 tablet by mouth daily   Patient taking differently: Take 1 tablet by mouth daily Filled 9/21/21 #30   nicotine (NICODERM CQ) 21 MG/24HR 24 hr patch  Self Yes No   Sig: Place 1 patch onto the skin every 24 hours   pantoprazole (PROTONIX) 40 MG EC tablet  Self No Yes   Sig: Take 1 tablet (40 mg) by mouth 2 times daily   tamsulosin (FLOMAX) 0.4 MG capsule Past Week at  Unknown time Self No Yes   Sig: Take 2 capsules (0.8 mg) by mouth daily   traZODone (DESYREL) 100 MG tablet Past Week at Unknown time Self Yes Yes   Sig: Take 100 mg by mouth At Bedtime Filled 1/11/21 #30   vortioxetine (TRINTELLIX) 10 MG tablet Past Month at Unknown time Self Yes Yes   Sig: Take 10 mg by mouth 2 times daily      Facility-Administered Medications: None     Allergies   Allergies   Allergen Reactions     Amlodipine Swelling     Lisinopril      Other reaction(s): Angioedema  Mouth and tongue swelling   Mouth and tongue swelling          Physical Exam   Vital Signs: Temp: 98.4  F (36.9  C) Temp src: Oral BP: (!) 145/78 Pulse: 92   Resp: 20 SpO2: 97 % O2 Device: None (Room air)    Weight: 190 lbs 0 oz    Constitutional: awake, alert, cooperative, no apparent distress  Respiratory: No increased work of breathing, good air exchange, clear to auscultation bilaterally, no crackles or wheezing  Cardiovascular:  regular rate and rhythm, normal S1 and S2, no S3 or S4, and no murmur noted  GI: Abdomen is markedly distended.  He is not tender.  Bowel sounds are difficult to hear.  Skin: no rashes, no jaundice   Musculoskeletal: There is no redness, warmth, or swelling of the joints.  Full range of motion noted.  Motor strength is 5 out of 5 all extremities bilaterally.  Tone is normal.  Neurologic: Awake, alert, oriented to name, place and time.  Cranial nerves II-XII are grossly intact.  Motor is 5 out of 5 bilaterally.  No tremor.  Neuropsychiatric: General: normal, calm and normal eye contact    Data   Data reviewed today: I reviewed all medications, new labs and imaging results over the last 24 hours. I personally reviewed no images or EKG's today.    Recent Labs   Lab 06/25/21  1931   WBC 7.6   HGB 8.4*   MCV 96      INR 1.24*      POTASSIUM 4.1   CHLORIDE 114*   CO2 16*   BUN 32*   CR 2.08*   ANIONGAP 12   KEV 8.0*   GLC 63*   ALBUMIN 3.0*   PROTTOTAL 7.1   BILITOTAL 0.7   ALKPHOS 218*   ALT 22    AST 34     Recent Results (from the past 24 hour(s))   XR Chest 2 Views    Narrative    XR CHEST TWO VIEWS   6/25/2021 7:52 PM     HISTORY: Shortness breath.    COMPARISON: Chest x-ray 6/13/2011.      Impression    IMPRESSION: Two views of the chest. Elevation left hemidiaphragm is  unchanged. Lungs are grossly clear with improving infiltrates right  lung base. Old healed bilateral rib fractures are present. Heart is  normal in size. No pneumothorax or pleural effusions.    ORIN PARSONS MD

## 2021-06-26 NOTE — ED TRIAGE NOTES
Patient presents via EMS r/t SOB d/t missed paracentesis. Patient states typically has these weekly and was scheduled for one 2 days ago. Patient admits to daily drinking, smells of alcohol upon arrival, states last drink was 3 hours ago.

## 2021-06-26 NOTE — PROGRESS NOTES
RECEIVING UNIT ED HANDOFF REVIEW    ED Nurse Handoff Report was reviewed by: Jacqueline Darnell RN on June 25, 2021 at 10:13 PM

## 2021-06-26 NOTE — PLAN OF CARE
A&Ox4. VSS ex O2. Placed on 2 liters overnight while sleeping. Mildly hypertensive. C/o ABD pain; PRN 5 mg Oxycodone given. SOB/NICOLE. ABD distended/taut. BLE +3 edema. CIWA's ordered, needed Ativan overnight. Up SBA. Tried to use urinal @ bedside, but unable to urinate. Bladder scanned for 472. MD updated and got 1x order for straight cath. Got 425 out, re colored. NPO ex meds since 0000. PIV SL. Paracentesis scheduled for today and possible discharge after.

## 2021-06-27 LAB
ALBUMIN SERPL-MCNC: 2.5 G/DL (ref 3.4–5)
ALP SERPL-CCNC: 189 U/L (ref 40–150)
ALT SERPL W P-5'-P-CCNC: 21 U/L (ref 0–70)
ANION GAP SERPL CALCULATED.3IONS-SCNC: 6 MMOL/L (ref 3–14)
AST SERPL W P-5'-P-CCNC: 35 U/L (ref 0–45)
BASOPHILS # BLD AUTO: 0 10E9/L (ref 0–0.2)
BASOPHILS NFR BLD AUTO: 0.3 %
BILIRUB SERPL-MCNC: 1.1 MG/DL (ref 0.2–1.3)
BUN SERPL-MCNC: 32 MG/DL (ref 7–30)
CALCIUM SERPL-MCNC: 8.1 MG/DL (ref 8.5–10.1)
CHLORIDE SERPL-SCNC: 104 MMOL/L (ref 94–109)
CO2 SERPL-SCNC: 21 MMOL/L (ref 20–32)
CREAT SERPL-MCNC: 2.29 MG/DL (ref 0.66–1.25)
DIFFERENTIAL METHOD BLD: ABNORMAL
EOSINOPHIL # BLD AUTO: 0 10E9/L (ref 0–0.7)
EOSINOPHIL NFR BLD AUTO: 0.5 %
ERYTHROCYTE [DISTWIDTH] IN BLOOD BY AUTOMATED COUNT: 18 % (ref 10–15)
GFR SERPL CREATININE-BSD FRML MDRD: 28 ML/MIN/{1.73_M2}
GLUCOSE BLDC GLUCOMTR-MCNC: 111 MG/DL (ref 70–99)
GLUCOSE BLDC GLUCOMTR-MCNC: 119 MG/DL (ref 70–99)
GLUCOSE BLDC GLUCOMTR-MCNC: 135 MG/DL (ref 70–99)
GLUCOSE SERPL-MCNC: 122 MG/DL (ref 70–99)
HCT VFR BLD AUTO: 23.8 % (ref 40–53)
HGB BLD-MCNC: 7.5 G/DL (ref 13.3–17.7)
IMM GRANULOCYTES # BLD: 0 10E9/L (ref 0–0.4)
IMM GRANULOCYTES NFR BLD: 0.5 %
LYMPHOCYTES # BLD AUTO: 0.2 10E9/L (ref 0.8–5.3)
LYMPHOCYTES NFR BLD AUTO: 4.6 %
MCH RBC QN AUTO: 29.6 PG (ref 26.5–33)
MCHC RBC AUTO-ENTMCNC: 31.5 G/DL (ref 31.5–36.5)
MCV RBC AUTO: 94 FL (ref 78–100)
MONOCYTES # BLD AUTO: 0.2 10E9/L (ref 0–1.3)
MONOCYTES NFR BLD AUTO: 5.6 %
NEUTROPHILS # BLD AUTO: 3.5 10E9/L (ref 1.6–8.3)
NEUTROPHILS NFR BLD AUTO: 88.5 %
NRBC # BLD AUTO: 0 10*3/UL
NRBC BLD AUTO-RTO: 0 /100
PLATELET # BLD AUTO: 120 10E9/L (ref 150–450)
POTASSIUM SERPL-SCNC: 4.6 MMOL/L (ref 3.4–5.3)
PROT SERPL-MCNC: 6.1 G/DL (ref 6.8–8.8)
RBC # BLD AUTO: 2.53 10E12/L (ref 4.4–5.9)
SODIUM SERPL-SCNC: 131 MMOL/L (ref 133–144)
WBC # BLD AUTO: 3.9 10E9/L (ref 4–11)

## 2021-06-27 PROCEDURE — 250N000013 HC RX MED GY IP 250 OP 250 PS 637: Performed by: HOSPITALIST

## 2021-06-27 PROCEDURE — 85025 COMPLETE CBC W/AUTO DIFF WBC: CPT | Performed by: INTERNAL MEDICINE

## 2021-06-27 PROCEDURE — 36415 COLL VENOUS BLD VENIPUNCTURE: CPT | Performed by: INTERNAL MEDICINE

## 2021-06-27 PROCEDURE — HZ2ZZZZ DETOXIFICATION SERVICES FOR SUBSTANCE ABUSE TREATMENT: ICD-10-PCS | Performed by: INTERNAL MEDICINE

## 2021-06-27 PROCEDURE — 250N000013 HC RX MED GY IP 250 OP 250 PS 637: Performed by: INTERNAL MEDICINE

## 2021-06-27 PROCEDURE — 999N001017 HC STATISTIC GLUCOSE BY METER IP

## 2021-06-27 PROCEDURE — 80053 COMPREHEN METABOLIC PANEL: CPT | Performed by: INTERNAL MEDICINE

## 2021-06-27 PROCEDURE — 120N000001 HC R&B MED SURG/OB

## 2021-06-27 PROCEDURE — 99233 SBSQ HOSP IP/OBS HIGH 50: CPT | Performed by: INTERNAL MEDICINE

## 2021-06-27 RX ADMIN — GABAPENTIN 300 MG: 300 CAPSULE ORAL at 21:12

## 2021-06-27 RX ADMIN — FOLIC ACID 1 MG: 1 TABLET ORAL at 08:36

## 2021-06-27 RX ADMIN — QUETIAPINE 200 MG: 200 TABLET, FILM COATED ORAL at 21:11

## 2021-06-27 RX ADMIN — GABAPENTIN 300 MG: 300 CAPSULE ORAL at 08:36

## 2021-06-27 RX ADMIN — QUETIAPINE FUMARATE 50 MG: 25 TABLET ORAL at 16:05

## 2021-06-27 RX ADMIN — FINASTERIDE 5 MG: 5 TABLET, FILM COATED ORAL at 08:36

## 2021-06-27 RX ADMIN — THIAMINE HCL TAB 100 MG 200 MG: 100 TAB at 08:36

## 2021-06-27 RX ADMIN — TRAZODONE HYDROCHLORIDE 100 MG: 100 TABLET ORAL at 21:12

## 2021-06-27 RX ADMIN — NICOTINE 1 PATCH: 21 PATCH, EXTENDED RELEASE TRANSDERMAL at 22:06

## 2021-06-27 RX ADMIN — PANTOPRAZOLE SODIUM 40 MG: 40 TABLET, DELAYED RELEASE ORAL at 08:36

## 2021-06-27 RX ADMIN — MAGNESIUM OXIDE TAB 400 MG (241.3 MG ELEMENTAL MG) 400 MG: 400 (241.3 MG) TAB at 08:36

## 2021-06-27 RX ADMIN — VORTIOXETINE 10 MG: 10 TABLET, FILM COATED ORAL at 08:36

## 2021-06-27 RX ADMIN — THIAMINE HCL TAB 100 MG 200 MG: 100 TAB at 15:53

## 2021-06-27 RX ADMIN — MICONAZOLE NITRATE: 20 POWDER TOPICAL at 14:23

## 2021-06-27 RX ADMIN — VORTIOXETINE 10 MG: 10 TABLET, FILM COATED ORAL at 21:12

## 2021-06-27 RX ADMIN — THIAMINE HCL TAB 100 MG 100 MG: 100 TAB at 21:12

## 2021-06-27 RX ADMIN — MICONAZOLE NITRATE: 20 POWDER TOPICAL at 21:12

## 2021-06-27 RX ADMIN — TAMSULOSIN HYDROCHLORIDE 0.8 MG: 0.4 CAPSULE ORAL at 08:36

## 2021-06-27 RX ADMIN — MULTIPLE VITAMINS W/ MINERALS TAB 1 TABLET: TAB at 08:36

## 2021-06-27 RX ADMIN — LORAZEPAM 1 MG: 1 TABLET ORAL at 01:59

## 2021-06-27 RX ADMIN — PANTOPRAZOLE SODIUM 40 MG: 40 TABLET, DELAYED RELEASE ORAL at 21:11

## 2021-06-27 RX ADMIN — GABAPENTIN 300 MG: 300 CAPSULE ORAL at 15:53

## 2021-06-27 RX ADMIN — LORAZEPAM 1 MG: 1 TABLET ORAL at 08:51

## 2021-06-27 RX ADMIN — OXYCODONE HYDROCHLORIDE 5 MG: 5 TABLET ORAL at 15:53

## 2021-06-27 RX ADMIN — MIRTAZAPINE 30 MG: 15 TABLET, FILM COATED ORAL at 21:12

## 2021-06-27 ASSESSMENT — ACTIVITIES OF DAILY LIVING (ADL)
ADLS_ACUITY_SCORE: 13
ADLS_ACUITY_SCORE: 11
ADLS_ACUITY_SCORE: 13
ADLS_ACUITY_SCORE: 13

## 2021-06-27 NOTE — PLAN OF CARE
"A&O, but was confused after nighttime meds given and dreaming a bit. VSS, had to place on 1 liter overnight. Band-aide in place to LLQ. Abdomen taut and distended. Not as firm as previous night. Patient states breathing is better after paracentesis 6/26. Mild NICOLE. LS diminished. Walked in halls last evening, up SBA/GB/W. Feels weak/unsteady/lightheaded when walking to bathroom overnight. VSS, LL=389. Patient sad and stating he wants to stop drinking and knows he needs to change. Restless overnight, up in chair. Did not fall asleep until after 0300. +3 BLE edema. CIWA's on shift, highest @ a 9. Ativan given as ordered x3 overnight. Leger in place, good re colored UOP overnight. Rash in groin, miconazole powder applied. Discharge pending.     Addendum: CIWA score this AM @ a \"10\". Held Ativan dose at that time d/t sedation and dysarthria. Patient lethargic this AM.   "

## 2021-06-27 NOTE — PLAN OF CARE
A&O, slightly lethargic at times. VSS, weaned to room air.  CIWA score 8-5-4, ativan given x1.  Chem dep consulted.  Assist of 1-2, gait belt and walker.  PT/OT consult.  Tolerating low fat/low NA diet, pt placed on 1500 mL fluid restriction.  Miconazole powder applied to groin rash after shower.  +3 LE edema,  pt slightly NICOLE, lungs diminished.  LLQ bandaid over paracentesis site intact.  Leger paten with good output. Discharge/dispo pending.

## 2021-06-27 NOTE — PROGRESS NOTES
[facility]    Hospitalist Progress Note    Brief Summary:   Jim Richard is a 66 year old male chronic alcoholism, cirrhosis and ascites.  He undergoes weekly paracenteses but missed his procedure yesterday because he was out drinking alcohol.  He presented to the emergency room today complaining of abdominal distention.  He was found to be intoxicated.  He is going to be admitted so he can undergo a therapeutic paracentesis.    Assessment & Plan      Massive Ascites recurrent   Cirrhosis, portal hypertension  Patient missed his weekly paracentesis and presented with makedly distended abdomen, difficulty breathing and discomfort because of that. He is now S/p Therapeutic Paracentesis with about 5.9 liter out. Feeling better now.   He is not on diuretics as diuresis worsen his kidney functions.      Alcohol dependence and intoxication on admission.  Now Alcohol withdrawal.   Anxiety and depression   He was intoxicated in the emergency room, ethanol level 0.34.  He is on multiple psychiatric medications.  He is now having withdrawal symptoms, CIWA 8 this morning with mainly tremors and sub disorientation this morning. , keep him on alcohol withdrawal, avoid IV fluids at this time.     Continue prior to admission Lunesta, Neurontin, Remeron, Seroquel, Desyrel and Trintellix    Keep him on Thiamine, folic acid and multivitamin.     Check CIWA and give him lorazepam according to CIWA   Will consult chemical dependency to evaluate him, he will benefit from inpatient treatment.      Chronic obstructive lung disease     Continue prior to admission inhaled bronchodilators    Not in acute exacerbation at this time.      Coronary artery disease   Chronic hypotension  He is not on aspirin or a beta-blocker.    Continue midodrine     Obstructive sleep apnea     CPAP as at home     Chronic kidney disease  Lactic acidosis  Creatinine is at baseline (2.0-2.2).  Lactic acid is elevated due to his  liver disease not sepsis.  Creatinine slightly up to 2.29 post Paracentesis.      Hypoglycemia due to liver dysfunction  This was treated in the emergency room.    Continue to monitor glucometers    No more hypoglycemia at this time.     Pancytopenia   Thrombocytopenia, leukopenia and anemia, this is likely secondary to alcohol abuse  Keep monitoring.     Hyponatremia   start him on free water restriction of 1500 ml in 24 hrs.     Consult PT, OT , SW and CD to evaluate him.        DVT Prophylaxis: Pneumatic Compression Devices  Code Status: No CPR- Do NOT Intubate    Disposition: Expected discharge in 1-2 days if remain stable and paracentesis done today     Dhaval Snyder MD  Text Page  (7am - 6pm)    Interval History   Patient told me he was some what disoriented when woke up early, he start having tremors and some withdrawal symptoms as well, get dizzy when get up and feeling weak and tired. His breathing is now better after the paracentesis, abdomen feel less distended.     Denies any fever, chills, nausea, vomiting, headache or abdominal pain at thsi time.     No other significant event overnight.     -Data reviewed today: I reviewed all new labs and imaging results over the last 24 hours. I personally reviewed no images or EKG's today.    Physical Exam   Temp: 97.5  F (36.4  C) Temp src: Axillary BP: (!) 148/66 Pulse: 79   Resp: 14 SpO2: 99 % O2 Device: Nasal cannula Oxygen Delivery: 1 LPM  Vitals:    06/25/21 1920 06/25/21 2330   Weight: 86.2 kg (190 lb) 80.5 kg (177 lb 6.4 oz)     Vital Signs with Ranges  Temp:  [96.5  F (35.8  C)-98.9  F (37.2  C)] 97.5  F (36.4  C)  Pulse:  [79-90] 79  Resp:  [14-20] 14  BP: (126-150)/(60-70) 148/66  SpO2:  [87 %-99 %] 99 %  I/O last 3 completed shifts:  In: 838 [P.O.:838]  Out: 600 [Urine:600]    Constitutional: awake, alert, cooperative, no apparent distress, and appears stated age  Eyes: Lids and lashes normal, pupils equal, round and reactive to light, extra ocular  muscles intact, sclera clear, conjunctiva normal  Respiratory: No increased work of breathing, decrease air entry bilaterally, no crackles or wheezing  Cardiovascular: Normal apical impulse, regular rate and rhythm, normal S1 and S2, no S3 or S4, and no murmur noted  GI: No scars, normal bowel sounds, soft, less distended today, non-tender, no masses palpated, no hepatosplenomegally  Musculoskeletal: 1+ lower extremity pitting edema present  Neurologic: no focal deficit.     Medications       finasteride  5 mg Oral Daily     folic acid  1 mg Oral Daily     gabapentin  300 mg Oral TID     magnesium oxide  400 mg Oral Daily     miconazole   Topical BID     [Held by provider] midodrine  2.5 mg Oral TID     mirtazapine  30 mg Oral At Bedtime     multivitamin w/minerals  1 tablet Oral Daily     nicotine  1 patch Transdermal Q24H     nicotine   Transdermal Q8H     pantoprazole  40 mg Oral BID     QUEtiapine  200 mg Oral At Bedtime     sodium chloride (PF)  3 mL Intracatheter Q8H     tamsulosin  0.8 mg Oral Daily     thiamine  200 mg Oral TID    Followed by     thiamine  100 mg Oral TID    Followed by     [START ON 7/3/2021] thiamine  100 mg Oral Daily     traZODone  100 mg Oral At Bedtime     vortioxetine  10 mg Oral BID       Data   Recent Labs   Lab 06/27/21  0803 06/25/21  1931   WBC 3.9* 7.6   HGB 7.5* 8.4*   MCV 94 96   * 231   INR  --  1.24*   * 142   POTASSIUM 4.6 4.1   CHLORIDE 104 114*   CO2 21 16*   BUN 32* 32*   CR 2.29* 2.08*   ANIONGAP 6 12   KEV 8.1* 8.0*   * 63*   ALBUMIN 2.5* 3.0*   PROTTOTAL 6.1* 7.1   BILITOTAL 1.1 0.7   ALKPHOS 189* 218*   ALT 21 22   AST 35 34       Recent Results (from the past 24 hour(s))   US Paracentesis    [facility] RADIOLOGY  DATE: 6/26/2021    PROCEDURE: IMAGING GUIDED PARACENTESIS    INTERVENTIONAL RADIOLOGIST: Kolby Altman MD.    INDICATION: 66-year-old male with recurrent symptomatic ascites.    CONSENT: The risks, benefits and alternatives of  an imaging guided  paracentesis were discussed with the patient  in detail. All questions  were answered. Informed consent was given to proceed with the  procedure.    MODERATE SEDATION: None.    COMPLICATIONS: No immediate complications.    PROCEDURE:    A limited ultrasound was performed for localization purposes.    Using sterile technique 10 mL of Xylocaine was infused into the local  soft tissues. Under direct ultrasound guidance an 8F catheter was  inserted into the ascitic fluid.    A total of 5900 mL of clear yellow ascitic fluid was removed and sent  to the lab if diagnostic analysis was requested.    FINDINGS:  The initial ultrasound shows a large amount of peritoneal ascites.    Images obtained during catheter placement show the access needle with  tip in the peritoneal ascites.      Impression    IMPRESSION:    Successful ultrasound-guided paracentesis, as discussed above.    ____________________________________________________________________    CPT codes for physician reference only:  32514      ASHLEY PERALES MD

## 2021-06-28 ENCOUNTER — APPOINTMENT (OUTPATIENT)
Dept: OCCUPATIONAL THERAPY | Facility: CLINIC | Age: 66
DRG: 432 | End: 2021-06-28
Attending: INTERNAL MEDICINE
Payer: MEDICARE

## 2021-06-28 LAB
ALBUMIN SERPL-MCNC: 2.4 G/DL (ref 3.4–5)
ANION GAP SERPL CALCULATED.3IONS-SCNC: 7 MMOL/L (ref 3–14)
BASOPHILS # BLD AUTO: 0 10E9/L (ref 0–0.2)
BASOPHILS NFR BLD AUTO: 0.6 %
BUN SERPL-MCNC: 32 MG/DL (ref 7–30)
CALCIUM SERPL-MCNC: 8.1 MG/DL (ref 8.5–10.1)
CHLORIDE SERPL-SCNC: 102 MMOL/L (ref 94–109)
CO2 SERPL-SCNC: 22 MMOL/L (ref 20–32)
CREAT SERPL-MCNC: 2.46 MG/DL (ref 0.66–1.25)
DIFFERENTIAL METHOD BLD: ABNORMAL
EOSINOPHIL # BLD AUTO: 0.1 10E9/L (ref 0–0.7)
EOSINOPHIL NFR BLD AUTO: 2.2 %
ERYTHROCYTE [DISTWIDTH] IN BLOOD BY AUTOMATED COUNT: 18.7 % (ref 10–15)
GFR SERPL CREATININE-BSD FRML MDRD: 26 ML/MIN/{1.73_M2}
GLUCOSE BLDC GLUCOMTR-MCNC: 154 MG/DL (ref 70–99)
GLUCOSE SERPL-MCNC: 98 MG/DL (ref 70–99)
HCT VFR BLD AUTO: 24.1 % (ref 40–53)
HGB BLD-MCNC: 7.5 G/DL (ref 13.3–17.7)
IMM GRANULOCYTES # BLD: 0 10E9/L (ref 0–0.4)
IMM GRANULOCYTES NFR BLD: 0.3 %
LYMPHOCYTES # BLD AUTO: 0.4 10E9/L (ref 0.8–5.3)
LYMPHOCYTES NFR BLD AUTO: 10.1 %
MAGNESIUM SERPL-MCNC: 1.9 MG/DL (ref 1.6–2.3)
MCH RBC QN AUTO: 29.9 PG (ref 26.5–33)
MCHC RBC AUTO-ENTMCNC: 31.1 G/DL (ref 31.5–36.5)
MCV RBC AUTO: 96 FL (ref 78–100)
MONOCYTES # BLD AUTO: 0.3 10E9/L (ref 0–1.3)
MONOCYTES NFR BLD AUTO: 7.9 %
NEUTROPHILS # BLD AUTO: 2.8 10E9/L (ref 1.6–8.3)
NEUTROPHILS NFR BLD AUTO: 78.9 %
NRBC # BLD AUTO: 0 10*3/UL
NRBC BLD AUTO-RTO: 0 /100
PHOSPHATE SERPL-MCNC: 2.8 MG/DL (ref 2.5–4.5)
PLATELET # BLD AUTO: 125 10E9/L (ref 150–450)
POTASSIUM SERPL-SCNC: 4.4 MMOL/L (ref 3.4–5.3)
RBC # BLD AUTO: 2.51 10E12/L (ref 4.4–5.9)
SODIUM SERPL-SCNC: 131 MMOL/L (ref 133–144)
WBC # BLD AUTO: 3.6 10E9/L (ref 4–11)

## 2021-06-28 PROCEDURE — 97535 SELF CARE MNGMENT TRAINING: CPT | Mod: GO

## 2021-06-28 PROCEDURE — 97165 OT EVAL LOW COMPLEX 30 MIN: CPT | Mod: GO

## 2021-06-28 PROCEDURE — 250N000009 HC RX 250: Performed by: INTERNAL MEDICINE

## 2021-06-28 PROCEDURE — 250N000013 HC RX MED GY IP 250 OP 250 PS 637: Performed by: INTERNAL MEDICINE

## 2021-06-28 PROCEDURE — 80069 RENAL FUNCTION PANEL: CPT | Performed by: INTERNAL MEDICINE

## 2021-06-28 PROCEDURE — 83735 ASSAY OF MAGNESIUM: CPT | Performed by: INTERNAL MEDICINE

## 2021-06-28 PROCEDURE — 97530 THERAPEUTIC ACTIVITIES: CPT | Mod: GO

## 2021-06-28 PROCEDURE — 36415 COLL VENOUS BLD VENIPUNCTURE: CPT | Performed by: INTERNAL MEDICINE

## 2021-06-28 PROCEDURE — 250N000013 HC RX MED GY IP 250 OP 250 PS 637: Performed by: HOSPITALIST

## 2021-06-28 PROCEDURE — 85025 COMPLETE CBC W/AUTO DIFF WBC: CPT | Performed by: INTERNAL MEDICINE

## 2021-06-28 PROCEDURE — 120N000001 HC R&B MED SURG/OB

## 2021-06-28 PROCEDURE — 999N001017 HC STATISTIC GLUCOSE BY METER IP

## 2021-06-28 PROCEDURE — 99233 SBSQ HOSP IP/OBS HIGH 50: CPT | Performed by: INTERNAL MEDICINE

## 2021-06-28 RX ORDER — LIDOCAINE HYDROCHLORIDE 20 MG/ML
JELLY TOPICAL EVERY 4 HOURS PRN
Status: DISCONTINUED | OUTPATIENT
Start: 2021-06-28 | End: 2021-07-12

## 2021-06-28 RX ADMIN — MICONAZOLE NITRATE: 20 POWDER TOPICAL at 09:21

## 2021-06-28 RX ADMIN — PANTOPRAZOLE SODIUM 40 MG: 40 TABLET, DELAYED RELEASE ORAL at 20:17

## 2021-06-28 RX ADMIN — QUETIAPINE FUMARATE 50 MG: 25 TABLET ORAL at 13:50

## 2021-06-28 RX ADMIN — MAGNESIUM OXIDE TAB 400 MG (241.3 MG ELEMENTAL MG) 400 MG: 400 (241.3 MG) TAB at 09:06

## 2021-06-28 RX ADMIN — FOLIC ACID 1 MG: 1 TABLET ORAL at 09:05

## 2021-06-28 RX ADMIN — LORAZEPAM 1 MG: 1 TABLET ORAL at 01:33

## 2021-06-28 RX ADMIN — MICONAZOLE NITRATE: 20 POWDER TOPICAL at 20:17

## 2021-06-28 RX ADMIN — FINASTERIDE 5 MG: 5 TABLET, FILM COATED ORAL at 09:07

## 2021-06-28 RX ADMIN — OXYCODONE HYDROCHLORIDE 5 MG: 5 TABLET ORAL at 15:59

## 2021-06-28 RX ADMIN — OXYCODONE HYDROCHLORIDE 5 MG: 5 TABLET ORAL at 09:21

## 2021-06-28 RX ADMIN — TAMSULOSIN HYDROCHLORIDE 0.8 MG: 0.4 CAPSULE ORAL at 09:06

## 2021-06-28 RX ADMIN — VORTIOXETINE 10 MG: 10 TABLET, FILM COATED ORAL at 09:07

## 2021-06-28 RX ADMIN — THIAMINE HCL TAB 100 MG 100 MG: 100 TAB at 21:57

## 2021-06-28 RX ADMIN — MULTIPLE VITAMINS W/ MINERALS TAB 1 TABLET: TAB at 09:07

## 2021-06-28 RX ADMIN — PANTOPRAZOLE SODIUM 40 MG: 40 TABLET, DELAYED RELEASE ORAL at 09:05

## 2021-06-28 RX ADMIN — GABAPENTIN 300 MG: 300 CAPSULE ORAL at 15:52

## 2021-06-28 RX ADMIN — THIAMINE HCL TAB 100 MG 100 MG: 100 TAB at 15:52

## 2021-06-28 RX ADMIN — CARBIDOPA AND LEVODOPA 2.5 MG: 50; 200 TABLET, EXTENDED RELEASE ORAL at 21:57

## 2021-06-28 RX ADMIN — VORTIOXETINE 10 MG: 10 TABLET, FILM COATED ORAL at 20:17

## 2021-06-28 RX ADMIN — NICOTINE 1 PATCH: 21 PATCH, EXTENDED RELEASE TRANSDERMAL at 21:59

## 2021-06-28 RX ADMIN — OXYCODONE HYDROCHLORIDE 5 MG: 5 TABLET ORAL at 20:17

## 2021-06-28 RX ADMIN — CARBIDOPA AND LEVODOPA 2.5 MG: 50; 200 TABLET, EXTENDED RELEASE ORAL at 15:59

## 2021-06-28 RX ADMIN — OXYCODONE HYDROCHLORIDE 5 MG: 5 TABLET ORAL at 05:53

## 2021-06-28 RX ADMIN — MIRTAZAPINE 30 MG: 15 TABLET, FILM COATED ORAL at 21:57

## 2021-06-28 RX ADMIN — THIAMINE HCL TAB 100 MG 100 MG: 100 TAB at 09:07

## 2021-06-28 RX ADMIN — QUETIAPINE 200 MG: 200 TABLET, FILM COATED ORAL at 21:58

## 2021-06-28 RX ADMIN — GABAPENTIN 300 MG: 300 CAPSULE ORAL at 21:58

## 2021-06-28 RX ADMIN — GABAPENTIN 300 MG: 300 CAPSULE ORAL at 09:06

## 2021-06-28 RX ADMIN — LIDOCAINE HYDROCHLORIDE: 20 JELLY TOPICAL at 06:04

## 2021-06-28 RX ADMIN — TRAZODONE HYDROCHLORIDE 100 MG: 100 TABLET ORAL at 21:58

## 2021-06-28 ASSESSMENT — ACTIVITIES OF DAILY LIVING (ADL)
PREVIOUS_RESPONSIBILITIES: MEAL PREP;HOUSEKEEPING;LAUNDRY;SHOPPING;MEDICATION MANAGEMENT;FINANCES;DRIVING
ADLS_ACUITY_SCORE: 13

## 2021-06-28 NOTE — PLAN OF CARE
Pt A/O, forgetful at times. VSS on RA. C/o pain to umbilical and L abd, PRN oxy given. CIWA 4. Pt up A1 GB/W, chair for meals. Tolerating low fat/Na diet w/ 1500ml FR. Plan for paracentesis tomorrow. Discharge to TCU when placement found.

## 2021-06-28 NOTE — PROGRESS NOTES
"   06/28/21 0735   Quick Adds   Type of Visit Initial Occupational Therapy Evaluation   Living Environment   People in home alone   [place]   Home Accessibility stairs within home   Number of Stairs, Main Entrance   (uses garage and reports no stairs into residence)   Number of Stairs, Within Home, Primary other (see comments)  (split level, 6up/6down - 12 from entry at lower level to sukhwinder)   Stair Railings, Within Home, Primary railings safe and in good condition   Transportation Anticipated car, drives self   Living Environment Comments States he drove yesterday but was admitted 3 days ago with intoxicationorter and was admitted    Self-Care   Usual Activity Tolerance fair   Current Activity Tolerance fair   Regular Exercise No   Equipment Currently Used at Home walker, standard;grab bar, tub/shower   Activity/Exercise/Self-Care Comment Pt may not be reliable  however he states that he is indep all self-cares, HH tasks, driving/shopping and uses a FWW \"sometimes\" in home but that home is very small so not always needed. He takes it on outings if outing longer type.    Disability/Function   Hearing Difficulty or Deaf no   Wear Glasses or Blind no   Concentrating, Remembering or Making Decisions Difficulty no   Difficulty Communicating no   Difficulty Eating/Swallowing no   Walking or Climbing Stairs Difficulty no   Walking or Climbing Stairs ambulation difficulty, requires equipment;other (see comments)   Dressing/Bathing Difficulty no   Toileting issues no   Doing Errands Independently Difficulty (such as shopping) no   Fall history within last six months yes   Number of times patient has fallen within last six months   (\"a few times\")   Change in Functional Status Since Onset of Current Illness/Injury   (pt provided above report and not reliable )   General Information   Onset of Illness/Injury or Date of Surgery 06/25/21   Referring Physician Dr. Snyder " "  Patient/Family Therapy Goal Statement (OT) \"go home as soon as possible\"   Additional Occupational Profile Info/Pertinent History of Current Problem 66 year old male chronic alcoholism, cirrhosis and ascites.  He undergoes weekly paracenteses but missed his procedure on  because he was out drinking alcohol.  He presented to the emergency room complaining of abdominal distention.  He was found to be intoxicated (WATTERS .34).  Admitted and underwent therapeutic paracentesis. See H&P for significant PMH.   Existing Precautions/Restrictions fall   General Observations and Info Pt up in chair eating breakfast upon arrival   Cognitive Status Examination   Orientation Status person   Affect/Mental Status (Cognitive) confused   Follows Commands follows one-step commands;over 90% accuracy   Safety Deficit insight into deficits/self-awareness;safety precautions awareness;awareness of need for assistance;at risk behavior observed   Cognitive Status Comments Pt O to self, , not place but stated correctly later in session, not oriented to JANELL or date or POTUS but is oriented to month and year.    Visual Perception   Visual Impairment/Limitations corrective lenses for reading;WNL   Sensory   Sensory Quick Adds No deficits were identified   Pain Assessment   Patient Currently in Pain No   Range of Motion Comprehensive   Comment, General Range of Motion B carolynn flex approx 160o   Upper Extremity (Manual Muscle Testing)   Comment, MMT: Upper Extremity B carolynn flex 4/5   Coordination   Coordination Comments Patient currently has mild B hand tremoring from withdrawal likely, having difficulty opening small container of ice cream on tray, spilled food on self and floor   Bed Mobility   Comment (Bed Mobility) Not assessed as up in chair   Transfers   Transfers toilet transfer   Sit-Stand Transfer   Sit-Stand [place] (Transfers) verbal cues;contact guard   Assistive Device (Sit-Stand Transfers) walker, front-wheeled   Toilet " Transfer   Type (Toilet Transfer) sit-stand;stand-sit   [place] Level (Toilet Transfer) verbal cues;minimum assist (75% patient effort)   Assistive Device (Toilet Transfer) walker, front-wheeled;grab bars/safety frame   Balance   Balance Comments Pt unsteady on feet, constant verbal and tactile cueing/walker guidance for safe maneuvering in room/bathroom with FWW, Min-Mod A for balance   Lower Body Dressing Assessment/Training   [place] Level (Lower Body Dressing) minimum assist (75% patient effort)   Comment (Lower Body Dressing) A with balance when pulling up clothing in standing   Instrumental Activities of Daily Living (IADL)   Previous Responsibilities meal prep;housekeeping;laundry;shopping;medication management;finances;driving   IADL Comments Pt reports he has no one available to assist him but that his neighbor checks on him   Clinical Impression   Criteria for Skilled Therapeutic Interventions Met (OT) yes   OT Diagnosis impaired ADL/IADL and mobility   OT Problem List-Impairments impacting ADL problems related to;activity tolerance impaired;balance;cognition;coordination;mobility;strength;pain;other (see comments)  (Alcoholism impairs judgment, balance affecting all ADL, IADL)   Assessment of Occupational Performance 5 or more Performance Deficits   Identified Performance Deficits dressing, grooming, toileting, bathing, household mgmt, community mobility, functional mobility   Planned Therapy Interventions (OT) ADL retraining;IADL retraining;cognition;strengthening;transfer training;home program guidelines   Clinical Decision Making Complexity (OT) low complexity   Therapy Frequency (OT) 5x/week   Predicted Duration of Therapy 4 days   Risk & Benefits of therapy have been explained evaluation/treatment results reviewed;care plan/treatment goals reviewed   OT Discharge Planning    OT Discharge Recommendation (DC Rec) Transitional Care Facility  (Would benefit from inpatient CD treatment )   OT  Rationale for DC Rec Pt currently presents with impaired balance, impaired safety/judgement and cognitive skills affecting ability to manage safely in home setting alone. Will benefit from continued therapies in TCU setting to advance safety and indep with ADL/IADL and mobilities and to help determine need for change of living situation.    OT Brief overview of current status  OT initiated. Pt had been assessed very recently during hospitalization here through 21. Today he is oriented to self,  not place (although did give correct response later in session when asked again), month and year. Not oriented to JANELL, date or POTUS. Responded to 2/4 home safety questions correctly. Required Min-Mod A with amb in room using FWW as very unsteady, lacks safety awareness and performs turns and functional transfers unsafe manner. Nearly fell returning to chair requiring Mod A of therapist to prevent fall. Requiring Min A with LB dressing and toileting. He has mild B hand tremors affecting ability to open food containers and performing self-feeding without spilling.    Total Evaluation Time (Minutes)   Total Evaluation Time (Minutes) 15

## 2021-06-28 NOTE — PLAN OF CARE
A&Ox4. Forgetful @ times. VSS, on RA. Did not need O2 overnight. Continious pulse ox in place. C/o pain to left hip area; PRN Oxycodone given. Band-aide in place to left side of ABD. CIWA's 9,3. PRN Ativan given. Leger w/ adequate UOP. ABD distended, soft. BLE +3. Mild NICOLE. Last BM 6/27. Up A1 Gb/W to BR. Tolerating low fat/Na diet, 1500ml FR. PT/OT, chem dep to see. Discharge pending disposition plan.

## 2021-06-28 NOTE — PLAN OF CARE
PT: Eval orders received, chart reviewed. Pt seen by OT this AM. Pt very unsteady, ETOH withdrawal. Will hold PT to allow for further medical management to improve pt's ability to fully participate in evaluation

## 2021-06-28 NOTE — PLAN OF CARE
A/O, forgetful at times. VSS on RA, continuous pulse ox in place. C/o soreness to paracentesis site to L abd, PRN oxy given, hot packs applied in addition. CIWAs 4, 4. PRN seroquel given for anxiety. Leger w/ adequate UOP. Pt had BM this evening. Up A1 Gb/W to BR. Up in chair for most of evening. Tolerating low fat/Na diet, 1500ml FR. PT/OT, chem dep to see. Discharge pending disposition plan.

## 2021-06-28 NOTE — PROGRESS NOTES
A&Ox3, disoriented to time. Pt forgetful at times.  VSS on RA. C/o of pain to left hip area; PRN oxycodone given. CIWA's 6, 3. Pt up to chair for meals, pt moves w/A1, GB and Walker. Pt ambulated x1. Tolerating low fat/Na diet, 1500mL FR. Pt c/o anxiety, gave PRN Seroquel.

## 2021-06-28 NOTE — PROGRESS NOTES
[facility]    Hospitalist Progress Note    Brief Summary:   Jim Richard is a 66 year old male chronic alcoholism, cirrhosis and ascites.  He undergoes weekly paracenteses but missed his procedure yesterday because he was out drinking alcohol.  He presented to the emergency room today complaining of abdominal distention.  He was found to be intoxicated.  He is going to be admitted so he can undergo a therapeutic paracentesis.    Assessment & Plan      Massive Ascites recurrent   Cirrhosis, portal hypertension  Patient missed his weekly paracentesis and presented with makedly distended abdomen, difficulty breathing and discomfort because of that. He is now S/p Therapeutic Paracentesis with about 5.9 liter out. Feeling better now.   He is not on diuretics as diuresis worsen his kidney functions. He is on weekly paracentesis. I am planning to repeat paracentesis on 6/29     Alcohol dependence and intoxication on admission.  Now Alcohol withdrawal.   Anxiety and depression   He was intoxicated in the emergency room, ethanol level 0.34.  He is on multiple psychiatric medications.  He is now having withdrawal symptoms, CIWA 8 this morning with mainly tremors and sub disorientation this morning. , keep him on alcohol withdrawal, avoid IV fluids at this time.     Continue prior to admission Lunesta, Neurontin, Remeron, Seroquel, Desyrel and Trintellix    Keep him on Thiamine, folic acid and multivitamin.     Check CIWA and give him lorazepam according to CIWA   CD consulted, evaluation pending, he will benefit from inpatient treatment.      Chronic obstructive lung disease     Continue prior to admission inhaled bronchodilators    Not in acute exacerbation at this time.      Coronary artery disease   Chronic hypotension  He is not on aspirin or a beta-blocker.    Continue midodrine     Obstructive sleep apnea     CPAP as at home     Chronic kidney disease  Lactic acidosis  Creatinine is at  baseline (2.0-2.2).  Lactic acid is elevated due to his liver disease not sepsis.  Creatinine slightly up again, continue to monitor, he is not on any diuretics, I will resume his  Midodrine now.      Hypoglycemia due to liver dysfunction  This was treated in the emergency room.    Continue to monitor glucometers    No more hypoglycemia at this time.     Pancytopenia   Thrombocytopenia, leukopenia and anemia, this is likely secondary to alcohol abuse  Keep monitoring.     Hyponatremia   start him on free water restriction of 1500 ml in 24 hrs.   Sodium remain stable    Deconditioning   This is multifactorial related to alcohol abuse, cirrhosis, recurrent ascites   PT and OT recommending TCU, I agree with that.  on board.       Alcohol withdrawal improving.   Continue PT and OT  CD evaluation pending   SW for disposition  Repeat Paracentesis tomorrow.        DVT Prophylaxis: Pneumatic Compression Devices  Code Status: No CPR- Do NOT Intubate    Disposition: Expected discharge in 1-2 days if remain stable and paracentesis done today     Dhaval Snyder MD  Text Page  (7am - 6pm)    Interval History   Feeling some what better this morning but still weak and tired. No significant tremors today, denies any fever, chills, chest pain, nausea or vomiting at this time.       No other significant event overnight.     -Data reviewed today: I reviewed all new labs and imaging results over the last 24 hours. I personally reviewed no images or EKG's today.    Physical Exam   Temp: 96.5  F (35.8  C) Temp src: Oral BP: 123/71 Pulse: 78   Resp: 16 SpO2: 96 % O2 Device: None (Room air)    Vitals:    06/25/21 1920 06/25/21 2330   Weight: 86.2 kg (190 lb) 80.5 kg (177 lb 6.4 oz)     Vital Signs with Ranges  Temp:  [95.4  F (35.2  C)-99.2  F (37.3  C)] 96.5  F (35.8  C)  Pulse:  [74-90] 78  Resp:  [14-18] 16  BP: (118-143)/(59-71) 123/71  SpO2:  [95 %-98 %] 96 %  I/O last 3 completed shifts:  In: 1440 [P.O.:1440]  Out: 900  [Urine:900]    Constitutional: awake, alert, cooperative, no apparent distress, and appears stated age  Eyes: Lids and lashes normal, pupils equal, round and reactive to light, extra ocular muscles intact, sclera clear, conjunctiva normal  Respiratory: No increased work of breathing, decrease air entry bilaterally, no crackles or wheezing  Cardiovascular: Normal apical impulse, regular rate and rhythm, normal S1 and S2, no S3 or S4, and no murmur noted  GI: No scars, normal bowel sounds, soft,  Distended because of ascites, umbilical hernia present, non-tender, no masses palpated, no hepatosplenomegally  Musculoskeletal: 1+ lower extremity pitting edema present  Neurologic: no focal deficit.     Medications       finasteride  5 mg Oral Daily     folic acid  1 mg Oral Daily     gabapentin  300 mg Oral TID     magnesium oxide  400 mg Oral Daily     miconazole   Topical BID     [Held by provider] midodrine  2.5 mg Oral TID     mirtazapine  30 mg Oral At Bedtime     multivitamin w/minerals  1 tablet Oral Daily     nicotine  1 patch Transdermal Q24H     nicotine   Transdermal Q8H     pantoprazole  40 mg Oral BID     QUEtiapine  200 mg Oral At Bedtime     sodium chloride (PF)  3 mL Intracatheter Q8H     tamsulosin  0.8 mg Oral Daily     thiamine  100 mg Oral TID    Followed by     [START ON 7/3/2021] thiamine  100 mg Oral Daily     traZODone  100 mg Oral At Bedtime     vortioxetine  10 mg Oral BID       Data   Recent Labs   Lab 06/28/21  0754 06/27/21  0803 06/25/21  1931   WBC 3.6* 3.9* 7.6   HGB 7.5* 7.5* 8.4*   MCV 96 94 96   * 120* 231   INR  --   --  1.24*   * 131* 142   POTASSIUM 4.4 4.6 4.1   CHLORIDE 102 104 114*   CO2 22 21 16*   BUN 32* 32* 32*   CR 2.46* 2.29* 2.08*   ANIONGAP 7 6 12   KEV 8.1* 8.1* 8.0*   GLC 98 122* 63*   ALBUMIN 2.4* 2.5* 3.0*   PROTTOTAL  --  6.1* 7.1   BILITOTAL  --  1.1 0.7   ALKPHOS  --  189* 218*   ALT  --  21 22   AST  --  35 34       No results found for this or any  previous visit (from the past 24 hour(s)).

## 2021-06-29 ENCOUNTER — APPOINTMENT (OUTPATIENT)
Dept: PHYSICAL THERAPY | Facility: CLINIC | Age: 66
DRG: 432 | End: 2021-06-29
Attending: INTERNAL MEDICINE
Payer: MEDICARE

## 2021-06-29 ENCOUNTER — APPOINTMENT (OUTPATIENT)
Dept: ULTRASOUND IMAGING | Facility: CLINIC | Age: 66
DRG: 432 | End: 2021-06-29
Attending: INTERNAL MEDICINE
Payer: MEDICARE

## 2021-06-29 ENCOUNTER — APPOINTMENT (OUTPATIENT)
Dept: CT IMAGING | Facility: CLINIC | Age: 66
DRG: 432 | End: 2021-06-29
Attending: INTERNAL MEDICINE
Payer: MEDICARE

## 2021-06-29 LAB
ALBUMIN SERPL-MCNC: 2.4 G/DL (ref 3.4–5)
ANION GAP SERPL CALCULATED.3IONS-SCNC: 5 MMOL/L (ref 3–14)
APPEARANCE FLD: CLEAR
BASOPHILS # BLD AUTO: 0 10E9/L (ref 0–0.2)
BASOPHILS NFR BLD AUTO: 0.3 %
BUN SERPL-MCNC: 33 MG/DL (ref 7–30)
CALCIUM SERPL-MCNC: 8.4 MG/DL (ref 8.5–10.1)
CHLORIDE SERPL-SCNC: 105 MMOL/L (ref 94–109)
CO2 SERPL-SCNC: 23 MMOL/L (ref 20–32)
COLOR FLD: YELLOW
CREAT SERPL-MCNC: 2.71 MG/DL (ref 0.66–1.25)
CREAT UR-MCNC: 76 MG/DL
DIFFERENTIAL METHOD BLD: ABNORMAL
EOSINOPHIL # BLD AUTO: 0.1 10E9/L (ref 0–0.7)
EOSINOPHIL NFR BLD AUTO: 1.9 %
ERYTHROCYTE [DISTWIDTH] IN BLOOD BY AUTOMATED COUNT: 18.5 % (ref 10–15)
FRACT EXCRET NA UR+SERPL-RTO: 0.1 %
GFR SERPL CREATININE-BSD FRML MDRD: 23 ML/MIN/{1.73_M2}
GLUCOSE SERPL-MCNC: 104 MG/DL (ref 70–99)
HCT VFR BLD AUTO: 23.7 % (ref 40–53)
HGB BLD-MCNC: 7.7 G/DL (ref 13.3–17.7)
IMM GRANULOCYTES # BLD: 0 10E9/L (ref 0–0.4)
IMM GRANULOCYTES NFR BLD: 0.5 %
LYMPHOCYTES # BLD AUTO: 0.3 10E9/L (ref 0.8–5.3)
LYMPHOCYTES NFR BLD AUTO: 6.9 %
LYMPHOCYTES NFR FLD MANUAL: 23 %
MCH RBC QN AUTO: 30.1 PG (ref 26.5–33)
MCHC RBC AUTO-ENTMCNC: 32.5 G/DL (ref 31.5–36.5)
MCV RBC AUTO: 93 FL (ref 78–100)
MONOCYTES # BLD AUTO: 0.3 10E9/L (ref 0–1.3)
MONOCYTES NFR BLD AUTO: 9 %
MONOS+MACROS NFR FLD MANUAL: 62 %
NEUTROPHILS # BLD AUTO: 3.1 10E9/L (ref 1.6–8.3)
NEUTROPHILS NFR BLD AUTO: 81.4 %
NEUTS BAND NFR FLD MANUAL: 7 %
NRBC # BLD AUTO: 0 10*3/UL
NRBC BLD AUTO-RTO: 0 /100
OSMOLALITY UR: 238 MMOL/KG (ref 100–1200)
OTHER CELLS FLD MANUAL: 8 %
PHOSPHATE SERPL-MCNC: 3.1 MG/DL (ref 2.5–4.5)
PLATELET # BLD AUTO: 114 10E9/L (ref 150–450)
POTASSIUM SERPL-SCNC: 4.9 MMOL/L (ref 3.4–5.3)
RBC # BLD AUTO: 2.56 10E12/L (ref 4.4–5.9)
SODIUM SERPL-SCNC: 133 MMOL/L (ref 133–144)
SODIUM UR-SCNC: 5 MMOL/L
SPECIMEN SOURCE FLD: NORMAL
WBC # BLD AUTO: 3.8 10E9/L (ref 4–11)
WBC # FLD AUTO: 149 /UL

## 2021-06-29 PROCEDURE — 99233 SBSQ HOSP IP/OBS HIGH 50: CPT | Performed by: INTERNAL MEDICINE

## 2021-06-29 PROCEDURE — 250N000013 HC RX MED GY IP 250 OP 250 PS 637: Performed by: INTERNAL MEDICINE

## 2021-06-29 PROCEDURE — 97116 GAIT TRAINING THERAPY: CPT | Mod: GP | Performed by: PHYSICAL THERAPIST

## 2021-06-29 PROCEDURE — 83935 ASSAY OF URINE OSMOLALITY: CPT | Performed by: INTERNAL MEDICINE

## 2021-06-29 PROCEDURE — 258N000003 HC RX IP 258 OP 636: Performed by: INTERNAL MEDICINE

## 2021-06-29 PROCEDURE — 88112 CYTOPATH CELL ENHANCE TECH: CPT | Mod: TC | Performed by: INTERNAL MEDICINE

## 2021-06-29 PROCEDURE — 88305 TISSUE EXAM BY PATHOLOGIST: CPT | Mod: TC | Performed by: INTERNAL MEDICINE

## 2021-06-29 PROCEDURE — 85025 COMPLETE CBC W/AUTO DIFF WBC: CPT | Performed by: INTERNAL MEDICINE

## 2021-06-29 PROCEDURE — H0001 ALCOHOL AND/OR DRUG ASSESS: HCPCS

## 2021-06-29 PROCEDURE — 999N000154 HC STATISTIC RADIOLOGY XRAY, US, CT, MAR, NM

## 2021-06-29 PROCEDURE — 80069 RENAL FUNCTION PANEL: CPT | Performed by: INTERNAL MEDICINE

## 2021-06-29 PROCEDURE — 89051 BODY FLUID CELL COUNT: CPT | Performed by: INTERNAL MEDICINE

## 2021-06-29 PROCEDURE — 88112 CYTOPATH CELL ENHANCE TECH: CPT | Mod: 26 | Performed by: PATHOLOGY

## 2021-06-29 PROCEDURE — 82570 ASSAY OF URINE CREATININE: CPT | Performed by: INTERNAL MEDICINE

## 2021-06-29 PROCEDURE — 97530 THERAPEUTIC ACTIVITIES: CPT | Mod: GP | Performed by: PHYSICAL THERAPIST

## 2021-06-29 PROCEDURE — 250N000011 HC RX IP 250 OP 636: Performed by: INTERNAL MEDICINE

## 2021-06-29 PROCEDURE — 999N001018 HC STATISTIC H-CELL BLOCK W/STAIN: Performed by: INTERNAL MEDICINE

## 2021-06-29 PROCEDURE — 87070 CULTURE OTHR SPECIMN AEROBIC: CPT | Performed by: INTERNAL MEDICINE

## 2021-06-29 PROCEDURE — P9041 ALBUMIN (HUMAN),5%, 50ML: HCPCS | Performed by: INTERNAL MEDICINE

## 2021-06-29 PROCEDURE — 74176 CT ABD & PELVIS W/O CONTRAST: CPT | Mod: MG

## 2021-06-29 PROCEDURE — 49083 ABD PARACENTESIS W/IMAGING: CPT

## 2021-06-29 PROCEDURE — 120N000001 HC R&B MED SURG/OB

## 2021-06-29 PROCEDURE — 84300 ASSAY OF URINE SODIUM: CPT | Performed by: INTERNAL MEDICINE

## 2021-06-29 PROCEDURE — 0W9G3ZZ DRAINAGE OF PERITONEAL CAVITY, PERCUTANEOUS APPROACH: ICD-10-PCS | Performed by: RADIOLOGY

## 2021-06-29 PROCEDURE — 250N000011 HC RX IP 250 OP 636: Performed by: HOSPITALIST

## 2021-06-29 PROCEDURE — 999N001014 HC STATISTIC CYTO WRIGHT STAIN TC: Performed by: INTERNAL MEDICINE

## 2021-06-29 PROCEDURE — 88305 TISSUE EXAM BY PATHOLOGIST: CPT | Mod: 26 | Performed by: PATHOLOGY

## 2021-06-29 PROCEDURE — 97161 PT EVAL LOW COMPLEX 20 MIN: CPT | Mod: GP | Performed by: PHYSICAL THERAPIST

## 2021-06-29 PROCEDURE — 36415 COLL VENOUS BLD VENIPUNCTURE: CPT | Performed by: INTERNAL MEDICINE

## 2021-06-29 PROCEDURE — 250N000013 HC RX MED GY IP 250 OP 250 PS 637: Performed by: HOSPITALIST

## 2021-06-29 PROCEDURE — 87075 CULTR BACTERIA EXCEPT BLOOD: CPT | Performed by: INTERNAL MEDICINE

## 2021-06-29 RX ORDER — ALBUMIN, HUMAN INJ 5% 5 %
12.5 SOLUTION INTRAVENOUS EVERY 6 HOURS
Status: COMPLETED | OUTPATIENT
Start: 2021-06-29 | End: 2021-06-30

## 2021-06-29 RX ORDER — LIDOCAINE HYDROCHLORIDE 10 MG/ML
10 INJECTION, SOLUTION EPIDURAL; INFILTRATION; INTRACAUDAL; PERINEURAL ONCE
Status: COMPLETED | OUTPATIENT
Start: 2021-06-29 | End: 2021-06-29

## 2021-06-29 RX ORDER — MIDODRINE HYDROCHLORIDE 5 MG/1
10 TABLET ORAL
Status: DISCONTINUED | OUTPATIENT
Start: 2021-06-29 | End: 2021-07-14 | Stop reason: HOSPADM

## 2021-06-29 RX ORDER — ALBUMIN (HUMAN) 12.5 G/50ML
12.5 SOLUTION INTRAVENOUS ONCE
Status: DISCONTINUED | OUTPATIENT
Start: 2021-06-29 | End: 2021-06-29 | Stop reason: CLARIF

## 2021-06-29 RX ADMIN — THIAMINE HCL TAB 100 MG 100 MG: 100 TAB at 21:36

## 2021-06-29 RX ADMIN — ALBUMIN HUMAN 12.5 G: 0.05 INJECTION, SOLUTION INTRAVENOUS at 12:41

## 2021-06-29 RX ADMIN — MICONAZOLE NITRATE: 20 POWDER TOPICAL at 21:39

## 2021-06-29 RX ADMIN — MULTIPLE VITAMINS W/ MINERALS TAB 1 TABLET: TAB at 08:24

## 2021-06-29 RX ADMIN — NICOTINE 1 PATCH: 21 PATCH, EXTENDED RELEASE TRANSDERMAL at 22:20

## 2021-06-29 RX ADMIN — TAMSULOSIN HYDROCHLORIDE 0.8 MG: 0.4 CAPSULE ORAL at 08:25

## 2021-06-29 RX ADMIN — FOLIC ACID 1 MG: 1 TABLET ORAL at 08:25

## 2021-06-29 RX ADMIN — FINASTERIDE 5 MG: 5 TABLET, FILM COATED ORAL at 08:25

## 2021-06-29 RX ADMIN — VORTIOXETINE 10 MG: 10 TABLET, FILM COATED ORAL at 21:35

## 2021-06-29 RX ADMIN — ALBUMIN HUMAN 12.5 G: 0.05 INJECTION, SOLUTION INTRAVENOUS at 18:20

## 2021-06-29 RX ADMIN — OXYCODONE HYDROCHLORIDE 5 MG: 5 TABLET ORAL at 08:37

## 2021-06-29 RX ADMIN — THIAMINE HCL TAB 100 MG 100 MG: 100 TAB at 08:24

## 2021-06-29 RX ADMIN — QUETIAPINE 200 MG: 200 TABLET, FILM COATED ORAL at 21:36

## 2021-06-29 RX ADMIN — DEXTROSE AND SODIUM CHLORIDE: 5; 900 INJECTION, SOLUTION INTRAVENOUS at 10:47

## 2021-06-29 RX ADMIN — QUETIAPINE FUMARATE 50 MG: 25 TABLET ORAL at 18:20

## 2021-06-29 RX ADMIN — MICONAZOLE NITRATE: 20 POWDER TOPICAL at 08:38

## 2021-06-29 RX ADMIN — MIDODRINE HYDROCHLORIDE 10 MG: 5 TABLET ORAL at 18:20

## 2021-06-29 RX ADMIN — OXYCODONE HYDROCHLORIDE 5 MG: 5 TABLET ORAL at 15:30

## 2021-06-29 RX ADMIN — GABAPENTIN 300 MG: 300 CAPSULE ORAL at 08:26

## 2021-06-29 RX ADMIN — GABAPENTIN 300 MG: 300 CAPSULE ORAL at 21:36

## 2021-06-29 RX ADMIN — ONDANSETRON 4 MG: 4 TABLET, ORALLY DISINTEGRATING ORAL at 15:45

## 2021-06-29 RX ADMIN — PANTOPRAZOLE SODIUM 40 MG: 40 TABLET, DELAYED RELEASE ORAL at 21:36

## 2021-06-29 RX ADMIN — GABAPENTIN 300 MG: 300 CAPSULE ORAL at 15:30

## 2021-06-29 RX ADMIN — CARBIDOPA AND LEVODOPA 2.5 MG: 50; 200 TABLET, EXTENDED RELEASE ORAL at 08:24

## 2021-06-29 RX ADMIN — THIAMINE HCL TAB 100 MG 100 MG: 100 TAB at 15:30

## 2021-06-29 RX ADMIN — LIDOCAINE HYDROCHLORIDE 10 ML: 10 INJECTION, SOLUTION EPIDURAL; INFILTRATION; INTRACAUDAL; PERINEURAL at 13:51

## 2021-06-29 RX ADMIN — TRAZODONE HYDROCHLORIDE 100 MG: 100 TABLET ORAL at 21:35

## 2021-06-29 RX ADMIN — MAGNESIUM OXIDE TAB 400 MG (241.3 MG ELEMENTAL MG) 400 MG: 400 (241.3 MG) TAB at 08:25

## 2021-06-29 RX ADMIN — MIDODRINE HYDROCHLORIDE 10 MG: 5 TABLET ORAL at 12:53

## 2021-06-29 RX ADMIN — MIRTAZAPINE 30 MG: 15 TABLET, FILM COATED ORAL at 21:35

## 2021-06-29 RX ADMIN — PANTOPRAZOLE SODIUM 40 MG: 40 TABLET, DELAYED RELEASE ORAL at 08:26

## 2021-06-29 RX ADMIN — VORTIOXETINE 10 MG: 10 TABLET, FILM COATED ORAL at 08:25

## 2021-06-29 RX ADMIN — QUETIAPINE FUMARATE 50 MG: 25 TABLET ORAL at 11:12

## 2021-06-29 ASSESSMENT — ACTIVITIES OF DAILY LIVING (ADL)
ADLS_ACUITY_SCORE: 13

## 2021-06-29 NOTE — CONSULTS
Consult Date: 06/29/2021    RENAL CONSULT    REQUESTING PHYSICIAN:  Dhaval Snyder MD    REASON FOR CONSULTATION:  Acute kidney injury.    HISTORY OF PRESENT ILLNESS:  This is a 64-year-old admitted on 06/25/2021 with alcohol intoxication and chronic alcoholism.  He complained of abdominal distention.  He has a history of liver failure with cirrhosis and ascites.  He has hypoglycemia from liver failure and varices with history of bleeding and banding.  He has had weekly paracenteses done.  Other past medical history includes obstructive sleep apnea, on CPAP, and COPD as well as peripheral vascular disease.  After admission, he underwent a large volume paracentesis, with 6 liters removed on 06/26/2021.  Prior to admission, he has been on magnesium oxide, folate and midodrine.  I do not get a history of nonsteroidal anti-inflammatory drug use or IV contrast exposure.  On 06/13/2021, his creatinine was 2.11 and estimated GFR of 31 and he appears to have a baseline creatinine of 1.9-2.2, consistent with chronic kidney disease stage IIIB.  On admission, his creatinine was 2.08 and today it is 2.71, so he has had a mild bump in creatinine.  We are asked to see him in consultation for acute kidney injury.    The patient notes abdominal distention, but denies abdominal pain.  He has no nausea or vomiting.  He has no shortness of breath and is not on oxygen.  He denies chest pain.  He is ambulating with Physical Therapy.    CURRENT MEDICATIONS:  Finasteride and Flomax.  He has been on low-dose midodrine.  He has been on folate and magnesium oxide.    PAST SURGICAL HISTORY:  Includes appendectomy, femoral popliteal bypass surgery, hernia, T and A, lumbar laminectomy.    SOCIAL HISTORY:  [place]  He lives alone.  He continues to smoke and drink.    FAMILY HISTORY:  The mother had ASCVD.  The father had lung cancer.    PHYSICAL EXAMINATION:    GENERAL:  He is alert.  He is sitting up in bed.  He looks  much older than stated age.    HEENT:  His head shows no trauma.  The eyes show pupils, round and react to light.  Mouth shows good dentition.  NECK:  Supple.  LUNGS:  Diminished breath sounds with some egophony in the left base.    CARDIAC:  No jugular venous distention.  Regular rhythm.  Normal S1, S2, no murmur.    ABDOMEN:  Tense and distended with a positive fluid wave.    EXTREMITIES:  Lower extremities: 1+ edema.  Upper extremities:  No clubbing or cyanosis.    GENITOURINARY:  Leger catheter in place.   NEUROLOGIC:  Cranial nerves are symmetric and he has a normal mental status.   VITAL SIGNS:  His weight on admission was 86.2 and today it is 80.5.  He is afebrile, with a pulse of 90, blood pressure 149/80, respiratory rate 18.    IMPRESSION:     1.  Chronic kidney disease IIIB, with a baseline creatinine of 1.9-2.2.  This is of uncertain etiology.  He does not really give a history of hypertension, diabetes or nephrotoxic exposure.  2.  Acute kidney injury.  His creatinine has risen from 2.08-2.71.  We do not have enough data right now to determine the cause.  I am going to send laboratories including a urine osmolality, urine sodium and plasma osmolality.  We will send a fractional excretion of sodium and a urinalysis.  We will recheck his laboratories in the morning, including a basic metabolic panel, magnesium and phosphorus.  For now, we will treat him with saline, albumin and midodrine.  If his kidney function continues to worsen and he turns out to have laboratories consistent with hepatorenal syndrome, we will likely add octreotide.  He is actively drinking, and so not a liver transplant candidate.  Therefore, hepatorenal syndrome would be a fatal event.  3.  Anemia.  This is part of pancytopenia.  We will check iron, B12 and folate levels to see if some replacement might help.  4.  Liver failure.  He is going to undergo a paracentesis today.  Internal Medicine Service will be managing this along  presumably with the GI Service.  5.  Alcoholism.    Arnaldo Frankel MD        D: 2021   T: 2021   MT: [id number]    Name:     DEVONTE SEGURA  MRN:      [id number]        Account:      [id number]   :      1955           Consult Date: 2021     Document: [id number]

## 2021-06-29 NOTE — PROGRESS NOTES
"   06/29/21 1010   Quick Adds   Type of Visit Initial PT Evaluation   Living Environment   People in home alone   Current Living Arrangements house   Home Accessibility stairs to enter home   Number of Stairs, Main Entrance   (no SHANON via garage )   Number of Stairs, Within Home, Primary 6   Stair Railings, Within Home, Primary railings safe and in good condition   Transportation Anticipated car, drives self   Living Environment Comments does not have assistive devices at home, may be interested in getting a walker or cane    Self-Care   Usual Activity Tolerance fair   Current Activity Tolerance poor   Regular Exercise No   Equipment Currently Used at Home none   Activity/Exercise/Self-Care Comment Pt is independent with all ADLs/IADLs and reports no one assist, pt drives and drives to his weekly paracentesis however states \"sometimes i can't make it and I call an ambulance to drive me, it's expensive\".    Disability/Function   Fall history within last six months yes   Number of times patient has fallen within last six months 2   General Information   Onset of Illness/Injury or Date of Surgery 06/25/21   Referring Physician Dhaval Snyder MD   Patient/Family Therapy Goals Statement (PT) get better, get more rehab    Pertinent History of Current Problem (include personal factors and/or comorbidities that impact the POC) 66 year old male chronic alcoholism, cirrhosis and ascites.  He undergoes weekly paracenteses but missed his procedure on 6/24 because he was out drinking alcohol.  He presented to the emergency room complaining of abdominal distention.  He was found to be intoxicated (WATTERS .34).  Admitted and underwent therapeutic paracentesis.   Existing Precautions/Restrictions fall   Weight-Bearing Status - LLE full weight-bearing   Weight-Bearing Status - RLE full weight-bearing   Cognition   Orientation Status (Cognition) oriented x 4   Pain Assessment   Patient Currently in Pain Yes, see Vital Sign " flowsheet  (6/10 upper abdomen/under rib area,pain mentioned in left leg)   Strength Comprehensive (MMT)   Comment, General Manual Muscle Testing (MMT) Assessment generalized weakness, pain in left leg, decreased stance in gait    Bed Mobility   Comment (Bed Mobility) SBA supine <> sit    Transfers   Transfer Safety Comments CGA sit <> stand with FWW    Gait/Stairs (Locomotion)   [place] Level (Gait) contact guard   Assistive Device (Gait) walker, front-wheeled   Distance in Feet (Required for LE Total Joints) 350'   Comment (Gait/Stairs) unsteady with variable step length    Balance   Balance Comments increased need for assistive device with gait    Clinical Impression   Criteria for Skilled Therapeutic Intervention yes, treatment indicated   PT Diagnosis (PT) decreased mobility    Influenced by the following impairments impaired balance, decreased strength, abdomen pain    Functional limitations due to impairments fall risk, decreased activity tolerance, increased assist needed with walking    Clinical Presentation Stable/Uncomplicated   Clinical Presentation Rationale clinical judgement    Clinical Decision Making (Complexity) low complexity   Therapy Frequency (PT) 5x/week   Predicted Duration of Therapy Intervention (days/wks) 7 days    Planned Therapy Interventions (PT) balance training;bed mobility training;gait training;stair training;strengthening;transfer training   Anticipated Equipment Needs at Discharge (PT) walker, rolling   Risk & Benefits of therapy have been explained evaluation/treatment results reviewed;care plan/treatment goals reviewed;risks/benefits reviewed;current/potential barriers reviewed;participants voiced agreement with care plan;participants included;patient   PT Discharge Planning    PT Discharge Recommendation (DC Rec) Transitional Care Facility   PT Rationale for DC Rec Pt lives alone and is needing increased assistance with mobiltiy including assist with bed mob, transfers,  and gait. Would benefit from inpatient PT and continued PT in skilled TCU setting to improve safety and allow for return to home alone. j   PT Brief overview of current status  SBA with bed mob/transfers, CGA and FWW with gait    Total Evaluation Time   Total Evaluation Time (Minutes) 10

## 2021-06-29 NOTE — PROGRESS NOTES
2021      Type Of Assessment: Inpatient Substance Use Comprehensive Assessment    Referral Source:  [facility]  MRN: [id number]    DATE OF SERVICE: 2021  Date of previous ALYX Assessment: 10/27/19  Patient confirmed identity through two factor verification: Full Legal Name and     PATIENT'S NAME: Jim Richard  Age: 66 year old  Last 4 SSN: [id number]  Sex: male   Gender Identity: male  Sexual Orientation: Heterosexual  Cultural Background: No, Denies any cultural influences or concerns that need to be considered for treatment  YOB: 1955  Current Address:   [address] [place] [id number]  Patient Phone Number:  [phone]  Patient's E-Mail Contact:  YEIMY  Funding: Medicare  PMI: YEIMY [place] information was provided to patient and patient does not want a copy.     Emergency Contact: Brother [phone] Jame Richard    Telemedicine Visit: The patient's condition can be safely assessed and treated via synchronous audio and visual telemedicine encounter.    Reason for Telemedicine Visit: Services only offered telehealth  Originating Site (Patient Location): [facility] [address] [place] [id number]     Distant Site (Provider Location): Provider Remote Setting- Home Office  Consent:  The patient/guardian has verbally consented to: the potential risks and benefits of telemedicine (video visit) versus in person care; bill my insurance or make self-payment for services provided; and responsibility for payment of non-covered services.   Mode of Communication:  Video Conference via [employer]    START TIME: 928 am  END TIME: 950 am    As the provider I attest to compliance with applicable laws and regulations related to telemedicine.     Jim Richard was seen for a substance use disorder consult on 2021 by SYLVIE Maria.    Reason for Substance Use Disorder Consult:  Per H&P  /  Chief Complaint      Abdominal distention      History is obtained  from the patient, electronic health record and emergency department physician     History of Present Illness     Jim Richard is a 66 year old male chronic alcoholism, cirrhosis and ascites.  He undergoes weekly paracenteses but missed his procedure yesterday because he was out drinking alcohol.  He presented to the emergency room today complaining of abdominal distention.  He was found to be intoxicated.  He is going to be admitted so he can undergo a therapeutic paracentesis tomorrow.  He is feeling little short of breath due to the marked abdominal distention.  It is also very uncomfortable.  He has been constipated.  He has been drinking vodka regularly and his alcohol level was 0.34.  EKG showed sinus rhythm with an old right bundle branch block.  Chest x-ray was negative.  Labs showed hemoglobin 8.4 g lactic acid 3.6 ammonia 33 INR 1.24 creatinine 2.08.  Glucose was low and he was treated with 50% dextrose.  He also received IV Dilaudid for the abdominal pain.       Are you currently having severe withdrawal symptoms that are putting yourself or others in danger? No  Are you currently having severe medical problems that require immediate attention? No  Are you currently having severe emotional or behavioral problems that are putting yourself or others at risk of harm? No    Have you participated in prior substance use disorder evaluations? Yes. When, Where, and What circumstances: [facility]  6/29/21 , update 2/13/20  Have you ever been to detox, inpatient or outpatient treatment for substance related use? List previous treatment: Yes. When, Where, and What circumstances: Detox several times , treatment at least 5 times, 3x in last 2 years [place], [place].  Have you ever had a gambling problem or had treatment for compulsive gambling? No  Patient does not appear to be in severe withdrawal, an imminent safety risk to self or others, or requiring immediate medical attention and may proceed with the assessment  interview.    Comprehensive Substance Use History   X X = Primary Drug Used Age of First Use    Pattern of Substance Use   (heaviest use in life and a use history within the past year if applicable) (DSM-5: Sx #3) Date /  Quantity of last use if within the past 30 days Withdrawal Potential?   Method of use  (Oral, smoked, snorted, IV, etc)    Alcohol   [age] years daily off and on with periods of tx and sobriety Pint -.75 vodka daily Yes Oral    Marijuana/Hashish   17 Used in lifetime       Cocaine/Crack 20's Used in lifetime       Meth/Amphetamines   No use        Heroin   No use        Other Opiates/Synthetics   No use        Inhalants  No use        Benzodiazepines   No use        Hallucinogens   No use        Barbiturates/Sedatives/Hypnotics   No use        Over-the-Counter Drugs   No use        Other   No use        Nicotine   15 Cigarettes daily Pack daily  Smoke     Withdrawal symptoms: Have you had any of the following withdrawal symptoms?  Sweating (Rapid Pulse)  Shaky / Jittery / Tremors  Unable to Sleep  Agitation  Headache  Fatigue / Extremely Tired  Sad / Depressed Feeling  Irritability  Sensitivity to Noise  Nausea / Vomiting  Diarrhea  Diminished Appetite  Hallucinations  Confused / Disrupted Speech  Anxiety / Worried    Have you experienced any cravings?  Yes    Have you had periods of abstinence?  Yes  What was your longest period? Pt reports he was sober 90 days last year.    Any circumstances that lead to relapse? Pt reports finances or family stuff lead to relapse.     What activities have you engaged in when using alcohol/other drugs that could be hazardous to you or others? (i.e. driving a car/motorcycle/boat, operating machinery, unsafe sex, sharing needles for drugs or tattoos, etc ) The patient reported having a history of driving while under the influence of alcohol or drugs.    A description of any risk-taking behavior, including behavior that puts the client at risk of exposure to  blood-borne or sexually transmitted diseases: NA    Arrests and legal interventions related to substance use:  History of 2 DUI's    A description of how the patient's use affected their ability to function appropriately in a work setting: Pt reports no issues when working.     A description of how the patient's use affected their ability to function appropriately in an educational setting: NA    Leisure time activities that are associated with substance use:  Pt reports no hobbies, just drinking.    Do you think your substance use has become a problem for you? He agrees he has a substance abuse problem.    MEDICAL HISTORY  Physical or medical concerns or diagnoses: Per H&P  Ascites   Cirrhosis, portal hypertension  Alcohol dependence and intoxication   Anxiety and depression   Chronic obstructive lung disease   Coronary artery disease   Chronic hypotension  Obstructive sleep apnea   Chronic kidney disease  Lactic acidosis  Hypoglycemia due to liver dysfunction  Past Medical History:   Diagnosis Date     Alcoholism (H) 1/14/2013    had treatment at [facility]     Anxiety      COPD (chronic obstructive pulmonary disease) (H)      Coronary artery disease 09/09/2014    Nuclear stress test with slight fixed defect inferiorly, no ischemia     Depression     w/anxiety     Esophageal varices with bleeding 04/28/2018    6 varices banded on EGD     Heart attack (H)      Hepatomegaly     Fatty liver, chronic alcoholic     Hyperlipemia      Hypertension      JANIS on CPAP      Peripheral vascular disease (H)      PVD (peripheral vascular disease) (H)      SDH (subdural hematoma) (H) 04/26/2018    Right side, resolved spontaneously     Spinal stenosis      Substance abuse (H)        Do you have any current medical treatment needs not being addressed by inpatient treatment?  Pt is currently hospitalized.     Do you need a referral for a medical provider?  Pt has a medical provider.     Current medications: Per EHR  Current  Facility-Administered Medications   Medication     albumin human 5 % injection 12.5 g     albuterol (PROAIR HFA/PROVENTIL HFA/VENTOLIN HFA) 108 (90 Base) MCG/ACT inhaler 1-2 puff     dextrose 5% and 0.9% NaCl infusion     eszopiclone (LUNESTA) tablet 2 mg     finasteride (PROSCAR) tablet 5 mg     flumazenil (ROMAZICON) injection 0.2 mg     fluticasone-vilanterol (BREO ELLIPTA) 200-25 MCG/INH inhaler 1 puff     folic acid (FOLVITE) tablet 1 mg     gabapentin (NEURONTIN) capsule 300 mg     OLANZapine zydis (zyPREXA) ODT tab 5-10 mg    Or     haloperidol lactate (HALDOL) injection 2.5-5 mg     lidocaine (LMX4) cream     lidocaine (XYLOCAINE) 2 % external gel     lidocaine 1 % 0.1-1 mL     lidocaine 1 % 10 mL     LORazepam (ATIVAN) tablet 1-2 mg    Or     LORazepam (ATIVAN) injection 1-2 mg     magnesium oxide (MAG-OX) tablet 400 mg     May take regular AM medications except those listed below.     melatonin tablet 5 mg     miconazole (MICATIN) 2 % powder     midodrine (PROAMATINE) tablet 10 mg     mirtazapine (REMERON) tablet 30 mg     multivitamin w/minerals (THERA-VIT-M) tablet 1 tablet     naloxone (NARCAN) injection 0.2 mg    Or     naloxone (NARCAN) injection 0.4 mg    Or     naloxone (NARCAN) injection 0.2 mg    Or     naloxone (NARCAN) injection 0.4 mg     nicotine (NICODERM CQ) 21 MG/24HR 24 hr patch 1 patch     nicotine Patch in Place     ondansetron (ZOFRAN-ODT) ODT tab 4 mg    Or     ondansetron (ZOFRAN) injection 4 mg     oxyCODONE (ROXICODONE) tablet 5-10 mg     pantoprazole (PROTONIX) EC tablet 40 mg     QUEtiapine (SEROquel) tablet 200 mg     QUEtiapine (SEROquel) tablet 50 mg     sodium chloride (PF) 0.9% PF flush 3 mL     sodium chloride (PF) 0.9% PF flush 3 mL     tamsulosin (FLOMAX) capsule 0.8 mg     thiamine (B-1) tablet 100 mg    Followed by     [START ON 7/3/2021] thiamine (B-1) tablet 100 mg     traZODone (DESYREL) tablet 100 mg     vortioxetine (TRINTELLIX) tablet 10 mg         Are you  pregnant? NA, Male    Do you have any specific physical needs/accommodations? No    MENTAL HEALTH HISTORY:  Have you ever had  hospitalizations or treatment for mental health illness: No    Mental health history, including diagnosis and symptoms, and the effect on the client's ability to function:  Pt reports anxiety and depression.    Current mental health treatment including psychotropic medication needed to maintain stability: (Note: The assessment must utilize screening tools approved by the commissioner pursuant to section 245.4863 to identify whether the client screens positive for co-occurring disorders):  Pt reports a history of med's and therapy. Pt reports he is not currently seeing a therapist but would like to start.      GAIN-SS Tool:  When was the last time that you had significant problems... 6/29/2021   with feeling very trapped, lonely, sad, blue, depressed or hopeless about the future? Past month   with sleep trouble, such as bad dreams, sleeping restlessly, or falling asleep during the day? Past Month   with feeling very anxious, nervous, tense, scared, panicked or like something bad was going to happen? Past month   with becoming very distressed & upset when something reminded you of the past? Past month   with thinking about ending your life or committing suicide? Never     When was the last time that you did the following things 2 or more times? 6/29/2021   Lied or conned to get things you wanted or to avoid having to do something? Never   Had a hard time paying attention at school, work or home? Never   Had a hard time listening to instructions at school, work or home? 1+ years ago   Were a bully or threatened other people? Never   Started physical fights with other people? Never       Have you ever been verbally, emotionally, physically or sexually abused?   The patient denied having any history of being verbally, emotionally, physically or sexually abused.    Family history of substance  use and misuse: Pt reports father and brother have CD issues.    The patient's desire for family involvement in the treatment program: Pt reports his family is not supportive except for one brother.  Level of family support: Brother    Social network in relation to expected support for recovery:  Pt reports he has attended AA in the past but hasn't been in awhile.     Are you currently in a significant relationship? No    Do you have any children (include living arrangements/custody/contact)?:  No children    What is your current living situation? Pt reports he lives in a town home alone.    Are you employed/attending school? Pt reports he's retired from [employer].    SUMMARY:  Ability to understand written treatment materials: Yes  Ability to understand patient rules and patient rights: Yes  Does the patient recognize needs related to substance use and is willing to follow treatment recommendations: Yes  Does the patient have an opioid use disorder:  does not have a history of opiate use.    ASAM Dimension Scale Ratings:  Dimension 1: 0 Client displays full functioning with good ability to tolerate and cope with withdrawal discomfort. No signs or symptoms of intoxication or withdrawal or resolving signs or symptoms.  Dimension 2: 1 Client tolerates and ramone with physical discomfort and is able to get the services that the client needs.  Dimension 3: 2 Client has difficulty with impulse control and lacks coping skills. Client has thoughts of suicide or harm to others without means; however, the thoughts may interfere with participation in some treatment activities. Client has difficulty functioning in significant life areas. Client has moderate symptoms of emotional, behavioral, or cognitive problems. Client is able to participate in most treatment activities.  Dimension 4: 3 Client displays inconsistent compliance, minimal awareness of either the client's addiction or mental disorder, and is minimally  cooperative.  Dimension 5: 4 No awareness of the negative impact of mental health problems or substance abuse. No coping skills to arrest mental health or addiction illnesses, or prevent relapse.  Dimension 6: 4 Client has (A) Chronically antagonistic significant other, living environment, family, peer group or long-term criminal justice involvement that is harmful to recovery or treatment progress, or (B) Client has an actively antagonistic significant other, family, work, or living environment with immediate threat to the client's safety and well-being.    Category of Substance Severity (ICD-10 Code / DSM 5 Code)     Alcohol Use Disorder Severe  (10.20) (303.90)   Cannabis Use Disorder The patient does not meet the criteria for a Cannabis use disorder.   Hallucinogen Use Disorder The patient does not meet the criteria for a Hallucinogen use disorder.   Inhalant Use Disorder The patient does not meet the criteria for an Inhalant use disorder.   Opioid Use Disorder The patient does not meet the criteria for an Opioid use disorder.   Sedative, Hypnotic, or Anxiolytic Use Disorder The patient does not meet the criteria for a Sedative/Hypnotic use disorder.   Stimulant Related Disorder The patient does not meet the criteria for a Stimulant use disorder.   Tobacco Use Disorder Mild    (Z72.0) (305.1)   Other (or unknown) Substance Use Disorder The patient does not meet the criteria for a Other (or unknown) Substance use disorder.     A problematic pattern of alcohol/drug use leading to clinically significant impairment or distress, as manifested by at least two of the following, occurring within a 12-month period:    1.) Alcohol/drug is often taken in larger amounts or over a longer period than was intended.  2.) There is a persistent desire or unsuccessful efforts to cut down or control alcohol/drug use  3.) A great deal of time is spent in activities necessary to obtain alcohol, use alcohol, or recover from its  effects.  4.) Craving, or a strong desire or urge to use alcohol/drug  8.) Recurrent alcohol/drug use in situations in which it is physically hazardous.  9.) Alcohol/drug use is continued despite knowledge of having a persistent or recurrent physical or psychological problem that is likely to have been caused or exacerbated by alcohol.  10.) Tolerance, as defined by either of the following: A need for markedly increased amounts of alcohol/drug to achieve intoxication or desired effect.  11.) Withdrawal, as manifested by either of the following: The characteristic withdrawal syndrome for alcohol/drug (refer to Criteria A and B of the criteria set for alcohol/drug withdrawal).    Specify if: In early remission:  After full criteria for alcohol/drug use disorder were previously met, none of the criteria for alcohol/drug use disorder have been met for at least 3 months but for less than 12 months (with the exception that Criterion A4,  Craving or a strong desire or urge to use alcohol/drug  may be met).     In sustained remission:   After full criteria for alcohol use disorder were previously met, non of the criteria for alcohol/drug use disorder have been met at any time during a period of 12 months or longer (with the exception that Criterion A4,  Craving or strong desire or urge to use alcohol/drug  may be met).     Specify if:   This additional specifier is used if the individual is in an environment where access to alcohol is restricted.    Mild: Presence of 2-3 symptoms  Moderate: Presence of 4-5 symptoms  Severe: Presence of 6 or more symptoms    Collateral information: ALYX Collateral Info: Sufficient information is obtained from the patient to support diagnosis and recommendations. Contact with a collateral sources is not required.    Recommendations:    Pt has been evaluated for chemical dependency. Pt meets criteria for Alcohol Use Disorder. Pt is recommended to:    1. Abstain from all non-prescribed  mood-altering substances  2. Take all medications as prescribed  3. Enter and complete a residential, inpatient treatment program or TCU with CD services  4. Increase sober support, attend AA meetings at least one time weekly, get a sponsor  5. Follow up with scheduling a therapy appointment  6. Follow all recommendations upon discharge from treatment. Recommendations may include, but are not limited to: extended treatment, outpatient treatment and/or sober housing.        7.   Follow all recommendations of your medical providers          ALYX consult completed by: Malina Irving [facility]  Phone Number: [phone]  E-mail Address: [email or web address]  [address] [place] [address]    *Due to regulation of Title 42 of the Code of Federal Regulations (CFR) Part 2: Confidentiality laws apply to this note and the information wherein.  Thus, this note cannot be copy and pasted into any other health care staff's note nor can it be included in general medical records sent to ANY outside agency without the patient's written consent.

## 2021-06-29 NOTE — PROGRESS NOTES
[facility]    Hospitalist Progress Note    Brief Summary:   Jim Richard is a 66 year old male chronic alcoholism, cirrhosis and ascites.  He undergoes weekly paracenteses but missed his procedure yesterday because he was out drinking alcohol.  He presented to the emergency room today complaining of abdominal distention.  He was found to be intoxicated.  He is going to be admitted so he can undergo a therapeutic paracentesis.    Assessment & Plan      Massive Ascites recurrent   Cirrhosis, portal hypertension  Patient missed his weekly paracentesis and presented with makedly distended abdomen, difficulty breathing and discomfort because of that. He is now S/p Therapeutic Paracentesis with about 5.9 liter out. Feeling better now.   He is not on diuretics as diuresis worsen his kidney functions. He is on weekly paracentesis.  S/p Paracentesis on 6/26 and now recurrent again, will do paracentesis today and send fluid for cell count, culture and cytology as complaining of abdominal pain now to rule out SBP      Alcohol dependence and intoxication on admission.  Now Alcohol withdrawal, improving.   Anxiety and depression   He was intoxicated in the emergency room, ethanol level 0.34.  He is on multiple psychiatric medications.  He is now having withdrawal symptoms, CIWA 8 this morning with mainly tremors and sub disorientation this morning. , keep him on alcohol withdrawal, avoid IV fluids at this time.     Continue prior to admission Lunesta, Neurontin, Remeron, Seroquel, Desyrel and Trintellix    Keep him on Thiamine, folic acid and multivitamin.     Check CIWA and give him lorazepam according to CIWA, low CIWA score now.   CD consulted, evaluation pending, he will benefit from inpatient treatment.      Chronic obstructive lung disease     Continue prior to admission inhaled bronchodilators    Not in acute exacerbation at this time.      Coronary artery disease   Chronic hypotension  He  is not on aspirin or a beta-blocker.    Continue midodrine     Obstructive sleep apnea     CPAP as at home     Acute on Chronic kidney disease  Lactic acidosis  Creatinine is at baseline (2.0-2.2).  Lactic acid is elevated due to his liver disease not sepsis.  Creatinine is now trending up and increase from 2.08 to 2.71 now, without any obvious reason,  His blood pressure remain stable, he is not on diuretics or any antibiotics at this time, no NSAID.  He just had paracentesis, need to rule out HRS, consult Nephrology to evaluate the patient, he is eating well   As well. He is on Midodrine, does not look dehydrated either.       Hypoglycemia due to liver dysfunction  This was treated in the emergency room.    Continue to monitor glucometers    No more hypoglycemia at this time.     Pancytopenia   Thrombocytopenia, leukopenia and anemia, this is likely secondary to alcohol abuse  Keep monitoring. Stable at this time.     Hyponatremia   start him on free water restriction of 1500 ml in 24 hrs.   Sodium now improve.     Deconditioning   This is multifactorial related to alcohol abuse, cirrhosis, recurrent ascites   PT and OT recommending TCU, I agree with that.  on board.     Abdominal pain ane LLQ/ Hip pain.   Complaints of abdominal pain, his abdomen distended because of ascites, he does have  Lower abdomen tenderness as well, he has umbilical hernia which is slightly tender now.  Paracentesis today and send fluid for cell count and culture. CT abdomen/pelvis without  Contrast for abdominal pain and tenderness, evaluate left hip as well. If any abnormality will  Consult General surgery to evaluate. May be his umbilical hernia is given this problem.     CD evaluation pending   Paracentesis today and send fluid for analysis.  Nephrology consult for acute on chronic renal failure  CT abdomen and Pelvis without contrast  Will consult Gen surgery if pain not improve after paracentesis.   Decondition and will  need placement once medically stable.     DVT Prophylaxis: Pneumatic Compression Devices  Code Status: No CPR- Do NOT Intubate    Disposition: Expected discharge likely 2 or more days before get stable.     Dhaval Snyder MD  Text Page  (7am - 6pm)    Interval History   Patient complaints of some distended abdomen, left lower abdomen and left hip pain today, no more nausea, vomiting or tremors this morning. Denies any chest pain or SOB, no fever, chills or cough.     No other significant overnight event.     -Data reviewed today: I reviewed all new labs and imaging results over the last 24 hours. I personally reviewed no images or EKG's today.    Physical Exam   Temp: 97.2  F (36.2  C) Temp src: Oral BP: (!) 149/80 Pulse: 90   Resp: 18 SpO2: 98 % O2 Device: None (Room air)    Vitals:    06/25/21 1920 06/25/21 2330   Weight: 86.2 kg (190 lb) 80.5 kg (177 lb 6.4 oz)     Vital Signs with Ranges  Temp:  [96.5  F (35.8  C)-98  F (36.7  C)] 97.2  F (36.2  C)  Pulse:  [73-90] 90  Resp:  [16-18] 18  BP: (123-149)/(71-80) 149/80  SpO2:  [96 %-98 %] 98 %  I/O last 3 completed shifts:  In: 1080 [P.O.:1080]  Out: 1025 [Urine:1025]    Constitutional: awake, alert, cooperative, no apparent distress, and appears stated age  Eyes: Lids and lashes normal, pupils equal, round and reactive to light, extra ocular muscles intact, sclera clear, conjunctiva normal  Respiratory: No increased work of breathing, decrease air entry bilaterally, no crackles or wheezing  Cardiovascular: Normal apical impulse, regular rate and rhythm, normal S1 and S2, no S3 or S4, and no murmur noted  GI: No scars, normal bowel sounds, soft,  Distended because of ascites, umbilical hernia present and slightly tender, lower abdomen and LLQ tenderness, no masses palpated, no hepatosplenomegally  Musculoskeletal: 1+ lower extremity pitting edema present  Neurologic: no focal deficit.     Medications     dextrose 5% and 0.9% NaCl 100 mL/hr at 06/29/21 1047     -  MEDICATION INSTRUCTIONS -         albumin human  12.5 g Intravenous Q6H     finasteride  5 mg Oral Daily     folic acid  1 mg Oral Daily     gabapentin  300 mg Oral TID     lidocaine (buffered or not buffered)  10 mL Intradermal Once     magnesium oxide  400 mg Oral Daily     miconazole   Topical BID     midodrine  10 mg Oral TID w/meals     mirtazapine  30 mg Oral At Bedtime     multivitamin w/minerals  1 tablet Oral Daily     nicotine  1 patch Transdermal Q24H     nicotine   Transdermal Q8H     pantoprazole  40 mg Oral BID     QUEtiapine  200 mg Oral At Bedtime     sodium chloride (PF)  3 mL Intracatheter Q8H     tamsulosin  0.8 mg Oral Daily     thiamine  100 mg Oral TID    Followed by     [START ON 7/3/2021] thiamine  100 mg Oral Daily     traZODone  100 mg Oral At Bedtime     vortioxetine  10 mg Oral BID       Data   Recent Labs   Lab 06/29/21  0703 06/28/21  0754 06/27/21  0803 06/25/21  1931   WBC 3.8* 3.6* 3.9* 7.6   HGB 7.7* 7.5* 7.5* 8.4*   MCV 93 96 94 96   * 125* 120* 231   INR  --   --   --  1.24*    131* 131* 142   POTASSIUM 4.9 4.4 4.6 4.1   CHLORIDE 105 102 104 114*   CO2 23 22 21 16*   BUN 33* 32* 32* 32*   CR 2.71* 2.46* 2.29* 2.08*   ANIONGAP 5 7 6 12   KEV 8.4* 8.1* 8.1* 8.0*   * 98 122* 63*   ALBUMIN 2.4* 2.4* 2.5* 3.0*   PROTTOTAL  --   --  6.1* 7.1   BILITOTAL  --   --  1.1 0.7   ALKPHOS  --   --  189* 218*   ALT  --   --  21 22   AST  --   --  35 34       No results found for this or any previous visit (from the past 24 hour(s)).

## 2021-06-29 NOTE — PROVIDER NOTIFICATION
Dr Snyder notified in person during MD rounding for     Do you want to continue with indwelling jiang catheter or remove it?     FYI: there is an order from 6/26/21 to discontinue jiang catheter.      Orders:  Leave in jiang catheter for now.

## 2021-06-29 NOTE — CONSULTS
6/29/2021    Pt has been interviewed via [facility] and CD Consult has been completed.       Recommendations:    Pt has been evaluated for chemical dependency. Pt meets criteria for Alcohol Use Disorder. Pt is recommended to:    1. Abstain from all non-prescribed mood-altering substances  2. Take all medications as prescribed  3. Enter and complete a residential, inpatient treatment program or TCU with CD services  4. Increase sober support, attend AA meetings at least one time weekly, get a sponsor  5. Follow up with scheduling a therapy appointment  6. Follow all recommendations upon discharge from treatment. Recommendations may include, but are not limited to: extended treatment, outpatient treatment and/or sober housing.        7.   Follow all recommendations of your medical providers          ALYX consult completed by: SYLVIE Maria  Phone Number: [phone]  E-mail Address: [email or web address]  [address] [place] [address]

## 2021-06-29 NOTE — PROGRESS NOTES
Patient is on US schedule for a Therapeutic paracentesis in the afternoon.     -Labs WNL for procedure.    -No NPO required.   -Consent current in chart from this admission.       Please contact the US department at [phone] for procedural related questions.     Thanks [facility] Interventional Radiology ([phone])

## 2021-06-29 NOTE — PLAN OF CARE
A & Ox 4 but forgetful. VSS. C/o abdominal pain, gave prn oxy w/good relief. Abdomen distended, and paracentesis site CDI. On 1500 mL fluid restriction and low Na diet. CIWA's 3,2,2 and no prn needed, has slight tremor. Plan for paracentesis today and CD consult pending. Will discharge to TCU when placement found, SW following.

## 2021-06-29 NOTE — PROGRESS NOTES
1347 Time Out done.    Paracentesis: Pt tolerated well. VSS. 5200 cc yellow fluid removed from abdomen w/o difficulty. Bandaid applied to site - CDI.     ~1417 Pt went to CT after paracentesis and then back to rm 833-2 per cart & transport. .

## 2021-06-29 NOTE — CONSULTS
Care Management Initial Consult    General Information  Assessment completed with: Jim Zendejas  Type of CM/SW Visit: Initial Assessment    Primary Care Provider verified and updated as needed:     Readmission within the last 30 days:        Reason for Consult: discharge planning, substance use concerns  Advance Care Planning:  On file          Communication Assessment  Patient's communication style: spoken language (English or Bilingual)    Hearing Difficulty or Deaf: no   Wear Glasses or Blind: no    Cognitive  Cognitive/Neuro/Behavioral: WDL     Arousal Level: opens eyes spontaneously  Orientation: oriented x 4     Best Language: 0 - No aphasia       Living Environment:   People in home: alone     Current living Arrangements: house      Able to return to prior arrangements: yes  Living Arrangement Comments: Lives alone in house    Family/Social Support:  Care provided by: self  Provides care for: no one     Limited to          Description of Support System:      Support Assessment: Lacks adequate emotional support, Limited social contact and support    Current Resources:   Patient receiving home care services:  Patient stated that he had Home Care but is uncertain of the agency. Chart review indicates [facility] [phone].     Community Resources:    Equipment currently used at home: none  Supplies currently used at home:      Employment/Financial:  Employment Status: retired        Financial Concerns:   None indicated          Lifestyle & Psychosocial Needs:        Socioeconomic History     Marital status: Single     Spouse name: Not on file     Number of children: 2     Years of education: Not on file     Highest education level: Not on file   Occupational History     Occupation: Printer, on disability     Tobacco Use     Smoking status: Current Every Day Smoker     Packs/day: 1.00     Types: Cigarettes     Smokeless tobacco: Never Used     Tobacco comment: Chantix caused nightmares   Substance and  Sexual Activity     Alcohol use: Yes     Comment: 1 pint of vodka a day     Drug use: No     Sexual activity: Not Currently       Functional Status:  Prior to admission patient needed assistance:       Patient had discharged to [facility] at prior admission.  Patient states that he fell three times there but would like to be able to return as he felt that it was a good program.  Patient admits that he is very weak but doesn't want to go to TCU if recommended.  He would prefer to go home with home care.        Mental Health Status:      Hx of Anxiety and Depression,     Chemical Dependency Status:            Alcohol Intoxication, alcoholic liver disease - currently on commitment.     Values/Beliefs:  Spiritual, Cultural Beliefs, Denominational Practices, Values that affect care:                 Additional Information:  Patient under commitment for Chemical Dependency.  Has commitment manager Omayra Anglea [phone].    MIRTA Navarro

## 2021-06-29 NOTE — PROCEDURES
[facility]    Procedure: IR Procedure Note    Date/Time: 6/29/2021 2:20 PM  Performed by: Rosa Maria Meyer MD  Authorized by: Rosa Maria Meyer MD     UNIVERSAL PROTOCOL   Site Marked: NA  Prior Images Obtained and Reviewed:  Yes  Required items: Required blood products, implants, devices and special equipment available    Patient identity confirmed:  Verbally with patient, arm band, provided demographic data and hospital-assigned identification number  Patient was reevaluated immediately before administering moderate or deep sedation or anesthesia  Confirmation Checklist:  Patient's identity using two indicators, relevant allergies, procedure was appropriate and matched the consent or emergent situation and correct equipment/implants were available  Time out: Immediately prior to the procedure a time out was called    Universal Protocol: the Joint Commission Universal Protocol was followed    Preparation: Patient was prepped and draped in usual sterile fashion           ANESTHESIA    Anesthesia: Local infiltration  Local Anesthetic:  Lidocaine 1% without epinephrine      SEDATION    Patient Sedated: No    See dictated procedure note for full details.  Findings: Ascites    Specimens: fluid and/or tissue for gram stain and culture    Complications: None    Condition: Stable    PROCEDURE   Patient Tolerance:  Patient tolerated the procedure well with no immediate complications    Length of time physician/provider present for 1:1 monitoring during sedation: 0

## 2021-06-29 NOTE — PROVIDER NOTIFICATION
Call placed to Dr. Frankel to ask if post-procedure order for additional albumin should be given or not since he already has an order for 5% albumin q6h X 4 doses. Dr. Frankel called back and stated to skip the order for additional albumin post-paracentesis. Order cancelled. Mariya FrenchD

## 2021-06-30 ENCOUNTER — APPOINTMENT (OUTPATIENT)
Dept: OCCUPATIONAL THERAPY | Facility: CLINIC | Age: 66
DRG: 432 | End: 2021-06-30
Payer: MEDICARE

## 2021-06-30 LAB
ALBUMIN SERPL-MCNC: 2.7 G/DL (ref 3.4–5)
AMMONIA PLAS-SCNC: 66 UMOL/L (ref 10–50)
ANION GAP SERPL CALCULATED.3IONS-SCNC: 6 MMOL/L (ref 3–14)
BASOPHILS # BLD AUTO: 0 10E9/L (ref 0–0.2)
BASOPHILS NFR BLD AUTO: 0.6 %
BUN SERPL-MCNC: 32 MG/DL (ref 7–30)
CALCIUM SERPL-MCNC: 8 MG/DL (ref 8.5–10.1)
CHLORIDE SERPL-SCNC: 110 MMOL/L (ref 94–109)
CO2 SERPL-SCNC: 22 MMOL/L (ref 20–32)
CREAT SERPL-MCNC: 2.69 MG/DL (ref 0.66–1.25)
DIFFERENTIAL METHOD BLD: ABNORMAL
EOSINOPHIL # BLD AUTO: 0.1 10E9/L (ref 0–0.7)
EOSINOPHIL NFR BLD AUTO: 3.6 %
ERYTHROCYTE [DISTWIDTH] IN BLOOD BY AUTOMATED COUNT: 18.9 % (ref 10–15)
FERRITIN SERPL-MCNC: 37 NG/ML (ref 26–388)
FOLATE SERPL-MCNC: 40.4 NG/ML
GFR SERPL CREATININE-BSD FRML MDRD: 23 ML/MIN/{1.73_M2}
GLUCOSE BLDC GLUCOMTR-MCNC: 104 MG/DL (ref 70–99)
GLUCOSE BLDC GLUCOMTR-MCNC: 93 MG/DL (ref 70–99)
GLUCOSE SERPL-MCNC: 88 MG/DL (ref 70–99)
HCT VFR BLD AUTO: 23.1 % (ref 40–53)
HGB BLD-MCNC: 7.5 G/DL (ref 13.3–17.7)
IMM GRANULOCYTES # BLD: 0 10E9/L (ref 0–0.4)
IMM GRANULOCYTES NFR BLD: 0.6 %
IRON SATN MFR SERPL: 10 % (ref 15–46)
IRON SERPL-MCNC: 16 UG/DL (ref 35–180)
LYMPHOCYTES # BLD AUTO: 0.3 10E9/L (ref 0.8–5.3)
LYMPHOCYTES NFR BLD AUTO: 8.9 %
MAGNESIUM SERPL-MCNC: 1.9 MG/DL (ref 1.6–2.3)
MCH RBC QN AUTO: 30.5 PG (ref 26.5–33)
MCHC RBC AUTO-ENTMCNC: 32.5 G/DL (ref 31.5–36.5)
MCV RBC AUTO: 94 FL (ref 78–100)
MONOCYTES # BLD AUTO: 0.3 10E9/L (ref 0–1.3)
MONOCYTES NFR BLD AUTO: 9.5 %
NEUTROPHILS # BLD AUTO: 2.6 10E9/L (ref 1.6–8.3)
NEUTROPHILS NFR BLD AUTO: 76.8 %
NRBC # BLD AUTO: 0 10*3/UL
NRBC BLD AUTO-RTO: 0 /100
OSMOLALITY SERPL: 295 MMOL/KG (ref 280–301)
PHOSPHATE SERPL-MCNC: 3.3 MG/DL (ref 2.5–4.5)
PLATELET # BLD AUTO: 106 10E9/L (ref 150–450)
POTASSIUM SERPL-SCNC: 4.3 MMOL/L (ref 3.4–5.3)
RBC # BLD AUTO: 2.46 10E12/L (ref 4.4–5.9)
SODIUM SERPL-SCNC: 138 MMOL/L (ref 133–144)
TIBC SERPL-MCNC: 161 UG/DL (ref 240–430)
VIT B12 SERPL-MCNC: 716 PG/ML (ref 193–986)
WBC # BLD AUTO: 3.4 10E9/L (ref 4–11)

## 2021-06-30 PROCEDURE — 999N000040 HC STATISTIC CONSULT NO CHARGE VASC ACCESS

## 2021-06-30 PROCEDURE — 36415 COLL VENOUS BLD VENIPUNCTURE: CPT | Performed by: INTERNAL MEDICINE

## 2021-06-30 PROCEDURE — P9041 ALBUMIN (HUMAN),5%, 50ML: HCPCS | Performed by: INTERNAL MEDICINE

## 2021-06-30 PROCEDURE — 250N000011 HC RX IP 250 OP 636: Performed by: INTERNAL MEDICINE

## 2021-06-30 PROCEDURE — 82746 ASSAY OF FOLIC ACID SERUM: CPT | Performed by: INTERNAL MEDICINE

## 2021-06-30 PROCEDURE — 97535 SELF CARE MNGMENT TRAINING: CPT | Mod: GO

## 2021-06-30 PROCEDURE — 82140 ASSAY OF AMMONIA: CPT | Performed by: INTERNAL MEDICINE

## 2021-06-30 PROCEDURE — 999N000128 HC STATISTIC PERIPHERAL IV START W/O US GUIDANCE

## 2021-06-30 PROCEDURE — 82728 ASSAY OF FERRITIN: CPT | Performed by: INTERNAL MEDICINE

## 2021-06-30 PROCEDURE — 999N001017 HC STATISTIC GLUCOSE BY METER IP

## 2021-06-30 PROCEDURE — 83540 ASSAY OF IRON: CPT | Performed by: INTERNAL MEDICINE

## 2021-06-30 PROCEDURE — 258N000003 HC RX IP 258 OP 636: Performed by: INTERNAL MEDICINE

## 2021-06-30 PROCEDURE — 99233 SBSQ HOSP IP/OBS HIGH 50: CPT | Performed by: INTERNAL MEDICINE

## 2021-06-30 PROCEDURE — 83550 IRON BINDING TEST: CPT | Performed by: INTERNAL MEDICINE

## 2021-06-30 PROCEDURE — 120N000001 HC R&B MED SURG/OB

## 2021-06-30 PROCEDURE — 85025 COMPLETE CBC W/AUTO DIFF WBC: CPT | Performed by: INTERNAL MEDICINE

## 2021-06-30 PROCEDURE — 83735 ASSAY OF MAGNESIUM: CPT | Performed by: INTERNAL MEDICINE

## 2021-06-30 PROCEDURE — 83930 ASSAY OF BLOOD OSMOLALITY: CPT | Performed by: INTERNAL MEDICINE

## 2021-06-30 PROCEDURE — 250N000013 HC RX MED GY IP 250 OP 250 PS 637: Performed by: INTERNAL MEDICINE

## 2021-06-30 PROCEDURE — 80069 RENAL FUNCTION PANEL: CPT | Performed by: INTERNAL MEDICINE

## 2021-06-30 PROCEDURE — 250N000013 HC RX MED GY IP 250 OP 250 PS 637: Performed by: HOSPITALIST

## 2021-06-30 PROCEDURE — 82607 VITAMIN B-12: CPT | Performed by: INTERNAL MEDICINE

## 2021-06-30 RX ORDER — ALBUMIN, HUMAN INJ 5% 5 %
12.5 SOLUTION INTRAVENOUS EVERY 6 HOURS
Status: COMPLETED | OUTPATIENT
Start: 2021-06-30 | End: 2021-07-01

## 2021-06-30 RX ORDER — LACTULOSE 10 G/15ML
20 SOLUTION ORAL 2 TIMES DAILY
Status: DISCONTINUED | OUTPATIENT
Start: 2021-06-30 | End: 2021-07-13

## 2021-06-30 RX ADMIN — FOLIC ACID 1 MG: 1 TABLET ORAL at 08:19

## 2021-06-30 RX ADMIN — ALBUMIN HUMAN 12.5 G: 0.05 INJECTION, SOLUTION INTRAVENOUS at 12:52

## 2021-06-30 RX ADMIN — MIRTAZAPINE 30 MG: 15 TABLET, FILM COATED ORAL at 21:10

## 2021-06-30 RX ADMIN — OXYCODONE HYDROCHLORIDE 5 MG: 5 TABLET ORAL at 09:10

## 2021-06-30 RX ADMIN — THIAMINE HCL TAB 100 MG 100 MG: 100 TAB at 15:29

## 2021-06-30 RX ADMIN — QUETIAPINE FUMARATE 50 MG: 25 TABLET ORAL at 08:19

## 2021-06-30 RX ADMIN — VORTIOXETINE 10 MG: 10 TABLET, FILM COATED ORAL at 08:19

## 2021-06-30 RX ADMIN — ALBUMIN HUMAN 12.5 G: 0.05 INJECTION, SOLUTION INTRAVENOUS at 06:35

## 2021-06-30 RX ADMIN — PANTOPRAZOLE SODIUM 40 MG: 40 TABLET, DELAYED RELEASE ORAL at 08:19

## 2021-06-30 RX ADMIN — THIAMINE HCL TAB 100 MG 100 MG: 100 TAB at 21:11

## 2021-06-30 RX ADMIN — IRON SUCROSE 200 MG: 20 INJECTION, SOLUTION INTRAVENOUS at 15:24

## 2021-06-30 RX ADMIN — FINASTERIDE 5 MG: 5 TABLET, FILM COATED ORAL at 08:19

## 2021-06-30 RX ADMIN — ALBUMIN HUMAN 12.5 G: 0.05 INJECTION, SOLUTION INTRAVENOUS at 00:38

## 2021-06-30 RX ADMIN — MICONAZOLE NITRATE: 20 POWDER TOPICAL at 10:28

## 2021-06-30 RX ADMIN — MAGNESIUM OXIDE TAB 400 MG (241.3 MG ELEMENTAL MG) 400 MG: 400 (241.3 MG) TAB at 08:20

## 2021-06-30 RX ADMIN — OXYCODONE HYDROCHLORIDE 5 MG: 5 TABLET ORAL at 12:16

## 2021-06-30 RX ADMIN — ALBUMIN HUMAN 12.5 G: 0.05 INJECTION, SOLUTION INTRAVENOUS at 18:32

## 2021-06-30 RX ADMIN — GABAPENTIN 300 MG: 300 CAPSULE ORAL at 08:19

## 2021-06-30 RX ADMIN — TAMSULOSIN HYDROCHLORIDE 0.8 MG: 0.4 CAPSULE ORAL at 08:19

## 2021-06-30 RX ADMIN — MIDODRINE HYDROCHLORIDE 10 MG: 5 TABLET ORAL at 12:16

## 2021-06-30 RX ADMIN — LACTULOSE 20 G: 10 SOLUTION ORAL at 10:28

## 2021-06-30 RX ADMIN — THIAMINE HCL TAB 100 MG 100 MG: 100 TAB at 08:20

## 2021-06-30 RX ADMIN — TRAZODONE HYDROCHLORIDE 100 MG: 100 TABLET ORAL at 21:11

## 2021-06-30 RX ADMIN — LORAZEPAM 1 MG: 1 TABLET ORAL at 18:28

## 2021-06-30 RX ADMIN — NICOTINE 1 PATCH: 21 PATCH, EXTENDED RELEASE TRANSDERMAL at 22:04

## 2021-06-30 RX ADMIN — GABAPENTIN 300 MG: 300 CAPSULE ORAL at 15:29

## 2021-06-30 RX ADMIN — PANTOPRAZOLE SODIUM 40 MG: 40 TABLET, DELAYED RELEASE ORAL at 21:11

## 2021-06-30 RX ADMIN — MIDODRINE HYDROCHLORIDE 10 MG: 5 TABLET ORAL at 08:19

## 2021-06-30 RX ADMIN — MICONAZOLE NITRATE: 20 POWDER TOPICAL at 21:09

## 2021-06-30 RX ADMIN — GABAPENTIN 300 MG: 300 CAPSULE ORAL at 21:10

## 2021-06-30 RX ADMIN — DEXTROSE AND SODIUM CHLORIDE: 5; 900 INJECTION, SOLUTION INTRAVENOUS at 04:13

## 2021-06-30 RX ADMIN — QUETIAPINE 200 MG: 200 TABLET, FILM COATED ORAL at 21:10

## 2021-06-30 RX ADMIN — MULTIPLE VITAMINS W/ MINERALS TAB 1 TABLET: TAB at 08:19

## 2021-06-30 RX ADMIN — QUETIAPINE FUMARATE 50 MG: 25 TABLET ORAL at 15:34

## 2021-06-30 RX ADMIN — MIDODRINE HYDROCHLORIDE 10 MG: 5 TABLET ORAL at 18:28

## 2021-06-30 RX ADMIN — VORTIOXETINE 10 MG: 10 TABLET, FILM COATED ORAL at 21:11

## 2021-06-30 RX ADMIN — LACTULOSE 20 G: 10 SOLUTION ORAL at 21:09

## 2021-06-30 ASSESSMENT — ACTIVITIES OF DAILY LIVING (ADL)
ADLS_ACUITY_SCORE: 14
ADLS_ACUITY_SCORE: 13
ADLS_ACUITY_SCORE: 13
ADLS_ACUITY_SCORE: 14
ADLS_ACUITY_SCORE: 14
ADLS_ACUITY_SCORE: 13

## 2021-06-30 ASSESSMENT — MIFFLIN-ST. JEOR: SCORE: 1507.47

## 2021-06-30 NOTE — PLAN OF CARE
[id number]. Disoriented to time, intermittent forgetful. Ax1 GB/walker. Given oxycodone for abdominal pain prn. VSS on RA. Bilateral upper extremity tremor intermittent. On 1500 mL fluid restriction. PIV infusing D5NS @ 100 mL/hr. Paracentesis sites-L & R w/bandaids CDI. Neph following. Plan to discharge to TCU and  following for discharge needs.

## 2021-06-30 NOTE — PROGRESS NOTES
Assessment and Plan:   NGUYEN: Cr stable. Not improved yet. Cr: 2.46 > 2.71 > 2.69. 6/29 FENa 0.1, Lona 5, Uosm 238, Posm 295. Na 138. UO yest 1175 ml.     Date/Time Weight Who   06/30/21 0616 77.4 kg (170 lb 9.6 oz) RH   06/25/21 2330 80.5 kg (177 lb 6.4 oz) KH   06/25/21 1920 86.2 kg (190 lb)      HRS vs pre-renal. Non-oliguric. On D5NS IV, IV alb, MgOx po, midodrine.  Will give one more day of IVF and alb. Monitor labs.               Interval History:   Alcoholism   Liver failure with cirrhosis and ascites. S/P paracentesis.   Anemia: Ferr 37, Low Fe and Tsat. IV venofer.                  Review of Systems:   C/O shakiness, weakness. C/O umbilical pain, abd distention.           Medications:       finasteride  5 mg Oral Daily     folic acid  1 mg Oral Daily     gabapentin  300 mg Oral TID     lactulose  20 g Oral BID     magnesium oxide  400 mg Oral Daily     miconazole   Topical BID     midodrine  10 mg Oral TID w/meals     mirtazapine  30 mg Oral At Bedtime     multivitamin w/minerals  1 tablet Oral Daily     nicotine  1 patch Transdermal Q24H     nicotine   Transdermal Q8H     pantoprazole  40 mg Oral BID     QUEtiapine  200 mg Oral At Bedtime     sodium chloride (PF)  3 mL Intracatheter Q8H     tamsulosin  0.8 mg Oral Daily     thiamine  100 mg Oral TID    Followed by     [START ON 7/3/2021] thiamine  100 mg Oral Daily     traZODone  100 mg Oral At Bedtime     vortioxetine  10 mg Oral BID       - MEDICATION INSTRUCTIONS -       Current active medications and PTA medications reviewed, see medication list for details.            Physical Exam:   Vitals were reviewed  Patient Vitals for the past 24 hrs:   BP Temp Temp src Pulse Resp SpO2 Weight   06/30/21 0756 131/72 97.5  F (36.4  C) Oral 80 16 97 % --   06/30/21 0616 -- -- -- -- -- -- 77.4 kg (170 lb 9.6 oz)   06/30/21 0036 121/69 97.7  F (36.5  C) Oral 73 16 96 % --   06/1955 110/55 97.7  F (36.5  C) Oral 73 16 97 % --   06/29/21 1819 123/64 -- --  -- 16 96 % --   21 1622 104/59 97.1  F (36.2  C) Oral 82 16 96 % --   21 1615 -- -- -- -- 16 -- --   21 1530 -- -- -- -- 16 -- --   21 1515 124/68 -- -- 86 16 97 % --   21 1447 130/66 -- -- 79 16 98 % --   21 1412 115/65 -- -- 79 16 98 % --   21 1407 113/59 -- -- 80 16 97 % --   21 1354 130/70 -- -- 81 16 98 % --   21 1342 129/74 -- -- 75 16 98 % --   21 1200 113/65 -- -- -- -- 97 % --   21 1125 138/81 97.3  F (36.3  C) Oral 87 18 96 % --       Temp:  [97.1  F (36.2  C)-97.7  F (36.5  C)] 97.5  F (36.4  C)  Pulse:  [73-87] 80  Resp:  [16-18] 16  BP: (104-138)/(55-81) 131/72  SpO2:  [96 %-98 %] 97 %    Temperatures:  Current - Temp: 97.5  F (36.4  C); Max - Temp  Av.5  F (36.4  C)  Min: 97.1  F (36.2  C)  Max: 97.7  F (36.5  C)  Respiration range: Resp  Av.1  Min: 16  Max: 18  Pulse range: Pulse  Av.5  Min: 73  Max: 87  Blood pressure range: Systolic (24hrs), Av , Min:104 , Max:138   ; Diastolic (24hrs), Av, Min:55, Max:81    Pulse oximetry range: SpO2  Av %  Min: 96 %  Max: 98 %    I/O last 3 completed shifts:  In: 1212 [P.O.:240; I.V.:972]  Out:  [Urine:1775]      Intake/Output Summary (Last 24 hours) at 2021 1009  Last data filed at 2021 0600  Gross per 24 hour   Intake 1212 ml   Output 1775 ml   Net -563 ml       Alert, weak  Leger in place  Lungs with clear BS  Cor RRR nl s1 S2 no M no JVD  ABd distended with + fluid wave.   LE R clear, L 1+ edema       Wt Readings from Last 4 Encounters:   21 77.4 kg (170 lb 9.6 oz)   21 81.1 kg (178 lb 11.2 oz)   21 86.2 kg (190 lb)   21 86.2 kg (190 lb)          Data:          Lab Results   Component Value Date     2021     2021     2021    Lab Results   Component Value Date    CHLORIDE 110 2021    CHLORIDE 105 2021    CHLORIDE 102 2021    Lab Results   Component Value Date    BUN 32  06/30/2021    BUN 33 06/29/2021    BUN 32 06/28/2021      Lab Results   Component Value Date    POTASSIUM 4.3 06/30/2021    POTASSIUM 4.9 06/29/2021    POTASSIUM 4.4 06/28/2021    Lab Results   Component Value Date    CO2 22 06/30/2021    CO2 23 06/29/2021    CO2 22 06/28/2021    Lab Results   Component Value Date    CR 2.69 06/30/2021    CR 2.71 06/29/2021    CR 2.46 06/28/2021        Recent Labs   Lab Test 06/30/21  0732 06/29/21  0703 06/28/21  0754   WBC 3.4* 3.8* 3.6*   HGB 7.5* 7.7* 7.5*   HCT 23.1* 23.7* 24.1*   MCV 94 93 96   * 114* 125*     Recent Labs   Lab Test 06/27/21  0803 06/25/21  1931 06/16/21  1205 06/12/21  1522 06/11/21  1110 06/21/14  0722 06/21/14  0722   AST 35 34  --   --  30   < >  --    ALT 21 22  --   --  18   < >  --    GGT  --   --   --   --   --   --  500*   ALKPHOS 189* 218*  --   --  166*   < >  --    BILITOTAL 1.1 0.7  --   --  0.6   < >  --    ELPIDIO  --  33 40 26  --    < >  --     < > = values in this interval not displayed.       Recent Labs   Lab Test 06/30/21  0732 06/28/21  0754 06/08/21  0824   MAG 1.9 1.9 1.7     Recent Labs   Lab Test 06/30/21  0732 06/29/21  0703 06/28/21  0754   PHOS 3.3 3.1 2.8     Recent Labs   Lab Test 06/30/21  0732 06/29/21  0703 06/28/21  0754   KEV 8.0* 8.4* 8.1*       Lab Results   Component Value Date    KEV 8.0 (L) 06/30/2021     Lab Results   Component Value Date    WBC 3.4 (L) 06/30/2021    HGB 7.5 (L) 06/30/2021    HCT 23.1 (L) 06/30/2021    MCV 94 06/30/2021     (L) 06/30/2021     Lab Results   Component Value Date     06/30/2021    POTASSIUM 4.3 06/30/2021    CHLORIDE 110 (H) 06/30/2021    CO2 22 06/30/2021    GLC 88 06/30/2021     Lab Results   Component Value Date    BUN 32 (H) 06/30/2021    CR 2.69 (H) 06/30/2021     Lab Results   Component Value Date    MAG 1.9 06/30/2021     Lab Results   Component Value Date    PHOS 3.3 06/30/2021       Creatinine   Date Value Ref Range Status   06/30/2021 2.69 (H) 0.66 - 1.25 mg/dL  Final   06/29/2021 2.71 (H) 0.66 - 1.25 mg/dL Final   06/28/2021 2.46 (H) 0.66 - 1.25 mg/dL Final   06/27/2021 2.29 (H) 0.66 - 1.25 mg/dL Final   06/25/2021 2.08 (H) 0.66 - 1.25 mg/dL Final   06/15/2021 2.29 (H) 0.66 - 1.25 mg/dL Final       Attestation:  I have reviewed today's vital signs, notes, medications, labs and imaging.     Arnaldo Frankel MD

## 2021-06-30 NOTE — PLAN OF CARE
A & O x4 but forgetful. VSS, no c/o pain.  CIWA 2, 2 for tremor and no prn needed. On 1500 mL fluid restriction. PIV infusing D5NS @ 100 mL/hr. Paracentesis sites-L & R w/bandaids CDI. Plan to discharge to TCU and SW following for discharge needs.

## 2021-06-30 NOTE — PROGRESS NOTES
[facility]    Hospitalist Progress Note    Brief Summary:   Jim Richard is a 66 year old male chronic alcoholism, cirrhosis and ascites.  He undergoes weekly paracenteses but missed his procedure yesterday because he was out drinking alcohol.  He presented to the emergency room today complaining of abdominal distention.  He was found to be intoxicated.  He is going to be admitted so he can undergo a therapeutic paracentesis.    Assessment & Plan      Massive Ascites recurrent   Cirrhosis, portal hypertension  Possible Hepatic encephalopathy   Patient missed his weekly paracentesis and presented with makedly distended abdomen, difficulty breathing and discomfort because of that. He is now S/p Therapeutic Paracentesis with about 5.9 liter out. Feeling better now.   He is not on diuretics as diuresis worsen his kidney functions. He is on weekly paracentesis.  S/p Paracentesis on 6/26 and now recurrent again, repeat Paracentesis on 6/29 with 5.2 liter fluid remove.  Fluid analysis negative for SBP at this time.   Patient has flapping tremors now, he is constipated and no BM for 3 days, likely developing early hepatic encephalopathy, start him on Lactulose 20 gm bid and like to have 2-3 BM daily, check ammonia level today      Alcohol dependence and intoxication on admission.  Now Alcohol withdrawal, improving.   Anxiety and depression   He was intoxicated in the emergency room, ethanol level 0.34.  He is on multiple psychiatric medications.  He is now having withdrawal symptoms, CIWA 8 this morning with mainly tremors and sub disorientation this morning. , keep him on alcohol withdrawal, avoid IV fluids at this time.     Continue prior to admission Lunesta, Neurontin, Remeron, Seroquel, Desyrel and Trintellix    Keep him on Thiamine, folic acid and multivitamin.     Check CIWA and give him lorazepam according to CIWA, low CIWA score now.   CD consulted, evaluation completed, recommend  residential, inpatient treatment program or TCU with CD services, agree with that, SW on case for disposition.   I think now his tremor most likely related to some encephalopathy rather than withdrawal, will check ammonia level and start on Lactulose.      Chronic obstructive lung disease     Continue prior to admission inhaled bronchodilators    Not in acute exacerbation at this time.      Coronary artery disease   Chronic hypotension  He is not on aspirin or a beta-blocker.    Continue midodrine     Obstructive sleep apnea     CPAP as at home     Acute on Chronic kidney disease  Lactic acidosis  Creatinine is at baseline (2.0-2.2).  Lactic acid is elevated due to his liver disease not sepsis.  Creatinine is now trending up and increase from 2.08 to 2.71 now, without any obvious reason,  He remain hemodynamically stable, His blood pressure remain stable, he is not on diuretics or any antibiotics at this time, no NSAID.  He just had paracentesis, need to rule out HRS, consult Nephrology to evaluate the patient, he is eating well   As well. He is on Midodrine, does not look dehydrated either.  Appreciate input from the Nephrology, FeNA low, started on IV fluids, creatinine improve from 2.71 to 2.69. Will defer further management to Nephrology.      Hypoglycemia due to liver dysfunction  This was treated in the emergency room.    Continue to monitor glucometers    No more hypoglycemia at this time.     Pancytopenia   Thrombocytopenia, leukopenia and anemia, this is likely secondary to alcohol abuse  Keep monitoring. Stable at this time.     Hyponatremia: now resolved.    start him on free water restriction of 1500 ml in 24 hrs.   Sodium now improve.     Deconditioning   This is multifactorial related to alcohol abuse, cirrhosis, recurrent ascites   PT and OT recommending TCU, I agree with that.  on board.     Umbilical Hernia  Abdominal pain ane LLQ/ Hip pain.   Complaints of abdominal pain, his abdomen  distended because of ascites, he does have  Lower abdomen tenderness as well, he has umbilical hernia which is slightly tender now.  Pain improve after paracentesis, CT abdomen pelvis negative.   He was evaluated by general surgery earlier this month for Umbilical hernia, no intervention  Recommended, agree with that, does not look strangulated at this time, no Bowel in the hernia  On CT abdomen     Start on Lactulose 20 gm bid  Check ammonia level.   Avoid lorazepam now  PT and OT to continue  SW for safe disposition, likely to treatment facility     DVT Prophylaxis: Pneumatic Compression Devices  Code Status: No CPR- Do NOT Intubate    Disposition: Expected discharge likely 2 or more days before get stable.     Dhaval Snyder MD  Text Page  (7am - 6pm)    Interval History   Patient complaints of tremors when using and mostly positional, denies any nausea, vomiting, pain to LLQ is better now but still there, has no BM for last 3 days. No fever, chills, headache or dizziness.     No other significant overnight event.     -Data reviewed today: I reviewed all new labs and imaging results over the last 24 hours. I personally reviewed no images or EKG's today.    Physical Exam   Temp: 97.5  F (36.4  C) Temp src: Oral BP: 131/72 Pulse: 80   Resp: 16 SpO2: 97 % O2 Device: None (Room air)    Vitals:    06/25/21 1920 06/25/21 2330 06/30/21 0616   Weight: 86.2 kg (190 lb) 80.5 kg (177 lb 6.4 oz) 77.4 kg (170 lb 9.6 oz)     Vital Signs with Ranges  Temp:  [97.1  F (36.2  C)-97.7  F (36.5  C)] 97.5  F (36.4  C)  Pulse:  [73-87] 80  Resp:  [16-18] 16  BP: (104-138)/(55-81) 131/72  SpO2:  [96 %-98 %] 97 %  I/O last 3 completed shifts:  In: 1212 [P.O.:240; I.V.:972]  Out: 1775 [Urine:1775]    Constitutional: awake, alert, cooperative, no apparent distress, and appears stated age  Eyes: Lids and lashes normal, pupils equal, round and reactive to light, extra ocular muscles intact, sclera clear, conjunctiva normal  Respiratory: No  increased work of breathing, decrease air entry bilaterally, no crackles or wheezing  Cardiovascular: Normal apical impulse, regular rate and rhythm, normal S1 and S2, no S3 or S4, and no murmur noted  GI: No scars, normal bowel sounds, soft,  Distended because of ascites now improve today, umbilical hernia present and slightly tender, , no masses palpated, no hepatosplenomegally  Musculoskeletal: 1+ lower extremity pitting edema present  Neurologic: no focal deficit. Has no resting tremors, flapping tremor positive mildly.     Medications     dextrose 5% and 0.9% NaCl 100 mL/hr at 06/30/21 1030     - MEDICATION INSTRUCTIONS -         albumin human  12.5 g Intravenous Q6H     finasteride  5 mg Oral Daily     folic acid  1 mg Oral Daily     gabapentin  300 mg Oral TID     iron sucrose (VENOFER) intermittent infusion  200 mg Intravenous Daily     lactulose  20 g Oral BID     magnesium oxide  400 mg Oral Daily     miconazole   Topical BID     midodrine  10 mg Oral TID w/meals     mirtazapine  30 mg Oral At Bedtime     multivitamin w/minerals  1 tablet Oral Daily     nicotine  1 patch Transdermal Q24H     nicotine   Transdermal Q8H     pantoprazole  40 mg Oral BID     QUEtiapine  200 mg Oral At Bedtime     sodium chloride (PF)  3 mL Intracatheter Q8H     tamsulosin  0.8 mg Oral Daily     thiamine  100 mg Oral TID    Followed by     [START ON 7/3/2021] thiamine  100 mg Oral Daily     traZODone  100 mg Oral At Bedtime     vortioxetine  10 mg Oral BID       Data   Recent Labs   Lab 06/30/21  0732 06/29/21  0703 06/28/21  0754 06/27/21  0803 06/25/21  1931   WBC 3.4* 3.8* 3.6* 3.9* 7.6   HGB 7.5* 7.7* 7.5* 7.5* 8.4*   MCV 94 93 96 94 96   * 114* 125* 120* 231   INR  --   --   --   --  1.24*    133 131* 131* 142   POTASSIUM 4.3 4.9 4.4 4.6 4.1   CHLORIDE 110* 105 102 104 114*   CO2 22 23 22 21 16*   BUN 32* 33* 32* 32* 32*   CR 2.69* 2.71* 2.46* 2.29* 2.08*   ANIONGAP 6 5 7 6 12   KEV 8.0* 8.4* 8.1* 8.1* 8.0*    GLC 88 104* 98 122* 63*   ALBUMIN 2.7* 2.4* 2.4* 2.5* 3.0*   PROTTOTAL  --   --   --  6.1* 7.1   BILITOTAL  --   --   --  1.1 0.7   ALKPHOS  --   --   --  189* 218*   ALT  --   --   --  21 22   AST  --   --   --  35 34       Recent Results (from the past 24 hour(s))   US Paracentesis    Narrative    US PARACENTESIS 6/29/2021 2:20 PM    CLINICAL HISTORY: LOW VOLUME paracentesis for diagnostic fluid  analysis only with labs to be drawn as ordered    PROCEDURE: Informed consent obtained. Time out performed. The abdomen  was prepped and draped in a sterile fashion. 10 mL of 1% lidocaine was  infused into local soft tissues. An 8 Romanian catheter system was  introduced into the abdominal ascites under ultrasound guidance.    5.2 liters of clear fluid were removed and sent to lab if requested.    Patient tolerated procedure well.    Ultrasound imaging was obtained and placed in the patient's permanent  medical record.      Impression    IMPRESSION:  1.  Status post ultrasound-guided paracentesis.    MACKENZIE DYE MD   CT Abdomen Pelvis w/o Contrast    Narrative    CT ABDOMEN PELVIS W/O CONTRAST 6/29/2021 2:34 PM    CLINICAL HISTORY: LLQ abdominal pain  TECHNIQUE: CT scan of the abdomen and pelvis was performed without IV  contrast. Multiplanar reformats were obtained. Dose reduction  techniques were used.  CONTRAST: None.    COMPARISON: 10/6/2020    FINDINGS:   LOWER CHEST: Mild left basilar dependent atelectasis. Large  paraesophageal varices.    HEPATOBILIARY: Cirrhosis. Evaluation for masses limited on noncontrast  CT. No calcified gallstones or biliary ductal dilatation.    PANCREAS: Normal.    SPLEEN: Splenomegaly.    ADRENAL GLANDS: Normal.    KIDNEYS/BLADDER: No hydronephrosis or significant calculi. Left upper  pole simple cyst requiring no specific follow-up. The urinary bladder  is decompressed with a Leger catheter.    BOWEL: No obstruction or inflammatory change.    LYMPH NODES: No definite lymphadenopathy on  this noncontrast CT.    VASCULATURE: No abdominal aortic aneurysm.    PELVIC ORGANS: No pelvic masses.    OTHER: Aortobiiliac atherosclerotic calcifications. Trace residual  ascites with fluid in the umbilicus. No fluid collections.    MUSCULOSKELETAL: Bilateral rib fractures with callus formation.  Degenerative changes in the spine and instrumented fusion in the  lumbar spine. No suspicious lesions in the bones.      Impression    IMPRESSION:   1.  No acute findings in the abdomen and pelvis on this noncontrast  CT.  2.  Cirrhosis and evidence of portal hypertension.    MACKENZIE DYE MD

## 2021-06-30 NOTE — PLAN OF CARE
[id number] shift. Vitals stable. Disorientated to time, forgetful. CIWA 2/8, mostly for tremors- Seroquel and Ativan given x1. Up w/ 1 assist walker/gb, up to chair for awhile. Cardiac diet w/ 1500cc fluid restriction. Leger patent w/ adequate urine out put. Started lactulose today- large formed BM x1. Continuing albumin/iron infusions, nephrology following. Will need TCU w/ CD at discharge.

## 2021-07-01 ENCOUNTER — APPOINTMENT (OUTPATIENT)
Dept: PHYSICAL THERAPY | Facility: CLINIC | Age: 66
DRG: 432 | End: 2021-07-01
Payer: MEDICARE

## 2021-07-01 LAB
ALBUMIN SERPL-MCNC: 3.1 G/DL (ref 3.4–5)
ANION GAP SERPL CALCULATED.3IONS-SCNC: 4 MMOL/L (ref 3–14)
BASOPHILS # BLD AUTO: 0 10E9/L (ref 0–0.2)
BASOPHILS # BLD AUTO: 0 10E9/L (ref 0–0.2)
BASOPHILS NFR BLD AUTO: 0.6 %
BASOPHILS NFR BLD AUTO: 0.8 %
BUN SERPL-MCNC: 31 MG/DL (ref 7–30)
CALCIUM SERPL-MCNC: 8.3 MG/DL (ref 8.5–10.1)
CHLORIDE SERPL-SCNC: 111 MMOL/L (ref 94–109)
CO2 SERPL-SCNC: 25 MMOL/L (ref 20–32)
COPATH REPORT: NORMAL
CREAT SERPL-MCNC: 2.67 MG/DL (ref 0.66–1.25)
DIFFERENTIAL METHOD BLD: ABNORMAL
DIFFERENTIAL METHOD BLD: ABNORMAL
EOSINOPHIL # BLD AUTO: 0.1 10E9/L (ref 0–0.7)
EOSINOPHIL # BLD AUTO: 0.1 10E9/L (ref 0–0.7)
EOSINOPHIL NFR BLD AUTO: 2.1 %
EOSINOPHIL NFR BLD AUTO: 3.1 %
ERYTHROCYTE [DISTWIDTH] IN BLOOD BY AUTOMATED COUNT: 18.7 % (ref 10–15)
ERYTHROCYTE [DISTWIDTH] IN BLOOD BY AUTOMATED COUNT: 19.1 % (ref 10–15)
GFR SERPL CREATININE-BSD FRML MDRD: 24 ML/MIN/{1.73_M2}
GLUCOSE BLDC GLUCOMTR-MCNC: 94 MG/DL (ref 70–99)
GLUCOSE SERPL-MCNC: 93 MG/DL (ref 70–99)
HCT VFR BLD AUTO: 22.6 % (ref 40–53)
HCT VFR BLD AUTO: 24.3 % (ref 40–53)
HGB BLD-MCNC: 7.3 G/DL (ref 13.3–17.7)
HGB BLD-MCNC: 7.8 G/DL (ref 13.3–17.7)
IMM GRANULOCYTES # BLD: 0 10E9/L (ref 0–0.4)
IMM GRANULOCYTES # BLD: 0 10E9/L (ref 0–0.4)
IMM GRANULOCYTES NFR BLD: 0.3 %
IMM GRANULOCYTES NFR BLD: 0.3 %
LYMPHOCYTES # BLD AUTO: 0.3 10E9/L (ref 0.8–5.3)
LYMPHOCYTES # BLD AUTO: 0.3 10E9/L (ref 0.8–5.3)
LYMPHOCYTES NFR BLD AUTO: 8.8 %
LYMPHOCYTES NFR BLD AUTO: 8.8 %
MCH RBC QN AUTO: 30.2 PG (ref 26.5–33)
MCH RBC QN AUTO: 30.7 PG (ref 26.5–33)
MCHC RBC AUTO-ENTMCNC: 32.1 G/DL (ref 31.5–36.5)
MCHC RBC AUTO-ENTMCNC: 32.3 G/DL (ref 31.5–36.5)
MCV RBC AUTO: 94 FL (ref 78–100)
MCV RBC AUTO: 95 FL (ref 78–100)
MONOCYTES # BLD AUTO: 0.4 10E9/L (ref 0–1.3)
MONOCYTES # BLD AUTO: 0.5 10E9/L (ref 0–1.3)
MONOCYTES NFR BLD AUTO: 11.5 %
MONOCYTES NFR BLD AUTO: 12.9 %
NEUTROPHILS # BLD AUTO: 2.5 10E9/L (ref 1.6–8.3)
NEUTROPHILS # BLD AUTO: 2.9 10E9/L (ref 1.6–8.3)
NEUTROPHILS NFR BLD AUTO: 74.1 %
NEUTROPHILS NFR BLD AUTO: 76.7 %
NRBC # BLD AUTO: 0 10*3/UL
NRBC # BLD AUTO: 0 10*3/UL
NRBC BLD AUTO-RTO: 0 /100
NRBC BLD AUTO-RTO: 0 /100
PHOSPHATE SERPL-MCNC: 3.4 MG/DL (ref 2.5–4.5)
PLATELET # BLD AUTO: 112 10E9/L (ref 150–450)
PLATELET # BLD AUTO: 114 10E9/L (ref 150–450)
POTASSIUM SERPL-SCNC: 4.3 MMOL/L (ref 3.4–5.3)
RBC # BLD AUTO: 2.38 10E12/L (ref 4.4–5.9)
RBC # BLD AUTO: 2.58 10E12/L (ref 4.4–5.9)
RETICS # AUTO: 53.9 10E9/L (ref 25–95)
RETICS/RBC NFR AUTO: 2.1 % (ref 0.5–2)
SODIUM SERPL-SCNC: 140 MMOL/L (ref 133–144)
WBC # BLD AUTO: 3.3 10E9/L (ref 4–11)
WBC # BLD AUTO: 3.9 10E9/L (ref 4–11)

## 2021-07-01 PROCEDURE — 99233 SBSQ HOSP IP/OBS HIGH 50: CPT | Performed by: HOSPITALIST

## 2021-07-01 PROCEDURE — P9041 ALBUMIN (HUMAN),5%, 50ML: HCPCS | Performed by: INTERNAL MEDICINE

## 2021-07-01 PROCEDURE — 258N000003 HC RX IP 258 OP 636: Performed by: INTERNAL MEDICINE

## 2021-07-01 PROCEDURE — 99232 SBSQ HOSP IP/OBS MODERATE 35: CPT | Performed by: INTERNAL MEDICINE

## 2021-07-01 PROCEDURE — 85025 COMPLETE CBC W/AUTO DIFF WBC: CPT | Performed by: HOSPITALIST

## 2021-07-01 PROCEDURE — 36415 COLL VENOUS BLD VENIPUNCTURE: CPT | Performed by: HOSPITALIST

## 2021-07-01 PROCEDURE — 250N000013 HC RX MED GY IP 250 OP 250 PS 637: Performed by: INTERNAL MEDICINE

## 2021-07-01 PROCEDURE — 250N000013 HC RX MED GY IP 250 OP 250 PS 637: Performed by: HOSPITALIST

## 2021-07-01 PROCEDURE — 120N000001 HC R&B MED SURG/OB

## 2021-07-01 PROCEDURE — 999N001017 HC STATISTIC GLUCOSE BY METER IP

## 2021-07-01 PROCEDURE — 97530 THERAPEUTIC ACTIVITIES: CPT | Mod: GP | Performed by: PHYSICAL THERAPIST

## 2021-07-01 PROCEDURE — 85025 COMPLETE CBC W/AUTO DIFF WBC: CPT | Performed by: INTERNAL MEDICINE

## 2021-07-01 PROCEDURE — 97110 THERAPEUTIC EXERCISES: CPT | Mod: GP | Performed by: PHYSICAL THERAPIST

## 2021-07-01 PROCEDURE — 80069 RENAL FUNCTION PANEL: CPT | Performed by: INTERNAL MEDICINE

## 2021-07-01 PROCEDURE — 36415 COLL VENOUS BLD VENIPUNCTURE: CPT | Performed by: INTERNAL MEDICINE

## 2021-07-01 PROCEDURE — 85045 AUTOMATED RETICULOCYTE COUNT: CPT | Performed by: HOSPITALIST

## 2021-07-01 PROCEDURE — 250N000011 HC RX IP 250 OP 636: Performed by: INTERNAL MEDICINE

## 2021-07-01 RX ADMIN — TAMSULOSIN HYDROCHLORIDE 0.8 MG: 0.4 CAPSULE ORAL at 08:50

## 2021-07-01 RX ADMIN — GABAPENTIN 300 MG: 300 CAPSULE ORAL at 08:50

## 2021-07-01 RX ADMIN — LACTULOSE 20 G: 10 SOLUTION ORAL at 08:49

## 2021-07-01 RX ADMIN — MULTIPLE VITAMINS W/ MINERALS TAB 1 TABLET: TAB at 08:50

## 2021-07-01 RX ADMIN — MIDODRINE HYDROCHLORIDE 10 MG: 5 TABLET ORAL at 17:28

## 2021-07-01 RX ADMIN — IRON SUCROSE 200 MG: 20 INJECTION, SOLUTION INTRAVENOUS at 09:04

## 2021-07-01 RX ADMIN — QUETIAPINE FUMARATE 50 MG: 25 TABLET ORAL at 18:03

## 2021-07-01 RX ADMIN — QUETIAPINE FUMARATE 50 MG: 25 TABLET ORAL at 04:47

## 2021-07-01 RX ADMIN — ALBUMIN HUMAN 12.5 G: 0.05 INJECTION, SOLUTION INTRAVENOUS at 06:33

## 2021-07-01 RX ADMIN — LACTULOSE 20 G: 10 SOLUTION ORAL at 22:06

## 2021-07-01 RX ADMIN — FOLIC ACID 1 MG: 1 TABLET ORAL at 08:50

## 2021-07-01 RX ADMIN — THIAMINE HCL TAB 100 MG 100 MG: 100 TAB at 22:07

## 2021-07-01 RX ADMIN — THIAMINE HCL TAB 100 MG 100 MG: 100 TAB at 15:26

## 2021-07-01 RX ADMIN — MIDODRINE HYDROCHLORIDE 10 MG: 5 TABLET ORAL at 08:50

## 2021-07-01 RX ADMIN — ALBUMIN HUMAN 12.5 G: 0.05 INJECTION, SOLUTION INTRAVENOUS at 00:53

## 2021-07-01 RX ADMIN — THIAMINE HCL TAB 100 MG 100 MG: 100 TAB at 08:51

## 2021-07-01 RX ADMIN — PANTOPRAZOLE SODIUM 40 MG: 40 TABLET, DELAYED RELEASE ORAL at 08:50

## 2021-07-01 RX ADMIN — MICONAZOLE NITRATE: 20 POWDER TOPICAL at 08:51

## 2021-07-01 RX ADMIN — PANTOPRAZOLE SODIUM 40 MG: 40 TABLET, DELAYED RELEASE ORAL at 22:07

## 2021-07-01 RX ADMIN — NICOTINE 1 PATCH: 21 PATCH, EXTENDED RELEASE TRANSDERMAL at 22:06

## 2021-07-01 RX ADMIN — QUETIAPINE 200 MG: 200 TABLET, FILM COATED ORAL at 22:07

## 2021-07-01 RX ADMIN — MIDODRINE HYDROCHLORIDE 10 MG: 5 TABLET ORAL at 13:06

## 2021-07-01 RX ADMIN — TRAZODONE HYDROCHLORIDE 100 MG: 100 TABLET ORAL at 22:06

## 2021-07-01 RX ADMIN — MICONAZOLE NITRATE: 20 POWDER TOPICAL at 22:07

## 2021-07-01 RX ADMIN — MAGNESIUM OXIDE TAB 400 MG (241.3 MG ELEMENTAL MG) 400 MG: 400 (241.3 MG) TAB at 08:50

## 2021-07-01 RX ADMIN — MIRTAZAPINE 30 MG: 15 TABLET, FILM COATED ORAL at 22:06

## 2021-07-01 RX ADMIN — VORTIOXETINE 10 MG: 10 TABLET, FILM COATED ORAL at 08:51

## 2021-07-01 RX ADMIN — GABAPENTIN 300 MG: 300 CAPSULE ORAL at 15:26

## 2021-07-01 RX ADMIN — GABAPENTIN 300 MG: 300 CAPSULE ORAL at 22:07

## 2021-07-01 RX ADMIN — FINASTERIDE 5 MG: 5 TABLET, FILM COATED ORAL at 08:50

## 2021-07-01 RX ADMIN — VORTIOXETINE 10 MG: 10 TABLET, FILM COATED ORAL at 22:06

## 2021-07-01 ASSESSMENT — ACTIVITIES OF DAILY LIVING (ADL)
ADLS_ACUITY_SCORE: 14
ADLS_ACUITY_SCORE: 16

## 2021-07-01 ASSESSMENT — MIFFLIN-ST. JEOR: SCORE: 1500.21

## 2021-07-01 NOTE — PROGRESS NOTES
Assessment and Plan:   NGUYEN: HRS. Cr slightly improved last 3 days. Lytes nominal. Alb 3.1.   UO uest 1600 ml.     Date/Time Weight [facility]   07/01/21 0529 76.7 kg (169 lb)    06/30/21 0616 77.4 kg (170 lb 9.6 oz)    06/25/21 2330 80.5 kg (177 lb 6.4 oz)    06/25/21 1920 86.2 kg (190 lb)    Wt down 10 kg since adm. Hemodynamically stable.     S/P IVF and IV alb. On midodrine. Getting Mg replacement.     Repeat labs in am.               Interval History:   Alcoholism   Liver failure with cirrhosis and ascites. S/P paracentesis.   Anemia:  Fe def.  On venofer.             Review of Systems:   C/O weak and shaky. No abd pain but poor appetite. No SOB, he is not ambulating.          Medications:       finasteride  5 mg Oral Daily     folic acid  1 mg Oral Daily     gabapentin  300 mg Oral TID     iron sucrose (VENOFER) intermittent infusion  200 mg Intravenous Daily     lactulose  20 g Oral BID     magnesium oxide  400 mg Oral Daily     miconazole   Topical BID     midodrine  10 mg Oral TID w/meals     mirtazapine  30 mg Oral At Bedtime     multivitamin w/minerals  1 tablet Oral Daily     nicotine  1 patch Transdermal Q24H     nicotine   Transdermal Q8H     pantoprazole  40 mg Oral BID     QUEtiapine  200 mg Oral At Bedtime     sodium chloride (PF)  3 mL Intracatheter Q8H     tamsulosin  0.8 mg Oral Daily     thiamine  100 mg Oral TID    Followed by     [START ON 7/3/2021] thiamine  100 mg Oral Daily     traZODone  100 mg Oral At Bedtime     vortioxetine  10 mg Oral BID       - MEDICATION INSTRUCTIONS -       Current active medications and PTA medications reviewed, see medication list for details.            Physical Exam:   Vitals were reviewed  Patient Vitals for the past 24 hrs:   BP Temp Temp src Pulse Resp SpO2 Weight   07/01/21 1311 (!) 148/75 97.5  F (36.4  C) Oral 72 16 96 % --   07/01/21 1023 (!) 140/69 98.4  F (36.9  C) -- 64 16 -- --   07/01/21 0923 137/69 -- -- 65 -- -- --   07/01/21 0908 137/75 --  -- 71 -- -- --   21 0817 (!) 149/80 98.6  F (37  C) Oral 77 16 96 % --   21 0529 -- -- -- -- -- -- 76.7 kg (169 lb)   21 2327 (!) 151/77 97.7  F (36.5  C) Oral 70 16 93 % --   21 1528 139/73 97.9  F (36.6  C) Oral 69 16 96 % --       Temp:  [97.5  F (36.4  C)-98.6  F (37  C)] 97.5  F (36.4  C)  Pulse:  [64-77] 72  Resp:  [16] 16  BP: (137-151)/(69-80) 148/75  SpO2:  [93 %-96 %] 96 %    Temperatures:  Current - Temp: 97.5  F (36.4  C); Max - Temp  Av  F (36.7  C)  Min: 97.5  F (36.4  C)  Max: 98.6  F (37  C)  Respiration range: Resp  Av  Min: 16  Max: 16  Pulse range: Pulse  Av.7  Min: 64  Max: 77  Blood pressure range: Systolic (24hrs), Av , Min:137 , Max:151   ; Diastolic (24hrs), Av, Min:69, Max:80    Pulse oximetry range: SpO2  Av.3 %  Min: 93 %  Max: 96 %    I/O last 3 completed shifts:  In: 1992.33 [P.O.:1548; I.V.:194.33]  Out: 1050 [Urine:1050]      Intake/Output Summary (Last 24 hours) at 2021 1437  Last data filed at 2021 0633  Gross per 24 hour   Intake 1084 ml   Output 1050 ml   Net 34 ml       Alert, oriented  Lungs with clear BS  Cor RRR nl S1 S2 no rub, no JVD  LE 1+ edema L no edema R  Abd marked distention, umbilical hernia, + fluid wave.   jiang       Wt Readings from Last 4 Encounters:   21 76.7 kg (169 lb)   21 81.1 kg (178 lb 11.2 oz)   21 86.2 kg (190 lb)   21 86.2 kg (190 lb)          Data:          Lab Results   Component Value Date     2021     2021     2021    Lab Results   Component Value Date    CHLORIDE 111 2021    CHLORIDE 110 2021    CHLORIDE 105 2021    Lab Results   Component Value Date    BUN 31 2021    BUN 32 2021    BUN 33 2021      Lab Results   Component Value Date    POTASSIUM 4.3 2021    POTASSIUM 4.3 2021    POTASSIUM 4.9 2021    Lab Results   Component Value Date    CO2 25 2021    CO2 22 2021     CO2 23 06/29/2021    Lab Results   Component Value Date    CR 2.67 07/01/2021    CR 2.69 06/30/2021    CR 2.71 06/29/2021        Recent Labs   Lab Test 07/01/21  0730 06/30/21  0732 06/29/21  0703   WBC 3.3* 3.4* 3.8*   HGB 7.3* 7.5* 7.7*   HCT 22.6* 23.1* 23.7*   MCV 95 94 93   * 106* 114*     Recent Labs   Lab Test 06/30/21  1140 06/27/21  0803 06/25/21  1931 06/16/21  1205 06/11/21  1110 06/11/21  1110 06/21/14  0722 06/21/14 0722   AST  --  35 34  --   --  30   < >  --    ALT  --  21 22  --   --  18   < >  --    GGT  --   --   --   --   --   --   --  500*   ALKPHOS  --  189* 218*  --   --  166*   < >  --    BILITOTAL  --  1.1 0.7  --   --  0.6   < >  --    ELPIDIO 66*  --  33 40   < >  --    < >  --     < > = values in this interval not displayed.       Recent Labs   Lab Test 06/30/21  0732 06/28/21  0754 06/08/21  0824   MAG 1.9 1.9 1.7     Recent Labs   Lab Test 07/01/21  0730 06/30/21  0732 06/29/21  0703   PHOS 3.4 3.3 3.1     Recent Labs   Lab Test 07/01/21  0730 06/30/21  0732 06/29/21  0703   KEV 8.3* 8.0* 8.4*       Lab Results   Component Value Date    KEV 8.3 (L) 07/01/2021     Lab Results   Component Value Date    WBC 3.3 (L) 07/01/2021    HGB 7.3 (L) 07/01/2021    HCT 22.6 (L) 07/01/2021    MCV 95 07/01/2021     (L) 07/01/2021     Lab Results   Component Value Date     07/01/2021    POTASSIUM 4.3 07/01/2021    CHLORIDE 111 (H) 07/01/2021    CO2 25 07/01/2021    GLC 93 07/01/2021     Lab Results   Component Value Date    BUN 31 (H) 07/01/2021    CR 2.67 (H) 07/01/2021     Lab Results   Component Value Date    MAG 1.9 06/30/2021     Lab Results   Component Value Date    PHOS 3.4 07/01/2021       Creatinine   Date Value Ref Range Status   07/01/2021 2.67 (H) 0.66 - 1.25 mg/dL Final   06/30/2021 2.69 (H) 0.66 - 1.25 mg/dL Final   06/29/2021 2.71 (H) 0.66 - 1.25 mg/dL Final   06/28/2021 2.46 (H) 0.66 - 1.25 mg/dL Final   06/27/2021 2.29 (H) 0.66 - 1.25 mg/dL Final   06/25/2021 2.08  (H) 0.66 - 1.25 mg/dL Final       Attestation:  I have reviewed today's vital signs, notes, medications, labs and imaging.     Arnaldo Frankel MD

## 2021-07-01 NOTE — PLAN OF CARE
Pt c/o sig tremors and twitching, MD aware. VSS except htn, not w/in parameters for prn. CIWA for tremors only, no PRNs. On 1500 mL fluid restriction. Leger patent w/god UOP. Paracentesis sites w/bandaids x 2, CDI but L side bruised and abdomen distended and taut. PIV now SL but getting intermittent albumin and IV iron. Psych consult.  Lactose given, large soft bm x 1. Will need TCU w/CD services @ discharge when medically stable. Pt is already on a commitment, see notes.

## 2021-07-01 NOTE — PROGRESS NOTES
[facility]  Hospitalist Progress Note        Piter Alis Hua, DO  07/01/2021        Interval History:      Patient states that he is very weak, discussed plan of care, verbalized understanding.          Assessment and Plan:        Jim Richard is a 66 year old male chronic alcoholism, cirrhosis and ascites admitted for alcohol use and abdominal distention.      Massive Ascites recurrent   Cirrhosis, portal hypertension  Possible Hepatic encephalopathy   Patient missed his weekly paracentesis and presented with makedly distended abdomen, difficulty breathing and discomfort because of that. He is now S/p Therapeutic Paracentesis with about 5.9 liter out.   - Monitor.   - He is not on diuretics as diuresis worsen his kidney functions. He is on weekly paracentesis.   - S/p Paracentesis on 6/26 and now recurrent again, repeat Paracentesis on 6/29 with 5.2 liter fluid remove.  - Fluid analysis negative for SBP at this time.   - Lactulose 20 gm bid and like to have 2-3 BM daily,   - ammonia level   - Psych consulted by request of CM for evaluation of prior commitment process and directions.       Alcohol dependence and intoxication on admission.  Now Alcohol withdrawal, improving.   Anxiety and depression   ethanol level 0.34.  He is on multiple psychiatric medications.  - Continue prior to admission Lunesta, Neurontin, Remeron, Seroquel, Desyrel and Trintellix  - Thiamine, folic acid and multivitamin.   - CIWA   - CD consulted, evaluation completed, recommend residential, inpatient treatment program or TCU with CD services     Chronic obstructive lung disease Continue prior to admission inhaled bronchodilators  - Not in acute exacerbation at this time.      Coronary artery disease   Chronic hypotension  He is not on aspirin or a beta-blocker.  - Continue midodrine     Obstructive sleep apnea   - CPAP as at home     Acute on Chronic kidney disease  Lactic acidosis  Creatinine baseline (2.0-2.2).  " Lactic acid elevated due to his liver disease not sepsis.  - Monitor kidney function.   - Consulted Nephrology, defer to their service.      Hypoglycemia due to liver dysfunction This was treated in the emergency room.  - Continue to monitor      Pancytopenia Thrombocytopenia, leukopenia and anemia, this is likely secondary to alcohol abuse  - Monitor.      Hyponatremia  - free water restriction of 1500 ml      Deconditioning This is multifactorial related to alcohol abuse, cirrhosis, recurrent ascites   PT and OT recommending TCU     Umbilical Hernia  Abdominal pain ane LLQ, Hip pain.   Complaints of abdominal pain, his abdomen distended because of ascites. Pain improve after paracentesis, CT abdomen pelvis negative.   He was evaluated by general surgery earlier this month for Umbilical hernia, no intervention  - Monitor.      DVT Prophylaxis: Pneumatic Compression Devices    Code Status: No CPR- Do NOT Intubate     Disposition: TCU per PT, discussed with CM, need psych input, likely near discharge date, 1-2 days.     Diet: Orders Placed This Encounter      Low Saturated Fat Na <2400 mg         Physical Exam:           Blood pressure (!) 151/77, pulse 70, temperature 97.7  F (36.5  C), temperature source Oral, resp. rate 16, height 1.702 m (5' 7\"), weight 76.7 kg (169 lb), SpO2 93 %.    Vitals:    21 2330 21 0616 21 0529   Weight: 80.5 kg (177 lb 6.4 oz) 77.4 kg (170 lb 9.6 oz) 76.7 kg (169 lb)       Vital Sign Ranges  Temperature Temp  Av.7  F (36.5  C)  Min: 97.5  F (36.4  C)  Max: 97.9  F (36.6  C)   Blood pressure Systolic (24hrs), Av , Min:131 , Max:151        Diastolic (24hrs), Av, Min:72, Max:77      Pulse Pulse  Av  Min: 69  Max: 80   Respirations Resp  Av  Min: 16  Max: 16   Pulse oximetry SpO2  Av.3 %  Min: 93 %  Max: 97 %     Vital Signs with Ranges  Temp:  [97.5  F (36.4  C)-97.9  F (36.6  C)] 97.7  F (36.5  C)  Pulse:  [69-80] 70  Resp:  [16] 16  BP: " (131-151)/(72-77) 151/77  SpO2:  [93 %-97 %] 93 %    I/O Last 3 Shifts:   I/O last 3 completed shifts:  In: 1992.33 [P.O.:1548; I.V.:194.33]  Out: 1050 [Urine:1050]    I/O past 24 hours:     Intake/Output Summary (Last 24 hours) at 7/1/2021 0747  Last data filed at 7/1/2021 0633  Gross per 24 hour   Intake 1992.33 ml   Output 1050 ml   Net 942.33 ml     GENERAL: Alert and oriented. NAD. Conversational, appropriate. Weak, shaky.   HEENT: Normocephalic. EOMI. No icterus or injection. Nares normal.   LUNGS: Clear to auscultation. No dyspnea at rest.   HEART: Regular rate. Extremities perfused.   ABDOMEN: Soft, minimally tender, and distended. Positive bowel sounds. Umbilical hernia, non-strangulated.   EXTREMITIES: LE edema noted.   NEUROLOGIC: Moves extremities x4, follows commands. Weakness and tremulousness.          Prior to Admission Medications:        Medications Prior to Admission   Medication Sig Dispense Refill Last Dose     albuterol (PROAIR HFA/PROVENTIL HFA/VENTOLIN HFA) 108 (90 Base) MCG/ACT inhaler Inhale 1-2 puffs into the lungs every 4 hours as needed for shortness of breath / dyspnea or wheezing   prn med     eszopiclone (LUNESTA) 3 MG tablet Take 3 mg by mouth nightly as needed for sleep   prn med     fluticasone-vilanterol (BREO ELLIPTA) 200-25 MCG/INH inhaler Inhale 1 puff into the lungs daily as needed (SOB) 28 each 0 prn med     folic acid (FOLVITE) 1 MG tablet Take 1 mg by mouth daily   Past Week at Unknown time     gabapentin (NEURONTIN) 300 MG capsule Take 300 mg by mouth 3 times daily   Past Week at Unknown time     magnesium oxide (MAG-OX) 400 (240 Mg) MG tablet Take 400 mg by mouth daily   Past Week at Unknown time     midodrine (PROAMATINE) 2.5 MG tablet Take 2.5 mg by mouth 3 times daily   Past Week at Unknown time     mirtazapine (REMERON) 30 MG tablet Take 30 mg by mouth At Bedtime Filled 1/11/21 for #30   Past Week at Unknown time     multivitamin w/minerals (THERA-VIT-M) tablet Take 1  tablet by mouth daily (Patient taking differently: Take 1 tablet by mouth daily Filled 9/21/21 #30) 30 tablet 0 Past Week at Unknown time     pantoprazole (PROTONIX) 40 MG EC tablet Take 1 tablet (40 mg) by mouth 2 times daily 60 tablet 0      QUEtiapine (SEROQUEL) 200 MG tablet Take 200 mg by mouth At Bedtime Filled 1/11/21 #30         QUEtiapine (SEROQUEL) 50 MG tablet Take 1 tablet (50 mg) by mouth 3 times daily as needed (anxiety) Filled 11/20/20 #30 90 tablet 0 prn med     tamsulosin (FLOMAX) 0.4 MG capsule Take 2 capsules (0.8 mg) by mouth daily 30 capsule 0 Past Week at Unknown time     traZODone (DESYREL) 100 MG tablet Take 100 mg by mouth At Bedtime Filled 1/11/21 #30   Past Week at Unknown time     vortioxetine (TRINTELLIX) 10 MG tablet Take 10 mg by mouth 2 times daily   Past Month at Unknown time     nicotine (NICODERM CQ) 21 MG/24HR 24 hr patch Place 1 patch onto the skin every 24 hours               Medications:        Current Facility-Administered Medications   Medication Last Rate     - MEDICATION INSTRUCTIONS -       Current Facility-Administered Medications   Medication Dose Route Frequency     finasteride  5 mg Oral Daily     folic acid  1 mg Oral Daily     gabapentin  300 mg Oral TID     iron sucrose (VENOFER) intermittent infusion  200 mg Intravenous Daily     lactulose  20 g Oral BID     magnesium oxide  400 mg Oral Daily     miconazole   Topical BID     midodrine  10 mg Oral TID w/meals     mirtazapine  30 mg Oral At Bedtime     multivitamin w/minerals  1 tablet Oral Daily     nicotine  1 patch Transdermal Q24H     nicotine   Transdermal Q8H     pantoprazole  40 mg Oral BID     QUEtiapine  200 mg Oral At Bedtime     sodium chloride (PF)  3 mL Intracatheter Q8H     tamsulosin  0.8 mg Oral Daily     thiamine  100 mg Oral TID    Followed by     [START ON 7/3/2021] thiamine  100 mg Oral Daily     traZODone  100 mg Oral At Bedtime     vortioxetine  10 mg Oral BID     Current Facility-Administered  Medications   Medication Dose Route Frequency     albuterol  1-2 puff Inhalation Q4H PRN     eszopiclone  2 mg Oral At Bedtime PRN     flumazenil  0.2 mg Intravenous q1 min prn     fluticasone-vilanterol  1 puff Inhalation Daily PRN     OLANZapine zydis  5-10 mg Oral Q6H PRN    Or     haloperidol lactate  2.5-5 mg Intravenous Q6H PRN     lidocaine 4%   Topical Q1H PRN     lidocaine   Urethral Q4H PRN     lidocaine (buffered or not buffered)  0.1-1 mL Other Q1H PRN     LORazepam  1-2 mg Oral Q30 Min PRN    Or     LORazepam  1-2 mg Intravenous Q30 Min PRN     - MEDICATION INSTRUCTIONS -   Does not apply Continuous PRN     melatonin  5 mg Oral QPM PRN     naloxone  0.2 mg Intravenous Q2 Min PRN    Or     naloxone  0.4 mg Intravenous Q2 Min PRN    Or     naloxone  0.2 mg Intramuscular Q2 Min PRN    Or     naloxone  0.4 mg Intramuscular Q2 Min PRN     ondansetron  4 mg Oral Q6H PRN    Or     ondansetron  4 mg Intravenous Q6H PRN     oxyCODONE  5-10 mg Oral Q3H PRN     QUEtiapine  50 mg Oral TID PRN     sodium chloride (PF)  3 mL Intracatheter q1 min prn            Data:      Lab data reviewed.   Recent Labs   Lab 06/30/21  0732 06/29/21  0703 06/28/21  0754 06/25/21 1931 06/25/21 1931   HGB 7.5* 7.7* 7.5*   < > 8.4*   MCV 94 93 96   < > 96   * 114* 125*   < > 231   INR  --   --   --   --  1.24*    133 131*   < > 142   POTASSIUM 4.3 4.9 4.4   < > 4.1   CHLORIDE 110* 105 102   < > 114*   CO2 22 23 22   < > 16*   BUN 32* 33* 32*   < > 32*   CR 2.69* 2.71* 2.46*   < > 2.08*   ANIONGAP 6 5 7   < > 12   KEV 8.0* 8.4* 8.1*   < > 8.0*   GLC 88 104* 98   < > 63*    < > = values in this interval not displayed.           Imaging:      Imaging data reviewed.     Dr. Piter Sequeira D.O.  [facility]  Pager [phone]

## 2021-07-01 NOTE — PLAN OF CARE
Patient A/Ox3, disoriented to time. VSS on RA. Patient denies pain. Tremor and muscle jerking are present and patient complains of mild anxiety, but not enough to qualify for CIWA medication intervention. Patient given PRN seroquel for anxiety at 1800.

## 2021-07-01 NOTE — PROGRESS NOTES
MD Notification    Notified Person: MD    Notified Person Name: Piter Sequeira DO    Notification Date/Time: 1545 July 1    Notification Interaction: Web Page    Purpose of Notification: hgb of 7.3, trending low, would you like any new orders?    Orders Received: Labs and stool sample ordered    Comments:

## 2021-07-01 NOTE — PLAN OF CARE
Pt more confused to date and time overnight, reorient PRN. VSS except htn, not w/in parameters for prn. CIWA 3, 4, 4 for tremors and disorientation, gave prn seroquel per pt request. On 1500 mL fluid restriction and had 600 mL overnight. Leger patent w/god UOP. Paracentesis sites w/bandaids, CDI but L side bruised and abdomen distended and taut. PIV now SL but getting intermittent albumin. Will need TCU w/CD services @ discharge when medically stable.

## 2021-07-01 NOTE — PROGRESS NOTES
Per PT, pt declining therapy today d/t fatigue/weakness despite encouragement. Will r/s session for 7/2.

## 2021-07-01 NOTE — PROGRESS NOTES
SPIRITUAL HEALTH SERVICES Progress Note  [facility]    Visit with pt, per his lengthy hospital stay.  Pt is known to me and other chaplains from multiple, lengthy hospitalizations in the past.    Pt was able to share with me that his own drinking behaviors are in part causing his health challenges.  He would like to recover, and has continued to find it challenging to overcome his excessive alcohol use.    Provided reflective listening and support as pt discussed his current situation.    SH team is able to provide additional support per need or request.                                                                                                                                                 Patricia Gaffney M.A.  Staff   Phone [phone]

## 2021-07-01 NOTE — CONSULTS
[facility], Consult & Liaison    Jim Richard  July 1, 2021    Session start: 2:10 pm  Session end: 2:20 pm  Session duration in minutes: 10      Jim is followed related to alcohol use, cirrhois and chemical dependency commitment.    Therapeutic intervention and progress:  In individual therapeutic contact with patient today, patient presents with flat affect, sad mood, and reports symptoms of feeling down, anxious. Safety concerns are not present today. Therapeutic intervention consisted of active listening and validation. Patient declined any additional supports for his mental health at this time.     Collateral information:   Reviewed chart and coordinated with , Omayra, inquiring what was needed for commitment. She reports commitment is recently renewed and up to date, no information or paperwork is needed. She reports commitment is active for another year. She states she would like to coordinate regarding services, requests TCU or in home services,  states  would likely help with discharge planning.      Plan:     Continue care coordination with  on unit and county  to determine discharge plan    Patient will not continue to be followed by this service.    Reconsult psych as needed.    Cindy Zacarias, [facility], [facility], Consult & Liaison Service  [phone]

## 2021-07-02 ENCOUNTER — APPOINTMENT (OUTPATIENT)
Dept: ULTRASOUND IMAGING | Facility: CLINIC | Age: 66
DRG: 432 | End: 2021-07-02
Attending: INTERNAL MEDICINE
Payer: MEDICARE

## 2021-07-02 ENCOUNTER — APPOINTMENT (OUTPATIENT)
Dept: PHYSICAL THERAPY | Facility: CLINIC | Age: 66
DRG: 432 | End: 2021-07-02
Payer: MEDICARE

## 2021-07-02 LAB
ALBUMIN SERPL-MCNC: 3.2 G/DL (ref 3.4–5)
ALP SERPL-CCNC: 185 U/L (ref 40–150)
ALT SERPL W P-5'-P-CCNC: 22 U/L (ref 0–70)
AMMONIA PLAS-SCNC: 39 UMOL/L (ref 10–50)
ANION GAP SERPL CALCULATED.3IONS-SCNC: 8 MMOL/L (ref 3–14)
AST SERPL W P-5'-P-CCNC: 25 U/L (ref 0–45)
BASOPHILS # BLD AUTO: 0 10E9/L (ref 0–0.2)
BASOPHILS NFR BLD AUTO: 0.9 %
BILIRUB SERPL-MCNC: 0.9 MG/DL (ref 0.2–1.3)
BUN SERPL-MCNC: 30 MG/DL (ref 7–30)
CALCIUM SERPL-MCNC: 8.8 MG/DL (ref 8.5–10.1)
CHLORIDE SERPL-SCNC: 108 MMOL/L (ref 94–109)
CO2 SERPL-SCNC: 21 MMOL/L (ref 20–32)
CREAT SERPL-MCNC: 2.57 MG/DL (ref 0.66–1.25)
DIFFERENTIAL METHOD BLD: ABNORMAL
EOSINOPHIL # BLD AUTO: 0.1 10E9/L (ref 0–0.7)
EOSINOPHIL NFR BLD AUTO: 4.1 %
ERYTHROCYTE [DISTWIDTH] IN BLOOD BY AUTOMATED COUNT: 19.7 % (ref 10–15)
FERRITIN SERPL-MCNC: 178 NG/ML (ref 26–388)
FOLATE SERPL-MCNC: 58.7 NG/ML
GFR SERPL CREATININE-BSD FRML MDRD: 25 ML/MIN/{1.73_M2}
GLUCOSE SERPL-MCNC: 79 MG/DL (ref 70–99)
HAPTOGLOB SERPL-MCNC: 122 MG/DL (ref 32–197)
HCT VFR BLD AUTO: 25.5 % (ref 40–53)
HGB BLD-MCNC: 8.2 G/DL (ref 13.3–17.7)
IMM GRANULOCYTES # BLD: 0 10E9/L (ref 0–0.4)
IMM GRANULOCYTES NFR BLD: 0 %
IRON SATN MFR SERPL: 23 % (ref 15–46)
IRON SERPL-MCNC: 46 UG/DL (ref 35–180)
LDH SERPL L TO P-CCNC: 152 U/L (ref 85–227)
LYMPHOCYTES # BLD AUTO: 0.4 10E9/L (ref 0.8–5.3)
LYMPHOCYTES NFR BLD AUTO: 11 %
MCH RBC QN AUTO: 30.8 PG (ref 26.5–33)
MCHC RBC AUTO-ENTMCNC: 32.2 G/DL (ref 31.5–36.5)
MCV RBC AUTO: 96 FL (ref 78–100)
MONOCYTES # BLD AUTO: 0.6 10E9/L (ref 0–1.3)
MONOCYTES NFR BLD AUTO: 16.2 %
NEUTROPHILS # BLD AUTO: 2.3 10E9/L (ref 1.6–8.3)
NEUTROPHILS NFR BLD AUTO: 67.8 %
NRBC # BLD AUTO: 0 10*3/UL
NRBC BLD AUTO-RTO: 0 /100
PLATELET # BLD AUTO: 112 10E9/L (ref 150–450)
POTASSIUM SERPL-SCNC: 3.9 MMOL/L (ref 3.4–5.3)
PROT SERPL-MCNC: 6.6 G/DL (ref 6.8–8.8)
RBC # BLD AUTO: 2.66 10E12/L (ref 4.4–5.9)
RETICS # AUTO: 53.2 10E9/L (ref 25–95)
RETICS/RBC NFR AUTO: 2 % (ref 0.5–2)
SODIUM SERPL-SCNC: 137 MMOL/L (ref 133–144)
TIBC SERPL-MCNC: 203 UG/DL (ref 240–430)
VIT B12 SERPL-MCNC: 760 PG/ML (ref 193–986)
WBC # BLD AUTO: 3.5 10E9/L (ref 4–11)

## 2021-07-02 PROCEDURE — 250N000013 HC RX MED GY IP 250 OP 250 PS 637: Performed by: INTERNAL MEDICINE

## 2021-07-02 PROCEDURE — 85025 COMPLETE CBC W/AUTO DIFF WBC: CPT | Performed by: HOSPITALIST

## 2021-07-02 PROCEDURE — 85060 BLOOD SMEAR INTERPRETATION: CPT | Performed by: PATHOLOGY

## 2021-07-02 PROCEDURE — 99232 SBSQ HOSP IP/OBS MODERATE 35: CPT | Performed by: INTERNAL MEDICINE

## 2021-07-02 PROCEDURE — 83615 LACTATE (LD) (LDH) ENZYME: CPT | Performed by: HOSPITALIST

## 2021-07-02 PROCEDURE — 99233 SBSQ HOSP IP/OBS HIGH 50: CPT | Performed by: INTERNAL MEDICINE

## 2021-07-02 PROCEDURE — 258N000003 HC RX IP 258 OP 636: Performed by: INTERNAL MEDICINE

## 2021-07-02 PROCEDURE — 250N000011 HC RX IP 250 OP 636: Performed by: INTERNAL MEDICINE

## 2021-07-02 PROCEDURE — 82140 ASSAY OF AMMONIA: CPT | Performed by: HOSPITALIST

## 2021-07-02 PROCEDURE — 120N000001 HC R&B MED SURG/OB

## 2021-07-02 PROCEDURE — 83540 ASSAY OF IRON: CPT | Performed by: HOSPITALIST

## 2021-07-02 PROCEDURE — 999N000154 HC STATISTIC RADIOLOGY XRAY, US, CT, MAR, NM

## 2021-07-02 PROCEDURE — 999N001109 HC STATISTIC MORPHOLOGY W/INTERP HISTOLOGY TC 85060: Performed by: HOSPITALIST

## 2021-07-02 PROCEDURE — 80053 COMPREHEN METABOLIC PANEL: CPT | Performed by: HOSPITALIST

## 2021-07-02 PROCEDURE — 83550 IRON BINDING TEST: CPT | Performed by: HOSPITALIST

## 2021-07-02 PROCEDURE — 49083 ABD PARACENTESIS W/IMAGING: CPT

## 2021-07-02 PROCEDURE — 0W9G3ZZ DRAINAGE OF PERITONEAL CAVITY, PERCUTANEOUS APPROACH: ICD-10-PCS | Performed by: RADIOLOGY

## 2021-07-02 PROCEDURE — 97530 THERAPEUTIC ACTIVITIES: CPT | Mod: GP | Performed by: PHYSICAL THERAPIST

## 2021-07-02 PROCEDURE — 82728 ASSAY OF FERRITIN: CPT | Performed by: HOSPITALIST

## 2021-07-02 PROCEDURE — 97116 GAIT TRAINING THERAPY: CPT | Mod: GP | Performed by: PHYSICAL THERAPIST

## 2021-07-02 PROCEDURE — 85045 AUTOMATED RETICULOCYTE COUNT: CPT | Performed by: HOSPITALIST

## 2021-07-02 PROCEDURE — 82607 VITAMIN B-12: CPT | Performed by: HOSPITALIST

## 2021-07-02 PROCEDURE — 82746 ASSAY OF FOLIC ACID SERUM: CPT | Performed by: HOSPITALIST

## 2021-07-02 PROCEDURE — 83010 ASSAY OF HAPTOGLOBIN QUANT: CPT | Performed by: HOSPITALIST

## 2021-07-02 PROCEDURE — 36415 COLL VENOUS BLD VENIPUNCTURE: CPT | Performed by: HOSPITALIST

## 2021-07-02 PROCEDURE — 250N000013 HC RX MED GY IP 250 OP 250 PS 637: Performed by: HOSPITALIST

## 2021-07-02 RX ORDER — SPIRONOLACTONE 25 MG/1
25 TABLET ORAL DAILY
Status: DISCONTINUED | OUTPATIENT
Start: 2021-07-02 | End: 2021-07-02

## 2021-07-02 RX ORDER — DEXTROSE MONOHYDRATE 25 G/50ML
25-50 INJECTION, SOLUTION INTRAVENOUS
Status: DISCONTINUED | OUTPATIENT
Start: 2021-07-02 | End: 2021-07-14 | Stop reason: HOSPADM

## 2021-07-02 RX ORDER — ALBUMIN (HUMAN) 12.5 G/50ML
12.5 SOLUTION INTRAVENOUS ONCE
Status: DISCONTINUED | OUTPATIENT
Start: 2021-07-02 | End: 2021-07-02 | Stop reason: CLARIF

## 2021-07-02 RX ORDER — LIDOCAINE HYDROCHLORIDE 10 MG/ML
10 INJECTION, SOLUTION EPIDURAL; INFILTRATION; INTRACAUDAL; PERINEURAL ONCE
Status: COMPLETED | OUTPATIENT
Start: 2021-07-02 | End: 2021-07-02

## 2021-07-02 RX ORDER — FUROSEMIDE 40 MG
40 TABLET ORAL DAILY
Status: DISCONTINUED | OUTPATIENT
Start: 2021-07-02 | End: 2021-07-02

## 2021-07-02 RX ORDER — NICOTINE POLACRILEX 4 MG
15-30 LOZENGE BUCCAL
Status: DISCONTINUED | OUTPATIENT
Start: 2021-07-02 | End: 2021-07-14 | Stop reason: HOSPADM

## 2021-07-02 RX ORDER — LIDOCAINE 40 MG/G
CREAM TOPICAL
Status: DISCONTINUED | OUTPATIENT
Start: 2021-07-02 | End: 2021-07-07

## 2021-07-02 RX ADMIN — OXYCODONE HYDROCHLORIDE 5 MG: 5 TABLET ORAL at 04:41

## 2021-07-02 RX ADMIN — FINASTERIDE 5 MG: 5 TABLET, FILM COATED ORAL at 08:31

## 2021-07-02 RX ADMIN — MAGNESIUM OXIDE TAB 400 MG (241.3 MG ELEMENTAL MG) 400 MG: 400 (241.3 MG) TAB at 08:31

## 2021-07-02 RX ADMIN — QUETIAPINE 200 MG: 200 TABLET, FILM COATED ORAL at 21:47

## 2021-07-02 RX ADMIN — MIDODRINE HYDROCHLORIDE 10 MG: 5 TABLET ORAL at 11:06

## 2021-07-02 RX ADMIN — MICONAZOLE NITRATE: 20 POWDER TOPICAL at 08:33

## 2021-07-02 RX ADMIN — TAMSULOSIN HYDROCHLORIDE 0.8 MG: 0.4 CAPSULE ORAL at 08:31

## 2021-07-02 RX ADMIN — LACTULOSE 20 G: 10 SOLUTION ORAL at 08:40

## 2021-07-02 RX ADMIN — IRON SUCROSE 200 MG: 20 INJECTION, SOLUTION INTRAVENOUS at 11:05

## 2021-07-02 RX ADMIN — TRAZODONE HYDROCHLORIDE 100 MG: 100 TABLET ORAL at 21:47

## 2021-07-02 RX ADMIN — QUETIAPINE FUMARATE 50 MG: 25 TABLET ORAL at 16:45

## 2021-07-02 RX ADMIN — VORTIOXETINE 10 MG: 10 TABLET, FILM COATED ORAL at 08:31

## 2021-07-02 RX ADMIN — GABAPENTIN 300 MG: 300 CAPSULE ORAL at 08:31

## 2021-07-02 RX ADMIN — GABAPENTIN 300 MG: 300 CAPSULE ORAL at 16:45

## 2021-07-02 RX ADMIN — PANTOPRAZOLE SODIUM 40 MG: 40 TABLET, DELAYED RELEASE ORAL at 21:47

## 2021-07-02 RX ADMIN — FOLIC ACID 1 MG: 1 TABLET ORAL at 08:31

## 2021-07-02 RX ADMIN — MULTIPLE VITAMINS W/ MINERALS TAB 1 TABLET: TAB at 08:31

## 2021-07-02 RX ADMIN — MIRTAZAPINE 30 MG: 15 TABLET, FILM COATED ORAL at 21:46

## 2021-07-02 RX ADMIN — PANTOPRAZOLE SODIUM 40 MG: 40 TABLET, DELAYED RELEASE ORAL at 08:31

## 2021-07-02 RX ADMIN — LACTULOSE 20 G: 10 SOLUTION ORAL at 21:46

## 2021-07-02 RX ADMIN — OXYCODONE HYDROCHLORIDE 5 MG: 5 TABLET ORAL at 10:54

## 2021-07-02 RX ADMIN — GABAPENTIN 300 MG: 300 CAPSULE ORAL at 21:47

## 2021-07-02 RX ADMIN — NICOTINE 1 PATCH: 21 PATCH, EXTENDED RELEASE TRANSDERMAL at 22:07

## 2021-07-02 RX ADMIN — THIAMINE HCL TAB 100 MG 100 MG: 100 TAB at 08:31

## 2021-07-02 RX ADMIN — MIDODRINE HYDROCHLORIDE 10 MG: 5 TABLET ORAL at 08:30

## 2021-07-02 RX ADMIN — THIAMINE HCL TAB 100 MG 100 MG: 100 TAB at 16:45

## 2021-07-02 RX ADMIN — MIDODRINE HYDROCHLORIDE 10 MG: 5 TABLET ORAL at 16:44

## 2021-07-02 RX ADMIN — LIDOCAINE HYDROCHLORIDE 10 ML: 10 INJECTION, SOLUTION EPIDURAL; INFILTRATION; INTRACAUDAL; PERINEURAL at 13:39

## 2021-07-02 RX ADMIN — QUETIAPINE FUMARATE 50 MG: 25 TABLET ORAL at 09:06

## 2021-07-02 RX ADMIN — VORTIOXETINE 10 MG: 10 TABLET, FILM COATED ORAL at 21:46

## 2021-07-02 ASSESSMENT — ACTIVITIES OF DAILY LIVING (ADL)
ADLS_ACUITY_SCORE: 14
ADLS_ACUITY_SCORE: 16
ADLS_ACUITY_SCORE: 14

## 2021-07-02 NOTE — PROGRESS NOTES
IR Note    Discussed IR placed pleurx catheter with the patient today.  Pt has recurrent ascites with alcoholic cirrhosis.  At this time, the patient has requested to continue with paracenteses rather than placing a pleurx tube.  He can of course change his mind down the road especially if the ascites recurs very frequently and it affects his ADL.     Thank you for contacting us for the consult.    Lavell Nunes PA-C  [phone]

## 2021-07-02 NOTE — CONSULTS
Care Management Follow Up    Length of Stay (days): 6    Expected Discharge Date: 07/06/21     Concerns to be Addressed: discharge planning     Patient plan of care discussed at interdisciplinary rounds: Yes    Anticipated Discharge Disposition:  TCU     Anticipated Discharge Services:    Anticipated Discharge DME:      Patient/family educated on Medicare website which has current facility and service quality ratings:    Education Provided on the Discharge Plan:    Patient/Family in Agreement with the Plan:      Referrals Placed by CM/SW:  TCU  Private pay costs discussed: Not applicable    Additional Information:  SW placed call to Omayra ([phone]), patient's commitment .  Omayra indicated that patient has an active commitment and was discharged from previous CD treatment facility [facility] secondary to not being able to participate in programming/physical abilities. DANIELE discussed recommendations for TCU placement and Omayra stated that she will write into patient's commitment that he needs placement.  She does not feel it is safe for patient to discharge to home as last time he discharged to home with home care, he cancelled services before they even came out for initial assessment. Omayra is not working this weekend but will check VM and can respond if placement is found. Patient also has meals on wheels services provided.  Referrals sent to TCU facilities for review. [facility] to send referrals to for patient. Attempted to meet with patient x2 but unavailable.  DANIELE will follow up with patient to discuss discharge to TCU.       MIRTA Navarro

## 2021-07-02 NOTE — PROGRESS NOTES
Assessment and Plan:   NGUYEN: improving. Likely ws HRS but UO now improved. On midodrine but off NS, alb. Never got octreo. On 1500 ml fluid restriction. Low Na diet.   Cr improving last 4 days, now 2.57. Lytes with Na 137, K 3.9, HCO3 21. Alb 3.2.     Cont current treatment.  Re check labs maybe Sunday. We will see on Monday.             Interval History:   Pancytopenia: getting venofer IV. Nl B12 and folate.  Alcoholism  Cirrhosis: ascites. Paracentesis today.                   Review of Systems:   C/O abd distention. C/O tremulous.           Medications:       finasteride  5 mg Oral Daily     folic acid  1 mg Oral Daily     gabapentin  300 mg Oral TID     iron sucrose (VENOFER) intermittent infusion  200 mg Intravenous Daily     lactulose  20 g Oral BID     magnesium oxide  400 mg Oral Daily     miconazole   Topical BID     midodrine  10 mg Oral TID w/meals     mirtazapine  30 mg Oral At Bedtime     multivitamin w/minerals  1 tablet Oral Daily     nicotine  1 patch Transdermal Q24H     nicotine   Transdermal Q8H     pantoprazole  40 mg Oral BID     QUEtiapine  200 mg Oral At Bedtime     sodium chloride (PF)  3 mL Intracatheter Q8H     tamsulosin  0.8 mg Oral Daily     thiamine  100 mg Oral TID    Followed by     [START ON 7/3/2021] thiamine  100 mg Oral Daily     traZODone  100 mg Oral At Bedtime     vortioxetine  10 mg Oral BID       - MEDICATION INSTRUCTIONS -       Current active medications and PTA medications reviewed, see medication list for details.            Physical Exam:   Vitals were reviewed  Patient Vitals for the past 24 hrs:   BP Temp Temp src Pulse Resp SpO2   07/02/21 0745 134/70 97.3  F (36.3  C) Axillary 72 16 98 %   07/02/21 0441 -- -- -- -- 16 --   07/02/21 0400 125/65 97  F (36.1  C) Oral 75 16 94 %   07/02/21 0100 123/68 97.6  F (36.4  C) Oral 75 16 94 %   07/01/21 2014 127/63 97.1  F (36.2  C) Oral 56 16 96 %   07/01/21 1509 (!) 143/68 97.5  F (36.4  C) Oral 73 16 97 %       Temp:   [97  F (36.1  C)-97.6  F (36.4  C)] 97.3  F (36.3  C)  Pulse:  [56-75] 72  Resp:  [16] 16  BP: (123-143)/(63-70) 134/70  SpO2:  [94 %-98 %] 98 %    Temperatures:  Current - Temp: 97.3  F (36.3  C); Max - Temp  Av.3  F (36.3  C)  Min: 97  F (36.1  C)  Max: 97.6  F (36.4  C)  Respiration range: Resp  Av  Min: 16  Max: 16  Pulse range: Pulse  Av.2  Min: 56  Max: 75  Blood pressure range: Systolic (24hrs), Av , Min:123 , Max:143   ; Diastolic (24hrs), Av, Min:63, Max:70    Pulse oximetry range: SpO2  Av.8 %  Min: 94 %  Max: 98 %    I/O last 3 completed shifts:  In: -   Out: 1250 [Urine:1250]      Intake/Output Summary (Last 24 hours) at 2021 1318  Last data filed at 2021 0900  Gross per 24 hour   Intake 720 ml   Output 1250 ml   Net -530 ml     Resting comfortably in bed  Leger in place  Distended abd with fluid wave       Wt Readings from Last 4 Encounters:   21 76.7 kg (169 lb)   21 81.1 kg (178 lb 11.2 oz)   21 86.2 kg (190 lb)   21 86.2 kg (190 lb)          Data:          Lab Results   Component Value Date     2021     2021     2021    Lab Results   Component Value Date    CHLORIDE 108 2021    CHLORIDE 111 2021    CHLORIDE 110 2021        Lab Results   Component Value Date    BUN 30 2021    BUN 31 2021    BUN 32 2021        Lab Results   Component Value Date    POTASSIUM 3.9 2021    POTASSIUM 4.3 2021    POTASSIUM 4.3 2021    Lab Results   Component Value Date    CO2 21 2021    CO2 25 2021    CO2 22 2021    Lab Results   Component Value Date    CR 2.57 2021    CR 2.67 2021    CR 2.69 2021        Recent Labs   Lab Test 21  0816 21  1647 21  0730   WBC 3.5* 3.9* 3.3*   HGB 8.2* 7.8* 7.3*   HCT 25.5* 24.3* 22.6*   MCV 96 94 95   * 112* 114*     Recent Labs   Lab Test 21  0816 21  1140 21  0803  06/25/21  1931 06/21/14  0722 06/21/14  0722   AST 25  --  35 34   < >  --    ALT 22  --  21 22   < >  --    GGT  --   --   --   --   --  500*   ALKPHOS 185*  --  189* 218*   < >  --    BILITOTAL 0.9  --  1.1 0.7   < >  --    ELPIDIO 39 66*  --  33   < >  --     < > = values in this interval not displayed.       Recent Labs   Lab Test 06/30/21  0732 06/28/21  0754 06/08/21  0824   MAG 1.9 1.9 1.7     Recent Labs   Lab Test 07/01/21  0730 06/30/21  0732 06/29/21  0703   PHOS 3.4 3.3 3.1     Recent Labs   Lab Test 07/02/21  0816 07/01/21  0730 06/30/21  0732   KEV 8.8 8.3* 8.0*       Lab Results   Component Value Date    KEV 8.8 07/02/2021     Lab Results   Component Value Date    WBC 3.5 (L) 07/02/2021    HGB 8.2 (L) 07/02/2021    HCT 25.5 (L) 07/02/2021    MCV 96 07/02/2021     (L) 07/02/2021     Lab Results   Component Value Date     07/02/2021    POTASSIUM 3.9 07/02/2021    CHLORIDE 108 07/02/2021    CO2 21 07/02/2021    GLC 79 07/02/2021     Lab Results   Component Value Date    BUN 30 07/02/2021    CR 2.57 (H) 07/02/2021     Lab Results   Component Value Date    MAG 1.9 06/30/2021     Lab Results   Component Value Date    PHOS 3.4 07/01/2021       Creatinine   Date Value Ref Range Status   07/02/2021 2.57 (H) 0.66 - 1.25 mg/dL Final   07/01/2021 2.67 (H) 0.66 - 1.25 mg/dL Final   06/30/2021 2.69 (H) 0.66 - 1.25 mg/dL Final   06/29/2021 2.71 (H) 0.66 - 1.25 mg/dL Final   06/28/2021 2.46 (H) 0.66 - 1.25 mg/dL Final   06/27/2021 2.29 (H) 0.66 - 1.25 mg/dL Final       Attestation:  I have reviewed today's vital signs, notes, medications, labs and imaging.     Arnaldo Frankel MD

## 2021-07-02 NOTE — PROGRESS NOTES
[facility]    Medicine Progress Note - Hospitalist Service       Date of Admission:  6/25/2021    Assessment & Plan   Jim Richard is a 66 year old male with hx of alcoholic cirrhosis with ascites and ongoing alcohol use who was admitted on 6/25/2021 for recurrent ascites. Hospital course complicated by alcohol withdrawal    Alcoholic cirrhosis with recurrent ascities  * Patient missed his weekly paracentesis and presented with markedly distended abdomen with associated pain and respiratory distress  * Underwent paracentesis on 6/26 with removal of 5.9L fluid and again on 6/29 with removal of 5.2L. No evidence of SBP. He is not on diuretics due to propensity for NGUYEN  - Repeat paracentesis today, 7/2  - IR consult requested for consideration of PleurX catheter  - On 1500 mL fluid restriction  - He has been initiated on lactulose 20g BID for goal 2-3 BM daily  - Continues on PTA midodrine  - On IV iron per Nephrology    NGUYEN on CKD stage IV, Improved  Baseline creatinine 2-2.2, presumably due to hepatorenal syndrome. Creatinine up to 2.71 during this hospitalization. Nephrology was consulted, and he was treated with IV fluids and albumin   - Cr 2.57 today, will follow  - Continues on PTA midodrine  - Nephrology following    Hypervolemic hyponatremia, Resolved  Na fabiola 127, resolved with fluid restriction  - On 1500 mL fluid restricution    Pancytopenia due to alcoholic cirrhosis  CBC has been stable with no s/sx of bleeding or infection during this hospitalization  CBC RESULTS:   Recent Labs   Lab Test 07/02/21  0816   WBC 3.5*   RBC 2.66*   HGB 8.2*   HCT 25.5*   MCV 96   MCH 30.8   MCHC 32.2   RDW 19.7*   *       Urinary retention  History of BPH  Coude catheter placed 6/29 for ongoing urinary retention  - Coude catheter in place, 6/29 to present  - Attempt voiding trial in the next few days  - He was initiated on finasteride during this hospitalization  - Continues on PTA  tamsulosin     Acute alcohol withdrawal, Improving  Alcohol dependence  Acutely intoxicated on arrival. BAC 0.34. Developed alcohol withdrawal following admission, and was treated with lorazepam per CIWA. Patient is apparently under active commitment that was recently renewed for another year.  - Continues on CIWA with lorazepam as needed  - On thiamine/folate/MVI  - On K/Mg/Phos replacement protocols  - CD recommending residential treatment vs TCU with CD services. PT eval pending  - SW consult requested    Major recurrent depressive disorder with anxiety  * PTA: gabapentin 300 mg TID, mirtazapine 30 mg qhs, vortioxetine 10 mg BID, quetiapine 200 mg qhs and 50 mg TID prn, eszopiclone 3 mg qhs prn sleep]  - Continues on PTA meds, no adjustments per Psych during this admission     COPD  Chronic and stable on PTA inhalers    Nicotine dependence  Nicotine patch in place     JANIS  Continues on CPAP per home setting    Umbilical Hernia  Chronic and stable. Recently evaluated by General Surgery; no intervention recommended     Physical deconditioning in context of acute on chronic medical illness  - PT/OT recommending TCU. SW to look into commitment details     Diet: Low Saturated Fat Na <2400 mg  Fluid restriction 1500 ML FLUID    DVT Prophylaxis: Pneumatic Compression Devices  Leger Catheter: PRESENT, indication: Retention  Code Status: No CPR- Do NOT Intubate      Disposition: Expected discharge pending placement, clarification of commitment details. TCU over the weekend if not a candidate for PleurX. Otherwise after placement of PleurX, ?Tues    The patient's care was discussed with the Care Coordinator/ and Patient.    Sara Orellana MD  Hospitalist Service  [facility]  Contact information available via [facility] Paging/Directory    ______________________________________________________________________    Interval History   No events overnight. Reports increased abdominal  distension and associated discomfort. Discussed potential for PleurX which he is agreeable to. Repeat paracentesis today, IR consult placed.    Data reviewed today: I reviewed all medications, new labs and imaging results over the last 24 hours. I personally reviewed no images or EKG's today.    Physical Exam   Vital Signs: Temp: 97  F (36.1  C) Temp src: Oral BP: 125/65 Pulse: 75   Resp: 16 SpO2: 94 % O2 Device: None (Room air)    Weight: 169 lbs 0 oz  Constitutional: Resting in bed, NAD  HEENT: Sclera white, MMM  Respiratory: Breathing non-labored. Lungs CTAB - no wheezes, crackles, or rhonchi  Cardiovascular: Heart RRR, no m/r/g. No pedal edema  GI: +BS, abd distended, soft, mild TTP throughout. No rebound/guarding  Skin/Integument: No rash  Musculoskeletal: Normal muscle bulk and tone  Neuro: Alert and appropriate, BADILLO  Psych: Calm and cooperative    Data   Recent Labs   Lab 07/01/21  1647 07/01/21  0730 06/30/21  0732 06/29/21  0703 06/27/21  0803 06/27/21  0803 06/25/21  1931   WBC 3.9* 3.3* 3.4* 3.8*   < > 3.9* 7.6   HGB 7.8* 7.3* 7.5* 7.7*   < > 7.5* 8.4*   MCV 94 95 94 93   < > 94 96   * 114* 106* 114*   < > 120* 231   INR  --   --   --   --   --   --  1.24*   NA  --  140 138 133   < > 131* 142   POTASSIUM  --  4.3 4.3 4.9   < > 4.6 4.1   CHLORIDE  --  111* 110* 105   < > 104 114*   CO2  --  25 22 23   < > 21 16*   BUN  --  31* 32* 33*   < > 32* 32*   CR  --  2.67* 2.69* 2.71*   < > 2.29* 2.08*   ANIONGAP  --  4 6 5   < > 6 12   KEV  --  8.3* 8.0* 8.4*   < > 8.1* 8.0*   GLC  --  93 88 104*   < > 122* 63*   ALBUMIN  --  3.1* 2.7* 2.4*   < > 2.5* 3.0*   PROTTOTAL  --   --   --   --   --  6.1* 7.1   BILITOTAL  --   --   --   --   --  1.1 0.7   ALKPHOS  --   --   --   --   --  189* 218*   ALT  --   --   --   --   --  21 22   AST  --   --   --   --   --  35 34    < > = values in this interval not displayed.         No results found for this or any previous visit (from the past 24 hour(s)).    Medications      - MEDICATION INSTRUCTIONS -         finasteride  5 mg Oral Daily     folic acid  1 mg Oral Daily     gabapentin  300 mg Oral TID     iron sucrose (VENOFER) intermittent infusion  200 mg Intravenous Daily     lactulose  20 g Oral BID     magnesium oxide  400 mg Oral Daily     miconazole   Topical BID     midodrine  10 mg Oral TID w/meals     mirtazapine  30 mg Oral At Bedtime     multivitamin w/minerals  1 tablet Oral Daily     nicotine  1 patch Transdermal Q24H     nicotine   Transdermal Q8H     pantoprazole  40 mg Oral BID     QUEtiapine  200 mg Oral At Bedtime     sodium chloride (PF)  3 mL Intracatheter Q8H     tamsulosin  0.8 mg Oral Daily     thiamine  100 mg Oral TID    Followed by     [START ON 7/3/2021] thiamine  100 mg Oral Daily     traZODone  100 mg Oral At Bedtime     vortioxetine  10 mg Oral BID

## 2021-07-02 NOTE — PROGRESS NOTES
Pre-procedure assessment complete: Yes  If abnormal assessment/labs, provider notified: N/A    Medications held per instructions/orders: N/A  Procedure explained. All questions & concerns addressed: Yes  Consent: obtained      Paracentesis:   Patient tolerated well. VSS. 5800 cc straw-yellow fluid removed from abdomen w/o difficulty. Bandaid applied to site - CDI.   Albumin NOT given per protocol / standing orders.    Transport brought Pt back to 88. Report taken by receiving RN.

## 2021-07-02 NOTE — PROGRESS NOTES
"BRIEF NUTRITION ASSESSMENT      REASON FOR ASSESSMENT:  Jim Richard is a 66 year old male seen by Registered Dietitian for LOS    Patient admitted with -->  Alcoholic cirrhosis with recurrent ascities   Alcohol dependence and intoxication --> withdrawal   NGUYEN     NUTRITION HISTORY:  Patient is well known to our services as he has been admitted multiple times over the last year.  He typically eats two meals per day at home and notes that he eats better in the hospital than at home due to his ETOH dependence.  Appetite has been stable and he continues to receive weekly paracentesis and therefore weights fluctuate between 170-190#.    CURRENT DIET AND INTAKE:  Diet:  LSF, < 2400 mg Na, 1500 mL fluid restriction               Patient states that he has been eating well  Breakfast today was eggs, homestyle potatoes, and Diet Pepsi     \"I may be going home today\"    ANTHROPOMETRICS:  Height: 5' 7\"  Weight:  76.7 kg (169#)(7/1)  Body mass index is 26.47 kg/m    Weight Status: Overweight BMI 25-29.9  IBW:  67.3 kg   %IBW: 114%  Weight History:   Wt Readings from Last 10 Encounters:   07/01/21 76.7 kg (169 lb)   06/17/21 81.1 kg (178 lb 11.2 oz)   05/07/21 86.2 kg (190 lb)   05/01/21 86.2 kg (190 lb)   03/26/21 86.2 kg (190 lb)   03/16/21 78.4 kg (172 lb 12.8 oz)   03/10/21 86.2 kg (190 lb)   02/13/21 79.5 kg (175 lb 4.3 oz)   02/03/21 86.2 kg (190 lb)   01/26/21 82.9 kg (182 lb 12.2 oz)     Weight fluctuations due to fluids (frequent paracentesis)    LABS:  Labs noted    MALNUTRITION:  Visual Nutrition Focused Physical Assessment (NFPA) completed due to restrictions on face-to-face patient care during COVID-19 Pandemic. No muscle/fat losses noted.  Patient does not meet two of the following criteria necessary for diagnosing malnutrition.     % Weight Loss:  Weight loss does not meet criteria for malnutrition (fluid fluctuations)  % Intake:  No decreased intake noted  Subcutaneous Fat Loss:  None observed  Muscle " Loss:  None observed  Fluid Retention:  None noted    NUTRITION INTERVENTION:  Nutrition Diagnosis:  No nutrition diagnosis at this time.    Implementation:  Nutrition Education:  Per Provider order if indicated.  Medical food supplement therapy:  4oz Ensure BID between meals.     FOLLOW UP/MONITORING:   Will re-evaluate in 7 - 10 days, or sooner, if re-consulted.    Nesha Muñoz RD, LD, CNSC   Clinical Dietitian - [facility]

## 2021-07-02 NOTE — PLAN OF CARE
Pt c/o sig tremors and twitching,VSS  CIWA for tremors only, no PRNs. On 1500 mL fluid restriction. Leger patent w/god UOP. Paracentesis sites w/bandaids x 2, CDI but L side bruised and abdomen distended and taut. PIV now SL Psych consult.  Will need TCU w/CD services @ discharge when medically stable.

## 2021-07-03 LAB
ANION GAP SERPL CALCULATED.3IONS-SCNC: 6 MMOL/L (ref 3–14)
BUN SERPL-MCNC: 32 MG/DL (ref 7–30)
CALCIUM SERPL-MCNC: 8.4 MG/DL (ref 8.5–10.1)
CHLORIDE SERPL-SCNC: 107 MMOL/L (ref 94–109)
CO2 SERPL-SCNC: 23 MMOL/L (ref 20–32)
CREAT SERPL-MCNC: 2.63 MG/DL (ref 0.66–1.25)
GFR SERPL CREATININE-BSD FRML MDRD: 24 ML/MIN/{1.73_M2}
GLUCOSE BLDC GLUCOMTR-MCNC: 101 MG/DL (ref 70–99)
GLUCOSE BLDC GLUCOMTR-MCNC: 97 MG/DL (ref 70–99)
GLUCOSE SERPL-MCNC: 94 MG/DL (ref 70–99)
LABORATORY COMMENT REPORT: NORMAL
MAGNESIUM SERPL-MCNC: 1.9 MG/DL (ref 1.6–2.3)
PHOSPHATE SERPL-MCNC: 3.4 MG/DL (ref 2.5–4.5)
POTASSIUM SERPL-SCNC: 4.1 MMOL/L (ref 3.4–5.3)
SARS-COV-2 RNA RESP QL NAA+PROBE: NEGATIVE
SODIUM SERPL-SCNC: 136 MMOL/L (ref 133–144)
SPECIMEN SOURCE: NORMAL

## 2021-07-03 PROCEDURE — 36415 COLL VENOUS BLD VENIPUNCTURE: CPT | Performed by: INTERNAL MEDICINE

## 2021-07-03 PROCEDURE — 83735 ASSAY OF MAGNESIUM: CPT | Performed by: INTERNAL MEDICINE

## 2021-07-03 PROCEDURE — 250N000013 HC RX MED GY IP 250 OP 250 PS 637: Performed by: INTERNAL MEDICINE

## 2021-07-03 PROCEDURE — 80048 BASIC METABOLIC PNL TOTAL CA: CPT | Performed by: INTERNAL MEDICINE

## 2021-07-03 PROCEDURE — 999N001017 HC STATISTIC GLUCOSE BY METER IP

## 2021-07-03 PROCEDURE — 99233 SBSQ HOSP IP/OBS HIGH 50: CPT | Performed by: INTERNAL MEDICINE

## 2021-07-03 PROCEDURE — 87635 SARS-COV-2 COVID-19 AMP PRB: CPT | Performed by: INTERNAL MEDICINE

## 2021-07-03 PROCEDURE — 250N000011 HC RX IP 250 OP 636: Performed by: INTERNAL MEDICINE

## 2021-07-03 PROCEDURE — 250N000013 HC RX MED GY IP 250 OP 250 PS 637: Performed by: HOSPITALIST

## 2021-07-03 PROCEDURE — 258N000003 HC RX IP 258 OP 636: Performed by: INTERNAL MEDICINE

## 2021-07-03 PROCEDURE — 120N000001 HC R&B MED SURG/OB

## 2021-07-03 PROCEDURE — 84100 ASSAY OF PHOSPHORUS: CPT | Performed by: INTERNAL MEDICINE

## 2021-07-03 RX ADMIN — QUETIAPINE 200 MG: 200 TABLET, FILM COATED ORAL at 21:25

## 2021-07-03 RX ADMIN — VORTIOXETINE 10 MG: 10 TABLET, FILM COATED ORAL at 08:22

## 2021-07-03 RX ADMIN — GABAPENTIN 300 MG: 300 CAPSULE ORAL at 16:12

## 2021-07-03 RX ADMIN — GABAPENTIN 300 MG: 300 CAPSULE ORAL at 08:22

## 2021-07-03 RX ADMIN — PANTOPRAZOLE SODIUM 40 MG: 40 TABLET, DELAYED RELEASE ORAL at 08:21

## 2021-07-03 RX ADMIN — IRON SUCROSE 200 MG: 20 INJECTION, SOLUTION INTRAVENOUS at 14:24

## 2021-07-03 RX ADMIN — FINASTERIDE 5 MG: 5 TABLET, FILM COATED ORAL at 08:21

## 2021-07-03 RX ADMIN — QUETIAPINE FUMARATE 50 MG: 25 TABLET ORAL at 10:03

## 2021-07-03 RX ADMIN — MIDODRINE HYDROCHLORIDE 10 MG: 5 TABLET ORAL at 18:20

## 2021-07-03 RX ADMIN — GABAPENTIN 300 MG: 300 CAPSULE ORAL at 21:26

## 2021-07-03 RX ADMIN — MAGNESIUM OXIDE TAB 400 MG (241.3 MG ELEMENTAL MG) 400 MG: 400 (241.3 MG) TAB at 08:21

## 2021-07-03 RX ADMIN — MIDODRINE HYDROCHLORIDE 10 MG: 5 TABLET ORAL at 08:21

## 2021-07-03 RX ADMIN — LACTULOSE 20 G: 10 SOLUTION ORAL at 10:02

## 2021-07-03 RX ADMIN — MIRTAZAPINE 30 MG: 15 TABLET, FILM COATED ORAL at 21:25

## 2021-07-03 RX ADMIN — OXYCODONE HYDROCHLORIDE 5 MG: 5 TABLET ORAL at 19:05

## 2021-07-03 RX ADMIN — NICOTINE 1 PATCH: 21 PATCH, EXTENDED RELEASE TRANSDERMAL at 22:32

## 2021-07-03 RX ADMIN — OXYCODONE HYDROCHLORIDE 5 MG: 5 TABLET ORAL at 02:11

## 2021-07-03 RX ADMIN — TAMSULOSIN HYDROCHLORIDE 0.8 MG: 0.4 CAPSULE ORAL at 08:21

## 2021-07-03 RX ADMIN — QUETIAPINE FUMARATE 50 MG: 25 TABLET ORAL at 16:25

## 2021-07-03 RX ADMIN — LACTULOSE 20 G: 10 SOLUTION ORAL at 22:31

## 2021-07-03 RX ADMIN — MIDODRINE HYDROCHLORIDE 10 MG: 5 TABLET ORAL at 14:16

## 2021-07-03 RX ADMIN — MULTIPLE VITAMINS W/ MINERALS TAB 1 TABLET: TAB at 08:21

## 2021-07-03 RX ADMIN — VORTIOXETINE 10 MG: 10 TABLET, FILM COATED ORAL at 21:26

## 2021-07-03 RX ADMIN — FOLIC ACID 1 MG: 1 TABLET ORAL at 08:22

## 2021-07-03 RX ADMIN — TRAZODONE HYDROCHLORIDE 100 MG: 100 TABLET ORAL at 21:25

## 2021-07-03 RX ADMIN — MICONAZOLE NITRATE: 20 POWDER TOPICAL at 14:25

## 2021-07-03 RX ADMIN — THIAMINE HCL TAB 100 MG 100 MG: 100 TAB at 08:22

## 2021-07-03 RX ADMIN — OXYCODONE HYDROCHLORIDE 5 MG: 5 TABLET ORAL at 08:21

## 2021-07-03 RX ADMIN — PANTOPRAZOLE SODIUM 40 MG: 40 TABLET, DELAYED RELEASE ORAL at 21:26

## 2021-07-03 ASSESSMENT — ACTIVITIES OF DAILY LIVING (ADL)
ADLS_ACUITY_SCORE: 14
ADLS_ACUITY_SCORE: 13

## 2021-07-03 NOTE — PLAN OF CARE
Paracentesis yesterday; 5000 mL removed. Pt forgetful; alert to self and place; unsure of the year. VSS on RA; lungs diminished. SBA; GB/walker. CIWAs were 5 and 4 for anxiety and hand tremors. Abd distended; pain controlled with PRN oxy. Leger in place; adequate output. No BM this shift. Tolerating low fat diet; 1500 mL fluid restriction; 1140 mL remaining for day. Expected discharge to TCU on 07/06/21 per SW note; continue to monitor

## 2021-07-03 NOTE — PLAN OF CARE
[id number]    Pt. A/O. Forgetful @ times. Alert to place/self.  CIWAs negative. Slight hand tremors. Abdominal pain managed with PRN oxycodone. LS diminished. Abdomen firm distended. PIV-SL. BS active.Leger-patent w/ adequate UOP.  Patient had bowel movement 7/2 during the day. Need stool sample. Low fat diet/1500ml FR. Plan: discharge to a TCU w/ CD services when medically stable.

## 2021-07-03 NOTE — PLAN OF CARE
Pt forgetful, alert to place/self, unsure of year. CIWAs negative. Pain to left ABD managed with PRN Oxycodone and Seroquel x1 for anxiety. LS dim, ABD firm distended, + fluid wave noted. New PIV w/ int iron. Leger with good UOP. BS acitve; no BM. Lactulose given. Low fat diet/1500ml FR. Paracetesis 5000ml removed. Plan: TCU w/ CD services when medically stable

## 2021-07-03 NOTE — PLAN OF CARE
Py A/O; forgetful. CIWA notable today for tremor and agitation;well managed with PRN Seroquel. Regular diet; 1500ml FR. Up SBA+walker. Left side ABD bruised from paracentesis 7/2. Leger with good UOP. Meatus is red, sore; vaseline applied for comfort. Good linda-care performed. Plan: Discharge 7/6 to TCU

## 2021-07-03 NOTE — PROGRESS NOTES
[facility]    Medicine Progress Note - Hospitalist Service       Date of Admission:  6/25/2021    Assessment & Plan   Jim Richard is a 66 year old male with hx of alcoholic cirrhosis with ascites and ongoing alcohol use who was admitted on 6/25/2021 for recurrent ascites. Hospital course complicated by alcohol withdrawal which has now resolved. He is under an active commitment     Alcoholic cirrhosis with recurrent ascities  * Patient missed his weekly paracentesis and presented with markedly distended abdomen with associated pain and respiratory distress  * Underwent large-volume paracentesis (>5L removed) on 6/26, 6/29, and 7/2. No evidence of SBP. He is not on diuretics due to propensity for NGUYEN  - Met with IR yesterday, but did not want to pursue PleurX catheter. Now agreeable. Will plan for Tues, 7/6  - On 1500 mL fluid restriction  - He has been initiated on lactulose 20g BID for goal 2-3 BM daily  - Continues on PTA midodrine    NGUYEN on CKD stage IV, Improved  Baseline creatinine 2-2.2, presumably due to hepatorenal syndrome. Creatinine up to 2.71 during this hospitalization. Nephrology was consulted, and he was treated with IV fluids and albumin   - Creatinine appears to have plateaued in 2.5-2.6 range  - Continues on PTA midodrine  - Nephrology following    Hypervolemic hyponatremia, Resolved  Na fabiola 127, resolved with fluid restriction  - On 1500 mL fluid restricution    Pancytopenia due to alcoholic cirrhosis  CBC has been stable with no s/sx of bleeding or infection during this hospitalization  CBC RESULTS:   CBC RESULTS:   Recent Labs   Lab Test 07/02/21  0816   WBC 3.5*   RBC 2.66*   HGB 8.2*   HCT 25.5*   MCV 96   MCH 30.8   MCHC 32.2   RDW 19.7*   *       Urinary retention  History of BPH  Coude catheter placed 6/29 for ongoing urinary retention  - Coude catheter in place, 6/29 to present  - Attempt voiding trial in the next few days  - He was initiated on  finasteride during this hospitalization  - Continues on PTA tamsulosin     Acute alcohol withdrawal, Improving  Alcohol dependence  Acutely intoxicated on arrival. BARNEY 0.34. Developed alcohol withdrawal following admission, and was treated with lorazepam per CIWA. Patient is apparently under active commitment that was recently renewed for another year.  - Scoring low on CIWA without need for lorazepam. d/c CIWA today, 7/3  - On thiamine/folate/MVI  - On K/Mg/Phos replacement protocols  - CD recommending TCU with CD services    Major recurrent depressive disorder with anxiety  * PTA: gabapentin 300 mg TID, mirtazapine 30 mg qhs, vortioxetine 10 mg BID, quetiapine 200 mg qhs and 50 mg TID prn, eszopiclone 3 mg qhs prn sleep]  - Continues on PTA meds, no adjustments per Psych during this admission     COPD  Chronic and stable on PTA inhalers    Nicotine dependence  Nicotine patch in place     JANIS  Continues on CPAP per home setting    Umbilical Hernia  Chronic and stable. Recently evaluated by General Surgery; no intervention recommended     Physical deconditioning in context of acute on chronic medical illness  - PT/OT recommending TCU     Diet: Fluid restriction 1500 ML FLUID  Snacks/Supplements Pediatric: Ensure Enlive; Between Meals  Combination Diet 2 gm NA Diet    DVT Prophylaxis: Pneumatic Compression Devices  Leger Catheter: PRESENT, indication: Retention  Code Status: No CPR- Do NOT Intubate      Disposition: Expected discharge pending PleurX catheter placement, Tues-Wed    The patient's care was discussed with the Care Coordinator/ and Patient.    Sara Orellana MD  Hospitalist Service  [facility]  Contact information available via [facility] Paging/Directory    ______________________________________________________________________    Interval History   No events overnight. Reports penile pain which has improved with wound care. Denies cp/sob, dizziness/lightheadedness.  Abdominal discomfort stable. He apparently declined PleurX yesterday after discussing with IR. Reviewed potential PleurX catheter again at length, now agreeable    Time Spent on this Encounter   I spent 35 minutes on the unit/floor managing the care of Jim Richard. Over 50% of my time was spent on the following:   - Counseling the patient and/or family regarding: risks and benefits of treatment options and prevention of disease  - Coordination of care with the: consultant(s) and care coordinator/    Sara Orellana MD    Data reviewed today: I reviewed all medications, new labs and imaging results over the last 24 hours. I personally reviewed no images or EKG's today.    Physical Exam   Vital Signs: Temp: 97  F (36.1  C) Temp src: Oral BP: 134/61 Pulse: 65   Resp: 16 SpO2: 96 % O2 Device: None (Room air)    Weight: 169 lbs 0 oz  Constitutional: Resting in bed, NAD  HEENT: Sclera white, MMM  Respiratory: Breathing non-labored. Lungs CTAB - no wheezes, crackles, or rhonchi  Cardiovascular: Heart RRR, no m/r/g. No pedal edema  GI: +BS, abd distended, soft, mild TTP throughout. No rebound/guarding  Skin/Integument: No rash  Musculoskeletal: Normal muscle bulk and tone  Neuro: Alert and appropriate, BADILLO  Psych: Calm and cooperative    Data   Recent Labs   Lab 07/03/21  0712 07/02/21  0816 07/01/21  1647 07/01/21  0730 06/27/21  0803 06/27/21  0803   WBC  --  3.5* 3.9* 3.3*   < > 3.9*   HGB  --  8.2* 7.8* 7.3*   < > 7.5*   MCV  --  96 94 95   < > 94   PLT  --  112* 112* 114*   < > 120*    137  --  140   < > 131*   POTASSIUM 4.1 3.9  --  4.3   < > 4.6   CHLORIDE 107 108  --  111*   < > 104   CO2 23 21  --  25   < > 21   BUN 32* 30  --  31*   < > 32*   CR 2.63* 2.57*  --  2.67*   < > 2.29*   ANIONGAP 6 8  --  4   < > 6   KEV 8.4* 8.8  --  8.3*   < > 8.1*   GLC 94 79  --  93   < > 122*   ALBUMIN  --  3.2*  --  3.1*   < > 2.5*   PROTTOTAL  --  6.6*  --   --   --  6.1*   BILITOTAL  --  0.9  --    --   --  1.1   ALKPHOS  --  185*  --   --   --  189*   ALT  --  22  --   --   --  21   AST  --  25  --   --   --  35    < > = values in this interval not displayed.         Recent Results (from the past 24 hour(s))   US Paracentesis    Narrative    US PARACENTESIS 7/2/2021 2:19 PM    CLINICAL HISTORY: HIGH VOLUME paracentesis with or without diagnostic  fluid analysis with labs to be drawn if ordered. Total paracentesis  volume as much as possible.    PROCEDURE: Informed consent obtained. Time out performed. The abdomen  was prepped and draped in a sterile fashion. 10 mL of 1% lidocaine was  infused into local soft tissues. A 5 [place] catheter system was  introduced into the abdominal ascites under ultrasound guidance.    3.8 liters of straw-colored fluid was removed and sent to lab if  requested.    Patient tolerated procedure well.    Ultrasound imaging was obtained and placed in the patient's permanent  medical record.      Impression    IMPRESSION:  1.  Status post ultrasound-guided paracentesis.    ANNE-MARIE PATEL MD          SYSTEM ID:  [id number]       Medications     - MEDICATION INSTRUCTIONS -       - MEDICATION INSTRUCTIONS -         finasteride  5 mg Oral Daily     folic acid  1 mg Oral Daily     gabapentin  300 mg Oral TID     iron sucrose (VENOFER) intermittent infusion  200 mg Intravenous Daily     lactulose  20 g Oral BID     magnesium oxide  400 mg Oral Daily     miconazole   Topical BID     midodrine  10 mg Oral TID w/meals     mirtazapine  30 mg Oral At Bedtime     multivitamin w/minerals  1 tablet Oral Daily     nicotine  1 patch Transdermal Q24H     nicotine   Transdermal Q8H     pantoprazole  40 mg Oral BID     QUEtiapine  200 mg Oral At Bedtime     sodium chloride (PF)  3 mL Intracatheter Q8H     tamsulosin  0.8 mg Oral Daily     thiamine  100 mg Oral Daily     traZODone  100 mg Oral At Bedtime     vortioxetine  10 mg Oral BID

## 2021-07-04 LAB
ALBUMIN SERPL-MCNC: 2.9 G/DL (ref 3.4–5)
ALP SERPL-CCNC: 170 U/L (ref 40–150)
ALT SERPL W P-5'-P-CCNC: 22 U/L (ref 0–70)
ANION GAP SERPL CALCULATED.3IONS-SCNC: 8 MMOL/L (ref 3–14)
AST SERPL W P-5'-P-CCNC: 26 U/L (ref 0–45)
BACTERIA SPEC CULT: NO GROWTH
BILIRUB SERPL-MCNC: 1 MG/DL (ref 0.2–1.3)
BUN SERPL-MCNC: 32 MG/DL (ref 7–30)
CALCIUM SERPL-MCNC: 8.2 MG/DL (ref 8.5–10.1)
CHLORIDE SERPL-SCNC: 107 MMOL/L (ref 94–109)
CO2 SERPL-SCNC: 21 MMOL/L (ref 20–32)
CREAT SERPL-MCNC: 2.58 MG/DL (ref 0.66–1.25)
ERYTHROCYTE [DISTWIDTH] IN BLOOD BY AUTOMATED COUNT: 19.3 % (ref 10–15)
GFR SERPL CREATININE-BSD FRML MDRD: 25 ML/MIN/{1.73_M2}
GLUCOSE SERPL-MCNC: 176 MG/DL (ref 70–99)
HCT VFR BLD AUTO: 25.7 % (ref 40–53)
HEMOCCULT STL QL: NEGATIVE
HGB BLD-MCNC: 8 G/DL (ref 13.3–17.7)
MCH RBC QN AUTO: 30.1 PG (ref 26.5–33)
MCHC RBC AUTO-ENTMCNC: 31.1 G/DL (ref 31.5–36.5)
MCV RBC AUTO: 97 FL (ref 78–100)
PLATELET # BLD AUTO: 129 10E9/L (ref 150–450)
POTASSIUM SERPL-SCNC: 4.1 MMOL/L (ref 3.4–5.3)
PROT SERPL-MCNC: 6.2 G/DL (ref 6.8–8.8)
RBC # BLD AUTO: 2.66 10E12/L (ref 4.4–5.9)
SODIUM SERPL-SCNC: 136 MMOL/L (ref 133–144)
SPECIMEN SOURCE: NORMAL
WBC # BLD AUTO: 3.5 10E9/L (ref 4–11)

## 2021-07-04 PROCEDURE — 36415 COLL VENOUS BLD VENIPUNCTURE: CPT | Performed by: INTERNAL MEDICINE

## 2021-07-04 PROCEDURE — 80053 COMPREHEN METABOLIC PANEL: CPT | Performed by: INTERNAL MEDICINE

## 2021-07-04 PROCEDURE — 99232 SBSQ HOSP IP/OBS MODERATE 35: CPT | Performed by: INTERNAL MEDICINE

## 2021-07-04 PROCEDURE — 85027 COMPLETE CBC AUTOMATED: CPT | Performed by: INTERNAL MEDICINE

## 2021-07-04 PROCEDURE — 82272 OCCULT BLD FECES 1-3 TESTS: CPT | Performed by: HOSPITALIST

## 2021-07-04 PROCEDURE — 120N000001 HC R&B MED SURG/OB

## 2021-07-04 PROCEDURE — 250N000013 HC RX MED GY IP 250 OP 250 PS 637: Performed by: INTERNAL MEDICINE

## 2021-07-04 PROCEDURE — 250N000013 HC RX MED GY IP 250 OP 250 PS 637: Performed by: HOSPITALIST

## 2021-07-04 RX ADMIN — OXYCODONE HYDROCHLORIDE 5 MG: 5 TABLET ORAL at 12:43

## 2021-07-04 RX ADMIN — QUETIAPINE FUMARATE 50 MG: 25 TABLET ORAL at 17:00

## 2021-07-04 RX ADMIN — TAMSULOSIN HYDROCHLORIDE 0.8 MG: 0.4 CAPSULE ORAL at 08:34

## 2021-07-04 RX ADMIN — QUETIAPINE 200 MG: 200 TABLET, FILM COATED ORAL at 22:06

## 2021-07-04 RX ADMIN — VORTIOXETINE 10 MG: 10 TABLET, FILM COATED ORAL at 22:07

## 2021-07-04 RX ADMIN — PANTOPRAZOLE SODIUM 40 MG: 40 TABLET, DELAYED RELEASE ORAL at 08:34

## 2021-07-04 RX ADMIN — VORTIOXETINE 10 MG: 10 TABLET, FILM COATED ORAL at 08:33

## 2021-07-04 RX ADMIN — GABAPENTIN 300 MG: 300 CAPSULE ORAL at 17:00

## 2021-07-04 RX ADMIN — PANTOPRAZOLE SODIUM 40 MG: 40 TABLET, DELAYED RELEASE ORAL at 22:06

## 2021-07-04 RX ADMIN — TRAZODONE HYDROCHLORIDE 100 MG: 100 TABLET ORAL at 22:06

## 2021-07-04 RX ADMIN — MIDODRINE HYDROCHLORIDE 10 MG: 5 TABLET ORAL at 17:06

## 2021-07-04 RX ADMIN — LACTULOSE 20 G: 10 SOLUTION ORAL at 08:33

## 2021-07-04 RX ADMIN — FOLIC ACID 1 MG: 1 TABLET ORAL at 08:34

## 2021-07-04 RX ADMIN — QUETIAPINE FUMARATE 50 MG: 25 TABLET ORAL at 08:33

## 2021-07-04 RX ADMIN — LACTULOSE 20 G: 10 SOLUTION ORAL at 21:59

## 2021-07-04 RX ADMIN — MULTIPLE VITAMINS W/ MINERALS TAB 1 TABLET: TAB at 08:33

## 2021-07-04 RX ADMIN — THIAMINE HCL TAB 100 MG 100 MG: 100 TAB at 08:33

## 2021-07-04 RX ADMIN — MAGNESIUM OXIDE TAB 400 MG (241.3 MG ELEMENTAL MG) 400 MG: 400 (241.3 MG) TAB at 08:33

## 2021-07-04 RX ADMIN — MIRTAZAPINE 30 MG: 15 TABLET, FILM COATED ORAL at 22:06

## 2021-07-04 RX ADMIN — MIDODRINE HYDROCHLORIDE 10 MG: 5 TABLET ORAL at 08:33

## 2021-07-04 RX ADMIN — GABAPENTIN 300 MG: 300 CAPSULE ORAL at 22:06

## 2021-07-04 RX ADMIN — FINASTERIDE 5 MG: 5 TABLET, FILM COATED ORAL at 08:33

## 2021-07-04 RX ADMIN — MICONAZOLE NITRATE: 20 POWDER TOPICAL at 08:37

## 2021-07-04 RX ADMIN — MIDODRINE HYDROCHLORIDE 10 MG: 5 TABLET ORAL at 12:43

## 2021-07-04 RX ADMIN — NICOTINE 1 PATCH: 21 PATCH, EXTENDED RELEASE TRANSDERMAL at 22:05

## 2021-07-04 RX ADMIN — GABAPENTIN 300 MG: 300 CAPSULE ORAL at 08:34

## 2021-07-04 ASSESSMENT — MIFFLIN-ST. JEOR: SCORE: 1505.65

## 2021-07-04 ASSESSMENT — ACTIVITIES OF DAILY LIVING (ADL)
ADLS_ACUITY_SCORE: 14
ADLS_ACUITY_SCORE: 16
ADLS_ACUITY_SCORE: 14

## 2021-07-04 NOTE — PLAN OF CARE
A/O x4. VSS on RA. Denied pain this shift. CIWA-negative. Leger patent; with good UOP. Diet: 2g NA with 1500 ml fluid restriction. Meatus is red/sore. Bruising to L-abdomen from paracentesis. R-abdomen paracentesis site covered with bandaid. Nicotine patch in place; R upper arm.  Anticipate discharge to TCU on 7/6/21

## 2021-07-04 NOTE — PLAN OF CARE
Pt A/O but forgetful. C/o pain at meatus-site is red/sore; good linda-care and vaseline applied. Oxycodone x1 w/ relief. Seroquel for tremors and anxiety. ABD distended; Left ABD bruised at paracentesis site. Sacral area covered with Mepilex-prevention. Encourage patient to shift weight-as he decline T/R Q2h. Nicotine patch to R shoulder. Plan: Plurex drain placement. Discharge to TCU-pt on commitment.

## 2021-07-04 NOTE — PLAN OF CARE
A/Ox4. VSS ex slightly elevated BP, on RA. C/o 8/10 pain to groin and abdomen, PRN oxy given x1. Patient anxious, requested seroquel, PRN seroquel given x1. Up SBA GB/W. 2 g Na diet. 1500 ml fluid restriction. Leger patent with good UOP. Meatus is red and sore. Bruising to L abdomen from paracentesis. R abdomen paracentesis site covered with bandaid, CDI. CIWA 8 and 1 and 4. Nicotine patch in place on R upper arm. Anticipate discharge to TCU on 7/6/21.

## 2021-07-04 NOTE — PROGRESS NOTES
[facility]    Medicine Progress Note - Hospitalist Service       Date of Admission:  6/25/2021    Assessment & Plan   Jim Richard is a 66 year old male with hx of alcoholic cirrhosis with ascites and ongoing alcohol use who was admitted on 6/25/2021 for recurrent ascites. Hospital course complicated by alcohol withdrawal which has now resolved. He is under an active commitment     Alcoholic cirrhosis with recurrent ascities  * Patient missed his weekly paracentesis and presented with markedly distended abdomen with associated pain and respiratory distress  * Underwent large-volume paracentesis (>5L removed) on 6/26, 6/29, and 7/2. No evidence of SBP. He is not on diuretics due to propensity for NGUYEN  - Now agreeable to tunneled peritoneal catheter. Will tentatively plan for tomorrow, 7/5  - On 1500 mL fluid restriction  - He has been initiated on lactulose 20g BID for goal 2-3 BM daily  - Continues on PTA midodrine    NGUYEN on CKD stage IV, Improved  Baseline creatinine 2-2.2, presumably due to hepatorenal syndrome. Creatinine up to 2.71 during this hospitalization. Nephrology was consulted, and he was treated with IV fluids and albumin   - Creatinine appears to have plateaued in 2.5-2.6 range  - Continues on PTA midodrine  - Nephrology following    Hypervolemic hyponatremia, Resolved  Na fabiola 127, resolved with fluid restriction  - On 1500 mL fluid restricution    Pancytopenia due to alcoholic cirrhosis  CBC has been stable with no s/sx of bleeding or infection during this hospitalization  CBC RESULTS:   Recent Labs   Lab Test 07/04/21  0809   WBC 3.5*   RBC 2.66*   HGB 8.0*   HCT 25.7*   MCV 97   MCH 30.1   MCHC 31.1*   RDW 19.3*   *       Urinary retention  History of BPH  Coude catheter placed 6/29 for ongoing urinary retention  - Coude catheter in place, 6/29 to present  - Attempt voiding trial in the next few days  - He was initiated on finasteride during this  hospitalization  - Continues on PTA tamsulosin     Acute alcohol withdrawal, Improving  Alcohol dependence  Acutely intoxicated on arrival. BARNEY 0.34. Developed alcohol withdrawal following admission, and was treated with lorazepam per SHAUN. Patient is apparently under active commitment that was recently renewed for another year.  - On thiamine/folate/MVI  - On K/Mg/Phos replacement protocols  - CD recommending TCU with CD services    Major recurrent depressive disorder with anxiety  * PTA: gabapentin 300 mg TID, mirtazapine 30 mg qhs, vortioxetine 10 mg BID, quetiapine 200 mg qhs and 50 mg TID prn, eszopiclone 3 mg qhs prn sleep]  - Continues on PTA meds, no adjustments per Psych during this admission     COPD  Chronic and stable on PTA inhalers    Nicotine dependence  Nicotine patch in place     JANIS  Chronic and stable on CPAP    Umbilical Hernia  Chronic and stable. Recently evaluated by General Surgery; no intervention recommended     Physical deconditioning in context of acute on chronic medical illness  - PT/OT recommending TCU     Diet: Fluid restriction 1500 ML FLUID  Snacks/Supplements Pediatric: Ensure Enlive; Between Meals  Combination Diet 2 gm NA Diet    DVT Prophylaxis: Pneumatic Compression Devices  Leger Catheter: PRESENT, indication: Retention  Code Status: No CPR- Do NOT Intubate      Disposition: Expected discharge pending PleurX catheter placement, Tues-Wed    The patient's care was discussed with the Patient.    Sara Orellana MD  Hospitalist Service  [facility]  Contact information available via [facility] Paging/Directory    ______________________________________________________________________    Interval History   No events overnight. Abdominal discomfort stable. Awaiting PleurX catheter placement, hopefully tomorrow    Sara Orellana MD    Data reviewed today: I reviewed all medications, new labs and imaging results over the last 24 hours. I personally  reviewed no images or EKG's today.    Physical Exam   Vital Signs: Temp: 97.4  F (36.3  C) Temp src: Oral BP: 124/52 Pulse: 67   Resp: 16 SpO2: 98 % O2 Device: None (Room air)    Weight: 170 lbs 3.2 oz  Constitutional: Resting in bed, NAD  HEENT: Sclera white, MMM  Respiratory: Breathing non-labored. Lungs CTAB - no wheezes, crackles, or rhonchi  Cardiovascular: Heart RRR, no m/r/g. No pedal edema  GI: +BS, abd distended, soft, mild TTP throughout. No rebound/guarding  Skin/Integument: No rash  Musculoskeletal: Normal muscle bulk and tone  Neuro: Alert and appropriate, BADILLO  Psych: Calm and cooperative    Data   Recent Labs   Lab 07/04/21  0809 07/03/21  0712 07/02/21  0816 07/01/21  1647   WBC 3.5*  --  3.5* 3.9*   HGB 8.0*  --  8.2* 7.8*   MCV 97  --  96 94   *  --  112* 112*    136 137  --    POTASSIUM 4.1 4.1 3.9  --    CHLORIDE 107 107 108  --    CO2 21 23 21  --    BUN 32* 32* 30  --    CR 2.58* 2.63* 2.57*  --    ANIONGAP 8 6 8  --    KEV 8.2* 8.4* 8.8  --    * 94 79  --    ALBUMIN 2.9*  --  3.2*  --    PROTTOTAL 6.2*  --  6.6*  --    BILITOTAL 1.0  --  0.9  --    ALKPHOS 170*  --  185*  --    ALT 22  --  22  --    AST 26  --  25  --          No results found for this or any previous visit (from the past 24 hour(s)).    Medications     - MEDICATION INSTRUCTIONS -       - MEDICATION INSTRUCTIONS -         finasteride  5 mg Oral Daily     folic acid  1 mg Oral Daily     gabapentin  300 mg Oral TID     lactulose  20 g Oral BID     magnesium oxide  400 mg Oral Daily     miconazole   Topical BID     midodrine  10 mg Oral TID w/meals     mirtazapine  30 mg Oral At Bedtime     multivitamin w/minerals  1 tablet Oral Daily     nicotine  1 patch Transdermal Q24H     nicotine   Transdermal Q8H     pantoprazole  40 mg Oral BID     QUEtiapine  200 mg Oral At Bedtime     sodium chloride (PF)  3 mL Intracatheter Q8H     tamsulosin  0.8 mg Oral Daily     thiamine  100 mg Oral Daily     traZODone  100 mg  Oral At Bedtime     vortioxetine  10 mg Oral BID

## 2021-07-05 ENCOUNTER — APPOINTMENT (OUTPATIENT)
Dept: OCCUPATIONAL THERAPY | Facility: CLINIC | Age: 66
DRG: 432 | End: 2021-07-05
Payer: MEDICARE

## 2021-07-05 LAB — COPATH REPORT: NORMAL

## 2021-07-05 PROCEDURE — 97535 SELF CARE MNGMENT TRAINING: CPT | Mod: GO | Performed by: OCCUPATIONAL THERAPIST

## 2021-07-05 PROCEDURE — 99233 SBSQ HOSP IP/OBS HIGH 50: CPT | Performed by: INTERNAL MEDICINE

## 2021-07-05 PROCEDURE — 250N000013 HC RX MED GY IP 250 OP 250 PS 637: Performed by: INTERNAL MEDICINE

## 2021-07-05 PROCEDURE — 97530 THERAPEUTIC ACTIVITIES: CPT | Mod: GO | Performed by: OCCUPATIONAL THERAPIST

## 2021-07-05 PROCEDURE — 250N000013 HC RX MED GY IP 250 OP 250 PS 637: Performed by: HOSPITALIST

## 2021-07-05 PROCEDURE — 120N000001 HC R&B MED SURG/OB

## 2021-07-05 RX ADMIN — TRAZODONE HYDROCHLORIDE 100 MG: 100 TABLET ORAL at 21:37

## 2021-07-05 RX ADMIN — MIDODRINE HYDROCHLORIDE 10 MG: 5 TABLET ORAL at 18:40

## 2021-07-05 RX ADMIN — GABAPENTIN 300 MG: 300 CAPSULE ORAL at 16:08

## 2021-07-05 RX ADMIN — MIDODRINE HYDROCHLORIDE 10 MG: 5 TABLET ORAL at 12:45

## 2021-07-05 RX ADMIN — TAMSULOSIN HYDROCHLORIDE 0.8 MG: 0.4 CAPSULE ORAL at 08:18

## 2021-07-05 RX ADMIN — QUETIAPINE 200 MG: 200 TABLET, FILM COATED ORAL at 21:37

## 2021-07-05 RX ADMIN — FOLIC ACID 1 MG: 1 TABLET ORAL at 08:18

## 2021-07-05 RX ADMIN — QUETIAPINE FUMARATE 50 MG: 25 TABLET ORAL at 09:58

## 2021-07-05 RX ADMIN — NICOTINE 1 PATCH: 21 PATCH, EXTENDED RELEASE TRANSDERMAL at 22:32

## 2021-07-05 RX ADMIN — PANTOPRAZOLE SODIUM 40 MG: 40 TABLET, DELAYED RELEASE ORAL at 20:37

## 2021-07-05 RX ADMIN — MIRTAZAPINE 30 MG: 15 TABLET, FILM COATED ORAL at 21:37

## 2021-07-05 RX ADMIN — GABAPENTIN 300 MG: 300 CAPSULE ORAL at 21:37

## 2021-07-05 RX ADMIN — THIAMINE HCL TAB 100 MG 100 MG: 100 TAB at 08:18

## 2021-07-05 RX ADMIN — GABAPENTIN 300 MG: 300 CAPSULE ORAL at 08:18

## 2021-07-05 RX ADMIN — MULTIPLE VITAMINS W/ MINERALS TAB 1 TABLET: TAB at 08:18

## 2021-07-05 RX ADMIN — MAGNESIUM OXIDE TAB 400 MG (241.3 MG ELEMENTAL MG) 400 MG: 400 (241.3 MG) TAB at 08:18

## 2021-07-05 RX ADMIN — QUETIAPINE FUMARATE 50 MG: 25 TABLET ORAL at 16:08

## 2021-07-05 RX ADMIN — LACTULOSE 20 G: 10 SOLUTION ORAL at 21:37

## 2021-07-05 RX ADMIN — MIDODRINE HYDROCHLORIDE 10 MG: 5 TABLET ORAL at 08:18

## 2021-07-05 RX ADMIN — PANTOPRAZOLE SODIUM 40 MG: 40 TABLET, DELAYED RELEASE ORAL at 08:18

## 2021-07-05 RX ADMIN — VORTIOXETINE 10 MG: 10 TABLET, FILM COATED ORAL at 08:18

## 2021-07-05 RX ADMIN — VORTIOXETINE 10 MG: 10 TABLET, FILM COATED ORAL at 20:37

## 2021-07-05 RX ADMIN — FINASTERIDE 5 MG: 5 TABLET, FILM COATED ORAL at 08:18

## 2021-07-05 ASSESSMENT — ACTIVITIES OF DAILY LIVING (ADL)
ADLS_ACUITY_SCORE: 14
ADLS_ACUITY_SCORE: 16
ADLS_ACUITY_SCORE: 14
ADLS_ACUITY_SCORE: 14

## 2021-07-05 NOTE — PROVIDER NOTIFICATION
MD Notification    Notified Person: MD    Notified Person Name: Esteban     Notification Date/Time: 0835    Notification Interaction: Paged    Purpose of Notification: Pt kept NPO after midnight in hopes of tunneled peritoneal cath today, status update on procedure/keep  NPO? Thanks, Aga RN *[phone]    Orders Received: Per Dr. Orellana in person, please contact IR for status of procedure, keep pt NPO  Per IR, need new order placed for procedure, contacted Dr. Orellana to make sure pt status NPO ex meds, already gave morning meds     Comments:

## 2021-07-05 NOTE — PROGRESS NOTES
[facility]    Medicine Progress Note - Hospitalist Service       Date of Admission:  6/25/2021    Assessment & Plan   Jim Richard is a 66 year old male with hx of alcoholic cirrhosis with ascites and ongoing alcohol use who was admitted on 6/25/2021 for recurrent ascites. Hospital course complicated by alcohol withdrawal which has now resolved. He is under an active commitment     Alcoholic cirrhosis with recurrent ascities  * Patient missed his weekly paracentesis and presented with markedly distended abdomen with associated pain and respiratory distress  * Underwent large-volume paracentesis (>5L removed) on 6/26, 6/29, and 7/2. No evidence of SBP. He is not on diuretics due to propensity for NGUYEN  * Tunneled peritoneal catheter was considered, but upon further discussion with IR was not felt to be a good candidate. Patient is currently under active commitment, but has longstanding history of non-compliance. Because it is unclear how long patient will be under commitment, decision was made to defer PleurX catheter placement  - Continues on therapeutic paracentesis prn  - On 1500 mL fluid restriction  - He has been initiated on lactulose 20g BID for goal 2-3 BM daily  - Continues on PTA midodrine    NGUYEN on CKD stage IV, Improved  Baseline creatinine 2-2.2, presumably due to hepatorenal syndrome. Creatinine up to 2.71 during this hospitalization. Nephrology was consulted, and he was treated with IV fluids and albumin   - Creatinine appears to have plateaued in 2.5-2.6 range  - Continues on PTA midodrine  - Nephrology following    Hypervolemic hyponatremia, Resolved  Na fabiola 127, resolved with fluid restriction  - On 1500 mL fluid restricution    Pancytopenia due to alcoholic cirrhosis  CBC has been stable with no s/sx of bleeding or infection during this hospitalization  CBC RESULTS:   Recent Labs   Lab Test 07/04/21  0809   WBC 3.5*   RBC 2.66*   HGB 8.0*   HCT 25.7*   MCV 97   MCH  30.1   MCHC 31.1*   RDW 19.3*   *       Urinary retention  History of BPH  Coude catheter placed 6/29 for ongoing urinary retention  - Coude catheter in place, 6/29 to present  - Attempt voiding trial in the next few days  - He was initiated on finasteride during this hospitalization  - Continues on PTA tamsulosin     Acute alcohol withdrawal, Improving  Alcohol dependence  Acutely intoxicated on arrival. BARNEY 0.34. Developed alcohol withdrawal following admission, and was treated with lorazepam per CIWA. Patient is apparently under active commitment that was recently renewed for another year.  - On thiamine/folate/MVI  - On K/Mg/Phos replacement protocols  - CD recommending TCU with CD services    Major recurrent depressive disorder with anxiety  * PTA: gabapentin 300 mg TID, mirtazapine 30 mg qhs, vortioxetine 10 mg BID, quetiapine 200 mg qhs and 50 mg TID prn, eszopiclone 3 mg qhs prn sleep]  - Continues on PTA meds, no adjustments per Psych during this admission     COPD  Chronic and stable on PTA inhalers    Nicotine dependence  Nicotine patch in place     JANIS  Chronic and stable on CPAP    Umbilical Hernia  Chronic and stable. Recently evaluated by General Surgery; no intervention recommended     Physical deconditioning in context of acute on chronic medical illness  - PT/OT recommending TCU     Diet: Fluid restriction 1500 ML FLUID  Snacks/Supplements Pediatric: Ensure Enlive; Between Meals  2 Gram Sodium Diet    DVT Prophylaxis: Pneumatic Compression Devices  Leger Catheter: PRESENT, indication: Retention  Code Status: No CPR- Do NOT Intubate      Disposition: Expected discharge to TCU following 1 more paracentesis, possibly tomorrow    The patient's care was discussed with the Bedside Nurse, Care Coordinator/ and Patient.    Sara Orellana MD  Hospitalist Service  [facility]  Contact information available via [facility]  Christy/y    ______________________________________________________________________    Interval History   No events overnight. Abdominal discomfort stable, does not feel particularly distended. Agreeable to PleurX catheter placement, but upon further discussion with IR, he was not felt to be a good candidate    Time Spent on this Encounter   I spent 35 minutes on the unit/floor managing the care of Jim Richard. Over 50% of my time was spent on the following:   - Counseling the patient and/or family regarding: risks and benefits of treatment options  - Coordination of care with the: consultant(s), care coordinator/ and nurse    Sara Orellana MD    Data reviewed today: I reviewed all medications, new labs and imaging results over the last 24 hours. I personally reviewed no images or EKG's today.    Physical Exam   Vital Signs: Temp: 96.8  F (36  C) Temp src: Oral BP: 114/56 Pulse: 56   Resp: 12 SpO2: 96 % O2 Device: None (Room air)    Weight: 170 lbs 3.2 oz  Constitutional: Resting in bed, NAD  HEENT: Sclera white, MMM  Respiratory: Breathing non-labored. Lungs CTAB - no wheezes, crackles, or rhonchi  Cardiovascular: Heart RRR, no m/r/g. No pedal edema  GI: +BS, abd distended, soft, mild TTP throughout. No rebound/guarding  Skin/Integument: No rash  Musculoskeletal: Normal muscle bulk and tone  Neuro: Alert and appropriate, BADILLO  Psych: Calm and cooperative    Data   Recent Labs   Lab 07/04/21  0809 07/03/21  0712 07/02/21  0816 07/01/21  1647   WBC 3.5*  --  3.5* 3.9*   HGB 8.0*  --  8.2* 7.8*   MCV 97  --  96 94   *  --  112* 112*    136 137  --    POTASSIUM 4.1 4.1 3.9  --    CHLORIDE 107 107 108  --    CO2 21 23 21  --    BUN 32* 32* 30  --    CR 2.58* 2.63* 2.57*  --    ANIONGAP 8 6 8  --    KEV 8.2* 8.4* 8.8  --    * 94 79  --    ALBUMIN 2.9*  --  3.2*  --    PROTTOTAL 6.2*  --  6.6*  --    BILITOTAL 1.0  --  0.9  --    ALKPHOS 170*  --  185*  --    ALT 22  --   22  --    AST 26  --  25  --          No results found for this or any previous visit (from the past 24 hour(s)).    Medications     - MEDICATION INSTRUCTIONS -       - MEDICATION INSTRUCTIONS -       - MEDICATION INSTRUCTIONS -       sodium chloride 0.9%         finasteride  5 mg Oral Daily     folic acid  1 mg Oral Daily     gabapentin  300 mg Oral TID     lactulose  20 g Oral BID     magnesium oxide  400 mg Oral Daily     miconazole   Topical BID     midodrine  10 mg Oral TID w/meals     mirtazapine  30 mg Oral At Bedtime     multivitamin w/minerals  1 tablet Oral Daily     nicotine  1 patch Transdermal Q24H     nicotine   Transdermal Q8H     pantoprazole  40 mg Oral BID     QUEtiapine  200 mg Oral At Bedtime     sodium chloride (PF)  3 mL Intracatheter Q8H     tamsulosin  0.8 mg Oral Daily     thiamine  100 mg Oral Daily     traZODone  100 mg Oral At Bedtime     vortioxetine  10 mg Oral BID

## 2021-07-05 NOTE — PLAN OF CARE
A&Ox4. VSS on RA. Denied pain. PRN seroquel given x1 per pt request. Leger with good urine output; meatus red/sore. Mepilex to coccyx. 2 g Na diet with 1500 mL fluid restriction after procedure for tunneled peritoneal catheter cancelled today. Bruising to abdomen from paracentesis x3 recently. Nicotine patch L shoulder. Likely discharge to TCU tomorrow, may need paracentesis prior to discharge. Pt is on commitment.

## 2021-07-05 NOTE — PLAN OF CARE
A/O x4. VSS on RA. Denied pain this shift.  Leger patent; with good UOP. Mepilex to sacrum-preventative. Encourage patient to shift weight. Diet: NPO for possible placement of a tunneled peritoneal catheter- before NPO diet was 2g NA w/ 1500 ml fluid restriction. Meatus is red/sore. Bruising to L-abdomen from paracentesis. R-abdomen paracentesis site covered with bandaid. Nicotine patch in place; L upper arm.  Anticipate discharge to TCU on 7/6/21. Patient on commitment.

## 2021-07-06 ENCOUNTER — APPOINTMENT (OUTPATIENT)
Dept: OCCUPATIONAL THERAPY | Facility: CLINIC | Age: 66
DRG: 432 | End: 2021-07-06
Payer: MEDICARE

## 2021-07-06 LAB
ALBUMIN SERPL-MCNC: 2.9 G/DL (ref 3.4–5)
ALP SERPL-CCNC: 181 U/L (ref 40–150)
ALT SERPL W P-5'-P-CCNC: 20 U/L (ref 0–70)
ANION GAP SERPL CALCULATED.3IONS-SCNC: 8 MMOL/L (ref 3–14)
AST SERPL W P-5'-P-CCNC: 21 U/L (ref 0–45)
BACTERIA SPEC CULT: NORMAL
BILIRUB SERPL-MCNC: 0.5 MG/DL (ref 0.2–1.3)
BUN SERPL-MCNC: 34 MG/DL (ref 7–30)
CALCIUM SERPL-MCNC: 8.5 MG/DL (ref 8.5–10.1)
CHLORIDE SERPL-SCNC: 104 MMOL/L (ref 94–109)
CO2 SERPL-SCNC: 23 MMOL/L (ref 20–32)
CREAT SERPL-MCNC: 2.81 MG/DL (ref 0.66–1.25)
ERYTHROCYTE [DISTWIDTH] IN BLOOD BY AUTOMATED COUNT: 17.8 % (ref 10–15)
GFR SERPL CREATININE-BSD FRML MDRD: 22 ML/MIN/{1.73_M2}
GLUCOSE SERPL-MCNC: 88 MG/DL (ref 70–99)
HCT VFR BLD AUTO: 26.3 % (ref 40–53)
HGB BLD-MCNC: 8.4 G/DL (ref 13.3–17.7)
Lab: NORMAL
MCH RBC QN AUTO: 29.8 PG (ref 26.5–33)
MCHC RBC AUTO-ENTMCNC: 31.9 G/DL (ref 31.5–36.5)
MCV RBC AUTO: 93 FL (ref 78–100)
PLATELET # BLD AUTO: 134 10E9/L (ref 150–450)
POTASSIUM SERPL-SCNC: 4 MMOL/L (ref 3.4–5.3)
PROT SERPL-MCNC: 6.3 G/DL (ref 6.8–8.8)
RBC # BLD AUTO: 2.82 10E12/L (ref 4.4–5.9)
SODIUM SERPL-SCNC: 135 MMOL/L (ref 133–144)
SPECIMEN SOURCE: NORMAL
WBC # BLD AUTO: 2.9 10E9/L (ref 4–11)

## 2021-07-06 PROCEDURE — 250N000011 HC RX IP 250 OP 636: Performed by: HOSPITALIST

## 2021-07-06 PROCEDURE — 120N000001 HC R&B MED SURG/OB

## 2021-07-06 PROCEDURE — 80053 COMPREHEN METABOLIC PANEL: CPT | Performed by: INTERNAL MEDICINE

## 2021-07-06 PROCEDURE — 250N000013 HC RX MED GY IP 250 OP 250 PS 637: Performed by: HOSPITALIST

## 2021-07-06 PROCEDURE — 85027 COMPLETE CBC AUTOMATED: CPT | Performed by: INTERNAL MEDICINE

## 2021-07-06 PROCEDURE — 99233 SBSQ HOSP IP/OBS HIGH 50: CPT | Performed by: INTERNAL MEDICINE

## 2021-07-06 PROCEDURE — 97535 SELF CARE MNGMENT TRAINING: CPT | Mod: GO | Performed by: OCCUPATIONAL THERAPIST

## 2021-07-06 PROCEDURE — 99232 SBSQ HOSP IP/OBS MODERATE 35: CPT | Performed by: INTERNAL MEDICINE

## 2021-07-06 PROCEDURE — P9047 ALBUMIN (HUMAN), 25%, 50ML: HCPCS | Performed by: INTERNAL MEDICINE

## 2021-07-06 PROCEDURE — 250N000011 HC RX IP 250 OP 636: Performed by: INTERNAL MEDICINE

## 2021-07-06 PROCEDURE — 250N000013 HC RX MED GY IP 250 OP 250 PS 637: Performed by: INTERNAL MEDICINE

## 2021-07-06 PROCEDURE — 36415 COLL VENOUS BLD VENIPUNCTURE: CPT | Performed by: INTERNAL MEDICINE

## 2021-07-06 RX ORDER — MIDODRINE HYDROCHLORIDE 10 MG/1
10 TABLET ORAL
Status: CANCELLED | DISCHARGE
Start: 2021-07-06

## 2021-07-06 RX ORDER — LACTULOSE 10 G/15ML
20 SOLUTION ORAL 2 TIMES DAILY
Status: CANCELLED | DISCHARGE
Start: 2021-07-06

## 2021-07-06 RX ORDER — ALBUMIN (HUMAN) 12.5 G/50ML
12.5 SOLUTION INTRAVENOUS EVERY 8 HOURS
Status: COMPLETED | OUTPATIENT
Start: 2021-07-06 | End: 2021-07-07

## 2021-07-06 RX ADMIN — MULTIPLE VITAMINS W/ MINERALS TAB 1 TABLET: TAB at 09:56

## 2021-07-06 RX ADMIN — GABAPENTIN 300 MG: 300 CAPSULE ORAL at 15:49

## 2021-07-06 RX ADMIN — QUETIAPINE 200 MG: 200 TABLET, FILM COATED ORAL at 22:02

## 2021-07-06 RX ADMIN — VORTIOXETINE 10 MG: 10 TABLET, FILM COATED ORAL at 22:01

## 2021-07-06 RX ADMIN — THIAMINE HCL TAB 100 MG 100 MG: 100 TAB at 09:56

## 2021-07-06 RX ADMIN — LACTULOSE 20 G: 10 SOLUTION ORAL at 22:17

## 2021-07-06 RX ADMIN — TAMSULOSIN HYDROCHLORIDE 0.8 MG: 0.4 CAPSULE ORAL at 09:56

## 2021-07-06 RX ADMIN — QUETIAPINE FUMARATE 50 MG: 25 TABLET ORAL at 09:11

## 2021-07-06 RX ADMIN — FOLIC ACID 1 MG: 1 TABLET ORAL at 09:57

## 2021-07-06 RX ADMIN — NICOTINE 1 PATCH: 21 PATCH, EXTENDED RELEASE TRANSDERMAL at 22:17

## 2021-07-06 RX ADMIN — MELATONIN 5 MG TABLET 5 MG: at 01:41

## 2021-07-06 RX ADMIN — MAGNESIUM OXIDE TAB 400 MG (241.3 MG ELEMENTAL MG) 400 MG: 400 (241.3 MG) TAB at 09:56

## 2021-07-06 RX ADMIN — ONDANSETRON 4 MG: 2 INJECTION INTRAMUSCULAR; INTRAVENOUS at 21:01

## 2021-07-06 RX ADMIN — QUETIAPINE FUMARATE 50 MG: 25 TABLET ORAL at 15:49

## 2021-07-06 RX ADMIN — MICONAZOLE NITRATE: 20 POWDER TOPICAL at 22:04

## 2021-07-06 RX ADMIN — VORTIOXETINE 10 MG: 10 TABLET, FILM COATED ORAL at 09:56

## 2021-07-06 RX ADMIN — MIRTAZAPINE 30 MG: 15 TABLET, FILM COATED ORAL at 22:02

## 2021-07-06 RX ADMIN — MIDODRINE HYDROCHLORIDE 10 MG: 5 TABLET ORAL at 15:20

## 2021-07-06 RX ADMIN — MICONAZOLE NITRATE: 20 POWDER TOPICAL at 15:21

## 2021-07-06 RX ADMIN — TRAZODONE HYDROCHLORIDE 100 MG: 100 TABLET ORAL at 22:02

## 2021-07-06 RX ADMIN — GABAPENTIN 300 MG: 300 CAPSULE ORAL at 22:02

## 2021-07-06 RX ADMIN — FINASTERIDE 5 MG: 5 TABLET, FILM COATED ORAL at 09:56

## 2021-07-06 RX ADMIN — ALBUMIN HUMAN 12.5 G: 0.25 SOLUTION INTRAVENOUS at 15:37

## 2021-07-06 RX ADMIN — ALBUMIN HUMAN 12.5 G: 0.25 SOLUTION INTRAVENOUS at 15:52

## 2021-07-06 RX ADMIN — LACTULOSE 20 G: 10 SOLUTION ORAL at 09:57

## 2021-07-06 RX ADMIN — OXYCODONE HYDROCHLORIDE 5 MG: 5 TABLET ORAL at 18:23

## 2021-07-06 RX ADMIN — PANTOPRAZOLE SODIUM 40 MG: 40 TABLET, DELAYED RELEASE ORAL at 22:02

## 2021-07-06 RX ADMIN — MIDODRINE HYDROCHLORIDE 10 MG: 5 TABLET ORAL at 18:22

## 2021-07-06 RX ADMIN — GABAPENTIN 300 MG: 300 CAPSULE ORAL at 09:57

## 2021-07-06 RX ADMIN — PANTOPRAZOLE SODIUM 40 MG: 40 TABLET, DELAYED RELEASE ORAL at 09:57

## 2021-07-06 RX ADMIN — MIDODRINE HYDROCHLORIDE 10 MG: 5 TABLET ORAL at 09:57

## 2021-07-06 ASSESSMENT — ACTIVITIES OF DAILY LIVING (ADL)
ADLS_ACUITY_SCORE: 13

## 2021-07-06 NOTE — PLAN OF CARE
A&Ox4. VSS on RA. Denies pain/nausea. PRN Seroquel given x1 for anxiety. 2g Na diet w/ 1500ml fluid restriction. Bruising to abdomen from paracentesis x3 this admission. Abdomen firm & distended. SBA w/ walker to ambulate, steady. Nicotine patch to R shoulder. Leger w/ good UOP, will have voiding trial prior to discharge. +2 Swelling to L leg, per patient this is normal (no pain/redness to site). SBA +gb/w to ambulate. Plan for paracentesis tomorrow & discharge to TCU once placement found.

## 2021-07-06 NOTE — PROGRESS NOTES
[facility]    Medicine Progress Note - Hospitalist Service       Date of Admission:  6/25/2021    Assessment & Plan   Jim Richard is a 66 year old male with hx of alcoholic cirrhosis with ascites and ongoing alcohol use who was admitted on 6/25/2021 for recurrent ascites. Hospital course complicated by alcohol withdrawal which has now resolved. He is under an active commitment     Alcoholic cirrhosis with recurrent ascities  * Patient missed his weekly paracentesis and presented with markedly distended abdomen with associated pain and respiratory distress  * Underwent large-volume paracentesis (>5L removed) on 6/26, 6/29, and 7/2. No evidence of SBP. He is not on diuretics due to propensity for NGUYEN  * Tunneled peritoneal catheter was considered, but upon further discussion with IR was not felt to be a good candidate. Patient is currently under active commitment, but has longstanding history of non-compliance. Because it is unclear how long patient will be under commitment, decision was made to defer PleurX catheter placement  - Continues on therapeutic paracentesis prn. Last paracentesis 7/2. Reassess daily  - On 1500 mL fluid restriction  - He has been initiated on lactulose 20g BID for goal 2-3 BM daily  - Continues on PTA midodrine    NGUYEN on CKD stage IV, Improved  Baseline creatinine 2-2.2, presumably due to hepatorenal syndrome. Creatinine up to 2.71 during this hospitalization. Nephrology was consulted, and he was treated with IV fluids and albumin   - Creatinine had plateaued in 2.5-2.6 range. 2.8 today. Nephrology to order albumin, 7/6  - Continues on PTA midodrine  - Nephrology following    Hypervolemic hyponatremia, Resolved  Na fabiola 127, resolved with fluid restriction  - On 1500 mL fluid restricution    Pancytopenia due to alcoholic cirrhosis  CBC has been stable with no s/sx of bleeding or infection during this hospitalization  CBC RESULTS:   Recent Labs   Lab Test  07/06/21  0732   WBC 2.9*   RBC 2.82*   HGB 8.4*   HCT 26.3*   MCV 93   MCH 29.8   MCHC 31.9   RDW 17.8*   *       Urinary retention  History of BPH  Coude catheter placed 6/29 for ongoing urinary retention  - Voiding trial today, 7/6  - He was initiated on finasteride during this hospitalization  - Continues on PTA tamsulosin     Acute alcohol withdrawal, Improving  Alcohol dependence  Acutely intoxicated on arrival. BARNEY 0.34. Developed alcohol withdrawal following admission, and was treated with lorazepam per CIWA. Patient is apparently under active commitment that was recently renewed for another year.  - On thiamine/folate/MVI  - On K/Mg/Phos replacement protocols  - CD recommending TCU with CD services    Major recurrent depressive disorder with anxiety  * PTA: gabapentin 300 mg TID, mirtazapine 30 mg qhs, vortioxetine 10 mg BID, quetiapine 200 mg qhs and 50 mg TID prn, eszopiclone 3 mg qhs prn sleep]  - Continues on PTA meds, no adjustments per Psych during this admission     COPD  Chronic and stable on PTA inhalers    Nicotine dependence  Nicotine patch in place     JANIS  Chronic and stable on CPAP    Umbilical Hernia  Chronic and stable. Recently evaluated by General Surgery; no intervention recommended     Physical deconditioning in context of acute on chronic medical illness  - PT/OT recommending TCU     Diet: Fluid restriction 1500 ML FLUID  Snacks/Supplements Pediatric: Ensure Enlive; Between Meals  2 Gram Sodium Diet    DVT Prophylaxis: Pneumatic Compression Devices  Leger Catheter: PRESENT, indication: Retention  Code Status: No CPR- Do NOT Intubate      Disposition: Expected discharge to TCU pending bed availability. Will need paracentesis prior to discharge    The patient's care was discussed with the Bedside Nurse, Care Coordinator/ and Patient.    Sara Orellana MD  Hospitalist Service  [facility]  Contact information available via [facility]  Paging/Directory    ______________________________________________________________________    Interval History   No events overnight. Abdominal discomfort stable, does not feel particularly distended today. TCU referrals out - SW has not heard back.      Sara Orellana MD    Data reviewed today: I reviewed all medications, new labs and imaging results over the last 24 hours. I personally reviewed no images or EKG's today.    Physical Exam   Vital Signs: Temp: 96.3  F (35.7  C) Temp src: Axillary(eating ) BP: 138/66 Pulse: 63   Resp: 18 SpO2: 97 % O2 Device: None (Room air)    Weight: 170 lbs 3.2 oz  Constitutional: Resting in bed, NAD  HEENT: Sclera white, MMM  Respiratory: Breathing non-labored. Lungs CTAB - no wheezes, crackles, or rhonchi  Cardiovascular: Heart RRR, no m/r/g. No pedal edema  GI: +BS, abd distended, soft, mild TTP throughout. No rebound/guarding  Skin/Integument: No rash  Musculoskeletal: Normal muscle bulk and tone  Neuro: Alert and appropriate, BADILLO  Psych: Calm and cooperative    Data   Recent Labs   Lab 07/06/21  0732 07/04/21  0809 07/03/21  0712 07/02/21  0816   WBC 2.9* 3.5*  --  3.5*   HGB 8.4* 8.0*  --  8.2*   MCV 93 97  --  96   * 129*  --  112*    136 136 137   POTASSIUM 4.0 4.1 4.1 3.9   CHLORIDE 104 107 107 108   CO2 23 21 23 21   BUN 34* 32* 32* 30   CR 2.81* 2.58* 2.63* 2.57*   ANIONGAP 8 8 6 8   KEV 8.5 8.2* 8.4* 8.8   GLC 88 176* 94 79   ALBUMIN 2.9* 2.9*  --  3.2*   PROTTOTAL 6.3* 6.2*  --  6.6*   BILITOTAL 0.5 1.0  --  0.9   ALKPHOS 181* 170*  --  185*   ALT 20 22  --  22   AST 21 26  --  25         No results found for this or any previous visit (from the past 24 hour(s)).    Medications     - MEDICATION INSTRUCTIONS -       - MEDICATION INSTRUCTIONS -       - MEDICATION INSTRUCTIONS -       sodium chloride 0.9%         albumin human  12.5 g Intravenous Q8H     finasteride  5 mg Oral Daily     folic acid  1 mg Oral Daily     gabapentin  300 mg Oral TID      lactulose  20 g Oral BID     magnesium oxide  400 mg Oral Daily     miconazole   Topical BID     midodrine  10 mg Oral TID w/meals     mirtazapine  30 mg Oral At Bedtime     multivitamin w/minerals  1 tablet Oral Daily     nicotine  1 patch Transdermal Q24H     nicotine   Transdermal Q8H     pantoprazole  40 mg Oral BID     QUEtiapine  200 mg Oral At Bedtime     sodium chloride (PF)  3 mL Intracatheter Q8H     tamsulosin  0.8 mg Oral Daily     thiamine  100 mg Oral Daily     traZODone  100 mg Oral At Bedtime     vortioxetine  10 mg Oral BID

## 2021-07-06 NOTE — PROGRESS NOTES
[facility]    Nephrology Progress Note     Assessment & Plan     1) Cirrhosis with ascites and portal HTN:  On basis of ETOH use.  Frequent paracentesis.      2) CKD 4:  GFR has been 25-30 over the last month or so.  This is likely hepatorenal syndrome type 2. The trend has been steady decline.  GFR down again today.      3) Ascites: He has needed weekly paracenteses.  Hold on it for today given fall in GFR.      4) Pancytopenia:  Due to portal HTN and hypersplenism.    Plan:    Voiding trial - in hopes of getting him ready for discharge.  I will add back some 25% albumin.  Continue midodrine.      Dez Mann MD  [facility] - Nephrology  [phone]    Interval History     Frustrated.  Hoping to be discharged.  Cr up 2.58 to 2.81.      Physical Exam   Temp: 96.3  F (35.7  C) Temp src: Axillary(eating ) BP: 138/66 Pulse: 63   Resp: 18 SpO2: 97 % O2 Device: None (Room air)    Vitals:    06/30/21 0616 07/01/21 0529 07/04/21 0424   Weight: 77.4 kg (170 lb 9.6 oz) 76.7 kg (169 lb) 77.2 kg (170 lb 3.2 oz)     Vital Signs with Ranges  Temp:  [96.3  F (35.7  C)-98.9  F (37.2  C)] 96.3  F (35.7  C)  Pulse:  [60-72] 63  Resp:  [15-18] 18  BP: (122-138)/(63-68) 138/66  SpO2:  [95 %-97 %] 97 %  I/O last 3 completed shifts:  In: 440 [P.O.:440]  Out: 1475 [Urine:1475]    GENERAL APPEARANCE: pleasant, NAD, a & o  HEENT:  Eyes/ears/nose/neck grossly normal  RESP: lungs cta b c good efforts, no crackles, rhonchi or wheezes  CV: RRR, nl S1/S2, no m/r/g   ABDOMEN: distended, soft  EXTREMITIES/SKIN: no rashes/lesions; trace LLE edema    Medications     - MEDICATION INSTRUCTIONS -       - MEDICATION INSTRUCTIONS -       - MEDICATION INSTRUCTIONS -       sodium chloride 0.9%         finasteride  5 mg Oral Daily     folic acid  1 mg Oral Daily     gabapentin  300 mg Oral TID     lactulose  20 g Oral BID     magnesium oxide  400 mg Oral Daily     miconazole   Topical BID     midodrine  10 mg Oral  TID w/meals     mirtazapine  30 mg Oral At Bedtime     multivitamin w/minerals  1 tablet Oral Daily     nicotine  1 patch Transdermal Q24H     nicotine   Transdermal Q8H     pantoprazole  40 mg Oral BID     QUEtiapine  200 mg Oral At Bedtime     sodium chloride (PF)  3 mL Intracatheter Q8H     tamsulosin  0.8 mg Oral Daily     thiamine  100 mg Oral Daily     traZODone  100 mg Oral At Bedtime     vortioxetine  10 mg Oral BID       Data   BMP  Recent Labs   Lab 07/06/21  0732 07/04/21  0809 07/03/21  0712 07/02/21  0816    136 136 137   POTASSIUM 4.0 4.1 4.1 3.9   CHLORIDE 104 107 107 108   KEV 8.5 8.2* 8.4* 8.8   CO2 23 21 23 21   BUN 34* 32* 32* 30   CR 2.81* 2.58* 2.63* 2.57*   GLC 88 176* 94 79     Phos@[email or web address](phos:4)  CBC)  Recent Labs   Lab 07/06/21  0732 07/04/21  0809 07/02/21  0816 07/01/21  1647   WBC 2.9* 3.5* 3.5* 3.9*   HGB 8.4* 8.0* 8.2* 7.8*   HCT 26.3* 25.7* 25.5* 24.3*   MCV 93 97 96 94   * 129* 112* 112*     Recent Labs   Lab 07/06/21  0732 07/02/21  0816 07/02/21  0816   AST 21   < > 25   ALT 20   < > 22   ALKPHOS 181*   < > 185*   BILITOTAL 0.5   < > 0.9   ELPIDIO  --   --  39    < > = values in this interval not displayed.       I have reviewed today's relevant vital signs, notes, medications, labs and imaging.

## 2021-07-06 NOTE — PLAN OF CARE
A/ox4. VSS on RA. Denied pain/nausea. Gave prn melatonin for sleeping. Bruising to abdomen from paracentesis x3. Up SBA with walker/gait belt. Leger with adequate UOP. Will need voiding trial prior to discharge. Plan for paracentesis today and discharge once TCU placement found.

## 2021-07-06 NOTE — PROGRESS NOTES
Care Management Follow Up    Length of Stay (days): 10    Expected Discharge Date: 07/07/21     Concerns to be Addressed: discharge planning     Patient plan of care discussed at interdisciplinary rounds: Yes    Anticipated Discharge Disposition:       Anticipated Discharge Services:    Anticipated Discharge DME:      Patient/family educated on Medicare website which has current facility and service quality ratings:    Education Provided on the Discharge Plan:    Patient/Family in Agreement with the Plan:      Referrals Placed by CM/DANIELE:    Private pay costs discussed: Not applicable    Additional Information:  DANIELE spoke with Sabrina with [employer] who is reviewing patient's referrals.  Sabrina requesting copy of patient's commitment paperwork. DANIELE placed call to [employer] to inquire about commitment paperwork. Left VM.       MIRTA Navarro

## 2021-07-07 ENCOUNTER — APPOINTMENT (OUTPATIENT)
Dept: PHYSICAL THERAPY | Facility: CLINIC | Age: 66
DRG: 432 | End: 2021-07-07
Payer: MEDICARE

## 2021-07-07 ENCOUNTER — APPOINTMENT (OUTPATIENT)
Dept: GENERAL RADIOLOGY | Facility: CLINIC | Age: 66
DRG: 432 | End: 2021-07-07
Attending: INTERNAL MEDICINE
Payer: MEDICARE

## 2021-07-07 ENCOUNTER — APPOINTMENT (OUTPATIENT)
Dept: OCCUPATIONAL THERAPY | Facility: CLINIC | Age: 66
DRG: 432 | End: 2021-07-07
Payer: MEDICARE

## 2021-07-07 ENCOUNTER — APPOINTMENT (OUTPATIENT)
Dept: ULTRASOUND IMAGING | Facility: CLINIC | Age: 66
DRG: 432 | End: 2021-07-07
Attending: INTERNAL MEDICINE
Payer: MEDICARE

## 2021-07-07 LAB
ALBUMIN FLD-MCNC: 0.5 G/DL
ALBUMIN SERPL-MCNC: 3.1 G/DL (ref 3.4–5)
ALBUMIN UR-MCNC: NEGATIVE MG/DL
ANION GAP SERPL CALCULATED.3IONS-SCNC: 5 MMOL/L (ref 3–14)
APPEARANCE UR: CLEAR
BILIRUB UR QL STRIP: NEGATIVE
BUN SERPL-MCNC: 32 MG/DL (ref 7–30)
CALCIUM SERPL-MCNC: 8.4 MG/DL (ref 8.5–10.1)
CHLORIDE SERPL-SCNC: 103 MMOL/L (ref 94–109)
CO2 SERPL-SCNC: 26 MMOL/L (ref 20–32)
COLOR UR AUTO: NORMAL
CREAT SERPL-MCNC: 2.84 MG/DL (ref 0.66–1.25)
GFR SERPL CREATININE-BSD FRML MDRD: 22 ML/MIN/{1.73_M2}
GLUCOSE SERPL-MCNC: 108 MG/DL (ref 70–99)
GLUCOSE UR STRIP-MCNC: NEGATIVE MG/DL
GRAM STN SPEC: NORMAL
HGB UR QL STRIP: NEGATIVE
KETONES UR STRIP-MCNC: NEGATIVE MG/DL
LEUKOCYTE ESTERASE UR QL STRIP: NEGATIVE
NITRATE UR QL: NEGATIVE
PH UR STRIP: 7 PH (ref 5–7)
PHOSPHATE SERPL-MCNC: 2.8 MG/DL (ref 2.5–4.5)
POTASSIUM SERPL-SCNC: 4 MMOL/L (ref 3.4–5.3)
PROCALCITONIN SERPL-MCNC: 0.39 NG/ML
PROT FLD-MCNC: 1.1 G/DL
RBC #/AREA URNS AUTO: 1 /HPF (ref 0–2)
SODIUM SERPL-SCNC: 134 MMOL/L (ref 133–144)
SOURCE: NORMAL
SP GR UR STRIP: 1.01 (ref 1–1.03)
SPECIMEN SOURCE FLD: NORMAL
SPECIMEN SOURCE FLD: NORMAL
SPECIMEN SOURCE: NORMAL
SQUAMOUS #/AREA URNS AUTO: <1 /HPF (ref 0–1)
UROBILINOGEN UR STRIP-MCNC: 0 MG/DL (ref 0–2)
WBC #/AREA URNS AUTO: 4 /HPF (ref 0–5)

## 2021-07-07 PROCEDURE — 82042 OTHER SOURCE ALBUMIN QUAN EA: CPT | Performed by: INTERNAL MEDICINE

## 2021-07-07 PROCEDURE — 71046 X-RAY EXAM CHEST 2 VIEWS: CPT

## 2021-07-07 PROCEDURE — 0W9G3ZX DRAINAGE OF PERITONEAL CAVITY, PERCUTANEOUS APPROACH, DIAGNOSTIC: ICD-10-PCS | Performed by: RADIOLOGY

## 2021-07-07 PROCEDURE — 87800 DETECT AGNT MULT DNA DIREC: CPT | Performed by: HOSPITALIST

## 2021-07-07 PROCEDURE — 250N000013 HC RX MED GY IP 250 OP 250 PS 637: Performed by: INTERNAL MEDICINE

## 2021-07-07 PROCEDURE — 87075 CULTR BACTERIA EXCEPT BLOOD: CPT | Performed by: INTERNAL MEDICINE

## 2021-07-07 PROCEDURE — 81001 URINALYSIS AUTO W/SCOPE: CPT | Performed by: HOSPITALIST

## 2021-07-07 PROCEDURE — 36415 COLL VENOUS BLD VENIPUNCTURE: CPT | Performed by: HOSPITALIST

## 2021-07-07 PROCEDURE — 84145 PROCALCITONIN (PCT): CPT | Performed by: INTERNAL MEDICINE

## 2021-07-07 PROCEDURE — 87015 SPECIMEN INFECT AGNT CONCNTJ: CPT | Performed by: INTERNAL MEDICINE

## 2021-07-07 PROCEDURE — 97110 THERAPEUTIC EXERCISES: CPT | Mod: GP | Performed by: PHYSICAL THERAPIST

## 2021-07-07 PROCEDURE — 80069 RENAL FUNCTION PANEL: CPT | Performed by: INTERNAL MEDICINE

## 2021-07-07 PROCEDURE — 36415 COLL VENOUS BLD VENIPUNCTURE: CPT | Performed by: INTERNAL MEDICINE

## 2021-07-07 PROCEDURE — 87077 CULTURE AEROBIC IDENTIFY: CPT | Performed by: HOSPITALIST

## 2021-07-07 PROCEDURE — 250N000011 HC RX IP 250 OP 636

## 2021-07-07 PROCEDURE — 120N000001 HC R&B MED SURG/OB

## 2021-07-07 PROCEDURE — 89051 BODY FLUID CELL COUNT: CPT | Performed by: INTERNAL MEDICINE

## 2021-07-07 PROCEDURE — 87186 SC STD MICRODIL/AGAR DIL: CPT | Performed by: HOSPITALIST

## 2021-07-07 PROCEDURE — 49083 ABD PARACENTESIS W/IMAGING: CPT

## 2021-07-07 PROCEDURE — 87040 BLOOD CULTURE FOR BACTERIA: CPT | Performed by: HOSPITALIST

## 2021-07-07 PROCEDURE — 999N000154 HC STATISTIC RADIOLOGY XRAY, US, CT, MAR, NM

## 2021-07-07 PROCEDURE — 250N000011 HC RX IP 250 OP 636: Performed by: INTERNAL MEDICINE

## 2021-07-07 PROCEDURE — 99233 SBSQ HOSP IP/OBS HIGH 50: CPT | Performed by: INTERNAL MEDICINE

## 2021-07-07 PROCEDURE — P9047 ALBUMIN (HUMAN), 25%, 50ML: HCPCS | Performed by: INTERNAL MEDICINE

## 2021-07-07 PROCEDURE — 97110 THERAPEUTIC EXERCISES: CPT | Mod: GO

## 2021-07-07 PROCEDURE — 84157 ASSAY OF PROTEIN OTHER: CPT | Performed by: INTERNAL MEDICINE

## 2021-07-07 PROCEDURE — 97530 THERAPEUTIC ACTIVITIES: CPT | Mod: GO

## 2021-07-07 PROCEDURE — 97116 GAIT TRAINING THERAPY: CPT | Mod: GP | Performed by: PHYSICAL THERAPIST

## 2021-07-07 PROCEDURE — P9047 ALBUMIN (HUMAN), 25%, 50ML: HCPCS

## 2021-07-07 PROCEDURE — 87205 SMEAR GRAM STAIN: CPT | Performed by: INTERNAL MEDICINE

## 2021-07-07 PROCEDURE — 87070 CULTURE OTHR SPECIMN AEROBIC: CPT | Performed by: INTERNAL MEDICINE

## 2021-07-07 PROCEDURE — 250N000013 HC RX MED GY IP 250 OP 250 PS 637: Performed by: HOSPITALIST

## 2021-07-07 RX ORDER — LIDOCAINE HYDROCHLORIDE 10 MG/ML
10 INJECTION, SOLUTION EPIDURAL; INFILTRATION; INTRACAUDAL; PERINEURAL ONCE
Status: COMPLETED | OUTPATIENT
Start: 2021-07-07 | End: 2021-07-07

## 2021-07-07 RX ORDER — ALBUMIN (HUMAN) 12.5 G/50ML
12.5 SOLUTION INTRAVENOUS ONCE
Status: COMPLETED | OUTPATIENT
Start: 2021-07-07 | End: 2021-07-07

## 2021-07-07 RX ORDER — ALBUMIN (HUMAN) 12.5 G/50ML
12.5 SOLUTION INTRAVENOUS ONCE
Status: CANCELLED | OUTPATIENT
Start: 2021-07-07 | End: 2021-07-07

## 2021-07-07 RX ORDER — DEXTROSE MONOHYDRATE 25 G/50ML
25-50 INJECTION, SOLUTION INTRAVENOUS
Status: CANCELLED | OUTPATIENT
Start: 2021-07-07

## 2021-07-07 RX ORDER — CEFTRIAXONE 1 G/1
1 INJECTION, POWDER, FOR SOLUTION INTRAMUSCULAR; INTRAVENOUS EVERY 24 HOURS
Status: DISCONTINUED | OUTPATIENT
Start: 2021-07-07 | End: 2021-07-08

## 2021-07-07 RX ORDER — LIDOCAINE 40 MG/G
CREAM TOPICAL
Status: CANCELLED | OUTPATIENT
Start: 2021-07-07

## 2021-07-07 RX ORDER — GABAPENTIN 100 MG/1
200 CAPSULE ORAL 3 TIMES DAILY
Status: DISCONTINUED | OUTPATIENT
Start: 2021-07-07 | End: 2021-07-14 | Stop reason: HOSPADM

## 2021-07-07 RX ORDER — NICOTINE POLACRILEX 4 MG
15-30 LOZENGE BUCCAL
Status: CANCELLED | OUTPATIENT
Start: 2021-07-07

## 2021-07-07 RX ADMIN — TRAZODONE HYDROCHLORIDE 100 MG: 100 TABLET ORAL at 21:59

## 2021-07-07 RX ADMIN — MIDODRINE HYDROCHLORIDE 10 MG: 5 TABLET ORAL at 09:15

## 2021-07-07 RX ADMIN — QUETIAPINE FUMARATE 50 MG: 25 TABLET ORAL at 09:28

## 2021-07-07 RX ADMIN — OXYCODONE HYDROCHLORIDE 10 MG: 5 TABLET ORAL at 11:07

## 2021-07-07 RX ADMIN — FINASTERIDE 5 MG: 5 TABLET, FILM COATED ORAL at 09:15

## 2021-07-07 RX ADMIN — GABAPENTIN 300 MG: 300 CAPSULE ORAL at 09:15

## 2021-07-07 RX ADMIN — MIRTAZAPINE 30 MG: 15 TABLET, FILM COATED ORAL at 21:59

## 2021-07-07 RX ADMIN — MICONAZOLE NITRATE: 20 POWDER TOPICAL at 09:18

## 2021-07-07 RX ADMIN — OXYCODONE HYDROCHLORIDE 10 MG: 5 TABLET ORAL at 20:47

## 2021-07-07 RX ADMIN — GABAPENTIN 200 MG: 100 CAPSULE ORAL at 16:50

## 2021-07-07 RX ADMIN — MICONAZOLE NITRATE: 20 POWDER TOPICAL at 21:12

## 2021-07-07 RX ADMIN — MIDODRINE HYDROCHLORIDE 10 MG: 5 TABLET ORAL at 11:48

## 2021-07-07 RX ADMIN — FOLIC ACID 1 MG: 1 TABLET ORAL at 09:15

## 2021-07-07 RX ADMIN — OXYCODONE HYDROCHLORIDE 10 MG: 5 TABLET ORAL at 16:50

## 2021-07-07 RX ADMIN — MAGNESIUM OXIDE TAB 400 MG (241.3 MG ELEMENTAL MG) 400 MG: 400 (241.3 MG) TAB at 09:15

## 2021-07-07 RX ADMIN — GABAPENTIN 200 MG: 100 CAPSULE ORAL at 21:59

## 2021-07-07 RX ADMIN — ALBUMIN HUMAN 12.5 G: 0.25 SOLUTION INTRAVENOUS at 11:07

## 2021-07-07 RX ADMIN — ALBUMIN HUMAN 12.5 G: 0.25 SOLUTION INTRAVENOUS at 01:47

## 2021-07-07 RX ADMIN — VORTIOXETINE 10 MG: 10 TABLET, FILM COATED ORAL at 20:47

## 2021-07-07 RX ADMIN — LACTULOSE 20 G: 10 SOLUTION ORAL at 09:19

## 2021-07-07 RX ADMIN — PANTOPRAZOLE SODIUM 40 MG: 40 TABLET, DELAYED RELEASE ORAL at 20:47

## 2021-07-07 RX ADMIN — NICOTINE 1 PATCH: 21 PATCH, EXTENDED RELEASE TRANSDERMAL at 22:04

## 2021-07-07 RX ADMIN — MIDODRINE HYDROCHLORIDE 10 MG: 5 TABLET ORAL at 18:17

## 2021-07-07 RX ADMIN — CEFTRIAXONE SODIUM 1 G: 1 INJECTION, POWDER, FOR SOLUTION INTRAMUSCULAR; INTRAVENOUS at 17:15

## 2021-07-07 RX ADMIN — QUETIAPINE 200 MG: 200 TABLET, FILM COATED ORAL at 21:59

## 2021-07-07 RX ADMIN — LACTULOSE 20 G: 10 SOLUTION ORAL at 21:11

## 2021-07-07 RX ADMIN — LIDOCAINE HYDROCHLORIDE 10 ML: 10 INJECTION, SOLUTION EPIDURAL; INFILTRATION; INTRACAUDAL; PERINEURAL at 14:37

## 2021-07-07 RX ADMIN — PANTOPRAZOLE SODIUM 40 MG: 40 TABLET, DELAYED RELEASE ORAL at 09:15

## 2021-07-07 RX ADMIN — THIAMINE HCL TAB 100 MG 100 MG: 100 TAB at 09:15

## 2021-07-07 RX ADMIN — TAMSULOSIN HYDROCHLORIDE 0.8 MG: 0.4 CAPSULE ORAL at 09:15

## 2021-07-07 RX ADMIN — MULTIPLE VITAMINS W/ MINERALS TAB 1 TABLET: TAB at 09:15

## 2021-07-07 RX ADMIN — VORTIOXETINE 10 MG: 10 TABLET, FILM COATED ORAL at 09:15

## 2021-07-07 ASSESSMENT — MIFFLIN-ST. JEOR: SCORE: 1531.05

## 2021-07-07 ASSESSMENT — ACTIVITIES OF DAILY LIVING (ADL)
ADLS_ACUITY_SCORE: 13

## 2021-07-07 NOTE — PLAN OF CARE
Patient A&0x4. Up w 1 Gb and walker.  Forgetful at times. Ciwa of 0. Abdomin is distended but soft. Abdomin tender w touch. C/o pain on left side of abdomin at times. 10mg Oxycodone for pain x1 this shift.  Paracentesis sites are CDI. Open to air.  Lungs diminished. On RA. FR 1500cc. Has had 1100cc so far this shift. DNR/I

## 2021-07-07 NOTE — PROGRESS NOTES
RADIOLOGY PROCEDURE NOTE  Patient name: Jim Richard  MRN: [id number]  : 1955    Pre-procedure diagnosis: Ascites  Post-procedure diagnosis: Same    Procedure Date/Time: 2021  3:26 PM  Procedure: Paracentesis  Estimated blood loss: None  Specimen(s) collected with description: Ascites.  The patient tolerated the procedure well with no immediate complications.    See imaging dictation for procedural details and findings.    Provider name: Sebastien Pereira MD  Assistant(s):None

## 2021-07-07 NOTE — PROVIDER NOTIFICATION
MD Notification    Notified Person: MD    Notified Person Name: Dr. Flores    Notification Date/Time: 7/7/21, 2:00am    Notification Interaction: amcom paged    Purpose of Notification: patient having new fever of 101.1    Orders Received:    Comments:

## 2021-07-07 NOTE — PROGRESS NOTES
Medicine Progress Note - Hospitalist Service       Date of Admission:  6/25/2021    Assessment & Plan         Jim Richard is a 66 year old male with hx of alcoholic cirrhosis with ascites and ongoing alcohol use who was admitted on 6/25/2021 for recurrent ascites. Hospital course complicated by alcohol withdrawal which has now resolved. He is under an active commitment      Alcoholic cirrhosis with recurrent ascities  * Patient missed his weekly paracentesis and presented with markedly distended abdomen with associated pain and respiratory distress  * Underwent large-volume paracentesis (>5L removed) on 6/26, 6/29, and 7/2. No evidence of SBP. He is not on diuretics due to propensity for NGUYEN  * Tunneled peritoneal catheter was considered, but upon further discussion with IR was not felt to be a good candidate. Patient is currently under active commitment, but has longstanding history of non-compliance. Because it is unclear how long patient will be under commitment, decision was made to defer PleurX catheter placement  - Continues on therapeutic paracentesis prn. Last paracentesis 7/2. Reassess daily  - On 1500 mL fluid restriction  - He has been initiated on lactulose 20g BID for goal 2-3 BM daily  - Continues on PTA midodrine    Fever  7/7:  Over night patient spiked a fever of 101.2 degrees.  He does note a cough recently but unsure if this is new.  No other symptoms noted.  UA did not show any evidence of UTI.  CXR performed and lower lobe opacity appears unchanged  - Follow cultures  - Paracentesis ordered to rule out SBP  - Ceftriaxone ordered     CKD stage IV w/NGUYEN vs worsening renal function   Baseline creatinine 2-2.2, presumably due to hepatorenal syndrome. Creatinine up to 2.71 during this hospitalization. Nephrology was consulted, and he was treated with IV fluids and albumin  - Creatinine had plateaued in 2.5-2.6 range. 2.8 today. Nephrology to order  albumin, 7/6  - Continues on PTA midodrine  - Nephrology following and appreciate their recommendations      Hypervolemic hyponatremia  Na fabiola 127, resolved with fluid restriction  - On 1500 mL fluid restricution     Pancytopenia due to alcoholic cirrhosis  CBC has been stable with no s/sx of bleeding or infection during this hospitalization  - Follow intermittently      Urinary retention  History of BPH  Coude catheter placed 6/29 for ongoing urinary retention  - Voiding trial today, 7/6  - He was initiated on finasteride during this hospitalization  - Continues on PTA tamsulosin     Acute alcohol withdrawal. Resolved  Alcohol dependence  Acutely intoxicated on arrival. BARNEY 0.34. Developed alcohol withdrawal following admission, and was treated with lorazepam per CIWA. Patient is apparently under active commitment that was recently renewed for another year.  - On thiamine/folate/MVI  - On K/Mg/Phos replacement protocols  - CD recommending TCU with CD services     Major recurrent depressive disorder with anxiety  * PTA: gabapentin 300 mg TID, mirtazapine 30 mg qhs, vortioxetine 10 mg BID, quetiapine 200 mg qhs and 50 mg TID prn, eszopiclone 3 mg qhs prn sleep]  - Continues on PTA meds, no adjustments per Psych during this admission  - Renally dosing Neurontin      COPD  Chronic and stable on PTA inhalers     Nicotine dependence  Nicotine patch in place     JANIS  Chronic and stable on CPAP     Umbilical Hernia  Chronic and stable. Recently evaluated by General Surgery; no intervention recommended     Physical deconditioning in context of acute on chronic medical illness  - PT/OT recommending TCU         Diet: Fluid restriction 1500 ML FLUID  Snacks/Supplements Pediatric: Ensure Enlive; Between Meals  2 Gram Sodium Diet    DVT Prophylaxis: Pneumatic Compression Devices  Leger Catheter: Not present  Central Lines: None  Code Status: No CPR- Do NOT Intubate      Disposition Plan   Expected discharge: 2 - 3 days,  recommended to transitional care unit once renal function stable, infection work up complete.     The patient's care was discussed with the Bedside Nurse, Care Coordinator/ and Patient.    Time Spent on this Encounter   Over 35 minutes was spent examining and discussing the patient with greater than 50% time spent in counseling and/or coordination of care.     Godfrey Vasques DO  Hospitalist Service  [facility]  Securely message with the Vocera Web Console (learn more here)  Text page via [employer] Paging/Directory      Risk Factors Present on Admission                ______________________________________________________________________    Interval History   Patient seen and examined.  Had a fever of 101.2 degrees over night.  Patient notes a cough recently but unclear if this is new.  No chest pain or SOB.  Some left sided abdominal pain that is not new       Data reviewed today: I reviewed all medications, new labs and imaging results over the last 24 hours. I personally reviewed CXR as below     Physical Exam   Vital Signs: Temp: 98.3  F (36.8  C) Temp src: Oral BP: 128/60 Pulse: 55   Resp: 14 SpO2: 94 % O2 Device: None (Room air)    Weight: 175 lbs 12.8 oz  General Appearance: Resting comfortably. NAD   Respiratory: Clear to auscultation.  No respiratory distress  Cardiovascular: Mildly bradycardiac.  No obvious murmurs  GI: Moderate to severe distension.  Mildly tender  Skin: No obvious rashes or cyanosis  Other: Bilateral lower extremity edema noted.  Alert and moving all extremities      Data   Recent Labs   Lab 07/07/21  0827 07/06/21  0732 07/04/21  0809 07/02/21  0816 07/02/21  0816   WBC  --  2.9* 3.5*  --  3.5*   HGB  --  8.4* 8.0*  --  8.2*   MCV  --  93 97  --  96   PLT  --  134* 129*  --  112*    135 136   < > 137   POTASSIUM 4.0 4.0 4.1   < > 3.9   CHLORIDE 103 104 107   < > 108   CO2 26 23 21   < > 21   BUN 32* 34* 32*   < > 30   CR 2.84* 2.81* 2.58*   <  > 2.57*   ANIONGAP 5 8 8   < > 8   KEV 8.4* 8.5 8.2*   < > 8.8   * 88 176*   < > 79   ALBUMIN 3.1* 2.9* 2.9*  --  3.2*   PROTTOTAL  --  6.3* 6.2*  --  6.6*   BILITOTAL  --  0.5 1.0  --  0.9   ALKPHOS  --  181* 170*  --  185*   ALT  --  20 22  --  22   AST  --  21 26  --  25    < > = values in this interval not displayed.     Recent Results (from the past 24 hour(s))   XR Chest 2 Views    Narrative    CHEST TWO VIEWS 7/7/2021 12:21 PM     HISTORY: Fever and cough    COMPARISON: June 25, 2021       Impression    IMPRESSION: Opacity at the left base and elevated left hemidiaphragm  are stable. No definite right-sided infiltrates. Rib deformity stable  on the right. The cardiac silhouette is not enlarged. Pulmonary  vasculature is unremarkable.

## 2021-07-07 NOTE — PROGRESS NOTES
Care Suites Procedure Nursing Note    Patient Information  Name: Jim Richard  Age: 66 year old    Procedure  Procedure: paracentesis  Procedure start time: 1430  Procedure complete time: 1500  Concerns/abnormal assessment:   If abnormal assessment, provider notified: N/A.  3 liters of Straw colored fluid removed from LLQ.Joce well. VSS throughout.  Plan/Other: proceed.  1539 Report called to Dalia OVALLE on floor.Will transfer pt back to room.  Brad Mccullough RN

## 2021-07-07 NOTE — PROGRESS NOTES
A&Ox4. VSS. LS diminished. Ax1 + GB & walker. CIWA: 0. C/o abdominal pain; PRN oxy x1, effective. Para today; 3L out. SL w/ intermittent abx. Fluid restriction 1500mL; 2G Na+ diet.

## 2021-07-07 NOTE — PLAN OF CARE
A/ox4. Tmax 101.1- notified on call MD- had blood cultures done and UA collected- view results. Denied pain/SOB/or nausea. LS dim. Per pt abdomen feels slightly more firm compared to the day before. BS active, +flatus. Had jiang removed yesterday. Pt having urinary hesitancy. Intermittent bladders scanning. Voiding via bedside urinal- needs assistance. Did not meet parameters to straight cath overnight. Up with assist 1 gait belt and walker. Discharge pending.

## 2021-07-07 NOTE — PLAN OF CARE
A&O, disoriented to year. C/o nausea, zofran given x1 with improvement. Used urinal x1 for 200mL, . Abd firm and rounded. Up SBA. Discharge pending TCU placement, plan for PRN para.

## 2021-07-08 LAB
ANION GAP SERPL CALCULATED.3IONS-SCNC: 7 MMOL/L (ref 3–14)
APPEARANCE FLD: NORMAL
BUN SERPL-MCNC: 33 MG/DL (ref 7–30)
CALCIUM SERPL-MCNC: 8.1 MG/DL (ref 8.5–10.1)
CHLORIDE SERPL-SCNC: 103 MMOL/L (ref 94–109)
CO2 SERPL-SCNC: 23 MMOL/L (ref 20–32)
COLOR FLD: YELLOW
CREAT SERPL-MCNC: 2.82 MG/DL (ref 0.66–1.25)
ERYTHROCYTE [DISTWIDTH] IN BLOOD BY AUTOMATED COUNT: 18 % (ref 10–15)
GFR SERPL CREATININE-BSD FRML MDRD: 22 ML/MIN/{1.73_M2}
GLUCOSE SERPL-MCNC: 134 MG/DL (ref 70–99)
HCT VFR BLD AUTO: 22.2 % (ref 40–53)
HGB BLD-MCNC: 7.2 G/DL (ref 13.3–17.7)
LYMPHOCYTES NFR FLD MANUAL: 14 %
MCH RBC QN AUTO: 30.8 PG (ref 26.5–33)
MCHC RBC AUTO-ENTMCNC: 32.4 G/DL (ref 31.5–36.5)
MCV RBC AUTO: 95 FL (ref 78–100)
MONOS+MACROS NFR FLD MANUAL: 12 %
NEUTS BAND NFR FLD MANUAL: 9 %
OTHER CELLS FLD MANUAL: 65 %
PLATELET # BLD AUTO: 115 10E9/L (ref 150–450)
POTASSIUM SERPL-SCNC: 3.8 MMOL/L (ref 3.4–5.3)
RBC # BLD AUTO: 2.34 10E12/L (ref 4.4–5.9)
SODIUM SERPL-SCNC: 133 MMOL/L (ref 133–144)
SPECIMEN SOURCE FLD: NORMAL
WBC # BLD AUTO: 2.9 10E9/L (ref 4–11)
WBC # FLD AUTO: 327 /UL

## 2021-07-08 PROCEDURE — 80048 BASIC METABOLIC PNL TOTAL CA: CPT | Performed by: INTERNAL MEDICINE

## 2021-07-08 PROCEDURE — 250N000013 HC RX MED GY IP 250 OP 250 PS 637: Performed by: INTERNAL MEDICINE

## 2021-07-08 PROCEDURE — 250N000011 HC RX IP 250 OP 636: Performed by: SPECIALIST

## 2021-07-08 PROCEDURE — 99232 SBSQ HOSP IP/OBS MODERATE 35: CPT | Performed by: INTERNAL MEDICINE

## 2021-07-08 PROCEDURE — 120N000001 HC R&B MED SURG/OB

## 2021-07-08 PROCEDURE — 250N000009 HC RX 250: Performed by: INTERNAL MEDICINE

## 2021-07-08 PROCEDURE — 99233 SBSQ HOSP IP/OBS HIGH 50: CPT | Performed by: INTERNAL MEDICINE

## 2021-07-08 PROCEDURE — 250N000011 HC RX IP 250 OP 636: Performed by: INTERNAL MEDICINE

## 2021-07-08 PROCEDURE — 250N000011 HC RX IP 250 OP 636: Performed by: HOSPITALIST

## 2021-07-08 PROCEDURE — 85027 COMPLETE CBC AUTOMATED: CPT | Performed by: INTERNAL MEDICINE

## 2021-07-08 PROCEDURE — 250N000013 HC RX MED GY IP 250 OP 250 PS 637: Performed by: HOSPITALIST

## 2021-07-08 PROCEDURE — 36415 COLL VENOUS BLD VENIPUNCTURE: CPT | Performed by: INTERNAL MEDICINE

## 2021-07-08 RX ORDER — AMPICILLIN 1 G/1
1 INJECTION, POWDER, FOR SOLUTION INTRAMUSCULAR; INTRAVENOUS EVERY 6 HOURS
Status: DISCONTINUED | OUTPATIENT
Start: 2021-07-08 | End: 2021-07-08

## 2021-07-08 RX ORDER — AMPICILLIN 2 G/1
2 INJECTION, POWDER, FOR SOLUTION INTRAVENOUS EVERY 8 HOURS
Status: DISCONTINUED | OUTPATIENT
Start: 2021-07-08 | End: 2021-07-14 | Stop reason: HOSPADM

## 2021-07-08 RX ADMIN — VORTIOXETINE 10 MG: 10 TABLET, FILM COATED ORAL at 08:22

## 2021-07-08 RX ADMIN — FOLIC ACID 1 MG: 1 TABLET ORAL at 08:21

## 2021-07-08 RX ADMIN — LACTULOSE 20 G: 10 SOLUTION ORAL at 08:21

## 2021-07-08 RX ADMIN — MIDODRINE HYDROCHLORIDE 10 MG: 5 TABLET ORAL at 08:21

## 2021-07-08 RX ADMIN — MIDODRINE HYDROCHLORIDE 10 MG: 5 TABLET ORAL at 18:23

## 2021-07-08 RX ADMIN — MIRTAZAPINE 30 MG: 15 TABLET, FILM COATED ORAL at 21:23

## 2021-07-08 RX ADMIN — THIAMINE HCL TAB 100 MG 100 MG: 100 TAB at 08:21

## 2021-07-08 RX ADMIN — PANTOPRAZOLE SODIUM 40 MG: 40 TABLET, DELAYED RELEASE ORAL at 08:21

## 2021-07-08 RX ADMIN — TRAZODONE HYDROCHLORIDE 100 MG: 100 TABLET ORAL at 21:23

## 2021-07-08 RX ADMIN — QUETIAPINE FUMARATE 50 MG: 25 TABLET ORAL at 18:30

## 2021-07-08 RX ADMIN — TAMSULOSIN HYDROCHLORIDE 0.8 MG: 0.4 CAPSULE ORAL at 08:21

## 2021-07-08 RX ADMIN — CEFTRIAXONE SODIUM 1 G: 1 INJECTION, POWDER, FOR SOLUTION INTRAMUSCULAR; INTRAVENOUS at 16:03

## 2021-07-08 RX ADMIN — QUETIAPINE FUMARATE 50 MG: 25 TABLET ORAL at 09:23

## 2021-07-08 RX ADMIN — QUETIAPINE 200 MG: 200 TABLET, FILM COATED ORAL at 21:23

## 2021-07-08 RX ADMIN — MULTIPLE VITAMINS W/ MINERALS TAB 1 TABLET: TAB at 08:21

## 2021-07-08 RX ADMIN — PANTOPRAZOLE SODIUM 40 MG: 40 TABLET, DELAYED RELEASE ORAL at 21:23

## 2021-07-08 RX ADMIN — AMPICILLIN SODIUM 2 G: 2 INJECTION, POWDER, FOR SOLUTION INTRAVENOUS at 19:33

## 2021-07-08 RX ADMIN — GABAPENTIN 200 MG: 100 CAPSULE ORAL at 16:03

## 2021-07-08 RX ADMIN — AMPICILLIN SODIUM 1 G: 1 INJECTION, POWDER, FOR SOLUTION INTRAMUSCULAR; INTRAVENOUS at 02:24

## 2021-07-08 RX ADMIN — MAGNESIUM OXIDE TAB 400 MG (241.3 MG ELEMENTAL MG) 400 MG: 400 (241.3 MG) TAB at 08:21

## 2021-07-08 RX ADMIN — AMPICILLIN SODIUM 1 G: 1 INJECTION, POWDER, FOR SOLUTION INTRAMUSCULAR; INTRAVENOUS at 08:21

## 2021-07-08 RX ADMIN — AMPICILLIN SODIUM 1 G: 1 INJECTION, POWDER, FOR SOLUTION INTRAMUSCULAR; INTRAVENOUS at 13:51

## 2021-07-08 RX ADMIN — GABAPENTIN 200 MG: 100 CAPSULE ORAL at 21:23

## 2021-07-08 RX ADMIN — VORTIOXETINE 10 MG: 10 TABLET, FILM COATED ORAL at 21:23

## 2021-07-08 RX ADMIN — FINASTERIDE 5 MG: 5 TABLET, FILM COATED ORAL at 08:21

## 2021-07-08 RX ADMIN — MIDODRINE HYDROCHLORIDE 10 MG: 5 TABLET ORAL at 11:54

## 2021-07-08 RX ADMIN — GABAPENTIN 200 MG: 100 CAPSULE ORAL at 08:21

## 2021-07-08 RX ADMIN — MICONAZOLE NITRATE: 20 POWDER TOPICAL at 08:26

## 2021-07-08 ASSESSMENT — ACTIVITIES OF DAILY LIVING (ADL)
ADLS_ACUITY_SCORE: 14
ADLS_ACUITY_SCORE: 13
ADLS_ACUITY_SCORE: 14

## 2021-07-08 ASSESSMENT — MIFFLIN-ST. JEOR: SCORE: 1487.51

## 2021-07-08 NOTE — PROGRESS NOTES
Paged for blood cultures positive for E faecalis bacteremia, negative for VRE. For overnight, IV Ampicillin added to Ceftriaxone, pending further clinical team reassessment this AM.

## 2021-07-08 NOTE — PROGRESS NOTES
[facility]    Medicine Progress Note - Hospitalist Service       Date of Admission:  6/25/2021    Assessment & Plan             Jim Richard is a 66 year old male with hx of alcoholic cirrhosis with ascites and ongoing alcohol use who was admitted on 6/25/2021 for recurrent ascites. Hospital course complicated by alcohol withdrawal which has now resolved. He is under an active commitment      Alcoholic cirrhosis with recurrent ascities  * Patient missed his weekly paracentesis and presented with markedly distended abdomen with associated pain and respiratory distress  * Underwent large-volume paracentesis (>5L removed) on 6/26, 6/29, and 7/2. No evidence of SBP. He is not on diuretics due to propensity for NGUYEN  * Tunneled peritoneal catheter was considered, but upon further discussion with IR was not felt to be a good candidate. Patient is currently under active commitment, but has longstanding history of non-compliance. Because it is unclear how long patient will be under commitment, decision was made to defer PleurX catheter placement  - Continues on therapeutic paracentesis prn. Last paracentesis 7/2. Reassess daily  - On 1500 mL fluid restriction  - He has been initiated on lactulose 20g BID for goal 2-3 BM daily  - Continues on PTA midodrine    Fever  Possible bacteremia   7/7:  Over night patient spiked a fever of 101.2 degrees.  He does note a cough recently but unsure if this is new.  No other symptoms noted.  UA did not show any evidence of UTI.  CXR performed and lower lobe opacity appears unchanged.  Paracentesis done with 327 WBC but only 9% Neutrophils.   - Follow cultures.  1/2 cultures from 7/7 growing Enterococcus faecalis   - Continue Ceftriaxone and they   - ID consulted and appreciate their recommendations      CKD stage IV w/NGUYEN vs worsening renal function   Baseline creatinine 2-2.2, presumably due to hepatorenal syndrome. Creatinine up to 2.71 during this  hospitalization. Nephrology was consulted, and he was treated with IV fluids and albumin  - Creatinine had plateaued in 2.5-2.8 range   - Continues on PTA midodrine  - Nephrology following and appreciate their recommendations      Hypervolemic hyponatremia  Na fabiola 127, resolved with fluid restriction  - On 1500 mL fluid restricution     Pancytopenia due to alcoholic cirrhosis  CBC has been stable with no s/sx of bleeding or infection during this hospitalization  - Follow intermittently      Urinary retention  History of BPH  Coude catheter placed 6/29 for ongoing urinary retention  - Voiding trial today, 7/6  - He was initiated on finasteride during this hospitalization  - Continues on PTA tamsulosin     Acute alcohol withdrawal. Resolved  Alcohol dependence  Acutely intoxicated on arrival. BARNEY 0.34. Developed alcohol withdrawal following admission, and was treated with lorazepam per SHAUN. Patient is apparently under active commitment that was recently renewed for another year.  - On thiamine/folate/MVI  - On K/Mg/Phos replacement protocols  - CD recommending TCU with CD services     Major recurrent depressive disorder with anxiety  * PTA: gabapentin 300 mg TID, mirtazapine 30 mg qhs, vortioxetine 10 mg BID, quetiapine 200 mg qhs and 50 mg TID prn, eszopiclone 3 mg qhs prn sleep]  - Continues on PTA meds, no adjustments per Psych during this admission  - Renally dosing Neurontin      COPD  Chronic and stable on PTA inhalers     Nicotine dependence  Nicotine patch in place     JANIS  Chronic and stable on CPAP     Umbilical Hernia  Chronic and stable. Recently evaluated by General Surgery; no intervention recommended     Physical deconditioning in context of acute on chronic medical illness  - PT/OT recommending TCU           Diet: Fluid restriction 1500 ML FLUID  Snacks/Supplements Pediatric: Ensure Enlive; Between Meals  2 Gram Sodium Diet    DVT Prophylaxis: Pneumatic Compression Devices  Leger Catheter: Not  present  Central Lines: None  Code Status: No CPR- Do NOT Intubate      Disposition Plan   Expected discharge: 2 - 3 days, recommended to transitional care unit once safe disposition plan/ TCU bed available and ID work up complete.     The patient's care was discussed with the Bedside Nurse, Care Coordinator/ and Patient.    Time Spent on this Encounter   Over 35 minutes was spent examining and discussing the patient with greater than 50% time spent in counseling and/or coordination of care.     Godfrey Vasques,   Hospitalist Service  [facility]  Securely message with the Vocera Web Console (learn more here)  Text page via [facility] Paging/Directory      Risk Factors Present on Admission                ______________________________________________________________________    Interval History   Patient seen and examined. No acute events over night.  No further fevers.  Tolerated his paracentesis well. Tolerating diet. No pain currently.  No difficulty breathing.     Data reviewed today: I reviewed all medications, new labs and imaging results over the last 24 hours. I personally reviewed no images or EKG's today.    Physical Exam   Vital Signs: Temp: 96.9  F (36.1  C) Temp src: Oral BP: 134/59 Pulse: 62   Resp: 16 SpO2: 95 % O2 Device: None (Room air)    Weight: 166 lbs 3.2 oz  General Appearance: Resting comfortably.  NAD   Respiratory: Clear to auscultation.  No respiratory distress  Cardiovascular: RRR.  No obvious murmurs  GI: Soft.  Mildly distended.  Non-tender  Skin: No obvious rashes or cyanosis  Other: Trace lower extremity edema.  No calf tenderness      Data   Recent Labs   Lab 07/08/21  1057 07/07/21  0827 07/06/21  0732 07/04/21  0809   WBC 2.9*  --  2.9* 3.5*   HGB 7.2*  --  8.4* 8.0*   MCV 95  --  93 97   *  --  134* 129*    134 135 136   POTASSIUM 3.8 4.0 4.0 4.1   CHLORIDE 103 103 104 107   CO2 23 26 23 21   BUN 33* 32* 34* 32*   CR 2.82* 2.84*  2.81* 2.58*   ANIONGAP 7 5 8 8   KEV 8.1* 8.4* 8.5 8.2*   * 108* 88 176*   ALBUMIN  --  3.1* 2.9* 2.9*   PROTTOTAL  --   --  6.3* 6.2*   BILITOTAL  --   --  0.5 1.0   ALKPHOS  --   --  181* 170*   ALT  --   --  20 22   AST  --   --  21 26     Recent Results (from the past 24 hour(s))   US Paracentesis    Narrative    US PARACENTESIS 7/7/2021 3:19 PM     HISTORY: High volume paracentesis with or without diagnostic fluid  analysis with labs to be drawn if ordered. Total paracentesis volume  as much as possible.    FINDINGS: Ultrasound was used to evaluate for the presence and best  approach for paracentesis. Written and oral informed consent was  obtained. A pause for the cause procedure to verify the correct  patient and correct procedure. The skin overlying the left lower  quadrant was prepped and draped in the usual sterile fashion. The  subcutaneous tissues were anesthetized with 10mL 1% Lidocaine . A  catheter was advanced into the peritoneal space and 3 L of  straw  colored fluid was drained. There were no immediate complications.  Ultrasound images were permanently stored.  Patient left the  ultrasound suite in satisfactory condition.      Impression    IMPRESSION: Technically successful paracentesis without immediate  complications.    TACO LINDA MD         SYSTEM ID:  [id number]

## 2021-07-08 NOTE — PLAN OF CARE
A&Ox4. Some forgetfulness @ times. VSS, on RA. Afebrile overnight. BC came back (+) from 7/7. MD updated and added Ampicillin. New PIV placed on shift; SL. C/o pain in lower ABD. PRN Oxycodone given; effective. ABD soft/tender. Paracentesis site covered with band-aide, C/D/I. LS diminished. Up SBA and walker. Protective mepi on coccyx; encouraging T&R. Patient needs reminders. 1500 mL FR. Discharge pending placement.     Addendum: Patient unable to urinate this AM. Tried for a while with no success using the urinal. Bladder scanned for 694. Passed onto oncoming nurse the need for patient to be straight cathed per orders.

## 2021-07-08 NOTE — CONSULTS
[facility]    Infectious Disease Consultation     Date of Admission:  6/25/2021  Date of Consult : 07/08/21    Assessment :  1. Enterococcal sepsis, likely of abdominal origin. Bacteremia is low grade with 1/2 blood cxs + on 7/7, prior negative blood cxs on 6/13. Given new fever and bacteremia, do not suspect endocarditis at this time  2. ESLD with decompensated cirrhosis, portal hypertension and ascites. S/p serial paracentesis. Peritoneal fluid analysis not suggestive of SBP  3. Chronic alcoholism  4. Cytopenias due to end stage liver disease  5. CKD  6. Chronic medical conditions -COPD, CAD, depression, HTN, hyperlipidemia, PVD, JANIS    Recommendations:  1. Follow repeat blood cxs and sensitivities  2. Discontinue Ceftriaxone  3. Ampicillin 2 grams IV Q8H.(renal dosing discussed with Rush)  4. For systemic infections, treatment would be with IV antibiotics. He will need at least 2 weeks of IV antibiotics , possibly longer based upon his clinical course.  ID will continue to follow    Hattie Schmidt MD    Reason for Consult    I was asked to evaluate this patient for antimicrobial recommendations for Enterococcal sepsis.    Primary Care Physician   FERNANDA BRANTLEY    Chief Complaint   Abdominal pain with underlying cirrhosis    History is obtained from the patient and medical records    History of Present Illness   Jim Richard is a 66 year old male with chronic alcoholism and decompensated cirrhosis with ascites who received weekly paracentesis. He has been hospitalized since 6/25 with abdominal pain and has had paracentesis X 2 since then without evidence of peritonitis. He developed a fever of 101.2 on 7/6 with blood cxs being positive for E.fecalis. Patient has been started on Ampicillin and ID has been asked to assist with antibiotic management.    He denies significant abdominal pain, no urinary symptoms. Did not have fever, night sweats prior to admisison    Past Medical  History   I have reviewed this patient's medical history and updated it with pertinent information if needed.   Past Medical History:   Diagnosis Date     Alcoholism (H) 1/14/2013    had treatment at [facility]     Anxiety      COPD (chronic obstructive pulmonary disease) (H)      Coronary artery disease 09/09/2014    Nuclear stress test with slight fixed defect inferiorly, no ischemia     Depression     w/anxiety     Esophageal varices with bleeding 04/28/2018    6 varices banded on EGD     Heart attack (H)      Hepatomegaly     Fatty liver, chronic alcoholic     Hyperlipemia      Hypertension      JANIS on CPAP      Peripheral vascular disease (H)      PVD (peripheral vascular disease) (H)      SDH (subdural hematoma) (H) 04/26/2018    Right side, resolved spontaneously     Spinal stenosis      Substance abuse (H)        Past Surgical History   I have reviewed this patient's surgical history and updated it with pertinent information if needed.  Past Surgical History:   Procedure Laterality Date     APPENDECTOMY       BYPASS GRAFT FEMOROPOPLITEAL  6/12/2012    Procedure: BYPASS GRAFT FEMOROPOPLITEAL;  LEFT FEMORAL TO ABOVE KNEE POPLITEAL BYPASS, ENDARTERECTOMY OF SFA AND PF ARTERIES, LEFT EXTERNAL ILIAC TO COMMON FEMORAL INTERPOSITION GRAFT;  Surgeon: Damir Roberts MD;  Location:  OR     COLONOSCOPY       COLONOSCOPY N/A 8/8/2019    Procedure: COLONOSCOPY;  Surgeon: Pavan Arguello MD;  Location:  GI     COLONOSCOPY N/A 3/10/2020    Procedure: COLONOSCOPY;  Surgeon: Rosendo Shaw MD;  Location:  GI     ENDARTERECTOMY FEMORAL  6/12/2012    Procedure: ENDARTERECTOMY FEMORAL;;  Surgeon: Damir Roberts MD;  Location:  OR     ESOPHAGOSCOPY, GASTROSCOPY, DUODENOSCOPY (EGD), COMBINED N/A 3/22/2018    Procedure: COMBINED ESOPHAGOSCOPY, GASTROSCOPY, DUODENOSCOPY (EGD);  ESOPHAGOSCOPY, GASTROSCOPY, DUODENOSOCPY.;  Surgeon: Valeri Pruitt MD;  Location:  OR     ESOPHAGOSCOPY, GASTROSCOPY,  DUODENOSCOPY (EGD), COMBINED N/A 9/29/2018    Procedure: COMBINED ESOPHAGOSCOPY, GASTROSCOPY, DUODENOSCOPY (EGD);;  Surgeon: Rosendo Shaw MD;  Location:  GI     ESOPHAGOSCOPY, GASTROSCOPY, DUODENOSCOPY (EGD), COMBINED N/A 8/2/2019    Procedure: ESOPHAGOGASTRODUODENOSCOPY (EGD);  Surgeon: Pavan Arguello MD;  Location:  GI     ESOPHAGOSCOPY, GASTROSCOPY, DUODENOSCOPY (EGD), COMBINED N/A 9/25/2019    Procedure: ESOPHAGOGASTRODUODENOSCOPY (EGD);  Surgeon: Pavan Arguello MD;  Location:  GI     ESOPHAGOSCOPY, GASTROSCOPY, DUODENOSCOPY (EGD), COMBINED N/A 3/8/2020    Procedure: ESOPHAGOGASTRODUODENOSCOPY (EGD);  Surgeon: Rosendo Shaw MD;  Location:  GI     ESOPHAGOSCOPY, GASTROSCOPY, DUODENOSCOPY (EGD), COMBINED N/A 6/11/2020    Procedure: ESOPHAGOGASTRODUODENOSCOPY (EGD);  Surgeon: Rosendo Shaw MD;  Location:  GI     ESOPHAGOSCOPY, GASTROSCOPY, DUODENOSCOPY (EGD), COMBINED N/A 1/23/2021    Procedure: ESOPHAGOGASTRODUODENOSCOPY (EGD);  Surgeon: Pavan Arguello MD;  Location:  GI     HERNIA REPAIR  2017    Abdominal     LAMINECTOMY, FUSION LUMBAR THREE+ LEVEL, COMBINED N/A 9/22/2014    Procedure: COMBINED LAMINECTOMY, FUSION LUMBAR THREE+ LEVEL;  Surgeon: Sebastien Pruitt MD;  Location:  OR     TONSILLECTOMY & ADENOIDECTOMY       US PARACENTESIS  4/4/2021     US PARACENTESIS  4/19/2021       Prior to Admission Medications   Prior to Admission Medications   Prescriptions Last Dose Informant Patient Reported? Taking?   QUEtiapine (SEROQUEL) 200 MG tablet  Self Yes Yes   Sig: Take 200 mg by mouth At Bedtime Filled 1/11/21 #30    QUEtiapine (SEROQUEL) 50 MG tablet prn med Self No Yes   Sig: Take 1 tablet (50 mg) by mouth 3 times daily as needed (anxiety) Filled 11/20/20 #30   albuterol (PROAIR HFA/PROVENTIL HFA/VENTOLIN HFA) 108 (90 Base) MCG/ACT inhaler prn med Self Yes Yes   Sig: Inhale 1-2 puffs into the lungs every 4 hours as needed for shortness of breath / dyspnea or  wheezing   eszopiclone (LUNESTA) 3 MG tablet prn med Self Yes Yes   Sig: Take 3 mg by mouth nightly as needed for sleep   fluticasone-vilanterol (BREO ELLIPTA) 200-25 MCG/INH inhaler prn med Self No Yes   Sig: Inhale 1 puff into the lungs daily as needed (SOB)   folic acid (FOLVITE) 1 MG tablet Past Week at Unknown time Self Yes Yes   Sig: Take 1 mg by mouth daily   gabapentin (NEURONTIN) 300 MG capsule Past Week at Unknown time Self Yes Yes   Sig: Take 300 mg by mouth 3 times daily   magnesium oxide (MAG-OX) 400 (240 Mg) MG tablet Past Week at Unknown time Self Yes Yes   Sig: Take 400 mg by mouth daily   midodrine (PROAMATINE) 2.5 MG tablet Past Week at Unknown time Self Yes Yes   Sig: Take 2.5 mg by mouth 3 times daily   mirtazapine (REMERON) 30 MG tablet Past Week at Unknown time Self Yes Yes   Sig: Take 30 mg by mouth At Bedtime Filled 1/11/21 for #30   multivitamin w/minerals (THERA-VIT-M) tablet Past Week at Unknown time Self No Yes   Sig: Take 1 tablet by mouth daily   Patient taking differently: Take 1 tablet by mouth daily Filled 9/21/21 #30   nicotine (NICODERM CQ) 21 MG/24HR 24 hr patch  Self Yes No   Sig: Place 1 patch onto the skin every 24 hours   pantoprazole (PROTONIX) 40 MG EC tablet  Self No Yes   Sig: Take 1 tablet (40 mg) by mouth 2 times daily   tamsulosin (FLOMAX) 0.4 MG capsule Past Week at Unknown time Self No Yes   Sig: Take 2 capsules (0.8 mg) by mouth daily   traZODone (DESYREL) 100 MG tablet Past Week at Unknown time Self Yes Yes   Sig: Take 100 mg by mouth At Bedtime Filled 1/11/21 #30   vortioxetine (TRINTELLIX) 10 MG tablet Past Month at Unknown time Self Yes Yes   Sig: Take 10 mg by mouth 2 times daily      Facility-Administered Medications: None     Allergies   Allergies   Allergen Reactions     Amlodipine Swelling     Lisinopril      Other reaction(s): Angioedema  Mouth and tongue swelling   Mouth and tongue swelling          Immunization History   Immunization History    Administered Date(s) Administered     Influenza (High Dose) 3 valent vaccine 09/28/2019     Influenza Quad, Recombinant, p-free (RIV4) 10/04/2018     Influenza, Quad, High Dose, Pf, 65yr + 10/17/2020     Pneumococcal 23 valent 01/13/2015     Tdap (Adacel,Boostrix) 03/10/2021       Social History   I have reviewed this patient's social history and updated it with pertinent information if needed. Jim CALDERON Jose Manuel  reports that he has been smoking cigarettes. He has been smoking about 1.00 pack per day. He has never used smokeless tobacco. He reports current alcohol use. He reports that he does not use drugs.    Family History   I have reviewed this patient's family history and updated it with pertinent information if needed.   Family History   Problem Relation Age of Onset     Mental Illness Son      Coronary Artery Disease Early Onset Mother      Substance Abuse Father      Lung Cancer Father      Substance Abuse Brother      Unknown/Adopted No family hx of      Depression No family hx of      Anxiety Disorder No family hx of      Schizophrenia No family hx of      Bipolar Disorder No family hx of      Suicide No family hx of      Dementia No family hx of      Shackelford Disease No family hx of      Parkinsonism No family hx of      Autism Spectrum Disorder No family hx of      Intellectual Disability (Mental Retardation) No family hx of        Review of Systems   The 10 point Review of Systems is negative other than noted in the HPI or here.     Physical Exam   Temp: 96.9  F (36.1  C) Temp src: Oral BP: 132/54 Pulse: 77   Resp: 16 SpO2: 95 % O2 Device: None (Room air)    Vital Signs with Ranges  Temp:  [96.9  F (36.1  C)-97.9  F (36.6  C)] 96.9  F (36.1  C)  Pulse:  [55-77] 77  Resp:  [16] 16  BP: (114-137)/(54-62) 132/54  SpO2:  [95 %-96 %] 95 %  166 lbs 3.2 oz  Body mass index is 26.03 kg/m .    GENERAL APPEARANCE:  Awake, chronically ill appearing male  EYES: Eyes grossly normal to inspection, PERRL and  conjunctivae and sclerae normal  NECK: no adenopathy  RESP: lungs clear to auscultation - no rales, rhonchi or wheezes  CV: regular rates and rhythmand no murmur, click or rub  ABDOMEN: Distended, clinical ascites, umbilical hernia.  MS: extremities normal- no gross deformities noted  SKIN: no suspicious lesions or rashes      Data   All laboratory data reviewed  Component      Latest Ref Rng & Units 7/7/2021   Body Fluid Analysis Source       Ascites   Color Fluid       Yellow   Appearance Fluid       Slightly Cloudy   WBC Fluid      /uL 327   % Neutrophils Fluid      % 9   % Lymphocytes Fluid      % 14   % Mono/Macro Fluid      % 12   % Other Cells Fluid      % 65   Albumin Fluid Source       Ascites   Albumin Fluid      g/dL 0.5   Protein Total Fluid Source       Ascites   Protein Total Fluid      g/dL 1.1     Recent Labs   Lab 07/07/21  1442 07/07/21  0257 07/07/21  0249   CULT Culture negative monitoring continues  Culture negative monitoring continues Cultured on the 1st day of incubation:  Enterococcus faecalis  *  Critical Value/Significant Value, preliminary result only, called to and read back by  Veronica Gomez RN @ 2152. 7/7/21. AV    Culture in progress  (Note)  POSITIVE for ENTEROCOCCUS FAECALIS and NEGATIVE for Michael/vanB genes  by [employer] multiplex nucleic acid test. Final identification and  antimicrobial susceptibility testing will be verified by standard  methods.    Specimen tested with Verigene multiplex, gram-positive blood culture  nucleic acid test for the following targets: Staph aureus, Staph  epidermidis, Staph lugdunensis, other Staph species, Enterococcus  faecalis, Enterococcus faecium, Streptococcus species, S. agalactiae,  S. anginosus grp., S. pneumoniae, S. pyogenes, Listeria sp., mecA  (methicillin resistance) and Michael/B (vancomycin resistance).    Critical Value/Significant Value called to and read back by Charity Ladd RN  07.08.21 CF   No growth after 22 hours      Recent Labs   Lab Test 07/07/21  1442 07/07/21  0257 07/07/21  0249 06/29/21  1355 06/13/21  0849 06/13/21  0848 06/06/21  1051 05/01/21  1326 03/10/21  1130   CULT Culture negative monitoring continues  Culture negative monitoring continues Cultured on the 1st day of incubation:  Enterococcus faecalis  *  Critical Value/Significant Value, preliminary result only, called to and read back by  Veronica Gomez RN @ 2152. 7/7/21. AV    Culture in progress  (Note)  POSITIVE for ENTEROCOCCUS FAECALIS and NEGATIVE for Michael/vanB genes  by [employer] multiplex nucleic acid test. Final identification and  antimicrobial susceptibility testing will be verified by standard  methods.    Specimen tested with Verigene multiplex, gram-positive blood culture  nucleic acid test for the following targets: Staph aureus, Staph  epidermidis, Staph lugdunensis, other Staph species, Enterococcus  faecalis, Enterococcus faecium, Streptococcus species, S. agalactiae,  S. anginosus grp., S. pneumoniae, S. pyogenes, Listeria sp., mecA  (methicillin resistance) and Michael/B (vancomycin resistance).    Critical Value/Significant Value called to and read back by Charity Ladd RN  07.08.21 CF   No growth after 22 hours No anaerobes isolated  No growth No growth No growth No growth No growth No growth     7/7 CXR  CHEST TWO VIEWS 7/7/2021 12:21 PM      HISTORY: Fever and cough     COMPARISON: June 25, 2021                                                                       IMPRESSION: Opacity at the left base and elevated left hemidiaphragm  are stable. No definite right-sided infiltrates. Rib deformity stable  on the right. The cardiac silhouette is not enlarged. Pulmonary  vasculature is unremarkable.    6/29 CT abdomen/pelvis  CT ABDOMEN PELVIS W/O CONTRAST 6/29/2021 2:34 PM     CLINICAL HISTORY: LLQ abdominal pain  TECHNIQUE: CT scan of the abdomen and pelvis was performed without IV  contrast. Multiplanar reformats were  obtained. Dose reduction  techniques were used.  CONTRAST: None.     COMPARISON: 10/6/2020     FINDINGS:   LOWER CHEST: Mild left basilar dependent atelectasis. Large  paraesophageal varices.     HEPATOBILIARY: Cirrhosis. Evaluation for masses limited on noncontrast  CT. No calcified gallstones or biliary ductal dilatation.     PANCREAS: Normal.     SPLEEN: Splenomegaly.     ADRENAL GLANDS: Normal.     KIDNEYS/BLADDER: No hydronephrosis or significant calculi. Left upper  pole simple cyst requiring no specific follow-up. The urinary bladder  is decompressed with a Leger catheter.     BOWEL: No obstruction or inflammatory change.     LYMPH NODES: No definite lymphadenopathy on this noncontrast CT.     VASCULATURE: No abdominal aortic aneurysm.     PELVIC ORGANS: No pelvic masses.     OTHER: Aortobiiliac atherosclerotic calcifications. Trace residual  ascites with fluid in the umbilicus. No fluid collections.     MUSCULOSKELETAL: Bilateral rib fractures with callus formation.  Degenerative changes in the spine and instrumented fusion in the  lumbar spine. No suspicious lesions in the bones.                                                                      IMPRESSION:   1.  No acute findings in the abdomen and pelvis on this noncontrast  CT.  2.  Cirrhosis and evidence of portal hypertension.

## 2021-07-08 NOTE — PROGRESS NOTES
[facility]    Nephrology Progress Note     Assessment & Plan     1) Cirrhosis with ascites and portal HTN:  On basis of ETOH use.  Frequent paracentesis.       2) CKD 4:  GFR has been 25-30 over the last month or so.  This is likely hepatorenal syndrome type 2. The trend has been steady decline.  GFR 22 last few days.  This may have been affected by sepsis/bacteremia.       3) Ascites: He has needed weekly paracenteses.      4) Pancytopenia:  Due to portal HTN and hypersplenism.     Plan:     Nothing to add.             Dez Mann MD  [facility] - Nephrology  [phone]    Interval History     Voiding OK  S/P paracentesis yesterday - 3 L  BC show enterococcus.    Now on ampicillin and ceftriaxone.      Physical Exam   Temp: 96.9  F (36.1  C) Temp src: Oral BP: 132/54 Pulse: 77   Resp: 16 SpO2: 95 % O2 Device: None (Room air)    Vitals:    07/04/21 0424 07/07/21 0605 07/08/21 0700   Weight: 77.2 kg (170 lb 3.2 oz) 79.7 kg (175 lb 12.8 oz) 75.4 kg (166 lb 3.2 oz)     Vital Signs with Ranges  Temp:  [96.9  F (36.1  C)-97.9  F (36.6  C)] 96.9  F (36.1  C)  Pulse:  [55-77] 77  Resp:  [14-16] 16  BP: (114-142)/(54-64) 132/54  SpO2:  [95 %-96 %] 95 %  I/O last 3 completed shifts:  In: 480 [P.O.:480]  Out: 775 [Urine:775]    GENERAL APPEARANCE: sleepy  HEENT:  Eyes/ears/nose/neck grossly normal  RESP: lungs cta b c good efforts, no crackles, rhonchi or wheezes  CV: RRR, nl S1/S2, no m/r/g   ABDOMEN: distended, soft  EXTREMITIES/SKIN: no rashes/lesions; tr edema    Medications       ampicillin  1 g Intravenous Q6H     cefTRIAXone  1 g Intravenous Q24H     finasteride  5 mg Oral Daily     folic acid  1 mg Oral Daily     gabapentin  200 mg Oral TID     lactulose  20 g Oral BID     magnesium oxide  400 mg Oral Daily     miconazole   Topical BID     midodrine  10 mg Oral TID w/meals     mirtazapine  30 mg Oral At Bedtime     multivitamin w/minerals  1 tablet Oral Daily     nicotine  1  patch Transdermal Q24H     nicotine   Transdermal Q8H     pantoprazole  40 mg Oral BID     QUEtiapine  200 mg Oral At Bedtime     sodium chloride (PF)  3 mL Intracatheter Q8H     tamsulosin  0.8 mg Oral Daily     thiamine  100 mg Oral Daily     traZODone  100 mg Oral At Bedtime     vortioxetine  10 mg Oral BID       Data   BMP  Recent Labs   Lab 07/08/21  1057 07/07/21  0827 07/06/21  0732 07/04/21  0809    134 135 136   POTASSIUM 3.8 4.0 4.0 4.1   CHLORIDE 103 103 104 107   KEV 8.1* 8.4* 8.5 8.2*   CO2 23 26 23 21   BUN 33* 32* 34* 32*   CR 2.82* 2.84* 2.81* 2.58*   * 108* 88 176*     [email or web address](phos[facility]  CBC)  Recent Labs   Lab 07/08/21  1057 07/06/21  0732 07/04/21  0809 07/02/21  0816   WBC 2.9* 2.9* 3.5* 3.5*   HGB 7.2* 8.4* 8.0* 8.2*   HCT 22.2* 26.3* 25.7* 25.5*   MCV 95 93 97 96   * 134* 129* 112*     Recent Labs   Lab 07/06/21  0732 07/02/21  0816 07/02/21  0816   AST 21   < > 25   ALT 20   < > 22   ALKPHOS 181*   < > 185*   BILITOTAL 0.5   < > 0.9   ELPIDIO  --   --  39    < > = values in this interval not displayed.       Attestation:   I have reviewed today's relevant vital signs, notes, medications, labs and imaging.

## 2021-07-08 NOTE — PLAN OF CARE
Patient A&0x4. Forgetful at times. Patient up w SBA to chair, GB and walker if going longer distances.  Straight cathed for urine x1 this shift. Abdomin is distended but soft, tender to touch. Paracentesis sites are CDI and open to air. Lungs clear. Traced edema on bilateral legs and feet. Denies pain this shift.

## 2021-07-08 NOTE — PROVIDER NOTIFICATION
MD Notification    Notified Person: MD    Notified Person Name: Dr. Meier paged x3; no call back. Paged Dr. Flores     Notification Date/Time: 2154 7/7/2021. Paged Dr. Flores @ 0025    Notification Interaction: page    Purpose of Notification: 833-2. R.S. Positive blood cultures from 7/7 in the left hand showing gram positive cocci. On Rocephin currently. Please advise. Thanks! -VASQUEZ Hdz    Orders Received: continue current antibiotic     Comments:

## 2021-07-08 NOTE — PROVIDER NOTIFICATION
MD Notification    Notified Person: MD    Notified Person Name: Dr. Flores     Notification Date/Time: 0100 7/8/2021    Notification Interaction: Page     Purpose of Notification: Blood cultures from 7/7 left hand now growing E. Saecalis    Orders Received: Added Ampicillin     Comments:

## 2021-07-08 NOTE — PROGRESS NOTES
Care Management Follow Up    Length of Stay (days): 12    Expected Discharge Date: 07/11/21(TCU)     Concerns to be Addressed: discharge planning     Patient plan of care discussed at interdisciplinary rounds: Yes    Anticipated Discharge Disposition:       Anticipated Discharge Services:    Anticipated Discharge DME:      Patient/family educated on Medicare website which has current facility and service quality ratings:    Education Provided on the Discharge Plan:    Patient/Family in Agreement with the Plan:      Referrals Placed by CM/SW:    Private pay costs discussed: Not applicable    Additional Information:  SW placed call to [facility] to obtain a copy of patient's commitment paperwork.  Left VM.  Dania with [facility] is reviewing and will follow up if they are able to accept patient at their facility.  Provided update to patient.     MIRTA Navarro

## 2021-07-09 LAB
ANION GAP SERPL CALCULATED.3IONS-SCNC: 6 MMOL/L (ref 3–14)
BUN SERPL-MCNC: 32 MG/DL (ref 7–30)
CALCIUM SERPL-MCNC: 8.5 MG/DL (ref 8.5–10.1)
CHLORIDE SERPL-SCNC: 104 MMOL/L (ref 94–109)
CO2 SERPL-SCNC: 25 MMOL/L (ref 20–32)
CREAT SERPL-MCNC: 2.91 MG/DL (ref 0.66–1.25)
GFR SERPL CREATININE-BSD FRML MDRD: 21 ML/MIN/{1.73_M2}
GLUCOSE SERPL-MCNC: 95 MG/DL (ref 70–99)
POTASSIUM SERPL-SCNC: 4.2 MMOL/L (ref 3.4–5.3)
SODIUM SERPL-SCNC: 135 MMOL/L (ref 133–144)

## 2021-07-09 PROCEDURE — 36415 COLL VENOUS BLD VENIPUNCTURE: CPT | Performed by: INTERNAL MEDICINE

## 2021-07-09 PROCEDURE — 99232 SBSQ HOSP IP/OBS MODERATE 35: CPT | Performed by: INTERNAL MEDICINE

## 2021-07-09 PROCEDURE — 250N000013 HC RX MED GY IP 250 OP 250 PS 637: Performed by: INTERNAL MEDICINE

## 2021-07-09 PROCEDURE — 250N000013 HC RX MED GY IP 250 OP 250 PS 637: Performed by: HOSPITALIST

## 2021-07-09 PROCEDURE — 120N000001 HC R&B MED SURG/OB

## 2021-07-09 PROCEDURE — 250N000011 HC RX IP 250 OP 636: Performed by: SPECIALIST

## 2021-07-09 PROCEDURE — 87040 BLOOD CULTURE FOR BACTERIA: CPT | Performed by: INTERNAL MEDICINE

## 2021-07-09 PROCEDURE — P9047 ALBUMIN (HUMAN), 25%, 50ML: HCPCS | Performed by: INTERNAL MEDICINE

## 2021-07-09 PROCEDURE — 87040 BLOOD CULTURE FOR BACTERIA: CPT | Performed by: SPECIALIST

## 2021-07-09 PROCEDURE — 80048 BASIC METABOLIC PNL TOTAL CA: CPT | Performed by: INTERNAL MEDICINE

## 2021-07-09 PROCEDURE — 36415 COLL VENOUS BLD VENIPUNCTURE: CPT | Performed by: SPECIALIST

## 2021-07-09 PROCEDURE — 250N000011 HC RX IP 250 OP 636: Performed by: INTERNAL MEDICINE

## 2021-07-09 PROCEDURE — 99233 SBSQ HOSP IP/OBS HIGH 50: CPT | Performed by: INTERNAL MEDICINE

## 2021-07-09 RX ORDER — AMPICILLIN 2 G/1
2 INJECTION, POWDER, FOR SOLUTION INTRAVENOUS EVERY 8 HOURS
DISCHARGE
Start: 2021-07-09 | End: 2021-07-21

## 2021-07-09 RX ORDER — ALBUMIN (HUMAN) 12.5 G/50ML
12.5 SOLUTION INTRAVENOUS EVERY 8 HOURS
Status: COMPLETED | OUTPATIENT
Start: 2021-07-09 | End: 2021-07-11

## 2021-07-09 RX ADMIN — AMPICILLIN SODIUM 2 G: 2 INJECTION, POWDER, FOR SOLUTION INTRAVENOUS at 03:25

## 2021-07-09 RX ADMIN — ALBUMIN HUMAN 12.5 G: 0.25 SOLUTION INTRAVENOUS at 12:42

## 2021-07-09 RX ADMIN — FINASTERIDE 5 MG: 5 TABLET, FILM COATED ORAL at 08:06

## 2021-07-09 RX ADMIN — FOLIC ACID 1 MG: 1 TABLET ORAL at 08:06

## 2021-07-09 RX ADMIN — TAMSULOSIN HYDROCHLORIDE 0.8 MG: 0.4 CAPSULE ORAL at 08:06

## 2021-07-09 RX ADMIN — PANTOPRAZOLE SODIUM 40 MG: 40 TABLET, DELAYED RELEASE ORAL at 08:06

## 2021-07-09 RX ADMIN — QUETIAPINE FUMARATE 50 MG: 25 TABLET ORAL at 13:30

## 2021-07-09 RX ADMIN — MICONAZOLE NITRATE: 20 POWDER TOPICAL at 20:22

## 2021-07-09 RX ADMIN — LACTULOSE 20 G: 10 SOLUTION ORAL at 20:21

## 2021-07-09 RX ADMIN — LACTULOSE 20 G: 10 SOLUTION ORAL at 08:06

## 2021-07-09 RX ADMIN — GABAPENTIN 200 MG: 100 CAPSULE ORAL at 08:06

## 2021-07-09 RX ADMIN — MIDODRINE HYDROCHLORIDE 10 MG: 5 TABLET ORAL at 18:03

## 2021-07-09 RX ADMIN — VORTIOXETINE 10 MG: 10 TABLET, FILM COATED ORAL at 20:21

## 2021-07-09 RX ADMIN — AMPICILLIN SODIUM 2 G: 2 INJECTION, POWDER, FOR SOLUTION INTRAVENOUS at 20:21

## 2021-07-09 RX ADMIN — NICOTINE 1 PATCH: 21 PATCH, EXTENDED RELEASE TRANSDERMAL at 22:06

## 2021-07-09 RX ADMIN — QUETIAPINE FUMARATE 50 MG: 25 TABLET ORAL at 18:03

## 2021-07-09 RX ADMIN — TRAZODONE HYDROCHLORIDE 100 MG: 100 TABLET ORAL at 22:06

## 2021-07-09 RX ADMIN — GABAPENTIN 200 MG: 100 CAPSULE ORAL at 22:07

## 2021-07-09 RX ADMIN — PANTOPRAZOLE SODIUM 40 MG: 40 TABLET, DELAYED RELEASE ORAL at 20:21

## 2021-07-09 RX ADMIN — ALBUMIN HUMAN 12.5 G: 0.25 SOLUTION INTRAVENOUS at 19:43

## 2021-07-09 RX ADMIN — GABAPENTIN 200 MG: 100 CAPSULE ORAL at 18:03

## 2021-07-09 RX ADMIN — OXYCODONE HYDROCHLORIDE 5 MG: 5 TABLET ORAL at 01:00

## 2021-07-09 RX ADMIN — MAGNESIUM OXIDE TAB 400 MG (241.3 MG ELEMENTAL MG) 400 MG: 400 (241.3 MG) TAB at 08:06

## 2021-07-09 RX ADMIN — VORTIOXETINE 10 MG: 10 TABLET, FILM COATED ORAL at 08:06

## 2021-07-09 RX ADMIN — MULTIPLE VITAMINS W/ MINERALS TAB 1 TABLET: TAB at 08:06

## 2021-07-09 RX ADMIN — OXYCODONE HYDROCHLORIDE 10 MG: 5 TABLET ORAL at 11:12

## 2021-07-09 RX ADMIN — MIDODRINE HYDROCHLORIDE 10 MG: 5 TABLET ORAL at 08:06

## 2021-07-09 RX ADMIN — MIDODRINE HYDROCHLORIDE 10 MG: 5 TABLET ORAL at 11:12

## 2021-07-09 RX ADMIN — THIAMINE HCL TAB 100 MG 100 MG: 100 TAB at 08:06

## 2021-07-09 RX ADMIN — QUETIAPINE FUMARATE 50 MG: 25 TABLET ORAL at 08:53

## 2021-07-09 RX ADMIN — MIRTAZAPINE 30 MG: 15 TABLET, FILM COATED ORAL at 22:06

## 2021-07-09 RX ADMIN — AMPICILLIN SODIUM 2 G: 2 INJECTION, POWDER, FOR SOLUTION INTRAVENOUS at 11:12

## 2021-07-09 RX ADMIN — QUETIAPINE 200 MG: 200 TABLET, FILM COATED ORAL at 22:06

## 2021-07-09 RX ADMIN — OXYCODONE HYDROCHLORIDE 10 MG: 5 TABLET ORAL at 18:03

## 2021-07-09 ASSESSMENT — ACTIVITIES OF DAILY LIVING (ADL)
ADLS_ACUITY_SCORE: 13

## 2021-07-09 ASSESSMENT — MIFFLIN-ST. JEOR: SCORE: 1514.27

## 2021-07-09 NOTE — PROGRESS NOTES
[facility]    Infectious Disease Progress Note    Date of Service : 07/09/2021     Assessment :  1. Enterococcal sepsis, likely of abdominal origin. Bacteremia is low grade with 1/2 blood cxs + on 7/7, prior negative blood cxs on 6/13. Also staph sp in blood cx pending identification. Do not suspect endocarditis with low grade and new onset bacteremia  2. ESLD with decompensated cirrhosis, portal hypertension and ascites. S/p serial paracentesis. Peritoneal fluid analysis not suggestive of SBP  3. Chronic alcoholism  4. Cytopenias due to end stage liver disease  5. CKD  6. Chronic medical conditions -COPD, CAD, depression, HTN, hyperlipidemia, PVD, JANIS    Recommendations  1. Ampicillin 2 grams Q8H renal adjusted dose X 2 weeks until 7/21/2021 (IV antibiotics ordered in Epic)  2. Follow blood cx for staph sp- likely contaminant  3. Repeat blood cxs negative so far  4. IV access once blood cxs are negative    Hattie Schmidt MD    Interval History   Feels ok, remained afebrile. Continues to complain of abdominal distention and recurrent ascites    Antimicrobial therapy  7/7 Ampicillin    Physical Exam   Temp: 97.4  F (36.3  C) Temp src: Oral BP: 134/59 Pulse: 66   Resp: 16 SpO2: 97 % O2 Device: None (Room air)    Vitals:    07/07/21 0605 07/08/21 0700 07/09/21 0500   Weight: 79.7 kg (175 lb 12.8 oz) 75.4 kg (166 lb 3.2 oz) 78.1 kg (172 lb 1.6 oz)     Vital Signs with Ranges  Temp:  [97.4  F (36.3  C)-98.1  F (36.7  C)] 97.4  F (36.3  C)  Pulse:  [60-67] 66  Resp:  [16] 16  BP: (123-134)/(56-59) 134/59  SpO2:  [94 %-97 %] 97 %    GENERAL APPEARANCE:  Awake, chronically ill appearing male  EYES: Eyes grossly normal to inspection, PERRL and conjunctivae and sclerae normal  NECK: no adenopathy  RESP: lungs clear to auscultation - no rales, rhonchi or wheezes  CV: regular rates and rhythmand no murmur, click or rub  ABDOMEN: Distended, clinical ascites, umbilical hernia.  MS: extremities normal- no  gross deformities noted  SKIN: no suspicious lesions or rashes    Other:    Medications       albumin human  12.5 g Intravenous Q8H     ampicillin  2 g Intravenous Q8H     finasteride  5 mg Oral Daily     folic acid  1 mg Oral Daily     gabapentin  200 mg Oral TID     lactulose  20 g Oral BID     magnesium oxide  400 mg Oral Daily     miconazole   Topical BID     midodrine  10 mg Oral TID w/meals     mirtazapine  30 mg Oral At Bedtime     multivitamin w/minerals  1 tablet Oral Daily     nicotine  1 patch Transdermal Q24H     nicotine   Transdermal Q8H     pantoprazole  40 mg Oral BID     QUEtiapine  200 mg Oral At Bedtime     sodium chloride (PF)  3 mL Intracatheter Q8H     tamsulosin  0.8 mg Oral Daily     thiamine  100 mg Oral Daily     traZODone  100 mg Oral At Bedtime     vortioxetine  10 mg Oral BID       Data   All microbiology laboratory data reviewed.  Recent Labs   Lab Test 07/08/21  1057 07/06/21  0732 07/04/21  0809   WBC 2.9* 2.9* 3.5*   HGB 7.2* 8.4* 8.0*   HCT 22.2* 26.3* 25.7*   MCV 95 93 97   * 134* 129*     Recent Labs   Lab Test 07/09/21  0828 07/08/21  1057 07/07/21  0827   CR 2.91* 2.82* 2.84*     No lab results found.  Recent Labs   Lab Test 07/07/21  1442 07/07/21  0257 07/07/21  0249 06/29/21  1355 06/13/21  0849 06/13/21  0848 06/06/21  1051 05/01/21  1326 03/10/21  1130   CULT Culture negative monitoring continues  Culture negative monitoring continues Cultured on the 1st day of incubation:  Enterococcus faecalis  Susceptibility testing in progress  *  Critical Value/Significant Value, preliminary result only, called to and read back by  Veronica Gomez RN @ 2152. 7/7/21. AV    Cultured on the 1st day of incubation:  Staphylococcus species  Speciation in progress  Susceptibility testing in progress  *  Critical Value/Significant Value, preliminary result only, called to and read back by  Mariya Pierre RN @ 1053.cg 07/09/21    (Note)  POSITIVE for ENTEROCOCCUS FAECALIS and  NEGATIVE for Michael/vanB genes  by Verigene multiplex nucleic acid test. Final identification and  antimicrobial susceptibility testing will be verified by standard  methods.    Specimen tested with Verigene multiplex, gram-positive blood culture  nucleic acid test for the following targets: Staph aureus, Staph  epidermidis, Staph lugdunensis, other Staph species, Enterococcus  faecalis, Enterococcus faecium, Streptococcus species, S. agalactiae,  S. anginosus grp., S. pneumoniae, S. pyogenes, Listeria sp., mecA  (methicillin resistance) and Michael/B (vancomycin resistance).    Critical Value/Significant Value called to and read back by Charity Ladd RN  07.08.21 CF   No growth after 2 days No anaerobes isolated  No growth No growth No growth No growth No growth No growth     7/7 CXR  CHEST TWO VIEWS 7/7/2021 12:21 PM      HISTORY: Fever and cough     COMPARISON: June 25, 2021                                                                       IMPRESSION: Opacity at the left base and elevated left hemidiaphragm  are stable. No definite right-sided infiltrates. Rib deformity stable  on the right. The cardiac silhouette is not enlarged. Pulmonary  vasculature is unremarkable.     6/29 CT abdomen/pelvis  CT ABDOMEN PELVIS W/O CONTRAST 6/29/2021 2:34 PM     CLINICAL HISTORY: LLQ abdominal pain  TECHNIQUE: CT scan of the abdomen and pelvis was performed without IV  contrast. Multiplanar reformats were obtained. Dose reduction  techniques were used.  CONTRAST: None.     COMPARISON: 10/6/2020     FINDINGS:   LOWER CHEST: Mild left basilar dependent atelectasis. Large  paraesophageal varices.     HEPATOBILIARY: Cirrhosis. Evaluation for masses limited on noncontrast  CT. No calcified gallstones or biliary ductal dilatation.     PANCREAS: Normal.     SPLEEN: Splenomegaly.     ADRENAL GLANDS: Normal.     KIDNEYS/BLADDER: No hydronephrosis or significant calculi. Left upper  pole simple cyst requiring no specific  follow-up. The urinary bladder  is decompressed with a Leger catheter.     BOWEL: No obstruction or inflammatory change.     LYMPH NODES: No definite lymphadenopathy on this noncontrast CT.     VASCULATURE: No abdominal aortic aneurysm.     PELVIC ORGANS: No pelvic masses.     OTHER: Aortobiiliac atherosclerotic calcifications. Trace residual  ascites with fluid in the umbilicus. No fluid collections.     MUSCULOSKELETAL: Bilateral rib fractures with callus formation.  Degenerative changes in the spine and instrumented fusion in the  lumbar spine. No suspicious lesions in the bones.                                                                      IMPRESSION:   1.  No acute findings in the abdomen and pelvis on this noncontrast  CT.  2.  Cirrhosis and evidence of portal hypertension.     Attestation:  Total time on the floor involved in the patient's care: 35 minutes. Total time spent in counseling/care coordination: >50%

## 2021-07-09 NOTE — PROGRESS NOTES
BRIEF NUTRITION REASSESSMENT      REASON FOR REASSESSMENT:  LOS follow-up      CURRENT DIET AND INTAKE:  Diet:  2gm Na, 1500 ml fluid restriction           4oz Ensure at 10am and 2pm              Chart reviewed  Pt currently meeting with MD  He has been ordering full meals  Breakfast today - omelet, potatoes, blueberry muffin, grapes, melon, soda  Lunch just ordered - chili, potatoes, grapes, pineapple, ice cream  Flowsheets reflect meal intake has been 100%   Uncertain if pt consuming the Ensure between meals    ANTHROPOMETRICS:  Vitals:    06/25/21 1920 06/25/21 2330 06/30/21 0616 07/01/21 0529   Weight: 86.2 kg (190 lb) 80.5 kg (177 lb 6.4 oz) 77.4 kg (170 lb 9.6 oz) 76.7 kg (169 lb)    07/04/21 0424 07/07/21 0605 07/08/21 0700 07/09/21 0500   Weight: 77.2 kg (170 lb 3.2 oz) 79.7 kg (175 lb 12.8 oz) 75.4 kg (166 lb 3.2 oz) 78.1 kg (172 lb 1.6 oz)     Wts have fluctuated with fluid  Pt having frequent paracentesis:  6/26: 5900 mL  6/29: 5200 mL  7/2:   3800 mL  7/7:   3000 mL    LABS:  Labs noted    MALNUTRITION:  Patient does not meet two of the criteria necessary for diagnosing malnutrition.         NUTRITION INTERVENTION:  Nutrition Diagnosis:  No nutrition diagnosis at this time.    Implementation:  Will continue with 4oz Ensure at 10am and 2pm    FOLLOW UP/MONITORING:   Will re-evaluate in 7 - 10 days, or sooner, if re-consulted.

## 2021-07-09 NOTE — PROGRESS NOTES
[facility]    Nephrology Progress Note     Assessment & Plan       1) Cirrhosis with ascites and portal HTN:  On basis of ETOH use.  Frequent paracentesis.       2) CKD 4:  GFR has been 25-30 over the last month or so.  This is likely hepatorenal syndrome type 2. The trend has been steady decline.  GFR down to 21 today.  This may have been affected by sepsis/bacteremia.       3) Ascites: He has needed weekly paracenteses.      4) Pancytopenia:  Due to portal HTN and hypersplenism.     Plan:     Add back 25% albumin.             Dez Mann MD  [facility] - Nephrology  [phone]    Interval History     He notes pain across low abdomen.  Urine out 1300 mL yesterday.  GFR trending down.  30 one month ago.  Now 21.     Physical Exam   Temp: 97.4  F (36.3  C) Temp src: Oral BP: 134/59 Pulse: 66   Resp: 16 SpO2: 97 % O2 Device: None (Room air)    Vitals:    07/07/21 0605 07/08/21 0700 07/09/21 0500   Weight: 79.7 kg (175 lb 12.8 oz) 75.4 kg (166 lb 3.2 oz) 78.1 kg (172 lb 1.6 oz)     Vital Signs with Ranges  Temp:  [97.4  F (36.3  C)-98.1  F (36.7  C)] 97.4  F (36.3  C)  Pulse:  [60-77] 66  Resp:  [16] 16  BP: (123-134)/(54-59) 134/59  SpO2:  [94 %-97 %] 97 %  I/O last 3 completed shifts:  In: 1320 [P.O.:1320]  Out: 1800 [Urine:1800]    GENERAL APPEARANCE: pleasant, NAD, a & o  HEENT:  Eyes/ears/nose/neck grossly normal  RESP: lungs cta b c good efforts, no crackles, rhonchi or wheezes  CV: RRR, nl S1/S2, no m/r/g   ABDOMEN: distended, soft, reducible umbilical hernia.    EXTREMITIES/SKIN: no rashes/lesions;  Tr edema    Medications       ampicillin  2 g Intravenous Q8H     finasteride  5 mg Oral Daily     folic acid  1 mg Oral Daily     gabapentin  200 mg Oral TID     lactulose  20 g Oral BID     magnesium oxide  400 mg Oral Daily     miconazole   Topical BID     midodrine  10 mg Oral TID w/meals     mirtazapine  30 mg Oral At Bedtime     multivitamin w/minerals  1 tablet Oral  Daily     nicotine  1 patch Transdermal Q24H     nicotine   Transdermal Q8H     pantoprazole  40 mg Oral BID     QUEtiapine  200 mg Oral At Bedtime     sodium chloride (PF)  3 mL Intracatheter Q8H     tamsulosin  0.8 mg Oral Daily     thiamine  100 mg Oral Daily     traZODone  100 mg Oral At Bedtime     vortioxetine  10 mg Oral BID       Data   BMP  Recent Labs   Lab 07/09/21  0828 07/08/21  1057 07/07/21  0827 07/06/21  0732    133 134 135   POTASSIUM 4.2 3.8 4.0 4.0   CHLORIDE 104 103 103 104   KEV 8.5 8.1* 8.4* 8.5   CO2 25 23 26 23   BUN 32* 33* 32* 34*   CR 2.91* 2.82* 2.84* 2.81*   GLC 95 134* 108* 88     [email or web address](phos:4)  CBC)  Recent Labs   Lab 07/08/21  1057 07/06/21  0732 07/04/21  0809   WBC 2.9* 2.9* 3.5*   HGB 7.2* 8.4* 8.0*   HCT 22.2* 26.3* 25.7*   MCV 95 93 97   * 134* 129*     Recent Labs   Lab 07/06/21  0732   AST 21   ALT 20   ALKPHOS 181*   BILITOTAL 0.5       Attestation:   I have reviewed today's relevant vital signs, notes, medications, labs and imaging.

## 2021-07-09 NOTE — PLAN OF CARE
A&Ox4, forgetful at times. VSS on RA, afebrile. Denies pain. Urinary hesitancy throughout the evening, bladder scanned per parameters, urinated once 200 out, continue to monitor per active orders. Up SBA/GB to chair. Abdomen distended with multiple paracentesis sites, scattered bruising. 2 g Na diet with 1500 mL fluid restriction. Removed nicotine patch from R shoulder, pt requests it be replaced tomorrow morning instead of tonight. Pt on commitment, TCU pending.

## 2021-07-09 NOTE — PLAN OF CARE
"PT-Attempted to see. Patient pleasantly refused due to just having eaten. \"Just after eating. No.\" Educated patient on importance of mobility but he continued to refuse.   "

## 2021-07-09 NOTE — PROVIDER NOTIFICATION
MD Notification    Notified Person: MD    Notified Person Name: Dr. Vasques    Notification Date/Time:1100    Notification Interaction:text paged    Purpose of Notification: positive blood cultures from 7-7-21 showing 2 organisms - Enterocoous and staph. Asking for advisement.     Orders Received:    Comments:

## 2021-07-09 NOTE — PROGRESS NOTES
[facility]    Medicine Progress Note - Hospitalist Service       Date of Admission:  6/25/2021    Assessment & Plan            Jim Richard is a 66 year old male with hx of alcoholic cirrhosis with ascites and ongoing alcohol use who was admitted on 6/25/2021 for recurrent ascites. Hospital course complicated by alcohol withdrawal which has now resolved. He is under an active commitment      Alcoholic cirrhosis with recurrent ascities  * Patient missed his weekly paracentesis and presented with markedly distended abdomen with associated pain and respiratory distress  * Underwent large-volume paracentesis (>5L removed) on 6/26, 6/29, and 7/2. No evidence of SBP. He is not on diuretics due to propensity for NGUYEN  * Tunneled peritoneal catheter was considered, but upon further discussion with IR was not felt to be a good candidate. Patient is currently under active commitment, but has longstanding history of non-compliance. Because it is unclear how long patient will be under commitment, decision was made to defer PleurX catheter placement  - Continues on therapeutic paracentesis prn. Last paracentesis 7/2. Reassess daily  - On 1500 mL fluid restriction  - He has been initiated on lactulose 20g BID for goal 2-3 BM daily  - Continues on PTA midodrine  - May need to consider another paracentesis prior to discharge or if symptoms worsen    Fever  Possible bacteremia   7/7:  Over night patient spiked a fever of 101.2 degrees.  He does note a cough recently but unsure if this is new.  No other symptoms noted.  UA did not show any evidence of UTI.  CXR performed and lower lobe opacity appears unchanged.  Paracentesis done with 327 WBC but only 9% Neutrophils.   - Follow cultures.  1/2 cultures from 7/7 growing Enterococcus faecalis and   - Repeat cultures   - Ceftriaxone stopped  - Continue Ampicillin   - ID consulted and appreciate their recommendations. They are managing the patient's  antibiotics      CKD stage IV w/NGUYEN vs worsening renal function   Baseline creatinine 2-2.2, presumably due to hepatorenal syndrome. Creatinine up to 2.71 during this hospitalization. Nephrology was consulted, and he was treated with IV fluids and albumin  - Creatinine had plateaued in 2.5-2.8 range   - Continues on PTA midodrine  - Nephrology following and appreciate their recommendations      Hypervolemic hyponatremia. Resolved   Na fabiola 127, resolved with fluid restriction  - On 1500 mL fluid restricution     Pancytopenia due to alcoholic cirrhosis  CBC has been stable with no s/sx of bleeding or infection during this hospitalization  - Follow intermittently      Urinary retention  History of BPH  Coude catheter placed 6/29 for ongoing urinary retention  - Voiding trial today, 7/6  - He was initiated on finasteride during this hospitalization  - Continues on PTA tamsulosin     Acute alcohol withdrawal. Resolved  Alcohol dependence  Acutely intoxicated on arrival. BARNEY 0.34. Developed alcohol withdrawal following admission, and was treated with lorazepam per CIWA. Patient is apparently under active commitment that was recently renewed for another year.  - On thiamine/folate/MVI  - On K/Mg/Phos replacement protocols  - CD recommending TCU with CD services     Major recurrent depressive disorder with anxiety  * PTA: gabapentin 300 mg TID, mirtazapine 30 mg qhs, vortioxetine 10 mg BID, quetiapine 200 mg qhs and 50 mg TID prn, eszopiclone 3 mg qhs prn sleep]  - Continues on PTA meds, no adjustments per Psych during this admission  - Renally dosing Neurontin      COPD  Chronic and stable on PTA inhalers     Nicotine dependence  Nicotine patch in place     JANIS  Chronic and stable on CPAP     Umbilical Hernia  Chronic and stable. Recently evaluated by General Surgery; no intervention recommended     Physical deconditioning in context of acute on chronic medical illness  - PT/OT recommending TCU         Diet: Fluid  restriction 1500 ML FLUID  Snacks/Supplements Pediatric: Ensure Enlive; Between Meals  2 Gram Sodium Diet    DVT Prophylaxis: Pneumatic Compression Devices  Leger Catheter: Not present  Central Lines: None  Code Status: No CPR- Do NOT Intubate      Disposition Plan   Expected discharge: TBD.  Likely 3-4 days once antibiotics determined.  Will need TCU      The patient's care was discussed with the Bedside Nurse, Care Coordinator/ and Patient.    Godfrey Vasques DO  Hospitalist Service  [facility]  Securely message with the Vocera Web Console (learn more here)  Text page via [employer] Paging/Directory      Risk Factors Present on Admission                ______________________________________________________________________    Interval History   Patient seen and examined.  No acute events over night.  No further fevers.  No chest pain or SOB. No abdominal pain.  No vomiting.      Data reviewed today: I reviewed all medications, new labs and imaging results over the last 24 hours. I personally reviewed no images or EKG's today.    Physical Exam   Vital Signs: Temp: 97.4  F (36.3  C) Temp src: Oral BP: 134/59 Pulse: 66   Resp: 16 SpO2: 97 % O2 Device: None (Room air)    Weight: 172 lbs 1.6 oz  General Appearance: Resting comfortably.  NAD   Respiratory: Clear to auscultation.  No respiratory distress  Cardiovascular: RRR. No obvious murmurs  GI: Soft.  Moderately distended  Skin: No obvious rashes or cyanosis  Other: No edema.  No calf tenderness      Data   Recent Labs   Lab 07/09/21  0828 07/08/21  1057 07/07/21  0827 07/06/21  0732 07/04/21  0809   WBC  --  2.9*  --  2.9* 3.5*   HGB  --  7.2*  --  8.4* 8.0*   MCV  --  95  --  93 97   PLT  --  115*  --  134* 129*    133 134 135 136   POTASSIUM 4.2 3.8 4.0 4.0 4.1   CHLORIDE 104 103 103 104 107   CO2 25 23 26 23 21   BUN 32* 33* 32* 34* 32*   CR 2.91* 2.82* 2.84* 2.81* 2.58*   ANIONGAP 6 7 5 8 8   KEV 8.5 8.1* 8.4* 8.5 8.2*    GLC 95 134* 108* 88 176*   ALBUMIN  --   --  3.1* 2.9* 2.9*   PROTTOTAL  --   --   --  6.3* 6.2*   BILITOTAL  --   --   --  0.5 1.0   ALKPHOS  --   --   --  181* 170*   ALT  --   --   --  20 22   AST  --   --   --  21 26     No results found for this or any previous visit (from the past 24 hour(s)).

## 2021-07-09 NOTE — PLAN OF CARE
Pt is A&Ox4, forgetful. VSS on RA. Pain managed by oxycodone. Seroquel given for anxiety. 2g Na+, 1500 FR. Ax2 with GB/W. Blood cultures redrawn this evening. Discharge pending.

## 2021-07-09 NOTE — PLAN OF CARE
DATE & TIME: 7/9/21 @ [id number]  SUMMARY: SOB, Ascites     Cognitive Concerns/ Orientation : Aox4 w/ forgetfulness   BEHAVIOR & AGGRESSION TOOL COLOR: Green  CIWA SCORE: 0   ABNL VS/O2: VSS on RA  MOBILITY: SBA w/ GB and walker  PAIN MANAGMENT: Abd pain controlled w/ PRN oxy x2 this shift and heat  DIET: 2g Na w/ 1500 fluid restriction  BOWEL/BLADDER: Continent. Voiding in urinal. + flatus, + BS, - BM this shift.  ABNL LAB/BG: Urea: 32, Creat: 2.91, GFR: 21. + blood cultures   DRAIN/DEVICES: PIV SL w/ int ABX   TELEMETRY RHYTHM: NA  SKIN: Bruised. Paracentesis site/ LLQ bandage CDI.   TESTS/PROCEDURES: NA  D/C DAY/GOALS/PLACE: Plan to discharge to TCU pending pt progress  OTHER IMPORTANT INFO: Pt under active commitment. PT/OT recommend TCU.

## 2021-07-09 NOTE — PLAN OF CARE
A&Ox4, forgetful at times. VSS on RA, afebrile overnight. C/o 8/10 pain, managed with PRN oxy. Voiding adequately overnight. Abd distended, multiple para sites. Pt in nicotine patch free period, requesting it be placed in am. Discharge to TCU pending, currently on commitment.

## 2021-07-10 LAB
ALBUMIN SERPL-MCNC: 2.8 G/DL (ref 3.4–5)
ANION GAP SERPL CALCULATED.3IONS-SCNC: 6 MMOL/L (ref 3–14)
BACTERIA SPEC CULT: ABNORMAL
BUN SERPL-MCNC: 32 MG/DL (ref 7–30)
CALCIUM SERPL-MCNC: 8.1 MG/DL (ref 8.5–10.1)
CHLORIDE SERPL-SCNC: 105 MMOL/L (ref 94–109)
CO2 SERPL-SCNC: 24 MMOL/L (ref 20–32)
CREAT SERPL-MCNC: 2.83 MG/DL (ref 0.66–1.25)
GFR SERPL CREATININE-BSD FRML MDRD: 22 ML/MIN/{1.73_M2}
GLUCOSE SERPL-MCNC: 81 MG/DL (ref 70–99)
LABORATORY COMMENT REPORT: NORMAL
Lab: ABNORMAL
PHOSPHATE SERPL-MCNC: 4.7 MG/DL (ref 2.5–4.5)
POTASSIUM SERPL-SCNC: 4.3 MMOL/L (ref 3.4–5.3)
SARS-COV-2 RNA RESP QL NAA+PROBE: NEGATIVE
SODIUM SERPL-SCNC: 135 MMOL/L (ref 133–144)
SPECIMEN SOURCE: ABNORMAL
SPECIMEN SOURCE: NORMAL

## 2021-07-10 PROCEDURE — 99232 SBSQ HOSP IP/OBS MODERATE 35: CPT | Performed by: INTERNAL MEDICINE

## 2021-07-10 PROCEDURE — 250N000011 HC RX IP 250 OP 636: Performed by: INTERNAL MEDICINE

## 2021-07-10 PROCEDURE — 250N000013 HC RX MED GY IP 250 OP 250 PS 637: Performed by: INTERNAL MEDICINE

## 2021-07-10 PROCEDURE — 87635 SARS-COV-2 COVID-19 AMP PRB: CPT | Performed by: INTERNAL MEDICINE

## 2021-07-10 PROCEDURE — 250N000013 HC RX MED GY IP 250 OP 250 PS 637: Performed by: HOSPITALIST

## 2021-07-10 PROCEDURE — 36415 COLL VENOUS BLD VENIPUNCTURE: CPT | Performed by: INTERNAL MEDICINE

## 2021-07-10 PROCEDURE — 80069 RENAL FUNCTION PANEL: CPT | Performed by: INTERNAL MEDICINE

## 2021-07-10 PROCEDURE — 250N000011 HC RX IP 250 OP 636: Performed by: SPECIALIST

## 2021-07-10 PROCEDURE — P9047 ALBUMIN (HUMAN), 25%, 50ML: HCPCS | Performed by: INTERNAL MEDICINE

## 2021-07-10 PROCEDURE — 120N000001 HC R&B MED SURG/OB

## 2021-07-10 RX ORDER — OXYCODONE HYDROCHLORIDE 5 MG/1
5 TABLET ORAL
Status: DISCONTINUED | OUTPATIENT
Start: 2021-07-10 | End: 2021-07-13

## 2021-07-10 RX ORDER — HYDROXYZINE HYDROCHLORIDE 25 MG/1
25 TABLET, FILM COATED ORAL EVERY 4 HOURS PRN
Status: DISCONTINUED | OUTPATIENT
Start: 2021-07-10 | End: 2021-07-14 | Stop reason: HOSPADM

## 2021-07-10 RX ORDER — HYDROXYZINE HYDROCHLORIDE 25 MG/1
50 TABLET, FILM COATED ORAL EVERY 4 HOURS PRN
Status: DISCONTINUED | OUTPATIENT
Start: 2021-07-10 | End: 2021-07-14 | Stop reason: HOSPADM

## 2021-07-10 RX ADMIN — MIRTAZAPINE 30 MG: 15 TABLET, FILM COATED ORAL at 21:28

## 2021-07-10 RX ADMIN — OXYCODONE HYDROCHLORIDE 5 MG: 5 TABLET ORAL at 20:50

## 2021-07-10 RX ADMIN — AMPICILLIN SODIUM 2 G: 2 INJECTION, POWDER, FOR SOLUTION INTRAVENOUS at 11:15

## 2021-07-10 RX ADMIN — NICOTINE 1 PATCH: 21 PATCH, EXTENDED RELEASE TRANSDERMAL at 21:28

## 2021-07-10 RX ADMIN — MIDODRINE HYDROCHLORIDE 10 MG: 5 TABLET ORAL at 17:45

## 2021-07-10 RX ADMIN — TRAZODONE HYDROCHLORIDE 100 MG: 100 TABLET ORAL at 21:28

## 2021-07-10 RX ADMIN — OXYCODONE HYDROCHLORIDE 10 MG: 5 TABLET ORAL at 04:57

## 2021-07-10 RX ADMIN — MIDODRINE HYDROCHLORIDE 10 MG: 5 TABLET ORAL at 08:08

## 2021-07-10 RX ADMIN — FOLIC ACID 1 MG: 1 TABLET ORAL at 08:08

## 2021-07-10 RX ADMIN — LACTULOSE 20 G: 10 SOLUTION ORAL at 20:29

## 2021-07-10 RX ADMIN — ALBUMIN HUMAN 12.5 G: 0.25 SOLUTION INTRAVENOUS at 02:50

## 2021-07-10 RX ADMIN — HYDROXYZINE HYDROCHLORIDE 50 MG: 25 TABLET ORAL at 20:57

## 2021-07-10 RX ADMIN — ALBUMIN HUMAN 12.5 G: 0.25 SOLUTION INTRAVENOUS at 20:50

## 2021-07-10 RX ADMIN — GABAPENTIN 200 MG: 100 CAPSULE ORAL at 21:28

## 2021-07-10 RX ADMIN — LACTULOSE 20 G: 10 SOLUTION ORAL at 08:08

## 2021-07-10 RX ADMIN — VORTIOXETINE 10 MG: 10 TABLET, FILM COATED ORAL at 08:08

## 2021-07-10 RX ADMIN — MAGNESIUM OXIDE TAB 400 MG (241.3 MG ELEMENTAL MG) 400 MG: 400 (241.3 MG) TAB at 08:08

## 2021-07-10 RX ADMIN — FINASTERIDE 5 MG: 5 TABLET, FILM COATED ORAL at 08:08

## 2021-07-10 RX ADMIN — QUETIAPINE FUMARATE 50 MG: 25 TABLET ORAL at 10:32

## 2021-07-10 RX ADMIN — AMPICILLIN SODIUM 2 G: 2 INJECTION, POWDER, FOR SOLUTION INTRAVENOUS at 20:29

## 2021-07-10 RX ADMIN — ALBUMIN HUMAN 12.5 G: 0.25 SOLUTION INTRAVENOUS at 11:36

## 2021-07-10 RX ADMIN — GABAPENTIN 200 MG: 100 CAPSULE ORAL at 08:08

## 2021-07-10 RX ADMIN — TAMSULOSIN HYDROCHLORIDE 0.8 MG: 0.4 CAPSULE ORAL at 08:08

## 2021-07-10 RX ADMIN — OXYCODONE HYDROCHLORIDE 10 MG: 5 TABLET ORAL at 08:15

## 2021-07-10 RX ADMIN — AMPICILLIN SODIUM 2 G: 2 INJECTION, POWDER, FOR SOLUTION INTRAVENOUS at 03:07

## 2021-07-10 RX ADMIN — THIAMINE HCL TAB 100 MG 100 MG: 100 TAB at 08:08

## 2021-07-10 RX ADMIN — OXYCODONE HYDROCHLORIDE 5 MG: 5 TABLET ORAL at 16:12

## 2021-07-10 RX ADMIN — GABAPENTIN 200 MG: 100 CAPSULE ORAL at 16:11

## 2021-07-10 RX ADMIN — MICONAZOLE NITRATE: 20 POWDER TOPICAL at 08:09

## 2021-07-10 RX ADMIN — QUETIAPINE 200 MG: 200 TABLET, FILM COATED ORAL at 21:28

## 2021-07-10 RX ADMIN — MIDODRINE HYDROCHLORIDE 10 MG: 5 TABLET ORAL at 11:15

## 2021-07-10 RX ADMIN — PANTOPRAZOLE SODIUM 40 MG: 40 TABLET, DELAYED RELEASE ORAL at 20:29

## 2021-07-10 RX ADMIN — PANTOPRAZOLE SODIUM 40 MG: 40 TABLET, DELAYED RELEASE ORAL at 08:08

## 2021-07-10 RX ADMIN — HYDROXYZINE HYDROCHLORIDE 25 MG: 25 TABLET ORAL at 16:11

## 2021-07-10 RX ADMIN — MULTIPLE VITAMINS W/ MINERALS TAB 1 TABLET: TAB at 08:08

## 2021-07-10 RX ADMIN — VORTIOXETINE 10 MG: 10 TABLET, FILM COATED ORAL at 20:29

## 2021-07-10 ASSESSMENT — ACTIVITIES OF DAILY LIVING (ADL)
ADLS_ACUITY_SCORE: 13

## 2021-07-10 ASSESSMENT — MIFFLIN-ST. JEOR: SCORE: 1524.7

## 2021-07-10 NOTE — PROGRESS NOTES
INFECTIOUS DISEASES CHART CHECK PROGRESS NOTE    Assessment:  Patient with ESLD admitted 6/25 abdominal pain, with then development of Enterococcal bacteremia (amp sens), presume abdominal origin. Repeat BC ngtd on ampicillin; also had Staph pischifermentans in blood culture which is CONS, suspect contaminant. Repeat BC ngtd. Plan for ampicillin x 2 weeks.     Recommendations:  -Continue ampicillin.   -Ok for midline once blood cultures negative x 72h.    Charo Andre MD  ------------------------------------------------------------------------------------------------------------------------------------------------------------------  Subjective/Interval Events:  -Afeb  -Staph on BC identified as Staph pischifermentans, a CONS  -Repeat BC ngtd  Chart checked    Laboratory Data:  Creatinine   Date Value Ref Range Status   07/10/2021 2.83 (H) 0.66 - 1.25 mg/dL Final   07/09/2021 2.91 (H) 0.66 - 1.25 mg/dL Final   07/08/2021 2.82 (H) 0.66 - 1.25 mg/dL Final   07/07/2021 2.84 (H) 0.66 - 1.25 mg/dL Final   07/06/2021 2.81 (H) 0.66 - 1.25 mg/dL Final     WBC   Date Value Ref Range Status   07/08/2021 2.9 (L) 4.0 - 11.0 10e9/L Final   07/06/2021 2.9 (L) 4.0 - 11.0 10e9/L Final   07/04/2021 3.5 (L) 4.0 - 11.0 10e9/L Final   07/02/2021 3.5 (L) 4.0 - 11.0 10e9/L Final   07/01/2021 3.9 (L) 4.0 - 11.0 10e9/L Final     Hemoglobin   Date Value Ref Range Status   07/08/2021 7.2 (L) 13.3 - 17.7 g/dL Final     Platelet Count   Date Value Ref Range Status   07/08/2021 115 (L) 150 - 450 10e9/L Final     Lab Results   Component Value Date     07/10/2021    BUN 32 (H) 07/10/2021    CO2 24 07/10/2021     No results found for: CRP     Micro:  Recent Labs   Lab 07/09/21  1710 07/09/21  1522 07/07/21  1442 07/07/21  0257 07/07/21  0249   CULT No growth after 8 hours No growth after 8 hours Culture negative monitoring continues  Culture negative monitoring continues Cultured on the 1st day of incubation:  Enterococcus faecalis  *   Critical Value/Significant Value, preliminary result only, called to and read back by  Veronica Gomez RN @ 2152. 7/7/21. AV    Cultured on the 1st day of incubation:  Staphylococcus piscifermentans  Identification obtained by MALDI-TOF mass spectrometry research use only database. Test   characteristics determined and verified by the [facility]   ([facility]) [place], [place].  *  Critical Value/Significant Value, preliminary result only, called to and read back by  Mariya Pierre RN @ 1053.cg 07/09/21    (Note)  POSITIVE for ENTEROCOCCUS FAECALIS and NEGATIVE for Michael/vanB genes  by Terapioigene multiplex nucleic acid test. Final identification and  antimicrobial susceptibility testing will be verified by standard  methods.    Specimen tested with Verigene multiplex, gram-positive blood culture  nucleic acid test for the following targets: Staph aureus, Staph  epidermidis, Staph lugdunensis, other Staph species, Enterococcus  faecalis, Enterococcus faecium, Streptococcus species, S. agalactiae,  S. anginosus grp., S. pneumoniae, S. pyogenes, Listeria sp., mecA  (methicillin resistance) and Michael/B (vancomycin resistance).    Critical Value/Significant Value called to and read back by Charity Ladd RN  07.08.21 CF   No growth after 3 days   SDES Blood Blood Ascites  Ascites  Ascites Blood Left Hand Blood Right Hand       Imaging:  No results found for this or any previous visit (from the past 48 hour(s)).

## 2021-07-10 NOTE — PLAN OF CARE
Pt is A&Ox4, forgetful. VSS on RA. Pain managed by oxycodone. Seroquel given for anxiety. 2g Na+, 1500 FR. Ax2 with GB/W. Appetite good. No further needs noted.

## 2021-07-10 NOTE — PLAN OF CARE
A&Ox4, forgetful at times. VSS on RA. C/o abd pain, managed with PRN oxy. Using urinal at bedside, voiding adequately. Abd distended. Large BM x1. Nicotine patch to R arm. PIV SL w/ int abx. Discharge to TCU pending, currently on commitment.

## 2021-07-10 NOTE — PROGRESS NOTES
[facility]    Medicine Progress Note - Hospitalist Service       Date of Admission:  6/25/2021    Assessment & Plan            66 year old male with hx of alcoholic cirrhosis with ascites and ongoing alcohol use who was admitted on 6/25/2021 for recurrent ascites. Hospital course complicated by alcohol withdrawal which has now resolved. He is under an active commitment      Alcoholic cirrhosis with recurrent ascities  * Patient missed his weekly paracentesis and presented with markedly distended abdomen with associated pain and respiratory distress  * Underwent large-volume paracentesis (>5L removed) on 6/26, 6/29, and 7/2. No evidence of SBP. He is not on diuretics due to propensity for NGUYEN  * Tunneled peritoneal catheter was considered, but upon further discussion with IR was not felt to be a good candidate. Patient is currently under active commitment, but has longstanding history of non-compliance. Because it is unclear how long patient will be under commitment, decision was made to defer PleurX catheter placement  - Continues on therapeutic paracentesis prn. Last paracentesis 7/2. Reassess daily  - On 1500 mL fluid restriction  - He has been initiated on lactulose 20g BID for goal 2-3 BM daily  - Continues on PTA midodrine  - May need to consider another paracentesis prior to discharge or if symptoms worsen    Fever  Bacteremia   7/7:  Over night patient spiked a fever of 101.2 degrees.  He does note a cough recently but unsure if this is new.  No other symptoms noted.  UA did not show any evidence of UTI.  CXR performed and lower lobe opacity appears unchanged.  Paracentesis done with 327 WBC but only 9% Neutrophils.   - 1/2 cultures from 7/7 growing Enterococcus faecalis and   - ID consulted, advised IV Ampicillin for 2 weeks     CKD stage IV w/NGUYEN vs worsening renal function   Baseline creatinine 2-2.2, presumably due to hepatorenal syndrome. Creatinine up to 2.71 during this  hospitalization. Nephrology was consulted, and he was treated with IV fluids and albumin  - Creatinine had plateaued in 2.5-2.8 range   - Continues on PTA midodrine  - Nephrology following and appreciate their recommendations      Hypervolemic hyponatremia. Resolved   Na fabiola 127, resolved with fluid restriction  - On 1500 mL fluid restricution     Pancytopenia due to alcoholic cirrhosis  CBC has been stable with no s/sx of bleeding or infection during this hospitalization  - Follow intermittently      Urinary retention  History of BPH  Coude catheter placed 6/29 for ongoing urinary retention  - Voiding trial today, 7/6  - He was initiated on finasteride during this hospitalization  - Continues on PTA tamsulosin     Acute alcohol withdrawal. Resolved  Alcohol dependence  Acutely intoxicated on arrival. BARNEY 0.34. Developed alcohol withdrawal following admission, and was treated with lorazepam per SHAUN. Patient is apparently under active commitment that was recently renewed for another year.  - CD recommending TCU with CD services     Major recurrent depressive disorder with anxiety  * PTA: gabapentin 300 mg TID, mirtazapine 30 mg qhs, vortioxetine 10 mg BID, quetiapine 200 mg qhs and 50 mg TID prn, eszopiclone 3 mg qhs prn sleep]  - Continues on PTA meds, no adjustments per Psych during this admission  - Renally dosing Neurontin      COPD  Chronic and stable on PTA inhalers     Nicotine dependence  Nicotine patch in place, reduce to 14 mg tomorrow     JANIS  Chronic and stable on CPAP     Umbilical Hernia  Chronic and stable. Recently evaluated by General Surgery; no intervention recommended     Physical deconditioning in context of acute on chronic medical illness  - PT/OT recommending TCU         Diet: Fluid restriction 1500 ML FLUID  Snacks/Supplements Pediatric: Ensure Enlive; Between Meals  2 Gram Sodium Diet    DVT Prophylaxis: Pneumatic Compression Devices  Leger Catheter: Not present  Central Lines: None  Code  "Status: No CPR- Do NOT Intubate      Disposition Plan   Expected discharge: TCU in 3 days, will need PICC on discharge (when cultures negative)      The patient's care was discussed with the Bedside Nurse, Care Coordinator/ and Patient.    Gray Vincent MD  Hospitalist Service  [facility]  Securely message with the Vocera Web Console (learn more here)  Text page via [employer] Paging/Directory               ______________________________________________________________________    Interval History   Mild abdominal pain -- continues to use Oxycodone 10 mg several times a day because he feels \"bloated\".  Bowels loose and has BM twice a day.     Physical Exam   Vital Signs: Temp: 97.8  F (36.6  C) Temp src: Oral BP: (!) 140/66 Pulse: 63   Resp: 16 SpO2: 94 % O2 Device: None (Room air)    Weight: 174 lbs 6.4 oz  General Appearance: Resting comfortably.  NAD   Respiratory: Clear to auscultation.  No respiratory distress  Cardiovascular: RRR. No obvious murmurs  GI: Soft.  Moderately distended, minimal tenderness  Skin: No obvious rashes or cyanosis  Extrem: trace edema.  No calf tenderness    Neuro: alert, Ox3    Data   Recent Labs   Lab 07/09/21  0828 07/08/21  1057 07/07/21  0827 07/06/21  0732 07/04/21  0809   WBC  --  2.9*  --  2.9* 3.5*   HGB  --  7.2*  --  8.4* 8.0*   MCV  --  95  --  93 97   PLT  --  115*  --  134* 129*    133 134 135 136   POTASSIUM 4.2 3.8 4.0 4.0 4.1   CHLORIDE 104 103 103 104 107   CO2 25 23 26 23 21   BUN 32* 33* 32* 34* 32*   CR 2.91* 2.82* 2.84* 2.81* 2.58*   ANIONGAP 6 7 5 8 8   KEV 8.5 8.1* 8.4* 8.5 8.2*   GLC 95 134* 108* 88 176*   ALBUMIN  --   --  3.1* 2.9* 2.9*   PROTTOTAL  --   --   --  6.3* 6.2*   BILITOTAL  --   --   --  0.5 1.0   ALKPHOS  --   --   --  181* 170*   ALT  --   --   --  20 22   AST  --   --   --  21 26     No results found for this or any previous visit (from the past 24 hour(s)).  "

## 2021-07-11 LAB
ANION GAP SERPL CALCULATED.3IONS-SCNC: 7 MMOL/L (ref 3–14)
BUN SERPL-MCNC: 32 MG/DL (ref 7–30)
CALCIUM SERPL-MCNC: 8.1 MG/DL (ref 8.5–10.1)
CHLORIDE SERPL-SCNC: 102 MMOL/L (ref 94–109)
CO2 SERPL-SCNC: 23 MMOL/L (ref 20–32)
CREAT SERPL-MCNC: 2.74 MG/DL (ref 0.66–1.25)
GFR SERPL CREATININE-BSD FRML MDRD: 23 ML/MIN/{1.73_M2}
GLUCOSE SERPL-MCNC: 79 MG/DL (ref 70–99)
POTASSIUM SERPL-SCNC: 4.3 MMOL/L (ref 3.4–5.3)
SODIUM SERPL-SCNC: 132 MMOL/L (ref 133–144)

## 2021-07-11 PROCEDURE — 250N000011 HC RX IP 250 OP 636: Performed by: SPECIALIST

## 2021-07-11 PROCEDURE — 99232 SBSQ HOSP IP/OBS MODERATE 35: CPT | Performed by: INTERNAL MEDICINE

## 2021-07-11 PROCEDURE — 36415 COLL VENOUS BLD VENIPUNCTURE: CPT | Performed by: INTERNAL MEDICINE

## 2021-07-11 PROCEDURE — 120N000001 HC R&B MED SURG/OB

## 2021-07-11 PROCEDURE — P9047 ALBUMIN (HUMAN), 25%, 50ML: HCPCS | Performed by: INTERNAL MEDICINE

## 2021-07-11 PROCEDURE — 250N000013 HC RX MED GY IP 250 OP 250 PS 637: Performed by: INTERNAL MEDICINE

## 2021-07-11 PROCEDURE — 80048 BASIC METABOLIC PNL TOTAL CA: CPT | Performed by: INTERNAL MEDICINE

## 2021-07-11 PROCEDURE — 250N000013 HC RX MED GY IP 250 OP 250 PS 637: Performed by: HOSPITALIST

## 2021-07-11 PROCEDURE — 250N000011 HC RX IP 250 OP 636: Performed by: INTERNAL MEDICINE

## 2021-07-11 RX ADMIN — TAMSULOSIN HYDROCHLORIDE 0.8 MG: 0.4 CAPSULE ORAL at 08:51

## 2021-07-11 RX ADMIN — ALBUMIN HUMAN 12.5 G: 0.25 SOLUTION INTRAVENOUS at 06:37

## 2021-07-11 RX ADMIN — FOLIC ACID 1 MG: 1 TABLET ORAL at 08:51

## 2021-07-11 RX ADMIN — OXYCODONE HYDROCHLORIDE 5 MG: 5 TABLET ORAL at 20:10

## 2021-07-11 RX ADMIN — GABAPENTIN 200 MG: 100 CAPSULE ORAL at 08:51

## 2021-07-11 RX ADMIN — VORTIOXETINE 10 MG: 10 TABLET, FILM COATED ORAL at 08:51

## 2021-07-11 RX ADMIN — OXYCODONE HYDROCHLORIDE 5 MG: 5 TABLET ORAL at 12:46

## 2021-07-11 RX ADMIN — MICONAZOLE NITRATE: 20 POWDER TOPICAL at 08:54

## 2021-07-11 RX ADMIN — FINASTERIDE 5 MG: 5 TABLET, FILM COATED ORAL at 08:50

## 2021-07-11 RX ADMIN — AMPICILLIN SODIUM 2 G: 2 INJECTION, POWDER, FOR SOLUTION INTRAVENOUS at 06:37

## 2021-07-11 RX ADMIN — LACTULOSE 20 G: 10 SOLUTION ORAL at 21:04

## 2021-07-11 RX ADMIN — MAGNESIUM OXIDE TAB 400 MG (241.3 MG ELEMENTAL MG) 400 MG: 400 (241.3 MG) TAB at 08:51

## 2021-07-11 RX ADMIN — NICOTINE 1 PATCH: 21 PATCH, EXTENDED RELEASE TRANSDERMAL at 22:14

## 2021-07-11 RX ADMIN — AMPICILLIN SODIUM 2 G: 2 INJECTION, POWDER, FOR SOLUTION INTRAVENOUS at 14:19

## 2021-07-11 RX ADMIN — MIDODRINE HYDROCHLORIDE 10 MG: 5 TABLET ORAL at 18:02

## 2021-07-11 RX ADMIN — TRAZODONE HYDROCHLORIDE 100 MG: 100 TABLET ORAL at 21:04

## 2021-07-11 RX ADMIN — MICONAZOLE NITRATE: 20 POWDER TOPICAL at 21:35

## 2021-07-11 RX ADMIN — MIRTAZAPINE 30 MG: 15 TABLET, FILM COATED ORAL at 21:04

## 2021-07-11 RX ADMIN — MULTIPLE VITAMINS W/ MINERALS TAB 1 TABLET: TAB at 08:51

## 2021-07-11 RX ADMIN — QUETIAPINE 200 MG: 200 TABLET, FILM COATED ORAL at 21:04

## 2021-07-11 RX ADMIN — MIDODRINE HYDROCHLORIDE 10 MG: 5 TABLET ORAL at 08:51

## 2021-07-11 RX ADMIN — PANTOPRAZOLE SODIUM 40 MG: 40 TABLET, DELAYED RELEASE ORAL at 21:04

## 2021-07-11 RX ADMIN — PANTOPRAZOLE SODIUM 40 MG: 40 TABLET, DELAYED RELEASE ORAL at 08:51

## 2021-07-11 RX ADMIN — AMPICILLIN SODIUM 2 G: 2 INJECTION, POWDER, FOR SOLUTION INTRAVENOUS at 21:05

## 2021-07-11 RX ADMIN — MIDODRINE HYDROCHLORIDE 10 MG: 5 TABLET ORAL at 12:47

## 2021-07-11 RX ADMIN — LACTULOSE 20 G: 10 SOLUTION ORAL at 08:53

## 2021-07-11 RX ADMIN — GABAPENTIN 200 MG: 100 CAPSULE ORAL at 15:55

## 2021-07-11 RX ADMIN — HYDROXYZINE HYDROCHLORIDE 25 MG: 25 TABLET ORAL at 21:34

## 2021-07-11 RX ADMIN — OXYCODONE HYDROCHLORIDE 5 MG: 5 TABLET ORAL at 15:55

## 2021-07-11 RX ADMIN — VORTIOXETINE 10 MG: 10 TABLET, FILM COATED ORAL at 21:04

## 2021-07-11 RX ADMIN — QUETIAPINE FUMARATE 50 MG: 25 TABLET ORAL at 18:02

## 2021-07-11 RX ADMIN — HYDROXYZINE HYDROCHLORIDE 50 MG: 25 TABLET ORAL at 15:54

## 2021-07-11 RX ADMIN — GABAPENTIN 200 MG: 100 CAPSULE ORAL at 21:04

## 2021-07-11 ASSESSMENT — ACTIVITIES OF DAILY LIVING (ADL)
ADLS_ACUITY_SCORE: 13

## 2021-07-11 ASSESSMENT — MIFFLIN-ST. JEOR: SCORE: 1550.1

## 2021-07-11 NOTE — PROGRESS NOTES
[facility]    Nephrology Progress Note     Assessment & Plan       1) Cirrhosis with ascites and portal HTN: Alcoholic cirrhosis requiring frequent paracentesis     2) CKD 4:  GFR has been 25-30 over the last month or so.  UA bland, urine sodium less than 10.  This is likely hepatorenal syndrome type 2. May also have been affected by sepsis/bacteremia.       3) Ascites: He has needed weekly paracenteses.       4) Pancytopenia:  Due to portal HTN and hypersplenism.    5 Hyponatremia - mild, hypervolemic with ESLD     Plan:    - hold further doses of albumin. Continue Midodrine  - If paracentesis dose, try to keep vol < 4-5 L . Replace albumin with paracentesis.   - Suggest recheck CBC. Hgb was trending down on last check.          Alpesh Cifuentes MD  [facility] - Nephrology   [phone]      Interval History     No acute issues overnight.  Feels better than yesterday . Abdomen is getting distended and getting slightly uncomfortable.  Got IV albumin 12.5 gm X 6 doses.   rine output of 1000 cc.  Creatinine slightly better.    ROS-no fever, no nausea vomiting, no chest pain.  No headache or visual changes.      Physical Exam   Temp: 96.6  F (35.9  C) Temp src: Oral BP: (!) 140/62 Pulse: 68   Resp: 17 SpO2: 94 % O2 Device: None (Room air)    Vitals:    07/09/21 0500 07/10/21 0708 07/11/21 0700   Weight: 78.1 kg (172 lb 1.6 oz) 79.1 kg (174 lb 6.4 oz) 81.6 kg (180 lb)     Vital Signs with Ranges  Temp:  [96.6  F (35.9  C)-98.1  F (36.7  C)] 96.6  F (35.9  C)  Pulse:  [57-68] 68  Resp:  [16-17] 17  BP: (116-140)/(53-63) 140/62  SpO2:  [94 %-97 %] 94 %  I/O last 3 completed shifts:  In: -   Out: 1025 [Urine:1025]    GENERAL APPEARANCE: pleasant, NAD, a & o  HEENT:  Eyes/ears/nose/neck grossly normal  RESP: lungs cta bilateral  CV: RRR, nl S1/S2, no m/r/g   ABDOMEN: distended, soft, nontender  EXTREMITIES/SKIN: no rashes/lesions;  Tr edema    Medications       ampicillin  2 g Intravenous  Q8H     finasteride  5 mg Oral Daily     folic acid  1 mg Oral Daily     gabapentin  200 mg Oral TID     lactulose  20 g Oral BID     magnesium oxide  400 mg Oral Daily     miconazole   Topical BID     midodrine  10 mg Oral TID w/meals     mirtazapine  30 mg Oral At Bedtime     multivitamin w/minerals  1 tablet Oral Daily     nicotine  1 patch Transdermal Q24H     nicotine   Transdermal Q8H     pantoprazole  40 mg Oral BID     QUEtiapine  200 mg Oral At Bedtime     sodium chloride (PF)  3 mL Intracatheter Q8H     tamsulosin  0.8 mg Oral Daily     traZODone  100 mg Oral At Bedtime     vortioxetine  10 mg Oral BID       Data   BMP  Recent Labs   Lab 07/11/21  0628 07/10/21  0714 07/09/21  0828 07/08/21  1057   * 135 135 133   POTASSIUM 4.3 4.3 4.2 3.8   CHLORIDE 102 105 104 103   KEV 8.1* 8.1* 8.5 8.1*   CO2 23 24 25 23   BUN 32* 32* 32* 33*   CR 2.74* 2.83* 2.91* 2.82*   GLC 79 81 95 134*     Phos@[email or web address](phos:4)  CBC)  Recent Labs   Lab 07/08/21  1057 07/06/21  0732   WBC 2.9* 2.9*   HGB 7.2* 8.4*   HCT 22.2* 26.3*   MCV 95 93   * 134*     Recent Labs   Lab 07/06/21  0732   AST 21   ALT 20   ALKPHOS 181*   BILITOTAL 0.5       Attestation:   I have reviewed today's relevant vital signs, notes, medications, labs and imaging.

## 2021-07-11 NOTE — PLAN OF CARE
Pt AO4, forgetful.  VSS on RA, used CPAP overnight per request.  Pain managed by Oxy and atarax.  A1 GB W.  Voiding via urinal.  DNR/DNI.  1500 mL fluid restrictions.  Plan for TCU on discharge.

## 2021-07-11 NOTE — PROGRESS NOTES
Care Management Follow Up    Length of Stay (days): 15    Expected Discharge Date:       Concerns to be Addressed: discharge planning     Patient plan of care discussed at interdisciplinary rounds: Yes    Anticipated Discharge Disposition:       Anticipated Discharge Services:    Anticipated Discharge DME:      Patient/family educated on Medicare website which has current facility and service quality ratings:    Education Provided on the Discharge Plan:    Patient/Family in Agreement with the Plan:      Referrals Placed by CM/SW:    Private pay costs discussed: Not applicable    Additional Information:  Writer left vm's with [facility] Liaison and [facility] liaison.     MIRTA Escobar

## 2021-07-11 NOTE — PROGRESS NOTES
Patient is A/O x 4, vss, a-febrile, c/o  Abdominal pain, oxycodone and atarax helping, admitted with Alcoholic cirrhosis requiring paracentesis, was tapped on Wednesday, will be  tapped possibly tomorrow, belly distended and tender, voiding per urinal , nephrology following, up to the bathroom with SBA, continue to monitor.

## 2021-07-12 LAB
ANION GAP SERPL CALCULATED.3IONS-SCNC: 8 MMOL/L (ref 3–14)
BACTERIA SPEC CULT: NO GROWTH
BUN SERPL-MCNC: 33 MG/DL (ref 7–30)
CALCIUM SERPL-MCNC: 8.6 MG/DL (ref 8.5–10.1)
CHLORIDE BLD-SCNC: 102 MMOL/L (ref 94–109)
CO2 SERPL-SCNC: 23 MMOL/L (ref 20–32)
CREAT SERPL-MCNC: 2.71 MG/DL (ref 0.66–1.25)
ERYTHROCYTE [DISTWIDTH] IN BLOOD BY AUTOMATED COUNT: 17.3 % (ref 10–15)
GFR SERPL CREATININE-BSD FRML MDRD: 23 ML/MIN/1.73M2
GLUCOSE BLD-MCNC: 84 MG/DL (ref 70–99)
HCT VFR BLD AUTO: 25.7 % (ref 40–53)
HGB BLD-MCNC: 8.2 G/DL (ref 13.3–17.7)
MCH RBC QN AUTO: 29.8 PG (ref 26.5–33)
MCHC RBC AUTO-ENTMCNC: 31.9 G/DL (ref 31.5–36.5)
MCV RBC AUTO: 94 FL (ref 78–100)
PLATELET # BLD AUTO: 145 10E3/UL (ref 150–450)
POTASSIUM BLD-SCNC: 4.1 MMOL/L (ref 3.4–5.3)
RBC # BLD AUTO: 2.75 10E6/UL (ref 4.4–5.9)
SODIUM SERPL-SCNC: 133 MMOL/L (ref 133–144)
SPECIMEN SOURCE: NORMAL
WBC # BLD AUTO: 3.9 10E3/UL (ref 4–11)

## 2021-07-12 PROCEDURE — 999N000157 HC STATISTIC RCP TIME EA 10 MIN

## 2021-07-12 PROCEDURE — 120N000001 HC R&B MED SURG/OB

## 2021-07-12 PROCEDURE — 250N000013 HC RX MED GY IP 250 OP 250 PS 637: Performed by: INTERNAL MEDICINE

## 2021-07-12 PROCEDURE — 250N000011 HC RX IP 250 OP 636: Performed by: SPECIALIST

## 2021-07-12 PROCEDURE — 99232 SBSQ HOSP IP/OBS MODERATE 35: CPT | Performed by: INTERNAL MEDICINE

## 2021-07-12 PROCEDURE — 94660 CPAP INITIATION&MGMT: CPT

## 2021-07-12 PROCEDURE — 36415 COLL VENOUS BLD VENIPUNCTURE: CPT | Performed by: INTERNAL MEDICINE

## 2021-07-12 PROCEDURE — 250N000013 HC RX MED GY IP 250 OP 250 PS 637: Performed by: HOSPITALIST

## 2021-07-12 PROCEDURE — 85027 COMPLETE CBC AUTOMATED: CPT | Performed by: INTERNAL MEDICINE

## 2021-07-12 PROCEDURE — 80048 BASIC METABOLIC PNL TOTAL CA: CPT | Performed by: INTERNAL MEDICINE

## 2021-07-12 RX ORDER — SPIRONOLACTONE 25 MG/1
25 TABLET ORAL DAILY
Status: DISCONTINUED | OUTPATIENT
Start: 2021-07-12 | End: 2021-07-13

## 2021-07-12 RX ORDER — LIDOCAINE 40 MG/G
CREAM TOPICAL
Status: DISCONTINUED | OUTPATIENT
Start: 2021-07-12 | End: 2021-07-14 | Stop reason: HOSPADM

## 2021-07-12 RX ORDER — NICOTINE 21 MG/24HR
1 PATCH, TRANSDERMAL 24 HOURS TRANSDERMAL DAILY
Status: DISCONTINUED | OUTPATIENT
Start: 2021-07-12 | End: 2021-07-14 | Stop reason: HOSPADM

## 2021-07-12 RX ADMIN — AMPICILLIN SODIUM 2 G: 2 INJECTION, POWDER, FOR SOLUTION INTRAVENOUS at 05:17

## 2021-07-12 RX ADMIN — MIRTAZAPINE 30 MG: 15 TABLET, FILM COATED ORAL at 21:13

## 2021-07-12 RX ADMIN — HYDROXYZINE HYDROCHLORIDE 50 MG: 25 TABLET ORAL at 23:31

## 2021-07-12 RX ADMIN — PANTOPRAZOLE SODIUM 40 MG: 40 TABLET, DELAYED RELEASE ORAL at 21:13

## 2021-07-12 RX ADMIN — OXYCODONE HYDROCHLORIDE 5 MG: 5 TABLET ORAL at 08:43

## 2021-07-12 RX ADMIN — MIDODRINE HYDROCHLORIDE 10 MG: 5 TABLET ORAL at 12:31

## 2021-07-12 RX ADMIN — GABAPENTIN 200 MG: 100 CAPSULE ORAL at 21:12

## 2021-07-12 RX ADMIN — PANTOPRAZOLE SODIUM 40 MG: 40 TABLET, DELAYED RELEASE ORAL at 08:43

## 2021-07-12 RX ADMIN — QUETIAPINE FUMARATE 50 MG: 25 TABLET ORAL at 05:59

## 2021-07-12 RX ADMIN — SPIRONOLACTONE 25 MG: 25 TABLET, FILM COATED ORAL at 15:01

## 2021-07-12 RX ADMIN — MULTIPLE VITAMINS W/ MINERALS TAB 1 TABLET: TAB at 08:00

## 2021-07-12 RX ADMIN — FOLIC ACID 1 MG: 1 TABLET ORAL at 08:00

## 2021-07-12 RX ADMIN — OXYCODONE HYDROCHLORIDE 5 MG: 5 TABLET ORAL at 21:20

## 2021-07-12 RX ADMIN — MIDODRINE HYDROCHLORIDE 10 MG: 5 TABLET ORAL at 08:00

## 2021-07-12 RX ADMIN — AMPICILLIN SODIUM 2 G: 2 INJECTION, POWDER, FOR SOLUTION INTRAVENOUS at 13:54

## 2021-07-12 RX ADMIN — QUETIAPINE 200 MG: 200 TABLET, FILM COATED ORAL at 21:13

## 2021-07-12 RX ADMIN — LACTULOSE 20 G: 10 SOLUTION ORAL at 08:02

## 2021-07-12 RX ADMIN — VORTIOXETINE 10 MG: 10 TABLET, FILM COATED ORAL at 08:00

## 2021-07-12 RX ADMIN — FINASTERIDE 5 MG: 5 TABLET, FILM COATED ORAL at 08:01

## 2021-07-12 RX ADMIN — GABAPENTIN 200 MG: 100 CAPSULE ORAL at 15:01

## 2021-07-12 RX ADMIN — TAMSULOSIN HYDROCHLORIDE 0.8 MG: 0.4 CAPSULE ORAL at 08:00

## 2021-07-12 RX ADMIN — OXYCODONE HYDROCHLORIDE 5 MG: 5 TABLET ORAL at 15:02

## 2021-07-12 RX ADMIN — AMPICILLIN SODIUM 2 G: 2 INJECTION, POWDER, FOR SOLUTION INTRAVENOUS at 21:16

## 2021-07-12 RX ADMIN — NICOTINE 1 PATCH: 14 PATCH, EXTENDED RELEASE TRANSDERMAL at 21:14

## 2021-07-12 RX ADMIN — TRAZODONE HYDROCHLORIDE 100 MG: 100 TABLET ORAL at 21:13

## 2021-07-12 RX ADMIN — GABAPENTIN 200 MG: 100 CAPSULE ORAL at 08:00

## 2021-07-12 RX ADMIN — VORTIOXETINE 10 MG: 10 TABLET, FILM COATED ORAL at 21:12

## 2021-07-12 RX ADMIN — MIDODRINE HYDROCHLORIDE 10 MG: 5 TABLET ORAL at 17:36

## 2021-07-12 RX ADMIN — MAGNESIUM OXIDE TAB 400 MG (241.3 MG ELEMENTAL MG) 400 MG: 400 (241.3 MG) TAB at 08:00

## 2021-07-12 RX ADMIN — HYDROXYZINE HYDROCHLORIDE 50 MG: 25 TABLET ORAL at 17:36

## 2021-07-12 RX ADMIN — QUETIAPINE FUMARATE 50 MG: 25 TABLET ORAL at 12:31

## 2021-07-12 ASSESSMENT — ACTIVITIES OF DAILY LIVING (ADL)
ADLS_ACUITY_SCORE: 13
ADLS_ACUITY_SCORE: 13
ADLS_ACUITY_SCORE: 11
ADLS_ACUITY_SCORE: 13

## 2021-07-12 ASSESSMENT — MIFFLIN-ST. JEOR: SCORE: 1553.28

## 2021-07-12 NOTE — PLAN OF CARE
A/O x4. VSS, PRN oxy x2 and PRN atarax x1, effective for abd pain. Denies N/V 2g NA diet, 1500 ml/fluid restriction. PIV saline locked, int abx. Continent, urinal at bedside, BM x2. Up Ax1 GB/W. Plan for paracentesis likely today.

## 2021-07-12 NOTE — PROGRESS NOTES
[facility]    Infectious Disease Progress Note    Date of Service : 07/12/2021     Assessment :  1. Enterococcal sepsis, likely of abdominal origin. Bacteremia is low grade with 1/2 blood cxs + on 7/7, prior negative blood cxs on 6/13. Also staph sp in blood cx pending identification. Do not suspect endocarditis with low grade and new onset bacteremia  2. ESLD with decompensated cirrhosis, portal hypertension and ascites. S/p serial paracentesis. Peritoneal fluid analysis not suggestive of SBP  3. Chronic alcoholism  4. Cytopenias due to end stage liver disease  5. CKD  6. Chronic medical conditions -COPD, CAD, depression, HTN, hyperlipidemia, PVD, JANIS    Recommendations  1. Ampicillin 2 grams Q8H renal adjusted dose X 2 weeks until 7/21/2021 (IV antibiotics ordered in [facility])  2. Staph sp- likely contaminant  3. Repeat blood cxs negative so far      Emilio Wood MD    Interval History   Feels ok, remained afebrile. Continues to complain of abdominal distention and recurrent ascites, hoping can get another paracentesis before he leaves.     Antimicrobial therapy  7/7 Ampicillin    Physical Exam   Temp: 97.9  F (36.6  C) Temp src: Oral BP: 135/67 Pulse: 60   Resp: 16 SpO2: 96 % O2 Device: None (Room air)    Vitals:    07/10/21 0708 07/11/21 0700 07/12/21 0342   Weight: 79.1 kg (174 lb 6.4 oz) 81.6 kg (180 lb) 82 kg (180 lb 11.2 oz)     Vital Signs with Ranges  Temp:  [97.2  F (36.2  C)-98.3  F (36.8  C)] 97.9  F (36.6  C)  Pulse:  [57-60] 60  Resp:  [16] 16  BP: (125-135)/(60-67) 135/67  SpO2:  [95 %-96 %] 96 %    GENERAL APPEARANCE:  Awake, chronically ill appearing male  EYES: Eyes grossly normal to inspection, PERRL and conjunctivae and sclerae normal  NECK: no adenopathy  RESP: lungs clear to auscultation - no rales, rhonchi or wheezes  CV: regular rates and rhythmand no murmur, click or rub  ABDOMEN: Distended, clinical ascites, umbilical hernia.  MS: extremities normal- no gross  deformities noted  SKIN: no suspicious lesions or rashes    Other:    Medications       ampicillin  2 g Intravenous Q8H     finasteride  5 mg Oral Daily     folic acid  1 mg Oral Daily     gabapentin  200 mg Oral TID     lactulose  20 g Oral BID     magnesium oxide  400 mg Oral Daily     miconazole   Topical BID     midodrine  10 mg Oral TID w/meals     mirtazapine  30 mg Oral At Bedtime     multivitamin w/minerals  1 tablet Oral Daily     nicotine  1 patch Transdermal Q24H     nicotine   Transdermal Q8H     pantoprazole  40 mg Oral BID     QUEtiapine  200 mg Oral At Bedtime     sodium chloride (PF)  3 mL Intracatheter Q8H     tamsulosin  0.8 mg Oral Daily     traZODone  100 mg Oral At Bedtime     vortioxetine  10 mg Oral BID       Data   All microbiology laboratory data reviewed.  Recent Labs   Lab Test 07/12/21  0818 07/08/21  1057 07/06/21  0732   WBC 3.9* 2.9* 2.9*   HGB 8.2* 7.2* 8.4*   HCT 25.7* 22.2* 26.3*   MCV 94 95 93   * 115* 134*     Recent Labs   Lab Test 07/12/21  0818 07/11/21  0628 07/10/21  0714   CR 2.71* 2.74* 2.83*     No lab results found.  Recent Labs   Lab Test 07/09/21  1710 07/09/21  1522 07/07/21  1442 07/07/21  0257 07/07/21  0249 06/29/21  1355 06/13/21  0849 06/13/21  0848 06/06/21  1051   CULT No growth after 3 days No growth after 3 days No growth  Culture negative monitoring continues Cultured on the 1st day of incubation:  Enterococcus faecalis  *  Critical Value/Significant Value, preliminary result only, called to and read back by  Veronica Gomez RN @ 2152. 7/7/21. AV    Cultured on the 1st day of incubation:  Staphylococcus piscifermentans  Identification obtained by MALDI-TOF mass spectrometry research use only database. Test   characteristics determined and verified by the [facility]   ([facility]) [place], [place].  *  Critical Value/Significant Value, preliminary result only, called to and read back by  Mariya Pierre RN @ 1053.  07/09/21    (Note)  POSITIVE for ENTEROCOCCUS FAECALIS and NEGATIVE for Michael/vanB genes  by dVisitigene multiplex nucleic acid test. Final identification and  antimicrobial susceptibility testing will be verified by standard  methods.    Specimen tested with Verigene multiplex, gram-positive blood culture  nucleic acid test for the following targets: Staph aureus, Staph  epidermidis, Staph lugdunensis, other Staph species, Enterococcus  faecalis, Enterococcus faecium, Streptococcus species, S. agalactiae,  S. anginosus grp., S. pneumoniae, S. pyogenes, Listeria sp., mecA  (methicillin resistance) and Michael/B (vancomycin resistance).    Critical Value/Significant Value called to and read back by Charity Ladd RN  07.08.21 CF   No growth after 5 days No anaerobes isolated  No growth No growth No growth No growth     7/7 CXR  CHEST TWO VIEWS 7/7/2021 12:21 PM      HISTORY: Fever and cough     COMPARISON: June 25, 2021                                                                       IMPRESSION: Opacity at the left base and elevated left hemidiaphragm  are stable. No definite right-sided infiltrates. Rib deformity stable  on the right. The cardiac silhouette is not enlarged. Pulmonary  vasculature is unremarkable.     6/29 CT abdomen/pelvis  CT ABDOMEN PELVIS W/O CONTRAST 6/29/2021 2:34 PM     CLINICAL HISTORY: LLQ abdominal pain  TECHNIQUE: CT scan of the abdomen and pelvis was performed without IV  contrast. Multiplanar reformats were obtained. Dose reduction  techniques were used.  CONTRAST: None.     COMPARISON: 10/6/2020     FINDINGS:   LOWER CHEST: Mild left basilar dependent atelectasis. Large  paraesophageal varices.     HEPATOBILIARY: Cirrhosis. Evaluation for masses limited on noncontrast  CT. No calcified gallstones or biliary ductal dilatation.     PANCREAS: Normal.     SPLEEN: Splenomegaly.     ADRENAL GLANDS: Normal.     KIDNEYS/BLADDER: No hydronephrosis or significant calculi. Left  upper  pole simple cyst requiring no specific follow-up. The urinary bladder  is decompressed with a Leger catheter.     BOWEL: No obstruction or inflammatory change.     LYMPH NODES: No definite lymphadenopathy on this noncontrast CT.     VASCULATURE: No abdominal aortic aneurysm.     PELVIC ORGANS: No pelvic masses.     OTHER: Aortobiiliac atherosclerotic calcifications. Trace residual  ascites with fluid in the umbilicus. No fluid collections.     MUSCULOSKELETAL: Bilateral rib fractures with callus formation.  Degenerative changes in the spine and instrumented fusion in the  lumbar spine. No suspicious lesions in the bones.                                                                      IMPRESSION:   1.  No acute findings in the abdomen and pelvis on this noncontrast  CT.  2.  Cirrhosis and evidence of portal hypertension.     Attestation:  Total time on the floor involved in the patient's care: 35 minutes. Total time spent in counseling/care coordination: >50%

## 2021-07-12 NOTE — PROGRESS NOTES
[facility]    Medicine Progress Note - Hospitalist Service       Date of Admission:  6/25/2021    Assessment & Plan            66 year old male with hx of alcoholic cirrhosis with ascites and ongoing alcohol use who was admitted on 6/25/2021 for recurrent ascites. Hospital course complicated by alcohol withdrawal which has now resolved. He is under an active commitment      Alcoholic cirrhosis with recurrent ascities  * Patient missed his weekly paracentesis and presented with markedly distended abdomen with associated pain and respiratory distress  * Underwent large-volume paracentesis (>5L removed) on 6/26, 6/29, and 7/2. No evidence of SBP. He is not on diuretics due to propensity for NGUYEN  * Tunneled peritoneal catheter was considered, but upon further discussion with IR was not felt to be a good candidate. Patient is currently under active commitment, but has longstanding history of non-compliance. Because it is unclear how long patient will be under commitment, decision was made to defer PleurX catheter placement  - Continues on therapeutic paracentesis prn. Last paracentesis 7/2. Reassess daily  - On 1500 mL fluid restriction  - He has been initiated on lactulose 20g BID for goal 2-3 BM daily  - Continues on PTA midodrine  - May need to consider another paracentesis prior to discharge or if symptoms worsen    Fever  Bacteremia   7/7:  Over night patient spiked a fever of 101.2 degrees.  He does note a cough recently but unsure if this is new.  No other symptoms noted.  UA did not show any evidence of UTI.  CXR performed and lower lobe opacity appears unchanged.  Paracentesis done with 327 WBC but only 9% Neutrophils.   - 1/2 cultures from 7/7 growing Enterococcus faecalis and   - ID consulted, advised IV Ampicillin for 2 weeks     CKD stage IV w/NGUYEN vs worsening renal function   Baseline creatinine 2-2.2, presumably due to hepatorenal syndrome. Creatinine up to 2.71 during this  hospitalization. Nephrology was consulted, and he was treated with IV fluids and albumin  - Creatinine had plateaued in 2.5-2.8 range   - Continues on PTA midodrine  - Nephrology following and appreciate their recommendations      Hypervolemic hyponatremia. Resolved   Na fabiola 127, resolved with fluid restriction  - On 1500 mL fluid restricution     Pancytopenia due to alcoholic cirrhosis  CBC has been stable with no s/sx of bleeding or infection during this hospitalization  - Follow intermittently      Urinary retention  History of BPH  Coude catheter placed 6/29 for ongoing urinary retention  - Voiding trial today, 7/6  - He was initiated on finasteride during this hospitalization  - Continues on PTA tamsulosin     Acute alcohol withdrawal. Resolved  Alcohol dependence  Acutely intoxicated on arrival. BARNEY 0.34. Developed alcohol withdrawal following admission, and was treated with lorazepam per SHAUN. Patient is apparently under active commitment that was recently renewed for another year.  - CD recommending TCU with CD services     Major recurrent depressive disorder with anxiety  * PTA: gabapentin 300 mg TID, mirtazapine 30 mg qhs, vortioxetine 10 mg BID, quetiapine 200 mg qhs and 50 mg TID prn, eszopiclone 3 mg qhs prn sleep]  - Continues on PTA meds, no adjustments per Psych during this admission  - Renally dosing Neurontin      COPD  Chronic and stable on PTA inhalers     Nicotine dependence  Nicotine patch in place, reduce to 14 mg tomorrow     JANIS  Chronic and stable on CPAP     Umbilical Hernia  Chronic and stable. Recently evaluated by General Surgery; no intervention recommended     Physical deconditioning in context of acute on chronic medical illness  - PT/OT recommending TCU         Diet: Fluid restriction 1500 ML FLUID  Snacks/Supplements Pediatric: Ensure Enlive; Between Meals  2 Gram Sodium Diet    DVT Prophylaxis: Pneumatic Compression Devices  Leger Catheter: Not present  Central Lines: None  Code  "Status: No CPR- Do NOT Intubate      Disposition Plan   Expected discharge: TCU in 3 days, will need PICC on discharge (when cultures negative)      The patient's care was discussed with the Bedside Nurse, Care Coordinator/ and Patient.    Gray Vincent MD  Hospitalist Service  [facility]  Securely message with the Vocera Web Console (learn more here)  Text page via [employer] Paging/Directory               ______________________________________________________________________    Interval History   Mild abdominal pain -- continues to use Oxycodone 10 mg several times a day because he feels \"bloated\".  Bowels loose and has BM twice a day.     Physical Exam   Vital Signs: Temp: 97.9  F (36.6  C) Temp src: Oral BP: 135/67 Pulse: 60   Resp: 16 SpO2: 96 % O2 Device: None (Room air)    Weight: 180 lbs 11.2 oz  General Appearance: Resting comfortably.  NAD   Respiratory: Clear to auscultation.  No respiratory distress  Cardiovascular: RRR. No obvious murmurs  GI: Soft.  Moderately distended, minimal tenderness  Skin: No obvious rashes or cyanosis  Extrem: trace edema.  No calf tenderness    Neuro: alert, Ox3    Data   Recent Labs   Lab 07/12/21  0818 07/11/21  0628 07/10/21  0714 07/08/21  1057 07/07/21  0827 07/06/21  0732   WBC 3.9*  --   --  2.9*  --  2.9*   HGB 8.2*  --   --  7.2*  --  8.4*   MCV 94  --   --  95  --  93   *  --   --  115*  --  134*    132* 135 133 134 135   POTASSIUM 4.1 4.3 4.3 3.8 4.0 4.0   CHLORIDE 102 102 105 103 103 104   CO2 23 23 24 23 26 23   BUN 33* 32* 32* 33* 32* 34*   CR 2.71* 2.74* 2.83* 2.82* 2.84* 2.81*   ANIONGAP 8 7 6 7 5 8   KEV 8.6 8.1* 8.1* 8.1* 8.4* 8.5   GLC 84 79 81 134* 108* 88   ALBUMIN  --   --  2.8*  --  3.1* 2.9*   PROTTOTAL  --   --   --   --   --  6.3*   BILITOTAL  --   --   --   --   --  0.5   ALKPHOS  --   --   --   --   --  181*   ALT  --   --   --   --   --  20   AST  --   --   --   --   --  21     No results " found for this or any previous visit (from the past 24 hour(s)).

## 2021-07-12 NOTE — PROGRESS NOTES
[facility]    Medicine Progress Note - Hospitalist Service       Date of Admission:  6/25/2021    Assessment & Plan            66 year old male with hx of alcoholic cirrhosis with ascites and ongoing alcohol use who was admitted on 6/25/2021 for recurrent ascites. Hospital course complicated by alcohol withdrawal which has now resolved. He is under an active commitment      SBP -- enterococcus on blood culture, presumed from ascites fluid, no actual culture from abd fluid   -- on IV Ampicillin 2 gm q8 hr until 7/21   -- Midline for IV antibiotics at TCU ([facility])    Alcoholic cirrhosis with ascities   -- will try to limit Paracentesis so as to not worsen his NGUYEN, limit to every 7 days or longer   -- start Spironolactone 25 mg daily, increase as tolerated     CKD stage IV w/NGUYEN vs worsening renal function -- at risk for hepatorenal syndrome  Baseline creatinine 2-2.2, presumably due to hepatorenal syndrome. Creatinine up to 2.71 during this hospitalization. Nephrology was consulted, and he was treated with IV fluids and albumin  - Creatinine had plateaued in 2.5-2.8 range   - Continues on PTA midodrine     Hypervolemic hyponatremia. Resolved   Na fabiola 127, resolved with fluid restriction  - On 1500 mL fluid restricution     Pancytopenia due to alcoholic cirrhosis  CBC has been stable with no s/sx of bleeding or infection during this hospitalization  - Follow intermittently      Urinary retention  History of BPH  Coude catheter placed 6/29 for ongoing urinary retention  - Voiding trial today, 7/6  - He was initiated on finasteride during this hospitalization  - Continues on PTA tamsulosin     Acute alcohol withdrawal. Resolved  Alcohol dependence  Acutely intoxicated on arrival. BARNEY 0.34. Developed alcohol withdrawal following admission, and was treated with lorazepam per SHAUN. Patient is apparently under active commitment that was recently renewed for another year.  - CD recommending  TCU with CD services  - daughter who is POA thought he was on a commitment, but he has no appointed guardian yet (left message for PCP to see if he is acting as guardian -- his children do not want to be involved)     Major recurrent depressive disorder with anxiety  * PTA: gabapentin 300 mg TID, mirtazapine 30 mg qhs, vortioxetine 10 mg BID, quetiapine 200 mg qhs and 50 mg TID prn, eszopiclone 3 mg qhs prn sleep]  - Continues on PTA meds, no adjustments per Psych during this admission  - Renally dosing Neurontin      COPD  Chronic and stable on PTA inhalers     Nicotine dependence  Nicotine patch in place, reduce to 14 mg tomorrow     JANIS  Chronic and stable on CPAP     Umbilical Hernia  Chronic and stable. Recently evaluated by General Surgery; no intervention recommended     Physical deconditioning in context of acute on chronic medical illness  - PT/OT recommending TCU         Diet: Fluid restriction 1500 ML FLUID  Snacks/Supplements Pediatric: Ensure Enlive; Between Meals  2 Gram Sodium Diet    DVT Prophylaxis: Pneumatic Compression Devices  Leger Catheter: Not present  Central Lines: None  Code Status: No CPR- Do NOT Intubate      Disposition Plan   Expected discharge: TCU tomorrow if no new problems (discussed with daughter and PCP's office)     Gray Vincent MD  Hospitalist Service  [facility]  Securely message with the Vocera Web Console (learn more here)  Text page via [employer] Paging/Directory               ______________________________________________________________________    Interval History   Mild abdominal pain, requesting Paracentesis because he feels bloated.     Physical Exam   Vital Signs: Temp: 97.9  F (36.6  C) Temp src: Oral BP: 135/67 Pulse: 60   Resp: 16 SpO2: 96 % O2 Device: None (Room air)    Weight: 180 lbs 11.2 oz  General Appearance: Resting comfortably.  NAD   Respiratory: Clear to auscultation.  No respiratory distress  Cardiovascular: RRR. No  obvious murmurs  GI: Soft.  Moderately distended, minimal tenderness  Skin: No obvious rashes or cyanosis  Extrem: trace edema.  No calf tenderness    Neuro: alert, Ox2.5    Data   Recent Labs   Lab 07/12/21  0818 07/11/21  0628 07/10/21  0714 07/08/21  1057 07/07/21  0827 07/06/21  0732   WBC 3.9*  --   --  2.9*  --  2.9*   HGB 8.2*  --   --  7.2*  --  8.4*   MCV 94  --   --  95  --  93   *  --   --  115*  --  134*    132* 135 133 134 135   POTASSIUM 4.1 4.3 4.3 3.8 4.0 4.0   CHLORIDE 102 102 105 103 103 104   CO2 23 23 24 23 26 23   BUN 33* 32* 32* 33* 32* 34*   CR 2.71* 2.74* 2.83* 2.82* 2.84* 2.81*   ANIONGAP 8 7 6 7 5 8   KEV 8.6 8.1* 8.1* 8.1* 8.4* 8.5   GLC 84 79 81 134* 108* 88   ALBUMIN  --   --  2.8*  --  3.1* 2.9*   PROTTOTAL  --   --   --   --   --  6.3*   BILITOTAL  --   --   --   --   --  0.5   ALKPHOS  --   --   --   --   --  181*   ALT  --   --   --   --   --  20   AST  --   --   --   --   --  21     No results found for this or any previous visit (from the past 24 hour(s)).

## 2021-07-12 NOTE — PROVIDER NOTIFICATION
"MD Notification    Notified Person: MD    Notified Person Name: Katy    Notification Date/Time: 07/12/21 12:57 PM    Notification Interaction: online paging    Purpose of Notification: \"Pt feels abd full, states worse pain, asking about a paracentesis?\"    Orders Received: unlikely at this time, will likely make renal function worse, will discuss with nephrology to confirm plan    Comments:  "

## 2021-07-12 NOTE — PROGRESS NOTES
Care Management Follow Up    Length of Stay (days): 16    Expected Discharge Date:       Concerns to be Addressed: discharge planning     Patient plan of care discussed at interdisciplinary rounds: Yes    Anticipated Discharge Disposition:  [facility]     Anticipated Discharge Services:    Anticipated Discharge DME:      Patient/family educated on Medicare website which has current facility and service quality ratings:    Education Provided on the Discharge Plan:    Patient/Family in Agreement with the Plan:      Referrals Placed by CM/SW:    Private pay costs discussed: Not applicable    Additional Information:  SW received call from Dania with [facility] and they are able to accept patient at their facility when ready for discharge.  SW paged physician to update and inquire about discharge date.       MIRTA Navarro

## 2021-07-12 NOTE — PROGRESS NOTES
Assessment and Plan:   CKD-4; felt to have HRS with low Lona.     Stable to improved Cr at 2.71.   On midodrine. Previously on saline and alb IV. Hemodynamically stable.   I/O 1200/850.   On 2 gm Na diet, 1500 ml FR.     Date/Time Weight Who   07/12/21 0342 82 kg (180 lb 11.2 oz) DF   07/11/21 0700 81.6 kg (180 lb) RD   07/10/21 0708 79.1 kg (174 lb 6.4 oz) RD   07/09/21 0500 78.1 kg (172 lb 1.6 oz) PC   07/08/21 0700 75.4 kg (166 lb 3.2 oz)      Receiving MagOx.    Monitor labs 1-2 times per week. Cont current meds. We can see in clinic after discharge for F/U as desired.       Interval History:   Cirrhosis with ascites and need for frequent paracentesis.        Alcoholism   ID: on ampicillin.   BPH: on tamsulosin, finasteride.   Discharge planning: lidkely going to TCU.           Review of Systems:   C/O abd distention. Some abd pain. No SOB, chest pain N or V. Taking po well.           Medications:       ampicillin  2 g Intravenous Q8H     finasteride  5 mg Oral Daily     folic acid  1 mg Oral Daily     gabapentin  200 mg Oral TID     lactulose  20 g Oral BID     magnesium oxide  400 mg Oral Daily     miconazole   Topical BID     midodrine  10 mg Oral TID w/meals     mirtazapine  30 mg Oral At Bedtime     multivitamin w/minerals  1 tablet Oral Daily     nicotine  1 patch Transdermal Q24H     nicotine   Transdermal Q8H     pantoprazole  40 mg Oral BID     QUEtiapine  200 mg Oral At Bedtime     sodium chloride (PF)  3 mL Intracatheter Q8H     tamsulosin  0.8 mg Oral Daily     traZODone  100 mg Oral At Bedtime     vortioxetine  10 mg Oral BID         Current active medications and PTA medications reviewed, see medication list for details.            Physical Exam:   Vitals were reviewed  Patient Vitals for the past 24 hrs:   BP Temp Temp src Pulse Resp SpO2 Weight   07/12/21 0843 -- -- -- -- 16 -- --   07/12/21 0720 135/67 97.9  F (36.6  C) Oral 60 16 96 % --   07/12/21 0415 125/60 98.3  F (36.8  C) Oral 57  16 96 % --   21 0342 -- -- -- -- -- -- 82 kg (180 lb 11.2 oz)   21 1542 135/64 97.2  F (36.2  C) Oral 60 16 95 % --       Temp:  [97.2  F (36.2  C)-98.3  F (36.8  C)] 97.9  F (36.6  C)  Pulse:  [57-60] 60  Resp:  [16] 16  BP: (125-135)/(60-67) 135/67  SpO2:  [95 %-96 %] 96 %    Temperatures:  Current - Temp: 97.9  F (36.6  C); Max - Temp  Av.8  F (36.6  C)  Min: 97.2  F (36.2  C)  Max: 98.3  F (36.8  C)  Respiration range: Resp  Av  Min: 16  Max: 16  Pulse range: Pulse  Av  Min: 57  Max: 60  Blood pressure range: Systolic (24hrs), Av , Min:125 , Max:135   ; Diastolic (24hrs), Av, Min:60, Max:67    Pulse oximetry range: SpO2  Av.7 %  Min: 95 %  Max: 96 %    I/O last 3 completed shifts:  In: 1320 [P.O.:1320]  Out: 1225 [Urine:1225]      Intake/Output Summary (Last 24 hours) at 2021 1121  Last data filed at 2021 1120  Gross per 24 hour   Intake 1230 ml   Output 1325 ml   Net -95 ml       Alert, sitting up in chair  Lungs with clear BS  Cor RRR nl S1 S2 no M  Abd distended, tense, ++ fluid wave.  LE 1+ edema       Wt Readings from Last 4 Encounters:   21 82 kg (180 lb 11.2 oz)   21 81.1 kg (178 lb 11.2 oz)   21 86.2 kg (190 lb)   21 86.2 kg (190 lb)          Data:          Lab Results   Component Value Date     2021     2021     07/10/2021     2021    Lab Results   Component Value Date    CHLORIDE 102 2021    CHLORIDE 102 2021    CHLORIDE 105 07/10/2021    CHLORIDE 104 2021    Lab Results   Component Value Date    BUN 33 2021    BUN 32 2021    BUN 32 07/10/2021    BUN 32 2021      Lab Results   Component Value Date    POTASSIUM 4.1 2021    POTASSIUM 4.3 2021    POTASSIUM 4.3 07/10/2021    POTASSIUM 4.2 2021    Lab Results   Component Value Date    CO2 23 2021    CO2 23 2021    CO2 24 07/10/2021    CO2 25 2021    Lab Results    Component Value Date    CR 2.71 07/12/2021    CR 2.74 07/11/2021    CR 2.83 07/10/2021    CR 2.91 07/09/2021        Recent Labs   Lab Test 07/12/21  0818 07/08/21  1057 07/06/21  0732   WBC 3.9* 2.9* 2.9*   HGB 8.2* 7.2* 8.4*   HCT 25.7* 22.2* 26.3*   MCV 94 95 93   * 115* 134*     Recent Labs   Lab Test 07/06/21  0732 07/04/21  0809 07/02/21  0816 06/30/21  1140 06/25/21  1931 01/12/15  2305 06/21/14  0722   AST 21 26 25  --  34   < >  --    ALT 20 22 22  --  22   < >  --    GGT  --   --   --   --   --   --  500*   ALKPHOS 181* 170* 185*  --  218*   < >  --    BILITOTAL 0.5 1.0 0.9  --  0.7   < >  --    ELPIDIO  --   --  39 66* 33   < >  --     < > = values in this interval not displayed.       Recent Labs   Lab Test 07/03/21  0712 06/30/21  0732 06/28/21  0754   MAG 1.9 1.9 1.9     Recent Labs   Lab Test 07/10/21  0714 07/07/21  0827 07/03/21  0712   PHOS 4.7* 2.8 3.4     Recent Labs   Lab Test 07/12/21  0818 07/11/21  0628 07/10/21  0714   KEV 8.6 8.1* 8.1*       Lab Results   Component Value Date    KEV 8.6 07/12/2021     Lab Results   Component Value Date    WBC 3.9 (L) 07/12/2021    HGB 8.2 (L) 07/12/2021    HCT 25.7 (L) 07/12/2021    MCV 94 07/12/2021     (L) 07/12/2021     Lab Results   Component Value Date     07/12/2021    POTASSIUM 4.1 07/12/2021    CHLORIDE 102 07/12/2021    CO2 23 07/12/2021    GLC 84 07/12/2021     Lab Results   Component Value Date    BUN 33 (H) 07/12/2021    CR 2.71 (H) 07/12/2021     Lab Results   Component Value Date    MAG 1.9 07/03/2021     Lab Results   Component Value Date    PHOS 4.7 (H) 07/10/2021       Creatinine   Date Value Ref Range Status   07/12/2021 2.71 (H) 0.66 - 1.25 mg/dL Final   07/11/2021 2.74 (H) 0.66 - 1.25 mg/dL Final   07/10/2021 2.83 (H) 0.66 - 1.25 mg/dL Final   07/09/2021 2.91 (H) 0.66 - 1.25 mg/dL Final   07/08/2021 2.82 (H) 0.66 - 1.25 mg/dL Final   07/07/2021 2.84 (H) 0.66 - 1.25 mg/dL Final   07/06/2021 2.81 (H) 0.66 - 1.25 mg/dL  Final       Attestation:  I have reviewed today's vital signs, notes, medications, labs and imaging.     Arnaldo Frankel MD

## 2021-07-13 ENCOUNTER — APPOINTMENT (OUTPATIENT)
Dept: OCCUPATIONAL THERAPY | Facility: CLINIC | Age: 66
DRG: 432 | End: 2021-07-13
Payer: MEDICARE

## 2021-07-13 PROBLEM — R39.198 DIFFICULTY VOIDING: Status: RESOLVED | Noted: 2020-07-01 | Resolved: 2021-07-13

## 2021-07-13 PROBLEM — B95.2 BACTEREMIA DUE TO ENTEROCOCCUS: Status: ACTIVE | Noted: 2021-07-13

## 2021-07-13 PROBLEM — N17.9 ACUTE RENAL FAILURE, UNSPECIFIED ACUTE RENAL FAILURE TYPE (H): Status: RESOLVED | Noted: 2021-06-06 | Resolved: 2021-07-13

## 2021-07-13 PROBLEM — R78.81 BACTEREMIA DUE TO ENTEROCOCCUS: Status: ACTIVE | Noted: 2021-07-13

## 2021-07-13 PROBLEM — R00.1 BRADYCARDIA: Status: RESOLVED | Noted: 2020-10-01 | Resolved: 2021-07-13

## 2021-07-13 PROBLEM — N18.32 STAGE 3B CHRONIC KIDNEY DISEASE (H): Status: RESOLVED | Noted: 2021-03-12 | Resolved: 2021-07-13

## 2021-07-13 PROBLEM — R45.851 SUICIDAL IDEATION: Status: RESOLVED | Noted: 2017-12-21 | Resolved: 2021-07-13

## 2021-07-13 PROBLEM — Z09 S/P UMBILICAL HERNIA REPAIR, FOLLOW-UP EXAM: Status: RESOLVED | Noted: 2017-05-18 | Resolved: 2021-07-13

## 2021-07-13 PROBLEM — F10.220 ALCOHOL DEPENDENCE WITH UNCOMPLICATED INTOXICATION (H): Status: RESOLVED | Noted: 2021-03-12 | Resolved: 2021-07-13

## 2021-07-13 PROBLEM — F10.939 ALCOHOL WITHDRAWAL (H): Status: RESOLVED | Noted: 2021-03-12 | Resolved: 2021-07-13

## 2021-07-13 PROBLEM — F32.A DEPRESSION: Status: RESOLVED | Noted: 2017-02-08 | Resolved: 2021-07-13

## 2021-07-13 PROBLEM — K70.9 ALCOHOLIC LIVER DISEASE (H): Status: RESOLVED | Noted: 2021-02-09 | Resolved: 2021-07-13

## 2021-07-13 PROBLEM — E87.29 ALCOHOLIC KETOACIDOSIS: Status: RESOLVED | Noted: 2020-10-06 | Resolved: 2021-07-13

## 2021-07-13 PROBLEM — M54.9 CHRONIC BACK PAIN GREATER THAN 3 MONTHS DURATION: Status: RESOLVED | Noted: 2019-02-14 | Resolved: 2021-07-13

## 2021-07-13 PROBLEM — R10.32 ABDOMINAL PAIN, LEFT LOWER QUADRANT: Status: RESOLVED | Noted: 2020-10-06 | Resolved: 2021-07-13

## 2021-07-13 PROBLEM — D61.818 PANCYTOPENIA (H): Status: RESOLVED | Noted: 2021-03-12 | Resolved: 2021-07-13

## 2021-07-13 PROBLEM — G89.29 CHRONIC BACK PAIN GREATER THAN 3 MONTHS DURATION: Status: RESOLVED | Noted: 2019-02-14 | Resolved: 2021-07-13

## 2021-07-13 PROBLEM — R06.02 SHORTNESS OF BREATH: Status: RESOLVED | Noted: 2021-06-25 | Resolved: 2021-07-13

## 2021-07-13 PROBLEM — K76.7 HEPATORENAL SYNDROME (H): Status: ACTIVE | Noted: 2021-06-06

## 2021-07-13 PROBLEM — R29.6 FALLS FREQUENTLY: Status: RESOLVED | Noted: 2021-03-12 | Resolved: 2021-07-13

## 2021-07-13 LAB
ANION GAP SERPL CALCULATED.3IONS-SCNC: 7 MMOL/L (ref 3–14)
BACTERIA SPEC CULT: NO GROWTH
BUN SERPL-MCNC: 32 MG/DL (ref 7–30)
CALCIUM SERPL-MCNC: 8.4 MG/DL (ref 8.5–10.1)
CHLORIDE BLD-SCNC: 100 MMOL/L (ref 94–109)
CO2 SERPL-SCNC: 24 MMOL/L (ref 20–32)
CREAT SERPL-MCNC: 2.69 MG/DL (ref 0.66–1.25)
GFR SERPL CREATININE-BSD FRML MDRD: 23 ML/MIN/1.73M2
GLUCOSE BLD-MCNC: 77 MG/DL (ref 70–99)
Lab: NORMAL
POTASSIUM BLD-SCNC: 4.2 MMOL/L (ref 3.4–5.3)
SODIUM SERPL-SCNC: 131 MMOL/L (ref 133–144)
SPECIMEN SOURCE: NORMAL

## 2021-07-13 PROCEDURE — 250N000013 HC RX MED GY IP 250 OP 250 PS 637: Performed by: INTERNAL MEDICINE

## 2021-07-13 PROCEDURE — 99232 SBSQ HOSP IP/OBS MODERATE 35: CPT | Performed by: INTERNAL MEDICINE

## 2021-07-13 PROCEDURE — 250N000011 HC RX IP 250 OP 636: Performed by: SPECIALIST

## 2021-07-13 PROCEDURE — 120N000001 HC R&B MED SURG/OB

## 2021-07-13 PROCEDURE — 36569 INSJ PICC 5 YR+ W/O IMAGING: CPT

## 2021-07-13 PROCEDURE — 97530 THERAPEUTIC ACTIVITIES: CPT | Mod: GO | Performed by: OCCUPATIONAL THERAPIST

## 2021-07-13 PROCEDURE — 80048 BASIC METABOLIC PNL TOTAL CA: CPT | Performed by: INTERNAL MEDICINE

## 2021-07-13 PROCEDURE — 272N000433 HC KIT CATH IV 18 OR 20G CM, POWERGLIDE W MAX BARRIER

## 2021-07-13 PROCEDURE — 250N000013 HC RX MED GY IP 250 OP 250 PS 637: Performed by: HOSPITALIST

## 2021-07-13 PROCEDURE — 36415 COLL VENOUS BLD VENIPUNCTURE: CPT | Performed by: INTERNAL MEDICINE

## 2021-07-13 PROCEDURE — 999N000040 HC STATISTIC CONSULT NO CHARGE VASC ACCESS

## 2021-07-13 PROCEDURE — 250N000009 HC RX 250: Performed by: INTERNAL MEDICINE

## 2021-07-13 RX ORDER — ALBUTEROL SULFATE 90 UG/1
2 AEROSOL, METERED RESPIRATORY (INHALATION) EVERY 4 HOURS PRN
Status: ON HOLD | DISCHARGE
Start: 2021-07-13 | End: 2021-08-22

## 2021-07-13 RX ORDER — LACTULOSE 10 G/15ML
10 SOLUTION ORAL 2 TIMES DAILY
Status: ON HOLD | DISCHARGE
Start: 2021-07-13 | End: 2021-08-22

## 2021-07-13 RX ORDER — MIDODRINE HYDROCHLORIDE 10 MG/1
10 TABLET ORAL
Status: ON HOLD | DISCHARGE
Start: 2021-07-13 | End: 2021-08-22

## 2021-07-13 RX ORDER — GABAPENTIN 100 MG/1
200 CAPSULE ORAL 3 TIMES DAILY
Status: ON HOLD | DISCHARGE
Start: 2021-07-13 | End: 2021-08-22

## 2021-07-13 RX ORDER — SPIRONOLACTONE 25 MG/1
25 TABLET ORAL
Status: ON HOLD | DISCHARGE
Start: 2021-07-13 | End: 2021-08-22

## 2021-07-13 RX ORDER — TRAZODONE HYDROCHLORIDE 100 MG/1
100 TABLET ORAL AT BEDTIME
Status: ON HOLD | DISCHARGE
Start: 2021-07-13 | End: 2021-08-22

## 2021-07-13 RX ORDER — FINASTERIDE 5 MG/1
5 TABLET, FILM COATED ORAL DAILY
Status: ON HOLD | DISCHARGE
Start: 2021-07-14 | End: 2021-08-22

## 2021-07-13 RX ORDER — ONDANSETRON 4 MG/1
4 TABLET, ORALLY DISINTEGRATING ORAL EVERY 6 HOURS PRN
Status: ON HOLD | DISCHARGE
Start: 2021-07-13 | End: 2021-08-22

## 2021-07-13 RX ORDER — OXYCODONE HYDROCHLORIDE 5 MG/1
2.5 TABLET ORAL EVERY 6 HOURS PRN
Qty: 12 TABLET | Refills: 0 | Status: ON HOLD | OUTPATIENT
Start: 2021-07-13 | End: 2021-08-22

## 2021-07-13 RX ORDER — DISULFIRAM 250 MG/1
250 TABLET ORAL DAILY
Status: DISCONTINUED | OUTPATIENT
Start: 2021-07-13 | End: 2021-07-14 | Stop reason: HOSPADM

## 2021-07-13 RX ORDER — DISULFIRAM 250 MG/1
250 TABLET ORAL DAILY
Status: ON HOLD | DISCHARGE
Start: 2021-07-14 | End: 2021-08-22

## 2021-07-13 RX ORDER — NICOTINE 21 MG/24HR
1 PATCH, TRANSDERMAL 24 HOURS TRANSDERMAL DAILY
DISCHARGE
Start: 2021-07-13 | End: 2021-07-20

## 2021-07-13 RX ORDER — LACTULOSE 10 G/15ML
10 SOLUTION ORAL 2 TIMES DAILY
Status: DISCONTINUED | OUTPATIENT
Start: 2021-07-13 | End: 2021-07-14 | Stop reason: HOSPADM

## 2021-07-13 RX ORDER — SPIRONOLACTONE 25 MG/1
25 TABLET ORAL
Status: DISCONTINUED | OUTPATIENT
Start: 2021-07-13 | End: 2021-07-14 | Stop reason: HOSPADM

## 2021-07-13 RX ORDER — HYDROXYZINE HYDROCHLORIDE 25 MG/1
25 TABLET, FILM COATED ORAL EVERY 4 HOURS PRN
DISCHARGE
Start: 2021-07-13 | End: 2021-08-04

## 2021-07-13 RX ADMIN — QUETIAPINE FUMARATE 50 MG: 25 TABLET ORAL at 13:06

## 2021-07-13 RX ADMIN — AMPICILLIN SODIUM 2 G: 2 INJECTION, POWDER, FOR SOLUTION INTRAVENOUS at 21:23

## 2021-07-13 RX ADMIN — FINASTERIDE 5 MG: 5 TABLET, FILM COATED ORAL at 08:39

## 2021-07-13 RX ADMIN — LACTULOSE 20 G: 10 SOLUTION ORAL at 08:41

## 2021-07-13 RX ADMIN — MIDODRINE HYDROCHLORIDE 10 MG: 5 TABLET ORAL at 18:17

## 2021-07-13 RX ADMIN — GABAPENTIN 200 MG: 100 CAPSULE ORAL at 08:36

## 2021-07-13 RX ADMIN — QUETIAPINE 200 MG: 200 TABLET, FILM COATED ORAL at 21:24

## 2021-07-13 RX ADMIN — MULTIPLE VITAMINS W/ MINERALS TAB 1 TABLET: TAB at 08:36

## 2021-07-13 RX ADMIN — OXYCODONE HYDROCHLORIDE 5 MG: 5 TABLET ORAL at 12:09

## 2021-07-13 RX ADMIN — AMPICILLIN SODIUM 2 G: 2 INJECTION, POWDER, FOR SOLUTION INTRAVENOUS at 13:06

## 2021-07-13 RX ADMIN — MICONAZOLE NITRATE: 20 POWDER TOPICAL at 21:32

## 2021-07-13 RX ADMIN — OXYCODONE HYDROCHLORIDE 5 MG: 5 TABLET ORAL at 08:39

## 2021-07-13 RX ADMIN — AMPICILLIN SODIUM 2 G: 2 INJECTION, POWDER, FOR SOLUTION INTRAVENOUS at 06:31

## 2021-07-13 RX ADMIN — DISULFIRAM 250 MG: 250 TABLET ORAL at 12:09

## 2021-07-13 RX ADMIN — OXYCODONE HYDROCHLORIDE 5 MG: 5 TABLET ORAL at 21:36

## 2021-07-13 RX ADMIN — MIRTAZAPINE 30 MG: 15 TABLET, FILM COATED ORAL at 21:24

## 2021-07-13 RX ADMIN — GABAPENTIN 200 MG: 100 CAPSULE ORAL at 21:24

## 2021-07-13 RX ADMIN — MIDODRINE HYDROCHLORIDE 10 MG: 5 TABLET ORAL at 12:13

## 2021-07-13 RX ADMIN — SPIRONOLACTONE 25 MG: 25 TABLET, FILM COATED ORAL at 17:48

## 2021-07-13 RX ADMIN — VORTIOXETINE 10 MG: 10 TABLET, FILM COATED ORAL at 21:24

## 2021-07-13 RX ADMIN — SPIRONOLACTONE 25 MG: 25 TABLET, FILM COATED ORAL at 08:37

## 2021-07-13 RX ADMIN — GABAPENTIN 200 MG: 100 CAPSULE ORAL at 17:48

## 2021-07-13 RX ADMIN — MIDODRINE HYDROCHLORIDE 10 MG: 5 TABLET ORAL at 08:39

## 2021-07-13 RX ADMIN — MAGNESIUM OXIDE TAB 400 MG (241.3 MG ELEMENTAL MG) 400 MG: 400 (241.3 MG) TAB at 08:37

## 2021-07-13 RX ADMIN — LACTULOSE 10 G: 10 SOLUTION ORAL at 21:23

## 2021-07-13 RX ADMIN — TAMSULOSIN HYDROCHLORIDE 0.8 MG: 0.4 CAPSULE ORAL at 08:36

## 2021-07-13 RX ADMIN — VORTIOXETINE 10 MG: 10 TABLET, FILM COATED ORAL at 08:39

## 2021-07-13 RX ADMIN — PANTOPRAZOLE SODIUM 40 MG: 40 TABLET, DELAYED RELEASE ORAL at 21:24

## 2021-07-13 RX ADMIN — FOLIC ACID 1 MG: 1 TABLET ORAL at 08:37

## 2021-07-13 RX ADMIN — PANTOPRAZOLE SODIUM 40 MG: 40 TABLET, DELAYED RELEASE ORAL at 08:39

## 2021-07-13 RX ADMIN — LIDOCAINE HYDROCHLORIDE ANHYDROUS 1 ML: 10 INJECTION, SOLUTION INFILTRATION at 09:30

## 2021-07-13 RX ADMIN — NICOTINE 1 PATCH: 14 PATCH, EXTENDED RELEASE TRANSDERMAL at 21:23

## 2021-07-13 ASSESSMENT — ACTIVITIES OF DAILY LIVING (ADL)
ADLS_ACUITY_SCORE: 11

## 2021-07-13 ASSESSMENT — MIFFLIN-ST. JEOR: SCORE: 1554.64

## 2021-07-13 NOTE — PROGRESS NOTES
Assessment and Plan:   NGUYEN: likely HRS initially, now improving. Cr gradually improving. Baseline CKD-4.   On 1500 ml FR. UO yest 1425 ml.     Date/Time Weight Who   07/13/21 0700 82.1 kg (181 lb) DF   07/12/21 0342 82 kg (180 lb 11.2 oz) DF   07/11/21 0700 81.6 kg (180 lb)      Stable, non-oliguric CKD.   We will sign off. F/U in our clinic in 3-4 weeks if desired.            Interval History:   Alcoholism  Cirrhosis with ascites: weekly paracentesis.   Pancytopenia      COPD            Review of Systems:   C/O abd discomfort and distention.           Medications:       ampicillin  2 g Intravenous Q8H     finasteride  5 mg Oral Daily     folic acid  1 mg Oral Daily     gabapentin  200 mg Oral TID     lactulose  20 g Oral BID     magnesium oxide  400 mg Oral Daily     miconazole   Topical BID     midodrine  10 mg Oral TID w/meals     mirtazapine  30 mg Oral At Bedtime     multivitamin w/minerals  1 tablet Oral Daily     nicotine  1 patch Transdermal Daily     nicotine   Transdermal Q8H     pantoprazole  40 mg Oral BID     QUEtiapine  200 mg Oral At Bedtime     sodium chloride (PF)  10 mL Intracatheter Q8H     sodium chloride (PF)  3 mL Intracatheter Q8H     spironolactone  25 mg Oral Daily     tamsulosin  0.8 mg Oral Daily     traZODone  100 mg Oral At Bedtime     vortioxetine  10 mg Oral BID         Current active medications and PTA medications reviewed, see medication list for details.            Physical Exam:   Vitals were reviewed  Patient Vitals for the past 24 hrs:   BP Temp Temp src Pulse Resp SpO2 Weight   07/13/21 0700 125/60 97.6  F (36.4  C) Oral 103 16 92 % 82.1 kg (181 lb)   07/12/21 2334 (!) 145/67 97.3  F (36.3  C) Oral 58 16 96 % --   07/12/21 1713 (!) 140/48 97.9  F (36.6  C) Oral 65 16 96 % --       Temp:  [97.3  F (36.3  C)-97.9  F (36.6  C)] 97.6  F (36.4  C)  Pulse:  [] 103  Resp:  [16] 16  BP: (125-145)/(48-67) 125/60  SpO2:  [92 %-96 %] 92 %    Temperatures:  Current - Temp:  97.6  F (36.4  C); Max - Temp  Av.6  F (36.4  C)  Min: 97.3  F (36.3  C)  Max: 97.9  F (36.6  C)  Respiration range: Resp  Av  Min: 16  Max: 16  Pulse range: Pulse  Av.3  Min: 58  Max: 103  Blood pressure range: Systolic (24hrs), Av , Min:125 , Max:145   ; Diastolic (24hrs), Av, Min:48, Max:67    Pulse oximetry range: SpO2  Av.7 %  Min: 92 %  Max: 96 %    I/O last 3 completed shifts:  In: 970 [P.O.:970]  Out: 1700 [Urine:1700]      Intake/Output Summary (Last 24 hours) at 2021 0925  Last data filed at 2021 0745  Gross per 24 hour   Intake 1520 ml   Output 1400 ml   Net 120 ml       Alert, resting in bed  Mid-Line catheter in process       Wt Readings from Last 4 Encounters:   21 82.1 kg (181 lb)   21 81.1 kg (178 lb 11.2 oz)   21 86.2 kg (190 lb)   21 86.2 kg (190 lb)          Data:          Lab Results   Component Value Date     2021     2021     2021     07/10/2021     2021    Lab Results   Component Value Date    CHLORIDE 100 2021    CHLORIDE 102 2021    CHLORIDE 102 2021    CHLORIDE 105 07/10/2021    CHLORIDE 104 2021    Lab Results   Component Value Date    BUN 32 2021    BUN 33 2021    BUN 32 2021    BUN 32 07/10/2021    BUN 32 2021      Lab Results   Component Value Date    POTASSIUM 4.2 2021    POTASSIUM 4.1 2021    POTASSIUM 4.3 2021    POTASSIUM 4.3 07/10/2021    POTASSIUM 4.2 2021    Lab Results   Component Value Date    CO2 24 2021    CO2 23 2021    CO2 23 2021    CO2 24 07/10/2021    CO2 25 2021    Lab Results   Component Value Date    CR 2.69 2021    CR 2.71 2021    CR 2.74 2021    CR 2.83 07/10/2021    CR 2.91 2021        Recent Labs   Lab Test 21  0818 21  1057 21  0732   WBC 3.9* 2.9* 2.9*   HGB 8.2* 7.2* 8.4*   HCT 25.7* 22.2* 26.3*   MCV 94 95 93    * 115* 134*     Recent Labs   Lab Test 07/06/21  0732 07/04/21  0809 07/02/21  0816 06/30/21  1140 06/25/21  1931 01/12/15  2305 06/21/14  0722   AST 21 26 25  --  34   < >  --    ALT 20 22 22  --  22   < >  --    GGT  --   --   --   --   --   --  500*   ALKPHOS 181* 170* 185*  --  218*   < >  --    BILITOTAL 0.5 1.0 0.9  --  0.7   < >  --    ELPIDIO  --   --  39 66* 33   < >  --     < > = values in this interval not displayed.       Recent Labs   Lab Test 07/03/21  0712 06/30/21  0732 06/28/21  0754   MAG 1.9 1.9 1.9     Recent Labs   Lab Test 07/10/21  0714 07/07/21  0827 07/03/21  0712   PHOS 4.7* 2.8 3.4     Recent Labs   Lab Test 07/13/21  0724 07/12/21  0818 07/11/21  0628   KEV 8.4* 8.6 8.1*       Lab Results   Component Value Date    KEV 8.4 (L) 07/13/2021     Lab Results   Component Value Date    WBC 3.9 (L) 07/12/2021    HGB 8.2 (L) 07/12/2021    HCT 25.7 (L) 07/12/2021    MCV 94 07/12/2021     (L) 07/12/2021     Lab Results   Component Value Date     (L) 07/13/2021    POTASSIUM 4.2 07/13/2021    CHLORIDE 100 07/13/2021    CO2 24 07/13/2021    GLC 77 07/13/2021     Lab Results   Component Value Date    BUN 32 (H) 07/13/2021    CR 2.69 (H) 07/13/2021     Lab Results   Component Value Date    MAG 1.9 07/03/2021     Lab Results   Component Value Date    PHOS 4.7 (H) 07/10/2021       Creatinine   Date Value Ref Range Status   07/13/2021 2.69 (H) 0.66 - 1.25 mg/dL Final   07/12/2021 2.71 (H) 0.66 - 1.25 mg/dL Final   07/11/2021 2.74 (H) 0.66 - 1.25 mg/dL Final   07/10/2021 2.83 (H) 0.66 - 1.25 mg/dL Final   07/09/2021 2.91 (H) 0.66 - 1.25 mg/dL Final   07/08/2021 2.82 (H) 0.66 - 1.25 mg/dL Final   07/07/2021 2.84 (H) 0.66 - 1.25 mg/dL Final   07/06/2021 2.81 (H) 0.66 - 1.25 mg/dL Final       Attestation:  I have reviewed today's vital signs, notes, medications, labs and imaging.     Arnaldo Frankel MD

## 2021-07-13 NOTE — PLAN OF CARE
A&Ox4. VSS on RA - declined CPAP overnight. C/O abdominal pain, PRN oxy x1 & atarax x1 effective. R PIV SL w/ intermittent abx. 2g Na diet w/ 1500 mL FR. SBA. Plan for discharge 7/13 to TCU.

## 2021-07-13 NOTE — DISCHARGE SUMMARY
"[facility]    Discharge Summary  Hospitalist    Date of Admission:  6/25/2021  Date of Discharge:  7/14/2021   Discharging Provider: Gray Vincent MD    Discharge Diagnoses   Principal Problem:    End-stage Alcoholic Cirrhosis w Ascites    Hepatorenal syndrome (H)    Bacteremia due to Enterococcus  Active Problems:    Anxiety and depression    Alcoholic hepatitis    Essential hypertension    COPD (chronic obstructive pulmonary disease) (H)    Smoker    JANIS, non-compliant with CPAP    Severe Alcohol dependence with withdrawal    Alcoholic intoxication without complication (H)    Thrombocytopenia     Pancytopenia      History of Present Illness   66 year old male chronic alcoholism, cirrhosis and ascites.  He undergoes weekly paracenteses but missed his procedure yesterday because he was out drinking alcohol.  He presented to the emergency room today complaining of abdominal distention.  He was found to be intoxicated.  He is going to be admitted so he can undergo a therapeutic paracentesis tomorrow.  He is feeling little short of breath due to the marked abdominal distention.  It is also very uncomfortable.  He has been constipated.  He has been drinking vodka regularly and his alcohol level was 0.34.  EKG showed sinus rhythm with an old right bundle branch block.  Chest x-ray was negative.  Labs showed hemoglobin 8.4 g lactic acid 3.6 ammonia 33 INR 1.24 creatinine 2.08.  Glucose was low and he was treated with 50% dextrose.  He also received IV Dilaudid for the abdominal pain.       Hospital Course   Admitted to medical floor, and the following problems were addressed:     Alcoholic cirrhosis with recurrent ascities  Was requesting and getting paracentesis every 3-5 days because of \"abdominal bloating\", and given IV Albumin afterwards to minimize acute renal injury, and was followed by nephrology.  Discussed minimizing paracentesis to minimize potential renal injury from prerenal " state, and also minimizing narcotics (was taking Oxycodone 10 mg q4hr prn for bloating feeling).  At discharge his Creatinine was slightly improved to 2.61.     Enterococcus Bacteremia  While here did have fever to 101.2 degrees, blood culture grew Enterococcus, treated with IV Ampicillin, and ID Consult advise 2 weeks of IV therapy.  Did have paracentesis with cell count, on 6/25 149 WBC's present, and on 7/7 327 WBC's present but only 9% Neutrophils, and source of bacteremia not found.       CKD stage IV w/NGUYEN vs worsening renal function   Baseline creatinine 2-2.2, presumably due to hepatorenal syndrome. Creatinine up to 2.8, down to 2.69 at discharge, will minimize paracentesis as above.     Hypervolemic hyponatremia. Resolved   Sodium fabiola 127, improved with fluid restriction.      Pancytopenia due to alcoholic cirrhosis  CBC has been stable with no s/sx of bleeding or infection during this hospitalization  - Follow intermittently      Urinary retention  History of BPH  Coude catheter placed 6/29 for ongoing urinary retention  - Voiding trial today, 7/6  - He was initiated on finasteride during this hospitalization, and will continue Flomax.      Acute alcohol withdrawal. Resolved  Alcohol dependence  Acutely intoxicated on arrival. BARNEY 0.34. Developed alcohol withdrawal following admission, and was treated with lorazepam per [facility]WA. Patient is apparently under active commitment that was recently renewed for another year.  - CD recommending TCU with CD services.  This is an ongoing problem with no solution, unless he chooses to quit drinking.      Major recurrent depressive disorder with anxiety  On gabapentin 300 mg TID, mirtazapine 30 mg qhs, vortioxetine 10 mg BID, quetiapine 200 mg qhs and 50 mg TID prn, eszopiclone 3 mg qhs prn sleep]  - Continues current meds, no adjustments per Psych during this admission  - Renally dosing Neurontin at 200 mg tid.      COPD  Chronic and stable on albuterol  inhalers     Nicotine dependence  Nicotine patch in place, reduce to 14 mg for 1 week, then 7 for 2 weeks then discontinue.      JANIS  Chronic, but not using CPAP     Umbilical Hernia  Chronic and stable. Recently evaluated by General Surgery; no intervention recommended     Physical deconditioning in context of acute on chronic medical illness  - PT/OT recommending TCU     Patient's overall goals, as he stated, are to stop drinking, stop smoking, and get better.  Will do everything medically to help him achieve that goal.  His family is supportive, but have given up on trying to help him.  He is under commitment with Omayra Angela as his Commitment , phone [phone].  He does not have a guardian and he is his own decision maker.  He wants to start Antabuse to help him avoid alcohol use (he has failed it in the past).  He not not a candidate a good candidate for dialysis if he has progressive renal failure, given his ascites.     At any point he he wants to focus on comfort care, hospice if available to him, but until then we will wean him off narcotics, wean him off nicotine, try to treat his ascites with Spironolactone and minimize paracentesis unless needed for dyspnea.     Gray Vincent MD  Pager: [phone]  Cell Phone:  [phone]       Significant Results and Procedures   As above    Pending Results   These results will be followed up by Dr. Vincent  Unresulted Labs Ordered in the Past 30 Days of this Admission     Date and Time Order Name Status Description    7/9/2021  3:49 PM Blood Culture Preliminary     7/9/2021  3:07 PM Blood Culture Preliminary           Code Status   DNR / DNI       Primary Care Physician   [facility]    Physical Exam   Temp: 98.2  F (36.8  C) Temp src: Oral BP: 124/61 Pulse: 60   Resp: 16 SpO2: 94 % O2 Device: None (Room air)    Vitals:    07/12/21 0342 07/13/21 0700 07/14/21 0601   Weight: 82 kg (180 lb 11.2 oz) 82.1 kg (181 lb) 84.5 kg (186 lb  4.8 oz)     Vital Signs with Ranges  Temp:  [98  F (36.7  C)-98.2  F (36.8  C)] 98.2  F (36.8  C)  Pulse:  [58-64] 60  Resp:  [16] 16  BP: (119-153)/(45-61) 124/61  SpO2:  [94 %-97 %] 94 %  I/O last 3 completed shifts:  In: 600 [P.O.:600]  Out: 1450 [Urine:1450]    Exam on discharge:   Abdomin soft, non-tender, moderate ascites.     Discharge Disposition   Discharged to short-term care facility  Condition at discharge: Guarded    Consultations This Hospital Stay   PHYSICAL THERAPY ADULT IP CONSULT  OCCUPATIONAL THERAPY ADULT IP CONSULT  SOCIAL WORK IP CONSULT  CHEMICAL DEPENDENCY IP CONSULT  CARE MANAGEMENT / SOCIAL WORK IP CONSULT  NEPHROLOGY IP CONSULT  PSYCHIATRY IP CONSULT  SOCIAL WORK IP CONSULT  GASTROENTEROLOGY IP CONSULT  CARE MANAGEMENT / SOCIAL WORK IP CONSULT  PHYSICAL THERAPY ADULT IP CONSULT  OCCUPATIONAL THERAPY ADULT IP CONSULT  INFECTIOUS DISEASES IP CONSULT  VASCULAR ACCESS ADULT IP CONSULT    Time Spent on this Encounter   I spent a total of 35 minutes discharging this patient.     Discharge Orders      General info for SNF    Length of Stay Estimate: Short Term Care: Estimated # of Days <30  Condition at Discharge: Improving  Level of care:skilled   Rehabilitation Potential: Good  Admission H&P remains valid and up-to-date: Yes  Recent Chemotherapy: N/A  Use Nursing Home Standing Orders: Yes     Mantoux instructions    Give two-step Mantoux (PPD) Per Facility Policy Yes     Reason for your hospital stay    Complications of alcoholic cirrhosis     Activity - Up with nursing assistance     Follow Up and recommended labs and tests    Follow up with TCU physician in 3 days, check BMP then, and if Creat stable then increase Spironolactone to 50 mg bid.     IV access    Routine care of Midline catheter, can remove once IV antibiotics completed on 7/21/21.     Additional Discharge Instructions    Call Dr. Burns if any medical questions at Cell Phone [phone].     If any questions about his  Commitment call Omayra Angela at [phone].     No CPR- Do NOT Intubate     Physical Therapy Adult Consult    Evaluate and treat as clinically indicated.    Reason:  Cirrhosis, weakness/deconditioning     Occupational Therapy Adult Consult    Evaluate and treat as clinically indicated.    Reason: cirrhosis, weakness/deconditioning     Advance Diet as Tolerated    Follow this diet upon discharge: 2 gram sodium diet, 1500 mL fluid restriction, Ensure Enlive between meals     Discharge Medications   Discharge Medication List as of 7/14/2021  9:50 AM      START taking these medications    Details   ampicillin (OMNIPEN) 2 g vial Inject 2 g into the vein every 8 hours for 12 days, TransitionalCheck weekly CBC/diff, creatinine, AST -results to Dr. Schmidt's office [facility] No ID follow up needed      disulfiram (ANTABUSE) 250 MG tablet Take 1 tablet (250 mg) by mouth daily, Transitional      finasteride (PROSCAR) 5 MG tablet Take 1 tablet (5 mg) by mouth daily, Transitional      hydrOXYzine (ATARAX) 25 MG tablet Take 1 tablet (25 mg) by mouth every 4 hours as needed for other (adjuvant pain), Transitional      lactulose (CHRONULAC) 10 GM/15ML solution Take 15 mLs (10 g) by mouth 2 times daily, Transitional      miconazole (MICATIN) 2 % external powder Apply topically 2 times dailyTransitional      nicotine (NICODERM CQ) 14 MG/24HR 24 hr patch Place 1 patch onto the skin daily for 7 days, Transitional      nicotine (NICODERM CQ) 7 MG/24HR 24 hr patch Place 1 patch onto the skin every 24 hours for 14 days, R-0, Transitional      ondansetron (ZOFRAN-ODT) 4 MG ODT tab Take 1 tablet (4 mg) by mouth every 6 hours as needed for nausea or vomiting, Transitional      oxyCODONE (ROXICODONE) 5 MG tablet Take 0.5 tablets (2.5 mg) by mouth every 6 hours as needed for moderate to severe pain, Disp-12 tablet, R-0, Local [employer] after 3 days      spironolactone (ALDACTONE) 25 MG tablet Take 1 tablet (25 mg) by mouth 2 times  daily, Transitional      !! traZODone (DESYREL) 100 MG tablet Take 1 tablet (100 mg) by mouth At Bedtime, Transitional       !! - Potential duplicate medications found. Please discuss with provider.      CONTINUE these medications which have CHANGED    Details   albuterol (PROAIR HFA/PROVENTIL HFA/VENTOLIN HFA) 108 (90 Base) MCG/ACT inhaler Inhale 2 puffs into the lungs every 4 hours as needed for shortness of breath / dyspnea or wheezing, TransitionalPharmacy may dispense brand covered by insurance (Proair, or proventil or ventolin or generic albuterol inhaler)      gabapentin (NEURONTIN) 100 MG capsule Take 2 capsules (200 mg) by mouth 3 times daily, Transitional      midodrine (PROAMATINE) 10 MG tablet Take 1 tablet (10 mg) by mouth 3 times daily (with meals), Transitional         CONTINUE these medications which have NOT CHANGED    Details   fluticasone-vilanterol (BREO ELLIPTA) 200-25 MCG/INH inhaler Inhale 1 puff into the lungs daily as needed (SOB), Disp-28 each, R-0, E-Prescribe      mirtazapine (REMERON) 30 MG tablet Take 30 mg by mouth At Bedtime Filled 1/11/21 for #30, Historical      multivitamin w/minerals (THERA-VIT-M) tablet Take 1 tablet by mouth daily, Disp-30 tablet, R-0, E-Prescribe      pantoprazole (PROTONIX) 40 MG EC tablet Take 1 tablet (40 mg) by mouth 2 times daily, Disp-60 tablet, R-0, E-Prescribe      !! QUEtiapine (SEROQUEL) 200 MG tablet Take 200 mg by mouth At Bedtime Filled 1/11/21 #30 , Historical      !! QUEtiapine (SEROQUEL) 50 MG tablet Take 1 tablet (50 mg) by mouth 3 times daily as needed (anxiety) Filled 11/20/20 #30, Disp-90 tablet, R-0, E-Prescribe      tamsulosin (FLOMAX) 0.4 MG capsule Take 2 capsules (0.8 mg) by mouth daily, Disp-30 capsule, R-0, E-Prescribe      !! traZODone (DESYREL) 100 MG tablet Take 100 mg by mouth At Bedtime Filled 1/11/21 #30, Historical      vortioxetine (TRINTELLIX) 10 MG tablet Take 10 mg by mouth 2 times daily, Historical       !! - Potential  duplicate medications found. Please discuss with provider.      STOP taking these medications       eszopiclone (LUNESTA) 3 MG tablet Comments:   Reason for Stopping:         folic acid (FOLVITE) 1 MG tablet Comments:   Reason for Stopping:         magnesium oxide (MAG-OX) 400 (240 Mg) MG tablet Comments:   Reason for Stopping:         nicotine (NICODERM CQ) 21 MG/24HR 24 hr patch Comments:   Reason for Stopping:             Allergies   Allergies   Allergen Reactions     Amlodipine Swelling     Lisinopril      Other reaction(s): Angioedema  Mouth and tongue swelling   Mouth and tongue swelling        Data   Most Recent 3 CBC's:  Recent Labs   Lab Test 07/12/21  0818 07/08/21  1057 07/06/21  0732   WBC 3.9* 2.9* 2.9*   HGB 8.2* 7.2* 8.4*   MCV 94 95 93   * 115* 134*      Most Recent 3 BMP's:  Recent Labs   Lab Test 07/14/21  0558 07/13/21  0724 07/12/21  0818    131* 133   POTASSIUM 4.4 4.2 4.1   CHLORIDE 104 100 102   CO2 24 24 23   BUN 36* 32* 33*   CR 2.75* 2.69* 2.71*   ANIONGAP 6 7 8   KEV 8.5 8.4* 8.6   GLC 81 77 84     Most Recent 2 LFT's:  Recent Labs   Lab Test 07/06/21  0732 07/04/21  0809   AST 21 26   ALT 20 22   ALKPHOS 181* 170*   BILITOTAL 0.5 1.0     Most Recent INR's and Anticoagulation Dosing History:  Anticoagulation Dose History     Recent Dosing and Labs Latest Ref Rng & Units 2/12/2021 3/10/2021 3/13/2021 3/26/2021 5/1/2021 6/6/2021 6/25/2021    INR 0.86 - 1.14 1.34(H) 1.27(H) 1.33(H) 1.24(H) 1.20(H) 1.31(H) 1.24(H)        Most Recent 3 Troponin's:  Recent Labs   Lab Test 03/10/21  2252 03/10/21  0956 02/09/21  0223   TROPI 0.015 <0.015 <0.015     Most Recent Cholesterol Panel:  Recent Labs   Lab Test 08/13/19  0744   CHOL 113   LDL 70   HDL 15*   TRIG 140     Most Recent 6 Bacteria Isolates From Any Culture (See EPIC Reports for Culture Details):  Recent Labs   Lab Test 07/09/21  1710 07/09/21  1522 07/07/21  1442 07/07/21  0257 07/07/21  0249 06/29/21  1355   CULT No growth after  5 days No growth after 5 days No anaerobes isolated  Since this specimen was not transported in the proper anaerobic transport media, the   absence of anaerobes in this culture does not rule out the presence of anaerobes in this   specimen.    No growth Cultured on the 1st day of incubation:  Enterococcus faecalis  *  Critical Value/Significant Value, preliminary result only, called to and read back by  Veronica Gomez RN @ 2152. 7/7/21. AV    Cultured on the 1st day of incubation:  Staphylococcus piscifermentans  Identification obtained by MALDI-TOF mass spectrometry research use only database. Test   characteristics determined and verified by the [facility]   ([facility]) [place], [place].  *  Critical Value/Significant Value, preliminary result only, called to and read back by  Mariya Pierre RN @ 1053.cg 07/09/21    (Note)  POSITIVE for ENTEROCOCCUS FAECALIS and NEGATIVE for Michael/vanB genes  by Verigene multiplex nucleic acid test. Final identification and  antimicrobial susceptibility testing will be verified by standard  methods.    Specimen tested with Verigene multiplex, gram-positive blood culture  nucleic acid test for the following targets: Staph aureus, Staph  epidermidis, Staph lugdunensis, other Staph species, Enterococcus  faecalis, Enterococcus faecium, Streptococcus species, S. agalactiae,  S. anginosus grp., S. pneumoniae, S. pyogenes, Listeria sp., mecA  (methicillin resistance) and Michael/B (vancomycin resistance).    Critical Value/Significant Value called to and read back by Charity Ladd RN  07.08.21 CF   No growth No anaerobes isolated  No growth     Most Recent TSH, T4 and A1c Labs:  Recent Labs   Lab Test 05/01/20  1525 03/22/18  0430   TSH 2.10  --    A1C  --  Canceled, Test credited

## 2021-07-13 NOTE — PLAN OF CARE
Physical Therapy Discharge Summary    Reason for therapy discharge:    Discharged to transitional care facility.    Progress towards therapy goal(s). See goals on Care Plan in [facility] electronic health record for goal details.  Goals partially met.  Barriers to achieving goals:   discharge from facility.    Therapy recommendation(s):    Continued therapy is recommended.  Rationale/Recommendations:  To improve IND with mobility as pt below baseline at this time .

## 2021-07-13 NOTE — PLAN OF CARE
A&Ox4. VSS. Oxycodone given for abdominal pain.  Abd distended.  Midline placed today, saline locked.  Plan for discharge to the [facility] tomorrow at 10am.

## 2021-07-13 NOTE — PLAN OF CARE
A&Ox4. VSS on RA. IV SL. Intermittent anx. 2g NA diet with 1,500 fluid restriction. SBA. Abdomen distended/rounded. C/O pain in site prn oxy and atarax given with relief. +1 BLE edema. Lung sounds clear. Bowel sounds active.

## 2021-07-13 NOTE — PROGRESS NOTES
Care Management Discharge Note    Discharge Date:  7/13/21       Discharge Disposition:  [facility]    Discharge Services:  TCU    Discharge DME:      Discharge Transportation:  Wheelchair Transport    Private pay costs discussed: transportation costs    PAS Confirmation Code:  [id number]   Patient/family educated on Medicare website which has current facility and service quality ratings:      Education Provided on the Discharge Plan:  Yes  Persons Notified of Discharge Plans: Patient,  Omayra  Patient/Family in Agreement with the Plan:  Yes    Handoff Referral Completed: No    Additional Information:  SW received call from [employer] with [facility] and they are able to accept patient today at [facility].  SW placed call to  Transport to arrange for wheelchair transport for 6:16 tonight.      Addendum: SW received call from [employer] with [facility] who stated that patient is not able to come tonight as he will need to be in quarantine and they need to wait for room to be available tomorrow.  SW cancelled ride and rescheduled for 10:00 on 7/14.  Updated patient and he is in agreement with discharge plan.  Updated BRADFORD Cutler.    MIRTA Navarro

## 2021-07-13 NOTE — PROGRESS NOTES
[facility]    Medicine Progress Note - Hospitalist Service       Date of Admission:  6/25/2021    Assessment & Plan            66 year old male with hx of alcoholic cirrhosis with ascites and ongoing alcohol use who was admitted on 6/25/2021 for recurrent ascites. Hospital course complicated by alcohol withdrawal which has now resolved. He is under an active commitment      SBP -- enterococcus on blood culture, presumed from ascites fluid, no actual culture from abd fluid   -- on IV Ampicillin 2 gm q8 hr until 7/21   -- Midline for IV antibiotics at TCU ([facility])    Alcoholic cirrhosis with ascities   -- will try to limit Paracentesis so as to not worsen his NGUYEN, limit to every 7 days or longer   -- start Spironolactone 25 mg daily, increase as tolerated     CKD stage IV w/NGUYEN vs worsening renal function -- at risk for hepatorenal syndrome  Baseline creatinine 2-2.2, presumably due to hepatorenal syndrome. Creatinine up to 2.71 during this hospitalization. Nephrology was consulted, and he was treated with IV fluids and albumin  - Creatinine had plateaued in 2.5-2.8 range   - Continues on PTA midodrine     Hypervolemic hyponatremia. Resolved   Na fabiola 127, resolved with fluid restriction  - On 1500 mL fluid restricution     Pancytopenia due to alcoholic cirrhosis  CBC has been stable with no s/sx of bleeding or infection during this hospitalization  - Follow intermittently      Urinary retention  History of BPH  Coude catheter placed 6/29 for ongoing urinary retention  - Voiding trial today, 7/6  - He was initiated on finasteride during this hospitalization  - Continues on PTA tamsulosin     Acute alcohol withdrawal. Resolved  Alcohol dependence  Acutely intoxicated on arrival. BARNEY 0.34. Developed alcohol withdrawal following admission, and was treated with lorazepam per SHAUN. Patient is apparently under active commitment that was recently renewed for another year.  - CD recommending  TCU with CD services  - daughter who is POA thought he was on a commitment, but he has no appointed guardian yet (left message for PCP to see if he is acting as guardian -- his children do not want to be involved)     Major recurrent depressive disorder with anxiety  * PTA: gabapentin 300 mg TID, mirtazapine 30 mg qhs, vortioxetine 10 mg BID, quetiapine 200 mg qhs and 50 mg TID prn, eszopiclone 3 mg qhs prn sleep]  - Continues on PTA meds, no adjustments per Psych during this admission  - Renally dosing Neurontin      COPD  Chronic and stable on PTA inhalers     Nicotine dependence  Nicotine patch in place, reduce to 14 mg tomorrow     JANIS  Chronic and stable on CPAP     Umbilical Hernia  Chronic and stable. Recently evaluated by General Surgery; no intervention recommended     Physical deconditioning in context of acute on chronic medical illness  - PT/OT recommending TCU         Diet: Fluid restriction 1500 ML FLUID  Snacks/Supplements Pediatric: Ensure Enlive; Between Meals  2 Gram Sodium Diet  Advance Diet as Tolerated    DVT Prophylaxis: Pneumatic Compression Devices  Leger Catheter: Not present  Central Lines: None  Code Status: No CPR- Do NOT Intubate      Disposition Plan   Expected discharge: TCU tomorrow if no new problems      Gray Vincent MD  Hospitalist Service  [facility]  Securely message with the Vocera Web Console (learn more here)  Text page via [employer] Paging/Directory               ______________________________________________________________________    Interval History   Mild abdominal pain, requesting Paracentesis because he feels bloated.     Physical Exam   Vital Signs: Temp: 97.6  F (36.4  C) Temp src: Oral BP: 125/60 (125/60) Pulse: 103   Resp: 16 SpO2: 92 % O2 Device: None (Room air)    Weight: 181 lbs 0 oz  General Appearance: Resting comfortably.  NAD   Respiratory: Clear to auscultation.  No respiratory distress  Cardiovascular: RRR. No obvious  murmurs  GI: Soft.  Moderately distended, minimal tenderness  Skin: No obvious rashes or cyanosis  Extrem: trace edema.  No calf tenderness    Neuro: alert, Ox2.5    Data   Recent Labs   Lab 07/13/21  0724 07/12/21  0818 07/11/21  0628 07/10/21  0714 07/08/21  1057 07/07/21  0827   WBC  --  3.9*  --   --  2.9*  --    HGB  --  8.2*  --   --  7.2*  --    MCV  --  94  --   --  95  --    PLT  --  145*  --   --  115*  --    * 133 132* 135 133 134   POTASSIUM 4.2 4.1 4.3 4.3 3.8 4.0   CHLORIDE 100 102 102 105 103 103   CO2 24 23 23 24 23 26   BUN 32* 33* 32* 32* 33* 32*   CR 2.69* 2.71* 2.74* 2.83* 2.82* 2.84*   ANIONGAP 7 8 7 6 7 5   KEV 8.4* 8.6 8.1* 8.1* 8.1* 8.4*   GLC 77 84 79 81 134* 108*   ALBUMIN  --   --   --  2.8*  --  3.1*     No results found for this or any previous visit (from the past 24 hour(s)).

## 2021-07-13 NOTE — PROCEDURES
[facility]    Single Lumen Midline Placement    Date/Time: 7/13/2021 9:45 AM  Performed by: Tara Connolly RN  Authorized by: Gray Burns MD   Indications: vascular access    UNIVERSAL PROTOCOL   Site Marked: Yes  Prior Images Obtained and Reviewed:  Yes  Required items: Required blood products, implants, devices and special equipment available    Patient identity confirmed:  Verbally with patient and arm band  NA - No sedation, light sedation, or local anesthesia  Confirmation Checklist:  Patient's identity using two indicators, relevant allergies, procedure was appropriate and matched the consent or emergent situation and correct equipment/implants were available  Time out: Immediately prior to the procedure a time out was called    Universal Protocol: the Joint Commission Universal Protocol was followed    Preparation: Patient was prepped and draped in usual sterile fashion    ESBL (mL):  0         ANESTHESIA    Anesthesia: See MAR for details  Local Anesthetic:  Lidocaine 1% without epinephrine  Anesthetic Total (mL):  1      SEDATION    Patient Sedated: No        Preparation: skin prepped with ChloraPrep  Skin prep agent: skin prep agent completely dried prior to procedure  Sterile barriers: maximum sterile barriers were used: cap, mask, sterile gown, sterile gloves, and large sterile sheet  Hand hygiene: hand hygiene performed prior to central venous catheter insertion  Type of line used: Midline  Catheter type: single lumen  Catheter size: 20G, 8cm.  Brand: [employer]  Lot number: [id number]  Placement method: MST and ultrasound  Number of attempts: 1  Successful placement: yes  Orientation: left  Location: basilic vein  Arm circumference: adults 10 cm  Extremity circumference: 25  Visible catheter length: 0  Internal length: 8 cm  Total catheter length: 8  Dressing and securement: securement device, site cleaned, sterile dressing applied and occlusive dressing applied  (gaurdivia disc)  Post procedure assessment: blood return through all ports  PROCEDURE   Patient Tolerance:  Patient tolerated the procedure well with no immediate complications  Describe Procedure: Midline successfully placed in LUE brachial vein. Line is ok for immediate use.

## 2021-07-14 VITALS
HEIGHT: 67 IN | TEMPERATURE: 98.2 F | BODY MASS INDEX: 29.24 KG/M2 | RESPIRATION RATE: 16 BRPM | SYSTOLIC BLOOD PRESSURE: 124 MMHG | DIASTOLIC BLOOD PRESSURE: 61 MMHG | HEART RATE: 60 BPM | WEIGHT: 186.3 LBS | OXYGEN SATURATION: 94 %

## 2021-07-14 LAB
ANION GAP SERPL CALCULATED.3IONS-SCNC: 6 MMOL/L (ref 3–14)
BACTERIA SPEC CULT: NORMAL
BUN SERPL-MCNC: 36 MG/DL (ref 7–30)
CALCIUM SERPL-MCNC: 8.5 MG/DL (ref 8.5–10.1)
CHLORIDE BLD-SCNC: 104 MMOL/L (ref 94–109)
CO2 SERPL-SCNC: 24 MMOL/L (ref 20–32)
CREAT SERPL-MCNC: 2.75 MG/DL (ref 0.66–1.25)
GFR SERPL CREATININE-BSD FRML MDRD: 23 ML/MIN/1.73M2
GLUCOSE BLD-MCNC: 81 MG/DL (ref 70–99)
Lab: NORMAL
POTASSIUM BLD-SCNC: 4.4 MMOL/L (ref 3.4–5.3)
SODIUM SERPL-SCNC: 134 MMOL/L (ref 133–144)
SPECIMEN SOURCE: NORMAL

## 2021-07-14 PROCEDURE — 250N000013 HC RX MED GY IP 250 OP 250 PS 637: Performed by: INTERNAL MEDICINE

## 2021-07-14 PROCEDURE — 99239 HOSP IP/OBS DSCHRG MGMT >30: CPT | Performed by: INTERNAL MEDICINE

## 2021-07-14 PROCEDURE — 80048 BASIC METABOLIC PNL TOTAL CA: CPT | Performed by: INTERNAL MEDICINE

## 2021-07-14 PROCEDURE — 250N000013 HC RX MED GY IP 250 OP 250 PS 637: Performed by: HOSPITALIST

## 2021-07-14 PROCEDURE — 999N000190 HC STATISTIC VAT ROUNDS

## 2021-07-14 PROCEDURE — 250N000011 HC RX IP 250 OP 636: Performed by: SPECIALIST

## 2021-07-14 RX ADMIN — PANTOPRAZOLE SODIUM 40 MG: 40 TABLET, DELAYED RELEASE ORAL at 08:08

## 2021-07-14 RX ADMIN — VORTIOXETINE 10 MG: 10 TABLET, FILM COATED ORAL at 08:09

## 2021-07-14 RX ADMIN — MULTIPLE VITAMINS W/ MINERALS TAB 1 TABLET: TAB at 08:09

## 2021-07-14 RX ADMIN — LACTULOSE 10 G: 10 SOLUTION ORAL at 08:09

## 2021-07-14 RX ADMIN — MICONAZOLE NITRATE: 20 POWDER TOPICAL at 08:13

## 2021-07-14 RX ADMIN — MIDODRINE HYDROCHLORIDE 10 MG: 5 TABLET ORAL at 08:09

## 2021-07-14 RX ADMIN — SPIRONOLACTONE 25 MG: 25 TABLET, FILM COATED ORAL at 08:09

## 2021-07-14 RX ADMIN — GABAPENTIN 200 MG: 100 CAPSULE ORAL at 08:09

## 2021-07-14 RX ADMIN — AMPICILLIN SODIUM 2 G: 2 INJECTION, POWDER, FOR SOLUTION INTRAVENOUS at 05:59

## 2021-07-14 RX ADMIN — QUETIAPINE FUMARATE 50 MG: 25 TABLET ORAL at 08:08

## 2021-07-14 RX ADMIN — FINASTERIDE 5 MG: 5 TABLET, FILM COATED ORAL at 08:08

## 2021-07-14 RX ADMIN — TAMSULOSIN HYDROCHLORIDE 0.8 MG: 0.4 CAPSULE ORAL at 08:08

## 2021-07-14 RX ADMIN — DISULFIRAM 250 MG: 250 TABLET ORAL at 08:08

## 2021-07-14 ASSESSMENT — ACTIVITIES OF DAILY LIVING (ADL)
ADLS_ACUITY_SCORE: 11

## 2021-07-14 ASSESSMENT — MIFFLIN-ST. JEOR: SCORE: 1578.68

## 2021-07-14 NOTE — PLAN OF CARE
A&Ox4. VSS. Pain controlled with oxycodone for abd pain. Abd distended.  Midline saline locked. Plan for discharge to the [facility] tomorrow at 10am.

## 2021-07-14 NOTE — PLAN OF CARE
Patient A&0x4. Up w SBA and walker.  abdomin is distended and firm. Denies pain and nausea. Bowel sounds are active. Urinating well. Lungs diminished. C/o ODE. On EA.  Left foot, ankle and leg +2 edema. Trace edema on R foot and ankle.  Paracentesis sites are CDI, open to air.  Patient discharging to TCU with midline IV in place.

## 2021-07-14 NOTE — PROGRESS NOTES
Care Management Discharge Note    Discharge Date: 07/13/2021       Discharge Disposition:  [facility] [place]    Discharge Services:  TCU    Discharge DME:      Discharge Transportation:  Wheelchair    Private pay costs discussed: transportation costs    PAS Confirmation Code:  [id number]  Patient/family educated on Medicare website which has current facility and service quality ratings:  Yes    Education Provided on the Discharge Plan:  Yes  Persons Notified of Discharge Plans: Patient and  Omayra  Patient/Family in Agreement with the Plan:  Yes    Handoff Referral Completed: No    Additional Information:  Received discharge orders for patient to discharge to facility today.  Wheelchair ride arranged for 10:00 today via  Transport.  Faxed orders to facility.    MIRTA Navarro

## 2021-07-15 ENCOUNTER — HOSPITAL ENCOUNTER (OUTPATIENT)
Facility: CLINIC | Age: 66
Discharge: NURSING, REHAB OR LONG-TERM CARE | End: 2021-07-15
Admitting: RADIOLOGY
Payer: MEDICARE

## 2021-07-15 ENCOUNTER — NURSE TRIAGE (OUTPATIENT)
Dept: NURSING | Facility: CLINIC | Age: 66
End: 2021-07-15

## 2021-07-15 ENCOUNTER — HOSPITAL ENCOUNTER (OUTPATIENT)
Dept: ULTRASOUND IMAGING | Facility: CLINIC | Age: 66
End: 2021-07-15
Attending: FAMILY MEDICINE
Payer: MEDICARE

## 2021-07-15 VITALS
HEART RATE: 69 BPM | TEMPERATURE: 97.6 F | DIASTOLIC BLOOD PRESSURE: 65 MMHG | RESPIRATION RATE: 18 BRPM | SYSTOLIC BLOOD PRESSURE: 139 MMHG

## 2021-07-15 DIAGNOSIS — K70.31 ALCOHOLIC CIRRHOSIS OF LIVER WITH ASCITES (H): Primary | ICD-10-CM

## 2021-07-15 DIAGNOSIS — K70.31 ASCITES DUE TO ALCOHOLIC CIRRHOSIS (H): ICD-10-CM

## 2021-07-15 PROCEDURE — 250N000009 HC RX 250: Performed by: RADIOLOGY

## 2021-07-15 PROCEDURE — 49083 ABD PARACENTESIS W/IMAGING: CPT

## 2021-07-15 PROCEDURE — 999N000154 HC STATISTIC RADIOLOGY XRAY, US, CT, MAR, NM

## 2021-07-15 RX ORDER — LIDOCAINE 40 MG/G
CREAM TOPICAL
Status: DISCONTINUED | OUTPATIENT
Start: 2021-07-15 | End: 2021-07-15 | Stop reason: HOSPADM

## 2021-07-15 RX ORDER — ALBUMIN (HUMAN) 12.5 G/50ML
12.5 SOLUTION INTRAVENOUS ONCE
Status: DISCONTINUED | OUTPATIENT
Start: 2021-07-15 | End: 2021-07-15 | Stop reason: HOSPADM

## 2021-07-15 RX ORDER — DEXTROSE MONOHYDRATE 25 G/50ML
25-50 INJECTION, SOLUTION INTRAVENOUS
Status: DISCONTINUED | OUTPATIENT
Start: 2021-07-15 | End: 2021-07-15 | Stop reason: HOSPADM

## 2021-07-15 RX ORDER — LIDOCAINE HYDROCHLORIDE 10 MG/ML
10 INJECTION, SOLUTION EPIDURAL; INFILTRATION; INTRACAUDAL; PERINEURAL ONCE
Status: COMPLETED | OUTPATIENT
Start: 2021-07-15 | End: 2021-07-15

## 2021-07-15 RX ORDER — ALBUMIN (HUMAN) 12.5 G/50ML
12.5 SOLUTION INTRAVENOUS
Status: CANCELLED | OUTPATIENT
Start: 2021-07-15

## 2021-07-15 RX ORDER — NICOTINE POLACRILEX 4 MG
15-30 LOZENGE BUCCAL
Status: DISCONTINUED | OUTPATIENT
Start: 2021-07-15 | End: 2021-07-15 | Stop reason: HOSPADM

## 2021-07-15 RX ORDER — HEPARIN SODIUM (PORCINE) LOCK FLUSH IV SOLN 100 UNIT/ML 100 UNIT/ML
5 SOLUTION INTRAVENOUS
Status: CANCELLED | OUTPATIENT
Start: 2021-07-15

## 2021-07-15 RX ORDER — ALBUMIN (HUMAN) 12.5 G/50ML
12.5 SOLUTION INTRAVENOUS
Status: DISCONTINUED | OUTPATIENT
Start: 2021-07-15 | End: 2021-07-15 | Stop reason: HOSPADM

## 2021-07-15 RX ORDER — HEPARIN SODIUM,PORCINE 10 UNIT/ML
5 VIAL (ML) INTRAVENOUS
Status: CANCELLED | OUTPATIENT
Start: 2021-07-15

## 2021-07-15 RX ORDER — HEPARIN SODIUM (PORCINE) LOCK FLUSH IV SOLN 100 UNIT/ML 100 UNIT/ML
5 SOLUTION INTRAVENOUS
Status: DISCONTINUED | OUTPATIENT
Start: 2021-07-15 | End: 2021-07-15 | Stop reason: HOSPADM

## 2021-07-15 RX ORDER — HEPARIN SODIUM,PORCINE 10 UNIT/ML
5 VIAL (ML) INTRAVENOUS
Status: DISCONTINUED | OUTPATIENT
Start: 2021-07-15 | End: 2021-07-15 | Stop reason: HOSPADM

## 2021-07-15 RX ADMIN — LIDOCAINE HYDROCHLORIDE 10 ML: 10 INJECTION, SOLUTION EPIDURAL; INFILTRATION; INTRACAUDAL; PERINEURAL at 12:47

## 2021-07-15 NOTE — DISCHARGE INSTRUCTIONS
Paracentesis Discharge Instructions     No changes made this admission    After you go home:      You may resume your normal diet.    Care of Puncture Site:      For the first 48 hrs, check your puncture site every couple hours while you are awake     If there is a bandaid - you may remove it tomorrow morning    You may shower tomorrow    No tub baths, whirlpools or swimming until your puncture site has fully healed    Fluid may leak from the site. Change the bandaid as needed - keep the site dry    If the fluid leaks for more than 48 hours, call your ordering provider     Activity:      You may go back to normal activity in 24 hours     Wait 48 hours before lifting, straining, exercise or other strenuous activity    Medicines:      You may resume all your medications    For minor pain, you may take Acetaminophen (Tylenol) or Ibuprofen (Advil)            Call the provider who ordered this procedure if:      The site is red, swollen, hot or tender    Blood or fluid is draining from the site    Chills or a fever greater than 101 F (38 C)    Pain that is getting worse    Leaking from the site that does not stop    Any questions or concerns      If you have questions call:        [facility] Radiology Dept @ [phone]      The provider who performed your procedure was Dr Jimenes.

## 2021-07-15 NOTE — TELEPHONE ENCOUNTER
Caller is HUC at pt's skilled nursing facility.  Asks for address of pt's paracentesis procedure today.  Provided now per pt's appointment screen.  Caller confirms.  No further questions.    Felecia MITCHELL Health Nurse Advisor     Additional Information    General information question, no triage required and triager able to answer question    Protocols used: INFORMATION ONLY CALL-A-AH

## 2021-07-15 NOTE — PROGRESS NOTES
Patient Wellness Screening  1. In the last month, have you been in contact with someone who was confirmed or suspected to have Coronavirus/COVID-19? No    2. Do you have the following symptoms?  Fever/Chills? No   Cough? No   Shortness of breath? No   New loss of taste or smell? No  Sore throat? No  Muscle or body aches? No  Headaches? No  Fatigue? No  Vomiting or diarrhea? No    Admission Note:  Reason for admission: Paracentesis  Time of arrival:~1235  Accompanied by:self  Name/phone of discontinue :[facility]    Pre-procedure assessment complete: Yes  If abnormal assessment/labs, provider notified: N/A    Medications held per instructions/orders: N/A  Procedure explained. All questions & concerns addressed: Yes  Consent: obtained    Discharge instructions reviewed: Yes  Patient verbalizes understanding: Yes    Paracentesis:   Patient tolerated well. VSS. 5150 cc yellow fluid removed from abdomen w/o difficulty. Bandaid applied to site - CDI.         Discharge Note:  Discharge criteria met and vital signs stable: Yes    1310 Patient discharged per w/c to [facility]. All personal belongings taken with patient.

## 2021-07-15 NOTE — PLAN OF CARE
Occupational Therapy Discharge Summary    Reason for therapy discharge:    Discharged to transitional care facility.    Progress towards therapy goal(s). See goals on Care Plan in [facility] electronic health record for goal details.  Goals partially met.  Barriers to achieving goals:   discharge from facility.    Therapy recommendation(s):    Continued therapy is recommended.  Rationale/Recommendations:  Recommend continued OT at TCU to maximize independence in I/ADLs and functional mobility safety prior to returning home. Pt progressing well, however still below baseline for I/ADL performance.

## 2021-07-19 DIAGNOSIS — Z11.59 ENCOUNTER FOR SCREENING FOR OTHER VIRAL DISEASES: ICD-10-CM

## 2021-07-22 ENCOUNTER — LAB (OUTPATIENT)
Dept: URGENT CARE | Facility: URGENT CARE | Age: 66
End: 2021-07-22
Payer: MEDICARE

## 2021-07-22 DIAGNOSIS — Z11.59 ENCOUNTER FOR SCREENING FOR OTHER VIRAL DISEASES: ICD-10-CM

## 2021-07-22 LAB — SARS-COV-2 RNA RESP QL NAA+PROBE: NEGATIVE

## 2021-07-22 PROCEDURE — U0005 INFEC AGEN DETEC AMPLI PROBE: HCPCS

## 2021-07-22 PROCEDURE — U0003 INFECTIOUS AGENT DETECTION BY NUCLEIC ACID (DNA OR RNA); SEVERE ACUTE RESPIRATORY SYNDROME CORONAVIRUS 2 (SARS-COV-2) (CORONAVIRUS DISEASE [COVID-19]), AMPLIFIED PROBE TECHNIQUE, MAKING USE OF HIGH THROUGHPUT TECHNOLOGIES AS DESCRIBED BY CMS-2020-01-R: HCPCS

## 2021-07-23 ENCOUNTER — TELEPHONE (OUTPATIENT)
Dept: MEDSURG UNIT | Facility: CLINIC | Age: 66
End: 2021-07-23

## 2021-07-23 NOTE — TELEPHONE ENCOUNTER
Pre-Procedure Negative COVID Test Results    Results Reviewed  The patient has a negative COVID test result within the required timeframe for the scheduled procedure.     No COVID pre-call needed.     ABDIRASHID Cook RN

## 2021-07-26 ENCOUNTER — HOSPITAL ENCOUNTER (OUTPATIENT)
Facility: CLINIC | Age: 66
Discharge: HOME | End: 2021-07-26
Admitting: RADIOLOGY
Payer: MEDICARE

## 2021-07-26 ENCOUNTER — HOSPITAL ENCOUNTER (OUTPATIENT)
Dept: ULTRASOUND IMAGING | Facility: CLINIC | Age: 66
End: 2021-07-26
Attending: INTERNAL MEDICINE
Payer: MEDICARE

## 2021-07-26 VITALS — SYSTOLIC BLOOD PRESSURE: 158 MMHG | DIASTOLIC BLOOD PRESSURE: 66 MMHG | HEART RATE: 69 BPM | OXYGEN SATURATION: 98 %

## 2021-07-26 DIAGNOSIS — K72.10 END-STAGE LIVER DISEASE (H): ICD-10-CM

## 2021-07-26 DIAGNOSIS — K70.31 ALCOHOLIC CIRRHOSIS OF LIVER WITH ASCITES (H): Primary | ICD-10-CM

## 2021-07-26 PROCEDURE — 999N000154 HC STATISTIC RADIOLOGY XRAY, US, CT, MAR, NM

## 2021-07-26 PROCEDURE — 49083 ABD PARACENTESIS W/IMAGING: CPT

## 2021-07-26 PROCEDURE — 250N000011 HC RX IP 250 OP 636: Performed by: PHYSICIAN ASSISTANT

## 2021-07-26 PROCEDURE — P9047 ALBUMIN (HUMAN), 25%, 50ML: HCPCS | Performed by: PHYSICIAN ASSISTANT

## 2021-07-26 RX ORDER — HEPARIN SODIUM (PORCINE) LOCK FLUSH IV SOLN 100 UNIT/ML 100 UNIT/ML
5 SOLUTION INTRAVENOUS
Status: DISCONTINUED | OUTPATIENT
Start: 2021-07-26 | End: 2021-07-26 | Stop reason: HOSPADM

## 2021-07-26 RX ORDER — ALBUMIN (HUMAN) 12.5 G/50ML
12.5 SOLUTION INTRAVENOUS
Status: CANCELLED | OUTPATIENT
Start: 2021-07-26

## 2021-07-26 RX ORDER — ALBUMIN (HUMAN) 12.5 G/50ML
12.5 SOLUTION INTRAVENOUS
Status: COMPLETED | OUTPATIENT
Start: 2021-07-26 | End: 2021-07-26

## 2021-07-26 RX ORDER — HEPARIN SODIUM (PORCINE) LOCK FLUSH IV SOLN 100 UNIT/ML 100 UNIT/ML
5 SOLUTION INTRAVENOUS
Status: CANCELLED | OUTPATIENT
Start: 2021-07-26

## 2021-07-26 RX ORDER — HEPARIN SODIUM,PORCINE 10 UNIT/ML
5 VIAL (ML) INTRAVENOUS
Status: CANCELLED | OUTPATIENT
Start: 2021-07-26

## 2021-07-26 RX ORDER — HEPARIN SODIUM,PORCINE 10 UNIT/ML
5 VIAL (ML) INTRAVENOUS
Status: DISCONTINUED | OUTPATIENT
Start: 2021-07-26 | End: 2021-07-26 | Stop reason: HOSPADM

## 2021-07-26 RX ORDER — LIDOCAINE HYDROCHLORIDE 10 MG/ML
10 INJECTION, SOLUTION EPIDURAL; INFILTRATION; INTRACAUDAL; PERINEURAL ONCE
Status: COMPLETED | OUTPATIENT
Start: 2021-07-26 | End: 2021-07-26

## 2021-07-26 RX ADMIN — LIDOCAINE HYDROCHLORIDE 10 ML: 10 INJECTION, SOLUTION EPIDURAL; INFILTRATION; INTRACAUDAL; PERINEURAL at 14:40

## 2021-07-26 RX ADMIN — ALBUMIN HUMAN 12.5 G: 0.25 SOLUTION INTRAVENOUS at 15:00

## 2021-07-26 RX ADMIN — ALBUMIN HUMAN 12.5 G: 0.25 SOLUTION INTRAVENOUS at 15:04

## 2021-07-26 NOTE — PROGRESS NOTES
Paracentesis   8700 mL Straw fluid removed from abdominal space  Patient tolerated procedure well.  Dressing CDI, no swelling or hematoma.    Care Suites Discharge Nursing Note    Patient Information  Name: Jim Richard  Age: 66 year old    Discharge Education:  Discharge instructions reviewed: Yes  Additional education/resources provided: no  Patient/patient representative verbalizes understanding: N/A  Patient discharging on new medications: No  Medication education completed: N/A    Discharge Plans:   Discharge location: home (with home nursing services to [facility])   Discharge ride contacted: Yes  Approximate discharge time: 1530    Discharge Criteria:  Discharge criteria met and vital signs stable: Yes    Patient Belongs:  Patient belongings returned to patient: Yes    Gray Cherry RN

## 2021-07-26 NOTE — DISCHARGE INSTRUCTIONS

## 2021-07-26 NOTE — PROGRESS NOTES
Care Suites Admission Nursing Note    Patient Information  Name: Jim Richard  Age: 66 year old  Reason for admission: paracentesis  Care Suites arrival time: 1400      Patient Admission/Assessment   Pre-procedure assessment complete: Yes  If abnormal assessment/labs, provider notified: No  NPO: N/A  Medications held per instructions/orders: N/A  Consent: obtained  If applicable, pregnancy test status: declined  Patient oriented to room: Yes  Education/questions answered: Yes  Plan/other: proceed    Discharge Planning  Discharge name/phone number: transport  Discharge location: home (with home nursing services to [facility])     Gray Cherry RN

## 2021-07-27 ENCOUNTER — HOSPITAL ENCOUNTER (EMERGENCY)
Facility: CLINIC | Age: 66
Discharge: HOME | End: 2021-07-27
Attending: EMERGENCY MEDICINE | Admitting: EMERGENCY MEDICINE
Payer: MEDICARE

## 2021-07-27 VITALS
DIASTOLIC BLOOD PRESSURE: 82 MMHG | SYSTOLIC BLOOD PRESSURE: 164 MMHG | HEIGHT: 67 IN | HEART RATE: 72 BPM | WEIGHT: 185 LBS | TEMPERATURE: 99.6 F | RESPIRATION RATE: 16 BRPM | BODY MASS INDEX: 29.03 KG/M2 | OXYGEN SATURATION: 98 %

## 2021-07-27 DIAGNOSIS — Z45.2 PICC (PERIPHERALLY INSERTED CENTRAL CATHETER) REMOVAL: ICD-10-CM

## 2021-07-27 PROCEDURE — 999N000040 HC STATISTIC CONSULT NO CHARGE VASC ACCESS

## 2021-07-27 PROCEDURE — 99282 EMERGENCY DEPT VISIT SF MDM: CPT

## 2021-07-27 ASSESSMENT — MIFFLIN-ST. JEOR: SCORE: 1572.78

## 2021-07-27 ASSESSMENT — ENCOUNTER SYMPTOMS: ABDOMINAL PAIN: 0

## 2021-07-27 NOTE — PROGRESS NOTES
Midline removed JESSICA without difficulty--tip intact.  Site covered with vasoline guaze and tagaderm.

## 2021-07-27 NOTE — ED NOTES
Bed: ED17  Expected date:   Expected time:   Means of arrival:   Comments:  [facility] - 433 - 66 M agitated wants picc removed eta [id number]

## 2021-07-27 NOTE — ED PROVIDER NOTES
History   Chief Complaint:  Vascular Access Problem     The history is provided by the patient.      Jim Richard is a 66 year old male with history of SBP, ascites due to alcoholic cirrhosis, alcohol abuse, tremor and with withdarals, hyperlipidemia, hypertension, who presents with vascular access problem. The patient reports that for the past 14 days he has been in a TCU with a PICC line in his left anti cubitus. He reports that he was getting a course of antibiotics for 14 days for his SBP. He notes that he had a paracentesis yesterday with 8.7 L drained and was transfused with a bag of albumin. He reports that he has been in a rehab facility as well. His last dose of antibiotics was yesterday morning and was told his PICC line would be pulled. He reports that this was never pulled and would like this to be taken out as he plans to leave to go home today. He denies any other medical concerns.      Review of Systems   Gastrointestinal: Negative for abdominal pain.   Skin:        Left anti cubital PICC line in place   All other systems reviewed and are negative.        Allergies:  Amlodipine  Lisinopril    Medications:  Albuterol inhaler   Antabuse  Proscar   Gabapentin   Atarax   Midodrine   Remeron   Oxycodone   Zofran   Aldactone   Seroquel   Flomax   Trazodone   Vortioxetine     Past Medical History:    Alcoholism   Depression   Anxiety  COPD  CAD  Heart attack   JANIS on CPAP   Esophageal varices with bleeding  Hepatomegaly   Hyperlipidemia  Hypertension   Peripheral vascular disease  PVD  Subdural hematoma  Spinal stenosis   Substance abuse   End stage alcoholic cirrhosis with ascites  Hepatorenal syndrome   NGUYEN   Thrombocytopenia   Colitis   Overdose with intentional self harm   GI bleed  Withdrawals    Tremor     Past Surgical History:    Appendectomy   Bypass graft femoropopliteal   Colonoscopy x3  Endarterectomy femoral   EGD x7  Hernia repair, abdominal   Laminectomy   Tonsillectomy and  "adenoidectomy   Paracentesis x2    Family History:    Mental illness, son   CAD early onset, mother   Substance abuse, father   Lung cancer, father   Substance abuse, brother     Social History:  Smoking status: current every day smoker  Alcohol use: not currently states he is sober; prior hx of abuse  Drug use: no  PCP: FERNANDA BRANTLEY  Presents to the ED alone    Physical Exam     Patient Vitals for the past 24 hrs:   BP Temp Temp src Pulse Resp SpO2 Height Weight   07/27/21 1400 (!) 164/82 -- -- 72 16 98 % -- --   07/27/21 1301 (!) 159/85 99.6  F (37.6  C) Oral 72 14 97 % 1.702 m (5' 7\") 83.9 kg (185 lb)       Physical Exam  General: Resting comfortably on the gurney  Head:  The scalp, face, and head appear normal  Eyes:  The pupils are equal, round, and reactive to light    There is no nystagmus    Extraocular muscles are intact    Conjunctivae and sclerae are normal  ENT:    The nose is normal    Pinnae are normal    The oropharynx is normal    Uvula is in the midline  Neck:  Normal range of motion    There is no rigidity noted    There is no midline cervical spine pain/tenderness    Trachea is in the midline    No mass is detected  CV:  Regular rate and underlying rhythm     Normal S1/S2, no S3/S4    No pathological murmur detected  Resp:  Lungs are clear    There is no tachypnea    Non-labored    No rales    No wheezing   GI:  Abdomen is soft, there is no rigidity    Distended abdomen from chronic ascites. No gross fluid accumulation, secondary to   paracentesis yesterday.     No tympani    No rebound tenderness     Non-surgical without peritoneal features  MS:  Normal muscular tone    Symmetric motor strength    No major joint effusions    No asymmetric leg swelling, no calf tenderness  Skin:  No rash or acute skin lesions noted. Left anti cubital PICC line, no signs of infection.   Neuro: Speech is normal and fluent  Psych:  Awake. Alert.      Normal affect.  Appropriate interactions.  Lymph: No " anterior cervical lymphadenopathy noted      Emergency Department Course     Emergency Department Course:  Reviewed:  I reviewed the patient's nursing notes, vitals, past medical records, Care Everywhere.     Assessments:  1327 I performed an assessment and examination of the patient as noted above.      1412 I spoke with PICC line nursing and they agree to come down and take the patient' PICC line out. This came out with no problems.     1440 Findings and plan explained to the Patient. Patient discharged home with instructions regarding supportive care, medications, and reasons to return. The importance of close follow-up was reviewed.     Disposition:  The patient was discharged to home.       Impression & Plan   Medical Decision Making:  Jim Richard is a 66 year old male presents requesting PICC line removal on the left.  This is been in for a recent episode of bacterial peritonitis involving the patient's ascites.  He received 2 weeks of intravenous antibiotics and is now requesting removal of the PICC line.  Apparently this was unable to be facilitated at his care center.  He is being discharged and plans on going home.  Our vascular access team remove the PICC line without complication.  No further acute cares are needed.  No abdominal pain at this time.      Covid-19  Jim Richard was evaluated during a global COVID-19 pandemic, which necessitated consideration that the patient might be at risk for infection with the SARS-CoV-2 virus that causes COVID-19.   Applicable protocols for evaluation were followed during the patient's care.     Diagnosis:    ICD-10-CM    1. PICC (peripherally inserted central catheter) removal  Z45.2        Scribe Disclosure:  I, Alena Dhillon, am serving as a scribe at 1:36 PM on 7/27/2021 to document services personally performed by Arden Kline MD based on my observations and the provider's statements to me.      Arden Kline MD  07/27/21 1533

## 2021-07-30 ENCOUNTER — HOSPITAL ENCOUNTER (EMERGENCY)
Facility: CLINIC | Age: 66
Discharge: HOME | End: 2021-07-30
Attending: EMERGENCY MEDICINE | Admitting: EMERGENCY MEDICINE
Payer: MEDICARE

## 2021-07-30 VITALS
HEIGHT: 67 IN | WEIGHT: 185 LBS | HEART RATE: 83 BPM | SYSTOLIC BLOOD PRESSURE: 143 MMHG | BODY MASS INDEX: 29.03 KG/M2 | RESPIRATION RATE: 18 BRPM | TEMPERATURE: 98.4 F | OXYGEN SATURATION: 96 % | DIASTOLIC BLOOD PRESSURE: 73 MMHG

## 2021-07-30 DIAGNOSIS — F10.220 ACUTE ALCOHOLIC INTOXICATION IN ALCOHOLISM WITHOUT COMPLICATION (H): ICD-10-CM

## 2021-07-30 DIAGNOSIS — K70.31 ASCITES DUE TO ALCOHOLIC CIRRHOSIS (H): ICD-10-CM

## 2021-07-30 LAB — ALCOHOL BREATH TEST: 0.16 (ref 0–0.01)

## 2021-07-30 PROCEDURE — 99283 EMERGENCY DEPT VISIT LOW MDM: CPT

## 2021-07-30 ASSESSMENT — ENCOUNTER SYMPTOMS
SHORTNESS OF BREATH: 1
ABDOMINAL DISTENTION: 1

## 2021-07-30 ASSESSMENT — MIFFLIN-ST. JEOR: SCORE: 1572.78

## 2021-07-30 NOTE — ED NOTES
Pt has his light I went in and stated to him his vitals were normal; including BP, pulse and heart rate.  He rolled over and stopped talking to me.

## 2021-07-30 NOTE — DISCHARGE INSTRUCTIONS

## 2021-07-30 NOTE — ED NOTES
Patient has his light on and asked me to  his phone of the floor.  Now he is yelling at a friend on the phone.

## 2021-07-30 NOTE — ED PROVIDER NOTES
History   Chief Complaint:  Alcohol Intoxication       The history is provided by the patient and the EMS personnel.      Jim Richard is a 66 year old male with history of SBP, ascites due to alcoholic cirrhosis, alcohol abuse, tremor and with withdarals, hyperlipidemia, hypertension, who presents with abdominal pain, shortness of breath alcohol intoxication. The patient reports he has consumed a pint of vodka tonight. Earlier in the evening, he initially called EMS because he had fallen out of bed. Upon arrival, EMS helped him back into bed after he denied wanting to go to the ED. Later, he called EMS a second time for the same complaint but refused to be put back in bed thus prompting his arrival to the ED via EMS. While in the ED, he reports that he is having abdominal distension and wants to have a paracentesis performed. Additionally, he proclaims that he is unable to breath. His last paracentesis was on 7/26 and he had 8.7 L drained. He denies hitting his head during either fall. Of note, prior to tonight, the patient's last ED visit was on 7/27 for removal of his PICC line.    Review of Systems   Respiratory: Positive for shortness of breath.    Gastrointestinal: Positive for abdominal distention.   Psychiatric/Behavioral:        Alcohol intoxication   All other systems reviewed and are negative.    Allergies:  Amlodipine  Lisinopril     Medications:  Albuterol inhaler   Antabuse  Proscar   Gabapentin   Atarax   Midodrine   Remeron   Oxycodone   Zofran   Aldactone   Seroquel   Flomax   Trazodone   Vortioxetine      Past Medical History:    Alcoholism   Depression   Anxiety  COPD  CAD  Heart attack   JANIS on CPAP   Esophageal varices with bleeding  Hepatomegaly   Hyperlipidemia  Hypertension   Peripheral vascular disease  PVD  Subdural hematoma  Spinal stenosis   Substance abuse   End stage alcoholic cirrhosis with ascites  Hepatorenal syndrome   NGUYEN   Thrombocytopenia   Colitis   Overdose with  "intentional self harm   GI bleed  Withdrawals    Tremor      Past Surgical History:    Appendectomy   Bypass graft femoropopliteal   Colonoscopy x3  Endarterectomy femoral   EGD x7  Hernia repair, abdominal   Laminectomy   Tonsillectomy and adenoidectomy   Paracentesis x2     Family History:    Mental illness, son   CAD early onset, mother   Substance abuse, father   Lung cancer, father   Substance abuse, brother     Social History:  The patient presents to the ED via EMS. He lives alone.     Physical Exam     Patient Vitals for the past 24 hrs:   BP Temp Temp src Pulse Resp SpO2 Height Weight   07/30/21 0245 -- -- -- -- -- 96 % -- --   07/30/21 0230 -- -- -- -- -- 97 % -- --   07/30/21 0215 -- -- -- -- -- 97 % -- --   07/30/21 0213 (!) 143/73 98.4  F (36.9  C) Temporal 83 18 97 % 1.702 m (5' 7\") 83.9 kg (185 lb)     Physical Exam  General: Intoxicated, appears well-developed and well-nourished. Cooperative.     In no acute distress  HEENT:  Head:  Atraumatic  Ears:  External ears are normal  Mouth/Throat:  Oropharynx is without erythema or exudate and mucous membranes are moist.   Eyes:   Conjunctivae normal and EOM are normal. No scleral icterus.  CV:  Normal rate, regular rhythm, normal heart sounds and radial pulses are 2+ and symmetric.    Resp:  Breath sounds are clear bilaterally    Non-labored, no retractions or accessory muscle use  GI:  Abdomen is soft, distended, + fluid wave.  No tenderness with distraction. No rebound or guarding.  No CVA tenderness bilaterally  MS:  Normal range of motion. No edema.    Normal strength in all 4 extremities.     Back atraumatic.    No midline cervical, thoracic, or lumbar tenderness  Skin:  Warm and dry.  Scattered bruising and abrasions.  Neuro: Intoxicated but alert. Normal strength.  Sensation intact in all 4 extremities. GCS: 15  Psych:  Normal mood and affect.    Emergency Department Course   Laboratory:  Alcohol breath test POCT: 0.16 (!)    Emergency Department " Course:    Reviewed:  I reviewed nursing notes, vitals, past medical history and care everywhere    Assessments:  0225 I obtained history and examined the patient as noted above.     0330 I rechecked the patient and explained findings.     Disposition:  The patient was discharged to home.       Impression & Plan   Medical Decision Making:  This is a 66-year-old male with a complex past medical history pertinent for alcoholic cirrhosis and chronic alcohol abuse who presents with abdominal distention and ascites in the setting of ongoing alcohol abuse.  Patient is intoxicated this evening and had called EMS twice to his house today.  He states he had fallen twice and he does have multiple abrasions to extremities.  He seems alert and oriented to me at bedside and breathalyzer is 0.16 on initial assessment.  Thankfully patient has no traumatic injuries to the head identified on my examination.  He is requesting a paracentesis today, although his abdomen seems soft and compressible without elicited abdominal tenderness.  I do not see significant distention requiring an emergent paracentesis this evening, particularly as I have low concern for infectious etiologies such as spontaneous bacterial peritonitis.  I do feel that the patient is somewhat abusing the emergency medicine system to obtain frequent paracenteses and he knows that he needs to be working with his primary team &  to help set up frequent outpatient paracenteses.  As I do not feel an emergent paracentesis is warranted this evening, patient will be discharged home with plans for close follow-up with his primary team to help coordinate an upcoming outpatient paracentesis.  Patient remained vitally stable under my care.  No indication for x-ray or CT imaging as low concern for sinister traumatic injuries tonight.  We discussed alcohol cessation as well.  After all questions answered and return precautions understood, discharged  home.    Covid-19  Jim Richard was evaluated during a global COVID-19 pandemic, which necessitated consideration that the patient might be at risk for infection with the SARS-CoV-2 virus that causes COVID-19.   Applicable protocols for evaluation were followed during the patient's care.     Diagnosis:    ICD-10-CM    1. Acute alcoholic intoxication in alcoholism without complication (H)  F10.220    2. Ascites due to alcoholic cirrhosis (H)  K70.31        Scribe Disclosure:  I, Zamzam Salguero, am serving as a scribe at 2:43 AM on 7/30/2021 to document services personally performed by Jame Whyte MD based on my observations and the provider's statements to me.     [facility]           Jame Whyte MD  07/30/21 1319

## 2021-07-30 NOTE — ED NOTES
Bed: ED18  Expected date:   Expected time:   Means of arrival:   Comments:  [facility] 429 66M etoh eta 0208

## 2021-07-30 NOTE — ED TRIAGE NOTES
EMS brought in the patient, he called EMS 2x tonight the first time he fell out of bed and they helped him back into bed and then wanted them to leave and then the second time he fell out of bed he wanted to come to the ER.  He usually drinks 1liter of vodka, he has ascites  and was drained on Monday but now he wants to have another drain from his belly because he can't breath.

## 2021-07-30 NOTE — ED NOTES
Pt's phone placed in Pt belongings locker.  While in the room the Pt asked the tech where his call light is.  Tech handed the pt the call light and asked what pt needed.  Pt said that he couldn't breath.  Pt's RR, SpO2, and HR are all WNL.  The nurse and doctor have already been in the room and discussed a plan with the pt.  When Pt was told this, he asked if he could leave.  RN was notified

## 2021-08-03 ENCOUNTER — HOSPITAL ENCOUNTER (INPATIENT)
Facility: CLINIC | Age: 66
LOS: 18 days | Discharge: HOSPICE | DRG: 981 | End: 2021-08-22
Attending: EMERGENCY MEDICINE | Admitting: INTERNAL MEDICINE
Payer: MEDICARE

## 2021-08-03 ENCOUNTER — APPOINTMENT (OUTPATIENT)
Dept: CT IMAGING | Facility: CLINIC | Age: 66
DRG: 981 | End: 2021-08-03
Attending: EMERGENCY MEDICINE
Payer: MEDICARE

## 2021-08-03 DIAGNOSIS — F32.A ANXIETY AND DEPRESSION: ICD-10-CM

## 2021-08-03 DIAGNOSIS — R19.7 DIARRHEA, UNSPECIFIED TYPE: ICD-10-CM

## 2021-08-03 DIAGNOSIS — E16.2 HYPOGLYCEMIA: Primary | ICD-10-CM

## 2021-08-03 DIAGNOSIS — K70.31 ALCOHOLIC CIRRHOSIS OF LIVER WITH ASCITES (H): ICD-10-CM

## 2021-08-03 DIAGNOSIS — R62.7 FAILURE TO THRIVE IN ADULT: ICD-10-CM

## 2021-08-03 DIAGNOSIS — F10.920 ALCOHOLIC INTOXICATION WITHOUT COMPLICATION (H): ICD-10-CM

## 2021-08-03 DIAGNOSIS — F41.9 ANXIETY AND DEPRESSION: ICD-10-CM

## 2021-08-03 DIAGNOSIS — Z51.5 HOSPICE CARE PATIENT: ICD-10-CM

## 2021-08-03 LAB
ALBUMIN SERPL-MCNC: 2.9 G/DL (ref 3.4–5)
ALBUMIN UR-MCNC: 100 MG/DL
ALP SERPL-CCNC: 176 U/L (ref 40–150)
ALT SERPL W P-5'-P-CCNC: 17 U/L (ref 0–70)
ANION GAP SERPL CALCULATED.3IONS-SCNC: 14 MMOL/L (ref 3–14)
APPEARANCE UR: CLEAR
AST SERPL W P-5'-P-CCNC: 43 U/L (ref 0–45)
ATRIAL RATE - MUSE: 81 BPM
BASOPHILS # BLD AUTO: 0.1 10E3/UL (ref 0–0.2)
BASOPHILS NFR BLD AUTO: 1 %
BILIRUB SERPL-MCNC: 1 MG/DL (ref 0.2–1.3)
BILIRUB UR QL STRIP: NEGATIVE
BUN SERPL-MCNC: 43 MG/DL (ref 7–30)
CALCIUM SERPL-MCNC: 8.3 MG/DL (ref 8.5–10.1)
CHLORIDE BLD-SCNC: 111 MMOL/L (ref 94–109)
CO2 SERPL-SCNC: 14 MMOL/L (ref 20–32)
COLOR UR AUTO: ABNORMAL
CREAT SERPL-MCNC: 2.18 MG/DL (ref 0.66–1.25)
DIASTOLIC BLOOD PRESSURE - MUSE: NORMAL MMHG
EOSINOPHIL # BLD AUTO: 0.1 10E3/UL (ref 0–0.7)
EOSINOPHIL NFR BLD AUTO: 1 %
ERYTHROCYTE [DISTWIDTH] IN BLOOD BY AUTOMATED COUNT: 18.5 % (ref 10–15)
ETHANOL SERPL-MCNC: 0.23 G/DL
GFR SERPL CREATININE-BSD FRML MDRD: 30 ML/MIN/1.73M2
GLUCOSE BLD-MCNC: 52 MG/DL (ref 70–99)
GLUCOSE BLDC GLUCOMTR-MCNC: 44 MG/DL (ref 70–99)
GLUCOSE BLDC GLUCOMTR-MCNC: 49 MG/DL (ref 70–99)
GLUCOSE BLDC GLUCOMTR-MCNC: 70 MG/DL (ref 70–99)
GLUCOSE BLDC GLUCOMTR-MCNC: 79 MG/DL (ref 70–99)
GLUCOSE UR STRIP-MCNC: NEGATIVE MG/DL
HCT VFR BLD AUTO: 25.3 % (ref 40–53)
HGB BLD-MCNC: 8.5 G/DL (ref 13.3–17.7)
HGB UR QL STRIP: ABNORMAL
HOLD SPECIMEN: NORMAL
IMM GRANULOCYTES # BLD: 0 10E3/UL
IMM GRANULOCYTES NFR BLD: 0 %
INR PPP: 1.36 (ref 0.85–1.15)
INTERPRETATION ECG - MUSE: NORMAL
KETONES UR STRIP-MCNC: 40 MG/DL
LACTATE SERPL-SCNC: 1.7 MMOL/L (ref 0.7–2)
LEUKOCYTE ESTERASE UR QL STRIP: NEGATIVE
LIPASE SERPL-CCNC: 604 U/L (ref 73–393)
LYMPHOCYTES # BLD AUTO: 0.5 10E3/UL (ref 0.8–5.3)
LYMPHOCYTES NFR BLD AUTO: 9 %
MAGNESIUM SERPL-MCNC: 1.7 MG/DL (ref 1.6–2.3)
MCH RBC QN AUTO: 33.1 PG (ref 26.5–33)
MCHC RBC AUTO-ENTMCNC: 33.6 G/DL (ref 31.5–36.5)
MCV RBC AUTO: 98 FL (ref 78–100)
MONOCYTES # BLD AUTO: 0.4 10E3/UL (ref 0–1.3)
MONOCYTES NFR BLD AUTO: 7 %
MUCOUS THREADS #/AREA URNS LPF: PRESENT /LPF
NEUTROPHILS # BLD AUTO: 4.5 10E3/UL (ref 1.6–8.3)
NEUTROPHILS NFR BLD AUTO: 82 %
NITRATE UR QL: NEGATIVE
NRBC # BLD AUTO: 0 10E3/UL
NRBC BLD AUTO-RTO: 0 /100
P AXIS - MUSE: 53 DEGREES
PH UR STRIP: 5.5 [PH] (ref 5–7)
PLATELET # BLD AUTO: 148 10E3/UL (ref 150–450)
POTASSIUM BLD-SCNC: 4 MMOL/L (ref 3.4–5.3)
PR INTERVAL - MUSE: 152 MS
PROT SERPL-MCNC: 7 G/DL (ref 6.8–8.8)
QRS DURATION - MUSE: 146 MS
QT - MUSE: 470 MS
QTC - MUSE: 545 MS
R AXIS - MUSE: -50 DEGREES
RBC # BLD AUTO: 2.57 10E6/UL (ref 4.4–5.9)
RBC URINE: 1 /HPF
SODIUM SERPL-SCNC: 139 MMOL/L (ref 133–144)
SP GR UR STRIP: 1.01 (ref 1–1.03)
SYSTOLIC BLOOD PRESSURE - MUSE: NORMAL MMHG
T AXIS - MUSE: 38 DEGREES
TROPONIN I SERPL-MCNC: 0.02 UG/L (ref 0–0.04)
UROBILINOGEN UR STRIP-MCNC: NORMAL MG/DL
VENTRICULAR RATE- MUSE: 81 BPM
WBC # BLD AUTO: 5.5 10E3/UL (ref 4–11)
WBC URINE: 1 /HPF

## 2021-08-03 PROCEDURE — 81001 URINALYSIS AUTO W/SCOPE: CPT | Performed by: EMERGENCY MEDICINE

## 2021-08-03 PROCEDURE — C9803 HOPD COVID-19 SPEC COLLECT: HCPCS

## 2021-08-03 PROCEDURE — 258N000001 HC RX 258: Performed by: EMERGENCY MEDICINE

## 2021-08-03 PROCEDURE — 96374 THER/PROPH/DIAG INJ IV PUSH: CPT

## 2021-08-03 PROCEDURE — 83605 ASSAY OF LACTIC ACID: CPT | Performed by: EMERGENCY MEDICINE

## 2021-08-03 PROCEDURE — 85025 COMPLETE CBC W/AUTO DIFF WBC: CPT | Performed by: EMERGENCY MEDICINE

## 2021-08-03 PROCEDURE — 99285 EMERGENCY DEPT VISIT HI MDM: CPT | Mod: 25

## 2021-08-03 PROCEDURE — G0378 HOSPITAL OBSERVATION PER HR: HCPCS

## 2021-08-03 PROCEDURE — 70450 CT HEAD/BRAIN W/O DYE: CPT | Mod: MG

## 2021-08-03 PROCEDURE — 84484 ASSAY OF TROPONIN QUANT: CPT | Performed by: EMERGENCY MEDICINE

## 2021-08-03 PROCEDURE — 36415 COLL VENOUS BLD VENIPUNCTURE: CPT | Performed by: EMERGENCY MEDICINE

## 2021-08-03 PROCEDURE — 250N000011 HC RX IP 250 OP 636

## 2021-08-03 PROCEDURE — 85610 PROTHROMBIN TIME: CPT | Performed by: EMERGENCY MEDICINE

## 2021-08-03 PROCEDURE — 99220 PR INITIAL OBSERVATION CARE,LEVEL III: CPT | Performed by: INTERNAL MEDICINE

## 2021-08-03 PROCEDURE — 83735 ASSAY OF MAGNESIUM: CPT | Performed by: EMERGENCY MEDICINE

## 2021-08-03 PROCEDURE — 87635 SARS-COV-2 COVID-19 AMP PRB: CPT | Performed by: EMERGENCY MEDICINE

## 2021-08-03 PROCEDURE — 82077 ASSAY SPEC XCP UR&BREATH IA: CPT | Performed by: EMERGENCY MEDICINE

## 2021-08-03 PROCEDURE — 93005 ELECTROCARDIOGRAM TRACING: CPT

## 2021-08-03 PROCEDURE — 82040 ASSAY OF SERUM ALBUMIN: CPT | Performed by: EMERGENCY MEDICINE

## 2021-08-03 PROCEDURE — 72131 CT LUMBAR SPINE W/O DYE: CPT | Mod: MG

## 2021-08-03 PROCEDURE — 83690 ASSAY OF LIPASE: CPT | Performed by: EMERGENCY MEDICINE

## 2021-08-03 RX ORDER — DEXTROSE MONOHYDRATE 25 G/50ML
50 INJECTION, SOLUTION INTRAVENOUS ONCE
Status: COMPLETED | OUTPATIENT
Start: 2021-08-03 | End: 2021-08-03

## 2021-08-03 RX ORDER — DEXTROSE MONOHYDRATE 25 G/50ML
50 INJECTION, SOLUTION INTRAVENOUS ONCE
Status: COMPLETED | OUTPATIENT
Start: 2021-08-03 | End: 2021-08-04

## 2021-08-03 RX ORDER — DEXTROSE MONOHYDRATE 25 G/50ML
25 INJECTION, SOLUTION INTRAVENOUS ONCE
Status: COMPLETED | OUTPATIENT
Start: 2021-08-03 | End: 2021-08-03

## 2021-08-03 RX ORDER — ONDANSETRON 2 MG/ML
4 INJECTION INTRAMUSCULAR; INTRAVENOUS ONCE
Status: COMPLETED | OUTPATIENT
Start: 2021-08-03 | End: 2021-08-03

## 2021-08-03 RX ORDER — ONDANSETRON 2 MG/ML
INJECTION INTRAMUSCULAR; INTRAVENOUS
Status: COMPLETED
Start: 2021-08-03 | End: 2021-08-03

## 2021-08-03 RX ADMIN — DEXTROSE MONOHYDRATE 25 ML: 500 INJECTION PARENTERAL at 21:09

## 2021-08-03 RX ADMIN — DEXTROSE MONOHYDRATE 25 ML: 500 INJECTION PARENTERAL at 20:21

## 2021-08-03 RX ADMIN — ONDANSETRON 4 MG: 2 INJECTION INTRAMUSCULAR; INTRAVENOUS at 22:21

## 2021-08-03 ASSESSMENT — ENCOUNTER SYMPTOMS
DIARRHEA: 1
RHINORRHEA: 0
ABDOMINAL PAIN: 1
ABDOMINAL DISTENTION: 1
COUGH: 0
VOMITING: 0

## 2021-08-03 ASSESSMENT — MIFFLIN-ST. JEOR: SCORE: 1595.46

## 2021-08-04 ENCOUNTER — APPOINTMENT (OUTPATIENT)
Dept: ULTRASOUND IMAGING | Facility: CLINIC | Age: 66
DRG: 981 | End: 2021-08-04
Attending: INTERNAL MEDICINE
Payer: MEDICARE

## 2021-08-04 LAB
% LINING CELLS, BODY FLUID: 36 %
ABO/RH(D): NORMAL
ALBUMIN FLD-MCNC: 0.5 G/DL
ALBUMIN SERPL-MCNC: 2.8 G/DL (ref 3.4–5)
ANION GAP SERPL CALCULATED.3IONS-SCNC: 6 MMOL/L (ref 3–14)
ANTIBODY SCREEN: NEGATIVE
APPEARANCE FLD: CLEAR
BLD PROD TYP BPU: NORMAL
BLOOD COMPONENT TYPE: NORMAL
BUN SERPL-MCNC: 41 MG/DL (ref 7–30)
C DIFF TOX B STL QL: POSITIVE
CALCIUM SERPL-MCNC: 8.4 MG/DL (ref 8.5–10.1)
CHLORIDE BLD-SCNC: 109 MMOL/L (ref 94–109)
CO2 SERPL-SCNC: 18 MMOL/L (ref 20–32)
CODING SYSTEM: NORMAL
COLOR FLD: YELLOW
CREAT SERPL-MCNC: 2.27 MG/DL (ref 0.66–1.25)
CROSSMATCH: NORMAL
ERYTHROCYTE [DISTWIDTH] IN BLOOD BY AUTOMATED COUNT: 17.6 % (ref 10–15)
GFR SERPL CREATININE-BSD FRML MDRD: 29 ML/MIN/1.73M2
GLUCOSE BLD-MCNC: 127 MG/DL (ref 70–99)
GLUCOSE BLDC GLUCOMTR-MCNC: 120 MG/DL (ref 70–99)
GLUCOSE BLDC GLUCOMTR-MCNC: 129 MG/DL (ref 70–99)
GLUCOSE BLDC GLUCOMTR-MCNC: 136 MG/DL (ref 70–99)
GLUCOSE BLDC GLUCOMTR-MCNC: 86 MG/DL (ref 70–99)
GLUCOSE BLDC GLUCOMTR-MCNC: 99 MG/DL (ref 70–99)
GLUCOSE FLD-MCNC: 134 MG/DL
GRAM STAIN RESULT: NORMAL
GRAM STAIN RESULT: NORMAL
HCT VFR BLD AUTO: 22.3 % (ref 40–53)
HGB BLD-MCNC: 6.9 G/DL (ref 13.3–17.7)
HGB BLD-MCNC: 7.5 G/DL (ref 13.3–17.7)
ISSUE DATE AND TIME: NORMAL
LYMPHOCYTES NFR FLD MANUAL: 3 %
MAGNESIUM SERPL-MCNC: 1.6 MG/DL (ref 1.6–2.3)
MCH RBC QN AUTO: 32.2 PG (ref 26.5–33)
MCHC RBC AUTO-ENTMCNC: 33.6 G/DL (ref 31.5–36.5)
MCV RBC AUTO: 96 FL (ref 78–100)
MONOS+MACROS NFR FLD MANUAL: 55 %
NEUTS BAND NFR FLD MANUAL: 6 %
PHOSPHATE SERPL-MCNC: 2.8 MG/DL (ref 2.5–4.5)
PLATELET # BLD AUTO: 109 10E3/UL (ref 150–450)
POTASSIUM BLD-SCNC: 4 MMOL/L (ref 3.4–5.3)
PROT FLD-MCNC: 1.3 G/DL
RBC # BLD AUTO: 2.33 10E6/UL (ref 4.4–5.9)
SARS-COV-2 RNA RESP QL NAA+PROBE: NEGATIVE
SODIUM SERPL-SCNC: 133 MMOL/L (ref 133–144)
SPECIMEN EXPIRATION DATE: NORMAL
TROPONIN I SERPL-MCNC: <0.015 UG/L (ref 0–0.04)
UNIT ABO/RH: NORMAL
UNIT NUMBER: NORMAL
UNIT STATUS: NORMAL
UNIT TYPE ISBT: 600
WBC # BLD AUTO: 4.5 10E3/UL (ref 4–11)
WBC # FLD AUTO: 201 /UL

## 2021-08-04 PROCEDURE — 87205 SMEAR GRAM STAIN: CPT | Performed by: INTERNAL MEDICINE

## 2021-08-04 PROCEDURE — 258N000003 HC RX IP 258 OP 636: Performed by: INTERNAL MEDICINE

## 2021-08-04 PROCEDURE — 89051 BODY FLUID CELL COUNT: CPT | Performed by: INTERNAL MEDICINE

## 2021-08-04 PROCEDURE — 87015 SPECIMEN INFECT AGNT CONCNTJ: CPT | Performed by: INTERNAL MEDICINE

## 2021-08-04 PROCEDURE — 85027 COMPLETE CBC AUTOMATED: CPT | Performed by: INTERNAL MEDICINE

## 2021-08-04 PROCEDURE — 84100 ASSAY OF PHOSPHORUS: CPT | Performed by: INTERNAL MEDICINE

## 2021-08-04 PROCEDURE — 86923 COMPATIBILITY TEST ELECTRIC: CPT | Performed by: INTERNAL MEDICINE

## 2021-08-04 PROCEDURE — 36415 COLL VENOUS BLD VENIPUNCTURE: CPT | Performed by: INTERNAL MEDICINE

## 2021-08-04 PROCEDURE — 258N000001 HC RX 258: Performed by: EMERGENCY MEDICINE

## 2021-08-04 PROCEDURE — P9016 RBC LEUKOCYTES REDUCED: HCPCS | Performed by: INTERNAL MEDICINE

## 2021-08-04 PROCEDURE — 250N000013 HC RX MED GY IP 250 OP 250 PS 637: Performed by: INTERNAL MEDICINE

## 2021-08-04 PROCEDURE — G0378 HOSPITAL OBSERVATION PER HR: HCPCS

## 2021-08-04 PROCEDURE — 84484 ASSAY OF TROPONIN QUANT: CPT | Performed by: INTERNAL MEDICINE

## 2021-08-04 PROCEDURE — 86901 BLOOD TYPING SEROLOGIC RH(D): CPT | Performed by: INTERNAL MEDICINE

## 2021-08-04 PROCEDURE — 84157 ASSAY OF PROTEIN OTHER: CPT | Performed by: INTERNAL MEDICINE

## 2021-08-04 PROCEDURE — 99233 SBSQ HOSP IP/OBS HIGH 50: CPT | Performed by: INTERNAL MEDICINE

## 2021-08-04 PROCEDURE — C9113 INJ PANTOPRAZOLE SODIUM, VIA: HCPCS | Performed by: INTERNAL MEDICINE

## 2021-08-04 PROCEDURE — P9047 ALBUMIN (HUMAN), 25%, 50ML: HCPCS | Performed by: NURSE PRACTITIONER

## 2021-08-04 PROCEDURE — 250N000011 HC RX IP 250 OP 636

## 2021-08-04 PROCEDURE — 80069 RENAL FUNCTION PANEL: CPT | Performed by: INTERNAL MEDICINE

## 2021-08-04 PROCEDURE — 87493 C DIFF AMPLIFIED PROBE: CPT | Performed by: EMERGENCY MEDICINE

## 2021-08-04 PROCEDURE — 250N000011 HC RX IP 250 OP 636: Performed by: INTERNAL MEDICINE

## 2021-08-04 PROCEDURE — 210N000002 HC R&B HEART CARE

## 2021-08-04 PROCEDURE — 82945 GLUCOSE OTHER FLUID: CPT | Performed by: INTERNAL MEDICINE

## 2021-08-04 PROCEDURE — 250N000011 HC RX IP 250 OP 636: Performed by: NURSE PRACTITIONER

## 2021-08-04 PROCEDURE — 250N000013 HC RX MED GY IP 250 OP 250 PS 637: Performed by: EMERGENCY MEDICINE

## 2021-08-04 PROCEDURE — 99207 PR NO CHARGE LOS: CPT | Performed by: INTERNAL MEDICINE

## 2021-08-04 PROCEDURE — 0W9G3ZZ DRAINAGE OF PERITONEAL CAVITY, PERCUTANEOUS APPROACH: ICD-10-PCS | Performed by: RADIOLOGY

## 2021-08-04 PROCEDURE — 82042 OTHER SOURCE ALBUMIN QUAN EA: CPT | Performed by: INTERNAL MEDICINE

## 2021-08-04 PROCEDURE — 85018 HEMOGLOBIN: CPT | Performed by: INTERNAL MEDICINE

## 2021-08-04 PROCEDURE — 83735 ASSAY OF MAGNESIUM: CPT | Performed by: INTERNAL MEDICINE

## 2021-08-04 PROCEDURE — 272N000706 US PARACENTESIS

## 2021-08-04 RX ORDER — TAMSULOSIN HYDROCHLORIDE 0.4 MG/1
0.8 CAPSULE ORAL DAILY
Status: DISCONTINUED | OUTPATIENT
Start: 2021-08-04 | End: 2021-08-19

## 2021-08-04 RX ORDER — MIDODRINE HYDROCHLORIDE 5 MG/1
10 TABLET ORAL
Status: DISCONTINUED | OUTPATIENT
Start: 2021-08-04 | End: 2021-08-15

## 2021-08-04 RX ORDER — HYDROXYZINE HYDROCHLORIDE 25 MG/1
25 TABLET, FILM COATED ORAL EVERY 4 HOURS PRN
Status: DISCONTINUED | OUTPATIENT
Start: 2021-08-04 | End: 2021-08-04

## 2021-08-04 RX ORDER — NICOTINE POLACRILEX 4 MG
15-30 LOZENGE BUCCAL
Status: DISCONTINUED | OUTPATIENT
Start: 2021-08-04 | End: 2021-08-22 | Stop reason: HOSPADM

## 2021-08-04 RX ORDER — LIDOCAINE HYDROCHLORIDE 10 MG/ML
10 INJECTION, SOLUTION EPIDURAL; INFILTRATION; INTRACAUDAL; PERINEURAL ONCE
Status: COMPLETED | OUTPATIENT
Start: 2021-08-04 | End: 2021-08-04

## 2021-08-04 RX ORDER — HYDROXYZINE HYDROCHLORIDE 50 MG/1
50 TABLET, FILM COATED ORAL 3 TIMES DAILY PRN
Status: ON HOLD | COMMUNITY
End: 2021-08-22

## 2021-08-04 RX ORDER — ACETAMINOPHEN 325 MG/1
650 TABLET ORAL EVERY 6 HOURS PRN
Status: DISCONTINUED | OUTPATIENT
Start: 2021-08-04 | End: 2021-08-22 | Stop reason: HOSPADM

## 2021-08-04 RX ORDER — LANOLIN ALCOHOL/MO/W.PET/CERES
100 CREAM (GRAM) TOPICAL DAILY
Status: COMPLETED | OUTPATIENT
Start: 2021-08-04 | End: 2021-08-08

## 2021-08-04 RX ORDER — NALOXONE HYDROCHLORIDE 0.4 MG/ML
0.2 INJECTION, SOLUTION INTRAMUSCULAR; INTRAVENOUS; SUBCUTANEOUS
Status: DISCONTINUED | OUTPATIENT
Start: 2021-08-04 | End: 2021-08-22 | Stop reason: HOSPADM

## 2021-08-04 RX ORDER — TRAZODONE HYDROCHLORIDE 100 MG/1
100 TABLET ORAL ONCE
Status: COMPLETED | OUTPATIENT
Start: 2021-08-04 | End: 2021-08-04

## 2021-08-04 RX ORDER — OXYCODONE HYDROCHLORIDE 5 MG/1
5 TABLET ORAL EVERY 6 HOURS PRN
Status: DISCONTINUED | OUTPATIENT
Start: 2021-08-04 | End: 2021-08-11

## 2021-08-04 RX ORDER — ONDANSETRON 2 MG/ML
INJECTION INTRAMUSCULAR; INTRAVENOUS
Status: COMPLETED
Start: 2021-08-04 | End: 2021-08-04

## 2021-08-04 RX ORDER — ONDANSETRON 2 MG/ML
4 INJECTION INTRAMUSCULAR; INTRAVENOUS EVERY 6 HOURS PRN
Status: DISCONTINUED | OUTPATIENT
Start: 2021-08-04 | End: 2021-08-22 | Stop reason: HOSPADM

## 2021-08-04 RX ORDER — DEXTROSE MONOHYDRATE 25 G/50ML
25-50 INJECTION, SOLUTION INTRAVENOUS
Status: DISCONTINUED | OUTPATIENT
Start: 2021-08-04 | End: 2021-08-22 | Stop reason: HOSPADM

## 2021-08-04 RX ORDER — FLUMAZENIL 0.1 MG/ML
0.2 INJECTION, SOLUTION INTRAVENOUS
Status: DISCONTINUED | OUTPATIENT
Start: 2021-08-04 | End: 2021-08-22 | Stop reason: HOSPADM

## 2021-08-04 RX ORDER — LIDOCAINE 40 MG/G
CREAM TOPICAL
Status: DISCONTINUED | OUTPATIENT
Start: 2021-08-04 | End: 2021-08-04

## 2021-08-04 RX ORDER — LIDOCAINE 40 MG/G
CREAM TOPICAL
Status: DISCONTINUED | OUTPATIENT
Start: 2021-08-04 | End: 2021-08-22 | Stop reason: HOSPADM

## 2021-08-04 RX ORDER — ACETAMINOPHEN 650 MG/1
650 SUPPOSITORY RECTAL EVERY 6 HOURS PRN
Status: DISCONTINUED | OUTPATIENT
Start: 2021-08-04 | End: 2021-08-22 | Stop reason: HOSPADM

## 2021-08-04 RX ORDER — PANTOPRAZOLE SODIUM 40 MG/1
40 TABLET, DELAYED RELEASE ORAL 2 TIMES DAILY
Status: DISCONTINUED | OUTPATIENT
Start: 2021-08-04 | End: 2021-08-07

## 2021-08-04 RX ORDER — QUETIAPINE FUMARATE 50 MG/1
50 TABLET, FILM COATED ORAL 3 TIMES DAILY PRN
Status: DISCONTINUED | OUTPATIENT
Start: 2021-08-04 | End: 2021-08-19

## 2021-08-04 RX ORDER — NALOXONE HYDROCHLORIDE 0.4 MG/ML
0.4 INJECTION, SOLUTION INTRAMUSCULAR; INTRAVENOUS; SUBCUTANEOUS
Status: DISCONTINUED | OUTPATIENT
Start: 2021-08-04 | End: 2021-08-22 | Stop reason: HOSPADM

## 2021-08-04 RX ORDER — FINASTERIDE 5 MG/1
5 TABLET, FILM COATED ORAL DAILY
Status: DISCONTINUED | OUTPATIENT
Start: 2021-08-04 | End: 2021-08-19

## 2021-08-04 RX ORDER — DISULFIRAM 250 MG/1
250 TABLET ORAL DAILY
Status: DISCONTINUED | OUTPATIENT
Start: 2021-08-04 | End: 2021-08-12

## 2021-08-04 RX ORDER — QUETIAPINE FUMARATE 100 MG/1
200 TABLET, FILM COATED ORAL AT BEDTIME
Status: DISCONTINUED | OUTPATIENT
Start: 2021-08-04 | End: 2021-08-17

## 2021-08-04 RX ORDER — NICOTINE 21 MG/24HR
1 PATCH, TRANSDERMAL 24 HOURS TRANSDERMAL DAILY
Status: DISCONTINUED | OUTPATIENT
Start: 2021-08-04 | End: 2021-08-22 | Stop reason: HOSPADM

## 2021-08-04 RX ORDER — GABAPENTIN 100 MG/1
200 CAPSULE ORAL 3 TIMES DAILY
Status: DISCONTINUED | OUTPATIENT
Start: 2021-08-04 | End: 2021-08-17

## 2021-08-04 RX ORDER — HYDROXYZINE HYDROCHLORIDE 25 MG/1
50 TABLET, FILM COATED ORAL 3 TIMES DAILY PRN
Status: DISCONTINUED | OUTPATIENT
Start: 2021-08-04 | End: 2021-08-19

## 2021-08-04 RX ORDER — FOLIC ACID 1 MG/1
1 TABLET ORAL DAILY
Status: DISCONTINUED | OUTPATIENT
Start: 2021-08-04 | End: 2021-08-15

## 2021-08-04 RX ORDER — VANCOMYCIN HYDROCHLORIDE 125 MG/1
125 CAPSULE ORAL 4 TIMES DAILY
Status: DISCONTINUED | OUTPATIENT
Start: 2021-08-04 | End: 2021-08-14

## 2021-08-04 RX ORDER — ALBUTEROL SULFATE 90 UG/1
2 AEROSOL, METERED RESPIRATORY (INHALATION) EVERY 4 HOURS PRN
Status: DISCONTINUED | OUTPATIENT
Start: 2021-08-04 | End: 2021-08-22 | Stop reason: HOSPADM

## 2021-08-04 RX ORDER — ALBUMIN (HUMAN) 12.5 G/50ML
12.5 SOLUTION INTRAVENOUS ONCE
Status: COMPLETED | OUTPATIENT
Start: 2021-08-04 | End: 2021-08-04

## 2021-08-04 RX ORDER — OCTREOTIDE ACETATE 50 UG/ML
50 INJECTION, SOLUTION INTRAVENOUS; SUBCUTANEOUS ONCE
Status: COMPLETED | OUTPATIENT
Start: 2021-08-04 | End: 2021-08-04

## 2021-08-04 RX ORDER — MULTIPLE VITAMINS W/ MINERALS TAB 9MG-400MCG
1 TAB ORAL DAILY
Status: DISCONTINUED | OUTPATIENT
Start: 2021-08-04 | End: 2021-08-12

## 2021-08-04 RX ORDER — MIRTAZAPINE 15 MG/1
30 TABLET, FILM COATED ORAL AT BEDTIME
Status: DISCONTINUED | OUTPATIENT
Start: 2021-08-04 | End: 2021-08-19

## 2021-08-04 RX ORDER — TRAZODONE HYDROCHLORIDE 100 MG/1
100 TABLET ORAL AT BEDTIME
Status: DISCONTINUED | OUTPATIENT
Start: 2021-08-04 | End: 2021-08-17

## 2021-08-04 RX ORDER — ONDANSETRON 4 MG/1
4 TABLET, ORALLY DISINTEGRATING ORAL EVERY 6 HOURS PRN
Status: DISCONTINUED | OUTPATIENT
Start: 2021-08-04 | End: 2021-08-22 | Stop reason: HOSPADM

## 2021-08-04 RX ADMIN — Medication 1 MG: at 02:41

## 2021-08-04 RX ADMIN — THIAMINE HCL TAB 100 MG 100 MG: 100 TAB at 09:56

## 2021-08-04 RX ADMIN — QUETIAPINE FUMARATE 50 MG: 50 TABLET ORAL at 15:06

## 2021-08-04 RX ADMIN — GABAPENTIN 200 MG: 100 CAPSULE ORAL at 09:54

## 2021-08-04 RX ADMIN — ONDANSETRON 4 MG: 2 INJECTION INTRAMUSCULAR; INTRAVENOUS at 02:39

## 2021-08-04 RX ADMIN — ALBUMIN HUMAN 12.5 G: 0.25 SOLUTION INTRAVENOUS at 17:54

## 2021-08-04 RX ADMIN — FINASTERIDE 5 MG: 5 TABLET, FILM COATED ORAL at 09:56

## 2021-08-04 RX ADMIN — LIDOCAINE HYDROCHLORIDE 10 ML: 10 INJECTION, SOLUTION EPIDURAL; INFILTRATION; INTRACAUDAL; PERINEURAL at 15:37

## 2021-08-04 RX ADMIN — FOLIC ACID 1 MG: 1 TABLET ORAL at 09:56

## 2021-08-04 RX ADMIN — ONDANSETRON 4 MG: 4 TABLET, ORALLY DISINTEGRATING ORAL at 09:56

## 2021-08-04 RX ADMIN — MIRTAZAPINE 30 MG: 15 TABLET, FILM COATED ORAL at 22:19

## 2021-08-04 RX ADMIN — VANCOMYCIN HYDROCHLORIDE 125 MG: 125 CAPSULE ORAL at 12:16

## 2021-08-04 RX ADMIN — OCTREOTIDE ACETATE 50 MCG/HR: 200 INJECTION, SOLUTION INTRAVENOUS; SUBCUTANEOUS at 21:42

## 2021-08-04 RX ADMIN — PANTOPRAZOLE SODIUM 40 MG: 40 INJECTION, POWDER, FOR SOLUTION INTRAVENOUS at 22:19

## 2021-08-04 RX ADMIN — TRAZODONE HYDROCHLORIDE 100 MG: 100 TABLET ORAL at 02:40

## 2021-08-04 RX ADMIN — GABAPENTIN 200 MG: 100 CAPSULE ORAL at 21:18

## 2021-08-04 RX ADMIN — GABAPENTIN 200 MG: 100 CAPSULE ORAL at 13:48

## 2021-08-04 RX ADMIN — TRAZODONE HYDROCHLORIDE 100 MG: 100 TABLET ORAL at 22:19

## 2021-08-04 RX ADMIN — OXYCODONE HYDROCHLORIDE 5 MG: 5 TABLET ORAL at 22:19

## 2021-08-04 RX ADMIN — VANCOMYCIN HYDROCHLORIDE 125 MG: 125 CAPSULE ORAL at 21:18

## 2021-08-04 RX ADMIN — VORTIOXETINE 10 MG: 10 TABLET, FILM COATED ORAL at 21:18

## 2021-08-04 RX ADMIN — OXYCODONE HYDROCHLORIDE 2.5 MG: 5 TABLET ORAL at 09:54

## 2021-08-04 RX ADMIN — ONDANSETRON 4 MG: 2 INJECTION INTRAMUSCULAR; INTRAVENOUS at 06:53

## 2021-08-04 RX ADMIN — MULTIPLE VITAMINS W/ MINERALS TAB 1 TABLET: TAB at 09:56

## 2021-08-04 RX ADMIN — QUETIAPINE FUMARATE 200 MG: 200 TABLET ORAL at 23:14

## 2021-08-04 RX ADMIN — PANTOPRAZOLE SODIUM 40 MG: 40 TABLET, DELAYED RELEASE ORAL at 09:54

## 2021-08-04 RX ADMIN — TAMSULOSIN HYDROCHLORIDE 0.8 MG: 0.4 CAPSULE ORAL at 09:56

## 2021-08-04 RX ADMIN — HYDROXYZINE HYDROCHLORIDE 25 MG: 25 TABLET ORAL at 12:16

## 2021-08-04 RX ADMIN — OXYCODONE HYDROCHLORIDE 2.5 MG: 5 TABLET ORAL at 02:44

## 2021-08-04 RX ADMIN — NICOTINE 1 PATCH: 14 PATCH, EXTENDED RELEASE TRANSDERMAL at 09:53

## 2021-08-04 RX ADMIN — DEXTROSE MONOHYDRATE 50 ML: 500 INJECTION PARENTERAL at 00:00

## 2021-08-04 RX ADMIN — OCTREOTIDE ACETATE 50 MCG: 50 INJECTION, SOLUTION INTRAVENOUS; SUBCUTANEOUS at 21:42

## 2021-08-04 RX ADMIN — OXYCODONE HYDROCHLORIDE 2.5 MG: 5 TABLET ORAL at 16:20

## 2021-08-04 RX ADMIN — PHYTONADIONE 5 MG: 10 INJECTION, EMULSION INTRAMUSCULAR; INTRAVENOUS; SUBCUTANEOUS at 21:44

## 2021-08-04 RX ADMIN — MIDODRINE HYDROCHLORIDE 10 MG: 5 TABLET ORAL at 17:53

## 2021-08-04 RX ADMIN — DISULFIRAM 250 MG: 250 TABLET ORAL at 15:06

## 2021-08-04 RX ADMIN — VANCOMYCIN HYDROCHLORIDE 125 MG: 125 CAPSULE ORAL at 16:20

## 2021-08-04 ASSESSMENT — ACTIVITIES OF DAILY LIVING (ADL): ADLS_ACUITY_SCORE: 15

## 2021-08-04 ASSESSMENT — ENCOUNTER SYMPTOMS: SHORTNESS OF BREATH: 1

## 2021-08-04 NOTE — PROGRESS NOTES
Pt is complaining about L. Side of abdomen. Pt claims oxycodone 2.5 mg does not help and usually oxycodone of 10 mg helps. Requested fluid replacement post paracentesis. Paged Dr. Donahue about it.

## 2021-08-04 NOTE — ED NOTES
"ER MD in to assess pt and plan of care. Pt states \"My kidneys hurt because they're failing\". MD notes tenderness to lower spine. PT agrees he needs to stay in the hospital overnight, mentions he has a  named Omayra.  "

## 2021-08-04 NOTE — PROCEDURES
[facility]    Procedure: IR Procedure Note    Date/Time: 8/4/2021 4:24 PM  Performed by: Rosa Maria Meyer MD  Authorized by: Rosa Maria Meyer MD     UNIVERSAL PROTOCOL   Site Marked: NA  Prior Images Obtained and Reviewed:  Yes  Required items: Required blood products, implants, devices and special equipment available    Patient identity confirmed:  Verbally with patient, arm band, provided demographic data and hospital-assigned identification number  Patient was reevaluated immediately before administering moderate or deep sedation or anesthesia  Confirmation Checklist:  Patient's identity using two indicators, relevant allergies, procedure was appropriate and matched the consent or emergent situation and correct equipment/implants were available  Time out: Immediately prior to the procedure a time out was called    Universal Protocol: the Joint Commission Universal Protocol was followed    Preparation: Patient was prepped and draped in usual sterile fashion           ANESTHESIA    Anesthesia: Local infiltration  Local Anesthetic:  Lidocaine 1% without epinephrine      SEDATION    Patient Sedated: No    See dictated procedure note for full details.  Findings: Large ascites    Specimens: fluid and/or tissue for gram stain and culture    Complications: None    Condition: Stable    PROCEDURE   Patient Tolerance:  Patient tolerated the procedure well with no immediate complications    Length of time physician/provider present for 1:1 monitoring during sedation: 0

## 2021-08-04 NOTE — ED NOTES
"Pt lives at home alone, has not eaten for 2-3 days, EMS reports several meals on wheels packages outside of his door. He states he has not bathed \"for quite a while\", disheveled appearance. He is not taking his medications. He complains of weakness, loss of appetite, and diarrhea. Pt's abdomen is grossly distended, he las had a paracentesis on Tuesday. Recently seen in the ER with a fall. Pt's care plan states if he returns to the ER his next step should be going to a TCU.  "

## 2021-08-04 NOTE — ED TRIAGE NOTES
"[facility]  ED Arrival Note      Means of Arrival: EMS  Comes from: Home    Story: Pt called EMS \"for not feeling well\" pt reports not taking his medications. Pt c/o distended abdomen and requiring paracentisis.        EMS/PD Interventions: PIV  EMS Medications: N/A    Meets Stroke Criteria? No  Meets Trauma Criteria? No      Directed to: Main ED  Belongings: Remain with patient          "

## 2021-08-04 NOTE — PHARMACY-ADMISSION MEDICATION HISTORY
Pharmacy Medication History  Admission medication history interview status for the 8/3/2021  admission is complete. See EPIC admission navigator for prior to admission medications     Location of Interview: Patient room  Medication history sources: Patient    Significant changes made to the medication list:  none    In the past week, patient estimated taking medication this percent of the time: 50-90% due to illness    Additional medication history information:   Patient stated the last time he had taken any of his medications was last week.    Medication reconciliation completed by provider prior to medication history? Yes    Time spent in this activity: 30 minutes    Prior to Admission medications    Medication Sig Last Dose Taking? Auth Provider   albuterol (PROAIR HFA/PROVENTIL HFA/VENTOLIN HFA) 108 (90 Base) MCG/ACT inhaler Inhale 2 puffs into the lungs every 4 hours as needed for shortness of breath / dyspnea or wheezing as needed Yes Gray Burns MD   disulfiram (ANTABUSE) 250 MG tablet Take 1 tablet (250 mg) by mouth daily Past Week at Unknown time Yes Gray Burns MD   finasteride (PROSCAR) 5 MG tablet Take 1 tablet (5 mg) by mouth daily Past Week at Unknown time Yes Gray Burns MD   fluticasone-vilanterol (BREO ELLIPTA) 200-25 MCG/INH inhaler Inhale 1 puff into the lungs daily as needed (SOB) as needed Yes Jeffrey Villagomez MD   gabapentin (NEURONTIN) 100 MG capsule Take 2 capsules (200 mg) by mouth 3 times daily Past Week at Unknown time Yes Gray Burns MD   hydrOXYzine (ATARAX) 50 MG tablet Take 50 mg by mouth 3 times daily as needed  Past Week at Unknown time Yes Unknown, Entered By History   lactulose (CHRONULAC) 10 GM/15ML solution Take 15 mLs (10 g) by mouth 2 times daily Past Week at Unknown time Yes Gray Burns MD   miconazole (MICATIN) 2 % external powder Apply topically 2 times daily Past Week at Unknown time Yes Gray Burns  MD Kurt   midodrine (PROAMATINE) 10 MG tablet Take 1 tablet (10 mg) by mouth 3 times daily (with meals) Past Week at Unknown time Yes Gray Burns MD   mirtazapine (REMERON) 30 MG tablet Take 30 mg by mouth At Bedtime Filled 1/11/21 for #30 Past Week at Unknown time Yes Unknown, Entered By History   multivitamin w/minerals (THERA-VIT-M) tablet Take 1 tablet by mouth daily Past Week at Unknown time Yes Jeffrey Villagomez MD   ondansetron (ZOFRAN-ODT) 4 MG ODT tab Take 1 tablet (4 mg) by mouth every 6 hours as needed for nausea or vomiting as needed Yes Gray Burns MD   oxyCODONE (ROXICODONE) 5 MG tablet Take 0.5 tablets (2.5 mg) by mouth every 6 hours as needed for moderate to severe pain Past Month at Unknown time Yes Gray Burns MD   pantoprazole (PROTONIX) 40 MG EC tablet Take 1 tablet (40 mg) by mouth 2 times daily Past Week at Unknown time Yes Sara Orellana MD   QUEtiapine (SEROQUEL) 200 MG tablet Take 200 mg by mouth At Bedtime Filled 1/11/21 #30  Past Week at Unknown time Yes Unknown, Entered By History   QUEtiapine (SEROQUEL) 50 MG tablet Take 1 tablet (50 mg) by mouth 3 times daily as needed (anxiety) Filled 11/20/20 #30 as needed Yes Sara Orellana MD   spironolactone (ALDACTONE) 25 MG tablet Take 1 tablet (25 mg) by mouth 2 times daily Past Week at Unknown time Yes Gray Burns MD   tamsulosin (FLOMAX) 0.4 MG capsule Take 2 capsules (0.8 mg) by mouth daily Past Week at Unknown time Yes Sara Orellana MD   traZODone (DESYREL) 100 MG tablet Take 1 tablet (100 mg) by mouth At Bedtime Past Week at Unknown time Yes Gray Burns MD   vortioxetine (TRINTELLIX) 10 MG tablet Take 10 mg by mouth 2 times daily Past Week at Unknown time Yes Unknown, Entered By History       The information provided in this note is only as accurate as the sources available at the time of update(s)

## 2021-08-04 NOTE — PLAN OF CARE
Pt A&Ox 4. CMS intact. VSS on RA. Lungs clear. BS present. Abdominal distension. Complaining L.upper quadrant pain.Taking oxycodone and atarax for pain. Clear liquid.Continue to monitor.

## 2021-08-04 NOTE — PROGRESS NOTES
[facility]    Medicine Progress Note - Hospitalist Service       Date of Admission:  8/3/2021    Assessment & Plan         Jim Richard is a 66-year-old gentleman with a complex past medical history of alcohol abuse/dependence complicated by alcoholic liver disease with ascites, status post multiple paracentesis in the past, hepatorenal syndrome, chronic kidney disease stage IV, history of recent bacteremia due to Enterococcus, hypertension, COPD, tobacco use disorder, depression/anxiety, thrombocytopenia and obstructive sleep apnea, who presented to ER for evaluation of generalized weakness, diarrhea and dry heaves.     Decompensated alcoholic liver cirrhosis with recurrent ascites  Suspected hepato-renal syndrome with CKD stage IV   Patient has been having issues with recurrent ascites requiring multiple paracenteses.  It seems that last paracentesis was on 07/26/2021 when 8.7 liters of fluid was removed before that had paracentesis on 07/2015 with 5.2 liters fluid removed.  It looks like his prior ascites fluid analysis was suggestive of SBP.  Now, he has significant abdominal distention.  I think that some of his symptoms of dry heaves, without shortness of breath are related to his significant abdominal distention.   - Paracentesis ordered   - Will hold PTA diuretics for a day given the diarrhea to prevent dehydration   - Repeat BMP in AM   - Holding PTA Lactulose due to his diarrhea     C Diff colitis  Patient reports diarrhea the past week.  C diff PCR positive in the ED   - Enteric precautions   - PO Vancomycin   - Continue to monitor stool output     Alcoholism/alcohol dependence  Alcohol intoxication  History of alcohol withdrawals  Patient had a recent hospitalization at [facility] from where he was discharged to TCU.  After he returned back home, he started drinking again.  He admits he drinks 1 pint of vodka daily.  He stated that he has a .  His  ethanol level is 0.23.    - Continue multivitamins, folic acid and thiamine  - CIWA protocol in place  - May consider psychiatry evaluation prior to discharge     Atypical chest pain   Patient continues to complain of chest pain since arrival to the ED.  Initial troponin negative.  EKG without evidence of acute ischemia  - Repeat troponin   - Continue to monitor      Generalized weakness  Failure to thrive  This is likely multifactorial from his chronic alcohol use, alcoholic liver cirrhosis, poor oral intake, and recent diarrhea.    - PT & /care coordinator consulted    Hypoglycemia    Blood sugars were in the 40s upon arrival in the ER.  This is likely due to poor oral intake.  He was given a couple of doses of D50 with improvement of his blood sugars.    - Hypoglycemia protocol in place  - Monitor for now      Tobacco use disorder  - Nicotine patch had been ordered    Recent Enterococcus bacteremia  He had been treated with ampicillin IV for 2 weeks as per ID recommendations.  He denies any fevers or leukocytosis at this time.    H/o BPH  - PTA Flomax and Proscar    History of COPD  This does not seem to be an acute issue at this time.    - PTA inhalers     History of depression/anxiety  - PTA medications resumed     Obstructive sleep apnea   Noncompliant with CPAP.    Thrombocytopenia, chronic, related to his chronic liver disease  There is no evidence of active bleeding.  Platelet count on admission was 148.    - We will monitor it periodically       Diet: Advance Diet as Tolerated: Clear Liquid Diet    DVT Prophylaxis: Pneumatic Compression Devices  Leger Catheter: Not present  Central Lines: None  Code Status: No CPR- Do NOT Intubate      Disposition Plan   Expected discharge: 08/06/2021, likely to TCU.  Needs to have improvement in diarrhea      The patient's care was discussed with the Bedside Nurse and Patient.    Time Spent on this Encounter   Over 35 minutes was spent examining and  discussing the patient with greater than 50% time spent in counseling and/or coordination of care.     Godfrey Vasques,   Hospitalist Service  [facility]  Securely message with the Vocera Web Console (learn more here)  Text page via [employer] Paging/Directory      Clinically Significant Risk Factors Present on Admission             # Coagulation Defect: INR = 1.36 (Ref range: 0.85 - 1.15) and/or PTT = N/A on admission, will monitor for bleeding  # Thrombocytopenia: Plts = 109 10e3/uL (Ref range: 150 - 450 10e3/uL) on admission, will monitor for bleeding      ______________________________________________________________________    Interval History   Patient seen and examined.  No acute events over night.  Reported 3 episodes of diarrhea since admission.  Also continues to have chest pain since arrival to the ED.  No fevers or chills.  Abdominal pain noted.     Data reviewed today: I reviewed all medications, new labs and imaging results over the last 24 hours. I personally reviewed no images or EKG's today.    Physical Exam   Vital Signs: Temp: 97.8  F (36.6  C) Temp src: Oral BP: 139/57 Pulse: 79   Resp: 20 SpO2: 96 % O2 Device: None (Room air)    Weight: 190 lbs 0 oz  General Appearance: Resting comfortably.  NAD   Respiratory: Clear to auscultation.  No respiratory distress  Cardiovascular: RRR.  No obvious murmurs  GI: Moderately distended and diffusely tender  Skin: Bruising noted to his extremities  Other: Alert.  Moving all extremities grossly     Data   Recent Labs   Lab 08/04/21  1352 08/04/21  1153 08/04/21  0929 08/04/21  0440 08/03/21 2017   WBC  --   --  4.5  --  5.5   HGB  --   --  7.5*  --  8.5*   MCV  --   --  96  --  98   PLT  --   --  109*  --  148*   INR  --   --   --   --  1.36*   NA  --   --  133  --  139   POTASSIUM  --   --  4.0  --  4.0   CHLORIDE  --   --  109  --  111*   CO2  --   --  18*  --  14*   BUN  --   --  41*  --  43*   CR  --   --  2.27*  --  2.18*    ANIONGAP  --   --  6  --  14   KEV  --   --  8.4*  --  8.3*   *  --  127* 86 52*   ALBUMIN  --  2.8*  --   --  2.9*   PROTTOTAL  --   --   --   --  7.0   BILITOTAL  --   --   --   --  1.0   ALKPHOS  --   --   --   --  176*   ALT  --   --   --   --  17   AST  --   --   --   --  43   LIPASE  --   --   --   --  604*   TROPONIN  --   --   --   --  0.016     Recent Results (from the past 24 hour(s))   CT Head w/o Contrast    Narrative    CT HEAD W/O CONTRAST 8/3/2021 9:42 PM    INDICATION: Head injury from fall.  TECHNIQUE: CT scan of the head without contrast. Dose reduction  techniques were used.  CONTRAST: None.  COMPARISON: Head CT 3/10/2021    FINDINGS:   No intracranial hemorrhage, extraaxial collection, mass effect or CT  evidence of acute infarct.  Normal parenchymal density for age. Mild  generalized volume loss. The ventricles are proportional to the sulci.  Osseous structures are intact. Unremarkable orbits. Paranasal sinuses  are free of significant disease. Clear mastoid air cells.      Impression    IMPRESSION:  No intracranial hemorrhage, mass, or definite CT evidence of recent  ischemia.    BLAIR DARBY MD         SYSTEM ID:  [id number]   Lumbar spine CT w/o contrast    Narrative    EXAM: CT LUMBAR SPINE W/O CONTRAST  LOCATION: [facility]  DATE/TIME: 8/3/2021 11:03 PM    INDICATION: Trauma - L-Spine Injury.    COMPARISON: CT abdomen and pelvis 6/29/2021.    TECHNIQUE: Routine CT Lumbar Spine without IV contrast. Multiplanar reformats. Dose reduction techniques were used.     FINDINGS:  VERTEBRA: Five lumbar-type vertebrae. Vertebral body height is normal. There is stable grade 1 degenerative anterolisthesis of L4 on L5 of approximately 6 mm. Posterior instrumented fusion from L3 through S1. The hardware is intact. There is some lucency   marginating the bilateral S1 screws suggesting loosening. Decompressive laminectomy from L3 through L5. Advanced disc  degeneration at L2-L3. Diffuse bony demineralization. No definite acute fracture or subluxation.     CANAL/FORAMINA: No high grade central canal or neural foraminal narrowing.    PARASPINAL: Moderate ascites.      Impression    IMPRESSION:  1.  No definite acute fracture or subluxation.  2.  Lucency marginating the bilateral S1 transpedicular screws suggestive of loosening. The hardware is intact.  3.  Postoperative changes elsewhere as described.

## 2021-08-04 NOTE — PROGRESS NOTES
6650 ml of fluid removed during para. VSS on room air. Report given to bedside RN. Pt transferred back to Unit

## 2021-08-04 NOTE — ED NOTES
PT incontinent of stool, writer assisted in changing the bed sheets and brief and doing linda-care. Moderate amount of dark watery stool with foul odor.

## 2021-08-04 NOTE — ED NOTES
Bed: ED23  Expected date: 8/3/21  Expected time: 7:55 PM  Means of arrival: Ambulance  Comments:   66M weakness

## 2021-08-04 NOTE — H&P
Admitted: 08/03/2021    CHIEF COMPLAINT:  Generalized weakness, diarrhea and failure to thrive.    HISTORY OF PRESENT ILLNESS:  This has been obtained partially from the patient, who is a relatively good historian.  I did discuss with ER attending, Dr. Capone and I reviewed his chart as well.    Mr. Jim Richard is a 66-year-old gentleman with a complex past medical history of end stage alcoholic liver cirrhosis, who has recurrent ascites requiring multiple paracentesis in the past, history of hepatorenal syndrome, alcohol abuse/dependence with history of withdrawals, history of bacteremia with Enterococcus, depression/anxiety, hypertension, COPD, tobacco use disorder, obstructive sleep apnea and thrombocytopenia, who was brought in for evaluation of generalized weakness, diarrhea and dry heaves.    The patient had a recent prolonged hospitalization at [facility] from 06/25/2021-07/14/2021.  At that time, he was treated for recurrent ascites with multiple paracentesis.  Of note, ascites fluid analysis was not suggestive of SBP.  He was also noted to have worsening renal function, thought to be due to hepatorenal syndrome.  He was followed by Nephrology.  He was also noted to have fevers and blood cultures that were positive for Enterococcus and he was treated with IV ampicillin as per ID.  Regarding his ongoing alcohol drinking and dependence, he was seen by chemical dependency during his prior hospitalization, who recommended a TCU with CD services.  The patient was discharged to TCU and from there he was discharged back home.  The patient started drinking again.  He reports drinking 1 pint of vodka daily.    He states that for the last few days, he is not feeling well.  He feels bloated and has abdominal distention.  He reports that he cannot eat because of abdominal distention.  He reports having some nausea and dry heaving, but no real vomiting.  He reports that he is feeling short of breath  "because of abdominal distention.  Upon further questioning, he reports that last week, he had diarrhea described as a \"black stools that look like Coke in color.\"  He also reports having some left sided abdominal pain.  He also reports that he has night sweats and sometimes he feels cold alternating with a hot feeling.  Overall, he feels very weak and he reports that he had multiple falls at home.  He denies head trauma.      He called EMS today.  As per report, he was found very disheveled.  He was brought to the ER for further evaluation.    In the ER, he was seen by Dr. Capone.  His vitals in the ER showed a blood pressure of 164/84, heart rate was 80, respiratory rate 20, oxygen saturation 98% on room air and temperature 98.8.  He did have basic blood work, which showed a BMP with sodium of 139, potassium 4, chloride 111, bicarbonate 14, BUN 43, creatinine 2.18.  His calcium was 8.3, anion gap of 14, magnesium 1.7, albumin 2.9, total protein 7, total bilirubin 1, alkaline phosphatase 176, ALT 17, AST 43.  Lactic acid 1.7, lipase is 604.  Troponin 0.016.  His glucose was 44.  He was given an ample of D50.  His blood sugars improved to 70.  His CBC with white blood cells of 5.5, hemoglobin 8.5, hematocrit 25.3 and platelet count 148.  INR 1.36, his ethanol level was 0.23.  He was tested negative for COVID-19.  His UA was negative with white blood cells of 1, negative leukocyte esterase, negative nitrites.  He had a CT of the head given his recent falls, which was negative for any acute intracranial pathology.  He also had a CT of the L-spine given reported lower back pain, which did not show any acute fracture or dislocation, just some lucency marginated the bilateral S1 transpedicular screws suggestive of loosening.  The hardware is intact.    In the ER, he was given 3 amps of dextrose, one dose of Zofran IV, and Hospitalist service was called regarding the admission.    PAST MEDICAL HISTORY:     1.  End-stage " liver cirrhosis.  2.  Recurrent ascites requiring multiple paracentesis in the past.  Last paracentesis was on 07/26/2021, when 8.7 liters of fluid was removed.  Prior to that, he had a paracentesis on 07/2015 with a 5.2 liters fluid removed 07/07/2021 with 3 liters of fluid removed.  3.  Hepatorenal syndrome.  4.  Chronic kidney disease stage IV, baseline creatinine between 2-2.2.  5.  Alcoholism.  6.  History of alcohol withdrawals.  7.  Depression/anxiety.  8.  Hypertension.  9.  Tobacco use disorder.  10.  COPD.  11.  Obstructive sleep apnea, noncompliant with CPAP.  12.  Thrombocytopenia.  13.  Recent bacteremia due to Enterococcus.  He had been on 2 weeks of IV ampicillin.    PAST SURGICAL HISTORY:    Past Surgical History:   Procedure Laterality Date     APPENDECTOMY       BYPASS GRAFT FEMOROPOPLITEAL  6/12/2012    Procedure: BYPASS GRAFT FEMOROPOPLITEAL;  LEFT FEMORAL TO ABOVE KNEE POPLITEAL BYPASS, ENDARTERECTOMY OF SFA AND PF ARTERIES, LEFT EXTERNAL ILIAC TO COMMON FEMORAL INTERPOSITION GRAFT;  Surgeon: Damir Roberts MD;  Location:  OR     COLONOSCOPY       COLONOSCOPY N/A 8/8/2019    Procedure: COLONOSCOPY;  Surgeon: Pavan Arguello MD;  Location:  GI     COLONOSCOPY N/A 3/10/2020    Procedure: COLONOSCOPY;  Surgeon: Rosendo Shaw MD;  Location:  GI     ENDARTERECTOMY FEMORAL  6/12/2012    Procedure: ENDARTERECTOMY FEMORAL;;  Surgeon: Damir Roberts MD;  Location:  OR     ESOPHAGOSCOPY, GASTROSCOPY, DUODENOSCOPY (EGD), COMBINED N/A 3/22/2018    Procedure: COMBINED ESOPHAGOSCOPY, GASTROSCOPY, DUODENOSCOPY (EGD);  ESOPHAGOSCOPY, GASTROSCOPY, DUODENOSOCPY.;  Surgeon: Valeri Pruitt MD;  Location:  OR     ESOPHAGOSCOPY, GASTROSCOPY, DUODENOSCOPY (EGD), COMBINED N/A 9/29/2018    Procedure: COMBINED ESOPHAGOSCOPY, GASTROSCOPY, DUODENOSCOPY (EGD);;  Surgeon: Rosendo Shaw MD;  Location:  GI     ESOPHAGOSCOPY, GASTROSCOPY, DUODENOSCOPY (EGD), COMBINED N/A 8/2/2019     Procedure: ESOPHAGOGASTRODUODENOSCOPY (EGD);  Surgeon: Pavan Arguello MD;  Location:  GI     ESOPHAGOSCOPY, GASTROSCOPY, DUODENOSCOPY (EGD), COMBINED N/A 9/25/2019    Procedure: ESOPHAGOGASTRODUODENOSCOPY (EGD);  Surgeon: Pavan Arguello MD;  Location:  GI     ESOPHAGOSCOPY, GASTROSCOPY, DUODENOSCOPY (EGD), COMBINED N/A 3/8/2020    Procedure: ESOPHAGOGASTRODUODENOSCOPY (EGD);  Surgeon: Rosendo Shaw MD;  Location:  GI     ESOPHAGOSCOPY, GASTROSCOPY, DUODENOSCOPY (EGD), COMBINED N/A 6/11/2020    Procedure: ESOPHAGOGASTRODUODENOSCOPY (EGD);  Surgeon: Rosendo Shaw MD;  Location:  GI     ESOPHAGOSCOPY, GASTROSCOPY, DUODENOSCOPY (EGD), COMBINED N/A 1/23/2021    Procedure: ESOPHAGOGASTRODUODENOSCOPY (EGD);  Surgeon: Pavan Arguello MD;  Location:  GI     HERNIA REPAIR  2017    Abdominal     LAMINECTOMY, FUSION LUMBAR THREE+ LEVEL, COMBINED N/A 9/22/2014    Procedure: COMBINED LAMINECTOMY, FUSION LUMBAR THREE+ LEVEL;  Surgeon: Sebastien Pruitt MD;  Location:  OR     TONSILLECTOMY & ADENOIDECTOMY       US PARACENTESIS  4/4/2021     US PARACENTESIS  4/19/2021       SOCIAL HISTORY:  The patient admits that he drinks 1 pint of vodka daily, last drink was yesterday around 3:00 p.m., he reports smoking 1 pack of cigarettes daily.  He denies illicit drug abuse.    FAMILY HISTORY:    Family History     Problem (# of Occurrences) Relation (Name,Age of Onset)    Coronary Artery Disease Early Onset (1) Mother (Tunde)    Lung Cancer (1) Father (Frederic)    Mental Illness (1) Son (Aj)    Substance Abuse (2) Father (Frederic), Brother (Blake)       Negative family history of: Unknown/Adopted, Depression, Anxiety Disorder, Schizophrenia, Bipolar Disorder, Suicide, Dementia, Hornbeck Disease, Parkinsonism, Autism Spectrum Disorder, Intellectual Disability (Mental Retardation)          PRIOR TO ADMISSION MEDICATIONS:   No current facility-administered medications on file prior to encounter.  albuterol (PROAIR  HFA/PROVENTIL HFA/VENTOLIN HFA) 108 (90 Base) MCG/ACT inhaler, Inhale 2 puffs into the lungs every 4 hours as needed for shortness of breath / dyspnea or wheezing  disulfiram (ANTABUSE) 250 MG tablet, Take 1 tablet (250 mg) by mouth daily  finasteride (PROSCAR) 5 MG tablet, Take 1 tablet (5 mg) by mouth daily  fluticasone-vilanterol (BREO ELLIPTA) 200-25 MCG/INH inhaler, Inhale 1 puff into the lungs daily as needed (SOB)  gabapentin (NEURONTIN) 100 MG capsule, Take 2 capsules (200 mg) by mouth 3 times daily  hydrOXYzine (ATARAX) 50 MG tablet, Take 50 mg by mouth 3 times daily as needed   lactulose (CHRONULAC) 10 GM/15ML solution, Take 15 mLs (10 g) by mouth 2 times daily  miconazole (MICATIN) 2 % external powder, Apply topically 2 times daily  midodrine (PROAMATINE) 10 MG tablet, Take 1 tablet (10 mg) by mouth 3 times daily (with meals)  mirtazapine (REMERON) 30 MG tablet, Take 30 mg by mouth At Bedtime Filled 1/11/21 for #30  multivitamin w/minerals (THERA-VIT-M) tablet, Take 1 tablet by mouth daily  ondansetron (ZOFRAN-ODT) 4 MG ODT tab, Take 1 tablet (4 mg) by mouth every 6 hours as needed for nausea or vomiting  oxyCODONE (ROXICODONE) 5 MG tablet, Take 0.5 tablets (2.5 mg) by mouth every 6 hours as needed for moderate to severe pain  pantoprazole (PROTONIX) 40 MG EC tablet, Take 1 tablet (40 mg) by mouth 2 times daily  QUEtiapine (SEROQUEL) 200 MG tablet, Take 200 mg by mouth At Bedtime Filled 1/11/21 #30   QUEtiapine (SEROQUEL) 50 MG tablet, Take 1 tablet (50 mg) by mouth 3 times daily as needed (anxiety) Filled 11/20/20 #30  spironolactone (ALDACTONE) 25 MG tablet, Take 1 tablet (25 mg) by mouth 2 times daily  tamsulosin (FLOMAX) 0.4 MG capsule, Take 2 capsules (0.8 mg) by mouth daily  traZODone (DESYREL) 100 MG tablet, Take 1 tablet (100 mg) by mouth At Bedtime  vortioxetine (TRINTELLIX) 10 MG tablet, Take 10 mg by mouth 2 times daily      ALLERGIES:    Allergies   Allergen Reactions     Amlodipine Swelling      Lisinopril      Other reaction(s): Angioedema  Mouth and tongue swelling   Mouth and tongue swelling          REVIEW OF SYSTEMS:  A 10-point review of systems was conducted and is negative except for pertinent positives mentioned in history of present illness.    PHYSICAL EXAMINATION:    VITAL SIGNS:  Blood pressure is 133/67, heart rate 74, respiratory rate 16, oxygen saturation 99% on room air, temperature 98.8.  GENERAL:  The patient is awake, alert, no acute distress at the time of my examination.  HEENT:  Normocephalic, atraumatic.  Pupils are equally round and reactive to light.  Oral mucosa is mildly dry.  NECK:  Supple, no cervical lymphadenopathy, no thyromegaly.  CHEST:  There is bilateral air entry.  No wheezing, no rales, no crackles.  CARDIOVASCULAR:  There is normal S1 and S2, regular rate and rhythm.  No murmurs, no rubs.  ABDOMEN:  Soft, distended, mildly tender to palpation on left side of the abdomen.  Bowel sounds are present.  There is a large umbilical hernia, nontender.  EXTREMITIES:  He has trace edema of bilateral lower extremities, no calf tenderness.  SKIN:  He has multiple bruises over his upper extremities.  No rashes.  NEUROLOGIC:  The patient is awake, alert, oriented to self, place and time.  There are no focal neurological deficits.  PSYCHIATRIC:  Normal mood, normal affect.  MUSCULOSKELETAL:  He moves all extremities freely.  There are no obvious joint deformities.    LABORATORY DATA:  Reviewed and dictated above.    IMPRESSION AND PLAN:  Mr. Jim Richard is a 66-year-old gentleman with a complex past medical history of alcohol abuse/dependence complicated by alcoholic liver disease with ascites, status post multiple paracentesis in the past, hepatorenal syndrome, chronic kidney disease stage IV, history of recent bacteremia due to Enterococcus, hypertension, COPD, tobacco use disorder, depression/anxiety, thrombocytopenia and obstructive sleep apnea, who presented to ER for  evaluation of generalized weakness, diarrhea and dry heaves.    PLAN:     1.  Alcoholic liver cirrhosis.   2.  Recurrent ascites.     He had recurrent ascites in the past requiring multiple paracenteses.  It seems that last paracentesis was on 07/26/2021 when 8.7 liters of fluid was removed before that had paracentesis on 07/2015 with 5.2 liters fluid removed.  It looks like his prior ascites fluid analysis was now suggestive of SBP.  Now, he has significant abdominal distention.  I think that some of his symptoms of dry heaves, without shortness of breath are related to his significant abdominal distention.  We will await for medication reconciliation.  Apparently, he is on Aldactone and lactulose at home, although he admits that he did not take his medications in the last week.  We will order a paracentesis for tomorrow with fluid analysis.    3.  Generalized weakness.  4.  Failure to thrive.    This is likely multifactorial from his chronic alcohol use, alcoholic liver cirrhosis, poor oral intake, and recent diarrhea.  We will try to address each possible reversible condition as mentioned, he will be placed on fall precautions, PT evaluation has been ordered as well as /care coordinator consult.  I anticipate that he should be discharged to TCU.    6.  Diarrhea.  7.  Reported black stools.    He reports some nausea and dry heaving.  He denies vomiting or abdominal pain.  He did report that last week, he had some diarrhea.  He had been on antibiotics recently.  Stool for Clostridium difficile had been ordered until we have the results back he will be placed on enteric precautions.  He did report having some black stools.  His hemoglobin is reassuring at 8.5, which is actually higher than his prior hemoglobin levels, but I anticipate this might be slightly hemoconcentrated due to lower prerenal volume.  We will check his hemoglobin every 6 hours for  3 occurrences and resume his [id number]-lbcznekbg,  Protonix 40 mg p.o. twice daily.    8.  Hypoglycemia.    His blood sugars were in the 40s upon arrival in the ER.  This is likely due to poor oral intake.  He was given a couple of doses of D50 with improvement of his blood sugars.  Hypoglycemia protocol in place.    9.  Alcoholism/alcohol dependence.    10. Alcohol intoxication.    11. History of alcohol withdrawals.    He had a recent hospitalization at [facility] from where he was discharged to TCU.  After he returned back home, he started drinking again.  He admits he drinks 1 pint of vodka daily.  He states that he has a .  His ethanol level is 0.23.  We will check a phosphorus and magnesium and replace as per protocol.  We will start him on multivitamins, folic acid and thiamine.  We will order CIWA protocol for monitoring.  I did not order lorazepam or Ativan at this time.  This should be ordered if he presents symptoms of withdrawals.    12.  CKD stage IV, baseline creatinine between 2-2.2.  Creatinine today is 2.18, which is around his baseline.  We will avoid nephrotoxic drugs and repeat BMP in the morning.    13.  Tobacco use disorder.  Nicotine patch had been ordered.    14.  Recent Enterococcus bacteremia.  He had been treated with ampicillin IV for 2 weeks as per ID recommendations.  He denies any fevers or leukocytosis at this time.    15.  History of BPH.  We will resume his osmqm-eb-cwskndpbe Flomax and Proscar.    16.  History of COPD.  This does not seem to be an acute issue at this time.  We will resume his zgaky-kw-ncydonzbo inhalers.    17.  History of depression/anxiety:  Awaiting for his medication reconciliation before resuming his meds.    18.  Obstructive sleep apnea, noncompliant with CPAP.    19.  Thrombocytopenia, chronic, related to his chronic liver disease.  There is no evidence of active bleeding.  Platelet count today is 148.  We will monitor it periodically.    DEEP VENOUS THROMBOSIS PROPHYLAXIS:   Pneumatic compression device.    CODE STATUS:  Discussed with the patient.  The patient wishes to be DNR/DNI.    DISPOSITION:  Admit under observational status.  I anticipate patient will be discharged in the next 24-48 hours, pending PT evaluation and safe discharge planning.    Azul Hannah MD        D: 2021   T: 2021   MT: DELMY    Name:     DEVONTE SEGURA  MRN:      [id number]        Account:     [id number]   :      1955           Admitted:    2021       Document: [id number]

## 2021-08-04 NOTE — PROGRESS NOTES
RECEIVING UNIT ED HANDOFF REVIEW    ED Nurse Handoff Report was reviewed by: Danielle Cook RN on August 4, 2021 at 9:54 AM

## 2021-08-04 NOTE — UTILIZATION REVIEW
Admission Status; Secondary Review Determination     Admission Date: 8/3/2021  8:00 PM       Under the authority of the Utilization Management Committee, the utilization review process indicated a secondary review on the above patient.  The review outcome is based on review of the medical records, discussions with staff, and applying clinical experience noted on the date of the review.        (x)      Inpatient Status Appropriate - This patient's medical care is consistent with medical management for inpatient care and reasonable inpatient medical practice.       RATIONALE FOR DETERMINATION      Brief clinical presentation, information copied from the chart, abbreviated and edited for relevant content:       Paged attending to advance to IP. Patient with multiple presenting complaints, unable to discharge. Need for ongoing care.     Jim Richard is a 66-year-old man with a complex past medical history of alcohol abuse/dependence complicated by alcoholic liver disease with ascites, status post multiple paracentesis in the past, hepatorenal syndrome, chronic kidney disease stage IV, history of recent bacteremia due to Enterococcus, hypertension, COPD, tobacco use disorder, depression/anxiety, thrombocytopenia and obstructive sleep apnea, who presented to ER for evaluation of generalized weakness, diarrhea and dry heaves. Admitted with decompensated alcoholic liver cirrhosis with recurrent ascites and Suspected hepato-renal syndrome with CKD stage IV   Patient has been requiring multiple paracenteses.  It seems that last paracentesis was on 07/26/2021 when 8.7 liters of fluid was removed before that had paracentesis on 07/2015 with 5.2 liters fluid removed.  Now, he has significant abdominal distention. Paracentesis ordered and Will hold PTA diuretics for a day given the diarrhea to prevent dehydration   Plan for  Repeat BMP in AM. Also with  Diff colitis  Patient reports diarrhea the past week.and C diff PCR  positive in the ED. Enteric precautions and PO Vancomycin. Also with Alcoholism/alcohol dependence and History of alcohol withdrawals  Patient had a recent hospitalization at [facility] from where he was discharged to TCU.  After he returned back home, he started drinking again.  He admits he drinks 1 pint of vodka daily.  He stated that he has a .  His ethanol level is 0.23.  Plan to Continue multivitamins, folic acid and thiamine, [facility] protocol in place   May consider psychiatry evaluation prior to discharge        Currently more than 2 nights hospital complex care was anticipated. Also, there was a risk of adverse outcome if patient was treated outpatient or observation. High intensity of services anticipated. Inpatient admission appropriate based on Medicare guidelines.       The information on this document is developed by the utilization review team in order for the business office to ensure compliance.  This only denotes the appropriateness of proper admission status and does not reflect the quality of care rendered.         The definitions of Inpatient Status and Observation Status used in making the determination above are those provided in the CMS Coverage Manual, Chapter 1 and Chapter 6, section 70.4.      Sincerely,      Karishma Clark MD   Utilization Review/ Case Management  [facility].

## 2021-08-04 NOTE — ED NOTES
"[facility]  ED Nurse Handoff Report    ED Chief complaint: Abdominal Pain (grossly distended )      ED Diagnosis:   Final diagnoses:   Hypoglycemia   Failure to thrive in adult   Alcoholic intoxication without complication (H)   Diarrhea, unspecified type       Code Status: Full Code    Allergies:   Allergies   Allergen Reactions     Amlodipine Swelling     Lisinopril      Other reaction(s): Angioedema  Mouth and tongue swelling   Mouth and tongue swelling          Patient Story: Pt lives at home alone, has not eaten for 2-3 days, EMS reports several meals on wheels packages outside of his door. He states he has not bathed \"for quite a while\", disheveled appearance. He is not taking his medications. He complains of weakness, loss of appetite, and diarrhea. Pt's abdomen is grossly distended, he las had his last paracentesis on Tuesday, but states he was supposed to call today for an outpatient paracentesis, but he didn't. Recently seen in the ER with a fall. Pt's care plan by  states if he returns to the ER his next step should be going to a TCU.  He reports drinking 1 Pint of vodka a day, but endorses only drinking half a pint today. He denies any seizures or hallucinations with withdrawal.  Focused Assessment:  Pt arrived with low blood sugar, amp of D50 given. Pt incontinent of urine, no stools while in the ER. Significantly distended abdomen, pt complains of nausea, diarrhea, and bilateral lower back pain. Bruising on bilateral arms. Disheveled.  Mildly intoxicated.  Treatments and/or interventions provided:   Medications   dextrose 50 % injection 25 mL (25 mLs Intravenous Given 8/3/21 2021)   dextrose 50 % injection 50 mL (25 mLs Intravenous Given 8/3/21 2109)   ondansetron (ZOFRAN) injection 4 mg (4 mg Intravenous Given 8/3/21 2221)       Patient's response to treatments and/or interventions:     To be done/followed up on inpatient unit:      Does this patient have any cognitive " concerns?: none    Activity level - Baseline/Home:  Stand with Assist  Activity Level - Current:   Total Care    Patient's Preferred language: English   Needed?: No    Isolation: Enteric  Infection: C-Diff Pending  Patient tested for COVID 19 prior to admission: YES  Bariatric?: No    Vital Signs:   Vitals:    08/03/21 2100 08/03/21 2145 08/03/21 2200 08/03/21 2230   BP: (!) 158/87 (!) 161/78 (!) 164/93 (!) 177/83   Pulse: 89 73 75 80   Resp: 21  12 8   Temp:       TempSrc:       SpO2: 98% 99% 99% 99%   Weight:       Height:           Cardiac Rhythm:     Was the PSS-3 completed:   Yes  What interventions are required if any?               Family Comments: Brother available by phone.      For the majority of the shift this patient's behavior was Yellow.   Behavioral interventions performed were     ED NURSE PHONE NUMBER: [phone]

## 2021-08-04 NOTE — ED PROVIDER NOTES
History   Chief Complaint:  Abdominal Pain (grossly distended )       The history is provided by the patient.      Jim Richard is a 66 year old male with history of end-stage alcoholic cirrhosis with ascites, COPD and hypertension who presents with abdominal pain, distension, and diarrhea. The patient reports he was on antibiotics for 14 days for an abdominal infection. After finishing his medicine 1 week ago he has had 3-4 episodes of diarrhea per day. He reports left sided abdominal pain, bloating, and shortness of breath. He has had no episodes of diarrhea today. He drinks daily and his last drink was 12PM today. He denies suicidal thoughts, chest pain, cough, rhinorrhea, and vomiting. He reports falling recently but did not hit his head. The patient has not eaten today.     Review of Systems   HENT: Negative for rhinorrhea.    Respiratory: Positive for shortness of breath. Negative for cough.    Cardiovascular: Negative for chest pain.   Gastrointestinal: Positive for abdominal distention, abdominal pain and diarrhea. Negative for vomiting.   Psychiatric/Behavioral: Negative for suicidal ideas.   All other systems reviewed and are negative.      Allergies:  Amlodipine  Lisinopril    Medications:  albuterol inhaler  disulfiram   finasteride   fluticasone-vilanterol inhaler  gabapentin  hydroxyzine   lactulose   midodrine   mirtazapine   ondansetron   oxycodone  pantoprazole   quetiapine   spironolactone   tamsulosin   trazodone   Vortioxetine   furosemide   folic acid   potassium chloride   Methocarbamol  Magnesium oxide   amoxicillin-clavulanate    Past Medical History:    Alcoholism  Anxiety  COPD  CAD  Depression  Esophageal varices with bleeding  Heart attack  Hepatomegaly  Hyperlipemia  Hypertension  JANIS on CPAP  Peripheral vascular disease  SDH  Spinal stenosis  Substance abuse   Hypoglycemia  Failure to thrive in adult  End-stage Alcoholic Cirrhosis w Ascites  Hepatorenal syndrome  Skin tear of  "right elbow without complication  NGUYEN  Thrombocytopenia  Lactic acidosis  Colitis  Overdose  GI bleed  Depression with suicidal ideation  Acute blood loss anemia  Anemia, iron deficiency  Rib Fractures  PAD    Sleep apnea    Past Surgical History:    Appendectomy  Bypass graft femoropopliteal  Colonoscopy  Endarterectomy femoral  Esophagoscopy, gastroscopy, duodenoscopy  Hernia repair  Laminectomy, fusion lumbar   Tonsillectomy and adenoidectomy  US paracentesis     Family History:    Mental illness  CAD  Substance abuse  Lung cancer    Social History:  Presents unaccompanied   PCP: Talon Elias   Alcohol use: Daily drinker    Physical Exam     Patient Vitals for the past 24 hrs:   BP Temp Temp src Pulse Resp SpO2 Height Weight   08/04/21 0030 (!) 145/68 -- -- 84 12 96 % -- --   08/04/21 0015 -- -- -- 84 14 98 % -- --   08/03/21 2230 (!) 177/83 -- -- 80 8 99 % -- --   08/03/21 2200 (!) 164/93 -- -- 75 12 99 % -- --   08/03/21 2145 (!) 161/78 -- -- 73 -- 99 % -- --   08/03/21 2100 (!) 158/87 -- -- 89 21 98 % -- --   08/03/21 2030 (!) 170/84 -- -- 81 16 99 % -- --   08/03/21 2015 133/67 -- -- 73 22 98 % -- --   08/03/21 2010 133/67 98.8  F (37.1  C) Oral 72 15 99 % -- --   08/03/21 2006 -- -- -- -- -- -- 1.702 m (5' 7\") 86.2 kg (190 lb)   08/03/21 2005 -- -- -- 79 25 98 % -- --   08/03/21 2000 (!) 164/81 -- -- 79 -- -- -- --       Physical Exam  Constitutional: Well appearing.  HEENT: Atraumatic.  PERRL.  EOMI.  Moist mucous membranes.  Neck: Soft.  Supple.   Cardiac: Regular rate and rhythm.  No murmur or rub.  Respiratory: Clear to auscultation bilaterally.  No respiratory distress.   Abdomen: Soft and nontender.  It is mildly distended.  Musculoskeletal: No edema.  Normal range of motion.  Neurologic: Alert and oriented x3.  Normal tone and bulk.  No facial drooping.  Normal speech.  Strength symmetric.  Psych: Normal affect.  Normal behavior.      Emergency Department Course   ECG  ECG taken at 2041, ECG " read at 2041  Normal sinus rhythm. Right bundle branch block. Left anterior fascicular block. Bifascicular block. Septal infarct, age undetermined. Abnormal ECG.  No significant changes as compared to prior, dated 06/25/21.  Rate 81 bpm. OR interval 152 ms. QRS duration 146 ms. QT/QTc 470/545 ms. P-R-T axes 53 -50 38.     Imaging:  CT Head w/o IV contrast:  No intracranial hemorrhage, mass, or definite CT evidence of recent   ischemia.   Per radiology.    CT Lumbar spine w/o IV contrast:  1.  No definite acute fracture or subluxation.   2.  Lucency marginating the bilateral S1 transpedicular screws suggestive of loosening. The hardware is intact.   3.  Postoperative changes elsewhere as described.  Per radiology.    Laboratory:  CBC: WBC 5.5, HGB 8.5 (L),  (L)  CMP: chloride 111 (H), CO2 14 (L), Urea nitrogen 43 (H), calcium 8.3 (L), glucose 52 (L), alkaline phosphatase 176 (H), albumin 2.9 (L), GFR 30 (L) o/w WNL (Creatinine 2.18 (H))     Glucose by meter(Collection time 2016): 44(L)    Glucose by meter(Collection time 2050): 79   Glucose by meter(Collection time 2152): 70   Glucose by meter(Collection time 2323): 49(L)   Glucose by meter(Collection time 0024): 136(H)     Lactic acid (result time 2116) 1.7   Lipase: 604 (H)  Troponin (Collected 2017): 0.016  Magnesium: 1.7  Ethyl Alcohol Level: 0.23 (H)  INR: 1.36 (H)    UA with Microscopic: Ketones: 40, Blood: trace, Protein Albumin: 100, Mucous: present, o/w WNL     Asymptomatic COVID19 Virus PCR by nasopharyngeal swab: Negative     Emergency Department Course:    Reviewed:  I reviewed nursing notes, vitals, past medical history and care everywhere    Assessments:  2041 I obtained history and examined the patient as noted above.     Consults:   2316 I consulted with Dr. Hannah of the hospitalist services who is in agreement to accept the patient for admission.     Interventions:  2021 Dextrose 50%, 25 mL, IV  2109 Dextrose 50%, 25 mL, IV  2221 Ondansetron,  4 mg, IV   0000 Dextrose 50%, 50 ml, IV    Disposition:  The patient was admitted to the hospital under the care of Dr. Hannah.       Impression & Plan   Medical Decision Making:  Jim Richard is a 66-year-old man is afebrile and hemodynamically stable.  On arrival, he is slightly confused and has a blood glucose in the 40s.  He was given an amp of D50.  He was unable to take any p.o. due to nausea and dry heaving.  He did drop his glucose again to the 40s and required a subsequent amp of D50.  His lab work-up is noted as above.  He did obtain a CT scan of the head and lumbar spine is having continued ongoing pain.  He does have some abdominal distention, likely secondary to ascites, however, he has no pain with palpation, fever, leukocytosis that would necessitate the need for emergent paracentesis.  He is clearly exhibiting failure to thrive, likely secondary to his ongoing alcohol use.  He is also having diarrhea with recent antibiotic use and a C. difficile test was ordered but not collected yet.  He was given IV fluids and antiemetics.  He is unsafe to discharge home at this time and will be admitted under the hospitalist service.  He is in stable condition awaiting transport to the floor.    Covid-19  Jim Richard was evaluated during a global COVID-19 pandemic, which necessitated consideration that the patient might be at risk for infection with the SARS-CoV-2 virus that causes COVID-19.   Applicable protocols for evaluation were followed during the patient's care.   COVID-19 was considered as part of the patient's evaluation. The plan for testing is:      Diagnosis:    ICD-10-CM    1. Hypoglycemia  E16.2    2. Failure to thrive in adult  R62.7    3. Alcoholic intoxication without complication (H)  F10.920    4. Diarrhea, unspecified type  R19.7        Scribe Disclosure:  I, Michaela Suarez (trainee) and Andreea Ramon (), am serving as a scribe at 8:35 PM on 8/3/2021 to document services  personally performed by David Capone MD based on my observations and the provider's statements to me.          David Capone MD  08/04/21 0112

## 2021-08-05 ENCOUNTER — APPOINTMENT (OUTPATIENT)
Dept: PHYSICAL THERAPY | Facility: CLINIC | Age: 66
DRG: 981 | End: 2021-08-05
Attending: INTERNAL MEDICINE
Payer: MEDICARE

## 2021-08-05 LAB
ANION GAP SERPL CALCULATED.3IONS-SCNC: 4 MMOL/L (ref 3–14)
BUN SERPL-MCNC: 40 MG/DL (ref 7–30)
CALCIUM SERPL-MCNC: 8.4 MG/DL (ref 8.5–10.1)
CHLORIDE BLD-SCNC: 107 MMOL/L (ref 94–109)
CO2 SERPL-SCNC: 22 MMOL/L (ref 20–32)
CREAT SERPL-MCNC: 2.57 MG/DL (ref 0.66–1.25)
ERYTHROCYTE [DISTWIDTH] IN BLOOD BY AUTOMATED COUNT: 16.5 % (ref 10–15)
ERYTHROCYTE [DISTWIDTH] IN BLOOD BY AUTOMATED COUNT: 16.8 % (ref 10–15)
GFR SERPL CREATININE-BSD FRML MDRD: 25 ML/MIN/1.73M2
GLUCOSE BLD-MCNC: 114 MG/DL (ref 70–99)
HCT VFR BLD AUTO: 23.2 % (ref 40–53)
HCT VFR BLD AUTO: 25.2 % (ref 40–53)
HGB BLD-MCNC: 7.7 G/DL (ref 13.3–17.7)
HGB BLD-MCNC: 8.3 G/DL (ref 13.3–17.7)
HGB BLD-MCNC: 8.5 G/DL (ref 13.3–17.7)
LIPASE SERPL-CCNC: 228 U/L (ref 73–393)
MCH RBC QN AUTO: 30.9 PG (ref 26.5–33)
MCH RBC QN AUTO: 31.8 PG (ref 26.5–33)
MCHC RBC AUTO-ENTMCNC: 32.9 G/DL (ref 31.5–36.5)
MCHC RBC AUTO-ENTMCNC: 33.2 G/DL (ref 31.5–36.5)
MCV RBC AUTO: 94 FL (ref 78–100)
MCV RBC AUTO: 96 FL (ref 78–100)
PLATELET # BLD AUTO: 82 10E3/UL (ref 150–450)
PLATELET # BLD AUTO: 85 10E3/UL (ref 150–450)
POTASSIUM BLD-SCNC: 4.4 MMOL/L (ref 3.4–5.3)
RBC # BLD AUTO: 2.42 10E6/UL (ref 4.4–5.9)
RBC # BLD AUTO: 2.69 10E6/UL (ref 4.4–5.9)
SODIUM SERPL-SCNC: 133 MMOL/L (ref 133–144)
WBC # BLD AUTO: 2.8 10E3/UL (ref 4–11)
WBC # BLD AUTO: 2.9 10E3/UL (ref 4–11)

## 2021-08-05 PROCEDURE — 250N000011 HC RX IP 250 OP 636: Performed by: INTERNAL MEDICINE

## 2021-08-05 PROCEDURE — 250N000013 HC RX MED GY IP 250 OP 250 PS 637: Performed by: INTERNAL MEDICINE

## 2021-08-05 PROCEDURE — 99233 SBSQ HOSP IP/OBS HIGH 50: CPT | Performed by: INTERNAL MEDICINE

## 2021-08-05 PROCEDURE — 80048 BASIC METABOLIC PNL TOTAL CA: CPT | Performed by: INTERNAL MEDICINE

## 2021-08-05 PROCEDURE — 97161 PT EVAL LOW COMPLEX 20 MIN: CPT | Mod: GP

## 2021-08-05 PROCEDURE — 97530 THERAPEUTIC ACTIVITIES: CPT | Mod: GP

## 2021-08-05 PROCEDURE — C9113 INJ PANTOPRAZOLE SODIUM, VIA: HCPCS | Performed by: INTERNAL MEDICINE

## 2021-08-05 PROCEDURE — 85027 COMPLETE CBC AUTOMATED: CPT | Performed by: INTERNAL MEDICINE

## 2021-08-05 PROCEDURE — 210N000002 HC R&B HEART CARE

## 2021-08-05 PROCEDURE — 83690 ASSAY OF LIPASE: CPT | Performed by: INTERNAL MEDICINE

## 2021-08-05 PROCEDURE — 36415 COLL VENOUS BLD VENIPUNCTURE: CPT | Performed by: INTERNAL MEDICINE

## 2021-08-05 PROCEDURE — 85018 HEMOGLOBIN: CPT | Performed by: INTERNAL MEDICINE

## 2021-08-05 PROCEDURE — 258N000003 HC RX IP 258 OP 636: Performed by: INTERNAL MEDICINE

## 2021-08-05 PROCEDURE — 97110 THERAPEUTIC EXERCISES: CPT | Mod: GP

## 2021-08-05 RX ADMIN — VANCOMYCIN HYDROCHLORIDE 125 MG: 125 CAPSULE ORAL at 18:31

## 2021-08-05 RX ADMIN — MIDODRINE HYDROCHLORIDE 10 MG: 5 TABLET ORAL at 11:00

## 2021-08-05 RX ADMIN — FOLIC ACID 1 MG: 1 TABLET ORAL at 10:59

## 2021-08-05 RX ADMIN — PANTOPRAZOLE SODIUM 40 MG: 40 INJECTION, POWDER, FOR SOLUTION INTRAVENOUS at 21:04

## 2021-08-05 RX ADMIN — QUETIAPINE FUMARATE 200 MG: 200 TABLET ORAL at 21:05

## 2021-08-05 RX ADMIN — MIDODRINE HYDROCHLORIDE 10 MG: 5 TABLET ORAL at 18:32

## 2021-08-05 RX ADMIN — OCTREOTIDE ACETATE 50 MCG/HR: 200 INJECTION, SOLUTION INTRAVENOUS; SUBCUTANEOUS at 21:06

## 2021-08-05 RX ADMIN — THIAMINE HCL TAB 100 MG 100 MG: 100 TAB at 11:00

## 2021-08-05 RX ADMIN — ONDANSETRON 4 MG: 2 INJECTION INTRAMUSCULAR; INTRAVENOUS at 06:24

## 2021-08-05 RX ADMIN — NICOTINE 1 PATCH: 14 PATCH, EXTENDED RELEASE TRANSDERMAL at 11:08

## 2021-08-05 RX ADMIN — OXYCODONE HYDROCHLORIDE 5 MG: 5 TABLET ORAL at 21:05

## 2021-08-05 RX ADMIN — PANTOPRAZOLE SODIUM 40 MG: 40 INJECTION, POWDER, FOR SOLUTION INTRAVENOUS at 08:55

## 2021-08-05 RX ADMIN — VORTIOXETINE 10 MG: 10 TABLET, FILM COATED ORAL at 11:00

## 2021-08-05 RX ADMIN — OXYCODONE HYDROCHLORIDE 5 MG: 5 TABLET ORAL at 04:26

## 2021-08-05 RX ADMIN — DISULFIRAM 250 MG: 250 TABLET ORAL at 10:59

## 2021-08-05 RX ADMIN — GABAPENTIN 200 MG: 100 CAPSULE ORAL at 14:11

## 2021-08-05 RX ADMIN — TRAZODONE HYDROCHLORIDE 100 MG: 100 TABLET ORAL at 21:05

## 2021-08-05 RX ADMIN — MIRTAZAPINE 30 MG: 15 TABLET, FILM COATED ORAL at 21:05

## 2021-08-05 RX ADMIN — ONDANSETRON 4 MG: 4 TABLET, ORALLY DISINTEGRATING ORAL at 16:31

## 2021-08-05 RX ADMIN — GABAPENTIN 200 MG: 100 CAPSULE ORAL at 21:05

## 2021-08-05 RX ADMIN — VORTIOXETINE 10 MG: 10 TABLET, FILM COATED ORAL at 21:05

## 2021-08-05 RX ADMIN — MULTIPLE VITAMINS W/ MINERALS TAB 1 TABLET: TAB at 11:00

## 2021-08-05 RX ADMIN — FINASTERIDE 5 MG: 5 TABLET, FILM COATED ORAL at 11:00

## 2021-08-05 RX ADMIN — TAMSULOSIN HYDROCHLORIDE 0.8 MG: 0.4 CAPSULE ORAL at 11:00

## 2021-08-05 RX ADMIN — VANCOMYCIN HYDROCHLORIDE 125 MG: 125 CAPSULE ORAL at 11:01

## 2021-08-05 RX ADMIN — VANCOMYCIN HYDROCHLORIDE 125 MG: 125 CAPSULE ORAL at 21:05

## 2021-08-05 RX ADMIN — OXYCODONE HYDROCHLORIDE 5 MG: 5 TABLET ORAL at 14:11

## 2021-08-05 RX ADMIN — QUETIAPINE FUMARATE 50 MG: 50 TABLET ORAL at 14:48

## 2021-08-05 ASSESSMENT — MIFFLIN-ST. JEOR: SCORE: 1451.22

## 2021-08-05 NOTE — PROGRESS NOTES
08/05/21 1405   Quick Adds   Type of Visit Initial PT Evaluation   Living Environment   People in home alone   Current Living Arrangements house   Home Accessibility stairs to enter home   Number of Stairs, Main Entrance   (12)   Stair Railings, Main Entrance railings safe and in good condition   Number of Stairs, Within Home, Primary 6   Stair Railings, Within Home, Primary railings safe and in good condition   Living Environment Comments Pt reports he lives alone, has 12 stairs to get to his car, ? reliability of information.   Self-Care   Usual Activity Tolerance fair   Current Activity Tolerance poor   Regular Exercise No   Equipment Currently Used at Home walker, rolling   Activity/Exercise/Self-Care Comment Pt reports he uses a walker for ambulation as needed   Disability/Function   Fall history within last six months yes   Number of times patient has fallen within last six months 10   General Information   Onset of Illness/Injury or Date of Surgery 08/03/21   Referring Physician Azul Hannah MD   Patient/Family Therapy Goals Statement (PT) To get better   Pertinent History of Current Problem (include personal factors and/or comorbidities that impact the POC) Pt is 66 year old male adm on 8/3/21 with c/o generalized weakness, diarrhea, and dry heaves. Pt + for c-diff, adm for further management of this as well as acute on chronic anemia and ETOH use. Pt with complex PMH including alcohol abuse/dependence complicated by alcoholic liver disease with ascites, status post multiple paracentesis in the past, hepatorenal syndrome, chronic kidney disease stage IV, history of recent bacteremia due to Enterococcus, hypertension, COPD, tobacco use disorder, depression/anxiety, thrombocytopenia and obstructive sleep apnea.   Existing Precautions/Restrictions fall   Cognition   Orientation Status (Cognition) oriented x 3   Affect/Mental Status (Cognition) WFL   Follows Commands (Cognition) WFL   Pain Assessment    Patient Currently in Pain Yes, see Vital Sign flowsheet  (c/o generalized pain throughout)   Posture    Posture Forward head position;Protracted shoulders   Range of Motion (ROM)   ROM Comment B LE ROM WFL   Strength   Strength Comments Pt generally deconditioned, no focal weakness noted, B LE strength grossly 3/5   Bed Mobility   Comment (Bed Mobility) Pt OOB in chair on arrival, declining going back to bed at this time   Transfers   Transfer Safety Comments Min assist   Gait/Stairs (Locomotion)   Comment (Gait/Stairs) Limited by dizziness   Balance   Balance Comments Needing min assist for standing activities   Clinical Impression   Criteria for Skilled Therapeutic Intervention yes, treatment indicated   PT Diagnosis (PT) Impaired mobility   Influenced by the following impairments Decreased strength, decreased balance, decreased activity tolerance   Functional limitations due to impairments Decreased ability to participate in daily tasks   Clinical Presentation Stable/Uncomplicated   Clinical Presentation Rationale Current presentation, [facility]   Clinical Decision Making (Complexity) low complexity   Therapy Frequency (PT) 5x/week   Predicted Duration of Therapy Intervention (days/wks) 5 days   Planned Therapy Interventions (PT) balance training;bed mobility training;gait training;home exercise program;patient/family education;stair training;strengthening;transfer training   Risk & Benefits of therapy have been explained evaluation/treatment results reviewed;care plan/treatment goals reviewed;risks/benefits reviewed;current/potential barriers reviewed;participants voiced agreement with care plan;participants included;patient   PT Discharge Planning    PT Discharge Recommendation (DC Rec) Transitional Care Facility   PT Rationale for DC Rec Pt is below baseline level of function, is unable to tolerate ambulation household distances at this time, recommend continued inpatient rehab at [facility] to increase functional  mobility, independence, and safety prior to return home.   Total Evaluation Time   Total Evaluation Time (Minutes) 10

## 2021-08-05 NOTE — PROVIDER NOTIFICATION
On arrival from transfer pt with shoes, wallet, no cash, credit card and DL. Clothing and no meds.    Reports bladder fullness, bladder scanned for 443 ml on call paged for str cath orders. Str cath for 450cc

## 2021-08-05 NOTE — PROGRESS NOTES
[facility]    Medicine Progress Note - Hospitalist Service       Date of Admission:  8/3/2021    Assessment & Plan             Jim Richard is a 66-year-old gentleman with a complex past medical history of alcohol abuse/dependence complicated by alcoholic liver disease with ascites, status post multiple paracentesis in the past, hepatorenal syndrome, chronic kidney disease stage IV, history of recent bacteremia due to Enterococcus, hypertension, COPD, tobacco use disorder, depression/anxiety, thrombocytopenia and obstructive sleep apnea, who presented to ER for evaluation of generalized weakness, diarrhea and dry heaves.     Decompensated alcoholic liver cirrhosis with recurrent ascites  Suspected hepato-renal syndrome with CKD stage IV   Patient has been having issues with recurrent ascites requiring multiple paracenteses.  It seems that last paracentesis was on 07/26/2021 when 8.7 liters of fluid was removed before that had paracentesis on 07/2015 with 5.2 liters fluid removed.  It looks like his prior ascites fluid analysis was suggestive of SBP.  Now, he has significant abdominal distention.  I think that some of his symptoms of dry heaves, without shortness of breath are related to his significant abdominal distention.   * Paracentesis (8/4):  Removed over 6 L and given Albumin   - Holding PTA diuretics given diarrhea and possible GI bleed   - Continue to monitor renal function intermittently   - Holding PTA Lactulose due to his diarrhea.  Likely resume tomorrow      C Diff colitis  Patient reports diarrhea the past week.  C diff PCR positive in the ED   8/5:  Patient reports diarrhea significantly improved and last episode was yesterday   - Enteric precautions   - PO Vancomycin   - Continue to monitor stool output     Alcoholism/alcohol dependence  Alcohol intoxication  History of alcohol withdrawals  Patient had a recent hospitalization at [facility] from where  he was discharged to TCU.  After he returned back home, he started drinking again.  He admits he drinks 1 pint of vodka daily.  He stated that he has a .  His ethanol level is 0.23.    - Continue multivitamins, folic acid and thiamine  - CIWA protocol in place  - Psychiatry consulted to consider commitment due to continued ETOH use     Acute on chronic anemia   8/5:  Hemoglobin from yesterday evening down to 6.9. Given chronic liver disease and concern for GI bleed in setting of cirrhosis. Vitamin K given over night and transfused 1 unit of PRBCs   - Continue to monitor.  Hemoglobin back up to 8.3   - Conditional order to transfuse for hgb <7   - Octreotide drip  - IV Protonix BID   - GI following now and appreciate their recommendations     Atypical chest pain   Patient continues to complain of chest pain since arrival to the ED.  Initial troponin negative.  EKG without evidence of acute ischemia  - Repeat troponin   - Continue to monitor      Generalized weakness  Failure to thrive  This is likely multifactorial from his chronic alcohol use, alcoholic liver cirrhosis, poor oral intake, and recent diarrhea.    - PT & /care coordinator consulted    Hypoglycemia    Blood sugars were in the 40s upon arrival in the ER.  This is likely due to poor oral intake.  He was given a couple of doses of D50 with improvement of his blood sugars.    - Hypoglycemia protocol in place  - Monitor for now      Tobacco use disorder  - Nicotine patch had been ordered    Recent Enterococcus bacteremia  He had been treated with ampicillin IV for 2 weeks as per ID recommendations.  He denies any fevers or leukocytosis at this time.    H/o BPH  - PTA Flomax and Proscar    History of COPD  This does not seem to be an acute issue at this time.    - PTA inhalers     History of depression/anxiety  - PTA medications resumed     Obstructive sleep apnea   Noncompliant with CPAP.    Thrombocytopenia, chronic, related to his  chronic liver disease  There is no evidence of active bleeding.  Platelet count on admission was 148.    - We will monitor it periodically         Diet: Clear Liquid Diet    DVT Prophylaxis: Pneumatic Compression Devices  Leger Catheter: Not present  Central Lines: None  Code Status: No CPR- Do NOT Intubate      Disposition Plan   Expected discharge: TBD.  Needs stabilization of his liver issues and psychiatry evaluation for possible commitment.  May warrant TCU      The patient's care was discussed with the Bedside Nurse, Care Coordinator/ and Patient.    Time Spent on this Encounter   Over 35 minutes was spent examining and discussing the patient with greater than 50% time spent in counseling and/or coordination of care.     Godfrey Vasques,   Hospitalist Service  [facility]  Securely message with the Vocera Web Console (learn more here)  Text page via [employer] Paging/Directory      Clinically Significant Risk Factors Present on Admission              # Thrombocytopenia: Plts = 85 10e3/uL (Ref range: 150 - 450 10e3/uL) on admission, will monitor for bleeding  # Severe Malnutrition, POA: based on Weight loss;Reduced intake;Subcutaneous fat loss;Muscle loss;Fluid retention (08/05/21 1000)     ______________________________________________________________________    Interval History   Patient seen and examined.  Last night repeat hemoglobin came back low and patient was transfused 1 unit of PRBCs [place] [facility] [place]  Otherwise patient is somewhat improved. Abdominal pain is improved.  Last episode of diarrhea was yesterday.  No fevers or chills.      Data reviewed today: I reviewed all medications, new labs and imaging results over the last 24 hours. I personally reviewed no images or EKG's today.    Physical Exam   Vital Signs: Temp: 99  F (37.2  C) Temp src: Oral BP: 125/64 Pulse: 75   Resp: 13 SpO2: 96 % O2 Device: None (Room air)    Weight: 158 lbs 3.2 oz  General  Appearance: Resting comfortably.  NAD   Respiratory: Clear to auscultation.  No respiratory distress  Cardiovascular: RRR.  No obvious murmurs  GI: Soft. Minimally distended.  No obvious rashes  Skin: No obvious rashes or cyanosis.  Bruising noted  Other: No edema.  No calf tenderness      Data   Recent Labs   Lab 08/05/21  0532 08/05/21  0314 08/05/21  0008 08/04/21  2215 08/04/21  1635 08/04/21  1427 08/04/21  1352 08/04/21  1153 08/04/21  0929 08/03/21 2017   WBC 2.9*  --  2.8*  --   --   --   --   --  4.5 5.5   HGB 8.3* 8.5* 7.7*  --   --   --    < >  --  7.5* 8.5*   MCV 94  --  96  --   --   --   --   --  96 98   PLT 85*  --  82*  --   --   --   --   --  109* 148*   INR  --   --   --   --   --   --   --   --   --  1.36*     --   --   --   --   --   --   --  133 139   POTASSIUM 4.4  --   --   --   --   --   --   --  4.0 4.0   CHLORIDE 107  --   --   --   --   --   --   --  109 111*   CO2 22  --   --   --   --   --   --   --  18* 14*   BUN 40*  --   --   --   --   --   --   --  41* 43*   CR 2.57*  --   --   --   --   --   --   --  2.27* 2.18*   ANIONGAP 4  --   --   --   --   --   --   --  6 14   KEV 8.4*  --   --   --   --   --   --   --  8.4* 8.3*   *  --   --  99 129*  --   --   --  127* 52*   ALBUMIN  --   --   --   --   --   --   --  2.8*  --  2.9*   PROTTOTAL  --   --   --   --   --   --   --   --   --  7.0   BILITOTAL  --   --   --   --   --   --   --   --   --  1.0   ALKPHOS  --   --   --   --   --   --   --   --   --  176*   ALT  --   --   --   --   --   --   --   --   --  17   AST  --   --   --   --   --   --   --   --   --  43   LIPASE 228  --   --   --   --   --   --   --   --  604*   TROPONIN  --   --   --   --   --  <0.015  --   --   --  0.016    < > = values in this interval not displayed.     Recent Results (from the past 24 hour(s))   US Paracentesis    Narrative    US PARACENTESIS 8/4/2021 4:20 PM    CLINICAL HISTORY: HIGH VOLUME paracentesis with or without diagnostic  fluid  analysis with labs to be drawn if ordered. Total paracentesis  volume as much as possible.    PROCEDURE: Informed consent obtained. Time out performed. The abdomen  was prepped and draped in a sterile fashion. 10 mL of 1% lidocaine was  infused into local soft tissues. An 8French catheter system was  introduced into the abdominal ascites under ultrasound guidance.    6.7 liters of clear yellow fluid were removed and sent to lab if  requested.    Patient tolerated procedure well.    Ultrasound imaging was obtained and placed in the patient's permanent  medical record.      Impression    IMPRESSION:  1.  Status post ultrasound-guided paracentesis.    MACKENZIE DYE MD         SYSTEM ID:  [id number]

## 2021-08-05 NOTE — PLAN OF CARE
Shift: 4610-1350    Neuro/Orientation: alert and oriented x4, lethargic at times    Tele/Cardiac: Sinus Rhythm    Abnormal Labs: Cr 2.57, WBC 2.9, Hgb 8.3, plt 85    Vitals: Temp: 98.5  F (36.9  C) Temp src: Oral BP: 138/88 Pulse: 74   Resp: 14 SpO2: 94 % O2 Device: None (Room air)       Pain: left upper abdomen, oxy PRN    Access/Drips: octreotide drip    Respiratory/Oxygen: lungs diminished; room air    GI/: intermittent straight cath and bladder scanning; no BMs this shift  -Diet: regular    Skin: scattered scabs; excoriated buttocks and scrotum    Mobility: up with 1    Discharge Planning: pending    Aggression tool: green    Updates:   -1 unit of RBC overnight  -Worked with therapy today  -Unable to void. Last straight cath @ 14:00  -Ate better this evening

## 2021-08-05 NOTE — CONSULTS
"[facility]  Gastroenterology Consultation         Jim Richard  [address] [facility] [id number]  66 year old male    Admission Date/Time: 8/3/2021  Primary Care Provider: Talon Elias  Referring / Attending Physician:  Dr. Raul Lim    We were asked to see the patient in consultation by Dr. Raul Lim for evaluation of anemia and liver cirrhosis.      CC: diarrhea and abdominal weakness    HPI:   Jim Richard is a 66 year old male who has a past medical history of numerous admissions for alcohol intoxication complicated by decompensated alcoholic liver cirrhosis with esophageal varices, abdominal ascites, GI bleed, hepatic encephalopathy and hepatorenal syndrome as well as depression, anxiety, sleep apnea, COPD and anemia. He has been admitted with c/o diarrhea, weakness and failure to thrive. He was recently admitted from 06/25/2021-07/14/2021.  At that time, he was treated for recurrent ascites with multiple paracentesis. He has been drinking 1 pint of vodka daily. He c/o abdominal distention, nausea, and dry heaving with no noted blood. He does report \"watery black stools like coke\"    At a prior admission was admitted to sober living facility and possibly kicked out and has been drinking 1 pt of vodka daily over last month. He has been non compliant with medications. He failed to f/u in the outpatient setting. He has been admitted numerous times in last year with multiple ED visits.  Last EGD 01/2021 noted grade I esophageal varices. Last paracentesis 8/3 and removed 6.7 L of clear fluid.     Temp 99.0, Creat 2.57 (around his recent baselin), WBC 2.9, Hgb 6.9_>7.7->8.5->8.3, platelets 85K, and LFT's normal except Alk Phos 176, Tbili 1.0, Lipase 604 and ETOH 0.23.COVID negative. C difficile positive.    ROS: A comprehensive ten point review of systems was negative aside from those in mentioned in the HPI.      PAST MED HX:  I have reviewed " this patient's medical history and updated it with pertinent information if needed.   Past Medical History:   Diagnosis Date     Alcoholism (H) 1/14/2013    had treatment at [facility]     Anxiety      COPD (chronic obstructive pulmonary disease) (H)      Coronary artery disease 09/09/2014    Nuclear stress test with slight fixed defect inferiorly, no ischemia     Depression     w/anxiety     Esophageal varices with bleeding 04/28/2018    6 varices banded on EGD     Heart attack (H)      Hepatomegaly     Fatty liver, chronic alcoholic     Hyperlipemia      Hypertension      JANIS on CPAP      Peripheral vascular disease (H)      PVD (peripheral vascular disease) (H)      SDH (subdural hematoma) (H) 04/26/2018    Right side, resolved spontaneously     Spinal stenosis      Substance abuse (H)        MEDICATIONS:   Prior to Admission Medications   Prescriptions Last Dose Informant Patient Reported? Taking?   QUEtiapine (SEROQUEL) 200 MG tablet Past Week at Unknown time Self Yes Yes   Sig: Take 200 mg by mouth At Bedtime Filled 1/11/21 #30    QUEtiapine (SEROQUEL) 50 MG tablet as needed Self No Yes   Sig: Take 1 tablet (50 mg) by mouth 3 times daily as needed (anxiety) Filled 11/20/20 #30   albuterol (PROAIR HFA/PROVENTIL HFA/VENTOLIN HFA) 108 (90 Base) MCG/ACT inhaler as needed Self No Yes   Sig: Inhale 2 puffs into the lungs every 4 hours as needed for shortness of breath / dyspnea or wheezing   disulfiram (ANTABUSE) 250 MG tablet Past Week at Unknown time Self No Yes   Sig: Take 1 tablet (250 mg) by mouth daily   finasteride (PROSCAR) 5 MG tablet Past Week at Unknown time Self No Yes   Sig: Take 1 tablet (5 mg) by mouth daily   fluticasone-vilanterol (BREO ELLIPTA) 200-25 MCG/INH inhaler as needed Self No Yes   Sig: Inhale 1 puff into the lungs daily as needed (SOB)   gabapentin (NEURONTIN) 100 MG capsule Past Week at Unknown time Self No Yes   Sig: Take 2 capsules (200 mg) by mouth 3 times daily   hydrOXYzine  (ATARAX) 50 MG tablet Past Week at Unknown time  Yes Yes   Sig: Take 50 mg by mouth 3 times daily as needed    lactulose (CHRONULAC) 10 GM/15ML solution Past Week at Unknown time Self No Yes   Sig: Take 15 mLs (10 g) by mouth 2 times daily   miconazole (MICATIN) 2 % external powder Past Week at Unknown time Self No Yes   Sig: Apply topically 2 times daily   midodrine (PROAMATINE) 10 MG tablet Past Week at Unknown time Self No Yes   Sig: Take 1 tablet (10 mg) by mouth 3 times daily (with meals)   mirtazapine (REMERON) 30 MG tablet Past Week at Unknown time Self Yes Yes   Sig: Take 30 mg by mouth At Bedtime Filled 1/11/21 for #30   multivitamin w/minerals (THERA-VIT-M) tablet Past Week at Unknown time Self No Yes   Sig: Take 1 tablet by mouth daily   ondansetron (ZOFRAN-ODT) 4 MG ODT tab as needed Self No Yes   Sig: Take 1 tablet (4 mg) by mouth every 6 hours as needed for nausea or vomiting   oxyCODONE (ROXICODONE) 5 MG tablet Past Month at Unknown time Self No Yes   Sig: Take 0.5 tablets (2.5 mg) by mouth every 6 hours as needed for moderate to severe pain   pantoprazole (PROTONIX) 40 MG EC tablet Past Week at Unknown time Self No Yes   Sig: Take 1 tablet (40 mg) by mouth 2 times daily   spironolactone (ALDACTONE) 25 MG tablet Past Week at Unknown time Self No Yes   Sig: Take 1 tablet (25 mg) by mouth 2 times daily   tamsulosin (FLOMAX) 0.4 MG capsule Past Week at Unknown time Self No Yes   Sig: Take 2 capsules (0.8 mg) by mouth daily   traZODone (DESYREL) 100 MG tablet Past Week at Unknown time Self No Yes   Sig: Take 1 tablet (100 mg) by mouth At Bedtime   vortioxetine (TRINTELLIX) 10 MG tablet Past Week at Unknown time Self Yes Yes   Sig: Take 10 mg by mouth 2 times daily      Facility-Administered Medications: None       ALLERGIES:   Allergies   Allergen Reactions     Amlodipine Swelling     Lisinopril      Other reaction(s): Angioedema  Mouth and tongue swelling   Mouth and tongue swelling          SOCIAL  HISTORY:  Social History     Tobacco Use     Smoking status: Current Every Day Smoker     Packs/day: 1.00     Types: Cigarettes     Smokeless tobacco: Never Used     Tobacco comment: Chantix caused nightmares   Substance Use Topics     Alcohol use: Not Currently     Comment: 1 pint of vodka a day     Drug use: No       FAMILY HISTORY:  Family History   Problem Relation Age of Onset     Mental Illness Son      Coronary Artery Disease Early Onset Mother      Substance Abuse Father      Lung Cancer Father      Substance Abuse Brother      Unknown/Adopted No family hx of      Depression No family hx of      Anxiety Disorder No family hx of      Schizophrenia No family hx of      Bipolar Disorder No family hx of      Suicide No family hx of      Dementia No family hx of      Boyd Disease No family hx of      Parkinsonism No family hx of      Autism Spectrum Disorder No family hx of      Intellectual Disability (Mental Retardation) No family hx of        PHYSICAL EXAM:   General  Unable to wake, sleeping comfortably  Vital Signs with Ranges  Temp: 99  F (37.2  C) Temp src: Oral BP: 125/64 Pulse: 75   Resp: 13 SpO2: 96 % O2 Device: None (Room air)    I/O last 3 completed shifts:  In: 699 [P.O.:60]  Out: 450 [Urine:450]    Constitutional: healthy, alert and no distress   Cardiovascular: negative, PMI normal. No lifts, heaves, or thrills. RRR. No murmurs, clicks gallops or rub  Respiratory: negative, Percussion normal. Good diaphragmatic excursion. Lungs clear  Abdomen: Abdomen soft, non-tender. BS normal. No masses, organomegaly, positive findings: tenderness mild generalized          ADDITIONAL COMMENTS:   I reviewed the patient's new clinical lab test results.   Recent Labs   Lab Test 08/05/21  0532 08/05/21  0314 08/05/21  0008 08/04/21  0929 08/03/21 2017 06/27/21  0803 06/25/21  1931 06/06/21  0600   WBC 2.9*  --  2.8* 4.5 5.5   < > 7.6 3.6*   HGB 8.3* 8.5* 7.7* 7.5* 8.5*   < > 8.4* 7.4*   MCV 94  --  96 96 98    < > 96 100   PLT 85*  --  82* 109* 148*   < > 231 111*   INR  --   --   --   --  1.36*  --  1.24* 1.31*    < > = values in this interval not displayed.     Recent Labs   Lab Test 08/05/21  0532 08/04/21  0929 08/03/21 2017   POTASSIUM 4.4 4.0 4.0   CHLORIDE 107 109 111*   CO2 22 18* 14*   BUN 40* 41* 43*   ANIONGAP 4 6 14     Recent Labs   Lab Test 08/05/21  0532 08/04/21  1153 08/03/21  2325 08/03/21  2017 07/10/21  0714 07/07/21  0420 07/06/21  0732 07/04/21  0809 06/16/21  1123 06/13/21  1150 04/04/21  1017 03/26/21  2131   ALBUMIN  --  2.8*  --  2.9* 2.8*  --  2.9* 2.9*  --   --    < > 2.7*   BILITOTAL  --   --   --  1.0  --   --  0.5 1.0   < >  --    < > 2.2*   ALT  --   --   --  17  --   --  20 22   < >  --    < > 26   AST  --   --   --  43  --   --  21 26   < >  --    < > 36   PROTEIN  --   --  100 *  --   --  Negative  --   --   --  Negative   < >  --    LIPASE 228  --   --  604*  --   --   --   --   --   --   --  444*    < > = values in this interval not displayed.       I reviewed the patient's new imaging results.        CONSULTATION ASSESSMENT AND PLAN:     Jim Richard is a pleasant 66 year old male with alcoholic liver cirrhosis complicated with esophageal varices, abdominal ascites, hepatorenal syndrome, pancytopenia and c/o abdominal pain. GI consulted on 6/7/21.     Active Problems:  Alcoholic liver cirrhosis  Alcholic intoxication  Elevated Creatinine- hepatorenal syndrome  Abdominal pain  Abdominal ascites- concern for SBP  Hypoalbuminemia  Pancytopenia  Patient has suffered from chronic alcoholism and developed significant complications with decompensated liver cirrhosis. He has required paracentesis every 2 weeks. Has had elevating creatinine- currently 2.57. He has type 2 hepatorenal syndrome. Has pancytopenia due to bone marrow suppression.  Albumin 2.8.Tbili 1.0,   Platelets 85 Hemoglobin 6.9->7.7->8.5->8.3  MELD score 16  There is a significant concern that patient has SBP. Has  paracentesis scheduled today.  Last paracentesis done 8/3 and removed 6.7 L of fluid     -- Hemoglobin stable no plans for endoscopic procedure at this time  -- Continue midodrine and pantoprazole  -- Consider TIPS procedure in near future  -- Monitor CBC and BMP  -- Clear liquid diet and can advance as tolerated, encourage good nutrition    C Difficile diarrhea  Continue vancomycin 125 mg QID  This is source of many of his symptoms       [facility].  Office: [phone]  Cell : [phone] (Dr. Shaw)  Cell: [phone] (Gloria De Leon PA-C)

## 2021-08-05 NOTE — PLAN OF CARE
Pt A x O x 4. Reports L abd pain, received oxycodone x1. Oxygen saturation >90% on RA. SR on tele. Zofran x1 for nausea. Octreotide gtt 10 ml/hr. Received 1U PRBC's, recheck 8.5. Q2 turns. CIWA 3 and 1. NPO for possible endoscopy in AM. Pt educated on poc, cares, medications and safety. Will continue to monitor.

## 2021-08-05 NOTE — PROGRESS NOTES
"SPIRITUAL HEALTH SERVICES Progress Note  [facility]    Brief visit with pt, per request in chart.  Pt was sleepy, and said he's had a rough few days.  He said mostly what he wants now \"is a steak and something to drink.\"  Provided supportive listening and conversation, and assisted in contacting staff to place a meal order.   team available per additional pt request.                                                                                                                                                   Patricia Gaffney M.A.  Staff   Phone [phone]      "

## 2021-08-05 NOTE — PROGRESS NOTES
[id number]: Pt HGB at [facility] was 6.9. Hospitalist updated, New orders for 1 unit PRB and IMC status. Transfer to .

## 2021-08-05 NOTE — CONSULTS
Treating Kidney Stones: Ureteroscopic Stone Removal    Ureteroscopic stone removal may be done before, after, or instead of other treatments. If you need this procedure, your doctor will discuss its risks and possible complications. You will be told how to prepare. And you will be told about anesthesia that will keep you pain-free during treatment.     A ureteroscope lets your doctor see your stone before removing it.   Removing the stone through the ureter  Ureteroscopic stone removal extracts a small stone in your ureter without an incision. Your doctor places a viewing tube (ureteroscope) in your ureter. A wire basket inserted through the tube removes the stone. Sometimes, a laser or a mechanical device is used to break up the stone. A soft tube may be left in your ureter briefly to drain urine.     The stone may be fragmented. The stone is then withdrawn or passed.   Your recovery  This is an outpatient or overnight procedure. For a few days after surgery, you may feel some pain when you urinate. Or you may need to urinate more often, or have bloody urine. You may have a ureteral stent. This is a soft tube that prevents blockage from swelling after the procedure. The stent is removed when the swelling goes down, often within days. Follow up as instructed to check for any new stones.  When to call your doctor  Call your doctor right away if:  · You have sudden pain or flank pain.  · You have a fever over 100.4°F (38°C).  · You have nausea that lasts for days.  · You have heavy bleeding when you urinate.  · You have heavy bleeding through your drainage tube.  · You have swelling or redness around your incision.   © 1015-1473 [employer] [address] [place] [address], [place] [address]. All rights reserved. This information is not intended as a substitute for professional medical care. Always follow your healthcare professional's instructions.         Jim is a pleasant 66-year-old gentleman well-known to GI service with multiple hospitalization due to chronic liver disease decompensated cirrhosis ascites chronic kidney disease hepatorenal syndrome patient has been evaluated in the past for possible TIPS who has been in and out of the hospital now readmitted with diarrhea C. difficile colitis positive C. difficile on stool studies patient dropped hemoglobin today from 8.5-6.9 range.  There is no clinical signs of overt bleeding.  I will recommend to continue on supportive care serial hemoglobins.  Repeat labs again in the morning.  Possible repeat upper GI endoscopy.  N.p.o. for now.  Finding and plan discussed with Dr. Lim.    Rosendo Shaw MD

## 2021-08-05 NOTE — CONSULTS
CLINICAL NUTRITION SERVICES  -  ASSESSMENT NOTE      Future/Additional Recommendations: Ensure BID between meals once diet advanced (patient took last admit)   Malnutrition: % Weight Loss:  > 5% in 1 month (severe malnutrition)  % Intake:  </= 50% for >/= 5 days (severe malnutrition)(most likely)  Subcutaneous Fat Loss:  Orbital region moderate depletion and Upper arm region severe depletion  Muscle Loss:  Temporal region moderate depletion and Clavicle bone region moderate-severe depletion  Fluid Retention:  Mild 1+ as above     Malnutrition Diagnosis: Severe malnutrition  In Context of:  Acute illness or injury  Chronic illness or disease  Environmental or social circumstances        REASON FOR ASSESSMENT  Jim Richard is a 66 year old male seen by Registered Dietitian for Admission Nutrition Risk Screen for Have you recently lost weight without trying? - Unsure   Have you eaten poorly because of a decreased appetite? - Yes     Patient admitted with ETOH intoxication (drinking 1 pint of Vodka per day) and FTT.  He is very well known to our services due to frequent admits.    NUTRITION HISTORY  - Information obtained from [facility] as patient was sleeping upon my arrival and did not wake to name.  Patient was last seen by me on 7/2/21 and that time was eating two meals per day at home and noted that he eats better in the hospital than at home due to his ETOH dependence.  He was eating well during the last hospitalization (100% of meals) and was ordered full meals + receiving supplements (4oz Ensure BID between meals).  His weight typically runs between 170-190# due to fluids and frequent need for paracentesis.   - Noted that after last hospitalization (6/25-7/14), he was discharged to TCU and then once he got home he resumed drinking.  RN notes on admit indicated that he was not eating for a few days prior to admit, was not taking meds, and was not bathing.  EMS had also reported that several MOW packages were  "piled up on his doorstep.  He complained of weakness, poor appetite, abdominal distension, and diarrhea on admit.  Cdiff pending.       CURRENT NUTRITION ORDERS  Diet Order:     NPO     Current Intake/Tolerance:  N/A      NUTRITION FOCUSED PHYSICAL ASSESSMENT FOR DIAGNOSING MALNUTRITION)  Yes - Visual only as patient curled up sleeping under covers              Observed:    Muscle wasting (refer to documentation in Malnutrition section) and Subcutaneous fat loss (refer to documentation in Malnutrition section)    Obtained from Chart/Interdisciplinary Team:  Disheveled   Edema 1+ (trace) in LE     ANTHROPOMETRICS  Height: 5' 7\"  Weight: 71.8 kg (158#)(8/5)  Body mass index is 24.78 kg/m   Weight Status:  Normal BMI  IBW: 67.3 kg   % IBW: 107%  Weight History:   Wt Readings from Last 10 Encounters:   08/05/21 71.8 kg (158 lb 3.2 oz)   07/30/21 83.9 kg (185 lb)   07/27/21 83.9 kg (185 lb)   07/14/21 84.5 kg (186 lb 4.8 oz)   06/17/21 81.1 kg (178 lb 11.2 oz)   05/07/21 86.2 kg (190 lb)   05/01/21 86.2 kg (190 lb)   03/26/21 86.2 kg (190 lb)   03/16/21 78.4 kg (172 lb 12.8 oz)   03/10/21 86.2 kg (190 lb)     Patient has had significant weight loss (likely both a combination of water loss + fat and muscle loss) --> down 28# or 15% over the last 3 weeks     LABS  BUN 40 (H), Cr 2.57 (H) - Stage IV CKD     MEDICATIONS  Folic Acid, MVI-M, and Thiamine per ETOH Protocol       ASSESSED NUTRITION NEEDS PER APPROVED PRACTICE GUIDELINES:    Dosing Weight 71.8 kg   Estimated Energy Needs: 8216-5451 kcals (25-30 Kcal/Kg)  Justification: maintenance  Estimated Protein Needs:  grams protein (1.2-1.5 g pro/Kg)  Justification: hypercatabolism with acute illness and CKD  Estimated Fluid Needs: 9153-8345 mL (1 mL/Kcal)  Justification: maintenance    MALNUTRITION:  % Weight Loss:  > 5% in 1 month (severe malnutrition)  % Intake:  </= 50% for >/= 5 days (severe malnutrition)(most likely)  Subcutaneous Fat Loss:  Orbital region " moderate depletion and Upper arm region severe depletion  Muscle Loss:  Temporal region moderate depletion and Clavicle bone region moderate-severe depletion  Fluid Retention:  Mild 1+ as above     Malnutrition Diagnosis: Severe malnutrition  In Context of:  Acute illness or injury  Chronic illness or disease  Environmental or social circumstances    NUTRITION DIAGNOSIS:  Inadequate protein-energy intake related to NPO for procedures today as evidenced by meeting 0% needs       NUTRITION INTERVENTIONS  Recommendations / Nutrition Prescription  ADAT per MD discretion   Ensure BID between meals once diet advanced (patient took last admit)    Implementation  Nutrition education: Not appropriate at this time due to patient condition    Nutrition Goals  Diet will advance within the next 48 hours and patient will consume at least 50% meals and supplements     MONITORING AND EVALUATION:  Progress towards goals will be monitored and evaluated per protocol and Practice Guidelines    Nesha Muñoz RD, LD, CNSC   Clinical Dietitian - [facility]

## 2021-08-05 NOTE — PLAN OF CARE
Neuro:not scoring on CIWAA, generalized back pain and very sensitive to touch on all parts of body  CV/Rhythm:SR  Resp/02:RA  GI/Diet:rounded, distended irregular contour, umbilical hernia  :unable to void on arrival to unit. Str cath for 450 ml, monitor  Skin/Incisions/Sites:perinuem red - barrier cream applied, buttocks blanchable and excoriated mepilex and turn, scab and bruising to BUE L greater than right, mepilex to L fA scab  Pulses/CMS:intact  Edema:BLE 1+  Activity/Falls Risk:turned q2hr, falls at home, bed alarm  Lines/Drains/IVs:PIV x 2, one infiltrated with Vit K, new placed for blood and ocretotide gtt  Labs/BGM:BGM 99, hgb 6.9 1 L PRBC infusing  Test/Procedures:US pericentesis today  VS/Pain:stable, 6-8/10 pain back, generalized  DC Plan:social work and OT  Other:DNR/DNI, 6 L removed in US today, RLQ cdi

## 2021-08-05 NOTE — PROGRESS NOTES
Brief progress note: Paged regarding hemoglobin of 6.9.    Patient with decompensated cirrhosis, new diagnosis C. difficile.  Hemoglobin on admission early this a.m. was 8.5, now 6.9.    Is on Protonix twice daily.  He reports 3 bowel movements overnight, 1 over the course of today.  He is nauseous, though has not had any episodes of emesis.  He describes his stool as the color of [employer].  No overt bleeding has been mentioned by nursing staff.  Uncertain as to reason for 6 PM hemoglobin check, though dropping.    Had greater than 6 L off paracentesis this afternoon which was clear and not grossly bloody.  Has abdominal pain, though abdominal pain has not worsened since paracentesis, was the same discomfort he was experiencing prior to large-volume paracentesis.    Given history of cirrhosis with worsening anemia, thrombocytopenia will monitor closely as patient is at higher risk for acute rapid GI blood loss.  Lower suspicion for variceal bleed as no overt bleeding has been noted and stool output has slowed    Chronically ill-appearing 66-year-old male laying in bed.  He is in no acute distress, though appears fatigued.  Abdomen with ascites, though not tense.  Umbilical hernia is soft, though difficult to reduce.  Diffuse tenderness palpation of abdomen, though discomfort improves with rapid release of palpation rather than worsens.    Switch patient to [facility] status overnight to monitor closely  Updated Dr. Shaw of gastroenterology, consult placed.  Again, he does not appear to be rapidly bleeding  We will keep patient n.p.o. overnight in case evidence of rapid bleed, possible scope tomorrow as patient might be oozing  5 mg of vitamin K to correct any potential nutritional deficiency with poor intake and diarrhea.  INR of 1.36, though anticipate this is largely related to synthetic dysfunction  Met with patient, consented him for blood products.  Transfusing 1 unit packed red blood cells now  Repeat CBC at  midnight, 6 AM    Raul Lim MD  8:09 PM

## 2021-08-06 ENCOUNTER — APPOINTMENT (OUTPATIENT)
Dept: PHYSICAL THERAPY | Facility: CLINIC | Age: 66
DRG: 981 | End: 2021-08-06
Payer: MEDICARE

## 2021-08-06 LAB
ANION GAP SERPL CALCULATED.3IONS-SCNC: 5 MMOL/L (ref 3–14)
BUN SERPL-MCNC: 39 MG/DL (ref 7–30)
CALCIUM SERPL-MCNC: 8.2 MG/DL (ref 8.5–10.1)
CHLORIDE BLD-SCNC: 107 MMOL/L (ref 94–109)
CO2 SERPL-SCNC: 20 MMOL/L (ref 20–32)
CREAT SERPL-MCNC: 3.13 MG/DL (ref 0.66–1.25)
ERYTHROCYTE [DISTWIDTH] IN BLOOD BY AUTOMATED COUNT: 16.2 % (ref 10–15)
GFR SERPL CREATININE-BSD FRML MDRD: 20 ML/MIN/1.73M2
GLUCOSE BLD-MCNC: 164 MG/DL (ref 70–99)
HCT VFR BLD AUTO: 26.2 % (ref 40–53)
HGB BLD-MCNC: 8.6 G/DL (ref 13.3–17.7)
INR PPP: 1.39 (ref 0.85–1.15)
MCH RBC QN AUTO: 31 PG (ref 26.5–33)
MCHC RBC AUTO-ENTMCNC: 32.8 G/DL (ref 31.5–36.5)
MCV RBC AUTO: 95 FL (ref 78–100)
PLATELET # BLD AUTO: 78 10E3/UL (ref 150–450)
POTASSIUM BLD-SCNC: 4.5 MMOL/L (ref 3.4–5.3)
RBC # BLD AUTO: 2.77 10E6/UL (ref 4.4–5.9)
SODIUM SERPL-SCNC: 132 MMOL/L (ref 133–144)
WBC # BLD AUTO: 2.8 10E3/UL (ref 4–11)

## 2021-08-06 PROCEDURE — 82374 ASSAY BLOOD CARBON DIOXIDE: CPT | Performed by: PHYSICIAN ASSISTANT

## 2021-08-06 PROCEDURE — 99222 1ST HOSP IP/OBS MODERATE 55: CPT | Performed by: PSYCHIATRY & NEUROLOGY

## 2021-08-06 PROCEDURE — 250N000011 HC RX IP 250 OP 636: Performed by: INTERNAL MEDICINE

## 2021-08-06 PROCEDURE — 258N000003 HC RX IP 258 OP 636: Performed by: INTERNAL MEDICINE

## 2021-08-06 PROCEDURE — 250N000013 HC RX MED GY IP 250 OP 250 PS 637: Performed by: INTERNAL MEDICINE

## 2021-08-06 PROCEDURE — C9113 INJ PANTOPRAZOLE SODIUM, VIA: HCPCS | Performed by: INTERNAL MEDICINE

## 2021-08-06 PROCEDURE — 97530 THERAPEUTIC ACTIVITIES: CPT | Mod: GP | Performed by: PHYSICAL THERAPIST

## 2021-08-06 PROCEDURE — 210N000002 HC R&B HEART CARE

## 2021-08-06 PROCEDURE — 85610 PROTHROMBIN TIME: CPT | Performed by: PHYSICIAN ASSISTANT

## 2021-08-06 PROCEDURE — 85027 COMPLETE CBC AUTOMATED: CPT | Performed by: PHYSICIAN ASSISTANT

## 2021-08-06 PROCEDURE — 99233 SBSQ HOSP IP/OBS HIGH 50: CPT | Performed by: INTERNAL MEDICINE

## 2021-08-06 PROCEDURE — 36415 COLL VENOUS BLD VENIPUNCTURE: CPT | Performed by: PHYSICIAN ASSISTANT

## 2021-08-06 PROCEDURE — 99207 PR CONSULT E&M CHANGED TO INITIAL LEVEL: CPT | Performed by: PSYCHIATRY & NEUROLOGY

## 2021-08-06 RX ORDER — LACTULOSE 10 G/15ML
10 SOLUTION ORAL 2 TIMES DAILY
Status: DISCONTINUED | OUTPATIENT
Start: 2021-08-06 | End: 2021-08-10

## 2021-08-06 RX ADMIN — VORTIOXETINE 10 MG: 10 TABLET, FILM COATED ORAL at 21:05

## 2021-08-06 RX ADMIN — PANTOPRAZOLE SODIUM 40 MG: 40 INJECTION, POWDER, FOR SOLUTION INTRAVENOUS at 21:04

## 2021-08-06 RX ADMIN — ONDANSETRON 4 MG: 2 INJECTION INTRAMUSCULAR; INTRAVENOUS at 13:33

## 2021-08-06 RX ADMIN — THIAMINE HCL TAB 100 MG 100 MG: 100 TAB at 08:58

## 2021-08-06 RX ADMIN — MIRTAZAPINE 30 MG: 15 TABLET, FILM COATED ORAL at 21:05

## 2021-08-06 RX ADMIN — VORTIOXETINE 10 MG: 10 TABLET, FILM COATED ORAL at 08:58

## 2021-08-06 RX ADMIN — VANCOMYCIN HYDROCHLORIDE 125 MG: 125 CAPSULE ORAL at 21:05

## 2021-08-06 RX ADMIN — HYDROXYZINE HYDROCHLORIDE 50 MG: 25 TABLET ORAL at 01:54

## 2021-08-06 RX ADMIN — MULTIPLE VITAMINS W/ MINERALS TAB 1 TABLET: TAB at 08:58

## 2021-08-06 RX ADMIN — GABAPENTIN 200 MG: 100 CAPSULE ORAL at 21:05

## 2021-08-06 RX ADMIN — PANTOPRAZOLE SODIUM 40 MG: 40 INJECTION, POWDER, FOR SOLUTION INTRAVENOUS at 08:57

## 2021-08-06 RX ADMIN — VANCOMYCIN HYDROCHLORIDE 125 MG: 125 CAPSULE ORAL at 12:35

## 2021-08-06 RX ADMIN — FINASTERIDE 5 MG: 5 TABLET, FILM COATED ORAL at 08:58

## 2021-08-06 RX ADMIN — NICOTINE 1 PATCH: 14 PATCH, EXTENDED RELEASE TRANSDERMAL at 08:57

## 2021-08-06 RX ADMIN — OCTREOTIDE ACETATE 50 MCG/HR: 200 INJECTION, SOLUTION INTRAVENOUS; SUBCUTANEOUS at 21:17

## 2021-08-06 RX ADMIN — QUETIAPINE FUMARATE 200 MG: 200 TABLET ORAL at 21:05

## 2021-08-06 RX ADMIN — TRAZODONE HYDROCHLORIDE 100 MG: 100 TABLET ORAL at 21:05

## 2021-08-06 RX ADMIN — OXYCODONE HYDROCHLORIDE 5 MG: 5 TABLET ORAL at 14:51

## 2021-08-06 RX ADMIN — MIDODRINE HYDROCHLORIDE 10 MG: 5 TABLET ORAL at 08:58

## 2021-08-06 RX ADMIN — DISULFIRAM 250 MG: 250 TABLET ORAL at 08:58

## 2021-08-06 RX ADMIN — VANCOMYCIN HYDROCHLORIDE 125 MG: 125 CAPSULE ORAL at 08:58

## 2021-08-06 RX ADMIN — MIDODRINE HYDROCHLORIDE 10 MG: 5 TABLET ORAL at 12:35

## 2021-08-06 RX ADMIN — OXYCODONE HYDROCHLORIDE 5 MG: 5 TABLET ORAL at 08:57

## 2021-08-06 RX ADMIN — LACTULOSE 10 G: 20 SOLUTION ORAL at 21:05

## 2021-08-06 RX ADMIN — TAMSULOSIN HYDROCHLORIDE 0.8 MG: 0.4 CAPSULE ORAL at 08:57

## 2021-08-06 RX ADMIN — FOLIC ACID 1 MG: 1 TABLET ORAL at 08:58

## 2021-08-06 RX ADMIN — OXYCODONE HYDROCHLORIDE 5 MG: 5 TABLET ORAL at 21:05

## 2021-08-06 RX ADMIN — QUETIAPINE FUMARATE 50 MG: 50 TABLET ORAL at 14:51

## 2021-08-06 RX ADMIN — GABAPENTIN 200 MG: 100 CAPSULE ORAL at 08:58

## 2021-08-06 RX ADMIN — ONDANSETRON 4 MG: 2 INJECTION INTRAMUSCULAR; INTRAVENOUS at 07:45

## 2021-08-06 NOTE — PLAN OF CARE
A/Ox4. Oxygen appropriate on room air. Tele: sinus. No BM this shift. Heavy assist with walker and gait belt. Groin excoriated and generalized scabbing throughout skin. Received oxy x1 this shift. Continues of octeotride gtt

## 2021-08-06 NOTE — PROGRESS NOTES
[facility]    Medicine Progress Note - Hospitalist Service       Date of Admission:  8/3/2021    Assessment & Plan                     Jim Richard is a 66-year-old gentleman with a complex past medical history of alcohol abuse/dependence complicated by alcoholic liver disease with ascites, status post multiple paracentesis in the past, hepatorenal syndrome, chronic kidney disease stage IV, history of recent bacteremia due to Enterococcus, hypertension, COPD, tobacco use disorder, depression/anxiety, thrombocytopenia and obstructive sleep apnea, who presented to ER for evaluation of generalized weakness, diarrhea and dry heaves.     Decompensated alcoholic liver cirrhosis with recurrent ascites  Patient has been having issues with recurrent ascites requiring multiple paracenteses.  It seems that last paracentesis was on 07/26/2021 when 8.7 liters of fluid was removed before that had paracentesis on 07/2015 with 5.2 liters fluid removed.  It looks like his prior ascites fluid analysis was suggestive of SBP.  Now, he has significant abdominal distention.  I think that some of his symptoms of dry heaves, without shortness of breath are related to his significant abdominal distention.   MELD-Na score: 22 at 8/5/2021  5:32 AM  MELD score: 19 at 8/5/2021  5:32 AM  Calculated from:  Serum Creatinine: 2.57 mg/dL at 8/5/2021  5:32 AM  Serum Sodium: 133 mmol/L at 8/5/2021  5:32 AM  Total Bilirubin: 1.0 mg/dL at 8/3/2021  8:17 PM  INR(ratio): 1.36 at 8/3/2021  8:17 PM  Age: 66 years  * Paracentesis (8/4):  Removed over 6 L and given Albumin   - Holding PTA diuretics given diarrhea and possible GI bleed   - Resumed PTA Lactulose   - GI following and appreciate their recommendations     Suspected hepato-renal syndrome with CKD stage IV   Cr function fluctuates in the 2s.  Was 2.18 on admission but gradually worsening.  Now up to 3.13.    - Continue to monitor renal function   - Holding  Spironolactone for now   - If renal function continues to worsen may need to consider nephrology consultation    C Diff colitis.  Improved   Patient reports diarrhea the past week.  C diff PCR positive in the ED   8/5:  Patient reports diarrhea significantly improved and last episode was day prior.    No further diarrhea per patient   - Enteric precautions   - PO Vancomycin   - Continue to monitor stool output     Alcoholism/alcohol dependence  Alcohol intoxication  History of alcohol withdrawals  Patient had a recent hospitalization at [facility] from where he was discharged to TCU.  After he returned back home, he started drinking again.  He admits he drinks 1 pint of vodka daily.  He stated that he has a .  His ethanol level is 0.23.    - Continue multivitamins, folic acid and thiamine  - [facility] protocol in place  - Psychiatry consulted   - Patient is supposedly already committed and social work is following to help with disposition     Acute on chronic anemia   8/5:  Hemoglobin from yesterday evening down to 6.9. Given chronic liver disease and concern for GI bleed in setting of cirrhosis. Vitamin K given over night and transfused 1 unit of PRBCs   - Continue to monitor.  Hemoglobin back up to 8.6   - Conditional order to transfuse for hgb <7   - Octreotide drip  - IV Protonix BID   - GI following now and appreciate their recommendations.  No plans for endoscopy at this time      Atypical chest pain. Resolved   Patient continues to complain of chest pain since arrival to the ED.  Troponins negative.  EKG without evidence of acute ischemia  - Continue to monitor      Generalized weakness  Failure to thrive  This is likely multifactorial from his chronic alcohol use, alcoholic liver cirrhosis, poor oral intake, and recent diarrhea.    - PT & /care coordinator consulted    Hypoglycemia    Blood sugars were in the 40s upon arrival in the ER.  This is likely due to poor oral  intake.  He was given a couple of doses of D50 with improvement of his blood sugars.    - Hypoglycemia protocol in place  - Monitor for now      Tobacco use disorder  - Nicotine patch had been ordered    Recent Enterococcus bacteremia  He had been treated with ampicillin IV for 2 weeks as per ID recommendations.  He denies any fevers or leukocytosis at this time.    H/o BPH  - PTA Flomax and Proscar    History of COPD  This does not seem to be an acute issue at this time.    - PTA inhalers     History of depression/anxiety  - PTA medications resumed     Obstructive sleep apnea   Noncompliant with CPAP.    Thrombocytopenia, chronic, related to his chronic liver disease  There is no evidence of active bleeding.  Platelet count on admission was 148.    - We will monitor it periodically           Diet: Regular Diet Adult  Snacks/Supplements Adult: Ensure Enlive; Between Meals    DVT Prophylaxis: Pneumatic Compression Devices  Leger Catheter: Not present  Central Lines: None  Code Status: No CPR- Do NOT Intubate      Disposition Plan   Expected discharge: TBD.  Needs to figure out disposition planning based of continued ETOH use while committed.  Patient is close to being medically stable for discharge     The patient's care was discussed with the Bedside Nurse, Care Coordinator/ and Patient.    Time Spent on this Encounter   Over 35 minutes was spent examining and discussing the patient with greater than 50% time spent in counseling and/or coordination of care.     Godfrey Vasques DO  Hospitalist Service  [facility]  Securely message with the Vocera Web Console (learn more here)  Text page via [employer] Paging/Directory      Clinically Significant Risk Factors Present on Admission           # Severe Malnutrition, POA: based on Weight loss;Reduced intake;Subcutaneous fat loss;Muscle loss;Fluid retention (08/05/21 1000)      ______________________________________________________________________    Interval History   Patient seen and examined.  No acute events over night.  No fevers or chills.  Abdominal pain is improved.  No further diarrhea per patient.  No nausea or vomiting.  Tolerating diet.    Data reviewed today: I reviewed all medications, new labs and imaging results over the last 24 hours. I personally reviewed no images or EKG's today.    Physical Exam   Vital Signs: Temp: 98.8  F (37.1  C) Temp src: Oral BP: 138/83 Pulse: 68   Resp: 13 SpO2: 98 % O2 Device: None (Room air)    Weight: 158 lbs 3.2 oz  General Appearance: Resting comfortably.  NAd   Respiratory: Clear to auscultation.  No respiratory distress  Cardiovascular: RRR.  No obvious murmurs  GI: Soft.  Moderately distended  Skin: No obvious rashes or cyanosis.  Bruising noted to the extremities   Other: No edema.  No calf tenderness     Data   Recent Labs   Lab 08/06/21  1157 08/05/21  0532 08/05/21  0314 08/05/21  0008 08/04/21  2215 08/04/21  1635 08/04/21  1427 08/04/21  1153 08/04/21  0929 08/04/21  0929 08/03/21 2017   WBC 2.8* 2.9*  --  2.8*  --   --   --   --   --  4.5 5.5   HGB 8.6* 8.3* 8.5* 7.7*  --    < >  --   --    < > 7.5* 8.5*   MCV 95 94  --  96  --   --   --   --   --  96 98   PLT 78* 85*  --  82*  --   --   --   --   --  109* 148*   INR 1.39*  --   --   --   --   --   --   --   --   --  1.36*   * 133  --   --   --   --   --   --   --  133 139   POTASSIUM 4.5 4.4  --   --   --   --   --   --   --  4.0 4.0   CHLORIDE 107 107  --   --   --   --   --   --   --  109 111*   CO2 20 22  --   --   --   --   --   --   --  18* 14*   BUN 39* 40*  --   --   --   --   --   --   --  41* 43*   CR 3.13* 2.57*  --   --   --   --   --   --   --  2.27* 2.18*   ANIONGAP 5 4  --   --   --   --   --   --   --  6 14   KEV 8.2* 8.4*  --   --   --   --   --   --   --  8.4* 8.3*   * 114*  --   --  99  --   --   --   --  127* 52*   ALBUMIN  --   --   --   --    --   --   --  2.8*  --   --  2.9*   PROTTOTAL  --   --   --   --   --   --   --   --   --   --  7.0   BILITOTAL  --   --   --   --   --   --   --   --   --   --  1.0   ALKPHOS  --   --   --   --   --   --   --   --   --   --  176*   ALT  --   --   --   --   --   --   --   --   --   --  17   AST  --   --   --   --   --   --   --   --   --   --  43   LIPASE  --  228  --   --   --   --   --   --   --   --  604*   TROPONIN  --   --   --   --   --   --  <0.015  --   --   --  0.016    < > = values in this interval not displayed.     No results found for this or any previous visit (from the past 24 hour(s)).

## 2021-08-06 NOTE — CONSULTS
"[facility] Initial Psychiatric Consult     Reason for consultation: alcohol     Requesting Clinician: Dr. Vasques           History of Present Illness   Mr. 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 Psychiatry consulted due to resumption of alcohol use.     He reports he came in with weakness, lack of appetite, and resumption of alcohol use. 1 pint of liquor. Started drinking two days after getting out of treatment in mid July. Reports he is on a commitment and has a  through [facility] ([facility] Patient reports he is depressed, moderate. Denies hopelessness other than about his his alcohol use or suicidal thoughts. Sleeping poorly. Feels tired. Has slept well and been very tired here. Does not feel like he is going through withdrawal. Eating well again. He reports that his balance has been off.  Fell at home a few times. Blood sugar recalled as in the 40s. His balance was better before resuming his excessive alcohol use. He reports suffering from anxiety, noting it effects him sleep. Reports that he was anxious in  Treatment. Back was hurting. \"They had lousy meds.\" Denies any history of bipolar disorder or psychosis symptoms. Denies OCD, eating disorder, learning disorder or ADHD, or PTSD or trauma/abuse. Reports he gradually stops his medications when he drinks.     Reports stressors only his drinking and his family does not talk to him \"because of " "drinking.\"     Past Psychiatric History  Dr. Rand  Not currently in outpatient program   Recently finished treatment in mid July,   Prescribed mirtazapine, Trintellix, Seroquel  Though he stops medications when he drinks  No prior admissions  Over ten prior treatments  Prescribed antabuse. No recalled trials of naltrexone.     PAST MEDICAL HISTORY:     1.  End-stage liver cirrhosis.  2.  Recurrent ascites requiring multiple paracentesis in the past.  Last paracentesis was on 07/26/2021, when 8.7 liters of fluid was removed.  Prior to that, he had a paracentesis on 07/2015 with a 5.2 liters fluid removed 07/07/2021 with 3 liters of fluid removed.  3.  Hepatorenal syndrome.  4.  Chronic kidney disease stage IV, baseline creatinine between 2-2.2.  5.  Alcoholism.  6.  History of alcohol withdrawals.  7.  Depression/anxiety.  8.  Hypertension.  9.  Tobacco use disorder.  10.  COPD.  11.  Obstructive sleep apnea, noncompliant with CPAP.  12.  Thrombocytopenia.  13.  Recent bacteremia due to Enterococcus.  He had been on 2 weeks of IV ampicillin.     PAST SURGICAL HISTORY:   Back, hernia  Social History  Lives in [place].  once, divorce , and 2 children. 2 grandchildren. 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 Financially is doing ok. Reports children\"gave up on me.\" He has a son with special needs.   Review of systems:   10 point Review of Systems completed by Dr Eller, and is negative other than noted in the HPI.  BP/P/temp, weight reviewed. Acities, pain levels have been ok.      Medications:       disulfiram  250 mg Oral Daily     finasteride  5 mg Oral Daily     folic acid  1 mg Oral Daily     gabapentin  200 mg Oral TID     lidocaine (buffered or not buffered)  10 mL Intradermal Once     midodrine  10 mg Oral TID w/meals     mirtazapine  30 mg Oral At Bedtime     multivitamin w/minerals  1 tablet Oral Daily     nicotine  1 patch Transdermal Daily     nicotine   Transdermal Q8H     [Held " by provider] pantoprazole  40 mg Oral BID     pantoprazole (PROTONIX) IV  40 mg Intravenous BID     QUEtiapine  200 mg Oral At Bedtime     sodium chloride (PF)  3 mL Intracatheter Q8H     sodium chloride (PF)  3 mL Intracatheter Q8H     tamsulosin  0.8 mg Oral Daily     thiamine  100 mg Oral Daily     traZODone  100 mg Oral At Bedtime     vancomycin  125 mg Oral 4x Daily     vortioxetine  10 mg Oral BID     sodium chloride 0.9%, acetaminophen **OR** acetaminophen, albuterol, glucose **OR** dextrose **OR** glucagon, flumazenil, fluticasone-vilanterol, hydrOXYzine, lidocaine 4%, lidocaine (buffered or not buffered), melatonin, naloxone **OR** naloxone **OR** naloxone **OR** naloxone, ondansetron **OR** ondansetron, oxyCODONE, QUEtiapine, sodium chloride (PF), sodium chloride (PF)      Mental Status Examination:     Appearance:  awake, alert seated upright. Disheveled, ascities noticeable  Eye Contact:  good  Speech:  slowed, monotone  Use of Language:Appropriate  Psychomotor Behavior:  no evidence of tardive dyskinesia, dystonia, or tics  Mood:  anxious and depressed  Affect:  mood congruent  Thought Process:  logical, linear and goal oriented no loose associations  Thought Content:  no evidence of suicidal ideation or homicidal ideation, no auditory hallucinations present and no visual hallucinations present  Oriented to:  time, person, and place  Attention Span and Concentration:  intact  Recent and Remote Memory:  intact  Fund of Knowledge: delayed  Muscle Strength and Tone: normal  Coordination, Station and Gait: weak, feels off balance, not evaluated objectively  Insight:  good aware that he has serious problem  Judgment:  intact seems accepting of help        Labs/vitals:   No results found for this or any previous visit (from the past 24 hour(s)).  B/P: 110/67, T: 98.8, P: 62, R: 16    Impression:   Mr. 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 Psychiatry consulted due to resumption of alcohol use.     Risk level: risk of intentional harm to self or others assessed to be low. No thoughts of harming self or others, no history of violent behavior towards self or othersw. No agitation, no psychosis, in structured setting.       DIagnoses:   1. Alcohol use disorder, severe  2. Major Depressive disorder, recurrent, severe    Plan:   1. Social work consult. Please contact his [facility] . He believes his commitment is still active. They will have to help with discharge planning  2. Continue outpatient medications: Mirtazapine, Trinellix, Seroquel, Trazodone already restarted and he is not showing evidence of objective polypharmacy. He is feeling better here in the hosptial with eating, sleeping better, energy improving.  3. Likely will require some physical rehab, and then transition to treatment options recommended per his commitment   4. Patient denies need for inpatient psychiatry care for his depression/anxiety. He will need to follow up with outpatient psychiatrist Dr. Rand and follow commitment recommendations. No change to medications today as he is still setting in after recent relapse and getting back on his prior regimen    5. Monitor prolonged QTc, keep mag and potassium above 2 and 4. Likely has come down now with medical stabilization but recheck as needed. We reviewed risks of arrythmias with his psychiatric medicaitons including quetiapine. IF Qtc above 500 still would consider  lowering Quetiapine to 100 mg HS and 25 mg TID PRN.    Attestation:  Patient has been seen and evaluated by me,  Michael Eller MD

## 2021-08-06 NOTE — PLAN OF CARE
Pt A x O x 4. Received oxycodone x1 for abd pain. Oxygen saturation >90% on RA. SR on tele. Ambulates 1-2A w/ staff. Continuous Octreotide gtt. Atarax given x1. Received PO abx. No stools overnight. CIWA 0,0,0. Pt educated on poc, cares, medications and safety. Will continue to monitor.

## 2021-08-06 NOTE — PROGRESS NOTES
[facility]  Gastroenterology Progress Note     Jim Richard MRN# [id number]   YOB: 1955 Age: 66 year old          Assessment and Plan:   Jim Richard is a pleasant 66 year old male with alcoholic liver cirrhosis complicated with esophageal varices, abdominal ascites, hepatorenal syndrome, pancytopenia and c/o abdominal pain. GI consulted on 6/7/21.     Active Problems:  Alcoholic liver cirrhosis  Alcholic intoxication  Elevated Creatinine- hepatorenal syndrome  Abdominal pain  Abdominal ascites- concern for SBP  Hypoalbuminemia  Pancytopenia  Patient has suffered from chronic alcoholism and developed significant complications with decompensated liver cirrhosis. He has required paracentesis every 2 weeks. Has had elevating creatinine- currently 2.57. He has type 2 hepatorenal syndrome. Has pancytopenia due to bone marrow suppression.  Albumin 2.8.Tbili 1.0,   Platelets 85 Hemoglobin 6.9->7.7->8.5->8.3  MELD score 16  Last paracentesis done 8/3 and removed 6.7 L of fluid     -- Hemoglobin stable no plans for endoscopic procedure at this time  -- Continue midodrine and pantoprazole  -- Consider TIPS procedure in near future  -- Monitor CBC and BMP ( ordered with INR- still need to be drawn)  -- Regular diet     C Difficile diarrhea  Continue vancomycin 125 mg QID  This is source of many of his symptoms                Interval History:   no new complaints, doing well, denies chest pain, denies shortness of breath, alert, oriented to person, place and time, has had a bowel movement in the last 24 hours and doing well; no cp, sob, n/v/d, or abd pain.              Review of Systems:   C: NEGATIVE for fever, chills, change in weight  E/M: NEGATIVE for ear, mouth and throat problems  R: NEGATIVE for significant cough or SOB  CV: NEGATIVE for chest pain, palpitations or peripheral edema             Medications:   I have reviewed this patient's current medications    disulfiram   250 mg Oral Daily     finasteride  5 mg Oral Daily     folic acid  1 mg Oral Daily     gabapentin  200 mg Oral TID     lidocaine (buffered or not buffered)  10 mL Intradermal Once     midodrine  10 mg Oral TID w/meals     mirtazapine  30 mg Oral At Bedtime     multivitamin w/minerals  1 tablet Oral Daily     nicotine  1 patch Transdermal Daily     nicotine   Transdermal Q8H     [Held by provider] pantoprazole  40 mg Oral BID     pantoprazole (PROTONIX) IV  40 mg Intravenous BID     QUEtiapine  200 mg Oral At Bedtime     sodium chloride (PF)  3 mL Intracatheter Q8H     sodium chloride (PF)  3 mL Intracatheter Q8H     tamsulosin  0.8 mg Oral Daily     thiamine  100 mg Oral Daily     traZODone  100 mg Oral At Bedtime     vancomycin  125 mg Oral 4x Daily     vortioxetine  10 mg Oral BID                  Physical Exam:   Vitals were reviewed  Vital Signs with Ranges  Temp:  [98.5  F (36.9  C)-98.8  F (37.1  C)] 98.8  F (37.1  C)  Pulse:  [62-93] 71  Resp:  [7-39] 10  BP: ()/(51-88) 138/83  SpO2:  [82 %-99 %] 98 %  I/O last 3 completed shifts:  In: 1000 [P.O.:1000]  Out: 625 [Urine:625]  Constitutional: healthy, alert and no distress   Cardiovascular: negative, PMI normal. No lifts, heaves, or thrills. RRR. No murmurs, clicks gallops or rub  Respiratory: negative, Percussion normal. Good diaphragmatic excursion. Lungs clear  Abdomen: Abdomen soft, mildly tender. BS normal. No masses, organomegaly           Data:   I reviewed the patient's new clinical lab test results.   Recent Labs   Lab Test 08/05/21  0532 08/05/21  0314 08/05/21  0008 08/04/21  0929 08/03/21  2017 06/27/21  0803 06/25/21  1931 06/06/21  0600   WBC 2.9*  --  2.8* 4.5 5.5   < > 7.6 3.6*   HGB 8.3* 8.5* 7.7* 7.5* 8.5*   < > 8.4* 7.4*   MCV 94  --  96 96 98   < > 96 100   PLT 85*  --  82* 109* 148*   < > 231 111*   INR  --   --   --   --  1.36*  --  1.24* 1.31*    < > = values in this interval not displayed.     Recent Labs   Lab Test 08/05/21  0553  08/04/21 0929 08/03/21 2017   POTASSIUM 4.4 4.0 4.0   CHLORIDE 107 109 111*   CO2 22 18* 14*   BUN 40* 41* 43*   ANIONGAP 4 6 14     Recent Labs   Lab Test 08/05/21  0532 08/04/21  1153 08/03/21  2325 08/03/21  2017 07/10/21  0714 07/07/21  0420 07/06/21  0732 07/04/21  0809 06/16/21  1123 06/13/21  1150 04/04/21  1017 03/26/21  2131   ALBUMIN  --  2.8*  --  2.9* 2.8*  --  2.9* 2.9*  --   --    < > 2.7*   BILITOTAL  --   --   --  1.0  --   --  0.5 1.0   < >  --    < > 2.2*   ALT  --   --   --  17  --   --  20 22   < >  --    < > 26   AST  --   --   --  43  --   --  21 26   < >  --    < > 36   PROTEIN  --   --  100 *  --   --  Negative  --   --   --  Negative   < >  --    LIPASE 228  --   --  604*  --   --   --   --   --   --   --  444*    < > = values in this interval not displayed.       I reviewed the patient's new imaging results.    All laboratory data reviewed  All imaging studies reviewed by me.    Gloria De Leon PA-C,  8/6/2021  [facility]  Office : [phone]  Cell: [phone] (Dr. Shaw)  Cell: [phone] (Gloria De Leon PA-C)

## 2021-08-06 NOTE — CONSULTS
Care Management Initial Consult    General Information  Assessment completed with: Other (),  (Omayra Milan)  Type of CM/SW Visit: Initial Assessment    Primary Care Provider verified and updated as needed: Yes   Readmission within the last 30 days:        Reason for Consult: discharge planning, legal concerns, substance use concerns  Advance Care Planning: Advance Care Planning Reviewed: present on chart          Communication Assessment  Patient's communication style: spoken language (English or Bilingual)    Hearing Difficulty or Deaf: no   Wear Glasses or Blind: no    Cognitive  Cognitive/Neuro/Behavioral: WDL                      Living Environment:   People in home: alone     Current living Arrangements: house      Able to return to prior arrangements:         Family/Social Support:  Care provided by: self  Provides care for: no one  Marital Status: Single  Sibling(s)          Description of Support System: Supportive, Involved    Support Assessment: Adequate family and caregiver support, Adequate social supports    Current Resources:   Patient receiving home care services: No     Community Resources: [employer], [employer] Work[employer]  Equipment currently used at home: walker, rolling  Supplies currently used at home: None    Employment/Financial:  Employment Status: disabled        Financial Concerns: No concerns identified           Lifestyle & Psychosocial Needs:  Social Determinants of Health     Tobacco Use: High Risk     Smoking Tobacco Use: Current Every Day Smoker     Smokeless Tobacco Use: Never Used   Alcohol Use:      Frequency of Alcohol Consumption:      Average Number of Drinks:      Frequency of Binge Drinking:    Financial Resource Strain:      Difficulty of Paying Living Expenses:    Food Insecurity:      Worried About Running Out of Food in the Last Year:      Ran Out of Food in the Last Year:    Transportation Needs:      Lack of Transportation (Medical):      Lack of Transportation  (Non-Medical):    Physical Activity:      Days of Exercise per Week:      Minutes of Exercise per Session:    Stress:      Feeling of Stress :    Social Connections:      Frequency of Communication with Friends and Family:      Frequency of Social Gatherings with Friends and Family:      Attends [employer] Services:      Active Member of Clubs or Organizations:      Attends Club or Organization Meetings:      Marital Status:    Intimate Partner Violence:      Fear of Current or Ex-Partner:      Emotionally Abused:      Physically Abused:      Sexually Abused:    Depression:      PHQ-2 Score:    Housing Stability:      Unable to Pay for Housing in the Last Year:      Number of Places Lived in the Last Year:      Unstable Housing in the Last Year:        Functional Status:  Prior to admission patient needed assistance:              Mental Health Status:          Chemical Dependency Status:                Values/Beliefs:  Spiritual, Cultural Beliefs, Amish Practices, Values that affect care: no               Additional Information:  Per care management/social work consult for discharge planning.  Patient was admitted on 8-3-21 with alcohol intoxication.  The tentative date of discharge is yet to be determined.  Reviewed chart.  Patient has an active CD commitment to [facility] until May 10, 2022.  Patient hhas a  Omayra Milan, [phone].  Call placed to Omayra to update her that patient is currently in the hospital.  Omayra states that patient leaves every treatment facility/TCU that he goes to.  Omayra states she does not know what to do with patient any more.  Her last thought is a CD treatment program called [facility].   Patient has to be fairly independent, he can use a walker, but he has to be able to ambulate between buildings as [place] [employer] [place].  [facility] will have openings next week.  Updated Omayra as to patient's mobility level.  Explained that he is not medically  stable to discharge.  Explained that we will follow up on Monday.    Will continue to follow.      JULIA Delgado, [facility]    [phone]  [facility]

## 2021-08-07 LAB
ANION GAP SERPL CALCULATED.3IONS-SCNC: 7 MMOL/L (ref 3–14)
BUN SERPL-MCNC: 38 MG/DL (ref 7–30)
CALCIUM SERPL-MCNC: 8.2 MG/DL (ref 8.5–10.1)
CHLORIDE BLD-SCNC: 108 MMOL/L (ref 94–109)
CO2 SERPL-SCNC: 18 MMOL/L (ref 20–32)
CREAT SERPL-MCNC: 3.19 MG/DL (ref 0.66–1.25)
GFR SERPL CREATININE-BSD FRML MDRD: 19 ML/MIN/1.73M2
GLUCOSE BLD-MCNC: 122 MG/DL (ref 70–99)
GLUCOSE BLDC GLUCOMTR-MCNC: 101 MG/DL (ref 70–99)
GLUCOSE BLDC GLUCOMTR-MCNC: 139 MG/DL (ref 70–99)
HGB BLD-MCNC: 9.2 G/DL (ref 13.3–17.7)
POTASSIUM BLD-SCNC: 4.4 MMOL/L (ref 3.4–5.3)
SODIUM SERPL-SCNC: 133 MMOL/L (ref 133–144)

## 2021-08-07 PROCEDURE — 250N000011 HC RX IP 250 OP 636: Performed by: INTERNAL MEDICINE

## 2021-08-07 PROCEDURE — 250N000013 HC RX MED GY IP 250 OP 250 PS 637: Performed by: INTERNAL MEDICINE

## 2021-08-07 PROCEDURE — C9113 INJ PANTOPRAZOLE SODIUM, VIA: HCPCS | Performed by: INTERNAL MEDICINE

## 2021-08-07 PROCEDURE — 36415 COLL VENOUS BLD VENIPUNCTURE: CPT | Performed by: INTERNAL MEDICINE

## 2021-08-07 PROCEDURE — 80048 BASIC METABOLIC PNL TOTAL CA: CPT | Performed by: INTERNAL MEDICINE

## 2021-08-07 PROCEDURE — 120N000001 HC R&B MED SURG/OB

## 2021-08-07 PROCEDURE — 258N000003 HC RX IP 258 OP 636: Performed by: INTERNAL MEDICINE

## 2021-08-07 PROCEDURE — 99207 PR MOONLIGHTING INDICATOR: CPT | Performed by: INTERNAL MEDICINE

## 2021-08-07 PROCEDURE — 99233 SBSQ HOSP IP/OBS HIGH 50: CPT | Performed by: INTERNAL MEDICINE

## 2021-08-07 PROCEDURE — 85018 HEMOGLOBIN: CPT | Performed by: INTERNAL MEDICINE

## 2021-08-07 RX ADMIN — LACTULOSE 10 G: 20 SOLUTION ORAL at 08:51

## 2021-08-07 RX ADMIN — MIDODRINE HYDROCHLORIDE 10 MG: 5 TABLET ORAL at 12:03

## 2021-08-07 RX ADMIN — OXYCODONE HYDROCHLORIDE 5 MG: 5 TABLET ORAL at 16:17

## 2021-08-07 RX ADMIN — VANCOMYCIN HYDROCHLORIDE 125 MG: 125 CAPSULE ORAL at 08:51

## 2021-08-07 RX ADMIN — MULTIPLE VITAMINS W/ MINERALS TAB 1 TABLET: TAB at 08:51

## 2021-08-07 RX ADMIN — GABAPENTIN 200 MG: 100 CAPSULE ORAL at 14:00

## 2021-08-07 RX ADMIN — OXYCODONE HYDROCHLORIDE 5 MG: 5 TABLET ORAL at 22:00

## 2021-08-07 RX ADMIN — LACTULOSE 10 G: 20 SOLUTION ORAL at 21:22

## 2021-08-07 RX ADMIN — ONDANSETRON 4 MG: 2 INJECTION INTRAMUSCULAR; INTRAVENOUS at 23:06

## 2021-08-07 RX ADMIN — QUETIAPINE FUMARATE 200 MG: 200 TABLET ORAL at 21:22

## 2021-08-07 RX ADMIN — VANCOMYCIN HYDROCHLORIDE 125 MG: 125 CAPSULE ORAL at 12:03

## 2021-08-07 RX ADMIN — ONDANSETRON 4 MG: 2 INJECTION INTRAMUSCULAR; INTRAVENOUS at 10:14

## 2021-08-07 RX ADMIN — MIRTAZAPINE 30 MG: 15 TABLET, FILM COATED ORAL at 21:21

## 2021-08-07 RX ADMIN — PANTOPRAZOLE SODIUM 40 MG: 40 INJECTION, POWDER, FOR SOLUTION INTRAVENOUS at 21:17

## 2021-08-07 RX ADMIN — MIDODRINE HYDROCHLORIDE 10 MG: 5 TABLET ORAL at 08:51

## 2021-08-07 RX ADMIN — VANCOMYCIN HYDROCHLORIDE 125 MG: 125 CAPSULE ORAL at 16:17

## 2021-08-07 RX ADMIN — GABAPENTIN 200 MG: 100 CAPSULE ORAL at 21:22

## 2021-08-07 RX ADMIN — TRAZODONE HYDROCHLORIDE 100 MG: 100 TABLET ORAL at 21:22

## 2021-08-07 RX ADMIN — OXYCODONE HYDROCHLORIDE 5 MG: 5 TABLET ORAL at 08:59

## 2021-08-07 RX ADMIN — MIDODRINE HYDROCHLORIDE 10 MG: 5 TABLET ORAL at 18:08

## 2021-08-07 RX ADMIN — VORTIOXETINE 10 MG: 10 TABLET, FILM COATED ORAL at 08:51

## 2021-08-07 RX ADMIN — VANCOMYCIN HYDROCHLORIDE 125 MG: 125 CAPSULE ORAL at 21:21

## 2021-08-07 RX ADMIN — VORTIOXETINE 10 MG: 10 TABLET, FILM COATED ORAL at 21:21

## 2021-08-07 RX ADMIN — DISULFIRAM 250 MG: 250 TABLET ORAL at 08:52

## 2021-08-07 RX ADMIN — THIAMINE HCL TAB 100 MG 100 MG: 100 TAB at 08:51

## 2021-08-07 RX ADMIN — TAMSULOSIN HYDROCHLORIDE 0.8 MG: 0.4 CAPSULE ORAL at 08:51

## 2021-08-07 RX ADMIN — FINASTERIDE 5 MG: 5 TABLET, FILM COATED ORAL at 08:52

## 2021-08-07 RX ADMIN — FOLIC ACID 1 MG: 1 TABLET ORAL at 08:52

## 2021-08-07 RX ADMIN — ONDANSETRON 4 MG: 2 INJECTION INTRAMUSCULAR; INTRAVENOUS at 17:42

## 2021-08-07 RX ADMIN — NICOTINE 1 PATCH: 14 PATCH, EXTENDED RELEASE TRANSDERMAL at 08:59

## 2021-08-07 RX ADMIN — OCTREOTIDE ACETATE 50 MCG/HR: 200 INJECTION, SOLUTION INTRAVENOUS; SUBCUTANEOUS at 23:05

## 2021-08-07 RX ADMIN — GABAPENTIN 200 MG: 100 CAPSULE ORAL at 08:52

## 2021-08-07 RX ADMIN — PANTOPRAZOLE SODIUM 40 MG: 40 INJECTION, POWDER, FOR SOLUTION INTRAVENOUS at 08:51

## 2021-08-07 RX ADMIN — QUETIAPINE FUMARATE 50 MG: 50 TABLET ORAL at 08:59

## 2021-08-07 ASSESSMENT — ACTIVITIES OF DAILY LIVING (ADL)
ADLS_ACUITY_SCORE: 15
ADLS_ACUITY_SCORE: 15

## 2021-08-07 ASSESSMENT — MIFFLIN-ST. JEOR: SCORE: 1498.39

## 2021-08-07 NOTE — PLAN OF CARE
Neuro: A/O x4  CV/Rhythm: SR   Resp/02: LS clear on RA  GI/Diet: passing gas, last BM 8-4 on Regular diet  : voids in urinal  Skin/Incisions/Sites: scattered bruises/scabs, excoriated groin blanchable buttocks  Pulses/CMS: weak pedal, +2 radial  Edema: na  Activity/Falls Risk: Up with assist of 1, gait belt walker  Lines/Drains/IVs: octreotide infusing 10ml/hr   Labs/BGM: Hgb 9.2, Cr 3.19  Test/Procedures:   VS/Pain: VSS pt c/o of abdominal pain give oxycodone x1, and Seroquel per pt request  DC Plan: SW following for placement, TCU   Other:  Pt transferring to 65, report called to RN

## 2021-08-07 NOTE — PROGRESS NOTES
Pt A x O x 4. Received oxycodone x1 for abd pain. Oxygen saturation >90% on RA. SR on tele. Ambulates 2A w/ staff. Continuous Octreotide gtt. Received PO abx. Pt educated on poc, cares, medications and safety. Will continue to monitor.

## 2021-08-07 NOTE — PROGRESS NOTES
[facility]  Hospitalist Progress Note for 8/7/2021       Assessment and Plan:   Jim Richard is a 66-year-old gentleman with a complex past medical history of alcohol abuse/dependence complicated by alcoholic liver disease with ascites, status post multiple paracentesis in the past, hepatorenal syndrome, chronic kidney disease stage IV, history of recent bacteremia due to Enterococcus, hypertension, COPD, tobacco use disorder, depression/anxiety, thrombocytopenia and obstructive sleep apnea, who presented to ER for evaluation of generalized weakness, diarrhea and dry heaves.     Decompensated alcoholic liver cirrhosis with recurrent ascites  Patient has been having issues with recurrent ascites requiring multiple paracenteses.  It seems that last paracentesis was on 07/26/2021 when 8.7 liters of fluid was removed before that had paracentesis on 07/2015 with 5.2 liters fluid removed.  It looks like his prior ascites fluid analysis was suggestive of SBP.  Now, he has significant abdominal distention.  I think that some of his symptoms of dry heaves, without shortness of breath are related to his significant abdominal distention.   MELD-Na score: 22 at 8/5/2021  5:32 AM  MELD score: 19 at 8/5/2021  5:32 AM  Calculated from:  Serum Creatinine: 2.57 mg/dL at 8/5/2021  5:32 AM  Serum Sodium: 133 mmol/L at 8/5/2021  5:32 AM  Total Bilirubin: 1.0 mg/dL at 8/3/2021  8:17 PM  INR(ratio): 1.36 at 8/3/2021  8:17 PM  Age: 66 years  * Paracentesis (8/4):  Removed over 6 L and given Albumin   - Holding PTA diuretics given diarrhea, worsening creatinine & possible GI bleed   - Resumed PTA Lactulose on 8/6  - c/o diffuse abd pain 2/2 ascites + umbilical hernia- on Oxycodone +  plan for paracentesis early next wee- ? Monday  - discontinue IMC & transfer to medical floor today  - GI following and appreciate their recommendations.     Suspected hepato-renal syndrome with CKD stage IV   Cr function fluctuates in the  2s.  Was 2.18 on admission but gradually worsening.  Now up to 3.19.    - Continue to monitor renal function   - Holding PTA Spironolactone for now   - If renal function continues to worsen may need to consider nephrology consultation- in AM if creatinine worse     C Diff colitis.  Improved   Patient reports diarrhea the past week.  C diff PCR positive in the ED   8/5:  Patient reports diarrhea significantly improved and last episode was day prior.    No further diarrhea per patient x 2 days  - Enteric precautions   - PO Vancomycin   - Continue to monitor stool output      Alcoholism/alcohol dependence  Alcohol intoxication  History of alcohol withdrawals  Patient had a recent hospitalization at [facility] from where he was discharged to TCU.  After he returned back home, he started drinking again.  He admits he drinks 1 pint of vodka daily.  He stated that he has a .  His ethanol level is 0.23.    - Continue multivitamins, folic acid and thiamine  - CIWA protocol in place  - Psychiatry consulted   - Patient is supposedly already committed and social work is following to help with disposition      Acute on chronic anemia   8/5:  Hemoglobin from yesterday evening down to 6.9. Given chronic liver disease and concern for GI bleed in setting of cirrhosis. Vitamin K given over night and transfused 1 unit of PRBCs   - Continue to monitor.  Hemoglobin back up to 8.6   - Conditional order to transfuse for hgb <7   - on Octreotide drip x /8/4  - IV Protonix BID   - GI following now and appreciate their recommendations.  No plans for endoscopy at this time       Atypical chest pain. Resolved   Patient continues to complain of chest pain since arrival to the ED.  Troponins negative.  EKG without evidence of acute ischemia  - Continue to monitor      Generalized weakness  Failure to thrive  This is likely multifactorial from his chronic alcohol use, alcoholic liver cirrhosis, poor oral intake, and  recent diarrhea.    - PT & /care coordinator consulted     Hypoglycemia    Blood sugars were in the 40s upon arrival in the ER.  This is likely due to poor oral intake.  He was given a couple of doses of D50 with improvement of his blood sugars.    - Hypoglycemia protocol in place  - BS > 100.Monitor for now      Tobacco use disorder  - Nicotine patch had been ordered     Recent Enterococcus bacteremia  He had been treated with ampicillin IV for 2 weeks as per ID recommendations.  He denies any fevers or leukocytosis at this time.     H/o BPH  - PTA Flomax and Proscar     History of COPD  This does not seem to be an acute issue at this time.    - PTA inhalers      History of depression/anxiety  - PTA medications resumed      Obstructive sleep apnea   Noncompliant with CPAP.     Thrombocytopenia, chronic, related to his chronic liver disease  There is no evidence of active bleeding.  Platelet count on admission was 148.    - We will monitor it periodically     Diet: Regular Diet Adult  Snacks/Supplements Adult: Ensure Enlive; Between Meals    DVT Prophylaxis: Pneumatic Compression Devices  Leger Catheter: Not present  Central Lines: None  Code Status: No CPR- Do NOT Intubate       Disposition Plan   Expected discharge: TBD.  Needs to figure out disposition planning based of continued ETOH use while committed.  Patient is close to being medically stable for discharge  Transfer to medical floor as he does not need IMC.     Caryl Draper MD.  Hospitalist [phone] (7am -6 pm)                 Interval History:   C/o continued  abdominal pain/distension. No diarrhea/nauseaa/vomitting              Medications:       disulfiram  250 mg Oral Daily     finasteride  5 mg Oral Daily     folic acid  1 mg Oral Daily     gabapentin  200 mg Oral TID     lactulose  10 g Oral BID     lidocaine (buffered or not buffered)  10 mL Intradermal Once     midodrine  10 mg Oral TID w/meals     mirtazapine  30 mg Oral At  "Bedtime     multivitamin w/minerals  1 tablet Oral Daily     nicotine  1 patch Transdermal Daily     nicotine   Transdermal Q8H     [Held by provider] pantoprazole  40 mg Oral BID     pantoprazole (PROTONIX) IV  40 mg Intravenous BID     QUEtiapine  200 mg Oral At Bedtime     sodium chloride (PF)  3 mL Intracatheter Q8H     sodium chloride (PF)  3 mL Intracatheter Q8H     tamsulosin  0.8 mg Oral Daily     thiamine  100 mg Oral Daily     traZODone  100 mg Oral At Bedtime     vancomycin  125 mg Oral 4x Daily     vortioxetine  10 mg Oral BID     sodium chloride 0.9%, acetaminophen **OR** acetaminophen, albuterol, glucose **OR** dextrose **OR** glucagon, flumazenil, fluticasone-vilanterol, hydrOXYzine, lidocaine 4%, lidocaine (buffered or not buffered), melatonin, naloxone **OR** naloxone **OR** naloxone **OR** naloxone, ondansetron **OR** ondansetron, oxyCODONE, QUEtiapine, sodium chloride (PF), sodium chloride (PF)               Physical Exam:   Blood pressure 130/81, pulse 80, temperature 98.6  F (37  C), temperature source Oral, resp. rate 15, height 1.702 m (5' 7\"), weight 76.5 kg (168 lb 9.6 oz), SpO2 98 %.  Wt Readings from Last 4 Encounters:   21 76.5 kg (168 lb 9.6 oz)   21 83.9 kg (185 lb)   21 83.9 kg (185 lb)   21 84.5 kg (186 lb 4.8 oz)         Vital Sign Ranges  Temperature Temp  Av.2  F (36.8  C)  Min: 97.4  F (36.3  C)  Max: 98.7  F (37.1  C)   Blood pressure Systolic (24hrs), Av , Min:117 , Max:151        Diastolic (24hrs), Av, Min:75, Max:84      Pulse Pulse  Av.8  Min: 62  Max: 90   Respirations Resp  Av.7  Min: 7  Max: 23   Pulse oximetry SpO2  Av.5 %  Min: 94 %  Max: 99 %         Intake/Output Summary (Last 24 hours) at 2021 0800  Last data filed at 2021 0300  Gross per 24 hour   Intake 300 ml   Output 1250 ml   Net -950 ml       Constitutional: Awake, alert, cooperative, no apparent distress   Lungs: Clear to auscultation bilaterally, no " crackles or wheezing   Cardiovascular: Regular rate and rhythm, normal S1 and S2, and no murmur noted   Abdomen: distended, bulging umbilical hernia,soft, diffuse tenderness. +ve bowel sounds   Skin: No rashes, no cyanosis, no edema               Data:   All laboratory data reviewed

## 2021-08-07 NOTE — PLAN OF CARE
Cognitive Concerns/ Orientation : A&Ox4  BEHAVIOR & AGGRESSION TOOL COLOR: green  CIWA SCORE: 0  ABNL VS/O2: VSS, on room air  MOBILITY: assist of 1 and walker  PAIN MANAGMENT: oxycodone given for abd pain  DIET: regular  BOWEL/BLADDER: uses the urinal, no BM  ABNL LAB/BG: hgb 9.2, Cr 3.1  DRAIN/DEVICES: right PIV  TELEMETRY RHYTHM: SR  SKIN: bruised, scabs, lemuel  TESTS/PROCEDURES: none  D/C DAY/GOALS/PLACE: TBD.  Needs to figure out disposition planning based of continued ETOH use while committed.  OTHER IMPORTANT INFO: Abd is distended, nauseated and vomited once-zofran given. Up in the chair for dinner.

## 2021-08-08 LAB
ANION GAP SERPL CALCULATED.3IONS-SCNC: 8 MMOL/L (ref 3–14)
BUN SERPL-MCNC: 44 MG/DL (ref 7–30)
CALCIUM SERPL-MCNC: 7.8 MG/DL (ref 8.5–10.1)
CHLORIDE BLD-SCNC: 106 MMOL/L (ref 94–109)
CO2 SERPL-SCNC: 17 MMOL/L (ref 20–32)
CREAT SERPL-MCNC: 3.33 MG/DL (ref 0.66–1.25)
ERYTHROCYTE [DISTWIDTH] IN BLOOD BY AUTOMATED COUNT: 17.1 % (ref 10–15)
GFR SERPL CREATININE-BSD FRML MDRD: 18 ML/MIN/1.73M2
GLUCOSE BLD-MCNC: 175 MG/DL (ref 70–99)
HCT VFR BLD AUTO: 26.3 % (ref 40–53)
HGB BLD-MCNC: 8.7 G/DL (ref 13.3–17.7)
INR PPP: 1.34 (ref 0.85–1.15)
MCH RBC QN AUTO: 32.2 PG (ref 26.5–33)
MCHC RBC AUTO-ENTMCNC: 33.1 G/DL (ref 31.5–36.5)
MCV RBC AUTO: 97 FL (ref 78–100)
PLATELET # BLD AUTO: 108 10E3/UL (ref 150–450)
POTASSIUM BLD-SCNC: 4.2 MMOL/L (ref 3.4–5.3)
RBC # BLD AUTO: 2.7 10E6/UL (ref 4.4–5.9)
SODIUM SERPL-SCNC: 131 MMOL/L (ref 133–144)
WBC # BLD AUTO: 5 10E3/UL (ref 4–11)

## 2021-08-08 PROCEDURE — C9113 INJ PANTOPRAZOLE SODIUM, VIA: HCPCS | Performed by: INTERNAL MEDICINE

## 2021-08-08 PROCEDURE — 250N000011 HC RX IP 250 OP 636: Performed by: INTERNAL MEDICINE

## 2021-08-08 PROCEDURE — 99233 SBSQ HOSP IP/OBS HIGH 50: CPT | Performed by: INTERNAL MEDICINE

## 2021-08-08 PROCEDURE — 250N000013 HC RX MED GY IP 250 OP 250 PS 637: Performed by: INTERNAL MEDICINE

## 2021-08-08 PROCEDURE — 85027 COMPLETE CBC AUTOMATED: CPT | Performed by: INTERNAL MEDICINE

## 2021-08-08 PROCEDURE — 99207 PR MOONLIGHTING INDICATOR: CPT | Performed by: INTERNAL MEDICINE

## 2021-08-08 PROCEDURE — P9047 ALBUMIN (HUMAN), 25%, 50ML: HCPCS | Performed by: INTERNAL MEDICINE

## 2021-08-08 PROCEDURE — 36415 COLL VENOUS BLD VENIPUNCTURE: CPT | Performed by: INTERNAL MEDICINE

## 2021-08-08 PROCEDURE — 85610 PROTHROMBIN TIME: CPT | Performed by: INTERNAL MEDICINE

## 2021-08-08 PROCEDURE — 80048 BASIC METABOLIC PNL TOTAL CA: CPT | Performed by: INTERNAL MEDICINE

## 2021-08-08 PROCEDURE — 120N000001 HC R&B MED SURG/OB

## 2021-08-08 RX ORDER — ALBUMIN (HUMAN) 12.5 G/50ML
100 SOLUTION INTRAVENOUS ONCE
Status: COMPLETED | OUTPATIENT
Start: 2021-08-08 | End: 2021-08-08

## 2021-08-08 RX ORDER — PHYTONADIONE 5 MG/1
5 TABLET ORAL ONCE
Status: DISCONTINUED | OUTPATIENT
Start: 2021-08-09 | End: 2021-08-08

## 2021-08-08 RX ORDER — OCTREOTIDE ACETATE 100 UG/ML
200 INJECTION, SOLUTION INTRAVENOUS; SUBCUTANEOUS 3 TIMES DAILY
Status: DISCONTINUED | OUTPATIENT
Start: 2021-08-08 | End: 2021-08-12

## 2021-08-08 RX ORDER — OCTREOTIDE ACETATE 200 UG/ML
200 INJECTION INTRAVENOUS 3 TIMES DAILY
Status: DISCONTINUED | OUTPATIENT
Start: 2021-08-08 | End: 2021-08-08 | Stop reason: CLARIF

## 2021-08-08 RX ORDER — ALBUMIN (HUMAN) 12.5 G/50ML
25 SOLUTION INTRAVENOUS DAILY
Status: DISCONTINUED | OUTPATIENT
Start: 2021-08-09 | End: 2021-08-11

## 2021-08-08 RX ORDER — PANTOPRAZOLE SODIUM 40 MG/1
40 TABLET, DELAYED RELEASE ORAL
Status: DISCONTINUED | OUTPATIENT
Start: 2021-08-08 | End: 2021-08-22 | Stop reason: HOSPADM

## 2021-08-08 RX ADMIN — GABAPENTIN 200 MG: 100 CAPSULE ORAL at 13:52

## 2021-08-08 RX ADMIN — PANTOPRAZOLE SODIUM 40 MG: 40 INJECTION, POWDER, FOR SOLUTION INTRAVENOUS at 08:53

## 2021-08-08 RX ADMIN — TRAZODONE HYDROCHLORIDE 100 MG: 100 TABLET ORAL at 22:04

## 2021-08-08 RX ADMIN — OXYCODONE HYDROCHLORIDE 5 MG: 5 TABLET ORAL at 16:38

## 2021-08-08 RX ADMIN — MIDODRINE HYDROCHLORIDE 10 MG: 5 TABLET ORAL at 11:52

## 2021-08-08 RX ADMIN — MIRTAZAPINE 30 MG: 15 TABLET, FILM COATED ORAL at 22:04

## 2021-08-08 RX ADMIN — NICOTINE 1 PATCH: 14 PATCH, EXTENDED RELEASE TRANSDERMAL at 08:55

## 2021-08-08 RX ADMIN — OXYCODONE HYDROCHLORIDE 5 MG: 5 TABLET ORAL at 04:18

## 2021-08-08 RX ADMIN — VANCOMYCIN HYDROCHLORIDE 125 MG: 125 CAPSULE ORAL at 20:32

## 2021-08-08 RX ADMIN — ONDANSETRON 4 MG: 2 INJECTION INTRAMUSCULAR; INTRAVENOUS at 22:15

## 2021-08-08 RX ADMIN — GABAPENTIN 200 MG: 100 CAPSULE ORAL at 08:53

## 2021-08-08 RX ADMIN — HYDROXYZINE HYDROCHLORIDE 50 MG: 25 TABLET ORAL at 20:50

## 2021-08-08 RX ADMIN — GABAPENTIN 200 MG: 100 CAPSULE ORAL at 20:31

## 2021-08-08 RX ADMIN — MIDODRINE HYDROCHLORIDE 10 MG: 5 TABLET ORAL at 17:38

## 2021-08-08 RX ADMIN — VORTIOXETINE 10 MG: 10 TABLET, FILM COATED ORAL at 20:31

## 2021-08-08 RX ADMIN — DISULFIRAM 250 MG: 250 TABLET ORAL at 08:53

## 2021-08-08 RX ADMIN — OCTREOTIDE ACETATE 200 MCG: 100 INJECTION, SOLUTION INTRAVENOUS; SUBCUTANEOUS at 22:04

## 2021-08-08 RX ADMIN — THIAMINE HCL TAB 100 MG 100 MG: 100 TAB at 08:53

## 2021-08-08 RX ADMIN — VANCOMYCIN HYDROCHLORIDE 125 MG: 125 CAPSULE ORAL at 11:52

## 2021-08-08 RX ADMIN — ALBUMIN HUMAN 100 G: 0.25 SOLUTION INTRAVENOUS at 16:32

## 2021-08-08 RX ADMIN — QUETIAPINE FUMARATE 200 MG: 200 TABLET ORAL at 22:04

## 2021-08-08 RX ADMIN — PANTOPRAZOLE SODIUM 40 MG: 40 TABLET, DELAYED RELEASE ORAL at 16:21

## 2021-08-08 RX ADMIN — LACTULOSE 10 G: 20 SOLUTION ORAL at 20:32

## 2021-08-08 RX ADMIN — OCTREOTIDE ACETATE 200 MCG: 100 INJECTION, SOLUTION INTRAVENOUS; SUBCUTANEOUS at 16:33

## 2021-08-08 RX ADMIN — MIDODRINE HYDROCHLORIDE 10 MG: 5 TABLET ORAL at 08:53

## 2021-08-08 RX ADMIN — OXYCODONE HYDROCHLORIDE 5 MG: 5 TABLET ORAL at 22:17

## 2021-08-08 RX ADMIN — ACETAMINOPHEN 650 MG: 325 TABLET, FILM COATED ORAL at 01:43

## 2021-08-08 RX ADMIN — ONDANSETRON 4 MG: 2 INJECTION INTRAMUSCULAR; INTRAVENOUS at 16:24

## 2021-08-08 RX ADMIN — LACTULOSE 10 G: 20 SOLUTION ORAL at 08:52

## 2021-08-08 RX ADMIN — FINASTERIDE 5 MG: 5 TABLET, FILM COATED ORAL at 08:54

## 2021-08-08 RX ADMIN — VANCOMYCIN HYDROCHLORIDE 125 MG: 125 CAPSULE ORAL at 08:53

## 2021-08-08 RX ADMIN — VANCOMYCIN HYDROCHLORIDE 125 MG: 125 CAPSULE ORAL at 16:21

## 2021-08-08 RX ADMIN — VORTIOXETINE 10 MG: 10 TABLET, FILM COATED ORAL at 08:53

## 2021-08-08 RX ADMIN — FOLIC ACID 1 MG: 1 TABLET ORAL at 08:54

## 2021-08-08 RX ADMIN — MULTIPLE VITAMINS W/ MINERALS TAB 1 TABLET: TAB at 08:54

## 2021-08-08 RX ADMIN — TAMSULOSIN HYDROCHLORIDE 0.8 MG: 0.4 CAPSULE ORAL at 08:53

## 2021-08-08 ASSESSMENT — ACTIVITIES OF DAILY LIVING (ADL)
ADLS_ACUITY_SCORE: 13
ADLS_ACUITY_SCORE: 14
ADLS_ACUITY_SCORE: 13

## 2021-08-08 ASSESSMENT — MIFFLIN-ST. JEOR: SCORE: 1544.63

## 2021-08-08 NOTE — PROGRESS NOTES
[facility]  Hospitalist Progress Note for 8/8/2021       Assessment and Plan:   Jim Richard is a 66-year-old gentleman with a complex past medical history of alcohol abuse/dependence complicated by alcoholic liver disease with ascites, status post multiple paracentesis in the past, hepatorenal syndrome, chronic kidney disease stage IV, history of recent bacteremia due to Enterococcus, hypertension, COPD, tobacco use disorder, depression/anxiety, thrombocytopenia and obstructive sleep apnea, who presented to ER for evaluation of generalized weakness, diarrhea and dry heaves.     Decompensated alcoholic liver cirrhosis with recurrent ascites  Portal Hypertension:  Patient has been having issues with recurrent ascites requiring multiple paracenteses.  It seems that last paracentesis was on 07/26/2021 when 8.7 liters of fluid was removed before that had paracentesis on 07/2015 with 5.2 liters fluid removed.  It looks like his prior ascites fluid analysis was suggestive of SBP.    Now, he has significant abdominal distention.  I think that some of his symptoms of dry heaves, without shortness of breath are related to his significant abdominal distention.   MELD-Na score: 22 at 8/5/2021  5:32 AM  MELD score: 19 at 8/5/2021  5:32 AM  Calculated from:  Serum Creatinine: 2.57 mg/dL at 8/5/2021  5:32 AM  Serum Sodium: 133 mmol/L at 8/5/2021  5:32 AM  Total Bilirubin: 1.0 mg/dL at 8/3/2021  8:17 PM  INR(ratio): 1.36 at 8/3/2021  8:17 PM  Age: 66 years  * Paracentesis (8/4):  Removed over 6 L and given Albumin (SAAG 1.9 consistent with portal Hypertension). Fluid wbc 201 not indicative of SBP  -  PTA diuretics held given diarrhea, worsening creatinine & possible GI bleed   - started on Octreotide drip on 8/4 for possible GI bleed continued till today   - Resumed PTA Lactulose on 8/6  - c/o diffuse abd pain + umbilical hernia ( ascitic fluid not indicative of SBP)- on Oxycodone  - ordered repeat IR  paracentesis with Albumin in AM. Give 1 dose of po Vit K early AM INR 1.3 today  - discontinued IMC & transfer to medical floor today  - GI following and appreciate their recommendations.  - diet changed to low salt. 2/2 elevated creatinine not on fluid restriction     Suspected hepato-renal syndrome with CKD stage IV   Cr function fluctuates in the 2s.  Was 2.18 on admission but gradually worsening.    - PTA Spironolactone has been on hold since admission  - creatinine continues to rise, 3.33 today.    Pt has been on Octreotide drip + Midodrin 10 mg TID + Trintellix 10 mg bid   - diarrhea improved   -  Cr continues to worsen- start IV Albumin, change Octreotide to Tid, continue Midodrine & Trintellix   - request nephrology consult in AM  - GI is following     C Diff colitis.  Improved   Patient reports diarrhea the past week.  C diff PCR positive in the ED   8/5:  Patient reports diarrhea significantly improved and last episode was day prior.    No further diarrhea per patient x 2 days  - continue PO Vancomycin / enteric precautions  - Continue to monitor stool output      Alcoholism/alcohol dependence  Alcohol intoxication  History of alcohol withdrawals  Patient had a recent hospitalization at [facility] from where he was discharged to TCU.  After he returned back home, he started drinking again.  He admits he drinks 1 pint of vodka daily.  He stated that he has a .  His ethanol level is 0.23.    - Continue multivitamins, folic acid and thiamine  - WA protocol in place  - Psychiatry consulted   - Patient is supposedly already committed and social work is following to help with disposition      Acute on chronic anemia   8/5:  Hemoglobin from yesterday evening down to 6.9. Given chronic liver disease and concern for GI bleed in setting of cirrhosis. Vitamin K given over night and transfused 1 unit of PRBCs   - no further GI bleed, Hemoglobin back up to 8.7   - changed IV to PO Protonix  BID   - Conditional order to transfuse for hgb <7   - on Octreotide drip x /8/4, changed to TID  - GI following now and appreciate their recommendations.  No plans for endoscopy at this time       Atypical chest pain. Resolved   Patient continues to complain of chest pain since arrival to the ED.  Troponins negative.  EKG without evidence of acute ischemia  - Continue to monitor      Generalized weakness  Failure to thrive  This is likely multifactorial from his chronic alcohol use, alcoholic liver cirrhosis, poor oral intake, and recent diarrhea.    - PT & /care coordinator consulted     Hypoglycemia  - resolved  Blood sugars were in the 40s upon arrival in the ER.  This is likely due to poor oral intake.  He was given a couple of doses of D50 with improvement of his blood sugars.    - Hypoglycemia protocol in place  - BS > 100.Monitor for now      Tobacco use disorder  - Nicotine patch had been ordered     Recent Enterococcus bacteremia  He had been treated with ampicillin IV for 2 weeks as per ID recommendations.  He denies any fevers or leukocytosis at this time.     H/o BPH  - PTA Flomax and Proscar     History of COPD  This does not seem to be an acute issue at this time.    - PTA inhalers      History of depression/anxiety  - PTA medications resumed      Obstructive sleep apnea   Noncompliant with CPAP.     Thrombocytopenia, chronic, related to his chronic liver disease  There is no evidence of active bleeding.  Platelet count on admission was 148.    - stable -108 today. Will monitor it periodically     Diet: Regular Diet Adult changed to low salt. 2/2 elevated creatinine not on fluid restriction  Snacks/Supplements Adult: Ensure Enlive; Between Meals    DVT Prophylaxis: Pneumatic Compression Devices  Leger Catheter: Not present  Central Lines: None  Code Status: No CPR- Do NOT Intubate       Disposition Plan   Expected discharge: TBD.  Needs to figure out disposition planning based of continued  "ETOH use while committed.      Caryl Draper MD.  Hospitalist [phone] (7am -6 pm)                 Interval History:   C/o continued  abdominal pain/distension. No diarrhea/nauseaa. Small emesis this AM(food he ate last night)              Medications:       disulfiram  250 mg Oral Daily     finasteride  5 mg Oral Daily     folic acid  1 mg Oral Daily     gabapentin  200 mg Oral TID     lactulose  10 g Oral BID     midodrine  10 mg Oral TID w/meals     mirtazapine  30 mg Oral At Bedtime     multivitamin w/minerals  1 tablet Oral Daily     nicotine  1 patch Transdermal Daily     nicotine   Transdermal Q8H     pantoprazole (PROTONIX) IV  40 mg Intravenous BID     QUEtiapine  200 mg Oral At Bedtime     sodium chloride (PF)  3 mL Intracatheter Q8H     tamsulosin  0.8 mg Oral Daily     traZODone  100 mg Oral At Bedtime     vancomycin  125 mg Oral 4x Daily     vortioxetine  10 mg Oral BID     sodium chloride 0.9%, acetaminophen **OR** acetaminophen, albuterol, glucose **OR** dextrose **OR** glucagon, flumazenil, fluticasone-vilanterol, hydrOXYzine, lidocaine 4%, lidocaine (buffered or not buffered), melatonin, naloxone **OR** naloxone **OR** naloxone **OR** naloxone, ondansetron **OR** ondansetron, oxyCODONE, QUEtiapine, sodium chloride (PF), sodium chloride (PF)               Physical Exam:   Blood pressure 129/75, pulse 80, temperature 98.9  F (37.2  C), temperature source Oral, resp. rate 18, height 1.702 m (5' 7\"), weight 81.1 kg (178 lb 12.7 oz), SpO2 94 %.  Wt Readings from Last 4 Encounters:   21 81.1 kg (178 lb 12.7 oz)   21 83.9 kg (185 lb)   21 83.9 kg (185 lb)   21 84.5 kg (186 lb 4.8 oz)         Vital Sign Ranges  Temperature Temp  Av.2  F (36.8  C)  Min: 97.4  F (36.3  C)  Max: 98.7  F (37.1  C)   Blood pressure Systolic (24hrs), Av , Min:117 , Max:151        Diastolic (24hrs), Av, Min:75, Max:84      Pulse Pulse  Av.8  Min: 62  Max: 90   Respirations Resp  " Av.7  Min: 7  Max: 23   Pulse oximetry SpO2  Av.5 %  Min: 94 %  Max: 99 %         Intake/Output Summary (Last 24 hours) at 2021 0800  Last data filed at 2021 0300  Gross per 24 hour   Intake 300 ml   Output 1250 ml   Net -950 ml       Constitutional: Awake, alert, cooperative, no apparent distress   Lungs: Clear to auscultation bilaterally, no crackles or wheezing   Cardiovascular: Regular rate and rhythm, normal S1 and S2, and no murmur noted   Abdomen: distended, bulging umbilical hernia,soft,non reducible,  diffuse tenderness. +ve bowel sounds   Skin: No rashes, no cyanosis, no edema               Data:   All laboratory data reviewed

## 2021-08-08 NOTE — PLAN OF CARE
Cognitive Concerns/ Orientation : A&Ox4  BEHAVIOR & AGGRESSION TOOL COLOR: Green  CIWA SCORE: 2-0 for nausea   ABNL VS/O2: VSS on room air   MOBILITY: Assist of one with walker/gait belt   PAIN MANAGMENT: Complains of abdominal pain, rates pain at 8.  Oxycodone PRN every 6 hours   DIET: Regular   BOWEL/BLADDER: uses the urinal, no BM  ABNL LAB/BG: Hgb 9.2, Cr 3.1  DRAIN/DEVICES: Peripheral IV right arm infusing Octreotide at 10 ml per hour   TELEMETRY RHYTHM: SR  SKIN: Bruised, scabs, lemuel  TESTS/PROCEDURES: none  D/C DAY/GOALS/PLACE: TBD.  Needs to figure out disposition planning based of continued ETOH use while committed.  OTHER IMPORTANT INFO: Abd is distended, intermittent nausea.  Enteric isolation. DNR-DNI.

## 2021-08-08 NOTE — PLAN OF CARE
Date and Time 8/8/21 0700- 1530  Cognitive Concerns/ Orientation : A&Ox4  BEHAVIOR & AGGRESSION TOOL COLOR: Green  CIWA SCORE: 0  ABNL VS/O2: VSS on room air   MOBILITY: Assist of one with walker/gait belt   PAIN MANAGMENT: abdominal discomfort, no pain meds needed  this shift  DIET: Regular   BOWEL/BLADDER: uses the urinal,  BMx1  ABNL LAB/BG: Hgb 8.7. Cr 3.33  DRAIN/DEVICES: Peripheral IV right arm SL  TELEMETRY RHYTHM: SR  SKIN: Bruised, scabs, lemuel  TESTS/PROCEDURES: none  D/C DAY/GOALS/PLACE: TBD.  Needs to figure out disposition planning based of continued ETOH use while committed.  OTHER IMPORTANT INFO: Abd is distended, intermittent nausea.  Enteric isolation. DNR-DNI. Plan for US guided paracentesis tomorrow, Nephrology consult pending..

## 2021-08-08 NOTE — PROGRESS NOTES
[facility]  Gastroenterology Progress Note     Jim Richard MRN# [id number]   YOB: 1955 Age: 66 year old          Assessment and Plan:     Alcoholic intoxication without complication (H)    Hypoglycemia    Failure to thrive in adult    Diarrhea, unspecified type  Is clinically stable no further signs of bleeding no new GI complaints.  66-year-old gentleman with complicated history of decompensated cirrhosis cirrhosis, persistent alcohol use ascites chronic abdominal pain significantly elevated meld score with history of recent C. difficile.  Continue on supportive care.            Hypoglycemia  Failure to thrive in adult  Alcoholic intoxication without complication (H)  Diarrhea, unspecified type      Interval History:   doing well; no cp, sob, n/v/d, or abd pain.              Review of Systems:   C: NEGATIVE for fever, chills, change in weight  E/M: NEGATIVE for ear, mouth and throat problems  R: NEGATIVE for significant cough or SOB  CV: NEGATIVE for chest pain, palpitations or peripheral edema             Medications:   I have reviewed this patient's current medications    disulfiram  250 mg Oral Daily     finasteride  5 mg Oral Daily     folic acid  1 mg Oral Daily     gabapentin  200 mg Oral TID     lactulose  10 g Oral BID     midodrine  10 mg Oral TID w/meals     mirtazapine  30 mg Oral At Bedtime     multivitamin w/minerals  1 tablet Oral Daily     nicotine  1 patch Transdermal Daily     nicotine   Transdermal Q8H     pantoprazole (PROTONIX) IV  40 mg Intravenous BID     QUEtiapine  200 mg Oral At Bedtime     sodium chloride (PF)  3 mL Intracatheter Q8H     tamsulosin  0.8 mg Oral Daily     thiamine  100 mg Oral Daily     traZODone  100 mg Oral At Bedtime     vancomycin  125 mg Oral 4x Daily     vortioxetine  10 mg Oral BID                  Physical Exam:   Vitals were reviewed  Vital Signs with Ranges  Temp:  [97.9  F (36.6  C)-98.7  F (37.1  C)] 97.9  F (36.6   C)  Pulse:  [71-82] 71  Resp:  [12-18] 18  BP: (117-155)/(77-91) 125/81  SpO2:  [94 %-99 %] 97 %  I/O last 3 completed shifts:  In: 720 [P.O.:720]  Out: 1200 [Urine:1200]  Constitutional: healthy, alert and no distress   Cardiovascular: negative, PMI normal. No lifts, heaves, or thrills. RRR. No murmurs, clicks gallops or rub  Respiratory: negative, Percussion normal. Good diaphragmatic excursion. Lungs clear  Head: Normocephalic. No masses, lesions, tenderness or abnormalities  Neck: Neck supple. No adenopathy. Thyroid symmetric, normal size,, Carotids without bruits.  Abdomen: Abdomen soft, non-tender. BS normal. No masses, organomegaly  SKIN: no suspicious lesions or rashes           Data:   I reviewed the patient's new clinical lab test results.   Recent Labs   Lab Test 08/07/21  1159 08/06/21  1157 08/05/21  0532 08/05/21  0008 08/03/21 2017 06/27/21  0803 06/25/21  1931   WBC  --  2.8* 2.9* 2.8* 5.5   < > 7.6   HGB 9.2* 8.6* 8.3* 7.7* 8.5*   < > 8.4*   MCV  --  95 94 96 98   < > 96   PLT  --  78* 85* 82* 148*   < > 231   INR  --  1.39*  --   --  1.36*  --  1.24*    < > = values in this interval not displayed.     Recent Labs   Lab Test 08/07/21  0607 08/06/21  1157 08/05/21  0532   POTASSIUM 4.4 4.5 4.4   CHLORIDE 108 107 107   CO2 18* 20 22   BUN 38* 39* 40*   ANIONGAP 7 5 4     Recent Labs   Lab Test 08/05/21  0532 08/04/21  1153 08/03/21  2325 08/03/21  2017 07/10/21  0714 07/07/21  0420 07/06/21  0732 07/04/21  0809 06/16/21  1123 06/13/21  1150 04/04/21  1017 03/26/21  2131   ALBUMIN  --  2.8*  --  2.9* 2.8*  --  2.9* 2.9*  --   --    < > 2.7*   BILITOTAL  --   --   --  1.0  --   --  0.5 1.0   < >  --    < > 2.2*   ALT  --   --   --  17  --   --  20 22   < >  --    < > 26   AST  --   --   --  43  --   --  21 26   < >  --    < > 36   PROTEIN  --   --  100 *  --   --  Negative  --   --   --  Negative   < >  --    LIPASE 228  --   --  604*  --   --   --   --   --   --   --  444*    < > = values in this  interval not displayed.       I reviewed the patient's new imaging results.    All laboratory data reviewed  All imaging studies reviewed by me.    Rosendo Shaw MD,  8/7/2021  [facility]  Office : [phone]  Cell: [phone]

## 2021-08-09 ENCOUNTER — APPOINTMENT (OUTPATIENT)
Dept: PHYSICAL THERAPY | Facility: CLINIC | Age: 66
DRG: 981 | End: 2021-08-09
Payer: MEDICARE

## 2021-08-09 ENCOUNTER — APPOINTMENT (OUTPATIENT)
Dept: ULTRASOUND IMAGING | Facility: CLINIC | Age: 66
DRG: 981 | End: 2021-08-09
Attending: INTERNAL MEDICINE
Payer: MEDICARE

## 2021-08-09 LAB
ALBUMIN SERPL-MCNC: 3.2 G/DL (ref 3.4–5)
ALP SERPL-CCNC: 118 U/L (ref 40–150)
ALT SERPL W P-5'-P-CCNC: 18 U/L (ref 0–70)
ANION GAP SERPL CALCULATED.3IONS-SCNC: 9 MMOL/L (ref 3–14)
ANION GAP SERPL CALCULATED.3IONS-SCNC: 9 MMOL/L (ref 3–14)
AST SERPL W P-5'-P-CCNC: 19 U/L (ref 0–45)
BACTERIA FLD CULT: NO GROWTH
BILIRUB SERPL-MCNC: 0.6 MG/DL (ref 0.2–1.3)
BUN SERPL-MCNC: 45 MG/DL (ref 7–30)
BUN SERPL-MCNC: 45 MG/DL (ref 7–30)
CALCIUM SERPL-MCNC: 8 MG/DL (ref 8.5–10.1)
CALCIUM SERPL-MCNC: 8.1 MG/DL (ref 8.5–10.1)
CHLORIDE BLD-SCNC: 107 MMOL/L (ref 94–109)
CHLORIDE BLD-SCNC: 108 MMOL/L (ref 94–109)
CO2 SERPL-SCNC: 17 MMOL/L (ref 20–32)
CO2 SERPL-SCNC: 18 MMOL/L (ref 20–32)
CREAT SERPL-MCNC: 3.43 MG/DL (ref 0.66–1.25)
CREAT SERPL-MCNC: 3.44 MG/DL (ref 0.66–1.25)
GFR SERPL CREATININE-BSD FRML MDRD: 17 ML/MIN/1.73M2
GFR SERPL CREATININE-BSD FRML MDRD: 17 ML/MIN/1.73M2
GLUCOSE BLD-MCNC: 106 MG/DL (ref 70–99)
GLUCOSE BLD-MCNC: 108 MG/DL (ref 70–99)
POTASSIUM BLD-SCNC: 4.3 MMOL/L (ref 3.4–5.3)
POTASSIUM BLD-SCNC: 4.4 MMOL/L (ref 3.4–5.3)
PROT SERPL-MCNC: 6.4 G/DL (ref 6.8–8.8)
SODIUM SERPL-SCNC: 134 MMOL/L (ref 133–144)
SODIUM SERPL-SCNC: 134 MMOL/L (ref 133–144)

## 2021-08-09 PROCEDURE — 250N000011 HC RX IP 250 OP 636: Performed by: INTERNAL MEDICINE

## 2021-08-09 PROCEDURE — 80048 BASIC METABOLIC PNL TOTAL CA: CPT | Performed by: INTERNAL MEDICINE

## 2021-08-09 PROCEDURE — 89051 BODY FLUID CELL COUNT: CPT | Performed by: INTERNAL MEDICINE

## 2021-08-09 PROCEDURE — P9047 ALBUMIN (HUMAN), 25%, 50ML: HCPCS | Performed by: INTERNAL MEDICINE

## 2021-08-09 PROCEDURE — 97116 GAIT TRAINING THERAPY: CPT | Mod: GP | Performed by: PHYSICAL THERAPIST

## 2021-08-09 PROCEDURE — 97110 THERAPEUTIC EXERCISES: CPT | Mod: GP | Performed by: PHYSICAL THERAPIST

## 2021-08-09 PROCEDURE — 99233 SBSQ HOSP IP/OBS HIGH 50: CPT | Performed by: INTERNAL MEDICINE

## 2021-08-09 PROCEDURE — 999N000154 HC STATISTIC RADIOLOGY XRAY, US, CT, MAR, NM

## 2021-08-09 PROCEDURE — 82042 OTHER SOURCE ALBUMIN QUAN EA: CPT | Performed by: INTERNAL MEDICINE

## 2021-08-09 PROCEDURE — 272N000706 US PARACENTESIS

## 2021-08-09 PROCEDURE — 250N000013 HC RX MED GY IP 250 OP 250 PS 637: Performed by: INTERNAL MEDICINE

## 2021-08-09 PROCEDURE — 99232 SBSQ HOSP IP/OBS MODERATE 35: CPT | Performed by: INTERNAL MEDICINE

## 2021-08-09 PROCEDURE — 87070 CULTURE OTHR SPECIMN AEROBIC: CPT | Performed by: INTERNAL MEDICINE

## 2021-08-09 PROCEDURE — 120N000001 HC R&B MED SURG/OB

## 2021-08-09 PROCEDURE — 36415 COLL VENOUS BLD VENIPUNCTURE: CPT | Performed by: INTERNAL MEDICINE

## 2021-08-09 PROCEDURE — 250N000009 HC RX 250: Performed by: INTERNAL MEDICINE

## 2021-08-09 RX ORDER — LIDOCAINE 40 MG/G
CREAM TOPICAL
Status: DISCONTINUED | OUTPATIENT
Start: 2021-08-09 | End: 2021-08-10

## 2021-08-09 RX ORDER — ALBUMIN (HUMAN) 12.5 G/50ML
12.5 SOLUTION INTRAVENOUS ONCE
Status: DISCONTINUED | OUTPATIENT
Start: 2021-08-09 | End: 2021-08-13

## 2021-08-09 RX ORDER — LIDOCAINE HYDROCHLORIDE 10 MG/ML
10 INJECTION, SOLUTION EPIDURAL; INFILTRATION; INTRACAUDAL; PERINEURAL ONCE
Status: COMPLETED | OUTPATIENT
Start: 2021-08-09 | End: 2021-08-09

## 2021-08-09 RX ADMIN — TAMSULOSIN HYDROCHLORIDE 0.8 MG: 0.4 CAPSULE ORAL at 08:23

## 2021-08-09 RX ADMIN — MIDODRINE HYDROCHLORIDE 10 MG: 5 TABLET ORAL at 13:38

## 2021-08-09 RX ADMIN — MIRTAZAPINE 30 MG: 15 TABLET, FILM COATED ORAL at 21:46

## 2021-08-09 RX ADMIN — ONDANSETRON 4 MG: 2 INJECTION INTRAMUSCULAR; INTRAVENOUS at 04:38

## 2021-08-09 RX ADMIN — ACETAMINOPHEN 650 MG: 325 TABLET, FILM COATED ORAL at 21:45

## 2021-08-09 RX ADMIN — GABAPENTIN 200 MG: 100 CAPSULE ORAL at 08:22

## 2021-08-09 RX ADMIN — GABAPENTIN 200 MG: 100 CAPSULE ORAL at 20:36

## 2021-08-09 RX ADMIN — VANCOMYCIN HYDROCHLORIDE 125 MG: 125 CAPSULE ORAL at 17:00

## 2021-08-09 RX ADMIN — PANTOPRAZOLE SODIUM 40 MG: 40 TABLET, DELAYED RELEASE ORAL at 17:00

## 2021-08-09 RX ADMIN — OXYCODONE HYDROCHLORIDE 5 MG: 5 TABLET ORAL at 04:38

## 2021-08-09 RX ADMIN — QUETIAPINE FUMARATE 200 MG: 200 TABLET ORAL at 21:45

## 2021-08-09 RX ADMIN — OCTREOTIDE ACETATE 200 MCG: 100 INJECTION, SOLUTION INTRAVENOUS; SUBCUTANEOUS at 17:00

## 2021-08-09 RX ADMIN — NICOTINE 1 PATCH: 14 PATCH, EXTENDED RELEASE TRANSDERMAL at 08:25

## 2021-08-09 RX ADMIN — OCTREOTIDE ACETATE 200 MCG: 100 INJECTION, SOLUTION INTRAVENOUS; SUBCUTANEOUS at 21:45

## 2021-08-09 RX ADMIN — ONDANSETRON 4 MG: 4 TABLET, ORALLY DISINTEGRATING ORAL at 17:16

## 2021-08-09 RX ADMIN — VANCOMYCIN HYDROCHLORIDE 125 MG: 125 CAPSULE ORAL at 20:36

## 2021-08-09 RX ADMIN — VANCOMYCIN HYDROCHLORIDE 125 MG: 125 CAPSULE ORAL at 13:38

## 2021-08-09 RX ADMIN — FOLIC ACID 1 MG: 1 TABLET ORAL at 08:22

## 2021-08-09 RX ADMIN — MULTIPLE VITAMINS W/ MINERALS TAB 1 TABLET: TAB at 08:23

## 2021-08-09 RX ADMIN — ONDANSETRON 4 MG: 4 TABLET, ORALLY DISINTEGRATING ORAL at 11:02

## 2021-08-09 RX ADMIN — PANTOPRAZOLE SODIUM 40 MG: 40 TABLET, DELAYED RELEASE ORAL at 08:23

## 2021-08-09 RX ADMIN — DISULFIRAM 250 MG: 250 TABLET ORAL at 08:22

## 2021-08-09 RX ADMIN — FINASTERIDE 5 MG: 5 TABLET, FILM COATED ORAL at 08:23

## 2021-08-09 RX ADMIN — VANCOMYCIN HYDROCHLORIDE 125 MG: 125 CAPSULE ORAL at 08:23

## 2021-08-09 RX ADMIN — LACTULOSE 10 G: 20 SOLUTION ORAL at 08:20

## 2021-08-09 RX ADMIN — MIDODRINE HYDROCHLORIDE 10 MG: 5 TABLET ORAL at 17:00

## 2021-08-09 RX ADMIN — TRAZODONE HYDROCHLORIDE 100 MG: 100 TABLET ORAL at 21:45

## 2021-08-09 RX ADMIN — LIDOCAINE HYDROCHLORIDE 10 ML: 10 INJECTION, SOLUTION EPIDURAL; INFILTRATION; INTRACAUDAL; PERINEURAL at 13:07

## 2021-08-09 RX ADMIN — MIDODRINE HYDROCHLORIDE 10 MG: 5 TABLET ORAL at 08:23

## 2021-08-09 RX ADMIN — VORTIOXETINE 10 MG: 10 TABLET, FILM COATED ORAL at 08:23

## 2021-08-09 RX ADMIN — GABAPENTIN 200 MG: 100 CAPSULE ORAL at 13:38

## 2021-08-09 RX ADMIN — OXYCODONE HYDROCHLORIDE 5 MG: 5 TABLET ORAL at 11:02

## 2021-08-09 RX ADMIN — VORTIOXETINE 10 MG: 10 TABLET, FILM COATED ORAL at 20:36

## 2021-08-09 RX ADMIN — PHYTONADIONE 5 MG: 10 INJECTION, EMULSION INTRAMUSCULAR; INTRAVENOUS; SUBCUTANEOUS at 04:38

## 2021-08-09 RX ADMIN — ALBUMIN HUMAN 25 G: 0.25 SOLUTION INTRAVENOUS at 08:24

## 2021-08-09 RX ADMIN — OXYCODONE HYDROCHLORIDE 5 MG: 5 TABLET ORAL at 17:16

## 2021-08-09 RX ADMIN — LACTULOSE 10 G: 20 SOLUTION ORAL at 20:36

## 2021-08-09 RX ADMIN — OCTREOTIDE ACETATE 200 MCG: 100 INJECTION, SOLUTION INTRAVENOUS; SUBCUTANEOUS at 08:39

## 2021-08-09 ASSESSMENT — ACTIVITIES OF DAILY LIVING (ADL)
ADLS_ACUITY_SCORE: 14
ADLS_ACUITY_SCORE: 13

## 2021-08-09 NOTE — PROGRESS NOTES
[facility] [place]  Gastroenterology Progress Note     Jim Richard MRN# [id number]   YOB: 1955 Age: 66 year old          Assessment and Plan:   Jim Richard is a pleasant 66 year old male with alcoholic liver cirrhosis complicated with esophageal varices, abdominal ascites, hepatorenal syndrome, pancytopenia and c/o abdominal pain. GI consulted on 6/7/21.     Active Problems:  Alcoholic liver cirrhosis  Alcholic intoxication  Elevated Creatinine- hepatorenal syndrome  Abdominal pain  Abdominal ascites- concern for SBP  Hypoalbuminemia  Pancytopenia  Patient has suffered from chronic alcoholism and developed significant complications with decompensated liver cirrhosis. He has required paracentesis every 2 weeks. Has had elevating creatinine- currently 3.43 well above admission creatinine at 2.18. He has type 2 hepatorenal syndrome. Has pancytopenia due to bone marrow suppression.  Albumin 2.8 (last checked on 8/4)  Platelets 108 Hemoglobin 6.9->7.7->8.5->8.3->8.7  INR 1.34 Tbili 1.0, Creatinine 3.43, MELD score 23 ( 20% mortality)  Last paracentesis done 8/4 and removed 6.7 L of fluid- ordered paracentesis for today     -- Hemoglobin stable no plans for endoscopic procedure at this time  -- Continue midodrine and pantoprazole  -- Consider TIPS procedure in near future  -- Monitor CBC and CMP  -- Regular diet  -- Continue albumin until 3.5 - not ordered today- ordered to run off collected specimen  -- With worsening creatinine, started on albumin, octreotide TID, continued on midodrine and started on trintellix. Nephrology consulted    C Difficile diarrhea  Continue vancomycin 125 mg QID  Diarrhea improved              Interval History:   no new complaints and doing well; no cp, sob, n/v, diarrhea improved, has abdominal discomfort from abdominal distention              Review of Systems:   C: NEGATIVE for fever, chills, change in weight  E/M: NEGATIVE for ear, mouth and  throat problems  R: NEGATIVE for significant cough or SOB  CV: NEGATIVE for chest pain, palpitations or peripheral edema             Medications:   I have reviewed this patient's current medications    albumin human  25 g Intravenous Daily     disulfiram  250 mg Oral Daily     finasteride  5 mg Oral Daily     folic acid  1 mg Oral Daily     gabapentin  200 mg Oral TID     lactulose  10 g Oral BID     midodrine  10 mg Oral TID w/meals     mirtazapine  30 mg Oral At Bedtime     multivitamin w/minerals  1 tablet Oral Daily     nicotine  1 patch Transdermal Daily     nicotine   Transdermal Q8H     octreotide  200 mcg Intravenous TID     pantoprazole  40 mg Oral BID AC     QUEtiapine  200 mg Oral At Bedtime     sodium chloride (PF)  3 mL Intracatheter Q8H     tamsulosin  0.8 mg Oral Daily     traZODone  100 mg Oral At Bedtime     vancomycin  125 mg Oral 4x Daily     vortioxetine  10 mg Oral BID                  Physical Exam:   Vitals were reviewed  Vital Signs with Ranges  Temp:  [97.8  F (36.6  C)-98.4  F (36.9  C)] 97.8  F (36.6  C)  Pulse:  [70-77] 77  Resp:  [18-20] 20  BP: (120-147)/(65-70) 130/68  SpO2:  [92 %-96 %] 92 %  I/O last 3 completed shifts:  In: 360 [P.O.:360]  Out: 800 [Urine:800]  Constitutional: healthy, alert and no distress   Cardiovascular: negative, PMI normal. No lifts, heaves, or thrills. RRR. No murmurs, clicks gallops or rub  Respiratory: negative, Percussion normal. Good diaphragmatic excursion. Lungs clear  Abdomen: Abdomen firm, distended, mildly tender. BS normal. No masses, organomegaly           Data:   I reviewed the patient's new clinical lab test results.   Recent Labs   Lab Test 08/08/21  1305 08/07/21  1159 08/06/21  1157 08/05/21  0532 08/03/21 2017   WBC 5.0  --  2.8* 2.9* 5.5   HGB 8.7* 9.2* 8.6* 8.3* 8.5*   MCV 97  --  95 94 98   *  --  78* 85* 148*   INR 1.34*  --  1.39*  --  1.36*     Recent Labs   Lab Test 08/09/21  0808 08/08/21  1305 08/07/21  0607   POTASSIUM 4.3  4.2 4.4   CHLORIDE 107 106 108   CO2 18* 17* 18*   BUN 45* 44* 38*   ANIONGAP 9 8 7     Recent Labs   Lab Test 08/05/21  0532 08/04/21  1153 08/03/21  2325 08/03/21  2017 07/10/21  0714 07/07/21  0420 07/06/21  0732 07/04/21  0809 06/16/21  1123 06/13/21  1150 04/04/21  1017 03/26/21  2131   ALBUMIN  --  2.8*  --  2.9* 2.8*  --  2.9* 2.9*  --   --    < > 2.7*   BILITOTAL  --   --   --  1.0  --   --  0.5 1.0   < >  --    < > 2.2*   ALT  --   --   --  17  --   --  20 22   < >  --    < > 26   AST  --   --   --  43  --   --  21 26   < >  --    < > 36   PROTEIN  --   --  100 *  --   --  Negative  --   --   --  Negative   < >  --    LIPASE 228  --   --  604*  --   --   --   --   --   --   --  444*    < > = values in this interval not displayed.       I reviewed the patient's new imaging results.    All laboratory data reviewed  All imaging studies reviewed by me.    Gloria De Leon PA-C,  8/9/2021  [facility]  Office : [phone]  Cell: [phone] (Dr. Shaw)  Cell: [phone] (Gloria De Leon PA-C)

## 2021-08-09 NOTE — PROGRESS NOTES
[facility]    Medicine Progress Note - Hospitalist Service       Date of Admission:  8/3/2021    Assessment & Plan           Jim Richard is a 66-year-old gentleman with a complex past medical history of alcohol abuse/dependence complicated by alcoholic liver disease with ascites, status post multiple paracentesis in the past, hepatorenal syndrome, chronic kidney disease stage IV, history of recent bacteremia due to Enterococcus, hypertension, COPD, tobacco use disorder, depression/anxiety, thrombocytopenia and obstructive sleep apnea, who presented to ER for evaluation of generalized weakness, diarrhea and dry heaves.     Decompensated alcoholic liver cirrhosis with recurrent ascites  Portal Hypertension:  Possible hepatorenal syndrome with CKD stage IV  Patient has been having issues with recurrent ascites requiring multiple paracenteses.  It seems that last paracentesis was on 07/26/2021 when 8.7 liters of fluid was removed before that had paracentesis on 07/2015 with 5.2 liters fluid removed.  It looks like his prior ascites fluid analysis was suggestive of SBP.   Presents with recurrent abdominal distention  * Paracentesis (8/4):  Removed over 6 L and given Albumin (SAAG 1.9 consistent with portal Hypertension). Fluid wbc 201 not indicative of SBP.  Patient however reports worsening abdominal pain today, is being planned for repeat paracentesis, will recent fluid for cell count and Gram stain and culture  -Continue PTA Lactulose  - discontinued IMC & transfer to medical floor today  - GI following and appreciate their recommendations.  - diet changed to low salt. 2/2 elevated creatinine not on fluid restriction  -Baseline Cr function around 2.  Was 2.18 on admission but gradually worsening.    - PTA diuretics has been on hold  -Continue octreotide 200 mcg IV 3 times daily + Midodrin 10 mg TID + Trintellix 10 mg bid   - diarrhea improved   -Appreciate GI and nephrology input.     C  Diff colitis.  Improved   Patient reports diarrhea for about a week prior to presentation.  C diff PCR positive in the ED   No further diarrhea per patient for last several days  - continue PO Vancomycin / enteric precautions  - Continue to monitor stool output      Alcoholism/alcohol dependence  Alcohol intoxication  History of alcohol withdrawals  Patient had a recent hospitalization at [facility] from where he was discharged to TCU.  After he returned back home, he started drinking again.  He admits he drinks 1 pint of vodka daily.  He stated that he has a .  His ethanol level is 0.23.    - Continue multivitamins, folic acid and thiamine  - CIWA protocol in place  - Psychiatry consulted   - Patient is supposedly already committed and social work is following to help with disposition      Acute on chronic anemia   Hemoglobin dropped to a fabiola of 6.9.  Status post 1 unit PRBC transfused 8/5  -Hemoglobin has remained stable around 8.5 since transfusion  - No further GI bleed  - Conditional order to transfuse for hgb <7   - GI following now and appreciate their recommendations.  No plans for endoscopy at this time       Atypical chest pain. Resolved   Patient complained of chest pain since arrival to the ED.  Troponins negative.  EKG without evidence of acute ischemia  - Continue to monitor      Generalized weakness  Failure to thrive  This is likely multifactorial from his chronic alcohol use, alcoholic liver cirrhosis, poor oral intake, and recent diarrhea.    - PT & /care coordinator consulted     Hypoglycemia  - resolved  Blood sugars were in the 40s upon arrival in the ER.  This is likely due to poor oral intake.  He was given a couple of doses of D50 with improvement of his blood sugars.    - Hypoglycemia protocol in place  - BS > 100.Monitor for now      Tobacco use disorder  - Nicotine patch had been ordered     Recent Enterococcus bacteremia  He had been treated with  ampicillin IV for 2 weeks as per ID recommendations.  He denies any fevers or leukocytosis at this time.     H/o BPH  - PTA Flomax and Proscar     History of COPD  This does not seem to be an acute issue at this time.    - PTA inhalers      History of depression/anxiety  - PTA medications resumed      Obstructive sleep apnea   Noncompliant with CPAP.     Thrombocytopenia, chronic, related to his chronic liver disease  There is no evidence of active bleeding.  Platelet count on admission was 148.    - stable -108 today. Will monitor it periodically       Diet: Regular Diet Adult  Snacks/Supplements Adult: Ensure Enlive; Between Meals    DVT Prophylaxis: Pneumatic Compression Devices  Leger Catheter: Not present  Central Lines: None  Code Status: No CPR- Do NOT Intubate      Disposition Plan   Expected discharge: 08/12/2021 likely will need transitional care unit, pending PT evaluation once safe disposition plan/ TCU bed available and Cleared by all consultants.     The patient's care was discussed with the Bedside Nurse and Patient.    Kassandra Lew MD  Hospitalist Service  [facility]  Securely message with the Vocera Web Console (learn more here)  Text page via [employer] Paging/Directory      Clinically Significant Risk Factors Present on Admission                ______________________________________________________________________    Interval History   Reports abdominal discomfort/distention.  Denies any nausea vomiting.  No new nursing concerns.    Data reviewed today: I reviewed all medications, new labs and imaging results over the last 24 hours.    Physical Exam   Vital Signs: Temp: 97.8  F (36.6  C) Temp src: Oral BP: 130/68 Pulse: 77   Resp: 20 SpO2: 92 % O2 Device: None (Room air)    Weight: 178 lbs 12.69 oz  Exam:  Constitutional: Awake, alert and no distress. Appears uncomfortable due to abdominal pain and distention  Head: Normocephalic. No masses, lesions, tenderness or  abnormalities  ENT: ENT exam normal, no neck nodes or sinus tenderness  Cardiovascular: RRR.  No murmurs, no rubs or JVD  Respiratory: Normal WOB,b/l equal air entry, no wheezes or crackles   Gastrointestinal: Abdomen distended, tender to palpate. BS normal. No masses, organomegaly  : Deferred  Extremities : 1+ bilateral edema , no clubbing or cyanosis      Data   Recent Labs   Lab 08/09/21  0808 08/08/21  1305 08/07/21  2101 08/07/21  1159 08/07/21  0607 08/06/21  1157 08/06/21  1157 08/05/21  0532 08/04/21  1635 08/04/21  1427 08/04/21  1153 08/03/21 2050 08/03/21 2017   WBC  --  5.0  --   --   --   --  2.8* 2.9*   < >  --   --    < > 5.5   HGB  --  8.7*  --  9.2*  --   --  8.6* 8.3*   < >  --   --    < > 8.5*   MCV  --  97  --   --   --   --  95 94   < >  --   --    < > 98   PLT  --  108*  --   --   --   --  78* 85*   < >  --   --    < > 148*   INR  --  1.34*  --   --   --   --  1.39*  --   --   --   --   --  1.36*    131*  --   --  133  --  132* 133  --   --   --    < > 139   POTASSIUM 4.3 4.2  --   --  4.4  --  4.5 4.4  --   --   --    < > 4.0   CHLORIDE 107 106  --   --  108  --  107 107  --   --   --    < > 111*   CO2 18* 17*  --   --  18*  --  20 22  --   --   --    < > 14*   BUN 45* 44*  --   --  38*  --  39* 40*  --   --   --    < > 43*   CR 3.43* 3.33*  --   --  3.19*  --  3.13* 2.57*  --   --   --    < > 2.18*   ANIONGAP 9 8  --   --  7  --  5 4  --   --   --    < > 14   KEV 8.1* 7.8*  --   --  8.2*  --  8.2* 8.4*  --   --   --    < > 8.3*   * 175* 101*  --  122*   < > 164* 114*  --   --   --    < > 52*   ALBUMIN  --   --   --   --   --   --   --   --   --   --  2.8*  --  2.9*   PROTTOTAL  --   --   --   --   --   --   --   --   --   --   --   --  7.0   BILITOTAL  --   --   --   --   --   --   --   --   --   --   --   --  1.0   ALKPHOS  --   --   --   --   --   --   --   --   --   --   --   --  176*   ALT  --   --   --   --   --   --   --   --   --   --   --   --  17   AST  --   --   --    --   --   --   --   --   --   --   --   --  43   LIPASE  --   --   --   --   --   --   --  228  --   --   --   --  604*   TROPONIN  --   --   --   --   --   --   --   --   --  <0.015  --   --  0.016    < > = values in this interval not displayed.     No results found for this or any previous visit (from the past 24 hour(s)).  Medications       albumin human  25 g Intravenous Daily     disulfiram  250 mg Oral Daily     finasteride  5 mg Oral Daily     folic acid  1 mg Oral Daily     gabapentin  200 mg Oral TID     lactulose  10 g Oral BID     midodrine  10 mg Oral TID w/meals     mirtazapine  30 mg Oral At Bedtime     multivitamin w/minerals  1 tablet Oral Daily     nicotine  1 patch Transdermal Daily     nicotine   Transdermal Q8H     octreotide  200 mcg Intravenous TID     pantoprazole  40 mg Oral BID AC     QUEtiapine  200 mg Oral At Bedtime     sodium chloride (PF)  3 mL Intracatheter Q8H     tamsulosin  0.8 mg Oral Daily     traZODone  100 mg Oral At Bedtime     vancomycin  125 mg Oral 4x Daily     vortioxetine  10 mg Oral BID

## 2021-08-09 NOTE — CONSULTS
Nephrology Initial Consult  August 9, 2021      Jim Richard MRN:[id number] YOB: 1955  Date of Admission:8/3/2021  Primary care provider: Talon Elias  Requesting physician: Azul Hannah MD    ASSESSMENT AND RECOMMENDATIONS:   1 CKD 4 at baseline-widely fluctuating creatinine around 2.5-3 at baseline.  Likely related to multiple acute kidney injury episodes and hepatorenal physiology.  Chaves urine at baseline.    2 acute kidney injury-likely secondary hepatorenal syndrome.  Needs frequent large-volume paracentesis.  Continues on octreotide drip, midodrine.  Received albumin boluses without improvement in renal function.  Urinalysis shows trace protein.  Dipstick protein is 2+ compared to usual negative but likely overestimated on dipstick in NGUYEN.    3 end-stage liver disease with portal hypertension-esophageal varices, frequent paracentesis need.  -Noted plans for paracentesis today.    4 FEN-bicarb is low at 18.  Possibly contributed by respiratory alkalosis with liver failure as well.  No indication for bicarbonate replacement.    5 C. difficile colitis-on p.o. vancomycin.  Diarrhea improved.    6 acute on chronic anemia-concern for GI bleed given history of esophageal varices.  GI on board.  Getting PRBC as needed.  No plans for endoscopy at this time.    7 thrombocytopenia-secondary to liver disease.    Discussion / Recc -   - Generally poor prognosis. Has not responded to HRS directed therapy.   Needs paracentesis again today . Try to keep fluid removal to 5 L or less. Replace Albumin, 12.5 g per L removed.   Continue Midodrine, Octreotide.   Poor candidate for dialysis if kidney function continues to worsen , given HRS/ not a txp candidate, poor compliance with all therapies.     Thank you for the consult. Will continue to follow along with you .          Alpesh Cifuentes MD  [facility] - Nephrology   [phone]        REASON FOR CONSULT: Acute on chronic  kidney injury    HISTORY OF PRESENT ILLNESS:  Jim Richard is a 66 year old gentleman with decompensated alcoholic end-stage liver disease with portal hypertension, esophageal varices, recalcitrant ascites, numerous admissions for alcoholism, sleep apnea    Most recently admitted from 6/25-7/14.  Continue to drink a pint of vodka every day on discharge.  Noncompliant with medications and follow-up.  He presented to ER on 8/5 with complaints of generalized weakness, dry heaves, diarrhea.  Found to be C. difficile positive.    We have been consulted for worsening renal function.  Creatinine trend reviewed.  Multiple acute kidney injury episodes in recent times likely related to liver failure and paracentesis.  Creatinine has been running around 2.5-2.9.  Presented with a creatinine of 2.2.  Now up to 3.4.  Had paracentesis on 8/4 with over 6 L removed.  Was on diuretics PTA which have been held secondary to diarrhea.  Started on octreotide drip on 8/4.  Received albumin as well as on midodrine.  Urinalysis shows trace protein.  Dipstick protein is 2+ compared to usual negative but likely overestimated on dipstick in NGUYEN.    PAST MEDICAL HISTORY:  Reviewed with patient on 08/09/2021  and is as listed in HPI.       MEDICATIONS:  PTA Meds  Prior to Admission medications    Medication Sig Last Dose Taking? Auth Provider   albuterol (PROAIR HFA/PROVENTIL HFA/VENTOLIN HFA) 108 (90 Base) MCG/ACT inhaler Inhale 2 puffs into the lungs every 4 hours as needed for shortness of breath / dyspnea or wheezing as needed Yes Gray Burns MD   disulfiram (ANTABUSE) 250 MG tablet Take 1 tablet (250 mg) by mouth daily Past Week at Unknown time Yes Gray Burns MD   finasteride (PROSCAR) 5 MG tablet Take 1 tablet (5 mg) by mouth daily Past Week at Unknown time Yes Gray Burns MD   fluticasone-vilanterol (BREO ELLIPTA) 200-25 MCG/INH inhaler Inhale 1 puff into the lungs daily as needed (SOB) as  needed Yes Jeffrey Villagomez MD   gabapentin (NEURONTIN) 100 MG capsule Take 2 capsules (200 mg) by mouth 3 times daily Past Week at Unknown time Yes Gray Burns MD   hydrOXYzine (ATARAX) 50 MG tablet Take 50 mg by mouth 3 times daily as needed  Past Week at Unknown time Yes Unknown, Entered By History   lactulose (CHRONULAC) 10 GM/15ML solution Take 15 mLs (10 g) by mouth 2 times daily Past Week at Unknown time Yes Gray Burns MD   miconazole (MICATIN) 2 % external powder Apply topically 2 times daily Past Week at Unknown time Yes Gray Burns MD   midodrine (PROAMATINE) 10 MG tablet Take 1 tablet (10 mg) by mouth 3 times daily (with meals) Past Week at Unknown time Yes Gray Burns MD   mirtazapine (REMERON) 30 MG tablet Take 30 mg by mouth At Bedtime Filled 1/11/21 for #30 Past Week at Unknown time Yes Unknown, Entered By History   multivitamin w/minerals (THERA-VIT-M) tablet Take 1 tablet by mouth daily Past Week at Unknown time Yes Jeffrey Villagomez MD   ondansetron (ZOFRAN-ODT) 4 MG ODT tab Take 1 tablet (4 mg) by mouth every 6 hours as needed for nausea or vomiting as needed Yes Gray Burns MD   oxyCODONE (ROXICODONE) 5 MG tablet Take 0.5 tablets (2.5 mg) by mouth every 6 hours as needed for moderate to severe pain Past Month at Unknown time Yes Gray Burns MD   pantoprazole (PROTONIX) 40 MG EC tablet Take 1 tablet (40 mg) by mouth 2 times daily Past Week at Unknown time Yes Sara Orellana MD   QUEtiapine (SEROQUEL) 200 MG tablet Take 200 mg by mouth At Bedtime Filled 1/11/21 #30  Past Week at Unknown time Yes Unknown, Entered By History   QUEtiapine (SEROQUEL) 50 MG tablet Take 1 tablet (50 mg) by mouth 3 times daily as needed (anxiety) Filled 11/20/20 #30 as needed Yes Sara Orellana MD   spironolactone (ALDACTONE) 25 MG tablet Take 1 tablet (25 mg) by mouth 2 times daily Past Week at Unknown time Yes  Gray Burns MD   tamsulosin (FLOMAX) 0.4 MG capsule Take 2 capsules (0.8 mg) by mouth daily Past Week at Unknown time Yes Sara Orellana MD   traZODone (DESYREL) 100 MG tablet Take 1 tablet (100 mg) by mouth At Bedtime Past Week at Unknown time Yes Gray Burns MD   vortioxetine (TRINTELLIX) 10 MG tablet Take 10 mg by mouth 2 times daily Past Week at Unknown time Yes Unknown, Entered By History      Current Meds    albumin human  25 g Intravenous Daily     disulfiram  250 mg Oral Daily     finasteride  5 mg Oral Daily     folic acid  1 mg Oral Daily     gabapentin  200 mg Oral TID     lactulose  10 g Oral BID     midodrine  10 mg Oral TID w/meals     mirtazapine  30 mg Oral At Bedtime     multivitamin w/minerals  1 tablet Oral Daily     nicotine  1 patch Transdermal Daily     nicotine   Transdermal Q8H     octreotide  200 mcg Intravenous TID     pantoprazole  40 mg Oral BID AC     QUEtiapine  200 mg Oral At Bedtime     sodium chloride (PF)  3 mL Intracatheter Q8H     tamsulosin  0.8 mg Oral Daily     traZODone  100 mg Oral At Bedtime     vancomycin  125 mg Oral 4x Daily     vortioxetine  10 mg Oral BID     Infusion Meds      ALLERGIES:    Allergies   Allergen Reactions     Amlodipine Swelling     Lisinopril      Other reaction(s): Angioedema  Mouth and tongue swelling   Mouth and tongue swelling          REVIEW OF SYSTEMS:  A comprehensive of systems was negative except as noted above.    SOCIAL HISTORY:   Reviewed with patient on 08/09/2021     FAMILY MEDICAL HISTORY:   Family History   Problem Relation Age of Onset     Mental Illness Son      Coronary Artery Disease Early Onset Mother      Substance Abuse Father      Lung Cancer Father      Substance Abuse Brother      Unknown/Adopted No family hx of      Depression No family hx of      Anxiety Disorder No family hx of      Schizophrenia No family hx of      Bipolar Disorder No family hx of      Suicide No family hx of      Dementia  "No family hx of      Anthony Disease No family hx of      Parkinsonism No family hx of      Autism Spectrum Disorder No family hx of      Intellectual Disability (Mental Retardation) No family hx of      Reviewed with patient on 2021     PHYSICAL EXAM:   Temp  Av.4  F (36.9  C)  Min: 97.4  F (36.3  C)  Max: 99  F (37.2  C)      Pulse  Av.3  Min: 62  Max: 93 Resp  Av.3  Min: 7  Max: 39  SpO2  Av.9 %  Min: 82 %  Max: 99 %       /68 (BP Location: Left arm)   Pulse 77   Temp 97.8  F (36.6  C) (Oral)   Resp 20   Ht 1.702 m (5' 7\")   Wt 81.1 kg (178 lb 12.7 oz)   SpO2 92%   BMI 28.00 kg/m     Date 21 07 - 08/10/21 0659   Shift 9371-8231 7729-1764 2388-3914 24 Hour Total   INTAKE   I.V. 100   100   Shift Total(mL/kg) 100(1.23)   100(1.23)   OUTPUT   Shift Total(mL/kg)       Weight (kg) 81.1 81.1 81.1 81.1      Admit Weight: 86.2 kg (190 lb)     GENERAL APPEARANCE: no distress,  awake  EYES: no scleral icterus, pupils equal  HENT: NC/AT,  mouth  without ulcers or lesions  Lymphatics: no cervical or supraclavicular LAD  Endo: no moon facies, no goiter  Pulmonary: lungs clear to auscultation with equal breath sounds bilaterally, no clubbing  CV: regular rhythm, normal rate, no rub   - JVP -   - Edema-  GI: tense, distended  MS: no evidence of inflammation in joints  : no jiang  SKIN: spider angiomas upper chest   NEURO: face symmetric, mild tremor, grossly nonfocal,  LABS:   CMP  Recent Labs   Lab 21  0808 21  1305 21  2101 21  1707 21  0607 21  1157 21  1352 21  1153 21  0929 21     134 131*  --   --  133 132*   < >  --  133 139   POTASSIUM 4.4  4.3 4.2  --   --  4.4 4.5   < >  --  4.0 4.0   CHLORIDE 108  107 106  --   --  108 107   < >  --  109 111*   CO2 18* 17*  --   --  18* 20   < >  --  18* 14*   ANIONGAP 9 8  --   --  7 5   < >  --  6 14   * 175* 101* 139* 122* 164*   < >  --  127* 52* "   BUN 45* 44*  --   --  38* 39*   < >  --  41* 43*   CR 3.43* 3.33*  --   --  3.19* 3.13*   < >  --  2.27* 2.18*   GFRESTIMATED 17* 18*  --   --  19* 20*   < >  --  29* 30*   KEV 8.1* 7.8*  --   --  8.2* 8.2*   < >  --  8.4* 8.3*   MAG  --   --   --   --   --   --   --   --  1.6 1.7   PHOS  --   --   --   --   --   --   --   --  2.8  --    PROTTOTAL  --   --   --   --   --   --   --   --   --  7.0   ALBUMIN  --   --   --   --   --   --   --  2.8*  --  2.9*   BILITOTAL  --   --   --   --   --   --   --   --   --  1.0   ALKPHOS  --   --   --   --   --   --   --   --   --  176*   AST  --   --   --   --   --   --   --   --   --  43   ALT  --   --   --   --   --   --   --   --   --  17    < > = values in this interval not displayed.     CBC  Recent Labs   Lab 08/08/21 1305 08/07/21  1159 08/06/21  1157 08/05/21  0532 08/05/21  0008   HGB 8.7* 9.2* 8.6* 8.3* 7.7*   WBC 5.0  --  2.8* 2.9* 2.8*   RBC 2.70*  --  2.77* 2.69* 2.42*   HCT 26.3*  --  26.2* 25.2* 23.2*   MCV 97  --  95 94 96   MCH 32.2  --  31.0 30.9 31.8   MCHC 33.1  --  32.8 32.9 33.2   RDW 17.1*  --  16.2* 16.5* 16.8*   *  --  78* 85* 82*     INR  Recent Labs   Lab 08/08/21  1305 08/06/21  1157 08/03/21  2017   INR 1.34* 1.39* 1.36*     ABGNo lab results found in last 7 days.   URINE STUDIES  Recent Labs   Lab Test 08/03/21  2325 07/07/21  0420 06/13/21  1150 06/06/21  1023   COLOR Light Yellow Light Yellow Light Yellow Yellow   APPEARANCE Clear Clear Clear Clear   URINEGLC Negative Negative Negative Negative   URINEBILI Negative Negative Negative Negative   URINEKETONE 40 * Negative Negative Negative   SG 1.015 1.010 1.008 1.018   UBLD Trace* Negative Negative Negative   URINEPH 5.5 7.0 5.5 5.5   PROTEIN 100 * Negative Negative 20*   NITRITE Negative Negative Negative Negative   LEUKEST Negative Negative Negative Negative   RBCU 1 1 1 1   WBCU 1 4 3 2     No lab results found.  PTH  Recent Labs   Lab Test 06/13/20  0838   PTHI 34     IRON  STUDIES  Recent Labs   Lab Test 07/02/21  0816 06/30/21  0732 03/13/21  0946 06/13/20  0838 02/20/19  0703 02/18/19  1401   IRON 46 16* 28* 16* 18*  --    * 161* 189* 138*  --   --    IRONSAT 23 10* 15 12*  --   --    KATE 178 37 92 72  --  14*       IMAGING:  Personally reviewed the images and findings are as listed in HPI     Alpesh Cifuentes MD

## 2021-08-09 NOTE — PROGRESS NOTES
Care Suites Post Procedure Summary (without sedation)     Immediately prior to starting the procedure a Time Out was conducted with procedural staff and re-confirmed the patient s name, procedure, and site/side.      Consent obtained from patient after discussing the risks, benefits and alternatives.      Procedure: Paracentesis    Procedure Interventions:    Fluid clear yellow 3750 (cc) removed: Yes.    Tube/Drain placed: No.    Patient tolerance: Patient tolerated the procedure well with no immediate complications.     Post-procedure report given to: Kareem OVALLE RN and pt transferred to [facility] by Radiology transport.    (See Doc Flow-sheets and MAR for additional information)

## 2021-08-09 NOTE — PROGRESS NOTES
DATE & TIME: 08/09/2021 AM                 Cognitive Concerns/ Orientation : Alert/Oriented x 4, flat affect   BEHAVIOR & AGGRESSION TOOL COLOR: Green  CIWA SCORE: 5 -Nausea and anxiety            ABNL VS/O2: /73 otherwise VSS, room air  MOBILITY: Assist-1 gait belt/walker  PAIN MANAGMENT: Oxycodone x1 (Q6 hours) for abdominal and back pain  DIET: Regular, fair appetite   BOWEL/BLADDER: Urinal at bedside, bathroom for BM. No BM this shift   ABNL LAB/BG: creat 3.44; BUN 45.  DRAIN/DEVICES: R PIV saline locked  TELEMETRY RHYTHM: Normal sinus rhythm with BBB  SKIN: Scattered bruises and scabs, fragile skin; abdomen distended  TESTS/PROCEDURES: Paracentesis, 3750 cc removed. Right lower abdominal dressing intact.  D/C DATE: Pending  OTHER IMPORTANT INFO: Enteric isolation maintained for C.Diff; Nausea with movement

## 2021-08-09 NOTE — PROGRESS NOTES
CLINICAL NUTRITION SERVICES - REASSESSMENT NOTE    Recommendations Ordered by Registered Dietitian (RD):   - No Salt Packet  - Continue Ensure BID between meals    Malnutrition: (8/9)   % Weight Loss:  > 5% in 1 month (severe malnutrition)  % Intake:  </= 50% for >/= 5 days (severe malnutrition)(most likely)  Subcutaneous Fat Loss:  Orbital region moderate depletion and Upper arm region severe depletion  Muscle Loss:  Temporal region moderate depletion and Clavicle bone region moderate-severe depletion  Fluid Retention:  Mild 1+ as above      Malnutrition Diagnosis: Severe malnutrition  In Context of:  Acute illness or injury  Chronic illness or disease  Environmental or social circumstances     EVALUATION OF PROGRESS TOWARD GOALS   Diet: Regular Diet   Ensure Enlive BID between meals     Intake/Tolerance:   - Tolerating supplements.   - Having some nausea, dry heaving this morning during visit. Breakfast just arrived - lots of fruit, 1 HB egg. Eating % of small meals BID-TID.     ASSESSED NUTRITION NEEDS:  Dosing Weight 71.8 kg   Estimated Energy Needs: 3108-5899 kcals (25-30 Kcal/Kg)  Justification: maintenance  Estimated Protein Needs:  grams protein (1.2-1.5 g pro/Kg)  Justification: hypercatabolism with acute illness and CKD    NEW FINDINGS:   - Possible paracentesis this morning    Previous Goals:   Diet will advance within the next 48 hours and patient will consume at least 50% meals and supplements   Evaluation: Met    Previous Nutrition Diagnosis:   Inadequate protein-energy intake related to NPO for procedures today as evidenced by meeting 0% needs   Evaluation: Improving    CURRENT NUTRITION DIAGNOSIS  Inadequate oral intake related to poor appetite, nausea, nutrient displacement by etoh prior to admission as evidenced by weight loss of up to 15% in 3 weeks, fat and muscle losses on exam.     INTERVENTIONS  Recommendations / Nutrition Prescription  No Salt Packet - Low sodium diet  Ensure BID  between meals     Implementation  General encouragement provided. Patient is familiar with a low sodium diet.     Goals  Intake of >75% meals TID + 1-2 supplements.       MONITORING AND EVALUATION:  Progress towards goals will be monitored and evaluated per protocol and Practice Guidelines    Summer Isaac RD, LD  [employer] [facility], [address], [address], [place]   Pager: [phone]  Weekend Pager: [phone]

## 2021-08-09 NOTE — PROGRESS NOTES
DATE & TIME: 08/08/2021 Night    Cognitive Concerns/ Orientation : Alert/Oriented x 4, flat affect   BEHAVIOR & AGGRESSION TOOL COLOR: Green  CIWA SCORE: 3 (nausea/tremor), 2 (nausea/tremor)   ABNL VS/O2: /70 otherwise VSS, room air  MOBILITY: Assist-1 gait belt/walker  PAIN MANAGMENT: Oxycodone (Q6 hours) for abdominal pain  DIET: Low sodium  BOWEL/BLADDER: Urinal at bedside, bathroom for BM  ABNL LAB/BG: Na 131; creat 3.33; BUN 44; hgb 8.7; hematocrit 26.3; plat 108  DRAIN/DEVICES: R PIV saline locked  TELEMETRY RHYTHM: Normal sinus rhythm  SKIN: Scattered bruises and abrasions, fragile skin; abdomen distended  TESTS/PROCEDURES: Paracentesis possible 8/9  D/C DATE: Disposition pending based on continued ETOH use while committed  OTHER IMPORTANT INFO: Enteric isolation maintained for C.Diff; Nausea with movement; Nephrology consult

## 2021-08-09 NOTE — PLAN OF CARE
DATE & TIME: 08/09/2021 2845-2224            Cognitive Concerns/ Orientation : Alert/Oriented x 4, flat affect   BEHAVIOR & AGGRESSION TOOL COLOR: Green  CIWA SCORE: 5/5 for mild nausea and anxiety. Pt did have baseline anxiety disorder.            ABNL VS/O2: VSS, room air, lungs diminished.   MOBILITY: Assist-1 gait belt/walker  PAIN MANAGMENT: Oxycodone x1 (Q6 hours) for abdominal and back pain  DIET: Regular, fair appetite   BOWEL/BLADDER: Urinal at bedside, bathroom for BM. No BM this shift   ABNL LAB/BG: creat 3.44; BUN 45.  DRAIN/DEVICES: R PIV saline locked  TELEMETRY RHYTHM: Normal sinus rhythm with BBB  SKIN: Scattered bruises and scabs, fragile skin; abdomen distended  TESTS/PROCEDURES: Paracentesis, 3750 cc removed. Right lower abdominal dressing intact.  D/C DATE: Pending  OTHER IMPORTANT INFO: Enteric isolation maintained for C.Diff; Nausea with movement. GI and nephrology following.

## 2021-08-10 ENCOUNTER — APPOINTMENT (OUTPATIENT)
Dept: PHYSICAL THERAPY | Facility: CLINIC | Age: 66
DRG: 981 | End: 2021-08-10
Payer: MEDICARE

## 2021-08-10 LAB
% LINING CELLS, BODY FLUID: 25 %
ALBUMIN FLD-MCNC: 0.8 G/DL
ALBUMIN SERPL-MCNC: 3.4 G/DL (ref 3.4–5)
ANION GAP SERPL CALCULATED.3IONS-SCNC: 9 MMOL/L (ref 3–14)
APPEARANCE FLD: CLEAR
BUN SERPL-MCNC: 48 MG/DL (ref 7–30)
CALCIUM SERPL-MCNC: 8.1 MG/DL (ref 8.5–10.1)
CHLORIDE BLD-SCNC: 103 MMOL/L (ref 94–109)
CO2 SERPL-SCNC: 18 MMOL/L (ref 20–32)
COLOR FLD: YELLOW
CREAT SERPL-MCNC: 3.51 MG/DL (ref 0.66–1.25)
GFR SERPL CREATININE-BSD FRML MDRD: 17 ML/MIN/1.73M2
GLUCOSE BLD-MCNC: 109 MG/DL (ref 70–99)
GLUCOSE BLDC GLUCOMTR-MCNC: 108 MG/DL (ref 70–99)
LYMPHOCYTES NFR FLD MANUAL: 14 %
MONOS+MACROS NFR FLD MANUAL: 53 %
NEUTS BAND NFR FLD MANUAL: 8 %
POTASSIUM BLD-SCNC: 4.3 MMOL/L (ref 3.4–5.3)
SODIUM SERPL-SCNC: 130 MMOL/L (ref 133–144)
WBC # FLD AUTO: 220 /UL

## 2021-08-10 PROCEDURE — 250N000013 HC RX MED GY IP 250 OP 250 PS 637: Performed by: INTERNAL MEDICINE

## 2021-08-10 PROCEDURE — 97116 GAIT TRAINING THERAPY: CPT | Mod: GP

## 2021-08-10 PROCEDURE — 82040 ASSAY OF SERUM ALBUMIN: CPT | Performed by: PHYSICIAN ASSISTANT

## 2021-08-10 PROCEDURE — P9047 ALBUMIN (HUMAN), 25%, 50ML: HCPCS | Performed by: INTERNAL MEDICINE

## 2021-08-10 PROCEDURE — 99233 SBSQ HOSP IP/OBS HIGH 50: CPT | Performed by: INTERNAL MEDICINE

## 2021-08-10 PROCEDURE — 36415 COLL VENOUS BLD VENIPUNCTURE: CPT | Performed by: INTERNAL MEDICINE

## 2021-08-10 PROCEDURE — 97530 THERAPEUTIC ACTIVITIES: CPT | Mod: GP

## 2021-08-10 PROCEDURE — 80048 BASIC METABOLIC PNL TOTAL CA: CPT | Performed by: INTERNAL MEDICINE

## 2021-08-10 PROCEDURE — 250N000011 HC RX IP 250 OP 636: Performed by: INTERNAL MEDICINE

## 2021-08-10 PROCEDURE — 120N000001 HC R&B MED SURG/OB

## 2021-08-10 RX ORDER — LACTULOSE 10 G/15ML
20 SOLUTION ORAL 3 TIMES DAILY
Status: DISCONTINUED | OUTPATIENT
Start: 2021-08-10 | End: 2021-08-22 | Stop reason: HOSPADM

## 2021-08-10 RX ADMIN — GABAPENTIN 200 MG: 100 CAPSULE ORAL at 08:12

## 2021-08-10 RX ADMIN — QUETIAPINE FUMARATE 200 MG: 200 TABLET ORAL at 21:05

## 2021-08-10 RX ADMIN — OCTREOTIDE ACETATE 200 MCG: 100 INJECTION, SOLUTION INTRAVENOUS; SUBCUTANEOUS at 21:05

## 2021-08-10 RX ADMIN — VANCOMYCIN HYDROCHLORIDE 125 MG: 125 CAPSULE ORAL at 21:05

## 2021-08-10 RX ADMIN — OCTREOTIDE ACETATE 200 MCG: 100 INJECTION, SOLUTION INTRAVENOUS; SUBCUTANEOUS at 08:14

## 2021-08-10 RX ADMIN — OXYCODONE HYDROCHLORIDE 5 MG: 5 TABLET ORAL at 21:35

## 2021-08-10 RX ADMIN — LACTULOSE 20 G: 20 SOLUTION ORAL at 15:30

## 2021-08-10 RX ADMIN — DISULFIRAM 250 MG: 250 TABLET ORAL at 08:12

## 2021-08-10 RX ADMIN — VANCOMYCIN HYDROCHLORIDE 125 MG: 125 CAPSULE ORAL at 08:14

## 2021-08-10 RX ADMIN — OXYCODONE HYDROCHLORIDE 5 MG: 5 TABLET ORAL at 01:28

## 2021-08-10 RX ADMIN — OXYCODONE HYDROCHLORIDE 5 MG: 5 TABLET ORAL at 08:14

## 2021-08-10 RX ADMIN — QUETIAPINE FUMARATE 50 MG: 50 TABLET ORAL at 08:13

## 2021-08-10 RX ADMIN — LACTULOSE 20 G: 20 SOLUTION ORAL at 21:05

## 2021-08-10 RX ADMIN — TRAZODONE HYDROCHLORIDE 100 MG: 100 TABLET ORAL at 21:05

## 2021-08-10 RX ADMIN — VANCOMYCIN HYDROCHLORIDE 125 MG: 125 CAPSULE ORAL at 15:30

## 2021-08-10 RX ADMIN — VANCOMYCIN HYDROCHLORIDE 125 MG: 125 CAPSULE ORAL at 13:24

## 2021-08-10 RX ADMIN — ALBUMIN HUMAN 25 G: 0.25 SOLUTION INTRAVENOUS at 08:14

## 2021-08-10 RX ADMIN — MIRTAZAPINE 30 MG: 15 TABLET, FILM COATED ORAL at 21:05

## 2021-08-10 RX ADMIN — MIDODRINE HYDROCHLORIDE 10 MG: 5 TABLET ORAL at 17:02

## 2021-08-10 RX ADMIN — MIDODRINE HYDROCHLORIDE 10 MG: 5 TABLET ORAL at 13:24

## 2021-08-10 RX ADMIN — MULTIPLE VITAMINS W/ MINERALS TAB 1 TABLET: TAB at 08:12

## 2021-08-10 RX ADMIN — ONDANSETRON 4 MG: 2 INJECTION INTRAMUSCULAR; INTRAVENOUS at 21:04

## 2021-08-10 RX ADMIN — OXYCODONE HYDROCHLORIDE 5 MG: 5 TABLET ORAL at 15:30

## 2021-08-10 RX ADMIN — VORTIOXETINE 10 MG: 10 TABLET, FILM COATED ORAL at 08:13

## 2021-08-10 RX ADMIN — TAMSULOSIN HYDROCHLORIDE 0.8 MG: 0.4 CAPSULE ORAL at 08:13

## 2021-08-10 RX ADMIN — LACTULOSE 10 G: 20 SOLUTION ORAL at 08:11

## 2021-08-10 RX ADMIN — PANTOPRAZOLE SODIUM 40 MG: 40 TABLET, DELAYED RELEASE ORAL at 15:30

## 2021-08-10 RX ADMIN — VORTIOXETINE 10 MG: 10 TABLET, FILM COATED ORAL at 21:06

## 2021-08-10 RX ADMIN — FOLIC ACID 1 MG: 1 TABLET ORAL at 08:14

## 2021-08-10 RX ADMIN — MIDODRINE HYDROCHLORIDE 10 MG: 5 TABLET ORAL at 08:13

## 2021-08-10 RX ADMIN — FINASTERIDE 5 MG: 5 TABLET, FILM COATED ORAL at 08:12

## 2021-08-10 RX ADMIN — NICOTINE 1 PATCH: 14 PATCH, EXTENDED RELEASE TRANSDERMAL at 08:14

## 2021-08-10 RX ADMIN — OCTREOTIDE ACETATE 200 MCG: 100 INJECTION, SOLUTION INTRAVENOUS; SUBCUTANEOUS at 15:31

## 2021-08-10 RX ADMIN — GABAPENTIN 200 MG: 100 CAPSULE ORAL at 21:05

## 2021-08-10 RX ADMIN — PANTOPRAZOLE SODIUM 40 MG: 40 TABLET, DELAYED RELEASE ORAL at 06:51

## 2021-08-10 RX ADMIN — GABAPENTIN 200 MG: 100 CAPSULE ORAL at 13:25

## 2021-08-10 ASSESSMENT — ACTIVITIES OF DAILY LIVING (ADL)
ADLS_ACUITY_SCORE: 13

## 2021-08-10 NOTE — PROGRESS NOTES
Nephrology Progress Note  08/10/2021         ASSESSMENT AND RECOMMENDATIONS:   1 CKD 4 at baseline-widely fluctuating creatinine around 2.5-3 at baseline.  Likely related to multiple acute kidney injury episodes and hepatorenal physiology.  Nash urine at baseline.     2 acute kidney injury-likely secondary hepatorenal syndrome.  Needs frequent large-volume paracentesis.  Continues on octreotide drip, midodrine.  Received albumin boluses without improvement in renal function.  Urinalysis shows trace protein.  Dipstick protein is 2+ compared to usual negative but likely overestimated on dipstick in NGUYEN.  - kidney function continues to worsen, non oliguric.      3 end-stage liver disease with portal hypertension-esophageal varices, frequent paracentesis need.  - Paracentesis done yesterday.      4 FEN-bicarb is low at 18.  Possibly contributed by respiratory alkalosis with liver failure as well.  No indication for bicarbonate replacement.     5 C. difficile colitis-on p.o. vancomycin.  Diarrhea improved.     6 acute on chronic anemia-concern for GI bleed given history of esophageal varices.  GI on board.  Getting PRBC as needed.  No plans for endoscopy at this time.     7 thrombocytopenia-secondary to liver disease.     Discussion / Recc -   - Generally poor prognosis. Has not responded to HRS directed therapy.   - Abd pain +, SBP needs to be ruled out. Fluid cell count pending?  Continue Midodrine, Octreotide.   - No indication for dialysis yet.   Poor candidate for dialysis if kidney function continues to worsen , given HRS/ not a txp candidate, poor compliance with all therapies.     Alpesh Cifuentes MD  [facility] - Nephrology   [phone]      Interval History :   Seen / examined.   No acute issues overnight.   Had ~4 L paracentesis done. 25 gm Albumin Iv yesterday and today   C/o of diffuse abd discomfort wth lower abd pain. Afebrile.     Review of Systems:   A 4 point review of systems was negative  except as noted above.  Notably: poor appetite. no nausea or vomiting or diarrhea.  no confusion,  no fever or chills    Physical Exam:   I/O last 3 completed shifts:  In: 460 [P.O.:360; I.V.:100]  Out: 850 [Urine:850]       GENERAL APPEARANCE: no distress,  awake  EYES: no scleral icterus, pupils equal  HENT: NC/AT,  mouth  without ulcers or lesions  Lymphatics: no cervical or supraclavicular LAD  Endo: no moon facies, no goiter  Pulmonary: lungs clear to auscultation with equal breath sounds bilaterally, no clubbing  CV: regular rhythm, normal rate, no rub   - JVP -   - Edema-  GI: tense, distended  MS: no evidence of inflammation in joints  : no jiang  SKIN: spider angiomas upper chest   NEURO: face symmetric, mild tremor, grossly nonfocal,  LABS:     Labs:   All labs reviewed by me  Electrolytes/Renal - Recent Labs   Lab Test 08/10/21  0752 08/10/21  0731 08/09/21  0808 08/08/21  1305 08/04/21  1352 08/04/21  0929 08/03/21 2017 07/11/21  0628 07/10/21  0714 07/07/21  0827 07/03/21  0712   NA  --  130* 134  134 131*   < > 133 139   < > 135 134 136   POTASSIUM  --  4.3 4.4  4.3 4.2   < > 4.0 4.0   < > 4.3 4.0 4.1   CHLORIDE  --  103 108  107 106   < > 109 111*   < > 105 103 107   CO2  --  18* 17*  18* 17*   < > 18* 14*   < > 24 26 23   BUN  --  48* 45*  45* 44*   < > 41* 43*   < > 32* 32* 32*   CR  --  3.51* 3.44*  3.43* 3.33*   < > 2.27* 2.18*   < > 2.83* 2.84* 2.63*   * 109* 106*  108* 175*   < > 127* 52*   < > 81 108* 94   KEV  --  8.1* 8.0*  8.1* 7.8*   < > 8.4* 8.3*   < > 8.1* 8.4* 8.4*   MAG  --   --   --   --   --  1.6 1.7  --   --   --  1.9   PHOS  --   --   --   --   --  2.8  --   --  4.7* 2.8 3.4    < > = values in this interval not displayed.       CBC -   Recent Labs   Lab Test 08/08/21  1305 08/07/21  1159 08/06/21  1157 08/05/21  0532   WBC 5.0  --  2.8* 2.9*   HGB 8.7* 9.2* 8.6* 8.3*   *  --  78* 85*       LFTs -   Recent Labs   Lab Test 08/10/21  0731 08/09/21  0808  08/04/21  1153 08/03/21 2017 07/06/21  0732   ALKPHOS  --  118  --  176* 181*   BILITOTAL  --  0.6  --  1.0 0.5   ALT  --  18  --  17 20   AST  --  19  --  43 21   PROTTOTAL  --  6.4*  --  7.0 6.3*   ALBUMIN 3.4 3.2* 2.8* 2.9* 2.9*       Iron Panel -   Recent Labs   Lab Test 07/02/21  0816 06/30/21  0732 03/13/21  0946   IRON 46 16* 28*   IRONSAT 23 10* 15   KATE 178 37 92           Current Medications:    albumin human  12.5 g Intravenous Once     albumin human  25 g Intravenous Daily     disulfiram  250 mg Oral Daily     finasteride  5 mg Oral Daily     folic acid  1 mg Oral Daily     gabapentin  200 mg Oral TID     lactulose  20 g Oral TID     lidocaine (buffered or not buffered)  5 mL Intradermal Once     midodrine  10 mg Oral TID w/meals     mirtazapine  30 mg Oral At Bedtime     multivitamin w/minerals  1 tablet Oral Daily     nicotine  1 patch Transdermal Daily     nicotine   Transdermal Q8H     octreotide  200 mcg Intravenous TID     pantoprazole  40 mg Oral BID AC     QUEtiapine  200 mg Oral At Bedtime     sodium chloride (PF)  3 mL Intracatheter Q8H     tamsulosin  0.8 mg Oral Daily     traZODone  100 mg Oral At Bedtime     vancomycin  125 mg Oral 4x Daily     vortioxetine  10 mg Oral BID       - MEDICATION INSTRUCTIONS -       Alpesh Cifuentes MD

## 2021-08-10 NOTE — PROGRESS NOTES
[facility]    Medicine Progress Note - Hospitalist Service       Date of Admission:  8/3/2021    Assessment & Plan           Jim Richard is a 66-year-old gentleman with a complex past medical history of alcohol abuse/dependence complicated by alcoholic liver disease with ascites, status post multiple paracentesis in the past, hepatorenal syndrome, chronic kidney disease stage IV, history of recent bacteremia due to Enterococcus, hypertension, COPD, tobacco use disorder, depression/anxiety, thrombocytopenia and obstructive sleep apnea, who presented to ER for evaluation of generalized weakness, diarrhea and dry heaves.     Decompensated alcoholic liver cirrhosis with recurrent ascites  Portal Hypertension:  Possible hepatorenal syndrome with CKD stage IV  Patient has been having issues with recurrent ascites requiring multiple paracenteses.  It seems that last paracentesis was on 07/26/2021 when 8.7 liters of fluid was removed before that had paracentesis on 07/2015 with 5.2 liters fluid removed.  It looks like his prior ascites fluid analysis was suggestive of SBP.   Presents with recurrent abdominal distention  * Paracentesis (8/4):  Removed over 6 L and given Albumin (SAAG 1.9 consistent with portal Hypertension). Fluid wbc 201 not indicative of SBP.   -Repeat paracentesis on 8/9, with removal of 3.8 L of fluids.  WBC again will check, 220 with neutrophil- 8%, not indicative of SBP  -Continue PTA Lactulose, however due to no bowel movement for last few days will increase lactulose to 20 g 3 times daily  - GI following and appreciate their recommendations.  - diet changed to low salt. 2/2 elevated creatinine not on fluid restriction  -Baseline Cr function around 2.  Was 2.18 on admission but gradually worsening and is 2.51 today.    -Nephrology following, appreciate input  - PTA diuretics has been on hold  -Continue octreotide 200 mcg IV 3 times daily + Midodrin 10 mg TID + Trintellix  10 mg bid     C Diff colitis.  Improved   Patient reports diarrhea for about a week prior to presentation.  C diff PCR positive in the ED   Patient has not had any bowel movement more than 48 hours now  - continue PO Vancomycin / enteric precautions  - Continue to monitor stool output      Alcoholism/alcohol dependence  Alcohol intoxication  History of alcohol withdrawals  History of depression/anxiety  Patient had a recent hospitalization at [facility] from where he was discharged to TCU.  After he returned back home, he started drinking again.  He admits he drinks 1 pint of vodka daily.  He stated that he has a .  His ethanol level is 0.23.    - Continue multivitamins, folic acid and thiamine  - [facility] protocol in place  - Psychiatry evaluated the patient on 8/6, see note for details  -Continue medications: Mirtazapine, Trinellix, Seroquel, Trazodone   - Patient is supposedly already committed and social work is following to help with disposition      Acute on chronic anemia   Hemoglobin dropped to a fabiola of 6.9.  Status post 1 unit PRBC transfused 8/5  -Hemoglobin has remained stable around 8.5 since transfusion  - No further GI bleed  - Conditional order to transfuse for hgb <7   - GI following now and appreciate their recommendations.  No plans for endoscopy at this time       Atypical chest pain. Resolved   Patient complained of chest pain since arrival to the ED.  Troponins negative.  EKG without evidence of acute ischemia  - Continue to monitor      Generalized weakness  Failure to thrive  This is likely multifactorial from his chronic alcohol use, alcoholic liver cirrhosis, poor oral intake, and recent diarrhea.    - PT & /care coordinator consulted     Hypoglycemia  - resolved  Blood sugars were in the 40s upon arrival in the ER.  This is likely due to poor oral intake.  He was given a couple of doses of D50 with improvement of his blood sugars.    - Hypoglycemia  protocol in place  - BS > 100.Monitor for now      Tobacco use disorder  - Nicotine patch had been ordered     Recent Enterococcus bacteremia  He had been treated with ampicillin IV for 2 weeks as per ID recommendations.  He denies any fevers or leukocytosis at this time.     H/o BPH  - PTA Flomax and Proscar     History of COPD  This does not seem to be an acute issue at this time.    - PTA inhalers     Obstructive sleep apnea   Noncompliant with CPAP.     Thrombocytopenia, chronic, related to his chronic liver disease  There is no evidence of active bleeding.  Platelet count on admission was 148.    - stable -108 most recent lab, Will monitor it periodically         Diet: Snacks/Supplements Adult: Ensure Enlive; Between Meals  Advance Diet as Tolerated: Regular Diet Adult    DVT Prophylaxis: Pneumatic Compression Devices  Leger Catheter: Not present  Central Lines: None  Code Status: No CPR- Do NOT Intubate      Disposition Plan   Expected discharge: 08/12/2021 likely will need transitional care unit, pending PT evaluation once safe disposition plan/ TCU bed available and Cleared by all consultants.     The patient's care was discussed with the Bedside Nurse and Patient.  Also discussed with nephrology about plans of care.  He is a poor dialysis candidate, continue current treatment including albumin, midodrine      Total unit/floor time 40 minutes . Greater than 50% was spent in counseling with the patient on discussion of the results of his paracentesis fluid, goals of care and further plan of care, also reiterated the need to stop drinking alcohol.  Discussed with nephrology as above.  Kassandra Lew MD  Hospitalist Service  [facility]  Securely message with the Vocera Web Console (learn more here)  Text page via [employer] Paging/Directory      Clinically Significant Risk Factors Present on Admission                 ______________________________________________________________________    Interval History   Patient continues to have abdominal discomfort.  Has not had any bowel movement for the last 3 days.  No other nursing concerns.    Data reviewed today: I reviewed all medications, new labs and imaging results over the last 24 hours.    Physical Exam   Vital Signs: Temp: 98.8  F (37.1  C) Temp src: Oral BP: 120/65 Pulse: 76   Resp: 16 SpO2: 94 % O2 Device: None (Room air)    Weight: 178 lbs 12.69 oz  Exam:  Constitutional: Awake, alert and no distress.   Head: Normocephalic. No masses, lesions, tenderness or abnormalities  ENT: ENT exam normal, no neck nodes or sinus tenderness  Cardiovascular: RRR.  No murmurs, no rubs or JVD  Respiratory: Normal WOB,b/l equal air entry, no wheezes or crackles   Gastrointestinal: Abdomen distended but softer compared to yesterday, tender to palpate deeply. BS normal. No masses, organomegaly  : Deferred  Extremities : 1+ bilateral edema , no clubbing or cyanosis      Data   Recent Labs   Lab 08/10/21  0752 08/10/21  0731 08/09/21  0808 08/08/21  1305 08/07/21  1159 08/07/21  0607 08/06/21  1157 08/05/21  0532 08/04/21  1635 08/04/21  1427 08/03/21  2050 08/03/21 2017   WBC  --   --   --  5.0  --   --  2.8* 2.9*   < >  --    < > 5.5   HGB  --   --   --  8.7* 9.2*  --  8.6* 8.3*   < >  --    < > 8.5*   MCV  --   --   --  97  --   --  95 94   < >  --    < > 98   PLT  --   --   --  108*  --   --  78* 85*   < >  --    < > 148*   INR  --   --   --  1.34*  --   --  1.39*  --   --   --   --  1.36*   NA  --  130* 134  134 131*  --   --  132* 133  --   --    < > 139   POTASSIUM  --  4.3 4.4  4.3 4.2  --   --  4.5 4.4  --   --    < > 4.0   CHLORIDE  --  103 108  107 106  --   --  107 107  --   --    < > 111*   CO2  --  18* 17*  18* 17*  --   --  20 22  --   --    < > 14*   BUN  --  48* 45*  45* 44*  --   --  39* 40*  --   --    < > 43*   CR  --  3.51* 3.44*  3.43* 3.33*  --   --  3.13*  2.57*  --   --    < > 2.18*   ANIONGAP  --  9 9  9 8  --   --  5 4  --   --    < > 14   KEV  --  8.1* 8.0*  8.1* 7.8*  --   --  8.2* 8.4*  --   --    < > 8.3*   * 109* 106*  108* 175*  --    < > 164* 114*  --   --    < > 52*   ALBUMIN  --  3.4 3.2*  --   --   --   --   --   --   --    < > 2.9*   PROTTOTAL  --   --  6.4*  --   --   --   --   --   --   --   --  7.0   BILITOTAL  --   --  0.6  --   --   --   --   --   --   --   --  1.0   ALKPHOS  --   --  118  --   --   --   --   --   --   --   --  176*   ALT  --   --  18  --   --   --   --   --   --   --   --  17   AST  --   --  19  --   --   --   --   --   --   --   --  43   LIPASE  --   --   --   --   --   --   --  228  --   --   --  604*   TROPONIN  --   --   --   --   --   --   --   --   --  <0.015  --  0.016    < > = values in this interval not displayed.     Recent Results (from the past 24 hour(s))   US Paracentesis    Narrative    US PARACENTESIS 8/9/2021 1:34 PM    CLINICAL HISTORY: ascites, increased abd distension    PROCEDURE: Informed consent obtained. Time out performed. The abdomen  was prepped and draped in a sterile fashion. 10 mL of 1% lidocaine was  infused into local soft tissues. A 5 English catheter system was  introduced into the abdominal ascites under ultrasound guidance.    3.8 liters of clear fluid were removed and sent to lab if requested.    Patient tolerated procedure well.    Ultrasound imaging was obtained and placed in the patient's permanent  medical record.      Impression    IMPRESSION:  1.  Status post ultrasound-guided paracentesis.    ANIL KERN MD         SYSTEM ID:  [id number]     Medications     - MEDICATION INSTRUCTIONS -         albumin human  12.5 g Intravenous Once     albumin human  25 g Intravenous Daily     disulfiram  250 mg Oral Daily     finasteride  5 mg Oral Daily     folic acid  1 mg Oral Daily     gabapentin  200 mg Oral TID     lactulose  20 g Oral TID     lidocaine (buffered or not buffered)  5 mL  Intradermal Once     midodrine  10 mg Oral TID w/meals     mirtazapine  30 mg Oral At Bedtime     multivitamin w/minerals  1 tablet Oral Daily     nicotine  1 patch Transdermal Daily     nicotine   Transdermal Q8H     octreotide  200 mcg Intravenous TID     pantoprazole  40 mg Oral BID AC     QUEtiapine  200 mg Oral At Bedtime     sodium chloride (PF)  3 mL Intracatheter Q8H     tamsulosin  0.8 mg Oral Daily     traZODone  100 mg Oral At Bedtime     vancomycin  125 mg Oral 4x Daily     vortioxetine  10 mg Oral BID

## 2021-08-10 NOTE — PLAN OF CARE
DATE & TIME: 08/10/2021 7891-4704            Cognitive Concerns/ Orientation : Alert/Oriented x 4, flat affect   BEHAVIOR & AGGRESSION TOOL COLOR: Green  CIWA SCORE: na  ABNL VS/O2: VSS, room air, fine crackles on LLL.   MOBILITY: Assist-1 gait belt/walker. Up in chair for meals.   PAIN MANAGMENT: Oxycodone x1 (Q6 hours) for abdominal and back pain  DIET: Regular, fair appetite   BOWEL/BLADDER: Urinal at bedside, bathroom for BM. No BM for over 48 hours. Pt had had poor appetite as well. MD notified, increased lactulose to TID.  ABNL LAB/BG: Cr. 3.51, Na 130  DRAIN/DEVICES: R PIV saline locked  TELEMETRY RHYTHM: Normal sinus rhythm with BBB. Tele discontinued today.   SKIN: Scattered bruises and scabs, fragile skin; abdomen distended, no relief from paracentesis yesterday.   TESTS/PROCEDURES: Paracentesis done on 8/9/2021 with 3750 cc removed. Right lower abdominal dressing intact.  D/C DATE: Pending  OTHER IMPORTANT INFO: Enteric isolation maintained for C.Diff; Nausea with movement. GI and nephrology following. GI recommending palliative care.        1600: unable to void, bladder scanned > 937 ml. MD notified regarding straight cath order.

## 2021-08-10 NOTE — PROGRESS NOTES
Care Management Follow Up    Length of Stay (days): 6    Expected Discharge Date: 08/12/2021     Concerns to be Addressed: substance/tobacco abuse/use, discharge planning     Patient plan of care discussed at interdisciplinary rounds: Yes    Anticipated Discharge Disposition:       Anticipated Discharge Services:    Anticipated Discharge DME:      Patient/family educated on Medicare website which has current facility and service quality ratings: no  Education Provided on the Discharge Plan:    Patient/Family in Agreement with the Plan:      Referrals Placed by CM/SW: External Care Coordination  Private pay costs discussed:     Additional Information:  Working with patient's commitment BRADFORD Cutler [phone] regarding discharge planning.  Silvia Farooq will assess on Thursday however based on therapy notes, writer anticipates patient will require TCU first before admitting to [facility].  Writer and CM will be speaking with patient either tomorrow or Thursday.       TANA CejaSW

## 2021-08-10 NOTE — PROGRESS NOTES
Gastroenterology Progress Note     Jim Richard MRN# [id number]   YOB: 1955 Age: 66 year old          Assessment and Plan:   Jim Richard is a pleasant 66 year old male with alcoholic liver cirrhosis complicated with esophageal varices, abdominal ascites, hepatorenal syndrome, pancytopenia and c/o abdominal pain. GI consulted on 6/7/21.     Active Problems:  Alcoholic liver cirrhosis  Alcholic intoxication  Elevated Creatinine- hepatorenal syndrome  Abdominal pain  Abdominal ascites- concern for SBP  Hypoalbuminemia  Pancytopenia  Patient has suffered from chronic alcoholism and developed significant complications with decompensated liver cirrhosis. He has required paracentesis every 2 weeks. Has had elevating creatinine- currently 3.43 well above admission creatinine at 2.18. He has type 2 hepatorenal syndrome. Has pancytopenia due to bone marrow suppression.  Albumin 2.8 (last checked on 8/4)  Platelets 108 Hemoglobin 6.9->7.7->8.5->8.3->8.7  INR 1.34 Tbili 1.0, Creatinine 3.43, MELD score 23 ( 20% mortality)  He is not a candidate for liver transplants given poor compliance and continues consumption of alcohol.   8/9 paracentesis  removed 3.8 L of fluid- no cell count available  Still has diffuse abdominal pain post paracentesis and there is a concern for SBP. Cell count pending.      -- Hemoglobin stable no plans for endoscopic procedure at this time  -- Continue midodrine and pantoprazole  -- Monitor CBC and CMP  -- Regular diet  -- Continue albumin until 3.5. ALbumin does not seem to be improving creatinine and kidney function.  -- Appreciate Nephrology consult (recommend paracentesis as needed with 12.5 g albumin replacement for every L removed, remove no more than 5L per paracentesis). Poor candidate for dialysis  -- There are no other treatments from GI standpoint to improve outcome or symptoms. Would recommended to consider palliative care  consult     C Difficile diarrhea  Continue vancomycin 125 mg QID  Diarrhea improved               Interval History:   denies chest pain, denies shortness of breath, alert, oriented to person, place and time and c/o diffuse abdominal pain.              Review of Systems:   C: NEGATIVE for fever, chills, change in weight  E/M: NEGATIVE for ear, mouth and throat problems  R: NEGATIVE for significant cough or SOB  CV: NEGATIVE for chest pain, palpitations or peripheral edema             Medications:   I have reviewed this patient's current medications    albumin human  12.5 g Intravenous Once     albumin human  25 g Intravenous Daily     disulfiram  250 mg Oral Daily     finasteride  5 mg Oral Daily     folic acid  1 mg Oral Daily     gabapentin  200 mg Oral TID     lactulose  10 g Oral BID     lidocaine (buffered or not buffered)  5 mL Intradermal Once     midodrine  10 mg Oral TID w/meals     mirtazapine  30 mg Oral At Bedtime     multivitamin w/minerals  1 tablet Oral Daily     nicotine  1 patch Transdermal Daily     nicotine   Transdermal Q8H     octreotide  200 mcg Intravenous TID     pantoprazole  40 mg Oral BID AC     QUEtiapine  200 mg Oral At Bedtime     sodium chloride (PF)  3 mL Intracatheter Q8H     sodium chloride (PF)  3 mL Intracatheter Q8H     tamsulosin  0.8 mg Oral Daily     traZODone  100 mg Oral At Bedtime     vancomycin  125 mg Oral 4x Daily     vortioxetine  10 mg Oral BID                  Physical Exam:   Vitals were reviewed  Vital Signs with Ranges  Temp:  [97.9  F (36.6  C)-99  F (37.2  C)] 98.8  F (37.1  C)  Pulse:  [72-81] 76  Resp:  [16-20] 16  BP: (113-148)/(54-88) 120/65  SpO2:  [94 %-98 %] 94 %  I/O last 3 completed shifts:  In: 460 [P.O.:360; I.V.:100]  Out: 850 [Urine:850]  Constitutional: healthy, alert, moderate distress and cooperative   Cardiovascular: negative, PMI normal. No lifts, heaves, or thrills. RRR. No murmurs, clicks gallops or rub  Respiratory: negative, Percussion  normal. Good diaphragmatic excursion. Lungs clear  Abdomen: Abdomen soft, tender. BS normal. No masses, organomegaly, positive findings: umbilical hernia, distended, tenderness moderate generalized           Data:   I reviewed the patient's new clinical lab test results.   Recent Labs   Lab Test 08/08/21  1305 08/07/21  1159 08/06/21  1157 08/05/21  0532 08/03/21 2017   WBC 5.0  --  2.8* 2.9* 5.5   HGB 8.7* 9.2* 8.6* 8.3* 8.5*   MCV 97  --  95 94 98   *  --  78* 85* 148*   INR 1.34*  --  1.39*  --  1.36*     Recent Labs   Lab Test 08/10/21  0731 08/09/21  0808 08/08/21  1305   POTASSIUM 4.3 4.4  4.3 4.2   CHLORIDE 103 108  107 106   CO2 18* 17*  18* 17*   BUN 48* 45*  45* 44*   ANIONGAP 9 9  9 8     Recent Labs   Lab Test 08/09/21  0808 08/05/21  0532 08/04/21  1153 08/03/21  2325 08/03/21 2017 07/07/21  0420 07/06/21  0732 06/16/21  1123 06/13/21  1150 04/04/21  1017 03/26/21  2131   ALBUMIN 3.2*  --  2.8*  --  2.9*  --  2.9*  --   --    < > 2.7*   BILITOTAL 0.6  --   --   --  1.0  --  0.5   < >  --    < > 2.2*   ALT 18  --   --   --  17  --  20   < >  --    < > 26   AST 19  --   --   --  43  --  21   < >  --    < > 36   PROTEIN  --   --   --  100 *  --  Negative  --   --  Negative   < >  --    LIPASE  --  228  --   --  604*  --   --   --   --   --  444*    < > = values in this interval not displayed.       I reviewed the patient's new imaging results.    All laboratory data reviewed  All imaging studies reviewed by me.    Gloria De Leon PA-C,  8/10/2021  [facility]  Office : [phone]  Cell: [phone] (Dr. Shaw)  Cell: [phone] (Gloria De Leon PA-C)

## 2021-08-10 NOTE — PLAN OF CARE
DATE & TIME: 08/09/21-8/10/21 3229-9424     Cognitive Concerns/ Orientation : Alert/Oriented x 4, flat affect, pleasant   BEHAVIOR & AGGRESSION TOOL COLOR: Green  CIWA SCORE: 4, 3, 3 (anxiety)        ABNL VS/O2: VSS on RA, Tmax: 99F tylenol given  MOBILITY: Ax1 GB/W  PAIN MANAGMENT: PRN Tylenol and Oxycodone given x1 for abd pain  DIET: Regular  BOWEL/BLADDER: Urinal at bedside, bathroom for BM. No BM this shift. Continent.  ABNL LAB/BG: NA  DRAIN/DEVICES: R PIV saline locked  TELEMETRY RHYTHM: NSR with BBB  SKIN: Scattered bruises and scabs, fragile skin; abdomen distended with paracentesis site in RLQ, CDI  TESTS/PROCEDURES: Paracentesis yesterday 3750 cc removed.   D/C DATE: Pending  OTHER IMPORTANT INFO: Enteric isolation maintained for C.Diff; Nausea with movement, refused zofran

## 2021-08-11 ENCOUNTER — APPOINTMENT (OUTPATIENT)
Dept: PHYSICAL THERAPY | Facility: CLINIC | Age: 66
DRG: 981 | End: 2021-08-11
Payer: MEDICARE

## 2021-08-11 LAB
ANION GAP SERPL CALCULATED.3IONS-SCNC: 11 MMOL/L (ref 3–14)
BASOPHILS # BLD MANUAL: 0 10E3/UL (ref 0–0.2)
BASOPHILS NFR BLD MANUAL: 0 %
BUN SERPL-MCNC: 47 MG/DL (ref 7–30)
CALCIUM SERPL-MCNC: 8.2 MG/DL (ref 8.5–10.1)
CHLORIDE BLD-SCNC: 105 MMOL/L (ref 94–109)
CO2 SERPL-SCNC: 16 MMOL/L (ref 20–32)
CREAT SERPL-MCNC: 3.83 MG/DL (ref 0.66–1.25)
EOSINOPHIL # BLD MANUAL: 0 10E3/UL (ref 0–0.7)
EOSINOPHIL NFR BLD MANUAL: 0 %
ERYTHROCYTE [DISTWIDTH] IN BLOOD BY AUTOMATED COUNT: 16.2 % (ref 10–15)
GFR SERPL CREATININE-BSD FRML MDRD: 15 ML/MIN/1.73M2
GLUCOSE BLD-MCNC: 109 MG/DL (ref 70–99)
HCT VFR BLD AUTO: 23.8 % (ref 40–53)
HGB BLD-MCNC: 8 G/DL (ref 13.3–17.7)
INR PPP: 1.41 (ref 0.85–1.15)
LYMPHOCYTES # BLD MANUAL: 0.4 10E3/UL (ref 0.8–5.3)
LYMPHOCYTES NFR BLD MANUAL: 11 %
MCH RBC QN AUTO: 32.1 PG (ref 26.5–33)
MCHC RBC AUTO-ENTMCNC: 33.6 G/DL (ref 31.5–36.5)
MCV RBC AUTO: 96 FL (ref 78–100)
MONOCYTES # BLD MANUAL: 0.9 10E3/UL (ref 0–1.3)
MONOCYTES NFR BLD MANUAL: 23 %
NEUTROPHILS # BLD MANUAL: 2.5 10E3/UL (ref 1.6–8.3)
NEUTROPHILS NFR BLD MANUAL: 66 %
PLAT MORPH BLD: ABNORMAL
PLATELET # BLD AUTO: 114 10E3/UL (ref 150–450)
POTASSIUM BLD-SCNC: 4.2 MMOL/L (ref 3.4–5.3)
RBC # BLD AUTO: 2.49 10E6/UL (ref 4.4–5.9)
RBC MORPH BLD: ABNORMAL
SARS-COV-2 RNA RESP QL NAA+PROBE: NEGATIVE
SODIUM SERPL-SCNC: 132 MMOL/L (ref 133–144)
WBC # BLD AUTO: 3.8 10E3/UL (ref 4–11)

## 2021-08-11 PROCEDURE — U0003 INFECTIOUS AGENT DETECTION BY NUCLEIC ACID (DNA OR RNA); SEVERE ACUTE RESPIRATORY SYNDROME CORONAVIRUS 2 (SARS-COV-2) (CORONAVIRUS DISEASE [COVID-19]), AMPLIFIED PROBE TECHNIQUE, MAKING USE OF HIGH THROUGHPUT TECHNOLOGIES AS DESCRIBED BY CMS-2020-01-R: HCPCS | Performed by: INTERNAL MEDICINE

## 2021-08-11 PROCEDURE — 250N000013 HC RX MED GY IP 250 OP 250 PS 637: Performed by: INTERNAL MEDICINE

## 2021-08-11 PROCEDURE — 36415 COLL VENOUS BLD VENIPUNCTURE: CPT | Performed by: PHYSICIAN ASSISTANT

## 2021-08-11 PROCEDURE — 250N000011 HC RX IP 250 OP 636: Performed by: INTERNAL MEDICINE

## 2021-08-11 PROCEDURE — 99233 SBSQ HOSP IP/OBS HIGH 50: CPT | Performed by: INTERNAL MEDICINE

## 2021-08-11 PROCEDURE — 120N000001 HC R&B MED SURG/OB

## 2021-08-11 PROCEDURE — 85027 COMPLETE CBC AUTOMATED: CPT | Performed by: INTERNAL MEDICINE

## 2021-08-11 PROCEDURE — 97530 THERAPEUTIC ACTIVITIES: CPT | Mod: GP | Performed by: PHYSICAL THERAPIST

## 2021-08-11 PROCEDURE — 85610 PROTHROMBIN TIME: CPT | Performed by: PHYSICIAN ASSISTANT

## 2021-08-11 PROCEDURE — 80048 BASIC METABOLIC PNL TOTAL CA: CPT | Performed by: INTERNAL MEDICINE

## 2021-08-11 PROCEDURE — 99232 SBSQ HOSP IP/OBS MODERATE 35: CPT | Performed by: INTERNAL MEDICINE

## 2021-08-11 PROCEDURE — 97116 GAIT TRAINING THERAPY: CPT | Mod: GP | Performed by: PHYSICAL THERAPIST

## 2021-08-11 PROCEDURE — P9047 ALBUMIN (HUMAN), 25%, 50ML: HCPCS | Performed by: INTERNAL MEDICINE

## 2021-08-11 RX ORDER — PROCHLORPERAZINE 25 MG
12.5 SUPPOSITORY, RECTAL RECTAL EVERY 12 HOURS PRN
Status: DISCONTINUED | OUTPATIENT
Start: 2021-08-11 | End: 2021-08-11

## 2021-08-11 RX ORDER — PROCHLORPERAZINE MALEATE 5 MG
5 TABLET ORAL EVERY 6 HOURS PRN
Status: DISCONTINUED | OUTPATIENT
Start: 2021-08-11 | End: 2021-08-11

## 2021-08-11 RX ORDER — OXYCODONE HYDROCHLORIDE 5 MG/1
5-10 TABLET ORAL EVERY 6 HOURS PRN
Status: DISCONTINUED | OUTPATIENT
Start: 2021-08-11 | End: 2021-08-17

## 2021-08-11 RX ADMIN — OXYCODONE HYDROCHLORIDE 5 MG: 5 TABLET ORAL at 11:41

## 2021-08-11 RX ADMIN — OCTREOTIDE ACETATE 200 MCG: 100 INJECTION, SOLUTION INTRAVENOUS; SUBCUTANEOUS at 08:40

## 2021-08-11 RX ADMIN — VORTIOXETINE 10 MG: 10 TABLET, FILM COATED ORAL at 21:21

## 2021-08-11 RX ADMIN — DISULFIRAM 250 MG: 250 TABLET ORAL at 08:40

## 2021-08-11 RX ADMIN — QUETIAPINE FUMARATE 50 MG: 50 TABLET ORAL at 15:36

## 2021-08-11 RX ADMIN — LACTULOSE 20 G: 20 SOLUTION ORAL at 21:21

## 2021-08-11 RX ADMIN — OXYCODONE HYDROCHLORIDE 5 MG: 5 TABLET ORAL at 05:53

## 2021-08-11 RX ADMIN — QUETIAPINE FUMARATE 200 MG: 200 TABLET ORAL at 21:21

## 2021-08-11 RX ADMIN — NICOTINE 1 PATCH: 14 PATCH, EXTENDED RELEASE TRANSDERMAL at 08:40

## 2021-08-11 RX ADMIN — GABAPENTIN 200 MG: 100 CAPSULE ORAL at 08:40

## 2021-08-11 RX ADMIN — FINASTERIDE 5 MG: 5 TABLET, FILM COATED ORAL at 08:40

## 2021-08-11 RX ADMIN — MULTIPLE VITAMINS W/ MINERALS TAB 1 TABLET: TAB at 08:40

## 2021-08-11 RX ADMIN — GABAPENTIN 200 MG: 100 CAPSULE ORAL at 21:21

## 2021-08-11 RX ADMIN — VANCOMYCIN HYDROCHLORIDE 125 MG: 125 CAPSULE ORAL at 08:40

## 2021-08-11 RX ADMIN — TAMSULOSIN HYDROCHLORIDE 0.8 MG: 0.4 CAPSULE ORAL at 08:40

## 2021-08-11 RX ADMIN — ACETAMINOPHEN 650 MG: 325 TABLET, FILM COATED ORAL at 01:32

## 2021-08-11 RX ADMIN — ONDANSETRON 4 MG: 2 INJECTION INTRAMUSCULAR; INTRAVENOUS at 15:00

## 2021-08-11 RX ADMIN — VANCOMYCIN HYDROCHLORIDE 125 MG: 125 CAPSULE ORAL at 21:22

## 2021-08-11 RX ADMIN — FOLIC ACID 1 MG: 1 TABLET ORAL at 08:41

## 2021-08-11 RX ADMIN — PANTOPRAZOLE SODIUM 40 MG: 40 TABLET, DELAYED RELEASE ORAL at 06:08

## 2021-08-11 RX ADMIN — OCTREOTIDE ACETATE 200 MCG: 100 INJECTION, SOLUTION INTRAVENOUS; SUBCUTANEOUS at 21:18

## 2021-08-11 RX ADMIN — TRAZODONE HYDROCHLORIDE 100 MG: 100 TABLET ORAL at 21:21

## 2021-08-11 RX ADMIN — MIRTAZAPINE 30 MG: 15 TABLET, FILM COATED ORAL at 21:21

## 2021-08-11 RX ADMIN — VORTIOXETINE 10 MG: 10 TABLET, FILM COATED ORAL at 11:41

## 2021-08-11 RX ADMIN — ONDANSETRON 4 MG: 4 TABLET, ORALLY DISINTEGRATING ORAL at 10:00

## 2021-08-11 RX ADMIN — OCTREOTIDE ACETATE 200 MCG: 100 INJECTION, SOLUTION INTRAVENOUS; SUBCUTANEOUS at 16:49

## 2021-08-11 RX ADMIN — MIDODRINE HYDROCHLORIDE 10 MG: 5 TABLET ORAL at 11:41

## 2021-08-11 RX ADMIN — OXYCODONE HYDROCHLORIDE 10 MG: 5 TABLET ORAL at 16:48

## 2021-08-11 RX ADMIN — ONDANSETRON 4 MG: 2 INJECTION INTRAMUSCULAR; INTRAVENOUS at 21:14

## 2021-08-11 RX ADMIN — ALBUMIN HUMAN 25 G: 0.25 SOLUTION INTRAVENOUS at 08:39

## 2021-08-11 RX ADMIN — LACTULOSE 20 G: 20 SOLUTION ORAL at 08:40

## 2021-08-11 RX ADMIN — MIDODRINE HYDROCHLORIDE 10 MG: 5 TABLET ORAL at 08:40

## 2021-08-11 RX ADMIN — VANCOMYCIN HYDROCHLORIDE 125 MG: 125 CAPSULE ORAL at 11:41

## 2021-08-11 ASSESSMENT — ACTIVITIES OF DAILY LIVING (ADL)
ADLS_ACUITY_SCORE: 13
ADLS_ACUITY_SCORE: 12
ADLS_ACUITY_SCORE: 12
ADLS_ACUITY_SCORE: 13

## 2021-08-11 ASSESSMENT — MIFFLIN-ST. JEOR: SCORE: 1531.63

## 2021-08-11 NOTE — PLAN OF CARE
DATE & TIME: 8/11/2021 0700-1930Cognitive Concerns/ Orientation : A&O x 4   BEHAVIOR & AGGRESSION TOOL COLOR: Green  CIWA SCORE: NA    ABNL VS/O2: VSS on room air  MOBILITY: Assist x1, GB and walker  PAIN MANAGMENT: Pain managed with Prn Tylenol and Oxycodone, not effective.   DIET: Regular, poor appetite.   BOWEL/BLADDER: No BM. Leger intact.   ABNL LAB/BG: Cr. 3.83.   DRAIN/DEVICES: PIV SL, Leger  TELEMETRY RHYTHM: NA  SKIN: Scattered bruises and scab. Fragile skin. Band aid to right abdomen, paracentesis site. CDI. Coccyx red blanchable, meplix in place, CDI.   TESTS/PROCEDURES: Paracentesis done on 8/9/21  D/C DATE: pending.   OTHER IMPORTANT INFO: referral made for hospice per pt request. Updated daughter over the phone (verbal consent given by pt).  following.       1500: 200 ml of yellow/light brown emesis noted (consumed chocolate ensure about two hours ago per pt). Pt felt better after throwing up. PRN IV zofran given with relief. Contacted MD for more pain meds (increased Oxycodone dosage) and additional antiemetic in case nausea returns again.     Hospice will see pt in person tomorrow.

## 2021-08-11 NOTE — PROGRESS NOTES
"Care Management Follow Up    Length of Stay (days): 7    Expected Discharge Date: 08/12/2021     Concerns to be Addressed: substance/tobacco abuse/use, discharge planning   Please see additional information section  Patient plan of care discussed at interdisciplinary rounds: Yes    Anticipated Discharge Disposition:       Anticipated Discharge Services:    Anticipated Discharge DME:      Patient/family educated on Medicare website which has current facility and service quality ratings: no  Education Provided on the Discharge Plan:    Patient/Family in Agreement with the Plan:      Referrals Placed by CM/SW: External Care Coordination  Private pay costs discussed:     Additional Information:  Writer met with patient this morning to let him know his CM Omayra Angela and self will meet him at 1300 to discuss his discharge plan.    Reviewed that therapists are recommending TCU.  Jim said \"I'm too sick for that\". Writer suggested when he is medically stable this would be the next level of care. Jim responded I just want to go home, have a nurse check in on me and die at home\".  Writer asked if he means he is ready for hospice and Jim responded yes.    He explains he has been told he needs dialysis and he doesn't want to go on dialysis.  Writer expressed sorrow and he said \"I did this to myself\".    Suggested to Jim that the hospitalist will meet with him to further discuss goals of care.  Palliative Care did meet with patient during a recent stay and at that time Jim was not interested in hospice.   Updated Omayra Angela, she would like to delay her visit until tomorrow to allow time for goals of care discussion.   Care Coordinator updated and will page Dr Calhoun.  Plan:  Will follow      NIKKI Ceja      "

## 2021-08-11 NOTE — PLAN OF CARE
DATE & TIME: 8/10/21, 2300 - 7004    Cognitive Concerns/ Orientation : A&O x 4   BEHAVIOR & AGGRESSION TOOL COLOR: Green  CIWA SCORE: NA   ABNL VS/O2: VSS on room air  MOBILITY: Assist x1, GB and walker  PAIN MANAGMENT: Pain managed with Prn Tylenol and Oxycodone  DIET: Regular  BOWEL/BLADDER: No BM. Unable to void. Bladder scanned for 439 ml. Urethral catheter was placed. Drained 350 ml  ABNL LAB/BG: NA  DRAIN/DEVICES: PIV SL, Leger  TELEMETRY RHYTHM: NA  SKIN: Scattered bruises and scab. Fragile skin. Band aid to right abdomen, paracentesis site. CDI  TESTS/PROCEDURES: Paracentesis done on 8/9/21  D/C DATE: Pending  OTHER IMPORTANT INFO: Abdomen grossly distended and taut.Enteric precautions maintained

## 2021-08-11 NOTE — PROGRESS NOTES
Nephrology Progress Note  08/11/2021         ASSESSMENT AND RECOMMENDATIONS:   1 CKD 4 at baseline-widely fluctuating creatinine around 2.5-3 at baseline.  Likely related to multiple acute kidney injury episodes and hepatorenal physiology.  Gage urine at baseline.     2 acute kidney injury-likely secondary hepatorenal syndrome.  Needs frequent large-volume paracentesis.  Continues on octreotide drip, midodrine.  Received albumin boluses without improvement in renal function.  ? Contribution of Bladder outflow obstruction  Urinalysis shows trace protein.  Dipstick protein is 2+ compared to usual negative but likely overestimated on dipstick in [place].  - kidney function continues to worsen, non oliguric.      3 end-stage liver disease with portal hypertension-esophageal varices, frequent paracentesis need.  - Paracentesis done yesterday.      4 FEN-bicarb is low at 16.  Possibly contributed by respiratory alkalosis with liver failure as well.  No indication for bicarbonate replacement.     5 C. difficile colitis-on p.o. vancomycin.  Diarrhea improved.     6 acute on chronic anemia-concern for GI bleed given history of esophageal varices.  GI on board.  Getting PRBC as needed.  No plans for endoscopy at this time.     7 thrombocytopenia-secondary to liver disease.     Discussion / Recc -   - Generally poor prognosis. Has not responded to HRS directed therapy.   Not much more to offer from renal standpoint.   D/w pt. Labs in AM to see if relief of obstruction improves renal function. If it continues to worsen, then strongly consider palliative care involvement.   Generally poor candidate for dialysis if kidney function continues to worsen , given HRS/ not a txp candidate, poor compliance with all therapies.     Alpesh Cifuentes MD  [facility] - Nephrology   [phone]      Interval History :   Seen / examined.   Noted significant urinary retention ( ~1 L ) . Now has jiang in . Abd pain somewhat better after  relief of obstruction. No SBP .   Cr continues to worsen. UOP 1.8 L     Review of Systems:   A 4 point review of systems was negative except as noted above.  Notably: poor appetite. no nausea or vomiting or diarrhea.  no confusion,  no fever or chills    Physical Exam:   I/O last 3 completed shifts:  In: 580 [P.O.:480; I.V.:100]  Out: 1800 [Urine:1800]       GENERAL APPEARANCE: no distress,  awake  EYES: no scleral icterus, pupils equal  HENT: NC/AT,  mouth  without ulcers or lesions  Lymphatics: no cervical or supraclavicular LAD  Endo: no moon facies, no goiter  Pulmonary: lungs clear to auscultation with equal breath sounds bilaterally, no clubbing  CV: regular rhythm, normal rate, no rub   - JVP -   - Edema-  GI: tense, distended  MS: no evidence of inflammation in joints  : no jiang  SKIN: spider angiomas upper chest   NEURO: face symmetric, mild tremor, grossly nonfocal,  LABS:     Labs:   All labs reviewed by me  Electrolytes/Renal -   Recent Labs   Lab Test 08/11/21  0824 08/10/21  0752 08/10/21  0731 08/09/21  0808 08/04/21  1352 08/04/21  0929 08/03/21  2017 07/11/21  0628 07/10/21  0714 07/07/21  0827 07/03/21  0712   *  --  130* 134  134   < > 133 139   < > 135 134 136   POTASSIUM 4.2  --  4.3 4.4  4.3   < > 4.0 4.0   < > 4.3 4.0 4.1   CHLORIDE 105  --  103 108  107   < > 109 111*   < > 105 103 107   CO2 16*  --  18* 17*  18*   < > 18* 14*   < > 24 26 23   BUN 47*  --  48* 45*  45*   < > 41* 43*   < > 32* 32* 32*   CR 3.83*  --  3.51* 3.44*  3.43*   < > 2.27* 2.18*   < > 2.83* 2.84* 2.63*   * 108* 109* 106*  108*   < > 127* 52*   < > 81 108* 94   KEV 8.2*  --  8.1* 8.0*  8.1*   < > 8.4* 8.3*   < > 8.1* 8.4* 8.4*   MAG  --   --   --   --   --  1.6 1.7  --   --   --  1.9   PHOS  --   --   --   --   --  2.8  --   --  4.7* 2.8 3.4    < > = values in this interval not displayed.       CBC -   Recent Labs   Lab Test 08/11/21  0824 08/08/21  1305 08/07/21  1159 08/06/21  1157   WBC 3.8*  5.0  --  2.8*   HGB 8.0* 8.7* 9.2* 8.6*   * 108*  --  78*       LFTs -   Recent Labs   Lab Test 08/10/21  0731 08/09/21  0808 08/04/21  1153 08/03/21 2017 07/06/21  0732   ALKPHOS  --  118  --  176* 181*   BILITOTAL  --  0.6  --  1.0 0.5   ALT  --  18  --  17 20   AST  --  19  --  43 21   PROTTOTAL  --  6.4*  --  7.0 6.3*   ALBUMIN 3.4 3.2* 2.8* 2.9* 2.9*       Iron Panel -   Recent Labs   Lab Test 07/02/21  0816 06/30/21  0732 03/13/21  0946   IRON 46 16* 28*   IRONSAT 23 10* 15   KATE 178 37 92           Current Medications:    albumin human  12.5 g Intravenous Once     disulfiram  250 mg Oral Daily     finasteride  5 mg Oral Daily     folic acid  1 mg Oral Daily     gabapentin  200 mg Oral TID     lactulose  20 g Oral TID     lidocaine (buffered or not buffered)  5 mL Intradermal Once     midodrine  10 mg Oral TID w/meals     mirtazapine  30 mg Oral At Bedtime     multivitamin w/minerals  1 tablet Oral Daily     nicotine  1 patch Transdermal Daily     nicotine   Transdermal Q8H     octreotide  200 mcg Intravenous TID     pantoprazole  40 mg Oral BID AC     QUEtiapine  200 mg Oral At Bedtime     sodium chloride (PF)  3 mL Intracatheter Q8H     tamsulosin  0.8 mg Oral Daily     traZODone  100 mg Oral At Bedtime     vancomycin  125 mg Oral 4x Daily     vortioxetine  10 mg Oral BID       - MEDICATION INSTRUCTIONS -       Alpesh Cifuentes MD

## 2021-08-11 NOTE — CONSULTS
7729 ADDENDUM  Will plan for in person hospice consult 8/12 in the morning.  Bre SANABRIA aware and has updated pt      Hospice referral received from Bre SANABRIA.  Pt being worked up for eligibility and [facility] will establish contact with pt/family upon chart review and determination of staffing availability to admit.    Thank you for this referral.    Sue Marquis RN [facility]  Referral Specialist  C: [phone]  24 hour line: [phone]

## 2021-08-11 NOTE — PROVIDER NOTIFICATION
MD Notification    Notified Person: MD    Notified Person Name: Dr STEPHEN Whyte    Notification Date/Time: 8/11/21, 0511    Notification Interaction: web based paged    Purpose of Notification: Patient unable to void. Bladder scanned for over 400 ml. Already straight cath x 4 since admission. Should we leave a jiang in place    Orders Received: Order given for indwelling Jiang    Comments:

## 2021-08-11 NOTE — PROGRESS NOTES
[facility]  Gastroenterology Progress Note     Jim Richard MRN# [id number]   YOB: 1955 Age: 66 year old          Assessment and Plan:   Jim Richard is a pleasant 66 year old male with alcoholic liver cirrhosis complicated with esophageal varices, abdominal ascites, hepatorenal syndrome, pancytopenia and c/o abdominal pain. GI consulted on 6/7/21.     Active Problems:  Alcoholic liver cirrhosis  Alcholic intoxication  Elevated Creatinine- hepatorenal syndrome  Abdominal pain  Abdominal ascites- concern for SBP  Hypoalbuminemia  Pancytopenia  Patient has suffered from chronic alcoholism and developed significant complications with decompensated liver cirrhosis. He has required paracentesis every 2 weeks. Has had elevating creatinine- currently 3.83 well above admission creatinine at 2.18. He has type 2 hepatorenal syndrome. Has pancytopenia due to bone marrow suppression.  Albumin 3.4(last checked on 8/4)  Platelets 108 Hemoglobin 6.9->7.7->8.5->8.3->8.7  INR 1.34->1.41 Tbili 1.0, Creatinine 3.43, MELD score 23 ( 20% mortality)  He is not a candidate for liver transplants given poor compliance and continued consumption of alcohol.   8/9 paracentesis  removed 3.8 L of fluid- no cell count available  Still has diffuse abdominal pain. Cell count from paracentesis notes no evidence of SBP.  Cause of abdominal pain likely ascites, and multi organ failure     -- Hemoglobin stable no plans for endoscopic procedure at this time  -- Continue midodrine and pantoprazole and octreotide  -- Monitor CBC and CMP  -- Regular diet  -- Hold albumin- Creatinine not improving and now albumin at normal level.   -- Appreciate Nephrology consult (recommend paracentesis as needed with 12.5 g albumin replacement for every L removed, remove no more than 5L per paracentesis). Poor candidate for dialysis  -- There are no other treatments from GI standpoint to improve outcome or symptoms. Would  strongly encourage palliative care consult     C Difficile diarrhea  Continue vancomycin 125 mg QID  Diarrhea improved              Interval History:   denies chest pain, denies shortness of breath, alert, oriented to person, place and time and c/o abdominal pain ongoing              Review of Systems:   C: NEGATIVE for fever, chills, change in weight  E/M: NEGATIVE for ear, mouth and throat problems  R: NEGATIVE for significant cough or SOB  CV: NEGATIVE for chest pain, palpitations or peripheral edema             Medications:   I have reviewed this patient's current medications    albumin human  12.5 g Intravenous Once     albumin human  25 g Intravenous Daily     disulfiram  250 mg Oral Daily     finasteride  5 mg Oral Daily     folic acid  1 mg Oral Daily     gabapentin  200 mg Oral TID     lactulose  20 g Oral TID     lidocaine (buffered or not buffered)  5 mL Intradermal Once     midodrine  10 mg Oral TID w/meals     mirtazapine  30 mg Oral At Bedtime     multivitamin w/minerals  1 tablet Oral Daily     nicotine  1 patch Transdermal Daily     nicotine   Transdermal Q8H     octreotide  200 mcg Intravenous TID     pantoprazole  40 mg Oral BID AC     QUEtiapine  200 mg Oral At Bedtime     sodium chloride (PF)  3 mL Intracatheter Q8H     tamsulosin  0.8 mg Oral Daily     traZODone  100 mg Oral At Bedtime     vancomycin  125 mg Oral 4x Daily     vortioxetine  10 mg Oral BID                  Physical Exam:   Vitals were reviewed  Vital Signs with Ranges  Temp:  [98.1  F (36.7  C)-98.4  F (36.9  C)] 98.3  F (36.8  C)  Pulse:  [70-83] 83  Resp:  [16-18] 16  BP: (126-137)/(69-76) 126/76  SpO2:  [92 %-96 %] 92 %  I/O last 3 completed shifts:  In: 580 [P.O.:480; I.V.:100]  Out: 1800 [Urine:1800]  Constitutional: alert, moderate distress, cooperative and pale   Cardiovascular: negative, PMI normal. No lifts, heaves, or thrills. RRR. No murmurs, clicks gallops or rub  Respiratory: negative, Percussion normal. Good  diaphragmatic excursion. Lungs clear  Abdomen: Abdomen soft, non-tender. BS normal. No masses, organomegaly, positive findings: umbilical hernia, distended, tenderness moderate generalized, ascites           Data:   I reviewed the patient's new clinical lab test results.   Recent Labs   Lab Test 08/11/21  0824 08/08/21  1305 08/07/21  1159 08/06/21  1157   WBC 3.8* 5.0  --  2.8*   HGB 8.0* 8.7* 9.2* 8.6*   MCV 96 97  --  95   * 108*  --  78*   INR 1.41* 1.34*  --  1.39*     Recent Labs   Lab Test 08/11/21  0824 08/10/21  0731 08/09/21  0808   POTASSIUM 4.2 4.3 4.4  4.3   CHLORIDE 105 103 108  107   CO2 16* 18* 17*  18*   BUN 47* 48* 45*  45*   ANIONGAP 11 9 9  9     Recent Labs   Lab Test 08/10/21  0731 08/09/21  0808 08/05/21  0532 08/04/21  1153 08/03/21  2325 08/03/21  2017 07/07/21  0420 07/06/21  0732 06/16/21  1123 06/13/21  1150 04/04/21  1017 03/26/21  2131   ALBUMIN 3.4 3.2*  --  2.8*  --  2.9*  --  2.9*  --   --    < > 2.7*   BILITOTAL  --  0.6  --   --   --  1.0  --  0.5   < >  --    < > 2.2*   ALT  --  18  --   --   --  17  --  20   < >  --    < > 26   AST  --  19  --   --   --  43  --  21   < >  --    < > 36   PROTEIN  --   --   --   --  100 *  --  Negative  --   --  Negative   < >  --    LIPASE  --   --  228  --   --  604*  --   --   --   --   --  444*    < > = values in this interval not displayed.       I reviewed the patient's new imaging results.    All laboratory data reviewed  All imaging studies reviewed by me.    Gloria De Leon PA-C,  8/11/2021  [facility]  Office : [phone]  Cell: [phone] (Dr. Shaw)  Cell: [phone] (Gloria De Leon PA-C)

## 2021-08-11 NOTE — PROGRESS NOTES
[facility]    Medicine Progress Note - Hospitalist Service       Date of Admission:  8/3/2021    Assessment & Plan         Jim Richard is a 66-year-old gentleman with a complex past medical history of alcohol abuse/dependence complicated by alcoholic liver disease with ascites, status post multiple paracentesis in the past, hepatorenal syndrome, chronic kidney disease stage IV, history of recent bacteremia due to Enterococcus, hypertension, COPD, tobacco use disorder, depression/anxiety, thrombocytopenia and obstructive sleep apnea, who presented to ER for evaluation of generalized weakness, diarrhea and dry heaves.     Decompensated alcoholic liver cirrhosis with recurrent ascites  Portal Hypertension  Possible hepatorenal syndrome with CKD stage IV  Patient has been having issues with recurrent ascites requiring multiple paracenteses.  It seems that last paracentesis was on 07/26/2021 when 8.7 liters of fluid was removed before that had paracentesis on 07/2015 with 5.2 liters fluid removed.  Presents with recurrent abdominal distention  * Paracentesis (8/4):  Removed over 6 L and given Albumin (SAAG 1.9 consistent with portal Hypertension). Fluid wbc 201 not indicative of SBP.   -Repeat paracentesis on 8/9, with removal of 3.8 L of fluids.  WBC again will check, 220 with neutrophil- 8%, not indicative of SBP  -Continue PTA Lactulose,increased to  lactulose to 20 g 3 times daily  -on Midodrine and Octreotide IV TID  - cr on admission 2.18 and has been trending up and 3.83 despite above measures  - nephrology following, appreciate assistance, poor candidate for dialysis and given HRS not a tx candidate  - appreciate GI assistance, no other treatment from GI stand point to improve outcome or symptom    Goals of care  Had discussion with Jim his overall prognosis, liver failure, recurrent ascites and worsening renal function. Jim states he understands his prognosis. He states even if  dialysis is recommended, it is not something he would want to do. He states he wants to go home and die at home. Discussed about hospice and states he is interested in enrolling hospice  - social work consult for hospice  - updated his brother, Jame, by phone per patient request    C Diff colitis.  Improved   Patient reports diarrhea for about a week prior to presentation.  C diff PCR positive in the ED   - continue PO Vancomycin to complete 10 days course / enteric precautions  - Continue to monitor stool output      Alcoholism/alcohol dependence  Alcohol intoxication  History of alcohol withdrawals  History of depression/anxiety  Patient had a recent hospitalization at [facility] from where he was discharged to TCU.  After he returned back home, he started drinking again.  He admits he drinks 1 pint of vodka daily.  He stated that he has a .  His ethanol level is 0.23.    - Continue multivitamins, folic acid and thiamine while here  - Psychiatry evaluated the patient on 8/6, see note for details  - Continue medications: Mirtazapine, Trinellix, Seroquel, Trazodone   - Patient is supposedly already committed and social work is following to help with disposition      Acute on chronic anemia   Hemoglobin dropped to a fabiola of 6.9.  Status post 1 unit PRBC transfused 8/5  -Hemoglobin has remained stable around 8 since transfusion  - No further GI bleed  - Conditional order to transfuse for hgb <7   - GI following now and appreciate their recommendations.  No plans for endoscopy at this time       Atypical chest pain. Resolved   Patient complained of chest pain since arrival to the ED.  Troponins negative.  EKG without evidence of acute ischemia  - Continue to monitor      Generalized weakness  Failure to thrive  This is likely multifactorial from his chronic alcohol use, alcoholic liver cirrhosis, poor oral intake, and recent diarrhea.    - PT & /care coordinator  consulted     Hypoglycemia  - resolved  Blood sugars were in the 40s upon arrival in the ER.  This is likely due to poor oral intake.  He was given a couple of doses of D50 with improvement of his blood sugars.    - Hypoglycemia protocol in place  - BS > 100.Monitor for now      Tobacco use disorder  - Nicotine patch had been ordered     Recent Enterococcus bacteremia  He had been treated with ampicillin IV for 2 weeks as per ID recommendations.  He denies any fevers or leukocytosis at this time.     H/o BPH  - PTA Flomax and Proscar     History of COPD  This does not seem to be an acute issue at this time.    - PTA inhalers      Obstructive sleep apnea   Noncompliant with CPAP.     Thrombocytopenia, chronic, related to his chronic liver disease  There is no evidence of active bleeding.  Platelet count on admission was 148.    - stable 114 most recent lab,    Urinary retention  - s/p jiang cath placement on 8/10.  - continue jiang at this time for ongoing comfort       Diet: Snacks/Supplements Adult: Ensure Enlive; Between Meals  Advance Diet as Tolerated: Regular Diet Adult    DVT Prophylaxis: Pneumatic Compression Devices  Jiang Catheter: PRESENT, indication: Retention  Central Lines: None  Code Status: No CPR- Do NOT Intubate      Disposition Plan   Expected discharge: 08/12/2021 recommended to hospice once hospice discharge arranged.     The patient's care was discussed with the Bedside Nurse, Care Coordinator/, Patient and Patient's Family.    Chely Calhoun MD  Hospitalist Service  [facility]  Securely message with the Vocera Web Console (learn more here)  Text page via [employer] Paging/Directory      Clinically Significant Risk Factors Present on Admission                ______________________________________________________________________    Interval History   Continues to complain of abdominal discomfort. Had jiang inserted last night for retention. Does not thing  it helped much with his abdominal pain.   Goals of care discussion was held with patient. As mentioned above he wants to be hospice.     Data reviewed today: I reviewed all medications, new labs and imaging results over the last 24 hours. I personally reviewed no images or EKG's today.    Physical Exam   Vital Signs: Temp: 98.3  F (36.8  C) Temp src: Oral BP: 126/76 Pulse: 83   Resp: 16 SpO2: 92 % O2 Device: None (Room air)    Weight: 175 lbs 14.83 oz  General Appearance: Alert, awake and no apparent distress  Respiratory: CTAB  Cardiovascular: regular rate and rhythm  GI: distended  Skin: warm and dry      Data   Recent Labs   Lab 08/11/21  0824 08/10/21  0752 08/10/21  0731 08/09/21  0808 08/08/21  1305 08/07/21  1159 08/07/21  0607 08/06/21  1157 08/05/21  0532 08/04/21  1635 08/04/21  1427   WBC 3.8*  --   --   --  5.0  --   --  2.8* 2.9*   < >  --    HGB 8.0*  --   --   --  8.7* 9.2*  --  8.6* 8.3*   < >  --    MCV 96  --   --   --  97  --   --  95 94   < >  --    *  --   --   --  108*  --   --  78* 85*   < >  --    INR 1.41*  --   --   --  1.34*  --   --  1.39*  --   --   --    *  --  130* 134  134 131*  --   --  132* 133   < >  --    POTASSIUM 4.2  --  4.3 4.4  4.3 4.2  --   --  4.5 4.4   < >  --    CHLORIDE 105  --  103 108  107 106  --   --  107 107   < >  --    CO2 16*  --  18* 17*  18* 17*  --   --  20 22   < >  --    BUN 47*  --  48* 45*  45* 44*  --   --  39* 40*   < >  --    CR 3.83*  --  3.51* 3.44*  3.43* 3.33*  --   --  3.13* 2.57*   < >  --    ANIONGAP 11  --  9 9  9 8  --   --  5 4   < >  --    KEV 8.2*  --  8.1* 8.0*  8.1* 7.8*  --   --  8.2* 8.4*   < >  --    * 108* 109* 106*  108* 175*  --    < > 164* 114*   < >  --    ALBUMIN  --   --  3.4 3.2*  --   --   --   --   --   --   --    PROTTOTAL  --   --   --  6.4*  --   --   --   --   --   --   --    BILITOTAL  --   --   --  0.6  --   --   --   --   --   --   --    ALKPHOS  --   --   --  118  --   --   --   --   --    --   --    ALT  --   --   --  18  --   --   --   --   --   --   --    AST  --   --   --  19  --   --   --   --   --   --   --    LIPASE  --   --   --   --   --   --   --   --  228  --   --    TROPONIN  --   --   --   --   --   --   --   --   --   --  <0.015    < > = values in this interval not displayed.     No results found for this or any previous visit (from the past 24 hour(s)).  Medications     - MEDICATION INSTRUCTIONS -         albumin human  12.5 g Intravenous Once     disulfiram  250 mg Oral Daily     finasteride  5 mg Oral Daily     folic acid  1 mg Oral Daily     gabapentin  200 mg Oral TID     lactulose  20 g Oral TID     lidocaine (buffered or not buffered)  5 mL Intradermal Once     midodrine  10 mg Oral TID w/meals     mirtazapine  30 mg Oral At Bedtime     multivitamin w/minerals  1 tablet Oral Daily     nicotine  1 patch Transdermal Daily     nicotine   Transdermal Q8H     octreotide  200 mcg Intravenous TID     pantoprazole  40 mg Oral BID AC     QUEtiapine  200 mg Oral At Bedtime     sodium chloride (PF)  3 mL Intracatheter Q8H     tamsulosin  0.8 mg Oral Daily     traZODone  100 mg Oral At Bedtime     vancomycin  125 mg Oral 4x Daily     vortioxetine  10 mg Oral BID

## 2021-08-11 NOTE — PLAN OF CARE
DATE & TIME: 08/10/2021 2310-4810            Cognitive Concerns/ Orientation : Alert/Oriented x 4, flat affect   BEHAVIOR & AGGRESSION TOOL COLOR: Green  CIWA SCORE: na  ABNL VS/O2: VSS, room air, fine crackles on LLL.   MOBILITY: Assist-1 gait belt/walker. Up in chair for meals.   PAIN MANAGMENT: Oxycodone x1 (Q6 hours) for abdominal and back pain  DIET: Regular, fair appetite   BOWEL/BLADDER: Retaining straight cathed this shift for 450ml, bathroom for BM. No BM for over 48 hours. Pt had had poor appetite as well. MD notified, increased lactulose to TID.  ABNL LAB/BG: Cr. 3.51, Na 130  DRAIN/DEVICES: R PIV saline locked  TELEMETRY RHYTHM: Normal sinus rhythm with BBB. Tele discontinued today.   SKIN: Scattered bruises and scabs, fragile skin; abdomen distended, no relief from paracentesis yesterday.   TESTS/PROCEDURES: Paracentesis done on 8/9/2021 with 3750 cc removed. Right lower abdominal dressing intact.  D/C DATE: Pending  OTHER IMPORTANT INFO: Enteric isolation maintained for C.Diff; Nausea with movement. GI and nephrology following. GI recommending palliative care.

## 2021-08-12 ENCOUNTER — DOCUMENTATION ONLY (OUTPATIENT)
Dept: OTHER | Facility: CLINIC | Age: 66
End: 2021-08-12

## 2021-08-12 LAB
GLUCOSE BLDC GLUCOMTR-MCNC: 105 MG/DL (ref 70–99)
GLUCOSE BLDC GLUCOMTR-MCNC: 166 MG/DL (ref 70–99)

## 2021-08-12 PROCEDURE — 250N000013 HC RX MED GY IP 250 OP 250 PS 637: Performed by: INTERNAL MEDICINE

## 2021-08-12 PROCEDURE — 120N000001 HC R&B MED SURG/OB

## 2021-08-12 PROCEDURE — 99232 SBSQ HOSP IP/OBS MODERATE 35: CPT | Performed by: INTERNAL MEDICINE

## 2021-08-12 PROCEDURE — 250N000011 HC RX IP 250 OP 636: Performed by: INTERNAL MEDICINE

## 2021-08-12 PROCEDURE — 99233 SBSQ HOSP IP/OBS HIGH 50: CPT | Performed by: INTERNAL MEDICINE

## 2021-08-12 RX ADMIN — VORTIOXETINE 10 MG: 10 TABLET, FILM COATED ORAL at 19:04

## 2021-08-12 RX ADMIN — MULTIPLE VITAMINS W/ MINERALS TAB 1 TABLET: TAB at 08:51

## 2021-08-12 RX ADMIN — GABAPENTIN 200 MG: 100 CAPSULE ORAL at 13:54

## 2021-08-12 RX ADMIN — MIDODRINE HYDROCHLORIDE 10 MG: 5 TABLET ORAL at 17:25

## 2021-08-12 RX ADMIN — VANCOMYCIN HYDROCHLORIDE 125 MG: 125 CAPSULE ORAL at 17:25

## 2021-08-12 RX ADMIN — VANCOMYCIN HYDROCHLORIDE 125 MG: 125 CAPSULE ORAL at 12:49

## 2021-08-12 RX ADMIN — OXYCODONE HYDROCHLORIDE 10 MG: 5 TABLET ORAL at 18:59

## 2021-08-12 RX ADMIN — MIRTAZAPINE 30 MG: 15 TABLET, FILM COATED ORAL at 21:05

## 2021-08-12 RX ADMIN — TAMSULOSIN HYDROCHLORIDE 0.8 MG: 0.4 CAPSULE ORAL at 08:52

## 2021-08-12 RX ADMIN — PANTOPRAZOLE SODIUM 40 MG: 40 TABLET, DELAYED RELEASE ORAL at 17:26

## 2021-08-12 RX ADMIN — GABAPENTIN 200 MG: 100 CAPSULE ORAL at 08:51

## 2021-08-12 RX ADMIN — FOLIC ACID 1 MG: 1 TABLET ORAL at 08:51

## 2021-08-12 RX ADMIN — HYDROXYZINE HYDROCHLORIDE 50 MG: 25 TABLET ORAL at 01:05

## 2021-08-12 RX ADMIN — QUETIAPINE FUMARATE 200 MG: 200 TABLET ORAL at 21:05

## 2021-08-12 RX ADMIN — FINASTERIDE 5 MG: 5 TABLET, FILM COATED ORAL at 08:51

## 2021-08-12 RX ADMIN — VORTIOXETINE 10 MG: 10 TABLET, FILM COATED ORAL at 08:51

## 2021-08-12 RX ADMIN — TRAZODONE HYDROCHLORIDE 100 MG: 100 TABLET ORAL at 21:05

## 2021-08-12 RX ADMIN — VANCOMYCIN HYDROCHLORIDE 125 MG: 125 CAPSULE ORAL at 08:51

## 2021-08-12 RX ADMIN — MIDODRINE HYDROCHLORIDE 10 MG: 5 TABLET ORAL at 08:51

## 2021-08-12 RX ADMIN — ONDANSETRON 4 MG: 4 TABLET, ORALLY DISINTEGRATING ORAL at 20:38

## 2021-08-12 RX ADMIN — LACTULOSE 20 G: 20 SOLUTION ORAL at 08:52

## 2021-08-12 RX ADMIN — LACTULOSE 20 G: 20 SOLUTION ORAL at 21:05

## 2021-08-12 RX ADMIN — LACTULOSE 20 G: 20 SOLUTION ORAL at 17:24

## 2021-08-12 RX ADMIN — GABAPENTIN 200 MG: 100 CAPSULE ORAL at 19:01

## 2021-08-12 RX ADMIN — MIDODRINE HYDROCHLORIDE 10 MG: 5 TABLET ORAL at 12:49

## 2021-08-12 RX ADMIN — OXYCODONE HYDROCHLORIDE 10 MG: 5 TABLET ORAL at 01:05

## 2021-08-12 RX ADMIN — VANCOMYCIN HYDROCHLORIDE 125 MG: 125 CAPSULE ORAL at 19:01

## 2021-08-12 RX ADMIN — DISULFIRAM 250 MG: 250 TABLET ORAL at 08:51

## 2021-08-12 RX ADMIN — ONDANSETRON 4 MG: 4 TABLET, ORALLY DISINTEGRATING ORAL at 13:53

## 2021-08-12 RX ADMIN — NICOTINE 1 PATCH: 14 PATCH, EXTENDED RELEASE TRANSDERMAL at 08:55

## 2021-08-12 RX ADMIN — PANTOPRAZOLE SODIUM 40 MG: 40 TABLET, DELAYED RELEASE ORAL at 06:51

## 2021-08-12 ASSESSMENT — ACTIVITIES OF DAILY LIVING (ADL)
ADLS_ACUITY_SCORE: 13

## 2021-08-12 NOTE — PLAN OF CARE
DATE & TIME: 8/12/21                      Cognitive Concerns/ Orientation : A&O x 4, flat affect, coop, pleasant.   BEHAVIOR & AGGRESSION TOOL COLOR: Green  CIWA SCORE: NA    ABNL VS/O2: VSS on RA  MOBILITY: Assist x 1 with GB/walker, chair for meals, amb to BR.   PAIN MANAGMENT: Declines  DIET: Regular, poor appetite.   BOWEL/BLADDER: Leger patent. No BM, receiving lactulose TID.   ABNL LAB/BG: NA  DRAIN/DEVICES: PIV SL. Leger cath.  TELEMETRY RHYTHM: NA  SKIN: Scattered scabs and bruises. Mepilex in place to cocyx.  TESTS/PROCEDURES: None today. Last Paracentesis on 8/9.  D/C DATE: Pend hospice plan.   OTHER IMPORTANT INFO: Abd distended, pt reports feeling full/discomfort at times. C. Diff, Enteric iso, receiving PO vanco. Nausea and emesis x2 in afternoon, Zofran given. Hospice met with pt. GI/Nephrology/ PT signed off.

## 2021-08-12 NOTE — PROGRESS NOTES
Nephrology Progress Note  08/12/2021         ASSESSMENT AND RECOMMENDATIONS:   1 CKD 4 at baseline-widely fluctuating creatinine around 2.5-3 at baseline.  Likely related to multiple acute kidney injury episodes and hepatorenal physiology.  [place] urine at baseline.     2 acute kidney injury-likely secondary hepatorenal syndrome.  Needs frequent large-volume paracentesis.  Continues on octreotide drip, midodrine.  Received albumin boluses without improvement in renal function.  ? Contribution of Bladder outflow obstruction  Urinalysis shows trace protein.  Dipstick protein is 2+ compared to usual negative but likely overestimated on dipstick in [place].  - kidney function continues to worsen, non oliguric.      3 end-stage liver disease with portal hypertension-esophageal varices, frequent paracentesis need.     4 FEN-bicarb is low at 16.  Possibly contributed by respiratory alkalosis with liver failure as well.      5 C. difficile colitis-on p.o. vancomycin.  Diarrhea improved.     6 acute on chronic anemia-concern for GI bleed given history of esophageal varices.  GI on board.  Getting PRBC as needed.  No plans for endoscopy at this time.     7 thrombocytopenia-secondary to liver disease.     Discussion / Recc -   - Generally poor prognosis. Has not responded to HRS directed therapy.   Not much more to offer from renal standpoint.   D/w patient and primary team. Plan to discharge with hospice. No plans to check more labs at this time. Pt does not want to pursue dialysis ( not needed yet) and he will be a very poor candidate for it if we reach that point.     Will sign off. Please call back with questions.     Alpesh Cifuentes MD  [facility] - Nephrology   [phone]      Interval History :   Seen / examined.   Feels weak. Can barely get out of bed.   Chart reviewed and discussed with pt. He has decided to pursue hospice and will not pursue dialysis even if offered.    cc    Review of Systems:   A 4  point review of systems was negative except as noted above.  Notably: poor appetite. no nausea or vomiting or diarrhea.  no confusion,  no fever or chills    Physical Exam:   I/O last 3 completed shifts:  In: 340 [P.O.:240; I.V.:100]  Out: 1000 [Urine:750; Emesis/NG output:250]       GENERAL APPEARANCE: no distress,  awake  EYES: no scleral icterus, pupils equal  HENT: NC/AT,  mouth  without ulcers or lesions  Lymphatics: no cervical or supraclavicular LAD  Endo: no moon facies, no goiter  Pulmonary: lungs clear to auscultation with equal breath sounds bilaterally, no clubbing  CV: regular rhythm, normal rate, no rub   - JVP -   - Edema-  GI: tense, distended  MS: no evidence of inflammation in joints  : no jiang  SKIN: spider angiomas upper chest   NEURO: face symmetric, mild tremor, grossly nonfocal,  LABS:     Labs:   All labs reviewed by me  Electrolytes/Renal -   Recent Labs   Lab Test 08/12/21  1235 08/12/21  0823 08/11/21  0824 08/10/21  0731 08/09/21  0808 08/09/21  0808 08/04/21  1352 08/04/21  0929 08/03/21  2017 07/11/21  0628 07/10/21  0714 07/07/21  0827 07/03/21  0712   NA  --   --  132* 130*  --  134  134   < > 133 139   < > 135 134 136   POTASSIUM  --   --  4.2 4.3  --  4.4  4.3   < > 4.0 4.0   < > 4.3 4.0 4.1   CHLORIDE  --   --  105 103  --  108  107   < > 109 111*   < > 105 103 107   CO2  --   --  16* 18*  --  17*  18*   < > 18* 14*   < > 24 26 23   BUN  --   --  47* 48*  --  45*  45*   < > 41* 43*   < > 32* 32* 32*   CR  --   --  3.83* 3.51*  --  3.44*  3.43*   < > 2.27* 2.18*   < > 2.83* 2.84* 2.63*   * 105* 109* 109*   < > 106*  108*   < > 127* 52*   < > 81 108* 94   KEV  --   --  8.2* 8.1*  --  8.0*  8.1*   < > 8.4* 8.3*   < > 8.1* 8.4* 8.4*   MAG  --   --   --   --   --   --   --  1.6 1.7  --   --   --  1.9   PHOS  --   --   --   --   --   --   --  2.8  --   --  4.7* 2.8 3.4    < > = values in this interval not displayed.       CBC -   Recent Labs   Lab Test 08/11/21  0887  08/08/21  1305 08/07/21  1159 08/06/21  1157   WBC 3.8* 5.0  --  2.8*   HGB 8.0* 8.7* 9.2* 8.6*   * 108*  --  78*       LFTs -   Recent Labs   Lab Test 08/10/21  0731 08/09/21  0808 08/04/21  1153 08/03/21 2017 07/06/21  0732   ALKPHOS  --  118  --  176* 181*   BILITOTAL  --  0.6  --  1.0 0.5   ALT  --  18  --  17 20   AST  --  19  --  43 21   PROTTOTAL  --  6.4*  --  7.0 6.3*   ALBUMIN 3.4 3.2* 2.8* 2.9* 2.9*       Iron Panel -   Recent Labs   Lab Test 07/02/21  0816 06/30/21  0732 03/13/21  0946   IRON 46 16* 28*   IRONSAT 23 10* 15   KATE 178 37 92           Current Medications:    albumin human  12.5 g Intravenous Once     finasteride  5 mg Oral Daily     folic acid  1 mg Oral Daily     gabapentin  200 mg Oral TID     lactulose  20 g Oral TID     lidocaine (buffered or not buffered)  5 mL Intradermal Once     midodrine  10 mg Oral TID w/meals     mirtazapine  30 mg Oral At Bedtime     nicotine  1 patch Transdermal Daily     nicotine   Transdermal Q8H     pantoprazole  40 mg Oral BID AC     QUEtiapine  200 mg Oral At Bedtime     sodium chloride (PF)  3 mL Intracatheter Q8H     tamsulosin  0.8 mg Oral Daily     traZODone  100 mg Oral At Bedtime     vancomycin  125 mg Oral 4x Daily     vortioxetine  10 mg Oral BID       - MEDICATION INSTRUCTIONS -       Alpesh Cifuentes MD

## 2021-08-12 NOTE — PROGRESS NOTES
[facility]    Medicine Progress Note - Hospitalist Service       Date of Admission:  8/3/2021    Assessment & Plan         Jim Richard is a 66-year-old gentleman with a complex past medical history of alcohol abuse/dependence complicated by alcoholic liver disease with ascites, status post multiple paracentesis in the past, hepatorenal syndrome, chronic kidney disease stage IV, history of recent bacteremia due to Enterococcus, hypertension, COPD, tobacco use disorder, depression/anxiety, thrombocytopenia and obstructive sleep apnea, who presented to ER for evaluation of generalized weakness, diarrhea and dry heaves.     Decompensated alcoholic liver cirrhosis with recurrent ascites  Portal Hypertension  Possible hepatorenal syndrome with CKD stage IV  Patient has been having issues with recurrent ascites requiring multiple paracenteses.  It seems that last paracentesis was on 07/26/2021 when 8.7 liters of fluid was removed before that had paracentesis on 07/2015 with 5.2 liters fluid removed.  Presents with recurrent abdominal distention  * Paracentesis (8/4):  Removed over 6 L and given Albumin (SAAG 1.9 consistent with portal Hypertension). Fluid wbc 201 not indicative of SBP.   -Repeat paracentesis on 8/9, with removal of 3.8 L of fluids.   with neutrophil- 8%, not indicative of SBP  -Continue PTA Lactulose,increased to  lactulose to 20 g 3 times daily  -continue midodrine. Stopped octreotide since goal hospice now.   - cr on admission 2.18 and has been trending up and 3.83 despite above measures  - nephrology following, appreciate assistance, poor candidate for dialysis and given HRS not a tx candidate.   - appreciate GI assistance, no other treatment from GI stand point to improve outcome or symptom      Goals of care  8/11:Had discussion with Jim his overall prognosis, liver failure, recurrent ascites and worsening renal function. Jim states he understands his  prognosis. He states even if dialysis is recommended, it is not something he would want to do. He states he wants to go home and die at home. Discussed about hospice and states he is interested in enrolling hospice  8/12: - Discussed with Kendra from IR, peritoneal catheter would be an option to drain ascites fluid if patient to discharge with hospice, however he needs to have stable living situation or caregiver who would manage that. Patient to have meeting with hospice and social work working on discharge planning.   - patient meeting with hospice today  - if patient discharging to a facility, will pursue peritoneal cathter placement.   - patient's daughter, Aleksandra updated by phone. She wants to be the  for updates.      C Diff colitis.  Improved   Patient reports diarrhea for about a week prior to presentation.  C diff PCR positive in the ED   - continue PO Vancomycin to complete 10 days course / enteric precautions--day 9/10  - Continue to monitor stool output      Alcoholism/alcohol dependence  Alcohol intoxication  History of alcohol withdrawals  History of depression/anxiety  Patient had a recent hospitalization at [facility] from where he was discharged to TCU.  After he returned back home, he started drinking again.  He admits he drinks 1 pint of vodka daily.  He stated that he has a .  His ethanol level is 0.23.    - Psychiatry evaluated the patient on 8/6, see note for details  - Continue medications: Mirtazapine, Trinellix, Seroquel, Trazodone   - stop vitamins given hospice  - Patient is supposedly already committed and social work is following to help with disposition      Acute on chronic anemia   Hemoglobin dropped to a fabiola of 6.9.  Status post 1 unit PRBC transfused 8/5  -Hemoglobin has remained stable around 8 since transfusion  - No further GI bleed  - GI following now and appreciate their recommendations.  No plans for endoscopy at this time    - no  further blood draws at this time     Atypical chest pain. Resolved   Patient complained of chest pain since arrival to the ED.  Troponins negative.  EKG without evidence of acute ischemia     Generalized weakness  Failure to thrive  This is likely multifactorial from his chronic alcohol use, alcoholic liver cirrhosis, poor oral intake, and recent diarrhea.    - plan as above to discharge with hospice     Hypoglycemia  - resolved  Blood sugars were in the 40s upon arrival in the ER.  This is likely due to poor oral intake.  He was given a couple of doses of D50 with improvement of his blood sugars.    - Hypoglycemia protocol in place  - BS > 100.Monitor for now      Tobacco use disorder  - Nicotine patch had been ordered     Recent Enterococcus bacteremia  He had been treated with ampicillin IV for 2 weeks as per ID recommendations.  He denies any fevers or leukocytosis at this time.     H/o BPH  - PTA Flomax and Proscar     History of COPD  This does not seem to be an acute issue at this time.    - PTA inhalers      Obstructive sleep apnea   Noncompliant with CPAP.     Thrombocytopenia, chronic, related to his chronic liver disease  There is no evidence of active bleeding.  Platelet count on admission was 148.    - stable 114 most recent lab,    Urinary retention  - s/p jiang cath placement on 8/10.  - continue jiang at this time for ongoing comfort    Severe malnutrition  - in the context of acute illness, chronic illness or disease  - supplements between meals per dietitian          Diet: Snacks/Supplements Adult: Ensure Enlive; Between Meals  Advance Diet as Tolerated: Regular Diet Adult    DVT Prophylaxis: Pneumatic Compression Devices  Jiang Catheter: PRESENT, indication: Retention  Central Lines: None  Code Status: No CPR- Do NOT Intubate      Disposition Plan   Expected discharge: 08/12/2021 recommended to hospice once hospice discharge arranged.     The patient's care was discussed with the Bedside Nurse,  Care Coordinator/, Patient and Patient's Family.    Chely Calhoun MD  Hospitalist Service  [facility]  Securely message with the Vocera Web Console (learn more here)  Text page via [employer] Paging/Directory      Clinically Significant Risk Factors Present on Admission                ______________________________________________________________________    Interval History   Patient states he is very weak today and had hard time transferring from bed to chair.   Denies nausea at time of visit. Continues to have abdominal pain, but states it feels less distended today.    Data reviewed today: I reviewed all medications, new labs and imaging results over the last 24 hours. I personally reviewed no images or EKG's today.    Physical Exam   Vital Signs: Temp: 98  F (36.7  C) Temp src: Oral BP: 116/71 Pulse: 72   Resp: 16 SpO2: 93 % O2 Device: None (Room air)    Weight: 175 lbs 14.83 oz  General Appearance: Alert, awake and no apparent distress, sitting up in a chair  Respiratory: CTAB  Cardiovascular: regular rate and rhythm  GI: distended  Skin: warm and dry      Data   Recent Labs   Lab 08/12/21  0823 08/11/21  0824 08/10/21  0752 08/10/21  0731 08/09/21  0808 08/08/21  1305 08/07/21  1159 08/07/21  0607 08/06/21  1157   WBC  --  3.8*  --   --   --  5.0  --   --  2.8*   HGB  --  8.0*  --   --   --  8.7* 9.2*  --  8.6*   MCV  --  96  --   --   --  97  --   --  95   PLT  --  114*  --   --   --  108*  --   --  78*   INR  --  1.41*  --   --   --  1.34*  --   --  1.39*   NA  --  132*  --  130* 134  134 131*  --   --  132*   POTASSIUM  --  4.2  --  4.3 4.4  4.3 4.2  --   --  4.5   CHLORIDE  --  105  --  103 108  107 106  --   --  107   CO2  --  16*  --  18* 17*  18* 17*  --   --  20   BUN  --  47*  --  48* 45*  45* 44*  --   --  39*   CR  --  3.83*  --  3.51* 3.44*  3.43* 3.33*  --   --  3.13*   ANIONGAP  --  11  --  9 9  9 8  --   --  5   KEV  --  8.2*  --  8.1* 8.0*  8.1*  7.8*  --   --  8.2*   * 109* 108* 109* 106*  108* 175*  --    < > 164*   ALBUMIN  --   --   --  3.4 3.2*  --   --   --   --    PROTTOTAL  --   --   --   --  6.4*  --   --   --   --    BILITOTAL  --   --   --   --  0.6  --   --   --   --    ALKPHOS  --   --   --   --  118  --   --   --   --    ALT  --   --   --   --  18  --   --   --   --    AST  --   --   --   --  19  --   --   --   --     < > = values in this interval not displayed.     No results found for this or any previous visit (from the past 24 hour(s)).  Medications     - MEDICATION INSTRUCTIONS -         albumin human  12.5 g Intravenous Once     finasteride  5 mg Oral Daily     folic acid  1 mg Oral Daily     gabapentin  200 mg Oral TID     lactulose  20 g Oral TID     lidocaine (buffered or not buffered)  5 mL Intradermal Once     midodrine  10 mg Oral TID w/meals     mirtazapine  30 mg Oral At Bedtime     nicotine  1 patch Transdermal Daily     nicotine   Transdermal Q8H     pantoprazole  40 mg Oral BID AC     QUEtiapine  200 mg Oral At Bedtime     sodium chloride (PF)  3 mL Intracatheter Q8H     tamsulosin  0.8 mg Oral Daily     traZODone  100 mg Oral At Bedtime     vancomycin  125 mg Oral 4x Daily     vortioxetine  10 mg Oral BID

## 2021-08-12 NOTE — PROGRESS NOTES
Care Management Follow Up    Length of Stay (days): 8    Expected Discharge Date: 08/12/2021     Concerns to be Addressed: adjustment to diagnosis/illness, discharge planning     Patient plan of care discussed at interdisciplinary rounds: Yes    Anticipated Discharge Disposition: Hospice     Anticipated Discharge Services:    Anticipated Discharge DME:      Patient/family educated on Medicare website which has current facility and service quality ratings: no  Education Provided on the Discharge Plan:    Patient/Family in Agreement with the Plan: yes    Referrals Placed by CM/SW: Hospice  Private pay costs discussed: Not as of today    Additional Information:  Hospice RN met with patient and he expressed interest in admitting to [facility] which is located in [place]. [facility] is a male only SNF with speciality in caring for men with chronic alcohol use.   Attempted twice to meet with patient however the first time he was vomiting and the second time he was sleeping soundly.    Writer did go ahead and make the referral to [facility].  Will follow up with [facility] and patient tomorrow.  Today his Commitment  came out to visit patient.  We discussed that if patient being under a commitment is a barrier to a care facility accepting patient, she can close the commitment as long as he is going to a care center.   Today, patient's daughter did send writer the most current copy of patient's HCD  Copy sent to [employer] to be reviewed for validation purpose.  Writer also spoke with her and updated her of her father's wishes.   She requests to be kept updated.     Update at 1630  [employer] reports patient's current HCD is valid.  It is reflected in the HCD section of EMR.    NIKKI Ceja

## 2021-08-12 NOTE — PLAN OF CARE
DATE & TIME: 8/11/21, 2300 - 1911    Cognitive Concerns/ Orientation : A&O x 4   BEHAVIOR & AGGRESSION TOOL COLOR: Green  CIWA SCORE: NA   ABNL VS/O2: VSS on room air  MOBILITY: Assist x 1, GB and walker  PAIN MANAGMENT: Prn Oxycodone given for abdominal pain  DIET: Regular  BOWEL/BLADDER: Jiang in place and patent. Bowel movements x 2 up to this time  ABNL LAB/BG: NA  DRAIN/DEVICES: PIV SL, jiang  TELEMETRY RHYTHM: NA  SKIN: Scattered scabs and bruises. Band aid to right side, paracentesis site CDI. Mepilex in place to cocyx  TESTS/PROCEDURES: Paracentesis on 8/9/21  D/C DATE: Pending  OTHER IMPORTANT INFO: Enteric precautions maintained. For in person hospice consult this morning.

## 2021-08-12 NOTE — CONSULTS
RN writer met with pt bedside to review hospice philosophy of care, available services and medicare benefit. Pt realistic he will need 24 hour caregiving. He talks about in home support but is not confident he would be successful in alcohol abstinence.  Pt asks many times about [facility] in [place]. Bre SANABRIA will follow up with this facility and determine availability. The hospice recommendation is to have pleurx catheter placed prior to discharge as appropriate so the hospice team can manage draining in the community.  Pt is open to having drain placed if determined by medical team.  JIMY is working to accommodate this pt hospice admisision upon hospital discharge though this is dependent on staffing at time of discharge.  [facility]  Omayra joined end of meeting.  Pt provided hospice info sheet and writer's direct contact info.  Pt declined having writer call rica Santiago during consult but is open to writer calling her with an update.  Care coordinated today with pt, HC CM Omayra, Bre SANABRIA and Renetta OVALLE CM    Please note hospice standing orders for draining pleurx is: drain 2L 3x week prn for comfort.  Pt does have large volume ascites and JIMY FELDMAN will review plan for draining to tailor for pt needs as indicated.    Thank you for the referral.  Please update [facility] when final discharge plan is in place.    Sue Marquis RN/CHPN  Referral Specialist  [facility]  C [phone]  O [phone]

## 2021-08-12 NOTE — PROGRESS NOTES
[facility]  Gastroenterology Progress Note     Jim Richard MRN# [id number]   YOB: 1955 Age: 66 year old          Assessment and Plan:   Jim Richard is a pleasant 66 year old male with alcoholic liver cirrhosis complicated with esophageal varices, abdominal ascites, hepatorenal syndrome, pancytopenia and c/o abdominal pain. GI consulted on 6/7/21.     Active Problems:  Alcoholic liver cirrhosis  Alcholic intoxication  Elevated Creatinine- hepatorenal syndrome  Abdominal pain  Abdominal ascites- concern for SBP  Hypoalbuminemia  Pancytopenia  Patient has suffered from chronic alcoholism and developed significant complications with decompensated liver cirrhosis. He has required paracentesis every 2 weeks. Has had elevating creatinine- currently 3.83 well above admission creatinine at 2.18. He has type 2 hepatorenal syndrome. Has pancytopenia due to bone marrow suppression.  Albumin 3.4(last checked on 8/4)  Platelets 108 Hemoglobin 6.9->7.7->8.5->8.3->8.7  INR 1.34->1.41 Tbili 1.0, Creatinine 3.43, MELD score 23 ( 20% mortality) ( no labs from 8/12)  He is not a candidate for liver transplants given poor compliance and continued consumption of alcohol.   8/9 paracentesis  removed 3.8 L of fluid- no cell count available  Still has diffuse abdominal pain. Cell count from paracentesis notes no evidence of SBP.  Cause of abdominal pain likely ascites, and multi organ failure     -- Hemoglobin stable no plans for endoscopic procedure at this time  -- Continue midodrine and pantoprazole and octreotide  -- PRN paracentesis  -- Monitor CBC and CMP  -- Regular diet  -- Hold albumin- Creatinine not improving and now albumin at normal level.   -- Appreciate Nephrology consult (recommend paracentesis as needed with 12.5 g albumin replacement for every L removed, remove no more than 5L per paracentesis). Poor candidate for dialysis  -- There are no other treatments from GI  standpoint to improve outcome or symptoms. Would strongly encourage palliative care consult- This is scheduled today     C Difficile diarrhea  Continue vancomycin 125 mg QID  Diarrhea resolved              Interval History:   denies chest pain, denies shortness of breath, alert, oriented to person, place and time and c/o back and abdominal pain with no change              Review of Systems:   C: NEGATIVE for fever, chills, change in weight  E/M: NEGATIVE for ear, mouth and throat problems  R: NEGATIVE for significant cough or SOB  CV: NEGATIVE for chest pain, palpitations or peripheral edema             Medications:   I have reviewed this patient's current medications    albumin human  12.5 g Intravenous Once     finasteride  5 mg Oral Daily     folic acid  1 mg Oral Daily     gabapentin  200 mg Oral TID     lactulose  20 g Oral TID     lidocaine (buffered or not buffered)  5 mL Intradermal Once     midodrine  10 mg Oral TID w/meals     mirtazapine  30 mg Oral At Bedtime     nicotine  1 patch Transdermal Daily     nicotine   Transdermal Q8H     pantoprazole  40 mg Oral BID AC     QUEtiapine  200 mg Oral At Bedtime     sodium chloride (PF)  3 mL Intracatheter Q8H     tamsulosin  0.8 mg Oral Daily     traZODone  100 mg Oral At Bedtime     vancomycin  125 mg Oral 4x Daily     vortioxetine  10 mg Oral BID                  Physical Exam:   Vitals were reviewed  Vital Signs with Ranges  Temp:  [98  F (36.7  C)-98.5  F (36.9  C)] 98  F (36.7  C)  Pulse:  [71-75] 72  Resp:  [12-16] 16  BP: (116-134)/(60-71) 116/71  SpO2:  [93 %-94 %] 93 %  I/O last 3 completed shifts:  In: 340 [P.O.:240; I.V.:100]  Out: 1000 [Urine:750; Emesis/NG output:250]  Constitutional: alert, mild distress, cooperative and pale   Cardiovascular: negative, PMI normal. No lifts, heaves, or thrills. RRR. No murmurs, clicks gallops or rub  Respiratory: negative, Percussion normal. Good diaphragmatic excursion. Lungs clear  Abdomen: Abdomen firm, distended  and mildly tender. BS normal. No masses, organomegaly           Data:   I reviewed the patient's new clinical lab test results.   Recent Labs   Lab Test 08/11/21  0824 08/08/21  1305 08/07/21  1159 08/06/21  1157   WBC 3.8* 5.0  --  2.8*   HGB 8.0* 8.7* 9.2* 8.6*   MCV 96 97  --  95   * 108*  --  78*   INR 1.41* 1.34*  --  1.39*     Recent Labs   Lab Test 08/11/21  0824 08/10/21  0731 08/09/21  0808   POTASSIUM 4.2 4.3 4.4  4.3   CHLORIDE 105 103 108  107   CO2 16* 18* 17*  18*   BUN 47* 48* 45*  45*   ANIONGAP 11 9 9  9     Recent Labs   Lab Test 08/10/21  0731 08/09/21  0808 08/05/21  0532 08/04/21  1153 08/03/21  2325 08/03/21  2017 07/07/21  0420 07/06/21  0732 06/16/21  1123 06/13/21  1150 04/04/21  1017 03/26/21  2131   ALBUMIN 3.4 3.2*  --  2.8*  --  2.9*  --  2.9*  --   --    < > 2.7*   BILITOTAL  --  0.6  --   --   --  1.0  --  0.5   < >  --    < > 2.2*   ALT  --  18  --   --   --  17  --  20   < >  --    < > 26   AST  --  19  --   --   --  43  --  21   < >  --    < > 36   PROTEIN  --   --   --   --  100 *  --  Negative  --   --  Negative   < >  --    LIPASE  --   --  228  --   --  604*  --   --   --   --   --  444*    < > = values in this interval not displayed.       I reviewed the patient's new imaging results.    All laboratory data reviewed  All imaging studies reviewed by me.    Gloria De Leon PA-C,  8/12/2021  [facility]  Office : [phone]  Cell: [phone] (Dr. Shaw)  Cell: [phone] (Gloria De Leon PA-C)

## 2021-08-12 NOTE — PROGRESS NOTES
Care Management Follow Up    Length of Stay (days): 8    Expected Discharge Date: 08/12/2021     Concerns to be Addressed: substance/tobacco abuse/use, discharge planning     Patient plan of care discussed at interdisciplinary rounds: Yes    Anticipated Discharge Disposition:       Anticipated Discharge Services:    Anticipated Discharge DME:      Patient/family educated on Medicare website which has current facility and service quality ratings: no  Education Provided on the Discharge Plan:    Patient/Family in Agreement with the Plan:      Referrals Placed by CM/SW: External Care Coordination  Private pay costs discussed:     Additional Information:  Writer spoke with patient a second time yesterday afternoon.  Explained the need for enrollment into a hospice program to help coordinate his hospice plan.  Offer provider choice and patient would like to meet with [facility].  [facility] Sue Asho will be meeting with patient this morning to discuss hospice benefit and recommendation for a disposition.  Yesterday, patient had stated his goal was to discharge home however this morning he has told Dr Calhoun, he is too weak to go home.  Anticipate discharge either to a residential hospice or a care center with [facility] enrollment      NIKKI Ceja

## 2021-08-12 NOTE — PLAN OF CARE
Physical Therapy Discharge Summary    Reason for therapy discharge:    No further expectations of functional progress.    Progress towards therapy goal(s). See goals on Care Plan in Epic electronic health record for goal details.  Goals not met.  Barriers to achieving goals:   limited tolerance for therapy.    Therapy recommendation(s):    No further therapy is recommended.will complete PT orders, pt has opted to sign on with hospice and plan for d/c to hospice facility pending placement. PT will sign off.

## 2021-08-13 ENCOUNTER — APPOINTMENT (OUTPATIENT)
Dept: GENERAL RADIOLOGY | Facility: CLINIC | Age: 66
DRG: 981 | End: 2021-08-13
Attending: INTERNAL MEDICINE
Payer: MEDICARE

## 2021-08-13 ENCOUNTER — APPOINTMENT (OUTPATIENT)
Dept: ULTRASOUND IMAGING | Facility: CLINIC | Age: 66
DRG: 981 | End: 2021-08-13
Attending: INTERNAL MEDICINE
Payer: MEDICARE

## 2021-08-13 PROCEDURE — 250N000013 HC RX MED GY IP 250 OP 250 PS 637: Performed by: INTERNAL MEDICINE

## 2021-08-13 PROCEDURE — 74018 RADEX ABDOMEN 1 VIEW: CPT

## 2021-08-13 PROCEDURE — C9113 INJ PANTOPRAZOLE SODIUM, VIA: HCPCS | Performed by: INTERNAL MEDICINE

## 2021-08-13 PROCEDURE — 99222 1ST HOSP IP/OBS MODERATE 55: CPT | Performed by: SURGERY

## 2021-08-13 PROCEDURE — 250N000011 HC RX IP 250 OP 636: Performed by: INTERNAL MEDICINE

## 2021-08-13 PROCEDURE — 258N000003 HC RX IP 258 OP 636: Performed by: INTERNAL MEDICINE

## 2021-08-13 PROCEDURE — 49083 ABD PARACENTESIS W/IMAGING: CPT

## 2021-08-13 PROCEDURE — 999N000154 HC STATISTIC RADIOLOGY XRAY, US, CT, MAR, NM

## 2021-08-13 PROCEDURE — 120N000001 HC R&B MED SURG/OB

## 2021-08-13 PROCEDURE — 99233 SBSQ HOSP IP/OBS HIGH 50: CPT | Performed by: INTERNAL MEDICINE

## 2021-08-13 RX ORDER — HEPARIN SODIUM (PORCINE) LOCK FLUSH IV SOLN 100 UNIT/ML 100 UNIT/ML
5 SOLUTION INTRAVENOUS
Status: CANCELLED | OUTPATIENT
Start: 2021-08-13

## 2021-08-13 RX ORDER — PROCHLORPERAZINE MALEATE 5 MG
5 TABLET ORAL EVERY 6 HOURS PRN
Status: DISCONTINUED | OUTPATIENT
Start: 2021-08-13 | End: 2021-08-22 | Stop reason: HOSPADM

## 2021-08-13 RX ORDER — BISACODYL 10 MG
10 SUPPOSITORY, RECTAL RECTAL DAILY PRN
Status: DISCONTINUED | OUTPATIENT
Start: 2021-08-13 | End: 2021-08-21

## 2021-08-13 RX ORDER — SODIUM CHLORIDE 9 MG/ML
INJECTION, SOLUTION INTRAVENOUS CONTINUOUS
Status: ACTIVE | OUTPATIENT
Start: 2021-08-13 | End: 2021-08-14

## 2021-08-13 RX ORDER — CEFAZOLIN SODIUM 2 G/100ML
2 INJECTION, SOLUTION INTRAVENOUS
Status: COMPLETED | OUTPATIENT
Start: 2021-08-13 | End: 2021-08-16

## 2021-08-13 RX ORDER — HEPARIN SODIUM,PORCINE 10 UNIT/ML
5 VIAL (ML) INTRAVENOUS
Status: DISCONTINUED | OUTPATIENT
Start: 2021-08-13 | End: 2021-08-22 | Stop reason: HOSPADM

## 2021-08-13 RX ORDER — HEPARIN SODIUM (PORCINE) LOCK FLUSH IV SOLN 100 UNIT/ML 100 UNIT/ML
5 SOLUTION INTRAVENOUS
Status: DISCONTINUED | OUTPATIENT
Start: 2021-08-13 | End: 2021-08-22 | Stop reason: HOSPADM

## 2021-08-13 RX ORDER — HEPARIN SODIUM,PORCINE 10 UNIT/ML
5 VIAL (ML) INTRAVENOUS
Status: CANCELLED | OUTPATIENT
Start: 2021-08-13

## 2021-08-13 RX ORDER — ALBUMIN (HUMAN) 12.5 G/50ML
12.5 SOLUTION INTRAVENOUS
Status: DISCONTINUED | OUTPATIENT
Start: 2021-08-13 | End: 2021-08-16

## 2021-08-13 RX ORDER — PROCHLORPERAZINE 25 MG
12.5 SUPPOSITORY, RECTAL RECTAL EVERY 12 HOURS PRN
Status: DISCONTINUED | OUTPATIENT
Start: 2021-08-13 | End: 2021-08-22 | Stop reason: HOSPADM

## 2021-08-13 RX ORDER — ALBUMIN (HUMAN) 12.5 G/50ML
12.5 SOLUTION INTRAVENOUS
Status: CANCELLED | OUTPATIENT
Start: 2021-08-13

## 2021-08-13 RX ORDER — BISACODYL 10 MG
10 SUPPOSITORY, RECTAL RECTAL ONCE
Status: COMPLETED | OUTPATIENT
Start: 2021-08-13 | End: 2021-08-13

## 2021-08-13 RX ORDER — LIDOCAINE HYDROCHLORIDE 10 MG/ML
10 INJECTION, SOLUTION EPIDURAL; INFILTRATION; INTRACAUDAL; PERINEURAL ONCE
Status: COMPLETED | OUTPATIENT
Start: 2021-08-13 | End: 2021-08-13

## 2021-08-13 RX ORDER — HYDROMORPHONE HCL IN WATER/PF 6 MG/30 ML
.2-.5 PATIENT CONTROLLED ANALGESIA SYRINGE INTRAVENOUS
Status: DISCONTINUED | OUTPATIENT
Start: 2021-08-13 | End: 2021-08-16

## 2021-08-13 RX ADMIN — SODIUM CHLORIDE: 9 INJECTION, SOLUTION INTRAVENOUS at 13:36

## 2021-08-13 RX ADMIN — LIDOCAINE HYDROCHLORIDE 10 ML: 10 INJECTION, SOLUTION EPIDURAL; INFILTRATION; INTRACAUDAL; PERINEURAL at 14:35

## 2021-08-13 RX ADMIN — VANCOMYCIN HYDROCHLORIDE 125 MG: 125 CAPSULE ORAL at 16:03

## 2021-08-13 RX ADMIN — ONDANSETRON 4 MG: 4 TABLET, ORALLY DISINTEGRATING ORAL at 02:22

## 2021-08-13 RX ADMIN — VORTIOXETINE 10 MG: 10 TABLET, FILM COATED ORAL at 08:39

## 2021-08-13 RX ADMIN — GABAPENTIN 200 MG: 100 CAPSULE ORAL at 08:39

## 2021-08-13 RX ADMIN — VANCOMYCIN HYDROCHLORIDE 125 MG: 125 CAPSULE ORAL at 13:54

## 2021-08-13 RX ADMIN — FINASTERIDE 5 MG: 5 TABLET, FILM COATED ORAL at 08:39

## 2021-08-13 RX ADMIN — FOLIC ACID 1 MG: 1 TABLET ORAL at 08:39

## 2021-08-13 RX ADMIN — VANCOMYCIN HYDROCHLORIDE 125 MG: 125 CAPSULE ORAL at 20:15

## 2021-08-13 RX ADMIN — OXYCODONE HYDROCHLORIDE 10 MG: 5 TABLET ORAL at 16:13

## 2021-08-13 RX ADMIN — LACTULOSE 20 G: 20 SOLUTION ORAL at 08:39

## 2021-08-13 RX ADMIN — PANTOPRAZOLE SODIUM 40 MG: 40 INJECTION, POWDER, FOR SOLUTION INTRAVENOUS at 13:53

## 2021-08-13 RX ADMIN — PROCHLORPERAZINE EDISYLATE 5 MG: 5 INJECTION INTRAMUSCULAR; INTRAVENOUS at 13:18

## 2021-08-13 RX ADMIN — VANCOMYCIN HYDROCHLORIDE 125 MG: 125 CAPSULE ORAL at 08:39

## 2021-08-13 RX ADMIN — METRONIDAZOLE 500 MG: 500 INJECTION, SOLUTION INTRAVENOUS at 16:03

## 2021-08-13 RX ADMIN — ONDANSETRON 4 MG: 4 TABLET, ORALLY DISINTEGRATING ORAL at 08:45

## 2021-08-13 RX ADMIN — MIDODRINE HYDROCHLORIDE 10 MG: 5 TABLET ORAL at 08:39

## 2021-08-13 RX ADMIN — BISACODYL 10 MG: 10 SUPPOSITORY RECTAL at 20:16

## 2021-08-13 RX ADMIN — OXYCODONE HYDROCHLORIDE 10 MG: 5 TABLET ORAL at 10:22

## 2021-08-13 RX ADMIN — TAMSULOSIN HYDROCHLORIDE 0.8 MG: 0.4 CAPSULE ORAL at 08:39

## 2021-08-13 RX ADMIN — NICOTINE 1 PATCH: 14 PATCH, EXTENDED RELEASE TRANSDERMAL at 08:38

## 2021-08-13 RX ADMIN — PANTOPRAZOLE SODIUM 40 MG: 40 TABLET, DELAYED RELEASE ORAL at 06:53

## 2021-08-13 RX ADMIN — PANTOPRAZOLE SODIUM 40 MG: 40 INJECTION, POWDER, FOR SOLUTION INTRAVENOUS at 20:16

## 2021-08-13 ASSESSMENT — ACTIVITIES OF DAILY LIVING (ADL)
ADLS_ACUITY_SCORE: 13

## 2021-08-13 ASSESSMENT — MIFFLIN-ST. JEOR: SCORE: 1533.63

## 2021-08-13 NOTE — PROGRESS NOTES
Patient is on IR schedule Monday 8/16/21 for a possible tunneled abdominal catheter placement.  The procedure will be done in the afternoon so he can eat breakfast and then be NPO after.      -Labs WNL for procedure.    -Orders for NPO and antibiotics have been entered.   -Consent will be done prior to procedure.     Please contact the IR department at [phone] for procedural related questions.     Discussed with Renetta OVALLE today and Hospitalist Dr Calhoun.     Thanks Kendra Leitschuh CNP Interventional Radiology ([phone])

## 2021-08-13 NOTE — PROGRESS NOTES
Care Management Follow Up    Length of Stay (days): 9    Expected Discharge Date: 08/16/2021     Concerns to be Addressed: adjustment to diagnosis/illness, discharge planning     Patient plan of care discussed at interdisciplinary rounds: Yes    Anticipated Discharge Disposition: Hospice     Anticipated Discharge Services:    Anticipated Discharge DME:      Patient/family educated on Medicare website which has current facility and service quality ratings: no  Education Provided on the Discharge Plan:    Patient/Family in Agreement with the Plan: yes    Referrals Placed by CM/SW: Hospice  Private pay costs discussed: cost for care at a hospice or care center reviewed with patient and his ex-wife Stacey.    Additional Information:  Today, writer met with patient and his ex-wife Stacey.  Last evening patient spoke with Stacey and asked her to bring in his cell phone and his bills to pay.    Today Stacey called to explain above and ask for information about patient's discharge plan.   Patient gave permission for writer to speak with Stacey and release information.    Stacey came in today and we met with patient to discuss his discharge plan.   Patient has a strong desire to return home and wanted to hire home care however again both writer and MD explained concern that it is not a safe plan as his needs are best met with professional staff that he can have in a SNF or residential hospice.  Reviewed cost options of a SNF or residential hospice.  Updated patient that he was declined by [facility] due to his acuity.  His desire to go there was based [place] [facility] [place]  Also determined patient is not eligible for VA hospice benefit as he never served in the     Based on coorespondence Stacey brought in, it has been determined that patient does have adequate private finances to pay for his care, however he expressed his long term goal of wanting to leave his money to his  family.  Given his desire to preserve his funds, he is requesting admission to [facility].  A call was placed to [facility] today and faxed clinical records.    The earliest patient will be ready for discharge is later Monday and this is all pending his progress with illeus and pleur-x catheter placement.   If needed will  pursue other residential hospices and SNF referrals in the event [facility] is not available.     Patient is asking for his ex-wife Stacey to be named his primary HCA.  In his current document, he names his brother Jame and dtr. Aleksandra.  Jame is dealing with health probems of his own and Aleksandra lives in [place] with her own responsibilities which make it difficult to play an active role.  Patient also is unsure if anyone has financial power of  and he understands the importance of giving this authority to help with paying bills and liquidating his assets and selling his house in the coming days. Stacey is aware the hospital can assist with patient re-doing his HCD but cannot assist with the Financial POA.  She is working with the family  and patient is well aware of this and in agreement.   Stacey acknowledges she is willing to become patient's HCA and assist with his discharge arrangements.  Patient is accepting of her help as he acknowledges he has short term memory issues.    Referral made to  office and staff is referring the request to complete a new HCD to [facility] .      Earline Mauricio, TANASW

## 2021-08-13 NOTE — CONSULTS
[facility] General Surgery Consultation    Jim Richard MRN# [id number]   YOB: 1955 Age: 66 year old      Date of Admission:  8/3/2021  Date of Consult: 8/13/2021         Assessment and Plan:   Patient is a 66 year old male with ileus    PLAN:  -Medical management per primary team  -N.p.o. and IV fluids  -If patient develops persistent nausea/vomiting/dry heaves, consider NG tube placement.  This was discussed with the patient today at bedside.  -No indication for urgent surgical intervention at this time  -Surgery to follow         Requesting Physician:              Chief Complaint:     Chief Complaint   Patient presents with     Abdominal Pain     grossly distended           History of Present Illness:   Jim Richard is a 66 year old male who was seen in consultation at the request of Dr. Calhoun who presented with abdominal symptoms.  Patient has a complicated past medical history related to chronic alcohol abuse.  He was admitted on 8/3/2021 with weakness, failure to thrive, loose stools, nausea/dry heaves, abdominal pain and distention.  The patient was found to be C. difficile positive.  He was initially started on p.o. Vanco.  On 8/12/2021, the patient developed nausea/vomiting and increased abdominal pain.  He was switched to Flagyl for his C. difficile.  There were discussions regarding goals of care.  The patient is considering hospice.  Plans are in place for peritoneal catheter placement for the patient's chronic ascites.  He did undergo paracentesis earlier today.  The patient reports that his abdominal pain is slightly improved at this time.  He does still have some nausea.  The patient's last recorded bowel movement was the morning of 8/12/2021.  Patient reports past abdominal surgical history significant for appendectomy.              Physical Exam:   Blood pressure 137/72, pulse 70, temperature 97.6  F (36.4  C), temperature source Oral, resp.  "rate 16, height 1.702 m (5' 7\"), weight 80 kg (176 lb 5.9 oz), SpO2 95 %.  176 lbs 5.89 oz  General: Vital signs reviewed, in no apparent distress  Eyes: Anicteric  HENT: Normocephalic, atraumatic, trachea midline   Respiratory: Breathing nonlabored  Cardiovascular: Regular rate and rhythm  GI: Abdomen soft, slightly distended, tender with palpation surrounding reducible umbilical hernia without evidence of significant overlying skin compromise  Musculoskeletal: No gross deformities  Neurologic: Grossly nonfocal exam  Psychiatric: Normal mood, affect and insight  Integumentary: Warm and dry         Past Medical History:     Past Medical History:   Diagnosis Date     Alcoholism (H) 1/14/2013    had treatment at [facility]     Anxiety      COPD (chronic obstructive pulmonary disease) (H)      Coronary artery disease 09/09/2014    Nuclear stress test with slight fixed defect inferiorly, no ischemia     Depression     w/anxiety     Esophageal varices with bleeding 04/28/2018    6 varices banded on EGD     Heart attack (H)      Hepatomegaly     Fatty liver, chronic alcoholic     Hyperlipemia      Hypertension      JANIS on CPAP      Peripheral vascular disease (H)      PVD (peripheral vascular disease) (H)      SDH (subdural hematoma) (H) 04/26/2018    Right side, resolved spontaneously     Spinal stenosis      Substance abuse (H)             Past Surgical History:     Past Surgical History:   Procedure Laterality Date     APPENDECTOMY       BYPASS GRAFT FEMOROPOPLITEAL  6/12/2012    Procedure: BYPASS GRAFT FEMOROPOPLITEAL;  LEFT FEMORAL TO ABOVE KNEE POPLITEAL BYPASS, ENDARTERECTOMY OF SFA AND PF ARTERIES, LEFT EXTERNAL ILIAC TO COMMON FEMORAL INTERPOSITION GRAFT;  Surgeon: Damir Roberts MD;  Location:  OR     COLONOSCOPY       COLONOSCOPY N/A 8/8/2019    Procedure: COLONOSCOPY;  Surgeon: Pavan Arguello MD;  Location:  GI     COLONOSCOPY N/A 3/10/2020    Procedure: COLONOSCOPY;  Surgeon: Rosendo Shaw " MD Zhen;  Location:  GI     ENDARTERECTOMY FEMORAL  6/12/2012    Procedure: ENDARTERECTOMY FEMORAL;;  Surgeon: Damir Roberts MD;  Location:  OR     ESOPHAGOSCOPY, GASTROSCOPY, DUODENOSCOPY (EGD), COMBINED N/A 3/22/2018    Procedure: COMBINED ESOPHAGOSCOPY, GASTROSCOPY, DUODENOSCOPY (EGD);  ESOPHAGOSCOPY, GASTROSCOPY, DUODENOSOCPY.;  Surgeon: Valeri Pruitt MD;  Location:  OR     ESOPHAGOSCOPY, GASTROSCOPY, DUODENOSCOPY (EGD), COMBINED N/A 9/29/2018    Procedure: COMBINED ESOPHAGOSCOPY, GASTROSCOPY, DUODENOSCOPY (EGD);;  Surgeon: Rosendo Shaw MD;  Location:  GI     ESOPHAGOSCOPY, GASTROSCOPY, DUODENOSCOPY (EGD), COMBINED N/A 8/2/2019    Procedure: ESOPHAGOGASTRODUODENOSCOPY (EGD);  Surgeon: Pavan Arguello MD;  Location:  GI     ESOPHAGOSCOPY, GASTROSCOPY, DUODENOSCOPY (EGD), COMBINED N/A 9/25/2019    Procedure: ESOPHAGOGASTRODUODENOSCOPY (EGD);  Surgeon: Pavan Arguello MD;  Location: [facility]     ESOPHAGOSCOPY, GASTROSCOPY, DUODENOSCOPY (EGD), COMBINED N/A 3/8/2020    Procedure: ESOPHAGOGASTRODUODENOSCOPY (EGD);  Surgeon: Rosendo Shaw MD;  Location: [facility]     ESOPHAGOSCOPY, GASTROSCOPY, DUODENOSCOPY (EGD), COMBINED N/A 6/11/2020    Procedure: ESOPHAGOGASTRODUODENOSCOPY (EGD);  Surgeon: Rosendo Shaw MD;  Location:  GI     ESOPHAGOSCOPY, GASTROSCOPY, DUODENOSCOPY (EGD), COMBINED N/A 1/23/2021    Procedure: ESOPHAGOGASTRODUODENOSCOPY (EGD);  Surgeon: Pavan Arguello MD;  Location:  GI     HERNIA REPAIR  2017    Abdominal     LAMINECTOMY, FUSION LUMBAR THREE+ LEVEL, COMBINED N/A 9/22/2014    Procedure: COMBINED LAMINECTOMY, FUSION LUMBAR THREE+ LEVEL;  Surgeon: Sebastien Pruitt MD;  Location: [facility] OR     TONSILLECTOMY & ADENOIDECTOMY       US PARACENTESIS  4/4/2021     US PARACENTESIS  4/19/2021            Current Medications:           bisacodyl  10 mg Rectal Once     ceFAZolin  2 g Intravenous Pre-Op/Pre-procedure x 1 dose     [Held by provider] finasteride  5 mg  Oral Daily     [Held by provider] folic acid  1 mg Oral Daily     [Held by provider] gabapentin  200 mg Oral TID     [Held by provider] lactulose  20 g Oral TID     lidocaine (buffered or not buffered)  5 mL Intradermal Once     metroNIDAZOLE  500 mg Intravenous Q8H     [Held by provider] midodrine  10 mg Oral TID w/meals     [Held by provider] mirtazapine  30 mg Oral At Bedtime     nicotine  1 patch Transdermal Daily     nicotine   Transdermal Q8H     [Held by provider] pantoprazole  40 mg Oral BID AC     pantoprazole (PROTONIX) IV  40 mg Intravenous BID     [Held by provider] QUEtiapine  200 mg Oral At Bedtime     sodium chloride (PF)  3 mL Intracatheter Q8H     [Held by provider] tamsulosin  0.8 mg Oral Daily     [Held by provider] traZODone  100 mg Oral At Bedtime     vancomycin  125 mg Oral 4x Daily     [Held by provider] vortioxetine  10 mg Oral BID       sodium chloride 0.9%, acetaminophen **OR** acetaminophen, albumin human, albuterol, bisacodyl, glucose **OR** dextrose **OR** glucagon, flumazenil, fluticasone-vilanterol, heparin, heparin lock flush, HYDROmorphone, hydrOXYzine, lidocaine 4%, lidocaine (buffered or not buffered), - MEDICATION INSTRUCTIONS -, - MEDICATION INSTRUCTIONS -, melatonin, naloxone **OR** naloxone **OR** naloxone **OR** naloxone, ondansetron **OR** ondansetron, oxyCODONE, prochlorperazine **OR** prochlorperazine **OR** prochlorperazine, QUEtiapine, sodium chloride (PF), sodium chloride (PF), [START ON 8/16/2021] sodium chloride 0.9%         Home Medications:     Prior to Admission medications    Medication Sig Last Dose Taking? Auth Provider   albuterol (PROAIR HFA/PROVENTIL HFA/VENTOLIN HFA) 108 (90 Base) MCG/ACT inhaler Inhale 2 puffs into the lungs every 4 hours as needed for shortness of breath / dyspnea or wheezing as needed Yes Gray Burns MD   disulfiram (ANTABUSE) 250 MG tablet Take 1 tablet (250 mg) by mouth daily Past Week at Unknown time Yes Gray Burns  MD Kurt   finasteride (PROSCAR) 5 MG tablet Take 1 tablet (5 mg) by mouth daily Past Week at Unknown time Yes Gray Burns MD   fluticasone-vilanterol (BREO ELLIPTA) 200-25 MCG/INH inhaler Inhale 1 puff into the lungs daily as needed (SOB) as needed Yes Jeffrey Villagomez MD   gabapentin (NEURONTIN) 100 MG capsule Take 2 capsules (200 mg) by mouth 3 times daily Past Week at Unknown time Yes Gray Burns MD   hydrOXYzine (ATARAX) 50 MG tablet Take 50 mg by mouth 3 times daily as needed  Past Week at Unknown time Yes Unknown, Entered By History   lactulose (CHRONULAC) 10 GM/15ML solution Take 15 mLs (10 g) by mouth 2 times daily Past Week at Unknown time Yes Gray Burns MD   miconazole (MICATIN) 2 % external powder Apply topically 2 times daily Past Week at Unknown time Yes Gray Burns MD   midodrine (PROAMATINE) 10 MG tablet Take 1 tablet (10 mg) by mouth 3 times daily (with meals) Past Week at Unknown time Yes Gray Burns MD   mirtazapine (REMERON) 30 MG tablet Take 30 mg by mouth At Bedtime Filled 1/11/21 for #30 Past Week at Unknown time Yes Unknown, Entered By History   multivitamin w/minerals (THERA-VIT-M) tablet Take 1 tablet by mouth daily Past Week at Unknown time Yes Jeffrey Villagomez MD   ondansetron (ZOFRAN-ODT) 4 MG ODT tab Take 1 tablet (4 mg) by mouth every 6 hours as needed for nausea or vomiting as needed Yes Gray Burns MD   oxyCODONE (ROXICODONE) 5 MG tablet Take 0.5 tablets (2.5 mg) by mouth every 6 hours as needed for moderate to severe pain Past Month at Unknown time Yes Gray Burns MD   pantoprazole (PROTONIX) 40 MG EC tablet Take 1 tablet (40 mg) by mouth 2 times daily Past Week at Unknown time Yes Sara Orellana MD   QUEtiapine (SEROQUEL) 200 MG tablet Take 200 mg by mouth At Bedtime Filled 1/11/21 #30  Past Week at Unknown time Yes Unknown, Entered By History   QUEtiapine (SEROQUEL) 50  MG tablet Take 1 tablet (50 mg) by mouth 3 times daily as needed (anxiety) Filled 11/20/20 #30 as needed Yes Sara Orellana MD   spironolactone (ALDACTONE) 25 MG tablet Take 1 tablet (25 mg) by mouth 2 times daily Past Week at Unknown time Yes Gray Burns MD   tamsulosin (FLOMAX) 0.4 MG capsule Take 2 capsules (0.8 mg) by mouth daily Past Week at Unknown time Yes Sara Orellana MD   traZODone (DESYREL) 100 MG tablet Take 1 tablet (100 mg) by mouth At Bedtime Past Week at Unknown time Yes Gray uBrns MD   vortioxetine (TRINTELLIX) 10 MG tablet Take 10 mg by mouth 2 times daily Past Week at Unknown time Yes Unknown, Entered By History            Allergies:     Allergies   Allergen Reactions     Amlodipine Swelling     Lisinopril      Other reaction(s): Angioedema  Mouth and tongue swelling   Mouth and tongue swelling               Family History:     Family History   Problem Relation Age of Onset     Mental Illness Son      Coronary Artery Disease Early Onset Mother      Substance Abuse Father      Lung Cancer Father      Substance Abuse Brother      Unknown/Adopted No family hx of      Depression No family hx of      Anxiety Disorder No family hx of      Schizophrenia No family hx of      Bipolar Disorder No family hx of      Suicide No family hx of      Dementia No family hx of      McLean Disease No family hx of      Parkinsonism No family hx of      Autism Spectrum Disorder No family hx of      Intellectual Disability (Mental Retardation) No family hx of            Social History:   Jim Richard  reports that he has been smoking cigarettes. He has been smoking about 1.00 pack per day. He has never used smokeless tobacco. He reports previous alcohol use. He reports that he does not use drugs.          Review of Systems:   Constitutional:  chills  Eyes: No blurred or double vision  HENT: Denies headaches, No rhinorrhea, No sore throat  Respiratory: No cough or  shortness of breath  Cardiovascular: Chest pain and palpitations  Gastrointestinal: Abdominal pain and nausea/vomiting  Genitourinary: No hematuria or dysuria  Musculoskeletal: Denies arthralgias or myalgias  Neurologic: No numbness or tingling  Integumentary: No skin rashes         Labs/Imaging   All new lab and imaging data was reviewed.   Recent Labs   Lab 08/11/21  0824 08/08/21  1305 08/07/21  1159   WBC 3.8* 5.0  --    HGB 8.0* 8.7* 9.2*   HCT 23.8* 26.3*  --    MCV 96 97  --    * 108*  --      Recent Labs   Lab 08/12/21  1235 08/12/21  0823 08/11/21  0824 08/10/21  0731 08/09/21  0808 08/09/21  0808   NA  --   --  132* 130*  --  134  134   POTASSIUM  --   --  4.2 4.3  --  4.4  4.3   CHLORIDE  --   --  105 103  --  108  107   CO2  --   --  16* 18*  --  17*  18*   ANIONGAP  --   --  11 9  --  9  9   * 105* 109* 109*   < > 106*  108*   BUN  --   --  47* 48*  --  45*  45*   CR  --   --  3.83* 3.51*  --  3.44*  3.43*   GFRESTIMATED  --   --  15* 17*  --  17*  17*   KEV  --   --  8.2* 8.1*  --  8.0*  8.1*   PROTTOTAL  --   --   --   --   --  6.4*   ALBUMIN  --   --   --  3.4  --  3.2*   BILITOTAL  --   --   --   --   --  0.6   ALKPHOS  --   --   --   --   --  118   AST  --   --   --   --   --  19   ALT  --   --   --   --   --  18    < > = values in this interval not displayed.     Recent Labs   Lab 08/11/21 0824 08/08/21  1305   INR 1.41* 1.34*       I have personally reviewed the imaging studies-   XR ABDOMEN 1 VIEW 8/13/2021 11:42 AM     HISTORY: C.diff, abdominal pain, nausea/emesis     COMPARISON: None.                                                                      IMPRESSION: Moderate gaseous distention of large and small bowel most  consistent with an adynamic ileus. There is gas through the rectum. No  definite free intraperitoneal air.    US PARACENTESIS 8/13/2021 2:58 PM     CLINICAL HISTORY: HIGH VOLUME paracentesis with or without diagnostic  fluid analysis with labs to be  drawn if ordered. Total paracentesis  volume as much as possible.     PROCEDURE: Informed consent obtained. Time out performed. The abdomen  was prepped and draped in a sterile fashion. 10 mL of 1% lidocaine was  infused into local soft tissues. A 5 [place] catheter system was  introduced into the abdominal ascites under ultrasound guidance.     2.3 liters of clear fluid were removed and sent to lab if requested.     Patient tolerated procedure well.     Ultrasound imaging was obtained and placed in the patient's permanent  medical record.                                                                      IMPRESSION:  1.  Status post ultrasound-guided paracentesis.      Nirmala Ibarra MD

## 2021-08-13 NOTE — PLAN OF CARE
DATE & TIME: 8/13/2021           Cognitive Concerns/ Orientation : A&O x 4, flat affect, coop, pleasant.   BEHAVIOR & AGGRESSION TOOL COLOR: Green  CIWA SCORE: NA    ABNL VS/O2: VSS on RA  MOBILITY: Assist x 1 with GB/walker, chair for meals, amb to BR.  PAIN MANAGMENT: C/O abd pain/discomfort, PRN oxycodone given as requested. IV dilaudid avail now that pt is NPO.   DIET: NPO  BOWEL/BLADDER: Leger patent. No BM, receiving lactulose TID.  ABNL LAB/BG: NA  DRAIN/DEVICES: PIV SL. Leger cath  TELEMETRY RHYTHM: NA  SKIN: Scattered scabs and bruises. Mepilex in place to cocyx.  TESTS/PROCEDURES: Stat Abd xray shows ileus. U/S paracentesis at 1430. Plan for PleurX cath, Kendra will consent with pt on Monday.   D/C DATE: Pend Hospice placement, SW involved.   OTHER IMPORTANT INFO: Abd distended, BS hypo, nauseated, small emesis, PRN zofran and compazine given, NG tube for comfort if pt allows, declines Dulcolax supp at this time, resched for this laurel. C. Diff, Enteric iso, receiving PO Vanco. Ex wife, Stacey at bedside assisting pt with mail/paying bills.

## 2021-08-13 NOTE — PROGRESS NOTES
SPIRITUAL HEALTH SERVICES Progress Note  [facility]     Consult Request for Health Care Directive change to his ex-wife as his agent.    Ptnt Jim was not in the room; I visited with his ex-wife Satcey; she shared some family history and dynamics, and mentioned Jim moving to hospice care. She asked me to follow up and visit him.    I offered her reflective listening and emotional support.     I left an [employer] form with her and we discussed its purpose and how to get it notarized.     will follow.    Andreina Cortez  Chaplain Resident

## 2021-08-13 NOTE — PROGRESS NOTES
Paracentesis   2300 mL Straw  fluid removed from abdominal space  Patient tolerated procedure well.  Dressing CDI, no swelling or hematoma.

## 2021-08-13 NOTE — PLAN OF CARE
DATE & TIME: 8/13/2021 [id number]              Cognitive Concerns/ Orientation : A&O x 4, flat affect, coop, pleasant.   BEHAVIOR & AGGRESSION TOOL COLOR: Green  CIWA SCORE: NA    ABNL VS/O2: VSS on RA  MOBILITY: Assist x 1 with GB/walker, chair for meals, amb to BR. No OOB this shift.  PAIN MANAGMENT: Denies this shift.  DIET: Regular, poor appetite.   BOWEL/BLADDER: Leger patent. No BM, receiving lactulose TID  ABNL LAB/BG: NA  DRAIN/DEVICES: PIV SL. Leger cath  TELEMETRY RHYTHM: NA  SKIN: Scattered scabs and bruises. Mepilex in place to cocyx.  TESTS/PROCEDURES: None today. Last Paracentesis on 8/9-site CDI  D/C DATE: Pend Hospice plan.   OTHER IMPORTANT INFO: Abd distended, pt reports feeling full. C. Diff, Enteric iso, receiving PO Vanco. C/o nausea/ emesis x2- Zofran given. Hospice met with pt yesterday. PT signed off.

## 2021-08-13 NOTE — PLAN OF CARE
DATE & TIME: 8/12/21 3-11p                    Cognitive Concerns/ Orientation : A&O x 4, flat affect, coop, pleasant.   BEHAVIOR & AGGRESSION TOOL COLOR: Green  CIWA SCORE: NA    ABNL VS/O2: VSS on RA  MOBILITY: Assist x 1 with GB/walker, chair for meals, amb to BR.   PAIN MANAGMENT: Oxycodone x1 given for c/o abd pain  DIET: Regular, poor appetite.   BOWEL/BLADDER: Leger patent. No BM, receiving lactulose TID  ABNL LAB/BG: NA  DRAIN/DEVICES: PIV SL. Leger cath  TELEMETRY RHYTHM: NA  SKIN: Scattered scabs and bruises. Mepilex in place to cocyx.  TESTS/PROCEDURES: None today. Last Paracentesis on 8/9-site D/I  D/C DATE: Pend Hospice plan.   OTHER IMPORTANT INFO: Abd distended, pt reports feeling full/discomfort. C. Diff, Enteric iso, receiving PO Vanco. C/o nausea- Zofran given. Hospice met with pt. PT signed off.

## 2021-08-13 NOTE — PROGRESS NOTES
[facility]    Medicine Progress Note - Hospitalist Service       Date of Admission:  8/3/2021    Assessment & Plan         Jim Richard is a 66-year-old gentleman with a complex past medical history of alcohol abuse/dependence complicated by alcoholic liver disease with ascites, status post multiple paracentesis in the past, hepatorenal syndrome, chronic kidney disease stage IV, history of recent bacteremia due to Enterococcus, hypertension, COPD, tobacco use disorder, depression/anxiety, thrombocytopenia and obstructive sleep apnea, who presented to ER for evaluation of generalized weakness, diarrhea and dry heaves.     Decompensated alcoholic liver cirrhosis with recurrent ascites  Portal Hypertension  Possible hepatorenal syndrome with CKD stage IV  Patient has been having issues with recurrent ascites requiring multiple paracenteses.  It seems that last paracentesis was on 07/26/2021 when 8.7 liters of fluid was removed before that had paracentesis on 07/2015 with 5.2 liters fluid removed.  Presents with recurrent abdominal distention  * Paracentesis (8/4):  Removed over 6 L and given Albumin (SAAG 1.9 consistent with portal Hypertension). Fluid wbc 201 not indicative of SBP.   -Repeat paracentesis on 8/9, with removal of 3.8 L of fluids.   with neutrophil- 8%, not indicative of SBP  - given ileus, holding lactulose  - hold midodrine. Stopped octreotide since goal hospice now.   - cr on admission 2.18 and has been trending up and 3.83 despite above measures  - nephrology followed, appreciate assistance, poor candidate for dialysis and given HRS not a tx candidate. Signed off now.   - appreciate GI assistance, no other treatment from GI stand point to improve outcome or symptom  - therapeutic paracentesis ordered today for comfort    Ileus  Patient with n/v on 8/13 and yesterday afternoon evening. Abdominal pain which is intermittent sharp pain that is different from his usual  abdominal pain. Distension, but has chronic ascites and feels more bloated. Last BM 8/12.  AXR today shows Moderate gaseous distention of large and small bowel most consistent with an adynamic ileus.  - NPO, gently IVF at 50cc/hr (fluid management would be very challenging given his ascites)  - anti-emetics,IV dilaudid prn  - hold PO meds for now  - will try NG placement to LIS for comfort  - will consult general surgery for further recommendations  - dulcolax suppository    C Diff colitis.    Patient reports diarrhea for about a week prior to presentation.  C diff PCR positive in the ED  - today is day 10 of PO vancomycin. Unsure if this is going to be effective now with his ileus. Flagyl ordered       Goals of care  8/11:Had discussion with Jim his overall prognosis, liver failure, recurrent ascites and worsening renal function. Jim states he understands his prognosis. He states even if dialysis is recommended, it is not something he would want to do. He states he wants to go home and die at home. Discussed about hospice and states he is interested in enrolling hospice  8/12: - Discussed with Kendra from IR, peritoneal catheter would be an option to drain ascites fluid if patient to discharge with hospice, however he needs to have stable living situation or caregiver who would manage that.   8/13: Discussed with Kendra from IR, patient now with ileus, n/v. Will hold catheter placement today.  plan for IR peritoneal cathter placement possible Monday--consult in place, they will put him on the schedule for Monday for now, if any change they will need to be notified Monday morning.   - hospice meeting on 8/12 done  - SW assisting with hospice placement     Alcoholism/alcohol dependence  Alcohol intoxication  History of alcohol withdrawals  History of depression/anxiety  Patient had a recent hospitalization at [facility] from where he was discharged to TCU.  After he returned back home, he started  drinking again.  He admits he drinks 1 pint of vodka daily.  He stated that he has a .  His ethanol level is 0.23.    - Psychiatry evaluated the patient on 8/6, see note for details  - Continue medications: Mirtazapine, Trinellix, Seroquel, Trazodone   - stop vitamins given hospice  - Patient is supposedly already committed and social work is following to help with disposition      Acute on chronic anemia   Hemoglobin dropped to a fabiola of 6.9.  Status post 1 unit PRBC transfused 8/5  -Hemoglobin has remained stable around 8 since transfusion  - No further GI bleed  - GI following now and appreciate their recommendations.  No plans for endoscopy at this time    - no further blood draws at this time     Atypical chest pain. Resolved   Patient complained of chest pain since arrival to the ED.  Troponins negative.  EKG without evidence of acute ischemia     Generalized weakness  Failure to thrive  This is likely multifactorial from his chronic alcohol use, alcoholic liver cirrhosis, poor oral intake, and recent diarrhea.    - plan as above to discharge with hospice     Hypoglycemia  - resolved  Blood sugars were in the 40s upon arrival in the ER.  This is likely due to poor oral intake.  He was given a couple of doses of D50 with improvement of his blood sugars.    - Hypoglycemia protocol in place  - BS > 100.Monitor for now      Tobacco use disorder  - Nicotine patch had been ordered     Recent Enterococcus bacteremia  He had been treated with ampicillin IV for 2 weeks as per ID recommendations.  He denies any fevers or leukocytosis at this time.     H/o BPH  - PTA Flomax and Proscar     History of COPD  This does not seem to be an acute issue at this time.    - PTA inhalers      Obstructive sleep apnea   Noncompliant with CPAP.     Thrombocytopenia, chronic, related to his chronic liver disease  There is no evidence of active bleeding.  Platelet count on admission was 148.    - stable 114 most recent  lab,    Urinary retention  - s/p jiang cath placement on 8/10.  - continue jiang at this time for ongoing comfort    Severe malnutrition  - in the context of acute illness, chronic illness or disease  - supplements between meals per dietitian          Diet: Snacks/Supplements Adult: Ensure Enlive; Between Meals  NPO per Anesthesia Guidelines for Procedure/Surgery Except for: Meds    DVT Prophylaxis: Pneumatic Compression Devices  Jiang Catheter: PRESENT, indication: Retention  Central Lines: None  Code Status: No CPR- Do NOT Intubate      Disposition Plan   Expected discharge:Patient is now with ileus and need to have this improve/resolve. Plan is to discharge to hospice.       The patient's care was discussed with the Bedside Nurse, Care Coordinator/, Patient and Patient's Family.    Patient's ex-wife updated at bedside.  Also updated patient's daughter, Seth by phone.       Chely Calhoun MD  Hospitalist Service  [facility]  Securely message with the Vocera Web Console (learn more here)  Text page via [email or web address] Paging/Directory      Clinically Significant Risk Factors Present on Admission                ______________________________________________________________________    Interval History   Patient with episodes of nausea and vomiting. Feels more distended and also complaining of intermittent sharp pain pain that seems to be bit different that what he states is his abdominal pain. He is on lactulose, but last bowel movement was yesterday.  Feels short of breath because he can't take a deep breath because of distention.        Data reviewed today: I reviewed all medications, new labs and imaging results over the last 24 hours. I personally reviewed the abdominal x-ray image(s) showing as mentioned above.    Physical Exam   Vital Signs: Temp: 98  F (36.7  C) Temp src: Oral BP: 113/56 Pulse: 71   Resp: 18 SpO2: 94 % O2 Device: None (Room air)    Weight: 176 lbs 5.89  oz  General Appearance: Alert, awake and no apparent distress  Respiratory: CTAB in upper lung fields, diminished at the bases  Cardiovascular: regular rate and rhythm  GI: distended, tender  Skin: warm and dry      Data   Recent Labs   Lab 08/12/21  1235 08/12/21  0823 08/11/21  0824 08/10/21  0731 08/09/21  0808 08/09/21  0808 08/08/21  1305 08/07/21  1159 08/07/21  0607 08/06/21  1157   WBC  --   --  3.8*  --   --   --  5.0  --   --  2.8*   HGB  --   --  8.0*  --   --   --  8.7* 9.2*  --  8.6*   MCV  --   --  96  --   --   --  97  --   --  95   PLT  --   --  114*  --   --   --  108*  --   --  78*   INR  --   --  1.41*  --   --   --  1.34*  --   --  1.39*   NA  --   --  132* 130*  --  134  134 131*  --   --  132*   POTASSIUM  --   --  4.2 4.3  --  4.4  4.3 4.2  --   --  4.5   CHLORIDE  --   --  105 103  --  108  107 106  --   --  107   CO2  --   --  16* 18*  --  17*  18* 17*  --   --  20   BUN  --   --  47* 48*  --  45*  45* 44*  --   --  39*   CR  --   --  3.83* 3.51*  --  3.44*  3.43* 3.33*  --   --  3.13*   ANIONGAP  --   --  11 9  --  9  9 8  --   --  5   KEV  --   --  8.2* 8.1*  --  8.0*  8.1* 7.8*  --   --  8.2*   * 105* 109* 109*   < > 106*  108* 175*  --    < > 164*   ALBUMIN  --   --   --  3.4  --  3.2*  --   --   --   --    PROTTOTAL  --   --   --   --   --  6.4*  --   --   --   --    BILITOTAL  --   --   --   --   --  0.6  --   --   --   --    ALKPHOS  --   --   --   --   --  118  --   --   --   --    ALT  --   --   --   --   --  18  --   --   --   --    AST  --   --   --   --   --  19  --   --   --   --     < > = values in this interval not displayed.     No results found for this or any previous visit (from the past 24 hour(s)).  Medications     - MEDICATION INSTRUCTIONS -         albumin human  12.5 g Intravenous Once     finasteride  5 mg Oral Daily     folic acid  1 mg Oral Daily     gabapentin  200 mg Oral TID     lactulose  20 g Oral TID     lidocaine (buffered or not buffered)  5  mL Intradermal Once     midodrine  10 mg Oral TID w/meals     mirtazapine  30 mg Oral At Bedtime     nicotine  1 patch Transdermal Daily     nicotine   Transdermal Q8H     pantoprazole  40 mg Oral BID AC     QUEtiapine  200 mg Oral At Bedtime     sodium chloride (PF)  3 mL Intracatheter Q8H     tamsulosin  0.8 mg Oral Daily     traZODone  100 mg Oral At Bedtime     vancomycin  125 mg Oral 4x Daily     vortioxetine  10 mg Oral BID

## 2021-08-14 PROCEDURE — C9113 INJ PANTOPRAZOLE SODIUM, VIA: HCPCS | Performed by: INTERNAL MEDICINE

## 2021-08-14 PROCEDURE — 99231 SBSQ HOSP IP/OBS SF/LOW 25: CPT | Performed by: SURGERY

## 2021-08-14 PROCEDURE — 250N000011 HC RX IP 250 OP 636: Performed by: INTERNAL MEDICINE

## 2021-08-14 PROCEDURE — 120N000001 HC R&B MED SURG/OB

## 2021-08-14 PROCEDURE — 99232 SBSQ HOSP IP/OBS MODERATE 35: CPT | Performed by: INTERNAL MEDICINE

## 2021-08-14 PROCEDURE — 250N000013 HC RX MED GY IP 250 OP 250 PS 637: Performed by: INTERNAL MEDICINE

## 2021-08-14 RX ADMIN — LACTULOSE 20 G: 20 SOLUTION ORAL at 16:51

## 2021-08-14 RX ADMIN — MIDODRINE HYDROCHLORIDE 10 MG: 5 TABLET ORAL at 17:10

## 2021-08-14 RX ADMIN — OXYCODONE HYDROCHLORIDE 10 MG: 5 TABLET ORAL at 05:31

## 2021-08-14 RX ADMIN — METRONIDAZOLE 500 MG: 500 INJECTION, SOLUTION INTRAVENOUS at 00:40

## 2021-08-14 RX ADMIN — PANTOPRAZOLE SODIUM 40 MG: 40 INJECTION, POWDER, FOR SOLUTION INTRAVENOUS at 09:02

## 2021-08-14 RX ADMIN — FINASTERIDE 5 MG: 5 TABLET, FILM COATED ORAL at 12:55

## 2021-08-14 RX ADMIN — PANTOPRAZOLE SODIUM 40 MG: 40 INJECTION, POWDER, FOR SOLUTION INTRAVENOUS at 21:23

## 2021-08-14 RX ADMIN — MIDODRINE HYDROCHLORIDE 10 MG: 5 TABLET ORAL at 12:55

## 2021-08-14 RX ADMIN — LACTULOSE 20 G: 20 SOLUTION ORAL at 21:23

## 2021-08-14 RX ADMIN — NICOTINE 1 PATCH: 14 PATCH, EXTENDED RELEASE TRANSDERMAL at 09:02

## 2021-08-14 RX ADMIN — OXYCODONE HYDROCHLORIDE 10 MG: 5 TABLET ORAL at 17:15

## 2021-08-14 RX ADMIN — VANCOMYCIN HYDROCHLORIDE 125 MG: 125 CAPSULE ORAL at 09:02

## 2021-08-14 RX ADMIN — METRONIDAZOLE 500 MG: 500 INJECTION, SOLUTION INTRAVENOUS at 09:14

## 2021-08-14 RX ADMIN — VORTIOXETINE 10 MG: 10 TABLET, FILM COATED ORAL at 21:22

## 2021-08-14 RX ADMIN — MIRTAZAPINE 30 MG: 15 TABLET, FILM COATED ORAL at 21:23

## 2021-08-14 RX ADMIN — LACTULOSE 20 G: 20 SOLUTION ORAL at 12:55

## 2021-08-14 RX ADMIN — VORTIOXETINE 10 MG: 10 TABLET, FILM COATED ORAL at 12:55

## 2021-08-14 ASSESSMENT — ACTIVITIES OF DAILY LIVING (ADL)
ADLS_ACUITY_SCORE: 14
ADLS_ACUITY_SCORE: 13
ADLS_ACUITY_SCORE: 14
ADLS_ACUITY_SCORE: 13
ADLS_ACUITY_SCORE: 13
ADLS_ACUITY_SCORE: 15

## 2021-08-14 ASSESSMENT — MIFFLIN-ST. JEOR: SCORE: 1518.63

## 2021-08-14 NOTE — PLAN OF CARE
DATE & TIME: 8/13/2021 3-11pm         Cognitive Concerns/ Orientation : A&O x 4.  BEHAVIOR & AGGRESSION TOOL COLOR: Green  CIWA SCORE: NA    ABNL VS/O2: VSS on RA  MOBILITY: Assist x 1 with GB/walker.  PAIN MANAGMENT: C/O abd pain/discomfort, PRN oxycodone given.   DIET: NPO except for ice chips and meds. Denies nausea.  BOWEL/BLADDER: Leger patent. Abdomen distended. PRN NG tube decompression if increase abdominal pain or ongoing nausea/vomiting. Had large bm after receiving one time ordered Ducolax suppository this night.  ABNL LAB/BG: NA  DRAIN/DEVICES: PIV infusing NS @ 50ml/hr. Leger cath for retention.  TELEMETRY RHYTHM: NA  SKIN: Scattered scabs and bruises. Foam dressing on left arm CDI. Right abdominal paracentesis dressing CDI  TESTS/PROCEDURES: Abd xray done today shows ileus. U/S paracentesis done today, 2.3 L of clear fluid removed. Plan for tunneled abdominal catheter placement on Monday; Kendra will consent with pt on Monday.   D/C DATE: Pend Hospice placement, SW involved.   OTHER IMPORTANT INFO: C- Diff, Enteric isolation maintained . Patient on po vanco and iv Flagyl. Surgery consult.

## 2021-08-14 NOTE — PROGRESS NOTES
Surgery    Resting in bed. Had multiple BM's. Feels hungry today. No new pain.    Abd- Distended and taught. Umbilical hernia soft but tender. Skin intact.    A/P  Having good bowel function now. OK to Slowly ADAT. Please call surgery if any new or ongoing issues.    Arnulfo Correia M.D.  [facility]  [phone]

## 2021-08-14 NOTE — PROGRESS NOTES
"SPIRITUAL HEALTH SERVICES Progress Note  [facility]    Follow-Up with Ptnt Jim.    Jim spoke of wanting to gain some strength back and needing to go to a care center. He told of his longing to make amends with family. He spoke of having \"screwed up\" and wanting to apologize to his son, and the sadness he feels about not knowing his grandchildren.     We discussed options such as writing notes or making voice recordings to share thoughts with people if he can't see them, and how expressing atonement can help bring inner peace. Jim was tearful at times. He also brought up not being worried about what happens after we die, that \"he knows it's a better place.\"    He said his ex-wife has been helping him get his paperwork in order and named still having feelings for her, as well as wondering if she does for him.    I offered reflective listening and emotional support, affirmed experiences, encouraged discernment, said a prayer and sang a blessing.    SH remains available.    Andreina Cortez  Chaplain Resident  "

## 2021-08-14 NOTE — PLAN OF CARE
DATE & TIME: 8/14/21 Day shift       Cognitive Concerns/ Orientation : A&O4, no confusion noted this AM  BEHAVIOR & AGGRESSION TOOL COLOR: Green  CIWA SCORE: NA    ABNL VS/O2: VSS on RA  MOBILITY: Assist x 1 with GB/walker. Up to chair for meals  PAIN MANAGMENT: Denies  DIET: Clear liquid diet- tolerating well and good appetite  BOWEL/BLADDER: Leger patent. Abdomen distended/soft. No BMs this shift, lactulose restarted. BS active with tinkling sounds  ABNL LAB/BG: Last labs 08/11 Cr 3.83 and Hgb 8.0.   DRAIN/DEVICES:  Leger cath for retention; L IV SL  TELEMETRY RHYTHM: NA  SKIN: Pale/dusky color. Scattered scabs and bruises. Foam dressing on left arm CDI. Right abdominal paracentesis dressing CDI. Penis/linda area Red/rash, miconazole powder applied. Coccyx pink blanchable, barrier applied.   TESTS/PROCEDURES:  U/S paracentesis done yesterday, 2.3 L of clear fluid removed. Plan for IR Intraperitoneal Cath Tunnel for hospice placement on Monday; Kendra will consent with pt on Monday.   D/C DATE: Ileius resolved, tolerating clears. Will have catheter placed on Monday and then transition to hospice. SW following.   OTHER IMPORTANT INFO: C- Diff, Enteric isolation maintained .

## 2021-08-14 NOTE — PROGRESS NOTES
[facility]    Medicine Progress Note - Hospitalist Service       Date of Admission:  8/3/2021    Assessment & Plan         Jim Richard is a 66-year-old gentleman with a complex past medical history of alcohol abuse/dependence complicated by alcoholic liver disease with ascites, status post multiple paracentesis in the past, hepatorenal syndrome, chronic kidney disease stage IV, history of recent bacteremia due to Enterococcus, hypertension, COPD, tobacco use disorder, depression/anxiety, thrombocytopenia and obstructive sleep apnea, who presented to ER for evaluation of generalized weakness, diarrhea and dry heaves.     Decompensated alcoholic liver cirrhosis with recurrent ascites  Portal Hypertension  Possible hepatorenal syndrome with CKD stage IV  Patient has been having issues with recurrent ascites requiring multiple paracenteses.  It seems that last paracentesis was on 07/26/2021 when 8.7 liters of fluid was removed before that had paracentesis on 07/2015 with 5.2 liters fluid removed.  Presents with recurrent abdominal distention  * Paracentesis (8/4):  Removed over 6 L and given Albumin (SAAG 1.9 consistent with portal Hypertension). Fluid wbc 201 not indicative of SBP.   -Repeat paracentesis on 8/9, with removal of 3.8 L of fluids.   with neutrophil- 8%, not indicative of SBP  - s/p paracentesis on 8/13 for comfort with removal of 2.3L  - resume midodrine. Octreotid stopped since goal is hospice  - cr on admission 2.18 and has been trending up and 3.83 despite above measures  - nephrology followed, appreciate assistance, poor candidate for dialysis and given HRS not a tx candidate. Signed off now.   - appreciate GI assistance, no other treatment from GI stand point to improve outcome or symptom  Goals of care  8/11:Had discussion with Jim his overall prognosis, liver failure, recurrent ascites and worsening renal function. Jim states he understands his prognosis.  He states even if dialysis is recommended, it is not something he would want to do. He states he wants to go home and die at home. Discussed about hospice and states he is interested in enrolling hospice  8/12: - Discussed with Kendra from IR, peritoneal catheter would be an option to drain ascites fluid if patient to discharge with hospice, however he needs to have stable living situation or caregiver who would manage that.   8/13: Discussed with IR, plan for IR peritoneal cathter placement possible Monday--consult in place, they will put him on the schedule for Monday for now, if any change they will need to be notified Monday morning.   - hospice meeting on 8/12 done  - SW assisting with hospice placement    IIeus  Patient with n/v on 8/12- 8/13 . Abdominal pain which is intermittent sharp pain that is different from his usual abdominal pain. Distension, but has chronic ascites and feels more bloated. No BM x 24hrs.  AXR 8/13 showed moderate gaseous distention of large and small bowel most consistent with an adynamic ileus. Received gently IVF at 50cc/hr for total 1L and placed NPO. Multiple BMs overnight.  - hungry and wants to eat, start clear liquid diet, advance in the next day or 2 as tolerated  - appreciate general surgery consultation  - dulcolax suppository prn    C Diff colitis.  Patient reports diarrhea for about a week prior to presentation.  C diff PCR positive in the ED  - Vanco day 10/10 on 8/13, Received IV flagyl on 8/13, since has received full course of vanco, stop flagyl 8/14       Alcoholism/alcohol dependence  Alcohol intoxication  History of alcohol withdrawals  History of depression/anxiety  Patient had a recent hospitalization at [facility] from where he was discharged to TCU.  After he returned back home, he started drinking again.  He admits he drinks 1 pint of vodka daily.  He stated that he has a .  His ethanol level is 0.23.    - Psychiatry evaluated the  patient on 8/6, see note for details  - Continue medications: Mirtazapine, Trinellix, Seroquel, Trazodone   - stop vitamins given hospice  - Patient is supposedly already committed and social work is following to help with disposition      Acute on chronic anemia   Hemoglobin dropped to a fabiola of 6.9.  Status post 1 unit PRBC transfused 8/5  -Hemoglobin has remained stable around 8 since transfusion  - No further GI bleed  - GI following now and appreciate their recommendations.  No plans for endoscopy at this time    - no further blood draws at this time     Atypical chest pain. Resolved   Patient complained of chest pain since arrival to the ED.  Troponins negative.  EKG without evidence of acute ischemia     Generalized weakness  Failure to thrive  This is likely multifactorial from his chronic alcohol use, alcoholic liver cirrhosis, poor oral intake, and recent diarrhea.    - plan as above to discharge with hospice     Hypoglycemia  - resolved  Blood sugars were in the 40s upon arrival in the ER.  This is likely due to poor oral intake.  He was given a couple of doses of D50 with improvement of his blood sugars.    - Hypoglycemia protocol in place  - BS > 100.Monitor for now      Tobacco use disorder  - Nicotine patch had been ordered     Recent Enterococcus bacteremia  He had been treated with ampicillin IV for 2 weeks as per ID recommendations.  He denies any fevers or leukocytosis at this time.     H/o BPH  - PTA Flomax and Proscar     History of COPD  This does not seem to be an acute issue at this time.    - PTA inhalers      Obstructive sleep apnea   Noncompliant with CPAP.     Thrombocytopenia, chronic, related to his chronic liver disease  There is no evidence of active bleeding.  Platelet count on admission was 148.    - stable 114 most recent lab,    Urinary retention  - s/p jiang cath placement on 8/10.  - continue jiang at this time for ongoing comfort    Severe malnutrition  - in the context of  acute illness, chronic illness or disease  - supplements between meals per dietitian          Diet: Snacks/Supplements Adult: Ensure Enlive; Between Meals  NPO per Anesthesia Guidelines for Procedure/Surgery Except for: Meds, Ice Chips  Clear Liquid Diet    DVT Prophylaxis: Pneumatic Compression Devices  Leger Catheter: PRESENT, indication: Retention  Central Lines: None  Code Status: No CPR- Do NOT Intubate      Disposition Plan   Expected discharge:       The patient's care was discussed with the Bedside Nurse, Care Coordinator/, Patient and Patient's Family.    Left voice mail for Seth (patient's daughter) per her request for updates.     Chely Calhoun MD  Hospitalist Service  [facility]  Securely message with the Vocera Web Console (learn more here)  Text page via [email or web address] Paging/Directory      Clinically Significant Risk Factors Present on Admission                ______________________________________________________________________    Interval History   Patient had multiple BMs last night after suppository. He reports nausea is much better. States abdominal pain is much better. Stats he is hungry and wants to eat. Discussed about clear liquid diet and gradual advancement.     Data reviewed today: I reviewed all medications, new labs and imaging results over the last 24 hours. I personally reviewed no images or EKG's today.    Physical Exam   Vital Signs: Temp: 98  F (36.7  C) Temp src: Oral BP: 96/52 Pulse: 65   Resp: 18 SpO2: 93 % O2 Device: None (Room air)    Weight: 173 lbs .98 oz  General Appearance: Alert, awake and no apparent distress  Respiratory: CTAB in upper lung fields, diminished at the bases  Cardiovascular: regular rate and rhythm  GI: distended, tender  Skin: warm and dry      Data   Recent Labs   Lab 08/12/21  1235 08/12/21  0823 08/11/21  0824 08/10/21  0731 08/09/21  0808 08/09/21  0808 08/08/21  1305 08/07/21  1707 08/07/21  1159   WBC  --   --   3.8*  --   --   --  5.0  --   --    HGB  --   --  8.0*  --   --   --  8.7*  --  9.2*   MCV  --   --  96  --   --   --  97  --   --    PLT  --   --  114*  --   --   --  108*  --   --    INR  --   --  1.41*  --   --   --  1.34*  --   --    NA  --   --  132* 130*  --  134  134 131*   < >  --    POTASSIUM  --   --  4.2 4.3  --  4.4  4.3 4.2   < >  --    CHLORIDE  --   --  105 103  --  108  107 106   < >  --    CO2  --   --  16* 18*  --  17*  18* 17*   < >  --    BUN  --   --  47* 48*  --  45*  45* 44*   < >  --    CR  --   --  3.83* 3.51*  --  3.44*  3.43* 3.33*   < >  --    ANIONGAP  --   --  11 9  --  9  9 8   < >  --    KEV  --   --  8.2* 8.1*  --  8.0*  8.1* 7.8*   < >  --    * 105* 109* 109*   < > 106*  108* 175*   < >  --    ALBUMIN  --   --   --  3.4  --  3.2*  --   --   --    PROTTOTAL  --   --   --   --   --  6.4*  --   --   --    BILITOTAL  --   --   --   --   --  0.6  --   --   --    ALKPHOS  --   --   --   --   --  118  --   --   --    ALT  --   --   --   --   --  18  --   --   --    AST  --   --   --   --   --  19  --   --   --     < > = values in this interval not displayed.     Recent Results (from the past 24 hour(s))   XR Abdomen 1 View    Narrative    XR ABDOMEN 1 VIEW 8/13/2021 11:42 AM    HISTORY: C.diff, abdominal pain, nausea/emesis    COMPARISON: None.      Impression    IMPRESSION: Moderate gaseous distention of large and small bowel most  consistent with an adynamic ileus. There is gas through the rectum. No  definite free intraperitoneal air.    ANIL KERN MD         SYSTEM ID:  [id number]   US Paracentesis    Narrative    US PARACENTESIS 8/13/2021 2:58 PM    CLINICAL HISTORY: HIGH VOLUME paracentesis with or without diagnostic  fluid analysis with labs to be drawn if ordered. Total paracentesis  volume as much as possible.    PROCEDURE: Informed consent obtained. Time out performed. The abdomen  was prepped and draped in a sterile fashion. 10 mL of 1% lidocaine was  infused  into local soft tissues. A 5 Maltese catheter system was  introduced into the abdominal ascites under ultrasound guidance.    2.3 liters of clear fluid were removed and sent to lab if requested.    Patient tolerated procedure well.    Ultrasound imaging was obtained and placed in the patient's permanent  medical record.      Impression    IMPRESSION:  1.  Status post ultrasound-guided paracentesis.    ANIL KERN MD         SYSTEM ID:  [id number]     Medications     - MEDICATION INSTRUCTIONS -       - MEDICATION INSTRUCTIONS -       [START ON 8/16/2021] sodium chloride 0.9%         ceFAZolin  2 g Intravenous Pre-Op/Pre-procedure x 1 dose     finasteride  5 mg Oral Daily     [Held by provider] folic acid  1 mg Oral Daily     [Held by provider] gabapentin  200 mg Oral TID     lactulose  20 g Oral TID     lidocaine (buffered or not buffered)  5 mL Intradermal Once     midodrine  10 mg Oral TID w/meals     mirtazapine  30 mg Oral At Bedtime     nicotine  1 patch Transdermal Daily     nicotine   Transdermal Q8H     [Held by provider] pantoprazole  40 mg Oral BID AC     pantoprazole (PROTONIX) IV  40 mg Intravenous BID     [Held by provider] QUEtiapine  200 mg Oral At Bedtime     sodium chloride (PF)  3 mL Intracatheter Q8H     [Held by provider] tamsulosin  0.8 mg Oral Daily     [Held by provider] traZODone  100 mg Oral At Bedtime     vancomycin  125 mg Oral 4x Daily     vortioxetine  10 mg Oral BID

## 2021-08-14 NOTE — PLAN OF CARE
"DATE & TIME: 8/13/2021 [id number]         Cognitive Concerns/ Orientation : A & O x 2-3, appears more confused tonight.   BEHAVIOR & AGGRESSION TOOL COLOR: Green  CIWA SCORE: NA    ABNL VS/O2: VSS on RA  MOBILITY: Assist x 1 with GB/walker. Refused to get up to BR over night. Offered multiple times.   PAIN MANAGMENT: C/O abd pain/discomfort, PRN oxycodone given x1.   DIET: NPO except for meds.   BOWEL/BLADDER: Leger patent. Abdomen distended/taut. Had large x 4-5 bms over night. BS hyperactive.   ABNL LAB/BG: Last labs 08/11 Cr 3.83 and Hgb 8.0.   DRAIN/DEVICES: PIV infusing NS @ 50 mL/hr. Leger cath for retention.  TELEMETRY RHYTHM: NA  SKIN: Pale/dusky color. Scattered scabs and bruises. Foam dressing on left arm CDI. Right abdominal paracentesis dressing CDI. Penis/linda area Red/rash, powdered applied. Coccyx pink blanchable, barrier applied.   TESTS/PROCEDURES: Abd xray done yesterday shows ileus. U/S paracentesis done yesterday, 2.3 L of clear fluid removed. Plan for IR Intraperitoneal Cath Tunnel Ascites placement on Monday; Kendra will consent with pt on Monday.   D/C DATE:Per MD note \"Patient is now with ileus and need to have this improve/resolve. Plan is to discharge to hospice\" .    OTHER IMPORTANT INFO: C- Diff, Enteric isolation maintained . Patient on po vanco and iv Flagyl. Surgery following.   "

## 2021-08-15 LAB
BACTERIA FLD CULT: NO GROWTH
GRAM STAIN RESULT: NORMAL
GRAM STAIN RESULT: NORMAL

## 2021-08-15 PROCEDURE — 250N000011 HC RX IP 250 OP 636: Performed by: INTERNAL MEDICINE

## 2021-08-15 PROCEDURE — 99232 SBSQ HOSP IP/OBS MODERATE 35: CPT | Performed by: INTERNAL MEDICINE

## 2021-08-15 PROCEDURE — 250N000013 HC RX MED GY IP 250 OP 250 PS 637: Performed by: INTERNAL MEDICINE

## 2021-08-15 PROCEDURE — C9113 INJ PANTOPRAZOLE SODIUM, VIA: HCPCS | Performed by: INTERNAL MEDICINE

## 2021-08-15 PROCEDURE — 120N000001 HC R&B MED SURG/OB

## 2021-08-15 RX ADMIN — VORTIOXETINE 10 MG: 10 TABLET, FILM COATED ORAL at 21:26

## 2021-08-15 RX ADMIN — VORTIOXETINE 10 MG: 10 TABLET, FILM COATED ORAL at 08:39

## 2021-08-15 RX ADMIN — NICOTINE 1 PATCH: 14 PATCH, EXTENDED RELEASE TRANSDERMAL at 08:39

## 2021-08-15 RX ADMIN — LACTULOSE 20 G: 20 SOLUTION ORAL at 08:39

## 2021-08-15 RX ADMIN — TAMSULOSIN HYDROCHLORIDE 0.8 MG: 0.4 CAPSULE ORAL at 10:14

## 2021-08-15 RX ADMIN — PANTOPRAZOLE SODIUM 40 MG: 40 TABLET, DELAYED RELEASE ORAL at 15:54

## 2021-08-15 RX ADMIN — ONDANSETRON 4 MG: 2 INJECTION INTRAMUSCULAR; INTRAVENOUS at 20:54

## 2021-08-15 RX ADMIN — PANTOPRAZOLE SODIUM 40 MG: 40 INJECTION, POWDER, FOR SOLUTION INTRAVENOUS at 08:39

## 2021-08-15 RX ADMIN — MIRTAZAPINE 30 MG: 15 TABLET, FILM COATED ORAL at 21:26

## 2021-08-15 RX ADMIN — OXYCODONE HYDROCHLORIDE 10 MG: 5 TABLET ORAL at 14:08

## 2021-08-15 RX ADMIN — OXYCODONE HYDROCHLORIDE 10 MG: 5 TABLET ORAL at 21:25

## 2021-08-15 RX ADMIN — MICONAZOLE NITRATE: 20 POWDER TOPICAL at 14:04

## 2021-08-15 RX ADMIN — MIDODRINE HYDROCHLORIDE 10 MG: 5 TABLET ORAL at 08:39

## 2021-08-15 RX ADMIN — FINASTERIDE 5 MG: 5 TABLET, FILM COATED ORAL at 08:39

## 2021-08-15 RX ADMIN — QUETIAPINE FUMARATE 50 MG: 50 TABLET ORAL at 10:14

## 2021-08-15 RX ADMIN — LACTULOSE 20 G: 20 SOLUTION ORAL at 15:54

## 2021-08-15 RX ADMIN — MICONAZOLE NITRATE: 20 POWDER TOPICAL at 20:56

## 2021-08-15 RX ADMIN — OXYCODONE HYDROCHLORIDE 10 MG: 5 TABLET ORAL at 00:16

## 2021-08-15 ASSESSMENT — ACTIVITIES OF DAILY LIVING (ADL)
ADLS_ACUITY_SCORE: 14
ADLS_ACUITY_SCORE: 13
ADLS_ACUITY_SCORE: 13
ADLS_ACUITY_SCORE: 14

## 2021-08-15 ASSESSMENT — MIFFLIN-ST. JEOR: SCORE: 1528.63

## 2021-08-15 NOTE — PROGRESS NOTES
"[facility]    Medicine Progress Note - Hospitalist Service       Date of Admission:  8/3/2021    Assessment & Plan         Jim Richard is a 66-year-old gentleman with a complex past medical history of alcohol abuse/dependence complicated by alcoholic liver disease with ascites, status post multiple paracentesis in the past, hepatorenal syndrome, chronic kidney disease stage IV, history of recent bacteremia due to Enterococcus, hypertension, COPD, tobacco use disorder, depression/anxiety, thrombocytopenia and obstructive sleep apnea, who presented to ER for evaluation of generalized weakness, diarrhea and dry heaves.     Decompensated alcoholic liver cirrhosis with recurrent ascites  Portal Hypertension  Possible hepatorenal syndrome with CKD stage IV  Patient has been having issues with recurrent ascites requiring multiple paracenteses.  It seems that last paracentesis was on 07/26/2021 when 8.7 liters of fluid was removed before that had paracentesis on 07/2015 with 5.2 liters fluid removed.  Presents with recurrent abdominal distention  * Paracentesis (8/4):  Removed over 6 L and s/p paracentesis on 8/9. Fluids not indicated of SBP. Consistent with protal HTN.  Has received albumin with above.    - s/p paracentesis on 8/13 for comfort with removal of 2.3L  - received midodrine and octreotid stopped since goal is hospice  - cr on admission 2.18 and has been trending up and 3.83 despite above measures  - nephrology followed, appreciate assistance, poor candidate for dialysis and given HRS not a tx candidate. Signed off now.   - appreciate GI assistance, no other treatment from GI stand point to improve outcome or symptom  - as below, plan for abdominal cathter placement since going on hospice to drain ascites on prn basis for comfort and will be difficult to get him in for frequent paracentesis after discharge with hospice  -----> per hospice RN Note 8/12: \"Please note hospice standing " "orders for draining pleurx is: drain 2L 3x week prn for comfort.\"    Goals of care  8/11:Had discussion with Jim his overall prognosis, liver failure, recurrent ascites and worsening renal function. Jim states he understands his prognosis. He states even if dialysis is recommended, it is not something he would want to do. He states he wants to go home and die at home. Discussed about hospice and states he is interested in enrolling hospice  8/12: - Discussed with Kendra from IR, abdominal catheter would be an option to drain ascites fluid if patient to discharge with hospice, however he needs to have stable living situation or caregiver who would manage that.   8/13: Discussed with IR, plan for IR abdominal catheter placement possible Monday--consult in place, NPO at 9am on 8/16 order in place by IR, ok to eat breakfast since procedure planned for afternoon  - hospice meeting on 8/12 done--see consult note for details.   - SW assisting with hospice placement. He is too weak to discharge home w/hospice and does not have enough assistance if he discharges home. Plan to discharge to facility with hospice. Jim understands this and in agreement with discharge planning.    IIeus  Patient with n/v on 8/12- 8/13 . Abdominal pain which is intermittent sharp pain that is different from his usual abdominal pain. Distension, but has chronic ascites and feels more bloated. No BM x 24hrs.  AXR 8/13 showed moderate gaseous distention of large and small bowel most consistent with an adynamic ileus. Received gently IVF at 50cc/hr for total 1L and placed NPO. Multiple BMs overnight. Diet now being gradually advanced  - advanced to full liquid diet today, if tolerated possible low fiber diet tonight   - appreciate general surgery consultation  - dulcolax suppository prn    C Diff colitis.  Patient reports diarrhea for about a week prior to presentation.  C diff PCR positive in the ED  - has completed 10 days of Vanco on " 8/13    Alcoholism/alcohol dependence  Alcohol intoxication  History of alcohol withdrawals  History of depression/anxiety  Patient had a recent hospitalization at [facility] from where he was discharged to TCU.  After he returned back home, he started drinking again.  He admits he drinks 1 pint of vodka daily.  He stated that he has a .  His ethanol level is 0.23.    - Psychiatry evaluated the patient on 8/6, see note for details  - Continue medications: Mirtazapine, Trinellix. Held Seroquel, Trazodone   - stop vitamins given hospice  - Patient is supposedly already committed and social work is following to help with disposition      Acute on chronic anemia   Hemoglobin dropped to a fabiola of 6.9.  Status post 1 unit PRBC transfused 8/5  - Hemoglobin has remained stable around 8 since transfusion  - No further GI bleed.  No plans for endoscopy at this time per GI    - no further blood draws at this time     Atypical chest pain. Resolved   Patient complained of chest pain since arrival to the ED.  Troponins negative.  EKG without evidence of acute ischemia     Generalized weakness  Failure to thrive  This is likely multifactorial from his chronic alcohol use, alcoholic liver cirrhosis, poor oral intake, and recent diarrhea.    - plan as above to discharge with hospice     Hypoglycemia  - resolved  Blood sugars were in the 40s upon arrival in the ER.  This is likely due to poor oral intake.  He was given a couple of doses of D50 with improvement of his blood sugars.      Tobacco use disorder  - Nicotine patch had been ordered     Recent Enterococcus bacteremia  He had been treated with ampicillin IV for 2 weeks as per ID recommendations.       H/o BPH  Urinary retention  - s/p jiang cath placement on 8/10.  - PTA Flomax and Proscar  - continue jiang at discharge given hospice/for comfort purposes     History of COPD  This does not seem to be an acute issue at this time.    - PTA inhalers  "     Obstructive sleep apnea   Noncompliant with CPAP.     Thrombocytopenia, chronic, related to his chronic liver disease  There is no evidence of active bleeding.  Platelet count on admission was 148.    - stable 114 most recent lab,    Severe malnutrition  - in the context of acute illness, chronic illness or disease  - supplements between meals per dietitian          Diet: Snacks/Supplements Adult: Ensure Enlive; Between Meals  NPO per Anesthesia Guidelines for Procedure/Surgery Except for: Meds, Ice Chips  Full Liquid Diet    DVT Prophylaxis: Pneumatic Compression Devices  Leger Catheter: PRESENT, indication: Retention  Central Lines: None  Code Status: No CPR- Do NOT Intubate      Disposition Plan   Expected discharge:       The patient's care was discussed with the Bedside Nurse, Care Coordinator/ and Patient.    DaughterAleksandra, updated by phone.      Chely Calhoun MD  Hospitalist Service  [facility]  Securely message with the Vocera Web Console (learn more here)  Text page via [employer] Paging/Directory      Clinically Significant Risk Factors Present on Admission                ______________________________________________________________________    Interval History   Had BM this morning. Denies n/v. Tolerating clear liquid diet. Continues to have abdominal pain(note this is the chronic abdominal pain he has been complaining about.) states he is no longer having the intermittent sharp abdominal pain. Denies cough. States \"just keep me comfortable.\"   Discussed again about plan for abdominal cathter placement tomorrow and plan discharge with hospice once arranged.   He reports he slept a lot last night and felt disoriented this morning. He feels more clear at time of visit.       Data reviewed today: I reviewed all medications, new labs and imaging results over the last 24 hours. I personally reviewed no images or EKG's today.    Physical Exam   Vital Signs: Temp: " 99.5  F (37.5  C) Temp src: Oral BP: 121/65 Pulse: 60   Resp: 16 SpO2: 92 % O2 Device: None (Room air)    Weight: 175 lbs 4.25 oz  General Appearance: Alert, awake and no apparent distress  Respiratory: CTAB in upper lung fields, diminished at the bases  Cardiovascular: regular rate and rhythm  GI: distended, tender, +BS  Skin: warm and dry      Data   Recent Labs   Lab 08/12/21  1235 08/12/21  0823 08/11/21  0824 08/10/21  0731 08/09/21  0808 08/09/21  0808 08/08/21  1305   WBC  --   --  3.8*  --   --   --  5.0   HGB  --   --  8.0*  --   --   --  8.7*   MCV  --   --  96  --   --   --  97   PLT  --   --  114*  --   --   --  108*   INR  --   --  1.41*  --   --   --  1.34*   NA  --   --  132* 130*  --  134  134 131*   POTASSIUM  --   --  4.2 4.3  --  4.4  4.3 4.2   CHLORIDE  --   --  105 103  --  108  107 106   CO2  --   --  16* 18*  --  17*  18* 17*   BUN  --   --  47* 48*  --  45*  45* 44*   CR  --   --  3.83* 3.51*  --  3.44*  3.43* 3.33*   ANIONGAP  --   --  11 9  --  9  9 8   KEV  --   --  8.2* 8.1*  --  8.0*  8.1* 7.8*   * 105* 109* 109*   < > 106*  108* 175*   ALBUMIN  --   --   --  3.4  --  3.2*  --    PROTTOTAL  --   --   --   --   --  6.4*  --    BILITOTAL  --   --   --   --   --  0.6  --    ALKPHOS  --   --   --   --   --  118  --    ALT  --   --   --   --   --  18  --    AST  --   --   --   --   --  19  --     < > = values in this interval not displayed.     No results found for this or any previous visit (from the past 24 hour(s)).  Medications     - MEDICATION INSTRUCTIONS -       - MEDICATION INSTRUCTIONS -       [START ON 8/16/2021] sodium chloride 0.9%         ceFAZolin  2 g Intravenous Pre-Op/Pre-procedure x 1 dose     finasteride  5 mg Oral Daily     [Held by provider] gabapentin  200 mg Oral TID     lactulose  20 g Oral TID     lidocaine (buffered or not buffered)  5 mL Intradermal Once     midodrine  10 mg Oral TID w/meals     mirtazapine  30 mg Oral At Bedtime     nicotine  1  patch Transdermal Daily     nicotine   Transdermal Q8H     pantoprazole  40 mg Oral BID AC     [Held by provider] QUEtiapine  200 mg Oral At Bedtime     sodium chloride (PF)  3 mL Intracatheter Q8H     tamsulosin  0.8 mg Oral Daily     [Held by provider] traZODone  100 mg Oral At Bedtime     vortioxetine  10 mg Oral BID

## 2021-08-15 NOTE — PROGRESS NOTES
Care Management Follow Up    Length of Stay (days): 11    Expected Discharge Date: 08/16/2021     Concerns to be Addressed: adjustment to diagnosis/illness, discharge planning     Patient plan of care discussed at interdisciplinary rounds: Yes    Anticipated Discharge Disposition: Hospice     Anticipated Discharge Services:    Anticipated Discharge DME:      Patient/family educated on Medicare website which has current facility and service quality ratings: no  Education Provided on the Discharge Plan:    Patient/Family in Agreement with the Plan: yes    Referrals Placed by CM/SW: Hospice  Private pay costs discussed: cost for care center placement    Additional Information:  On Monday, writer will speak with [facility] to determine if they can offer patient admission.  Have also made referrals to care centers as a back up plan.    TANA CejaSW

## 2021-08-15 NOTE — PLAN OF CARE
DATE & TIME: 8/15/21 Day shift  Cognitive Concerns/ Orientation : A&Ox4  BEHAVIOR & AGGRESSION TOOL COLOR: Green  CIWA SCORE: NA    ABNL VS/O2: VSS on RA  MOBILITY: Assist x 1 with GB/walker.   PAIN MANAGMENT: Denies  DIET: Full liquid diet- if tolerates can do low fiber diet for dinner  BOWEL/BLADDER: Leger patent. Abdomen distended/taut. 1 soft BMs this shift, BS active.   ABNL LAB/BG: No labs since 08/11.   DRAIN/DEVICES:  Leger cath for retention; R PIV SL  TELEMETRY RHYTHM: NA  SKIN: Pale/dusky color. Scattered scabs and bruises. Foam dressing on left arm CDI. Right abdominal paracentesis dressing CDI. Penis/linda area Red/rash, miconazole powder applied. Coccyx pink blanchable, powder applied  TESTS/PROCEDURES: Plan for IR Intraperitoneal Cath Tunnel for hospice placement on Monday; Kendra will consent with pt on Monday.   D/C DATE:  Will have catheter placed on Monday and then transition to hospice. SW following.   OTHER IMPORTANT INFO: C- Diff, Enteric isolation maintained .

## 2021-08-15 NOTE — PLAN OF CARE
DATE & TIME: 8/14/21 PM shift      Cognitive Concerns/ Orientation : A&O x4.  BEHAVIOR & AGGRESSION TOOL COLOR: Green  CIWA SCORE: NA    ABNL VS/O2: VSS on RA  MOBILITY: Assist x 1 with GB/walker. Up to chair for meals  PAIN MANAGMENT: PRN oxycodone x 1 for c/o abdominal pain  DIET: Clear liquid diet- tolerating well and good appetite. Denies nausea.  BOWEL/BLADDER: Leger patent. Abdomen distended/soft. No BMs this shift, lactulose restarted.   ABNL LAB/BG: no lab today   DRAIN/DEVICES:  Leger cath for retention; L IV SL  TELEMETRY RHYTHM: NA  SKIN: Pale/dusky color. Scattered scabs and bruises. Foam dressing on left arm CDI. Right abdominal paracentesis dressing CDI.  TESTS/PROCEDURES:  U/S paracentesis done yesterday, 2.3 L of clear fluid removed. Plan for IR Intraperitoneal Cath Tunnel for hospice placement on Monday; Kendra will consent with pt on Monday.   D/C DATE:  Will have catheter placed on Monday and then transition to hospice. SW following.   OTHER IMPORTANT INFO: C- Diff, Enteric isolation maintained .      pts wife is requesting Dr. Golden to review blood work that was done in there ER yesterday.  Pt was seen for fever, headache.

## 2021-08-15 NOTE — PROGRESS NOTES
SPIRITUAL HEALTH SERVICES Progress Note  [facility]    Referral Source: Request for assistance with Health Care Directive    Reviewed HCD which has now been completed by PT.    Will submit request for virtual notarization of document by [employer].      Donell Bah  Chaplain Resident

## 2021-08-15 NOTE — PLAN OF CARE
DATE & TIME: 8/14/21 2300--9674     Cognitive Concerns/ Orientation : A & O x4. Flat/sad affect.   BEHAVIOR & AGGRESSION TOOL COLOR: Green  CIWA SCORE: NA    ABNL VS/O2: VSS on RA  MOBILITY: Assist x 1 with GB/walker.   PAIN MANAGMENT: PRN oxycodone x 1 for c/o abdominal pain. Pt called for it.   DIET: Clear liquid diet- tolerating well and good appetite. Denies nausea.  BOWEL/BLADDER: Leger patent. Abdomen distended/taut. No BMs this shift, BS active.   ABNL LAB/BG: No labs since 08/11.   DRAIN/DEVICES:  Leger cath for retention; R PIV SL  TELEMETRY RHYTHM: NA  SKIN: Pale/dusky color. Scattered scabs and bruises. Foam dressing on left arm CDI. Right abdominal paracentesis dressing CDI. Penis/linda area Red/rash, powdered applied. Coccyx pink blanchable, barrier applied.   TESTS/PROCEDURES: Plan for IR Intraperitoneal Cath Tunnel for hospice placement on Monday; Kendra will consent with pt on Monday.   D/C DATE:  Will have catheter placed on Monday and then transition to hospice. SW following.   OTHER IMPORTANT INFO: C- Diff, Enteric isolation maintained .

## 2021-08-16 ENCOUNTER — APPOINTMENT (OUTPATIENT)
Dept: INTERVENTIONAL RADIOLOGY/VASCULAR | Facility: CLINIC | Age: 66
DRG: 981 | End: 2021-08-16
Attending: INTERNAL MEDICINE
Payer: MEDICARE

## 2021-08-16 PROCEDURE — 272N000500 HC NEEDLE CR2

## 2021-08-16 PROCEDURE — 250N000009 HC RX 250: Performed by: NURSE PRACTITIONER

## 2021-08-16 PROCEDURE — 272N000602 HC WOUND GLUE CR1

## 2021-08-16 PROCEDURE — 120N000001 HC R&B MED SURG/OB

## 2021-08-16 PROCEDURE — 49418 INSERT TUN IP CATH PERC: CPT

## 2021-08-16 PROCEDURE — 0W9G30Z DRAINAGE OF PERITONEAL CAVITY WITH DRAINAGE DEVICE, PERCUTANEOUS APPROACH: ICD-10-PCS | Performed by: RADIOLOGY

## 2021-08-16 PROCEDURE — 250N000013 HC RX MED GY IP 250 OP 250 PS 637: Performed by: INTERNAL MEDICINE

## 2021-08-16 PROCEDURE — C1729 CATH, DRAINAGE: HCPCS

## 2021-08-16 PROCEDURE — 250N000011 HC RX IP 250 OP 636: Performed by: NURSE PRACTITIONER

## 2021-08-16 PROCEDURE — 99233 SBSQ HOSP IP/OBS HIGH 50: CPT | Performed by: HOSPITALIST

## 2021-08-16 RX ORDER — NALOXONE HYDROCHLORIDE 0.4 MG/ML
0.4 INJECTION, SOLUTION INTRAMUSCULAR; INTRAVENOUS; SUBCUTANEOUS
Status: DISCONTINUED | OUTPATIENT
Start: 2021-08-16 | End: 2021-08-16 | Stop reason: HOSPADM

## 2021-08-16 RX ORDER — NALOXONE HYDROCHLORIDE 0.4 MG/ML
0.2 INJECTION, SOLUTION INTRAMUSCULAR; INTRAVENOUS; SUBCUTANEOUS
Status: DISCONTINUED | OUTPATIENT
Start: 2021-08-16 | End: 2021-08-16 | Stop reason: HOSPADM

## 2021-08-16 RX ORDER — FLUMAZENIL 0.1 MG/ML
0.2 INJECTION, SOLUTION INTRAVENOUS
Status: DISCONTINUED | OUTPATIENT
Start: 2021-08-16 | End: 2021-08-16 | Stop reason: HOSPADM

## 2021-08-16 RX ORDER — FENTANYL CITRATE 50 UG/ML
25-50 INJECTION, SOLUTION INTRAMUSCULAR; INTRAVENOUS EVERY 5 MIN PRN
Status: DISCONTINUED | OUTPATIENT
Start: 2021-08-16 | End: 2021-08-16 | Stop reason: HOSPADM

## 2021-08-16 RX ORDER — HYDROMORPHONE HCL IN WATER/PF 6 MG/30 ML
.2-.4 PATIENT CONTROLLED ANALGESIA SYRINGE INTRAVENOUS
Status: DISCONTINUED | OUTPATIENT
Start: 2021-08-16 | End: 2021-08-17

## 2021-08-16 RX ADMIN — MICONAZOLE NITRATE: 20 POWDER TOPICAL at 08:21

## 2021-08-16 RX ADMIN — MIRTAZAPINE 30 MG: 15 TABLET, FILM COATED ORAL at 21:08

## 2021-08-16 RX ADMIN — PANTOPRAZOLE SODIUM 40 MG: 40 TABLET, DELAYED RELEASE ORAL at 15:59

## 2021-08-16 RX ADMIN — LIDOCAINE HYDROCHLORIDE 8 ML: 10 INJECTION, SOLUTION INFILTRATION; PERINEURAL at 10:24

## 2021-08-16 RX ADMIN — MICONAZOLE NITRATE: 20 POWDER TOPICAL at 21:13

## 2021-08-16 RX ADMIN — OXYCODONE HYDROCHLORIDE 10 MG: 5 TABLET ORAL at 12:51

## 2021-08-16 RX ADMIN — MIDAZOLAM HYDROCHLORIDE 1 MG: 1 INJECTION, SOLUTION INTRAMUSCULAR; INTRAVENOUS at 10:13

## 2021-08-16 RX ADMIN — QUETIAPINE FUMARATE 50 MG: 50 TABLET ORAL at 02:47

## 2021-08-16 RX ADMIN — SODIUM CHLORIDE 1000 ML: 9 INJECTION, SOLUTION INTRAVENOUS at 10:06

## 2021-08-16 RX ADMIN — TAMSULOSIN HYDROCHLORIDE 0.8 MG: 0.4 CAPSULE ORAL at 08:19

## 2021-08-16 RX ADMIN — OXYCODONE HYDROCHLORIDE 10 MG: 5 TABLET ORAL at 19:41

## 2021-08-16 RX ADMIN — NICOTINE 1 PATCH: 14 PATCH, EXTENDED RELEASE TRANSDERMAL at 08:18

## 2021-08-16 RX ADMIN — VORTIOXETINE 10 MG: 10 TABLET, FILM COATED ORAL at 08:19

## 2021-08-16 RX ADMIN — FINASTERIDE 5 MG: 5 TABLET, FILM COATED ORAL at 08:19

## 2021-08-16 RX ADMIN — LACTULOSE 20 G: 20 SOLUTION ORAL at 21:08

## 2021-08-16 RX ADMIN — PANTOPRAZOLE SODIUM 40 MG: 40 TABLET, DELAYED RELEASE ORAL at 08:19

## 2021-08-16 RX ADMIN — FENTANYL CITRATE 50 MCG: 50 INJECTION, SOLUTION INTRAMUSCULAR; INTRAVENOUS at 10:13

## 2021-08-16 RX ADMIN — CEFAZOLIN SODIUM 2 G: 2 INJECTION, SOLUTION INTRAVENOUS at 09:34

## 2021-08-16 RX ADMIN — LACTULOSE 20 G: 20 SOLUTION ORAL at 08:19

## 2021-08-16 RX ADMIN — FENTANYL CITRATE 50 MCG: 50 INJECTION, SOLUTION INTRAMUSCULAR; INTRAVENOUS at 10:08

## 2021-08-16 RX ADMIN — VORTIOXETINE 10 MG: 10 TABLET, FILM COATED ORAL at 21:08

## 2021-08-16 RX ADMIN — OXYCODONE HYDROCHLORIDE 10 MG: 5 TABLET ORAL at 04:02

## 2021-08-16 RX ADMIN — QUETIAPINE FUMARATE 50 MG: 50 TABLET ORAL at 21:23

## 2021-08-16 RX ADMIN — MIDAZOLAM HYDROCHLORIDE 1 MG: 1 INJECTION, SOLUTION INTRAMUSCULAR; INTRAVENOUS at 10:08

## 2021-08-16 ASSESSMENT — ACTIVITIES OF DAILY LIVING (ADL)
ADLS_ACUITY_SCORE: 13

## 2021-08-16 NOTE — PROGRESS NOTES
[facility]  Hospitalist Progress Note   08/16/2021          Assessment and Plan:       Jim Richard is a 66-year-old gentleman with alcohol abuse/dependence complicated by alcoholic liver disease with ascites, status post multiple paracentesis in the past, hepatorenal syndrome, chronic kidney disease stage IV, history of recent bacteremia due to Enterococcus, hypertension, COPD, tobacco use disorder, depression/anxiety, thrombocytopenia and obstructive sleep apnea admitted on 8/3/2021 with generalized weakness.    Status post tunneled abdominal catheter for recurrent ascites 8/16/2021.  Decompensated alcoholic liver cirrhosis with recurrent ascites  Portal Hypertension  Possible hepatorenal syndrome with CKD stage IV  Patient has been having issues with recurrent ascites requiring multiple paracenteses.  It seems that last paracentesis was on 07/26/2021 when 8.7 liters of fluid was removed before that had paracentesis on 07/2015 with 5.2 liters fluid removed.  Presents with recurrent abdominal distention  * Paracentesis (8/4):  Removed over 6 L and s/p paracentesis on 8/9. Fluids not indicated of SBP. Consistent with protal HTN.  Has received albumin with above.    - s/p paracentesis on 8/13 for comfort with removal of 2.3L  - received midodrine and octreotid stopped since goal is hospice  - cr on admission 2.18 and has been trending up and 3.83 despite above measures  - nephrology followed, appreciate assistance, poor candidate for dialysis and given HRS not a tx candidate. Signed off now.   - appreciate GI assistance, no other treatment from GI stand point to improve outcome or symptom  Underwent Status post tunneled abdominal catheter for recurrent ascites 8/16/2021 by IR.  No immediate complication. Will need to drain approximately 2 L, about 3 times a week for comfort.    Goals of care  8/11:Had discussion with Jim his overall prognosis, liver failure, recurrent ascites and worsening  renal function. Jim states he understands his prognosis. He states even if dialysis is recommended, it is not something he would want to do. He states he wants to go home and die at home. Discussed about hospice and states he is interested in enrolling hospice  - hospice meeting on 8/12 done--see consult note for details.   - SW assisting with hospice placement.  Patient agreeing for hospice at care facility.     IIeus  Patient with n/v on 8/12- 8/13 . Abdominal pain which is intermittent sharp pain that is different from his usual abdominal pain. Distension, but has chronic ascites and feels more bloated. No BM x 24hrs.  AXR 8/13 showed moderate gaseous distention of large and small bowel most consistent with an adynamic ileus. Received gently IVF at 50cc/hr for total 1L and placed NPO. Multiple BMs overnight. Diet now being gradually advanced  Continue full liquid diet today, will advance in AM.  - appreciate general surgery consultation  - dulcolax suppository prn     C Diff colitis.  Patient reports diarrhea for about a week prior to presentation.  C diff PCR positive in the ED  - has completed 10 days of Vanco on 8/13  Contact precautions per hospital protocol.    Alcoholism/alcohol dependence  Alcohol intoxication  History of alcohol withdrawals  History of depression/anxiety  Patient had a recent hospitalization at [facility] from where he was discharged to TCU.  After he returned back home, he started drinking again.  He admits he drinks 1 pint of vodka daily.  He stated that he has a .  His ethanol level is 0.23.    - Psychiatry evaluated the patient on 8/6, see note for details  - Continue medications: Mirtazapine, Trinellix. Held Seroquel, Trazodone   - stop vitamins given hospice  - Patient is supposedly already committed and social work is following to help with disposition      Acute on chronic anemia   Hemoglobin dropped to a fabiola of 6.9.  Status post 1 unit PRBC  transfused 8/5  - Hemoglobin has remained stable around 8 since transfusion  - No further GI bleed.  No plans for endoscopy at this time per GI    - no further blood draws at this time     Atypical chest pain. Resolved   Patient complained of chest pain since arrival to the ED.  Troponins negative.  EKG without evidence of acute ischemia     Physical deconditioning with Adult failure to thrive multifactorial from his chronic alcohol use, alcoholic liver cirrhosis, poor oral intake, and recent diarrhea.    - plan as above to discharge with hospice     Hypoglycemia  - resolved  Blood sugars were in the 40s upon arrival in the ER.  This is likely due to poor oral intake.  He was given a couple of doses of D50 with improvement of his blood sugars.       Tobacco use disorder  - Nicotine patch had been ordered     Recent Enterococcus bacteremia  He had been treated with ampicillin IV for 2 weeks as per ID recommendations.       H/o BPH  Urinary retention  - s/p jiang cath placement on 8/10.  - PTA Flomax and Proscar  - continue jiang at discharge given hospice/for comfort purposes     History of COPD  This does not seem to be an acute issue at this time.    - PTA inhalers      Obstructive sleep apnea   Noncompliant with CPAP.     Thrombocytopenia, chronic, related to his chronic liver disease  There is no evidence of active bleeding.  Platelet count on admission was 148.    - stable 114 most recent lab,     Severe malnutrition  - in the context of acute illness, chronic illness or disease  - supplements between meals per dietitian       Diet: Snacks/Supplements Adult: Ensure Enlive; Between Meals  DVT Prophylaxis: Pneumatic Compression Devices  Jiang Catheter: PRESENT, indication: Retention  Code Status: No CPR- Do NOT Intubate    Disposition: Expected discharge pending safe discharge plan in place.    Discussed with patient, bedside RN, care coordinator on the floor.  Total time greater than 35 minutes [have taken over  patient's care on day 12 of hospitalization]  More than 60% of time spent in direct patient care, care coordination, patient counseling, and formalizing plan of care.     Elena Cihang MD        Interval History:      Patient lying in bed.  Abdomen distended, has just had tunneled abdominal catheter placement, under IV sedation.  Slightly drowsy.  Arousable, able to answer simple questions.  Denies any headache or dizziness.  No nausea or vomiting.  Reports would like to eat.  No acute events overnight.         Physical Exam:        Physical Exam   Temp:  [98.1  F (36.7  C)-98.5  F (36.9  C)] 98.5  F (36.9  C)  Pulse:  [54-71] 64  Resp:  [16-18] 16  BP: ()/(44-76) 97/44  SpO2:  [93 %-98 %] 93 %    Intake/Output Summary (Last 24 hours) at 8/16/2021 0937  Last data filed at 8/16/2021 0918  Gross per 24 hour   Intake 540 ml   Output 1700 ml   Net -1160 ml       Admission Weight: 86.2 kg (190 lb)  Current Weight: 79.5 kg (175 lb 4.3 oz)    PHYSICAL EXAM  GENERAL: Patient is in no distress. Slightly drowsy.  Arousable, able to answer simple questions.  HEENT: Oropharynx dry.  HEART: Regular rate and rhythm. S1S2.  Systolic murmur right sternal border.  LUNGS: Bilateral decreased breath sounds.  Respirations unlabored  ABDOMEN: Soft, distended, no abdominal tenderness, bowel sounds heard.  Right upper quadrant catheter in place.  NEURO: Moving all extremities.  Genitourinary Leger catheter in place.  EXTREMITIES: No pedal edema.  SKIN: Warm, dry. No rash  PSYCHIATRY Cooperative       Medications:          ceFAZolin  2 g Intravenous Pre-Op/Pre-procedure x 1 dose     finasteride  5 mg Oral Daily     [Held by provider] gabapentin  200 mg Oral TID     lactulose  20 g Oral TID     lidocaine (buffered or not buffered)  5 mL Intradermal Once     miconazole   Topical BID     mirtazapine  30 mg Oral At Bedtime     nicotine  1 patch Transdermal Daily     nicotine   Transdermal Q8H     pantoprazole  40 mg Oral BID AC      [Held by provider] QUEtiapine  200 mg Oral At Bedtime     sodium chloride (PF)  3 mL Intracatheter Q8H     tamsulosin  0.8 mg Oral Daily     [Held by provider] traZODone  100 mg Oral At Bedtime     vortioxetine  10 mg Oral BID     sodium chloride 0.9%, acetaminophen **OR** acetaminophen, albumin human, albuterol, bisacodyl, glucose **OR** dextrose **OR** glucagon, fentaNYL, flumazenil, flumazenil, fluticasone-vilanterol, heparin, heparin lock flush, HYDROmorphone, hydrOXYzine, lidocaine 4%, lidocaine (buffered or not buffered), lidocaine (buffered or not buffered), - MEDICATION INSTRUCTIONS -, - MEDICATION INSTRUCTIONS -, melatonin, midazolam, naloxone **OR** naloxone **OR** naloxone **OR** naloxone, naloxone **OR** naloxone **OR** naloxone **OR** naloxone, ondansetron **OR** ondansetron, oxyCODONE, prochlorperazine **OR** prochlorperazine **OR** prochlorperazine, QUEtiapine, sodium chloride (PF), sodium chloride (PF), sodium chloride 0.9%         Data:      All new lab and imaging data was reviewed.

## 2021-08-16 NOTE — IR NOTE
Interventional Radiology Intra-procedural Nursing Note    Patient Name: Jim Richard  Medical Record Number: [id number]  Today's Date: August 16, 2021    Start Time: 1014  End of procedure time: 1029  Procedure: Pleurx catheter placement  Report given to: Zainab Thorpe RN  Time pt departs:  1034  : no    Other Notes: Pt came to IR suite 1 on a cart.  Pt was moved over to the table and was placed in supine position.  Pt was draped and prepped with chlorhexidine soap.  Pt was given fentanyl and versed for comfort.      MEDICATIONS: Last Dose 1013    Fentanyl 100 mcg  Versed 2 mg  Lidocaine 8 ml's      Pablo Mann RN

## 2021-08-16 NOTE — PLAN OF CARE
DATE & TIME: 8/15/21 PM shift  Cognitive Concerns/ Orientation : A&Ox4  BEHAVIOR & AGGRESSION TOOL COLOR: Green  CIWA SCORE: NA    ABNL VS/O2: VSS on RA  MOBILITY: Assist x 1 with GB/walker.   PAIN MANAGMENT: PRN Oxycodone x 1 for abdominal pain  DIET: Low fiber. Good appetite. PRN Zofran x 1 for nausea.  BOWEL/BLADDER: Leger patent. Abdomen distended/taut. Had large loose stool x 3. Patient on Lactoluse.   ABNL LAB/BG: No labs today.   DRAIN/DEVICES:  Leger cath for retention; R PIV SL  TELEMETRY RHYTHM: NA  SKIN: Pale. Scattered scabs and bruises. Foam dressing on left arm CDI. Right abdominal paracentesis dressing CDI. Penis/linda area Red/rash, miconazole powder applied.   TESTS/PROCEDURES: Plan for IR Intraperitoneal Cath Tunnel for hospice placement on Monday; Kendra will consent with pt on Monday. NPO;  per order may have breakfast and then NPO after 0930 tomorrow.  D/C DATE:  Will have catheter placed on Monday and then transition to hospice. SW following.   OTHER IMPORTANT INFO: C- Diff, Enteric isolation maintained .

## 2021-08-16 NOTE — PLAN OF CARE
Cognitive Concerns/ Orientation : A&Ox4  BEHAVIOR & AGGRESSION TOOL COLOR: Green  CIWA SCORE: NA    ABNL VS/O2: VSS on RA  MOBILITY: Assist x 1 with GB/walker.   PAIN MANAGMENT: PRN Oxycodone x 1 for abdominal pain  DIET: Low fiber. Good appetite. BOWEL/BLADDER: Leger patent. Abdomen distended/taut. Had large loose stool x 1. Patient on Lactoluse.   ABNL LAB/BG: No labs today.   DRAIN/DEVICES:  Leger cath for retention; R PIV SL  TELEMETRY RHYTHM: NA  SKIN: Pale. Scattered scabs and bruises. Foam dressing on left arm CDI. Right abdominal paracentesis dressing CDI. Penis/linda area Red/rash, miconazole powder applied.   TESTS/PROCEDURES: Plan for IR Intraperitoneal Cath Tunnel for hospice placement on Monday; Kendra will consent with pt on Monday. NPO;  per order may have breakfast and then NPO after 0930 today  D/C DATE:  Will have catheter placed on Monday and then transition to hospice. SW following.   OTHER IMPORTANT INFO: C- Diff, Enteric isolation maintained .

## 2021-08-16 NOTE — PROCEDURES
[facility]    Procedure: IP drain    Date/Time: 8/16/2021 10:50 AM  Performed by: Kapil Cantu  Authorized by: Kapil Cantu     UNIVERSAL PROTOCOL   Site Marked: NA  Prior Images Obtained and Reviewed:  Yes  Required items: Required blood products, implants, devices and special equipment available    Patient identity confirmed:  Verbally with patient, arm band, provided demographic data and hospital-assigned identification number  Patient was reevaluated immediately before administering moderate or deep sedation or anesthesia  Confirmation Checklist:  Patient's identity using two indicators, relevant allergies, procedure was appropriate and matched the consent or emergent situation and correct equipment/implants were available  Time out: Immediately prior to the procedure a time out was called    Universal Protocol: the Joint Commission Universal Protocol was followed    Preparation: Patient was prepped and draped in usual sterile fashion           ANESTHESIA    Anesthesia: See MAR for details  Local Anesthetic:  Lidocaine 1% without epinephrine      SEDATION    Patient Sedated: Yes    Vital signs: Vital signs monitored during sedation    See dictated procedure note for full details.  Findings: Ascites drain placed RLQ    Specimens: none    Complications: None    Condition: Stable    Plan: May use catheter    PROCEDURE   Patient Tolerance:  Patient tolerated the procedure well with no immediate complications    Length of time physician/provider present for 1:1 monitoring during sedation: 15

## 2021-08-16 NOTE — PROGRESS NOTES
Care Management Follow Up    Length of Stay (days): 12    Expected Discharge Date: 08/17/2021     Concerns to be Addressed: adjustment to diagnosis/illness, discharge planning     Patient plan of care discussed at interdisciplinary rounds: Yes    Anticipated Discharge Disposition: Hospice     Anticipated Discharge Services:    Anticipated Discharge DME:      Patient/family educated on Medicare website which has current facility and service quality ratings: no  Education Provided on the Discharge Plan:    Patient/Family in Agreement with the Plan: yes    Referrals Placed by CM/SW: Hospice  Private pay costs discussed: Not applicable    Additional Information:    Per chart review several referrals have been sent.  Status of referrals are as follows:    Pending:    [facility] ( today)  [facility] ( today)   in [place] ( today)  [facility] (referral sent today)  [facility]  (referral sent today)  [facility]  (referral sent today)  [facility]  (referral sent today)  [facility]  (referral sent today)    Beds not available:    [facility] P[facility]    Declined:    [facility] to continue following for discharge planning needs.    Update:  [facility] is reviewing pt; bed is available this Friday, 8/20/21.          Earline Chan, TANASW

## 2021-08-16 NOTE — PLAN OF CARE
Alert and oriented x4. VSS. Oxycodone x1 for pain. Occasionally incontinent, jiang patent. Up with one and walker. Drain placed this morning and was drained downstairs, morning nurse to contact MD for continued drainage starting tomorrow. Plan to continue to monitor tonight likely discharge to hospice.

## 2021-08-16 NOTE — PRE-PROCEDURE
GENERAL PRE-PROCEDURE:   Procedure:  Tunneled abdominal catheter placement with intravenous moderate sedation   Date/Time:  8/16/2021 9:12 AM    Written consent obtained?: Yes    Risks and benefits: Risks, benefits and alternatives were discussed    Consent given by:  Patient  Patient states understanding of procedure being performed: Yes    Patient's understanding of procedure matches consent: Yes    Procedure consent matches procedure scheduled: Yes    Expected level of sedation:  Moderate  Appropriately NPO:  Yes  Mallampati  :  Grade 3- soft palate visible, posterior pharyngeal wall not visible  Lungs:  Lungs clear with good breath sounds bilaterally and other (comment)  Lung exam comment:  Decreased in bases   Heart:  Normal heart sounds and rate and systolic murmur  History & Physical reviewed:  History and physical reviewed and no updates needed  Statement of review:  I have reviewed the lab findings, diagnostic data, medications, and the plan for sedation

## 2021-08-17 ENCOUNTER — APPOINTMENT (OUTPATIENT)
Dept: GENERAL RADIOLOGY | Facility: CLINIC | Age: 66
DRG: 981 | End: 2021-08-17
Attending: HOSPITALIST
Payer: MEDICARE

## 2021-08-17 ENCOUNTER — DOCUMENTATION ONLY (OUTPATIENT)
Dept: OTHER | Facility: CLINIC | Age: 66
End: 2021-08-17

## 2021-08-17 PROCEDURE — 120N000001 HC R&B MED SURG/OB

## 2021-08-17 PROCEDURE — 74019 RADEX ABDOMEN 2 VIEWS: CPT

## 2021-08-17 PROCEDURE — 99233 SBSQ HOSP IP/OBS HIGH 50: CPT | Performed by: HOSPITALIST

## 2021-08-17 PROCEDURE — 250N000013 HC RX MED GY IP 250 OP 250 PS 637: Performed by: INTERNAL MEDICINE

## 2021-08-17 PROCEDURE — 250N000013 HC RX MED GY IP 250 OP 250 PS 637: Performed by: HOSPITALIST

## 2021-08-17 RX ORDER — OXYCODONE HYDROCHLORIDE 5 MG/1
5-10 TABLET ORAL EVERY 4 HOURS PRN
Status: DISCONTINUED | OUTPATIENT
Start: 2021-08-17 | End: 2021-08-19

## 2021-08-17 RX ADMIN — OXYCODONE HYDROCHLORIDE 10 MG: 5 TABLET ORAL at 22:06

## 2021-08-17 RX ADMIN — NICOTINE 1 PATCH: 14 PATCH, EXTENDED RELEASE TRANSDERMAL at 08:48

## 2021-08-17 RX ADMIN — MIRTAZAPINE 30 MG: 15 TABLET, FILM COATED ORAL at 21:41

## 2021-08-17 RX ADMIN — OXYCODONE HYDROCHLORIDE 10 MG: 5 TABLET ORAL at 13:05

## 2021-08-17 RX ADMIN — PANTOPRAZOLE SODIUM 40 MG: 40 TABLET, DELAYED RELEASE ORAL at 07:07

## 2021-08-17 RX ADMIN — LACTULOSE 20 G: 20 SOLUTION ORAL at 16:26

## 2021-08-17 RX ADMIN — TAMSULOSIN HYDROCHLORIDE 0.8 MG: 0.4 CAPSULE ORAL at 08:49

## 2021-08-17 RX ADMIN — OXYCODONE HYDROCHLORIDE 10 MG: 5 TABLET ORAL at 17:30

## 2021-08-17 RX ADMIN — QUETIAPINE FUMARATE 50 MG: 50 TABLET ORAL at 08:49

## 2021-08-17 RX ADMIN — OXYCODONE HYDROCHLORIDE 10 MG: 5 TABLET ORAL at 04:37

## 2021-08-17 RX ADMIN — FINASTERIDE 5 MG: 5 TABLET, FILM COATED ORAL at 08:49

## 2021-08-17 RX ADMIN — LACTULOSE 20 G: 20 SOLUTION ORAL at 08:50

## 2021-08-17 RX ADMIN — VORTIOXETINE 10 MG: 10 TABLET, FILM COATED ORAL at 08:49

## 2021-08-17 RX ADMIN — MICONAZOLE NITRATE: 20 POWDER TOPICAL at 21:45

## 2021-08-17 RX ADMIN — VORTIOXETINE 10 MG: 10 TABLET, FILM COATED ORAL at 21:41

## 2021-08-17 RX ADMIN — MICONAZOLE NITRATE: 20 POWDER TOPICAL at 08:53

## 2021-08-17 RX ADMIN — PANTOPRAZOLE SODIUM 40 MG: 40 TABLET, DELAYED RELEASE ORAL at 16:26

## 2021-08-17 ASSESSMENT — MIFFLIN-ST. JEOR: SCORE: 1466.64

## 2021-08-17 ASSESSMENT — ACTIVITIES OF DAILY LIVING (ADL)
ADLS_ACUITY_SCORE: 13
ADLS_ACUITY_SCORE: 14
ADLS_ACUITY_SCORE: 13

## 2021-08-17 NOTE — PLAN OF CARE
A&O, Forgetful, makes needs known. VSS on RA. Abd pain managed with Tylenol, oxycodone x1. Abd distended. Scattered bruising and scabbing. Leger patent. PleurX drainage kit at bedside. SW following for placement, continue to monitor.

## 2021-08-17 NOTE — PROGRESS NOTES
[facility]  Hospitalist Progress Note   08/17/2021          Assessment and Plan:       Jim Richard is a 66-year-old gentleman with alcohol abuse/dependence complicated by alcoholic liver disease with ascites, status post multiple paracentesis in the past, hepatorenal syndrome, chronic kidney disease stage IV, history of recent bacteremia due to Enterococcus, hypertension, COPD, tobacco use disorder, depression/anxiety, thrombocytopenia and obstructive sleep apnea admitted on 8/3/2021 with generalized weakness.    Status post tunneled abdominal catheter for recurrent ascites 8/16/2021.  Decompensated alcoholic liver cirrhosis with recurrent ascites  End-stage liver disease with portal hypertension, esophageal varices.  Acute on chronic kidney injury with possible hepatorenal syndrome with CKD stage IV  Patient has been having issues with recurrent ascites requiring multiple paracenteses, every 2 weeks. Presented with recurrent abdominal distention.  Paracentesis (8/4): Removed over 6 L.  Fluids not indicated of SBP. Consistent with protal HTN.  Paracentesis 8/9 removed 3.8 L of fluid- no cell count available  Paracentesis on 8/13 for comfort with removal of 2.3L  Meld score 23 on admission, followed by [place] [facility], not a candidate for liver transplant given poor compliance and continued consumption of alcohol.  Commended palliative evaluation.  Creatinine on admission 2.18 and has been trending up and 3.83 despite above treatment, nephrology recommended poor candidate for dialysis and given HRS status not treatment candidate  Received IV albumin, midodrine and octreotide, stopped since goal hospice at this time.    Underwent tunneled abdominal catheter for recurrent ascites 8/16/2021 by IR.  No immediate complication. Will need to drain approximately 2 L, about 3 times a week for comfort.    Goals of care  On 8/11 hospitalist team had discussed with patient regarding overall  prognosis, patient had then opted for enrolling in hospice.  Hospice meeting on 8/12 done--see consult note for details. SW assisting with hospice placement.  Patient agreeing for hospice at care facility.     IIeus  Patient with nausea and vomitting on 8/12- 8/13 . Abdominal pain intermittent sharp pain that is different from his usual abdominal pain. Abdominal Xray 8/13 showed moderate gaseous distention of large and small bowel most consistent with an adynamic ileus.   Patient was treated conservatively, on full liquid diet.  Complaining of abdominal distention and pain.  Receiving oral oxycodone as needed for pain, reports some relief of symptoms.  Continue full liquid diet for today, repeat abdominal x-ray for follow-up [of note has had catheter placement as above), patient in agreement.  General surgery signed off 8/14.  Dulcolax suppository prn     C Diff colitis.  Patient reported diarrhea for about a week prior to presentation.  C diff PCR positive in the ED  Has completed 10 days of Vanco on 8/13  Contact precautions per hospital protocol.    S/P Alcohol intoxication.  Alcohol dependence.   History of alcohol withdrawals  Depression/anxiety  Patient had a recent hospitalization at [facility] from where he was discharged to TCU.  After he returned back home, he started drinking again.  Admitted to drinking a pint of vodka daily, ethanol level 0.23 on admission.  Psychiatry recommended to follow-up with [facility] , follow-up with outpatient psychiatrist Dr. Flanagan and follow commitment recommendation.  Continue PTA medications: Mirtazapine, Trinellix.     Prolonged QTC  concern for polypharmacy.  Held Seroquel, Trazodone from admission (8/4) given prolonged QTC.  Have taken over patient's care on day 12 of hospitalization, has been stable off medication.  Opted for hospice care where QTC will not be monitored hence started on Seroquel 25 mg 3 times daily as needed for  anxiety or agitation.  Trazodone 25 mg at bedtime.  Stop vitamins given hospice    CKD stage IV.  Fluctuating creatinine at baseline between 2.5-3.  By nephrology during hospitalization, generally poor prognosis, not much to offer from renal standpoint.  As above transition to hospice.    Acute on chronic anemia, possible competent of GI bleed from esophageal varices.  Hemoglobin dropped to a fabiola of 6.9.  Status post 1 unit PRBC transfused 8/5  Hemoglobin has remained stable around 8 since transfusion  No further GI bleed.  No plans for endoscopy at this time per GI    No further blood draws at this time    Thrombocytopenia, chronic, related to his chronic liver disease  There is no evidence of active bleeding.  Platelet count on admission was 148.       Atypical chest pain. Resolved   Patient complained of chest pain since arrival to the ED.    Troponins negative.  EKG without evidence of acute ischemia     Physical deconditioning with Adult failure to thrive multifactorial from his chronic alcohol use, alcoholic liver cirrhosis, poor oral intake, and recent diarrhea.    Plan as above to discharge with hospice     Hypoglycemia, likely due to poor oral intake resolved  Blood sugars were in the 40s upon arrival in the ER, improved with a couple of doses of D50.     Tobacco use disorder  Nicotine patch had been ordered     Recent Enterococcus bacteremia  been treated with ampicillin IV for 2 weeks as per ID recommendations.       H/o BPH  Urinary retention  s/p jiang cath placement on 8/10.  PTA Flomax and Proscar  Continue jiang at discharge given hospice/for comfort purposes     History of COPD  This does not seem to be an acute issue at this time.    PTA inhalers      Obstructive sleep apnea   Noncompliant with CPAP.     Severe malnutrition  In the context of acute illness, chronic illness or disease  Supplements between meals per dietitian       Diet: Snacks/Supplements Adult: Ensure Enlive; Between Meals  DVT  Prophylaxis: Pneumatic Compression Devices  Leger Catheter: PRESENT, indication: Retention  Code Status: No CPR- Do NOT Intubate    Disposition: Expected discharge pending safe discharge plan in place.    Discussed with patient, bedside RN, care coordinator on the floor.  Total time greater than 35 minutes  More than 60% of time spent in direct patient care, care coordination, patient counseling, and formalizing plan of care.     Elena Chiang MD        Interval History:      Patient lying in bed.  Abdomen distended, had tunneled abdominal catheter placement yesterday.  Receiving oxycodone, reports some relief of pain.  Tolerating full liquid diet.  No nausea vomiting.  No headache or dizziness.  Reports having bowel movements, no blood.         Physical Exam:        Physical Exam   Temp:  [97.7  F (36.5  C)-99.1  F (37.3  C)] 99.1  F (37.3  C)  Pulse:  [65-73] 68  Resp:  [16-18] 18  BP: (101-131)/(52-72) 131/72  SpO2:  [94 %-97 %] 97 %    Intake/Output Summary (Last 24 hours) at 8/16/2021 0937  Last data filed at 8/16/2021 0918  Gross per 24 hour   Intake 540 ml   Output 1700 ml   Net -1160 ml       Admission Weight: 86.2 kg (190 lb)  Current Weight: 79.5 kg (175 lb 4.3 oz)    PHYSICAL EXAM  GENERAL: Patient is in no distress. able to answer simple questions.  HEART: Regular rate and rhythm. S1S2.  Systolic murmur right sternal border.  LUNGS: Bilateral decreased breath sounds. Respirations unlabored  ABDOMEN: Tense, distended, diffuse abdominal tenderness, bowel sounds sluggish.  Right upper quadrant catheter in place.  No guarding or rigidity.  NEURO: Moving all extremities.  Genitourinary Leger catheter in place.  EXTREMITIES: No pedal edema.  SKIN: Warm, dry. No rash  PSYCHIATRY Cooperative       Medications:          finasteride  5 mg Oral Daily     lactulose  20 g Oral TID     miconazole   Topical BID     mirtazapine  30 mg Oral At Bedtime     nicotine  1 patch Transdermal Daily     nicotine   Transdermal  Q8H     pantoprazole  40 mg Oral BID AC     tamsulosin  0.8 mg Oral Daily     vortioxetine  10 mg Oral BID     sodium chloride 0.9%, acetaminophen **OR** acetaminophen, albuterol, bisacodyl, glucose **OR** dextrose **OR** glucagon, flumazenil, fluticasone-vilanterol, heparin, heparin lock flush, hydrOXYzine, lidocaine 4%, melatonin, naloxone **OR** naloxone **OR** naloxone **OR** naloxone, ondansetron **OR** ondansetron, oxyCODONE, prochlorperazine **OR** prochlorperazine **OR** prochlorperazine, QUEtiapine, sodium chloride (PF)         Data:      All new lab and imaging data was reviewed.

## 2021-08-17 NOTE — PLAN OF CARE
Pt a/o a distended abdomen which begin to hurt after food more then full liquid. Moved back to full liquid and xray done.meds given with some relief.abdomen drain site has minimal serious sanguous drainage on dressing. Leger patent,up with 1 walker gait belt.rash on scrotum area powder put on. Awaiting hospice care.

## 2021-08-17 NOTE — PLAN OF CARE
VSS. Able to express needs. Compliant with cares. PRN Oxycodone for abdominal pain. PRN Seroquel per patient request. Will continue to monitor.

## 2021-08-17 NOTE — PROGRESS NOTES
SPIRITUAL HEALTH SERVICES Progress Note  [facility] 65    Follow-Up visit for HCD.    Jim showed me his HCD form that it had been signed by two witnesses, so it appears to no longer need a Notary. I informed [employer] [employer].    Upon inquiry, Jim said he was tired and did not want to talk at the time. I invited him to reach out to SH as desired and said a blessing.    SH remains available.    Andreina Cortez  Chaplain Resident

## 2021-08-17 NOTE — PROGRESS NOTES
Care Management Follow Up    Length of Stay (days): 13    Expected Discharge Date: 08/20/2021     Concerns to be Addressed: adjustment to diagnosis/illness, discharge planning     Patient plan of care discussed at interdisciplinary rounds: Yes    Anticipated Discharge Disposition: Hospice     Anticipated Discharge Services:    Anticipated Discharge DME:      Patient/family educated on Medicare website which has current facility and service quality ratings: no  Education Provided on the Discharge Plan:    Patient/Family in Agreement with the Plan: yes    Referrals Placed by CM/SW: Hospice  Private pay costs discussed: costs for care in a SNF or residential hospice    Additional Information:  Spoke with Stacey, patient's ex-wife today. She called asking how placement options are progressing.   Stacey is now patient's primary HCA and today she reports an  came today to the hospital and Jim signed paperwork giving Stacey Financial POA so she can help with finances.    Updated Stacey that we do not have a receiving care facility at this time.  Explained to her the [facility] declined patient for any of their facilities because he left the [facility].    A friend removed her parent from [place] due to care concerns so she doesn't want to consider [place].  [place] Stacey and she prefers to stay out of [place] as she will be visiting patient in the evening.  She prefers to use a facility which accepts MA in case patient's funds are depleted.    Will continue to make referrals tomorrow.      TANA CejaSW

## 2021-08-17 NOTE — PROGRESS NOTES
CLINICAL NUTRITION SERVICES - REASSESSMENT NOTE    Recommendations Ordered by Registered Dietitian (RD):   Continue Ensure BID between meals    Malnutrition: (8/9)   % Weight Loss:  > 5% in 1 month (severe malnutrition)  % Intake:  </= 50% for >/= 5 days (severe malnutrition)(most likely)  Subcutaneous Fat Loss:  Orbital region moderate depletion and Upper arm region severe depletion  Muscle Loss:  Temporal region moderate depletion and Clavicle bone region moderate-severe depletion  Fluid Retention:  Mild 1+ as above      Malnutrition Diagnosis: Severe malnutrition  In Context of:  Acute illness or injury  Chronic illness or disease  Environmental or social circumstances     EVALUATION OF PROGRESS TOWARD GOALS   Diet:  Low Fiber + Ensure BID between meals   Intake/Tolerance:    - Pt is eating 100% of meals the past few days. Occasionally misses a meal or eats only 50%.   - Appetite is good.   - Stooling daily.     ASSESSED NUTRITION NEEDS:  Dosing Weight 71.8 kg   Estimated Energy Needs: 1518-7442 kcals (25-30 Kcal/Kg)  Justification: maintenance  Estimated Protein Needs:  grams protein (1.2-1.5 g pro/Kg)  Justification: hypercatabolism with acute illness and CKD    NEW FINDINGS:   - Noted tentative plan to discharge on hospice     Previous Goals:   Intake of >75% meals TID + 1-2 supplements.   Evaluation: Met on average since last assessment on     Previous Nutrition Diagnosis:   Inadequate oral intake related to poor appetite, nausea, nutrient displacement by etoh prior to admission as evidenced by weight loss of up to 15% in 3 weeks, fat and muscle losses on exam  Evaluation: No change    CURRENT NUTRITION DIAGNOSIS  Inadequate oral intake related to poor appetite, nausea, nutrient displacement by etoh prior to admission as evidenced by weight loss of up to 15% in 3 weeks, fat and muscle losses on exam    INTERVENTIONS  Recommendations / Nutrition Prescription  Low Fiber Diet  Ensure Enlive BID between  meals     Implementation  None new    Goals  Intake of >75% meals + supplements.       MONITORING AND EVALUATION:  Progress towards goals will be monitored and evaluated per protocol and Practice Guidelines    Summer Isaac RD, LD  [facility] [place], 65, 54, [facility]   Pager: [phone]  Weekend Pager: [phone]

## 2021-08-18 LAB — SARS-COV-2 RNA RESP QL NAA+PROBE: NEGATIVE

## 2021-08-18 PROCEDURE — 87635 SARS-COV-2 COVID-19 AMP PRB: CPT | Performed by: HOSPITALIST

## 2021-08-18 PROCEDURE — 250N000013 HC RX MED GY IP 250 OP 250 PS 637: Performed by: INTERNAL MEDICINE

## 2021-08-18 PROCEDURE — 120N000001 HC R&B MED SURG/OB

## 2021-08-18 PROCEDURE — 250N000013 HC RX MED GY IP 250 OP 250 PS 637: Performed by: HOSPITALIST

## 2021-08-18 PROCEDURE — 99232 SBSQ HOSP IP/OBS MODERATE 35: CPT | Performed by: HOSPITALIST

## 2021-08-18 RX ORDER — BISACODYL 10 MG
10 SUPPOSITORY, RECTAL RECTAL ONCE
Status: DISCONTINUED | OUTPATIENT
Start: 2021-08-18 | End: 2021-08-18

## 2021-08-18 RX ORDER — BISACODYL 10 MG
10 SUPPOSITORY, RECTAL RECTAL ONCE
Status: COMPLETED | OUTPATIENT
Start: 2021-08-18 | End: 2021-08-18

## 2021-08-18 RX ADMIN — HYDROXYZINE HYDROCHLORIDE 50 MG: 25 TABLET ORAL at 21:43

## 2021-08-18 RX ADMIN — OXYCODONE HYDROCHLORIDE 10 MG: 5 TABLET ORAL at 21:33

## 2021-08-18 RX ADMIN — NICOTINE 1 PATCH: 14 PATCH, EXTENDED RELEASE TRANSDERMAL at 08:57

## 2021-08-18 RX ADMIN — MIRTAZAPINE 30 MG: 15 TABLET, FILM COATED ORAL at 21:33

## 2021-08-18 RX ADMIN — OXYCODONE HYDROCHLORIDE 10 MG: 5 TABLET ORAL at 02:05

## 2021-08-18 RX ADMIN — QUETIAPINE FUMARATE 50 MG: 50 TABLET ORAL at 00:56

## 2021-08-18 RX ADMIN — MICONAZOLE NITRATE: 20 POWDER TOPICAL at 08:58

## 2021-08-18 RX ADMIN — PANTOPRAZOLE SODIUM 40 MG: 40 TABLET, DELAYED RELEASE ORAL at 15:39

## 2021-08-18 RX ADMIN — VORTIOXETINE 10 MG: 10 TABLET, FILM COATED ORAL at 08:54

## 2021-08-18 RX ADMIN — QUETIAPINE FUMARATE 50 MG: 50 TABLET ORAL at 09:06

## 2021-08-18 RX ADMIN — QUETIAPINE FUMARATE 50 MG: 50 TABLET ORAL at 20:11

## 2021-08-18 RX ADMIN — FINASTERIDE 5 MG: 5 TABLET, FILM COATED ORAL at 08:54

## 2021-08-18 RX ADMIN — BISACODYL 10 MG: 10 SUPPOSITORY RECTAL at 17:36

## 2021-08-18 RX ADMIN — OXYCODONE HYDROCHLORIDE 10 MG: 5 TABLET ORAL at 09:06

## 2021-08-18 RX ADMIN — LACTULOSE 20 G: 20 SOLUTION ORAL at 08:54

## 2021-08-18 RX ADMIN — OXYCODONE HYDROCHLORIDE 10 MG: 5 TABLET ORAL at 13:12

## 2021-08-18 RX ADMIN — OXYCODONE HYDROCHLORIDE 10 MG: 5 TABLET ORAL at 17:14

## 2021-08-18 RX ADMIN — TAMSULOSIN HYDROCHLORIDE 0.8 MG: 0.4 CAPSULE ORAL at 08:54

## 2021-08-18 RX ADMIN — MICONAZOLE NITRATE: 20 POWDER TOPICAL at 20:11

## 2021-08-18 RX ADMIN — LACTULOSE 20 G: 20 SOLUTION ORAL at 15:39

## 2021-08-18 RX ADMIN — PANTOPRAZOLE SODIUM 40 MG: 40 TABLET, DELAYED RELEASE ORAL at 08:54

## 2021-08-18 RX ADMIN — VORTIOXETINE 10 MG: 10 TABLET, FILM COATED ORAL at 20:11

## 2021-08-18 ASSESSMENT — ACTIVITIES OF DAILY LIVING (ADL)
ADLS_ACUITY_SCORE: 13
ADLS_ACUITY_SCORE: 14

## 2021-08-18 NOTE — PLAN OF CARE
DATE & TIME: 8/17/21 0910-1035  Cognitive Concerns/ Orientation : A&Ox4  BEHAVIOR & AGGRESSION TOOL COLOR: Green  ABNL VS/O2: VSS on RA  MOBILITY: Assist x 1 with GB/walker.   PAIN MANAGMENT: PRN Oxycodonex2 for abdominal pain  DIET: Full liquid.    BOWEL/BLADDER: Leger patent. Abdomen distended/taut.  Incontinent of stool. Patient on Lactoluse.   ABNL LAB/BG: No labs today.   DRAIN/DEVICES:  Leger cath for retention; R PIV SL  TELEMETRY RHYTHM: NA  SKIN: Pale. Scattered scabs and bruises. Foam dressing on left arm CDI. Right abdominal paracentesis dressing CDI. Penis/linda area Red/rash, miconazole powder applied.   TESTS/PROCEDURES: abd x-ray completed today for increased pain  D/C DATE:  Will  transition to hospice. SW following.   OTHER IMPORTANT INFO: C- Diff, Enteric isolation maintained .

## 2021-08-18 NOTE — PLAN OF CARE
A&O x4. VSS on RA. C/o pain to abd. Managed with PRN Oxycodone. PleurX cath to RLQ. Accessed and 225cc of fluid drained this shift. Up SBA with walker. Full liquid diet. Tolerating well. PIV SL. LS dim in bases. BS active, + Flatus. Multiple BM this shift. Abd distended. 1 episode of emesis in evening. PRN suppository given per discussion with MD. Enteric precautions maintained. Leger patent with AUO. Progressing toward plan of care. Discharge to facility on Hospice pending.

## 2021-08-18 NOTE — PROGRESS NOTES
MD Notification    Notified Person: MD    Notified Person Name: Андрей    Notification Date/Time: 8/18/21    Notification Interaction: 1005    Purpose of Notification: Per NA and patient pt has had multiple loose stool this morning and yesterday. Could suppository be D/C?    Orders Received: Suppository discontinued    Comments:

## 2021-08-18 NOTE — PROGRESS NOTES
[facility]  Hospitalist Progress Note   08/18/2021          Assessment and Plan:       Jim Richard is a 66-year-old gentleman with alcohol abuse/dependence complicated by alcoholic liver disease with ascites, status post multiple paracentesis in the past, hepatorenal syndrome, chronic kidney disease stage IV, history of recent bacteremia due to Enterococcus, hypertension, COPD, tobacco use disorder, depression/anxiety, thrombocytopenia and obstructive sleep apnea admitted on 8/3/2021 with generalized weakness.    Status post tunneled abdominal catheter for recurrent ascites 8/16/2021.  Decompensated alcoholic liver cirrhosis with recurrent ascites  End-stage liver disease with portal hypertension, esophageal varices.  Acute on chronic kidney injury with possible hepatorenal syndrome with CKD stage IV  Patient has been having issues with recurrent ascites requiring multiple paracenteses, every 2 weeks. Presented with recurrent abdominal distention.  Paracentesis (8/4): Removed over 6 L.  Fluids not indicated of SBP. Consistent with protal HTN.  Paracentesis 8/9 removed 3.8 L of fluid- no cell count available  Paracentesis on 8/13 for comfort with removal of 2.3L  Meld score 23 on admission, followed by [place] [facility], not a candidate for liver transplant given poor compliance and continued consumption of alcohol.  Commended palliative evaluation.  Creatinine on admission 2.18 and has been trending up and 3.83 despite above treatment, nephrology recommended poor candidate for dialysis and given HRS status not treatment candidate  Received IV albumin, midodrine and octreotide, stopped since goal hospice at this time.    Underwent tunneled abdominal catheter for recurrent ascites 8/16/2021 by IR.  No immediate complication. Will need to drain approximately 2 L, about 3 times a week for comfort.  Will drain  fluid today given distension and discomfort superimposed on possible  ileus    Goals of care  On 8/11 hospitalist team had discussed with patient regarding overall prognosis, patient had then opted for enrolling in hospice.  Hospice meeting on 8/12 done--see consult note for details. SW assisting with hospice placement.  Patient agreeing for hospice at care facility.     IIeus  Patient with nausea and vomitting on 8/12- 8/13 . Abdominal pain intermittent sharp pain that is different from his usual abdominal pain. Abdominal Xray 8/13 showed moderate gaseous distention of large and small bowel most consistent with an adynamic ileus.   Patient was treated conservatively, on full liquid diet.  Complaining of abdominal distention and pain.  Receiving oral oxycodone as needed for pain, reports some relief of symptoms.  Continues to complain of abdominal pain, on 8/17 abdominal x-ray with multiple gas-filled colonic loops and multiple air-fluid levels in the right midabdomen, likely represent ileus versus small bowel obstruction.  Continue full liquid diet for today  General surgery signed off 8/14.   Dulcolax suppository x1 today.  If nausea vomiting will consider NG tube placement if patient agrees for decompression.     C Diff colitis.  Patient reported diarrhea for about a week prior to presentation.  C diff PCR positive in the ED  Has completed 10 days of Vanco on 8/13  Contact precautions per hospital protocol.    S/P Alcohol intoxication.  Alcohol dependence.   History of alcohol withdrawals  Depression/anxiety  Patient had a recent hospitalization at [facility] from where he was discharged to TCU.  After he returned back home, he started drinking again.  Admitted to drinking a pint of vodka daily, ethanol level 0.23 on admission.  Psychiatry recommended to follow-up with [facility] , follow-up with outpatient psychiatrist Dr. Flanagan and follow commitment recommendation.  Continue PTA medications: Mirtazapine, Trinellix.     Prolonged QTC  concern  for polypharmacy.  Held Seroquel, Trazodone from admission (8/4) given prolonged QTC.  Have taken over patient's care on day 12 of hospitalization, has been stable off medication.  Opted for hospice care where QTC will not be monitored hence started on Seroquel 25 mg 3 times daily as needed for anxiety or agitation.  Trazodone 25 mg at bedtime.  Stop vitamins given hospice    CKD stage IV.  Fluctuating creatinine at baseline between 2.5-3.  By nephrology during hospitalization, generally poor prognosis, not much to offer from renal standpoint.  As above transition to hospice.    Acute on chronic anemia, possible competent of GI bleed from esophageal varices.  Hemoglobin dropped to a fabiola of 6.9.  Status post 1 unit PRBC transfused 8/5  Hemoglobin has remained stable around 8 since transfusion  No further GI bleed.   Gastroenterology followed, no plans for endoscopy.  no further blood draws at this time    Thrombocytopenia, chronic, related to his chronic liver disease  There is no evidence of active bleeding.  Platelet count on admission was 148.       Atypical chest pain. Resolved   Patient complained of chest pain since arrival to the ED.    Troponins negative.  EKG without evidence of acute ischemia     Physical deconditioning with Adult failure to thrive multifactorial from his chronic alcohol use, alcoholic liver cirrhosis, poor oral intake, and recent diarrhea.    Plan as above to discharge with hospice     Hypoglycemia, likely due to poor oral intake resolved  Blood sugars were in the 40s upon arrival in the ER, improved with a couple of doses of D50.     Tobacco use disorder  Nicotine patch     Recent Enterococcus bacteremia  Had been treated with ampicillin IV for 2 weeks as per ID recommendations.       H/o BPH  Urinary retention S/p jiang cath placement on 8/10.  PTA Flomax and Proscar  Continue jiang at discharge given hospice/for comfort purposes     History of COPD  This does not seem to be an acute  issue at this time.    PTA inhalers      Obstructive sleep apnea   Noncompliant with CPAP.     Severe malnutrition  In the context of acute illness, chronic illness or disease  Supplements between meals per dietitian       Diet: Snacks/Supplements Adult: Ensure Enlive; Between Meals  DVT Prophylaxis: Pneumatic Compression Devices  Leger Catheter: PRESENT, indication: Retention  Code Status: No CPR- Do NOT Intubate    Disposition: Expected discharge pending safe discharge plan in place.    Discussed with patient, bedside RN  More than 60% of time spent in direct patient care, care coordination, patient counseling, and formalizing plan of care.     Elena Chiang MD        Interval History:      Patient lying in bed.  Abdomen distended  On scheduled lactulose, reports has been having bowel meds.  Receiving oxycodone, reports some relief of pain.  Tolerating full liquid diet.  No nausea vomiting.  No headache or dizziness.  Due for Covid testing today.         Physical Exam:        Physical Exam   Temp:  [98  F (36.7  C)-98.9  F (37.2  C)] 98.6  F (37  C)  Pulse:  [74-83] 83  Resp:  [16-18] 16  BP: (135-145)/(67-72) 145/67  SpO2:  [93 %-98 %] 93 %    Intake/Output Summary (Last 24 hours) at 8/16/2021 0937  Last data filed at 8/16/2021 0918  Gross per 24 hour   Intake 540 ml   Output 1700 ml   Net -1160 ml       Admission Weight: 86.2 kg (190 lb)  Current Weight: 79.5 kg (175 lb 4.3 oz)    PHYSICAL EXAM  GENERAL: Patient is in no distress. able to answer simple questions.  LUNGS: Bilateral decreased breath sounds. Respirations unlabored  ABDOMEN: Tense, distended, diffuse abdominal tenderness, bowel sounds sluggish.  Right upper quadrant catheter in place.  No guarding or rigidity.  NEURO: Moving all extremities.  Genitourinary Leger catheter in place.  EXTREMITIES: No pedal edema.  SKIN: Warm, dry. No rash  PSYCHIATRY Cooperative       Medications:          finasteride  5 mg Oral Daily     lactulose  20 g Oral TID      miconazole   Topical BID     mirtazapine  30 mg Oral At Bedtime     nicotine  1 patch Transdermal Daily     nicotine   Transdermal Q8H     pantoprazole  40 mg Oral BID AC     tamsulosin  0.8 mg Oral Daily     vortioxetine  10 mg Oral BID     sodium chloride 0.9%, acetaminophen **OR** acetaminophen, albuterol, bisacodyl, glucose **OR** dextrose **OR** glucagon, flumazenil, fluticasone-vilanterol, heparin, heparin lock flush, hydrOXYzine, lidocaine 4%, melatonin, naloxone **OR** naloxone **OR** naloxone **OR** naloxone, ondansetron **OR** ondansetron, oxyCODONE, prochlorperazine **OR** prochlorperazine **OR** prochlorperazine, QUEtiapine, sodium chloride (PF)         Data:      All new lab and imaging data was reviewed.

## 2021-08-18 NOTE — PROGRESS NOTES
Care Management Follow Up    Length of Stay (days): 14    Expected Discharge Date: 08/20/2021     Concerns to be Addressed: adjustment to diagnosis/illness, discharge planning     Patient plan of care discussed at interdisciplinary rounds: Yes    Anticipated Discharge Disposition: Hospice     Anticipated Discharge Services:    Anticipated Discharge DME:      Patient/family educated on Medicare website which has current facility and service quality ratings: no  Education Provided on the Discharge Plan:    Patient/Family in Agreement with the Plan: yes    Referrals Placed by CM/SW: Hospice  Private pay costs discussed: cost for daily charges at SNF with patient and ex-wife Stacey    Additional Information:  Additional SNF referrals made today. [facility] is assessing patient.     Also called [facility] called, per admissions they currently not accepting applications.  Omayra Angela who is patient's CM under patient's CD commitment is requesting to [facility] Attorney's office that  the commitment be closed since patient will be entering hospice and be residing in a care center.  This process usually take about two days.      TANA CejaSW

## 2021-08-18 NOTE — PLAN OF CARE
DATE & TIME: 8/17-8/18/21 Night shift  Cognitive Concerns/ Orientation : A&Ox4, sad and depressed  BEHAVIOR & AGGRESSION TOOL COLOR: Green  ABNL VS/O2: VSS on RA  MOBILITY: Assist x 1 with GB/walker.   PAIN MANAGMENT: PRN Oxycodonex1 for abdominal pain  DIET: Full liquid.    BOWEL/BLADDER: Leger patent. Abdomen distended/taut; no stool overnight  ABNL LAB/BG: No labs today.   DRAIN/DEVICES:  Leger cath for retention; R PIV SL  TELEMETRY RHYTHM: NA  SKIN: Pale. Scattered scabs and bruises. Foam dressing on left arm off, patient picking at all his scabs and refusing another dressing. Right abdominal paracentesis dressing CDI. Penis/linda area Red/rash, miconazole powder applied.   TESTS/PROCEDURES: abd x-ray completed yesterday for increase pain showed ileus vs SBO  D/C DATE:  Will  transition to hospice. SW following and looking for placement.   OTHER IMPORTANT INFO: C- Diff, Enteric isolation maintained .

## 2021-08-19 PROCEDURE — 250N000011 HC RX IP 250 OP 636: Performed by: HOSPITALIST

## 2021-08-19 PROCEDURE — 99232 SBSQ HOSP IP/OBS MODERATE 35: CPT | Performed by: SURGERY

## 2021-08-19 PROCEDURE — 250N000013 HC RX MED GY IP 250 OP 250 PS 637: Performed by: INTERNAL MEDICINE

## 2021-08-19 PROCEDURE — 250N000011 HC RX IP 250 OP 636: Performed by: INTERNAL MEDICINE

## 2021-08-19 PROCEDURE — 250N000013 HC RX MED GY IP 250 OP 250 PS 637: Performed by: HOSPITALIST

## 2021-08-19 PROCEDURE — 120N000001 HC R&B MED SURG/OB

## 2021-08-19 PROCEDURE — 99233 SBSQ HOSP IP/OBS HIGH 50: CPT | Performed by: HOSPITALIST

## 2021-08-19 PROCEDURE — 250N000013 HC RX MED GY IP 250 OP 250 PS 637: Performed by: SURGERY

## 2021-08-19 RX ORDER — HYDROMORPHONE HYDROCHLORIDE 1 MG/ML
.5-1 SOLUTION ORAL
Status: DISCONTINUED | OUTPATIENT
Start: 2021-08-19 | End: 2021-08-21

## 2021-08-19 RX ORDER — QUETIAPINE FUMARATE 25 MG/1
25 TABLET, FILM COATED ORAL 3 TIMES DAILY PRN
Status: DISCONTINUED | OUTPATIENT
Start: 2021-08-19 | End: 2021-08-19

## 2021-08-19 RX ORDER — NALOXONE HYDROCHLORIDE 0.4 MG/ML
0.1 INJECTION, SOLUTION INTRAMUSCULAR; INTRAVENOUS; SUBCUTANEOUS
Status: DISCONTINUED | OUTPATIENT
Start: 2021-08-19 | End: 2021-08-22 | Stop reason: HOSPADM

## 2021-08-19 RX ORDER — HYDROMORPHONE HYDROCHLORIDE 1 MG/ML
.3-.5 INJECTION, SOLUTION INTRAMUSCULAR; INTRAVENOUS; SUBCUTANEOUS
Status: DISCONTINUED | OUTPATIENT
Start: 2021-08-19 | End: 2021-08-22 | Stop reason: HOSPADM

## 2021-08-19 RX ORDER — MORPHINE SULFATE 10 MG/5ML
10 SOLUTION ORAL
Status: DISCONTINUED | OUTPATIENT
Start: 2021-08-19 | End: 2021-08-19

## 2021-08-19 RX ORDER — NALOXONE HYDROCHLORIDE 0.4 MG/ML
0.2 INJECTION, SOLUTION INTRAMUSCULAR; INTRAVENOUS; SUBCUTANEOUS
Status: DISCONTINUED | OUTPATIENT
Start: 2021-08-19 | End: 2021-08-22 | Stop reason: HOSPADM

## 2021-08-19 RX ORDER — ATROPINE SULFATE 10 MG/ML
2 SOLUTION/ DROPS OPHTHALMIC EVERY 4 HOURS PRN
Status: DISCONTINUED | OUTPATIENT
Start: 2021-08-19 | End: 2021-08-22 | Stop reason: HOSPADM

## 2021-08-19 RX ORDER — SIMETHICONE 40MG/0.6ML
40 SUSPENSION, DROPS(FINAL DOSAGE FORM)(ML) ORAL EVERY 6 HOURS PRN
Status: DISCONTINUED | OUTPATIENT
Start: 2021-08-19 | End: 2021-08-22 | Stop reason: HOSPADM

## 2021-08-19 RX ORDER — CARBOXYMETHYLCELLULOSE SODIUM 5 MG/ML
1-2 SOLUTION/ DROPS OPHTHALMIC
Status: DISCONTINUED | OUTPATIENT
Start: 2021-08-19 | End: 2021-08-22 | Stop reason: HOSPADM

## 2021-08-19 RX ORDER — BISACODYL 10 MG
10 SUPPOSITORY, RECTAL RECTAL ONCE
Status: DISCONTINUED | OUTPATIENT
Start: 2021-08-19 | End: 2021-08-21

## 2021-08-19 RX ORDER — LORAZEPAM 0.5 MG/1
0.25 TABLET ORAL EVERY 4 HOURS PRN
Status: DISCONTINUED | OUTPATIENT
Start: 2021-08-19 | End: 2021-08-22 | Stop reason: HOSPADM

## 2021-08-19 RX ORDER — LORAZEPAM 2 MG/ML
0.5 INJECTION INTRAMUSCULAR
Status: DISCONTINUED | OUTPATIENT
Start: 2021-08-19 | End: 2021-08-22 | Stop reason: HOSPADM

## 2021-08-19 RX ORDER — BISACODYL 10 MG
10 SUPPOSITORY, RECTAL RECTAL DAILY PRN
Status: DISCONTINUED | OUTPATIENT
Start: 2021-08-19 | End: 2021-08-22 | Stop reason: HOSPADM

## 2021-08-19 RX ADMIN — LACTULOSE 20 G: 20 SOLUTION ORAL at 08:41

## 2021-08-19 RX ADMIN — PANTOPRAZOLE SODIUM 40 MG: 40 TABLET, DELAYED RELEASE ORAL at 17:44

## 2021-08-19 RX ADMIN — VORTIOXETINE 10 MG: 10 TABLET, FILM COATED ORAL at 08:04

## 2021-08-19 RX ADMIN — PROCHLORPERAZINE MALEATE 5 MG: 5 TABLET ORAL at 12:34

## 2021-08-19 RX ADMIN — MICONAZOLE NITRATE: 20 POWDER TOPICAL at 21:40

## 2021-08-19 RX ADMIN — TAMSULOSIN HYDROCHLORIDE 0.8 MG: 0.4 CAPSULE ORAL at 08:04

## 2021-08-19 RX ADMIN — HYDROMORPHONE HYDROCHLORIDE 1 MG: 1 SOLUTION ORAL at 20:37

## 2021-08-19 RX ADMIN — LACTULOSE 20 G: 20 SOLUTION ORAL at 21:35

## 2021-08-19 RX ADMIN — OXYCODONE HYDROCHLORIDE 10 MG: 5 TABLET ORAL at 01:32

## 2021-08-19 RX ADMIN — MORPHINE SULFATE 10 MG: 10 SOLUTION ORAL at 15:06

## 2021-08-19 RX ADMIN — BISACODYL 10 MG: 10 SUPPOSITORY RECTAL at 14:47

## 2021-08-19 RX ADMIN — ONDANSETRON 4 MG: 2 INJECTION INTRAMUSCULAR; INTRAVENOUS at 08:47

## 2021-08-19 RX ADMIN — FINASTERIDE 5 MG: 5 TABLET, FILM COATED ORAL at 08:04

## 2021-08-19 RX ADMIN — SIMETHICONE 40 MG: 20 EMULSION ORAL at 14:47

## 2021-08-19 RX ADMIN — HYDROMORPHONE HYDROCHLORIDE 0.5 MG: 1 INJECTION, SOLUTION INTRAMUSCULAR; INTRAVENOUS; SUBCUTANEOUS at 18:32

## 2021-08-19 RX ADMIN — LORAZEPAM 0.25 MG: 0.5 TABLET ORAL at 21:35

## 2021-08-19 RX ADMIN — HYDROMORPHONE HYDROCHLORIDE 1 MG: 1 SOLUTION ORAL at 22:34

## 2021-08-19 RX ADMIN — OXYCODONE HYDROCHLORIDE 10 MG: 5 TABLET ORAL at 10:58

## 2021-08-19 RX ADMIN — NICOTINE 1 PATCH: 14 PATCH, EXTENDED RELEASE TRANSDERMAL at 08:04

## 2021-08-19 RX ADMIN — QUETIAPINE FUMARATE 50 MG: 50 TABLET ORAL at 03:12

## 2021-08-19 RX ADMIN — PANTOPRAZOLE SODIUM 40 MG: 40 TABLET, DELAYED RELEASE ORAL at 08:04

## 2021-08-19 RX ADMIN — LACTULOSE 20 G: 20 SOLUTION ORAL at 17:44

## 2021-08-19 RX ADMIN — MICONAZOLE NITRATE: 20 POWDER TOPICAL at 08:06

## 2021-08-19 RX ADMIN — QUETIAPINE FUMARATE 50 MG: 50 TABLET ORAL at 12:34

## 2021-08-19 ASSESSMENT — MIFFLIN-ST. JEOR: SCORE: 1466.19

## 2021-08-19 ASSESSMENT — ACTIVITIES OF DAILY LIVING (ADL)
ADLS_ACUITY_SCORE: 14
ADLS_ACUITY_SCORE: 16
ADLS_ACUITY_SCORE: 14

## 2021-08-19 NOTE — PLAN OF CARE
DATE & TIME: 8/18/21 1900 - 0730  Cognitive Concerns/ Orientation : A&Ox4, some word-finding difficulty  BEHAVIOR & AGGRESSION TOOL COLOR: Blunted affect at times but pleasant  ABNL VS/O2: VSS on RA  MOBILITY: Assist x 1 with GB/walker.   PAIN MANAGMENT: PRN oxy for abdominal pain  DIET: Full liquid.   BOWEL/BLADDER: Leger patent. Abdomen distended/taut; no stool overnight  ABNL LAB/BG: No labs today.   DRAIN/DEVICES:  Leger cath for retention; R PIV SL  TELEMETRY RHYTHM: NA  SKIN: Pale/lemuel. Scattered scabs and bruises. Foam dressing on left arm off, patient picking at all his scabs and refusing another dressing. Right abdominal paracentesis dressing CDI. Penis/linda area Red/rash, miconazole powder applied.   TESTS/PROCEDURES: abd x-ray completed 8/17 for increase pain showed ileus vs SBO  D/C DATE:  Will  transition to hospice. SW following and looking for placement.   OTHER IMPORTANT INFO: C- Diff, Enteric isolation maintained. Denies nausea this shift

## 2021-08-19 NOTE — PLAN OF CARE
Cognitive Concerns/ Orientation: A&Ox4, some word-finding difficulty. Flat affect  BEHAVIOR & AGGRESSION TOOL COLOR: green   ABNL VS/O2: VSS on RA  MOBILITY: Assist x 1 with GB/walker.   PAIN MANAGMENT: Gave Oxy for abdominal pain x1. Morphine given x1. Changed to dilaudid as he will take this at discharge  DIET: Full liquid-not tolerating this. Regualr diet for comfort  BOWEL/BLADDER: Leger patent. Abdomen distended/taut; one large loose stool  ABNL LAB/BG: No labs today.   DRAIN/DEVICES:  Leger cath for retention; R PIV SL  TELEMETRY RHYTHM: NA  SKIN: Scattered scabs and bruises. picking at all his scabs and refusing another dressing. Right abdominal paracentesis dressing changed. Penis/linda area. Red/rash, miconazole powder applied.   TESTS/PROCEDURES: abd x-ray completed 8/17 for increase pain showed ileus vs SBO  D/C DATE:  Will  transition to hospice. SW following and looking for placement.   OTHER IMPORTANT INFO: C- Diff, Enteric isolation maintained. Nauseated and dry heaving this shift-gave zofran and compazine. Took his tray of liquids away. Surgery saw patient -no surgical recommendation, placed him NPO, ordered simethicone and give suppository. Seroquel given per pt request for anxiety. Changed to comfort care this afternoon.

## 2021-08-19 NOTE — PROGRESS NOTES
[facility]  Hospitalist Progress Note   08/19/2021          Assessment and Plan:       Jim Richard is a 66-year-old gentleman with alcohol abuse/dependence complicated by alcoholic liver disease with ascites, status post multiple paracentesis in the past, hepatorenal syndrome, chronic kidney disease stage IV, history of recent bacteremia due to Enterococcus, hypertension, COPD, tobacco use disorder, depression/anxiety, thrombocytopenia and obstructive sleep apnea admitted on 8/3/2021 with generalized weakness.    Hospice care patient.  Goals of care  On 8/11 hospitalist team had discussed with patient regarding overall prognosis, patient had then opted for enrolling in hospice.  Hospice meeting on 8/12 done--see consult note for details.   Patient with acute ongoing medical issues, now with abdominal distention concern for ileus versus small bowel obstruction.  Patient in pain, little relief from oral oxycodone.  Patient reports this morning he would like to be comfortable and his time is up.  Patient understands increasing dose of pain medication can worsen ilues, cause drowsiness.  Patient also would like to eat solids, understands worsening ileus, bowel obstruction.  With patient's consent have discussed the same with patient's wife Zuleima over the telephone.  Would like to go ahead and respect patient's wishes of comfort care.  All oral medication discontinued, discussed with patient and family.  Oral Dilaudid, IV Dilaudid as needed for pain.  Oral/IV Ativan as needed for anxiety  IV/p.o. as needed Zofran.    Oral atropine drops.  Dulcolax as needed.  Regular diet.  Oxygen as needed for comfort.    Status post tunneled abdominal catheter for recurrent ascites 8/16/2021.  Decompensated alcoholic liver cirrhosis with recurrent ascites  End-stage liver disease with portal hypertension, esophageal varices.  Acute on chronic kidney injury with possible hepatorenal syndrome with CKD stage  IV  Patient has been having issues with recurrent ascites requiring multiple paracenteses, every 2 weeks. Presented with recurrent abdominal distention.  Paracentesis (8/4): Removed over 6 L. Fluids not indicated of SBP. Consistent with protal HTN.  Paracentesis 8/9 removed 3.8 L of fluid- no cell count available  Paracentesis on 8/13 for comfort with removal of 2.3L  Meld score 23 on admission, followed by [place] [facility], not a candidate for liver transplant given poor compliance and continued consumption of alcohol.  Commended palliative evaluation.  Creatinine on admission 2.18 and trended up to 3.83 despite above treatment, nephrology recommended poor candidate for dialysis and given HRS status not treatment candidate  Received IV albumin, midodrine and octreotide, stopped since goal hospice at this time.    Underwent tunneled abdominal catheter for recurrent ascites 8/16/2021 by IR.  No immediate complication. Drain approximately 2 L, about 3 times a week for comfort.  Only 225 mL of ascites fluid drained on 8/18 despite distended abdomen.     IIeus versus acute small bowel obstruction in the setting of C. difficile colitis. Next  Patient with nausea and vomitting on 8/12- 8/13 .  Abdominal pain intermittent sharp pain that is different from his usual abdominal pain.   Abdominal Xray 8/13 showed moderate gaseous distention of large and small bowel most consistent with an adynamic ileus.   Patient was treated conservatively, on full liquid diet. Continues to complain of distention with little relief from pain medication,  abdominal x-ray 8/17 with multiple gas-filled colonic loops and multiple air-fluid levels in the right midabdomen, likely represent ileus versus small bowel obstruction.  General surgery following, appreciate recommendation.  Declining NG tube, rectal tube at this time, at risk of bleeding.  Comfort cares as above.     C Diff colitis.  Patient reported diarrhea for about a week  prior to presentation.  C diff PCR positive in the ED  Has completed 10 days of Vanco on 8/13  Contact precautions per hospital protocol.    S/P Alcohol intoxication.  Alcohol dependence.   History of alcohol withdrawals  Depression/anxiety  Patient had a recent hospitalization at [facility] from where he was discharged to TCU.  After he returned back home, he started drinking again.  Admitted to drinking a pint of vodka daily, ethanol level 0.23 on admission.  Psychiatry recommended to follow-up with [facility] , follow-up with outpatient psychiatrist Dr. Flanagan and follow commitment recommendation.  Discontinue PTA medications: Mirtazapine, Trinellix while on comfort care.    Prolonged QTC  concern for polypharmacy.  Held Seroquel, Trazodone from admission (8/4) given prolonged QTC.  Have taken over patient's care on day 12 of hospitalization, has been stable off medication.  Opted for hospice care where QTC will not be monitored hence started on Seroquel 25 mg 3 times daily as needed for anxiety or agitation.  Discontinue PTA meds while on comfort care.    CKD stage IV.  Fluctuating creatinine at baseline between 2.5-3.  By nephrology during hospitalization, generally poor prognosis, not much to offer from renal standpoint.  As above transition to hospice.    Acute on chronic anemia, possible competent of GI bleed from esophageal varices.  Hemoglobin dropped to a fabiola of 6.9.  Status post 1 unit PRBC transfused 8/5  Hemoglobin has remained stable around 8 since transfusion  No further GI bleed.   Gastroenterology followed, no plans for endoscopy.  no further blood draws at this time    Thrombocytopenia, chronic, related to his chronic liver disease  There is no evidence of active bleeding.  Platelet count on admission was 148.       Atypical chest pain. Resolved   Patient complained of chest pain since arrival to the ED.    Troponins negative.  EKG without evidence of acute  ischemia     Physical deconditioning with Adult failure to thrive multifactorial from his chronic alcohol use, alcoholic liver cirrhosis, poor oral intake, and recent diarrhea.    Plan as above to discharge with hospice     Hypoglycemia, likely due to poor oral intake resolved  Blood sugars were in the 40s upon arrival in the ER, improved with a couple of doses of D50.     Tobacco use disorder  Nicotine patch     Recent Enterococcus bacteremia  Had been treated with ampicillin IV for 2 weeks as per ID recommendations.       H/o BPH  Urinary retention S/p jiang cath placement on 8/10.  Discontinue PTA Flomax and Proscar while on comfort care.  Continue jiang at discharge given hospice/for comfort purposes     History of COPD  This does not seem to be an acute issue at this time.    PTA inhalers      Obstructive sleep apnea   Noncompliant with CPAP.     Severe malnutrition  In the context of acute illness, chronic illness or disease  Supplements between meals per dietitian       Diet: Snacks/Supplements Adult: Ensure Enlive; Between Meals  DVT Prophylaxis: Pneumatic Compression Devices  Jiang Catheter: PRESENT, indication: Retention  Code Status: No CPR- Do NOT Intubate    Disposition: Expected discharge pending safe discharge plan in place, clinical progress over the next 24 hours.    Discussed with patient, his ex-spouse Zuleima over the telephone, bedside RN.  Multiple questions for patient and family answered.  Total time greater than 65 minutes. More than 60% of time spent in direct patient care, care coordination, patient/caregiver counseling, and formalizing plan of care.   Content of prolonged care - acute ileus vs SBO, goals of care discussion.    Elena Chiang MD        Interval History:      Patient lying in bed.  Alert and awake.  Abdomen distended and complaining of pain. Receiving oxycodone with little relief of pain.  Nausea ongoing, no bowel movement since this morning.  On scheduled lactulose.    Taking full liquid diet, nausea present.  No headache or dizziness.       Physical Exam:        Physical Exam   Temp:  [98.3  F (36.8  C)-98.5  F (36.9  C)] 98.3  F (36.8  C)  Pulse:  [68-82] 68  Resp:  [16-18] 18  BP: (126-152)/(68-74) 126/68  SpO2:  [95 %-99 %] 95 %    Intake/Output Summary (Last 24 hours) at 8/16/2021 0937  Last data filed at 8/16/2021 0918  Gross per 24 hour   Intake 540 ml   Output 1700 ml   Net -1160 ml       Admission Weight: 86.2 kg (190 lb)  Current Weight: 79.5 kg (175 lb 4.3 oz)    PHYSICAL EXAM  GENERAL: Patient is in no distress. able to answer simple questions.  LUNGS: Bilateral decreased breath sounds. Respirations unlabored  ABDOMEN: Tense, distended, diffuse abdominal tenderness, bowel sounds sluggish.  Right upper quadrant catheter in place.  No guarding or rigidity.  NEURO: Moving all extremities.  Genitourinary Leger catheter in place.  EXTREMITIES: No pedal edema.  SKIN: Warm, dry. No rash  PSYCHIATRY Cooperative       Medications:          finasteride  5 mg Oral Daily     lactulose  20 g Oral TID     miconazole   Topical BID     mirtazapine  30 mg Oral At Bedtime     nicotine  1 patch Transdermal Daily     nicotine   Transdermal Q8H     pantoprazole  40 mg Oral BID AC     tamsulosin  0.8 mg Oral Daily     vortioxetine  10 mg Oral BID     sodium chloride 0.9%, acetaminophen **OR** acetaminophen, albuterol, bisacodyl, bisacodyl, glucose **OR** dextrose **OR** glucagon, flumazenil, fluticasone-vilanterol, heparin, heparin lock flush, hydrOXYzine, lidocaine 4%, melatonin, naloxone **OR** naloxone **OR** naloxone **OR** naloxone, ondansetron **OR** ondansetron, oxyCODONE, prochlorperazine **OR** prochlorperazine **OR** prochlorperazine, QUEtiapine, simethicone, sodium chloride (PF)         Data:      All new lab and imaging data was reviewed.

## 2021-08-19 NOTE — PROGRESS NOTES
Care Management Follow Up    Length of Stay (days): 15    Expected Discharge Date: 08/20/2021     Concerns to be Addressed: adjustment to diagnosis/illness, discharge planning     Patient plan of care discussed at interdisciplinary rounds: Yes    Anticipated Discharge Disposition: Hospice     Anticipated Discharge Services:    Anticipated Discharge DME:      Patient/family educated on Medicare website which has current facility and service quality ratings: no  Education Provided on the Discharge Plan:    Patient/Family in Agreement with the Plan: yes    Referrals Placed by CM/SW: Hospice  Private pay costs discussed:     Additional Information:  Patient has been offered a private room on the hospice wing at [facility]. The liaison RN, Padmini, explains  that [facility] is their preferred provider and requests [facility]  be the hospice provider if family accepts.   Writer contacted ex-spouse who is also patient's [facility] to explain [facility] can offer patient admission.  She would like to tour the facility but cannot do this till Friday afternoon.    It is writers understanding patient is now experiencing abdominal discomfort likely related to ileus and is now not stable for discharge.  Given this, Stacey has time to tour the facility tomorrow.   Stacey is also asking about the estimated cost for daily care at [facility].  Writer explained the daily cost will likely range between $400 -$500.00 however the liaison will ask the facility business office and get back to writer with average daily rate and what type of payment is due at time of admission.  Plan:  Will follow        NIKKI Ceja

## 2021-08-19 NOTE — PROGRESS NOTES
General surgery note    Mr. Richard has developed what appears to be an ileus, we have been asked to see him with regards to this.  He recently underwent an uncomplicated tunnel peritoneal catheter tube placement to manage his ascites.  His last bowel movement was yesterday, he has been passing flatus since.  Abdominal x-rays revealed multiple dilated loops of bowel consistent with ileus.  His abdomen is soft, he does not have any pain with the exception of the catheter insertion site.  His umbilical hernia soft likely containing only ascites as no tissue nor borborygmus can be palpated.    Multifactorial ileus  Nonsurgical management  We will add simethicone to help with some of the gas, will use suppository as he is used to doing this for his bowel movements, will make him n.p.o. except for meds and ice chips.  Encourage ambulation.        Total encounter time 35 minutes, more than half spent in review of data and counseling.

## 2021-08-19 NOTE — PROGRESS NOTES
Call placed to Dr Ibarra to follow up on ileus patient has increased Nausea and abd pain. Office said Dr Ibarra is in surgery and they will have her call us when she is done.

## 2021-08-20 PROCEDURE — 250N000013 HC RX MED GY IP 250 OP 250 PS 637: Performed by: INTERNAL MEDICINE

## 2021-08-20 PROCEDURE — 250N000013 HC RX MED GY IP 250 OP 250 PS 637: Performed by: HOSPITALIST

## 2021-08-20 PROCEDURE — 250N000009 HC RX 250: Performed by: HOSPITALIST

## 2021-08-20 PROCEDURE — 99232 SBSQ HOSP IP/OBS MODERATE 35: CPT | Performed by: HOSPITALIST

## 2021-08-20 PROCEDURE — 250N000011 HC RX IP 250 OP 636: Performed by: INTERNAL MEDICINE

## 2021-08-20 PROCEDURE — 120N000001 HC R&B MED SURG/OB

## 2021-08-20 RX ORDER — QUETIAPINE FUMARATE 50 MG/1
50 TABLET, FILM COATED ORAL AT BEDTIME
Status: DISCONTINUED | OUTPATIENT
Start: 2021-08-20 | End: 2021-08-22 | Stop reason: HOSPADM

## 2021-08-20 RX ORDER — LIDOCAINE HYDROCHLORIDE 20 MG/ML
JELLY TOPICAL EVERY 4 HOURS PRN
Status: DISCONTINUED | OUTPATIENT
Start: 2021-08-20 | End: 2021-08-22 | Stop reason: HOSPADM

## 2021-08-20 RX ORDER — LIDOCAINE HYDROCHLORIDE 20 MG/ML
JELLY TOPICAL ONCE
Status: COMPLETED | OUTPATIENT
Start: 2021-08-20 | End: 2021-08-20

## 2021-08-20 RX ADMIN — HYDROMORPHONE HYDROCHLORIDE 1 MG: 1 SOLUTION ORAL at 20:57

## 2021-08-20 RX ADMIN — HYDROMORPHONE HYDROCHLORIDE 1 MG: 1 SOLUTION ORAL at 00:31

## 2021-08-20 RX ADMIN — PANTOPRAZOLE SODIUM 40 MG: 40 TABLET, DELAYED RELEASE ORAL at 07:58

## 2021-08-20 RX ADMIN — ONDANSETRON 4 MG: 2 INJECTION INTRAMUSCULAR; INTRAVENOUS at 17:35

## 2021-08-20 RX ADMIN — HYDROMORPHONE HYDROCHLORIDE 1 MG: 1 SOLUTION ORAL at 13:54

## 2021-08-20 RX ADMIN — LORAZEPAM 0.25 MG: 0.5 TABLET ORAL at 23:41

## 2021-08-20 RX ADMIN — LACTULOSE 20 G: 20 SOLUTION ORAL at 16:23

## 2021-08-20 RX ADMIN — PANTOPRAZOLE SODIUM 40 MG: 40 TABLET, DELAYED RELEASE ORAL at 16:23

## 2021-08-20 RX ADMIN — LIDOCAINE HYDROCHLORIDE: 20 JELLY TOPICAL at 16:23

## 2021-08-20 RX ADMIN — HYDROMORPHONE HYDROCHLORIDE 1 MG: 1 SOLUTION ORAL at 11:46

## 2021-08-20 RX ADMIN — LACTULOSE 20 G: 20 SOLUTION ORAL at 21:02

## 2021-08-20 RX ADMIN — MICONAZOLE NITRATE: 20 POWDER TOPICAL at 08:00

## 2021-08-20 RX ADMIN — HYDROMORPHONE HYDROCHLORIDE 1 MG: 1 SOLUTION ORAL at 18:50

## 2021-08-20 RX ADMIN — HYDROMORPHONE HYDROCHLORIDE 1 MG: 1 SOLUTION ORAL at 04:33

## 2021-08-20 RX ADMIN — LIDOCAINE HYDROCHLORIDE: 20 JELLY TOPICAL at 02:32

## 2021-08-20 RX ADMIN — LORAZEPAM 0.25 MG: 0.5 TABLET ORAL at 03:27

## 2021-08-20 RX ADMIN — LIDOCAINE HYDROCHLORIDE: 20 JELLY TOPICAL at 20:58

## 2021-08-20 RX ADMIN — MICONAZOLE NITRATE: 20 POWDER TOPICAL at 20:59

## 2021-08-20 RX ADMIN — HYDROMORPHONE HYDROCHLORIDE 1 MG: 1 SOLUTION ORAL at 06:44

## 2021-08-20 RX ADMIN — HYDROMORPHONE HYDROCHLORIDE 1 MG: 1 SOLUTION ORAL at 16:23

## 2021-08-20 RX ADMIN — HYDROMORPHONE HYDROCHLORIDE 1 MG: 1 SOLUTION ORAL at 09:37

## 2021-08-20 RX ADMIN — QUETIAPINE FUMARATE 50 MG: 50 TABLET ORAL at 21:02

## 2021-08-20 RX ADMIN — HYDROMORPHONE HYDROCHLORIDE 1 MG: 1 SOLUTION ORAL at 02:32

## 2021-08-20 RX ADMIN — NICOTINE 1 PATCH: 14 PATCH, EXTENDED RELEASE TRANSDERMAL at 07:58

## 2021-08-20 RX ADMIN — HYDROMORPHONE HYDROCHLORIDE 1 MG: 1 SOLUTION ORAL at 23:27

## 2021-08-20 RX ADMIN — LACTULOSE 20 G: 20 SOLUTION ORAL at 08:07

## 2021-08-20 ASSESSMENT — ACTIVITIES OF DAILY LIVING (ADL)
ADLS_ACUITY_SCORE: 15
ADLS_ACUITY_SCORE: 16
ADLS_ACUITY_SCORE: 15
ADLS_ACUITY_SCORE: 15

## 2021-08-20 NOTE — PLAN OF CARE
8/19/21 7060 - 5151  COMFORT CARES  Cognitive Concerns/ Orientation: A&Ox4, some word-finding difficulty. Flat affect  BEHAVIOR & AGGRESSION TOOL COLOR: green   ABNL VS/O2: vitals d/c  MOBILITY: Assist x 1 with GB/walker.   PAIN MANAGMENT: Oral dilaudid q2hr for abdominal pain; topical lidocaine gel applied to jiang catheter site  DIET: Regualr diet for comfort  BOWEL/BLADDER: Jiang patent. Abdomen distended/taut. One large loose stool this shift  ABNL LAB/BG: none  DRAIN/DEVICES:  Jiang cath for retention; R PIV SL  TELEMETRY RHYTHM: NA  SKIN: Scattered scabs and bruises. picking at all his scabs and refusing another dressing. Right abdominal drain CDI. Penis/linda area. Red/rash, miconazole powder applied. New sore to tip of penis, AGUILA  TESTS/PROCEDURES: abd x-ray completed 8/17 for increase pain showed ileus vs SBO  D/C DATE:  Will  transition to hospice. SW following and looking for placement.   OTHER IMPORTANT INFO: C- Diff, Enteric isolation maintained. Surgery saw patient -no surgical recommendation. Changed to comfort cares.

## 2021-08-20 NOTE — PLAN OF CARE
8/20/21 [id number]  COMFORT CARES  Cognitive Concerns/ Orientation: A&Ox4, some word-finding difficulty. Flat affect  BEHAVIOR & AGGRESSION TOOL COLOR: green   ABNL VS/O2: vitals d/c  MOBILITY: Assist x 1 with GB/walker.   PAIN MANAGMENT: Oral dilaudid q2hr for abdominal pain; pain at tip of penis from catheter-topical lido q4h prn started  DIET: Regualr diet for comfort  BOWEL/BLADDER: Leger patent-has for retention. Abdomen distended/taut.  ABNL LAB/BG: none  DRAIN/DEVICES:  Leger cath for retention; R PIV SL  TELEMETRY RHYTHM: NA  SKIN: Scattered scabs and bruises. picking at all his scabs and refusing another dressing. Right abdominal drain leaking, IR updated, recommended to drain PleurX today and apply new dressing and see if leaking stops. 450ml output from PleurX.  Penis/linda area. Red/rash, miconazole powder applied. New sore to tip of penis, AGUILA  TESTS/PROCEDURES: abd x-ray completed 8/17 for increase pain showed ileus vs SBO  D/C DATE:  Will  transition to hospice. SW following and possible discharge tomorrow  OTHER IMPORTANT INFO: C- Diff, Enteric isolation maintained. If PleurX continues to leak, will notify IR tomorrow and they may place a stitch. Zofran x1 for nausea.

## 2021-08-20 NOTE — PROGRESS NOTES
Care Management Follow Up    Length of Stay (days): 16    Expected Discharge Date: 08/21/2021     Concerns to be Addressed: adjustment to diagnosis/illness, discharge planning     Patient plan of care discussed at interdisciplinary rounds: Yes    Anticipated Discharge Disposition: Hospice     Anticipated Discharge Services:    Anticipated Discharge DME:      Patient/family educated on Medicare website which has current facility and service quality ratings: no  Education Provided on the Discharge Plan:    Patient/Family in Agreement with the Plan: yes    Referrals Placed by CM/SW: Hospice  Private pay costs discussed: cost for care at [facility]    Additional Information:  Stacey, patient's ex-wife and [facility] is touring [facility] later afternoon.  If she accepts this option, carmen Porras and [facility] admission liaision Juan are prepared to accept patient tomorrow.  Padmini can be reached at [phone] and Philippe at [phone].  Philippe confirms their hospice RN's will manage the care of the pleur-x and make visits as needed to the [facility].  Reviewed this option with Dr Chiang.    NIKKI Ceja

## 2021-08-20 NOTE — PROGRESS NOTES
[facility]  Hospitalist Progress Note   08/20/2021          Assessment and Plan:       Jim Richard is a 66-year-old gentleman with alcohol abuse/dependence complicated by alcoholic liver disease with ascites, status post multiple paracentesis in the past, hepatorenal syndrome, chronic kidney disease stage IV, history of recent bacteremia due to Enterococcus, hypertension, COPD, tobacco use disorder, depression/anxiety, thrombocytopenia and obstructive sleep apnea admitted on 8/3/2021 with generalized weakness.    Hospice care patient.  Goals of care  On 8/11 hospitalist team had discussed with patient regarding overall prognosis, patient had then opted for enrolling in hospice.  Hospice meeting on 8/12 done--see consult note for details.   On 8/19 patient with progressively worsening abdominal distention, concern for ileu versus small bowel obstruction.  Discussed with patient, his ex-wife Stacey over the telephone with patient's consent.  They had opted for comfort care.  Oral Dilaudid, IV Dilaudid as needed for pain.  Oral/IV Ativan as needed for anxiety  IV/p.o. as needed Zofran.    Oral atropine drops.  Dulcolax as needed.  Regular diet.  Oxygen as needed for comfort.    Status post tunneled abdominal catheter for recurrent ascites 8/16/2021.  Decompensated alcoholic liver cirrhosis with recurrent ascites  End-stage liver disease with portal hypertension, esophageal varices.  Acute on chronic kidney injury with possible hepatorenal syndrome with CKD stage IV  Patient has been having issues with recurrent ascites requiring multiple paracenteses, every 2 weeks. Presented with recurrent abdominal distention.  Paracentesis (8/4): Removed over 6 L. Fluids not indicated of SBP. Consistent with protal HTN.  Paracentesis 8/9 removed 3.8 L of fluid- no cell count available  Paracentesis on 8/13 for comfort with removal of 2.3L  Meld score 23 on admission, followed by [place] [facility], [facility]  a candidate for liver transplant given poor compliance and continued consumption of alcohol.  Commended palliative evaluation.  Creatinine on admission 2.18 and trended up to 3.83 despite above treatment, nephrology recommended poor candidate for dialysis and given HRS status not treatment candidate  Received IV albumin, midodrine and octreotide, stopped since goal hospice at this time.    Underwent tunneled abdominal catheter for recurrent ascites 8/16/2021 by IR.  No immediate complication. Drain approximately 2 L, about 3 times a week for comfort.  Only 225 mL of ascites fluid drained on 8/18 despite distended abdomen.  Willl likely drain fluid tomorrow.     IIeus versus acute small bowel obstruction in the setting of C. difficile colitis. Next  Patient with nausea and vomitting on 8/12- 8/13 .  Abdominal pain intermittent sharp pain that is different from his usual abdominal pain.   Abdominal Xray 8/13 showed moderate gaseous distention of large and small bowel most consistent with an adynamic ileus.   Patient was treated conservatively, on full liquid diet. Continues to complain of distention with little relief from pain medication,  abdominal x-ray 8/17 with multiple gas-filled colonic loops and multiple air-fluid levels in the right midabdomen, likely represent ileus versus small bowel obstruction.  General surgery signed off, appreciate recommendation.  Declined NG tube, rectal tube at this time, at risk of bleeding.  Comfort cares as above.     C Diff colitis.  Patient reported diarrhea for about a week prior to presentation.    C diff PCR positive in the ED. Has completed 10 days of Vanco on 8/13  Contact precautions per hospital protocol.    S/P Alcohol intoxication.  Alcohol dependence.   History of alcohol withdrawals  Depression/anxiety  Patient had a recent hospitalization at [facility] from where he was discharged to TCU.  After he returned back home, he started drinking again.   Admitted to drinking a pint of vodka daily, ethanol level 0.23 on admission.  Psychiatry recommended to follow-up with [facility] , follow-up with outpatient psychiatrist Dr. Flanagan and follow commitment recommendation.  Discontinue PTA medications: Mirtazapine, Trinellix while on comfort care, ileus and concern for bowel obstruction.  Restart Seroquel at bedtime per patient request.    Prolonged QTC  concern for polypharmacy.  Held Seroquel, Trazodone from admission (8/4) given prolonged QTC.  Have taken over patient's care on day 12 of hospitalization, has been stable off medication.  Opted for hospice care where QTC will not be monitored hence started on Seroquel 25 mg 3 times daily as needed for anxiety or agitation.  Discontinue PTA meds while on comfort care, ileus and concern for bowel obstruction    CKD stage IV.  Fluctuating creatinine at baseline between 2.5-3.  By nephrology during hospitalization, generally poor prognosis, not much to offer from renal standpoint.  As above transition to hospice.    Acute on chronic anemia, possible competent of GI bleed from esophageal varices.  Hemoglobin dropped to a fabiola of 6.9.  Status post 1 unit PRBC transfused 8/5  Hemoglobin has remained stable around 8 since transfusion  No further GI bleed. Gastroenterology followed, no plans for endoscopy.  no further blood draws at this time    Thrombocytopenia, chronic, related to his chronic liver disease  There is no evidence of active bleeding.  Platelet count on admission was 148.       Atypical chest pain. Resolved   Patient complained of chest pain since arrival to the ED.    Troponins negative.  EKG without evidence of acute ischemia     Physical deconditioning with Adult failure to thrive multifactorial from his chronic alcohol use, alcoholic liver cirrhosis, poor oral intake, and recent diarrhea.    Plan as above to discharge with hospice     Hypoglycemia, likely due to poor oral intake  resolved  Blood sugars were in the 40s upon arrival in the ER, improved with a couple of doses of D50.     Tobacco use disorder  Nicotine patch     Recent Enterococcus bacteremia  Had been treated with ampicillin IV for 2 weeks as per ID recommendations.       H/o BPH  Urinary retention S/p jiang cath placement on 8/10.  Discontinue PTA Flomax and Proscar while on comfort care and jiang in.  Continue jiang at discharge given hospice/for comfort purposes     History of COPD  This does not seem to be an acute issue at this time.    PTA inhalers      Obstructive sleep apnea   Noncompliant with CPAP.     Severe malnutrition  In the context of acute illness, chronic illness or disease  Supplements between meals per dietitian       Diet: Snacks/Supplements Adult: Ensure Enlive; Between Meals  DVT Prophylaxis: Pneumatic Compression Devices  Jiang Catheter: PRESENT, indication: Retention  Code Status: No CPR- Do NOT Intubate    Disposition: Expected discharge pending safe discharge plan in place, clinical progress over the next 24 hours.    Discussed with patient, bedside RN, care coordinator, .  Patient's ex-wife Stacey updated 8/19.   More than 60% of time spent in direct patient care, care coordination, patient/caregiver counseling, and formalizing plan of care.     Elena Chiang MD        Interval History:      Patient lying in bed.  Alert and awake.  Abdomen distended and admitted pain relieved with Dilaudid.  Nausea improving.  Able to tolerate diet small amounts.  Reports had bowel movements yesterday, none this morning.  Continues to take scheduled lactulose.         Physical Exam:        Physical Exam   Resp:  [16-18] 18    Intake/Output Summary (Last 24 hours) at 8/16/2021 0937  Last data filed at 8/16/2021 0918  Gross per 24 hour   Intake 540 ml   Output 1700 ml   Net -1160 ml       Admission Weight: 86.2 kg (190 lb)  Current Weight: 79.5 kg (175 lb 4.3 oz)    PHYSICAL EXAM  GENERAL: Patient is  in no distress. able to answer simple questions.  LUNGS: Bilateral decreased breath sounds. Respirations unlabored  ABDOMEN: Tense, distended, diffuse abdominal tenderness, bowel sounds sluggish.  Right upper quadrant catheter in place.  No guarding or rigidity.  NEURO: Moving all extremities.  Genitourinary Leger catheter in place.  EXTREMITIES: No pedal edema.  SKIN: Warm, dry. No rash  PSYCHIATRY Cooperative       Medications:          bisacodyl  10 mg Rectal Once     lactulose  20 g Oral TID     miconazole   Topical BID     nicotine  1 patch Transdermal Daily     nicotine   Transdermal Q8H     pantoprazole  40 mg Oral BID AC     QUEtiapine  50 mg Oral At Bedtime     acetaminophen **OR** acetaminophen, albuterol, atropine, bisacodyl, bisacodyl, artificial tears ophthalmic solution, glucose **OR** dextrose **OR** glucagon, flumazenil, heparin, heparin lock flush, HYDROmorphone, HYDROmorphone (STANDARD CONC), lidocaine 4%, LORazepam **OR** LORazepam, melatonin, naloxone, naloxone, naloxone **OR** naloxone **OR** naloxone **OR** naloxone, ondansetron **OR** ondansetron, prochlorperazine **OR** prochlorperazine **OR** prochlorperazine, simethicone, sodium chloride (PF)         Data:      All new lab and imaging data was reviewed.

## 2021-08-21 ENCOUNTER — APPOINTMENT (OUTPATIENT)
Dept: INTERVENTIONAL RADIOLOGY/VASCULAR | Facility: CLINIC | Age: 66
DRG: 981 | End: 2021-08-21
Attending: HOSPITALIST
Payer: MEDICARE

## 2021-08-21 PROCEDURE — 272N000500 HC NEEDLE CR2

## 2021-08-21 PROCEDURE — 250N000013 HC RX MED GY IP 250 OP 250 PS 637: Performed by: SURGERY

## 2021-08-21 PROCEDURE — 250N000011 HC RX IP 250 OP 636: Performed by: INTERNAL MEDICINE

## 2021-08-21 PROCEDURE — 49424 ASSESS CYST CONTRAST INJECT: CPT

## 2021-08-21 PROCEDURE — 250N000009 HC RX 250: Performed by: RADIOLOGY

## 2021-08-21 PROCEDURE — 120N000001 HC R&B MED SURG/OB

## 2021-08-21 PROCEDURE — 250N000013 HC RX MED GY IP 250 OP 250 PS 637: Performed by: HOSPITALIST

## 2021-08-21 PROCEDURE — 0WWG30Z REVISION OF DRAINAGE DEVICE IN PERITONEAL CAVITY, PERCUTANEOUS APPROACH: ICD-10-PCS | Performed by: RADIOLOGY

## 2021-08-21 PROCEDURE — 250N000013 HC RX MED GY IP 250 OP 250 PS 637: Performed by: INTERNAL MEDICINE

## 2021-08-21 PROCEDURE — 99233 SBSQ HOSP IP/OBS HIGH 50: CPT | Performed by: HOSPITALIST

## 2021-08-21 PROCEDURE — C1769 GUIDE WIRE: HCPCS

## 2021-08-21 PROCEDURE — 272N000192 HC ACCESSORY CR2

## 2021-08-21 PROCEDURE — BW111ZZ FLUOROSCOPY OF ABDOMEN AND PELVIS USING LOW OSMOLAR CONTRAST: ICD-10-PCS | Performed by: RADIOLOGY

## 2021-08-21 RX ORDER — HYDROMORPHONE HYDROCHLORIDE 1 MG/ML
1-2 SOLUTION ORAL
Status: DISCONTINUED | OUTPATIENT
Start: 2021-08-21 | End: 2021-08-21

## 2021-08-21 RX ORDER — OXYCODONE HYDROCHLORIDE 5 MG/1
5-10 TABLET ORAL EVERY 4 HOURS PRN
Status: DISCONTINUED | OUTPATIENT
Start: 2021-08-21 | End: 2021-08-22 | Stop reason: HOSPADM

## 2021-08-21 RX ADMIN — MICONAZOLE NITRATE: 20 POWDER TOPICAL at 12:11

## 2021-08-21 RX ADMIN — MICONAZOLE NITRATE: 20 POWDER TOPICAL at 21:26

## 2021-08-21 RX ADMIN — OXYCODONE HYDROCHLORIDE 10 MG: 5 TABLET ORAL at 20:14

## 2021-08-21 RX ADMIN — OXYCODONE HYDROCHLORIDE 10 MG: 5 TABLET ORAL at 12:07

## 2021-08-21 RX ADMIN — LACTULOSE 20 G: 20 SOLUTION ORAL at 21:26

## 2021-08-21 RX ADMIN — PANTOPRAZOLE SODIUM 40 MG: 40 TABLET, DELAYED RELEASE ORAL at 08:07

## 2021-08-21 RX ADMIN — OXYCODONE HYDROCHLORIDE 10 MG: 5 TABLET ORAL at 16:12

## 2021-08-21 RX ADMIN — HYDROMORPHONE HYDROCHLORIDE 1 MG: 1 SOLUTION ORAL at 00:52

## 2021-08-21 RX ADMIN — HYDROMORPHONE HYDROCHLORIDE 2 MG: 1 SOLUTION ORAL at 09:35

## 2021-08-21 RX ADMIN — PANTOPRAZOLE SODIUM 40 MG: 40 TABLET, DELAYED RELEASE ORAL at 16:12

## 2021-08-21 RX ADMIN — LACTULOSE 20 G: 20 SOLUTION ORAL at 16:12

## 2021-08-21 RX ADMIN — LIDOCAINE HYDROCHLORIDE 55 ML: 10 INJECTION, SOLUTION INFILTRATION; PERINEURAL at 10:23

## 2021-08-21 RX ADMIN — LACTULOSE 20 G: 20 SOLUTION ORAL at 08:07

## 2021-08-21 RX ADMIN — SIMETHICONE 40 MG: 20 EMULSION ORAL at 20:21

## 2021-08-21 RX ADMIN — ZOLPIDEM TARTRATE 2.5 MG: 5 TABLET, FILM COATED ORAL at 21:26

## 2021-08-21 RX ADMIN — NICOTINE 1 PATCH: 14 PATCH, EXTENDED RELEASE TRANSDERMAL at 08:08

## 2021-08-21 RX ADMIN — QUETIAPINE FUMARATE 50 MG: 50 TABLET ORAL at 21:26

## 2021-08-21 RX ADMIN — LORAZEPAM 0.25 MG: 0.5 TABLET ORAL at 23:44

## 2021-08-21 RX ADMIN — ONDANSETRON 4 MG: 2 INJECTION INTRAMUSCULAR; INTRAVENOUS at 13:28

## 2021-08-21 RX ADMIN — HYDROMORPHONE HYDROCHLORIDE 2 MG: 1 SOLUTION ORAL at 03:08

## 2021-08-21 RX ADMIN — Medication 1 MG: at 21:26

## 2021-08-21 ASSESSMENT — ACTIVITIES OF DAILY LIVING (ADL)
ADLS_ACUITY_SCORE: 15

## 2021-08-21 NOTE — PROGRESS NOTES
[facility]  Hospitalist Progress Note   08/21/2021          Assessment and Plan:       Jim Richard is a 66-year-old gentleman with alcohol abuse/dependence complicated by alcoholic liver disease with ascites, status post multiple paracentesis in the past, hepatorenal syndrome, chronic kidney disease stage IV, history of recent bacteremia due to Enterococcus, hypertension, COPD, tobacco use disorder, depression/anxiety, thrombocytopenia and obstructive sleep apnea admitted on 8/3/2021 with generalized weakness.     Hospice care patient.  Goals of care  On 8/11 hospitalist team had discussed with patient regarding overall prognosis, patient had then opted for enrolling in hospice.  Hospice meeting on 8/12 done--see consult note for details.   On 8/19 patient with progressively worsening abdominal distention, concern for ileu versus small bowel obstruction.  Discussed with patient, his ex-wife Stacey over the telephone with patient's consent.  They had opted for comfort care.  Oral Dilaudid to oral oxycodone.  IV Dilaudid as needed for pain.  Oral/IV Ativan as needed for anxiety  IV/p.o. as needed Zofran.    Oral Ambien at bedtime as needed for sleep ordered.  Oral atropine drops.  Dulcolax as needed.  Regular diet.  Oxygen as needed for comfort.     Status post tunneled abdominal catheter for recurrent ascites 8/16/2021.  Decompensated alcoholic liver cirrhosis with recurrent ascites  End-stage liver disease with portal hypertension, esophageal varices.  Acute on chronic kidney injury with possible hepatorenal syndrome with CKD stage IV  Patient has been having issues with recurrent ascites requiring multiple paracenteses, every 2 weeks. Presented with recurrent abdominal distention.  Paracentesis (8/4): Removed over 6 L. Fluids not indicated of SBP. Consistent with protal HTN.  Paracentesis 8/9 removed 3.8 L of fluid- no cell count available  Paracentesis on 8/13 for comfort with removal of  2.3L  Meld score 23 on admission, followed by [place] [facility], not a candidate for liver transplant given poor compliance and continued consumption of alcohol.  Commended palliative evaluation.  Creatinine on admission 2.18 and trended up to 3.83 despite above treatment, nephrology recommended poor candidate for dialysis and given HRS status not treatment candidate  Received IV albumin, midodrine and octreotide, stopped since goal hospice at this time.    Underwent tunneled abdominal catheter for recurrent ascites 8/16/2021 by IR.  No immediate complication. Drain approximately 2 L, about 3 times a week for comfort.  Only 225 mL of ascites fluid drained on 8/18, 400 mL drained on 8/20.  Discussed with Dr. Chicas from IR.  Tunneled peritoneal Pleurx catheter repositioned and sutured.  Recommends if continues to leak will need to consider removal of catheter.  Will follow overnight.     IIeus versus acute small bowel obstruction in the setting of C. difficile colitis. Next  Patient with nausea and vomitting on 8/12- 8/13 .  Abdominal pain intermittent sharp pain that is different from his usual abdominal pain.   Abdominal Xray 8/13 showed moderate gaseous distention of large and small bowel most consistent with an adynamic ileus.   Patient was treated conservatively, on full liquid diet. Continues to complain of distention with little relief from pain medication,  abdominal x-ray 8/17 with multiple gas-filled colonic loops and multiple air-fluid levels in the right midabdomen, likely represent ileus versus small bowel obstruction.  General surgery signed off, appreciate recommendation.  Declined NG tube, rectal tube at this time, at risk of bleeding.  Comfort cares as above.     C Diff colitis.  Patient reported diarrhea for about a week prior to presentation.    C diff PCR positive in the ED. Has completed 10 days of Vanco on 8/13  Contact precautions per hospital protocol.     S/P Alcohol  intoxication.  Alcohol dependence.   History of alcohol withdrawals  Depression/anxiety  Patient had a recent hospitalization at [facility] from where he was discharged to TCU.  After he returned back home, he started drinking again.  Admitted to drinking a pint of vodka daily, ethanol level 0.23 on admission.  Psychiatry recommended to follow-up with [facility] , follow-up with outpatient psychiatrist Dr. Flanagan and follow commitment recommendation.  Discontinue PTA medications: Mirtazapine, Trinellix while on comfort care, ileus and concern for bowel obstruction.  Continue Seroquel per patient request.     Prolonged QTC  concern for polypharmacy.  Held Seroquel, Trazodone from admission (8/4) given prolonged QTC.  Have taken over patient's care on day 12 of hospitalization, has been stable off medication.  Opted for hospice care where QTC will not be monitored hence started on Seroquel 25 mg 3 times daily as needed for anxiety or agitation.  Discontinue PTA meds while on comfort care, ileus and concern for bowel obstruction     CKD stage IV.  Fluctuating creatinine at baseline between 2.5-3.  By nephrology during hospitalization, generally poor prognosis, not much to offer from renal standpoint.  As above transition to hospice.     Acute on chronic anemia, possible competent of GI bleed from esophageal varices.  Hemoglobin dropped to a fabiola of 6.9.  Status post 1 unit PRBC transfused 8/5  Hemoglobin has remained stable around 8 since transfusion  No further GI bleed. Gastroenterology followed, no plans for endoscopy.  no further blood draws at this time     Thrombocytopenia, chronic, related to his chronic liver disease  There is no evidence of active bleeding.  Platelet count on admission was 148.       Atypical chest pain. Resolved   Patient complained of chest pain since arrival to the ED.    Troponins negative.  EKG without evidence of acute ischemia     Physical  deconditioning with Adult failure to thrive multifactorial from his chronic alcohol use, alcoholic liver cirrhosis, poor oral intake, and recent diarrhea.    Plan as above to discharge with hospice     Hypoglycemia, likely due to poor oral intake resolved  Blood sugars were in the 40s upon arrival in the ER, improved with a couple of doses of D50.     Tobacco use disorder  Nicotine patch     Recent Enterococcus bacteremia  Had been treated with ampicillin IV for 2 weeks as per ID recommendations.       H/o BPH  Urinary retention S/p jiang cath placement on 8/10.  Discontinue PTA Flomax and Proscar while on comfort care and jiang in.  Continue jiang at discharge given hospice/for comfort purposes     History of COPD  This does not seem to be an acute issue at this time.    PTA inhalers      Obstructive sleep apnea   Noncompliant with CPAP.     Severe malnutrition  In the context of acute illness, chronic illness or disease  Supplements between meals per dietitian       Diet: Snacks/Supplements Adult: Ensure Enlive; Between Meals  DVT Prophylaxis: Pneumatic Compression Devices  Jiang Catheter: PRESENT, indication: Retention  Code Status: No CPR- Do NOT Intubate    Disposition: Expected discharge likely tomorrow if leaking around Pleurx catheter improves.     discussed with patient, bedside RN, interventional radiologist.  Total time greater than 35 minutes. More than 60% of time spent in direct patient care, care coordination, patient counseling, and formalizing plan of care.     Elena Chiang MD        Interval History:      Patient lying in bed.  Alert and awake.  Abdomen distended, leaking around Pleurx catheter.  Dressing changed multiple times overnight.  Patient reports little relief of pain with oral Dilaudid concentrate, reports better relief of pain with oxycodone.  Reports insomnia, unable to sleep while on Seroquel.  Nausea improving.  Able to tolerate diet small amounts.    Reports had bowel movements  yesterday, none this morning.  Continues to take scheduled lactulose.       Physical Exam:        Physical Exam   Temp:  [98.5  F (36.9  C)] 98.5  F (36.9  C)  Pulse:  [56-66] 66  Resp:  [12-24] 16  BP: (139-146)/(68-75) 146/68  SpO2:  [98 %-99 %] 98 %    Intake/Output Summary (Last 24 hours) at 8/21/2021 1619  Last data filed at 8/21/2021 1445  Gross per 24 hour   Intake 360 ml   Output 900 ml   Net -540 ml       Admission Weight: 86.2 kg (190 lb)  Current Weight: 73.3 kg (161 lb 8 oz)    PHYSICAL EXAM  GENERAL: Patient is in no distress. able to answer simple questions.  LUNGS: Bilateral decreased breath sounds. Respirations unlabored  ABDOMEN: Tense, distended, diffuse abdominal tenderness, bowel sounds sluggish.  Right upper quadrant catheter in place.  No guarding or rigidity.  NEURO: Moving all extremities.  Genitourinary Leger catheter in place.  EXTREMITIES: No pedal edema.  SKIN: Warm, dry. No rash  PSYCHIATRY Cooperative       Medications:          lactulose  20 g Oral TID     miconazole   Topical BID     nicotine  1 patch Transdermal Daily     nicotine   Transdermal Q8H     pantoprazole  40 mg Oral BID AC     QUEtiapine  50 mg Oral At Bedtime     acetaminophen **OR** acetaminophen, albuterol, atropine, bisacodyl, artificial tears ophthalmic solution, glucose **OR** dextrose **OR** glucagon, flumazenil, heparin, heparin lock flush, HYDROmorphone, lidocaine 4%, lidocaine, LORazepam **OR** LORazepam, melatonin, naloxone, naloxone, naloxone **OR** naloxone **OR** naloxone **OR** naloxone, ondansetron **OR** ondansetron, oxyCODONE, prochlorperazine **OR** prochlorperazine **OR** prochlorperazine, simethicone, sodium chloride (PF), zolpidem         Data:      All new lab and imaging data was reviewed.

## 2021-08-21 NOTE — PLAN OF CARE
8/20/21 [id number]  COMFORT CARES  Cognitive Concerns/ Orientation: A&Ox4, some word-finding difficulty. Flat affect  BEHAVIOR & AGGRESSION TOOL COLOR: green   ABNL VS/O2: vitals d/c  MOBILITY: Assist x 1 with GB/walker.   PAIN MANAGMENT: Oral dilaudid q2hr for abdominal pain; pain at tip of penis from catheter-topical lido q4h prn   DIET: Regualr diet for comfort  BOWEL/BLADDER: Leger patent- retention. Abdomen distended/taut.  ABNL LAB/BG: none  DRAIN/DEVICES:  Leger cath for retention; R PIV SL  TELEMETRY RHYTHM: NA  SKIN: Scattered scabs and bruises. picking at all his scabs and refusing another dressing. Right abdominal drain leaking, IR updated, recommended to drain PleurX today and apply new dressing and see if leaking stops. 450ml output from PleurX on days, dressing changed for small amount of drainage x1 this shift.  Penis/linda area. Red/rash, miconazole powder applied. New sore to tip of penis, AGUILA  TESTS/PROCEDURES: abd x-ray completed 8/17 for increase pain showed ileus vs SBO  D/C DATE:  Will transition to hospice. SW following and possible discharge today, per SW note hospice facility may need 2 days comfort cares meds sent with patient.  OTHER IMPORTANT INFO: C- Diff, Enteric isolation maintained. If PleurX continues to leak, will notify IR in AM and they may place a stitch. Zofran PRN for nausea.

## 2021-08-21 NOTE — PROGRESS NOTES
Care Management Follow Up    Length of Stay (days): 17    Expected Discharge Date: 08/21/2021     Concerns to be Addressed: adjustment to diagnosis/illness, discharge planning     Patient plan of care discussed at interdisciplinary rounds: Yes    Anticipated Discharge Disposition: Hospice     Anticipated Discharge Services:    Anticipated Discharge DME:      Patient/family educated on Medicare website which has current facility and service quality ratings: no  Education Provided on the Discharge Plan:    Patient/Family in Agreement with the Plan: yes    Referrals Placed by CM/SW: Hospice  Private pay costs discussed: Not applicable    Additional Information:  Pt not yet ready for discharge. SW updated Padmini with [facility] and [facility]. They can both accept pt on Sunday. Pt aware.       JULIA John, LGSW  [phone]  [facility]

## 2021-08-21 NOTE — PLAN OF CARE
/21/21 [id number]  COMFORT CARES  Cognitive Concerns/ Orientation: A&Ox4, some word-finding difficulty. Flat affect  BEHAVIOR & AGGRESSION TOOL COLOR: green   ABNL VS/O2: vitals d/c  MOBILITY: Assist x 1 with GB/walker.   PAIN MANAGMENT: Oral dilaudid switched to oxycodone per pt request; pain at tip of penis from catheter-topical lido q4h prn, has not wanted this shift   DIET: Regualr diet for comfort  BOWEL/BLADDER: Leger patent- retention. Abdomen distended/taut.  ABNL LAB/BG: none  DRAIN/DEVICES:  Leger cath for retention; R PIV SL  TELEMETRY RHYTHM: NA  SKIN: Scattered scabs and bruises. picking at all his scabs and refusing another dressing. Right abdominal PleurX leaking-went to IR today, repositioned and additional stitch placed. Penis/linda area. Red/rash, miconazole powder applied. sore to tip of penis, AGUILA  TESTS/PROCEDURES: abd x-ray completed 8/17 for increase pain showed ileus vs SBO  D/C DATE:  Will transition to hospice. SW following and possible discharge tomorrow pending no further leaking from PleurX site., per SW note hospice facility may need 2 days comfort cares meds sent with patient.  OTHER IMPORTANT INFO: C- Diff, Enteric isolation maintained. If PleurX continues to leak, will notify IR in AM and they will replace it or remove it.  Zofran PRN for nausea. Pt frustrated not able to discharge today

## 2021-08-21 NOTE — PROGRESS NOTES
Care Management Follow Up    Length of Stay (days): 16    Expected Discharge Date: 08/21/2021     Concerns to be Addressed: adjustment to diagnosis/illness, discharge planning     Patient plan of care discussed at interdisciplinary rounds: Yes    Anticipated Discharge Disposition: Hospice     Anticipated Discharge Services:    Anticipated Discharge DME:      Patient/family educated on Medicare website which has current facility and service quality ratings: no  Education Provided on the Discharge Plan:    Patient/Family in Agreement with the Plan: yes    Referrals Placed by CM/SW: Hospice  Private pay costs discussed:     Additional Information:  Ex-spouse Stacey, who is also patient's [facility] has accepted [facility] and is aware discharge is anticipated for Saturday.    kyra Duboseison for [facility] is aware patient can discharge tomorrow. He recommends Saturday  call [facility] office on Saturday at [phone]  Padmini, the liaison at [place] also requested a call on Saturday morning to finalize the admission time.  Stacey is aware there is a $6,000 payment due at admission.  Transportation will be need to be arranged.  It is likely [facility] will want comfort medications sent with patient for a two day supply.      Earline Mauricio, NIKKI

## 2021-08-21 NOTE — IR NOTE
INTERVENTIONAL RADIOLOGY ABSCESS TUBE CHECK  8/21/2021 10:53 AM     HISTORY:  66-year-old male had a tunneled peritoneal catheter placed for malignant ascites requiring frequent paracenteses. Since the catheter has been placed, there has been significant leakage of ascites around the catheter.    COMPARISON: 8/16/2021    FINDINGS: After obtaining informed consent, the patient was placed in a supine position on the fluoroscopy table. The abdomen was prepped and draped in the usual sterile manner. Preliminary ultrasound of the abdomen showed a moderate amount of ascites in the lower abdomen/pelvis. There was a smaller amount of ascites in the upper quadrants and paracolic cutters. On fluoroscopy, the catheter appeared to be in the right mid to upper abdomen. It was elected to attempt to reposition it in the pelvis. 50 cc lidocaine was injected into the peritoneal space. A stiff Glidewire was passed through the catheter and used to push it into the pelvis. 175 cc ascites was able to be aspirated out from the peritoneal space. A fluoroscopic image was saved to document catheter position. A pursestring suture was applied at the skin entry site of the catheter to minimize any leakage of ascitic fluid.    The patient tolerated the procedure well. There were no immediate postprocedure complications. Patient's vital signs were monitored and remained stable throughout the course of the procedure.    Fluoroscopy time: 6 minutes    Total fluoroscopy dose: 85 mGy Air Kerma    Contrast: 20 cc Isovue    Local anesthetic: 55 cc 1% lidocaine    Sedation time: None    IMPRESSION: Tunneled peritoneal PleurX catheter checked and repositioned as above. Paracentesis performed as above yielding 175 cc ascitic fluid. Pursestring suture applied at the catheter entry site. If the patient has persistent leakage, consideration could be made for removal of the catheter.

## 2021-08-21 NOTE — PROVIDER NOTIFICATION
MD Notification    Notified Person: MD    Notified Person Name:Dr Chiang    Notification Date/Time:8/21/21 4961    Notification Interaction:Paged MD on vocera    Purpose of Notification:Pt PleurX leaking.  Day Charge RN yesterday spoke with IR in the afternoon regarding leaking.  IR recommended to drain pt and place new dressing, which was done.  If continued to leak overnight, pt may need stitch done in IR.  Dressing changed x2 overnight.  Need IR to see for continued leaking.    Orders Received:    Comments:

## 2021-08-21 NOTE — IR NOTE
Interventional Radiology Intra-procedural Nursing Note    Patient Name: Jim Richard  Medical Record Number: [id number]  Today's Date: August 21, 2021    Procedure: Pleurx catheter check and suture placement with possible reposition and/or replacement.  Start time: 1020  End time: 1041  Report provided to: [facility] RN  Patient depart time and location: 1046 to [facility]    Note: Patient entered Interventional Radiology Suite number one via cart. Patient awake, alert and orientated. Assisted onto procedural table in supine position. Prepped and draped.  Dr. Jimenes in room. Time out and procedure started. Vital signs stable. Telemetry reading sinus bradycardia.    Procedure well tolerated by patient without complications. Procedure end with debrief by Dr. Jimenes.    Administered medication totals:  Lidocaine 1% 55 mL Intradermal

## 2021-08-21 NOTE — PROVIDER NOTIFICATION
MD Notification    Notified Person: MD    Notified Person Name: Sandra    Notification Date/Time: 0030 8/21/21    Notification Interaction: [employer] webpage    Purpose of Notification: Pt having increased pain, comfort cares, current 1mg PRN dilaudid administered with minimal relief. Pt reports oxycodone worked well in past. Can we add something else for pain? Thanks    Orders Received: Increase dilaudid PRN to 2mg     Comments:

## 2021-08-22 VITALS
BODY MASS INDEX: 25.35 KG/M2 | SYSTOLIC BLOOD PRESSURE: 146 MMHG | DIASTOLIC BLOOD PRESSURE: 68 MMHG | TEMPERATURE: 98.5 F | RESPIRATION RATE: 18 BRPM | HEIGHT: 67 IN | WEIGHT: 161.5 LBS | HEART RATE: 66 BPM | OXYGEN SATURATION: 98 %

## 2021-08-22 PROCEDURE — 250N000013 HC RX MED GY IP 250 OP 250 PS 637: Performed by: INTERNAL MEDICINE

## 2021-08-22 PROCEDURE — 250N000013 HC RX MED GY IP 250 OP 250 PS 637: Performed by: HOSPITALIST

## 2021-08-22 PROCEDURE — 99239 HOSP IP/OBS DSCHRG MGMT >30: CPT | Performed by: HOSPITALIST

## 2021-08-22 RX ORDER — LORAZEPAM 0.5 MG/1
0.5 TABLET ORAL EVERY 6 HOURS PRN
Qty: 10 TABLET | Refills: 0 | Status: SHIPPED | OUTPATIENT
Start: 2021-08-22

## 2021-08-22 RX ORDER — LIDOCAINE HYDROCHLORIDE 20 MG/ML
JELLY TOPICAL EVERY 4 HOURS PRN
Qty: 30 ML | Refills: 0 | Status: SHIPPED | OUTPATIENT
Start: 2021-08-22

## 2021-08-22 RX ORDER — BISACODYL 10 MG
10 SUPPOSITORY, RECTAL RECTAL DAILY PRN
Qty: 10 SUPPOSITORY | Refills: 0 | Status: SHIPPED | OUTPATIENT
Start: 2021-08-22

## 2021-08-22 RX ORDER — ZOLPIDEM TARTRATE 5 MG/1
2.5 TABLET ORAL
Qty: 3 TABLET | Refills: 0 | Status: SHIPPED | OUTPATIENT
Start: 2021-08-22

## 2021-08-22 RX ORDER — SIMETHICONE 40MG/0.6ML
40 SUSPENSION, DROPS(FINAL DOSAGE FORM)(ML) ORAL EVERY 6 HOURS PRN
Qty: 45 ML | Refills: 0 | Status: SHIPPED | OUTPATIENT
Start: 2021-08-22

## 2021-08-22 RX ORDER — NICOTINE 21 MG/24HR
1 PATCH, TRANSDERMAL 24 HOURS TRANSDERMAL DAILY
Qty: 5 PATCH | Refills: 0 | Status: SHIPPED | OUTPATIENT
Start: 2021-08-23

## 2021-08-22 RX ORDER — LORAZEPAM 0.5 MG/1
0.25 TABLET ORAL EVERY 4 HOURS PRN
Qty: 10 TABLET | Refills: 0 | Status: SHIPPED | OUTPATIENT
Start: 2021-08-22 | End: 2021-08-22

## 2021-08-22 RX ORDER — PANTOPRAZOLE SODIUM 40 MG/1
40 TABLET, DELAYED RELEASE ORAL
Qty: 20 TABLET | Refills: 0 | Status: SHIPPED | OUTPATIENT
Start: 2021-08-22

## 2021-08-22 RX ORDER — ZOLPIDEM TARTRATE 5 MG/1
2.5 TABLET ORAL
Qty: 3 TABLET | Refills: 0 | Status: SHIPPED | OUTPATIENT
Start: 2021-08-22 | End: 2021-08-22

## 2021-08-22 RX ORDER — LACTULOSE 10 G/15ML
20 SOLUTION ORAL 3 TIMES DAILY
Qty: 236 ML | Refills: 0 | Status: SHIPPED | OUTPATIENT
Start: 2021-08-22

## 2021-08-22 RX ORDER — OXYCODONE HYDROCHLORIDE 5 MG/1
5-10 TABLET ORAL EVERY 4 HOURS PRN
Qty: 20 TABLET | Refills: 0 | Status: SHIPPED | OUTPATIENT
Start: 2021-08-22

## 2021-08-22 RX ORDER — ACETAMINOPHEN 325 MG/1
650 TABLET ORAL EVERY 8 HOURS PRN
Qty: 10 TABLET | Refills: 0 | Status: SHIPPED | OUTPATIENT
Start: 2021-08-22

## 2021-08-22 RX ORDER — QUETIAPINE FUMARATE 50 MG/1
50 TABLET, FILM COATED ORAL AT BEDTIME
Qty: 5 TABLET | Refills: 0 | Status: SHIPPED | OUTPATIENT
Start: 2021-08-22

## 2021-08-22 RX ORDER — ONDANSETRON 4 MG/1
4 TABLET, ORALLY DISINTEGRATING ORAL EVERY 6 HOURS PRN
Qty: 10 TABLET | Refills: 0 | Status: SHIPPED | OUTPATIENT
Start: 2021-08-22

## 2021-08-22 RX ORDER — OXYCODONE HYDROCHLORIDE 5 MG/1
5-10 TABLET ORAL EVERY 4 HOURS PRN
Qty: 20 TABLET | Refills: 0 | Status: SHIPPED | OUTPATIENT
Start: 2021-08-22 | End: 2021-08-22

## 2021-08-22 RX ADMIN — MICONAZOLE NITRATE: 20 POWDER TOPICAL at 08:19

## 2021-08-22 RX ADMIN — NICOTINE 1 PATCH: 14 PATCH, EXTENDED RELEASE TRANSDERMAL at 08:16

## 2021-08-22 RX ADMIN — LACTULOSE 20 G: 20 SOLUTION ORAL at 08:14

## 2021-08-22 RX ADMIN — PANTOPRAZOLE SODIUM 40 MG: 40 TABLET, DELAYED RELEASE ORAL at 08:14

## 2021-08-22 RX ADMIN — OXYCODONE HYDROCHLORIDE 10 MG: 5 TABLET ORAL at 06:36

## 2021-08-22 RX ADMIN — OXYCODONE HYDROCHLORIDE 10 MG: 5 TABLET ORAL at 00:17

## 2021-08-22 RX ADMIN — OXYCODONE HYDROCHLORIDE 10 MG: 5 TABLET ORAL at 10:49

## 2021-08-22 RX ADMIN — OXYCODONE HYDROCHLORIDE 10 MG: 5 TABLET ORAL at 14:44

## 2021-08-22 ASSESSMENT — ACTIVITIES OF DAILY LIVING (ADL)
ADLS_ACUITY_SCORE: 15
ADLS_ACUITY_SCORE: 14
ADLS_ACUITY_SCORE: 13
ADLS_ACUITY_SCORE: 15

## 2021-08-22 NOTE — PROGRESS NOTES
Care Management Discharge Note    Discharge Date: 08/22/2021       Discharge Disposition: Long Term Care, Hospice    Discharge Services:      Discharge DME:      Discharge Transportation:      Private pay costs discussed: private room/amenity fees    PAS Confirmation Code: [id number]  Patient/family educated on Medicare website which has current facility and service quality ratings: no    Education Provided on the Discharge Plan:    Persons Notified of Discharge Plans: Pt, ex-wife, facility, RN, MD  Patient/Family in Agreement with the Plan: yes    Handoff Referral Completed: No    Additional Information:  Pt will discharge today to [facility] via [facility] @ 1500. Orders faxed and Padmini at [facility] updated and in agreement. Chirstin at [facility] also updated and in agreement. Orders faxed to her. [facility] will fill all hospice meds. Per discussion with discharge pharmacy, pt would get charged if filled here. Pt remains in agreement. He states Stacey will meet him at the facility with the deposit. Voicemail left for her.      JULIA John, LGSW  [phone]  [facility]      PAS-RR    D: Per DHS regulation, SW completed and submitted PAS-RR to [place] [employer] Direct Connect via the Senior LinkAge Line.  PAS-RR confirmation # is : [id number]    I: DANIELE spoke with pt and they are aware a PAS-RR has been submitted.  DANIELE reviewed with pt that they may be contacted for a follow up appointment within 10 days of hospital discharge if their SNF stay is < 30 days.  Contact information for Senior [employer] Line was also provided.    A: Pt verbalized understanding.    P: Further questions may be directed to [facility] LinkAge Line at #[phone], option #4 for PAS-RR staff.

## 2021-08-22 NOTE — PLAN OF CARE
Discharge    Patient discharged to [facility] via wheelchair with  transport.    Care plan note: Patient is A&Ox4; anxious at times but cooperative and redirectable. Patient continues to c/o right-sided abdominal pain and some penile discomfort r/t jiang. Patient requests Oxycodone Q4hrs prn which helps alleviate pain/discomfort. No new complaints.     Listed belongings gathered and returned to patient. Yes  Belongings returned to patient from security/pharmacy Yes  Care Plan and Patient education resolved: Yes  Prescriptions if needed, hard copies sent with patient  Yes  Home and hospital acquired medications returned to patient: NA  Medication Bin checked and emptied on discharge Yes  Follow up appointment made for patient: No

## 2021-08-22 NOTE — DISCHARGE SUMMARY
Discharge Summary  Hospitalist    Date of Admission:  8/3/2021  Date of Discharge:  8/22/2021  Discharging Provider: Elena Chiang MD    Primary Care Physician   FERNANDA BRANTLEY  Primary Care Provider Phone Number: None  Primary Care Provider Fax Number: [phone]    PRINCIPAL DIAGNOSIS  Hospice care patient.  Goals of care  Status post tunneled abdominal catheter for recurrent ascites 8/16/2021.  Decompensated alcoholic liver cirrhosis with recurrent ascites  End-stage liver disease with portal hypertension, esophageal varices.  Acute on chronic kidney injury with possible hepatorenal syndrome with CKD stage IV  IIeus versus acute small bowel obstruction in the setting of C. difficile colitis. Next  C Diff colitis.  S/P Alcohol intoxication.  Prolonged QTC  concern for polypharmacy.  Acute on chronic anemia, possible competent of GI bleed from esophageal varices.  Thrombocytopenia, chronic, related to his chronic liver disease  Atypical chest pain. Resolved   Physical deconditioning with Adult failure to thrive multifactorial from his chronic alcohol use, alcoholic liver  Hypoglycemia, likely due to poor oral intake resolved  Tobacco use disorder  Urinary retention S/p jiang cath placement on 8/10.  Severe malnutrition in setting of acute illness     Past Medical History:   Diagnosis Date     Alcoholism (H) 1/14/2013    had treatment at [facility]     Anxiety      COPD (chronic obstructive pulmonary disease) (H)      Coronary artery disease 09/09/2014    Nuclear stress test with slight fixed defect inferiorly, no ischemia     Depression     w/anxiety     Esophageal varices with bleeding 04/28/2018    6 varices banded on EGD     Heart attack (H)      Hepatomegaly     Fatty liver, chronic alcoholic     Hyperlipemia      Hypertension      JANIS on CPAP      Peripheral vascular disease (H)      PVD (peripheral vascular disease) (H)      SDH (subdural hematoma) (H) 04/26/2018    Right side, resolved  spontaneously     Spinal stenosis      Substance abuse (H)        History of Present Illness   Jim Richard is an 66 year old male who presented with weakness     Hospital Course   Jim Richard is a 66-year-old gentleman with alcohol abuse/dependence complicated by alcoholic liver disease with ascites, status post multiple paracentesis in the past, hepatorenal syndrome, chronic kidney disease stage IV, history of recent bacteremia due to Enterococcus, hypertension, COPD, tobacco use disorder, depression/anxiety, thrombocytopenia and obstructive sleep apnea admitted on 8/3/2021 with generalized weakness.     Hospice care patient.  Goals of care  On 8/11 hospitalist team had discussed with patient regarding overall prognosis, patient had then opted for enrolling in hospice.  Hospice meeting on 8/12 done--see consult note for details.   On 8/19 patient with progressively worsening abdominal distention, concern for ileuS versus small bowel obstruction. Discussed with patient, his ex-wife Stacey over the telephone with patient's consent.  They had opted for comfort care.  Continue oral oxycodone as needed for pain, optimize as able to tolerate.  Oral Ativan as needed as needed for anxiety.  Oral Zofran as needed for nausea and vomiting.  Patient having concerns with insomnia, little relief of symptoms with Seroquel and Ativan.  Per patient request while on comfort care ordered Ambien at 2.5 mg as needed at bedtime for sleep.  Oral Tylenol as needed for pain while on comfort care.  Dulcolax as needed.  Continue Leger catheter while on comfort care, lidocaine gel as needed for redness.  Simethicone orally as needed for abdominal cramping.    Nicotine patch during hospitalization and at time of discharge.  Regular diet.  Oxygen as needed for comfort.  Patient alert oriented x3 on day of admission.  Agrees with plan.     Status post tunneled abdominal catheter for recurrent ascites 8/16/2021.  Decompensated  alcoholic liver cirrhosis with recurrent ascites  End-stage liver disease with portal hypertension, esophageal varices.  Acute on chronic kidney injury with possible hepatorenal syndrome with CKD stage IV  Patient has been having issues with recurrent ascites requiring multiple paracenteses, every 2 weeks. Presented with recurrent abdominal distention.  Paracentesis (8/4): Removed over 6 L. Fluids not indicated of SBP. Consistent with protal HTN.  Paracentesis 8/9 removed 3.8 L of fluid- no cell count available  Paracentesis on 8/13 for comfort with removal of 2.3L  Meld score 23 on admission, followed by [place] [facility], not a candidate for liver transplant given poor compliance and continued consumption of alcohol.  Commended palliative evaluation.  Creatinine on admission 2.18 and trended up to 3.83 despite above treatment, nephrology recommended poor candidate for dialysis and given HRS status not treatment candidate  Received IV albumin, midodrine and octreotide, stopped since goal hospice at this time.    Underwent tunneled abdominal catheter for recurrent ascites 8/16/2021 by IR.  No immediate complication. Drain approximately 2 L, about 3 times a week for comfort.  Only 225 mL of ascites fluid drained on 8/18, 400 mL drained on 8/20.  On 8/21 had undergone tunneled peritoneal Pleurx catheter repositioned and sutured by interventional radiology as he had had leaking drain.  No leakage around drain since 8/21.  Continue to monitor.  If leaking drain will need drain removal.  Continue PTA pantoprazole.  Continue PTA lactulose.     IIeus versus acute small bowel obstruction in the setting of C. difficile colitis. Next  Patient with nausea and vomitting on 8/12- 8/13 .  Abdominal pain intermittent sharp pain that is different from his usual abdominal pain.   Abdominal Xray 8/13 showed moderate gaseous distention of large and small bowel most consistent with an adynamic ileus.   Patient was treated  conservatively, on full liquid diet. Continued to complain of distention with little relief from pain medication,  abdominal x-ray 8/17 with multiple gas-filled colonic loops and multiple air-fluid levels in the right midabdomen, likely represent ileus versus small bowel obstruction.  General surgery followed.  Supportive care.  Declined NG tube, rectal tube at this time, at risk of bleeding.  Comfort cares as above.  As needed simethicone for cramping.  Dulcolax suppository for constipation.     C Diff colitis.  Patient reported diarrhea for about a week prior to presentation.    C diff PCR positive in the ED. Has completed 10 days of Vanco on 8/13  Contact precautions per hospital protocol.     S/P Alcohol intoxication.  Alcohol dependence.   History of alcohol withdrawals  Depression/anxiety  Patient had a recent hospitalization at [facility] from where he was discharged to TCU.  After he returned back home, he started drinking again.  Admitted to drinking a pint of vodka daily, ethanol level 0.23 on admission.  Psychiatry recommended to follow-up with [facility] , follow-up with outpatient psychiatrist Dr. Carpenter and follow commitment recommendation.  Discontinue PTA medications: Mirtazapine, Trinellix while on comfort care, ileus and concern for bowel obstruction.  Continue Seroquel per patient request.     Prolonged QTC  concern for polypharmacy.  Held Seroquel, Trazodone from admission (8/4) given prolonged QTC.  Have taken over patient's care on day 12 of hospitalization, has been stable off medication.  Opted for hospice care where QTC will not be monitored hence started on Seroquel 25 mg 3 times daily as needed for anxiety or agitation.  Discontinue PTA meds while on comfort care, ileus and concern for bowel obstruction     CKD stage IV.  Fluctuating creatinine at baseline between 2.5-3.  By nephrology during hospitalization, generally poor prognosis, not much to offer  from renal standpoint.  As above transition to hospice.     Acute on chronic anemia, possible competent of GI bleed from esophageal varices.  Hemoglobin dropped to a fabiola of 6.9.  Status post 1 unit PRBC transfused 8/5  Hemoglobin has remained stable around 8 since transfusion  Gastroenterology followed.  No further GI bleeding.  no further blood draws at this time     Thrombocytopenia, chronic, related to his chronic liver disease   Platelet count on admission was 148.  No bleeding.     Atypical chest pain. Resolved   Patient complained of chest pain on admission  Troponins negative.  EKG without evidence of acute ischemia     Physical deconditioning with Adult failure to thrive multifactorial from his chronic alcohol use, alcoholic liver cirrhosis, poor oral intake, and recent diarrhea.    CT head, CT C-spine on admission no acute pathology.  Plan as above to discharge with hospice     Hypoglycemia, likely due to poor oral intake resolved  Blood sugars in 40s on admission, improved with D50.     Tobacco use disorder  Nicotine patch     Recent Enterococcus bacteremia  Had been treated with ampicillin IV for 2 weeks as per ID recommendations at that time.       H/o BPH  Urinary retention S/p jiang cath placement on 8/10.  Discontinue PTA Flomax and Proscar while on comfort care and jiang in.  Continue jiang at discharge given hospice/for comfort      History of COPD  No acute issues.     Obstructive sleep apnea   Noncompliant with CPAP.     Severe malnutrition   In the setting of poor oral intake acute illness.  on comfort care.  Diet as Able to tolerate.     Elena Chiang MD.    Pending Results   Unresulted Labs Ordered in the Past 30 Days of this Admission     No orders found from 7/4/2021 to 8/4/2021.             Physical Exam   Vitals:    08/15/21 0703 08/17/21 0625 08/19/21 0514   Weight: 79.5 kg (175 lb 4.3 oz) 73.3 kg (161 lb 9.6 oz) 73.3 kg (161 lb 8 oz)     Vital Signs with Ranges  Temp:  [98.5  F (36.9   C)] 98.5  F (36.9  C)  Pulse:  [66] 66  Resp:  [16-20] 18  BP: (146)/(68) 146/68  SpO2:  [98 %] 98 %  I/O last 3 completed shifts:  In: 360 [P.O.:360]  Out: 1675 [Urine:1500; Drains:175]  PHYSICAL EXAM  GENERAL: Patient is in no distress. able to answer simple questions.  LUNGS: Bilateral decreased breath sounds. Respirations unlabored  ABDOMEN: Tense, distended, minimal abdominal tenderness, bowel sounds sluggish.  Right upper quadrant catheter in place.  No guarding or rigidity.  NEURO: Moving all extremities.  Genitourinary Leger catheter in place.  SKIN: Warm, dry.   PSYCHIATRY Cooperative  )Consultations This Hospital Stay   CARE MANAGEMENT / SOCIAL WORK IP CONSULT  PHYSICAL THERAPY ADULT IP CONSULT  GASTROENTEROLOGY IP CONSULT  PSYCHIATRY IP CONSULT  NEPHROLOGY IP CONSULT  CARE MANAGEMENT / SOCIAL WORK IP CONSULT  SPIRITUAL HEALTH SERVICES IP CONSULT  SURGERY GENERAL IP CONSULT    Time Spent on this Encounter   IElena MD, personally saw the patient today and spent greater than 30 minutes discharging this patient.. Discussed with patient, floor RN, care coordinator, .    Discharge Disposition   Discharged to long-term care facility  Condition at discharge: Fair    Discharge Orders      General info for SNF    Length of Stay Estimate:Long Term Care: Estimated # of Days 31-90  Condition at Discharge: Terminal  Level of care:board and care  Rehabilitation Potential: Poor  Admission H&P remains valid and up-to-date: Yes  Recent Chemotherapy: N/A  Use Nursing Home Standing Orders: Yes     Mantoux instructions    Give two-step Mantoux (PPD) Per Facility Policy Yes     Follow Up and recommended labs and tests    Follow up with Nursing home physician.    Follow-up with hospice team per schedule.  Drain approximately 2 L of ascites fluid about 3 times a week for comfort if any concerns with leakage around the drain will need to consider removal of  Pleurx catheter.     Reason for your  hospital stay    You were admitted to the hospital with generalized weakness, decompensated alcoholic liver cirrhosis with recurrent ascites and end-stage liver disease, acute on chronic kidney injury.  During hospitalization developed ileus with concern for acute small bowel obstruction in the setting of C. difficile colitis.    Followed by [place] [facility], nephrology, general surgery, interventional radiology, palliative team during hospitalization.  Patient had opted for comfort care, family in agreement.  Underwent tunneled abdominal catheter for recurrent ascites on 8/16, transition to hospice care on 8/19.     Additional Discharge Instructions    Will need to switch pain medication if needed for pain control.  Switched from oral Dilaudid concentrate to oral oxycodone per patient request on 8/21.  Goal 2-3 bowel movements a day to prevent encephalopathy.     Activity - Up with nursing assistance     Jiang catheter    To straight gravity drainage. Change catheter every 2 weeks and PRN for leaking or decreased uring output with signs of bladder distention.     Fall precautions     Advance Diet as Tolerated    Follow this diet upon discharge: Orders Placed This Encounter      Regular Diet Adult       Discharge Medications   Current Discharge Medication List      START taking these medications    Details   acetaminophen (TYLENOL) 325 MG tablet Take 2 tablets (650 mg) by mouth every 8 hours as needed for mild pain, fever or other (and adjunct with moderate or severe pain or per patient request)  Qty: 10 tablet, Refills: 0    Associated Diagnoses: Hospice care patient      bisacodyl (DULCOLAX) 10 MG suppository Place 1 suppository (10 mg) rectally daily as needed for constipation  Qty: 10 suppository, Refills: 0    Associated Diagnoses: Hospice care patient      lidocaine (XYLOCAINE) 2 % external gel Apply topically every 4 hours as needed for moderate pain (over jiang cath site)  Qty: 30 mL, Refills: 0     Associated Diagnoses: Hospice care patient      LORazepam (ATIVAN) 0.5 MG tablet Place 1 tablet (0.5 mg) under the tongue every 6 hours as needed for anxiety  Qty: 10 tablet, Refills: 0    Associated Diagnoses: Hospice care patient      nicotine (NICODERM CQ) 14 MG/24HR 24 hr patch Place 1 patch onto the skin daily  Qty: 5 patch, Refills: 0    Associated Diagnoses: Hospice care patient      simethicone (MYLICON) 40 MG/0.6ML suspension Take 0.6 mLs (40 mg) by mouth every 6 hours as needed for cramping  Qty: 45 mL, Refills: 0    Associated Diagnoses: Hospice care patient      zolpidem (AMBIEN) 5 MG tablet Take 0.5 tablets (2.5 mg) by mouth nightly as needed for sleep  Qty: 3 tablet, Refills: 0    Associated Diagnoses: Hospice care patient         CONTINUE these medications which have CHANGED    Details   lactulose (CHRONULAC) 10 GM/15ML solution Take 30 mLs (20 g) by mouth 3 times daily  Qty: 236 mL, Refills: 0    Associated Diagnoses: Hospice care patient      miconazole (MICATIN) 2 % external powder Apply topically 2 times daily as needed for itching or other Groin areas  Qty: 43 g, Refills: 0    Associated Diagnoses: Hospice care patient      ondansetron (ZOFRAN-ODT) 4 MG ODT tab Take 1 tablet (4 mg) by mouth every 6 hours as needed for nausea or vomiting  Qty: 10 tablet, Refills: 0    Associated Diagnoses: Hospice care patient      oxyCODONE (ROXICODONE) 5 MG tablet Take 1-2 tablets (5-10 mg) by mouth every 4 hours as needed for moderate to severe pain or severe pain  Qty: 20 tablet, Refills: 0    Associated Diagnoses: Hospice care patient      pantoprazole (PROTONIX) 40 MG EC tablet Take 1 tablet (40 mg) by mouth 2 times daily (before meals)  Qty: 20 tablet, Refills: 0    Associated Diagnoses: Hospice care patient      QUEtiapine (SEROQUEL) 50 MG tablet Take 1 tablet (50 mg) by mouth At Bedtime  Qty: 5 tablet, Refills: 0    Associated Diagnoses: Anxiety and depression         STOP taking these medications        albuterol (PROAIR HFA/PROVENTIL HFA/VENTOLIN HFA) 108 (90 Base) MCG/ACT inhaler Comments:   Reason for Stopping:         disulfiram (ANTABUSE) 250 MG tablet Comments:   Reason for Stopping:         finasteride (PROSCAR) 5 MG tablet Comments:   Reason for Stopping:         fluticasone-vilanterol (BREO ELLIPTA) 200-25 MCG/INH inhaler Comments:   Reason for Stopping:         gabapentin (NEURONTIN) 100 MG capsule Comments:   Reason for Stopping:         hydrOXYzine (ATARAX) 50 MG tablet Comments:   Reason for Stopping:         midodrine (PROAMATINE) 10 MG tablet Comments:   Reason for Stopping:         mirtazapine (REMERON) 30 MG tablet Comments:   Reason for Stopping:         multivitamin w/minerals (THERA-VIT-M) tablet Comments:   Reason for Stopping:         spironolactone (ALDACTONE) 25 MG tablet Comments:   Reason for Stopping:         tamsulosin (FLOMAX) 0.4 MG capsule Comments:   Reason for Stopping:         traZODone (DESYREL) 100 MG tablet Comments:   Reason for Stopping:         vortioxetine (TRINTELLIX) 10 MG tablet Comments:   Reason for Stopping:             Allergies   Allergies   Allergen Reactions     Amlodipine Swelling     Lisinopril      Other reaction(s): Angioedema  Mouth and tongue swelling   Mouth and tongue swelling          DATA  Most Recent 3 CBC's:Recent Labs   Lab Test 08/11/21  0824 08/08/21  1305 08/07/21  1159 08/06/21  1157   WBC 3.8* 5.0  --  2.8*   HGB 8.0* 8.7* 9.2* 8.6*   MCV 96 97  --  95   * 108*  --  78*      Most Recent 3 BMP's:  Recent Labs   Lab Test 08/12/21  1235 08/12/21  0823 08/11/21  0824 08/10/21  0731 08/09/21  0808 08/09/21  0808   NA  --   --  132* 130*  --  134  134   POTASSIUM  --   --  4.2 4.3  --  4.4  4.3   CHLORIDE  --   --  105 103  --  108  107   CO2  --   --  16* 18*  --  17*  18*   BUN  --   --  47* 48*  --  45*  45*   CR  --   --  3.83* 3.51*  --  3.44*  3.43*   ANIONGAP  --   --  11 9  --  9  9   KEV  --   --  8.2* 8.1*  --  8.0*  8.1*   GLC  166* 105* 109* 109*   < > 106*  108*    < > = values in this interval not displayed.     Most Recent 2 LFT's:  Recent Labs   Lab Test 08/09/21  0808 08/03/21 2017   AST 19 43   ALT 18 17   ALKPHOS 118 176*   BILITOTAL 0.6 1.0     Mild  Most Recent 3 Troponin's:  Recent Labs   Lab Test 08/04/21  1427 08/03/21  2017 03/10/21  2252 03/10/21  0956 02/09/21  0223   TROPI  --   --  0.015 <0.015 <0.015   TROPONIN <0.015 0.016  --   --   --        Results for orders placed or performed during the hospital encounter of 08/03/21   CT Head w/o Contrast    Narrative    CT HEAD W/O CONTRAST 8/3/2021 9:42 PM    INDICATION: Head injury from fall.  TECHNIQUE: CT scan of the head without contrast. Dose reduction  techniques were used.  CONTRAST: None.  COMPARISON: Head CT 3/10/2021    FINDINGS:   No intracranial hemorrhage, extraaxial collection, mass effect or CT  evidence of acute infarct.  Normal parenchymal density for age. Mild  generalized volume loss. The ventricles are proportional to the sulci.  Osseous structures are intact. Unremarkable orbits. Paranasal sinuses  are free of significant disease. Clear mastoid air cells.      Impression    IMPRESSION:  No intracranial hemorrhage, mass, or definite CT evidence of recent  ischemia.    BLAIR DARBY MD         SYSTEM ID:  [id number]   Lumbar spine CT w/o contrast    Narrative    EXAM: CT LUMBAR SPINE W/O CONTRAST  LOCATION: [facility]  DATE/TIME: 8/3/2021 11:03 PM    INDICATION: Trauma - L-Spine Injury.    COMPARISON: CT abdomen and pelvis 6/29/2021.    TECHNIQUE: Routine CT Lumbar Spine without IV contrast. Multiplanar reformats. Dose reduction techniques were used.     FINDINGS:  VERTEBRA: Five lumbar-type vertebrae. Vertebral body height is normal. There is stable grade 1 degenerative anterolisthesis of L4 on L5 of approximately 6 mm. Posterior instrumented fusion from L3 through S1. The hardware is intact. There is some lucency    marginating the bilateral S1 screws suggesting loosening. Decompressive laminectomy from L3 through L5. Advanced disc degeneration at L2-L3. Diffuse bony demineralization. No definite acute fracture or subluxation.     CANAL/FORAMINA: No high grade central canal or neural foraminal narrowing.    PARASPINAL: Moderate ascites.      Impression    IMPRESSION:  1.  No definite acute fracture or subluxation.  2.  Lucency marginating the bilateral S1 transpedicular screws suggestive of loosening. The hardware is intact.  3.  Postoperative changes elsewhere as described.   US Paracentesis    Narrative    US PARACENTESIS 8/4/2021 4:20 PM    CLINICAL HISTORY: HIGH VOLUME paracentesis with or without diagnostic  fluid analysis with labs to be drawn if ordered. Total paracentesis  volume as much as possible.    PROCEDURE: Informed consent obtained. Time out performed. The abdomen  was prepped and draped in a sterile fashion. 10 mL of 1% lidocaine was  infused into local soft tissues. An 8French catheter system was  introduced into the abdominal ascites under ultrasound guidance.    6.7 liters of clear yellow fluid were removed and sent to lab if  requested.    Patient tolerated procedure well.    Ultrasound imaging was obtained and placed in the patient's permanent  medical record.      Impression    IMPRESSION:  1.  Status post ultrasound-guided paracentesis.    MACKENZIE DYE MD         SYSTEM ID:  [id number]   US Paracentesis    Narrative    US PARACENTESIS 8/9/2021 1:34 PM    CLINICAL HISTORY: ascites, increased abd distension    PROCEDURE: Informed consent obtained. Time out performed. The abdomen  was prepped and draped in a sterile fashion. 10 mL of 1% lidocaine was  infused into local soft tissues. A 5 Sinhala catheter system was  introduced into the abdominal ascites under ultrasound guidance.    3.8 liters of clear fluid were removed and sent to lab if requested.    Patient tolerated procedure well.    Ultrasound imaging  was obtained and placed in the patient's permanent  medical record.      Impression    IMPRESSION:  1.  Status post ultrasound-guided paracentesis.    ANIL KERN MD         SYSTEM ID:  [id number]   XR Abdomen 1 View    Narrative    XR ABDOMEN 1 VIEW 8/13/2021 11:42 AM    HISTORY: C.diff, abdominal pain, nausea/emesis    COMPARISON: None.      Impression    IMPRESSION: Moderate gaseous distention of large and small bowel most  consistent with an adynamic ileus. There is gas through the rectum. No  definite free intraperitoneal air.    ANIL KERN MD         SYSTEM ID:  [id number]   IR Intraperitoneal Cath Tunnel Ascites    Narrative    [facility]  DATE: 8/16/2021    PROCEDURE: TUNNELED INTRAPERITONEAL CATHETER FOR ASCITES    INTERVENTIONAL RADIOLOGIST: Kapil Cantu MD.    INDICATION: Refractory malignant ascites.    SEDATION: Versed 2 mg. Fentanyl 100 mcg. The procedure was performed  with administration intravenous conscious sedation with appropriate  preoperative, intraoperative, and postoperative evaluation. During the  time-out, immediately prior to administration of medications, the  patient was reassessed for adequacy to receive conscious sedation.  15 minutes of supervised face to face conscious sedation time was  provided by a radiology nurse under my direct supervision.    ANTIBIOTICS: Ancef 2 gram IV.  ADDITIONAL MEDICATIONS: None.  Air Kerma: 17 mGy  FLUOROSCOPIC TIME: 1.1 mins  CONTRAST: None    COMPLICATIONS: No immediate complications.    STERILE BARRIER TECHNIQUE: Maximum sterile barrier technique was used.  Cutaneous antisepsis was performed at the operative site with  application of 2% chlorhexidine and large sterile drape. Prior to the  procedure, the surgeon and assistant performed hand hygiene and wore  hat, mask, sterile gown, and sterile gloves during the entire  procedure.    PROCEDURE:   The right lower quadrant was prepped and draped in usual sterile  fashion followed by  local anesthesia with 1% Xylocaine. Ultrasound  revealed large volume ascites, and an ultrasound image was obtained  and placed in the patient's permanent medical record. Using real-time  ultrasound for needle placement, Yueh needle was advanced into the  peritoneal space revealing clear yellow fluid. The tunneled peritoneal  drainage catheter was then tunneled in the subcutaneous tissues. Under  fluoroscopy, an 0.035 Amplatz Superstiff wire was advanced into the  peritoneal space. Following dilation of the tract and placement of a  peel-away sheath, the catheter was deployed into the peritoneal space.  Paracentesis was performed with removal of 2 liters of fluid. A  sterile dressing was applied.    FINDINGS:   Large volume ascites. Successful placement of tunneled peritoneal  drainage catheter.       Impression    IMPRESSION:  Successful placement of tunneled peritoneal drainage catheter. 2  liters of ascites was removed.     CPT codes:  88063  91599    The CPT codes are for physician use only.    MARGARET ROMERO MD         SYSTEM ID:  [id number]   US Paracentesis    Narrative    US PARACENTESIS 8/13/2021 2:58 PM    CLINICAL HISTORY: HIGH VOLUME paracentesis with or without diagnostic  fluid analysis with labs to be drawn if ordered. Total paracentesis  volume as much as possible.    PROCEDURE: Informed consent obtained. Time out performed. The abdomen  was prepped and draped in a sterile fashion. 10 mL of 1% lidocaine was  infused into local soft tissues. A 5 [place] catheter system was  introduced into the abdominal ascites under ultrasound guidance.    2.3 liters of clear fluid were removed and sent to lab if requested.    Patient tolerated procedure well.    Ultrasound imaging was obtained and placed in the patient's permanent  medical record.      Impression    IMPRESSION:  1.  Status post ultrasound-guided paracentesis.    ANIL KERN MD         SYSTEM ID:  [id number]   XR Abdomen 2 Views    Narrative     ABDOMEN TWO VIEW   8/17/2021 2:31 PM     HISTORY: Ileus followup, recurrent ascites.    COMPARISON: Abdominal x-ray on 8/13/2021.      Impression    IMPRESSION: Upright and supine views of the abdomen and pelvis were  obtained. Multiple gas-filled colonic loops and multiple air-fluid  levels in the right midabdomen, likely represent ileus versus small  bowel obstruction. No free peritoneal or portal venous gas. There is  right-sided peritoneal catheter/drain. Post surgical changes of L3-S1  spinal fusion.    CRUZ GILMAN MD         SYSTEM ID:  [id number]    Abscess Tube Check    [facility]    INTERVENTIONAL RADIOLOGY ABSCESS TUBE CHECK  8/21/2021 10:53 AM     HISTORY:  66-year-old male had a tunneled peritoneal catheter placed  for malignant ascites requiring frequent paracenteses. Since the  catheter has been placed, there has been significant leakage of  ascites around the catheter.    COMPARISON: 8/16/2021    FINDINGS: After obtaining informed consent, the patient was placed in  a supine position on the fluoroscopy table. The abdomen was prepped  and draped in the usual sterile manner. Preliminary ultrasound of the  abdomen showed a moderate amount of ascites in the lower  abdomen/pelvis. There was a smaller amount of ascites in the upper  quadrants and paracolic cutters. On fluoroscopy, the catheter appeared  to be in the right mid to upper abdomen. It was elected to attempt to  reposition it in the pelvis. 50 cc lidocaine was injected into the  peritoneal space. A stiff Glidewire was passed through the catheter  and used to push it into the pelvis. 175 cc ascites was able to be  aspirated out from the peritoneal space. A fluoroscopic image was  saved to document catheter position. A pursestring suture was applied  at the skin entry site of the catheter to minimize any leakage of  ascitic fluid.    The patient tolerated the procedure well. There were no immediate  postprocedure complications. Patient's  vital signs were monitored and  remained stable throughout the course of the procedure.    Fluoroscopy time: 6 minutes    Total fluoroscopy dose: 85 mGy Air Kerma    Contrast: 20 cc Isovue    Local anesthetic: 55 cc 1% lidocaine    Sedation time: None      Impression    IMPRESSION: Tunneled peritoneal PleurX catheter checked and  repositioned as above. Paracentesis performed as above yielding 175 cc  ascitic fluid. Pursestring suture applied at the catheter entry site.  If the patient has persistent leakage, consideration could be made for  removal of the catheter.    GARLAND PEACOCK MD         SYSTEM ID:  [id number]

## 2021-08-22 NOTE — PLAN OF CARE
8/21/21 [id number]  COMFORT CARES  Cognitive Concerns/ Orientation: A&Ox4, some word-finding difficulty. Flat affect  BEHAVIOR & AGGRESSION TOOL COLOR: green   ABNL VS/O2: vitals d/c  MOBILITY: Assist x 1 with Gb/walker, ambulated in halls this evening.   PAIN MANAGMENT: Oral dilaudid switched to oxycodone per pt request; pain at tip of penis from catheter-topical lido q4h prn, has not wanted this shift   DIET: Regualr diet for comfort  BOWEL/BLADDER: Leger patent- retention. Abdomen distended/taut.  ABNL LAB/BG: none  DRAIN/DEVICES:  Leger cath for retention; R PIV SL. PleurX cath dressing CDI, no drainage noted overnight  TELEMETRY RHYTHM: NA  SKIN: Scattered scabs and bruises. picking at all his scabs and refusing another dressing. Right abdominal PleurX leaking-went to IR 8/21, repositioned and additional stitch placed. Penis/linda area. Red/rash, miconazole powder applied. sore to tip of penis, AGUILA  TESTS/PROCEDURES: abd x-ray completed 8/17 for increase pain showed ileus vs SBO  D/C DATE:  Will transition to hospice. SW following and possible discharge today pending no further leaking from PleurX site., per SW note hospice facility may need 2 days comfort cares meds sent with patient.  OTHER IMPORTANT INFO: C- Diff, Enteric isolation maintained. If PleurX continues to leak, will notify IR in AM and they will replace or remove it.  Zofran PRN for nausea. Pt frustrated not able to discharge yesterday. Ambien and melatonin PRN for sleep

## 2021-11-02 NOTE — PROGRESS NOTES
[facility] [place]    Hospitalist Progress Note    Assessment & Plan   Jim Richard is a 63 year old male who was admitted on 4/26/2018.     63-year-old male with history of alcoholic dependence and alcoholic liver disease, esophageal varices, COPD, ongoing tobacco and alcohol use, presenting with:     1. Hematemesis  secondary to variceal bleeding in the setting of ibuprofen use and alcohol use contributing. Got 2 units of blood in the emergency room. EGD revealed prominent distal esophageal varices.  Stigmata of recent bleeding; red whale signs present.  Stomach full of fresh blood.  Six bands were placed; hemostasis was complete after procedure. NO As per GI  Plan:   - Octreotide gtt 50 mcg/hour till early afternoon today then d/c  - PPI 40 mg IV q12  - cetriaxone  - Watch for ethanol withdrawal and rebleeding (serial Hb and hemodynamic). No transfusion since early 4/7 and Hb staying above 7  - If patient demonstrates clinically significant bleeding despite above therapy: TIPS  - tolerating on clear liquid  - GI following along. Input appreciated. Can be transferred out today    2. Anemia and thrombocypenia: secondary to bleeding and alcohol abuse, respectively. Stable  - monitoring    3.  Alcoholic liver disease.  4/23/18 Us fatty infiltration of liver. Given varices portal hypertension present  - The patient was advised to abstain from alcohol  - Psych and Chem Dep consultations.   - recheck liver enzyme and INR in am    4.  Alcohol dependence and withdrawal  -  CIWA protocol with p.r.n. Ativan dosing  -   multivitamin, thiamine and folate  -   ? Korsakoff    5.  History of chronic obstructive pulmonary disease, currently stable.   -  Continue Breo-Ellipta    6.  Hyperlipidemia.  - hold the atorvastatin while he is n.p.o.     7.  Depression  - have been holding Cymbalta, mirtazapine and Seroquel  - psych consultatation done; recommendation Remeron Sol-tab 30mg qhs  - neurology recommended  November 2, 2021         Patient: Javier Lopez   YOB: 1992   Date of Visit: 11/2/2021           To Whom it May Concern:    Javier Lopez was seen in my clinic on 11/2/2021. He should be excused from work until November 5 on that day he will be evaluated by a medical specialist.    If you have any questions or concerns, please don't hesitate to call.        Sincerely,           Licha Kwan M.D.  Electronically Signed      considering restarting cymbalta. Possible restart at discharge ? after discussion with psy tomorrow.      8.  Headaches.  for 4 weeks behind left ear. Constant. CT negative 4-21,. Ear examined yesterday unremarkable per patient.   - Neuro consulted. Input appreciated. Awating recommendation following imaging studies they obtained.   - see imaging results below. No dissection. Thin subdural collection along the posterior aspect of the  right cerebral convexity measuring up to 0.8 cm in thickness.Differential diagnosis remains chronic subdural hematoma or chronic  subdural hygroma.   - Neuro also concerned for a CSF leak. They are consulting neurosurgery and more imaging to determine if present. Neurocheck q 4 hours  - adding Lyrica for pain      9.  CODE STATUS:  DNR/DNI. Need to be reconfirmed at time of discharge      10.  Deep vein thrombosis prophylaxis with PCDs in the setting of GI bleed.      11.  GI prophylaxis.  He is going to be on a Protonix iv bid    # Pain Assessment:  Current Pain Score 4/29/2018   Patient currently in pain? -   Pain score (0-10) 9   Pain location -   Pain descriptors -   low dose oxycodone and dilaudid prn    DVT Prophylaxis: SCD  Code Status: DNR/DNI    Disposition: Expected discharge 3-4 days depending on bleeding and etoh withdrawal. remopve jiang today and transfer out of ICU    Text Page (7am - 6pm)  Peter York MD    Interval History   No hematemesis, hemodynamic instability, oriented 3x, no w/d. Main compain is still headache and unchanged since yesterday    -Data reviewed today: I reviewed all new labs and imaging results over the last 24 hours. I personally reviewed none    Physical Exam   Temp: 98.8  F (37.1  C) Temp src: Bladder BP: 145/85   Heart Rate: 69 Resp: 14 SpO2: 95 % O2 Device: Nasal cannula Oxygen Delivery: 3 LPM  Vitals:    04/26/18 2002 04/26/18 2238   Weight: 93 kg (205 lb) 92.6 kg (204 lb 2.3 oz)     Vital Signs with Ranges  Temp:  [98.4  F (36.9   C)-99.1  F (37.3  C)] 98.8  F (37.1  C)  Heart Rate:  [60-94] 69  Resp:  [8-24] 14  BP: ()/() 145/85  SpO2:  [92 %-100 %] 95 %  I/O last 3 completed shifts:  In: 2234.5 [P.O.:1060; I.V.:1174.5]  Out: 2355 [Urine:2355]    Constitutional: alert , oriented 3x  Respiratory: clear to auscultation, no wheezing or rhonchi   Cardiovascular: regular rate and rhythm without murmer or rub   GI:positive bowel sounds, non-tender   Skin/Integumen: no rashes    Other:        atorvastatin (LIPITOR) tablet 10 mg  10 mg Oral At Bedtime     cefTRIAXone  1 g Intravenous Q24H     DULoxetine  60 mg Oral Daily     fluticasone-vilanterol  1 puff Inhalation Daily     mirtazapine  30 mg Orally disintegrating tablet At Bedtime     pantoprazole (PROTONIX) IV  40 mg Intravenous Q12H     sodium chloride (PF)  3 mL Intracatheter Q8H     IV Fluid with vitamins   Intravenous Q24H     tamsulosin (FLOMAX) capsule 0.4 mg  0.4 mg Oral Daily       Data     Recent Labs  Lab 04/29/18  0610 04/28/18  2030 04/28/18  1440 04/28/18  0410  04/27/18  0610  04/26/18  1940   WBC 5.0  --   --  5.3  --  5.3  --  6.6   HGB 7.6* 7.1* 7.6* 7.2*  < > 7.5*  < > 5.8*   MCV 85  --   --  84  --  83  --  81   *  --   --  99*  --  87*  --  151   INR 1.21*  --   --   --   --   --   --  1.52*     --   --  142  --  142  --  140   POTASSIUM 3.4  --   --  3.7  --  4.2  --  4.1   CHLORIDE 106  --   --  111*  --  111*  --  106   CO2 25  --   --  24  --  24  --  22   BUN 21  --   --  34*  --  48*  --  41*   CR 1.09  --   --  1.08  --  1.09  --  0.91   ANIONGAP 8  --   --  7  --  7  --  12   KEV 7.6*  --   --  7.2*  --  7.0*  --  7.7*   *  --   --  107*  --  109*  --  172*   ALBUMIN  --   --   --  2.6*  --   --   --  2.7*   PROTTOTAL  --   --   --  5.2*  --   --   --  5.5*   BILITOTAL  --   --   --  0.7  --   --   --  1.1   ALKPHOS  --   --   --  131  --   --   --  166*   ALT  --   --   --  36  --   --   --  48   AST  --   --   --  58*  --   --   --   68*   TROPI  --   --   --   --   --   --   --  <0.015   < > = values in this interval not displayed.    Recent Results (from the past 24 hour(s))   MR Neck w/o & w Contrast Angiogram    Narrative    MRA NECK WITHOUT AND WITH CONTRAST  4/28/2018 11:14 PM     COMPARISON: None.    HISTORY: Patient with continuous positional headache over the left  neck/occiput following head trauma. Evaluate for CSF leak, cervical  radiculopathy, vertebral dissection.    TECHNIQUE: 2D time-of-flight MR angiogram of the neck without contrast  and 3D MR angiogram of the neck with 15 mL Gadavist gadolinium IV  contrast. MIP reconstruction of all MR angiographic data was  performed. Estimates of carotid stenoses are made relative to the  distal internal carotid artery diameters except as noted.    FINDINGS:    Carotids: The common carotid arteries bilaterally are widely patent.  The cervical internal carotid arteries bilaterally are patent without  stenosis.    Vertebrals: The vertebral arteries bilaterally are patent without  stenosis and demonstrate antegrade flow.    Aortic arch: The arteries as they arise from the aortic arch are  normally arranged with no evidence for stenosis.      Impression    IMPRESSION: Normal MR angiogram of the neck.   MR Cervical Spine w/o & w Contrast    Narrative    MRI OF THE CERVICAL SPINE WITHOUT AND WITH CONTRAST 4/28/2018 11:29 PM      COMPARISON: None.    HISTORY: Patient with continuous positional headache over the left  neck/occiput following head trauma. Evaluate for CSF leak, cervical  radiculopathy, vertebral dissection.    TECHNIQUE: Multiplanar, multisequence MRI images of the cervical spine  were acquired without and with 15 mL Gadavist gadolinium IV contrast.     FINDINGS: This study is limited by patient motion.    There is normal alignment of the cervical vertebrae; however, there is  straightening of normal cervical lordosis. Vertebral body heights of  the cervical spine are normal. Marrow  signal throughout the cervical  vertebrae is within normal limits. There is no evidence for fracture  or pathologic bony lesion of the cervical spine. There is no abnormal  contrast enhancement in the cervical vertebrae.    There is loss of disc height, disc desiccation and posterior disc  bulging to varying degrees at all levels of the cervical spine with  exception of the normal appearing C7-T1 disc. There is no abnormal  contrast enhancement in the intervertebral discs of the cervical  spine.    The cervical spinal cord is mildly deformed by degenerative changes at  the C3-C4 level. The cervical spinal cord is otherwise normal in  contour. There is no abnormal signal in the cervical spinal cord.  There is no evidence for intrathecal abnormality. There is no abnormal  intrathecal contrast enhancement.    Level by level:    C2-C3: There is facet arthropathy bilaterally, uncinate hypertrophy  bilaterally and a posterior broad-based disc-osteophyte complex. These  findings result in mild-moderate left foraminal narrowing and mild  right foraminal narrowing, but no spinal canal stenosis.    C3-C4: There is facet arthropathy bilaterally, uncinate hypertrophy  bilaterally and a posterior broad-based disc-osteophyte complex with a  superimposed small posterior broad-based disc herniation (protrusion).  These findings result in moderate spinal canal stenosis,  moderate-severe left foraminal stenosis and mild right foraminal  narrowing.    C4-C5: There is facet arthropathy bilaterally, uncinate hypertrophy  bilaterally and a posterior broad-based disc-osteophyte complex. These  findings result in mild-moderate bilateral neural foraminal narrowing,  but no spinal canal stenosis.    C5-C6: There is facet arthropathy bilaterally, uncinate hypertrophy  bilaterally and a posterior broad-based disc-osteophyte complex. These  findings result in mild-moderate bilateral neural foraminal narrowing,  but no spinal canal  stenosis.    C6-C7: There is facet arthropathy bilaterally, uncinate hypertrophy  bilaterally and a posterior broad-based disc-osteophyte complex. These  findings result in mild-moderate foraminal narrowing, but no spinal  canal or left foraminal narrowing.    C7-T1: There is no spinal canal or neural foraminal stenosis at this  level.      Impression    IMPRESSION: Degenerative changes of the cervical spine as described  above.   MR Brain w/o & w Contrast    Narrative    MRI OF THE BRAIN WITHOUT AND WITH CONTRAST 4/28/2018 11:30 PM     COMPARISON: Head CT 4/21/2018.    HISTORY:  Patient with continuous positional headache over the left  neck/occiput following head trauma. Evaluate for CSF leak, cervical  radiculopathy, vertebral dissection.      TECHNIQUE: Axial diffusion-weighted with ADC map, axial T2-weighted  with fat saturation, axial T1-weighted, axial turboFLAIR and coronal  T1-weighted images of the brain were acquired without intravenous  contrast.  Following intravenous administration of gadolinium (15 mL  Gadavist), axial T1-weighted images of the brain were acquired.     FINDINGS: Again noted is a thin subdural collection along the  posterior aspect of the right cerebral convexity measuring up to 0.8  cm in thickness. This likely represents a chronic subdural fluid  collection such as a chronic subdural hematoma or chronic subdural  hygroma. There is mild diffuse cerebral volume loss. There are a few  tiny scattered focal areas of abnormal T2 signal hyperintensity in the  cerebral white matter bilaterally that are consistent with sequela of  chronic small vessel ischemic disease.    The ventricles and basal cisterns are within normal limits in  configuration given the degree of cerebral volume loss. There is no  midline shift. There is no evidence for stroke or acute intracranial  hemorrhage.      There is mild thickening and enhancement of the dura over the  posterior aspect of the right cerebral  convexity likely related to the  subdural fluid collection. There is no other abnormal contrast  enhancement in the brain or its coverings.    There is no sinusitis or mastoiditis.      Impression    IMPRESSION:   1. Stable thin subdural collection along the posterior aspect of the  right cerebral convexity measuring up to 0.8 cm in thickness.  Differential diagnosis remains chronic subdural hematoma or chronic  subdural hygroma.  2. Diffuse cerebral volume loss and cerebral white matter changes  consistent with chronic small vessel ischemic disease.

## 2022-01-31 NOTE — PROGRESS NOTES
Addended by: RIKA LONG on: 1/31/2022 02:49 PM     Modules accepted: Orders     [facility]  Gastroenterology Progress Note     Jim Richard MRN# [id number]   YOB: 1955 Age: 64 year old          Assessment and Plan:   Update: Patient had stable hemoglobin and platelets.  Abdomen soft. No stools this a.m. NO report of melena. Creatinine has increased to 1.44 from baseline of 1.20. No labs today.     Active Problems:   Alcohol withdrawal (H)  Alcohol liver cirrhosis with ascites and esophageal varices  Jim Richard is a pleasant 64 year old male with history of alcohol abuse, alcoholic liver cirrhosis with ascites and esophageal varices with diarrhea for 3 days. GI consulted for continuation of care and history of cirrhosis. Patient has had exacerbation of symptoms associated with excessive alcohol intake. Underwent ultrasound guided paracentesis and had 6750 mL of serous fluid removed. Hemoglobin stable and at baseline. Has been noncompliant with medication. Has been started on propranolol with history of esophageal varices. Has had lactulose held with diarrhea and stools incontinence and diarrhea. Having 1-2 stools a day and now more formed. Has had elevation in creatinine with addition of diuretics. Will decrease dosage.     - Recommend to consider inpatient alcohol treatment given 4 admissions in last year for alcohol related problems  - Continue with spironolactone to 50 mg and furosemide 20 mg daily and monitor daily BMP  - May restart lactulose if stools less than 3 a day.- will start with one tablespoon a day 10g/15mL  - Continue with pantoprazole and propranolol  - Continue on dicyclomine for abdominal cramping  - Repeat BMP  - IF abdominal distention worse can plan paracentesis as outpatient as well.          Alcoholic cirrhosis of liver with ascites (H)  Alcoholic intoxication with complication (H)  Suicidal ideation      Interval History:   no new complaints, doing well, denies shortness of breath, denies abdominal pain, pain is controlled,  alert, oriented to person, place and time, has had a bowel movement in the last 24 hours and doing well; no cp, sob, n/v/d, or abd pain.              Review of Systems:   C: NEGATIVE for fever, chills, change in weight  E/M: NEGATIVE for ear, mouth and throat problems  R: NEGATIVE for significant cough or SOB  CV: NEGATIVE for chest pain, palpitations or peripheral edema             Medications:   I have reviewed this patient's current medications    dicyclomine  20 mg Oral 4x Daily AC & HS     DULoxetine  30 mg Oral Daily     fluticasone-vilanterol  1 puff Inhalation Daily     folic acid  1 mg Oral Daily     furosemide  20 mg Oral Daily     lactulose  10 g Oral BID     mirtazapine  15 mg Oral At Bedtime     multivitamin w/minerals  1 tablet Oral Daily     nicotine  1 patch Transdermal Daily     nicotine   Transdermal Q8H     pantoprazole  40 mg Oral QAM AC     propranolol  10 mg Oral BID     spironolactone  50 mg Oral Daily     traZODone  200 mg Oral At Bedtime     vitamin B1  100 mg Oral Daily                  Physical Exam:   Vitals were reviewed  Vital Signs with Ranges  Temp:  [97.8  F (36.6  C)-98.6  F (37  C)] 98.3  F (36.8  C)  Heart Rate:  [48-57] 57  Resp:  [16-18] 16  BP: (109-135)/(59-73) 109/64  SpO2:  [96 %-97 %] 97 %  I/O last 3 completed shifts:  In: 1160 [P.O.:1160]  Out: -   Constitutional: healthy, alert, no distress, cooperative and pale   Cardiovascular: negative, PMI normal. No lifts, heaves, or thrills. RRR. No murmurs, clicks gallops or rub  Respiratory: negative, Percussion normal. Good diaphragmatic excursion. Lungs clear  Head: Normocephalic. No masses, lesions, tenderness or abnormalities  Neck: Neck supple. No adenopathy. Thyroid symmetric, normal size,, Carotids without bruits.  Abdomen: Abdomen soft, non-tender. BS normal. No masses, organomegaly, positive findings: distended           Data:   I reviewed the patient's new clinical lab test results.   Recent Labs   Lab Test  02/08/20  0735 02/07/20  0758 02/06/20  1413 02/06/20  0946 02/06/20  0805  02/03/20  0218   WBC 3.3* 3.4*  --   --  3.9*   < > 7.9   HGB 8.8* 8.2* 8.6*  --  8.4*   < > 9.6*   MCV 96 97  --   --  97   < > 97   PLT 67* 58*  --   --  49*   < > 153   INR  --  1.36*  --  1.29*  --   --  1.18*    < > = values in this interval not displayed.     Recent Labs   Lab Test 02/12/20  0944 02/10/20  0728 02/09/20  0810   POTASSIUM 4.5 3.7 3.7   CHLORIDE 103 102 100   CO2 23 26 26   BUN 27 25 24   ANIONGAP 7 6 7     Recent Labs   Lab Test 02/07/20  0758 02/04/20  0800 02/03/20  0218  01/11/20  1730  11/13/19  1858 11/13/19  1849  02/13/19  2340  09/25/14  0510   ALBUMIN 2.2* 2.3* 2.7*   < >  --    < > 3.0*  --    < > 3.1*   < >  --    BILITOTAL 1.0 2.5* 1.6*   < >  --    < > 1.0  --    < > 0.4   < >  --    ALT 19 22 26   < >  --    < > 24  --    < > 41   < >  --    AST 37 53* 61*   < >  --    < > 58*  --    < > 89*   < >  --    PROTEIN  --   --   --   --  Negative  --   --  30*  --   --   --  Negative   LIPASE  --   --  369  --   --   --  499*  --   --  536*   < >  --     < > = values in this interval not displayed.       I reviewed the patient's new imaging results.    All laboratory data reviewed  All imaging studies reviewed by me.    Gloria De Leon PA-C,  2/13/2020  [facility]  Office : [phone]  Cell: [phone] (Dr. Shaw)  Cell: [phone] (Gloria De Leon PA-C)

## 2022-02-16 NOTE — PROGRESS NOTES
[facility]    Hospitalist Progress Note      Assessment & Plan     Jim Richard is a 64 year old male who was admitted on 2/3/2020. His history is significant for cirrhosis (w/ ascites, esophageal varices), EtOH use disorder, depression/anxiety, COPD, sleep apnea, anemia, admitted for EtOH intoxication and SI.    He was recently hospitalized at [facility] for weakness in EtOH withdrawal, and was seen by GI, CD, and Psych. It appears the initial plan was for him to go to TCU w/ close GI f/u to adjust his diuretics, but he refused TCU and went home. Since going home he has been drinking, medication compliance sporadic, with worsening abdominal ascites. He came in for SI in the setting of drinking.      Alcohol Withdrawal: has had 4 alcohol-related admission in the past year  - CD recs: patient will need to go to TCU first, then set up outpatient rehab  - Psychiatry recs: no indication to pursue commitment at this time  - cont daily thiamine, folate, MVI  - PT/OT: rec TCU       Alcoholic Cirrhosis with Esophageal Varices  - GI recs:   - Spironolactone 50 mg PO daily   - Lasix 20 mg PO daily   - Propranolol   - Pantoprazole   - Lactulose 10 g PO BID, titrate to 2-3 BMs per day   - dicyclomine for abd cramping     Anemia with hx of Esophageal Varices  - f/u GI recs     Depression with Suicidal Ideation   Reported SI prior to hospitalization although no further SI noted after arrival  - Psychiatry assistance  - cont Duloxetine, Mirtazapine, Trazodone, prn Seroquel    FEN  - low Na diet     Dispo  - pending TCU placement followed by outpatient rehab        DVT Prophylaxis: Pneumatic Compression Devices  Code Status: DNR/DNI  Expected discharge: 2-3 days, recommended to TCU once bed is available    Efrain Aceves MD  Text Page (7am - 6pm, M-F)    Interval History   Ambulating comfortably around room this morning. Feeling well. No new symptoms.    -Data reviewed today: I reviewed all new labs and imaging  results over the last 24 hours.      Physical Exam   Temp: 98.4  F (36.9  C) Temp src: Oral BP: 104/59 Pulse: 54 Heart Rate: 51 Resp: 16 SpO2: 95 % O2 Device: None (Room air)    Vitals:    02/03/20 0800   Weight: 89.6 kg (197 lb 8.5 oz)     Vital Signs with Ranges  Temp:  [98.4  F (36.9  C)-98.6  F (37  C)] 98.4  F (36.9  C)  Pulse:  [54] 54  Heart Rate:  [44-54] 51  Resp:  [16-18] 16  BP: (104-130)/(59-72) 104/59  SpO2:  [95 %-99 %] 95 %  I/O last 3 completed shifts:  In: 480 [P.O.:480]  Out: -       General: well-appearing, NAD  HEENT: NC/AT, moist mucus membranes  Heart: RRR no m/r/g  Lungs: CTAB no crackles or wheezes  Abdomen: NABS, non-tender, +distended; no rebound or guarding; no HSM or masses.  Extremities: warm, well-perfused. Trace bilateral ankle pitting edema.  Neuro: CN II-XII grossly intact, moving extremities spontaneously    Medications       dicyclomine  20 mg Oral 4x Daily AC & HS     DULoxetine  30 mg Oral Daily     fluticasone-vilanterol  1 puff Inhalation Daily     folic acid  1 mg Oral Daily     furosemide  20 mg Oral Daily     lactulose  10 g Oral BID     mirtazapine  15 mg Oral At Bedtime     multivitamin w/minerals  1 tablet Oral Daily     nicotine  1 patch Transdermal Daily     nicotine   Transdermal Q8H     pantoprazole  40 mg Oral QAM AC     propranolol  10 mg Oral BID     spironolactone  50 mg Oral Daily     traZODone  200 mg Oral At Bedtime     vitamin B1  100 mg Oral Daily       Data   Recent Labs   Lab 02/17/20  0904 02/13/20  1224 02/12/20  0944   * 134 133   POTASSIUM 4.2 4.4 4.5   CHLORIDE 102 105 103   CO2 23 26 23   BUN 23 27 27   CR 1.38* 1.45* 1.44*   ANIONGAP 7 3 7   KEV 9.0 9.0 8.8   * 102* 108*       No results found for this or any previous visit (from the past 24 hour(s)).    109

## 2022-03-24 NOTE — PLAN OF CARE
"Discharge Planner OT   Patient plan for discharge: pt hopes to return home  Current status: OT administered the Neurobehavioral Cognitive Status Exam (COGNISTAT) to help determine pt's ability to benefit from CD treatment program and to live independently safe manner. The COGNISTAT is designed to assess intellectual functioning in five major ability areas: Language, Constructions, Memory, Calculations and Reasoning. Attention, Level of Consciousness and Orientation are assessed independently. It is important to note that, this assessment has its limitations and is not intended to replace psychological and/or neuropsychological assessments. Results can be affected by premorbid intelligence, learning disabilities, medications, sleep deprivation, attentional deficits, frontal lobe lesions or language disturbances and are reflective of the present state of the patient.  Today, pt O x 4. Assessment given once patient completed lunch meal. He was cooperative and attended throughout. Score results:  pt scored in Average range in 2 of 3 subtests under Language ability and also in Reasoning ability (Similarities and Judgement). He scored in impairment range in remaining 3 of 5 ability areas including Constructions (moderate impairment), Memory (moderate impairment), Calculations (Mild impairment).  Pt also scored in Moderate impairment range in Attention (not included in 5 major areas).  The COGNISTAT authors use the term \"dementia syndrome\" to describe disabliity in terms of cognitive functioning (not specific pathology as in Alzheimer's) and define dementia syndrome as acquired impairment in Memory and in at least one of the four other major ability areas. Results are therefore suggestive of dementia syndrome.  To be considered:   He had significant problems with repeating words and numbers. When given four words to repeat it required 9 trials to get them correct - when asked to recall those words 5min later in exam he " Patient slept thru night without interruption non labored breathing noted  "recalled 0/4, then recalled two with a category prompt and two when given choice of three. He also had difficulty repeating simple short sentences correctly (2/6 correct) and number sequences greater than 4-5 digits.  Results shared with patient and psychiatrist.. Patient stated \"I am not surprised, I have a terrible memory\". He reports that when he has attended CD treatment in the past or gone to a meeting, the minute he walks out the door he doesn't remember a thing.   Barriers to return to prior living situation: no physical barriers  Recommendations for discharge: defer to medical team  Rationale for recommendations: defer to medical team       Entered by: Nic Hackett 02/22/2020 12:45 PM      "

## 2022-04-18 NOTE — PLAN OF CARE
DATE & TIME: 2/24/2020 [id number]       Cognitive Concerns/ Orientation : A&O x4, forgetful.   BEHAVIOR & AGGRESSION TOOL COLOR: Green  CIWA SCORE: NA    ABNL VS/O2: VSS on RA except bradycardia  MOBILITY: Independent  PAIN MANAGMENT: Denies  DIET: 2g sodium, good appetite  BOWEL/BLADDER: Continent  ABNL LAB/BG: N/A - labs last drawn on 2/17  DRAIN/DEVICES: PIV SL  TELEMETRY RHYTHM: NA  SKIN: Bruising and scabs  TESTS/PROCEDURES: NA   D/C DAY/GOALS/PLACE: Pending - pt to treatment at [facility].   OTHER IMPORTANT INFO: Psych/CD following.          LV 2/24/2022  NV 4/29/2022    Refill authorized per protocol.

## 2022-04-19 NOTE — PLAN OF CARE
Discharge Planner PT   Patient plan for discharge: not stated  Current status: PT: order received; Initial evaluation completed and treatment initiated. [facility]; Reports mobility with no use of an assistive device but reports 6 recent falls; states he lives with a significant other who would be able to provide assist to him. Unsure of accuracy given confusion. On eval patient with sitter present at bedside; Patient oriented to self and place; decreased command following for mobility; poor safety awareness; mod A for supine to sit bed mobility; mod A x 2 for sit>stand; use of walker; only able to tolerate taking steps to HOB; some dizziness reported in standing and only able to stand < 10 sec x 2. Returned to supine with matthias Phipps attending patient at end of session.  Barriers to return to prior living situation: level of assist; high falls risk; impaired cognition; unable to care for self in current condition  Recommendations for discharge: TCU  Rationale for recommendations: Patient would benefit from ongoing skilled PT to address weakness, decreased activity tolerance, impaired balance, and impaired functional mobility; Would need to be min A or less for all mobility if he were to return directly home with 24/7 assist of significant other.       Entered by: Sobia Gaffney 08/06/2019 9:04 AM       normal denies any loose teeth/normal

## 2022-05-24 NOTE — ED NOTES
Patient laying on ED cart on side, eyes closed.  Respirations even and unlabored.  Patient repositions independently.  Side rails in upright position.   PMD

## 2022-10-09 NOTE — PROGRESS NOTES
9/28/2019    Writer attempted to meet with pt for CD consult. Pt was sleeping, unable to meet. Writer stuck Medicare info in his paper chart regarding CD services and Medicare. Writer was going to talk to pt about setting up an appt with  [facility] as they accept Medicare (and there are very few facilities that accept Medicare). Writer highlighted the number to call. Since he will be at TCU for an undetermined amount of time, it would be best if he follows up with [facility] when he is getting close to completing his TCU stay.   Writer will keep consult open for co-worker to follow up if needed.     Niesha Odom, SYLVIE     no

## 2022-11-10 NOTE — PROGRESS NOTES
COMFORT CARES  Cognitive Concerns/ Orientation: A&Ox4, some word-finding difficulty. Flat affect  BEHAVIOR & AGGRESSION TOOL COLOR: green   ABNL VS/O2: VSS on RA  MOBILITY: Assist x 1 with GB/walker.   PAIN MANAGMENT: Oral dilaudid q2hr; due next at 12:45am  DIET: Regualr diet for comfort  BOWEL/BLADDER: Leger patent. Abdomen distended/taut; one large loose stool  ABNL LAB/BG: No labs today.   DRAIN/DEVICES:  Leger cath for retention; R PIV SL  TELEMETRY RHYTHM: NA  SKIN: Scattered scabs and bruises. picking at all his scabs and refusing another dressing. Right abdominal paracentesis dressing changed again. Penis/linda area. Red/rash, miconazole powder applied.   TESTS/PROCEDURES: abd x-ray completed 8/17 for increase pain showed ileus vs SBO  D/C DATE:  Will  transition to hospice. SW following and looking for placement.   OTHER IMPORTANT INFO: C- Diff, Enteric isolation maintained. Surgery saw patient -no surgical recommendation. Changed to comfort cares this afternoon    Pt called in regarding a text he got from our office. I explained to Pt I was calling him earlier to get the confirmation for his appt tomorrow but his V/M box was full and it opted for a text to the patient with our information so I sent it to him. Pt said he would like to r/s appt due to it being cold and raining out tomorrow and that his  side door won't lock when it closes so he has to bungee it together to keep it shut. Pt doesn't feel comfortable driving it until it is fixed. I told Pt there was nothing available at the moment but that I will call him when something opens up. Pt states he received his recalled machine but doesn't know what his is supposed to do with it. I informed Pt there are instructions in the box. Pt states he hasn't got everything out of it yet and would like a phone call giving him instructions on what to do. Please call Pt back. I will try to get him an appt soon.     Ph. [phone]

## 2023-02-08 NOTE — PLAN OF CARE
DATE & TIME: 2/20/2020     Cognitive Concerns/ Orientation : Alert and Oriented x4   BEHAVIOR & AGGRESSION TOOL COLOR: Green   CIWA SCORE: NA    ABNL VS/O2: VSS on RA   MOBILITY: Ind   PAIN MANAGMENT: Denies   DIET: 2gm Na   BOWEL/BLADDER: Continent   ABNL LAB/BG: NA  DRAIN/DEVICES: NA  TELEMETRY RHYTHM: NA  SKIN: Bruises and skin tear   TESTS/PROCEDURES: NA  D/C DAY/GOALS/PLACE: Needs TCU placement and then Rehab placement.   OTHER IMPORTANT INFO:       Goals: 1. Practice being mindful - paying attention to the present moment on purpose and in a nonjudgmental way  2. Practice diaphragmatic breathing throughout the day  3. Practice grounding exercises - noticing what your senses are experiencing in the moment  4. When you feel overwhelmed by emotions, start by naming what you're feeling (e.g., physical sensations, emotions). Then focus on using breathing and movement to shift your nervous system to a calmer place. 5. Learn more about self-compassion at www.self-compassion.org. Watch the video on self-compassion vs self-esteem. 6. When you notice negative self-talk, work on positive self-talk by telling yourself whatever you would tell someone closest to you  7. Try guided meditation using an kannan like Head Space, Calm, or Ten Percent Happier: Meditation for the [employer]  8. Consider learning more about Vanesa Servin's work - MADELINE talks on vulnerability and shame, Netflix special called Call to Caswellbecca Contreras, books such as The Gifts of Imperfection, The Gifts of Imperfect Parenting, Daring Greatly, and Rising Strong, and podcast called Unlocking Us. 9. Consider creating a vision board to maintain focus on your goals  10. Consider listening [facility] Happ[employer] podcast  11. Make an effort to hold your urges to make things \"perfect\" in a gentle way  12.  Consider reading Scattered Minds: The Origins and Healing of Attention Deficit Disorder by Nilay AALIYAHMuhlenberg Community Hospital

## 2023-08-10 NOTE — PROGRESS NOTES
CHIEF COMPLAINT:  This is a 72-year-old female here for preventive health exam.     SUBJECTIVE:  Patient is doing well without complaints except for weight gain.  Patient has type 2 diabetes and reports blood sugars of 120-130.  Last A1c was 6.9% one year ago. She reports fasting blood sugar from 110-130.  Patient takes metformin 500 twice daily instead of 1000 twice a day due to side effects. She denies polyuria, polydipsia or polyphagia. Blood pressure is controlled on HCTZ 25 mg daily.  She takes simvastatin for hyperlipidemia.  Patient continues to smoke 1 pack of cigarettes per day and has done so for 45 years.  She is not interested in quitting at this time.  She has chronic raspiness of voice.  She has previously had vocal cord nodules removed.  The patient takes Singulair 10 mg daily for sinus problems.  Patient uses betamethasone valerate 0.1% cream for dermatitis.      Eye exam April 2022. Mammogram February 2022.  Bone DEXA February 2022.  Colonoscopy April 2022 due again April 2023.  FIT September 2018-negative.  Tdap July 2012. Pneumovax September 2013. Flu vaccine January 2022.  Prevnar June 2017. COVID 19 vaccine January, February, September 2021, June 2022. Shingrix April 2023.     ROS:  GENERAL: Patient denies fever, chills, night sweats. Patient denies weight gain. Patient denies anorexia, fatigue, weakness or swollen glands.  SKIN: Patient denies rash or hair loss.  HEENT: Patient denies sore throat, ear pain, hearing loss, nasal congestion, or runny nose. Patient denies visual disturbance, eye irritation or discharge.  LUNGS: Patient denies cough, wheeze or hemoptysis.  CARDIOVASCULAR: Patient denies chest pain, shortness of breath, palpitations, syncope or lower extremity edema.  GI: Patient denies abdominal pain, nausea, vomiting, diarrhea, constipation, blood in stool or melena.  GENITOURINARY: Patient denies pelvic pain, vaginal discharge, itch or odor. Patient denies irregular vaginal  [facility]  Gastroenterology Progress Note     Jim Richard MRN# [id number]   YOB: 1955 Age: 64 year old          Assessment and Plan:     Melena   Alcohol withdrawal- Had an episode of elevated BP and tachycardia yesterday evening. Consistent with side effects of alcohol withdrawal patient now somnolent after treatment. Hemoglobin has been stable. Lactate elevated and likely related to alcohol withdrawal.            Alcohol withdrawal, uncomplicated (H)  Anemia, unspecified type  Melena  Alcoholic cirrhosis of liver with ascites (H)      Interval History:   Patient somnolent              Review of Systems:   C: NEGATIVE for fever, chills, change in weight  E/M: NEGATIVE for ear, mouth and throat problems  R: NEGATIVE for significant cough or SOB  CV: NEGATIVE for chest pain, palpitations or peripheral edema             Medications:   I have reviewed this patient's current medications    DULoxetine  60 mg Oral Daily     fluticasone-vilanterol  1 puff Inhalation Daily     folic acid  1 mg Oral Daily     furosemide  40 mg Oral Daily     mirtazapine  15 mg Oral At Bedtime     multivitamin w/minerals  1 tablet Oral Daily     nicotine  1 patch Transdermal Daily     nicotine   Transdermal Q8H     nicotine   Transdermal Daily     pantoprazole  40 mg Oral BID AC     QUEtiapine  50 mg Oral At Bedtime     sodium chloride (PF)  3 mL Intracatheter Q8H     spironolactone  50 mg Oral Daily     tamsulosin  0.4 mg Oral Daily     vitamin B1  100 mg Oral Daily                  Physical Exam:   Vitals were reviewed  Vital Signs with Ranges  Temp:  [96.5  F (35.8  C)-99.4  F (37.4  C)] 98.3  F (36.8  C)  Pulse:  [85] 85  Heart Rate:  [] 73  Resp:  [16-18] 16  BP: (122-178)/(57-90) 130/57  SpO2:  [95 %-98 %] 98 %  I/O last 3 completed shifts:  In: -   Out: 575 [Urine:575]  Cardiovascular: negative, PMI normal. No lifts, heaves, or thrills. RRR. No murmurs, clicks gallops or rub  Respiratory:  bleeding. Patient denies dysuria, frequency, hematuria, nocturia, urgency or incontinence.  BREASTS: Patient denies breast pain, mass or nipple discharge.  MUSCULOSKELETAL: Patient denies joint pain, swelling, redness or warmth.  NEUROLOGIC: Patient denies headache, vertigo, paresthesias, weakness in limb, dysarthria, dysphagia or abnormality of gait.  PSYCHIATRIC: Patient denies anxiety, depression, or memory loss.     OBJECTIVE:   GENERAL: Well-developed well-nourished obese, black female alert and oriented x3, in no acute distress. Memory, judgment and cognition without deficit.   SKIN: Clear without rash. Normal color and tone.  Lipoma left upper arm.  HEENT: Eyes: Clear conjunctivae. No scleral icterus. Pupils equal reactive to light and accommodation. Ears: Clear TMs. Clear canals. Nose: Without congestion. Pharynx: Without injection or exudates.  NECK: Supple, normal range of motion. No masses, lymphadenopathy or enlarged thyroid. No JVD. Carotids 2+ and equal. No bruits.  LUNGS: Clear to auscultation. Normal respiratory effort.  CARDIOVASCULAR: Regular rhythm, normal S1, S2 without murmur, gallop or rub.  BACK: No CVA or spinal tenderness.  BREASTS: No masses, tenderness or nipple discharge.  ABDOMEN: Normal appearance. Active bowel sounds. Soft, nontender without mass or organomegaly. No rebound or guarding.  EXTREMITIES: Without cyanosis, clubbing or edema. Distal pulses 2+ and equal.  No joint effusion, erythema or warmth.  Negative impingement sign.  NEUROLOGIC: Cranial nerves II through XII without deficit. Motor strength equal bilaterally. Sensation normal to touch. Deep tendon reflexes 2+ and equal. Gait without abnormality. No tremor. Negative cerebellar signs.   FOOT EVALUATION: 10 gram monofilament exam with protective sensation intact bilaterally. Nails appropriately trimmed. No ulcers. Distal pulses palpable.  PELVIC: Deferred.  NORY: Deferred.    ASSESSMENT:  1. Preventative health care    2.  negative, Percussion normal. Good diaphragmatic excursion. Lungs clear  Head: Normocephalic. No masses, lesions, tenderness or abnormalities  Neck: Neck supple. No adenopathy. Thyroid symmetric, normal size,, Carotids without bruits.  Abdomen: Abdomen soft, non-tender. BS normal. No masses, organomegaly             Data:   I reviewed the patient's new clinical lab test results.   Recent Labs   Lab Test 09/26/19  0305 09/25/19  0954 09/25/19  0320 09/25/19  0025  08/06/19  0739  08/01/19  1629   WBC 3.0*  --  3.1* 3.0*   < > 5.2   < > 3.8*   HGB 9.6* 8.4* 8.7* 8.9*   < > 10.8*   < > 8.7*   MCV 96  --  96 97   < > 91   < > 95   PLT 68*  --  59* 67*   < > 191   < > 155   INR  --   --   --  1.35*  --  1.31*  --  1.18*    < > = values in this interval not displayed.     Recent Labs   Lab Test 09/26/19  0305 09/25/19  0954 09/25/19  0025 08/13/19  0744   POTASSIUM 3.5 3.0* 3.0* 3.5   CHLORIDE 101  --  101 101   CO2 27  --  28 25   BUN 16  --  17 15   ANIONGAP 6  --  7 7     Recent Labs   Lab Test 09/25/19  0025 08/12/19  0823 08/11/19  0936  02/13/19  2340  09/29/18  0550  03/21/18  1555  09/25/14  0510  06/12/12  0550   ALBUMIN 2.5* 3.1* 2.7*   < > 3.1*   < > 3.2*   < > 2.9*   < >  --    < >  --    BILITOTAL 2.4* 2.2* 2.4*   < > 0.4   < > 1.0   < > 0.9   < >  --    < >  --    ALT 34 24 24   < > 41   < > 60   < > 74*   < >  --    < >  --    * 37 36   < > 89*   < > 140*   < > 136*   < >  --    < >  --    PROTEIN  --   --   --   --   --   --   --   --   --   --  Negative  --  Negative   LIPASE  --   --   --   --  536*  --  585*  --  316  --   --   --   --     < > = values in this interval not displayed.       I reviewed the patient's new imaging results.    All laboratory data reviewed  All imaging studies reviewed by me.    Gloria De Leon PA-C,  9/27/2019  [facility]  Office : [phone]  Cell: [phone] (Dr. Shaw)  Cell: [phone] (Gloria De Leon PA-C)     Type 2 diabetes mellitus without complication, without long-term current use of insulin    3. Hypertension associated with diabetes    4. Hyperlipidemia associated with type 2 diabetes mellitus    5. Cigarette nicotine dependence without complication    6. Special screening for malignant neoplasms, colon      PLAN:  1. Weight reduction. Exercise regularly.  2. Age-appropriate counseling.  3. Fasting lab.  4. Mammogram.  5. FIT.   6. Low-dose screening CT scan of the lungs.    7. Smoking cessation discussed for 3-10 minutes.  Reviewed treatment options including Chantix, Zyban, nicotine products and counseling.  8. Considered GLP 1 agonist.    9. Discontinue simvastatin.  10. Atorvastatin 20 mg daily.    11. Follow-up in 6 months.      This note is generated with speech recognition software and is subject to transcription error and sound alike phrases that may be missed by proofreading.

## 2023-08-26 NOTE — PROGRESS NOTES
Care Suites Admission Nursing Note    Patient Information  Name: Jim Richard  Age: 65 year old  Reason for admission: Paracentesis   Care Suites arrival time: 1325    Discharge Planning    AVS reviewed and understood   Discharge name/phone number: Pt. States he has no way to get home and asks for assistance from our SW.  SHRUTHI Snell called and will look into taxi home Overnight post sedation caregiver:   Pt. Has a roommate   Discharge location: home at 1450   [employer] taxi arranged per DANIELE.     Fabrice Summers RN         
Care Suites Admission Nursing Note    Patient Information  Name: Jim Richard  Age: 65 year old  Reason for admission: paracentesis  Care Suites arrival time: 1315        Patient Admission/Assessment   Pre-procedure assessment complete: Yes  If abnormal assessment/labs, provider notified: N/A  NPO: N/A  Medications held per instructions/orders: N/A  Consent: obtained  If applicable, pregnancy test status: deferred  Patient oriented to room: Yes  Education/questions answered: Yes  Plan/other: proceed with paracentesis    Discharge Planning  Discharge name/phone number:needs [facility] home  called  Overnight post sedation caregiver: na  Discharge location: home    Sabra Brooks RN         
Care Suites Post Procedure Note    Patient Information  Name: Jim Richard  Age: 65 year old    Post Procedure  Time patient returned to Care Suites: 1425  Concerns/abnormal assessment: Para site D/I. VSS. No c/o. Drinking juice.   If abnormal assessment, provider notified: N/A  Plan/Other: Working with  for ride home.Report to Fabrice Brooks RN     
Care Suites Procedure Nursing Note    Patient Information  Name: Jim Richard  Age: 65 year old    Procedure  Procedure: US guided paracentesis  Procedure start time: 1355  Procedure complete time: 1417  Concerns/abnormal assessment: c/o SOB before procedure. States it is a little better. No pain. Drained 2400 ml clear yellow fluid from right abd.  If abnormal assessment, provider notified: N/A  Plan/Other: Pressure held. Bandaide on. Back to care suites.    Sabra Brooks RN     
PATIENT/VISITOR WELLNESS SCREENING    Step 1 Patient Screening    1. In the last month, have you been in contact with someone who was confirmed or suspected to have Coronavirus/COVID-19? No    2. Do you have the following symptoms?  Fever/Chills? No   Cough? No   Shortness of breath? No   New loss of taste or smell? No  Sore throat? No  Muscle or body aches? No  Headaches? No  Fatigue? No  Vomiting or diarrhea? No             Step 3 Refer to logic grid below for actions    NO SYMPTOM(S)    ACTIONS:  1. Standard rooming process  2. Provider to assess per normal protocol  3. Implement precautions as needed and per guidelines     POSITIVE SYMPTOM(S)  If positive for ANY of the following symptoms: fever, cough, shortness of breath, rash    ACTION:  1. Continue to have the patient wear a mask   2. Room patient as soon as possible  3. Don appropriate PPE when entering room  4. Provider evaluation    
Statement Selected

## 2023-09-08 NOTE — PROGRESS NOTES
[facility]    Medicine Progress Note - Hospitalist Service        Date of Admission:  2/3/2020  1:57 AM    Assessment & Plan:   Jim Richard is a 64 year old male who was admitted on 2/3/2020. His history is significant for cirrhosis (w/ ascites, esophageal varices), EtOH use disorder, depression/anxiety, COPD, sleep apnea, anemia, admitted for EtOH intoxication.  He was recently hospitalized at [facility] for weakness in EtOH withdrawal, and was seen by GI, CD, and Psych. It appears the initial plan was for him to go to TCU w/ close GI f/u to adjust his diuretics, but he refused TCU and went home. Since going home he has been drinking, medication compliance sporadic, with worsening abdominal ascites. He came in for SI in the setting of drinking.      Alcohol Withdrawal: has had 4 alcohol-related admission in the past year  -CD recs: patient will need to go to TCU first, then set up outpatient rehab  -Psychiatry recs: no indication to pursue commitment at this time  -cont daily thiamine, folate, MVI  -Awaiting placement at [facility].    Alcoholic Cirrhosis with Esophageal Varices  -Compensated  -Evaluated by gastroenterology  -Continue Spironolactone 50 mg PO daily, Lasix 20 mg PO daily, Propranolol, Pantoprazole, Lactulose 10 g PO BID, titrate to 2-3 BMs per day  -dicyclomine for abd cramping     Anemia with hx of Esophageal Varices  -Hemoglobin stable at this time, no plans for any invasive work-up.     Depression with Suicidal Ideation   Reported SI prior to hospitalization although no further SI noted after arrival  -Appreciate psychiatry assistance  -Continue duloxetine, Mirtazapine, Trazodone, prn Seroquel     Diet: Combination Diet 2 gm NA Diet  Snacks/Supplements Adult: Boost Plus; Between Meals     DVT Prophylaxis: Pneumatic Compression Devices   Leger Catheter: not present  Code Status: DNR/DNI     Disposition Plan    Expected discharge:Awaiting placement  "at inpatient chemotherapy.  Entered: Luis Bateman MD 02/20/2020, 1:09 PM        The patient's care was discussed with the Bedside Nurse and Patient.    Luis Bateman MD  Hospitalist Service  [facility]    ______________________________________________________________________    Interval History   Patient denies new complaints.  No chest pain or dyspnea.  No nausea or vomiting.  No hematochezia or melena.    Data reviewed today: I reviewed all medications, new labs and imaging results over the last 24 hours. I personally reviewed no images or EKG's today.    Physical Exam   Vital signs:  Temp: 98.5  F (36.9  C) Temp src: Oral BP: 126/67 Pulse: (!) 47 Heart Rate: (!) 48 Resp: 18 SpO2: 96 % O2 Device: None (Room air) Oxygen Delivery: 2 LPM Height: 170.2 cm (5' 7\") Weight: 89.6 kg (197 lb 8.5 oz)  Estimated body mass index is 30.94 kg/m  as calculated from the following:    Height as of this encounter: 1.702 m (5' 7\").    Weight as of this encounter: 89.6 kg (197 lb 8.5 oz).      Wt Readings from Last 2 Encounters:   02/03/20 89.6 kg (197 lb 8.5 oz)   01/16/20 83.5 kg (184 lb 1.4 oz)       Gen: AAOX3, NAD  Resp: CTA B/L, normal WOB  CVS: RRR, no murmur  Abd/GI: Soft, non-tender. BS- normoactive.    Skin: Warm, dry no rashes  Neuro- CN- intact. No focal deficits.       Data   Recent Labs   Lab 02/17/20  0904   *   POTASSIUM 4.2   CHLORIDE 102   CO2 23   BUN 23   CR 1.38*   ANIONGAP 7   KEV 9.0   *       No results found for this or any previous visit (from the past 24 hour(s)).  Medications       dicyclomine  20 mg Oral 4x Daily AC & HS     DULoxetine  30 mg Oral Daily     fluticasone-vilanterol  1 puff Inhalation Daily     folic acid  1 mg Oral Daily     furosemide  20 mg Oral Daily     lactulose  10 g Oral BID     mirtazapine  15 mg Oral At Bedtime     multivitamin w/minerals  1 tablet Oral Daily     nicotine  1 patch Transdermal Daily     nicotine   Transdermal Q8H     " pantoprazole  40 mg Oral QAM AC     propranolol  10 mg Oral BID     spironolactone  50 mg Oral Daily     traZODone  200 mg Oral At Bedtime     vitamin B1  100 mg Oral Daily            [place]

## 2023-11-25 NOTE — H&P
[facility]    History and Physical - Hospitalist Service       Date of Admission:  10/22/2019    Assessment & Plan     Jim Richard is a 64 year old male admitted on 10/22/2019. He presents with suicidal ideation and intentional ingestion. Patient has history of alcoholic liver disease, ulcers, esophageal varices, CAD, hypertension, hyperlipidemia, peripheral vascular disease, tobacco use, subdural hematoma, and chronic low back pain who presented to the ED with suicidal ideation.       Depression    Suicidal ideation    Anxiety and depression    Intentional Ingestion    Assessment: Presents with suicidal ideations in setting with significant alcohol abuse, and ingestion of twice the dosing of his pressure medications.  Hemodynamically stable, no active withdrawal symptoms or hallucinations.  Patient clearly needs psychiatric evaluation and possible inpatient mental health treatment.    Plan:   -Admit inpatient  -Patient is holdable if attempts to leave AMA  -Bedside sitter  -Follow vital/temp  -Regular diet, n.p.o. at midnight for paracentesis  -Fluid restriction to 1 L given significant ascites      Essential hypertension    Assessment/Plan: BPs stable on admission. Hold PTA meds due to recent unknown ingestion.       COPD (chronic obstructive pulmonary disease) (H)    JANIS on CPAP    Assessment/Plan:  Stable with no evidence of exacerbation.       PUD  Anemia of chronic Disease  History of duodenal ulcer and esophageal varices:  Assessment: Last admission was evaluated by GI on 9/25, upper GI endoscopy without evidence of bleeding   Plan:  - Continue Protonix 40 mg p.o. daily  - Etoh cessation encouraged.   - Follow up in GI clinic as directed     Alcohol Dependence   ALcohol withdrawal  Alcoholic liver disease  Ascites  Assessment: EtOH level of 0.40 on admission. No abdominal tenderness, but abdomen is markedly distended.  Plan:  - Start [facility] protocol  - telemetry, thiamine/folate/MV  ordered   - Likely would benefit from therapeutic paracentesis tomorrow a.m., this has been ordered     Tobacco abuse:  Assessment: pack a day smoker  Plan:  -21 mg patch ordered per request  -Encouraged cessation during stay     Depression and Anxiety:  Assessment/Plan: Continue PTA regimen once able to take orally     Pancytopenia:  Assessment/Plan: likely secondary to chronic alcoholism (bone marrow suppression) and gi bleed, recheck outpatient.        Diet: Regular Diet  DVT Prophylaxis: Low Risk/Ambulatory with no VTE prophylaxis indicated  Leger Catheter: not present  Code Status: DNR/DNI    Disposition Plan   Expected discharge: 2 - 3 days, recommended to prior living arrangement once psychiatry evaluation completed.  Entered: Chivo Murcia MD 10/22/2019, 6:10 PM     The patient's care was discussed with the Patient and ED Provider.    Chivo Murcia MD  [facility]    ______________________________________________________________________    Chief Complaint     Suicidal Ideation  Intentional Ingestion    History is obtained from the patient    History of Present Illness   Jim Richard is a 64 year old male with past medical history of major depression, suicidal ideations, anxiety, hypertension, COPD, obstructive sleep apnea on CPAP, peptic ulcer disease who presents for evaluation of suicidal ideations.     Patient reports that he feels his depression is not controlled whatsoever, he reports that he wants to die, and reported to RN staff to kill him in the emergency department.  He also reports that he took his antianxiety meds, blood pressure meds and drink alcohol today.  He drinks roughly 0.5 L of vodka daily.  His last drink was this morning.  He also reports that he took twice his dose of hydrochlorothiazide, spinal lactone.  He otherwise denies any hallucinations.  He denies any chest pain or shortness of breath.  He denies any fevers or chills, he reports that his abdomen is markedly  distended, and needs a paracentesis.  He denies any blood in stool, recent weight loss or night sweats.  He endorses that his lower extremity edema is baseline, not worsened.  He denies any nausea/vomiting.  He otherwise denies any other illicit drug ingestion.  He denies any current withdrawal symptoms, but he does endorse a history of alcohol withdrawal.    Review of Systems    The 10 point Review of Systems is negative other than noted in the HPI or here.     Past Medical History    I have reviewed this patient's medical history and updated it with pertinent information if needed.   Past Medical History:   Diagnosis Date     Alcoholism (H) 1/14/2013    had treatment at [facility]     Anxiety      Coronary artery disease 09/09/2014    Nuclear stress test with slight fixed defect inferiorly, no ischemia     Depression     w/anxiety     Esophageal varices with bleeding 04/28/2018    6 varices banded on EGD     Heart attack (H)      Hepatomegaly     Fatty liver, chronic alcoholic     Hyperlipemia      Hypertension      JANIS on CPAP      Peripheral vascular disease (H)      PVD (peripheral vascular disease) (H)      SDH (subdural hematoma) (H) 04/26/2018    Right side, resolved spontaneously     Spinal stenosis        Past Surgical History   I have reviewed this patient's surgical history and updated it with pertinent information if needed.  Past Surgical History:   Procedure Laterality Date     APPENDECTOMY       BYPASS GRAFT FEMOROPOPLITEAL  6/12/2012    Procedure: BYPASS GRAFT FEMOROPOPLITEAL;  LEFT FEMORAL TO ABOVE KNEE POPLITEAL BYPASS, ENDARTERECTOMY OF SFA AND PF ARTERIES, LEFT EXTERNAL ILIAC TO COMMON FEMORAL INTERPOSITION GRAFT;  Surgeon: Damir Roberts MD;  Location:  OR     COLONOSCOPY       COLONOSCOPY N/A 8/8/2019    Procedure: COLONOSCOPY;  Surgeon: Pavan Arguello MD;  Location:  GI     ENDARTERECTOMY FEMORAL  6/12/2012    Procedure: ENDARTERECTOMY FEMORAL;;  Surgeon: Damir Roberts MD;   Location:  OR     ESOPHAGOSCOPY, GASTROSCOPY, DUODENOSCOPY (EGD), COMBINED N/A 3/22/2018    Procedure: COMBINED ESOPHAGOSCOPY, GASTROSCOPY, DUODENOSCOPY (EGD);  ESOPHAGOSCOPY, GASTROSCOPY, DUODENOSOCPY.;  Surgeon: Valeri Pruitt MD;  Location:  OR     ESOPHAGOSCOPY, GASTROSCOPY, DUODENOSCOPY (EGD), COMBINED N/A 9/29/2018    Procedure: COMBINED ESOPHAGOSCOPY, GASTROSCOPY, DUODENOSCOPY (EGD);;  Surgeon: Rosendo Shaw MD;  Location:  GI     ESOPHAGOSCOPY, GASTROSCOPY, DUODENOSCOPY (EGD), COMBINED N/A 8/2/2019    Procedure: ESOPHAGOGASTRODUODENOSCOPY (EGD);  Surgeon: Pavan Arguello MD;  Location:  GI     ESOPHAGOSCOPY, GASTROSCOPY, DUODENOSCOPY (EGD), COMBINED N/A 9/25/2019    Procedure: ESOPHAGOGASTRODUODENOSCOPY (EGD);  Surgeon: Pavan Arguello MD;  Location:  GI     HERNIA REPAIR  2017    Abdominal     LAMINECTOMY, FUSION LUMBAR THREE+ LEVEL, COMBINED N/A 9/22/2014    Procedure: COMBINED LAMINECTOMY, FUSION LUMBAR THREE+ LEVEL;  Surgeon: Sebastien Pruitt MD;  Location:  OR     TONSILLECTOMY & ADENOIDECTOMY         Social History   I have reviewed this patient's social history and updated it with pertinent information if needed.  Social History     Tobacco Use     Smoking status: Current Every Day Smoker     Packs/day: 1.00     Types: Cigarettes     Smokeless tobacco: Never Used     Tobacco comment: Chantix caused nightmares   Substance Use Topics     Alcohol use: Yes     Comment: last drink thursday 9/19/19     Drug use: No       Family History   I have reviewed this patient's family history and updated it with pertinent information if needed.   Family History   Problem Relation Age of Onset     Mental Illness Son      Coronary Artery Disease Early Onset Mother      Substance Abuse Father      Lung Cancer Father      Substance Abuse Brother      Unknown/Adopted No family hx of      Depression No family hx of      Anxiety Disorder No family hx of      Schizophrenia No family hx of       Bipolar Disorder No family hx of      Suicide No family hx of      Dementia No family hx of      Anthony Disease No family hx of      Parkinsonism No family hx of      Autism Spectrum Disorder No family hx of      Intellectual Disability (Mental Retardation) No family hx of      Prior to Admission Medications   Prior to Admission Medications   Prescriptions Last Dose Informant Patient Reported? Taking?   DULoxetine (CYMBALTA) 60 MG EC capsule  Self No No   Sig: Take 1 capsule (60 mg) by mouth daily   QUEtiapine (SEROQUEL) 50 MG tablet  Self Yes No   Sig: Take 50 mg by mouth At Bedtime    albuterol (PROAIR HFA/PROVENTIL HFA/VENTOLIN HFA) 108 (90 BASE) MCG/ACT Inhaler  Self Yes No   Sig: Inhale 2 puffs into the lungs every 6 hours as needed    fluticasone-vilanterol (BREO ELLIPTA) 200-25 MCG/INH oral inhaler  Self Yes No   Sig: Inhale 1 puff into the lungs daily    folic acid (FOLVITE) 1 MG tablet  Self No No   Sig: Take 1 tablet (1 mg) by mouth daily   furosemide (LASIX) 40 MG tablet  Self No No   Sig: Take 1 tablet (40 mg) by mouth daily   lactulose (CHRONULAC) 10 GM/15ML solution   No No   Sig: Take 30 mLs (20 g) by mouth 2 times daily   mirtazapine (REMERON) 15 MG tablet  Self Yes No   Sig: Take 15 mg by mouth At Bedtime   multivitamin, therapeutic with minerals (THERA-VIT-M) TABS tablet  Self No No   Sig: Take 1 tablet by mouth daily   nicotine (NICODERM CQ) 21 MG/24HR 24 hr patch  Self No No   Sig: Place 1 patch onto the skin daily   order for DME  Self No No   Sig: Equipment being ordered: Walker Wheels () and Walker ()  Treatment Diagnosis: difficulty walking   pantoprazole (PROTONIX) 40 MG EC tablet  Self No No   Sig: Take 1 tablet (40 mg) by mouth every morning (before breakfast)   propranolol (INDERAL) 10 MG tablet  Self No No   Sig: Take 1 tablet (10 mg) by mouth 2 times daily   spironolactone (ALDACTONE) 25 MG tablet  Self No No   Sig: Take 2 tablets (50 mg) by mouth daily   tamsulosin  (FLOMAX) 0.4 MG capsule  Self Yes No   Sig: Take 0.4 mg by mouth daily   vitamin B1 (THIAMINE) 100 MG tablet  Self No No   Sig: Take 1 tablet (100 mg) by mouth daily      Facility-Administered Medications: None     Allergies   Allergies   Allergen Reactions     Amlodipine Swelling     Lisinopril      Other reaction(s): Angioedema  Mouth and tongue swelling   Mouth and tongue swelling          Physical Exam   Vital Signs: Temp: 97  F (36.1  C) Temp src: Oral BP: (!) 141/69 Pulse: 77 Heart Rate: 83 Resp: 15 SpO2: 94 % O2 Device: None (Room air)    Weight: 0 lbs 0 oz    Constitutional: awake, alert, cooperative, no apparent distress.   Eyes: Lids and lashes normal, pupils equal, round and reactive to light   ENT: Normocephalic, without obvious abnormality, atraumatic, sinuses nontender on palpation   Hematologic / Lymphatic: no cervical lymphadenopathy   Respiratory: CTABL   Cardiovascular: RRR with no m/r/g   GI: Very distended, otherwise nontender.  Musculoskeletal: There is no redness, warmth, or swelling of the joints. Full range of motion noted.   Neurologic: Awake, alert, oriented to name, place and time. Cranial nerves II-XII are grossly intact. Motor is 5 out of 5 bilaterally. Sensory is intact.   Neuropsychiatric: normal mood and affect    Data   Data reviewed today: I reviewed all medications, new labs and imaging results over the last 24 hours. I personally reviewed the EKG tracing showing see below.    EKG  Normal sinus rhythm. Left axis deviation. Right bundle branch block.    Most Recent 3 CBC's:  Recent Labs   Lab Test 10/22/19  1549 09/30/19  0700 09/29/19  0843   WBC 5.6 3.6* 3.7*   HGB 10.1* 10.2* 10.1*   MCV 98 96 97   * 105* 96*     Most Recent 3 BMP's:  Recent Labs   Lab Test 10/22/19  1549 09/30/19  0700 09/29/19  0843    136 138   POTASSIUM 3.7 3.6 3.5   CHLORIDE 107 104 105   CO2 22 28 26   BUN 18 24 23   CR 0.86 1.20 1.10   ANIONGAP 10 4 7   KEV 7.8* 8.6 8.4*   GLC 72 98 125*      Most Recent Urinalysis:  Recent Labs   Lab Test 09/25/14  0510   COLOR Yellow   APPEARANCE Clear   URINEGLC Negative   URINEBILI Negative   URINEKETONE Negative   SG 1.009   UBLD Trace*   URINEPH 6.0   PROTEIN Negative   NITRITE Negative   LEUKEST Trace*   RBCU 6*   WBCU 3*     No results found for this or any previous visit (from the past 24 hour(s)).   Strong peripheral pulses

## 2024-01-23 NOTE — ED NOTES
"PT pacing in common area, PT states \"I'm ready to go home.\" This writer informed PT that the doctor needs to see him.   " [facility] APPOINTMENT CONFIRMATION    Date: 1/29/2024    Time:  4:00 pm    Location: [facility] Emergency Department: [place] [id number] [address] [address]    Provider name: Dr. Darren Gitelman     Type: In-office visit    Zoom link sent (if applicable): N/A    Will patient be in [place] for the virtual visit? N/A    Is patient currently in a facility? No    Patient will bring new patient packet to appointment: N/A    Alternative contact information: Miriam    Appointment confirmed: Yes, by Miriam(spouse)      \"We do have free parking available in the main hospital parking lot. However, parking can be difficult to find so we ask that you arrive 40 minutes prior to your appointment.\"

## 2024-03-15 NOTE — PROGRESS NOTES
[facility]    Medicine Progress Note - Hospitalist Service       Date of Admission:  3/7/2020  Assessment & Plan   Jim Richard is a 64 year-old male with history of alcohol dependence, alcoholic cirrhosis with history of varices, depression, anxiety, coronary artery disease, obstructive sleep apnea, peripheral vascular disease, prior subdural hematoma who presents with alcohol intoxication and melena. Admitted 3/7/2020.      Cirrhosis with ascites  Alcoholic hepatitis  Hyponatremia, likely 2/2 cirrhosis/ liver failure  PTA lactulose 10 g TID, propranolol 10 mg BID, furosemide 40 mg daily, spironolactone 50 mg bid  - Continue lactulose, titrate to 2 stools/day  - Ceftriaxone IV ordered for SBP prophylaxis in setting of GIB, paracentesis did not indicate SBP.  Culture negative. 5 day course completed 3/13  - lasix 20 mg daily  - aldactone 50 mg daily 3/12, difficult with increase in dose 2/2 worsening creatinine, currently at 25 mg daily started 3/20  - s/p paracentesis 3/20 (3rd one this hospital stay), 3L removed  - Monitor LFTs and renal function per routine- stable 3/23 (alk phos elevated but this is chronic)  - monitoring hyponatremia, stable 3/23    NGUYEN  Baseline creatinine appears to be ~0.9-1.2. elevated 3/16-17 to 1.4. BP's have been ok. No obvious nephrotoxins administered, no contrast. Possible HRS type II.  - avoid nephrotoxins  - stable 1.43 on 3/23    Melena  Gastrointestinal bleed likely secondary to esophagitis  anemia  H/o Esophageal varices  Acute on chronic blood loss anemia secondary to cirrhosis and GI bleed  Presented with one week hx of melena.  Baseline hemoglobin 9-10 g/dl recent, on admission Hgb 9.4 g/dl.  Mildly tachycardic on admission otherwise blood pressure stable. Suspected slow upper GI bleed.  - GI ([facility]) consulted, EGD 3/7 did not show any bleeding ulcers, noted to have grade 1 esophageal varices with no signs of bleeding. Colonoscopy 3/10 with  "diverticulosis, 2 polyps, congested mucosa  - Continue pantoprazole BID as per GI   - Avoid anticoagulation  - transfuse <7, pt has been consented  - hgb stable 3/23 at 9.5, continue to monitor    Depression with hx of suicidal ideation  Anxiety  PTA duloxetine 60 mg daily, quetiapine 50 mg at bedtime PRN, trazodone 200 mg at bedtime, mirtazapine 15 mg at bedtime.   Poor medication compliance, had not taken meds for several weeks PTA. Psych had seen previous hospitalization. He denies ongoing SI in the ED.   - Psychiatry has seen, greatly appreciate assistance  - continue duloxetine, mirtazapine, prn trazodone  - prn atarax for anxiety  - undergoing commitment proceedings with the [facility], s/p preliminary hearing 3/18. Next court hearing by phone on 3/23     Alcohol intoxication  Alcohol use disorder  Drinking up to 1-1.75 L vodka daily. BARNEY on admission at 0.29. Does have hx of DT's, but no h/o withdrawal seizures.   - IV vitamins given  - withdrawal completed  - Psychiatry consulted as above  - Chemical dependency has seen. Most recent evaluation 2/13, still valid for 30 days. Possibility of [facility] program for treatment  - undergoing court commitment proceedings     JANIS on CPAP  Uses at home, declines here. Will order at discharge     COPD  Tobacco abuse  Stable  - Continue prior to admission inhalers  - declining Breo Ellipta, will change to prn. Pt states he might want at discharge \"when he might start smoking again\"    Thrombocytopenia secondary to cirrhosis  Improving last check 3/13  - platelets normalized 3/20    Diet: 2 Gram Sodium Diet  Snacks/Supplements Adult: Other; 10 am and 2pm: straw Boost  (RD); Between Meals    DVT Prophylaxis: Pneumatic Compression Devices  Leger Catheter: not present  Code Status: DNR/DNI      Disposition Plan   Expected discharge: Will remain under court commitment.  Court hearing is today 3/23.  Appreciate social work assistance.  Entered: Mark Gomez MD 03/23/2020, " 8:07 AM     The patient's care was discussed with the Bedside Nurse and Patient.    ______________________________________________________________________    Interval History   Overnight events reviewed. Denies cp/sob. States abdominal distension is stable.    Data reviewed today: I reviewed  All medications, new labs and imaging results over the last 24 hours. I personally reviewed no images or EKG's today.    Physical Exam   Vital Signs: Temp: 97.8  F (36.6  C) Temp src: Oral BP: 129/61 Pulse: 57 Heart Rate: 59 Resp: 16 SpO2: 97 % O2 Device: None (Room air)    Weight: 177 lbs 0 oz    Constitutional: Awake, alert, cooperative, no apparent distress  Respiratory: CTA B  Cardiovascular: Regular rate and rhythm, MICKY noted  GI: Normal bowel sounds,  distended and firm, nontender  Skin/Integumen: No rashes, no edema noted  Neuro : moving all 4 extremities, no focal deficit noted       Data   Recent Labs   Lab 03/23/20  0628 03/21/20  0717 03/20/20  0651   WBC 6.0  --  4.6   HGB 9.5*  --  9.4*   MCV 91  --  92     --  204   * 131* 131*   POTASSIUM 4.0 4.2 4.2   CHLORIDE 99 101 101   CO2 26 24 26   BUN 30 25 27   CR 1.43* 1.42* 1.40*   ANIONGAP 6 6 4   KEV 8.6 8.8 8.7   GLC 90 83 85   ALBUMIN 2.7*  --   --    PROTTOTAL 7.0  --   --    BILITOTAL 0.8  --   --    ALKPHOS 253*  --   --    ALT 48  --   --    AST 38  --   --        No results found for this or any previous visit (from the past 24 hour(s)).  Medications     - MEDICATION INSTRUCTIONS -       - MEDICATION INSTRUCTIONS -       - MEDICATION INSTRUCTIONS -       - MEDICATION INSTRUCTIONS -         albumin human  12.5 g Intravenous Once     DULoxetine  60 mg Oral Daily     fluticasone-vilanterol  1 puff Inhalation Daily     furosemide  20 mg Oral Daily     lactulose  10 g Oral BID     mirtazapine  15 mg Oral At Bedtime     nicotine  1 patch Transdermal Daily     nicotine   Transdermal Q8H     pantoprazole  40 mg Oral BID AC     polyethylene glycol  238 g  Oral Once     sodium chloride (PF)  3 mL Intracatheter Q8H     spironolactone  25 mg Oral Daily        9

## 2024-10-04 NOTE — ED NOTES
"Pt hit call light and is requesting to see a MD. Pt states \"I do not belong here\". \"When is the doc going to see me\"? Pt states he would like to get dressed and leave as he does not belong here. ED staff care team was made aware. Pt was also given water via RN per pt's request; RN is @ pt's side.  "
Bed: ED16  Expected date: 12/24/18  Expected time: 1:30 AM  Means of arrival: Ambulance  Comments:  HEMS 414 63M intoxicated  
Video Observation initiated, patient informed.     Shea Loredo RN    
Video Observation initiated, patient informed.  States understanding but understanding limited due to level of alcohol intoxication.   Michaela Rogers RN      
No

## 2025-01-12 NOTE — PROGRESS NOTES
BSSR complete Anitha is awake oriented to self at this time call light in reach bed alarm on for safety    [facility]    Hospitalist Progress Note      Assessment & Plan     Jim Richard is a 64 year old male who was admitted on 2/3/2020. His history is significant for cirrhosis (w/ ascites, esophageal varices), EtOH use disorder, depression/anxiety, COPD, sleep apnea, anemia, admitted for EtOH intoxication and SI.    He was recently hospitalized at [facility] for weakness in EtOH withdrawal, and was seen by GI, CD, and Psych. It appears the initial plan was for him to go to TCU w/ close GI f/u to adjust his diuretics, but he refused TCU and went home. Since going home he has been drinking, medication compliance sporadic, with worsening abdominal ascites. He came in for SI in the setting of drinking.      Alcohol Withdrawal: has had 4 alcohol-related admission in the past year  - CD recs: patient will need to go to TCU first, then set up outpatient rehab  - Psychiatry recs: no indication to pursue commitment at this time  - cont daily thiamine, folate, MVI  - PT/OT: rec TCU       Alcoholic Cirrhosis with Esophageal Varices  - GI recs:   - Spironolactone 50mg PO daily   - Lasix 20mg PO daily   - Propranolol 10mg PO BID   - Pantoprazole   - Lactulose 10g PO BID, titrate to 2-3 BMs per day   - dicyclomine for abd cramping     Anemia with hx of Esophageal Varices  - f/u GI recs     Depression with Suicidal Ideation   Reported SI prior to hospitalization although no further SI noted after arrival  - Psychiatry assistance  - cont Duloxetine, Mirtazapine, Trazodone, prn Seroquel    FEN  - low Na diet     Dispo  - pending TCU placement followed by outpatient rehab        DVT Prophylaxis: Pneumatic Compression Devices  Code Status: DNR/DNI  Expected discharge: 2-3 days, recommended to TCU once bed is available    Efrain Aceves MD  Text Page (7am - 6pm, M-F)    Interval History   Sitting comfortably on chair this morning w/o new symptoms. No new complaints.    -Data reviewed today: I reviewed all new labs and  imaging results over the last 24 hours.      Physical Exam   Temp: 97.3  F (36.3  C) Temp src: Axillary BP: 116/69 Pulse: 55 Heart Rate: 57 Resp: 18 SpO2: 97 % O2 Device: None (Room air)    Vitals:    02/03/20 0800   Weight: 89.6 kg (197 lb 8.5 oz)     Vital Signs with Ranges  Temp:  [97.3  F (36.3  C)-98.2  F (36.8  C)] 97.3  F (36.3  C)  Pulse:  [55] 55  Heart Rate:  [48-57] 57  Resp:  [16-18] 18  BP: (109-149)/(52-72) 116/69  SpO2:  [96 %-97 %] 97 %  I/O last 3 completed shifts:  In: 760 [P.O.:760]  Out: -       General: well-appearing, NAD  HEENT: NC/AT, moist mucus membranes  Heart: RRR no m/r/g  Lungs: CTAB no crackles or wheezes  Abdomen: NABS, non-tender, mildly distended; no rebound or guarding; no HSM or masses.  Extremities: warm, well-perfused. 1-2+ bilateral ankle pitting edema.  Neuro: CN II-XII grossly intact, moving extremities spontaneously    Medications       dicyclomine  20 mg Oral 4x Daily AC & HS     DULoxetine  30 mg Oral Daily     fluticasone-vilanterol  1 puff Inhalation Daily     folic acid  1 mg Oral Daily     furosemide  20 mg Oral Daily     lactulose  10 g Oral BID     mirtazapine  15 mg Oral At Bedtime     multivitamin w/minerals  1 tablet Oral Daily     nicotine  1 patch Transdermal Daily     nicotine   Transdermal Q8H     pantoprazole  40 mg Oral QAM AC     propranolol  10 mg Oral BID     spironolactone  50 mg Oral Daily     traZODone  200 mg Oral At Bedtime     vitamin B1  100 mg Oral Daily       Data   Recent Labs   Lab 02/13/20  1224 02/12/20  0944 02/10/20  0728  02/08/20  0735   WBC  --   --   --   --  3.3*   HGB  --   --   --   --  8.8*   MCV  --   --   --   --  96   PLT  --   --   --   --  67*    133 134   < > 136   POTASSIUM 4.4 4.5 3.7   < > 3.8   CHLORIDE 105 103 102   < > 105   CO2 26 23 26   < > 29   BUN 27 27 25   < > 24   CR 1.45* 1.44* 1.38*   < > 1.26*   ANIONGAP 3 7 6   < > 2*   KEV 9.0 8.8 8.6   < > 8.4*   * 108* 99   < > 95    < > = values in this  interval not displayed.       No results found for this or any previous visit (from the past 24 hour(s)).

## 2025-01-27 NOTE — PLAN OF CARE
A&Ox4. VSS on RA. CIWAs 2, 4, 2. PRN atarax x1 for anxiety . L tibial scab CDI. Coccyx pink and AGUILA, ambulation encouraged. SBA in halls, ind in room. On 72 hour hold,m ends 3/16. Court hold started 3/13 at 1610. PRN Tylenol given x1 for neck pain. Would only take 15ml of the 30ml Lactulose dose.    Detail Level: Detailed Add 22814 Cpt? (Important Note: In 2017 The Use Of 79340 Is Being Tracked By Cms To Determine Future Global Period Reimbursement For Global Periods): no

## 2025-02-21 NOTE — PLAN OF CARE
Pt has been up ad eliane throughout the evening. Presents a tense and anxious affect; mood is calm. Well groomed. Attends groups and participates accordingly. Respectful to peers and staff. Pt expressed he is frustrated with his situation but is happy he is getting help. Pt brightens easily upon approach and communication.    Oriented - self; Oriented - place; Oriented - time

## 2025-03-15 NOTE — ED NOTES
"Pt becoming increasingly agitated, raising voice to staff about sleeping medications and wanting to go home, \"give me my clothes and wallet, I'm leaving\". Explained again he is on a hold and must stay. Pt angry and called staff \"pricks\" and insists he is sober, explained to pt he is not sober and d/t suicidal statements must be kept here for his own safety.  "
"Pt reports being more depressed around the holidays, states \"my family is gone\", and reports he doesn't see his kids often. Hx depression, taking medications. Pt drank a pint of vodka today then texted family members goodbye, told his roommate he was going to his closet to kill himself. States to ED staff \"I'm ready to go goodbye, Just do it!\" Has no plan of how he would hurt himself.  "
"Pt up and down from bed, pacing hallway. Pt request for medication for anxiety and COPD. Respiratory status stable. Pt yelling \"This doctor is a piece of shit\". MD notified.  "
"Pt upset he cannot get bedtime medication for sleep. It was explained to him that the doctor will not be giving him any medication for sleep, and that these medications are not to be mixed with alcohol. Pt wanted to know if he could go home, explained to him again that he will be here for at least a few more hours, that he must be more sober before he can be assessed by DEC, and before it is safe for him to go home. Pt states \"that's crazy!\". Then stood in hallway with arms crossed glaring at staff through windows.   "
Bed: [facility]  Expected date:   Expected time:   Means of arrival:   Comments:  445 male 63y suicidal  
MD in to assess pt.  
Md discussing medication and POC with pt.  
Pt continues to request medication for sleep. Told once again this will not happen. Updated again on plan to sober up before being assessed. Pt gave writer middle finger.  
Pt request for medication to go to sleep. MD notified, denied request. Pt updated on this, Pt wants to go home, told he needs to be more sober before he can go home.  
Pt. Asking for aspirin to put on his sore tooth.  
Pt. At the desk asking when he gets to see the Dr. And when he gets to go home.  Pt. Reminded that he needs to be evaluated by DEC via tele-dec before disposition is determined. He's also asking for something for his anxiety and informed that with his blood alcohol elevated these medications are not to be mixed with alcohol.   
Pt. Talking with DEC via tele-dec.  
Video Observation initiated, patient informed.     Charo Rice RN      
losing balance

## (undated) RX ORDER — LIDOCAINE HYDROCHLORIDE 10 MG/ML
INJECTION, SOLUTION INFILTRATION; PERINEURAL
Status: DISPENSED
Start: 2021-08-16

## (undated) RX ORDER — LIDOCAINE HYDROCHLORIDE 10 MG/ML
INJECTION, SOLUTION INFILTRATION; PERINEURAL
Status: DISPENSED
Start: 2021-08-21

## (undated) RX ORDER — ALBUMIN (HUMAN) 12.5 G/50ML
SOLUTION INTRAVENOUS
Status: DISPENSED
Start: 2021-05-28

## (undated) RX ORDER — ALBUMIN (HUMAN) 12.5 G/50ML
SOLUTION INTRAVENOUS
Status: DISPENSED
Start: 2021-04-26

## (undated) RX ORDER — PROPOFOL 10 MG/ML
INJECTION, EMULSION INTRAVENOUS
Status: DISPENSED
Start: 2018-03-22

## (undated) RX ORDER — DEXAMETHASONE SODIUM PHOSPHATE 4 MG/ML
INJECTION, SOLUTION INTRA-ARTICULAR; INTRALESIONAL; INTRAMUSCULAR; INTRAVENOUS; SOFT TISSUE
Status: DISPENSED
Start: 2018-03-22

## (undated) RX ORDER — FENTANYL CITRATE 50 UG/ML
INJECTION, SOLUTION INTRAMUSCULAR; INTRAVENOUS
Status: DISPENSED
Start: 2020-03-10

## (undated) RX ORDER — LIDOCAINE HYDROCHLORIDE 10 MG/ML
INJECTION, SOLUTION EPIDURAL; INFILTRATION; INTRACAUDAL; PERINEURAL
Status: DISPENSED
Start: 2018-05-01

## (undated) RX ORDER — FENTANYL CITRATE 50 UG/ML
INJECTION, SOLUTION INTRAMUSCULAR; INTRAVENOUS
Status: DISPENSED
Start: 2020-03-08

## (undated) RX ORDER — FENTANYL CITRATE 50 UG/ML
INJECTION, SOLUTION INTRAMUSCULAR; INTRAVENOUS
Status: DISPENSED
Start: 2019-08-08

## (undated) RX ORDER — FENTANYL CITRATE 50 UG/ML
INJECTION, SOLUTION INTRAMUSCULAR; INTRAVENOUS
Status: DISPENSED
Start: 2020-06-11

## (undated) RX ORDER — FENTANYL CITRATE 50 UG/ML
INJECTION, SOLUTION INTRAMUSCULAR; INTRAVENOUS
Status: DISPENSED
Start: 2019-08-02

## (undated) RX ORDER — FENTANYL CITRATE 50 UG/ML
INJECTION, SOLUTION INTRAMUSCULAR; INTRAVENOUS
Status: DISPENSED
Start: 2019-09-25

## (undated) RX ORDER — FENTANYL CITRATE 50 UG/ML
INJECTION, SOLUTION INTRAMUSCULAR; INTRAVENOUS
Status: DISPENSED
Start: 2018-03-22

## (undated) RX ORDER — FENTANYL CITRATE 50 UG/ML
INJECTION, SOLUTION INTRAMUSCULAR; INTRAVENOUS
Status: DISPENSED
Start: 2018-09-29

## (undated) RX ORDER — FENTANYL CITRATE 50 UG/ML
INJECTION, SOLUTION INTRAMUSCULAR; INTRAVENOUS
Status: DISPENSED
Start: 2021-01-23

## (undated) RX ORDER — ONDANSETRON 2 MG/ML
INJECTION INTRAMUSCULAR; INTRAVENOUS
Status: DISPENSED
Start: 2018-03-22

## (undated) RX ORDER — FENTANYL CITRATE 50 UG/ML
INJECTION, SOLUTION INTRAMUSCULAR; INTRAVENOUS
Status: DISPENSED
Start: 2021-08-16

## (undated) RX ORDER — ALBUMIN (HUMAN) 12.5 G/50ML
SOLUTION INTRAVENOUS
Status: DISPENSED
Start: 2021-07-26

## (undated) RX ORDER — ALBUMIN (HUMAN) 12.5 G/50ML
SOLUTION INTRAVENOUS
Status: DISPENSED
Start: 2020-12-28